# Patient Record
Sex: FEMALE | Race: WHITE | NOT HISPANIC OR LATINO | Employment: UNEMPLOYED | URBAN - METROPOLITAN AREA
[De-identification: names, ages, dates, MRNs, and addresses within clinical notes are randomized per-mention and may not be internally consistent; named-entity substitution may affect disease eponyms.]

---

## 2017-01-05 ENCOUNTER — OFFICE VISIT (OUTPATIENT)
Dept: PHYSICAL MEDICINE AND REHAB | Facility: CLINIC | Age: 22
End: 2017-01-05

## 2017-01-05 VITALS
HEART RATE: 77 BPM | SYSTOLIC BLOOD PRESSURE: 112 MMHG | WEIGHT: 151 LBS | BODY MASS INDEX: 26.75 KG/M2 | DIASTOLIC BLOOD PRESSURE: 64 MMHG | HEIGHT: 63 IN

## 2017-01-05 DIAGNOSIS — G24.3 SPASMODIC TORTICOLLIS: Primary | ICD-10-CM

## 2017-01-05 DIAGNOSIS — G24.1 DYSTONIA, TORSION, FRAGMENTS OF: ICD-10-CM

## 2017-01-05 ASSESSMENT — PAIN SCALES - GENERAL: PAINLEVEL: SEVERE PAIN (6)

## 2017-01-05 NOTE — PROGRESS NOTES
"BOTULINUM TOXIN PROCEDURE NOTE    Chief Complaint   Patient presents with     Dystonia     RBO       /64 mmHg  Pulse 77  Ht 1.6 m (5' 3\")  Wt 68.493 kg (151 lb)  BMI 26.76 kg/m2      Current outpatient prescriptions:      MethylPREDNISolone (MEDROL PO), Take 4 mg by mouth, Disp: , Rfl:      norgestimate-ethinyl estradiol (ORTHO-CYCLEN, SPRINTEC) 0.25-35 MG-MCG per tablet, Take 1 tablet by mouth daily, Disp: 84 tablet, Rfl: 3     sulfamethoxazole-trimethoprim (BACTRIM DS/SEPTRA DS) 800-160 MG per tablet, Take 1 tablet by mouth 2 times daily, Disp: 20 tablet, Rfl: 0     SUMAtriptan (IMITREX) 100 MG tablet, Take 1 tablet (100 mg) by mouth at onset of headache for migraine May repeat in 2 hours. Max 2 tablets/24 hours., Disp: 18 tablet, Rfl: 3     promethazine (PHENERGAN) 25 MG tablet, Take 0.5-1 tablets (12.5-25 mg) by mouth every 6 hours as needed for nausea, Disp: 20 tablet, Rfl: 1     sucralfate (CARAFATE) 1 GM/10ML suspension, Take 10 mLs (1 g) by mouth 4 times daily, Disp: 1200 mL, Rfl: 2     metoclopramide (REGLAN) 5 MG tablet, Take 1 tablet (5 mg) by mouth 2 times daily as needed, Disp: 60 tablet, Rfl: 2     meclizine (ANTIVERT) 25 MG tablet, Take 1 tablet (25 mg) by mouth every 6 hours as needed for dizziness, Disp: 30 tablet, Rfl: 1     LORazepam (ATIVAN) 1 MG tablet, Take 1-2 tablets (1-2 mg) by mouth every 8 hours as needed for other (abdominal pain) Do not operate a vehicle after taking this medication, Disp: 75 tablet, Rfl: 0     apremilast (OTEZLA) 30 MG tablet, Take 1 tablet (30 mg) by mouth 2 times daily, Disp: 60 tablet, Rfl: 3     Apremilast (OTEZLA) 10 & 20 & 30 MG TBPK, Take one tablet in the morning on day 1, then take one tablet every 12 hours on days 2 thru 14, according to the instructions on the packet., Disp: 27 tablet, Rfl: 0     Colchicine 0.6 MG CAPS, Take 0.6 mg by mouth See Admin Instructions 2 capsules in AM and 1 capsule in PM, Disp: 90 capsule, Rfl: 3     Magic Mouthwash (FV " std formula) lidocaine visc 2% 2.5mL/5mL & maalox/mylanta w/ simeth 2.5mL/5mL & diphenhydrAMINE 5mg/5mL, Swish and swallow 10 mLs in mouth every 6 hours as needed for mouth sores, Disp: 1 Bottle, Rfl: 1     clobetasol (TEMOVATE) 0.05 % cream, Apply topically 2 times daily, Disp: 60 g, Rfl: 0     OnabotulinumtoxinA (BOTOX IJ), Inject 150 Units into the muscle once Lot: /C3 Exp: 05/2019, Disp: , Rfl:      botulinum toxin type A (BOTOX) 100 UNITS injection, Inject 200 Units into the muscle every 3 months, Disp: 200 Units, Rfl: 3     ondansetron (ZOFRAN-ODT) 4 MG disintegrating tablet, Take 1 tablet (4 mg) by mouth 3 times daily as needed for nausea, Disp: 120 tablet, Rfl: 3     lidocaine (LIDODERM) 5 % patch, Apply up to 3 patches to painful area at once for up to 12 h within a 24 h period.  Remove after 12 hours., Disp: 30 patch, Rfl: 1     guanFACINE (TENEX) 1 MG tablet, 3 tablets in the morning and 3 tablets in the evening, Disp: 180 tablet, Rfl: 3     omeprazole (PRILOSEC) 40 MG capsule, Take 1 capsule (40 mg) by mouth daily, Disp: 30 capsule, Rfl: 9     linaclotide (LINZESS) 290 MCG capsule, Take 1 capsule (290 mcg) by mouth every morning (before breakfast), Disp: 30 capsule, Rfl: 1     UNABLE TO FIND, daily MEDICATION NAME: Peppermint supplement, Disp: , Rfl:      hyoscyamine (ANASPAZ,LEVSIN) 0.125 MG tablet, Take 1-2 tablets (125-250 mcg) by mouth every 4 hours as needed for cramping, Disp: 40 tablet, Rfl: 1     Aspirin-Acetaminophen-Caffeine (EXCEDRIN MIGRAINE PO), Take by mouth as needed , Disp: , Rfl:      hypromellose (GENTEAL) 0.3 % SOLN, 1 drop every hour as needed, Disp: , Rfl:      betamethasone valerate (VALISONE) 0.1 % cream, Apply topically 2 times daily, Disp: , Rfl:      carbamide peroxide (DEBROX) 6.5 % otic solution, 5 drops 2 times daily, Disp: , Rfl:      psyllium (METAMUCIL SMOOTH TEXTURE) 63 % POWD, Take 3 teaspoonful by mouth 3 times daily Mix in 8 ounces of water, Disp: 1 Bottle, Rfl:  11     polyethylene glycol (MIRALAX/GLYCOLAX) powder, Take 17 g by mouth 2 times daily, Disp: , Rfl:      Lactase (LACTAID PO), Take by mouth daily, Disp: , Rfl:      multivitamin, therapeutic with minerals (MULTI-VITAMIN) TABS, Take 1 tablet by mouth daily, Disp: , Rfl:      Pyridoxine HCl (VITAMIN B6 PO), , Disp: , Rfl:      dicyclomine (BENTYL) 20 MG tablet, Take 1 tablet (20 mg) by mouth every 6 hours, Disp: 90 tablet, Rfl: 1     lidocaine (XYLOCAINE) 2 % jelly, Apply 1 Tube topically daily, Disp: , Rfl:      triamcinolone (KENALOG) 0.1 % cream, Apply sparingly to oral ulcers three times daily for 14 days as needed., Disp: 15 g, Rfl: 1     EPINEPHrine (EPIPEN) 0.3 MG/0.3ML injection, Inject 0.3 mLs into the muscle once as needed for anaphylaxis for 1 dose. And call 911., Disp: 2 each, Rfl: 1     Ranitidine HCl (RANITIDINE 75 PO), Take  by mouth. As needed for GI upset, Disp: , Rfl:      Allergies   Allergen Reactions     Amoxil [Penicillins] Rash     Dad unsure of reaction.     Contrast Dye Rash     Contrast Media Ready-Box [place], 04/09/2014.; Contrast Media Ready-Box [place], 04/09/2014.  NOTE: this is a contrast media oral with iodine. Premedicate with methylpred standard for IV contrast, request barium contrast for oral contrast.     Kiwi Swelling     Orange Fruit [Citrus] Anaphylaxis     Pineapple Anaphylaxis, Difficulty breathing and Rash     Milk Protein Extract Hives     Reglan [Metoclopramide] Other (See Comments)     IV dose only, in ER, rapid heart rate.     Ace Inhibitors      Difficulty in breathing and GI upset     Amoxicillin-Pot Clavulanate      Latex      Midazolam Unknown     parent states that when pt takes this medication, she wakes up being very violent .     Versed      Coming out of pelvic exam at age of 5, was kicking and screaming when coming out of the versed.     Adhesive Tape Rash     Azithromycin Hives and Rash     Cephalexin Itching and Rash     Itchy mouth     Keflex [Cephalexin-Fd&C  "Yellow #6] Hives        PHYSICAL EXAM:    Shoulders rolled forward.    Excessive tone at left shoulder.  C/O excessive pain at left neck and shoulder today.  She  Complains of more migraine headaches over the last couple of weeks.  Also has lower grade headaches throughout the day.  She often applies pressure to trigger points in her shoulders to try to alleviate her headaches.    States she did have improvement with muscle pain, tics and jerking movements after initial dose of Botox.      We reviewed the recommended safety guidelines for  Botox from any vaccine injection, such as the seasonal flu vaccine, by a minimum of 10-14 days with Samara Oropeza. She acknowledged understanding.    HPI:    Patient reports the following new medical problems since last visit: sinus infection resolved.  Pt reports episodes of \"blacking out\" and has been hospitalized.  States she didn't eat for about a month.  Is being followed by Primary Care, Cardiology and Rheumatology.    We reviewed the recommended safety guidelines for  Botox from any vaccine injection, such as the seasonal flu vaccine, by a minimum of 10-14 days with Samara Oropeza. She acknowledged understanding.    RESPONSE TO PREVIOUS TREATMENT:    Samara Oropeza received 150 units of Botox on 10/10/2016.    Problems following the previous series of neurotoxin injections included:  Muscle weakness:  Rated as 'Moderate' severity.  Duration: few days then resolved.  Headache.  Slight bruising at stick site at left shoulder.    BENEFITS BY PATIENT REPORT:    Pain and dystonia improvement reported as 30% for about 4 weeks.    BOTULINUM NEUROTOXIN INJECTION PROCEDURES:    VERIFICATION OF PATIENT IDENTIFICATION AND PROCEDURE     Initials   Patient Name lmd   Patient  lmd   Procedure Verified by: emmy     Prior to the start of the procedure and with procedural staff participation, I verbally confirmed the patient s identity using two " indicators, relevant allergies, that the procedure was appropriate and matched the consent or emergent situation, and that the correct equipment/implants were available. Immediately prior to starting the procedure I conducted the Time Out with the procedural staff and re-confirmed the patient s name, procedure, and site/side. 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     Her baseline symptoms have been recalcitrant to oral medications and conservative therapy.  She is here today for an injection of Botox.      GOAL OF PROCEDURE:  The goal of this procedure is to decrease pain  associated with dystonic movements.    TOTAL DOSE ADMINISTERED:  Dose Administered:  150 units Botox    Diluent Used:  0.25% Sensorcaine  Total Volume of Diluent Used:  1.50 ml  Lot # /C3 with Expiration Date:  8/2019  NDC #: Botox 100u ([address]-53)    Medication guide was offered to patient and was declined.    CONSENT:  The risks, benefits, and treatment options were discussed with Samara Oropeza and she agreed to proceed.      Written consent was obtained by lmd.     EQUIPMENT USED:  Needle-37mm stimulating/recording  EMG/NCS Machine    SKIN PREPARATION:  Skin preparation was performed using an alcohol wipe.    GUIDANCE DESCRIPTION:  Electro-myographic guidance was necessary throughout the administration of Botox to neck and shoulder muscles to accurately identify all areas of dystonic muscles while avoiding injection of non-dystonic muscles, neighboring nerves and nearby vascular structures.      AREA/MUSCLE INJECTED:  150 units of Botox dil 2:1 NS and 0.25% Sensorcaine  Right splenius - 5 units of Botox at 1 site  Left splenius - 5 units of Botox at 1 site    Right lateral trap - 10 units of Botox at 1 site  Left lateral trap - 10 units of Botox at 1 site    Right levator - 10 units of Botox at 1 site  Left levator - 10 units of Botox at 1 site    Right and left frontalis 30 units of Botox at 6 sites    Right and left temporalis - 70 units at 10 sites    RESPONSE TO PROCEDURE:  Samara Oropeza tolerated the procedure well and there were no immediate complications.  She was allowed to recover for an appropriate period of time and was discharged home in stable condition.    FOLLOW UP:  Samara Oropeza was asked to follow up by phone in 7-14 days with Marcelle Richardson PT, Care Coordinator or Vidya Dickinson RN, Care Coordinator, to report her response to this series of injections.  Based on the patient's previous response to this therapy, Samara Oropeza was rescheduled for the next series of injections in 12 weeks.    PLAN (Medication Changes, Therapy Orders, Work or Disability Issues, etc.): Will monitor response to today's injections and report.    There were 50 units of Botox as unavoidable waste for this patient.

## 2017-01-05 NOTE — Clinical Note
"1/5/2017       RE: Samara Oropeza  [address]  [place] [address]     Dear Colleague,    Thank you for referring your patient, Samara Oropeza, to the [facility] PHYSICAL MEDICINE AND REHABILITATION at [facility]. Please see a copy of my visit note below.    BOTULINUM TOXIN PROCEDURE NOTE    Chief Complaint   Patient presents with     Dystonia     RBO       /64 mmHg  Pulse 77  Ht 1.6 m (5' 3\")  Wt 68.493 kg (151 lb)  BMI 26.76 kg/m2      Current outpatient prescriptions:      MethylPREDNISolone (MEDROL PO), Take 4 mg by mouth, Disp: , Rfl:      norgestimate-ethinyl estradiol (ORTHO-CYCLEN, SPRINTEC) 0.25-35 MG-MCG per tablet, Take 1 tablet by mouth daily, Disp: 84 tablet, Rfl: 3     sulfamethoxazole-trimethoprim (BACTRIM DS/SEPTRA DS) 800-160 MG per tablet, Take 1 tablet by mouth 2 times daily, Disp: 20 tablet, Rfl: 0     SUMAtriptan (IMITREX) 100 MG tablet, Take 1 tablet (100 mg) by mouth at onset of headache for migraine May repeat in 2 hours. Max 2 tablets/24 hours., Disp: 18 tablet, Rfl: 3     promethazine (PHENERGAN) 25 MG tablet, Take 0.5-1 tablets (12.5-25 mg) by mouth every 6 hours as needed for nausea, Disp: 20 tablet, Rfl: 1     sucralfate (CARAFATE) 1 GM/10ML suspension, Take 10 mLs (1 g) by mouth 4 times daily, Disp: 1200 mL, Rfl: 2     metoclopramide (REGLAN) 5 MG tablet, Take 1 tablet (5 mg) by mouth 2 times daily as needed, Disp: 60 tablet, Rfl: 2     meclizine (ANTIVERT) 25 MG tablet, Take 1 tablet (25 mg) by mouth every 6 hours as needed for dizziness, Disp: 30 tablet, Rfl: 1     LORazepam (ATIVAN) 1 MG tablet, Take 1-2 tablets (1-2 mg) by mouth every 8 hours as needed for other (abdominal pain) Do not operate a vehicle after taking this medication, Disp: 75 tablet, Rfl: 0     apremilast (OTEZLA) 30 MG tablet, Take 1 tablet (30 mg) by mouth 2 times daily, Disp: 60 tablet, Rfl: 3     Apremilast (OTEZLA) 10 & 20 & 30 MG TBPK, Take one " tablet in the morning on day 1, then take one tablet every 12 hours on days 2 thru 14, according to the instructions on the packet., Disp: 27 tablet, Rfl: 0     Colchicine 0.6 MG CAPS, Take 0.6 mg by mouth See Admin Instructions 2 capsules in AM and 1 capsule in PM, Disp: 90 capsule, Rfl: 3     Magic Mouthwash (FV std formula) lidocaine visc 2% 2.5mL/5mL & maalox/mylanta w/ simeth 2.5mL/5mL & diphenhydrAMINE 5mg/5mL, Swish and swallow 10 mLs in mouth every 6 hours as needed for mouth sores, Disp: 1 Bottle, Rfl: 1     clobetasol (TEMOVATE) 0.05 % cream, Apply topically 2 times daily, Disp: 60 g, Rfl: 0     OnabotulinumtoxinA (BOTOX IJ), Inject 150 Units into the muscle once Lot: /C3 Exp: 05/2019, Disp: , Rfl:      botulinum toxin type A (BOTOX) 100 UNITS injection, Inject 200 Units into the muscle every 3 months, Disp: 200 Units, Rfl: 3     ondansetron (ZOFRAN-ODT) 4 MG disintegrating tablet, Take 1 tablet (4 mg) by mouth 3 times daily as needed for nausea, Disp: 120 tablet, Rfl: 3     lidocaine (LIDODERM) 5 % patch, Apply up to 3 patches to painful area at once for up to 12 h within a 24 h period.  Remove after 12 hours., Disp: 30 patch, Rfl: 1     guanFACINE (TENEX) 1 MG tablet, 3 tablets in the morning and 3 tablets in the evening, Disp: 180 tablet, Rfl: 3     omeprazole (PRILOSEC) 40 MG capsule, Take 1 capsule (40 mg) by mouth daily, Disp: 30 capsule, Rfl: 9     linaclotide (LINZESS) 290 MCG capsule, Take 1 capsule (290 mcg) by mouth every morning (before breakfast), Disp: 30 capsule, Rfl: 1     UNABLE TO FIND, daily MEDICATION NAME: Peppermint supplement, Disp: , Rfl:      hyoscyamine (ANASPAZ,LEVSIN) 0.125 MG tablet, Take 1-2 tablets (125-250 mcg) by mouth every 4 hours as needed for cramping, Disp: 40 tablet, Rfl: 1     Aspirin-Acetaminophen-Caffeine (EXCEDRIN MIGRAINE PO), Take by mouth as needed , Disp: , Rfl:      hypromellose (GENTEAL) 0.3 % SOLN, 1 drop every hour as needed, Disp: , Rfl:       betamethasone valerate (VALISONE) 0.1 % cream, Apply topically 2 times daily, Disp: , Rfl:      carbamide peroxide (DEBROX) 6.5 % otic solution, 5 drops 2 times daily, Disp: , Rfl:      psyllium (METAMUCIL SMOOTH TEXTURE) 63 % POWD, Take 3 teaspoonful by mouth 3 times daily Mix in 8 ounces of water, Disp: 1 Bottle, Rfl: 11     polyethylene glycol (MIRALAX/GLYCOLAX) powder, Take 17 g by mouth 2 times daily, Disp: , Rfl:      Lactase (LACTAID PO), Take by mouth daily, Disp: , Rfl:      multivitamin, therapeutic with minerals (MULTI-VITAMIN) TABS, Take 1 tablet by mouth daily, Disp: , Rfl:      Pyridoxine HCl (VITAMIN B6 PO), , Disp: , Rfl:      dicyclomine (BENTYL) 20 MG tablet, Take 1 tablet (20 mg) by mouth every 6 hours, Disp: 90 tablet, Rfl: 1     lidocaine (XYLOCAINE) 2 % jelly, Apply 1 Tube topically daily, Disp: , Rfl:      triamcinolone (KENALOG) 0.1 % cream, Apply sparingly to oral ulcers three times daily for 14 days as needed., Disp: 15 g, Rfl: 1     EPINEPHrine (EPIPEN) 0.3 MG/0.3ML injection, Inject 0.3 mLs into the muscle once as needed for anaphylaxis for 1 dose. And call 911., Disp: 2 each, Rfl: 1     Ranitidine HCl (RANITIDINE 75 PO), Take  by mouth. As needed for GI upset, Disp: , Rfl:      Allergies   Allergen Reactions     Amoxil [Penicillins] Rash     Dad unsure of reaction.     Contrast Dye Rash     Contrast Media Ready-Box [facility], 04/09/2014.; Contrast Media Ready-Box [facility], 04/09/2014.  NOTE: this is a contrast media oral with iodine. Premedicate with methylpred standard for IV contrast, request barium contrast for oral contrast.     Kiwi Swelling     Orange Fruit [Citrus] Anaphylaxis     Pineapple Anaphylaxis, Difficulty breathing and Rash     Milk Protein Extract Hives     Reglan [Metoclopramide] Other (See Comments)     IV dose only, in ER, rapid heart rate.     Ace Inhibitors      Difficulty in breathing and GI upset     Amoxicillin-Pot Clavulanate      Latex      Midazolam Unknown     parent  "states that when pt takes this medication, she wakes up being very violent .     Versed      Coming out of pelvic exam at age of 5, was kicking and screaming when coming out of the versed.     Adhesive Tape Rash     Azithromycin Hives and Rash     Cephalexin Itching and Rash     Itchy mouth     Keflex [Cephalexin-Fd&C Yellow #6] Hives        PHYSICAL EXAM:    Shoulders rolled forward.    Excessive tone at left shoulder.  C/O excessive pain at left neck and shoulder today.  She  Complains of more migraine headaches over the last couple of weeks.  Also has lower grade headaches throughout the day.  She often applies pressure to trigger points in her shoulders to try to alleviate her headaches.    States she did have improvement with muscle pain, tics and jerking movements after initial dose of Botox.      We reviewed the recommended safety guidelines for  Botox from any vaccine injection, such as the seasonal flu vaccine, by a minimum of 10-14 days with Samara Oropeza. She acknowledged understanding.    HPI:    Patient reports the following new medical problems since last visit: sinus infection resolved.  Pt reports episodes of \"blacking out\" and has been hospitalized.  States she didn't eat for about a month.  Is being followed by Primary Care, Cardiology and Rheumatology.    We reviewed the recommended safety guidelines for  Botox from any vaccine injection, such as the seasonal flu vaccine, by a minimum of 10-14 days with Samara Oropeza. She acknowledged understanding.    RESPONSE TO PREVIOUS TREATMENT:    Samara Oropeza received 150 units of Botox on 10/10/2016.    Problems following the previous series of neurotoxin injections included:  Muscle weakness:  Rated as 'Moderate' severity.  Duration: few days then resolved.  Headache.  Slight bruising at stick site at left shoulder.    BENEFITS BY PATIENT REPORT:    Pain and dystonia improvement reported as 30% for about 4 " weeks.    BOTULINUM NEUROTOXIN INJECTION PROCEDURES:    VERIFICATION OF PATIENT IDENTIFICATION AND PROCEDURE     Initials   Patient Name lmd   Patient  lmd   Procedure Verified by: lmd     Prior to the start of the procedure and with procedural staff participation, I verbally confirmed the patient s identity using two indicators, relevant allergies, that the procedure was appropriate and matched the consent or emergent situation, and that the correct equipment/implants were available. Immediately prior to starting the procedure I conducted the Time Out with the procedural staff and re-confirmed the patient s name, procedure, and site/side. 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     Her baseline symptoms have been recalcitrant to oral medications and conservative therapy.  She is here today for an injection of Botox.      GOAL OF PROCEDURE:  The goal of this procedure is to decrease pain  associated with dystonic movements.    TOTAL DOSE ADMINISTERED:  Dose Administered:  150 units Botox    Diluent Used:  0.25% Sensorcaine  Total Volume of Diluent Used:  1.50 ml  Lot # /C3 with Expiration Date:  2019  NDC #: Botox 100u ([address]-56)    Medication guide was offered to patient and was declined.    CONSENT:  The risks, benefits, and treatment options were discussed with Samara Oropeza and she agreed to proceed.      Written consent was obtained by lmd.     EQUIPMENT USED:  Needle-37mm stimulating/recording  EMG/NCS Machine    SKIN PREPARATION:  Skin preparation was performed  using an alcohol wipe.    GUIDANCE DESCRIPTION:  Electro-myographic guidance was necessary throughout the administration of Botox to neck and shoulder muscles to accurately identify all areas of dystonic muscles while avoiding injection of non-dystonic muscles, neighboring nerves and nearby vascular structures.     AREA/MUSCLE INJECTED:  150 units of Botox dil 2:1 NS and 0.25% Sensorcaine  Right splenius - 5 units of Botox at 1 site  Left splenius - 5 units of Botox at 1 site    Right lateral trap - 10 units of Botox at 1 site  Left lateral trap - 10 units of Botox at 1 site    Right levator - 10 units of Botox at 1 site  Left levator - 10 units of Botox at 1 site    Right and left frontalis 30 units of Botox at 6 sites    Right and left temporalis - 70 units at 10 sites    RESPONSE TO PROCEDURE:  Samara Oropeza tolerated the procedure well and there were no immediate complications.  She was allowed to recover for an appropriate period of time and was discharged home in stable condition.    FOLLOW UP:  Samara Oropeza was asked to follow up by phone in 7-14 days with Marcelle Richardson PT, Care Coordinator or Vidya Dickinson RN, Care Coordinator, to report her response to this series of injections.  Based on the patient's previous response to this therapy, Samara Oropeza was rescheduled for the next series of injections in 12 weeks.    PLAN (Medication Changes, Therapy Orders, Work or Disability Issues, etc.): Will monitor response to today's injections and report.    There were 50 units of Botox as unavoidable waste for this patient.        Again, thank you for allowing me to participate in the care of your patient.      Sincerely,    Frederick Bender MD

## 2017-01-10 ENCOUNTER — TELEPHONE (OUTPATIENT)
Dept: GASTROENTEROLOGY | Facility: CLINIC | Age: 22
End: 2017-01-10

## 2017-01-11 ENCOUNTER — OFFICE VISIT (OUTPATIENT)
Dept: GASTROENTEROLOGY | Facility: CLINIC | Age: 22
End: 2017-01-11

## 2017-01-11 VITALS
OXYGEN SATURATION: 98 % | DIASTOLIC BLOOD PRESSURE: 69 MMHG | WEIGHT: 153.4 LBS | HEIGHT: 63 IN | HEART RATE: 65 BPM | BODY MASS INDEX: 27.18 KG/M2 | SYSTOLIC BLOOD PRESSURE: 108 MMHG

## 2017-01-11 DIAGNOSIS — G47.00 INSOMNIA, UNSPECIFIED TYPE: ICD-10-CM

## 2017-01-11 DIAGNOSIS — R10.9 ABDOMINAL CRAMPING: ICD-10-CM

## 2017-01-11 DIAGNOSIS — K59.04 CHRONIC IDIOPATHIC CONSTIPATION: ICD-10-CM

## 2017-01-11 DIAGNOSIS — R07.89 ATYPICAL CHEST PAIN: Primary | ICD-10-CM

## 2017-01-11 DIAGNOSIS — R13.19 ESOPHAGEAL DYSPHAGIA: ICD-10-CM

## 2017-01-11 RX ORDER — LUBIPROSTONE 24 UG/1
24 CAPSULE ORAL 2 TIMES DAILY WITH MEALS
Qty: 30 CAPSULE | Refills: 3 | Status: SHIPPED | OUTPATIENT
Start: 2017-01-11 | End: 2017-01-16

## 2017-01-11 RX ORDER — ALBUTEROL SULFATE 90 UG/1
2 AEROSOL, METERED RESPIRATORY (INHALATION) EVERY 6 HOURS PRN
Qty: 1 INHALER | Refills: 1 | Status: SHIPPED | OUTPATIENT
Start: 2017-01-11 | End: 2020-07-27

## 2017-01-11 RX ORDER — DICYCLOMINE HYDROCHLORIDE 10 MG/1
10-20 CAPSULE ORAL 2 TIMES DAILY PRN
Qty: 120 CAPSULE | Refills: 3 | Status: SHIPPED | OUTPATIENT
Start: 2017-01-11 | End: 2017-04-21 | Stop reason: DRUGHIGH

## 2017-01-11 ASSESSMENT — ENCOUNTER SYMPTOMS
INCREASED ENERGY: 1
NAIL CHANGES: 0
SINUS CONGESTION: 0
SLEEP DISTURBANCES DUE TO BREATHING: 1
WEAKNESS: 1
HALLUCINATIONS: 0
SHORTNESS OF BREATH: 1
SKIN CHANGES: 0
CHILLS: 1
ABDOMINAL PAIN: 1
HYPOTENSION: 0
JAUNDICE: 0
SEIZURES: 0
LOSS OF CONSCIOUSNESS: 1
TACHYCARDIA: 1
HEARTBURN: 1
SNORES LOUDLY: 0
TINGLING: 1
MYALGIAS: 1
HOARSE VOICE: 0
COUGH: 0
RECTAL BLEEDING: 0
DIZZINESS: 1
SMELL DISTURBANCE: 0
POOR WOUND HEALING: 0
DECREASED CONCENTRATION: 1
SORE THROAT: 0
LEG SWELLING: 1
HEMOPTYSIS: 0
SPEECH CHANGE: 0
SINUS PAIN: 1
EYE REDNESS: 1
CONSTIPATION: 1
DECREASED APPETITE: 1
SYNCOPE: 1
POLYDIPSIA: 0
EYE WATERING: 1
BACK PAIN: 1
PARALYSIS: 0
WEIGHT GAIN: 0
ALTERED TEMPERATURE REGULATION: 1
WHEEZING: 0
VOMITING: 0
POLYPHAGIA: 0
LIGHT-HEADEDNESS: 1
WEIGHT LOSS: 1
CLAUDICATION: 1
BLOOD IN STOOL: 0
POSTURAL DYSPNEA: 1
BLOATING: 1
NECK MASS: 0
HYPERTENSION: 1
DIARRHEA: 1
BRUISES/BLEEDS EASILY: 1
LEG PAIN: 1
EYE PAIN: 1
JOINT SWELLING: 1
NECK PAIN: 1
FEVER: 0
ORTHOPNEA: 1
DISTURBANCES IN COORDINATION: 1
BOWEL INCONTINENCE: 0
COUGH DISTURBING SLEEP: 0
DOUBLE VISION: 0
EYE IRRITATION: 1
NERVOUS/ANXIOUS: 1
DEPRESSION: 0
MUSCLE WEAKNESS: 1
TREMORS: 0
NAUSEA: 1
RESPIRATORY PAIN: 1
ARTHRALGIAS: 1
SPUTUM PRODUCTION: 0
FATIGUE: 1
MEMORY LOSS: 1
MUSCLE CRAMPS: 1
TROUBLE SWALLOWING: 1
PALPITATIONS: 1
TASTE DISTURBANCE: 0
HEADACHES: 1
PANIC: 0
SWOLLEN GLANDS: 0
NUMBNESS: 1
EXERCISE INTOLERANCE: 1
STIFFNESS: 1
DYSPNEA ON EXERTION: 1
NIGHT SWEATS: 1
INSOMNIA: 1
RECTAL PAIN: 0

## 2017-01-11 NOTE — Clinical Note
1/11/2017       RE: Samara Oropeza  [address]  [place] [address]     Dear Colleague,    Thank you for referring your patient, Samara Oropeza, to the GASTROENTEROLOGY AND IBD at [facility]. Please see a copy of my visit note below.    CHIEF COMPLAINT:  Diarrhea and cramping abdominal pain.      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 Marquez who has been seen by [facility], Dr. Burks, Dr. Baker and recently again by myself 12/06/2016 for diarrhea, chest pain, gastroparesis.  A trial of very low dose metoclopramide twice daily was given.  She was on Bactrim at the time of sinusitis which might have treated possible small intestinal bacterial overgrowth.  She was given a trial of sucralfate.  The metoclopramide, if used was then to be stopped except for acute benefit, as there was potential to worsen her Tourette's with it.        Samara returns now with her father and with a note form her mother.  Her mother's concerns are that she is losing weight, having a lot of bloating after even small amounts of food with hardening of the abdomen, still having dizzy spells and blackouts, wondering what this is related to low blood sugar.  Mother reports Samara still has frequent chest pain causing her not to be able to breathe.  Albuterol inhaler helped some.  Samara is only able to eat 1 meal a day, has diarrhea, severe stomach pain and mother reports that Samara had no benefit from Medrol dose.      Samara reports having taken only 1 single dose of the Medrol, as she does not like taking steroids.  The Bactrim she received for sinusitis did decrease her diarrhea and GI symptoms.  She is now off MiraLax and using Linzess daily.  She is having a bowel movement about every other day, and I asked whether that is enough -- remains unanswered.      Even after Bactrim, Samara still  has some diarrhea, now 1-2 times a day and watery and urgent.  Chest pain may occur 8-10 times in a day generally within two 1-hour spells with each pain episode lasting perhaps 5 minutes.  When she drinks cold water, it feels better in the chest.  Her nausea is slightly decreased with the use of Carafate which she has liquid 4 times daily.  She is not having burping or belching.  She believes she had increase in headache and insomnia with Amitiza.  She did have increase in blackouts with Topamax and was no worse off of it.  The blackouts were occurring especially September and October, and Rheumatology thinks it is part of her rheumatologic disease.  The Medrol was given after a fall injury for pain of the fall -- which she took 1 dose of.  She comments on poor sleep regardless.      [place] confirms her mother's report of the chest pain and nausea.  She confirms that she will have bloat with a hard abdomen after ingestion of almost anything and has not seen anything that she is able to eat.  Previously, she had lorazepam with slight benefit for the chest pain, and it continues to help some for her abdomen.  She received albuterol from her boyfriend and that helps with her breathing tightness.      MEDICAL HISTORY, MEDICATIONS, ADVERSE DRUG REACTIONS:  Reviewed.      REVIEW OF SYSTEMS:  Primarily pertinent as above.  Questionnaire reviewed.  She has sinus pain, difficulty swallowing.  No sore throat but mouth sores.  Gum pain and bleeding gums, dry mouth.  The chest pain occurs both on exertion and at rest and awakens her in the night.  She awakens in the night also, with shortness of breath.  She has heartburn, nausea, not specifically with her chest pain, she thinks.  She has abdominal pain, bloat, constipation, diarrhea.  Other ailments are noted in her questionnaire with multiple positive systems.      OBJECTIVE:   VITAL SIGNS:  Reviewed.  Weight 153/69.58 kg.  Height 63/1.6 meters.  BMI 27.  The recorded  weights November and 12/06 158 pounds with a BMI 28.   GENERAL:  Clean, pleasant, well-nourished woman who looks tired.  She is here with her father, Kishor.   EYES:  Clear.   NEUROLOGIC:  Speech is fluent and logical.   RESPIRATORY:  Breathing is calm and grossly normal.   ABDOMEN:  Mildly tender, left greater than right, and also epigastrium.  Abdominal tenderness is decreased with abdominal muscle tension.      PAST RESULTS:  Include upper GI endoscopy 06/2014 and also 2005 done for dysphagia, abdominal pain without successful treatment.  On both times with visually normal and with normal biopsies.  In 06/2014, there was also colonoscopy with normal biopsies.      Note also Samara is not sure she received trial of the metoclopramide.  She has not experienced worsening of her torticollis recently but has continued to undergo visits and treatments including botulism toxin for excessive tightness at the left shoulder, most recently, 01/05/2017.        ASSESSMENT:  A woman with considerable gastrointestinal distress, some of which clearly falls within the criteria for irritable bowel syndrome, with significant cramping bloating.  Without an agent for constipation, however, she will become constipated. Her chest pain is not clarified regarding whether it is related to acid reflux or not.  Whether she has genuine benefit or some placebo benefit with mild help from her boyfriend's albuterol is not clear.  It is less likely to be secondary to esophageal spasm if she feels better with cold liquid as cold liquid more often triggers it.      PLAN:   1.  Discontinue any metoclopramide.   2.  Restart amitriptyline now 25 mg at bedtime, may increase to 2 if no benefit 2 weeks.  Note that she was on a much higher dose previously.   3.  Trial of dicyclomine 10 or 20 mg 2 times daily as needed.  Call if she always needs a higher dose. I described benefits and side effects.   4.  Temporary prescription for her to receive  albuterol.   5.  Refer to Pulmonary Medicine for atypical chest pain.   6.  Two years ago ordered upper GI endoscopy with monitored anesthesia care, not of current concern as she has no immediate symptoms with swallowing and scopes have been negative for similar or worse symptoms before.  Also, previously ordered have been esophageal motility and pH studies; these remain recommended.  She did not believe she would be able to tolerate these days but understands the benefit she may have from the results and that she may be able to focus and accomplished doing the studies.   7.  An alternate treatment for constipation, trial of lubiprostone.  The linaclotide was better than previous management, but she may do better with the lubiprostone and will decide between them.     8.  The use and adverse effects of the Reglan were discussed, and she may discuss that further with her mother.  She did have adverse effect when she received it in ER at one point.   9.  Practice diaphragmatic breathing and use it regularly.   10.  Regarding her nightmares, try to shape them.   11.  Psyllium may help from stool and give additional benefit over time.   12.  Probiotics were discussed but are not strongly recommended.   13.  Allergy syndrome interactions were discussed and presented that she may review these at home and consider whether she has additional food reactions.   14.  Practice Kegel exercises and relax those muscles for defecation..   15.  At this time, leave the February appointment on books and reschedule if no results will be available at that time.      We discussed the assessment and plan.     Total visit 35 minutes with counseling time 20 minutes.    EVI DE LA FUENTE CNP       D: 2017 13:03   T: 2017 09:14   MT: LEIA      Name:     LUCINA BAH   MRN:      [id number]        Account:      [id number]   :      1995           Service Date: 2017      Document: [id number]        Answers  for HPI/ROS submitted by the patient on 1/11/2017   General Symptoms: Yes  Skin Symptoms: Yes  HENT Symptoms: Yes  EYE SYMPTOMS: Yes  HEART SYMPTOMS: Yes  LUNG SYMPTOMS: Yes  INTESTINAL SYMPTOMS: Yes  URINARY SYMPTOMS: No  GYNECOLOGIC SYMPTOMS: No  BREAST SYMPTOMS: No  SKELETAL SYMPTOMS: Yes  BLOOD SYMPTOMS: Yes  NERVOUS SYSTEM SYMPTOMS: Yes  MENTAL HEALTH SYMPTOMS: Yes  Fever: No  Loss of appetite: Yes  Weight loss: Yes  Weight gain: No  Fatigue: Yes  Night sweats: Yes  Chills: Yes  Increased stress: No  Excessive hunger: No  Excessive thirst: No  Feeling hot or cold when others believe the temperature is normal: Yes  Loss of height: No  Post-operative complications: No  Surgical site pain: No  Hallucinations: No  Change in or Loss of Energy: Yes  Hyperactivity: No  Confusion: Yes  Changes in hair: No  Changes in moles/birth marks: No  Itching: No  Rashes: No  Changes in nails: No  Acne: No  Hair in places you don't want it: No  Change in facial hair: No  Warts: No  Non-healing sores: No  Scarring: Yes  Flaking of skin: No  Color changes of hands/feet in cold : Yes  Sun sensitivity: Yes  Skin thickening: No  Ear pain: Yes  Ear discharge: No  Hearing loss: No  Tinnitus: Yes  Nosebleeds: Yes  Congestion: No  Sinus pain: Yes  Trouble swallowing: Yes   Voice hoarseness: No  Mouth sores: Yes  Sore throat: No  Tooth pain: Yes  Gum tenderness: Yes  Bleeding gums: Yes  Change in taste: No  Change in sense of smell: No  Dry mouth: Yes  Hearing aid used: No  Neck lump: No  Eye pain: Yes  Vision loss: No  Dry eyes: Yes  Watery eyes: Yes  Eye bulging: No  Double vision: No  Flashing of lights: No  Spots: Yes  Floaters: Yes  Redness: Yes  Crossed eyes: No  Tunnel Vision: No  Yellowing of eyes: No  Eye irritation: Yes  Cough: No  Sputum or phlegm: No  Coughing up blood: No  Difficulty breating or shortness of breath: Yes  Snoring: No  Wheezing: No  Difficulty breathing on exertion: Yes  Respiratory pain: Yes  Nighttime Cough:  No  Difficulty breathing when lying flat: Yes  Chest pain or pressure: Yes  Fast or irregular heartbeat: Yes  Pain in legs with walking: Yes  Swelling in feet or ankles: Yes  Trouble breathing while lying down: Yes  Fingers or Toes appear blue: Yes  High blood pressure: Yes  Low blood pressure: No  Fainting: Yes  Murmurs: No  Chest pain on exertion: Yes  Chest pain at rest: Yes  Cramping pain in leg during exercise: Yes  Pacemaker: No  Varicose veins: No  Edema or swelling: No  Fast heart beat: Yes  Wake up at night with shortness of breath: Yes  Heart flutters: No  Light-headedness: Yes  Exercise intolerance: Yes  Heart burn or indigestion: Yes  Nausea: Yes  Vomiting: No  Abdominal pain: Yes  Bloating: Yes  Constipation: Yes  Diarrhea: Yes  Blood in stool: No  Black stools: No  Rectal or Anal pain: No  Fecal incontinence: No  Rectal bleeding: No  Yellowing of skin or eyes: No  Vomit with blood: No  Change in stools: No  Hemorrhoids: No  Back pain: Yes  Muscle aches: Yes  Neck pain: Yes  Swollen joints: Yes  Joint pain: Yes  Bone pain: Yes  Muscle cramps: Yes  Muscle weakness: Yes  Joint stiffness: Yes  Bone fracture: No  Anemia: No  Swollen glands: No  Easy bleeding or bruising: Yes  Trouble with coordination: Yes  Dizziness or trouble with balance: Yes  Fainting or black-out spells: Yes  Memory loss: Yes  Headache: Yes  Seizures: No  Speech problems: No  Tingling: Yes  Tremor: No  Weakness: Yes  Difficulty walking: Yes  Paralysis: No  Numbness: Yes  Nervous or Anxious: Yes  Depression: No  Trouble sleeping: Yes  Trouble thinking or concentrating: Yes  Mood changes: Yes  Panic attacks: No    Again, thank you for allowing me to participate in the care of your patient.      Sincerely,    EVI Vizcaino CNP

## 2017-01-11 NOTE — PATIENT INSTRUCTIONS
"To, the Bactrim for sinuses helped your gut also. That suggests small intestinal bacterial overgrowth (SIBO). This occurs more when people have slow gut and more with acid reducing medication.    The Linzess is better than previous management.  Linaclotide (Linzess) is taken 30 to 60 min before a person eats to prevent diarrhea. The is no statistical difference for side effect(s) or BM at the two doses. The lower dose (145) is less benefit for abdominal pain over time.    Another Trial of medication against constipation:  When approved, start lubiprostone (Amitiza) 1 at breakfast daily for 5 days.  If ok, then add one at supper every day for long term dose of two a day, with food.      We talked about a trial of metoclopramide (Reglan) at a lower dose and only by mouth (your side effect(s) was when by IV)  This may decrease headache   It may make torticollis worse.  If you decide to try this, be aware and stop for any reason.    Diaphragmatic breathing uses the large muscle between the chest and abdomen.        (rather than only the small muscles between the ribs)  This breathing calms the gut, decreases pain, prevents pain from being overwhelming.  This breathing is documented to calm the brain centers that create anxiety.  Learn this with one hand on the belly button and one on the chest.  Prop your head so that you can relax and see your hands.  Blow out tightening the tummy, blow with a gentle steady breath.  When you inhale, be sure that your tummy is expanding, not the chest.  Gradually slow this down. Breathe in and out evenly, think \"1-2, 1-2-\" then \"1-2-3-, 1-2-3-\"    To fall asleep, start the same way.  Relax all muscles, especially the neck.   Send thoughts to the universe to let go of something you cannot control,         or to the bed-side table to remember for tomorrow.  Then, let the diaphragmatic breath fall out. Take the next breath only when the body needs it.      This breathing technique calms " "the primitive brain, where anxiety otherwise may escalate.  This breathing technique allows you to think more clearly when anxiety is creeping up again.  If you need to sleep, do NOT count or count sheep. Think about how your breath goes in the nose and out the mouth.  When thoughts come, think: do I need to do that tomorrow or next week? Yes? - put it (in your imagination) on the bedside table.  No? No need to do something OR it is out of your control, then that thought or worry must be given to the universe. The universe can handle it. Put that thought in a bubble, like bubbles children blow, and blow it away and watch it burst. The universe will take care of it.  Then get back to your breathing. Feel your breathing. Do this again and again. Come back to your breathing.    This takes practice. But it is a step one medication for anxiety, a step one medication for sleep. It will help the other medications all work better.     Re nightmares - try to shape them, what happens  Or say, the this part of me walked through the door part of me and xxx..    The psyllium may always help form stool, more so if used two times daily.  It also supports the helpful gut bacteria.    Probiotics help a few people.  They are safe for most people, if not on chemotherapy or immunosuppressed.  Known good products include:  Florajen 3, [employer]pex[employer]ak Colon Health, VSL 3, Florastor, Align.  Chose one with a B or b word in the ingredients.  Others with multiple ingredients and not just a Lactobacillus strain may be good.    In general, the healthiest probiotic product is Kefir.  It is a dairy product which is also available lactose-free.  It is like a cross between yogurt and buttermilk, available in the dairy section of a grocery story, with or without fruit mixed in.  Also available soy based, and available as granules on line which you may use to may it in sugar water.      \"Many people with seasonal allergies are also affected by oral " "allergy reactions, such as itchy or prickling feeling in the mouth when eating certain foods.\"    Persons with BIRCH allergy may have oral allergy symptoms with kiwi, apples, pears, peaches, cherries, carrots, hazelnuts, and almonds.  Persons with GRASS allergy may react to peaches, celery, tomatoes, melons, and oranges.  Persons with LATEX allergy may react to banana, avocado, kiwi, and papaya.  Persons with RAGWEED allergy may react to bananas, honeydew, cantaloupe, watermelon, and tomatoes.    Most food allergies are not testable with traditional allergy testing. But foods that cause mouth prickling or itching, hives, or rash can often be diagnosed with testing by an allergist.    Dicyclomine 1 or two is sent for two times daily . Up to four times daily is ok, but then dry mouth and constipation are common.    Restart amitriptyline now at one pill, 25 mg, increase to 2 50 mg after two weeks if you wish.     Learn and do Kegel exercises:    slowly tighten muscles inside the bottom as if to hold urine or gas,    then relax those muscles.  Do this exercise a few times, a few times each day.  AND when you sit on the toilet,   do a Kegel and then relax those muscles to have a BM.    For now, leave Feb appointment. Reschedule if no results will be available at that time.            ++++    For questions regarding your care Monday through Friday  Or, you may send a My Chart message.  For medication refills (prescribed by the GI clinic), contact your pharmacy.  Please allow 36 - 72 hours.  After hours, or if you have an immediate GI concern and cannot wait for a return call, contact the GI Fellow at [phone] and select option #4.      EVI Silva, CNP     Gastroenterology  For appointment rescheduling/cancellation, contact [facility] at [phone]  Evette HARE  Care Coordinator    [phone]        "

## 2017-01-11 NOTE — MR AVS SNAPSHOT
After Visit Summary   1/11/2017    Samara Oropeza    MRN: [id number]           Patient Information     Date Of Birth          1995        Visit Information        Provider Department      1/11/2017 2:00 PM Kacie Almeida APRN CNP Gastroenterology and IBD        Today's Diagnoses     Atypical chest pain    -  1     Esophageal dysphagia         Abdominal cramping         Insomnia, unspecified type         Chronic idiopathic constipation           Care Instructions    To, the Bactrim for sinuses helped your gut also. That suggests small intestinal bacterial overgrowth (SIBO). This occurs more when people have slow gut and more with acid reducing medication.    The Linzess is better than previous management.  Linaclotide (Linzess) is taken 30 to 60 min before a person eats to prevent diarrhea. The is no statistical difference for side effect(s) or BM at the two doses. The lower dose (145) is less benefit for abdominal pain over time.    Another Trial of medication against constipation:  When approved, start lubiprostone (Amitiza) 1 at breakfast daily for 5 days.  If ok, then add one at supper every day for long term dose of two a day, with food.      We talked about a trial of metoclopramide (Reglan) at a lower dose and only by mouth (your side effect(s) was when by IV)  This may decrease headache   It may make torticollis worse.  If you decide to try this, be aware and stop for any reason.    Diaphragmatic breathing uses the large muscle between the chest and abdomen.        (rather than only the small muscles between the ribs)  This breathing calms the gut, decreases pain, prevents pain from being overwhelming.  This breathing is documented to calm the brain centers that create anxiety.  Learn this with one hand on the belly button and one on the chest.  Prop your head so that you can relax and see your hands.  Blow out tightening the tummy, blow with a gentle steady breath.  When you  "inhale, be sure that your tummy is expanding, not the chest.  Gradually slow this down. Breathe in and out evenly, think \"1-2, 1-2-\" then \"1-2-3-, 1-2-3-\"    To fall asleep, start the same way.  Relax all muscles, especially the neck.   Send thoughts to the universe to let go of something you cannot control,         or to the bed-side table to remember for tomorrow.  Then, let the diaphragmatic breath fall out. Take the next breath only when the body needs it.      This breathing technique calms the primitive brain, where anxiety otherwise may escalate.  This breathing technique allows you to think more clearly when anxiety is creeping up again.  If you need to sleep, do NOT count or count sheep. Think about how your breath goes in the nose and out the mouth.  When thoughts come, think: do I need to do that tomorrow or next week? Yes? - put it (in your imagination) on the bedside table.  No? No need to do something OR it is out of your control, then that thought or worry must be given to the universe. The universe can handle it. Put that thought in a bubble, like bubbles children blow, and blow it away and watch it burst. The universe will take care of it.  Then get back to your breathing. Feel your breathing. Do this again and again. Come back to your breathing.    This takes practice. But it is a step one medication for anxiety, a step one medication for sleep. It will help the other medications all work better.     Re nightmares - try to shape them, what happens  Or say, the this part of me walked through the door part of me and xxx..    The psyllium may always help form stool, more so if used two times daily.  It also supports the helpful gut bacteria.    Probiotics help a few people.  They are safe for most people, if not on chemotherapy or immunosuppressed.  Known good products include:  Florajen 3, Bix, Digital [employer] DialogL 3, Florastor, Align.  Chose one with a B or b word in the ingredients.  Others with " "multiple ingredients and not just a Lactobacillus strain may be good.    In general, the healthiest probiotic product is Kefir.  It is a dairy product which is also available lactose-free.  It is like a cross between yogurt and buttermilk, available in the dairy section of a grocery story, with or without fruit mixed in.  Also available soy based, and available as granules on line which you may use to may it in sugar water.      \"Many people with seasonal allergies are also affected by oral allergy reactions, such as itchy or prickling feeling in the mouth when eating certain foods.\"    Persons with BIRCH allergy may have oral allergy symptoms with kiwi, apples, pears, peaches, cherries, carrots, hazelnuts, and almonds.  Persons with GRASS allergy may react to peaches, celery, tomatoes, melons, and oranges.  Persons with LATEX allergy may react to banana, avocado, kiwi, and papaya.  Persons with RAGWEED allergy may react to bananas, honeydew, cantaloupe, watermelon, and tomatoes.    Most food allergies are not testable with traditional allergy testing. But foods that cause mouth prickling or itching, hives, or rash can often be diagnosed with testing by an allergist.    Dicyclomine 1 or two is sent for two times daily . Up to four times daily is ok, but then dry mouth and constipation are common.    Restart amitriptyline now at one pill, 25 mg, increase to 2 50 mg after two weeks if you wish.     Learn and do Kegel exercises:    slowly tighten muscles inside the bottom as if to hold urine or gas,    then relax those muscles.  Do this exercise a few times, a few times each day.  AND when you sit on the toilet,   do a Kegel and then relax those muscles to have a BM.    For now, leave Feb appointment. Reschedule if no results will be available at that time.            ++++    For questions regarding your care Monday through Friday  Or, you may send a My Chart message.  For medication refills (prescribed by the GI " clinic), contact your pharmacy.  Please allow 36 - 72 hours.  After hours, or if you have an immediate GI concern and cannot wait for a return call, contact the GI Fellow at [phone] and select option #4.      EVI Silva CNP     Gastroenterology  For appointment rescheduling/cancellation, contact [facility] at [phone]  Evette HARE  Care Coordinator    [phone]              Follow-ups after your visit        Additional Services     PULMONARY MEDICINE REFERRAL       Your provider has referred you to: [facility]  Please be aware that coverage of these services is subject to the terms and limitations of your health insurance plan.  Call member services at your health plan with any benefit or coverage questions.      Please bring the following with you to your appointment:    (1) Any X-Rays, CTs or MRIs which have been performed.  Contact the facility where they were done to arrange for  prior to your scheduled appointment.    (2) List of current medications   (3) This referral request   (4) Any documents/labs given to you for this referral                  Your next 10 appointments already scheduled     Jan 13, 2017  1:30 PM   (Arrive by 1:15 PM)   Return Visit with Austen Marquez MD   [facility] Rheumatology ([facility])    [address]  3rd [address]  [facility] [address] [phone]            Jan 17, 2017 10:10 AM   LUIZ Extremity with Ashtyn Hernandez PTA   [facility] Physical Therapy LUIZ ([facility])    [address]  5th [address]  [facility] [address] [phone]            Jan 17, 2017 11:30 AM   Office Visit with EIV Cardona CNP   [facility]    [address] [place] [address] [address]  [facility] [address] [phone]           Bring a current list of meds and any records pertaining to this visit.  For Physicals, please bring immunization records and any forms  needing to be filled out.  Please arrive 10 minutes early to complete paperwork.            Jan 24, 2017 10:10 AM   LUIZ Extremity with Ashtyn Hernandez PTA   [facility] ([facility])    [address] [place] [address]  5th [facility] [id number] [phone]            Jan 31, 2017 10:00 AM   LUIZ Extremity with Jennifer Alvarenga PT   [facility] ([facility])    [address] [place] [id number] [phone]            Feb 17, 2017  1:30 PM   (Arrive by 1:15 PM)   Return Visit with EVI Vizcaino CNP   Gastroenterology and IBD ([facility])    [address] [place] [id number] [phone]            Mar 27, 2017  2:20 PM   (Arrive by 2:05 PM)   Return Botox with Frederick Bender MD   [facility] ([facility])    [address] [place] [id number] [phone]              Who to contact     Please call your clinic at [phone] to:    Ask questions about your health    Make or cancel appointments    Discuss your medicines    Learn about your test results    Speak to your doctor   If you have compliments or concerns about an experience at your clinic, or if you wish to file a complaint, please contact [facility] Patient Relations at [phone] or email us at [email or web address]         Additional Information About Your Visit        [employer] Information     [employer] gives you secure access to your electronic health record. If you see a primary care provider, you can also send messages to your care team and make appointments. If you have questions, please call your primary care clinic.  If you do not have a primary care provider, please call [phone] and they will assist you.      [employer] is an electronic gateway that  "provides easy, online access to your medical records. With [employer], you can request a clinic appointment, read your test results, renew a prescription or communicate with your care team.     To access your existing account, please contact your [facility] or call [phone] for assistance.        Care EveryWhere ID     This is your Care EveryWhere ID. This could be used by other organizations to access your [place] medical records  [id number]        Your Vitals Were     Pulse Height Pulse Oximetry             65 1.6 m (5' 2.99\") 98%          Blood Pressure from Last 3 Encounters:   01/11/17 108/69   01/05/17 112/64   12/06/16 109/71    Weight from Last 3 Encounters:   01/05/17 68.493 kg (151 lb)   12/06/16 71.759 kg (158 lb 3.2 oz)   12/06/16 71.759 kg (158 lb 3.2 oz)              We Performed the Following     PULMONARY MEDICINE REFERRAL          Today's Medication Changes          These changes are accurate as of: 1/11/17  3:18 PM.  If you have any questions, ask your nurse or doctor.               Start taking these medicines.        Dose/Directions    albuterol 108 (90 BASE) MCG/ACT Inhaler   Commonly known as:  PROAIR HFA/PROVENTIL HFA/VENTOLIN HFA   Used for:  Atypical chest pain        Dose:  2 puff   Inhale 2 puffs into the lungs every 6 hours as needed for shortness of breath / dyspnea or wheezing   Quantity:  1 Inhaler   Refills:  1       amitriptyline 25 MG tablet   Commonly known as:  ELAVIL   Used for:  Atypical chest pain, Insomnia, unspecified type        Dose:  25 mg   Take 1 tablet (25 mg) by mouth At Bedtime May increase to 2, if no benefit after 2 weeks.  Feel free to call / page the nurse for Dr. Mcmahon at [phone] / [phone] with questions regarding this order.   Quantity:  45 tablet   Refills:  3       lubiprostone 24 MCG capsule   Commonly known as:  AMITIZA   Used for:  Chronic idiopathic constipation        Dose:  24 mcg   Take 1 capsule (24 mcg) " by mouth 2 times daily (with meals)   Quantity:  30 capsule   Refills:  3         These medicines have changed or have updated prescriptions.        Dose/Directions    * dicyclomine 20 MG tablet   Commonly known as:  BENTYL   This may have changed:  Another medication with the same name was added. Make sure you understand how and when to take each.   Used for:  Abdominal pain, epigastric        Dose:  20 mg   Take 1 tablet (20 mg) by mouth every 6 hours   Quantity:  90 tablet   Refills:  1       * dicyclomine 10 MG capsule   Commonly known as:  BENTYL   This may have changed:  You were already taking a medication with the same name, and this prescription was added. Make sure you understand how and when to take each.   Used for:  Abdominal cramping        Dose:  10-20 mg   Take 1-2 capsules (10-20 mg) by mouth 2 times daily as needed   Quantity:  120 capsule   Refills:  3       * Notice:  This list has 2 medication(s) that are the same as other medications prescribed for you. Read the directions carefully, and ask your doctor or other care provider to review them with you.         Where to get your medicines      These medications were sent to [facility] - [address] [address]     Phone:  [phone]    - albuterol 108 (90 BASE) MCG/ACT Inhaler  - amitriptyline 25 MG tablet  - dicyclomine 10 MG capsule  - lubiprostone 24 MCG capsule             Primary Care Provider Office Phone # Fax #    EVI Cardona [facility] [phone]       [facility] [address] [facility] [address]  [facility] [address]        Thank you!     Thank you for choosing GASTROENTEROLOGY AND IBD  for your care. Our goal is always to provide you with excellent care. Hearing back from our patients is one way we can continue to improve our services. Please take a few minutes to complete the written survey that you may receive in the  mail after your visit with us. Thank you!             Your Updated Medication List - Protect others around you: Learn how to safely use, store and throw away your medicines at www.disposemymeds.org.          This list is accurate as of: 1/11/17  3:18 PM.  Always use your most recent med list.                   Brand Name Dispense Instructions for use    albuterol 108 (90 BASE) MCG/ACT Inhaler    PROAIR HFA/PROVENTIL HFA/VENTOLIN HFA    1 Inhaler    Inhale 2 puffs into the lungs every 6 hours as needed for shortness of breath / dyspnea or wheezing       amitriptyline 25 MG tablet    ELAVIL    45 tablet    Take 1 tablet (25 mg) by mouth At Bedtime May increase to 2, if no benefit after 2 weeks.  Feel free to call / page the nurse for Dr. Mcmahon at [phone] / [phone] with questions regarding this order.       * apremilast 30 MG tablet    OTEZLA    60 tablet    Take 1 tablet (30 mg) by mouth 2 times daily       * Apremilast 10 & 20 & 30 MG Tbpk    OTEZLA    27 tablet    Take one tablet in the morning on day 1, then take one tablet every 12 hours on days 2 thru 14, according to the instructions on the packet.       betamethasone valerate 0.1 % cream    VALISONE     Apply topically 2 times daily       * botulinum toxin type A 100 UNITS injection    BOTOX    200 Units    Inject 200 Units into the muscle every 3 months       * BOTOX IJ      Inject 150 Units into the muscle once Lot: /C3 Exp: 05/2019       * BOTOX IJ      Inject 150 Units into the muscle Lot # /C3  Exp: 8/2019       carbamide peroxide 6.5 % otic solution    DEBROX     5 drops 2 times daily       clobetasol 0.05 % cream    TEMOVATE    60 g    Apply topically 2 times daily       Colchicine 0.6 MG Caps     90 capsule    Take 0.6 mg by mouth See Admin Instructions 2 capsules in AM and 1 capsule in PM       * dicyclomine 20 MG tablet    BENTYL    90 tablet    Take 1 tablet (20 mg) by mouth every 6 hours       * dicyclomine 10 MG capsule     BENTYL    120 capsule    Take 1-2 capsules (10-20 mg) by mouth 2 times daily as needed       EPINEPHrine 0.3 MG/0.3ML injection    EPIPEN    2 each    Inject 0.3 mLs into the muscle once as needed for anaphylaxis for 1 dose. And call 911.       EXCEDRIN MIGRAINE PO      Take by mouth as needed       guanFACINE 1 MG tablet    TENEX    180 tablet    3 tablets in the morning and 3 tablets in the evening       hyoscyamine 0.125 MG tablet    ANASPAZ/LEVSIN    40 tablet    Take 1-2 tablets (125-250 mcg) by mouth every 4 hours as needed for cramping       hypromellose 0.3 % Soln ophthalmic solution    GENTEAL     1 drop every hour as needed       LACTAID PO      Take by mouth daily       lidocaine 2 % topical gel    XYLOCAINE     Apply 1 Tube topically daily       lidocaine 5 % Patch    LIDODERM    30 patch    Apply up to 3 patches to painful area at once for up to 12 h within a 24 h period.  Remove after 12 hours.       lidocaine visc 2% 2.5mL/5mL & maalox/mylanta w/ simeth 2.5mL/5mL & diphenhydrAMINE 5mg/5mL Susp suspension    MAGIC Mouthwash    1 Bottle    Swish and swallow 10 mLs in mouth every 6 hours as needed for mouth sores       linaclotide 290 MCG capsule    LINZESS    30 capsule    Take 1 capsule (290 mcg) by mouth every morning (before breakfast)       LORazepam 1 MG tablet    ATIVAN    75 tablet    Take 1-2 tablets (1-2 mg) by mouth every 8 hours as needed for other (abdominal pain) Do not operate a vehicle after taking this medication       lubiprostone 24 MCG capsule    AMITIZA    30 capsule    Take 1 capsule (24 mcg) by mouth 2 times daily (with meals)       meclizine 25 MG tablet    ANTIVERT    30 tablet    Take 1 tablet (25 mg) by mouth every 6 hours as needed for dizziness       MEDROL PO      Take 4 mg by mouth       metoclopramide 5 MG tablet    REGLAN    60 tablet    Take 1 tablet (5 mg) by mouth 2 times daily as needed       Multi-vitamin Tabs tablet      Take 1 tablet by mouth daily        norgestimate-ethinyl estradiol 0.25-35 MG-MCG per tablet    ORTHO-CYCLEN, SPRINTEC    84 tablet    Take 1 tablet by mouth daily       omeprazole 40 MG capsule    priLOSEC    30 capsule    Take 1 capsule (40 mg) by mouth daily       ondansetron 4 MG ODT tab    ZOFRAN-ODT    120 tablet    Take 1 tablet (4 mg) by mouth 3 times daily as needed for nausea       polyethylene glycol powder    MIRALAX/GLYCOLAX     Take 17 g by mouth 2 times daily       promethazine 25 MG tablet    PHENERGAN    20 tablet    Take 0.5-1 tablets (12.5-25 mg) by mouth every 6 hours as needed for nausea       psyllium 63 % Powd    METAMUCIL SMOOTH TEXTURE    1 Bottle    Take 3 teaspoonful by mouth 3 times daily Mix in 8 ounces of water       RANITIDINE 75 PO      Take  by mouth. As needed for GI upset       sucralfate 1 GM/10ML suspension    CARAFATE    1200 mL    Take 10 mLs (1 g) by mouth 4 times daily       SUMAtriptan 100 MG tablet    IMITREX    18 tablet    Take 1 tablet (100 mg) by mouth at onset of headache for migraine May repeat in 2 hours. Max 2 tablets/24 hours.       triamcinolone 0.1 % cream    KENALOG    15 g    Apply sparingly to oral ulcers three times daily for 14 days as needed.       UNABLE TO FIND      daily MEDICATION NAME: Peppermint supplement       VITAMIN B6 PO          * Notice:  This list has 7 medication(s) that are the same as other medications prescribed for you. Read the directions carefully, and ask your doctor or other care provider to review them with you.

## 2017-01-12 ENCOUNTER — TELEPHONE (OUTPATIENT)
Dept: GASTROENTEROLOGY | Facility: CLINIC | Age: 22
End: 2017-01-12

## 2017-01-12 DIAGNOSIS — K59.04 CHRONIC IDIOPATHIC CONSTIPATION: Primary | ICD-10-CM

## 2017-01-12 NOTE — TELEPHONE ENCOUNTER
Jarrett Albert RN Brusoe, Lisa, CMA       Phone Number: [phone]                     [place] [facility] [place]. Needs indication for order of Amitiza as it is unusual dosing per Shagufta.   Shagufta says press option 2 when you call the number given.   Thanks!

## 2017-01-13 ENCOUNTER — OFFICE VISIT (OUTPATIENT)
Dept: RHEUMATOLOGY | Facility: CLINIC | Age: 22
End: 2017-01-13
Attending: INTERNAL MEDICINE
Payer: COMMERCIAL

## 2017-01-13 VITALS
BODY MASS INDEX: 27.11 KG/M2 | DIASTOLIC BLOOD PRESSURE: 70 MMHG | SYSTOLIC BLOOD PRESSURE: 102 MMHG | WEIGHT: 153 LBS | HEART RATE: 93 BPM | HEIGHT: 63 IN | OXYGEN SATURATION: 99 %

## 2017-01-13 DIAGNOSIS — J30.2 SEASONAL ALLERGIC RHINITIS: Primary | ICD-10-CM

## 2017-01-13 DIAGNOSIS — M35.2 BEHCET'S SYNDROME (H): ICD-10-CM

## 2017-01-13 DIAGNOSIS — M35.2 BEHCET'S SYNDROME (H): Primary | ICD-10-CM

## 2017-01-13 DIAGNOSIS — M06.09 RHEUMATOID ARTHRITIS OF MULTIPLE SITES WITHOUT RHEUMATOID FACTOR (H): ICD-10-CM

## 2017-01-13 LAB
ALT SERPL W P-5'-P-CCNC: 39 U/L (ref 0–50)
AST SERPL W P-5'-P-CCNC: 21 U/L (ref 0–45)
BASOPHILS # BLD AUTO: 0 10E9/L (ref 0–0.2)
BASOPHILS NFR BLD AUTO: 0.4 %
CREAT SERPL-MCNC: 0.77 MG/DL (ref 0.52–1.04)
DIFFERENTIAL METHOD BLD: NORMAL
EOSINOPHIL # BLD AUTO: 0.2 10E9/L (ref 0–0.7)
EOSINOPHIL NFR BLD AUTO: 4.5 %
ERYTHROCYTE [DISTWIDTH] IN BLOOD BY AUTOMATED COUNT: 12.6 % (ref 10–15)
GFR SERPL CREATININE-BSD FRML MDRD: NORMAL ML/MIN/1.7M2
HCT VFR BLD AUTO: 40.9 % (ref 35–47)
HGB BLD-MCNC: 13.9 G/DL (ref 11.7–15.7)
IMM GRANULOCYTES # BLD: 0 10E9/L (ref 0–0.4)
IMM GRANULOCYTES NFR BLD: 0 %
LYMPHOCYTES # BLD AUTO: 2.3 10E9/L (ref 0.8–5.3)
LYMPHOCYTES NFR BLD AUTO: 49 %
MCH RBC QN AUTO: 29.7 PG (ref 26.5–33)
MCHC RBC AUTO-ENTMCNC: 34 G/DL (ref 31.5–36.5)
MCV RBC AUTO: 87 FL (ref 78–100)
MONOCYTES # BLD AUTO: 0.3 10E9/L (ref 0–1.3)
MONOCYTES NFR BLD AUTO: 5.5 %
NEUTROPHILS # BLD AUTO: 1.9 10E9/L (ref 1.6–8.3)
NEUTROPHILS NFR BLD AUTO: 40.6 %
NRBC # BLD AUTO: 0 10*3/UL
NRBC BLD AUTO-RTO: 0 /100
PLATELET # BLD AUTO: 206 10E9/L (ref 150–450)
RBC # BLD AUTO: 4.68 10E12/L (ref 3.8–5.2)
WBC # BLD AUTO: 4.7 10E9/L (ref 4–11)

## 2017-01-13 PROCEDURE — 85025 COMPLETE CBC W/AUTO DIFF WBC: CPT | Performed by: INTERNAL MEDICINE

## 2017-01-13 PROCEDURE — 82565 ASSAY OF CREATININE: CPT | Performed by: INTERNAL MEDICINE

## 2017-01-13 PROCEDURE — 82306 VITAMIN D 25 HYDROXY: CPT | Performed by: INTERNAL MEDICINE

## 2017-01-13 PROCEDURE — 84450 TRANSFERASE (AST) (SGOT): CPT | Performed by: INTERNAL MEDICINE

## 2017-01-13 PROCEDURE — 99212 OFFICE O/P EST SF 10 MIN: CPT | Mod: ZF

## 2017-01-13 PROCEDURE — 84460 ALANINE AMINO (ALT) (SGPT): CPT | Performed by: INTERNAL MEDICINE

## 2017-01-13 PROCEDURE — 36415 COLL VENOUS BLD VENIPUNCTURE: CPT | Performed by: INTERNAL MEDICINE

## 2017-01-13 RX ORDER — COLCHICINE 0.6 MG/1
1 CAPSULE ORAL SEE ADMIN INSTRUCTIONS
Qty: 90 CAPSULE | Refills: 3 | Status: SHIPPED | OUTPATIENT
Start: 2017-01-13 | End: 2017-03-31

## 2017-01-13 ASSESSMENT — PAIN SCALES - GENERAL: PAINLEVEL: MODERATE PAIN (5)

## 2017-01-13 NOTE — MR AVS SNAPSHOT
After Visit Summary   1/13/2017    Samara Oropeza    MRN: [id number]           Patient Information     Date Of Birth          1995        Visit Information        Provider Department      1/13/2017 1:30 PM Austen Marquez MD [facility] Rheumatology        Today's Diagnoses     Behcet's syndrome (H)    -  1        Follow-ups after your visit        Follow-up notes from your care team     Return in about 2 months (around 3/13/2017).      Your next 10 appointments already scheduled     Jan 13, 2017  3:00 PM   FULL PULMONARY FUNCTION with  PFL BRIANA   [facility] Pulmonary Function Testing ([facility])    [address] [place] [id number] [phone]            Jan 17, 2017 10:10 AM   LUIZ Extremity with Ashtyn Hernandez PTA   [facility] Physical Therapy LUIZ ([facility])    [address] [place] [id number] [phone]            Jan 17, 2017 11:30 AM   Office Visit with EVI Cardona CNP   [facility] ([facility])    [address] [place] [phone]           Bring a current list of meds and any records pertaining to this visit.  For Physicals, please bring immunization records and any forms needing to be filled out.  Please arrive 10 minutes early to complete paperwork.            Jan 24, 2017 10:10 AM   LUIZ Extremity with Ashtyn Hernandez PTA   [facility] Physical Therapy LUIZ ([facility])    [address] [place] [id number] [phone]            Jan 31, 2017 10:00 AM   LUIZ Extremity with Jennifer Alvarenga PT   [facility] Physical Therapy LUIZ ([facility])    [address] [place] [id number] [phone]            Feb 17, 2017  1:30 PM   (Arrive by 1:15 PM)   Return Visit with EVI Vizcaino CNP    Gastroenterology and IBD ([facility])    [address] [facility]  4th Floor  [facility] [id number] [phone]            Mar 27, 2017  2:20 PM   (Arrive by 2:05 PM)   Return Botox with Frederick Bender MD   [facility] Physical Medicine and Rehabilitation ([facility])    [address] [facility] [facility] [facility]  3rd [facility] [id number] [phone]            Mar 31, 2017  2:00 PM   (Arrive by 1:45 PM)   Return Visit with Austen Marquez MD   [facility] Rheumatology ([facility])    [address] [facility] [facility]  3rd [facility] [id number] [phone]              Future tests that were ordered for you today     Open Future Orders        Priority Expected Expires Ordered    CBC with platelets differential Routine  2/12/2017 1/13/2017    AST Routine  2/12/2017 1/13/2017    ALT Routine  2/12/2017 1/13/2017    Creatinine Routine  2/12/2017 1/13/2017            Who to contact     If you have questions or need follow up information about today's clinic visit or your schedule please contact [facility] RHEUMATOLOGY directly at [phone].  Normal or non-critical lab and imaging results will be communicated to you by [email or web address], letter or phone within 4 business days after the clinic has received the results. If you do not hear from us within 7 days, please contact the clinic through [email or web address] or phone. If you have a critical or abnormal lab result, we will notify you by phone as soon as possible.  Submit refill requests through HowDo or call your pharmacy and they will forward the refill request to us. Please allow 3 business days for your refill to be completed.          Additional Information About Your Visit        [email or web address] Information     HowDo gives you secure access to your electronic health record. If you see a primary care provider, you can also send messages to your care team and make appointments. If you have questions, please  "call your primary care clinic.  If you do not have a primary care provider, please call [phone] and they will assist you.        Care EveryWhere ID     This is your Care EveryWhere ID. This could be used by other organizations to access your [facility] medical records  [id number]        Your Vitals Were     Pulse Height BMI (Body Mass Index) Pulse Oximetry          93 1.6 m (5' 3\") 27.11 kg/m2 99%         Blood Pressure from Last 3 Encounters:   01/13/17 102/70   01/11/17 108/69   01/05/17 112/64    Weight from Last 3 Encounters:   01/13/17 69.4 kg (153 lb)   01/11/17 69.582 kg (153 lb 6.4 oz)   01/05/17 68.493 kg (151 lb)              We Performed the Following     Vitamin D Deficiency          Where to get your medicines      These medications were sent to [facility] - [address], [facility], [place] [address]     Phone:  [phone]    - Colchicine 0.6 MG Caps       Primary Care Provider Office Phone # Fax #    Sonja EVI Laguerre [phone]       [facility] [address] [facility] [address]  [facility] [address]        Thank you!     Thank you for choosing [facility]  for your care. Our goal is always to provide you with excellent care. Hearing back from our patients is one way we can continue to improve our services. Please take a few minutes to complete the written survey that you may receive in the mail after your visit with us. Thank you!             Your Updated Medication List - Protect others around you: Learn how to safely use, store and throw away your medicines at www.disposemymeds.org.          This list is accurate as of: 1/13/17  2:08 PM.  Always use your most recent med list.                   Brand Name Dispense Instructions for use    albuterol 108 (90 BASE) MCG/ACT Inhaler    PROAIR HFA/PROVENTIL HFA/VENTOLIN HFA    1 Inhaler    Inhale 2 puffs into the lungs every 6 hours as needed " for shortness of breath / dyspnea or wheezing       amitriptyline 25 MG tablet    ELAVIL    45 tablet    Take 1 tablet (25 mg) by mouth At Bedtime May increase to 2, if no benefit after 2 weeks.  Feel free to call / page the nurse for Dr. Mcmahon at [phone] / [phone] with questions regarding this order.       * apremilast 30 MG tablet    OTEZLA    60 tablet    Take 1 tablet (30 mg) by mouth 2 times daily       * Apremilast 10 & 20 & 30 MG Tbpk    OTEZLA    27 tablet    Take one tablet in the morning on day 1, then take one tablet every 12 hours on days 2 thru 14, according to the instructions on the packet.       betamethasone valerate 0.1 % cream    VALISONE     Apply topically 2 times daily       * botulinum toxin type A 100 UNITS injection    BOTOX    200 Units    Inject 200 Units into the muscle every 3 months       * BOTOX IJ      Inject 150 Units into the muscle once Lot: /C3 Exp: 05/2019       * BOTOX IJ      Inject 150 Units into the muscle Lot # /C3  Exp: 8/2019       carbamide peroxide 6.5 % otic solution    DEBROX     5 drops 2 times daily       clobetasol 0.05 % cream    TEMOVATE    60 g    Apply topically 2 times daily       Colchicine 0.6 MG Caps     90 capsule    Take 0.6 mg by mouth See Admin Instructions 2 capsules in AM and 1 capsule in PM       * dicyclomine 20 MG tablet    BENTYL    90 tablet    Take 1 tablet (20 mg) by mouth every 6 hours       * dicyclomine 10 MG capsule    BENTYL    120 capsule    Take 1-2 capsules (10-20 mg) by mouth 2 times daily as needed       EPINEPHrine 0.3 MG/0.3ML injection    EPIPEN    2 each    Inject 0.3 mLs into the muscle once as needed for anaphylaxis for 1 dose. And call 911.       EXCEDRIN MIGRAINE PO      Take by mouth as needed       guanFACINE 1 MG tablet    TENEX    180 tablet    3 tablets in the morning and 3 tablets in the evening       hyoscyamine 0.125 MG tablet    ANASPAZ/LEVSIN    40 tablet    Take 1-2 tablets (125-250 mcg) by  mouth every 4 hours as needed for cramping       hypromellose 0.3 % Soln ophthalmic solution    GENTEAL     1 drop every hour as needed       LACTAID PO      Take by mouth daily       lidocaine 2 % topical gel    XYLOCAINE     Apply 1 Tube topically daily       lidocaine 5 % Patch    LIDODERM    30 patch    Apply up to 3 patches to painful area at once for up to 12 h within a 24 h period.  Remove after 12 hours.       lidocaine visc 2% 2.5mL/5mL & maalox/mylanta w/ simeth 2.5mL/5mL & diphenhydrAMINE 5mg/5mL Susp suspension    MAGIC Mouthwash    1 Bottle    Swish and swallow 10 mLs in mouth every 6 hours as needed for mouth sores       linaclotide 290 MCG capsule    LINZESS    30 capsule    Take 1 capsule (290 mcg) by mouth every morning (before breakfast)       LORazepam 1 MG tablet    ATIVAN    75 tablet    Take 1-2 tablets (1-2 mg) by mouth every 8 hours as needed for other (abdominal pain) Do not operate a vehicle after taking this medication       lubiprostone 24 MCG capsule    AMITIZA    30 capsule    Take 1 capsule (24 mcg) by mouth 2 times daily (with meals)       meclizine 25 MG tablet    ANTIVERT    30 tablet    Take 1 tablet (25 mg) by mouth every 6 hours as needed for dizziness       metoclopramide 5 MG tablet    REGLAN    60 tablet    Take 1 tablet (5 mg) by mouth 2 times daily as needed       Multi-vitamin Tabs tablet      Take 1 tablet by mouth daily       norgestimate-ethinyl estradiol 0.25-35 MG-MCG per tablet    ORTHO-CYCLEN, SPRINTEC    84 tablet    Take 1 tablet by mouth daily       omeprazole 40 MG capsule    priLOSEC    30 capsule    Take 1 capsule (40 mg) by mouth daily       ondansetron 4 MG ODT tab    ZOFRAN-ODT    120 tablet    Take 1 tablet (4 mg) by mouth 3 times daily as needed for nausea       polyethylene glycol powder    MIRALAX/GLYCOLAX     Take 17 g by mouth 2 times daily       promethazine 25 MG tablet    PHENERGAN    20 tablet    Take 0.5-1 tablets (12.5-25 mg) by mouth every 6  hours as needed for nausea       psyllium 63 % Powd    METAMUCIL SMOOTH TEXTURE    1 Bottle    Take 3 teaspoonful by mouth 3 times daily Mix in 8 ounces of water       RANITIDINE 75 PO      Take  by mouth. As needed for GI upset       sucralfate 1 GM/10ML suspension    CARAFATE    1200 mL    Take 10 mLs (1 g) by mouth 4 times daily       SUMAtriptan 100 MG tablet    IMITREX    18 tablet    Take 1 tablet (100 mg) by mouth at onset of headache for migraine May repeat in 2 hours. Max 2 tablets/24 hours.       triamcinolone 0.1 % cream    KENALOG    15 g    Apply sparingly to oral ulcers three times daily for 14 days as needed.       UNABLE TO FIND      daily MEDICATION NAME: Peppermint supplement       VITAMIN B6 PO          * Notice:  This list has 7 medication(s) that are the same as other medications prescribed for you. Read the directions carefully, and ask your doctor or other care provider to review them with you.

## 2017-01-13 NOTE — Clinical Note
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 Lilliana Miramontes- complicated migraine  # On Sprintec for birth control    Meets ISG 1990 criteria for Behcets. Initially, very good response to colchicine which has reduced the intensity and frequency of the oral ulcers in the past. Patient had one genital ulcer and few oral ulcers in the last few months, still  Has folliculitis in skin. Seen Derm Dr. Elliott. He has recommended otezla. Otezla is approved for her now. Not started yet.     Has tried prednisone in the past, but does not tolerate well due to mood issues, and has been off prednisone for the past 6+ months. Has H/o Tourettes. Followed by Dr. Miramontes.     She continues to have GI symptoms  Colonoscopy was negative in the past.  Has gastroparesis.  Behcets may have GI involvement but no evidence of GI inflammation at this time. Dr. Baker has evaluated her.   She gets visual symptoms  But there is no evidence of uveitis including posterior uveitis or retinal vasculitis, denies symptoms at today's visit. She has seen Dr. Garcia in the past and also seen Dr. Heaton around 2/16      She also likely has fibromyalgia which is uncontrolled. We will address Behcets  first.     For chest symptoms, she has been referred to pulmonology by GI.     Recommend:   - Continue colchicine 1.8mg qday for oral ulcers. Add otezla in addition to colchicine. We discussed otezla side effects vs benefits today.   - Continue Triamcinolone acetonide cream (0.1 percent in Orabase) three to four times daily during attacks of oral ulcers and be used until pain from the ulcer ceases. Potent topical corticosteroids may be used for genital ulcers. Also use magic mouthwash as needed.  - Other comorbidities to watch for in Behcets: Vascular disease in Behçet s is more common in men. Large vessel vascular involvement occurs in approximately one-third of patients with Behcet s disease. Pulmonary artery vasculitis can present with hemoptysis (misdiagnosis can lead to poor prognosis). Secondary fibromyalgia, CNS disease, amyloidosis, sacroiliitis.     RTC 2 months.    Data Unavailable    No orders of the defined types were placed in this encounter.       Medications Discontinued During This Encounter   Medication Reason     Colchicine 0.6 MG CAPS Reorder     Current Outpatient Prescriptions   Medication Sig Dispense Refill     Colchicine 0.6 MG CAPS Take 0.6 mg by mouth See Admin Instructions 2 capsules in AM and 1 capsule in PM 90 capsule 3     albuterol (PROAIR HFA/PROVENTIL HFA/VENTOLIN HFA) 108 (90 BASE) MCG/ACT Inhaler Inhale 2 puffs into the lungs every 6 hours as needed for shortness of breath / dyspnea or wheezing 1 Inhaler 1     dicyclomine (BENTYL) 10 MG capsule Take 1-2 capsules (10-20 mg) by mouth 2 times daily as needed 120 capsule 3     amitriptyline (ELAVIL) 25 MG tablet Take 1 tablet (25 mg) by mouth At Bedtime May increase to 2, if no benefit after 2 weeks.  Feel free to call / page the nurse for Dr. Mcmahon at [phone] / [phone] with questions regarding this order. 45 tablet 3     lubiprostone (AMITIZA) 24 MCG capsule Take 1 capsule (24 mcg) by mouth 2 times daily (with meals) 30  capsule 3     OnabotulinumtoxinA (BOTOX IJ) Inject 150 Units into the muscle Lot # /C3  Exp: 8/2019       norgestimate-ethinyl estradiol (ORTHO-CYCLEN, SPRINTEC) 0.25-35 MG-MCG per tablet Take 1 tablet by mouth daily 84 tablet 3     SUMAtriptan (IMITREX) 100 MG tablet Take 1 tablet (100 mg) by mouth at onset of headache for migraine May repeat in 2 hours. Max 2 tablets/24 hours. 18 tablet 3     promethazine (PHENERGAN) 25 MG tablet Take 0.5-1 tablets (12.5-25 mg) by mouth every 6 hours as needed for nausea 20 tablet 1     sucralfate (CARAFATE) 1 GM/10ML suspension Take 10 mLs (1 g) by mouth 4 times daily 1200 mL 2     metoclopramide (REGLAN) 5 MG tablet Take 1 tablet (5 mg) by mouth 2 times daily as needed 60 tablet 2     meclizine (ANTIVERT) 25 MG tablet Take 1 tablet (25 mg) by mouth every 6 hours as needed for dizziness 30 tablet 1     LORazepam (ATIVAN) 1 MG tablet Take 1-2 tablets (1-2 mg) by mouth every 8 hours as needed for other (abdominal pain) Do not operate a vehicle after taking this medication 75 tablet 0     apremilast (OTEZLA) 30 MG tablet Take 1 tablet (30 mg) by mouth 2 times daily 60 tablet 3     Apremilast (OTEZLA) 10 & 20 & 30 MG TBPK Take one tablet in the morning on day 1, then take one tablet every 12 hours on days 2 thru 14, according to the instructions on the packet. 27 tablet 0     Magic Mouthwash (FV std formula) lidocaine visc 2% 2.5mL/5mL & maalox/mylanta w/ simeth 2.5mL/5mL & diphenhydrAMINE 5mg/5mL Swish and swallow 10 mLs in mouth every 6 hours as needed for mouth sores 1 Bottle 1     clobetasol (TEMOVATE) 0.05 % cream Apply topically 2 times daily 60 g 0     OnabotulinumtoxinA (BOTOX IJ) Inject 150 Units into the muscle once Lot: /C3 Exp: 05/2019       botulinum toxin type A (BOTOX) 100 UNITS injection Inject 200 Units into the muscle every 3 months 200 Units 3     ondansetron (ZOFRAN-ODT) 4 MG disintegrating tablet Take 1 tablet (4 mg) by mouth 3 times daily as needed for  nausea 120 tablet 3     lidocaine (LIDODERM) 5 % patch Apply up to 3 patches to painful area at once for up to 12 h within a 24 h period.  Remove after 12 hours. 30 patch 1     guanFACINE (TENEX) 1 MG tablet 3 tablets in the morning and 3 tablets in the evening 180 tablet 3     omeprazole (PRILOSEC) 40 MG capsule Take 1 capsule (40 mg) by mouth daily 30 capsule 9     linaclotide (LINZESS) 290 MCG capsule Take 1 capsule (290 mcg) by mouth every morning (before breakfast) 30 capsule 1     UNABLE TO FIND daily MEDICATION NAME: Peppermint supplement       hyoscyamine (ANASPAZ,LEVSIN) 0.125 MG tablet Take 1-2 tablets (125-250 mcg) by mouth every 4 hours as needed for cramping 40 tablet 1     Aspirin-Acetaminophen-Caffeine (EXCEDRIN MIGRAINE PO) Take by mouth as needed        hypromellose (GENTEAL) 0.3 % SOLN 1 drop every hour as needed       betamethasone valerate (VALISONE) 0.1 % cream Apply topically 2 times daily       carbamide peroxide (DEBROX) 6.5 % otic solution 5 drops 2 times daily       psyllium (METAMUCIL SMOOTH TEXTURE) 63 % POWD Take 3 teaspoonful by mouth 3 times daily Mix in 8 ounces of water 1 Bottle 11     polyethylene glycol (MIRALAX/GLYCOLAX) powder Take 17 g by mouth 2 times daily       Lactase (LACTAID PO) Take by mouth daily       multivitamin, therapeutic with minerals (MULTI-VITAMIN) TABS Take 1 tablet by mouth daily       Pyridoxine HCl (VITAMIN B6 PO)        dicyclomine (BENTYL) 20 MG tablet Take 1 tablet (20 mg) by mouth every 6 hours 90 tablet 1     lidocaine (XYLOCAINE) 2 % jelly Apply 1 Tube topically daily       triamcinolone (KENALOG) 0.1 % cream Apply sparingly to oral ulcers three times daily for 14 days as needed. 15 g 1     EPINEPHrine (EPIPEN) 0.3 MG/0.3ML injection Inject 0.3 mLs into the muscle once as needed for anaphylaxis for 1 dose. And call 911. 2 each 1     Ranitidine HCl (RANITIDINE 75 PO) Take  by mouth. As needed for GI upset       Dr.Prabhu Valdez Marquez MD.            Active Problem List:     Patient Active Problem List    Diagnosis Date Noted     Left knee pain 10/20/2016     Priority: Medium     Patellofemoral instability 10/20/2016     Priority: Medium     Fibromyalgia 08/04/2016     Priority: Medium     Rheumatoid arthritis of multiple sites without rheumatoid factor (H) 08/04/2016     Priority: Medium     Raynaud's disease without gangrene 08/04/2016     Priority: Medium     Chronic abdominal pain 08/04/2016     Priority: Medium     Palpitations 01/12/2016     Priority: Medium     On colchicine therapy 10/30/2015     Priority: Medium     Spell of shaking 05/06/2015     Priority: Medium     Migraine 02/04/2015     Priority: Medium     Problem list name updated by automated process. 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 Symptoms well managed. Originally diagnosed at [facility] neurology. (Dr. Simpson)           IBS (irritable bowel syndrome) 05/18/2009     Priority: Medium     Seasonal allergic rhinitis 05/18/2009     Priority: Medium          History of Present Illness:     Chief Complaint   Patient presents with     RECHECK     2 month follow up for Rheumatoid arthritis of multiple sites without rheumatoid factor (H)     [place] Marilyn [place] [facility] [place]    Original presentation 2014:    Ms Oropeza is a 19 y.o. female who presents with one year of presentation of painful oral ulcers (monthly) once that have worsened with two episodes in the last two months that presented with painful vulvar or vaginal ulcers (2  episodes so far every month) [not sure if they leave scar] as well that timing coincides with [place]enses [place] [place]).   ORAL ULCERS: last two episodes were severe, painful, white base with erythematous halo, that appear and disappear in the matter of 1-2 weeks without, does not bleed and doesn't respond to any treatment used (magic mouthwash), 10-15 in number with the biggest one being dime size.     VAGINAL ULCERS: 2-3 in number between labia majora and minora that occur simultaneously with oral ulcers, painful, non bleeding ulcers that are erythematous, no pus.     FOLLICULITIS: patient reports having spots in legs specially around ankle and knee, but arms, and neck are affected as well. Small lesions that resemble ingrown hair, most are red erythematous elevated lesions, well demarcated, some with liquid that patient refers as pimple like.     SKIN RASHES: welts and red macules with well defined borders that are pruritic and non-ulcerative on chest, arms and back. Benadryl relieves.     ARTHRALGIAS: In descending order of severity: hips, knees, wrists, shoulders, neck, lumbar, fingers.   C/o morning stiffness in hips, back and neck lasting for about until noon.     Ms. Oropeza reports they present in severe flares and never fully resolve, but do get better. She has seen some swelling and warmth on the affected joints but has not noticed and redness. Has tried Tylenol, ibuprofen, and hydrocodone with not much benefit.     FEVERS: Reports 3-4 sporadic episodes per month of self limited fevers of 38 C that occur during the evenings and associate facial flushing.     HEADACHES: occur daily and are bitemporal and irradiate occipitally, pulsatile in nature, intensity varies from intense to mild, are worsened with movement. On treatment with ibuprofen and somatriptan without any positive results.     VISION CHANGES: Patient reports that suddenly she would only see a gray blotch in her right eyes and would persist like  this for a day and a half. Also reports blurriness in her left eye. Presence of pain and burning sensation of eyeball with associated redness. Has changed prescription glasses in the matter of 2 years 3 times. She has had opthalmology exam and was not suggestive of any evidence of uveitis.   She was also evaluated in ED for a dizzy spell.     ABD PAIN W/ INTERMITTENT DIARRHEA AND CONSTIPATION: Patient reports abdominal pain that is intermittent, periods of 7 days without bowel movement and feeling bloated with change of pattern to diarrhea (liquidy without blood nor mucus, non foul smelling). Has taken Bentyl, Zegrid, and rinetidine with mild clinical improvement.  She also reports small red nodules after each needle stick for blood draw.   Neurology saw her back then and was not impressed with her presentation as physical examination was normal. Also MRI brain normal: Findings: No definite hemorrhage, mass affect, midline shift, or ventriculomegaly is noted.There are a few scattered non specific white matter T2 hyperintensities. Axial diffusion weighted images are unremarkable.     The major vascular structures appear patent. There is opacification and severe mucosal thickening in the right maxillary sinus. The remaining paranasal sinuses, and mastoid air cells are clear. Orbits are unremarkable  She has + HSV-1 IGG. Seen ID. Negative for Herpes simplex virus culture. HSV IGM I/II COMBINATION SENT TO [facility]  RESULTS = <0.91  REF RANGE: <0.91 = NEGATIVE  ID did not think HSV could explain her mouth and genital sores.     CRP within normal limits. HIV negative. CBC within normal limits; UA negative. Creatinine within normal limits   CYNDIE neg.  Antigliadin neg.   ANti TTG neg.   [facility] 2014: Colonoscopy and endoscopy neg; result not available. Not sure if biopsies were done.   Raynaud's phenomenon:  Onset: about 2013  Digits: all fingers  Digital ulcers: no  Color changes: white ---> purple and red  Duration of an  attack: About couple of hours per mom  Pain during attack: not clear pain but bruise like feeling  Frequency of attacks: few times a month  Family history of Raynauds: no  GERD: very long time    No hemoptysis.   No miscarriages/ no thrombosis in past.   No family or personal history of psoriasis, ulcerative colitis or chron's disease.   No buttock pain or low back pain or stiffness in AM    Interim history:  Dec 2014- Multiple mouth ulcers and did not eat for 5 days. This coincided with periods. Colchicine 0.6mg PO BID started in Nov 2014.   Prednisone taper in Dec 20mg to 0 in 4 weeks. She also used magic mouthwash. Kenalog cream. After going to the ER, 3 days later her ulcers were better. She thinks it improved after the menstruation stopped.   LMP 3 weeks ago.   COLCHICINE HAS HELPED A LOT REDUCING THE INTENSITY OF MENSTRUATION ASSOCIATED ORAL AND VULVAR ULCERS.     Interim history: 6/15    Since last visit only two episodes of mouth sores- small sized 6   No genital ulcers since last visit.   Colchicine 1.2 mg in AM and 0.6mg in PM keeps her symptoms under control.     Admitted in 5/15:  Admission Diagnoses:  - LUE weakness  - spell  - hx of migraines  - hx of Tourette syndrome  - hx of Behcet's disease    Discharge Diagnoses:   - spell likely 2/2 anxiety  - hx of migraines  - hx of Tourette syndrome  - hx of Behcet's disease    CT and MRI brain was normal.     Seeing Dr. Lilliana Miramontes soon as outpatient.     Dr. Garcia did not find any intraocular inflammation.      Interm 10/30/2015  ED for migraine. Continues to see neuro - MRI brain without lesions noted. Saw GI - upper barium normal. May be considering repeat colo. Worsening lesions in mouth and genitals. Has had continuous lesion in mouth x 2 months. 2 genital ulcers. Had fracture of right sesamoid in foot. Continues to have low grade fevers. New folliculitis on chest, legs and arms.     Interim hx: 1/11/2016    Pt states that since last visit she  "continues to have oral ulcers and has noted more folliculitis-like lesions on her lower extremities.  She states that on her right lower leg she had a red macular spot that has since resolved.  She denies uveitis.  She states that the colchicine initially helped but then stopped working.  She takes 0.6 mg TID.  She stopped taking her prednisone last week as she feels like this hasn't helped.  She hasn't had any episodes of vaginal ulcers.      She saw GI who stated she has gastroparesis.  She is seeing Cardiology later this week for possible syncopal episodes.      Interim history 5/16  Mouth ulcers X 1 last month  Genital ulcers - none since last visit.     Bilateral MCPs pain, knee pain and low back pain +ve increased since last visit  Morning stiffness X 2 hours (increasing)   5-6/10    \"overall myalgia\" has gotten worse    C/o pseudofolliculitis lesion on anterior shins bilaterally worse    Interim history: (7/18/2016)  Patient has just started Humira for 2 doses on 7/1 and 7/15 and reported minimal improvement of her hip pain but otherwise, did not notice anything different.     She reported painful mouth ulcer once a month since last visit but noticed that it has been getting deeper.   Denied any genital ulcers.    Still has ongoing bilateral MCPs pain, knee pain but this has been intermittent and she did not have any much pain today. She reported 2-3 hours morning stiffness of both hands and pain score of 6/10 at her knees and 8/10 of her hands but currently takes no pain medication except prn ativan which she takes when her muscle is really tight.     The only concern is that she gained weight even though she has not been eating much (eat once a day) and do exercises every day for 1 hour (with video of yoga, pilates). Her mother wonders if she needs to have some labs work up include sex hormone, thyroid, cortisol.    Interval history 9/14/16  Patient was started Humira at the last visit, patient reports " worsening of skin ulcers since starting Humira and stopped taking Humura for the past 2 weeks. Patient reports oral and genital ulcers has been stable even before starting Humira and has not had any interval oral/genital ulcers. However she reports recurrent skin ulcers occurring on a daily basis that affects her chest and anterior legs. Patient also has stopped taking prednisone, she reports that she doesn't feel the prednisone helps, her last dose of prednisone was 6+ months ago. Patient also report worsening low back pain that sometimes causes her to limp.    In the interval patient also visited ED on 8/31/16 for left hand pain and what the patient describes as phlebitis, no medication or procedure was done and the patient was discharged with a splint. Patient also reported 1 episode of chest pressure that was associated with dyspnea and numbness of the left arm, she was shopping in a supermarket at the time of onset, and the pressure resolved after she took a nap.    Patient denies any infectious symptoms in the interval, no fever, chills, night sweat, cough, dysuria, denies eye pain or visual changes.    November 4, 2016  Oct - had one genital ulcer  Oral ulcers X 5- around menstruation time.   Knee pain- chronic on the left side  Dizziness spells - on and off; been to the ER for that in the past.   On and off migraines  July 2013 - s/p MPFL reconstruction plus LRL 7/2013  Morning stiffness in knee (left) X 1 hour    Severe jaw pain and chest pain- patient wondering if this is Tourette's or esophageal spasms. Cardiac workup negative.   Fever     January 13, 2017  Yesterday in [place] [facility] [place] with orthostatic syncope from dehydration.   Chest pain on and off still continues. Painful with deep breaths.   Oral ulcers - few minor ones lasting for one week;   One major one in roof of mouth lasting for about one week.   Knee pain +ve - reviewed the recent MRI with her orthopedic surgeon.   On and off migraines  otezla is  approved. Still waiting to receive the meds.     BP Readings from Last 3 Encounters:   01/13/17 102/70   01/11/17 108/69   01/05/17 112/64     Complete ROS negative except for above         Past Medical History:     Past Medical History   Diagnosis Date     Cervical adenitis May 2010     Constipation, chronic 1994     Behcet's disease (H)      Gastro-oesophageal reflux disease      Anxiety      Tourette's      Chronic abdominal pain      Migraines      Syncope      Palpitations      Gastroparesis      Past Surgical History   Procedure Laterality Date     Dental surgery  1996     Teeth removal     Endoscopy upper, colonoscopy, combined  2005     Arthroscopy knee with patellar realignment  7/25/2013     Procedure: ARTHROSCOPY KNEE WITH PATELLAR REALIGNMENT;  Left Knee Arthroscopy, Medial Patellofemoral Ligament Reconstruction with Allograft  ;  Surgeon: Jennifer Acevedo MD;  Location: [facility]          Social History:     Social History     Occupational History     Not on file.     Social History Main Topics     Smoking status: Never Smoker      Smokeless tobacco: Never Used     Alcohol Use: No     Drug Use: No     Sexual Activity: No          Family History:     Family History   Problem Relation Age of Onset     Depression Mother      Neurologic Disorder Mother      Migraines, take imitrex injection.  Also in maternal grandmother.       Alcohol/Drug Father      Hypertension Father      Depression Father      Cardiovascular Maternal Grandmother      Depression Maternal Grandmother      Hypertension Maternal Grandmother      Alzheimer Disease Maternal Grandmother      Cardiovascular Maternal Grandfather      Hypertension Maternal Grandfather      Depression Maternal Grandfather      Alcohol/Drug Maternal Grandfather      Cardiovascular Paternal Grandmother      Hypertension Paternal Grandmother      Cardiovascular Paternal Grandfather      Hypertension Paternal Grandfather      Glaucoma No family hx of      Macular  Degeneration No family hx of           Allergies:     Allergies   Allergen Reactions     Amoxil [Penicillins] Rash     Dad unsure of reaction.     Contrast Dye Rash     Contrast Media Ready-[place] [facility] [place].; Contrast [employer] [place].  NOTE: this is a contrast media oral with iodine. Premedicate with methylpred standard for IV contrast, request barium contrast for oral contrast.     Kiwi Swelling     Orange Fruit [Citrus] Anaphylaxis     Pineapple Anaphylaxis, Difficulty breathing and Rash     Milk Protein Extract Hives     Reglan [Metoclopramide] Other (See Comments)     IV dose only, in ER, rapid heart rate.     Ace Inhibitors      Difficulty in breathing and GI upset     Amoxicillin-Pot Clavulanate      Latex      Midazolam Unknown     parent states that when pt takes this medication, she wakes up being very violent .     Versed      Coming out of pelvic exam at age of 5, was kicking and screaming when coming out of the versed.     Adhesive Tape Rash     Azithromycin Hives and Rash     Cephalexin Itching and Rash     Itchy mouth     Keflex [Cephalexin-Fd&C Yellow #6] Hives          Medications:     Current Outpatient Prescriptions   Medication Sig Dispense Refill     Colchicine 0.6 MG CAPS Take 0.6 mg by mouth See Admin Instructions 2 capsules in AM and 1 capsule in PM 90 capsule 3     albuterol (PROAIR HFA/PROVENTIL HFA/VENTOLIN HFA) 108 (90 BASE) MCG/ACT Inhaler Inhale 2 puffs into the lungs every 6 hours as needed for shortness of breath / dyspnea or wheezing 1 Inhaler 1     dicyclomine (BENTYL) 10 MG capsule Take 1-2 capsules (10-20 mg) by mouth 2 times daily as needed 120 capsule 3     amitriptyline (ELAVIL) 25 MG tablet Take 1 tablet (25 mg) by mouth At Bedtime May increase to 2, if no benefit after 2 weeks.  Feel free to call / page the nurse for Dr. Mcmahon at [phone] / [phone] with questions regarding this order. 45 tablet 3     lubiprostone (AMITIZA) 24 MCG capsule Take 1  capsule (24 mcg) by mouth 2 times daily (with meals) 30 capsule 3     OnabotulinumtoxinA (BOTOX IJ) Inject 150 Units into the muscle Lot # /C3  Exp: 8/2019       norgestimate-ethinyl estradiol (ORTHO-CYCLEN, SPRINTEC) 0.25-35 MG-MCG per tablet Take 1 tablet by mouth daily 84 tablet 3     SUMAtriptan (IMITREX) 100 MG tablet Take 1 tablet (100 mg) by mouth at onset of headache for migraine May repeat in 2 hours. Max 2 tablets/24 hours. 18 tablet 3     promethazine (PHENERGAN) 25 MG tablet Take 0.5-1 tablets (12.5-25 mg) by mouth every 6 hours as needed for nausea 20 tablet 1     sucralfate (CARAFATE) 1 GM/10ML suspension Take 10 mLs (1 g) by mouth 4 times daily 1200 mL 2     metoclopramide (REGLAN) 5 MG tablet Take 1 tablet (5 mg) by mouth 2 times daily as needed 60 tablet 2     meclizine (ANTIVERT) 25 MG tablet Take 1 tablet (25 mg) by mouth every 6 hours as needed for dizziness 30 tablet 1     LORazepam (ATIVAN) 1 MG tablet Take 1-2 tablets (1-2 mg) by mouth every 8 hours as needed for other (abdominal pain) Do not operate a vehicle after taking this medication 75 tablet 0     apremilast (OTEZLA) 30 MG tablet Take 1 tablet (30 mg) by mouth 2 times daily 60 tablet 3     Apremilast (OTEZLA) 10 & 20 & 30 MG TBPK Take one tablet in the morning on day 1, then take one tablet every 12 hours on days 2 thru 14, according to the instructions on the packet. 27 tablet 0     Magic Mouthwash (FV std formula) lidocaine visc 2% 2.5mL/5mL & maalox/mylanta w/ simeth 2.5mL/5mL & diphenhydrAMINE 5mg/5mL Swish and swallow 10 mLs in mouth every 6 hours as needed for mouth sores 1 Bottle 1     clobetasol (TEMOVATE) 0.05 % cream Apply topically 2 times daily 60 g 0     OnabotulinumtoxinA (BOTOX IJ) Inject 150 Units into the muscle once Lot: /C3 Exp: 05/2019       botulinum toxin type A (BOTOX) 100 UNITS injection Inject 200 Units into the muscle every 3 months 200 Units 3     ondansetron (ZOFRAN-ODT) 4 MG disintegrating tablet  Take 1 tablet (4 mg) by mouth 3 times daily as needed for nausea 120 tablet 3     lidocaine (LIDODERM) 5 % patch Apply up to 3 patches to painful area at once for up to 12 h within a 24 h period.  Remove after 12 hours. 30 patch 1     guanFACINE (TENEX) 1 MG tablet 3 tablets in the morning and 3 tablets in the evening 180 tablet 3     omeprazole (PRILOSEC) 40 MG capsule Take 1 capsule (40 mg) by mouth daily 30 capsule 9     linaclotide (LINZESS) 290 MCG capsule Take 1 capsule (290 mcg) by mouth every morning (before breakfast) 30 capsule 1     UNABLE TO FIND daily MEDICATION NAME: Peppermint supplement       hyoscyamine (ANASPAZ,LEVSIN) 0.125 MG tablet Take 1-2 tablets (125-250 mcg) by mouth every 4 hours as needed for cramping 40 tablet 1     Aspirin-Acetaminophen-Caffeine (EXCEDRIN MIGRAINE PO) Take by mouth as needed        hypromellose (GENTEAL) 0.3 % SOLN 1 drop every hour as needed       betamethasone valerate (VALISONE) 0.1 % cream Apply topically 2 times daily       carbamide peroxide (DEBROX) 6.5 % otic solution 5 drops 2 times daily       psyllium (METAMUCIL SMOOTH TEXTURE) 63 % POWD Take 3 teaspoonful by mouth 3 times daily Mix in 8 ounces of water 1 Bottle 11     polyethylene glycol (MIRALAX/GLYCOLAX) powder Take 17 g by mouth 2 times daily       Lactase (LACTAID PO) Take by mouth daily       multivitamin, therapeutic with minerals (MULTI-VITAMIN) TABS Take 1 tablet by mouth daily       Pyridoxine HCl (VITAMIN B6 PO)        dicyclomine (BENTYL) 20 MG tablet Take 1 tablet (20 mg) by mouth every 6 hours 90 tablet 1     lidocaine (XYLOCAINE) 2 % jelly Apply 1 Tube topically daily       triamcinolone (KENALOG) 0.1 % cream Apply sparingly to oral ulcers three times daily for 14 days as needed. 15 g 1     EPINEPHrine (EPIPEN) 0.3 MG/0.3ML injection Inject 0.3 mLs into the muscle once as needed for anaphylaxis for 1 dose. And call 911. 2 each 1     Ranitidine HCl (RANITIDINE 75 PO) Take  by mouth. As needed for  "GI upset            Physical Exam:   Blood pressure 102/70, pulse 93, height 1.6 m (5' 3\"), weight 69.4 kg (153 lb), SpO2 99 %, not currently breastfeeding.  Wt Readings from Last 4 Encounters:   01/13/17 69.4 kg (153 lb)   01/11/17 69.582 kg (153 lb 6.4 oz)   01/05/17 68.493 kg (151 lb)   12/06/16 71.759 kg (158 lb 3.2 oz)     Constitutional: well-developed, appearing stated age; cooperative  Eyes: nl EOM, PERRLA, vision, conjunctiva, sclera  ENT: No oral ulcer seen.   nl external ears, nose, hearing, lips, teeth, gums, throat  No mucous membrane lesions, normal saliva pool  Neck: no mass or thyroid enlargement  Resp: lungs clear to auscultation,   CV: RRR, no murmurs, rubs or gallops, no edema  GI: no ABD mass or tenderness, no HSM  : not tested  Lymph: no cervical, supraclavicular or epitrochlear nodes  MS: Left knee tenderness to full ROM. However ROM full.   All TMJ, neck, shoulder, elbow, wrist, MCP/PIP/DIP, spine, hip, ankle, and foot MTP/IP joints were examined and  found normal except tenderness on even light touch of both knees with minimal swelling but no warmth. No active synovitis or deformity. Full ROM.  Normal  strength. Fist 100%.  No dactylitis,  tenosynovitis, enthesopathy.  Skin: no nail pitting, alopecia, rash  Psych: nl judgement, orientation, memory, affect.         Data:       Austen Marquez MD      "

## 2017-01-13 NOTE — PROGRESS NOTES
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 Lilliana Miramontes- complicated migraine  # On Sprintec for birth control    Meets ISG 1990 criteria for Behcets. Initially, very good response to colchicine which has reduced the intensity and frequency of the oral ulcers in the past. Patient had one genital ulcer and few oral ulcers in the last few months, still  Has folliculitis in skin. Seen Derm Dr. Elliott. He has recommended otezla. Otezla is approved for her now. Not started yet.     Has tried prednisone in the past, but does not tolerate well due to mood issues, and has been off prednisone for the past 6+ months. Has H/o Tourettes. Followed by Dr. Miramontes.     She continues to have GI symptoms  Colonoscopy was negative in the past.  Has gastroparesis.  Behcets may have GI involvement but no evidence of GI inflammation at this time. Dr. Baker has evaluated her.   She gets visual symptoms  But there is no evidence of uveitis including posterior uveitis or retinal vasculitis, denies symptoms at today's visit. She has seen Dr. Garcia in the past and also seen Dr. Heaton around 2/16      She also likely has fibromyalgia which is uncontrolled. We will address Behcets first.     For chest symptoms, she has been referred to pulmonology by GI.     Recommend:    - Continue colchicine 1.8mg qday for oral ulcers. Add otezla in addition to colchicine. We discussed otezla side effects vs benefits today.   - Continue Triamcinolone acetonide cream (0.1 percent in Orabase) three to four times daily during attacks of oral ulcers and be used until pain from the ulcer ceases. Potent topical corticosteroids may be used for genital ulcers. Also use magic mouthwash as needed.  - Other comorbidities to watch for in Behcets: Vascular disease in Behçet s is more common in men. Large vessel vascular involvement occurs in approximately one-third of patients with Behcet s disease. Pulmonary artery vasculitis can present with hemoptysis (misdiagnosis can lead to poor prognosis). Secondary fibromyalgia, CNS disease, amyloidosis, sacroiliitis.     RTC 2 months.    Data Unavailable    No orders of the defined types were placed in this encounter.       Medications Discontinued During This Encounter   Medication Reason     Colchicine 0.6 MG CAPS Reorder     Current Outpatient Prescriptions   Medication Sig Dispense Refill     Colchicine 0.6 MG CAPS Take 0.6 mg by mouth See Admin Instructions 2 capsules in AM and 1 capsule in PM 90 capsule 3     albuterol (PROAIR HFA/PROVENTIL HFA/VENTOLIN HFA) 108 (90 BASE) MCG/ACT Inhaler Inhale 2 puffs into the lungs every 6 hours as needed for shortness of breath / dyspnea or wheezing 1 Inhaler 1     dicyclomine (BENTYL) 10 MG capsule Take 1-2 capsules (10-20 mg) by mouth 2 times daily as needed 120 capsule 3     amitriptyline (ELAVIL) 25 MG tablet Take 1 tablet (25 mg) by mouth At Bedtime May increase to 2, if no benefit after 2 weeks.  Feel free to call / page the nurse for Dr. Mcmahon at [phone] / [phone] with questions regarding this order. 45 tablet 3     lubiprostone (AMITIZA) 24 MCG capsule Take 1 capsule (24 mcg) by mouth 2 times daily (with meals) 30 capsule 3     OnabotulinumtoxinA (BOTOX IJ) Inject 150 Units into the muscle Lot # /C3   Exp: 8/2019       norgestimate-ethinyl estradiol (ORTHO-CYCLEN, SPRINTEC) 0.25-35 MG-MCG per tablet Take 1 tablet by mouth daily 84 tablet 3     SUMAtriptan (IMITREX) 100 MG tablet Take 1 tablet (100 mg) by mouth at onset of headache for migraine May repeat in 2 hours. Max 2 tablets/24 hours. 18 tablet 3     promethazine (PHENERGAN) 25 MG tablet Take 0.5-1 tablets (12.5-25 mg) by mouth every 6 hours as needed for nausea 20 tablet 1     sucralfate (CARAFATE) 1 GM/10ML suspension Take 10 mLs (1 g) by mouth 4 times daily 1200 mL 2     metoclopramide (REGLAN) 5 MG tablet Take 1 tablet (5 mg) by mouth 2 times daily as needed 60 tablet 2     meclizine (ANTIVERT) 25 MG tablet Take 1 tablet (25 mg) by mouth every 6 hours as needed for dizziness 30 tablet 1     LORazepam (ATIVAN) 1 MG tablet Take 1-2 tablets (1-2 mg) by mouth every 8 hours as needed for other (abdominal pain) Do not operate a vehicle after taking this medication 75 tablet 0     apremilast (OTEZLA) 30 MG tablet Take 1 tablet (30 mg) by mouth 2 times daily 60 tablet 3     Apremilast (OTEZLA) 10 & 20 & 30 MG TBPK Take one tablet in the morning on day 1, then take one tablet every 12 hours on days 2 thru 14, according to the instructions on the packet. 27 tablet 0     Magic Mouthwash (FV std formula) lidocaine visc 2% 2.5mL/5mL & maalox/mylanta w/ simeth 2.5mL/5mL & diphenhydrAMINE 5mg/5mL Swish and swallow 10 mLs in mouth every 6 hours as needed for mouth sores 1 Bottle 1     clobetasol (TEMOVATE) 0.05 % cream Apply topically 2 times daily 60 g 0     OnabotulinumtoxinA (BOTOX IJ) Inject 150 Units into the muscle once Lot: /C3 Exp: 05/2019       botulinum toxin type A (BOTOX) 100 UNITS injection Inject 200 Units into the muscle every 3 months 200 Units 3     ondansetron (ZOFRAN-ODT) 4 MG disintegrating tablet Take 1 tablet (4 mg) by mouth 3 times daily as needed for nausea 120 tablet 3     lidocaine (LIDODERM) 5 % patch Apply up to 3 patches to painful area  at once for up to 12 h within a 24 h period.  Remove after 12 hours. 30 patch 1     guanFACINE (TENEX) 1 MG tablet 3 tablets in the morning and 3 tablets in the evening 180 tablet 3     omeprazole (PRILOSEC) 40 MG capsule Take 1 capsule (40 mg) by mouth daily 30 capsule 9     linaclotide (LINZESS) 290 MCG capsule Take 1 capsule (290 mcg) by mouth every morning (before breakfast) 30 capsule 1     UNABLE TO FIND daily MEDICATION NAME: Peppermint supplement       hyoscyamine (ANASPAZ,LEVSIN) 0.125 MG tablet Take 1-2 tablets (125-250 mcg) by mouth every 4 hours as needed for cramping 40 tablet 1     Aspirin-Acetaminophen-Caffeine (EXCEDRIN MIGRAINE PO) Take by mouth as needed        hypromellose (GENTEAL) 0.3 % SOLN 1 drop every hour as needed       betamethasone valerate (VALISONE) 0.1 % cream Apply topically 2 times daily       carbamide peroxide (DEBROX) 6.5 % otic solution 5 drops 2 times daily       psyllium (METAMUCIL SMOOTH TEXTURE) 63 % POWD Take 3 teaspoonful by mouth 3 times daily Mix in 8 ounces of water 1 Bottle 11     polyethylene glycol (MIRALAX/GLYCOLAX) powder Take 17 g by mouth 2 times daily       Lactase (LACTAID PO) Take by mouth daily       multivitamin, therapeutic with minerals (MULTI-VITAMIN) TABS Take 1 tablet by mouth daily       Pyridoxine HCl (VITAMIN B6 PO)        dicyclomine (BENTYL) 20 MG tablet Take 1 tablet (20 mg) by mouth every 6 hours 90 tablet 1     lidocaine (XYLOCAINE) 2 % jelly Apply 1 Tube topically daily       triamcinolone (KENALOG) 0.1 % cream Apply sparingly to oral ulcers three times daily for 14 days as needed. 15 g 1     EPINEPHrine (EPIPEN) 0.3 MG/0.3ML injection Inject 0.3 mLs into the muscle once as needed for anaphylaxis for 1 dose. And call 911. 2 each 1     Ranitidine HCl (RANITIDINE 75 PO) Take  by mouth. As needed for GI upset       Dr.Prabhu Valdez Marquez MD.           Active Problem List:     Patient Active Problem List    Diagnosis Date Noted     Left knee  pain 10/20/2016     Priority: Medium     Patellofemoral instability 10/20/2016     Priority: Medium     Fibromyalgia 08/04/2016     Priority: Medium     Rheumatoid arthritis of multiple sites without rheumatoid factor (H) 08/04/2016     Priority: Medium     Raynaud's disease without gangrene 08/04/2016     Priority: Medium     Chronic abdominal pain 08/04/2016     Priority: Medium     Palpitations 01/12/2016     Priority: Medium     On colchicine therapy 10/30/2015     Priority: Medium     Spell of shaking 05/06/2015     Priority: Medium     Migraine 02/04/2015     Priority: Medium     Problem list name updated by automated process. 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 Symptoms well managed. Originally diagnosed at [facility] neurology. (Dr. Simpson)           IBS (irritable bowel syndrome) 05/18/2009     Priority: Medium     Seasonal allergic rhinitis 05/18/2009     Priority: Medium          History of Present Illness:     Chief Complaint   Patient presents with     RECHECK     2 month follow up for Rheumatoid arthritis of multiple sites without rheumatoid factor (H)     [place]. Marilyn [place] [facility] [facility] [place]:    Original presentation 2014:    Ms Orpoeza is a 19 y.o. female who presents with one year of presentation of painful oral ulcers (monthly) once that have worsened with two episodes in the last two months that presented with painful vulvar or vaginal ulcers (2 episodes so far every month) [not sure if they leave scar] as well that timing coincides  [place] [place]   ORAL ULCERS: last two episodes were severe, painful, white base with erythematous halo, that appear and disappear in the matter of 1-2 weeks without, does not bleed and doesn't respond to any treatment used (magic mouthwash), 10-15 in number with the biggest one being dime size.     VAGINAL ULCERS: 2-3 in number between labia majora and minora that occur simultaneously with oral ulcers, painful, non bleeding ulcers that are erythematous, no pus.     FOLLICULITIS: patient reports having spots in legs specially around ankle and knee, but arms, and neck are affected as well. Small lesions that resemble ingrown hair, most are red erythematous elevated lesions, well demarcated, some with liquid that patient refers as pimple like.     SKIN RASHES: welts and red macules with well defined borders that are pruritic and non-ulcerative on chest, arms and back. Benadryl relieves.     ARTHRALGIAS: In descending order of severity: hips, knees, wrists, shoulders, neck, lumbar, fingers.   C/o morning stiffness in hips, back and neck lasting for about until noon.     Ms. Oropeza reports they present in severe flares and never fully resolve, but do get better. She has seen some swelling and warmth on the affected joints but has not noticed and redness. Has tried Tylenol, ibuprofen, and hydrocodone with not much benefit.     FEVERS: Reports 3-4 sporadic episodes per month of self limited fevers of 38 C that occur during the evenings and associate facial flushing.     HEADACHES: occur daily and are bitemporal and irradiate occipitally, pulsatile in nature, intensity varies from intense to mild, are worsened with movement. On treatment with ibuprofen and somatriptan without any positive results.     VISION CHANGES: Patient reports that suddenly she would only see a gray blotch in her right eyes and would persist like this for a day and a half. Also reports blurriness in her left eye. Presence of pain and  burning sensation of eyeball with associated redness. Has changed prescription glasses in the matter of 2 years 3 times. She has had opthalmology exam and was not suggestive of any evidence of uveitis.   She was also evaluated in ED for a dizzy spell.     ABD PAIN W/ INTERMITTENT DIARRHEA AND CONSTIPATION: Patient reports abdominal pain that is intermittent, periods of 7 days without bowel movement and feeling bloated with change of pattern to diarrhea (liquidy without blood nor mucus, non foul smelling). Has taken Bentyl, Zegrid, and rinetidine with mild clinical improvement.  She also reports small red nodules after each needle stick for blood draw.   Neurology saw her back then and was not impressed with her presentation as physical examination was normal. Also MRI brain normal: Findings: No definite hemorrhage, mass affect, midline shift, or ventriculomegaly is noted.There are a few scattered non specific white matter T2 hyperintensities. Axial diffusion weighted images are unremarkable.     The major vascular structures appear patent. There is opacification and severe mucosal thickening in the right maxillary sinus. The remaining paranasal sinuses, and mastoid air cells are clear. Orbits are unremarkable  She has + HSV-1 IGG. Seen ID. Negative for Herpes simplex virus culture. HSV IGM I/II COMBINATION SENT TO [facility]  RESULTS = <0.91  REF RANGE: <0.91 = NEGATIVE  ID did not think HSV could explain her mouth and genital sores.     CRP within normal limits. HIV negative. CBC within normal limits; UA negative. Creatinine within normal limits   CYNDIE neg.  Antigliadin neg.   ANti TTG neg.   [facility] 2014: Colonoscopy and endoscopy neg; result not available. Not sure if biopsies were done.   Raynaud's phenomenon:  Onset: about 2013  Digits: all fingers  Digital ulcers: no  Color changes: white ---> purple and red  Duration of an attack: About couple of hours per mom  Pain during attack: not clear pain but bruise like  feeling  Frequency of attacks: few times a month  Family history of Raynauds: no  GERD: very long time    No hemoptysis.   No miscarriages/ no thrombosis in past.   No family or personal history of psoriasis, ulcerative colitis or chron's disease.   No buttock pain or low back pain or stiffness in AM    Interim history:  Dec 2014- Multiple mouth ulcers and did not eat for 5 days. This coincided with periods. Colchicine 0.6mg PO BID started in Nov 2014.   Prednisone taper in Dec 20mg to 0 in 4 weeks. She also used magic mouthwash. Kenalog cream. After going to the ER, 3 days later her ulcers were better. She thinks it improved after the menstruation stopped.   LMP 3 weeks ago.   COLCHICINE HAS HELPED A LOT REDUCING THE INTENSITY OF MENSTRUATION ASSOCIATED ORAL AND VULVAR ULCERS.     Interim history: 6/15    Since last visit only two episodes of mouth sores- small sized 6   No genital ulcers since last visit.   Colchicine 1.2 mg in AM and 0.6mg in PM keeps her symptoms under control.     Admitted in 5/15:  Admission Diagnoses:  - LUE weakness  - spell  - hx of migraines  - hx of Tourette syndrome  - hx of Behcet's disease    Discharge Diagnoses:   - spell likely 2/2 anxiety  - hx of migraines  - hx of Tourette syndrome  - hx of Behcet's disease    CT and MRI brain was normal.     Seeing Dr. Lilliana Miramontes soon as outpatient.     Dr. Garcia did not find any intraocular inflammation.      Interm 10/30/2015  ED for migraine. Continues to see neuro - MRI brain without lesions noted. Saw GI - upper barium normal. May be considering repeat colo. Worsening lesions in mouth and genitals. Has had continuous lesion in mouth x 2 months. 2 genital ulcers. Had fracture of right sesamoid in foot. Continues to have low grade fevers. New folliculitis on chest, legs and arms.     Interim hx: 1/11/2016    Pt states that since last visit she continues to have oral ulcers and has noted more folliculitis-like lesions on her lower  "extremities.  She states that on her right lower leg she had a red macular spot that has since resolved.  She denies uveitis.  She states that the colchicine initially helped but then stopped working.  She takes 0.6 mg TID.  She stopped taking her prednisone last week as she feels like this hasn't helped.  She hasn't had any episodes of vaginal ulcers.      She saw GI who stated she has gastroparesis.  She is seeing Cardiology later this week for possible syncopal episodes.      Interim history 5/16  Mouth ulcers X 1 last month  Genital ulcers - none since last visit.     Bilateral MCPs pain, knee pain and low back pain +ve increased since last visit  Morning stiffness X 2 hours (increasing)   5-6/10    \"overall myalgia\" has gotten worse    C/o pseudofolliculitis lesion on anterior shins bilaterally worse    Interim history: (7/18/2016)  Patient has just started Humira for 2 doses on 7/1 and 7/15 and reported minimal improvement of her hip pain but otherwise, did not notice anything different.     She reported painful mouth ulcer once a month since last visit but noticed that it has been getting deeper.   Denied any genital ulcers.    Still has ongoing bilateral MCPs pain, knee pain but this has been intermittent and she did not have any much pain today. She reported 2-3 hours morning stiffness of both hands and pain score of 6/10 at her knees and 8/10 of her hands but currently takes no pain medication except prn ativan which she takes when her muscle is really tight.     The only concern is that she gained weight even though she has not been eating much (eat once a day) and do exercises every day for 1 hour (with video of yoga, pilates). Her mother wonders if she needs to have some labs work up include sex hormone, thyroid, cortisol.    Interval history 9/14/16  Patient was started Humira at the last visit, patient reports worsening of skin ulcers since starting Humira and stopped taking Humura for the past 2 " weeks. Patient reports oral and genital ulcers has been stable even before starting Humira and has not had any interval oral/genital ulcers. However she reports recurrent skin ulcers occurring on a daily basis that affects her chest and anterior legs. Patient also has stopped taking prednisone, she reports that she doesn't feel the prednisone helps, her last dose of prednisone was 6+ months ago. Patient also report worsening low back pain that sometimes causes her to limp.    In the interval patient also visited ED on 8/31/16 for left hand pain and what the patient describes as phlebitis, no medication or procedure was done and the patient was discharged with a splint. Patient also reported 1 episode of chest pressure that was associated with dyspnea and numbness of the left arm, she was shopping in a supermarket at the time of onset, and the pressure resolved after she took a nap.    Patient denies any infectious symptoms in the interval, no fever, chills, night sweat, cough, dysuria, denies eye pain or visual changes.    November 4, 2016  Oct - had one genital ulcer  Oral ulcers X 5- around menstruation time.   Knee pain- chronic on the left side  Dizziness spells - on and off; been to the ER for that in the past.   On and off migraines  July 2013 - s/p MPFL reconstruction plus LRL 7/2013  Morning stiffness in knee (left) X 1 hour    Severe jaw pain and chest pain- patient wondering if this is Tourette's or esophageal spasms. Cardiac workup negative.   Fever     January 13, 2017  [place] [facility] [place] syncope from dehydration.   Chest pain on and off still continues. Painful with deep breaths.   Oral ulcers - few minor ones lasting for one week;   One major one in roof of mouth lasting for about one week.   Knee pain +ve - reviewed the recent MRI with her orthopedic surgeon.   On and off migraines  otezla is approved. Still waiting to receive the meds.     BP Readings from Last 3 Encounters:    01/13/17 102/70   01/11/17 108/69   01/05/17 112/64     Complete ROS negative except for above         Past Medical History:     Past Medical History   Diagnosis Date     Cervical adenitis May 2010     Constipation, chronic 1994     Behcet's disease (H)      Gastro-oesophageal reflux disease      Anxiety      Tourette's      Chronic abdominal pain      Migraines      Syncope      Palpitations      Gastroparesis      Past Surgical History   Procedure Laterality Date     Dental surgery  1996     Teeth removal     Endoscopy upper, colonoscopy, combined  2005     Arthroscopy knee with patellar realignment  7/25/2013     Procedure: ARTHROSCOPY KNEE WITH PATELLAR REALIGNMENT;  Left Knee Arthroscopy, Medial Patellofemoral Ligament Reconstruction with Allograft  ;  Surgeon: Jennifer Acevedo MD;  Location: [facility]          Social History:     Social History     Occupational History     Not on file.     Social History Main Topics     Smoking status: Never Smoker      Smokeless tobacco: Never Used     Alcohol Use: No     Drug Use: No     Sexual Activity: No          Family History:     Family History   Problem Relation Age of Onset     Depression Mother      Neurologic Disorder Mother      Migraines, take imitrex injection.  Also in maternal grandmother.       Alcohol/Drug Father      Hypertension Father      Depression Father      Cardiovascular Maternal Grandmother      Depression Maternal Grandmother      Hypertension Maternal Grandmother      Alzheimer Disease Maternal Grandmother      Cardiovascular Maternal Grandfather      Hypertension Maternal Grandfather      Depression Maternal Grandfather      Alcohol/Drug Maternal Grandfather      Cardiovascular Paternal Grandmother      Hypertension Paternal Grandmother      Cardiovascular Paternal Grandfather      Hypertension Paternal Grandfather      Glaucoma No family hx of      Macular Degeneration No family hx of           Allergies:     Allergies   Allergen Reactions      Amoxil [Penicillins] Rash     Dad unsure of reaction.     Contrast Dye Rash     Contrast Media Ready-Box [facility] [place] 04/09/2014.; Contrast Media Ready-[facility] [place] 04/09/2014.  NOTE: this is a contrast media oral with iodine. Premedicate with methylpred standard for IV contrast, request barium contrast for oral contrast.     Kiwi Swelling     Orange Fruit [Citrus] Anaphylaxis     Pineapple Anaphylaxis, Difficulty breathing and Rash     Milk Protein Extract Hives     Reglan [Metoclopramide] Other (See Comments)     IV dose only, in ER, rapid heart rate.     Ace Inhibitors      Difficulty in breathing and GI upset     Amoxicillin-Pot Clavulanate      Latex      Midazolam Unknown     parent states that when pt takes this medication, she wakes up being very violent .     Versed      Coming out of pelvic exam at age of 5, was kicking and screaming when coming out of the versed.     Adhesive Tape Rash     Azithromycin Hives and Rash     Cephalexin Itching and Rash     Itchy mouth     Keflex [Cephalexin-Fd&C Yellow #6] Hives          Medications:     Current Outpatient Prescriptions   Medication Sig Dispense Refill     Colchicine 0.6 MG CAPS Take 0.6 mg by mouth See Admin Instructions 2 capsules in AM and 1 capsule in PM 90 capsule 3     albuterol (PROAIR HFA/PROVENTIL HFA/VENTOLIN HFA) 108 (90 BASE) MCG/ACT Inhaler Inhale 2 puffs into the lungs every 6 hours as needed for shortness of breath / dyspnea or wheezing 1 Inhaler 1     dicyclomine (BENTYL) 10 MG capsule Take 1-2 capsules (10-20 mg) by mouth 2 times daily as needed 120 capsule 3     amitriptyline (ELAVIL) 25 MG tablet Take 1 tablet (25 mg) by mouth At Bedtime May increase to 2, if no benefit after 2 weeks.  Feel free to call / page the nurse for Dr. Mcmahon at [phone] / [phone] with questions regarding this order. 45 tablet 3     lubiprostone (AMITIZA) 24 MCG capsule Take 1 capsule (24 mcg) by mouth 2 times daily (with meals) 30 capsule 3      OnabotulinumtoxinA (BOTOX IJ) Inject 150 Units into the muscle Lot # /C3  Exp: 8/2019       norgestimate-ethinyl estradiol (ORTHO-CYCLEN, SPRINTEC) 0.25-35 MG-MCG per tablet Take 1 tablet by mouth daily 84 tablet 3     SUMAtriptan (IMITREX) 100 MG tablet Take 1 tablet (100 mg) by mouth at onset of headache for migraine May repeat in 2 hours. Max 2 tablets/24 hours. 18 tablet 3     promethazine (PHENERGAN) 25 MG tablet Take 0.5-1 tablets (12.5-25 mg) by mouth every 6 hours as needed for nausea 20 tablet 1     sucralfate (CARAFATE) 1 GM/10ML suspension Take 10 mLs (1 g) by mouth 4 times daily 1200 mL 2     metoclopramide (REGLAN) 5 MG tablet Take 1 tablet (5 mg) by mouth 2 times daily as needed 60 tablet 2     meclizine (ANTIVERT) 25 MG tablet Take 1 tablet (25 mg) by mouth every 6 hours as needed for dizziness 30 tablet 1     LORazepam (ATIVAN) 1 MG tablet Take 1-2 tablets (1-2 mg) by mouth every 8 hours as needed for other (abdominal pain) Do not operate a vehicle after taking this medication 75 tablet 0     apremilast (OTEZLA) 30 MG tablet Take 1 tablet (30 mg) by mouth 2 times daily 60 tablet 3     Apremilast (OTEZLA) 10 & 20 & 30 MG TBPK Take one tablet in the morning on day 1, then take one tablet every 12 hours on days 2 thru 14, according to the instructions on the packet. 27 tablet 0     Magic Mouthwash (FV std formula) lidocaine visc 2% 2.5mL/5mL & maalox/mylanta w/ simeth 2.5mL/5mL & diphenhydrAMINE 5mg/5mL Swish and swallow 10 mLs in mouth every 6 hours as needed for mouth sores 1 Bottle 1     clobetasol (TEMOVATE) 0.05 % cream Apply topically 2 times daily 60 g 0     OnabotulinumtoxinA (BOTOX IJ) Inject 150 Units into the muscle once Lot: /C3 Exp: 05/2019       botulinum toxin type A (BOTOX) 100 UNITS injection Inject 200 Units into the muscle every 3 months 200 Units 3     ondansetron (ZOFRAN-ODT) 4 MG disintegrating tablet Take 1 tablet (4 mg) by mouth 3 times daily as needed for nausea 120  tablet 3     lidocaine (LIDODERM) 5 % patch Apply up to 3 patches to painful area at once for up to 12 h within a 24 h period.  Remove after 12 hours. 30 patch 1     guanFACINE (TENEX) 1 MG tablet 3 tablets in the morning and 3 tablets in the evening 180 tablet 3     omeprazole (PRILOSEC) 40 MG capsule Take 1 capsule (40 mg) by mouth daily 30 capsule 9     linaclotide (LINZESS) 290 MCG capsule Take 1 capsule (290 mcg) by mouth every morning (before breakfast) 30 capsule 1     UNABLE TO FIND daily MEDICATION NAME: Peppermint supplement       hyoscyamine (ANASPAZ,LEVSIN) 0.125 MG tablet Take 1-2 tablets (125-250 mcg) by mouth every 4 hours as needed for cramping 40 tablet 1     Aspirin-Acetaminophen-Caffeine (EXCEDRIN MIGRAINE PO) Take by mouth as needed        hypromellose (GENTEAL) 0.3 % SOLN 1 drop every hour as needed       betamethasone valerate (VALISONE) 0.1 % cream Apply topically 2 times daily       carbamide peroxide (DEBROX) 6.5 % otic solution 5 drops 2 times daily       psyllium (METAMUCIL SMOOTH TEXTURE) 63 % POWD Take 3 teaspoonful by mouth 3 times daily Mix in 8 ounces of water 1 Bottle 11     polyethylene glycol (MIRALAX/GLYCOLAX) powder Take 17 g by mouth 2 times daily       Lactase (LACTAID PO) Take by mouth daily       multivitamin, therapeutic with minerals (MULTI-VITAMIN) TABS Take 1 tablet by mouth daily       Pyridoxine HCl (VITAMIN B6 PO)        dicyclomine (BENTYL) 20 MG tablet Take 1 tablet (20 mg) by mouth every 6 hours 90 tablet 1     lidocaine (XYLOCAINE) 2 % jelly Apply 1 Tube topically daily       triamcinolone (KENALOG) 0.1 % cream Apply sparingly to oral ulcers three times daily for 14 days as needed. 15 g 1     EPINEPHrine (EPIPEN) 0.3 MG/0.3ML injection Inject 0.3 mLs into the muscle once as needed for anaphylaxis for 1 dose. And call 911. 2 each 1     Ranitidine HCl (RANITIDINE 75 PO) Take  by mouth. As needed for GI upset            Physical Exam:   Blood pressure 102/70, pulse  "93, height 1.6 m (5' 3\"), weight 69.4 kg (153 lb), SpO2 99 %, not currently breastfeeding.  Wt Readings from Last 4 Encounters:   01/13/17 69.4 kg (153 lb)   01/11/17 69.582 kg (153 lb 6.4 oz)   01/05/17 68.493 kg (151 lb)   12/06/16 71.759 kg (158 lb 3.2 oz)     Constitutional: well-developed, appearing stated age; cooperative  Eyes: nl EOM, PERRLA, vision, conjunctiva, sclera  ENT: No oral ulcer seen.   nl external ears, nose, hearing, lips, teeth, gums, throat  No mucous membrane lesions, normal saliva pool  Neck: no mass or thyroid enlargement  Resp: lungs clear to auscultation,   CV: RRR, no murmurs, rubs or gallops, no edema  GI: no ABD mass or tenderness, no HSM  : not tested  Lymph: no cervical, supraclavicular or epitrochlear nodes  MS: Left knee tenderness to full ROM. However ROM full.   All TMJ, neck, shoulder, elbow, wrist, MCP/PIP/DIP, spine, hip, ankle, and foot MTP/IP joints were examined and  found normal except tenderness on even light touch of both knees with minimal swelling but no warmth. No active synovitis or deformity. Full ROM.  Normal  strength. Fist 100%.  No dactylitis,  tenosynovitis, enthesopathy.  Skin: no nail pitting, alopecia, rash  Psych: nl judgement, orientation, memory, affect.         Data:     "

## 2017-01-13 NOTE — NURSING NOTE
"Chief Complaint   Patient presents with     RECHECK     2 month follow up for Rheumatoid arthritis of multiple sites without rheumatoid factor (H)       Initial /70 mmHg  Pulse 93  Ht 1.6 m (5' 3\")  Wt 69.4 kg (153 lb)  BMI 27.11 kg/m2  SpO2 99% Estimated body mass index is 27.11 kg/(m^2) as calculated from the following:    Height as of this encounter: 1.6 m (5' 3\").    Weight as of this encounter: 69.4 kg (153 lb).  BP completed using cuff size: regular  Farzaneh Armas CMA    "

## 2017-01-16 LAB — DEPRECATED CALCIDIOL+CALCIFEROL SERPL-MC: 28 UG/L (ref 20–75)

## 2017-01-16 RX ORDER — LUBIPROSTONE 24 UG/1
24 CAPSULE ORAL 2 TIMES DAILY WITH MEALS
Qty: 60 CAPSULE | Refills: 3 | Status: SHIPPED | OUTPATIENT
Start: 2017-01-16 | End: 2017-04-21

## 2017-01-16 NOTE — PROGRESS NOTES
Quick Note:    Results released to [employer]:  This is just brief comments on your labs. If you have questions regarding these results, please do not hesitate to ask me during the next clinic visit.   Basic blood cell counts, liver and kidney function labs within normal limits    Sincerely    Austen Marquez MD  ______

## 2017-01-16 NOTE — PROGRESS NOTES
Quick Note:    Results released to [employer]:  This is just brief comments on your labs. If you have questions regarding these results, please do not hesitate to ask me during the next clinic visit.   Vitamin D level is normal. Good.   Sincerely    Austen Marquez MD  ______

## 2017-01-16 NOTE — TELEPHONE ENCOUNTER
Called. Trial of lubiprostone (Amitiza). If successful, needs increased quant. Re-sent prescription.

## 2017-01-17 ENCOUNTER — OFFICE VISIT (OUTPATIENT)
Dept: FAMILY MEDICINE | Facility: CLINIC | Age: 22
End: 2017-01-17
Payer: COMMERCIAL

## 2017-01-17 ENCOUNTER — THERAPY VISIT (OUTPATIENT)
Dept: PHYSICAL THERAPY | Facility: CLINIC | Age: 22
End: 2017-01-17
Payer: COMMERCIAL

## 2017-01-17 VITALS
BODY MASS INDEX: 27.11 KG/M2 | HEART RATE: 104 BPM | OXYGEN SATURATION: 96 % | SYSTOLIC BLOOD PRESSURE: 115 MMHG | DIASTOLIC BLOOD PRESSURE: 81 MMHG | WEIGHT: 153 LBS

## 2017-01-17 DIAGNOSIS — F43.10 PTSD (POST-TRAUMATIC STRESS DISORDER): ICD-10-CM

## 2017-01-17 DIAGNOSIS — R10.9 CHRONIC ABDOMINAL PAIN: Primary | ICD-10-CM

## 2017-01-17 DIAGNOSIS — M25.562 LEFT KNEE PAIN: Primary | ICD-10-CM

## 2017-01-17 DIAGNOSIS — Z30.09 GENERAL COUNSELING FOR PRESCRIPTION OF ORAL CONTRACEPTIVES: ICD-10-CM

## 2017-01-17 DIAGNOSIS — M22.10: ICD-10-CM

## 2017-01-17 DIAGNOSIS — G89.29 CHRONIC ABDOMINAL PAIN: Primary | ICD-10-CM

## 2017-01-17 PROCEDURE — 99214 OFFICE O/P EST MOD 30 MIN: CPT | Performed by: NURSE PRACTITIONER

## 2017-01-17 PROCEDURE — 97112 NEUROMUSCULAR REEDUCATION: CPT | Mod: GP | Performed by: PHYSICAL THERAPY ASSISTANT

## 2017-01-17 PROCEDURE — 97110 THERAPEUTIC EXERCISES: CPT | Mod: GP | Performed by: PHYSICAL THERAPY ASSISTANT

## 2017-01-17 PROCEDURE — 97530 THERAPEUTIC ACTIVITIES: CPT | Mod: GP | Performed by: PHYSICAL THERAPY ASSISTANT

## 2017-01-17 RX ORDER — LORAZEPAM 1 MG/1
1-2 TABLET ORAL EVERY 8 HOURS PRN
Qty: 90 TABLET | Refills: 0 | Status: SHIPPED | OUTPATIENT
Start: 2017-01-17 | End: 2017-04-24

## 2017-01-17 ASSESSMENT — PATIENT HEALTH QUESTIONNAIRE - PHQ9: 5. POOR APPETITE OR OVEREATING: MORE THAN HALF THE DAYS

## 2017-01-17 ASSESSMENT — ANXIETY QUESTIONNAIRES
1. FEELING NERVOUS, ANXIOUS, OR ON EDGE: SEVERAL DAYS
6. BECOMING EASILY ANNOYED OR IRRITABLE: SEVERAL DAYS
5. BEING SO RESTLESS THAT IT IS HARD TO SIT STILL: SEVERAL DAYS
2. NOT BEING ABLE TO STOP OR CONTROL WORRYING: SEVERAL DAYS
3. WORRYING TOO MUCH ABOUT DIFFERENT THINGS: SEVERAL DAYS
7. FEELING AFRAID AS IF SOMETHING AWFUL MIGHT HAPPEN: NOT AT ALL
GAD7 TOTAL SCORE: 7

## 2017-01-17 NOTE — MR AVS SNAPSHOT
After Visit Summary   1/17/2017    Samara Oropeza    MRN: [id number]           Patient Information     Date Of Birth          1995        Visit Information        Provider Department      1/17/2017 11:30 AM Sonja Abreu APRN [facility]        Today's Diagnoses     Chronic abdominal pain    -  1        Follow-ups after your visit        Additional Services     MENTAL HEALTH REFERRAL       Your provider has referred you to: [facility]: [facility] - Counseling (Individual/Couples/Family) - [facility] [phone]   [email or web address]   *Patient will be contacted by [facility] scheduling partner, [facility] to schedule an appointment.  Patients may also call [facility] to schedule.    Please refer to Radhika Hilario if available, or Maria D reid    All scheduling is subject to the client's specific insurance plan & benefits, provider/location availability, and provider clinical specialities.  Please arrive 15 minutes early for your first appointment and bring your completed paperwork.    Please be aware that coverage of these services is subject to the terms and limitations of your health insurance plan.  Call member services at your health plan with any benefit or coverage questions.                  Your next 10 appointments already scheduled     Jan 24, 2017 10:10 AM   LUIZ Extremity with SAUL Mcelroy [facility] Physical Therapy LUIZ ([facility])    [address] [place] [id number] [phone]            Jan 31, 2017 10:00 AM   LUIZ Extremity with TAINA Bustillo [facility] Physical Therapy LUIZ ([facility])    [address] [place] [address] [id number] [phone]            Feb 17, 2017  1:30 PM   (Arrive by 1:15 PM)   Return Visit with Kacie Almeida,  APRN CNP   Gastroenterology and IBD ([facility])    [address]  4th Floor  [facility] [id number] [phone]            Mar 27, 2017  2:20 PM   (Arrive by 2:05 PM)   Return Botox with Frederick Bender MD   [facility] Physical Medicine and Rehabilitation ([facility])    [address]  3rd Floor  [facility] [id number] [phone]            Mar 31, 2017  2:00 PM   (Arrive by 1:45 PM)   Return Visit with Austen Marquez MD   [facility] Rheumatology ([facility])    [address]  3rd [facility] [id number] [phone]              Who to contact     If you have questions or need follow up information about today's clinic visit or your schedule please contact [facility] directly at [phone].  Normal or non-critical lab and imaging results will be communicated to you by [employer], letter or phone within 4 business days after the clinic has received the results. If you do not hear from us within 7 days, please contact the clinic through [employer] or phone. If you have a critical or abnormal lab result, we will notify you by phone as soon as possible.  Submit refill requests through [employer] or call your pharmacy and they will forward the refill request to us. Please allow 3 business days for your refill to be completed.          Additional Information About Your Visit        [employer] Information     [employer] gives you secure access to your electronic health record. If you see a primary care provider, you can also send messages to your care team and make appointments. If you have questions, please call your primary care clinic.  If you do not have a primary care provider, please call [phone] and they will assist you.        Care EveryWhere ID     This is your Care EveryWhere ID. This could be used by other organizations to access your [facility]  records  [id number]         Blood Pressure from Last 3 Encounters:   01/13/17 102/70   01/11/17 108/69   01/05/17 112/64    Weight from Last 3 Encounters:   01/13/17 153 lb (69.4 kg)   01/11/17 153 lb 6.4 oz (69.582 kg)   01/05/17 151 lb (68.493 kg)              We Performed the Following     MENTAL HEALTH REFERRAL          Where to get your medicines      Some of these will need a paper prescription and others can be bought over the counter.  Ask your nurse if you have questions.     Bring a paper prescription for each of these medications    - LORazepam 1 MG tablet       Primary Care Provider Office Phone # Fax #    EVI Cardona [phone]       [facility] [address] [facility] [address]  [facility] [address]        Thank you!     Thank you for choosing [facility]  for your care. Our goal is always to provide you with excellent care. Hearing back from our patients is one way we can continue to improve our services. Please take a few minutes to complete the written survey that you may receive in the mail after your visit with us. Thank you!             Your Updated Medication List - Protect others around you: Learn how to safely use, store and throw away your medicines at www.disposemymeds.org.          This list is accurate as of: 1/17/17 11:49 AM.  Always use your most recent med list.                   Brand Name Dispense Instructions for use    albuterol 108 (90 BASE) MCG/ACT Inhaler    PROAIR HFA/PROVENTIL HFA/VENTOLIN HFA    1 Inhaler    Inhale 2 puffs into the lungs every 6 hours as needed for shortness of breath / dyspnea or wheezing       amitriptyline 25 MG tablet    ELAVIL    45 tablet    Take 1 tablet (25 mg) by mouth At Bedtime May increase to 2, if no benefit after 2 weeks.  Feel free to call / page the nurse for Dr. Mcmahon at [phone] / [phone] with questions regarding this order.       * apremilast 30 MG tablet    OTEZLA    60 tablet     Take 1 tablet (30 mg) by mouth 2 times daily       * Apremilast 10 & 20 & 30 MG Tbpk    OTEZLA    27 tablet    Take one tablet in the morning on day 1, then take one tablet every 12 hours on days 2 thru 14, according to the instructions on the packet.       betamethasone valerate 0.1 % cream    VALISONE     Apply topically 2 times daily       * botulinum toxin type A 100 UNITS injection    BOTOX    200 Units    Inject 200 Units into the muscle every 3 months       * BOTOX IJ      Inject 150 Units into the muscle once Lot: /C3 Exp: 05/2019       * BOTOX IJ      Inject 150 Units into the muscle Lot # /C3  Exp: 8/2019       carbamide peroxide 6.5 % otic solution    DEBROX     5 drops 2 times daily       clobetasol 0.05 % cream    TEMOVATE    60 g    Apply topically 2 times daily       Colchicine 0.6 MG Caps     90 capsule    Take 0.6 mg by mouth See Admin Instructions 2 capsules in AM and 1 capsule in PM       * dicyclomine 20 MG tablet    BENTYL    90 tablet    Take 1 tablet (20 mg) by mouth every 6 hours       * dicyclomine 10 MG capsule    BENTYL    120 capsule    Take 1-2 capsules (10-20 mg) by mouth 2 times daily as needed       EPINEPHrine 0.3 MG/0.3ML injection    EPIPEN    2 each    Inject 0.3 mLs into the muscle once as needed for anaphylaxis for 1 dose. And call 911.       EXCEDRIN MIGRAINE PO      Take by mouth as needed       guanFACINE 1 MG tablet    TENEX    180 tablet    3 tablets in the morning and 3 tablets in the evening       hyoscyamine 0.125 MG tablet    ANASPAZ/LEVSIN    40 tablet    Take 1-2 tablets (125-250 mcg) by mouth every 4 hours as needed for cramping       hypromellose 0.3 % Soln ophthalmic solution    GENTEAL     1 drop every hour as needed       LACTAID PO      Take by mouth daily       lidocaine 2 % topical gel    XYLOCAINE     Apply 1 Tube topically daily       lidocaine 5 % Patch    LIDODERM    30 patch    Apply up to 3 patches to painful area at once for up to 12 h within  a 24 h period.  Remove after 12 hours.       lidocaine visc 2% 2.5mL/5mL & maalox/mylanta w/ simeth 2.5mL/5mL & diphenhydrAMINE 5mg/5mL Susp suspension    MAGIC Mouthwash    1 Bottle    Swish and swallow 10 mLs in mouth every 6 hours as needed for mouth sores       linaclotide 290 MCG capsule    LINZESS    30 capsule    Take 1 capsule (290 mcg) by mouth every morning (before breakfast)       LORazepam 1 MG tablet    ATIVAN    90 tablet    Take 1-2 tablets (1-2 mg) by mouth every 8 hours as needed for other (abdominal pain) Do not operate a vehicle after taking this medication       lubiprostone 24 MCG capsule    AMITIZA    60 capsule    Take 1 capsule (24 mcg) by mouth 2 times daily (with meals)       meclizine 25 MG tablet    ANTIVERT    30 tablet    Take 1 tablet (25 mg) by mouth every 6 hours as needed for dizziness       metoclopramide 5 MG tablet    REGLAN    60 tablet    Take 1 tablet (5 mg) by mouth 2 times daily as needed       Multi-vitamin Tabs tablet      Take 1 tablet by mouth daily       norgestimate-ethinyl estradiol 0.25-35 MG-MCG per tablet    ORTHO-CYCLEN, SPRINTEC    84 tablet    Take 1 tablet by mouth daily       omeprazole 40 MG capsule    priLOSEC    30 capsule    Take 1 capsule (40 mg) by mouth daily       ondansetron 4 MG ODT tab    ZOFRAN-ODT    120 tablet    Take 1 tablet (4 mg) by mouth 3 times daily as needed for nausea       polyethylene glycol powder    MIRALAX/GLYCOLAX     Take 17 g by mouth 2 times daily       promethazine 25 MG tablet    PHENERGAN    20 tablet    Take 0.5-1 tablets (12.5-25 mg) by mouth every 6 hours as needed for nausea       psyllium 63 % Powd    METAMUCIL SMOOTH TEXTURE    1 Bottle    Take 3 teaspoonful by mouth 3 times daily Mix in 8 ounces of water       RANITIDINE 75 PO      Take  by mouth. As needed for GI upset       sucralfate 1 GM/10ML suspension    CARAFATE    1200 mL    Take 10 mLs (1 g) by mouth 4 times daily       SUMAtriptan 100 MG tablet    IMITREX     18 tablet    Take 1 tablet (100 mg) by mouth at onset of headache for migraine May repeat in 2 hours. Max 2 tablets/24 hours.       triamcinolone 0.1 % cream    KENALOG    15 g    Apply sparingly to oral ulcers three times daily for 14 days as needed.       UNABLE TO FIND      daily MEDICATION NAME: Peppermint supplement       VITAMIN B6 PO          * Notice:  This list has 7 medication(s) that are the same as other medications prescribed for you. Read the directions carefully, and ask your doctor or other care provider to review them with you.
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
## 2017-01-18 RX ORDER — NORGESTIMATE AND ETHINYL ESTRADIOL 0.25-0.035
1 KIT ORAL DAILY
Qty: 84 TABLET | Refills: 3 | Status: SHIPPED | OUTPATIENT
Start: 2017-01-18 | End: 2017-12-10

## 2017-01-18 ASSESSMENT — ANXIETY QUESTIONNAIRES: GAD7 TOTAL SCORE: 7

## 2017-01-18 NOTE — PROGRESS NOTES
CHIEF COMPLAINT:  Diarrhea and cramping abdominal pain.      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 Marquez who has been seen by [facility], Dr. Burks, Dr. Baker and recently again by myself 12/06/2016 for diarrhea, chest pain, gastroparesis.  A trial of very low dose metoclopramide twice daily was given.  She was on Bactrim at the time of sinusitis which might have treated possible small intestinal bacterial overgrowth.  She was given a trial of sucralfate.  The metoclopramide, if used was then to be stopped except for acute benefit, as there was potential to worsen her Tourette's with it.        Samara returns now with her father and with a note form her mother.  Her mother's concerns are that she is losing weight, having a lot of bloating after even small amounts of food with hardening of the abdomen, still having dizzy spells and blackouts, wondering what this is related to low blood sugar.  Mother reports Samara still has frequent chest pain causing her not to be able to breathe.  Albuterol inhaler helped some.  Samara is only able to eat 1 meal a day, has diarrhea, severe stomach pain and mother reports that Samara had no benefit from Medrol dose.      Samara reports having taken only 1 single dose of the Medrol, as she does not like taking steroids.  The Bactrim she received for sinusitis did decrease her diarrhea and GI symptoms.  She is now off MiraLax and using Linzess daily.  She is having a bowel movement about every other day, and I asked whether that is enough -- remains unanswered.      Even after Bactrim, Samara still has some diarrhea, now 1-2 times a day and watery and urgent.  Chest pain may occur 8-10 times in a day generally within two 1-hour spells with each pain episode lasting perhaps 5 minutes.  When she drinks cold water, it feels better in the chest.  Her nausea is slightly decreased with the  use of Carafate which she has liquid 4 times daily.  She is not having burping or belching.  She believes she had increase in headache and insomnia with Amitiza.  She did have increase in blackouts with Topamax and was no worse off of it.  The blackouts were occurring especially September and October, and Rheumatology thinks it is part of her rheumatologic disease.  The Medrol was given after a fall injury for pain of the fall -- which she took 1 dose of.  She comments on poor sleep regardless.      Samara confirms her mother's report of the chest pain and nausea.  She confirms that she will have bloat with a hard abdomen after ingestion of almost anything and has not seen anything that she is able to eat.  Previously, she had lorazepam with slight benefit for the chest pain, and it continues to help some for her abdomen.  She received albuterol from her boyfriend and that helps with her breathing tightness.      MEDICAL HISTORY, MEDICATIONS, ADVERSE DRUG REACTIONS:  Reviewed.      REVIEW OF SYSTEMS:  Primarily pertinent as above.  Questionnaire reviewed.  She has sinus pain, difficulty swallowing.  No sore throat but mouth sores.  Gum pain and bleeding gums, dry mouth.  The chest pain occurs both on exertion and at rest and awakens her in the night.  She awakens in the night also, with shortness of breath.  She has heartburn, nausea, not specifically with her chest pain, she thinks.  She has abdominal pain, bloat, constipation, diarrhea.  Other ailments are noted in her questionnaire with multiple positive systems.      OBJECTIVE:   VITAL SIGNS:  Reviewed.  Weight 153/69.58 kg.  Height 63/1.6 meters.  BMI 27.  The recorded weights November and 12/06 158 pounds with a BMI 28.   GENERAL:  Clean, pleasant, well-nourished woman who looks tired.  She is here with her father, Kishor.   EYES:  Clear.   NEUROLOGIC:  Speech is fluent and logical.   RESPIRATORY:  Breathing is calm and grossly normal.   ABDOMEN:  Mildly  tender, left greater than right, and also epigastrium.  Abdominal tenderness is decreased with abdominal muscle tension.      PAST RESULTS:  Include upper GI endoscopy 06/2014 and also 2005 done for dysphagia, abdominal pain without successful treatment.  On both times with visually normal and with normal biopsies.  In 06/2014, there was also colonoscopy with normal biopsies.      Note also Samara is not sure she received trial of the metoclopramide.  She has not experienced worsening of her torticollis recently but has continued to undergo visits and treatments including botulism toxin for excessive tightness at the left shoulder, most recently, 01/05/2017.        ASSESSMENT:  A woman with considerable gastrointestinal distress, some of which clearly falls within the criteria for irritable bowel syndrome, with significant cramping bloating.  Without an agent for constipation, however, she will become constipated. Her chest pain is not clarified regarding whether it is related to acid reflux or not.  Whether she has genuine benefit or some placebo benefit with mild help from her boyfriend's albuterol is not clear.  It is less likely to be secondary to esophageal spasm if she feels better with cold liquid as cold liquid more often triggers it.      PLAN:   1.  Discontinue any metoclopramide.   2.  Restart amitriptyline now 25 mg at bedtime, may increase to 2 if no benefit 2 weeks.  Note that she was on a much higher dose previously.   3.  Trial of dicyclomine 10 or 20 mg 2 times daily as needed.  Call if she always needs a higher dose. I described benefits and side effects.   4.  Temporary prescription for her to receive albuterol.   5.  Refer to Pulmonary Medicine for atypical chest pain.   6.  Two years ago ordered upper GI endoscopy with monitored anesthesia care, not of current concern as she has no immediate symptoms with swallowing and scopes have been negative for similar or worse symptoms before.  Also,  previously ordered have been esophageal motility and pH studies; these remain recommended.  She did not believe she would be able to tolerate these days but understands the benefit she may have from the results and that she may be able to focus and accomplished doing the studies.   7.  An alternate treatment for constipation, trial of lubiprostone.  The linaclotide was better than previous management, but she may do better with the lubiprostone and will decide between them.     8.  The use and adverse effects of the Reglan were discussed, and she may discuss that further with her mother.  She did have adverse effect when she received it in ER at one point.   9.  Practice diaphragmatic breathing and use it regularly.   10.  Regarding her nightmares, try to shape them.   11.  Psyllium may help from stool and give additional benefit over time.   12.  Probiotics were discussed but are not strongly recommended.   13.  Allergy syndrome interactions were discussed and presented that she may review these at home and consider whether she has additional food reactions.   14.  Practice Kegel exercises and relax those muscles for defecation..   15.  At this time, leave the February appointment on books and reschedule if no results will be available at that time.      We discussed the assessment and plan.     Total visit 35 minutes with counseling time 20 minutes.          EVI DE LA FUENTE CNP             D: 2017 13:03   T: 2017 09:14   MT: LEIA      Name:     LUCINA BAH   MRN:      [id number]        Account:      [id number]   :      1995           Service Date: 2017      Document: [id number]

## 2017-01-24 LAB
DLCOUNC-%PRED-PRE: 89 %
DLCOUNC-PRE: 22.71 ML/MIN/MMHG
DLCOUNC-PRED: 25.4 ML/MIN/MMHG
ERV-%PRED-PRE: 75 %
ERV-PRE: 0.86 L
ERV-PRED: 1.15 L
EXPTIME-PRE: 5.38 SEC
FEF2575-%PRED-PRE: 89 %
FEF2575-PRE: 3.22 L/SEC
FEF2575-PRED: 3.6 L/SEC
FEFMAX-%PRED-PRE: 84 %
FEFMAX-PRE: 5.66 L/SEC
FEFMAX-PRED: 6.73 L/SEC
FEV1-%PRED-PRE: 101 %
FEV1-PRE: 3.02 L
FEV1FEV6-PRE: 84 %
FEV1FEV6-PRED: 87 %
FEV1FVC-PRE: 84 %
FEV1FVC-PRED: 89 %
FEV1SVC-PRE: 90 %
FEV1SVC-PRED: 76 %
FIFMAX-PRE: 3.61 L/SEC
FVC-%PRED-PRE: 106 %
FVC-PRE: 3.61 L
FVC-PRED: 3.39 L
IC-%PRED-PRE: 89 %
IC-PRE: 2.5 L
IC-PRED: 2.79 L
VA-%PRED-PRE: 84 %
VA-PRE: 4.12 L
VC-%PRED-PRE: 85 %
VC-PRE: 3.36 L
VC-PRED: 3.94 L

## 2017-01-25 NOTE — PROGRESS NOTES
Answers for HPI/ROS submitted by the patient on 1/11/2017   General Symptoms: Yes  Skin Symptoms: Yes  HENT Symptoms: Yes  EYE SYMPTOMS: Yes  HEART SYMPTOMS: Yes  LUNG SYMPTOMS: Yes  INTESTINAL SYMPTOMS: Yes  URINARY SYMPTOMS: No  GYNECOLOGIC SYMPTOMS: No  BREAST SYMPTOMS: No  SKELETAL SYMPTOMS: Yes  BLOOD SYMPTOMS: Yes  NERVOUS SYSTEM SYMPTOMS: Yes  MENTAL HEALTH SYMPTOMS: Yes  Fever: No  Loss of appetite: Yes  Weight loss: Yes  Weight gain: No  Fatigue: Yes  Night sweats: Yes  Chills: Yes  Increased stress: No  Excessive hunger: No  Excessive thirst: No  Feeling hot or cold when others believe the temperature is normal: Yes  Loss of height: No  Post-operative complications: No  Surgical site pain: No  Hallucinations: No  Change in or Loss of Energy: Yes  Hyperactivity: No  Confusion: Yes  Changes in hair: No  Changes in moles/birth marks: No  Itching: No  Rashes: No  Changes in nails: No  Acne: No  Hair in places you don't want it: No  Change in facial hair: No  Warts: No  Non-healing sores: No  Scarring: Yes  Flaking of skin: No  Color changes of hands/feet in cold : Yes  Sun sensitivity: Yes  Skin thickening: No  Ear pain: Yes  Ear discharge: No  Hearing loss: No  Tinnitus: Yes  Nosebleeds: Yes  Congestion: No  Sinus pain: Yes  Trouble swallowing: Yes   Voice hoarseness: No  Mouth sores: Yes  Sore throat: No  Tooth pain: Yes  Gum tenderness: Yes  Bleeding gums: Yes  Change in taste: No  Change in sense of smell: No  Dry mouth: Yes  Hearing aid used: No  Neck lump: No  Eye pain: Yes  Vision loss: No  Dry eyes: Yes  Watery eyes: Yes  Eye bulging: No  Double vision: No  Flashing of lights: No  Spots: Yes  Floaters: Yes  Redness: Yes  Crossed eyes: No  Tunnel Vision: No  Yellowing of eyes: No  Eye irritation: Yes  Cough: No  Sputum or phlegm: No  Coughing up blood: No  Difficulty breating or shortness of breath: Yes  Snoring: No  Wheezing: No  Difficulty breathing on exertion: Yes  Respiratory pain: Yes  Nighttime  Cough: No  Difficulty breathing when lying flat: Yes  Chest pain or pressure: Yes  Fast or irregular heartbeat: Yes  Pain in legs with walking: Yes  Swelling in feet or ankles: Yes  Trouble breathing while lying down: Yes  Fingers or Toes appear blue: Yes  High blood pressure: Yes  Low blood pressure: No  Fainting: Yes  Murmurs: No  Chest pain on exertion: Yes  Chest pain at rest: Yes  Cramping pain in leg during exercise: Yes  Pacemaker: No  Varicose veins: No  Edema or swelling: No  Fast heart beat: Yes  Wake up at night with shortness of breath: Yes  Heart flutters: No  Light-headedness: Yes  Exercise intolerance: Yes  Heart burn or indigestion: Yes  Nausea: Yes  Vomiting: No  Abdominal pain: Yes  Bloating: Yes  Constipation: Yes  Diarrhea: Yes  Blood in stool: No  Black stools: No  Rectal or Anal pain: No  Fecal incontinence: No  Rectal bleeding: No  Yellowing of skin or eyes: No  Vomit with blood: No  Change in stools: No  Hemorrhoids: No  Back pain: Yes  Muscle aches: Yes  Neck pain: Yes  Swollen joints: Yes  Joint pain: Yes  Bone pain: Yes  Muscle cramps: Yes  Muscle weakness: Yes  Joint stiffness: Yes  Bone fracture: No  Anemia: No  Swollen glands: No  Easy bleeding or bruising: Yes  Trouble with coordination: Yes  Dizziness or trouble with balance: Yes  Fainting or black-out spells: Yes  Memory loss: Yes  Headache: Yes  Seizures: No  Speech problems: No  Tingling: Yes  Tremor: No  Weakness: Yes  Difficulty walking: Yes  Paralysis: No  Numbness: Yes  Nervous or Anxious: Yes  Depression: No  Trouble sleeping: Yes  Trouble thinking or concentrating: Yes  Mood changes: Yes  Panic attacks: No

## 2017-01-27 ENCOUNTER — TELEPHONE (OUTPATIENT)
Dept: RHEUMATOLOGY | Facility: CLINIC | Age: 22
End: 2017-01-27

## 2017-01-27 DIAGNOSIS — F95.9 TIC: Primary | ICD-10-CM

## 2017-01-27 NOTE — TELEPHONE ENCOUNTER
guanFACINE 1 mg oral tablet      Last Written Prescription Date:  6/7/16  Last Fill Quantity: 180,   # refills: 3  Last Office Visit with [facility], [facility] or [facility] prescribing provider: 1/17/27  Future Office visit:    Next 5 appointments (look out 90 days)     Mar 14, 2017  9:30 AM   Return Visit with Layla Browne [facility]    [address] [place] [id number] [phone]                   Routing refill request to provider for review/approval because:  Drug not on the [facility], [facility] or [facility] refill protocol or controlled substance

## 2017-01-28 NOTE — TELEPHONE ENCOUNTER
Paged this evening by Ms. Oropeza's boyfriend stating that Ms. 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 She has been titrating up the otezla and over past few days has been at max dose 30 mg BID. He reports she does not have any hives, oropharyngeal edema, SOB, new chest pain, fevers. I reviewed otezla SE on UTD and it appears nausea is quite common SE and muscle spasms may also be seen (although much rarer). I did review her chart. It appears she has some underlying chronic abdominal symptoms of IBS and chronic abdominal pain, diffuse myofascial pain. Asked them to hold the otezla this weekend and to try tylenol PRN. They did say she had flexeril to use PRN at home, told them she could try this as well. Will pass message along to her outpatient rheumatologist Dr. Marquez and the clinic team.   Teresa Baldwin MD  Rheumatology Fellow

## 2017-01-31 NOTE — TELEPHONE ENCOUNTER
Routing to Sonja -- please review. Not on protocol. Thank you    Mare Regalado, MARIANN, RN  [facility]

## 2017-02-01 RX ORDER — GUANFACINE 1 MG/1
TABLET ORAL
Qty: 180 TABLET | Refills: 3 | Status: SHIPPED | OUTPATIENT
Start: 2017-02-01 | End: 2017-06-05

## 2017-02-07 ENCOUNTER — MEDICAL CORRESPONDENCE (OUTPATIENT)
Dept: HEALTH INFORMATION MANAGEMENT | Facility: CLINIC | Age: 22
End: 2017-02-07

## 2017-02-15 ENCOUNTER — HOSPITAL ENCOUNTER (OUTPATIENT)
Facility: CLINIC | Age: 22
Discharge: HOME | End: 2017-02-15
Attending: INTERNAL MEDICINE | Admitting: INTERNAL MEDICINE
Payer: COMMERCIAL

## 2017-02-15 ENCOUNTER — SURGERY (OUTPATIENT)
Age: 22
End: 2017-02-15

## 2017-02-15 PROCEDURE — 25000125 ZZHC RX 250: Performed by: INTERNAL MEDICINE

## 2017-02-15 PROCEDURE — 91038 ESOPH IMPED FUNCT TEST > 1HR: CPT | Performed by: INTERNAL MEDICINE

## 2017-02-15 RX ADMIN — LIDOCAINE HYDROCHLORIDE 5 ML: 20 SOLUTION ORAL; TOPICAL at 09:48

## 2017-02-15 NOTE — OR NURSING
Pt here for manometry/24 hr ph. Procedure explained.  Catheter then placed easily to 60 cm. 2 %viscous lidocaine used per pts request.  Catheter calibrated and pulled back to 47.5   cm for swallows. Swallows given per protocol and pt tolerated  well. Catheter removed. Ph probe then placed  Easily to 40 cm and pt tolerated well. Probe pulled to 32   cm for study.DC and diary instructions given. Pt dc to home in NAD.

## 2017-02-15 NOTE — IP AVS SNAPSHOT
MRN:[id number]                      After Visit Summary   2/15/2017    Samara Oropeza    MRN: [id number]           Thank you!     Thank you for choosing [facility] for your care. Our goal is always to provide you with excellent care. Hearing back from our patients is one way we can continue to improve our services. Please take a few minutes to complete the written survey that you may receive in the mail after you visit with us. Thank you!        Patient Information     Date Of Birth          1995        About your hospital stay     You were admitted on:  February 15, 2017 You last received care in the:  [facility], Endoscopy    You were discharged on:  February 15, 2017       Who to Call     For medical emergencies, please call 911.  For non-urgent questions about your medical care, please call your primary care provider or clinic, [phone]  For questions related to your surgery, please call your surgery clinic        Attending Provider     Provider Specialty    Timothy Matta MD Gastroenterology       Primary Care Provider Office Phone # Fax #    Sonja EVI Laguerre [phone]       [facility] [address] [place] [id number]        Your next 10 appointments already scheduled     Feb 17, 2017  1:30 PM CST   (Arrive by 1:15 PM)   Return Visit with EVI Vizcaino CNP   [facility] G[facility]ology [facility] ([facility])    [address] [facility]  [address] [facility] [id number] [phone]            Mar 08, 2017 11:30 AM CST   New Visit with ALISA Burt   [facility] ([facility])    [address] [place] [id number] [phone]            Mar 14, 2017  9:30 AM CDT   Return Visit with ALISA Burt   [facility] ([facility])    [address] [place] [address]  [facility] [phone] [phone]            Mar 27, 2017  2:20 PM CDT   (Arrive by 2:05 PM)   Return Botox with Frederick Bender MD   [facility] Medicine [facility] ([facility])    [address] [place] [id number] [phone]            Mar 31, 2017  2:00 PM CDT   (Arrive by 1:45 PM)   Return Visit with Austen Marquez MD   [facility] Rheumatology ([facility])    [address] [place] [id number] [phone]              Further instructions from your care team       1.  May resume regular diet.    2.  May have bloody nose, stuffy nose or sore throat after procedure.    3.  If 24 pH probe placed, return the next day to have probe removed.  Removal will only        take 5 minutes.    4.  Any questions call [phone] from 7AM to 4:30PM.  After hours call [phone]      and ask for GI fellow on call.      Pending Results     No orders found from 2/13/2017 to 2/16/2017.            Admission Information     Date & Time Provider Department Dept. Phone    2/15/2017 Timothy Matta MD [facility], Endoscopy [phone]      Your Vitals Were     Last Period                   02/08/2017           MyChart Information     [facility] gives you secure access to your electronic health record. If you see a primary care provider, you can also send messages to your care team and make appointments. If you have questions, please call your primary care clinic.  If you do not have a primary care provider, please call [phone] and they will assist you.        Care EveryWhere ID     This is your Care EveryWhere ID. This could be used by other organizations to access your [facility] medical records  [id number]           Review of your medicines      UNREVIEWED medicines. Ask your doctor about these medicines        Dose / Directions    albuterol 108 (90 BASE) MCG/ACT Inhaler    Commonly known as:  PROAIR HFA/PROVENTIL HFA/VENTOLIN HFA   Used for:  Atypical chest pain        Dose:  2 puff   Inhale 2 puffs into the lungs every 6 hours as needed for shortness of breath / dyspnea or wheezing   Quantity:  1 Inhaler   Refills:  1       amitriptyline 25 MG tablet   Commonly known as:  ELAVIL   Used for:  Atypical chest pain, Insomnia, unspecified type        Dose:  25 mg   Take 1 tablet (25 mg) by mouth At Bedtime May increase to 2, if no benefit after 2 weeks.  Feel free to call / page the nurse for Dr. Mcmahon at [phone] / [phone] with questions regarding this order.   Quantity:  45 tablet   Refills:  3       * apremilast 30 MG tablet   Commonly known as:  OTEZLA   Used for:  Behcet's disease (H)        Dose:  30 mg   Take 1 tablet (30 mg) by mouth 2 times daily   Quantity:  60 tablet   Refills:  3       * Apremilast 10 & 20 & 30 MG Tbpk   Commonly known as:  OTEZLA   Used for:  Behcet's disease (H)        Take one tablet in the morning on day 1, then take one tablet every 12 hours on days 2 thru 14, according to the instructions on the packet.   Quantity:  27 tablet   Refills:  0       betamethasone valerate 0.1 % cream   Commonly known as:  VALISONE        Apply topically 2 times daily   Refills:  0       * botulinum toxin type A 100 UNITS injection   Commonly known as:  BOTOX   Used for:  Cervical dystonia        Dose:  200 Units   Inject 200 Units into the muscle every 3 months   Quantity:  200 Units   Refills:  3       * BOTOX IJ        Dose:  150 Units   Inject 150 Units into the muscle once Lot: /C3 Exp: 05/2019   Refills:  0       * BOTOX IJ        Dose:  150 Units   Inject 150 Units into the muscle Lot # /C3  Exp: 8/2019   Refills:  0       carbamide peroxide 6.5 % otic solution   Commonly known as:  DEBROX        Dose:  5 drop   5 drops 2 times daily   Refills:  0       clobetasol 0.05 % cream   Commonly known as:  TEMOVATE   Used for:  Folliculitis        Apply  topically 2 times daily   Quantity:  60 g   Refills:  0       Colchicine 0.6 MG Caps   Used for:  Behcet's syndrome (H)        Dose:  1 capsule   Take 0.6 mg by mouth See Admin Instructions 2 capsules in AM and 1 capsule in PM   Quantity:  90 capsule   Refills:  3       * dicyclomine 20 MG tablet   Commonly known as:  BENTYL   Used for:  Abdominal pain, epigastric        Dose:  20 mg   Take 1 tablet (20 mg) by mouth every 6 hours   Quantity:  90 tablet   Refills:  1       * dicyclomine 10 MG capsule   Commonly known as:  BENTYL   Used for:  Abdominal cramping        Dose:  10-20 mg   Take 1-2 capsules (10-20 mg) by mouth 2 times daily as needed   Quantity:  120 capsule   Refills:  3       EPINEPHrine 0.3 MG/0.3ML injection   Commonly known as:  EPIPEN   Used for:  Hx of bee sting allergy        Dose:  0.3 mg   Inject 0.3 mLs into the muscle once as needed for anaphylaxis for 1 dose. And call 911.   Quantity:  2 each   Refills:  1       EXCEDRIN MIGRAINE PO        Take by mouth as needed   Refills:  0       guanFACINE 1 MG tablet   Commonly known as:  TENEX   Used for:  Tic        3 tablets in the morning and 3 tablets in the evening   Quantity:  180 tablet   Refills:  3       hyoscyamine 0.125 MG tablet   Commonly known as:  ANASPAZ/LEVSIN   Used for:  Abdominal pain, generalized        Dose:  0.125-0.25 mg   Take 1-2 tablets (125-250 mcg) by mouth every 4 hours as needed for cramping   Quantity:  40 tablet   Refills:  1       hypromellose 0.3 % Soln ophthalmic solution   Commonly known as:  GENTEAL        Dose:  1 drop   1 drop every hour as needed   Refills:  0       LACTAID PO        Take by mouth daily   Refills:  0       lidocaine 2 % topical gel   Commonly known as:  XYLOCAINE        Dose:  1 Tube   Apply 1 Tube topically daily   Refills:  0       lidocaine 5 % Patch   Commonly known as:  LIDODERM   Used for:  Chronic left-sided low back pain with left-sided sciatica        Apply up to 3 patches to painful  area at once for up to 12 h within a 24 h period.  Remove after 12 hours.   Quantity:  30 patch   Refills:  1       lidocaine visc 2% 2.5mL/5mL & maalox/mylanta w/ simeth 2.5mL/5mL & diphenhydrAMINE 5mg/5mL Susp suspension   Commonly known as:  MAGIC Mouthwash   Used for:  Behcet's syndrome (H)        Dose:  10 mL   Swish and swallow 10 mLs in mouth every 6 hours as needed for mouth sores   Quantity:  1 Bottle   Refills:  1       linaclotide 290 MCG capsule   Commonly known as:  LINZESS   Used for:  Irritable bowel syndrome        Dose:  290 mcg   Take 1 capsule (290 mcg) by mouth every morning (before breakfast)   Quantity:  30 capsule   Refills:  1       LORazepam 1 MG tablet   Commonly known as:  ATIVAN   Used for:  Chronic abdominal pain        Dose:  1-2 mg   Take 1-2 tablets (1-2 mg) by mouth every 8 hours as needed for other (abdominal pain) Do not operate a vehicle after taking this medication   Quantity:  90 tablet   Refills:  0       lubiprostone 24 MCG capsule   Commonly known as:  AMITIZA   Used for:  Chronic idiopathic constipation        Dose:  24 mcg   Take 1 capsule (24 mcg) by mouth 2 times daily (with meals)   Quantity:  60 capsule   Refills:  3       meclizine 25 MG tablet   Commonly known as:  ANTIVERT   Used for:  Nausea        Dose:  25 mg   Take 1 tablet (25 mg) by mouth every 6 hours as needed for dizziness   Quantity:  30 tablet   Refills:  1       metoclopramide 5 MG tablet   Commonly known as:  REGLAN   Used for:  Gastroparesis        Dose:  5 mg   Take 1 tablet (5 mg) by mouth 2 times daily as needed   Quantity:  60 tablet   Refills:  2       Multi-vitamin Tabs tablet        Dose:  1 tablet   Take 1 tablet by mouth daily   Refills:  0       norgestimate-ethinyl estradiol 0.25-35 MG-MCG per tablet   Commonly known as:  ORTHO-CYCLEN, SPRINTEC   Used for:  General counseling for prescription of oral contraceptives        Dose:  1 tablet   Take 1 tablet by mouth daily   Quantity:  84 tablet    Refills:  3       omeprazole 40 MG capsule   Commonly known as:  priLOSEC   Used for:  Gastroesophageal reflux disease, esophagitis presence not specified        Dose:  40 mg   Take 1 capsule (40 mg) by mouth daily   Quantity:  30 capsule   Refills:  9       ondansetron 4 MG ODT tab   Commonly known as:  ZOFRAN-ODT   Used for:  Chronic abdominal pain        Dose:  4 mg   Take 1 tablet (4 mg) by mouth 3 times daily as needed for nausea   Quantity:  120 tablet   Refills:  3       polyethylene glycol powder   Commonly known as:  MIRALAX/GLYCOLAX        Dose:  17 g   Take 17 g by mouth 2 times daily   Refills:  0       promethazine 25 MG tablet   Commonly known as:  PHENERGAN   Used for:  Intractable chronic migraine without aura and without status migrainosus        Dose:  12.5-25 mg   Take 0.5-1 tablets (12.5-25 mg) by mouth every 6 hours as needed for nausea   Quantity:  20 tablet   Refills:  1       psyllium 63 % Powd   Commonly known as:  METAMUCIL SMOOTH TEXTURE   Used for:  Constipation        Dose:  3 teaspoonful   Take 3 teaspoonful by mouth 3 times daily Mix in 8 ounces of water   Quantity:  1 Bottle   Refills:  11       RANITIDINE 75 PO        Take  by mouth. As needed for GI upset   Refills:  0       sucralfate 1 GM/10ML suspension   Commonly known as:  CARAFATE   Used for:  Bile reflux gastritis, Nausea        Dose:  1 g   Take 10 mLs (1 g) by mouth 4 times daily   Quantity:  1200 mL   Refills:  2       SUMAtriptan 100 MG tablet   Commonly known as:  IMITREX   Used for:  Intractable chronic migraine without aura and without status migrainosus        Dose:  100 mg   Take 1 tablet (100 mg) by mouth at onset of headache for migraine May repeat in 2 hours. Max 2 tablets/24 hours.   Quantity:  18 tablet   Refills:  3       triamcinolone 0.1 % cream   Commonly known as:  KENALOG   Used for:  Behcet's syndrome (H)        Apply sparingly to oral ulcers three times daily for 14 days as needed.   Quantity:  15 g    Refills:  1       UNABLE TO FIND        daily MEDICATION NAME: Peppermint supplement   Refills:  0       VITAMIN B6 PO        Refills:  0       * Notice:  This list has 7 medication(s) that are the same as other medications prescribed for you. Read the directions carefully, and ask your doctor or other care provider to review them with you.             Protect others around you: Learn how to safely use, store and throw away your medicines at www.disposemymeds.org.             Medication List: This is a list of all your medications and when to take them. Check marks below indicate your daily home schedule. Keep this list as a reference.      Medications           Morning Afternoon Evening Bedtime As Needed    albuterol 108 (90 BASE) MCG/ACT Inhaler   Commonly known as:  PROAIR HFA/PROVENTIL HFA/VENTOLIN HFA   Inhale 2 puffs into the lungs every 6 hours as needed for shortness of breath / dyspnea or wheezing                                amitriptyline 25 MG tablet   Commonly known as:  ELAVIL   Take 1 tablet (25 mg) by mouth At Bedtime May increase to 2, if no benefit after 2 weeks.  Feel free to call / page the nurse for Dr. Mcmahon at [phone] / [phone] with questions regarding this order.                                * apremilast 30 MG tablet   Commonly known as:  OTEZLA   Take 1 tablet (30 mg) by mouth 2 times daily                                * Apremilast 10 & 20 & 30 MG Tbpk   Commonly known as:  OTEZLA   Take one tablet in the morning on day 1, then take one tablet every 12 hours on days 2 thru 14, according to the instructions on the packet.                                betamethasone valerate 0.1 % cream   Commonly known as:  VALISONE   Apply topically 2 times daily                                * botulinum toxin type A 100 UNITS injection   Commonly known as:  BOTOX   Inject 200 Units into the muscle every 3 months                                * BOTOX IJ   Inject 150 Units into the  muscle once Lot: /C3 Exp: 05/2019                                * BOTOX IJ   Inject 150 Units into the muscle Lot # /C3  Exp: 8/2019                                carbamide peroxide 6.5 % otic solution   Commonly known as:  DEBROX   5 drops 2 times daily                                clobetasol 0.05 % cream   Commonly known as:  TEMOVATE   Apply topically 2 times daily                                Colchicine 0.6 MG Caps   Take 0.6 mg by mouth See Admin Instructions 2 capsules in AM and 1 capsule in PM                                * dicyclomine 20 MG tablet   Commonly known as:  BENTYL   Take 1 tablet (20 mg) by mouth every 6 hours                                * dicyclomine 10 MG capsule   Commonly known as:  BENTYL   Take 1-2 capsules (10-20 mg) by mouth 2 times daily as needed                                EPINEPHrine 0.3 MG/0.3ML injection   Commonly known as:  EPIPEN   Inject 0.3 mLs into the muscle once as needed for anaphylaxis for 1 dose. And call 911.                                EXCEDRIN MIGRAINE PO   Take by mouth as needed                                guanFACINE 1 MG tablet   Commonly known as:  TENEX   3 tablets in the morning and 3 tablets in the evening                                hyoscyamine 0.125 MG tablet   Commonly known as:  ANASPAZ/LEVSIN   Take 1-2 tablets (125-250 mcg) by mouth every 4 hours as needed for cramping                                hypromellose 0.3 % Soln ophthalmic solution   Commonly known as:  GENTEAL   1 drop every hour as needed                                LACTAID PO   Take by mouth daily                                lidocaine 2 % topical gel   Commonly known as:  XYLOCAINE   Apply 1 Tube topically daily                                lidocaine 5 % Patch   Commonly known as:  LIDODERM   Apply up to 3 patches to painful area at once for up to 12 h within a 24 h period.  Remove after 12 hours.                                lidocaine visc 2%  2.5mL/5mL & maalox/mylanta w/ simeth 2.5mL/5mL & diphenhydrAMINE 5mg/5mL Susp suspension   Commonly known as:  MAGIC Mouthwash   Swish and swallow 10 mLs in mouth every 6 hours as needed for mouth sores                                linaclotide 290 MCG capsule   Commonly known as:  LINZESS   Take 1 capsule (290 mcg) by mouth every morning (before breakfast)                                LORazepam 1 MG tablet   Commonly known as:  ATIVAN   Take 1-2 tablets (1-2 mg) by mouth every 8 hours as needed for other (abdominal pain) Do not operate a vehicle after taking this medication                                lubiprostone 24 MCG capsule   Commonly known as:  AMITIZA   Take 1 capsule (24 mcg) by mouth 2 times daily (with meals)                                meclizine 25 MG tablet   Commonly known as:  ANTIVERT   Take 1 tablet (25 mg) by mouth every 6 hours as needed for dizziness                                metoclopramide 5 MG tablet   Commonly known as:  REGLAN   Take 1 tablet (5 mg) by mouth 2 times daily as needed                                Multi-vitamin Tabs tablet   Take 1 tablet by mouth daily                                norgestimate-ethinyl estradiol 0.25-35 MG-MCG per tablet   Commonly known as:  ORTHO-CYCLEN, SPRINTEC   Take 1 tablet by mouth daily                                omeprazole 40 MG capsule   Commonly known as:  priLOSEC   Take 1 capsule (40 mg) by mouth daily                                ondansetron 4 MG ODT tab   Commonly known as:  ZOFRAN-ODT   Take 1 tablet (4 mg) by mouth 3 times daily as needed for nausea                                polyethylene glycol powder   Commonly known as:  MIRALAX/GLYCOLAX   Take 17 g by mouth 2 times daily                                promethazine 25 MG tablet   Commonly known as:  PHENERGAN   Take 0.5-1 tablets (12.5-25 mg) by mouth every 6 hours as needed for nausea                                psyllium 63 % Powd   Commonly known as:   METAMUCIL SMOOTH TEXTURE   Take 3 teaspoonful by mouth 3 times daily Mix in 8 ounces of water                                RANITIDINE 75 PO   Take  by mouth. As needed for GI upset                                sucralfate 1 GM/10ML suspension   Commonly known as:  CARAFATE   Take 10 mLs (1 g) by mouth 4 times daily                                SUMAtriptan 100 MG tablet   Commonly known as:  IMITREX   Take 1 tablet (100 mg) by mouth at onset of headache for migraine May repeat in 2 hours. Max 2 tablets/24 hours.                                triamcinolone 0.1 % cream   Commonly known as:  KENALOG   Apply sparingly to oral ulcers three times daily for 14 days as needed.                                UNABLE TO FIND   daily MEDICATION NAME: Peppermint supplement                                VITAMIN B6 PO                                * Notice:  This list has 7 medication(s) that are the same as other medications prescribed for you. Read the directions carefully, and ask your doctor or other care provider to review them with you.

## 2017-02-15 NOTE — IP AVS SNAPSHOT
[facility], [place], Endoscopy    [address] [address]    Phone:  [phone]                                       After Visit Summary   2/15/2017    Samara Oropeza    MRN: [id number]           After Visit Summary Signature Page     I have received my discharge instructions, and my questions have been answered. I have discussed any challenges I see with this plan with the nurse or doctor.    ..........................................................................................................................................  Patient/Patient Representative Signature      ..........................................................................................................................................  Patient Representative Print Name and Relationship to Patient    ..................................................               ................................................  Date                                            Time    ..........................................................................................................................................  Reviewed by Signature/Title    ...................................................              ..............................................  Date                                                            Time

## 2017-02-15 NOTE — DISCHARGE INSTRUCTIONS
1.  May resume regular diet.    2.  May have bloody nose, stuffy nose or sore throat after procedure.    3.  If 24 pH probe placed, return the next day to have probe removed.  Removal will only        take 5 minutes.    4.  Any questions call [phone] from 7AM to 4:30PM.  After hours call [phone]      and ask for GI fellow on call.

## 2017-02-16 ENCOUNTER — TELEPHONE (OUTPATIENT)
Dept: RHEUMATOLOGY | Facility: CLINIC | Age: 22
End: 2017-02-16

## 2017-02-16 NOTE — TELEPHONE ENCOUNTER
----- Message from Austen Marquez MD sent at 2/16/2017 11:58 AM CST -----  Regarding: FW: call on weekend  Lori,  We do not have many other options for her disease. I recommend that we resume otezla at a lower dose 30mg ONCE DAILY for two weeks and then increase to 30mg twice daily if tolerated. Please discuss with the patient, if she is open for this recommendation.   Thanks  Valdez    ----- Message -----     From: Calli Baldwin MD     Sent: 1/27/2017   8:56 PM       To: Austen Marquez MD, #  Subject: call on weekend                                  Hi team - I was paged by Ms. Oropeza's boyfriend Friday evening regarding symptoms of severe nausea and muscle spasms in Ms. Oropeza. She started otezla 8d ago for behcet's and had been titrating up to max dose 30 mg bid for past few days. Denied hives, oropharyngeal edema, SOB, new chest pain. I reviewed otezla on UTD and it appears nausea is common side effect, muscle spasms are also side effect but rare. I also reviewed her chart as well. Told them to hold the otezla this weekend, to take tylenol for the muscle spasms and to try a flexeril if needed which she has at home already to use PRN. Informed them if symptoms worsened to go to ED for further workup and treatment. Just passing this on as a heads up.   Thanks  Teresa

## 2017-02-16 NOTE — TELEPHONE ENCOUNTER
Attempted to call.  Left message requesting a return call.    Lori Miner RN  Rheumatology Clinic

## 2017-02-17 ENCOUNTER — TELEPHONE (OUTPATIENT)
Dept: GASTROENTEROLOGY | Facility: CLINIC | Age: 22
End: 2017-02-17

## 2017-02-21 ENCOUNTER — DOCUMENTATION ONLY (OUTPATIENT)
Dept: RHEUMATOLOGY | Facility: CLINIC | Age: 22
End: 2017-02-21

## 2017-02-21 NOTE — TELEPHONE ENCOUNTER
Called and spoke with pt, discussed Otezla and side effects.  Per pt, she was directed to restart the Otezla at 10 mg BID for 2 weeks, then 20 mg BID for 2 weeks, then increase to 30 mg BID as tolerated.  Pt restarted the Otezla at 10 mg BID on 2/2, and just increased to 20 mg BID today.     Pt reports continuing muscle spasms throughout the day, gets worse for several hours after taking the Otezla, but had them prior to starting Otezla.  Does have Flexaril but avoids taking it as it does make her sleepy.  Muscle spasms are located in her Left side from her shoulder down to her lower back.    Pt continuing to have Nausea/Vomiting, although the nausea is better, and so is the vomiting, although both increase for about 4 hours after taking the Otezla.  She does have Zofran for previous nausea, but states it does not help much.    Diarrhea that she was experiencing when she first started Otezla has resolved.    Pt made aware that I will discuss current regimen with provider prior to changing to his recommendations of 30 mg once a day for 2 weeks, then increase to 30 mg twice daily if tolerated.    Will route to provider for review and advice.    Lori Miner RN  [facility]

## 2017-02-21 NOTE — PROGRESS NOTES
Form Request Documentation    Date Received:2/21/17    Mode: fax (Mail, Fax, In-person, e-mail)    Name of Form:FMLA form for patient's father    Date given to Provider:2/17/17    Provider Agreed to complete form? Yes. Please note that per provider he will not fill out a form for the patient's mother. This form is for the patients father.    Date Returned to support Staff:2/21/17    Date patient notified:2/21/17    Disposition of Form: faxed (mailed, faxed, picked up)

## 2017-02-22 NOTE — TELEPHONE ENCOUNTER
The Otezla starter pack has only limited 10mg and 20mg pills. Not sure how the patient obtained this many number of 10mg/20mg pills. Please make sure patient is taking the right dose.   If only 30mg pills are available, okay to take 15mg daily for the next 2 weeks and then reassessing. Hopefully the nausea and muscle spasms continues to improve. It is okay if patient wants to avoid flexaril and zofran.

## 2017-02-22 NOTE — TELEPHONE ENCOUNTER
Called and speak with pt.  She has been cutting the pills with a pill cutter into 3 10 mg sections. Let her know that I talked with Dr. Marquez and he would like her to just take 15 mg, so cut the 30 mg pill in half and take that once a day for the next 2 weeks and we can see how she is feeling then.    Pt understands.  Will follow up with her in 2 weeks.    Lori Miner RN  [facility]

## 2017-03-08 ENCOUNTER — OFFICE VISIT (OUTPATIENT)
Dept: BEHAVIORAL HEALTH | Facility: CLINIC | Age: 22
End: 2017-03-08
Attending: NURSE PRACTITIONER
Payer: COMMERCIAL

## 2017-03-08 DIAGNOSIS — F41.1 GAD (GENERALIZED ANXIETY DISORDER): ICD-10-CM

## 2017-03-08 DIAGNOSIS — F33.1 MODERATE EPISODE OF RECURRENT MAJOR DEPRESSIVE DISORDER (H): Primary | ICD-10-CM

## 2017-03-08 PROCEDURE — 90791 PSYCH DIAGNOSTIC EVALUATION: CPT | Performed by: SOCIAL WORKER

## 2017-03-08 ASSESSMENT — ANXIETY QUESTIONNAIRES
3. WORRYING TOO MUCH ABOUT DIFFERENT THINGS: MORE THAN HALF THE DAYS
1. FEELING NERVOUS, ANXIOUS, OR ON EDGE: NEARLY EVERY DAY
2. NOT BEING ABLE TO STOP OR CONTROL WORRYING: SEVERAL DAYS
6. BECOMING EASILY ANNOYED OR IRRITABLE: MORE THAN HALF THE DAYS
7. FEELING AFRAID AS IF SOMETHING AWFUL MIGHT HAPPEN: SEVERAL DAYS
5. BEING SO RESTLESS THAT IT IS HARD TO SIT STILL: MORE THAN HALF THE DAYS
GAD7 TOTAL SCORE: 14

## 2017-03-08 ASSESSMENT — PATIENT HEALTH QUESTIONNAIRE - PHQ9: 5. POOR APPETITE OR OVEREATING: NEARLY EVERY DAY

## 2017-03-08 NOTE — MR AVS SNAPSHOT
MRN:[id number]                      After Visit Summary   3/8/2017    Samara Oropeza    MRN: [id number]           Visit Information        Provider Department      3/8/2017 11:30 AM Layla Browne [facility] Generic      Your next 10 appointments already scheduled     Mar 14, 2017  9:30 AM CDT   Return Visit with Layla Browne [facility] ([facility])    [address] [address]   [phone]            Mar 22, 2017  1:30 PM CDT   Return Visit with Layla Browne [facility] ([facility])    [address] [id number] [phone]            Mar 27, 2017  2:20 PM CDT   (Arrive by 2:05 PM)   Return Botox with Frederick Bender MD   [facility] ([facility])    [address] [place] [id number] [phone]            Mar 28, 2017 10:30 AM CDT   Return Visit with Layla Browne [facility] ([facility])    [address] [id number] [phone]            Mar 31, 2017  2:00 PM CDT   (Arrive by 1:45 PM)   Return Visit with Austen Marquez MD   [facility] ([facility])    [address] [place] [address] [id number] [phone]            Apr 21, 2017 11:00 AM CDT   (Arrive by 10:45 AM)   Return Visit with EVI Vizcaino CNP   [facility] IBD ([facility])    [address] [place] [address] [id number] [phone]              MyChart Information     MyChart gives you secure access to your electronic health record. If you see a primary care provider,  you can also send messages to your care team and make appointments. If you have questions, please call your primary care clinic.  If you do not have a primary care provider, please call [phone] and they will assist you.        Care EveryWhere ID     This is your Care EveryWhere ID. This could be used by other organizations to access your [facility] medical records  [id number]

## 2017-03-08 NOTE — Clinical Note
Dear Dr. Abreu,  Please be informed that your patient was seen for a diagnostic evaluation on 3/8/2017.  The initial DSM-IV diagnosis are TAHIR and MDD, recurrent, moderate.  A follow-up appt is scheduled for  3/14/17.  The evaluation was completed by RODY Thompson, ALISA.   RODY Reardon, ALISA Outpatient Clinic Therapist [facility] [phone]
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
## 2017-03-09 ASSESSMENT — PATIENT HEALTH QUESTIONNAIRE - PHQ9: SUM OF ALL RESPONSES TO PHQ QUESTIONS 1-9: 15

## 2017-03-09 ASSESSMENT — ANXIETY QUESTIONNAIRES: GAD7 TOTAL SCORE: 14

## 2017-03-14 ENCOUNTER — OFFICE VISIT (OUTPATIENT)
Dept: BEHAVIORAL HEALTH | Facility: CLINIC | Age: 22
End: 2017-03-14
Attending: NURSE PRACTITIONER
Payer: COMMERCIAL

## 2017-03-14 DIAGNOSIS — F33.1 MODERATE EPISODE OF RECURRENT MAJOR DEPRESSIVE DISORDER (H): ICD-10-CM

## 2017-03-14 DIAGNOSIS — F41.1 GAD (GENERALIZED ANXIETY DISORDER): Primary | ICD-10-CM

## 2017-03-14 PROCEDURE — 90834 PSYTX W PT 45 MINUTES: CPT | Performed by: SOCIAL WORKER

## 2017-03-14 NOTE — MR AVS SNAPSHOT
MRN:[id number]                      After Visit Summary   3/14/2017    Samara Oropeza    MRN: [id number]           Visit Information        Provider Department      3/14/2017 9:30 AM Layla Browne [facility] Generic      Your next 10 appointments already scheduled     Mar 22, 2017  1:30 PM CDT   Return Visit with Layla Browne [facility] ([facility])    [address] [id number]   [phone]            Mar 27, 2017  2:20 PM CDT   (Arrive by 2:05 PM)   Return Botox with Frederick Bender MD   [facility] ([facility])    [address] [place] [id number] [phone]            Mar 28, 2017 10:30 AM CDT   Return Visit with Layla Browne [facility] ([facility])    [address] [place] [id number] [phone]            Mar 31, 2017  2:00 PM CDT   (Arrive by 1:45 PM)   Return Visit with Austen Marquez MD   [facility] Rheumatology ([facility])    [address] [place] [id number] [phone]            Apr 21, 2017 11:00 AM CDT   (Arrive by 10:45 AM)   Return Visit with EVI Vizcaino CNP   [facility] Gastroenterology and IBD ([facility])    [address] [place] [id number] [phone]              Care Instructions    Homework:      1.  Read Cognitive Distortions worksheet and False Pass the one that you do.    2.  As you are trying to fall asleep, use the mindfulness for sleep (13 minute Body Scan) from website:    [email or web address]           MyChart Information     [email or web address] gives you secure access to your electronic health record.  If you see a primary care provider, you can also send messages to your care team and make appointments. If you have questions, please call your primary care clinic.  If you do not have a primary care provider, please call [phone] and they will assist you.        Care EveryWhere ID     This is your Care EveryWhere ID. This could be used by other organizations to access your [place] medical records  [id number]

## 2017-03-14 NOTE — PATIENT INSTRUCTIONS
Homework:      1.  Read Cognitive Distortions worksheet and [place] the one that you do.    2.  As you are trying to fall asleep, use the mindfulness for sleep (13 minute Body Scan) from website:    [email or web address]

## 2017-03-14 NOTE — PROGRESS NOTES
Progress Note    Client Name: Samara Oropeza  Date: 3/14/2017          Service Type: Individual      Session Start Time: 9:40  Session End Time: 10;25      Session Length: 45     Session #: 2     Attendees: Client attended alone    Treatment Plan Last Reviewed: 3/14/2017   PHQ-9 / TAHIR-7 : 3/8/16     DATA      Progress Since Last Session (Related to Symptoms / Goals / Homework):   Symptoms: Stable    Homework: n/a- discussed treatment goals      Episode of Care Goals: No improvement - PREPARATION (Decided to change - considering how); Intervened by negotiating a change plan and determining options / strategies for behavior change, identifying triggers, exploring social supports, and working towards setting a date to begin behavior change     Current / Ongoing Stressors and Concerns:   Client reports that 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  She continues to have pain present.  Sleep is poor and client reports many vivid nightmares.  Gets minimal sleep.       Treatment Objective(s) Addressed in This Session:   use cognitive strategies identified in therapy to challenge anxious thoughts  Increase restful sleep     Intervention:   CBT: Introduced cognitive Distortions; Mindfulness        ASSESSMENT: Current Emotional / Mental Status (status of significant symptoms):   Risk status (Self / Other harm or suicidal ideation)   Client denies current fears or concerns for personal safety.   Client denies current or recent suicidal ideation or behaviors.   Client denies current or recent homicidal ideation or behaviors.   Client denies current or recent self injurious behavior or ideation.   Client denies other safety concerns.   A safety and risk management plan has not been developed at this time, however client was given the after-hours number / 911 should there be a change in any of these risk factors.     Appearance:   Appropriate    Eye Contact:   Fair     Psychomotor Behavior: Normal    Attitude:   Cooperative    Orientation:   All   Speech    Rate / Production: Monotone     Volume:  Soft    Mood:    Anxious    Affect:    Flat    Thought Content:  Clear    Thought Form:  Coherent  Logical    Insight:    Fair      Medication Review:   No current psychiatric medications prescribed     Medication Compliance:   NA     Changes in Health Issues:   Yes: Pain, Associated Psychological Distress- at 6/10     Chemical Use Review:   Substance Use: Chemical use reviewed, no active concerns identified      Tobacco Use: No current tobacco use.       Collateral Reports Completed:   Not Applicable    PLAN: (Client Tasks / Therapist Tasks / Other)  Homework:      1.  Read Cognitive Distortions worksheet and [place] the one that you do.    2.  As you are trying to fall asleep, use the mindfulness for sleep (13 minute Body Scan) from website:    [email or web address]        Layla Browne, LICSW                                                         ________________________________________________________________________    Treatment Plan    Client's Name: Samara Oropeza  YOB: 1995    Date: 3/14/2017     DSM-V Diagnoses: 296.32 Major Depressive Disorder, Recurrent Episode, Moderate _ or 300.02 (F41.1) Generalized Anxiety Disorder  Psychosocial / Contextual Factors: Moderate- Minimal contact with family, Health issues  WHODAS: 30    Referral / Collaboration:  Referral to another professional/service is not indicated at this time..    Anticipated number of session or this episode of care: 12      MeasurableTreatment Goal(s) related to diagnosis / functional impairment(s)  Goal 1: Client will decrease anxiety symptoms as eveidenced by self-report and TAHIR-7 scores, currently at 14, goal to 7 or below    I will know I've met my goal when I am better able to deal with it.      Objective #A (Client Action)    Client will use relaxation  strategies 3x times per day to reduce the physical symptoms of anxiety.  Status: New - Date: 3/14/17     Intervention(s)  Therapist will teach different relaxation strategies and have client complete one between session.    Objective #B  Client will use cognitive strategies identified in therapy to challenge anxious thoughts.  Status: New - Date: 3/14/17     Intervention(s)  Therapist will 1.  introduce cognitive distortions; 2. build awareness for client; 3. leanr an implement diffferent cognitive restructuring techniques.    Objective #C  Client will increase her amount of restful sleep.  Status: New - Date: 3/14/17     Intervention(s)  Therapist will 1. start a sleep journal; 2. Introduce techniques for falling and staying asleep.        Client has reviewed and agreed to the above plan.      Layla Browne, [facility]  March 14, 2017

## 2017-03-15 NOTE — TELEPHONE ENCOUNTER
Results not back from esophageal studies. Cancelling.  rescheduled for April.    Needs omeprazole (Prilosec)./ sent.

## 2017-03-21 ENCOUNTER — OFFICE VISIT (OUTPATIENT)
Dept: FAMILY MEDICINE | Facility: CLINIC | Age: 22
End: 2017-03-21
Payer: COMMERCIAL

## 2017-03-21 VITALS
HEART RATE: 102 BPM | BODY MASS INDEX: 27.23 KG/M2 | SYSTOLIC BLOOD PRESSURE: 110 MMHG | DIASTOLIC BLOOD PRESSURE: 76 MMHG | TEMPERATURE: 97.7 F | WEIGHT: 153.7 LBS | OXYGEN SATURATION: 100 %

## 2017-03-21 DIAGNOSIS — R10.9 CHRONIC ABDOMINAL PAIN: ICD-10-CM

## 2017-03-21 DIAGNOSIS — R55 SYNCOPE, UNSPECIFIED SYNCOPE TYPE: ICD-10-CM

## 2017-03-21 DIAGNOSIS — S93.402A SPRAIN OF LEFT ANKLE, UNSPECIFIED LIGAMENT, INITIAL ENCOUNTER: Primary | ICD-10-CM

## 2017-03-21 DIAGNOSIS — G89.29 CHRONIC ABDOMINAL PAIN: ICD-10-CM

## 2017-03-21 DIAGNOSIS — M79.672 LEFT FOOT PAIN: ICD-10-CM

## 2017-03-21 DIAGNOSIS — R10.84 ABDOMINAL PAIN, GENERALIZED: ICD-10-CM

## 2017-03-21 PROCEDURE — 99214 OFFICE O/P EST MOD 30 MIN: CPT | Performed by: NURSE PRACTITIONER

## 2017-03-21 RX ORDER — LORAZEPAM 1 MG/1
1 TABLET ORAL EVERY 8 HOURS PRN
Qty: 90 TABLET | Refills: 0 | Status: SHIPPED | OUTPATIENT
Start: 2017-03-21 | End: 2017-04-21

## 2017-03-21 RX ORDER — ONDANSETRON 4 MG/1
4 TABLET, ORALLY DISINTEGRATING ORAL 3 TIMES DAILY PRN
Qty: 120 TABLET | Refills: 3 | Status: SHIPPED | OUTPATIENT
Start: 2017-03-21 | End: 2017-04-14

## 2017-03-21 NOTE — MR AVS SNAPSHOT
After Visit Summary   3/21/2017    Samara Oropeza    MRN: [id number]           Patient Information     Date Of Birth          1995        Visit Information        Provider Department      3/21/2017 2:30 PM Sonja Abreu APRN [facility]        Today's Diagnoses     Sprain of left ankle, unspecified ligament, initial encounter    -  1    Left foot pain        Abdominal pain, generalized        Chronic abdominal pain           Follow-ups after your visit        Additional Services     MED THERAPY MANAGE REFERRAL       Your provider has referred you to: **[place] [facility] Scheduling (numerous locations) [phone]   [email or web address]  G: [facility] [phone]   [email or web address]    Reason for Referral: multiple medications    The [place] [facility] department will contact you to schedule an appointment.  You may also schedule the appointment by calling [phone].  For [facility]   [place] patients, please call [phone] to confirm/schedule your appointment on the next business day.    This service is designed to help you get the most from your medications.  A specially trained Pharmacist will work closely with you and your providers to solve any questions, concerns, issues or problems related to your medications.    Please bring all of your prescription and non-prescription medications (such as vitamins, over-the-counter medications, and herbals) or a detailed medication list to your appointment.    If you have a glucose meter or other home monitoring information, please also bring this to your appointment (i.e. blood glucose log, blood pressure log, pain log, etc.).            ORTHO  REFERRAL       [facility] is referring you to the Orthopedic   Services at [facility].       The  Representative will assist you in the coordination of your Orthopedic and Musculoskeletal Care as prescribed by your physician.    The  Representative will call you within 1 business day to help schedule your appointment, or you may contact the  Representative at:    All areas ~ [phone]     Type of Referral : [facility]       Timeframe requested: 1 - 2 days    Coverage of these services is subject to the terms and limitations of your health insurance plan.  Please call member services at your health plan with any benefit or coverage questions.      If X-rays, CT or MRI's have been performed, please contact the facility where they were done to arrange for , prior to your scheduled appointment.  Please bring this referral request to your appointment and present it to your specialist.                  Your next 10 appointments already scheduled     Mar 22, 2017  1:30 PM CDT   [facility]    [address] [place] [id number] [phone]            Mar 27, 2017  2:20 PM CDT   (Arrive by 2:05 PM)   Return Botox with Frederick Bender MD   [facility] ([email or web address] [place])    [address] [place] [address] [phone]            Mar 28, 2017 10:30 AM CDT   [facility]    [address] [place] [address] [phone]            Mar 31, 2017  2:00 PM CDT   (Arrive by 1:45 PM)   Return Visit with Austen Marquez MD   [facility] ([email or web address] [place])    [address] [place] [address]    [phone]            Apr 21, 2017 11:00 AM CDT   (Arrive by 10:45 AM)   Return Visit with EVI Vizcaino [facility] ([facility])    [address] [address] [phone]              Who to contact     If you have questions or need follow up information about today's clinic visit or your schedule please contact [facility] directly at [phone].  Normal or non-critical lab and imaging results will be communicated to you by Merlin Diamondshart, letter or phone within 4 business days after the clinic has received the results. If you do not hear from us within 7 days, please contact the clinic through Geelbet or phone. If you have a critical or abnormal lab result, we will notify you by phone as soon as possible.  Submit refill requests through oLyfe or call your pharmacy and they will forward the refill request to us. Please allow 3 business days for your refill to be completed.          Additional Information About Your Visit        [employer] Information     oLyfe gives you secure access to your electronic health record. If you see a primary care provider, you can also send messages to your care team and make appointments. If you have questions, please call your primary care clinic.  If you do not have a primary care provider, please call [phone] and they will assist you.        Care EveryWhere ID     This is your Care EveryWhere ID. This could be used by other organizations to access your [place] medical records  [id number]        Your Vitals Were     Pulse Temperature Pulse Oximetry BMI (Body Mass Index)          102 97.7  F (36.5  C) (Oral) 100% 27.23 kg/m2         Blood Pressure from Last 3 Encounters:   03/21/17 110/76   01/17/17 115/81   01/13/17 102/70    Weight from Last 3 Encounters:   03/21/17 153 lb 11.2 oz (69.7 kg)   01/17/17 153 lb (69.4 kg)   01/13/17 153 lb (69.4 kg)              We  Performed the Following     MED THERAPY MANAGE REFERRAL     ORTHO  REFERRAL          Today's Medication Changes          These changes are accurate as of: 3/21/17  3:01 PM.  If you have any questions, ask your nurse or doctor.               Start taking these medicines.        Dose/Directions    order for DME   Used for:  Left foot pain, Sprain of left ankle, unspecified ligament, initial encounter        Equipment being ordered: left boot for ankle and foot immobilization   Quantity:  1 Units   Refills:  0         These medicines have changed or have updated prescriptions.        Dose/Directions    * LORazepam 1 MG tablet   Commonly known as:  ATIVAN   This may have changed:  Another medication with the same name was added. Make sure you understand how and when to take each.   Used for:  Chronic abdominal pain        Dose:  1-2 mg   Take 1-2 tablets (1-2 mg) by mouth every 8 hours as needed for other (abdominal pain) Do not operate a vehicle after taking this medication   Quantity:  90 tablet   Refills:  0       * LORazepam 1 MG tablet   Commonly known as:  ATIVAN   This may have changed:  You were already taking a medication with the same name, and this prescription was added. Make sure you understand how and when to take each.   Used for:  Abdominal pain, generalized        Dose:  1 mg   Take 1 tablet (1 mg) by mouth every 8 hours as needed for anxiety   Quantity:  90 tablet   Refills:  0       * Notice:  This list has 2 medication(s) that are the same as other medications prescribed for you. Read the directions carefully, and ask your doctor or other care provider to review them with you.         Where to get your medicines      These medications were sent to [place] [address] [place] [address] [address]     Phone:  [phone]     ondansetron 4 MG ODT tab         Some of these will need a paper prescription and others can be bought over the  counter.  Ask your nurse if you have questions.     Bring a paper prescription for each of these medications     LORazepam 1 MG tablet    order for DME                Primary Care Provider Office Phone # Fax #    EVI Cardona [facility] [phone]       [facility] [address] [facility] [address]  [facility] [address]        Thank you!     Thank you for choosing [facility]  for your care. Our goal is always to provide you with excellent care. Hearing back from our patients is one way we can continue to improve our services. Please take a few minutes to complete the written survey that you may receive in the mail after your visit with us. Thank you!             Your Updated Medication List - Protect others around you: Learn how to safely use, store and throw away your medicines at www.disposemymeds.org.          This list is accurate as of: 3/21/17  3:01 PM.  Always use your most recent med list.                   Brand Name Dispense Instructions for use    albuterol 108 (90 BASE) MCG/ACT Inhaler    PROAIR HFA/PROVENTIL HFA/VENTOLIN HFA    1 Inhaler    Inhale 2 puffs into the lungs every 6 hours as needed for shortness of breath / dyspnea or wheezing       amitriptyline 25 MG tablet    ELAVIL    45 tablet    Take 1 tablet (25 mg) by mouth At Bedtime May increase to 2, if no benefit after 2 weeks.  Feel free to call / page the nurse for Dr. Mcmahon at [phone] / [phone] with questions regarding this order.       * apremilast 30 MG tablet    OTEZLA    60 tablet    Take 1 tablet (30 mg) by mouth 2 times daily       * Apremilast 10 & 20 & 30 MG Tbpk    OTEZLA    27 tablet    Take one tablet in the morning on day 1, then take one tablet every 12 hours on days 2 thru 14, according to the instructions on the packet.       betamethasone valerate 0.1 % cream    VALISONE     Apply topically 2 times daily       * botulinum toxin type A 100 UNITS injection    BOTOX    200 Units     Inject 200 Units into the muscle every 3 months       * BOTOX IJ      Inject 150 Units into the muscle once Lot: /C3 Exp: 05/2019       * BOTOX IJ      Inject 150 Units into the muscle Lot # /C3  Exp: 8/2019       carbamide peroxide 6.5 % otic solution    DEBROX     5 drops 2 times daily       clobetasol 0.05 % cream    TEMOVATE    60 g    Apply topically 2 times daily       Colchicine 0.6 MG Caps     90 capsule    Take 0.6 mg by mouth See Admin Instructions 2 capsules in AM and 1 capsule in PM       * dicyclomine 20 MG tablet    BENTYL    90 tablet    Take 1 tablet (20 mg) by mouth every 6 hours       * dicyclomine 10 MG capsule    BENTYL    120 capsule    Take 1-2 capsules (10-20 mg) by mouth 2 times daily as needed       EPINEPHrine 0.3 MG/0.3ML injection    EPIPEN    2 each    Inject 0.3 mLs into the muscle once as needed for anaphylaxis for 1 dose. And call 911.       EXCEDRIN MIGRAINE PO      Take by mouth as needed       guanFACINE 1 MG tablet    TENEX    180 tablet    3 tablets in the morning and 3 tablets in the evening       hyoscyamine 0.125 MG tablet    ANASPAZ/LEVSIN    40 tablet    Take 1-2 tablets (125-250 mcg) by mouth every 4 hours as needed for cramping       hypromellose 0.3 % Soln ophthalmic solution    GENTEAL     1 drop every hour as needed       LACTAID PO      Take by mouth daily       lidocaine 2 % topical gel    XYLOCAINE     Apply 1 Tube topically daily       lidocaine 5 % Patch    LIDODERM    30 patch    Apply up to 3 patches to painful area at once for up to 12 h within a 24 h period.  Remove after 12 hours.       lidocaine visc 2% 2.5mL/5mL & maalox/mylanta w/ simeth 2.5mL/5mL & diphenhydrAMINE 5mg/5mL Susp suspension    MAGIC Mouthwash    1 Bottle    Swish and swallow 10 mLs in mouth every 6 hours as needed for mouth sores       linaclotide 290 MCG capsule    LINZESS    30 capsule    Take 1 capsule (290 mcg) by mouth every morning (before breakfast)       * LORazepam 1 MG  tablet    ATIVAN    90 tablet    Take 1-2 tablets (1-2 mg) by mouth every 8 hours as needed for other (abdominal pain) Do not operate a vehicle after taking this medication       * LORazepam 1 MG tablet    ATIVAN    90 tablet    Take 1 tablet (1 mg) by mouth every 8 hours as needed for anxiety       lubiprostone 24 MCG capsule    AMITIZA    60 capsule    Take 1 capsule (24 mcg) by mouth 2 times daily (with meals)       meclizine 25 MG tablet    ANTIVERT    30 tablet    Take 1 tablet (25 mg) by mouth every 6 hours as needed for dizziness       metoclopramide 5 MG tablet    REGLAN    60 tablet    Take 1 tablet (5 mg) by mouth 2 times daily as needed       Multi-vitamin Tabs tablet      Take 1 tablet by mouth daily Reported on 3/21/2017       norgestimate-ethinyl estradiol 0.25-35 MG-MCG per tablet    ORTHO-CYCLEN, SPRINTEC    84 tablet    Take 1 tablet by mouth daily       omeprazole 40 MG capsule    priLOSEC    30 capsule    Take 1 capsule (40 mg) by mouth daily       ondansetron 4 MG ODT tab    ZOFRAN-ODT    120 tablet    Take 1 tablet (4 mg) by mouth 3 times daily as needed for nausea       order for DME     1 Units    Equipment being ordered: left boot for ankle and foot immobilization       polyethylene glycol powder    MIRALAX/GLYCOLAX     Take 17 g by mouth 2 times daily       promethazine 25 MG tablet    PHENERGAN    20 tablet    Take 0.5-1 tablets (12.5-25 mg) by mouth every 6 hours as needed for nausea       psyllium 63 % Powd    METAMUCIL SMOOTH TEXTURE    1 Bottle    Take 3 teaspoonful by mouth 3 times daily Mix in 8 ounces of water       RANITIDINE 75 PO      Take  by mouth. As needed for GI upset       sucralfate 1 GM/10ML suspension    CARAFATE    1200 mL    Take 10 mLs (1 g) by mouth 4 times daily       SUMAtriptan 100 MG tablet    IMITREX    18 tablet    Take 1 tablet (100 mg) by mouth at onset of headache for migraine May repeat in 2 hours. Max 2 tablets/24 hours.       triamcinolone 0.1 % cream     KENALOG    15 g    Apply sparingly to oral ulcers three times daily for 14 days as needed.       UNABLE TO FIND      daily MEDICATION NAME: Peppermint supplement       VITAMIN B6 PO          * Notice:  This list has 9 medication(s) that are the same as other medications prescribed for you. Read the directions carefully, and ask your doctor or other care provider to review them with you.

## 2017-03-21 NOTE — TELEPHONE ENCOUNTER
Called and spoke with pt regarding Otezla, she does not consistently take it with food, so she is going to try taking it with food prior to taking her pill.  She will do this for the next several days and I will call her to see how it is going.  Pt did not vomit today after taking medication in the morning.     Lori Miner RN  R[facility]umatology Clinic

## 2017-03-21 NOTE — NURSING NOTE
"Chief Complaint   Patient presents with     RECHECK       Initial /76 (BP Location: Right arm, Patient Position: Chair, Cuff Size: Adult Regular)  Pulse 102  Temp 97.7  F (36.5  C) (Oral)  Wt 153 lb 11.2 oz (69.7 kg)  SpO2 100%  BMI 27.23 kg/m2 Estimated body mass index is 27.23 kg/(m^2) as calculated from the following:    Height as of 1/13/17: 5' 3\" (1.6 m).    Weight as of this encounter: 153 lb 11.2 oz (69.7 kg).  Medication Reconciliation: complete     Lupe Schwarz CMA    "
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
## 2017-03-22 ENCOUNTER — OFFICE VISIT (OUTPATIENT)
Dept: BEHAVIORAL HEALTH | Facility: CLINIC | Age: 22
End: 2017-03-22
Payer: COMMERCIAL

## 2017-03-22 DIAGNOSIS — F33.1 MODERATE EPISODE OF RECURRENT MAJOR DEPRESSIVE DISORDER (H): Primary | ICD-10-CM

## 2017-03-22 DIAGNOSIS — F41.1 GAD (GENERALIZED ANXIETY DISORDER): ICD-10-CM

## 2017-03-22 PROCEDURE — 90834 PSYTX W PT 45 MINUTES: CPT | Performed by: SOCIAL WORKER

## 2017-03-22 NOTE — MR AVS SNAPSHOT
MRN:[id number]                      After Visit Summary   3/22/2017    Samara Oropeza    MRN: [id number]           Visit Information        Provider Department      3/22/2017 1:30 PM Layla Browne LICSW [facility] Generic      Your next 10 appointments already scheduled     Mar 27, 2017  2:20 PM CDT   (Arrive by 2:05 PM)   Return Botox with Frederick Bender MD   [facility] Medicine [facility] ([facility])    [address] [place] [id number] [phone]            Mar 28, 2017 10:30 AM CDT   Return Visit with ALISA Burt   [facility] ([facility])    [address] [address] [phone]            Mar 28, 2017  1:00 PM CDT   New Visit with Andres Johnson DPM   [facility] PODIATRY ([facility] [place])    [address] [address]  [address]  [facility] [address]   [phone]            Mar 31, 2017  2:00 PM CDT   (Arrive by 1:45 PM)   Return Visit with Austen Marquez MD   [facility] Rheumatology ([facility])    [address] [place] [id number] [phone]            Apr 21, 2017 11:00 AM CDT   (Arrive by 10:45 AM)   Return Visit with EVI Vizcaino [facility] ([facility])    [address] [place] [id number] [phone]              MyChart Information     [employer] gives you secure access to your electronic health record. If you see a primary care provider, you can also send messages to your care team and make appointments. If you have questions, please call your primary care clinic.  If you do not have a primary care provider, please call [phone] and they will assist you.        Care EveryWhere ID      This is your Care EveryWhere ID. This could be used by other organizations to access your [facility] medical records  [id number]

## 2017-03-22 NOTE — PROGRESS NOTES
Progress Note    Client Name: Samara Oropeza  Date: 3/22/2017          Service Type: Individual      Session Start Time: 1:30  Session End Time: 2:25      Session Length: 45     Session #: 3     Attendees: Client attended alone    Treatment Plan Last Reviewed: 3/14/2017   PHQ-9 / TAHIR-7 : 3/8/16     DATA      Progress Since Last Session (Related to Symptoms / Goals / Homework):   Symptoms: Stable    Homework: Achieved / completed to satisfaction- monitored her sleep, continues to struggle with nightmares and flashback      Episode of Care Goals: Satisfactory progress - ACTION (Actively working towards change); Intervened by reinforcing change plan / affirming steps taken     Current / Ongoing Stressors and Concerns:   Client states that she one night, last night, where she got about seven hours of sleep.  Otherwise, sleep continues to be disturbed.  Processed through stressors at home and the impact this has on her health.  Having a flareup with her Behcet disease.       Treatment Objective(s) Addressed in This Session:   use cognitive strategies identified in therapy to challenge anxious thoughts  Increase restful sleep     Intervention:   CBT: Introduced cognitive Distortions; Mindfulness        ASSESSMENT: Current Emotional / Mental Status (status of significant symptoms):   Risk status (Self / Other harm or suicidal ideation)   Client denies current fears or concerns for personal safety.   Client denies current or recent suicidal ideation or behaviors.   Client denies current or recent homicidal ideation or behaviors.   Client denies current or recent self injurious behavior or ideation.   Client denies other safety concerns.   A safety and risk management plan has not been developed at this time, however client was given the after-hours number / 911 should there be a change in any of these risk factors.     Appearance:   Appropriate    Eye Contact:   Fair     Psychomotor Behavior: Normal    Attitude:   Cooperative    Orientation:   All   Speech    Rate / Production: Monotone     Volume:  Soft    Mood:    Anxious    Affect:    Flat    Thought Content:  Clear    Thought Form:  Coherent  Logical    Insight:    Fair      Medication Review:   No current psychiatric medications prescribed     Medication Compliance:   NA     Changes in Health Issues:   Yes: Pain, Associated Psychological Distress- at 6/10. Had some follow-up with her PMD- has PT, neurology, podiatry, rheumatology, and PharmD     Chemical Use Review:   Substance Use: Chemical use reviewed, no active concerns identified      Tobacco Use: No current tobacco use.       Collateral Reports Completed:   Not Applicable    PLAN: (Client Tasks / Therapist Tasks / Other)  Homework:      1.  Read Cognitive Distortions worksheet and [place] the one that you do.    2.  Keep using the mindfulness for sleep (13 minute Body Scan) from website:  http://[email or web address]/mindful-meditations    3.  Put limits for her work.        Layla Browne, LICSW                                                         ________________________________________________________________________    Treatment Plan    Client's Name: Samara Oropeza  YOB: 1995    Date: 3/14/2017     DSM-V Diagnoses: 296.32 Major Depressive Disorder, Recurrent Episode, Moderate _ or 300.02 (F41.1) Generalized Anxiety Disorder  Psychosocial / Contextual Factors: Moderate- Minimal contact with family, Health issues  WHODAS: 30    Referral / Collaboration:  Referral to another professional/service is not indicated at this time..    Anticipated number of session or this episode of care: 12      MeasurableTreatment Goal(s) related to diagnosis / functional impairment(s)  Goal 1: Client will decrease anxiety symptoms as eveidenced by self-report and TAHIR-7 scores, currently at 14, goal to 7 or below    I will know I've met my goal when I am  better able to deal with it.      Objective #A (Client Action)    Client will use relaxation strategies 3x times per day to reduce the physical symptoms of anxiety.  Status: New - Date: 3/14/17     Intervention(s)  Therapist will teach different relaxation strategies and have client complete one between session.    Objective #B  Client will use cognitive strategies identified in therapy to challenge anxious thoughts.  Status: New - Date: 3/14/17     Intervention(s)  Therapist will 1.  introduce cognitive distortions; 2. build awareness for client; 3. leanr an implement diffferent cognitive restructuring techniques.    Objective #C  Client will increase her amount of restful sleep.  Status: New - Date: 3/14/17     Intervention(s)  Therapist will 1. start a sleep journal; 2. Introduce techniques for falling and staying asleep.        Client has reviewed and agreed to the above plan.      Layla Browne, [facility]  March 14, 2017

## 2017-03-24 NOTE — TELEPHONE ENCOUNTER
Taking medication right after she eats at this time.  Nausea has improved, but still noticeable.  No further vomiting.  Continuing to take 15 mg Otezla BID.  Oral/Nasal ulcers are starting to improved.      Pt is fine with continuing on Otezla at 15 mg BID.  Pt will come in for appt next Friday.    Lori Miner RN  [facility]

## 2017-03-27 ENCOUNTER — OFFICE VISIT (OUTPATIENT)
Dept: PHYSICAL MEDICINE AND REHAB | Facility: CLINIC | Age: 22
End: 2017-03-27

## 2017-03-27 VITALS
BODY MASS INDEX: 27.11 KG/M2 | DIASTOLIC BLOOD PRESSURE: 78 MMHG | SYSTOLIC BLOOD PRESSURE: 131 MMHG | WEIGHT: 153 LBS | HEART RATE: 104 BPM | HEIGHT: 63 IN

## 2017-03-27 DIAGNOSIS — G24.1 DYSTONIA, TORSION, FRAGMENTS OF: ICD-10-CM

## 2017-03-27 DIAGNOSIS — G24.3 SPASMODIC TORTICOLLIS: Primary | ICD-10-CM

## 2017-03-27 ASSESSMENT — PAIN SCALES - GENERAL: PAINLEVEL: MODERATE PAIN (5)

## 2017-03-27 NOTE — LETTER
"3/27/2017       RE: Samara Oropeza  [address]  [facility] [address]     Dear Colleague,    Thank you for referring your patient, Samara Oropeza, to the [facility] PHYSICAL MEDICINE AND REHABILITATION at [facility] Please see a copy of my visit note below.    BOTULINUM TOXIN PROCEDURE NOTE    Chief Complaint   Patient presents with     RECHECK     CERVICAL DYSTONIA       /78  Pulse 104  Ht 1.6 m (5' 3\")  Wt 69.4 kg (153 lb)  BMI 27.1 kg/m2      Current Outpatient Prescriptions:      order for DME, Equipment being ordered: left boot for ankle and foot immobilization, Disp: 1 Units, Rfl: 0     LORazepam (ATIVAN) 1 MG tablet, Take 1 tablet (1 mg) by mouth every 8 hours as needed for anxiety, Disp: 90 tablet, Rfl: 0     ondansetron (ZOFRAN-ODT) 4 MG ODT tab, Take 1 tablet (4 mg) by mouth 3 times daily as needed for nausea, Disp: 120 tablet, Rfl: 3     guanFACINE (TENEX) 1 MG tablet, 3 tablets in the morning and 3 tablets in the evening, Disp: 180 tablet, Rfl: 3     norgestimate-ethinyl estradiol (ORTHO-CYCLEN, SPRINTEC) 0.25-35 MG-MCG per tablet, Take 1 tablet by mouth daily, Disp: 84 tablet, Rfl: 3     LORazepam (ATIVAN) 1 MG tablet, Take 1-2 tablets (1-2 mg) by mouth every 8 hours as needed for other (abdominal pain) Do not operate a vehicle after taking this medication, Disp: 90 tablet, Rfl: 0     lubiprostone (AMITIZA) 24 MCG capsule, Take 1 capsule (24 mcg) by mouth 2 times daily (with meals), Disp: 60 capsule, Rfl: 3     Colchicine 0.6 MG CAPS, Take 0.6 mg by mouth See Admin Instructions 2 capsules in AM and 1 capsule in PM, Disp: 90 capsule, Rfl: 3     albuterol (PROAIR HFA/PROVENTIL HFA/VENTOLIN HFA) 108 (90 BASE) MCG/ACT Inhaler, Inhale 2 puffs into the lungs every 6 hours as needed for shortness of breath / dyspnea or wheezing, Disp: 1 Inhaler, Rfl: 1     dicyclomine (BENTYL) 10 MG capsule, Take 1-2 capsules (10-20 mg) by mouth 2 times daily as " needed, Disp: 120 capsule, Rfl: 3     amitriptyline (ELAVIL) 25 MG tablet, Take 1 tablet (25 mg) by mouth At Bedtime May increase to 2, if no benefit after 2 weeks.  Feel free to call / page the nurse for Dr. Mcmahon at [phone] / [phone] with questions regarding this order., Disp: 45 tablet, Rfl: 3     OnabotulinumtoxinA (BOTOX IJ), Inject 150 Units into the muscle Lot # /C3  Exp: 8/2019, Disp: , Rfl:      SUMAtriptan (IMITREX) 100 MG tablet, Take 1 tablet (100 mg) by mouth at onset of headache for migraine May repeat in 2 hours. Max 2 tablets/24 hours., Disp: 18 tablet, Rfl: 3     promethazine (PHENERGAN) 25 MG tablet, Take 0.5-1 tablets (12.5-25 mg) by mouth every 6 hours as needed for nausea, Disp: 20 tablet, Rfl: 1     sucralfate (CARAFATE) 1 GM/10ML suspension, Take 10 mLs (1 g) by mouth 4 times daily, Disp: 1200 mL, Rfl: 2     metoclopramide (REGLAN) 5 MG tablet, Take 1 tablet (5 mg) by mouth 2 times daily as needed, Disp: 60 tablet, Rfl: 2     meclizine (ANTIVERT) 25 MG tablet, Take 1 tablet (25 mg) by mouth every 6 hours as needed for dizziness, Disp: 30 tablet, Rfl: 1     apremilast (OTEZLA) 30 MG tablet, Take 1 tablet (30 mg) by mouth 2 times daily, Disp: 60 tablet, Rfl: 3     Apremilast (OTEZLA) 10 & 20 & 30 MG TBPK, Take one tablet in the morning on day 1, then take one tablet every 12 hours on days 2 thru 14, according to the instructions on the packet., Disp: 27 tablet, Rfl: 0     Magic Mouthwash (FV std formula) lidocaine visc 2% 2.5mL/5mL & maalox/mylanta w/ simeth 2.5mL/5mL & diphenhydrAMINE 5mg/5mL, Swish and swallow 10 mLs in mouth every 6 hours as needed for mouth sores, Disp: 1 Bottle, Rfl: 1     clobetasol (TEMOVATE) 0.05 % cream, Apply topically 2 times daily, Disp: 60 g, Rfl: 0     OnabotulinumtoxinA (BOTOX IJ), Inject 150 Units into the muscle once Lot: /C3 Exp: 05/2019, Disp: , Rfl:      botulinum toxin type A (BOTOX) 100 UNITS injection, Inject 200 Units into the muscle  every 3 months, Disp: 200 Units, Rfl: 3     lidocaine (LIDODERM) 5 % patch, Apply up to 3 patches to painful area at once for up to 12 h within a 24 h period.  Remove after 12 hours., Disp: 30 patch, Rfl: 1     omeprazole (PRILOSEC) 40 MG capsule, Take 1 capsule (40 mg) by mouth daily, Disp: 30 capsule, Rfl: 9     linaclotide (LINZESS) 290 MCG capsule, Take 1 capsule (290 mcg) by mouth every morning (before breakfast), Disp: 30 capsule, Rfl: 1     UNABLE TO FIND, daily MEDICATION NAME: Peppermint supplement, Disp: , Rfl:      hyoscyamine (ANASPAZ,LEVSIN) 0.125 MG tablet, Take 1-2 tablets (125-250 mcg) by mouth every 4 hours as needed for cramping, Disp: 40 tablet, Rfl: 1     Aspirin-Acetaminophen-Caffeine (EXCEDRIN MIGRAINE PO), Take by mouth as needed , Disp: , Rfl:      hypromellose (GENTEAL) 0.3 % SOLN, 1 drop every hour as needed, Disp: , Rfl:      betamethasone valerate (VALISONE) 0.1 % cream, Apply topically 2 times daily, Disp: , Rfl:      carbamide peroxide (DEBROX) 6.5 % otic solution, 5 drops 2 times daily, Disp: , Rfl:      psyllium (METAMUCIL SMOOTH TEXTURE) 63 % POWD, Take 3 teaspoonful by mouth 3 times daily Mix in 8 ounces of water, Disp: 1 Bottle, Rfl: 11     polyethylene glycol (MIRALAX/GLYCOLAX) powder, Take 17 g by mouth 2 times daily, Disp: , Rfl:      Lactase (LACTAID PO), Take by mouth daily, Disp: , Rfl:      multivitamin, therapeutic with minerals (MULTI-VITAMIN) TABS, Take 1 tablet by mouth daily Reported on 3/21/2017, Disp: , Rfl:      Pyridoxine HCl (VITAMIN B6 PO), , Disp: , Rfl:      dicyclomine (BENTYL) 20 MG tablet, Take 1 tablet (20 mg) by mouth every 6 hours, Disp: 90 tablet, Rfl: 1     lidocaine (XYLOCAINE) 2 % jelly, Apply 1 Tube topically daily, Disp: , Rfl:      triamcinolone (KENALOG) 0.1 % cream, Apply sparingly to oral ulcers three times daily for 14 days as needed., Disp: 15 g, Rfl: 1     EPINEPHrine (EPIPEN) 0.3 MG/0.3ML injection, Inject 0.3 mLs into the muscle once as  needed for anaphylaxis for 1 dose. And call 911., Disp: 2 each, Rfl: 1     Ranitidine HCl (RANITIDINE 75 PO), Take  by mouth. As needed for GI upset, Disp: , Rfl:      Allergies   Allergen Reactions     Amoxil [Penicillins] Rash     Dad unsure of reaction.     Contrast Dye Rash     Contrast [facility] [place] [place] Contrast [facility] [place]09[place].  NOTE: this is a contrast media oral with iodine. Premedicate with methylpred standard for IV contrast, request barium contrast for oral contrast.     Kiwi Swelling     Orange Fruit [Citrus] Anaphylaxis     Pineapple Anaphylaxis, Difficulty breathing and Rash     Milk Protein Extract Hives     Reglan [Metoclopramide] Other (See Comments)     IV dose only, in ER, rapid heart rate.     Ace Inhibitors      Difficulty in breathing and GI upset     Amitiza [Lubiprostone] Nausea and Vomiting     Amoxicillin-Pot Clavulanate      Latex      Midazolam Unknown     parent states that when pt takes this medication, she wakes up being very violent .     Versed      Coming out of pelvic exam at age of 5, was kicking and screaming when coming out of the versed.     Adhesive Tape Rash     Azithromycin Hives and Rash     Cephalexin Itching and Rash     Itchy mouth     Keflex [Cephalexin-Fd&C Yellow #6] Hives        PHYSICAL EXAM:    Shoulders rolled forward.    Excessive tone at left shoulder.  C/O excessive pain at left neck and shoulder today. Head pulls to left.   Complains of intense migraine headache today and more migraine headaches over the last couple of weeks.  Also has lower grade headaches throughout the day.  States she does have improvement with muscle pain, tics, and jerking movements with Botox.      We reviewed the recommended safety guidelines for  Botox from any vaccine injection, such as the seasonal flu vaccine, by a minimum of 10-14 days with Samara Oropeza. She acknowledged understanding.    HPI:    Patient reports the following new  "medical problems since last visit: left foot/ankle pain with suspected torn ligaments, seeing podiatry tomorrow. In CAM boot today.  Pt reports episodes of \"blacking out\" and has been to urgent care.      We reviewed the recommended safety guidelines for  Botox from any vaccine injection, such as the seasonal flu vaccine, by a minimum of 10-14 days with Samara Oropeza. She acknowledged understanding.    RESPONSE TO PREVIOUS TREATMENT:    Samara Oropeza received 150 units of Botox on 17.    Problems following the previous series of neurotoxin injections included:  Muscle weakness:  Rated as 'Moderate' severity. In shoulders and had hard time extending head for 1.5 months. Forehead weakness for 1 month.     BENEFITS BY PATIENT REPORT:    Pain and dystonia improvement reported as 30% for about 4 weeks.    BOTULINUM NEUROTOXIN INJECTION PROCEDURES:    VERIFICATION OF PATIENT IDENTIFICATION AND PROCEDURE     Initials   Patient Name [facility]   Patient  [facility]   Procedure Verified by: [facility]     Prior to the start of the procedure and with procedural staff participation, I verbally confirmed the patient s identity using two indicators, relevant allergies, that the procedure was appropriate and matched the consent or emergent situation, and that the correct equipment/implants were available. Immediately prior to starting the procedure I conducted the Time Out with the procedural staff and re-confirmed the patient s name, procedure, and site/side. (The Joint Commission universal protocol was followed.)  Yes    Sedation (Moderate or Deep): None      Above assessments performed by:  Resident/Fellow         Sugey Herbert, DO          The attending provider was present for the entire procedure documented below.      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     Her baseline symptoms have been recalcitrant to oral medications and conservative therapy.  She is here today for an injection of Botox.      GOAL OF PROCEDURE:  The goal of this procedure is to decrease pain  associated with dystonic movements.    TOTAL DOSE ADMINISTERED:  Dose Administered:  162.5 units Botox    Diluent Used:  0.25% Sensorcaine  Total Volume of Diluent Used:  1.63 ml  Lot # /C3 with Expiration Date:  10/2019  NDC #: Botox 100u ([address]-94)    Medication guide was offered to patient and was declined.    CONSENT:  The risks, benefits, and treatment options were discussed with Samara Oropeza and she agreed to proceed.      Written consent was obtained by [facility].     EQUIPMENT USED:  Needle-37mm stimulating/recording  EMG/NCS Machine    SKIN PREPARATION:  Skin preparation was performed using an alcohol wipe.    GUIDANCE DESCRIPTION:  Electro-myographic guidance was necessary throughout the administration of Botox to neck and shoulder muscles to accurately identify all areas of dystonic muscles while avoiding injection of non-dystonic muscles, neighboring nerves and nearby vascular structures.     AREA/MUSCLE INJECTED:  162.5 units of Botox dil 2:1 NS and 0.25% Sensorcaine  Right splenius - 5 units of Botox at 1 site  Left splenius - 5 units of Botox at 1 site    Right lateral trap - 10 units of Botox at 1 site  Left lateral trap - 10 units of Botox at 1 site    Right levator - 5 units of Botox at 1 site  Left levator - 5 units of Botox at 1 site    Right and left frontalis - 30 units of Botox at 6 sites  Bilateral procerus - 10 units of Botox at 2 sites   - 5 units of Botox at 1 site    Right and left temporalis - 77.5 units at 10 sites    RESPONSE TO PROCEDURE:  Samara Oropeza tolerated the procedure well and there were no immediate complications.  She was allowed to recover  for an appropriate period of time and was discharged home in stable condition.    FOLLOW UP:  Samara Oropeza was asked to follow up by phone in 7-14 days with Marcelle Richardson PT, Care Coordinator or Vidya Dickinson RN, Care Coordinator, to report her response to this series of injections.  Based on the patient's previous response to this therapy, Samara Oropeza was rescheduled for the next series of injections in 12 weeks.    PLAN (Medication Changes, Therapy Orders, Work or Disability Issues, etc.): Will monitor response to today's injections and report.

## 2017-03-27 NOTE — MR AVS SNAPSHOT
After Visit Summary   3/27/2017    Samara Oropeza    MRN: [id number]           Patient Information     Date Of Birth          1995        Visit Information        Provider Department      3/27/2017 2:20 PM Frederick Bender MD [facility] Physical Medicine and Rehabilitation        Today's Diagnoses     Spasmodic torticollis    -  1    Dystonia, torsion, fragments of           Follow-ups after your visit        Follow-up notes from your care team     Return in about 12 weeks (around 6/19/2017) for Spasmodic Torticollis.      Your next 10 appointments already scheduled     Mar 28, 2017 10:30 AM CDT   Return Visit with ALISA Burt   [facility] ([facility])    [address] [address] [phone]            Mar 28, 2017  1:00 PM CDT   New Visit with Andres Johnson DPM   [facility] ([facility])    [address]  [address]  [facility] [address]   [phone]            Mar 31, 2017  2:00 PM CDT   (Arrive by 1:45 PM)   Return Visit with Austen Marquez MD   [facility] Rheumatology ([facility] [place])    [address] [place] [address] [phone]            Apr 05, 2017  3:00 PM CDT   Office Visit with Angela Nova   [facility] ([facility])    [address] [place] [address] [phone]           Bring a current list of meds and any records pertaining to this visit.  For Physicals, please bring immunization records and any forms needing to be filled out.  Please arrive 10 minutes early to complete paperwork.            Apr 21, 2017 11:00 AM CDT   (Arrive by 10:45 AM)   Return Visit with EVI Vizcaino CNP   [facility] Gastroenterology and IBD ([facility])    [facility]  "[facility]  [address] [facility] [address]   [phone]            Jun 22, 2017  1:50 PM CDT   (Arrive by 1:35 PM)   Return Botox with Frederick Bender MD   [facility] Physical Medicine and Rehabilitation ([facility] [place])    [address] [facility]  [address] [facility] [address]   [phone]              Who to contact     Please call your clinic at [phone] to:    Ask questions about your health    Make or cancel appointments    Discuss your medicines    Learn about your test results    Speak to your doctor   If you have compliments or concerns about an experience at your clinic, or if you wish to file a complaint, please contact [facility] Patient Relations at [phone] or email us at [email or web address]         Additional Information About Your Visit        [employer] Information     [employer] gives you secure access to your electronic health record. If you see a primary care provider, you can also send messages to your care team and make appointments. If you have questions, please call your primary care clinic.  If you do not have a primary care provider, please call [phone] and they will assist you.      [employer] is an electronic gateway that provides easy, online access to your medical records. With [employer], you can request a clinic appointment, read your test results, renew a prescription or communicate with your care team.     To access your existing account, please contact your [facility] or call [phone] for assistance.        Care EveryWhere ID     This is your Care EveryWhere ID. This could be used by other organizations to access your [place] medical records  [id number]        Your Vitals Were     Pulse Height BMI (Body Mass Index)             104 1.6 m (5' 3\") 27.1 kg/m2          Blood Pressure from Last 3 Encounters:   03/27/17 131/78   03/21/17 110/76   01/17/17 115/81    " Weight from Last 3 Encounters:   03/27/17 69.4 kg (153 lb)   03/21/17 69.7 kg (153 lb 11.2 oz)   01/17/17 69.4 kg (153 lb)              We Performed the Following     HC CHEMODENERVATION 1 EXTREMITY, EA ADDL EXTREMITY, 1-4 MUSCLE     HC CHEMODENERVATION MUSCLE NECK UNILAT     HC CHEMODENERVATION ONE EXTREMITY, 1-4 MUSCLE     Needle EMG Guide w/Chemodenervation (73629)        Primary Care Provider Office Phone # Fax #    EVI Cardona [phone]       [facility] [address]  [facility] [address]        Thank you!     Thank you for choosing [facility] PHYSICAL MEDICINE AND REHABILITATION  for your care. Our goal is always to provide you with excellent care. Hearing back from our patients is one way we can continue to improve our services. Please take a few minutes to complete the written survey that you may receive in the mail after your visit with us. Thank you!             Your Updated Medication List - Protect others around you: Learn how to safely use, store and throw away your medicines at www.disposemymeds.org.          This list is accurate as of: 3/27/17  4:14 PM.  Always use your most recent med list.                   Brand Name Dispense Instructions for use    albuterol 108 (90 BASE) MCG/ACT Inhaler    PROAIR HFA/PROVENTIL HFA/VENTOLIN HFA    1 Inhaler    Inhale 2 puffs into the lungs every 6 hours as needed for shortness of breath / dyspnea or wheezing       amitriptyline 25 MG tablet    ELAVIL    45 tablet    Take 1 tablet (25 mg) by mouth At Bedtime May increase to 2, if no benefit after 2 weeks.  Feel free to call / page the nurse for Dr. Mcmahon at [phone] / [phone] with questions regarding this order.       * apremilast 30 MG tablet    OTEZLA    60 tablet    Take 1 tablet (30 mg) by mouth 2 times daily       * Apremilast 10 & 20 & 30 MG Tbpk    OTEZLA    27 tablet    Take one tablet in the morning on day 1, then take one tablet every 12 hours  on days 2 thru 14, according to the instructions on the packet.       betamethasone valerate 0.1 % cream    VALISONE     Apply topically 2 times daily       * botulinum toxin type A 100 UNITS injection    BOTOX    200 Units    Inject 200 Units into the muscle every 3 months       * BOTOX IJ      Inject 150 Units into the muscle once Lot: /C3 Exp: 05/2019       * BOTOX IJ      Inject 150 Units into the muscle Lot # /C3  Exp: 8/2019       * BOTOX IJ      Inject 162.5 Units as directed once Lot #: /C3 Exp: 10/2019       carbamide peroxide 6.5 % otic solution    DEBROX     5 drops 2 times daily       clobetasol 0.05 % cream    TEMOVATE    60 g    Apply topically 2 times daily       Colchicine 0.6 MG Caps     90 capsule    Take 0.6 mg by mouth See Admin Instructions 2 capsules in AM and 1 capsule in PM       * dicyclomine 20 MG tablet    BENTYL    90 tablet    Take 1 tablet (20 mg) by mouth every 6 hours       * dicyclomine 10 MG capsule    BENTYL    120 capsule    Take 1-2 capsules (10-20 mg) by mouth 2 times daily as needed       EPINEPHrine 0.3 MG/0.3ML injection    EPIPEN    2 each    Inject 0.3 mLs into the muscle once as needed for anaphylaxis for 1 dose. And call 911.       EXCEDRIN MIGRAINE PO      Take by mouth as needed       guanFACINE 1 MG tablet    TENEX    180 tablet    3 tablets in the morning and 3 tablets in the evening       hyoscyamine 0.125 MG tablet    ANASPAZ/LEVSIN    40 tablet    Take 1-2 tablets (125-250 mcg) by mouth every 4 hours as needed for cramping       hypromellose 0.3 % Soln ophthalmic solution    GENTEAL     1 drop every hour as needed       LACTAID PO      Take by mouth daily       lidocaine 2 % topical gel    XYLOCAINE     Apply 1 Tube topically daily       lidocaine 5 % Patch    LIDODERM    30 patch    Apply up to 3 patches to painful area at once for up to 12 h within a 24 h period.  Remove after 12 hours.       lidocaine visc 2% 2.5mL/5mL & maalox/mylanta w/ simeth  2.5mL/5mL & diphenhydrAMINE 5mg/5mL Susp suspension    MAGIC Mouthwash    1 Bottle    Swish and swallow 10 mLs in mouth every 6 hours as needed for mouth sores       linaclotide 290 MCG capsule    LINZESS    30 capsule    Take 1 capsule (290 mcg) by mouth every morning (before breakfast)       * LORazepam 1 MG tablet    ATIVAN    90 tablet    Take 1-2 tablets (1-2 mg) by mouth every 8 hours as needed for other (abdominal pain) Do not operate a vehicle after taking this medication       * LORazepam 1 MG tablet    ATIVAN    90 tablet    Take 1 tablet (1 mg) by mouth every 8 hours as needed for anxiety       lubiprostone 24 MCG capsule    AMITIZA    60 capsule    Take 1 capsule (24 mcg) by mouth 2 times daily (with meals)       meclizine 25 MG tablet    ANTIVERT    30 tablet    Take 1 tablet (25 mg) by mouth every 6 hours as needed for dizziness       metoclopramide 5 MG tablet    REGLAN    60 tablet    Take 1 tablet (5 mg) by mouth 2 times daily as needed       Multi-vitamin Tabs tablet      Take 1 tablet by mouth daily Reported on 3/21/2017       norgestimate-ethinyl estradiol 0.25-35 MG-MCG per tablet    ORTHO-CYCLEN, SPRINTEC    84 tablet    Take 1 tablet by mouth daily       omeprazole 40 MG capsule    priLOSEC    30 capsule    Take 1 capsule (40 mg) by mouth daily       ondansetron 4 MG ODT tab    ZOFRAN-ODT    120 tablet    Take 1 tablet (4 mg) by mouth 3 times daily as needed for nausea       order for DME     1 Units    Equipment being ordered: left boot for ankle and foot immobilization       polyethylene glycol powder    MIRALAX/GLYCOLAX     Take 17 g by mouth 2 times daily       promethazine 25 MG tablet    PHENERGAN    20 tablet    Take 0.5-1 tablets (12.5-25 mg) by mouth every 6 hours as needed for nausea       psyllium 63 % Powd    METAMUCIL SMOOTH TEXTURE    1 Bottle    Take 3 teaspoonful by mouth 3 times daily Mix in 8 ounces of water       RANITIDINE 75 PO      Take  by mouth. As needed for GI upset        sucralfate 1 GM/10ML suspension    CARAFATE    1200 mL    Take 10 mLs (1 g) by mouth 4 times daily       SUMAtriptan 100 MG tablet    IMITREX    18 tablet    Take 1 tablet (100 mg) by mouth at onset of headache for migraine May repeat in 2 hours. Max 2 tablets/24 hours.       triamcinolone 0.1 % cream    KENALOG    15 g    Apply sparingly to oral ulcers three times daily for 14 days as needed.       UNABLE TO FIND      daily MEDICATION NAME: Peppermint supplement       VITAMIN B6 PO          * Notice:  This list has 10 medication(s) that are the same as other medications prescribed for you. Read the directions carefully, and ask your doctor or other care provider to review them with you.

## 2017-03-27 NOTE — PROGRESS NOTES
"BOTULINUM TOXIN PROCEDURE NOTE    Chief Complaint   Patient presents with     RECHECK     CERVICAL DYSTONIA       /78  Pulse 104  Ht 1.6 m (5' 3\")  Wt 69.4 kg (153 lb)  BMI 27.1 kg/m2      Current Outpatient Prescriptions:      order for DME, Equipment being ordered: left boot for ankle and foot immobilization, Disp: 1 Units, Rfl: 0     LORazepam (ATIVAN) 1 MG tablet, Take 1 tablet (1 mg) by mouth every 8 hours as needed for anxiety, Disp: 90 tablet, Rfl: 0     ondansetron (ZOFRAN-ODT) 4 MG ODT tab, Take 1 tablet (4 mg) by mouth 3 times daily as needed for nausea, Disp: 120 tablet, Rfl: 3     guanFACINE (TENEX) 1 MG tablet, 3 tablets in the morning and 3 tablets in the evening, Disp: 180 tablet, Rfl: 3     norgestimate-ethinyl estradiol (ORTHO-CYCLEN, SPRINTEC) 0.25-35 MG-MCG per tablet, Take 1 tablet by mouth daily, Disp: 84 tablet, Rfl: 3     LORazepam (ATIVAN) 1 MG tablet, Take 1-2 tablets (1-2 mg) by mouth every 8 hours as needed for other (abdominal pain) Do not operate a vehicle after taking this medication, Disp: 90 tablet, Rfl: 0     lubiprostone (AMITIZA) 24 MCG capsule, Take 1 capsule (24 mcg) by mouth 2 times daily (with meals), Disp: 60 capsule, Rfl: 3     Colchicine 0.6 MG CAPS, Take 0.6 mg by mouth See Admin Instructions 2 capsules in AM and 1 capsule in PM, Disp: 90 capsule, Rfl: 3     albuterol (PROAIR HFA/PROVENTIL HFA/VENTOLIN HFA) 108 (90 BASE) MCG/ACT Inhaler, Inhale 2 puffs into the lungs every 6 hours as needed for shortness of breath / dyspnea or wheezing, Disp: 1 Inhaler, Rfl: 1     dicyclomine (BENTYL) 10 MG capsule, Take 1-2 capsules (10-20 mg) by mouth 2 times daily as needed, Disp: 120 capsule, Rfl: 3     amitriptyline (ELAVIL) 25 MG tablet, Take 1 tablet (25 mg) by mouth At Bedtime May increase to 2, if no benefit after 2 weeks.  Feel free to call / page the nurse for Dr. Mcmahon at [phone] / [phone] with questions regarding this order., Disp: 45 tablet, Rfl: 3     " OnabotulinumtoxinA (BOTOX IJ), Inject 150 Units into the muscle Lot # /C3  Exp: 8/2019, Disp: , Rfl:      SUMAtriptan (IMITREX) 100 MG tablet, Take 1 tablet (100 mg) by mouth at onset of headache for migraine May repeat in 2 hours. Max 2 tablets/24 hours., Disp: 18 tablet, Rfl: 3     promethazine (PHENERGAN) 25 MG tablet, Take 0.5-1 tablets (12.5-25 mg) by mouth every 6 hours as needed for nausea, Disp: 20 tablet, Rfl: 1     sucralfate (CARAFATE) 1 GM/10ML suspension, Take 10 mLs (1 g) by mouth 4 times daily, Disp: 1200 mL, Rfl: 2     metoclopramide (REGLAN) 5 MG tablet, Take 1 tablet (5 mg) by mouth 2 times daily as needed, Disp: 60 tablet, Rfl: 2     meclizine (ANTIVERT) 25 MG tablet, Take 1 tablet (25 mg) by mouth every 6 hours as needed for dizziness, Disp: 30 tablet, Rfl: 1     apremilast (OTEZLA) 30 MG tablet, Take 1 tablet (30 mg) by mouth 2 times daily, Disp: 60 tablet, Rfl: 3     Apremilast (OTEZLA) 10 & 20 & 30 MG TBPK, Take one tablet in the morning on day 1, then take one tablet every 12 hours on days 2 thru 14, according to the instructions on the packet., Disp: 27 tablet, Rfl: 0     Magic Mouthwash (FV std formula) lidocaine visc 2% 2.5mL/5mL & maalox/mylanta w/ simeth 2.5mL/5mL & diphenhydrAMINE 5mg/5mL, Swish and swallow 10 mLs in mouth every 6 hours as needed for mouth sores, Disp: 1 Bottle, Rfl: 1     clobetasol (TEMOVATE) 0.05 % cream, Apply topically 2 times daily, Disp: 60 g, Rfl: 0     OnabotulinumtoxinA (BOTOX IJ), Inject 150 Units into the muscle once Lot: /C3 Exp: 05/2019, Disp: , Rfl:      botulinum toxin type A (BOTOX) 100 UNITS injection, Inject 200 Units into the muscle every 3 months, Disp: 200 Units, Rfl: 3     lidocaine (LIDODERM) 5 % patch, Apply up to 3 patches to painful area at once for up to 12 h within a 24 h period.  Remove after 12 hours., Disp: 30 patch, Rfl: 1     omeprazole (PRILOSEC) 40 MG capsule, Take 1 capsule (40 mg) by mouth daily, Disp: 30 capsule, Rfl:  9     linaclotide (LINZESS) 290 MCG capsule, Take 1 capsule (290 mcg) by mouth every morning (before breakfast), Disp: 30 capsule, Rfl: 1     UNABLE TO FIND, daily MEDICATION NAME: Peppermint supplement, Disp: , Rfl:      hyoscyamine (ANASPAZ,LEVSIN) 0.125 MG tablet, Take 1-2 tablets (125-250 mcg) by mouth every 4 hours as needed for cramping, Disp: 40 tablet, Rfl: 1     Aspirin-Acetaminophen-Caffeine (EXCEDRIN MIGRAINE PO), Take by mouth as needed , Disp: , Rfl:      hypromellose (GENTEAL) 0.3 % SOLN, 1 drop every hour as needed, Disp: , Rfl:      betamethasone valerate (VALISONE) 0.1 % cream, Apply topically 2 times daily, Disp: , Rfl:      carbamide peroxide (DEBROX) 6.5 % otic solution, 5 drops 2 times daily, Disp: , Rfl:      psyllium (METAMUCIL SMOOTH TEXTURE) 63 % POWD, Take 3 teaspoonful by mouth 3 times daily Mix in 8 ounces of water, Disp: 1 Bottle, Rfl: 11     polyethylene glycol (MIRALAX/GLYCOLAX) powder, Take 17 g by mouth 2 times daily, Disp: , Rfl:      Lactase (LACTAID PO), Take by mouth daily, Disp: , Rfl:      multivitamin, therapeutic with minerals (MULTI-VITAMIN) TABS, Take 1 tablet by mouth daily Reported on 3/21/2017, Disp: , Rfl:      Pyridoxine HCl (VITAMIN B6 PO), , Disp: , Rfl:      dicyclomine (BENTYL) 20 MG tablet, Take 1 tablet (20 mg) by mouth every 6 hours, Disp: 90 tablet, Rfl: 1     lidocaine (XYLOCAINE) 2 % jelly, Apply 1 Tube topically daily, Disp: , Rfl:      triamcinolone (KENALOG) 0.1 % cream, Apply sparingly to oral ulcers three times daily for 14 days as needed., Disp: 15 g, Rfl: 1     EPINEPHrine (EPIPEN) 0.3 MG/0.3ML injection, Inject 0.3 mLs into the muscle once as needed for anaphylaxis for 1 dose. And call 911., Disp: 2 each, Rfl: 1     Ranitidine HCl (RANITIDINE 75 PO), Take  by mouth. As needed for GI upset, Disp: , Rfl:      Allergies   Allergen Reactions     Amoxil [Penicillins] Rash     Dad unsure of reaction.     Contrast Dye Rash     Contrast Media Ready-Box [facility],  "04/09/2014.; Contrast Media Ready-Box [facility]09[place]2014.  NOTE: this is a contrast media oral with iodine. Premedicate with methylpred standard for IV contrast, request barium contrast for oral contrast.     Kiwi Swelling     Orange Fruit [Citrus] Anaphylaxis     Pineapple Anaphylaxis, Difficulty breathing and Rash     Milk Protein Extract Hives     Reglan [Metoclopramide] Other (See Comments)     IV dose only, in ER, rapid heart rate.     Ace Inhibitors      Difficulty in breathing and GI upset     Amitiza [Lubiprostone] Nausea and Vomiting     Amoxicillin-Pot Clavulanate      Latex      Midazolam Unknown     parent states that when pt takes this medication, she wakes up being very violent .     Versed      Coming out of pelvic exam at age of 5, was kicking and screaming when coming out of the versed.     Adhesive Tape Rash     Azithromycin Hives and Rash     Cephalexin Itching and Rash     Itchy mouth     Keflex [Cephalexin-Fd&C Yellow #6] Hives        PHYSICAL EXAM:    Shoulders rolled forward.    Excessive tone at left shoulder.  C/O excessive pain at left neck and shoulder today. Head pulls to left.   Complains of intense migraine headache today and more migraine headaches over the last couple of weeks.  Also has lower grade headaches throughout the day.  States she does have improvement with muscle pain, tics, and jerking movements with Botox.      We reviewed the recommended safety guidelines for  Botox from any vaccine injection, such as the seasonal flu vaccine, by a minimum of 10-14 days with Samara Oropeza. She acknowledged understanding.    HPI:    Patient reports the following new medical problems since last visit: left foot/ankle pain with suspected torn ligaments, seeing podiatry tomorrow. In CAM boot today.  Pt reports episodes of \"blacking out\" and has been to urgent care.      We reviewed the recommended safety guidelines for  Botox from any vaccine injection, such as the " seasonal flu vaccine, by a minimum of 10-14 days with Samara Oropeza. She acknowledged understanding.    RESPONSE TO PREVIOUS TREATMENT:    Samara Oropeza received 150 units of Botox on 17.    Problems following the previous series of neurotoxin injections included:  Muscle weakness:  Rated as 'Moderate' severity. In shoulders and had hard time extending head for 1.5 months. Forehead weakness for 1 month.     BENEFITS BY PATIENT REPORT:    Pain and dystonia improvement reported as 30% for about 4 weeks.    BOTULINUM NEUROTOXIN INJECTION PROCEDURES:    VERIFICATION OF PATIENT IDENTIFICATION AND PROCEDURE     Initials   Patient Name [facility]   Patient  [facility]   Procedure Verified by: [facility]     Prior to the start of the procedure and with procedural staff participation, I verbally confirmed the patient s identity using two indicators, relevant allergies, that the procedure was appropriate and matched the consent or emergent situation, and that the correct equipment/implants were available. Immediately prior to starting the procedure I conducted the Time Out with the procedural staff and re-confirmed the patient s name, procedure, and site/side. (The Joint Commission universal protocol was followed.)  Yes    Sedation (Moderate or Deep): None      Above assessments performed by:  Resident/Fellow         Sugey Herbert DO          The attending provider was present for the entire procedure documented below.      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     Her baseline symptoms have been recalcitrant to oral medications and conservative therapy.  She is here today for an injection of Botox.      GOAL OF PROCEDURE:  The goal of this procedure is to decrease pain   associated with dystonic movements.    TOTAL DOSE ADMINISTERED:  Dose Administered:  162.5 units Botox    Diluent Used:  0.25% Sensorcaine  Total Volume of Diluent Used:  1.63 ml  Lot # /C3 with Expiration Date:  10/2019  NDC #: Botox 100u ([address]-85)    Medication guide was offered to patient and was declined.    CONSENT:  The risks, benefits, and treatment options were discussed with Samara Oropeza and she agreed to proceed.      Written consent was obtained by [facility].     EQUIPMENT USED:  Needle-37mm stimulating/recording  EMG/NCS Machine    SKIN PREPARATION:  Skin preparation was performed using an alcohol wipe.    GUIDANCE DESCRIPTION:  Electro-myographic guidance was necessary throughout the administration of Botox to neck and shoulder muscles to accurately identify all areas of dystonic muscles while avoiding injection of non-dystonic muscles, neighboring nerves and nearby vascular structures.     AREA/MUSCLE INJECTED:  162.5 units of Botox dil 2:1 NS and 0.25% Sensorcaine  Right splenius - 5 units of Botox at 1 site  Left splenius - 5 units of Botox at 1 site    Right lateral trap - 10 units of Botox at 1 site  Left lateral trap - 10 units of Botox at 1 site    Right levator - 5 units of Botox at 1 site  Left levator - 5 units of Botox at 1 site    Right and left frontalis - 30 units of Botox at 6 sites  Bilateral procerus - 10 units of Botox at 2 sites   - 5 units of Botox at 1 site    Right and left temporalis - 77.5 units at 10 sites    RESPONSE TO PROCEDURE:  Samara Oropeza tolerated the procedure well and there were no immediate complications.  She was allowed to recover for an appropriate period of time and was discharged home in stable condition.    FOLLOW UP:  Samara Oropeza was asked to follow up by phone in 7-14 days with Mracelle Richardosn PT, Care Coordinator or Vidya Dickinson RN, Care Coordinator, to report her response to this series of injections.  Based on the  patient's previous response to this therapy, Samara Robersonon was rescheduled for the next series of injections in 12 weeks.    PLAN (Medication Changes, Therapy Orders, Work or Disability Issues, etc.): Will monitor response to today's injections and report.

## 2017-03-28 ENCOUNTER — OFFICE VISIT (OUTPATIENT)
Dept: BEHAVIORAL HEALTH | Facility: CLINIC | Age: 22
End: 2017-03-28
Payer: COMMERCIAL

## 2017-03-28 ENCOUNTER — OFFICE VISIT (OUTPATIENT)
Dept: PODIATRY | Facility: CLINIC | Age: 22
End: 2017-03-28
Payer: COMMERCIAL

## 2017-03-28 VITALS — WEIGHT: 153 LBS | BODY MASS INDEX: 27.11 KG/M2 | HEART RATE: 98 BPM | HEIGHT: 63 IN

## 2017-03-28 DIAGNOSIS — F41.1 GAD (GENERALIZED ANXIETY DISORDER): ICD-10-CM

## 2017-03-28 DIAGNOSIS — F33.1 MODERATE EPISODE OF RECURRENT MAJOR DEPRESSIVE DISORDER (H): Primary | ICD-10-CM

## 2017-03-28 DIAGNOSIS — M25.572 ACUTE LEFT ANKLE PAIN: Primary | ICD-10-CM

## 2017-03-28 DIAGNOSIS — F43.10 PTSD (POST-TRAUMATIC STRESS DISORDER): ICD-10-CM

## 2017-03-28 DIAGNOSIS — M35.2 BEHCET'S DISEASE (H): ICD-10-CM

## 2017-03-28 DIAGNOSIS — S93.492A SPRAIN OF OTHER LIGAMENT OF LEFT ANKLE, INITIAL ENCOUNTER: ICD-10-CM

## 2017-03-28 PROBLEM — G24.3 CERVICAL DYSTONIA: Status: ACTIVE | Noted: 2017-03-28

## 2017-03-28 PROCEDURE — 99243 OFF/OP CNSLTJ NEW/EST LOW 30: CPT | Performed by: PODIATRIST

## 2017-03-28 PROCEDURE — 90834 PSYTX W PT 45 MINUTES: CPT | Performed by: SOCIAL WORKER

## 2017-03-28 RX ORDER — DEXAMETHASONE SODIUM PHOSPHATE 4 MG/ML
4 INJECTION, SOLUTION INTRA-ARTICULAR; INTRALESIONAL; INTRAMUSCULAR; INTRAVENOUS; SOFT TISSUE PRN
Qty: 30 ML | Refills: 0 | Status: SHIPPED | OUTPATIENT
Start: 2017-03-28 | End: 2017-08-16

## 2017-03-28 NOTE — Clinical Note
Good afternoon  I saw Samara today for follow up on left ankle sprain.  We'll have her transition from the CAM to an ankle brace, and she was referred to PT for aggressive functional rehab.  We'll see her back in 4 weeks and consider an MRI if minimal improvement is noted.  thanks  Andres

## 2017-03-28 NOTE — NURSING NOTE
"Chief Complaint   Patient presents with     Foot Problems     L ankle sprain from beginning of March, wearing a boot since Saturday, very painful       Initial Pulse 98  Ht 5' 3\" (1.6 m)  Wt 153 lb (69.4 kg)  BMI 27.1 kg/m2 Estimated body mass index is 27.1 kg/(m^2) as calculated from the following:    Height as of this encounter: 5' 3\" (1.6 m).    Weight as of this encounter: 153 lb (69.4 kg).  Medication Reconciliation: complete  "

## 2017-03-28 NOTE — PROGRESS NOTES
"Foot & Ankle Surgery  March 28, 2017    CC: L ankle injury    I was asked to see Samara Oropeza regarding the chief complaint by:  ERNESTO Abreu    HPI:  Pt is a 21 year old female who presents with above complaint.  approx 1 month ago, sustained inversion-type injury L ankle.  Blacks intermittently, had an episode and injured her ankle.  Believes it was an inversion type but is not positive.  Was seen at [facility], where xrays were read as neg per patient report(I cannot get access to Care Anywhere to find them).  She was seen by Josephine Abreu, and placed into a walking cast boot.  She continues to have pain, and states it occasionally feels like it's worse than ever.  Pain 6/10 \"on and off throughout the day\", worse with increased activities/walking.  She has tried ice, elevatoin, ACE bandage in addition to the walking boot.      ROS:   Pos for CC.  The patient denies current nausea, vomiting, chills, fevers, belly pain, calf pain, chest pain or SOB.  Complete remainder of ROS is otherwise neg.    VITALS:    Vitals:    03/28/17 1302   Pulse: 98   Weight: 153 lb (69.4 kg)   Height: 5' 3\" (1.6 m)       PMH:    Past Medical History:   Diagnosis Date     Anxiety      Behcet's disease (H)      Cervical adenitis May 2010     Chronic abdominal pain      Constipation, chronic 1994     Gastro-oesophageal reflux disease      Gastroparesis      Migraines      Palpitations      Syncope      Tourette's        SXHX:    Past Surgical History:   Procedure Laterality Date     ARTHROSCOPY KNEE WITH PATELLAR REALIGNMENT  7/25/2013    Procedure: ARTHROSCOPY KNEE WITH PATELLAR REALIGNMENT;  Left Knee Arthroscopy, Medial Patellofemoral Ligament Reconstruction with Allograft  ;  Surgeon: Jennifer Acevedo MD;  Location: [facility]     DENTAL SURGERY  1996    Teeth removal     ENDOSCOPY UPPER, COLONOSCOPY, COMBINED  2005     HC ESOPH/GAS REFLUX TEST W NASAL IMPED >1 HR N/A 2/15/2017    Procedure: ESOPHAGEAL IMPEDENCE FUNCTION TEST WITH 24 HOUR PH " GREATER THAN 1 HOUR;  Surgeon: Timothy Matta MD;  Location:  GI        MEDS:    Current Outpatient Prescriptions   Medication     OnabotulinumtoxinA (BOTOX IJ)     order for DME     LORazepam (ATIVAN) 1 MG tablet     ondansetron (ZOFRAN-ODT) 4 MG ODT tab     guanFACINE (TENEX) 1 MG tablet     norgestimate-ethinyl estradiol (ORTHO-CYCLEN, SPRINTEC) 0.25-35 MG-MCG per tablet     LORazepam (ATIVAN) 1 MG tablet     lubiprostone (AMITIZA) 24 MCG capsule     Colchicine 0.6 MG CAPS     albuterol (PROAIR HFA/PROVENTIL HFA/VENTOLIN HFA) 108 (90 BASE) MCG/ACT Inhaler     dicyclomine (BENTYL) 10 MG capsule     amitriptyline (ELAVIL) 25 MG tablet     OnabotulinumtoxinA (BOTOX IJ)     SUMAtriptan (IMITREX) 100 MG tablet     promethazine (PHENERGAN) 25 MG tablet     sucralfate (CARAFATE) 1 GM/10ML suspension     metoclopramide (REGLAN) 5 MG tablet     meclizine (ANTIVERT) 25 MG tablet     apremilast (OTEZLA) 30 MG tablet     Apremilast (OTEZLA) 10 & 20 & 30 MG TBPK     Magic Mouthwash (FV std formula) lidocaine visc 2% 2.5mL/5mL & maalox/mylanta w/ simeth 2.5mL/5mL & diphenhydrAMINE 5mg/5mL     clobetasol (TEMOVATE) 0.05 % cream     OnabotulinumtoxinA (BOTOX IJ)     botulinum toxin type A (BOTOX) 100 UNITS injection     lidocaine (LIDODERM) 5 % patch     omeprazole (PRILOSEC) 40 MG capsule     linaclotide (LINZESS) 290 MCG capsule     UNABLE TO FIND     hyoscyamine (ANASPAZ,LEVSIN) 0.125 MG tablet     Aspirin-Acetaminophen-Caffeine (EXCEDRIN MIGRAINE PO)     hypromellose (GENTEAL) 0.3 % SOLN     betamethasone valerate (VALISONE) 0.1 % cream     carbamide peroxide (DEBROX) 6.5 % otic solution     psyllium (METAMUCIL SMOOTH TEXTURE) 63 % POWD     polyethylene glycol (MIRALAX/GLYCOLAX) powder     Lactase (LACTAID PO)     multivitamin, therapeutic with minerals (MULTI-VITAMIN) TABS     Pyridoxine HCl (VITAMIN B6 PO)     dicyclomine (BENTYL) 20 MG tablet     lidocaine (XYLOCAINE) 2 % jelly     triamcinolone (KENALOG) 0.1 %  cream     EPINEPHrine (EPIPEN) 0.3 MG/0.3ML injection     Ranitidine HCl (RANITIDINE 75 PO)     No current facility-administered medications for this visit.        ALL:     Allergies   Allergen Reactions     Amoxil [Penicillins] Rash     Dad unsure of reaction.     Contrast Dye Rash     Contrast Media Ready-Box [facility] [place] [facility], 04/09/2014.; Contrast Media Ready-Box [facility], 04/09/2014.  NOTE: this is a contrast media oral with iodine. Premedicate with methylpred standard for IV contrast, request barium contrast for oral contrast.     Kiwi Swelling     Orange Fruit [Citrus] Anaphylaxis     Pineapple Anaphylaxis, Difficulty breathing and Rash     Milk Protein Extract Hives     Reglan [Metoclopramide] Other (See Comments)     IV dose only, in ER, rapid heart rate.     Ace Inhibitors      Difficulty in breathing and GI upset     Amitiza [Lubiprostone] Nausea and Vomiting     Amoxicillin-Pot Clavulanate      Latex      Midazolam Unknown     parent states that when pt takes this medication, she wakes up being very violent .     Versed      Coming out of pelvic exam at age of 5, was kicking and screaming when coming out of the versed.     Adhesive Tape Rash     Azithromycin Hives and Rash     Cephalexin Itching and Rash     Itchy mouth     Keflex [Cephalexin-Fd&C Yellow #6] Hives       FMH:    Family History   Problem Relation Age of Onset     Depression Mother      Neurologic Disorder Mother      Migraines, take imitrex injection.  Also in maternal grandmother.       Alcohol/Drug Father      Hypertension Father      Depression Father      Cardiovascular Maternal Grandmother      Depression Maternal Grandmother      Hypertension Maternal Grandmother      Alzheimer Disease Maternal Grandmother      Cardiovascular Maternal Grandfather      Hypertension Maternal Grandfather      Depression Maternal Grandfather      Alcohol/Drug Maternal Grandfather      Cardiovascular Paternal Grandmother      Hypertension Paternal Grandmother       Cardiovascular Paternal Grandfather      Hypertension Paternal Grandfather      Glaucoma No family hx of      Macular Degeneration No family hx of        SocHx:    Social History     Social History     Marital status: Single     Spouse name: N/A     Number of children: N/A     Years of education: N/A     Occupational History     Not on file.     Social History Main Topics     Smoking status: Never Smoker     Smokeless tobacco: Never Used     Alcohol use Yes      Comment: seldom     Drug use: No     Sexual activity: No     Other Topics Concern     Not on file     Social History Narrative           EXAMINATION:  Gen:   No apparent distress  Neuro:   A&Ox3, no deficits  Psych:    Answering questions appropriately for age and situation with normal affect  Head:    NCAT  Eye:    Visual scanning without deficit  Ear:    Response to auditory stimuli wnl  Lung:    Non-labored breathing on RA noted  Abd:    NTND per patient report  Lymph:    Edema around L ankle  Vasc:    Pulses palpable, CFT minimally delayed  Neuro:    Light touch sensation intact to all sensory nerve distributions without paresthesias  Derm:    Neg for nodules, lesions or ulcerations  MSK:    tib-fib compression shows some discomfort at the syndesmosis.  Very tender at ATFL and anterior gutter/medial soft space.  Minimal deltoid discomfort. Anterior drawer and talar tilt show pain but no instability, although patient appeared to be guarding.    Calf:    Neg for redness, swelling or tenderness    Assessment:  21 year old female with L ankle sprain and continued pain      Plan:  Discussed etiologies and options  1.  L ankle sprain  -transition from CAM to triloc(dispensed) as able  -RICE/NSAID prn  -tensogrip for edema control  -[facility] PT referral with dex Rx    Follow up:  4 weeks; MRI if minimal improvement.      Patient's medical history was reviewed today    Body mass index is 27.1 kg/(m^2).  Weight management plan: Patient was referred to their PCP to  discuss a diet and exercise plan.        Andres Johnson, CHRISTINAM   Podiatric Foot & Ankle Surgeon  [facility]  [phone]

## 2017-03-28 NOTE — MR AVS SNAPSHOT
MRN:[id number]                      After Visit Summary   3/28/2017    Samara Oropeza    MRN: [id number]           Visit Information        Provider Department      3/28/2017 10:30 AM Layla Browne LICSW [facility] Generic      Your next 10 appointments already scheduled     Mar 28, 2017  1:00 PM CDT   New Visit with Andres Johnson DPM   [facility] ([facility])    [address]  [address]  [facility] [address]   [phone]            Mar 31, 2017  2:00 PM CDT   (Arrive by 1:45 PM)   Return Visit with Austen Marquez MD   [facility] ([facility] [place])    [address] [place] [id number] [phone]            Apr 05, 2017  3:00 PM CDT   Office Visit with Angela Nova   [facility] ([facility])    [address] [place] [address] [phone]           Bring a current list of meds and any records pertaining to this visit.  For Physicals, please bring immunization records and any forms needing to be filled out.  Please arrive 10 minutes early to complete paperwork.            Apr 19, 2017  8:30 AM CDT   Return Visit with ALISA Burt   [facility] ([facility])    [address] [place] [id number] [phone]            Apr 21, 2017 11:00 AM CDT   (Arrive by 10:45 AM)   Return Visit with EVI Vizcaino CNP   [facility] and [facility] ([facility])    [address] [place] [id number] [phone]            Apr 24, 2017 11:30 AM CDT   Return Visit with ALISA Burt   [facility] ([facility])    [address] [place] [address]  [facility] [id number] [phone]            Jun 22, 2017  1:50 PM CDT   (Arrive by 1:35 PM)   Return Botox with Frederick Bender MD   [facility] ([facility] [place])    [address]  [address] [facility] [address] [phone]              MyChart Information     [email or web address] gives you secure access to your electronic health record. If you see a primary care provider, you can also send messages to your care team and make appointments. If you have questions, please call your primary care clinic.  If you do not have a primary care provider, please call [phone] and they will assist you.        Care EveryWhere ID     This is your Care EveryWhere ID. This could be used by other organizations to access your [place] medical records  [id number]
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
## 2017-03-28 NOTE — MR AVS SNAPSHOT
After Visit Summary   3/28/2017    Samara Oropeza    MRN: [id number]           Patient Information     Date Of Birth          1995        Visit Information        Provider Department      3/28/2017 1:00 PM Andres Johnson DPM [facility]        Today's Diagnoses     Acute left ankle pain    -  1    Sprain of other ligament of left ankle, initial encounter          Care Instructions      Dr. Johnson's [facility] Locations:         Monday Tuesday   [facility]   [address] [address] [address] [address], [address]   [place] [address] [place] [address]   [phone]       Wednesday:  Surgery Day    Surgery Scheduling Line - [phone]       Thursday Morning Thursday Afternoon   [facility]   [address] [facility] [address], [address]   [place] [address] [place], [place] [address] [phone]       Friday Morning To Schedule an Appointment    [facility] Call: [phone] [address] [place] [address]  [phone] PLEASE FAX ALL FORMS TO: [phone]     Follow up: 4 wks    ANKLE SPRAIN  An ankle sprain is an injury to one or more ligaments in the ankle, usually on the outside of the ankle. Ligaments are bands of tissue - like rubber bands - that connect one bone to another and bind the joints together. In the ankle joint, ligaments provide stability by limiting side-to-side movement.  Some ankle sprains are much worse than others. The severity of an ankle sprain depends on whether the ligament is stretched, partially torn, or completely torn, as well as on the number of ligaments involved. Ankle sprains are not the same as strains, which affect muscles rather than ligaments.  CAUSES  Sprained ankles often result from a fall, a sudden twist, or a blow that forces the ankle joint out of its normal position. Ankle sprains  commonly occur while participating in sports, wearing inappropriate shoes, or walking or running on an uneven surface.  Sometimes ankle sprains occur because of a person is born with weak ankles. Previous ankle or foot injuries can also weaken the ankle and lead to sprains.  SYMPTOMS  The symptoms of ankle sprains may include: pain or soreness, swelling, bruising, difficulty walking, and stiffness in the joint.  These symptoms may vary in intensity, depending on the severity of the sprain. Sometimes pain and swelling are absent in people with previous ankle sprains. Instead, they may simply feel the ankle is wobbly and unsteady when they walk. Even if there is no pain or swelling with a sprained ankle, treatment is crucial. Any ankle sprain - whether it s your first or your fifth - requires prompt medical attention.  DIAGNOSIS  In evaluating your injury, the foot and ankle surgeon will obtain a thorough history of your symptoms and examine your foot. X-rays or other advanced imaging studies may be ordered to help determine the severity of the injury.  MEDICAL TREATMENT  There are four key reasons why an ankle sprain should be promptly evaluated and treated by a foot and ankle surgeon:  An untreated ankle sprain may lead to chronic ankle instability, a condition marked by persistent discomfort and a  giving way  of the ankle. Weakness in the leg may also develop.   A more severe ankle injury may have occurred along with the sprain. This might include a serious bone fracture that, if left untreated, could lead to troubling complications.   An ankle sprain may be accompanied by a foot injury that causes discomfort but has gone unnoticed thus far.   Rehabilitation of a sprained ankle needs to begin right away. If rehabilitation is delayed, the injury may be less likely to heal properly.     NON-SURGICAL TREATMENT  When you have an ankle sprain, rehabilitation is crucial--and it starts the moment your treatment begins.  Your foot and ankle surgeon may recommend one or more of the following treatment options:  Rest. Stay off the injured ankle. Walking may cause further injury.   Ice. Apply an ice pack to the injured area, placing a thin towel between the ice and the skin. Use ice for 20 minutes and then wait at least 40 minutes before icing again.   Compression. An elastic wrap may be recommended to control swelling.   Elevation. The ankle should be raised slightly above the level of your heart to reduce swelling.   Early physical therapy. Your doctor will start you on a rehabilitation program as soon as possible to promote healing and increase your range of motion. This includes doing prescribed exercises.   Medications. Nonsteroidal anti-inflammatory drugs (NSAIDs), such as ibuprofen, may be recommended to reduce pain and inflammation. In some cases, prescription pain medications are needed to provide adequate relief.   SURGICAL TREATMENT  In more severe cases, surgery may be required to adequately treat an ankle sprain. Surgery often involves repairing the damaged ligament or ligaments. The foot and ankle surgeon will select the surgical procedure best suited for your case based on the type and severity of your injury as well as your activity level.  After surgery, rehabilitation is extremely important. Completing your rehabilitation program is crucial to a successful outcome. Be sure to continue to see your foot and ankle surgeon during this period to ensure that your ankle heals properly and function is restored.    PRICE THERAPY    Many aches and pains throughout the foot and ankle can be helped with many simple treatments. This is usually described as PRICE Therapy.      P - Protection - often times, inflammation/pain in the lower extremity is not able to improve simply because the areas involved are never allowed to rest. Every step we take can bother the problematic area. Protecting those areas is an important step in the  healing process. This may involve a walking cast boot, a special insert/orthotic device, an ankle brace, or simply avoiding barefoot walking.    R - Rest - in addition to protecting the foot/ankle, resting is an important, but often times difficult, treatment option. Getting off your feet when they bother you, and specifically avoiding activities that cause pain/discomfort, are very beneficial to prevent, and treat, foot/ankle pain.      I - Ice - icing regularly can help to decrease inflammation and swelling in the foot, thus decreasing pain. Using an ice pack or a bag of frozen veggies works very well. Ice for 20 minutes multiple times per day as needed.  Do not place the ice directly on the skin as this can cause tissue damage.    C - Compression - using a compression wrap or an ACE wrap can help to decrease swelling, which can help to decrease pain. Wearing the wraps is generally not needed at night, but they should be worn on a regular basis when you are going to be on your feet for prolonged periods as gravity tends to pull fluids down to your feet/ankles.    E - Elevation - elevating your lower extremities multiple times daily for 15-20 minutes can help to decrease swelling, which works well in decreasing pain levels.    NSAID/Tylenol - Anti-inflammatories like Aleve or ibuprofen, and/or a pain medication, such as Tylenol, can help to improve pain levels and get the issue resolved sooner rather than later. Anyone with liver issues should be careful with Tylenol, and anyone with high blood pressure or heart, stomach or kidney issues should be careful with anti-inflammatories. Please ask if you have questions about these medications, including dosage.          Body Mass Index (BMI)    Many things can cause foot and ankle problems. Foot structure, activity level, foot mechanics and injuries are common causes of pain.    One very important issue that often goes unmentioned, is body weight.  Extra weight can cause  increased stress on muscles, ligaments, bones and tendons. Sometimes just a few extra pounds is all it takes to put one over her/his threshold. Without reducing that stress, it can be difficult to alleviate pain.      Some people are uncomfortable addressing this issue, but we feel it is important for you to think about it. As Foot & Ankle specialists, our job is addressing the lower extremity problem and possible causes.     Regarding extra body weight, we encourage patients to discuss diet and weight management plans with their primary care doctors. It is this team approach that gives you the best opportunity for pain relief and getting you back on your feet.                Follow-ups after your visit        Additional Services     [facility] PT, HAND, AND CHIROPRACTIC REFERRAL       === This order will print in the [facility] Scheduling  Office ===    Physical therapy, hand therapy and chiropractic care are available through:    [facility]  [facility]    Call one easy number to schedule at any of the above locations:  [phone].    Your provider has referred you to Physical Therapy at [facility] or [facility]    Indication/Reason for Referral: Ankle Pain  Onset of Illness:  4 weeks, ankle sprain  Therapy Orders:  Evaluate and Treat  Special Programs:  None  Special Request:  Equipment: As Indicated:   Exercise: Active/Assistive ROM, Conditioning, Home Exercise Program, Passive ROM, Posture/Body Mechanics, Progressive Strengthening, Stretching/Flexibility and gait training  Manual Therapy: Joint Mobilization and Myofascial Release/Massage  Modalities: As Indicated: , Iontophoresis (Please Order: Dexamethasone Sodium Phosphate - 4mg/ml injectable, 30 cc total volume) and Ultrasound    Additional Comments for the therapist or chiropractor:  8 sessions    Please be aware that coverage of these services is subject to the terms and limitations of your health insurance plan.   Call member services at your health plan with any benefit or coverage questions.      Please bring the following to your appointment:    >>   Your personal calendar for scheduling future appointments  >>   Comfortable clothing                  Your next 10 appointments already scheduled     Mar 31, 2017  2:00 PM CDT   (Arrive by 1:45 PM)   Return Visit with Austen Marquez MD   [facility] ([facility])    [address] [place] [address]  [address] [facility] [id number] [phone]            Apr 05, 2017  3:00 PM CDT   Office Visit with Angela Nova   [facility] ([facility])    [address] [place] [address] [address] [place] [id number] [phone]           Bring a current list of meds and any records pertaining to this visit.  For Physicals, please bring immunization records and any forms needing to be filled out.  Please arrive 10 minutes early to complete paperwork.            Apr 19, 2017  8:30 AM CDT   Return Visit with ALISA Burt   [facility] ([facility])    [address] [place] [id number] [phone]            Apr 21, 2017 11:00 AM CDT   (Arrive by 10:45 AM)   Return Visit with EVI Vizcaino CNP   [facility]enterology and IBD ([facility])    [address] [place] [address]  [address] [facility] [address] [phone]            Apr 24, 2017 11:30 AM CDT   Return Visit with ALISA Burt   [facility] ([facility])    [address] [place] [id number] [phone]            Jun 22, 2017  1:50 PM CDT   (Arrive by 1:35 PM)   Return Botox with Frederick Bender MD   [facility] ([facility])    [address] [place] [address]  "[facility] [address] [phone]              Who to contact     If you have questions or need follow up information about today's clinic visit or your schedule please contact [facility] directly at [phone].  Normal or non-critical lab and imaging results will be communicated to you by ASYM IIIhart, letter or phone within 4 business days after the clinic has received the results. If you do not hear from us within 7 days, please contact the clinic through ASYM IIIhart or phone. If you have a critical or abnormal lab result, we will notify you by phone as soon as possible.  Submit refill requests through [employer] or call your pharmacy and they will forward the refill request to us. Please allow 3 business days for your refill to be completed.          Additional Information About Your Visit        ASYM IIIhart Information     [employer] gives you secure access to your electronic health record. If you see a primary care provider, you can also send messages to your care team and make appointments. If you have questions, please call your primary care clinic.  If you do not have a primary care provider, please call [phone] and they will assist you.        Care EveryWhere ID     This is your Care EveryWhere ID. This could be used by other organizations to access your [facility] medical records  [id number]        Your Vitals Were     Pulse Height BMI (Body Mass Index)             98 5' 3\" (1.6 m) 27.1 kg/m2          Blood Pressure from Last 3 Encounters:   03/27/17 131/78   03/21/17 110/76   01/17/17 115/81    Weight from Last 3 Encounters:   03/28/17 153 lb (69.4 kg)   03/27/17 153 lb (69.4 kg)   03/21/17 153 lb 11.2 oz (69.7 kg)              We Performed the Following     [facility] PT, HAND, AND CHIROPRACTIC REFERRAL          Today's Medication Changes          These changes are accurate as of: 3/28/17  1:19 PM.  If you have any questions, ask your nurse or doctor.               Start taking these medicines. "        Dose/Directions    dexamethasone 4 MG/ML injection   Commonly known as:  DECADRON   Used for:  Sprain of other ligament of left ankle, initial encounter   Started by:  Andres Johnson DPM        Dose:  4 mg   Apply 1 mL (4 mg) topically as needed   Quantity:  30 mL   Refills:  0            Where to get your medicines      These medications were sent to [facility] - [address], [facility], [facility] [address]     Phone:  [phone]     dexamethasone 4 MG/ML injection                Primary Care Provider Office Phone # Fax #    Sonja EVI Laguerre [phone]       [facility] [address]  [facility] [address]        Thank you!     Thank you for choosing [facility]  for your care. Our goal is always to provide you with excellent care. Hearing back from our patients is one way we can continue to improve our services. Please take a few minutes to complete the written survey that you may receive in the mail after your visit with us. Thank you!             Your Updated Medication List - Protect others around you: Learn how to safely use, store and throw away your medicines at www.disposemymeds.org.          This list is accurate as of: 3/28/17  1:19 PM.  Always use your most recent med list.                   Brand Name Dispense Instructions for use    albuterol 108 (90 BASE) MCG/ACT Inhaler    PROAIR HFA/PROVENTIL HFA/VENTOLIN HFA    1 Inhaler    Inhale 2 puffs into the lungs every 6 hours as needed for shortness of breath / dyspnea or wheezing       amitriptyline 25 MG tablet    ELAVIL    45 tablet    Take 1 tablet (25 mg) by mouth At Bedtime May increase to 2, if no benefit after 2 weeks.  Feel free to call / page the nurse for Dr. Mcmahon at [phone] / [phone] with questions regarding this order.       * apremilast 30 MG tablet    OTEZLA    60 tablet    Take 1 tablet  (30 mg) by mouth 2 times daily       * Apremilast 10 & 20 & 30 MG Tbpk    OTEZLA    27 tablet    Take one tablet in the morning on day 1, then take one tablet every 12 hours on days 2 thru 14, according to the instructions on the packet.       betamethasone valerate 0.1 % cream    VALISONE     Apply topically 2 times daily       * botulinum toxin type A 100 UNITS injection    BOTOX    200 Units    Inject 200 Units into the muscle every 3 months       * BOTOX IJ      Inject 150 Units into the muscle once Lot: /C3 Exp: 05/2019       * BOTOX IJ      Inject 150 Units into the muscle Lot # /C3  Exp: 8/2019       * BOTOX IJ      Inject 162.5 Units as directed once Lot #: /C3 Exp: 10/2019       carbamide peroxide 6.5 % otic solution    DEBROX     5 drops 2 times daily       clobetasol 0.05 % cream    TEMOVATE    60 g    Apply topically 2 times daily       Colchicine 0.6 MG Caps     90 capsule    Take 0.6 mg by mouth See Admin Instructions 2 capsules in AM and 1 capsule in PM       dexamethasone 4 MG/ML injection    DECADRON    30 mL    Apply 1 mL (4 mg) topically as needed       * dicyclomine 20 MG tablet    BENTYL    90 tablet    Take 1 tablet (20 mg) by mouth every 6 hours       * dicyclomine 10 MG capsule    BENTYL    120 capsule    Take 1-2 capsules (10-20 mg) by mouth 2 times daily as needed       EPINEPHrine 0.3 MG/0.3ML injection    EPIPEN    2 each    Inject 0.3 mLs into the muscle once as needed for anaphylaxis for 1 dose. And call 911.       EXCEDRIN MIGRAINE PO      Take by mouth as needed       guanFACINE 1 MG tablet    TENEX    180 tablet    3 tablets in the morning and 3 tablets in the evening       hyoscyamine 0.125 MG tablet    ANASPAZ/LEVSIN    40 tablet    Take 1-2 tablets (125-250 mcg) by mouth every 4 hours as needed for cramping       hypromellose 0.3 % Soln ophthalmic solution    GENTEAL     1 drop every hour as needed       LACTAID PO      Take by mouth daily       lidocaine 2 %  topical gel    XYLOCAINE     Apply 1 Tube topically daily       lidocaine 5 % Patch    LIDODERM    30 patch    Apply up to 3 patches to painful area at once for up to 12 h within a 24 h period.  Remove after 12 hours.       lidocaine visc 2% 2.5mL/5mL & maalox/mylanta w/ simeth 2.5mL/5mL & diphenhydrAMINE 5mg/5mL Susp suspension    MAGIC Mouthwash    1 Bottle    Swish and swallow 10 mLs in mouth every 6 hours as needed for mouth sores       linaclotide 290 MCG capsule    LINZESS    30 capsule    Take 1 capsule (290 mcg) by mouth every morning (before breakfast)       * LORazepam 1 MG tablet    ATIVAN    90 tablet    Take 1-2 tablets (1-2 mg) by mouth every 8 hours as needed for other (abdominal pain) Do not operate a vehicle after taking this medication       * LORazepam 1 MG tablet    ATIVAN    90 tablet    Take 1 tablet (1 mg) by mouth every 8 hours as needed for anxiety       lubiprostone 24 MCG capsule    AMITIZA    60 capsule    Take 1 capsule (24 mcg) by mouth 2 times daily (with meals)       meclizine 25 MG tablet    ANTIVERT    30 tablet    Take 1 tablet (25 mg) by mouth every 6 hours as needed for dizziness       metoclopramide 5 MG tablet    REGLAN    60 tablet    Take 1 tablet (5 mg) by mouth 2 times daily as needed       Multi-vitamin Tabs tablet      Take 1 tablet by mouth daily Reported on 3/21/2017       norgestimate-ethinyl estradiol 0.25-35 MG-MCG per tablet    ORTHO-CYCLEN, SPRINTEC    84 tablet    Take 1 tablet by mouth daily       omeprazole 40 MG capsule    priLOSEC    30 capsule    Take 1 capsule (40 mg) by mouth daily       ondansetron 4 MG ODT tab    ZOFRAN-ODT    120 tablet    Take 1 tablet (4 mg) by mouth 3 times daily as needed for nausea       order for DME     1 Units    Equipment being ordered: left boot for ankle and foot immobilization       polyethylene glycol powder    MIRALAX/GLYCOLAX     Take 17 g by mouth 2 times daily       promethazine 25 MG tablet    PHENERGAN    20 tablet     Take 0.5-1 tablets (12.5-25 mg) by mouth every 6 hours as needed for nausea       psyllium 63 % Powd    METAMUCIL SMOOTH TEXTURE    1 Bottle    Take 3 teaspoonful by mouth 3 times daily Mix in 8 ounces of water       RANITIDINE 75 PO      Take  by mouth. As needed for GI upset       sucralfate 1 GM/10ML suspension    CARAFATE    1200 mL    Take 10 mLs (1 g) by mouth 4 times daily       SUMAtriptan 100 MG tablet    IMITREX    18 tablet    Take 1 tablet (100 mg) by mouth at onset of headache for migraine May repeat in 2 hours. Max 2 tablets/24 hours.       triamcinolone 0.1 % cream    KENALOG    15 g    Apply sparingly to oral ulcers three times daily for 14 days as needed.       UNABLE TO FIND      daily MEDICATION NAME: Peppermint supplement       VITAMIN B6 PO          * Notice:  This list has 10 medication(s) that are the same as other medications prescribed for you. Read the directions carefully, and ask your doctor or other care provider to review them with you.

## 2017-03-28 NOTE — LETTER
"  3/28/2017       RE: Samara Oropeza  [address]  [facility] [address]           Dear Colleague,    Thank you for referring your patient, Samara Oropeza, to the [facility]. Please see a copy of my visit note below.    Foot & Ankle Surgery  March 28, 2017    CC: L ankle injury    I was asked to see Samara Oropeza regarding the chief complaint by:  ERNESTO Abreu    HPI:  Pt is a 21 year old female who presents with above complaint.  approx 1 month ago, sustained inversion-type injury L ankle.  Blacks intermittently, had an episode and injured her ankle.  Believes it was an inversion type but is not positive.  Was seen at [facility], where xrays were read as neg per patient report(I cannot get access to [facility] to find them).  She was seen by Josephine Abreu, and placed into a walking cast boot.  She continues to have pain, and states it occasionally feels like it's worse than ever.  Pain 6/10 \"on and off throughout the day\", worse with increased activities/walking.  She has tried ice, elevatoin, ACE bandage in addition to the walking boot.      ROS:   Pos for CC.  The patient denies current nausea, vomiting, chills, fevers, belly pain, calf pain, chest pain or SOB.  Complete remainder of ROS is otherwise neg.    VITALS:    Vitals:    03/28/17 1302   Pulse: 98   Weight: 153 lb (69.4 kg)   Height: 5' 3\" (1.6 m)       PMH:    Past Medical History:   Diagnosis Date     Anxiety      Behcet's disease (H)      Cervical adenitis May 2010     Chronic abdominal pain      Constipation, chronic 1994     Gastro-oesophageal reflux disease      Gastroparesis      Migraines      Palpitations      Syncope      Tourette's        SXHX:    Past Surgical History:   Procedure Laterality Date     ARTHROSCOPY KNEE WITH PATELLAR REALIGNMENT  7/25/2013    Procedure: ARTHROSCOPY KNEE WITH PATELLAR REALIGNMENT;  Left Knee Arthroscopy, Medial Patellofemoral Ligament Reconstruction with Allograft  ;  Surgeon: Kristina, " Jennifer KILPATRICK MD;  Location: [facility]     DENTAL SURGERY  1996    Teeth removal     ENDOSCOPY UPPER, COLONOSCOPY, COMBINED  2005      ESOPH/GAS REFLUX TEST W NASAL IMPED >1 HR N/A 2/15/2017    Procedure: ESOPHAGEAL IMPEDENCE FUNCTION TEST WITH 24 HOUR PH GREATER THAN 1 HOUR;  Surgeon: Timothy Matta MD;  Location:  GI        MEDS:    Current Outpatient Prescriptions   Medication     OnabotulinumtoxinA (BOTOX IJ)     order for DME     LORazepam (ATIVAN) 1 MG tablet     ondansetron (ZOFRAN-ODT) 4 MG ODT tab     guanFACINE (TENEX) 1 MG tablet     norgestimate-ethinyl estradiol (ORTHO-CYCLEN, SPRINTEC) 0.25-35 MG-MCG per tablet     LORazepam (ATIVAN) 1 MG tablet     lubiprostone (AMITIZA) 24 MCG capsule     Colchicine 0.6 MG CAPS     albuterol (PROAIR HFA/PROVENTIL HFA/VENTOLIN HFA) 108 (90 BASE) MCG/ACT Inhaler     dicyclomine (BENTYL) 10 MG capsule     amitriptyline (ELAVIL) 25 MG tablet     OnabotulinumtoxinA (BOTOX IJ)     SUMAtriptan (IMITREX) 100 MG tablet     promethazine (PHENERGAN) 25 MG tablet     sucralfate (CARAFATE) 1 GM/10ML suspension     metoclopramide (REGLAN) 5 MG tablet     meclizine (ANTIVERT) 25 MG tablet     apremilast (OTEZLA) 30 MG tablet     Apremilast (OTEZLA) 10 & 20 & 30 MG TBPK     Magic Mouthwash (FV std formula) lidocaine visc 2% 2.5mL/5mL & maalox/mylanta w/ simeth 2.5mL/5mL & diphenhydrAMINE 5mg/5mL     clobetasol (TEMOVATE) 0.05 % cream     OnabotulinumtoxinA (BOTOX IJ)     botulinum toxin type A (BOTOX) 100 UNITS injection     lidocaine (LIDODERM) 5 % patch     omeprazole (PRILOSEC) 40 MG capsule     linaclotide (LINZESS) 290 MCG capsule     UNABLE TO FIND     hyoscyamine (ANASPAZ,LEVSIN) 0.125 MG tablet     Aspirin-Acetaminophen-Caffeine (EXCEDRIN MIGRAINE PO)     hypromellose (GENTEAL) 0.3 % SOLN     betamethasone valerate (VALISONE) 0.1 % cream     carbamide peroxide (DEBROX) 6.5 % otic solution     psyllium (METAMUCIL SMOOTH TEXTURE) 63 % POWD     polyethylene glycol  (MIRALAX/GLYCOLAX) powder     Lactase (LACTAID PO)     multivitamin, therapeutic with minerals (MULTI-VITAMIN) TABS     Pyridoxine HCl (VITAMIN B6 PO)     dicyclomine (BENTYL) 20 MG tablet     lidocaine (XYLOCAINE) 2 % jelly     triamcinolone (KENALOG) 0.1 % cream     EPINEPHrine (EPIPEN) 0.3 MG/0.3ML injection     Ranitidine HCl (RANITIDINE 75 PO)     No current facility-administered medications for this visit.        ALL:     Allergies   Allergen Reactions     Amoxil [Penicillins] Rash     Dad unsure of reaction.     Contrast Dye Rash     Contrast Media [facility], 04/09/2014.; [facility], 04/09/2014.  NOTE: this is a contrast media oral with iodine. Premedicate with methylpred standard for IV contrast, request barium contrast for oral contrast.     Kiwi Swelling     Orange Fruit [Citrus] Anaphylaxis     Pineapple Anaphylaxis, Difficulty breathing and Rash     Milk Protein Extract Hives     Reglan [Metoclopramide] Other (See Comments)     IV dose only, in ER, rapid heart rate.     Ace Inhibitors      Difficulty in breathing and GI upset     Amitiza [Lubiprostone] Nausea and Vomiting     Amoxicillin-Pot Clavulanate      Latex      Midazolam Unknown     parent states that when pt takes this medication, she wakes up being very violent .     Versed      Coming out of pelvic exam at age of 5, was kicking and screaming when coming out of the versed.     Adhesive Tape Rash     Azithromycin Hives and Rash     Cephalexin Itching and Rash     Itchy mouth     Keflex [Cephalexin-Fd&C Yellow #6] Hives       FMH:    Family History   Problem Relation Age of Onset     Depression Mother      Neurologic Disorder Mother      Migraines, take imitrex injection.  Also in maternal grandmother.       Alcohol/Drug Father      Hypertension Father      Depression Father      Cardiovascular Maternal Grandmother      Depression Maternal Grandmother      Hypertension Maternal Grandmother      Alzheimer Disease Maternal  Grandmother      Cardiovascular Maternal Grandfather      Hypertension Maternal Grandfather      Depression Maternal Grandfather      Alcohol/Drug Maternal Grandfather      Cardiovascular Paternal Grandmother      Hypertension Paternal Grandmother      Cardiovascular Paternal Grandfather      Hypertension Paternal Grandfather      Glaucoma No family hx of      Macular Degeneration No family hx of        SocHx:    Social History     Social History     Marital status: Single     Spouse name: N/A     Number of children: N/A     Years of education: N/A     Occupational History     Not on file.     Social History Main Topics     Smoking status: Never Smoker     Smokeless tobacco: Never Used     Alcohol use Yes      Comment: seldom     Drug use: No     Sexual activity: No     Other Topics Concern     Not on file     Social History Narrative           EXAMINATION:  Gen:   No apparent distress  Neuro:   A&Ox3, no deficits  Psych:    Answering questions appropriately for age and situation with normal affect  Head:    NCAT  Eye:    Visual scanning without deficit  Ear:    Response to auditory stimuli wnl  Lung:    Non-labored breathing on RA noted  Abd:    NTND per patient report  Lymph:    Edema around L ankle  Vasc:    Pulses palpable, CFT minimally delayed  Neuro:    Light touch sensation intact to all sensory nerve distributions without paresthesias  Derm:    Neg for nodules, lesions or ulcerations  MSK:    tib-fib compression shows some discomfort at the syndesmosis.  Very tender at ATFL and anterior gutter/medial soft space.  Minimal deltoid discomfort. Anterior drawer and talar tilt show pain but no instability, although patient appeared to be guarding.    Calf:    Neg for redness, swelling or tenderness    Assessment:  21 year old female with L ankle sprain and continued pain      Plan:  Discussed etiologies and options  1.  L ankle sprain  -transition from CAM to triloc(dispensed) as able  -RICE/NSAID prn  -tensogrip  for edema control  -[facility] PT referral with dex Rx    Follow up:  4 weeks; MRI if minimal improvement.      Patient's medical history was reviewed today    Body mass index is 27.1 kg/(m^2).  Weight management plan: Patient was referred to their PCP to discuss a diet and exercise plan.        Andres Johnson DPM   Podiatric Foot & Ankle Surgeon  [facility]  [phone]      Again, thank you for allowing me to participate in the care of your patient.        Sincerely,              Andres Johnson DPM, DPPB

## 2017-03-28 NOTE — PATIENT INSTRUCTIONS
[facility] Locations:         Monday Tuesday   [facility]   [address], [address]   [place] [address] [place] [address]   [phone]       Wednesday:  Surgery Day    Surgery Scheduling Line - [phone]       Thursday Morning Thursday Afternoon   [facility]   [address], [address]   [place] [address] [place] [address]   [phone]       Friday Morning To Schedule an Appointment    [facility] Call: [phone] [address] [place] [address]  [phone] PLEASE FAX ALL FORMS TO: [phone]     Follow up: 4 wks    ANKLE SPRAIN  An ankle sprain is an injury to one or more ligaments in the ankle, usually on the outside of the ankle. Ligaments are bands of tissue   like rubber bands   that connect one bone to another and bind the joints together. In the ankle joint, ligaments provide stability by limiting side-to-side movement.  Some ankle sprains are much worse than others. The severity of an ankle sprain depends on whether the ligament is stretched, partially torn, or completely torn, as well as on the number of ligaments involved. Ankle sprains are not the same as strains, which affect muscles rather than ligaments.  CAUSES  Sprained ankles often result from a fall, a sudden twist, or a blow that forces the ankle joint out of its normal position. Ankle sprains commonly occur while participating in sports, wearing inappropriate shoes, or walking or running on an uneven surface.  Sometimes ankle sprains occur because of a person is born with weak ankles. Previous ankle or foot injuries can also weaken the ankle and lead to sprains.  SYMPTOMS  The symptoms of ankle sprains may include: pain or soreness, swelling, bruising, difficulty walking, and stiffness in the joint.  These symptoms may  vary in intensity, depending on the severity of the sprain. Sometimes pain and swelling are absent in people with previous ankle sprains. Instead, they may simply feel the ankle is wobbly and unsteady when they walk. Even if there is no pain or swelling with a sprained ankle, treatment is crucial. Any ankle sprain   whether it s your first or your fifth   requires prompt medical attention.  DIAGNOSIS  In evaluating your injury, the foot and ankle surgeon will obtain a thorough history of your symptoms and examine your foot. X-rays or other advanced imaging studies may be ordered to help determine the severity of the injury.  MEDICAL TREATMENT  There are four key reasons why an ankle sprain should be promptly evaluated and treated by a foot and ankle surgeon:  An untreated ankle sprain may lead to chronic ankle instability, a condition marked by persistent discomfort and a  giving way  of the ankle. Weakness in the leg may also develop.   A more severe ankle injury may have occurred along with the sprain. This might include a serious bone fracture that, if left untreated, could lead to troubling complications.   An ankle sprain may be accompanied by a foot injury that causes discomfort but has gone unnoticed thus far.   Rehabilitation of a sprained ankle needs to begin right away. If rehabilitation is delayed, the injury may be less likely to heal properly.     NON-SURGICAL TREATMENT  When you have an ankle sprain, rehabilitation is crucial and it starts the moment your treatment begins. Your foot and ankle surgeon may recommend one or more of the following treatment options:  Rest. Stay off the injured ankle. Walking may cause further injury.   Ice. Apply an ice pack to the injured area, placing a thin towel between the ice and the skin. Use ice for 20 minutes and then wait at least 40 minutes before icing again.   Compression. An elastic wrap may be recommended to control swelling.   Elevation. The ankle should be  raised slightly above the level of your heart to reduce swelling.   Early physical therapy. Your doctor will start you on a rehabilitation program as soon as possible to promote healing and increase your range of motion. This includes doing prescribed exercises.   Medications. Nonsteroidal anti-inflammatory drugs (NSAIDs), such as ibuprofen, may be recommended to reduce pain and inflammation. In some cases, prescription pain medications are needed to provide adequate relief.   SURGICAL TREATMENT  In more severe cases, surgery may be required to adequately treat an ankle sprain. Surgery often involves repairing the damaged ligament or ligaments. The foot and ankle surgeon will select the surgical procedure best suited for your case based on the type and severity of your injury as well as your activity level.  After surgery, rehabilitation is extremely important. Completing your rehabilitation program is crucial to a successful outcome. Be sure to continue to see your foot and ankle surgeon during this period to ensure that your ankle heals properly and function is restored.    PRICE THERAPY    Many aches and pains throughout the foot and ankle can be helped with many simple treatments. This is usually described as PRICE Therapy.      P - Protection - often times, inflammation/pain in the lower extremity is not able to improve simply because the areas involved are never allowed to rest. Every step we take can bother the problematic area. Protecting those areas is an important step in the healing process. This may involve a walking cast boot, a special insert/orthotic device, an ankle brace, or simply avoiding barefoot walking.    R - Rest - in addition to protecting the foot/ankle, resting is an important, but often times difficult, treatment option. Getting off your feet when they bother you, and specifically avoiding activities that cause pain/discomfort, are very beneficial to prevent, and treat, foot/ankle pain.       I - Ice - icing regularly can help to decrease inflammation and swelling in the foot, thus decreasing pain. Using an ice pack or a bag of frozen veggies works very well. Ice for 20 minutes multiple times per day as needed.  Do not place the ice directly on the skin as this can cause tissue damage.    C - Compression - using a compression wrap or an ACE wrap can help to decrease swelling, which can help to decrease pain. Wearing the wraps is generally not needed at night, but they should be worn on a regular basis when you are going to be on your feet for prolonged periods as gravity tends to pull fluids down to your feet/ankles.    E - Elevation - elevating your lower extremities multiple times daily for 15-20 minutes can help to decrease swelling, which works well in decreasing pain levels.    NSAID/Tylenol - Anti-inflammatories like Aleve or ibuprofen, and/or a pain medication, such as Tylenol, can help to improve pain levels and get the issue resolved sooner rather than later. Anyone with liver issues should be careful with Tylenol, and anyone with high blood pressure or heart, stomach or kidney issues should be careful with anti-inflammatories. Please ask if you have questions about these medications, including dosage.          Body Mass Index (BMI)    Many things can cause foot and ankle problems. Foot structure, activity level, foot mechanics and injuries are common causes of pain.    One very important issue that often goes unmentioned, is body weight.  Extra weight can cause increased stress on muscles, ligaments, bones and tendons. Sometimes just a few extra pounds is all it takes to put one over her/his threshold. Without reducing that stress, it can be difficult to alleviate pain.      Some people are uncomfortable addressing this issue, but we feel it is important for you to think about it. As Foot & Ankle specialists, our job is addressing the lower extremity problem and possible causes.      Regarding extra body weight, we encourage patients to discuss diet and weight management plans with their primary care doctors. It is this team approach that gives you the best opportunity for pain relief and getting you back on your feet.

## 2017-03-31 ENCOUNTER — OFFICE VISIT (OUTPATIENT)
Dept: RHEUMATOLOGY | Facility: CLINIC | Age: 22
End: 2017-03-31
Attending: INTERNAL MEDICINE
Payer: COMMERCIAL

## 2017-03-31 ENCOUNTER — THERAPY VISIT (OUTPATIENT)
Dept: PHYSICAL THERAPY | Facility: CLINIC | Age: 22
End: 2017-03-31
Payer: COMMERCIAL

## 2017-03-31 VITALS — HEART RATE: 94 BPM | SYSTOLIC BLOOD PRESSURE: 106 MMHG | OXYGEN SATURATION: 97 % | DIASTOLIC BLOOD PRESSURE: 73 MMHG

## 2017-03-31 DIAGNOSIS — M35.2 BEHCET'S SYNDROME (H): ICD-10-CM

## 2017-03-31 DIAGNOSIS — M35.2 BEHCET'S DISEASE (H): ICD-10-CM

## 2017-03-31 DIAGNOSIS — M25.572 ACUTE LEFT ANKLE PAIN: Primary | ICD-10-CM

## 2017-03-31 PROCEDURE — 97530 THERAPEUTIC ACTIVITIES: CPT | Mod: GP | Performed by: PHYSICAL THERAPIST

## 2017-03-31 PROCEDURE — 97161 PT EVAL LOW COMPLEX 20 MIN: CPT | Mod: GP | Performed by: PHYSICAL THERAPIST

## 2017-03-31 PROCEDURE — 97110 THERAPEUTIC EXERCISES: CPT | Mod: GP | Performed by: PHYSICAL THERAPIST

## 2017-03-31 RX ORDER — COLCHICINE 0.6 MG/1
1 CAPSULE ORAL SEE ADMIN INSTRUCTIONS
Qty: 90 CAPSULE | Refills: 3 | Status: SHIPPED | OUTPATIENT
Start: 2017-03-31 | End: 2017-08-22

## 2017-03-31 ASSESSMENT — PAIN SCALES - GENERAL: PAINLEVEL: SEVERE PAIN (6)

## 2017-03-31 NOTE — PROGRESS NOTES
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 Lilliana Miramontes- complicated migraine  # On Sprintec for birth control    Meets ISG 1990 criteria for Behcets. Initially, very good response to colchicine which has reduced the intensity and frequency of the oral ulcers in the past. Patient had one genital ulcer and few oral ulcers in the last few months, still  Has folliculitis in skin. Seen Derm Dr. Elliott. He has recommended otezla. Otezla is approved for her now. Slow tapering up because of GI intolerance. Currently on 15mg PO BID. Increase to 15mg in AM and 30mg in PM daily X 2 weeks and then 30mg twice daily. Continue colchicine 1.2mg in AM and 0.6mg in PM daily    Has tried prednisone in the past, but does not tolerate well due to mood issues, and has been off prednisone for the past 6+ months. Has H/o Tourettes. Followed by Dr. Miramontes.     She continues to have GI symptoms  Colonoscopy was negative in the past.  Has gastroparesis.  Behcets may have GI involvement but no evidence of GI inflammation at this time. Dr. Baker has evaluated her.   She gets visual symptoms  But there is no evidence of uveitis including posterior uveitis or retinal vasculitis, denies symptoms at today's visit. She has seen Dr. Garcia in the past and also seen Dr. Heaton  around 2/16      She also likely has fibromyalgia which is uncontrolled. We will address Behcets first.     For chest symptoms, she has been referred to pulmonology by GI.     - Continue Triamcinolone acetonide cream (0.1 percent in [place]) three to four times daily during attacks of oral ulcers and be used until pain from the ulcer ceases. Potent topical corticosteroids may be used for genital ulcers. Also use magic mouthwash as needed.  - Other comorbidities to watch for in Behcets: Vascular disease in Behçet s is more common in men. Large vessel vascular involvement occurs in approximately one-third of patients with Behcet s disease. Pulmonary artery vasculitis can present with hemoptysis (misdiagnosis can lead to poor prognosis). Secondary fibromyalgia, CNS disease, amyloidosis, sacroiliitis.     RTC 4-6 weeks.     I discussed that I will be moving to [facility] in April 2017. Patient's choice to f/u with Rheumatology at the [facility] or with me in [facility]. Patient would receive a letter regarding this sometime in March 2017. Patient prefers to follow up with me in [facility]. F/u in 4-6 weeks     Left ankle sprain- using boots. F/u with podiatry in 4 weeks.     Data Unavailable    No orders of the defined types were placed in this encounter.      Medications Discontinued During This Encounter   Medication Reason     Colchicine 0.6 MG CAPS Reorder     apremilast (OTEZLA) 30 MG tablet Reorder     Current Outpatient Prescriptions   Medication Sig Dispense Refill     Colchicine 0.6 MG CAPS Take 0.6 mg by mouth See Admin Instructions 2 capsules in AM and 1 capsule in PM 90 capsule 3     apremilast (OTEZLA) 30 MG tablet 15mg in AM and 30mg in PM daily X 2 weeks and then 30mg twice daily. 60 tablet 3     dexamethasone (DECADRON) 4 MG/ML injection Apply 1 mL (4 mg) topically as needed 30 mL 0     order for DME Equipment being ordered: trilock ankle brace 1 Device 0     OnabotulinumtoxinA (BOTOX IJ) Inject  162.5 Units as directed once Lot #: /C3  Exp: 10/2019       order for DME Equipment being ordered: left boot for ankle and foot immobilization 1 Units 0     LORazepam (ATIVAN) 1 MG tablet Take 1 tablet (1 mg) by mouth every 8 hours as needed for anxiety 90 tablet 0     ondansetron (ZOFRAN-ODT) 4 MG ODT tab Take 1 tablet (4 mg) by mouth 3 times daily as needed for nausea 120 tablet 3     guanFACINE (TENEX) 1 MG tablet 3 tablets in the morning and 3 tablets in the evening 180 tablet 3     norgestimate-ethinyl estradiol (ORTHO-CYCLEN, SPRINTEC) 0.25-35 MG-MCG per tablet Take 1 tablet by mouth daily 84 tablet 3     LORazepam (ATIVAN) 1 MG tablet Take 1-2 tablets (1-2 mg) by mouth every 8 hours as needed for other (abdominal pain) Do not operate a vehicle after taking this medication 90 tablet 0     lubiprostone (AMITIZA) 24 MCG capsule Take 1 capsule (24 mcg) by mouth 2 times daily (with meals) 60 capsule 3     albuterol (PROAIR HFA/PROVENTIL HFA/VENTOLIN HFA) 108 (90 BASE) MCG/ACT Inhaler Inhale 2 puffs into the lungs every 6 hours as needed for shortness of breath / dyspnea or wheezing 1 Inhaler 1     dicyclomine (BENTYL) 10 MG capsule Take 1-2 capsules (10-20 mg) by mouth 2 times daily as needed 120 capsule 3     amitriptyline (ELAVIL) 25 MG tablet Take 1 tablet (25 mg) by mouth At Bedtime May increase to 2, if no benefit after 2 weeks.  Feel free to call / page the nurse for Dr. Mcmahon at [phone] / [phone] with questions regarding this order. 45 tablet 3     OnabotulinumtoxinA (BOTOX IJ) Inject 150 Units into the muscle Lot # /[id number]  Exp: 8/2019       SUMAtriptan (IMITREX) 100 MG tablet Take 1 tablet (100 mg) by mouth at onset of headache for migraine May repeat in 2 hours. Max 2 tablets/24 hours. 18 tablet 3     promethazine (PHENERGAN) 25 MG tablet Take 0.5-1 tablets (12.5-25 mg) by mouth every 6 hours as needed for nausea 20 tablet 1     sucralfate (CARAFATE) 1 GM/10ML suspension Take 10 mLs (1  g) by mouth 4 times daily 1200 mL 2     metoclopramide (REGLAN) 5 MG tablet Take 1 tablet (5 mg) by mouth 2 times daily as needed 60 tablet 2     meclizine (ANTIVERT) 25 MG tablet Take 1 tablet (25 mg) by mouth every 6 hours as needed for dizziness 30 tablet 1     Apremilast (OTEZLA) 10 & 20 & 30 MG TBPK Take one tablet in the morning on day 1, then take one tablet every 12 hours on days 2 thru 14, according to the instructions on the packet. 27 tablet 0     Magic Mouthwash (FV std formula) lidocaine visc 2% 2.5mL/5mL & maalox/mylanta w/ simeth 2.5mL/5mL & diphenhydrAMINE 5mg/5mL Swish and swallow 10 mLs in mouth every 6 hours as needed for mouth sores 1 Bottle 1     clobetasol (TEMOVATE) 0.05 % cream Apply topically 2 times daily 60 g 0     OnabotulinumtoxinA (BOTOX IJ) Inject 150 Units into the muscle once Lot: /C3 Exp: 05/2019       botulinum toxin type A (BOTOX) 100 UNITS injection Inject 200 Units into the muscle every 3 months 200 Units 3     lidocaine (LIDODERM) 5 % patch Apply up to 3 patches to painful area at once for up to 12 h within a 24 h period.  Remove after 12 hours. 30 patch 1     omeprazole (PRILOSEC) 40 MG capsule Take 1 capsule (40 mg) by mouth daily 30 capsule 9     linaclotide (LINZESS) 290 MCG capsule Take 1 capsule (290 mcg) by mouth every morning (before breakfast) 30 capsule 1     UNABLE TO FIND daily MEDICATION NAME: Peppermint supplement       hyoscyamine (ANASPAZ,LEVSIN) 0.125 MG tablet Take 1-2 tablets (125-250 mcg) by mouth every 4 hours as needed for cramping 40 tablet 1     Aspirin-Acetaminophen-Caffeine (EXCEDRIN MIGRAINE PO) Take by mouth as needed        hypromellose (GENTEAL) 0.3 % SOLN 1 drop every hour as needed       betamethasone valerate (VALISONE) 0.1 % cream Apply topically 2 times daily       carbamide peroxide (DEBROX) 6.5 % otic solution 5 drops 2 times daily       psyllium (METAMUCIL SMOOTH TEXTURE) 63 % POWD Take 3 teaspoonful by mouth 3 times daily Mix in 8  ounces of water 1 Bottle 11     polyethylene glycol (MIRALAX/GLYCOLAX) powder Take 17 g by mouth 2 times daily       Lactase (LACTAID PO) Take by mouth daily       multivitamin, therapeutic with minerals (MULTI-VITAMIN) TABS Take 1 tablet by mouth daily Reported on 3/21/2017       Pyridoxine HCl (VITAMIN B6 PO)        dicyclomine (BENTYL) 20 MG tablet Take 1 tablet (20 mg) by mouth every 6 hours 90 tablet 1     lidocaine (XYLOCAINE) 2 % jelly Apply 1 Tube topically daily       triamcinolone (KENALOG) 0.1 % cream Apply sparingly to oral ulcers three times daily for 14 days as needed. 15 g 1     EPINEPHrine (EPIPEN) 0.3 MG/0.3ML injection Inject 0.3 mLs into the muscle once as needed for anaphylaxis for 1 dose. And call 911. 2 each 1     Ranitidine HCl (RANITIDINE 75 PO) Take  by mouth. As needed for GI upset       Dr.Prabhu Valdez Marquez MD.           Active Problem List:     Patient Active Problem List    Diagnosis Date Noted     Acute left ankle pain 03/31/2017     Priority: Medium     Cervical dystonia 03/28/2017     Priority: Medium     PTSD (post-traumatic stress disorder) 01/17/2017     Priority: Medium     Left knee pain 10/20/2016     Priority: Medium     Patellofemoral instability 10/20/2016     Priority: Medium     Fibromyalgia 08/04/2016     Priority: Medium     Rheumatoid arthritis of multiple sites without rheumatoid factor (H) 08/04/2016     Priority: Medium     Raynaud's disease without gangrene 08/04/2016     Priority: Medium     Chronic abdominal pain 08/04/2016     Priority: Medium     Palpitations 01/12/2016     Priority: Medium     On colchicine therapy 10/30/2015     Priority: Medium     Spell of shaking 05/06/2015     Priority: Medium     Migraine 02/04/2015     Priority: Medium     Problem list name updated by automated process. Provider to review       Behcet's disease (H) 12/10/2014     Priority: Medium     Headaches due to old head injury 11/12/2013     Priority: Medium     Milk protein  intolerance 10/11/2013     Priority: Medium     Concussion 02/13/2013     Priority: Medium     Jan 2013, with prolonged recovery- followed by sports med         Knee pain 01/03/2013     Priority: Medium     Anxiety 06/25/2009     Priority: Medium     Tics - Tourette syndrome 05/18/2009     Priority: Medium     Followed by psychotherapy. Symptoms well managed. Originally diagnosed at [facility] neurolog[facility]. (Dr. Simpson)           IBS (irritable bowel syndrome) 05/18/2009     Priority: Medium     Seasonal allergic rhinitis 05/18/2009     Priority: Medium          History of Present Illness:     Chief Complaint   Patient presents with     RECHECK     Follow up for RA     [place] Dr. Marilyn Moran [place] [facility] [place]:    Original presentation 2014:    Ms Oropeza is a 19 y.o. female who presents with one year of presentation of painful oral ulcers (monthly) once that have worsened with two episodes in the last two months that presented with painful vulvar or vaginal ulcers (2 episodes so far every month) [not sure if they leave scar] as well that timing [place] [place]   ORAL ULCERS: last two episodes were severe, painful, white base with erythematous halo, that appear and disappear in the matter of 1-2 weeks without, does not bleed and doesn't respond to any treatment used (magic mouthwash), 10-15 in number with the biggest one being dime size.     VAGINAL ULCERS: 2-3 in number between labia majora and minora that occur simultaneously with oral ulcers, painful, non bleeding ulcers that are erythematous, no pus.     FOLLICULITIS: patient reports having spots in legs specially around ankle and knee, but arms, and neck are affected as well. Small lesions that resemble ingrown hair, most are red erythematous elevated lesions, well demarcated, some with liquid that patient refers as pimple like.     SKIN RASHES: welts and red macules with well defined borders that are pruritic and non-ulcerative on chest,  arms and back. Benadryl relieves.     ARTHRALGIAS: In descending order of severity: hips, knees, wrists, shoulders, neck, lumbar, fingers.   C/o morning stiffness in hips, back and neck lasting for about until noon.     Ms. Oropeza reports they present in severe flares and never fully resolve, but do get better. She has seen some swelling and warmth on the affected joints but has not noticed and redness. Has tried Tylenol, ibuprofen, and hydrocodone with not much benefit.     FEVERS: Reports 3-4 sporadic episodes per month of self limited fevers of 38 C that occur during the evenings and associate facial flushing.     HEADACHES: occur daily and are bitemporal and irradiate occipitally, pulsatile in nature, intensity varies from intense to mild, are worsened with movement. On treatment with ibuprofen and somatriptan without any positive results.     VISION CHANGES: Patient reports that suddenly she would only see a gray blotch in her right eyes and would persist like this for a day and a half. Also reports blurriness in her left eye. Presence of pain and burning sensation of eyeball with associated redness. Has changed prescription glasses in the matter of 2 years 3 times. She has had opthalmology exam and was not suggestive of any evidence of uveitis.   She was also evaluated in ED for a dizzy spell.     ABD PAIN W/ INTERMITTENT DIARRHEA AND CONSTIPATION: Patient reports abdominal pain that is intermittent, periods of 7 days without bowel movement and feeling bloated with change of pattern to diarrhea (liquidy without blood nor mucus, non foul smelling). Has taken Bentyl, Zegrid, and rinetidine with mild clinical improvement.  She also reports small red nodules after each needle stick for blood draw.   Neurology saw her back then and was not impressed with her presentation as physical examination was normal. Also MRI brain normal: Findings: No definite hemorrhage, mass affect, midline shift, or ventriculomegaly is  noted.There are a few scattered non specific white matter T2 hyperintensities. Axial diffusion weighted images are unremarkable.     The major vascular structures appear patent. There is opacification and severe mucosal thickening in the right maxillary sinus. The remaining paranasal sinuses, and mastoid air cells are clear. Orbits are unremarkable  She has + HSV-1 IGG. Seen ID. Negative for Herpes simplex virus culture. HSV IGM I/II COMBINATION SENT TO [facility]  RESULTS = <0.91  REF RANGE: <0.91 = NEGATIVE  ID did not think HSV could explain her mouth and genital sores.     CRP within normal limits. HIV negative. CBC within normal limits; UA negative. Creatinine within normal limits   CYNDIE neg.  Antigliadin neg.   ANti TTG neg.   [facility] 2014: Colonoscopy and endoscopy neg; result not available. Not sure if biopsies were done.   Raynaud's phenomenon:  Onset: about 2013  Digits: all fingers  Digital ulcers: no  Color changes: white ---> purple and red  Duration of an attack: About couple of hours per mom  Pain during attack: not clear pain but bruise like feeling  Frequency of attacks: few times a month  Family history of Raynauds: no  GERD: very long time    No hemoptysis.   No miscarriages/ no thrombosis in past.   No family or personal history of psoriasis, ulcerative colitis or chron's disease.   No buttock pain or low back pain or stiffness in AM    Interim history:  Dec 2014- Multiple mouth ulcers and did not eat for 5 days. This coincided with periods. Colchicine 0.6mg PO BID started in Nov 2014.   Prednisone taper in Dec 20mg to 0 in 4 weeks. She also used magic mouthwash. Kenalog cream. After going to the ER, 3 days later her ulcers were better. She thinks it improved after the menstruation stopped.   LMP 3 weeks ago.   COLCHICINE HAS HELPED A LOT REDUCING THE INTENSITY OF MENSTRUATION ASSOCIATED ORAL AND VULVAR ULCERS.     Interim history: 6/15    Since last visit only two episodes of mouth sores- small sized  "6   No genital ulcers since last visit.   Colchicine 1.2 mg in AM and 0.6mg in PM keeps her symptoms under control.     Admitted in 5/15:  Admission Diagnoses:  - LUE weakness  - spell  - hx of migraines  - hx of Tourette syndrome  - hx of Behcet's disease    Discharge Diagnoses:   - spell likely 2/2 anxiety  - hx of migraines  - hx of Tourette syndrome  - hx of Behcet's disease    CT and MRI brain was normal.     Seeing Dr. Lilliana Miramontes soon as outpatient.     Dr. Garcia did not find any intraocular inflammation.      Interm 10/30/2015  ED for migraine. Continues to see neuro - MRI brain without lesions noted. Saw GI - upper barium normal. May be considering repeat colo. Worsening lesions in mouth and genitals. Has had continuous lesion in mouth x 2 months. 2 genital ulcers. Had fracture of right sesamoid in foot. Continues to have low grade fevers. New folliculitis on chest, legs and arms.     Interim hx: 1/11/2016    Pt states that since last visit she continues to have oral ulcers and has noted more folliculitis-like lesions on her lower extremities.  She states that on her right lower leg she had a red macular spot that has since resolved.  She denies uveitis.  She states that the colchicine initially helped but then stopped working.  She takes 0.6 mg TID.  She stopped taking her prednisone last week as she feels like this hasn't helped.  She hasn't had any episodes of vaginal ulcers.      She saw GI who stated she has gastroparesis.  She is seeing Cardiology later this week for possible syncopal episodes.      Interim history 5/16  Mouth ulcers X 1 last month  Genital ulcers - none since last visit.     Bilateral MCPs pain, knee pain and low back pain +ve increased since last visit  Morning stiffness X 2 hours (increasing)   5-6/10    \"overall myalgia\" has gotten worse    C/o pseudofolliculitis lesion on anterior shins bilaterally worse    Interim history: (7/18/2016)  Patient has just started Humira for " 2 doses on 7/1 and 7/15 and reported minimal improvement of her hip pain but otherwise, did not notice anything different.     She reported painful mouth ulcer once a month since last visit but noticed that it has been getting deeper.   Denied any genital ulcers.    Still has ongoing bilateral MCPs pain, knee pain but this has been intermittent and she did not have any much pain today. She reported 2-3 hours morning stiffness of both hands and pain score of 6/10 at her knees and 8/10 of her hands but currently takes no pain medication except prn ativan which she takes when her muscle is really tight.     The only concern is that she gained weight even though she has not been eating much (eat once a day) and do exercises every day for 1 hour (with video of yoga, pilates). Her mother wonders if she needs to have some labs work up include sex hormone, thyroid, cortisol.    Interval history 9/14/16  Patient was started Humira at the last visit, patient reports worsening of skin ulcers since starting Humira and stopped taking Humura for the past 2 weeks. Patient reports oral and genital ulcers has been stable even before starting Humira and has not had any interval oral/genital ulcers. However she reports recurrent skin ulcers occurring on a daily basis that affects her chest and anterior legs. Patient also has stopped taking prednisone, she reports that she doesn't feel the prednisone helps, her last dose of prednisone was 6+ months ago. Patient also report worsening low back pain that sometimes causes her to limp.    In the interval patient also visited ED on 8/31/16 for left hand pain and what the patient describes as phlebitis, no medication or procedure was done and the patient was discharged with a splint. Patient also reported 1 episode of chest pressure that was associated with dyspnea and numbness of the left arm, she was shopping in a [employer] at the time of onset, and the pressure resolved after she took a  nap.    Patient denies any infectious symptoms in the interval, no fever, chills, night sweat, cough, dysuria, denies eye pain or visual changes.    November 4, 2016  Oct - had one genital ulcer  Oral ulcers X 5- around menstruation time.   Knee pain- chronic on the left side  Dizziness spells - on and off; been to the ER for that in the past.   On and off migraines  July 2013 - s/p MPFL reconstruction plus LRL 7/2013  Morning stiffness in knee (left) X 1 hour    Severe jaw pain and chest pain- patient wondering if this is Tourette's or esophageal spasms. Cardiac workup negative.   Fever     January 13, 2017  Yesterday in ER [facility] [place] with orthostatic syncope from dehydration.   Chest pain on and off still continues. Painful with deep breaths.   Oral ulcers - few minor ones lasting for one week;   One major one in roof of mouth lasting for about one week.   Knee pain +ve - reviewed the recent MRI with her orthopedic surgeon.   On and off migraines  otezla is approved. Still waiting to receive the meds.     BP Readings from Last 3 Encounters:   03/31/17 106/73   03/27/17 131/78   03/21/17 110/76     March 31, 2017  Otezla current dose 15mg PO BID.   She is tolerating okay at this dose and is willing to increase the dose further up to 30mg BID.   Her joint pains are better on otezla. Knee pain is also better.   Back and neck pain +ve 8/10 when it is worse. Intramuscular botox injections were given on Monday in PMR.   2 minor genital ulcers in the interim- resolved.   Few oral ulcers in the interim- resolved.   Nasal ulcer - resolved.   Migraines on and off.   Nausea with otezla but improving.   Dizziness and blackouts on and off. She fell once and hurt her ankle. Wearing boot in left leg.   Complete ROS negative except for above         Past Medical History:     Past Medical History:   Diagnosis Date     Anxiety      Behcet's disease (H)      Cervical adenitis May 2010     Chronic abdominal pain      Constipation, chronic  1994     Gastro-oesophageal reflux disease      Gastroparesis      Migraines      Palpitations      Syncope      Tourette's      Past Surgical History:   Procedure Laterality Date     ARTHROSCOPY KNEE WITH PATELLAR REALIGNMENT  7/25/2013    Procedure: ARTHROSCOPY KNEE WITH PATELLAR REALIGNMENT;  Left Knee Arthroscopy, Medial Patellofemoral Ligament Reconstruction with Allograft  ;  Surgeon: Jennifer Acevedo MD;  Location: [facility]     DENTAL SURGERY  1996    Teeth removal     ENDOSCOPY UPPER, COLONOSCOPY, COMBINED  2005     HC ESOPH/GAS REFLUX TEST W NASAL IMPED >1 HR N/A 2/15/2017    Procedure: ESOPHAGEAL IMPEDENCE FUNCTION TEST WITH 24 HOUR PH GREATER THAN 1 HOUR;  Surgeon: Timothy Matta MD;  Location:  GI          Social History:     Social History     Occupational History     Not on file.     Social History Main Topics     Smoking status: Never Smoker     Smokeless tobacco: Never Used     Alcohol use Yes      Comment: seldom     Drug use: No     Sexual activity: No          Family History:     Family History   Problem Relation Age of Onset     Depression Mother      Neurologic Disorder Mother      Migraines, take imitrex injection.  Also in maternal grandmother.       Alcohol/Drug Father      Hypertension Father      Depression Father      Cardiovascular Maternal Grandmother      Depression Maternal Grandmother      Hypertension Maternal Grandmother      Alzheimer Disease Maternal Grandmother      Cardiovascular Maternal Grandfather      Hypertension Maternal Grandfather      Depression Maternal Grandfather      Alcohol/Drug Maternal Grandfather      Cardiovascular Paternal Grandmother      Hypertension Paternal Grandmother      Cardiovascular Paternal Grandfather      Hypertension Paternal Grandfather      Glaucoma No family hx of      Macular Degeneration No family hx of           Allergies:     Allergies   Allergen Reactions     Amoxil [Penicillins] Rash     Dad unsure of reaction.     Contrast Dye  Rash     Contrast [place] [facility] [place] Contrast [facility] [place] [place].  NOTE: this is a contrast media oral with iodine. Premedicate with methylpred standard for IV contrast, request barium contrast for oral contrast.     Kiwi Swelling     Orange Fruit [Citrus] Anaphylaxis     Pineapple Anaphylaxis, Difficulty breathing and Rash     Milk Protein Extract Hives     Reglan [Metoclopramide] Other (See Comments)     IV dose only, in ER, rapid heart rate.     Ace Inhibitors      Difficulty in breathing and GI upset     Amitiza [Lubiprostone] Nausea and Vomiting     Amoxicillin-Pot Clavulanate      Latex      Midazolam Unknown     parent states that when pt takes this medication, she wakes up being very violent .     Versed      Coming out of pelvic exam at age of 5, was kicking and screaming when coming out of the versed.     Adhesive Tape Rash     Azithromycin Hives and Rash     Cephalexin Itching and Rash     Itchy mouth     Keflex [Cephalexin-Fd&C Yellow #6] Hives          Medications:     Current Outpatient Prescriptions   Medication Sig Dispense Refill     Colchicine 0.6 MG CAPS Take 0.6 mg by mouth See Admin Instructions 2 capsules in AM and 1 capsule in PM 90 capsule 3     apremilast (OTEZLA) 30 MG tablet 15mg in AM and 30mg in PM daily X 2 weeks and then 30mg twice daily. 60 tablet 3     dexamethasone (DECADRON) 4 MG/ML injection Apply 1 mL (4 mg) topically as needed 30 mL 0     order for DME Equipment being ordered: trilock ankle brace 1 Device 0     OnabotulinumtoxinA (BOTOX IJ) Inject 162.5 Units as directed once Lot #: /C3  Exp: 10/2019       order for DME Equipment being ordered: left boot for ankle and foot immobilization 1 Units 0     LORazepam (ATIVAN) 1 MG tablet Take 1 tablet (1 mg) by mouth every 8 hours as needed for anxiety 90 tablet 0     ondansetron (ZOFRAN-ODT) 4 MG ODT tab Take 1 tablet (4 mg) by mouth 3 times daily as needed for nausea 120 tablet 3     guanFACINE  (TENEX) 1 MG tablet 3 tablets in the morning and 3 tablets in the evening 180 tablet 3     norgestimate-ethinyl estradiol (ORTHO-CYCLEN, SPRINTEC) 0.25-35 MG-MCG per tablet Take 1 tablet by mouth daily 84 tablet 3     LORazepam (ATIVAN) 1 MG tablet Take 1-2 tablets (1-2 mg) by mouth every 8 hours as needed for other (abdominal pain) Do not operate a vehicle after taking this medication 90 tablet 0     lubiprostone (AMITIZA) 24 MCG capsule Take 1 capsule (24 mcg) by mouth 2 times daily (with meals) 60 capsule 3     albuterol (PROAIR HFA/PROVENTIL HFA/VENTOLIN HFA) 108 (90 BASE) MCG/ACT Inhaler Inhale 2 puffs into the lungs every 6 hours as needed for shortness of breath / dyspnea or wheezing 1 Inhaler 1     dicyclomine (BENTYL) 10 MG capsule Take 1-2 capsules (10-20 mg) by mouth 2 times daily as needed 120 capsule 3     amitriptyline (ELAVIL) 25 MG tablet Take 1 tablet (25 mg) by mouth At Bedtime May increase to 2, if no benefit after 2 weeks.  Feel free to call / page the nurse for Dr. Mcmahon at [phone] / [phone] with questions regarding this order. 45 tablet 3     OnabotulinumtoxinA (BOTOX IJ) Inject 150 Units into the muscle Lot # /C3  Exp: 8/2019       SUMAtriptan (IMITREX) 100 MG tablet Take 1 tablet (100 mg) by mouth at onset of headache for migraine May repeat in 2 hours. Max 2 tablets/24 hours. 18 tablet 3     promethazine (PHENERGAN) 25 MG tablet Take 0.5-1 tablets (12.5-25 mg) by mouth every 6 hours as needed for nausea 20 tablet 1     sucralfate (CARAFATE) 1 GM/10ML suspension Take 10 mLs (1 g) by mouth 4 times daily 1200 mL 2     metoclopramide (REGLAN) 5 MG tablet Take 1 tablet (5 mg) by mouth 2 times daily as needed 60 tablet 2     meclizine (ANTIVERT) 25 MG tablet Take 1 tablet (25 mg) by mouth every 6 hours as needed for dizziness 30 tablet 1     Apremilast (OTEZLA) 10 & 20 & 30 MG TBPK Take one tablet in the morning on day 1, then take one tablet every 12 hours on days 2 thru 14,  according to the instructions on the packet. 27 tablet 0     Magic Mouthwash (FV std formula) lidocaine visc 2% 2.5mL/5mL & maalox/mylanta w/ simeth 2.5mL/5mL & diphenhydrAMINE 5mg/5mL Swish and swallow 10 mLs in mouth every 6 hours as needed for mouth sores 1 Bottle 1     clobetasol (TEMOVATE) 0.05 % cream Apply topically 2 times daily 60 g 0     OnabotulinumtoxinA (BOTOX IJ) Inject 150 Units into the muscle once Lot: /C3 Exp: 05/2019       botulinum toxin type A (BOTOX) 100 UNITS injection Inject 200 Units into the muscle every 3 months 200 Units 3     lidocaine (LIDODERM) 5 % patch Apply up to 3 patches to painful area at once for up to 12 h within a 24 h period.  Remove after 12 hours. 30 patch 1     omeprazole (PRILOSEC) 40 MG capsule Take 1 capsule (40 mg) by mouth daily 30 capsule 9     linaclotide (LINZESS) 290 MCG capsule Take 1 capsule (290 mcg) by mouth every morning (before breakfast) 30 capsule 1     UNABLE TO FIND daily MEDICATION NAME: Peppermint supplement       hyoscyamine (ANASPAZ,LEVSIN) 0.125 MG tablet Take 1-2 tablets (125-250 mcg) by mouth every 4 hours as needed for cramping 40 tablet 1     Aspirin-Acetaminophen-Caffeine (EXCEDRIN MIGRAINE PO) Take by mouth as needed        hypromellose (GENTEAL) 0.3 % SOLN 1 drop every hour as needed       betamethasone valerate (VALISONE) 0.1 % cream Apply topically 2 times daily       carbamide peroxide (DEBROX) 6.5 % otic solution 5 drops 2 times daily       psyllium (METAMUCIL SMOOTH TEXTURE) 63 % POWD Take 3 teaspoonful by mouth 3 times daily Mix in 8 ounces of water 1 Bottle 11     polyethylene glycol (MIRALAX/GLYCOLAX) powder Take 17 g by mouth 2 times daily       Lactase (LACTAID PO) Take by mouth daily       multivitamin, therapeutic with minerals (MULTI-VITAMIN) TABS Take 1 tablet by mouth daily Reported on 3/21/2017       Pyridoxine HCl (VITAMIN B6 PO)        dicyclomine (BENTYL) 20 MG tablet Take 1 tablet (20 mg) by mouth every 6 hours 90  tablet 1     lidocaine (XYLOCAINE) 2 % jelly Apply 1 Tube topically daily       triamcinolone (KENALOG) 0.1 % cream Apply sparingly to oral ulcers three times daily for 14 days as needed. 15 g 1     EPINEPHrine (EPIPEN) 0.3 MG/0.3ML injection Inject 0.3 mLs into the muscle once as needed for anaphylaxis for 1 dose. And call 911. 2 each 1     Ranitidine HCl (RANITIDINE 75 PO) Take  by mouth. As needed for GI upset            Physical Exam:   Blood pressure 106/73, pulse 94, SpO2 97 %, not currently breastfeeding.  Wt Readings from Last 4 Encounters:   03/28/17 69.4 kg (153 lb)   03/27/17 69.4 kg (153 lb)   03/21/17 69.7 kg (153 lb 11.2 oz)   01/17/17 69.4 kg (153 lb)     Constitutional: well-developed, appearing stated age; cooperative  Eyes: nl EOM, PERRLA, vision, conjunctiva, sclera  ENT: No oral ulcer seen.   nl external ears, nose, hearing, lips, teeth, gums, throat  No mucous membrane lesions, normal saliva pool  Neck: no mass or thyroid enlargement  Resp: lungs clear to auscultation,   CV: RRR, no murmurs, rubs or gallops, no edema  GI: no ABD mass or tenderness, no HSM  : not tested  Lymph: no cervical, supraclavicular or epitrochlear nodes  MS: left ankle and lower leg anterior tenderness +ve  All shoulder, elbow, wrist, MCP/PIP/DIP, hip, ankle, and foot MTP/IP joints were examined and  found normal except tenderness on even light touch of both knees with minimal swelling but no warmth. No active synovitis or deformity. Full ROM.  Normal  strength. Fist 100%.  No dactylitis,  tenosynovitis, enthesopathy.  Skin: no nail pitting, alopecia, rash  Psych: nl judgement, orientation, memory, affect.         Data:

## 2017-03-31 NOTE — LETTER
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 Lilliana Miramontes- complicated migraine  # On Sprintec for birth control    Meets ISG 1990 criteria for Behcets. Initially, very good response to colchicine which has reduced the intensity and frequency of the oral ulcers in the past. Patient had one genital ulcer and few oral ulcers in the last few months, still  Has folliculitis in skin. Seen Derm Dr. Elliott. He has recommended otezla. Otezla is approved for her now. Slow tapering up because of GI intolerance. Currently on 15mg PO BID. Increase to 15mg in AM and 30mg in PM daily X 2 weeks and then 30mg twice daily. Continue colchicine 1.2mg in AM and 0.6mg in PM daily    Has tried prednisone in the past, but does not tolerate well due to mood issues, and has been off prednisone for the past 6+ months. Has H/o Tourettes. Followed by Dr. Miramontes.     She continues to have GI symptoms  Colonoscopy was negative in the past.  Has gastroparesis.  Behcets may have GI involvement but no evidence of GI inflammation at this time. Dr. Baker has evaluated her.   She gets visual symptoms  But there is no evidence of uveitis including posterior uveitis or retinal vasculitis, denies  symptoms at today's visit. She has seen Dr. Garcia in the past and also seen Dr. Heaton around 2/16      She also likely has fibromyalgia which is uncontrolled. We will address Behcets first.     For chest symptoms, she has been referred to pulmonology by GI.     - Continue Triamcinolone acetonide cream (0.1 percent in Orabase) three to four times daily during attacks of oral ulcers and be used until pain from the ulcer ceases. Potent topical corticosteroids may be used for genital ulcers. Also use magic mouthwash as needed.  - Other comorbidities to watch for in Behcets: Vascular disease in Behçet s is more common in men. Large vessel vascular involvement occurs in approximately one-third of patients with Behcet s disease. Pulmonary artery vasculitis can present with hemoptysis (misdiagnosis can lead to poor prognosis). Secondary fibromyalgia, CNS disease, amyloidosis, sacroiliitis.     RTC 4-6 weeks.     I discussed that I will be moving to [facility] in April 2017. Patient's choice to f/u with Rheumatology at the [facility] or with me in [facility]. Patient would receive a letter regarding this sometime in March 2017. Patient prefers to follow up with me in [facility]. F/u in 4-6 weeks     Left ankle sprain- using boots. F/u with podiatry in 4 weeks.     Data Unavailable    No orders of the defined types were placed in this encounter.      Medications Discontinued During This Encounter   Medication Reason     Colchicine 0.6 MG CAPS Reorder     apremilast (OTEZLA) 30 MG tablet Reorder     Current Outpatient Prescriptions   Medication Sig Dispense Refill     Colchicine 0.6 MG CAPS Take 0.6 mg by mouth See Admin Instructions 2 capsules in AM and 1 capsule in PM 90 capsule 3     apremilast (OTEZLA) 30 MG tablet 15mg in AM and 30mg in PM daily X 2 weeks and then 30mg twice daily. 60 tablet 3     dexamethasone (DECADRON) 4 MG/ML injection Apply 1 mL (4 mg) topically as needed 30 mL 0     order for DME  Equipment being ordered: trilock ankle brace 1 Device 0     OnabotulinumtoxinA (BOTOX IJ) Inject 162.5 Units as directed once Lot #: /C3  Exp: 10/2019       order for DME Equipment being ordered: left boot for ankle and foot immobilization 1 Units 0     LORazepam (ATIVAN) 1 MG tablet Take 1 tablet (1 mg) by mouth every 8 hours as needed for anxiety 90 tablet 0     ondansetron (ZOFRAN-ODT) 4 MG ODT tab Take 1 tablet (4 mg) by mouth 3 times daily as needed for nausea 120 tablet 3     guanFACINE (TENEX) 1 MG tablet 3 tablets in the morning and 3 tablets in the evening 180 tablet 3     norgestimate-ethinyl estradiol (ORTHO-CYCLEN, SPRINTEC) 0.25-35 MG-MCG per tablet Take 1 tablet by mouth daily 84 tablet 3     LORazepam (ATIVAN) 1 MG tablet Take 1-2 tablets (1-2 mg) by mouth every 8 hours as needed for other (abdominal pain) Do not operate a vehicle after taking this medication 90 tablet 0     lubiprostone (AMITIZA) 24 MCG capsule Take 1 capsule (24 mcg) by mouth 2 times daily (with meals) 60 capsule 3     albuterol (PROAIR HFA/PROVENTIL HFA/VENTOLIN HFA) 108 (90 BASE) MCG/ACT Inhaler Inhale 2 puffs into the lungs every 6 hours as needed for shortness of breath / dyspnea or wheezing 1 Inhaler 1     dicyclomine (BENTYL) 10 MG capsule Take 1-2 capsules (10-20 mg) by mouth 2 times daily as needed 120 capsule 3     amitriptyline (ELAVIL) 25 MG tablet Take 1 tablet (25 mg) by mouth At Bedtime May increase to 2, if no benefit after 2 weeks.  Feel free to call / page the nurse for Dr. Mcmahon at [phone] / [phone] with questions regarding this order. 45 tablet 3     OnabotulinumtoxinA (BOTOX IJ) Inject 150 Units into the muscle Lot # /[id number]  Exp: 8/2019       SUMAtriptan (IMITREX) 100 MG tablet Take 1 tablet (100 mg) by mouth at onset of headache for migraine May repeat in 2 hours. Max 2 tablets/24 hours. 18 tablet 3     promethazine (PHENERGAN) 25 MG tablet Take 0.5-1 tablets (12.5-25 mg) by mouth every 6  hours as needed for nausea 20 tablet 1     sucralfate (CARAFATE) 1 GM/10ML suspension Take 10 mLs (1 g) by mouth 4 times daily 1200 mL 2     metoclopramide (REGLAN) 5 MG tablet Take 1 tablet (5 mg) by mouth 2 times daily as needed 60 tablet 2     meclizine (ANTIVERT) 25 MG tablet Take 1 tablet (25 mg) by mouth every 6 hours as needed for dizziness 30 tablet 1     Apremilast (OTEZLA) 10 & 20 & 30 MG TBPK Take one tablet in the morning on day 1, then take one tablet every 12 hours on days 2 thru 14, according to the instructions on the packet. 27 tablet 0     Magic Mouthwash (FV std formula) lidocaine visc 2% 2.5mL/5mL & maalox/mylanta w/ simeth 2.5mL/5mL & diphenhydrAMINE 5mg/5mL Swish and swallow 10 mLs in mouth every 6 hours as needed for mouth sores 1 Bottle 1     clobetasol (TEMOVATE) 0.05 % cream Apply topically 2 times daily 60 g 0     OnabotulinumtoxinA (BOTOX IJ) Inject 150 Units into the muscle once Lot: /C3 Exp: 05/2019       botulinum toxin type A (BOTOX) 100 UNITS injection Inject 200 Units into the muscle every 3 months 200 Units 3     lidocaine (LIDODERM) 5 % patch Apply up to 3 patches to painful area at once for up to 12 h within a 24 h period.  Remove after 12 hours. 30 patch 1     omeprazole (PRILOSEC) 40 MG capsule Take 1 capsule (40 mg) by mouth daily 30 capsule 9     linaclotide (LINZESS) 290 MCG capsule Take 1 capsule (290 mcg) by mouth every morning (before breakfast) 30 capsule 1     UNABLE TO FIND daily MEDICATION NAME: Peppermint supplement       hyoscyamine (ANASPAZ,LEVSIN) 0.125 MG tablet Take 1-2 tablets (125-250 mcg) by mouth every 4 hours as needed for cramping 40 tablet 1     Aspirin-Acetaminophen-Caffeine (EXCEDRIN MIGRAINE PO) Take by mouth as needed        hypromellose (GENTEAL) 0.3 % SOLN 1 drop every hour as needed       betamethasone valerate (VALISONE) 0.1 % cream Apply topically 2 times daily       carbamide peroxide (DEBROX) 6.5 % otic solution 5 drops 2 times daily        psyllium (METAMUCIL SMOOTH TEXTURE) 63 % POWD Take 3 teaspoonful by mouth 3 times daily Mix in 8 ounces of water 1 Bottle 11     polyethylene glycol (MIRALAX/GLYCOLAX) powder Take 17 g by mouth 2 times daily       Lactase (LACTAID PO) Take by mouth daily       multivitamin, therapeutic with minerals (MULTI-VITAMIN) TABS Take 1 tablet by mouth daily Reported on 3/21/2017       Pyridoxine HCl (VITAMIN B6 PO)        dicyclomine (BENTYL) 20 MG tablet Take 1 tablet (20 mg) by mouth every 6 hours 90 tablet 1     lidocaine (XYLOCAINE) 2 % jelly Apply 1 Tube topically daily       triamcinolone (KENALOG) 0.1 % cream Apply sparingly to oral ulcers three times daily for 14 days as needed. 15 g 1     EPINEPHrine (EPIPEN) 0.3 MG/0.3ML injection Inject 0.3 mLs into the muscle once as needed for anaphylaxis for 1 dose. And call 911. 2 each 1     Ranitidine HCl (RANITIDINE 75 PO) Take  by mouth. As needed for GI upset       Dr.Prabhu Valdez Marquez MD.           Active Problem List:     Patient Active Problem List    Diagnosis Date Noted     Acute left ankle pain 03/31/2017     Priority: Medium     Cervical dystonia 03/28/2017     Priority: Medium     PTSD (post-traumatic stress disorder) 01/17/2017     Priority: Medium     Left knee pain 10/20/2016     Priority: Medium     Patellofemoral instability 10/20/2016     Priority: Medium     Fibromyalgia 08/04/2016     Priority: Medium     Rheumatoid arthritis of multiple sites without rheumatoid factor (H) 08/04/2016     Priority: Medium     Raynaud's disease without gangrene 08/04/2016     Priority: Medium     Chronic abdominal pain 08/04/2016     Priority: Medium     Palpitations 01/12/2016     Priority: Medium     On colchicine therapy 10/30/2015     Priority: Medium     Spell of shaking 05/06/2015     Priority: Medium     Migraine 02/04/2015     Priority: Medium     Problem list name updated by automated process. 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 Symptoms well managed. Originally diagnosed at [facility] neurology. (Dr. Simpson)           IBS (irritable bowel syndrome) 05/18/2009     Priority: Medium     Seasonal allergic rhinitis 05/18/2009     Priority: Medium          History of Present Illness:     Chief Complaint   Patient presents with     RECHECK     Follow up for RA     Seen Dr. Marilyn Moran Rheumatology in [facility] 10/14:    Original presentation 2014:    Ms Oropeza is a 19 y.o. female who presents with one year of presentation of painful oral ulcers (monthly) once that have worsened with two episodes in the last two months that presented with painful vulvar or vaginal ulcers (2 episodes so far every month) [not sure if they leave scar] as well that timing coincides with menses ([facility]: 8/25/14).   ORAL ULCERS: last two episodes were severe, painful, white base with erythematous halo, that appear and disappear in the matter of 1-2 weeks without, does not bleed and doesn't respond to any treatment used (magic mouthwash), 10-15 in number with the biggest one being dime size.     VAGINAL ULCERS: 2-3 in number between labia majora and minora that occur simultaneously with oral ulcers, painful, non bleeding ulcers that are erythematous, no pus.     FOLLICULITIS: patient reports having spots in legs specially around ankle and knee, but arms, and neck are affected as well. Small lesions that resemble ingrown hair, most are red erythematous elevated lesions, well demarcated, some with liquid that patient refers as pimple like.     SKIN  RASHES: welts and red macules with well defined borders that are pruritic and non-ulcerative on chest, arms and back. Benadryl relieves.     ARTHRALGIAS: In descending order of severity: hips, knees, wrists, shoulders, neck, lumbar, fingers.   C/o morning stiffness in hips, back and neck lasting for about until noon.     Ms. Oropeza reports they present in severe flares and never fully resolve, but do get better. She has seen some swelling and warmth on the affected joints but has not noticed and redness. Has tried Tylenol, ibuprofen, and hydrocodone with not much benefit.     FEVERS: Reports 3-4 sporadic episodes per month of self limited fevers of 38 C that occur during the evenings and associate facial flushing.     HEADACHES: occur daily and are bitemporal and irradiate occipitally, pulsatile in nature, intensity varies from intense to mild, are worsened with movement. On treatment with ibuprofen and somatriptan without any positive results.     VISION CHANGES: Patient reports that suddenly she would only see a gray blotch in her right eyes and would persist like this for a day and a half. Also reports blurriness in her left eye. Presence of pain and burning sensation of eyeball with associated redness. Has changed prescription glasses in the matter of 2 years 3 times. She has had opthalmology exam and was not suggestive of any evidence of uveitis.   She was also evaluated in ED for a dizzy spell.     ABD PAIN W/ INTERMITTENT DIARRHEA AND CONSTIPATION: Patient reports abdominal pain that is intermittent, periods of 7 days without bowel movement and feeling bloated with change of pattern to diarrhea (liquidy without blood nor mucus, non foul smelling). Has taken Bentyl, Zegrid, and rinetidine with mild clinical improvement.  She also reports small red nodules after each needle stick for blood draw.   Neurology saw her back then and was not impressed with her presentation as physical examination was normal. Also  MRI brain normal: Findings: No definite hemorrhage, mass affect, midline shift, or ventriculomegaly is noted.There are a few scattered non specific white matter T2 hyperintensities. Axial diffusion weighted images are unremarkable.     The major vascular structures appear patent. There is opacification and severe mucosal thickening in the right maxillary sinus. The remaining paranasal sinuses, and mastoid air cells are clear. Orbits are unremarkable  She has + HSV-1 IGG. Seen ID. Negative for Herpes simplex virus culture. HSV IGM I/II COMBINATION SENT TO [facility]  RESULTS = <0.91  REF RANGE: <0.91 = NEGATIVE  ID did not think HSV could explain her mouth and genital sores.     CRP within normal limits. HIV negative. CBC within normal limits; UA negative. Creatinine within normal limits   CYNDIE neg.  Antigliadin neg.   ANti TTG neg.   [facility] 2014: Colonoscopy and endoscopy neg; result not available. Not sure if biopsies were done.   Raynaud's phenomenon:  Onset: about 2013  Digits: all fingers  Digital ulcers: no  Color changes: white ---> purple and red  Duration of an attack: About couple of hours per mom  Pain during attack: not clear pain but bruise like feeling  Frequency of attacks: few times a month  Family history of Raynauds: no  GERD: very long time    No hemoptysis.   No miscarriages/ no thrombosis in past.   No family or personal history of psoriasis, ulcerative colitis or chron's disease.   No buttock pain or low back pain or stiffness in AM    Interim history:  Dec 2014- Multiple mouth ulcers and did not eat for 5 days. This coincided with periods. Colchicine 0.6mg PO BID started in Nov 2014.   Prednisone taper in Dec 20mg to 0 in 4 weeks. She also used magic mouthwash. Kenalog cream. After going to the ER, 3 days later her ulcers were better. She thinks it improved after the menstruation stopped.   LMP 3 weeks ago.   COLCHICINE HAS HELPED A LOT REDUCING THE INTENSITY OF MENSTRUATION ASSOCIATED ORAL AND  "VULVAR ULCERS.     Interim history: 6/15    Since last visit only two episodes of mouth sores- small sized 6   No genital ulcers since last visit.   Colchicine 1.2 mg in AM and 0.6mg in PM keeps her symptoms under control.     Admitted in 5/15:  Admission Diagnoses:  - LUE weakness  - spell  - hx of migraines  - hx of Tourette syndrome  - hx of Behcet's disease    Discharge Diagnoses:   - spell likely 2/2 anxiety  - hx of migraines  - hx of Tourette syndrome  - hx of Behcet's disease    CT and MRI brain was normal.     Seeing Dr. Lilliana Miramontes soon as outpatient.     Dr. Garcia did not find any intraocular inflammation.      Interm 10/30/2015  ED for migraine. Continues to see neuro - MRI brain without lesions noted. Saw GI - upper barium normal. May be considering repeat colo. Worsening lesions in mouth and genitals. Has had continuous lesion in mouth x 2 months. 2 genital ulcers. Had fracture of right sesamoid in foot. Continues to have low grade fevers. New folliculitis on chest, legs and arms.     Interim hx: 1/11/2016    Pt states that since last visit she continues to have oral ulcers and has noted more folliculitis-like lesions on her lower extremities.  She states that on her right lower leg she had a red macular spot that has since resolved.  She denies uveitis.  She states that the colchicine initially helped but then stopped working.  She takes 0.6 mg TID.  She stopped taking her prednisone last week as she feels like this hasn't helped.  She hasn't had any episodes of vaginal ulcers.      She saw GI who stated she has gastroparesis.  She is seeing Cardiology later this week for possible syncopal episodes.      Interim history 5/16  Mouth ulcers X 1 last month  Genital ulcers - none since last visit.     Bilateral MCPs pain, knee pain and low back pain +ve increased since last visit  Morning stiffness X 2 hours (increasing)   5-6/10    \"overall myalgia\" has gotten worse    C/o pseudofolliculitis " lesion on anterior shins bilaterally worse    Interim history: (7/18/2016)  Patient has just started Humira for 2 doses on 7/1 and 7/15 and reported minimal improvement of her hip pain but otherwise, did not notice anything different.     She reported painful mouth ulcer once a month since last visit but noticed that it has been getting deeper.   Denied any genital ulcers.    Still has ongoing bilateral MCPs pain, knee pain but this has been intermittent and she did not have any much pain today. She reported 2-3 hours morning stiffness of both hands and pain score of 6/10 at her knees and 8/10 of her hands but currently takes no pain medication except prn ativan which she takes when her muscle is really tight.     The only concern is that she gained weight even though she has not been eating much (eat once a day) and do exercises every day for 1 hour (with video of yoga, pilates). Her mother wonders if she needs to have some labs work up include sex hormone, thyroid, cortisol.    Interval history 9/14/16  Patient was started Humira at the last visit, patient reports worsening of skin ulcers since starting Humira and stopped taking Humura for the past 2 weeks. Patient reports oral and genital ulcers has been stable even before starting Humira and has not had any interval oral/genital ulcers. However she reports recurrent skin ulcers occurring on a daily basis that affects her chest and anterior legs. Patient also has stopped taking prednisone, she reports that she doesn't feel the prednisone helps, her last dose of prednisone was 6+ months ago. Patient also report worsening low back pain that sometimes causes her to limp.    In the interval patient also visited ED on 8/31/16 for left hand pain and what the patient describes as phlebitis, no medication or procedure was done and the patient was discharged with a splint. Patient also reported 1 episode of chest pressure that was associated with dyspnea and numbness of  the left arm, she was shopping in a [employer] at the time of onset, and the pressure resolved after she took a nap.    Patient denies any infectious symptoms in the interval, no fever, chills, night sweat, cough, dysuria, denies eye pain or visual changes.    November 4, 2016  Oct - had one genital ulcer  Oral ulcers X 5- around menstruation time.   Knee pain- chronic on the left side  Dizziness spells - on and off; been to the ER for that in the past.   On and off migraines  July 2013 - s/p MPFL reconstruction plus LRL 7/2013  Morning stiffness in knee (left) X 1 hour    Severe jaw pain and chest pain- patient wondering if this is Tourette's or esophageal spasms. Cardiac workup negative.   Fever     January 13, 2017  [place] [facility] [place] orthostatic syncope from dehydration.   Chest pain on and off still continues. Painful with deep breaths.   Oral ulcers - few minor ones lasting for one week;   One major one in roof of mouth lasting for about one week.   Knee pain +ve - reviewed the recent MRI with her orthopedic surgeon.   On and off migraines  otezla is approved. Still waiting to receive the meds.     BP Readings from Last 3 Encounters:   03/31/17 106/73   03/27/17 131/78   03/21/17 110/76     March 31, 2017  Otezla current dose 15mg PO BID.   She is tolerating okay at this dose and is willing to increase the dose further up to 30mg BID.   Her joint pains are better on otezla. Knee pain is also better.   Back and neck pain +ve 8/10 when it is worse. Intramuscular botox injections were given on Monday in PMR.   2 minor genital ulcers in the interim- resolved.   Few oral ulcers in the interim- resolved.   Nasal ulcer - resolved.   Migraines on and off.   Nausea with otezla but improving.   Dizziness and blackouts on and off. She fell once and hurt her ankle. Wearing boot in left leg.   Complete ROS negative except for above         Past Medical History:     Past Medical History:   Diagnosis Date     Anxiety       Behcet's disease (H)      Cervical adenitis May 2010     Chronic abdominal pain      Constipation, chronic 1994     Gastro-oesophageal reflux disease      Gastroparesis      Migraines      Palpitations      Syncope      Tourette's      Past Surgical History:   Procedure Laterality Date     ARTHROSCOPY KNEE WITH PATELLAR REALIGNMENT  7/25/2013    Procedure: ARTHROSCOPY KNEE WITH PATELLAR REALIGNMENT;  Left Knee Arthroscopy, Medial Patellofemoral Ligament Reconstruction with Allograft  ;  Surgeon: Jennifer Acevedo MD;  Location: [facility]     DENTAL SURGERY  1996    Teeth removal     ENDOSCOPY UPPER, COLONOSCOPY, COMBINED  2005     HC ESOPH/GAS REFLUX TEST W NASAL IMPED >1 HR N/A 2/15/2017    Procedure: ESOPHAGEAL IMPEDENCE FUNCTION TEST WITH 24 HOUR PH GREATER THAN 1 HOUR;  Surgeon: Timothy Matta MD;  Location:  GI          Social History:     Social History     Occupational History     Not on file.     Social History Main Topics     Smoking status: Never Smoker     Smokeless tobacco: Never Used     Alcohol use Yes      Comment: seldom     Drug use: No     Sexual activity: No          Family History:     Family History   Problem Relation Age of Onset     Depression Mother      Neurologic Disorder Mother      Migraines, take imitrex injection.  Also in maternal grandmother.       Alcohol/Drug Father      Hypertension Father      Depression Father      Cardiovascular Maternal Grandmother      Depression Maternal Grandmother      Hypertension Maternal Grandmother      Alzheimer Disease Maternal Grandmother      Cardiovascular Maternal Grandfather      Hypertension Maternal Grandfather      Depression Maternal Grandfather      Alcohol/Drug Maternal Grandfather      Cardiovascular Paternal Grandmother      Hypertension Paternal Grandmother      Cardiovascular Paternal Grandfather      Hypertension Paternal Grandfather      Glaucoma No family hx of      Macular Degeneration No family hx of            Allergies:     Allergies   Allergen Reactions     Amoxil [Penicillins] Rash     Dad unsure of reaction.     Contrast Dye Rash     Contrast Media Ready-Box [facility] 04[place]09[place]2014.; Contrast Media Ready-Box [facility] 04/09/2014.  NOTE: this is a contrast media oral with iodine. Premedicate with methylpred standard for IV contrast, request barium contrast for oral contrast.     Kiwi Swelling     Orange Fruit [Citrus] Anaphylaxis     [place] Anaphylaxis, Difficulty breathing and Rash     Milk Protein Extract Hives     Reglan [Metoclopramide] Other (See Comments)     IV dose only, in ER, rapid heart rate.     Ace Inhibitors      Difficulty in breathing and GI upset     Amitiza [Lubiprostone] Nausea and Vomiting     Amoxicillin-Pot Clavulanate      Latex      Midazolam Unknown     parent states that when pt takes this medication, she wakes up being very violent .     Versed      Coming out of pelvic exam at age of 5, was kicking and screaming when coming out of the versed.     Adhesive Tape Rash     Azithromycin Hives and Rash     Cephalexin Itching and Rash     Itchy mouth     Keflex [Cephalexin-Fd&C Yellow #6] Hives          Medications:     Current Outpatient Prescriptions   Medication Sig Dispense Refill     Colchicine 0.6 MG CAPS Take 0.6 mg by mouth See Admin Instructions 2 capsules in AM and 1 capsule in PM 90 capsule 3     apremilast (OTEZLA) 30 MG tablet 15mg in AM and 30mg in PM daily X 2 weeks and then 30mg twice daily. 60 tablet 3     dexamethasone (DECADRON) 4 MG/ML injection Apply 1 mL (4 mg) topically as needed 30 mL 0     order for DME Equipment being ordered: trilock ankle brace 1 Device 0     OnabotulinumtoxinA (BOTOX IJ) Inject 162.5 Units as directed once Lot #: /C3  Exp: 10/2019       order for DME Equipment being ordered: left boot for ankle and foot immobilization 1 Units 0     LORazepam (ATIVAN) 1 MG tablet Take 1 tablet (1 mg) by mouth every 8 hours as needed for anxiety 90 tablet 0     ondansetron  (ZOFRAN-ODT) 4 MG ODT tab Take 1 tablet (4 mg) by mouth 3 times daily as needed for nausea 120 tablet 3     guanFACINE (TENEX) 1 MG tablet 3 tablets in the morning and 3 tablets in the evening 180 tablet 3     norgestimate-ethinyl estradiol (ORTHO-CYCLEN, SPRINTEC) 0.25-35 MG-MCG per tablet Take 1 tablet by mouth daily 84 tablet 3     LORazepam (ATIVAN) 1 MG tablet Take 1-2 tablets (1-2 mg) by mouth every 8 hours as needed for other (abdominal pain) Do not operate a vehicle after taking this medication 90 tablet 0     lubiprostone (AMITIZA) 24 MCG capsule Take 1 capsule (24 mcg) by mouth 2 times daily (with meals) 60 capsule 3     albuterol (PROAIR HFA/PROVENTIL HFA/VENTOLIN HFA) 108 (90 BASE) MCG/ACT Inhaler Inhale 2 puffs into the lungs every 6 hours as needed for shortness of breath / dyspnea or wheezing 1 Inhaler 1     dicyclomine (BENTYL) 10 MG capsule Take 1-2 capsules (10-20 mg) by mouth 2 times daily as needed 120 capsule 3     amitriptyline (ELAVIL) 25 MG tablet Take 1 tablet (25 mg) by mouth At Bedtime May increase to 2, if no benefit after 2 weeks.  Feel free to call / page the nurse for Dr. Mcmahon at [phone] / [phone] with questions regarding this order. 45 tablet 3     OnabotulinumtoxinA (BOTOX IJ) Inject 150 Units into the muscle Lot # /C3  Exp: 8/2019       SUMAtriptan (IMITREX) 100 MG tablet Take 1 tablet (100 mg) by mouth at onset of headache for migraine May repeat in 2 hours. Max 2 tablets/24 hours. 18 tablet 3     promethazine (PHENERGAN) 25 MG tablet Take 0.5-1 tablets (12.5-25 mg) by mouth every 6 hours as needed for nausea 20 tablet 1     sucralfate (CARAFATE) 1 GM/10ML suspension Take 10 mLs (1 g) by mouth 4 times daily 1200 mL 2     metoclopramide (REGLAN) 5 MG tablet Take 1 tablet (5 mg) by mouth 2 times daily as needed 60 tablet 2     meclizine (ANTIVERT) 25 MG tablet Take 1 tablet (25 mg) by mouth every 6 hours as needed for dizziness 30 tablet 1     Apremilast (OTEZLA) 10  & 20 & 30 MG TBPK Take one tablet in the morning on day 1, then take one tablet every 12 hours on days 2 thru 14, according to the instructions on the packet. 27 tablet 0     Magic Mouthwash (FV std formula) lidocaine visc 2% 2.5mL/5mL & maalox/mylanta w/ simeth 2.5mL/5mL & diphenhydrAMINE 5mg/5mL Swish and swallow 10 mLs in mouth every 6 hours as needed for mouth sores 1 Bottle 1     clobetasol (TEMOVATE) 0.05 % cream Apply topically 2 times daily 60 g 0     OnabotulinumtoxinA (BOTOX IJ) Inject 150 Units into the muscle once Lot: /C3 Exp: 05/2019       botulinum toxin type A (BOTOX) 100 UNITS injection Inject 200 Units into the muscle every 3 months 200 Units 3     lidocaine (LIDODERM) 5 % patch Apply up to 3 patches to painful area at once for up to 12 h within a 24 h period.  Remove after 12 hours. 30 patch 1     omeprazole (PRILOSEC) 40 MG capsule Take 1 capsule (40 mg) by mouth daily 30 capsule 9     linaclotide (LINZESS) 290 MCG capsule Take 1 capsule (290 mcg) by mouth every morning (before breakfast) 30 capsule 1     UNABLE TO FIND daily MEDICATION NAME: Peppermint supplement       hyoscyamine (ANASPAZ,LEVSIN) 0.125 MG tablet Take 1-2 tablets (125-250 mcg) by mouth every 4 hours as needed for cramping 40 tablet 1     Aspirin-Acetaminophen-Caffeine (EXCEDRIN MIGRAINE PO) Take by mouth as needed        hypromellose (GENTEAL) 0.3 % SOLN 1 drop every hour as needed       betamethasone valerate (VALISONE) 0.1 % cream Apply topically 2 times daily       carbamide peroxide (DEBROX) 6.5 % otic solution 5 drops 2 times daily       psyllium (METAMUCIL SMOOTH TEXTURE) 63 % POWD Take 3 teaspoonful by mouth 3 times daily Mix in 8 ounces of water 1 Bottle 11     polyethylene glycol (MIRALAX/GLYCOLAX) powder Take 17 g by mouth 2 times daily       Lactase (LACTAID PO) Take by mouth daily       multivitamin, therapeutic with minerals (MULTI-VITAMIN) TABS Take 1 tablet by mouth daily Reported on 3/21/2017        Pyridoxine HCl (VITAMIN B6 PO)        dicyclomine (BENTYL) 20 MG tablet Take 1 tablet (20 mg) by mouth every 6 hours 90 tablet 1     lidocaine (XYLOCAINE) 2 % jelly Apply 1 Tube topically daily       triamcinolone (KENALOG) 0.1 % cream Apply sparingly to oral ulcers three times daily for 14 days as needed. 15 g 1     EPINEPHrine (EPIPEN) 0.3 MG/0.3ML injection Inject 0.3 mLs into the muscle once as needed for anaphylaxis for 1 dose. And call 911. 2 each 1     Ranitidine HCl (RANITIDINE 75 PO) Take  by mouth. As needed for GI upset            Physical Exam:   Blood pressure 106/73, pulse 94, SpO2 97 %, not currently breastfeeding.  Wt Readings from Last 4 Encounters:   03/28/17 69.4 kg (153 lb)   03/27/17 69.4 kg (153 lb)   03/21/17 69.7 kg (153 lb 11.2 oz)   01/17/17 69.4 kg (153 lb)     Constitutional: well-developed, appearing stated age; cooperative  Eyes: nl EOM, PERRLA, vision, conjunctiva, sclera  ENT: No oral ulcer seen.   nl external ears, nose, hearing, lips, teeth, gums, throat  No mucous membrane lesions, normal saliva pool  Neck: no mass or thyroid enlargement  Resp: lungs clear to auscultation,   CV: RRR, no murmurs, rubs or gallops, no edema  GI: no ABD mass or tenderness, no HSM  : not tested  Lymph: no cervical, supraclavicular or epitrochlear nodes  MS: left ankle and lower leg anterior tenderness +ve  All shoulder, elbow, wrist, MCP/PIP/DIP, hip, ankle, and foot MTP/IP joints were examined and  found normal except tenderness on even light touch of both knees with minimal swelling but no warmth. No active synovitis or deformity. Full ROM.  Normal  strength. Fist 100%.  No dactylitis,  tenosynovitis, enthesopathy.  Skin: no nail pitting, alopecia, rash  Psych: nl judgement, orientation, memory, affect.         Data:       Austen Marquez MD

## 2017-03-31 NOTE — NURSING NOTE
"Chief Complaint   Patient presents with     RECHECK     Follow up for RA       Initial /73  Pulse 94  SpO2 97% Estimated body mass index is 27.1 kg/(m^2) as calculated from the following:    Height as of 3/28/17: 1.6 m (5' 3\").    Weight as of 3/28/17: 69.4 kg (153 lb).  Medication Reconciliation: complete   Farzaneh Armas CMA    "

## 2017-03-31 NOTE — MR AVS SNAPSHOT
After Visit Summary   3/31/2017    Samara Oropeza    MRN: [id number]           Patient Information     Date Of Birth          1995        Visit Information        Provider Department      3/31/2017 10:50 AM Brittny James, PT [facility] Physical Therapy        Today's Diagnoses     Acute left ankle pain    -  1       Follow-ups after your visit        Your next 10 appointments already scheduled     Mar 31, 2017  2:00 PM CDT   (Arrive by 1:45 PM)   Return Visit with Austen Marquez MD   [facility] Rheumatology ([facility])    [address]  3rd [facility] [id number] [phone]            Apr 04, 2017  2:00 PM CDT   LUIZ Extremity with Cristy Pineda PTA   [facility] Physical Therapy ([facility]  )    [address] [place] [id number] [phone]            Apr 05, 2017  3:00 PM CDT   Office Visit with Angela Reynolds [facility] ([facility])    [address] [place] [id number] [phone]           Bring a current list of meds and any records pertaining to this visit.  For Physicals, please bring immunization records and any forms needing to be filled out.  Please arrive 10 minutes early to complete paperwork.            Apr 10, 2017  2:40 PM CDT   LUIZ Extremity with Brittny James PT   [facility] ([facility]  )    [address] [place] [address] [phone]            Apr 19, 2017  8:30 AM CDT   Return Visit with Layla Browne, [facility] ([facility])    [address] [id number] [phone]            Apr 21, 2017 11:00 AM CDT   (Arrive by 10:45 AM)   Return Visit with Kacie Almeida,  APRN CNP   [facility] ([facility])    [address]  4th Floor  [facility] [address] [phone]            Apr 24, 2017 11:30 AM CDT   Return Visit with ALISA Burt   [facility] ([facility])    [address] [id number] [phone]            Apr 25, 2017 11:00 AM CDT   Return Visit with Andres Johnson DPM   [facility] ([facility])    [address]  [address]  [facility] [address]   [phone]            Jun 22, 2017  1:50 PM CDT   (Arrive by 1:35 PM)   Return Botox with Frederick Bender MD   [facility] ([facility])    [address]  [address] [facility] [id number] [phone]              Who to contact     If you have questions or need follow up information about today's clinic visit or your schedule please contact [facility] PH[facility]ICAL THERAP[facility] directly at [phone].  Normal or non-critical lab and imaging results will be communicated to you by [employer], letter or phone within 4 business days after the clinic has received the results. If you do not hear from us within 7 days, please contact the clinic through [employer] or phone. If you have a critical or abnormal lab result, we will notify you by phone as soon as possible.  Submit refill requests through [employer] or call your pharmacy and they will forward the refill request to us. Please allow 3 business days for your refill to be completed.          Additional Information About Your Visit        [employer] Information     [employer] gives you secure access to your electronic health record. If you see a primary care provider, you can also send messages to your care team and make appointments. If you have questions, please call your primary care clinic.  If  you do not have a primary care provider, please call [phone] and they will assist you.        Care EveryWhere ID     This is your Care EveryWhere ID. This could be used by other organizations to access your [place] medical records  [id number]         Blood Pressure from Last 3 Encounters:   03/27/17 131/78   03/21/17 110/76   01/17/17 115/81    Weight from Last 3 Encounters:   03/28/17 69.4 kg (153 lb)   03/27/17 69.4 kg (153 lb)   03/21/17 69.7 kg (153 lb 11.2 oz)              We Performed the Following     LUIZ Inital Eval Report     PT Eval, Low Complexity (07514)     Therapeutic Activities     Therapeutic Exercises        Primary Care Provider Office Phone # Fax #    Sonja EVI Laguerre [phone]       [facility] [address] [facility] [address]  [facility] [address]        Thank you!     Thank you for choosing [facility]ERAP[facility]  for your care. Our goal is always to provide you with excellent care. Hearing back from our patients is one way we can continue to improve our services. Please take a few minutes to complete the written survey that you may receive in the mail after your visit with us. Thank you!             Your Updated Medication List - Protect others around you: Learn how to safely use, store and throw away your medicines at www.disposemymeds.org.          This list is accurate as of: 3/31/17  1:40 PM.  Always use your most recent med list.                   Brand Name Dispense Instructions for use    albuterol 108 (90 BASE) MCG/ACT Inhaler    PROAIR HFA/PROVENTIL HFA/VENTOLIN HFA    1 Inhaler    Inhale 2 puffs into the lungs every 6 hours as needed for shortness of breath / dyspnea or wheezing       amitriptyline 25 MG tablet    ELAVIL    45 tablet    Take 1 tablet (25 mg) by mouth At Bedtime May increase to 2, if no benefit after 2 weeks.  Feel free to call / page the nurse for Dr. Mcmahon at [phone] / [phone] with  questions regarding this order.       * apremilast 30 MG tablet    OTEZLA    60 tablet    Take 1 tablet (30 mg) by mouth 2 times daily       * Apremilast 10 & 20 & 30 MG Tbpk    OTEZLA    27 tablet    Take one tablet in the morning on day 1, then take one tablet every 12 hours on days 2 thru 14, according to the instructions on the packet.       betamethasone valerate 0.1 % cream    VALISONE     Apply topically 2 times daily       * botulinum toxin type A 100 UNITS injection    BOTOX    200 Units    Inject 200 Units into the muscle every 3 months       * BOTOX IJ      Inject 150 Units into the muscle once Lot: /C3 Exp: 05/2019       * BOTOX IJ      Inject 150 Units into the muscle Lot # /C3  Exp: 8/2019       * BOTOX IJ      Inject 162.5 Units as directed once Lot #: /C3 Exp: 10/2019       carbamide peroxide 6.5 % otic solution    DEBROX     5 drops 2 times daily       clobetasol 0.05 % cream    TEMOVATE    60 g    Apply topically 2 times daily       Colchicine 0.6 MG Caps     90 capsule    Take 0.6 mg by mouth See Admin Instructions 2 capsules in AM and 1 capsule in PM       dexamethasone 4 MG/ML injection    DECADRON    30 mL    Apply 1 mL (4 mg) topically as needed       * dicyclomine 20 MG tablet    BENTYL    90 tablet    Take 1 tablet (20 mg) by mouth every 6 hours       * dicyclomine 10 MG capsule    BENTYL    120 capsule    Take 1-2 capsules (10-20 mg) by mouth 2 times daily as needed       EPINEPHrine 0.3 MG/0.3ML injection    EPIPEN    2 each    Inject 0.3 mLs into the muscle once as needed for anaphylaxis for 1 dose. And call 911.       EXCEDRIN MIGRAINE PO      Take by mouth as needed       guanFACINE 1 MG tablet    TENEX    180 tablet    3 tablets in the morning and 3 tablets in the evening       hyoscyamine 0.125 MG tablet    ANASPAZ/LEVSIN    40 tablet    Take 1-2 tablets (125-250 mcg) by mouth every 4 hours as needed for cramping       hypromellose 0.3 % Soln ophthalmic solution     GENTEAL     1 drop every hour as needed       LACTAID PO      Take by mouth daily       lidocaine 2 % topical gel    XYLOCAINE     Apply 1 Tube topically daily       lidocaine 5 % Patch    LIDODERM    30 patch    Apply up to 3 patches to painful area at once for up to 12 h within a 24 h period.  Remove after 12 hours.       lidocaine visc 2% 2.5mL/5mL & maalox/mylanta w/ simeth 2.5mL/5mL & diphenhydrAMINE 5mg/5mL Susp suspension    MAGIC Mouthwash    1 Bottle    Swish and swallow 10 mLs in mouth every 6 hours as needed for mouth sores       linaclotide 290 MCG capsule    LINZESS    30 capsule    Take 1 capsule (290 mcg) by mouth every morning (before breakfast)       * LORazepam 1 MG tablet    ATIVAN    90 tablet    Take 1-2 tablets (1-2 mg) by mouth every 8 hours as needed for other (abdominal pain) Do not operate a vehicle after taking this medication       * LORazepam 1 MG tablet    ATIVAN    90 tablet    Take 1 tablet (1 mg) by mouth every 8 hours as needed for anxiety       lubiprostone 24 MCG capsule    AMITIZA    60 capsule    Take 1 capsule (24 mcg) by mouth 2 times daily (with meals)       meclizine 25 MG tablet    ANTIVERT    30 tablet    Take 1 tablet (25 mg) by mouth every 6 hours as needed for dizziness       metoclopramide 5 MG tablet    REGLAN    60 tablet    Take 1 tablet (5 mg) by mouth 2 times daily as needed       Multi-vitamin Tabs tablet      Take 1 tablet by mouth daily Reported on 3/21/2017       norgestimate-ethinyl estradiol 0.25-35 MG-MCG per tablet    ORTHO-CYCLEN, SPRINTEC    84 tablet    Take 1 tablet by mouth daily       omeprazole 40 MG capsule    priLOSEC    30 capsule    Take 1 capsule (40 mg) by mouth daily       ondansetron 4 MG ODT tab    ZOFRAN-ODT    120 tablet    Take 1 tablet (4 mg) by mouth 3 times daily as needed for nausea       * order for DME     1 Units    Equipment being ordered: left boot for ankle and foot immobilization       * order for DME     1 Device    Equipment  being ordered: trilock ankle brace       polyethylene glycol powder    MIRALAX/GLYCOLAX     Take 17 g by mouth 2 times daily       promethazine 25 MG tablet    PHENERGAN    20 tablet    Take 0.5-1 tablets (12.5-25 mg) by mouth every 6 hours as needed for nausea       psyllium 63 % Powd    METAMUCIL SMOOTH TEXTURE    1 Bottle    Take 3 teaspoonful by mouth 3 times daily Mix in 8 ounces of water       RANITIDINE 75 PO      Take  by mouth. As needed for GI upset       sucralfate 1 GM/10ML suspension    CARAFATE    1200 mL    Take 10 mLs (1 g) by mouth 4 times daily       SUMAtriptan 100 MG tablet    IMITREX    18 tablet    Take 1 tablet (100 mg) by mouth at onset of headache for migraine May repeat in 2 hours. Max 2 tablets/24 hours.       triamcinolone 0.1 % cream    KENALOG    15 g    Apply sparingly to oral ulcers three times daily for 14 days as needed.       UNABLE TO FIND      daily MEDICATION NAME: Peppermint supplement       VITAMIN B6 PO          * Notice:  This list has 12 medication(s) that are the same as other medications prescribed for you. Read the directions carefully, and ask your doctor or other care provider to review them with you.

## 2017-03-31 NOTE — PATIENT INSTRUCTIONS
If you prefer to follow up with me in [place], please call [phone] sometime after April 18 2017 to set up an appointment. F/u in 4-6 weeks

## 2017-03-31 NOTE — MR AVS SNAPSHOT
After Visit Summary   3/31/2017    Samara Oropeza    MRN: [id number]           Patient Information     Date Of Birth          1995        Visit Information        Provider Department      3/31/2017 2:00 PM Austen Marquez MD [facility]ology        Today's Diagnoses     Behcet's syndrome (H)        Behcet's disease (H)          Care Instructions    If you prefer to follow up with me in [facility], please call [phone] sometime after April 18 2017 to set up an appointment. F/u in 4-6 weeks          Follow-ups after your visit        Your next 10 appointments already scheduled     Apr 04, 2017  2:00 PM CDT   LUIZ Extremity with Cristy Pineda [facility] ([facility]  )    [address] [place] [address] [phone]            Apr 05, 2017  3:00 PM CDT   [facility])    [address] [place] [address] [phone]           Bring a current list of meds and any records pertaining to this visit.  For Physicals, please bring immunization records and any forms needing to be filled out.  Please arrive 10 minutes early to complete paperwork.            Apr 10, 2017  2:40 PM CDT   LUIZ Extremity with Brittny James, [facility] Therapy ([facility]  )    [address] [place] [address] [phone]            Apr 19, 2017  8:30 AM CDT   Return Visit with ALISA Burt   Located within [facility] ([facility])    [address] [address] [phone]            Apr 21, 2017 11:00 AM CDT   (Arrive by 10:45 AM)   [facility] ([facility])    [address] [id number] [phone]            Apr 24, 2017 11:30 AM CDT   Return Visit with ALISA Burt   [facility] ([facility])    [address] [id number] [phone]            Apr 25, 2017 11:00 AM CDT   Return Visit with Andres Johnson DPM   [facility] ([facility])    [address] [facility]  [address]  [facility] [address]   [phone]            Jun 22, 2017  1:50 PM CDT   (Arrive by 1:35 PM)   Return Botox with Frederick Bender MD   [facility] ([facility])    [address] [place] [id number] [phone]              Who to contact     If you have questions or need follow up information about today's clinic visit or your schedule please contact [facility] RHEUMATOLOGY directly at [phone].  Normal or non-critical lab and imaging results will be communicated to you by Baculahart, letter or phone within 4 business days after the clinic has received the results. If you do not hear from us within 7 days, please contact the clinic through [employer] or phone. If you have a critical or abnormal lab result, we will notify you by phone as soon as possible.  Submit refill requests through [employer] or call your pharmacy and they will forward the refill request to us. Please allow 3 business days for your refill to be completed.          Additional Information About Your Visit        [employer] Information     [employer] gives you secure access to your electronic health record. If you see a primary care provider, you can also send messages to your care team and make appointments. If you have questions, please call your primary care clinic.  If you do not have a primary care provider, please call [phone] and they will assist you.        Care  EveryWhere ID     This is your Care EveryWhere ID. This could be used by other organizations to access your [facility] medical records  [id number]        Your Vitals Were     Pulse Pulse Oximetry                94 97%           Blood Pressure from Last 3 Encounters:   03/31/17 106/73   03/27/17 131/78   03/21/17 110/76    Weight from Last 3 Encounters:   03/28/17 69.4 kg (153 lb)   03/27/17 69.4 kg (153 lb)   03/21/17 69.7 kg (153 lb 11.2 oz)              Today, you had the following     No orders found for display         Today's Medication Changes          These changes are accurate as of: 3/31/17  2:34 PM.  If you have any questions, ask your nurse or doctor.               These medicines have changed or have updated prescriptions.        Dose/Directions    * Apremilast 10 & 20 & 30 MG Tbpk   Commonly known as:  OTEZLA   This may have changed:  Another medication with the same name was changed. Make sure you understand how and when to take each.   Used for:  Behcet's disease (H)   Changed by:  Esau Elliott MD        Take one tablet in the morning on day 1, then take one tablet every 12 hours on days 2 thru 14, according to the instructions on the packet.   Quantity:  27 tablet   Refills:  0       * apremilast 30 MG tablet   Commonly known as:  OTEZLA   This may have changed:    - how much to take  - how to take this  - when to take this  - additional instructions   Used for:  Behcet's disease (H)   Changed by:  Austen Marquez MD        15mg in AM and 30mg in PM daily X 2 weeks and then 30mg twice daily.   Quantity:  60 tablet   Refills:  3       * Notice:  This list has 2 medication(s) that are the same as other medications prescribed for you. Read the directions carefully, and ask your doctor or other care provider to review them with you.         Where to get your medicines      These medications were sent to [facility] - [place], [place] - [address]   [address] , [facility] [address]    Hours:  Mon-Fri 8:30am-5:00pm Toll Free [phone] Phone:  [phone]     apremilast 30 MG tablet         These medications were sent to [facility] - [place], [place] - [address], [place], [facility] [address]     Phone:  [phone]     Colchicine 0.6 MG Caps                Primary Care Provider Office Phone # Fax #    Sonja Annabella Abreu, EVI MATT [phone]       [facility] [address]  [facility] [address]        Thank you!     Thank you for choosing [facility]  for your care. Our goal is always to provide you with excellent care. Hearing back from our patients is one way we can continue to improve our services. Please take a few minutes to complete the written survey that you may receive in the mail after your visit with us. Thank you!             Your Updated Medication List - Protect others around you: Learn how to safely use, store and throw away your medicines at www.disposemymeds.org.          This list is accurate as of: 3/31/17  2:34 PM.  Always use your most recent med list.                   Brand Name Dispense Instructions for use    albuterol 108 (90 BASE) MCG/ACT Inhaler    PROAIR HFA/PROVENTIL HFA/VENTOLIN HFA    1 Inhaler    Inhale 2 puffs into the lungs every 6 hours as needed for shortness of breath / dyspnea or wheezing       amitriptyline 25 MG tablet    ELAVIL    45 tablet    Take 1 tablet (25 mg) by mouth At Bedtime May increase to 2, if no benefit after 2 weeks.  Feel free to call / page the nurse for Dr. Mcmahon at [phone] / [phone] with questions regarding this order.       * Apremilast 10 & 20 & 30 MG Tbpk    OTEZLA    27 tablet    Take one tablet in the morning on day 1, then take one tablet every 12 hours on days 2 thru 14, according to the instructions on the packet.       * apremilast 30 MG tablet    OTEZLA    60 tablet     15mg in AM and 30mg in PM daily X 2 weeks and then 30mg twice daily.       betamethasone valerate 0.1 % cream    VALISONE     Apply topically 2 times daily       * botulinum toxin type A 100 UNITS injection    BOTOX    200 Units    Inject 200 Units into the muscle every 3 months       * BOTOX IJ      Inject 150 Units into the muscle once Lot: /C3 Exp: 05/2019       * BOTOX IJ      Inject 150 Units into the muscle Lot # /C3  Exp: 8/2019       * BOTOX IJ      Inject 162.5 Units as directed once Lot #: /C3 Exp: 10/2019       carbamide peroxide 6.5 % otic solution    DEBROX     5 drops 2 times daily       clobetasol 0.05 % cream    TEMOVATE    60 g    Apply topically 2 times daily       Colchicine 0.6 MG Caps     90 capsule    Take 0.6 mg by mouth See Admin Instructions 2 capsules in AM and 1 capsule in PM       dexamethasone 4 MG/ML injection    DECADRON    30 mL    Apply 1 mL (4 mg) topically as needed       * dicyclomine 20 MG tablet    BENTYL    90 tablet    Take 1 tablet (20 mg) by mouth every 6 hours       * dicyclomine 10 MG capsule    BENTYL    120 capsule    Take 1-2 capsules (10-20 mg) by mouth 2 times daily as needed       EPINEPHrine 0.3 MG/0.3ML injection    EPIPEN    2 each    Inject 0.3 mLs into the muscle once as needed for anaphylaxis for 1 dose. And call 911.       EXCEDRIN MIGRAINE PO      Take by mouth as needed       guanFACINE 1 MG tablet    TENEX    180 tablet    3 tablets in the morning and 3 tablets in the evening       hyoscyamine 0.125 MG tablet    ANASPAZ/LEVSIN    40 tablet    Take 1-2 tablets (125-250 mcg) by mouth every 4 hours as needed for cramping       hypromellose 0.3 % Soln ophthalmic solution    GENTEAL     1 drop every hour as needed       LACTAID PO      Take by mouth daily       lidocaine 2 % topical gel    XYLOCAINE     Apply 1 Tube topically daily       lidocaine 5 % Patch    LIDODERM    30 patch    Apply up to 3 patches to painful area at once for up to 12 h  within a 24 h period.  Remove after 12 hours.       lidocaine visc 2% 2.5mL/5mL & maalox/mylanta w/ simeth 2.5mL/5mL & diphenhydrAMINE 5mg/5mL Susp suspension    MAGIC Mouthwash    1 Bottle    Swish and swallow 10 mLs in mouth every 6 hours as needed for mouth sores       linaclotide 290 MCG capsule    LINZESS    30 capsule    Take 1 capsule (290 mcg) by mouth every morning (before breakfast)       * LORazepam 1 MG tablet    ATIVAN    90 tablet    Take 1-2 tablets (1-2 mg) by mouth every 8 hours as needed for other (abdominal pain) Do not operate a vehicle after taking this medication       * LORazepam 1 MG tablet    ATIVAN    90 tablet    Take 1 tablet (1 mg) by mouth every 8 hours as needed for anxiety       lubiprostone 24 MCG capsule    AMITIZA    60 capsule    Take 1 capsule (24 mcg) by mouth 2 times daily (with meals)       meclizine 25 MG tablet    ANTIVERT    30 tablet    Take 1 tablet (25 mg) by mouth every 6 hours as needed for dizziness       metoclopramide 5 MG tablet    REGLAN    60 tablet    Take 1 tablet (5 mg) by mouth 2 times daily as needed       Multi-vitamin Tabs tablet      Take 1 tablet by mouth daily Reported on 3/21/2017       norgestimate-ethinyl estradiol 0.25-35 MG-MCG per tablet    ORTHO-CYCLEN, SPRINTEC    84 tablet    Take 1 tablet by mouth daily       omeprazole 40 MG capsule    priLOSEC    30 capsule    Take 1 capsule (40 mg) by mouth daily       ondansetron 4 MG ODT tab    ZOFRAN-ODT    120 tablet    Take 1 tablet (4 mg) by mouth 3 times daily as needed for nausea       * order for DME     1 Units    Equipment being ordered: left boot for ankle and foot immobilization       * order for DME     1 Device    Equipment being ordered: trilock ankle brace       polyethylene glycol powder    MIRALAX/GLYCOLAX     Take 17 g by mouth 2 times daily       promethazine 25 MG tablet    PHENERGAN    20 tablet    Take 0.5-1 tablets (12.5-25 mg) by mouth every 6 hours as needed for nausea        psyllium 63 % Powd    METAMUCIL SMOOTH TEXTURE    1 Bottle    Take 3 teaspoonful by mouth 3 times daily Mix in 8 ounces of water       RANITIDINE 75 PO      Take  by mouth. As needed for GI upset       sucralfate 1 GM/10ML suspension    CARAFATE    1200 mL    Take 10 mLs (1 g) by mouth 4 times daily       SUMAtriptan 100 MG tablet    IMITREX    18 tablet    Take 1 tablet (100 mg) by mouth at onset of headache for migraine May repeat in 2 hours. Max 2 tablets/24 hours.       triamcinolone 0.1 % cream    KENALOG    15 g    Apply sparingly to oral ulcers three times daily for 14 days as needed.       UNABLE TO FIND      daily MEDICATION NAME: Peppermint supplement       VITAMIN B6 PO          * Notice:  This list has 12 medication(s) that are the same as other medications prescribed for you. Read the directions carefully, and ask your doctor or other care provider to review them with you.

## 2017-03-31 NOTE — PROGRESS NOTES
[facility] Initial Evaluation          Subjective:    Samara Oropeza is a 21 year old female with a left ankle condition.  Condition occurred with:  A fall/slip (Pt. fell down the stairs at home 3-7 sustaining a L ankle sprain. Pt. started using a CAM boot last week due to ongoing pain with ambulation. X-rays are (-). No hx of ankle injury.).  Condition occurred: at home.  This is a new condition  3-7-17.    Patient reports pain:  Lateral.  Radiates to:  No radiation.  Pain is described as sharp and is intermittent and reported as 6/10.  Associated symptoms:  Loss of motion/stiffness and loss of strength. Pain is the same all the time.  Symptoms are exacerbated by standing, walking, ascending stairs and descending stairs and relieved by rest and bracing/immobilizing.  Since onset symptoms are gradually improving.  Special tests:  X-ray.  Previous treatment: none.    General health as reported by patient is fair.                                              Objective:    Standing Alignment:                Ankle/Foot:  Normal    Gait:  Mod limp on L w/out boot  Gait Type:  Antalgic         Flexibility/Screens:       Lower Extremity:  Decreased left lower extremity flexibility:Gastroc and Soleus            Ankle/Foot Evaluation  ROM:    AROM:    Dorsiflexion:  Left:   -9  Right:   17  Plantarflexion:  Left:  70    Right:  80  Inversion:  Left:  20     Right:  55  Eversion:  14     Right:  25      PROM:    Dorsiflexion: Left:    0     Right:                   Strength:    Dorsiflexion:  Left: 4-/5     Pain:     Plantarflexion: Left: 4-/5   Pain:     Inversion:Left: 4-/5  Pain:       Eversion:Left: 3+/5  Pain:                    LIGAMENT TESTING:   Anterior Drawer (ATF) Left: Gr I     Posterior Drawer (PTF) Left: neg     Varus Stress (Calc Fib) Left: neg      Valgus Stress (Deltoid) Left: neg            PALPATION:   Left ankle tenderness present at:  anterior talofibular ligament; posterior  talofibular ligament and calcaneofibular ligament    EDEMA: Edema ankle:  no edema noted.          MOBILITY TESTING: normal                                                                General     ROS    Assessment/Plan:      Patient is a 21 year old female with left side ankle complaints.    Patient has the following significant findings with corresponding treatment plan.                Diagnosis 1:  L ankle sprain  Pain -  self management and education  Decreased ROM/flexibility - manual therapy and therapeutic exercise  Decreased strength - therapeutic exercise and therapeutic activities  Decreased proprioception - neuro re-education and therapeutic activities  Impaired gait - gait training  Impaired muscle performance - neuro re-education  Decreased function - therapeutic activities    Therapy Evaluation Codes:   1) History comprised of:   Personal factors that impact the plan of care:      None.    Comorbidity factors that impact the plan of care are:      None.     Medications impacting care: None.  2) Examination of Body Systems comprised of:   Body structures and functions that impact the plan of care:      Ankle.   Activity limitations that impact the plan of care are:      Standing and Walking.  3) Clinical presentation characteristics are:   Stable/Uncomplicated.  4) Decision-Making    Low complexity using standardized patient assessment instrument and/or measureable assessment of functional outcome.  Cumulative Therapy Evaluation is: Low complexity.    Previous and current functional limitations:  (See Goal Flow Sheet for this information)    Short term and Long term goals: (See Goal Flow Sheet for this information)     Communication ability:  Patient appears to be able to clearly communicate and understand verbal and written communication and follow directions correctly.  Treatment Explanation - The following has been discussed with the patient:   RX ordered/plan of care  Anticipated  outcomes  Possible risks and side effects  This patient would benefit from PT intervention to resume normal activities.   Rehab potential is good.    Frequency:  1 X week, once daily  Duration:  for 6 weeks  Discharge Plan:  Achieve all LTG.  Independent in home treatment program.  Reach maximal therapeutic benefit.    Please refer to the daily flowsheet for treatment today, total treatment time and time spent performing 1:1 timed codes.

## 2017-04-03 DIAGNOSIS — M35.2 BEHCET'S DISEASE (H): ICD-10-CM

## 2017-04-03 NOTE — TELEPHONE ENCOUNTER
Called back pharmacist to discuss Otezla Rx. Pharmacist informed that Otezla does not come in 15mg tablets, and that the  has strong warnings against cutting the pills in half. Available strengths are 10mg, 20mg and 30mg.    Pharmacy would like clarification on what plan for this Rx should be. Can electronically Re-Rx to [email or web address] mail order or call in to [phone].    Will route to Dr. Marquez.

## 2017-04-03 NOTE — PROGRESS NOTES
Subjective:                                       Pertinent medical history includes:  Rheumatoid arthritis, depression, fibromyalgia, migraines and other (sleep disorder/apnea, numbness/tingling, pain at night/rest, changes in skin color, dizziness/ concussions, chest pain, weakness, calf pain, bechets).  Medical allergies: yes (adhesive).  Other surgeries include:  Orthopedic surgery (left knee).  Current medications:  Anti-inflammatory, muscle relaxants and other (intramuscular botox injections, otezla, inhaler, benzodiazepines).  Current occupation is PCA, student.    Primary job tasks include:  Prolonged standing, lifting and repetitive tasks.                                Objective:    System    Physical Exam    General     ROS    Assessment/Plan:

## 2017-04-03 NOTE — TELEPHONE ENCOUNTER
The prescription sent to the pharmacy on Friday did include taking 15 mg. Rx rewritten and forwarded to Dr Marquez for completion.    MARIAN RabagoN RN  Rheumatology RN Coordinator  [facility]

## 2017-04-03 NOTE — TELEPHONE ENCOUNTER
Currently on 15mg PO BID. Increase to 20 mg in AM and 30mg in PM daily X 2 weeks and then 30mg twice daily.  Thanks  Austen Marquez MD

## 2017-04-04 ENCOUNTER — THERAPY VISIT (OUTPATIENT)
Dept: PHYSICAL THERAPY | Facility: CLINIC | Age: 22
End: 2017-04-04
Payer: COMMERCIAL

## 2017-04-04 DIAGNOSIS — M25.572 ACUTE LEFT ANKLE PAIN: ICD-10-CM

## 2017-04-04 PROCEDURE — 97033 APP MDLTY 1+IONTPHRSIS EA 15: CPT | Mod: GP

## 2017-04-04 PROCEDURE — 97110 THERAPEUTIC EXERCISES: CPT | Mod: GP

## 2017-04-04 PROCEDURE — 97140 MANUAL THERAPY 1/> REGIONS: CPT | Mod: GP

## 2017-04-05 DIAGNOSIS — R55 SYNCOPE, UNSPECIFIED SYNCOPE TYPE: Primary | ICD-10-CM

## 2017-04-10 ENCOUNTER — THERAPY VISIT (OUTPATIENT)
Dept: PHYSICAL THERAPY | Facility: CLINIC | Age: 22
End: 2017-04-10
Payer: COMMERCIAL

## 2017-04-10 DIAGNOSIS — M25.572 ACUTE LEFT ANKLE PAIN: ICD-10-CM

## 2017-04-10 PROCEDURE — 97110 THERAPEUTIC EXERCISES: CPT | Mod: GP | Performed by: PHYSICAL THERAPIST

## 2017-04-10 PROCEDURE — 97530 THERAPEUTIC ACTIVITIES: CPT | Mod: GP | Performed by: PHYSICAL THERAPIST

## 2017-04-10 PROCEDURE — 97033 APP MDLTY 1+IONTPHRSIS EA 15: CPT | Mod: GP | Performed by: PHYSICAL THERAPIST

## 2017-04-10 PROCEDURE — G0283 ELEC STIM OTHER THAN WOUND: HCPCS | Mod: GP | Performed by: PHYSICAL THERAPIST

## 2017-04-10 NOTE — MR AVS SNAPSHOT
After Visit Summary   4/10/2017    Samara Oropeza    MRN: [id number]           Patient Information     Date Of Birth          1995        Visit Information        Provider Department      4/10/2017 2:40 PM Brittny James PT [facility] Physical Therapy        Today's Diagnoses     Acute left ankle pain           Follow-ups after your visit        Your next 10 appointments already scheduled     Apr 18, 2017 10:10 AM CDT   LUIZ Extremity with Brittny James PT   [facility] ([facility]  )    [address] [place] [id number]   [phone]            Apr 19, 2017  8:30 AM CDT   Return Visit with ALISA Burt   [facility] ([facility])    [address] [place] [address] [phone]            Apr 19, 2017  2:30 PM CDT   Office Visit with Angela Reynolds Rohini   [facility] ([facility])    [address] [place] [address] [phone]           Bring a current list of meds and any records pertaining to this visit.  For Physicals, please bring immunization records and any forms needing to be filled out.  Please arrive 10 minutes early to complete paperwork.            Apr 21, 2017 11:00 AM CDT   (Arrive by 10:45 AM)   Return Visit with EVI Vizcaino [facility] ([facility])    [address] [id number] [phone]            Apr 24, 2017 11:30 AM CDT   Return Visit with ALISA Burt   [facility] ([facility])    [address] [place] [address] [phone]            Apr 25, 2017 11:00 AM CDT   Return Visit with Andres Johnson,  DPM   [facility] ([facility])    [address]  [address]  [facility] [address]   [phone]            Jun 22, 2017  1:50 PM CDT   (Arrive by 1:35 PM)   Return Botox with Frederick Bender MD   [facility] ([facility])    [address] [facility]  3rd Floor  [facility] [address] [phone]              Who to contact     If you have questions or need follow up information about today's clinic visit or your schedule please contact [place] [facility] directly at [phone].  Normal or non-critical lab and imaging results will be communicated to you by Synchronyhart, letter or phone within 4 business days after the clinic has received the results. If you do not hear from us within 7 days, please contact the clinic through Synchronyhart or phone. If you have a critical or abnormal lab result, we will notify you by phone as soon as possible.  Submit refill requests through XDx or call your pharmacy and they will forward the refill request to us. Please allow 3 business days for your refill to be completed.          Additional Information About Your Visit        XDx Information     XDx gives you secure access to your electronic health record. If you see a primary care provider, you can also send messages to your care team and make appointments. If you have questions, please call your primary care clinic.  If you do not have a primary care provider, please call [phone] and they will assist you.        Care EveryWhere ID     This is your Care EveryWhere ID. This could be used by other organizations to access your [facility] medical records  [id number]         Blood Pressure from Last 3 Encounters:   03/31/17 106/73   03/27/17 131/78   03/21/17 110/76    Weight from Last 3 Encounters:   03/28/17 69.4 kg (153 lb)   03/27/17 69.4 kg (153 lb)   03/21/17 69.7 kg (153 lb  11.2 oz)              We Performed the Following     Electric Stim Unattended     Iontophoresis     Therapeutic Activities     Therapeutic Exercises        Primary Care Provider Office Phone # Fax #    EVI Cardona [facility] [phone]       [facility] [address] [facility] [address]  [facility] [address]        Thank you!     Thank you for choosing [place] [facility]  for your care. Our goal is always to provide you with excellent care. Hearing back from our patients is one way we can continue to improve our services. Please take a few minutes to complete the written survey that you may receive in the mail after your visit with us. Thank you!             Your Updated Medication List - Protect others around you: Learn how to safely use, store and throw away your medicines at www.disposemymeds.org.          This list is accurate as of: 4/10/17  3:55 PM.  Always use your most recent med list.                   Brand Name Dispense Instructions for use    albuterol 108 (90 BASE) MCG/ACT Inhaler    PROAIR HFA/PROVENTIL HFA/VENTOLIN HFA    1 Inhaler    Inhale 2 puffs into the lungs every 6 hours as needed for shortness of breath / dyspnea or wheezing       amitriptyline 25 MG tablet    ELAVIL    45 tablet    Take 1 tablet (25 mg) by mouth At Bedtime May increase to 2, if no benefit after 2 weeks.  Feel free to call / page the nurse for Dr. Mcmahon at [phone] / [phone] with questions regarding this order.       * Apremilast 10 & 20 & 30 MG Tbpk    OTEZLA    27 tablet    Take one tablet in the morning on day 1, then take one tablet every 12 hours on days 2 thru 14, according to the instructions on the packet.       * apremilast 30 MG tablet    OTEZLA    60 tablet    20 mg in AM and 30mg in PM daily X 2 weeks and then 30mg twice daily.       betamethasone valerate 0.1 % cream    VALISONE     Apply topically 2 times daily       * botulinum toxin type A  100 UNITS injection    BOTOX    200 Units    Inject 200 Units into the muscle every 3 months       * BOTOX IJ      Inject 150 Units into the muscle once Lot: /C3 Exp: 05/2019       * BOTOX IJ      Inject 150 Units into the muscle Lot # /C3  Exp: 8/2019       * BOTOX IJ      Inject 162.5 Units as directed once Lot #: /C3 Exp: 10/2019       carbamide peroxide 6.5 % otic solution    DEBROX     5 drops 2 times daily       clobetasol 0.05 % cream    TEMOVATE    60 g    Apply topically 2 times daily       Colchicine 0.6 MG Caps     90 capsule    Take 0.6 mg by mouth See Admin Instructions 2 capsules in AM and 1 capsule in PM       dexamethasone 4 MG/ML injection    DECADRON    30 mL    Apply 1 mL (4 mg) topically as needed       * dicyclomine 20 MG tablet    BENTYL    90 tablet    Take 1 tablet (20 mg) by mouth every 6 hours       * dicyclomine 10 MG capsule    BENTYL    120 capsule    Take 1-2 capsules (10-20 mg) by mouth 2 times daily as needed       EPINEPHrine 0.3 MG/0.3ML injection    EPIPEN    2 each    Inject 0.3 mLs into the muscle once as needed for anaphylaxis for 1 dose. And call 911.       EXCEDRIN MIGRAINE PO      Take by mouth as needed       guanFACINE 1 MG tablet    TENEX    180 tablet    3 tablets in the morning and 3 tablets in the evening       hyoscyamine 0.125 MG tablet    ANASPAZ/LEVSIN    40 tablet    Take 1-2 tablets (125-250 mcg) by mouth every 4 hours as needed for cramping       hypromellose 0.3 % Soln ophthalmic solution    GENTEAL     1 drop every hour as needed       LACTAID PO      Take by mouth daily       lidocaine 2 % topical gel    XYLOCAINE     Apply 1 Tube topically daily       lidocaine 5 % Patch    LIDODERM    30 patch    Apply up to 3 patches to painful area at once for up to 12 h within a 24 h period.  Remove after 12 hours.       lidocaine visc 2% 2.5mL/5mL & maalox/mylanta w/ simeth 2.5mL/5mL & diphenhydrAMINE 5mg/5mL Susp suspension    MAGIC Mouthwash    1 Bottle     Swish and swallow 10 mLs in mouth every 6 hours as needed for mouth sores       linaclotide 290 MCG capsule    LINZESS    30 capsule    Take 1 capsule (290 mcg) by mouth every morning (before breakfast)       * LORazepam 1 MG tablet    ATIVAN    90 tablet    Take 1-2 tablets (1-2 mg) by mouth every 8 hours as needed for other (abdominal pain) Do not operate a vehicle after taking this medication       * LORazepam 1 MG tablet    ATIVAN    90 tablet    Take 1 tablet (1 mg) by mouth every 8 hours as needed for anxiety       lubiprostone 24 MCG capsule    AMITIZA    60 capsule    Take 1 capsule (24 mcg) by mouth 2 times daily (with meals)       meclizine 25 MG tablet    ANTIVERT    30 tablet    Take 1 tablet (25 mg) by mouth every 6 hours as needed for dizziness       metoclopramide 5 MG tablet    REGLAN    60 tablet    Take 1 tablet (5 mg) by mouth 2 times daily as needed       Multi-vitamin Tabs tablet      Take 1 tablet by mouth daily Reported on 3/21/2017       norgestimate-ethinyl estradiol 0.25-35 MG-MCG per tablet    ORTHO-CYCLEN, SPRINTEC    84 tablet    Take 1 tablet by mouth daily       omeprazole 40 MG capsule    priLOSEC    30 capsule    Take 1 capsule (40 mg) by mouth daily       ondansetron 4 MG ODT tab    ZOFRAN-ODT    120 tablet    Take 1 tablet (4 mg) by mouth 3 times daily as needed for nausea       * order for DME     1 Units    Equipment being ordered: left boot for ankle and foot immobilization       * order for DME     1 Device    Equipment being ordered: trilock ankle brace       polyethylene glycol powder    MIRALAX/GLYCOLAX     Take 17 g by mouth 2 times daily       promethazine 25 MG tablet    PHENERGAN    20 tablet    Take 0.5-1 tablets (12.5-25 mg) by mouth every 6 hours as needed for nausea       psyllium 63 % Powd    METAMUCIL SMOOTH TEXTURE    1 Bottle    Take 3 teaspoonful by mouth 3 times daily Mix in 8 ounces of water       RANITIDINE 75 PO      Take  by mouth. As needed for GI upset        sucralfate 1 GM/10ML suspension    CARAFATE    1200 mL    Take 10 mLs (1 g) by mouth 4 times daily       SUMAtriptan 100 MG tablet    IMITREX    18 tablet    Take 1 tablet (100 mg) by mouth at onset of headache for migraine May repeat in 2 hours. Max 2 tablets/24 hours.       triamcinolone 0.1 % cream    KENALOG    15 g    Apply sparingly to oral ulcers three times daily for 14 days as needed.       UNABLE TO FIND      daily MEDICATION NAME: Peppermint supplement       VITAMIN B6 PO          * Notice:  This list has 12 medication(s) that are the same as other medications prescribed for you. Read the directions carefully, and ask your doctor or other care provider to review them with you.

## 2017-04-10 NOTE — PROGRESS NOTES
Subjective:    HPI                    Objective:    System    Physical Exam    General     ROS    Assessment/Plan:      SUBJECTIVE  Subjective changes as noted by pt:   Pt. reports slow gradual progress with her L ankle with increasing ROM and improving ambulation. Pt. is having trouble figuring out her tri loc brace so has not been using it and going mainly with the boot.    Current pain level:  5/10   Changes in function:  Yes (See Goal flowsheet attached for changes in current functional level)     Adverse reaction to treatment or activity:  None    OBJECTIVE  Changes in objective findings:  AROM L DF 2; Inv 40; Ev 20. Strength L DF 4/5; PF 4-/5; Inv 4-/5; Ev 4-/5.     ASSESSMENT  Samara continues to require intervention to meet STG and LTG's: PT  Patient is progressing as expected.  Response to therapy has shown an improvement in  pain level and ROM   Progress made towards STG/LTG?  Yes (See Goal flowsheet attached for updates on achievement of STG and LTG)    PLAN  Current treatment program is being advanced to more complex exercises.    PTA/ATC plan:  N/A    Please refer to the daily flowsheet for treatment today, total treatment time and time spent performing 1:1 timed codes.

## 2017-04-14 ENCOUNTER — TELEPHONE (OUTPATIENT)
Dept: NURSING | Facility: CLINIC | Age: 22
End: 2017-04-14

## 2017-04-14 ENCOUNTER — HOSPITAL ENCOUNTER (EMERGENCY)
Facility: CLINIC | Age: 22
Discharge: HOME | End: 2017-04-14
Attending: EMERGENCY MEDICINE | Admitting: EMERGENCY MEDICINE
Payer: COMMERCIAL

## 2017-04-14 ENCOUNTER — TELEPHONE (OUTPATIENT)
Dept: FAMILY MEDICINE | Facility: CLINIC | Age: 22
End: 2017-04-14

## 2017-04-14 ENCOUNTER — APPOINTMENT (OUTPATIENT)
Dept: GENERAL RADIOLOGY | Facility: CLINIC | Age: 22
End: 2017-04-14
Attending: EMERGENCY MEDICINE
Payer: COMMERCIAL

## 2017-04-14 VITALS
HEART RATE: 126 BPM | RESPIRATION RATE: 14 BRPM | DIASTOLIC BLOOD PRESSURE: 85 MMHG | TEMPERATURE: 98.3 F | SYSTOLIC BLOOD PRESSURE: 124 MMHG | BODY MASS INDEX: 26.58 KG/M2 | WEIGHT: 150 LBS | HEIGHT: 63 IN | OXYGEN SATURATION: 100 %

## 2017-04-14 DIAGNOSIS — G89.29 CHRONIC ABDOMINAL PAIN: ICD-10-CM

## 2017-04-14 DIAGNOSIS — K92.0 HEMATEMESIS WITH NAUSEA: ICD-10-CM

## 2017-04-14 DIAGNOSIS — R10.9 CHRONIC ABDOMINAL PAIN: ICD-10-CM

## 2017-04-14 LAB
ALBUMIN SERPL-MCNC: 4 G/DL (ref 3.4–5)
ALP SERPL-CCNC: 109 U/L (ref 40–150)
ALT SERPL W P-5'-P-CCNC: 57 U/L (ref 0–50)
ANION GAP SERPL CALCULATED.3IONS-SCNC: 9 MMOL/L (ref 3–14)
AST SERPL W P-5'-P-CCNC: 31 U/L (ref 0–45)
BASOPHILS # BLD AUTO: 0 10E9/L (ref 0–0.2)
BASOPHILS NFR BLD AUTO: 0 %
BILIRUB SERPL-MCNC: 0.7 MG/DL (ref 0.2–1.3)
BUN SERPL-MCNC: 9 MG/DL (ref 7–30)
CALCIUM SERPL-MCNC: 8.6 MG/DL (ref 8.5–10.1)
CHLORIDE SERPL-SCNC: 106 MMOL/L (ref 94–109)
CO2 SERPL-SCNC: 23 MMOL/L (ref 20–32)
CREAT SERPL-MCNC: 0.78 MG/DL (ref 0.52–1.04)
CRP SERPL-MCNC: <2.9 MG/L (ref 0–8)
DIFFERENTIAL METHOD BLD: NORMAL
EOSINOPHIL # BLD AUTO: 0.1 10E9/L (ref 0–0.7)
EOSINOPHIL NFR BLD AUTO: 1.4 %
ERYTHROCYTE [DISTWIDTH] IN BLOOD BY AUTOMATED COUNT: 13.2 % (ref 10–15)
GFR SERPL CREATININE-BSD FRML MDRD: ABNORMAL ML/MIN/1.7M2
GLUCOSE SERPL-MCNC: 67 MG/DL (ref 70–99)
HCT VFR BLD AUTO: 38.7 % (ref 35–47)
HGB BLD-MCNC: 13 G/DL (ref 11.7–15.7)
IMM GRANULOCYTES # BLD: 0 10E9/L (ref 0–0.4)
IMM GRANULOCYTES NFR BLD: 0.2 %
LACTATE BLD-SCNC: 1.2 MMOL/L (ref 0.7–2.1)
LIPASE SERPL-CCNC: 93 U/L (ref 73–393)
LYMPHOCYTES # BLD AUTO: 2 10E9/L (ref 0.8–5.3)
LYMPHOCYTES NFR BLD AUTO: 39.8 %
MCH RBC QN AUTO: 29.7 PG (ref 26.5–33)
MCHC RBC AUTO-ENTMCNC: 33.6 G/DL (ref 31.5–36.5)
MCV RBC AUTO: 88 FL (ref 78–100)
MONOCYTES # BLD AUTO: 0.3 10E9/L (ref 0–1.3)
MONOCYTES NFR BLD AUTO: 5.3 %
NEUTROPHILS # BLD AUTO: 2.7 10E9/L (ref 1.6–8.3)
NEUTROPHILS NFR BLD AUTO: 53.3 %
NRBC # BLD AUTO: 0 10*3/UL
NRBC BLD AUTO-RTO: 0 /100
PLATELET # BLD AUTO: 223 10E9/L (ref 150–450)
POTASSIUM SERPL-SCNC: 3.8 MMOL/L (ref 3.4–5.3)
PROT SERPL-MCNC: 7.4 G/DL (ref 6.8–8.8)
RBC # BLD AUTO: 4.38 10E12/L (ref 3.8–5.2)
SODIUM SERPL-SCNC: 138 MMOL/L (ref 133–144)
WBC # BLD AUTO: 5.1 10E9/L (ref 4–11)

## 2017-04-14 PROCEDURE — 27210995 ZZH RX 272

## 2017-04-14 PROCEDURE — 83690 ASSAY OF LIPASE: CPT | Performed by: EMERGENCY MEDICINE

## 2017-04-14 PROCEDURE — 86140 C-REACTIVE PROTEIN: CPT | Performed by: EMERGENCY MEDICINE

## 2017-04-14 PROCEDURE — 40000556 ZZH STATISTIC PERIPHERAL IV START W US GUIDANCE

## 2017-04-14 PROCEDURE — 99284 EMERGENCY DEPT VISIT MOD MDM: CPT | Mod: Z6 | Performed by: EMERGENCY MEDICINE

## 2017-04-14 PROCEDURE — 83605 ASSAY OF LACTIC ACID: CPT | Performed by: EMERGENCY MEDICINE

## 2017-04-14 PROCEDURE — 96361 HYDRATE IV INFUSION ADD-ON: CPT | Mod: 59

## 2017-04-14 PROCEDURE — 85025 COMPLETE CBC W/AUTO DIFF WBC: CPT | Performed by: EMERGENCY MEDICINE

## 2017-04-14 PROCEDURE — 25000132 ZZH RX MED GY IP 250 OP 250 PS 637: Performed by: EMERGENCY MEDICINE

## 2017-04-14 PROCEDURE — 74020 XR ABDOMEN 2 VW: CPT

## 2017-04-14 PROCEDURE — 25000125 ZZHC RX 250: Performed by: EMERGENCY MEDICINE

## 2017-04-14 PROCEDURE — 40000141 ZZH STATISTIC PERIPHERAL IV START W/O US GUIDANCE

## 2017-04-14 PROCEDURE — 99284 EMERGENCY DEPT VISIT MOD MDM: CPT | Mod: 25

## 2017-04-14 PROCEDURE — 96374 THER/PROPH/DIAG INJ IV PUSH: CPT

## 2017-04-14 PROCEDURE — 80053 COMPREHEN METABOLIC PANEL: CPT | Performed by: EMERGENCY MEDICINE

## 2017-04-14 PROCEDURE — 25000128 H RX IP 250 OP 636: Performed by: EMERGENCY MEDICINE

## 2017-04-14 RX ORDER — ONDANSETRON 2 MG/ML
4 INJECTION INTRAMUSCULAR; INTRAVENOUS ONCE
Status: COMPLETED | OUTPATIENT
Start: 2017-04-14 | End: 2017-04-14

## 2017-04-14 RX ORDER — ONDANSETRON 4 MG/1
4 TABLET, ORALLY DISINTEGRATING ORAL EVERY 6 HOURS PRN
Qty: 20 TABLET | Refills: 0 | Status: SHIPPED | OUTPATIENT
Start: 2017-04-14 | End: 2017-05-17

## 2017-04-14 RX ORDER — ONDANSETRON 4 MG/1
4 TABLET, ORALLY DISINTEGRATING ORAL ONCE
Status: COMPLETED | OUTPATIENT
Start: 2017-04-14 | End: 2017-04-14

## 2017-04-14 RX ADMIN — SODIUM CHLORIDE 1000 ML: 9 INJECTION, SOLUTION INTRAVENOUS at 12:05

## 2017-04-14 RX ADMIN — LIDOCAINE HYDROCHLORIDE 30 ML: 20 SOLUTION ORAL; TOPICAL at 11:47

## 2017-04-14 RX ADMIN — ONDANSETRON 4 MG: 4 TABLET, ORALLY DISINTEGRATING ORAL at 11:47

## 2017-04-14 RX ADMIN — ONDANSETRON 4 MG: 2 INJECTION INTRAMUSCULAR; INTRAVENOUS at 12:52

## 2017-04-14 ASSESSMENT — ENCOUNTER SYMPTOMS
COLOR CHANGE: 0
EYE REDNESS: 0
ARTHRALGIAS: 0
ROS GI COMMENTS: VOMITING BLOOD
VOMITING: 1
NECK STIFFNESS: 0
CONFUSION: 0
NAUSEA: 1
FEVER: 0
HEADACHES: 0
SHORTNESS OF BREATH: 0
ABDOMINAL PAIN: 1
DIFFICULTY URINATING: 0

## 2017-04-14 NOTE — TELEPHONE ENCOUNTER
"Clinic Action Needed:  Yes, callback within 2 hours  [facility] Triage Call  Presenting Problem:    Williams \"vomited bright red  blood last night.\"  Samara states that she vomited blood for 5 minutes and it was a \"fair\" amount more than scant.  [facility] Triage/Gastrointestinal Bleeding/disposition is to call 911 and Samara is requesting to speak with MD Sonja cifuentes.  Please phone Samara within 2 hours at [phone].    Guideline Used: GI Bleeding  Patient Recommendations/Teaching: call 911  Routed to: JESSY Cisneros RN/FNA        "

## 2017-04-14 NOTE — ED NOTES
"Triage Assessment & Note:    /82  Pulse 126  Temp 98.3  F (36.8  C) (Oral)  Resp 16  Ht 1.6 m (5' 3\")  Wt 68 kg (150 lb)  LMP 04/07/2017  SpO2 100%  BMI 26.57 kg/m2    Patient presents with: c/o abdominal pain and vomiting blood, bright red. PT reports that s/s started last night around 2000. PT behcets syndrome.     Home Treatments/Remedies: Ativan    Febrile / Afebrile? Febrile yesterday    Duration of C/o:  12hrs     Jeffrey Howell  April 14, 2017      "

## 2017-04-14 NOTE — DISCHARGE INSTRUCTIONS
Upper GI Bleeding (Stable)  Your upper gastrointestinal (GI) tract includes your esophagus, stomach, and upper small intestine. You have signs of bleeding from your upper GI tract. You may have vomited or coughed up blood or coffee-ground like material. Or you may have black or tarry stools. Very small amounts of GI bleeding may not be visible and can only be found by a test of the stool.  Causes of upper GI bleeding can include:    Tear in the lining of the esophagus    Enlarged veins in the esophagus    An ulcer in the stomach or top of the small intestine    Severe irritation of the stomach    Inflammation of the digestive tract  Note: A bloody nose or mouth or dental problems may cause blood to be swallowed and vomited up again. This is not true GI bleeding. Iron supplements and medicines for diarrhea and upset stomach can cause black stools. This is not GI bleeding and is not a cause for concern.  Home care  Depending on the cause of your bleeding, care may include the following:    You may be given medicines to help protect your GI tract, treat your problem, and promote healing. Take these as directed.    Avoid NSAIDs, such as aspirin, ibuprofen, or naproxen. They can irritate the stomach and cause further bleeding. If you are taking these medicines for other medical reasons, talk to your healthcare provider before you stop them.      Avoid alcohol, caffeine, and tobacco, which can delay healing and worsen your problem.  Follow-up care  Follow up with your healthcare provider as advised. Further tests may need to be done to find the cause of your bleeding.  When to seek medical advice  Call your healthcare provider for any of the following:    Stomach pain appears or gets worse    Pain spreads to the neck, back, shoulder, or arm    Weakness or dizziness    Swelling of your abdomen    Red blood in your stool    Fever of 100.4F (38C) or higher, or as directed by your healthcare provider  Call 911  Get  emergency medical care if any of these occur:    Trouble breathing or swallowing    Severe dizziness    Loss of consciousness    Vomiting blood or large amounts of blood in the stool    3851-3655 [employer] [address] [place] [address], [place] [address]. All rights reserved. This information is not intended as a substitute for professional medical care. Always follow your healthcare professional's instructions.

## 2017-04-14 NOTE — ED AVS SNAPSHOT
[facility] [place], Emergency Department    [address] [place] [address]    Phone:  [phone]                                       Samara Oropeza   MRN: [id number]    Department:  [facility], Emergency Department   Date of Visit:  4/14/2017           After Visit Summary Signature Page     I have received my discharge instructions, and my questions have been answered. I have discussed any challenges I see with this plan with the nurse or doctor.    ..........................................................................................................................................  Patient/Patient Representative Signature      ..........................................................................................................................................  Patient Representative Print Name and Relationship to Patient    ..................................................               ................................................  Date                                            Time    ..........................................................................................................................................  Reviewed by Signature/Title    ...................................................              ..............................................  Date                                                            Time

## 2017-04-14 NOTE — ED AVS SNAPSHOT
[facility], Emergency Department    [address] [facility] [address]    Phone:  [phone]                                       Samara Oropeza   MRN: [id number]    Department:  [facility], Emergency Department   Date of Visit:  4/14/2017           Patient Information     Date Of Birth          1995        Your diagnoses for this visit were:     Hematemesis with nausea     Chronic abdominal pain        You were seen by Hussein Meehan MD.      Follow-up Information     Follow up with Sonja Abreu APRN CNP.    Specialty:  Nurse Practitioner    Contact information:    [facility]  [address] [facility] [address]  [facility] [address] [phone]          Follow up with Austen Marquez MD.    Specialty:  Rheumatology    Why:  As scheduled    Contact information:    [facility]  [address] [facility] [address]  [facility] [address] [phone]          Discharge Instructions         Upper GI Bleeding (Stable)  Your upper gastrointestinal (GI) tract includes your esophagus, stomach, and upper small intestine. You have signs of bleeding from your upper GI tract. You may have vomited or coughed up blood or coffee-ground like material. Or you may have black or tarry stools. Very small amounts of GI bleeding may not be visible and can only be found by a test of the stool.  Causes of upper GI bleeding can include:    Tear in the lining of the esophagus    Enlarged veins in the esophagus    An ulcer in the stomach or top of the small intestine    Severe irritation of the stomach    Inflammation of the digestive tract  Note: A bloody nose or mouth or dental problems may cause blood to be swallowed and vomited up again. This is not true GI bleeding. Iron supplements and medicines for diarrhea and upset stomach can cause black stools. This is not GI bleeding and is not a cause for concern.  Home care  Depending on the cause of your bleeding, care may include the following:    You may be  given medicines to help protect your GI tract, treat your problem, and promote healing. Take these as directed.    Avoid NSAIDs, such as aspirin, ibuprofen, or naproxen. They can irritate the stomach and cause further bleeding. If you are taking these medicines for other medical reasons, talk to your healthcare provider before you stop them.      Avoid alcohol, caffeine, and tobacco, which can delay healing and worsen your problem.  Follow-up care  Follow up with your healthcare provider as advised. Further tests may need to be done to find the cause of your bleeding.  When to seek medical advice  Call your healthcare provider for any of the following:    Stomach pain appears or gets worse    Pain spreads to the neck, back, shoulder, or arm    Weakness or dizziness    Swelling of your abdomen    Red blood in your stool    Fever of 100.4F (38C) or higher, or as directed by your healthcare provider  Call 911  Get emergency medical care if any of these occur:    Trouble breathing or swallowing    Severe dizziness    Loss of consciousness    Vomiting blood or large amounts of blood in the stool    1584-7024 [employer]. [address] [place] [address]. All rights reserved. This information is not intended as a substitute for professional medical care. Always follow your healthcare professional's instructions.          Future Appointments        Provider Department Dept Phone Center    4/18/2017 10:10 AM Brittny James PT [facility] [phone] LUIZ ALLEN    4/19/2017 8:30 AM ALISA Burt [facility] [phone] [facility] [place]    4/19/2017 2:30 PM Angela Nova [facility] [phone] [facility]    4/21/2017 11:00 AM EVI Vizcaino [facility] G[facility] and [phone] [facility]    4/24/2017 11:30 AM ALISA Burt  [facility] [phone] [facility]    4/25/2017 11:00 AM Andres Johnson DPM, [facility] [phone] [facility]    6/22/2017 1:50 PM Frederick Bender MD [facility] [phone] [facility]      24 Hour Appointment Hotline       To make an appointment at any [facility], call [phone] ([phone]). If you don't have a family doctor or clinic, we will help you find one. [facility] clinics are conveniently located to serve the needs of you and your family.             Review of your medicines      CONTINUE these medicines which may have CHANGED, or have new prescriptions. If we are uncertain of the size of tablets/capsules you have at home, strength may be listed as something that might have changed.        Dose / Directions Last dose taken    ondansetron 4 MG ODT tab   Commonly known as:  ZOFRAN-ODT   Dose:  4 mg   What changed:  when to take this   Quantity:  20 tablet        Take 1 tablet (4 mg) by mouth every 6 hours as needed for nausea   Refills:  0          Our records show that you are taking the medicines listed below. If these are incorrect, please call your family doctor or clinic.        Dose / Directions Last dose taken    albuterol 108 (90 BASE) MCG/ACT Inhaler   Commonly known as:  PROAIR HFA/PROVENTIL HFA/VENTOLIN HFA   Dose:  2 puff   Quantity:  1 Inhaler        Inhale 2 puffs into the lungs every 6 hours as needed for shortness of breath / dyspnea or wheezing   Refills:  1        amitriptyline 25 MG tablet   Commonly known as:  ELAVIL   Dose:  25 mg   Quantity:  45 tablet        Take 1 tablet (25 mg) by mouth At Bedtime May increase to 2, if no benefit after 2 weeks.  Feel free to call / page the nurse for Dr. Mcmahon at [phone] / [phone] with questions regarding this order.   Refills:  3        * Apremilast 10 & 20 & 30 MG Tbpk   Commonly known as:  OTEZLA   Quantity:  27 tablet         Take one tablet in the morning on day 1, then take one tablet every 12 hours on days 2 thru 14, according to the instructions on the packet.   Refills:  0        * apremilast 30 MG tablet   Commonly known as:  OTEZLA   Quantity:  60 tablet        20 mg in AM and 30mg in PM daily X 2 weeks and then 30mg twice daily.   Refills:  3        betamethasone valerate 0.1 % cream   Commonly known as:  VALISONE        Apply topically 2 times daily   Refills:  0        * botulinum toxin type A 100 UNITS injection   Commonly known as:  BOTOX   Dose:  200 Units   Quantity:  200 Units        Inject 200 Units into the muscle every 3 months   Refills:  3        * BOTOX IJ   Dose:  150 Units        Inject 150 Units into the muscle once Lot: /C3 Exp: 05/2019   Refills:  0        * BOTOX IJ   Dose:  150 Units        Inject 150 Units into the muscle Lot # /[id number]  Exp: 8/2019   Refills:  0        * BOTOX IJ   Dose:  162.5 Units        Inject 162.5 Units as directed once Lot #: /[id number] Exp: 10/2019   Refills:  0        carbamide peroxide 6.5 % otic solution   Commonly known as:  DEBROX   Dose:  5 drop        5 drops 2 times daily   Refills:  0        clobetasol 0.05 % cream   Commonly known as:  TEMOVATE   Quantity:  60 g        Apply topically 2 times daily   Refills:  0        Colchicine 0.6 MG Caps   Dose:  1 capsule   Quantity:  90 capsule        Take 0.6 mg by mouth See Admin Instructions 2 capsules in AM and 1 capsule in PM   Refills:  3        dexamethasone 4 MG/ML injection   Commonly known as:  DECADRON   Dose:  4 mg   Quantity:  30 mL        Apply 1 mL (4 mg) topically as needed   Refills:  0        * dicyclomine 20 MG tablet   Commonly known as:  BENTYL   Dose:  20 mg   Quantity:  90 tablet        Take 1 tablet (20 mg) by mouth every 6 hours   Refills:  1        * dicyclomine 10 MG capsule   Commonly known as:  BENTYL   Dose:  10-20 mg   Quantity:  120 capsule        Take 1-2 capsules (10-20 mg) by mouth 2 times  daily as needed   Refills:  3        EPINEPHrine 0.3 MG/0.3ML injection   Commonly known as:  EPIPEN   Dose:  0.3 mg   Quantity:  2 each        Inject 0.3 mLs into the muscle once as needed for anaphylaxis for 1 dose. And call 911.   Refills:  1        EXCEDRIN MIGRAINE PO        Take by mouth as needed   Refills:  0        guanFACINE 1 MG tablet   Commonly known as:  TENEX   Quantity:  180 tablet        3 tablets in the morning and 3 tablets in the evening   Refills:  3        hyoscyamine 0.125 MG tablet   Commonly known as:  ANASPAZ/LEVSIN   Dose:  0.125-0.25 mg   Quantity:  40 tablet        Take 1-2 tablets (125-250 mcg) by mouth every 4 hours as needed for cramping   Refills:  1        hypromellose 0.3 % Soln ophthalmic solution   Commonly known as:  GENTEAL   Dose:  1 drop        1 drop every hour as needed   Refills:  0        LACTAID PO        Take by mouth daily   Refills:  0        lidocaine 2 % topical gel   Commonly known as:  XYLOCAINE   Dose:  1 Tube        Apply 1 Tube topically daily   Refills:  0        lidocaine 5 % Patch   Commonly known as:  LIDODERM   Quantity:  30 patch        Apply up to 3 patches to painful area at once for up to 12 h within a 24 h period.  Remove after 12 hours.   Refills:  1        lidocaine visc 2% 2.5mL/5mL & maalox/mylanta w/ simeth 2.5mL/5mL & diphenhydrAMINE 5mg/5mL Susp suspension   Commonly known as:  MAGIC Mouthwash   Dose:  10 mL   Quantity:  1 Bottle        Swish and swallow 10 mLs in mouth every 6 hours as needed for mouth sores   Refills:  1        linaclotide 290 MCG capsule   Commonly known as:  LINZESS   Dose:  290 mcg   Quantity:  30 capsule        Take 1 capsule (290 mcg) by mouth every morning (before breakfast)   Refills:  1        * LORazepam 1 MG tablet   Commonly known as:  ATIVAN   Dose:  1-2 mg   Quantity:  90 tablet        Take 1-2 tablets (1-2 mg) by mouth every 8 hours as needed for other (abdominal pain) Do not operate a vehicle after taking this  medication   Refills:  0        * LORazepam 1 MG tablet   Commonly known as:  ATIVAN   Dose:  1 mg   Quantity:  90 tablet        Take 1 tablet (1 mg) by mouth every 8 hours as needed for anxiety   Refills:  0        lubiprostone 24 MCG capsule   Commonly known as:  AMITIZA   Dose:  24 mcg   Quantity:  60 capsule        Take 1 capsule (24 mcg) by mouth 2 times daily (with meals)   Refills:  3        meclizine 25 MG tablet   Commonly known as:  ANTIVERT   Dose:  25 mg   Quantity:  30 tablet        Take 1 tablet (25 mg) by mouth every 6 hours as needed for dizziness   Refills:  1        metoclopramide 5 MG tablet   Commonly known as:  REGLAN   Dose:  5 mg   Quantity:  60 tablet        Take 1 tablet (5 mg) by mouth 2 times daily as needed   Refills:  2        Multi-vitamin Tabs tablet   Dose:  1 tablet        Take 1 tablet by mouth daily Reported on 3/21/2017   Refills:  0        norgestimate-ethinyl estradiol 0.25-35 MG-MCG per tablet   Commonly known as:  ORTHO-CYCLEN, SPRINTEC   Dose:  1 tablet   Quantity:  84 tablet        Take 1 tablet by mouth daily   Refills:  3        omeprazole 40 MG capsule   Commonly known as:  priLOSEC   Dose:  40 mg   Quantity:  30 capsule        Take 1 capsule (40 mg) by mouth daily   Refills:  9        * order for DME   Quantity:  1 Units        Equipment being ordered: left boot for ankle and foot immobilization   Refills:  0        * order for DME   Quantity:  1 Device        Equipment being ordered: trilock ankle brace   Refills:  0        polyethylene glycol powder   Commonly known as:  MIRALAX/GLYCOLAX   Dose:  17 g        Take 17 g by mouth 2 times daily   Refills:  0        promethazine 25 MG tablet   Commonly known as:  PHENERGAN   Dose:  12.5-25 mg   Quantity:  20 tablet        Take 0.5-1 tablets (12.5-25 mg) by mouth every 6 hours as needed for nausea   Refills:  1        psyllium 63 % Powd   Commonly known as:  METAMUCIL SMOOTH TEXTURE   Dose:  3 teaspoonful   Quantity:  1  Bottle        Take 3 teaspoonful by mouth 3 times daily Mix in 8 ounces of water   Refills:  11        RANITIDINE 75 PO        Take  by mouth. As needed for GI upset   Refills:  0        sucralfate 1 GM/10ML suspension   Commonly known as:  CARAFATE   Dose:  1 g   Quantity:  1200 mL        Take 10 mLs (1 g) by mouth 4 times daily   Refills:  2        SUMAtriptan 100 MG tablet   Commonly known as:  IMITREX   Dose:  100 mg   Quantity:  18 tablet        Take 1 tablet (100 mg) by mouth at onset of headache for migraine May repeat in 2 hours. Max 2 tablets/24 hours.   Refills:  3        triamcinolone 0.1 % cream   Commonly known as:  KENALOG   Quantity:  15 g        Apply sparingly to oral ulcers three times daily for 14 days as needed.   Refills:  1        UNABLE TO FIND        daily MEDICATION NAME: Peppermint supplement   Refills:  0        VITAMIN B6 PO        Refills:  0        * Notice:  This list has 12 medication(s) that are the same as other medications prescribed for you. Read the directions carefully, and ask your doctor or other care provider to review them with you.            Prescriptions were sent or printed at these locations (1 Prescription)                   Other Prescriptions                Printed at Department/Unit printer (1 of 1)         ondansetron (ZOFRAN-ODT) 4 MG ODT tab                Procedures and tests performed during your visit     Abdomen XR, 2 vw, flat and upright    CBC with platelets differential    CRP inflammation    Comprehensive metabolic panel    Lactic acid    Lipase    Orthostatic blood pressure and pulse    [facility] Adult IP Consult      Orders Needing Specimen Collection     None      Pending Results     No orders found from 4/12/2017 to 4/15/2017.            Pending Culture Results     No orders found from 4/12/2017 to 4/15/2017.            Thank you for choosing [facility]       Thank you for choosing [facility] for your care. Our goal is always to provide you  with excellent care. Hearing back from our patients is one way we can continue to improve our services. Please take a few minutes to complete the written survey that you may receive in the mail after you visit with us. Thank you!        [employer] Information     [employer] gives you secure access to your electronic health record. If you see a primary care provider, you can also send messages to your care team and make appointments. If you have questions, please call your primary care clinic.  If you do not have a primary care provider, please call [phone] and they will assist you.        Care EveryWhere ID     This is your Care EveryWhere ID. This could be used by other organizations to access your [facility] medical records  [id number]        After Visit Summary       This is your record. Keep this with you and show to your community pharmacist(s) and doctor(s) at your next visit.

## 2017-04-14 NOTE — TELEPHONE ENCOUNTER
"Call Type: Triage Call    Presenting Problem: Williams \"vomited bright red  blood last  night.\"  Samara states that she vomited blood for 5 minutes and  it was a \"fair\" amount more than scant.  [facility]  Triage/Gastrointestinal Bleeding/disposition is to call 911 and  Samara is requesting to speak with MD Sonja cifuentes.  Please  phone Samara within 2 hours at [phone].  Triage Note:  Guideline Title: Gastrointestinal Bleeding ; Nausea or Vomiting  Recommended Disposition: Activate   Original Inclination: Wanted to speak with a nurse  Override Disposition:  Intended Action: Call 911  Physician Contacted: No  Vomiting red, bloody or coffee-ground material, more than streaks of blood or  scant amount (not following nosebleed within past day) ?  YES  New or worsening signs and symptoms that may indicate shock ? NO  Passing red, black or tarry material from rectum AND onset of new signs and  symptoms of hypovolemia ? NO  Unbearable abdominal/pelvic pain ? NO  Chest discomfort associated with shortness of breath, sweating, odd heartbeats or  different heart rate, nausea, vomiting, lightheadedness, or fainting lasting 5 or  more minutes now or within the last hour ? NO  Chest pain spreading to the shoulders, neck, jaw, in one or both arms, stomach or  back lasting 5 or more minutes now or within the last hour. Pain is NOT  associated with taking a deep breath or a productive cough, movement, or touch to  a localized area. ? NO  Pressure, fullness, squeezing sensation or pain anywhere in the chest lasting 5 or  more minutes now or within the last hour. Pain is NOT associated with taking a  deep breath or a productive cough, movement, or touch to a localized area on the  chest. ? NO  Possible or known pregnancy ? NO  New or worsening signs and symptoms that may indicate shock ? NO  Following ingestion of toxic or caustic substance ? NO  Vomiting red, bloody or coffee-ground material, more than " streaks of blood or  scant amount (not following nosebleed within past day) ? NO  Sudden onset vomiting following ingestion or inhalation overdose (accidental or  intentional) of illegal, prescription, nonprescription or alternative drugs ? NO  Any other cardiac signs/symptoms for more than 5 minutes, now or within last hour.  Pain is NOT associated with taking a deep breath or a productive cough, movement,  or touch to a localized area on the chest or upper body. ? NO  Vomiting associated with sudden onset of focal neurological changes (difficulty  speaking, numbness, weakness, paralysis, loss of coordination, change in vision  such as double vision or loss of visual field) ? NO  High to low (but not zero) risk of exposure to Ebola within the past 21 days ? NO  Physician Instructions:  Care Advice: IMMEDIATE ACTION  Write down provider's name. List or place the following in a bag for  transport with the patient: current prescription and/or nonprescription  medications  alternative treatments, therapies and medications  and street drugs.  An adult should stay with the patient, preferably one trained in CPR. If  the person is not trained in CPR, then he or she should provide hands-only  (compression-only) CPR as recommended by the American [employer] Association.

## 2017-04-14 NOTE — TELEPHONE ENCOUNTER
Huddled with Brady - advised to ED now/911    Return call to patient discusses provider response - she states she can get a ride at 2 pm - writer advised recommendation is to be evaluated in ED at this time or call 911    Patient verbalized understanding - no further questions at this time    Closing encounter - no further actions needed at this time    Wes Locke RN

## 2017-04-14 NOTE — ED PROVIDER NOTES
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 Marquez who presents to the ED today with nausea, vomiting and abdominal pain. Patient states her symptoms began yesterday at 10:30 PM with severe sharp diffuse upper abdominal pain into her back. About an hour later she she states she started vomiting for about 20 minutes. She noticed some blood in emesis towards the last 5 minutes of the episode. Patient also notes 2 days of diarrhea, with 12 episodes yesterday but none today. She notes her stools were light brown and mucus like. Here in the ED, she reports 5/10 abdominal pain as well as mild nausea.She did take Ativan today which did improve her abdominal pain.     Patient reports a history of chronic abdominal pain and notes this pain is different in location and quality to her usual abdominal pain which is under her navel. Nothing makes the pain better or worse. She states her Otezla dose was recently increased. Patient's mother states the patient has had frequent syncopal episodes since September 2016 and recently injured her ankle due to a fall.             I have reviewed the Medications, Allergies, Past Medical and Surgical History, and Social History in the Epic system.      PAST MEDICAL HISTORY:   Past Medical History:   Diagnosis Date     Anxiety      Behcet's disease (H)      Cervical adenitis May 2010     Chronic abdominal pain      Constipation, chronic 1994     Gastro-oesophageal reflux disease      Gastroparesis      Migraines      Palpitations      Syncope      Tourette's        PAST SURGICAL HISTORY:   Past Surgical History:   Procedure Laterality Date     ARTHROSCOPY KNEE WITH PATELLAR REALIGNMENT  7/25/2013    Procedure: ARTHROSCOPY KNEE WITH PATELLAR REALIGNMENT;  Left Knee Arthroscopy, Medial Patellofemoral Ligament Reconstruction with Allograft  ;   Surgeon: Jennifer Acevedo MD;  [facility]: [facility]     DENTAL SURGERY  1996    Teeth removal     ENDOSCOPY UPPER, COLONOSCOPY, COMBINED  2005     HC ESOPH/GAS REFLUX TEST W NASAL IMPED >1 HR N/A 2/15/2017    Procedure: ESOPHAGEAL IMPEDENCE FUNCTION TEST WITH 24 HOUR PH GREATER THAN 1 HOUR;  Surgeon: Timothy Matta MD;  Location:  GI       FAMILY HISTORY:   Family History   Problem Relation Age of Onset     Depression Mother      Neurologic Disorder Mother      Migraines, take imitrex injection.  Also in maternal grandmother.       Alcohol/Drug Father      Hypertension Father      Depression Father      Cardiovascular Maternal Grandmother      Depression Maternal Grandmother      Hypertension Maternal Grandmother      Alzheimer Disease Maternal Grandmother      Cardiovascular Maternal Grandfather      Hypertension Maternal Grandfather      Depression Maternal Grandfather      Alcohol/Drug Maternal Grandfather      Cardiovascular Paternal Grandmother      Hypertension Paternal Grandmother      Cardiovascular Paternal Grandfather      Hypertension Paternal Grandfather      Glaucoma No family hx of      Macular Degeneration No family hx of        SOCIAL HISTORY:   Social History   Substance Use Topics     Smoking status: Never Smoker     Smokeless tobacco: Never Used     Alcohol use Yes      Comment: seldom     No current facility-administered medications for this encounter.      Current Outpatient Prescriptions   Medication     ondansetron (ZOFRAN-ODT) 4 MG ODT tab     apremilast (OTEZLA) 30 MG tablet     Colchicine 0.6 MG CAPS     dexamethasone (DECADRON) 4 MG/ML injection     order for DME     OnabotulinumtoxinA (BOTOX IJ)     order for DME     LORazepam (ATIVAN) 1 MG tablet     guanFACINE (TENEX) 1 MG tablet     norgestimate-ethinyl estradiol (ORTHO-CYCLEN, SPRINTEC) 0.25-35 MG-MCG per tablet     LORazepam (ATIVAN) 1 MG tablet     lubiprostone (AMITIZA) 24 MCG capsule     albuterol (PROAIR HFA/PROVENTIL  HFA/VENTOLIN HFA) 108 (90 BASE) MCG/ACT Inhaler     dicyclomine (BENTYL) 10 MG capsule     amitriptyline (ELAVIL) 25 MG tablet     OnabotulinumtoxinA (BOTOX IJ)     SUMAtriptan (IMITREX) 100 MG tablet     promethazine (PHENERGAN) 25 MG tablet     sucralfate (CARAFATE) 1 GM/10ML suspension     metoclopramide (REGLAN) 5 MG tablet     meclizine (ANTIVERT) 25 MG tablet     Apremilast (OTEZLA) 10 & 20 & 30 MG TBPK     Magic Mouthwash (FV std formula) lidocaine visc 2% 2.5mL/5mL & maalox/mylanta w/ simeth 2.5mL/5mL & diphenhydrAMINE 5mg/5mL     clobetasol (TEMOVATE) 0.05 % cream     OnabotulinumtoxinA (BOTOX IJ)     botulinum toxin type A (BOTOX) 100 UNITS injection     lidocaine (LIDODERM) 5 % patch     omeprazole (PRILOSEC) 40 MG capsule     linaclotide (LINZESS) 290 MCG capsule     UNABLE TO FIND     hyoscyamine (ANASPAZ,LEVSIN) 0.125 MG tablet     Aspirin-Acetaminophen-Caffeine (EXCEDRIN MIGRAINE PO)     hypromellose (GENTEAL) 0.3 % SOLN     betamethasone valerate (VALISONE) 0.1 % cream     carbamide peroxide (DEBROX) 6.5 % otic solution     psyllium (METAMUCIL SMOOTH TEXTURE) 63 % POWD     polyethylene glycol (MIRALAX/GLYCOLAX) powder     Lactase (LACTAID PO)     multivitamin, therapeutic with minerals (MULTI-VITAMIN) TABS     Pyridoxine HCl (VITAMIN B6 PO)     dicyclomine (BENTYL) 20 MG tablet     lidocaine (XYLOCAINE) 2 % jelly     triamcinolone (KENALOG) 0.1 % cream     EPINEPHrine (EPIPEN) 0.3 MG/0.3ML injection     Ranitidine HCl (RANITIDINE 75 PO)        Allergies   Allergen Reactions     Amoxil [Penicillins] Rash     Dad unsure of reaction.     Contrast Dye Rash     Contrast Media Ready-[facility], 04/09/2014.; Contrast Media Ready-[facility], 04/09/2014.  NOTE: this is a contrast media oral with iodine. Premedicate with methylpred standard for IV contrast, request barium contrast for oral contrast.     Kiwi Swelling     Orange Fruit [Citrus] Anaphylaxis     Pineapple Anaphylaxis, Difficulty breathing and Rash      "Milk Protein Extract Hives     Reglan [Metoclopramide] Other (See Comments)     IV dose only, in ER, rapid heart rate.     Ace Inhibitors      Difficulty in breathing and GI upset     Amitiza [Lubiprostone] Nausea and Vomiting     Amoxicillin-Pot Clavulanate      Latex      Midazolam Unknown     parent states that when pt takes this medication, she wakes up being very violent .     Versed      Coming out of pelvic exam at age of 5, was kicking and screaming when coming out of the versed.     Adhesive Tape Rash     Azithromycin Hives and Rash     Cephalexin Itching and Rash     Itchy mouth     Keflex [Cephalexin-Fd&C Yellow #6] Hives       Review of Systems   Constitutional: Negative for fever.   HENT: Negative for congestion.    Eyes: Negative for redness.   Respiratory: Negative for shortness of breath.    Cardiovascular: Negative for chest pain.   Gastrointestinal: Positive for abdominal pain, nausea and vomiting.        Vomiting blood   Genitourinary: Negative for difficulty urinating.   Musculoskeletal: Negative for arthralgias and neck stiffness.   Skin: Negative for color change.   Neurological: Negative for headaches.   Psychiatric/Behavioral: Negative for confusion.       Physical Exam   BP: 122/82  Pulse: 126  Temp: 98.3  F (36.8  C)  Resp: 16  Height: 160 cm (5' 3\")  Weight: 68 kg (150 lb)  SpO2: 100 %  Physical Exam   Constitutional: No distress.   HENT:   Head: Atraumatic.   Mouth/Throat: Oropharynx is clear and moist. No oropharyngeal exudate.   Eyes: Pupils are equal, round, and reactive to light. No scleral icterus.   Cardiovascular: Normal heart sounds and intact distal pulses.    Pulmonary/Chest: Breath sounds normal. No respiratory distress.   Abdominal: Soft. Bowel sounds are normal. There is tenderness (diffuse).   Musculoskeletal: She exhibits no edema or tenderness.   Skin: Skin is warm. No rash noted. She is not diaphoretic.       ED Course     ED Course     Procedures       10:43 AM  The " patient was seen and examined by Dr. Meehan in Room 20.     Results for orders placed or performed during the hospital encounter of 04/14/17   Abdomen XR, 2 vw, flat and upright    Narrative    XR ABDOMEN 2 VW  4/14/2017 11:24 AM    History:  Rule out perforation, abdominal pain.     Comparison: CT abdomen dated 3/9/2015, abdominal radiograph dated  7/10/2015    Findings:   Nonobstructive bowel gas pattern. No free air. Visualized lung field  is clear. No acute osseous abnormalities.      Impression    IMPRESSION:  Nonobstructive bowel gas pattern. No evidence of pneumoperitoneum.    I have personally reviewed the examination and initial interpretation  and I agree with the findings.    KRANTHI FRANCO MD   CBC with platelets differential   Result Value Ref Range    WBC 5.1 4.0 - 11.0 10e9/L    RBC Count 4.38 3.8 - 5.2 10e12/L    Hemoglobin 13.0 11.7 - 15.7 g/dL    Hematocrit 38.7 35.0 - 47.0 %    MCV 88 78 - 100 fl    MCH 29.7 26.5 - 33.0 pg    MCHC 33.6 31.5 - 36.5 g/dL    RDW 13.2 10.0 - 15.0 %    Platelet Count 223 150 - 450 10e9/L    Diff Method Automated Method     % Neutrophils 53.3 %    % Lymphocytes 39.8 %    % Monocytes 5.3 %    % Eosinophils 1.4 %    % Basophils 0.0 %    % Immature Granulocytes 0.2 %    Nucleated RBCs 0 0 /100    Absolute Neutrophil 2.7 1.6 - 8.3 10e9/L    Absolute Lymphocytes 2.0 0.8 - 5.3 10e9/L    Absolute Monocytes 0.3 0.0 - 1.3 10e9/L    Absolute Eosinophils 0.1 0.0 - 0.7 10e9/L    Absolute Basophils 0.0 0.0 - 0.2 10e9/L    Abs Immature Granulocytes 0.0 0 - 0.4 10e9/L    Absolute Nucleated RBC 0.0    Comprehensive metabolic panel   Result Value Ref Range    Sodium 138 133 - 144 mmol/L    Potassium 3.8 3.4 - 5.3 mmol/L    Chloride 106 94 - 109 mmol/L    Carbon Dioxide 23 20 - 32 mmol/L    Anion Gap 9 3 - 14 mmol/L    Glucose 67 (L) 70 - 99 mg/dL    Urea Nitrogen 9 7 - 30 mg/dL    Creatinine 0.78 0.52 - 1.04 mg/dL    GFR Estimate >90  Non  GFR Calc   >60 mL/min/1.7m2     GFR Estimate If Black >90   GFR Calc   >60 mL/min/1.7m2    Calcium 8.6 8.5 - 10.1 mg/dL    Bilirubin Total 0.7 0.2 - 1.3 mg/dL    Albumin 4.0 3.4 - 5.0 g/dL    Protein Total 7.4 6.8 - 8.8 g/dL    Alkaline Phosphatase 109 40 - 150 U/L    ALT 57 (H) 0 - 50 U/L    AST 31 0 - 45 U/L   Lactic acid   Result Value Ref Range    Lactic Acid 1.2 0.7 - 2.1 mmol/L   Lipase   Result Value Ref Range    Lipase 93 73 - 393 U/L   CRP inflammation   Result Value Ref Range    CRP Inflammation <2.9 0.0 - 8.0 mg/L     Medications   0.9% sodium chloride BOLUS (0 mLs Intravenous Stopped 4/14/17 1430)   ondansetron (ZOFRAN) injection 4 mg (4 mg Intravenous Given 4/14/17 1252)   lidocaine (XYLOCAINE) 2 % 15 mL, alum & mag hydroxide-simethicone (MYLANTA ES/MAALOX  ES) 15 mL GI Cocktail (30 mLs Oral Given 4/14/17 1147)   ondansetron (ZOFRAN-ODT) ODT tab 4 mg (4 mg Oral Given 4/14/17 1147)            Labs Ordered and Resulted from Time of ED Arrival Up to the Time of Departure from the ED   COMPREHENSIVE METABOLIC PANEL - Abnormal; Notable for the following:        Result Value    Glucose 67 (*)     ALT 57 (*)     All other components within normal limits   CBC WITH PLATELETS DIFFERENTIAL   LACTIC ACID WHOLE BLOOD   LIPASE   CRP INFLAMMATION   ORTHOSTATIC BLOOD PRESSURE AND PULSE       Assessments & Plan (with Medical Decision Making)   21-year-old female with a history of Behcet's presents with complaints of 20 minute episode of vomiting which occurred last night.  The end of vomiting was associated with throwing up bright red blood.  She has had no further vomiting but has ongoing nausea and has not had a bowel movement.  Laboratories were obtained including CBC, comprehensive metabolic panel, lactic acid, lipase and CRP all of which were normal with exception of slightly elevated ALT.  Abdomen flat and upright was normal.  The case was discussed at length with the rheumatology on-call who felt this was quite unlikely to  represent Behcet'ss as an etiology for vomiting blood.  More likely, as this occurred at the end of vomiting, mild Sandra-Tamayo type tear.  Patient will be discharged on Zofran and instructions to return with recurrence of symptoms or to follow up with rheumatology as scheduled in 2 weeks.    I have reviewed the nursing notes.    I have reviewed the findings, diagnosis, plan and need for follow up with the patient.    Discharge Medication List as of 4/14/2017  3:12 PM          Final diagnoses:   Hematemesis with nausea     IЕлена , am serving as a trained medical scribe to document services personally performed by Hussein Meehan MD, based on the provider's statements to me.   IHussein MD, was physically present and have reviewed and verified the accuracy of this note documented by Елена Briceno.    4/14/2017   [facility], EMERGENCY DEPARTMENT     Hussein Meehan MD  04/14/17 [id number]

## 2017-04-15 ENCOUNTER — TELEPHONE (OUTPATIENT)
Dept: NURSING | Facility: CLINIC | Age: 22
End: 2017-04-15

## 2017-04-15 ENCOUNTER — APPOINTMENT (OUTPATIENT)
Dept: CT IMAGING | Facility: CLINIC | Age: 22
End: 2017-04-15
Attending: EMERGENCY MEDICINE
Payer: COMMERCIAL

## 2017-04-15 ENCOUNTER — HOSPITAL ENCOUNTER (EMERGENCY)
Facility: CLINIC | Age: 22
Discharge: HOME | End: 2017-04-15
Attending: EMERGENCY MEDICINE | Admitting: EMERGENCY MEDICINE
Payer: COMMERCIAL

## 2017-04-15 VITALS
OXYGEN SATURATION: 100 % | HEIGHT: 63 IN | SYSTOLIC BLOOD PRESSURE: 105 MMHG | WEIGHT: 152.12 LBS | TEMPERATURE: 98.8 F | DIASTOLIC BLOOD PRESSURE: 67 MMHG | BODY MASS INDEX: 26.95 KG/M2 | HEART RATE: 114 BPM | RESPIRATION RATE: 18 BRPM

## 2017-04-15 DIAGNOSIS — M35.2 BEHCET'S DISEASE (H): ICD-10-CM

## 2017-04-15 DIAGNOSIS — R10.84 ABDOMINAL PAIN, GENERALIZED: ICD-10-CM

## 2017-04-15 LAB
ALBUMIN SERPL-MCNC: 3.9 G/DL (ref 3.4–5)
ALBUMIN UR-MCNC: NEGATIVE MG/DL
ALP SERPL-CCNC: 104 U/L (ref 40–150)
ALT SERPL W P-5'-P-CCNC: 56 U/L (ref 0–50)
ANION GAP SERPL CALCULATED.3IONS-SCNC: 10 MMOL/L (ref 3–14)
APPEARANCE UR: CLEAR
AST SERPL W P-5'-P-CCNC: 35 U/L (ref 0–45)
BACTERIA #/AREA URNS HPF: ABNORMAL /HPF
BASOPHILS # BLD AUTO: 0 10E9/L (ref 0–0.2)
BASOPHILS NFR BLD AUTO: 0.2 %
BILIRUB SERPL-MCNC: 0.6 MG/DL (ref 0.2–1.3)
BILIRUB UR QL STRIP: NEGATIVE
BUN SERPL-MCNC: 9 MG/DL (ref 7–30)
CALCIUM SERPL-MCNC: 8.7 MG/DL (ref 8.5–10.1)
CHLORIDE SERPL-SCNC: 104 MMOL/L (ref 94–109)
CO2 SERPL-SCNC: 26 MMOL/L (ref 20–32)
COLOR UR AUTO: YELLOW
CREAT SERPL-MCNC: 0.79 MG/DL (ref 0.52–1.04)
DIFFERENTIAL METHOD BLD: NORMAL
EOSINOPHIL # BLD AUTO: 0.1 10E9/L (ref 0–0.7)
EOSINOPHIL NFR BLD AUTO: 2.2 %
ERYTHROCYTE [DISTWIDTH] IN BLOOD BY AUTOMATED COUNT: 13.2 % (ref 10–15)
GFR SERPL CREATININE-BSD FRML MDRD: ABNORMAL ML/MIN/1.7M2
GLUCOSE SERPL-MCNC: 85 MG/DL (ref 70–99)
GLUCOSE UR STRIP-MCNC: NEGATIVE MG/DL
HCG UR QL: NEGATIVE
HCG UR QL: NEGATIVE
HCT VFR BLD AUTO: 38 % (ref 35–47)
HGB BLD-MCNC: 12.6 G/DL (ref 11.7–15.7)
HGB UR QL STRIP: NEGATIVE
IMM GRANULOCYTES # BLD: 0 10E9/L (ref 0–0.4)
IMM GRANULOCYTES NFR BLD: 0 %
INTERNAL QC OK POCT: YES
KETONES UR STRIP-MCNC: 10 MG/DL
LACTATE BLD-SCNC: 0.9 MMOL/L (ref 0.7–2.1)
LEUKOCYTE ESTERASE UR QL STRIP: NEGATIVE
LIPASE SERPL-CCNC: 95 U/L (ref 73–393)
LYMPHOCYTES # BLD AUTO: 2.1 10E9/L (ref 0.8–5.3)
LYMPHOCYTES NFR BLD AUTO: 44.4 %
MCH RBC QN AUTO: 29.4 PG (ref 26.5–33)
MCHC RBC AUTO-ENTMCNC: 33.2 G/DL (ref 31.5–36.5)
MCV RBC AUTO: 89 FL (ref 78–100)
MONOCYTES # BLD AUTO: 0.2 10E9/L (ref 0–1.3)
MONOCYTES NFR BLD AUTO: 3.7 %
MUCOUS THREADS #/AREA URNS LPF: PRESENT /LPF
NEUTROPHILS # BLD AUTO: 2.3 10E9/L (ref 1.6–8.3)
NEUTROPHILS NFR BLD AUTO: 49.5 %
NITRATE UR QL: NEGATIVE
NRBC # BLD AUTO: 0 10*3/UL
NRBC BLD AUTO-RTO: 0 /100
PH UR STRIP: 5.5 PH (ref 5–7)
PLATELET # BLD AUTO: 234 10E9/L (ref 150–450)
POTASSIUM SERPL-SCNC: 3.8 MMOL/L (ref 3.4–5.3)
PROT SERPL-MCNC: 7.5 G/DL (ref 6.8–8.8)
RBC # BLD AUTO: 4.29 10E12/L (ref 3.8–5.2)
RBC #/AREA URNS AUTO: 1 /HPF (ref 0–2)
SODIUM SERPL-SCNC: 140 MMOL/L (ref 133–144)
SP GR UR STRIP: 1.01 (ref 1–1.03)
SQUAMOUS #/AREA URNS AUTO: 2 /HPF (ref 0–1)
TRANS CELLS #/AREA URNS HPF: <1 /HPF (ref 0–1)
URN SPEC COLLECT METH UR: ABNORMAL
UROBILINOGEN UR STRIP-MCNC: NORMAL MG/DL (ref 0–2)
WBC # BLD AUTO: 4.6 10E9/L (ref 4–11)
WBC #/AREA URNS AUTO: <1 /HPF (ref 0–2)

## 2017-04-15 PROCEDURE — 25000128 H RX IP 250 OP 636: Performed by: EMERGENCY MEDICINE

## 2017-04-15 PROCEDURE — 81025 URINE PREGNANCY TEST: CPT | Performed by: EMERGENCY MEDICINE

## 2017-04-15 PROCEDURE — 99284 EMERGENCY DEPT VISIT MOD MDM: CPT | Mod: Z6 | Performed by: EMERGENCY MEDICINE

## 2017-04-15 PROCEDURE — 80053 COMPREHEN METABOLIC PANEL: CPT | Performed by: EMERGENCY MEDICINE

## 2017-04-15 PROCEDURE — 40000556 ZZH STATISTIC PERIPHERAL IV START W US GUIDANCE

## 2017-04-15 PROCEDURE — 85025 COMPLETE CBC W/AUTO DIFF WBC: CPT | Performed by: EMERGENCY MEDICINE

## 2017-04-15 PROCEDURE — 99284 EMERGENCY DEPT VISIT MOD MDM: CPT | Mod: 25 | Performed by: EMERGENCY MEDICINE

## 2017-04-15 PROCEDURE — 83690 ASSAY OF LIPASE: CPT | Performed by: EMERGENCY MEDICINE

## 2017-04-15 PROCEDURE — 96360 HYDRATION IV INFUSION INIT: CPT | Performed by: EMERGENCY MEDICINE

## 2017-04-15 PROCEDURE — 74176 CT ABD & PELVIS W/O CONTRAST: CPT

## 2017-04-15 PROCEDURE — 81001 URINALYSIS AUTO W/SCOPE: CPT | Performed by: EMERGENCY MEDICINE

## 2017-04-15 PROCEDURE — 25000132 ZZH RX MED GY IP 250 OP 250 PS 637: Performed by: EMERGENCY MEDICINE

## 2017-04-15 PROCEDURE — 83605 ASSAY OF LACTIC ACID: CPT | Performed by: EMERGENCY MEDICINE

## 2017-04-15 RX ORDER — LORAZEPAM 0.5 MG/1
1 TABLET ORAL ONCE
Status: COMPLETED | OUTPATIENT
Start: 2017-04-15 | End: 2017-04-15

## 2017-04-15 RX ORDER — SODIUM CHLORIDE 9 MG/ML
1000 INJECTION, SOLUTION INTRAVENOUS CONTINUOUS
Status: DISCONTINUED | OUTPATIENT
Start: 2017-04-15 | End: 2017-04-15 | Stop reason: HOSPADM

## 2017-04-15 RX ADMIN — SODIUM CHLORIDE 1000 ML: 9 INJECTION, SOLUTION INTRAVENOUS at 13:27

## 2017-04-15 RX ADMIN — LORAZEPAM 1 MG: 0.5 TABLET ORAL at 11:33

## 2017-04-15 ASSESSMENT — ENCOUNTER SYMPTOMS
DYSURIA: 0
DIARRHEA: 0
FEVER: 1
APPETITE CHANGE: 1
ABDOMINAL PAIN: 1
VOMITING: 1
NAUSEA: 1
HEMATURIA: 0
BACK PAIN: 1
CONSTIPATION: 1
SHORTNESS OF BREATH: 0

## 2017-04-15 NOTE — DISCHARGE INSTRUCTIONS
Abdominal Pain  Abdominal pain is pain in the stomach or intestinal area. Everyone has this pain from time to time. In many cases it goes away on its own. But abdominal pain can sometimes be due to a serious problem, such as appendicitis. So it s important to know when to seek help.  Causes of abdominal pain  There are many possible causes of abdominal pain. Common causes in adults include:    Constipation, diarrhea, or gas    GERD (gastroesophageal reflux disease) movement of stomach acid into the esophagus, also known as acid reflux or heartburn    Peptic ulcer (a sore in the lining of the stomach or small intestine)    Inflammation of the gallbladder, liver, or pancreas    Gallstones or kidney stones    Appendicitis     Obstruction of the intestines     Hernia (bulging of an internal organ through a muscle or other tissue)    Urinary tract infections    In women, menstrual cramps, fibroids, or endometriosis of the uterus    Inflammation or infection of the intestines  Diagnosing the cause of abdominal pain  Your health care provider will examine you to help find the cause of your pain. If needed, tests will be ordered. Because abdominal pain has so many possible causes, it can be hard to discover the reason for the pain. Giving details about your pain can help. Be ready to tell your health care provider where and when you feel the pain and what makes it better or worse. Also mention whether you have other symptoms such as fever, tiredness, nausea, vomiting, or changes in bathroom habits.  Treating abdominal pain  Certain causes of pain, such as appendicitis or a bowel obstruction, need emergency treatment. Other problems can be treated with rest, fluids, or medications. Your health care provider can give you specific instructions for treatment or self-care based on the cause of your pain.  If you have vomiting or diarrhea, sip water or other clear fluids. When you are ready to eat solid foods again, start with  small amounts of easy-to-digest, low-fat foods, such as applesauce, toast, or crackers.   When to call the doctor  Call 911 or go to the hospital right away if you:    Can t pass stool and are vomiting    Are vomiting blood or have black, tarry diarrhea    Also have chest, neck, or shoulder pain    Feel like you are about to pass out    Have pain in your shoulder blades with nausea    Have sudden, excruciating abdominal pain    Have new, severe pain unlike any you have felt before    Have a belly that is rigid, hard, and tender to touch  Call your doctor if you have:    Pain for more than 5 days    Bloating for more than 2 days    Diarrhea for more than 5 days    Fever of 101 F (38.3 C) or higher    Pain that continues to worsen    Unexplained weight loss    Continued lack of appetite    Blood in the stool  How to prevent abdominal pain  Here are some tips to help prevent abdominal pain:    Eat smaller amounts of food at one time.    Avoid greasy, fried, or other high-fat foods.    Avoid foods that give you gas.    Exercise regularly.    Drink plenty of fluids.  To help prevent symptoms of gastroesophageal reflux disease (GERD):    Quit smoking.    Reduce alcohol and certain foods that increase stomach acid.     Lose excess weight.    Finish eating at least 2 hours before you go to bed or lie down.    Elevate the head of your bed.    0434-5936 [employer]. [address] [place] [address], [place] [address]. All rights reserved. This information is not intended as a substitute for professional medical care. Always follow your healthcare professional's instructions.

## 2017-04-15 NOTE — TELEPHONE ENCOUNTER
Call Type: Triage Call    Presenting Problem: Reviewed  [facility] discharge  instruction from 4/14/17 with Patient , she is having abdominal  swelling , and increase in pain but pain is in chest area instead of  abdomen and severe dizziness - advised 911 but Patient will have her  Mom drive her back to ER instead.  Triage Note:  Guideline Title: No Guideline - Advice Per Reference (Adult)  Recommended Disposition: Activate   Original Inclination: Did not know what to do  Override Disposition:  Intended Action: Go to Hospital / ED  Physician Contacted: No  ACTIVATE  ?  YES  Physician Instructions:  Care Advice:

## 2017-04-15 NOTE — ED AVS SNAPSHOT
[facility], Emergency Department    [address] [facility] [address]    Phone:  [phone]                                       Samara Oropeza   MRN: [id number]    Department:  [facility], Emergency Department   Date of Visit:  4/15/2017           Patient Information     Date Of Birth          1995        Your diagnoses for this visit were:     Abdominal pain, generalized        You were seen by Willard Smyth MD.      Follow-up Information     Schedule an appointment as soon as possible for a visit with Sonja Abreu APRN CNP.    Specialty:  Nurse Practitioner    Contact information:    [facility]  [address]  [facility] [address] [phone]          Follow up with [facility], Emergency Department.    Specialty:  EMERGENCY MEDICINE    Why:  If symptoms worsen    Contact information:    [address] [place] [id number] [phone]    Additional information:    The [facility] is located on the corner of [facility] and [facility] on the [facility]. It is easily accessible from virtually any point in the [facility] area, via [employer] and [employer].        Discharge Instructions         Abdominal Pain  Abdominal pain is pain in the stomach or intestinal area. Everyone has this pain from time to time. In many cases it goes away on its own. But abdominal pain can sometimes be due to a serious problem, such as appendicitis. So it s important to know when to seek help.  Causes of abdominal pain  There are many possible causes of abdominal pain. Common causes in adults include:    Constipation, diarrhea, or gas    GERD (gastroesophageal reflux disease) movement of stomach acid into the esophagus, also known as acid reflux or heartburn    Peptic ulcer (a sore in the lining of the stomach or small intestine)    Inflammation of the gallbladder, liver, or pancreas    Gallstones or kidney  stones    Appendicitis     Obstruction of the intestines     Hernia (bulging of an internal organ through a muscle or other tissue)    Urinary tract infections    In women, menstrual cramps, fibroids, or endometriosis of the uterus    Inflammation or infection of the intestines  Diagnosing the cause of abdominal pain  Your health care provider will examine you to help find the cause of your pain. If needed, tests will be ordered. Because abdominal pain has so many possible causes, it can be hard to discover the reason for the pain. Giving details about your pain can help. Be ready to tell your health care provider where and when you feel the pain and what makes it better or worse. Also mention whether you have other symptoms such as fever, tiredness, nausea, vomiting, or changes in bathroom habits.  Treating abdominal pain  Certain causes of pain, such as appendicitis or a bowel obstruction, need emergency treatment. Other problems can be treated with rest, fluids, or medications. Your health care provider can give you specific instructions for treatment or self-care based on the cause of your pain.  If you have vomiting or diarrhea, sip water or other clear fluids. When you are ready to eat solid foods again, start with small amounts of easy-to-digest, low-fat foods, such as applesauce, toast, or crackers.   When to call the doctor  Call 911 or go to the hospital right away if you:    Can t pass stool and are vomiting    Are vomiting blood or have black, tarry diarrhea    Also have chest, neck, or shoulder pain    Feel like you are about to pass out    Have pain in your shoulder blades with nausea    Have sudden, excruciating abdominal pain    Have new, severe pain unlike any you have felt before    Have a belly that is rigid, hard, and tender to touch  Call your doctor if you have:    Pain for more than 5 days    Bloating for more than 2 days    Diarrhea for more than 5 days    Fever of 101 F (38.3 C) or  higher    Pain that continues to worsen    Unexplained weight loss    Continued lack of appetite    Blood in the stool  How to prevent abdominal pain  Here are some tips to help prevent abdominal pain:    Eat smaller amounts of food at one time.    Avoid greasy, fried, or other high-fat foods.    Avoid foods that give you gas.    Exercise regularly.    Drink plenty of fluids.  To help prevent symptoms of gastroesophageal reflux disease (GERD):    Quit smoking.    Reduce alcohol and certain foods that increase stomach acid.     Lose excess weight.    Finish eating at least 2 hours before you go to bed or lie down.    Elevate the head of your bed.    4618-7988 [employer]. [address] [place] [address]. All rights reserved. This information is not intended as a substitute for professional medical care. Always follow your healthcare professional's instructions.          Future Appointments        Provider Department Dept Phone Center    4/18/2017 10:10 AM Brittny James  [facility] [phone] [facility]UI[facility] ALLEN    4/19/2017 8:30 AM Layla Browne [facility] [phone] [facility]    4/19/2017 2:30 PM Angela Nova [facility] [phone] [facility]    4/21/2017 11:00 AM EVI Vizcaino CNP [facility] Gastro[facility]ology and [phone] [facility]    4/24/2017 11:30 AM Layla Browne [facility] [phone] [facility]    4/25/2017 11:00 AM Andres Johnson DPM, [facility]IN[facility] [phone] [facility]    6/22/2017 1:50 PM Frederick Bender MD [facility] Physical Medicine and Rehabilitation [phone] [facility]      24 Hour Appointment Hotline       To make an appointment at any [facility], call [phone] ([phone]). If you don't have a family doctor or clinic, we  will help you find one. [facility] are conveniently located to serve the needs of you and your family.             Review of your medicines      Our records show that you are taking the medicines listed below. If these are incorrect, please call your family doctor or clinic.        Dose / Directions Last dose taken    albuterol 108 (90 BASE) MCG/ACT Inhaler   Commonly known as:  PROAIR HFA/PROVENTIL HFA/VENTOLIN HFA   Dose:  2 puff   Quantity:  1 Inhaler        Inhale 2 puffs into the lungs every 6 hours as needed for shortness of breath / dyspnea or wheezing   Refills:  1        amitriptyline 25 MG tablet   Commonly known as:  ELAVIL   Dose:  25 mg   Quantity:  45 tablet        Take 1 tablet (25 mg) by mouth At Bedtime May increase to 2, if no benefit after 2 weeks.  Feel free to call / page the nurse for Dr. Mcmahon at [phone] / [phone] with questions regarding this order.   Refills:  3        * Apremilast 10 & 20 & 30 MG Tbpk   Commonly known as:  OTEZLA   Quantity:  27 tablet        Take one tablet in the morning on day 1, then take one tablet every 12 hours on days 2 thru 14, according to the instructions on the packet.   Refills:  0        * apremilast 30 MG tablet   Commonly known as:  OTEZLA   Quantity:  60 tablet        20 mg in AM and 30mg in PM daily X 2 weeks and then 30mg twice daily.   Refills:  3        betamethasone valerate 0.1 % cream   Commonly known as:  VALISONE        Apply topically 2 times daily   Refills:  0        * botulinum toxin type A 100 UNITS injection   Commonly known as:  BOTOX   Dose:  200 Units   Quantity:  200 Units        Inject 200 Units into the muscle every 3 months   Refills:  3        * BOTOX IJ   Dose:  150 Units        Inject 150 Units into the muscle once Lot: /C3 Exp: 05/2019   Refills:  0        * BOTOX IJ   Dose:  150 Units        Inject 150 Units into the muscle Lot # /[id number]  Exp: 8/2019   Refills:  0        * BOTOX IJ   Dose:  162.5 Units         Inject 162.5 Units as directed once Lot #: /C3 Exp: 10/2019   Refills:  0        carbamide peroxide 6.5 % otic solution   Commonly known as:  DEBROX   Dose:  5 drop        5 drops 2 times daily   Refills:  0        clobetasol 0.05 % cream   Commonly known as:  TEMOVATE   Quantity:  60 g        Apply topically 2 times daily   Refills:  0        Colchicine 0.6 MG Caps   Dose:  1 capsule   Quantity:  90 capsule        Take 0.6 mg by mouth See Admin Instructions 2 capsules in AM and 1 capsule in PM   Refills:  3        dexamethasone 4 MG/ML injection   Commonly known as:  DECADRON   Dose:  4 mg   Quantity:  30 mL        Apply 1 mL (4 mg) topically as needed   Refills:  0        * dicyclomine 20 MG tablet   Commonly known as:  BENTYL   Dose:  20 mg   Quantity:  90 tablet        Take 1 tablet (20 mg) by mouth every 6 hours   Refills:  1        * dicyclomine 10 MG capsule   Commonly known as:  BENTYL   Dose:  10-20 mg   Quantity:  120 capsule        Take 1-2 capsules (10-20 mg) by mouth 2 times daily as needed   Refills:  3        EPINEPHrine 0.3 MG/0.3ML injection   Commonly known as:  EPIPEN   Dose:  0.3 mg   Quantity:  2 each        Inject 0.3 mLs into the muscle once as needed for anaphylaxis for 1 dose. And call 911.   Refills:  1        EXCEDRIN MIGRAINE PO        Take by mouth as needed   Refills:  0        guanFACINE 1 MG tablet   Commonly known as:  TENEX   Quantity:  180 tablet        3 tablets in the morning and 3 tablets in the evening   Refills:  3        hyoscyamine 0.125 MG tablet   Commonly known as:  ANASPAZ/LEVSIN   Dose:  0.125-0.25 mg   Quantity:  40 tablet        Take 1-2 tablets (125-250 mcg) by mouth every 4 hours as needed for cramping   Refills:  1        hypromellose 0.3 % Soln ophthalmic solution   Commonly known as:  GENTEAL   Dose:  1 drop        1 drop every hour as needed   Refills:  0        LACTAID PO        Take by mouth daily   Refills:  0        lidocaine 2 % topical gel    Commonly known as:  XYLOCAINE   Dose:  1 Tube        Apply 1 Tube topically daily   Refills:  0        lidocaine visc 2% 2.5mL/5mL & maalox/mylanta w/ simeth 2.5mL/5mL & diphenhydrAMINE 5mg/5mL Susp suspension   Commonly known as:  MAGIC Mouthwash   Dose:  10 mL   Quantity:  1 Bottle        Swish and swallow 10 mLs in mouth every 6 hours as needed for mouth sores   Refills:  1        linaclotide 290 MCG capsule   Commonly known as:  LINZESS   Dose:  290 mcg   Quantity:  30 capsule        Take 1 capsule (290 mcg) by mouth every morning (before breakfast)   Refills:  1        * LORazepam 1 MG tablet   Commonly known as:  ATIVAN   Dose:  1-2 mg   Quantity:  90 tablet        Take 1-2 tablets (1-2 mg) by mouth every 8 hours as needed for other (abdominal pain) Do not operate a vehicle after taking this medication   Refills:  0        * LORazepam 1 MG tablet   Commonly known as:  ATIVAN   Dose:  1 mg   Quantity:  90 tablet        Take 1 tablet (1 mg) by mouth every 8 hours as needed for anxiety   Refills:  0        lubiprostone 24 MCG capsule   Commonly known as:  AMITIZA   Dose:  24 mcg   Quantity:  60 capsule        Take 1 capsule (24 mcg) by mouth 2 times daily (with meals)   Refills:  3        meclizine 25 MG tablet   Commonly known as:  ANTIVERT   Dose:  25 mg   Quantity:  30 tablet        Take 1 tablet (25 mg) by mouth every 6 hours as needed for dizziness   Refills:  1        metoclopramide 5 MG tablet   Commonly known as:  REGLAN   Dose:  5 mg   Quantity:  60 tablet        Take 1 tablet (5 mg) by mouth 2 times daily as needed   Refills:  2        Multi-vitamin Tabs tablet   Dose:  1 tablet        Take 1 tablet by mouth daily Reported on 3/21/2017   Refills:  0        norgestimate-ethinyl estradiol 0.25-35 MG-MCG per tablet   Commonly known as:  ORTHO-CYCLEN, SPRINTEC   Dose:  1 tablet   Quantity:  84 tablet        Take 1 tablet by mouth daily   Refills:  3        omeprazole 40 MG capsule   Commonly known as:   priLOSEC   Dose:  40 mg   Quantity:  30 capsule        Take 1 capsule (40 mg) by mouth daily   Refills:  9        ondansetron 4 MG ODT tab   Commonly known as:  ZOFRAN-ODT   Dose:  4 mg   Quantity:  20 tablet        Take 1 tablet (4 mg) by mouth every 6 hours as needed for nausea   Refills:  0        * order for DME   Quantity:  1 Units        Equipment being ordered: left boot for ankle and foot immobilization   Refills:  0        * order for DME   Quantity:  1 Device        Equipment being ordered: trilock ankle brace   Refills:  0        polyethylene glycol powder   Commonly known as:  MIRALAX/GLYCOLAX   Dose:  17 g        Take 17 g by mouth 2 times daily   Refills:  0        promethazine 25 MG tablet   Commonly known as:  PHENERGAN   Dose:  12.5-25 mg   Quantity:  20 tablet        Take 0.5-1 tablets (12.5-25 mg) by mouth every 6 hours as needed for nausea   Refills:  1        psyllium 63 % Powd   Commonly known as:  METAMUCIL SMOOTH TEXTURE   Dose:  3 teaspoonful   Quantity:  1 Bottle        Take 3 teaspoonful by mouth 3 times daily Mix in 8 ounces of water   Refills:  11        RANITIDINE 75 PO        Take  by mouth. As needed for GI upset   Refills:  0        sucralfate 1 GM/10ML suspension   Commonly known as:  CARAFATE   Dose:  1 g   Quantity:  1200 mL        Take 10 mLs (1 g) by mouth 4 times daily   Refills:  2        SUMAtriptan 100 MG tablet   Commonly known as:  IMITREX   Dose:  100 mg   Quantity:  18 tablet        Take 1 tablet (100 mg) by mouth at onset of headache for migraine May repeat in 2 hours. Max 2 tablets/24 hours.   Refills:  3        triamcinolone 0.1 % cream   Commonly known as:  KENALOG   Quantity:  15 g        Apply sparingly to oral ulcers three times daily for 14 days as needed.   Refills:  1        UNABLE TO FIND        daily MEDICATION NAME: Peppermint supplement   Refills:  0        VITAMIN B6 PO        Refills:  0        * Notice:  This list has 12 medication(s) that are the same  as other medications prescribed for you. Read the directions carefully, and ask your doctor or other care provider to review them with you.            Procedures and tests performed during your visit     Procedure/Test Number of Times Performed    CBC with platelets differential 1    CT Abdomen Pelvis w/o Contrast 1    Comprehensive metabolic panel 1    HCG qualitative urine 1    Lactic acid whole blood 1    Lipase 1    UA with Microscopic 1    Vascular Access Care Adult IP Consult 2    hCG qual urine POCT 1      Orders Needing Specimen Collection     None      Pending Results     Date and Time Order Name Status Description    4/15/2017 1054 HCG qualitative urine In process             Pending Culture Results     Date and Time Order Name Status Description    4/15/2017 1054 HCG qualitative urine In process             Thank you for choosing [facility]       Thank you for choosing [facility] for your care. Our goal is always to provide you with excellent care. Hearing back from our patients is one way we can continue to improve our services. Please take a few minutes to complete the written survey that you may receive in the mail after you visit with us. Thank you!        [employer] Information     [employer] gives you secure access to your electronic health record. If you see a primary care provider, you can also send messages to your care team and make appointments. If you have questions, please call your primary care clinic.  If you do not have a primary care provider, please call [phone] and they will assist you.        Care EveryWhere ID     This is your Care EveryWhere ID. This could be used by other organizations to access your [facility] medical records  [id number]        After Visit Summary       This is your record. Keep this with you and show to your community pharmacist(s) and doctor(s) at your next visit.

## 2017-04-15 NOTE — ED AVS SNAPSHOT
[facility], [place], Emergency Department    [address] [place] [id number]    Phone:  [phone]                                       Samara Oropeza   MRN: [id number]    Department:  [facility], Emergency Department   Date of Visit:  4/15/2017           After Visit Summary Signature Page     I have received my discharge instructions, and my questions have been answered. I have discussed any challenges I see with this plan with the nurse or doctor.    ..........................................................................................................................................  Patient/Patient Representative Signature      ..........................................................................................................................................  Patient Representative Print Name and Relationship to Patient    ..................................................               ................................................  Date                                            Time    ..........................................................................................................................................  Reviewed by Signature/Title    ...................................................              ..............................................  Date                                                            Time

## 2017-04-15 NOTE — ED PROVIDER NOTES
History     Chief Complaint   Patient presents with     Abdominal Pain     HPI  Samara Oropeza is a 21 year old female with Behcet's disease presenting to the ED for evaluation of abdominal pain. The patient states that she was here yesterday in the ED for the same problem, but has experienced no relief since then. The patient states that she has been experiencing diarrhea, nausea, low grade fevers, and severe stomach pain upon eating for the past few days. She states that she vomited some blood yesterday as well. The patient states that she woke up in the middle of the night last night with a stabbing pain in her stomach and some SOB, after which she used her inhaler and took 2 mg of ativan. She states that she has had episodes of this before, but claims that they have never been this bad. The patient also states that she has had a decreased appetite over the past 3 days, articulating that she has only eaten twice within that time period. The patient denies any dysuria, hematuria, or current diarrhea. The patient endorses constipation (not a new complaint) and upper back pain since yesterday.      PAST MEDICAL HISTORY:   Past Medical History:   Diagnosis Date     Anxiety      Arthritis      Behcet's disease (H)      Cervical adenitis May 2010     Chronic abdominal pain      Constipation, chronic 1994     Gastro-oesophageal reflux disease      Gastroparesis      Migraines      Neuromuscular disorder (H)     fibramyalgia     Palpitations      Syncope      Tourette's        PAST SURGICAL HISTORY:   Past Surgical History:   Procedure Laterality Date     ARTHROSCOPY KNEE WITH PATELLAR REALIGNMENT  7/25/2013    Procedure: ARTHROSCOPY KNEE WITH PATELLAR REALIGNMENT;  Left Knee Arthroscopy, Medial Patellofemoral Ligament Reconstruction with Allograft  ;  Surgeon: Jennifer Acevedo MD;  Location: [facility]     DENTAL SURGERY  1996    Teeth removal     ENDOSCOPY UPPER, COLONOSCOPY, COMBINED  2005      ESOPH/GAS  REFLUX TEST W NASAL IMPED >1 HR N/A 2/15/2017    Procedure: ESOPHAGEAL IMPEDENCE FUNCTION TEST WITH 24 HOUR PH GREATER THAN 1 HOUR;  Surgeon: Timothy Matta MD;  Location:  GI       FAMILY HISTORY:   Family History   Problem Relation Age of Onset     Depression Mother      Neurologic Disorder Mother      Migraines, take imitrex injection.  Also in maternal grandmother.       Alcohol/Drug Father      Hypertension Father      Depression Father      Cardiovascular Maternal Grandmother      Depression Maternal Grandmother      Hypertension Maternal Grandmother      Alzheimer Disease Maternal Grandmother      Cardiovascular Maternal Grandfather      Hypertension Maternal Grandfather      Depression Maternal Grandfather      Alcohol/Drug Maternal Grandfather      Cardiovascular Paternal Grandmother      Hypertension Paternal Grandmother      Cardiovascular Paternal Grandfather      Hypertension Paternal Grandfather      Glaucoma No family hx of      Macular Degeneration No family hx of        SOCIAL HISTORY:   Social History   Substance Use Topics     Smoking status: Never Smoker     Smokeless tobacco: Never Used     Alcohol use Yes      Comment: seldom       Patient's Medications   New Prescriptions    No medications on file   Previous Medications    ALBUTEROL (PROAIR HFA/PROVENTIL HFA/VENTOLIN HFA) 108 (90 BASE) MCG/ACT INHALER    Inhale 2 puffs into the lungs every 6 hours as needed for shortness of breath / dyspnea or wheezing    AMITRIPTYLINE (ELAVIL) 25 MG TABLET    Take 1 tablet (25 mg) by mouth At Bedtime May increase to 2, if no benefit after 2 weeks.  Feel free to call / page the nurse for Dr. Mcmahon at [phone] / [phone] with questions regarding this order.    APREMILAST (OTEZLA) 10 & 20 & 30 MG TBPK    Take one tablet in the morning on day 1, then take one tablet every 12 hours on days 2 thru 14, according to the instructions on the packet.    APREMILAST (OTEZLA) 30 MG TABLET    20 mg in AM  and 30mg in PM daily X 2 weeks and then 30mg twice daily.    ASPIRIN-ACETAMINOPHEN-CAFFEINE (EXCEDRIN MIGRAINE PO)    Take by mouth as needed     BETAMETHASONE VALERATE (VALISONE) 0.1 % CREAM    Apply topically 2 times daily    BOTULINUM TOXIN TYPE A (BOTOX) 100 UNITS INJECTION    Inject 200 Units into the muscle every 3 months    CARBAMIDE PEROXIDE (DEBROX) 6.5 % OTIC SOLUTION    5 drops 2 times daily    CLOBETASOL (TEMOVATE) 0.05 % CREAM    Apply topically 2 times daily    COLCHICINE 0.6 MG CAPS    Take 0.6 mg by mouth See Admin Instructions 2 capsules in AM and 1 capsule in PM    DEXAMETHASONE (DECADRON) 4 MG/ML INJECTION    Apply 1 mL (4 mg) topically as needed    DICYCLOMINE (BENTYL) 10 MG CAPSULE    Take 1-2 capsules (10-20 mg) by mouth 2 times daily as needed    DICYCLOMINE (BENTYL) 20 MG TABLET    Take 1 tablet (20 mg) by mouth every 6 hours    EPINEPHRINE (EPIPEN) 0.3 MG/0.3ML INJECTION    Inject 0.3 mLs into the muscle once as needed for anaphylaxis for 1 dose. And call 911.    GUANFACINE (TENEX) 1 MG TABLET    3 tablets in the morning and 3 tablets in the evening    HYOSCYAMINE (ANASPAZ,LEVSIN) 0.125 MG TABLET    Take 1-2 tablets (125-250 mcg) by mouth every 4 hours as needed for cramping    HYPROMELLOSE (GENTEAL) 0.3 % SOLN    1 drop every hour as needed    LACTASE (LACTAID PO)    Take by mouth daily    LIDOCAINE (XYLOCAINE) 2 % JELLY    Apply 1 Tube topically daily    LINACLOTIDE (LINZESS) 290 MCG CAPSULE    Take 1 capsule (290 mcg) by mouth every morning (before breakfast)    LORAZEPAM (ATIVAN) 1 MG TABLET    Take 1-2 tablets (1-2 mg) by mouth every 8 hours as needed for other (abdominal pain) Do not operate a vehicle after taking this medication    LORAZEPAM (ATIVAN) 1 MG TABLET    Take 1 tablet (1 mg) by mouth every 8 hours as needed for anxiety    LUBIPROSTONE (AMITIZA) 24 MCG CAPSULE    Take 1 capsule (24 mcg) by mouth 2 times daily (with meals)    MAGIC MOUTHWASH (FV STD FORMULA) LIDOCAINE VISC 2%  2.5ML/5ML & MAALOX/MYLANTA W/ SIMETH 2.5ML/5ML & DIPHENHYDRAMINE 5MG/5ML    Swish and swallow 10 mLs in mouth every 6 hours as needed for mouth sores    MECLIZINE (ANTIVERT) 25 MG TABLET    Take 1 tablet (25 mg) by mouth every 6 hours as needed for dizziness    METOCLOPRAMIDE (REGLAN) 5 MG TABLET    Take 1 tablet (5 mg) by mouth 2 times daily as needed    MULTIVITAMIN, THERAPEUTIC WITH MINERALS (MULTI-VITAMIN) TABS    Take 1 tablet by mouth daily Reported on 3/21/2017    NORGESTIMATE-ETHINYL ESTRADIOL (ORTHO-CYCLEN, SPRINTEC) 0.25-35 MG-MCG PER TABLET    Take 1 tablet by mouth daily    OMEPRAZOLE (PRILOSEC) 40 MG CAPSULE    Take 1 capsule (40 mg) by mouth daily    ONABOTULINUMTOXINA (BOTOX IJ)    Inject 150 Units into the muscle once Lot: /C3 Exp: 05/2019    ONABOTULINUMTOXINA (BOTOX IJ)    Inject 150 Units into the muscle Lot # /C3  Exp: 8/2019    ONABOTULINUMTOXINA (BOTOX IJ)    Inject 162.5 Units as directed once Lot #: /C3  Exp: 10/2019    ONDANSETRON (ZOFRAN-ODT) 4 MG ODT TAB    Take 1 tablet (4 mg) by mouth every 6 hours as needed for nausea    ORDER FOR DME    Equipment being ordered: left boot for ankle and foot immobilization    ORDER FOR DME    Equipment being ordered: trilock ankle brace    POLYETHYLENE GLYCOL (MIRALAX/GLYCOLAX) POWDER    Take 17 g by mouth 2 times daily    PROMETHAZINE (PHENERGAN) 25 MG TABLET    Take 0.5-1 tablets (12.5-25 mg) by mouth every 6 hours as needed for nausea    PSYLLIUM (METAMUCIL SMOOTH TEXTURE) 63 % POWD    Take 3 teaspoonful by mouth 3 times daily Mix in 8 ounces of water    PYRIDOXINE HCL (VITAMIN B6 PO)        RANITIDINE HCL (RANITIDINE 75 PO)    Take  by mouth. As needed for GI upset    SUCRALFATE (CARAFATE) 1 GM/10ML SUSPENSION    Take 10 mLs (1 g) by mouth 4 times daily    SUMATRIPTAN (IMITREX) 100 MG TABLET    Take 1 tablet (100 mg) by mouth at onset of headache for migraine May repeat in 2 hours. Max 2 tablets/24 hours.    TRIAMCINOLONE (KENALOG) 0.1  % CREAM    Apply sparingly to oral ulcers three times daily for 14 days as needed.    UNABLE TO FIND    daily MEDICATION NAME: Peppermint supplement   Modified Medications    No medications on file   Discontinued Medications    LIDOCAINE (LIDODERM) 5 % PATCH    Apply up to 3 patches to painful area at once for up to 12 h within a 24 h period.  Remove after 12 hours.          Allergies   Allergen Reactions     Amoxil [Penicillins] Rash     Dad unsure of reaction.     Contrast Dye Rash     Contrast Media Ready-[facility] [place], 04/09/2014.; Contrast Media Ready-[facility] [place], 04/09/2014.  NOTE: this is a contrast media oral with iodine. Premedicate with methylpred standard for IV contrast, request barium contrast for oral contrast.     Kiwi Swelling     Orange Fruit [Citrus] Anaphylaxis     Pineapple Anaphylaxis, Difficulty breathing and Rash     Milk Protein Extract Hives     Reglan [Metoclopramide] Other (See Comments)     IV dose only, in ER, rapid heart rate.     Ace Inhibitors      Difficulty in breathing and GI upset     Amitiza [Lubiprostone] Nausea and Vomiting     Amoxicillin-Pot Clavulanate      Latex      Midazolam Unknown     parent states that when pt takes this medication, she wakes up being very violent .     Versed      Coming out of pelvic exam at age of 5, was kicking and screaming when coming out of the versed.     Adhesive Tape Rash     Azithromycin Hives and Rash     Cephalexin Itching and Rash     Itchy mouth     Keflex [Cephalexin-Fd&C Yellow #6] Hives         I have reviewed the Medications, Allergies, Past Medical and Surgical History, and Social History in the Epic system.    Review of Systems   Constitutional: Positive for appetite change (lessened) and fever.   Respiratory: Negative for shortness of breath.    Cardiovascular: Negative for chest pain.   Gastrointestinal: Positive for abdominal pain, constipation, nausea and vomiting (Noted blood yesterday). Negative for diarrhea.   Genitourinary:  "Negative for dysuria and hematuria.   Musculoskeletal: Positive for back pain.   All other systems reviewed and are negative.      Physical Exam   BP: 119/84  Pulse: 114  Temp: 98.8  F (37.1  C)  Resp: 18  Height: 160 cm (5' 3\")  Weight: 69 kg (152 lb 1.9 oz)  SpO2: 99 %  Physical Exam  Gen: NAD, sitting on stretcher, conversant and pleasant, non-toxic appearing  HEENT: NCAT, PERRL, EOMI, MMM  Neck: trachea midline, supple with FROM  Cardio: normal rate regular rhythm, no R/M/G, normally perfused and warm  Pulm: steady, non-labored respirations, normal WOB, CTAB, no w/r/r  Abd: soft nt/nd, no organomegaly, no CVA tenderness  Ext: normal peripheral pulses, no edema, neurovascularly intact distally.  Skin: no rashes or signs of trauma  Neuro: no focal deficits, 5/5 strength in all ext    ED Course     ED Course     Procedures           Labs Ordered and Resulted from Time of ED Arrival Up to the Time of Departure from the ED   COMPREHENSIVE METABOLIC PANEL - Abnormal; Notable for the following:        Result Value    ALT 56 (*)     All other components within normal limits   ROUTINE UA WITH MICROSCOPIC - Abnormal; Notable for the following:     Ketones Urine 10 (*)     Bacteria Urine Few (*)     Squamous Epithelial /HPF Urine 2 (*)     Mucous Urine Present (*)     All other components within normal limits   HCG QUAL URINE POCT - Normal   LIPASE   LACTIC ACID WHOLE BLOOD   CBC WITH PLATELETS DIFFERENTIAL       Assessments & Plan (with Medical Decision Making)   21 year old female with history of fibromyalgia, RA, Reynaud s, Chronic abdominal pain, IBS, and Behcet s disease who presents to the Emergency Department with an acute exacerbation of her chronic abdominal pain. Of note, she was seen here yesterday for similar symptoms. She was feeling better and had normal labs, so she was discharged to home. Today, she represented with the same symptoms. On exam, she is remarkably well of hearing, with normal vital signs, " afebrile, with a soft abdomen. I have low suspicion for acute intraabdominal surgical pathology. However, the patient came back with similar symptoms and mom is very concerned and would like a CT scan. Given her complex medical history, we will obtain labs, urinalysis, and imaging to evaluate for intraabdominal infection or surgical pathology. After history and physical examination, the patient was given Ativan for her anxiety and IV was established. She was given IV fluids and basic labs were obtained and unremarkable including a chemistry, CBC, lipase and LFTs. Urinalysis was obtained without any evidence of UTI. It was sent for culture. Lactate was negative. CT scan of her abdomen without any acute intraabdominal pathologies. She did has some nonspecific mesenteric lymphadenopathy likely related to her irritable bowel syndrome. After evaluation, patient was feeling much better, so she was discharged home at this time in good condition with close follow up with primary care. Next week she does see her GI doc.      This part of the document was transcribed by Nicola Alvarez, Medical Scribe.     I have reviewed the nursing notes.    I have reviewed the findings, diagnosis, plan and need for follow up with the patient.    Discharge Medication List as of 4/15/2017  2:22 PM          Final diagnoses:   Abdominal pain, generalized   I, Nicola Alvarez, am serving as a trained medical scribe to document services personally performed by Willard Smyth MD, based on the provider's statements to me.      IWillard MD, was physically present and have reviewed and verified the accuracy of this note documented by Nicola Alvarez      4/15/2017   [facility], [place], EMERGENCY DEPARTMENT     Willard Smyth MD  04/15/17 0832

## 2017-04-17 NOTE — TELEPHONE ENCOUNTER
"[place] [facility] called to inquire about otezla Rx from Dr. Marquez. Specialty pharmacist informed that patient has been waiting on this medication for about 2 weeks and has been off of it for a few days.  The medication only comes in 30mg tabs and the \"titration pack\" not the requesting 20mg tablets.  Pharmacy requesting new Rx for titration pack and then standard Rx for the 30mg pills after titration pack is complete.   "

## 2017-04-18 ENCOUNTER — THERAPY VISIT (OUTPATIENT)
Dept: PHYSICAL THERAPY | Facility: CLINIC | Age: 22
End: 2017-04-18
Payer: COMMERCIAL

## 2017-04-18 DIAGNOSIS — M25.572 ACUTE LEFT ANKLE PAIN: ICD-10-CM

## 2017-04-18 PROCEDURE — 97112 NEUROMUSCULAR REEDUCATION: CPT | Mod: GP

## 2017-04-18 PROCEDURE — 97110 THERAPEUTIC EXERCISES: CPT | Mod: GP

## 2017-04-18 PROCEDURE — 97033 APP MDLTY 1+IONTPHRSIS EA 15: CPT | Mod: GP

## 2017-04-18 NOTE — PROGRESS NOTES
Subjective:    HPI                    Objective:    System    Physical Exam    General     ROS    Assessment/Plan:      Progress Note     Progress reporting period is from 3/31/17 to 4/18/17.       SUBJECTIVE  Subjective changes noted by patient:   Pt was in ED for bleeding in stomach 3 days ago. Has been able to do more walking. Reports that ankle is more sore along lateral ankle and post calf, also ant ankle. PT helps. Hasn't tried home TENS unit yet.     Current pain level is 5/10  .     Previous pain level was  6/10 Initial Pain level: 6/10.   Changes in function:  Yes (See Goal flowsheet attached for changes in current functional level)  Adverse reaction to treatment or activity: None    OBJECTIVE  Changes noted in objective findings:  Yes,   Objective: Good functional ROM except lacks active DF. DF -2 actively. Passively with gastroc and soleus stretch WNL. Inversion 4-/5, ever 3+/5. Hypersensitive to touch around ant-lat ankle      ASSESSMENT/PLAN  Updated problem list and treatment plan: Diagnosis 1:  L ankle sprain  Pain -  electric stimulation, splint/taping/bracing/orthotics, education and iontophoresis  Decreased ROM/flexibility - manual therapy, therapeutic exercise and home program  Decreased strength - therapeutic exercise, therapeutic activities and home program  Decreased proprioception - neuro re-education, therapeutic activities and home program  STG/LTGs have been met or progress has been made towards goals:  Yes (See Goal flow sheet completed today.)  Assessment of Progress: The patient's condition is improving.  Self Management Plans:  Patient has been instructed in a home treatment program.  I have re-evaluated this patient and find that the nature, scope, duration and intensity of the therapy is appropriate for the medical condition of the patient.  Samara continues to require the following intervention to meet STG and LTG's:  PT    Recommendations:  This patient would benefit from  continued therapy.     Frequency:  1 X week, once daily  Duration:  for 2-4 visits      The progress note/discharge summary was written in collaboration with and reviewed by the physical therapist.    Please refer to the daily flowsheet for treatment today, total treatment time and time spent performing 1:1 timed codes.

## 2017-04-18 NOTE — MR AVS SNAPSHOT
After Visit Summary   4/18/2017    Samara Oropeza    MRN: [id number]           Patient Information     Date Of Birth          1995        Visit Information        Provider Department      4/18/2017 10:10 AM Cristy Pineda [facility]        Today's Diagnoses     Acute left ankle pain           Follow-ups after your visit        Your next 10 appointments already scheduled     Apr 19, 2017  8:30 AM CDT   Return Visit with ALISA Burt   [facility] ([facility])    [address] [id number] [phone]            Apr 19, 2017  2:30 PM CDT   Office Visit with Angela Nova   [facility] ([facility])    [address] [place] [address] [phone]           Bring a current list of meds and any records pertaining to this visit.  For Physicals, please bring immunization records and any forms needing to be filled out.  Please arrive 10 minutes early to complete paperwork.            Apr 21, 2017 11:00 AM CDT   (Arrive by 10:45 AM)   Return Visit with EVI Vizcaino [facility] ([facility])    [address] [id number] [phone]            Apr 24, 2017 11:30 AM CDT   Return Visit with ALISA Burt   [facility] ([facility])    [address] [id number] [phone]            Apr 25, 2017 11:00 AM CDT   Return Visit with Andres Johnson DPM   [facility] ([facility])    [address] [address] [address]  [facility] [address] [phone]            Jun 22, 2017  1:50 PM CDT   (Arrive by 1:35 PM)   Return Botox with Frederick Villaseñor  MD [place]   [facility] ([facility])    [address] [facility]  [address] [facility] [address] [phone]              Who to contact     If you have questions or need follow up information about today's clinic visit or your schedule please contact [facility] directly at [phone].  Normal or non-critical lab and imaging results will be communicated to you by [employer], letter or phone within 4 business days after the clinic has received the results. If you do not hear from us within 7 days, please contact the clinic through [employer]t or phone. If you have a critical or abnormal lab result, we will notify you by phone as soon as possible.  Submit refill requests through [employer] or call your pharmacy and they will forward the refill request to us. Please allow 3 business days for your refill to be completed.          Additional Information About Your Visit        [employer] Information     [employer] gives you secure access to your electronic health record. If you see a primary care provider, you can also send messages to your care team and make appointments. If you have questions, please call your primary care clinic.  If you do not have a primary care provider, please call [phone] and they will assist you.        Care EveryWhere ID     This is your Care EveryWhere ID. This could be used by other organizations to access your [place] medical records  [id number]        Your Vitals Were     Last Period                   04/07/2017            Blood Pressure from Last 3 Encounters:   04/15/17 105/67   04/14/17 124/85   03/31/17 106/73    Weight from Last 3 Encounters:   04/15/17 69 kg (152 lb 1.9 oz)   04/14/17 68 kg (150 lb)   03/28/17 69.4 kg (153 lb)              We Performed the Following     Iontophoresis     Neuromuscular Re-Education     Therapeutic Exercises        Primary Care Provider Office  Phone # Fax #    EVI Cardona [phone]       [facility] [address]  [facility] [address]        Thank you!     Thank you for choosing [facility]  for your care. Our goal is always to provide you with excellent care. Hearing back from our patients is one way we can continue to improve our services. Please take a few minutes to complete the written survey that you may receive in the mail after your visit with us. Thank you!             Your Updated Medication List - Protect others around you: Learn how to safely use, store and throw away your medicines at www.disposemymeds.org.          This list is accurate as of: 4/18/17 11:24 AM.  Always use your most recent med list.                   Brand Name Dispense Instructions for use    albuterol 108 (90 BASE) MCG/ACT Inhaler    PROAIR HFA/PROVENTIL HFA/VENTOLIN HFA    1 Inhaler    Inhale 2 puffs into the lungs every 6 hours as needed for shortness of breath / dyspnea or wheezing       amitriptyline 25 MG tablet    ELAVIL    45 tablet    Take 1 tablet (25 mg) by mouth At Bedtime May increase to 2, if no benefit after 2 weeks.  Feel free to call / page the nurse for Dr. Mcmahon at [phone] / [phone] with questions regarding this order.       * Apremilast 10 & 20 & 30 MG Tbpk    OTEZLA    27 tablet    Take one tablet in the morning on day 1, then take one tablet every 12 hours on days 2 thru 14, according to the instructions on the packet.       * apremilast 30 MG tablet    OTEZLA    60 tablet    20 mg in AM and 30mg in PM daily X 2 weeks and then 30mg twice daily.       betamethasone valerate 0.1 % cream    VALISONE     Apply topically 2 times daily       * botulinum toxin type A 100 UNITS injection    BOTOX    200 Units    Inject 200 Units into the muscle every 3 months       * BOTOX IJ      Inject 150 Units into the muscle once Lot: /C3 Exp: 05/2019        * BOTOX IJ      Inject 150 Units into the muscle Lot # /C3  Exp: 8/2019       * BOTOX IJ      Inject 162.5 Units as directed once Lot #: /C3 Exp: 10/2019       carbamide peroxide 6.5 % otic solution    DEBROX     5 drops 2 times daily       clobetasol 0.05 % cream    TEMOVATE    60 g    Apply topically 2 times daily       Colchicine 0.6 MG Caps     90 capsule    Take 0.6 mg by mouth See Admin Instructions 2 capsules in AM and 1 capsule in PM       dexamethasone 4 MG/ML injection    DECADRON    30 mL    Apply 1 mL (4 mg) topically as needed       * dicyclomine 20 MG tablet    BENTYL    90 tablet    Take 1 tablet (20 mg) by mouth every 6 hours       * dicyclomine 10 MG capsule    BENTYL    120 capsule    Take 1-2 capsules (10-20 mg) by mouth 2 times daily as needed       EPINEPHrine 0.3 MG/0.3ML injection    EPIPEN    2 each    Inject 0.3 mLs into the muscle once as needed for anaphylaxis for 1 dose. And call 911.       EXCEDRIN MIGRAINE PO      Take by mouth as needed       guanFACINE 1 MG tablet    TENEX    180 tablet    3 tablets in the morning and 3 tablets in the evening       hyoscyamine 0.125 MG tablet    ANASPAZ/LEVSIN    40 tablet    Take 1-2 tablets (125-250 mcg) by mouth every 4 hours as needed for cramping       hypromellose 0.3 % Soln ophthalmic solution    GENTEAL     1 drop every hour as needed       LACTAID PO      Take by mouth daily       lidocaine 2 % topical gel    XYLOCAINE     Apply 1 Tube topically daily       lidocaine visc 2% 2.5mL/5mL & maalox/mylanta w/ simeth 2.5mL/5mL & diphenhydrAMINE 5mg/5mL Susp suspension    [facility]    1 Bottle    Swish and swallow 10 mLs in mouth every 6 hours as needed for mouth sores       linaclotide 290 MCG capsule    LINZESS    30 capsule    Take 1 capsule (290 mcg) by mouth every morning (before breakfast)       * LORazepam 1 MG tablet    ATIVAN    90 tablet    Take 1-2 tablets (1-2 mg) by mouth every 8 hours as needed for other  (abdominal pain) Do not operate a vehicle after taking this medication       * LORazepam 1 MG tablet    ATIVAN    90 tablet    Take 1 tablet (1 mg) by mouth every 8 hours as needed for anxiety       lubiprostone 24 MCG capsule    AMITIZA    60 capsule    Take 1 capsule (24 mcg) by mouth 2 times daily (with meals)       meclizine 25 MG tablet    ANTIVERT    30 tablet    Take 1 tablet (25 mg) by mouth every 6 hours as needed for dizziness       metoclopramide 5 MG tablet    REGLAN    60 tablet    Take 1 tablet (5 mg) by mouth 2 times daily as needed       Multi-vitamin Tabs tablet      Take 1 tablet by mouth daily Reported on 3/21/2017       norgestimate-ethinyl estradiol 0.25-35 MG-MCG per tablet    ORTHO-CYCLEN, SPRINTEC    84 tablet    Take 1 tablet by mouth daily       omeprazole 40 MG capsule    priLOSEC    30 capsule    Take 1 capsule (40 mg) by mouth daily       ondansetron 4 MG ODT tab    ZOFRAN-ODT    20 tablet    Take 1 tablet (4 mg) by mouth every 6 hours as needed for nausea       * order for DME     1 Units    Equipment being ordered: left boot for ankle and foot immobilization       * order for DME     1 Device    Equipment being ordered: trilock ankle brace       polyethylene glycol powder    MIRALAX/GLYCOLAX     Take 17 g by mouth 2 times daily       promethazine 25 MG tablet    PHENERGAN    20 tablet    Take 0.5-1 tablets (12.5-25 mg) by mouth every 6 hours as needed for nausea       psyllium 63 % Powd    METAMUCIL SMOOTH TEXTURE    1 Bottle    Take 3 teaspoonful by mouth 3 times daily Mix in 8 ounces of water       RANITIDINE 75 PO      Take  by mouth. As needed for GI upset       sucralfate 1 GM/10ML suspension    CARAFATE    1200 mL    Take 10 mLs (1 g) by mouth 4 times daily       SUMAtriptan 100 MG tablet    IMITREX    18 tablet    Take 1 tablet (100 mg) by mouth at onset of headache for migraine May repeat in 2 hours. Max 2 tablets/24 hours.       triamcinolone 0.1 % cream    KENALOG    15 g     Apply sparingly to oral ulcers three times daily for 14 days as needed.       UNABLE TO FIND      daily MEDICATION NAME: Peppermint supplement       VITAMIN B6 PO          * Notice:  This list has 12 medication(s) that are the same as other medications prescribed for you. Read the directions carefully, and ask your doctor or other care provider to review them with you.

## 2017-04-19 ENCOUNTER — OFFICE VISIT (OUTPATIENT)
Dept: PHARMACY | Facility: CLINIC | Age: 22
End: 2017-04-19
Payer: COMMERCIAL

## 2017-04-19 ENCOUNTER — TELEPHONE (OUTPATIENT)
Dept: RHEUMATOLOGY | Facility: CLINIC | Age: 22
End: 2017-04-19

## 2017-04-19 ENCOUNTER — OFFICE VISIT (OUTPATIENT)
Dept: BEHAVIORAL HEALTH | Facility: CLINIC | Age: 22
End: 2017-04-19
Payer: COMMERCIAL

## 2017-04-19 DIAGNOSIS — F41.1 GAD (GENERALIZED ANXIETY DISORDER): ICD-10-CM

## 2017-04-19 DIAGNOSIS — M35.2 BEHCET'S DISEASE (H): Primary | ICD-10-CM

## 2017-04-19 DIAGNOSIS — F95.9 TIC: ICD-10-CM

## 2017-04-19 DIAGNOSIS — Z91.030 HX OF BEE STING ALLERGY: ICD-10-CM

## 2017-04-19 DIAGNOSIS — M35.2 BEHCET'S DISEASE (H): ICD-10-CM

## 2017-04-19 DIAGNOSIS — K58.2 IRRITABLE BOWEL SYNDROME WITH BOTH CONSTIPATION AND DIARRHEA: ICD-10-CM

## 2017-04-19 DIAGNOSIS — K21.9 GASTROESOPHAGEAL REFLUX DISEASE, ESOPHAGITIS PRESENCE NOT SPECIFIED: ICD-10-CM

## 2017-04-19 DIAGNOSIS — E63.9 NUTRITIONAL DEFICIENCY: ICD-10-CM

## 2017-04-19 DIAGNOSIS — R07.89 ATYPICAL CHEST PAIN: ICD-10-CM

## 2017-04-19 DIAGNOSIS — F33.1 MODERATE EPISODE OF RECURRENT MAJOR DEPRESSIVE DISORDER (H): ICD-10-CM

## 2017-04-19 DIAGNOSIS — F43.10 PTSD (POST-TRAUMATIC STRESS DISORDER): Primary | ICD-10-CM

## 2017-04-19 DIAGNOSIS — G43.711 INTRACTABLE CHRONIC MIGRAINE WITHOUT AURA AND WITH STATUS MIGRAINOSUS: ICD-10-CM

## 2017-04-19 DIAGNOSIS — R52 PAIN: ICD-10-CM

## 2017-04-19 DIAGNOSIS — R11.2 NON-INTRACTABLE VOMITING WITH NAUSEA, UNSPECIFIED VOMITING TYPE: Primary | ICD-10-CM

## 2017-04-19 DIAGNOSIS — H04.123 DRY EYES: ICD-10-CM

## 2017-04-19 DIAGNOSIS — E73.9 LACTOSE INTOLERANCE: ICD-10-CM

## 2017-04-19 PROCEDURE — 99607 MTMS BY PHARM ADDL 15 MIN: CPT | Performed by: PHARMACIST

## 2017-04-19 PROCEDURE — 90834 PSYTX W PT 45 MINUTES: CPT | Performed by: SOCIAL WORKER

## 2017-04-19 PROCEDURE — 99605 MTMS BY PHARM NP 15 MIN: CPT | Performed by: PHARMACIST

## 2017-04-19 RX ORDER — EPINEPHRINE 0.3 MG/.3ML
0.3 INJECTION SUBCUTANEOUS PRN
Qty: 0.6 ML | Refills: 3 | Status: SHIPPED | OUTPATIENT
Start: 2017-04-19

## 2017-04-19 ASSESSMENT — ANXIETY QUESTIONNAIRES
5. BEING SO RESTLESS THAT IT IS HARD TO SIT STILL: NEARLY EVERY DAY
1. FEELING NERVOUS, ANXIOUS, OR ON EDGE: NEARLY EVERY DAY
6. BECOMING EASILY ANNOYED OR IRRITABLE: MORE THAN HALF THE DAYS
3. WORRYING TOO MUCH ABOUT DIFFERENT THINGS: MORE THAN HALF THE DAYS
7. FEELING AFRAID AS IF SOMETHING AWFUL MIGHT HAPPEN: MORE THAN HALF THE DAYS
2. NOT BEING ABLE TO STOP OR CONTROL WORRYING: NEARLY EVERY DAY
GAD7 TOTAL SCORE: 18

## 2017-04-19 ASSESSMENT — PATIENT HEALTH QUESTIONNAIRE - PHQ9: 5. POOR APPETITE OR OVEREATING: NEARLY EVERY DAY

## 2017-04-19 NOTE — PATIENT INSTRUCTIONS
Recommendations from today's MTM visit:                                                    MTM (medication therapy management) is a service provided by a clinical pharmacist designed to help you get the most of out of your medicines.     1. When you have a migraine, try doing one of the Excedrin migraine and one of the Tylenol (acetaminophen) to see if this helps with the migraine.     2. Call the insurance company to see if you can get more Zofran at a time.     3. I sent in a refill on Epipen.     4. Start taking the sucralfate four times a day. Ask the pharmacist for either some oral syringes with caps or the liquid bottles.     5. Try to get some Ensure from [employer] that you can tolerate.     Next MTM visit: As needed    To schedule another MTM appointment, please call the clinic directly or you may call the MTM scheduling line at [phone] or toll-free at [phone].     My Clinical Pharmacist's contact information:                                                      It was a pleasure seeing you today!  Please feel free to contact me with any questions or concerns you have.      Angela Nova, PharmD  Medication Therapy Management Pharmacist  Pager: [phone]    You may receive a survey about the MTM services you received.  I would appreciate your feedback to help me serve you better in the future. Please fill it out and return it when you can. Your comments will be anonymous.

## 2017-04-19 NOTE — PROGRESS NOTES
SUBJECTIVE/OBJECTIVE:                                                    Samara Oropeza is a 21 year old female coming in for an initial visit for Medication Therapy Management.  She was referred to me from Sonja Abreu.     Chief Complaint: General med review, polypharmacy.    Allergies/ADRs: Reviewed in Epic  Tobacco: No tobacco use   Alcohol: Less than 1 beverages / week  PMH: Reviewed in Epic    Medication Adherence: no issues reported    Vomiting/IBS/GERD: Throwing up blood since Thursday. Has been evaluated in the ER/hospital and was told to return if hematemesis persists. Currently taking Bentyl 20mg PRN,  hyocamine 0.125mg PRN, Linzess every other day, Culturelle daily, omeprazole 40mg daily, sucralfate 1g BID (prescribed as TID), ranitidine 150mg BID. Has had issues with ulcers in the past. Is taking ondansetron ODT 4mg PRN nausea as well. Also has Phergan and meclizine on-hand though these rarely works. For abdominal pain, is using Lorazepam - ER told her to take this ATC rather than PRN until vomiting stops. She thinks this is working well. Pt only able to get 18 ondansetron tablets at a fill.    Pain: Taking amitriptyline 50mg at bedtime. Pain a 7/10 today. Normal to low today. Doesn't want to take opioids. Sometimes will take APAP. PT 5 times - once a week. At the end of the session, uses decadron topically. Issues with her ankle - fell down the stairs. May need surgery.     Tourette's and Migraines: Currently taking Guanfacine 3 mg AM and 3PM, this was working well when she was in elementary school, however isn't working very well now. Also has Botox injections every 3 months for the migraines and tourettes, which she thinks are working well. Was having migraines every day, now only about 2/week. When she has a migraine, uses Excedrin migraine (with ASA, APAP and caffeine). Also has Imitrex on-hand for migraines, though doesn't take often.     Behcet's Disease: Currently taking Colcrys 1.2 in the  am and 0.6 in the PM, Otezla 15Qam and 30QPM. Rheumatologist wants to increase Otezla if possible, however she does have significant nausea and vomiting with it. Hoping to discontinue the Colcrys in the future. Has several creams and ointments on her list for derm/oral mucosa sxs - clobetasol, Magic mouthwash, triamcinolone cream). Doesn't have a specific reason other than place of symptom that she chooses one over the other.     Lactose intolerance: Currently taking Lactaid 2 every morning. Works well, no side effects.     Dry Eye: Uses Genteal every day for dry eye. Works well, no side effects.     Atypical Chest Pain: Occasionally uses albuterol for chest tightness, also takes ASA 81mg PRN (about 22-3 times per week). Working well, no side effects other than potential ulcerations    Allergies: Pt does have anaphylaxis to bee stings, and has been prescribed Epipens in the past. Does know that her current Epipen is .     Supplements: Rheumatologist wants her to take vitamin B6, vitamin B12, vitamin D, calcium, iron so she is taking those and wants to continue. Was also told to use Ensure, however she can't stand the vanilla taste.       Current labs include:  BP Readings from Last 3 Encounters:   04/15/17 105/67   17 124/85   17 106/73     Liver Function Studies -   Recent Labs   Lab Test  04/15/17   1322   PROTTOTAL  7.5   ALBUMIN  3.9   BILITOTAL  0.6   ALKPHOS  104   AST  35   ALT  56*       No results found for: UCRR, MICROL, UMALCR    Last Basic Metabolic Panel:  Lab Results   Component Value Date     04/15/2017      Lab Results   Component Value Date    POTASSIUM 3.8 04/15/2017     Lab Results   Component Value Date    CHLORIDE 104 04/15/2017     Lab Results   Component Value Date    BUN 9 04/15/2017     Lab Results   Component Value Date    CR 0.79 04/15/2017     GFR Estimate   Date Value Ref Range Status   04/15/2017 >90  Non  GFR Calc   >60 mL/min/1.7m2 Final    04/14/2017 >90  Non  GFR Calc   >60 mL/min/1.7m2 Final   01/13/2017 >90  Non  GFR Calc   >60 mL/min/1.7m2 Final     GFR Estimate If Black   Date Value Ref Range Status   04/15/2017 >90   GFR Calc   >60 mL/min/1.7m2 Final   04/14/2017 >90   GFR Calc   >60 mL/min/1.7m2 Final   01/13/2017 >90   GFR Calc   >60 mL/min/1.7m2 Final     TSH   Date Value Ref Range Status   11/26/2016 0.87 0.40 - 4.00 mU/L Final   ]    Most Recent Immunizations   Administered Date(s) Administered     DTAP (<7y) 10/12/1999     Hepatitis A Vac Ped/Adol-2 Dose 10/05/2012     Hepatitis B 11/07/1995     Human Papilloma Virus 04/14/2008     IPV 02/16/2000     Influenza (IIV3) 10/30/2015     MMR 02/16/2000     Meningococcal (Menactra ) 10/03/2007     TDAP Vaccine (Boostrix) 10/03/2007     Varicella 08/11/2009     ASSESSMENT:                                                       Current medications were reviewed today.     Medication Adherence: no issues identified    Vomiting/IBS/GERD: Needs improvement. Advised to follow ER's recommendations of using lorazepam and ondansetron ATC in the short term to avoid vomiting and potential worsening of the bleed. Also, should take sucralfate four times daily as prescribed to help heal any ulcerations in the stomach. See below for discussion of avoidance of ASA to help with GI bleed. May be able to get more ondansetron at a fill if we do a PA for them.    Pain: Plan in place, could potentially use more APAP to help with pain, however pt would rather not.     Tourette's and Migraines: Needs improvement. Consider decreasing Excedrin migraine to only 1 tablet and take with APAP to help avoid ASA use. Also could use Imitrex more, but pt would rather take Excedrin migraine with APAP.    Behcet's Disease: Plan in place with rheumatology.    Lactose intolerance: Stable.    Dry Eye: Stable.    Atypical Chest Pain: Trying to limit ASA,  likely that the ASA in Excedrin migraine is the only place where ASA could be minimized.     Allergies: Refill EpiPen so she has it in case of emergency.     Supplements: Continue current therapy. Especially as the pt hasn't been able to eat full meals, should start Ensure - many other flavor options other than Ensure.       PLAN:                                                      1. When you have a migraine, try doing one of the Excedrin migraine and one of the Tylenol (acetaminophen) to see if this helps with the migraine.   2. Call the insurance company to see if you can get more Zofran at a time.   3. I sent in a refill on Epipen.   4. Start taking the sucralfate four times a day. Ask the pharmacist for either some oral syringes with caps or the liquid bottles.   5. Try to get some Ensure from [employer] that you can tolerate.     I spent 75 minutes with this patient today.  All changes were made via collaborative practice agreement with Sonja Abreu. A copy of the visit note was provided to the patient's primary care provider.    Will follow up after PCP appointment.    The patient was given a summary of these recommendations as an after visit summary.     Angela Nova, PharmD  Medication Therapy Management Pharmacist  Pager: [phone]

## 2017-04-19 NOTE — PROGRESS NOTES
Progress Note    Client Name: Samara Oropeza  Date: 4/19/2017           Service Type: Individual      Session Start Time: 8:40  Session End Time: 9:30      Session Length: 45     Session #: 5     Attendees: Client attended alone    Treatment Plan Last Reviewed: 3/14/2017   PHQ-9 / TAHIR-7 : 4/19/2017      DATA      Progress Since Last Session (Related to Symptoms / Goals / Homework):   Symptoms: Worsening    Homework: Partially completed- does the mindfulness at times- has been complicated by health issues      Episode of Care Goals: Satisfactory progress - ACTION (Actively working towards change); Intervened by reinforcing change plan / affirming steps taken     Current / Ongoing Stressors and Concerns:   Client reports recent hospitalization- vomiting blood.  She was told that her lymph nodes were inflamed.   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 Processed through stressors with her pet, health, family stress.  Encouraged her to work on self-care to help with managing her mental health symptoms- focusing on eating, mindfulness, and getting support from her family.       Treatment Objective(s) Addressed in This Session:   use cognitive strategies identified in therapy to challenge anxious thoughts  Increase restful sleep     Intervention:   CBT: refocus on self-care        ASSESSMENT: Current Emotional / Mental Status (status of significant symptoms):   Risk status (Self / Other harm or suicidal ideation)   Client denies current fears or concerns for personal safety.   Client denies current or recent suicidal ideation or behaviors.   Client denies current or recent homicidal ideation or behaviors.   Client denies current or recent self injurious behavior or ideation.   Client denies other safety concerns.   A safety and risk management plan has not been developed at this time, however client was given the after-hours number / 911 should there be a change in  any of these risk factors.     Appearance:   Appropriate    Eye Contact:   Fair    Psychomotor Behavior: Normal    Attitude:   Cooperative    Orientation:   All   Speech    Rate / Production: Monotone     Volume:  Soft    Mood:    Anxious    Affect:    Flat    Thought Content:  Clear    Thought Form:  Coherent  Logical    Insight:    Fair      Medication Review:   No current psychiatric medications prescribed     Medication Compliance:   NA     Changes in Health Issues:   Yes: Pain, Associated Psychological Distress     Chemical Use Review:   Substance Use: Chemical use reviewed, no active concerns identified      Tobacco Use: No current tobacco use.       Collateral Reports Completed:   Not Applicable    PLAN: (Client Tasks / Therapist Tasks / Other)  Homework:      1.  Look into financial resources to help with her stress levels    2.  Keep using the mindfulness for sleep (13 minute Body Scan) from website:  [email or web address]/[email or web address]-meditations    3.  Focus on healthy eating small amount and more frequent versus one meal a day.            Layla Browne, LICSW                                                         ________________________________________________________________________    Treatment Plan    Client's Name: Samara Oropeza  YOB: 1995    Date: 3/14/2017     DSM-V Diagnoses: 296.32 Major Depressive Disorder, Recurrent Episode, Moderate _ or 300.02 (F41.1) Generalized Anxiety Disorder  Psychosocial / Contextual Factors: Moderate- Minimal contact with family, Health issues  WHODAS: 30    Referral / Collaboration:  Referral to another professional/service is not indicated at this time..    Anticipated number of session or this episode of care: 12      MeasurableTreatment Goal(s) related to diagnosis / functional impairment(s)  Goal 1: Client will decrease anxiety symptoms as eveidenced by self-report and TAHIR-7 scores, currently at 14, goal to 7 or below    I will know  I've met my goal when I am better able to deal with it.      Objective #A (Client Action)    Client will use relaxation strategies 3x times per day to reduce the physical symptoms of anxiety.  Status: New - Date: 3/14/17     Intervention(s)  Therapist will teach different relaxation strategies and have client complete one between session.    Objective #B  Client will use cognitive strategies identified in therapy to challenge anxious thoughts.  Status: New - Date: 3/14/17     Intervention(s)  Therapist will 1.  introduce cognitive distortions; 2. build awareness for client; 3. leanr an implement diffferent cognitive restructuring techniques.    Objective #C  Client will increase her amount of restful sleep.  Status: New - Date: 3/14/17     Intervention(s)  Therapist will 1. start a sleep journal; 2. Introduce techniques for falling and staying asleep.        Client has reviewed and agreed to the above plan.      Layla Browne, [facility] Medical CenterSW  March 14, 2017

## 2017-04-19 NOTE — MR AVS SNAPSHOT
After Visit Summary   4/19/2017    Samara Oropeza    MRN: [id number]           Patient Information     Date Of Birth          1995        Visit Information        Provider Department      4/19/2017 2:30 PM Angela Nova [facility] MTM        Today's Diagnoses     Hx of bee sting allergy          Care Instructions    Recommendations from today's MTM visit:                                                    MTM (medication therapy management) is a service provided by a clinical pharmacist designed to help you get the most of out of your medicines.     1. When you have a migraine, try doing one of the Excedrin migraine and one of the Tylenol (acetaminophen) to see if this helps with the migraine.     2. Call the insurance company to see if you can get more Zofran at a time.     3. I sent in a refill on Epipen.     4. Start taking the sucralfate four times a day. Ask the pharmacist for either some oral syringes with caps or the liquid bottles.     5. Try to get some Ensure from [employer] that you can tolerate.     Next MTM visit: As needed    To schedule another MTM appointment, please call the clinic directly or you may call the MTM scheduling line at [phone] or toll-free at [phone].     My Clinical Pharmacist's contact information:                                                      It was a pleasure seeing you today!  Please feel free to contact me with any questions or concerns you have.      Angela Nova, PharmD  Medication Therapy Management Pharmacist  Pager: [phone]    You may receive a survey about the MTM services you received.  I would appreciate your feedback to help me serve you better in the future. Please fill it out and return it when you can. Your comments will be anonymous.                  Follow-ups after your visit        Your next 10 appointments already scheduled     Apr 21, 2017 11:00 AM CDT   (Arrive by 10:45  AM)   Return Visit with EVI Vizcaino CNP   [facility] Gastroenterology and IBD ([facility] [place])    [address]  4th [facility] [id number] [phone]            Apr 24, 2017 11:30 AM CDT   [facility]    [address] [id number] [phone]            Apr 25, 2017 11:00 AM CDT   Return Visit with Andres Johnson DPM   [facility] [place] [facility] ([facility])    [address]  [address]  [facility] [address]   [phone]            May 02, 2017 11:30 AM CDT   Return Visit with Layla Brow[facility] ([facility])    [address] [id number] [phone]            May 09, 2017  9:30 AM CDT   Return Visit with Layla Browne [facility] ([facility])    [address] [id number] [phone]            May 16, 2017  1:30 PM CDT   Return Visit with Layla Browne [facility] ([facility])    [address] [id number] [phone]            Jun 22, 2017  1:50 PM CDT   (Arrive by 1:35 PM)   Return Botox with Frederick Bender MD   [facility] Physical Medicine and Rehabilitation ([facility] [place])    [address]  3rd [facility] [id number] [phone]              Who to contact     If you have questions or need follow up information about today's clinic visit or your schedule please contact [facility] PRIMARY CARE MTM directly at [phone].  Normal or non-critical lab and imaging results will be communicated to you by MyChart, letter or phone within 4 business days  after the clinic has received the results. If you do not hear from us within 7 days, please contact the clinic through [employer] or phone. If you have a critical or abnormal lab result, we will notify you by phone as soon as possible.  Submit refill requests through [employer] or call your pharmacy and they will forward the refill request to us. Please allow 3 business days for your refill to be completed.          Additional Information About Your Visit        [employer] Information     [employer] gives you secure access to your electronic health record. If you see a primary care provider, you can also send messages to your care team and make appointments. If you have questions, please call your primary care clinic.  If you do not have a primary care provider, please call [phone] and they will assist you.        Care EveryWhere ID     This is your Care EveryWhere ID. This could be used by other organizations to access your [place] medical records  [id number]        Your Vitals Were     Last Period                   04/07/2017            Blood Pressure from Last 3 Encounters:   04/15/17 105/67   04/14/17 124/85   03/31/17 106/73    Weight from Last 3 Encounters:   04/15/17 152 lb 1.9 oz (69 kg)   04/14/17 150 lb (68 kg)   03/28/17 153 lb (69.4 kg)              Today, you had the following     No orders found for display       Primary Care Provider Office Phone # Fax #    Sonja EVI Laguerre [phone]       [facility] [address] [facility] [address]  [facility] [address]        Thank you!     Thank you for choosing [facility] PRIMARY CARE [facility]  for your care. Our goal is always to provide you with excellent care. Hearing back from our patients is one way we can continue to improve our services. Please take a few minutes to complete the written survey that you may receive in the mail after your visit with us. Thank you!             Your Updated Medication List - Protect others  around you: Learn how to safely use, store and throw away your medicines at www.disposemymeds.org.          This list is accurate as of: 4/19/17  4:15 PM.  Always use your most recent med list.                   Brand Name Dispense Instructions for use    albuterol 108 (90 BASE) MCG/ACT Inhaler    PROAIR HFA/PROVENTIL HFA/VENTOLIN HFA    1 Inhaler    Inhale 2 puffs into the lungs every 6 hours as needed for shortness of breath / dyspnea or wheezing       amitriptyline 25 MG tablet    ELAVIL    45 tablet    Take 1 tablet (25 mg) by mouth At Bedtime May increase to 2, if no benefit after 2 weeks.  Feel free to call / page the nurse for Dr. Mcmahon at [phone] / [phone] with questions regarding this order.       * Apremilast 10 & 20 & 30 MG Tbpk    OTEZLA    27 tablet    Take one tablet in the morning on day 1, then take one tablet every 12 hours on days 2 thru 14, according to the instructions on the packet.       * apremilast 30 MG tablet    OTEZLA    60 tablet    20 mg in AM and 30mg in PM daily X 2 weeks and then 30mg twice daily.       betamethasone valerate 0.1 % cream    VALISONE     Apply topically 2 times daily       * botulinum toxin type A 100 UNITS injection    BOTOX    200 Units    Inject 200 Units into the muscle every 3 months       * BOTOX IJ      Inject 150 Units into the muscle once Lot: /C3 Exp: 05/2019       * BOTOX IJ      Inject 150 Units into the muscle Lot # /C3  Exp: 8/2019       * BOTOX IJ      Inject 162.5 Units as directed once Lot #: /C3 Exp: 10/2019       carbamide peroxide 6.5 % otic solution    DEBROX     5 drops 2 times daily       clobetasol 0.05 % cream    TEMOVATE    60 g    Apply topically 2 times daily       Colchicine 0.6 MG Caps     90 capsule    Take 0.6 mg by mouth See Admin Instructions 2 capsules in AM and 1 capsule in PM       dexamethasone 4 MG/ML injection    DECADRON    30 mL    Apply 1 mL (4 mg) topically as needed       * dicyclomine 20 MG tablet     BENTYL    90 tablet    Take 1 tablet (20 mg) by mouth every 6 hours       * dicyclomine 10 MG capsule    BENTYL    120 capsule    Take 1-2 capsules (10-20 mg) by mouth 2 times daily as needed       EPINEPHrine 0.3 MG/0.3ML injection    EPIPEN    2 each    Inject 0.3 mLs into the muscle once as needed for anaphylaxis for 1 dose. And call 911.       EXCEDRIN MIGRAINE PO      Take by mouth as needed       guanFACINE 1 MG tablet    TENEX    180 tablet    3 tablets in the morning and 3 tablets in the evening       hyoscyamine 0.125 MG tablet    ANASPAZ/LEVSIN    40 tablet    Take 1-2 tablets (125-250 mcg) by mouth every 4 hours as needed for cramping       hypromellose 0.3 % Soln ophthalmic solution    GENTEAL     1 drop every hour as needed       LACTAID PO      Take by mouth daily       lidocaine 2 % topical gel    XYLOCAINE     Apply 1 Tube topically daily       lidocaine visc 2% 2.5mL/5mL & maalox/mylanta w/ simeth 2.5mL/5mL & diphenhydrAMINE 5mg/5mL Susp suspension    [facility]    1 Bottle    Swish and swallow 10 mLs in mouth every 6 hours as needed for mouth sores       linaclotide 290 MCG capsule    LINZESS    30 capsule    Take 1 capsule (290 mcg) by mouth every morning (before breakfast)       * LORazepam 1 MG tablet    ATIVAN    90 tablet    Take 1-2 tablets (1-2 mg) by mouth every 8 hours as needed for other (abdominal pain) Do not operate a vehicle after taking this medication       * LORazepam 1 MG tablet    ATIVAN    90 tablet    Take 1 tablet (1 mg) by mouth every 8 hours as needed for anxiety       lubiprostone 24 MCG capsule    AMITIZA    60 capsule    Take 1 capsule (24 mcg) by mouth 2 times daily (with meals)       meclizine 25 MG tablet    ANTIVERT    30 tablet    Take 1 tablet (25 mg) by mouth every 6 hours as needed for dizziness       norgestimate-ethinyl estradiol 0.25-35 MG-MCG per tablet    ORTHO-CYCLEN, SPRINTEC    84 tablet    Take 1 tablet by mouth daily       omeprazole 40  MG capsule    priLOSEC    30 capsule    Take 1 capsule (40 mg) by mouth daily       ondansetron 4 MG ODT tab    ZOFRAN-ODT    20 tablet    Take 1 tablet (4 mg) by mouth every 6 hours as needed for nausea       promethazine 25 MG tablet    PHENERGAN    20 tablet    Take 0.5-1 tablets (12.5-25 mg) by mouth every 6 hours as needed for nausea       RANITIDINE 75 PO      Take  by mouth. As needed for GI upset       sucralfate 1 GM/10ML suspension    CARAFATE    1200 mL    Take 10 mLs (1 g) by mouth 4 times daily       SUMAtriptan 100 MG tablet    IMITREX    18 tablet    Take 1 tablet (100 mg) by mouth at onset of headache for migraine May repeat in 2 hours. Max 2 tablets/24 hours.       triamcinolone 0.1 % cream    KENALOG    15 g    Apply sparingly to oral ulcers three times daily for 14 days as needed.       UNABLE TO FIND      daily MEDICATION NAME: Peppermint supplement       VITAMIN B6 PO          * Notice:  This list has 10 medication(s) that are the same as other medications prescribed for you. Read the directions carefully, and ask your doctor or other care provider to review them with you.

## 2017-04-19 NOTE — TELEPHONE ENCOUNTER
Called and updated pt that prescription has been sent to the pharmacy. Pt will call the pharmacy to make sure that the medication will be sent to her.    Lori Miner RN  Rheumatology Clinic

## 2017-04-19 NOTE — MR AVS SNAPSHOT
MRN:[id number]                      After Visit Summary   4/19/2017    Samara Oropeza    MRN: [id number]           Visit Information        Provider Department      4/19/2017 8:30 AM Layla Browne [facility] Generic      Your next 10 appointments already scheduled     Apr 19, 2017  2:30 PM CDT   Office Visit with Angela Nova   [facility] ([facility])    [address] [place] [address]  [facility] [phone]           Bring a current list of meds and any records pertaining to this visit.  For Physicals, please bring immunization records and any forms needing to be filled out.  Please arrive 10 minutes early to complete paperwork.            Apr 21, 2017 11:00 AM CDT   (Arrive by 10:45 AM)   Return Visit with EVI Vizcaino [facility] Gastroenterology and IBD ([facility])    [address]  4th [facility] [phone] [phone]            Apr 24, 2017 11:30 AM CDT   Return Visit with ALISA Burt   [facility] ([facility])    [address] [phone]            Apr 25, 2017 11:00 AM CDT   Return Visit with Andres Johnson DPM   [facility] [place] [facility] ([facility] [place])    [address]  [address]  [facility] [address]   [phone]            May 02, 2017 11:30 AM CDT   Return Visit with ALISA Burt   [facility] ([facility])    [address] [phone] [phone]            May 09, 2017  9:30 AM CDT   Return Visit with ALISA Burt   [facility] ([facility])    [address]  [address]  [facility] [phone]            May 16, 2017  1:30 PM CDT   Return Visit with ALISA Burt   [facility] ([facility])    [address] [place] [address] [facility] [phone] [phone]            Jun 22, 2017  1:50 PM CDT   (Arrive by 1:35 PM)   Return Botox with Frederick Bender MD   [facility] Physical Medicine and Rehabilitation ([facility] [place])    [address] [address] [phone]              [employer] Information     [employer] gives you secure access to your electronic health record. If you see a primary care provider, you can also send messages to your care team and make appointments. If you have questions, please call your primary care clinic.  If you do not have a primary care provider, please call [phone] and they will assist you.        Care EveryWhere ID     This is your Care EveryWhere ID. This could be used by other organizations to access your [place] medical records  [id number]

## 2017-04-19 NOTE — TELEPHONE ENCOUNTER
[facility] pharmacy asking if Otezla 30 mg sig can be changed to 30 mg BID. Saying sig currently not standard for maintenance dose. Please advise at [phone]. DVM fine. Sent to [employer].

## 2017-04-20 ASSESSMENT — ANXIETY QUESTIONNAIRES: GAD7 TOTAL SCORE: 18

## 2017-04-20 ASSESSMENT — PATIENT HEALTH QUESTIONNAIRE - PHQ9: SUM OF ALL RESPONSES TO PHQ QUESTIONS 1-9: 22

## 2017-04-21 ENCOUNTER — TELEPHONE (OUTPATIENT)
Dept: GASTROENTEROLOGY | Facility: CLINIC | Age: 22
End: 2017-04-21

## 2017-04-21 ENCOUNTER — OFFICE VISIT (OUTPATIENT)
Dept: GASTROENTEROLOGY | Facility: CLINIC | Age: 22
End: 2017-04-21

## 2017-04-21 VITALS
DIASTOLIC BLOOD PRESSURE: 66 MMHG | HEIGHT: 63 IN | HEART RATE: 99 BPM | OXYGEN SATURATION: 99 % | WEIGHT: 150 LBS | BODY MASS INDEX: 26.58 KG/M2 | SYSTOLIC BLOOD PRESSURE: 111 MMHG

## 2017-04-21 DIAGNOSIS — R10.9 ABDOMINAL CRAMPING: ICD-10-CM

## 2017-04-21 DIAGNOSIS — K21.9 GASTROESOPHAGEAL REFLUX DISEASE, ESOPHAGITIS PRESENCE NOT SPECIFIED: ICD-10-CM

## 2017-04-21 DIAGNOSIS — G47.00 INSOMNIA, UNSPECIFIED TYPE: ICD-10-CM

## 2017-04-21 DIAGNOSIS — E55.9 VITAMIN D DEFICIENCY: ICD-10-CM

## 2017-04-21 DIAGNOSIS — R19.7 DIARRHEA, UNSPECIFIED TYPE: ICD-10-CM

## 2017-04-21 DIAGNOSIS — K58.2 IRRITABLE BOWEL SYNDROME WITH BOTH CONSTIPATION AND DIARRHEA: ICD-10-CM

## 2017-04-21 DIAGNOSIS — K92.0 HEMATEMESIS, PRESENCE OF NAUSEA NOT SPECIFIED: ICD-10-CM

## 2017-04-21 DIAGNOSIS — R07.89 ATYPICAL CHEST PAIN: Primary | ICD-10-CM

## 2017-04-21 DIAGNOSIS — R11.2 NON-INTRACTABLE VOMITING WITH NAUSEA, UNSPECIFIED VOMITING TYPE: ICD-10-CM

## 2017-04-21 LAB
BASOPHILS # BLD AUTO: 0 10E9/L (ref 0–0.2)
BASOPHILS NFR BLD AUTO: 0.2 %
CRP SERPL-MCNC: <2.9 MG/L (ref 0–8)
DIFFERENTIAL METHOD BLD: NORMAL
EOSINOPHIL # BLD AUTO: 0.1 10E9/L (ref 0–0.7)
EOSINOPHIL NFR BLD AUTO: 3.3 %
ERYTHROCYTE [DISTWIDTH] IN BLOOD BY AUTOMATED COUNT: 12.9 % (ref 10–15)
HCT VFR BLD AUTO: 39.3 % (ref 35–47)
HGB BLD-MCNC: 12.8 G/DL (ref 11.7–15.7)
IMM GRANULOCYTES # BLD: 0 10E9/L (ref 0–0.4)
IMM GRANULOCYTES NFR BLD: 0.2 %
LYMPHOCYTES # BLD AUTO: 1.9 10E9/L (ref 0.8–5.3)
LYMPHOCYTES NFR BLD AUTO: 45 %
MCH RBC QN AUTO: 29.6 PG (ref 26.5–33)
MCHC RBC AUTO-ENTMCNC: 32.6 G/DL (ref 31.5–36.5)
MCV RBC AUTO: 91 FL (ref 78–100)
MONOCYTES # BLD AUTO: 0.3 10E9/L (ref 0–1.3)
MONOCYTES NFR BLD AUTO: 5.8 %
NEUTROPHILS # BLD AUTO: 2 10E9/L (ref 1.6–8.3)
NEUTROPHILS NFR BLD AUTO: 45.5 %
NRBC # BLD AUTO: 0 10*3/UL
NRBC BLD AUTO-RTO: 0 /100
PLATELET # BLD AUTO: 231 10E9/L (ref 150–450)
RBC # BLD AUTO: 4.32 10E12/L (ref 3.8–5.2)
WBC # BLD AUTO: 4.3 10E9/L (ref 4–11)

## 2017-04-21 RX ORDER — OMEPRAZOLE 40 MG/1
40 CAPSULE, DELAYED RELEASE ORAL DAILY
Qty: 90 CAPSULE | Refills: 3 | Status: SHIPPED | OUTPATIENT
Start: 2017-04-21 | End: 2018-07-06

## 2017-04-21 RX ORDER — LACTOBACILLUS RHAMNOSUS GG 10B CELL
CAPSULE ORAL
COMMUNITY
Start: 2017-01-27 | End: 2017-05-17

## 2017-04-21 RX ORDER — ASPIRIN 81 MG/1
81 TABLET, CHEWABLE ORAL PRN
COMMUNITY
End: 2019-12-31

## 2017-04-21 RX ORDER — ONDANSETRON 8 MG/1
8 TABLET, ORALLY DISINTEGRATING ORAL EVERY 8 HOURS PRN
Qty: 60 TABLET | Refills: 11 | Status: SHIPPED | OUTPATIENT
Start: 2017-04-21 | End: 2017-04-24

## 2017-04-21 RX ORDER — DICYCLOMINE HCL 20 MG
20 TABLET ORAL 4 TIMES DAILY PRN
Qty: 60 TABLET | Refills: 1 | Status: SHIPPED | OUTPATIENT
Start: 2017-04-21 | End: 2018-01-10

## 2017-04-21 RX ORDER — CHOLECALCIFEROL (VITAMIN D3) 50 MCG
2000 TABLET ORAL DAILY
Qty: 100 TABLET | Refills: 3 | Status: SHIPPED | OUTPATIENT
Start: 2017-04-21 | End: 2017-05-17

## 2017-04-21 ASSESSMENT — ENCOUNTER SYMPTOMS
EXERCISE INTOLERANCE: 1
DECREASED CONCENTRATION: 1
ABDOMINAL PAIN: 1
SWOLLEN GLANDS: 1
HEARTBURN: 1
NIGHT SWEATS: 1
PARALYSIS: 0
EYE REDNESS: 0
BOWEL INCONTINENCE: 0
SINUS PAIN: 1
DECREASED APPETITE: 1
TASTE DISTURBANCE: 1
SYNCOPE: 1
MUSCLE CRAMPS: 1
RESPIRATORY PAIN: 1
HYPERTENSION: 0
DIZZINESS: 1
JOINT SWELLING: 1
VOMITING: 1
PANIC: 1
POSTURAL DYSPNEA: 1
MEMORY LOSS: 1
SINUS CONGESTION: 1
SMELL DISTURBANCE: 1
MUSCLE WEAKNESS: 1
SNORES LOUDLY: 0
HYPOTENSION: 1
CHILLS: 1
NUMBNESS: 1
ORTHOPNEA: 1
POLYPHAGIA: 0
SEIZURES: 0
CLAUDICATION: 1
TREMORS: 0
BLOATING: 1
MYALGIAS: 1
EYE IRRITATION: 1
DYSPNEA ON EXERTION: 1
TINGLING: 1
LEG SWELLING: 1
LEG PAIN: 1
WEIGHT GAIN: 0
DIARRHEA: 1
WHEEZING: 0
EYE PAIN: 1
RECTAL PAIN: 0
SHORTNESS OF BREATH: 1
JAUNDICE: 0
HOARSE VOICE: 1
HEMOPTYSIS: 1
NAIL CHANGES: 1
ALTERED TEMPERATURE REGULATION: 1
STIFFNESS: 1
HEADACHES: 1
BLOOD IN STOOL: 1
SLEEP DISTURBANCES DUE TO BREATHING: 1
RECTAL BLEEDING: 0
POLYDIPSIA: 1
SPEECH CHANGE: 1
LIGHT-HEADEDNESS: 1
DEPRESSION: 1
POOR WOUND HEALING: 1
NECK PAIN: 1
DOUBLE VISION: 1
EYE WATERING: 1
ARTHRALGIAS: 1
NECK MASS: 0
COUGH: 1
DISTURBANCES IN COORDINATION: 1
WEAKNESS: 1
CONSTIPATION: 1
TROUBLE SWALLOWING: 1
INCREASED ENERGY: 1
TACHYCARDIA: 1
BRUISES/BLEEDS EASILY: 1
FEVER: 1
SORE THROAT: 1
SPUTUM PRODUCTION: 0
SKIN CHANGES: 0
INSOMNIA: 1
NAUSEA: 1
HALLUCINATIONS: 0
BACK PAIN: 1
COUGH DISTURBING SLEEP: 0
WEIGHT LOSS: 1
NERVOUS/ANXIOUS: 1
LOSS OF CONSCIOUSNESS: 1
FATIGUE: 1

## 2017-04-21 ASSESSMENT — PAIN SCALES - GENERAL: PAINLEVEL: SEVERE PAIN (6)

## 2017-04-21 NOTE — NURSING NOTE
"Chief Complaint   Patient presents with     RECHECK      RUQ abd pain nausea and vomitiing     [facility] F/U       Vitals:    04/21/17 1051   BP: 111/66   Pulse: 99   SpO2: 99%   Weight: 68 kg (150 lb)   Height: 1.6 m (5' 3\")       Body mass index is 26.57 kg/(m^2).                            "

## 2017-04-21 NOTE — TELEPHONE ENCOUNTER
Called and gave pt the message that if she is not able to get the starter pack she should call us. Dr. Stevenson did write for it for starter pack and and 30 mg tabs. Pt is wondering if she can continue to take 15 mg BID. Discussed that she should not be taking 15 mg tablets as splitting them could be causing increased gastric distress. Pt stated she was recently discharged from hospital due to vomiting. Discussed that the starter pack has been written for and per Dr. Stevenson she can do that. Above are the orders that Dr. Silva is ok with her taking 30 mg BID. Pt is currently taking 15 mg in the am and 30 mg at night. Will call pt back to tomorrow after I hear from the pharmacy, to see about the starter pack, so that pt can take pills without cutting.      Wilkinson from pharmacy, see message below.  Will need to route to provider to see what to do next.       Lori Miner RN  Rheumatology Clinic

## 2017-04-21 NOTE — TELEPHONE ENCOUNTER
Ondansetron 8 mg dissolvable two times daily as needed prescription sent to [facility]  Now called insurance [facility]    [place]  [phone]

## 2017-04-21 NOTE — TELEPHONE ENCOUNTER
----- Message from Mika Redman RN sent at 4/21/2017  4:07 PM CDT -----  Regarding: FW: dose adjustment Otezla      ----- Message -----     From: Hallie Reyes [facility]     Sent: 4/21/2017  12:23 PM       To: Mika Redman RN  Subject: dose adjustment Otezla                           Phu Sandoval said the desired Otezla  treatment would be two weeks of 20mg am, 30mg pm.     Because each Otezla starter pack has 4 tablets 10mg, 4 tablets 20mg, and 47 tablets 30mg; we tried to bill insurance for TWO packs as a 53-day supply. This would give Courtney 12 days of 20mg in the morning and 30mg in the evening, plus 41 more days of 30mg bid.    Insurance denied.  It will require a Prior Approval from insurance for us to send pt more than one pack.

## 2017-04-21 NOTE — TELEPHONE ENCOUNTER
Called and spoke with pharmacy. They will run through the medication and make sure that the pt can get it with her insurance. Explained to pharmacy why the pt needs the starter pack.    Lori Miner RN  Rheumatology Clinic

## 2017-04-21 NOTE — LETTER
"    April 21, 2017       TO: Samara Oropeza  [address]  [place] [address]         Dear Samara,    I have received the updated results from the esophageal motility and pH studies completed 2/15/2017.    The motility study was essentially normal. The upper and lower sphincters contract and relax as they should. Swallows are brought down the esophagus in a normal manner.  There were some contractions that were stronger than usual, but they were coordinated contractions, not showing \"esophageal spasm\" of simultaneous contractions. So these also are considered normal variations. I wish to discuss these further.    The study regarding acid reflux showed only normal reflux. The final report is here:  Interpretation / Findings   Gastric Acid Control: 66.5% of study pH less than 4   Esophageal Acid Exposure: normal esophageal acid exposure, DeMeester score of 9.2 with normal less than 14.7   Total Number Reflux Episodes: 10total, 3acid, 7weakly acidic and 0 nonacid   Symptom Association: no clear symptom association     Conclusion   Negative pH impedance study for reflux, with no clear symptom association.       Sincerely,    Kacie Almeida    0.68 mg (actual weight).CYNTHIA Vizcaino Gastroenterology  Coordinator  [phone] [place] or call -  GI Nurse Triage  [phone] for Medical Questions, # 3    "

## 2017-04-21 NOTE — LETTER
4/21/2017       RE: Samara Oropeza  [address]  [place] [address]     Dear Colleague,    Thank you for referring your patient, Samara Oropeza, to the [facility] GASTR[facility]OLOG[facility] AND IBD at [facility]. Please see a copy of my visit note below.    CHIEF COMPLAINT:  Followup regarding gastroparesis, atypical chest pain, esophageal studies, recent symptoms.      HISTORY OF PRESENT ILLNESS:  Samara is a 21-year-old woman with Behcet's syndrome, polyarthralgia, Raynaud phenomenon and other conditions who has been evaluated at [facility], as well as in consultation with Dr. Vitaliy viveros and Dr. Samuel viveros in 02/2016 regarding the question of possible Crohn's disease.  She does not have Crohn's disease.  Nonetheless, she has a number of GI symptoms not yet smoothly managed. Last visit was in January.     Samara has an atypical chest pain occurring for no known cause, also not when she is eating or swallowing.  Last upper GI endoscopy was 06/2014 by [facility] for dysphagia, gas and bloat.  The mucosa was visually normal.  Biopsies of mid esophagus, stomach, duodenum and also random colon were all normal at that time.  Previous upper GI endoscopy 2005 for dysphagia had also been normal.  At this time, we had her do an esophageal motility and pH testing, completed in February.      Samara typically has alternating diarrhea and constipation and learned that linaclotide about every other day was good management for constipation; however, she has now been in the diarrhea cycle with 2-3 loose stools daily for well over a week.  On 04/12 through 04/13, she had a sudden increase to 13 stools daily.  She then also developed an increase in her vomiting and had a half hour steady vomiting.  At the end of that, she had much blood on the tissue and saw black stool for a couple of days.  She has returned to having loose to watery stool 2-3  times a day with no black but rather normal color.      Samara continues to have abdominal pain which increases and decreases and is sometimes clearly cramping, other times not.  It often wraps around the mid to upper abdomen towards the back on both sides.  She received dicyclomine 10 mg to use 1-2 at time and has no benefit from 1, has not yet used 2 at a time.  In her youth, she used that, as well as hyoscyamine.  She tried hyoscyamine a week ago during her intense symptoms and had no benefit.      Samara returned to use of amitriptyline, first 25 and then 50 mg after the December or January visit.  This does help her to sleep but does not seem to have much impact on her GI symptoms of nausea and vomiting generally. She no longer has the chest pain most days of the week; possibly an improvement secondary to the amitriptyline.     Samara is undergoing trials of medication with very gradual dose escalation due to GI intolerance, Otezla.  She remains at 15 mg in the morning and 30 mg in the afternoon.      Samara has had long-standing nausea with vomiting usually aborted with ondansetron.  She will usually take 2 at a time, sometimes 1.  She is only given 18 per month of the 4 mg dose so tolerates a lot of vomiting for lack of medication.  The vomiting usually last 10-15 minutes rather than a half hour of last week.      Samara had been prescribed sucralfate for bilious vomiting and used it regularly for a while and stopped it. She says, it helped. She reports having recently restarted it and also not yet having had to refill her bottle.  This suggests a very low level use.      MEDICAL HISTORY:  Positive for Behcet's; Tourette's; migraines; GERD; arthritis, possibly rheumatoid arthritis; anxiety and PTSD and others.      SURGICAL HISTORY:  Knee surgery 2013, dental surgery in toddler years, endoscopies.     Medications: reviewed and discussed.    Adverse reactions: reviewed and discussed. We  "cannot give metoclopramide due to Tourette's (and migraine).     FAMILY HISTORY:  Apparently negative for autoimmune disease.      SOCIAL HISTORY:  Single, lives with mother.  She is a never smoker with rare alcohol, no street drugs.      REVIEW OF SYSTEMS:  She has had weight loss since last fall of 13-16 pounds.  This is associated with loss of appetite, primarily.  She continues to have numerous other broad symptoms including night sweats, chills, sense of stress.  She has thirst, but no hunger.  She denies rash at this time but has pruritus.  She has eye pain but has not had finding of uveitis, retinitis or other findings with the direct ophthalmologic review.  I note that her chest pain comes out of nowhere, not associated with ingestions, often when seated.  She reports abdominal pain being so bad that she cannot take a deep breath at times.  She also has chest pain on exertion and awaits a Cardiology referral, expecting to receive this when she sees her primary provider on Monday.      Samara denies any dysphagia above what she has had for many years which has been scoped and tested otherwise.  She has no chest pain with ingestions.  She has questions about when she passes out, though reports that it does not occur immediately after ingestions nor in response to her abdominal or chest pain. She has had sense of rapid heart rate, and it has frequently been very rapid.  She asks whether rapid heart rate would cause her to have fainting.      OBJECTIVE:   VITAL SIGNS:  Reviewed.  They are stable in blood pressure and heart rate and oxygen.  Weight 150 pounds/68 kg 4.  Height is 63 inches/1.6 meters and BMI 26.57.  Weight 11/16/2016 what was 29.4 BMI.  Pain today 6/10 \"everywhere.\"   GENERAL:  Clean, neatly groomed, pleasant woman who shows her distress only minimally with a serious expression through the eyes.  She answers questions herself through the interview, though her mother had answered for her when " the nurse checked her in.   EYES:  Clear.   RESPIRATORY:  Breathing is grossly normal.   ABDOMEN:  Nontender.      RESULTS:  [facility] with no contrast with a 3.6 cm left adnexal cyst, mild prominence of several mesenteric and upper retroperitoneal lymph nodes which is nonspecific, questionable mural stratification of the ascending colon.  No other findings.  No acute findings to explain her symptoms.  Mild nonspecific findings of the prominence of lymph nodes and questionable mural stratification of the ascending colon with no contrast.     Esophageal motility essentially normal with normal UES (upper esophageal sphincter) and LES contractions and relaxations.  Body of the esophagus had coordinated movements with no failed swallows or extraneous contractions.  She had some slightly bold above usual pressures in the distal esophagus, but they remained coordinated.  She reports that she did not have any chest pain during this study showing the mildly intense contractions.      Her pH study with impedance was entirely normal with DeMeester score of 9.2 with normal less than 14.7.  Note that most of the 10 reflux episodes where weakly acid (more likely post prandial) and occurred daytime.  There was no clear symptom association.           ASSESSMENT:  A 21-year-old woman with multiple gastrointestinal complaints over time which were abruptly worse 04/12 through 04/16.  She vomited for a half hour, does not know the color of her vomitus but had much blood on the tissue at the end.  In all likelihood, she had Sandra-Tamayo tear.  She did see blood in stools - melena, so it may have been a significant loss.  The visible blood in stool has stopped, and she has returned to one of her normal patterns of stool, 2-3 loose stools daily.      Samara continues to have her intermittent chest pain a few times a week, possibly slightly less often rather than having it most  days since restart of amitriptyline, and she is unable to associated with any ingestion or activity.  See results.  We did not find any cause for her chest pain in the esophageal manometry or pH impedance studies.      Samara had reported feeling better with her nausea and vomiting using sucralfate more frequently and has returned to using this.  She had a break from it. I have not yet found a person to have an overdose of aluminum with regular sucralfate use, and her kidney function is good.  With any ongoing vomiting, we wish her to continue the omeprazole to prevent esophageal damage and possibly decrease both the reflux, vomiting and bloat.  With her tendency toward constipation, she will return to the linaclotide on an every other day schedule and adjusted it as needed.  With the cramping abdominal pain which sometimes causes a very firm bloat, we encouraged her now to try dicyclomine 20 mg at a time.  It does typically occur shortly after meals, so we encouraged her to think of taking the dicyclomine well ahead of the meal.        FURTHER PLAN:   1.  As in assessment.    2.  If she continues to have diarrhea over the next 2 weeks, we asked her to do stool studies for C. diff and enteric bacteria and virus.    3.  Complete blood count with platelets and differential and CRP due to recent episode of what appears to have been an acute illness.   4.  Dicyclomine 20 mg now sent.   5.  Ondansetron 8 mg dissolvable tablet.  We wish this to be available for her to have up to twice daily.  She is now tolerating vomiting several days a week because she does not have adequate ondansetron, as she is tolerating far more nausea also due to lack of ondansetron.  This order has been sent, and prior authorization has been initiated.   6.  Amitriptyline is refilled.   7.  Vitamin D 2000 units of cholecalciferol should be continued daily and is sent again.   8.  Upper GI endoscopy (EGD) remains a consideration. She prefers  no extra study.        We discussed the assessment and plan, provided results in writing and discussed what to followup interval she would prefer.  She is to call if she has any increase in symptoms. She is to go to ER if she has recurrent blood in vomitus. I am happy to work over the telephone to any extent, but I need to see her occasionally.  I suggested every 3-6 months, though she suggested she may wish to come in 2 months.      Total visit 35 minutes with counseling time 25 minutes.         EVI DE LA FUENTE CNP         D: 2017 18:38   T: 2017 14:29   MT: LEIA      Name:     LUCINA BAH   MRN:      [id number]        Account:      [id number]   :      1995           Service Date: 2017      Document: [id number]

## 2017-04-21 NOTE — MR AVS SNAPSHOT
After Visit Summary   4/21/2017    Samara Oropeza    MRN: [id number]           Patient Information     Date Of Birth          1995        Visit Information        Provider Department      4/21/2017 11:00 AM Kacie Almeida APRN CNP [facility]        Today's Diagnoses     Abdominal cramping    -  1    Irritable bowel syndrome with both constipation and diarrhea        Non-intractable vomiting with nausea, unspecified vomiting type        Hematemesis, presence of nausea not specified        Atypical chest pain        Insomnia, unspecified type        Gastroesophageal reflux disease, esophagitis presence not specified        Vitamin D deficiency        Diarrhea, unspecified type          Care Instructions    Continue the omeprazole (Prilosec)   Continue the sucralfate Carafate   A portion of your nausea may be related to bile in the stomach.  Sucralfate binds the bile.  Take this 3-4 times daily   Always at bedtime.   Between meals, especially on an empty stomach.   Two hours after a meal, an hour or more before a meal.    It sounds like you had a kavon blankenship tear that caused the blood.    Call if red or black or coffee ground appearance.    If still diarrhea in two more weeks, then stool studies for infection.    Vitamin D daily  ( cholecalciferol is D 3)    To get on or off MyChart, please call phone number .    Return to GI Clinic 2    [facility]   For appointments call Malini [phone]  Coordinator  [phone] [place] or call -  GI Nurse Triage  [phone] for Medical Questions, # 3  Fax results to             Follow-ups after your visit        Follow-up notes from your care team     Return in about 2 months (around 6/21/2017).      Your next 10 appointments already scheduled     Apr 24, 2017 11:30 AM CDT   Return Visit with ALISA Burt   [facility]  ([facility])    [address] [address]   [phone]            Apr 24, 2017  1:30 PM CDT   Office Visit with EVI Cardona CNP   [facility]    [address] [facility] [address]  [facility] [id number] [phone]           Bring a current list of meds and any records pertaining to this visit.  For Physicals, please bring immunization records and any forms needing to be filled out.  Please arrive 10 minutes early to complete paperwork.            Apr 25, 2017 11:00 AM CDT   Return Visit with Andres Johnson DPM   [facility] ([facility])    [address]  [facility] [address]   [phone]            May 02, 2017 11:30 AM CDT   Return Visit with ALISA Burt   [facility] ([facility])    [address] [address]   [phone]            May 09, 2017  9:30 AM CDT   Return Visit with ALISA Burt   [facility] ([facility])    [address] [address]   [phone]            May 16, 2017  1:30 PM CDT   Return Visit with ALISA Burt   [facility] ([facility])    [address] [address]   [phone]            Jun 22, 2017  1:50 PM CDT   (Arrive by 1:35 PM)   Return Botox with Frederick Bender MD   [facility] ([facility])    [address]  3rd Floor  [facility] [id number] [phone]              Future tests that were ordered for you today     Open Future Orders        Priority Expected Expires Ordered    Clostridium difficile toxin B PCR Routine  5/21/2017 4/21/2017    Enteric Bacteria and Virus Panel by [facility]  "Stool Routine  5/21/2017 4/21/2017    CBC with platelets differential Routine  5/21/2017 4/21/2017    CRP inflammation Routine  5/21/2017 4/21/2017            Who to contact     Please call your clinic at [phone] to:    Ask questions about your health    Make or cancel appointments    Discuss your medicines    Learn about your test results    Speak to your doctor   If you have compliments or concerns about an experience at your clinic, or if you wish to file a complaint, please contact [facility] Patient Relations at [phone] or email us at [email or web address]         Additional Information About Your Visit        Business Exchange Information     [employer] gives you secure access to your electronic health record. If you see a primary care provider, you can also send messages to your care team and make appointments. If you have questions, please call your primary care clinic.  If you do not have a primary care provider, please call [phone] and they will assist you.      [employer] is an electronic gateway that provides easy, online access to your medical records. With [employer], you can request a clinic appointment, read your test results, renew a prescription or communicate with your care team.     To access your existing account, please contact your [facility] or call [phone] for assistance.        Care EveryWhere ID     This is your Care EveryWhere ID. This could be used by other organizations to access your [facility] medical records  [id number]        Your Vitals Were     Pulse Height Last Period Pulse Oximetry BMI (Body Mass Index)       99 1.6 m (5' 3\") 04/07/2017 99% 26.57 kg/m2        Blood Pressure from Last 3 Encounters:   04/21/17 111/66   04/15/17 105/67   04/14/17 124/85    Weight from Last 3 Encounters:   04/21/17 68 kg (150 lb)   04/15/17 69 kg (152 lb 1.9 oz)   04/14/17 68 kg (150 lb)                 Today's Medication Changes "          These changes are accurate as of: 4/21/17 12:03 PM.  If you have any questions, ask your nurse or doctor.               Start taking these medicines.        Dose/Directions    vitamin D 2000 UNITS tablet   Used for:  Vitamin D deficiency   Started by:  Kacie Almeida APRN CNP        Dose:  2000 Units   Take 2,000 Units by mouth daily   Quantity:  100 tablet   Refills:  3         These medicines have changed or have updated prescriptions.        Dose/Directions    amitriptyline 25 MG tablet   Commonly known as:  ELAVIL   This may have changed:  additional instructions   Used for:  Atypical chest pain, Insomnia, unspecified type   Changed by:  Kacie Almeida APRN CNP        Dose:  25 mg   Take 1 tablet (25 mg) by mouth At Bedtime   Quantity:  45 tablet   Refills:  5       dicyclomine 20 MG tablet   Commonly known as:  BENTYL   This may have changed:    - when to take this  - reasons to take this  - Another medication with the same name was removed. Continue taking this medication, and follow the directions you see here.   Used for:  Abdominal cramping   Changed by:  Kacie Almeida APRN CNP        Dose:  20 mg   Take 1 tablet (20 mg) by mouth 4 times daily as needed   Quantity:  60 tablet   Refills:  1       * ondansetron 4 MG ODT tab   Commonly known as:  ZOFRAN-ODT   This may have changed:  Another medication with the same name was added. Make sure you understand how and when to take each.   Used for:  Chronic abdominal pain        Dose:  4 mg   Take 1 tablet (4 mg) by mouth every 6 hours as needed for nausea   Quantity:  20 tablet   Refills:  0       * ondansetron 8 MG ODT tab   Commonly known as:  ZOFRAN-ODT   This may have changed:  You were already taking a medication with the same name, and this prescription was added. Make sure you understand how and when to take each.   Used for:  Non-intractable vomiting with nausea, unspecified vomiting type, Hematemesis, presence of nausea not specified    Changed by:  Kacie Almeida APRN CNP        Dose:  8 mg   Take 1 tablet (8 mg) by mouth every 8 hours as needed for nausea   Quantity:  60 tablet   Refills:  11       * Notice:  This list has 2 medication(s) that are the same as other medications prescribed for you. Read the directions carefully, and ask your doctor or other care provider to review them with you.      Stop taking these medicines if you haven't already. Please contact your care team if you have questions.     lubiprostone 24 MCG capsule   Commonly known as:  AMITIZA   Stopped by:  Kacie Almeida APRN CNP                Where to get your medicines      These medications were sent to [facility] - [address], [facility] [address]     Phone:  [phone]     amitriptyline 25 MG tablet    dicyclomine 20 MG tablet    omeprazole 40 MG capsule    ondansetron 8 MG ODT tab    vitamin D 2000 UNITS tablet                Primary Care Provider Office Phone # Fax #    Sonja EVI Laguerre [phone]       [facility] [address]  [facility] [address]        Thank you!     Thank you for choosing [facility]  for your care. Our goal is always to provide you with excellent care. Hearing back from our patients is one way we can continue to improve our services. Please take a few minutes to complete the written survey that you may receive in the mail after your visit with us. Thank you!             Your Updated Medication List - Protect others around you: Learn how to safely use, store and throw away your medicines at www.disposemymeds.org.          This list is accurate as of: 4/21/17 12:03 PM.  Always use your most recent med list.                   Brand Name Dispense Instructions for use    albuterol 108 (90 BASE) MCG/ACT Inhaler    PROAIR HFA/PROVENTIL HFA/VENTOLIN HFA    1 Inhaler    Inhale 2 puffs into the  lungs every 6 hours as needed for shortness of breath / dyspnea or wheezing       amitriptyline 25 MG tablet    ELAVIL    45 tablet    Take 1 tablet (25 mg) by mouth At Bedtime       * Apremilast 10 & 20 & 30 MG Tbpk    OTEZLA    27 tablet    Take one tablet in the morning on day 1, then take one tablet every 12 hours on days 2 thru 14, according to the instructions on the packet.       * apremilast 30 MG tablet    OTEZLA    60 tablet    20 mg in AM and 30mg in PM daily X 2 weeks and then 30mg twice daily.       ASPIRIN CHILDRENS 81 MG chewable tablet   Generic drug:  aspirin      Take 81 mg by mouth daily       betamethasone valerate 0.1 % cream    VALISONE     Apply topically 2 times daily       * botulinum toxin type A 100 UNITS injection    BOTOX    200 Units    Inject 200 Units into the muscle every 3 months       * BOTOX IJ      Inject 150 Units into the muscle once Lot: /C3 Exp: 05/2019       * BOTOX IJ      Inject 150 Units into the muscle Lot # /C3  Exp: 8/2019       * BOTOX IJ      Inject 162.5 Units as directed once Lot #: /C3 Exp: 10/2019       carbamide peroxide 6.5 % otic solution    DEBROX     5 drops 2 times daily       clobetasol 0.05 % cream    TEMOVATE    60 g    Apply topically 2 times daily       Colchicine 0.6 MG Caps     90 capsule    Take 0.6 mg by mouth See Admin Instructions 2 capsules in AM and 1 capsule in PM       dexamethasone 4 MG/ML injection    DECADRON    30 mL    Apply 1 mL (4 mg) topically as needed       dicyclomine 20 MG tablet    BENTYL    60 tablet    Take 1 tablet (20 mg) by mouth 4 times daily as needed       EPINEPHrine 0.3 MG/0.3ML injection    EPIPEN 2-EAMON    0.6 mL    Inject 0.3 mLs (0.3 mg) into the muscle as needed for anaphylaxis       EXCEDRIN MIGRAINE PO      Take by mouth as needed       guanFACINE 1 MG tablet    TENEX    180 tablet    3 tablets in the morning and 3 tablets in the evening       hyoscyamine 0.125 MG tablet    ANASPAZ/LEVSIN    40  tablet    Take 1-2 tablets (125-250 mcg) by mouth every 4 hours as needed for cramping       hypromellose 0.3 % Soln ophthalmic solution    GENTEAL     1 drop every hour as needed       LACTAID PO      Take by mouth daily       lactobacillus rhamnosus (GG) capsule          lidocaine 2 % topical gel    XYLOCAINE     Apply 1 Tube topically daily Reported on 4/21/2017       lidocaine visc 2% 2.5mL/5mL & maalox/mylanta w/ simeth 2.5mL/5mL & diphenhydrAMINE 5mg/5mL Susp suspension    [facility]    1 Bottle    Swish and swallow 10 mLs in mouth every 6 hours as needed for mouth sores       linaclotide 290 MCG capsule    LINZESS    30 capsule    Take 1 capsule (290 mcg) by mouth every morning (before breakfast)       LORazepam 1 MG tablet    ATIVAN    90 tablet    Take 1-2 tablets (1-2 mg) by mouth every 8 hours as needed for other (abdominal pain) Do not operate a vehicle after taking this medication       meclizine 25 MG tablet    ANTIVERT    30 tablet    Take 1 tablet (25 mg) by mouth every 6 hours as needed for dizziness       norgestimate-ethinyl estradiol 0.25-35 MG-MCG per tablet    ORTHO-CYCLEN, SPRINTEC    84 tablet    Take 1 tablet by mouth daily       omeprazole 40 MG capsule    priLOSEC    90 capsule    Take 1 capsule (40 mg) by mouth daily       * ondansetron 4 MG ODT tab    ZOFRAN-ODT    20 tablet    Take 1 tablet (4 mg) by mouth every 6 hours as needed for nausea       * ondansetron 8 MG ODT tab    ZOFRAN-ODT    60 tablet    Take 1 tablet (8 mg) by mouth every 8 hours as needed for nausea       promethazine 25 MG tablet    PHENERGAN    20 tablet    Take 0.5-1 tablets (12.5-25 mg) by mouth every 6 hours as needed for nausea       RANITIDINE 75 PO      Take  by mouth. As needed for GI upset       sucralfate 1 GM/10ML suspension    CARAFATE    1200 mL    Take 10 mLs (1 g) by mouth 4 times daily       SUMAtriptan 100 MG tablet    IMITREX    18 tablet    Take 1 tablet (100 mg) by mouth at onset of  headache for migraine May repeat in 2 hours. Max 2 tablets/24 hours.       triamcinolone 0.1 % cream    KENALOG    15 g    Apply sparingly to oral ulcers three times daily for 14 days as needed.       UNABLE TO FIND      daily Reported on 4/21/2017       VITAMIN B6 PO      Reported on 4/21/2017       vitamin D 2000 UNITS tablet     100 tablet    Take 2,000 Units by mouth daily       * Notice:  This list has 8 medication(s) that are the same as other medications prescribed for you. Read the directions carefully, and ask your doctor or other care provider to review them with you.

## 2017-04-21 NOTE — PATIENT INSTRUCTIONS
Continue the omeprazole (Prilosec)   Continue the sucralfate Carafate   A portion of your nausea may be related to bile in the stomach.  Sucralfate binds the bile.  Take this 3-4 times daily   Always at bedtime.   Between meals, especially on an empty stomach.   Two hours after a meal, an hour or more before a meal.    It sounds like you had a kavon blankenship tear that caused the blood.    Call if red or black or coffee ground appearance.    If still diarrhea in two more weeks, then stool studies for infection.    Vitamin D daily  ( cholecalciferol is D 3)    To get on or off MyChart, please call phone number .    Return to GI Clinic 2    [facility]   For appointments call Malini [phone]  Coordinator  [phone] [place] or call -  GI Nurse Triage  [phone] for Medical Questions, # 3  Fax results to

## 2017-04-23 NOTE — PROGRESS NOTES
CHIEF COMPLAINT:  Followup regarding gastroparesis, atypical chest pain, esophageal studies, recent symptoms.      HISTORY OF PRESENT ILLNESS:  Samaar is a 21-year-old woman with Behcet's syndrome, polyarthralgia, Raynaud phenomenon and other conditions who has been evaluated at [facility], as well as in consultation with Dr. Vitaliy viveros and Dr. Samuel viveros in 02/2016 regarding the question of possible Crohn's disease.  She does not have Crohn's disease.  Nonetheless, she has a number of GI symptoms not yet smoothly managed. Last visit was in January.     Samara has an atypical chest pain occurring for no known cause, also not when she is eating or swallowing.  Last upper GI endoscopy was 06/2014 by [facility] for dysphagia, gas and bloat.  The mucosa was visually normal.  Biopsies of mid esophagus, stomach, duodenum and also random colon were all normal at that time.  Previous upper GI endoscopy 2005 for dysphagia had also been normal.  At this time, we had her do an esophageal motility and pH testing, completed in February.      Samara typically has alternating diarrhea and constipation and learned that linaclotide about every other day was good management for constipation; however, she has now been in the diarrhea cycle with 2-3 loose stools daily for well over a week.  On 04/12 through 04/13, she had a sudden increase to 13 stools daily.  She then also developed an increase in her vomiting and had a half hour steady vomiting.  At the end of that, she had much blood on the tissue and saw black stool for a couple of days.  She has returned to having loose to watery stool 2-3 times a day with no black but rather normal color.      Samara continues to have abdominal pain which increases and decreases and is sometimes clearly cramping, other times not.  It often wraps around the mid to upper abdomen towards the back on both sides.  She received dicyclomine 10 mg to use  1-2 at time and has no benefit from 1, has not yet used 2 at a time.  In her youth, she used that, as well as hyoscyamine.  She tried hyoscyamine a week ago during her intense symptoms and had no benefit.      Samara returned to use of amitriptyline, first 25 and then 50 mg after the December or January visit.  This does help her to sleep but does not seem to have much impact on her GI symptoms of nausea and vomiting generally. She no longer has the chest pain most days of the week; possibly an improvement secondary to the amitriptyline.     Samara is undergoing trials of medication with very gradual dose escalation due to GI intolerance, Otezla.  She remains at 15 mg in the morning and 30 mg in the afternoon.      Samara has had long-standing nausea with vomiting usually aborted with ondansetron.  She will usually take 2 at a time, sometimes 1.  She is only given 18 per month of the 4 mg dose so tolerates a lot of vomiting for lack of medication.  The vomiting usually last 10-15 minutes rather than a half hour of last week.      Samara had been prescribed sucralfate for bilious vomiting and used it regularly for a while and stopped it. She says, it helped. She reports having recently restarted it and also not yet having had to refill her bottle.  This suggests a very low level use.      MEDICAL HISTORY:  Positive for Behcet's; Tourette's; migraines; GERD; arthritis, possibly rheumatoid arthritis; anxiety and PTSD and others.      SURGICAL HISTORY:  Knee surgery 2013, dental surgery in toddler years, endoscopies.     Medications: reviewed and discussed.    Adverse reactions: reviewed and discussed. We cannot give metoclopramide due to Tourette's (and migraine).     FAMILY HISTORY:  Apparently negative for autoimmune disease.      SOCIAL HISTORY:  Single, lives with mother.  She is a never smoker with rare alcohol, no street drugs.      REVIEW OF SYSTEMS:  She has had weight loss since last fall of  "13-16 pounds.  This is associated with loss of appetite, primarily.  She continues to have numerous other broad symptoms including night sweats, chills, sense of stress.  She has thirst, but no hunger.  She denies rash at this time but has pruritus.  She has eye pain but has not had finding of uveitis, retinitis or other findings with the direct ophthalmologic review.  I note that her chest pain comes out of nowhere, not associated with ingestions, often when seated.  She reports abdominal pain being so bad that she cannot take a deep breath at times.  She also has chest pain on exertion and awaits a Cardiology referral, expecting to receive this when she sees her primary provider on Monday.      Samara denies any dysphagia above what she has had for many years which has been scoped and tested otherwise.  She has no chest pain with ingestions.  She has questions about when she passes out, though reports that it does not occur immediately after ingestions nor in response to her abdominal or chest pain. She has had sense of rapid heart rate, and it has frequently been very rapid.  She asks whether rapid heart rate would cause her to have fainting.      OBJECTIVE:   VITAL SIGNS:  Reviewed.  They are stable in blood pressure and heart rate and oxygen.  Weight 150 pounds/68 kg 4.  Height is 63 inches/1.6 meters and BMI 26.57.  Weight 11/16/2016 what was 29.4 BMI.  Pain today 6/10 \"everywhere.\"   GENERAL:  Clean, neatly groomed, pleasant woman who shows her distress only minimally with a serious expression through the eyes.  She answers questions herself through the interview, though her mother had answered for her when the nurse checked her in.   EYES:  Clear.   RESPIRATORY:  Breathing is grossly normal.   ABDOMEN:  Nontender.      RESULTS:  [facility] with no contrast with a 3.6 cm left adnexal cyst, mild prominence of several mesenteric and upper retroperitoneal " lymph nodes which is nonspecific, questionable mural stratification of the ascending colon.  No other findings.  No acute findings to explain her symptoms.  Mild nonspecific findings of the prominence of lymph nodes and questionable mural stratification of the ascending colon with no contrast.     Esophageal motility essentially normal with normal UES (upper esophageal sphincter) and LES contractions and relaxations.  Body of the esophagus had coordinated movements with no failed swallows or extraneous contractions.  She had some slightly bold above usual pressures in the distal esophagus, but they remained coordinated.  She reports that she did not have any chest pain during this study showing the mildly intense contractions.      Her pH study with impedance was entirely normal with DeMeester score of 9.2 with normal less than 14.7.  Note that most of the 10 reflux episodes where weakly acid (more likely post prandial) and occurred daytime.  There was no clear symptom association.           ASSESSMENT:  A 21-year-old woman with multiple gastrointestinal complaints over time which were abruptly worse 04/12 through 04/16.  She vomited for a half hour, does not know the color of her vomitus but had much blood on the tissue at the end.  In all likelihood, she had Sandra-Tamayo tear.  She did see blood in stools - melena, so it may have been a significant loss.  The visible blood in stool has stopped, and she has returned to one of her normal patterns of stool, 2-3 loose stools daily.      Samara continues to have her intermittent chest pain a few times a week, possibly slightly less often rather than having it most days since restart of amitriptyline, and she is unable to associated with any ingestion or activity.  See results.  We did not find any cause for her chest pain in the esophageal manometry or pH impedance studies.      Samara had reported feeling better with her nausea and vomiting using sucralfate  more frequently and has returned to using this.  She had a break from it. I have not yet found a person to have an overdose of aluminum with regular sucralfate use, and her kidney function is good.  With any ongoing vomiting, we wish her to continue the omeprazole to prevent esophageal damage and possibly decrease both the reflux, vomiting and bloat.  With her tendency toward constipation, she will return to the linaclotide on an every other day schedule and adjusted it as needed.  With the cramping abdominal pain which sometimes causes a very firm bloat, we encouraged her now to try dicyclomine 20 mg at a time.  It does typically occur shortly after meals, so we encouraged her to think of taking the dicyclomine well ahead of the meal.        FURTHER PLAN:   1.  As in assessment.    2.  If she continues to have diarrhea over the next 2 weeks, we asked her to do stool studies for C. diff and enteric bacteria and virus.    3.  Complete blood count with platelets and differential and CRP due to recent episode of what appears to have been an acute illness.   4.  Dicyclomine 20 mg now sent.   5.  Ondansetron 8 mg dissolvable tablet.  We wish this to be available for her to have up to twice daily.  She is now tolerating vomiting several days a week because she does not have adequate ondansetron, as she is tolerating far more nausea also due to lack of ondansetron.  This order has been sent, and prior authorization has been initiated.   6.  Amitriptyline is refilled.   7.  Vitamin D 2000 units of cholecalciferol should be continued daily and is sent again.   8.  Upper GI endoscopy (EGD) remains a consideration. She prefers no extra study.        We discussed the assessment and plan, provided results in writing and discussed what to followup interval she would prefer.  She is to call if she has any increase in symptoms. She is to go to ER if she has recurrent blood in vomitus. I am happy to work over the telephone to any  extent, but I need to see her occasionally.  I suggested every 3-6 months, though she suggested she may wish to come in 2 months.      Total visit 35 minutes with counseling time 25 minutes.         EVI DE LA FUENTE CNP             D: 2017 18:38   T: 2017 14:29   MT: LEIA      Name:     LUCINA BAH   MRN:      [id number]        Account:      [id number]   :      1995           Service Date: 2017      Document: [id number]

## 2017-04-24 ENCOUNTER — OFFICE VISIT (OUTPATIENT)
Dept: FAMILY MEDICINE | Facility: CLINIC | Age: 22
End: 2017-04-24
Payer: COMMERCIAL

## 2017-04-24 ENCOUNTER — OFFICE VISIT (OUTPATIENT)
Dept: BEHAVIORAL HEALTH | Facility: CLINIC | Age: 22
End: 2017-04-24
Payer: COMMERCIAL

## 2017-04-24 ENCOUNTER — TELEPHONE (OUTPATIENT)
Dept: PALLIATIVE MEDICINE | Facility: CLINIC | Age: 22
End: 2017-04-24

## 2017-04-24 VITALS
DIASTOLIC BLOOD PRESSURE: 71 MMHG | HEART RATE: 94 BPM | TEMPERATURE: 97.4 F | SYSTOLIC BLOOD PRESSURE: 104 MMHG | RESPIRATION RATE: 16 BRPM | HEIGHT: 63 IN | BODY MASS INDEX: 26.58 KG/M2 | WEIGHT: 150 LBS | OXYGEN SATURATION: 100 %

## 2017-04-24 DIAGNOSIS — G89.29 CHRONIC ABDOMINAL PAIN: ICD-10-CM

## 2017-04-24 DIAGNOSIS — M35.2 BEHCET'S DISEASE (H): ICD-10-CM

## 2017-04-24 DIAGNOSIS — F33.1 MODERATE EPISODE OF RECURRENT MAJOR DEPRESSIVE DISORDER (H): Primary | ICD-10-CM

## 2017-04-24 DIAGNOSIS — K92.0 HEMATEMESIS, PRESENCE OF NAUSEA NOT SPECIFIED: ICD-10-CM

## 2017-04-24 DIAGNOSIS — F41.1 GAD (GENERALIZED ANXIETY DISORDER): ICD-10-CM

## 2017-04-24 DIAGNOSIS — R10.9 CHRONIC ABDOMINAL PAIN: ICD-10-CM

## 2017-04-24 DIAGNOSIS — R11.2 NON-INTRACTABLE VOMITING WITH NAUSEA, UNSPECIFIED VOMITING TYPE: ICD-10-CM

## 2017-04-24 DIAGNOSIS — F43.10 PTSD (POST-TRAUMATIC STRESS DISORDER): Primary | ICD-10-CM

## 2017-04-24 DIAGNOSIS — F43.10 PTSD (POST-TRAUMATIC STRESS DISORDER): ICD-10-CM

## 2017-04-24 PROCEDURE — 99214 OFFICE O/P EST MOD 30 MIN: CPT | Performed by: NURSE PRACTITIONER

## 2017-04-24 PROCEDURE — 90834 PSYTX W PT 45 MINUTES: CPT | Performed by: SOCIAL WORKER

## 2017-04-24 RX ORDER — LORAZEPAM 1 MG/1
0.5 TABLET ORAL EVERY 6 HOURS PRN
Qty: 90 TABLET | Refills: 1 | Status: SHIPPED | OUTPATIENT
Start: 2017-04-24 | End: 2017-06-23

## 2017-04-24 RX ORDER — ONDANSETRON 8 MG/1
8 TABLET, ORALLY DISINTEGRATING ORAL EVERY 8 HOURS PRN
Qty: 60 TABLET | Refills: 11 | Status: SHIPPED | OUTPATIENT
Start: 2017-04-24 | End: 2017-09-19

## 2017-04-24 NOTE — PROGRESS NOTES
SUBJECTIVE:                                                    Samara Oropeza is a 21 year old female who presents to clinic today for the following health issues:      ED/UC Followup:    Facility:  [facility] Emergency [facility]  Date of visit: 04/14/17,04/15/17  Reason for visit: abdominal pain, nausea, vomiting BLOOD  Current Status: still having abdominal pain and nausea,  no vomiting     Was directed by ER physician to stay on ativan 0.5 mg 4 times daily due to ongoing chronic abdominal pain, worsening in the context of nausea and esophageal tear. She plans to follow up with GI within the next two weeks, and will stay on sucralfate at least 3-4 times daily. Knows she overdid it yesterday in terms of activty with helping a relative move, pack boxes, and caring for her boyfriend's younger brother for several hours. Had a hard time eating. Has been eating about 1-2 daily, usually cream of wheat and strawberries, and has been unable to keep protein shakes down.     Interested in possibly establishing with pain clinic due to chronic abdominal pain issues- not interested in narcotics, because they make her feel nauseous and tired, but is interested in other procedures if possible, such as nerve blocks.   Has continued to see counselor. Feels like mood has generally been stable. Having problems sleeping at night due to nightmares, and sometimes feeling anxious during the day. She is very adamant that she does not want to try a medication for anxiety or depression, other than ativan, as she is on enough medications already, and feels like her mood is generally stable.             Problem list and histories reviewed & adjusted, as indicated.  Additional history: as documented    Patient Active Problem List   Diagnosis     Tics - Tourette syndrome     IBS (irritable bowel syndrome)     Seasonal allergic rhinitis     Anxiety     Knee pain     Concussion     Milk protein intolerance     Headaches due to old  head injury     Behcet's disease (H)     Migraine     Spell of shaking     On colchicine therapy     Palpitations     Fibromyalgia     Rheumatoid arthritis of multiple sites without rheumatoid factor (H)     Raynaud's disease without gangrene     Chronic abdominal pain     Left knee pain     Patellofemoral instability     PTSD (post-traumatic stress disorder)     Cervical dystonia     Acute left ankle pain     Past Surgical History:   Procedure Laterality Date     ARTHROSCOPY KNEE WITH PATELLAR REALIGNMENT  7/25/2013    Procedure: ARTHROSCOPY KNEE WITH PATELLAR REALIGNMENT;  Left Knee Arthroscopy, Medial Patellofemoral Ligament Reconstruction with Allograft  ;  Surgeon: Jennifer Acevedo MD;  [facility]     DENTAL SURGERY  1996    Teeth removal     ENDOSCOPY UPPER, COLONOSCOPY, COMBINED  2005      ESOPH/GAS REFLUX TEST W NASAL IMPED >1 HR N/A 2/15/2017    Procedure: ESOPHAGEAL IMPEDENCE FUNCTION TEST WITH 24 HOUR PH GREATER THAN 1 HOUR;  Surgeon: Timothy Matta MD;  Location:  GI       Social History   Substance Use Topics     Smoking status: Never Smoker     Smokeless tobacco: Never Used     Alcohol use Yes      Comment: seldom     Family History   Problem Relation Age of Onset     Depression Mother      Neurologic Disorder Mother      Migraines, take imitrex injection.  Also in maternal grandmother.       Alcohol/Drug Father      Hypertension Father      Depression Father      Cardiovascular Maternal Grandmother      Depression Maternal Grandmother      Hypertension Maternal Grandmother      Alzheimer Disease Maternal Grandmother      Cardiovascular Maternal Grandfather      Hypertension Maternal Grandfather      Depression Maternal Grandfather      Alcohol/Drug Maternal Grandfather      Cardiovascular Paternal Grandmother      Hypertension Paternal Grandmother      Cardiovascular Paternal Grandfather      Hypertension Paternal Grandfather      Glaucoma No family hx of      Macular Degeneration  No family hx of          Current Outpatient Prescriptions   Medication Sig Dispense Refill     LORazepam (ATIVAN) 1 MG tablet Take 0.5 tablets (0.5 mg) by mouth every 6 hours as needed for other (abdominal pain) Do not operate a vehicle after taking this medication 90 tablet 1     ondansetron (ZOFRAN-ODT) 8 MG ODT tab Take 1 tablet (8 mg) by mouth every 8 hours as needed for nausea 60 tablet 11     lactobacillus rhamnosus, GG, (CULTURELL) capsule        aspirin (ASPIRIN CHILDRENS) 81 MG chewable tablet Take 81 mg by mouth daily       dicyclomine (BENTYL) 20 MG tablet Take 1 tablet (20 mg) by mouth 4 times daily as needed 60 tablet 1     amitriptyline (ELAVIL) 25 MG tablet Take 1 tablet (25 mg) by mouth At Bedtime 45 tablet 5     omeprazole (PRILOSEC) 40 MG capsule Take 1 capsule (40 mg) by mouth daily 90 capsule 3     Cholecalciferol (VITAMIN D) 2000 UNITS tablet Take 2,000 Units by mouth daily 100 tablet 3     EPINEPHrine (EPIPEN 2-EAMON) 0.3 MG/0.3ML injection Inject 0.3 mLs (0.3 mg) into the muscle as needed for anaphylaxis 0.6 mL 3     Apremilast (OTEZLA) 10 & 20 & 30 MG TBPK Take one tablet in the morning on day 1, then take one tablet every 12 hours on days 2 thru 14, according to the instructions on the packet. 27 tablet 0     apremilast (OTEZLA) 30 MG tablet 20 mg in AM and 30mg in PM daily X 2 weeks and then 30mg twice daily. 60 tablet 3     ondansetron (ZOFRAN-ODT) 4 MG ODT tab Take 1 tablet (4 mg) by mouth every 6 hours as needed for nausea 20 tablet 0     Colchicine 0.6 MG CAPS Take 0.6 mg by mouth See Admin Instructions 2 capsules in AM and 1 capsule in PM 90 capsule 3     dexamethasone (DECADRON) 4 MG/ML injection Apply 1 mL (4 mg) topically as needed 30 mL 0     OnabotulinumtoxinA (BOTOX IJ) Inject 162.5 Units as directed once Lot #: /C3  Exp: 10/2019       guanFACINE (TENEX) 1 MG tablet 3 tablets in the morning and 3 tablets in the evening 180 tablet 3     norgestimate-ethinyl estradiol  (ORTHO-CYCLEN, SPRINTEC) 0.25-35 MG-MCG per tablet Take 1 tablet by mouth daily 84 tablet 3     albuterol (PROAIR HFA/PROVENTIL HFA/VENTOLIN HFA) 108 (90 BASE) MCG/ACT Inhaler Inhale 2 puffs into the lungs every 6 hours as needed for shortness of breath / dyspnea or wheezing 1 Inhaler 1     OnabotulinumtoxinA (BOTOX IJ) Inject 150 Units into the muscle Lot # /C3  Exp: 8/2019       SUMAtriptan (IMITREX) 100 MG tablet Take 1 tablet (100 mg) by mouth at onset of headache for migraine May repeat in 2 hours. Max 2 tablets/24 hours. 18 tablet 3     promethazine (PHENERGAN) 25 MG tablet Take 0.5-1 tablets (12.5-25 mg) by mouth every 6 hours as needed for nausea 20 tablet 1     sucralfate (CARAFATE) 1 GM/10ML suspension Take 10 mLs (1 g) by mouth 4 times daily 1200 mL 2     meclizine (ANTIVERT) 25 MG tablet Take 1 tablet (25 mg) by mouth every 6 hours as needed for dizziness 30 tablet 1     Magic Mouthwash (FV std formula) lidocaine visc 2% 2.5mL/5mL & maalox/mylanta w/ simeth 2.5mL/5mL & diphenhydrAMINE 5mg/5mL Swish and swallow 10 mLs in mouth every 6 hours as needed for mouth sores 1 Bottle 1     clobetasol (TEMOVATE) 0.05 % cream Apply topically 2 times daily 60 g 0     OnabotulinumtoxinA (BOTOX IJ) Inject 150 Units into the muscle once Lot: /C3 Exp: 05/2019       botulinum toxin type A (BOTOX) 100 UNITS injection Inject 200 Units into the muscle every 3 months 200 Units 3     linaclotide (LINZESS) 290 MCG capsule Take 1 capsule (290 mcg) by mouth every morning (before breakfast) 30 capsule 1     UNABLE TO FIND daily Reported on 4/21/2017       hyoscyamine (ANASPAZ,LEVSIN) 0.125 MG tablet Take 1-2 tablets (125-250 mcg) by mouth every 4 hours as needed for cramping 40 tablet 1     Aspirin-Acetaminophen-Caffeine (EXCEDRIN MIGRAINE PO) Take by mouth as needed        hypromellose (GENTEAL) 0.3 % SOLN 1 drop every hour as needed       betamethasone valerate (VALISONE) 0.1 % cream Apply topically 2 times daily        "carbamide peroxide (DEBROX) 6.5 % otic solution 5 drops 2 times daily       Lactase (LACTAID PO) Take by mouth daily       Pyridoxine HCl (VITAMIN B6 PO) Reported on 4/21/2017       lidocaine (XYLOCAINE) 2 % jelly Apply 1 Tube topically daily Reported on 4/21/2017       triamcinolone (KENALOG) 0.1 % cream Apply sparingly to oral ulcers three times daily for 14 days as needed. 15 g 1     Ranitidine HCl (RANITIDINE 75 PO) Take  by mouth. As needed for GI upset         Reviewed and updated as needed this visit by clinical staff  Tobacco  Allergies  Meds       Reviewed and updated as needed this visit by Provider         ROS:  Constitutional, HEENT, cardiovascular, pulmonary, gi and gu systems are negative, except as otherwise noted.    OBJECTIVE:                                                    /71 (BP Location: Left arm)  Pulse 94  Temp 97.4  F (36.3  C) (Oral)  Resp 16  Ht 5' 3\" (1.6 m)  Wt 150 lb (68 kg)  LMP 04/07/2017  SpO2 100%  BMI 26.57 kg/m2  Body mass index is 26.57 kg/(m^2).  GENERAL: healthy, alert and no distress  NECK: no adenopathy, no asymmetry, masses, or scars and thyroid normal to palpation  RESP: lungs clear to auscultation - no rales, rhonchi or wheezes  CV: regular rate and rhythm, normal S1 S2, no S3 or S4, no murmur, click or rub, no peripheral edema and peripheral pulses strong    Diagnostic Test Results:  No results found for this or any previous visit (from the past 24 hour(s)).     ASSESSMENT/PLAN:                                                      Problem List Items Addressed This Visit     Chronic abdominal pain    Relevant Medications    LORazepam (ATIVAN) 1 MG tablet    Other Relevant Orders    [facility] ([place] REFERRAL    PTSD (post-traumatic stress disorder) - Primary      Other Visit Diagnoses     Non-intractable vomiting with nausea, unspecified vomiting type        Relevant Medications    ondansetron (ZOFRAN-ODT) 8 MG ODT tab    Hematemesis, " presence of nausea not specified        Relevant Medications    ondansetron (ZOFRAN-ODT) 8 MG ODT tab         Refills given for ativan and Zofran at regular doses; agree with plan that she should stay with regularly scheduled medications rather than PRN while her abdominal pain is worse.  Encouraged her to check in with pain management to discuss other options for pain control.  Continue psychotherapy for PTSD symptoms  EVI Cardona [facility]  Please note greater than 50% of this 30 minute appointment were spent in counseling with the patient of the issues described above in the history of present illness and in the plan, including changing dosing of medications/ providing referrals

## 2017-04-24 NOTE — MR AVS SNAPSHOT
"                  MRN:[id number]                      After Visit Summary   4/24/2017    Samara Oropeza    MRN: [id number]           Visit Information        Provider Department      4/24/2017 11:30 AM [facility]      Your next 10 appointments already scheduled     Apr 25, 2017 11:00 AM CDT   Return Visit with Andres Johnson DPM   [facility] ([facility])    [address]  [address]  [facility] [address]   [phone]            May 02, 2017 11:30 AM CDT   Return Visit with Layla Browne [facility] ([facility])    [address] [address]   [phone]            May 09, 2017  9:30 AM CDT   Return Visit with Layla Browne [facility] ([facility])    [address] [place] [address]   [phone]            May 16, 2017  1:30 PM CDT   Return Visit with Layla Browne [facility] ([facility])    [address] [place] [address]   [phone]            Jun 22, 2017  1:50 PM CDT   (Arrive by 1:35 PM)   Return Botox with Frederick Bender MD   [facility] ([facility])    [address] [place] [address] [phone]              [email or web address] Information     [email or web address] lets you send messages to your doctor, view your test results, renew your prescriptions, schedule appointments and more. To sign up, go to [email or web address] . Click on \"Log in\" on the left side of the screen, which will take you to the Welcome page. Then click on \"Sign up Now\" on the right side of the page.     You will be asked to enter the access code listed below, as well " as some personal information. Please follow the directions to create your username and password.     Your access code is: [email or web address]  Expires: 2017  1:39 PM     Your access code will  in 90 days. If you need help or a new code, please call your [facility] clinic or [phone].        Care EveryWhere ID     This is your Care EveryWhere ID. This could be used by other organizations to access your [facility] medical records  [id number]

## 2017-04-24 NOTE — TELEPHONE ENCOUNTER
----- Message from Hallie Reyes [facility] sent at 4/21/2017  5:00 PM CDT -----  Regarding: Otezla  Insurance agreed to pay for two packs of Otezla starter, providing total of 12 doses of 20mg (some as 10mg tablets, some as 20mg tablets) and 94 doses of 30mg tablets.    After 12 days at 20mg each morning and 30mg in evening, Henagar switches to 30mg twice a day.    Please call our pharmacy to verbally verify 2 starter packs (originally doctor had wanted 14 days of lower dose) to provide 12 days of 20mg each morning, based on package size of Starter Pack    Please call us [phone] to verify all these details on Monday.  Thanks

## 2017-04-24 NOTE — PROGRESS NOTES
Progress Note    Client Name: Samara Oropeza  Date: 4/24/2017            Service Type: Individual      Session Start Time: 11:55  Session End Time: 12:40      Session Length: 45     Session #: 7     Attendees: Client attended alone    Treatment Plan Last Reviewed: 3/14/2017   PHQ-9 / TAHIR-7 : 4/19/2017      DATA      Progress Since Last Session (Related to Symptoms / Goals / Homework):   Symptoms: Stable    Homework: Partially completed- has been working on all of the tasks, limited success with mindfulness      Episode of Care Goals: Satisfactory progress - ACTION (Actively working towards change); Intervened by reinforcing change plan / affirming steps taken     Current / Ongoing Stressors and Concerns:   Client states that she continues to have medical complications- has been seeing her specialists and completing tests.  Vomiting has subsided, she is struggling with eating.  She is eating twice a day, but still is losing weight. Worked about 3 hours this past week- has just been cleaning.  Has been doing her yoga prior to bed- helps her to get to sleep, but she is still has trouble with getting up and falling back to sleep.  Nightmares and physical pain distract her from sleeping.  Trouble concentrating on tasks. Went to see Grandfather and Dad.  Some stress at mom's home with her stepbrother.      Treatment Objective(s) Addressed in This Session:   use cognitive strategies identified in therapy to challenge anxious thoughts  Increase restful sleep     Intervention:   CBT: refocus on self-care        ASSESSMENT: Current Emotional / Mental Status (status of significant symptoms):   Risk status (Self / Other harm or suicidal ideation)   Client denies current fears or concerns for personal safety.   Client denies current or recent suicidal ideation or behaviors.   Client denies current or recent homicidal ideation or behaviors.   Client denies current or recent self  injurious behavior or ideation.   Client denies other safety concerns.   A safety and risk management plan has not been developed at this time, however client was given the after-hours number / 911 should there be a change in any of these risk factors.     Appearance:   Appropriate    Eye Contact:   Fair    Psychomotor Behavior: Normal    Attitude:   Cooperative    Orientation:   All   Speech    Rate / Production: Monotone     Volume:  Soft    Mood:    Anxious    Affect:    Flat    Thought Content:  Clear    Thought Form:  Coherent  Logical    Insight:    Fair      Medication Review:   No current psychiatric medications prescribed     Medication Compliance:   NA     Changes in Health Issues:   Yes: Pain, Associated Psychological Distress     Chemical Use Review:   Substance Use: Chemical use reviewed, no active concerns identified      Tobacco Use: No current tobacco use.       Collateral Reports Completed:   Not Applicable    PLAN: (Client Tasks / Therapist Tasks / Other)  Homework:      1. Encouraged to watch her stress levels around work    2.  Keep using the mindfulness for sleep (13 minute Body Scan) from website:  http://nilo.[email or web address].[email or web address]/[email or web address]-meditations    3.  Keep focusing on healthy eating small amount and more frequent versus one meal a day. (discussed foods to try).            Layla Browne, [facility]                                                         ________________________________________________________________________    Treatment Plan    Client's Name: Samara Oropeza  YOB: 1995    Date: 3/14/2017     DSM-V Diagnoses: 296.32 Major Depressive Disorder, Recurrent Episode, Moderate _ or 300.02 (F41.1) Generalized Anxiety Disorder  Psychosocial / Contextual Factors: Moderate- Minimal contact with family, Health issues  WHODAS: 30    Referral / Collaboration:  Referral to another professional/service is not indicated at this time..    Anticipated number of session or  this episode of care: 12      MeasurableTreatment Goal(s) related to diagnosis / functional impairment(s)  Goal 1: Client will decrease anxiety symptoms as eveidenced by self-report and TAHIR-7 scores, currently at 14, goal to 7 or below    I will know I've met my goal when I am better able to deal with it.      Objective #A (Client Action)    Client will use relaxation strategies 3x times per day to reduce the physical symptoms of anxiety.  Status: New - Date: 3/14/17     Intervention(s)  Therapist will teach different relaxation strategies and have client complete one between session.    Objective #B  Client will use cognitive strategies identified in therapy to challenge anxious thoughts.  Status: New - Date: 3/14/17     Intervention(s)  Therapist will 1.  introduce cognitive distortions; 2. build awareness for client; 3. leanr an implement diffferent cognitive restructuring techniques.    Objective #C  Client will increase her amount of restful sleep.  Status: New - Date: 3/14/17     Intervention(s)  Therapist will 1. start a sleep journal; 2. Introduce techniques for falling and staying asleep.        Client has reviewed and agreed to the above plan.      Layla Browne, [facility]  March 14, 2017

## 2017-04-24 NOTE — TELEPHONE ENCOUNTER
Called and spoke with pt.  Let her know that her insurance covered the starter packs so that she is able to start the 20 mg in the am, and 30 mg in the evening and that she does need to make an appt to see Dr. Marquez as she is continuing to have side effects.    Pt understands and will do so.    Lori Miner RN  [facility]

## 2017-04-24 NOTE — NURSING NOTE
"Chief Complaint   Patient presents with     ER F/U       Initial /71 (BP Location: Left arm)  Pulse 94  Temp 97.4  F (36.3  C) (Oral)  Resp 16  Ht 5' 3\" (1.6 m)  Wt 150 lb (68 kg)  LMP 04/07/2017  SpO2 100%  BMI 26.57 kg/m2 Estimated body mass index is 26.57 kg/(m^2) as calculated from the following:    Height as of this encounter: 5' 3\" (1.6 m).    Weight as of this encounter: 150 lb (68 kg).  Medication Reconciliation: complete     Mari Contreras CMA      "

## 2017-04-24 NOTE — MR AVS SNAPSHOT
After Visit Summary   4/24/2017    Samara Oropeza    MRN: [id number]           Patient Information     Date Of Birth          1995        Visit Information        Provider Department      4/24/2017 1:30 PM Sonja Abreu APRN [facility]        Today's Diagnoses     Chronic abdominal pain        Non-intractable vomiting with nausea, unspecified vomiting type        Hematemesis, presence of nausea not specified           Follow-ups after your visit        Additional Services     PAIN MANAGEMENT CENTER ([place]) REFERRAL       Your provider has referred you to the [facility].    Reason for Referral: Comprehensive Evaluation and Management    Please complete the following questions:    What is your diagnosis for the patient's pain? multifactorial    Do you have any specific questions for the pain specialist? No    Are there any red flags that may impact the assessment or management of the patient? None    **ANY DIAGNOSTIC TESTS THAT ARE NOT IN EPIC SHOULD BE SENT TO THE PAIN CENTER**    Please note the Pre-Op Pain Consult must be scheduled 2-3 weeks prior to the patient's surgery.  Patient's trying to schedule within 2 weeks of surgery may not be accommodated.     Pre-Op Pain Consults are only good for 30 days.    REGARDING OPIOID MEDICATIONS:  We will always address appropriateness of opioid pain medications, but we generally will not automatically take on a prescribing role. When we do take on prescribing of opioids for chronic pain, it is in collaboration with the referring physician for an intermediate period of time (months), with an expectation that the primary physician or provider will assume the prescribing role if medications are effective at stable doses with demonstrated compliance.  Therefore, please do not assume that your prescribing responsibilities end on the day of pain clinic consultation.  Is this agreeable to you? YES    For any  questions, contact the [facility] at [phone].    Please be aware that coverage of these services is subject to the terms and limitations of your health insurance plan.  Call member services at your health plan with any benefit or coverage questions.      Please bring the following with you to your appointment:    (1) Any X-Rays, CTs or MRIs which have been performed.  Contact the facility where they were done to arrange for  prior to your scheduled appointment.    (2) List of current medications   (3) This referral request   (4) Any documents/labs given to you for this referral                  Your next 10 appointments already scheduled     Apr 25, 2017 11:00 AM CDT   Return Visit with Andres Johnson DPM   [facility] ([facility])    [address] [address] [address]   [phone]            May 02, 2017 11:30 AM CDT   Return Visit with ALISA Burt   [facility] ([facility])    [address] [place] [id number] [phone]            May 09, 2017  9:30 AM CDT   Return Visit with ALISA Burt   [facility] ([facility])    [address] [place] [id number] [phone]            May 16, 2017  1:30 PM CDT   Return Visit with ALISA Burt   [facility] ([facility])    [address] [place] [id number] [phone]            Jun 22, 2017  1:50 PM CDT   (Arrive by 1:35 PM)   Return Botox with Frederick Bender MD   [facility] ([facility])    [address] [place] [id number] [phone]              Who to contact     If you have questions or need follow up information about today's  "clinic visit or your schedule please contact [facility] directly at [phone].  Normal or non-critical lab and imaging results will be communicated to you by MyChart, letter or phone within 4 business days after the clinic has received the results. If you do not hear from us within 7 days, please contact the clinic through [employer] or phone. If you have a critical or abnormal lab result, we will notify you by phone as soon as possible.  Submit refill requests through [employer] or call your pharmacy and they will forward the refill request to us. Please allow 3 business days for your refill to be completed.          Additional Information About Your Visit        MyChart Information     [employer] lets you send messages to your doctor, view your test results, renew your prescriptions, schedule appointments and more. To sign up, go to [email or web address] . Click on \"Log in\" on the left side of the screen, which will take you to the Welcome page. Then click on \"Sign up Now\" on the right side of the page.     You will be asked to enter the access code listed below, as well as some personal information. Please follow the directions to create your username and password.     Your access code is: [email or web address]  Expires: 2017  1:39 PM     Your access code will  in 90 days. If you need help or a new code, please call your [place] clinic or [phone].        Care EveryWhere ID     This is your Care EveryWhere ID. This could be used by other organizations to access your [place] medical records  [id number]        Your Vitals Were     Pulse Temperature Respirations Height Last Period Pulse Oximetry    94 97.4  F (36.3  C) (Oral) 16 5' 3\" (1.6 m) 2017 100%    BMI (Body Mass Index)                   26.57 kg/m2            Blood Pressure from Last 3 Encounters:   17 104/71   17 111/66   04/15/17 105/67    Weight from Last 3 Encounters:   17 150 lb (68 kg)   17 150 lb (68 " kg)   04/15/17 152 lb 1.9 oz (69 kg)              We Performed the Following     PAIN MANAGEMENT CENTER ([place]) REFERRAL          Today's Medication Changes          These changes are accurate as of: 4/24/17  1:39 PM.  If you have any questions, ask your nurse or doctor.               These medicines have changed or have updated prescriptions.        Dose/Directions    LORazepam 1 MG tablet   Commonly known as:  ATIVAN   This may have changed:    - how much to take  - when to take this   Used for:  Chronic abdominal pain   Changed by:  Sonja Abreu APRN CNP        Dose:  0.5 mg   Take 0.5 tablets (0.5 mg) by mouth every 6 hours as needed for other (abdominal pain) Do not operate a vehicle after taking this medication   Quantity:  90 tablet   Refills:  1            Where to get your medicines      These medications were sent to [place] [address] [place] [address] [address]     Phone:  [phone]     ondansetron 8 MG ODT tab         Some of these will need a paper prescription and others can be bought over the counter.  Ask your nurse if you have questions.     Bring a paper prescription for each of these medications     LORazepam 1 MG tablet                Primary Care Provider Office Phone # Fax #    EVI Cardona [phone]       [facility] [address]  [facility] [address]        Thank you!     Thank you for choosing [facility]  for your care. Our goal is always to provide you with excellent care. Hearing back from our patients is one way we can continue to improve our services. Please take a few minutes to complete the written survey that you may receive in the mail after your visit with us. Thank you!             Your Updated Medication List - Protect others around you: Learn how to safely use, store and throw away your medicines at www.disposemymeds.org.          This list is  accurate as of: 4/24/17  1:39 PM.  Always use your most recent med list.                   Brand Name Dispense Instructions for use    albuterol 108 (90 BASE) MCG/ACT Inhaler    PROAIR HFA/PROVENTIL HFA/VENTOLIN HFA    1 Inhaler    Inhale 2 puffs into the lungs every 6 hours as needed for shortness of breath / dyspnea or wheezing       amitriptyline 25 MG tablet    ELAVIL    45 tablet    Take 1 tablet (25 mg) by mouth At Bedtime       * Apremilast 10 & 20 & 30 MG Tbpk    OTEZLA    27 tablet    Take one tablet in the morning on day 1, then take one tablet every 12 hours on days 2 thru 14, according to the instructions on the packet.       * apremilast 30 MG tablet    OTEZLA    60 tablet    20 mg in AM and 30mg in PM daily X 2 weeks and then 30mg twice daily.       ASPIRIN CHILDRENS 81 MG chewable tablet   Generic drug:  aspirin      Take 81 mg by mouth daily       betamethasone valerate 0.1 % cream    VALISONE     Apply topically 2 times daily       * botulinum toxin type A 100 UNITS injection    BOTOX    200 Units    Inject 200 Units into the muscle every 3 months       * BOTOX IJ      Inject 150 Units into the muscle once Lot: /C3 Exp: 05/2019       * BOTOX IJ      Inject 150 Units into the muscle Lot # /C3  Exp: 8/2019       * BOTOX IJ      Inject 162.5 Units as directed once Lot #: /C3 Exp: 10/2019       carbamide peroxide 6.5 % otic solution    DEBROX     5 drops 2 times daily       clobetasol 0.05 % cream    TEMOVATE    60 g    Apply topically 2 times daily       Colchicine 0.6 MG Caps     90 capsule    Take 0.6 mg by mouth See Admin Instructions 2 capsules in AM and 1 capsule in PM       dexamethasone 4 MG/ML injection    DECADRON    30 mL    Apply 1 mL (4 mg) topically as needed       dicyclomine 20 MG tablet    BENTYL    60 tablet    Take 1 tablet (20 mg) by mouth 4 times daily as needed       EPINEPHrine 0.3 MG/0.3ML injection    EPIPEN 2-EAMON    0.6 mL    Inject 0.3 mLs (0.3 mg) into the  muscle as needed for anaphylaxis       EXCEDRIN MIGRAINE PO      Take by mouth as needed       guanFACINE 1 MG tablet    TENEX    180 tablet    3 tablets in the morning and 3 tablets in the evening       hyoscyamine 0.125 MG tablet    ANASPAZ/LEVSIN    40 tablet    Take 1-2 tablets (125-250 mcg) by mouth every 4 hours as needed for cramping       hypromellose 0.3 % Soln ophthalmic solution    GENTEAL     1 drop every hour as needed       LACTAID PO      Take by mouth daily       lactobacillus rhamnosus (GG) capsule          lidocaine 2 % topical gel    XYLOCAINE     Apply 1 Tube topically daily Reported on 4/21/2017       lidocaine visc 2% 2.5mL/5mL & maalox/mylanta w/ simeth 2.5mL/5mL & diphenhydrAMINE 5mg/5mL Susp suspension    [facility]    1 Bottle    Swish and swallow 10 mLs in mouth every 6 hours as needed for mouth sores       linaclotide 290 MCG capsule    LINZESS    30 capsule    Take 1 capsule (290 mcg) by mouth every morning (before breakfast)       LORazepam 1 MG tablet    ATIVAN    90 tablet    Take 0.5 tablets (0.5 mg) by mouth every 6 hours as needed for other (abdominal pain) Do not operate a vehicle after taking this medication       meclizine 25 MG tablet    ANTIVERT    30 tablet    Take 1 tablet (25 mg) by mouth every 6 hours as needed for dizziness       norgestimate-ethinyl estradiol 0.25-35 MG-MCG per tablet    ORTHO-CYCLEN, SPRINTEC    84 tablet    Take 1 tablet by mouth daily       omeprazole 40 MG capsule    priLOSEC    90 capsule    Take 1 capsule (40 mg) by mouth daily       * ondansetron 4 MG ODT tab    ZOFRAN-ODT    20 tablet    Take 1 tablet (4 mg) by mouth every 6 hours as needed for nausea       * ondansetron 8 MG ODT tab    ZOFRAN-ODT    60 tablet    Take 1 tablet (8 mg) by mouth every 8 hours as needed for nausea       promethazine 25 MG tablet    PHENERGAN    20 tablet    Take 0.5-1 tablets (12.5-25 mg) by mouth every 6 hours as needed for nausea       RANITIDINE 75  PO      Take  by mouth. As needed for GI upset       sucralfate 1 GM/10ML suspension    CARAFATE    1200 mL    Take 10 mLs (1 g) by mouth 4 times daily       SUMAtriptan 100 MG tablet    IMITREX    18 tablet    Take 1 tablet (100 mg) by mouth at onset of headache for migraine May repeat in 2 hours. Max 2 tablets/24 hours.       triamcinolone 0.1 % cream    KENALOG    15 g    Apply sparingly to oral ulcers three times daily for 14 days as needed.       UNABLE TO FIND      daily Reported on 4/21/2017       VITAMIN B6 PO      Reported on 4/21/2017       vitamin D 2000 UNITS tablet     100 tablet    Take 2,000 Units by mouth daily       * Notice:  This list has 8 medication(s) that are the same as other medications prescribed for you. Read the directions carefully, and ask your doctor or other care provider to review them with you.

## 2017-04-24 NOTE — TELEPHONE ENCOUNTER
Pain Management Center Referral      1. Confirmed address with patient? Yes  2. Confirmed phone number with patient? Yes  3. Confirmed referring provider? Yes  4. Is the PCP the same as the referring provider? Yes  5. Has the patient been to any previous pain clinics? Yes  (If yes, send RADHA with welcome letter)  6. Which insurance are we to bill for this appointment?      7. Informed pt of cancellation (48 hour) policy? Yes    REGARDING OPIOID MEDICATIONS: We will always address appropriateness of opioid pain medications, but we generally will not automatically take on a prescribing role. When we do take on prescribing of opioids for chronic pain, it is in collaboration with the referring physician for an intermediate period of time (months), with an expectation that the primary physician or provider will assume the prescribing role if medications are effective at stable doses with demonstrated compliance. Therefore, please do not assume that your prescribing responsibilities end on the day of pain clinic consultation.  7. Informed pt of prescribing policy? Yes      8. Referring Provider: Sonja Abreu

## 2017-04-24 NOTE — LETTER
April 24, 2017    Samara Oropeza  [address]  [place] [facility] [address]    Dear Samara          Welcome to the [facility] at the [facility]. We are located on the 6th floor, [address], of the [facility] located at [address] [place] [address]. For general parking, the [facility] [address] is the closest to our building.     Your appointment at the [facility] has been scheduled on May 10th at 11:00am with Natalie Cha MD.    At your first visit, you will meet your team of caregivers who will help you to develop pain management strategies that will last a lifetime. You will meet with our support staff to validate parking at a reduced rate, review your insurance information, and collect your co-payment if required by your insurance company. You will also meet with a medical pain specialist and care coordinator who will assess your pain and develop a plan of care for your successful pain rehabilitation. You should expect to spend 1-2 hours at your first visit with us. Usually, patients work with us for a period of 6-12 months, and eventually return to their primary doctor once their pain management has stabilized.      To help us make your visit go as smoothly as possible, please bring the following items with you on your visit:   Completed Pain Questionnaire enclosed in this packet.  If you do not bring the completed questionnaire, we may have to reschedule your appointment.  List of any medicines that you are currently taking or have been prescribed  Important NON-[place] medical information such as medical records or tests results (X-rays, or laboratory tests)  Your health insurance card  Financial resources to cover your co-payment or balance due at the time of service (cash, personal check, Visa, and MasterCard are acceptable methods of payment)     Due to the demand for new patient  evaluations, you must notify the scheduling department 48 hours in advance if you are not able to keep this appointment.  Failure to do so could affect your ability to reschedule with our clinic. Please do not assume that you will  receive any prescription medications at your first visit.    Please call [phone] with any questions regarding your appointment.  We look forward to meeting you and working to address your health care needs.       Sincerely,    [facility]

## 2017-04-25 ENCOUNTER — OFFICE VISIT (OUTPATIENT)
Dept: PODIATRY | Facility: CLINIC | Age: 22
End: 2017-04-25
Payer: COMMERCIAL

## 2017-04-25 ENCOUNTER — TELEPHONE (OUTPATIENT)
Dept: GASTROENTEROLOGY | Facility: CLINIC | Age: 22
End: 2017-04-25

## 2017-04-25 ENCOUNTER — TELEPHONE (OUTPATIENT)
Dept: FAMILY MEDICINE | Facility: CLINIC | Age: 22
End: 2017-04-25

## 2017-04-25 VITALS — HEIGHT: 63 IN | WEIGHT: 150 LBS | HEART RATE: 88 BPM | BODY MASS INDEX: 26.58 KG/M2

## 2017-04-25 DIAGNOSIS — S93.492D SPRAIN OF OTHER LIGAMENT OF LEFT ANKLE, SUBSEQUENT ENCOUNTER: Primary | ICD-10-CM

## 2017-04-25 PROCEDURE — 99213 OFFICE O/P EST LOW 20 MIN: CPT | Performed by: PODIATRIST

## 2017-04-25 NOTE — PROGRESS NOTES
"Foot & Ankle Surgery   April 25, 2017    S:  Pt is seen today for evaluation of L ankle sprain.  Walking boot, transitioned to ankle brace, home RICE/NSAID and PT.  Ankle is moving better, but no improvement in pain.      Vitals:    04/25/17 1421   Pulse: 88   Weight: 150 lb (68 kg)   Height: 5' 3\" (1.6 m)   '      ROS - Pos for CC.  Patient denies current nausea, vomiting, chills, fevers, belly pain, calf pain, chest pain or SOB.  Complete remainder of ROS it otherwise neg.      PE:  Gen:   No apparent distress  Neuro:   A&Ox3, no deficits  Psych:    Answering questions appropriately for age and situation with normal affect  Head:    NCAT  Eye:    Visual scanning without deficit  Ear:    Response to auditory stimuli wnl  Lung:    Non-labored breathing on RA noted  Abd:    NTND per patient report  Lymph:   Mild edema anterolateral L ankle  Vasc:    Pulses palpable, CFT minimally delayed  Neuro:    Light touch sensation intact to all sensory nerve distributions without paresthesias  Derm:    Neg for nodules, lesions or ulcerations  MSK:    Very painful over ATFL and at syndesmosis, pain/guarding with anterior drawer  Calf:    Neg for redness, swelling or tenderness      Assessment:  21 year old female with continued pain from L ankle sprain      Plan:  Discussed etiologies/options  1.  L ankle sprain  -continue brace  -continue PT  -RICE/NSAID prn  -MRI to assess for ligament damage    Follow up:  1 week to review results      Body mass index is 26.57 kg/(m^2).  Weight management plan: Patient was referred to their PCP to discuss a diet and exercise plan.         Andres Johnson DPM   Podiatric Foot & Ankle Surgeon  [facility]  [phone]  "

## 2017-04-25 NOTE — MR AVS SNAPSHOT
After Visit Summary   4/25/2017    Samara Oropeza    MRN: [id number]           Patient Information     Date Of Birth          1995        Visit Information        Provider Department      4/25/2017 2:15 PM Andres Johnson DPM [facility]        Today's Diagnoses     Sprain of other ligament of left ankle, subsequent encounter    -  1       Follow-ups after your visit        Your next 10 appointments already scheduled     Apr 26, 2017  3:45 PM CDT   Office Visit with EVI Cardona CNP   [facility] ([facility])    [address] [place] [address]  [address]  [facility] [address] [phone]           Bring a current list of meds and any records pertaining to this visit.  For Physicals, please bring immunization records and any forms needing to be filled out.  Please arrive 10 minutes early to complete paperwork.            Apr 26, 2017  5:45 PM CDT   MR ANKLE LEFT W/O CONTRAST with [id number]   [facility] ([facility])    [address]  [facility] [address] [phone]           Take your medicines as usual, unless your doctor tells you not to. Bring a list of your current medicines to your exam (including vitamins, minerals and over-the-counter drugs). Also bring the results of similar scans you may have had.  Please remove any body piercings and hair extensions before you arrive.  Follow your doctor s orders. If you do not, we may have to postpone your exam.  You will not have contrast for this exam. You do not need to do anything special to prepare.  The MRI machine uses a strong magnet. Please wear clothes without metal (snaps, zippers). A sweatsuit works well, or we may give you a hospital gown.   **IMPORTANT** THE INSTRUCTIONS BELOW ARE ONLY FOR THOSE PATIENTS WHO HAVE BEEN TOLD THEY WILL RECEIVE SEDATION OR GENERAL ANESTHESIA DURING THEIR MRI PROCEDURE:  IF YOU WILL  RECEIVE SEDATION (take medicine to help you relax during your exam):   You must get the medicine from your doctor before you arrive. Bring the medicine to the exam. Do not take it at home.   Arrive one hour early. Bring someone who can take you home after the test. Your medicine will make you sleepy. After the exam, you may not drive, take a bus or take a taxi by yourself.   No eating 8 hours before your exam. You may have clear liquids up until 4 hours before your exam. (Clear liquids include water, clear tea, black coffee and fruit juice without pulp.)  IF YOU WILL RECEIVE ANESTHESIA (be asleep for your exam):   Arrive 1 1/2 hours early. Bring someone who can take you home after the test. You may not drive, take a bus or take a taxi by yourself.   No eating 8 hours before your exam. You may have clear liquids up until 4 hours before your exam. (Clear liquids include water, clear tea, black coffee and fruit juice without pulp.)   You will spend four to five hours in the recovery room.  Please call the Imaging Department at your exam site with any questions.            May 02, 2017 11:30 AM CDT   Return Visit with ALISA Burt   [facility] ([facility])    [address] [address] [phone]            May 02, 2017  2:15 PM CDT   Return Visit with Andres Johnson DPM   [facility] ([facility])    [address]  [address]  [facility] [address]   [phone]            May 09, 2017  9:30 AM CDT   Return Visit with ALISA Burt   [facility] ([facility])    [address] [id number] [phone]            May 10, 2017 11:00 AM CDT   New Visit with Natalie Cha MD   [facility] ([facility])    [address]  [facility] [address]  [facility] [address]  "  [phone]            May 16, 2017  1:30 PM CDT   Return Visit with ALISA Burt   [facility] ([facility])    [address] [id number] [phone]            Jun 22, 2017  1:50 PM CDT   (Arrive by 1:35 PM)   Return Botox with Frederick Bender MD   [facility] ([facility])    [address]  [address] [facility] [address] [phone]              Future tests that were ordered for you today     Open Future Orders        Priority Expected Expires Ordered    MR Ankle Left w/o Contrast Routine  4/25/2018 4/25/2017            Who to contact     If you have questions or need follow up information about today's clinic visit or your schedule please contact [facility] directly at [phone].  Normal or non-critical lab and imaging results will be communicated to you by ibabyboxhart, letter or phone within 4 business days after the clinic has received the results. If you do not hear from us within 7 days, please contact the clinic through [employer] or phone. If you have a critical or abnormal lab result, we will notify you by phone as soon as possible.  Submit refill requests through SeeToo or call your pharmacy and they will forward the refill request to us. Please allow 3 business days for your refill to be completed.          Additional Information About Your Visit        SeeToo Information     SeeToo lets you send messages to your doctor, view your test results, renew your prescriptions, schedule appointments and more. To sign up, go to [email or web address] . Click on \"Log in\" on the left side of the screen, which will take you to the Welcome page. Then click on \"Sign up Now\" on the right side of the page.     You will be asked to enter the access code listed below, as well as some personal information. Please follow the directions " "to create your username and password.     Your access code is: [email or web address]  Expires: 2017  1:39 PM     Your access code will  in 90 days. If you need help or a new code, please call your [place] clinic or [phone].        Care EveryWhere ID     This is your Care EveryWhere ID. This could be used by other organizations to access your [place] medical records  [id number]        Your Vitals Were     Pulse Height Last Period BMI (Body Mass Index)          88 5' 3\" (1.6 m) 2017 26.57 kg/m2         Blood Pressure from Last 3 Encounters:   17 104/71   17 111/66   04/15/17 105/67    Weight from Last 3 Encounters:   17 150 lb (68 kg)   17 150 lb (68 kg)   17 150 lb (68 kg)               Primary Care Provider Office Phone # Fax #    EVI Cardona [facility] [phone]       [facility] [address] [facility] [address]  [facility] [address]        Thank you!     Thank you for choosing [facility]  for your care. Our goal is always to provide you with excellent care. Hearing back from our patients is one way we can continue to improve our services. Please take a few minutes to complete the written survey that you may receive in the mail after your visit with us. Thank you!             Your Updated Medication List - Protect others around you: Learn how to safely use, store and throw away your medicines at www.disposemymeds.org.          This list is accurate as of: 17  4:17 PM.  Always use your most recent med list.                   Brand Name Dispense Instructions for use    albuterol 108 (90 BASE) MCG/ACT Inhaler    PROAIR HFA/PROVENTIL HFA/VENTOLIN HFA    1 Inhaler    Inhale 2 puffs into the lungs every 6 hours as needed for shortness of breath / dyspnea or wheezing       amitriptyline 25 MG tablet    ELAVIL    45 tablet    Take 1 tablet (25 mg) by mouth At Bedtime       * Apremilast 10 & 20 & 30 MG Tbpk    OTEZLA    27 tablet    Take " one tablet in the morning on day 1, then take one tablet every 12 hours on days 2 thru 14, according to the instructions on the packet.       * apremilast 30 MG tablet    OTEZLA    60 tablet    20 mg in AM and 30mg in PM daily X 2 weeks and then 30mg twice daily.       ASPIRIN CHILDRENS 81 MG chewable tablet   Generic drug:  aspirin      Take 81 mg by mouth daily       betamethasone valerate 0.1 % cream    VALISONE     Apply topically 2 times daily       * botulinum toxin type A 100 UNITS injection    BOTOX    200 Units    Inject 200 Units into the muscle every 3 months       * BOTOX IJ      Inject 150 Units into the muscle once Lot: /C3 Exp: 05/2019       * BOTOX IJ      Inject 150 Units into the muscle Lot # /C3  Exp: 8/2019       * BOTOX IJ      Inject 162.5 Units as directed once Lot #: /C3 Exp: 10/2019       carbamide peroxide 6.5 % otic solution    DEBROX     5 drops 2 times daily       clobetasol 0.05 % cream    TEMOVATE    60 g    Apply topically 2 times daily       Colchicine 0.6 MG Caps     90 capsule    Take 0.6 mg by mouth See Admin Instructions 2 capsules in AM and 1 capsule in PM       dexamethasone 4 MG/ML injection    DECADRON    30 mL    Apply 1 mL (4 mg) topically as needed       dicyclomine 20 MG tablet    BENTYL    60 tablet    Take 1 tablet (20 mg) by mouth 4 times daily as needed       EPINEPHrine 0.3 MG/0.3ML injection    EPIPEN 2-EAMON    0.6 mL    Inject 0.3 mLs (0.3 mg) into the muscle as needed for anaphylaxis       EXCEDRIN MIGRAINE PO      Take by mouth as needed       guanFACINE 1 MG tablet    TENEX    180 tablet    3 tablets in the morning and 3 tablets in the evening       hyoscyamine 0.125 MG tablet    ANASPAZ/LEVSIN    40 tablet    Take 1-2 tablets (125-250 mcg) by mouth every 4 hours as needed for cramping       hypromellose 0.3 % Soln ophthalmic solution    GENTEAL     1 drop every hour as needed       LACTAID PO      Take by mouth daily       lactobacillus rhamnosus  (GG) capsule          lidocaine 2 % topical gel    XYLOCAINE     Apply 1 Tube topically daily Reported on 4/21/2017       lidocaine visc 2% 2.5mL/5mL & maalox/mylanta w/ simeth 2.5mL/5mL & diphenhydrAMINE 5mg/5mL Susp suspension    [facility] Mouthwash [facility]    1 Bottle    Swish and swallow 10 mLs in mouth every 6 hours as needed for mouth sores       linaclotide 290 MCG capsule    LINZESS    30 capsule    Take 1 capsule (290 mcg) by mouth every morning (before breakfast)       LORazepam 1 MG tablet    ATIVAN    90 tablet    Take 0.5 tablets (0.5 mg) by mouth every 6 hours as needed for other (abdominal pain) Do not operate a vehicle after taking this medication       meclizine 25 MG tablet    ANTIVERT    30 tablet    Take 1 tablet (25 mg) by mouth every 6 hours as needed for dizziness       norgestimate-ethinyl estradiol 0.25-35 MG-MCG per tablet    ORTHO-CYCLEN, SPRINTEC    84 tablet    Take 1 tablet by mouth daily       omeprazole 40 MG capsule    priLOSEC    90 capsule    Take 1 capsule (40 mg) by mouth daily       * ondansetron 4 MG ODT tab    ZOFRAN-ODT    20 tablet    Take 1 tablet (4 mg) by mouth every 6 hours as needed for nausea       * ondansetron 8 MG ODT tab    ZOFRAN-ODT    60 tablet    Take 1 tablet (8 mg) by mouth every 8 hours as needed for nausea       promethazine 25 MG tablet    PHENERGAN    20 tablet    Take 0.5-1 tablets (12.5-25 mg) by mouth every 6 hours as needed for nausea       RANITIDINE 75 PO      Take  by mouth. As needed for GI upset       sucralfate 1 GM/10ML suspension    CARAFATE    1200 mL    Take 10 mLs (1 g) by mouth 4 times daily       SUMAtriptan 100 MG tablet    IMITREX    18 tablet    Take 1 tablet (100 mg) by mouth at onset of headache for migraine May repeat in 2 hours. Max 2 tablets/24 hours.       triamcinolone 0.1 % cream    KENALOG    15 g    Apply sparingly to oral ulcers three times daily for 14 days as needed.       UNABLE TO FIND      daily Reported on 4/21/2017        VITAMIN B6 PO      Reported on 4/21/2017       vitamin D 2000 UNITS tablet     100 tablet    Take 2,000 Units by mouth daily       * Notice:  This list has 8 medication(s) that are the same as other medications prescribed for you. Read the directions carefully, and ask your doctor or other care provider to review them with you.

## 2017-04-25 NOTE — NURSING NOTE
"Chief Complaint   Patient presents with     RECHECK     Left ankle pain 4 wk f/u, ROM is better but pain is worse, wearing TriLok now       Initial Pulse 88  Ht 5' 3\" (1.6 m)  Wt 150 lb (68 kg)  LMP 04/07/2017  BMI 26.57 kg/m2 Estimated body mass index is 26.57 kg/(m^2) as calculated from the following:    Height as of this encounter: 5' 3\" (1.6 m).    Weight as of this encounter: 150 lb (68 kg).  Medication Reconciliation: complete  "

## 2017-04-25 NOTE — TELEPHONE ENCOUNTER
Prior Authorization Retail Medication Request  Medication/Dose: omeprazole  Diagnosis and ICD code: GERD  New/Renewal/Insurance Change PA: Renewal  Previously Tried and Failed Therapies:Omeprazole 20 mg daily    Insurance ID (if provided):   Insurance Phone (if provided):    Any additional info from fax request:     If you received a fax notification from an outside Pharmacy:  Pharmacy Name:[facility]  Pharmacy #:  Pharmacy Fax:[phone]
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
## 2017-04-26 ENCOUNTER — OFFICE VISIT (OUTPATIENT)
Dept: FAMILY MEDICINE | Facility: CLINIC | Age: 22
End: 2017-04-26
Payer: COMMERCIAL

## 2017-04-26 ENCOUNTER — HOSPITAL ENCOUNTER (OUTPATIENT)
Dept: MRI IMAGING | Facility: CLINIC | Age: 22
Discharge: HOME | End: 2017-04-26
Attending: PODIATRIST | Admitting: PODIATRIST
Payer: COMMERCIAL

## 2017-04-26 VITALS
TEMPERATURE: 97.8 F | HEART RATE: 96 BPM | SYSTOLIC BLOOD PRESSURE: 102 MMHG | BODY MASS INDEX: 26.57 KG/M2 | WEIGHT: 150 LBS | OXYGEN SATURATION: 100 % | DIASTOLIC BLOOD PRESSURE: 70 MMHG

## 2017-04-26 DIAGNOSIS — S93.492D SPRAIN OF OTHER LIGAMENT OF LEFT ANKLE, SUBSEQUENT ENCOUNTER: ICD-10-CM

## 2017-04-26 DIAGNOSIS — R10.32 LLQ ABDOMINAL PAIN: ICD-10-CM

## 2017-04-26 DIAGNOSIS — S13.9XXA SPRAIN OF NECK, INITIAL ENCOUNTER: Primary | ICD-10-CM

## 2017-04-26 DIAGNOSIS — G24.3 CERVICAL DYSTONIA: ICD-10-CM

## 2017-04-26 LAB
ERYTHROCYTE [DISTWIDTH] IN BLOOD BY AUTOMATED COUNT: 13.2 % (ref 10–15)
HCT VFR BLD AUTO: 36.3 % (ref 35–47)
HGB BLD-MCNC: 12.1 G/DL (ref 11.7–15.7)
MCH RBC QN AUTO: 29.8 PG (ref 26.5–33)
MCHC RBC AUTO-ENTMCNC: 33.3 G/DL (ref 31.5–36.5)
MCV RBC AUTO: 89 FL (ref 78–100)
PLATELET # BLD AUTO: 238 10E9/L (ref 150–450)
RBC # BLD AUTO: 4.06 10E12/L (ref 3.8–5.2)
WBC # BLD AUTO: 4.9 10E9/L (ref 4–11)

## 2017-04-26 PROCEDURE — 99213 OFFICE O/P EST LOW 20 MIN: CPT | Performed by: NURSE PRACTITIONER

## 2017-04-26 PROCEDURE — 73721 MRI JNT OF LWR EXTRE W/O DYE: CPT | Mod: LT

## 2017-04-26 PROCEDURE — 36415 COLL VENOUS BLD VENIPUNCTURE: CPT | Performed by: NURSE PRACTITIONER

## 2017-04-26 PROCEDURE — 85027 COMPLETE CBC AUTOMATED: CPT | Performed by: NURSE PRACTITIONER

## 2017-04-26 RX ORDER — METAXALONE 800 MG/1
400 TABLET ORAL 3 TIMES DAILY PRN
Qty: 30 TABLET | Refills: 1 | Status: SHIPPED | OUTPATIENT
Start: 2017-04-26 | End: 2017-10-09

## 2017-04-26 NOTE — MR AVS SNAPSHOT
After Visit Summary   4/26/2017    Samara Oropeza    MRN: [id number]           Patient Information     Date Of Birth          1995        Visit Information        Provider Department      4/26/2017 3:45 PM Sonja Abreu APRN [facility]        Today's Diagnoses     Sprain of neck, initial encounter    -  1    LLQ abdominal pain        Cervical dystonia           Follow-ups after your visit        Additional Services     PHYSICAL THERAPY REFERRAL       *This therapy referral will be filtered to a centralized scheduling office at [facility] and the patient will receive a call to schedule an appointment at a [place] location most convenient for them. *     [facility] provides Physical Therapy evaluation and treatment and many specialty services across the [place] system.  If requesting a specialty program, please choose from the list below.    If you have not heard from the scheduling office within 2 business days, please call [phone] for all locations, with the exception of [place], please call [phone].  Treatment: Evaluation & Treatment  Special Instructions/Modalities:   Special Programs: None    Please be aware that coverage of these services is subject to the terms and limitations of your health insurance plan.  Call member services at your health plan with any benefit or coverage questions.      **Note to Provider:  If you are referring outside of [place] for the therapy appointment, please list the name of the location in the  special instructions  above, print the referral and give to the patient to schedule the appointment.                  Your next 10 appointments already scheduled     Apr 26, 2017  5:45 PM CDT   MR ANKLE LEFT W/O CONTRAST with [id number]   [facility] ([facility])    [address] [address] [address] [phone]            Take your medicines as usual, unless your doctor tells you not to. Bring a list of your current medicines to your exam (including vitamins, minerals and over-the-counter drugs). Also bring the results of similar scans you may have had.  Please remove any body piercings and hair extensions before you arrive.  Follow your doctor s orders. If you do not, we may have to postpone your exam.  You will not have contrast for this exam. You do not need to do anything special to prepare.  The MRI machine uses a strong magnet. Please wear clothes without metal (snaps, zippers). A sweatsuit works well, or we may give you a hospital gown.   **IMPORTANT** THE INSTRUCTIONS BELOW ARE ONLY FOR THOSE PATIENTS WHO HAVE BEEN TOLD THEY WILL RECEIVE SEDATION OR GENERAL ANESTHESIA DURING THEIR MRI PROCEDURE:  IF YOU WILL RECEIVE SEDATION (take medicine to help you relax during your exam):   You must get the medicine from your doctor before you arrive. Bring the medicine to the exam. Do not take it at home.   Arrive one hour early. Bring someone who can take you home after the test. Your medicine will make you sleepy. After the exam, you may not drive, take a bus or take a taxi by yourself.   No eating 8 hours before your exam. You may have clear liquids up until 4 hours before your exam. (Clear liquids include water, clear tea, black coffee and fruit juice without pulp.)  IF YOU WILL RECEIVE ANESTHESIA (be asleep for your exam):   Arrive 1 1/2 hours early. Bring someone who can take you home after the test. You may not drive, take a bus or take a taxi by yourself.   No eating 8 hours before your exam. You may have clear liquids up until 4 hours before your exam. (Clear liquids include water, clear tea, black coffee and fruit juice without pulp.)   You will spend four to five hours in the recovery room.  Please call the Imaging Department at your exam site with any questions.            May 02, 2017 11:30 AM CDT   Return Visit with  ALISA Burt   [facility] ([facility])    [address] [id number] [phone]            May 02, 2017  2:15 PM CDT   Return Visit with Andres Johnson DPM   [facility] ([facility])    [address]  [address]  [facility] [address]   [phone]            May 09, 2017  9:30 AM CDT   Return Visit with ALISA Burt   [facility] ([facility])    [address] [id number] [phone]            May 10, 2017 11:00 AM CDT   New Visit with Natalie Cha MD   [facility] ([facility])    [address] [place] [address] [address]  [facility] [id number] [phone]            May 16, 2017  1:30 PM CDT   Return Visit with ALISA Burt   [facility] ([facility])    [address] [address] [phone]            Jun 22, 2017  1:50 PM CDT   (Arrive by 1:35 PM)   Return Botox with Frederick Bender MD   [facility] ([facility])    [address] [facility]  3rd Floor  [facility] [id number] [phone]              Future tests that were ordered for you today     Open Future Orders        Priority Expected Expires Ordered    MR Ankle Left w/o Contrast Routine  4/25/2018 4/25/2017            Who to contact     If you have questions or need follow up information about today's clinic visit or your schedule please contact [facility] directly at [phone].  Normal or non-critical lab and imaging results will be communicated to you by MyChart, letter or phone within 4 business days after the clinic has received the results. If you do not hear from us within 7 days, please contact the clinic through [facility] [employer]  "or phone. If you have a critical or abnormal lab result, we will notify you by phone as soon as possible.  Submit refill requests through [employer] or call your pharmacy and they will forward the refill request to us. Please allow 3 business days for your refill to be completed.          Additional Information About Your Visit        [place] [employer] Information     [employer] lets you send messages to your doctor, view your test results, renew your prescriptions, schedule appointments and more. To sign up, go to [email or web address] . Click on \"Log in\" on the left side of the screen, which will take you to the Welcome page. Then click on \"Sign up Now\" on the right side of the page.     You will be asked to enter the access code listed below, as well as some personal information. Please follow the directions to create your username and password.     Your access code is: [email or web address]  Expires: 2017  1:39 PM     Your access code will  in 90 days. If you need help or a new code, please call your [facility] clinic or [phone].        Care EveryWhere ID     This is your Care EveryWhere ID. This could be used by other organizations to access your [facility] medical records  [id number]        Your Vitals Were     Pulse Temperature Last Period Pulse Oximetry BMI (Body Mass Index)       96 97.8  F (36.6  C) (Oral) 2017 100% 26.57 kg/m2        Blood Pressure from Last 3 Encounters:   17 102/70   17 104/71   17 111/66    Weight from Last 3 Encounters:   17 150 lb (68 kg)   17 150 lb (68 kg)   17 150 lb (68 kg)              We Performed the Following     CBC with platelets     PHYSICAL THERAPY REFERRAL          Today's Medication Changes          These changes are accurate as of: 17  4:00 PM.  If you have any questions, ask your nurse or doctor.               Start taking these medicines.        Dose/Directions    metaxalone 800 MG tablet   Commonly known as:  SKELAXIN   Used " for:  Sprain of neck, initial encounter, Cervical dystonia   Started by:  Sonja Abreu APRN CNP        Dose:  400 mg   Take 0.5 tablets (400 mg) by mouth 3 times daily as needed for moderate pain   Quantity:  30 tablet   Refills:  1            Where to get your medicines      These medications were sent to [facility] - [place], [place] - [address], [place], [facility] [address]     Phone:  [phone]     metaxalone 800 MG tablet                Primary Care Provider Office Phone # Fax #    EVI Cardona [phone]       [facility] [address]  [facility] [address]        Thank you!     Thank you for choosing [facility]  [place] your care. Our goal is always to provide you with excellent care. Hearing back from our patients is one way we can continue to improve our services. Please take a few minutes to complete the written survey that you may receive in the mail after your visit with us. Thank you!             Your Updated Medication List - Protect others around you: Learn how to safely use, store and throw away your medicines at www.disposemymeds.org.          This list is accurate as of: 4/26/17  4:00 PM.  Always use your most recent med list.                   Brand Name Dispense Instructions for use    albuterol 108 (90 BASE) MCG/ACT Inhaler    PROAIR HFA/PROVENTIL HFA/VENTOLIN HFA    1 Inhaler    Inhale 2 puffs into the lungs every 6 hours as needed for shortness of breath / dyspnea or wheezing       amitriptyline 25 MG tablet    ELAVIL    45 tablet    Take 1 tablet (25 mg) by mouth At Bedtime       * Apremilast 10 & 20 & 30 MG Tbpk    OTEZLA    27 tablet    Take one tablet in the morning on day 1, then take one tablet every 12 hours on days 2 thru 14, according to the instructions on the packet.       * apremilast 30 MG tablet    OTEZLA    60 tablet    20 mg in AM and 30mg in PM  daily X 2 weeks and then 30mg twice daily.       ASPIRIN CHILDRENS 81 MG chewable tablet   Generic drug:  aspirin      Take 81 mg by mouth daily       betamethasone valerate 0.1 % cream    VALISONE     Apply topically 2 times daily       * botulinum toxin type A 100 UNITS injection    BOTOX    200 Units    Inject 200 Units into the muscle every 3 months       * BOTOX IJ      Inject 150 Units into the muscle once Lot: /C3 Exp: 05/2019       * BOTOX IJ      Inject 150 Units into the muscle Lot # /C3  Exp: 8/2019       * BOTOX IJ      Inject 162.5 Units as directed once Lot #: /C3 Exp: 10/2019       carbamide peroxide 6.5 % otic solution    DEBROX     5 drops 2 times daily       clobetasol 0.05 % cream    TEMOVATE    60 g    Apply topically 2 times daily       Colchicine 0.6 MG Caps     90 capsule    Take 0.6 mg by mouth See Admin Instructions 2 capsules in AM and 1 capsule in PM       dexamethasone 4 MG/ML injection    DECADRON    30 mL    Apply 1 mL (4 mg) topically as needed       dicyclomine 20 MG tablet    BENTYL    60 tablet    Take 1 tablet (20 mg) by mouth 4 times daily as needed       EPINEPHrine 0.3 MG/0.3ML injection    EPIPEN 2-EAMON    0.6 mL    Inject 0.3 mLs (0.3 mg) into the muscle as needed for anaphylaxis       EXCEDRIN MIGRAINE PO      Take by mouth as needed       guanFACINE 1 MG tablet    TENEX    180 tablet    3 tablets in the morning and 3 tablets in the evening       hyoscyamine 0.125 MG tablet    ANASPAZ/LEVSIN    40 tablet    Take 1-2 tablets (125-250 mcg) by mouth every 4 hours as needed for cramping       hypromellose 0.3 % Soln ophthalmic solution    GENTEAL     1 drop every hour as needed       LACTAID PO      Take by mouth daily       lactobacillus rhamnosus (GG) capsule          lidocaine 2 % topical gel    XYLOCAINE     Apply 1 Tube topically daily Reported on 4/21/2017       lidocaine visc 2% 2.5mL/5mL & maalox/mylanta w/ simeth 2.5mL/5mL & diphenhydrAMINE 5mg/5mL Susp  suspension    [facility] Mouthwash [facility]    1 Bottle    Swish and swallow 10 mLs in mouth every 6 hours as needed for mouth sores       linaclotide 290 MCG capsule    LINZESS    30 capsule    Take 1 capsule (290 mcg) by mouth every morning (before breakfast)       LORazepam 1 MG tablet    ATIVAN    90 tablet    Take 0.5 tablets (0.5 mg) by mouth every 6 hours as needed for other (abdominal pain) Do not operate a vehicle after taking this medication       meclizine 25 MG tablet    ANTIVERT    30 tablet    Take 1 tablet (25 mg) by mouth every 6 hours as needed for dizziness       metaxalone 800 MG tablet    SKELAXIN    30 tablet    Take 0.5 tablets (400 mg) by mouth 3 times daily as needed for moderate pain       norgestimate-ethinyl estradiol 0.25-35 MG-MCG per tablet    ORTHO-CYCLEN, SPRINTEC    84 tablet    Take 1 tablet by mouth daily       omeprazole 40 MG capsule    priLOSEC    90 capsule    Take 1 capsule (40 mg) by mouth daily       * ondansetron 4 MG ODT tab    ZOFRAN-ODT    20 tablet    Take 1 tablet (4 mg) by mouth every 6 hours as needed for nausea       * ondansetron 8 MG ODT tab    ZOFRAN-ODT    60 tablet    Take 1 tablet (8 mg) by mouth every 8 hours as needed for nausea       promethazine 25 MG tablet    PHENERGAN    20 tablet    Take 0.5-1 tablets (12.5-25 mg) by mouth every 6 hours as needed for nausea       RANITIDINE 75 PO      Take  by mouth. As needed for GI upset       sucralfate 1 GM/10ML suspension    CARAFATE    1200 mL    Take 10 mLs (1 g) by mouth 4 times daily       SUMAtriptan 100 MG tablet    IMITREX    18 tablet    Take 1 tablet (100 mg) by mouth at onset of headache for migraine May repeat in 2 hours. Max 2 tablets/24 hours.       triamcinolone 0.1 % cream    KENALOG    15 g    Apply sparingly to oral ulcers three times daily for 14 days as needed.       UNABLE TO FIND      daily Reported on 4/21/2017       VITAMIN B6 PO      Reported on 4/21/2017       vitamin D 2000 UNITS tablet      100 tablet    Take 2,000 Units by mouth daily       * Notice:  This list has 8 medication(s) that are the same as other medications prescribed for you. Read the directions carefully, and ask your doctor or other care provider to review them with you.
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
## 2017-05-01 ENCOUNTER — THERAPY VISIT (OUTPATIENT)
Dept: PHYSICAL THERAPY | Facility: CLINIC | Age: 22
End: 2017-05-01
Payer: COMMERCIAL

## 2017-05-01 DIAGNOSIS — M54.2 CERVICAL PAIN: Primary | ICD-10-CM

## 2017-05-01 PROCEDURE — G0283 ELEC STIM OTHER THAN WOUND: HCPCS | Mod: GP | Performed by: PHYSICAL THERAPIST

## 2017-05-01 PROCEDURE — 97161 PT EVAL LOW COMPLEX 20 MIN: CPT | Mod: GP | Performed by: PHYSICAL THERAPIST

## 2017-05-01 PROCEDURE — 97110 THERAPEUTIC EXERCISES: CPT | Mod: GP | Performed by: PHYSICAL THERAPIST

## 2017-05-01 NOTE — MR AVS SNAPSHOT
After Visit Summary   5/1/2017    Samara Oropeza    MRN: [id number]           Patient Information     Date Of Birth          1995        Visit Information        Provider Department      5/1/2017 2:40 PM Brittny James PT [facility] Physical [facility]        Today's Diagnoses     Cervical pain    -  1       Follow-ups after your visit        Your next 10 appointments already scheduled     May 02, 2017 11:30 AM CDT   Return Visit with ALISA Burt   [facility] ([facility])    [address] [id number] [phone]            May 02, 2017  2:15 PM CDT   Return Visit with Andres Johnson DPM   [facility] ([facility])    [address] [address]   [phone]            May 09, 2017  9:30 AM CDT   Return Visit with ALISA Burt   [facility] ([facility])    [address] [id number] [phone]            May 09, 2017  2:40 PM CDT   LUIZ Spine with Cristy Pineda PTA   [facility] ([facility]  )    [address] [place] [id number] [phone]            May 10, 2017 11:00 AM CDT   New Visit with Natalie Cha MD   [facility] ([facility])    [address] [place] [address] [phone]            May 16, 2017  1:30 PM CDT   Return Visit with ALISA Burt   [facility] ([facility])    [address] [phone]            May 19, 2017  2:40 PM CDT   LUIZ Spine with Cristy Pineda PTA   [facility] ([facility]  )    [address] [place] [address]  "[address]  [facility] [address]   [phone]            2017  1:50 PM CDT   (Arrive by 1:35 PM)   Return Botox with Frederick Bender MD   [facility] ([facility])    [address] [facility]  3rd [facility] [id number] [phone]              Who to contact     If you have questions or need follow up information about today's clinic visit or your schedule please contact [facility] PHYSICAL THERAPY directly at [phone].  Normal or non-critical lab and imaging results will be communicated to you by [employer], letter or phone within 4 business days after the clinic has received the results. If you do not hear from us within 7 days, please contact the clinic through [employer] or phone. If you have a critical or abnormal lab result, we will notify you by phone as soon as possible.  Submit refill requests through [employer] or call your pharmacy and they will forward the refill request to us. Please allow 3 business days for your refill to be completed.          Additional Information About Your Visit        MyChart Information     [employer] lets you send messages to your doctor, view your test results, renew your prescriptions, schedule appointments and more. To sign up, go to [email or web address] . Click on \"Log in\" on the left side of the screen, which will take you to the Welcome page. Then click on \"Sign up Now\" on the right side of the page.     You will be asked to enter the access code listed below, as well as some personal information. Please follow the directions to create your username and password.     Your access code is: [email or web address]  Expires: 2017  1:39 PM     Your access code will  in 90 days. If you need help or a new code, please call your [facility] or [phone].        Care EveryWhere ID     This is your Care EveryWhere ID. This could be used by other organizations to access " your [place] [place] medical records  [id number]        Your Vitals Were     Last Period                   04/07/2017            Blood Pressure from Last 3 Encounters:   04/26/17 102/70   04/24/17 104/71   04/21/17 111/66    Weight from Last 3 Encounters:   04/26/17 68 kg (150 lb)   04/25/17 68 kg (150 lb)   04/24/17 68 kg (150 lb)              We Performed the Following     Electric Stim Unattended     LUZI Inital Eval Report     PT Eval, Low Complexity (63466)     Therapeutic Exercises        Primary Care Provider Office Phone # Fax #    Sonja EVI Laguerre [phone]       [facility] [address] [facility] [address]  [employer] [address]        Thank you!     Thank you for choosing [facility] PHYSICAL [facility]ERAP[facility]  for your care. Our goal is always to provide you with excellent care. Hearing back from our patients is one way we can continue to improve our services. Please take a few minutes to complete the written survey that you may receive in the mail after your visit with us. Thank you!             Your Updated Medication List - Protect others around you: Learn how to safely use, store and throw away your medicines at www.disposemymeds.org.          This list is accurate as of: 5/1/17 11:59 PM.  Always use your most recent med list.                   Brand Name Dispense Instructions for use    albuterol 108 (90 BASE) MCG/ACT Inhaler    PROAIR HFA/PROVENTIL HFA/VENTOLIN HFA    1 Inhaler    Inhale 2 puffs into the lungs every 6 hours as needed for shortness of breath / dyspnea or wheezing       amitriptyline 25 MG tablet    ELAVIL    45 tablet    Take 1 tablet (25 mg) by mouth At Bedtime       * Apremilast 10 & 20 & 30 MG Tbpk    OTEZLA    27 tablet    Take one tablet in the morning on day 1, then take one tablet every 12 hours on days 2 thru 14, according to the instructions on the packet.       * apremilast 30 MG tablet    OTEZLA    60 tablet    20 mg in AM and  30mg in PM daily X 2 weeks and then 30mg twice daily.       ASPIRIN CHILDRENS 81 MG chewable tablet   Generic drug:  aspirin      Take 81 mg by mouth daily       betamethasone valerate 0.1 % cream    VALISONE     Apply topically 2 times daily       * botulinum toxin type A 100 UNITS injection    BOTOX    200 Units    Inject 200 Units into the muscle every 3 months       * BOTOX IJ      Inject 150 Units into the muscle once Lot: /C3 Exp: 05/2019       * BOTOX IJ      Inject 150 Units into the muscle Lot # /C3  Exp: 8/2019       * BOTOX IJ      Inject 162.5 Units as directed once Lot #: /C3 Exp: 10/2019       carbamide peroxide 6.5 % otic solution    DEBROX     5 drops 2 times daily       clobetasol 0.05 % cream    TEMOVATE    60 g    Apply topically 2 times daily       Colchicine 0.6 MG Caps     90 capsule    Take 0.6 mg by mouth See Admin Instructions 2 capsules in AM and 1 capsule in PM       dexamethasone 4 MG/ML injection    DECADRON    30 mL    Apply 1 mL (4 mg) topically as needed       dicyclomine 20 MG tablet    BENTYL    60 tablet    Take 1 tablet (20 mg) by mouth 4 times daily as needed       EPINEPHrine 0.3 MG/0.3ML injection    EPIPEN 2-EAMON    0.6 mL    Inject 0.3 mLs (0.3 mg) into the muscle as needed for anaphylaxis       EXCEDRIN MIGRAINE PO      Take by mouth as needed       guanFACINE 1 MG tablet    TENEX    180 tablet    3 tablets in the morning and 3 tablets in the evening       hyoscyamine 0.125 MG tablet    ANASPAZ/LEVSIN    40 tablet    Take 1-2 tablets (125-250 mcg) by mouth every 4 hours as needed for cramping       hypromellose 0.3 % Soln ophthalmic solution    GENTEAL     1 drop every hour as needed       LACTAID PO      Take by mouth daily       lactobacillus rhamnosus (GG) capsule          lidocaine 2 % topical gel    XYLOCAINE     Apply 1 Tube topically daily Reported on 4/21/2017       lidocaine visc 2% 2.5mL/5mL & maalox/mylanta w/ simeth 2.5mL/5mL & diphenhydrAMINE  5mg/5mL Susp suspension    [facility]    1 Bottle    Swish and swallow 10 mLs in mouth every 6 hours as needed for mouth sores       linaclotide 290 MCG capsule    LINZESS    30 capsule    Take 1 capsule (290 mcg) by mouth every morning (before breakfast)       LORazepam 1 MG tablet    ATIVAN    90 tablet    Take 0.5 tablets (0.5 mg) by mouth every 6 hours as needed for other (abdominal pain) Do not operate a vehicle after taking this medication       meclizine 25 MG tablet    ANTIVERT    30 tablet    Take 1 tablet (25 mg) by mouth every 6 hours as needed for dizziness       metaxalone 800 MG tablet    SKELAXIN    30 tablet    Take 0.5 tablets (400 mg) by mouth 3 times daily as needed for moderate pain       norgestimate-ethinyl estradiol 0.25-35 MG-MCG per tablet    ORTHO-CYCLEN, SPRINTEC    84 tablet    Take 1 tablet by mouth daily       omeprazole 40 MG capsule    priLOSEC    90 capsule    Take 1 capsule (40 mg) by mouth daily       * ondansetron 4 MG ODT tab    ZOFRAN-ODT    20 tablet    Take 1 tablet (4 mg) by mouth every 6 hours as needed for nausea       * ondansetron 8 MG ODT tab    ZOFRAN-ODT    60 tablet    Take 1 tablet (8 mg) by mouth every 8 hours as needed for nausea       promethazine 25 MG tablet    PHENERGAN    20 tablet    Take 0.5-1 tablets (12.5-25 mg) by mouth every 6 hours as needed for nausea       RANITIDINE 75 PO      Take  by mouth. As needed for GI upset       sucralfate 1 GM/10ML suspension    CARAFATE    1200 mL    Take 10 mLs (1 g) by mouth 4 times daily       SUMAtriptan 100 MG tablet    IMITREX    18 tablet    Take 1 tablet (100 mg) by mouth at onset of headache for migraine May repeat in 2 hours. Max 2 tablets/24 hours.       triamcinolone 0.1 % cream    KENALOG    15 g    Apply sparingly to oral ulcers three times daily for 14 days as needed.       UNABLE TO FIND      daily Reported on 4/21/2017       VITAMIN B6 PO      Reported on 4/21/2017       vitamin D 2000 UNITS  tablet     100 tablet    Take 2,000 Units by mouth daily       * Notice:  This list has 8 medication(s) that are the same as other medications prescribed for you. Read the directions carefully, and ask your doctor or other care provider to review them with you.

## 2017-05-02 ENCOUNTER — TELEPHONE (OUTPATIENT)
Dept: GASTROENTEROLOGY | Facility: CLINIC | Age: 22
End: 2017-05-02

## 2017-05-02 ENCOUNTER — OFFICE VISIT (OUTPATIENT)
Dept: PODIATRY | Facility: CLINIC | Age: 22
End: 2017-05-02
Payer: COMMERCIAL

## 2017-05-02 ENCOUNTER — OFFICE VISIT (OUTPATIENT)
Dept: BEHAVIORAL HEALTH | Facility: CLINIC | Age: 22
End: 2017-05-02
Payer: COMMERCIAL

## 2017-05-02 VITALS
DIASTOLIC BLOOD PRESSURE: 70 MMHG | SYSTOLIC BLOOD PRESSURE: 110 MMHG | BODY MASS INDEX: 26.58 KG/M2 | HEIGHT: 63 IN | WEIGHT: 150 LBS

## 2017-05-02 DIAGNOSIS — F33.1 MODERATE EPISODE OF RECURRENT MAJOR DEPRESSIVE DISORDER (H): Primary | ICD-10-CM

## 2017-05-02 DIAGNOSIS — F43.10 PTSD (POST-TRAUMATIC STRESS DISORDER): ICD-10-CM

## 2017-05-02 DIAGNOSIS — S93.492D SPRAIN OF OTHER LIGAMENT OF LEFT ANKLE, SUBSEQUENT ENCOUNTER: ICD-10-CM

## 2017-05-02 DIAGNOSIS — M25.572 ACUTE LEFT ANKLE PAIN: Primary | ICD-10-CM

## 2017-05-02 DIAGNOSIS — F41.1 GAD (GENERALIZED ANXIETY DISORDER): ICD-10-CM

## 2017-05-02 DIAGNOSIS — M35.2 BEHCET'S DISEASE (H): ICD-10-CM

## 2017-05-02 PROBLEM — M54.2 CERVICAL PAIN: Status: ACTIVE | Noted: 2017-05-02

## 2017-05-02 PROCEDURE — 20610 DRAIN/INJ JOINT/BURSA W/O US: CPT | Mod: LT | Performed by: PODIATRIST

## 2017-05-02 PROCEDURE — 99213 OFFICE O/P EST LOW 20 MIN: CPT | Mod: 25 | Performed by: PODIATRIST

## 2017-05-02 PROCEDURE — 90834 PSYTX W PT 45 MINUTES: CPT | Performed by: SOCIAL WORKER
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
## 2017-05-02 NOTE — NURSING NOTE
"Chief Complaint   Patient presents with     RECHECK     MRI results       Initial /70  Ht 5' 3\" (1.6 m)  Wt 150 lb (68 kg)  LMP 04/07/2017  BMI 26.57 kg/m2 Estimated body mass index is 26.57 kg/(m^2) as calculated from the following:    Height as of this encounter: 5' 3\" (1.6 m).    Weight as of this encounter: 150 lb (68 kg).  Medication Reconciliation: complete  "

## 2017-05-02 NOTE — TELEPHONE ENCOUNTER
Darnell javier [facility] calling for Kacie Almeida.  On Zofran 8 mg dissolvable tabs one TID.  INSURANCE WILL ONLY GIVE THEM 18 TABS EVERY 21 DAYS. Needs quantity override.  On omeprazole, needs PA for that.    Will route to Kacie Ramirez's clinic nurse.

## 2017-05-02 NOTE — MR AVS SNAPSHOT
MRN:[id number]                      After Visit Summary   5/2/2017    Samara Oropeza    MRN: [id number]           Visit Information        Provider Department      5/2/2017 11:30 AM Layla Browne LICSW [facility] Generic      Your next 10 appointments already scheduled     May 02, 2017  2:15 PM CDT   Return Visit with Andres Johnson DPM   [facility] [place] [facility] ([facility] [place])    [address]  [address]  [facility] [address]   [phone]            May 09, 2017  9:30 AM CDT   [facility]   [facility] ([facility])    [address] [phone]            May 09, 2017  2:40 PM CDT   LUIZ Spine with Cristy Pineda PTA   [facility] Athletic Medicine [facility] Physical Therapy ([facility]  )    [address] [place] [address] [phone]            May 10, 2017 11:00 AM CDT   New Visit with Natalie Cha MD   [facility] ([facility])    [address] [place] [address] [phone]            May 16, 2017  1:30 PM CDT   Return Visit with ALISA Burt   [facility] ([facility])    [address] [phone]            May 19, 2017  2:40 PM CDT   LUIZ Spine with Cristy Pineda PTA   [facility] ([facility]  )    [address] [place] [address] [phone]            Jun 22, 2017  1:50 PM CDT   (Arrive by 1:35 PM)   Return Botox with Frederick Bender MD   [facility] ([facility])    [address] [facility]  3rd [facility] [address] [phone]              [facility]  "Information     [employer] lets you send messages to your doctor, view your test results, renew your prescriptions, schedule appointments and more. To sign up, go to [email or web address] . Click on \"Log in\" on the left side of the screen, which will take you to the Welcome page. Then click on \"Sign up Now\" on the right side of the page.     You will be asked to enter the access code listed below, as well as some personal information. Please follow the directions to create your username and password.     Your access code is: [email or web address]  Expires: 2017  1:39 PM     Your access code will  in 90 days. If you need help or a new code, please call your [facility] clinic or [phone].        Care EveryWhere ID     This is your Care EveryWhere ID. This could be used by other organizations to access your [facility] medical records  [id number]        "

## 2017-05-02 NOTE — Clinical Note
Good morning  I saw Samara on Tuesday for continued monitoring of left ankle pain, and to review the MRI.  Fortunately, no ligament damage was noted on imaging.  She underwent a diagnostic/therapeutic left ankle injection, and will follow up prn.  Next step treatment depends on response to the injection.  Thanks  Andres
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
## 2017-05-02 NOTE — PROGRESS NOTES
"Foot & Ankle Surgery   May 2, 2017    S:  Pt is seen today for evaluation of L ankle pain.  Minimal improvement thus far with the brace and PT, continues to swell and be painful.    Vitals:    05/02/17 1414   BP: 110/70   Weight: 150 lb (68 kg)   Height: 5' 3\" (1.6 m)   '      ROS - Pos for CC.  Patient denies current nausea, vomiting, chills, fevers, belly pain, calf pain, chest pain or SOB.  Complete remainder of ROS it otherwise neg.      PE:  Gen:   No apparent distress  Neuro:   A&Ox3, no deficits  Psych:    Answering questions appropriately for age and situation with normal affect  Head:    NCAT  Eye:    Visual scanning without deficit  Ear:    Response to auditory stimuli wnl  Lung:    Non-labored breathing on RA noted  Abd:    NTND per patient report  Lymph:    Neg for pitting/non-pitting edema BLE  Vasc:    Pulses palpable, CFT minimally delayed  Neuro:    Light touch sensation intact to all sensory nerve distributions without paresthesias  Derm:    Neg for nodules, lesions or ulcerations  MSK:    Very tender over ATFL and medial soft spot/anterior gutter  Calf:    Neg for redness, swelling or tenderness      Imaging:  MRI L ankle - IMPRESSION: Mild strain of the distal soleus muscle. No actual muscle  or tendon tear is seen. No ligament pathology is demonstrated.    Assessment:  21 year old female with L ankle pain after previous sprain      Plan:  Discussed etiologies/options  1.  L ankle injury  -personally reviewed imaging with patient  -RICE/NSAID prn  -continue triloc brace and finish PT  -ankle injection, see procedure note.  Monitor %, location, duration; consider arthroscopy as next step.    Procedure - After obtaining written consent, the skin was prepped with alcohol and betadine.  The needle was advance to the underlying left ankle joint.  1 1/2cc mixture of 2:1 kenalog 40:2% lidocaine plain was injected.  The patient tolerated the procedure without complication.  Risks that were discussed " included possible joint/soft tissue damage, neuritis/numbness, infection, pigment change, steroid flare.      Follow up:  Prn base on injection results    Billing today is based on time > 15 minutes, more than 50% spent counseling and coordinating cares, excluding procedure time        Body mass index is 26.57 kg/(m^2).  Weight management plan: Patient was referred to their PCP to discuss a diet and exercise plan.         Andres Johnson DPM   Podiatric Foot & Ankle Surgeon  [facility]  [phone]

## 2017-05-02 NOTE — MR AVS SNAPSHOT
After Visit Summary   5/2/2017    Samara Oropeza    MRN: [id number]           Patient Information     Date Of Birth          1995        Visit Information        Provider Department      5/2/2017 2:15 PM Andres Johnson DPM [facility]        Today's Diagnoses     Acute left ankle pain    -  1    Sprain of other ligament of left ankle, subsequent encounter           Follow-ups after your visit        Your next 10 appointments already scheduled     May 09, 2017  9:30 AM CDT   Return Visit with ALISA Burt   [facility] ([facility])    [address] [address]   [phone]            May 09, 2017  2:40 PM CDT   LUIZ Spine with Cristy Pineda PTA   [facility] ([facility]  )    [address] [place] [id number] [phone]            May 10, 2017 11:00 AM CDT   New Visit with Natalie Cha MD   [facility] ([facility])    [address] [place] [address]   [phone]            May 16, 2017  1:30 PM CDT   Return Visit with ALISA Burt   [facility] ([facility])    [address] [address]   [phone]            May 17, 2017 10:00 AM CDT   Return Visit with Austen Marquez MD   [facility] ([facility])    [address] [place] [address]   [phone]            May 19, 2017  2:40 PM CDT   LUIZ Spine with Cristy Pineda PTA   [facility] ([facility]  )    [address] [place] [id number] [phone]            Jun 22, 2017  1:50 PM CDT   (Arrive by 1:35 PM)   Return Botox with Frederick Bender MD   [facility]  "Rehabilitation ([facility])    [address] [facility]  3rd Floor  [facility] [address] [phone]              Who to contact     If you have questions or need follow up information about today's clinic visit or your schedule please contact [facility] directly at [phone].  Normal or non-critical lab and imaging results will be communicated to you by MyChart, letter or phone within 4 business days after the clinic has received the results. If you do not hear from us within 7 days, please contact the clinic through MyChart or phone. If you have a critical or abnormal lab result, we will notify you by phone as soon as possible.  Submit refill requests through retsCloud or call your pharmacy and they will forward the refill request to us. Please allow 3 business days for your refill to be completed.          Additional Information About Your Visit        [employer] Information     retsCloud lets you send messages to your doctor, view your test results, renew your prescriptions, schedule appointments and more. To sign up, go to [email or web address] . Click on \"Log in\" on the left side of the screen, which will take you to the Welcome page. Then click on \"Sign up Now\" on the right side of the page.     You will be asked to enter the access code listed below, as well as some personal information. Please follow the directions to create your username and password.     Your access code is: [email or web address]  Expires: 2017  1:39 PM     Your access code will  in 90 days. If you need help or a new code, please call your [facility] clinic or [phone].        Care EveryWhere ID     This is your Care EveryWhere ID. This could be used by other organizations to access your [facility] medical records  [id number]        Your Vitals Were     Height Last Period BMI (Body Mass Index)             5' 3\" (1.6 m) 2017 26.57 kg/m2          Blood Pressure from Last 3 Encounters: "   05/02/17 110/70   04/26/17 102/70   04/24/17 104/71    Weight from Last 3 Encounters:   05/02/17 150 lb (68 kg)   04/26/17 150 lb (68 kg)   04/25/17 150 lb (68 kg)              We Performed the Following     DRAIN/INJECT LARGE JOINT/BURSA          Today's Medication Changes          These changes are accurate as of: 5/2/17 11:59 PM.  If you have any questions, ask your nurse or doctor.               Start taking these medicines.        Dose/Directions    triamcinolone acetonide 40 MG/ML injection   Commonly known as:  KENALOG-40   Used for:  Acute left ankle pain, Sprain of other ligament of left ankle, subsequent encounter   Started by:  Andres Johnson DPM        Dose:  40 mg   1 mL (40 mg) by INTRA-ARTICULAR route once for 1 dose   Quantity:  1 mL   Refills:  0            Where to get your medicines      Some of these will need a paper prescription and others can be bought over the counter.  Ask your nurse if you have questions.     You don't need a prescription for these medications     triamcinolone acetonide 40 MG/ML injection                Primary Care Provider Office Phone # Fax #    [facility] [phone]       [facility] [address] [place] [id number]        Thank you!     Thank you for choosing [facility]  for your care. Our goal is always to provide you with excellent care. Hearing back from our patients is one way we can continue to improve our services. Please take a few minutes to complete the written survey that you may receive in the mail after your visit with us. Thank you!             Your Updated Medication List - Protect others around you: Learn how to safely use, store and throw away your medicines at www.disposemymeds.org.          This list is accurate as of: 5/2/17 11:59 PM.  Always use your most recent med list.                   Brand Name Dispense Instructions for use    albuterol 108 (90 BASE) MCG/ACT Inhaler     PROAIR HFA/PROVENTIL HFA/VENTOLIN HFA    1 Inhaler    Inhale 2 puffs into the lungs every 6 hours as needed for shortness of breath / dyspnea or wheezing       amitriptyline 25 MG tablet    ELAVIL    45 tablet    Take 1 tablet (25 mg) by mouth At Bedtime       * Apremilast 10 & 20 & 30 MG Tbpk    OTEZLA    27 tablet    Take one tablet in the morning on day 1, then take one tablet every 12 hours on days 2 thru 14, according to the instructions on the packet.       * apremilast 30 MG tablet    OTEZLA    60 tablet    20 mg in AM and 30mg in PM daily X 2 weeks and then 30mg twice daily.       ASPIRIN CHILDRENS 81 MG chewable tablet   Generic drug:  aspirin      Take 81 mg by mouth daily       betamethasone valerate 0.1 % cream    VALISONE     Apply topically 2 times daily       * botulinum toxin type A 100 UNITS injection    BOTOX    200 Units    Inject 200 Units into the muscle every 3 months       * BOTOX IJ      Inject 150 Units into the muscle once Lot: /C3 Exp: 05/2019       * BOTOX IJ      Inject 150 Units into the muscle Lot # /C3  Exp: 8/2019       * BOTOX IJ      Inject 162.5 Units as directed once Lot #: /C3 Exp: 10/2019       carbamide peroxide 6.5 % otic solution    DEBROX     5 drops 2 times daily       clobetasol 0.05 % cream    TEMOVATE    60 g    Apply topically 2 times daily       Colchicine 0.6 MG Caps     90 capsule    Take 0.6 mg by mouth See Admin Instructions 2 capsules in AM and 1 capsule in PM       dexamethasone 4 MG/ML injection    DECADRON    30 mL    Apply 1 mL (4 mg) topically as needed       dicyclomine 20 MG tablet    BENTYL    60 tablet    Take 1 tablet (20 mg) by mouth 4 times daily as needed       EPINEPHrine 0.3 MG/0.3ML injection    EPIPEN 2-EAMON    0.6 mL    Inject 0.3 mLs (0.3 mg) into the muscle as needed for anaphylaxis       EXCEDRIN MIGRAINE PO      Take by mouth as needed       guanFACINE 1 MG tablet    TENEX    180 tablet    3 tablets in the morning and 3  tablets in the evening       hyoscyamine 0.125 MG tablet    ANASPAZ/LEVSIN    40 tablet    Take 1-2 tablets (125-250 mcg) by mouth every 4 hours as needed for cramping       hypromellose 0.3 % Soln ophthalmic solution    GENTEAL     1 drop every hour as needed       LACTAID PO      Take by mouth daily       lactobacillus rhamnosus (GG) capsule          lidocaine 2 % topical gel    XYLOCAINE     Apply 1 Tube topically daily Reported on 4/21/2017       lidocaine visc 2% 2.5mL/5mL & maalox/mylanta w/ simeth 2.5mL/5mL & diphenhydrAMINE 5mg/5mL Susp suspension    [facility]    1 Bottle    Swish and swallow 10 mLs in mouth every 6 hours as needed for mouth sores       linaclotide 290 MCG capsule    LINZESS    30 capsule    Take 1 capsule (290 mcg) by mouth every morning (before breakfast)       LORazepam 1 MG tablet    ATIVAN    90 tablet    Take 0.5 tablets (0.5 mg) by mouth every 6 hours as needed for other (abdominal pain) Do not operate a vehicle after taking this medication       meclizine 25 MG tablet    ANTIVERT    30 tablet    Take 1 tablet (25 mg) by mouth every 6 hours as needed for dizziness       metaxalone 800 MG tablet    SKELAXIN    30 tablet    Take 0.5 tablets (400 mg) by mouth 3 times daily as needed for moderate pain       norgestimate-ethinyl estradiol 0.25-35 MG-MCG per tablet    ORTHO-CYCLEN, SPRINTEC    84 tablet    Take 1 tablet by mouth daily       omeprazole 40 MG capsule    priLOSEC    90 capsule    Take 1 capsule (40 mg) by mouth daily       * ondansetron 4 MG ODT tab    ZOFRAN-ODT    20 tablet    Take 1 tablet (4 mg) by mouth every 6 hours as needed for nausea       * ondansetron 8 MG ODT tab    ZOFRAN-ODT    60 tablet    Take 1 tablet (8 mg) by mouth every 8 hours as needed for nausea       promethazine 25 MG tablet    PHENERGAN    20 tablet    Take 0.5-1 tablets (12.5-25 mg) by mouth every 6 hours as needed for nausea       RANITIDINE 75 PO      Take  by mouth. As needed for GI  upset       sucralfate 1 GM/10ML suspension    CARAFATE    1200 mL    Take 10 mLs (1 g) by mouth 4 times daily       SUMAtriptan 100 MG tablet    IMITREX    18 tablet    Take 1 tablet (100 mg) by mouth at onset of headache for migraine May repeat in 2 hours. Max 2 tablets/24 hours.       triamcinolone 0.1 % cream    KENALOG    15 g    Apply sparingly to oral ulcers three times daily for 14 days as needed.       triamcinolone acetonide 40 MG/ML injection    KENALOG-40    1 mL    1 mL (40 mg) by INTRA-ARTICULAR route once for 1 dose       UNABLE TO FIND      daily Reported on 4/21/2017       VITAMIN B6 PO      Reported on 4/21/2017       vitamin D 2000 UNITS tablet     100 tablet    Take 2,000 Units by mouth daily       * Notice:  This list has 8 medication(s) that are the same as other medications prescribed for you. Read the directions carefully, and ask your doctor or other care provider to review them with you.

## 2017-05-02 NOTE — PROGRESS NOTES
Subjective:    Patient is a 21 year old female presenting with rehab left ankle/foot hpi.                                      Pertinent medical history includes:  Rheumatoid arthritis, depression, fibromyalgia, sleep disorder/apnea, migraines and other.  Medical allergies: yes.  Other surgeries include:  Orthopedic surgery and other.  Current medications:  Muscle relaxants and anti-inflammatory.      Primary job tasks include:  Prolonged standing, lifting, repetitive tasks and other (Stairs).                                Objective:    System    Physical Exam    General     ROS    Assessment/Plan:

## 2017-05-03 ENCOUNTER — TELEPHONE (OUTPATIENT)
Dept: GASTROENTEROLOGY | Facility: CLINIC | Age: 22
End: 2017-05-03

## 2017-05-03 NOTE — TELEPHONE ENCOUNTER
Prior Authorization Retail Medication Request  Medication/Dose: Zofran 1 mg TID Quantity override  Diagnosis and ICD code: IBS  New/Renewal/Insurance Change PA: Change / Quantity Override  Previously Tried and Failed Therapies:     Insurance ID (if provided):  Insurance Phone (if provided):     Any additional info from fax request:     If you received a fax notification from an outside Pharmacy:  Pharmacy Name:[facility]  Pharmacy Phone [phone]  Pharmacy Fax:

## 2017-05-04 RX ORDER — TRIAMCINOLONE ACETONIDE 40 MG/ML
40 INJECTION, SUSPENSION INTRA-ARTICULAR; INTRAMUSCULAR ONCE
Qty: 1 ML | Refills: 0 | OUTPATIENT
Start: 2017-05-04 | End: 2017-05-04

## 2017-05-04 NOTE — TELEPHONE ENCOUNTER
[facility] Prior Authorization Team   Phone: [phone]  Fax: [phone]    Prior Authorization Not Needed per Insurance    Medication: omeprazole (PRILOSEC) 40 MG capsule  Insurance Company: MEDICA - Phone [phone] Fax [phone]  Expected CoPay:      Pharmacy Filling the Rx: [facility] [address]

## 2017-05-04 NOTE — TELEPHONE ENCOUNTER
[facility] Prior Authorization Team   Phone: [phone]  Fax: [phone]      PA Initiation    Medication: Zofran 1 mg TID Q;uanity override  Insurance Company: MEDICA - Phone [phone] Fax [phone]  Pharmacy Filling the Rx: [id number] [place] [address]  Filling Pharmacy Phone: [phone]  Filling Pharmacy Fax: [phone]  Start Date: 5/4/2017

## 2017-05-05 ENCOUNTER — TELEPHONE (OUTPATIENT)
Dept: GASTROENTEROLOGY | Facility: CLINIC | Age: 22
End: 2017-05-05

## 2017-05-05 NOTE — TELEPHONE ENCOUNTER
PRIOR AUTHORIZATION DENIED    Medication: Zofran 1 mg TID Q;uanity override - DENIED    Denial Date: 5/4/2017    Denial Rational: PA has been denied as pt does not meet one of these conditions: has hyperemesis gravidarum, is undergoing radiation therapy, or undergoing moderate to highly emetogenic chemotherapy. An appeal is available and will require a letter of medical necessity, please let the PA team know whether one has been written.        Appeal Information:

## 2017-05-05 NOTE — TELEPHONE ENCOUNTER
Prior Authorization Retail Medication Request  Medication/Dose: Omeprazole 40mg  Diagnosis and ICD code: IBS  New/Renewal/Insurance Change PA: Renewal  Previously Tried and Failed Therapies:     Insurance ID (if provided):   Insurance Phone (if provided):     Any additional info from fax request:     If you received a fax notification from an outside Pharmacy:  Pharmacy Name [facility]  Pharmacy # [phone]  Pharmacy Fax:[phone]

## 2017-05-08 ENCOUNTER — TELEPHONE (OUTPATIENT)
Dept: GASTROENTEROLOGY | Facility: CLINIC | Age: 22
End: 2017-05-08

## 2017-05-08 NOTE — TELEPHONE ENCOUNTER
Quantity Limit Initiation - By Phone - Will document when response is received.     Medication: omeprazole (PRILOSEC) 40 MG capsule  Insurance Company: Super - Phone [phone] Fax [phone]

## 2017-05-08 NOTE — TELEPHONE ENCOUNTER
PLEASE SEE TELEPHONE ENCOUNTER 05/02/2017.     Quantity Limit Initiation - By Phone - Will document when response is received.     Medication: omeprazole (PRILOSEC) 40 MG capsule  Insurance Company: [employer] - Phone [phone] Fax [phone]      [facility] Prior Authorization Team   Phone: [phone]  Fax: [phone]

## 2017-05-09 ENCOUNTER — THERAPY VISIT (OUTPATIENT)
Dept: PHYSICAL THERAPY | Facility: CLINIC | Age: 22
End: 2017-05-09
Payer: COMMERCIAL

## 2017-05-09 ENCOUNTER — OFFICE VISIT (OUTPATIENT)
Dept: BEHAVIORAL HEALTH | Facility: CLINIC | Age: 22
End: 2017-05-09
Payer: COMMERCIAL

## 2017-05-09 DIAGNOSIS — F43.10 PTSD (POST-TRAUMATIC STRESS DISORDER): ICD-10-CM

## 2017-05-09 DIAGNOSIS — M35.2 BEHCET'S DISEASE (H): ICD-10-CM

## 2017-05-09 DIAGNOSIS — F33.1 MODERATE EPISODE OF RECURRENT MAJOR DEPRESSIVE DISORDER (H): Primary | ICD-10-CM

## 2017-05-09 DIAGNOSIS — F41.1 GAD (GENERALIZED ANXIETY DISORDER): ICD-10-CM

## 2017-05-09 DIAGNOSIS — M54.2 CERVICAL PAIN: ICD-10-CM

## 2017-05-09 PROCEDURE — 97140 MANUAL THERAPY 1/> REGIONS: CPT | Mod: GP

## 2017-05-09 PROCEDURE — 97112 NEUROMUSCULAR REEDUCATION: CPT | Mod: GP

## 2017-05-09 PROCEDURE — 97110 THERAPEUTIC EXERCISES: CPT | Mod: GP

## 2017-05-09 PROCEDURE — 90834 PSYTX W PT 45 MINUTES: CPT | Performed by: SOCIAL WORKER

## 2017-05-09 ASSESSMENT — ANXIETY QUESTIONNAIRES
1. FEELING NERVOUS, ANXIOUS, OR ON EDGE: NEARLY EVERY DAY
6. BECOMING EASILY ANNOYED OR IRRITABLE: NEARLY EVERY DAY
GAD7 TOTAL SCORE: 17
3. WORRYING TOO MUCH ABOUT DIFFERENT THINGS: NEARLY EVERY DAY
5. BEING SO RESTLESS THAT IT IS HARD TO SIT STILL: MORE THAN HALF THE DAYS
7. FEELING AFRAID AS IF SOMETHING AWFUL MIGHT HAPPEN: SEVERAL DAYS
2. NOT BEING ABLE TO STOP OR CONTROL WORRYING: MORE THAN HALF THE DAYS

## 2017-05-09 ASSESSMENT — PATIENT HEALTH QUESTIONNAIRE - PHQ9: 5. POOR APPETITE OR OVEREATING: NEARLY EVERY DAY

## 2017-05-09 NOTE — PROGRESS NOTES
Progress Note    Client Name: Samara Oropeza  Date: 5/9/2017            Service Type: Individual      Session Start Time: 10:40  Session End Time: 11:30      Session Length: 45     Session #: 9     Attendees: Client attended alone    Treatment Plan Last Reviewed: 3/14/2017   PHQ-9 / TAHIR-7 : 5/9/2017      DATA      Progress Since Last Session (Related to Symptoms / Goals / Homework):   Symptoms: Stable    Homework: Partially completed- has been working on all of the tasks- continues to struggle with them helping with her symptoms      Episode of Care Goals: Satisfactory progress - ACTION (Actively working towards change); Intervened by reinforcing change plan / affirming steps taken     Current / Ongoing Stressors and Concerns:   Client reports having vivid dreams and awake for 48 hours.   Client processed through her current medical concerns- many of her symptoms are aggravated.  She continues to have challenges with  eating.  Processed through her skills utilized to manage the depression and anxiety, struggles with their use as her symptoms can be overwhelming.  Described some episodes of hallucinations-  afraid of being alone. Client also reports that she may also be experiencing seizures.  Discussed meeting with a psychiatrist.     Treatment Objective(s) Addressed in This Session:   use cognitive strategies identified in therapy to challenge anxious thoughts  Increase restful sleep     Intervention:   CBT: refocus on self-care        ASSESSMENT: Current Emotional / Mental Status (status of significant symptoms):   Risk status (Self / Other harm or suicidal ideation)   Client denies current fears or concerns for personal safety.   Client denies current or recent suicidal ideation or behaviors.   Client denies current or recent homicidal ideation or behaviors.   Client denies current or recent self injurious behavior or ideation.   Client denies other safety  concerns.   A safety and risk management plan has not been developed at this time, however client was given the after-hours number / 911 should there be a change in any of these risk factors.     Appearance:   Appropriate    Eye Contact:   Fair    Psychomotor Behavior: Normal    Attitude:   Cooperative    Orientation:   All   Speech    Rate / Production: Monotone     Volume:  Soft    Mood:    Anxious    Affect:    Flat    Thought Content:  Clear    Thought Form:  Coherent  Logical    Insight:    Fair      Medication Review:   No current psychiatric medications prescribed     Medication Compliance:   NA     Changes in Health Issues:   Yes: Pain, Associated Psychological Distress     Chemical Use Review:   Substance Use: Chemical use reviewed, no active concerns identified      Tobacco Use: No current tobacco use.       Collateral Reports Completed:   Not Applicable    PLAN: (Client Tasks / Therapist Tasks / Other)  Homework:      1. Watch her thinking- reinforce her values and not her negative thinking patterns around family stress    2.  Keep using the mindfulness for sleep (13 minute Body Scan) from website:  [email or web address]    3.  Keep focusing on healthy eating small amount and more frequent versus one meal a day. (discussed foods to try).    4.  Talk to PMD about psychiatry referral.          Layla Browne, [facility]                                                         ________________________________________________________________________    Treatment Plan    Client's Name: Samara Oropeza  YOB: 1995    Date: 3/14/2017     DSM-V Diagnoses: 296.32 Major Depressive Disorder, Recurrent Episode, Moderate _ or 300.02 (F41.1) Generalized Anxiety Disorder  Psychosocial / Contextual Factors: Moderate- Minimal contact with family, Health issues  WHODAS: 30    Referral / Collaboration:  Referral to another professional/service is not indicated at this  time..    Anticipated number of session or this episode of care: 12      MeasurableTreatment Goal(s) related to diagnosis / functional impairment(s)  Goal 1: Client will decrease anxiety symptoms as eveidenced by self-report and TAHIR-7 scores, currently at 14, goal to 7 or below    I will know I've met my goal when I am better able to deal with it.      Objective #A (Client Action)    Client will use relaxation strategies 3x times per day to reduce the physical symptoms of anxiety.  Status: New - Date: 3/14/17     Intervention(s)  Therapist will teach different relaxation strategies and have client complete one between session.    Objective #B  Client will use cognitive strategies identified in therapy to challenge anxious thoughts.  Status: New - Date: 3/14/17     Intervention(s)  Therapist will 1.  introduce cognitive distortions; 2. build awareness for client; 3. leanr an implement diffferent cognitive restructuring techniques.    Objective #C  Client will increase her amount of restful sleep.  Status: New - Date: 3/14/17     Intervention(s)  Therapist will 1. start a sleep journal; 2. Introduce techniques for falling and staying asleep.        Client has reviewed and agreed to the above plan.      Layla Browne, [facility]  March 14, 2017

## 2017-05-09 NOTE — MR AVS SNAPSHOT
MRN:[id number]                      After Visit Summary   5/9/2017    Samara Oropeza    MRN: [id number]           Visit Information        Provider Department      5/9/2017 9:30 AM Layla Browne LICSW [facility] Generic      Your next 10 appointments already scheduled     May 09, 2017  2:40 PM CDT   LUIZ Spine with Cristy Pineda PTA   [facility] ([facility]  )    [address] [place] [id number] [phone]            May 10, 2017 11:00 AM CDT   New Visit with Natalie Cha MD   [facility] ([facility])    [address] [place] [address] 600  [facility] [phone]            May 10, 2017  2:00 PM CDT   Office Visit with EVI Cardona CNP   [facility] ([facility])    [address] [place] [phone]           Bring a current list of meds and any records pertaining to this visit.  For Physicals, please bring immunization records and any forms needing to be filled out.  Please arrive 10 minutes early to complete paperwork.            May 16, 2017  1:30 PM CDT   Return Visit with ALISA Burt   [facility] ([facility])    [address] [phone]            May 17, 2017 10:00 AM CDT   [facility] Visit with Austen [facility]quez MD   [facility]    [address] [phone]            May 19, 2017  2:40 PM CDT   LUIZ Spine with Cristy Pineda PTA   [facility] ([facility]  )    [address] [place] [phone]            May 30, 2017 10:30 AM CDT   Return Visit with Layla  "Joigermaniatracee Browne [facility] ([facility])    [address] [id number] [phone]            2017 10:30 AM CDT   Return Visit with Layla Browne [facility] ([facility])    [address] [address]   [phone]            2017 10:30 AM CDT   Return Visit with Layla Zafar Joeangel [facility] ([facility])    [address] [address]   [phone]            2017  1:50 PM CDT   (Arrive by 1:35 PM)   Return Botox with Frederick Bender MD   [facility] ([facility])    [address] [place] [phone]              MyChart Information     [employer] lets you send messages to your doctor, view your test results, renew your prescriptions, schedule appointments and more. To sign up, go to [email or web address] . Click on \"Log in\" on the left side of the screen, which will take you to the Welcome page. Then click on \"Sign up Now\" on the right side of the page.     You will be asked to enter the access code listed below, as well as some personal information. Please follow the directions to create your username and password.     Your access code is: [email or web address]  Expires: 2017  1:39 PM     Your access code will  in 90 days. If you need help or a new code, please call your [facility] clinic or [phone].        Care EveryWhere ID     This is your Care EveryWhere ID. This could be used by other organizations to access your [facility] medical records  [id number]        "

## 2017-05-09 NOTE — PROGRESS NOTES
"Subjective:    HPI                    Objective:    System    Physical Exam    General     ROS    Assessment/Plan:      SUBJECTIVE  Subjective changes as noted by pt:  Pt reports that neck is most sore along L upper trap close to her neck. Pain lets her get through her ADLs and can do what she needs to through the day although pain is always there. Saw the MD for her ankle and got a cortizone shot 1 week ago which hasn't helped much. She reports that she was told \"tissue is like cotton candy and if the injection doesn't help, he may need to go in and clean it out surgically\" Pt doesn't want surgery in near future since she plans to attend a wedding.        Current pain level: No change     Changes in function:  None     Adverse reaction to treatment or activity:  None    OBJECTIVE  Changes in objective findings:  Yes,   Objective: Cerv ROM mod limited to the L due to pinching, min limted R rot. L shoulder flex and ABD min limited, able to reach well flexing, ABD in doorframe.  Good clemencia to RX today.      ASSESSMENT  Samara continues to require intervention to meet STG and LTG's: PT  Patient is progressing as expected.  Response to therapy has shown an improvement in  ROM  and muscle control  Progress made towards STG/LTG?  None    PLAN  Current treatment program is being advanced to more complex exercises.    PTA/ATC plan:  Will discuss above changes with PT.    Please refer to the daily flowsheet for treatment today, total treatment time and time spent performing 1:1 timed codes.              "

## 2017-05-09 NOTE — PROGRESS NOTES
SUBJECTIVE:                                                    Samara Oropeza is a 21 year old female who presents to clinic today for the following health issues:      Acute Illness   Acute illness concerns: cough, fever.   Onset: 10 days ago started with a cough. Saturday may have had a seizure, usu feverish,     Fever: YES- at home, usually runs a low grade fever    Chills/Sweats: YES    Headache (location?): YES    Sinus Pressure:YES    Conjunctivitis:  no    Ear Pain: YES, both    Rhinorrhea: no     Congestion: YES    Sore Throat: YES- 2 weeks     Cough: YES-productive of brown sputum    Wheeze: YES    Decreased Appetite: YES    Nausea: YES    Vomiting: YES    Diarrhea:  YES    Dysuria/Freq.: no     Fatigue/Achiness: YES    Sick/Strep Exposure: YES- body she takes care of     Therapies Tried and outcome: nothing    Since Saturday--  Weak and body wouldn't stop shaking, very tired and out of it for a while  Mom wants to know if it was a seizure or has something to do with autoimmune things.  Also states blackouts where she will be walking and fall      Problem list and histories reviewed & adjusted, as indicated.  Additional history: as documented    Patient Active Problem List   Diagnosis     Tics - Tourette syndrome     IBS (irritable bowel syndrome)     Seasonal allergic rhinitis     Anxiety     Knee pain     Concussion     Milk protein intolerance     Headaches due to old head injury     Behcet's disease (H)     Migraine     Spell of shaking     On colchicine therapy     Palpitations     Fibromyalgia     Rheumatoid arthritis of multiple sites without rheumatoid factor (H)     Raynaud's disease without gangrene     Chronic abdominal pain     Left knee pain     Patellofemoral instability     PTSD (post-traumatic stress disorder)     Cervical dystonia     Acute left ankle pain     Cervical pain     Past Surgical History:   Procedure Laterality Date     ARTHROSCOPY KNEE WITH PATELLAR REALIGNMENT   7/25/2013    Procedure: ARTHROSCOPY KNEE WITH PATELLAR REALIGNMENT;  Left Knee Arthroscopy, Medial Patellofemoral Ligament Reconstruction with Allograft  ;  Surgeon: Jennifer Acevedo MD;  Location: [facility]     DENTAL SURGERY  1996    Teeth removal     ENDOSCOPY UPPER, COLONOSCOPY, COMBINED  2005     HC ESOPH/GAS REFLUX TEST W NASAL IMPED >1 HR N/A 2/15/2017    Procedure: ESOPHAGEAL IMPEDENCE FUNCTION TEST WITH 24 HOUR PH GREATER THAN 1 HOUR;  Surgeon: Timothy Matta MD;  Location:  GI       Social History   Substance Use Topics     Smoking status: Never Smoker     Smokeless tobacco: Never Used     Alcohol use Yes      Comment: seldom     Family History   Problem Relation Age of Onset     Depression Mother      Neurologic Disorder Mother      Migraines, take imitrex injection.  Also in maternal grandmother.       Alcohol/Drug Father      Hypertension Father      Depression Father      Cardiovascular Maternal Grandmother      Depression Maternal Grandmother      Hypertension Maternal Grandmother      Alzheimer Disease Maternal Grandmother      Cardiovascular Maternal Grandfather      Hypertension Maternal Grandfather      Depression Maternal Grandfather      Alcohol/Drug Maternal Grandfather      Cardiovascular Paternal Grandmother      Hypertension Paternal Grandmother      Cardiovascular Paternal Grandfather      Hypertension Paternal Grandfather      Glaucoma No family hx of      Macular Degeneration No family hx of          Current Outpatient Prescriptions   Medication Sig Dispense Refill     diclofenac (VOLTAREN) 1 % GEL topical gel Apply 2-4 grams to affected area(s) up to 4 times per day as needed. This is an anti-inflammatory medication. 100 g 4     metaxalone (SKELAXIN) 800 MG tablet Take 0.5 tablets (400 mg) by mouth 3 times daily as needed for moderate pain 30 tablet 1     LORazepam (ATIVAN) 1 MG tablet Take 0.5 tablets (0.5 mg) by mouth every 6 hours as needed for other (abdominal pain) Do not  operate a vehicle after taking this medication 90 tablet 1     ondansetron (ZOFRAN-ODT) 8 MG ODT tab Take 1 tablet (8 mg) by mouth every 8 hours as needed for nausea 60 tablet 11     lactobacillus rhamnosus, GG, (CULTURELL) capsule        aspirin (ASPIRIN CHILDRENS) 81 MG chewable tablet Take 81 mg by mouth daily       dicyclomine (BENTYL) 20 MG tablet Take 1 tablet (20 mg) by mouth 4 times daily as needed 60 tablet 1     amitriptyline (ELAVIL) 25 MG tablet Take 1 tablet (25 mg) by mouth At Bedtime 45 tablet 5     omeprazole (PRILOSEC) 40 MG capsule Take 1 capsule (40 mg) by mouth daily 90 capsule 3     Cholecalciferol (VITAMIN D) 2000 UNITS tablet Take 2,000 Units by mouth daily 100 tablet 3     EPINEPHrine (EPIPEN 2-EAMON) 0.3 MG/0.3ML injection Inject 0.3 mLs (0.3 mg) into the muscle as needed for anaphylaxis 0.6 mL 3     Apremilast (OTEZLA) 10 & 20 & 30 MG TBPK Take one tablet in the morning on day 1, then take one tablet every 12 hours on days 2 thru 14, according to the instructions on the packet. 27 tablet 0     apremilast (OTEZLA) 30 MG tablet 20 mg in AM and 30mg in PM daily X 2 weeks and then 30mg twice daily. 60 tablet 3     ondansetron (ZOFRAN-ODT) 4 MG ODT tab Take 1 tablet (4 mg) by mouth every 6 hours as needed for nausea 20 tablet 0     Colchicine 0.6 MG CAPS Take 0.6 mg by mouth See Admin Instructions 2 capsules in AM and 1 capsule in PM 90 capsule 3     dexamethasone (DECADRON) 4 MG/ML injection Apply 1 mL (4 mg) topically as needed 30 mL 0     OnabotulinumtoxinA (BOTOX IJ) Inject 162.5 Units as directed once Lot #: /C3  Exp: 10/2019       guanFACINE (TENEX) 1 MG tablet 3 tablets in the morning and 3 tablets in the evening 180 tablet 3     norgestimate-ethinyl estradiol (ORTHO-CYCLEN, SPRINTEC) 0.25-35 MG-MCG per tablet Take 1 tablet by mouth daily 84 tablet 3     albuterol (PROAIR HFA/PROVENTIL HFA/VENTOLIN HFA) 108 (90 BASE) MCG/ACT Inhaler Inhale 2 puffs into the lungs every 6 hours as needed  for shortness of breath / dyspnea or wheezing 1 Inhaler 1     OnabotulinumtoxinA (BOTOX IJ) Inject 150 Units into the muscle Lot # /C3  Exp: 8/2019       SUMAtriptan (IMITREX) 100 MG tablet Take 1 tablet (100 mg) by mouth at onset of headache for migraine May repeat in 2 hours. Max 2 tablets/24 hours. 18 tablet 3     promethazine (PHENERGAN) 25 MG tablet Take 0.5-1 tablets (12.5-25 mg) by mouth every 6 hours as needed for nausea 20 tablet 1     sucralfate (CARAFATE) 1 GM/10ML suspension Take 10 mLs (1 g) by mouth 4 times daily 1200 mL 2     meclizine (ANTIVERT) 25 MG tablet Take 1 tablet (25 mg) by mouth every 6 hours as needed for dizziness 30 tablet 1     Magic Mouthwash (FV std formula) lidocaine visc 2% 2.5mL/5mL & maalox/mylanta w/ simeth 2.5mL/5mL & diphenhydrAMINE 5mg/5mL Swish and swallow 10 mLs in mouth every 6 hours as needed for mouth sores 1 Bottle 1     clobetasol (TEMOVATE) 0.05 % cream Apply topically 2 times daily 60 g 0     OnabotulinumtoxinA (BOTOX IJ) Inject 150 Units into the muscle once Lot: /C3 Exp: 05/2019       botulinum toxin type A (BOTOX) 100 UNITS injection Inject 200 Units into the muscle every 3 months 200 Units 3     linaclotide (LINZESS) 290 MCG capsule Take 1 capsule (290 mcg) by mouth every morning (before breakfast) 30 capsule 1     UNABLE TO FIND daily Reported on 4/21/2017       hyoscyamine (ANASPAZ,LEVSIN) 0.125 MG tablet Take 1-2 tablets (125-250 mcg) by mouth every 4 hours as needed for cramping 40 tablet 1     Aspirin-Acetaminophen-Caffeine (EXCEDRIN MIGRAINE PO) Take by mouth as needed        hypromellose (GENTEAL) 0.3 % SOLN 1 drop every hour as needed       betamethasone valerate (VALISONE) 0.1 % cream Apply topically 2 times daily       carbamide peroxide (DEBROX) 6.5 % otic solution 5 drops 2 times daily       Lactase (LACTAID PO) Take by mouth daily       Pyridoxine HCl (VITAMIN B6 PO) Reported on 4/21/2017       lidocaine (XYLOCAINE) 2 % jelly Apply 1 Tube  topically daily Reported on 4/21/2017       triamcinolone (KENALOG) 0.1 % cream Apply sparingly to oral ulcers three times daily for 14 days as needed. 15 g 1     Ranitidine HCl (RANITIDINE 75 PO) Take  by mouth. As needed for GI upset       Allergies   Allergen Reactions     Amoxil [Penicillins] Rash     Dad unsure of reaction.     Contrast Dye Rash     Contrast Media Ready-Box [facility].; Contrast Media Ready-Box [facility].  NOTE: this is a contrast media oral with iodine. Premedicate with methylpred standard for IV contrast, request barium contrast for oral contrast.     Kiwi Swelling     Orange Fruit [Citrus] Anaphylaxis     Pineapple Anaphylaxis, Difficulty breathing and Rash     Milk Protein Extract Hives     Reglan [Metoclopramide] Other (See Comments)     IV dose only, in ER, rapid heart rate.     Ace Inhibitors      Difficulty in breathing and GI upset     Amitiza [Lubiprostone] Nausea and Vomiting     Amoxicillin-Pot Clavulanate      Latex      Midazolam Unknown     parent states that when pt takes this medication, she wakes up being very violent .     Versed      Coming out of pelvic exam at age of 5, was kicking and screaming when coming out of the versed.     Adhesive Tape Rash     Azithromycin Hives and Rash     Cephalexin Itching and Rash     Itchy mouth     Keflex [Cephalexin-Fd&C Yellow #6] Hives       Reviewed and updated as needed this visit by clinical staff       Reviewed and updated as needed this visit by Provider       ROS:  C: POSITIVE for fever  INTEGUMENTARY/SKIN: NEGATIVE for worrisome rashes, moles or lesions  E/M: POSITIVE for runny nose  R: POSITIVE for cough  CV: POSITIVE for chest pain, POSITIVE for Bechets   GI: POSITIVE for chronic constipation  : NEGATIVE for dysuria, hematuria, decreased urinary stream or discharge  MUSCULOSKELETAL: NEGATIVE for significant arthralgias or myalgia  NEURO: POSITIVE for seizure like episode  ENDOCRINE: NEGATIVE for cold intolerance,  HX diabetes and HX thyroid disease  HEME/ALLERGY/IMMUNE: NEGATIVE for swollen nodes  PSYCH: POSITIVE for PTSD      OBJECTIVE:                                                    /76  Pulse 89  Temp 97.4  F (36.3  C) (Oral)  Wt 150 lb (68 kg)  LMP 04/07/2017  SpO2 100%  BMI 26.57 kg/m2  Body mass index is 26.57 kg/(m^2).  GENERAL: healthy, alert and no distress  EYES: Eyes grossly normal to inspection, PERRL and conjunctivae and sclerae normal  HENT: ear canals and TM's normal, nose and mouth without ulcers or lesions  NECK: no adenopathy, no asymmetry, masses, or scars and thyroid normal to palpation  RESP: lungs clear to auscultation - no rales, rhonchi or wheezes  CV: regular rate and rhythm, normal S1 S2, no S3 or S4, no murmur, click or rub, no peripheral edema and peripheral pulses strong.   ABDOMEN: soft, nontender, no hepatosplenomegaly, no masses and bowel sounds normal  MS: no gross musculoskeletal defects noted, no edema. TTP to left side of chest.     Diagnostic Test Results:  none      ASSESSMENT/PLAN:                                                    1. Acute bronchitis with symptoms > 10 days  Will treat for acute bronchitis based on symptoms and description of symptoms. Mother suggests that a CT is the only way to rule in / rule out problems with Bechets syndrome. Vitals are stable and exam is essentially benign, NO S&S of pneumonia or PE. Went over risks of CT, including exposure to rads, and given her vitals I think it would be best to forego CT and treat clinically. Went over RED FLAG symptoms of bronchitis. Patient expressed multiple complaints, concerned that some may be psychogenic in nature.    - benzonatate (TESSALON) 100 MG capsule; Take 1-2 pills THREE TIMES PER DAY as needed for cough  Dispense: 42 capsule; Refill: 0  - doxycycline (VIBRAMYCIN) 100 MG capsule; Take 1 capsule (100 mg) by mouth 2 times daily  Dispense: 20 capsule; Refill: 0    2. Convulsions, unspecified  convulsion type (H)    - NEUROLOGY ADULT REFERRAL    Mikaela Reyez PA-C  [facility]

## 2017-05-09 NOTE — MR AVS SNAPSHOT
After Visit Summary   5/9/2017    Samara Oropeza    MRN: [id number]           Patient Information     Date Of Birth          1995        Visit Information        Provider Department      5/9/2017 2:40 PM Cristy Pineda PTA [facility]        Today's Diagnoses     Cervical pain           Follow-ups after your visit        Your next 10 appointments already scheduled     May 10, 2017 11:00 AM CDT   New Visit with Natalie Cha MD   [facility] ([facility])    [address] [place] [address]  [facility] [id number] [phone]            May 10, 2017  2:00 PM CDT   Office Visit with EVI Cardona [facility] ([facility])    [address] [place] [address]  [facility] [id number] [phone]           Bring a current list of meds and any records pertaining to this visit.  For Physicals, please bring immunization records and any forms needing to be filled out.  Please arrive 10 minutes early to complete paperwork.            May 16, 2017  1:30 PM CDT   Return Visit with ALISA Burt   [facility] ([facility])    [address] [id number] [phone]            May 17, 2017 10:00 AM CDT   Return Visit with Austen Marquez MD   [facility] ([facility])    [address] [id number] [phone]            May 19, 2017  2:40 PM CDT   LUI[facility] Spine with Cristy Pineda PTA   [facility]  )    [address] [place] [id number] [phone]            May 30, 2017 10:30 AM CDT   Return Visit with ALISA Burt   [facility] ([facility])    [address] [place] [address]  "[facility] [address]   [phone]            Jun 06, 2017 10:30 AM CDT   Return Visit with Layla ALISA Avilez   [facility] ([facility])    [address] [address]   [phone]            Jun 13, 2017 10:30 AM CDT   Return Visit with Layla ALISA Avilez   [facility] ([facility])    [address] [address]   [phone]            Jun 22, 2017  1:50 PM CDT   (Arrive by 1:35 PM)   Return Botox with Frederick Bender MD   [facility] ([facility])    [address] [place] [id number] [phone]              Who to contact     If you have questions or need follow up information about today's clinic visit or your schedule please contact [place] [facility] directly at [phone].  Normal or non-critical lab and imaging results will be communicated to you by MyChart, letter or phone within 4 business days after the clinic has received the results. If you do not hear from us within 7 days, please contact the clinic through MyChart or phone. If you have a critical or abnormal lab result, we will notify you by phone as soon as possible.  Submit refill requests through [email or web address] or call your pharmacy and they will forward the refill request to us. Please allow 3 business days for your refill to be completed.          Additional Information About Your Visit        [employer] Information     [email or web address] lets you send messages to your doctor, view your test results, renew your prescriptions, schedule appointments and more. To sign up, go to [email or web address] . Click on \"Log in\" on the left side of the screen, which will take you to the Welcome page. Then click on \"Sign up Now\" on the right side of the page. "     You will be asked to enter the access code listed below, as well as some personal information. Please follow the directions to create your username and password.     Your access code is: [email or web address]  Expires: 2017  1:39 PM     Your access code will  in 90 days. If you need help or a new code, please call your [place] clinic or [phone].        Care EveryWhere ID     This is your Care EveryWhere ID. This could be used by other organizations to access your [facility] medical records  [id number]        Your Vitals Were     Last Period                   2017            Blood Pressure from Last 3 Encounters:   17 110/70   17 102/70   17 104/71    Weight from Last 3 Encounters:   17 68 kg (150 lb)   17 68 kg (150 lb)   17 68 kg (150 lb)              We Performed the Following     Manual Ther Tech, 1+Regions, EA 15 min     Neuromuscular Re-Education     Therapeutic Exercises        Primary Care Provider Office Phone # Fax #    EVI Cardona [facility] [phone]       [facility] [address] [address]        Thank you!     Thank you for choosing [place] [facility]ERAP[facility]  for your care. Our goal is always to provide you with excellent care. Hearing back from our patients is one way we can continue to improve our services. Please take a few minutes to complete the written survey that you may receive in the mail after your visit with us. Thank you!             Your Updated Medication List - Protect others around you: Learn how to safely use, store and throw away your medicines at www.disposemymeds.org.          This list is accurate as of: 17  3:45 PM.  Always use your most recent med list.                   Brand Name Dispense Instructions for use    albuterol 108 (90 BASE) MCG/ACT Inhaler    PROAIR HFA/PROVENTIL HFA/VENTOLIN HFA    1 Inhaler    Inhale 2 puffs into the lungs  every 6 hours as needed for shortness of breath / dyspnea or wheezing       amitriptyline 25 MG tablet    ELAVIL    45 tablet    Take 1 tablet (25 mg) by mouth At Bedtime       * Apremilast 10 & 20 & 30 MG Tbpk    OTEZLA    27 tablet    Take one tablet in the morning on day 1, then take one tablet every 12 hours on days 2 thru 14, according to the instructions on the packet.       * apremilast 30 MG tablet    OTEZLA    60 tablet    20 mg in AM and 30mg in PM daily X 2 weeks and then 30mg twice daily.       ASPIRIN CHILDRENS 81 MG chewable tablet   Generic drug:  aspirin      Take 81 mg by mouth daily       betamethasone valerate 0.1 % cream    VALISONE     Apply topically 2 times daily       * botulinum toxin type A 100 UNITS injection    BOTOX    200 Units    Inject 200 Units into the muscle every 3 months       * BOTOX IJ      Inject 150 Units into the muscle once Lot: /C3 Exp: 05/2019       * BOTOX IJ      Inject 150 Units into the muscle Lot # /C3  Exp: 8/2019       * BOTOX IJ      Inject 162.5 Units as directed once Lot #: /C3 Exp: 10/2019       carbamide peroxide 6.5 % otic solution    DEBROX     5 drops 2 times daily       clobetasol 0.05 % cream    TEMOVATE    60 g    Apply topically 2 times daily       Colchicine 0.6 MG Caps     90 capsule    Take 0.6 mg by mouth See Admin Instructions 2 capsules in AM and 1 capsule in PM       dexamethasone 4 MG/ML injection    DECADRON    30 mL    Apply 1 mL (4 mg) topically as needed       dicyclomine 20 MG tablet    BENTYL    60 tablet    Take 1 tablet (20 mg) by mouth 4 times daily as needed       EPINEPHrine 0.3 MG/0.3ML injection    EPIPEN 2-EAMON    0.6 mL    Inject 0.3 mLs (0.3 mg) into the muscle as needed for anaphylaxis       EXCEDRIN MIGRAINE PO      Take by mouth as needed       guanFACINE 1 MG tablet    TENEX    180 tablet    3 tablets in the morning and 3 tablets in the evening       hyoscyamine 0.125 MG tablet    ANASPAZ/LEVSIN    40 tablet     Take 1-2 tablets (125-250 mcg) by mouth every 4 hours as needed for cramping       hypromellose 0.3 % Soln ophthalmic solution    GENTEAL     1 drop every hour as needed       LACTAID PO      Take by mouth daily       lactobacillus rhamnosus (GG) capsule          lidocaine 2 % topical gel    XYLOCAINE     Apply 1 Tube topically daily Reported on 4/21/2017       lidocaine visc 2% 2.5mL/5mL & maalox/mylanta w/ simeth 2.5mL/5mL & diphenhydrAMINE 5mg/5mL Susp suspension    [facility]    1 Bottle    Swish and swallow 10 mLs in mouth every 6 hours as needed for mouth sores       linaclotide 290 MCG capsule    LINZESS    30 capsule    Take 1 capsule (290 mcg) by mouth every morning (before breakfast)       LORazepam 1 MG tablet    ATIVAN    90 tablet    Take 0.5 tablets (0.5 mg) by mouth every 6 hours as needed for other (abdominal pain) Do not operate a vehicle after taking this medication       meclizine 25 MG tablet    ANTIVERT    30 tablet    Take 1 tablet (25 mg) by mouth every 6 hours as needed for dizziness       metaxalone 800 MG tablet    SKELAXIN    30 tablet    Take 0.5 tablets (400 mg) by mouth 3 times daily as needed for moderate pain       norgestimate-ethinyl estradiol 0.25-35 MG-MCG per tablet    ORTHO-CYCLEN, SPRINTEC    84 tablet    Take 1 tablet by mouth daily       omeprazole 40 MG capsule    priLOSEC    90 capsule    Take 1 capsule (40 mg) by mouth daily       * ondansetron 4 MG ODT tab    ZOFRAN-ODT    20 tablet    Take 1 tablet (4 mg) by mouth every 6 hours as needed for nausea       * ondansetron 8 MG ODT tab    ZOFRAN-ODT    60 tablet    Take 1 tablet (8 mg) by mouth every 8 hours as needed for nausea       promethazine 25 MG tablet    PHENERGAN    20 tablet    Take 0.5-1 tablets (12.5-25 mg) by mouth every 6 hours as needed for nausea       RANITIDINE 75 PO      Take  by mouth. As needed for GI upset       sucralfate 1 GM/10ML suspension    CARAFATE    1200 mL    Take 10 mLs (1 g) by  mouth 4 times daily       SUMAtriptan 100 MG tablet    IMITREX    18 tablet    Take 1 tablet (100 mg) by mouth at onset of headache for migraine May repeat in 2 hours. Max 2 tablets/24 hours.       triamcinolone 0.1 % cream    KENALOG    15 g    Apply sparingly to oral ulcers three times daily for 14 days as needed.       UNABLE TO FIND      daily Reported on 4/21/2017       VITAMIN B6 PO      Reported on 4/21/2017       vitamin D 2000 UNITS tablet     100 tablet    Take 2,000 Units by mouth daily       * Notice:  This list has 8 medication(s) that are the same as other medications prescribed for you. Read the directions carefully, and ask your doctor or other care provider to review them with you.

## 2017-05-10 ENCOUNTER — OFFICE VISIT (OUTPATIENT)
Dept: PALLIATIVE MEDICINE | Facility: CLINIC | Age: 22
End: 2017-05-10
Payer: COMMERCIAL

## 2017-05-10 ENCOUNTER — HOSPITAL ENCOUNTER (OUTPATIENT)
Dept: GENERAL RADIOLOGY | Facility: CLINIC | Age: 22
Discharge: HOME | End: 2017-05-10
Attending: PAIN MEDICINE | Admitting: PAIN MEDICINE
Payer: COMMERCIAL

## 2017-05-10 ENCOUNTER — OFFICE VISIT (OUTPATIENT)
Dept: FAMILY MEDICINE | Facility: CLINIC | Age: 22
End: 2017-05-10
Payer: COMMERCIAL

## 2017-05-10 ENCOUNTER — TELEPHONE (OUTPATIENT)
Dept: PALLIATIVE MEDICINE | Facility: CLINIC | Age: 22
End: 2017-05-10

## 2017-05-10 VITALS
HEART RATE: 83 BPM | OXYGEN SATURATION: 98 % | BODY MASS INDEX: 26.57 KG/M2 | SYSTOLIC BLOOD PRESSURE: 105 MMHG | DIASTOLIC BLOOD PRESSURE: 67 MMHG | WEIGHT: 150 LBS

## 2017-05-10 VITALS
OXYGEN SATURATION: 100 % | TEMPERATURE: 97.4 F | HEART RATE: 89 BPM | BODY MASS INDEX: 26.57 KG/M2 | SYSTOLIC BLOOD PRESSURE: 105 MMHG | DIASTOLIC BLOOD PRESSURE: 76 MMHG | WEIGHT: 150 LBS

## 2017-05-10 DIAGNOSIS — R10.9 CHRONIC ABDOMINAL PAIN: ICD-10-CM

## 2017-05-10 DIAGNOSIS — G89.4 CHRONIC PAIN SYNDROME: Primary | ICD-10-CM

## 2017-05-10 DIAGNOSIS — F41.9 ANXIETY: ICD-10-CM

## 2017-05-10 DIAGNOSIS — G89.29 CHRONIC ABDOMINAL PAIN: ICD-10-CM

## 2017-05-10 DIAGNOSIS — Z98.890 HISTORY OF LEFT KNEE SURGERY: ICD-10-CM

## 2017-05-10 DIAGNOSIS — M54.2 CHRONIC NECK AND BACK PAIN: ICD-10-CM

## 2017-05-10 DIAGNOSIS — J20.9 ACUTE BRONCHITIS WITH SYMPTOMS > 10 DAYS: Primary | ICD-10-CM

## 2017-05-10 DIAGNOSIS — M25.50 POLYARTHRALGIA: ICD-10-CM

## 2017-05-10 DIAGNOSIS — G89.29 CHRONIC NECK AND BACK PAIN: ICD-10-CM

## 2017-05-10 DIAGNOSIS — Z87.39 HISTORY OF FIBROMYALGIA: ICD-10-CM

## 2017-05-10 DIAGNOSIS — S13.9XXS: ICD-10-CM

## 2017-05-10 DIAGNOSIS — M54.9 CHRONIC NECK AND BACK PAIN: ICD-10-CM

## 2017-05-10 DIAGNOSIS — M54.2 CERVICALGIA: ICD-10-CM

## 2017-05-10 DIAGNOSIS — R56.9 CONVULSIONS, UNSPECIFIED CONVULSION TYPE (H): ICD-10-CM

## 2017-05-10 PROCEDURE — 72040 X-RAY EXAM NECK SPINE 2-3 VW: CPT

## 2017-05-10 PROCEDURE — 99205 OFFICE O/P NEW HI 60 MIN: CPT | Performed by: PAIN MEDICINE

## 2017-05-10 PROCEDURE — 99213 OFFICE O/P EST LOW 20 MIN: CPT | Performed by: PHYSICIAN ASSISTANT

## 2017-05-10 RX ORDER — BENZONATATE 100 MG/1
CAPSULE ORAL
Qty: 42 CAPSULE | Refills: 0 | Status: SHIPPED | OUTPATIENT
Start: 2017-05-10 | End: 2017-07-31

## 2017-05-10 RX ORDER — DOXYCYCLINE 100 MG/1
100 CAPSULE ORAL 2 TIMES DAILY
Qty: 20 CAPSULE | Refills: 0 | Status: SHIPPED | OUTPATIENT
Start: 2017-05-10 | End: 2017-07-31

## 2017-05-10 ASSESSMENT — PAIN SCALES - GENERAL: PAINLEVEL: SEVERE PAIN (6)

## 2017-05-10 ASSESSMENT — ANXIETY QUESTIONNAIRES: GAD7 TOTAL SCORE: 17

## 2017-05-10 ASSESSMENT — PATIENT HEALTH QUESTIONNAIRE - PHQ9: SUM OF ALL RESPONSES TO PHQ QUESTIONS 1-9: 19
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
## 2017-05-10 NOTE — NURSING NOTE
"Chief Complaint   Patient presents with     Pain       Initial /67 (BP Location: Right arm, Patient Position: Chair, Cuff Size: Adult Small)  Pulse 83  Wt 68 kg (150 lb)  LMP 04/07/2017  SpO2 98%  BMI 26.57 kg/m2 Estimated body mass index is 26.57 kg/(m^2) as calculated from the following:    Height as of 5/2/17: 1.6 m (5' 3\").    Weight as of this encounter: 68 kg (150 lb).  Medication Reconciliation: complete       Eloise Amato MA  [facility]      "

## 2017-05-10 NOTE — TELEPHONE ENCOUNTER
Prior Authorization Approval    Authorization Effective Date: 5/8/2017  Authorization Expiration Date: 5/8/2020  Medication: Omeprazole 40mg- Approved  Approved Dose/Quantity:   Reference #: [id number]   Insurance Company: [employer] - Phone [phone] Fax [phone]  Expected CoPay: $0.00     CoPay Card Available:      Foundation Assistance Needed:    Which Pharmacy is filling the prescription (Not needed for infusion/clinic administered): [facility] - [address]  Pharmacy Notified: Yes  Patient Notified: Yes
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
## 2017-05-11 NOTE — TELEPHONE ENCOUNTER
Generic message left that all results normal from the x-ray you had done - we are mailing you a copy of the report. Put in outgoing mail.     Kat Sneed, MSN, RN-BC  Care Coordinator  [facility]

## 2017-05-16 ENCOUNTER — OFFICE VISIT (OUTPATIENT)
Dept: BEHAVIORAL HEALTH | Facility: CLINIC | Age: 22
End: 2017-05-16
Payer: COMMERCIAL

## 2017-05-16 DIAGNOSIS — F41.1 GAD (GENERALIZED ANXIETY DISORDER): ICD-10-CM

## 2017-05-16 DIAGNOSIS — F43.10 PTSD (POST-TRAUMATIC STRESS DISORDER): ICD-10-CM

## 2017-05-16 DIAGNOSIS — F33.1 MODERATE EPISODE OF RECURRENT MAJOR DEPRESSIVE DISORDER (H): Primary | ICD-10-CM

## 2017-05-16 DIAGNOSIS — M35.2 BEHCET'S DISEASE (H): ICD-10-CM

## 2017-05-16 PROCEDURE — 90834 PSYTX W PT 45 MINUTES: CPT | Performed by: SOCIAL WORKER

## 2017-05-16 NOTE — PROGRESS NOTES
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 Lilliana Miramontes- complicated migraine  # On Sprintec for birth control   # Borderline transaminase elevation    4/17 Basic blood cell counts, liver and kidney function labs within normal limits except borderline ALT elevation. CRP within normal limits    Meets ISG 1990 criteria for Behcets. Initially, very good response to colchicine which has reduced the intensity and frequency of the oral ulcers in the past. Patient relapsed with genital ulcers and few oral ulcers in the end of 2016 and also with folliculitis in skin. Seen Derm Dr. Elliott. He recommended otezla. Otezla is approved for her now. Slow tapering up because of GI intolerance. Currently on 20 mg in AM and 30mg in PM. The severity and frequency of oral and genital ulcers have improved on otezla already. When she skipped couple of days of otezla, her symptoms were worse. Increase otezla to 30mg PO BID as tolerated. Continue colchicine 1.2mg in AM and 0.6mg in PM daily    Has tried prednisone in the past, but does not tolerate well due to mood issues, and has been off prednisone for the past 6+ months. Has H/o Tourettes. Followed by Dr. Miramontes.     She continues to have GI symptoms  Colonoscopy was negative in the past.  Has gastroparesis.  Behcets may have GI involvement but  no evidence of GI inflammation at this time. Dr. Baker has evaluated her.   She gets visual symptoms  But there is no evidence of uveitis including posterior uveitis or retinal vasculitis, denies symptoms at today's visit. She has seen Dr. Garcia in the past and also seen Dr. Heaton around 2/16. Patient still getting double vision. Floaters present. I would refer to opthalmology again for reassessment.      Left ankle sprain followed by podiatry     She also likely has fibromyalgia which is uncontrolled. Since patient has medication intolerance, we will address fibromyalgia after optimizing her behcets treatment.     For chest symptoms, she has been referred to pulmonology by GI.     - Continue Triamcinolone acetonide cream (0.1 percent in Or[employer]) three to four times daily during attacks of oral ulcers and be used until pain from the ulcer ceases. Potent topical corticosteroids may be used for genital ulcers. Also use magic mouthwash as needed.  - Other comorbidities to watch for in Behcets: Vascular disease in Behçet s is more common in men. Large vessel vascular involvement occurs in approximately one-third of patients with Behcet s disease. Pulmonary artery vasculitis can present with hemoptysis (misdiagnosis can lead to poor prognosis). Secondary fibromyalgia, CNS disease, amyloidosis, sacroiliitis.     Return in about 3 months (around 8/17/2017).    Orders Placed This Encounter   Procedures     OPHTHALMOLOGY ADULT REFERRAL       Medications Discontinued During This Encounter   Medication Reason     lactobacillus rhamnosus, GG, (CULTURELL) capsule      Cholecalciferol (VITAMIN D) 2000 UNITS tablet      UNABLE TO FIND      Pyridoxine HCl (VITAMIN B6 PO)      Apremilast (OTEZLA) 10 & 20 & 30 MG TBPK      Current Outpatient Prescriptions   Medication Sig Dispense Refill     diclofenac (VOLTAREN) 1 % GEL topical gel Apply 2-4 grams to affected area(s) up to 4 times per day as needed. This is an  anti-inflammatory medication. 100 g 4     benzonatate (TESSALON) 100 MG capsule Take 1-2 pills THREE TIMES PER DAY as needed for cough 42 capsule 0     doxycycline (VIBRAMYCIN) 100 MG capsule Take 1 capsule (100 mg) by mouth 2 times daily 20 capsule 0     metaxalone (SKELAXIN) 800 MG tablet Take 0.5 tablets (400 mg) by mouth 3 times daily as needed for moderate pain 30 tablet 1     LORazepam (ATIVAN) 1 MG tablet Take 0.5 tablets (0.5 mg) by mouth every 6 hours as needed for other (abdominal pain) Do not operate a vehicle after taking this medication 90 tablet 1     ondansetron (ZOFRAN-ODT) 8 MG ODT tab Take 1 tablet (8 mg) by mouth every 8 hours as needed for nausea 60 tablet 11     aspirin (ASPIRIN CHILDRENS) 81 MG chewable tablet Take 81 mg by mouth daily       dicyclomine (BENTYL) 20 MG tablet Take 1 tablet (20 mg) by mouth 4 times daily as needed 60 tablet 1     amitriptyline (ELAVIL) 25 MG tablet Take 1 tablet (25 mg) by mouth At Bedtime 45 tablet 5     omeprazole (PRILOSEC) 40 MG capsule Take 1 capsule (40 mg) by mouth daily 90 capsule 3     EPINEPHrine (EPIPEN 2-EAMON) 0.3 MG/0.3ML injection Inject 0.3 mLs (0.3 mg) into the muscle as needed for anaphylaxis 0.6 mL 3     apremilast (OTEZLA) 30 MG tablet 20 mg in AM and 30mg in PM daily X 2 weeks and then 30mg twice daily. 60 tablet 3     ondansetron (ZOFRAN-ODT) 4 MG ODT tab Take 1 tablet (4 mg) by mouth every 6 hours as needed for nausea 20 tablet 0     Colchicine 0.6 MG CAPS Take 0.6 mg by mouth See Admin Instructions 2 capsules in AM and 1 capsule in PM 90 capsule 3     dexamethasone (DECADRON) 4 MG/ML injection Apply 1 mL (4 mg) topically as needed 30 mL 0     OnabotulinumtoxinA (BOTOX IJ) Inject 162.5 Units as directed once Lot #: /C3  Exp: 10/2019       guanFACINE (TENEX) 1 MG tablet 3 tablets in the morning and 3 tablets in the evening 180 tablet 3     norgestimate-ethinyl estradiol (ORTHO-CYCLEN, SPRINTEC) 0.25-35 MG-MCG per tablet Take 1 tablet by  mouth daily 84 tablet 3     albuterol (PROAIR HFA/PROVENTIL HFA/VENTOLIN HFA) 108 (90 BASE) MCG/ACT Inhaler Inhale 2 puffs into the lungs every 6 hours as needed for shortness of breath / dyspnea or wheezing 1 Inhaler 1     OnabotulinumtoxinA (BOTOX IJ) Inject 150 Units into the muscle Lot # /C3  Exp: 8/2019       SUMAtriptan (IMITREX) 100 MG tablet Take 1 tablet (100 mg) by mouth at onset of headache for migraine May repeat in 2 hours. Max 2 tablets/24 hours. 18 tablet 3     promethazine (PHENERGAN) 25 MG tablet Take 0.5-1 tablets (12.5-25 mg) by mouth every 6 hours as needed for nausea 20 tablet 1     sucralfate (CARAFATE) 1 GM/10ML suspension Take 10 mLs (1 g) by mouth 4 times daily 1200 mL 2     meclizine (ANTIVERT) 25 MG tablet Take 1 tablet (25 mg) by mouth every 6 hours as needed for dizziness 30 tablet 1     Magic Mouthwash (FV std formula) lidocaine visc 2% 2.5mL/5mL & maalox/mylanta w/ simeth 2.5mL/5mL & diphenhydrAMINE 5mg/5mL Swish and swallow 10 mLs in mouth every 6 hours as needed for mouth sores 1 Bottle 1     clobetasol (TEMOVATE) 0.05 % cream Apply topically 2 times daily 60 g 0     OnabotulinumtoxinA (BOTOX IJ) Inject 150 Units into the muscle once Lot: /C3 Exp: 05/2019       botulinum toxin type A (BOTOX) 100 UNITS injection Inject 200 Units into the muscle every 3 months 200 Units 3     linaclotide (LINZESS) 290 MCG capsule Take 1 capsule (290 mcg) by mouth every morning (before breakfast) 30 capsule 1     hyoscyamine (ANASPAZ,LEVSIN) 0.125 MG tablet Take 1-2 tablets (125-250 mcg) by mouth every 4 hours as needed for cramping 40 tablet 1     Aspirin-Acetaminophen-Caffeine (EXCEDRIN MIGRAINE PO) Take by mouth as needed        hypromellose (GENTEAL) 0.3 % SOLN 1 drop every hour as needed       betamethasone valerate (VALISONE) 0.1 % cream Apply topically 2 times daily       carbamide peroxide (DEBROX) 6.5 % otic solution 5 drops 2 times daily       Lactase (LACTAID PO) Take by mouth daily        lidocaine (XYLOCAINE) 2 % jelly Apply 1 Tube topically daily Reported on 4/21/2017       triamcinolone (KENALOG) 0.1 % cream Apply sparingly to oral ulcers three times daily for 14 days as needed. 15 g 1     Ranitidine HCl (RANITIDINE 75 PO) Take  by mouth. As needed for GI upset       Austen Marquez MD.           Active Problem List:     Patient Active Problem List    Diagnosis Date Noted     Cervical pain 05/02/2017     Priority: Medium     Acute left ankle pain 03/31/2017     Priority: Medium     Cervical dystonia 03/28/2017     Priority: Medium     PTSD (post-traumatic stress disorder) 01/17/2017     Priority: Medium     Left knee pain 10/20/2016     Priority: Medium     Patellofemoral instability 10/20/2016     Priority: Medium     Fibromyalgia 08/04/2016     Priority: Medium     Rheumatoid arthritis of multiple sites without rheumatoid factor (H) 08/04/2016     Priority: Medium     Raynaud's disease without gangrene 08/04/2016     Priority: Medium     Chronic abdominal pain 08/04/2016     Priority: Medium     Palpitations 01/12/2016     Priority: Medium     On colchicine therapy 10/30/2015     Priority: Medium     Spell of shaking 05/06/2015     Priority: Medium     Migraine 02/04/2015     Priority: Medium     Problem list name updated by automated process. 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 Symptoms well managed. Originally diagnosed at [facility] neurolog[facility]. (Dr. Simpson)           IBS (irritable bowel syndrome) 05/18/2009     Priority:  Medium     Seasonal allergic rhinitis 05/18/2009     Priority: Medium          History of Present Illness:     Chief Complaint   Patient presents with     RECHECK     Seen Dr. Marilyn Moran Rheumatology in [facility] [place] 10/14:    Original presentation 2014:    Ms Oropeza is a 19 y.o. female who presents with one year of presentation of painful oral ulcers (monthly) once that have worsened with two episodes in the last two months that presented with painful vulvar or vaginal ulcers (2 episodes so far every month) [not sure if they leave scar] as well that timing coincides with menses ([facility] [place]: 8/25/14).   ORAL ULCERS: last two episodes were severe, painful, white base with erythematous halo, that appear and disappear in the matter of 1-2 weeks without, does not bleed and doesn't respond to any treatment used (magic mouthwash), 10-15 in number with the biggest one being dime size.     VAGINAL ULCERS: 2-3 in number between labia majora and minora that occur simultaneously with oral ulcers, painful, non bleeding ulcers that are erythematous, no pus.     FOLLICULITIS: patient reports having spots in legs specially around ankle and knee, but arms, and neck are affected as well. Small lesions that resemble ingrown hair, most are red erythematous elevated lesions, well demarcated, some with liquid that patient refers as pimple like.     SKIN RASHES: welts and red macules with well defined borders that are pruritic and non-ulcerative on chest, arms and back. Benadryl relieves.     ARTHRALGIAS: In descending order of severity: hips, knees, wrists, shoulders, neck, lumbar, fingers.   C/o morning stiffness in hips, back and neck lasting for about until noon.     Ms. Oropeza reports they present in severe flares and never fully resolve, but do get better. She has seen some swelling and warmth on the affected joints but has not noticed and redness. Has tried Tylenol, ibuprofen, and hydrocodone with not much benefit.     FEVERS: Reports 3-4 sporadic  episodes per month of self limited fevers of 38 C that occur during the evenings and associate facial flushing.     HEADACHES: occur daily and are bitemporal and irradiate occipitally, pulsatile in nature, intensity varies from intense to mild, are worsened with movement. On treatment with ibuprofen and somatriptan without any positive results.     VISION CHANGES: Patient reports that suddenly she would only see a gray blotch in her right eyes and would persist like this for a day and a half. Also reports blurriness in her left eye. Presence of pain and burning sensation of eyeball with associated redness. Has changed prescription glasses in the matter of 2 years 3 times. She has had opthalmology exam and was not suggestive of any evidence of uveitis.   She was also evaluated in ED for a dizzy spell.     ABD PAIN W/ INTERMITTENT DIARRHEA AND CONSTIPATION: Patient reports abdominal pain that is intermittent, periods of 7 days without bowel movement and feeling bloated with change of pattern to diarrhea (liquidy without blood nor mucus, non foul smelling). Has taken Bentyl, Zegrid, and rinetidine with mild clinical improvement.  She also reports small red nodules after each needle stick for blood draw.   Neurology saw her back then and was not impressed with her presentation as physical examination was normal. Also MRI brain normal: Findings: No definite hemorrhage, mass affect, midline shift, or ventriculomegaly is noted.There are a few scattered non specific white matter T2 hyperintensities. Axial diffusion weighted images are unremarkable.     The major vascular structures appear patent. There is opacification and severe mucosal thickening in the right maxillary sinus. The remaining paranasal sinuses, and mastoid air cells are clear. Orbits are unremarkable  She has + HSV-1 IGG. Seen ID. Negative for Herpes simplex virus culture. HSV IGM I/II COMBINATION SENT TO [facility]  RESULTS = <0.91  REF RANGE: <0.91 =  NEGATIVE  ID did not think HSV could explain her mouth and genital sores.     CRP within normal limits. HIV negative. CBC within normal limits; UA negative. Creatinine within normal limits   CYNDIE neg.  Antigliadin neg.   ANti TTG neg.   [facility] 2014: Colonoscopy and endoscopy neg; result not available. Not sure if biopsies were done.   Raynaud's phenomenon:  Onset: about 2013  Digits: all fingers  Digital ulcers: no  Color changes: white ---> purple and red  Duration of an attack: About couple of hours per mom  Pain during attack: not clear pain but bruise like feeling  Frequency of attacks: few times a month  Family history of Raynauds: no  GERD: very long time    No hemoptysis.   No miscarriages/ no thrombosis in past.   No family or personal history of psoriasis, ulcerative colitis or chron's disease.   No buttock pain or low back pain or stiffness in AM    Interim history:  Dec 2014- Multiple mouth ulcers and did not eat for 5 days. This coincided with periods. Colchicine 0.6mg PO BID started in Nov 2014.   Prednisone taper in Dec 20mg to 0 in 4 weeks. She also used magic mouthwash. Kenalog cream. After going to the ER, 3 days later her ulcers were better. She thinks it improved after the menstruation stopped.   LMP 3 weeks ago.   COLCHICINE HAS HELPED A LOT REDUCING THE INTENSITY OF MENSTRUATION ASSOCIATED ORAL AND VULVAR ULCERS.     Interim history: 6/15    Since last visit only two episodes of mouth sores- small sized 6   No genital ulcers since last visit.   Colchicine 1.2 mg in AM and 0.6mg in PM keeps her symptoms under control.     Admitted in 5/15:  Admission Diagnoses:  - LUE weakness  - spell  - hx of migraines  - hx of Tourette syndrome  - hx of Behcet's disease    Discharge Diagnoses:   - spell likely 2/2 anxiety  - hx of migraines  - hx of Tourette syndrome  - hx of Behcet's disease    CT and MRI brain was normal.     Seeing Dr. Lilliana Miramontes soon as outpatient.     Dr. Garcia did not find any  "intraocular inflammation.      Interm 10/30/2015  ED for migraine. Continues to see neuro - MRI brain without lesions noted. Saw GI - upper barium normal. May be considering repeat colo. Worsening lesions in mouth and genitals. Has had continuous lesion in mouth x 2 months. 2 genital ulcers. Had fracture of right sesamoid in foot. Continues to have low grade fevers. New folliculitis on chest, legs and arms.     Interim hx: 1/11/2016    Pt states that since last visit she continues to have oral ulcers and has noted more folliculitis-like lesions on her lower extremities.  She states that on her right lower leg she had a red macular spot that has since resolved.  She denies uveitis.  She states that the colchicine initially helped but then stopped working.  She takes 0.6 mg TID.  She stopped taking her prednisone last week as she feels like this hasn't helped.  She hasn't had any episodes of vaginal ulcers.      She saw GI who stated she has gastroparesis.  She is seeing Cardiology later this week for possible syncopal episodes.      Interim history 5/16  Mouth ulcers X 1 last month  Genital ulcers - none since last visit.     Bilateral MCPs pain, knee pain and low back pain +ve increased since last visit  Morning stiffness X 2 hours (increasing)   5-6/10    \"overall myalgia\" has gotten worse    C/o pseudofolliculitis lesion on anterior shins bilaterally worse    Interim history: (7/18/2016)  Patient has just started Humira for 2 doses on 7/1 and 7/15 and reported minimal improvement of her hip pain but otherwise, did not notice anything different.     She reported painful mouth ulcer once a month since last visit but noticed that it has been getting deeper.   Denied any genital ulcers.    Still has ongoing bilateral MCPs pain, knee pain but this has been intermittent and she did not have any much pain today. She reported 2-3 hours morning stiffness of both hands and pain score of 6/10 at her knees and 8/10 of her " hands but currently takes no pain medication except prn ativan which she takes when her muscle is really tight.     The only concern is that she gained weight even though she has not been eating much (eat once a day) and do exercises every day for 1 hour (with video of yoga, piljt). Her mother wonders if she needs to have some labs work up include sex hormone, thyroid, cortisol.    Interval history 9/14/16  Patient was started Humira at the last visit, patient reports worsening of skin ulcers since starting Humira and stopped taking Humura for the past 2 weeks. Patient reports oral and genital ulcers has been stable even before starting Humira and has not had any interval oral/genital ulcers. However she reports recurrent skin ulcers occurring on a daily basis that affects her chest and anterior legs. Patient also has stopped taking prednisone, she reports that she doesn't feel the prednisone helps, her last dose of prednisone was 6+ months ago. Patient also report worsening low back pain that sometimes causes her to limp.    In the interval patient also visited ED on 8/31/16 for left hand pain and what the patient describes as phlebitis, no medication or procedure was done and the patient was discharged with a splint. Patient also reported 1 episode of chest pressure that was associated with dyspnea and numbness of the left arm, she was shopping in a supermarket at the time of onset, and the pressure resolved after she took a nap.    Patient denies any infectious symptoms in the interval, no fever, chills, night sweat, cough, dysuria, denies eye pain or visual changes.    November 4, 2016  Oct - had one genital ulcer  Oral ulcers X 5- around menstruation time.   Knee pain- chronic on the left side  Dizziness spells - on and off; been to the ER for that in the past.   On and off migraines  July 2013 - s/p MPFL reconstruction plus LRL 7/2013  Morning stiffness in knee (left) X 1 hour    Severe jaw pain and chest  pain- patient wondering if this is Tourette's or esophageal spasms. Cardiac workup negative.   Fever     January 13, 2017  Yesterday in ER [facility] [place] with orthostatic syncope from dehydration.   Chest pain on and off still continues. Painful with deep breaths.   Oral ulcers - few minor ones lasting for one week;   One major one in roof of mouth lasting for about one week.   Knee pain +ve - reviewed the recent MRI with her orthopedic surgeon.   On and off migraines  otezla is approved. Still waiting to receive the meds.     March 31, 2017  Otezla current dose 15mg PO BID.   She is tolerating okay at this dose and is willing to increase the dose further up to 30mg BID.   Her joint pains are better on otezla. Knee pain is also better.   Back and neck pain +ve 8/10 when it is worse. Intramuscular botox injections were given on Monday in PMR.   2 minor genital ulcers in the interim- resolved.   Few oral ulcers in the interim- resolved.   Nasal ulcer - resolved.   Migraines on and off.   Nausea with otezla but improving.   Dizziness and blackouts on and off. She fell once and hurt her ankle. Wearing boot in left leg.   Complete ROS negative except for above    May 17, 2017  Tolerating otezla better. Not throwing up anymore. Current dose 20mg in AM and 30mg in PM (2nd week).   Patient thinks that her oral ulcers frequency and severity are improving with otezla. Also no genital ulcers in the interim.   Currently on doxycycline for bronchitis/?pneunomia. 4 more days left.     Joint pain history  2 weeks ago/ dx with pneumonia 1 week ago/  Involved joints: all over  Pain scale: 6.5/10   Wakes the patient from sleep : Yes  Morning stiffness: Yes for 120 minutes  Meds used:otezla,colchicine     Interim history  Since last visit:  1. Infections - Yes/ pneumonia  2. New symptoms/medical problem - Yes/ thinks she may have had a mini-seizure about 10 days ago ; did not seek medical attention; did not reoccur after that. Patient seeing  PCP today.  3. Any side effects from Rheum medications -vomiting a lot (resolved)  3. ER visits/Hospitalizations/surgeries - Yes  4. Last PCP visit: has an apt. today    Patient still getting double vision. Floaters present.     Therapist recommended psychiatry evaluation. Patient does not want to see psychaitry. Patient will see PCP today.          Past Medical History:     Past Medical History:   Diagnosis Date     Anxiety      Arthritis      Behcet's disease (H)      Cervical adenitis May 2010     Chronic abdominal pain      Constipation, chronic 1994     Gastro-oesophageal reflux disease      Gastroparesis      Migraines      Neuromuscular disorder (H)     fibramyalgia     Palpitations      Syncope      Tourette's      Past Surgical History:   Procedure Laterality Date     ARTHROSCOPY KNEE WITH PATELLAR REALIGNMENT  7/25/2013    Procedure: ARTHROSCOPY KNEE WITH PATELLAR REALIGNMENT;  Left Knee Arthroscopy, Medial Patellofemoral Ligament Reconstruction with Allograft  ;  Surgeon: Jennifer Acevedo MD;  Location:  OR     DENTAL SURGERY  1996    Teeth removal     ENDOSCOPY UPPER, COLONOSCOPY, COMBINED  2005     HC ESOPH/GAS REFLUX TEST W NASAL IMPED >1 HR N/A 2/15/2017    Procedure: ESOPHAGEAL IMPEDENCE FUNCTION TEST WITH 24 HOUR PH GREATER THAN 1 HOUR;  Surgeon: Timothy Matta MD;  Location:  GI          Social History:     Social History     Occupational History     Not on file.     Social History Main Topics     Smoking status: Never Smoker     Smokeless tobacco: Never Used     Alcohol use Yes      Comment: seldom     Drug use: No     Sexual activity: No          Family History:     Family History   Problem Relation Age of Onset     Depression Mother      Neurologic Disorder Mother      Migraines, take imitrex injection.  Also in maternal grandmother.       Alcohol/Drug Father      Hypertension Father      Depression Father      Cardiovascular Maternal Grandmother      Depression Maternal  Grandmother      Hypertension Maternal Grandmother      Alzheimer Disease Maternal Grandmother      Cardiovascular Maternal Grandfather      Hypertension Maternal Grandfather      Depression Maternal Grandfather      Alcohol/Drug Maternal Grandfather      Cardiovascular Paternal Grandmother      Hypertension Paternal Grandmother      Cardiovascular Paternal Grandfather      Hypertension Paternal Grandfather      Glaucoma No family hx of      Macular Degeneration No family hx of           Allergies:     Allergies   Allergen Reactions     Amoxil [Penicillins] Rash     Dad unsure of reaction.     Contrast Dye Rash     Contrast Media Ready-Box [facility], 04/09/2014.; Contrast [facility], 04/09/2014.  NOTE: this is a contrast media oral with iodine. Premedicate with methylpred standard for IV contrast, request barium contrast for oral contrast.     Kiwi Swelling     Orange Fruit [Citrus] Anaphylaxis     Pineapple Anaphylaxis, Difficulty breathing and Rash     Milk Protein Extract Hives     Reglan [Metoclopramide] Other (See Comments)     IV dose only, in ER, rapid heart rate.     Ace Inhibitors      Difficulty in breathing and GI upset     Amitiza [Lubiprostone] Nausea and Vomiting     Amoxicillin-Pot Clavulanate      Latex      Midazolam Unknown     parent states that when pt takes this medication, she wakes up being very violent .     Versed      Coming out of pelvic exam at age of 5, was kicking and screaming when coming out of the versed.     Adhesive Tape Rash     Azithromycin Hives and Rash     Cephalexin Itching and Rash     Itchy mouth     Keflex [Cephalexin-Fd&C Yellow #6] Hives          Medications:     Current Outpatient Prescriptions   Medication Sig Dispense Refill     diclofenac (VOLTAREN) 1 % GEL topical gel Apply 2-4 grams to affected area(s) up to 4 times per day as needed. This is an anti-inflammatory medication. 100 g 4     benzonatate (TESSALON) 100 MG capsule Take 1-2 pills THREE TIMES PER DAY  as needed for cough 42 capsule 0     doxycycline (VIBRAMYCIN) 100 MG capsule Take 1 capsule (100 mg) by mouth 2 times daily 20 capsule 0     metaxalone (SKELAXIN) 800 MG tablet Take 0.5 tablets (400 mg) by mouth 3 times daily as needed for moderate pain 30 tablet 1     LORazepam (ATIVAN) 1 MG tablet Take 0.5 tablets (0.5 mg) by mouth every 6 hours as needed for other (abdominal pain) Do not operate a vehicle after taking this medication 90 tablet 1     ondansetron (ZOFRAN-ODT) 8 MG ODT tab Take 1 tablet (8 mg) by mouth every 8 hours as needed for nausea 60 tablet 11     aspirin (ASPIRIN CHILDRENS) 81 MG chewable tablet Take 81 mg by mouth daily       dicyclomine (BENTYL) 20 MG tablet Take 1 tablet (20 mg) by mouth 4 times daily as needed 60 tablet 1     amitriptyline (ELAVIL) 25 MG tablet Take 1 tablet (25 mg) by mouth At Bedtime 45 tablet 5     omeprazole (PRILOSEC) 40 MG capsule Take 1 capsule (40 mg) by mouth daily 90 capsule 3     EPINEPHrine (EPIPEN 2-EAMON) 0.3 MG/0.3ML injection Inject 0.3 mLs (0.3 mg) into the muscle as needed for anaphylaxis 0.6 mL 3     apremilast (OTEZLA) 30 MG tablet 20 mg in AM and 30mg in PM daily X 2 weeks and then 30mg twice daily. 60 tablet 3     ondansetron (ZOFRAN-ODT) 4 MG ODT tab Take 1 tablet (4 mg) by mouth every 6 hours as needed for nausea 20 tablet 0     Colchicine 0.6 MG CAPS Take 0.6 mg by mouth See Admin Instructions 2 capsules in AM and 1 capsule in PM 90 capsule 3     dexamethasone (DECADRON) 4 MG/ML injection Apply 1 mL (4 mg) topically as needed 30 mL 0     OnabotulinumtoxinA (BOTOX IJ) Inject 162.5 Units as directed once Lot #: /C3  Exp: 10/2019       guanFACINE (TENEX) 1 MG tablet 3 tablets in the morning and 3 tablets in the evening 180 tablet 3     norgestimate-ethinyl estradiol (ORTHO-CYCLEN, SPRINTEC) 0.25-35 MG-MCG per tablet Take 1 tablet by mouth daily 84 tablet 3     albuterol (PROAIR HFA/PROVENTIL HFA/VENTOLIN HFA) 108 (90 BASE) MCG/ACT Inhaler Inhale 2  puffs into the lungs every 6 hours as needed for shortness of breath / dyspnea or wheezing 1 Inhaler 1     OnabotulinumtoxinA (BOTOX IJ) Inject 150 Units into the muscle Lot # /C3  Exp: 8/2019       SUMAtriptan (IMITREX) 100 MG tablet Take 1 tablet (100 mg) by mouth at onset of headache for migraine May repeat in 2 hours. Max 2 tablets/24 hours. 18 tablet 3     promethazine (PHENERGAN) 25 MG tablet Take 0.5-1 tablets (12.5-25 mg) by mouth every 6 hours as needed for nausea 20 tablet 1     sucralfate (CARAFATE) 1 GM/10ML suspension Take 10 mLs (1 g) by mouth 4 times daily 1200 mL 2     meclizine (ANTIVERT) 25 MG tablet Take 1 tablet (25 mg) by mouth every 6 hours as needed for dizziness 30 tablet 1     Magic Mouthwash (FV std formula) lidocaine visc 2% 2.5mL/5mL & maalox/mylanta w/ simeth 2.5mL/5mL & diphenhydrAMINE 5mg/5mL Swish and swallow 10 mLs in mouth every 6 hours as needed for mouth sores 1 Bottle 1     clobetasol (TEMOVATE) 0.05 % cream Apply topically 2 times daily 60 g 0     OnabotulinumtoxinA (BOTOX IJ) Inject 150 Units into the muscle once Lot: /C3 Exp: 05/2019       botulinum toxin type A (BOTOX) 100 UNITS injection Inject 200 Units into the muscle every 3 months 200 Units 3     linaclotide (LINZESS) 290 MCG capsule Take 1 capsule (290 mcg) by mouth every morning (before breakfast) 30 capsule 1     hyoscyamine (ANASPAZ,LEVSIN) 0.125 MG tablet Take 1-2 tablets (125-250 mcg) by mouth every 4 hours as needed for cramping 40 tablet 1     Aspirin-Acetaminophen-Caffeine (EXCEDRIN MIGRAINE PO) Take by mouth as needed        hypromellose (GENTEAL) 0.3 % SOLN 1 drop every hour as needed       betamethasone valerate (VALISONE) 0.1 % cream Apply topically 2 times daily       carbamide peroxide (DEBROX) 6.5 % otic solution 5 drops 2 times daily       Lactase (LACTAID PO) Take by mouth daily       lidocaine (XYLOCAINE) 2 % jelly Apply 1 Tube topically daily Reported on 4/21/2017       triamcinolone  "(KENALOG) 0.1 % cream Apply sparingly to oral ulcers three times daily for 14 days as needed. 15 g 1     Ranitidine HCl (RANITIDINE 75 PO) Take  by mouth. As needed for GI upset            Physical Exam:   Blood pressure 94/72, pulse 107, temperature 98  F (36.7  C), temperature source Oral, height 5' 3\" (1.6 m), weight 149 lb (67.6 kg), SpO2 99 %, not currently breastfeeding.  Wt Readings from Last 4 Encounters:   05/17/17 149 lb (67.6 kg)   05/10/17 150 lb (68 kg)   05/10/17 150 lb (68 kg)   05/02/17 150 lb (68 kg)     Constitutional: well-developed, appearing stated age; cooperative  Eyes: nl EOM, PERRLA, vision, conjunctiva, sclera  ENT: No oral ulcer seen.   nl external ears, nose, hearing, lips, teeth, gums, throat  No mucous membrane lesions, normal saliva pool  Neck: no mass or thyroid enlargement  Resp: lungs clear to auscultation,   CV: RRR, no murmurs, rubs or gallops, no edema  GI: no ABD mass or tenderness, no HSM  : not tested  Lymph: no cervical, supraclavicular or epitrochlear nodes  MS: left ankle and lower leg anterior tenderness +ve  All shoulder, elbow, wrist, MCP/PIP/DIP, hip, ankle, and foot MTP/IP joints were examined and  found normal except tenderness on even light touch of both knees with minimal swelling but no warmth. No active synovitis or deformity. Full ROM.  Normal  strength. Fist 100%.  No dactylitis,  tenosynovitis, enthesopathy.  Skin: no nail pitting, alopecia, rash  Psych: nl judgement, orientation, memory, affect.         Data:     "

## 2017-05-16 NOTE — MR AVS SNAPSHOT
MRN:[id number]                      After Visit Summary   5/16/2017    Samara Oropeza    MRN: [id number]           Visit Information        Provider Department      5/16/2017 1:30 PM Hollie, ALISA Holloway [facility] Generic      Your next 10 appointments already scheduled     May 17, 2017 10:00 AM CDT   Return Visit with Austen Marquez MD   [facility] ([facility]    [address] [address]   [phone]            May 17, 2017  1:30 PM CDT   Office Visit with EVI Cardona CNP   [facility] ([facility])    [address] [place] [id number] [phone]           Bring a current list of meds and any records pertaining to this visit.  For Physicals, please bring immunization records and any forms needing to be filled out.  Please arrive 10 minutes early to complete paperwork.            May 19, 2017  2:40 PM CDT   [facility] with Cristy Pineda [facility]   [facility]    [address] [place] [id number] [phone]            May 30, 2017 10:30 AM CDT   Return Visit with ALISA Burt   [facility] ([facility])    [address] [address]   [phone]            Jun 06, 2017 10:30 AM CDT   [facility]   [facility] ([facility])    [address] [address]   [phone]            Jun 13, 2017 10:30 AM CDT   Return Visit with ALISA Burt   [facility] ([facility])    [address] [address]   [phone]            Jun 19,  "  8:00 AM CDT   New Visit with Kimo Hudson LP   [facility] ([facility])    [address]  [facility] [address]  [facility] [address] [phone]            2017  1:50 PM CDT   (Arrive by 1:35 PM)   Return Botox with Frederick Bender MD   [facility] ([facility])    [address] [facility]  3rd Floor  [facility] [id number] [phone]              MyChart Information     Mapbar lets you send messages to your doctor, view your test results, renew your prescriptions, schedule appointments and more. To sign up, go to [email or web address] . Click on \"Log in\" on the left side of the screen, which will take you to the Welcome page. Then click on \"Sign up Now\" on the right side of the page.     You will be asked to enter the access code listed below, as well as some personal information. Please follow the directions to create your username and password.     Your access code is: [email or web address]  Expires: 2017  1:39 PM     Your access code will  in 90 days. If you need help or a new code, please call your [place] clinic or [phone].        Care EveryWhere ID     This is your Care EveryWhere ID. This could be used by other organizations to access your [place] medical records  [id number]        "

## 2017-05-16 NOTE — PROGRESS NOTES
Progress Note    Client Name: Samara Oropeza  Date: 5/16/2017             Service Type: Individual      Session Start Time: 1:40  Session End Time: 2:30      Session Length: 45     Session #: 10     Attendees: Client attended alone    Treatment Plan Last Reviewed: 3/14/2017   PHQ-9 / TAHIR-7 : 5/9/2017      DATA      Progress Since Last Session (Related to Symptoms / Goals / Homework):   Symptoms: Stable    Homework: Partially completed- has been working on all of the tasks- continues to struggle with them helping with her symptoms      Episode of Care Goals: Satisfactory progress - ACTION (Actively working towards change); Intervened by reinforcing change plan / affirming steps taken     Current / Ongoing Stressors and Concerns:   Client was diagnosed with pneumonia and put on antibiotics for it.  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  Finances are a huge stressor, looking for apartments.  Talked about stressful situation with her father- feels that she did something wrong.  Reported feeling overwhelmed with her physical health and the effects that it is having on her loved one- parents and bf.       Treatment Objective(s) Addressed in This Session:   use cognitive strategies identified in therapy to challenge anxious thoughts  Increase restful sleep     Intervention:   CBT: refocus on self-care        ASSESSMENT: Current Emotional / Mental Status (status of significant symptoms):   Risk status (Self / Other harm or suicidal ideation)   Client denies current fears or concerns for personal safety.   Client denies current or recent suicidal ideation or behaviors.   Client denies current or recent homicidal ideation or behaviors.   Client denies current or recent self injurious behavior or ideation.   Client denies other safety concerns.   A safety and risk management plan has not been developed at this time, however client was given the  after-hours number / 911 should there be a change in any of these risk factors.     Appearance:   Appropriate    Eye Contact:   Fair    Psychomotor Behavior: Normal    Attitude:   Cooperative    Orientation:   All   Speech    Rate / Production: Monotone     Volume:  Soft    Mood:    Anxious    Affect:    Flat    Thought Content:  Clear    Thought Form:  Coherent  Logical    Insight:    Fair      Medication Review:   No current psychiatric medications prescribed     Medication Compliance:   NA     Changes in Health Issues:   Yes: Pain, Associated Psychological Distress     Chemical Use Review:   Substance Use: Chemical use reviewed, no active concerns identified      Tobacco Use: No current tobacco use.       Collateral Reports Completed:   Not Applicable    PLAN: (Client Tasks / Therapist Tasks / Other)  Homework:      1. Watch her thinking- reinforce her values and not her negative thinking patterns around family stress    2. Talk to PMD about psychiatry referral.          Layla Browne, [facility]                                                         ________________________________________________________________________    Treatment Plan    Client's Name: Samara Oropeza  YOB: 1995    Date: 3/14/2017     DSM-V Diagnoses: 296.32 Major Depressive Disorder, Recurrent Episode, Moderate _ or 300.02 (F41.1) Generalized Anxiety Disorder  Psychosocial / Contextual Factors: Moderate- Minimal contact with family, Health issues  WHODAS: 30    Referral / Collaboration:  Referral to another professional/service is not indicated at this time..    Anticipated number of session or this episode of care: 12      MeasurableTreatment Goal(s) related to diagnosis / functional impairment(s)  Goal 1: Client will decrease anxiety symptoms as eveidenced by self-report and TAHIR-7 scores, currently at 14, goal to 7 or below    I will know I've met my goal when I am better able to deal with it.      Objective  #A (Client Action)    Client will use relaxation strategies 3x times per day to reduce the physical symptoms of anxiety.  Status: New - Date: 3/14/17     Intervention(s)  Therapist will teach different relaxation strategies and have client complete one between session.    Objective #B  Client will use cognitive strategies identified in therapy to challenge anxious thoughts.  Status: New - Date: 3/14/17     Intervention(s)  Therapist will 1.  introduce cognitive distortions; 2. build awareness for client; 3. leanr an implement diffferent cognitive restructuring techniques.    Objective #C  Client will increase her amount of restful sleep.  Status: New - Date: 3/14/17     Intervention(s)  Therapist will 1. start a sleep journal; 2. Introduce techniques for falling and staying asleep.        Client has reviewed and agreed to the above plan.      Layla Browne, [facility]  March 14, 2017

## 2017-05-17 ENCOUNTER — OFFICE VISIT (OUTPATIENT)
Dept: FAMILY MEDICINE | Facility: CLINIC | Age: 22
End: 2017-05-17
Payer: COMMERCIAL

## 2017-05-17 ENCOUNTER — OFFICE VISIT (OUTPATIENT)
Dept: INTERNAL MEDICINE | Facility: CLINIC | Age: 22
End: 2017-05-17
Payer: COMMERCIAL

## 2017-05-17 VITALS
HEIGHT: 63 IN | BODY MASS INDEX: 26.4 KG/M2 | SYSTOLIC BLOOD PRESSURE: 94 MMHG | DIASTOLIC BLOOD PRESSURE: 72 MMHG | WEIGHT: 149 LBS | TEMPERATURE: 98 F | OXYGEN SATURATION: 99 % | HEART RATE: 107 BPM

## 2017-05-17 VITALS
SYSTOLIC BLOOD PRESSURE: 94 MMHG | TEMPERATURE: 98 F | OXYGEN SATURATION: 99 % | BODY MASS INDEX: 26.39 KG/M2 | DIASTOLIC BLOOD PRESSURE: 72 MMHG | WEIGHT: 149 LBS | HEART RATE: 107 BPM

## 2017-05-17 DIAGNOSIS — G89.29 CHRONIC ABDOMINAL PAIN: ICD-10-CM

## 2017-05-17 DIAGNOSIS — R10.9 CHRONIC ABDOMINAL PAIN: ICD-10-CM

## 2017-05-17 DIAGNOSIS — F41.8 SITUATIONAL ANXIETY: ICD-10-CM

## 2017-05-17 DIAGNOSIS — R11.0 NAUSEA: ICD-10-CM

## 2017-05-17 DIAGNOSIS — H53.2 DOUBLE VISION: ICD-10-CM

## 2017-05-17 DIAGNOSIS — K29.60 BILE REFLUX GASTRITIS: ICD-10-CM

## 2017-05-17 DIAGNOSIS — K29.70 GASTRITIS, PRESENCE OF BLEEDING UNSPECIFIED, UNSPECIFIED CHRONICITY, UNSPECIFIED GASTRITIS TYPE: Primary | ICD-10-CM

## 2017-05-17 DIAGNOSIS — M35.2 BEHCET'S DISEASE (H): Primary | ICD-10-CM

## 2017-05-17 DIAGNOSIS — R19.7 DIARRHEA, UNSPECIFIED TYPE: ICD-10-CM

## 2017-05-17 DIAGNOSIS — R11.2 NAUSEA AND VOMITING, INTRACTABILITY OF VOMITING NOT SPECIFIED, UNSPECIFIED VOMITING TYPE: ICD-10-CM

## 2017-05-17 PROCEDURE — 99214 OFFICE O/P EST MOD 30 MIN: CPT | Performed by: INTERNAL MEDICINE

## 2017-05-17 PROCEDURE — 99214 OFFICE O/P EST MOD 30 MIN: CPT | Performed by: NURSE PRACTITIONER

## 2017-05-17 RX ORDER — SUCRALFATE ORAL 1 G/10ML
1 SUSPENSION ORAL 4 TIMES DAILY
Qty: 1200 ML | Refills: 2 | Status: SHIPPED | OUTPATIENT
Start: 2017-05-17

## 2017-05-17 RX ORDER — ONDANSETRON 4 MG/1
4 TABLET, ORALLY DISINTEGRATING ORAL EVERY 6 HOURS PRN
Qty: 60 TABLET | Refills: 3 | Status: SHIPPED | OUTPATIENT
Start: 2017-05-17 | End: 2017-06-27 | Stop reason: DRUGHIGH

## 2017-05-17 ASSESSMENT — ROUTINE ASSESSMENT OF PATIENT INDEX DATA (RAPID3)
TOTAL RAPID3 SCORE: 16.5
RAPID3 INTERPRETATION: HIGH > 12.0

## 2017-05-17 NOTE — MR AVS SNAPSHOT
After Visit Summary   5/17/2017    Samara Oropeza    MRN: [id number]           Patient Information     Date Of Birth          1995        Visit Information        Provider Department      5/17/2017 1:30 PM Sonja Abreu APRN [facility]        Today's Diagnoses     Gastritis, presence of bleeding unspecified, unspecified chronicity, unspecified gastritis type    -  1    Diarrhea, unspecified type        Chronic abdominal pain        Nausea and vomiting, intractability of vomiting not specified, unspecified vomiting type        Bile reflux gastritis        Nausea           Follow-ups after your visit        Your next 10 appointments already scheduled     May 19, 2017  2:40 PM CDT   ULIZ Spine with Cristy Pineda \[facility]\""   [facility] ([facility] [place]  )    [address] [place] [id number] [phone]            May 30, 2017 10:30 AM CDT   [facility]    [address] [id number] [phone]            Jun 06, 2017 10:30 AM CDT   Return Visit with Layla Browne [facility] ([facility])    [address] [id number] [phone]            Jun 13, 2017 10:30 AM CDT   [facility]    [address] [id number] [phone]            Jun 19, 2017  8:00 AM CDT   New Visit with Kimo Hudson LP   [facility] ([facility])    [address]  [facility] [address]  [facility] [id number] [phone]            Jun 22, 2017  1:50 PM CDT   (Arrive by 1:35 PM)   Return Botox with Frederick Bender MD   [facility] ([facility])    [address] [facility]  3rd Floor  [facility] [id number] [phone]            Aug 22, 2017 11:00 AM CDT   Return Visit with Austen Marquez MD   [facility]    [address] [id number] [phone]              Future tests that were ordered for you today     Open Future Orders        Priority Expected Expires Ordered    Clostridium difficile Toxin B PCR Routine  6/17/2017 5/17/2017    Occult blood fecal HGB immuno Routine  5/17/2018 5/17/2017            Who to contact     If you have questions or need follow up information about today's clinic visit or your schedule please contact [facility] directly at [phone].  Normal or non-critical lab and imaging results will be communicated to you by MyChart, letter or phone within 4 business days after the clinic has received the results. If you do not hear from us within 7 days, please contact the clinic through [employer] or phone. If you have a critical or abnormal lab result, we will notify you by phone as soon as possible.  Submit refill requests through [employer] or call your pharmacy and they will forward the refill request to us. Please allow 3 business days for your refill to be completed.          Additional Information About Your Visit        MyChart Information     [employer] lets you send messages to your doctor, view your test results, renew your prescriptions, schedule appointments and more. To sign up, go to [email or web address] . Click on \"Log in\" on the left side of the screen, which will take you to the Welcome page. Then click on \"Sign up Now\" on the right side of the page.     You will be asked to enter the access code listed below, as well as some personal information. Please follow the directions to create your username and password.     Your access code is: " [email or web address]  Expires: 2017  1:39 PM     Your access code will  in 90 days. If you need help or a new code, please call your [place] clinic or [phone].        Care EveryWhere ID     This is your Care EveryWhere ID. This could be used by other organizations to access your [place] medical records  [id number]        Your Vitals Were     Pulse Temperature Pulse Oximetry BMI (Body Mass Index)          107 98  F (36.7  C) (Oral) 99% 26.39 kg/m2         Blood Pressure from Last 3 Encounters:   17 94/72   17 94/72   05/10/17 105/76    Weight from Last 3 Encounters:   17 149 lb (67.6 kg)   17 149 lb (67.6 kg)   05/10/17 150 lb (68 kg)                 Today's Medication Changes          These changes are accurate as of: 17  2:04 PM.  If you have any questions, ask your nurse or doctor.               These medicines have changed or have updated prescriptions.        Dose/Directions    apremilast 30 MG tablet   Commonly known as:  OTEZLA   This may have changed:  Another medication with the same name was removed. Continue taking this medication, and follow the directions you see here.   Used for:  Behcet's disease (H)   Changed by:  Austen Marquez MD        20 mg in AM and 30mg in PM daily X 2 weeks and then 30mg twice daily.   Quantity:  60 tablet   Refills:  3         Stop taking these medicines if you haven't already. Please contact your care team if you have questions.     lactobacillus rhamnosus (GG) capsule   Stopped by:  Austen Marquez MD           UNABLE TO FIND   Stopped by:  Austen Marquez MD           VITAMIN B6 PO   Stopped by:  Austen Marquez MD           vitamin D 2000 UNITS tablet   Stopped by:  Austen Marquez MD                Where to get your medicines      These medications were sent to [facility] - [address], [place],  [facility] [address]     Phone:  [phone]     ondansetron 4 MG ODT tab    sucralfate 1 GM/10ML suspension                Primary Care Provider Office Phone # Fax #    Sonja Abreu EVI SHAKA [phone]       [facility] [address]  [facility] [address]        Thank you!     Thank you for choosing [facility]  for your care. Our goal is always to provide you with excellent care. Hearing back from our patients is one way we can continue to improve our services. Please take a few minutes to complete the written survey that you may receive in the mail after your visit with us. Thank you!             Your Updated Medication List - Protect others around you: Learn how to safely use, store and throw away your medicines at www.disposemymeds.org.          This list is accurate as of: 5/17/17  2:04 PM.  Always use your most recent med list.                   Brand Name Dispense Instructions for use    albuterol 108 (90 BASE) MCG/ACT Inhaler    PROAIR HFA/PROVENTIL HFA/VENTOLIN HFA    1 Inhaler    Inhale 2 puffs into the lungs every 6 hours as needed for shortness of breath / dyspnea or wheezing       amitriptyline 25 MG tablet    ELAVIL    45 tablet    Take 1 tablet (25 mg) by mouth At Bedtime       apremilast 30 MG tablet    OTEZLA    60 tablet    20 mg in AM and 30mg in PM daily X 2 weeks and then 30mg twice daily.       ASPIRIN CHILDRENS 81 MG chewable tablet   Generic drug:  aspirin      Take 81 mg by mouth daily       benzonatate 100 MG capsule    TESSALON    42 capsule    Take 1-2 pills THREE TIMES PER DAY as needed for cough       betamethasone valerate 0.1 % cream    VALISONE     Apply topically 2 times daily       * botulinum toxin type A 100 UNITS injection    BOTOX    200 Units    Inject 200 Units into the muscle every 3 months       * BOTOX IJ      Inject 150 Units into the muscle once Lot: /C3 Exp: 05/2019       * BOTOX IJ      Inject 150 Units into  the muscle Lot # /C3  Exp: 8/2019       * BOTOX IJ      Inject 162.5 Units as directed once Lot #: /C3 Exp: 10/2019       carbamide peroxide 6.5 % otic solution    DEBROX     5 drops 2 times daily       clobetasol 0.05 % cream    TEMOVATE    60 g    Apply topically 2 times daily       Colchicine 0.6 MG Caps     90 capsule    Take 0.6 mg by mouth See Admin Instructions 2 capsules in AM and 1 capsule in PM       dexamethasone 4 MG/ML injection    DECADRON    30 mL    Apply 1 mL (4 mg) topically as needed       diclofenac 1 % Gel topical gel    VOLTAREN    100 g    Apply 2-4 grams to affected area(s) up to 4 times per day as needed. This is an anti-inflammatory medication.       dicyclomine 20 MG tablet    BENTYL    60 tablet    Take 1 tablet (20 mg) by mouth 4 times daily as needed       doxycycline 100 MG capsule    VIBRAMYCIN    20 capsule    Take 1 capsule (100 mg) by mouth 2 times daily       EPINEPHrine 0.3 MG/0.3ML injection    EPIPEN 2-EAMON    0.6 mL    Inject 0.3 mLs (0.3 mg) into the muscle as needed for anaphylaxis       EXCEDRIN MIGRAINE PO      Take by mouth as needed       guanFACINE 1 MG tablet    TENEX    180 tablet    3 tablets in the morning and 3 tablets in the evening       hyoscyamine 0.125 MG tablet    ANASPAZ/LEVSIN    40 tablet    Take 1-2 tablets (125-250 mcg) by mouth every 4 hours as needed for cramping       hypromellose 0.3 % Soln ophthalmic solution    GENTEAL     1 drop every hour as needed       LACTAID PO      Take by mouth daily       lidocaine 2 % topical gel    XYLOCAINE     Apply 1 Tube topically daily Reported on 4/21/2017       lidocaine visc 2% 2.5mL/5mL & maalox/mylanta w/ simeth 2.5mL/5mL & diphenhydrAMINE 5mg/5mL Susp suspension    [facility]    1 Bottle    Swish and swallow 10 mLs in mouth every 6 hours as needed for mouth sores       linaclotide 290 MCG capsule    LINZESS    30 capsule    Take 1 capsule (290 mcg) by mouth every morning (before  breakfast)       LORazepam 1 MG tablet    ATIVAN    90 tablet    Take 0.5 tablets (0.5 mg) by mouth every 6 hours as needed for other (abdominal pain) Do not operate a vehicle after taking this medication       meclizine 25 MG tablet    ANTIVERT    30 tablet    Take 1 tablet (25 mg) by mouth every 6 hours as needed for dizziness       metaxalone 800 MG tablet    SKELAXIN    30 tablet    Take 0.5 tablets (400 mg) by mouth 3 times daily as needed for moderate pain       norgestimate-ethinyl estradiol 0.25-35 MG-MCG per tablet    ORTHO-CYCLEN, SPRINTEC    84 tablet    Take 1 tablet by mouth daily       omeprazole 40 MG capsule    priLOSEC    90 capsule    Take 1 capsule (40 mg) by mouth daily       * ondansetron 8 MG ODT tab    ZOFRAN-ODT    60 tablet    Take 1 tablet (8 mg) by mouth every 8 hours as needed for nausea       * ondansetron 4 MG ODT tab    ZOFRAN-ODT    60 tablet    Take 1 tablet (4 mg) by mouth every 6 hours as needed for nausea       promethazine 25 MG tablet    PHENERGAN    20 tablet    Take 0.5-1 tablets (12.5-25 mg) by mouth every 6 hours as needed for nausea       RANITIDINE 75 PO      Take  by mouth. As needed for GI upset       sucralfate 1 GM/10ML suspension    CARAFATE    1200 mL    Take 10 mLs (1 g) by mouth 4 times daily       SUMAtriptan 100 MG tablet    IMITREX    18 tablet    Take 1 tablet (100 mg) by mouth at onset of headache for migraine May repeat in 2 hours. Max 2 tablets/24 hours.       triamcinolone 0.1 % cream    KENALOG    15 g    Apply sparingly to oral ulcers three times daily for 14 days as needed.       * Notice:  This list has 6 medication(s) that are the same as other medications prescribed for you. Read the directions carefully, and ask your doctor or other care provider to review them with you.

## 2017-05-17 NOTE — PROGRESS NOTES
SUBJECTIVE:                                                    Samara Oropeza is a 21 year old female who presents to clinic today for the following health issues:    CHEST PAIN     Onset: Has had in the past, symptoms went away for a while but came back    Description:   Location:  left side to center  Character: tight, squeezing   Radiation: epigastrium  Duration: Varies, can be 10 min to a few hours      Intensity: moderate to severe    Progression of Symptoms:  same    Accompanying Signs & Symptoms:  Shortness of breath: YES  Sweating: YES  Nausea/vomiting: YES  Lightheadedness: YES  Palpitations: YES  Fever/Chills: YES  Cough: YES- on antibiotics for pneumonia   Heartburn: no    History:   Family history of heart disease YES  Tobacco use: no    Precipitating factors:   Worse with exertion: no  Worse with deep breaths :  YES  Related to food: no    Alleviating factors:  Inhaler, drinking cold water, heating pad on chest.        Has been taking sucralfate once a day in the morning, which has been helpful for her stomach. Stomach has been worse since she has been taking doxycycline and her increased dose of Otezla for the past week. Eats very small meals, often going long times in between eating as she doesn't feel she can stomach it. Feels nauseous a lot. Has not been throwing up in the past few weeks, since throwing up blood at the hospital. Does not yet have follow-up scheduled with GI. Has been having ongoing dark diarrhea for the past month. Was last on antibiotics in January.  Is planning to see neurology regarding new episode of fainting/shaking. Last week felt pressure in head, and felt nauseous while she was riding in the car with boyfriend's parents. She got out of the car at the gas station, and started walking but was very unsteady on her feet- boyfriend's father carried her back to the car. She does not think she passed out, but she was shaking. Her mother checked her blood sugar, but it was  normal. She thinks she has had two other episodes like this of near-syncope.         Problem list and histories reviewed & adjusted, as indicated.  Additional history: as documented    Patient Active Problem List   Diagnosis     Tics - Tourette syndrome     IBS (irritable bowel syndrome)     Seasonal allergic rhinitis     Anxiety     Knee pain     Concussion     Milk protein intolerance     Headaches due to old head injury     Behcet's disease (H)     Migraine     Spell of shaking     On colchicine therapy     Palpitations     Fibromyalgia     Rheumatoid arthritis of multiple sites without rheumatoid factor (H)     Raynaud's disease without gangrene     Chronic abdominal pain     Left knee pain     Patellofemoral instability     PTSD (post-traumatic stress disorder)     Cervical dystonia     Acute left ankle pain     Cervical pain     Past Surgical History:   Procedure Laterality Date     ARTHROSCOPY KNEE WITH PATELLAR REALIGNMENT  7/25/2013    Procedure: ARTHROSCOPY KNEE WITH PATELLAR REALIGNMENT;  Left Knee Arthroscopy, Medial Patellofemoral Ligament Reconstruction with Allograft  ;  Surgeon: Jennifer Acevedo MD;  Location:  OR     DENTAL SURGERY  1996    Teeth removal     ENDOSCOPY UPPER, COLONOSCOPY, COMBINED  2005     HC ESOPH/GAS REFLUX TEST W NASAL IMPED >1 HR N/A 2/15/2017    Procedure: ESOPHAGEAL IMPEDENCE FUNCTION TEST WITH 24 HOUR PH GREATER THAN 1 HOUR;  Surgeon: Timothy Matta MD;  Location:  GI       Social History   Substance Use Topics     Smoking status: Never Smoker     Smokeless tobacco: Never Used     Alcohol use Yes      Comment: seldom     Family History   Problem Relation Age of Onset     Depression Mother      Neurologic Disorder Mother      Migraines, take imitrex injection.  Also in maternal grandmother.       Alcohol/Drug Father      Hypertension Father      Depression Father      Cardiovascular Maternal Grandmother      Depression Maternal Grandmother      Hypertension  Maternal Grandmother      Alzheimer Disease Maternal Grandmother      Cardiovascular Maternal Grandfather      Hypertension Maternal Grandfather      Depression Maternal Grandfather      Alcohol/Drug Maternal Grandfather      Cardiovascular Paternal Grandmother      Hypertension Paternal Grandmother      Cardiovascular Paternal Grandfather      Hypertension Paternal Grandfather      Glaucoma No family hx of      Macular Degeneration No family hx of          Current Outpatient Prescriptions   Medication Sig Dispense Refill     ondansetron (ZOFRAN-ODT) 4 MG ODT tab Take 1 tablet (4 mg) by mouth every 6 hours as needed for nausea 60 tablet 3     sucralfate (CARAFATE) 1 GM/10ML suspension Take 10 mLs (1 g) by mouth 4 times daily 1200 mL 2     diclofenac (VOLTAREN) 1 % GEL topical gel Apply 2-4 grams to affected area(s) up to 4 times per day as needed. This is an anti-inflammatory medication. 100 g 4     benzonatate (TESSALON) 100 MG capsule Take 1-2 pills THREE TIMES PER DAY as needed for cough 42 capsule 0     doxycycline (VIBRAMYCIN) 100 MG capsule Take 1 capsule (100 mg) by mouth 2 times daily 20 capsule 0     metaxalone (SKELAXIN) 800 MG tablet Take 0.5 tablets (400 mg) by mouth 3 times daily as needed for moderate pain 30 tablet 1     LORazepam (ATIVAN) 1 MG tablet Take 0.5 tablets (0.5 mg) by mouth every 6 hours as needed for other (abdominal pain) Do not operate a vehicle after taking this medication 90 tablet 1     ondansetron (ZOFRAN-ODT) 8 MG ODT tab Take 1 tablet (8 mg) by mouth every 8 hours as needed for nausea 60 tablet 11     aspirin (ASPIRIN CHILDRENS) 81 MG chewable tablet Take 81 mg by mouth daily       dicyclomine (BENTYL) 20 MG tablet Take 1 tablet (20 mg) by mouth 4 times daily as needed 60 tablet 1     amitriptyline (ELAVIL) 25 MG tablet Take 1 tablet (25 mg) by mouth At Bedtime 45 tablet 5     omeprazole (PRILOSEC) 40 MG capsule Take 1 capsule (40 mg) by mouth daily 90 capsule 3     EPINEPHrine  (EPIPEN 2-EAMON) 0.3 MG/0.3ML injection Inject 0.3 mLs (0.3 mg) into the muscle as needed for anaphylaxis 0.6 mL 3     apremilast (OTEZLA) 30 MG tablet 20 mg in AM and 30mg in PM daily X 2 weeks and then 30mg twice daily. 60 tablet 3     Colchicine 0.6 MG CAPS Take 0.6 mg by mouth See Admin Instructions 2 capsules in AM and 1 capsule in PM 90 capsule 3     dexamethasone (DECADRON) 4 MG/ML injection Apply 1 mL (4 mg) topically as needed 30 mL 0     OnabotulinumtoxinA (BOTOX IJ) Inject 162.5 Units as directed once Lot #: /C3  Exp: 10/2019       guanFACINE (TENEX) 1 MG tablet 3 tablets in the morning and 3 tablets in the evening 180 tablet 3     norgestimate-ethinyl estradiol (ORTHO-CYCLEN, SPRINTEC) 0.25-35 MG-MCG per tablet Take 1 tablet by mouth daily 84 tablet 3     albuterol (PROAIR HFA/PROVENTIL HFA/VENTOLIN HFA) 108 (90 BASE) MCG/ACT Inhaler Inhale 2 puffs into the lungs every 6 hours as needed for shortness of breath / dyspnea or wheezing 1 Inhaler 1     OnabotulinumtoxinA (BOTOX IJ) Inject 150 Units into the muscle Lot # /C3  Exp: 8/2019       SUMAtriptan (IMITREX) 100 MG tablet Take 1 tablet (100 mg) by mouth at onset of headache for migraine May repeat in 2 hours. Max 2 tablets/24 hours. 18 tablet 3     promethazine (PHENERGAN) 25 MG tablet Take 0.5-1 tablets (12.5-25 mg) by mouth every 6 hours as needed for nausea 20 tablet 1     meclizine (ANTIVERT) 25 MG tablet Take 1 tablet (25 mg) by mouth every 6 hours as needed for dizziness 30 tablet 1     Magic Mouthwash (FV std formula) lidocaine visc 2% 2.5mL/5mL & maalox/mylanta w/ simeth 2.5mL/5mL & diphenhydrAMINE 5mg/5mL Swish and swallow 10 mLs in mouth every 6 hours as needed for mouth sores 1 Bottle 1     clobetasol (TEMOVATE) 0.05 % cream Apply topically 2 times daily 60 g 0     OnabotulinumtoxinA (BOTOX IJ) Inject 150 Units into the muscle once Lot: /C3 Exp: 05/2019       botulinum toxin type A (BOTOX) 100 UNITS injection Inject 200 Units  into the muscle every 3 months 200 Units 3     linaclotide (LINZESS) 290 MCG capsule Take 1 capsule (290 mcg) by mouth every morning (before breakfast) 30 capsule 1     hyoscyamine (ANASPAZ,LEVSIN) 0.125 MG tablet Take 1-2 tablets (125-250 mcg) by mouth every 4 hours as needed for cramping 40 tablet 1     Aspirin-Acetaminophen-Caffeine (EXCEDRIN MIGRAINE PO) Take by mouth as needed        hypromellose (GENTEAL) 0.3 % SOLN 1 drop every hour as needed       betamethasone valerate (VALISONE) 0.1 % cream Apply topically 2 times daily       carbamide peroxide (DEBROX) 6.5 % otic solution 5 drops 2 times daily       Lactase (LACTAID PO) Take by mouth daily       lidocaine (XYLOCAINE) 2 % jelly Apply 1 Tube topically daily Reported on 4/21/2017       triamcinolone (KENALOG) 0.1 % cream Apply sparingly to oral ulcers three times daily for 14 days as needed. 15 g 1     Ranitidine HCl (RANITIDINE 75 PO) Take  by mouth. As needed for GI upset         Reviewed and updated as needed this visit by clinical staff  Allergies  Meds       Reviewed and updated as needed this visit by Provider         ROS:  Constitutional, HEENT, cardiovascular, pulmonary, gi and gu systems are negative, except as otherwise noted.    OBJECTIVE:                                                    BP 94/72  Pulse 107  Temp 98  F (36.7  C) (Oral)  Wt 149 lb (67.6 kg)  SpO2 99%  BMI 26.39 kg/m2  Body mass index is 26.39 kg/(m^2).  GENERAL: healthy, alert and no distress  EYES: Eyes grossly normal to inspection, PERRL and conjunctivae and sclerae normal  HENT: ear canals and TM's normal, nose and mouth without ulcers or lesions  NECK: no adenopathy, no asymmetry, masses, or scars and thyroid normal to palpation  RESP: lungs clear to auscultation - no rales, rhonchi or wheezes  CV: regular rate and rhythm, normal S1 S2, no S3 or S4, no murmur, click or rub, no peripheral edema and peripheral pulses strong  ABDOMEN: soft, without hepatosplenomegaly or  masses, tenderness epigastric and bowel sounds normal  MS: no gross musculoskeletal defects noted, no edema  PSYCH: mentation appears normal, affect normal/bright    Diagnostic Test Results:  No results found for this or any previous visit (from the past 24 hour(s)).     ASSESSMENT/PLAN:                                                      Problem List Items Addressed This Visit     Chronic abdominal pain    Relevant Medications    ondansetron (ZOFRAN-ODT) 4 MG ODT tab      Other Visit Diagnoses     Gastritis, presence of bleeding unspecified, unspecified chronicity, unspecified gastritis type    -  Primary    Relevant Medications    ondansetron (ZOFRAN-ODT) 4 MG ODT tab    Other Relevant Orders    Clostridium difficile Toxin B PCR    Occult blood fecal HGB immuno    Diarrhea, unspecified type        Relevant Orders    Clostridium difficile Toxin B PCR    Nausea and vomiting, intractability of vomiting not specified, unspecified vomiting type        Relevant Medications    ondansetron (ZOFRAN-ODT) 4 MG ODT tab    Bile reflux gastritis        Relevant Medications    sucralfate (CARAFATE) 1 GM/10ML suspension    Nausea        Relevant Medications    sucralfate (CARAFATE) 1 GM/10ML suspension    Situational anxiety        Relevant Orders    MENTAL HEALTH REFERRAL         Discussed restarting sucralfate 4x daily, rather than just once daily, as she likely has some gastritis and ulceration from the medications along with limited PO intake. Encouraged her to follow up with GI.  Counselor has requested that she get a psychiatry consult- which she agrees to do, but is very opposed to adding medications for anxiety or depression, as she is already concerned about being on too many medications. I think it's reasonable to go to see if having a more clear psychiatric diagnosis helps with any self insight, but I agree that more medication is not necessarily helpful at this point.      EVI Cardona [facility]  [facility]  Please note greater than 50% of this 30 minute appointment were spent in counseling with the patient of the issues described above in the history of present illness and in the plan, including changing medications

## 2017-05-17 NOTE — NURSING NOTE
"Chief Complaint   Patient presents with     RECHECK       Initial BP 94/72 (BP Location: Left arm, Patient Position: Chair, Cuff Size: Adult Regular)  Pulse 107  Temp 98  F (36.7  C) (Oral)  Ht 1.6 m (5' 3\")  Wt 67.6 kg (149 lb)  SpO2 99%  BMI 26.39 kg/m2 Estimated body mass index is 26.39 kg/(m^2) as calculated from the following:    Height as of this encounter: 1.6 m (5' 3\").    Weight as of this encounter: 67.6 kg (149 lb).  Medication Reconciliation: complete    Joint pain history  Onset: 2 weeks ago/ dx with pneumonia 1 week ago/ developing lesions  Involved joints: all over  Pain scale:  6.5/10     Wakes the patient from sleep : Yes  Morning stiffness:Yes for 120 minutes  Meds used:otezla,colchicine    Interim history  Since last visit:  1. Infections - Yes/ pneumonia  2. New symptoms/medical problem - Yes/ thinks she may have had a siezure  3. Any side effects from Rheum medications -vomiting alot  3. ER visits/Hospitalizations/surgeries - Yes  4. Last PCP visit: has an apt. today  Wt Readings from Last 4 Encounters:   05/17/17 67.6 kg (149 lb)   05/10/17 68 kg (150 lb)   05/10/17 68 kg (150 lb)   05/02/17 68 kg (150 lb)     BP Readings from Last 3 Encounters:   05/17/17 94/72   05/10/17 105/76   05/10/17 105/67       "

## 2017-05-17 NOTE — NURSING NOTE
"Chief Complaint   Patient presents with     RECHECK       Initial BP 94/72  Pulse 107  Temp 98  F (36.7  C) (Oral)  Wt 149 lb (67.6 kg)  SpO2 99%  BMI 26.39 kg/m2 Estimated body mass index is 26.39 kg/(m^2) as calculated from the following:    Height as of an earlier encounter on 5/17/17: 5' 3\" (1.6 m).    Weight as of this encounter: 149 lb (67.6 kg).  Medication Reconciliation: complete     Lupe Schwarz CMA    "

## 2017-05-17 NOTE — MR AVS SNAPSHOT
After Visit Summary   5/17/2017    Samara Oropeza    MRN: [id number]           Patient Information     Date Of Birth          1995        Visit Information        Provider Department      5/17/2017 10:00 AM Austen Marquez MD [facility]         Follow-ups after your visit        Follow-up notes from your care team     Return in about 3 months (around 8/17/2017).      Your next 10 appointments already scheduled     May 17, 2017  1:30 PM CDT   Office Visit with EVI Cardona CNP   [facility] ([facility])    [address] [place] [id number] [phone]           Bring a current list of meds and any records pertaining to this visit.  For Physicals, please bring immunization records and any forms needing to be filled out.  Please arrive 10 minutes early to complete paperwork.            May 19, 2017  2:40 PM CDT   LUIZ Spine with Cristy Pineda [facility]   [facility] ([facility]  )    [address] [place] [address]   [phone]            May 30, 2017 10:30 AM CDT   Return Visit with ALISA Burt   [facility] ([address] [address]   [phone]            Jun 06, 2017 10:30 AM CDT   Return Visit with ALISA Burt   [facility] ([facility])    [address] [place] [address]   [phone]            Jun 13, 2017 10:30 AM CDT   Return Visit with ALISA Burt   [facility] ([address] [address]   [phone]            Jun 19, 2017  8:00 AM CDT   New Visit with Kimo Hudson LP   [facility] ([facility]    [address]  [facility] [address]   [phone]            2017  1:50 PM CDT   (Arrive by 1:35 PM)   Return Botox with Frederick Bender MD   [facility] ([facility])    [address]  3rd Floor  [facility] [id number] [phone]              Who to contact     If you have questions or need follow up information about today's clinic visit or your schedule please contact [facility] directly at [phone].  Normal or non-critical lab and imaging results will be communicated to you by MyChart, letter or phone within 4 business days after the clinic has received the results. If you do not hear from us within 7 days, please contact the clinic through MyChart or phone. If you have a critical or abnormal lab result, we will notify you by phone as soon as possible.  Submit refill requests through [employer] or call your pharmacy and they will forward the refill request to us. Please allow 3 business days for your refill to be completed.          Additional Information About Your Visit        [employer] Information     [employer] lets you send messages to your doctor, view your test results, renew your prescriptions, schedule appointments and more. To sign up, go to [email or web address] . Click on \"Log in\" on the left side of the screen, which will take you to the Welcome page. Then click on \"Sign up Now\" on the right side of the page.     You will be asked to enter the access code listed below, as well as some personal information. Please follow the directions to create your username and password.     Your access code is: [email or web address]  Expires: 2017  1:39 PM     Your access code will  in 90 days. If you need help or a new code, please call your [facility] or [phone].        Care EveryWhere ID     This is your Care EveryWhere ID. This could be used by other " "organizations to access your [place] medical records  [id number]        Your Vitals Were     Pulse Temperature Height Pulse Oximetry BMI (Body Mass Index)       107 98  F (36.7  C) (Oral) 5' 3\" (1.6 m) 99% 26.39 kg/m2        Blood Pressure from Last 3 Encounters:   05/17/17 94/72   05/10/17 105/76   05/10/17 105/67    Weight from Last 3 Encounters:   05/17/17 149 lb (67.6 kg)   05/10/17 150 lb (68 kg)   05/10/17 150 lb (68 kg)              Today, you had the following     No orders found for display         Today's Medication Changes          These changes are accurate as of: 5/17/17 10:41 AM.  If you have any questions, ask your nurse or doctor.               Stop taking these medicines if you haven't already. Please contact your care team if you have questions.     lactobacillus rhamnosus (GG) capsule   Stopped by:  Austen Marquez MD           UNABLE TO FIND   Stopped by:  Austen Marquez MD           VITAMIN B6 PO   Stopped by:  Austen Marquez MD           vitamin D 2000 UNITS tablet   Stopped by:  Austen Marquez MD                    Primary Care Provider Office Phone # Fax #    Sonja EVI Laguerre [facility] [phone]       [facility] [address]  [facility] [address]        Thank you!     Thank you for choosing [facility]  for your care. Our goal is always to provide you with excellent care. Hearing back from our patients is one way we can continue to improve our services. Please take a few minutes to complete the written survey that you may receive in the mail after your visit with us. Thank you!             Your Updated Medication List - Protect others around you: Learn how to safely use, store and throw away your medicines at www.disposemymeds.org.          This list is accurate as of: 5/17/17 10:41 AM.  Always use your most recent med list.                   Brand Name Dispense Instructions for use "    albuterol 108 (90 BASE) MCG/ACT Inhaler    PROAIR HFA/PROVENTIL HFA/VENTOLIN HFA    1 Inhaler    Inhale 2 puffs into the lungs every 6 hours as needed for shortness of breath / dyspnea or wheezing       amitriptyline 25 MG tablet    ELAVIL    45 tablet    Take 1 tablet (25 mg) by mouth At Bedtime       * Apremilast 10 & 20 & 30 MG Tbpk    OTEZLA    27 tablet    Take one tablet in the morning on day 1, then take one tablet every 12 hours on days 2 thru 14, according to the instructions on the packet.       * apremilast 30 MG tablet    OTEZLA    60 tablet    20 mg in AM and 30mg in PM daily X 2 weeks and then 30mg twice daily.       ASPIRIN CHILDRENS 81 MG chewable tablet   Generic drug:  aspirin      Take 81 mg by mouth daily       benzonatate 100 MG capsule    TESSALON    42 capsule    Take 1-2 pills THREE TIMES PER DAY as needed for cough       betamethasone valerate 0.1 % cream    VALISONE     Apply topically 2 times daily       * botulinum toxin type A 100 UNITS injection    BOTOX    200 Units    Inject 200 Units into the muscle every 3 months       * BOTOX IJ      Inject 150 Units into the muscle once Lot: /C3 Exp: 05/2019       * BOTOX IJ      Inject 150 Units into the muscle Lot # /C3  Exp: 8/2019       * BOTOX IJ      Inject 162.5 Units as directed once Lot #: /C3 Exp: 10/2019       carbamide peroxide 6.5 % otic solution    DEBROX     5 drops 2 times daily       clobetasol 0.05 % cream    TEMOVATE    60 g    Apply topically 2 times daily       Colchicine 0.6 MG Caps     90 capsule    Take 0.6 mg by mouth See Admin Instructions 2 capsules in AM and 1 capsule in PM       dexamethasone 4 MG/ML injection    DECADRON    30 mL    Apply 1 mL (4 mg) topically as needed       diclofenac 1 % Gel topical gel    VOLTAREN    100 g    Apply 2-4 grams to affected area(s) up to 4 times per day as needed. This is an anti-inflammatory medication.       dicyclomine 20 MG tablet    BENTYL    60 tablet    Take 1  tablet (20 mg) by mouth 4 times daily as needed       doxycycline 100 MG capsule    VIBRAMYCIN    20 capsule    Take 1 capsule (100 mg) by mouth 2 times daily       EPINEPHrine 0.3 MG/0.3ML injection    EPIPEN 2-EAMON    0.6 mL    Inject 0.3 mLs (0.3 mg) into the muscle as needed for anaphylaxis       EXCEDRIN MIGRAINE PO      Take by mouth as needed       guanFACINE 1 MG tablet    TENEX    180 tablet    3 tablets in the morning and 3 tablets in the evening       hyoscyamine 0.125 MG tablet    ANASPAZ/LEVSIN    40 tablet    Take 1-2 tablets (125-250 mcg) by mouth every 4 hours as needed for cramping       hypromellose 0.3 % Soln ophthalmic solution    GENTEAL     1 drop every hour as needed       LACTAID PO      Take by mouth daily       lidocaine 2 % topical gel    XYLOCAINE     Apply 1 Tube topically daily Reported on 4/21/2017       lidocaine visc 2% 2.5mL/5mL & maalox/mylanta w/ simeth 2.5mL/5mL & diphenhydrAMINE 5mg/5mL Susp suspension    [facility]    1 Bottle    Swish and swallow 10 mLs in mouth every 6 hours as needed for mouth sores       linaclotide 290 MCG capsule    LINZESS    30 capsule    Take 1 capsule (290 mcg) by mouth every morning (before breakfast)       LORazepam 1 MG tablet    ATIVAN    90 tablet    Take 0.5 tablets (0.5 mg) by mouth every 6 hours as needed for other (abdominal pain) Do not operate a vehicle after taking this medication       meclizine 25 MG tablet    ANTIVERT    30 tablet    Take 1 tablet (25 mg) by mouth every 6 hours as needed for dizziness       metaxalone 800 MG tablet    SKELAXIN    30 tablet    Take 0.5 tablets (400 mg) by mouth 3 times daily as needed for moderate pain       norgestimate-ethinyl estradiol 0.25-35 MG-MCG per tablet    ORTHO-CYCLEN, SPRINTEC    84 tablet    Take 1 tablet by mouth daily       omeprazole 40 MG capsule    priLOSEC    90 capsule    Take 1 capsule (40 mg) by mouth daily       * ondansetron 4 MG ODT tab    ZOFRAN-ODT    20 tablet     Take 1 tablet (4 mg) by mouth every 6 hours as needed for nausea       * ondansetron 8 MG ODT tab    ZOFRAN-ODT    60 tablet    Take 1 tablet (8 mg) by mouth every 8 hours as needed for nausea       promethazine 25 MG tablet    PHENERGAN    20 tablet    Take 0.5-1 tablets (12.5-25 mg) by mouth every 6 hours as needed for nausea       RANITIDINE 75 PO      Take  by mouth. As needed for GI upset       sucralfate 1 GM/10ML suspension    CARAFATE    1200 mL    Take 10 mLs (1 g) by mouth 4 times daily       SUMAtriptan 100 MG tablet    IMITREX    18 tablet    Take 1 tablet (100 mg) by mouth at onset of headache for migraine May repeat in 2 hours. Max 2 tablets/24 hours.       triamcinolone 0.1 % cream    KENALOG    15 g    Apply sparingly to oral ulcers three times daily for 14 days as needed.       * Notice:  This list has 8 medication(s) that are the same as other medications prescribed for you. Read the directions carefully, and ask your doctor or other care provider to review them with you.

## 2017-05-19 ENCOUNTER — THERAPY VISIT (OUTPATIENT)
Dept: PHYSICAL THERAPY | Facility: CLINIC | Age: 22
End: 2017-05-19
Payer: COMMERCIAL

## 2017-05-19 ENCOUNTER — HOSPITAL ENCOUNTER (OUTPATIENT)
Facility: CLINIC | Age: 22
Setting detail: SPECIMEN
Discharge: HOME | End: 2017-05-19
Admitting: NURSE PRACTITIONER
Payer: COMMERCIAL

## 2017-05-19 DIAGNOSIS — M54.2 CERVICAL PAIN: ICD-10-CM

## 2017-05-19 PROCEDURE — 87493 C DIFF AMPLIFIED PROBE: CPT | Performed by: NURSE PRACTITIONER

## 2017-05-19 PROCEDURE — 97112 NEUROMUSCULAR REEDUCATION: CPT | Mod: GP

## 2017-05-19 PROCEDURE — 82274 ASSAY TEST FOR BLOOD FECAL: CPT | Performed by: NURSE PRACTITIONER

## 2017-05-19 PROCEDURE — 97110 THERAPEUTIC EXERCISES: CPT | Mod: GP

## 2017-05-19 PROCEDURE — 97140 MANUAL THERAPY 1/> REGIONS: CPT | Mod: GP

## 2017-05-19 NOTE — PROGRESS NOTES
Subjective:    HPI                    Objective:    System    Physical Exam    General     ROS    Assessment/Plan:      SUBJECTIVE  Subjective changes as noted by pt:   Past couple days has been really hard to turn head to the L, feels pinch type pain in base of neck. Going to the R no problem   Current pain level: 4/10     Changes in function:  Yes (See Goal flowsheet attached for changes in current functional level)     Adverse reaction to treatment or activity:  None    OBJECTIVE  Changes in objective findings:    Objective: Mod+ TP and tenderness L upper tap at base of neck. L cerv rot limited 50% prior to sot tissue work Afterwards, full L rotation. Good scapular retraction. Good effort today.      ASSESSMENT  Samara continues to require intervention to meet STG and LTG's: PT  Patient's symptoms are resolving.  Response to therapy has shown an improvement in  pain level, muscle control and function  Progress made towards STG/LTG?  Yes (See Goal flowsheet attached for updates on achievement of STG and LTG)    PLAN  Current treatment program is being advanced to more complex exercises.    PTA/ATC plan:  Will continue with present plan of care.    Please refer to the daily flowsheet for treatment today, total treatment time and time spent performing 1:1 timed codes.

## 2017-05-19 NOTE — MR AVS SNAPSHOT
After Visit Summary   5/19/2017    Samara Oropeza    MRN: [id number]           Patient Information     Date Of Birth          1995        Visit Information        Provider Department      5/19/2017 2:40 PM Cristy Pineda [facility]        Today's Diagnoses     Cervical pain           Follow-ups after your visit        Your next 10 appointments already scheduled     May 30, 2017 10:30 AM CDT   [facility]    [address] [address]   [phone]            Jun 06, 2017 10:30 AM CDT   Return Visit with Layla Browne [facility] ([facility])    [address] [address]   [phone]            Jun 13, 2017 10:30 AM CDT   [facility]    [address] [place] [address]   [phone]            Jun 19, 2017  8:00 AM CDT   New Visit with Kimo Hudson LP   [facility] ([facility])    [address] [place] [address]   [phone]            Jun 22, 2017  1:50 PM CDT   (Arrive by 1:35 PM)   Return Botox with Frederick Bender MD   [facility] ([facility])    [address] [place] [address] [phone]            Aug 22, 2017 11:00 AM CDT   Return Visit with Austen Marquez MD   [facility] ([facility])    [address] [place] [id number] [phone]              Who to contact     If you have questions or need follow up information about today's clinic visit or your  "schedule please contact [facility] directly at [phone].  Normal or non-critical lab and imaging results will be communicated to you by MyChart, letter or phone within 4 business days after the clinic has received the results. If you do not hear from us within 7 days, please contact the clinic through [employer] or phone. If you have a critical or abnormal lab result, we will notify you by phone as soon as possible.  Submit refill requests through [employer] or call your pharmacy and they will forward the refill request to us. Please allow 3 business days for your refill to be completed.          Additional Information About Your Visit        [employer] Information     [employer] lets you send messages to your doctor, view your test results, renew your prescriptions, schedule appointments and more. To sign up, go to [email or web address] . Click on \"Log in\" on the left side of the screen, which will take you to the Welcome page. Then click on \"Sign up Now\" on the right side of the page.     You will be asked to enter the access code listed below, as well as some personal information. Please follow the directions to create your username and password.     Your access code is: [email or web address]  Expires: 2017  1:39 PM     Your access code will  in 90 days. If you need help or a new code, please call your [place] clinic or [phone].        Care EveryWhere ID     This is your Care EveryWhere ID. This could be used by other organizations to access your [place] medical records  [id number]         Blood Pressure from Last 3 Encounters:   17 94/72   17 94/72   05/10/17 105/76    Weight from Last 3 Encounters:   17 67.6 kg (149 lb)   17 67.6 kg (149 lb)   05/10/17 68 kg (150 lb)              We Performed the Following     Manual Ther Tech, 1+Regions, EA 15 min     Neuromuscular Re-Education     Therapeutic Exercises        Primary Care Provider " Office Phone # Fax #    EVI Cardona [phone]       [facility] [address] [address]        Thank you!     Thank you for choosing [place] [facility]  for your care. Our goal is always to provide you with excellent care. Hearing back from our patients is one way we can continue to improve our services. Please take a few minutes to complete the written survey that you may receive in the mail after your visit with us. Thank you!             Your Updated Medication List - Protect others around you: Learn how to safely use, store and throw away your medicines at www.disposemymeds.org.          This list is accurate as of: 5/19/17  4:30 PM.  Always use your most recent med list.                   Brand Name Dispense Instructions for use    albuterol 108 (90 BASE) MCG/ACT Inhaler    PROAIR HFA/PROVENTIL HFA/VENTOLIN HFA    1 Inhaler    Inhale 2 puffs into the lungs every 6 hours as needed for shortness of breath / dyspnea or wheezing       amitriptyline 25 MG tablet    ELAVIL    45 tablet    Take 1 tablet (25 mg) by mouth At Bedtime       apremilast 30 MG tablet    OTEZLA    60 tablet    20 mg in AM and 30mg in PM daily X 2 weeks and then 30mg twice daily.       ASPIRIN CHILDRENS 81 MG chewable tablet   Generic drug:  aspirin      Take 81 mg by mouth daily       benzonatate 100 MG capsule    TESSALON    42 capsule    Take 1-2 pills THREE TIMES PER DAY as needed for cough       betamethasone valerate 0.1 % cream    VALISONE     Apply topically 2 times daily       * botulinum toxin type A 100 UNITS injection    BOTOX    200 Units    Inject 200 Units into the muscle every 3 months       * BOTOX IJ      Inject 150 Units into the muscle once Lot: /C3 Exp: 05/2019       * BOTOX IJ      Inject 150 Units into the muscle Lot # /C3  Exp: 8/2019       * BOTOX IJ      Inject 162.5 Units as directed once Lot #: /C3  Exp: 10/2019       carbamide peroxide 6.5 % otic solution    DEBROX     5 drops 2 times daily       clobetasol 0.05 % cream    TEMOVATE    60 g    Apply topically 2 times daily       Colchicine 0.6 MG Caps     90 capsule    Take 0.6 mg by mouth See Admin Instructions 2 capsules in AM and 1 capsule in PM       dexamethasone 4 MG/ML injection    DECADRON    30 mL    Apply 1 mL (4 mg) topically as needed       diclofenac 1 % Gel topical gel    VOLTAREN    100 g    Apply 2-4 grams to affected area(s) up to 4 times per day as needed. This is an anti-inflammatory medication.       dicyclomine 20 MG tablet    BENTYL    60 tablet    Take 1 tablet (20 mg) by mouth 4 times daily as needed       doxycycline 100 MG capsule    VIBRAMYCIN    20 capsule    Take 1 capsule (100 mg) by mouth 2 times daily       EPINEPHrine 0.3 MG/0.3ML injection    EPIPEN 2-EAMON    0.6 mL    Inject 0.3 mLs (0.3 mg) into the muscle as needed for anaphylaxis       EXCEDRIN MIGRAINE PO      Take by mouth as needed       guanFACINE 1 MG tablet    TENEX    180 tablet    3 tablets in the morning and 3 tablets in the evening       hyoscyamine 0.125 MG tablet    ANASPAZ/LEVSIN    40 tablet    Take 1-2 tablets (125-250 mcg) by mouth every 4 hours as needed for cramping       hypromellose 0.3 % Soln ophthalmic solution    GENTEAL     1 drop every hour as needed       LACTAID PO      Take by mouth daily       lidocaine 2 % topical gel    XYLOCAINE     Apply 1 Tube topically daily Reported on 4/21/2017       lidocaine visc 2% 2.5mL/5mL & maalox/mylanta w/ simeth 2.5mL/5mL & diphenhydrAMINE 5mg/5mL Susp suspension    [facility]    1 Bottle    Swish and swallow 10 mLs in mouth every 6 hours as needed for mouth sores       linaclotide 290 MCG capsule    LINZESS    30 capsule    Take 1 capsule (290 mcg) by mouth every morning (before breakfast)       LORazepam 1 MG tablet    ATIVAN    90 tablet    Take 0.5 tablets (0.5 mg) by mouth every 6 hours as  needed for other (abdominal pain) Do not operate a vehicle after taking this medication       meclizine 25 MG tablet    ANTIVERT    30 tablet    Take 1 tablet (25 mg) by mouth every 6 hours as needed for dizziness       metaxalone 800 MG tablet    SKELAXIN    30 tablet    Take 0.5 tablets (400 mg) by mouth 3 times daily as needed for moderate pain       norgestimate-ethinyl estradiol 0.25-35 MG-MCG per tablet    ORTHO-CYCLEN, SPRINTEC    84 tablet    Take 1 tablet by mouth daily       omeprazole 40 MG capsule    priLOSEC    90 capsule    Take 1 capsule (40 mg) by mouth daily       * ondansetron 8 MG ODT tab    ZOFRAN-ODT    60 tablet    Take 1 tablet (8 mg) by mouth every 8 hours as needed for nausea       * ondansetron 4 MG ODT tab    ZOFRAN-ODT    60 tablet    Take 1 tablet (4 mg) by mouth every 6 hours as needed for nausea       promethazine 25 MG tablet    PHENERGAN    20 tablet    Take 0.5-1 tablets (12.5-25 mg) by mouth every 6 hours as needed for nausea       RANITIDINE 75 PO      Take  by mouth. As needed for GI upset       sucralfate 1 GM/10ML suspension    CARAFATE    1200 mL    Take 10 mLs (1 g) by mouth 4 times daily       SUMAtriptan 100 MG tablet    IMITREX    18 tablet    Take 1 tablet (100 mg) by mouth at onset of headache for migraine May repeat in 2 hours. Max 2 tablets/24 hours.       triamcinolone 0.1 % cream    KENALOG    15 g    Apply sparingly to oral ulcers three times daily for 14 days as needed.       * Notice:  This list has 6 medication(s) that are the same as other medications prescribed for you. Read the directions carefully, and ask your doctor or other care provider to review them with you.

## 2017-05-21 DIAGNOSIS — K29.70 GASTRITIS, PRESENCE OF BLEEDING UNSPECIFIED, UNSPECIFIED CHRONICITY, UNSPECIFIED GASTRITIS TYPE: ICD-10-CM

## 2017-05-21 DIAGNOSIS — R19.7 DIARRHEA, UNSPECIFIED TYPE: ICD-10-CM

## 2017-05-21 LAB
C DIFF TOX B STL QL: NORMAL
HEMOCCULT STL QL IA: NEGATIVE
SPECIMEN SOURCE: NORMAL

## 2017-05-25 ENCOUNTER — TELEPHONE (OUTPATIENT)
Dept: PALLIATIVE MEDICINE | Facility: CLINIC | Age: 22
End: 2017-05-25

## 2017-05-25 NOTE — LETTER
"[facility]  [address]  [address]  [facility] [address]  [phone]      June 7, 2017      Samara QUICK Oropeza  [address]  [facility] [address]        To Whom It May Concern,    I am writing to you in regards to a prior authorization that was denied for Voltaren Gel. This     medication is medically necessary for the patients condition. The medication was recommended     and prescribed by the patients physician. The patient has tried and failed a number of     medications that she could not tolerate such as the list that I have provided.     Pain Treatments:  She may take Cyclobenzaprine at night. She notes that this may make her groggy or     feel sick if taken on an empty stomach; if she uses Cyclobenzaprine at night, she is     cautious about taking Metaxalone the next day. She was unable to tolerate Gabapentin     in the past, since this made her \"too sleepy\" and \"didn't agree with [her] body\". She     notes that Ativan helps with her stomach symptoms and anxiety. She is prescribed     Amitriptyline for sleep. Other medication trials are otherwise reported to include: Aspirin,     Tylenol, Ibuprofen, Naproxen, Indomethacin (allergic), Codeine (tired),Tramadol (sick),      Morphine (loopy and tired), Hydrocodone (violent vomiting), Oxycodone (sick), Fioricet,     Prednisone (didn't work), Sumatriptan injection/tablets/nasal spray (didn't help),     Gabapentin (bad reaction, too sleepy), Lyrica (didn't work), Topamax (didn't go well, bad     reaction), Clonazepam (sleepy), Hydroxyzine. Patient has multiple ailments that this     medication would be appropriate for. I provided a list of medical conditions for you.    Chronic atypical chest/abdominal pain, associated with nausea/vomiting     and constipation/diarrhea. Previous diagnosis of gastroparesis; 12/04/15     NM study consistent with delayed gastric emptying. Differential otherwise     includes GERD, functional abdominal " "pain, IBS, vs. other. 04/15/17 CT \\    abdomen/pelvis demonstrates mild, nonspecific prominence of several     mesenteric and upper retroperitoneal lymph nodes (stable), possible mural     stratification of the ascending colon, and simple left ovarian cyst. Work up   including upper endoscopy and colonoscopy has been unremarkable.     Widespread body pain in the setting of previous diagnosis of fibromyalgia.    Polyarthralgia. She reports previous diagnosis of \"rheumatoid arthritis\".     Behcet's syndrome, associated with painful skin ulcerations, folliculitis. She     has been followed by rheumatology. Current on Otezla and Colchicine. Off     Prednisone due to associated mood issues.    Chronic headache, with previous diagnosis of complicated migraine with aura.     She has been seen by EVI Olson CNP in the past.    Chronic neck and back pain. 11/12/16 MRI lumbar spine demonstrates     degenerative changes of the lower lumbar spine, including central disc     protrusion at L4-5 and L5-S1 (superimposed annular fissure), associated with     mild central stenosis. There is mild facet hypertrophy. Mild bilateral neural     foraminal narrowing is seen at L5-S1.    History of left knee pain s/p left knee arthroscopy, medial patellofemoral     ligament reconstruction with allograft, 07/25/13. 11/12/16 MRI left knee     demonstrates findings suggestive of patellofemoral maltracking. There is mild     free edge fraying of the anterior horn of the lateral meniscus. There is seen to     be loculated fluid adjacent to this area with mild enhancement, suggestive     of tiny parameniscal cyst, with question of focal area of synovitis.    More recent left foot/ankle injury, for which she is followed by podiatry.     04/26/17 MRI left ankle suggests mild strain of the distal soleus muscle, with     no muscle/tendon tear or ligament pathology identified.    I would appreciate reconsideration on this medication. The " patient would benefit from     the use of this medication as it would improve her quality of life. Thank you for     reconsidering.        Sincerely,                 Natalie Cha MD

## 2017-05-25 NOTE — TELEPHONE ENCOUNTER
Jeannette from [facility] at [facility] checking the status of a prior auth for diclofenac (VOLTAREN) 1 % GEL topical gel. Did not see any notes in regards to a prior auth. She states she faxed it on 5/15. She was faxing prior auth form to us again. Stated that insurance may just need a diagnosis code. Phone # for insurance is - [phone] ([employer]), Insurance ID is [id number]. Any questions for the pharmacy - Phone #[phone]    Mariya Moran    [facility]

## 2017-05-26 NOTE — TELEPHONE ENCOUNTER
Routing to [place] pool to manage prior authorization process for this.     Visit diagnosis from 5/10/17 office visit where this was prescribed:    Cervicalgia    -  1      Chronic pain of multiple sites         Chronic pain syndrome         Chronic abdominal pain         Chronic neck and back pain

## 2017-05-26 NOTE — TELEPHONE ENCOUNTER
Received fax from [facility] stating that Voltaren gel are/is not covered and a prior authorization is needed.     Plan: [employer]    Insurance phone: [phone]   Pt ID#: [id number]      Started a prior auth via Cover My Meds.    Mary [facility] [place]

## 2017-05-30 ENCOUNTER — OFFICE VISIT (OUTPATIENT)
Dept: BEHAVIORAL HEALTH | Facility: CLINIC | Age: 22
End: 2017-05-30
Payer: COMMERCIAL

## 2017-05-30 DIAGNOSIS — F43.10 PTSD (POST-TRAUMATIC STRESS DISORDER): ICD-10-CM

## 2017-05-30 DIAGNOSIS — M35.2 BEHCET'S DISEASE (H): ICD-10-CM

## 2017-05-30 DIAGNOSIS — F33.1 MODERATE EPISODE OF RECURRENT MAJOR DEPRESSIVE DISORDER (H): Primary | ICD-10-CM

## 2017-05-30 DIAGNOSIS — F41.1 GAD (GENERALIZED ANXIETY DISORDER): ICD-10-CM

## 2017-05-30 PROCEDURE — 90834 PSYTX W PT 45 MINUTES: CPT | Performed by: SOCIAL WORKER

## 2017-05-30 NOTE — PROGRESS NOTES
"                                             Progress Note    Client Name: Samara Oropeza  Date: 5/30/2017              Service Type: Individual      Session Start Time: 10:35  Session End Time: 11:30      Session Length: 45     Session #: 10     Attendees: Client attended alone    Treatment Plan Last Reviewed: 3/14/2017   PHQ-9 / TAHRI-7 : 5/9/2017      DATA      Progress Since Last Session (Related to Symptoms / Goals / Homework):   Symptoms: Stable    Homework: Partially completed- has been working on all of the tasks- continues to struggle with them helping with her symptoms      Episode of Care Goals: Satisfactory progress - ACTION (Actively working towards change); Intervened by reinforcing change plan / affirming steps taken     Current / Ongoing Stressors and Concerns:   Client reports that she had a couple of ED visits.  She spent time with her boyfriend's family for the weekend.  Client continues to struggle with sleep, having vivid dreams, and finances.  Client reports  that visit with father have been more anxious producing. Father was angry with her and they have stress with their relationship. Client reports that they have found a new apartment place for potentially  new place.  Anxious about potential ankle surgery- will find out when she meets with provider.       Treatment Objective(s) Addressed in This Session:   use cognitive strategies identified in therapy to challenge anxious thoughts  Increase restful sleep     Intervention:   CBT: refocus on self-care        ASSESSMENT: Current Emotional / Mental Status (status of significant symptoms):   Risk status (Self / Other harm or suicidal ideation)   Client denies current fears or concerns for personal safety.   Client denies current or recent suicidal ideation or behaviors. Had one day when passive SI thought- took extra medications to help with \"sleeping\"-Ativan, Amitriptlye, and older pills.  She did let her bf               know what she did.  " "Recognizes her stressors related to why she wanted to \"sleep\".    Client denies current or recent homicidal ideation or behaviors.   Client denies current or recent self injurious behavior or ideation.   Client denies other safety concerns.   A safety and risk management plan has not been developed at this time, however client was given the after-hours number / 911 should there be a change in any of these risk factors.     Appearance:   Appropriate    Eye Contact:   Fair    Psychomotor Behavior: Normal    Attitude:   Cooperative    Orientation:   All   Speech    Rate / Production: Monotone     Volume:  Soft    Mood:    Anxious    Affect:    Flat    Thought Content:  Clear    Thought Form:  Coherent  Logical    Insight:    Fair      Medication Review:   No current psychiatric medications prescribed     Medication Compliance:   NA     Changes in Health Issues:   Yes: Pain, Associated Psychological Distress     Chemical Use Review:   Substance Use: Chemical use reviewed, no active concerns identified      Tobacco Use: No current tobacco use.       Collateral Reports Completed:   Not Applicable    PLAN: (Client Tasks / Therapist Tasks / Other)  Homework:      1. Watch her thinking- reinforce her values and not her negative thinking patterns around family stress  2.  Keep focusing on self-care.      Layla Browne, [facility]                                                         ________________________________________________________________________    Treatment Plan    Client's Name: Samara Oropeza  YOB: 1995    Date: 3/14/2017     DSM-V Diagnoses: 296.32 Major Depressive Disorder, Recurrent Episode, Moderate _ or 300.02 (F41.1) Generalized Anxiety Disorder  Psychosocial / Contextual Factors: Moderate- Minimal contact with family, Health issues  WHODAS: 30    Referral / Collaboration:  Referral to another professional/service is not indicated at this time..    Anticipated number of " session or this episode of care: 12      MeasurableTreatment Goal(s) related to diagnosis / functional impairment(s)  Goal 1: Client will decrease anxiety symptoms as eveidenced by self-report and TAHIR-7 scores, currently at 14, goal to 7 or below    I will know I've met my goal when I am better able to deal with it.      Objective #A (Client Action)    Client will use relaxation strategies 3x times per day to reduce the physical symptoms of anxiety.  Status: New - Date: 3/14/17     Intervention(s)  Therapist will teach different relaxation strategies and have client complete one between session.    Objective #B  Client will use cognitive strategies identified in therapy to challenge anxious thoughts.  Status: New - Date: 3/14/17     Intervention(s)  Therapist will 1.  introduce cognitive distortions; 2. build awareness for client; 3. leanr an implement diffferent cognitive restructuring techniques.    Objective #C  Client will increase her amount of restful sleep.  Status: New - Date: 3/14/17     Intervention(s)  Therapist will 1. start a sleep journal; 2. Introduce techniques for falling and staying asleep.        Client has reviewed and agreed to the above plan.      Layla Browne, [facility]  March 14, 2017

## 2017-05-30 NOTE — MR AVS SNAPSHOT
"                  MRN:[id number]                      After Visit Summary   5/30/2017    Samara Oropeza    MRN: [id number]           Visit Information        Provider Department      5/30/2017 10:30 AM Layla Browne LICSW [facility] Generic      Your next 10 appointments already scheduled     Jun 06, 2017 10:30 AM CDT   [facility]    [address] [id number] [phone]            Jun 13, 2017 10:30 AM CDT   Return Visit with Layla Browne [facility] ([facility])    [address] [id number] [phone]            Jun 19, 2017  8:00 AM CDT   New Visit with Kimo Hudson LP   [facility] ([facility])    [address] [place] [id number] [phone]            Jun 22, 2017  1:50 PM CDT   (Arrive by 1:35 PM)   Return Botox with Frederick Bender MD   [facility] ([facility])    [address] [place] [address]  3rd [facility] [id number] [phone]            Jun 27, 2017 10:45 AM CDT   New Visit with Cata Hurst NP   [facility]    [address]  [address]  [facility] [id number] [phone]            Aug 22, 2017 11:00 AM CDT   Return Visit with Austen Marquez MD   [facility] ([facility])    [address] [id number] [phone]              MyChart Information     "[employer]hart lets you send messages to your doctor, view your test results, renew your prescriptions, schedule appointments and more. To sign up, go to [email or web address] . Click on \"Log " "in\" on the left side of the screen, which will take you to the Welcome page. Then click on \"Sign up Now\" on the right side of the page.     You will be asked to enter the access code listed below, as well as some personal information. Please follow the directions to create your username and password.     Your access code is: [email or web address]  Expires: 2017  1:39 PM     Your access code will  in 90 days. If you need help or a new code, please call your [facility] clinic or [phone].        Care EveryWhere ID     This is your Care EveryWhere ID. This could be used by other organizations to access your [facility] medical records  [id number]        "

## 2017-06-01 NOTE — TELEPHONE ENCOUNTER
PA denied.  Reason given:      Patient does not meet PA criteria.  Patient notified:  Yes   Patient informed directly/PT. informed of right to appeal.    I will forward this to provider to see what the next step is.      Mary Elliott [facility] [place]

## 2017-06-02 ENCOUNTER — HOSPITAL ENCOUNTER (EMERGENCY)
Facility: CLINIC | Age: 22
Discharge: HOME | End: 2017-06-02
Attending: EMERGENCY MEDICINE | Admitting: EMERGENCY MEDICINE
Payer: COMMERCIAL

## 2017-06-02 VITALS
TEMPERATURE: 97.5 F | OXYGEN SATURATION: 100 % | SYSTOLIC BLOOD PRESSURE: 117 MMHG | RESPIRATION RATE: 26 BRPM | DIASTOLIC BLOOD PRESSURE: 85 MMHG | HEIGHT: 63 IN | WEIGHT: 146.6 LBS | HEART RATE: 138 BPM | BODY MASS INDEX: 25.98 KG/M2

## 2017-06-02 DIAGNOSIS — R55 SYNCOPE, UNSPECIFIED SYNCOPE TYPE: ICD-10-CM

## 2017-06-02 LAB
ALBUMIN UR-MCNC: NEGATIVE MG/DL
ANION GAP SERPL CALCULATED.3IONS-SCNC: 10 MMOL/L (ref 3–14)
APPEARANCE UR: CLEAR
BASOPHILS # BLD AUTO: 0 10E9/L (ref 0–0.2)
BASOPHILS NFR BLD AUTO: 0.2 %
BILIRUB UR QL STRIP: NEGATIVE
BUN SERPL-MCNC: 7 MG/DL (ref 7–30)
CALCIUM SERPL-MCNC: 9.4 MG/DL (ref 8.5–10.1)
CHLORIDE SERPL-SCNC: 107 MMOL/L (ref 94–109)
CO2 SERPL-SCNC: 25 MMOL/L (ref 20–32)
COLOR UR AUTO: ABNORMAL
CREAT SERPL-MCNC: 0.76 MG/DL (ref 0.52–1.04)
DIFFERENTIAL METHOD BLD: ABNORMAL
EOSINOPHIL # BLD AUTO: 0.1 10E9/L (ref 0–0.7)
EOSINOPHIL NFR BLD AUTO: 3 %
ERYTHROCYTE [DISTWIDTH] IN BLOOD BY AUTOMATED COUNT: 13.4 % (ref 10–15)
GFR SERPL CREATININE-BSD FRML MDRD: ABNORMAL ML/MIN/1.7M2
GLUCOSE SERPL-MCNC: 86 MG/DL (ref 70–99)
GLUCOSE UR STRIP-MCNC: NEGATIVE MG/DL
HCG SERPL QL: NEGATIVE
HCT VFR BLD AUTO: 36.7 % (ref 35–47)
HGB BLD-MCNC: 12.3 G/DL (ref 11.7–15.7)
HGB UR QL STRIP: ABNORMAL
IMM GRANULOCYTES # BLD: 0 10E9/L (ref 0–0.4)
IMM GRANULOCYTES NFR BLD: 0.2 %
KETONES UR STRIP-MCNC: NEGATIVE MG/DL
LEUKOCYTE ESTERASE UR QL STRIP: NEGATIVE
LYMPHOCYTES # BLD AUTO: 2.5 10E9/L (ref 0.8–5.3)
LYMPHOCYTES NFR BLD AUTO: 58.1 %
MCH RBC QN AUTO: 29.1 PG (ref 26.5–33)
MCHC RBC AUTO-ENTMCNC: 33.5 G/DL (ref 31.5–36.5)
MCV RBC AUTO: 87 FL (ref 78–100)
MONOCYTES # BLD AUTO: 0.3 10E9/L (ref 0–1.3)
MONOCYTES NFR BLD AUTO: 5.8 %
MUCOUS THREADS #/AREA URNS LPF: PRESENT /LPF
NEUTROPHILS # BLD AUTO: 1.4 10E9/L (ref 1.6–8.3)
NEUTROPHILS NFR BLD AUTO: 32.7 %
NITRATE UR QL: NEGATIVE
NRBC # BLD AUTO: 0 10*3/UL
NRBC BLD AUTO-RTO: 0 /100
PH UR STRIP: 6 PH (ref 5–7)
PLATELET # BLD AUTO: 190 10E9/L (ref 150–450)
POTASSIUM SERPL-SCNC: 3.3 MMOL/L (ref 3.4–5.3)
RBC # BLD AUTO: 4.22 10E12/L (ref 3.8–5.2)
RBC #/AREA URNS AUTO: 1 /HPF (ref 0–2)
SODIUM SERPL-SCNC: 142 MMOL/L (ref 133–144)
SP GR UR STRIP: 1 (ref 1–1.03)
URN SPEC COLLECT METH UR: ABNORMAL
UROBILINOGEN UR STRIP-MCNC: NORMAL MG/DL (ref 0–2)
WBC # BLD AUTO: 4.3 10E9/L (ref 4–11)
WBC #/AREA URNS AUTO: <1 /HPF (ref 0–2)

## 2017-06-02 PROCEDURE — 85025 COMPLETE CBC W/AUTO DIFF WBC: CPT | Performed by: EMERGENCY MEDICINE

## 2017-06-02 PROCEDURE — 93005 ELECTROCARDIOGRAM TRACING: CPT | Performed by: EMERGENCY MEDICINE

## 2017-06-02 PROCEDURE — 80048 BASIC METABOLIC PNL TOTAL CA: CPT | Performed by: EMERGENCY MEDICINE

## 2017-06-02 PROCEDURE — 27210995 ZZH RX 272

## 2017-06-02 PROCEDURE — 96374 THER/PROPH/DIAG INJ IV PUSH: CPT | Performed by: EMERGENCY MEDICINE

## 2017-06-02 PROCEDURE — 25000128 H RX IP 250 OP 636: Performed by: EMERGENCY MEDICINE

## 2017-06-02 PROCEDURE — 93010 ELECTROCARDIOGRAM REPORT: CPT | Mod: Z6 | Performed by: EMERGENCY MEDICINE

## 2017-06-02 PROCEDURE — 81001 URINALYSIS AUTO W/SCOPE: CPT | Performed by: EMERGENCY MEDICINE

## 2017-06-02 PROCEDURE — 99285 EMERGENCY DEPT VISIT HI MDM: CPT | Mod: 25 | Performed by: EMERGENCY MEDICINE

## 2017-06-02 PROCEDURE — 96374 THER/PROPH/DIAG INJ IV PUSH: CPT

## 2017-06-02 PROCEDURE — 99285 EMERGENCY DEPT VISIT HI MDM: CPT | Mod: 25

## 2017-06-02 PROCEDURE — 40000556 ZZH STATISTIC PERIPHERAL IV START W US GUIDANCE

## 2017-06-02 PROCEDURE — 84703 CHORIONIC GONADOTROPIN ASSAY: CPT | Performed by: EMERGENCY MEDICINE

## 2017-06-02 RX ORDER — LORAZEPAM 2 MG/ML
0.5 INJECTION INTRAMUSCULAR ONCE
Status: COMPLETED | OUTPATIENT
Start: 2017-06-02 | End: 2017-06-02

## 2017-06-02 RX ORDER — LORAZEPAM 2 MG/ML
1 INJECTION INTRAMUSCULAR ONCE
Status: DISCONTINUED | OUTPATIENT
Start: 2017-06-02 | End: 2017-06-02

## 2017-06-02 RX ADMIN — LORAZEPAM 0.5 MG: 2 INJECTION INTRAMUSCULAR; INTRAVENOUS at 06:05

## 2017-06-02 NOTE — DISCHARGE INSTRUCTIONS
Please make an appointment to follow up with Cardiology Clinic (phone: [phone]) and Neurology Clinic (phone: [phone]) as soon as possible.        Possible Causes of Dizziness or Fainting  Dizziness and fainting can have many causes. Below are some examples of possible causes your health care provider will look to rule out.    Benign Paroxysmal Positional Vertigo (BPPV)  BPPV results when calcium crystals inside the inner ear shift into the wrong position. BPPV causes episodes of vertigo (a spinning sensation). Episodes most often occur when the head is moved in a certain way.  Infection or Inflammation  The semicircular canals of the ear may become infected or inflamed. In this case, they can send the wrong balance signals. This can cause vertigo.  Meniere s Disease  Meniere s disease happens when there is too much fluid in the semicircular canals. This can cause vertigo. It also can cause hearing problems and buzzing or ringing in the ears (called tinnitus). You may also have a feeling of pressure or fullness in the ear.  Syncope  Syncope is fainting that occurs when the brain doesn t get enough oxygen-rich blood. It can be caused by low heart rate or low blood pressure. This is called vasovagal syncope. It can also be caused by sitting or standing up too quickly. This is called orthostatic hypotension. Syncope may also be due to a heart valve problem, an abnormal heart rhythm, or other heart problems. Dizziness can also occur from stroke, hemorrhage in the brain, or other problems in the brain. Your doctor may do certain tests to rule out these conditions.  Other Causes  Other causes include:    Medications. Certain medications can cause dizziness and even fainting. In some cases, stopping a medication too quickly can lead to withdrawal symptoms, including dizziness and fainting.    Anxiety. Being anxious can lead to breathing changes, such as hyperventilation. These can lead to dizziness and  fainting.  Additional causes for dizziness and fainting also exist. Talk to your doctor for more information.          3166-6780 [employer]. [address] [place] [address], [place] [address]. All rights reserved. This information is not intended as a substitute for professional medical care. Always follow your healthcare professional's instructions.

## 2017-06-02 NOTE — ED AVS SNAPSHOT
[facility], [place], Emergency Department    [address] [place] [address]    Phone:  [phone]                                       Samara Oropeza   MRN: [id number]    Department:  [facility], Emergency Department   Date of Visit:  6/2/2017           After Visit Summary Signature Page     I have received my discharge instructions, and my questions have been answered. I have discussed any challenges I see with this plan with the nurse or doctor.    ..........................................................................................................................................  Patient/Patient Representative Signature      ..........................................................................................................................................  Patient Representative Print Name and Relationship to Patient    ..................................................               ................................................  Date                                            Time    ..........................................................................................................................................  Reviewed by Signature/Title    ...................................................              ..............................................  Date                                                            Time

## 2017-06-02 NOTE — ED NOTES
"Pt presents to ED with seizure like activity. Pt is accompanied by her significant other who states pt was using the restroom when she called out for him and when he ran into the room the patient was non responsive and her pupils were dilated. This lasted approx 2 mins before patient came to. Patient states prior to the event she felt really light headed and dizzy, \"and then everything became bright and my back went numb and things started spinning. I don't remember anything after that\". Pt states this is her third seizure like episode since last month. Pt states she has been suffering from black outs for about a year. Pt HR is 145 in triage. Pt is OX4 but lethargic  "

## 2017-06-02 NOTE — ED AVS SNAPSHOT
[facility], Emergency Department    [address] [facility] [place] [address]    Phone:  [phone]                                       Samara Oropeza   MRN: [id number]    Department:  [facility], Emergency Department   Date of Visit:  6/2/2017           Patient Information     Date Of Birth          1995        Your diagnoses for this visit were:     Syncope, unspecified syncope type        You were seen by Seyd Bailey MD.        Discharge Instructions       Please make an appointment to follow up with Cardiology Clinic (phone: [phone]) and Neurology Clinic (phone: [phone]) as soon as possible.        Possible Causes of Dizziness or Fainting  Dizziness and fainting can have many causes. Below are some examples of possible causes your health care provider will look to rule out.    Benign Paroxysmal Positional Vertigo (BPPV)  BPPV results when calcium crystals inside the inner ear shift into the wrong position. BPPV causes episodes of vertigo (a spinning sensation). Episodes most often occur when the head is moved in a certain way.  Infection or Inflammation  The semicircular canals of the ear may become infected or inflamed. In this case, they can send the wrong balance signals. This can cause vertigo.  Meniere s Disease  Meniere s disease happens when there is too much fluid in the semicircular canals. This can cause vertigo. It also can cause hearing problems and buzzing or ringing in the ears (called tinnitus). You may also have a feeling of pressure or fullness in the ear.  Syncope  Syncope is fainting that occurs when the brain doesn t get enough oxygen-rich blood. It can be caused by low heart rate or low blood pressure. This is called vasovagal syncope. It can also be caused by sitting or standing up too quickly. This is called orthostatic hypotension. Syncope may also be due to a heart valve problem, an abnormal heart rhythm, or other heart problems. Dizziness can  also occur from stroke, hemorrhage in the brain, or other problems in the brain. Your doctor may do certain tests to rule out these conditions.  Other Causes  Other causes include:    Medications. Certain medications can cause dizziness and even fainting. In some cases, stopping a medication too quickly can lead to withdrawal symptoms, including dizziness and fainting.    Anxiety. Being anxious can lead to breathing changes, such as hyperventilation. These can lead to dizziness and fainting.  Additional causes for dizziness and fainting also exist. Talk to your doctor for more information.          5756-2477 [employer]. [address] [place] [address]. All rights reserved. This information is not intended as a substitute for professional medical care. Always follow your healthcare professional's instructions.            Future Appointments        Provider Department Dept Phone Center    6/6/2017 10:30 AM Layla Browne [facility] [phone] [facility]    6/13/2017 10:30 AM Layla Browne [place] [phone] [facility]    6/19/2017 8:00 AM Kimo Hudson LP [facility] [phone] [facility]    6/22/2017 1:50 PM Frederick Bender MD [facility] [phone] [facility]    6/27/2017 10:45 AM Cata Hurst NP [facility] [phone] [place]    8/22/2017 11:00 AM Austen Marquez MD [facility] [phone]       24 Hour Appointment Hotline       To make an appointment at any [facility], call [phone] ([phone]). If you don't have a family doctor or clinic, we will help you find one. [facility] are conveniently located to serve the needs of you and your family.             Review of your medicines      Our records show that you are taking the medicines  listed below. If these are incorrect, please call your family doctor or clinic.        Dose / Directions Last dose taken    albuterol 108 (90 BASE) MCG/ACT Inhaler   Commonly known as:  PROAIR HFA/PROVENTIL HFA/VENTOLIN HFA   Dose:  2 puff   Quantity:  1 Inhaler        Inhale 2 puffs into the lungs every 6 hours as needed for shortness of breath / dyspnea or wheezing   Refills:  1        amitriptyline 25 MG tablet   Commonly known as:  ELAVIL   Dose:  25 mg   Quantity:  45 tablet        Take 1 tablet (25 mg) by mouth At Bedtime   Refills:  5        apremilast 30 MG tablet   Commonly known as:  OTEZLA   Quantity:  60 tablet        20 mg in AM and 30mg in PM daily X 2 weeks and then 30mg twice daily.   Refills:  3        ASPIRIN CHILDRENS 81 MG chewable tablet   Dose:  81 mg   Generic drug:  aspirin        Take 81 mg by mouth daily   Refills:  0        benzonatate 100 MG capsule   Commonly known as:  TESSALON   Quantity:  42 capsule        Take 1-2 pills THREE TIMES PER DAY as needed for cough   Refills:  0        betamethasone valerate 0.1 % cream   Commonly known as:  VALISONE        Apply topically 2 times daily   Refills:  0        * botulinum toxin type A 100 UNITS injection   Commonly known as:  BOTOX   Dose:  200 Units   Quantity:  200 Units        Inject 200 Units into the muscle every 3 months   Refills:  3        * BOTOX IJ   Dose:  150 Units        Inject 150 Units into the muscle once Lot: /C3 Exp: 05/2019   Refills:  0        * BOTOX IJ   Dose:  150 Units        Inject 150 Units into the muscle Lot # /C3  Exp: 8/2019   Refills:  0        * BOTOX IJ   Dose:  162.5 Units        Inject 162.5 Units as directed once Lot #: /C3 Exp: 10/2019   Refills:  0        carbamide peroxide 6.5 % otic solution   Commonly known as:  DEBROX   Dose:  5 drop        5 drops 2 times daily   Refills:  0        clobetasol 0.05 % cream   Commonly known as:  TEMOVATE   Quantity:  60 g        Apply topically 2 times  daily   Refills:  0        Colchicine 0.6 MG Caps   Dose:  1 capsule   Quantity:  90 capsule        Take 0.6 mg by mouth See Admin Instructions 2 capsules in AM and 1 capsule in PM   Refills:  3        dexamethasone 4 MG/ML injection   Commonly known as:  DECADRON   Dose:  4 mg   Quantity:  30 mL        Apply 1 mL (4 mg) topically as needed   Refills:  0        diclofenac 1 % Gel topical gel   Commonly known as:  VOLTAREN   Quantity:  100 g        Apply 2-4 grams to affected area(s) up to 4 times per day as needed. This is an anti-inflammatory medication.   Refills:  4        dicyclomine 20 MG tablet   Commonly known as:  BENTYL   Dose:  20 mg   Quantity:  60 tablet        Take 1 tablet (20 mg) by mouth 4 times daily as needed   Refills:  1        doxycycline 100 MG capsule   Commonly known as:  VIBRAMYCIN   Dose:  100 mg   Quantity:  20 capsule        Take 1 capsule (100 mg) by mouth 2 times daily   Refills:  0        EPINEPHrine 0.3 MG/0.3ML injection   Commonly known as:  EPIPEN 2-EAMON   Dose:  0.3 mg   Quantity:  0.6 mL        Inject 0.3 mLs (0.3 mg) into the muscle as needed for anaphylaxis   Refills:  3        EXCEDRIN MIGRAINE PO        Take by mouth as needed   Refills:  0        guanFACINE 1 MG tablet   Commonly known as:  TENEX   Quantity:  180 tablet        3 tablets in the morning and 3 tablets in the evening   Refills:  3        hyoscyamine 0.125 MG tablet   Commonly known as:  ANASPAZ/LEVSIN   Dose:  0.125-0.25 mg   Quantity:  40 tablet        Take 1-2 tablets (125-250 mcg) by mouth every 4 hours as needed for cramping   Refills:  1        hypromellose 0.3 % Soln ophthalmic solution   Commonly known as:  GENTEAL   Dose:  1 drop        1 drop every hour as needed   Refills:  0        LACTAID PO        Take by mouth daily   Refills:  0        lidocaine 2 % topical gel   Commonly known as:  XYLOCAINE   Dose:  1 Tube        Apply 1 Tube topically daily Reported on 4/21/2017   Refills:  0        lidocaine  visc 2% 2.5mL/5mL & maalox/mylanta w/ simeth 2.5mL/5mL & diphenhydrAMINE 5mg/5mL Susp suspension   Commonly known as:  MAGIC Mouthwash HOSPITAL   Dose:  10 mL   Quantity:  1 Bottle        Swish and swallow 10 mLs in mouth every 6 hours as needed for mouth sores   Refills:  1        linaclotide 290 MCG capsule   Commonly known as:  LINZESS   Dose:  290 mcg   Quantity:  30 capsule        Take 1 capsule (290 mcg) by mouth every morning (before breakfast)   Refills:  1        LORazepam 1 MG tablet   Commonly known as:  ATIVAN   Dose:  0.5 mg   Quantity:  90 tablet        Take 0.5 tablets (0.5 mg) by mouth every 6 hours as needed for other (abdominal pain) Do not operate a vehicle after taking this medication   Refills:  1        meclizine 25 MG tablet   Commonly known as:  ANTIVERT   Dose:  25 mg   Quantity:  30 tablet        Take 1 tablet (25 mg) by mouth every 6 hours as needed for dizziness   Refills:  1        metaxalone 800 MG tablet   Commonly known as:  SKELAXIN   Dose:  400 mg   Quantity:  30 tablet        Take 0.5 tablets (400 mg) by mouth 3 times daily as needed for moderate pain   Refills:  1        norgestimate-ethinyl estradiol 0.25-35 MG-MCG per tablet   Commonly known as:  ORTHO-CYCLEN, SPRINTEC   Dose:  1 tablet   Quantity:  84 tablet        Take 1 tablet by mouth daily   Refills:  3        omeprazole 40 MG capsule   Commonly known as:  priLOSEC   Dose:  40 mg   Quantity:  90 capsule        Take 1 capsule (40 mg) by mouth daily   Refills:  3        * ondansetron 8 MG ODT tab   Commonly known as:  ZOFRAN-ODT   Dose:  8 mg   Quantity:  60 tablet        Take 1 tablet (8 mg) by mouth every 8 hours as needed for nausea   Refills:  11        * ondansetron 4 MG ODT tab   Commonly known as:  ZOFRAN-ODT   Dose:  4 mg   Quantity:  60 tablet        Take 1 tablet (4 mg) by mouth every 6 hours as needed for nausea   Refills:  3        promethazine 25 MG tablet   Commonly known as:  PHENERGAN   Dose:  12.5-25 mg    Quantity:  20 tablet        Take 0.5-1 tablets (12.5-25 mg) by mouth every 6 hours as needed for nausea   Refills:  1        RANITIDINE 75 PO        Take  by mouth. As needed for GI upset   Refills:  0        sucralfate 1 GM/10ML suspension   Commonly known as:  CARAFATE   Dose:  1 g   Quantity:  1200 mL        Take 10 mLs (1 g) by mouth 4 times daily   Refills:  2        SUMAtriptan 100 MG tablet   Commonly known as:  IMITREX   Dose:  100 mg   Quantity:  18 tablet        Take 1 tablet (100 mg) by mouth at onset of headache for migraine May repeat in 2 hours. Max 2 tablets/24 hours.   Refills:  3        triamcinolone 0.1 % cream   Commonly known as:  KENALOG   Quantity:  15 g        Apply sparingly to oral ulcers three times daily for 14 days as needed.   Refills:  1        * Notice:  This list has 6 medication(s) that are the same as other medications prescribed for you. Read the directions carefully, and ask your doctor or other care provider to review them with you.            Procedures and tests performed during your visit     Basic metabolic panel    CBC with platelets differential    EKG 12 lead    HCG qualitative pregnancy (blood)    UA with Microscopic reflex to Culture    Vascular Access Care Adult IP Consult      Orders Needing Specimen Collection     None      Pending Results     Date and Time Order Name Status Description    6/2/2017 0818 EKG 12 lead Preliminary             Pending Culture Results     No orders found from 5/31/2017 to 6/3/2017.            Pending Results Instructions     If you had any lab results that were not finalized at the time of your Discharge, you can call the ED Lab Result RN at [phone]. You will be contacted by this team for any positive Lab results or changes in treatment. The nurses are available 7 days a week from 10A to 6:30P.  You can leave a message 24 hours per day and they will return your call.        Thank you for choosing [facility]       Thank you for choosing  "[facility] for your care. Our goal is always to provide you with excellent care. Hearing back from our patients is one way we can continue to improve our services. Please take a few minutes to complete the written survey that you may receive in the mail after you visit with us. Thank you!        [employer] [employer] Information     [employer] lets you send messages to your doctor, view your test results, renew your prescriptions, schedule appointments and more. To sign up, go to [email or web address] . Click on \"Log in\" on the left side of the screen, which will take you to the Welcome page. Then click on \"Sign up Now\" on the right side of the page.     You will be asked to enter the access code listed below, as well as some personal information. Please follow the directions to create your username and password.     Your access code is: [email or web address]  Expires: 2017  1:39 PM     Your access code will  in 90 days. If you need help or a new code, please call your [facility] clinic or [phone].        Care EveryWhere ID     This is your Care EveryWhere ID. This could be used by other organizations to access your [facility] medical records  [id number]        After Visit Summary       This is your record. Keep this with you and show to your community pharmacist(s) and doctor(s) at your next visit.                  "

## 2017-06-03 LAB — INTERPRETATION ECG - MUSE: NORMAL

## 2017-06-05 DIAGNOSIS — F95.9 TIC: ICD-10-CM

## 2017-06-05 NOTE — TELEPHONE ENCOUNTER
guanFACINE (TENEX) 1 MG tablet      Last Written Prescription Date: 2/1/2017  Last Fill Quantity: 180,  # refills: 3   Last Office Visit with G, P or [facility] prescribing provider: 5/17/2017                                         Next 5 appointments (look out 90 days)     Jun 06, 2017 10:30 AM CDT   Return Visit with ALISA Burt   [facility] ([facility])    [address] [place] [address]   [phone]            Jun 13, 2017 10:30 AM CDT   Return Visit with ALISA Burt   [facility] ([facility])    [address] [place] [id number] [phone]            Aug 22, 2017 11:00 AM CDT   Return Visit with Austen Marquez MD   [facility] ([address] [address]   [phone]                  Routing refill request to provider for review/approval because:  Drug not on the [facility] refill protocol     Thank you,  Wes Locke RN

## 2017-06-06 ENCOUNTER — APPOINTMENT (OUTPATIENT)
Dept: GENERAL RADIOLOGY | Facility: CLINIC | Age: 22
End: 2017-06-06
Attending: EMERGENCY MEDICINE
Payer: COMMERCIAL

## 2017-06-06 ENCOUNTER — OFFICE VISIT (OUTPATIENT)
Dept: BEHAVIORAL HEALTH | Facility: CLINIC | Age: 22
End: 2017-06-06
Payer: COMMERCIAL

## 2017-06-06 ENCOUNTER — HOSPITAL ENCOUNTER (EMERGENCY)
Facility: CLINIC | Age: 22
Discharge: HOME | End: 2017-06-06
Attending: EMERGENCY MEDICINE | Admitting: EMERGENCY MEDICINE
Payer: COMMERCIAL

## 2017-06-06 VITALS
TEMPERATURE: 98.4 F | WEIGHT: 147 LBS | SYSTOLIC BLOOD PRESSURE: 111 MMHG | HEART RATE: 91 BPM | HEIGHT: 63 IN | BODY MASS INDEX: 26.05 KG/M2 | OXYGEN SATURATION: 100 % | DIASTOLIC BLOOD PRESSURE: 72 MMHG | RESPIRATION RATE: 16 BRPM

## 2017-06-06 DIAGNOSIS — F41.1 GAD (GENERALIZED ANXIETY DISORDER): ICD-10-CM

## 2017-06-06 DIAGNOSIS — R10.84 ABDOMINAL PAIN, GENERALIZED: ICD-10-CM

## 2017-06-06 DIAGNOSIS — F43.10 PTSD (POST-TRAUMATIC STRESS DISORDER): ICD-10-CM

## 2017-06-06 DIAGNOSIS — M35.2 BEHCET'S DISEASE (H): ICD-10-CM

## 2017-06-06 DIAGNOSIS — F33.1 MODERATE EPISODE OF RECURRENT MAJOR DEPRESSIVE DISORDER (H): Primary | ICD-10-CM

## 2017-06-06 DIAGNOSIS — K59.00 CONSTIPATION, UNSPECIFIED CONSTIPATION TYPE: ICD-10-CM

## 2017-06-06 LAB
ALBUMIN SERPL-MCNC: 3.8 G/DL (ref 3.4–5)
ALBUMIN UR-MCNC: NEGATIVE MG/DL
ALP SERPL-CCNC: 91 U/L (ref 40–150)
ALT SERPL W P-5'-P-CCNC: 44 U/L (ref 0–50)
ANION GAP SERPL CALCULATED.3IONS-SCNC: 7 MMOL/L (ref 3–14)
APPEARANCE UR: CLEAR
AST SERPL W P-5'-P-CCNC: 26 U/L (ref 0–45)
BASOPHILS # BLD AUTO: 0 10E9/L (ref 0–0.2)
BASOPHILS NFR BLD AUTO: 0 %
BILIRUB SERPL-MCNC: 0.3 MG/DL (ref 0.2–1.3)
BILIRUB UR QL STRIP: NEGATIVE
BUN SERPL-MCNC: 5 MG/DL (ref 7–30)
CALCIUM SERPL-MCNC: 9.2 MG/DL (ref 8.5–10.1)
CHLORIDE SERPL-SCNC: 106 MMOL/L (ref 94–109)
CO2 SERPL-SCNC: 28 MMOL/L (ref 20–32)
COLOR UR AUTO: YELLOW
CREAT SERPL-MCNC: 0.77 MG/DL (ref 0.52–1.04)
DIFFERENTIAL METHOD BLD: NORMAL
EOSINOPHIL # BLD AUTO: 0.2 10E9/L (ref 0–0.7)
EOSINOPHIL NFR BLD AUTO: 3.2 %
ERYTHROCYTE [DISTWIDTH] IN BLOOD BY AUTOMATED COUNT: 13.1 % (ref 10–15)
GFR SERPL CREATININE-BSD FRML MDRD: ABNORMAL ML/MIN/1.7M2
GLUCOSE SERPL-MCNC: 82 MG/DL (ref 70–99)
GLUCOSE UR STRIP-MCNC: NEGATIVE MG/DL
HCG UR QL: NEGATIVE
HCT VFR BLD AUTO: 35.9 % (ref 35–47)
HGB BLD-MCNC: 12 G/DL (ref 11.7–15.7)
HGB UR QL STRIP: NEGATIVE
IMM GRANULOCYTES # BLD: 0 10E9/L (ref 0–0.4)
IMM GRANULOCYTES NFR BLD: 0 %
INTERNAL QC OK POCT: NO
KETONES UR STRIP-MCNC: NEGATIVE MG/DL
LEUKOCYTE ESTERASE UR QL STRIP: NEGATIVE
LYMPHOCYTES # BLD AUTO: 2.5 10E9/L (ref 0.8–5.3)
LYMPHOCYTES NFR BLD AUTO: 52 %
MCH RBC QN AUTO: 29.3 PG (ref 26.5–33)
MCHC RBC AUTO-ENTMCNC: 33.4 G/DL (ref 31.5–36.5)
MCV RBC AUTO: 88 FL (ref 78–100)
MONOCYTES # BLD AUTO: 0.3 10E9/L (ref 0–1.3)
MONOCYTES NFR BLD AUTO: 7 %
MUCOUS THREADS #/AREA URNS LPF: PRESENT /LPF
NEUTROPHILS # BLD AUTO: 1.8 10E9/L (ref 1.6–8.3)
NEUTROPHILS NFR BLD AUTO: 37.8 %
NITRATE UR QL: NEGATIVE
NRBC # BLD AUTO: 0 10*3/UL
NRBC BLD AUTO-RTO: 0 /100
PH UR STRIP: 7.5 PH (ref 5–7)
PLATELET # BLD AUTO: 189 10E9/L (ref 150–450)
POTASSIUM SERPL-SCNC: 3.6 MMOL/L (ref 3.4–5.3)
PROT SERPL-MCNC: 6.7 G/DL (ref 6.8–8.8)
RBC # BLD AUTO: 4.09 10E12/L (ref 3.8–5.2)
RBC #/AREA URNS AUTO: 0 /HPF (ref 0–2)
SODIUM SERPL-SCNC: 140 MMOL/L (ref 133–144)
SP GR UR STRIP: 1.01 (ref 1–1.03)
SQUAMOUS #/AREA URNS AUTO: <1 /HPF (ref 0–1)
URN SPEC COLLECT METH UR: ABNORMAL
UROBILINOGEN UR STRIP-MCNC: 0.2 MG/DL (ref 0–2)
WBC # BLD AUTO: 4.7 10E9/L (ref 4–11)
WBC #/AREA URNS AUTO: <1 /HPF (ref 0–2)

## 2017-06-06 PROCEDURE — 40000141 ZZH STATISTIC PERIPHERAL IV START W/O US GUIDANCE

## 2017-06-06 PROCEDURE — 74020 XR ABDOMEN 2 VW: CPT

## 2017-06-06 PROCEDURE — 81003 URINALYSIS AUTO W/O SCOPE: CPT | Performed by: EMERGENCY MEDICINE

## 2017-06-06 PROCEDURE — 85025 COMPLETE CBC W/AUTO DIFF WBC: CPT | Performed by: EMERGENCY MEDICINE

## 2017-06-06 PROCEDURE — 99284 EMERGENCY DEPT VISIT MOD MDM: CPT | Performed by: EMERGENCY MEDICINE

## 2017-06-06 PROCEDURE — 80053 COMPREHEN METABOLIC PANEL: CPT | Performed by: EMERGENCY MEDICINE

## 2017-06-06 PROCEDURE — 99284 EMERGENCY DEPT VISIT MOD MDM: CPT | Mod: Z6 | Performed by: EMERGENCY MEDICINE

## 2017-06-06 PROCEDURE — 90834 PSYTX W PT 45 MINUTES: CPT | Performed by: SOCIAL WORKER

## 2017-06-06 PROCEDURE — 81025 URINE PREGNANCY TEST: CPT | Performed by: EMERGENCY MEDICINE

## 2017-06-06 RX ORDER — LACTULOSE 20 G/30ML
30 SOLUTION ORAL 3 TIMES DAILY PRN
Qty: 300 ML | Refills: 0 | Status: SHIPPED | OUTPATIENT
Start: 2017-06-06 | End: 2017-10-11

## 2017-06-06 RX ORDER — LIDOCAINE HYDROCHLORIDE 10 MG/ML
INJECTION, SOLUTION EPIDURAL; INFILTRATION; INTRACAUDAL; PERINEURAL
Status: DISCONTINUED
Start: 2017-06-06 | End: 2017-06-07 | Stop reason: HOSPADM

## 2017-06-06 RX ORDER — GUANFACINE 1 MG/1
TABLET ORAL
Qty: 180 TABLET | Refills: 3 | Status: SHIPPED | OUTPATIENT
Start: 2017-06-06 | End: 2017-10-06

## 2017-06-06 ASSESSMENT — ANXIETY QUESTIONNAIRES
GAD7 TOTAL SCORE: 15
2. NOT BEING ABLE TO STOP OR CONTROL WORRYING: MORE THAN HALF THE DAYS
6. BECOMING EASILY ANNOYED OR IRRITABLE: MORE THAN HALF THE DAYS
5. BEING SO RESTLESS THAT IT IS HARD TO SIT STILL: MORE THAN HALF THE DAYS
3. WORRYING TOO MUCH ABOUT DIFFERENT THINGS: MORE THAN HALF THE DAYS
7. FEELING AFRAID AS IF SOMETHING AWFUL MIGHT HAPPEN: SEVERAL DAYS
1. FEELING NERVOUS, ANXIOUS, OR ON EDGE: NEARLY EVERY DAY

## 2017-06-06 ASSESSMENT — PATIENT HEALTH QUESTIONNAIRE - PHQ9: 5. POOR APPETITE OR OVEREATING: NEARLY EVERY DAY

## 2017-06-06 NOTE — ED NOTES
"Triage Assessment & Note:    /80  Pulse 91  Temp 98.4  F (36.9  C) (Oral)  Resp 18  Ht 1.6 m (5' 3\")  Wt 66.7 kg (147 lb)  LMP 06/01/2017  SpO2 99%  BMI 26.04 kg/m2    Patient presents with:RLQ abdominal pain. PT reports that her abdomen is getting bigger since last Friday.     Home Treatments/Remedies: None    Febrile / Afebrile? Afebrile    Duration of C/o: 5 days   Jeffrey Howell  June 6, 2017    '  "

## 2017-06-06 NOTE — MR AVS SNAPSHOT
MRN:[id number]                      After Visit Summary   6/6/2017    Samara Oropeza    MRN: [id number]           Visit Information        Provider Department      6/6/2017 10:30 AM Layla Browne [facility] Generic      Your next 10 appointments already scheduled     Jun 13, 2017 10:30 AM CDT   Return Visit with ALISA Burt   [facility] ([facility])    [address] [id number] [phone]            Jun 19, 2017  8:00 AM CDT   New Visit with Kimo Hudson LP   [facility] ([facility])    [address] [place], [place] [address]  [address] [address] [phone]            Jun 22, 2017  1:50 PM CDT   (Arrive by 1:35 PM)   Return Botox with Frederick Bender MD   [facility] ([facility])    [address] [facility]  [address] [facility] [address] [phone]            Jun 27, 2017 10:45 AM CDT   [facility] with Cata Hurst NP   [facility]    [address]  [address]  [facility] [id number] [phone]            Jun 27, 2017  2:30 PM CDT   Return Visit with ALISA Burt   [facility] ([facility])    [address] [id number] [phone]            Jul 11, 2017 10:30 AM CDT   Return Visit with Layla Browne [facility] ([facility])    [address] [id number] [phone]            Aug 22, 2017 11:00 AM CDT   Return Visit with Austen Marquez MD   [facility] ([facility])    [address] [address]  "  [phone]              [facility] Information     [employer] lets you send messages to your doctor, view your test results, renew your prescriptions, schedule appointments and more. To sign up, go to [email or web address] . Click on \"Log in\" on the left side of the screen, which will take you to the Welcome page. Then click on \"Sign up Now\" on the right side of the page.     You will be asked to enter the access code listed below, as well as some personal information. Please follow the directions to create your username and password.     Your access code is: [email or web address]  Expires: 2017  1:39 PM     Your access code will  in 90 days. If you need help or a new code, please call your [facility] clinic or [phone].        Care EveryWhere ID     This is your Care EveryWhere ID. This could be used by other organizations to access your [facility] medical records  [id number]        "

## 2017-06-06 NOTE — ED AVS SNAPSHOT
[facility] [place], Emergency Department    [address] [place] [address]    Phone:  [phone]                                       Samara Oropeza   MRN: [id number]    Department:  [facility], Emergency Department   Date of Visit:  6/6/2017           After Visit Summary Signature Page     I have received my discharge instructions, and my questions have been answered. I have discussed any challenges I see with this plan with the nurse or doctor.    ..........................................................................................................................................  Patient/Patient Representative Signature      ..........................................................................................................................................  Patient Representative Print Name and Relationship to Patient    ..................................................               ................................................  Date                                            Time    ..........................................................................................................................................  Reviewed by Signature/Title    ...................................................              ..............................................  Date                                                            Time

## 2017-06-06 NOTE — ED AVS SNAPSHOT
[facility], Emergency Department    [address] [address]    Phone:  [phone]                                       Samara Oropeza   MRN: [id number]    Department:  [facility], Emergency Department   Date of Visit:  6/6/2017           Patient Information     Date Of Birth          1995        Your diagnoses for this visit were:     Abdominal pain, generalized     Constipation, unspecified constipation type        You were seen by Felicia Taylor MD.        Discharge Instructions         Abdominal Pain  Abdominal pain is pain in the stomach or intestinal area. Everyone has this pain from time to time. In many cases it goes away on its own. But abdominal pain can sometimes be due to a serious problem, such as appendicitis. So it s important to know when to seek help.  Causes of abdominal pain  There are many possible causes of abdominal pain. Common causes in adults include:    Constipation, diarrhea, or gas    GERD (gastroesophageal reflux disease) movement of stomach acid into the esophagus, also known as acid reflux or heartburn    Peptic ulcer (a sore in the lining of the stomach or small intestine)    Inflammation of the gallbladder, liver, or pancreas    Gallstones or kidney stones    Appendicitis     Obstruction of the intestines     Hernia (bulging of an internal organ through a muscle or other tissue)    Urinary tract infections    In women, menstrual cramps, fibroids, or endometriosis of the uterus    Inflammation or infection of the intestines  Diagnosing the cause of abdominal pain  Your health care provider will examine you to help find the cause of your pain. If needed, tests will be ordered. Because abdominal pain has so many possible causes, it can be hard to discover the reason for the pain. Giving details about your pain can help. Be ready to tell your health care provider where and when you feel the pain and what makes it better or worse. Also mention  whether you have other symptoms such as fever, tiredness, nausea, vomiting, or changes in bathroom habits.  Treating abdominal pain  Certain causes of pain, such as appendicitis or a bowel obstruction, need emergency treatment. Other problems can be treated with rest, fluids, or medications. Your health care provider can give you specific instructions for treatment or self-care based on the cause of your pain.  If you have vomiting or diarrhea, sip water or other clear fluids. When you are ready to eat solid foods again, start with small amounts of easy-to-digest, low-fat foods, such as applesauce, toast, or crackers.   When to call the doctor  Call 911 or go to the hospital right away if you:    Can t pass stool and are vomiting    Are vomiting blood or have black, tarry diarrhea    Also have chest, neck, or shoulder pain    Feel like you are about to pass out    Have pain in your shoulder blades with nausea    Have sudden, excruciating abdominal pain    Have new, severe pain unlike any you have felt before    Have a belly that is rigid, hard, and tender to touch  Call your doctor if you have:    Pain for more than 5 days    Bloating for more than 2 days    Diarrhea for more than 5 days    Fever of 101 F (38.3 C) or higher    Pain that continues to worsen    Unexplained weight loss    Continued lack of appetite    Blood in the stool  How to prevent abdominal pain  Here are some tips to help prevent abdominal pain:    Eat smaller amounts of food at one time.    Avoid greasy, fried, or other high-fat foods.    Avoid foods that give you gas.    Exercise regularly.    Drink plenty of fluids.  To help prevent symptoms of gastroesophageal reflux disease (GERD):    Quit smoking.    Reduce alcohol and certain foods that increase stomach acid.     Lose excess weight.    Finish eating at least 2 hours before you go to bed or lie down.    Elevate the head of your bed.    0709-8251 [employer]. [address], [place] [address]. All rights reserved. This information is not intended as a substitute for professional medical care. Always follow your healthcare professional's instructions.    Continue with your normal stool meds. Add Lactulose, as discussed. I recommend you try an enema or suppository at home. Return to the ER with any worsening or concerns. Follow up with your clinic doctor in 2-3 days.       Discharge References/Attachments     CONSTIPATION (ADULT) (ENGLISH)      Future Appointments        Provider Department Dept Phone Center    6/13/2017 10:30 AM Layla Browne [facility] [phone] [facility]    6/19/2017 8:00 AM Kimo Hudson LP [facility] [phone] [facility]    6/22/2017 1:50 PM Frederick Bender MD [facility] [phone] [facility]    6/27/2017 10:45 AM Cata Hurst NP [place] [phone] [place]    6/27/2017 2:30 PM Layla Browne [facility] [phone] [facility]    7/11/2017 10:30 AM Layla Browne [place] [phone] [facility]    8/22/2017 11:00 AM Austen Marquez MD [facility] [phone]       24 Hour Appointment Hotline       To make an appointment at any [facility], call [phone] ([phone]). If you don't have a family doctor or clinic, we will help you find one. [facility] are conveniently located to serve the needs of you and your family.             Review of your medicines      START taking        Dose / Directions Last dose taken    lactulose 20 GM/30ML Soln   Dose:  30 mL   Quantity:  300 mL        Take 30 mLs by mouth 3 times daily as needed   Refills:  0          Our records show that you are taking the medicines listed below. If these are incorrect, please call  your family doctor or clinic.        Dose / Directions Last dose taken    albuterol 108 (90 BASE) MCG/ACT Inhaler   Commonly known as:  PROAIR HFA/PROVENTIL HFA/VENTOLIN HFA   Dose:  2 puff   Quantity:  1 Inhaler        Inhale 2 puffs into the lungs every 6 hours as needed for shortness of breath / dyspnea or wheezing   Refills:  1        amitriptyline 25 MG tablet   Commonly known as:  ELAVIL   Dose:  25 mg   Quantity:  45 tablet        Take 1 tablet (25 mg) by mouth At Bedtime   Refills:  5        apremilast 30 MG tablet   Commonly known as:  OTEZLA   Quantity:  60 tablet        20 mg in AM and 30mg in PM daily X 2 weeks and then 30mg twice daily.   Refills:  3        ASPIRIN CHILDRENS 81 MG chewable tablet   Dose:  81 mg   Generic drug:  aspirin        Take 81 mg by mouth daily   Refills:  0        benzonatate 100 MG capsule   Commonly known as:  TESSALON   Quantity:  42 capsule        Take 1-2 pills THREE TIMES PER DAY as needed for cough   Refills:  0        betamethasone valerate 0.1 % cream   Commonly known as:  VALISONE        Apply topically 2 times daily   Refills:  0        * botulinum toxin type A 100 UNITS injection   Commonly known as:  BOTOX   Dose:  200 Units   Quantity:  200 Units        Inject 200 Units into the muscle every 3 months   Refills:  3        * BOTOX IJ   Dose:  150 Units        Inject 150 Units into the muscle once Lot: /C3 Exp: 05/2019   Refills:  0        * BOTOX IJ   Dose:  150 Units        Inject 150 Units into the muscle Lot # /[id number]  Exp: 8/2019   Refills:  0        * BOTOX IJ   Dose:  162.5 Units        Inject 162.5 Units as directed once Lot #: /C3 Exp: 10/2019   Refills:  0        carbamide peroxide 6.5 % otic solution   Commonly known as:  DEBROX   Dose:  5 drop        5 drops 2 times daily   Refills:  0        clobetasol 0.05 % cream   Commonly known as:  TEMOVATE   Quantity:  60 g        Apply topically 2 times daily   Refills:  0        Colchicine 0.6 MG Caps    Dose:  1 capsule   Quantity:  90 capsule        Take 0.6 mg by mouth See Admin Instructions 2 capsules in AM and 1 capsule in PM   Refills:  3        dexamethasone 4 MG/ML injection   Commonly known as:  DECADRON   Dose:  4 mg   Quantity:  30 mL        Apply 1 mL (4 mg) topically as needed   Refills:  0        diclofenac 1 % Gel topical gel   Commonly known as:  VOLTAREN   Quantity:  100 g        Apply 2-4 grams to affected area(s) up to 4 times per day as needed. This is an anti-inflammatory medication.   Refills:  4        dicyclomine 20 MG tablet   Commonly known as:  BENTYL   Dose:  20 mg   Quantity:  60 tablet        Take 1 tablet (20 mg) by mouth 4 times daily as needed   Refills:  1        doxycycline 100 MG capsule   Commonly known as:  VIBRAMYCIN   Dose:  100 mg   Quantity:  20 capsule        Take 1 capsule (100 mg) by mouth 2 times daily   Refills:  0        EPINEPHrine 0.3 MG/0.3ML injection   Commonly known as:  EPIPEN 2-EAMON   Dose:  0.3 mg   Quantity:  0.6 mL        Inject 0.3 mLs (0.3 mg) into the muscle as needed for anaphylaxis   Refills:  3        EXCEDRIN MIGRAINE PO        Take by mouth as needed   Refills:  0        guanFACINE 1 MG tablet   Commonly known as:  TENEX   Quantity:  180 tablet        Take 3 tablets (3 mg) by mouth twice daily in the morning and evening daily.   Refills:  3        hyoscyamine 0.125 MG tablet   Commonly known as:  ANASPAZ/LEVSIN   Dose:  0.125-0.25 mg   Quantity:  40 tablet        Take 1-2 tablets (125-250 mcg) by mouth every 4 hours as needed for cramping   Refills:  1        hypromellose 0.3 % Soln ophthalmic solution   Commonly known as:  GENTEAL   Dose:  1 drop        1 drop every hour as needed   Refills:  0        LACTAID PO        Take by mouth daily   Refills:  0        lidocaine 2 % topical gel   Commonly known as:  XYLOCAINE   Dose:  1 Tube        Apply 1 Tube topically daily Reported on 4/21/2017   Refills:  0        lidocaine visc 2% 2.5mL/5mL &  maalox/mylanta w/ simeth 2.5mL/5mL & diphenhydrAMINE 5mg/5mL Susp suspension   Commonly known as:  MAGIC Mouthwash [facility]OSPITAL   Dose:  10 mL   Quantity:  1 Bottle        Swish and swallow 10 mLs in mouth every 6 hours as needed for mouth sores   Refills:  1        linaclotide 290 MCG capsule   Commonly known as:  LINZESS   Dose:  290 mcg   Quantity:  30 capsule        Take 1 capsule (290 mcg) by mouth every morning (before breakfast)   Refills:  1        LORazepam 1 MG tablet   Commonly known as:  ATIVAN   Dose:  0.5 mg   Quantity:  90 tablet        Take 0.5 tablets (0.5 mg) by mouth every 6 hours as needed for other (abdominal pain) Do not operate a vehicle after taking this medication   Refills:  1        meclizine 25 MG tablet   Commonly known as:  ANTIVERT   Dose:  25 mg   Quantity:  30 tablet        Take 1 tablet (25 mg) by mouth every 6 hours as needed for dizziness   Refills:  1        metaxalone 800 MG tablet   Commonly known as:  SKELAXIN   Dose:  400 mg   Quantity:  30 tablet        Take 0.5 tablets (400 mg) by mouth 3 times daily as needed for moderate pain   Refills:  1        norgestimate-ethinyl estradiol 0.25-35 MG-MCG per tablet   Commonly known as:  ORTHO-CYCLEN, SPRINTEC   Dose:  1 tablet   Quantity:  84 tablet        Take 1 tablet by mouth daily   Refills:  3        omeprazole 40 MG capsule   Commonly known as:  priLOSEC   Dose:  40 mg   Quantity:  90 capsule        Take 1 capsule (40 mg) by mouth daily   Refills:  3        * ondansetron 8 MG ODT tab   Commonly known as:  ZOFRAN-ODT   Dose:  8 mg   Quantity:  60 tablet        Take 1 tablet (8 mg) by mouth every 8 hours as needed for nausea   Refills:  11        * ondansetron 4 MG ODT tab   Commonly known as:  ZOFRAN-ODT   Dose:  4 mg   Quantity:  60 tablet        Take 1 tablet (4 mg) by mouth every 6 hours as needed for nausea   Refills:  3        promethazine 25 MG tablet   Commonly known as:  PHENERGAN   Dose:  12.5-25 mg   Quantity:  20 tablet         Take 0.5-1 tablets (12.5-25 mg) by mouth every 6 hours as needed for nausea   Refills:  1        RANITIDINE 75 PO        Take  by mouth. As needed for GI upset   Refills:  0        sucralfate 1 GM/10ML suspension   Commonly known as:  CARAFATE   Dose:  1 g   Quantity:  1200 mL        Take 10 mLs (1 g) by mouth 4 times daily   Refills:  2        SUMAtriptan 100 MG tablet   Commonly known as:  IMITREX   Dose:  100 mg   Quantity:  18 tablet        Take 1 tablet (100 mg) by mouth at onset of headache for migraine May repeat in 2 hours. Max 2 tablets/24 hours.   Refills:  3        triamcinolone 0.1 % cream   Commonly known as:  KENALOG   Quantity:  15 g        Apply sparingly to oral ulcers three times daily for 14 days as needed.   Refills:  1        * Notice:  This list has 6 medication(s) that are the same as other medications prescribed for you. Read the directions carefully, and ask your doctor or other care provider to review them with you.            Prescriptions were sent or printed at these locations (1 Prescription)                   Other Prescriptions                Printed at Department/Unit printer (1 of 1)         lactulose 20 GM/30ML SOLN                Procedures and tests performed during your visit     Procedure/Test Number of Times Performed    Abdomen XR, 2 vw, flat and upright 1    CBC with platelets differential 1    Comprehensive metabolic panel 1    Peripheral IV catheter 1    UA reflex to Microscopic and Culture 1    Vascular Access Care Adult IP Consult 2    hCG qual urine POCT 1      Orders Needing Specimen Collection     None      Pending Results     No orders found from 6/4/2017 to 6/7/2017.            Pending Culture Results     No orders found from 6/4/2017 to 6/7/2017.            Pending Results Instructions     If you had any lab results that were not finalized at the time of your Discharge, you can call the ED Lab Result RN at [phone]. You will be contacted by this team for  "any positive Lab results or changes in treatment. The nurses are available 7 days a week from 10A to 6:30P.  You can leave a message 24 hours per day and they will return your call.        Thank you for choosing [facility]       Thank you for choosing [facility] for your care. Our goal is always to provide you with excellent care. Hearing back from our patients is one way we can continue to improve our services. Please take a few minutes to complete the written survey that you may receive in the mail after you visit with us. Thank you!        [employer]â„[employer] Information     [employer] lets you send messages to your doctor, view your test results, renew your prescriptions, schedule appointments and more. To sign up, go to [email or web address] . Click on \"Log in\" on the left side of the screen, which will take you to the Welcome page. Then click on \"Sign up Now\" on the right side of the page.     You will be asked to enter the access code listed below, as well as some personal information. Please follow the directions to create your username and password.     Your access code is: [email or web address]  Expires: 2017  1:39 PM     Your access code will  in 90 days. If you need help or a new code, please call your [facility] clinic or [phone].        Care EveryWhere ID     This is your Care EveryWhere ID. This could be used by other organizations to access your [facility] medical records  [id number]        After Visit Summary       This is your record. Keep this with you and show to your community pharmacist(s) and doctor(s) at your next visit.                  "

## 2017-06-06 NOTE — TELEPHONE ENCOUNTER
Noted. Would like to appeal please.    Thank you,  Natalie Cha MD  [facility]

## 2017-06-06 NOTE — PROGRESS NOTES
"                                             Progress Note    Client Name: Samara Oropeza  Date: 6/6/2017              Service Type: Individual      Session Start Time: 10:35  Session End Time: 11:30      Session Length: 45     Session #: 11     Attendees: Client attended alone    Treatment Plan Last Reviewed: 3/14/2017   PHQ-9 / TAHIR-7 : 6/6/2017       DATA      Progress Since Last Session (Related to Symptoms / Goals / Homework):   Symptoms: Stable    Homework: Partially completed- has been working on all of the tasks- continues to struggle with them helping with her symptoms      Episode of Care Goals: Satisfactory progress - ACTION (Actively working towards change); Intervened by reinforcing change plan / affirming steps taken     Current / Ongoing Stressors and Concerns:   Client reports that she had one ED visit, possibly for seizure.  Client toured a new place for apartments to move into when their lease is up in September.  Physical symptoms continue to interfere with  her ability to eating. Client continues to work- about 14 hours a week- it continues to be a challenge and client is exhausted and unable to function.   Some tension continue between client and her bf.     Treatment Objective(s) Addressed in This Session:   use cognitive strategies identified in therapy to challenge anxious thoughts  Increase restful sleep     Intervention:   CBT: refocus on self-care        ASSESSMENT: Current Emotional / Mental Status (status of significant symptoms):   Risk status (Self / Other harm or suicidal ideation)   Client denies current fears or concerns for personal safety.   Client denies current or recent suicidal ideation or behaviors. Had one day when passive SI thought- took extra medications to help with \"sleeping\"-Ativan, Amitriptlye, and older pills.  She did let her bf               know what she did.  Recognizes her stressors related to why she wanted to \"sleep\".    Client denies current or recent " homicidal ideation or behaviors.   Client denies current or recent self injurious behavior or ideation.   Client denies other safety concerns.   A safety and risk management plan has not been developed at this time, however client was given the after-hours number / 911 should there be a change in any of these risk factors.     Appearance:   Appropriate    Eye Contact:   Fair    Psychomotor Behavior: Normal    Attitude:   Cooperative    Orientation:   All   Speech    Rate / Production: Monotone     Volume:  Soft    Mood:    Anxious    Affect:    Flat    Thought Content:  Clear    Thought Form:  Coherent  Logical    Insight:    Fair      Medication Review:   No current psychiatric medications prescribed     Medication Compliance:   NA     Changes in Health Issues:   Yes: Pain, Associated Psychological Distress     Chemical Use Review:   Substance Use: Chemical use reviewed, no active concerns identified      Tobacco Use: No current tobacco use.       Collateral Reports Completed:   Not Applicable    PLAN: (Client Tasks / Therapist Tasks / Other)  Homework:      1. Call Social Security and verify application timeline.  2.  Keep focusing on self-care.      Layla Browne, LICSW                                                         ________________________________________________________________________    Treatment Plan    Client's Name: Samara Oropeza  YOB: 1995    Date: 3/14/2017     DSM-V Diagnoses: 296.32 Major Depressive Disorder, Recurrent Episode, Moderate _ or 300.02 (F41.1) Generalized Anxiety Disorder  Psychosocial / Contextual Factors: Moderate- Minimal contact with family, Health issues  WHODAS: 30    Referral / Collaboration:  Referral to another professional/service is not indicated at this time..    Anticipated number of session or this episode of care: 12      MeasurableTreatment Goal(s) related to diagnosis / functional impairment(s)  Goal 1: Client will decrease  anxiety symptoms as eveidenced by self-report and TAHIR-7 scores, currently at 14, goal to 7 or below    I will know I've met my goal when I am better able to deal with it.      Objective #A (Client Action)    Client will use relaxation strategies 3x times per day to reduce the physical symptoms of anxiety.  Status: New - Date: 3/14/17     Intervention(s)  Therapist will teach different relaxation strategies and have client complete one between session.    Objective #B  Client will use cognitive strategies identified in therapy to challenge anxious thoughts.  Status: New - Date: 3/14/17     Intervention(s)  Therapist will 1.  introduce cognitive distortions; 2. build awareness for client; 3. leanr an implement diffferent cognitive restructuring techniques.    Objective #C  Client will increase her amount of restful sleep.  Status: New - Date: 3/14/17     Intervention(s)  Therapist will 1. start a sleep journal; 2. Introduce techniques for falling and staying asleep.        Client has reviewed and agreed to the above plan.      Layla Browne, [facility]  March 14, 2017

## 2017-06-07 ASSESSMENT — ENCOUNTER SYMPTOMS
CONSTIPATION: 1
ABDOMINAL PAIN: 1
SHORTNESS OF BREATH: 0
FEVER: 0

## 2017-06-07 ASSESSMENT — ANXIETY QUESTIONNAIRES: GAD7 TOTAL SCORE: 15

## 2017-06-07 ASSESSMENT — PATIENT HEALTH QUESTIONNAIRE - PHQ9: SUM OF ALL RESPONSES TO PHQ QUESTIONS 1-9: 19

## 2017-06-07 NOTE — ED PROVIDER NOTES
"  History     Chief Complaint   Patient presents with     Abdominal Pain     HPI  Samara Oropeza is a 21 year old female with a history of Behcet's disease, gastroparesis, chronic abdominal pain, chronic constipation, GERD, and fibromyalgia who presents for evaluation of abdominal pain. The patient complains of constant right lower quadrant abdominal pain that has been persistent throughout the day today. She also complains of abdominal distention and constipation. Her last bowel movement was about 1.5 weeks ago, though she notes that this is not atypical for her. She is on Linzess for the constipation, though it has not been helping over the last week. She describes the current abdominal pain as a sharp, pressure with a \"cold prickly\" sensation. The patient notes that this pain is different than her chronic abdominal pain as it is lower than usual. She denies any nausea, vomiting, diarrhea, dysuria or hematuria. She also denies fever, cough or shortness of breath. She reports a recent pneumonia. She also reports that she has had a history of ovarian cysts and is currently on her menstrual period.     I have reviewed the Medications, Allergies, Past Medical and Surgical History, and Social History in the [employer] system.  Past Medical History:   Diagnosis Date     Anxiety      Arthritis      Behcet's disease (H)      Cervical adenitis May 2010     Chronic abdominal pain      Constipation, chronic 1994     Gastro-oesophageal reflux disease      Gastroparesis      Migraines      Neuromuscular disorder (H)     fibramyalgia     Palpitations      Seizures (H)     unknown etiology     Syncope      Tourette's        Past Surgical History:   Procedure Laterality Date     ARTHROSCOPY KNEE WITH PATELLAR REALIGNMENT  7/25/2013    Procedure: ARTHROSCOPY KNEE WITH PATELLAR REALIGNMENT;  Left Knee Arthroscopy, Medial Patellofemoral Ligament Reconstruction with Allograft  ;  Surgeon: Jennifer Acevedo MD;  Location: [facility]     " DENTAL SURGERY  1996    Teeth removal     ENDOSCOPY UPPER, COLONOSCOPY, COMBINED  2005      ESOPH/GAS REFLUX TEST W NASAL IMPED >1 HR N/A 2/15/2017    Procedure: ESOPHAGEAL IMPEDENCE FUNCTION TEST WITH 24 HOUR PH GREATER THAN 1 HOUR;  Surgeon: Timothy Matta MD;  Location:  GI       Family History   Problem Relation Age of Onset     Depression Mother      Neurologic Disorder Mother      Migraines, take imitrex injection.  Also in maternal grandmother.       Alcohol/Drug Father      Hypertension Father      Depression Father      Cardiovascular Maternal Grandmother      Depression Maternal Grandmother      Hypertension Maternal Grandmother      Alzheimer Disease Maternal Grandmother      Cardiovascular Maternal Grandfather      Hypertension Maternal Grandfather      Depression Maternal Grandfather      Alcohol/Drug Maternal Grandfather      Cardiovascular Paternal Grandmother      Hypertension Paternal Grandmother      Cardiovascular Paternal Grandfather      Hypertension Paternal Grandfather      Glaucoma No family hx of      Macular Degeneration No family hx of        Social History   Substance Use Topics     Smoking status: Never Smoker     Smokeless tobacco: Never Used     Alcohol use Yes      Comment: seldom     Current Facility-Administered Medications   Medication     lidocaine (PF) (XYLOCAINE) 1 % injection     Current Outpatient Prescriptions   Medication     lactulose 20 GM/30ML SOLN     guanFACINE (TENEX) 1 MG tablet     ondansetron (ZOFRAN-ODT) 4 MG ODT tab     sucralfate (CARAFATE) 1 GM/10ML suspension     diclofenac (VOLTAREN) 1 % GEL topical gel     benzonatate (TESSALON) 100 MG capsule     doxycycline (VIBRAMYCIN) 100 MG capsule     metaxalone (SKELAXIN) 800 MG tablet     LORazepam (ATIVAN) 1 MG tablet     ondansetron (ZOFRAN-ODT) 8 MG ODT tab     aspirin (ASPIRIN CHILDRENS) 81 MG chewable tablet     dicyclomine (BENTYL) 20 MG tablet     amitriptyline (ELAVIL) 25 MG tablet     omeprazole  (PRILOSEC) 40 MG capsule     EPINEPHrine (EPIPEN 2-EAMON) 0.3 MG/0.3ML injection     apremilast (OTEZLA) 30 MG tablet     Colchicine 0.6 MG CAPS     dexamethasone (DECADRON) 4 MG/ML injection     OnabotulinumtoxinA (BOTOX IJ)     norgestimate-ethinyl estradiol (ORTHO-CYCLEN, SPRINTEC) 0.25-35 MG-MCG per tablet     albuterol (PROAIR HFA/PROVENTIL HFA/VENTOLIN HFA) 108 (90 BASE) MCG/ACT Inhaler     OnabotulinumtoxinA (BOTOX IJ)     SUMAtriptan (IMITREX) 100 MG tablet     promethazine (PHENERGAN) 25 MG tablet     meclizine (ANTIVERT) 25 MG tablet     Magic Mouthwash (FV std formula) lidocaine visc 2% 2.5mL/5mL & maalox/mylanta w/ simeth 2.5mL/5mL & diphenhydrAMINE 5mg/5mL     clobetasol (TEMOVATE) 0.05 % cream     OnabotulinumtoxinA (BOTOX IJ)     botulinum toxin type A (BOTOX) 100 UNITS injection     linaclotide (LINZESS) 290 MCG capsule     hyoscyamine (ANASPAZ,LEVSIN) 0.125 MG tablet     Aspirin-Acetaminophen-Caffeine (EXCEDRIN MIGRAINE PO)     hypromellose (GENTEAL) 0.3 % SOLN     betamethasone valerate (VALISONE) 0.1 % cream     carbamide peroxide (DEBROX) 6.5 % otic solution     Lactase (LACTAID PO)     lidocaine (XYLOCAINE) 2 % jelly     triamcinolone (KENALOG) 0.1 % cream     Ranitidine HCl (RANITIDINE 75 PO)        Allergies   Allergen Reactions     Amoxil [Penicillins] Rash     Dad unsure of reaction.     Contrast Dye Rash     Contrast Media Ready-Box [facility] [place] [facility], 04/09/2014.; Contrast Media Ready-Box [facility], 04/09/2014.  NOTE: this is a contrast media oral with iodine. Premedicate with methylpred standard for IV contrast, request barium contrast for oral contrast.     Kiwi Swelling     Orange Fruit [Citrus] Anaphylaxis     Pineapple Anaphylaxis, Difficulty breathing and Rash     Milk Protein Extract Hives     Reglan [Metoclopramide] Other (See Comments)     IV dose only, in ER, rapid heart rate.     Ace Inhibitors      Difficulty in breathing and GI upset     Amitiza [Lubiprostone] Nausea and Vomiting      "Amoxicillin-Pot Clavulanate      Latex      Midazolam Unknown     parent states that when pt takes this medication, she wakes up being very violent .     Versed      Coming out of pelvic exam at age of 5, was kicking and screaming when coming out of the versed.     Adhesive Tape Rash     Azithromycin Hives and Rash     Cephalexin Itching and Rash     Itchy mouth     Keflex [Cephalexin-Fd&C Yellow #6] Hives       Review of Systems   Constitutional: Negative for fever.   Respiratory: Negative for shortness of breath.    Cardiovascular: Negative for chest pain.   Gastrointestinal: Positive for abdominal pain and constipation.   All other systems reviewed and are negative.      Physical Exam   BP: 115/80  Pulse: 91  Temp: 98.4  F (36.9  C)  Resp: 18  Height: 160 cm (5' 3\")  Weight: 66.7 kg (147 lb)  SpO2: 99 %  Physical Exam   Constitutional: No distress.   HENT:   Head: Atraumatic.   Mouth/Throat: Oropharynx is clear and moist. No oropharyngeal exudate.   Eyes: Pupils are equal, round, and reactive to light. No scleral icterus.   Cardiovascular: Normal heart sounds and intact distal pulses.    Pulmonary/Chest: Breath sounds normal. No respiratory distress.   Abdominal: Soft. Bowel sounds are normal. There is tenderness.       Musculoskeletal: She exhibits no edema or tenderness.   Skin: Skin is warm. No rash noted. She is not diaphoretic.       ED Course     ED Course     Procedures        8:23 PM  The patient was seen and examined by Dr. Taylor in Room 23.          Critical Care time:  none               Labs Ordered and Resulted from Time of ED Arrival Up to the Time of Departure from the ED   UA MACROSCOPIC WITH REFLEX TO MICRO AND CULTURE - Abnormal; Notable for the following:        Result Value    pH Urine 7.5 (*)     Mucous Urine Present (*)     All other components within normal limits   COMPREHENSIVE METABOLIC PANEL - Abnormal; Notable for the following:     Urea Nitrogen 5 (*)     Protein Total 6.7 (*)     " All other components within normal limits   HCG QUAL URINE POCT - Normal   CBC WITH PLATELETS DIFFERENTIAL   PERIPHERAL IV CATHETER            Assessments & Plan (with Medical Decision Making)   The patient has a nonsurgical abdomen. She does have some diffuse tenderness, worse in the right lower quadrant and the right mid quadrant. She had wincing, but no true guarding. UPT was checked and negative, urinalysis negative for sign of infection, CMP unremarkable, CBC is normal with a white count of 4.7. I did do plain abdominal x-ray which shows a very large stool burden, with a pocket of gas in the right lower quadrant. I do think this is most likely the cause for her pain, particularly given the clinical situation. I did strongly recommend an enema here, partially for therapeutics and partially to make sure this was relieving her symptoms. Patient declines an enema at this time. She states she has meds at home that she can try to use. She is declining mag citrate, and I will give her a prescription for lactulose, which I strongly encourage her to use. I did discuss that I think serious cause of her symptoms is unlikely, however, it is certainly still within the realm of possibility. I do strongly encourage her to return to the ER with any worsening or concerns, and follow up with primary care within the next couple of days. She verbalized understanding and is agreeable with plan.    I have reviewed the nursing notes.    I have reviewed the findings, diagnosis, plan and need for follow up with the patient.    Discharge Medication List as of 6/6/2017 10:12 PM      START taking these medications    Details   lactulose 20 GM/30ML SOLN Take 30 mLs by mouth 3 times daily as needed, Disp-300 mL, R-0, Local Print             Final diagnoses:   Abdominal pain, generalized   Constipation, unspecified constipation type   I, Garrick Miller, am serving as a trained medical scribe to document services personally performed by  Felicia Taylor MD, based on the provider's statements to me.   I, Felicia Taylor MD, was physically present and have reviewed and verified the accuracy of this note documented by Garrick Miller.     6/6/2017   [facility], [place], EMERGENCY DEPARTMENT     Felicia Taylor MD  06/07/17 0009

## 2017-06-07 NOTE — TELEPHONE ENCOUNTER
I have put to gather the appeal letter and put it on providers desk to review and sign off on. Will fax it to the insurance company as soon as I receive it back.      Mary Elliott [facility] [place]

## 2017-06-07 NOTE — DISCHARGE INSTRUCTIONS
Abdominal Pain  Abdominal pain is pain in the stomach or intestinal area. Everyone has this pain from time to time. In many cases it goes away on its own. But abdominal pain can sometimes be due to a serious problem, such as appendicitis. So it s important to know when to seek help.  Causes of abdominal pain  There are many possible causes of abdominal pain. Common causes in adults include:    Constipation, diarrhea, or gas    GERD (gastroesophageal reflux disease) movement of stomach acid into the esophagus, also known as acid reflux or heartburn    Peptic ulcer (a sore in the lining of the stomach or small intestine)    Inflammation of the gallbladder, liver, or pancreas    Gallstones or kidney stones    Appendicitis     Obstruction of the intestines     Hernia (bulging of an internal organ through a muscle or other tissue)    Urinary tract infections    In women, menstrual cramps, fibroids, or endometriosis of the uterus    Inflammation or infection of the intestines  Diagnosing the cause of abdominal pain  Your health care provider will examine you to help find the cause of your pain. If needed, tests will be ordered. Because abdominal pain has so many possible causes, it can be hard to discover the reason for the pain. Giving details about your pain can help. Be ready to tell your health care provider where and when you feel the pain and what makes it better or worse. Also mention whether you have other symptoms such as fever, tiredness, nausea, vomiting, or changes in bathroom habits.  Treating abdominal pain  Certain causes of pain, such as appendicitis or a bowel obstruction, need emergency treatment. Other problems can be treated with rest, fluids, or medications. Your health care provider can give you specific instructions for treatment or self-care based on the cause of your pain.  If you have vomiting or diarrhea, sip water or other clear fluids. When you are ready to eat solid foods again, start with  small amounts of easy-to-digest, low-fat foods, such as applesauce, toast, or crackers.   When to call the doctor  Call 911 or go to the hospital right away if you:    Can t pass stool and are vomiting    Are vomiting blood or have black, tarry diarrhea    Also have chest, neck, or shoulder pain    Feel like you are about to pass out    Have pain in your shoulder blades with nausea    Have sudden, excruciating abdominal pain    Have new, severe pain unlike any you have felt before    Have a belly that is rigid, hard, and tender to touch  Call your doctor if you have:    Pain for more than 5 days    Bloating for more than 2 days    Diarrhea for more than 5 days    Fever of 101 F (38.3 C) or higher    Pain that continues to worsen    Unexplained weight loss    Continued lack of appetite    Blood in the stool  How to prevent abdominal pain  Here are some tips to help prevent abdominal pain:    Eat smaller amounts of food at one time.    Avoid greasy, fried, or other high-fat foods.    Avoid foods that give you gas.    Exercise regularly.    Drink plenty of fluids.  To help prevent symptoms of gastroesophageal reflux disease (GERD):    Quit smoking.    Reduce alcohol and certain foods that increase stomach acid.     Lose excess weight.    Finish eating at least 2 hours before you go to bed or lie down.    Elevate the head of your bed.    7667-3197 [employer] | [employer]. [address] [place] [address], [place] [id number]. All rights reserved. This information is not intended as a substitute for professional medical care. Always follow your healthcare professional's instructions.    Continue with your normal stool meds. Add Lactulose, as discussed. I recommend you try an enema or suppository at home. Return to the ER with any worsening or concerns. Follow up with your clinic doctor in 2-3 days.

## 2017-06-13 ENCOUNTER — OFFICE VISIT (OUTPATIENT)
Dept: BEHAVIORAL HEALTH | Facility: CLINIC | Age: 22
End: 2017-06-13
Payer: COMMERCIAL

## 2017-06-13 DIAGNOSIS — F43.10 PTSD (POST-TRAUMATIC STRESS DISORDER): ICD-10-CM

## 2017-06-13 DIAGNOSIS — M35.2 BEHCET'S DISEASE (H): ICD-10-CM

## 2017-06-13 DIAGNOSIS — F41.1 GAD (GENERALIZED ANXIETY DISORDER): ICD-10-CM

## 2017-06-13 DIAGNOSIS — F33.1 MODERATE EPISODE OF RECURRENT MAJOR DEPRESSIVE DISORDER (H): Primary | ICD-10-CM

## 2017-06-13 PROCEDURE — 90834 PSYTX W PT 45 MINUTES: CPT | Performed by: SOCIAL WORKER

## 2017-06-13 NOTE — TELEPHONE ENCOUNTER
I have faxed the appeal letter to [employer]. I received conformation that the fax went through. I am awaiting a reply from the insurance company.      Mary Elliott [facility] [place]

## 2017-06-13 NOTE — PROGRESS NOTES
"                                             Progress Note    Client Name: Samara Oropeza  Date: 6/13/2017              Service Type: Individual      Session Start Time: 10:35  Session End Time: 11:30      Session Length: 45     Session #: 11     Attendees: Client attended alone    Treatment Plan Last Reviewed: 3/14/2017   PHQ-9 / TAHIR-7 : 6/6/2017       DATA      Progress Since Last Session (Related to Symptoms / Goals / Homework):   Symptoms: Stable    Homework: Partially completed- did not call SS, but has been working on eating self-care      Episode of Care Goals: Satisfactory progress - ACTION (Actively working towards change); Intervened by reinforcing change plan / affirming steps taken     Current / Ongoing Stressors and Concerns:  Client reports that she had three ED visits this past week. Had one \"seizure\" episode. 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  Has a busy weekend planned - is going to seeing her father and attend graduation parties.  Talked about incorporating more mindfulness in between social interactions.      Treatment Objective(s) Addressed in This Session:   use cognitive strategies identified in therapy to challenge anxious thoughts  Increase restful sleep     Intervention:   CBT: refocus on self-care        ASSESSMENT: Current Emotional / Mental Status (status of significant symptoms):   Risk status (Self / Other harm or suicidal ideation)   Client denies current fears or concerns for personal safety.   Client denies current or recent suicidal ideation or behaviors. Had one day when passive SI thought- talked with her bf    Client denies current or recent homicidal ideation or behaviors.   Client denies current or recent self injurious behavior or ideation.   Client denies other safety concerns.   A safety and risk management plan has not been developed at this time, however client was given the " after-hours number / 911 should there be a change in any of these risk factors.     Appearance:   Appropriate    Eye Contact:   Fair    Psychomotor Behavior: Normal    Attitude:   Cooperative    Orientation:   All   Speech    Rate / Production: Monotone     Volume:  Soft    Mood:    Anxious    Affect:    Flat    Thought Content:  Clear    Thought Form:  Coherent  Logical    Insight:    Fair      Medication Review:   No current psychiatric medications prescribed     Medication Compliance:   NA     Changes in Health Issues:   Yes: Pain, Associated Psychological Distress     Chemical Use Review:   Substance Use: Chemical use reviewed, no active concerns identified      Tobacco Use: No current tobacco use.       Collateral Reports Completed:   Not Applicable    PLAN: (Client Tasks / Therapist Tasks / Other)  Homework:      1. Call [employer] and verify application timeline.  2.  Trying to incorporate more mindfulness before social interactions.      Layla Browne, [facility]                                                         ________________________________________________________________________    Treatment Plan    Client's Name: Samara Oropeza  YOB: 1995    Date: 3/14/2017     DSM-V Diagnoses: 296.32 Major Depressive Disorder, Recurrent Episode, Moderate _ or 300.02 (F41.1) Generalized Anxiety Disorder  Psychosocial / Contextual Factors: Moderate- Minimal contact with family, Health issues  WHODAS: 30    Referral / Collaboration:  Referral to another professional/service is not indicated at this time..    Anticipated number of session or this episode of care: 12      MeasurableTreatment Goal(s) related to diagnosis / functional impairment(s)  Goal 1: Client will decrease anxiety symptoms as eveidenced by self-report and TAHIR-7 scores, currently at 14, goal to 7 or below    I will know I've met my goal when I am better able to deal with it.      Objective #A (Client  Action)    Client will use relaxation strategies 3x times per day to reduce the physical symptoms of anxiety.  Status: New - Date: 3/14/17     Intervention(s)  Therapist will teach different relaxation strategies and have client complete one between session.    Objective #B  Client will use cognitive strategies identified in therapy to challenge anxious thoughts.  Status: New - Date: 3/14/17     Intervention(s)  Therapist will 1.  introduce cognitive distortions; 2. build awareness for client; 3. leanr an implement diffferent cognitive restructuring techniques.    Objective #C  Client will increase her amount of restful sleep.  Status: New - Date: 3/14/17     Intervention(s)  Therapist will 1. start a sleep journal; 2. Introduce techniques for falling and staying asleep.        Client has reviewed and agreed to the above plan.      Layla Browne, [facility]  March 14, 2017

## 2017-06-13 NOTE — TELEPHONE ENCOUNTER
March from [facility] calling with questions regarding the appeal letter. Her call back number is [phone]

## 2017-06-13 NOTE — Clinical Note
Phu Ivy,  This young lady is heading your way.  What i see is a lot of anxiety, PTSD, and depressive symptoms.  She also has the diagnosis of Behcet's disease that complicates her presentation.  She sees you on 6/26/17/ Let me know if you have any questions.  Thanks, Layla

## 2017-06-13 NOTE — MR AVS SNAPSHOT
"                  MRN:[id number]                      After Visit Summary   6/13/2017    Samara Oropeza    MRN: [id number]           Visit Information        Provider Department      6/13/2017 10:30 AM Layla Browne LICSW Located within [facility] Generic      Your next 10 appointments already scheduled     Jun 19, 2017  8:00 AM CDT   New Visit with Kimo Hudson LP   [facility] ([facility])    [address]  [facility] [address]  [facility] [address] [phone]            Jun 22, 2017  1:50 PM CDT   (Arrive by 1:35 PM)   Return Botox with Frederick Bender MD   [facility] ([facility])    [address]  [address] [facility] [id number] [phone]            Jun 27, 2017 10:45 AM CDT   New Visit with Cata Hurst NP   [facility] ([facility]    [address]  [address]  [facility] [id number] [phone]            Jun 27, 2017  2:30 PM CDT   Return Visit with ALISA Burt   [facility] ([facility])    [address] [id number] [phone]            Jul 11, 2017 10:30 AM CDT   Return Visit with ALISA Burt   [facility] ([facility])    [address] [id number] [phone]            Aug 22, 2017 11:00 AM CDT   [facility]    [address] [id number] [phone]              MyChart Information     [employer] lets you send messages to your doctor, view your test results, renew your prescriptions, schedule appointments and more. To sign up, go to [email or web address] . Click on \"Log " "in\" on the left side of the screen, which will take you to the Welcome page. Then click on \"Sign up Now\" on the right side of the page.     You will be asked to enter the access code listed below, as well as some personal information. Please follow the directions to create your username and password.     Your access code is: [email or web address]  Expires: 2017  1:39 PM     Your access code will  in 90 days. If you need help or a new code, please call your [place] clinic or [phone].        Care EveryWhere ID     This is your Care EveryWhere ID. This could be used by other organizations to access your [place] medical records  [id number]        "

## 2017-06-14 NOTE — TELEPHONE ENCOUNTER
Voicemail left for Mechelle, number below, regarding appeal letter on this patient. Gave her triage number for message and also gave [phone] for  to try and reach out live.    Kat Sneed, MSN, RN-BC  Care Coordinator  [facility] [place]

## 2017-06-15 NOTE — TELEPHONE ENCOUNTER
PA approved.  Effective date: 06/15/2017-06/15/2020  PA reference #: [id number]  Pt. notified:   Letter sent by insurance company and letter forwarded to [employer] 06/15/2017.      Mary Elliott [facility] [place]

## 2017-06-15 NOTE — TELEPHONE ENCOUNTER
Ia, from [facility] pharmacy is calling in regards to the medication below.   States she is following up on Voltaren and Zofran medications.   Please follow up. Ph. [phone], Option 2.     Chikis ARRIAGA    [facility] [place]

## 2017-06-21 ENCOUNTER — TELEPHONE (OUTPATIENT)
Dept: FAMILY MEDICINE | Facility: CLINIC | Age: 22
End: 2017-06-21

## 2017-06-21 DIAGNOSIS — G43.909 MIGRAINE WITHOUT STATUS MIGRAINOSUS, NOT INTRACTABLE, UNSPECIFIED MIGRAINE TYPE: ICD-10-CM

## 2017-06-21 NOTE — TELEPHONE ENCOUNTER
amitriptyline (ELAVIL) 25 mg oral tablet        Last Written Prescription Date: 4/21/17  Last Quantity: 45, # refills: 5  Last Office Visit with [facility], [facility] or [facility] prescribing provider: 5/17/17  Next 5 appointments (look out 90 days)     Jun 23, 2017  2:45 PM CDT   Office Visit with EVI Skinner CNP   [facility] ([facility]    [address] [place] [id number] [phone]            Jun 27, 2017  2:30 PM CDT   Return Visit with ALISA Burt   [facility] ([facility])    [address] [id number] [phone]            Jul 11, 2017 10:30 AM CDT   Return Visit with ALISA Burt   [facility] ([facility])    [address] [id number] [phone]            Aug 22, 2017 11:00 AM CDT   Return Visit with Austen Marquez MD   [facility]    [address] [id number] [phone]                   Creatinine   Date Value Ref Range Status   06/06/2017 0.77 0.52 - 1.04 mg/dL Final     Lab Results   Component Value Date    AST 26 06/06/2017     Lab Results   Component Value Date    ALT 44 06/06/2017     BP Readings from Last 3 Encounters:   06/06/17 111/72   06/02/17 117/85   05/17/17 94/72

## 2017-06-21 NOTE — TELEPHONE ENCOUNTER
Last night she had chest pain she took ativan and 2- 81mg aspirin, stated that didn't help much, she stated she passed out, while sitting on the couch, it was only for a couple of seconds, she then fell asleep and when she woke up this am had breakfast and she couldn't taste anything. She currently doesn't have chest pain. She is scheduled for appt on Friday in clinic, she is also going to appt tomorrow with Napoleon.  She will seek medical care if symptoms return and or worsen    Britni Matamoros RN

## 2017-06-22 ENCOUNTER — OFFICE VISIT (OUTPATIENT)
Dept: PHYSICAL MEDICINE AND REHAB | Facility: CLINIC | Age: 22
End: 2017-06-22

## 2017-06-22 DIAGNOSIS — G24.1 DYSTONIA, TORSION, FRAGMENTS OF: ICD-10-CM

## 2017-06-22 DIAGNOSIS — G24.3 SPASMODIC TORTICOLLIS: Primary | ICD-10-CM

## 2017-06-22 RX ORDER — LACTULOSE 10 G/15ML
20 SOLUTION ORAL
COMMUNITY
Start: 2017-06-14 | End: 2017-06-27

## 2017-06-22 NOTE — MR AVS SNAPSHOT
After Visit Summary   6/22/2017    Samara Oropeza    MRN: [id number]           Patient Information     Date Of Birth          1995        Visit Information        Provider Department      6/22/2017 1:50 PM Frederick Bender MD [facility] Physical Medicine and Rehabilitation         Follow-ups after your visit        Your next 10 appointments already scheduled     Jun 23, 2017  2:45 PM CDT   Office Visit with EVI Skinner CNP   [facility] ([facility])    [address] [place] [address] [address]  [facility] [phone]           Bring a current list of meds and any records pertaining to this visit.  For Physicals, please bring immunization records and any forms needing to be filled out.  Please arrive 10 minutes early to complete paperwork.            Jun 27, 2017 10:45 AM CDT   New Visit with Cata Hurst NP   [facility] ([facility])    [address]  [facility] [id number] [phone]            Jun 27, 2017  2:30 PM CDT   Return Visit with ALISA Burt   [facility] ([facility])    [address] [phone]            Jul 03, 2017 10:00 AM CDT   New Visit with Kimo Hudson LP   [facility] ([facility])    [address] [place] [address] 600  [facility] [phone]            Jul 11, 2017 10:30 AM CDT   Return Visit with ALISA Burt   [facility] ([facility])    [address] [phone]            Aug 22, 2017 11:00 AM CDT   Return Visit with Austen Marquez MD   [facility]    [address] [id number] [phone]             Sep 13, 2017  3:00 PM CDT   (Arrive by 2:45 PM)   Return Botox with Frederick Bender MD   [facility] ([facility])    [address] [place] [address] [phone]              Who to contact     Please call your clinic at [phone] to:    Ask questions about your health    Make or cancel appointments    Discuss your medicines    Learn about your test results    Speak to your doctor   If you have compliments or concerns about an experience at your clinic, or if you wish to file a complaint, please contact [facility] Patient Relations at [phone] or email us at [email or web address]         Additional Information About Your Visit        [employer] Information     [employer] is an electronic gateway that provides easy, online access to your medical records. With [employer], you can request a clinic appointment, read your test results, renew a prescription or communicate with your care team.     To sign up for [employer] visit the website at [email or web address]   You will be asked to enter the access code listed below, as well as some personal information. Please follow the directions to create your username and password.     Your access code is: [email or web address]  Expires: 2017  1:39 PM     Your access code will  in 90 days. If you need help or a new code, please contact your [facility] or call [phone] for assistance.        Care EveryWhere ID     This is your Care EveryWhere ID. This could be used by other organizations to access your [facility] medical records  [id number]        Your Vitals Were     Last Period                   2017            Blood Pressure from Last 3 Encounters:   17 111/72   17 117/85   17 94/72    Weight from Last 3 Encounters:   17 66.7 kg (147 lb)   17 66.5 kg (146 lb 9.6 oz)   17  67.6 kg (149 lb)              Today, you had the following     No orders found for display       Primary Care Provider Office Phone # Fax #    EVI Cardona [facility] [phone]       [facility] [address] [place] [address]  [facility] [address]        Equal Access to Services     NABIL GALEANO : Hadii aad ku hadasho Soomaali, waaxda luqadaha, qaybta kaalmada adeegyada, waxay idiin hayaan adeeg khallenchante rodriguez . So [facility] [phone].    ATENCIÓN: Si habla español, tiene a livingston disposición servicios gratuitos de asistencia lingüística. Llame al [phone].    We comply with applicable federal civil rights laws and [place] laws. We do not discriminate on the basis of race, color, national origin, age, disability sex, sexual orientation or gender identity.            Thank you!     Thank you for choosing [facility] PHYSICAL MEDICINE AND REHABILITATION  for your care. Our goal is always to provide you with excellent care. Hearing back from our patients is one way we can continue to improve our services. Please take a few minutes to complete the written survey that you may receive in the mail after your visit with us. Thank you!             Your Updated Medication List - Protect others around you: Learn how to safely use, store and throw away your medicines at www.disposemymeds.org.          This list is accurate as of: 6/22/17  2:15 PM.  Always use your most recent med list.                   Brand Name Dispense Instructions for use Diagnosis    albuterol 108 (90 BASE) MCG/ACT Inhaler    PROAIR HFA/PROVENTIL HFA/VENTOLIN HFA    1 Inhaler    Inhale 2 puffs into the lungs every 6 hours as needed for shortness of breath / dyspnea or wheezing    Atypical chest pain       amitriptyline 25 MG tablet    ELAVIL    45 tablet    Take 1 tablet (25 mg) by mouth At Bedtime    Atypical chest pain, Insomnia, unspecified type       apremilast 30 MG tablet    OTEZLA    60 tablet    20 mg in AM and 30mg in PM daily  X 2 weeks and then 30mg twice daily.    Behcet's disease (H)       ASPIRIN CHILDRENS 81 MG chewable tablet   Generic drug:  aspirin      Take 81 mg by mouth daily        benzonatate 100 MG capsule    TESSALON    42 capsule    Take 1-2 pills THREE TIMES PER DAY as needed for cough    Acute bronchitis with symptoms > 10 days       betamethasone valerate 0.1 % cream    VALISONE     Apply topically 2 times daily        * botulinum toxin type A 100 UNITS injection    BOTOX    200 Units    Inject 200 Units into the muscle every 3 months    Cervical dystonia       * BOTOX IJ      Inject 150 Units into the muscle once Lot: /C3 Exp: 05/2019        * BOTOX IJ      Inject 150 Units into the muscle Lot # /C3  Exp: 8/2019        * BOTOX IJ      Inject 162.5 Units as directed once Lot #: /C3 Exp: 10/2019        carbamide peroxide 6.5 % otic solution    DEBROX     5 drops 2 times daily        clobetasol 0.05 % cream    TEMOVATE    60 g    Apply topically 2 times daily    Folliculitis       Colchicine 0.6 MG Caps     90 capsule    Take 0.6 mg by mouth See Admin Instructions 2 capsules in AM and 1 capsule in PM    Behcet's syndrome (H)       dexamethasone 4 MG/ML injection    DECADRON    30 mL    Apply 1 mL (4 mg) topically as needed    Sprain of other ligament of left ankle, initial encounter       diclofenac 1 % Gel topical gel    VOLTAREN    100 g    Apply 2-4 grams to affected area(s) up to 4 times per day as needed. This is an anti-inflammatory medication.    Polyarthralgia       dicyclomine 20 MG tablet    BENTYL    60 tablet    Take 1 tablet (20 mg) by mouth 4 times daily as needed    Abdominal cramping       doxycycline 100 MG capsule    VIBRAMYCIN    20 capsule    Take 1 capsule (100 mg) by mouth 2 times daily    Acute bronchitis with symptoms > 10 days       EPINEPHrine 0.3 MG/0.3ML injection    EPIPEN 2-EAMON    0.6 mL    Inject 0.3 mLs (0.3 mg) into the muscle as needed for anaphylaxis    Hx of bee sting  allergy       EXCEDRIN MIGRAINE PO      Take by mouth as needed        guanFACINE 1 MG tablet    TENEX    180 tablet    Take 3 tablets (3 mg) by mouth twice daily in the morning and evening daily.    Tic       hyoscyamine 0.125 MG tablet    ANASPAZ/LEVSIN    40 tablet    Take 1-2 tablets (125-250 mcg) by mouth every 4 hours as needed for cramping    Abdominal pain, generalized       hypromellose 0.3 % Soln ophthalmic solution    GENTEAL     1 drop every hour as needed        LACTAID PO      Take by mouth daily        * lactulose 20 GM/30ML Soln     300 mL    Take 30 mLs by mouth 3 times daily as needed        * lactulose 10 GM/15ML solution    CHRONULAC     Take 20 g by mouth        lidocaine 2 % topical gel    XYLOCAINE     Apply 1 Tube topically daily Reported on 4/21/2017        lidocaine visc 2% 2.5mL/5mL & maalox/mylanta w/ simeth 2.5mL/5mL & diphenhydrAMINE 5mg/5mL Susp suspension    [facility]    1 Bottle    Swish and swallow 10 mLs in mouth every 6 hours as needed for mouth sores    Behcet's syndrome (H)       linaclotide 290 MCG capsule    LINZESS    30 capsule    Take 1 capsule (290 mcg) by mouth every morning (before breakfast)    Irritable bowel syndrome       LORazepam 1 MG tablet    ATIVAN    90 tablet    Take 0.5 tablets (0.5 mg) by mouth every 6 hours as needed for other (abdominal pain) Do not operate a vehicle after taking this medication    Chronic abdominal pain       meclizine 25 MG tablet    ANTIVERT    30 tablet    Take 1 tablet (25 mg) by mouth every 6 hours as needed for dizziness    Nausea       metaxalone 800 MG tablet    SKELAXIN    30 tablet    Take 0.5 tablets (400 mg) by mouth 3 times daily as needed for moderate pain    Sprain of neck, initial encounter, Cervical dystonia       norgestimate-ethinyl estradiol 0.25-35 MG-MCG per tablet    ORTHO-CYCLEN, SPRINTEC    84 tablet    Take 1 tablet by mouth daily    General counseling for prescription of oral contraceptives        omeprazole 40 MG capsule    priLOSEC    90 capsule    Take 1 capsule (40 mg) by mouth daily    Gastroesophageal reflux disease, esophagitis presence not specified       * ondansetron 8 MG ODT tab    ZOFRAN-ODT    60 tablet    Take 1 tablet (8 mg) by mouth every 8 hours as needed for nausea    Non-intractable vomiting with nausea, unspecified vomiting type, Hematemesis, presence of nausea not specified       * ondansetron 4 MG ODT tab    ZOFRAN-ODT    60 tablet    Take 1 tablet (4 mg) by mouth every 6 hours as needed for nausea    Chronic abdominal pain, Gastritis, presence of bleeding unspecified, unspecified chronicity, unspecified gastritis type, Nausea and vomiting, intractability of vomiting not specified, unspecified vomiting type       promethazine 25 MG tablet    PHENERGAN    20 tablet    Take 0.5-1 tablets (12.5-25 mg) by mouth every 6 hours as needed for nausea    Intractable chronic migraine without aura and without status migrainosus       RANITIDINE 75 PO      Take  by mouth. As needed for GI upset        sucralfate 1 GM/10ML suspension    CARAFATE    1200 mL    Take 10 mLs (1 g) by mouth 4 times daily    Bile reflux gastritis, Nausea       SUMAtriptan 100 MG tablet    IMITREX    18 tablet    Take 1 tablet (100 mg) by mouth at onset of headache for migraine May repeat in 2 hours. Max 2 tablets/24 hours.    Intractable chronic migraine without aura and without status migrainosus       triamcinolone 0.1 % cream    KENALOG    15 g    Apply sparingly to oral ulcers three times daily for 14 days as needed.    Behcet's syndrome (H)       * Notice:  This list has 8 medication(s) that are the same as other medications prescribed for you. Read the directions carefully, and ask your doctor or other care provider to review them with you.
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
## 2017-06-22 NOTE — PROGRESS NOTES
"  SUBJECTIVE:                                                    Samara Oropeza is a 21 year old female who presents to clinic today for the following health issues:  She is accompanied by her boyfriend who has an active role in discussing health history and current health concerns.    ED/UC Followup:    Facility:  Last 3 weeks has been to the ER and urgent care 5 times  Date of visit: Does not remember but [facility] [place] [facility] [place]   Reason for visit: Seizure episodes, abdominal pain and cardiac stuff  Current Status: not doing/ feeling good    Neuro - going to new neurologist at [facility] when can coordinate with mom's schedule; states \"they won't call them seizures\"  Pain management - appt cancelled by provider on this Monday, rescheduled for July 3rd  Cardiology - going to new cardiologist, will schedule when can coordinate with mom's schedule  Rheumatology - saw one month ago and will see again in two months  GI - constipation in the ED - declined , restarted regular lactulose on Sunday, causes nausea so does not take three times a day.  Has not noticed effect yet.  Also take Linzess every other day.  Cannot recall last bowel movement, think Monday.  In the past she has had to be hospitalized for clean out.    Today requesting  1) ativan refill - taking once a day for abdominal pain, but states that ER doctors recommended taking 1/2 tab in the AM and 1/2 a tab in the evening plus 1-2 additional tabs everyday to have a \"steady state in the blood.\" Patient's boyfriend states that she \"has a incurable disease\" and this is the only medication that has worked for the abdominal pain.  Patient notes that she has been taking ativan for greater thn two years.  When asked about plan with PCP regarding ativan long-term, boyfriend became agitated and said that PCP \"wants to reduce medication but wants to be realistic also.\" When asked if the ativan and abdominal pain was addressed at pain management, patient said " "\"why would she? [PCP] refills that for me.\"    2) amitriptyline - trouble with insurance and previous prescription not being the right dose       CHEST PAIN     Onset: 1 year ago    Description:   Location:  entire chest  Character: sharp and squeezing, spasm, weak and tingling   Radiation: jaw  Duration: little episodes lasting 20-40 minuets.      Intensity: severe    Progression of Symptoms:  same    Accompanying Signs & Symptoms:  Shortness of breath: YES- sometimes  Sweating: YES- at night  Nausea/vomiting: YES  Lightheadedness: YES  Palpitations: YES  Fever/Chills: YES - \"usually runs 100-101\"   Cough: no   Heartburn: no     History:   Family history of heart disease YES- mother side  Tobacco use: no     Precipitating factors:   Worse with exertion: no   Worse with deep breaths :  YES  Related to food: no, haven't been eating     Alleviating factors:  None       Therapies Tried and outcome: Albuterol inhaler     Says that EKG was abnormal initially in the ER, but that \"there was a lot of noise\" so they repeated it later and it was normal, but patient and her boyfriend attribute this to the patient having ativan and the ativan masking an abnormal on the EKG.     Questions/Concerns: None    Problem list and histories reviewed & adjusted, as indicated.  Additional history: as documented    Reviewed and updated as needed this visit by clinical staff       Reviewed and updated as needed this visit by Provider         ROS:  Constitutional, HEENT, cardiovascular, pulmonary, gi and gu systems are negative, except as otherwise noted.    OBJECTIVE:     /78 (BP Location: Right arm, Patient Position: Chair, Cuff Size: Adult Regular)  Pulse 101  Temp 97.7  F (36.5  C) (Oral)  Wt 146 lb (66.2 kg)  LMP 05/01/2017 (Exact Date)  SpO2 100%  BMI 25.86 kg/m2  Body mass index is 25.86 kg/(m^2).  GENERAL: alert and no distress, sitting hunched over, quiet speech  EYES: Eyes grossly normal to inspection, PERRL and " conjunctivae and sclerae normal  HENT: ear canals and TM's normal, nose and mouth without ulcers or lesions  NECK: no adenopathy, no asymmetry, masses, or scars and thyroid normal to palpation  RESP: lungs clear to auscultation - no rales, rhonchi or wheezes  CV: regular rate and rhythm, normal S1 S2, no S3 or S4, no murmur, click or rub, no peripheral edema and peripheral pulses strong  ABDOMEN: positive bowel sounds in all quadrants, diffuse generalized tenderness without guarding, tenderness most on left upper and lower quadrant, no organomegaly or masses, liver span normal to percussion and bowel sounds normal  MS: no gross musculoskeletal defects noted, no edema  SKIN: no suspicious lesions or rashes  NEURO: Normal strength and tone, mentation intact and speech normal  MENTAL STATUS EXAM  Appearance: appropriate  Attitude: cooperative  Behavior: normal  Eye Contact: poor  Speech: quiet  Orientation: oriented to person , place, time and situation  Affect: flat  Thought Process: clear      Diagnostic Test Results:  none     ASSESSMENT/PLAN:     (R25.1) Spell of shaking  (primary encounter diagnosis)  Comment:   Plan: Follow-up with neurology as planned.  No driving.    (R07.89) Atypical chest pain  Comment:   Plan: amitriptyline (ELAVIL) 50 MG tablet        Reordered with current dosage    (G47.00) Insomnia, unspecified type  Comment:   Plan: amitriptyline (ELAVIL) 50 MG tablet            (R10.9,  G89.29) Chronic abdominal pain  Comment:   Plan: LORazepam (ATIVAN) 1 MG tablet        No plan noted by PCP or pain management.  Patient has been taking a high amount for the past 6 weeks minimally and a moderate amount previous to that. Concern that she is actually experiencing benzodiazepine withdraw symptoms and doses seems to be escalating - one year ago she had 60 tablets last 4-6 months, 9 months ago 60 tablets lasted two months, and the most recent prescription of #90 lasted only two months.  With the high  current use and no clear plan, I did refill this prescription today.  Dicussed with doctors at this clinic and have decided to refer to addiction medicine to evaluate for dependence and help with a taper if not to completely discontinued to at minimum a much smaller dose.    (Z79.899) Chronic prescription benzodiazepine use  Comment:   Plan: ADDICTION MEDICINE REFERRAL              EVI Crook [facility]
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
## 2017-06-23 ENCOUNTER — TELEPHONE (OUTPATIENT)
Dept: FAMILY MEDICINE | Facility: CLINIC | Age: 22
End: 2017-06-23

## 2017-06-23 ENCOUNTER — OFFICE VISIT (OUTPATIENT)
Dept: FAMILY MEDICINE | Facility: CLINIC | Age: 22
End: 2017-06-23
Payer: COMMERCIAL

## 2017-06-23 VITALS
DIASTOLIC BLOOD PRESSURE: 78 MMHG | OXYGEN SATURATION: 100 % | SYSTOLIC BLOOD PRESSURE: 126 MMHG | TEMPERATURE: 97.7 F | HEART RATE: 101 BPM | BODY MASS INDEX: 25.86 KG/M2 | WEIGHT: 146 LBS

## 2017-06-23 DIAGNOSIS — R10.9 CHRONIC ABDOMINAL PAIN: ICD-10-CM

## 2017-06-23 DIAGNOSIS — R07.89 ATYPICAL CHEST PAIN: Primary | ICD-10-CM

## 2017-06-23 DIAGNOSIS — G89.29 CHRONIC ABDOMINAL PAIN: ICD-10-CM

## 2017-06-23 DIAGNOSIS — G47.00 INSOMNIA, UNSPECIFIED TYPE: ICD-10-CM

## 2017-06-23 DIAGNOSIS — Z79.899 CHRONIC PRESCRIPTION BENZODIAZEPINE USE: ICD-10-CM

## 2017-06-23 PROCEDURE — 99214 OFFICE O/P EST MOD 30 MIN: CPT | Performed by: NURSE PRACTITIONER

## 2017-06-23 RX ORDER — LORAZEPAM 1 MG/1
0.5 TABLET ORAL 2 TIMES DAILY PRN
Qty: 90 TABLET | Refills: 0 | Status: SHIPPED | OUTPATIENT
Start: 2017-06-23 | End: 2017-10-11

## 2017-06-23 RX ORDER — AMITRIPTYLINE HYDROCHLORIDE 50 MG/1
50 TABLET ORAL AT BEDTIME
Qty: 90 TABLET | Refills: 1 | Status: SHIPPED | OUTPATIENT
Start: 2017-06-23 | End: 2017-06-27

## 2017-06-23 NOTE — MR AVS SNAPSHOT
"              After Visit Summary   6/23/2017    Samara Oropeza    MRN: [id number]           Patient Information     Date Of Birth          1995        Visit Information        Provider Department      6/23/2017 2:45 PM Caitlin Carter APRN [facility] (formerly [facility])        Today's Diagnoses     Atypical chest pain    -  1    Insomnia, unspecified type        Chronic abdominal pain        Spell of shaking        Chronic prescription benzodiazepine use           Follow-ups after your visit        Additional Services     ADDICTION MEDICINE REFERRAL       The Addiction Medicine Service is prepared to provide consultation for and, if necessary, ongoing care for patients with the disease of Addiction.  As defined by the American Society of Addiction Medicine, Addiction is a primary, chronic disease of brain reward, motivation, memory and related circuitry.       Common problems that may warrant referral to the Addiction Medicine Service are:  Alcohol use disorder - diagnosis, treatment referral, acute and protracted withdrawal management, and ongoing medication assisted treatment including Antabuse and Naltrexone.  Opoid Use Disorder - medication assisted treatment including Buprenorphine (Suboxone) or extended release Naltrexone (Vivitrol)  Benzodiazepine dependence - extended outpatient detoxification  Many other issues pertaining to addiction, relapse, and recovery    Referrals to the Addiction Medicine Service assume that the referring provider has discussed the referral with the patient.  Referral will be reviewed and if appropriate, the patient will be contacted to schedule appointment.    Please answer the following questions so we can better service your patient:    Drug of choice: ativan - taking daily for two years for chronic abdominal pain, patient does not identify as a dependency, but has had what appears to be escalating doses and what I believe might be withdraw symptoms - \"seizures\" not " yet evaluated by neurology, non-cardiac chest pain.  She has seen pain management, but it does not appear that ativan usage was discussed or evaluated at that visit  Need for detox: No - but needs a taper  Need for ongoing addiction treatment: No  Do they have a [facility] PCP:  Yes   Are they willing to participate in a Suboxone group?  n/a    Please bring the following to your appointment:  >>   List of current medications   >>   Any relevant history                  Your next 10 appointments already scheduled     Jun 27, 2017 10:45 AM CDT   [facility] Visit with Cata Hurst NP   [facility]    [address]  [address]  [facility] [address]   [phone]            Jun 27, 2017  2:30 PM CDT   Return Visit with Layla Browne [facility] ([facility])    [address] [id number] [phone]            Jul 03, 2017 10:00 AM CDT   New Visit with Kimo Hudson LP   [facility] ([facility])    [address] [place] [address] [phone]            Jul 11, 2017 10:30 AM CDT   Return Visit with Layla Browne [facility] ([facility])    [address] [id number] [phone]            Aug 22, 2017 11:00 AM CDT   Return Visit with Austen Marquez MD   [facility] ([facility]    [address] [address]   [phone]            Sep 13, 2017  3:00 PM CDT   (Arrive by 2:45 PM)   Return Botox with Frederick Bender MD   [facility] ([facility])    [address] [facility] [address]  3rd Floor  [facility] [id number] [phone]              Who to contact     If you have questions or need  "follow up information about today's clinic visit or your schedule please contact [facility] directly at [phone].  Normal or non-critical lab and imaging results will be communicated to you by Embanethart, letter or phone within 4 business days after the clinic has received the results. If you do not hear from us within 7 days, please contact the clinic through Embanethart or phone. If you have a critical or abnormal lab result, we will notify you by phone as soon as possible.  Submit refill requests through Procam TV or call your pharmacy and they will forward the refill request to us. Please allow 3 business days for your refill to be completed.          Additional Information About Your Visit        EmbanetharSimfinit Information     Procam TV lets you send messages to your doctor, view your test results, renew your prescriptions, schedule appointments and more. To sign up, go to [email or web address] . Click on \"Log in\" on the left side of the screen, which will take you to the Welcome page. Then click on \"Sign up Now\" on the right side of the page.     You will be asked to enter the access code listed below, as well as some personal information. Please follow the directions to create your username and password.     Your access code is: [email or web address]  Expires: 2017  1:39 PM     Your access code will  in 90 days. If you need help or a new code, please call your [facility] clinic or [phone].        Care EveryWhere ID     This is your Care EveryWhere ID. This could be used by other organizations to access your [facility] medical records  [id number]        Your Vitals Were     Pulse Temperature Last Period Pulse Oximetry BMI (Body Mass Index)       101 97.7  F (36.5  C) (Oral) 2017 (Exact Date) 100% 25.86 kg/m2        Blood Pressure from Last 3 Encounters:   17 126/78   17 111/72   17 117/85    Weight from Last 3 Encounters:   17 146 lb (66.2 kg)   17 147 lb (66.7 kg) "   06/02/17 146 lb 9.6 oz (66.5 kg)              We Performed the Following     ADDICTION MEDICINE REFERRAL          Today's Medication Changes          These changes are accurate as of: 6/23/17  5:44 PM.  If you have any questions, ask your nurse or doctor.               These medicines have changed or have updated prescriptions.        Dose/Directions    * amitriptyline 25 MG tablet   Commonly known as:  ELAVIL   This may have changed:  Another medication with the same name was added. Make sure you understand how and when to take each.   Used for:  Atypical chest pain, Insomnia, unspecified type   Changed by:  Kacie Almeida APRN CNP        Dose:  25 mg   Take 1 tablet (25 mg) by mouth At Bedtime   Quantity:  45 tablet   Refills:  5       * amitriptyline 50 MG tablet   Commonly known as:  ELAVIL   This may have changed:  You were already taking a medication with the same name, and this prescription was added. Make sure you understand how and when to take each.   Used for:  Atypical chest pain, Insomnia, unspecified type   Changed by:  Caitlni Carter APRN CNP        Dose:  50 mg   Take 1 tablet (50 mg) by mouth At Bedtime   Quantity:  90 tablet   Refills:  1       LORazepam 1 MG tablet   Commonly known as:  ATIVAN   This may have changed:  when to take this   Used for:  Chronic abdominal pain   Changed by:  Caitlin Carter APRN CNP        Dose:  0.5 mg   Take 0.5 tablets (0.5 mg) by mouth 2 times daily as needed for other (abdominal pain) Do not operate a vehicle after taking this medication   Quantity:  90 tablet   Refills:  0       * Notice:  This list has 2 medication(s) that are the same as other medications prescribed for you. Read the directions carefully, and ask your doctor or other care provider to review them with you.         Where to get your medicines      These medications were sent to [place] [address] [place] [address] [address]     Phone:   [phone]     amitriptyline 50 MG tablet         Some of these will need a paper prescription and others can be bought over the counter.  Ask your nurse if you have questions.     Bring a paper prescription for each of these medications     LORazepam 1 MG tablet                Primary Care Provider Office Phone # Fax #    EVI Cardona [phone]       [facility] [address]  [facility] [address]        Equal Access to Services     NABIL GALEANO : Hadii aad ku hadasho Soomaali, waaxda luqadaha, qaybta kaalmada adeegyada, waxay idiin hayaan adeeg kharash la'arlet . [facility] [phone].    ATENCIÓN: Si habla español, tiene a livingston disposición servicios gratuitos de asistencia lingüística. [facility] [phone].    We comply with applicable federal civil rights laws and [place] laws. We do not discriminate on the basis of race, color, national origin, age, disability sex, sexual orientation or gender identity.            Thank you!     Thank you for choosing [facility]  for your care. Our goal is always to provide you with excellent care. Hearing back from our patients is one way we can continue to improve our services. Please take a few minutes to complete the written survey that you may receive in the mail after your visit with us. Thank you!             Your Updated Medication List - Protect others around you: Learn how to safely use, store and throw away your medicines at www.disposemymeds.org.          This list is accurate as of: 6/23/17  5:44 PM.  Always use your most recent med list.                   Brand Name Dispense Instructions for use Diagnosis    albuterol 108 (90 BASE) MCG/ACT Inhaler    PROAIR HFA/PROVENTIL HFA/VENTOLIN HFA    1 Inhaler    Inhale 2 puffs into the lungs every 6 hours as needed for shortness of breath / dyspnea or wheezing    Atypical chest pain       * amitriptyline 25 MG tablet    ELAVIL    45 tablet    Take 1 tablet  (25 mg) by mouth At Bedtime    Atypical chest pain, Insomnia, unspecified type       * amitriptyline 50 MG tablet    ELAVIL    90 tablet    Take 1 tablet (50 mg) by mouth At Bedtime    Atypical chest pain, Insomnia, unspecified type       apremilast 30 MG tablet    OTEZLA    60 tablet    20 mg in AM and 30mg in PM daily X 2 weeks and then 30mg twice daily.    Behcet's disease (H)       ASPIRIN CHILDRENS 81 MG chewable tablet   Generic drug:  aspirin      Take 81 mg by mouth daily        benzonatate 100 MG capsule    TESSALON    42 capsule    Take 1-2 pills THREE TIMES PER DAY as needed for cough    Acute bronchitis with symptoms > 10 days       betamethasone valerate 0.1 % cream    VALISONE     Apply topically 2 times daily        * botulinum toxin type A 100 UNITS injection    BOTOX    200 Units    Inject 200 Units into the muscle every 3 months    Cervical dystonia       * BOTOX IJ      Inject 150 Units into the muscle once Lot: /C3 Exp: 05/2019        * BOTOX IJ      Inject 150 Units into the muscle Lot # /C3  Exp: 8/2019        * BOTOX IJ      Inject 162.5 Units as directed once Lot #: /C3 Exp: 10/2019        * ONABOTULINUMTOXINA IJ      Inject 165 Units as directed once Lot # /C3 Expiration Date: 01/2020        carbamide peroxide 6.5 % otic solution    DEBROX     5 drops 2 times daily        clobetasol 0.05 % cream    TEMOVATE    60 g    Apply topically 2 times daily    Folliculitis       Colchicine 0.6 MG Caps     90 capsule    Take 0.6 mg by mouth See Admin Instructions 2 capsules in AM and 1 capsule in PM    Behcet's syndrome (H)       dexamethasone 4 MG/ML injection    DECADRON    30 mL    Apply 1 mL (4 mg) topically as needed    Sprain of other ligament of left ankle, initial encounter       diclofenac 1 % Gel topical gel    VOLTAREN    100 g    Apply 2-4 grams to affected area(s) up to 4 times per day as needed. This is an anti-inflammatory medication.    Polyarthralgia        dicyclomine 20 MG tablet    BENTYL    60 tablet    Take 1 tablet (20 mg) by mouth 4 times daily as needed    Abdominal cramping       doxycycline 100 MG capsule    VIBRAMYCIN    20 capsule    Take 1 capsule (100 mg) by mouth 2 times daily    Acute bronchitis with symptoms > 10 days       EPINEPHrine 0.3 MG/0.3ML injection    EPIPEN 2-EAMON    0.6 mL    Inject 0.3 mLs (0.3 mg) into the muscle as needed for anaphylaxis    Hx of bee sting allergy       EXCEDRIN MIGRAINE PO      Take by mouth as needed        guanFACINE 1 MG tablet    TENEX    180 tablet    Take 3 tablets (3 mg) by mouth twice daily in the morning and evening daily.    Tic       hyoscyamine 0.125 MG tablet    ANASPAZ/LEVSIN    40 tablet    Take 1-2 tablets (125-250 mcg) by mouth every 4 hours as needed for cramping    Abdominal pain, generalized       hypromellose 0.3 % Soln ophthalmic solution    GENTEAL     1 drop every hour as needed        LACTAID PO      Take by mouth daily        * lactulose 20 GM/30ML Soln     300 mL    Take 30 mLs by mouth 3 times daily as needed        * lactulose 10 GM/15ML solution    CHRONULAC     Take 20 g by mouth        lidocaine 2 % topical gel    XYLOCAINE     Apply 1 Tube topically daily Reported on 4/21/2017        lidocaine visc 2% 2.5mL/5mL & maalox/mylanta w/ simeth 2.5mL/5mL & diphenhydrAMINE 5mg/5mL Susp suspension    [facility] Mouthwash [facility]    1 Bottle    Swish and swallow 10 mLs in mouth every 6 hours as needed for mouth sores    Behcet's syndrome (H)       linaclotide 290 MCG capsule    LINZESS    30 capsule    Take 1 capsule (290 mcg) by mouth every morning (before breakfast)    Irritable bowel syndrome       LORazepam 1 MG tablet    ATIVAN    90 tablet    Take 0.5 tablets (0.5 mg) by mouth 2 times daily as needed for other (abdominal pain) Do not operate a vehicle after taking this medication    Chronic abdominal pain       meclizine 25 MG tablet    ANTIVERT    30 tablet    Take 1 tablet (25 mg) by mouth  every 6 hours as needed for dizziness    Nausea       metaxalone 800 MG tablet    SKELAXIN    30 tablet    Take 0.5 tablets (400 mg) by mouth 3 times daily as needed for moderate pain    Sprain of neck, initial encounter, Cervical dystonia       norgestimate-ethinyl estradiol 0.25-35 MG-MCG per tablet    ORTHO-CYCLEN, SPRINTEC    84 tablet    Take 1 tablet by mouth daily    General counseling for prescription of oral contraceptives       omeprazole 40 MG capsule    priLOSEC    90 capsule    Take 1 capsule (40 mg) by mouth daily    Gastroesophageal reflux disease, esophagitis presence not specified       * ondansetron 8 MG ODT tab    ZOFRAN-ODT    60 tablet    Take 1 tablet (8 mg) by mouth every 8 hours as needed for nausea    Non-intractable vomiting with nausea, unspecified vomiting type, Hematemesis, presence of nausea not specified       * ondansetron 4 MG ODT tab    ZOFRAN-ODT    60 tablet    Take 1 tablet (4 mg) by mouth every 6 hours as needed for nausea    Chronic abdominal pain, Gastritis, presence of bleeding unspecified, unspecified chronicity, unspecified gastritis type, Nausea and vomiting, intractability of vomiting not specified, unspecified vomiting type       promethazine 25 MG tablet    PHENERGAN    20 tablet    Take 0.5-1 tablets (12.5-25 mg) by mouth every 6 hours as needed for nausea    Intractable chronic migraine without aura and without status migrainosus       RANITIDINE 75 PO      Take  by mouth. As needed for GI upset        sucralfate 1 GM/10ML suspension    CARAFATE    1200 mL    Take 10 mLs (1 g) by mouth 4 times daily    Bile reflux gastritis, Nausea       SUMAtriptan 100 MG tablet    IMITREX    18 tablet    Take 1 tablet (100 mg) by mouth at onset of headache for migraine May repeat in 2 hours. Max 2 tablets/24 hours.    Intractable chronic migraine without aura and without status migrainosus       triamcinolone 0.1 % cream    KENALOG    15 g    Apply sparingly to oral ulcers three  times daily for 14 days as needed.    Behcet's syndrome (H)       * Notice:  This list has 11 medication(s) that are the same as other medications prescribed for you. Read the directions carefully, and ask your doctor or other care provider to review them with you.

## 2017-06-23 NOTE — TELEPHONE ENCOUNTER
Please call patient.  I looked back at the quantity of ativan and noted that in the past year it has at least doubled. I discussed her case with other providers here because I have concern that she is having withdraw symptoms which has now led to escalating dosing of the medication.  Continuing at this dose is not a safe plan.  I put in a referral to addiction medicine, not because I think that she is addicted, but because I think her body developed dependence on this medication, that her symptoms are a sign of withdrawal, and that we need the expertise of addiction medicine doctors to taper down the dose.  I need her to see one of the addiction specialists within the next 2-3 weeks.  They should have easy access.  I will not refill the ativan again without an addiction medicine consultation, but as she has a large quantity and would not need to come in for sometime, I also would emphasize that I want her to see them sooner than when a refill is needed.  JUVENTINO Carter, [place]

## 2017-06-23 NOTE — NURSING NOTE
"Chief Complaint   Patient presents with     ER F/U     Recheck Medication       Initial /78 (BP Location: Right arm, Patient Position: Chair, Cuff Size: Adult Regular)  Pulse 101  Temp 97.7  F (36.5  C) (Oral)  Wt 146 lb (66.2 kg)  LMP 05/01/2017 (Exact Date)  SpO2 100%  BMI 25.86 kg/m2 Estimated body mass index is 25.86 kg/(m^2) as calculated from the following:    Height as of 6/6/17: 5' 3\" (1.6 m).    Weight as of this encounter: 146 lb (66.2 kg).  Medication Reconciliation: complete       Omar Patel MA      "

## 2017-06-26 NOTE — TELEPHONE ENCOUNTER
Route to Cara    Pt was not happy with the message from the provider, she wouldn't listen to the whole message, she stated she is not addicted or dependent on the medication, she stated the provider that saw her last doesn't even know her. I again reiterated the message from the provider. She asked me to call her and leave a message with the referral as she wasn't wasting anymore time with me. I let her know on the VM that the addiction medicine clinic would be calling her. And if she had any further questions/concerns to call [facility] clinic    Britni Matamoros RN

## 2017-06-27 ENCOUNTER — OFFICE VISIT (OUTPATIENT)
Dept: BEHAVIORAL HEALTH | Facility: CLINIC | Age: 22
End: 2017-06-27
Payer: COMMERCIAL

## 2017-06-27 ENCOUNTER — OFFICE VISIT (OUTPATIENT)
Dept: PSYCHIATRY | Facility: CLINIC | Age: 22
End: 2017-06-27
Attending: NURSE PRACTITIONER
Payer: COMMERCIAL

## 2017-06-27 VITALS
OXYGEN SATURATION: 100 % | TEMPERATURE: 98.1 F | HEIGHT: 63 IN | WEIGHT: 146 LBS | DIASTOLIC BLOOD PRESSURE: 60 MMHG | SYSTOLIC BLOOD PRESSURE: 100 MMHG | HEART RATE: 139 BPM | BODY MASS INDEX: 25.87 KG/M2

## 2017-06-27 DIAGNOSIS — F41.1 GAD (GENERALIZED ANXIETY DISORDER): Primary | ICD-10-CM

## 2017-06-27 DIAGNOSIS — F43.10 PTSD (POST-TRAUMATIC STRESS DISORDER): ICD-10-CM

## 2017-06-27 DIAGNOSIS — F33.1 MODERATE EPISODE OF RECURRENT MAJOR DEPRESSIVE DISORDER (H): Primary | ICD-10-CM

## 2017-06-27 DIAGNOSIS — F41.1 GAD (GENERALIZED ANXIETY DISORDER): ICD-10-CM

## 2017-06-27 DIAGNOSIS — F95.9 TIC: ICD-10-CM

## 2017-06-27 DIAGNOSIS — M35.2 BEHCET'S DISEASE (H): ICD-10-CM

## 2017-06-27 DIAGNOSIS — F33.1 MAJOR DEPRESSIVE DISORDER, RECURRENT EPISODE, MODERATE (H): ICD-10-CM

## 2017-06-27 PROCEDURE — 90792 PSYCH DIAG EVAL W/MED SRVCS: CPT | Performed by: NURSE PRACTITIONER

## 2017-06-27 PROCEDURE — 90834 PSYTX W PT 45 MINUTES: CPT | Performed by: SOCIAL WORKER

## 2017-06-27 RX ORDER — DIPHENHYDRAMINE HCL 25 MG
25 TABLET ORAL
COMMUNITY
End: 2017-08-01

## 2017-06-27 RX ORDER — BUSPIRONE HYDROCHLORIDE 10 MG/1
TABLET ORAL
Qty: 60 TABLET | Refills: 1 | Status: SHIPPED | OUTPATIENT
Start: 2017-06-27 | End: 2017-08-16

## 2017-06-27 ASSESSMENT — ANXIETY QUESTIONNAIRES
GAD7 TOTAL SCORE: 15
1. FEELING NERVOUS, ANXIOUS, OR ON EDGE: NEARLY EVERY DAY
5. BEING SO RESTLESS THAT IT IS HARD TO SIT STILL: MORE THAN HALF THE DAYS
6. BECOMING EASILY ANNOYED OR IRRITABLE: MORE THAN HALF THE DAYS
7. FEELING AFRAID AS IF SOMETHING AWFUL MIGHT HAPPEN: MORE THAN HALF THE DAYS
IF YOU CHECKED OFF ANY PROBLEMS ON THIS QUESTIONNAIRE, HOW DIFFICULT HAVE THESE PROBLEMS MADE IT FOR YOU TO DO YOUR WORK, TAKE CARE OF THINGS AT HOME, OR GET ALONG WITH OTHER PEOPLE: VERY DIFFICULT
3. WORRYING TOO MUCH ABOUT DIFFERENT THINGS: MORE THAN HALF THE DAYS
2. NOT BEING ABLE TO STOP OR CONTROL WORRYING: SEVERAL DAYS

## 2017-06-27 ASSESSMENT — PATIENT HEALTH QUESTIONNAIRE - PHQ9: 5. POOR APPETITE OR OVEREATING: NEARLY EVERY DAY

## 2017-06-27 NOTE — MR AVS SNAPSHOT
After Visit Summary   6/27/2017    Samara Oropeza    MRN: [id number]           Patient Information     Date Of Birth          1995        Visit Information        Provider Department      6/27/2017 10:45 AM Cata Hurst NP [facility]        Today's Diagnoses     TAHIR (generalized anxiety disorder)    -  1    Tics - Tourette syndrome        PTSD (post-traumatic stress disorder)        Major depressive disorder, recurrent episode, moderate (H)          Care Instructions    Treatment Plan:    Start Buspar (buspirone) 5 mg by mouth two times per day for one week, then increase to 10 mg by mouth two times per day. For anxiety.     Continue Intuniv (guanfacine) 3 mg two times per day per primary care provider.    Continue Ativan (lorazepam) per primary care provider.     Continue all other medications as reviewed per electronic medical record today.     All questions addressed. Education and counseling completed regarding risks and benefits of medications and psychotherapy options.    Safety plan was reviewed. To the Emergency Department as needed or call after hours crisis line at [phone] or [phone].     Continue individual/group therapy as planned with Layla Browne.     Schedule an appointment with me in 6-8 weeks or sooner as needed.  Call [facility] at [phone] to schedule.    Follow up with primary care provider as planned or for acute medical concerns.    Call the psychiatric nurse line with medication questions or concerns at [phone].    My Practice Policy was reviewed and signed: YES     MyChart may be used to communicate with your provider, but this is not intended to be used for emergencies.          Follow-ups after your visit        Additional Services     CARE COORDINATION REFERRAL       Services are provided by a Care Coordinator for people with complex needs such as: medical, social, or financial troubles.  The Care  Coordinator works with the patient and their Primary Care Provider to determine health goals, obtain resources, achieve outcomes, and develop care plans that help coordinate the patient's care.     Reason for Referral: Concerns with ADLs/IADLs, Frequent Hospital / SNF / ED Visits, Home Safety Concerns, Other Financial Concerns and Uncontrolled Chronic Disease- transportation assistance if available. Needs assistance with navigating health care system and any additional resources may be eligible for.    Provide additional details for Care Coordination to best meet the patient's current needs: [facility]. Many medical comorbidities.     Clinical Staff have discussed the Care Coordination Referral with the patient and/or caregiver: yes                  Follow-up notes from your care team     Return in about 6 weeks (around 8/8/2017).      Your next 10 appointments already scheduled     Jun 27, 2017  2:30 PM CDT   Return Visit with ALISA Burt   [facility] ([facility])    [address] [id number] [phone]            Jul 03, 2017 10:00 AM CDT   New Visit with Kimo Hudson LP   [facility] ([facility])    [address] [place] [address]   [phone]            Jul 11, 2017 10:30 AM CDT   Return Visit with ALISA Burt   [facility] ([facility])    [address] [address]   [phone]            Aug 22, 2017 11:00 AM CDT   [facility] Autsen [facility]    [address] [address]   [phone]            Sep 13, 2017  3:00 PM CDT   (Arrive by 2:45 PM)   Return Botox with Frederick Bender MD   [facility] ([facility]    [address]  [address] [facility] [address] [phone]              Who to contact     If you have questions or need follow up information about today's clinic visit or your schedule please contact [facility] directly at [phone].  Normal or non-critical lab and imaging results will be communicated to you by MyChart, letter or phone within 4 business days after the clinic has received the results. If you do not hear from us within 7 days, please contact the clinic through MyChart or phone. If you have a critical or abnormal lab result, we will notify you by phone as soon as possible.  Submit refill requests through [employer] or call your pharmacy and they will forward the refill request to us. Please allow 3 business days for your refill to be completed.          Additional Information About Your Visit        MyChart Information     [employer] lets you send messages to your doctor, view your test results, renew your prescriptions, schedule appointments and more. To sign up, go to [email or web address] . Click on \"Log in\" on the left side of the screen, which will take you to the Welcome page. Then click on \"Sign up Now\" on the right side of the page.     You will be asked to enter the access code listed below, as well as some personal information. Please follow the directions to create your username and password.     Your access code is: [email or web address]  Expires: 2017  1:39 PM     Your access code will  in 90 days. If you need help or a new code, please call your [facility] or [phone].        Care EveryWhere ID     This is your Care EveryWhere ID. This could be used by other organizations to access your [facility] medical records  [id number]        Your Vitals Were     Pulse Temperature Height Last Period Pulse Oximetry BMI (Body Mass Index)    139 98.1  F (36.7  C) (Oral) 5' 3\" (1.6 m) 2017 100% 25.86 kg/m2       Blood " Pressure from Last 3 Encounters:   06/27/17 100/60   06/23/17 126/78   06/06/17 111/72    Weight from Last 3 Encounters:   06/27/17 146 lb (66.2 kg)   06/23/17 146 lb (66.2 kg)   06/06/17 147 lb (66.7 kg)              We Performed the Following     CARE COORDINATION REFERRAL          Today's Medication Changes          These changes are accurate as of: 6/27/17 12:03 PM.  If you have any questions, ask your nurse or doctor.               Start taking these medicines.        Dose/Directions    busPIRone 10 MG tablet   Commonly known as:  BUSPAR   Used for:  TAHIR (generalized anxiety disorder)   Started by:  Cata Hurst NP        Start 1/2 tablet by mouth two times per day for one week, then increase to 1 tablet by mouth two times per day. For anxiety.   Quantity:  60 tablet   Refills:  1         These medicines have changed or have updated prescriptions.        Dose/Directions    ondansetron 8 MG ODT tab   Commonly known as:  ZOFRAN-ODT   This may have changed:  Another medication with the same name was removed. Continue taking this medication, and follow the directions you see here.   Used for:  Non-intractable vomiting with nausea, unspecified vomiting type, Hematemesis, presence of nausea not specified   Changed by:  Sonja Abreu APRN CNP        Dose:  8 mg   Take 1 tablet (8 mg) by mouth every 8 hours as needed for nausea   Quantity:  60 tablet   Refills:  11            Where to get your medicines      These medications were sent to [place] [address] [place] [address] [address]     Phone:  [phone]     busPIRone 10 MG tablet                Primary Care Provider Office Phone # Fax #    EVI Cardona [phone]       [facility] [address]  [facility] [address]        Equal Access to Services     NABIL GALEANO AH: Brandon Santiago, channing hurtado, mike maloney  ruy rodriguez ah. So [facility] [phone].    ATENCIÓN: Si henry nuñez, tiene a livingston disposición servicios gratuitos de asistencia lingüística. Jesus bullard [phone].    We comply with applicable federal civil rights laws and [place] laws. We do not discriminate on the basis of race, color, national origin, age, disability sex, sexual orientation or gender identity.            Thank you!     Thank you for choosing [facility]  for your care. Our goal is always to provide you with excellent care. Hearing back from our patients is one way we can continue to improve our services. Please take a few minutes to complete the written survey that you may receive in the mail after your visit with us. Thank you!             Your Updated Medication List - Protect others around you: Learn how to safely use, store and throw away your medicines at www.disposemymeds.org.          This list is accurate as of: 6/27/17 12:03 PM.  Always use your most recent med list.                   Brand Name Dispense Instructions for use Diagnosis    albuterol 108 (90 BASE) MCG/ACT Inhaler    PROAIR HFA/PROVENTIL HFA/VENTOLIN HFA    1 Inhaler    Inhale 2 puffs into the lungs every 6 hours as needed for shortness of breath / dyspnea or wheezing    Atypical chest pain       amitriptyline 25 MG tablet    ELAVIL    45 tablet    Take 1 tablet (25 mg) by mouth At Bedtime    Atypical chest pain, Insomnia, unspecified type       apremilast 30 MG tablet    OTEZLA    60 tablet    20 mg in AM and 30mg in PM daily X 2 weeks and then 30mg twice daily.    Behcet's disease (H)       ASPIRIN CHILDRENS 81 MG chewable tablet   Generic drug:  aspirin      Take 81 mg by mouth daily        BENADRYL 25 MG tablet   Generic drug:  diphenhydrAMINE      Take 25 mg by mouth nightly as needed for itching or allergies        benzonatate 100 MG capsule    TESSALON    42 capsule    Take 1-2 pills THREE TIMES PER DAY as needed for cough    Acute  bronchitis with symptoms > 10 days       betamethasone valerate 0.1 % cream    VALISONE     Apply topically 2 times daily        * botulinum toxin type A 100 UNITS injection    BOTOX    200 Units    Inject 200 Units into the muscle every 3 months    Cervical dystonia       * BOTOX IJ      Inject 150 Units into the muscle once Lot: /C3 Exp: 05/2019        * BOTOX IJ      Inject 150 Units into the muscle Lot # /C3  Exp: 8/2019        * BOTOX IJ      Inject 162.5 Units as directed once Lot #: /C3 Exp: 10/2019        * ONABOTULINUMTOXINA IJ      Inject 165 Units as directed once Lot # /C3 Expiration Date: 01/2020        busPIRone 10 MG tablet    BUSPAR    60 tablet    Start 1/2 tablet by mouth two times per day for one week, then increase to 1 tablet by mouth two times per day. For anxiety.    TAHIR (generalized anxiety disorder)       carbamide peroxide 6.5 % otic solution    DEBROX     5 drops 2 times daily        clobetasol 0.05 % cream    TEMOVATE    60 g    Apply topically 2 times daily    Folliculitis       Colchicine 0.6 MG Caps     90 capsule    Take 0.6 mg by mouth See Admin Instructions 2 capsules in AM and 1 capsule in PM    Behcet's syndrome (H)       dexamethasone 4 MG/ML injection    DECADRON    30 mL    Apply 1 mL (4 mg) topically as needed    Sprain of other ligament of left ankle, initial encounter       diclofenac 1 % Gel topical gel    VOLTAREN    100 g    Apply 2-4 grams to affected area(s) up to 4 times per day as needed. This is an anti-inflammatory medication.    Polyarthralgia       dicyclomine 20 MG tablet    BENTYL    60 tablet    Take 1 tablet (20 mg) by mouth 4 times daily as needed    Abdominal cramping       doxycycline 100 MG capsule    VIBRAMYCIN    20 capsule    Take 1 capsule (100 mg) by mouth 2 times daily    Acute bronchitis with symptoms > 10 days       EPINEPHrine 0.3 MG/0.3ML injection    EPIPEN 2-EAMON    0.6 mL    Inject 0.3 mLs (0.3 mg) into the muscle as needed  for anaphylaxis    Hx of bee sting allergy       EXCEDRIN MIGRAINE PO      Take by mouth as needed        guanFACINE 1 MG tablet    TENEX    180 tablet    Take 3 tablets (3 mg) by mouth twice daily in the morning and evening daily.    Tic       hyoscyamine 0.125 MG tablet    ANASPAZ/LEVSIN    40 tablet    Take 1-2 tablets (125-250 mcg) by mouth every 4 hours as needed for cramping    Abdominal pain, generalized       hypromellose 0.3 % Soln ophthalmic solution    GENTEAL     1 drop every hour as needed        LACTAID PO      Take by mouth daily        lactulose 20 GM/30ML Soln     300 mL    Take 30 mLs by mouth 3 times daily as needed        lidocaine 2 % topical gel    XYLOCAINE     Apply 1 Tube topically daily Reported on 4/21/2017        lidocaine visc 2% 2.5mL/5mL & maalox/mylanta w/ simeth 2.5mL/5mL & diphenhydrAMINE 5mg/5mL Susp suspension    [facility] Mouthwash [facility]    1 Bottle    Swish and swallow 10 mLs in mouth every 6 hours as needed for mouth sores    Behcet's syndrome (H)       linaclotide 290 MCG capsule    LINZESS    30 capsule    Take 1 capsule (290 mcg) by mouth every morning (before breakfast)    Irritable bowel syndrome       LORazepam 1 MG tablet    ATIVAN    90 tablet    Take 0.5 tablets (0.5 mg) by mouth 2 times daily as needed for other (abdominal pain) Do not operate a vehicle after taking this medication    Chronic abdominal pain       meclizine 25 MG tablet    ANTIVERT    30 tablet    Take 1 tablet (25 mg) by mouth every 6 hours as needed for dizziness    Nausea       metaxalone 800 MG tablet    SKELAXIN    30 tablet    Take 0.5 tablets (400 mg) by mouth 3 times daily as needed for moderate pain    Sprain of neck, initial encounter, Cervical dystonia       norgestimate-ethinyl estradiol 0.25-35 MG-MCG per tablet    ORTHO-CYCLEN, SPRINTEC    84 tablet    Take 1 tablet by mouth daily    General counseling for prescription of oral contraceptives       omeprazole 40 MG capsule    priLOSEC    90  capsule    Take 1 capsule (40 mg) by mouth daily    Gastroesophageal reflux disease, esophagitis presence not specified       ondansetron 8 MG ODT tab    ZOFRAN-ODT    60 tablet    Take 1 tablet (8 mg) by mouth every 8 hours as needed for nausea    Non-intractable vomiting with nausea, unspecified vomiting type, Hematemesis, presence of nausea not specified       promethazine 25 MG tablet    PHENERGAN    20 tablet    Take 0.5-1 tablets (12.5-25 mg) by mouth every 6 hours as needed for nausea    Intractable chronic migraine without aura and without status migrainosus       RANITIDINE 75 PO      Take  by mouth. As needed for GI upset        sucralfate 1 GM/10ML suspension    CARAFATE    1200 mL    Take 10 mLs (1 g) by mouth 4 times daily    Bile reflux gastritis, Nausea       SUMAtriptan 100 MG tablet    IMITREX    18 tablet    Take 1 tablet (100 mg) by mouth at onset of headache for migraine May repeat in 2 hours. Max 2 tablets/24 hours.    Intractable chronic migraine without aura and without status migrainosus       triamcinolone 0.1 % cream    KENALOG    15 g    Apply sparingly to oral ulcers three times daily for 14 days as needed.    Behcet's syndrome (H)       * Notice:  This list has 5 medication(s) that are the same as other medications prescribed for you. Read the directions carefully, and ask your doctor or other care provider to review them with you.

## 2017-06-27 NOTE — PROGRESS NOTES
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 anxiety, depression, and chronic pain. Consider sleep consultation in future to rule out sleep apnea and other parasomnias.     Treatment Plan:    Start Buspar (buspirone) 5 mg by mouth two times per day for one week, then increase to 10 mg by mouth two times per day. For anxiety.     Continue Intuniv (guanfacine) 3 mg two times per day per primary care provider.    Continue Ativan (lorazepam) per primary care provider.     Continue all other medications as reviewed per electronic medical record today.     All questions addressed. Education and counseling completed regarding risks and benefits of medications and psychotherapy options.    Safety plan was reviewed. To the Emergency Department as needed or call after hours crisis line at [phone] or [phone].     Continue individual/group therapy as planned with Layla Browne.     Schedule an appointment with me in 6-8 weeks or sooner as needed.  Call [facility] at [phone] to schedule.    Follow up with primary care provider as planned or for acute medical concerns.    Call the psychiatric nurse line with medication questions or concerns at [phone].    My  Practice Policy was reviewed and signed: YES     MyChart may be used to communicate with your provider, but this is not intended to be used for emergencies.    Additional Community Resources:    [facility] Crisis Team (24 hour/7days a week): 978.731.7991  Crisis Intervention: 302.822.1867 or 002-310-5310 (TTY: 892.390.9882). Call anytime for help.    [employer] [place] [employer] (www.[place].clarisse.org): 774.224.1900 or 929-647-5615.   Mental Health Consumer/Survivor Network of [place] (www.mhcsn.net): 611.202.1428 or 525-840-5716    Mental Health Association of [place] (www.mentalhealth.org): 368.604.9661 or 882-974-6406    Administrative Billing:   Time spent with patient and her boyfriend was 60 minutes and greater than 50% of time or 40 minutes was spent in counseling and coordination of care.    Patient Status:  Patient will continue to be seen for ongoing consultation and stabilization.    Signed:   Cata Hurst, PhD, APRN, CNP  Psychiatry

## 2017-06-27 NOTE — PATIENT INSTRUCTIONS
Treatment Plan:    Start Buspar (buspirone) 5 mg by mouth two times per day for one week, then increase to 10 mg by mouth two times per day. For anxiety.     Continue Intuniv (guanfacine) 3 mg two times per day per primary care provider.    Continue Ativan (lorazepam) per primary care provider.     Continue all other medications as reviewed per electronic medical record today.     All questions addressed. Education and counseling completed regarding risks and benefits of medications and psychotherapy options.    Safety plan was reviewed. To the Emergency Department as needed or call after hours crisis line at [phone] or [phone].     Continue individual/group therapy as planned with Layla Browne.     Schedule an appointment with me in 6-8 weeks or sooner as needed.  Call [facility] at [phone] to schedule.    Follow up with primary care provider as planned or for acute medical concerns.    Call the psychiatric nurse line with medication questions or concerns at [phone].    My Practice Policy was reviewed and signed: YES     [employer] may be used to communicate with your provider, but this is not intended to be used for emergencies.

## 2017-06-27 NOTE — PROGRESS NOTES
Progress Note    Client Name: Samara Oropeza  Date: 6/27/2017              Service Type: Individual      Session Start Time: 2:35  Session End Time: 3:30      Session Length: 45     Session #: 12     Attendees: Client attended alone    Treatment Plan Last Reviewed: 6/27/2017    PHQ-9 / TAHIR-7 : 6/6/2017       DATA      Progress Since Last Session (Related to Symptoms / Goals / Homework):   Symptoms: Stable    Homework: Partially completed- met with psychiatry, provider is now on maternity leave      Episode of Care Goals: Satisfactory progress - ACTION (Actively working towards change); Intervened by reinforcing change plan / affirming steps taken     Current / Ongoing Stressors and Concerns:  Client reports that she had six ED visits this past week.  Stress after an experiencing with a new provider that is questioning his Ativan use.  She did talk to psychiatry provider.  Client is having challenging at work- her client is having increased behavioral problems.  Nightmares and sleeping continue to be a challenge.      Treatment Objective(s) Addressed in This Session:   use cognitive strategies identified in therapy to challenge anxious thoughts  Increase restful sleep     Intervention:   CBT: refocus on self-care        ASSESSMENT: Current Emotional / Mental Status (status of significant symptoms):   Risk status (Self / Other harm or suicidal ideation)   Client denies current fears or concerns for personal safety.   Client denies current or recent suicidal ideation or behaviors. Had one day when passive SI thought- talked with her bf    Client denies current or recent homicidal ideation or behaviors.   Client denies current or recent self injurious behavior or ideation.   Client denies other safety concerns.   A safety and risk management plan has not been developed at this time, however client was given the after-hours number / 911 should there be a change in any of  "these risk factors.     Appearance:   Appropriate    Eye Contact:   Fair    Psychomotor Behavior: Normal    Attitude:   Cooperative    Orientation:   All   Speech    Rate / Production: Monotone     Volume:  Soft    Mood:    Anxious    Affect:    Flat    Thought Content:  Clear    Thought Form:  Coherent  Logical    Insight:    Fair      Medication Review:   Changes to psychiatric medications, see updated Medication List in EPIC.      Medication Compliance:   Yes will need to verify inactive ingredients, or will call provider to start other medications     Changes in Health Issues:   Yes: Pain, Associated Psychological Distress     Chemical Use Review:   Substance Use: Chemical use reviewed, no active concerns identified      Tobacco Use: No current tobacco use.       Collateral Reports Completed:   Not Applicable    PLAN: (Client Tasks / Therapist Tasks / Other)  Homework:      1.  Start new medication  2.  Work on \"letting things be\".      Layla Browne, LICSW                                                         ________________________________________________________________________    Treatment Plan    Client's Name: Samara Oropeza  YOB: 1995    Date: 3/14/2017     DSM-V Diagnoses: 296.32 Major Depressive Disorder, Recurrent Episode, Moderate _ or 300.02 (F41.1) Generalized Anxiety Disorder  Psychosocial / Contextual Factors: Moderate- Minimal contact with family, Health issues  WHODAS: 30    Referral / Collaboration:  Referral to another professional/service is not indicated at this time..    Anticipated number of session or this episode of care: 12      MeasurableTreatment Goal(s) related to diagnosis / functional impairment(s)  Goal 1: Client will decrease anxiety symptoms as eveidenced by self-report and TAHIR-7 scores, currently at 14, goal to 7 or below    I will know I've met my goal when I am better able to deal with it.      Objective #A (Client Action)    Client will " use relaxation strategies 3x times per day to reduce the physical symptoms of anxiety.  Status: Continued - Date(s): 6/27/2017      Intervention(s)  Therapist will teach different relaxation strategies and have client complete one between session.    Objective #B  Client will use cognitive strategies identified in therapy to challenge anxious thoughts.  Status: Continued - Date(s): 6/27/2017      Intervention(s)  Therapist will 1.  introduce cognitive distortions; 2. build awareness for client; 3. leanr an implement diffferent cognitive restructuring techniques.    Objective #C  Client will increase her amount of restful sleep.  Status: Continued - Date(s): 6/27/2017      Intervention(s)  Therapist will 1. start a sleep journal; 2. Introduce techniques for falling and staying asleep.        Client has reviewed and agreed to the above plan.      Layla Browne, [facility]  March 14, 2017

## 2017-06-27 NOTE — NURSING NOTE
"Chief Complaint   Patient presents with     Consult     Referred by Sonja Abreu-Anxiety/depression,        Initial /60 (BP Location: Right arm, Patient Position: Chair, Cuff Size: Adult Regular)  Pulse 139  Temp 98.1  F (36.7  C) (Oral)  Ht 5' 3\" (1.6 m)  Wt 146 lb (66.2 kg)  LMP 06/01/2017  SpO2 100%  BMI 25.86 kg/m2 Estimated body mass index is 25.86 kg/(m^2) as calculated from the following:    Height as of this encounter: 5' 3\" (1.6 m).    Weight as of this encounter: 146 lb (66.2 kg).  Medication Reconciliation: complete    "

## 2017-06-27 NOTE — Clinical Note
Phu Casillas and Layla Browne Thank you for the Psychiatry referral to the [facility] ([facility]). Our psychiatry providers act as a specialty service for Primary Care Providers in the [facility] that seek to optimize medications for unstable patients.  Once medications have been optimized, our providers discharge the patient back to the referring Primary Care Provider for ongoing medication management.  This type of system allows our providers to serve a high volume of patients.   Please see my Impression and Plan.  If you have any questions or concerns, please let me know.  Cata

## 2017-06-27 NOTE — MR AVS SNAPSHOT
MRN:[id number]                      After Visit Summary   6/27/2017    Samara Oropeza    MRN: [id number]           Visit Information        Provider Department      6/27/2017 2:30 PM Layla Browne LICSW [facility] Generic      Your next 10 appointments already scheduled     Jul 03, 2017 10:00 AM CDT   New Visit with Kimo Hudson LP   [facility] ([facility])    [address] [place], [place] [address]  [facility] [address]  [facility] [address]   [phone]            Jul 11, 2017 10:30 AM CDT   Return Visit with Layla Browne [facility] ([facility])    [address] [id number] [phone]            Jul 18, 2017  2:30 PM CDT   Return Visit with Layla Browne [facility] ([facility])    [address] [id number] [phone]            Aug 01, 2017  2:30 PM CDT   Return Visit with Layla Browne [facility] ([facility])    [address] [id number] [phone]            Aug 08, 2017 10:30 AM CDT   Return Visit with Layla Browne [facility] ([facility])    [address] [id number] [phone]            Aug 16, 2017  2:45 PM CDT   [facility] with Cata Hurst NP   [facility]    [address]  [address]  [facility] [id number] [phone]            Aug 22, 2017 11:00 AM CDT   Return Visit with Austen Marquez MD   [facility] ([facility])    [address] [id number] [phone]            Sep 13, 2017   "3:00 PM CDT   (Arrive by 2:45 PM)   Return Botox with Frederick Bender MD   [facility] ([facility])    [address] [address] [phone]              MyChart Information     [employer] lets you send messages to your doctor, view your test results, renew your prescriptions, schedule appointments and more. To sign up, go to [email or web address] . Click on \"Log in\" on the left side of the screen, which will take you to the Welcome page. Then click on \"Sign up Now\" on the right side of the page.     You will be asked to enter the access code listed below, as well as some personal information. Please follow the directions to create your username and password.     Your access code is: [email or web address]  Expires: 2017  1:39 PM     Your access code will  in 90 days. If you need help or a new code, please call your [facility] clinic or [phone].        Care EveryWhere ID     This is your Care EveryWhere ID. This could be used by other organizations to access your [facility] medical records  [id number]        Equal Access to Services     NABIL GALEANO AH: Brandon Santiago, waluigida bryant, qaybta kaalmada adeonel, mike parsons. So [facility] [phone].    ATENCIÓN: Si habla español, tiene a livingston disposición servicios gratuitos de asistencia lingüística. Llame al [phone].    We comply with applicable federal civil rights laws and [place] laws. We do not discriminate on the basis of race, color, national origin, age, disability sex, sexual orientation or gender identity.            "

## 2017-06-28 ENCOUNTER — TELEPHONE (OUTPATIENT)
Dept: ADDICTION MEDICINE | Facility: CLINIC | Age: 22
End: 2017-06-28

## 2017-06-28 ASSESSMENT — ANXIETY QUESTIONNAIRES: GAD7 TOTAL SCORE: 15

## 2017-06-28 ASSESSMENT — PATIENT HEALTH QUESTIONNAIRE - PHQ9: SUM OF ALL RESPONSES TO PHQ QUESTIONS 1-9: 15

## 2017-06-28 NOTE — TELEPHONE ENCOUNTER
----- Message from Ban Aldana sent at 6/26/2017  9:16 AM CDT -----  Regarding: Addiction Med Referrals  Please review.

## 2017-06-28 NOTE — TELEPHONE ENCOUNTER
Writer attempted to contact pt no answer, left VM for pt to call clinic for appt.         Shaq Quintero

## 2017-07-03 ENCOUNTER — OFFICE VISIT (OUTPATIENT)
Dept: PALLIATIVE MEDICINE | Facility: CLINIC | Age: 22
End: 2017-07-03
Payer: COMMERCIAL

## 2017-07-03 DIAGNOSIS — G89.29 CHRONIC ABDOMINAL PAIN: ICD-10-CM

## 2017-07-03 DIAGNOSIS — R10.9 CHRONIC ABDOMINAL PAIN: ICD-10-CM

## 2017-07-03 DIAGNOSIS — G89.4 CHRONIC PAIN SYNDROME: Primary | ICD-10-CM

## 2017-07-03 DIAGNOSIS — Z87.39 HISTORY OF FIBROMYALGIA: ICD-10-CM

## 2017-07-03 PROCEDURE — 96150 HC HEALTH & BEHAVIOR ASSESS INITIAL, EA 15MIN: CPT | Performed by: PSYCHOLOGIST

## 2017-07-03 NOTE — MR AVS SNAPSHOT
After Visit Summary   7/3/2017    Samara Oropeza    MRN: [id number]           Patient Information     Date Of Birth          1995        Visit Information        Provider Department      7/3/2017 10:00 AM Kimo Hudson LP [facility]        Today's Diagnoses     Chronic pain syndrome    -  1    Chronic abdominal pain        History of fibromyalgia           Follow-ups after your visit        Your next 10 appointments already scheduled     Jul 11, 2017 10:30 AM CDT   Return Visit with ALISA Burt   [facility]    [address] [address]   [phone]            Jul 18, 2017  2:30 PM CDT   Return Visit with ALISA Burt   [facility]    [address] [id number] [phone]            Jul 20, 2017 11:00 AM CDT   Return Visit with Kimo Hudson LP   [facility] ([facility])    [address] [address] [address]   [phone]            Jul 27, 2017  9:15 AM CDT   New Visit with Emily Rollins, PT   [facility] ([facility])    [address] [place] [address]   [phone]            Aug 01, 2017  2:30 PM CDT   Return Visit with ALISA Burt   [facility]    [address] [address]   [phone]            Aug 08, 2017 10:30 AM CDT   Return Visit with ALISA Burt   [facility]    [address] [address]   [phone]            Aug 16, 2017  2:45 PM CDT   Return Visit with Cata Hurst NP   [facility] ([facility]    [address]  [address]  [facility] [address]   [phone]            Aug 22, 2017 11:00 AM CDT   Return Visit with Austen Marquez MD   [facility]    [address] [id number] [phone]            Sep 13, 2017  3:00 PM CDT   (Arrive by 2:45 PM)   Return Botox with Frederick Bender MD   [facility] ([facility])    [address] [facility]  3rd Floor  [facility] [address] [phone]              Who to contact     If you have questions or need follow up information about today's clinic visit or your schedule please contact [facility] directly at [phone].  Normal or non-critical lab and imaging results will be communicated to you by MyChart, letter or phone within 4 business days after the clinic has received the results. If you do not hear from us within 7 days, please contact the clinic through [employer] or phone. If you have a critical or abnormal lab result, we will notify you by phone as soon as possible.  Submit refill requests through [employer] or call your pharmacy and they will forward the refill request to us. Please allow 3 business days for your refill to be completed.          Additional Information About Your Visit        [email or web address] Information     HeadCount lets you send messages to your doctor, view your test results, renew your prescriptions, schedule appointments and more. To sign up, go to [email or web address] . Click on \"Log in\" on the left side of the screen, which will take you to the Welcome page. Then click on \"Sign up Now\" on the right side of the page.     You will be asked to enter the access code listed below, as well as some personal information. Please follow the directions to create your username and password.     Your access code is: [email or web address]  Expires: 7/23/2017  " 1:39 PM     Your access code will  in 90 days. If you need help or a new code, please call your [facility] clinic or [phone].        Care EveryWhere ID     This is your Care EveryWhere ID. This could be used by other organizations to access your [facility] medical records  [id number]        Your Vitals Were     Last Period                   2017            Blood Pressure from Last 3 Encounters:   17 100/60   17 126/78   17 111/72    Weight from Last 3 Encounters:   17 66.2 kg (146 lb)   17 66.2 kg (146 lb)   17 66.7 kg (147 lb)              We Performed the Following     HEALTH & BEHAVIOR ASSESS INITIAL, EA 15MIN        Primary Care Provider Office Phone # Fax #    EVI Cardona [facility] [phone]       [facility] [address] [facility] [address]  [facility] [address]        Equal Access to Services     NABIL GALEANO : Hadii aad ku hadasho Soomaali, waaxda luqadaha, qaybta kaalmada adeegyada, waxay idiin hayaan maddie kharachante rodriguez . So [facility] [phone].    ATENCIÓN: Si habla español, tiene a livingston disposición servicios gratuitos de asistencia lingüística. Llame al [phone].    We comply with applicable federal civil rights laws and [place] laws. We do not discriminate on the basis of race, color, national origin, age, disability sex, sexual orientation or gender identity.            Thank you!     Thank you for choosing [facility]  for your care. Our goal is always to provide you with excellent care. Hearing back from our patients is one way we can continue to improve our services. Please take a few minutes to complete the written survey that you may receive in the mail after your visit with us. Thank you!             Your Updated Medication List - Protect others around you: Learn how to safely use, store and throw away your medicines at www.disposemymeds.org.          This list is accurate as of: 7/3/17 12:11 PM.   Always use your most recent med list.                   Brand Name Dispense Instructions for use Diagnosis    albuterol 108 (90 BASE) MCG/ACT Inhaler    PROAIR HFA/PROVENTIL HFA/VENTOLIN HFA    1 Inhaler    Inhale 2 puffs into the lungs every 6 hours as needed for shortness of breath / dyspnea or wheezing    Atypical chest pain       amitriptyline 25 MG tablet    ELAVIL    45 tablet    Take 1 tablet (25 mg) by mouth At Bedtime    Atypical chest pain, Insomnia, unspecified type       apremilast 30 MG tablet    OTEZLA    60 tablet    20 mg in AM and 30mg in PM daily X 2 weeks and then 30mg twice daily.    Behcet's disease (H)       ASPIRIN CHILDRENS 81 MG chewable tablet   Generic drug:  aspirin      Take 81 mg by mouth daily        BENADRYL 25 MG tablet   Generic drug:  diphenhydrAMINE      Take 25 mg by mouth nightly as needed for itching or allergies        benzonatate 100 MG capsule    TESSALON    42 capsule    Take 1-2 pills THREE TIMES PER DAY as needed for cough    Acute bronchitis with symptoms > 10 days       betamethasone valerate 0.1 % cream    VALISONE     Apply topically 2 times daily        * botulinum toxin type A 100 UNITS injection    BOTOX    200 Units    Inject 200 Units into the muscle every 3 months    Cervical dystonia       * BOTOX IJ      Inject 150 Units into the muscle once Lot: /C3 Exp: 05/2019        * BOTOX IJ      Inject 150 Units into the muscle Lot # /C3  Exp: 8/2019        * BOTOX IJ      Inject 162.5 Units as directed once Lot #: /C3 Exp: 10/2019        * ONABOTULINUMTOXINA IJ      Inject 165 Units as directed once Lot # /C3 Expiration Date: 01/2020        busPIRone 10 MG tablet    BUSPAR    60 tablet    Start 1/2 tablet by mouth two times per day for one week, then increase to 1 tablet by mouth two times per day. For anxiety.    TAHIR (generalized anxiety disorder)       carbamide peroxide 6.5 % otic solution    DEBROX     5 drops 2 times daily        clobetasol  0.05 % cream    TEMOVATE    60 g    Apply topically 2 times daily    Folliculitis       Colchicine 0.6 MG Caps     90 capsule    Take 0.6 mg by mouth See Admin Instructions 2 capsules in AM and 1 capsule in PM    Behcet's syndrome (H)       dexamethasone 4 MG/ML injection    DECADRON    30 mL    Apply 1 mL (4 mg) topically as needed    Sprain of other ligament of left ankle, initial encounter       diclofenac 1 % Gel topical gel    VOLTAREN    100 g    Apply 2-4 grams to affected area(s) up to 4 times per day as needed. This is an anti-inflammatory medication.    Polyarthralgia       dicyclomine 20 MG tablet    BENTYL    60 tablet    Take 1 tablet (20 mg) by mouth 4 times daily as needed    Abdominal cramping       doxycycline 100 MG capsule    VIBRAMYCIN    20 capsule    Take 1 capsule (100 mg) by mouth 2 times daily    Acute bronchitis with symptoms > 10 days       EPINEPHrine 0.3 MG/0.3ML injection    EPIPEN 2-EAMON    0.6 mL    Inject 0.3 mLs (0.3 mg) into the muscle as needed for anaphylaxis    Hx of bee sting allergy       EXCEDRIN MIGRAINE PO      Take by mouth as needed        guanFACINE 1 MG tablet    TENEX    180 tablet    Take 3 tablets (3 mg) by mouth twice daily in the morning and evening daily.    Tic       hyoscyamine 0.125 MG tablet    ANASPAZ/LEVSIN    40 tablet    Take 1-2 tablets (125-250 mcg) by mouth every 4 hours as needed for cramping    Abdominal pain, generalized       hypromellose 0.3 % Soln ophthalmic solution    GENTEAL     1 drop every hour as needed        LACTAID PO      Take by mouth daily        lactulose 20 GM/30ML Soln     300 mL    Take 30 mLs by mouth 3 times daily as needed        lidocaine 2 % topical gel    XYLOCAINE     Apply 1 Tube topically daily Reported on 4/21/2017        lidocaine visc 2% 2.5mL/5mL & maalox/mylanta w/ simeth 2.5mL/5mL & diphenhydrAMINE 5mg/5mL Susp suspension    [facility]    1 Bottle    Swish and swallow 10 mLs in mouth every 6 hours as  needed for mouth sores    Behcet's syndrome (H)       linaclotide 290 MCG capsule    LINZESS    30 capsule    Take 1 capsule (290 mcg) by mouth every morning (before breakfast)    Irritable bowel syndrome       LORazepam 1 MG tablet    ATIVAN    90 tablet    Take 0.5 tablets (0.5 mg) by mouth 2 times daily as needed for other (abdominal pain) Do not operate a vehicle after taking this medication    Chronic abdominal pain       meclizine 25 MG tablet    ANTIVERT    30 tablet    Take 1 tablet (25 mg) by mouth every 6 hours as needed for dizziness    Nausea       metaxalone 800 MG tablet    SKELAXIN    30 tablet    Take 0.5 tablets (400 mg) by mouth 3 times daily as needed for moderate pain    Sprain of neck, initial encounter, Cervical dystonia       norgestimate-ethinyl estradiol 0.25-35 MG-MCG per tablet    ORTHO-CYCLEN, SPRINTEC    84 tablet    Take 1 tablet by mouth daily    General counseling for prescription of oral contraceptives       omeprazole 40 MG capsule    priLOSEC    90 capsule    Take 1 capsule (40 mg) by mouth daily    Gastroesophageal reflux disease, esophagitis presence not specified       ondansetron 8 MG ODT tab    ZOFRAN-ODT    60 tablet    Take 1 tablet (8 mg) by mouth every 8 hours as needed for nausea    Non-intractable vomiting with nausea, unspecified vomiting type, Hematemesis, presence of nausea not specified       promethazine 25 MG tablet    PHENERGAN    20 tablet    Take 0.5-1 tablets (12.5-25 mg) by mouth every 6 hours as needed for nausea    Intractable chronic migraine without aura and without status migrainosus       RANITIDINE 75 PO      Take  by mouth. As needed for GI upset        sucralfate 1 GM/10ML suspension    CARAFATE    1200 mL    Take 10 mLs (1 g) by mouth 4 times daily    Bile reflux gastritis, Nausea       SUMAtriptan 100 MG tablet    IMITREX    18 tablet    Take 1 tablet (100 mg) by mouth at onset of headache for migraine May repeat in 2 hours. Max 2 tablets/24 hours.     Intractable chronic migraine without aura and without status migrainosus       triamcinolone 0.1 % cream    KENALOG    15 g    Apply sparingly to oral ulcers three times daily for 14 days as needed.    Behcet's syndrome (H)       * Notice:  This list has 5 medication(s) that are the same as other medications prescribed for you. Read the directions carefully, and ask your doctor or other care provider to review them with you.

## 2017-07-03 NOTE — TELEPHONE ENCOUNTER
Writer called, and spoke with pt's mother. Per pt's mother, pt is not interested in being seen at  for addiction medicine. Writer is closing this encounter, no further action needed.     Lindsay Franks

## 2017-07-03 NOTE — PROGRESS NOTES
"                                                                                                                                                                                                                              [facility]     BEHAVIORAL DIAGNOSTIC ASSESSMENT      IDENTIFYING INFORMATION:IDENTIFYING INFORMATION: The patient is a 21 year old year old, single, ,  Individual, who was seen today for a behavioral assessment as part of a dual evaluation process at the [facility].              CONSULTING PAIN PHYSICIAN: Dr Natalie Cha MD       REFERRAL SOURCE: Sonja STEVE CNP       PRESENTING CONCERNS OF REFERRING PROVIDER: Chronic Pain, Multiple sources     GOES BY: shannan     PRESENTING CONCERNS OF PATIENT: Abdomen, upper torso    CURRENT PAIN SYMPTOMS:  Please see Dr. Natalie Rehman MD notes for more details of her pain symptoms.       DURATION: Varioustypes of pain for various lengths of time       DIAGNOSES: Chronic pain syndrome, chronic abdominal pain, history of fibromyalgia       PRECIPITATING EVENT: None       LOCATION:  Multiple locations       PAIN PATTERN: Constant        PAIN PROGRESSION: Builds with the day       PAIN LEVEL (1-10 scale, 10=severe): Current: 6.  Range: 5 to 10.             PAIN QUALITY: \"pain\", cramping and nauseau, bloating          PREVIOUS TREATMENT AT A PAIN CLINIC: No      OTHER PAST MEDICAL TREATMENTS AND OUTCOMES: Review medical record      PT'S BELIEFS ABOUT THE CAUSE OF PAIN: Multiple causes      WHAT PROVIDERS DISCUSSED AS CAUSE: Review medical record      CURRENT MEDICATIONS:    Current Outpatient Prescriptions   Medication Sig Dispense Refill     diphenhydrAMINE (BENADRYL) 25 MG tablet Take 25 mg by mouth nightly as needed for itching or allergies       busPIRone (BUSPAR) 10 MG tablet Start 1/2 tablet by mouth two times per day for one week, then increase to 1 tablet by mouth two times per day. For anxiety. 60 tablet 1 "     LORazepam (ATIVAN) 1 MG tablet Take 0.5 tablets (0.5 mg) by mouth 2 times daily as needed for other (abdominal pain) Do not operate a vehicle after taking this medication 90 tablet 0     ONABOTULINUMTOXINA IJ Inject 165 Units as directed once Lot # /C3  Expiration Date: 01/2020       guanFACINE (TENEX) 1 MG tablet Take 3 tablets (3 mg) by mouth twice daily in the morning and evening daily. 180 tablet 3     lactulose 20 GM/30ML SOLN Take 30 mLs by mouth 3 times daily as needed 300 mL 0     sucralfate (CARAFATE) 1 GM/10ML suspension Take 10 mLs (1 g) by mouth 4 times daily 1200 mL 2     diclofenac (VOLTAREN) 1 % GEL topical gel Apply 2-4 grams to affected area(s) up to 4 times per day as needed. This is an anti-inflammatory medication. 100 g 4     benzonatate (TESSALON) 100 MG capsule Take 1-2 pills THREE TIMES PER DAY as needed for cough 42 capsule 0     doxycycline (VIBRAMYCIN) 100 MG capsule Take 1 capsule (100 mg) by mouth 2 times daily 20 capsule 0     metaxalone (SKELAXIN) 800 MG tablet Take 0.5 tablets (400 mg) by mouth 3 times daily as needed for moderate pain 30 tablet 1     ondansetron (ZOFRAN-ODT) 8 MG ODT tab Take 1 tablet (8 mg) by mouth every 8 hours as needed for nausea 60 tablet 11     aspirin (ASPIRIN CHILDRENS) 81 MG chewable tablet Take 81 mg by mouth daily       dicyclomine (BENTYL) 20 MG tablet Take 1 tablet (20 mg) by mouth 4 times daily as needed 60 tablet 1     amitriptyline (ELAVIL) 25 MG tablet Take 1 tablet (25 mg) by mouth At Bedtime (Patient taking differently: Take 25 mg by mouth At Bedtime ) 45 tablet 5     omeprazole (PRILOSEC) 40 MG capsule Take 1 capsule (40 mg) by mouth daily 90 capsule 3     EPINEPHrine (EPIPEN 2-EAMON) 0.3 MG/0.3ML injection Inject 0.3 mLs (0.3 mg) into the muscle as needed for anaphylaxis 0.6 mL 3     apremilast (OTEZLA) 30 MG tablet 20 mg in AM and 30mg in PM daily X 2 weeks and then 30mg twice daily. 60 tablet 3     Colchicine 0.6 MG CAPS Take 0.6 mg by  mouth See Admin Instructions 2 capsules in AM and 1 capsule in PM 90 capsule 3     dexamethasone (DECADRON) 4 MG/ML injection Apply 1 mL (4 mg) topically as needed 30 mL 0     OnabotulinumtoxinA (BOTOX IJ) Inject 162.5 Units as directed once Lot #: /C3  Exp: 10/2019       norgestimate-ethinyl estradiol (ORTHO-CYCLEN, SPRINTEC) 0.25-35 MG-MCG per tablet Take 1 tablet by mouth daily 84 tablet 3     albuterol (PROAIR HFA/PROVENTIL HFA/VENTOLIN HFA) 108 (90 BASE) MCG/ACT Inhaler Inhale 2 puffs into the lungs every 6 hours as needed for shortness of breath / dyspnea or wheezing 1 Inhaler 1     OnabotulinumtoxinA (BOTOX IJ) Inject 150 Units into the muscle Lot # /C3  Exp: 8/2019       SUMAtriptan (IMITREX) 100 MG tablet Take 1 tablet (100 mg) by mouth at onset of headache for migraine May repeat in 2 hours. Max 2 tablets/24 hours. 18 tablet 3     promethazine (PHENERGAN) 25 MG tablet Take 0.5-1 tablets (12.5-25 mg) by mouth every 6 hours as needed for nausea 20 tablet 1     meclizine (ANTIVERT) 25 MG tablet Take 1 tablet (25 mg) by mouth every 6 hours as needed for dizziness 30 tablet 1     Magic Mouthwash (FV std formula) lidocaine visc 2% 2.5mL/5mL & maalox/mylanta w/ simeth 2.5mL/5mL & diphenhydrAMINE 5mg/5mL Swish and swallow 10 mLs in mouth every 6 hours as needed for mouth sores 1 Bottle 1     clobetasol (TEMOVATE) 0.05 % cream Apply topically 2 times daily 60 g 0     OnabotulinumtoxinA (BOTOX IJ) Inject 150 Units into the muscle once Lot: /C3 Exp: 05/2019       botulinum toxin type A (BOTOX) 100 UNITS injection Inject 200 Units into the muscle every 3 months 200 Units 3     linaclotide (LINZESS) 290 MCG capsule Take 1 capsule (290 mcg) by mouth every morning (before breakfast) 30 capsule 1     hyoscyamine (ANASPAZ,LEVSIN) 0.125 MG tablet Take 1-2 tablets (125-250 mcg) by mouth every 4 hours as needed for cramping 40 tablet 1     Aspirin-Acetaminophen-Caffeine (EXCEDRIN MIGRAINE PO) Take by mouth as  needed        hypromellose (GENTEAL) 0.3 % SOLN 1 drop every hour as needed       betamethasone valerate (VALISONE) 0.1 % cream Apply topically 2 times daily       carbamide peroxide (DEBROX) 6.5 % otic solution 5 drops 2 times daily       Lactase (LACTAID PO) Take by mouth daily       lidocaine (XYLOCAINE) 2 % jelly Apply 1 Tube topically daily Reported on 4/21/2017       triamcinolone (KENALOG) 0.1 % cream Apply sparingly to oral ulcers three times daily for 14 days as needed. 15 g 1     Ranitidine HCl (RANITIDINE 75 PO) Take  by mouth. As needed for GI upset     .      ATTITUDES TOWARDS USING OPIOIDS: N/A    CONTRIBUTING COMPONENTS TO PAIN:       EMOTIONAL DISTRESS FROM PAIN: High        LEVEL OF SELF-MANAGEMENT: Moderate, more passive than not      METHODS OF COPING / MANAGEMENT: Fair      VIGILANCE TO PAIN: High.       LEVEL OF TENSION: High       GUARDING RESPONSE: High      HEADACHES: Yes reports migraines      BRUXISM: No      ABILITY TO RELAX: Poor      ABILITY TO PACE ACTIVITY: Unknown      OBEYS LIMITATIONS: Unknown    PSYCHOLOGY SYMPTOMS:     DEPRESSION: Yes, reported several symptoms consistent with depressive disorders        Symptom List: Trouble falling or staying asleep and feeling tired or having little energy and poor appetite, trouble concentrating, moving or speaking slowly. Of the endorsed items it is important to note several present with both depression and pain.       Total PHQ-9 = 14 (5-9 mild, 10-14 mod, 15-19 mod sev, >20 Sev). Note: The full PHQ-9 has been scanned - see media tab.      ANXIETY: Yes, reported several symptoms consistent with anxiety disorders        Symptom List : Feeling nervous or on edge, not being able to control worry, worrying too much about different things, trouble relaxing, feeling restless, easily annoyed and irritable, feeling afraid as a something awful might happen.       Total TAHIR-7= 16{ (5-9 mild, 10-14 mod, 15-21 severe,)         Note the full TAHIR-7 has  "been scanned-see media tab    SLEEP PROBLEMS:   \"I have trouble falling asleep and staying asleep and waking up frequently. I have nightmares on a daily basis\". Patient reports her difficulty with sleep is long-standing and she estimates that she sleeps 4-6 hours per night. She states she does not nap during the daytime. She complains of fatigue though she is quite physically active.    ANGER / IRRITABILITY:  Reports her irritability level is high          Resentment or blame regarding cause of condition or treatment:  It is very frustrated with her medical condition as many providers who tell her many different things about steps she should take to address separate health problems. Does not believe they tend to view her as a whole person.               Resentment regarding other major life issues: Resents pain and how it is limited her functioning            PROBLEMS AND IMPAIRMENTS DUE TO PAIN:        Walking   none       Standing:   none      Sitting: None      Family: Relationships  okay      Occupation    okay      Personal:  frustration of life limitations      Overall Quality of Life   poor to fair    Note: The PDQ (Pain Disability Questionnaire, Oswestry Disability Questionnaire) has been completed and scanned (see Media tab).    STRESS LEVEL:  High   Sources of stress:  pain, unable to do developmentally appropriate young adult tasks with any level of frequency                 STRENGTHS INCLUDE:   bright, willing.        .    MENTAL HEALTH HISTORY:             Depression or Anxiety:   yes      ADD:    No        OCD:   no       Bipolar Disorder  no         Schizophrenia:   no               Past Mental Health Treatment: :  yes      Current psychotropic medications: Yes      Currently seeing psychiatrist or therapist:  yes    MINI MENTAL STATUS EXAM   Assessment: Current Emotional / Mental Status    Appearance:   Appropriate   Eye Contact:   Fair   Psychomotor Behavior: Normal   Attitude:   Cooperative "   Orientation:   All  Speech:  Rate / Production:            Normal   Volume:                                Normal   Mood:    Anxious  Depressed  Sad  Mild anger   Affect:    Blunted  Flat  Restricted   Thought Content:  Clear   Thought Form:  Coherent  Logical   Insight:   Fair         HEALTH BEHAVIORS:        Alcohol Use   occasional alcohol consumption           Recreational drugs:   denies        Tobacco Use:  denies      Caffeine Use  reports she will drink one caffeinated beverage 2-4 times per week        CAGE QUESTIONNAIRE:             1. Have you ever felt you should cut down on your drinking?  No            2. Have people annoyed you by criticizing your drinking? No            3. Have you ever felt bad or guilty about your drinking? No            4. Have you ever had a drink first thing in the morning to steady your nerves or               get rid of a hangover (eye-opener)? No        Total Score: 0          Previous problems/treatment for substance abuse:                 Alcohol: No   Illegal Drugs: No          Prescription Drugs: No          Other addictive behaviors: No      CURRENT MEDICAL CONCERNS: Review medical record        Past medical problems:   Past Medical History:   Diagnosis Date     Anxiety      Arthritis      Behcet's disease (H)      Cervical adenitis May 2010     Chronic abdominal pain      Constipation, chronic 1994     Gastro-oesophageal reflux disease      Gastroparesis      Migraines      Neuromuscular disorder (H)     fibramyalgia     Palpitations      Seizure (H)      Seizures (H)     unknown etiology     Syncope      Tourette's            History of Head Trauma: None        Evidence of Cognitive Impairment: None     OCCUPATIONAL AND EDUCATIONAL HISTORY:       Current Employment: Works part-time        Job Satisfaction:  Minimal           Education Level: Some college        History:  None       Disability status: Not applicable       Legal case pending: No  SOCIAL  "HISTORY:       Relationship Status: Coupled       Relationship Satisfaction: Good       Length of time with spouse/partner: 5 years         Current living situation: Lives with partner       Children: No       Social Support: Minimal    FAMILY HISTORY:       Family Status: The patient was raised in [place]       Mother: Living: Yes Quality: Questionable       Father: Living: Yes Quality questionable        Siblings: Deferred  Quality deferred       Family History of Behavioral Health Problems: Yes  Who: Mother, father, maternal grandmother, maternal grandfather       Family History of Substance Abuse: Yes Who: Father, maternal grandfather       General Upbringing and Family Relationships: Deferred       History of Abuse/Trauma/Neglect: Denies When NA  Type  NA           PATIENT'S GOALS:    FEELINGS ABOUT PARTICIPATION IN A COMPREHENSIVE PAIN PROGRAM: Ambivalent                  O: The patient participated in a health and behavioral evaluation (billed 78551).  The limits of confidentiality were detailed.     ASSESSMENT:            SUMMARY AND IMPRESSIONS:                 1.  PAIN:  Samara Oropeza is seen on this date for an elective pain psychology assessment. She is referred by Dr. Natalie Cha MD. She is referred to pain services by her primary care provider Sonja STEVE CNPJuan A Pascual reports a complex pain history dating back to age 6. Her primary pain area complaints are 1) her abdomen, particularly IBS related symptoms. 2) \"my upper torso\" by which she appears to be referencing her low back shoulder neck region and upper neck. From review of the record the latter concern area has been only minimally assessed. The patient has many diagnoses including rheumatoid arthritis, Behcet's syndrome, irritable bowel syndrome, migraine headaches, fibromyalgia, knee pain, and low back pain.    It appears that Svetlana has had to give up on many typical aspects of being a 22-year-old individual. For example she " "was enrolled in college but had to do a medical withdrawal within 2-3 months after starting. This occurred 3 years ago. Since that time she has had to give up many other similar or related activities that fit for her well mental stage of life. She is able to do all necessary physical functions for daily living. She is however currently under a number of different types of medical restriction that prohibit her from for example re-enrolling in college.                     2.  MENTAL HEALTH: Currently sees a provider Tony CUELLAR. Her diagnosis is Major Depression Moderate. At present she appears to be taking buspirone, and amitriptyline for medication management. To date Samara denies any concerns with self harm or suicidal ideation. She denies any prior attempts, intent, or plan. She does wonder sometimes \"if I were to be put into a coma what I feel\". She reports what she means by this is that she hopes that she can go to sleep and wake up healthy. She reports that there is considerable mental health problems in her biological extended family including her mother, her maternal grandmother, her maternal grandfather. She reports that her maternal grandfather made a suicide attempt about one year ago and has had made 2 prior attempts several years in between. She notes that she has a cousin who committed suicide approximately 2 years ago via gunshot.                 3.  SLEEP: See above                 4.  SUBSTANCES: Patient reports that she rarely uses alcohol. She does not use tobacco she denies using any drugs. She will consume caffeinated drinks a few times per week. She denies ever abusing any of her pain related or anxiety based medications. There is a considerable history of addiction issues in her biological family including her father, maternal grandmother, paternal grandfather, paternal grandmother, paternal grandfather, the patient herself has no history of any treatment. She denies any history " of past misuse or abuse of substances in adolescent or young adulthood. Patient had a very negative encounter with a [facility] provider last week where she was referred to addiction medicine because of her presentation with that provider. She was requesting to have her benzodiazepines refilled and the provider reportedly informed her that she believes she was in withdrawal and to be drug seeking. Patient was angered and offended by this and filed a complaint with the clinic management. The patient's stepfather is present today and reports there is no history of concerned about any substance use whatsoever with this patient at any point in the 15 years he has lived in the home with her.                 5.  PSYCHOSOCIAL:  Patient lives with her boyfriend of 5 years Rocky. She has no children. The patient was enrolled in college for 3 months in 2014. She had to withdraw medically after having multiple complications with her irritable bowel and Behcet's syndrome. The patient employed is employed as a primary care attendant. Until recently she was working 15 hours per week however now she is working 6 hours per week as the child she cares for is not in need. Patient reports that she has one friend whom she has known for many years and considers to be her best friend. She notes she had another good friend who recently moved out of state. She denies involvement in community based activities such as Christian or other organizations. The patient reports that when in high school she was very active in several areas that she no longer is including writing, music, and athletics. At present the patient does report that she does active Pilates and yoga one and a half to 2 hours daily. The patient admits to having regular fears that her social plans will be interrupted by features of her pain conditions.            MOTIVATIONAL LEVEL FOR TREATMENT: Unclear          APPROPRIATENESS FOR PAIN MANAGEMENT PROGRAM: Unclear          PROGNOSIS: Difficult to evaluate  The patient is pleasant and cooperative. She has many medical problems and a lengthy history of involvement with many different specialties within medicine. Currently her specialists include rheumatology, gastroenterology, psychiatry. She is willing to participate in pain services. Given her involvement with other providers leads to my questioning whether or not she is appropriate for our program services. She has agreed to return in one additional time so we could discuss what treatment may look like. She is interested in pursuing pain physical therapy as discussed in her most recent visit with her pain provider. She will reschedule to see me and established a new appointment with a pain physical therapist today.      DSM5 Diagnoses: (Sustained by DSM5 Criteria Listed Above)  Diagnoses:  Major Depression Recurrent-Moderate by self report; Anxiety Disorder NOS by self report    PLAN:  The importance of pain treatment planning was briefly discussed with the patient. Pt agrees to return for follow up appointment to discuss possible treatment interventions including cognitive, behavioral, body based and emotional strategies to more effectively cope with pain                    Recommend Psychological Therapy:  Yes    FREQUENCY: Every other week       Recommend [facility]   no      Time spent with Patient: One hour in direct patient contact for a psychological  pain evaluation.        Kimo Dooley MA, LP, ELICIAC ...............  July 3, 2017  Behavioral Pain Specialist

## 2017-07-05 NOTE — TELEPHONE ENCOUNTER
Patient had appointment with psychiatry on 6/27 and pain management on 7/3.  Ativan use was not addressed by psychiatry NP or pain management and was referred back to PCP.  Alo was added.  Ativan use continues to be a concern due to apparent escalated dose and probable withdraw symptoms.  Further refills can only happen after consultation with addiction medicine to review efficacy, possible physical dependence and safety of the medication.  LEIGHTON Teran

## 2017-07-10 ENCOUNTER — CARE COORDINATION (OUTPATIENT)
Dept: CARE COORDINATION | Facility: CLINIC | Age: 22
End: 2017-07-10

## 2017-07-10 NOTE — PROGRESS NOTES
Clinic Care Coordination Contact  [facility]/Voicemail    Referral Source: PCP  Clinical Data: Care Coordinator Outreach  Outreach attempted x 1.  Left message on voicemail with call back information and requested return call.  Plan: Care Coordinator will try to reach patient again in 3-5 business days.  ADRIANA Ravi    Care Coordinator  [facility], [facility] and [facility]   [phone]

## 2017-07-11 ENCOUNTER — TELEPHONE (OUTPATIENT)
Dept: FAMILY MEDICINE | Facility: CLINIC | Age: 22
End: 2017-07-11

## 2017-07-11 ENCOUNTER — OFFICE VISIT (OUTPATIENT)
Dept: BEHAVIORAL HEALTH | Facility: CLINIC | Age: 22
End: 2017-07-11
Payer: COMMERCIAL

## 2017-07-11 DIAGNOSIS — F33.1 MODERATE EPISODE OF RECURRENT MAJOR DEPRESSIVE DISORDER (H): Primary | ICD-10-CM

## 2017-07-11 DIAGNOSIS — M35.2 BEHCET'S DISEASE (H): ICD-10-CM

## 2017-07-11 DIAGNOSIS — F41.1 GAD (GENERALIZED ANXIETY DISORDER): ICD-10-CM

## 2017-07-11 DIAGNOSIS — F43.10 PTSD (POST-TRAUMATIC STRESS DISORDER): ICD-10-CM

## 2017-07-11 PROCEDURE — 90834 PSYTX W PT 45 MINUTES: CPT | Performed by: SOCIAL WORKER

## 2017-07-11 ASSESSMENT — ANXIETY QUESTIONNAIRES
IF YOU CHECKED OFF ANY PROBLEMS ON THIS QUESTIONNAIRE, HOW DIFFICULT HAVE THESE PROBLEMS MADE IT FOR YOU TO DO YOUR WORK, TAKE CARE OF THINGS AT HOME, OR GET ALONG WITH OTHER PEOPLE: VERY DIFFICULT
7. FEELING AFRAID AS IF SOMETHING AWFUL MIGHT HAPPEN: NEARLY EVERY DAY
2. NOT BEING ABLE TO STOP OR CONTROL WORRYING: NEARLY EVERY DAY
GAD7 TOTAL SCORE: 19
3. WORRYING TOO MUCH ABOUT DIFFERENT THINGS: MORE THAN HALF THE DAYS
1. FEELING NERVOUS, ANXIOUS, OR ON EDGE: NEARLY EVERY DAY
5. BEING SO RESTLESS THAT IT IS HARD TO SIT STILL: NEARLY EVERY DAY
6. BECOMING EASILY ANNOYED OR IRRITABLE: MORE THAN HALF THE DAYS

## 2017-07-11 ASSESSMENT — PATIENT HEALTH QUESTIONNAIRE - PHQ9: 5. POOR APPETITE OR OVEREATING: NEARLY EVERY DAY

## 2017-07-11 NOTE — MR AVS SNAPSHOT
MRN:[id number]                      After Visit Summary   7/11/2017    Samara Oropeza    MRN: [id number]           Visit Information        Provider Department      7/11/2017 10:30 AM Layla Browne LICSW [facility]      Your next 10 appointments already scheduled     Jul 18, 2017  2:30 PM CDT   Return Visit with ALISA Burt   [facility] ([facility])    [address] [address]   [phone]            Jul 20, 2017 11:00 AM CDT   Return Visit with Kimo Hudson LP   [facility] ([facility])    [address] [place] [id number] [phone]            Jul 27, 2017  9:15 AM CDT   New Visit with Emily Rollins PT   [facility] ([facility])    [address] [place] [id number] [phone]            Aug 01, 2017  2:30 PM CDT   Return Visit with ALISA Burt   [facility] ([facility])    [address] [id number] [phone]            Aug 08, 2017 10:30 AM CDT   Return Visit with ALISA Burt   [facility] ([facility])    [address] [id number] [phone]            Aug 16, 2017  2:45 PM CDT   [facility] Visit with Cata Hurst NP   [facility]    [address]  [address]  [facility] [id number] [phone]            Aug 22, 2017 11:00 AM CDT   Return Visit with Austen Marquez MD   [facility] ([facility]    [address] [id number] [phone]            Sep 13, 2017  3:00 PM CDT   (Arrive by  "2:45 PM)   Return Botox with Frederick Bender MD   [facility] ([facility])    [address] [address] [phone]              MyChart Information     Information Gateway[facility] lets you send messages to your doctor, view your test results, renew your prescriptions, schedule appointments and more. To sign up, go to [email or web address] . Click on \"Log in\" on the left side of the screen, which will take you to the Welcome page. Then click on \"Sign up Now\" on the right side of the page.     You will be asked to enter the access code listed below, as well as some personal information. Please follow the directions to create your username and password.     Your access code is: [email or web address]  Expires: 2017  1:39 PM     Your access code will  in 90 days. If you need help or a new code, please call your [place] clinic or [phone].        Care EveryWhere ID     This is your Care EveryWhere ID. This could be used by other organizations to access your [place] medical records  [id number]        Equal Access to Services     NABIL GALEANO AH: Brandon Santiago, channing hurtado, clover juan, mike parsons. So [facility] [phone].    ATENCIÓN: Si habla español, tiene a living[place] disposición servicios gratuitos de asistencia lingüística. Llame al [phone].    We comply with applicable federal civil rights laws and [place] laws. We do not discriminate on the basis of race, color, national origin, age, disability sex, sexual orientation or gender identity.            "

## 2017-07-11 NOTE — PROGRESS NOTES
Progress Note    Client Name: Samara Oropeza  Date: 7/11/2017             Service Type: Individual      Session Start Time: 10:35  Session End Time: 11:30      Session Length: 45     Session #: 12     Attendees: Client attended alone    Treatment Plan Last Reviewed: 6/27/2017    PHQ-9 / TAHIR-7 : 6/6/2017       DATA      Progress Since Last Session (Related to Symptoms / Goals / Homework):   Symptoms: Worsening    Homework: Partially completed- just got her medications (concerned about allergic effects)      Episode of Care Goals: Satisfactory progress - ACTION (Actively working towards change); Intervened by reinforcing change plan / affirming steps taken     Current / Ongoing Stressors and Concerns:  Client states that decision has been made about moving- they will be moving into a new place by the end of September. Stressful at her part-time job- the boy that she cares for as not been doing well- stressful with his behaviors.  She has also been witness to fighting between the parents. Encouraged client to look at a) what she has control over and b) what is more immediate- higher anxiety related to her thinking that she has to fix everything and is responsible for everything.  Encouraged her to decide what is important and establish goals for just this week- to break down the tasks and then utilize her skills to manage those events.     Treatment Objective(s) Addressed in This Session:   use cognitive strategies identified in therapy to challenge anxious thoughts  Increase restful sleep     Intervention:   CBT: breaking down tasks and setting up realistic goals        ASSESSMENT: Current Emotional / Mental Status (status of significant symptoms):   Risk status (Self / Other harm or suicidal ideation)   Client denies current fears or concerns for personal safety.   Client denies current or recent suicidal ideation or behaviors. Had one day when passive SI  "thought-shared with her family \"i want to go and be with my dog\"    Client denies current or recent homicidal ideation or behaviors.   Client denies current or recent self injurious behavior or ideation.   Client denies other safety concerns.   A safety and risk management plan has not been developed at this time, however client was given the after-hours number / 911 should there be a change in any of these risk factors.     Appearance:   Appropriate    Eye Contact:   Fair    Psychomotor Behavior: Normal    Attitude:   Cooperative    Orientation:   All   Speech    Rate / Production: Monotone     Volume:  Soft    Mood:    Anxious  Depressed    Affect:    Flat  Worrisome  teary at times    Thought Content:  Clear    Thought Form:  Coherent  Logical    Insight:    Fair      Medication Review:   No changes to current psychiatric medication(s)     Medication Compliance:   Yes encouraged to start medications     Changes in Health Issues:   Yes: Pain, Associated Psychological Distress- started working with pain management     Chemical Use Review:   Substance Use: Chemical use reviewed, no active concerns identified      Tobacco Use: No current tobacco use.       Collateral Reports Completed:   Not Applicable    PLAN: (Client Tasks / Therapist Tasks / Other)  Homework:      1.  Start new medication  2. Build awareness and use skills on grief process.  3.  Identify and take smaller more manageable steps towards goals of moving and finances.      Layla Browne, [facility]                                                         ________________________________________________________________________    Treatment Plan    Client's Name: Samara Oropeza  YOB: 1995    Date: 3/14/2017     DSM-V Diagnoses: 296.32 Major Depressive Disorder, Recurrent Episode, Moderate _ or 300.02 (F41.1) Generalized Anxiety Disorder  Psychosocial / Contextual Factors: Moderate- Minimal contact with family, Health " issues  WHODAS: 30    Referral / Collaboration:  Referral to another professional/service is not indicated at this time..    Anticipated number of session or this episode of care: 12      MeasurableTreatment Goal(s) related to diagnosis / functional impairment(s)  Goal 1: Client will decrease anxiety symptoms as eveidenced by self-report and TAHIR-7 scores, currently at 14, goal to 7 or below    I will know I've met my goal when I am better able to deal with it.      Objective #A (Client Action)    Client will use relaxation strategies 3x times per day to reduce the physical symptoms of anxiety.  Status: Continued - Date(s): 6/27/2017      Intervention(s)  Therapist will teach different relaxation strategies and have client complete one between session.    Objective #B  Client will use cognitive strategies identified in therapy to challenge anxious thoughts.  Status: Continued - Date(s): 6/27/2017      Intervention(s)  Therapist will 1.  introduce cognitive distortions; 2. build awareness for client; 3. leanr an implement diffferent cognitive restructuring techniques.    Objective #C  Client will increase her amount of restful sleep.  Status: Continued - Date(s): 6/27/2017      Intervention(s)  Therapist will 1. start a sleep journal; 2. Introduce techniques for falling and staying asleep.        Client has reviewed and agreed to the above plan.      Layla Browne, [facility]  March 14, 2017

## 2017-07-11 NOTE — TELEPHONE ENCOUNTER
Case reviewed with Cara Carter who is seeing the patient while Sonja Abreu is on maternity leave, Concerns brought to patient rep by mother, unable to get a hold of patient for comment.  Mother has declined Cara's referral to Addiction medicine for her daughter, and was not happy with the referral.  Discussed plan moving forward, should patient decide to continue care with [facility].   Cara will consult with Addiction Medicine about a taper off of Ativan as it is not an appropriate med for her dx.  In addition we will have a C present at the next visit to support patient through the taper off of Ativan.    Carmen Jefferson P  MEDICAL LEAD

## 2017-07-12 ASSESSMENT — PATIENT HEALTH QUESTIONNAIRE - PHQ9: SUM OF ALL RESPONSES TO PHQ QUESTIONS 1-9: 17

## 2017-07-12 ASSESSMENT — ANXIETY QUESTIONNAIRES: GAD7 TOTAL SCORE: 19

## 2017-07-18 ENCOUNTER — OFFICE VISIT (OUTPATIENT)
Dept: BEHAVIORAL HEALTH | Facility: CLINIC | Age: 22
End: 2017-07-18
Payer: COMMERCIAL

## 2017-07-18 DIAGNOSIS — M35.2 BEHCET'S DISEASE (H): ICD-10-CM

## 2017-07-18 DIAGNOSIS — K58.9 IRRITABLE BOWEL SYNDROME: ICD-10-CM

## 2017-07-18 DIAGNOSIS — F33.1 MODERATE EPISODE OF RECURRENT MAJOR DEPRESSIVE DISORDER (H): Primary | ICD-10-CM

## 2017-07-18 DIAGNOSIS — F41.1 GAD (GENERALIZED ANXIETY DISORDER): ICD-10-CM

## 2017-07-18 DIAGNOSIS — F43.10 PTSD (POST-TRAUMATIC STRESS DISORDER): ICD-10-CM

## 2017-07-18 PROCEDURE — 90834 PSYTX W PT 45 MINUTES: CPT | Performed by: SOCIAL WORKER

## 2017-07-18 NOTE — MR AVS SNAPSHOT
MRN:[id number]                      After Visit Summary   7/18/2017    Samara Oropeza    MRN: [id number]           Visit Information        Provider Department      7/18/2017 2:30 PM Layla Browne LICSW [facility]      Your next 10 appointments already scheduled     Jul 20, 2017 11:00 AM CDT   Return Visit with Kimo Hudson LP   [facility] ([facility])    [address] [id number] [phone]            Jul 27, 2017  9:15 AM CDT   New Visit with Emily Rollins, TAINA   [facility] ([facility])    [address] [place] [id number] [phone]            Aug 01, 2017  2:30 PM CDT   Return Visit with ALISA Burt   [facility] ([facility])    [address] [id number] [phone]            Aug 08, 2017 10:30 AM CDT   Return Visit with Layla Browne [facility] ([facility])    [address] [id number] [phone]            Aug 16, 2017  2:45 PM CDT   Return Visit with Cata Hurst NP   [facility] ([facility]    [address]  [address]  [facility] [id number] [phone]            Aug 22, 2017 11:00 AM CDT   Return Visit with Austen Marquez MD   [facility] ([facility])    [address] [id number] [phone]            Sep 13, 2017  3:00 PM CDT   (Arrive by 2:45 PM)   Return Botox with Frederick Bender MD   [facility] ([facility])    [address]  [address] [facility] [address] [phone]             "  MyChart Information     [employer] lets you send messages to your doctor, view your test results, renew your prescriptions, schedule appointments and more. To sign up, go to [email or web address] . Click on \"Log in\" on the left side of the screen, which will take you to the Welcome page. Then click on \"Sign up Now\" on the right side of the page.     You will be asked to enter the access code listed below, as well as some personal information. Please follow the directions to create your username and password.     Your access code is: [email or web address]  Expires: 2017  1:39 PM     Your access code will  in 90 days. If you need help or a new code, please call your [facility] clinic or [phone].        Care EveryWhere ID     This is your Care EveryWhere ID. This could be used by other organizations to access your [facility] medical records  [id number]        Equal Access to Services     NABIL GALEANO AH: Hadcarlin byerso Sotiffany, waaxda lubridget, qaybta kaalmada adeonel, mike parsons. So [facility] [phone].    ATENCIÓN: Si habla leslieañol, tiene a [place] disposición servicios gratuitos de asistencia lingüística. Llame al [phone].    We comply with applicable federal civil rights laws and [place] laws. We do not discriminate on the basis of race, color, national origin, age, disability sex, sexual orientation or gender identity.            "

## 2017-07-18 NOTE — TELEPHONE ENCOUNTER
linzess     Last Written Prescription Date:  4/26/16  Last Fill Quantity: 30,   # refills: 1  Last Office Visit : 4/21/17  Future Office visit:  no    Routing refill request to provider for review/approval because:  Last ordered 4/2016 med not mentioned in last chart note. Routing to make sure med is to be continued

## 2017-07-18 NOTE — PROGRESS NOTES
"                                             Progress Note    Client Name: Samara Oropeza  Date: 7/18/2017              Service Type: Individual      Session Start Time: 2:35  Session End Time: 3:30      Session Length: 45     Session #: 13     Attendees: Client attended alone    Treatment Plan Last Reviewed: 6/27/2017    PHQ-9 / TAHIR-7 : 7/11/2017       DATA      Progress Since Last Session (Related to Symptoms / Goals / Homework):   Symptoms: Worsening    Homework: Partially completed- will taper up her Buspar, did not take care of her financial matters, struggling with grief      Episode of Care Goals: Satisfactory progress - ACTION (Actively working towards change); Intervened by reinforcing change plan / affirming steps taken     Current / Ongoing Stressors and Concerns:  Client reports that she had one visit to the ED due to alcohol poisoning.  Was admitted to [facility]- stayed one night.  Has hx of seizure. Had put her down earlier that day.   Started the Buspar.  Was able to admit that she drank due to not wanting to deal with the loss of her pet. Reports that sleep is about 3-4 hours, many nightmares and flashbacks.  Writer reiterated the eed to be sober from alcohol and focus on not avoiding mom's home because of the death of her dog.  She and bf are working towards moving, will hopefully find out this week if they can move to new complex.     Treatment Objective(s) Addressed in This Session:   use cognitive strategies identified in therapy to challenge anxious thoughts  Increase restful sleep     Intervention:   CBT: breaking down tasks and setting up realistic goals        ASSESSMENT: Current Emotional / Mental Status (status of significant symptoms):   Risk status (Self / Other harm or suicidal ideation)   Client denies current fears or concerns for personal safety.   Client denies current or recent suicidal ideation or behaviors. Had one day when passive SI thought-shared with her family \"i " "want to go and be with my dog\"    Client denies current or recent homicidal ideation or behaviors.   Client denies current or recent self injurious behavior or ideation.   Client denies other safety concerns.   A safety and risk management plan has not been developed at this time, however client was given the after-hours number / 911 should there be a change in any of these risk factors.     Appearance:   Appropriate    Eye Contact:   Fair    Psychomotor Behavior: Normal    Attitude:   Cooperative    Orientation:   All   Speech    Rate / Production: Monotone     Volume:  Soft    Mood:    Anxious  Depressed    Affect:    Flat  Worrisome  teary at times    Thought Content:  Clear    Thought Form:  Coherent  Logical    Insight:    Fair      Medication Review:   No changes to current psychiatric medication(s)     Medication Compliance:   Yes encouraged to start medications     Changes in Health Issues:   Yes: Pain, Associated Psychological Distress- started working with pain management     Chemical Use Review:   Substance Use: increase in alcohol .  Client reports frequency of use once on Sauturday which resulted in trip to [facility].  Provided encouragement towards sobriety        Tobacco Use: No current tobacco use.       Collateral Reports Completed:   Not Applicable    PLAN: (Client Tasks / Therapist Tasks / Other)  Homework:      1.  Continue with new medication  2.  Continue with goals around self-care  3.  Identify and take smaller more manageable steps towards goals of moving and finances.      Layla Browne, [facility]                                                         ________________________________________________________________________    Treatment Plan    Client's Name: Samara Oropeza  YOB: 1995    Date: 3/14/2017     DSM-V Diagnoses: 296.32 Major Depressive Disorder, Recurrent Episode, Moderate _ or 300.02 (F41.1) Generalized Anxiety Disorder  Psychosocial / Contextual " Factors: Moderate- Minimal contact with family, Health issues  WHODAS: 30    Referral / Collaboration:  Referral to another professional/service is not indicated at this time..    Anticipated number of session or this episode of care: 12      MeasurableTreatment Goal(s) related to diagnosis / functional impairment(s)  Goal 1: Client will decrease anxiety symptoms as eveidenced by self-report and TAHIR-7 scores, currently at 14, goal to 7 or below    I will know I've met my goal when I am better able to deal with it.      Objective #A (Client Action)    Client will use relaxation strategies 3x times per day to reduce the physical symptoms of anxiety.  Status: Continued - Date(s): 6/27/2017      Intervention(s)  Therapist will teach different relaxation strategies and have client complete one between session.    Objective #B  Client will use cognitive strategies identified in therapy to challenge anxious thoughts.  Status: Continued - Date(s): 6/27/2017      Intervention(s)  Therapist will 1.  introduce cognitive distortions; 2. build awareness for client; 3. leanr an implement diffferent cognitive restructuring techniques.    Objective #C  Client will increase her amount of restful sleep.  Status: Continued - Date(s): 6/27/2017      Intervention(s)  Therapist will 1. start a sleep journal; 2. Introduce techniques for falling and staying asleep.        Client has reviewed and agreed to the above plan.      Layla Browne, [facility]  March 14, 2017

## 2017-07-20 ENCOUNTER — OFFICE VISIT (OUTPATIENT)
Dept: PALLIATIVE MEDICINE | Facility: CLINIC | Age: 22
End: 2017-07-20
Payer: COMMERCIAL

## 2017-07-20 DIAGNOSIS — M25.50 POLYARTHRALGIA: ICD-10-CM

## 2017-07-20 DIAGNOSIS — G89.4 CHRONIC PAIN SYNDROME: Primary | ICD-10-CM

## 2017-07-20 DIAGNOSIS — Z87.39 HISTORY OF FIBROMYALGIA: ICD-10-CM

## 2017-07-20 DIAGNOSIS — F41.9 ANXIETY: ICD-10-CM

## 2017-07-20 PROCEDURE — 96152 HC HEALTH AND BEHAVIOR INTERVENTION, INDIVIDUAL, EACH 15 MINUTES: CPT | Performed by: PSYCHOLOGIST

## 2017-07-20 NOTE — MR AVS SNAPSHOT
After Visit Summary   7/20/2017    Samara Oropeza    MRN: [id number]           Patient Information     Date Of Birth          1995        Visit Information        Provider Department      7/20/2017 11:00 AM Kimo Hudson LP [facility]        Today's Diagnoses     Chronic pain syndrome    -  1    History of fibromyalgia        Polyarthralgia        Anxiety           Follow-ups after your visit        Your next 10 appointments already scheduled     Jul 27, 2017  9:15 AM CDT   New Visit with Emily Rollins PT   [facility] ([facility])    [address] [place] [address]   [phone]            Aug 01, 2017  2:30 PM CDT   Return Visit with ALISA Burt   [facility] ([facility]    [address] [id number] [phone]            Aug 03, 2017 11:00 AM CDT   Return Visit with Kimo Hudson LP   [facility] [place] ([facility] [place])    [address] [facility] [address]  [place] [address] [phone]            Aug 08, 2017 10:30 AM CDT   Return Visit with ALISA Burt   [facility] ([facility]    [address] [id number] [phone]            Aug 16, 2017  2:45 PM CDT   Return Visit with Cata Hurst NP   [facility] ([facility])    [address]  [address]  [facility] [id number] [phone]            Aug 17, 2017 11:00 AM CDT   Return Visit with Kimo Hudson LP   [facility] [place] ([facility] [place])    [address] [address]  [place] [address] [phone]            Aug 22, 2017 11:00 AM CDT   Return Visit with Austen Marquez MD   [facility] ([facility]     "[address] [address]   [phone]            Sep 13, 2017  3:00 PM CDT   (Arrive by 2:45 PM)   Return Botox with Frederick Bender MD   [facility] ([facility])    [address]  [address] [facility] [address] [phone]              Who to contact     If you have questions or need follow up information about today's clinic visit or your schedule please contact [facility] directly at [phone].  Normal or non-critical lab and imaging results will be communicated to you by Tagasaurishart, letter or phone within 4 business days after the clinic has received the results. If you do not hear from us within 7 days, please contact the clinic through Tagasaurishart or phone. If you have a critical or abnormal lab result, we will notify you by phone as soon as possible.  Submit refill requests through [email or web address] or call your pharmacy and they will forward the refill request to us. Please allow 3 business days for your refill to be completed.          Additional Information About Your Visit        Tagasaurishart Information     [email or web address] lets you send messages to your doctor, view your test results, renew your prescriptions, schedule appointments and more. To sign up, go to [email or web address] . Click on \"Log in\" on the left side of the screen, which will take you to the Welcome page. Then click on \"Sign up Now\" on the right side of the page.     You will be asked to enter the access code listed below, as well as some personal information. Please follow the directions to create your username and password.     Your access code is: [email or web address]  Expires: 2017  1:39 PM     Your access code will  in 90 days. If you need help or a new code, please call your [facility] or [phone].        Care EveryWhere ID     This is your Care EveryWhere ID. This could be used by other organizations to access your [place] " medical records  [id number]        Your Vitals Were     Last Period                   06/01/2017            Blood Pressure from Last 3 Encounters:   06/27/17 100/60   06/23/17 126/78   06/06/17 111/72    Weight from Last 3 Encounters:   06/27/17 66.2 kg (146 lb)   06/23/17 66.2 kg (146 lb)   06/06/17 66.7 kg (147 lb)              We Performed the Following     HEALTH & BEHAVIOR ASSESS, INITIAL, EA 15MIN PHD        Primary Care Provider Office Phone # Fax #    Sonja Annabella Abreu, EVI [facility] [phone]       [facility] [address] [facility] [address]  [facility] [address]        Equal Access to Services     NABIL GALEANO : Hadii aad jose antonio hadasho Soteddyali, waaxda luqadaha, qaybta kaalmada adeegyada, mike rodriguez . So [facility] [phone].    ATENCIÓN: Si habla español, tiene a livingston disposición servicios gratuitos de asistencia lingüística. Llame al [phone].    We comply with applicable federal civil rights laws and [place] laws. We do not discriminate on the basis of race, color, national origin, age, disability sex, sexual orientation or gender identity.            Thank you!     Thank you for choosing [facility]  for your care. Our goal is always to provide you with excellent care. Hearing back from our patients is one way we can continue to improve our services. Please take a few minutes to complete the written survey that you may receive in the mail after your visit with us. Thank you!             Your Updated Medication List - Protect others around you: Learn how to safely use, store and throw away your medicines at www.disposemymeds.org.          This list is accurate as of: 7/20/17  2:22 PM.  Always use your most recent med list.                   Brand Name Dispense Instructions for use Diagnosis    albuterol 108 (90 BASE) MCG/ACT Inhaler    PROAIR HFA/PROVENTIL HFA/VENTOLIN HFA    1 Inhaler    Inhale 2 puffs into the lungs every 6 hours as needed for  shortness of breath / dyspnea or wheezing    Atypical chest pain       amitriptyline 25 MG tablet    ELAVIL    45 tablet    Take 1 tablet (25 mg) by mouth At Bedtime    Atypical chest pain, Insomnia, unspecified type       apremilast 30 MG tablet    OTEZLA    60 tablet    20 mg in AM and 30mg in PM daily X 2 weeks and then 30mg twice daily.    Behcet's disease (H)       ASPIRIN CHILDRENS 81 MG chewable tablet   Generic drug:  aspirin      Take 81 mg by mouth daily        BENADRYL 25 MG tablet   Generic drug:  diphenhydrAMINE      Take 25 mg by mouth nightly as needed for itching or allergies        benzonatate 100 MG capsule    TESSALON    42 capsule    Take 1-2 pills THREE TIMES PER DAY as needed for cough    Acute bronchitis with symptoms > 10 days       betamethasone valerate 0.1 % cream    VALISONE     Apply topically 2 times daily        * botulinum toxin type A 100 UNITS injection    BOTOX    200 Units    Inject 200 Units into the muscle every 3 months    Cervical dystonia       * BOTOX IJ      Inject 150 Units into the muscle once Lot: /C3 Exp: 05/2019        * BOTOX IJ      Inject 150 Units into the muscle Lot # /C3  Exp: 8/2019        * BOTOX IJ      Inject 162.5 Units as directed once Lot #: /C3 Exp: 10/2019        * ONABOTULINUMTOXINA IJ      Inject 165 Units as directed once Lot # /C3 Expiration Date: 01/2020        busPIRone 10 MG tablet    BUSPAR    60 tablet    Start 1/2 tablet by mouth two times per day for one week, then increase to 1 tablet by mouth two times per day. For anxiety.    TAHIR (generalized anxiety disorder)       carbamide peroxide 6.5 % otic solution    DEBROX     5 drops 2 times daily        clobetasol 0.05 % cream    TEMOVATE    60 g    Apply topically 2 times daily    Folliculitis       Colchicine 0.6 MG Caps     90 capsule    Take 0.6 mg by mouth See Admin Instructions 2 capsules in AM and 1 capsule in PM    Behcet's syndrome (H)       dexamethasone 4 MG/ML  injection    DECADRON    30 mL    Apply 1 mL (4 mg) topically as needed    Sprain of other ligament of left ankle, initial encounter       diclofenac 1 % Gel topical gel    VOLTAREN    100 g    Apply 2-4 grams to affected area(s) up to 4 times per day as needed. This is an anti-inflammatory medication.    Polyarthralgia       dicyclomine 20 MG tablet    BENTYL    60 tablet    Take 1 tablet (20 mg) by mouth 4 times daily as needed    Abdominal cramping       doxycycline 100 MG capsule    VIBRAMYCIN    20 capsule    Take 1 capsule (100 mg) by mouth 2 times daily    Acute bronchitis with symptoms > 10 days       EPINEPHrine 0.3 MG/0.3ML injection 2-pack    EPIPEN 2-EAMON    0.6 mL    Inject 0.3 mLs (0.3 mg) into the muscle as needed for anaphylaxis    Hx of bee sting allergy       EXCEDRIN MIGRAINE PO      Take by mouth as needed        guanFACINE 1 MG tablet    TENEX    180 tablet    Take 3 tablets (3 mg) by mouth twice daily in the morning and evening daily.    Tic       hyoscyamine 0.125 MG tablet    ANASPAZ/LEVSIN    40 tablet    Take 1-2 tablets (125-250 mcg) by mouth every 4 hours as needed for cramping    Abdominal pain, generalized       hypromellose 0.3 % Soln ophthalmic solution    GENTEAL     1 drop every hour as needed        LACTAID PO      Take by mouth daily        lactulose 20 GM/30ML Soln     300 mL    Take 30 mLs by mouth 3 times daily as needed        lidocaine 2 % topical gel    XYLOCAINE     Apply 1 Tube topically daily Reported on 4/21/2017        lidocaine visc 2% 2.5mL/5mL & maalox/mylanta w/ simeth 2.5mL/5mL & diphenhydrAMINE 5mg/5mL Susp suspension    [facility]    1 Bottle    Swish and swallow 10 mLs in mouth every 6 hours as needed for mouth sores    Behcet's syndrome (H)       linaclotide 290 MCG capsule    LINZESS    90 capsule    Take 1 capsule (290 mcg) by mouth every morning (before breakfast)    Irritable bowel syndrome       LORazepam 1 MG tablet    ATIVAN    90 tablet     Take 0.5 tablets (0.5 mg) by mouth 2 times daily as needed for other (abdominal pain) Do not operate a vehicle after taking this medication    Chronic abdominal pain       meclizine 25 MG tablet    ANTIVERT    30 tablet    Take 1 tablet (25 mg) by mouth every 6 hours as needed for dizziness    Nausea       metaxalone 800 MG tablet    SKELAXIN    30 tablet    Take 0.5 tablets (400 mg) by mouth 3 times daily as needed for moderate pain    Sprain of neck, initial encounter, Cervical dystonia       norgestimate-ethinyl estradiol 0.25-35 MG-MCG per tablet    ORTHO-CYCLEN, SPRINTEC    84 tablet    Take 1 tablet by mouth daily    General counseling for prescription of oral contraceptives       omeprazole 40 MG capsule    priLOSEC    90 capsule    Take 1 capsule (40 mg) by mouth daily    Gastroesophageal reflux disease, esophagitis presence not specified       ondansetron 8 MG ODT tab    ZOFRAN-ODT    60 tablet    Take 1 tablet (8 mg) by mouth every 8 hours as needed for nausea    Non-intractable vomiting with nausea, unspecified vomiting type, Hematemesis, presence of nausea not specified       promethazine 25 MG tablet    PHENERGAN    20 tablet    Take 0.5-1 tablets (12.5-25 mg) by mouth every 6 hours as needed for nausea    Intractable chronic migraine without aura and without status migrainosus       RANITIDINE 75 PO      Take  by mouth. As needed for GI upset        sucralfate 1 GM/10ML suspension    CARAFATE    1200 mL    Take 10 mLs (1 g) by mouth 4 times daily    Bile reflux gastritis, Nausea       SUMAtriptan 100 MG tablet    IMITREX    18 tablet    Take 1 tablet (100 mg) by mouth at onset of headache for migraine May repeat in 2 hours. Max 2 tablets/24 hours.    Intractable chronic migraine without aura and without status migrainosus       triamcinolone 0.1 % cream    KENALOG    15 g    Apply sparingly to oral ulcers three times daily for 14 days as needed.    Behcet's syndrome (H)       * Notice:  This list has 5  medication(s) that are the same as other medications prescribed for you. Read the directions carefully, and ask your doctor or other care provider to review them with you.

## 2017-07-20 NOTE — PROGRESS NOTES
[facility]   [facility] [place]  Behavioral Medicine Visit    Patient Name: Samara Oropeza    YOB: 1995   Medical Record Number: [id number]  Date: 7/20/2017                SUBJECTIVE:  Session objective: Met with patient on this date for an elective pain psychology return appointment. This is our first appointment post intake. Today the patient reports the following: Her pain has been moderately worse, her mood has been mildly worse, her activity level has remained the same, her stress level has been moderately worse, and her sleep has been mildly worse.    Since our last meeting the patient reports the following stressors: 1 chest pain and jaw pain that she attributes to possible esophageal spasms as well as running out of her medication (otezla). She has been without her medication for over one week. Since our last meeting the patient also had a severe binge drinking episode where she was taken to an emergency room non-responsive. The patient reports she does not routinely drink and this instance of intense, rapid overdrinking resulted in acute, high intoxication. We did a best guess assessment of her blood alcohol content and it was in the range of 0.27- 0.31. The patient has not had a episode similar to this at any point in her past.    We discussed possible interventions for pain psychology which included anxiety for the future of living with pain, behavioral interventions to address her pacing and steps that might assist her in being more separate from the numerous medical and psychiatric labels and experiences she encounters routinely. We also spent time in today's session discussing what my role would not be i.e. not a mental health therapist. Finally we discussed whether or not the patient would like her father to continue to attend appointments with her (he is present for today's appointment). The  patient will decide what she feels is most appropriate.        OBJECTIVE:   Length of Visit: 60 minutes      Assessment: Current Emotional / Mental Status    Appearance:   Appropriate   Eye Contact:   Fair   Psychomotor Behavior: Retarded (Slowed)   Attitude:   Cooperative  passive   Orientation:   All  Speech   Rate / Production: Slow    Volume:  Soft   Mood:    Anxious   Affect:    Appropriate  Flat  Subdued  Worrisome   Thought Content:  Clear   Thought Form:  Coherent  Logical   Insight:    Fair     ASSESSMENT:   Axis I: Chronic pain syndrome, history of fibromyalgia, polyarthralgia, anxiety  Axis II: Dx deferred    Progress toward goals: Initial treatment goals established on this date. Patient to discuss further with family what she might want to focus on as a subset of our conversation and bring that to our next meeting..    Pain Status: worsened    Emotional Status: worsened   Medication / chemical use concerns: None    PLAN:   Next Appointment: Samara Oropeza will schedule a follow-up appointment in 2-3 weeks.  Assignment: See above  Objectives / interventions for next session: See above    Kimo Hudson MA, LP, [facility]  Pain Specialist  [facility]

## 2017-07-27 ENCOUNTER — OFFICE VISIT (OUTPATIENT)
Dept: PALLIATIVE MEDICINE | Facility: CLINIC | Age: 22
End: 2017-07-27
Payer: COMMERCIAL

## 2017-07-27 DIAGNOSIS — G89.4 CHRONIC PAIN SYNDROME: Primary | ICD-10-CM

## 2017-07-27 PROCEDURE — 97162 PT EVAL MOD COMPLEX 30 MIN: CPT | Mod: GP | Performed by: PHYSICAL THERAPIST

## 2017-07-27 PROCEDURE — 97112 NEUROMUSCULAR REEDUCATION: CPT | Mod: GP | Performed by: PHYSICAL THERAPIST

## 2017-07-27 NOTE — MR AVS SNAPSHOT
After Visit Summary   7/27/2017    Samara Oropeza    MRN: [id number]           Patient Information     Date Of Birth          1995        Visit Information        Provider Department      7/27/2017 9:15 AM Emily Rollins, PT [facility]        Today's Diagnoses     Chronic pain syndrome    -  1       Follow-ups after your visit        Your next 10 appointments already scheduled     Jul 31, 2017 12:00 PM CDT   MR KNEE LEFT W/O CONTRAST with UUMR2   [facility] [place], MRI ([facility], [facility])    [address] [address] [phone]           Take your medicines as usual, unless your doctor tells you not to. Bring a list of your current medicines to your exam (including vitamins, minerals and over-the-counter drugs). Also bring the results of similar scans you may have had.  Please remove any body piercings and hair extensions before you arrive.  Follow your doctor s orders. If you do not, we may have to postpone your exam.  You will not have contrast for this exam. You do not need to do anything special to prepare.  The MRI machine uses a strong magnet. Please wear clothes without metal (snaps, zippers). A sweatsuit works well, or we may give you a hospital gown.   **IMPORTANT** THE INSTRUCTIONS BELOW ARE ONLY FOR THOSE PATIENTS WHO HAVE BEEN TOLD THEY WILL RECEIVE SEDATION OR GENERAL ANESTHESIA DURING THEIR MRI PROCEDURE:  IF YOU WILL RECEIVE SEDATION (take medicine to help you relax during your exam):   You must get the medicine from your doctor before you arrive. Bring the medicine to the exam. Do not take it at home.   Arrive one hour early. Bring someone who can take you home after the test. Your medicine will make you sleepy. After the exam, you may not drive, take a bus or take a taxi by yourself.   No eating 8 hours before your exam. You may have clear liquids up until 4 hours before your exam.  (Clear liquids include water, clear tea, black coffee and fruit juice without pulp.)  IF YOU WILL RECEIVE ANESTHESIA (be asleep for your exam):   Arrive 1 1/2 hours early. Bring someone who can take you home after the test. You may not drive, take a bus or take a taxi by yourself.   No eating 8 hours before your exam. You may have clear liquids up until 4 hours before your exam. (Clear liquids include water, clear tea, black coffee and fruit juice without pulp.)   You will spend four to five hours in the recovery room.  Please call the Imaging Department at your exam site with any questions.            Aug 01, 2017  2:30 PM CDT   Return Visit with ALISA Burt   [facility] ([facility])    [address] [id number] [phone]            Aug 03, 2017  8:30 AM CDT   Return Visit with Emily Rollins PT   [facility] ([facility])    [address]  [address]  [facility] [id number] [phone]            Aug 03, 2017 11:00 AM CDT   Return Visit with Kimo Hudson LP   [facility] ([facility])    [address] [place] [id number] [phone]            Aug 08, 2017 10:30 AM CDT   Return Visit with ALISA Burt   [facility] ([facility])    [address] [id number] [phone]            Aug 10, 2017 10:00 AM CDT   Return Visit with Emily Rollins PT   [facility] ([facility])    [address]  [facility] [id number] [phone]            Aug 16, 2017  2:45 PM CDT   Return Visit with Cata Hurst NP   [facility]    [address]  [address]  [facility] [id number] [phone]            Aug 17, 2017 11:00 AM CDT   Return  "Visit with Kimo Hudson LP   [facility] ([facility] [place])    [address]  [place] [phone] [phone]            Aug 17, 2017  1:45 PM CDT   Return Visit with Emily Rollins PT   [facility] ([facility])    [address] [place] [address]  [facility] [phone] [phone]            Aug 22, 2017 11:00 AM CDT   Return Visit with Austen Marquez MD   [facility] ([facility])    [address] [address] [phone]              Who to contact     If you have questions or need follow up information about today's clinic visit or your schedule please contact [facility] directly at [phone].  Normal or non-critical lab and imaging results will be communicated to you by MyChart, letter or phone within 4 business days after the clinic has received the results. If you do not hear from us within 7 days, please contact the clinic through [employer] or phone. If you have a critical or abnormal lab result, we will notify you by phone as soon as possible.  Submit refill requests through [employer] or call your pharmacy and they will forward the refill request to us. Please allow 3 business days for your refill to be completed.          Additional Information About Your Visit        [employer] Information     [employer] lets you send messages to your doctor, view your test results, renew your prescriptions, schedule appointments and more. To sign up, go to [email or web address] . Click on \"Log in\" on the left side of the screen, which will take you to the Welcome page. Then click on \"Sign up Now\" on the right side of the page.     You will be asked to enter the access code listed below, as well as some personal information. Please follow the directions to create your username and password.     Your access code is: [email or web address]  Expires: " 10/29/2017 10:26 AM     Your access code will  in 90 days. If you need help or a new code, please call your [facility] clinic or [phone].        Care EveryWhere ID     This is your Care EveryWhere ID. This could be used by other organizations to access your [facility] medical records  [id number]        Your Vitals Were     Last Period                   2017            Blood Pressure from Last 3 Encounters:   17 122/83   17 100/60   17 126/78    Weight from Last 3 Encounters:   17 65.8 kg (145 lb)   17 66.2 kg (146 lb)   17 66.2 kg (146 lb)              We Performed the Following     HC PT EVAL, MODERATE COMPLEXITY     NEUROMUSCULAR RE-EDUCATION        Primary Care Provider Office Phone # Fax #    Sonja EVI Laguerre [phone]       [facility] [address]  [facility] [address]        Equal Access to Services     NABIL GALEANO : Hadii aad ku hadasho Soomaali, waaxda luqadaha, qaybta kaalmada adeegyada, waxay idiin hayaan maddie rodriguez . So [facility] [phone].    ATENCIÓN: Si habla español, tiene a livingston disposición servicios gratuitos de asistencia lingüística. Llame al [phone].    We comply with applicable federal civil rights laws and [place] laws. We do not discriminate on the basis of race, color, national origin, age, disability sex, sexual orientation or gender identity.            Thank you!     Thank you for choosing [facility]  for your care. Our goal is always to provide you with excellent care. Hearing back from our patients is one way we can continue to improve our services. Please take a few minutes to complete the written survey that you may receive in the mail after your visit with us. Thank you!             Your Updated Medication List - Protect others around you: Learn how to safely use, store and throw away your medicines at www.disposemymeds.org.          This list is  accurate as of: 7/27/17 11:59 PM.  Always use your most recent med list.                   Brand Name Dispense Instructions for use Diagnosis    albuterol 108 (90 BASE) MCG/ACT Inhaler    PROAIR HFA/PROVENTIL HFA/VENTOLIN HFA    1 Inhaler    Inhale 2 puffs into the lungs every 6 hours as needed for shortness of breath / dyspnea or wheezing    Atypical chest pain       amitriptyline 25 MG tablet    ELAVIL    45 tablet    Take 1 tablet (25 mg) by mouth At Bedtime    Atypical chest pain, Insomnia, unspecified type       apremilast 30 MG tablet    OTEZLA    60 tablet    20 mg in AM and 30mg in PM daily X 2 weeks and then 30mg twice daily.    Behcet's disease (H)       ASPIRIN CHILDRENS 81 MG chewable tablet   Generic drug:  aspirin      Take 81 mg by mouth daily        BENADRYL 25 MG tablet   Generic drug:  diphenhydrAMINE      Take 25 mg by mouth nightly as needed for itching or allergies        betamethasone valerate 0.1 % cream    VALISONE     Apply topically 2 times daily        * botulinum toxin type A 100 UNITS injection    BOTOX    200 Units    Inject 200 Units into the muscle every 3 months    Cervical dystonia       * BOTOX IJ      Inject 150 Units into the muscle once Lot: /C3 Exp: 05/2019        * BOTOX IJ      Inject 150 Units into the muscle Lot # /C3  Exp: 8/2019        * BOTOX IJ      Inject 162.5 Units as directed once Lot #: /C3 Exp: 10/2019        * ONABOTULINUMTOXINA IJ      Inject 165 Units as directed once Lot # /C3 Expiration Date: 01/2020        busPIRone 10 MG tablet    BUSPAR    60 tablet    Start 1/2 tablet by mouth two times per day for one week, then increase to 1 tablet by mouth two times per day. For anxiety.    TAHIR (generalized anxiety disorder)       carbamide peroxide 6.5 % otic solution    DEBROX     5 drops 2 times daily        clobetasol 0.05 % cream    TEMOVATE    60 g    Apply topically 2 times daily    Folliculitis       Colchicine 0.6 MG Caps     90 capsule     Take 0.6 mg by mouth See Admin Instructions 2 capsules in AM and 1 capsule in PM    Behcet's syndrome (H)       dexamethasone 4 MG/ML injection    DECADRON    30 mL    Apply 1 mL (4 mg) topically as needed    Sprain of other ligament of left ankle, initial encounter       diclofenac 1 % Gel topical gel    VOLTAREN    100 g    Apply 2-4 grams to affected area(s) up to 4 times per day as needed. This is an anti-inflammatory medication.    Polyarthralgia       dicyclomine 20 MG tablet    BENTYL    60 tablet    Take 1 tablet (20 mg) by mouth 4 times daily as needed    Abdominal cramping       EPINEPHrine 0.3 MG/0.3ML injection 2-pack    EPIPEN 2-EAMON    0.6 mL    Inject 0.3 mLs (0.3 mg) into the muscle as needed for anaphylaxis    Hx of bee sting allergy       EXCEDRIN MIGRAINE PO      Take by mouth as needed        guanFACINE 1 MG tablet    TENEX    180 tablet    Take 3 tablets (3 mg) by mouth twice daily in the morning and evening daily.    Tic       hyoscyamine 0.125 MG tablet    ANASPAZ/LEVSIN    40 tablet    Take 1-2 tablets (125-250 mcg) by mouth every 4 hours as needed for cramping    Abdominal pain, generalized       hypromellose 0.3 % Soln ophthalmic solution    GENTEAL     1 drop every hour as needed        LACTAID PO      Take by mouth daily        lactulose 20 GM/30ML Soln     300 mL    Take 30 mLs by mouth 3 times daily as needed        lidocaine 2 % topical gel    XYLOCAINE     Apply 1 Tube topically daily Reported on 4/21/2017        lidocaine visc 2% 2.5mL/5mL & maalox/mylanta w/ simeth 2.5mL/5mL & diphenhydrAMINE 5mg/5mL Susp suspension    [facility]    1 Bottle    Swish and swallow 10 mLs in mouth every 6 hours as needed for mouth sores    Behcet's syndrome (H)       linaclotide 290 MCG capsule    LINZESS    90 capsule    Take 1 capsule (290 mcg) by mouth every morning (before breakfast)    Irritable bowel syndrome       LORazepam 1 MG tablet    ATIVAN    90 tablet    Take 0.5 tablets (0.5  mg) by mouth 2 times daily as needed for other (abdominal pain) Do not operate a vehicle after taking this medication    Chronic abdominal pain       meclizine 25 MG tablet    ANTIVERT    30 tablet    Take 1 tablet (25 mg) by mouth every 6 hours as needed for dizziness    Nausea       metaxalone 800 MG tablet    SKELAXIN    30 tablet    Take 0.5 tablets (400 mg) by mouth 3 times daily as needed for moderate pain    Sprain of neck, initial encounter, Cervical dystonia       norgestimate-ethinyl estradiol 0.25-35 MG-MCG per tablet    ORTHO-CYCLEN, SPRINTEC    84 tablet    Take 1 tablet by mouth daily    General counseling for prescription of oral contraceptives       omeprazole 40 MG capsule    priLOSEC    90 capsule    Take 1 capsule (40 mg) by mouth daily    Gastroesophageal reflux disease, esophagitis presence not specified       ondansetron 8 MG ODT tab    ZOFRAN-ODT    60 tablet    Take 1 tablet (8 mg) by mouth every 8 hours as needed for nausea    Non-intractable vomiting with nausea, unspecified vomiting type, Hematemesis, presence of nausea not specified       promethazine 25 MG tablet    PHENERGAN    20 tablet    Take 0.5-1 tablets (12.5-25 mg) by mouth every 6 hours as needed for nausea    Intractable chronic migraine without aura and without status migrainosus       RANITIDINE 75 PO      Take  by mouth. As needed for GI upset        sucralfate 1 GM/10ML suspension    CARAFATE    1200 mL    Take 10 mLs (1 g) by mouth 4 times daily    Bile reflux gastritis, Nausea       SUMAtriptan 100 MG tablet    IMITREX    18 tablet    Take 1 tablet (100 mg) by mouth at onset of headache for migraine May repeat in 2 hours. Max 2 tablets/24 hours.    Intractable chronic migraine without aura and without status migrainosus       triamcinolone 0.1 % cream    KENALOG    15 g    Apply sparingly to oral ulcers three times daily for 14 days as needed.    Behcet's syndrome (H)       * Notice:  This list has 5 medication(s) that  are the same as other medications prescribed for you. Read the directions carefully, and ask your doctor or other care provider to review them with you.
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
## 2017-07-31 ENCOUNTER — HOSPITAL ENCOUNTER (EMERGENCY)
Facility: CLINIC | Age: 22
Discharge: HOME | End: 2017-07-31
Attending: FAMILY MEDICINE | Admitting: FAMILY MEDICINE
Payer: COMMERCIAL

## 2017-07-31 ENCOUNTER — TELEPHONE (OUTPATIENT)
Dept: ORTHOPEDICS | Facility: CLINIC | Age: 22
End: 2017-07-31

## 2017-07-31 ENCOUNTER — HOSPITAL ENCOUNTER (EMERGENCY)
Facility: CLINIC | Age: 22
Discharge: HOME | End: 2017-08-01
Attending: EMERGENCY MEDICINE | Admitting: EMERGENCY MEDICINE
Payer: COMMERCIAL

## 2017-07-31 ENCOUNTER — TELEPHONE (OUTPATIENT)
Dept: PALLIATIVE MEDICINE | Facility: CLINIC | Age: 22
End: 2017-07-31

## 2017-07-31 ENCOUNTER — APPOINTMENT (OUTPATIENT)
Dept: MRI IMAGING | Facility: CLINIC | Age: 22
End: 2017-07-31
Attending: FAMILY MEDICINE
Payer: COMMERCIAL

## 2017-07-31 VITALS
BODY MASS INDEX: 25.69 KG/M2 | TEMPERATURE: 98.1 F | RESPIRATION RATE: 18 BRPM | OXYGEN SATURATION: 95 % | SYSTOLIC BLOOD PRESSURE: 118 MMHG | HEIGHT: 63 IN | DIASTOLIC BLOOD PRESSURE: 83 MMHG | WEIGHT: 145 LBS

## 2017-07-31 DIAGNOSIS — I49.3 VENTRICULAR PREMATURE BEATS: ICD-10-CM

## 2017-07-31 DIAGNOSIS — G89.4 CHRONIC PAIN SYNDROME: ICD-10-CM

## 2017-07-31 DIAGNOSIS — R00.0 TACHYCARDIA, UNSPECIFIED: ICD-10-CM

## 2017-07-31 DIAGNOSIS — T78.40XA ALLERGIC REACTION, INITIAL ENCOUNTER: ICD-10-CM

## 2017-07-31 DIAGNOSIS — M25.562 ACUTE PAIN OF LEFT KNEE: ICD-10-CM

## 2017-07-31 PROCEDURE — 96361 HYDRATE IV INFUSION ADD-ON: CPT | Mod: 59

## 2017-07-31 PROCEDURE — 99284 EMERGENCY DEPT VISIT MOD MDM: CPT | Mod: 25

## 2017-07-31 PROCEDURE — 99284 EMERGENCY DEPT VISIT MOD MDM: CPT | Mod: 25 | Performed by: FAMILY MEDICINE

## 2017-07-31 PROCEDURE — 93005 ELECTROCARDIOGRAM TRACING: CPT

## 2017-07-31 PROCEDURE — 99283 EMERGENCY DEPT VISIT LOW MDM: CPT | Mod: Z6 | Performed by: FAMILY MEDICINE

## 2017-07-31 PROCEDURE — 96375 TX/PRO/DX INJ NEW DRUG ADDON: CPT

## 2017-07-31 PROCEDURE — 93010 ELECTROCARDIOGRAM REPORT: CPT | Mod: Z6 | Performed by: EMERGENCY MEDICINE

## 2017-07-31 PROCEDURE — 73721 MRI JNT OF LWR EXTRE W/O DYE: CPT | Mod: LT

## 2017-07-31 PROCEDURE — 96374 THER/PROPH/DIAG INJ IV PUSH: CPT

## 2017-07-31 PROCEDURE — 99291 CRITICAL CARE FIRST HOUR: CPT | Mod: 25 | Performed by: EMERGENCY MEDICINE

## 2017-07-31 PROCEDURE — 25000128 H RX IP 250 OP 636: Performed by: EMERGENCY MEDICINE

## 2017-07-31 RX ORDER — METHYLPREDNISOLONE SODIUM SUCCINATE 125 MG/2ML
125 INJECTION, POWDER, LYOPHILIZED, FOR SOLUTION INTRAMUSCULAR; INTRAVENOUS ONCE
Status: COMPLETED | OUTPATIENT
Start: 2017-07-31 | End: 2017-07-31

## 2017-07-31 RX ADMIN — METHYLPREDNISOLONE SODIUM SUCCINATE 125 MG: 125 INJECTION, POWDER, FOR SOLUTION INTRAMUSCULAR; INTRAVENOUS at 23:21

## 2017-07-31 RX ADMIN — SODIUM CHLORIDE, POTASSIUM CHLORIDE, SODIUM LACTATE AND CALCIUM CHLORIDE 1000 ML: 600; 310; 30; 20 INJECTION, SOLUTION INTRAVENOUS at 23:21

## 2017-07-31 RX ADMIN — RANITIDINE HYDROCHLORIDE 50 MG: 25 INJECTION INTRAMUSCULAR; INTRAVENOUS at 23:22

## 2017-07-31 ASSESSMENT — ENCOUNTER SYMPTOMS
WHEEZING: 0
NUMBNESS: 0
ARTHRALGIAS: 1
DECREASED CONCENTRATION: 0
FEVER: 0
HEADACHES: 0
BRUISES/BLEEDS EASILY: 0
FEVER: 0
TROUBLE SWALLOWING: 0
SHORTNESS OF BREATH: 0
JOINT SWELLING: 1
CONFUSION: 0
DIFFICULTY URINATING: 0
COLOR CHANGE: 0
SHORTNESS OF BREATH: 0
EYE REDNESS: 0
NECK STIFFNESS: 0
ABDOMINAL PAIN: 0
DYSPHORIC MOOD: 0
VOICE CHANGE: 0
ACTIVITY CHANGE: 1
WEAKNESS: 0
ABDOMINAL PAIN: 0

## 2017-07-31 NOTE — TELEPHONE ENCOUNTER
[facility]:  Nursing Note  SITUATION                                                      Samara Oropeza is a 21 year old female calling from the ER after an MRI of her left knee was ordered..    BACKGROUND                                                      Patient is s/p Left MPFL done 7/25/13..    Date of last clinic appointment:11/1/16  Does patient have a future appointment scheduled?  Yes -  next appointment is on: 8/1/17    Patient seen in ER and had an MRI.  Dr. Acevedo reviewed MRI and would like to see her in clinictomorrow.    ASSESSMENT      Will FU MRI    PLAN                                                          Will followup tomorrow in Dr. Acevedo's clinic.    If further questions/concerns or if symptoms do not improve, worsen or new symptoms develop, patient/family are advised to call [phone], option #3 to speak with a triage nurse, as soon as possible.    Gerri Moya

## 2017-07-31 NOTE — ED AVS SNAPSHOT
[facility], [place], Emergency Department    [address] [place] [address]    Phone:  [phone]                                       Samara Oropeza   MRN: [id number]    Department:  [facility], Emergency Department   Date of Visit:  7/31/2017           After Visit Summary Signature Page     I have received my discharge instructions, and my questions have been answered. I have discussed any challenges I see with this plan with the nurse or doctor.    ..........................................................................................................................................  Patient/Patient Representative Signature      ..........................................................................................................................................  Patient Representative Print Name and Relationship to Patient    ..................................................               ................................................  Date                                            Time    ..........................................................................................................................................  Reviewed by Signature/Title    ...................................................              ..............................................  Date                                                            Time

## 2017-07-31 NOTE — ED AVS SNAPSHOT
[facility], [facility], Emergency Department    [address]    [facility] [address]    Phone:  [phone]    Fax:  [phone]                                       Samara Oropeza   MRN: [id number]    Department:  [facility], Emergency Department   Date of Visit:  7/31/2017           After Visit Summary Signature Page     I have received my discharge instructions, and my questions have been answered. I have discussed any challenges I see with this plan with the nurse or doctor.    ..........................................................................................................................................  Patient/Patient Representative Signature      ..........................................................................................................................................  Patient Representative Print Name and Relationship to Patient    ..................................................               ................................................  Date                                            Time    ..........................................................................................................................................  Reviewed by Signature/Title    ...................................................              ..............................................  Date                                                            Time

## 2017-07-31 NOTE — ED PROVIDER NOTES
History     Chief Complaint   Patient presents with     Knee Injury     HPI  Samara Oropeza is a 21 year old female with a history of Behcet's disease, gastroparesis, chronic abdominal pain and constipation, GERD and fibromyalgia, status-post left knee reconstruction of medial patellofemoral ligament using allograft (7/25/13) by Dr. Acevedo. The patient presents to the emergency department with knee pain. The patient reports that she has had intermittent left knee pain, instability and swelling since she had her knee surgery in 2013. She notes that she has done physical therapy, and most recently finished 3 months ago. This morning, she stood up from bed and felt a sudden sharp, stabbing pain in her left knee, causing her to fall to the ground. She complains of continued left knee pain since she fall, and describes it as a sharp, stabbing, burning pain. She has been able to ambulate, though continues to have pain.   Patient here with father.  Patient states that they request to get an MRI scan but she hasn't gotten around to it.    Past Medical History:   Diagnosis Date     Anxiety      Arthritis      Behcet's disease (H)      Cervical adenitis May 2010     Chronic abdominal pain      Constipation, chronic 1994     Gastro-oesophageal reflux disease      Gastroparesis      Migraines      Neuromuscular disorder (H)     fibramyalgia     Palpitations      Seizure (H)      Seizures (H)     unknown etiology     Syncope      Tourette's        Past Surgical History:   Procedure Laterality Date     ARTHROSCOPY KNEE WITH PATELLAR REALIGNMENT  7/25/2013    Procedure: ARTHROSCOPY KNEE WITH PATELLAR REALIGNMENT;  Left Knee Arthroscopy, Medial Patellofemoral Ligament Reconstruction with Allograft  ;  Surgeon: Jennifer Acevedo MD;  Location: [facility]     DENTAL SURGERY  1996    Teeth removal     ENDOSCOPY UPPER, COLONOSCOPY, COMBINED  2005      ESOPH/GAS REFLUX TEST W NASAL IMPED >1 HR N/A 2/15/2017    Procedure:  ESOPHAGEAL IMPEDENCE FUNCTION TEST WITH 24 HOUR PH GREATER THAN 1 HOUR;  Surgeon: Timothy Matta MD;  Location: [facility]       Family History   Problem Relation Age of Onset     Depression Mother      Neurologic Disorder Mother      Migraines, take imitrex injection.  Also in maternal grandmother.       Alcohol/Drug Father      Hypertension Father      Depression Father      Cardiovascular Maternal Grandmother      Depression Maternal Grandmother      Hypertension Maternal Grandmother      Alzheimer Disease Maternal Grandmother      Cardiovascular Maternal Grandfather      Hypertension Maternal Grandfather      Depression Maternal Grandfather      Alcohol/Drug Maternal Grandfather      Cardiovascular Paternal Grandmother      Hypertension Paternal Grandmother      Cardiovascular Paternal Grandfather      Hypertension Paternal Grandfather      Glaucoma No family hx of      Macular Degeneration No family hx of        Social History   Substance Use Topics     Smoking status: Never Smoker     Smokeless tobacco: Never Used     Alcohol use Yes      Comment: seldom     No current facility-administered medications for this encounter.      Current Outpatient Prescriptions   Medication     linaclotide (LINZESS) 290 MCG capsule     diphenhydrAMINE (BENADRYL) 25 MG tablet     busPIRone (BUSPAR) 10 MG tablet     LORazepam (ATIVAN) 1 MG tablet     guanFACINE (TENEX) 1 MG tablet     lactulose 20 GM/30ML SOLN     sucralfate (CARAFATE) 1 GM/10ML suspension     diclofenac (VOLTAREN) 1 % GEL topical gel     dicyclomine (BENTYL) 20 MG tablet     amitriptyline (ELAVIL) 25 MG tablet     omeprazole (PRILOSEC) 40 MG capsule     apremilast (OTEZLA) 30 MG tablet     dexamethasone (DECADRON) 4 MG/ML injection     albuterol (PROAIR HFA/PROVENTIL HFA/VENTOLIN HFA) 108 (90 BASE) MCG/ACT Inhaler     meclizine (ANTIVERT) 25 MG tablet     botulinum toxin type A (BOTOX) 100 UNITS injection     Lactase (LACTAID PO)     ONABOTULINUMTOXINA IJ      metaxalone (SKELAXIN) 800 MG tablet     ondansetron (ZOFRAN-ODT) 8 MG ODT tab     aspirin (ASPIRIN CHILDRENS) 81 MG chewable tablet     EPINEPHrine (EPIPEN 2-EAMON) 0.3 MG/0.3ML injection     Colchicine 0.6 MG CAPS     OnabotulinumtoxinA (BOTOX IJ)     norgestimate-ethinyl estradiol (ORTHO-CYCLEN, SPRINTEC) 0.25-35 MG-MCG per tablet     OnabotulinumtoxinA (BOTOX IJ)     SUMAtriptan (IMITREX) 100 MG tablet     promethazine (PHENERGAN) 25 MG tablet     Magic Mouthwash (FV std formula) lidocaine visc 2% 2.5mL/5mL & maalox/mylanta w/ simeth 2.5mL/5mL & diphenhydrAMINE 5mg/5mL     clobetasol (TEMOVATE) 0.05 % cream     OnabotulinumtoxinA (BOTOX IJ)     hyoscyamine (ANASPAZ,LEVSIN) 0.125 MG tablet     Aspirin-Acetaminophen-Caffeine (EXCEDRIN MIGRAINE PO)     hypromellose (GENTEAL) 0.3 % SOLN     betamethasone valerate (VALISONE) 0.1 % cream     carbamide peroxide (DEBROX) 6.5 % otic solution     lidocaine (XYLOCAINE) 2 % jelly     triamcinolone (KENALOG) 0.1 % cream     Ranitidine HCl (RANITIDINE 75 PO)        Allergies   Allergen Reactions     Amoxil [Penicillins] Rash     Dad unsure of reaction.     Bee Venom Anaphylaxis     Contrast Dye Rash     Contrast Media Ready-Box [facility], 04/09/2014.; Contrast Media Ready-Box [facility], 04/09/2014.  NOTE: this is a contrast media oral with iodine. Premedicate with methylpred standard for IV contrast, request barium contrast for oral contrast.     Kiwi Swelling     Orange Fruit [Citrus] Anaphylaxis     Pineapple Anaphylaxis, Difficulty breathing and Rash     Milk Protein Extract Hives     Reglan [Metoclopramide] Other (See Comments)     IV dose only, in ER, rapid heart rate.     Ace Inhibitors      Difficulty in breathing and GI upset     Amitiza [Lubiprostone] Nausea and Vomiting     Amoxicillin-Pot Clavulanate      Latex      Midazolam Unknown     parent states that when pt takes this medication, she wakes up being very violent .     Nuts      Contrast Media Ready-Box [facility], 04/09/2014.;  "Contrast Media Ready-Box [facility], 04/09/2014.       Versed      Coming out of pelvic exam at age of 5, was kicking and screaming when coming out of the versed.     Adhesive Tape Rash     Azithromycin Hives and Rash     Cephalexin Itching and Rash     Itchy mouth     Keflex [Cephalexin-Fd&C Yellow #6] Hives      I have reviewed the Medications, Allergies, Past Medical and Surgical History, and Social History in the Epic system.    Review of Systems   Constitutional: Positive for activity change (some difficulty ambulating left knee pain). Negative for fever.   HENT: Negative for congestion.    Eyes: Negative for redness.   Respiratory: Negative for shortness of breath.    Cardiovascular: Negative for chest pain.   Gastrointestinal: Negative for abdominal pain.   Genitourinary: Negative for difficulty urinating.   Musculoskeletal: Positive for arthralgias, gait problem and joint swelling. Negative for neck stiffness.        Positive for left knee pain.   Skin: Negative for color change and rash.   Neurological: Negative for weakness, numbness and headaches.   Hematological: Does not bruise/bleed easily.   Psychiatric/Behavioral: Negative for confusion, decreased concentration and dysphoric mood.   All other systems reviewed and are negative.      Physical Exam   BP: 122/83  Heart Rate: 115  Temp: 98.1  F (36.7  C)  Resp: 18  Height: 160 cm (5' 3\")  Weight: 65.8 kg (145 lb)  SpO2: 100 %  Physical Exam   Constitutional: She is oriented to person, place, and time. She appears well-developed and well-nourished. She appears distressed.   Patient with father.  Patient mild distress at this point.  Requesting a towel to place underneath   HENT:   Head: Normocephalic and atraumatic.   Eyes: EOM are normal. Pupils are equal, round, and reactive to light. No scleral icterus.   Neck: Normal range of motion. Neck supple.   Cardiovascular: Regular rhythm.    Pulmonary/Chest: No stridor. No respiratory distress.   Abdominal: There is " no guarding.   Musculoskeletal: She exhibits edema and tenderness.   Left knee with mild effusion noted.  There is some difficulty with leg straightening with some weakness noted.  Previous surgical scars noted no sign of infection no marked warmth noted.  Distal pulse intact   Neurological: She is alert and oriented to person, place, and time. She has normal reflexes. No cranial nerve deficit. Coordination normal.   Skin: Skin is warm and dry. No rash noted. She is not diaphoretic. No erythema. No pallor.   Psychiatric: She has a normal mood and affect. Her behavior is normal. Judgment and thought content normal.   Nursing note and vitals reviewed.      ED Course     ED Course     Procedures     Patient evaluated in ER.  MRI scan was ordered for evaluation patient father agree.      9:37 AM  The patient was seen and examined by Dr. Edward in Room 11.             Critical Care time:  none               Labs Ordered and Resulted from Time of ED Arrival Up to the Time of Departure from the ED - No data to display         Assessments & Plan (with Medical Decision Making)         I have reviewed the nursing notes.    I have reviewed the findings, diagnosis, plan and need for follow up with the patient.    New Prescriptions    No medications on file       Final diagnoses:   None     IGarrick, am serving as a trained medical scribe to document services personally performed by Moustapha Edward MD, based on the provider's statements to me.   Moustapha ROJO MD, was physically present and have reviewed and verified the accuracy of this note documented by Garrick Miller.     7/31/2017   [facility], [place], EMERGENCY DEPARTMENT    /This note was created at least in part by the use of dragon voice dictation system. Inadvertent typographical errors may still exist.  Moustapha Edward MD.         Moustapha Edward MD  07/31/17 [id number]

## 2017-07-31 NOTE — ED AVS SNAPSHOT
[facility], Emergency Department    [address] [address]    Phone:  [phone]                                       Samara Oropeza   MRN: [id number]    Department:  [facility], Emergency Department   Date of Visit:  7/31/2017           Patient Information     Date Of Birth          1995        Your diagnoses for this visit were:     Acute pain of left knee     Chronic pain syndrome        You were seen by Moustapha Edward MD.      Follow-up Information     Call Jennifer Acevedo MD.    Specialty:  Orthopedics    Contact information:     PHYSICIANS  [address] [address]  [phone]          Discharge Instructions       Home.  MRI results noted:  Results for orders placed or performed during the hospital encounter of 07/31/17   MR Knee Left w/o Contrast    Narrative    EXAMINATION: MRI of the left knee without contrast.    DATE:  7/31/2017.    HISTORY: Pain.    TECHNIQUE: Multiplanar, multisequence MR imaging of the left knee was  obtained using standard sequences in 3 orthogonal planes without the  use of intravenous or intra-articular gadolinium contrast.    Comparison:  Comparison MRI dated 11/12/2016. was reviewed.    FINDINGS:    In the medial compartment, the medial meniscus is intact. There is no  high-grade or full-thickness cartilage loss or subchondral edema.    In the lateral compartment, there is unchanged mild signal in the  anterior horn of the lateral meniscus without discrete tear. There is  no high-grade or full-thickness cartilage loss or subchondral edema.    In the patellofemoral compartment, there is no high-grade or  full-thickness cartilage loss or subchondral edema. Post surgical  changes of prior surgery in the region of the medial patellar  retinaculum/medial patellofemoral ligament with artifact noted. Mild  lateral patellar tilt. There is patella will with an IS ratio of 1.4.  Mild soft tissue edema in the superolateral  aspect of Hoffa's fat.    The anterior and posterior cruciate ligaments are intact.    The tibial collateral ligament is intact. The anterior iliotibial  band, fibular collateral ligament, biceps femoris tendon, and  popliteus tendons are intact.     There is no significant joint effusion.    The extensor mechanism is intact and normal in appearance. No loose  bodies are seen.       Impression    IMPRESSION:  1. Postsurgical changes in the region of the left knee medial patellar  retinaculum/medial patellofemoral ligament.  2. Findings consistent with patellofemoral maltracking abnormalities  in the left knee, including patella will, mild lateral patellar tilt  and narrowing, as well as soft tissue edema in the superolateral  aspect of Hoffa's fat. No full-thickness cartilage loss.  3. Unchanged mild signal in the region of the anterior horn of the  lateral meniscus.  4. The anterior and posterior cruciate ligaments, medial and lateral  supporting structures, and bilateral menisci are otherwise intact.     KADI BLUE MD     Contact Dr. Acevedo regarding MRI and your symptoms and her recommendations of what to do next?  Home pain medications.  Return if concerns.        Future Appointments        Provider Department Dept Phone Center    8/1/2017 2:30 PM Layla Browne [facility] [phone] [facility]    8/1/2017 4:00 PM Jennifer Acevedo MD [facility] [phone] [facility]    8/3/2017 8:30 AM Emily Rollins PT [facility] [place] [phone] [facility]    8/3/2017 11:00 AM Kimo Hudson LP [facility] [phone] [facility]    8/8/2017 10:30 AM Layla Browne [facility] [phone] [facility]    8/10/2017 10:00 AM Emily Rollins PT [facility] [place] [phone] [facility]    8/16/2017 2:45 PM Cata Hurst NP [facility]  [place] [phone] [place]    8/17/2017 11:00 AM Kimo Hudson LP [facility] [phone] [facility]    8/17/2017 1:45 PM Emily Rollins, PT [facility] [phone] [facility]    8/22/2017 11:00 AM Austen Marquez MD [facility] [phone]     8/31/2017 10:00 AM Emily Rollins, PT [facility] [phone] [facility]    9/13/2017 3:00 PM Frederick Bender MD [facility] [phone] [facility]    10/11/2017 1:00 PM EVI Vizcaino [facility] and [phone] [facility]      24 Hour Appointment Hotline       To make an appointment at any [facility], call [phone] ([phone]). If you don't have a family doctor or clinic, we will help you find one. [facility] are conveniently located to serve the needs of you and your family.          ED Discharge Orders     Knee immobilizer                    Review of your medicines      Our records show that you are taking the medicines listed below. If these are incorrect, please call your family doctor or clinic.        Dose / Directions Last dose taken    albuterol 108 (90 BASE) MCG/ACT Inhaler   Commonly known as:  PROAIR HFA/PROVENTIL HFA/VENTOLIN HFA   Dose:  2 puff   Quantity:  1 Inhaler        Inhale 2 puffs into the lungs every 6 hours as needed for shortness of breath / dyspnea or wheezing   Refills:  1        amitriptyline 25 MG tablet   Commonly known as:  ELAVIL   Dose:  25 mg   Quantity:  45 tablet        Take 1 tablet (25 mg) by mouth At Bedtime   Refills:  5        apremilast 30 MG tablet   Commonly known as:  OTEZLA   Quantity:  60 tablet        20 mg in AM and 30mg in PM daily X 2 weeks and then 30mg twice daily.   Refills:  3        ASPIRIN CHILDRENS 81 MG chewable tablet   Dose:  81 mg   Generic drug:  aspirin        Take 81 mg by mouth daily   Refills:  0        BENADRYL 25 MG tablet   Dose:  25  mg   Generic drug:  diphenhydrAMINE        Take 25 mg by mouth nightly as needed for itching or allergies   Refills:  0        betamethasone valerate 0.1 % cream   Commonly known as:  VALISONE        Apply topically 2 times daily   Refills:  0        * botulinum toxin type A 100 UNITS injection   Commonly known as:  BOTOX   Dose:  200 Units   Quantity:  200 Units        Inject 200 Units into the muscle every 3 months   Refills:  3        * BOTOX IJ   Dose:  150 Units        Inject 150 Units into the muscle once Lot: /C3 Exp: 05/2019   Refills:  0        * BOTOX IJ   Dose:  150 Units        Inject 150 Units into the muscle Lot # /[id number]  Exp: 8/2019   Refills:  0        * BOTOX IJ   Dose:  162.5 Units        Inject 162.5 Units as directed once Lot #: /C3 Exp: 10/2019   Refills:  0        * ONABOTULINUMTOXINA IJ   Dose:  165 Units        Inject 165 Units as directed once Lot # /C3 Expiration Date: 01/2020   Refills:  0        busPIRone 10 MG tablet   Commonly known as:  BUSPAR   Quantity:  60 tablet        Start 1/2 tablet by mouth two times per day for one week, then increase to 1 tablet by mouth two times per day. For anxiety.   Refills:  1        carbamide peroxide 6.5 % otic solution   Commonly known as:  DEBROX   Dose:  5 drop        5 drops 2 times daily   Refills:  0        clobetasol 0.05 % cream   Commonly known as:  TEMOVATE   Quantity:  60 g        Apply topically 2 times daily   Refills:  0        Colchicine 0.6 MG Caps   Dose:  1 capsule   Quantity:  90 capsule        Take 0.6 mg by mouth See Admin Instructions 2 capsules in AM and 1 capsule in PM   Refills:  3        dexamethasone 4 MG/ML injection   Commonly known as:  DECADRON   Dose:  4 mg   Quantity:  30 mL        Apply 1 mL (4 mg) topically as needed   Refills:  0        diclofenac 1 % Gel topical gel   Commonly known as:  VOLTAREN   Quantity:  100 g        Apply 2-4 grams to affected area(s) up to 4 times per day as needed. This is  an anti-inflammatory medication.   Refills:  4        dicyclomine 20 MG tablet   Commonly known as:  BENTYL   Dose:  20 mg   Quantity:  60 tablet        Take 1 tablet (20 mg) by mouth 4 times daily as needed   Refills:  1        EPINEPHrine 0.3 MG/0.3ML injection 2-pack   Commonly known as:  EPIPEN 2-EAMON   Dose:  0.3 mg   Quantity:  0.6 mL        Inject 0.3 mLs (0.3 mg) into the muscle as needed for anaphylaxis   Refills:  3        EXCEDRIN MIGRAINE PO        Take by mouth as needed   Refills:  0        guanFACINE 1 MG tablet   Commonly known as:  TENEX   Quantity:  180 tablet        Take 3 tablets (3 mg) by mouth twice daily in the morning and evening daily.   Refills:  3        hyoscyamine 0.125 MG tablet   Commonly known as:  ANASPAZ/LEVSIN   Dose:  0.125-0.25 mg   Quantity:  40 tablet        Take 1-2 tablets (125-250 mcg) by mouth every 4 hours as needed for cramping   Refills:  1        hypromellose 0.3 % Soln ophthalmic solution   Commonly known as:  GENTEAL   Dose:  1 drop        1 drop every hour as needed   Refills:  0        LACTAID PO        Take by mouth daily   Refills:  0        lactulose 20 GM/30ML Soln   Dose:  30 mL   Quantity:  300 mL        Take 30 mLs by mouth 3 times daily as needed   Refills:  0        lidocaine 2 % topical gel   Commonly known as:  XYLOCAINE   Dose:  1 Tube        Apply 1 Tube topically daily Reported on 4/21/2017   Refills:  0        lidocaine visc 2% 2.5mL/5mL & maalox/mylanta w/ simeth 2.5mL/5mL & diphenhydrAMINE 5mg/5mL Susp suspension   Commonly known as:  MAGIC Mouthwash HOSPITAL   Dose:  10 mL   Quantity:  1 Bottle        Swish and swallow 10 mLs in mouth every 6 hours as needed for mouth sores   Refills:  1        linaclotide 290 MCG capsule   Commonly known as:  LINZESS   Dose:  290 mcg   Quantity:  90 capsule        Take 1 capsule (290 mcg) by mouth every morning (before breakfast)   Refills:  1        LORazepam 1 MG tablet   Commonly known as:  ATIVAN   Dose:  0.5  mg   Quantity:  90 tablet        Take 0.5 tablets (0.5 mg) by mouth 2 times daily as needed for other (abdominal pain) Do not operate a vehicle after taking this medication   Refills:  0        meclizine 25 MG tablet   Commonly known as:  ANTIVERT   Dose:  25 mg   Quantity:  30 tablet        Take 1 tablet (25 mg) by mouth every 6 hours as needed for dizziness   Refills:  1        metaxalone 800 MG tablet   Commonly known as:  SKELAXIN   Dose:  400 mg   Quantity:  30 tablet        Take 0.5 tablets (400 mg) by mouth 3 times daily as needed for moderate pain   Refills:  1        norgestimate-ethinyl estradiol 0.25-35 MG-MCG per tablet   Commonly known as:  ORTHO-CYCLEN, SPRINTEC   Dose:  1 tablet   Quantity:  84 tablet        Take 1 tablet by mouth daily   Refills:  3        omeprazole 40 MG capsule   Commonly known as:  priLOSEC   Dose:  40 mg   Quantity:  90 capsule        Take 1 capsule (40 mg) by mouth daily   Refills:  3        ondansetron 8 MG ODT tab   Commonly known as:  ZOFRAN-ODT   Dose:  8 mg   Quantity:  60 tablet        Take 1 tablet (8 mg) by mouth every 8 hours as needed for nausea   Refills:  11        promethazine 25 MG tablet   Commonly known as:  PHENERGAN   Dose:  12.5-25 mg   Quantity:  20 tablet        Take 0.5-1 tablets (12.5-25 mg) by mouth every 6 hours as needed for nausea   Refills:  1        RANITIDINE 75 PO        Take  by mouth. As needed for GI upset   Refills:  0        sucralfate 1 GM/10ML suspension   Commonly known as:  CARAFATE   Dose:  1 g   Quantity:  1200 mL        Take 10 mLs (1 g) by mouth 4 times daily   Refills:  2        SUMAtriptan 100 MG tablet   Commonly known as:  IMITREX   Dose:  100 mg   Quantity:  18 tablet        Take 1 tablet (100 mg) by mouth at onset of headache for migraine May repeat in 2 hours. Max 2 tablets/24 hours.   Refills:  3        triamcinolone 0.1 % cream   Commonly known as:  KENALOG   Quantity:  15 g        Apply sparingly to oral ulcers three times  "daily for 14 days as needed.   Refills:  1        * Notice:  This list has 5 medication(s) that are the same as other medications prescribed for you. Read the directions carefully, and ask your doctor or other care provider to review them with you.            Procedures and tests performed during your visit     MR Knee Left w/o Contrast      Orders Needing Specimen Collection     None      Pending Results     No orders found from 2017 to 2017.            Pending Culture Results     No orders found from 2017 to 2017.            Pending Results Instructions     If you had any lab results that were not finalized at the time of your Discharge, you can call the ED Lab Result RN at [phone]. You will be contacted by this team for any positive Lab results or changes in treatment. The nurses are available 7 days a week from 10A to 6:30P.  You can leave a message 24 hours per day and they will return your call.        Thank you for choosing [facility]       Thank you for choosing [facility] for your care. Our goal is always to provide you with excellent care. Hearing back from our patients is one way we can continue to improve our services. Please take a few minutes to complete the written survey that you may receive in the mail after you visit with us. Thank you!        [employer] Information     [employer] lets you send messages to your doctor, view your test results, renew your prescriptions, schedule appointments and more. To sign up, go to [email or web address] Click on \"Log in\" on the left side of the screen, which will take you to the Welcome page. Then click on \"Sign up Now\" on the right side of the page.     You will be asked to enter the access code listed below, as well as some personal information. Please follow the directions to create your username and password.     Your access code is: [email or web address]  Expires: 10/29/2017 10:26 AM     Your access code will  in 90 days. If you need help or a " new code, please call your [place] clinic or [phone].        Care EveryWhere ID     This is your Care EveryWhere ID. This could be used by other organizations to access your [place] medical records  [id number]        Equal Access to Services     NABIL GALEANO : Brandon Santiago, waluigida luqadaha, qaybta kaalmada yessica, mike parsons. So [facility] [phone].    ATENCIÓN: Si habla español, tiene a livingston disposición servicios gratuitos de asistencia lingüística. Llame al [phone].    We comply with applicable federal civil rights laws and [place] laws. We do not discriminate on the basis of race, color, national origin, age, disability sex, sexual orientation or gender identity.            After Visit Summary       This is your record. Keep this with you and show to your community pharmacist(s) and doctor(s) at your next visit.

## 2017-07-31 NOTE — DISCHARGE INSTRUCTIONS
Home.  MRI results noted:  Results for orders placed or performed during the hospital encounter of 07/31/17   MR Knee Left w/o Contrast    Narrative    EXAMINATION: MRI of the left knee without contrast.    DATE:  7/31/2017.    HISTORY: Pain.    TECHNIQUE: Multiplanar, multisequence MR imaging of the left knee was  obtained using standard sequences in 3 orthogonal planes without the  use of intravenous or intra-articular gadolinium contrast.    Comparison:  Comparison MRI dated 11/12/2016. was reviewed.    FINDINGS:    In the medial compartment, the medial meniscus is intact. There is no  high-grade or full-thickness cartilage loss or subchondral edema.    In the lateral compartment, there is unchanged mild signal in the  anterior horn of the lateral meniscus without discrete tear. There is  no high-grade or full-thickness cartilage loss or subchondral edema.    In the patellofemoral compartment, there is no high-grade or  full-thickness cartilage loss or subchondral edema. Post surgical  changes of prior surgery in the region of the medial patellar  retinaculum/medial patellofemoral ligament with artifact noted. Mild  lateral patellar tilt. There is patella will with an IS ratio of 1.4.  Mild soft tissue edema in the superolateral aspect of Hoffa's fat.    The anterior and posterior cruciate ligaments are intact.    The tibial collateral ligament is intact. The anterior iliotibial  band, fibular collateral ligament, biceps femoris tendon, and  popliteus tendons are intact.     There is no significant joint effusion.    The extensor mechanism is intact and normal in appearance. No loose  bodies are seen.       Impression    IMPRESSION:  1. Postsurgical changes in the region of the left knee medial patellar  retinaculum/medial patellofemoral ligament.  2. Findings consistent with patellofemoral maltracking abnormalities  in the left knee, including patella will, mild lateral patellar tilt  and narrowing, as well as  soft tissue edema in the superolateral  aspect of Hoffa's fat. No full-thickness cartilage loss.  3. Unchanged mild signal in the region of the anterior horn of the  lateral meniscus.  4. The anterior and posterior cruciate ligaments, medial and lateral  supporting structures, and bilateral menisci are otherwise intact.     KADI BLUE MD     Contact Dr. Acevedo regarding MRI and your symptoms and her recommendations of what to do next?  Home pain medications.  Return if concerns.

## 2017-07-31 NOTE — LETTER
[facility], [place], EMERGENCY DEPARTMENT  [address] [address]  [phone]      July 31, 2017      To Whom it may concern:    _________________________ was in our Emergency Department today, July 31, 2017, with a patient who needed their assistance.  Please excuse them from work/school.      Sincerely,        Moustapha Edward MD

## 2017-07-31 NOTE — TELEPHONE ENCOUNTER
PPC  --    Patient was seen for consultation on 05/10/17 (seen only once by me). She is currently working with Kimo Hudson and Emily Rollins. She will need to schedule follow up with new pain provider for ongoing management; please facilitate 60 minute visit for transfer of care. Consider EVI Lobato CNP around end of August/September (or first available thereafter).     CC: Emily Rollins, Kimo Hudson - AMANDA    Thanks,  Natalie Cha MD  [facility]

## 2017-07-31 NOTE — ED AVS SNAPSHOT
[facility], Emergency Department    [address]    [facility] [address]    Phone:  [phone]    Fax:  [phone]                                       Samara Oropeza   MRN: [id number]    Department:  [facility], Emergency Department   Date of Visit:  7/31/2017           Patient Information     Date Of Birth          1995        Your diagnoses for this visit were:     Allergic reaction, initial encounter        You were seen by Lizbet Astudillo MD.        Discharge Instructions       Thank you for coming to the [facility] Emergency Department.     Continue to avoid foods that trigger symptoms. Consider avoiding strawberries in the future.     Take prednisone 40mg daily for 4 days, ranitidine 150mg twice daily for 4 days and benadryl 25mg every 6 hours for the next 12 hours, then as needed.     Return to the ER for:  Worsening swelling of the face, lips or tongue  Trouble breathing    Future Appointments        Provider Department Dept Phone Center    8/1/2017 2:30 PM Layla Browne [facility] [phone] [facility]    8/1/2017 4:00 PM Jennifer Acevedo MD [facility] Orthopa[facility] [phone] [facility]    8/3/2017 8:30 AM Emily Rollins PT [facility] [phone] [facility]    8/3/2017 11:00 AM Kimo Hudson LP [facility] [phone] [facility]    8/8/2017 10:30 AM Layla Browne [facility] [phone] [facility]    8/10/2017 10:00 AM Emily Rollins PT [facility] [phone] [facility]    8/16/2017 2:45 PM Cata Hurst NP [facility] [phone] [place]    8/17/2017 11:00 AM Kimo Hudson LP [facility] [phone] [facility]    8/17/2017 1:45 PM Emily Rollins PT [facility] [phone] [facility]    8/22/2017 11:00 AM Austen  Valdez Marquez MD [facility] [phone]     8/31/2017 10:00 AM Emily Rollins, PT [facility] [phone] [facility]    9/13/2017 3:00 PM Frederick Bender MD [facility] Medicine [facility] Rehabilitation [phone] [facility]    10/11/2017 1:00 PM EVI Vizcaino CNP [facility] Gastroenterology and [phone] [facility]      24 Hour Appointment Hotline       To make an appointment at any [facility], call [phone] ([phone]). If you don't have a family doctor or clinic, we will help you find one. [facility] are conveniently located to serve the needs of you and your family.             Review of your medicines      START taking        Dose / Directions Last dose taken    predniSONE 20 MG tablet   Commonly known as:  DELTASONE   Quantity:  10 tablet        Take two tablets (= 40mg) each day for 5 (five) days   Refills:  0          CONTINUE these medicines which may have CHANGED, or have new prescriptions. If we are uncertain of the size of tablets/capsules you have at home, strength may be listed as something that might have changed.        Dose / Directions Last dose taken    diphenhydrAMINE 25 MG tablet   Commonly known as:  BENADRYL   Dose:  25 mg   What changed:    - when to take this  - reasons to take this   Quantity:  30 tablet        Take 1 tablet (25 mg) by mouth every 8 hours as needed for allergies   Refills:  0        ranitidine 150 MG tablet   Commonly known as:  ZANTAC   Dose:  150 mg   What changed:    - medication strength  - how much to take  - when to take this  - additional instructions   Quantity:  8 tablet        Take 1 tablet (150 mg) by mouth 2 times daily for 4 days   Refills:  0          Our records show that you are taking the medicines listed below. If these are incorrect, please call your family doctor or clinic.        Dose / Directions Last dose taken    albuterol 108 (90 BASE) MCG/ACT Inhaler   Commonly  known as:  PROAIR HFA/PROVENTIL HFA/VENTOLIN HFA   Dose:  2 puff   Quantity:  1 Inhaler        Inhale 2 puffs into the lungs every 6 hours as needed for shortness of breath / dyspnea or wheezing   Refills:  1        amitriptyline 25 MG tablet   Commonly known as:  ELAVIL   Dose:  25 mg   Quantity:  45 tablet        Take 1 tablet (25 mg) by mouth At Bedtime   Refills:  5        apremilast 30 MG tablet   Commonly known as:  OTEZLA   Quantity:  60 tablet        20 mg in AM and 30mg in PM daily X 2 weeks and then 30mg twice daily.   Refills:  3        ASPIRIN CHILDRENS 81 MG chewable tablet   Dose:  81 mg   Generic drug:  aspirin        Take 81 mg by mouth daily   Refills:  0        betamethasone valerate 0.1 % cream   Commonly known as:  VALISONE        Apply topically 2 times daily   Refills:  0        * botulinum toxin type A 100 UNITS injection   Commonly known as:  BOTOX   Dose:  200 Units   Quantity:  200 Units        Inject 200 Units into the muscle every 3 months   Refills:  3        * BOTOX IJ   Dose:  150 Units        Inject 150 Units into the muscle once Lot: /C3 Exp: 05/2019   Refills:  0        * BOTOX IJ   Dose:  150 Units        Inject 150 Units into the muscle Lot # /C3  Exp: 8/2019   Refills:  0        * BOTOX IJ   Dose:  162.5 Units        Inject 162.5 Units as directed once Lot #: /C3 Exp: 10/2019   Refills:  0        * ONABOTULINUMTOXINA IJ   Dose:  165 Units        Inject 165 Units as directed once Lot # /C3 Expiration Date: 01/2020   Refills:  0        busPIRone 10 MG tablet   Commonly known as:  BUSPAR   Quantity:  60 tablet        Start 1/2 tablet by mouth two times per day for one week, then increase to 1 tablet by mouth two times per day. For anxiety.   Refills:  1        carbamide peroxide 6.5 % otic solution   Commonly known as:  DEBROX   Dose:  5 drop        5 drops 2 times daily   Refills:  0        clobetasol 0.05 % cream   Commonly known as:  TEMOVATE   Quantity:  60 g         Apply topically 2 times daily   Refills:  0        Colchicine 0.6 MG Caps   Dose:  1 capsule   Quantity:  90 capsule        Take 0.6 mg by mouth See Admin Instructions 2 capsules in AM and 1 capsule in PM   Refills:  3        dexamethasone 4 MG/ML injection   Commonly known as:  DECADRON   Dose:  4 mg   Quantity:  30 mL        Apply 1 mL (4 mg) topically as needed   Refills:  0        diclofenac 1 % Gel topical gel   Commonly known as:  VOLTAREN   Quantity:  100 g        Apply 2-4 grams to affected area(s) up to 4 times per day as needed. This is an anti-inflammatory medication.   Refills:  4        dicyclomine 20 MG tablet   Commonly known as:  BENTYL   Dose:  20 mg   Quantity:  60 tablet        Take 1 tablet (20 mg) by mouth 4 times daily as needed   Refills:  1        EPINEPHrine 0.3 MG/0.3ML injection 2-pack   Commonly known as:  EPIPEN 2-EAMON   Dose:  0.3 mg   Quantity:  0.6 mL        Inject 0.3 mLs (0.3 mg) into the muscle as needed for anaphylaxis   Refills:  3        EXCEDRIN MIGRAINE PO        Take by mouth as needed   Refills:  0        guanFACINE 1 MG tablet   Commonly known as:  TENEX   Quantity:  180 tablet        Take 3 tablets (3 mg) by mouth twice daily in the morning and evening daily.   Refills:  3        hyoscyamine 0.125 MG tablet   Commonly known as:  ANASPAZ/LEVSIN   Dose:  0.125-0.25 mg   Quantity:  40 tablet        Take 1-2 tablets (125-250 mcg) by mouth every 4 hours as needed for cramping   Refills:  1        hypromellose 0.3 % Soln ophthalmic solution   Commonly known as:  GENTEAL   Dose:  1 drop        1 drop every hour as needed   Refills:  0        LACTAID PO        Take by mouth daily   Refills:  0        lactulose 20 GM/30ML Soln   Dose:  30 mL   Quantity:  300 mL        Take 30 mLs by mouth 3 times daily as needed   Refills:  0        lidocaine 2 % topical gel   Commonly known as:  XYLOCAINE   Dose:  1 Tube        Apply 1 Tube topically daily Reported on 4/21/2017   Refills:  0         lidocaine visc 2% 2.5mL/5mL & maalox/mylanta w/ simeth 2.5mL/5mL & diphenhydrAMINE 5mg/5mL Susp suspension   Commonly known as:  MAGIC Mouthwash HOSPITAL   Dose:  10 mL   Quantity:  1 Bottle        Swish and swallow 10 mLs in mouth every 6 hours as needed for mouth sores   Refills:  1        linaclotide 290 MCG capsule   Commonly known as:  LINZESS   Dose:  290 mcg   Quantity:  90 capsule        Take 1 capsule (290 mcg) by mouth every morning (before breakfast)   Refills:  1        LORazepam 1 MG tablet   Commonly known as:  ATIVAN   Dose:  0.5 mg   Quantity:  90 tablet        Take 0.5 tablets (0.5 mg) by mouth 2 times daily as needed for other (abdominal pain) Do not operate a vehicle after taking this medication   Refills:  0        meclizine 25 MG tablet   Commonly known as:  ANTIVERT   Dose:  25 mg   Quantity:  30 tablet        Take 1 tablet (25 mg) by mouth every 6 hours as needed for dizziness   Refills:  1        metaxalone 800 MG tablet   Commonly known as:  SKELAXIN   Dose:  400 mg   Quantity:  30 tablet        Take 0.5 tablets (400 mg) by mouth 3 times daily as needed for moderate pain   Refills:  1        norgestimate-ethinyl estradiol 0.25-35 MG-MCG per tablet   Commonly known as:  ORTHO-CYCLEN, SPRINTEC   Dose:  1 tablet   Quantity:  84 tablet        Take 1 tablet by mouth daily   Refills:  3        omeprazole 40 MG capsule   Commonly known as:  priLOSEC   Dose:  40 mg   Quantity:  90 capsule        Take 1 capsule (40 mg) by mouth daily   Refills:  3        ondansetron 8 MG ODT tab   Commonly known as:  ZOFRAN-ODT   Dose:  8 mg   Quantity:  60 tablet        Take 1 tablet (8 mg) by mouth every 8 hours as needed for nausea   Refills:  11        promethazine 25 MG tablet   Commonly known as:  PHENERGAN   Dose:  12.5-25 mg   Quantity:  20 tablet        Take 0.5-1 tablets (12.5-25 mg) by mouth every 6 hours as needed for nausea   Refills:  1        sucralfate 1 GM/10ML suspension   Commonly known as:   CARAFATE   Dose:  1 g   Quantity:  1200 mL        Take 10 mLs (1 g) by mouth 4 times daily   Refills:  2        SUMAtriptan 100 MG tablet   Commonly known as:  IMITREX   Dose:  100 mg   Quantity:  18 tablet        Take 1 tablet (100 mg) by mouth at onset of headache for migraine May repeat in 2 hours. Max 2 tablets/24 hours.   Refills:  3        triamcinolone 0.1 % cream   Commonly known as:  KENALOG   Quantity:  15 g        Apply sparingly to oral ulcers three times daily for 14 days as needed.   Refills:  1        * Notice:  This list has 5 medication(s) that are the same as other medications prescribed for you. Read the directions carefully, and ask your doctor or other care provider to review them with you.            Prescriptions were sent or printed at these locations (3 Prescriptions)                   Other Prescriptions                Printed at Department/Unit printer (3 of 3)         predniSONE (DELTASONE) 20 MG tablet               ranitidine (ZANTAC) 150 MG tablet               diphenhydrAMINE (BENADRYL) 25 MG tablet                Procedures and tests performed during your visit     EKG 12 lead      Orders Needing Specimen Collection     None      Pending Results     Date and Time Order Name Status Description    7/31/2017 2305 EKG 12 lead Preliminary             Pending Culture Results     No orders found for last 3 day(s).            Pending Results Instructions     If you had any lab results that were not finalized at the time of your Discharge, you can call the ED Lab Result RN at [phone]. You will be contacted by this team for any positive Lab results or changes in treatment. The nurses are available 7 days a week from 10A to 6:30P.  You can leave a message 24 hours per day and they will return your call.        Thank you for choosing [facility]       Thank you for choosing [facility] for your care. Our goal is always to provide you with excellent care. Hearing back from our patients is one way  "we can continue to improve our services. Please take a few minutes to complete the written survey that you may receive in the mail after you visit with us. Thank you!        [employer] Information     [employer] lets you send messages to your doctor, view your test results, renew your prescriptions, schedule appointments and more. To sign up, go to [email or web address] . Click on \"Log in\" on the left side of the screen, which will take you to the Welcome page. Then click on \"Sign up Now\" on the right side of the page.     You will be asked to enter the access code listed below, as well as some personal information. Please follow the directions to create your username and password.     Your access code is: [email or web address]  Expires: 10/29/2017 10:26 AM     Your access code will  in 90 days. If you need help or a new code, please call your [place] clinic or [phone].        Care EveryWhere ID     This is your Care EveryWhere ID. This could be used by other organizations to access your [place] medical records  [id number]        Equal Access to Services     [facility] : Hadcarlin Santiago, channing hurtado, mike maloney. So [facility] [phone].    ATENCIÓN: Si habla español, tiene a livingston disposición servicios gratuitos de asistencia lingüística. Jesus al [phone].    We comply with applicable federal civil rights laws and [place] laws. We do not discriminate on the basis of race, color, national origin, age, disability sex, sexual orientation or gender identity.            After Visit Summary       This is your record. Keep this with you and show to your community pharmacist(s) and doctor(s) at your next visit.                  "

## 2017-07-31 NOTE — ED NOTES
Presents to triage for knee injury after fall this morning, reports being able to move knee cap this AM after fall. Knee was warm to touch. Has history of knee surgery (ligament damage, floating knee cap, dislocated knee cap) in 2013. Following pain management for generalized pain.

## 2017-08-01 ENCOUNTER — OFFICE VISIT (OUTPATIENT)
Dept: BEHAVIORAL HEALTH | Facility: CLINIC | Age: 22
End: 2017-08-01
Payer: COMMERCIAL

## 2017-08-01 ENCOUNTER — OFFICE VISIT (OUTPATIENT)
Dept: ORTHOPEDICS | Facility: CLINIC | Age: 22
End: 2017-08-01

## 2017-08-01 VITALS
HEART RATE: 103 BPM | TEMPERATURE: 98.2 F | DIASTOLIC BLOOD PRESSURE: 85 MMHG | SYSTOLIC BLOOD PRESSURE: 123 MMHG | OXYGEN SATURATION: 99 % | RESPIRATION RATE: 15 BRPM

## 2017-08-01 DIAGNOSIS — F33.1 MODERATE EPISODE OF RECURRENT MAJOR DEPRESSIVE DISORDER (H): Primary | ICD-10-CM

## 2017-08-01 DIAGNOSIS — F41.1 GAD (GENERALIZED ANXIETY DISORDER): ICD-10-CM

## 2017-08-01 DIAGNOSIS — M22.2X1 PATELLOFEMORAL DISORDERS, RIGHT KNEE: Primary | ICD-10-CM

## 2017-08-01 DIAGNOSIS — M35.2 BEHCET'S DISEASE (H): ICD-10-CM

## 2017-08-01 DIAGNOSIS — F43.10 PTSD (POST-TRAUMATIC STRESS DISORDER): ICD-10-CM

## 2017-08-01 LAB — INTERPRETATION ECG - MUSE: NORMAL

## 2017-08-01 PROCEDURE — 90834 PSYTX W PT 45 MINUTES: CPT | Performed by: SOCIAL WORKER

## 2017-08-01 RX ORDER — PREDNISONE 20 MG/1
TABLET ORAL
Qty: 10 TABLET | Refills: 0 | Status: SHIPPED | OUTPATIENT
Start: 2017-08-01 | End: 2017-08-16

## 2017-08-01 RX ORDER — DIPHENHYDRAMINE HCL 25 MG
25 TABLET ORAL EVERY 8 HOURS PRN
Qty: 30 TABLET | Refills: 0 | Status: ON HOLD | OUTPATIENT
Start: 2017-08-01 | End: 2019-01-02

## 2017-08-01 ASSESSMENT — ANXIETY QUESTIONNAIRES
1. FEELING NERVOUS, ANXIOUS, OR ON EDGE: NEARLY EVERY DAY
IF YOU CHECKED OFF ANY PROBLEMS ON THIS QUESTIONNAIRE, HOW DIFFICULT HAVE THESE PROBLEMS MADE IT FOR YOU TO DO YOUR WORK, TAKE CARE OF THINGS AT HOME, OR GET ALONG WITH OTHER PEOPLE: VERY DIFFICULT
5. BEING SO RESTLESS THAT IT IS HARD TO SIT STILL: MORE THAN HALF THE DAYS
7. FEELING AFRAID AS IF SOMETHING AWFUL MIGHT HAPPEN: MORE THAN HALF THE DAYS
2. NOT BEING ABLE TO STOP OR CONTROL WORRYING: NEARLY EVERY DAY
GAD7 TOTAL SCORE: 18
6. BECOMING EASILY ANNOYED OR IRRITABLE: NEARLY EVERY DAY
3. WORRYING TOO MUCH ABOUT DIFFERENT THINGS: MORE THAN HALF THE DAYS

## 2017-08-01 ASSESSMENT — ENCOUNTER SYMPTOMS
NAUSEA: 1
CONSTIPATION: 1
SYNCOPE: 1
SLEEP DISTURBANCES DUE TO BREATHING: 1
SWOLLEN GLANDS: 1
HYPERTENSION: 1
FEVER: 1
SPUTUM PRODUCTION: 0
DECREASED APPETITE: 1
MYALGIAS: 1
PARALYSIS: 0
JAUNDICE: 0
POSTURAL DYSPNEA: 1
SINUS CONGESTION: 1
ARTHRALGIAS: 1
BRUISES/BLEEDS EASILY: 1
BACK PAIN: 1
LOSS OF CONSCIOUSNESS: 1
EYE REDNESS: 1
INSOMNIA: 1
MUSCLE CRAMPS: 1
TROUBLE SWALLOWING: 1
NIGHT SWEATS: 1
COUGH DISTURBING SLEEP: 0
MUSCLE WEAKNESS: 1
HYPOTENSION: 1
DISTURBANCES IN COORDINATION: 1
WEIGHT GAIN: 0
FATIGUE: 1
HOT FLASHES: 1
NECK PAIN: 1
RESPIRATORY PAIN: 1
SHORTNESS OF BREATH: 1
VOMITING: 1
HEMOPTYSIS: 0
DEPRESSION: 1
CLAUDICATION: 1
DOUBLE VISION: 1
HOARSE VOICE: 1
LIGHT-HEADEDNESS: 1
MEMORY LOSS: 1
SINUS PAIN: 1
NERVOUS/ANXIOUS: 1
BOWEL INCONTINENCE: 0
SNORES LOUDLY: 0
HEADACHES: 1
SORE THROAT: 1
NAIL CHANGES: 1
ALTERED TEMPERATURE REGULATION: 1
ABDOMINAL PAIN: 1
EYE IRRITATION: 1
WHEEZING: 0
TASTE DISTURBANCE: 1
TREMORS: 1
SEIZURES: 1
WEAKNESS: 1
BLOOD IN STOOL: 0
STIFFNESS: 1
TACHYCARDIA: 1
EXERCISE INTOLERANCE: 1
ORTHOPNEA: 1
DECREASED LIBIDO: 0
PANIC: 1
PALPITATIONS: 1
NECK MASS: 0
SMELL DISTURBANCE: 1
COUGH: 1
DIZZINESS: 1
CHILLS: 1
BLOATING: 1
POLYPHAGIA: 0
NUMBNESS: 1
LEG PAIN: 1
EYE WATERING: 1
WEIGHT LOSS: 1
LEG SWELLING: 1
SKIN CHANGES: 0
DIARRHEA: 1
HEARTBURN: 1
SPEECH CHANGE: 1
INCREASED ENERGY: 1
DECREASED CONCENTRATION: 1
JOINT SWELLING: 1
TINGLING: 1
EYE PAIN: 1
RECTAL PAIN: 0
POOR WOUND HEALING: 1
POLYDIPSIA: 1
RECTAL BLEEDING: 0
HALLUCINATIONS: 1
DYSPNEA ON EXERTION: 1

## 2017-08-01 ASSESSMENT — PATIENT HEALTH QUESTIONNAIRE - PHQ9: 5. POOR APPETITE OR OVEREATING: NEARLY EVERY DAY

## 2017-08-01 NOTE — LETTER
8/1/2017       RE: Samara Oropeza  [address]  [place] [address]     Dear Colleague,    Thank you for referring your patient, Samara Oropeza, to the [facility] at [facility]. Please see a copy of my visit note below.    No notes on file    Again, thank you for allowing me to participate in the care of your patient.      Sincerely,    Jennifer Acevedo MD

## 2017-08-01 NOTE — MR AVS SNAPSHOT
MRN:[id number]                      After Visit Summary   8/1/2017    Samara Oropeza    MRN: [id number]           Visit Information        Provider Department      8/1/2017 2:30 PM Layla Browne LICSW [facility] Generic      Your next 10 appointments already scheduled     Aug 01, 2017  4:00 PM CDT   (Arrive by 3:45 PM)   RETURN KNEE with Jennifer Acevedo MD   [facility] ([facility])    [address]  4th Floor  [facility] [id number] [phone]            Aug 03, 2017  8:30 AM CDT   Return Visit with Emily Rollins PT   [facility] ([facility])    [address]  [facility] [address] [phone]            Aug 03, 2017 11:00 AM CDT   Return Visit with Kimo Hudson LP   [facility] [place] ([facility] [place])    [address] [id number] [phone]            Aug 08, 2017 10:30 AM CDT   Return Visit with ALISA Burt   [facility] ([facility])    [address] [id number] [phone]            Aug 10, 2017 10:00 AM CDT   Return Visit with Emily Rollins PT   [facility] ([facility])    [address]  [facility] [address] [phone]            Aug 16, 2017  2:45 PM CDT   Return Visit with Cata Hurst NP   [facility] ([facility])    [address]  [address]  [facility] [address] [phone]            Aug 17, 2017 11:00 AM CDT   Return Visit with Kimo Hudson LP   [facility] [place] ([facility] [place])    [address]  [place] [address] [phone]            Aug 17, 2017  1:45 PM CDT   Return Visit with Emily Rollins PT  "  [facility] ([facility])    [address]  [address]  [facility] [address]   [phone]            Aug 22, 2017 11:00 AM CDT   Return Visit with Austen Marquez MD   [facility]] ([facility][1[facility]    [address] [address]   [phone]            Aug 31, 2017 10:00 AM CDT   Return Visit with Emily Rollins PT   [facility] ([facility])    [address]  [address]  [facility] [address]   [phone]              MyChart Information     [employer] lets you send messages to your doctor, view your test results, renew your prescriptions, schedule appointments and more. To sign up, go to [email or web address] . Click on \"Log in\" on the left side of the screen, which will take you to the Welcome page. Then click on \"Sign up Now\" on the right side of the page.     You will be asked to enter the access code listed below, as well as some personal information. Please follow the directions to create your username and password.     Your access code is: [email or web address]  Expires: 10/29/2017 10:26 AM     Your access code will  in 90 days. If you need help or a new code, please call your [place] clinic or [phone].        Care EveryWhere ID     This is your Care EveryWhere ID. This could be used by other organizations to access your [facility] medical records  [id number]        Equal Access to Services     [facility] : Hadii malcolm ahumada Sotiffany, waaxda luqadaha, qaybta kaalmada yessica, mike rodriguez . So [facility] [phone].    ATENCIÓN: Si habla español, tiene a livingston disposición servicios gratuitos de asistencia lingüística. Llame al [phone].    We comply with applicable federal civil rights laws and [place] laws. We do not discriminate on the basis of race, color, national origin, age, disability sex, sexual orientation or " gender identity.

## 2017-08-01 NOTE — PROGRESS NOTES
HISTORY OF PRESENT ILLNESS:  The patient is a 21-year-old female who is well known to me.  She is status post an MPFL reconstruction in 2013.  She did well for many years, and I was not seeing her.      She returned to see me in October of this year with reports of pain and the sense of instability.  Physical exam revealed a stable kneecap.  There began to be this disconnect between what she was reporting and feeling, and what we were examining objectively.      At that time, we did get an MRI.  The MRI was essentially negative.      She does respond to a tape technique, which includes unloading and lateral-to-medial.  She has been taught this, but she admits that it is hard to do herself.      She is very diligent about doing her physical therapy at home.      She has had a number of other intervening problems.  She has a history of Behcet's disease.  She has a history of gastroparesis, chronic abdominal pain and constipation, GERD, fibromyalgia.      She presented to the Emergency Department yesterday.  She was pretty insistent that her kneecap when out of place.  She reported pain that she could not stand, and could not support her body weight.  She was fairly insistent about getting an MRI.      An MRI was obtained.  The MRI by my eye shows no structural change from the one in October, and more importantly no evidence of an acute injury.  There was no subchondral edema, no knee swelling, no tissue swelling.      Since yesterday, she reports that she has been intermittently using the knee immobilizer, but has been able to bear weight on it.      PHYSICAL EXAMINATION:  A physical exam was again repeated.  She shows good straight leg raising effort; however, extension from a flexed knee position she struggles with in regards to quad activation.  She has no knee swelling.  She has mild hypersensitivity with any movement of the kneecap.  Kneecap shows 2 quadrants lateral mobility with a firm end point.  MPFL is  palpable.      The kneecap tracks well through an active arc of motion.  There is mild crepitus in early extension and flexion.      ASSESSMENT:  I have tried to impart on Samara, who is present here today with her boyfriend and mother, that her kneecap is structurally stable, and I do not have any objective reason that can account for her degree of pain.      In addition, the MRI is very sensitive to any kind of injury pattern, and in the absence of the MRI showing any kind of swelling or edema, the likelihood that she did any serious injury to his kneecap is quite low.      She has benefitted from a tape technique in the past.  We taught her this today, and did report that her knee pain was reduced from a level 6 to a level 3.  We will be available to try to progress her through taping and better functional activities to the degree that she is able to attend.      In the absence of problems, I will continue to be available for consultation, but no planned followup will be done.      Room time:  25 minutes.  Consultation time:  25.

## 2017-08-01 NOTE — MR AVS SNAPSHOT
After Visit Summary   8/1/2017    Samara Oropeza    MRN: [id number]           Patient Information     Date Of Birth          1995        Visit Information        Provider Department      8/1/2017 4:00 PM Jennifer Acevedo MD [facility]        Today's Diagnoses     Patellofemoral disorders, right knee    -  1       Follow-ups after your visit        Additional Services     PHYSICAL THERAPY REFERRAL (Internal)       Physical Therapy Referral                  Your next 10 appointments already scheduled     Aug 10, 2017 10:00 AM CDT   Return Visit with Emily Rollins PT   [facility] ([facility])    [address]  [facility] [address] [phone]            Aug 15, 2017  1:50 PM CDT   (Arrive by 1:35 PM)   LUIZ Extremity with Jennifer Alvarenga PT   [facility] ([facility])    [address] [place] [id number] [phone]            Aug 16, 2017  2:45 PM CDT   Return Visit with Cata Hurst NP   [facility] ([facility])    [address]  [address]  [facility] [id number] [phone]            Aug 17, 2017 11:00 AM CDT   Return Visit with Kimo Hudson LP   [facility] ([facility])    [address] [facility] [id number] [phone]            Aug 17, 2017  1:45 PM CDT   Return Visit with Emily Rollins PT   [facility] ([facility])    [address]  [facility] [id number] [phone]            Aug 22, 2017 11:00 AM CDT   Return Visit with Austen Marquez MD   [facility] ([facility])    [address] [id number] [phone]            Aug 25, 2017  2:50 PM CDT   LUIZ Extremity with Jennifer Alvarenga PT     [facility] ([facility])    [address] [facility] [address]  [facility] [address] [phone]            Aug 31, 2017  9:00 AM CDT   Return Visit with EVI Dahl CNP   [facility] ([facility])    [address]  [facility] [address] [phone]            Aug 31, 2017 10:00 AM CDT   Return Visit with Emily Rollins PT   [facility] ([facility])    [address]  [facility] [address] [phone]            Sep 05, 2017 12:30 PM CDT   Return Visit with ALISA Burt   [facility] ([facility])    [address] [id number] [phone]              Who to contact     Please call your clinic at [phone] to:    Ask questions about your health    Make or cancel appointments    Discuss your medicines    Learn about your test results    Speak to your doctor   If you have compliments or concerns about an experience at your clinic, or if you wish to file a complaint, please contact [facility] Patient Relations at [phone] or email us at [email or web address]         Additional Information About Your Visit        MyChart Information     (In[employer] is an electronic gateway that provides easy, online access to your medical records. With [employer], you can request a clinic appointment, read your test results, renew a prescription or communicate with your care team.     To sign up for (In[employer] visit the website at [email or web address]   You will be asked to enter the access code listed below, as well as some personal information. Please follow the directions to create your username and password.     Your access code is: [email or web address]  Expires: 10/29/2017 10:26 AM     Your access code will  in 90 days. If you need help or  a new code, please contact your [facility] or call [phone] for assistance.        Care EveryWhere ID     This is your Care EveryWhere ID. This could be used by other organizations to access your [facility] medical records  [id number]         Blood Pressure from Last 3 Encounters:   No data found for BP    Weight from Last 3 Encounters:   No data found for Wt              Today, you had the following     No orders found for display       Primary Care Provider Office Phone # Fax #    Sonja Abreu, EVI [facility] [phone]       [address] [facility] [address]  [facility] [address]        Equal Access to Services     [facility]: Hadii aad ku hadasho Soomaali, waaxda luqadaha, qaybta kaalmada adeegyada, waxjarred idiin hayaan adeeg royce rodriguez . So [facility] [phone].    ATENCIÓN: Si habla español, tiene a livingston disposición servicios gratuitos de asistencia lingüística. Llame al [phone].    We comply with applicable federal civil rights laws and [place] laws. We do not discriminate on the basis of race, color, national origin, age, disability sex, sexual orientation or gender identity.            Thank you!     Thank you for choosing [facility]  for your care. Our goal is always to provide you with excellent care. Hearing back from our patients is one way we can continue to improve our services. Please take a few minutes to complete the written survey that you may receive in the mail after your visit with us. Thank you!             Your Updated Medication List - Protect others around you: Learn how to safely use, store and throw away your medicines at www.disposemymeds.org.          This list is accurate as of: 8/1/17 11:59 PM.  Always use your most recent med list.                   Brand Name Dispense Instructions for use Diagnosis    albuterol 108 (90 BASE) MCG/ACT Inhaler    PROAIR HFA/PROVENTIL HFA/VENTOLIN HFA    1 Inhaler    Inhale 2 puffs into the  lungs every 6 hours as needed for shortness of breath / dyspnea or wheezing    Atypical chest pain       amitriptyline 25 MG tablet    ELAVIL    45 tablet    Take 1 tablet (25 mg) by mouth At Bedtime    Atypical chest pain, Insomnia, unspecified type       apremilast 30 MG tablet    OTEZLA    60 tablet    20 mg in AM and 30mg in PM daily X 2 weeks and then 30mg twice daily.    Behcet's disease (H)       ASPIRIN CHILDRENS 81 MG chewable tablet   Generic drug:  aspirin      Take 81 mg by mouth as needed        betamethasone valerate 0.1 % cream    VALISONE     Apply topically 2 times daily        * botulinum toxin type A 100 UNITS injection    BOTOX    200 Units    Inject 200 Units into the muscle every 3 months    Cervical dystonia       * BOTOX IJ      Inject 150 Units into the muscle once Lot: /C3 Exp: 05/2019        * BOTOX IJ      Inject 150 Units into the muscle Lot # /C3  Exp: 8/2019        * BOTOX IJ      Inject 162.5 Units as directed once Lot #: /C3 Exp: 10/2019        * ONABOTULINUMTOXINA IJ      Inject 165 Units as directed once Lot # /C3 Expiration Date: 01/2020        busPIRone 10 MG tablet    BUSPAR    60 tablet    Start 1/2 tablet by mouth two times per day for one week, then increase to 1 tablet by mouth two times per day. For anxiety.    TAHIR (generalized anxiety disorder)       carbamide peroxide 6.5 % otic solution    DEBROX     5 drops 2 times daily        clobetasol 0.05 % cream    TEMOVATE    60 g    Apply topically 2 times daily    Folliculitis       Colchicine 0.6 MG Caps     90 capsule    Take 0.6 mg by mouth See Admin Instructions 2 capsules in AM and 1 capsule in PM    Behcet's syndrome (H)       dexamethasone 4 MG/ML injection    DECADRON    30 mL    Apply 1 mL (4 mg) topically as needed    Sprain of other ligament of left ankle, initial encounter       diclofenac 1 % Gel topical gel    VOLTAREN    100 g    Apply 2-4 grams to affected area(s) up to 4 times per day as  needed. This is an anti-inflammatory medication.    Polyarthralgia       dicyclomine 20 MG tablet    BENTYL    60 tablet    Take 1 tablet (20 mg) by mouth 4 times daily as needed    Abdominal cramping       diphenhydrAMINE 25 MG tablet    BENADRYL    30 tablet    Take 1 tablet (25 mg) by mouth every 8 hours as needed for allergies        EPINEPHrine 0.3 MG/0.3ML injection 2-pack    EPIPEN 2-EAMON    0.6 mL    Inject 0.3 mLs (0.3 mg) into the muscle as needed for anaphylaxis    Hx of bee sting allergy       EXCEDRIN MIGRAINE PO      Take by mouth as needed        guanFACINE 1 MG tablet    TENEX    180 tablet    Take 3 tablets (3 mg) by mouth twice daily in the morning and evening daily.    Tic       hyoscyamine 0.125 MG tablet    ANASPAZ/LEVSIN    40 tablet    Take 1-2 tablets (125-250 mcg) by mouth every 4 hours as needed for cramping    Abdominal pain, generalized       hypromellose 0.3 % Soln ophthalmic solution    GENTEAL     1 drop every hour as needed        LACTAID PO      Take by mouth daily        lactulose 20 GM/30ML Soln     300 mL    Take 30 mLs by mouth 3 times daily as needed        lidocaine 2 % topical gel    XYLOCAINE     Apply 1 Tube topically daily Reported on 4/21/2017        lidocaine visc 2% 2.5mL/5mL & maalox/mylanta w/ simeth 2.5mL/5mL & diphenhydrAMINE 5mg/5mL Susp suspension    [facility]    1 Bottle    Swish and swallow 10 mLs in mouth every 6 hours as needed for mouth sores    Behcet's syndrome (H)       linaclotide 290 MCG capsule    LINZESS    90 capsule    Take 1 capsule (290 mcg) by mouth every morning (before breakfast)    Irritable bowel syndrome       LORazepam 1 MG tablet    ATIVAN    90 tablet    Take 0.5 tablets (0.5 mg) by mouth 2 times daily as needed for other (abdominal pain) Do not operate a vehicle after taking this medication    Chronic abdominal pain       meclizine 25 MG tablet    ANTIVERT    30 tablet    Take 1 tablet (25 mg) by mouth every 6 hours as needed  for dizziness    Nausea       metaxalone 800 MG tablet    SKELAXIN    30 tablet    Take 0.5 tablets (400 mg) by mouth 3 times daily as needed for moderate pain    Sprain of neck, initial encounter, Cervical dystonia       norgestimate-ethinyl estradiol 0.25-35 MG-MCG per tablet    ORTHO-CYCLEN, SPRINTEC    84 tablet    Take 1 tablet by mouth daily    General counseling for prescription of oral contraceptives       omeprazole 40 MG capsule    priLOSEC    90 capsule    Take 1 capsule (40 mg) by mouth daily    Gastroesophageal reflux disease, esophagitis presence not specified       ondansetron 8 MG ODT tab    ZOFRAN-ODT    60 tablet    Take 1 tablet (8 mg) by mouth every 8 hours as needed for nausea    Non-intractable vomiting with nausea, unspecified vomiting type, Hematemesis, presence of nausea not specified       predniSONE 20 MG tablet    DELTASONE    10 tablet    Take two tablets (= 40mg) each day for 5 (five) days        promethazine 25 MG tablet    PHENERGAN    20 tablet    Take 0.5-1 tablets (12.5-25 mg) by mouth every 6 hours as needed for nausea    Intractable chronic migraine without aura and without status migrainosus       ranitidine 150 MG tablet    ZANTAC    8 tablet    Take 1 tablet (150 mg) by mouth 2 times daily for 4 days        sucralfate 1 GM/10ML suspension    CARAFATE    1200 mL    Take 10 mLs (1 g) by mouth 4 times daily    Bile reflux gastritis, Nausea       SUMAtriptan 100 MG tablet    IMITREX    18 tablet    Take 1 tablet (100 mg) by mouth at onset of headache for migraine May repeat in 2 hours. Max 2 tablets/24 hours.    Intractable chronic migraine without aura and without status migrainosus       triamcinolone 0.1 % cream    KENALOG    15 g    Apply sparingly to oral ulcers three times daily for 14 days as needed.    Behcet's syndrome (H)       * Notice:  This list has 5 medication(s) that are the same as other medications prescribed for you. Read the directions carefully, and ask your  doctor or other care provider to review them with you.

## 2017-08-01 NOTE — ED PROVIDER NOTES
History     Chief Complaint   Patient presents with     Seizures     HPI  Samara Oropeza is a 21 year old female who presents with a syncopal episode.  She states she was going to the bathroom when she called out to her boyfriend and she called out to him and when he got to the room, she was unconscious and her pupils were dilated.  She was unresponsive for two minutes.   She remembers feeling light headed and faint.  She has no chest pain and shortness of breath.      PAST MEDICAL HISTORY:   Past Medical History:   Diagnosis Date     Anxiety      Arthritis      Behcet's disease (H)      Cervical adenitis May 2010     Chronic abdominal pain      Constipation, chronic 1994     Gastro-oesophageal reflux disease      Gastroparesis      Migraines      Neuromuscular disorder (H)     fibramyalgia     Palpitations      Seizure (H)      Seizures (H)     unknown etiology     Syncope      Tourette's        PAST SURGICAL HISTORY:   Past Surgical History:   Procedure Laterality Date     ARTHROSCOPY KNEE WITH PATELLAR REALIGNMENT  7/25/2013    Procedure: ARTHROSCOPY KNEE WITH PATELLAR REALIGNMENT;  Left Knee Arthroscopy, Medial Patellofemoral Ligament Reconstruction with Allograft  ;  Surgeon: Jennifer Acevedo MD;  Location:  OR     DENTAL SURGERY  1996    Teeth removal     ENDOSCOPY UPPER, COLONOSCOPY, COMBINED  2005      ESOPH/GAS REFLUX TEST W NASAL IMPED >1 HR N/A 2/15/2017    Procedure: ESOPHAGEAL IMPEDENCE FUNCTION TEST WITH 24 HOUR PH GREATER THAN 1 HOUR;  Surgeon: Timothy Matta MD;  Location:  GI       FAMILY HISTORY:   Family History   Problem Relation Age of Onset     Depression Mother      Neurologic Disorder Mother      Migraines, take imitrex injection.  Also in maternal grandmother.       Alcohol/Drug Father      Hypertension Father      Depression Father      Cardiovascular Maternal Grandmother      Depression Maternal Grandmother      Hypertension Maternal Grandmother      Alzheimer  "Disease Maternal Grandmother      Cardiovascular Maternal Grandfather      Hypertension Maternal Grandfather      Depression Maternal Grandfather      Alcohol/Drug Maternal Grandfather      Cardiovascular Paternal Grandmother      Hypertension Paternal Grandmother      Cardiovascular Paternal Grandfather      Hypertension Paternal Grandfather      Glaucoma No family hx of      Macular Degeneration No family hx of        SOCIAL HISTORY:   Social History   Substance Use Topics     Smoking status: Never Smoker     Smokeless tobacco: Never Used     Alcohol use Yes      Comment: seldom       I have reviewed the Medications, Allergies, Past Medical and Surgical History, and Social History in the Epic system.    Review of Systems   All other systems reviewed and are negative.      Physical Exam   BP: (!) 135/93  Pulse: 138  Heart Rate: 137  Temp: 97.5  F (36.4  C)  Resp: 12  Height: 160 cm (5' 3\")  Weight: 66.5 kg (146 lb 9.6 oz)  SpO2: 100 %  Physical Exam   Constitutional: She is oriented to person, place, and time. No distress.   HENT:   Head: Normocephalic and atraumatic.   Eyes: Conjunctivae are normal. Pupils are equal, round, and reactive to light. Right eye exhibits no discharge. Left eye exhibits no discharge.   Neck: Normal range of motion. Neck supple.   Cardiovascular: Normal rate and intact distal pulses.    tachycardic   Pulmonary/Chest: Effort normal. No respiratory distress. She has no wheezes. She has no rales.   Abdominal: There is no tenderness. There is no rebound and no guarding.   Musculoskeletal: Normal range of motion. She exhibits no edema or tenderness.   Neurological: She is alert and oriented to person, place, and time. She displays normal reflexes. No cranial nerve deficit. She exhibits normal muscle tone. Coordination normal.   Skin: Skin is warm and dry. No rash noted. She is not diaphoretic.   Psychiatric: She has a normal mood and affect. Her behavior is normal. Thought content normal. "   Nursing note and vitals reviewed.      ED Course     ED Course     Procedures             Critical Care time:  none       EKG Interpretation:      Interpreted by Syed Bailey  Time reviewed:  824  Symptoms at time of EKG: sycope  Rhythm: normal sinus   Rate: normal  Axis: NORMAL  Ectopy: none  Conduction: normal  ST Segments/ T Waves: No ST-T wave changes  Q Waves: none  Comparison to prior: Unchanged    Clinical Impression: normal EKG                Labs Ordered and Resulted from Time of ED Arrival Up to the Time of Departure from the ED   CBC WITH PLATELETS DIFFERENTIAL - Abnormal; Notable for the following:        Result Value    Absolute Neutrophil 1.4 (*)     All other components within normal limits   BASIC METABOLIC PANEL - Abnormal; Notable for the following:     Potassium 3.3 (*)     All other components within normal limits   ROUTINE UA WITH MICROSCOPIC REFLEX TO CULTURE - Abnormal; Notable for the following:     Blood Urine Small (*)     Mucous Urine Present (*)     All other components within normal limits   HCG QUALITATIVE            Assessments & Plan (with Medical Decision Making)   1. Syncope    20 yo F with recurrent syncopal event, already seen by cardiology and neurology with no clear etiology found.  EKG and lab evaluation normal.  Also, urinalysis with no signs of infection and hcg is negative. Given her thorough work up as an outpatient further evaluation and admission is not needed.  She was told to keep her current neurology appointment and she was also given contact information for cardiology, as she would like a second opinion.     I have reviewed the nursing notes.    I have reviewed the findings, diagnosis, plan and need for follow up with the patient.    Discharge Medication List as of 6/2/2017  9:05 AM          Final diagnoses:   Syncope, unspecified syncope type       6/2/2017   [facility], [place], EMERGENCY DEPARTMENT     Syed Bailey MD  07/31/17 0977

## 2017-08-01 NOTE — NURSING NOTE
Reason For Visit:   Chief Complaint   Patient presents with     RECHECK     left knee pain, seen in the ED 7/31/17 for knee pain, she was seen again in the ED for a reaction to strawberries     Primary: Sonja Abreu NP  Ref. 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  Part-time.  Date of injury: none    Date of surgery: 7/25/13  Type of surgery: OPERATIONS:   1.  Left side  knee exam under anesthesia.   2.  Diagnostic arthroscopy.   3.  Medial patellofemoral ligament reconstruction using allograft. .  Smoker: No    Pain Assessment  Patient Currently in Pain: Yes  0-10 Pain Scale: 6  Primary Pain Location: Knee  Pain Orientation: Left  Pain Descriptors: Stabbing, Pressure, Other (comment) (grinding)  Alleviating Factors: Other (comment)  Aggravating Factors: Walking, Standing (nothing)

## 2017-08-01 NOTE — ED PROVIDER NOTES
History     Chief Complaint   Patient presents with     Allergic Reaction     Possible allergic reaction to strawberries, ate some at 2145, now feels swelling, took benadryl at home.     HPI  Samara Oropeza is a 21-year-old female who presents with concern for allergic reaction.  States at approximately 9:45 this evening she some strawberries. She said that she thought they tasted slightly tangy and was worried that he may have been contaminated by citrus. She states that she is allergic to kiwi, oranges, pineapples, milk protein, nuts and several medications. She is currently describing some sores on the sides of her tongue and an itchy feeling in her tongue and a sensation of some discomfort around her lips.  She is not short of breath.  No wheezing.  She took 3 tablets of Benadryl prior to arrival.      This part of the document was transcribed by Cata Renae, Medical Scribe.  Past Medical History:   Diagnosis Date     Anxiety      Arthritis      Behcet's disease (H)      Cervical adenitis May 2010     Chronic abdominal pain      Constipation, chronic 1994     Gastro-oesophageal reflux disease      Gastroparesis      Migraines      Neuromuscular disorder (H)     fibramyalgia     Palpitations      Seizure (H)      Seizures (H)     unknown etiology     Syncope      Tourette's        Past Surgical History:   Procedure Laterality Date     ARTHROSCOPY KNEE WITH PATELLAR REALIGNMENT  7/25/2013    Procedure: ARTHROSCOPY KNEE WITH PATELLAR REALIGNMENT;  Left Knee Arthroscopy, Medial Patellofemoral Ligament Reconstruction with Allograft  ;  Surgeon: Jennifer Acevedo MD;  Location:  OR     DENTAL SURGERY  1996    Teeth removal     ENDOSCOPY UPPER, COLONOSCOPY, COMBINED  2005     HC ESOPH/GAS REFLUX TEST W NASAL IMPED >1 HR N/A 2/15/2017    Procedure: ESOPHAGEAL IMPEDENCE FUNCTION TEST WITH 24 HOUR PH GREATER THAN 1 HOUR;  Surgeon: Timothy Matta MD;  Location:  GI       Family History   Problem  Relation Age of Onset     Depression Mother      Neurologic Disorder Mother      Migraines, take imitrex injection.  Also in maternal grandmother.       Alcohol/Drug Father      Hypertension Father      Depression Father      Cardiovascular Maternal Grandmother      Depression Maternal Grandmother      Hypertension Maternal Grandmother      Alzheimer Disease Maternal Grandmother      Cardiovascular Maternal Grandfather      Hypertension Maternal Grandfather      Depression Maternal Grandfather      Alcohol/Drug Maternal Grandfather      Cardiovascular Paternal Grandmother      Hypertension Paternal Grandmother      Cardiovascular Paternal Grandfather      Hypertension Paternal Grandfather      Glaucoma No family hx of      Macular Degeneration No family hx of        Social History   Substance Use Topics     Smoking status: Never Smoker     Smokeless tobacco: Never Used     Alcohol use Yes      Comment: seldom       No current facility-administered medications for this encounter.      Current Outpatient Prescriptions   Medication     predniSONE (DELTASONE) 20 MG tablet     ranitidine (ZANTAC) 150 MG tablet     diphenhydrAMINE (BENADRYL) 25 MG tablet     linaclotide (LINZESS) 290 MCG capsule     busPIRone (BUSPAR) 10 MG tablet     LORazepam (ATIVAN) 1 MG tablet     guanFACINE (TENEX) 1 MG tablet     lactulose 20 GM/30ML SOLN     sucralfate (CARAFATE) 1 GM/10ML suspension     ondansetron (ZOFRAN-ODT) 8 MG ODT tab     aspirin (ASPIRIN CHILDRENS) 81 MG chewable tablet     amitriptyline (ELAVIL) 25 MG tablet     omeprazole (PRILOSEC) 40 MG capsule     apremilast (OTEZLA) 30 MG tablet     Colchicine 0.6 MG CAPS     norgestimate-ethinyl estradiol (ORTHO-CYCLEN, SPRINTEC) 0.25-35 MG-MCG per tablet     albuterol (PROAIR HFA/PROVENTIL HFA/VENTOLIN HFA) 108 (90 BASE) MCG/ACT Inhaler     ONABOTULINUMTOXINA IJ     diclofenac (VOLTAREN) 1 % GEL topical gel     metaxalone (SKELAXIN) 800 MG tablet     dicyclomine (BENTYL) 20 MG  tablet     EPINEPHrine (EPIPEN 2-EAMON) 0.3 MG/0.3ML injection     dexamethasone (DECADRON) 4 MG/ML injection     OnabotulinumtoxinA (BOTOX IJ)     OnabotulinumtoxinA (BOTOX IJ)     SUMAtriptan (IMITREX) 100 MG tablet     promethazine (PHENERGAN) 25 MG tablet     meclizine (ANTIVERT) 25 MG tablet     Magic Mouthwash (FV std formula) lidocaine visc 2% 2.5mL/5mL & maalox/mylanta w/ simeth 2.5mL/5mL & diphenhydrAMINE 5mg/5mL     clobetasol (TEMOVATE) 0.05 % cream     OnabotulinumtoxinA (BOTOX IJ)     botulinum toxin type A (BOTOX) 100 UNITS injection     hyoscyamine (ANASPAZ,LEVSIN) 0.125 MG tablet     Aspirin-Acetaminophen-Caffeine (EXCEDRIN MIGRAINE PO)     hypromellose (GENTEAL) 0.3 % SOLN     betamethasone valerate (VALISONE) 0.1 % cream     carbamide peroxide (DEBROX) 6.5 % otic solution     Lactase (LACTAID PO)     lidocaine (XYLOCAINE) 2 % jelly     triamcinolone (KENALOG) 0.1 % cream        Allergies   Allergen Reactions     Amoxil [Penicillins] Rash     Dad unsure of reaction.     Bee Venom Anaphylaxis     Contrast Dye Rash     Contrast Media Ready-Box [facility], 04/09/2014.; Contrast Media Ready-Box [facility], 04/09/2014.  NOTE: this is a contrast media oral with iodine. Premedicate with methylpred standard for IV contrast, request barium contrast for oral contrast.     Kiwi Swelling     Orange Fruit [Citrus] Anaphylaxis     Pineapple Anaphylaxis, Difficulty breathing and Rash     Milk Protein Extract Hives     Reglan [Metoclopramide] Other (See Comments)     IV dose only, in ER, rapid heart rate.     Ace Inhibitors      Difficulty in breathing and GI upset     Amitiza [Lubiprostone] Nausea and Vomiting     Amoxicillin-Pot Clavulanate      Latex      Midazolam Unknown     parent states that when pt takes this medication, she wakes up being very violent .     Nuts      Contrast Media Ready-Box [facility], 04/09/2014.; Contrast Media Ready-Box [facility], 04/09/2014.       Versed      Coming out of pelvic exam at age of 5, was kicking  and screaming when coming out of the versed.     Adhesive Tape Rash     Azithromycin Hives and Rash     Cephalexin Itching and Rash     Itchy mouth     Keflex [Cephalexin-Fd&C Yellow #6] Hives     I have reviewed the Medications, Allergies, Past Medical and Surgical History, and Social History in the Epic system.    Review of Systems   Constitutional: Negative for fever.   HENT: Positive for mouth sores. Negative for trouble swallowing and voice change.         Positive for tongue itching. Positive for lip discomfort.   Respiratory: Negative for shortness of breath and wheezing.    Cardiovascular: Negative for chest pain.   Gastrointestinal: Negative for abdominal pain.   All other systems reviewed and are negative.      Physical Exam   BP: 120/86  Pulse: 103  Heart Rate: 103  Temp: 98.2  F (36.8  C)  Resp: 16  SpO2: 100 %    Physical Exam  Gen:A&Ox3, no acute distress  HEENT:PERRL, no facial tenderness or wounds, head atraumatic, oropharynx clear, mucous membranes moist, TMs clear bilaterally. No swelling of face, lips, tongue, neck, soft palate. No sores or lesions noted in the mouth.   Neck:no bony tenderness or step offs, no JVD, trachea midline  CV:RRR without murmurs  PULM:Clear to auscultation bilaterally  Abd:soft, nontender, nondistended. Bowel sounds present and normal  UE:No traumatic injuries, skin normal  LE:no traumatic injuries, skin normal, no LE edema  Neuro:CN II-XII intact, strength 5/5 throughout, gait stable.   Skin: no rashes or ecchymoses      ED Course     ED Course     Procedures             EKG Interpretation:      Interpreted by Lizbet Astudillo  Time reviewed: 11:21PM  Symptoms at time of EKG: allergic reaction   Rhythm: sinus tachycardia  Rate: 110  Axis: normal  Ectopy: PVCs  Conduction: normal  ST Segments/ T Waves: No ST-T wave changes  Q Waves: none  Comparison to prior: Unchanged from June 2, 2017 other than faster heart rate    Clinical Impression: sinus tachycardia with  [facility]        Critical Care time:  40 minutes     Assessments & Plan (with Medical Decision Making)   21 year old female with history of multiple food allergies presenting with concern for allergic reaction after eating strawberries.  Her vitals are notable for tachycardia and normal blood pressure.  Her physical exam shows no stridor, voice hoarseness, soft palate swelling marked facial swelling or wheezing.  She was treated with 1 L of IV fluid, 125 mg and IV Solu-Medrol  and 50 mg IV Zantac. No additional Benadryl was given as she had taken 75 mg prior to arrival and was appearing sleepy.    12:39 AM  Pt reassessed. Continues to feel discomfort in throat but O2, work of breathing, speech normal. No wheezing or stridor. No swelling to lips, mouth, face, tongue. Will continue to monitor.     Reassessed again and pt continues to do well. No speech changes, no facial swelling.   Ready for discharge home. Continue prednisone, zantac for 4 days. Benadryl q6 hours PRN.   Follow up with primary care.       I have reviewed the nursing notes.  I have reviewed the findings, diagnosis, plan and need for follow up with the patient.  Discharge Medication List as of 8/1/2017  1:37 AM      START taking these medications    Details   predniSONE (DELTASONE) 20 MG tablet Take two tablets (= 40mg) each day for 5 (five) days, Disp-10 tablet, R-0, Local Print             Final diagnoses:   Allergic reaction, initial encounter       7/31/2017   [facility], [place], EMERGENCY DEPARTMENT    MD GIANNI Taylor Katrina Anne, MD  08/04/17 [id number]  Physical exam added     Lizbet Astudillo MD  08/12/17 [id number]

## 2017-08-01 NOTE — LETTER
8/1/2017      RE: Samara Oropeza  [address]  [place] [address]       HISTORY OF PRESENT ILLNESS:  The patient is a 21-year-old female who is well known to me.  She is status post an MPFL reconstruction in 2013.  She did well for many years, and I was not seeing her.      She returned to see me in October of this year with reports of pain and the sense of instability.  Physical exam revealed a stable kneecap.  There began to be this disconnect between what she was reporting and feeling, and what we were examining objectively.      At that time, we did get an MRI.  The MRI was essentially negative.      She does respond to a tape technique, which includes unloading and lateral-to-medial.  She has been taught this, but she admits that it is hard to do herself.      She is very diligent about doing her physical therapy at home.      She has had a number of other intervening problems.  She has a history of Behcet's disease.  She has a history of gastroparesis, chronic abdominal pain and constipation, GERD, fibromyalgia.      She presented to the Emergency Department yesterday.  She was pretty insistent that her kneecap when out of place.  She reported pain that she could not stand, and could not support her body weight.  She was fairly insistent about getting an MRI.      An MRI was obtained.  The MRI by my eye shows no structural change from the one in October, and more importantly no evidence of an acute injury.  There was no subchondral edema, no knee swelling, no tissue swelling.      Since yesterday, she reports that she has been intermittently using the knee immobilizer, but has been able to bear weight on it.      PHYSICAL EXAMINATION:  A physical exam was again repeated.  She shows good straight leg raising effort; however, extension from a flexed knee position she struggles with in regards to quad activation.  She has no knee swelling.  She has mild hypersensitivity with any movement of the  kneecap.  Kneecap shows 2 quadrants lateral mobility with a firm end point.  MPFL is palpable.      The kneecap tracks well through an active arc of motion.  There is mild crepitus in early extension and flexion.      ASSESSMENT:  I have tried to impart on Samara, who is present here today with her boyfriend and mother, that her kneecap is structurally stable, and I do not have any objective reason that can account for her degree of pain.      In addition, the MRI is very sensitive to any kind of injury pattern, and in the absence of the MRI showing any kind of swelling or edema, the likelihood that she did any serious injury to his kneecap is quite low.      She has benefitted from a tape technique in the past.  We taught her this today, and did report that her knee pain was reduced from a level 6 to a level 3.  We will be available to try to progress her through taping and better functional activities to the degree that she is able to attend.      In the absence of problems, I will continue to be available for consultation, but no planned followup will be done.      Room time:  25 minutes.  Consultation time:  25.         Jennifer Acevedo MD

## 2017-08-01 NOTE — PROGRESS NOTES
Progress Note    Client Name: Samara Oropeza  Date: 7/18/2017              Service Type: Individual      Session Start Time: 2:45  Session End Time: 3:30      Session Length: 45     Session #: 14     Attendees: Client attended alone    Treatment Plan Last Reviewed: 6/27/2017    PHQ-9 / TAHIR-7 : 8/1/2017      DATA      Progress Since Last Session (Related to Symptoms / Goals / Homework):   Symptoms: Stable    Homework: Achieved / completed to satisfaction- up to full dose on her medications, has been going to mom's home      Episode of Care Goals: Satisfactory progress - ACTION (Actively working towards change); Intervened by reinforcing change plan / affirming steps taken     Current / Ongoing Stressors and Concerns:  Client reports being at ED yesterday due to being unable to walk.  Not eating well.  Had an allergic reaction due to strawberries.  Has not seen her father over the past few weeks.  She did go to her mother's home.  She got a response back from  and is working on getting them paperwork around her disability and inability to be at school.  Client reports feeling overwhelmed and irritable with all of the medical procedures that is happening- anxiety is high and she worries about her health and being able to get better.  Also, she reports being sad about not being able to see her father.  Spends her time at home just managing to eat with the nausea and pain present.  Sleep continues to be a challenge as well.      Treatment Objective(s) Addressed in This Session:   use cognitive strategies identified in therapy to challenge anxious thoughts  Increase restful sleep     Intervention:   CBT: breaking down tasks and setting up realistic goals        ASSESSMENT: Current Emotional / Mental Status (status of significant symptoms):   Risk status (Self / Other harm or suicidal ideation)   Client denies current fears or concerns for personal safety.   Client denies  "current or recent suicidal ideation or behaviors. Told her bf about passive SI thought- \"I want to go and be with my dog\"    Client denies current or recent homicidal ideation or behaviors.   Client denies current or recent self injurious behavior or ideation.   Client denies other safety concerns.   A safety and risk management plan has not been developed at this time, however client was given the after-hours number / 911 should there be a change in any of these risk factors.     Appearance:   Appropriate    Eye Contact:   Fair    Psychomotor Behavior: Normal    Attitude:   Cooperative    Orientation:   All   Speech    Rate / Production: Monotone     Volume:  Soft    Mood:    Anxious  Depressed    Affect:    Flat  Subdued  Irritable    Thought Content:  Clear    Thought Form:  Coherent  Logical    Insight:    Fair      Medication Review:   No changes to current psychiatric medication(s)     Medication Compliance:   Yes encouraged to start medications     Changes in Health Issues:   Yes: Pain, Associated Psychological Distress- started working with pain management     Chemical Use Review:   Substance Use: increase in alcohol .  Client reports frequency of use once on Sauturday which resulted in trip to [facility].  Provided encouragement towards sobriety        Tobacco Use: No current tobacco use.       Collateral Reports Completed:   Not Applicable    PLAN: (Client Tasks / Therapist Tasks / Other)  Homework:      1.   Continue with goals around self-care  2.  Continue to identify and take smaller more manageable steps towards goals of moving and finances.      ALISA Burt                                                         ________________________________________________________________________    Treatment Plan    Client's Name: Samara Oropeza  YOB: 1995    Date: 3/14/2017     DSM-V Diagnoses: 296.32 Major Depressive Disorder, Recurrent Episode, Moderate _ or 300.02 " (F41.1) Generalized Anxiety Disorder  Psychosocial / Contextual Factors: Moderate- Minimal contact with family, Health issues  WHODAS: 30    Referral / Collaboration:  Referral to another professional/service is not indicated at this time..    Anticipated number of session or this episode of care: 12      MeasurableTreatment Goal(s) related to diagnosis / functional impairment(s)  Goal 1: Client will decrease anxiety symptoms as eveidenced by self-report and TAHIR-7 scores, currently at 14, goal to 7 or below    I will know I've met my goal when I am better able to deal with it.      Objective #A (Client Action)    Client will use relaxation strategies 3x times per day to reduce the physical symptoms of anxiety.  Status: Continued - Date(s): 6/27/2017      Intervention(s)  Therapist will teach different relaxation strategies and have client complete one between session.    Objective #B  Client will use cognitive strategies identified in therapy to challenge anxious thoughts.  Status: Continued - Date(s): 6/27/2017      Intervention(s)  Therapist will 1.  introduce cognitive distortions; 2. build awareness for client; 3. leanr an implement diffferent cognitive restructuring techniques.    Objective #C  Client will increase her amount of restful sleep.  Status: Continued - Date(s): 6/27/2017      Intervention(s)  Therapist will 1. start a sleep journal; 2. Introduce techniques for falling and staying asleep.        Client has reviewed and agreed to the above plan.      Layla Browne, [facility]  March 14, 2017

## 2017-08-02 ENCOUNTER — CARE COORDINATION (OUTPATIENT)
Dept: FAMILY MEDICINE | Facility: CLINIC | Age: 22
End: 2017-08-02

## 2017-08-02 ASSESSMENT — PATIENT HEALTH QUESTIONNAIRE - PHQ9: SUM OF ALL RESPONSES TO PHQ QUESTIONS 1-9: 17

## 2017-08-02 ASSESSMENT — ANXIETY QUESTIONNAIRES: GAD7 TOTAL SCORE: 18

## 2017-08-02 NOTE — PROGRESS NOTES
Clinic Care Coordination Contact  Care Team Conversations    Pro-active outreach for ED follow up reveals her orthopedic care coordinator has already reached out with that call. Patient was seen by mental health therapist and orthopedic specialist yesterday. Will close to care coordination at this time.     Teresa Cam R.N.  Clinic Care Coordinator  [facility]  [phone]

## 2017-08-03 ENCOUNTER — OFFICE VISIT (OUTPATIENT)
Dept: PALLIATIVE MEDICINE | Facility: CLINIC | Age: 22
End: 2017-08-03
Payer: COMMERCIAL

## 2017-08-03 DIAGNOSIS — G89.4 CHRONIC PAIN SYNDROME: Primary | ICD-10-CM

## 2017-08-03 DIAGNOSIS — M25.50 POLYARTHRALGIA: ICD-10-CM

## 2017-08-03 PROCEDURE — 97110 THERAPEUTIC EXERCISES: CPT | Mod: GP | Performed by: PHYSICAL THERAPIST

## 2017-08-03 PROCEDURE — 97112 NEUROMUSCULAR REEDUCATION: CPT | Mod: GP | Performed by: PHYSICAL THERAPIST

## 2017-08-03 PROCEDURE — 96152 HC HEALTH AND BEHAVIOR INTERVENTION, INDIVIDUAL, EACH 15 MINUTES: CPT | Performed by: PSYCHOLOGIST

## 2017-08-03 NOTE — MR AVS SNAPSHOT
After Visit Summary   8/3/2017    Samara Oropeza    MRN: [id number]           Patient Information     Date Of Birth          1995        Visit Information        Provider Department      8/3/2017 11:00 AM Kimo Hudson, [facility]        Today's Diagnoses     Chronic pain syndrome    -  1    Polyarthralgia           Follow-ups after your visit        Your next 10 appointments already scheduled     Aug 08, 2017 10:30 AM CDT   Return Visit with ALISA Burt   [facility] ([facility])    [address] [id number] [phone]            Aug 10, 2017 10:00 AM CDT   Return Visit with Emily Rollins PT   [facility] ([facility])    [address]  [facility] [address]  [facility] [address] [phone]            Aug 15, 2017  1:50 PM CDT   (Arrive by 1:35 PM)   LUIZ Extremity with Jennifer Alvarenga PT   [facility] ([facility])    [address] [place] [address] [phone]            Aug 16, 2017  2:45 PM CDT   Return Visit with Cata Hurst NP   [facility] ([facility])    [address]  [address]  [facility] [id number] [phone]            Aug 17, 2017 11:00 AM CDT   Return Visit with Kimo Hudson LP   [facility])    [address] [facility] [address] [phone]            Aug 17, 2017  1:45 PM CDT   Return Visit with Emily Rollins PT   [facility] ([facility])    [address]  [facility] [address]  [facility] [address] [phone]            Aug 22, 2017  7:20 AM CDT   LUIZ Extremity with TAINA Bustillo [facility] ([facility])    [address] [place] [address]  "Floor  [facility] [address] [phone]            Aug 22, 2017 11:00 AM CDT   [facility]    [address] [id number] [phone]            Aug 31, 2017  9:00 AM CDT   Return Visit with EVI Dahl CNP   [facility] ([facility])    [address]  [address]  [facility] [address] [phone]            Aug 31, 2017 10:00 AM CDT   Return Visit with Emily Rollins PT   [facility] ([facility])    [address]  [address]  [facility] [address] [phone]              Who to contact     If you have questions or need follow up information about today's clinic visit or your schedule please contact [facility] directly at [phone].  Normal or non-critical lab and imaging results will be communicated to you by Cardicahart, letter or phone within 4 business days after the clinic has received the results. If you do not hear from us within 7 days, please contact the clinic through Cardicahart or phone. If you have a critical or abnormal lab result, we will notify you by phone as soon as possible.  Submit refill requests through [employer] or call your pharmacy and they will forward the refill request to us. Please allow 3 business days for your refill to be completed.          Additional Information About Your Visit        Cardicahart Information     [employer]ria lets you send messages to your doctor, view your test results, renew your prescriptions, schedule appointments and more. To sign up, go to [email or web address] . Click on \"Log in\" on the left side of the screen, which will take you to the Welcome page. Then click on \"Sign up Now\" on the right side of the page.     You will be asked to enter the access code listed below, as well as some personal information. Please follow the " directions to create your username and password.     Your access code is: [email or web address]  Expires: 10/29/2017 10:26 AM     Your access code will  in 90 days. If you need help or a new code, please call your [facility] clinic or [phone].        Care EveryWhere ID     This is your Care EveryWhere ID. This could be used by other organizations to access your [facility] medical records  [id number]         Blood Pressure from Last 3 Encounters:   17 123/85   17 118/83   17 100/60    Weight from Last 3 Encounters:   17 65.8 kg (145 lb)   17 66.2 kg (146 lb)   17 66.2 kg (146 lb)              We Performed the Following     HEALTH & BEHAVIOR ASSESS, INITIAL, EA 15MIN PHD        Primary Care Provider Office Phone # Fax #    Sonja EVI Laguerre [facility] [phone]       [facility] [address] [facility] [address]  [facility] [place] [address]        Equal Access to Services     FRANK GALEANO : Hadii aad ku hadasho Soomaali, waaxda luqadaha, qaybta kaalmada adeegyada, waxjarred salinas hayarlet rodriguez . So [facility] [phone].    ATENCIÓN: Si habla español, tiene a livingston disposición servicios gratuitos de asistencia lingüística. Michaelame al [phone].    We comply with applicable federal civil rights laws and [place] laws. We do not discriminate on the basis of race, color, national origin, age, disability sex, sexual orientation or gender identity.            Thank you!     Thank you for choosing [facility]  for your care. Our goal is always to provide you with excellent care. Hearing back from our patients is one way we can continue to improve our services. Please take a few minutes to complete the written survey that you may receive in the mail after your visit with us. Thank you!             Your Updated Medication List - Protect others around you: Learn how to safely use, store and throw away your medicines at www.disposemymeds.org.          This list is  accurate as of: 8/3/17 11:59 PM.  Always use your most recent med list.                   Brand Name Dispense Instructions for use Diagnosis    albuterol 108 (90 BASE) MCG/ACT Inhaler    PROAIR HFA/PROVENTIL HFA/VENTOLIN HFA    1 Inhaler    Inhale 2 puffs into the lungs every 6 hours as needed for shortness of breath / dyspnea or wheezing    Atypical chest pain       amitriptyline 25 MG tablet    ELAVIL    45 tablet    Take 1 tablet (25 mg) by mouth At Bedtime    Atypical chest pain, Insomnia, unspecified type       apremilast 30 MG tablet    OTEZLA    60 tablet    20 mg in AM and 30mg in PM daily X 2 weeks and then 30mg twice daily.    Behcet's disease (H)       ASPIRIN CHILDRENS 81 MG chewable tablet   Generic drug:  aspirin      Take 81 mg by mouth as needed        betamethasone valerate 0.1 % cream    VALISONE     Apply topically 2 times daily        * botulinum toxin type A 100 UNITS injection    BOTOX    200 Units    Inject 200 Units into the muscle every 3 months    Cervical dystonia       * BOTOX IJ      Inject 150 Units into the muscle once Lot: /C3 Exp: 05/2019        * BOTOX IJ      Inject 150 Units into the muscle Lot # /C3  Exp: 8/2019        * BOTOX IJ      Inject 162.5 Units as directed once Lot #: /C3 Exp: 10/2019        * ONABOTULINUMTOXINA IJ      Inject 165 Units as directed once Lot # /C3 Expiration Date: 01/2020        busPIRone 10 MG tablet    BUSPAR    60 tablet    Start 1/2 tablet by mouth two times per day for one week, then increase to 1 tablet by mouth two times per day. For anxiety.    TAHIR (generalized anxiety disorder)       carbamide peroxide 6.5 % otic solution    DEBROX     5 drops 2 times daily        clobetasol 0.05 % cream    TEMOVATE    60 g    Apply topically 2 times daily    Folliculitis       Colchicine 0.6 MG Caps     90 capsule    Take 0.6 mg by mouth See Admin Instructions 2 capsules in AM and 1 capsule in PM    Behcet's syndrome (H)       dexamethasone 4  MG/ML injection    DECADRON    30 mL    Apply 1 mL (4 mg) topically as needed    Sprain of other ligament of left ankle, initial encounter       diclofenac 1 % Gel topical gel    VOLTAREN    100 g    Apply 2-4 grams to affected area(s) up to 4 times per day as needed. This is an anti-inflammatory medication.    Polyarthralgia       dicyclomine 20 MG tablet    BENTYL    60 tablet    Take 1 tablet (20 mg) by mouth 4 times daily as needed    Abdominal cramping       diphenhydrAMINE 25 MG tablet    BENADRYL    30 tablet    Take 1 tablet (25 mg) by mouth every 8 hours as needed for allergies        EPINEPHrine 0.3 MG/0.3ML injection 2-pack    EPIPEN 2-EAMON    0.6 mL    Inject 0.3 mLs (0.3 mg) into the muscle as needed for anaphylaxis    Hx of bee sting allergy       EXCEDRIN MIGRAINE PO      Take by mouth as needed        guanFACINE 1 MG tablet    TENEX    180 tablet    Take 3 tablets (3 mg) by mouth twice daily in the morning and evening daily.    Tic       hyoscyamine 0.125 MG tablet    ANASPAZ/LEVSIN    40 tablet    Take 1-2 tablets (125-250 mcg) by mouth every 4 hours as needed for cramping    Abdominal pain, generalized       hypromellose 0.3 % Soln ophthalmic solution    GENTEAL     1 drop every hour as needed        LACTAID PO      Take by mouth daily        lactulose 20 GM/30ML Soln     300 mL    Take 30 mLs by mouth 3 times daily as needed        lidocaine 2 % topical gel    XYLOCAINE     Apply 1 Tube topically daily Reported on 4/21/2017        lidocaine visc 2% 2.5mL/5mL & maalox/mylanta w/ simeth 2.5mL/5mL & diphenhydrAMINE 5mg/5mL Susp suspension    [facility]    1 Bottle    Swish and swallow 10 mLs in mouth every 6 hours as needed for mouth sores    Behcet's syndrome (H)       linaclotide 290 MCG capsule    LINZESS    90 capsule    Take 1 capsule (290 mcg) by mouth every morning (before breakfast)    Irritable bowel syndrome       LORazepam 1 MG tablet    ATIVAN    90 tablet    Take 0.5 tablets  (0.5 mg) by mouth 2 times daily as needed for other (abdominal pain) Do not operate a vehicle after taking this medication    Chronic abdominal pain       meclizine 25 MG tablet    ANTIVERT    30 tablet    Take 1 tablet (25 mg) by mouth every 6 hours as needed for dizziness    Nausea       metaxalone 800 MG tablet    SKELAXIN    30 tablet    Take 0.5 tablets (400 mg) by mouth 3 times daily as needed for moderate pain    Sprain of neck, initial encounter, Cervical dystonia       norgestimate-ethinyl estradiol 0.25-35 MG-MCG per tablet    ORTHO-CYCLEN, SPRINTEC    84 tablet    Take 1 tablet by mouth daily    General counseling for prescription of oral contraceptives       omeprazole 40 MG capsule    priLOSEC    90 capsule    Take 1 capsule (40 mg) by mouth daily    Gastroesophageal reflux disease, esophagitis presence not specified       ondansetron 8 MG ODT tab    ZOFRAN-ODT    60 tablet    Take 1 tablet (8 mg) by mouth every 8 hours as needed for nausea    Non-intractable vomiting with nausea, unspecified vomiting type, Hematemesis, presence of nausea not specified       predniSONE 20 MG tablet    DELTASONE    10 tablet    Take two tablets (= 40mg) each day for 5 (five) days        promethazine 25 MG tablet    PHENERGAN    20 tablet    Take 0.5-1 tablets (12.5-25 mg) by mouth every 6 hours as needed for nausea    Intractable chronic migraine without aura and without status migrainosus       ranitidine 150 MG tablet    ZANTAC    8 tablet    Take 1 tablet (150 mg) by mouth 2 times daily for 4 days        sucralfate 1 GM/10ML suspension    CARAFATE    1200 mL    Take 10 mLs (1 g) by mouth 4 times daily    Bile reflux gastritis, Nausea       SUMAtriptan 100 MG tablet    IMITREX    18 tablet    Take 1 tablet (100 mg) by mouth at onset of headache for migraine May repeat in 2 hours. Max 2 tablets/24 hours.    Intractable chronic migraine without aura and without status migrainosus       triamcinolone 0.1 % cream     KENALOG    15 g    Apply sparingly to oral ulcers three times daily for 14 days as needed.    Behcet's syndrome (H)       * Notice:  This list has 5 medication(s) that are the same as other medications prescribed for you. Read the directions carefully, and ask your doctor or other care provider to review them with you.

## 2017-08-03 NOTE — PROGRESS NOTES
"Patient Name: Samara Oropeza      YOB: 1995     Medical Record Number: [id number]  Diagnosis: Chronic pain syndrome  Visit Info Length of Visit: 45 min  Arrived: On Time  Therapeutic Procedures/Exercises: 15 min; Therapeutic Activities: NA; Neuromuscular Re-education: 30 min    Exercise/Activity Education Instruction:  Therapeutic Neuroscience Education:  Pt educated on the concept that in some people with chronic pain, the nervous system can become extra sensitive and act like a hypersensitive alarm system.  Discussed how PTSD sx, anxiety and depression impact this hypersensitive alarm system.        Activity:  Stretching:  Instructed in supine pretzel and piriformis stretches - pt required moderate v cues to not overdo stretches - b/c she has the mobility she pushes her movements as far as she can; Instructed in kinesthetic awareness of comfortable end ranges for stretches and use of mindful breathing with stretches.  Pt to practice stretching with more mindful awareness vs pushing as far as she can.      Postural Training:  Seated neutral.  Moderate cues to find seated neutral - had pt practice moving in and out of neutral to old posture habit.  Pt reported feeling increased comfort when in a more neutral position.  Instructed in posture habit reversal - set reminder cues to move in/out of old to new posture habit off and on throughout the day        Notes: Patient reports: she has been to ER x 2 over the interval: 1x d/t  increased knee pain (no known trigger, \"just felt sharp pain when I got out of bed\"), then returned several hours later d/t allergic rxn (\"my tongue and throat swelled\" after eating fruit - strawberries and blueberries - she thinks the strawberries were \"cross contaminated\" by citrus fruit as she usually doesn't have difficulty eating strawberries).  She reports she was in hospital for a couple days - though can find no documentation for admission - looks like last ER visit " "was with admission late at night on 7/31 and then DC from ER later the next day. Had f/u with ortho after ER visit and ortho cleared knee status - no visible findings for degree of knee pain reported.      Pt presents today using a single crutch on her right side to support left knee pain (though her gait pattern with the crutch does not stay consistent to support the left side and WBing and ROM throughout gait cycle appear uncompromised.)   Given patient's level of pain c/o and insistence that there was something not right in her knee this therapist decided to defer work on body mechanics today - when this plan relayed to pt she looked puzzled and said, \"I can still do it.\" (Note in EPIC, pt has a referral to restart PT for her knee - appears to have appt scheduled in approximately two weeks; will keep chronic pain management PT focused on her generalized pain and defer specific knee treatment to outpt ortho PT)    In addition to her increased left knee pain she feels her LBP and upper back/neck pain are the most disruptive currently.   Reports her stress levels continue to be very elevated.      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      HEP/Self Care/Home Management Training:   Pacing/Mini Breaks/Self Care: not addressed today.  ;   Relaxation Practice: not addressed today.  ;   Posture Corrections: reports over the interval she was more aware of how much she locks her knees in standing - made frequent changes into neutral when she noted her knee locking ;   Walking program: continues with HIIT exercise videos along with Pilates and Yoga videos - she reports she did modify the exs the past couple days after her \"hospitalization\" ;   Heat/Ice/TENS: not addressed today.   ;   HEP not addressed today.      Patient's pain c/o and expression of limitations d/t pain continues to be incongruent with movements observed " during PT.    Pt appears to have significant psychosocial overlay and her insight regarding the psychosocial contributing factors appears limited.       Demonstrates/Verbalizes Technique: 3 (1= poor technique-difficulty performing exercises,significant cues required; 5= good technique-performs exercises without cues)  Body Awareness: 1, 2 (1=low awareness; 5=high awareness)  Posture/Stability: 3, 4 (1= poor posture, stability; 5= good posture, stability)   Motivational Level:   Cooperative Participation:  Full   Patient Satisfaction:  satisfied   Response to Teaching:   demonstrated ability and verbalized, recall/understanding  Factors that affect learning: None   Plan of Care  Cont PT to support reactivation and integration of self regulation pain management skills. (next visit consider:  UB stretches including pec str/anterior chest opening with relaxation techniques, body mechanics training.)   Therapist: Emily Rollins PT                 Date: 8/3/2017

## 2017-08-03 NOTE — MR AVS SNAPSHOT
After Visit Summary   8/3/2017    Samara Oropeza    MRN: [id number]           Patient Information     Date Of Birth          1995        Visit Information        Provider Department      8/3/2017 8:30 AM Emily Rollins, PT [facility]        Today's Diagnoses     Chronic pain syndrome    -  1       Follow-ups after your visit        Your next 10 appointments already scheduled     Aug 08, 2017 10:30 AM CDT   Return Visit with [facility]la [facility] ([facility])    [address] [id number] [phone]            Aug 10, 2017 10:00 AM CDT   Return Visit with Emily Rollins PT   [facility] ([facility])    [address]  [address] [address] [phone]            Aug 15, 2017  1:50 PM CDT   (Arrive by 1:35 PM)   LUIZ Extremity with Jennifer Alvarenga PT   [facility] ([facility])    [address] [place] [address] [phone]            Aug 16, 2017  2:45 PM CDT   Return Visit with Cata Hurst NP   [facility]    [address]  [address]  [facility] [address] [phone]            Aug 17, 2017 11:00 AM CDT   Return Visit with Kimo Hudson LP   [facility] ([facility])    [address] [facility] [address] [phone]            Aug 17, 2017  1:45 PM CDT   Return Visit with Emily Rollins PT   [facility] ([facility])    [address]  [address] [address] [phone]            Aug 22, 2017  7:20 AM CDT   LUIZ Extremity with [facility] ([facility])    [address] [place] [address]  "[place] [address]   [phone]            Aug 22, 2017 11:00 AM CDT   Return Visit with Austen Marquez MD   [facility] ([facility])    [address] [address]   [phone]            Aug 31, 2017  9:00 AM CDT   Return Visit with EVI Dahl CNP   [facility] ([facility])    [address]  [address]  [facility] [phone] [phone]            Aug 31, 2017 10:00 AM CDT   Return Visit with Emily Rollins PT   [facility] ([facility])    [address]  [address]  [facility] [address] [phone]              Who to contact     If you have questions or need follow up information about today's clinic visit or your schedule please contact [facility] directly at [phone].  Normal or non-critical lab and imaging results will be communicated to you by [employer], letter or phone within 4 business days after the clinic has received the results. If you do not hear from us within 7 days, please contact the clinic through [employer] or phone. If you have a critical or abnormal lab result, we will notify you by phone as soon as possible.  Submit refill requests through [employer] or call your pharmacy and they will forward the refill request to us. Please allow 3 business days for your refill to be completed.          Additional Information About Your Visit        [employer] Information     [employer] lets you send messages to your doctor, view your test results, renew your prescriptions, schedule appointments and more. To sign up, go to [email or web address] . Click on \"Log in\" on the left side of the screen, which will take you to the Welcome page. Then click on \"Sign up Now\" on the right side of the page.     You will be asked to enter the access code listed below, as well as some personal information. Please follow the directions to " create your username and password.     Your access code is: [email or web address]  Expires: 10/29/2017 10:26 AM     Your access code will  in 90 days. If you need help or a new code, please call your [facility] clinic or [phone].        Care EveryWhere ID     This is your Care EveryWhere ID. This could be used by other organizations to access your [facility] medical records  [id number]         Blood Pressure from Last 3 Encounters:   17 123/85   17 118/83   17 100/60    Weight from Last 3 Encounters:   17 65.8 kg (145 lb)   17 66.2 kg (146 lb)   17 66.2 kg (146 lb)              We Performed the Following     NEUROMUSCULAR RE-EDUCATION     THERAPEUTIC EXERCISES        Primary Care Provider Office Phone # Fax #    Sonja EVI Laguerre [facility] [phone]       [facility] [address] [facility] [address]  [facility] [address]        Equal Access to Services     FRANK GALEANO : Hadii aad ku hadasho Soomaali, waaxda luqadaha, qaybta kaalmada adeegyada, waxay idiin haysophian maddie rodriguez . So [facility] [phone].    ATENCIÓN: Si habla español, tiene a livingston disposición servicios gratuitos de asistencia lingüística. Llame al [phone].    We comply with applicable federal civil rights laws and [place] laws. We do not discriminate on the basis of race, color, national origin, age, disability sex, sexual orientation or gender identity.            Thank you!     Thank you for choosing [facility]  for your care. Our goal is always to provide you with excellent care. Hearing back from our patients is one way we can continue to improve our services. Please take a few minutes to complete the written survey that you may receive in the mail after your visit with us. Thank you!             Your Updated Medication List - Protect others around you: Learn how to safely use, store and throw away your medicines at www.disposemymeds.org.          This list is  accurate as of: 8/3/17  3:09 PM.  Always use your most recent med list.                   Brand Name Dispense Instructions for use Diagnosis    albuterol 108 (90 BASE) MCG/ACT Inhaler    PROAIR HFA/PROVENTIL HFA/VENTOLIN HFA    1 Inhaler    Inhale 2 puffs into the lungs every 6 hours as needed for shortness of breath / dyspnea or wheezing    Atypical chest pain       amitriptyline 25 MG tablet    ELAVIL    45 tablet    Take 1 tablet (25 mg) by mouth At Bedtime    Atypical chest pain, Insomnia, unspecified type       apremilast 30 MG tablet    OTEZLA    60 tablet    20 mg in AM and 30mg in PM daily X 2 weeks and then 30mg twice daily.    Behcet's disease (H)       ASPIRIN CHILDRENS 81 MG chewable tablet   Generic drug:  aspirin      Take 81 mg by mouth as needed        betamethasone valerate 0.1 % cream    VALISONE     Apply topically 2 times daily        * botulinum toxin type A 100 UNITS injection    BOTOX    200 Units    Inject 200 Units into the muscle every 3 months    Cervical dystonia       * BOTOX IJ      Inject 150 Units into the muscle once Lot: /C3 Exp: 05/2019        * BOTOX IJ      Inject 150 Units into the muscle Lot # /C3  Exp: 8/2019        * BOTOX IJ      Inject 162.5 Units as directed once Lot #: /C3 Exp: 10/2019        * ONABOTULINUMTOXINA IJ      Inject 165 Units as directed once Lot # /C3 Expiration Date: 01/2020        busPIRone 10 MG tablet    BUSPAR    60 tablet    Start 1/2 tablet by mouth two times per day for one week, then increase to 1 tablet by mouth two times per day. For anxiety.    TAHIR (generalized anxiety disorder)       carbamide peroxide 6.5 % otic solution    DEBROX     5 drops 2 times daily        clobetasol 0.05 % cream    TEMOVATE    60 g    Apply topically 2 times daily    Folliculitis       Colchicine 0.6 MG Caps     90 capsule    Take 0.6 mg by mouth See Admin Instructions 2 capsules in AM and 1 capsule in PM    Behcet's syndrome (H)       dexamethasone 4  MG/ML injection    DECADRON    30 mL    Apply 1 mL (4 mg) topically as needed    Sprain of other ligament of left ankle, initial encounter       diclofenac 1 % Gel topical gel    VOLTAREN    100 g    Apply 2-4 grams to affected area(s) up to 4 times per day as needed. This is an anti-inflammatory medication.    Polyarthralgia       dicyclomine 20 MG tablet    BENTYL    60 tablet    Take 1 tablet (20 mg) by mouth 4 times daily as needed    Abdominal cramping       diphenhydrAMINE 25 MG tablet    BENADRYL    30 tablet    Take 1 tablet (25 mg) by mouth every 8 hours as needed for allergies        EPINEPHrine 0.3 MG/0.3ML injection 2-pack    EPIPEN 2-EAMON    0.6 mL    Inject 0.3 mLs (0.3 mg) into the muscle as needed for anaphylaxis    Hx of bee sting allergy       EXCEDRIN MIGRAINE PO      Take by mouth as needed        guanFACINE 1 MG tablet    TENEX    180 tablet    Take 3 tablets (3 mg) by mouth twice daily in the morning and evening daily.    Tic       hyoscyamine 0.125 MG tablet    ANASPAZ/LEVSIN    40 tablet    Take 1-2 tablets (125-250 mcg) by mouth every 4 hours as needed for cramping    Abdominal pain, generalized       hypromellose 0.3 % Soln ophthalmic solution    GENTEAL     1 drop every hour as needed        LACTAID PO      Take by mouth daily        lactulose 20 GM/30ML Soln     300 mL    Take 30 mLs by mouth 3 times daily as needed        lidocaine 2 % topical gel    XYLOCAINE     Apply 1 Tube topically daily Reported on 4/21/2017        lidocaine visc 2% 2.5mL/5mL & maalox/mylanta w/ simeth 2.5mL/5mL & diphenhydrAMINE 5mg/5mL Susp suspension    [facility]    1 Bottle    Swish and swallow 10 mLs in mouth every 6 hours as needed for mouth sores    Behcet's syndrome (H)       linaclotide 290 MCG capsule    LINZESS    90 capsule    Take 1 capsule (290 mcg) by mouth every morning (before breakfast)    Irritable bowel syndrome       LORazepam 1 MG tablet    ATIVAN    90 tablet    Take 0.5 tablets  (0.5 mg) by mouth 2 times daily as needed for other (abdominal pain) Do not operate a vehicle after taking this medication    Chronic abdominal pain       meclizine 25 MG tablet    ANTIVERT    30 tablet    Take 1 tablet (25 mg) by mouth every 6 hours as needed for dizziness    Nausea       metaxalone 800 MG tablet    SKELAXIN    30 tablet    Take 0.5 tablets (400 mg) by mouth 3 times daily as needed for moderate pain    Sprain of neck, initial encounter, Cervical dystonia       norgestimate-ethinyl estradiol 0.25-35 MG-MCG per tablet    ORTHO-CYCLEN, SPRINTEC    84 tablet    Take 1 tablet by mouth daily    General counseling for prescription of oral contraceptives       omeprazole 40 MG capsule    priLOSEC    90 capsule    Take 1 capsule (40 mg) by mouth daily    Gastroesophageal reflux disease, esophagitis presence not specified       ondansetron 8 MG ODT tab    ZOFRAN-ODT    60 tablet    Take 1 tablet (8 mg) by mouth every 8 hours as needed for nausea    Non-intractable vomiting with nausea, unspecified vomiting type, Hematemesis, presence of nausea not specified       predniSONE 20 MG tablet    DELTASONE    10 tablet    Take two tablets (= 40mg) each day for 5 (five) days        promethazine 25 MG tablet    PHENERGAN    20 tablet    Take 0.5-1 tablets (12.5-25 mg) by mouth every 6 hours as needed for nausea    Intractable chronic migraine without aura and without status migrainosus       ranitidine 150 MG tablet    ZANTAC    8 tablet    Take 1 tablet (150 mg) by mouth 2 times daily for 4 days        sucralfate 1 GM/10ML suspension    CARAFATE    1200 mL    Take 10 mLs (1 g) by mouth 4 times daily    Bile reflux gastritis, Nausea       SUMAtriptan 100 MG tablet    IMITREX    18 tablet    Take 1 tablet (100 mg) by mouth at onset of headache for migraine May repeat in 2 hours. Max 2 tablets/24 hours.    Intractable chronic migraine without aura and without status migrainosus       triamcinolone 0.1 % cream     KENALOG    15 g    Apply sparingly to oral ulcers three times daily for 14 days as needed.    Behcet's syndrome (H)       * Notice:  This list has 5 medication(s) that are the same as other medications prescribed for you. Read the directions carefully, and ask your doctor or other care provider to review them with you.

## 2017-08-03 NOTE — TELEPHONE ENCOUNTER
8-3-2017 8:25am    When patient checked in for her PT appt today, she scheduled a 60-minute transfer appt with Dione Blue for 8/31/2017 at 9:00am, which proceeds her next PT appt that's scheduled for 10:00am. Patient was informed that the first appt with Dione Blue will be for 1 hour.    Rachelle [place]    [facility]

## 2017-08-04 NOTE — PROGRESS NOTES
"                                  [facility]   [facility] [place]  Behavioral Medicine Visit    Patient Name: Samara Oropeza    YOB: 1995   Medical Record Number: [id number]  Date: 8/3/2017                SUBJECTIVE: The patient on this date for an elective return appointment. Today the patient reports the following: Her pain level is mildly worse her mood is mildly worse activity level has been the same her stress level has been mildly worse and her sleep has been moderately worse.    Today the patient reports since our last meeting she has been in the ER ×2. The first was for a knee issue and the second for an allergic reaction. Patient today discusses anger and hurt feelings that she has about how others don't support her with her pain issues. Today we also discussed the idea of her being overwhelmed because of the chronicity and regularity of her pain concerns. Today we discussed how emotional overwhelmingness can benefit from learning skills such as grounding. Patient was instructed on multiple methods to accomplish grounding practices relative to her emotional state of mind.    Patient agrees to do writing that is neutral and origins to continue to work on skills associated with grounding. She refers to this as \"thickening my skin\" to assist her with her hurt feelings.          OBJECTIVE:   Length of Visit: 60 minutes      Assessment: Current Emotional / Mental Status    Appearance:   Appropriate   Eye Contact:   Fair   Psychomotor Behavior: Normal   Attitude:   Cooperative   Orientation:   All  Speech   Rate / Production: Monotone  Normal    Volume:  Soft   Mood:    Anxious  Depressed  Irritable   Affect:    Blunted  Flat  Subdued  Worrisome   Thought Content:  Clear   Thought Form:  Coherent  Logical   Insight:    Poor     ASSESSMENT:   Axis I: Chronic pain syndrome, polyarthralgia  Axis II: Dx deferred    Progress toward goals: as " expected.    Pain Status: worsened    Emotional Status: worsened   Medication / chemical use concerns: None    PLAN:   Next Appointment: Samara Oropeza will schedule a follow-up appointment in 3 weeks.  Assignment: Completed neutral writing long  Objectives / interventions for next session: Continue to work on grounding and emotional attachment.    Kimo Hudson MA, LP, [facility]  Pain Specialist  [facility]

## 2017-08-08 ENCOUNTER — OFFICE VISIT (OUTPATIENT)
Dept: BEHAVIORAL HEALTH | Facility: CLINIC | Age: 22
End: 2017-08-08
Payer: COMMERCIAL

## 2017-08-08 DIAGNOSIS — F43.10 PTSD (POST-TRAUMATIC STRESS DISORDER): ICD-10-CM

## 2017-08-08 DIAGNOSIS — M35.2 BEHCET'S DISEASE (H): ICD-10-CM

## 2017-08-08 DIAGNOSIS — F33.1 MODERATE EPISODE OF RECURRENT MAJOR DEPRESSIVE DISORDER (H): Primary | ICD-10-CM

## 2017-08-08 DIAGNOSIS — F41.1 GAD (GENERALIZED ANXIETY DISORDER): ICD-10-CM

## 2017-08-08 PROCEDURE — 90834 PSYTX W PT 45 MINUTES: CPT | Performed by: SOCIAL WORKER

## 2017-08-08 NOTE — MR AVS SNAPSHOT
MRN:[id number]                      After Visit Summary   8/8/2017    Samara Oropeza    MRN: [id number]           Visit Information        Provider Department      8/8/2017 10:30 AM Layla Browne LICSW [facility] Generic      Your next 10 appointments already scheduled     Aug 10, 2017 10:00 AM CDT   Return Visit with Emily Rollins PT   [facility] ([facility])    [address]  [address]  [facility] [address] [phone]            Aug 15, 2017  1:50 PM CDT   (Arrive by 1:35 PM)   LUIZ Extremity with TAINA [facility] ([facility])    [address] [address] [phone]            Aug 16, 2017  2:45 PM CDT   Return Visit with Cata Hurst NP   [facility] ([facility])    [address]  [address]  [facility] [address] [phone]            Aug 17, 2017 11:00 AM CDT   Return Visit with Kimo Hudson LP   [facility] ([facility])    [address] [place] [address] [phone]            Aug 17, 2017  1:45 PM CDT   Return Visit with Emily Rollins PT   [facility] ([facility])    [address]  [address]  [facility] [address] [phone]            Aug 22, 2017  7:20 AM CDT   LUIZ Extremity with TAINA [facility] ([facility])    [address] [address] [phone]            Aug 22, 2017 11:00 AM CDT   Return Visit with Austen Marquez MD   [facility] ([facility])    [address] [address] [phone]            Aug 31, 2017  9:00 AM CDT   Return Visit  "with EVI Dahl CNP   [facility] ([facility])    [address]  [address]  [facility] [address]   [phone]            Aug 31, 2017 10:00 AM CDT   Return Visit with Emily Rollins, PT   [facility] ([facility])    [address]  [address]  [facility] [address]   [phone]            Sep 05, 2017 12:30 PM CDT   [facility]   [facility] ([facility]    [address] [address] [phone]              MyChart Information     [employer] lets you send messages to your doctor, view your test results, renew your prescriptions, schedule appointments and more. To sign up, go to [email or web address] . Click on \"Log in\" on the left side of the screen, which will take you to the Welcome page. Then click on \"Sign up Now\" on the right side of the page.     You will be asked to enter the access code listed below, as well as some personal information. Please follow the directions to create your username and password.     Your access code is: [email or web address]  Expires: 10/29/2017 10:26 AM     Your access code will  in 90 days. If you need help or a new code, please call your [facility] [facility] clinic or [phone].        Care EveryWhere ID     This is your Care EveryWhere ID. This could be used by other organizations to access your [facility] [place] medical records  [id number]        Equal Access to Services     NABIL GALEANO : Hadcarlin Santiago, channing hurtado, mike maloney. So [facility] [phone].    ATENCIÓN: Si habla español, tiene a livingston disposición servicios gratuitos de asistencia lingüística. Llame al [phone].    We comply with applicable federal civil rights laws and [place] laws. We do not discriminate on the basis of race, color, national origin, age, " disability sex, sexual orientation or gender identity.

## 2017-08-08 NOTE — PROGRESS NOTES
"                                             Progress Note    Client Name: Samara Oropeza  Date: 8/8/2017               Service Type: Individual      Session Start Time: 10:35  Session End Time: 11:30      Session Length: 45     Session #: 15     Attendees: Client attended alone    Treatment Plan Last Reviewed: 6/27/2017    PHQ-9 / TAHIR-7 : 8/1/2017      DATA      Progress Since Last Session (Related to Symptoms / Goals / Homework):   Symptoms: Stable    Homework: Achieved / completed to satisfaction- up to full dose on her medications, has been going to mom's home      Episode of Care Goals: Satisfactory progress - ACTION (Actively working towards change); Intervened by reinforcing change plan / affirming steps taken     Current / Ongoing Stressors and Concerns:  Client reports that she has one ED visit once.  Had not had any contact with her father for about one month.  She did get an invitation to see her grandfather but couldn't.  Has her friend's bridal shower this weekend. Continues to have challenges with finances, she is working on getting paperwork to the appropriate places. Sleep and Appetite continues to be a challenge.  Encouraged her to focus on the self-care and staying on the Buspar for the next 30 days.  Acceptance around stressors not changing but how she manages them is in her control, focusing on caring for herself is important.     Treatment Objective(s) Addressed in This Session:   use cognitive strategies identified in therapy to challenge anxious thoughts  Increase restful sleep     Intervention:   CBT: breaking down tasks and setting up realistic goals        ASSESSMENT: Current Emotional / Mental Status (status of significant symptoms):   Risk status (Self / Other harm or suicidal ideation)   Client denies current fears or concerns for personal safety.   Client denies current or recent suicidal ideation or behaviors. Told her bf about passive SI thought- \"I want to go and be with my dog\" "    Client denies current or recent homicidal ideation or behaviors.   Client denies current or recent self injurious behavior or ideation.   Client denies other safety concerns.   A safety and risk management plan has not been developed at this time, however client was given the after-hours number / 911 should there be a change in any of these risk factors.     Appearance:   Appropriate    Eye Contact:   Fair    Psychomotor Behavior: Normal    Attitude:   Cooperative    Orientation:   All   Speech    Rate / Production: Monotone     Volume:  Soft    Mood:    Anxious  Depressed    Affect:    Subdued    Thought Content:  Clear    Thought Form:  Coherent  Logical    Insight:    Fair      Medication Review:   No changes to current psychiatric medication(s)     Medication Compliance:   Yes encouraged to start medications     Changes in Health Issues:   Yes: Pain, Associated Psychological Distress- started working with pain management     Chemical Use Review:   Substance Use: increase in alcohol .  Client reports frequency of use once on Sauturday which resulted in trip to [facility].  Provided encouragement towards sobriety        Tobacco Use: No current tobacco use.       Collateral Reports Completed:   Not Applicable    PLAN: (Client Tasks / Therapist Tasks / Other)  Homework:      1.   Continue with goals around self-care  2.  Acceptance around stressors of health, finances, and relationships.      Layla Browne, [facility]                                                         ________________________________________________________________________    Treatment Plan    Client's Name: Samara Oropeza  YOB: 1995    Date: 3/14/2017     DSM-V Diagnoses: 296.32 Major Depressive Disorder, Recurrent Episode, Moderate _ or 300.02 (F41.1) Generalized Anxiety Disorder  Psychosocial / Contextual Factors: Moderate- Minimal contact with family, Health issues  WHODAS: 30    Referral /  Collaboration:  Referral to another professional/service is not indicated at this time..    Anticipated number of session or this episode of care: 12      MeasurableTreatment Goal(s) related to diagnosis / functional impairment(s)  Goal 1: Client will decrease anxiety symptoms as eveidenced by self-report and TAHIR-7 scores, currently at 14, goal to 7 or below    I will know I've met my goal when I am better able to deal with it.      Objective #A (Client Action)    Client will use relaxation strategies 3x times per day to reduce the physical symptoms of anxiety.  Status: Continued - Date(s): 6/27/2017      Intervention(s)  Therapist will teach different relaxation strategies and have client complete one between session.    Objective #B  Client will use cognitive strategies identified in therapy to challenge anxious thoughts.  Status: Continued - Date(s): 6/27/2017      Intervention(s)  Therapist will 1.  introduce cognitive distortions; 2. build awareness for client; 3. leanr an implement diffferent cognitive restructuring techniques.    Objective #C  Client will increase her amount of restful sleep.  Status: Continued - Date(s): 6/27/2017      Intervention(s)  Therapist will 1. start a sleep journal; 2. Introduce techniques for falling and staying asleep.        Client has reviewed and agreed to the above plan.      Layla Browne, [facility]  March 14, 2017

## 2017-08-10 ENCOUNTER — OFFICE VISIT (OUTPATIENT)
Dept: PALLIATIVE MEDICINE | Facility: CLINIC | Age: 22
End: 2017-08-10
Payer: COMMERCIAL

## 2017-08-10 DIAGNOSIS — G89.4 CHRONIC PAIN SYNDROME: Primary | ICD-10-CM

## 2017-08-10 PROCEDURE — 97110 THERAPEUTIC EXERCISES: CPT | Mod: GP | Performed by: PHYSICAL THERAPIST

## 2017-08-10 PROCEDURE — 97112 NEUROMUSCULAR REEDUCATION: CPT | Mod: GP | Performed by: PHYSICAL THERAPIST

## 2017-08-10 PROCEDURE — 97530 THERAPEUTIC ACTIVITIES: CPT | Mod: GP | Performed by: PHYSICAL THERAPIST
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
## 2017-08-10 NOTE — MR AVS SNAPSHOT
After Visit Summary   8/10/2017    Samara Oropeza    MRN: [id number]           Patient Information     Date Of Birth          1995        Visit Information        Provider Department      8/10/2017 10:00 AM Emily Rollins, TAINA [facility]        Today's Diagnoses     Chronic pain syndrome    -  1       Follow-ups after your visit        Your next 10 appointments already scheduled     Aug 15, 2017  1:50 PM CDT   (Arrive by 1:35 PM)   L[facility] Extremity with TAINA [facility] ([facility])    [address] [place] [id number] [phone]            Aug 16, 2017  2:45 PM CDT   Return Visit with Cata Hurst NP   [facility] ([facility])    [address] [place] [id number] [phone]            Aug 17, 2017 11:00 AM CDT   Return Visit with Kimo Hudson LP   [facility] ([facility])    [id number] [place] [address] [id number] [phone]            Aug 17, 2017  1:45 PM CDT   Return Visit with Emily Rollins PT   [facility] ([facility])    [address]  [address] [place] [phone]            Aug 22, 2017 11:00 AM CDT   Return Visit with Austen Marquez MD   [facility] ([facility])    [address] [phone]            Aug 25, 2017  2:50 PM CDT   [facility] Extremity with [facility] ([facility])    [address] [place] [address] [phone]            Aug 31, 2017  9:00 AM CDT   Return Visit with EVI Dahl CNP   [facility] ([facility])    [address]  [address]  [address]  [facility] [address]   [phone]            Aug 31, 2017 10:00 AM CDT   Return Visit with Emily Rollins PT   [facility] ([facility])    [address]  [facility] [address]  [facility] [address] [phone]            Sep 05, 2017 12:30 PM CDT   Return Visit with ALISA Burt   [facility] ([facility])    [address] [address] [phone]            Sep 13, 2017  3:00 PM CDT   (Arrive by 2:45 PM)   Return Botox with Frederick Bender MD   [facility] ([facility])    [address] [facility]  [address] [facility] [address] [phone]              Who to contact     If you have questions or need follow up information about today's clinic visit or your schedule please contact [facility] directly at [phone].  Normal or non-critical lab and imaging results will be communicated to you by MyChart, letter or phone within 4 business days after the clinic has received the results. If you do not hear from us within 7 days, please contact the clinic through DINKlifehart or phone. If you have a critical or abnormal lab result, we will notify you by phone as soon as possible.  Submit refill requests through Drimki or call your pharmacy and they will forward the refill request to us. Please allow 3 business days for your refill to be completed.          Additional Information About Your Visit        DINKlifehart Information     Drimki lets you send messages to your doctor, view your test results, renew your prescriptions, schedule appointments and more. To sign up, go to [email or web address] . Click on \"Log in\" on the left side of the screen, which will take you to the Welcome page. Then click on \"Sign up Now\" on the right side of the page.     You will be asked to enter the access code listed below, " as well as some personal information. Please follow the directions to create your username and password.     Your access code is: [email or web address]  Expires: 10/29/2017 10:26 AM     Your access code will  in 90 days. If you need help or a new code, please call your [facility] clinic or [phone].        Care EveryWhere ID     This is your Care EveryWhere ID. This could be used by other organizations to access your [facility] medical records  [id number]         Blood Pressure from Last 3 Encounters:   17 123/85   17 118/83   17 100/60    Weight from Last 3 Encounters:   17 65.8 kg (145 lb)   17 66.2 kg (146 lb)   17 66.2 kg (146 lb)              We Performed the Following     NEUROMUSCULAR RE-EDUCATION     THERAPEUTIC ACTIVITIES     THERAPEUTIC EXERCISES        Primary Care Provider Office Phone # Fax #    Sonja EVI Laguerre [facility] [phone]       [address] [address]        Equal Access to Services     [facility] Hospital[facility]IL[facility] : Hadii aad ku hadasho Soomaali, waaxda luqadaha, qaybta kaalmada adeonel, mike rodriguez . So [facility] [phone].    ATENCIÓN: Si habla español, tiene a livingston disposición servicios gratuitos de asistencia lingüística. Jesus al [phone].    We comply with applicable federal civil rights laws and [place] laws. We do not discriminate on the basis of race, color, national origin, age, disability sex, sexual orientation or gender identity.            Thank you!     Thank you for choosing [facility]  for your care. Our goal is always to provide you with excellent care. Hearing back from our patients is one way we can continue to improve our services. Please take a few minutes to complete the written survey that you may receive in the mail after your visit with us. Thank you!             Your Updated Medication List - Protect others around you: Learn how to safely use, store and  throw away your medicines at www.disposemymeds.org.          This list is accurate as of: 8/10/17 11:57 AM.  Always use your most recent med list.                   Brand Name Dispense Instructions for use Diagnosis    albuterol 108 (90 BASE) MCG/ACT Inhaler    PROAIR HFA/PROVENTIL HFA/VENTOLIN HFA    1 Inhaler    Inhale 2 puffs into the lungs every 6 hours as needed for shortness of breath / dyspnea or wheezing    Atypical chest pain       amitriptyline 25 MG tablet    ELAVIL    45 tablet    Take 1 tablet (25 mg) by mouth At Bedtime    Atypical chest pain, Insomnia, unspecified type       apremilast 30 MG tablet    OTEZLA    60 tablet    20 mg in AM and 30mg in PM daily X 2 weeks and then 30mg twice daily.    Behcet's disease (H)       ASPIRIN CHILDRENS 81 MG chewable tablet   Generic drug:  aspirin      Take 81 mg by mouth as needed        betamethasone valerate 0.1 % cream    VALISONE     Apply topically 2 times daily        * botulinum toxin type A 100 UNITS injection    BOTOX    200 Units    Inject 200 Units into the muscle every 3 months    Cervical dystonia       * BOTOX IJ      Inject 150 Units into the muscle once Lot: /C3 Exp: 05/2019        * BOTOX IJ      Inject 150 Units into the muscle Lot # /C3  Exp: 8/2019        * BOTOX IJ      Inject 162.5 Units as directed once Lot #: /C3 Exp: 10/2019        * ONABOTULINUMTOXINA IJ      Inject 165 Units as directed once Lot # /C3 Expiration Date: 01/2020        busPIRone 10 MG tablet    BUSPAR    60 tablet    Start 1/2 tablet by mouth two times per day for one week, then increase to 1 tablet by mouth two times per day. For anxiety.    TAHIR (generalized anxiety disorder)       carbamide peroxide 6.5 % otic solution    DEBROX     5 drops 2 times daily        clobetasol 0.05 % cream    TEMOVATE    60 g    Apply topically 2 times daily    Folliculitis       Colchicine 0.6 MG Caps     90 capsule    Take 0.6 mg by mouth See Admin Instructions 2  capsules in AM and 1 capsule in PM    Behcet's syndrome (H)       dexamethasone 4 MG/ML injection    DECADRON    30 mL    Apply 1 mL (4 mg) topically as needed    Sprain of other ligament of left ankle, initial encounter       diclofenac 1 % Gel topical gel    VOLTAREN    100 g    Apply 2-4 grams to affected area(s) up to 4 times per day as needed. This is an anti-inflammatory medication.    Polyarthralgia       dicyclomine 20 MG tablet    BENTYL    60 tablet    Take 1 tablet (20 mg) by mouth 4 times daily as needed    Abdominal cramping       diphenhydrAMINE 25 MG tablet    BENADRYL    30 tablet    Take 1 tablet (25 mg) by mouth every 8 hours as needed for allergies        EPINEPHrine 0.3 MG/0.3ML injection 2-pack    EPIPEN 2-EAMON    0.6 mL    Inject 0.3 mLs (0.3 mg) into the muscle as needed for anaphylaxis    Hx of bee sting allergy       EXCEDRIN MIGRAINE PO      Take by mouth as needed        guanFACINE 1 MG tablet    TENEX    180 tablet    Take 3 tablets (3 mg) by mouth twice daily in the morning and evening daily.    Tic       hyoscyamine 0.125 MG tablet    ANASPAZ/LEVSIN    40 tablet    Take 1-2 tablets (125-250 mcg) by mouth every 4 hours as needed for cramping    Abdominal pain, generalized       hypromellose 0.3 % Soln ophthalmic solution    GENTEAL     1 drop every hour as needed        LACTAID PO      Take by mouth daily        lactulose 20 GM/30ML Soln     300 mL    Take 30 mLs by mouth 3 times daily as needed        lidocaine 2 % topical gel    XYLOCAINE     Apply 1 Tube topically daily Reported on 4/21/2017        lidocaine visc 2% 2.5mL/5mL & maalox/mylanta w/ simeth 2.5mL/5mL & diphenhydrAMINE 5mg/5mL Susp suspension    [facility] Mouthwash [facility]    1 Bottle    Swish and swallow 10 mLs in mouth every 6 hours as needed for mouth sores    Behcet's syndrome (H)       linaclotide 290 MCG capsule    LINZESS    90 capsule    Take 1 capsule (290 mcg) by mouth every morning (before breakfast)    Irritable  bowel syndrome       LORazepam 1 MG tablet    ATIVAN    90 tablet    Take 0.5 tablets (0.5 mg) by mouth 2 times daily as needed for other (abdominal pain) Do not operate a vehicle after taking this medication    Chronic abdominal pain       meclizine 25 MG tablet    ANTIVERT    30 tablet    Take 1 tablet (25 mg) by mouth every 6 hours as needed for dizziness    Nausea       metaxalone 800 MG tablet    SKELAXIN    30 tablet    Take 0.5 tablets (400 mg) by mouth 3 times daily as needed for moderate pain    Sprain of neck, initial encounter, Cervical dystonia       norgestimate-ethinyl estradiol 0.25-35 MG-MCG per tablet    ORTHO-CYCLEN, SPRINTEC    84 tablet    Take 1 tablet by mouth daily    General counseling for prescription of oral contraceptives       omeprazole 40 MG capsule    priLOSEC    90 capsule    Take 1 capsule (40 mg) by mouth daily    Gastroesophageal reflux disease, esophagitis presence not specified       ondansetron 8 MG ODT tab    ZOFRAN-ODT    60 tablet    Take 1 tablet (8 mg) by mouth every 8 hours as needed for nausea    Non-intractable vomiting with nausea, unspecified vomiting type, Hematemesis, presence of nausea not specified       predniSONE 20 MG tablet    DELTASONE    10 tablet    Take two tablets (= 40mg) each day for 5 (five) days        promethazine 25 MG tablet    PHENERGAN    20 tablet    Take 0.5-1 tablets (12.5-25 mg) by mouth every 6 hours as needed for nausea    Intractable chronic migraine without aura and without status migrainosus       sucralfate 1 GM/10ML suspension    CARAFATE    1200 mL    Take 10 mLs (1 g) by mouth 4 times daily    Bile reflux gastritis, Nausea       SUMAtriptan 100 MG tablet    IMITREX    18 tablet    Take 1 tablet (100 mg) by mouth at onset of headache for migraine May repeat in 2 hours. Max 2 tablets/24 hours.    Intractable chronic migraine without aura and without status migrainosus       triamcinolone 0.1 % cream    KENALOG    15 g    Apply sparingly  to oral ulcers three times daily for 14 days as needed.    Behcet's syndrome (H)       * Notice:  This list has 5 medication(s) that are the same as other medications prescribed for you. Read the directions carefully, and ask your doctor or other care provider to review them with you.

## 2017-08-12 ENCOUNTER — HEALTH MAINTENANCE LETTER (OUTPATIENT)
Age: 22
End: 2017-08-12

## 2017-08-16 ENCOUNTER — OFFICE VISIT (OUTPATIENT)
Dept: PSYCHIATRY | Facility: CLINIC | Age: 22
End: 2017-08-16
Payer: COMMERCIAL

## 2017-08-16 VITALS
BODY MASS INDEX: 25.69 KG/M2 | WEIGHT: 145 LBS | HEART RATE: 105 BPM | TEMPERATURE: 98.6 F | SYSTOLIC BLOOD PRESSURE: 102 MMHG | OXYGEN SATURATION: 99 % | DIASTOLIC BLOOD PRESSURE: 60 MMHG

## 2017-08-16 DIAGNOSIS — F41.1 GAD (GENERALIZED ANXIETY DISORDER): ICD-10-CM

## 2017-08-16 PROCEDURE — 99214 OFFICE O/P EST MOD 30 MIN: CPT | Performed by: NURSE PRACTITIONER

## 2017-08-16 RX ORDER — HYDROXYZINE HYDROCHLORIDE 25 MG/1
25-50 TABLET, FILM COATED ORAL EVERY 6 HOURS PRN
Qty: 60 TABLET | Refills: 1 | Status: SHIPPED | OUTPATIENT
Start: 2017-08-16 | End: 2017-10-09

## 2017-08-16 RX ORDER — BUSPIRONE HYDROCHLORIDE 30 MG/1
15 TABLET ORAL 2 TIMES DAILY
Qty: 60 TABLET | Refills: 1 | Status: SHIPPED | OUTPATIENT
Start: 2017-08-16 | End: 2017-09-19

## 2017-08-16 ASSESSMENT — PATIENT HEALTH QUESTIONNAIRE - PHQ9
SUM OF ALL RESPONSES TO PHQ QUESTIONS 1-9: 16
5. POOR APPETITE OR OVEREATING: NEARLY EVERY DAY

## 2017-08-16 ASSESSMENT — ANXIETY QUESTIONNAIRES
3. WORRYING TOO MUCH ABOUT DIFFERENT THINGS: NEARLY EVERY DAY
1. FEELING NERVOUS, ANXIOUS, OR ON EDGE: NEARLY EVERY DAY
IF YOU CHECKED OFF ANY PROBLEMS ON THIS QUESTIONNAIRE, HOW DIFFICULT HAVE THESE PROBLEMS MADE IT FOR YOU TO DO YOUR WORK, TAKE CARE OF THINGS AT HOME, OR GET ALONG WITH OTHER PEOPLE: VERY DIFFICULT
5. BEING SO RESTLESS THAT IT IS HARD TO SIT STILL: MORE THAN HALF THE DAYS
6. BECOMING EASILY ANNOYED OR IRRITABLE: MORE THAN HALF THE DAYS
2. NOT BEING ABLE TO STOP OR CONTROL WORRYING: NEARLY EVERY DAY
GAD7 TOTAL SCORE: 17
7. FEELING AFRAID AS IF SOMETHING AWFUL MIGHT HAPPEN: SEVERAL DAYS

## 2017-08-16 NOTE — MR AVS SNAPSHOT
After Visit Summary   8/16/2017    Samara Oropeza    MRN: [id number]           Patient Information     Date Of Birth          1995        Visit Information        Provider Department      8/16/2017 2:45 PM Cata Hurst NP [facility]        Today's Diagnoses     TAHIR (generalized anxiety disorder)          Care Instructions    Treatment Plan:    Continue Intuniv (guanfacine) 3 mg two times per day per primary care provider.    Continue Elavil (amitriptyline) 50 mg by mouth daily at bedtime.     Continue Ativan (lorazepam) as needed for anxiety per primary care provider.     Increase Buspar (buspirone) 15 mg by mouth 2 times per day. If needed, may consider dose increase.     Start Vistaril/Atarax (hydroxyzine) 25- 50 mg by mouth up to 4 times daily for anxiety.     Continue all other medications as reviewed per electronic medical record today.     All questions addressed. Education and counseling completed regarding risks and benefits of medications and psychotherapy options.    Safety plan was reviewed. To the Emergency Department as needed or call after hours crisis line at [phone] or [phone].     Continue individual/group therapy as planned with Layla Browne.     Schedule an appointment with me in 6-8 weeks or sooner as needed. Call [facility] at [phone] to schedule.    Follow up with primary care provider as planned or for acute medical concerns.    Call the psychiatric nurse line with medication questions or concerns at [phone].    My Practice Policy was reviewed and signed: YES     MyChart may be used to communicate with your provider, but this is not intended to be used for emergencies.          Follow-ups after your visit        Follow-up notes from your care team     Return in about 6 weeks (around 9/27/2017).      Your next 10 appointments already scheduled     Aug 17, 2017 11:00 AM CDT   Return Visit with Kimo Hudson  [facility] ([facility])    [address] [facility] [id number] [phone]            Aug 17, 2017  1:45 PM CDT   Return Visit with Emily Rollins PT   [facility] ([facility])    [address]  [facility] [id number] [phone]            Aug 22, 2017 11:00 AM CDT   Return Visit with Austen Marquez MD   [facility] ([facility])    [address] [id number] [phone]            Aug 25, 2017  2:50 PM CDT   (Arrive by 2:35 PM)   LUIZ Extremity with Jennifer Alvarenga PT   [facility] ([facility])    [address] [place] [id number] [phone]            Aug 30, 2017  2:50 PM CDT   LUIZ Extremity with Jennifer Alvarenga PT   [facility] ([facility] [place])    [address] [facility] [address] [facility] [place] [id number] [phone]            Aug 31, 2017  9:00 AM CDT   Return Visit with EVI Dahl CNP   [facility] ([facility])    [address]  [facility] [id number] [phone]            Aug 31, 2017 10:00 AM CDT   Return Visit with Emily Rollins PT   [facility] ([facility])    [address]  [address]  [facility] [address] [phone]            Sep 05, 2017 12:30 PM CDT   Return Visit with ALISA Burt   [facility] ([facility])    [address] [id number] [phone]            Sep 05, 2017  3:50 PM CDT   LUIZ Extremity with Jennifer Alvarenga PT   [facility] ([facility] [place])    [address] [facility] [address] [facility] [place] [address] [phone]          "   Sep 13, 2017  3:00 PM CDT   (Arrive by 2:45 PM)   Return Botox with Frederick Bender MD   [facility] ([facility] [place])    [address] [place] [address] [phone]              Who to contact     If you have questions or need follow up information about today's clinic visit or your schedule please contact [facility] directly at [phone].  Normal or non-critical lab and imaging results will be communicated to you by MyChart, letter or phone within 4 business days after the clinic has received the results. If you do not hear from us within 7 days, please contact the clinic through [facility] or phone. If you have a critical or abnormal lab result, we will notify you by phone as soon as possible.  Submit refill requests through [facility] or call your pharmacy and they will forward the refill request to us. Please allow 3 business days for your refill to be completed.          Additional Information About Your Visit        [employer] Information     [facility] lets you send messages to your doctor, view your test results, renew your prescriptions, schedule appointments and more. To sign up, go to [email or web address] . Click on \"Log in\" on the left side of the screen, which will take you to the Welcome page. Then click on \"Sign up Now\" on the right side of the page.     You will be asked to enter the access code listed below, as well as some personal information. Please follow the directions to create your username and password.     Your access code is: [email or web address]  Expires: 10/29/2017 10:26 AM     Your access code will  in 90 days. If you need help or a new code, please call your [facility] clinic or [phone].        Care EveryWhere ID     This is your Care EveryWhere ID. This could be used by other organizations to access your [facility] medical records  [id number]        Your Vitals Were     Pulse " Temperature Last Period Pulse Oximetry BMI (Body Mass Index)       105 98.6  F (37  C) (Oral) 07/31/2017 99% 25.69 kg/m2        Blood Pressure from Last 3 Encounters:   08/16/17 102/60   08/01/17 123/85   07/31/17 118/83    Weight from Last 3 Encounters:   08/16/17 145 lb (65.8 kg)   07/31/17 145 lb (65.8 kg)   06/27/17 146 lb (66.2 kg)              Today, you had the following     No orders found for display         Today's Medication Changes          These changes are accurate as of: 8/16/17  3:27 PM.  If you have any questions, ask your nurse or doctor.               Start taking these medicines.        Dose/Directions    hydrOXYzine 25 MG tablet   Commonly known as:  ATARAX   Used for:  TAHIR (generalized anxiety disorder)   Started by:  Cata Hurst NP        Dose:  25-50 mg   Take 1-2 tablets (25-50 mg) by mouth every 6 hours as needed for anxiety   Quantity:  60 tablet   Refills:  1         These medicines have changed or have updated prescriptions.        Dose/Directions    amitriptyline 25 MG tablet   Commonly known as:  ELAVIL   This may have changed:  how much to take   Used for:  Atypical chest pain, Insomnia, unspecified type        Dose:  25 mg   Take 1 tablet (25 mg) by mouth At Bedtime   Quantity:  45 tablet   Refills:  5       BusPIRone HCl 30 MG Tabs   This may have changed:    - medication strength  - how much to take  - how to take this  - when to take this  - additional instructions   Used for:  TAHIR (generalized anxiety disorder)   Changed by:  Cata Hurst NP        Dose:  15 mg   Take 15 mg by mouth 2 times daily Increased dose. For anxiety.   Quantity:  60 tablet   Refills:  1            Where to get your medicines      These medications were sent to [facility] - [address], [facility] [address]     Phone:  [phone]     BusPIRone HCl 30 MG Tabs    hydrOXYzine 25 MG tablet                Primary Care  Provider Office Phone # Fax #    EVI Cardona [phone]       [address]  [facility] [address]        Equal Access to Services     NABIL GALEANO : Hadii aad ku hadprimoo Soomaali, waaxda luqadaha, qaybta kaalmada adeegyada, waxjarred lennonn maddie crane laRobertarlet parsons. So [facility] [phone].    ATENCIÓN: Si habla español, tiene a [place] disposición servicios gratuitos de asistencia lingüística. Llame al [phone].    We comply with applicable federal civil rights laws and [place] laws. We do not discriminate on the basis of race, color, national origin, age, disability sex, sexual orientation or gender identity.            Thank you!     Thank you for choosing [facility]  for your care. Our goal is always to provide you with excellent care. Hearing back from our patients is one way we can continue to improve our services. Please take a few minutes to complete the written survey that you may receive in the mail after your visit with us. Thank you!             Your Updated Medication List - Protect others around you: Learn how to safely use, store and throw away your medicines at www.disposemymeds.org.          This list is accurate as of: 8/16/17  3:27 PM.  Always use your most recent med list.                   Brand Name Dispense Instructions for use Diagnosis    albuterol 108 (90 BASE) MCG/ACT Inhaler    PROAIR HFA/PROVENTIL HFA/VENTOLIN HFA    1 Inhaler    Inhale 2 puffs into the lungs every 6 hours as needed for shortness of breath / dyspnea or wheezing    Atypical chest pain       amitriptyline 25 MG tablet    ELAVIL    45 tablet    Take 1 tablet (25 mg) by mouth At Bedtime    Atypical chest pain, Insomnia, unspecified type       apremilast 30 MG tablet    OTEZLA    60 tablet    20 mg in AM and 30mg in PM daily X 2 weeks and then 30mg twice daily.    Behcet's disease (H)       ASPIRIN CHILDRENS 81 MG chewable tablet   Generic drug:  aspirin      Take 81 mg  by mouth as needed        betamethasone valerate 0.1 % cream    VALISONE     Apply topically 2 times daily        * botulinum toxin type A 100 UNITS injection    BOTOX    200 Units    Inject 200 Units into the muscle every 3 months    Cervical dystonia       * BOTOX IJ      Inject 150 Units into the muscle once Lot: /C3 Exp: 05/2019        * BOTOX IJ      Inject 150 Units into the muscle Lot # /C3  Exp: 8/2019        * BOTOX IJ      Inject 162.5 Units as directed once Lot #: /C3 Exp: 10/2019        * ONABOTULINUMTOXINA IJ      Inject 165 Units as directed once Lot # /C3 Expiration Date: 01/2020        BusPIRone HCl 30 MG Tabs     60 tablet    Take 15 mg by mouth 2 times daily Increased dose. For anxiety.    TAHIR (generalized anxiety disorder)       carbamide peroxide 6.5 % otic solution    DEBROX     5 drops 2 times daily        clobetasol 0.05 % cream    TEMOVATE    60 g    Apply topically 2 times daily    Folliculitis       Colchicine 0.6 MG Caps     90 capsule    Take 0.6 mg by mouth See Admin Instructions 2 capsules in AM and 1 capsule in PM    Behcet's syndrome (H)       diclofenac 1 % Gel topical gel    VOLTAREN    100 g    Apply 2-4 grams to affected area(s) up to 4 times per day as needed. This is an anti-inflammatory medication.    Polyarthralgia       dicyclomine 20 MG tablet    BENTYL    60 tablet    Take 1 tablet (20 mg) by mouth 4 times daily as needed    Abdominal cramping       diphenhydrAMINE 25 MG tablet    BENADRYL    30 tablet    Take 1 tablet (25 mg) by mouth every 8 hours as needed for allergies        EPINEPHrine 0.3 MG/0.3ML injection 2-pack    EPIPEN 2-EAMON    0.6 mL    Inject 0.3 mLs (0.3 mg) into the muscle as needed for anaphylaxis    Hx of bee sting allergy       EXCEDRIN MIGRAINE PO      Take by mouth as needed        guanFACINE 1 MG tablet    TENEX    180 tablet    Take 3 tablets (3 mg) by mouth twice daily in the morning and evening daily.    Tic       hydrOXYzine 25  MG tablet    ATARAX    60 tablet    Take 1-2 tablets (25-50 mg) by mouth every 6 hours as needed for anxiety    TAHIR (generalized anxiety disorder)       hyoscyamine 0.125 MG tablet    ANASPAZ/LEVSIN    40 tablet    Take 1-2 tablets (125-250 mcg) by mouth every 4 hours as needed for cramping    Abdominal pain, generalized       hypromellose 0.3 % Soln ophthalmic solution    GENTEAL     1 drop every hour as needed        LACTAID PO      Take by mouth daily        lactulose 20 GM/30ML Soln     300 mL    Take 30 mLs by mouth 3 times daily as needed        lidocaine 2 % topical gel    XYLOCAINE     Apply 1 Tube topically daily Reported on 4/21/2017        lidocaine visc 2% 2.5mL/5mL & maalox/mylanta w/ simeth 2.5mL/5mL & diphenhydrAMINE 5mg/5mL Susp suspension    [facility] Mouthwash [facility]    1 Bottle    Swish and swallow 10 mLs in mouth every 6 hours as needed for mouth sores    Behcet's syndrome (H)       linaclotide 290 MCG capsule    LINZESS    90 capsule    Take 1 capsule (290 mcg) by mouth every morning (before breakfast)    Irritable bowel syndrome       LORazepam 1 MG tablet    ATIVAN    90 tablet    Take 0.5 tablets (0.5 mg) by mouth 2 times daily as needed for other (abdominal pain) Do not operate a vehicle after taking this medication    Chronic abdominal pain       meclizine 25 MG tablet    ANTIVERT    30 tablet    Take 1 tablet (25 mg) by mouth every 6 hours as needed for dizziness    Nausea       metaxalone 800 MG tablet    SKELAXIN    30 tablet    Take 0.5 tablets (400 mg) by mouth 3 times daily as needed for moderate pain    Sprain of neck, initial encounter, Cervical dystonia       norgestimate-ethinyl estradiol 0.25-35 MG-MCG per tablet    ORTHO-CYCLEN, SPRINTEC    84 tablet    Take 1 tablet by mouth daily    General counseling for prescription of oral contraceptives       omeprazole 40 MG capsule    priLOSEC    90 capsule    Take 1 capsule (40 mg) by mouth daily    Gastroesophageal reflux disease,  esophagitis presence not specified       ondansetron 8 MG ODT tab    ZOFRAN-ODT    60 tablet    Take 1 tablet (8 mg) by mouth every 8 hours as needed for nausea    Non-intractable vomiting with nausea, unspecified vomiting type, Hematemesis, presence of nausea not specified       promethazine 25 MG tablet    PHENERGAN    20 tablet    Take 0.5-1 tablets (12.5-25 mg) by mouth every 6 hours as needed for nausea    Intractable chronic migraine without aura and without status migrainosus       sucralfate 1 GM/10ML suspension    CARAFATE    1200 mL    Take 10 mLs (1 g) by mouth 4 times daily    Bile reflux gastritis, Nausea       triamcinolone 0.1 % cream    KENALOG    15 g    Apply sparingly to oral ulcers three times daily for 14 days as needed.    Behcet's syndrome (H)       * Notice:  This list has 5 medication(s) that are the same as other medications prescribed for you. Read the directions carefully, and ask your doctor or other care provider to review them with you.

## 2017-08-16 NOTE — PATIENT INSTRUCTIONS
Treatment Plan:    Continue Intuniv (guanfacine) 3 mg two times per day per primary care provider.    Continue Elavil (amitriptyline) 50 mg by mouth daily at bedtime.     Continue Ativan (lorazepam) as needed for anxiety per primary care provider.     Increase Buspar (buspirone) 15 mg by mouth 2 times per day. If needed, may consider dose increase.     Start Vistaril/Atarax (hydroxyzine) 25- 50 mg by mouth up to 4 times daily for anxiety.     Continue all other medications as reviewed per electronic medical record today.     All questions addressed. Education and counseling completed regarding risks and benefits of medications and psychotherapy options.    Safety plan was reviewed. To the Emergency Department as needed or call after hours crisis line at [phone] or [phone].     Continue individual/group therapy as planned with Layla Browne.     Schedule an appointment with me in 6-8 weeks or sooner as needed. Call [facility] at [phone] to schedule.    Follow up with primary care provider as planned or for acute medical concerns.    Call the psychiatric nurse line with medication questions or concerns at [phone].    My Practice Policy was reviewed and signed: YES     MyChart may be used to communicate with your provider, but this is not intended to be used for emergencies.

## 2017-08-16 NOTE — PROGRESS NOTES
"    Outpatient Psychiatric Progress Note    Name: Samara Oropeza   : 1995                    Primary Care Provider: EVI Cardona CNP - last visit 2017  Therapist: Layla Browne  - last visit 2017  [facility]  Neurologist: John Messer    PHQ-9 scores:  PHQ-9 SCORE 2017   Total Score 17 17 16       TAHIR-7 scores:  TAHIR-7 SCORE 2017   Total Score 19 18 17       Patient Identification:  Patient is a 21 year old year old, partnered / significant other  Two or more races American female  who presents for return visit with me.  Patient is currently employed part time. Patient attended the session with Boyfriend , who they agreed to have interview with. Patient prefers to be called: \"Samara\"    Interim History:    I last saw Samara Oropeza for outpatient psychiatry Consultation on 2017.     During that appointment, we Start Buspar (buspirone) 5 mg by mouth two times per day for one week, then increase to 10 mg by mouth two times per day. For anxiety.     Continue Intuniv (guanfacine) 3 mg two times per day per primary care provider.    Continue Ativan (lorazepam) per primary care provider.      Current medications include:   Current Outpatient Prescriptions   Medication Sig     predniSONE (DELTASONE) 20 MG tablet Take two tablets (= 40mg) each day for 5 (five) days     diphenhydrAMINE (BENADRYL) 25 MG tablet Take 1 tablet (25 mg) by mouth every 8 hours as needed for allergies     linaclotide (LINZESS) 290 MCG capsule Take 1 capsule (290 mcg) by mouth every morning (before breakfast)     busPIRone (BUSPAR) 10 MG tablet Start 1/2 tablet by mouth two times per day for one week, then increase to 1 tablet by mouth two times per day. For anxiety.     LORazepam (ATIVAN) 1 MG tablet Take 0.5 tablets (0.5 mg) by mouth 2 times daily as needed for other (abdominal pain) Do not operate a vehicle after taking this medication     " ONABOTULINUMTOXINA IJ Inject 165 Units as directed once Lot # /C3  Expiration Date: 01/2020     guanFACINE (TENEX) 1 MG tablet Take 3 tablets (3 mg) by mouth twice daily in the morning and evening daily.     lactulose 20 GM/30ML SOLN Take 30 mLs by mouth 3 times daily as needed     sucralfate (CARAFATE) 1 GM/10ML suspension Take 10 mLs (1 g) by mouth 4 times daily     diclofenac (VOLTAREN) 1 % GEL topical gel Apply 2-4 grams to affected area(s) up to 4 times per day as needed. This is an anti-inflammatory medication.     metaxalone (SKELAXIN) 800 MG tablet Take 0.5 tablets (400 mg) by mouth 3 times daily as needed for moderate pain     aspirin (ASPIRIN CHILDRENS) 81 MG chewable tablet Take 81 mg by mouth as needed      dicyclomine (BENTYL) 20 MG tablet Take 1 tablet (20 mg) by mouth 4 times daily as needed     amitriptyline (ELAVIL) 25 MG tablet Take 1 tablet (25 mg) by mouth At Bedtime (Patient taking differently: Take 50 mg by mouth At Bedtime )     omeprazole (PRILOSEC) 40 MG capsule Take 1 capsule (40 mg) by mouth daily     EPINEPHrine (EPIPEN 2-EAMON) 0.3 MG/0.3ML injection Inject 0.3 mLs (0.3 mg) into the muscle as needed for anaphylaxis     apremilast (OTEZLA) 30 MG tablet 20 mg in AM and 30mg in PM daily X 2 weeks and then 30mg twice daily.     Colchicine 0.6 MG CAPS Take 0.6 mg by mouth See Admin Instructions 2 capsules in AM and 1 capsule in PM     OnabotulinumtoxinA (BOTOX IJ) Inject 162.5 Units as directed once Lot #: /C3  Exp: 10/2019     norgestimate-ethinyl estradiol (ORTHO-CYCLEN, SPRINTEC) 0.25-35 MG-MCG per tablet Take 1 tablet by mouth daily     albuterol (PROAIR HFA/PROVENTIL HFA/VENTOLIN HFA) 108 (90 BASE) MCG/ACT Inhaler Inhale 2 puffs into the lungs every 6 hours as needed for shortness of breath / dyspnea or wheezing     OnabotulinumtoxinA (BOTOX IJ) Inject 150 Units into the muscle Lot # /C3  Exp: 8/2019     promethazine (PHENERGAN) 25 MG tablet Take 0.5-1 tablets (12.5-25 mg)  by mouth every 6 hours as needed for nausea     meclizine (ANTIVERT) 25 MG tablet Take 1 tablet (25 mg) by mouth every 6 hours as needed for dizziness     Magic Mouthwash (FV std formula) lidocaine visc 2% 2.5mL/5mL & maalox/mylanta w/ simeth 2.5mL/5mL & diphenhydrAMINE 5mg/5mL Swish and swallow 10 mLs in mouth every 6 hours as needed for mouth sores     clobetasol (TEMOVATE) 0.05 % cream Apply topically 2 times daily     OnabotulinumtoxinA (BOTOX IJ) Inject 150 Units into the muscle once Lot: /C3 Exp: 05/2019     botulinum toxin type A (BOTOX) 100 UNITS injection Inject 200 Units into the muscle every 3 months     hyoscyamine (ANASPAZ,LEVSIN) 0.125 MG tablet Take 1-2 tablets (125-250 mcg) by mouth every 4 hours as needed for cramping     Aspirin-Acetaminophen-Caffeine (EXCEDRIN MIGRAINE PO) Take by mouth as needed      hypromellose (GENTEAL) 0.3 % SOLN 1 drop every hour as needed     betamethasone valerate (VALISONE) 0.1 % cream Apply topically 2 times daily     carbamide peroxide (DEBROX) 6.5 % otic solution 5 drops 2 times daily     Lactase (LACTAID PO) Take by mouth daily     lidocaine (XYLOCAINE) 2 % jelly Apply 1 Tube topically daily Reported on 4/21/2017     triamcinolone (KENALOG) 0.1 % cream Apply sparingly to oral ulcers three times daily for 14 days as needed.     ondansetron (ZOFRAN-ODT) 8 MG ODT tab Take 1 tablet (8 mg) by mouth every 8 hours as needed for nausea     No current facility-administered medications for this visit.        The [place] Prescription Monitoring Program has been reviewed and there are no concerns about diversionary activity for controlled substances at this time.  Ativan (lorazepam) 1 mg, 90 tablets filled by Sonja Abreu PCP over last one year. Most recent fill by [facility] 90 tablets on 6/23/2017.    I was able to review most recent Primary Care Provider, specialty provider, and therapy visit notes that I have access to.     Samara Oropeza reports mood has been:  "\"more weldon\" boyfriend says she is \"more emotional and irritable.\" Reports Tourette symptoms have been worse due to more anxiety.   Patient reports that she thinks her chest pain has increased since starting the Buspar (buspirone).   Anxiety has been: \"pretty high\" reports nothing helps. Patient reports Ativan (lorazepam) is not helping.   Sleep has been: \"hard to fall and stay asleep\" nightmares more vivid. Uses Benadryl (diphenhydramine) for sleep.   PHQ9 and GAD7 scores were reviewed today.   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   Medication side effects: Denies  Current stressors include: Occupational Difficulties, Medical Difficulties, Loss of Family Pet, Tomorrow is Anniversary of Cousin's Death by Suicide tomorrow  Coping mechanisms and supports include: Therapy, Animal Rescue    Past Medical History:   Diagnosis Date     Anxiety      Arthritis      Behcet's disease (H)      Cervical adenitis May 2010     Chronic abdominal pain      Constipation, chronic 1994     Gastro-oesophageal reflux disease      Gastroparesis      Migraines      Neuromuscular disorder (H)     fibramyalgia     Palpitations      Seizure (H)      Seizures (H)     unknown etiology     Syncope      Tourette's       has a past medical history of Anxiety; Arthritis; Behcet's disease (H); Cervical adenitis (May 2010); Chronic abdominal pain; Constipation, chronic (1994); Gastro-oesophageal reflux disease; Gastroparesis; Migraines; Neuromuscular disorder (H); Palpitations; Seizure (H); Seizures (H); Syncope; and Tourette's.    Social History:  Current Living situation:  [place], [place] with boyfriend . Feels safe at home.  Current use of drugs or alcohol: Alcohol recent binge drinking episode of 10 shots and got sick, ambulance was called, so drinking less alcohol lately.   Tobacco use: No  Caffeine: No  Recovery Programming Involvement: Not Applicable    Vital Signs:   /60 (BP Location: Right arm, Patient " "Position: Chair, Cuff Size: Adult Regular)  Pulse 105  Temp 98.6  F (37  C) (Oral)  Wt 145 lb (65.8 kg)  LMP 07/31/2017  SpO2 99%  BMI 25.69 kg/m2    Labs:  Most recent laboratory results reviewed and the pertinent results include:   Admission on 07/31/2017, Discharged on 08/01/2017   Component Date Value Ref Range Status     Interpretation ECG 07/31/2017 Click View Image link to view waveform and result   Final       Review of Systems:  10 systems (general, cardiovascular, respiratory, eyes, ENT, endocrine, GI, , M/S, neurological) were reviewed. Most pertinent finding(s) is/are: chronic abdominal pain, migraines. The remaining systems are all unremarkable.    Mental Status Examination:  Appearance:  awake, alert, adequately groomed, appeared stated age, wears makeup, on time, with boyfriend  Attitude:  cooperative   Eye Contact:  adequate, wears glasses   Gait and Station: Normal, No assistive Devices used and No dizziness or falls  Psychomotor Behavior:  no evidence of tardive dyskinesia, dystonia, or tics  Oriented to:  time, person, and place  Attention Span and Concentration:  Normal  Speech:  clear, coherent, regular rate, regular rhythm and fluent  Mood:   \"weldon\"  Affect:  intensity is blunted  Associations:  no loose associations  Thought Process:  logical, linear and goal oriented  Thought Content:  no evidence of suicidal ideation or homicidal ideation, no evidence of psychotic thought and Appropriate to Interview  Recent and Remote Memory:  Intact. Not formally assessed. No amnesia.  Fund of Knowledge: appropriate  Insight:  fair  Judgment:  intact  Impulse Control:  intact     Suicide Risk Assessment:  Today Samara Oropeza reports many psychosocial stressors, anxiety, and low mood. In addition, there are notable risk factors for self-harm, including age, anxiety, family history and comorbid medical condition of chronic pain. However, risk is mitigated by commitment to family, absence of " past attempts, ability to volunteer a safety plan, history of seeking help when needed, future oriented, denies suicidal intent or plan and denies homicidal ideation, intent, or plan. Therefore, based on all available evidence including the factors cited above, Samara Oropeza does not appear to be at imminent risk for self-harm, does not meet criteria for a 72-hr hold, and therefore remains appropriate for ongoing outpatient level of care.  A thorough assessment of risk factors related to suicide and self-harm have been reviewed and are noted above. The patient convincingly denies suicidality on several occasions. Local community resources reviewed and printed for patient to use if needed. There was no deceit detected, and the patient presented in a manner that was believable.      DSM5  Diagnosis:  296.32 (F33.1) Major Depressive Disorder, Recurrent Episode, Moderate With anxious distress  300.02 (F41.1) Generalized Anxiety Disorder  Post-traumatic stress disorder (PTSD)     Medical comorbidities include:   Patient Active Problem List    Diagnosis Date Noted     Chronic pain syndrome 07/27/2017     Priority: Medium     Major depressive disorder, recurrent episode, moderate (H) 06/27/2017     Priority: Medium     Cervical pain 05/02/2017     Priority: Medium     Acute left ankle pain 03/31/2017     Priority: Medium     Cervical dystonia 03/28/2017     Priority: Medium     PTSD (post-traumatic stress disorder) 01/17/2017     Priority: Medium     Left knee pain 10/20/2016     Priority: Medium     Patellofemoral instability 10/20/2016     Priority: Medium     Fibromyalgia 08/04/2016     Priority: Medium     Rheumatoid arthritis of multiple sites without rheumatoid factor (H) 08/04/2016     Priority: Medium     Raynaud's disease without gangrene 08/04/2016     Priority: Medium     Chronic abdominal pain 08/04/2016     Priority: Medium     Palpitations 01/12/2016     Priority: Medium     On colchicine therapy  10/30/2015     Priority: Medium     Spell of shaking 05/06/2015     Priority: Medium     Migraine 02/04/2015     Priority: Medium     Problem list name updated by automated process. Provider to review       Behcet's disease (H) 12/10/2014     Priority: Medium     Headaches due to old head injury 11/12/2013     Priority: Medium     Milk protein intolerance 10/11/2013     Priority: Medium     Concussion 02/13/2013     Priority: Medium     Jan 2013, with prolonged recovery- followed by sports med         Knee pain 01/03/2013     Priority: Medium     TAHIR (generalized anxiety disorder) 06/25/2009     Priority: Medium     Tics - Tourette syndrome 05/18/2009     Priority: Medium     Followed by psychotherapy. Symptoms well managed. Originally diagnosed at [facility]. (Dr. Simpson)           IBS (irritable bowel syndrome) 05/18/2009     Priority: Medium     Seasonal allergic rhinitis 05/18/2009     Priority: Medium       Psychosocial & Contextual Factors:  Medical Comorbidites    Assessment:  [place] YNES Oropeza reports no changes from addition of Buspar (buspirone). Will likely need higher dosing to help. Medication side effects and alternatives were reviewed. Health promotion activities recommended and reviewed today. Patient is on prednisone again after allergic reaction to food, which can affect her moods.     Strongly recommend Controlled Substance Agreement for all controlled medications.    Continue to monitor nightmares and may augment with cyproheptadine or other sleeping medication such as Vistaril/Atarax (hydroxyzine) or Desyrel/Olepto (trazodone) or Remeron (mirtazapine). Cymbalta (duloxetine) may also be an option for anxiety, depression, and chronic pain. Patient reports she may have been on Neurontin (gabapentin).     Consider sleep consultation in future to rule out sleep apnea and other parasomnias.     Treatment Plan:    Continue Intuniv (guanfacine) 3 mg two times per day per primary care  provider.    Continue Elavil (amitriptyline) 50 mg by mouth daily at bedtime.     Continue Ativan (lorazepam) as needed for anxiety per primary care provider.     Increase Buspar (buspirone) 15 mg by mouth 2 times per day. If needed, may consider dose increase.     Start Vistaril/Atarax (hydroxyzine) 25- 50 mg by mouth up to 4 times daily for anxiety.     Continue all other medications as reviewed per electronic medical record today.     All questions addressed. Education and counseling completed regarding risks and benefits of medications and psychotherapy options.    Safety plan was reviewed. To the Emergency Department as needed or call after hours crisis line at [phone] or [phone].     Continue individual/group therapy as planned with Layla Browne.     Schedule an appointment with me in 6-8 weeks or sooner as needed. Call [facility] at [phone] to schedule.    Follow up with primary care provider as planned or for acute medical concerns.    Call the psychiatric nurse line with medication questions or concerns at [phone].    My Practice Policy was reviewed and signed: YES     MyChart may be used to communicate with your provider, but this is not intended to be used for emergencies.    Administrative Billing:   Time spent with patient and boyfriend was 30 minutes and greater than 50% of time or 20 minutes was spent in counseling and coordination of care.    Patient Status:  Patient will continue to be seen for ongoing consultation and stabilization.    Signed:   Cata Hurst, PhD, APRN, CNP   Psychiatry

## 2017-08-16 NOTE — NURSING NOTE
"Chief Complaint   Patient presents with     RECHECK     no new concerns, no change with adding buspar       Initial /60 (BP Location: Right arm, Patient Position: Chair, Cuff Size: Adult Regular)  Pulse 105  Temp 98.6  F (37  C) (Oral)  Wt 145 lb (65.8 kg)  LMP 07/31/2017  SpO2 99%  BMI 25.69 kg/m2 Estimated body mass index is 25.69 kg/(m^2) as calculated from the following:    Height as of 7/31/17: 5' 3\" (1.6 m).    Weight as of this encounter: 145 lb (65.8 kg).  Medication Reconciliation: complete    "

## 2017-08-17 ENCOUNTER — OFFICE VISIT (OUTPATIENT)
Dept: PALLIATIVE MEDICINE | Facility: CLINIC | Age: 22
End: 2017-08-17
Payer: COMMERCIAL

## 2017-08-17 DIAGNOSIS — G89.4 CHRONIC PAIN SYNDROME: Primary | ICD-10-CM

## 2017-08-17 PROCEDURE — 97110 THERAPEUTIC EXERCISES: CPT | Mod: GP | Performed by: PHYSICAL THERAPIST

## 2017-08-17 PROCEDURE — 96152 HC HEALTH AND BEHAVIOR INTERVENTION, INDIVIDUAL, EACH 15 MINUTES: CPT | Performed by: PSYCHOLOGIST

## 2017-08-17 PROCEDURE — 97112 NEUROMUSCULAR REEDUCATION: CPT | Mod: GP | Performed by: PHYSICAL THERAPIST

## 2017-08-17 ASSESSMENT — ANXIETY QUESTIONNAIRES: GAD7 TOTAL SCORE: 17

## 2017-08-17 NOTE — PROGRESS NOTES
"Patient Name: Samara Oropeza      YOB: 1995     Medical Record Number: [id number]  Diagnosis: Chronic pain syndrome  Visit Info Length of Visit: 45 min  Arrived: On Time  Therapeutic Procedures/Exercises: 15 min; Therapeutic Activities: NA; Neuromuscular Re-education: 30 min    Exercise/Activity Education Instruction:  Reviewed ANS dysregulation - how anxiety contributes and how this influences pain experience.  Encouraged her to continue to practice the relaxation techniques on a regular basis.      Activity:  Stretching:  Instructed in restorative thoracic extension stretch over towel roll.  Pt felt a good chest opener and practiced with mindful breathing.      Instructed in grounding activities: seated w/ visualization (she chose \"groot\" from [employer] the [employer]), standing mountain pose from yoga.  Practice grounding when she starts to feel anxious and thoughts are swirling / feeling overwhelmed.      Led through supine kinesthetic awareness activity (body scan) with mindful breathing.  Pt reported increased relaxation of mm tension in some parts of her body as well as more relaxed breathing.  Issued [facility] relaxation CD and offered Insight Timer bobbi resource and included search terms to use within the bobbi (body scan, relaxation breathing).     Notes: Patient reports: over the interval she has noted increased left shoulder pain, increased LBP, increased chest pain and this chest pain is occurring in the daytime hours now too (not just at night time).  Knee pain continues as well (not using AD today).    She is acknowledging increased anxiety and noticing some occurrence with the chest pain when it happens.      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  This was very very stressful and as a result she was \"off\" for five days.      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  ;   Walking program: continues with high intensity aerobic activity most days of the week ;   Heat/Ice/TENS: not addressed today.   ;   HEP has been using the pec stretch daily and it is helpful.            Demonstrates/Verbalizes Technique: 3 (1= poor technique-difficulty performing exercises,significant cues required; 5= good technique-performs exercises without cues)  Body Awareness: 3 (1=low awareness; 5=high awareness)  Posture/Stability: 3 (1= poor posture, stability; 5= good posture, stability)   Motivational Level:   Eager to learn and Cooperative Participation:  Full   Patient Satisfaction:  satisfied   Response to Teaching:   demonstrated ability and verbalized, recall/understanding  Factors that affect learning: None   Plan of Care  Cont PT to support reactivation and integration of self regulation pain management skills. (next visit consider:  Mini breaks as  sx self regulation, posture, body scan practice)   Therapist: Emily Rollins PT                 Date: 8/17/2017                                                                                               "

## 2017-08-17 NOTE — PROGRESS NOTES
"                                  [facility]   [facility] [place]  Behavioral Medicine Visit    Patient Name: Samara Oropeza    YOB: 1995   Medical Record Number: [id number]  Date: 8/17/2017                SUBJECTIVE: Met with the patient on this date for an elective return appointment. The patient reports the following: Her level of pain is the same, her mood is the same, her activity level is the same, her stress level has been the same and her sleep has been mildly worse. The patient discusses her symptoms in great detail today she admits to having little confidence that matters can change with her medical history. She admits that she routinely arrives at one conclusion only about what any given symptom or circumstance might actually mean.    Today we talked about specific strategies to employ relative to these concerns #1 always consider different possible outcomes and conclusions. #2 work on breaking stress factors into pieces; asking the question: Can I control this right now? #3 adding to writing that is focused on externalizing to consider the topic of how come I believe it is impossible to change? #4 don't avoid symptoms in entirety  Instead use cognitive strategies such as \"this is anxiety and it will pass\".    The patient's degree of social anxiety is extreme. The patient's focus on symptomology is consistent with symptoms associated with somatic disorders. This is not to suggest that she does not have a range of medical concerns but rather the degree to which her focus is exclusive to symptoms and stress.          OBJECTIVE:   Length of Visit: 75 minutes      Assessment: Current Emotional / Mental Status    Appearance:   Appropriate   Eye Contact:   Poor  Psychomotor Behavior: Retarded (Slowed)   Attitude:   Cooperative   Orientation:   All  Speech   Rate / Production: Slow    Volume:  Soft   Mood:    Anxious   Affect:    Constricted  " Flat  Subdued   Thought Content:  Clear   Thought Form:  Coherent  Logical   Insight:    Fair     ASSESSMENT:   Axis I: Chronic pain syndrome  Axis II: Dx deferred    Progress toward goals: as expected.   Patient has been compliant with exercises, no change in reports symptoms   Pain Status: unchanged    Emotional Status: unchanged   Medication / chemical use concerns: None    PLAN:   Next Appointment: Samara Oropeza will schedule a follow-up appointment in 2-3 weeks.  Assignment: Continue with journaling; focus on externalizing and grounding behavior, focus on cognitive skills related to anxiety  Objectives / interventions for next session: Review    Kimo Hudson MA LP, [facility]  Pain Specialist  [facility]

## 2017-08-17 NOTE — MR AVS SNAPSHOT
After Visit Summary   8/17/2017    Samara Oropeza    MRN: [id number]           Patient Information     Date Of Birth          1995        Visit Information        Provider Department      8/17/2017 11:00 AM [facility]        Today's Diagnoses     Chronic pain syndrome    -  1       Follow-ups after your visit        Your next 10 appointments already scheduled     Aug 17, 2017  1:45 PM CDT   Return Visit with Emily Rollins PT   [facility] ([facility])    [address]  [facility] [id number] [phone]            Aug 22, 2017 11:00 AM CDT   Return Visit with Austen Marquez MD   [facility] ([facility])    [address] [place] [id number] [phone]            Aug 25, 2017  2:50 PM CDT   (Arrive by 2:35 PM)   LUIZ Extremity with Jennifer Alvarenga PT   [facility] ([facility])    [address] [place] [id number] [phone]            Aug 30, 2017  2:50 PM CDT   LUIZ Extremity with Jennifer Alvarenga PT   [facility] ([facility])    [address] [place] [id number] [phone]            Aug 31, 2017  9:00 AM CDT   Return Visit with EVI Dahl CNP   [facility]    [address]  [facility] [address] [phone]            Aug 31, 2017 10:00 AM CDT   Return Visit with Emily Rollins PT   [facility])    [address]  [address]  [facility] [phone] [phone]            Aug 31, 2017  1:00 PM CDT   Return Visit with Kimo Hudson LP   [facility] [place] ([facility] [place])    [address] [facility]  [place]  [phone]            Sep 05, 2017 12:30 PM CDT   Return Visit with ALISA Burt   [facility] ([facility])    [address] [phone]            Sep 05, 2017  3:50 PM CDT   LUIZ Extremity with Jennifer Alvarenga PT   [facility] ([facility])    [address] [place] [address] [address] [facility] [id number] [phone]            Sep 12, 2017  1:00 PM CDT   Return Visit with Kimo Hudson LP   [facility])    [address] [facility]  [place] [phone]              Who to contact     If you have questions or need follow up information about today's clinic visit or your schedule please contact [facility] directly at [phone].  Normal or non-critical lab and imaging results will be communicated to you by [employer], letter or phone within 4 business days after the clinic has received the results. If you do not hear from us within 7 days, please contact the clinic through [employer] or phone. If you have a critical or abnormal lab result, we will notify you by phone as soon as possible.  Submit refill requests through [employer] or call your pharmacy and they will forward the refill request to us. Please allow 3 business days for your refill to be completed.          Additional Information About Your Visit        [employer] Information     [employer] lets you send messages to your doctor, view your test results, renew your prescriptions, schedule appointments and more. To sign up, go to [email or web address] . Click on \"Log in\" on the left side of the screen, which will take you to the Welcome page. Then click on \"Sign up Now\" on the right side of the page.     You will be asked to enter the access code listed below, as well as some personal information. Please follow the " directions to create your username and password.     Your access code is: [email or web address]  Expires: 10/29/2017 10:26 AM     Your access code will  in 90 days. If you need help or a new code, please call your [place] clinic or [phone].        Care EveryWhere ID     This is your Care EveryWhere ID. This could be used by other organizations to access your [place] medical records  [id number]        Your Vitals Were     Last Period                   2017            Blood Pressure from Last 3 Encounters:   17 102/60   17 123/85   17 118/83    Weight from Last 3 Encounters:   17 65.8 kg (145 lb)   17 65.8 kg (145 lb)   17 66.2 kg (146 lb)              We Performed the Following     HEALTH & BEHAVIOR ASSESS, INITIAL, EA 15MIN PHD          Today's Medication Changes          These changes are accurate as of: 17  1:40 PM.  If you have any questions, ask your nurse or doctor.               These medicines have changed or have updated prescriptions.        Dose/Directions    amitriptyline 25 MG tablet   Commonly known as:  ELAVIL   This may have changed:  how much to take   Used for:  Atypical chest pain, Insomnia, unspecified type        Dose:  25 mg   Take 1 tablet (25 mg) by mouth At Bedtime   Quantity:  45 tablet   Refills:  5                Primary Care Provider Office Phone # Fax #    Sonja EVI Laguerre [facility] [phone]       [address] [facility] [address]  [place] [id number]        Equal Access to Services     FRANK GALEANO AH: Hadii aad ku hadasho Soomaali, waaxda luqadaha, qaybta kaalmada adeegyada, waxay brad rodriguez . So [facility] [phone].    ATENCIÓN: Si habla español, tiene a livingston disposición servicios gratuitos de asistencia lingüística. Llame al [phone].    We comply with applicable federal civil rights laws and [place] laws. We do not discriminate on the basis of race, color, national origin, age, disability sex,  sexual orientation or gender identity.            Thank you!     Thank you for choosing [facility]  for your care. Our goal is always to provide you with excellent care. Hearing back from our patients is one way we can continue to improve our services. Please take a few minutes to complete the written survey that you may receive in the mail after your visit with us. Thank you!             Your Updated Medication List - Protect others around you: Learn how to safely use, store and throw away your medicines at www.disposemymeds.org.          This list is accurate as of: 8/17/17  1:40 PM.  Always use your most recent med list.                   Brand Name Dispense Instructions for use Diagnosis    albuterol 108 (90 BASE) MCG/ACT Inhaler    PROAIR HFA/PROVENTIL HFA/VENTOLIN HFA    1 Inhaler    Inhale 2 puffs into the lungs every 6 hours as needed for shortness of breath / dyspnea or wheezing    Atypical chest pain       amitriptyline 25 MG tablet    ELAVIL    45 tablet    Take 1 tablet (25 mg) by mouth At Bedtime    Atypical chest pain, Insomnia, unspecified type       apremilast 30 MG tablet    OTEZLA    60 tablet    20 mg in AM and 30mg in PM daily X 2 weeks and then 30mg twice daily.    Behcet's disease (H)       ASPIRIN CHILDRENS 81 MG chewable tablet   Generic drug:  aspirin      Take 81 mg by mouth as needed        betamethasone valerate 0.1 % cream    VALISONE     Apply topically 2 times daily        * botulinum toxin type A 100 UNITS injection    BOTOX    200 Units    Inject 200 Units into the muscle every 3 months    Cervical dystonia       * BOTOX IJ      Inject 150 Units into the muscle once Lot: /C3 Exp: 05/2019        * BOTOX IJ      Inject 150 Units into the muscle Lot # /C3  Exp: 8/2019        * BOTOX IJ      Inject 162.5 Units as directed once Lot #: /C3 Exp: 10/2019        * ONABOTULINUMTOXINA IJ      Inject 165 Units as directed once Lot # /C3 Expiration Date: 01/2020         BusPIRone HCl 30 MG Tabs     60 tablet    Take 15 mg by mouth 2 times daily Increased dose. For anxiety.    TAHIR (generalized anxiety disorder)       carbamide peroxide 6.5 % otic solution    DEBROX     5 drops 2 times daily        clobetasol 0.05 % cream    TEMOVATE    60 g    Apply topically 2 times daily    Folliculitis       Colchicine 0.6 MG Caps     90 capsule    Take 0.6 mg by mouth See Admin Instructions 2 capsules in AM and 1 capsule in PM    Behcet's syndrome (H)       diclofenac 1 % Gel topical gel    VOLTAREN    100 g    Apply 2-4 grams to affected area(s) up to 4 times per day as needed. This is an anti-inflammatory medication.    Polyarthralgia       dicyclomine 20 MG tablet    BENTYL    60 tablet    Take 1 tablet (20 mg) by mouth 4 times daily as needed    Abdominal cramping       diphenhydrAMINE 25 MG tablet    BENADRYL    30 tablet    Take 1 tablet (25 mg) by mouth every 8 hours as needed for allergies        EPINEPHrine 0.3 MG/0.3ML injection 2-pack    EPIPEN 2-EAMON    0.6 mL    Inject 0.3 mLs (0.3 mg) into the muscle as needed for anaphylaxis    Hx of bee sting allergy       EXCEDRIN MIGRAINE PO      Take by mouth as needed        guanFACINE 1 MG tablet    TENEX    180 tablet    Take 3 tablets (3 mg) by mouth twice daily in the morning and evening daily.    Tic       hydrOXYzine 25 MG tablet    ATARAX    60 tablet    Take 1-2 tablets (25-50 mg) by mouth every 6 hours as needed for anxiety    TAHIR (generalized anxiety disorder)       hyoscyamine 0.125 MG tablet    ANASPAZ/LEVSIN    40 tablet    Take 1-2 tablets (125-250 mcg) by mouth every 4 hours as needed for cramping    Abdominal pain, generalized       hypromellose 0.3 % Soln ophthalmic solution    GENTEAL     1 drop every hour as needed        LACTAID PO      Take by mouth daily        lactulose 20 GM/30ML Soln     300 mL    Take 30 mLs by mouth 3 times daily as needed        lidocaine 2 % topical gel    XYLOCAINE     Apply 1 Tube  topically daily Reported on 4/21/2017        lidocaine visc 2% 2.5mL/5mL & maalox/mylanta w/ simeth 2.5mL/5mL & diphenhydrAMINE 5mg/5mL Susp suspension    [facility] Mouthwash [facility]    1 Bottle    Swish and swallow 10 mLs in mouth every 6 hours as needed for mouth sores    Behcet's syndrome (H)       linaclotide 290 MCG capsule    LINZESS    90 capsule    Take 1 capsule (290 mcg) by mouth every morning (before breakfast)    Irritable bowel syndrome       LORazepam 1 MG tablet    ATIVAN    90 tablet    Take 0.5 tablets (0.5 mg) by mouth 2 times daily as needed for other (abdominal pain) Do not operate a vehicle after taking this medication    Chronic abdominal pain       meclizine 25 MG tablet    ANTIVERT    30 tablet    Take 1 tablet (25 mg) by mouth every 6 hours as needed for dizziness    Nausea       metaxalone 800 MG tablet    SKELAXIN    30 tablet    Take 0.5 tablets (400 mg) by mouth 3 times daily as needed for moderate pain    Sprain of neck, initial encounter, Cervical dystonia       norgestimate-ethinyl estradiol 0.25-35 MG-MCG per tablet    ORTHO-CYCLEN, SPRINTEC    84 tablet    Take 1 tablet by mouth daily    General counseling for prescription of oral contraceptives       omeprazole 40 MG capsule    priLOSEC    90 capsule    Take 1 capsule (40 mg) by mouth daily    Gastroesophageal reflux disease, esophagitis presence not specified       ondansetron 8 MG ODT tab    ZOFRAN-ODT    60 tablet    Take 1 tablet (8 mg) by mouth every 8 hours as needed for nausea    Non-intractable vomiting with nausea, unspecified vomiting type, Hematemesis, presence of nausea not specified       promethazine 25 MG tablet    PHENERGAN    20 tablet    Take 0.5-1 tablets (12.5-25 mg) by mouth every 6 hours as needed for nausea    Intractable chronic migraine without aura and without status migrainosus       sucralfate 1 GM/10ML suspension    CARAFATE    1200 mL    Take 10 mLs (1 g) by mouth 4 times daily    Bile reflux gastritis,  Nausea       triamcinolone 0.1 % cream    KENALOG    15 g    Apply sparingly to oral ulcers three times daily for 14 days as needed.    Behcet's syndrome (H)       * Notice:  This list has 5 medication(s) that are the same as other medications prescribed for you. Read the directions carefully, and ask your doctor or other care provider to review them with you.

## 2017-08-17 NOTE — MR AVS SNAPSHOT
After Visit Summary   8/17/2017    Samara Oropeza    MRN: [id number]           Patient Information     Date Of Birth          1995        Visit Information        Provider Department      8/17/2017 1:45 PM Emily Rollins, PT [facility]        Today's Diagnoses     Chronic pain syndrome    -  1       Follow-ups after your visit        Your next 10 appointments already scheduled     Aug 22, 2017 11:00 AM CDT   [facility]   [facility]    [address] [place] [id number]   [phone]            Aug 25, 2017  2:50 PM CDT   (Arrive by 2:35 PM)   LUIZ Extremity with Jennifer Alvarenga PT   [facility] ([facility])    [address] [place] [id number] [phone]            Aug 30, 2017  2:50 PM CDT   LUIZ Extremity with Jennifer Alvarenga PT   [facility] ([facility])    [address] [place] [id number] [phone]            Aug 31, 2017  9:00 AM CDT   Return Visit with EVI Dahl CNP   [facility] ([facility])    [address]  [address]  [facility] [id number] [phone]            Aug 31, 2017 10:00 AM CDT   Return Visit with Emily Rollins, PT   [facility] ([facility])    [address]  [address] [place] [id number] [phone]            Aug 31, 2017  1:00 PM CDT   Return Visit with Kimo Hudson LP   [facility] [place] ([facility] [place])    [address] [facility]  [place] [phone]            Sep 05, 2017 12:30 PM CDT   Return Visit with ALISA Burt   [facility] ([facility])    [address] [place] [address]  "[facility] [id number] [phone]            Sep 05, 2017  3:50 PM CDT   LUIZ Extremity with Jennifer Alvarenga PT   [facility] ([facility])    [address] [address] [facility] 5th Floor  [facility] [address] [phone]            Sep 12, 2017  1:00 PM CDT   Return Visit with Kimo Hudson LP   [facility] ([facility])    [address] [place] [address] [phone]            Sep 13, 2017  3:00 PM CDT   (Arrive by 2:45 PM)   Return Botox with Frederick Bender MD   [facility] ([facility])    [address]  3rd Floor  [facility] [address] [phone]              Who to contact     If you have questions or need follow up information about today's clinic visit or your schedule please contact [facility] directly at [phone].  Normal or non-critical lab and imaging results will be communicated to you by [facility], letter or phone within 4 business days after the clinic has received the results. If you do not hear from us within 7 days, please contact the clinic through [facility] or phone. If you have a critical or abnormal lab result, we will notify you by phone as soon as possible.  Submit refill requests through [facility] or call your pharmacy and they will forward the refill request to us. Please allow 3 business days for your refill to be completed.          Additional Information About Your Visit        [facility] Information     [facility] lets you send messages to your doctor, view your test results, renew your prescriptions, schedule appointments and more. To sign up, go to [email or web address] . Click on \"Log in\" on the left side of the screen, which will take you to the Welcome page. Then click on \"Sign up Now\" on the right side of the page.     You will be asked to enter the access code listed " below, as well as some personal information. Please follow the directions to create your username and password.     Your access code is: [email or web address]  Expires: 10/29/2017 10:26 AM     Your access code will  in 90 days. If you need help or a new code, please call your [facility] clinic or [phone].        Care EveryWhere ID     This is your Care EveryWhere ID. This could be used by other organizations to access your [facility] medical records  [id number]        Your Vitals Were     Last Period                   2017            Blood Pressure from Last 3 Encounters:   17 102/60   17 123/85   17 118/83    Weight from Last 3 Encounters:   17 65.8 kg (145 lb)   17 65.8 kg (145 lb)   17 66.2 kg (146 lb)              We Performed the Following     NEUROMUSCULAR RE-EDUCATION     THERAPEUTIC EXERCISES          Today's Medication Changes          These changes are accurate as of: 17  7:02 PM.  If you have any questions, ask your nurse or doctor.               These medicines have changed or have updated prescriptions.        Dose/Directions    amitriptyline 25 MG tablet   Commonly known as:  ELAVIL   This may have changed:  how much to take   Used for:  Atypical chest pain, Insomnia, unspecified type        Dose:  25 mg   Take 1 tablet (25 mg) by mouth At Bedtime   Quantity:  45 tablet   Refills:  5                Primary Care Provider Office Phone # Fax #    Sonja Annabella Abreu, APRN [facility] [phone]       [address] [facility] [address]  [facility] [address]        Equal Access to Services     [facility] : Hadii malcolm byerso Sotiffany, waaxda luqadaha, qaybta kaalmada yessica, waxjarred brad rodriguez . So [facility] [phone].    ATENCIÓN: Si habla español, tiene a livingston disposición servicios gratuitos de asistencia lingüística. Llame al [phone].    We comply with applicable federal civil rights laws and [place] laws. We do not discriminate on  the basis of race, color, national origin, age, disability sex, sexual orientation or gender identity.            Thank you!     Thank you for choosing [facility]  for your care. Our goal is always to provide you with excellent care. Hearing back from our patients is one way we can continue to improve our services. Please take a few minutes to complete the written survey that you may receive in the mail after your visit with us. Thank you!             Your Updated Medication List - Protect others around you: Learn how to safely use, store and throw away your medicines at www.disposemymeds.org.          This list is accurate as of: 8/17/17  7:02 PM.  Always use your most recent med list.                   Brand Name Dispense Instructions for use Diagnosis    albuterol 108 (90 BASE) MCG/ACT Inhaler    PROAIR HFA/PROVENTIL HFA/VENTOLIN HFA    1 Inhaler    Inhale 2 puffs into the lungs every 6 hours as needed for shortness of breath / dyspnea or wheezing    Atypical chest pain       amitriptyline 25 MG tablet    ELAVIL    45 tablet    Take 1 tablet (25 mg) by mouth At Bedtime    Atypical chest pain, Insomnia, unspecified type       apremilast 30 MG tablet    OTEZLA    60 tablet    20 mg in AM and 30mg in PM daily X 2 weeks and then 30mg twice daily.    Behcet's disease (H)       ASPIRIN CHILDRENS 81 MG chewable tablet   Generic drug:  aspirin      Take 81 mg by mouth as needed        betamethasone valerate 0.1 % cream    VALISONE     Apply topically 2 times daily        * botulinum toxin type A 100 UNITS injection    BOTOX    200 Units    Inject 200 Units into the muscle every 3 months    Cervical dystonia       * BOTOX IJ      Inject 150 Units into the muscle once Lot: /C3 Exp: 05/2019        * BOTOX IJ      Inject 150 Units into the muscle Lot # /C3  Exp: 8/2019        * BOTOX IJ      Inject 162.5 Units as directed once Lot #: /C3 Exp: 10/2019        * ONABOTULINUMTOXINA IJ       Inject 165 Units as directed once Lot # /C3 Expiration Date: 01/2020        BusPIRone HCl 30 MG Tabs     60 tablet    Take 15 mg by mouth 2 times daily Increased dose. For anxiety.    TAHIR (generalized anxiety disorder)       carbamide peroxide 6.5 % otic solution    DEBROX     5 drops 2 times daily        clobetasol 0.05 % cream    TEMOVATE    60 g    Apply topically 2 times daily    Folliculitis       Colchicine 0.6 MG Caps     90 capsule    Take 0.6 mg by mouth See Admin Instructions 2 capsules in AM and 1 capsule in PM    Behcet's syndrome (H)       diclofenac 1 % Gel topical gel    VOLTAREN    100 g    Apply 2-4 grams to affected area(s) up to 4 times per day as needed. This is an anti-inflammatory medication.    Polyarthralgia       dicyclomine 20 MG tablet    BENTYL    60 tablet    Take 1 tablet (20 mg) by mouth 4 times daily as needed    Abdominal cramping       diphenhydrAMINE 25 MG tablet    BENADRYL    30 tablet    Take 1 tablet (25 mg) by mouth every 8 hours as needed for allergies        EPINEPHrine 0.3 MG/0.3ML injection 2-pack    EPIPEN 2-EAMON    0.6 mL    Inject 0.3 mLs (0.3 mg) into the muscle as needed for anaphylaxis    Hx of bee sting allergy       EXCEDRIN MIGRAINE PO      Take by mouth as needed        guanFACINE 1 MG tablet    TENEX    180 tablet    Take 3 tablets (3 mg) by mouth twice daily in the morning and evening daily.    Tic       hydrOXYzine 25 MG tablet    ATARAX    60 tablet    Take 1-2 tablets (25-50 mg) by mouth every 6 hours as needed for anxiety    TAHIR (generalized anxiety disorder)       hyoscyamine 0.125 MG tablet    ANASPAZ/LEVSIN    40 tablet    Take 1-2 tablets (125-250 mcg) by mouth every 4 hours as needed for cramping    Abdominal pain, generalized       hypromellose 0.3 % Soln ophthalmic solution    GENTEAL     1 drop every hour as needed        LACTAID PO      Take by mouth daily        lactulose 20 GM/30ML Soln     300 mL    Take 30 mLs by mouth 3 times daily as  needed        lidocaine 2 % topical gel    XYLOCAINE     Apply 1 Tube topically daily Reported on 4/21/2017        lidocaine visc 2% 2.5mL/5mL & maalox/mylanta w/ simeth 2.5mL/5mL & diphenhydrAMINE 5mg/5mL Susp suspension    [facility] Mouthwash [facility]    1 Bottle    Swish and swallow 10 mLs in mouth every 6 hours as needed for mouth sores    Behcet's syndrome (H)       linaclotide 290 MCG capsule    LINZESS    90 capsule    Take 1 capsule (290 mcg) by mouth every morning (before breakfast)    Irritable bowel syndrome       LORazepam 1 MG tablet    ATIVAN    90 tablet    Take 0.5 tablets (0.5 mg) by mouth 2 times daily as needed for other (abdominal pain) Do not operate a vehicle after taking this medication    Chronic abdominal pain       meclizine 25 MG tablet    ANTIVERT    30 tablet    Take 1 tablet (25 mg) by mouth every 6 hours as needed for dizziness    Nausea       metaxalone 800 MG tablet    SKELAXIN    30 tablet    Take 0.5 tablets (400 mg) by mouth 3 times daily as needed for moderate pain    Sprain of neck, initial encounter, Cervical dystonia       norgestimate-ethinyl estradiol 0.25-35 MG-MCG per tablet    ORTHO-CYCLEN, SPRINTEC    84 tablet    Take 1 tablet by mouth daily    General counseling for prescription of oral contraceptives       omeprazole 40 MG capsule    priLOSEC    90 capsule    Take 1 capsule (40 mg) by mouth daily    Gastroesophageal reflux disease, esophagitis presence not specified       ondansetron 8 MG ODT tab    ZOFRAN-ODT    60 tablet    Take 1 tablet (8 mg) by mouth every 8 hours as needed for nausea    Non-intractable vomiting with nausea, unspecified vomiting type, Hematemesis, presence of nausea not specified       promethazine 25 MG tablet    PHENERGAN    20 tablet    Take 0.5-1 tablets (12.5-25 mg) by mouth every 6 hours as needed for nausea    Intractable chronic migraine without aura and without status migrainosus       sucralfate 1 GM/10ML suspension    CARAFATE    1200 mL     Take 10 mLs (1 g) by mouth 4 times daily    Bile reflux gastritis, Nausea       triamcinolone 0.1 % cream    KENALOG    15 g    Apply sparingly to oral ulcers three times daily for 14 days as needed.    Behcet's syndrome (H)       * Notice:  This list has 5 medication(s) that are the same as other medications prescribed for you. Read the directions carefully, and ask your doctor or other care provider to review them with you.

## 2017-08-20 ENCOUNTER — RADIANT APPOINTMENT (OUTPATIENT)
Dept: GENERAL RADIOLOGY | Facility: CLINIC | Age: 22
End: 2017-08-20
Attending: FAMILY MEDICINE
Payer: COMMERCIAL

## 2017-08-20 ENCOUNTER — OFFICE VISIT (OUTPATIENT)
Dept: URGENT CARE | Facility: URGENT CARE | Age: 22
End: 2017-08-20
Payer: COMMERCIAL

## 2017-08-20 VITALS
TEMPERATURE: 96.7 F | WEIGHT: 145 LBS | SYSTOLIC BLOOD PRESSURE: 103 MMHG | BODY MASS INDEX: 25.69 KG/M2 | DIASTOLIC BLOOD PRESSURE: 66 MMHG | HEART RATE: 85 BPM | OXYGEN SATURATION: 100 %

## 2017-08-20 DIAGNOSIS — S99.929A FOOT INJURY: ICD-10-CM

## 2017-08-20 DIAGNOSIS — S90.31XA CONTUSION OF RIGHT FOOT, INITIAL ENCOUNTER: Primary | ICD-10-CM

## 2017-08-20 PROCEDURE — 99213 OFFICE O/P EST LOW 20 MIN: CPT | Performed by: FAMILY MEDICINE

## 2017-08-20 PROCEDURE — 73630 X-RAY EXAM OF FOOT: CPT | Mod: RT

## 2017-08-20 NOTE — MR AVS SNAPSHOT
After Visit Summary   8/20/2017    Samara Oropeza    MRN: [id number]           Patient Information     Date Of Birth          1995        Visit Information        Provider Department      8/20/2017 11:40 AM Emily Francisco MD [facility]        Today's Diagnoses     Foot injury    -  1    Contusion of right foot, initial encounter          Care Instructions      Foot Contusion  You have a contusion. This is also called a bruise. There is swelling and some bleeding under the skin, but no broken bones. This injury generally takes a few days to a few weeks to heal.  During that time, the bruise will typically change in color from reddish, to purple-blue, to greenish-yellow, then to yellow-brown.  Home care    Elevate the foot to reduce pain and swelling. As much as possible, sit or lie down with the foot raised about the level of your heart. This is especially important during the first 48 hours.    Ice the foot to help reduce pain and swelling. Wrap a cold source (ice pack or ice cubes in a plastic bag) in a thin towel. Apply to the bruised area for 20 minutes every 1 to 2 hours the first day. Continue this 3 to 4 times a day until the pain and swelling goes away.    Unless another medication was prescribed, you can take acetaminophen, ibuprofen, or naproxen to control pain. (If you have chronic liver or kidney disease or ever had a stomach ulcer or GI bleeding, talk with your doctor before using these medicines.)  Follow up  Follow up with your health care provider or our staff as advised. Call if you are not improving within 1 to 2 weeks.  When to seek medical advice   Call your health care provider right away if you have any of the following:    Increased pain or swelling    Foot or leg becomes cold, blue, numb or tingly    Signs of infection: Warmth, drainage, or increased redness or pain around the bruise    Inability to move the injured foot     Frequent  bruising for unknown reasons  Date Last Reviewed: 4/29/2015 2000-2017 [employer]. [address] [place] [address], [place] [address]. All rights reserved. This information is not intended as a substitute for professional medical care. Always follow your healthcare professional's instructions.                Follow-ups after your visit        Your next 10 appointments already scheduled     Aug 22, 2017 11:00 AM CDT   Return Visit with Austen Marquez MD   [facility][1] ([facility][1])    [address] [place] [address] [id number] [phone]            Aug 25, 2017  2:50 PM CDT   (Arrive by 2:35 PM)   LUIZ Extremity with Jennifer Alvarenga, PT   [facility] ([facility])    [address] [place] [address] [id number] [phone]            Aug 30, 2017  2:50 PM CDT   LUIZ Extremity with Jennifer Alvarenga, PT   [facility] ([facility])    [address] [place] [address] [id number] [phone]            Aug 31, 2017  9:00 AM CDT   Return Visit with EVI Dahl CNP   [facility] ([facility])    [address]  [place] [address] [place] [address] [id number] [phone]            Aug 31, 2017 10:00 AM CDT   Return Visit with Emily Rollins PT   [facility] ([facility])    [address]  [place] [address] [place] [address] [phone]            Aug 31, 2017  1:00 PM CDT   Return Visit with Kimo Hudson LP   [facility] [place] ([facility] [place])    [address] [facility] [address] [phone]            Sep 05, 2017 12:30 PM CDT   Return Visit with ALISA Burt   [facility] ([facility])    [address] [place] [address]  "[phone]   [phone]            Sep 05, 2017  3:50 PM CDT   LUIZ Extremity with Jennifer Alvarenga PT   [facility] ([facility] [place])    [address] [facility] 5th Floor  [facility] [id number] [phone]            Sep 12, 2017  1:00 PM CDT   Return Visit with Kimo Hudson LP   [facility] ([facility])    [address] [facility] [address]  [place] [address] [phone]            Sep 13, 2017  3:00 PM CDT   (Arrive by 2:45 PM)   Return Botox with Frederick Bender MD   [facility] ([facility])    [address]  3rd Floor  [facility] [address] [phone]              Who to contact     If you have questions or need follow up information about today's clinic visit or your schedule please contact [facility] directly at [phone].  Normal or non-critical lab and imaging results will be communicated to you by Moog[employer]art, letter or phone within 4 business days after the clinic has received the results. If you do not hear from us within 7 days, please contact the clinic through Moogihart or phone. If you have a critical or abnormal lab result, we will notify you by phone as soon as possible.  Submit refill requests through [employer] or call your pharmacy and they will forward the refill request to us. Please allow 3 business days for your refill to be completed.          Additional Information About Your Visit        AdWhirl Information     [employer] lets you send messages to your doctor, view your test results, renew your prescriptions, schedule appointments and more. To sign up, go to [email or web address] . Click on \"Log in\" on the left side of the screen, which will take you to the Welcome page. Then click on \"Sign up Now\" on the right side of the page.     You will be asked to enter the access code listed below, as well as some " personal information. Please follow the directions to create your username and password.     Your access code is: [email or web address]  Expires: 10/29/2017 10:26 AM     Your access code will  in 90 days. If you need help or a new code, please call your [facility] or [phone].        Care EveryWhere ID     This is your Care EveryWhere ID. This could be used by other organizations to access your [facility] medical records  [id number]        Your Vitals Were     Pulse Temperature Last Period Pulse Oximetry BMI (Body Mass Index)       85 96.7  F (35.9  C) (Tympanic) 2017 100% 25.69 kg/m2        Blood Pressure from Last 3 Encounters:   17 103/66   17 102/60   17 123/85    Weight from Last 3 Encounters:   17 145 lb (65.8 kg)   17 145 lb (65.8 kg)   17 145 lb (65.8 kg)                 Today's Medication Changes          These changes are accurate as of: 17 12:18 PM.  If you have any questions, ask your nurse or doctor.               Start taking these medicines.        Dose/Directions    order for DME   Used for:  Contusion of right foot, initial encounter   Started by:  Emily Francisco MD        Post op shoe   Quantity:  1 Device   Refills:  0         These medicines have changed or have updated prescriptions.        Dose/Directions    amitriptyline 25 MG tablet   Commonly known as:  ELAVIL   This may have changed:  how much to take   Used for:  Atypical chest pain, Insomnia, unspecified type        Dose:  25 mg   Take 1 tablet (25 mg) by mouth At Bedtime   Quantity:  45 tablet   Refills:  5            Where to get your medicines      Some of these will need a paper prescription and others can be bought over the counter.  Ask your nurse if you have questions.     Bring a paper prescription for each of these medications     order for DME                Primary Care Provider Office Phone # Fax #    EVI Cardona [facility] [phone]       [address] [address]        Equal Access to Services     DELISAFRANK FROYLAN : Hadii malcolm ku hadbrooks Sotiffany, waaxda luqadaha, qaybta karaffyda timshonamariya, waxjarred verónicain hayaaemmy dumontdavid hernandezchante parsons. So [facility] [phone].    ATENCIÓN: Si habla español, tiene a livingston disposición servicios gratuitos de asistencia lingüística. Llame al [phone].    We comply with applicable federal civil rights laws and [place] laws. We do not discriminate on the basis of race, color, national origin, age, disability sex, sexual orientation or gender identity.            Thank you!     Thank you for choosing [facility]  for your care. Our goal is always to provide you with excellent care. Hearing back from our patients is one way we can continue to improve our services. Please take a few minutes to complete the written survey that you may receive in the mail after your visit with us. Thank you!             Your Updated Medication List - Protect others around you: Learn how to safely use, store and throw away your medicines at www.disposemymeds.org.          This list is accurate as of: 8/20/17 12:18 PM.  Always use your most recent med list.                   Brand Name Dispense Instructions for use Diagnosis    albuterol 108 (90 BASE) MCG/ACT Inhaler    PROAIR HFA/PROVENTIL HFA/VENTOLIN HFA    1 Inhaler    Inhale 2 puffs into the lungs every 6 hours as needed for shortness of breath / dyspnea or wheezing    Atypical chest pain       amitriptyline 25 MG tablet    ELAVIL    45 tablet    Take 1 tablet (25 mg) by mouth At Bedtime    Atypical chest pain, Insomnia, unspecified type       apremilast 30 MG tablet    OTEZLA    60 tablet    20 mg in AM and 30mg in PM daily X 2 weeks and then 30mg twice daily.    Behcet's disease (H)       ASPIRIN CHILDRENS 81 MG chewable tablet   Generic drug:  aspirin      Take 81 mg by mouth as needed        betamethasone valerate 0.1 % cream    VALISONE     Apply topically 2  times daily        * botulinum toxin type A 100 UNITS injection    BOTOX    200 Units    Inject 200 Units into the muscle every 3 months    Cervical dystonia       * BOTOX IJ      Inject 150 Units into the muscle once Lot: /C3 Exp: 05/2019        * BOTOX IJ      Inject 150 Units into the muscle Lot # /C3  Exp: 8/2019        * BOTOX IJ      Inject 162.5 Units as directed once Lot #: /C3 Exp: 10/2019        * ONABOTULINUMTOXINA IJ      Inject 165 Units as directed once Lot # /C3 Expiration Date: 01/2020        BusPIRone HCl 30 MG Tabs     60 tablet    Take 15 mg by mouth 2 times daily Increased dose. For anxiety.    TAHIR (generalized anxiety disorder)       carbamide peroxide 6.5 % otic solution    DEBROX     5 drops 2 times daily        clobetasol 0.05 % cream    TEMOVATE    60 g    Apply topically 2 times daily    Folliculitis       Colchicine 0.6 MG Caps     90 capsule    Take 0.6 mg by mouth See Admin Instructions 2 capsules in AM and 1 capsule in PM    Behcet's syndrome (H)       diclofenac 1 % Gel topical gel    VOLTAREN    100 g    Apply 2-4 grams to affected area(s) up to 4 times per day as needed. This is an anti-inflammatory medication.    Polyarthralgia       dicyclomine 20 MG tablet    BENTYL    60 tablet    Take 1 tablet (20 mg) by mouth 4 times daily as needed    Abdominal cramping       diphenhydrAMINE 25 MG tablet    BENADRYL    30 tablet    Take 1 tablet (25 mg) by mouth every 8 hours as needed for allergies        EPINEPHrine 0.3 MG/0.3ML injection 2-pack    EPIPEN 2-EAMON    0.6 mL    Inject 0.3 mLs (0.3 mg) into the muscle as needed for anaphylaxis    Hx of bee sting allergy       EXCEDRIN MIGRAINE PO      Take by mouth as needed        guanFACINE 1 MG tablet    TENEX    180 tablet    Take 3 tablets (3 mg) by mouth twice daily in the morning and evening daily.    Tic       hydrOXYzine 25 MG tablet    ATARAX    60 tablet    Take 1-2 tablets (25-50 mg) by mouth every 6 hours as  needed for anxiety    TAHIR (generalized anxiety disorder)       hyoscyamine 0.125 MG tablet    ANASPAZ/LEVSIN    40 tablet    Take 1-2 tablets (125-250 mcg) by mouth every 4 hours as needed for cramping    Abdominal pain, generalized       hypromellose 0.3 % Soln ophthalmic solution    GENTEAL     1 drop every hour as needed        LACTAID PO      Take by mouth daily        lactulose 20 GM/30ML Soln     300 mL    Take 30 mLs by mouth 3 times daily as needed        lidocaine 2 % topical gel    XYLOCAINE     Apply 1 Tube topically daily Reported on 4/21/2017        lidocaine visc 2% 2.5mL/5mL & maalox/mylanta w/ simeth 2.5mL/5mL & diphenhydrAMINE 5mg/5mL Susp suspension    [facility]    1 Bottle    Swish and swallow 10 mLs in mouth every 6 hours as needed for mouth sores    Behcet's syndrome (H)       linaclotide 290 MCG capsule    LINZESS    90 capsule    Take 1 capsule (290 mcg) by mouth every morning (before breakfast)    Irritable bowel syndrome       LORazepam 1 MG tablet    ATIVAN    90 tablet    Take 0.5 tablets (0.5 mg) by mouth 2 times daily as needed for other (abdominal pain) Do not operate a vehicle after taking this medication    Chronic abdominal pain       meclizine 25 MG tablet    ANTIVERT    30 tablet    Take 1 tablet (25 mg) by mouth every 6 hours as needed for dizziness    Nausea       metaxalone 800 MG tablet    SKELAXIN    30 tablet    Take 0.5 tablets (400 mg) by mouth 3 times daily as needed for moderate pain    Sprain of neck, initial encounter, Cervical dystonia       norgestimate-ethinyl estradiol 0.25-35 MG-MCG per tablet    ORTHO-CYCLEN, SPRINTEC    84 tablet    Take 1 tablet by mouth daily    General counseling for prescription of oral contraceptives       omeprazole 40 MG capsule    priLOSEC    90 capsule    Take 1 capsule (40 mg) by mouth daily    Gastroesophageal reflux disease, esophagitis presence not specified       ondansetron 8 MG ODT tab    ZOFRAN-ODT    60 tablet     Take 1 tablet (8 mg) by mouth every 8 hours as needed for nausea    Non-intractable vomiting with nausea, unspecified vomiting type, Hematemesis, presence of nausea not specified       order for DME     1 Device    Post op shoe    Contusion of right foot, initial encounter       promethazine 25 MG tablet    PHENERGAN    20 tablet    Take 0.5-1 tablets (12.5-25 mg) by mouth every 6 hours as needed for nausea    Intractable chronic migraine without aura and without status migrainosus       sucralfate 1 GM/10ML suspension    CARAFATE    1200 mL    Take 10 mLs (1 g) by mouth 4 times daily    Bile reflux gastritis, Nausea       triamcinolone 0.1 % cream    KENALOG    15 g    Apply sparingly to oral ulcers three times daily for 14 days as needed.    Behcet's syndrome (H)       * Notice:  This list has 5 medication(s) that are the same as other medications prescribed for you. Read the directions carefully, and ask your doctor or other care provider to review them with you.

## 2017-08-20 NOTE — PATIENT INSTRUCTIONS
Foot Contusion  You have a contusion. This is also called a bruise. There is swelling and some bleeding under the skin, but no broken bones. This injury generally takes a few days to a few weeks to heal.  During that time, the bruise will typically change in color from reddish, to purple-blue, to greenish-yellow, then to yellow-brown.  Home care    Elevate the foot to reduce pain and swelling. As much as possible, sit or lie down with the foot raised about the level of your heart. This is especially important during the first 48 hours.    Ice the foot to help reduce pain and swelling. Wrap a cold source (ice pack or ice cubes in a plastic bag) in a thin towel. Apply to the bruised area for 20 minutes every 1 to 2 hours the first day. Continue this 3 to 4 times a day until the pain and swelling goes away.    Unless another medication was prescribed, you can take acetaminophen, ibuprofen, or naproxen to control pain. (If you have chronic liver or kidney disease or ever had a stomach ulcer or GI bleeding, talk with your doctor before using these medicines.)  Follow up  Follow up with your health care provider or our staff as advised. Call if you are not improving within 1 to 2 weeks.  When to seek medical advice   Call your health care provider right away if you have any of the following:    Increased pain or swelling    Foot or leg becomes cold, blue, numb or tingly    Signs of infection: Warmth, drainage, or increased redness or pain around the bruise    Inability to move the injured foot     Frequent bruising for unknown reasons  Date Last Reviewed: 4/29/2015 2000-2017 [employer]. [address] [place] [address], [place] [address]. All rights reserved. This information is not intended as a substitute for professional medical care. Always follow your healthcare professional's instructions.

## 2017-08-20 NOTE — NURSING NOTE
"Chief Complaint   Patient presents with     Urgent Care     Pt in clinic to have eval for right foot pain due to injury.       Initial /66  Pulse 85  Temp 96.7  F (35.9  C) (Tympanic)  Wt 145 lb (65.8 kg)  LMP 07/31/2017  SpO2 100%  BMI 25.69 kg/m2 Estimated body mass index is 25.69 kg/(m^2) as calculated from the following:    Height as of 7/31/17: 5' 3\" (1.6 m).    Weight as of this encounter: 145 lb (65.8 kg).  Medication Reconciliation: complete   Millicent Webb/ MA    "
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
## 2017-08-21 NOTE — PROGRESS NOTES
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 Lilliana Miramontes- complicated migraine  # On Sprintec for birth control   # Borderline transaminase elevation    4/17 Basic blood cell counts, liver and kidney function labs within normal limits except borderline ALT elevation. CRP within normal limits    Meets ISG 1990 criteria for Behcets. Initially, very good response to colchicine which has reduced the intensity and frequency of the oral ulcers in the past. Patient relapsed with genital ulcers and few oral ulcers in the end of 2016 and also with folliculitis in skin. Seen Derm Dr. Elliott. He recommended otezla. Otezla is approved for her now. Slow tapering up because of GI intolerance. Chest pain , mouth sores, hand pain, morning pain were all better when she tried otezla 30mg twice daily. But she cut back to once daily because of GI upset. Currently on 30mg in PM. The severity and frequency of oral and genital ulcers have improved on otezla already. When she skipped couple of days of otezla, her symptoms were worse. Increase otezla to 30mg PO BID as tolerated. Continue colchicine 1.2mg in AM and 0.6mg in PM daily.    Has tried prednisone in the past, but does not tolerate well due to mood issues, and has been off prednisone for the past 6+ months. Has H/o Tourettes.  Followed by Dr. Miramontes.     She continues to have GI symptoms  Colonoscopy was negative in the past.  Has gastroparesis.  Behcets may have GI involvement but no evidence of GI inflammation at this time. Dr. Baker has evaluated her.   She gets visual symptoms  But there is no evidence of uveitis including posterior uveitis or retinal vasculitis, denies symptoms at today's visit. She has seen Dr. Garcia in the past and also seen Dr. Heaton around 2/16. Patient still getting double vision. Floaters present. I had referred to opthalmology again for reassessment.      Left ankle sprain followed by podiatry - resolved. But recently had right foot injury and tried a wrap. She has developed contact dermatitis following that in the right leg and foot. I have recommended benadryl OTC and also topical hydrocortisone cream OTC. Her mother is concerned about DVT. I do not see any unilateral leg edema to suspect this.     Mother is concerned about HLA B51 testing. We discussed that this may not change the management at this point. Because the patient has chronic low back pain and morning stiffness, we will check HLA B27 to assess for spondyloarthropathy.     She also likely has fibromyalgia which is uncontrolled. Since patient has medication intolerance, we will address fibromyalgia after optimizing her behcets treatment.     For chest symptoms, she has been referred to pulmonology by GI.     - Continue Triamcinolone acetonide cream (0.1 percent in [employer]) three to four times daily during attacks of oral ulcers and be used until pain from the ulcer ceases. Potent topical corticosteroids may be used for genital ulcers. Also use magic mouthwash as needed.  - Other comorbidities to watch for in Behcets: Vascular disease in Behçet s is more common in men. Large vessel vascular involvement occurs in approximately one-third of patients with Behcet s disease. Pulmonary artery vasculitis can present with hemoptysis (misdiagnosis  can lead to poor prognosis). Secondary fibromyalgia, CNS disease, amyloidosis, sacroiliitis.     Return in about 4 weeks (around 9/19/2017).    Orders Placed This Encounter   Procedures     HLA-B27 Typing       Medications Discontinued During This Encounter   Medication Reason     Colchicine 0.6 MG CAPS Reorder     Current Outpatient Prescriptions   Medication Sig Dispense Refill     Colchicine 0.6 MG CAPS Take 0.6 mg by mouth See Admin Instructions 2 capsules in AM and 1 capsule in PM 90 capsule 3     order for DME Post op shoe 1 Device 0     hydrOXYzine (ATARAX) 25 MG tablet Take 1-2 tablets (25-50 mg) by mouth every 6 hours as needed for anxiety 60 tablet 1     busPIRone 30 MG TABS Take 15 mg by mouth 2 times daily Increased dose. For anxiety. 60 tablet 1     diphenhydrAMINE (BENADRYL) 25 MG tablet Take 1 tablet (25 mg) by mouth every 8 hours as needed for allergies 30 tablet 0     linaclotide (LINZESS) 290 MCG capsule Take 1 capsule (290 mcg) by mouth every morning (before breakfast) 90 capsule 1     LORazepam (ATIVAN) 1 MG tablet Take 0.5 tablets (0.5 mg) by mouth 2 times daily as needed for other (abdominal pain) Do not operate a vehicle after taking this medication 90 tablet 0     guanFACINE (TENEX) 1 MG tablet Take 3 tablets (3 mg) by mouth twice daily in the morning and evening daily. 180 tablet 3     lactulose 20 GM/30ML SOLN Take 30 mLs by mouth 3 times daily as needed 300 mL 0     sucralfate (CARAFATE) 1 GM/10ML suspension Take 10 mLs (1 g) by mouth 4 times daily 1200 mL 2     diclofenac (VOLTAREN) 1 % GEL topical gel Apply 2-4 grams to affected area(s) up to 4 times per day as needed. This is an anti-inflammatory medication. 100 g 4     metaxalone (SKELAXIN) 800 MG tablet Take 0.5 tablets (400 mg) by mouth 3 times daily as needed for moderate pain 30 tablet 1     ondansetron (ZOFRAN-ODT) 8 MG ODT tab Take 1 tablet (8 mg) by mouth every 8 hours as needed for nausea 60 tablet 11     aspirin (ASPIRIN  CHILDRENS) 81 MG chewable tablet Take 81 mg by mouth as needed        dicyclomine (BENTYL) 20 MG tablet Take 1 tablet (20 mg) by mouth 4 times daily as needed 60 tablet 1     amitriptyline (ELAVIL) 25 MG tablet Take 1 tablet (25 mg) by mouth At Bedtime (Patient taking differently: Take 50 mg by mouth At Bedtime ) 45 tablet 5     omeprazole (PRILOSEC) 40 MG capsule Take 1 capsule (40 mg) by mouth daily 90 capsule 3     EPINEPHrine (EPIPEN 2-EAMON) 0.3 MG/0.3ML injection Inject 0.3 mLs (0.3 mg) into the muscle as needed for anaphylaxis 0.6 mL 3     apremilast (OTEZLA) 30 MG tablet 20 mg in AM and 30mg in PM daily X 2 weeks and then 30mg twice daily. 60 tablet 3     norgestimate-ethinyl estradiol (ORTHO-CYCLEN, SPRINTEC) 0.25-35 MG-MCG per tablet Take 1 tablet by mouth daily 84 tablet 3     albuterol (PROAIR HFA/PROVENTIL HFA/VENTOLIN HFA) 108 (90 BASE) MCG/ACT Inhaler Inhale 2 puffs into the lungs every 6 hours as needed for shortness of breath / dyspnea or wheezing 1 Inhaler 1     promethazine (PHENERGAN) 25 MG tablet Take 0.5-1 tablets (12.5-25 mg) by mouth every 6 hours as needed for nausea 20 tablet 1     clobetasol (TEMOVATE) 0.05 % cream Apply topically 2 times daily 60 g 0     hyoscyamine (ANASPAZ,LEVSIN) 0.125 MG tablet Take 1-2 tablets (125-250 mcg) by mouth every 4 hours as needed for cramping 40 tablet 1     Aspirin-Acetaminophen-Caffeine (EXCEDRIN MIGRAINE PO) Take by mouth as needed        hypromellose (GENTEAL) 0.3 % SOLN 1 drop every hour as needed       betamethasone valerate (VALISONE) 0.1 % cream Apply topically 2 times daily       carbamide peroxide (DEBROX) 6.5 % otic solution 5 drops 2 times daily       Lactase (LACTAID PO) Take by mouth daily       triamcinolone (KENALOG) 0.1 % cream Apply sparingly to oral ulcers three times daily for 14 days as needed. 15 g 1     ONABOTULINUMTOXINA IJ Inject 165 Units as directed once Lot # /C3  Expiration Date: 01/2020       OnabotulinumtoxinA (BOTOX IJ)  Inject 162.5 Units as directed once Lot #: /C3  Exp: 10/2019       OnabotulinumtoxinA (BOTOX IJ) Inject 150 Units into the muscle Lot # /C3  Exp: 8/2019       meclizine (ANTIVERT) 25 MG tablet Take 1 tablet (25 mg) by mouth every 6 hours as needed for dizziness 30 tablet 1     Magic Mouthwash (FV std formula) lidocaine visc 2% 2.5mL/5mL & maalox/mylanta w/ simeth 2.5mL/5mL & diphenhydrAMINE 5mg/5mL Swish and swallow 10 mLs in mouth every 6 hours as needed for mouth sores 1 Bottle 1     OnabotulinumtoxinA (BOTOX IJ) Inject 150 Units into the muscle once Lot: /C3 Exp: 05/2019       botulinum toxin type A (BOTOX) 100 UNITS injection Inject 200 Units into the muscle every 3 months 200 Units 3     lidocaine (XYLOCAINE) 2 % jelly Apply 1 Tube topically daily Reported on 4/21/2017       Austen Marquez MD.           Active Problem List:     Patient Active Problem List    Diagnosis Date Noted     Chronic pain syndrome 07/27/2017     Priority: Medium     Major depressive disorder, recurrent episode, moderate (H) 06/27/2017     Priority: Medium     Cervical pain 05/02/2017     Priority: Medium     Acute left ankle pain 03/31/2017     Priority: Medium     Cervical dystonia 03/28/2017     Priority: Medium     PTSD (post-traumatic stress disorder) 01/17/2017     Priority: Medium     Left knee pain 10/20/2016     Priority: Medium     Patellofemoral instability 10/20/2016     Priority: Medium     Fibromyalgia 08/04/2016     Priority: Medium     Rheumatoid arthritis of multiple sites without rheumatoid factor (H) 08/04/2016     Priority: Medium     Raynaud's disease without gangrene 08/04/2016     Priority: Medium     Chronic abdominal pain 08/04/2016     Priority: Medium     Palpitations 01/12/2016     Priority: Medium     On colchicine therapy 10/30/2015     Priority: Medium     Spell of shaking 05/06/2015     Priority: Medium     Migraine 02/04/2015     Priority: Medium     Problem list name updated by  automated process. Provider to review       Behcet's disease (H) 12/10/2014     Priority: Medium     Headaches due to old head injury 11/12/2013     Priority: Medium     Milk protein intolerance 10/11/2013     Priority: Medium     Concussion 02/13/2013     Priority: Medium     Jan 2013, with prolonged recovery- followed by sports med         Knee pain 01/03/2013     Priority: Medium     TAHIR (generalized anxiety disorder) 06/25/2009     Priority: Medium     Tics - Tourette syndrome 05/18/2009     Priority: Medium     Followed by psychotherapy. Symptoms well managed. Originally diagnosed at [facility] neurolog[facility]. (Dr. Simpson)           IBS (irritable bowel syndrome) 05/18/2009     Priority: Medium     Seasonal allergic rhinitis 05/18/2009     Priority: Medium          History of Present Illness:     Chief Complaint   Patient presents with     RECHECK     Seen Dr. Marilyn Moran Rheumatology in [facility] [place] 10/14:    Original presentation 2014:    Ms Oropeza is a 19 y.o. female who presents with one year of presentation of painful oral ulcers (monthly) once that have worsened with two episodes in the last two months that presented with painful vulvar or vaginal ulcers (2 episodes so far every month) [not sure if they leave scar] as well that timing coincides with menses ([facility] [place]: 8/25/14).   ORAL ULCERS: last two episodes were severe, painful, white base with erythematous halo, that appear and disappear in the matter of 1-2 weeks without, does not bleed and doesn't respond to any treatment used (magic mouthwash), 10-15 in number with the biggest one being dime size.     VAGINAL ULCERS: 2-3 in number between labia majora and minora that occur simultaneously with oral ulcers, painful, non bleeding ulcers that are erythematous, no pus.     FOLLICULITIS: patient reports having spots in legs specially around ankle and knee, but arms, and neck are affected as well. Small lesions that resemble ingrown hair, most are red erythematous elevated  lesions, well demarcated, some with liquid that patient refers as pimple like.     SKIN RASHES: welts and red macules with well defined borders that are pruritic and non-ulcerative on chest, arms and back. Benadryl relieves.     ARTHRALGIAS: In descending order of severity: hips, knees, wrists, shoulders, neck, lumbar, fingers.   C/o morning stiffness in hips, back and neck lasting for about until noon.     Ms. Oropeza reports they present in severe flares and never fully resolve, but do get better. She has seen some swelling and warmth on the affected joints but has not noticed and redness. Has tried Tylenol, ibuprofen, and hydrocodone with not much benefit.     FEVERS: Reports 3-4 sporadic episodes per month of self limited fevers of 38 C that occur during the evenings and associate facial flushing.     HEADACHES: occur daily and are bitemporal and irradiate occipitally, pulsatile in nature, intensity varies from intense to mild, are worsened with movement. On treatment with ibuprofen and somatriptan without any positive results.     VISION CHANGES: Patient reports that suddenly she would only see a gray blotch in her right eyes and would persist like this for a day and a half. Also reports blurriness in her left eye. Presence of pain and burning sensation of eyeball with associated redness. Has changed prescription glasses in the matter of 2 years 3 times. She has had opthalmology exam and was not suggestive of any evidence of uveitis.   She was also evaluated in ED for a dizzy spell.     ABD PAIN W/ INTERMITTENT DIARRHEA AND CONSTIPATION: Patient reports abdominal pain that is intermittent, periods of 7 days without bowel movement and feeling bloated with change of pattern to diarrhea (liquidy without blood nor mucus, non foul smelling). Has taken Bentyl, Zegrid, and rinetidine with mild clinical improvement.  She also reports small red nodules after each needle stick for blood draw.   Neurology saw her back then  and was not impressed with her presentation as physical examination was normal. Also MRI brain normal: Findings: No definite hemorrhage, mass affect, midline shift, or ventriculomegaly is noted.There are a few scattered non specific white matter T2 hyperintensities. Axial diffusion weighted images are unremarkable.     The major vascular structures appear patent. There is opacification and severe mucosal thickening in the right maxillary sinus. The remaining paranasal sinuses, and mastoid air cells are clear. Orbits are unremarkable  She has + HSV-1 IGG. Seen ID. Negative for Herpes simplex virus culture. HSV IGM I/II COMBINATION SENT TO [facility]  RESULTS = <0.91  REF RANGE: <0.91 = NEGATIVE  ID did not think HSV could explain her mouth and genital sores.     CRP within normal limits. HIV negative. CBC within normal limits; UA negative. Creatinine within normal limits   CYNDIE neg.  Antigliadin neg.   ANti TTG neg.   [facility] 2014: Colonoscopy and endoscopy neg; result not available. Not sure if biopsies were done.   Raynaud's phenomenon:  Onset: about 2013  Digits: all fingers  Digital ulcers: no  Color changes: white ---> purple and red  Duration of an attack: About couple of hours per mom  Pain during attack: not clear pain but bruise like feeling  Frequency of attacks: few times a month  Family history of Raynauds: no  GERD: very long time    No hemoptysis.   No miscarriages/ no thrombosis in past.   No family or personal history of psoriasis, ulcerative colitis or chron's disease.   No buttock pain or low back pain or stiffness in AM    Interim history:  Dec 2014- Multiple mouth ulcers and did not eat for 5 days. This coincided with periods. Colchicine 0.6mg PO BID started in Nov 2014.   Prednisone taper in Dec 20mg to 0 in 4 weeks. She also used magic mouthwash. Kenalog cream. After going to the ER, 3 days later her ulcers were better. She thinks it improved after the menstruation stopped.   LMP 3 weeks ago.    COLCHICINE HAS HELPED A LOT REDUCING THE INTENSITY OF MENSTRUATION ASSOCIATED ORAL AND VULVAR ULCERS.     Interim history: 6/15    Since last visit only two episodes of mouth sores- small sized 6   No genital ulcers since last visit.   Colchicine 1.2 mg in AM and 0.6mg in PM keeps her symptoms under control.     Admitted in 5/15:  Admission Diagnoses:  - LUE weakness  - spell  - hx of migraines  - hx of Tourette syndrome  - hx of Behcet's disease    Discharge Diagnoses:   - spell likely 2/2 anxiety  - hx of migraines  - hx of Tourette syndrome  - hx of Behcet's disease    CT and MRI brain was normal.     Seeing Dr. Lilliana Miramontes soon as outpatient.     Dr. Garcia did not find any intraocular inflammation.      Interm 10/30/2015  ED for migraine. Continues to see neuro - MRI brain without lesions noted. Saw GI - upper barium normal. May be considering repeat colo. Worsening lesions in mouth and genitals. Has had continuous lesion in mouth x 2 months. 2 genital ulcers. Had fracture of right sesamoid in foot. Continues to have low grade fevers. New folliculitis on chest, legs and arms.     Interim hx: 1/11/2016    Pt states that since last visit she continues to have oral ulcers and has noted more folliculitis-like lesions on her lower extremities.  She states that on her right lower leg she had a red macular spot that has since resolved.  She denies uveitis.  She states that the colchicine initially helped but then stopped working.  She takes 0.6 mg TID.  She stopped taking her prednisone last week as she feels like this hasn't helped.  She hasn't had any episodes of vaginal ulcers.      She saw GI who stated she has gastroparesis.  She is seeing Cardiology later this week for possible syncopal episodes.      Interim history 5/16  Mouth ulcers X 1 last month  Genital ulcers - none since last visit.     Bilateral MCPs pain, knee pain and low back pain +ve increased since last visit  Morning stiffness X 2 hours  "(increasing)   5-6/10    \"overall myalgia\" has gotten worse    C/o pseudofolliculitis lesion on anterior shins bilaterally worse    Interim history: (7/18/2016)  Patient has just started Humira for 2 doses on 7/1 and 7/15 and reported minimal improvement of her hip pain but otherwise, did not notice anything different.     She reported painful mouth ulcer once a month since last visit but noticed that it has been getting deeper.   Denied any genital ulcers.    Still has ongoing bilateral MCPs pain, knee pain but this has been intermittent and she did not have any much pain today. She reported 2-3 hours morning stiffness of both hands and pain score of 6/10 at her knees and 8/10 of her hands but currently takes no pain medication except prn ativan which she takes when her muscle is really tight.     The only concern is that she gained weight even though she has not been eating much (eat once a day) and do exercises every day for 1 hour (with video of yoga, pilates). Her mother wonders if she needs to have some labs work up include sex hormone, thyroid, cortisol.    Interval history 9/14/16  Patient was started Humira at the last visit, patient reports worsening of skin ulcers since starting Humira and stopped taking Humura for the past 2 weeks. Patient reports oral and genital ulcers has been stable even before starting Humira and has not had any interval oral/genital ulcers. However she reports recurrent skin ulcers occurring on a daily basis that affects her chest and anterior legs. Patient also has stopped taking prednisone, she reports that she doesn't feel the prednisone helps, her last dose of prednisone was 6+ months ago. Patient also report worsening low back pain that sometimes causes her to limp.    In the interval patient also visited ED on 8/31/16 for left hand pain and what the patient describes as phlebitis, no medication or procedure was done and the patient was discharged with a splint. Patient also " reported 1 episode of chest pressure that was associated with dyspnea and numbness of the left arm, she was shopping in a supermarket at the time of onset, and the pressure resolved after she took a nap.    Patient denies any infectious symptoms in the interval, no fever, chills, night sweat, cough, dysuria, denies eye pain or visual changes.    November 4, 2016  Oct - had one genital ulcer  Oral ulcers X 5- around menstruation time.   Knee pain- chronic on the left side  Dizziness spells - on and off; been to the ER for that in the past.   On and off migraines  July 2013 - s/p MPFL reconstruction plus LRL 7/2013  Morning stiffness in knee (left) X 1 hour    Severe jaw pain and chest pain- patient wondering if this is Tourette's or esophageal spasms. Cardiac workup negative.   Fever     January 13, 2017  [place] [facility] [place] from [place].   Chest pain on and off still continues. Painful with deep breaths.   Oral ulcers - few minor ones lasting for one week;   One major one in roof of mouth lasting for about one week.   Knee pain +ve - reviewed the recent MRI with her orthopedic surgeon.   On and off migraines  otezla is approved. Still waiting to receive the meds.     March 31, 2017  Otezla current dose 15mg PO BID.   She is tolerating okay at this dose and is willing to increase the dose further up to 30mg BID.   Her joint pains are better on otezla. Knee pain is also better.   Back and neck pain +ve 8/10 when it is worse. Intramuscular botox injections were given on Monday in PMR.   2 minor genital ulcers in the interim- resolved.   Few oral ulcers in the interim- resolved.   Nasal ulcer - resolved.   Migraines on and off.   Nausea with otezla but improving.   Dizziness and blackouts on and off. She fell once and hurt her ankle. Wearing boot in left leg.   Complete ROS negative except for above    May 17, 2017  Tolerating otezla better. Not throwing up anymore. Current dose 20mg in AM  and 30mg in PM (2nd week).   Patient thinks that her oral ulcers frequency and severity are improving with otezla. Also no genital ulcers in the interim.   Currently on doxycycline for bronchitis/?pneunomia. 4 more days left.     Joint pain history  2 weeks ago/ dx with pneumonia 1 week ago/  Involved joints: all over  Pain scale: 6.5/10   Wakes the patient from sleep : Yes  Morning stiffness: Yes for 120 minutes  Meds used:otezla,colchicine     Interim history  Since last visit:  1. Infections - Yes/ pneumonia  2. New symptoms/medical problem - Yes/ thinks she may have had a mini-seizure about 10 days ago ; did not seek medical attention; did not reoccur after that. Patient seeing PCP today.  3. Any side effects from Rheum medications -vomiting a lot (resolved)  3. ER visits/Hospitalizations/surgeries - Yes  4. Last PCP visit: has an apt. today    Patient still getting double vision. Floaters present.     Therapist recommended psychiatry evaluation. Patient does not want to see psychaitry. Patient will see PCP today.     August 22, 2017  Have you ever seen a rheumatologist Yes Who You When 5/17/17    NO genital ulcers in the interim.   Mouth ulcers- three in the back of the throat and roof of the mouth last month.     Joint pain history  Onset: doing alittle worse / cut her otezla back to 30mg  Daily due to GI problems  Involved joints: all over her body. Been having more black out episodes, been having more chest pains.also has raised bumps on both legs from the knees down that itch and are painful   Pain scale:  5.5/10     Wakes the patient from sleep : Yes  Morning stiffness:Yes for 120 minutes  Meds used:otezla, colchicine (three pills daily)     Interim history  Since last visit:  1. Infections - Yes stomach issue  2. New symptoms/medical problem - No  3. Any side effects from Rheum medications -nausea from otezla  3. ER visits/Hospitalizations/surgeries - Yes, ER for foot, ankle injury from passing out and  fell down the steps. Hit her head another time from passing out. Alcohol poisoning in July 4. Last PCP visit: 6/23/17         Past Medical History:     Past Medical History:   Diagnosis Date     Anxiety      Arthritis      Behcet's disease (H)      Cervical adenitis May 2010     Chronic abdominal pain      Constipation, chronic 1994     Gastro-oesophageal reflux disease      Gastroparesis      Migraines      Neuromuscular disorder (H)     fibramyalgia     Palpitations      Seizure (H)      Seizures (H)     unknown etiology     Syncope      Tourette's      Past Surgical History:   Procedure Laterality Date     ARTHROSCOPY KNEE WITH PATELLAR REALIGNMENT  7/25/2013    Procedure: ARTHROSCOPY KNEE WITH PATELLAR REALIGNMENT;  Left Knee Arthroscopy, Medial Patellofemoral Ligament Reconstruction with Allograft  ;  Surgeon: Jennifer Acevedo MD;  Location:  OR     DENTAL SURGERY  1996    Teeth removal     ENDOSCOPY UPPER, COLONOSCOPY, COMBINED  2005     HC ESOPH/GAS REFLUX TEST W NASAL IMPED >1 HR N/A 2/15/2017    Procedure: ESOPHAGEAL IMPEDENCE FUNCTION TEST WITH 24 HOUR PH GREATER THAN 1 HOUR;  Surgeon: Timothy Matta MD;  Location:  GI          Social History:     Social History     Occupational History     Not on file.     Social History Main Topics     Smoking status: Never Smoker     Smokeless tobacco: Never Used     Alcohol use Yes      Comment: seldom     Drug use: No     Sexual activity: No          Family History:     Family History   Problem Relation Age of Onset     Depression Mother      Neurologic Disorder Mother      Migraines, take imitrex injection.  Also in maternal grandmother.       Alcohol/Drug Father      Hypertension Father      Depression Father      Cardiovascular Maternal Grandmother      Depression Maternal Grandmother      Hypertension Maternal Grandmother      Alzheimer Disease Maternal Grandmother      Cardiovascular Maternal Grandfather      Hypertension Maternal Grandfather       Depression Maternal Grandfather      Alcohol/Drug Maternal Grandfather      Cardiovascular Paternal Grandmother      Hypertension Paternal Grandmother      Cardiovascular Paternal Grandfather      Hypertension Paternal Grandfather      Glaucoma No family hx of      Macular Degeneration No family hx of           Allergies:     Allergies   Allergen Reactions     Amoxil [Penicillins] Rash     Dad unsure of reaction.     Bee Venom Anaphylaxis     Contrast Dye Rash     Contrast Media Ready-Box [facility], 04/09/2014.; Contrast [employer] [facility], 04/09/2014.  NOTE: this is a contrast media oral with iodine. Premedicate with methylpred standard for IV contrast, request barium contrast for oral contrast.     Kiwi Swelling     Orange Fruit [Citrus] Anaphylaxis     Pineapple Anaphylaxis, Difficulty breathing and Rash     Milk Protein Extract Hives     Reglan [Metoclopramide] Other (See Comments)     IV dose only, in ER, rapid heart rate.     Ace Inhibitors      Difficulty in breathing and GI upset     Amitiza [Lubiprostone] Nausea and Vomiting     Amoxicillin-Pot Clavulanate      Latex      Midazolam Unknown     parent states that when pt takes this medication, she wakes up being very violent .     Nuts      Contrast [employer] [facility], 04/09/2014.; Contrast [employer], 04/09/2014.       Versed      Coming out of pelvic exam at age of 5, was kicking and screaming when coming out of the versed.     Adhesive Tape Rash     Azithromycin Hives and Rash     Cephalexin Itching and Rash     Itchy mouth     Keflex [Cephalexin-Fd&C Yellow #6] Hives          Medications:     Current Outpatient Prescriptions   Medication Sig Dispense Refill     Colchicine 0.6 MG CAPS Take 0.6 mg by mouth See Admin Instructions 2 capsules in AM and 1 capsule in PM 90 capsule 3     order for DME Post op shoe 1 Device 0     hydrOXYzine (ATARAX) 25 MG tablet Take 1-2 tablets (25-50 mg) by mouth every 6 hours as needed for anxiety 60 tablet 1      busPIRone 30 MG TABS Take 15 mg by mouth 2 times daily Increased dose. For anxiety. 60 tablet 1     diphenhydrAMINE (BENADRYL) 25 MG tablet Take 1 tablet (25 mg) by mouth every 8 hours as needed for allergies 30 tablet 0     linaclotide (LINZESS) 290 MCG capsule Take 1 capsule (290 mcg) by mouth every morning (before breakfast) 90 capsule 1     LORazepam (ATIVAN) 1 MG tablet Take 0.5 tablets (0.5 mg) by mouth 2 times daily as needed for other (abdominal pain) Do not operate a vehicle after taking this medication 90 tablet 0     guanFACINE (TENEX) 1 MG tablet Take 3 tablets (3 mg) by mouth twice daily in the morning and evening daily. 180 tablet 3     lactulose 20 GM/30ML SOLN Take 30 mLs by mouth 3 times daily as needed 300 mL 0     sucralfate (CARAFATE) 1 GM/10ML suspension Take 10 mLs (1 g) by mouth 4 times daily 1200 mL 2     diclofenac (VOLTAREN) 1 % GEL topical gel Apply 2-4 grams to affected area(s) up to 4 times per day as needed. This is an anti-inflammatory medication. 100 g 4     metaxalone (SKELAXIN) 800 MG tablet Take 0.5 tablets (400 mg) by mouth 3 times daily as needed for moderate pain 30 tablet 1     ondansetron (ZOFRAN-ODT) 8 MG ODT tab Take 1 tablet (8 mg) by mouth every 8 hours as needed for nausea 60 tablet 11     aspirin (ASPIRIN CHILDRENS) 81 MG chewable tablet Take 81 mg by mouth as needed        dicyclomine (BENTYL) 20 MG tablet Take 1 tablet (20 mg) by mouth 4 times daily as needed 60 tablet 1     amitriptyline (ELAVIL) 25 MG tablet Take 1 tablet (25 mg) by mouth At Bedtime (Patient taking differently: Take 50 mg by mouth At Bedtime ) 45 tablet 5     omeprazole (PRILOSEC) 40 MG capsule Take 1 capsule (40 mg) by mouth daily 90 capsule 3     EPINEPHrine (EPIPEN 2-EAMON) 0.3 MG/0.3ML injection Inject 0.3 mLs (0.3 mg) into the muscle as needed for anaphylaxis 0.6 mL 3     apremilast (OTEZLA) 30 MG tablet 20 mg in AM and 30mg in PM daily X 2 weeks and then 30mg twice daily. 60 tablet 3      norgestimate-ethinyl estradiol (ORTHO-CYCLEN, SPRINTEC) 0.25-35 MG-MCG per tablet Take 1 tablet by mouth daily 84 tablet 3     albuterol (PROAIR HFA/PROVENTIL HFA/VENTOLIN HFA) 108 (90 BASE) MCG/ACT Inhaler Inhale 2 puffs into the lungs every 6 hours as needed for shortness of breath / dyspnea or wheezing 1 Inhaler 1     promethazine (PHENERGAN) 25 MG tablet Take 0.5-1 tablets (12.5-25 mg) by mouth every 6 hours as needed for nausea 20 tablet 1     clobetasol (TEMOVATE) 0.05 % cream Apply topically 2 times daily 60 g 0     hyoscyamine (ANASPAZ,LEVSIN) 0.125 MG tablet Take 1-2 tablets (125-250 mcg) by mouth every 4 hours as needed for cramping 40 tablet 1     Aspirin-Acetaminophen-Caffeine (EXCEDRIN MIGRAINE PO) Take by mouth as needed        hypromellose (GENTEAL) 0.3 % SOLN 1 drop every hour as needed       betamethasone valerate (VALISONE) 0.1 % cream Apply topically 2 times daily       carbamide peroxide (DEBROX) 6.5 % otic solution 5 drops 2 times daily       Lactase (LACTAID PO) Take by mouth daily       triamcinolone (KENALOG) 0.1 % cream Apply sparingly to oral ulcers three times daily for 14 days as needed. 15 g 1     ONABOTULINUMTOXINA IJ Inject 165 Units as directed once Lot # /C3  Expiration Date: 01/2020       OnabotulinumtoxinA (BOTOX IJ) Inject 162.5 Units as directed once Lot #: /C3  Exp: 10/2019       OnabotulinumtoxinA (BOTOX IJ) Inject 150 Units into the muscle Lot # /C3  Exp: 8/2019       meclizine (ANTIVERT) 25 MG tablet Take 1 tablet (25 mg) by mouth every 6 hours as needed for dizziness 30 tablet 1     Magic Mouthwash (FV std formula) lidocaine visc 2% 2.5mL/5mL & maalox/mylanta w/ simeth 2.5mL/5mL & diphenhydrAMINE 5mg/5mL Swish and swallow 10 mLs in mouth every 6 hours as needed for mouth sores 1 Bottle 1     OnabotulinumtoxinA (BOTOX IJ) Inject 150 Units into the muscle once Lot: /C3 Exp: 05/2019       botulinum toxin type A (BOTOX) 100 UNITS injection Inject 200 Units  "into the muscle every 3 months 200 Units 3     lidocaine (XYLOCAINE) 2 % jelly Apply 1 Tube topically daily Reported on 4/21/2017            Physical Exam:   Blood pressure 110/72, pulse 80, temperature 97.6  F (36.4  C), temperature source Oral, height 1.6 m (5' 3\"), weight 65.8 kg (145 lb), last menstrual period 07/31/2017, SpO2 99 %, not currently breastfeeding.  Wt Readings from Last 4 Encounters:   08/22/17 65.8 kg (145 lb)   08/20/17 65.8 kg (145 lb)   08/16/17 65.8 kg (145 lb)   07/31/17 65.8 kg (145 lb)     Constitutional: well-developed, appearing stated age; cooperative  Eyes: nl EOM, PERRLA, vision, conjunctiva, sclera  ENT: No oral ulcer seen.   nl external ears, nose, hearing, lips, teeth, gums, throat  No mucous membrane lesions, normal saliva pool  Neck: no mass or thyroid enlargement  Resp: lungs clear to auscultation,   CV: RRR, no murmurs, rubs or gallops, no edema  GI: no ABD mass or tenderness, no HSM  : not tested  Lymph: no cervical, supraclavicular or epitrochlear nodes  MS:  All shoulder, elbow, wrist, MCP/PIP/DIP, hip, ankle, and foot MTP/IP joints were examined and  found normal except tenderness on even light touch of both knees with minimal swelling but no warmth. No active synovitis or deformity. Full ROM.  Normal  strength. Fist 100%.  No dactylitis,  tenosynovitis, enthesopathy.  Skin: Right leg raised erythematous spots noted in legs and feet. Right hand - engorged veins noted in the palm.  no nail pitting, alopecia, rash  Psych: nl judgement, orientation, memory, affect.         Data:     CBC RESULTS:   Recent Labs   Lab Test  06/06/17 2048   WBC  4.7   RBC  4.09   HGB  12.0   HCT  35.9   MCV  88   MCH  29.3   MCHC  33.4   RDW  13.1   PLT  189       Liver Function Studies -   Recent Labs   Lab Test  06/06/17 2048   PROTTOTAL  6.7*   ALBUMIN  3.8   BILITOTAL  0.3   ALKPHOS  91   AST  26   ALT  44       Creatinine   Date Value Ref Range Status   06/06/2017 0.77 0.52 - 1.04 " mg/dL Final   ]    No results found for: URIC]    HLA-B27  No results for input(s): G17VCRJCOS, B1 in the last 17892 hours.  RF/CCP  Recent Labs   Lab Test  05/06/16   1554   CCPIGG  1   RHF  <20     CYNDIE/RNP/Sm/SSA/SSB  Recent Labs   Lab Test  11/01/16   1318   TREPAB  Negative     ESR/CRP  Recent Labs   Lab Test  04/21/17   1249  04/14/17   1351  11/06/16   1341  03/11/16   1350  11/18/15   1453   SED   --    --   4  5  6   CRP  <2.9  <2.9  <2.9  <2.9  <2.9

## 2017-08-22 ENCOUNTER — OFFICE VISIT (OUTPATIENT)
Dept: RHEUMATOLOGY | Facility: CLINIC | Age: 22
End: 2017-08-22
Payer: COMMERCIAL

## 2017-08-22 VITALS
DIASTOLIC BLOOD PRESSURE: 72 MMHG | TEMPERATURE: 97.6 F | OXYGEN SATURATION: 99 % | WEIGHT: 145 LBS | HEIGHT: 63 IN | HEART RATE: 80 BPM | BODY MASS INDEX: 25.69 KG/M2 | SYSTOLIC BLOOD PRESSURE: 110 MMHG

## 2017-08-22 DIAGNOSIS — M35.2 BEHCET'S SYNDROME (H): ICD-10-CM

## 2017-08-22 DIAGNOSIS — G89.29 CHRONIC BILATERAL LOW BACK PAIN WITHOUT SCIATICA: Primary | ICD-10-CM

## 2017-08-22 DIAGNOSIS — M54.50 CHRONIC BILATERAL LOW BACK PAIN WITHOUT SCIATICA: Primary | ICD-10-CM

## 2017-08-22 PROCEDURE — 99214 OFFICE O/P EST MOD 30 MIN: CPT | Performed by: INTERNAL MEDICINE

## 2017-08-22 RX ORDER — COLCHICINE 0.6 MG/1
1 CAPSULE ORAL SEE ADMIN INSTRUCTIONS
Qty: 90 CAPSULE | Refills: 3 | Status: SHIPPED | OUTPATIENT
Start: 2017-08-22 | End: 2018-01-16

## 2017-08-22 ASSESSMENT — ROUTINE ASSESSMENT OF PATIENT INDEX DATA (RAPID3)
RAPID3 INTERPRETATION: HIGH > 12.0
TOTAL RAPID3 SCORE: 13.8

## 2017-08-22 NOTE — MR AVS SNAPSHOT
After Visit Summary   8/22/2017    Samara Oropeza    MRN: [id number]           Patient Information     Date Of Birth          1995        Visit Information        Provider Department      8/22/2017 11:00 AM Austen Marquez MD [facility]        Today's Diagnoses     Chronic bilateral low back pain without sciatica    -  1    Behcet's syndrome (H)           Follow-ups after your visit        Follow-up notes from your care team     Return in about 4 weeks (around 9/19/2017).      Your next 10 appointments already scheduled     Aug 25, 2017  2:50 PM CDT   (Arrive by 2:35 PM)   LUIZ Extremity with Jennifer Alvarenga, PT   [facility] ([facility])    [address] [place] [address] [phone]            Aug 30, 2017  2:50 PM CDT   LUIZ Extremity with Jennifer Alvarenga PT   [facility] ([facility])    [address] [place] [address] [phone]            Aug 31, 2017  9:00 AM CDT   Return Visit with EVI Dahl CNP   [facility] ([facility]    [address]  [facility] [id number] [phone]            Aug 31, 2017 10:00 AM CDT   Return Visit with Emily Rollins PT   [facility] ([facility])    [address]  [facility] [id number] [phone]            Aug 31, 2017  1:00 PM CDT   Return Visit with Kimo Hudson, PAULETTE   [facility] [place] ([facility] [place])    [address] [facility] [id number] [phone]            Sep 05, 2017 12:30 PM CDT   Return Visit with ALISA Burt   [facility] ([facility])    [address] [id number] [phone]            Sep 05,  "2017  3:50 PM CDT   LUIZ Extremity with Jennifer Alvarenga PT   [facility] ([facility])    [address] [facility] 5th Floor  [facility] [address] [phone]            Sep 12, 2017  1:00 PM CDT   Return Visit with Kimo Hudson LP   [facility] ([facility] [place])    [address] [facility]  [place] [address] [phone]            Sep 13, 2017  3:00 PM CDT   (Arrive by 2:45 PM)   Return Botox with Frederick Bender MD   [facility] ([facility])    [address] [facility]  3rd Floor  [facility] [address] [phone]            Sep 19, 2017 10:30 AM CDT   Return Visit with Layla Browne [facility] ([facility])    [address] [address] [phone]              Who to contact     If you have questions or need follow up information about today's clinic visit or your schedule please contact [facility] directly at [phone].  Normal or non-critical lab and imaging results will be communicated to you by MyChart, letter or phone within 4 business days after the clinic has received the results. If you do not hear from us within 7 days, please contact the clinic through MyChart or phone. If you have a critical or abnormal lab result, we will notify you by phone as soon as possible.  Submit refill requests through [employer] or call your pharmacy and they will forward the refill request to us. Please allow 3 business days for your refill to be completed.          Additional Information About Your Visit        MyChart Information     [employer] lets you send messages to your doctor, view your test results, renew your prescriptions, schedule appointments and more. To sign up, go to [email or web address] . Click on \"Log in\" on the left side of " "the screen, which will take you to the Welcome page. Then click on \"Sign up Now\" on the right side of the page.     You will be asked to enter the access code listed below, as well as some personal information. Please follow the directions to create your username and password.     Your access code is: [email or web address]  Expires: 10/29/2017 10:26 AM     Your access code will  in 90 days. If you need help or a new code, please call your [facility] or [phone].        Care EveryWhere ID     This is your Care EveryWhere ID. This could be used by other organizations to access your [facility] medical records  [id number]        Your Vitals Were     Pulse Temperature Height Last Period Pulse Oximetry BMI (Body Mass Index)    80 97.6  F (36.4  C) (Oral) 1.6 m (5' 3\") 2017 99% 25.69 kg/m2       Blood Pressure from Last 3 Encounters:   17 110/72   17 103/66   17 102/60    Weight from Last 3 Encounters:   17 65.8 kg (145 lb)   17 65.8 kg (145 lb)   17 65.8 kg (145 lb)              We Performed the Following     HLA-B27 Typing          Today's Medication Changes          These changes are accurate as of: 17 11:55 AM.  If you have any questions, ask your nurse or doctor.               These medicines have changed or have updated prescriptions.        Dose/Directions    amitriptyline 25 MG tablet   Commonly known as:  ELAVIL   This may have changed:  how much to take   Used for:  Atypical chest pain, Insomnia, unspecified type        Dose:  25 mg   Take 1 tablet (25 mg) by mouth At Bedtime   Quantity:  45 tablet   Refills:  5            Where to get your medicines      These medications were sent to [facility] - [address] [address]     Phone:  [phone]     Colchicine 0.6 MG Caps                Primary Care Provider Office Phone # Fax #    EVI Cardona CNP " [phone]       [address]  [facility] [address]        Equal Access to Services     NABIL GALEANO : Hadii malcolm jose antonio brandyn Santiago, waluigida luqadaha, qaybta kaalmada yessica, mike parsons. So [facility] [phone].    ATENCIÓN: Si habla español, tiene a livingston disposición servicios gratuitos de asistencia lingüística. Llame al [phone].    We comply with applicable federal civil rights laws and [place] laws. We do not discriminate on the basis of race, color, national origin, age, disability sex, sexual orientation or gender identity.            Thank you!     Thank you for choosing [facility]  for your care. Our goal is always to provide you with excellent care. Hearing back from our patients is one way we can continue to improve our services. Please take a few minutes to complete the written survey that you may receive in the mail after your visit with us. Thank you!             Your Updated Medication List - Protect others around you: Learn how to safely use, store and throw away your medicines at www.disposemymeds.org.          This list is accurate as of: 8/22/17 11:55 AM.  Always use your most recent med list.                   Brand Name Dispense Instructions for use Diagnosis    albuterol 108 (90 BASE) MCG/ACT Inhaler    PROAIR HFA/PROVENTIL HFA/VENTOLIN HFA    1 Inhaler    Inhale 2 puffs into the lungs every 6 hours as needed for shortness of breath / dyspnea or wheezing    Atypical chest pain       amitriptyline 25 MG tablet    ELAVIL    45 tablet    Take 1 tablet (25 mg) by mouth At Bedtime    Atypical chest pain, Insomnia, unspecified type       apremilast 30 MG tablet    OTEZLA    60 tablet    20 mg in AM and 30mg in PM daily X 2 weeks and then 30mg twice daily.    Behcet's disease (H)       ASPIRIN CHILDRENS 81 MG chewable tablet   Generic drug:  aspirin      Take 81 mg by mouth as needed        betamethasone valerate 0.1 %  cream    VALISONE     Apply topically 2 times daily        * botulinum toxin type A 100 UNITS injection    BOTOX    200 Units    Inject 200 Units into the muscle every 3 months    Cervical dystonia       * BOTOX IJ      Inject 150 Units into the muscle once Lot: /C3 Exp: 05/2019        * BOTOX IJ      Inject 150 Units into the muscle Lot # /C3  Exp: 8/2019        * BOTOX IJ      Inject 162.5 Units as directed once Lot #: /C3 Exp: 10/2019        * ONABOTULINUMTOXINA IJ      Inject 165 Units as directed once Lot # /C3 Expiration Date: 01/2020        BusPIRone HCl 30 MG Tabs     60 tablet    Take 15 mg by mouth 2 times daily Increased dose. For anxiety.    TAHIR (generalized anxiety disorder)       carbamide peroxide 6.5 % otic solution    DEBROX     5 drops 2 times daily        clobetasol 0.05 % cream    TEMOVATE    60 g    Apply topically 2 times daily    Folliculitis       Colchicine 0.6 MG Caps     90 capsule    Take 0.6 mg by mouth See Admin Instructions 2 capsules in AM and 1 capsule in PM    Behcet's syndrome (H)       diclofenac 1 % Gel topical gel    VOLTAREN    100 g    Apply 2-4 grams to affected area(s) up to 4 times per day as needed. This is an anti-inflammatory medication.    Polyarthralgia       dicyclomine 20 MG tablet    BENTYL    60 tablet    Take 1 tablet (20 mg) by mouth 4 times daily as needed    Abdominal cramping       diphenhydrAMINE 25 MG tablet    BENADRYL    30 tablet    Take 1 tablet (25 mg) by mouth every 8 hours as needed for allergies        EPINEPHrine 0.3 MG/0.3ML injection 2-pack    EPIPEN 2-EAMON    0.6 mL    Inject 0.3 mLs (0.3 mg) into the muscle as needed for anaphylaxis    Hx of bee sting allergy       EXCEDRIN MIGRAINE PO      Take by mouth as needed        guanFACINE 1 MG tablet    TENEX    180 tablet    Take 3 tablets (3 mg) by mouth twice daily in the morning and evening daily.    Tic       hydrOXYzine 25 MG tablet    ATARAX    60 tablet    Take 1-2 tablets  (25-50 mg) by mouth every 6 hours as needed for anxiety    TAHIR (generalized anxiety disorder)       hyoscyamine 0.125 MG tablet    ANASPAZ/LEVSIN    40 tablet    Take 1-2 tablets (125-250 mcg) by mouth every 4 hours as needed for cramping    Abdominal pain, generalized       hypromellose 0.3 % Soln ophthalmic solution    GENTEAL     1 drop every hour as needed        LACTAID PO      Take by mouth daily        lactulose 20 GM/30ML Soln     300 mL    Take 30 mLs by mouth 3 times daily as needed        lidocaine 2 % topical gel    XYLOCAINE     Apply 1 Tube topically daily Reported on 4/21/2017        lidocaine visc 2% 2.5mL/5mL & maalox/mylanta w/ simeth 2.5mL/5mL & diphenhydrAMINE 5mg/5mL Susp suspension    [facility]    1 Bottle    Swish and swallow 10 mLs in mouth every 6 hours as needed for mouth sores    Behcet's syndrome (H)       linaclotide 290 MCG capsule    LINZESS    90 capsule    Take 1 capsule (290 mcg) by mouth every morning (before breakfast)    Irritable bowel syndrome       LORazepam 1 MG tablet    ATIVAN    90 tablet    Take 0.5 tablets (0.5 mg) by mouth 2 times daily as needed for other (abdominal pain) Do not operate a vehicle after taking this medication    Chronic abdominal pain       meclizine 25 MG tablet    ANTIVERT    30 tablet    Take 1 tablet (25 mg) by mouth every 6 hours as needed for dizziness    Nausea       metaxalone 800 MG tablet    SKELAXIN    30 tablet    Take 0.5 tablets (400 mg) by mouth 3 times daily as needed for moderate pain    Sprain of neck, initial encounter, Cervical dystonia       norgestimate-ethinyl estradiol 0.25-35 MG-MCG per tablet    ORTHO-CYCLEN, SPRINTEC    84 tablet    Take 1 tablet by mouth daily    General counseling for prescription of oral contraceptives       omeprazole 40 MG capsule    priLOSEC    90 capsule    Take 1 capsule (40 mg) by mouth daily    Gastroesophageal reflux disease, esophagitis presence not specified       ondansetron 8 MG  ODT tab    ZOFRAN-ODT    60 tablet    Take 1 tablet (8 mg) by mouth every 8 hours as needed for nausea    Non-intractable vomiting with nausea, unspecified vomiting type, Hematemesis, presence of nausea not specified       order for DME     1 Device    Post op shoe    Contusion of right foot, initial encounter       promethazine 25 MG tablet    PHENERGAN    20 tablet    Take 0.5-1 tablets (12.5-25 mg) by mouth every 6 hours as needed for nausea    Intractable chronic migraine without aura and without status migrainosus       sucralfate 1 GM/10ML suspension    CARAFATE    1200 mL    Take 10 mLs (1 g) by mouth 4 times daily    Bile reflux gastritis, Nausea       triamcinolone 0.1 % cream    KENALOG    15 g    Apply sparingly to oral ulcers three times daily for 14 days as needed.    Behcet's syndrome (H)       * Notice:  This list has 5 medication(s) that are the same as other medications prescribed for you. Read the directions carefully, and ask your doctor or other care provider to review them with you.

## 2017-08-22 NOTE — LETTER
[address] [address]  [phone]      8/22/2017           Please excuse Kishor Sun from work today due to he was at an appointment for his daughter. If you have any questions please contact us at [phone].      Dr. Marquez  Rheumatology

## 2017-08-22 NOTE — NURSING NOTE
"Chief Complaint   Patient presents with     RECHECK       Initial /72 (BP Location: Left arm, Patient Position: Chair, Cuff Size: Adult Regular)  Pulse 80  Temp 97.6  F (36.4  C) (Oral)  Ht 1.6 m (5' 3\")  Wt 65.8 kg (145 lb)  LMP 07/31/2017  SpO2 99%  BMI 25.69 kg/m2 Estimated body mass index is 25.69 kg/(m^2) as calculated from the following:    Height as of this encounter: 1.6 m (5' 3\").    Weight as of this encounter: 65.8 kg (145 lb).  Medication Reconciliation: complete    Have you ever seen a rheumatologist Yes Who You When 5/17/17  Joint pain history  Onset: doing alittle worse / cut her otezla back to 30mg  Daily due to GI problems  Involved joints: all over her body. Been having more black out episodes, been having more chest pains.also has raised bumps on both legs from the knees down that itch and are painful  Pain scale:  5.5/10     Wakes the patient from sleep : Yes  Morning stiffness:Yes for 120 minutes  Meds used:otezla, colchicine    Interim history  Since last visit:  1. Infections - Yes stomach issue  2. New symptoms/medical problem - No  3. Any side effects from Rheum medications -nausea from otezla  3. ER visits/Hospitalizations/surgeries - Yes, ER for foot, ankle injury from passing out and fell down the steps. Hit her head another time from passing out. Alcohol poisoning in July 4. Last PCP visit: 6/23/17  Wt Readings from Last 4 Encounters:   08/22/17 65.8 kg (145 lb)   08/20/17 65.8 kg (145 lb)   08/16/17 65.8 kg (145 lb)   07/31/17 65.8 kg (145 lb)     BP Readings from Last 3 Encounters:   08/22/17 110/72   08/20/17 103/66   08/16/17 102/60       "

## 2017-08-30 ENCOUNTER — THERAPY VISIT (OUTPATIENT)
Dept: PHYSICAL THERAPY | Facility: CLINIC | Age: 22
End: 2017-08-30
Payer: COMMERCIAL

## 2017-08-30 DIAGNOSIS — M25.562 LEFT KNEE PAIN: Primary | ICD-10-CM

## 2017-08-30 PROCEDURE — 97161 PT EVAL LOW COMPLEX 20 MIN: CPT | Mod: GP | Performed by: PHYSICAL THERAPIST

## 2017-08-30 PROCEDURE — 97110 THERAPEUTIC EXERCISES: CPT | Mod: GP | Performed by: PHYSICAL THERAPIST

## 2017-08-30 PROCEDURE — 97112 NEUROMUSCULAR REEDUCATION: CPT | Mod: GP | Performed by: PHYSICAL THERAPIST

## 2017-08-30 ASSESSMENT — ACTIVITIES OF DAILY LIVING (ADL)
PAIN: THE SYMPTOM AFFECTS MY ACTIVITY MODERATELY
RISE FROM A CHAIR: NOT ANSWERED
WALK: NOT ANSWERED
AS_A_RESULT_OF_YOUR_KNEE_INJURY,_HOW_WOULD_YOU_RATE_YOUR_CURRENT_LEVEL_OF_DAILY_ACTIVITY?: NOT ANSWERED
GO DOWN STAIRS: NOT ANSWERED
STIFFNESS: THE SYMPTOM AFFECTS MY ACTIVITY MODERATELY
KNEEL ON THE FRONT OF YOUR KNEE: NOT ANSWERED
LIMPING: THE SYMPTOM AFFECTS MY ACTIVITY SLIGHTLY
SQUAT: NOT ANSWERED
GO UP STAIRS: NOT ANSWERED
WEAKNESS: THE SYMPTOM AFFECTS MY ACTIVITY MODERATELY
SIT WITH YOUR KNEE BENT: NOT ANSWERED
STAND: NOT ANSWERED
GIVING WAY, BUCKLING OR SHIFTING OF KNEE: THE SYMPTOM AFFECTS MY ACTIVITY SLIGHTLY
SWELLING: THE SYMPTOM AFFECTS MY ACTIVITY SLIGHTLY
HOW_WOULD_YOU_RATE_THE_OVERALL_FUNCTION_OF_YOUR_KNEE_DURING_YOUR_USUAL_DAILY_ACTIVITIES?: NOT ANSWERED
KNEE_ACTIVITY_OF_DAILY_LIVING_SUM: 14

## 2017-08-30 NOTE — MR AVS SNAPSHOT
After Visit Summary   8/30/2017    Samara Oropeza    MRN: [id number]           Patient Information     Date Of Birth          1995        Visit Information        Provider Department      8/30/2017 2:50 PM Jennifer Alvarenga PT [facility]        Today's Diagnoses     Left knee pain    -  1       Follow-ups after your visit        Your next 10 appointments already scheduled     Aug 31, 2017  9:00 AM CDT   Return Visit with EVI Dahl CNP   [facility] ([facility])    [address]  [address]  [facility] [id number] [phone]            Aug 31, 2017 10:00 AM CDT   Return Visit with Emily Rollins PT   [facility] ([facility])    [address]  [address]  [facility] [id number] [phone]            Aug 31, 2017  1:00 PM CDT   Return Visit with Kimo Hudson LP   [facility] [place] ([facility] [place])    [address]  [place] [phone] [phone]            Sep 05, 2017 12:30 PM CDT   Return Visit with ALISA Burt   [facility] ([facility])    [address] [place] [id number] [phone]            Sep 05, 2017  3:50 PM CDT   [facility] Extremity with TAINA Bustillo [facility] ([facility])    [address] [place] [id number] [phone]            Sep 12, 2017  1:00 PM CDT   Return Visit with Kimo Hudson LP   [facility] [place] ([facility] [place])    [address]  [place] [id number] [phone]            Sep 13, 2017  3:00 PM CDT   (Arrive by 2:45 PM)   Return Botox with Frederick Bender MD   [facility] ([facility])    [address] [place] [address]  "Floor  [facility] [id number] [phone]            Sep 19, 2017 10:30 AM CDT   Return Visit with ALISA Burt   [facility] ([facility])    [address] [place] [phone]            Sep 19, 2017 11:30 AM CDT   Return Visit with Austen Marquez MD   [facility] ([facility]    [address] [place] [phone]            Sep 27, 2017 11:00 AM CDT   Return Visit with Kimo Hudson LP   [facility] ([facility])    [address] [place] [phone]              Who to contact     If you have questions or need follow up information about today's clinic visit or your schedule please contact [facility] directly at [phone].  Normal or non-critical lab and imaging results will be communicated to you by IQuumhart, letter or phone within 4 business days after the clinic has received the results. If you do not hear from us within 7 days, please contact the clinic through IQuumhart or phone. If you have a critical or abnormal lab result, we will notify you by phone as soon as possible.  Submit refill requests through Truckily or call your pharmacy and they will forward the refill request to us. Please allow 3 business days for your refill to be completed.          Additional Information About Your Visit        Truckily Information     Truckily lets you send messages to your doctor, view your test results, renew your prescriptions, schedule appointments and more. To sign up, go to [email or web address] . Click on \"Log in\" on the left side of the screen, which will take you to the Welcome page. Then click on \"Sign up Now\" on the right side of the page.     You will be asked to enter the access code listed below, as well as some personal information. " Please follow the directions to create your username and password.     Your access code is: [email or web address]  Expires: 10/29/2017 10:26 AM     Your access code will  in 90 days. If you need help or a new code, please call your [facility] clinic or [phone].        Care EveryWhere ID     This is your Care EveryWhere ID. This could be used by other organizations to access your [facility] medical records  [id number]        Your Vitals Were     Last Period                   2017            Blood Pressure from Last 3 Encounters:   17 110/72   17 103/66   17 102/60    Weight from Last 3 Encounters:   17 65.8 kg (145 lb)   17 65.8 kg (145 lb)   17 65.8 kg (145 lb)              We Performed the Following     HC PT EVAL, LOW COMPLEXITY     [facility] INITIAL EVAL REPORT     NEUROMUSCULAR RE-EDUCATION     THERAPEUTIC EXERCISES          Today's Medication Changes          These changes are accurate as of: 17  4:22 PM.  If you have any questions, ask your nurse or doctor.               These medicines have changed or have updated prescriptions.        Dose/Directions    amitriptyline 25 MG tablet   Commonly known as:  ELAVIL   This may have changed:  how much to take   Used for:  Atypical chest pain, Insomnia, unspecified type        Dose:  25 mg   Take 1 tablet (25 mg) by mouth At Bedtime   Quantity:  45 tablet   Refills:  5                Primary Care Provider Office Phone # Fax #    Sonja nAnabella Abreu, APRN [facility] [phone]       [address] [facility] [address]  [facility] [address]        Equal Access to Services     [facility]: Hadii malcolm brady hadasho Soteddyali, waaxda luqadaha, qaybta kaalmada adeegyamariya, mike rodriguez . So [facility] [phone].    ATENCIÓN: Si habla español, tiene a livingston disposición servicios gratuitos de asistencia lingüística. Llame al [phone].    We comply with applicable federal civil rights laws and [place] laws. We do not  discriminate on the basis of race, color, national origin, age, disability sex, sexual orientation or gender identity.            Thank you!     Thank you for choosing [facility]  for your care. Our goal is always to provide you with excellent care. Hearing back from our patients is one way we can continue to improve our services. Please take a few minutes to complete the written survey that you may receive in the mail after your visit with us. Thank you!             Your Updated Medication List - Protect others around you: Learn how to safely use, store and throw away your medicines at www.disposemymeds.org.          This list is accurate as of: 8/30/17  4:22 PM.  Always use your most recent med list.                   Brand Name Dispense Instructions for use Diagnosis    albuterol 108 (90 BASE) MCG/ACT Inhaler    PROAIR HFA/PROVENTIL HFA/VENTOLIN HFA    1 Inhaler    Inhale 2 puffs into the lungs every 6 hours as needed for shortness of breath / dyspnea or wheezing    Atypical chest pain       amitriptyline 25 MG tablet    ELAVIL    45 tablet    Take 1 tablet (25 mg) by mouth At Bedtime    Atypical chest pain, Insomnia, unspecified type       apremilast 30 MG tablet    OTEZLA    60 tablet    20 mg in AM and 30mg in PM daily X 2 weeks and then 30mg twice daily.    Behcet's disease (H)       ASPIRIN CHILDRENS 81 MG chewable tablet   Generic drug:  aspirin      Take 81 mg by mouth as needed        betamethasone valerate 0.1 % cream    VALISONE     Apply topically 2 times daily        * botulinum toxin type A 100 UNITS injection    BOTOX    200 Units    Inject 200 Units into the muscle every 3 months    Cervical dystonia       * BOTOX IJ      Inject 150 Units into the muscle once Lot: /C3 Exp: 05/2019        * BOTOX IJ      Inject 150 Units into the muscle Lot # /C3  Exp: 8/2019        * BOTOX IJ      Inject 162.5 Units as directed once Lot #: /C3 Exp: 10/2019        *  ONABOTULINUMTOXINA IJ      Inject 165 Units as directed once Lot # /C3 Expiration Date: 01/2020        BusPIRone HCl 30 MG Tabs     60 tablet    Take 15 mg by mouth 2 times daily Increased dose. For anxiety.    TAHIR (generalized anxiety disorder)       carbamide peroxide 6.5 % otic solution    DEBROX     5 drops 2 times daily        clobetasol 0.05 % cream    TEMOVATE    60 g    Apply topically 2 times daily    Folliculitis       Colchicine 0.6 MG Caps     90 capsule    Take 0.6 mg by mouth See Admin Instructions 2 capsules in AM and 1 capsule in PM    Behcet's syndrome (H)       diclofenac 1 % Gel topical gel    VOLTAREN    100 g    Apply 2-4 grams to affected area(s) up to 4 times per day as needed. This is an anti-inflammatory medication.    Polyarthralgia       dicyclomine 20 MG tablet    BENTYL    60 tablet    Take 1 tablet (20 mg) by mouth 4 times daily as needed    Abdominal cramping       diphenhydrAMINE 25 MG tablet    BENADRYL    30 tablet    Take 1 tablet (25 mg) by mouth every 8 hours as needed for allergies        EPINEPHrine 0.3 MG/0.3ML injection 2-pack    EPIPEN 2-EAMON    0.6 mL    Inject 0.3 mLs (0.3 mg) into the muscle as needed for anaphylaxis    Hx of bee sting allergy       EXCEDRIN MIGRAINE PO      Take by mouth as needed        guanFACINE 1 MG tablet    TENEX    180 tablet    Take 3 tablets (3 mg) by mouth twice daily in the morning and evening daily.    Tic       hydrOXYzine 25 MG tablet    ATARAX    60 tablet    Take 1-2 tablets (25-50 mg) by mouth every 6 hours as needed for anxiety    TAHIR (generalized anxiety disorder)       hyoscyamine 0.125 MG tablet    ANASPAZ/LEVSIN    40 tablet    Take 1-2 tablets (125-250 mcg) by mouth every 4 hours as needed for cramping    Abdominal pain, generalized       hypromellose 0.3 % Soln ophthalmic solution    GENTEAL     1 drop every hour as needed        LACTAID PO      Take by mouth daily        lactulose 20 GM/30ML Soln     300 mL    Take 30 mLs by  mouth 3 times daily as needed        lidocaine 2 % topical gel    XYLOCAINE     Apply 1 Tube topically daily Reported on 4/21/2017        lidocaine visc 2% 2.5mL/5mL & maalox/mylanta w/ simeth 2.5mL/5mL & diphenhydrAMINE 5mg/5mL Susp suspension    [facility] Mouthwash [facility]    1 Bottle    Swish and swallow 10 mLs in mouth every 6 hours as needed for mouth sores    Behcet's syndrome (H)       linaclotide 290 MCG capsule    LINZESS    90 capsule    Take 1 capsule (290 mcg) by mouth every morning (before breakfast)    Irritable bowel syndrome       LORazepam 1 MG tablet    ATIVAN    90 tablet    Take 0.5 tablets (0.5 mg) by mouth 2 times daily as needed for other (abdominal pain) Do not operate a vehicle after taking this medication    Chronic abdominal pain       meclizine 25 MG tablet    ANTIVERT    30 tablet    Take 1 tablet (25 mg) by mouth every 6 hours as needed for dizziness    Nausea       metaxalone 800 MG tablet    SKELAXIN    30 tablet    Take 0.5 tablets (400 mg) by mouth 3 times daily as needed for moderate pain    Sprain of neck, initial encounter, Cervical dystonia       norgestimate-ethinyl estradiol 0.25-35 MG-MCG per tablet    ORTHO-CYCLEN, SPRINTEC    84 tablet    Take 1 tablet by mouth daily    General counseling for prescription of oral contraceptives       omeprazole 40 MG capsule    priLOSEC    90 capsule    Take 1 capsule (40 mg) by mouth daily    Gastroesophageal reflux disease, esophagitis presence not specified       ondansetron 8 MG ODT tab    ZOFRAN-ODT    60 tablet    Take 1 tablet (8 mg) by mouth every 8 hours as needed for nausea    Non-intractable vomiting with nausea, unspecified vomiting type, Hematemesis, presence of nausea not specified       order for DME     1 Device    Post op shoe    Contusion of right foot, initial encounter       promethazine 25 MG tablet    PHENERGAN    20 tablet    Take 0.5-1 tablets (12.5-25 mg) by mouth every 6 hours as needed for nausea    Intractable  chronic migraine without aura and without status migrainosus       sucralfate 1 GM/10ML suspension    CARAFATE    1200 mL    Take 10 mLs (1 g) by mouth 4 times daily    Bile reflux gastritis, Nausea       triamcinolone 0.1 % cream    KENALOG    15 g    Apply sparingly to oral ulcers three times daily for 14 days as needed.    Behcet's syndrome (H)       * Notice:  This list has 5 medication(s) that are the same as other medications prescribed for you. Read the directions carefully, and ask your doctor or other care provider to review them with you.

## 2017-08-30 NOTE — PROGRESS NOTES
Subjective:    [facility]                  [facility]  Physical Therapy Initial Examination/Evaluation  August 30, 2017    Samara Oropeza is a 21 year old  female referred to physical therapy by Dr. Weller for treatment of knee pain with Precautions/Restrictions/MD instructions none    Subjective:  Referring MD visit date: 8/1/17  DOI/onset: June 2017 patient notes worsening of R knee pain.    Mechanism of injury: Patient notes onset of patellar instability as a child.  Patient underwent MPFL-R in 2013.  She did quite well for several years.  Her sense of instability and pain worsened in October 2016.  She completed a round of physical therapy which was helpful in reducing her pain  DOS March 2013  Previous treatment: Patient has had several courses of PT for her R knee in the past; all of which have been helpful  Imaging: MRI  Chief Complaint:   Pain with stairs, getting up from sitting   Pain: rest 3-4 /10, activity 8/10 anterior Described as: sharp, throbbing Alleviated by: rest Frequency: constant Progression of symptoms since initial onset: improving Time of day when pain is worse: evening  Sleeping: affected    Occupation: PCA  Job duties:  Standing, lifting    Current HEP/exercise regimen: previous mat PT exercises  Patient's goals are reduce pain    Pertinent PMH: Behcet's, gastroparesis, RA, seizures   General Health Reported by Patient: Fair  Return to MD:  PRN      Lower Extremity Exam    Dynamic Movement Screen: - weight bearing alignment not assessed today as patient had crush injury on her R foot, and L ankle sprain    Gait: Antalgic, boot on R foot, one crutch    Patellofemoral Joint Examination:  Test Right Left   Patellar Orientation:   90 deg flexion neutral neutral   OKC Patellar Tracking Crepitus Crepitus   Patellar Orientation:  0 deg flexion neutral neutral   Quadrant Mobility (Med:Lat) 2:2  2:2   Effusion 0 0   Quad Activation Normal  Normal     Knee Joint AROM   Right Left Difference   Hyperextension 5 deg 5 deg 0 deg   Extension 0 deg 0 deg 0 deg   Flexion 135 deg 135 deg 0 deg     Palpation:   Tender to palpation at the following structures: medial patellar border and lateral patellar border    Hip Joint PROM Screen   Right Left Difference   Flex 130 deg 130 deg 0 deg   ER 50 deg 50 deg 0 deg   IR 30 deg 30 deg 0 deg     Lower Extremity Muscle Strength (x/5)   Right Left   Hip ER 4+/5 4+/5   Hip IR 4+/5 4+/5   Hip ABD 5-/5 5-/5   Hip Ext 5/5 5/5   Knee Flex 4+/5 4+/5       Foot Screen:   Not assessed    Taping trial -  Significant reduction in pain with ambulation with Adamson taping technique medial glide and fat pad unloading.       Objective:    System    Physical Exam    General     ROS    Assessment/Plan:      Patient is a 21 year old female with left side knee complaints.    Patient has the following significant findings with corresponding treatment plan.                Diagnosis 1:  S/p MPFL-R    Pain -  hot/cold therapy, manual therapy, splint/taping/bracing/orthotics, self management, education and home program  Decreased strength - therapeutic exercise and therapeutic activities  Impaired balance - neuro re-education and therapeutic activities  Decreased proprioception - neuro re-education and therapeutic activities  Impaired gait - gait training  Impaired muscle performance - neuro re-education  Decreased function - therapeutic activities    Therapy Evaluation Codes:   1) History comprised of:   Personal factors that impact the plan of care:      Time since onset of symptoms.    Comorbidity factors that impact the plan of care are:      Pain at night/rest, Rheumatoid arthritis and Seizures.     Medications impacting care: see epic.  2) Examination of Body Systems comprised of:   Body structures and functions that impact the plan of care:      Ankle and Knee.   Activity limitations that impact the plan of care are:       Squatting/kneeling, Stairs, Standing, Walking and Sleeping.  3) Clinical presentation characteristics are:   Stable/Uncomplicated.  4) Decision-Making    Low complexity using standardized patient assessment instrument and/or measureable assessment of functional outcome.  Cumulative Therapy Evaluation is: Low complexity.    Previous and current functional limitations:  (See Goal Flow Sheet for this information)    Short term and Long term goals: (See Goal Flow Sheet for this information)     Communication ability:  Patient appears to be able to clearly communicate and understand verbal and written communication and follow directions correctly.  Treatment Explanation - The following has been discussed with the patient:   RX ordered/plan of care  Anticipated outcomes  Possible risks and side effects  This patient would benefit from PT intervention to resume normal activities.   Rehab potential is good.    Frequency:  1 X week, once daily  Duration:  for 2 weeks tapering to 2 X a month over 8 weeks  Discharge Plan:  Achieve all LTG.  Independent in home treatment program.  Reach maximal therapeutic benefit.    Please refer to the daily flowsheet for treatment today, total treatment time and time spent performing 1:1 timed codes.

## 2017-08-31 ENCOUNTER — OFFICE VISIT (OUTPATIENT)
Dept: PALLIATIVE MEDICINE | Facility: CLINIC | Age: 22
End: 2017-08-31
Payer: COMMERCIAL

## 2017-08-31 VITALS
WEIGHT: 146 LBS | HEART RATE: 72 BPM | RESPIRATION RATE: 16 BRPM | BODY MASS INDEX: 25.86 KG/M2 | SYSTOLIC BLOOD PRESSURE: 94 MMHG | DIASTOLIC BLOOD PRESSURE: 62 MMHG | OXYGEN SATURATION: 95 %

## 2017-08-31 DIAGNOSIS — G89.4 CHRONIC PAIN SYNDROME: Primary | ICD-10-CM

## 2017-08-31 PROCEDURE — 96152 HC HEALTH AND BEHAVIOR INTERVENTION, INDIVIDUAL, EACH 15 MINUTES: CPT | Performed by: PSYCHOLOGIST

## 2017-08-31 PROCEDURE — 99215 OFFICE O/P EST HI 40 MIN: CPT | Performed by: NURSE PRACTITIONER

## 2017-08-31 PROCEDURE — 97530 THERAPEUTIC ACTIVITIES: CPT | Mod: GP | Performed by: PHYSICAL THERAPIST

## 2017-08-31 ASSESSMENT — PAIN SCALES - GENERAL: PAINLEVEL: SEVERE PAIN (6)

## 2017-08-31 NOTE — MR AVS SNAPSHOT
After Visit Summary   8/31/2017    Samara Oropeza    MRN: [id number]           Patient Information     Date Of Birth          1995        Visit Information        Provider Department      8/31/2017 9:00 AM Dione Blue APRN CNP [facility]        Care Instructions    After Visit Instructions:     Thank you for coming to [facility] for your care. It is my goal to partner with you to help you reach your optimal state of health.     I am recommending multidisciplinary care at this time.  The focus of care will be to continue gradual rehabilitation and pain management with medication adjustments as needed.    Continue daily self-care, identifying contributing factors, and monitoring variations in pain level. Continue to integrate self-care into your life.      1. Schedule pain psychology assessment with Dr Joanie Reynaga. Contact information given  2. Schedule physical therapy visits with Emily  3. Schedule follow-up with EVI Lobato NP-C in 4 weeks  4. Medication recommendations: No changes at this time       EVI Miller, NP-C  [facility]    Contact information: [facility]    Please call if any side effects, questions, or concerns arise.    Nurse Triage line:  [phone]   Call this number with any questions or concerns. You may leave a detailed message anytime. Calls are typically returned Monday through Friday between 8 AM and 4:30 PM. We usually get back to you within 2 business days depending on the issue/request.    Scheduling number: [phone].  Call this number to schedule or change appointments.          Medication Refills Policy:    For non-narcotic medications, please your pharmacy directly to request a refill and the pharmacy will call the [facility] [place] for authorization. Please allow 3-4 days for these refills.    For narcotic refills, call  the nurse triage line and leave a message requesting your refill along with the name of the pharmacy that you use. Narcotic prescriptions will be mailed to your pharmacy or you may pick them up at the clinic.  Please call 7-10 days before your refill is due  The above policy allows adequate time so that you do not run out of medication.    No Show - Late Cancellation - Late Arrival Policy  We believe regular attendance is key to your success in our program.    Any time you are unable to keep your appointment we ask that you call us at  least 24 hours in advance to let us know. This will allow us to offer the appointment time to another patient. The following is our policy for missed appointments. This also applies to appointments cancelled with less than 24 hours notice.    You will receive a letter after missing your 1st and 2nd appointments without contacting the clinic before your scheduled appointment time.     After missing 3 appointments without calling first, we will cancel all of your future appointments at [facility].    At that point, you will not be able to resume services unless approved by your care team  We understand that unforseen circumstances arise, however, out of respect for all concerned and to provide this appointment to another patient, this policy will be enforced.    Please note that most follow up appointments are 30 minutes long. If you arrive late, your provider may not be able to see you for the entire 30 minutes. Please also note that if you arrive more than 15 minutes for any appointment, it may be rescheduled.                                     Follow-ups after your visit        Your next 10 appointments already scheduled     Aug 31, 2017 10:00 AM CDT   Return Visit with Emily Rollins PT   [facility] ([facility])    [address]  [facility] [address]   [phone]            Aug 31, 2017  1:00 PM CDT   Return  Visit with Kimo Hudson LP   [facility] [place] ([facility] [place])    [address]  [place] [address]   [phone]            Sep 05, 2017 12:30 PM CDT   Return Visit with ALISA Burt   [facility]    [address] [id number] [phone]            Sep 05, 2017  3:50 PM CDT   LUIZ Extremity with Jennifer Alvarenga PT   [facility] ([facility])    [address]th [address]  [facility] [address] [phone]            Sep 12, 2017  1:00 PM CDT   Return Visit with Kimo Hudson LP   [facility] [place] ([facility] [place])    [address]  [place] [address] [phone]            Sep 13, 2017  3:00 PM CDT   (Arrive by 2:45 PM)   Return Botox with Frederick Bender MD   [facility] ([facility])    [address]  3rd Floor  [facility] [address] [phone]            Sep 19, 2017 10:30 AM CDT   Return Visit with ALISA Burt   [facility] ([facility])    [address] [id number] [phone]            Sep 19, 2017 11:30 AM CDT   Return Visit with Austen Marquez MD   [facility] ([facility])    [address] [address] [phone]            Sep 27, 2017 11:00 AM CDT   Return Visit with Kimo Hudson LP   [facility] [place] ([facility] [place])    [address]  [place] [address]   [phone]            Oct 03, 2017  2:30 PM CDT   Return Visit with Layla Browne LICSW   [facility] ([facility])    [address]  "[facility] [address] [phone]              Who to contact     If you have questions or need follow up information about today's clinic visit or your schedule please contact [facility] directly at [phone].  Normal or non-critical lab and imaging results will be communicated to you by MyChart, letter or phone within 4 business days after the clinic has received the results. If you do not hear from us within 7 days, please contact the clinic through MyChart or phone. If you have a critical or abnormal lab result, we will notify you by phone as soon as possible.  Submit refill requests through [employer] or call your pharmacy and they will forward the refill request to us. Please allow 3 business days for your refill to be completed.          Additional Information About Your Visit        MyCharScreen Information     [employer] lets you send messages to your doctor, view your test results, renew your prescriptions, schedule appointments and more. To sign up, go to [email or web address] . Click on \"Log in\" on the left side of the screen, which will take you to the Welcome page. Then click on \"Sign up Now\" on the right side of the page.     You will be asked to enter the access code listed below, as well as some personal information. Please follow the directions to create your username and password.     Your access code is: [email or web address]  Expires: 10/29/2017 10:26 AM     Your access code will  in 90 days. If you need help or a new code, please call your [place] clinic or [phone].        Care EveryWhere ID     This is your Care EveryWhere ID. This could be used by other organizations to access your [place] medical records  [id number]        Your Vitals Were     Pulse Respirations Last Period Pulse Oximetry BMI (Body Mass Index)       72 16 2017 95% 25.86 kg/m2        Blood Pressure from Last 3 Encounters:   17 94/62   17 110/72   17 103/66    Weight " from Last 3 Encounters:   08/31/17 66.2 kg (146 lb)   08/22/17 65.8 kg (145 lb)   08/20/17 65.8 kg (145 lb)              Today, you had the following     No orders found for display         Today's Medication Changes          These changes are accurate as of: 8/31/17  9:57 AM.  If you have any questions, ask your nurse or doctor.               These medicines have changed or have updated prescriptions.        Dose/Directions    amitriptyline 25 MG tablet   Commonly known as:  ELAVIL   This may have changed:  how much to take   Used for:  Atypical chest pain, Insomnia, unspecified type        Dose:  25 mg   Take 1 tablet (25 mg) by mouth At Bedtime   Quantity:  45 tablet   Refills:  5                Primary Care Provider Office Phone # Fax #    SonjaEVI Huynh [facility] [phone]       [address] [facility] [address]  [facility] [address]        Equal Access to Services     NABIL GALEANO : Hadii malcolm brady hadasho Sotiffany, waaxda luqadaha, qaybta kaalmada adeegyada, waxay verónicain hayarlet rodriguez . So [facility] [phone].    ATENCIÓN: Si habla español, tiene a livingston disposición servicios gratuitos de asistencia lingüística. Michaeljulio al [phone].    We comply with applicable federal civil rights laws and [place] laws. We do not discriminate on the basis of race, color, national origin, age, disability sex, sexual orientation or gender identity.            Thank you!     Thank you for choosing [facility]  for your care. Our goal is always to provide you with excellent care. Hearing back from our patients is one way we can continue to improve our services. Please take a few minutes to complete the written survey that you may receive in the mail after your visit with us. Thank you!             Your Updated Medication List - Protect others around you: Learn how to safely use, store and throw away your medicines at www.disposemymeds.org.          This list is accurate as of: 8/31/17  9:57  AM.  Always use your most recent med list.                   Brand Name Dispense Instructions for use Diagnosis    albuterol 108 (90 BASE) MCG/ACT Inhaler    PROAIR HFA/PROVENTIL HFA/VENTOLIN HFA    1 Inhaler    Inhale 2 puffs into the lungs every 6 hours as needed for shortness of breath / dyspnea or wheezing    Atypical chest pain       amitriptyline 25 MG tablet    ELAVIL    45 tablet    Take 1 tablet (25 mg) by mouth At Bedtime    Atypical chest pain, Insomnia, unspecified type       apremilast 30 MG tablet    OTEZLA    60 tablet    20 mg in AM and 30mg in PM daily X 2 weeks and then 30mg twice daily.    Behcet's disease (H)       ASPIRIN CHILDRENS 81 MG chewable tablet   Generic drug:  aspirin      Take 81 mg by mouth as needed        betamethasone valerate 0.1 % cream    VALISONE     Apply topically 2 times daily        * botulinum toxin type A 100 UNITS injection    BOTOX    200 Units    Inject 200 Units into the muscle every 3 months    Cervical dystonia       * BOTOX IJ      Inject 150 Units into the muscle once Lot: /C3 Exp: 05/2019        * BOTOX IJ      Inject 150 Units into the muscle Lot # /C3  Exp: 8/2019        * BOTOX IJ      Inject 162.5 Units as directed once Lot #: /C3 Exp: 10/2019        * ONABOTULINUMTOXINA IJ      Inject 165 Units as directed once Lot # /C3 Expiration Date: 01/2020        BusPIRone HCl 30 MG Tabs     60 tablet    Take 15 mg by mouth 2 times daily Increased dose. For anxiety.    TAHIR (generalized anxiety disorder)       carbamide peroxide 6.5 % otic solution    DEBROX     5 drops 2 times daily        clobetasol 0.05 % cream    TEMOVATE    60 g    Apply topically 2 times daily    Folliculitis       Colchicine 0.6 MG Caps     90 capsule    Take 0.6 mg by mouth See Admin Instructions 2 capsules in AM and 1 capsule in PM    Behcet's syndrome (H)       diclofenac 1 % Gel topical gel    VOLTAREN    100 g    Apply 2-4 grams to affected area(s) up to 4 times per day  as needed. This is an anti-inflammatory medication.    Polyarthralgia       dicyclomine 20 MG tablet    BENTYL    60 tablet    Take 1 tablet (20 mg) by mouth 4 times daily as needed    Abdominal cramping       diphenhydrAMINE 25 MG tablet    BENADRYL    30 tablet    Take 1 tablet (25 mg) by mouth every 8 hours as needed for allergies        EPINEPHrine 0.3 MG/0.3ML injection 2-pack    EPIPEN 2-EAMON    0.6 mL    Inject 0.3 mLs (0.3 mg) into the muscle as needed for anaphylaxis    Hx of bee sting allergy       EXCEDRIN MIGRAINE PO      Take by mouth as needed        guanFACINE 1 MG tablet    TENEX    180 tablet    Take 3 tablets (3 mg) by mouth twice daily in the morning and evening daily.    Tic       hydrOXYzine 25 MG tablet    ATARAX    60 tablet    Take 1-2 tablets (25-50 mg) by mouth every 6 hours as needed for anxiety    TAHIR (generalized anxiety disorder)       hyoscyamine 0.125 MG tablet    ANASPAZ/LEVSIN    40 tablet    Take 1-2 tablets (125-250 mcg) by mouth every 4 hours as needed for cramping    Abdominal pain, generalized       hypromellose 0.3 % Soln ophthalmic solution    GENTEAL     1 drop every hour as needed        LACTAID PO      Take by mouth daily        lactulose 20 GM/30ML Soln     300 mL    Take 30 mLs by mouth 3 times daily as needed        lidocaine 2 % topical gel    XYLOCAINE     Apply 1 Tube topically daily Reported on 4/21/2017        lidocaine visc 2% 2.5mL/5mL & maalox/mylanta w/ simeth 2.5mL/5mL & diphenhydrAMINE 5mg/5mL Susp suspension    [facility]    1 Bottle    Swish and swallow 10 mLs in mouth every 6 hours as needed for mouth sores    Behcet's syndrome (H)       linaclotide 290 MCG capsule    LINZESS    90 capsule    Take 1 capsule (290 mcg) by mouth every morning (before breakfast)    Irritable bowel syndrome       LORazepam 1 MG tablet    ATIVAN    90 tablet    Take 0.5 tablets (0.5 mg) by mouth 2 times daily as needed for other (abdominal pain) Do not operate a  vehicle after taking this medication    Chronic abdominal pain       meclizine 25 MG tablet    ANTIVERT    30 tablet    Take 1 tablet (25 mg) by mouth every 6 hours as needed for dizziness    Nausea       metaxalone 800 MG tablet    SKELAXIN    30 tablet    Take 0.5 tablets (400 mg) by mouth 3 times daily as needed for moderate pain    Sprain of neck, initial encounter, Cervical dystonia       norgestimate-ethinyl estradiol 0.25-35 MG-MCG per tablet    ORTHO-CYCLEN, SPRINTEC    84 tablet    Take 1 tablet by mouth daily    General counseling for prescription of oral contraceptives       omeprazole 40 MG capsule    priLOSEC    90 capsule    Take 1 capsule (40 mg) by mouth daily    Gastroesophageal reflux disease, esophagitis presence not specified       ondansetron 8 MG ODT tab    ZOFRAN-ODT    60 tablet    Take 1 tablet (8 mg) by mouth every 8 hours as needed for nausea    Non-intractable vomiting with nausea, unspecified vomiting type, Hematemesis, presence of nausea not specified       order for DME     1 Device    Post op shoe    Contusion of right foot, initial encounter       promethazine 25 MG tablet    PHENERGAN    20 tablet    Take 0.5-1 tablets (12.5-25 mg) by mouth every 6 hours as needed for nausea    Intractable chronic migraine without aura and without status migrainosus       sucralfate 1 GM/10ML suspension    CARAFATE    1200 mL    Take 10 mLs (1 g) by mouth 4 times daily    Bile reflux gastritis, Nausea       triamcinolone 0.1 % cream    KENALOG    15 g    Apply sparingly to oral ulcers three times daily for 14 days as needed.    Behcet's syndrome (H)       * Notice:  This list has 5 medication(s) that are the same as other medications prescribed for you. Read the directions carefully, and ask your doctor or other care provider to review them with you.

## 2017-08-31 NOTE — MR AVS SNAPSHOT
After Visit Summary   8/31/2017    Samara Oropeza    MRN: [id number]           Patient Information     Date Of Birth          1995        Visit Information        Provider Department      8/31/2017 10:00 AM Emily Rollins, TAINA [facility]        Today's Diagnoses     Chronic pain syndrome    -  1       Follow-ups after your visit        Your next 10 appointments already scheduled     Sep 05, 2017 12:30 PM CDT   Return Visit with ALISA Burt   [facility] ([facility])    [address] [phone] [phone]            Sep 05, 2017  3:50 PM CDT   LUIZ Extremity with Jennifer Alvarenga PT   [facility] ([facility])    [address] [place] [address] [address] [facility] [id number] [phone]            Sep 12, 2017  9:15 AM CDT   Return Visit with Emily Rollins PT   [facility] ([facility])    [address] [place] [address] [phone]            Sep 12, 2017  1:00 PM CDT   Return Visit with Kimo Hudson LP   [facility] [place] ([facility])    [address] [place] [id number] [phone]            Sep 13, 2017  3:00 PM CDT   (Arrive by 2:45 PM)   Return Botox with Frederick Bender MD   [facility] ([facility])    [address] [place] [id number] [phone]            Sep 19, 2017 10:30 AM CDT   Return Visit with ALISA Burt   [facility] ([facility])    [address] [phone]            Sep 19, 2017 11:30 AM CDT   Return Visit with Austen Marquez MD   [facility] ([facility]  "[facility]    [address] [address]   [phone]            Sep 26, 2017  3:15 PM CDT   Return Visit with Emily Rollins PT   [facility] ([facility])    [address]  [facility] [phone]            Sep 27, 2017  8:30 AM CDT   Return Visit with EVI Dahl CNP   [facility] ([facility])    [address]  [facility] [address]  [facility] [phone]            Sep 27, 2017 11:00 AM CDT   Return Visit with Kimo Hudson LP   [facility] [place] ([facility] [place])    [address] [facility]  [place] [phone]              Who to contact     If you have questions or need follow up information about today's clinic visit or your schedule please contact [facility] directly at [phone].  Normal or non-critical lab and imaging results will be communicated to you by [employer], letter or phone within 4 business days after the clinic has received the results. If you do not hear from us within 7 days, please contact the clinic through [employer] or phone. If you have a critical or abnormal lab result, we will notify you by phone as soon as possible.  Submit refill requests through X[employer]box or call your pharmacy and they will forward the refill request to us. Please allow 3 business days for your refill to be completed.          Additional Information About Your Visit        [employer] Information     Xmybox lets you send messages to your doctor, view your test results, renew your prescriptions, schedule appointments and more. To sign up, go to [email or web address] . Click on \"Log in\" on the left side of the screen, which will take you to the Welcome page. Then click on \"Sign up Now\" on the right side of the page.     You will be asked to enter the access code listed below, as well as some personal " information. Please follow the directions to create your username and password.     Your access code is: [email or web address]  Expires: 10/29/2017 10:26 AM     Your access code will  in 90 days. If you need help or a new code, please call your [facility] clinic or [phone].        Care EveryWhere ID     This is your Care EveryWhere ID. This could be used by other organizations to access your [facility] medical records  [id number]        Your Vitals Were     Last Period                   2017            Blood Pressure from Last 3 Encounters:   17 94/62   17 110/72   17 103/66    Weight from Last 3 Encounters:   17 66.2 kg (146 lb)   17 65.8 kg (145 lb)   17 65.8 kg (145 lb)              We Performed the Following     THERAPEUTIC ACTIVITIES          Today's Medication Changes          These changes are accurate as of: 17 11:59 PM.  If you have any questions, ask your nurse or doctor.               These medicines have changed or have updated prescriptions.        Dose/Directions    amitriptyline 25 MG tablet   Commonly known as:  ELAVIL   This may have changed:  how much to take   Used for:  Atypical chest pain, Insomnia, unspecified type        Dose:  25 mg   Take 1 tablet (25 mg) by mouth At Bedtime   Quantity:  45 tablet   Refills:  5                Primary Care Provider Office Phone # Fax #    Sonja FinleyEVI Leal [facility] [phone]       [address]  [facility] [address]        Equal Access to Services     FRANK GALEANO AH: Hadii aad ku hadasho Soomaali, waaxda luqadaha, qaybta kaalmada adeegyada, mike rodriguez . So [facility] [phone].    ATENCIÓN: Si habla español, tiene a livingston disposición servicios gratuitos de asistencia lingüística. Llame al [phone].    We comply with applicable federal civil rights laws and [place] laws. We do not discriminate on the basis of race, color, national origin, age, disability sex,  sexual orientation or gender identity.            Thank you!     Thank you for choosing [facility]  for your care. Our goal is always to provide you with excellent care. Hearing back from our patients is one way we can continue to improve our services. Please take a few minutes to complete the written survey that you may receive in the mail after your visit with us. Thank you!             Your Updated Medication List - Protect others around you: Learn how to safely use, store and throw away your medicines at www.disposemymeds.org.          This list is accurate as of: 8/31/17 11:59 PM.  Always use your most recent med list.                   Brand Name Dispense Instructions for use Diagnosis    albuterol 108 (90 BASE) MCG/ACT Inhaler    PROAIR HFA/PROVENTIL HFA/VENTOLIN HFA    1 Inhaler    Inhale 2 puffs into the lungs every 6 hours as needed for shortness of breath / dyspnea or wheezing    Atypical chest pain       amitriptyline 25 MG tablet    ELAVIL    45 tablet    Take 1 tablet (25 mg) by mouth At Bedtime    Atypical chest pain, Insomnia, unspecified type       apremilast 30 MG tablet    OTEZLA    60 tablet    20 mg in AM and 30mg in PM daily X 2 weeks and then 30mg twice daily.    Behcet's disease (H)       ASPIRIN CHILDRENS 81 MG chewable tablet   Generic drug:  aspirin      Take 81 mg by mouth as needed        betamethasone valerate 0.1 % cream    VALISONE     Apply topically 2 times daily        * botulinum toxin type A 100 UNITS injection    BOTOX    200 Units    Inject 200 Units into the muscle every 3 months    Cervical dystonia       * BOTOX IJ      Inject 150 Units into the muscle once Lot: /C3 Exp: 05/2019        * BOTOX IJ      Inject 150 Units into the muscle Lot # /C3  Exp: 8/2019        * BOTOX IJ      Inject 162.5 Units as directed once Lot #: /C3 Exp: 10/2019        * ONABOTULINUMTOXINA IJ      Inject 165 Units as directed once Lot # /C3 Expiration Date:  01/2020        BusPIRone HCl 30 MG Tabs     60 tablet    Take 15 mg by mouth 2 times daily Increased dose. For anxiety.    TAHIR (generalized anxiety disorder)       carbamide peroxide 6.5 % otic solution    DEBROX     5 drops 2 times daily        clobetasol 0.05 % cream    TEMOVATE    60 g    Apply topically 2 times daily    Folliculitis       Colchicine 0.6 MG Caps     90 capsule    Take 0.6 mg by mouth See Admin Instructions 2 capsules in AM and 1 capsule in PM    Behcet's syndrome (H)       diclofenac 1 % Gel topical gel    VOLTAREN    100 g    Apply 2-4 grams to affected area(s) up to 4 times per day as needed. This is an anti-inflammatory medication.    Polyarthralgia       dicyclomine 20 MG tablet    BENTYL    60 tablet    Take 1 tablet (20 mg) by mouth 4 times daily as needed    Abdominal cramping       diphenhydrAMINE 25 MG tablet    BENADRYL    30 tablet    Take 1 tablet (25 mg) by mouth every 8 hours as needed for allergies        EPINEPHrine 0.3 MG/0.3ML injection 2-pack    EPIPEN 2-EAMON    0.6 mL    Inject 0.3 mLs (0.3 mg) into the muscle as needed for anaphylaxis    Hx of bee sting allergy       EXCEDRIN MIGRAINE PO      Take by mouth as needed        guanFACINE 1 MG tablet    TENEX    180 tablet    Take 3 tablets (3 mg) by mouth twice daily in the morning and evening daily.    Tic       hydrOXYzine 25 MG tablet    ATARAX    60 tablet    Take 1-2 tablets (25-50 mg) by mouth every 6 hours as needed for anxiety    TAHIR (generalized anxiety disorder)       hyoscyamine 0.125 MG tablet    ANASPAZ/LEVSIN    40 tablet    Take 1-2 tablets (125-250 mcg) by mouth every 4 hours as needed for cramping    Abdominal pain, generalized       hypromellose 0.3 % Soln ophthalmic solution    GENTEAL     1 drop every hour as needed        LACTAID PO      Take by mouth daily        lactulose 20 GM/30ML Soln     300 mL    Take 30 mLs by mouth 3 times daily as needed        lidocaine 2 % topical gel    XYLOCAINE     Apply 1 Tube  topically daily Reported on 4/21/2017        lidocaine visc 2% 2.5mL/5mL & maalox/mylanta w/ simeth 2.5mL/5mL & diphenhydrAMINE 5mg/5mL Susp suspension    [facility] Mouthwash [facility]    1 Bottle    Swish and swallow 10 mLs in mouth every 6 hours as needed for mouth sores    Behcet's syndrome (H)       linaclotide 290 MCG capsule    LINZESS    90 capsule    Take 1 capsule (290 mcg) by mouth every morning (before breakfast)    Irritable bowel syndrome       LORazepam 1 MG tablet    ATIVAN    90 tablet    Take 0.5 tablets (0.5 mg) by mouth 2 times daily as needed for other (abdominal pain) Do not operate a vehicle after taking this medication    Chronic abdominal pain       meclizine 25 MG tablet    ANTIVERT    30 tablet    Take 1 tablet (25 mg) by mouth every 6 hours as needed for dizziness    Nausea       metaxalone 800 MG tablet    SKELAXIN    30 tablet    Take 0.5 tablets (400 mg) by mouth 3 times daily as needed for moderate pain    Sprain of neck, initial encounter, Cervical dystonia       norgestimate-ethinyl estradiol 0.25-35 MG-MCG per tablet    ORTHO-CYCLEN, SPRINTEC    84 tablet    Take 1 tablet by mouth daily    General counseling for prescription of oral contraceptives       omeprazole 40 MG capsule    priLOSEC    90 capsule    Take 1 capsule (40 mg) by mouth daily    Gastroesophageal reflux disease, esophagitis presence not specified       ondansetron 8 MG ODT tab    ZOFRAN-ODT    60 tablet    Take 1 tablet (8 mg) by mouth every 8 hours as needed for nausea    Non-intractable vomiting with nausea, unspecified vomiting type, Hematemesis, presence of nausea not specified       order for DME     1 Device    Post op shoe    Contusion of right foot, initial encounter       promethazine 25 MG tablet    PHENERGAN    20 tablet    Take 0.5-1 tablets (12.5-25 mg) by mouth every 6 hours as needed for nausea    Intractable chronic migraine without aura and without status migrainosus       sucralfate 1 GM/10ML suspension     CARAFATE    1200 mL    Take 10 mLs (1 g) by mouth 4 times daily    Bile reflux gastritis, Nausea       triamcinolone 0.1 % cream    KENALOG    15 g    Apply sparingly to oral ulcers three times daily for 14 days as needed.    Behcet's syndrome (H)       * Notice:  This list has 5 medication(s) that are the same as other medications prescribed for you. Read the directions carefully, and ask your doctor or other care provider to review them with you.

## 2017-08-31 NOTE — MR AVS SNAPSHOT
After Visit Summary   8/31/2017    Samara Oropeza    MRN: [id number]           Patient Information     Date Of Birth          1995        Visit Information        Provider Department      8/31/2017 1:00 PM Kimo Hudson LP [facility] [place]        Today's Diagnoses     Chronic pain syndrome    -  1       Follow-ups after your visit        Your next 10 appointments already scheduled     Sep 05, 2017 12:30 PM CDT   Return Visit with ALISA Burt   [facility] ([facility])    [address] [id number] [phone]            Sep 05, 2017  3:50 PM CDT   [facility]UI[facility] Extremity with Jennifer Alvarenga PT   [facility] ([facility])    [address] [place] [address] [address] [facility] [id number] [phone]            Sep 12, 2017  9:15 AM CDT   Return Visit with Emily Rollins PT   [facility] ([facility])    [address] [place] [id number] [phone]            Sep 12, 2017  1:00 PM CDT   Return Visit with Kimo Hudson LP   [facility] [place] ([facility] [place])    [id number] [place] [id number] [phone]            Sep 13, 2017  3:00 PM CDT   (Arrive by 2:45 PM)   Return Botox with Frederick Bender MD   [facility] ([facility])    [address] [place] [id number] [phone]            Sep 19, 2017 10:30 AM CDT   Return Visit with ALISA Burt   [facility] ([facility])    [address] [phone] [phone]            Sep 19, 2017 11:30 AM CDT   Return Visit with Austen Marquez MD   [facility] ([facility]  "[place])    [address] [address]   [phone]            Sep 26, 2017  3:15 PM CDT   Return Visit with Emily Rollins PT   [facility] ([facility])    [address]  [address] [phone] [phone]            Sep 27, 2017  8:30 AM CDT   Return Visit with EVI Dahl CNP   [facility] ([facility])    [address]  [address] [phone] [phone]            Sep 27, 2017 11:00 AM CDT   Return Visit with Kimo Hudson LP   [facility] [place] ([facility] [place])    [address]  [place] [address] [phone]              Who to contact     If you have questions or need follow up information about today's clinic visit or your schedule please contact [facility] directly at [phone].  Normal or non-critical lab and imaging results will be communicated to you by [employer], letter or phone within 4 business days after the clinic has received the results. If you do not hear from us within 7 days, please contact the clinic through [employer] or phone. If you have a critical or abnormal lab result, we will notify you by phone as soon as possible.  Submit refill requests through [employer] or call your pharmacy and they will forward the refill request to us. Please allow 3 business days for your refill to be completed.          Additional Information About Your Visit        [employer] Information     [employer] lets you send messages to your doctor, view your test results, renew your prescriptions, schedule appointments and more. To sign up, go to [email or web address] . Click on \"Log in\" on the left side of the screen, which will take you to the Welcome page. Then click on \"Sign up Now\" on the right side of the page.     You will be asked to enter the access code listed below, as well as some personal information. Please follow the " directions to create your username and password.     Your access code is: [email or web address]  Expires: 10/29/2017 10:26 AM     Your access code will  in 90 days. If you need help or a new code, please call your [facility] clinic or [phone].        Care EveryWhere ID     This is your Care EveryWhere ID. This could be used by other organizations to access your [facility] medical records  [id number]        Your Vitals Were     Last Period                   2017            Blood Pressure from Last 3 Encounters:   17 94/62   17 110/72   17 103/66    Weight from Last 3 Encounters:   17 66.2 kg (146 lb)   17 65.8 kg (145 lb)   17 65.8 kg (145 lb)              We Performed the Following     HEALTH & BEHAVIOR ASSESS, INITIAL, EA 15MIN PHD          Today's Medication Changes          These changes are accurate as of: 17 11:59 PM.  If you have any questions, ask your nurse or doctor.               These medicines have changed or have updated prescriptions.        Dose/Directions    amitriptyline 25 MG tablet   Commonly known as:  ELAVIL   This may have changed:  how much to take   Used for:  Atypical chest pain, Insomnia, unspecified type        Dose:  25 mg   Take 1 tablet (25 mg) by mouth At Bedtime   Quantity:  45 tablet   Refills:  5                Primary Care Provider Office Phone # Fax #    Sonja EVI Laguerre [facility] [phone]       [address] [facility] [address]  [place] [id number]        Equal Access to Services     FRANK GALEANO AH: Hadii aad ku hadasho Soomaali, waaxda luqadaha, qaybta kaalmada adeegyada, waxay brad rodriguez . So [facility] [phone].    ATENCIÓN: Si habla español, tiene a livingston disposición servicios gratuitos de asistencia lingüística. Llame al [phone].    We comply with applicable federal civil rights laws and [place] laws. We do not discriminate on the basis of race, color, national origin, age, disability sex, sexual  orientation or gender identity.            Thank you!     Thank you for choosing [facility]  for your care. Our goal is always to provide you with excellent care. Hearing back from our patients is one way we can continue to improve our services. Please take a few minutes to complete the written survey that you may receive in the mail after your visit with us. Thank you!             Your Updated Medication List - Protect others around you: Learn how to safely use, store and throw away your medicines at www.disposemymeds.org.          This list is accurate as of: 8/31/17 11:59 PM.  Always use your most recent med list.                   Brand Name Dispense Instructions for use Diagnosis    albuterol 108 (90 BASE) MCG/ACT Inhaler    PROAIR HFA/PROVENTIL HFA/VENTOLIN HFA    1 Inhaler    Inhale 2 puffs into the lungs every 6 hours as needed for shortness of breath / dyspnea or wheezing    Atypical chest pain       amitriptyline 25 MG tablet    ELAVIL    45 tablet    Take 1 tablet (25 mg) by mouth At Bedtime    Atypical chest pain, Insomnia, unspecified type       apremilast 30 MG tablet    OTEZLA    60 tablet    20 mg in AM and 30mg in PM daily X 2 weeks and then 30mg twice daily.    Behcet's disease (H)       ASPIRIN CHILDRENS 81 MG chewable tablet   Generic drug:  aspirin      Take 81 mg by mouth as needed        betamethasone valerate 0.1 % cream    VALISONE     Apply topically 2 times daily        * botulinum toxin type A 100 UNITS injection    BOTOX    200 Units    Inject 200 Units into the muscle every 3 months    Cervical dystonia       * BOTOX IJ      Inject 150 Units into the muscle once Lot: /C3 Exp: 05/2019        * BOTOX IJ      Inject 150 Units into the muscle Lot # /C3  Exp: 8/2019        * BOTOX IJ      Inject 162.5 Units as directed once Lot #: /C3 Exp: 10/2019        * ONABOTULINUMTOXINA IJ      Inject 165 Units as directed once Lot # /C3 Expiration Date: 01/2020         BusPIRone HCl 30 MG Tabs     60 tablet    Take 15 mg by mouth 2 times daily Increased dose. For anxiety.    TAHIR (generalized anxiety disorder)       carbamide peroxide 6.5 % otic solution    DEBROX     5 drops 2 times daily        clobetasol 0.05 % cream    TEMOVATE    60 g    Apply topically 2 times daily    Folliculitis       Colchicine 0.6 MG Caps     90 capsule    Take 0.6 mg by mouth See Admin Instructions 2 capsules in AM and 1 capsule in PM    Behcet's syndrome (H)       diclofenac 1 % Gel topical gel    VOLTAREN    100 g    Apply 2-4 grams to affected area(s) up to 4 times per day as needed. This is an anti-inflammatory medication.    Polyarthralgia       dicyclomine 20 MG tablet    BENTYL    60 tablet    Take 1 tablet (20 mg) by mouth 4 times daily as needed    Abdominal cramping       diphenhydrAMINE 25 MG tablet    BENADRYL    30 tablet    Take 1 tablet (25 mg) by mouth every 8 hours as needed for allergies        EPINEPHrine 0.3 MG/0.3ML injection 2-pack    EPIPEN 2-EAMON    0.6 mL    Inject 0.3 mLs (0.3 mg) into the muscle as needed for anaphylaxis    Hx of bee sting allergy       EXCEDRIN MIGRAINE PO      Take by mouth as needed        guanFACINE 1 MG tablet    TENEX    180 tablet    Take 3 tablets (3 mg) by mouth twice daily in the morning and evening daily.    Tic       hydrOXYzine 25 MG tablet    ATARAX    60 tablet    Take 1-2 tablets (25-50 mg) by mouth every 6 hours as needed for anxiety    TAHIR (generalized anxiety disorder)       hyoscyamine 0.125 MG tablet    ANASPAZ/LEVSIN    40 tablet    Take 1-2 tablets (125-250 mcg) by mouth every 4 hours as needed for cramping    Abdominal pain, generalized       hypromellose 0.3 % Soln ophthalmic solution    GENTEAL     1 drop every hour as needed        LACTAID PO      Take by mouth daily        lactulose 20 GM/30ML Soln     300 mL    Take 30 mLs by mouth 3 times daily as needed        lidocaine 2 % topical gel    XYLOCAINE     Apply 1 Tube topically daily  Reported on 4/21/2017        lidocaine visc 2% 2.5mL/5mL & maalox/mylanta w/ simeth 2.5mL/5mL & diphenhydrAMINE 5mg/5mL Susp suspension    [facility]    1 Bottle    Swish and swallow 10 mLs in mouth every 6 hours as needed for mouth sores    Behcet's syndrome (H)       linaclotide 290 MCG capsule    LINZESS    90 capsule    Take 1 capsule (290 mcg) by mouth every morning (before breakfast)    Irritable bowel syndrome       LORazepam 1 MG tablet    ATIVAN    90 tablet    Take 0.5 tablets (0.5 mg) by mouth 2 times daily as needed for other (abdominal pain) Do not operate a vehicle after taking this medication    Chronic abdominal pain       meclizine 25 MG tablet    ANTIVERT    30 tablet    Take 1 tablet (25 mg) by mouth every 6 hours as needed for dizziness    Nausea       metaxalone 800 MG tablet    SKELAXIN    30 tablet    Take 0.5 tablets (400 mg) by mouth 3 times daily as needed for moderate pain    Sprain of neck, initial encounter, Cervical dystonia       norgestimate-ethinyl estradiol 0.25-35 MG-MCG per tablet    ORTHO-CYCLEN, SPRINTEC    84 tablet    Take 1 tablet by mouth daily    General counseling for prescription of oral contraceptives       omeprazole 40 MG capsule    priLOSEC    90 capsule    Take 1 capsule (40 mg) by mouth daily    Gastroesophageal reflux disease, esophagitis presence not specified       ondansetron 8 MG ODT tab    ZOFRAN-ODT    60 tablet    Take 1 tablet (8 mg) by mouth every 8 hours as needed for nausea    Non-intractable vomiting with nausea, unspecified vomiting type, Hematemesis, presence of nausea not specified       order for DME     1 Device    Post op shoe    Contusion of right foot, initial encounter       promethazine 25 MG tablet    PHENERGAN    20 tablet    Take 0.5-1 tablets (12.5-25 mg) by mouth every 6 hours as needed for nausea    Intractable chronic migraine without aura and without status migrainosus       sucralfate 1 GM/10ML suspension    CARAFATE     1200 mL    Take 10 mLs (1 g) by mouth 4 times daily    Bile reflux gastritis, Nausea       triamcinolone 0.1 % cream    KENALOG    15 g    Apply sparingly to oral ulcers three times daily for 14 days as needed.    Behcet's syndrome (H)       * Notice:  This list has 5 medication(s) that are the same as other medications prescribed for you. Read the directions carefully, and ask your doctor or other care provider to review them with you.
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
## 2017-08-31 NOTE — NURSING NOTE
"Chief Complaint   Patient presents with     Pain       Initial BP 94/62 (BP Location: Right arm, Patient Position: Chair, Cuff Size: Adult Small)  Pulse 72  Resp 16  Wt 66.2 kg (146 lb)  LMP 07/31/2017  SpO2 95%  BMI 25.86 kg/m2 Estimated body mass index is 25.86 kg/(m^2) as calculated from the following:    Height as of 8/22/17: 1.6 m (5' 3\").    Weight as of this encounter: 66.2 kg (146 lb).  Medication Reconciliation: complete       Eloise Amato MA  [facility]      "
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
## 2017-08-31 NOTE — PATIENT INSTRUCTIONS
After Visit Instructions:     Thank you for coming to [facility] [place] for your care. It is my goal to partner with you to help you reach your optimal state of health.     I am recommending multidisciplinary care at this time.  The focus of care will be to continue gradual rehabilitation and pain management with medication adjustments as needed.    Continue daily self-care, identifying contributing factors, and monitoring variations in pain level. Continue to integrate self-care into your life.      1. Schedule pain psychology assessment with Dr Joanie Reynaga. Contact information given  2. Schedule physical therapy visits with Emily  3. Schedule follow-up with EVI Lobato NP-C in 4 weeks  4. Medication recommendations: No changes at this time       EVI Miller NP-C  [facility]    Contact information: [facility]    Please call if any side effects, questions, or concerns arise.    Nurse Triage line:  [phone]   Call this number with any questions or concerns. You may leave a detailed message anytime. Calls are typically returned Monday through Friday between 8 AM and 4:30 PM. We usually get back to you within 2 business days depending on the issue/request.    Scheduling number: [phone].  Call this number to schedule or change appointments.          Medication Refills Policy:    For non-narcotic medications, please your pharmacy directly to request a refill and the pharmacy will call the [facility] Management Center for authorization. Please allow 3-4 days for these refills.    For narcotic refills, call the nurse triage line and leave a message requesting your refill along with the name of the pharmacy that you use. Narcotic prescriptions will be mailed to your pharmacy or you may pick them up at the clinic.  Please call 7-10 days before your refill is due  The above policy allows adequate time so that you do not run out  of medication.    No Show - Late Cancellation - Late Arrival Policy  We believe regular attendance is key to your success in our program.    Any time you are unable to keep your appointment we ask that you call us at  least 24 hours in advance to let us know. This will allow us to offer the appointment time to another patient. The following is our policy for missed appointments. This also applies to appointments cancelled with less than 24 hours notice.    You will receive a letter after missing your 1st and 2nd appointments without contacting the clinic before your scheduled appointment time.     After missing 3 appointments without calling first, we will cancel all of your future appointments at [facility].    At that point, you will not be able to resume services unless approved by your care team  We understand that unforseen circumstances arise, however, out of respect for all concerned and to provide this appointment to another patient, this policy will be enforced.    Please note that most follow up appointments are 30 minutes long. If you arrive late, your provider may not be able to see you for the entire 30 minutes. Please also note that if you arrive more than 15 minutes for any appointment, it may be rescheduled.

## 2017-09-01 NOTE — PROGRESS NOTES
"                                  [facility] [place]  Behavioral Medicine Visit    Patient Name: Samara Oropeza    YOB: 1995   Medical Record Number: [id number]  Date:8/31//2017                SUBJECTIVE:  Met with the patient on this date for an elective return appointment. Today the patient reports the following: Her pain is mildly worse, her mood is mildly worse, her activity level has mildly increased, her stress is moderately worse and her sleep is the same.  The patient reports she was cognizant of doing the assignments from her last meeting. She notes in particular thinking of other possible outcomes rather than her own immediate conclusions was particularly helpful in a situation that involved her involvement with friends and a bridal party.    The patient reports she feels as though \"I still have some issues but I feel like I'm getting better dealing with some of the situations\"          OBJECTIVE:   Length of Visit: 45 minutes      Assessment: Current Emotional / Mental Status    Appearance:   Appropriate   Eye Contact:   Good   Psychomotor Behavior: Normal   Attitude:   Cooperative   Orientation:   All  Speech  Rate / Production:             Normal   Volume:              Normal   Mood:    anxious  Affect:    Appropriate   Thought Content:  Clear   Thought Form:  Coherent  Logical   Insight:    Fair     ASSESSMENT:   Axis I: Chronic pain syndrome  Axis II: Dx deferred    Progress toward goals: fair.    Pain Status: worsened    Emotional Status: worsened              Medication / chemical use concerns:     PLAN:   Next Appointment: Samara Oropeza will schedule a follow-up appointment in 2 weeks.  Assignment: Continue with previous assignments  Objectives / interventions for next session: Continue follow-up    Kimo Hudson MA, LP, [facility]  Pain Specialist  [facility] [place]    "

## 2017-09-05 ENCOUNTER — THERAPY VISIT (OUTPATIENT)
Dept: PHYSICAL THERAPY | Facility: CLINIC | Age: 22
End: 2017-09-05
Payer: COMMERCIAL

## 2017-09-05 DIAGNOSIS — M25.562 LEFT KNEE PAIN: ICD-10-CM

## 2017-09-05 PROCEDURE — 97110 THERAPEUTIC EXERCISES: CPT | Mod: GP | Performed by: PHYSICAL THERAPIST

## 2017-09-05 PROCEDURE — 97112 NEUROMUSCULAR REEDUCATION: CPT | Mod: GP | Performed by: PHYSICAL THERAPIST

## 2017-09-12 ENCOUNTER — OFFICE VISIT (OUTPATIENT)
Dept: PALLIATIVE MEDICINE | Facility: CLINIC | Age: 22
End: 2017-09-12
Payer: COMMERCIAL

## 2017-09-12 DIAGNOSIS — G89.4 CHRONIC PAIN SYNDROME: Primary | ICD-10-CM

## 2017-09-12 DIAGNOSIS — Z87.39 HISTORY OF FIBROMYALGIA: ICD-10-CM

## 2017-09-12 PROCEDURE — 96152 HC HEALTH AND BEHAVIOR INTERVENTION, INDIVIDUAL, EACH 15 MINUTES: CPT | Performed by: PSYCHOLOGIST

## 2017-09-12 PROCEDURE — 97535 SELF CARE MNGMENT TRAINING: CPT | Mod: GP | Performed by: PHYSICAL THERAPIST

## 2017-09-12 PROCEDURE — 97112 NEUROMUSCULAR REEDUCATION: CPT | Mod: GP | Performed by: PHYSICAL THERAPIST

## 2017-09-12 NOTE — PROGRESS NOTES
"Patient Name: Samara Oropeza      YOB: 1995     Medical Record Number: [id number]  Diagnosis: Chronic pain syndrome  Visit Info Length of Visit: 50 min  Arrived: On Time  Therapeutic Procedures/Exercises: NA; Therapeutic Activities: 10 min; Neuromuscular Re-education: 10 min;   Self Care / Home Management Trainin min    Exercise/Activity Education Instruction:  Pacing/Energy Conservation - Reviewed general pacing stratgies; had pt problem solve how she could have used some of these strategies during the wedding festivities for better sx management.      Self Care -  flare management education: included having pt write out a flare plan   Activity:  Mindful movement: Instructed in 2 yoga asanas to use as a body awareness/relaxation tool:   -mountain pose  -parting the clouds  Incorporated mindful breathing into the two poses.    Pt reported feeling more relaxed and her chest felt more open after practicing.     Notes: Patient reports: generally feeling better d/t being up to baseline dose of a medication to manage her \"disease\".    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  Not much use of self care within the midst of this flare d/t wanting to keep up with her friends and putting needs of others ahead of her own.    HEP/Self Care/Home Management Training:   Pacing/Mini Breaks/Self Care: doesn't use much as doesn't want to be limited ;   Relaxation Practice: tries to use some relaxation breathing at times but it is hard - especially when she is in the midst of increased stress / anxiety ;   Posture Corrections: not addressed today.  ;   Walking program: when not in a flare continues with her 1.5-2H of high intense workout videos ;   Heat/Ice/TENS: uses heat when pain gets bad  ;   HEP stretching daily     Wearing walking brace on her right foot, no AD today - gait pattern normal.  No pain behaviors " present.         Demonstrates/Verbalizes Technique: 3, 4 (1= poor technique-difficulty performing exercises,significant cues required; 5= good technique-performs exercises without cues)  Body Awareness: 3 (1=low awareness; 5=high awareness)  Posture/Stability: 3 (1= poor posture, stability; 5= good posture, stability)   Motivational Level:   Eager to learn and Cooperative Participation:  Full   Patient Satisfaction:  satisfied   Response to Teaching:   demonstrated ability and verbalized, recall/understanding  Factors that affect learning: None   Plan of Care  Cont PT to support reactivation and integration of self regulation pain management skills. (next visit consider:  Mini breaks, practice yoga asanas and add, core)   Therapist: Emily Rollins PT                 Date: 9/12/2017

## 2017-09-12 NOTE — MR AVS SNAPSHOT
After Visit Summary   9/12/2017    Samara Oropeza    MRN: [id number]           Patient Information     Date Of Birth          1995        Visit Information        Provider Department      9/12/2017 9:15 AM Emily Rollins, PT [facility]        Today's Diagnoses     Chronic pain syndrome    -  1       Follow-ups after your visit        Your next 10 appointments already scheduled     Sep 13, 2017  3:00 PM CDT   (Arrive by 2:45 PM)   Return Botox with Frederick Bender MD   [facility] ([facility] [place])    [address]  [address]rd [facility] [id number] [phone]            Sep 19, 2017 11:30 AM CDT   Return Visit with Austen Marquez MD   [facility] ([facility])    [address] [id number] [phone]            Sep 19, 2017  2:30 PM CDT   LUIZ Extremity with Jennifer Alvarenga PT   [facility] ([facility])    [address] [facility] [id number] [phone]            Sep 26, 2017  3:15 PM CDT   Return Visit with Emily Rollins PT   [facility] ([facility])    [address]  [address]  [facility] [id number] [phone]            Sep 27, 2017  8:30 AM CDT   Return Visit with EVI Dahl CNP   [facility] ([facility])    [address]  [address]  [facility] [id number] [phone]            Sep 27, 2017 11:00 AM CDT   Return Visit with Kimo Hudson LP   [facility] [place] ([facility] [place])    [address]  [place] [address] [phone]            Oct 09, 2017  2:15 PM CDT   Return Visit with Cata Hurst NP   [facility] ([facility])    [address]  [address]  [address]  [facility] [address]   [phone]            Oct 10, 2017 11:45 AM CDT   Return Visit with Emily Rollins PT   [facility] ([facility])    [address]  [facility] [address]  [facility] [address] [phone]            Oct 11, 2017  1:00 PM CDT   (Arrive by 12:45 PM)   Return Visit with EVI Vizcaino [facility] ([facility] [place])    [address]  4th Floor  [facility] [address] [phone]              Who to contact     If you have questions or need follow up information about today's clinic visit or your schedule please contact [facility] [place] directly at [phone].  Normal or non-critical lab and imaging results will be communicated to you by MyChart, letter or phone within 4 business days after the clinic has received the results. If you do not hear from us within 7 days, please contact the clinic through [employer] or phone. If you have a critical or abnormal lab result, we will notify you by phone as soon as possible.  Submit refill requests through [employer] or call your pharmacy and they will forward the refill request to us. Please allow 3 business days for your refill to be completed.          Additional Information About Your Visit        MyChart Information     [employer] lets you send messages to your doctor, view your test results, renew your prescriptions, schedule appointments and more. To sign up, go to [email or web address] . Click on \"Log in\" on the left side of the screen, which will take you to the Welcome page. Then click on \"Sign up Now\" on the right side of the page.     You will be asked to enter the access code listed below, as well as some personal information. Please follow the directions to create your username and password.     Your access code is: [email or web address]  Expires: 10/29/2017 10:26 AM     Your access code will  in 90 days. " If you need help or a new code, please call your [place] clinic or [phone].        Care EveryWhere ID     This is your Care EveryWhere ID. This could be used by other organizations to access your [place] medical records  [id number]        Your Vitals Were     Last Period                   07/31/2017            Blood Pressure from Last 3 Encounters:   08/31/17 94/62   08/22/17 110/72   08/20/17 103/66    Weight from Last 3 Encounters:   08/31/17 66.2 kg (146 lb)   08/22/17 65.8 kg (145 lb)   08/20/17 65.8 kg (145 lb)              We Performed the Following     NEUROMUSCULAR RE-EDUCATION     SELF CARE MNGMENT TRAINING          Today's Medication Changes          These changes are accurate as of: 9/12/17  3:44 PM.  If you have any questions, ask your nurse or doctor.               These medicines have changed or have updated prescriptions.        Dose/Directions    amitriptyline 25 MG tablet   Commonly known as:  ELAVIL   This may have changed:  how much to take   Used for:  Atypical chest pain, Insomnia, unspecified type        Dose:  25 mg   Take 1 tablet (25 mg) by mouth At Bedtime   Quantity:  45 tablet   Refills:  5                Primary Care Provider Office Phone # Fax #    Sonja EVI Laguerre [facility] [phone]       [address] [facility] [address]  [facility] [address]        Equal Access to Services     NABIL GALEANO : Hadii malcolm ku hadasho Soomaali, waaxda luqadaha, qaybta kaalmada adeegyada, mike parsons. So [facility] [place] [phone].    ATENCIÓN: Si habla español, tiene a livingston disposición servicios gratuitos de asistencia lingüística. Jesus al [phone].    We comply with applicable federal civil rights laws and [place] laws. We do not discriminate on the basis of race, color, national origin, age, disability sex, sexual orientation or gender identity.            Thank you!     Thank you for choosing [facility] [place]  for your care. Our goal is always to  provide you with excellent care. Hearing back from our patients is one way we can continue to improve our services. Please take a few minutes to complete the written survey that you may receive in the mail after your visit with us. Thank you!             Your Updated Medication List - Protect others around you: Learn how to safely use, store and throw away your medicines at www.disposemymeds.org.          This list is accurate as of: 9/12/17  3:44 PM.  Always use your most recent med list.                   Brand Name Dispense Instructions for use Diagnosis    albuterol 108 (90 BASE) MCG/ACT Inhaler    PROAIR HFA/PROVENTIL HFA/VENTOLIN HFA    1 Inhaler    Inhale 2 puffs into the lungs every 6 hours as needed for shortness of breath / dyspnea or wheezing    Atypical chest pain       amitriptyline 25 MG tablet    ELAVIL    45 tablet    Take 1 tablet (25 mg) by mouth At Bedtime    Atypical chest pain, Insomnia, unspecified type       apremilast 30 MG tablet    OTEZLA    60 tablet    20 mg in AM and 30mg in PM daily X 2 weeks and then 30mg twice daily.    Behcet's disease (H)       ASPIRIN CHILDRENS 81 MG chewable tablet   Generic drug:  aspirin      Take 81 mg by mouth as needed        betamethasone valerate 0.1 % cream    VALISONE     Apply topically 2 times daily        * botulinum toxin type A 100 UNITS injection    BOTOX    200 Units    Inject 200 Units into the muscle every 3 months    Cervical dystonia       * BOTOX IJ      Inject 150 Units into the muscle once Lot: /C3 Exp: 05/2019        * BOTOX IJ      Inject 150 Units into the muscle Lot # /C3  Exp: 8/2019        * BOTOX IJ      Inject 162.5 Units as directed once Lot #: /C3 Exp: 10/2019        * ONABOTULINUMTOXINA IJ      Inject 165 Units as directed once Lot # /C3 Expiration Date: 01/2020        BusPIRone HCl 30 MG Tabs     60 tablet    Take 15 mg by mouth 2 times daily Increased dose. For anxiety.    TAHIR (generalized anxiety disorder)        carbamide peroxide 6.5 % otic solution    DEBROX     5 drops 2 times daily        clobetasol 0.05 % cream    TEMOVATE    60 g    Apply topically 2 times daily    Folliculitis       Colchicine 0.6 MG Caps     90 capsule    Take 0.6 mg by mouth See Admin Instructions 2 capsules in AM and 1 capsule in PM    Behcet's syndrome (H)       diclofenac 1 % Gel topical gel    VOLTAREN    100 g    Apply 2-4 grams to affected area(s) up to 4 times per day as needed. This is an anti-inflammatory medication.    Polyarthralgia       dicyclomine 20 MG tablet    BENTYL    60 tablet    Take 1 tablet (20 mg) by mouth 4 times daily as needed    Abdominal cramping       diphenhydrAMINE 25 MG tablet    BENADRYL    30 tablet    Take 1 tablet (25 mg) by mouth every 8 hours as needed for allergies        EPINEPHrine 0.3 MG/0.3ML injection 2-pack    EPIPEN 2-EAMON    0.6 mL    Inject 0.3 mLs (0.3 mg) into the muscle as needed for anaphylaxis    Hx of bee sting allergy       EXCEDRIN MIGRAINE PO      Take by mouth as needed        guanFACINE 1 MG tablet    TENEX    180 tablet    Take 3 tablets (3 mg) by mouth twice daily in the morning and evening daily.    Tic       hydrOXYzine 25 MG tablet    ATARAX    60 tablet    Take 1-2 tablets (25-50 mg) by mouth every 6 hours as needed for anxiety    TAHIR (generalized anxiety disorder)       hyoscyamine 0.125 MG tablet    ANASPAZ/LEVSIN    40 tablet    Take 1-2 tablets (125-250 mcg) by mouth every 4 hours as needed for cramping    Abdominal pain, generalized       hypromellose 0.3 % Soln ophthalmic solution    GENTEAL     1 drop every hour as needed        LACTAID PO      Take by mouth daily        lactulose 20 GM/30ML Soln     300 mL    Take 30 mLs by mouth 3 times daily as needed        lidocaine 2 % topical gel    XYLOCAINE     Apply 1 Tube topically daily Reported on 4/21/2017        lidocaine visc 2% 2.5mL/5mL & maalox/mylanta w/ simeth 2.5mL/5mL & diphenhydrAMINE 5mg/5mL Susp suspension     [facility]    1 Bottle    Swish and swallow 10 mLs in mouth every 6 hours as needed for mouth sores    Behcet's syndrome (H)       linaclotide 290 MCG capsule    LINZESS    90 capsule    Take 1 capsule (290 mcg) by mouth every morning (before breakfast)    Irritable bowel syndrome       LORazepam 1 MG tablet    ATIVAN    90 tablet    Take 0.5 tablets (0.5 mg) by mouth 2 times daily as needed for other (abdominal pain) Do not operate a vehicle after taking this medication    Chronic abdominal pain       meclizine 25 MG tablet    ANTIVERT    30 tablet    Take 1 tablet (25 mg) by mouth every 6 hours as needed for dizziness    Nausea       metaxalone 800 MG tablet    SKELAXIN    30 tablet    Take 0.5 tablets (400 mg) by mouth 3 times daily as needed for moderate pain    Sprain of neck, initial encounter, Cervical dystonia       norgestimate-ethinyl estradiol 0.25-35 MG-MCG per tablet    ORTHO-CYCLEN, SPRINTEC    84 tablet    Take 1 tablet by mouth daily    General counseling for prescription of oral contraceptives       omeprazole 40 MG capsule    priLOSEC    90 capsule    Take 1 capsule (40 mg) by mouth daily    Gastroesophageal reflux disease, esophagitis presence not specified       ondansetron 8 MG ODT tab    ZOFRAN-ODT    60 tablet    Take 1 tablet (8 mg) by mouth every 8 hours as needed for nausea    Non-intractable vomiting with nausea, unspecified vomiting type, Hematemesis, presence of nausea not specified       order for DME     1 Device    Post op shoe    Contusion of right foot, initial encounter       promethazine 25 MG tablet    PHENERGAN    20 tablet    Take 0.5-1 tablets (12.5-25 mg) by mouth every 6 hours as needed for nausea    Intractable chronic migraine without aura and without status migrainosus       sucralfate 1 GM/10ML suspension    CARAFATE    1200 mL    Take 10 mLs (1 g) by mouth 4 times daily    Bile reflux gastritis, Nausea       triamcinolone 0.1 % cream    KENALOG    15 g     Apply sparingly to oral ulcers three times daily for 14 days as needed.    Behcet's syndrome (H)       * Notice:  This list has 5 medication(s) that are the same as other medications prescribed for you. Read the directions carefully, and ask your doctor or other care provider to review them with you.

## 2017-09-12 NOTE — MR AVS SNAPSHOT
After Visit Summary   9/12/2017    Samara Oropeza    MRN: [id number]           Patient Information     Date Of Birth          1995        Visit Information        Provider Department      9/12/2017 1:00 PM Kimo Hudson LP [facility]        Today's Diagnoses     Chronic pain syndrome    -  1    History of fibromyalgia           Follow-ups after your visit        Your next 10 appointments already scheduled     Sep 19, 2017 11:30 AM CDT   Return Visit with Austen Marquez MD   [facility] ([facility])    [address] [address]   [phone]            Sep 22, 2017  2:50 PM CDT   LUIZ Extremity with Jennifer Alvarenga PT   [facility] ([facility])    [address] [place] [id number] [phone]            Sep 26, 2017  3:15 PM CDT   Return Visit with Emily Rollins PT   [facility] ([facility])    [address]  [facility] [address] [phone]            Sep 27, 2017  8:30 AM CDT   Return Visit with EVI Dahl CNP   [facility] ([facility])    [address]  [facility] [address] [phone]            Sep 27, 2017 11:00 AM CDT   Return Visit with Kimo Hudson LP   [facility] ([facility])    [address] [facility] [id number] [phone]            Oct 09, 2017  2:15 PM CDT   Return Visit with Cata Hurst NP   [facility]    [address]  [address]  [facility] [address] [phone]            Oct 10, 2017 11:45 AM CDT   Return Visit with Emily Rollins PT   [facility] ([facility])    [address]  [facility] [address]  "  [phone]            Oct 11, 2017  1:00 PM CDT   (Arrive by 12:45 PM)   Return Visit with EVI Vizcaino CNP   [facility] ([facility])    [address] [place] [address]  [address] [facility] [address] [phone]            Dec 06, 2017  3:00 PM CST   (Arrive by 2:45 PM)   Return Botox with Frederick Bender MD   [facility] ([facility])    [address] [place] [address] [phone]              Who to contact     If you have questions or need follow up information about today's clinic visit or your schedule please contact [facility] directly at [phone].  Normal or non-critical lab and imaging results will be communicated to you by MyChart, letter or phone within 4 business days after the clinic has received the results. If you do not hear from us within 7 days, please contact the clinic through MyChart or phone. If you have a critical or abnormal lab result, we will notify you by phone as soon as possible.  Submit refill requests through [email or web address] or call your pharmacy and they will forward the refill request to us. Please allow 3 business days for your refill to be completed.          Additional Information About Your Visit        My[employer] Education Information     [email or web address] lets you send messages to your doctor, view your test results, renew your prescriptions, schedule appointments and more. To sign up, go to [email or web address] . Click on \"Log in\" on the left side of the screen, which will take you to the Welcome page. Then click on \"Sign up Now\" on the right side of the page.     You will be asked to enter the access code listed below, as well as some personal information. Please follow the directions to create your username and password.     Your access code is: [email or web address]  Expires: 10/29/2017 10:26 AM     Your access code will  in 90 " days. If you need help or a new code, please call your [facility] clinic or [phone].        Care EveryWhere ID     This is your Care EveryWhere ID. This could be used by other organizations to access your [facility] medical records  [id number]        Your Vitals Were     Last Period                   07/31/2017            Blood Pressure from Last 3 Encounters:   09/13/17 109/65   08/31/17 94/62   08/22/17 110/72    Weight from Last 3 Encounters:   09/13/17 64.9 kg (143 lb)   08/31/17 66.2 kg (146 lb)   08/22/17 65.8 kg (145 lb)              We Performed the Following     HEALTH & BEHAVIOR ASSESS, INITIAL, EA 15MIN PHD          Today's Medication Changes          These changes are accurate as of: 9/12/17 11:59 PM.  If you have any questions, ask your nurse or doctor.               These medicines have changed or have updated prescriptions.        Dose/Directions    amitriptyline 25 MG tablet   Commonly known as:  ELAVIL   This may have changed:  how much to take   Used for:  Atypical chest pain, Insomnia, unspecified type        Dose:  25 mg   Take 1 tablet (25 mg) by mouth At Bedtime   Quantity:  45 tablet   Refills:  5                Primary Care Provider Office Phone # Fax #    Sonja EVI Laguerre [facility] [phone]       [address] [facility] [address]  [facility] [address]        Equal Access to Services     NABIL GALEANO AH: Hadii malcolm brady hadasho Soteddyali, waaxda luqadaha, qaybta kaalmada adedavidyada, mike parsons. So [facility] [phone].    ATENCIÓN: Si habla español, tiene a livingston disposición servicios gratuitos de asistencia lingüística. Jesus al [phone].    We comply with applicable federal civil rights laws and [place] laws. We do not discriminate on the basis of race, color, national origin, age, disability sex, sexual orientation or gender identity.            Thank you!     Thank you for choosing [facility]  for your care. Our goal is always to provide you  with excellent care. Hearing back from our patients is one way we can continue to improve our services. Please take a few minutes to complete the written survey that you may receive in the mail after your visit with us. Thank you!             Your Updated Medication List - Protect others around you: Learn how to safely use, store and throw away your medicines at www.disposemymeds.org.          This list is accurate as of: 9/12/17 11:59 PM.  Always use your most recent med list.                   Brand Name Dispense Instructions for use Diagnosis    albuterol 108 (90 BASE) MCG/ACT Inhaler    PROAIR HFA/PROVENTIL HFA/VENTOLIN HFA    1 Inhaler    Inhale 2 puffs into the lungs every 6 hours as needed for shortness of breath / dyspnea or wheezing    Atypical chest pain       amitriptyline 25 MG tablet    ELAVIL    45 tablet    Take 1 tablet (25 mg) by mouth At Bedtime    Atypical chest pain, Insomnia, unspecified type       apremilast 30 MG tablet    OTEZLA    60 tablet    20 mg in AM and 30mg in PM daily X 2 weeks and then 30mg twice daily.    Behcet's disease (H)       ASPIRIN CHILDRENS 81 MG chewable tablet   Generic drug:  aspirin      Take 81 mg by mouth as needed        betamethasone valerate 0.1 % cream    VALISONE     Apply topically 2 times daily        * botulinum toxin type A 100 UNITS injection    BOTOX    200 Units    Inject 200 Units into the muscle every 3 months    Cervical dystonia       * BOTOX IJ      Inject 150 Units into the muscle once Lot: /C3 Exp: 05/2019        * BOTOX IJ      Inject 150 Units into the muscle Lot # /C3  Exp: 8/2019        * BOTOX IJ      Inject 162.5 Units as directed once Lot #: /C3 Exp: 10/2019        * ONABOTULINUMTOXINA IJ      Inject 165 Units as directed once Lot # /C3 Expiration Date: 01/2020        BusPIRone HCl 30 MG Tabs     60 tablet    Take 15 mg by mouth 2 times daily Increased dose. For anxiety.    TAHIR (generalized anxiety disorder)        carbamide peroxide 6.5 % otic solution    DEBROX     5 drops 2 times daily        clobetasol 0.05 % cream    TEMOVATE    60 g    Apply topically 2 times daily    Folliculitis       Colchicine 0.6 MG Caps     90 capsule    Take 0.6 mg by mouth See Admin Instructions 2 capsules in AM and 1 capsule in PM    Behcet's syndrome (H)       diclofenac 1 % Gel topical gel    VOLTAREN    100 g    Apply 2-4 grams to affected area(s) up to 4 times per day as needed. This is an anti-inflammatory medication.    Polyarthralgia       dicyclomine 20 MG tablet    BENTYL    60 tablet    Take 1 tablet (20 mg) by mouth 4 times daily as needed    Abdominal cramping       diphenhydrAMINE 25 MG tablet    BENADRYL    30 tablet    Take 1 tablet (25 mg) by mouth every 8 hours as needed for allergies        EPINEPHrine 0.3 MG/0.3ML injection 2-pack    EPIPEN 2-EAMON    0.6 mL    Inject 0.3 mLs (0.3 mg) into the muscle as needed for anaphylaxis    Hx of bee sting allergy       EXCEDRIN MIGRAINE PO      Take by mouth as needed        guanFACINE 1 MG tablet    TENEX    180 tablet    Take 3 tablets (3 mg) by mouth twice daily in the morning and evening daily.    Tic       hydrOXYzine 25 MG tablet    ATARAX    60 tablet    Take 1-2 tablets (25-50 mg) by mouth every 6 hours as needed for anxiety    TAHIR (generalized anxiety disorder)       hyoscyamine 0.125 MG tablet    ANASPAZ/LEVSIN    40 tablet    Take 1-2 tablets (125-250 mcg) by mouth every 4 hours as needed for cramping    Abdominal pain, generalized       hypromellose 0.3 % Soln ophthalmic solution    GENTEAL     1 drop every hour as needed        LACTAID PO      Take by mouth daily        lactulose 20 GM/30ML Soln     300 mL    Take 30 mLs by mouth 3 times daily as needed        lidocaine 2 % topical gel    XYLOCAINE     Apply 1 Tube topically daily Reported on 4/21/2017        lidocaine visc 2% 2.5mL/5mL & maalox/mylanta w/ simeth 2.5mL/5mL & diphenhydrAMINE 5mg/5mL Susp suspension    MAGIC  Mouthwash [facility]    1 Bottle    Swish and swallow 10 mLs in mouth every 6 hours as needed for mouth sores    Behcet's syndrome (H)       linaclotide 290 MCG capsule    LINZESS    90 capsule    Take 1 capsule (290 mcg) by mouth every morning (before breakfast)    Irritable bowel syndrome       LORazepam 1 MG tablet    ATIVAN    90 tablet    Take 0.5 tablets (0.5 mg) by mouth 2 times daily as needed for other (abdominal pain) Do not operate a vehicle after taking this medication    Chronic abdominal pain       meclizine 25 MG tablet    ANTIVERT    30 tablet    Take 1 tablet (25 mg) by mouth every 6 hours as needed for dizziness    Nausea       metaxalone 800 MG tablet    SKELAXIN    30 tablet    Take 0.5 tablets (400 mg) by mouth 3 times daily as needed for moderate pain    Sprain of neck, initial encounter, Cervical dystonia       norgestimate-ethinyl estradiol 0.25-35 MG-MCG per tablet    ORTHO-CYCLEN, SPRINTEC    84 tablet    Take 1 tablet by mouth daily    General counseling for prescription of oral contraceptives       omeprazole 40 MG capsule    priLOSEC    90 capsule    Take 1 capsule (40 mg) by mouth daily    Gastroesophageal reflux disease, esophagitis presence not specified       ondansetron 8 MG ODT tab    ZOFRAN-ODT    60 tablet    Take 1 tablet (8 mg) by mouth every 8 hours as needed for nausea    Non-intractable vomiting with nausea, unspecified vomiting type, Hematemesis, presence of nausea not specified       order for DME     1 Device    Post op shoe    Contusion of right foot, initial encounter       promethazine 25 MG tablet    PHENERGAN    20 tablet    Take 0.5-1 tablets (12.5-25 mg) by mouth every 6 hours as needed for nausea    Intractable chronic migraine without aura and without status migrainosus       sucralfate 1 GM/10ML suspension    CARAFATE    1200 mL    Take 10 mLs (1 g) by mouth 4 times daily    Bile reflux gastritis, Nausea       triamcinolone 0.1 % cream    KENALOG    15 g    Apply  sparingly to oral ulcers three times daily for 14 days as needed.    Behcet's syndrome (H)       * Notice:  This list has 5 medication(s) that are the same as other medications prescribed for you. Read the directions carefully, and ask your doctor or other care provider to review them with you.

## 2017-09-13 ENCOUNTER — OFFICE VISIT (OUTPATIENT)
Dept: PHYSICAL MEDICINE AND REHAB | Facility: CLINIC | Age: 22
End: 2017-09-13

## 2017-09-13 VITALS
BODY MASS INDEX: 25.34 KG/M2 | SYSTOLIC BLOOD PRESSURE: 109 MMHG | WEIGHT: 143 LBS | HEART RATE: 100 BPM | DIASTOLIC BLOOD PRESSURE: 65 MMHG | HEIGHT: 63 IN

## 2017-09-13 DIAGNOSIS — G43.719 INTRACTABLE CHRONIC MIGRAINE WITHOUT AURA AND WITHOUT STATUS MIGRAINOSUS: Primary | ICD-10-CM
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
## 2017-09-13 NOTE — PROGRESS NOTES
"                                  [facility]   [facility] [place]  Behavioral Medicine Visit    Patient Name: Samara Oropeza    YOB: 1995   Medical Record Number: [id number]  Date: 9/13/2017                SUBJECTIVE: The patient on this date for an elective return appointment. Today the patient reports the following: Her pain is mildly worse, her mood is mildly worse, her activity level has mildly increased, her stress level is mildly worse and her sleep has been the same.    Today the patient shares a journal entry she wrote regarding interacting with others around her pain symptoms and in particular how \"they\" dismiss her due to the frequency of her complaints.    Today the patient reports many stressors related to interpersonal interaction with her partner, her parents, needing to move, and her relationships with her peers. She has many complaints about multiple areas of her body. She notes ultimately her concern is she does not see how she can think of herself as having a future. By this she reports she is mildly discouraged, she does not mean to imply she has suicidal ideation or plan.          OBJECTIVE:   Length of Visit: 60 minutes      Assessment: Current Emotional / Mental Status    Appearance:   Appropriate   Eye Contact:   Fair   Psychomotor Behavior: Normal   Attitude:   Cooperative   Orientation:   All  Speech  Rate / Production:             Normal  Slow   Volume:              Normal   Mood:    Depressed   Affect:    Flat  Restricted   Thought Content:  Clear   Thought Form:  Coherent  Logical   Insight:    Fair     ASSESSMENT:   Axis I: Pain Disorder   Chronic pain syndrome  Axis II: Dx deferred    Progress toward goals: fair.    Pain Status: improved and slight    Emotional Status: worsened              Medication / chemical use concerns:     PLAN:   Next Appointment: Samara Oropeza will schedule a follow-up appointment in 2 " weeks.  Assignment: Continue pain journaling  Objectives / interventions for next session: To be determined    Kimo Hudson MA, LP, [facility]  Pain Specialist  [facility] [place]

## 2017-09-13 NOTE — MR AVS SNAPSHOT
After Visit Summary   9/13/2017    Samara Oropeza    MRN: [id number]           Patient Information     Date Of Birth          1995        Visit Information        Provider Department      9/13/2017 3:00 PM Frederick Bendre MD [facility] Physical Medicine [facility] Rehabilitation        Today's Diagnoses     Intractable chronic migraine without aura and without status migrainosus    -  1       Follow-ups after your visit        Your next 10 appointments already scheduled     Sep 19, 2017 11:30 AM CDT   Return Visit with Austen Marquez MD   [facility] ([facility])    [address] [address]   [phone]            Sep 19, 2017  2:30 PM CDT   LUIZ Extremity with Jennifer Alvarenga PT   [facility] LUIZ ([facility])    [address] [place] [address] [phone]            Sep 26, 2017  3:15 PM CDT   Return Visit with Emily Rollins PT   [facility] ([facility])    [address]  [facility] [address] [phone]            Sep 27, 2017  8:30 AM CDT   Return Visit with EVI Dahl CNP   [facility] ([facility])    [address]  [facility] [address] [phone]            Sep 27, 2017 11:00 AM CDT   Return Visit with Kimo Hudson LP   [facility] ([facility])    [address] [facility] [address] [phone]            Oct 09, 2017  2:15 PM CDT   Return Visit with Cata Hurst NP   [facility] ([facility])    [address]  [address]  [facility] [address] [phone]            Oct 10, 2017 11:45 AM CDT   Return Visit with Emily Rollins PT   [facility] ([facility])    [address]  [facility] [address]   [phone]            Oct 11, 2017  1:00 PM CDT   (Arrive by 12:45 PM)   Return Visit with EVI Vizcaino CNP   [facility] ([facility])    [address] [facility]  [address] [facility] [address] [phone]            Dec 06, 2017  3:00 PM CST   (Arrive by 2:45 PM)   Return Botox with Frederick Bender MD   [facility] Medicine [facility] Rehabilitation ([facility])    [address] [facility] [address]  [address] [facility] [address] [phone]              Who to contact     Please call your clinic at [phone] to:    Ask questions about your health    Make or cancel appointments    Discuss your medicines    Learn about your test results    Speak to your doctor   If you have compliments or concerns about an experience at your clinic, or if you wish to file a complaint, please contact [facility] Patient Relations at [phone] or email us at [email or web address]         Additional Information About Your Visit        [employer] Information     [employer] is an electronic gateway that provides easy, online access to your medical records. With [employer], you can request a clinic appointment, read your test results, renew a prescription or communicate with your care team.     To sign up for [employer] visit the website at [email or web address]   You will be asked to enter the access code listed below, as well as some personal information. Please follow the directions to create your username and password.     Your access code is: [email or web address]  Expires: 10/29/2017 10:26 AM     Your access code will  in 90 days. If you need help or a new code, please contact your [facility] or call [phone] for assistance.        Care EveryWhere ID     This is your Care EveryWhere ID. This could be used by other  "organizations to access your [place] medical records  [id number]        Your Vitals Were     Pulse Height Last Period BMI (Body Mass Index)          100 1.6 m (5' 3\") 07/31/2017 25.33 kg/m2         Blood Pressure from Last 3 Encounters:   09/13/17 109/65   08/31/17 94/62   08/22/17 110/72    Weight from Last 3 Encounters:   09/13/17 64.9 kg (143 lb)   08/31/17 66.2 kg (146 lb)   08/22/17 65.8 kg (145 lb)              We Performed the Following     HC CHEMODENERVATE FACIAL,TRIGERM,NERV MIGRAINE     Needle EMG Guide w/Chemodenervation (14605)          Today's Medication Changes          These changes are accurate as of: 9/13/17  4:37 PM.  If you have any questions, ask your nurse or doctor.               These medicines have changed or have updated prescriptions.        Dose/Directions    amitriptyline 25 MG tablet   Commonly known as:  ELAVIL   This may have changed:  how much to take   Used for:  Atypical chest pain, Insomnia, unspecified type        Dose:  25 mg   Take 1 tablet (25 mg) by mouth At Bedtime   Quantity:  45 tablet   Refills:  5                Primary Care Provider Office Phone # Fax #    Sonja EVI Lag[facility] [phone]       [address] [facility] [address]  [facility] [address]        Equal Access to Services     NABIL GALEANO : Hadii malcolm brady hadasho Soomaali, waaxda luqadaha, qaybta kaalmada adeegyada, mike parsons. So [facility] [phone].    ATENCIÓN: Si habla español, tiene a livingston disposición servicios gratuitos de asistencia lingüística. Jesus al [phone].    We comply with applicable federal civil rights laws and [place] laws. We do not discriminate on the basis of race, color, national origin, age, disability sex, sexual orientation or gender identity.            Thank you!     Thank you for choosing [facility]  for your care. Our goal is always to provide you with excellent care. Hearing back from our patients is " one way we can continue to improve our services. Please take a few minutes to complete the written survey that you may receive in the mail after your visit with us. Thank you!             Your Updated Medication List - Protect others around you: Learn how to safely use, store and throw away your medicines at www.disposemymeds.org.          This list is accurate as of: 9/13/17  4:37 PM.  Always use your most recent med list.                   Brand Name Dispense Instructions for use Diagnosis    albuterol 108 (90 BASE) MCG/ACT Inhaler    PROAIR HFA/PROVENTIL HFA/VENTOLIN HFA    1 Inhaler    Inhale 2 puffs into the lungs every 6 hours as needed for shortness of breath / dyspnea or wheezing    Atypical chest pain       amitriptyline 25 MG tablet    ELAVIL    45 tablet    Take 1 tablet (25 mg) by mouth At Bedtime    Atypical chest pain, Insomnia, unspecified type       apremilast 30 MG tablet    OTEZLA    60 tablet    20 mg in AM and 30mg in PM daily X 2 weeks and then 30mg twice daily.    Behcet's disease (H)       ASPIRIN CHILDRENS 81 MG chewable tablet   Generic drug:  aspirin      Take 81 mg by mouth as needed        betamethasone valerate 0.1 % cream    VALISONE     Apply topically 2 times daily        * botulinum toxin type A 100 UNITS injection    BOTOX    200 Units    Inject 200 Units into the muscle every 3 months    Cervical dystonia       * BOTOX IJ      Inject 150 Units into the muscle once Lot: /C3 Exp: 05/2019        * BOTOX IJ      Inject 150 Units into the muscle Lot # /C3  Exp: 8/2019        * BOTOX IJ      Inject 162.5 Units as directed once Lot #: /C3 Exp: 10/2019        * ONABOTULINUMTOXINA IJ      Inject 165 Units as directed once Lot # /C3 Expiration Date: 01/2020        * ONABOTULINUMTOXINA IJ      Inject 165 Units as directed once Lot# /C3 Expiration: 04/2020        BusPIRone HCl 30 MG Tabs     60 tablet    Take 15 mg by mouth 2 times daily Increased dose. For anxiety.     TAHIR (generalized anxiety disorder)       carbamide peroxide 6.5 % otic solution    DEBROX     5 drops 2 times daily        clobetasol 0.05 % cream    TEMOVATE    60 g    Apply topically 2 times daily    Folliculitis       Colchicine 0.6 MG Caps     90 capsule    Take 0.6 mg by mouth See Admin Instructions 2 capsules in AM and 1 capsule in PM    Behcet's syndrome (H)       diclofenac 1 % Gel topical gel    VOLTAREN    100 g    Apply 2-4 grams to affected area(s) up to 4 times per day as needed. This is an anti-inflammatory medication.    Polyarthralgia       dicyclomine 20 MG tablet    BENTYL    60 tablet    Take 1 tablet (20 mg) by mouth 4 times daily as needed    Abdominal cramping       diphenhydrAMINE 25 MG tablet    BENADRYL    30 tablet    Take 1 tablet (25 mg) by mouth every 8 hours as needed for allergies        EPINEPHrine 0.3 MG/0.3ML injection 2-pack    EPIPEN 2-EAMON    0.6 mL    Inject 0.3 mLs (0.3 mg) into the muscle as needed for anaphylaxis    Hx of bee sting allergy       EXCEDRIN MIGRAINE PO      Take by mouth as needed        guanFACINE 1 MG tablet    TENEX    180 tablet    Take 3 tablets (3 mg) by mouth twice daily in the morning and evening daily.    Tic       hydrOXYzine 25 MG tablet    ATARAX    60 tablet    Take 1-2 tablets (25-50 mg) by mouth every 6 hours as needed for anxiety    TAHIR (generalized anxiety disorder)       hyoscyamine 0.125 MG tablet    ANASPAZ/LEVSIN    40 tablet    Take 1-2 tablets (125-250 mcg) by mouth every 4 hours as needed for cramping    Abdominal pain, generalized       hypromellose 0.3 % Soln ophthalmic solution    GENTEAL     1 drop every hour as needed        LACTAID PO      Take by mouth daily        lactulose 20 GM/30ML Soln     300 mL    Take 30 mLs by mouth 3 times daily as needed        lidocaine 2 % topical gel    XYLOCAINE     Apply 1 Tube topically daily Reported on 4/21/2017        lidocaine visc 2% 2.5mL/5mL & maalox/mylanta w/ simeth 2.5mL/5mL &  diphenhydrAMINE 5mg/5mL Susp suspension    [facility]    1 Bottle    Swish and swallow 10 mLs in mouth every 6 hours as needed for mouth sores    Behcet's syndrome (H)       linaclotide 290 MCG capsule    LINZESS    90 capsule    Take 1 capsule (290 mcg) by mouth every morning (before breakfast)    Irritable bowel syndrome       LORazepam 1 MG tablet    ATIVAN    90 tablet    Take 0.5 tablets (0.5 mg) by mouth 2 times daily as needed for other (abdominal pain) Do not operate a vehicle after taking this medication    Chronic abdominal pain       meclizine 25 MG tablet    ANTIVERT    30 tablet    Take 1 tablet (25 mg) by mouth every 6 hours as needed for dizziness    Nausea       metaxalone 800 MG tablet    SKELAXIN    30 tablet    Take 0.5 tablets (400 mg) by mouth 3 times daily as needed for moderate pain    Sprain of neck, initial encounter, Cervical dystonia       norgestimate-ethinyl estradiol 0.25-35 MG-MCG per tablet    ORTHO-CYCLEN, SPRINTEC    84 tablet    Take 1 tablet by mouth daily    General counseling for prescription of oral contraceptives       omeprazole 40 MG capsule    priLOSEC    90 capsule    Take 1 capsule (40 mg) by mouth daily    Gastroesophageal reflux disease, esophagitis presence not specified       ondansetron 8 MG ODT tab    ZOFRAN-ODT    60 tablet    Take 1 tablet (8 mg) by mouth every 8 hours as needed for nausea    Non-intractable vomiting with nausea, unspecified vomiting type, Hematemesis, presence of nausea not specified       order for DME     1 Device    Post op shoe    Contusion of right foot, initial encounter       promethazine 25 MG tablet    PHENERGAN    20 tablet    Take 0.5-1 tablets (12.5-25 mg) by mouth every 6 hours as needed for nausea    Intractable chronic migraine without aura and without status migrainosus       sucralfate 1 GM/10ML suspension    CARAFATE    1200 mL    Take 10 mLs (1 g) by mouth 4 times daily    Bile reflux gastritis, Nausea        triamcinolone 0.1 % cream    KENALOG    15 g    Apply sparingly to oral ulcers three times daily for 14 days as needed.    Behcet's syndrome (H)       * Notice:  This list has 6 medication(s) that are the same as other medications prescribed for you. Read the directions carefully, and ask your doctor or other care provider to review them with you.
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
## 2017-09-15 ENCOUNTER — CARE COORDINATION (OUTPATIENT)
Dept: CARE COORDINATION | Facility: CLINIC | Age: 22
End: 2017-09-15

## 2017-09-15 NOTE — PROGRESS NOTES
Clinic Care Coordination Contact  [facility]/Voicemail    Referral Source: PCP  Clinical Data: Care Coordinator Outreach  Outreach attempted x 1.  Left message on voicemail with call back information and requested return call.  Plan:Care Coordinator will continue to work with patient.  ADRIANA Ravi    Care Coordinator  [facility]   [phone]

## 2017-09-19 ENCOUNTER — OFFICE VISIT (OUTPATIENT)
Dept: RHEUMATOLOGY | Facility: CLINIC | Age: 22
End: 2017-09-19
Payer: COMMERCIAL

## 2017-09-19 VITALS
WEIGHT: 143 LBS | BODY MASS INDEX: 25.34 KG/M2 | DIASTOLIC BLOOD PRESSURE: 68 MMHG | TEMPERATURE: 98 F | HEIGHT: 63 IN | OXYGEN SATURATION: 99 % | HEART RATE: 105 BPM | SYSTOLIC BLOOD PRESSURE: 102 MMHG

## 2017-09-19 DIAGNOSIS — G40.89 OTHER SEIZURES (H): ICD-10-CM

## 2017-09-19 DIAGNOSIS — H43.393 VISUAL FLOATERS, BILATERAL: ICD-10-CM

## 2017-09-19 DIAGNOSIS — M35.2 BEHCET'S DISEASE (H): Primary | ICD-10-CM

## 2017-09-19 PROCEDURE — 99214 OFFICE O/P EST MOD 30 MIN: CPT | Performed by: INTERNAL MEDICINE

## 2017-09-19 RX ORDER — METHYLPREDNISOLONE 4 MG
8 TABLET, DOSE PACK ORAL SEE ADMIN INSTRUCTIONS
Qty: 21 TABLET | Refills: 0 | Status: SHIPPED | OUTPATIENT
Start: 2017-09-19 | End: 2017-10-11

## 2017-09-19 ASSESSMENT — ROUTINE ASSESSMENT OF PATIENT INDEX DATA (RAPID3)
TOTAL RAPID3 SCORE: 16.3
RAPID3 INTERPRETATION: HIGH > 12.0

## 2017-09-19 NOTE — PROGRESS NOTES
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 Lilliana Miramontes- complicated migraine  # On Sprintec for birth control   # Borderline transaminase elevation    4/17 Basic blood cell counts, liver and kidney function labs within normal limits except borderline ALT elevation. CRP within normal limits    Meets ISG 1990 criteria for Behcets. Initially, very good response to colchicine which has reduced the intensity and frequency of the oral ulcers in the past. Patient relapsed with genital ulcers and few oral ulcers in the end of 2016 and also with folliculitis in skin. Seen Derm Dr. Elliott. He recommended otezla. Otezla is approved for her now. Slow tapering up because of GI intolerance. Chest pain , mouth sores, hand pain, morning pain were all better when she tried otezla 30mg twice daily. But she cut back to once daily because of GI upset. Currently able to tolerate 30mg twice daily with some nausea. The severity and frequency of oral and genital ulcers have improved on otezla already. When she skipped couple of days of otezla, her symptoms were worse. Continue colchicine 1.2mg in AM and 0.6mg in PM daily. For the past one week, she has flare up of inflammatory arthralgia. We will try a medrol dosepak.   Has genital ulcer in the last week. Referral to gynaecology for an  exam and assessment.     Recently had a pseudo-seizure like episode with up-rolling of eye balls. Referral to neurology made. Patient's mother concerned about neuro-behcets. I would defer to neurology regarding clarifying on the diagnosis of her neurological symptoms.     Has tried prednisone in the past, but does not tolerate well due to mood issues, and has been off prednisone for the past 6+ months. Has H/o Tourettes. Followed by Dr. Miramontes.     She continues to have GI symptoms  Colonoscopy was negative in the past.  Has gastroparesis.  Behcets may have GI involvement but no evidence of GI inflammation at this time. Dr. Baker has evaluated her.   She gets visual symptoms  But there is no evidence of uveitis including posterior uveitis or retinal vasculitis, denies symptoms at today's visit. She has seen Dr. Garcia in the past and also seen Dr. Heaton around 2/16. Patient still getting double vision. Floaters present. I had referred to opthalmology again for reassessment.      Left ankle sprain followed by podiatry - resolved. But recently had right foot injury and tried a wrap.     Mother is concerned about HLA B51 testing. We discussed that this may not change the management at this point. Because the patient has chronic low back pain and morning stiffness, we will check HLA B27 to assess for spondyloarthropathy.     She also likely has fibromyalgia which is uncontrolled. Since patient has medication intolerance, we will address fibromyalgia after optimizing her behcets treatment.     For chest symptoms, she has been referred to pulmonology by GI.     - Continue Triamcinolone acetonide cream (0.1 percent in Or[employer]) three to four times daily during attacks of oral ulcers and be used until pain from the ulcer ceases. Potent topical corticosteroids may be used for genital ulcers. Also use magic mouthwash as needed.  - Other comorbidities to watch for in Behcets: Vascular disease in Behçet s is more common  in men. Large vessel vascular involvement occurs in approximately one-third of patients with Behcet s disease. Pulmonary artery vasculitis can present with hemoptysis (misdiagnosis can lead to poor prognosis). Secondary fibromyalgia, CNS disease, amyloidosis, sacroiliitis.     Data Unavailable    No orders of the defined types were placed in this encounter.      There are no discontinued medications.  Current Outpatient Prescriptions   Medication Sig Dispense Refill     ONABOTULINUMTOXINA IJ Inject 165 Units as directed once Lot# /C3  Expiration: 04/2020       Colchicine 0.6 MG CAPS Take 0.6 mg by mouth See Admin Instructions 2 capsules in AM and 1 capsule in PM 90 capsule 3     order for DME Post op shoe 1 Device 0     hydrOXYzine (ATARAX) 25 MG tablet Take 1-2 tablets (25-50 mg) by mouth every 6 hours as needed for anxiety 60 tablet 1     busPIRone 30 MG TABS Take 15 mg by mouth 2 times daily Increased dose. For anxiety. 60 tablet 1     diphenhydrAMINE (BENADRYL) 25 MG tablet Take 1 tablet (25 mg) by mouth every 8 hours as needed for allergies 30 tablet 0     linaclotide (LINZESS) 290 MCG capsule Take 1 capsule (290 mcg) by mouth every morning (before breakfast) 90 capsule 1     LORazepam (ATIVAN) 1 MG tablet Take 0.5 tablets (0.5 mg) by mouth 2 times daily as needed for other (abdominal pain) Do not operate a vehicle after taking this medication 90 tablet 0     ONABOTULINUMTOXINA IJ Inject 165 Units as directed once Lot # /C3  Expiration Date: 01/2020       guanFACINE (TENEX) 1 MG tablet Take 3 tablets (3 mg) by mouth twice daily in the morning and evening daily. 180 tablet 3     lactulose 20 GM/30ML SOLN Take 30 mLs by mouth 3 times daily as needed 300 mL 0     sucralfate (CARAFATE) 1 GM/10ML suspension Take 10 mLs (1 g) by mouth 4 times daily 1200 mL 2     diclofenac (VOLTAREN) 1 % GEL topical gel Apply 2-4 grams to affected area(s) up to 4 times per day as needed. This is an anti-inflammatory  medication. 100 g 4     metaxalone (SKELAXIN) 800 MG tablet Take 0.5 tablets (400 mg) by mouth 3 times daily as needed for moderate pain 30 tablet 1     ondansetron (ZOFRAN-ODT) 8 MG ODT tab Take 1 tablet (8 mg) by mouth every 8 hours as needed for nausea 60 tablet 11     aspirin (ASPIRIN CHILDRENS) 81 MG chewable tablet Take 81 mg by mouth as needed        dicyclomine (BENTYL) 20 MG tablet Take 1 tablet (20 mg) by mouth 4 times daily as needed 60 tablet 1     amitriptyline (ELAVIL) 25 MG tablet Take 1 tablet (25 mg) by mouth At Bedtime (Patient taking differently: Take 50 mg by mouth At Bedtime ) 45 tablet 5     omeprazole (PRILOSEC) 40 MG capsule Take 1 capsule (40 mg) by mouth daily 90 capsule 3     EPINEPHrine (EPIPEN 2-EAMON) 0.3 MG/0.3ML injection Inject 0.3 mLs (0.3 mg) into the muscle as needed for anaphylaxis 0.6 mL 3     apremilast (OTEZLA) 30 MG tablet 20 mg in AM and 30mg in PM daily X 2 weeks and then 30mg twice daily. 60 tablet 3     OnabotulinumtoxinA (BOTOX IJ) Inject 162.5 Units as directed once Lot #: /C3  Exp: 10/2019       norgestimate-ethinyl estradiol (ORTHO-CYCLEN, SPRINTEC) 0.25-35 MG-MCG per tablet Take 1 tablet by mouth daily 84 tablet 3     albuterol (PROAIR HFA/PROVENTIL HFA/VENTOLIN HFA) 108 (90 BASE) MCG/ACT Inhaler Inhale 2 puffs into the lungs every 6 hours as needed for shortness of breath / dyspnea or wheezing 1 Inhaler 1     OnabotulinumtoxinA (BOTOX IJ) Inject 150 Units into the muscle Lot # /C3  Exp: 8/2019       promethazine (PHENERGAN) 25 MG tablet Take 0.5-1 tablets (12.5-25 mg) by mouth every 6 hours as needed for nausea 20 tablet 1     meclizine (ANTIVERT) 25 MG tablet Take 1 tablet (25 mg) by mouth every 6 hours as needed for dizziness 30 tablet 1     Magic Mouthwash (FV std formula) lidocaine visc 2% 2.5mL/5mL & maalox/mylanta w/ simeth 2.5mL/5mL & diphenhydrAMINE 5mg/5mL Swish and swallow 10 mLs in mouth every 6 hours as needed for mouth sores 1 Bottle 1      clobetasol (TEMOVATE) 0.05 % cream Apply topically 2 times daily 60 g 0     OnabotulinumtoxinA (BOTOX IJ) Inject 150 Units into the muscle once Lot: /C3 Exp: 05/2019       botulinum toxin type A (BOTOX) 100 UNITS injection Inject 200 Units into the muscle every 3 months 200 Units 3     hyoscyamine (ANASPAZ,LEVSIN) 0.125 MG tablet Take 1-2 tablets (125-250 mcg) by mouth every 4 hours as needed for cramping 40 tablet 1     Aspirin-Acetaminophen-Caffeine (EXCEDRIN MIGRAINE PO) Take by mouth as needed        hypromellose (GENTEAL) 0.3 % SOLN 1 drop every hour as needed       betamethasone valerate (VALISONE) 0.1 % cream Apply topically 2 times daily       carbamide peroxide (DEBROX) 6.5 % otic solution 5 drops 2 times daily       Lactase (LACTAID PO) Take by mouth daily       lidocaine (XYLOCAINE) 2 % jelly Apply 1 Tube topically daily Reported on 4/21/2017       triamcinolone (KENALOG) 0.1 % cream Apply sparingly to oral ulcers three times daily for 14 days as needed. 15 g 1     Austen Marquez MD.           Active Problem List:     Patient Active Problem List    Diagnosis Date Noted     Chronic pain syndrome 07/27/2017     Priority: Medium     Major depressive disorder, recurrent episode, moderate (H) 06/27/2017     Priority: Medium     Cervical pain 05/02/2017     Priority: Medium     Acute left ankle pain 03/31/2017     Priority: Medium     Cervical dystonia 03/28/2017     Priority: Medium     PTSD (post-traumatic stress disorder) 01/17/2017     Priority: Medium     Left knee pain 10/20/2016     Priority: Medium     Patellofemoral instability 10/20/2016     Priority: Medium     Fibromyalgia 08/04/2016     Priority: Medium     Rheumatoid arthritis of multiple sites without rheumatoid factor (H) 08/04/2016     Priority: Medium     Raynaud's disease without gangrene 08/04/2016     Priority: Medium     Chronic abdominal pain 08/04/2016     Priority: Medium     Palpitations 01/12/2016     Priority: Medium      On colchicine therapy 10/30/2015     Priority: Medium     Spell of shaking 05/06/2015     Priority: Medium     Migraine 02/04/2015     Priority: Medium     Problem list name updated by automated process. Provider to review       Behcet's disease (H) 12/10/2014     Priority: Medium     Headaches due to old head injury 11/12/2013     Priority: Medium     Milk protein intolerance 10/11/2013     Priority: Medium     Concussion 02/13/2013     Priority: Medium     Jan 2013, with prolonged recovery- followed by sports med         Knee pain 01/03/2013     Priority: Medium     TAHIR (generalized anxiety disorder) 06/25/2009     Priority: Medium     Tics - Tourette syndrome 05/18/2009     Priority: Medium     Followed by psychotherapy. Symptoms well managed. Originally diagnosed at [facility] neurolog[facility]. (Dr. Simpson)           IBS (irritable bowel syndrome) 05/18/2009     Priority: Medium     Seasonal allergic rhinitis 05/18/2009     Priority: Medium          History of Present Illness:     Chief Complaint   Patient presents with     RECHECK     Seen Dr. Marilyn Moran Rheumatology in [facility] [place] 10/14:    Original presentation 2014:    Ms Oropeza is a 19 y.o. female who presents with one year of presentation of painful oral ulcers (monthly) once that have worsened with two episodes in the last two months that presented with painful vulvar or vaginal ulcers (2 episodes so far every month) [not sure if they leave scar] as well that timing coincides with menses ([facility] [place]: 8/25/14).   ORAL ULCERS: last two episodes were severe, painful, white base with erythematous halo, that appear and disappear in the matter of 1-2 weeks without, does not bleed and doesn't respond to any treatment used (magic mouthwash), 10-15 in number with the biggest one being dime size.     VAGINAL ULCERS: 2-3 in number between labia majora and minora that occur simultaneously with oral ulcers, painful, non bleeding ulcers that are erythematous, no pus.     FOLLICULITIS:  patient reports having spots in legs specially around ankle and knee, but arms, and neck are affected as well. Small lesions that resemble ingrown hair, most are red erythematous elevated lesions, well demarcated, some with liquid that patient refers as pimple like.     SKIN RASHES: welts and red macules with well defined borders that are pruritic and non-ulcerative on chest, arms and back. Benadryl relieves.     ARTHRALGIAS: In descending order of severity: hips, knees, wrists, shoulders, neck, lumbar, fingers.   C/o morning stiffness in hips, back and neck lasting for about until noon.     Ms. Oropeza reports they present in severe flares and never fully resolve, but do get better. She has seen some swelling and warmth on the affected joints but has not noticed and redness. Has tried Tylenol, ibuprofen, and hydrocodone with not much benefit.     FEVERS: Reports 3-4 sporadic episodes per month of self limited fevers of 38 C that occur during the evenings and associate facial flushing.     HEADACHES: occur daily and are bitemporal and irradiate occipitally, pulsatile in nature, intensity varies from intense to mild, are worsened with movement. On treatment with ibuprofen and somatriptan without any positive results.     VISION CHANGES: Patient reports that suddenly she would only see a gray blotch in her right eyes and would persist like this for a day and a half. Also reports blurriness in her left eye. Presence of pain and burning sensation of eyeball with associated redness. Has changed prescription glasses in the matter of 2 years 3 times. She has had opthalmology exam and was not suggestive of any evidence of uveitis.   She was also evaluated in ED for a dizzy spell.     ABD PAIN W/ INTERMITTENT DIARRHEA AND CONSTIPATION: Patient reports abdominal pain that is intermittent, periods of 7 days without bowel movement and feeling bloated with change of pattern to diarrhea (liquidy without blood nor mucus, non foul  smelling). Has taken Bentyl, Zegrid, and rinetidine with mild clinical improvement.  She also reports small red nodules after each needle stick for blood draw.   Neurology saw her back then and was not impressed with her presentation as physical examination was normal. Also MRI brain normal: Findings: No definite hemorrhage, mass affect, midline shift, or ventriculomegaly is noted.There are a few scattered non specific white matter T2 hyperintensities. Axial diffusion weighted images are unremarkable.     The major vascular structures appear patent. There is opacification and severe mucosal thickening in the right maxillary sinus. The remaining paranasal sinuses, and mastoid air cells are clear. Orbits are unremarkable  She has + HSV-1 IGG. Seen ID. Negative for Herpes simplex virus culture. HSV IGM I/II COMBINATION SENT TO [facility]  RESULTS = <0.91  REF RANGE: <0.91 = NEGATIVE  ID did not think HSV could explain her mouth and genital sores.     CRP within normal limits. HIV negative. CBC within normal limits; UA negative. Creatinine within normal limits   CYNDIE neg.  Antigliadin neg.   ANti TTG neg.   [facility] 2014: Colonoscopy and endoscopy neg; result not available. Not sure if biopsies were done.   Raynaud's phenomenon:  Onset: about 2013  Digits: all fingers  Digital ulcers: no  Color changes: white ---> purple and red  Duration of an attack: About couple of hours per mom  Pain during attack: not clear pain but bruise like feeling  Frequency of attacks: few times a month  Family history of Raynauds: no  GERD: very long time    No hemoptysis.   No miscarriages/ no thrombosis in past.   No family or personal history of psoriasis, ulcerative colitis or chron's disease.   No buttock pain or low back pain or stiffness in AM    Interim history:  Dec 2014- Multiple mouth ulcers and did not eat for 5 days. This coincided with periods. Colchicine 0.6mg PO BID started in Nov 2014.   Prednisone taper in Dec 20mg to 0 in 4  weeks. She also used magic mouthwash. Kenalog cream. After going to the ER, 3 days later her ulcers were better. She thinks it improved after the menstruation stopped.   LMP 3 weeks ago.   COLCHICINE HAS HELPED A LOT REDUCING THE INTENSITY OF MENSTRUATION ASSOCIATED ORAL AND VULVAR ULCERS.     Interim history: 6/15    Since last visit only two episodes of mouth sores- small sized 6   No genital ulcers since last visit.   Colchicine 1.2 mg in AM and 0.6mg in PM keeps her symptoms under control.     Admitted in 5/15:  Admission Diagnoses:  - LUE weakness  - spell  - hx of migraines  - hx of Tourette syndrome  - hx of Behcet's disease    Discharge Diagnoses:   - spell likely 2/2 anxiety  - hx of migraines  - hx of Tourette syndrome  - hx of Behcet's disease    CT and MRI brain was normal.     Seeing Dr. Lilliana Miramontes soon as outpatient.     Dr. Garcia did not find any intraocular inflammation.      Interm 10/30/2015  ED for migraine. Continues to see neuro - MRI brain without lesions noted. Saw GI - upper barium normal. May be considering repeat colo. Worsening lesions in mouth and genitals. Has had continuous lesion in mouth x 2 months. 2 genital ulcers. Had fracture of right sesamoid in foot. Continues to have low grade fevers. New folliculitis on chest, legs and arms.     Interim hx: 1/11/2016    Pt states that since last visit she continues to have oral ulcers and has noted more folliculitis-like lesions on her lower extremities.  She states that on her right lower leg she had a red macular spot that has since resolved.  She denies uveitis.  She states that the colchicine initially helped but then stopped working.  She takes 0.6 mg TID.  She stopped taking her prednisone last week as she feels like this hasn't helped.  She hasn't had any episodes of vaginal ulcers.      She saw GI who stated she has gastroparesis.  She is seeing Cardiology later this week for possible syncopal episodes.      Interim history  "5/16  Mouth ulcers X 1 last month  Genital ulcers - none since last visit.     Bilateral MCPs pain, knee pain and low back pain +ve increased since last visit  Morning stiffness X 2 hours (increasing)   5-6/10    \"overall myalgia\" has gotten worse    C/o pseudofolliculitis lesion on anterior shins bilaterally worse    Interim history: (7/18/2016)  Patient has just started Humira for 2 doses on 7/1 and 7/15 and reported minimal improvement of her hip pain but otherwise, did not notice anything different.     She reported painful mouth ulcer once a month since last visit but noticed that it has been getting deeper.   Denied any genital ulcers.    Still has ongoing bilateral MCPs pain, knee pain but this has been intermittent and she did not have any much pain today. She reported 2-3 hours morning stiffness of both hands and pain score of 6/10 at her knees and 8/10 of her hands but currently takes no pain medication except prn ativan which she takes when her muscle is really tight.     The only concern is that she gained weight even though she has not been eating much (eat once a day) and do exercises every day for 1 hour (with video of yoga, pilates). Her mother wonders if she needs to have some labs work up include sex hormone, thyroid, cortisol.    Interval history 9/14/16  Patient was started Humira at the last visit, patient reports worsening of skin ulcers since starting Humira and stopped taking Humura for the past 2 weeks. Patient reports oral and genital ulcers has been stable even before starting Humira and has not had any interval oral/genital ulcers. However she reports recurrent skin ulcers occurring on a daily basis that affects her chest and anterior legs. Patient also has stopped taking prednisone, she reports that she doesn't feel the prednisone helps, her last dose of prednisone was 6+ months ago. Patient also report worsening low back pain that sometimes causes her to limp.    In the interval patient " also visited ED on 8/31/16 for left hand pain and what the patient describes as phlebitis, no medication or procedure was done and the patient was discharged with a splint. Patient also reported 1 episode of chest pressure that was associated with dyspnea and numbness of the left arm, she was shopping in a supermarket at the time of onset, and the pressure resolved after she took a nap.    Patient denies any infectious symptoms in the interval, no fever, chills, night sweat, cough, dysuria, denies eye pain or visual changes.    November 4, 2016  Oct - had one genital ulcer  Oral ulcers X 5- around menstruation time.   Knee pain- chronic on the left side  Dizziness spells - on and off; been to the ER for that in the past.   On and off migraines  July 2013 - s/p MPFL reconstruction plus LRL 7/2013  Morning stiffness in knee (left) X 1 hour    Severe jaw pain and chest pain- patient wondering if this is Tourette's or esophageal spasms. Cardiac workup negative.   Fever     January 13, 2017  Yesterday in ER [facility] [place] with orthostatic syncope from dehydration.   Chest pain on and off still continues. Painful with deep breaths.   Oral ulcers - few minor ones lasting for one week;   One major one in roof of mouth lasting for about one week.   Knee pain +ve - reviewed the recent MRI with her orthopedic surgeon.   On and off migraines  otezla is approved. Still waiting to receive the meds.     March 31, 2017  Otezla current dose 15mg PO BID.   She is tolerating okay at this dose and is willing to increase the dose further up to 30mg BID.   Her joint pains are better on otezla. Knee pain is also better.   Back and neck pain +ve 8/10 when it is worse. Intramuscular botox injections were given on Monday in PMR.   2 minor genital ulcers in the interim- resolved.   Few oral ulcers in the interim- resolved.   Nasal ulcer - resolved.   Migraines on and off.   Nausea with otezla but improving.   Dizziness and blackouts on and off. She  fell once and hurt her ankle. Wearing boot in left leg.   Complete ROS negative except for above    May 17, 2017  Tolerating otezla better. Not throwing up anymore. Current dose 20mg in AM and 30mg in PM (2nd week).   Patient thinks that her oral ulcers frequency and severity are improving with otezla. Also no genital ulcers in the interim.   Currently on doxycycline for bronchitis/?pneunomia. 4 more days left.     Joint pain history  2 weeks ago/ dx with pneumonia 1 week ago/  Involved joints: all over  Pain scale: 6.5/10   Wakes the patient from sleep : Yes  Morning stiffness: Yes for 120 minutes  Meds used:otezla,colchicine     Interim history  Since last visit:  1. Infections - Yes/ pneumonia  2. New symptoms/medical problem - Yes/ thinks she may have had a mini-seizure about 10 days ago ; did not seek medical attention; did not reoccur after that. Patient seeing PCP today.  3. Any side effects from Rheum medications -vomiting a lot (resolved)  3. ER visits/Hospitalizations/surgeries - Yes  4. Last PCP visit: has an apt. today    Patient still getting double vision. Floaters present.     Therapist recommended psychiatry evaluation. Patient does not want to see psychaitry. Patient will see PCP today.     August 22, 2017  Have you ever seen a rheumatologist Yes Who You When 5/17/17    NO genital ulcers in the interim.   Mouth ulcers- three in the back of the throat and roof of the mouth last month.     Joint pain history  Onset: doing alittle worse / cut her otezla back to 30mg  Daily due to GI problems  Involved joints: all over her body. Been having more black out episodes, been having more chest pains.also has raised bumps on both legs from the knees down that itch and are painful   Pain scale:  5.5/10     Wakes the patient from sleep : Yes  Morning stiffness:Yes for 120 minutes  Meds used:otezla, colchicine (three pills daily)     Interim history  Since last visit:  1. Infections - Yes stomach issue  2. New  symptoms/medical problem - No  3. Any side effects from Rheum medications -nausea from otezla  3. ER visits/Hospitalizations/surgeries - Yes, ER for foot, ankle injury from passing out and fell down the steps. Hit her head another time from passing out. Alcohol poisoning in July 4. Last PCP visit: 6/23/17 September 19, 2017  Have you ever seen a rheumatologist Yes Who You When 8/22/17  Joint pain history  Onset: pt states that she hurts everywhere for 6 days  Involved joints: all joints  Pain scale:  7/10     Wakes the patient from sleep : Yes/ not sleeping at all  Morning stiffness:Yes for 180 minutes  Meds used:colchicine, otezla, magic mouth wash, lidocaine gel  Last one week: increased joint pain; and genital ulcer  Genital lesion (dimed size) in the last one week  After botox a week ago, she had fever 101 for three days; near syncopes. Back pain; floaters  Chest pain , mouth sores, hand pain, morning pain were all better with otezla 30mg twice daily.    Interim history  Since last visit:  1. Infections - No  2. New symptoms/medical problem - No  3. Any side effects from Rheum medications -nausea from the otezla  3. ER visits/Hospitalizations/surgeries - No  4. Last PCP visit: may 2017          Past Medical History:     Past Medical History:   Diagnosis Date     Anxiety      Arthritis      Behcet's disease (H)      Cervical adenitis May 2010     Chronic abdominal pain      Constipation, chronic 1994     Gastro-oesophageal reflux disease      Gastroparesis      Migraines      Neuromuscular disorder (H)     fibramyalgia     Palpitations      Seizure (H)      Seizures (H)     unknown etiology     Syncope      Tourette's      Past Surgical History:   Procedure Laterality Date     ARTHROSCOPY KNEE WITH PATELLAR REALIGNMENT  7/25/2013    Procedure: ARTHROSCOPY KNEE WITH PATELLAR REALIGNMENT;  Left Knee Arthroscopy, Medial Patellofemoral Ligament Reconstruction with Allograft  ;  Surgeon: Jennifer Acevedo MD;   Location: [facility]     DENTAL SURGERY  1996    Teeth removal     ENDOSCOPY UPPER, COLONOSCOPY, COMBINED  2005     HC ESOPH/GAS REFLUX TEST W NASAL IMPED >1 HR N/A 2/15/2017    Procedure: ESOPHAGEAL IMPEDENCE FUNCTION TEST WITH 24 HOUR PH GREATER THAN 1 HOUR;  Surgeon: Timothy Matta MD;  Location:  GI          Social History:     Social History     Occupational History     Not on file.     Social History Main Topics     Smoking status: Never Smoker     Smokeless tobacco: Never Used     Alcohol use Yes      Comment: seldom     Drug use: No     Sexual activity: No          Family History:     Family History   Problem Relation Age of Onset     Depression Mother      Neurologic Disorder Mother      Migraines, take imitrex injection.  Also in maternal grandmother.       Alcohol/Drug Father      Hypertension Father      Depression Father      Cardiovascular Maternal Grandmother      Depression Maternal Grandmother      Hypertension Maternal Grandmother      Alzheimer Disease Maternal Grandmother      Cardiovascular Maternal Grandfather      Hypertension Maternal Grandfather      Depression Maternal Grandfather      Alcohol/Drug Maternal Grandfather      Cardiovascular Paternal Grandmother      Hypertension Paternal Grandmother      Cardiovascular Paternal Grandfather      Hypertension Paternal Grandfather      Glaucoma No family hx of      Macular Degeneration No family hx of           Allergies:     Allergies   Allergen Reactions     Amoxil [Penicillins] Rash     Dad unsure of reaction.     Bee Venom Anaphylaxis     Contrast Dye Rash     Contrast Media Ready-Box [facility] [place], 04/09/2014.; Contrast Media Ready-Box [facility] [place], 04/09/2014.  NOTE: this is a contrast media oral with iodine. Premedicate with methylpred standard for IV contrast, request barium contrast for oral contrast.     Kiwi Swelling     Orange Fruit [Citrus] Anaphylaxis     Pineapple Anaphylaxis, Difficulty breathing and Rash     Milk Protein Extract Hives      Reglan [Metoclopramide] Other (See Comments)     IV dose only, in ER, rapid heart rate.     Ace Inhibitors      Difficulty in breathing and GI upset     Amitiza [Lubiprostone] Nausea and Vomiting     Amoxicillin-Pot Clavulanate      Latex      Midazolam Unknown     parent states that when pt takes this medication, she wakes up being very violent .     Nuts      Contrast Media Ready-Box [facility] [place], 04/09/2014.; Contrast Media Ready-Box [facility] [place], 04/09/2014.       Versed      Coming out of pelvic exam at age of 5, was kicking and screaming when coming out of the versed.     Adhesive Tape Rash     Azithromycin Hives and Rash     Cephalexin Itching and Rash     Itchy mouth     Keflex [Cephalexin-Fd&C Yellow #6] Hives          Medications:     Current Outpatient Prescriptions   Medication Sig Dispense Refill     ONABOTULINUMTOXINA IJ Inject 165 Units as directed once Lot# /C3  Expiration: 04/2020       Colchicine 0.6 MG CAPS Take 0.6 mg by mouth See Admin Instructions 2 capsules in AM and 1 capsule in PM 90 capsule 3     order for DME Post op shoe 1 Device 0     hydrOXYzine (ATARAX) 25 MG tablet Take 1-2 tablets (25-50 mg) by mouth every 6 hours as needed for anxiety 60 tablet 1     busPIRone 30 MG TABS Take 15 mg by mouth 2 times daily Increased dose. For anxiety. 60 tablet 1     diphenhydrAMINE (BENADRYL) 25 MG tablet Take 1 tablet (25 mg) by mouth every 8 hours as needed for allergies 30 tablet 0     linaclotide (LINZESS) 290 MCG capsule Take 1 capsule (290 mcg) by mouth every morning (before breakfast) 90 capsule 1     LORazepam (ATIVAN) 1 MG tablet Take 0.5 tablets (0.5 mg) by mouth 2 times daily as needed for other (abdominal pain) Do not operate a vehicle after taking this medication 90 tablet 0     ONABOTULINUMTOXINA IJ Inject 165 Units as directed once Lot # /C3  Expiration Date: 01/2020       guanFACINE (TENEX) 1 MG tablet Take 3 tablets (3 mg) by mouth twice daily in the morning and evening daily. 180 tablet 3      lactulose 20 GM/30ML SOLN Take 30 mLs by mouth 3 times daily as needed 300 mL 0     sucralfate (CARAFATE) 1 GM/10ML suspension Take 10 mLs (1 g) by mouth 4 times daily 1200 mL 2     diclofenac (VOLTAREN) 1 % GEL topical gel Apply 2-4 grams to affected area(s) up to 4 times per day as needed. This is an anti-inflammatory medication. 100 g 4     metaxalone (SKELAXIN) 800 MG tablet Take 0.5 tablets (400 mg) by mouth 3 times daily as needed for moderate pain 30 tablet 1     ondansetron (ZOFRAN-ODT) 8 MG ODT tab Take 1 tablet (8 mg) by mouth every 8 hours as needed for nausea 60 tablet 11     aspirin (ASPIRIN CHILDRENS) 81 MG chewable tablet Take 81 mg by mouth as needed        dicyclomine (BENTYL) 20 MG tablet Take 1 tablet (20 mg) by mouth 4 times daily as needed 60 tablet 1     amitriptyline (ELAVIL) 25 MG tablet Take 1 tablet (25 mg) by mouth At Bedtime (Patient taking differently: Take 50 mg by mouth At Bedtime ) 45 tablet 5     omeprazole (PRILOSEC) 40 MG capsule Take 1 capsule (40 mg) by mouth daily 90 capsule 3     EPINEPHrine (EPIPEN 2-EAMON) 0.3 MG/0.3ML injection Inject 0.3 mLs (0.3 mg) into the muscle as needed for anaphylaxis 0.6 mL 3     apremilast (OTEZLA) 30 MG tablet 20 mg in AM and 30mg in PM daily X 2 weeks and then 30mg twice daily. 60 tablet 3     OnabotulinumtoxinA (BOTOX IJ) Inject 162.5 Units as directed once Lot #: /C3  Exp: 10/2019       norgestimate-ethinyl estradiol (ORTHO-CYCLEN, SPRINTEC) 0.25-35 MG-MCG per tablet Take 1 tablet by mouth daily 84 tablet 3     albuterol (PROAIR HFA/PROVENTIL HFA/VENTOLIN HFA) 108 (90 BASE) MCG/ACT Inhaler Inhale 2 puffs into the lungs every 6 hours as needed for shortness of breath / dyspnea or wheezing 1 Inhaler 1     OnabotulinumtoxinA (BOTOX IJ) Inject 150 Units into the muscle Lot # /C3  Exp: 8/2019       promethazine (PHENERGAN) 25 MG tablet Take 0.5-1 tablets (12.5-25 mg) by mouth every 6 hours as needed for nausea 20 tablet 1     meclizine  (ANTIVERT) 25 MG tablet Take 1 tablet (25 mg) by mouth every 6 hours as needed for dizziness 30 tablet 1     Magic Mouthwash (FV std formula) lidocaine visc 2% 2.5mL/5mL & maalox/mylanta w/ simeth 2.5mL/5mL & diphenhydrAMINE 5mg/5mL Swish and swallow 10 mLs in mouth every 6 hours as needed for mouth sores 1 Bottle 1     clobetasol (TEMOVATE) 0.05 % cream Apply topically 2 times daily 60 g 0     OnabotulinumtoxinA (BOTOX IJ) Inject 150 Units into the muscle once Lot: /C3 Exp: 05/2019       botulinum toxin type A (BOTOX) 100 UNITS injection Inject 200 Units into the muscle every 3 months 200 Units 3     hyoscyamine (ANASPAZ,LEVSIN) 0.125 MG tablet Take 1-2 tablets (125-250 mcg) by mouth every 4 hours as needed for cramping 40 tablet 1     Aspirin-Acetaminophen-Caffeine (EXCEDRIN MIGRAINE PO) Take by mouth as needed        hypromellose (GENTEAL) 0.3 % SOLN 1 drop every hour as needed       betamethasone valerate (VALISONE) 0.1 % cream Apply topically 2 times daily       carbamide peroxide (DEBROX) 6.5 % otic solution 5 drops 2 times daily       Lactase (LACTAID PO) Take by mouth daily       lidocaine (XYLOCAINE) 2 % jelly Apply 1 Tube topically daily Reported on 4/21/2017       triamcinolone (KENALOG) 0.1 % cream Apply sparingly to oral ulcers three times daily for 14 days as needed. 15 g 1          Physical Exam:   not currently breastfeeding.  Wt Readings from Last 4 Encounters:   09/13/17 64.9 kg (143 lb)   08/31/17 66.2 kg (146 lb)   08/22/17 65.8 kg (145 lb)   08/20/17 65.8 kg (145 lb)     Constitutional: well-developed, appearing stated age; cooperative  Eyes: nl EOM, PERRLA, vision, conjunctiva, sclera  ENT: No oral ulcer seen.   nl external ears, nose, hearing, lips, teeth, gums, throat  No mucous membrane lesions, normal saliva pool  Neck: no mass or thyroid enlargement  Resp: lungs clear to auscultation,   CV: RRR, no murmurs, rubs or gallops, no edema  GI: no ABD mass or tenderness, no HSM  : not  tested  Lymph: no cervical, supraclavicular or epitrochlear nodes  MS: Right foot wrapped and wearing a boot.   All shoulder, elbow, wrist, MCP/PIP/DIP, hip, ankle joints were examined and  found normal except tenderness on even light touch of both knees with minimal swelling but no warmth. No active synovitis or deformity. Full ROM.  Normal  strength. Fist 100%.  No dactylitis,  tenosynovitis, enthesopathy.  Skin: no nail pitting, alopecia, rash  Psych: nl judgement, orientation, memory, affect.         Data:     CBC RESULTS:   Recent Labs   Lab Test  06/06/17 2048   WBC  4.7   RBC  4.09   HGB  12.0   HCT  35.9   MCV  88   MCH  29.3   MCHC  33.4   RDW  13.1   PLT  189       Liver Function Studies -   Recent Labs   Lab Test  06/06/17 2048   PROTTOTAL  6.7*   ALBUMIN  3.8   BILITOTAL  0.3   ALKPHOS  91   AST  26   ALT  44       Creatinine   Date Value Ref Range Status   06/06/2017 0.77 0.52 - 1.04 mg/dL Final   ]    No results found for: URIC]    HLA-B27  No results for input(s): [id number], B1 in the last 89187 hours.  RF/CCP  Recent Labs   Lab Test  05/06/16   1554   CCPIGG  1   RHF  <20     CYNDIE/RNP/Sm/SSA/SSB  Recent Labs   Lab Test  11/01/16   1318   TREPAB  Negative     ESR/CRP  Recent Labs   Lab Test  04/21/17   1249  04/14/17   1351  11/06/16   1341  03/11/16   1350  11/18/15   1453   SED   --    --   4  5  6   CRP  <2.9  <2.9  <2.9  <2.9  <2.9

## 2017-09-19 NOTE — NURSING NOTE
"Chief Complaint   Patient presents with     RECHECK       Initial /68 (BP Location: Left arm, Patient Position: Chair, Cuff Size: Adult Regular)  Pulse 105  Temp 98  F (36.7  C) (Oral)  Ht 1.6 m (5' 3\")  Wt 64.9 kg (143 lb)  SpO2 99%  BMI 25.33 kg/m2 Estimated body mass index is 25.33 kg/(m^2) as calculated from the following:    Height as of this encounter: 1.6 m (5' 3\").    Weight as of this encounter: 64.9 kg (143 lb).  Medication Reconciliation: complete    Have you ever seen a rheumatologist Yes Who You When 8/22/17  Joint pain history  Onset: pt states that she hurts everywhere for 6 days  Involved joints: all joints  Pain scale:  7/10     Wakes the patient from sleep : Yes/ not sleeping at all  Morning stiffness:Yes for 180 minutes  Meds used:colchicine, otezla, magic mouth wash, lidocaine gel    Interim history  Since last visit:  1. Infections - No  2. New symptoms/medical problem - No  3. Any side effects from Rheum medications -nausea from the otezla  3. ER visits/Hospitalizations/surgeries - No  4. Last PCP visit: may 2017  Wt Readings from Last 4 Encounters:   09/19/17 64.9 kg (143 lb)   09/13/17 64.9 kg (143 lb)   08/31/17 66.2 kg (146 lb)   08/22/17 65.8 kg (145 lb)     BP Readings from Last 3 Encounters:   09/19/17 102/68   09/13/17 109/65   08/31/17 94/62       "

## 2017-09-21 ENCOUNTER — PRE VISIT (OUTPATIENT)
Dept: NEUROLOGY | Facility: CLINIC | Age: 22
End: 2017-09-21

## 2017-09-21 ENCOUNTER — OFFICE VISIT (OUTPATIENT)
Dept: OPHTHALMOLOGY | Facility: CLINIC | Age: 22
End: 2017-09-21
Attending: OPHTHALMOLOGY
Payer: COMMERCIAL

## 2017-09-21 DIAGNOSIS — M35.2 BEHCET'S DISEASE (H): Primary | ICD-10-CM

## 2017-09-21 DIAGNOSIS — H04.123 DRY EYES, BILATERAL: ICD-10-CM

## 2017-09-21 PROCEDURE — 99212 OFFICE O/P EST SF 10 MIN: CPT | Mod: ZF

## 2017-09-21 ASSESSMENT — TONOMETRY
IOP_METHOD: TONOPEN
OD_IOP_MMHG: 16
OS_IOP_MMHG: 21

## 2017-09-21 ASSESSMENT — EXTERNAL EXAM - RIGHT EYE: OD_EXAM: NORMAL

## 2017-09-21 ASSESSMENT — VISUAL ACUITY
METHOD: SNELLEN - LINEAR
OS_CC: 20/20
OD_CC: 20/20
CORRECTION_TYPE: GLASSES
OD_CC+: -1

## 2017-09-21 ASSESSMENT — REFRACTION_WEARINGRX
OS_CYLINDER: SPHERE
OS_SPHERE: -1.75
OD_CYLINDER: SPHERE
OD_SPHERE: -2.00

## 2017-09-21 ASSESSMENT — CUP TO DISC RATIO
OD_RATIO: 0.35
OS_RATIO: 0.35

## 2017-09-21 ASSESSMENT — EXTERNAL EXAM - LEFT EYE: OS_EXAM: NORMAL

## 2017-09-21 ASSESSMENT — CONF VISUAL FIELD
OD_INFERIOR_TEMPORAL_RESTRICTION: 3
OS_INFERIOR_NASAL_RESTRICTION: 3

## 2017-09-21 ASSESSMENT — SLIT LAMP EXAM - LIDS
COMMENTS: NORMAL
COMMENTS: NORMAL

## 2017-09-21 NOTE — MR AVS SNAPSHOT
After Visit Summary   9/21/2017    Samara Oropeza    MRN: [id number]           Patient Information     Date Of Birth          1995        Visit Information        Provider Department      9/21/2017 9:15 AM [facility]        Today's Diagnoses     Behcet's disease (HCC) - Both Eyes    -  1    Dry eyes, bilateral           Follow-ups after your visit        Follow-up notes from your care team     Return in about 1 year (around 9/21/2018), or behcet's disease uveitis.      Your next 10 appointments already scheduled     Sep 22, 2017  2:50 PM CDT   LUIZ Extremity with Jennifer Alvarenga PT   [facility] ([facility] [place])    [address] 5th Floor  [facility] [id number] [phone]            Sep 26, 2017  3:15 PM CDT   Return Visit with Emily Rollins, TAINA   [facility] ([facility])    [address]  [facility] [id number] [phone]            Sep 27, 2017  8:30 AM CDT   Return Visit with EVI Dahl CNP   [facility] ([facility])    [address]  [facility] [phone] [phone]            Sep 27, 2017 11:00 AM CDT   Return Visit with Kimo Hudson LP   [facility] ([facility])    [address] [address] [phone]            Oct 03, 2017  3:00 PM CDT   (Arrive by 2:45 PM)   New Seizure with Sigifredo Flores MD   [facility] ([facility] [place])    [address]  3rd [facility] [id number] [phone]            Oct 09, 2017  2:15 PM CDT   Return Visit with Cata Hurst NP   [facility] ([facility])    [address]  [address]  [facility] [address] [phone]            Oct 10, 2017 11:45 AM CDT   Return Visit with  Emily Rollins, TAINA   [facility] ([facility])    [address]  [facility] [address] [phone]            Oct 11, 2017  1:00 PM CDT   (Arrive by 12:45 PM)   Return Visit with EVI Vizcaino CNP   [facility] ([facility])    [address]  4th Floor  [facility] [id number] [phone]            Oct 18, 2017 11:30 AM CDT   Return Visit with Austen Marquez MD   [facility]    [address] [id number] [phone]            Dec 06, 2017  3:00 PM CST   (Arrive by 2:45 PM)   Return Botox with Frederick Bender MD   [facility] ([facility])    [address]  3rd Floor  [facility] [address] [phone]              Who to contact     Please call your clinic at [phone] to:    Ask questions about your health    Make or cancel appointments    Discuss your medicines    Learn about your test results    Speak to your doctor   If you have compliments or concerns about an experience at your clinic, or if you wish to file a complaint, please contact [facility] Patient Relations at [phone] or email us at [email or web address]         Additional Information About Your Visit        [employer] Information     [employer] is an electronic gateway that provides easy, online access to your medical records. With [employer], you can request a clinic appointment, read your test results, renew a prescription or communicate with your care team.     To sign up for [employer] visit the website at [email or web address]   You will be asked to enter the access code listed below, as well as some personal information. Please follow the directions to create your username and password.     Your access code is:  [email or web address]  Expires: 10/29/2017 10:26 AM     Your access code will  in 90 days. If you need help or a new code, please contact your [facility] or call [phone] for assistance.        Care EveryWhere ID     This is your Care EveryWhere ID. This could be used by other organizations to access your [place] medical records  [id number]         Blood Pressure from Last 3 Encounters:   17 102/68   17 109/65   17 94/62    Weight from Last 3 Encounters:   17 64.9 kg (143 lb)   17 64.9 kg (143 lb)   17 66.2 kg (146 lb)              Today, you had the following     No orders found for display         Today's Medication Changes          These changes are accurate as of: 17 10:32 AM.  If you have any questions, ask your nurse or doctor.               These medicines have changed or have updated prescriptions.        Dose/Directions    amitriptyline 25 MG tablet   Commonly known as:  ELAVIL   This may have changed:  how much to take   Used for:  Atypical chest pain, Insomnia, unspecified type        Dose:  25 mg   Take 1 tablet (25 mg) by mouth At Bedtime   Quantity:  45 tablet   Refills:  5                Primary Care Provider Office Phone # Fax #    Sonja EVI Laguerre [phone]       [address]  [facility] [address]        Equal Access to Services     NABIL GALEANO AH: Hadii malcolm ku hadasho Soteddyali, waaxda luqadaha, qaybta kaalmada adeegyada, waxay brad rodriguez . So [facility] [phone].    ATENCIÓN: Si habla español, tiene a livingston disposición servicios gratuitos de asistencia lingüística. Llame al [phone].    We comply with applicable federal civil rights laws and [place] laws. We do not discriminate on the basis of race, color, national origin, age, disability sex, sexual orientation or gender identity.            Thank you!     Thank you for choosing [facility]  for your care. Our  goal is always to provide you with excellent care. Hearing back from our patients is one way we can continue to improve our services. Please take a few minutes to complete the written survey that you may receive in the mail after your visit with us. Thank you!             Your Updated Medication List - Protect others around you: Learn how to safely use, store and throw away your medicines at www.disposemymeds.org.          This list is accurate as of: 9/21/17 10:32 AM.  Always use your most recent med list.                   Brand Name Dispense Instructions for use Diagnosis    albuterol 108 (90 BASE) MCG/ACT Inhaler    PROAIR HFA/PROVENTIL HFA/VENTOLIN HFA    1 Inhaler    Inhale 2 puffs into the lungs every 6 hours as needed for shortness of breath / dyspnea or wheezing    Atypical chest pain       amitriptyline 25 MG tablet    ELAVIL    45 tablet    Take 1 tablet (25 mg) by mouth At Bedtime    Atypical chest pain, Insomnia, unspecified type       apremilast 30 MG tablet    OTEZLA    60 tablet    20 mg in AM and 30mg in PM daily X 2 weeks and then 30mg twice daily.    Behcet's disease (H)       ASPIRIN CHILDRENS 81 MG chewable tablet   Generic drug:  aspirin      Take 81 mg by mouth as needed        betamethasone valerate 0.1 % cream    VALISONE     Apply topically 2 times daily        * botulinum toxin type A 100 UNITS injection    BOTOX    200 Units    Inject 200 Units into the muscle every 3 months    Cervical dystonia       * ONABOTULINUMTOXINA IJ      Inject 165 Units as directed once Lot# /[id number] Expiration: 04/2020        carbamide peroxide 6.5 % otic solution    DEBROX     5 drops 2 times daily        clobetasol 0.05 % cream    TEMOVATE    60 g    Apply topically 2 times daily    Folliculitis       Colchicine 0.6 MG Caps     90 capsule    Take 0.6 mg by mouth See Admin Instructions 2 capsules in AM and 1 capsule in PM    Behcet's syndrome (H)       diclofenac 1 % Gel topical gel    VOLTAREN    100 g     Apply 2-4 grams to affected area(s) up to 4 times per day as needed. This is an anti-inflammatory medication.    Polyarthralgia       dicyclomine 20 MG tablet    BENTYL    60 tablet    Take 1 tablet (20 mg) by mouth 4 times daily as needed    Abdominal cramping       diphenhydrAMINE 25 MG tablet    BENADRYL    30 tablet    Take 1 tablet (25 mg) by mouth every 8 hours as needed for allergies        EPINEPHrine 0.3 MG/0.3ML injection 2-pack    EPIPEN 2-EAMON    0.6 mL    Inject 0.3 mLs (0.3 mg) into the muscle as needed for anaphylaxis    Hx of bee sting allergy       EXCEDRIN MIGRAINE PO      Take by mouth as needed        guanFACINE 1 MG tablet    TENEX    180 tablet    Take 3 tablets (3 mg) by mouth twice daily in the morning and evening daily.    Tic       hydrOXYzine 25 MG tablet    ATARAX    60 tablet    Take 1-2 tablets (25-50 mg) by mouth every 6 hours as needed for anxiety    TAHIR (generalized anxiety disorder)       hyoscyamine 0.125 MG tablet    ANASPAZ/LEVSIN    40 tablet    Take 1-2 tablets (125-250 mcg) by mouth every 4 hours as needed for cramping    Abdominal pain, generalized       hypromellose 0.3 % Soln ophthalmic solution    GENTEAL     1 drop every hour as needed        LACTAID PO      Take by mouth daily        lactulose 20 GM/30ML Soln     300 mL    Take 30 mLs by mouth 3 times daily as needed        lidocaine 2 % topical gel    XYLOCAINE     Apply 1 Tube topically daily Reported on 4/21/2017        lidocaine visc 2% 2.5mL/5mL & maalox/mylanta w/ simeth 2.5mL/5mL & diphenhydrAMINE 5mg/5mL Susp suspension    [facility]    1 Bottle    Swish and swallow 10 mLs in mouth every 6 hours as needed for mouth sores    Behcet's syndrome (H)       linaclotide 290 MCG capsule    LINZESS    90 capsule    Take 1 capsule (290 mcg) by mouth every morning (before breakfast)    Irritable bowel syndrome       LORazepam 1 MG tablet    ATIVAN    90 tablet    Take 0.5 tablets (0.5 mg) by mouth 2 times daily  as needed for other (abdominal pain) Do not operate a vehicle after taking this medication    Chronic abdominal pain       meclizine 25 MG tablet    ANTIVERT    30 tablet    Take 1 tablet (25 mg) by mouth every 6 hours as needed for dizziness    Nausea       metaxalone 800 MG tablet    SKELAXIN    30 tablet    Take 0.5 tablets (400 mg) by mouth 3 times daily as needed for moderate pain    Sprain of neck, initial encounter, Cervical dystonia       methylPREDNISolone 4 MG tablet    MEDROL DOSEPAK    21 tablet    Take 2 tablets (8 mg) by mouth See Admin Instructions follow package directions    Behcet's disease (H)       norgestimate-ethinyl estradiol 0.25-35 MG-MCG per tablet    ORTHO-CYCLEN, SPRINTEC    84 tablet    Take 1 tablet by mouth daily    General counseling for prescription of oral contraceptives       omeprazole 40 MG capsule    priLOSEC    90 capsule    Take 1 capsule (40 mg) by mouth daily    Gastroesophageal reflux disease, esophagitis presence not specified       ondansetron 8 MG ODT tab    ZOFRAN-ODT    60 tablet    Take 1 tablet (8 mg) by mouth every 8 hours as needed for nausea    Non-intractable vomiting with nausea, unspecified vomiting type, Hematemesis, presence of nausea not specified       order for DME     1 Device    Post op shoe    Contusion of right foot, initial encounter       promethazine 25 MG tablet    PHENERGAN    20 tablet    Take 0.5-1 tablets (12.5-25 mg) by mouth every 6 hours as needed for nausea    Intractable chronic migraine without aura and without status migrainosus       sucralfate 1 GM/10ML suspension    CARAFATE    1200 mL    Take 10 mLs (1 g) by mouth 4 times daily    Bile reflux gastritis, Nausea       triamcinolone 0.1 % cream    KENALOG    15 g    Apply sparingly to oral ulcers three times daily for 14 days as needed.    Behcet's syndrome (H)       * Notice:  This list has 2 medication(s) that are the same as other medications prescribed for you. Read the directions  carefully, and ask your doctor or other care provider to review them with you.

## 2017-09-21 NOTE — PROGRESS NOTES
I have confirmed the patient's and reviewed Past Medical History, Past Surgical History, Social History, Family History, Problem List, Medication List and agree with Tech note.    CC: eval for red eye     HPI: had flare up and rheumatologist started patient on a medrol does pack on Tuesday .    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 She was diagnosed based on oral/genital ulcers, joint pains, rash, and eye redness. She notes that she was started on colchicine last year, and was subsequently increased dose in March 2015. She states that it has helped. Her eyes have been red off and on, mostly at night, since December 2014. She also has floaters in each eye since that time. She has tried lubricating drops which have not helped. This has also not changed with the cochicine.    Famhx: unknown  POHx: glasses    Assessment and Plan:  1. 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 PEEWEE with AGA MARKS   - Continue AFT's and ocular lubricant at bedtime    3. Myopia   - Updated Rx dispensed      Return to clinic: 1 year, sooner as needed         Umer Vogel MD PhD.  Professor & Chair

## 2017-09-21 NOTE — NURSING NOTE
Chief Complaints and History of Present Illnesses   Patient presents with     Follow Up For     Behcets' disease     HPI    Affected eye(s):  Both   Symptoms:        Duration:  1 year   Frequency:  Constant       Do you have eye pain now?:  No      Comments:  Pt. States that she has been having intermittent stabbing pain LE>RE.  BE have been red off and on.  Now seeing floaters BE and occasional flashes RE.  Mandy Garcia COT 9:46 AM September 21, 2017

## 2017-09-21 NOTE — TELEPHONE ENCOUNTER
1.  Date/reason for appt:10/3/17, Seizures    2.  Referring provider: ODILIA RANDLE    3.  Call to patient (Yes / No - short description):No, referred     4.  Previous care at / records requested from:   [facility]

## 2017-09-26 ENCOUNTER — OFFICE VISIT (OUTPATIENT)
Dept: PALLIATIVE MEDICINE | Facility: CLINIC | Age: 22
End: 2017-09-26
Payer: COMMERCIAL

## 2017-09-26 ENCOUNTER — TELEPHONE (OUTPATIENT)
Dept: RHEUMATOLOGY | Facility: CLINIC | Age: 22
End: 2017-09-26

## 2017-09-26 DIAGNOSIS — G89.4 CHRONIC PAIN SYNDROME: Primary | ICD-10-CM

## 2017-09-26 PROCEDURE — 97110 THERAPEUTIC EXERCISES: CPT | Mod: GP | Performed by: PHYSICAL THERAPIST

## 2017-09-26 PROCEDURE — 97112 NEUROMUSCULAR REEDUCATION: CPT | Mod: GP | Performed by: PHYSICAL THERAPIST

## 2017-09-26 PROCEDURE — 97535 SELF CARE MNGMENT TRAINING: CPT | Mod: GP | Performed by: PHYSICAL THERAPIST

## 2017-09-26 NOTE — MR AVS SNAPSHOT
After Visit Summary   9/26/2017    Samara Oropeza    MRN: [id number]           Patient Information     Date Of Birth          1995        Visit Information        Provider Department      9/26/2017 3:15 PM Emily Rollins, PT [facility]        Today's Diagnoses     Chronic pain syndrome    -  1       Follow-ups after your visit        Your next 10 appointments already scheduled     Oct 09, 2017  2:15 PM CDT   Return Visit with Cata Hurst NP   [facility] ([facility])    [address]  [address]  [facility] [address]   [phone]            Oct 10, 2017 11:45 AM CDT   Return Visit with Emily Rollins PT   [facility] ([facility])    [address]  [address]  [facility] [id number] [phone]            Oct 11, 2017  1:00 PM CDT   (Arrive by 12:45 PM)   Return Visit with EVI Vizcaino CNP   [facility] ([facility])    [address] [place] [address] [address]  [address]th [facility] [id number] [phone]            Oct 12, 2017  2:30 PM CDT   Return Visit with Kimo Hudson LP   [facility] ([facility])    [address]  [address]  [facility] [id number] [phone]            Oct 12, 2017  3:30 PM CDT   Return Visit with EVI Dahl CNP   [facility] ([facility])    [address]  [address]  [facility] [id number] [phone]            Oct 18, 2017 11:30 AM CDT   Return Visit with Austen Marquez MD   [facility] ([facility])    [address] [phone]            Oct 25, 2017  7:00 AM CDT   (Arrive by 6:45 AM)   MR BRAIN W/O CONTRAST with [id number]   [facility] ([facility])    [address] [place] [address]  Se  [address]st [facility] [address] [phone]           Take your medicines as usual, unless your doctor tells you not to. Bring a list of your current medicines to your exam (including vitamins, minerals and over-the-counter drugs). Also bring the results of similar scans you may have had.  Please remove any body piercings and hair extensions before you arrive.  Follow your doctor s orders. If you do not, we may have to postpone your exam.  You will not have contrast for this exam. You do not need to do anything special to prepare.  The MRI machine uses a strong magnet. Please wear clothes without metal (snaps, zippers). A sweatsuit works well, or we may give you a hospital gown.   **IMPORTANT** THE INSTRUCTIONS BELOW ARE ONLY FOR THOSE PATIENTS WHO HAVE BEEN TOLD THEY WILL RECEIVE SEDATION OR GENERAL ANESTHESIA DURING THEIR MRI PROCEDURE:  IF YOU WILL RECEIVE SEDATION (take medicine to help you relax during your exam):   You must get the medicine from your doctor before you arrive. Bring the medicine to the exam. Do not take it at home.   Arrive one hour early. Bring someone who can take you home after the test. Your medicine will make you sleepy. After the exam, you may not drive, take a bus or take a taxi by yourself.   No eating 8 hours before your exam. You may have clear liquids up until 4 hours before your exam. (Clear liquids include water, clear tea, black coffee and fruit juice without pulp.)  IF YOU WILL RECEIVE ANESTHESIA (be asleep for your exam):   Arrive 1 1/2 hours early. Bring someone who can take you home after the test. You may not drive, take a bus or take a taxi by yourself.   No eating 8 hours before your exam. You may have clear liquids up until 4 hours before your exam. (Clear liquids include water, clear tea, black coffee and fruit juice without pulp.)   You will spend four to five hours in the recovery room.  Please call the Imaging Department at your exam site with any  questions.            Oct 25, 2017  8:30 AM CDT   (Arrive by 8:15 AM)   In Lab Video Visit with  EEG TECH 2   [facility] ([facility])    [address] [place] [address]   [phone]            Dec 06, 2017  3:00 PM CST   (Arrive by 2:45 PM)   Return Botox with Frederick Bender MD   [facility] ([facility])    [address] [place] [address]   [phone]            Feb 13, 2018  2:30 PM CST   (Arrive by 2:15 PM)   Return Seizure with Sigifredo Flores MD   [facility] ([facility])    [address] [place] [id number] [phone]              Future tests that were ordered for you today     Open Future Orders        Priority Expected Expires Ordered    EEG video monitoring Routine 10/4/2017 10/3/2018 10/3/2017    MR Brain w/o Contrast Routine 10/4/2017 1/31/2018 10/3/2017            Who to contact     If you have questions or need follow up information about today's clinic visit or your schedule please contact [facility] directly at [phone].  Normal or non-critical lab and imaging results will be communicated to you by MyChart, letter or phone within 4 business days after the clinic has received the results. If you do not hear from us within 7 days, please contact the clinic through [employer] or phone. If you have a critical or abnormal lab result, we will notify you by phone as soon as possible.  Submit refill requests through [employer] or call your pharmacy and they will forward the refill request to us. Please allow 3 business days for your refill to be completed.          Additional Information About Your Visit        [employer] Information     [employer] lets you send messages to your doctor, view your test results, renew your prescriptions, schedule appointments and more. To sign up,  "go to [email or web address] . Click on \"Log in\" on the left side of the screen, which will take you to the Welcome page. Then click on \"Sign up Now\" on the right side of the page.     You will be asked to enter the access code listed below, as well as some personal information. Please follow the directions to create your username and password.     Your access code is: [email or web address]  Expires: 10/29/2017 10:26 AM     Your access code will  in 90 days. If you need help or a new code, please call your [facility] clinic or [phone].        Care EveryWhere ID     This is your Care EveryWhere ID. This could be used by other organizations to access your [facility] medical records  [id number]         Blood Pressure from Last 3 Encounters:   10/03/17 110/75   17 102/68   17 109/65    Weight from Last 3 Encounters:   10/03/17 65.2 kg (143 lb 12.8 oz)   17 64.9 kg (143 lb)   17 64.9 kg (143 lb)              We Performed the Following     NEUROMUSCULAR RE-EDUCATION     SELF CARE MNGMENT TRAINING     THERAPEUTIC EXERCISES          Today's Medication Changes          These changes are accurate as of: 17 11:59 PM.  If you have any questions, ask your nurse or doctor.               These medicines have changed or have updated prescriptions.        Dose/Directions    amitriptyline 25 MG tablet   Commonly known as:  ELAVIL   This may have changed:  how much to take   Used for:  Atypical chest pain, Insomnia, unspecified type        Dose:  25 mg   Take 1 tablet (25 mg) by mouth At Bedtime   Quantity:  45 tablet   Refills:  5                Primary Care Provider Office Phone # Fax #    EVI Cardona [facility] [phone]       [address] [address]        Equal Access to Services     NABIL GALEANO : Brandon Santiago, channing hurtado, mike maloney. Formerly [facility] [phone].    ATENCIÓN: Si habla " español, tiene a livingston disposición servicios gratuitos de asistencia lingüística. Jesus bullard [phone].    We comply with applicable federal civil rights laws and [place] laws. We do not discriminate on the basis of race, color, national origin, age, disability, sex, sexual orientation, or gender identity.            Thank you!     Thank you for choosing [facility]  for your care. Our goal is always to provide you with excellent care. Hearing back from our patients is one way we can continue to improve our services. Please take a few minutes to complete the written survey that you may receive in the mail after your visit with us. Thank you!             Your Updated Medication List - Protect others around you: Learn how to safely use, store and throw away your medicines at www.disposemymeds.org.          This list is accurate as of: 9/26/17 11:59 PM.  Always use your most recent med list.                   Brand Name Dispense Instructions for use Diagnosis    albuterol 108 (90 BASE) MCG/ACT Inhaler    PROAIR HFA/PROVENTIL HFA/VENTOLIN HFA    1 Inhaler    Inhale 2 puffs into the lungs every 6 hours as needed for shortness of breath / dyspnea or wheezing    Atypical chest pain       amitriptyline 25 MG tablet    ELAVIL    45 tablet    Take 1 tablet (25 mg) by mouth At Bedtime    Atypical chest pain, Insomnia, unspecified type       apremilast 30 MG tablet    OTEZLA    60 tablet    20 mg in AM and 30mg in PM daily X 2 weeks and then 30mg twice daily.    Behcet's disease (H)       ASPIRIN CHILDRENS 81 MG chewable tablet   Generic drug:  aspirin      Take 81 mg by mouth as needed        betamethasone valerate 0.1 % cream    VALISONE     Apply topically 2 times daily        * botulinum toxin type A 100 UNITS injection    BOTOX    200 Units    Inject 200 Units into the muscle every 3 months    Cervical dystonia       * ONABOTULINUMTOXINA IJ      Inject 165 Units as directed once Lot# /[id number]  Expiration: 04/2020        carbamide peroxide 6.5 % otic solution    DEBROX     5 drops 2 times daily        clobetasol 0.05 % cream    TEMOVATE    60 g    Apply topically 2 times daily    Folliculitis       Colchicine 0.6 MG Caps     90 capsule    Take 0.6 mg by mouth See Admin Instructions 2 capsules in AM and 1 capsule in PM    Behcet's syndrome (H)       diclofenac 1 % Gel topical gel    VOLTAREN    100 g    Apply 2-4 grams to affected area(s) up to 4 times per day as needed. This is an anti-inflammatory medication.    Polyarthralgia       dicyclomine 20 MG tablet    BENTYL    60 tablet    Take 1 tablet (20 mg) by mouth 4 times daily as needed    Abdominal cramping       diphenhydrAMINE 25 MG tablet    BENADRYL    30 tablet    Take 1 tablet (25 mg) by mouth every 8 hours as needed for allergies        EPINEPHrine 0.3 MG/0.3ML injection 2-pack    EPIPEN 2-EAMON    0.6 mL    Inject 0.3 mLs (0.3 mg) into the muscle as needed for anaphylaxis    Hx of bee sting allergy       EXCEDRIN MIGRAINE PO      Take by mouth as needed        guanFACINE 1 MG tablet    TENEX    180 tablet    Take 3 tablets (3 mg) by mouth twice daily in the morning and evening daily.    Tic       hydrOXYzine 25 MG tablet    ATARAX    60 tablet    Take 1-2 tablets (25-50 mg) by mouth every 6 hours as needed for anxiety    TAHIR (generalized anxiety disorder)       hyoscyamine 0.125 MG tablet    ANASPAZ/LEVSIN    40 tablet    Take 1-2 tablets (125-250 mcg) by mouth every 4 hours as needed for cramping    Abdominal pain, generalized       hypromellose 0.3 % Soln ophthalmic solution    GENTEAL     1 drop every hour as needed        LACTAID PO      Take by mouth daily        lactulose 20 GM/30ML Soln     300 mL    Take 30 mLs by mouth 3 times daily as needed        lidocaine 2 % topical gel    XYLOCAINE     Apply 1 Tube topically daily Reported on 4/21/2017        lidocaine visc 2% 2.5mL/5mL & maalox/mylanta w/ simeth 2.5mL/5mL & diphenhydrAMINE 5mg/5mL  Susp suspension    [facility] [place] [facility]\""    1 Bottle    Swish and swallow 10 mLs in mouth every 6 hours as needed for mouth sores    Behcet's syndrome (H)       linaclotide 290 MCG capsule    LINZESS    90 capsule    Take 1 capsule (290 mcg) by mouth every morning (before breakfast)    Irritable bowel syndrome       LORazepam 1 MG tablet    ATIVAN    90 tablet    Take 0.5 tablets (0.5 mg) by mouth 2 times daily as needed for other (abdominal pain) Do not operate a vehicle after taking this medication    Chronic abdominal pain       meclizine 25 MG tablet    ANTIVERT    30 tablet    Take 1 tablet (25 mg) by mouth every 6 hours as needed for dizziness    Nausea       metaxalone 800 MG tablet    SKELAXIN    30 tablet    Take 0.5 tablets (400 mg) by mouth 3 times daily as needed for moderate pain    Sprain of neck, initial encounter, Cervical dystonia       methylPREDNISolone 4 MG tablet    MEDROL DOSEPAK    21 tablet    Take 2 tablets (8 mg) by mouth See Admin Instructions follow package directions    Behcet's disease (H)       norgestimate-ethinyl estradiol 0.25-35 MG-MCG per tablet    ORTHO-CYCLEN, SPRINTEC    84 tablet    Take 1 tablet by mouth daily    General counseling for prescription of oral contraceptives       omeprazole 40 MG capsule    priLOSEC    90 capsule    Take 1 capsule (40 mg) by mouth daily    Gastroesophageal reflux disease, esophagitis presence not specified       ondansetron 8 MG ODT tab    ZOFRAN-ODT    60 tablet    Take 1 tablet (8 mg) by mouth every 8 hours as needed for nausea    Non-intractable vomiting with nausea, unspecified vomiting type, Hematemesis, presence of nausea not specified       order for DME     1 Device    Post op shoe    Contusion of right foot, initial encounter       promethazine 25 MG tablet    PHENERGAN    20 tablet    Take 0.5-1 tablets (12.5-25 mg) by mouth every 6 hours as needed for nausea    Intractable chronic migraine without aura and without status  migrainosus       sucralfate 1 GM/10ML suspension    CARAFATE    1200 mL    Take 10 mLs (1 g) by mouth 4 times daily    Bile reflux gastritis, Nausea       triamcinolone 0.1 % cream    KENALOG    15 g    Apply sparingly to oral ulcers three times daily for 14 days as needed.    Behcet's syndrome (H)       * Notice:  This list has 2 medication(s) that are the same as other medications prescribed for you. Read the directions carefully, and ask your doctor or other care provider to review them with you.

## 2017-09-26 NOTE — PROGRESS NOTES
"Patient Name: Samara Oropeza      YOB: 1995     Medical Record Number: [id number]  Diagnosis: Chronic pain syndrome  Visit Info Length of Visit: 45  Arrived: On Time  Therapeutic Procedures/Exercises: 15 min; Therapeutic Activities: NA; Neuromuscular Re-education: 15 min;   Self Care / Home Management Training: 15 min    Exercise/Activity Education Instruction:  Self Care  - reviewed flare management strategies;  Activity:  Stretching:  Instructed in supine SKTC, piriformis and pretzel stretches  and LTR. Handouts given for these exs.  Encouraged to practice daily with mindful breathing.    Led through supine kinesthetic awareness activity (body scan) with mindful breathing. Reviewed use of free Insight Timer bobbi for guided options to practice body scan and mindful breathing.      Notes: Patient reports: she has been in a flare past two weeks; also not sleeping well - has had some \"all nighters\" - three nights in a row.   Part of flare is ulcers in multiple parts of her body.  Significant stressors: job, apt changes, medicaiton issues.  Has had episodes of \"passing out\" - being worked up for seizures  Hasn't been doing much d/t not feeling well.   HEP/Self Care/Home Management Training:   Pacing/Mini Breaks/Self Care: not addressed today.  ;   Relaxation Practice: has been using the breathing and Yoga Parting the Clouds - this really helps open her chest when she feels it getting tight - sometimes it is harder to do the relaxation techniques when she is feeling really anxious ; used the body scan when she started to get anxious in a procedure - was helpful  Posture Corrections: not addressed today.  ;   Walking program: has been less active with her exercise routines d/t not feeling well ;   Heat/Ice/TENS: not addressed today.   ;   HEP some stretching - depending on how she was feeling          Demonstrates/Verbalizes Technique: 3 (1= poor technique-difficulty performing exercises,significant " cues required; 5= good technique-performs exercises without cues)  Body Awareness: 3 (1=low awareness; 5=high awareness)  Posture/Stability: 3 (1= poor posture, stability; 5= good posture, stability)   Motivational Level:   Cooperative Participation:  Full   Patient Satisfaction:  satisfied   Response to Teaching:   demonstrated ability and verbalized, recall/understanding  Factors that affect learning: None   Plan of Care  Cont PT to support reactivation and integration of self regulation pain management skills. (next visit consider:  Mini breaks, practice yoga asanas and add core)   Therapist: Emily Rollins PT                 Date: 9/26/2017

## 2017-09-27 ENCOUNTER — FCC EXTENDED DOCUMENTATION (OUTPATIENT)
Dept: BEHAVIORAL HEALTH | Facility: CLINIC | Age: 22
End: 2017-09-27

## 2017-09-27 NOTE — PROGRESS NOTES
"                      Discharge Summary  Multiple Sessions    Client Name: Samara Oropeza MRN#: [id number] YOB: 1995      Intake / Discharge Date: 3/8/17 -  8/8/17      DSM5 Diagnoses: (Sustained by DSM5 Criteria Listed Above)  Diagnoses: 296.32 (F33.1) Major Depressive Disorder, Recurrent Episode, Moderate _  300.02 (F41.1) Generalized Anxiety Disorder  Psychosocial & Contextual Factors: Severe-Health concerns, family concerns, limited finances  WHODAS 2.0 (12 item) Score: 30          Presenting Concern:  Client came in to address depressive and anxiety symptoms as related to her medical health.      Reason for Discharge:  Client is satisfied with progress, Client did not return and Referred to PMD and psychiatry provider      Disposition at Time of Last Encounter:   Comments:   Client called intake center and cancelled any reminder appointments stated that she \"no longer needed services\"     Risk Management:   Client denies a history of suicidal ideation, suicide attempts, self-injurious behavior, homicidal ideation, homicidal behavior and and other safety concerns  A safety and risk management plan has not been developed at this time, however client was given the after-hours number / 911 should there be a change in any of these risk factors.      Referred To:  PMD and psychiatry provider        Layla Browne, [facility]   9/27/2017  "

## 2017-09-27 NOTE — Clinical Note
FYI, Client called and stated that she no longer needed services.  She has been discharged from services.  If she needs a referral, it may be helpful to have one closer to [place].    RODY Reardon, [facility] Therapist [facility] [phone]

## 2017-10-03 ENCOUNTER — OFFICE VISIT (OUTPATIENT)
Dept: NEUROLOGY | Facility: CLINIC | Age: 22
End: 2017-10-03

## 2017-10-03 VITALS
WEIGHT: 143.8 LBS | HEIGHT: 63 IN | DIASTOLIC BLOOD PRESSURE: 75 MMHG | TEMPERATURE: 98 F | SYSTOLIC BLOOD PRESSURE: 110 MMHG | RESPIRATION RATE: 20 BRPM | OXYGEN SATURATION: 98 % | BODY MASS INDEX: 25.48 KG/M2 | HEART RATE: 100 BPM

## 2017-10-03 DIAGNOSIS — R56.9 CONVULSIONS, UNSPECIFIED CONVULSION TYPE (H): Primary | ICD-10-CM

## 2017-10-03 DIAGNOSIS — R40.4 TRANSIENT ALTERATION OF AWARENESS: ICD-10-CM

## 2017-10-03 ASSESSMENT — PAIN SCALES - GENERAL: PAINLEVEL: MODERATE PAIN (5)
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
## 2017-10-03 NOTE — NURSING NOTE
Chief Complaint   Patient presents with     Consult     [facility]- SEIZURE DISORDER-ALFIE Betancur, CMA

## 2017-10-03 NOTE — MR AVS SNAPSHOT
After Visit Summary   10/3/2017    Samara Oropeza    MRN: [id number]           Patient Information     Date Of Birth          1995        Visit Information        Provider Department      10/3/2017 3:00 PM Sigifredo Flores MD [facility]        Today's Diagnoses     Convulsions, unspecified convulsion type (H)    -  1    Transient alteration of awareness           Follow-ups after your visit        Follow-up notes from your care team     Return in about 4 months (around 2/3/2018).      Your next 10 appointments already scheduled     Oct 09, 2017  2:15 PM CDT   Return Visit with Cata Hurst NP   [facility] ([facility])    [address]  [address]  [facility] [id number] [phone]            Oct 10, 2017 11:45 AM CDT   Return Visit with Emily Rollins PT   [facility] ([facility])    [address]  [facility] [phone] [phone]            Oct 11, 2017  1:00 PM CDT   (Arrive by 12:45 PM)   Return Visit with EVI Vizcaino CNP   [facility] ([facility])    [address]  4th Floor  [facility] [id number] [phone]            Oct 12, 2017  2:30 PM CDT   Return Visit with Kimo Hudson LP   [facility] ([facility])    [address]  [facility] [id number] [phone]            Oct 12, 2017  3:30 PM CDT   Return Visit with EVI Dahl CNP   [facility] ([facility])    [address]  [facility] [phone] [phone]            Oct 18, 2017 11:30 AM CDT   Return Visit with Austen Marquez MD   [facility][1] ([facility][1])    [address] [id number] [phone]            Oct 25, 2017  7:00 AM CDT    (Arrive by 6:45 AM)   MR BRAIN W/O CONTRAST with [id number]   [facility] ([facility])    [address]  1st Floor  [facility] [place] [address] [phone]           Take your medicines as usual, unless your doctor tells you not to. Bring a list of your current medicines to your exam (including vitamins, minerals and over-the-counter drugs). Also bring the results of similar scans you may have had.  Please remove any body piercings and hair extensions before you arrive.  Follow your doctor s orders. If you do not, we may have to postpone your exam.  You will not have contrast for this exam. You do not need to do anything special to prepare.  The MRI machine uses a strong magnet. Please wear clothes without metal (snaps, zippers). A sweatsuit works well, or we may give you a hospital gown.   **IMPORTANT** THE INSTRUCTIONS BELOW ARE ONLY FOR THOSE PATIENTS WHO HAVE BEEN TOLD THEY WILL RECEIVE SEDATION OR GENERAL ANESTHESIA DURING THEIR MRI PROCEDURE:  IF YOU WILL RECEIVE SEDATION (take medicine to help you relax during your exam):   You must get the medicine from your doctor before you arrive. Bring the medicine to the exam. Do not take it at home.   Arrive one hour early. Bring someone who can take you home after the test. Your medicine will make you sleepy. After the exam, you may not drive, take a bus or take a taxi by yourself.   No eating 8 hours before your exam. You may have clear liquids up until 4 hours before your exam. (Clear liquids include water, clear tea, black coffee and fruit juice without pulp.)  IF YOU WILL RECEIVE ANESTHESIA (be asleep for your exam):   Arrive 1 1/2 hours early. Bring someone who can take you home after the test. You may not drive, take a bus or take a taxi by yourself.   No eating 8 hours before your exam. You may have clear liquids up until 4 hours before your exam. (Clear liquids include water, clear tea, black coffee and fruit  juice without pulp.)   You will spend four to five hours in the recovery room.  Please call the Imaging Department at your exam site with any questions.            Oct 25, 2017  8:30 AM CDT   (Arrive by 8:15 AM)   In Lab Video Visit with  EEG TECH 2   [facility] ([facility])    [address] [place] [address]   [phone]            Dec 06, 2017  3:00 PM CST   (Arrive by 2:45 PM)   Return Botox with Frederick Bender MD   [facility] ([facility])    [address] [place] [address] [phone]            Feb 13, 2018  2:30 PM CST   (Arrive by 2:15 PM)   Return Seizure with Sigifredo Flores MD   [facility] Neurology ([facility])    [address] [place] [id number] [phone]              Future tests that were ordered for you today     Open Future Orders        Priority Expected Expires Ordered    EEG video monitoring Routine 10/4/2017 10/3/2018 10/3/2017    MR Brain w/o Contrast Routine 10/4/2017 1/31/2018 10/3/2017            Who to contact     Please call your clinic at [phone] to:    Ask questions about your health    Make or cancel appointments    Discuss your medicines    Learn about your test results    Speak to your doctor   If you have compliments or concerns about an experience at your clinic, or if you wish to file a complaint, please contact [facility] Patient Relations at [phone] or email us at [email or web address]         Additional Information About Your Visit        BISON Information     BISON is an electronic gateway that provides easy, online access to your medical records. With BISON, you can request a clinic appointment, read your test results, renew a prescription or communicate with your care team.     To sign up for BISON visit the  "website at [email or web address]   You will be asked to enter the access code listed below, as well as some personal information. Please follow the directions to create your username and password.     Your access code is: [email or web address]  Expires: 10/29/2017 10:26 AM     Your access code will  in 90 days. If you need help or a new code, please contact your [facility] or call [phone] for assistance.        Care EveryWhere ID     This is your Care EveryWhere ID. This could be used by other organizations to access your [facility] medical records  [id number]        Your Vitals Were     Pulse Temperature Respirations Height Pulse Oximetry Breastfeeding?    100 98  F (36.7  C) 20 1.6 m (5' 3\") 98% No    BMI (Body Mass Index)                   25.47 kg/m2            Blood Pressure from Last 3 Encounters:   10/03/17 110/75   17 102/68   17 109/65    Weight from Last 3 Encounters:   10/03/17 65.2 kg (143 lb 12.8 oz)   17 64.9 kg (143 lb)   17 64.9 kg (143 lb)                 Today's Medication Changes          These changes are accurate as of: 10/3/17  4:16 PM.  If you have any questions, ask your nurse or doctor.               These medicines have changed or have updated prescriptions.        Dose/Directions    amitriptyline 25 MG tablet   Commonly known as:  ELAVIL   This may have changed:  how much to take   Used for:  Atypical chest pain, Insomnia, unspecified type        Dose:  25 mg   Take 1 tablet (25 mg) by mouth At Bedtime   Quantity:  45 tablet   Refills:  5                Primary Care Provider Office Phone # Fax #    EVI Cardona [facility] [phone]       [address] [address]        Equal Access to Services     NABIL GALEANO : Brandon Santiago, wacarmen hurtado, qaybta kaalmamariya juan, mike parsons. So [facility] [phone].    ATENCIÓN: Si tariq contreras " [place] disposición servicios gratuitos de asistencia lingüística. Jesus bullard [phone].    We comply with applicable federal civil rights laws and [place] laws. We do not discriminate on the basis of race, color, national origin, age, disability, sex, sexual orientation, or gender identity.            Thank you!     Thank you for choosing [facility]  for your care. Our goal is always to provide you with excellent care. Hearing back from our patients is one way we can continue to improve our services. Please take a few minutes to complete the written survey that you may receive in the mail after your visit with us. Thank you!             Your Updated Medication List - Protect others around you: Learn how to safely use, store and throw away your medicines at www.disposemymeds.org.          This list is accurate as of: 10/3/17  4:16 PM.  Always use your most recent med list.                   Brand Name Dispense Instructions for use Diagnosis    albuterol 108 (90 BASE) MCG/ACT Inhaler    PROAIR HFA/PROVENTIL HFA/VENTOLIN HFA    1 Inhaler    Inhale 2 puffs into the lungs every 6 hours as needed for shortness of breath / dyspnea or wheezing    Atypical chest pain       amitriptyline 25 MG tablet    ELAVIL    45 tablet    Take 1 tablet (25 mg) by mouth At Bedtime    Atypical chest pain, Insomnia, unspecified type       apremilast 30 MG tablet    OTEZLA    60 tablet    20 mg in AM and 30mg in PM daily X 2 weeks and then 30mg twice daily.    Behcet's disease (H)       ASPIRIN CHILDRENS 81 MG chewable tablet   Generic drug:  aspirin      Take 81 mg by mouth as needed        betamethasone valerate 0.1 % cream    VALISONE     Apply topically 2 times daily        * botulinum toxin type A 100 UNITS injection    BOTOX    200 Units    Inject 200 Units into the muscle every 3 months    Cervical dystonia       * ONABOTULINUMTOXINA IJ      Inject 165 Units as directed once Lot# /C3 Expiration: 04/2020        carbamide  peroxide 6.5 % otic solution    DEBROX     5 drops 2 times daily        clobetasol 0.05 % cream    TEMOVATE    60 g    Apply topically 2 times daily    Folliculitis       Colchicine 0.6 MG Caps     90 capsule    Take 0.6 mg by mouth See Admin Instructions 2 capsules in AM and 1 capsule in PM    Behcet's syndrome (H)       diclofenac 1 % Gel topical gel    VOLTAREN    100 g    Apply 2-4 grams to affected area(s) up to 4 times per day as needed. This is an anti-inflammatory medication.    Polyarthralgia       dicyclomine 20 MG tablet    BENTYL    60 tablet    Take 1 tablet (20 mg) by mouth 4 times daily as needed    Abdominal cramping       diphenhydrAMINE 25 MG tablet    BENADRYL    30 tablet    Take 1 tablet (25 mg) by mouth every 8 hours as needed for allergies        EPINEPHrine 0.3 MG/0.3ML injection 2-pack    EPIPEN 2-EAMON    0.6 mL    Inject 0.3 mLs (0.3 mg) into the muscle as needed for anaphylaxis    Hx of bee sting allergy       EXCEDRIN MIGRAINE PO      Take by mouth as needed        guanFACINE 1 MG tablet    TENEX    180 tablet    Take 3 tablets (3 mg) by mouth twice daily in the morning and evening daily.    Tic       hydrOXYzine 25 MG tablet    ATARAX    60 tablet    Take 1-2 tablets (25-50 mg) by mouth every 6 hours as needed for anxiety    TAHIR (generalized anxiety disorder)       hyoscyamine 0.125 MG tablet    ANASPAZ/LEVSIN    40 tablet    Take 1-2 tablets (125-250 mcg) by mouth every 4 hours as needed for cramping    Abdominal pain, generalized       hypromellose 0.3 % Soln ophthalmic solution    GENTEAL     1 drop every hour as needed        LACTAID PO      Take by mouth daily        lactulose 20 GM/30ML Soln     300 mL    Take 30 mLs by mouth 3 times daily as needed        lidocaine 2 % topical gel    XYLOCAINE     Apply 1 Tube topically daily Reported on 4/21/2017        lidocaine visc 2% 2.5mL/5mL & maalox/mylanta w/ simeth 2.5mL/5mL & diphenhydrAMINE 5mg/5mL Susp suspension    MAGIC Mouthwash  HOSPITAL    1 Bottle    Swish and swallow 10 mLs in mouth every 6 hours as needed for mouth sores    Behcet's syndrome (H)       linaclotide 290 MCG capsule    LINZESS    90 capsule    Take 1 capsule (290 mcg) by mouth every morning (before breakfast)    Irritable bowel syndrome       LORazepam 1 MG tablet    ATIVAN    90 tablet    Take 0.5 tablets (0.5 mg) by mouth 2 times daily as needed for other (abdominal pain) Do not operate a vehicle after taking this medication    Chronic abdominal pain       meclizine 25 MG tablet    ANTIVERT    30 tablet    Take 1 tablet (25 mg) by mouth every 6 hours as needed for dizziness    Nausea       metaxalone 800 MG tablet    SKELAXIN    30 tablet    Take 0.5 tablets (400 mg) by mouth 3 times daily as needed for moderate pain    Sprain of neck, initial encounter, Cervical dystonia       methylPREDNISolone 4 MG tablet    MEDROL DOSEPAK    21 tablet    Take 2 tablets (8 mg) by mouth See Admin Instructions follow package directions    Behcet's disease (H)       norgestimate-ethinyl estradiol 0.25-35 MG-MCG per tablet    ORTHO-CYCLEN, SPRINTEC    84 tablet    Take 1 tablet by mouth daily    General counseling for prescription of oral contraceptives       omeprazole 40 MG capsule    priLOSEC    90 capsule    Take 1 capsule (40 mg) by mouth daily    Gastroesophageal reflux disease, esophagitis presence not specified       ondansetron 8 MG ODT tab    ZOFRAN-ODT    60 tablet    Take 1 tablet (8 mg) by mouth every 8 hours as needed for nausea    Non-intractable vomiting with nausea, unspecified vomiting type, Hematemesis, presence of nausea not specified       order for DME     1 Device    Post op shoe    Contusion of right foot, initial encounter       promethazine 25 MG tablet    PHENERGAN    20 tablet    Take 0.5-1 tablets (12.5-25 mg) by mouth every 6 hours as needed for nausea    Intractable chronic migraine without aura and without status migrainosus       sucralfate 1 GM/10ML  suspension    CARAFATE    1200 mL    Take 10 mLs (1 g) by mouth 4 times daily    Bile reflux gastritis, Nausea       triamcinolone 0.1 % cream    KENALOG    15 g    Apply sparingly to oral ulcers three times daily for 14 days as needed.    Behcet's syndrome (H)       * Notice:  This list has 2 medication(s) that are the same as other medications prescribed for you. Read the directions carefully, and ask your doctor or other care provider to review them with you.

## 2017-10-06 DIAGNOSIS — F95.9 TIC: ICD-10-CM

## 2017-10-06 RX ORDER — GUANFACINE 1 MG/1
TABLET ORAL
Qty: 180 TABLET | Refills: 0 | Status: SHIPPED | OUTPATIENT
Start: 2017-10-06 | End: 2017-11-09

## 2017-10-06 NOTE — TELEPHONE ENCOUNTER
guanFACINE 1 mg oral tablet        Last Written Prescription Date:  6/6/17  Last Fill Quantity: 180,   # refills: 3  Last Office Visit with [facility], [facility] or [facility] prescribing provider: 6/23/17  Future Office visit:    Next 5 appointments (look out 90 days)     Oct 09, 2017  2:15 PM CDT   Return Visit with Cata Hurst NP   [facility] ([facility])    [address]  [address]  [facility] [address] [phone]            Oct 10, 2017 11:45 AM CDT   Return Visit with Emily Rollins PT   [facility] ([facility])    [address]  [address]  [facility] [phone] [phone]            Oct 12, 2017  2:30 PM CDT   Return Visit with Kimo Hudson LP   [facility] ([facility])    [address]  [facility] [phone] [phone]            Oct 12, 2017  3:30 PM CDT   Return Visit with EVI Dahl CNP   [facility] ([facility])    [address]  [facility] [address] [phone]            Oct 18, 2017 11:30 AM CDT   Return Visit with Austen Marquez MD   [facility] ([facility])    [address] [id number] [phone]                   Routing refill request to provider for review/approval because:  Drug not on the [facility], [facility] or [facility] refill protocol or controlled substance

## 2017-10-06 NOTE — TELEPHONE ENCOUNTER
Routing refill request to provider for review/approval because:  Drug not on the [facility] refill protocol       Thank you  Wes Locke RN

## 2017-10-06 NOTE — TELEPHONE ENCOUNTER
Call received from patient's mother. She reports the patient is out of:    GuanFACINE (TENEX) 1 MG tablet      Called [facility] pharmacy, they have a shortage of this medication but are able to fill a partial script. Pharmacist stated they have extended release form available for full dispensable amount.    [facility] also has a shortage of this medication.    Routing to provider pool for review as patient is out of medication.     Miley Avila RN  [facility]

## 2017-10-09 ENCOUNTER — OFFICE VISIT (OUTPATIENT)
Dept: PSYCHIATRY | Facility: CLINIC | Age: 22
End: 2017-10-09
Payer: COMMERCIAL

## 2017-10-09 VITALS
WEIGHT: 145 LBS | HEIGHT: 63 IN | HEART RATE: 110 BPM | BODY MASS INDEX: 25.69 KG/M2 | TEMPERATURE: 97.9 F | OXYGEN SATURATION: 100 % | DIASTOLIC BLOOD PRESSURE: 56 MMHG | SYSTOLIC BLOOD PRESSURE: 108 MMHG

## 2017-10-09 DIAGNOSIS — F41.1 GAD (GENERALIZED ANXIETY DISORDER): Primary | ICD-10-CM

## 2017-10-09 DIAGNOSIS — F33.1 MAJOR DEPRESSIVE DISORDER, RECURRENT EPISODE, MODERATE (H): ICD-10-CM

## 2017-10-09 DIAGNOSIS — F95.2 TOURETTE'S SYNDROME: Primary | ICD-10-CM

## 2017-10-09 PROCEDURE — 99214 OFFICE O/P EST MOD 30 MIN: CPT | Performed by: NURSE PRACTITIONER

## 2017-10-09 RX ORDER — GUANFACINE 1 MG/1
TABLET ORAL
COMMUNITY
Start: 2017-10-06 | End: 2017-10-09

## 2017-10-09 RX ORDER — GUANFACINE 3 MG/1
6 TABLET, EXTENDED RELEASE ORAL AT BEDTIME
Qty: 60 TABLET | Refills: 0 | Status: SHIPPED | OUTPATIENT
Start: 2017-10-09 | End: 2017-10-11

## 2017-10-09 RX ORDER — HYDROXYZINE HYDROCHLORIDE 25 MG/1
25-50 TABLET, FILM COATED ORAL EVERY 6 HOURS PRN
Qty: 60 TABLET | Refills: 1 | Status: SHIPPED | OUTPATIENT
Start: 2017-10-09 | End: 2017-12-12

## 2017-10-09 ASSESSMENT — ANXIETY QUESTIONNAIRES
5. BEING SO RESTLESS THAT IT IS HARD TO SIT STILL: NEARLY EVERY DAY
GAD7 TOTAL SCORE: 19
3. WORRYING TOO MUCH ABOUT DIFFERENT THINGS: NEARLY EVERY DAY
7. FEELING AFRAID AS IF SOMETHING AWFUL MIGHT HAPPEN: MORE THAN HALF THE DAYS
GAD7 TOTAL SCORE: 19
1. FEELING NERVOUS, ANXIOUS, OR ON EDGE: NEARLY EVERY DAY
6. BECOMING EASILY ANNOYED OR IRRITABLE: MORE THAN HALF THE DAYS
GAD7 TOTAL SCORE: 19
2. NOT BEING ABLE TO STOP OR CONTROL WORRYING: NEARLY EVERY DAY
7. FEELING AFRAID AS IF SOMETHING AWFUL MIGHT HAPPEN: MORE THAN HALF THE DAYS
4. TROUBLE RELAXING: NEARLY EVERY DAY

## 2017-10-09 ASSESSMENT — PATIENT HEALTH QUESTIONNAIRE - PHQ9
SUM OF ALL RESPONSES TO PHQ QUESTIONS 1-9: 17
10. IF YOU CHECKED OFF ANY PROBLEMS, HOW DIFFICULT HAVE THESE PROBLEMS MADE IT FOR YOU TO DO YOUR WORK, TAKE CARE OF THINGS AT HOME, OR GET ALONG WITH OTHER PEOPLE: VERY DIFFICULT
SUM OF ALL RESPONSES TO PHQ QUESTIONS 1-9: 17

## 2017-10-09 NOTE — PROGRESS NOTES
"    Outpatient Psychiatric Progress Note    Name: Samara Oropeza   : 1995                    Primary Care Provider: EVI Cardona CNP - last visit   Therapist: Layla Browne  - last visit 2017 discharged  [facility]    PHQ-9 scores:  PHQ-9 SCORE 2017 2017 10/9/2017   Total Score 17 16 17       TAHIR-7 scores:  TAHIR-7 SCORE 2017 2017 10/9/2017   Total Score 18 17 19     Answers for HPI/ROS submitted by the patient on 10/9/2017   If you checked off any problems, how difficult have these problems made it for you to do your work, take care of things at home, or get along with other people?: Very difficult    Patient Identification:  Patient is a 22 year old year old, partnered / significant other  Two or more races American female  who presents for return visit with me.  Patient is currently employed part time. Patient attended the session with step father , who they agreed to have interview with. Patient prefers to be called: \"Samara\".    Interim History:    I last saw Samara Oropeza for outpatient psychiatry Return Visit on 2017.     During that appointment, we Continue Intuniv (guanfacine) 3 mg two times per day per primary care provider.    Continue Elavil (amitriptyline) 50 mg by mouth daily at bedtime.     Continue Ativan (lorazepam) as needed for anxiety per primary care provider.     Increase Buspar (buspirone) 15 mg by mouth 2 times per day. If needed, may consider dose increase.     Start Vistaril/Atarax (hydroxyzine) 25- 50 mg by mouth up to 4 times daily for anxiety.      Current medications include:   Current Outpatient Prescriptions   Medication Sig     hydrOXYzine (ATARAX) 25 MG tablet Take 1-2 tablets (25-50 mg) by mouth every 6 hours as needed for anxiety     guanFACINE (TENEX) 1 MG tablet Take 3 tablets (3 mg) by mouth twice daily morning and evening.     methylPREDNISolone (MEDROL DOSEPAK) 4 " MG tablet Take 2 tablets (8 mg) by mouth See Admin Instructions follow package directions     ondansetron (ZOFRAN-ODT) 8 MG ODT tab Take 1 tablet (8 mg) by mouth every 8 hours as needed for nausea     ONABOTULINUMTOXINA IJ Inject 165 Units as directed once Lot# /C3  Expiration: 04/2020     Colchicine 0.6 MG CAPS Take 0.6 mg by mouth See Admin Instructions 2 capsules in AM and 1 capsule in PM     order for DME Post op shoe     diphenhydrAMINE (BENADRYL) 25 MG tablet Take 1 tablet (25 mg) by mouth every 8 hours as needed for allergies     linaclotide (LINZESS) 290 MCG capsule Take 1 capsule (290 mcg) by mouth every morning (before breakfast)     lactulose 20 GM/30ML SOLN Take 30 mLs by mouth 3 times daily as needed     sucralfate (CARAFATE) 1 GM/10ML suspension Take 10 mLs (1 g) by mouth 4 times daily     diclofenac (VOLTAREN) 1 % GEL topical gel Apply 2-4 grams to affected area(s) up to 4 times per day as needed. This is an anti-inflammatory medication.     aspirin (ASPIRIN CHILDRENS) 81 MG chewable tablet Take 81 mg by mouth as needed      dicyclomine (BENTYL) 20 MG tablet Take 1 tablet (20 mg) by mouth 4 times daily as needed     amitriptyline (ELAVIL) 25 MG tablet Take 1 tablet (25 mg) by mouth At Bedtime (Patient taking differently: Take 50 mg by mouth At Bedtime )     omeprazole (PRILOSEC) 40 MG capsule Take 1 capsule (40 mg) by mouth daily     EPINEPHrine (EPIPEN 2-EAMON) 0.3 MG/0.3ML injection Inject 0.3 mLs (0.3 mg) into the muscle as needed for anaphylaxis     apremilast (OTEZLA) 30 MG tablet 20 mg in AM and 30mg in PM daily X 2 weeks and then 30mg twice daily.     norgestimate-ethinyl estradiol (ORTHO-CYCLEN, SPRINTEC) 0.25-35 MG-MCG per tablet Take 1 tablet by mouth daily     albuterol (PROAIR HFA/PROVENTIL HFA/VENTOLIN HFA) 108 (90 BASE) MCG/ACT Inhaler Inhale 2 puffs into the lungs every 6 hours as needed for shortness of breath / dyspnea or wheezing     promethazine (PHENERGAN) 25 MG tablet Take  0.5-1 tablets (12.5-25 mg) by mouth every 6 hours as needed for nausea     Magic Mouthwash (FV std formula) lidocaine visc 2% 2.5mL/5mL & maalox/mylanta w/ simeth 2.5mL/5mL & diphenhydrAMINE 5mg/5mL Swish and swallow 10 mLs in mouth every 6 hours as needed for mouth sores     clobetasol (TEMOVATE) 0.05 % cream Apply topically 2 times daily     botulinum toxin type A (BOTOX) 100 UNITS injection Inject 200 Units into the muscle every 3 months     hyoscyamine (ANASPAZ,LEVSIN) 0.125 MG tablet Take 1-2 tablets (125-250 mcg) by mouth every 4 hours as needed for cramping     Aspirin-Acetaminophen-Caffeine (EXCEDRIN MIGRAINE PO) Take by mouth as needed      hypromellose (GENTEAL) 0.3 % SOLN 1 drop every hour as needed     betamethasone valerate (VALISONE) 0.1 % cream Apply topically 2 times daily     carbamide peroxide (DEBROX) 6.5 % otic solution 5 drops 2 times daily     Lactase (LACTAID PO) Take by mouth daily     lidocaine (XYLOCAINE) 2 % jelly Apply 1 Tube topically daily Reported on 2017     triamcinolone (KENALOG) 0.1 % cream Apply sparingly to oral ulcers three times daily for 14 days as needed.     [DISCONTINUED] guanFACINE (TENEX) 1 MG tablet Take 3 tablets (3 mg) by mouth twice daily morning and evening.     LORazepam (ATIVAN) 1 MG tablet Take 0.5 tablets (0.5 mg) by mouth 2 times daily as needed for other (abdominal pain) Do not operate a vehicle after taking this medication (Patient not taking: Reported on 10/9/2017)     meclizine (ANTIVERT) 25 MG tablet Take 1 tablet (25 mg) by mouth every 6 hours as needed for dizziness (Patient not taking: Reported on 10/9/2017)     No current facility-administered medications for this visit.        The [place] Prescription Monitoring Program has been reviewed and there are no concerns about diversionary activity for controlled substances at this time.      LOU MAN  Search Criteria: Last Name 'lou' and First Name 'gretchen' and  = '[birth year]' and Request  Period = '10/09/16' to  '10/09/17' - 4 out of 4 Recipients Selected.    Fill Date Product, Str, Form Qty Days Pt ID Prescriber Written RX# N/R* Pharm **MED+  ---------- -------------------------------- ------ ---- --------- ---------- ---------- ------------ ----- --------- ------  06/23/2017 LORAZEPAM 1 MG TABLET 90.00 90 [id number] 06/23/2017 [id number] N [id number] 00.0  04/24/2017 LORAZEPAM 1 MG TABLET 90.00 45 [id number] 04/24/2017 [id number] N [id number] 00.0  03/21/2017 LORAZEPAM 1 MG TABLET 90.00 30 [id number] 03/21/2017 [id number] N [id number] 00.0  01/17/2017 LORAZEPAM 1 MG TABLET 90.00 15 [id number] 01/17/2017 [id number] N [id number] 00.0  11/18/2016 LORAZEPAM 1 MG TABLET 75.00 13 [id number] 11/16/2016 [id number] N [id number] 00.0    *N/R N=New R=Refill  +MED Daily    Prescribers for prescriptions listed  ----------------------------------------------------------------------------------------------------------------------------------  [id number] CYNTHIA ESCALANTE; [facility], [address] [facility], [address], [facility] [address]  [id number] ARMAND LARA [facility]; [address] [place] [address]    Pharmacies that dispensed prescriptions listed  ----------------------------------------------------------------------------------------------------------------------------------  [id number] [facility] # [id number], [address] [facility] [address],  [id number] [facility] # [id number], [address] [place] [address],     I was able to review most recent Primary Care Provider, specialty provider, and therapy visit notes that I have access to.  Reviewed recent Neurology consult- Patient has upcoming EEG and further evaluation. Patient is seeing her neurologist   Patient reports that she is no longer seeing her therapist and reports this was not a good fit.   Patient reports there was a concern about Ativan  "(lorazepam) dosing after she was in the Emergency Department or urgent care due to anxiety. There was some incident where a PCP would not allow patient to get refills and there was a recommendation that she go to treatment as she is addicted to medication? Patient reports that she was given a new RX that she can take 1 mg 4 times per day, but this is not consistent with [place]  or the recent RX in [facility]. Patient ran out of medication. Denies withdrawal effects.     Miltonheber Oropeza reports mood has been: \"all over the place\" always irritated, but more easily agitated. No extreme changes. Boyienben's family reports that they have noticed she was more sad recently. Is upset that some of her family reports that she is \"faking all of my illnesses\".   Anxiety has been: \"really really high\" some panic attacks. Has ongoing anxiety about health.   Sleep has been: \"a little bit better\" nightmares every night since childhood  PHQ9 and GAD7 scores were reviewed today. No significant changes or improvements in months  Medication side effects: Side effect from Buspar (buspirone) unknown and was stopped   Current stressors include: Occupational Difficulties, Medical Difficulties, Financial Difficulties, Recent Moving, Family Relationships  Coping mechanisms and supports include: Therapy, Animal Rescue    Previous medication trials include but not limited to:  Vistaril/Atarax (hydroxyzine)  Intuniv (guanfacine)  Elavil (amitriptyline)   Ativan (lorazepam)  Buspar (buspirone)   Neurontin (gabapentin)   Melatonin     Past Medical History:   Diagnosis Date     Anxiety      Arthritis      Behcet's disease (H)      Cervical adenitis May 2010     Chronic abdominal pain      Constipation, chronic 1994     Gastro-oesophageal reflux disease      Gastroparesis      Migraines      Neuromuscular disorder (H)     fibramyalgia     Palpitations      Seizure (H)      Seizures (H)     unknown etiology     Syncope      Tourette's       has a past " "medical history of Anxiety; Arthritis; Behcet's disease (H); Cervical adenitis (May 2010); Chronic abdominal pain; Constipation, chronic (1994); Gastro-oesophageal reflux disease; Gastroparesis; Migraines; Neuromuscular disorder (H); Palpitations; Seizure (H); Seizures (H); Syncope; and Tourette's.    Social History:  Current Living situation:  [place], [place] with boyfriend's parents until place is ready in [place]. Feels safe at home.  Current use of drugs or alcohol: Alcohol recent binge drinking episode of 10 shots and got sick, ambulance was called, so drinking less alcohol lately.   Tobacco use: No  Caffeine: Yes- 2 products per day  Recovery Programming Involvement: Not Applicable    Vital Signs:   /56 (BP Location: Right arm, Patient Position: Sitting, Cuff Size: Adult Large)  Pulse 110  Temp 97.9  F (36.6  C) (Oral)  Ht 5' 3\" (1.6 m)  Wt 145 lb (65.8 kg)  LMP 09/28/2017 (Exact Date)  SpO2 100%  BMI 25.69 kg/m2    Labs:  Most recent laboratory results reviewed and no new labs.    Review of Systems:  10 systems (general, cardiovascular, respiratory, eyes, ENT, endocrine, GI, , M/S, neurological) were reviewed. Most pertinent finding(s) is/are: chronic abdominal pain, migraines- has ongoing Botox injections- caused temporary paralysis, neurology seizure like episodes - most recent 2 weeks ago, ongoing nausea. The remaining systems are all unremarkable.     Mental Status Examination:  Appearance:  awake, alert, adequately groomed, appeared stated age, wears makeup, on time, with step father  Attitude:  cooperative   Eye Contact:  adequate  Gait and Station: Normal, No assistive Devices used and No dizziness or falls  Psychomotor Behavior:  no evidence of tardive dyskinesia, dystonia, or tics  Oriented to:  time, person, and place  Attention Span and Concentration:  Normal  Speech:  clear, coherent, regular rate, regular rhythm and fluent  Mood:   \"uip and down\"  Affect:  mood " congruent, brighter than last visit  Associations:  no loose associations  Thought Process:  logical, linear and goal oriented  Thought Content:  no evidence of suicidal ideation or homicidal ideation, no evidence of psychotic thought and Appropriate to Interview  Recent and Remote Memory:  Intact to interview. Not formally assessed. No amnesia.  Fund of Knowledge: appropriate  Insight:  fair  Judgment:  intact- adequate for safety  Impulse Control:  intact      Suicide Risk Assessment:  Today Samara Oropeza reports many psychosocial stressors, anxiety, and mood lability. In addition, there are notable risk factors for self-harm, including age, anxiety, family history and comorbid medical condition of chronic pain. However, risk is mitigated by commitment to family, absence of past attempts, ability to volunteer a safety plan, history of seeking help when needed, future oriented, denies suicidal intent or plan and denies homicidal ideation, intent, or plan. Therefore, based on all available evidence including the factors cited above, Samara Oropeza does not appear to be at imminent risk for self-harm, does not meet criteria for a 72-hr hold, and therefore remains appropriate for ongoing outpatient level of care.  A thorough assessment of risk factors related to suicide and self-harm have been reviewed and are noted above. Local community safety resources reviewed and printed for patient to use if needed. There was no deceit detected, and the patient presented in a manner that was believable.       DSM5  Diagnosis:  296.32 (F33.1) Major Depressive Disorder, Recurrent Episode, Moderate With anxious distress  300.02 (F41.1) Generalized Anxiety Disorder  Post-traumatic stress disorder (PTSD)   Rule out Somatic Symptom Disorder    Medical comorbidities include:   Patient Active Problem List    Diagnosis Date Noted     Convulsions, unspecified convulsion type (H) 10/03/2017     Priority: Medium     Transient  alteration of awareness 10/03/2017     Priority: Medium     Chronic pain syndrome 07/27/2017     Priority: Medium     Major depressive disorder, recurrent episode, moderate (H) 06/27/2017     Priority: Medium     Cervical pain 05/02/2017     Priority: Medium     Acute left ankle pain 03/31/2017     Priority: Medium     Cervical dystonia 03/28/2017     Priority: Medium     PTSD (post-traumatic stress disorder) 01/17/2017     Priority: Medium     Left knee pain 10/20/2016     Priority: Medium     Patellofemoral instability 10/20/2016     Priority: Medium     Fibromyalgia 08/04/2016     Priority: Medium     Rheumatoid arthritis of multiple sites without rheumatoid factor (H) 08/04/2016     Priority: Medium     Raynaud's disease without gangrene 08/04/2016     Priority: Medium     Chronic abdominal pain 08/04/2016     Priority: Medium     Palpitations 01/12/2016     Priority: Medium     On colchicine therapy 10/30/2015     Priority: Medium     Spell of shaking 05/06/2015     Priority: Medium     Migraine 02/04/2015     Priority: Medium     Problem list name updated by automated process. Provider to review       Behcet's disease (H) 12/10/2014     Priority: Medium     Headaches due to old head injury 11/12/2013     Priority: Medium     Milk protein intolerance 10/11/2013     Priority: Medium     Concussion 02/13/2013     Priority: Medium     Jan 2013, with prolonged recovery- followed by sports med         Knee pain 01/03/2013     Priority: Medium     TAHIR (generalized anxiety disorder) 06/25/2009     Priority: Medium     Tics - Tourette syndrome 05/18/2009     Priority: Medium     Followed by psychotherapy. Symptoms well managed. Originally diagnosed at [facility] neurolog[facility]. (Dr. Simpson)           IBS (irritable bowel syndrome) 05/18/2009     Priority: Medium     Seasonal allergic rhinitis 05/18/2009     Priority: Medium       Psychosocial & Contextual Factors:  Medical Comorbidities, Occupational Difficulties, Financial  Difficulties    Assessment:  Samara Oropeza reports increased anxiety, sleep disturbances, and low mood. She appears clearer today with less medications.  Medication side effects and alternatives were reviewed. Health promotion activities recommended and reviewed today. All questions addressed. Education and counseling completed regarding risks and benefits of medications and psychotherapy options.    Recommend a Controlled Substance Agreement for all controlled medications.     Continue to monitor nightmares and may augment with cyproheptadine or other sleeping medication such as Desyrel/Olepto (trazodone) or Remeron (mirtazapine). Cymbalta (duloxetine) may also be an option for anxiety, depression, and chronic pain.      Consider sleep consultation in future to rule out sleep apnea and other parasomnias.    Patient reports she is not taking the Ativan (lorazepam) or Buspar (buspirone) - Buspar (buspirone) discontinued by rheumatlogist on 9/19 and there is no note why. Patient reports that she had a bad reaction to this and is not sure what it was?    She takes Vistaril/Atarax (hydroxyzine) 25 mg in AM every day. Has taken 25- 50 mg of Vistaril/Atarax (hydroxyzine) at a time when having increased anxiety and that has worked for her.     Has been off of Ativan (lorazepam) for a few weeks and I do not recommend restarting at this time with upcoming neurology assessments and using other options for anxiety. Patient reviews frustration with the covering PCP team and their unwillingness to prescribe Ativan (lorazepam) after her PCP was out of clinic on leave. Discussed that I was not involved in this discussion and just heard about it today. I reviewed all available EPIC notes on this client and reviewed my previous clinic notes from our visits together. Active listening provided to patient today. I defer to pain specialists for other options for chronic pain management and they can restart this medication if  needed.     Future oriented with family celebrations and events. Has ongoing anxiety about unknown of her health status. Discussed meeting with a new therapist that may be a better fit.       Treatment Plan:    Continue Intuniv (guanfacine) 3 mg two times per day per primary care provider for Tourette's Syndrome.    Continue Elavil (amitriptyline) 50 mg by mouth daily at bedtime for sleep and pain.     Continue Vistaril/Atarax (hydroxyzine) 25- 50 mg by mouth up to 4 times daily for anxiety.     Continue all other medications as reviewed per electronic medical record today.     Safety plan reviewed. To the Emergency Department as needed or call after hours crisis line at [phone] or [phone].     To schedule individual or family therapy, call [facility] at [phone]. Or get another referral to therapist per pain management     Schedule an appointment with me in 2 months or sooner as needed. Call [facility] at [phone] to schedule.    Follow up with primary care provider as planned or for acute medical concerns.    Call the psychiatric nurse line with medication questions or concerns at [phone].    MyChart may be used to communicate with your provider, but this is not intended to be used for emergencies.    Administrative Billing:   Time spent with patient and step father was 30 minutes and greater than 50% of time or 20 minutes was spent in counseling and coordination of care.    Patient Status:  Patient will continue to be seen for ongoing consultation and stabilization.    Signed:   Cata Hurst, PhD, APRN, CNP   Psychiatry

## 2017-10-09 NOTE — TELEPHONE ENCOUNTER
I called and left a message for shannan. I gave her the 3 mg in the extended release dose, which means she will take two 3 mg tablets at bedtime. I have called this medication into the [facility] pharmacy.

## 2017-10-09 NOTE — NURSING NOTE
"Chief Complaint   Patient presents with     Consult     Return pt.       Initial /56 (BP Location: Right arm, Patient Position: Sitting, Cuff Size: Adult Large)  Pulse 110  Temp 97.9  F (36.6  C) (Oral)  Ht 5' 3\" (1.6 m)  Wt 145 lb (65.8 kg)  LMP 09/28/2017 (Exact Date)  SpO2 100%  BMI 25.69 kg/m2 Estimated body mass index is 25.69 kg/(m^2) as calculated from the following:    Height as of this encounter: 5' 3\" (1.6 m).    Weight as of this encounter: 145 lb (65.8 kg).  Medication Reconciliation: complete   Kat Ramos MA      "

## 2017-10-09 NOTE — Clinical Note
Patient has concerns about Ativan (lorazepam) use. More details in my note. Has upcoming specialist appointments. Cata

## 2017-10-09 NOTE — MR AVS SNAPSHOT
After Visit Summary   10/9/2017    Samara Oropeza    MRN: [id number]           Patient Information     Date Of Birth          1995        Visit Information        Provider Department      10/9/2017 2:15 PM Cata Hurst NP [facility]        Today's Diagnoses     TAHIR (generalized anxiety disorder)    -  1    Major depressive disorder, recurrent episode, moderate (H)          Care Instructions    Treatment Plan:    Continue Intuniv (guanfacine) 3 mg two times per day per primary care provider for Tourette's Syndrome.    Continue Elavil (amitriptyline) 50 mg by mouth daily at bedtime for sleep and pain.     Continue Vistaril/Atarax (hydroxyzine) 25- 50 mg by mouth up to 4 times daily for anxiety.     Continue all other medications as reviewed per electronic medical record today.     Safety plan reviewed. To the Emergency Department as needed or call after hours crisis line at [phone] or [phone].     To schedule individual or family therapy, call [facility] at [phone]. Or get another referral to therapist per pain management     Schedule an appointment with me in 2 months or sooner as needed. Call [facility] at [phone] to schedule.    Follow up with primary care provider as planned or for acute medical concerns.    Call the psychiatric nurse line with medication questions or concerns at [phone].    MyChart may be used to communicate with your provider, but this is not intended to be used for emergencies.          Follow-ups after your visit        Follow-up notes from your care team     Return in about 8 weeks (around 12/4/2017).      Your next 10 appointments already scheduled     Oct 10, 2017 11:45 AM CDT   Return Visit with Emily Rollins, PT   [facility] ([facility])    [address]  [facility] [address]  [facility] [id number] [phone]            Oct 11, 2017  1:00 PM CDT    (Arrive by 12:45 PM)   Return Visit with EVI Vizcaino CNP   [facility] ([facility])    [address]  4th Floor  [facility] [address] [phone]            Oct 12, 2017  2:30 PM CDT   Return Visit with Kimo Hudson LP   [facility] ([facility])    [address]  [facility] [address] [phone]            Oct 12, 2017  3:30 PM CDT   Return Visit with EVI Dahl CNP   [facility] ([facility])    [address]  [address]  [facility] [address] [phone]            Oct 18, 2017 11:30 AM CDT   Return Visit with Austen Marquez MD   [facility] ([facility])    [address] [id number] [phone]            Oct 25, 2017  7:00 AM CDT   (Arrive by 6:45 AM)   MR BRAIN W/O CONTRAST with [id number]   [facility] ([facility])    [address]  1st Floor  [facility] [address] [phone]           Take your medicines as usual, unless your doctor tells you not to. Bring a list of your current medicines to your exam (including vitamins, minerals and over-the-counter drugs). Also bring the results of similar scans you may have had.  Please remove any body piercings and hair extensions before you arrive.  Follow your doctor s orders. If you do not, we may have to postpone your exam.  You will not have contrast for this exam. You do not need to do anything special to prepare.  The MRI machine uses a strong magnet. Please wear clothes without metal (snaps, zippers). A sweatsuit works well, or we may give you a hospital gown.   **IMPORTANT** THE INSTRUCTIONS BELOW ARE ONLY FOR THOSE PATIENTS WHO HAVE BEEN TOLD THEY WILL RECEIVE SEDATION OR GENERAL ANESTHESIA DURING THEIR MRI PROCEDURE:  IF YOU WILL RECEIVE  SEDATION (take medicine to help you relax during your exam):   You must get the medicine from your doctor before you arrive. Bring the medicine to the exam. Do not take it at home.   Arrive one hour early. Bring someone who can take you home after the test. Your medicine will make you sleepy. After the exam, you may not drive, take a bus or take a taxi by yourself.   No eating 8 hours before your exam. You may have clear liquids up until 4 hours before your exam. (Clear liquids include water, clear tea, black coffee and fruit juice without pulp.)  IF YOU WILL RECEIVE ANESTHESIA (be asleep for your exam):   Arrive 1 1/2 hours early. Bring someone who can take you home after the test. You may not drive, take a bus or take a taxi by yourself.   No eating 8 hours before your exam. You may have clear liquids up until 4 hours before your exam. (Clear liquids include water, clear tea, black coffee and fruit juice without pulp.)   You will spend four to five hours in the recovery room.  Please call the Imaging Department at your exam site with any questions.            Oct 25, 2017  8:30 AM CDT   (Arrive by 8:15 AM)   In Lab Video Visit with  EEG TECH 2   [facility] ([facility])    [address] [place] [id number] [phone]            Dec 06, 2017  3:00 PM CST   (Arrive by 2:45 PM)   Return Botox with Frederick Bender MD   [facility] ([facility])    [address] [place] [id number] [phone]            Feb 13, 2018  2:30 PM CST   (Arrive by 2:15 PM)   Return Seizure with Sigifredo Flores MD   [facility]rology ([facility])    [address] [place] [id number] [phone]              Who to contact     If you have questions or need follow up information about today's clinic visit or your schedule please contact  "[facility] directly at [phone].  Normal or non-critical lab and imaging results will be communicated to you by MyChart, letter or phone within 4 business days after the clinic has received the results. If you do not hear from us within 7 days, please contact the clinic through MyChart or phone. If you have a critical or abnormal lab result, we will notify you by phone as soon as possible.  Submit refill requests through [employer] or call your pharmacy and they will forward the refill request to us. Please allow 3 business days for your refill to be completed.          Additional Information About Your Visit        [facility] [employer] Information     [employer] lets you send messages to your doctor, view your test results, renew your prescriptions, schedule appointments and more. To sign up, go to [email or web address] . Click on \"Log in\" on the left side of the screen, which will take you to the Welcome page. Then click on \"Sign up Now\" on the right side of the page.     You will be asked to enter the access code listed below, as well as some personal information. Please follow the directions to create your username and password.     Your access code is: [email or web address]  Expires: 10/29/2017 10:26 AM     Your access code will  in 90 days. If you need help or a new code, please call your [place] clinic or [phone].        Care EveryWhere ID     This is your Care EveryWhere ID. This could be used by other organizations to access your [place] medical records  [id number]        Your Vitals Were     Pulse Temperature Height Last Period Pulse Oximetry BMI (Body Mass Index)    110 97.9  F (36.6  C) (Oral) 5' 3\" (1.6 m) 2017 (Exact Date) 100% 25.69 kg/m2       Blood Pressure from Last 3 Encounters:   10/09/17 108/56   10/03/17 110/75   17 102/68    Weight from Last 3 Encounters:   10/09/17 145 lb (65.8 kg)   10/03/17 143 lb 12.8 oz (65.2 kg)   17 143 lb (64.9 kg)              Today, you " had the following     No orders found for display         Today's Medication Changes          These changes are accurate as of: 10/9/17  3:00 PM.  If you have any questions, ask your nurse or doctor.               These medicines have changed or have updated prescriptions.        Dose/Directions    amitriptyline 25 MG tablet   Commonly known as:  ELAVIL   This may have changed:  how much to take   Used for:  Atypical chest pain, Insomnia, unspecified type        Dose:  25 mg   Take 1 tablet (25 mg) by mouth At Bedtime   Quantity:  45 tablet   Refills:  5            Where to get your medicines      These medications were sent to [facility] - [address], [facility], [facility] [address]     Phone:  [phone]     hydrOXYzine 25 MG tablet                Primary Care Provider Office Phone # Fax #    Sonja Finleywendy Abreu, APRN [facility] [phone]       [address]  [facility] [address]        Equal Access to Services     NABIL GALEANO : Hadii malcolm ku hadasho Soomaali, waaxda luqadaha, qaybta kaalmada adeegyada, waxay idiin haysophian maddie rodriguez . So [facility] [phone].    ATENCIÓN: Si habla español, tiene a livingston disposición servicios gratuitos de asistencia lingüística. Jesus al [phone].    We comply with applicable federal civil rights laws and [place] laws. We do not discriminate on the basis of race, color, national origin, age, disability, sex, sexual orientation, or gender identity.            Thank you!     Thank you for choosing [facility]  for your care. Our goal is always to provide you with excellent care. Hearing back from our patients is one way we can continue to improve our services. Please take a few minutes to complete the written survey that you may receive in the mail after your visit with us. Thank you!             Your Updated Medication List - Protect others around you: Learn how to  safely use, store and throw away your medicines at www.disposemymeds.org.          This list is accurate as of: 10/9/17  3:00 PM.  Always use your most recent med list.                   Brand Name Dispense Instructions for use Diagnosis    albuterol 108 (90 BASE) MCG/ACT Inhaler    PROAIR HFA/PROVENTIL HFA/VENTOLIN HFA    1 Inhaler    Inhale 2 puffs into the lungs every 6 hours as needed for shortness of breath / dyspnea or wheezing    Atypical chest pain       amitriptyline 25 MG tablet    ELAVIL    45 tablet    Take 1 tablet (25 mg) by mouth At Bedtime    Atypical chest pain, Insomnia, unspecified type       apremilast 30 MG tablet    OTEZLA    60 tablet    20 mg in AM and 30mg in PM daily X 2 weeks and then 30mg twice daily.    Behcet's disease (H)       ASPIRIN CHILDRENS 81 MG chewable tablet   Generic drug:  aspirin      Take 81 mg by mouth as needed        betamethasone valerate 0.1 % cream    VALISONE     Apply topically 2 times daily        * botulinum toxin type A 100 UNITS injection    BOTOX    200 Units    Inject 200 Units into the muscle every 3 months    Cervical dystonia       * ONABOTULINUMTOXINA IJ      Inject 165 Units as directed once Lot# /[id number] Expiration: 04/2020        carbamide peroxide 6.5 % otic solution    DEBROX     5 drops 2 times daily        clobetasol 0.05 % cream    TEMOVATE    60 g    Apply topically 2 times daily    Folliculitis       Colchicine 0.6 MG Caps     90 capsule    Take 0.6 mg by mouth See Admin Instructions 2 capsules in AM and 1 capsule in PM    Behcet's syndrome (H)       diclofenac 1 % Gel topical gel    VOLTAREN    100 g    Apply 2-4 grams to affected area(s) up to 4 times per day as needed. This is an anti-inflammatory medication.    Polyarthralgia       dicyclomine 20 MG tablet    BENTYL    60 tablet    Take 1 tablet (20 mg) by mouth 4 times daily as needed    Abdominal cramping       diphenhydrAMINE 25 MG tablet    BENADRYL    30 tablet    Take 1 tablet (25 mg)  by mouth every 8 hours as needed for allergies        EPINEPHrine 0.3 MG/0.3ML injection 2-pack    EPIPEN 2-EAMON    0.6 mL    Inject 0.3 mLs (0.3 mg) into the muscle as needed for anaphylaxis    Hx of bee sting allergy       EXCEDRIN MIGRAINE PO      Take by mouth as needed        guanFACINE 1 MG tablet    TENEX    180 tablet    Take 3 tablets (3 mg) by mouth twice daily morning and evening.    Tic       hydrOXYzine 25 MG tablet    ATARAX    60 tablet    Take 1-2 tablets (25-50 mg) by mouth every 6 hours as needed for anxiety    TAHIR (generalized anxiety disorder)       hyoscyamine 0.125 MG tablet    ANASPAZ/LEVSIN    40 tablet    Take 1-2 tablets (125-250 mcg) by mouth every 4 hours as needed for cramping    Abdominal pain, generalized       hypromellose 0.3 % Soln ophthalmic solution    GENTEAL     1 drop every hour as needed        LACTAID PO      Take by mouth daily        lactulose 20 GM/30ML Soln     300 mL    Take 30 mLs by mouth 3 times daily as needed        lidocaine 2 % topical gel    XYLOCAINE     Apply 1 Tube topically daily Reported on 4/21/2017        lidocaine visc 2% 2.5mL/5mL & maalox/mylanta w/ simeth 2.5mL/5mL & diphenhydrAMINE 5mg/5mL Susp suspension    [facility]    1 Bottle    Swish and swallow 10 mLs in mouth every 6 hours as needed for mouth sores    Behcet's syndrome (H)       linaclotide 290 MCG capsule    LINZESS    90 capsule    Take 1 capsule (290 mcg) by mouth every morning (before breakfast)    Irritable bowel syndrome       LORazepam 1 MG tablet    ATIVAN    90 tablet    Take 0.5 tablets (0.5 mg) by mouth 2 times daily as needed for other (abdominal pain) Do not operate a vehicle after taking this medication    Chronic abdominal pain       meclizine 25 MG tablet    ANTIVERT    30 tablet    Take 1 tablet (25 mg) by mouth every 6 hours as needed for dizziness    Nausea       methylPREDNISolone 4 MG tablet    MEDROL DOSEPAK    21 tablet    Take 2 tablets (8 mg) by mouth See  Admin Instructions follow package directions    Behcet's disease (H)       norgestimate-ethinyl estradiol 0.25-35 MG-MCG per tablet    ORTHO-CYCLEN, SPRINTEC    84 tablet    Take 1 tablet by mouth daily    General counseling for prescription of oral contraceptives       omeprazole 40 MG capsule    priLOSEC    90 capsule    Take 1 capsule (40 mg) by mouth daily    Gastroesophageal reflux disease, esophagitis presence not specified       ondansetron 8 MG ODT tab    ZOFRAN-ODT    60 tablet    Take 1 tablet (8 mg) by mouth every 8 hours as needed for nausea    Non-intractable vomiting with nausea, unspecified vomiting type, Hematemesis, presence of nausea not specified       order for DME     1 Device    Post op shoe    Contusion of right foot, initial encounter       promethazine 25 MG tablet    PHENERGAN    20 tablet    Take 0.5-1 tablets (12.5-25 mg) by mouth every 6 hours as needed for nausea    Intractable chronic migraine without aura and without status migrainosus       sucralfate 1 GM/10ML suspension    CARAFATE    1200 mL    Take 10 mLs (1 g) by mouth 4 times daily    Bile reflux gastritis, Nausea       triamcinolone 0.1 % cream    KENALOG    15 g    Apply sparingly to oral ulcers three times daily for 14 days as needed.    Behcet's syndrome (H)       * Notice:  This list has 2 medication(s) that are the same as other medications prescribed for you. Read the directions carefully, and ask your doctor or other care provider to review them with you.

## 2017-10-09 NOTE — PATIENT INSTRUCTIONS
Treatment Plan:    Continue Intuniv (guanfacine) 3 mg two times per day per primary care provider for Tourette's Syndrome.    Continue Elavil (amitriptyline) 50 mg by mouth daily at bedtime for sleep and pain.     Continue Vistaril/Atarax (hydroxyzine) 25- 50 mg by mouth up to 4 times daily for anxiety.     Continue all other medications as reviewed per electronic medical record today.     Safety plan reviewed. To the Emergency Department as needed or call after hours crisis line at [phone] or [phone].     To schedule individual or family therapy, call [facility] at [phone]. Or get another referral to therapist per pain management     Schedule an appointment with me in 2 months or sooner as needed. Call [facility] at [phone] to schedule.    Follow up with primary care provider as planned or for acute medical concerns.    Call the psychiatric nurse line with medication questions or concerns at [phone].    [employer] may be used to communicate with your provider, but this is not intended to be used for emergencies.

## 2017-10-10 ENCOUNTER — OFFICE VISIT (OUTPATIENT)
Dept: PALLIATIVE MEDICINE | Facility: CLINIC | Age: 22
End: 2017-10-10
Payer: COMMERCIAL

## 2017-10-10 DIAGNOSIS — G89.4 CHRONIC PAIN SYNDROME: Primary | ICD-10-CM

## 2017-10-10 PROCEDURE — 97535 SELF CARE MNGMENT TRAINING: CPT | Mod: GP | Performed by: PHYSICAL THERAPIST

## 2017-10-10 PROCEDURE — 97110 THERAPEUTIC EXERCISES: CPT | Mod: GP | Performed by: PHYSICAL THERAPIST

## 2017-10-10 ASSESSMENT — ANXIETY QUESTIONNAIRES: GAD7 TOTAL SCORE: 19

## 2017-10-10 ASSESSMENT — PATIENT HEALTH QUESTIONNAIRE - PHQ9: SUM OF ALL RESPONSES TO PHQ QUESTIONS 1-9: 17

## 2017-10-10 NOTE — MR AVS SNAPSHOT
After Visit Summary   10/10/2017    Samara Oropeza    MRN: [id number]           Patient Information     Date Of Birth          1995        Visit Information        Provider Department      10/10/2017 11:45 AM Emily Rollins, TAINA [facility]        Today's Diagnoses     Chronic pain syndrome    -  1       Follow-ups after your visit        Your next 10 appointments already scheduled     Oct 18, 2017 11:30 AM CDT   Return Visit with Austen Marquez MD   [facility] ([facility])    [address] [id number] [phone]            Oct 19, 2017  2:30 PM CDT   Return Visit with Kimo Hudson LP   [facility] ([facility])    [address]  [facility] [id number] [phone]            Oct 23, 2017  3:30 PM CDT   Return Visit with EVI Dahl CNP   [facility] ([facility])    [address]  [facility] [address] [phone]            Oct 24, 2017 11:00 AM CDT   PHYSICAL with EVI Cardona CNP   [facility] ([facility])    [address] [place] [id number] [phone]            Oct 25, 2017  7:00 AM CDT   (Arrive by 6:45 AM)   MR BRAIN W/O CONTRAST with [id number]   [facility] ([facility])    [address] [id number] [phone]           Take your medicines as usual, unless your doctor tells you not to. Bring a list of your current medicines to your exam (including vitamins, minerals and over-the-counter drugs). Also bring the results of similar scans you may have had.  Please remove any body piercings and hair extensions before you arrive.  Follow your doctor s orders. If you do not, we may have to postpone your exam.  You will not  have contrast for this exam. You do not need to do anything special to prepare.  The MRI machine uses a strong magnet. Please wear clothes without metal (snaps, zippers). A sweatsuit works well, or we may give you a hospital gown.   **IMPORTANT** THE INSTRUCTIONS BELOW ARE ONLY FOR THOSE PATIENTS WHO HAVE BEEN TOLD THEY WILL RECEIVE SEDATION OR GENERAL ANESTHESIA DURING THEIR MRI PROCEDURE:  IF YOU WILL RECEIVE SEDATION (take medicine to help you relax during your exam):   You must get the medicine from your doctor before you arrive. Bring the medicine to the exam. Do not take it at home.   Arrive one hour early. Bring someone who can take you home after the test. Your medicine will make you sleepy. After the exam, you may not drive, take a bus or take a taxi by yourself.   No eating 8 hours before your exam. You may have clear liquids up until 4 hours before your exam. (Clear liquids include water, clear tea, black coffee and fruit juice without pulp.)  IF YOU WILL RECEIVE ANESTHESIA (be asleep for your exam):   Arrive 1 1/2 hours early. Bring someone who can take you home after the test. You may not drive, take a bus or take a taxi by yourself.   No eating 8 hours before your exam. You may have clear liquids up until 4 hours before your exam. (Clear liquids include water, clear tea, black coffee and fruit juice without pulp.)   You will spend four to five hours in the recovery room.  Please call the Imaging Department at your exam site with any questions.            Oct 25, 2017  8:30 AM CDT   (Arrive by 8:15 AM)   In Lab Video Visit with  EEG TECH 2   [facility] [place] [facility] ([facility])    [address] [facility]  3rd Floor  [facility] [id number] [phone]            Oct 31, 2017  9:15 AM CDT   Return Visit with Emily Rollins, PT   [facility] ([facility])    [address] [place], [place] [address]  [facility] [address]  [facility] [id number] [phone]            Dec 06, 2017   3:00 PM CST   (Arrive by 2:45 PM)   Return Botox with Frederick Bender MD   [facility] ([facility])    [address] [facility] [address]  3rd Floor  [facility] [address] [phone]            Dec 12, 2017  2:45 PM CST   [facility] with Cata Hurst [facility]    [address] [place]  [address]  [facility] [address] [phone]            Jan 17, 2018  2:20 PM CST   (Arrive by 2:05 PM)   New Patient Visit with Estela Cuenca MD   [facility]ology [facility] ([facility])    [address] [facility]  4th Floor  [facility] [address] [phone]              Who to contact     If you have questions or need follow up information about today's clinic visit or your schedule please contact [facility] directly at [phone].  Normal or non-critical lab and imaging results will be communicated to you by MyChart, letter or phone within 4 business days after the clinic has received the results. If you do not hear from us within 7 days, please contact the clinic through inMarkethart or phone. If you have a critical or abnormal lab result, we will notify you by phone as soon as possible.  Submit refill requests through GetGifted or call your pharmacy and they will forward the refill request to us. Please allow 3 business days for your refill to be completed.          Additional Information About Your Visit        inMarkethart Information     GetGifted gives you secure access to your electronic health record. If you see a primary care provider, you can also send messages to your care team and make appointments. If you have questions, please call your primary care clinic.  If you do not have a primary care provider, please call [phone] and they will assist you.        Care EveryWhere ID     This is your Care EveryWhere ID. This could be used by  other organizations to access your [facility] medical records  [id number]        Your Vitals Were     Last Period                   09/28/2017 (Exact Date)            Blood Pressure from Last 3 Encounters:   10/17/17 114/80   10/17/17 134/83   10/13/17 132/88    Weight from Last 3 Encounters:   10/17/17 67.6 kg (149 lb)   10/13/17 65.8 kg (145 lb)   10/11/17 66.2 kg (146 lb)              We Performed the Following     SELF CARE MNGMENT TRAINING     THERAPEUTIC EXERCISES        Primary Care Provider Office Phone # Fax #    Sonja Winchester Brady, APRN [phone]       [address] [facility] [address]  [facility] [address]        Equal Access to Services     NABIL GALEANO : Hadii aad ku hadasho Soteddyali, waaxda luqadaha, qaybta kaalmada adeegyada, waxay idiin hayarlet rordiguez . So [facility] [phone].    ATENCIÓN: Si habla español, tiene a livingston disposición servicios gratuitos de asistencia lingüística. [facility] [phone].    We comply with applicable federal civil rights laws and [place] laws. We do not discriminate on the basis of race, color, national origin, age, disability, sex, sexual orientation, or gender identity.            Thank you!     Thank you for choosing [facility]  for your care. Our goal is always to provide you with excellent care. Hearing back from our patients is one way we can continue to improve our services. Please take a few minutes to complete the written survey that you may receive in the mail after your visit with us. Thank you!             Your Updated Medication List - Protect others around you: Learn how to safely use, store and throw away your medicines at www.disposemymeds.org.          This list is accurate as of: 10/10/17 11:59 PM.  Always use your most recent med list.                   Brand Name Dispense Instructions for use Diagnosis    albuterol 108 (90 BASE) MCG/ACT Inhaler    PROAIR HFA/PROVENTIL HFA/VENTOLIN HFA    1 Inhaler    Inhale 2  puffs into the lungs every 6 hours as needed for shortness of breath / dyspnea or wheezing    Atypical chest pain       apremilast 30 MG tablet    OTEZLA    60 tablet    20 mg in AM and 30mg in PM daily X 2 weeks and then 30mg twice daily.    Behcet's disease (H)       ASPIRIN CHILDRENS 81 MG chewable tablet   Generic drug:  aspirin      Take 81 mg by mouth as needed        betamethasone valerate 0.1 % cream    VALISONE     Apply topically 2 times daily        * botulinum toxin type A 100 UNITS injection    BOTOX    200 Units    Inject 200 Units into the muscle every 3 months    Cervical dystonia       * ONABOTULINUMTOXINA IJ      Inject 165 Units as directed once Lot# /[id number] Expiration: 04/2020        carbamide peroxide 6.5 % otic solution    DEBROX     5 drops 2 times daily        clobetasol 0.05 % cream    TEMOVATE    60 g    Apply topically 2 times daily    Folliculitis       Colchicine 0.6 MG Caps     90 capsule    Take 0.6 mg by mouth See Admin Instructions 2 capsules in AM and 1 capsule in PM    Behcet's syndrome (H)       diclofenac 1 % Gel topical gel    VOLTAREN    100 g    Apply 2-4 grams to affected area(s) up to 4 times per day as needed. This is an anti-inflammatory medication.    Polyarthralgia       dicyclomine 20 MG tablet    BENTYL    60 tablet    Take 1 tablet (20 mg) by mouth 4 times daily as needed    Abdominal cramping       diphenhydrAMINE 25 MG tablet    BENADRYL    30 tablet    Take 1 tablet (25 mg) by mouth every 8 hours as needed for allergies        EPINEPHrine 0.3 MG/0.3ML injection 2-pack    EPIPEN 2-EAMON    0.6 mL    Inject 0.3 mLs (0.3 mg) into the muscle as needed for anaphylaxis    Hx of bee sting allergy       EXCEDRIN MIGRAINE PO      Take by mouth as needed        guanFACINE 1 MG tablet    TENEX    180 tablet    Take 3 tablets (3 mg) by mouth twice daily morning and evening.    Tic       hydrOXYzine 25 MG tablet    ATARAX    60 tablet    Take 1-2 tablets (25-50 mg) by mouth  every 6 hours as needed for anxiety    TAHIR (generalized anxiety disorder)       hyoscyamine 0.125 MG tablet    ANASPAZ/LEVSIN    40 tablet    Take 1-2 tablets (125-250 mcg) by mouth every 4 hours as needed for cramping    Abdominal pain, generalized       hypromellose 0.3 % Soln ophthalmic solution    GENTEAL     1 drop every hour as needed        LACTAID PO      Take by mouth daily        lidocaine 2 % topical gel    XYLOCAINE     Apply 1 Tube topically daily Reported on 4/21/2017        lidocaine visc 2% 2.5mL/5mL & maalox/mylanta w/ simeth 2.5mL/5mL & diphenhydrAMINE 5mg/5mL Susp suspension    [facility]    1 Bottle    Swish and swallow 10 mLs in mouth every 6 hours as needed for mouth sores    Behcet's syndrome (H)       linaclotide 290 MCG capsule    LINZESS    90 capsule    Take 1 capsule (290 mcg) by mouth every morning (before breakfast)    Irritable bowel syndrome       meclizine 25 MG tablet    ANTIVERT    30 tablet    Take 1 tablet (25 mg) by mouth every 6 hours as needed for dizziness    Nausea       norgestimate-ethinyl estradiol 0.25-35 MG-MCG per tablet    ORTHO-CYCLEN, SPRINTEC    84 tablet    Take 1 tablet by mouth daily    General counseling for prescription of oral contraceptives       omeprazole 40 MG capsule    priLOSEC    90 capsule    Take 1 capsule (40 mg) by mouth daily    Gastroesophageal reflux disease, esophagitis presence not specified       ondansetron 8 MG ODT tab    ZOFRAN-ODT    60 tablet    Take 1 tablet (8 mg) by mouth every 8 hours as needed for nausea    Non-intractable vomiting with nausea, unspecified vomiting type, Hematemesis, presence of nausea not specified       promethazine 25 MG tablet    PHENERGAN    20 tablet    Take 0.5-1 tablets (12.5-25 mg) by mouth every 6 hours as needed for nausea    Intractable chronic migraine without aura and without status migrainosus       sucralfate 1 GM/10ML suspension    CARAFATE    1200 mL    Take 10 mLs (1 g) by mouth 4  times daily    Bile reflux gastritis, Nausea       triamcinolone 0.1 % cream    KENALOG    15 g    Apply sparingly to oral ulcers three times daily for 14 days as needed.    Behcet's syndrome (H)       * Notice:  This list has 2 medication(s) that are the same as other medications prescribed for you. Read the directions carefully, and ask your doctor or other care provider to review them with you.

## 2017-10-10 NOTE — PROGRESS NOTES
"Patient Name: Samara Oropeza      YOB: 1995     Medical Record Number: [id number]  Diagnosis: Chronic pain syndrome  Visit Info Length of Visit: 45 min  Arrived: On Time  Therapeutic Procedures/Exercises: 10 min; Therapeutic Activities: NA; Neuromuscular Re-education: NA  Self Care / Home Management Trainin min ;    Exercise/Activity Education Instruction:  Self Care  -Reviewed mini breaks as sx self regulation - led pt through and practiced taking body inventory and had her come up with brief interventions in session.  Use the interventions that most feel like she is \"doing something\" (vs being lazy) - stretches on her yoga mat, standing stretches, taking a short walk / change position.  - pain flare / anxiety management : had pt write out a list of body centered strategies to use when she experiences increased chest tightness and anxiety - which leads to increased panic and then she can't think of anything to do to help herself.  (Pt's list included: chest stretches, go outside and/or away from people or stressful event and focus on relaxation breathing, meditation, go for a walk);   Activity:  Stretching:    Reviewed stretches for LB tightness as well as chest opening stretches       Notes: Patient reports: some increased stress: apt she thought she and her boyfriend could move into is no longer available to them - living at her boyfriend's parents which is stressful.    Asked how her pain is limiting her currently: states only thing she can think of is her workouts.    Working more hours - 13H/pp - but sometimes does more than that (just not paid).   Experiencing many more panic attacks.  Pain ranges: at best 6-8/10, at worst 9/10 currently 6-7/10  Activity tolerance: getting most things she needs to done - but does so by pushing herself and not really paying attention to her pain.    HEP/Self Care/Home Management Training:   Pacing/Mini Breaks/Self Care: pushes herself past pain limits " "- but states when pain gets really bad she is now stopping to take a break - whereas before she would just ignore and keep going until she was overwhelmed by her pain ; she states that \"taking breaks feels lazy\" (see comments below).  Relaxation Practice: using the relaxation CD each night to help her relax before going to sleep ; does try and use her relaxation breathing now and again when she feels anxious or panicky but states it is hard to use when her panic/anxiousness elevates and then she feels helpless, overwhelmed and doesn't know what to do to help the situation which leads to more panicky feelings.  Posture Corrections: not addressed today.  ;   Walking program: none - has a series of aerobic videos that she watches and tries to do each day - indicates it is currently hard to do so d/t her living situation as well as her pain levels ;   Heat/Ice/TENS: nothing consistent  ;   HEP doing chest stretches off and on during the day; some stretches in her video exercise routines.     So far she reports the following to be most effective and what she is using most consistently:  -chest stretches  -taking breaks when pain is \"really bad\"  -meditation every night    Says she feels lazy when she knows she should take a break and therefore she pushes herself harder than is probably beneficial for her body.  She understands that overdoing isn't helpful - but states it is hard to breaks this habit.    Pt's emotional and mental health as well as psychosocial stressors continue to be significant contributing factors to her pain experience.  Continue to encourage consistent MH support.     Demonstrates/Verbalizes Technique: 2, 3 (1= poor technique-difficulty performing exercises,significant cues required; 5= good technique-performs exercises without cues)  Body Awareness: 2, 3 (1=low awareness; 5=high awareness)  Posture/Stability: 3 (1= poor posture, stability; 5= good posture, stability)   Motivational Level: "   Cooperative Participation:  Full   Patient Satisfaction:  satisfied   Response to Teaching:   demonstrated ability and verbalized, recall/understanding  Factors that affect learning: None   Plan of Care  Cont PT to support reactivation and integration of self regulation pain management skills. (next visit consider:  Review yoga asanas, how mini breaks are going) Cont PT at decreased intervals 1x/3-4 wks.   Therapist: Emily Rollins PT                 Date: 10/10/2017

## 2017-10-11 ENCOUNTER — OFFICE VISIT (OUTPATIENT)
Dept: GASTROENTEROLOGY | Facility: CLINIC | Age: 22
End: 2017-10-11

## 2017-10-11 VITALS
OXYGEN SATURATION: 98 % | SYSTOLIC BLOOD PRESSURE: 111 MMHG | DIASTOLIC BLOOD PRESSURE: 75 MMHG | BODY MASS INDEX: 25.87 KG/M2 | WEIGHT: 146 LBS | HEART RATE: 103 BPM | HEIGHT: 63 IN

## 2017-10-11 DIAGNOSIS — E55.9 VITAMIN D DEFICIENCY: ICD-10-CM

## 2017-10-11 DIAGNOSIS — G47.01 INSOMNIA DUE TO MEDICAL CONDITION: ICD-10-CM

## 2017-10-11 DIAGNOSIS — R10.9 CHRONIC ABDOMINAL PAIN: ICD-10-CM

## 2017-10-11 DIAGNOSIS — G89.29 CHRONIC ABDOMINAL PAIN: ICD-10-CM

## 2017-10-11 DIAGNOSIS — K59.00 CONSTIPATION, UNSPECIFIED CONSTIPATION TYPE: ICD-10-CM

## 2017-10-11 DIAGNOSIS — R10.84 ABDOMINAL PAIN, GENERALIZED: Primary | ICD-10-CM

## 2017-10-11 RX ORDER — LACTULOSE 20 G/30ML
30 SOLUTION ORAL 3 TIMES DAILY PRN
Qty: 300 ML | Refills: 3 | Status: SHIPPED | OUTPATIENT
Start: 2017-10-11 | End: 2018-12-05

## 2017-10-11 RX ORDER — AMITRIPTYLINE HYDROCHLORIDE 50 MG/1
50 TABLET ORAL AT BEDTIME
Qty: 30 TABLET | Refills: 11 | Status: SHIPPED | OUTPATIENT
Start: 2017-10-11 | End: 2019-01-07

## 2017-10-11 RX ORDER — LORAZEPAM 1 MG/1
0.5 TABLET ORAL 2 TIMES DAILY PRN
Qty: 60 TABLET | Refills: 0 | Status: SHIPPED | OUTPATIENT
Start: 2017-10-11 | End: 2018-06-01

## 2017-10-11 ASSESSMENT — PAIN SCALES - GENERAL: PAINLEVEL: SEVERE PAIN (6)

## 2017-10-11 NOTE — NURSING NOTE
"Chief Complaint   Patient presents with     RECHECK     abdominal pain       Vitals:    10/11/17 1309   BP: 111/75   Pulse: 103   SpO2: 98%   Weight: 66.2 kg (146 lb)   Height: 1.6 m (5' 3\")       Body mass index is 25.86 kg/(m^2).                          "

## 2017-10-11 NOTE — LETTER
"10/11/2017       RE: Samara Oropeza  [address]  [facility] [address]     Dear Colleague,    Thank you for referring your patient, Samara Oropeza, to the [facility] at [facility]. Please see a copy of my visit note below.    CHIEF COMPLAINT:  Followup regarding abdominal pain.      HISTORY OF PRESENT ILLNESS:  Samara is a 22-year-old woman seen here in Gastroenterology over 3 years who returns to see me.  She has had an increase in her abdominal pain over the last 2-3 weeks.  However, she has also had a bowel movement only once in the 2 weeks.  She has lactulose but is now living with her \"in-laws\" with a single bathroom and does not feel she is at liberty to use the bathroom.  She has not used the lactulose.  She has vomited 3 times in the last week, mucusy substance generally clear and occasionally with food.      Samara understands to have protein in the diet and had eggs and mushrooms for breakfast, for instance.  She is not eating regularly but rather a meal and perhaps some snacks during the day.  She continues to experience acid reflux and needs refills, including the omeprazole.  She continues on linaclotide 290 mcg, using it about every other day to every third day usually.      MEDICAL HISTORY:  Reviewed.  Note that she has had studies twice over the years documenting no Crohn's disease or intestinal involvement of her Behcet's.  She feels the clobetasol and colchicine now with Otezla control it quite well and she has not returned to the intensity of discomfort with her lesions, which continue both orally and at the labia.      SURGICAL HISTORY:  Nothing new.      FAMILY HISTORY:  Negative for GI disease or autoimmune disease other than her own.      SOCIAL HISTORY:  Never a tobacco user with rare low dose alcohol, no street drugs.  She has a partner now for 5 years who she is living with and will soon have an apartment " with.      REVIEW OF SYSTEMS:  She acknowledges multiple symptoms of depression and of anxiety.  She denies symptoms of acute illness or localized infection.  She attributes her current worsening to the current social situation.      MEDICATIONS:  She has nausea from the Otezla and has tried using it once daily and tried to return to twice daily dosing.      OBJECTIVE:   VITAL SIGNS:  Reviewed, stable.  Note pain 6/10 in the abdomen.  BMI 25.8.   GENERAL:  Clean, pleasant, neatly groomed, young woman with mildly flat affect who can perk up and have normal interactions.  She is here with her mother, Jinny.   HEENT:  Eyes are clear.   RESPIRATIONS:  Breathing is calm and grossly normal.   ABDOMEN:  Mildly diffusely tender, bloated.  There is no rigidity, guarding, rebound.   EXTREMITIES:  Without edema.      RESULTS:  Discussed at the last visit: normal ambulatory reflux screening procedure with [place]ter 9.2, only mildly acidic reflux, likely post-prandial.  Last upper GI endoscopy done in 06/2014 at [facility] with normal biopsies.   Continues to see Dr. Marquez at his new clinic, last seen 08/22/2017.   One episode of severe alcohol use with alcohol poisoning in June.    Abdominal x-ray in June with moderate stool throughout, CBC normal, CMP normal.      ASSESSMENT:  A woman with irritable bowel syndrome, constipation-predominant, now at acute phase with inadequate treatment of her constipation and inadequate ingestion.  Notes that she has had decrease in nausea with sucralfate but that it increases nonetheless with Otezla without successful treatment from sucralfate.  She has a number of rheumatologic symptoms, including the Behcet's diagnosed 2014, also chronic polyarthralgia, folliculitis, and recent finding of neurologic symptoms, which may be complicated migraine or something else -- epileptic episodes.  The abdominal symptoms are chronic and waxing and waning, partially related to her  adherence to plan.      PLAN:   1.  Follow GERD precautions, use omeprazole daily.   2.  Return to treatment of her constipation with addition of the lactulose, may be low dose and increasing, should likely be repeated several days in a row and return use of linaclotide as well.   3.  Okay to continue sucralfate, best on an empty stomach an hour or more before the next meal.   4.  Call as needed.   5.  Return to GI Clinic in 3-4 months.      We reviewed her symptoms, worsening of symptoms with her current social circumstances, past studies and no need to repeat them at this time.      It was my pleasure to meet and see Samara again as well as to see her mother.      Total visit 25 minutes with counseling time 15 minutes.         EVI DE LA FUENTE CNP             D: 10/12/2017 17:45   T: 10/13/2017 08:48   MT: NELY      Name:     SAMARA BAH   MRN:      [id number]        Account:      [id number]   :      1995           Service Date: 10/11/2017      Document: [id number]

## 2017-10-11 NOTE — PATIENT INSTRUCTIONS
"Bananas are better for bedtime dose or otezla than apple - for stomach comfort    USE also for the atypical chest pain.    Diaphragmatic breathing uses the large muscle between the chest and abdomen.        (rather than only the small muscles between the ribs)  This breathing calms the gut, decreases pain, prevents pain from being overwhelming.  This breathing is documented to calm the brain centers that create anxiety.  Learn this with one hand on the belly button and one on the chest.  Prop your head so that you can relax and see your hands.  Blow out tightening the tummy, blow with a gentle steady breath.  When you inhale, be sure that your tummy is expanding, not the chest.  Gradually slow this down. Breathe in and out evenly, think \"1-2, 1-2-\" then \"1-2-3-, 1-2-3-\".  Count ONLY to learn. Later, do not count. This keeps you \"in your head\". Focus on breathing, how it feels    To fall asleep, start the same way.  Relax all muscles, especially the neck.   Send thoughts to the universe to let go of something you cannot control,         or to the bed-side table to remember for tomorrow.  Then, let the diaphragmatic breath fall out. Take the next breath only when the body needs it.      Please yes, drink liquid. Best is water.  Restart lactulose, as needed   Continue the linaclotide (Linzess).  Every other day     Learn and do Kegel exercises:    slowly tighten muscles inside the bottom as if to hold urine or gas,    then relax those muscles.  Do this exercise a few times, a few times each day.  AND when you sit on the toilet,   do a Kegel and then relax those muscles to have a BM.    Try the Kegel's.  If you still have no sensation, call or message and referral will be made to the [facility].     Continue the omeprazole (Prilosec).  Perhaps later you can decrease the dose.    New GI MD   Estela Blanca MD>    Return to GI Clinic 4-6 months.    [facility]   For appointments call " Malini, [phone]  Coordinator  [phone] Ashley or call -   Nurse Triage  [phone] for Medical Questions, # 3  Fax results to

## 2017-10-11 NOTE — MR AVS SNAPSHOT
"              After Visit Summary   10/11/2017    Samara Oropeza    MRN: [id number]           Patient Information     Date Of Birth          1995        Visit Information        Provider Department      10/11/2017 1:00 PM Kacie Almeida, APRN CNP [facility]        Today's Diagnoses     Abdominal pain, generalized    -  1    Vitamin D deficiency        Insomnia due to medical condition        Constipation, unspecified constipation type        Chronic abdominal pain          Care Instructions    Bananas are better for bedtime dose or otezla than apple - for stomach comfort    USE also for the atypical chest pain.    Diaphragmatic breathing uses the large muscle between the chest and abdomen.        (rather than only the small muscles between the ribs)  This breathing calms the gut, decreases pain, prevents pain from being overwhelming.  This breathing is documented to calm the brain centers that create anxiety.  Learn this with one hand on the belly button and one on the chest.  Prop your head so that you can relax and see your hands.  Blow out tightening the tummy, blow with a gentle steady breath.  When you inhale, be sure that your tummy is expanding, not the chest.  Gradually slow this down. Breathe in and out evenly, think \"1-2, 1-2-\" then \"1-2-3-, 1-2-3-\".  Count ONLY to learn. Later, do not count. This keeps you \"in your head\". Focus on breathing, how it feels    To fall asleep, start the same way.  Relax all muscles, especially the neck.   Send thoughts to the universe to let go of something you cannot control,         or to the bed-side table to remember for tomorrow.  Then, let the diaphragmatic breath fall out. Take the next breath only when the body needs it.      Please yes, drink liquid. Best is water.  Restart lactulose, as needed   Continue the linaclotide (Linzess).  Every other day     Learn and do Kegel exercises:    slowly tighten muscles inside the bottom " as if to hold urine or gas,    then relax those muscles.  Do this exercise a few times, a few times each day.  AND when you sit on the toilet,   do a Kegel and then relax those muscles to have a BM.    Try the Kegel's.  If you still have no sensation, call or message and referral will be made to the [facility].     Continue the omeprazole (Prilosec).  Perhaps later you can decrease the dose.    New GI MD   Estela Blanca MD>    Return to GI Clinic 4-6 months.    [facility]   For appointments call Malini, [phone]  Coordinator  [phone] Ashley or call -  GI Nurse Triage  [phone] for Medical Questions, # 3  Fax results to               Follow-ups after your visit        Follow-up notes from your care team     Return in about 4 months (around 2/11/2018).      Your next 10 appointments already scheduled     Oct 12, 2017  2:30 PM CDT   Return Visit with Kimo Hudson LP   [facility]    [address]  [facility] [id number] [phone]            Oct 12, 2017  3:30 PM CDT   Return Visit with EVI Dahl CNP   [facility] ([facility])    [address]  [facility] [id number] [phone]            Oct 18, 2017 11:30 AM CDT   Return Visit with Austen Marquez MD   [facility] ([facility])    [address] [place] [id number] [phone]            Oct 25, 2017  7:00 AM CDT   (Arrive by 6:45 AM)   MR BRAIN W/O CONTRAST with [id number]   [facility] ([facility])    [address] [id number] [phone]           Take your medicines as usual, unless your doctor tells you not to. Bring a list of your current medicines to your exam (including vitamins, minerals and over-the-counter drugs).  Also bring the results of similar scans you may have had.  Please remove any body piercings and hair extensions before you arrive.  Follow your doctor s orders. If you do not, we may have to postpone your exam.  You will not have contrast for this exam. You do not need to do anything special to prepare.  The MRI machine uses a strong magnet. Please wear clothes without metal (snaps, zippers). A sweatsuit works well, or we may give you a hospital gown.   **IMPORTANT** THE INSTRUCTIONS BELOW ARE ONLY FOR THOSE PATIENTS WHO HAVE BEEN TOLD THEY WILL RECEIVE SEDATION OR GENERAL ANESTHESIA DURING THEIR MRI PROCEDURE:  IF YOU WILL RECEIVE SEDATION (take medicine to help you relax during your exam):   You must get the medicine from your doctor before you arrive. Bring the medicine to the exam. Do not take it at home.   Arrive one hour early. Bring someone who can take you home after the test. Your medicine will make you sleepy. After the exam, you may not drive, take a bus or take a taxi by yourself.   No eating 8 hours before your exam. You may have clear liquids up until 4 hours before your exam. (Clear liquids include water, clear tea, black coffee and fruit juice without pulp.)  IF YOU WILL RECEIVE ANESTHESIA (be asleep for your exam):   Arrive 1 1/2 hours early. Bring someone who can take you home after the test. You may not drive, take a bus or take a taxi by yourself.   No eating 8 hours before your exam. You may have clear liquids up until 4 hours before your exam. (Clear liquids include water, clear tea, black coffee and fruit juice without pulp.)   You will spend four to five hours in the recovery room.  Please call the Imaging Department at your exam site with any questions.            Oct 25, 2017  8:30 AM CDT   (Arrive by 8:15 AM)   In Lab Video Visit with Open CS EEG TECH 2   [facility] ([facility])    [address] [address] [phone]             Oct 31, 2017  9:15 AM CDT   Return Visit with Emily Rollins, TAINA   [facility] ([facility])    [address]  [facility] [id number] [phone]            Dec 06, 2017  3:00 PM CST   (Arrive by 2:45 PM)   Return Botox with Frederick Bender MD   [facility] ([facility])    [address]  3rd Floor  [facility] [id number] [phone]            Dec 12, 2017  2:45 PM CST   [facility] with Cata Hurst NP   [facility]    [address]  [address]  [facility] [address] [phone]            Jan 17, 2018  2:20 PM CST   (Arrive by 2:05 PM)   New Patient Visit with Estela Cuenca MD   [facility] ([facility])    [address]  [address] [facility] [phone] [phone]            Jan 24, 2018  2:00 PM CST   (Arrive by 1:45 PM)   Return Visit with EVI Vizcaino CNP   [facility] ([facility])    [address]  [address] [facility] [id number] [phone]              Who to contact     Please call your clinic at [phone] to:    Ask questions about your health    Make or cancel appointments    Discuss your medicines    Learn about your test results    Speak to your doctor   If you have compliments or concerns about an experience at your clinic, or if you wish to file a complaint, please contact [facility] Patient Relations at [phone] or email us at [email or web address]         Additional Information About Your Visit        Ibercheckhart Information     Tablus is an electronic gateway that provides easy, online access to your medical records. With Tablus, you can request a clinic appointment, read your test results, renew a prescription or  "communicate with your care team.     To sign up for Vidmindhart visit the website at [email or web address]   You will be asked to enter the access code listed below, as well as some personal information. Please follow the directions to create your username and password.     Your access code is: [email or web address]  Expires: 10/29/2017 10:26 AM     Your access code will  in 90 days. If you need help or a new code, please contact your [facility] or call [phone] for assistance.        Care EveryWhere ID     This is your Care EveryWhere ID. This could be used by other organizations to access your [place] medical records  [id number]        Your Vitals Were     Pulse Height Last Period Pulse Oximetry BMI (Body Mass Index)       103 5' 3\" 2017 (Exact Date) 98% 25.86 kg/m2        Blood Pressure from Last 3 Encounters:   10/11/17 111/75   10/09/17 108/56   10/03/17 110/75    Weight from Last 3 Encounters:   10/11/17 146 lb   10/09/17 145 lb   10/03/17 143 lb 12.8 oz              Today, you had the following     No orders found for display         Today's Medication Changes          These changes are accurate as of: 10/11/17  2:31 PM.  If you have any questions, ask your nurse or doctor.               These medicines have changed or have updated prescriptions.        Dose/Directions    * amitriptyline 25 MG tablet   Commonly known as:  ELAVIL   This may have changed:  how much to take   Used for:  Atypical chest pain, Insomnia, unspecified type   Changed by:  Kacie Almeida APRN CNP        Dose:  25 mg   Take 1 tablet (25 mg) by mouth At Bedtime   Quantity:  45 tablet   Refills:  5       * amitriptyline 50 MG tablet   Commonly known as:  ELAVIL   This may have changed:  You were already taking a medication with the same name, and this prescription was added. Make sure you understand how and when to take each.   Used for:  Abdominal pain, generalized, Insomnia due to medical " condition   Changed by:  Kacie Almeida APRN CNP        Dose:  50 mg   Take 1 tablet (50 mg) by mouth At Bedtime   Quantity:  30 tablet   Refills:  11       LORazepam 1 MG tablet   Commonly known as:  ATIVAN   This may have changed:  reasons to take this   Used for:  Chronic abdominal pain   Changed by:  Kacie Almeida APRN CNP        Dose:  0.5 mg   Take 0.5 tablets (0.5 mg) by mouth 2 times daily as needed for other (atypical chest pain) Do not operate a vehicle after taking this medication   Quantity:  60 tablet   Refills:  0       * Notice:  This list has 2 medication(s) that are the same as other medications prescribed for you. Read the directions carefully, and ask your doctor or other care provider to review them with you.         Where to get your medicines      These medications were sent to [facility] - [address] [address]     Phone:  [phone]     amitriptyline 50 MG tablet    lactulose 20 GM/30ML Soln         Some of these will need a paper prescription and others can be bought over the counter.  Ask your nurse if you have questions.     Bring a paper prescription for each of these medications     LORazepam 1 MG tablet                Primary Care Provider Office Phone # Fax #    Sonja EVI Laguerre -[phone]       [address]  [facility] [place] [address]        Equal Access to Services     NABIL GALEANO AH: Hadcralin ahumada Sotiffany, waaxda luqadaha, qaybta kaalmada adedavidyada, mike parsons. So [facility] [phone].    ATENCIÓN: Si habla español, tiene a livingston disposición servicios gratuitos de asistencia lingüística. Jesus bullard [phone].    We comply with applicable federal civil rights laws and [place] laws. We do not discriminate on the basis of race, color, national origin, age, disability, sex, sexual orientation, or gender identity.             Thank you!     Thank you for choosing [facility]  for your care. Our goal is always to provide you with excellent care. Hearing back from our patients is one way we can continue to improve our services. Please take a few minutes to complete the written survey that you may receive in the mail after your visit with us. Thank you!             Your Updated Medication List - Protect others around you: Learn how to safely use, store and throw away your medicines at www.disposemymeds.org.          This list is accurate as of: 10/11/17  2:31 PM.  Always use your most recent med list.                   Brand Name Dispense Instructions for use Diagnosis    albuterol 108 (90 BASE) MCG/ACT Inhaler    PROAIR HFA/PROVENTIL HFA/VENTOLIN HFA    1 Inhaler    Inhale 2 puffs into the lungs every 6 hours as needed for shortness of breath / dyspnea or wheezing    Atypical chest pain       * amitriptyline 25 MG tablet    ELAVIL    45 tablet    Take 1 tablet (25 mg) by mouth At Bedtime    Atypical chest pain, Insomnia, unspecified type       * amitriptyline 50 MG tablet    ELAVIL    30 tablet    Take 1 tablet (50 mg) by mouth At Bedtime    Abdominal pain, generalized, Insomnia due to medical condition       apremilast 30 MG tablet    OTEZLA    60 tablet    20 mg in AM and 30mg in PM daily X 2 weeks and then 30mg twice daily.    Behcet's disease (H)       ASPIRIN CHILDRENS 81 MG chewable tablet   Generic drug:  aspirin      Take 81 mg by mouth as needed        betamethasone valerate 0.1 % cream    VALISONE     Apply topically 2 times daily        * botulinum toxin type A 100 UNITS injection    BOTOX    200 Units    Inject 200 Units into the muscle every 3 months    Cervical dystonia       * ONABOTULINUMTOXINA IJ      Inject 165 Units as directed once Lot# /[id number] Expiration: 04/2020        carbamide peroxide 6.5 % otic solution    DEBROX     5 drops 2 times daily        clobetasol 0.05 % cream     TEMOVATE    60 g    Apply topically 2 times daily    Folliculitis       Colchicine 0.6 MG Caps     90 capsule    Take 0.6 mg by mouth See Admin Instructions 2 capsules in AM and 1 capsule in PM    Behcet's syndrome (H)       diclofenac 1 % Gel topical gel    VOLTAREN    100 g    Apply 2-4 grams to affected area(s) up to 4 times per day as needed. This is an anti-inflammatory medication.    Polyarthralgia       dicyclomine 20 MG tablet    BENTYL    60 tablet    Take 1 tablet (20 mg) by mouth 4 times daily as needed    Abdominal cramping       diphenhydrAMINE 25 MG tablet    BENADRYL    30 tablet    Take 1 tablet (25 mg) by mouth every 8 hours as needed for allergies        EPINEPHrine 0.3 MG/0.3ML injection 2-pack    EPIPEN 2-EAMON    0.6 mL    Inject 0.3 mLs (0.3 mg) into the muscle as needed for anaphylaxis    Hx of bee sting allergy       EXCEDRIN MIGRAINE PO      Take by mouth as needed        guanFACINE 1 MG tablet    TENEX    180 tablet    Take 3 tablets (3 mg) by mouth twice daily morning and evening.    Tic       hydrOXYzine 25 MG tablet    ATARAX    60 tablet    Take 1-2 tablets (25-50 mg) by mouth every 6 hours as needed for anxiety    TAHIR (generalized anxiety disorder)       hyoscyamine 0.125 MG tablet    ANASPAZ/LEVSIN    40 tablet    Take 1-2 tablets (125-250 mcg) by mouth every 4 hours as needed for cramping    Abdominal pain, generalized       hypromellose 0.3 % Soln ophthalmic solution    GENTEAL     1 drop every hour as needed        LACTAID PO      Take by mouth daily        lactulose 20 GM/30ML Soln     300 mL    Take 30 mLs by mouth 3 times daily as needed    Constipation, unspecified constipation type       lidocaine 2 % topical gel    XYLOCAINE     Apply 1 Tube topically daily Reported on 4/21/2017        lidocaine visc 2% 2.5mL/5mL & maalox/mylanta w/ simeth 2.5mL/5mL & diphenhydrAMINE 5mg/5mL Susp suspension    [facility]    1 Bottle    Swish and swallow 10 mLs in mouth every 6  hours as needed for mouth sores    Behcet's syndrome (H)       linaclotide 290 MCG capsule    LINZESS    90 capsule    Take 1 capsule (290 mcg) by mouth every morning (before breakfast)    Irritable bowel syndrome       LORazepam 1 MG tablet    ATIVAN    60 tablet    Take 0.5 tablets (0.5 mg) by mouth 2 times daily as needed for other (atypical chest pain) Do not operate a vehicle after taking this medication    Chronic abdominal pain       meclizine 25 MG tablet    ANTIVERT    30 tablet    Take 1 tablet (25 mg) by mouth every 6 hours as needed for dizziness    Nausea       norgestimate-ethinyl estradiol 0.25-35 MG-MCG per tablet    ORTHO-CYCLEN, SPRINTEC    84 tablet    Take 1 tablet by mouth daily    General counseling for prescription of oral contraceptives       omeprazole 40 MG capsule    priLOSEC    90 capsule    Take 1 capsule (40 mg) by mouth daily    Gastroesophageal reflux disease, esophagitis presence not specified       ondansetron 8 MG ODT tab    ZOFRAN-ODT    60 tablet    Take 1 tablet (8 mg) by mouth every 8 hours as needed for nausea    Non-intractable vomiting with nausea, unspecified vomiting type, Hematemesis, presence of nausea not specified       order for DME     1 Device    Post op shoe    Contusion of right foot, initial encounter       promethazine 25 MG tablet    PHENERGAN    20 tablet    Take 0.5-1 tablets (12.5-25 mg) by mouth every 6 hours as needed for nausea    Intractable chronic migraine without aura and without status migrainosus       sucralfate 1 GM/10ML suspension    CARAFATE    1200 mL    Take 10 mLs (1 g) by mouth 4 times daily    Bile reflux gastritis, Nausea       triamcinolone 0.1 % cream    KENALOG    15 g    Apply sparingly to oral ulcers three times daily for 14 days as needed.    Behcet's syndrome (H)       * Notice:  This list has 4 medication(s) that are the same as other medications prescribed for you. Read the directions carefully, and ask your doctor or other care  provider to review them with you.

## 2017-10-13 ENCOUNTER — OFFICE VISIT (OUTPATIENT)
Dept: MIDWIFE SERVICES | Facility: CLINIC | Age: 22
End: 2017-10-13
Payer: COMMERCIAL

## 2017-10-13 VITALS
HEIGHT: 63 IN | TEMPERATURE: 98.9 F | OXYGEN SATURATION: 99 % | BODY MASS INDEX: 25.69 KG/M2 | SYSTOLIC BLOOD PRESSURE: 132 MMHG | WEIGHT: 145 LBS | HEART RATE: 115 BPM | DIASTOLIC BLOOD PRESSURE: 88 MMHG

## 2017-10-13 DIAGNOSIS — N63.0 LUMP OR MASS IN BREAST: Primary | ICD-10-CM

## 2017-10-13 PROCEDURE — 99203 OFFICE O/P NEW LOW 30 MIN: CPT | Performed by: ADVANCED PRACTICE MIDWIFE

## 2017-10-13 NOTE — MR AVS SNAPSHOT
After Visit Summary   10/13/2017    Samara Oropeza    MRN: [id number]           Patient Information     Date Of Birth          1995        Visit Information        Provider Department      10/13/2017 4:00 PM Sharmila Bowman APRN CNM [facility]        Today's Diagnoses     Lump or mass in breast    -  1       Follow-ups after your visit        Your next 10 appointments already scheduled     Oct 17, 2017  9:00 AM CDT   Return Visit with EVI Dahl CNP   [facility] ([facility])    [address]  [facility] [address] [phone]            Oct 18, 2017 11:30 AM CDT   Return Visit with Austen Marquez MD   [facility] ([facility])    [address] [phone]            Oct 19, 2017  2:30 PM CDT   Return Visit with Kimo Hudson LP   [facility] ([facility])    [address]  [facility] [address] [phone]            Oct 25, 2017  7:00 AM CDT   (Arrive by 6:45 AM)   MR BRAIN W/O CONTRAST with [id number]   [facility] ([facility])    [address] [id number] [phone]           Take your medicines as usual, unless your doctor tells you not to. Bring a list of your current medicines to your exam (including vitamins, minerals and over-the-counter drugs). Also bring the results of similar scans you may have had.  Please remove any body piercings and hair extensions before you arrive.  Follow your doctor s orders. If you do not, we may have to postpone your exam.  You will not have contrast for this exam. You do not need to do anything special to prepare.  The MRI machine uses a strong magnet. Please wear clothes without metal (snaps, zippers). A sweatsuit works well, or we may give you a  [facility].   **IMPORTANT** THE INSTRUCTIONS BELOW ARE ONLY FOR THOSE PATIENTS WHO HAVE BEEN TOLD THEY WILL RECEIVE SEDATION OR GENERAL ANESTHESIA DURING THEIR MRI PROCEDURE:  IF YOU WILL RECEIVE SEDATION (take medicine to help you relax during your exam):   You must get the medicine from your doctor before you arrive. Bring the medicine to the exam. Do not take it at home.   Arrive one hour early. Bring someone who can take you home after the test. Your medicine will make you sleepy. After the exam, you may not drive, take a bus or take a taxi by yourself.   No eating 8 hours before your exam. You may have clear liquids up until 4 hours before your exam. (Clear liquids include water, clear tea, black coffee and fruit juice without pulp.)  IF YOU WILL RECEIVE ANESTHESIA (be asleep for your exam):   Arrive 1 1/2 hours early. Bring someone who can take you home after the test. You may not drive, take a bus or take a taxi by yourself.   No eating 8 hours before your exam. You may have clear liquids up until 4 hours before your exam. (Clear liquids include water, clear tea, black coffee and fruit juice without pulp.)   You will spend four to five hours in the recovery room.  Please call the Imaging Department at your exam site with any questions.            Oct 25, 2017  8:30 AM CDT   (Arrive by 8:15 AM)   In Lab Video Visit with  EEG TECH 2   [facility] ([facility])    [address] [place] [address]   [phone]            Oct 31, 2017  9:15 AM CDT   Return Visit with Emily Rollins PT   [facility] ([facility])    [address] [place] [address]   [phone]            Dec 06, 2017  3:00 PM CST   (Arrive by 2:45 PM)   Return Botox with Frederick Bender MD   [facility] ([facility])    [address] [place] [address]  [id number] [phone]            Dec 12, 2017  2:45 PM CST   Return Visit with Cata Hurst NP   [facility] ([facility])    [address]  [address]  [facility] [address] [phone]            Jan 17, 2018  2:20 PM CST   (Arrive by 2:05 PM)   New Patient Visit with Estela Cuenca MD   [facility] ([facility])    [address] [facility]  [address] [facility] [id number] [phone]            Jan 24, 2018  2:00 PM CST   (Arrive by 1:45 PM)   Return Visit with EVI Vizcaino CNP   [facility] ([facility])    [address] [place] [address]  [address] [facility] [phone] [phone]              Future tests that were ordered for you today     Open Future Orders        Priority Expected Expires Ordered    US Breast Right Limited 1-3 Quadrants Routine  10/13/2018 10/13/2017            Who to contact     If you have questions or need follow up information about today's clinic visit or your schedule please contact [facility] directly at [phone].  Normal or non-critical lab and imaging results will be communicated to you by Boxbehart, letter or phone within 4 business days after the clinic has received the results. If you do not hear from us within 7 days, please contact the clinic through Boxbehart or phone. If you have a critical or abnormal lab result, we will notify you by phone as soon as possible.  Submit refill requests through Moburst or call your pharmacy and they will forward the refill request to us. Please allow 3 business days for your refill to be completed.          Additional Information About Your Visit        Moburst Information     Moburst gives you secure access to your electronic health record. If you see a primary care provider, you can also send messages to your care team and make appointments. If you  "have questions, please call your primary care clinic.  If you do not have a primary care provider, please call [phone] and they will assist you.        Care EveryWhere ID     This is your Care EveryWhere ID. This could be used by other organizations to access your [place] medical records  [id number]        Your Vitals Were     Pulse Temperature Height Last Period Pulse Oximetry Breastfeeding?    115 98.9  F (37.2  C) (Oral) 5' 3\" (1.6 m) 09/28/2017 (Exact Date) 99% No    BMI (Body Mass Index)                   25.69 kg/m2            Blood Pressure from Last 3 Encounters:   10/13/17 132/88   10/11/17 111/75   10/09/17 108/56    Weight from Last 3 Encounters:   10/13/17 145 lb (65.8 kg)   10/11/17 146 lb (66.2 kg)   10/09/17 145 lb (65.8 kg)                 Today's Medication Changes          These changes are accurate as of: 10/13/17  4:44 PM.  If you have any questions, ask your nurse or doctor.               Stop taking these medicines if you haven't already. Please contact your care team if you have questions.     order for DME   Stopped by:  Sharmila Bowman APRN CNM                    Primary Care Provider Office Phone # Fax #    Sonja EVI Laguerre [facility] [phone]       [address] [facility] [address]  [facility] [address]        Equal Access to Services     NABIL GALEANO : Hadii aad ku hadasho Soteddyali, waaxda luqadaha, qaybta kaalmada adeegyada, mike rodriguez . So [facility] [phone].    ATENCIÓN: Si habla español, tiene a livingston disposición servicios gratuitos de asistencia lingüística. Llame al [phone].    We comply with applicable federal civil rights laws and [place] laws. We do not discriminate on the basis of race, color, national origin, age, disability, sex, sexual orientation, or gender identity.            Thank you!     Thank you for choosing [facility]  for your care. Our goal is always to provide you with excellent care. Hearing " back from our patients is one way we can continue to improve our services. Please take a few minutes to complete the written survey that you may receive in the mail after your visit with us. Thank you!             Your Updated Medication List - Protect others around you: Learn how to safely use, store and throw away your medicines at www.disposemymeds.org.          This list is accurate as of: 10/13/17  4:44 PM.  Always use your most recent med list.                   Brand Name Dispense Instructions for use Diagnosis    albuterol 108 (90 BASE) MCG/ACT Inhaler    PROAIR HFA/PROVENTIL HFA/VENTOLIN HFA    1 Inhaler    Inhale 2 puffs into the lungs every 6 hours as needed for shortness of breath / dyspnea or wheezing    Atypical chest pain       amitriptyline 50 MG tablet    ELAVIL    30 tablet    Take 1 tablet (50 mg) by mouth At Bedtime    Abdominal pain, generalized, Insomnia due to medical condition       apremilast 30 MG tablet    OTEZLA    60 tablet    20 mg in AM and 30mg in PM daily X 2 weeks and then 30mg twice daily.    Behcet's disease (H)       ASPIRIN CHILDRENS 81 MG chewable tablet   Generic drug:  aspirin      Take 81 mg by mouth as needed        betamethasone valerate 0.1 % cream    VALISONE     Apply topically 2 times daily        * botulinum toxin type A 100 UNITS injection    BOTOX    200 Units    Inject 200 Units into the muscle every 3 months    Cervical dystonia       * ONABOTULINUMTOXINA IJ      Inject 165 Units as directed once Lot# /[id number] Expiration: 04/2020        carbamide peroxide 6.5 % otic solution    DEBROX     5 drops 2 times daily        clobetasol 0.05 % cream    TEMOVATE    60 g    Apply topically 2 times daily    Folliculitis       Colchicine 0.6 MG Caps     90 capsule    Take 0.6 mg by mouth See Admin Instructions 2 capsules in AM and 1 capsule in PM    Behcet's syndrome (H)       diclofenac 1 % Gel topical gel    VOLTAREN    100 g    Apply 2-4 grams to affected area(s) up to 4  times per day as needed. This is an anti-inflammatory medication.    Polyarthralgia       dicyclomine 20 MG tablet    BENTYL    60 tablet    Take 1 tablet (20 mg) by mouth 4 times daily as needed    Abdominal cramping       diphenhydrAMINE 25 MG tablet    BENADRYL    30 tablet    Take 1 tablet (25 mg) by mouth every 8 hours as needed for allergies        EPINEPHrine 0.3 MG/0.3ML injection 2-pack    EPIPEN 2-EAMON    0.6 mL    Inject 0.3 mLs (0.3 mg) into the muscle as needed for anaphylaxis    Hx of bee sting allergy       EXCEDRIN MIGRAINE PO      Take by mouth as needed        guanFACINE 1 MG tablet    TENEX    180 tablet    Take 3 tablets (3 mg) by mouth twice daily morning and evening.    Tic       hydrOXYzine 25 MG tablet    ATARAX    60 tablet    Take 1-2 tablets (25-50 mg) by mouth every 6 hours as needed for anxiety    TAHIR (generalized anxiety disorder)       hyoscyamine 0.125 MG tablet    ANASPAZ/LEVSIN    40 tablet    Take 1-2 tablets (125-250 mcg) by mouth every 4 hours as needed for cramping    Abdominal pain, generalized       hypromellose 0.3 % Soln ophthalmic solution    GENTEAL     1 drop every hour as needed        LACTAID PO      Take by mouth daily        lactulose 20 GM/30ML Soln     300 mL    Take 30 mLs by mouth 3 times daily as needed    Constipation, unspecified constipation type       lidocaine 2 % topical gel    XYLOCAINE     Apply 1 Tube topically daily Reported on 4/21/2017        lidocaine visc 2% 2.5mL/5mL & maalox/mylanta w/ simeth 2.5mL/5mL & diphenhydrAMINE 5mg/5mL Susp suspension    [facility]    1 Bottle    Swish and swallow 10 mLs in mouth every 6 hours as needed for mouth sores    Behcet's syndrome (H)       linaclotide 290 MCG capsule    LINZESS    90 capsule    Take 1 capsule (290 mcg) by mouth every morning (before breakfast)    Irritable bowel syndrome       LORazepam 1 MG tablet    ATIVAN    60 tablet    Take 0.5 tablets (0.5 mg) by mouth 2 times daily as needed  for other (atypical chest pain) Do not operate a vehicle after taking this medication    Chronic abdominal pain       meclizine 25 MG tablet    ANTIVERT    30 tablet    Take 1 tablet (25 mg) by mouth every 6 hours as needed for dizziness    Nausea       norgestimate-ethinyl estradiol 0.25-35 MG-MCG per tablet    ORTHO-CYCLEN, SPRINTEC    84 tablet    Take 1 tablet by mouth daily    General counseling for prescription of oral contraceptives       omeprazole 40 MG capsule    priLOSEC    90 capsule    Take 1 capsule (40 mg) by mouth daily    Gastroesophageal reflux disease, esophagitis presence not specified       ondansetron 8 MG ODT tab    ZOFRAN-ODT    60 tablet    Take 1 tablet (8 mg) by mouth every 8 hours as needed for nausea    Non-intractable vomiting with nausea, unspecified vomiting type, Hematemesis, presence of nausea not specified       promethazine 25 MG tablet    PHENERGAN    20 tablet    Take 0.5-1 tablets (12.5-25 mg) by mouth every 6 hours as needed for nausea    Intractable chronic migraine without aura and without status migrainosus       sucralfate 1 GM/10ML suspension    CARAFATE    1200 mL    Take 10 mLs (1 g) by mouth 4 times daily    Bile reflux gastritis, Nausea       triamcinolone 0.1 % cream    KENALOG    15 g    Apply sparingly to oral ulcers three times daily for 14 days as needed.    Behcet's syndrome (H)       * Notice:  This list has 2 medication(s) that are the same as other medications prescribed for you. Read the directions carefully, and ask your doctor or other care provider to review them with you.

## 2017-10-13 NOTE — PROGRESS NOTES
"Chief Complaint   Patient presents with     Breast Problem       Initial /88 (BP Location: Left arm, Patient Position: Sitting, Cuff Size: Adult Regular)  Pulse 115  Temp 98.9  F (37.2  C) (Oral)  Ht 5' 3\" (1.6 m)  Wt 145 lb (65.8 kg)  LMP 2017 (Exact Date)  SpO2 99%  Breastfeeding? No  BMI 25.69 kg/m2 Estimated body mass index is 25.69 kg/(m^2) as calculated from the following:    Height as of this encounter: 5' 3\" (1.6 m).    Weight as of this encounter: 145 lb (65.8 kg).  BP completed using cuff size: regular        The following HM Due: pap smear  Chalmydia (13-24)  Vaccinations: tdap      The following patient reported/Care Every where data was sent to:  P ABSTRACT QUALITY INITIATIVES [14780]  n/a     n/a and patient has appointment for today             "

## 2017-10-13 NOTE — PROGRESS NOTES
"S:  Samara Oropeza is a 22 year old  who presents today for evaluation of lump in her breast. The patient reports she has had a lump in her right breast since February. She is here today because the past 3 days it has become painful. The pain is constant with and without palpation but more painful with palpation. She has no family history of breast cancer. She has a family history in her mother and grandmother having benign masses in her breast that have needed to be drained and removed. She has significant health issues and is followed by a rheumatologist for these issues. Patient has no other questions or concerns about this. Discussed getting an ultrasound to see if the mass needs to be drained or removed.   Patient is due for pap smear. She has never had one done. She has had intercourse. She has a lot of lesion in her genital area. Due to this issue she has not had sex in a long time and does not want to get a pap smear for fear of discomfort. She was encouraged by her rheumatologist to get this done. She does not want to get it done today. She is open to having it scheduled for another day. Recommended she have it scheduled when she here for her breast ultrasound.     O:  /88 (BP Location: Left arm, Patient Position: Sitting, Cuff Size: Adult Regular)  Pulse 115  Temp 98.9  F (37.2  C) (Oral)  Ht 5' 3\" (1.6 m)  Wt 145 lb (65.8 kg)  LMP 2017 (Exact Date)  SpO2 99%  Breastfeeding? No  BMI 25.69 kg/m2  Breast: left breast no masses. Right breast mass that is tender with palpation at 3 o'clock. Deep within the tissue. Irregular edges.     A:  Breast mass with pain     P:  Ultrasound ordered for further evaluation.   Return for pap smear.     Sharmila Bowman CNM   "

## 2017-10-13 NOTE — PROGRESS NOTES
"CHIEF COMPLAINT:  Followup regarding abdominal pain.      HISTORY OF PRESENT ILLNESS:  Samara is a 22-year-old woman seen here in Gastroenterology over 3 years who returns to see me.  She has had an increase in her abdominal pain over the last 2-3 weeks.  However, she has also had a bowel movement only once in the 2 weeks.  She has lactulose but is now living with her \"in-laws\" with a single bathroom and does not feel she is at liberty to use the bathroom.  She has not used the lactulose.  She has vomited 3 times in the last week, mucusy substance generally clear and occasionally with food.      Samara understands to have protein in the diet and had eggs and mushrooms for breakfast, for instance.  She is not eating regularly but rather a meal and perhaps some snacks during the day.  She continues to experience acid reflux and needs refills, including the omeprazole.  She continues on linaclotide 290 mcg, using it about every other day to every third day usually.      MEDICAL HISTORY:  Reviewed.  Note that she has had studies twice over the years documenting no Crohn's disease or intestinal involvement of her Behcet's.  She feels the clobetasol and colchicine now with Otezla control it quite well and she has not returned to the intensity of discomfort with her lesions, which continue both orally and at the labia.      SURGICAL HISTORY:  Nothing new.      FAMILY HISTORY:  Negative for GI disease or autoimmune disease other than her own.      SOCIAL HISTORY:  Never a tobacco user with rare low dose alcohol, no street drugs.  She has a partner now for 5 years who she is living with and will soon have an apartment with.      REVIEW OF SYSTEMS:  She acknowledges multiple symptoms of depression and of anxiety.  She denies symptoms of acute illness or localized infection.  She attributes her current worsening to the current social situation.      MEDICATIONS:  She has nausea from the Otezla and has tried using it " once daily and tried to return to twice daily dosing.      OBJECTIVE:   VITAL SIGNS:  Reviewed, stable.  Note pain 6/10 in the abdomen.  BMI 25.8.   GENERAL:  Clean, pleasant, neatly groomed, young woman with mildly flat affect who can perk up and have normal interactions.  She is here with her mother, Jinny.   HEENT:  Eyes are clear.   RESPIRATIONS:  Breathing is calm and grossly normal.   ABDOMEN:  Mildly diffusely tender, bloated.  There is no rigidity, guarding, rebound.   EXTREMITIES:  Without edema.      RESULTS:  Discussed at the last visit: normal ambulatory reflux screening procedure with DeMeester 9.2, only mildly acidic reflux, likely post-prandial.  Last upper GI endoscopy done in 06/2014 at [facility] with normal biopsies.   Continues to see Dr. Marquez at his new clinic, last seen 08/22/2017.   One episode of severe alcohol use with alcohol poisoning in June.    Abdominal x-ray in June with moderate stool throughout, CBC normal, CMP normal.      ASSESSMENT:  A woman with irritable bowel syndrome, constipation-predominant, now at acute phase with inadequate treatment of her constipation and inadequate ingestion.  Notes that she has had decrease in nausea with sucralfate but that it increases nonetheless with Otezla without successful treatment from sucralfate.  She has a number of rheumatologic symptoms, including the Behcet's diagnosed 2014, also chronic polyarthralgia, folliculitis, and recent finding of neurologic symptoms, which may be complicated migraine or something else -- epileptic episodes.  The abdominal symptoms are chronic and waxing and waning, partially related to her adherence to plan.      PLAN:   1.  Follow GERD precautions, use omeprazole daily.   2.  Return to treatment of her constipation with addition of the lactulose, may be low dose and increasing, should likely be repeated several days in a row and return use of linaclotide as well.   3.  Okay to continue  sucralfate, best on an empty stomach an hour or more before the next meal.   4.  Call as needed.   5.  Return to GI Clinic in 3-4 months.      We reviewed her symptoms, worsening of symptoms with her current social circumstances, past studies and no need to repeat them at this time.      It was my pleasure to meet and see Samara again as well as to see her mother.      Total visit 25 minutes with counseling time 15 minutes.         EVI DE LA FUENTE CNP             D: 10/12/2017 17:45   T: 10/13/2017 08:48   MT: NELY      Name:     SAMARA BAH   MRN:      [id number]        Account:      [id number]   :      1995           Service Date: 10/11/2017      Document: [id number]

## 2017-10-16 DIAGNOSIS — F95.9 TIC: ICD-10-CM

## 2017-10-16 NOTE — TELEPHONE ENCOUNTER
GUANFACINE (TENEX) 1MG ORAL TAB      Last Written Prescription Date:  10/6/17  Last Fill Quantity: 180,   # refills: 0  Last Office Visit with [facility], [facility] or [facility] prescribing provider: 6/23/17  Future Office visit:    Next 5 appointments (look out 90 days)     Oct 17, 2017  9:00 AM CDT   Return Visit with EVI Dahl CNP   [facility])    [address]  [facility] [address] [phone]            Oct 17, 2017 11:00 AM CDT   Office Visit with EVI Cardona CNP   [facility] ([facility]    [address] [place] [address] [address]  [facility] [id number] [phone]            Oct 18, 2017 11:30 AM CDT   Return Visit with Austen Marquez MD   [facility] ([facility])    [address] [id number] [phone]            Oct 19, 2017  2:30 PM CDT   Return Visit with Kimo Hudson LP   [facility] ([facility])    [address]  [facility] [id number] [phone]            Oct 31, 2017  9:15 AM CDT   Return Visit with Emily Rollins PT   [facility] ([facility])    [address]  [address]  [facility] [id number] [phone]                   Routing refill request to provider for review/approval because:  Drug not on the [facility], [facility] or [facility] refill protocol or controlled substance

## 2017-10-17 ENCOUNTER — OFFICE VISIT (OUTPATIENT)
Dept: PALLIATIVE MEDICINE | Facility: CLINIC | Age: 22
End: 2017-10-17
Payer: COMMERCIAL

## 2017-10-17 ENCOUNTER — OFFICE VISIT (OUTPATIENT)
Dept: FAMILY MEDICINE | Facility: CLINIC | Age: 22
End: 2017-10-17
Payer: COMMERCIAL

## 2017-10-17 VITALS
WEIGHT: 149 LBS | BODY MASS INDEX: 26.39 KG/M2 | DIASTOLIC BLOOD PRESSURE: 83 MMHG | SYSTOLIC BLOOD PRESSURE: 134 MMHG | HEART RATE: 100 BPM | OXYGEN SATURATION: 97 %

## 2017-10-17 VITALS
HEART RATE: 107 BPM | OXYGEN SATURATION: 99 % | DIASTOLIC BLOOD PRESSURE: 80 MMHG | TEMPERATURE: 97.8 F | SYSTOLIC BLOOD PRESSURE: 114 MMHG

## 2017-10-17 DIAGNOSIS — K58.8 OTHER IRRITABLE BOWEL SYNDROME: ICD-10-CM

## 2017-10-17 DIAGNOSIS — F41.1 GAD (GENERALIZED ANXIETY DISORDER): Primary | ICD-10-CM

## 2017-10-17 DIAGNOSIS — G89.4 CHRONIC PAIN SYNDROME: Primary | ICD-10-CM

## 2017-10-17 DIAGNOSIS — F95.2 TOURETTES SYNDROME: ICD-10-CM

## 2017-10-17 DIAGNOSIS — G89.29 OTHER CHRONIC PAIN: ICD-10-CM

## 2017-10-17 PROCEDURE — 99214 OFFICE O/P EST MOD 30 MIN: CPT | Performed by: NURSE PRACTITIONER

## 2017-10-17 PROCEDURE — 99212 OFFICE O/P EST SF 10 MIN: CPT | Performed by: NURSE PRACTITIONER

## 2017-10-17 NOTE — MR AVS SNAPSHOT
After Visit Summary   10/17/2017    Samara Oropeza    MRN: [id number]           Patient Information     Date Of Birth          1995        Visit Information        Provider Department      10/17/2017 9:00 AM Dione Blue APRN CNP [facility]        Care Instructions    After Visit Instructions:     Thank you for coming to [facility] for your care. It is my goal to partner with you to help you reach your optimal state of health.     I am recommending multidisciplinary care at this time.  The focus of care will be to continue gradual rehabilitation and pain management with medication adjustments as needed.    Continue daily self-care, identifying contributing factors, and monitoring variations in pain level. Continue to integrate self-care into your life.      1. Schedule pain psychology visits with Russell Mayo  2. Schedule physical therapy visits with Emily  3. Schedule follow-up with EVI Lobato, SHANEKA at your convenience. 60 minutes appt.   4. Medication recommendations: No change to current medications.       EVI Miller, NP-C  [facility]    Contact information: [facility]    Please call if any side effects, questions, or concerns arise.    Nurse Triage line:  [phone]   Call this number with any questions or concerns. You may leave a detailed message anytime. Calls are typically returned Monday through Friday between 8 AM and 4:30 PM. We usually get back to you within 2 business days depending on the issue/request.    Scheduling number: [phone].  Call this number to schedule or change appointments.          Medication Refills Policy:    For non-narcotic medications, please your pharmacy directly to request a refill and the pharmacy will call the [facility] for authorization. Please allow 3-4 days for these refills.    For narcotic  refills, call the nurse triage line and leave a message requesting your refill along with the name of the pharmacy that you use. Narcotic prescriptions will be mailed to your pharmacy or you may pick them up at the clinic.  Please call 7-10 days before your refill is due  The above policy allows adequate time so that you do not run out of medication.    No Show - Late Cancellation - Late Arrival Policy  We believe regular attendance is key to your success in our program.    Any time you are unable to keep your appointment we ask that you call us at  least 24 hours in advance to let us know. This will allow us to offer the appointment time to another patient. The following is our policy for missed appointments. This also applies to appointments cancelled with less than 24 hours notice.    You will receive a letter after missing your 1st and 2nd appointments without contacting the clinic before your scheduled appointment time.     After missing 3 appointments without calling first, we will cancel all of your future appointments at [facility].    At that point, you will not be able to resume services unless approved by your care team  We understand that unforseen circumstances arise, however, out of respect for all concerned and to provide this appointment to another patient, this policy will be enforced.    Please note that most follow up appointments are 30 minutes long. If you arrive late, your provider may not be able to see you for the entire 30 minutes. Please also note that if you arrive more than 15 minutes for any appointment, it may be rescheduled.                                     Follow-ups after your visit        Your next 10 appointments already scheduled     Oct 17, 2017 11:00 AM CDT   Office Visit with EVI Cardona CNP   [facility] ([facility])    [address] [place] [id number] [phone]           Bring  a current list of meds and any records pertaining to this visit. For Physicals, please bring immunization records and any forms needing to be filled out. Please arrive 10 minutes early to complete paperwork.            Oct 18, 2017 11:30 AM CDT   Return Visit with Austen Marquez MD   [facility] ([facility]    [address] [id number] [phone]            Oct 19, 2017  2:30 PM CDT   Return Visit with Kimo Hudson LP   [facility] ([facility])    [address] [place], [place] [address]  [facility] [address]  [facility] [id number] [phone]            Oct 25, 2017  7:00 AM CDT   (Arrive by 6:45 AM)   MR BRAIN W/O CONTRAST with [id number]   [facility] ([facility])    [address] [facility]  1st Floor  [facility] [id number] [phone]           Take your medicines as usual, unless your doctor tells you not to. Bring a list of your current medicines to your exam (including vitamins, minerals and over-the-counter drugs). Also bring the results of similar scans you may have had.  Please remove any body piercings and hair extensions before you arrive.  Follow your doctor s orders. If you do not, we may have to postpone your exam.  You will not have contrast for this exam. You do not need to do anything special to prepare.  The MRI machine uses a strong magnet. Please wear clothes without metal (snaps, zippers). A sweatsuit works well, or we may give you a hospital gown.   **IMPORTANT** THE INSTRUCTIONS BELOW ARE ONLY FOR THOSE PATIENTS WHO HAVE BEEN TOLD THEY WILL RECEIVE SEDATION OR GENERAL ANESTHESIA DURING THEIR MRI PROCEDURE:  IF YOU WILL RECEIVE SEDATION (take medicine to help you relax during your exam):   You must get the medicine from your doctor before you arrive. Bring the medicine to the exam. Do not take it at home.   Arrive one hour early. Bring someone who can take you  home after the test. Your medicine will make you sleepy. After the exam, you may not drive, take a bus or take a taxi by yourself.   No eating 8 hours before your exam. You may have clear liquids up until 4 hours before your exam. (Clear liquids include water, clear tea, black coffee and fruit juice without pulp.)  IF YOU WILL RECEIVE ANESTHESIA (be asleep for your exam):   Arrive 1 1/2 hours early. Bring someone who can take you home after the test. You may not drive, take a bus or take a taxi by yourself.   No eating 8 hours before your exam. You may have clear liquids up until 4 hours before your exam. (Clear liquids include water, clear tea, black coffee and fruit juice without pulp.)   You will spend four to five hours in the recovery room.  Please call the Imaging Department at your exam site with any questions.            Oct 25, 2017  8:30 AM CDT   (Arrive by 8:15 AM)   In Lab Video Visit with  EEG TECH 2   [facility] ([facility])    [address] [facility]  3rd [facility] [id number] [phone]            Oct 31, 2017  9:15 AM CDT   Return Visit with Emily Rollins PT   [facility] ([facility])    [address] [place] [id number] [phone]            Dec 06, 2017  3:00 PM CST   (Arrive by 2:45 PM)   Return Botox with Frederick Bender MD   [facility] ([facility])    [address] [facility]  3rd [facility] [id number] [phone]            Dec 12, 2017  2:45 PM CST   [facility] Visit with Cata Hurst NP   [facility]    [address]  [address]  [facility] [id number] [phone]            Jan 17, 2018  2:20 PM CST   (Arrive by 2:05 PM)   New Patient Visit with Estela Cuenca MD   [facility] ([facility])    [address]  [facility]  4th [facility] [id number] [phone]            Jan 24, 2018  2:00 PM CST   (Arrive by 1:45 PM)   Return Visit with EVI Vizcaino [facility] ([facility])    [address] [facility]  4th [facility] [id number] [phone]              Who to contact     If you have questions or need follow up information about today's clinic visit or your schedule please contact [facility] directly at [phone].  Normal or non-critical lab and imaging results will be communicated to you by MyChart, letter or phone within 4 business days after the clinic has received the results. If you do not hear from us within 7 days, please contact the clinic through [email or web address] or phone. If you have a critical or abnormal lab result, we will notify you by phone as soon as possible.  Submit refill requests through [employer] or call your pharmacy and they will forward the refill request to us. Please allow 3 business days for your refill to be completed.          Additional Information About Your Visit        MyChart Information     [employer] gives you secure access to your electronic health record. If you see a primary care provider, you can also send messages to your care team and make appointments. If you have questions, please call your primary care clinic.  If you do not have a primary care provider, please call [phone] and they will assist you.        Care EveryWhere ID     This is your Care EveryWhere ID. This could be used by other organizations to access your [facility] medical records  [id number]        Your Vitals Were     Pulse Last Period Pulse Oximetry BMI (Body Mass Index)          100 09/28/2017 (Exact Date) 97% 26.39 kg/m2         Blood Pressure from Last 3 Encounters:   10/17/17 134/83   10/13/17 132/88   10/11/17 111/75    Weight from Last 3 Encounters:   10/17/17 67.6 kg (149 lb)   10/13/17 65.8 kg  (145 lb)   10/11/17 66.2 kg (146 lb)              Today, you had the following     No orders found for display       Primary Care Provider Office Phone # Fax #    EVI Cardona [facility] [phone]       [address] [facility] [address]  [facility] [address]        Equal Access to Services     [facility] : Hadii aad ku hadasho Soomaali, waaxda luqadaha, qaybta kaalmada adeegyada, waxay idiin hayaan adeeg kharash la'aan . So [facility] [phone].    ATENCIÓN: Si henry nuñez, tiene a livingston disposición servicios gratuitos de asistencia lingüística. Michaelame al [phone].    We comply with applicable federal civil rights laws and [place] laws. We do not discriminate on the basis of race, color, national origin, age, disability, sex, sexual orientation, or gender identity.            Thank you!     Thank you for choosing [facility] [place]  for your care. Our goal is always to provide you with excellent care. Hearing back from our patients is one way we can continue to improve our services. Please take a few minutes to complete the written survey that you may receive in the mail after your visit with us. Thank you!             Your Updated Medication List - Protect others around you: Learn how to safely use, store and throw away your medicines at www.disposemymeds.org.          This list is accurate as of: 10/17/17  9:29 AM.  Always use your most recent med list.                   Brand Name Dispense Instructions for use Diagnosis    albuterol 108 (90 BASE) MCG/ACT Inhaler    PROAIR HFA/PROVENTIL HFA/VENTOLIN HFA    1 Inhaler    Inhale 2 puffs into the lungs every 6 hours as needed for shortness of breath / dyspnea or wheezing    Atypical chest pain       amitriptyline 50 MG tablet    ELAVIL    30 tablet    Take 1 tablet (50 mg) by mouth At Bedtime    Abdominal pain, generalized, Insomnia due to medical condition       apremilast 30 MG tablet    OTEZLA    60 tablet    20 mg in AM and 30mg in PM  daily X 2 weeks and then 30mg twice daily.    Behcet's disease (H)       ASPIRIN CHILDRENS 81 MG chewable tablet   Generic drug:  aspirin      Take 81 mg by mouth as needed        betamethasone valerate 0.1 % cream    VALISONE     Apply topically 2 times daily        * botulinum toxin type A 100 UNITS injection    BOTOX    200 Units    Inject 200 Units into the muscle every 3 months    Cervical dystonia       * ONABOTULINUMTOXINA IJ      Inject 165 Units as directed once Lot# /[id number] Expiration: 04/2020        carbamide peroxide 6.5 % otic solution    DEBROX     5 drops 2 times daily        clobetasol 0.05 % cream    TEMOVATE    60 g    Apply topically 2 times daily    Folliculitis       Colchicine 0.6 MG Caps     90 capsule    Take 0.6 mg by mouth See Admin Instructions 2 capsules in AM and 1 capsule in PM    Behcet's syndrome (H)       diclofenac 1 % Gel topical gel    VOLTAREN    100 g    Apply 2-4 grams to affected area(s) up to 4 times per day as needed. This is an anti-inflammatory medication.    Polyarthralgia       dicyclomine 20 MG tablet    BENTYL    60 tablet    Take 1 tablet (20 mg) by mouth 4 times daily as needed    Abdominal cramping       diphenhydrAMINE 25 MG tablet    BENADRYL    30 tablet    Take 1 tablet (25 mg) by mouth every 8 hours as needed for allergies        EPINEPHrine 0.3 MG/0.3ML injection 2-pack    EPIPEN 2-EAMON    0.6 mL    Inject 0.3 mLs (0.3 mg) into the muscle as needed for anaphylaxis    Hx of bee sting allergy       EXCEDRIN MIGRAINE PO      Take by mouth as needed        guanFACINE 1 MG tablet    TENEX    180 tablet    Take 3 tablets (3 mg) by mouth twice daily morning and evening.    Tic       hydrOXYzine 25 MG tablet    ATARAX    60 tablet    Take 1-2 tablets (25-50 mg) by mouth every 6 hours as needed for anxiety    TAHIR (generalized anxiety disorder)       hyoscyamine 0.125 MG tablet    ANASPAZ/LEVSIN    40 tablet    Take 1-2 tablets (125-250 mcg) by mouth every 4 hours as  needed for cramping    Abdominal pain, generalized       hypromellose 0.3 % Soln ophthalmic solution    GENTEAL     1 drop every hour as needed        LACTAID PO      Take by mouth daily        lactulose 20 GM/30ML Soln     300 mL    Take 30 mLs by mouth 3 times daily as needed    Constipation, unspecified constipation type       lidocaine 2 % topical gel    XYLOCAINE     Apply 1 Tube topically daily Reported on 4/21/2017        lidocaine visc 2% 2.5mL/5mL & maalox/mylanta w/ simeth 2.5mL/5mL & diphenhydrAMINE 5mg/5mL Susp suspension    [facility] [place]    1 Bottle    Swish and swallow 10 mLs in mouth every 6 hours as needed for mouth sores    Behcet's syndrome (H)       linaclotide 290 MCG capsule    LINZESS    90 capsule    Take 1 capsule (290 mcg) by mouth every morning (before breakfast)    Irritable bowel syndrome       LORazepam 1 MG tablet    ATIVAN    60 tablet    Take 0.5 tablets (0.5 mg) by mouth 2 times daily as needed for other (atypical chest pain) Do not operate a vehicle after taking this medication    Chronic abdominal pain       meclizine 25 MG tablet    ANTIVERT    30 tablet    Take 1 tablet (25 mg) by mouth every 6 hours as needed for dizziness    Nausea       norgestimate-ethinyl estradiol 0.25-35 MG-MCG per tablet    ORTHO-CYCLEN, SPRINTEC    84 tablet    Take 1 tablet by mouth daily    General counseling for prescription of oral contraceptives       omeprazole 40 MG capsule    priLOSEC    90 capsule    Take 1 capsule (40 mg) by mouth daily    Gastroesophageal reflux disease, esophagitis presence not specified       ondansetron 8 MG ODT tab    ZOFRAN-ODT    60 tablet    Take 1 tablet (8 mg) by mouth every 8 hours as needed for nausea    Non-intractable vomiting with nausea, unspecified vomiting type, Hematemesis, presence of nausea not specified       promethazine 25 MG tablet    PHENERGAN    20 tablet    Take 0.5-1 tablets (12.5-25 mg) by mouth every 6 hours as needed for nausea     Intractable chronic migraine without aura and without status migrainosus       sucralfate 1 GM/10ML suspension    CARAFATE    1200 mL    Take 10 mLs (1 g) by mouth 4 times daily    Bile reflux gastritis, Nausea       triamcinolone 0.1 % cream    KENALOG    15 g    Apply sparingly to oral ulcers three times daily for 14 days as needed.    Behcet's syndrome (H)       * Notice:  This list has 2 medication(s) that are the same as other medications prescribed for you. Read the directions carefully, and ask your doctor or other care provider to review them with you.

## 2017-10-17 NOTE — PATIENT INSTRUCTIONS
- I will get back to you about Guanfacine dose  - Next week pap smear, take hydroxyzine before you come  - Hydroxyzine , can take up to 4 at a time, it might make you tired.

## 2017-10-17 NOTE — TELEPHONE ENCOUNTER
Routing refill request to provider for review/approval because:  Drug not on the [facility] refill protocol     Britni Matamoros RN   [facility]

## 2017-10-17 NOTE — NURSING NOTE
"Chief Complaint   Patient presents with     RECHECK       Initial /80  Pulse 107  Temp 97.8  F (36.6  C) (Oral)  LMP 09/28/2017 (Exact Date)  SpO2 99% Estimated body mass index is 26.39 kg/(m^2) as calculated from the following:    Height as of 10/13/17: 5' 3\" (1.6 m).    Weight as of an earlier encounter on 10/17/17: 149 lb (67.6 kg).  Medication Reconciliation: complete      Omar Patel MA    "

## 2017-10-17 NOTE — PROGRESS NOTES
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 Lilliana Miramontes- complicated migraine  # On Sprintec for birth control   # Borderline transaminase elevation    4/17 Basic blood cell counts, liver and kidney function labs within normal limits except borderline ALT elevation. CRP within normal limits    Meets ISG 1990 criteria for Behcets. Initially, very good response to colchicine which has reduced the intensity and frequency of the oral ulcers in the past. Patient relapsed with genital ulcers and few oral ulcers in the end of 2016 and also with folliculitis in skin. Seen Derm Dr. Elliott. He recommended otezla. Otezla is approved for her now. Slow tapering up because of GI intolerance. Chest pain , mouth sores, hand pain, morning pain were all better when she tried otezla 30mg twice daily. Currently able to tolerate 30mg twice daily with some nausea. The severity and frequency of oral and genital ulcers have improved on otezla already. No episodes in the last 4 weeks. When she skipped couple of days of otezla, her symptoms were worse. Continue colchicine 1.2mg in AM and 0.6mg in PM daily.     Neurology investigating for seizures and cause.     Has tried prednisone in the past, but does not tolerate well due to mood issues, and has been off prednisone for the past 6+ months.  Has H/o Tourettes. Followed by Dr. Miramontes.     She continues to have GI symptoms  Colonoscopy was negative in the past.  Has gastroparesis.  Behcets may have GI involvement but no evidence of GI inflammation at this time. Dr. Baker has evaluated her. She follows with Kacie Almeida NP currently. I have sent a message to Kacie Almeida NP regarding her daily abdominal pain which she has today also. She was seen only recently in GI. Her abdominal pain is not related to otezla/colchicine as her abdominal symptoms were present even before they were started. I would clarify this with the patient.      She gets visual symptoms  But there is no evidence of uveitis including posterior uveitis or retinal vasculitis, denies symptoms at today's visit. She has seen Dr. Garcia in the past and also seen Dr. Heaton around 2/16 and 9/17. Patient still getting double vision and floaters but 9/17 eye exam was stable.      Left ankle sprain followed by podiatry - resolved.     Mother is concerned about HLA B51 testing. We discussed that this may not change the management at this point. Because the patient has chronic low back pain and morning stiffness, I recommended HLA B27 but there is some insurance coverage issue with this test.      She also likely has fibromyalgia which is uncontrolled. Her behcets treatment has been optimized now. The remnant pain is likely from fibromyalgia. I would request pain clinic to help with fibromyalgia management.     For chest symptoms, she has been referred to pulmonology by GI.     - Continue Triamcinolone acetonide cream (0.1 percent in Or[employer]) three to four times daily during attacks of oral ulcers and be used until pain from the ulcer ceases. Potent topical corticosteroids may be used for genital ulcers. Also use magic mouthwash as needed.  - Other comorbidities to watch for in Behcets: Vascular disease in Behçet s is more common in men. Large vessel vascular involvement occurs in  approximately one-third of patients with Behcet s disease. Pulmonary artery vasculitis can present with hemoptysis (misdiagnosis can lead to poor prognosis). Secondary fibromyalgia, CNS disease, amyloidosis, sacroiliitis.     Return in about 3 months (around 1/18/2018).    No orders of the defined types were placed in this encounter.      There are no discontinued medications.  Current Outpatient Prescriptions   Medication Sig Dispense Refill     amitriptyline (ELAVIL) 50 MG tablet Take 1 tablet (50 mg) by mouth At Bedtime 30 tablet 11     lactulose 20 GM/30ML SOLN Take 30 mLs by mouth 3 times daily as needed 300 mL 3     LORazepam (ATIVAN) 1 MG tablet Take 0.5 tablets (0.5 mg) by mouth 2 times daily as needed for other (atypical chest pain) Do not operate a vehicle after taking this medication 60 tablet 0     hydrOXYzine (ATARAX) 25 MG tablet Take 1-2 tablets (25-50 mg) by mouth every 6 hours as needed for anxiety 60 tablet 1     guanFACINE (TENEX) 1 MG tablet Take 3 tablets (3 mg) by mouth twice daily morning and evening. 180 tablet 0     ondansetron (ZOFRAN-ODT) 8 MG ODT tab Take 1 tablet (8 mg) by mouth every 8 hours as needed for nausea 60 tablet 6     ONABOTULINUMTOXINA IJ Inject 165 Units as directed once Lot# /C3  Expiration: 04/2020       Colchicine 0.6 MG CAPS Take 0.6 mg by mouth See Admin Instructions 2 capsules in AM and 1 capsule in PM 90 capsule 3     diphenhydrAMINE (BENADRYL) 25 MG tablet Take 1 tablet (25 mg) by mouth every 8 hours as needed for allergies 30 tablet 0     linaclotide (LINZESS) 290 MCG capsule Take 1 capsule (290 mcg) by mouth every morning (before breakfast) 90 capsule 1     sucralfate (CARAFATE) 1 GM/10ML suspension Take 10 mLs (1 g) by mouth 4 times daily 1200 mL 2     diclofenac (VOLTAREN) 1 % GEL topical gel Apply 2-4 grams to affected area(s) up to 4 times per day as needed. This is an anti-inflammatory medication. 100 g 4     aspirin (ASPIRIN CHILDRENS) 81 MG chewable  tablet Take 81 mg by mouth as needed        dicyclomine (BENTYL) 20 MG tablet Take 1 tablet (20 mg) by mouth 4 times daily as needed 60 tablet 1     omeprazole (PRILOSEC) 40 MG capsule Take 1 capsule (40 mg) by mouth daily 90 capsule 3     EPINEPHrine (EPIPEN 2-EAMON) 0.3 MG/0.3ML injection Inject 0.3 mLs (0.3 mg) into the muscle as needed for anaphylaxis 0.6 mL 3     apremilast (OTEZLA) 30 MG tablet 20 mg in AM and 30mg in PM daily X 2 weeks and then 30mg twice daily. 60 tablet 3     norgestimate-ethinyl estradiol (ORTHO-CYCLEN, SPRINTEC) 0.25-35 MG-MCG per tablet Take 1 tablet by mouth daily 84 tablet 3     albuterol (PROAIR HFA/PROVENTIL HFA/VENTOLIN HFA) 108 (90 BASE) MCG/ACT Inhaler Inhale 2 puffs into the lungs every 6 hours as needed for shortness of breath / dyspnea or wheezing 1 Inhaler 1     promethazine (PHENERGAN) 25 MG tablet Take 0.5-1 tablets (12.5-25 mg) by mouth every 6 hours as needed for nausea 20 tablet 1     meclizine (ANTIVERT) 25 MG tablet Take 1 tablet (25 mg) by mouth every 6 hours as needed for dizziness 30 tablet 1     Magic Mouthwash (FV std formula) lidocaine visc 2% 2.5mL/5mL & maalox/mylanta w/ simeth 2.5mL/5mL & diphenhydrAMINE 5mg/5mL Swish and swallow 10 mLs in mouth every 6 hours as needed for mouth sores 1 Bottle 1     clobetasol (TEMOVATE) 0.05 % cream Apply topically 2 times daily 60 g 0     botulinum toxin type A (BOTOX) 100 UNITS injection Inject 200 Units into the muscle every 3 months 200 Units 3     hyoscyamine (ANASPAZ,LEVSIN) 0.125 MG tablet Take 1-2 tablets (125-250 mcg) by mouth every 4 hours as needed for cramping 40 tablet 1     Aspirin-Acetaminophen-Caffeine (EXCEDRIN MIGRAINE PO) Take by mouth as needed        hypromellose (GENTEAL) 0.3 % SOLN 1 drop every hour as needed       betamethasone valerate (VALISONE) 0.1 % cream Apply topically 2 times daily       carbamide peroxide (DEBROX) 6.5 % otic solution 5 drops 2 times daily       Lactase (LACTAID PO) Take by mouth  daily       lidocaine (XYLOCAINE) 2 % jelly Apply 1 Tube topically daily Reported on 4/21/2017       triamcinolone (KENALOG) 0.1 % cream Apply sparingly to oral ulcers three times daily for 14 days as needed. 15 g 1     Austen Marquez MD.           Active Problem List:     Patient Active Problem List    Diagnosis Date Noted     Vitamin D deficiency 10/11/2017     Priority: Medium     How low, unknown/not found. On D when tested at 27. Starting cholecalciferol October 2017. Needs recheck.       Convulsions, unspecified convulsion type (H) 10/03/2017     Priority: Medium     Transient alteration of awareness 10/03/2017     Priority: Medium     Chronic pain syndrome 07/27/2017     Priority: Medium     Major depressive disorder, recurrent episode, moderate (H) 06/27/2017     Priority: Medium     Cervical pain 05/02/2017     Priority: Medium     Acute left ankle pain 03/31/2017     Priority: Medium     Cervical dystonia 03/28/2017     Priority: Medium     PTSD (post-traumatic stress disorder) 01/17/2017     Priority: Medium     Left knee pain 10/20/2016     Priority: Medium     Patellofemoral instability 10/20/2016     Priority: Medium     Fibromyalgia 08/04/2016     Priority: Medium     Rheumatoid arthritis of multiple sites without rheumatoid factor (H) 08/04/2016     Priority: Medium     Raynaud's disease without gangrene 08/04/2016     Priority: Medium     Chronic abdominal pain 08/04/2016     Priority: Medium     Palpitations 01/12/2016     Priority: Medium     On colchicine therapy 10/30/2015     Priority: Medium     Spell of shaking 05/06/2015     Priority: Medium     Migraine 02/04/2015     Priority: Medium     Problem list name updated by automated process. Provider to review       Behcet's disease (H) 12/10/2014     Priority: Medium     Headaches due to old head injury 11/12/2013     Priority: Medium     Milk protein intolerance 10/11/2013     Priority: Medium     Concussion 02/13/2013     Priority:  Medium     Jan 2013, with prolonged recovery- followed by sports med         Knee pain 01/03/2013     Priority: Medium     TAHIR (generalized anxiety disorder) 06/25/2009     Priority: Medium     Tics - Tourette syndrome 05/18/2009     Priority: Medium     Followed by psychotherapy. Symptoms well managed. Originally diagnosed at [facility] neurolog[facility]. (Dr. Simpson)           IBS (irritable bowel syndrome) 05/18/2009     Priority: Medium     Seasonal allergic rhinitis 05/18/2009     Priority: Medium          History of Present Illness:     Chief Complaint   Patient presents with     RECHECK     [place] [facility] [place] [facility] [place]    Original presentation 2014:    Ms Oropeza is a 19 y.o. female who presents with one year of presentation of painful oral ulcers (monthly) once that have worsened with two episodes in the last two months that presented with painful vulvar or vaginal ulcers (2 episodes so far every month) [not sure if they leave scar] as well that timing coincides with menses ([facility]: 8/25/14).   ORAL ULCERS: last two episodes were severe, painful, white base with erythematous halo, that appear and disappear in the matter of 1-2 weeks without, does not bleed and doesn't respond to any treatment used (magic mouthwash), 10-15 in number with the biggest one being dime size.     VAGINAL ULCERS: 2-3 in number between labia majora and minora that occur simultaneously with oral ulcers, painful, non bleeding ulcers that are erythematous, no pus.     FOLLICULITIS: patient reports having spots in legs specially around ankle and knee, but arms, and neck are affected as well. Small lesions that resemble ingrown hair, most are red erythematous elevated lesions, well demarcated, some with liquid that patient refers as pimple like.     SKIN RASHES: welts and red macules with well defined borders that are pruritic and non-ulcerative on chest, arms and back. Benadryl relieves.     ARTHRALGIAS: In descending order of  severity: hips, knees, wrists, shoulders, neck, lumbar, fingers.   C/o morning stiffness in hips, back and neck lasting for about until noon.     Ms. Oropeza reports they present in severe flares and never fully resolve, but do get better. She has seen some swelling and warmth on the affected joints but has not noticed and redness. Has tried Tylenol, ibuprofen, and hydrocodone with not much benefit.     FEVERS: Reports 3-4 sporadic episodes per month of self limited fevers of 38 C that occur during the evenings and associate facial flushing.     HEADACHES: occur daily and are bitemporal and irradiate occipitally, pulsatile in nature, intensity varies from intense to mild, are worsened with movement. On treatment with ibuprofen and somatriptan without any positive results.     VISION CHANGES: Patient reports that suddenly she would only see a gray blotch in her right eyes and would persist like this for a day and a half. Also reports blurriness in her left eye. Presence of pain and burning sensation of eyeball with associated redness. Has changed prescription glasses in the matter of 2 years 3 times. She has had opthalmology exam and was not suggestive of any evidence of uveitis.   She was also evaluated in ED for a dizzy spell.     ABD PAIN W/ INTERMITTENT DIARRHEA AND CONSTIPATION: Patient reports abdominal pain that is intermittent, periods of 7 days without bowel movement and feeling bloated with change of pattern to diarrhea (liquidy without blood nor mucus, non foul smelling). Has taken Bentyl, Zegrid, and rinetidine with mild clinical improvement.  She also reports small red nodules after each needle stick for blood draw.   Neurology saw her back then and was not impressed with her presentation as physical examination was normal. Also MRI brain normal: Findings: No definite hemorrhage, mass affect, midline shift, or ventriculomegaly is noted.There are a few scattered non specific white matter T2  hyperintensities. Axial diffusion weighted images are unremarkable.     The major vascular structures appear patent. There is opacification and severe mucosal thickening in the right maxillary sinus. The remaining paranasal sinuses, and mastoid air cells are clear. Orbits are unremarkable  She has + HSV-1 IGG. Seen ID. Negative for Herpes simplex virus culture. HSV IGM I/II COMBINATION SENT TO [facility]  RESULTS = <0.91  REF RANGE: <0.91 = NEGATIVE  ID did not think HSV could explain her mouth and genital sores.     CRP within normal limits. HIV negative. CBC within normal limits; UA negative. Creatinine within normal limits   CYNDIE neg.  Antigliadin neg.   ANti TTG neg.   [facility] 2014: Colonoscopy and endoscopy neg; result not available. Not sure if biopsies were done.   Raynaud's phenomenon:  Onset: about 2013  Digits: all fingers  Digital ulcers: no  Color changes: white ---> purple and red  Duration of an attack: About couple of hours per mom  Pain during attack: not clear pain but bruise like feeling  Frequency of attacks: few times a month  Family history of Raynauds: no  GERD: very long time    No hemoptysis.   No miscarriages/ no thrombosis in past.   No family or personal history of psoriasis, ulcerative colitis or chron's disease.   No buttock pain or low back pain or stiffness in AM    Interim history:  Dec 2014- Multiple mouth ulcers and did not eat for 5 days. This coincided with periods. Colchicine 0.6mg PO BID started in Nov 2014.   Prednisone taper in Dec 20mg to 0 in 4 weeks. She also used magic mouthwash. Kenalog cream. After going to the ER, 3 days later her ulcers were better. She thinks it improved after the menstruation stopped.   LMP 3 weeks ago.   COLCHICINE HAS HELPED A LOT REDUCING THE INTENSITY OF MENSTRUATION ASSOCIATED ORAL AND VULVAR ULCERS.     Interim history: 6/15    Since last visit only two episodes of mouth sores- small sized 6   No genital ulcers since last visit.   Colchicine 1.2 mg  "in AM and 0.6mg in PM keeps her symptoms under control.     Admitted in 5/15:  Admission Diagnoses:  - LUE weakness  - spell  - hx of migraines  - hx of Tourette syndrome  - hx of Behcet's disease    Discharge Diagnoses:   - spell likely 2/2 anxiety  - hx of migraines  - hx of Tourette syndrome  - hx of Behcet's disease    CT and MRI brain was normal.     Seeing Dr. Lilliana Miramontes soon as outpatient.     Dr. Garcia did not find any intraocular inflammation.      Interm 10/30/2015  ED for migraine. Continues to see neuro - MRI brain without lesions noted. Saw GI - upper barium normal. May be considering repeat colo. Worsening lesions in mouth and genitals. Has had continuous lesion in mouth x 2 months. 2 genital ulcers. Had fracture of right sesamoid in foot. Continues to have low grade fevers. New folliculitis on chest, legs and arms.     Interim hx: 1/11/2016    Pt states that since last visit she continues to have oral ulcers and has noted more folliculitis-like lesions on her lower extremities.  She states that on her right lower leg she had a red macular spot that has since resolved.  She denies uveitis.  She states that the colchicine initially helped but then stopped working.  She takes 0.6 mg TID.  She stopped taking her prednisone last week as she feels like this hasn't helped.  She hasn't had any episodes of vaginal ulcers.      She saw GI who stated she has gastroparesis.  She is seeing Cardiology later this week for possible syncopal episodes.      Interim history 5/16  Mouth ulcers X 1 last month  Genital ulcers - none since last visit.     Bilateral MCPs pain, knee pain and low back pain +ve increased since last visit  Morning stiffness X 2 hours (increasing)   5-6/10    \"overall myalgia\" has gotten worse    C/o pseudofolliculitis lesion on anterior shins bilaterally worse    Interim history: (7/18/2016)  Patient has just started Humira for 2 doses on 7/1 and 7/15 and reported minimal improvement of " her hip pain but otherwise, did not notice anything different.     She reported painful mouth ulcer once a month since last visit but noticed that it has been getting deeper.   Denied any genital ulcers.    Still has ongoing bilateral MCPs pain, knee pain but this has been intermittent and she did not have any much pain today. She reported 2-3 hours morning stiffness of both hands and pain score of 6/10 at her knees and 8/10 of her hands but currently takes no pain medication except prn ativan which she takes when her muscle is really tight.     The only concern is that she gained weight even though she has not been eating much (eat once a day) and do exercises every day for 1 hour (with video of yoga, pilates). Her mother wonders if she needs to have some labs work up include sex hormone, thyroid, cortisol.    Interval history 9/14/16  Patient was started Humira at the last visit, patient reports worsening of skin ulcers since starting Humira and stopped taking Humura for the past 2 weeks. Patient reports oral and genital ulcers has been stable even before starting Humira and has not had any interval oral/genital ulcers. However she reports recurrent skin ulcers occurring on a daily basis that affects her chest and anterior legs. Patient also has stopped taking prednisone, she reports that she doesn't feel the prednisone helps, her last dose of prednisone was 6+ months ago. Patient also report worsening low back pain that sometimes causes her to limp.    In the interval patient also visited ED on 8/31/16 for left hand pain and what the patient describes as phlebitis, no medication or procedure was done and the patient was discharged with a splint. Patient also reported 1 episode of chest pressure that was associated with dyspnea and numbness of the left arm, she was shopping in a supermarket at the time of onset, and the pressure resolved after she took a nap.    Patient denies any infectious symptoms in the  interval, no fever, chills, night sweat, cough, dysuria, denies eye pain or visual changes.    November 4, 2016  Oct - had one genital ulcer  Oral ulcers X 5- around menstruation time.   Knee pain- chronic on the left side  Dizziness spells - on and off; been to the ER for that in the past.   On and off migraines  July 2013 - s/p MPFL reconstruction plus LRL 7/2013  Morning stiffness in knee (left) X 1 hour    Severe jaw pain and chest pain- patient wondering if this is Tourette's or esophageal spasms. Cardiac workup negative.   Fever     January 13, 2017  Yesterday in ER [facility] with orthostatic syncope from dehydration.   Chest pain on and off still continues. Painful with deep breaths.   Oral ulcers - few minor ones lasting for one week;   One major one in roof of mouth lasting for about one week.   Knee pain +ve - reviewed the recent MRI with her orthopedic surgeon.   On and off migraines  otezla is approved. Still waiting to receive the meds.     March 31, 2017  Otezla current dose 15mg PO BID.   She is tolerating okay at this dose and is willing to increase the dose further up to 30mg BID.   Her joint pains are better on otezla. Knee pain is also better.   Back and neck pain +ve 8/10 when it is worse. Intramuscular botox injections were given on Monday in PMR.   2 minor genital ulcers in the interim- resolved.   Few oral ulcers in the interim- resolved.   Nasal ulcer - resolved.   Migraines on and off.   Nausea with otezla but improving.   Dizziness and blackouts on and off. She fell once and hurt her ankle. Wearing boot in left leg.   Complete ROS negative except for above    May 17, 2017  Tolerating otezla better. Not throwing up anymore. Current dose 20mg in AM and 30mg in PM (2nd week).   Patient thinks that her oral ulcers frequency and severity are improving with otezla. Also no genital ulcers in the interim.   Currently on doxycycline for bronchitis/?pneunomia. 4 more days left.     Joint pain history  2  weeks ago/ dx with pneumonia 1 week ago/  Involved joints: all over  Pain scale: 6.5/10   Wakes the patient from sleep : Yes  Morning stiffness: Yes for 120 minutes  Meds used:otezla,colchicine     Interim history  Since last visit:  1. Infections - Yes/ pneumonia  2. New symptoms/medical problem - Yes/ thinks she may have had a mini-seizure about 10 days ago ; did not seek medical attention; did not reoccur after that. Patient seeing PCP today.  3. Any side effects from Rheum medications -vomiting a lot (resolved)  3. ER visits/Hospitalizations/surgeries - Yes  4. Last PCP visit: has an apt. today    Patient still getting double vision. Floaters present.     Therapist recommended psychiatry evaluation. Patient does not want to see psychaitry. Patient will see PCP today.     August 22, 2017  Have you ever seen a rheumatologist Yes Who You When 5/17/17    NO genital ulcers in the interim.   Mouth ulcers- three in the back of the throat and roof of the mouth last month.     Joint pain history  Onset: doing alittle worse / cut her otezla back to 30mg  Daily due to GI problems  Involved joints: all over her body. Been having more black out episodes, been having more chest pains.also has raised bumps on both legs from the knees down that itch and are painful   Pain scale:  5.5/10     Wakes the patient from sleep : Yes  Morning stiffness:Yes for 120 minutes  Meds used:otezla, colchicine (three pills daily)     Interim history  Since last visit:  1. Infections - Yes stomach issue  2. New symptoms/medical problem - No  3. Any side effects from Rheum medications -nausea from otezla  3. ER visits/Hospitalizations/surgeries - Yes, ER for foot, ankle injury from passing out and fell down the steps. Hit her head another time from passing out. Alcohol poisoning in July  4. Last PCP visit: 6/23/17 September 19, 2017  Have you ever seen a rheumatologist Yes Who You When 8/22/17  Joint pain history  Onset: pt states that she  hurts everywhere for 6 days  Involved joints: all joints  Pain scale:  7/10     Wakes the patient from sleep : Yes/ not sleeping at all  Morning stiffness:Yes for 180 minutes  Meds used:colchicine, otezla, magic mouth wash, lidocaine gel  Last one week: increased joint pain; and genital ulcer  Genital lesion (dimed size) in the last one week  After botox a week ago, she had fever 101 for three days; near syncopes. Back pain; floaters  Chest pain , mouth sores, hand pain, morning pain were all better with otezla 30mg twice daily.    Interim history  Since last visit:  1. Infections - No  2. New symptoms/medical problem - No  3. Any side effects from Rheum medications -nausea from the otezla  3. ER visits/Hospitalizations/surgeries - No  4. Last PCP visit: may 2017     October 18, 2017     Have you ever seen a rheumatologist Yes Who You When 9/19/17  Joint pain history  NO mouth or genital sores in the last 4 weeks.   Medrol dosepak helped with visual symptoms but not joint pains.     Onset: pt states that she is doing better than last time with her behcet's. Today has severe stomach pains, states that she is constipated. Pt had a seizure 2 days ago. Does have a metallic taste in her mouth  Involved joints: hands , neck, shoulders  Pain scale:  9/10 from stomach pain;      Wakes the patient from sleep : Yes  Morning stiffness:Yes for 120 minutes  Meds used:cochicine , otezla 30mg twice daily     Interim history  Since last visit:  1. Infections - No  2. New symptoms/medical problem - Yes/ the cartilage in her chest is inflamed  3. Any side effects from Rheum medications -nausea from the otezla  3. ER visits/Hospitalizations/surgeries - No  4. Last PCP visit: yesterday         Past Medical History:     Past Medical History:   Diagnosis Date     Anxiety      Arthritis      Behcet's disease (H)      Cervical adenitis May 2010     Chronic abdominal pain      Constipation, chronic 1994     Gastro-oesophageal reflux disease       Gastroparesis      Migraines      Neuromuscular disorder (H)     fibramyalgia     Palpitations      Seizure (H)      Seizures (H)     unknown etiology     Syncope      Tourette's      Past Surgical History:   Procedure Laterality Date     ARTHROSCOPY KNEE WITH PATELLAR REALIGNMENT  7/25/2013    Procedure: ARTHROSCOPY KNEE WITH PATELLAR REALIGNMENT;  Left Knee Arthroscopy, Medial Patellofemoral Ligament Reconstruction with Allograft  ;  Surgeon: Jennifer Acevedo MD;  Location: [facility]     COLONOSCOPY  2015     DENTAL SURGERY  1996    Teeth removal     ENDOSCOPY UPPER, COLONOSCOPY, COMBINED  2005     HC ESOPH/GAS REFLUX TEST W NASAL IMPED >1 HR N/A 2/15/2017    Procedure: ESOPHAGEAL IMPEDENCE FUNCTION TEST WITH 24 HOUR PH GREATER THAN 1 HOUR;  Surgeon: Timothy Matta MD;  Location:  GI          Social History:     Social History     Occupational History     Not on file.     Social History Main Topics     Smoking status: Never Smoker     Smokeless tobacco: Never Used     Alcohol use 0.6 oz/week     1 Standard drinks or equivalent per week      Comment: MONTHLY     Drug use: No     Sexual activity: Yes     Partners: Male     Birth control/ protection: Pill          Family History:     Family History   Problem Relation Age of Onset     Depression Mother      Neurologic Disorder Mother      Migraines, take imitrex injection.  Also in maternal grandmother.       Alcohol/Drug Father      Hypertension Father      Depression Father      Cardiovascular Maternal Grandmother      Depression Maternal Grandmother      Hypertension Maternal Grandmother      Alzheimer Disease Maternal Grandmother      Cardiovascular Maternal Grandfather      Hypertension Maternal Grandfather      Depression Maternal Grandfather      Alcohol/Drug Maternal Grandfather      Cardiovascular Paternal Grandmother      Hypertension Paternal Grandmother      Cardiovascular Paternal Grandfather      Hypertension Paternal Grandfather       Glaucoma No family hx of      Macular Degeneration No family hx of           Allergies:     Allergies   Allergen Reactions     Amoxil [Penicillins] Rash     Dad unsure of reaction.     Bee Venom Anaphylaxis     Contrast Dye Rash     [place] [employer] [facility] [place] [employer] [facility] [place]  NOTE: this is a contrast media oral with iodine. Premedicate with methylpred standard for IV contrast, request barium contrast for oral contrast.     Kiwi Swelling     Orange Fruit [Citrus] Anaphylaxis     Pineapple Anaphylaxis, Difficulty breathing and Rash     Milk Protein Extract Hives     Reglan [Metoclopramide] Other (See Comments)     IV dose only, in ER, rapid heart rate.     Ace Inhibitors      Difficulty in breathing and GI upset     Amitiza [Lubiprostone] Nausea and Vomiting     Amoxicillin-Pot Clavulanate      Latex      Midazolam Unknown     Other reaction(s): Unknown  parent states that when pt takes this medication, she wakes up being very violent .  parent states that when pt takes this medication, she wakes up being very violent .     Nuts      [facility]rast [employer] [facility] [place] [facility] [employer] [place].       Versed      Coming out of pelvic exam at age of 5, was kicking and screaming when coming out of the versed.     Adhesive Tape Rash     Azithromycin Hives and Rash     Cephalexin Itching and Rash     Itchy mouth  Itchy mouth     Keflex [Cephalexin-Fd&C Yellow #6] Hives     Lac Bovis Rash and GI Disturbance     Other reaction(s): Abdominal Pain          Medications:     Current Outpatient Prescriptions   Medication Sig Dispense Refill     amitriptyline (ELAVIL) 50 MG tablet Take 1 tablet (50 mg) by mouth At Bedtime 30 tablet 11     lactulose 20 GM/30ML SOLN Take 30 mLs by mouth 3 times daily as needed 300 mL 3     LORazepam (ATIVAN) 1 MG tablet Take 0.5 tablets (0.5 mg) by mouth 2 times daily as needed for other (atypical chest pain) Do not operate a  vehicle after taking this medication 60 tablet 0     hydrOXYzine (ATARAX) 25 MG tablet Take 1-2 tablets (25-50 mg) by mouth every 6 hours as needed for anxiety 60 tablet 1     guanFACINE (TENEX) 1 MG tablet Take 3 tablets (3 mg) by mouth twice daily morning and evening. 180 tablet 0     ondansetron (ZOFRAN-ODT) 8 MG ODT tab Take 1 tablet (8 mg) by mouth every 8 hours as needed for nausea 60 tablet 6     ONABOTULINUMTOXINA IJ Inject 165 Units as directed once Lot# /C3  Expiration: 04/2020       Colchicine 0.6 MG CAPS Take 0.6 mg by mouth See Admin Instructions 2 capsules in AM and 1 capsule in PM 90 capsule 3     diphenhydrAMINE (BENADRYL) 25 MG tablet Take 1 tablet (25 mg) by mouth every 8 hours as needed for allergies 30 tablet 0     linaclotide (LINZESS) 290 MCG capsule Take 1 capsule (290 mcg) by mouth every morning (before breakfast) 90 capsule 1     sucralfate (CARAFATE) 1 GM/10ML suspension Take 10 mLs (1 g) by mouth 4 times daily 1200 mL 2     diclofenac (VOLTAREN) 1 % GEL topical gel Apply 2-4 grams to affected area(s) up to 4 times per day as needed. This is an anti-inflammatory medication. 100 g 4     aspirin (ASPIRIN CHILDRENS) 81 MG chewable tablet Take 81 mg by mouth as needed        dicyclomine (BENTYL) 20 MG tablet Take 1 tablet (20 mg) by mouth 4 times daily as needed 60 tablet 1     omeprazole (PRILOSEC) 40 MG capsule Take 1 capsule (40 mg) by mouth daily 90 capsule 3     EPINEPHrine (EPIPEN 2-EAMON) 0.3 MG/0.3ML injection Inject 0.3 mLs (0.3 mg) into the muscle as needed for anaphylaxis 0.6 mL 3     apremilast (OTEZLA) 30 MG tablet 20 mg in AM and 30mg in PM daily X 2 weeks and then 30mg twice daily. 60 tablet 3     norgestimate-ethinyl estradiol (ORTHO-CYCLEN, SPRINTEC) 0.25-35 MG-MCG per tablet Take 1 tablet by mouth daily 84 tablet 3     albuterol (PROAIR HFA/PROVENTIL HFA/VENTOLIN HFA) 108 (90 BASE) MCG/ACT Inhaler Inhale 2 puffs into the lungs every 6 hours as needed for shortness of breath  / dyspnea or wheezing 1 Inhaler 1     promethazine (PHENERGAN) 25 MG tablet Take 0.5-1 tablets (12.5-25 mg) by mouth every 6 hours as needed for nausea 20 tablet 1     meclizine (ANTIVERT) 25 MG tablet Take 1 tablet (25 mg) by mouth every 6 hours as needed for dizziness 30 tablet 1     Magic Mouthwash (FV std formula) lidocaine visc 2% 2.5mL/5mL & maalox/mylanta w/ simeth 2.5mL/5mL & diphenhydrAMINE 5mg/5mL Swish and swallow 10 mLs in mouth every 6 hours as needed for mouth sores 1 Bottle 1     clobetasol (TEMOVATE) 0.05 % cream Apply topically 2 times daily 60 g 0     botulinum toxin type A (BOTOX) 100 UNITS injection Inject 200 Units into the muscle every 3 months 200 Units 3     hyoscyamine (ANASPAZ,LEVSIN) 0.125 MG tablet Take 1-2 tablets (125-250 mcg) by mouth every 4 hours as needed for cramping 40 tablet 1     Aspirin-Acetaminophen-Caffeine (EXCEDRIN MIGRAINE PO) Take by mouth as needed        hypromellose (GENTEAL) 0.3 % SOLN 1 drop every hour as needed       betamethasone valerate (VALISONE) 0.1 % cream Apply topically 2 times daily       carbamide peroxide (DEBROX) 6.5 % otic solution 5 drops 2 times daily       Lactase (LACTAID PO) Take by mouth daily       lidocaine (XYLOCAINE) 2 % jelly Apply 1 Tube topically daily Reported on 4/21/2017       triamcinolone (KENALOG) 0.1 % cream Apply sparingly to oral ulcers three times daily for 14 days as needed. 15 g 1          Physical Exam:   Blood pressure 110/80, pulse 106, temperature 98.1  F (36.7  C), temperature source Oral, weight 67.6 kg (149 lb), last menstrual period 09/28/2017, SpO2 100 %, not currently breastfeeding.  Wt Readings from Last 4 Encounters:   10/18/17 67.6 kg (149 lb)   10/17/17 67.6 kg (149 lb)   10/13/17 65.8 kg (145 lb)   10/11/17 66.2 kg (146 lb)     Constitutional: well-developed, appearing stated age; cooperative  Eyes: nl EOM, PERRLA, vision, conjunctiva, sclera  ENT: No oral ulcer seen.   nl external ears, nose, hearing, lips,  teeth, gums, throat  No mucous membrane lesions, normal saliva pool  Neck: no mass or thyroid enlargement  Resp: lungs clear to auscultation,   CV: RRR, no murmurs, rubs or gallops, no edema  GI: diffuse abdominal tenderness; no ABD mass; no HSM  : not tested  Lymph: no cervical, supraclavicular or epitrochlear nodes  MS: All shoulder, elbow, wrist, MCP/PIP/DIP, hip, ankle joints were examined and  found normal except tenderness on even light touch of both knees with minimal swelling but no warmth. No active synovitis or deformity. Full ROM.  Normal  strength. Fist 100%.  No dactylitis,  tenosynovitis, enthesopathy.  Skin: no nail pitting, alopecia, rash  Psych: nl judgement, orientation, memory, affect.         Data:     CBC RESULTS:   Recent Labs   Lab Test  06/06/17 2048   WBC  4.7   RBC  4.09   HGB  12.0   HCT  35.9   MCV  88   MCH  29.3   MCHC  33.4   RDW  13.1   PLT  189       Liver Function Studies -   Recent Labs   Lab Test  06/06/17 2048   PROTTOTAL  6.7*   ALBUMIN  3.8   BILITOTAL  0.3   ALKPHOS  91   AST  26   ALT  44       Creatinine   Date Value Ref Range Status   06/06/2017 0.77 0.52 - 1.04 mg/dL Final   ]    No results found for: URIC]    HLA-B27  No results for input(s): [id number], B1 in the last 53260 hours.  RF/CCP  Recent Labs   Lab Test  05/06/16   1554   CCPIGG  1   RHF  <20     CYNDIE/RNP/Sm/SSA/SSB  Recent Labs   Lab Test  11/01/16   1318   TREPAB  Negative     ESR/CRP  Recent Labs   Lab Test  04/21/17   1249  04/14/17   1351  11/06/16   1341  03/11/16   1350  11/18/15   1453   SED   --    --   4  5  6   CRP  <2.9  <2.9  <2.9  <2.9  <2.9

## 2017-10-17 NOTE — MR AVS SNAPSHOT
After Visit Summary   10/17/2017    Samara Oropeza    MRN: [id number]           Patient Information     Date Of Birth          1995        Visit Information        Provider Department      10/17/2017 11:00 AM Sonja Abreu APRN CNP [facility] Instructions    - I will get back to you about Guanfacine dose  - Next week pap smear, take hydroxyzine before you come  - Hydroxyzine , can take up to 4 at a time, it might make you tired.           Follow-ups after your visit        Your next 10 appointments already scheduled     Oct 18, 2017 11:30 AM CDT   Return Visit with Austen Marquez MD   [facility] ([facility]    [address] [id number] [phone]            Oct 19, 2017  2:30 PM CDT   Return Visit with Kimo Hudson LP   [facility] ([facility])    [address]  [address]  [facility] [id number] [phone]            Oct 23, 2017  3:30 PM CDT   Return Visit with EVI Dahl CNP   [facility] ([facility])    [address]  [facility] [id number] [phone]            Oct 25, 2017  7:00 AM CDT   (Arrive by 6:45 AM)   MR BRAIN W/O CONTRAST with [id number]   [facility] ([facility])    [address] [facility]  1st Floor  [facility] [id number] [phone]           Take your medicines as usual, unless your doctor tells you not to. Bring a list of your current medicines to your exam (including vitamins, minerals and over-the-counter drugs). Also bring the results of similar scans you may have had.  Please remove any body piercings and hair extensions before you arrive.  Follow your doctor s orders. If you do not, we may have to postpone your exam.  You will not have contrast for this exam. You do not need to do anything special  to prepare.  The MRI machine uses a strong magnet. Please wear clothes without metal (snaps, zippers). A sweatsuit works well, or we may give you a hospital gown.   **IMPORTANT** THE INSTRUCTIONS BELOW ARE ONLY FOR THOSE PATIENTS WHO HAVE BEEN TOLD THEY WILL RECEIVE SEDATION OR GENERAL ANESTHESIA DURING THEIR MRI PROCEDURE:  IF YOU WILL RECEIVE SEDATION (take medicine to help you relax during your exam):   You must get the medicine from your doctor before you arrive. Bring the medicine to the exam. Do not take it at home.   Arrive one hour early. Bring someone who can take you home after the test. Your medicine will make you sleepy. After the exam, you may not drive, take a bus or take a taxi by yourself.   No eating 8 hours before your exam. You may have clear liquids up until 4 hours before your exam. (Clear liquids include water, clear tea, black coffee and fruit juice without pulp.)  IF YOU WILL RECEIVE ANESTHESIA (be asleep for your exam):   Arrive 1 1/2 hours early. Bring someone who can take you home after the test. You may not drive, take a bus or take a taxi by yourself.   No eating 8 hours before your exam. You may have clear liquids up until 4 hours before your exam. (Clear liquids include water, clear tea, black coffee and fruit juice without pulp.)   You will spend four to five hours in the recovery room.  Please call the Imaging Department at your exam site with any questions.            Oct 25, 2017  8:30 AM CDT   (Arrive by 8:15 AM)   In Lab Video Visit with  EEG TECH 2   [facility] ([facility])    [address] [facility]  3rd Floor  [facility] [id number] [phone]            Oct 31, 2017  9:15 AM CDT   Return Visit with Emily Rollins, PT   [facility] ([facility])    [address] [place], [place] [address]  [facility] [address]  [facility] [id number] [phone]            Dec 06, 2017  3:00 PM CST   (Arrive by 2:45 PM)   Return Botox with Frederick Villaseñor  MD [place]   [facility] ([facility])    [address] [facility]  3rd Floor  [facility] [id number] [phone]            Dec 12, 2017  2:45 PM CST   Return Visit with Cata Hurst NP   [facility] ([facility])    [address]  [address]  [facility] [phone]            Jan 17, 2018  2:20 PM CST   (Arrive by 2:05 PM)   New Patient Visit with Estela Cuenca MD   [facility] ([facility])    [address] [facility]  4th Floor  [facility] [address] [phone]            Jan 24, 2018  2:00 PM CST   (Arrive by 1:45 PM)   Return Visit with EVI Vizcaino CNP   [facility] ([facility])    [address] [facility]  4th [facility] [address] [phone]              Who to contact     If you have questions or need follow up information about today's clinic visit or your schedule please contact [facility] directly at [phone].  Normal or non-critical lab and imaging results will be communicated to you by [employer], letter or phone within 4 business days after the clinic has received the results. If you do not hear from us within 7 days, please contact the clinic through [employer] or phone. If you have a critical or abnormal lab result, we will notify you by phone as soon as possible.  Submit refill requests through [facility] or call your pharmacy and they will forward the refill request to us. Please allow 3 business days for your refill to be completed.          Additional Information About Your Visit        [facility] Information     [facility] gives you secure access to your electronic health record. If you see a primary care provider, you can also send messages to your care team and make appointments. If you have questions, please call your primary care  clinic.  If you do not have a primary care provider, please call [phone] and they will assist you.        Care EveryWhere ID     This is your Care EveryWhere ID. This could be used by other organizations to access your [facility] medical records  [id number]        Your Vitals Were     Pulse Temperature Last Period Pulse Oximetry          107 97.8  F (36.6  C) (Oral) 09/28/2017 (Exact Date) 99%         Blood Pressure from Last 3 Encounters:   10/17/17 114/80   10/17/17 134/83   10/13/17 132/88    Weight from Last 3 Encounters:   10/17/17 149 lb (67.6 kg)   10/13/17 145 lb (65.8 kg)   10/11/17 146 lb (66.2 kg)              Today, you had the following     No orders found for display       Primary Care Provider Office Phone # Fax #    Sonja EVI Laguerre [facility] [phone]       [address]  [facility] [address]        Equal Access to Services     FRANK GLAEANO : Hadii aad ku hadasho Soomaali, waaxda luqadaha, qaybta kaalmada adeegyada, waxay idiin hayaan maddie kharachante rodriguez . So [facility] [phone].    ATENCIÓN: Si habla español, tiene a livingston disposición servicios gratuitos de asistencia lingüística. [facility] [phone].    We comply with applicable federal civil rights laws and [place] laws. We do not discriminate on the basis of race, color, national origin, age, disability, sex, sexual orientation, or gender identity.            Thank you!     Thank you for choosing [facility]  for your care. Our goal is always to provide you with excellent care. Hearing back from our patients is one way we can continue to improve our services. Please take a few minutes to complete the written survey that you may receive in the mail after your visit with us. Thank you!             Your Updated Medication List - Protect others around you: Learn how to safely use, store and throw away your medicines at www.disposemymeds.org.          This list is accurate as of: 10/17/17 11:30 AM.   Always use your most recent med list.                   Brand Name Dispense Instructions for use Diagnosis    albuterol 108 (90 BASE) MCG/ACT Inhaler    PROAIR HFA/PROVENTIL HFA/VENTOLIN HFA    1 Inhaler    Inhale 2 puffs into the lungs every 6 hours as needed for shortness of breath / dyspnea or wheezing    Atypical chest pain       amitriptyline 50 MG tablet    ELAVIL    30 tablet    Take 1 tablet (50 mg) by mouth At Bedtime    Abdominal pain, generalized, Insomnia due to medical condition       apremilast 30 MG tablet    OTEZLA    60 tablet    20 mg in AM and 30mg in PM daily X 2 weeks and then 30mg twice daily.    Behcet's disease (H)       ASPIRIN CHILDRENS 81 MG chewable tablet   Generic drug:  aspirin      Take 81 mg by mouth as needed        betamethasone valerate 0.1 % cream    VALISONE     Apply topically 2 times daily        * botulinum toxin type A 100 UNITS injection    BOTOX    200 Units    Inject 200 Units into the muscle every 3 months    Cervical dystonia       * ONABOTULINUMTOXINA IJ      Inject 165 Units as directed once Lot# /[id number] Expiration: 04/2020        carbamide peroxide 6.5 % otic solution    DEBROX     5 drops 2 times daily        clobetasol 0.05 % cream    TEMOVATE    60 g    Apply topically 2 times daily    Folliculitis       Colchicine 0.6 MG Caps     90 capsule    Take 0.6 mg by mouth See Admin Instructions 2 capsules in AM and 1 capsule in PM    Behcet's syndrome (H)       diclofenac 1 % Gel topical gel    VOLTAREN    100 g    Apply 2-4 grams to affected area(s) up to 4 times per day as needed. This is an anti-inflammatory medication.    Polyarthralgia       dicyclomine 20 MG tablet    BENTYL    60 tablet    Take 1 tablet (20 mg) by mouth 4 times daily as needed    Abdominal cramping       diphenhydrAMINE 25 MG tablet    BENADRYL    30 tablet    Take 1 tablet (25 mg) by mouth every 8 hours as needed for allergies        EPINEPHrine 0.3 MG/0.3ML injection 2-pack    EPIPEN 2-EAMON     0.6 mL    Inject 0.3 mLs (0.3 mg) into the muscle as needed for anaphylaxis    Hx of bee sting allergy       EXCEDRIN MIGRAINE PO      Take by mouth as needed        guanFACINE 1 MG tablet    TENEX    180 tablet    Take 3 tablets (3 mg) by mouth twice daily morning and evening.    Tic       hydrOXYzine 25 MG tablet    ATARAX    60 tablet    Take 1-2 tablets (25-50 mg) by mouth every 6 hours as needed for anxiety    TAHIR (generalized anxiety disorder)       hyoscyamine 0.125 MG tablet    ANASPAZ/LEVSIN    40 tablet    Take 1-2 tablets (125-250 mcg) by mouth every 4 hours as needed for cramping    Abdominal pain, generalized       hypromellose 0.3 % Soln ophthalmic solution    GENTEAL     1 drop every hour as needed        LACTAID PO      Take by mouth daily        lactulose 20 GM/30ML Soln     300 mL    Take 30 mLs by mouth 3 times daily as needed    Constipation, unspecified constipation type       lidocaine 2 % topical gel    XYLOCAINE     Apply 1 Tube topically daily Reported on 4/21/2017        lidocaine visc 2% 2.5mL/5mL & maalox/mylanta w/ simeth 2.5mL/5mL & diphenhydrAMINE 5mg/5mL Susp suspension    [facility]    1 Bottle    Swish and swallow 10 mLs in mouth every 6 hours as needed for mouth sores    Behcet's syndrome (H)       linaclotide 290 MCG capsule    LINZESS    90 capsule    Take 1 capsule (290 mcg) by mouth every morning (before breakfast)    Irritable bowel syndrome       LORazepam 1 MG tablet    ATIVAN    60 tablet    Take 0.5 tablets (0.5 mg) by mouth 2 times daily as needed for other (atypical chest pain) Do not operate a vehicle after taking this medication    Chronic abdominal pain       meclizine 25 MG tablet    ANTIVERT    30 tablet    Take 1 tablet (25 mg) by mouth every 6 hours as needed for dizziness    Nausea       norgestimate-ethinyl estradiol 0.25-35 MG-MCG per tablet    ORTHO-CYCLEN, SPRINTEC    84 tablet    Take 1 tablet by mouth daily    General counseling for  prescription of oral contraceptives       omeprazole 40 MG capsule    priLOSEC    90 capsule    Take 1 capsule (40 mg) by mouth daily    Gastroesophageal reflux disease, esophagitis presence not specified       ondansetron 8 MG ODT tab    ZOFRAN-ODT    60 tablet    Take 1 tablet (8 mg) by mouth every 8 hours as needed for nausea    Non-intractable vomiting with nausea, unspecified vomiting type, Hematemesis, presence of nausea not specified       promethazine 25 MG tablet    PHENERGAN    20 tablet    Take 0.5-1 tablets (12.5-25 mg) by mouth every 6 hours as needed for nausea    Intractable chronic migraine without aura and without status migrainosus       sucralfate 1 GM/10ML suspension    CARAFATE    1200 mL    Take 10 mLs (1 g) by mouth 4 times daily    Bile reflux gastritis, Nausea       triamcinolone 0.1 % cream    KENALOG    15 g    Apply sparingly to oral ulcers three times daily for 14 days as needed.    Behcet's syndrome (H)       * Notice:  This list has 2 medication(s) that are the same as other medications prescribed for you. Read the directions carefully, and ask your doctor or other care provider to review them with you.

## 2017-10-17 NOTE — PROGRESS NOTES
SUBJECTIVE:   Samara Oropeza is a 22 year old female who presents to clinic today for the following health issues:        Anxiety Follow-Up    Status since last visit: No change    Other associated symptoms: ongoing pain, seizure episodes    Complicating factors:   Significant life event: Yes-  Worsening health, hoping to move into new apartment with Avi (boyfriend) by the end of the month   Current substance abuse: None  Depression symptoms: No  TAHIR-7 SCORE 8/1/2017 8/16/2017 10/9/2017   Total Score - - -   Total Score - - 19 (severe anxiety)   Total Score 18 17 19       GAD7         Has started seeing therapist through Pain clinic, which she feels has been helpful. Off Clonazepam now, and feels like anxiety level is about the same, maybe a little worse due to living situation and new issues with health. Kacie Borne through GI has prescribed ativan for her. Believes she had a bad reaction to Buspar, but doesn't remember what happened. Has only tried taking one of hydroxyzine at a time, finds it a little helpful.    Diagnosed with Tourette's as a child. Tics usually include making a clicking nose with tongue and a gasping noise. Sometimes tics cause her to clench her muscles in her head and neck and throughout her stomach. Botox has helped with clenching symptoms in head and neck, but will still get muscular pain in abdomen after a tic. Notices more tics when she is anxious. Has been at same dose of guanfacine for about 10 years. Tics have been a little worse lately due to anxiety.    Low appetite and irregular eating- has made some improvements lately with eating several small meals during the day rather than skipping meals. Has also started using protein shakes, which don't irritate her belly as much. Has some ongoing constipation issues, but that has been okay lately.        -------------------------------------    Problem list and histories reviewed & adjusted, as indicated.  Additional history: as  documented    Patient Active Problem List   Diagnosis     Tics - Tourette syndrome     IBS (irritable bowel syndrome)     Seasonal allergic rhinitis     TAHIR (generalized anxiety disorder)     Knee pain     Concussion     Milk protein intolerance     Headaches due to old head injury     Behcet's disease (H)     Migraine     Spell of shaking     On colchicine therapy     Palpitations     Fibromyalgia     Rheumatoid arthritis of multiple sites without rheumatoid factor (H)     Raynaud's disease without gangrene     Chronic abdominal pain     Left knee pain     Patellofemoral instability     PTSD (post-traumatic stress disorder)     Cervical dystonia     Acute left ankle pain     Cervical pain     Major depressive disorder, recurrent episode, moderate (H)     Chronic pain syndrome     Convulsions, unspecified convulsion type (H)     Transient alteration of awareness     Vitamin D deficiency     Past Surgical History:   Procedure Laterality Date     ARTHROSCOPY KNEE WITH PATELLAR REALIGNMENT  7/25/2013    Procedure: ARTHROSCOPY KNEE WITH PATELLAR REALIGNMENT;  Left Knee Arthroscopy, Medial Patellofemoral Ligament Reconstruction with Allograft  ;  Surgeon: Jennifer Acevedo MD;  Location: [facility]     COLONOSCOPY  2015     DENTAL SURGERY  1996    Teeth removal     ENDOSCOPY UPPER, COLONOSCOPY, COMBINED  2005     HC ESOPH/GAS REFLUX TEST W NASAL IMPED >1 HR N/A 2/15/2017    Procedure: ESOPHAGEAL IMPEDENCE FUNCTION TEST WITH 24 HOUR PH GREATER THAN 1 HOUR;  Surgeon: Timothy Matta MD;  Location: [facility]       Social History   Substance Use Topics     Smoking status: Never Smoker     Smokeless tobacco: Never Used     Alcohol use 0.6 oz/week     1 Standard drinks or equivalent per week      Comment: MONTHLY     Family History   Problem Relation Age of Onset     Depression Mother      Neurologic Disorder Mother      Migraines, take imitrex injection.  Also in maternal grandmother.       Alcohol/Drug Father       Hypertension Father      Depression Father      Cardiovascular Maternal Grandmother      Depression Maternal Grandmother      Hypertension Maternal Grandmother      Alzheimer Disease Maternal Grandmother      Cardiovascular Maternal Grandfather      Hypertension Maternal Grandfather      Depression Maternal Grandfather      Alcohol/Drug Maternal Grandfather      Cardiovascular Paternal Grandmother      Hypertension Paternal Grandmother      Cardiovascular Paternal Grandfather      Hypertension Paternal Grandfather      Glaucoma No family hx of      Macular Degeneration No family hx of          Current Outpatient Prescriptions   Medication Sig Dispense Refill     amitriptyline (ELAVIL) 50 MG tablet Take 1 tablet (50 mg) by mouth At Bedtime 30 tablet 11     lactulose 20 GM/30ML SOLN Take 30 mLs by mouth 3 times daily as needed 300 mL 3     LORazepam (ATIVAN) 1 MG tablet Take 0.5 tablets (0.5 mg) by mouth 2 times daily as needed for other (atypical chest pain) Do not operate a vehicle after taking this medication 60 tablet 0     hydrOXYzine (ATARAX) 25 MG tablet Take 1-2 tablets (25-50 mg) by mouth every 6 hours as needed for anxiety 60 tablet 1     guanFACINE (TENEX) 1 MG tablet Take 3 tablets (3 mg) by mouth twice daily morning and evening. 180 tablet 0     ondansetron (ZOFRAN-ODT) 8 MG ODT tab Take 1 tablet (8 mg) by mouth every 8 hours as needed for nausea 60 tablet 6     ONABOTULINUMTOXINA IJ Inject 165 Units as directed once Lot# /C3  Expiration: 04/2020       Colchicine 0.6 MG CAPS Take 0.6 mg by mouth See Admin Instructions 2 capsules in AM and 1 capsule in PM 90 capsule 3     diphenhydrAMINE (BENADRYL) 25 MG tablet Take 1 tablet (25 mg) by mouth every 8 hours as needed for allergies 30 tablet 0     linaclotide (LINZESS) 290 MCG capsule Take 1 capsule (290 mcg) by mouth every morning (before breakfast) 90 capsule 1     sucralfate (CARAFATE) 1 GM/10ML suspension Take 10 mLs (1 g) by mouth 4 times daily 1200  mL 2     diclofenac (VOLTAREN) 1 % GEL topical gel Apply 2-4 grams to affected area(s) up to 4 times per day as needed. This is an anti-inflammatory medication. 100 g 4     aspirin (ASPIRIN CHILDRENS) 81 MG chewable tablet Take 81 mg by mouth as needed        dicyclomine (BENTYL) 20 MG tablet Take 1 tablet (20 mg) by mouth 4 times daily as needed 60 tablet 1     omeprazole (PRILOSEC) 40 MG capsule Take 1 capsule (40 mg) by mouth daily 90 capsule 3     EPINEPHrine (EPIPEN 2-EAMON) 0.3 MG/0.3ML injection Inject 0.3 mLs (0.3 mg) into the muscle as needed for anaphylaxis 0.6 mL 3     apremilast (OTEZLA) 30 MG tablet 20 mg in AM and 30mg in PM daily X 2 weeks and then 30mg twice daily. 60 tablet 3     norgestimate-ethinyl estradiol (ORTHO-CYCLEN, SPRINTEC) 0.25-35 MG-MCG per tablet Take 1 tablet by mouth daily 84 tablet 3     albuterol (PROAIR HFA/PROVENTIL HFA/VENTOLIN HFA) 108 (90 BASE) MCG/ACT Inhaler Inhale 2 puffs into the lungs every 6 hours as needed for shortness of breath / dyspnea or wheezing 1 Inhaler 1     promethazine (PHENERGAN) 25 MG tablet Take 0.5-1 tablets (12.5-25 mg) by mouth every 6 hours as needed for nausea 20 tablet 1     meclizine (ANTIVERT) 25 MG tablet Take 1 tablet (25 mg) by mouth every 6 hours as needed for dizziness 30 tablet 1     Magic Mouthwash (FV std formula) lidocaine visc 2% 2.5mL/5mL & maalox/mylanta w/ simeth 2.5mL/5mL & diphenhydrAMINE 5mg/5mL Swish and swallow 10 mLs in mouth every 6 hours as needed for mouth sores 1 Bottle 1     clobetasol (TEMOVATE) 0.05 % cream Apply topically 2 times daily 60 g 0     botulinum toxin type A (BOTOX) 100 UNITS injection Inject 200 Units into the muscle every 3 months 200 Units 3     hyoscyamine (ANASPAZ,LEVSIN) 0.125 MG tablet Take 1-2 tablets (125-250 mcg) by mouth every 4 hours as needed for cramping 40 tablet 1     Aspirin-Acetaminophen-Caffeine (EXCEDRIN MIGRAINE PO) Take by mouth as needed        hypromellose (GENTEAL) 0.3 % SOLN 1 drop every  hour as needed       betamethasone valerate (VALISONE) 0.1 % cream Apply topically 2 times daily       carbamide peroxide (DEBROX) 6.5 % otic solution 5 drops 2 times daily       Lactase (LACTAID PO) Take by mouth daily       lidocaine (XYLOCAINE) 2 % jelly Apply 1 Tube topically daily Reported on 4/21/2017       triamcinolone (KENALOG) 0.1 % cream Apply sparingly to oral ulcers three times daily for 14 days as needed. 15 g 1         Reviewed and updated as needed this visit by clinical staffTobacco  Allergies  Meds  Med Hx  Surg Hx  Fam Hx  Soc Hx      Reviewed and updated as needed this visit by Provider         ROS:  CONSTITUTIONAL:NEGATIVE for fever, chills, change in weight  RESP:NEGATIVE for significant cough or SOB  BREAST: NEGATIVE for masses, tenderness or discharge  GI: negative for new diarrhea or constipation  ENDOCRINE: NEGATIVE for temperature intolerance, skin/hair changes  PSYCHIATRIC: POSITIVE foranxiety and appetite disturbance     OBJECTIVE:     /80  Pulse 107  Temp 97.8  F (36.6  C) (Oral)  LMP 09/28/2017 (Exact Date)  SpO2 99%  There is no height or weight on file to calculate BMI.   GENERAL: healthy, alert and no distress  NECK: no adenopathy, no asymmetry, masses, or scars and thyroid normal to palpation  RESP: lungs clear to auscultation - no rales, rhonchi or wheezes  CV: regular rate and rhythm, normal S1 S2, no S3 or S4, no murmur, click or rub, no peripheral edema and peripheral pulses strong  ABDOMEN: tenderness epigastric and bowel sounds normal  MS: no gross musculoskeletal defects noted, no edema  PSYCH: mentation appears normal, affect normal/bright, judgement and insight intact and appearance well groomed, positive mood    Diagnostic Test Results:  No results found for this or any previous visit (from the past 24 hour(s)).    ASSESSMENT/PLAN:     Problem List Items Addressed This Visit     TAHIR (generalized anxiety disorder) - Primary      Other Visit Diagnoses      Other chronic pain        Relevant Orders    CARE COORDINATION REFERRAL (Completed)    Tourettes syndrome        Other irritable bowel syndrome           Tourettes- sent message to Dr. Flores inquiring if a dose change for Guanfacine may be helpful- he repliued that it would be better to keep current dose while she undergoes neurologic testing- goal is to rule out Neurological Behcets  Will try Social work referral to try to alleviate housing and financial concerns which exacerbate anxiety  Maintain current treatment for irritable bowel/chronic abdominal issues.  Maintain current treatment for anxiety    She mentioned that her current GI provider may be leaving the practice, and she will likely try to follow this provider to her new practice, otherwise may need new GI referral    EVI Cardona [facility]  Please note greater than 50% of this 30 minute appointment were spent in counseling with the patient of the issues described above in the history of present illness and in the plan, including monitoring therapy for multiple chronic conditions

## 2017-10-17 NOTE — PATIENT INSTRUCTIONS
After Visit Instructions:     Thank you for coming to [facility] [place] for your care. It is my goal to partner with you to help you reach your optimal state of health.     I am recommending multidisciplinary care at this time.  The focus of care will be to continue gradual rehabilitation and pain management with medication adjustments as needed.    Continue daily self-care, identifying contributing factors, and monitoring variations in pain level. Continue to integrate self-care into your life.      1. Schedule pain psychology visits with Russell Mayo  2. Schedule physical therapy visits with Emily  3. Schedule follow-up with EVI Lobato NP-C at your convenience. 60 minutes appt.   4. Medication recommendations: No change to current medications.       EVI Miller, NP-C  [facility] [place]    Contact information: [facility]    Please call if any side effects, questions, or concerns arise.    Nurse Triage line:  [phone]   Call this number with any questions or concerns. You may leave a detailed message anytime. Calls are typically returned Monday through Friday between 8 AM and 4:30 PM. We usually get back to you within 2 business days depending on the issue/request.    Scheduling number: [phone].  Call this number to schedule or change appointments.          Medication Refills Policy:    For non-narcotic medications, please your pharmacy directly to request a refill and the pharmacy will call the [facility] for authorization. Please allow 3-4 days for these refills.    For narcotic refills, call the nurse triage line and leave a message requesting your refill along with the name of the pharmacy that you use. Narcotic prescriptions will be mailed to your pharmacy or you may pick them up at the clinic.  Please call 7-10 days before your refill is due  The above policy allows adequate time so that you do not  run out of medication.    No Show - Late Cancellation - Late Arrival Policy  We believe regular attendance is key to your success in our program.    Any time you are unable to keep your appointment we ask that you call us at  least 24 hours in advance to let us know. This will allow us to offer the appointment time to another patient. The following is our policy for missed appointments. This also applies to appointments cancelled with less than 24 hours notice.    You will receive a letter after missing your 1st and 2nd appointments without contacting the clinic before your scheduled appointment time.     After missing 3 appointments without calling first, we will cancel all of your future appointments at [facility].    At that point, you will not be able to resume services unless approved by your care team  We understand that unforseen circumstances arise, however, out of respect for all concerned and to provide this appointment to another patient, this policy will be enforced.    Please note that most follow up appointments are 30 minutes long. If you arrive late, your provider may not be able to see you for the entire 30 minutes. Please also note that if you arrive more than 15 minutes for any appointment, it may be rescheduled.

## 2017-10-17 NOTE — PROGRESS NOTES
"[facility]    CHIEF COMPLAINT: Pain \"whole body\"     INTERVAL HISTORY:  Last seen on 17.        Recommendations/plan at the last visit included:  1. Schedule pain psychology assessment with Dr Joanie Reynaga. Contact information given  2. Schedule physical therapy visits with Emily  3. Schedule follow-up with EVI Lobato NP-C in 4 weeks  4. Medication recommendations:                No changes at this time     Since her last visit, Samara Oropeza reports:  - Having seizures approx two times per month. Has seen Dr Sigifredo Flores in Neurology at [facility], office note reviewed.     Pain Information:   Pain quality: Aching, Exhausting, Numb, Sharp, Shooting, Stabbing and Tiring    Pain timing: Constant     Pain rating: intensity ranges from 5/10 to 10/10, and averages 8/10 on a 0-10 scale.   Aggravating factors include: Nothing noted   Relieving factors include: nothing noted      Annual Controlled Substance Agreement/UDS due date: N/A    Current Pain Relevant Medications:    Excedrin migraine: 1-2 tablets ~ monthly  Buspar 15 mg BID  Hydrozyzine 25-50 mg at HS, Hasn't started yet.   Bentyl: 20 mg About 1-2 times per month  Lorazepam 1 mg varies frequency  Metaxalone 800 mg: Hasn't started yet  Colchicine 0.6 m tabs in morning and 1 at HS  Otezla 30 mg BID  Botox injection every 3 months.   Diclofenac gel: PRN      Previous Pain Relevant Medications: (H--helped; HI--Helped initially; SWH--Somewhat helpful; NH--No help; W--worse; SE--side effects; ?--Unsure if helpful)   NOTE: This medication information taken from patient's intake form, not medical records.                         Opiates: Codeine:SE, fatigue, hydrocodone:SE, nausea/vomiting, morphine: Sedation, oxycodone: Nausea, tramadol: Nausea                        NSAIDS: Ibuprofen:?, Indomethacin: Allergy, naproxen:?                        Muscle Relaxants: Cyclobenzaprine: Sedation                        Anti-migraine " "mediations: Sumatriptan:NH                        Anti-depressants: Amitriptyline:?                         Sleep aids: None                        Anxiolytics: Clonazepam: Sedation, hydroxyzine:?, Lorazepam: H                        Anti-convulsants: Gabapentin: Sedation, pregabalin:NH, topiramate: \"Bad reaction\"                                           Topicals: Lidocaine:NH, diclofenac:NH,                        Other medications not covered above: Tylenol:NH, prednisone:?     Any illicit drug use: Denies  EtOH use: 1-2 times per month  Caffeine use: 1 every other day  Nicotine use: denies, exposed to second hand smoke  Any use of prescriptions other than how they were prescribed:rena     [place] Board of Pharmacy Data Base Reviewed:    YES; No concern for abuse or misuse of controlled medications based on this report.       SELF CARE:   How often do you practice SELF-CARE (relaxing, stretching, pacing, monitoring posture, taking mini-breaks) in a typical day:  5-6 x per day      Medications:  Current Outpatient Prescriptions   Medication Sig Dispense Refill     amitriptyline (ELAVIL) 50 MG tablet Take 1 tablet (50 mg) by mouth At Bedtime 30 tablet 11     lactulose 20 GM/30ML SOLN Take 30 mLs by mouth 3 times daily as needed 300 mL 3     LORazepam (ATIVAN) 1 MG tablet Take 0.5 tablets (0.5 mg) by mouth 2 times daily as needed for other (atypical chest pain) Do not operate a vehicle after taking this medication 60 tablet 0     hydrOXYzine (ATARAX) 25 MG tablet Take 1-2 tablets (25-50 mg) by mouth every 6 hours as needed for anxiety 60 tablet 1     guanFACINE (TENEX) 1 MG tablet Take 3 tablets (3 mg) by mouth twice daily morning and evening. 180 tablet 0     ondansetron (ZOFRAN-ODT) 8 MG ODT tab Take 1 tablet (8 mg) by mouth every 8 hours as needed for nausea 60 tablet 6     ONABOTULINUMTOXINA IJ Inject 165 Units as directed once Lot# /C3  Expiration: 04/2020       Colchicine 0.6 MG CAPS Take 0.6 mg by " mouth See Admin Instructions 2 capsules in AM and 1 capsule in PM 90 capsule 3     diphenhydrAMINE (BENADRYL) 25 MG tablet Take 1 tablet (25 mg) by mouth every 8 hours as needed for allergies 30 tablet 0     linaclotide (LINZESS) 290 MCG capsule Take 1 capsule (290 mcg) by mouth every morning (before breakfast) 90 capsule 1     sucralfate (CARAFATE) 1 GM/10ML suspension Take 10 mLs (1 g) by mouth 4 times daily 1200 mL 2     diclofenac (VOLTAREN) 1 % GEL topical gel Apply 2-4 grams to affected area(s) up to 4 times per day as needed. This is an anti-inflammatory medication. 100 g 4     aspirin (ASPIRIN CHILDRENS) 81 MG chewable tablet Take 81 mg by mouth as needed        dicyclomine (BENTYL) 20 MG tablet Take 1 tablet (20 mg) by mouth 4 times daily as needed 60 tablet 1     omeprazole (PRILOSEC) 40 MG capsule Take 1 capsule (40 mg) by mouth daily 90 capsule 3     EPINEPHrine (EPIPEN 2-EAMON) 0.3 MG/0.3ML injection Inject 0.3 mLs (0.3 mg) into the muscle as needed for anaphylaxis 0.6 mL 3     apremilast (OTEZLA) 30 MG tablet 20 mg in AM and 30mg in PM daily X 2 weeks and then 30mg twice daily. 60 tablet 3     norgestimate-ethinyl estradiol (ORTHO-CYCLEN, SPRINTEC) 0.25-35 MG-MCG per tablet Take 1 tablet by mouth daily 84 tablet 3     albuterol (PROAIR HFA/PROVENTIL HFA/VENTOLIN HFA) 108 (90 BASE) MCG/ACT Inhaler Inhale 2 puffs into the lungs every 6 hours as needed for shortness of breath / dyspnea or wheezing 1 Inhaler 1     promethazine (PHENERGAN) 25 MG tablet Take 0.5-1 tablets (12.5-25 mg) by mouth every 6 hours as needed for nausea 20 tablet 1     meclizine (ANTIVERT) 25 MG tablet Take 1 tablet (25 mg) by mouth every 6 hours as needed for dizziness 30 tablet 1     Magic Mouthwash (FV std formula) lidocaine visc 2% 2.5mL/5mL & maalox/mylanta w/ simeth 2.5mL/5mL & diphenhydrAMINE 5mg/5mL Swish and swallow 10 mLs in mouth every 6 hours as needed for mouth sores 1 Bottle 1     clobetasol (TEMOVATE) 0.05 % cream Apply  topically 2 times daily 60 g 0     botulinum toxin type A (BOTOX) 100 UNITS injection Inject 200 Units into the muscle every 3 months 200 Units 3     hyoscyamine (ANASPAZ,LEVSIN) 0.125 MG tablet Take 1-2 tablets (125-250 mcg) by mouth every 4 hours as needed for cramping 40 tablet 1     Aspirin-Acetaminophen-Caffeine (EXCEDRIN MIGRAINE PO) Take by mouth as needed        hypromellose (GENTEAL) 0.3 % SOLN 1 drop every hour as needed       betamethasone valerate (VALISONE) 0.1 % cream Apply topically 2 times daily       carbamide peroxide (DEBROX) 6.5 % otic solution 5 drops 2 times daily       Lactase (LACTAID PO) Take by mouth daily       lidocaine (XYLOCAINE) 2 % jelly Apply 1 Tube topically daily Reported on 4/21/2017       triamcinolone (KENALOG) 0.1 % cream Apply sparingly to oral ulcers three times daily for 14 days as needed. 15 g 1         PHYSICAL EXAM    Vitals:    10/17/17 0920   BP: 134/83   Pulse: 100   SpO2: 97%   Weight: 67.6 kg (149 lb)     Patient should have been scheduled for 60 min f/u but given 30 min slot and arrived approx 15 minutes late. She will be rescheduled for 60 min f/u to complete new patient assessment and discuss treatment plan.     Plan:  1. Schedule pain psychology visits with Russell Mayo  2. Schedule physical therapy visits with Emily  3. Schedule follow-up with EVI Lobato, NP-C at your convenience. 60  minutes appt.   4. Medication recommendations: No change to current medications.         Total time spent face to face was 10 minutes and more than 50% of face to face time was spent in counseling and/or coordination of care regarding the diagnosis and recommendations above.      EVI Miller, NP-C   [facility]

## 2017-10-18 ENCOUNTER — OFFICE VISIT (OUTPATIENT)
Dept: RHEUMATOLOGY | Facility: CLINIC | Age: 22
End: 2017-10-18
Payer: COMMERCIAL

## 2017-10-18 VITALS
BODY MASS INDEX: 26.39 KG/M2 | OXYGEN SATURATION: 100 % | SYSTOLIC BLOOD PRESSURE: 110 MMHG | DIASTOLIC BLOOD PRESSURE: 80 MMHG | WEIGHT: 149 LBS | HEART RATE: 106 BPM | TEMPERATURE: 98.1 F

## 2017-10-18 DIAGNOSIS — M35.2 BEHCET'S DISEASE (H): Primary | ICD-10-CM

## 2017-10-18 PROCEDURE — 99214 OFFICE O/P EST MOD 30 MIN: CPT | Performed by: INTERNAL MEDICINE

## 2017-10-18 RX ORDER — GUANFACINE 1 MG/1
TABLET ORAL
Qty: 180 TABLET | Refills: 0 | OUTPATIENT
Start: 2017-10-18

## 2017-10-18 ASSESSMENT — ROUTINE ASSESSMENT OF PATIENT INDEX DATA (RAPID3)
RAPID3 INTERPRETATION: HIGH > 12.0
TOTAL RAPID3 SCORE: 22.7

## 2017-10-18 NOTE — MR AVS SNAPSHOT
After Visit Summary   10/18/2017    Samara Oropeza    MRN: [id number]           Patient Information     Date Of Birth          1995        Visit Information        Provider Department      10/18/2017 11:30 AM Austen Marquez MD [facility]         Follow-ups after your visit        Follow-up notes from your care team     Return in about 3 months (around 1/18/2018).      Your next 10 appointments already scheduled     Oct 19, 2017  2:30 PM CDT   Return Visit with Kimo Hudson LP   [facility] ([facility])    [address]  [facility] [address]   [phone]            Oct 23, 2017  3:30 PM CDT   Return Visit with EVI Dahl CNP   [facility] ([facility])    [address]  [facility] [address]   [phone]            Oct 24, 2017 11:00 AM CDT   PHYSICAL with EVI Cardona CNP   [facility]    [address] [place] [address] [address]  [facility] [address] [phone]            Oct 25, 2017  7:00 AM CDT   (Arrive by 6:45 AM)   MR BRAIN W/O CONTRAST with [id number]   [facility] ([facility])    [address]  1st Floor  [facility] [id number] [phone]           Take your medicines as usual, unless your doctor tells you not to. Bring a list of your current medicines to your exam (including vitamins, minerals and over-the-counter drugs). Also bring the results of similar scans you may have had.  Please remove any body piercings and hair extensions before you arrive.  Follow your doctor s orders. If you do not, we may have to postpone your exam.  You will not have contrast for this exam. You do not need to do anything special to prepare.  The MRI machine uses a strong magnet. Please wear clothes without metal (snaps, zippers). A sweatsuit  works well, or we may give you a hospital gown.   **IMPORTANT** THE INSTRUCTIONS BELOW ARE ONLY FOR THOSE PATIENTS WHO HAVE BEEN TOLD THEY WILL RECEIVE SEDATION OR GENERAL ANESTHESIA DURING THEIR MRI PROCEDURE:  IF YOU WILL RECEIVE SEDATION (take medicine to help you relax during your exam):   You must get the medicine from your doctor before you arrive. Bring the medicine to the exam. Do not take it at home.   Arrive one hour early. Bring someone who can take you home after the test. Your medicine will make you sleepy. After the exam, you may not drive, take a bus or take a taxi by yourself.   No eating 8 hours before your exam. You may have clear liquids up until 4 hours before your exam. (Clear liquids include water, clear tea, black coffee and fruit juice without pulp.)  IF YOU WILL RECEIVE ANESTHESIA (be asleep for your exam):   Arrive 1 1/2 hours early. Bring someone who can take you home after the test. You may not drive, take a bus or take a taxi by yourself.   No eating 8 hours before your exam. You may have clear liquids up until 4 hours before your exam. (Clear liquids include water, clear tea, black coffee and fruit juice without pulp.)   You will spend four to five hours in the recovery room.  Please call the Imaging Department at your exam site with any questions.            Oct 25, 2017  8:30 AM CDT   (Arrive by 8:15 AM)   In Lab Video Visit with  EEG TECH 2   [facility] ([facility])    [address] [place] [address]  3rd Floor  [facility] [address] [phone]            Oct 31, 2017  9:15 AM CDT   Return Visit with Emily Rollins PT   [facility] ([facility])    [address]  [facility] [address]  [facility] [address]   [phone]            Dec 06, 2017  3:00 PM CST   (Arrive by 2:45 PM)   Return Botox with Frederick Bender MD   [facility] ([facility])    [address] [place] [address]  [address]  3rd [facility] [address]   [phone]            Dec 12, 2017  2:45 PM CST   Return Visit with Cata Hurst NP   [facility] ([facility])    [address]  [address]  [facility] [address] [phone]            Jan 17, 2018  2:20 PM CST   (Arrive by 2:05 PM)   New Patient Visit with Estela Cuenca MD   [facility] ([facility])    [address] [facility]  4th [facility] [address] [phone]            Jan 24, 2018  2:00 PM CST   (Arrive by 1:45 PM)   Return Visit with EVI Vizcaino CNP   [facility] ([facility])    [address] [place] [id number] [phone]              Who to contact     If you have questions or need follow up information about today's clinic visit or your schedule please contact [facility] directly at [phone].  Normal or non-critical lab and imaging results will be communicated to you by MyChart, letter or phone within 4 business days after the clinic has received the results. If you do not hear from us within 7 days, please contact the clinic through "[employer]hart or phone. If you have a critical or abnormal lab result, we will notify you by phone as soon as possible.  Submit refill requests through [employer] or call your pharmacy and they will forward the refill request to us. Please allow 3 business days for your refill to be completed.          Additional Information About Your Visit        MyChart Information     [employer] gives you secure access to your electronic health record. If you see a primary care provider, you can also send messages to your care team and make appointments. If you have questions, please call your primary care clinic.  If you do not have a primary care provider, please call [phone] and they will assist  you.        Care EveryWhere ID     This is your Care EveryWhere ID. This could be used by other organizations to access your [facility] medical records  [id number]        Your Vitals Were     Pulse Temperature Last Period Pulse Oximetry BMI (Body Mass Index)       106 98.1  F (36.7  C) (Oral) 09/28/2017 (Exact Date) 100% 26.39 kg/m2        Blood Pressure from Last 3 Encounters:   10/18/17 110/80   10/17/17 114/80   10/17/17 134/83    Weight from Last 3 Encounters:   10/18/17 67.6 kg (149 lb)   10/17/17 67.6 kg (149 lb)   10/13/17 65.8 kg (145 lb)              Today, you had the following     No orders found for display       Primary Care Provider Office Phone # Fax #    Sonja EVI [facility] [phone]       [address]  [facility] [address]        Equal Access to Services     NABIL GALEANO : Hadii aad ku hadasho Soomaali, waaxda luqadaha, qaybta kaalmada adeegyada, waxay idiin haysophian maddie rodriguez . So [facility] [phone].    ATENCIÓN: Si habla español, tiene a livingston disposición servicios gratuitos de asistencia lingüística. Llame al [phone].    We comply with applicable federal civil rights laws and [place] laws. We do not discriminate on the basis of race, color, national origin, age, disability, sex, sexual orientation, or gender identity.            Thank you!     Thank you for choosing [facility]  for your care. Our goal is always to provide you with excellent care. Hearing back from our patients is one way we can continue to improve our services. Please take a few minutes to complete the written survey that you may receive in the mail after your visit with us. Thank you!             Your Updated Medication List - Protect others around you: Learn how to safely use, store and throw away your medicines at www.disposemymeds.org.          This list is accurate as of: 10/18/17 12:10 PM.  Always use your most recent med list.                   Brand  Name Dispense Instructions for use Diagnosis    albuterol 108 (90 BASE) MCG/ACT Inhaler    PROAIR HFA/PROVENTIL HFA/VENTOLIN HFA    1 Inhaler    Inhale 2 puffs into the lungs every 6 hours as needed for shortness of breath / dyspnea or wheezing    Atypical chest pain       amitriptyline 50 MG tablet    ELAVIL    30 tablet    Take 1 tablet (50 mg) by mouth At Bedtime    Abdominal pain, generalized, Insomnia due to medical condition       apremilast 30 MG tablet    OTEZLA    60 tablet    20 mg in AM and 30mg in PM daily X 2 weeks and then 30mg twice daily.    Behcet's disease (H)       ASPIRIN CHILDRENS 81 MG chewable tablet   Generic drug:  aspirin      Take 81 mg by mouth as needed        betamethasone valerate 0.1 % cream    VALISONE     Apply topically 2 times daily        * botulinum toxin type A 100 UNITS injection    BOTOX    200 Units    Inject 200 Units into the muscle every 3 months    Cervical dystonia       * ONABOTULINUMTOXINA IJ      Inject 165 Units as directed once Lot# /[id number] Expiration: 04/2020        carbamide peroxide 6.5 % otic solution    DEBROX     5 drops 2 times daily        clobetasol 0.05 % cream    TEMOVATE    60 g    Apply topically 2 times daily    Folliculitis       Colchicine 0.6 MG Caps     90 capsule    Take 0.6 mg by mouth See Admin Instructions 2 capsules in AM and 1 capsule in PM    Behcet's syndrome (H)       diclofenac 1 % Gel topical gel    VOLTAREN    100 g    Apply 2-4 grams to affected area(s) up to 4 times per day as needed. This is an anti-inflammatory medication.    Polyarthralgia       dicyclomine 20 MG tablet    BENTYL    60 tablet    Take 1 tablet (20 mg) by mouth 4 times daily as needed    Abdominal cramping       diphenhydrAMINE 25 MG tablet    BENADRYL    30 tablet    Take 1 tablet (25 mg) by mouth every 8 hours as needed for allergies        EPINEPHrine 0.3 MG/0.3ML injection 2-pack    EPIPEN 2-EAMON    0.6 mL    Inject 0.3 mLs (0.3 mg) into the muscle as needed for  anaphylaxis    Hx of bee sting allergy       EXCEDRIN MIGRAINE PO      Take by mouth as needed        guanFACINE 1 MG tablet    TENEX    180 tablet    Take 3 tablets (3 mg) by mouth twice daily morning and evening.    Tic       hydrOXYzine 25 MG tablet    ATARAX    60 tablet    Take 1-2 tablets (25-50 mg) by mouth every 6 hours as needed for anxiety    TAHIR (generalized anxiety disorder)       hyoscyamine 0.125 MG tablet    ANASPAZ/LEVSIN    40 tablet    Take 1-2 tablets (125-250 mcg) by mouth every 4 hours as needed for cramping    Abdominal pain, generalized       hypromellose 0.3 % Soln ophthalmic solution    GENTEAL     1 drop every hour as needed        LACTAID PO      Take by mouth daily        lactulose 20 GM/30ML Soln     300 mL    Take 30 mLs by mouth 3 times daily as needed    Constipation, unspecified constipation type       lidocaine 2 % topical gel    XYLOCAINE     Apply 1 Tube topically daily Reported on 4/21/2017        lidocaine visc 2% 2.5mL/5mL & maalox/mylanta w/ simeth 2.5mL/5mL & diphenhydrAMINE 5mg/5mL Susp suspension    [facility]    1 Bottle    Swish and swallow 10 mLs in mouth every 6 hours as needed for mouth sores    Behcet's syndrome (H)       linaclotide 290 MCG capsule    LINZESS    90 capsule    Take 1 capsule (290 mcg) by mouth every morning (before breakfast)    Irritable bowel syndrome       LORazepam 1 MG tablet    ATIVAN    60 tablet    Take 0.5 tablets (0.5 mg) by mouth 2 times daily as needed for other (atypical chest pain) Do not operate a vehicle after taking this medication    Chronic abdominal pain       meclizine 25 MG tablet    ANTIVERT    30 tablet    Take 1 tablet (25 mg) by mouth every 6 hours as needed for dizziness    Nausea       norgestimate-ethinyl estradiol 0.25-35 MG-MCG per tablet    ORTHO-CYCLEN, SPRINTEC    84 tablet    Take 1 tablet by mouth daily    General counseling for prescription of oral contraceptives       omeprazole 40 MG capsule     priLOSEC    90 capsule    Take 1 capsule (40 mg) by mouth daily    Gastroesophageal reflux disease, esophagitis presence not specified       ondansetron 8 MG ODT tab    ZOFRAN-ODT    60 tablet    Take 1 tablet (8 mg) by mouth every 8 hours as needed for nausea    Non-intractable vomiting with nausea, unspecified vomiting type, Hematemesis, presence of nausea not specified       promethazine 25 MG tablet    PHENERGAN    20 tablet    Take 0.5-1 tablets (12.5-25 mg) by mouth every 6 hours as needed for nausea    Intractable chronic migraine without aura and without status migrainosus       sucralfate 1 GM/10ML suspension    CARAFATE    1200 mL    Take 10 mLs (1 g) by mouth 4 times daily    Bile reflux gastritis, Nausea       triamcinolone 0.1 % cream    KENALOG    15 g    Apply sparingly to oral ulcers three times daily for 14 days as needed.    Behcet's syndrome (H)       * Notice:  This list has 2 medication(s) that are the same as other medications prescribed for you. Read the directions carefully, and ask your doctor or other care provider to review them with you.

## 2017-10-18 NOTE — NURSING NOTE
"Chief Complaint   Patient presents with     RECHECK       Initial /80 (BP Location: Left arm, Patient Position: Chair, Cuff Size: Adult Regular)  Pulse 106  Temp 98.1  F (36.7  C) (Oral)  Wt 67.6 kg (149 lb)  LMP 09/28/2017 (Exact Date)  SpO2 100%  BMI 26.39 kg/m2 Estimated body mass index is 26.39 kg/(m^2) as calculated from the following:    Height as of 10/13/17: 1.6 m (5' 3\").    Weight as of this encounter: 67.6 kg (149 lb).  Medication Reconciliation: complete    Have you ever seen a rheumatologist Yes Who You When 9/19/17  Joint pain history  Onset: pt states that she is doing better than last time with her behcet's. Today has severe stomach pains, states that she is constipated. Pt had a seizure 2 days ago. Does have a metallic taste in her mouth  Involved joints: hands , neck, shoulders  Pain scale:  9/10     Wakes the patient from sleep : Yes  Morning stiffness:Yes for 120 minutes  Meds used:cochisine , otezla    Interim history  Since last visit:  1. Infections - No  2. New symptoms/medical problem - Yes/ the cartilage in her chest is inflamed  3. Any side effects from Rheum medications -nausea from the otezla  3. ER visits/Hospitalizations/surgeries - No  4. Last PCP visit: yesterday  Wt Readings from Last 4 Encounters:   10/18/17 67.6 kg (149 lb)   10/17/17 67.6 kg (149 lb)   10/13/17 65.8 kg (145 lb)   10/11/17 66.2 kg (146 lb)     BP Readings from Last 3 Encounters:   10/18/17 110/80   10/17/17 114/80   10/17/17 134/83       "

## 2017-10-19 ENCOUNTER — OFFICE VISIT (OUTPATIENT)
Dept: PALLIATIVE MEDICINE | Facility: CLINIC | Age: 22
End: 2017-10-19
Payer: COMMERCIAL

## 2017-10-19 DIAGNOSIS — G89.4 CHRONIC PAIN SYNDROME: Primary | ICD-10-CM

## 2017-10-19 PROCEDURE — 96152 HC HEALTH AND BEHAVIOR INTERVENTION, INDIVIDUAL, EACH 15 MINUTES: CPT | Performed by: PSYCHOLOGIST

## 2017-10-19 NOTE — PROGRESS NOTES
"                                  [facility]   [facility] [place]  Behavioral Medicine Visit    Patient Name: Samara Oropeza    YOB: 1995   Medical Record Number: [id number]  Date: 10/19/2017                SUBJECTIVE: Met with the patient on this date for an elective return appointment. Today is our fifth visit post initial assessment. Today the patient reports the following: Her pain is moderately worse, her mood is mildly improved, her activity level is mildly decreased, her stress level has been moderately worse and her sleep has been mildly improved. Overall she reports slightly improved since beginning her involvement with pain services.    Today the patient reports her stressors have increased since our last meeting. The reasons for her stress and include moving from her apartment and temporarily having to live with her partner's parents. She also has a pending contact logical exam and she is anxious about pain with her Behcet's lesions but also use of the speculum and the pain associated with swabbing.    The patient remains highly focused on her various medical problems. While it is clear that she has many legitimate concerns her level of focus on being ill is pronounced. Today the patient discusses more of her negative feelings about family members who accused her of \"faking\" her illnesses or her symptoms. She reports for example that she had a seizure for the first time in front of her stepfather and he \"finally became aware that I am not making this up\".    The patient is pleasant and cooperative in our visits. It is unclear if she is getting much benefit in terms of pain reduction. She does appear to be strengthening skills that we have discussed in previous visits to this point.            OBJECTIVE:   Length of Visit: 60 minutes      Assessment: Current Emotional / Mental Status    Appearance:   Appropriate   Eye Contact:   Good "   Psychomotor Behavior: Normal   Attitude:   Cooperative   Orientation:   All  Speech  Rate / Production:             Hyperverbal   Volume:              Normal   Mood:    Anxious   Affect:    Appropriate , Flat  Thought Content:  Clear   Thought Form:  Coherent  Logical   Insight:    Fair     ASSESSMENT:   Axis I: Chronic pain syndrome     Progress toward goals: fair.    Pain Status: worsened    Emotional Status: improved              Medication / chemical use concerns:  None     PLAN:   Next Appointment: Samara Oropeza will schedule a follow-up appointment in  t2 weeks.  Assignment:  Use skills regularly  Objectives / interventions for next session:  Patient reports she feels as though it would be helpful for her to learn specifics about diaphragmatic breathing and additional tools for anxiety coping     Kimo Hudson MA, LP, [facility]  Behavioral Pain Specialist  [facility]

## 2017-10-19 NOTE — MR AVS SNAPSHOT
After Visit Summary   10/19/2017    Samara Oropeza    MRN: [id number]           Patient Information     Date Of Birth          1995        Visit Information        Provider Department      10/19/2017 2:30 PM Kimo Hudson LP [facility]        Today's Diagnoses     Chronic pain syndrome    -  1       Follow-ups after your visit        Your next 10 appointments already scheduled     Oct 23, 2017  3:30 PM CDT   Return Visit with EVI Dahl CNP   [facility] ([facility])    [address] [place], [place] [address]  [facility] [address]  [facility] [address] [phone]            Oct 24, 2017 11:00 AM CDT   PHYSICAL with EVI Cardona CNP   [facility] ([facility])    [address] [place] [address]  [address]  [facility] [id number] [phone]            Oct 25, 2017  7:00 AM CDT   (Arrive by 6:45 AM)   MR BRAIN W/O CONTRAST with [id number]   [facility] ([facility])    [address]  1st Floor  [facility] [id number] [phone]           Take your medicines as usual, unless your doctor tells you not to. Bring a list of your current medicines to your exam (including vitamins, minerals and over-the-counter drugs). Also bring the results of similar scans you may have had.  Please remove any body piercings and hair extensions before you arrive.  Follow your doctor s orders. If you do not, we may have to postpone your exam.  You will not have contrast for this exam. You do not need to do anything special to prepare.  The MRI machine uses a strong magnet. Please wear clothes without metal (snaps, zippers). A sweatsuit works well, or we may give you a hospital gown.   **IMPORTANT** THE INSTRUCTIONS BELOW ARE ONLY FOR THOSE PATIENTS WHO HAVE BEEN TOLD THEY WILL RECEIVE SEDATION OR GENERAL ANESTHESIA DURING THEIR MRI PROCEDURE:  IF YOU WILL RECEIVE SEDATION (take  medicine to help you relax during your exam):   You must get the medicine from your doctor before you arrive. Bring the medicine to the exam. Do not take it at home.   Arrive one hour early. Bring someone who can take you home after the test. Your medicine will make you sleepy. After the exam, you may not drive, take a bus or take a taxi by yourself.   No eating 8 hours before your exam. You may have clear liquids up until 4 hours before your exam. (Clear liquids include water, clear tea, black coffee and fruit juice without pulp.)  IF YOU WILL RECEIVE ANESTHESIA (be asleep for your exam):   Arrive 1 1/2 hours early. Bring someone who can take you home after the test. You may not drive, take a bus or take a taxi by yourself.   No eating 8 hours before your exam. You may have clear liquids up until 4 hours before your exam. (Clear liquids include water, clear tea, black coffee and fruit juice without pulp.)   You will spend four to five hours in the recovery room.  Please call the Imaging Department at your exam site with any questions.            Oct 25, 2017  8:30 AM CDT   (Arrive by 8:15 AM)   In Lab Video Visit with  EEG TECH 2   [facility] ([facility] [place])    [address]  [address]rd [facility] [address] [phone]            Oct 31, 2017  9:15 AM CDT   Return Visit with Emily Rollins, PT   [facility]    [address]  [facility] [address]   [phone]            Nov 02, 2017  2:30 PM CDT   Return Visit with Kimo Hudson LP   [facility] ([facility])    [address]  [facility] [address]   [phone]            Nov 16, 2017  2:30 PM CST   Return Visit with Kimo Hudson LP   [facility] ([facility])    [address]  [facility] [address]   [phone]            Nov 21, 2017 11:00 AM CST    Return Visit with Emily Rollins PT   [facility] ([facility])    [address]  [facility] [address] [phone]            Dec 06, 2017  3:00 PM CST   (Arrive by 2:45 PM)   Return Botox with Frederick Bender MD   [facility] ([facility] [place])    [address]  3rd Floor  [facility] [address] [phone]            Dec 12, 2017  2:45 PM CST   Return Visit with Cata Hurst NP   [facility] ([facility])    [address]  [address]  [facility] [address] [phone]              Who to contact     If you have questions or need follow up information about today's clinic visit or your schedule please contact [facility] directly at [phone].  Normal or non-critical lab and imaging results will be communicated to you by [employer], letter or phone within 4 business days after the clinic has received the results. If you do not hear from us within 7 days, please contact the clinic through [employer] or phone. If you have a critical or abnormal lab result, we will notify you by phone as soon as possible.  Submit refill requests through [employer] or call your pharmacy and they will forward the refill request to us. Please allow 3 business days for your refill to be completed.          Additional Information About Your Visit        [employer] Information     [employer] gives you secure access to your electronic health record. If you see a primary care provider, you can also send messages to your care team and make appointments. If you have questions, please call your primary care clinic.  If you do not have a primary care provider, please call [phone] and they will assist you.        Care EveryWhere ID     This is your Care EveryWhere ID. This could be used by other organizations to access your [facility]  records  [id number]        Your Vitals Were     Last Period                   09/28/2017 (Exact Date)            Blood Pressure from Last 3 Encounters:   10/18/17 110/80   10/17/17 114/80   10/17/17 134/83    Weight from Last 3 Encounters:   10/18/17 67.6 kg (149 lb)   10/17/17 67.6 kg (149 lb)   10/13/17 65.8 kg (145 lb)              We Performed the Following     HEALTH & BEHAVIOR ASSESS, INITIAL, EA 15MIN PHD        Primary Care Provider Office Phone # Fax #    Sonja Abreu, APRN [phone]       [address] [facility] [address]  [facility] [address]        Equal Access to Services     NABIL GALEANO : Hadii aad ku hadasho Sotiffany, waaxda luqadaha, qaybta kaalmada adeegyada, mike rodriguez . So [facility] [phone].    ATENCIÓN: Si habla español, tiene a livingston disposición servicios gratuitos de asistencia lingüística. Llame al [phone].    We comply with applicable federal civil rights laws and [place] laws. We do not discriminate on the basis of race, color, national origin, age, disability, sex, sexual orientation, or gender identity.            Thank you!     Thank you for choosing [facility]  for your care. Our goal is always to provide you with excellent care. Hearing back from our patients is one way we can continue to improve our services. Please take a few minutes to complete the written survey that you may receive in the mail after your visit with us. Thank you!             Your Updated Medication List - Protect others around you: Learn how to safely use, store and throw away your medicines at www.disposemymeds.org.          This list is accurate as of: 10/19/17 11:59 PM.  Always use your most recent med list.                   Brand Name Dispense Instructions for use Diagnosis    albuterol 108 (90 BASE) MCG/ACT Inhaler    PROAIR HFA/PROVENTIL HFA/VENTOLIN HFA    1 Inhaler    Inhale 2 puffs into the lungs every 6 hours as needed for shortness  of breath / dyspnea or wheezing    Atypical chest pain       amitriptyline 50 MG tablet    ELAVIL    30 tablet    Take 1 tablet (50 mg) by mouth At Bedtime    Abdominal pain, generalized, Insomnia due to medical condition       apremilast 30 MG tablet    OTEZLA    60 tablet    20 mg in AM and 30mg in PM daily X 2 weeks and then 30mg twice daily.    Behcet's disease (H)       ASPIRIN CHILDRENS 81 MG chewable tablet   Generic drug:  aspirin      Take 81 mg by mouth as needed        betamethasone valerate 0.1 % cream    VALISONE     Apply topically 2 times daily        * botulinum toxin type A 100 UNITS injection    BOTOX    200 Units    Inject 200 Units into the muscle every 3 months    Cervical dystonia       * ONABOTULINUMTOXINA IJ      Inject 165 Units as directed once Lot# /[id number] Expiration: 04/2020        carbamide peroxide 6.5 % otic solution    DEBROX     5 drops 2 times daily        clobetasol 0.05 % cream    TEMOVATE    60 g    Apply topically 2 times daily    Folliculitis       Colchicine 0.6 MG Caps     90 capsule    Take 0.6 mg by mouth See Admin Instructions 2 capsules in AM and 1 capsule in PM    Behcet's syndrome (H)       diclofenac 1 % Gel topical gel    VOLTAREN    100 g    Apply 2-4 grams to affected area(s) up to 4 times per day as needed. This is an anti-inflammatory medication.    Polyarthralgia       dicyclomine 20 MG tablet    BENTYL    60 tablet    Take 1 tablet (20 mg) by mouth 4 times daily as needed    Abdominal cramping       diphenhydrAMINE 25 MG tablet    BENADRYL    30 tablet    Take 1 tablet (25 mg) by mouth every 8 hours as needed for allergies        EPINEPHrine 0.3 MG/0.3ML injection 2-pack    EPIPEN 2-EAMON    0.6 mL    Inject 0.3 mLs (0.3 mg) into the muscle as needed for anaphylaxis    Hx of bee sting allergy       EXCEDRIN MIGRAINE PO      Take by mouth as needed        guanFACINE 1 MG tablet    TENEX    180 tablet    Take 3 tablets (3 mg) by mouth twice daily morning and  evening.    Tic       hydrOXYzine 25 MG tablet    ATARAX    60 tablet    Take 1-2 tablets (25-50 mg) by mouth every 6 hours as needed for anxiety    TAHIR (generalized anxiety disorder)       hyoscyamine 0.125 MG tablet    ANASPAZ/LEVSIN    40 tablet    Take 1-2 tablets (125-250 mcg) by mouth every 4 hours as needed for cramping    Abdominal pain, generalized       hypromellose 0.3 % Soln ophthalmic solution    GENTEAL     1 drop every hour as needed        LACTAID PO      Take by mouth daily        lactulose 20 GM/30ML Soln     300 mL    Take 30 mLs by mouth 3 times daily as needed    Constipation, unspecified constipation type       lidocaine 2 % topical gel    XYLOCAINE     Apply 1 Tube topically daily Reported on 4/21/2017        lidocaine visc 2% 2.5mL/5mL & maalox/mylanta w/ simeth 2.5mL/5mL & diphenhydrAMINE 5mg/5mL Susp suspension    [facility] Mouthwash [facility]    1 Bottle    Swish and swallow 10 mLs in mouth every 6 hours as needed for mouth sores    Behcet's syndrome (H)       linaclotide 290 MCG capsule    LINZESS    90 capsule    Take 1 capsule (290 mcg) by mouth every morning (before breakfast)    Irritable bowel syndrome       LORazepam 1 MG tablet    ATIVAN    60 tablet    Take 0.5 tablets (0.5 mg) by mouth 2 times daily as needed for other (atypical chest pain) Do not operate a vehicle after taking this medication    Chronic abdominal pain       meclizine 25 MG tablet    ANTIVERT    30 tablet    Take 1 tablet (25 mg) by mouth every 6 hours as needed for dizziness    Nausea       norgestimate-ethinyl estradiol 0.25-35 MG-MCG per tablet    ORTHO-CYCLEN, SPRINTEC    84 tablet    Take 1 tablet by mouth daily    General counseling for prescription of oral contraceptives       omeprazole 40 MG capsule    priLOSEC    90 capsule    Take 1 capsule (40 mg) by mouth daily    Gastroesophageal reflux disease, esophagitis presence not specified       ondansetron 8 MG ODT tab    ZOFRAN-ODT    60 tablet    Take 1 tablet  (8 mg) by mouth every 8 hours as needed for nausea    Non-intractable vomiting with nausea, unspecified vomiting type, Hematemesis, presence of nausea not specified       promethazine 25 MG tablet    PHENERGAN    20 tablet    Take 0.5-1 tablets (12.5-25 mg) by mouth every 6 hours as needed for nausea    Intractable chronic migraine without aura and without status migrainosus       sucralfate 1 GM/10ML suspension    CARAFATE    1200 mL    Take 10 mLs (1 g) by mouth 4 times daily    Bile reflux gastritis, Nausea       triamcinolone 0.1 % cream    KENALOG    15 g    Apply sparingly to oral ulcers three times daily for 14 days as needed.    Behcet's syndrome (H)       * Notice:  This list has 2 medication(s) that are the same as other medications prescribed for you. Read the directions carefully, and ask your doctor or other care provider to review them with you.

## 2017-10-23 ENCOUNTER — OFFICE VISIT (OUTPATIENT)
Dept: PALLIATIVE MEDICINE | Facility: CLINIC | Age: 22
End: 2017-10-23
Payer: COMMERCIAL

## 2017-10-23 VITALS
BODY MASS INDEX: 26.22 KG/M2 | DIASTOLIC BLOOD PRESSURE: 85 MMHG | RESPIRATION RATE: 14 BRPM | WEIGHT: 148 LBS | OXYGEN SATURATION: 100 % | SYSTOLIC BLOOD PRESSURE: 129 MMHG

## 2017-10-23 DIAGNOSIS — F32.9 REACTIVE DEPRESSION: Primary | ICD-10-CM

## 2017-10-23 DIAGNOSIS — M79.7 FIBROMYALGIA: ICD-10-CM

## 2017-10-23 PROCEDURE — 99214 OFFICE O/P EST MOD 30 MIN: CPT | Performed by: NURSE PRACTITIONER

## 2017-10-23 ASSESSMENT — PAIN SCALES - GENERAL: PAINLEVEL: SEVERE PAIN (6)
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
## 2017-10-23 NOTE — PATIENT INSTRUCTIONS
After Visit Instructions:     Thank you for coming to [facility] for your care. It is my goal to partner with you to help you reach your optimal state of health.     I am recommending multidisciplinary care at this time.  The focus of care will be to continue gradual rehabilitation and pain management with medication adjustments as needed.    Continue daily self-care, identifying contributing factors, and monitoring variations in pain level. Continue to integrate self-care into your life.     1. Pain psychology visits as scheduled  2. Physical therapy visit as scheduled   3. Schedule follow-up with EVI Lobato NP-C in 4 week after starting Naltrexone  4. Referrals: Behavioral Health referral, they will call you to schedule.   5. Medication recommendations:   1. Low dose naltrexone 3.5 mg: take one capsule in the morning.     EVI Miller NP-C  [facility]    Contact information: [facility]    Please call if any side effects, questions, or concerns arise.    Nurse Triage line:  [phone]   Call this number with any questions or concerns. You may leave a detailed message anytime. Calls are typically returned Monday through Friday between 8 AM and 4:30 PM. We usually get back to you within 2 business days depending on the issue/request.    Scheduling number: [phone].  Call this number to schedule or change appointments.          Medication Refills Policy:    For non-narcotic medications, please your pharmacy directly to request a refill and the pharmacy will call the [facility] Management Center for authorization. Please allow 3-4 days for these refills.    For narcotic refills, call the nurse triage line and leave a message requesting your refill along with the name of the pharmacy that you use. Narcotic prescriptions will be mailed to your pharmacy or you may pick them up at the clinic.  Please call 7-10 days before  your refill is due  The above policy allows adequate time so that you do not run out of medication.    No Show - Late Cancellation - Late Arrival Policy  We believe regular attendance is key to your success in our program.    Any time you are unable to keep your appointment we ask that you call us at  least 24 hours in advance to let us know. This will allow us to offer the appointment time to another patient. The following is our policy for missed appointments. This also applies to appointments cancelled with less than 24 hours notice.    You will receive a letter after missing your 1st and 2nd appointments without contacting the clinic before your scheduled appointment time.     After missing 3 appointments without calling first, we will cancel all of your future appointments at [facility].    At that point, you will not be able to resume services unless approved by your care team  We understand that unforseen circumstances arise, however, out of respect for all concerned and to provide this appointment to another patient, this policy will be enforced.    Please note that most follow up appointments are 30 minutes long. If you arrive late, your provider may not be able to see you for the entire 30 minutes. Please also note that if you arrive more than 15 minutes for any appointment, it may be rescheduled.
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
## 2017-10-24 ENCOUNTER — OFFICE VISIT (OUTPATIENT)
Dept: FAMILY MEDICINE | Facility: CLINIC | Age: 22
End: 2017-10-24
Payer: COMMERCIAL

## 2017-10-24 VITALS
BODY MASS INDEX: 26.4 KG/M2 | OXYGEN SATURATION: 99 % | TEMPERATURE: 98.8 F | SYSTOLIC BLOOD PRESSURE: 152 MMHG | DIASTOLIC BLOOD PRESSURE: 90 MMHG | HEIGHT: 63 IN | WEIGHT: 149 LBS | HEART RATE: 144 BPM | RESPIRATION RATE: 16 BRPM

## 2017-10-24 DIAGNOSIS — Z11.3 ROUTINE SCREENING FOR STI (SEXUALLY TRANSMITTED INFECTION): ICD-10-CM

## 2017-10-24 DIAGNOSIS — Z12.4 CERVICAL CANCER SCREENING: ICD-10-CM

## 2017-10-24 DIAGNOSIS — Z23 NEED FOR PROPHYLACTIC VACCINATION AND INOCULATION AGAINST INFLUENZA: ICD-10-CM

## 2017-10-24 DIAGNOSIS — Z23 ENCOUNTER FOR IMMUNIZATION: ICD-10-CM

## 2017-10-24 DIAGNOSIS — N94.10 DYSPAREUNIA IN FEMALE: ICD-10-CM

## 2017-10-24 DIAGNOSIS — M35.2 BEHCET'S DISEASE (H): Primary | ICD-10-CM

## 2017-10-24 PROCEDURE — 99213 OFFICE O/P EST LOW 20 MIN: CPT | Mod: 25 | Performed by: NURSE PRACTITIONER

## 2017-10-24 PROCEDURE — 87591 N.GONORRHOEAE DNA AMP PROB: CPT | Performed by: NURSE PRACTITIONER

## 2017-10-24 PROCEDURE — 90472 IMMUNIZATION ADMIN EACH ADD: CPT | Performed by: NURSE PRACTITIONER

## 2017-10-24 PROCEDURE — G0145 SCR C/V CYTO,THINLAYER,RESCR: HCPCS | Performed by: NURSE PRACTITIONER

## 2017-10-24 PROCEDURE — 90686 IIV4 VACC NO PRSV 0.5 ML IM: CPT | Performed by: NURSE PRACTITIONER

## 2017-10-24 PROCEDURE — 87491 CHLMYD TRACH DNA AMP PROBE: CPT | Performed by: NURSE PRACTITIONER

## 2017-10-24 PROCEDURE — 90471 IMMUNIZATION ADMIN: CPT | Performed by: NURSE PRACTITIONER

## 2017-10-24 PROCEDURE — 90715 TDAP VACCINE 7 YRS/> IM: CPT | Performed by: NURSE PRACTITIONER

## 2017-10-24 NOTE — NURSING NOTE
"Chief Complaint   Patient presents with     Physical       Initial /90 (BP Location: Left arm)  Pulse 144  Temp 98.8  F (37.1  C) (Oral)  Resp 16  Ht 5' 3\" (1.6 m)  Wt 149 lb (67.6 kg)  LMP 09/28/2017 (Exact Date)  SpO2 99%  BMI 26.39 kg/m2 Estimated body mass index is 26.39 kg/(m^2) as calculated from the following:    Height as of this encounter: 5' 3\" (1.6 m).    Weight as of this encounter: 149 lb (67.6 kg).  Medication Reconciliation: complete     Mari Contreras CMA      "

## 2017-10-24 NOTE — LETTER
My Migraine Action Plan      Date: 10/24/2017     My Name: Samara Oropeza   YOB: 1995  My Pharmacy:    [facility] - [place], [place] - [address] [facility] - [place], [place] - [address]       My (Preventative) Control Medicine: promethazine (PHENERGAN)        My Rescue Medicine: Aspirin-Acetaminophen-Caffeine    My Doctor: Sonja Abreu     My Clinic: [address] [address] [phone]        GREEN ZONE = Good Control    My headache plan is working.   I can do what I need to do.           I WILL:     ? Keep managing my triggers.  ? Write in my migraine diary each time I have a headache.  ? Keep taking my preventive (controller) medicine daily.  ? Take my relief and rescue medicine as needed.             YELLOW ZONE = Not Enough Control    My headache plan isn t always working.   My headaches keep me from doing   some of the things I need to do.       I WILL:     ? Set goals to control my triggers and act on them.  ? Write in my migraine diary each time I have a headache and review it for                      patterns or new triggers.  ? Keep taking my preventive (controller) medicine daily.  ? Take my relief and rescue medicine as needed.  ? Call my doctor or clinic at if I stay in the Yellow Zone.             RED ZONE = Poor or No Control    My headache plan has  failed. I can t do anything  when I have one. My  medicines aren t working.           I WILL:   ? Set goals to control my triggers and act on them.  ? Write in my migraine diary each time I have a headache and review it for                      patterns or new triggers.  ? Keep taking my preventive (controller) medicine daily.  ? Take my relief and rescue medicine as needed.  ? Call my doctor or clinic or go to urgent care or an ER if I m having the worst                  headache of my life.  ? Call my doctor or  clinic or go to urgent care or an ER if my medicine doesn t work.  ? Let my doctor or clinic know within 2 weeks if I have gone to an urgent care or             emergency department.          Provider specific instructions:

## 2017-10-24 NOTE — MR AVS SNAPSHOT
After Visit Summary   10/24/2017    Samara Oropeza    MRN: [id number]           Patient Information     Date Of Birth          1995        Visit Information        Provider Department      10/24/2017 11:00 AM Sonja Abreu APRN [facility]        Today's Diagnoses     Behcet's disease (H)    -  1    Dyspareunia in female        Routine screening for STI (sexually transmitted infection)        Cervical cancer screening        Encounter for immunization          Care Instructions      Preventive Health Recommendations  Female Ages 18 to 25     Yearly exam:     See your health care provider every year in order to  o Review health changes.   o Discuss preventive care.    o Review your medicines if your doctor has prescribed any.      You should be tested each year for STDs (sexually transmitted diseases).       After age 20, talk to your provider about how often you should have cholesterol testing.      Starting at age 21, get a Pap test every three years. If you have an abnormal result, your doctor may have you test more often.      If you are at risk for diabetes, you should have a diabetes test (fasting glucose).     Shots:     Get a flu shot each year.     Get a tetanus shot every 10 years.     Consider getting the shot (vaccine) that prevents cervical cancer (Gardasil).    Nutrition:     Eat at least 5 servings of fruits and vegetables each day.    Eat whole-grain bread, whole-wheat pasta and brown rice instead of white grains and rice.    Talk to your provider about Calcium and Vitamin D.     Lifestyle    Exercise at least 150 minutes a week each week (30 minutes a day, 5 days a week). This will help you control your weight and prevent disease.    Limit alcohol to one drink per day.    No smoking.     Wear sunscreen to prevent skin cancer.    See your dentist every six months for an exam and cleaning.          Follow-ups after your visit        Additional Services   "   PHYSICAL THERAPY REFERRAL       *This therapy referral will be filtered to a centralized scheduling office at [facility] and the patient will receive a call to schedule an appointment at a [place] location most convenient for them. *     [facility] provides Physical Therapy evaluation and treatment and many specialty services across the [place] system.  If requesting a specialty program, please choose from the list below.    If you have not heard from the scheduling office within 2 business days, please call [phone] for all locations, with the exception of [place], please call [phone].  Treatment: Evaluation & Treatment  Special Instructions/Modalities:   Special Programs: Incontinence Pelvic Floor Program    Please be aware that coverage of these services is subject to the terms and limitations of your health insurance plan.  Call member services at your health plan with any benefit or coverage questions.      **Note to Provider:  If you are referring outside of [place] for the therapy appointment, please list the name of the location in the \"special instructions\" above, print the referral and give to the patient to schedule the appointment.                  Your next 10 appointments already scheduled     Oct 25, 2017  7:00 AM CDT   (Arrive by 6:45 AM)   MR BRAIN W/O CONTRAST with [id number]   [facility] ([facility])    [address] [place] [address] [phone]           Take your medicines as usual, unless your doctor tells you not to. Bring a list of your current medicines to your exam (including vitamins, minerals and over-the-counter drugs). Also bring the results of similar scans you may have had.  Please remove any body piercings and hair extensions before you arrive.  Follow your doctor s orders. If you do not, we may have to postpone your exam.  You will not have contrast for this " exam. You do not need to do anything special to prepare.  The MRI machine uses a strong magnet. Please wear clothes without metal (snaps, zippers). A sweatsuit works well, or we may give you a hospital gown.   **IMPORTANT** THE INSTRUCTIONS BELOW ARE ONLY FOR THOSE PATIENTS WHO HAVE BEEN TOLD THEY WILL RECEIVE SEDATION OR GENERAL ANESTHESIA DURING THEIR MRI PROCEDURE:  IF YOU WILL RECEIVE SEDATION (take medicine to help you relax during your exam):   You must get the medicine from your doctor before you arrive. Bring the medicine to the exam. Do not take it at home.   Arrive one hour early. Bring someone who can take you home after the test. Your medicine will make you sleepy. After the exam, you may not drive, take a bus or take a taxi by yourself.   No eating 8 hours before your exam. You may have clear liquids up until 4 hours before your exam. (Clear liquids include water, clear tea, black coffee and fruit juice without pulp.)  IF YOU WILL RECEIVE ANESTHESIA (be asleep for your exam):   Arrive 1 1/2 hours early. Bring someone who can take you home after the test. You may not drive, take a bus or take a taxi by yourself.   No eating 8 hours before your exam. You may have clear liquids up until 4 hours before your exam. (Clear liquids include water, clear tea, black coffee and fruit juice without pulp.)   You will spend four to five hours in the recovery room.  Please call the Imaging Department at your exam site with any questions.            Oct 25, 2017  8:30 AM CDT   (Arrive by 8:15 AM)   In Lab Video Visit with  EEG TECH 2   EEG [facility] ([facility])    [address]  3rd Floor  [facility] [id number] [phone]            Oct 31, 2017  9:15 AM CDT   Return Visit with Emily Rollins PT   [facility] ([facility])    [address] [place], [place] [address]  [facility] [address]  [facility] [id number] [phone]            Nov 02, 2017  2:30 PM CDT   Return  Visit with Kimo Hudson LP   [facility] ([facility])    [address]  [facility] [address] [phone]            Nov 16, 2017  2:30 PM CST   Return Visit with Kimo Hudson LP   [facility] ([facility])    [address]  [facility] [address] [phone]            Nov 21, 2017 11:00 AM CST   Return Visit with Emily Rollins PT   [facility] ([facility])    [address]  [facility] [address] [phone]            Dec 06, 2017  3:00 PM CST   (Arrive by 2:45 PM)   Return Botox with Frederick Bender MD   [facility] ([facility])    [address]  3rd Floor  [facility] [id number] [phone]            Dec 12, 2017  2:45 PM CST   [facility] Visit with Cata Hurst NP   [facility]    [address]  [address]  [facility] [address] [phone]            Jan 16, 2018 11:30 AM CST   Return Visit with Austen Marquez MD   [facility] ([facility])    [address] [address] [phone]            Jan 17, 2018  2:20 PM CST   (Arrive by 2:05 PM)   New Patient Visit with Estela Cuenca MD   [facility] ([facility])    [address]  4th Floor  [facility] [id number] [phone]              Who to contact     If you have questions or need follow up information about today's clinic visit or your schedule please contact [facility] directly at [phone].  Normal or non-critical lab and imaging results will be communicated to you by MyChart, letter or phone within 4 business days after the clinic has received the results. If you do not hear from us  "within 7 days, please contact the clinic through [employer] or phone. If you have a critical or abnormal lab result, we will notify you by phone as soon as possible.  Submit refill requests through [employer] or call your pharmacy and they will forward the refill request to us. Please allow 3 business days for your refill to be completed.          Additional Information About Your Visit        [employer] Information     [employer] gives you secure access to your electronic health record. If you see a primary care provider, you can also send messages to your care team and make appointments. If you have questions, please call your primary care clinic.  If you do not have a primary care provider, please call [phone] and they will assist you.        Care EveryWhere ID     This is your Care EveryWhere ID. This could be used by other organizations to access your [facility] medical records  [id number]        Your Vitals Were     Pulse Temperature Respirations Height Last Period Pulse Oximetry    144 98.8  F (37.1  C) (Oral) 16 5' 3\" (1.6 m) 09/28/2017 (Exact Date) 99%    BMI (Body Mass Index)                   26.39 kg/m2            Blood Pressure from Last 3 Encounters:   10/24/17 152/90   10/23/17 129/85   10/18/17 110/80    Weight from Last 3 Encounters:   10/24/17 149 lb (67.6 kg)   10/23/17 148 lb (67.1 kg)   10/18/17 149 lb (67.6 kg)              We Performed the Following     ADMIN 1st VACCINE     CHLAMYDIA TRACHOMATIS PCR     MIGRAINE ACTION PLAN     NEISSERIA GONORRHOEA PCR     PAP imaged thin layer screen only - recommended age 21 - 24 years     PHYSICAL THERAPY REFERRAL     TDAP VACCINE (ADACEL)        Primary Care Provider Office Phone # Fax #    EVI Cardona [phone]       [address] [facility] [address]  [facility] [address]        Equal Access to Services     NABIL GALEANO : Brandon Santiago, channing hurtado, qaybta kajayson juan, mike rodriguez . " So [facility] [phone].    ATENCIÓN: Si henry nuñez, tiene a livingston disposición servicios gratuitos de asistencia lingüística. Jesus bullard [phone].    We comply with applicable federal civil rights laws and [place] laws. We do not discriminate on the basis of race, color, national origin, age, disability, sex, sexual orientation, or gender identity.            Thank you!     Thank you for choosing [facility]  for your care. Our goal is always to provide you with excellent care. Hearing back from our patients is one way we can continue to improve our services. Please take a few minutes to complete the written survey that you may receive in the mail after your visit with us. Thank you!             Your Updated Medication List - Protect others around you: Learn how to safely use, store and throw away your medicines at www.disposemymeds.org.          This list is accurate as of: 10/24/17 11:58 AM.  Always use your most recent med list.                   Brand Name Dispense Instructions for use Diagnosis    albuterol 108 (90 BASE) MCG/ACT Inhaler    PROAIR HFA/PROVENTIL HFA/VENTOLIN HFA    1 Inhaler    Inhale 2 puffs into the lungs every 6 hours as needed for shortness of breath / dyspnea or wheezing    Atypical chest pain       amitriptyline 50 MG tablet    ELAVIL    30 tablet    Take 1 tablet (50 mg) by mouth At Bedtime    Abdominal pain, generalized, Insomnia due to medical condition       apremilast 30 MG tablet    OTEZLA    60 tablet    20 mg in AM and 30mg in PM daily X 2 weeks and then 30mg twice daily.    Behcet's disease (H)       ASPIRIN CHILDRENS 81 MG chewable tablet   Generic drug:  aspirin      Take 81 mg by mouth as needed        betamethasone valerate 0.1 % cream    VALISONE     Apply topically 2 times daily        * botulinum toxin type A 100 UNITS injection    BOTOX    200 Units    Inject 200 Units into the muscle every 3 months    Cervical dystonia       * ONABOTULINUMTOXINA IJ       Inject 165 Units as directed once Lot# /[id number] Expiration: 04/2020        carbamide peroxide 6.5 % otic solution    DEBROX     5 drops 2 times daily        clobetasol 0.05 % cream    TEMOVATE    60 g    Apply topically 2 times daily    Folliculitis       Colchicine 0.6 MG Caps     90 capsule    Take 0.6 mg by mouth See Admin Instructions 2 capsules in AM and 1 capsule in PM    Behcet's syndrome (H)       COMPOUNDED NON-CONTROLLED SUBSTANCE - PHARMACY TO MIX COMPOUNDED MEDICATION    CMPD RX    30 capsule    Take one capsule in the morning    Fibromyalgia       diclofenac 1 % Gel topical gel    VOLTAREN    100 g    Apply 2-4 grams to affected area(s) up to 4 times per day as needed. This is an anti-inflammatory medication.    Polyarthralgia       dicyclomine 20 MG tablet    BENTYL    60 tablet    Take 1 tablet (20 mg) by mouth 4 times daily as needed    Abdominal cramping       diphenhydrAMINE 25 MG tablet    BENADRYL    30 tablet    Take 1 tablet (25 mg) by mouth every 8 hours as needed for allergies        EPINEPHrine 0.3 MG/0.3ML injection 2-pack    EPIPEN 2-EAMON    0.6 mL    Inject 0.3 mLs (0.3 mg) into the muscle as needed for anaphylaxis    Hx of bee sting allergy       EXCEDRIN MIGRAINE PO      Take by mouth as needed        guanFACINE 1 MG tablet    TENEX    180 tablet    Take 3 tablets (3 mg) by mouth twice daily morning and evening.    Tic       hydrOXYzine 25 MG tablet    ATARAX    60 tablet    Take 1-2 tablets (25-50 mg) by mouth every 6 hours as needed for anxiety    TAHIR (generalized anxiety disorder)       hyoscyamine 0.125 MG tablet    ANASPAZ/LEVSIN    40 tablet    Take 1-2 tablets (125-250 mcg) by mouth every 4 hours as needed for cramping    Abdominal pain, generalized       hypromellose 0.3 % Soln ophthalmic solution    GENTEAL     1 drop every hour as needed        LACTAID PO      Take by mouth daily        lactulose 20 GM/30ML Soln     300 mL    Take 30 mLs by mouth 3 times daily as needed     Constipation, unspecified constipation type       lidocaine 2 % topical gel    XYLOCAINE     Apply 1 Tube topically daily Reported on 4/21/2017        lidocaine visc 2% 2.5mL/5mL & maalox/mylanta w/ simeth 2.5mL/5mL & diphenhydrAMINE 5mg/5mL Susp suspension    [facility] Mouthwash [facility]    1 Bottle    Swish and swallow 10 mLs in mouth every 6 hours as needed for mouth sores    Behcet's syndrome (H)       linaclotide 290 MCG capsule    LINZESS    90 capsule    Take 1 capsule (290 mcg) by mouth every morning (before breakfast)    Irritable bowel syndrome       LORazepam 1 MG tablet    ATIVAN    60 tablet    Take 0.5 tablets (0.5 mg) by mouth 2 times daily as needed for other (atypical chest pain) Do not operate a vehicle after taking this medication    Chronic abdominal pain       meclizine 25 MG tablet    ANTIVERT    30 tablet    Take 1 tablet (25 mg) by mouth every 6 hours as needed for dizziness    Nausea       norgestimate-ethinyl estradiol 0.25-35 MG-MCG per tablet    ORTHO-CYCLEN, SPRINTEC    84 tablet    Take 1 tablet by mouth daily    General counseling for prescription of oral contraceptives       omeprazole 40 MG capsule    priLOSEC    90 capsule    Take 1 capsule (40 mg) by mouth daily    Gastroesophageal reflux disease, esophagitis presence not specified       ondansetron 8 MG ODT tab    ZOFRAN-ODT    60 tablet    Take 1 tablet (8 mg) by mouth every 8 hours as needed for nausea    Non-intractable vomiting with nausea, unspecified vomiting type, Hematemesis, presence of nausea not specified       promethazine 25 MG tablet    PHENERGAN    20 tablet    Take 0.5-1 tablets (12.5-25 mg) by mouth every 6 hours as needed for nausea    Intractable chronic migraine without aura and without status migrainosus       sucralfate 1 GM/10ML suspension    CARAFATE    1200 mL    Take 10 mLs (1 g) by mouth 4 times daily    Bile reflux gastritis, Nausea       triamcinolone 0.1 % cream    KENALOG    15 g    Apply sparingly to  oral ulcers three times daily for 14 days as needed.    Behcet's syndrome (H)       * Notice:  This list has 2 medication(s) that are the same as other medications prescribed for you. Read the directions carefully, and ask your doctor or other care provider to review them with you.

## 2017-10-24 NOTE — PROGRESS NOTES
SUBJECTIVE:     SUBJECTIVE:   Samara Oropeza is a 22 year old female who presents to clinic today with boyfriend Avi due to need for screening pap smear. Currently taking birth control. Has not been having sex due to pain with sex. 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 Periods have been regular. No Bowel or bladder incontinence. Has been told by rheumatologist that they would like her to do Pelvic Floor physical therapy due to her history of behcets, and she would like an order to get that referral today.       ROS  GENERAL: Fever - no; Poor appetite - YES; Sleep disruption - no    SKIN: Rash - No; Hives - No; Eczema - No;  EYE: Pain - No; Discharge - No; Redness - No; Itching - No; Vision Problems - No;  ENT: Ear Pain - No; Runny nose - No; Congestion - No; Sore Throat - No;  RESP: Cough - No; Wheezing - No; Difficulty Breathing - No;  GI: Vomiting - No; Diarrhea - No; Abdominal Pain - YES; Constipation - YES;      NEURO: Headache - YES; Tics - YES;      PSYCH: History of depression or ODD - YES; Suicide attempts - No; Cutting - No;        PROBLEM LISTPatient Active Problem List    Diagnosis Date Noted     Vitamin D deficiency 10/11/2017     Priority: Medium     How low, unknown/not found. On D when tested at 27. Starting cholecalciferol October 2017. Needs recheck.       Convulsions, unspecified convulsion type (H) 10/03/2017     Priority: Medium     Transient alteration of awareness 10/03/2017     Priority: Medium     Chronic pain syndrome 07/27/2017     Priority: Medium     Major depressive disorder, recurrent episode, moderate (H) 06/27/2017     Priority: Medium     Cervical pain 05/02/2017     Priority: Medium     Acute left ankle pain 03/31/2017     Priority: Medium     Cervical dystonia 03/28/2017     Priority: Medium     PTSD (post-traumatic stress disorder) 01/17/2017     Priority: Medium     Left knee pain 10/20/2016     Priority: Medium     Patellofemoral  instability 10/20/2016     Priority: Medium     Fibromyalgia 08/04/2016     Priority: Medium     Rheumatoid arthritis of multiple sites without rheumatoid factor (H) 08/04/2016     Priority: Medium     Raynaud's disease without gangrene 08/04/2016     Priority: Medium     Chronic abdominal pain 08/04/2016     Priority: Medium     Palpitations 01/12/2016     Priority: Medium     On colchicine therapy 10/30/2015     Priority: Medium     Spell of shaking 05/06/2015     Priority: Medium     Migraine 02/04/2015     Priority: Medium     Problem list name updated by automated process. Provider to review       Behcet's disease (H) 12/10/2014     Priority: Medium     Headaches due to old head injury 11/12/2013     Priority: Medium     Milk protein intolerance 10/11/2013     Priority: Medium     Concussion 02/13/2013     Priority: Medium     Jan 2013, with prolonged recovery- followed by sports med         Knee pain 01/03/2013     Priority: Medium     TAHIR (generalized anxiety disorder) 06/25/2009     Priority: Medium     Tics - Tourette syndrome 05/18/2009     Priority: Medium     Followed by psychotherapy. Symptoms well managed. Originally diagnosed at [facility] neurology. (Dr. Simpson)           IBS (irritable bowel syndrome) 05/18/2009     Priority: Medium     Seasonal allergic rhinitis 05/18/2009     Priority: Medium      MEDICATIONS  Current Outpatient Prescriptions   Medication Sig Dispense Refill     COMPOUNDED NON-CONTROLLED SUBSTANCE (CMPD RX) - PHARMACY TO MIX COMPOUNDED MEDICATION Take one capsule in the morning 30 capsule 0     amitriptyline (ELAVIL) 50 MG tablet Take 1 tablet (50 mg) by mouth At Bedtime 30 tablet 11     lactulose 20 GM/30ML SOLN Take 30 mLs by mouth 3 times daily as needed 300 mL 3     LORazepam (ATIVAN) 1 MG tablet Take 0.5 tablets (0.5 mg) by mouth 2 times daily as needed for other (atypical chest pain) Do not operate a vehicle after taking this medication 60 tablet 0     hydrOXYzine (ATARAX) 25  MG tablet Take 1-2 tablets (25-50 mg) by mouth every 6 hours as needed for anxiety 60 tablet 1     guanFACINE (TENEX) 1 MG tablet Take 3 tablets (3 mg) by mouth twice daily morning and evening. 180 tablet 0     ondansetron (ZOFRAN-ODT) 8 MG ODT tab Take 1 tablet (8 mg) by mouth every 8 hours as needed for nausea 60 tablet 6     ONABOTULINUMTOXINA IJ Inject 165 Units as directed once Lot# /C3  Expiration: 04/2020       Colchicine 0.6 MG CAPS Take 0.6 mg by mouth See Admin Instructions 2 capsules in AM and 1 capsule in PM 90 capsule 3     diphenhydrAMINE (BENADRYL) 25 MG tablet Take 1 tablet (25 mg) by mouth every 8 hours as needed for allergies 30 tablet 0     linaclotide (LINZESS) 290 MCG capsule Take 1 capsule (290 mcg) by mouth every morning (before breakfast) 90 capsule 1     sucralfate (CARAFATE) 1 GM/10ML suspension Take 10 mLs (1 g) by mouth 4 times daily 1200 mL 2     diclofenac (VOLTAREN) 1 % GEL topical gel Apply 2-4 grams to affected area(s) up to 4 times per day as needed. This is an anti-inflammatory medication. 100 g 4     aspirin (ASPIRIN CHILDRENS) 81 MG chewable tablet Take 81 mg by mouth as needed        dicyclomine (BENTYL) 20 MG tablet Take 1 tablet (20 mg) by mouth 4 times daily as needed 60 tablet 1     omeprazole (PRILOSEC) 40 MG capsule Take 1 capsule (40 mg) by mouth daily 90 capsule 3     EPINEPHrine (EPIPEN 2-EAMON) 0.3 MG/0.3ML injection Inject 0.3 mLs (0.3 mg) into the muscle as needed for anaphylaxis 0.6 mL 3     apremilast (OTEZLA) 30 MG tablet 20 mg in AM and 30mg in PM daily X 2 weeks and then 30mg twice daily. 60 tablet 3     norgestimate-ethinyl estradiol (ORTHO-CYCLEN, SPRINTEC) 0.25-35 MG-MCG per tablet Take 1 tablet by mouth daily 84 tablet 3     albuterol (PROAIR HFA/PROVENTIL HFA/VENTOLIN HFA) 108 (90 BASE) MCG/ACT Inhaler Inhale 2 puffs into the lungs every 6 hours as needed for shortness of breath / dyspnea or wheezing 1 Inhaler 1     promethazine (PHENERGAN) 25 MG tablet  Take 0.5-1 tablets (12.5-25 mg) by mouth every 6 hours as needed for nausea 20 tablet 1     meclizine (ANTIVERT) 25 MG tablet Take 1 tablet (25 mg) by mouth every 6 hours as needed for dizziness 30 tablet 1     Magic Mouthwash (FV std formula) lidocaine visc 2% 2.5mL/5mL & maalox/mylanta w/ simeth 2.5mL/5mL & diphenhydrAMINE 5mg/5mL Swish and swallow 10 mLs in mouth every 6 hours as needed for mouth sores 1 Bottle 1     clobetasol (TEMOVATE) 0.05 % cream Apply topically 2 times daily 60 g 0     botulinum toxin type A (BOTOX) 100 UNITS injection Inject 200 Units into the muscle every 3 months 200 Units 3     hyoscyamine (ANASPAZ,LEVSIN) 0.125 MG tablet Take 1-2 tablets (125-250 mcg) by mouth every 4 hours as needed for cramping 40 tablet 1     Aspirin-Acetaminophen-Caffeine (EXCEDRIN MIGRAINE PO) Take by mouth as needed        hypromellose (GENTEAL) 0.3 % SOLN 1 drop every hour as needed       betamethasone valerate (VALISONE) 0.1 % cream Apply topically 2 times daily       carbamide peroxide (DEBROX) 6.5 % otic solution 5 drops 2 times daily       Lactase (LACTAID PO) Take by mouth daily       lidocaine (XYLOCAINE) 2 % jelly Apply 1 Tube topically daily Reported on 4/21/2017       triamcinolone (KENALOG) 0.1 % cream Apply sparingly to oral ulcers three times daily for 14 days as needed. 15 g 1      ALLERGIES  Allergies   Allergen Reactions     Amoxil [Penicillins] Rash     Dad unsure of reaction.     Bee Venom Anaphylaxis     Contrast Dye Rash     Contrast Media Ready-Box [facility], 04/09/2014.; Contrast Media Ready-Box [facility], 04/09/2014.  NOTE: this is a contrast media oral with iodine. Premedicate with methylpred standard for IV contrast, request barium contrast for oral contrast.     Kiwi Swelling     Orange Fruit [Citrus] Anaphylaxis     Pineapple Anaphylaxis, Difficulty breathing and Rash     Milk Protein Extract Hives     Reglan [Metoclopramide] Other (See Comments)     IV dose only, in ER, rapid heart rate.     Ace  "Inhibitors      Difficulty in breathing and GI upset     Amitiza [Lubiprostone] Nausea and Vomiting     Amoxicillin-Pot Clavulanate      Latex      Midazolam Unknown     Other reaction(s): Unknown  parent states that when pt takes this medication, she wakes up being very violent .  parent states that when pt takes this medication, she wakes up being very violent .     Nuts      Contrast Media Ready-Box [facility], 04/09/2014.; Contrast Media Ready-Box [facility], 04/09/2014.       Versed      Coming out of pelvic exam at age of 5, was kicking and screaming when coming out of the versed.     Adhesive Tape Rash     Azithromycin Hives and Rash     Cephalexin Itching and Rash     Itchy mouth  Itchy mouth     Keflex [Cephalexin-Fd&C Yellow #6] Hives     Lac Bovis Rash and GI Disturbance     Other reaction(s): Abdominal Pain       Reviewed and updated as needed this visit by clinical staff  Tobacco  Allergies  Meds         Reviewed and updated as needed this visit by Provider       OBJECTIVE:     /90 (BP Location: Left arm)  Pulse 144  Temp 98.8  F (37.1  C) (Oral)  Resp 16  Ht 5' 3\" (1.6 m)  Wt 149 lb (67.6 kg)  LMP 09/28/2017 (Exact Date)  SpO2 99%  BMI 26.39 kg/m2  Normalized stature-for-age data not available for patients older than 20 years.  Normalized weight-for-age data not available for patients older than 20 years.  Normalized BMI data available only for age 0 to 20 years.  Normalized stature-for-age data not available for patients older than 20 years.    GENERAL: Active, alert, in no acute distress.  SKIN: Clear. No significant rash, abnormal pigmentation or lesions  NECK: Supple, no masses.  LYMPH NODES: No adenopathy  LUNGS: Clear. No rales, rhonchi, wheezing or retractions  HEART: Regular rhythm. Normal S1/S2. No murmurs.  ABDOMEN: Soft, non-tender, not distended, no masses or hepatosplenomegaly. Bowel sounds normal.   GENITALIA: Normal vulva, no lesions present, no discharge, cervix normal appearing, " no lesions    DIAGNOSTICS: None    ASSESSMENT/PLAN:       ICD-10-CM    1. Behcet's disease (H) M35.2 PHYSICAL THERAPY REFERRAL   2. Dyspareunia in female N94.10 PHYSICAL THERAPY REFERRAL   3. Routine screening for STI (sexually transmitted infection) Z11.3 NEISSERIA GONORRHOEA PCR     CHLAMYDIA TRACHOMATIS PCR     ADMIN 1st VACCINE   4. Cervical cancer screening Z12.4 PAP imaged thin layer screen only - recommended age 21 - 24 years   5. Encounter for immunization Z23 TDAP VACCINE (ADACEL)     EA ADD'L VACCINE   6. Need for prophylactic vaccination and inoculation against influenza Z23 FLU VAC, SPLIT VIRUS IM > 3 YO (QUADRIVALENT) [65535]         EVI Cardona CNP         Counseling Resources:  ATP IV Guidelines  Pooled Cohorts Equation Calculator  Breast Cancer Risk Calculator  FRAX Risk Assessment  ICSI Preventive Guidelines  Dietary Guidelines for Americans, 2010  USDA's MyPlate  ASA Prophylaxis  Lung CA Screening    EVI Cardona CNP  [facility]  Injectable Influenza Immunization Documentation    1.  Is the person to be vaccinated sick today?   No    2. Does the person to be vaccinated have an allergy to a component   of the vaccine?   No  Egg Allergy Algorithm Link    3. Has the person to be vaccinated ever had a serious reaction   to influenza vaccine in the past?   No    4. Has the person to be vaccinated ever had Guillain-Barré syndrome?   No    Form completed by Mari Contreras CMA

## 2017-10-25 ENCOUNTER — OFFICE VISIT (OUTPATIENT)
Dept: NEUROLOGY | Facility: CLINIC | Age: 22
End: 2017-10-25

## 2017-10-25 DIAGNOSIS — R56.9 CONVULSIONS, UNSPECIFIED CONVULSION TYPE (H): ICD-10-CM

## 2017-10-25 DIAGNOSIS — R40.4 TRANSIENT ALTERATION OF AWARENESS: ICD-10-CM

## 2017-10-25 LAB
C TRACH DNA SPEC QL NAA+PROBE: NEGATIVE
N GONORRHOEA DNA SPEC QL NAA+PROBE: NEGATIVE
SPECIMEN SOURCE: NORMAL
SPECIMEN SOURCE: NORMAL

## 2017-10-25 NOTE — MR AVS SNAPSHOT
After Visit Summary   10/25/2017    Samara Oropeza    MRN: [id number]           Patient Information     Date Of Birth          1995        Visit Information        Provider Department      10/25/2017 8:30 AM [facility] EEG TECH 2 [facility]        Today's Diagnoses     Convulsions, unspecified convulsion type (H)        Transient alteration of awareness           Follow-ups after your visit        Your next 10 appointments already scheduled     Oct 31, 2017  9:15 AM CDT   Return Visit with Emily Rollins PT   [facility] ([facility])    [address]  [facility] [address]   [phone]            Nov 02, 2017  2:30 PM CDT   Return Visit with Kimo Hudson LP   [facility] ([facility])    [address]  [facility] [address] [phone]            Nov 06, 2017  4:00 PM CST   (Arrive by 3:45 PM)   LUIZ For Women Only with Ashtyn Rivas PT   [facility] ([facility])    [address] [place] [address]   [phone]            Nov 10, 2017 10:00 AM CST   New Visit with KOURTNEY Avalos   [facility] ([facility]  [address] [place], [place] [address]  [facility] [address]   [phone]            Nov 16, 2017  2:30 PM CST   Return Visit with Kimo Hudson LP   [facility] ([facility])    [address]  [facility] [address] [phone]            Nov 21, 2017 11:00 AM CST   Return Visit with Emily Rollins PT   [facility] ([facility])    [address]  [facility] [address] [phone]            Dec 06, 2017  3:00 PM CST   (Arrive by 2:45 PM)   Return Botox with Frederick Bender MD   [facility] ([facility])    [address]  3rd Floor  [facility] [id number] [phone]            Dec 12, 2017  2:45 PM CST   Return Visit with Cata Hurst NP   [facility] ([facility])    [address]  [address]  [facility] [address] [phone]            Jan 16, 2018 11:30 AM CST   Return Visit with Austen Marquez MD   [facility] ([facility])    [address] [id number] [phone]            Jan 17, 2018  2:20 PM CST   (Arrive by 2:05 PM)   New Patient Visit with Estela Cuenca MD   [facility] ([facility] [place])    [address]  4th Floor  [facility] [id number] [phone]              Who to contact     Please call your clinic at [phone] to:    Ask questions about your health    Make or cancel appointments    Discuss your medicines    Learn about your test results    Speak to your doctor   If you have compliments or concerns about an experience at your clinic, or if you wish to file a complaint, please contact [facility] Patient Relations at [phone] or email us at [email or web address]         Additional Information About Your Visit        MyChart Information     [employer] gives you secure access to your electronic health record. If you see a primary care provider, you can also send messages to your care team and make appointments. If you have questions, please call your primary care clinic.  If you do not have a primary care provider, please call [phone] and they will assist you.      [employer] is an electronic gateway that provides easy, online access to your medical records. With [employer] you can request a clinic appointment, read your test results, renew a prescription or communicate with your care team.     To access your existing account, please contact  your [facility] or call [phone] for assistance.        Care EveryWhere ID     This is your Care EveryWhere ID. This could be used by other organizations to access your [facility] medical records  [id number]        Your Vitals Were     Last Period                   09/28/2017 (Exact Date)            Blood Pressure from Last 3 Encounters:   10/24/17 152/90   10/23/17 129/85   10/18/17 110/80    Weight from Last 3 Encounters:   10/24/17 67.6 kg (149 lb)   10/23/17 67.1 kg (148 lb)   10/18/17 67.6 kg (149 lb)              We Performed the Following     <12 hour EEG Monitoring/Video (68503 mod 52)     EEG video monitoring     EEG        Primary Care Provider Office Phone # Fax #    Sonja EVI Laguerre [facility] [phone]       [address]  [facility] [address]        Equal Access to Services     NABIL GALEANO : Hadii aad ku hadasho Soomaali, waaxda luqadaha, qaybta kaalmada adeegyada, waxay verónicain haysophian maddie rodriguez . So [facility] [phone].    ATENCIÓN: Si habla español, tiene a livingston disposición servicios gratuitos de asistencia lingüística. Llame al [phone].    We comply with applicable federal civil rights laws and [place] laws. We do not discriminate on the basis of race, color, national origin, age, disability, sex, sexual orientation, or gender identity.            Thank you!     Thank you for choosing [facility]  for your care. Our goal is always to provide you with excellent care. Hearing back from our patients is one way we can continue to improve our services. Please take a few minutes to complete the written survey that you may receive in the mail after your visit with us. Thank you!             Your Updated Medication List - Protect others around you: Learn how to safely use, store and throw away your medicines at www.disposemymeds.org.          This list is accurate as of: 10/25/17 11:59 PM.  Always use your most recent med  list.                   Brand Name Dispense Instructions for use Diagnosis    albuterol 108 (90 BASE) MCG/ACT Inhaler    PROAIR HFA/PROVENTIL HFA/VENTOLIN HFA    1 Inhaler    Inhale 2 puffs into the lungs every 6 hours as needed for shortness of breath / dyspnea or wheezing    Atypical chest pain       amitriptyline 50 MG tablet    ELAVIL    30 tablet    Take 1 tablet (50 mg) by mouth At Bedtime    Abdominal pain, generalized, Insomnia due to medical condition       apremilast 30 MG tablet    OTEZLA    60 tablet    20 mg in AM and 30mg in PM daily X 2 weeks and then 30mg twice daily.    Behcet's disease (H)       ASPIRIN CHILDRENS 81 MG chewable tablet   Generic drug:  aspirin      Take 81 mg by mouth as needed        betamethasone valerate 0.1 % cream    VALISONE     Apply topically 2 times daily        * botulinum toxin type A 100 UNITS injection    BOTOX    200 Units    Inject 200 Units into the muscle every 3 months    Cervical dystonia       * ONABOTULINUMTOXINA IJ      Inject 165 Units as directed once Lot# /C3 Expiration: 04/2020        carbamide peroxide 6.5 % otic solution    DEBROX     5 drops 2 times daily        clobetasol 0.05 % cream    TEMOVATE    60 g    Apply topically 2 times daily    Folliculitis       Colchicine 0.6 MG Caps     90 capsule    Take 0.6 mg by mouth See Admin Instructions 2 capsules in AM and 1 capsule in PM    Behcet's syndrome (H)       COMPOUNDED NON-CONTROLLED SUBSTANCE - PHARMACY TO MIX COMPOUNDED MEDICATION    CMPD RX    30 capsule    Take one capsule in the morning    Fibromyalgia       diclofenac 1 % Gel topical gel    VOLTAREN    100 g    Apply 2-4 grams to affected area(s) up to 4 times per day as needed. This is an anti-inflammatory medication.    Polyarthralgia       dicyclomine 20 MG tablet    BENTYL    60 tablet    Take 1 tablet (20 mg) by mouth 4 times daily as needed    Abdominal cramping       diphenhydrAMINE 25 MG tablet    BENADRYL    30 tablet    Take 1  tablet (25 mg) by mouth every 8 hours as needed for allergies        EPINEPHrine 0.3 MG/0.3ML injection 2-pack    EPIPEN 2-EAMON    0.6 mL    Inject 0.3 mLs (0.3 mg) into the muscle as needed for anaphylaxis    Hx of bee sting allergy       EXCEDRIN MIGRAINE PO      Take by mouth as needed        guanFACINE 1 MG tablet    TENEX    180 tablet    Take 3 tablets (3 mg) by mouth twice daily morning and evening.    Tic       hydrOXYzine 25 MG tablet    ATARAX    60 tablet    Take 1-2 tablets (25-50 mg) by mouth every 6 hours as needed for anxiety    TAHIR (generalized anxiety disorder)       hyoscyamine 0.125 MG tablet    ANASPAZ/LEVSIN    40 tablet    Take 1-2 tablets (125-250 mcg) by mouth every 4 hours as needed for cramping    Abdominal pain, generalized       hypromellose 0.3 % Soln ophthalmic solution    GENTEAL     1 drop every hour as needed        LACTAID PO      Take by mouth daily        lactulose 20 GM/30ML Soln     300 mL    Take 30 mLs by mouth 3 times daily as needed    Constipation, unspecified constipation type       lidocaine 2 % topical gel    XYLOCAINE     Apply 1 Tube topically daily Reported on 4/21/2017        lidocaine visc 2% 2.5mL/5mL & maalox/mylanta w/ simeth 2.5mL/5mL & diphenhydrAMINE 5mg/5mL Susp suspension    [facility]    1 Bottle    Swish and swallow 10 mLs in mouth every 6 hours as needed for mouth sores    Behcet's syndrome (H)       linaclotide 290 MCG capsule    LINZESS    90 capsule    Take 1 capsule (290 mcg) by mouth every morning (before breakfast)    Irritable bowel syndrome       LORazepam 1 MG tablet    ATIVAN    60 tablet    Take 0.5 tablets (0.5 mg) by mouth 2 times daily as needed for other (atypical chest pain) Do not operate a vehicle after taking this medication    Chronic abdominal pain       meclizine 25 MG tablet    ANTIVERT    30 tablet    Take 1 tablet (25 mg) by mouth every 6 hours as needed for dizziness    Nausea       norgestimate-ethinyl estradiol  0.25-35 MG-MCG per tablet    ORTHO-CYCLEN, SPRINTEC    84 tablet    Take 1 tablet by mouth daily    General counseling for prescription of oral contraceptives       omeprazole 40 MG capsule    priLOSEC    90 capsule    Take 1 capsule (40 mg) by mouth daily    Gastroesophageal reflux disease, esophagitis presence not specified       ondansetron 8 MG ODT tab    ZOFRAN-ODT    60 tablet    Take 1 tablet (8 mg) by mouth every 8 hours as needed for nausea    Non-intractable vomiting with nausea, unspecified vomiting type, Hematemesis, presence of nausea not specified       promethazine 25 MG tablet    PHENERGAN    20 tablet    Take 0.5-1 tablets (12.5-25 mg) by mouth every 6 hours as needed for nausea    Intractable chronic migraine without aura and without status migrainosus       sucralfate 1 GM/10ML suspension    CARAFATE    1200 mL    Take 10 mLs (1 g) by mouth 4 times daily    Bile reflux gastritis, Nausea       triamcinolone 0.1 % cream    KENALOG    15 g    Apply sparingly to oral ulcers three times daily for 14 days as needed.    Behcet's syndrome (H)       * Notice:  This list has 2 medication(s) that are the same as other medications prescribed for you. Read the directions carefully, and ask your doctor or other care provider to review them with you.

## 2017-10-26 LAB
COPATH REPORT: NORMAL
PAP: NORMAL

## 2017-10-27 NOTE — PROCEDURES
[facility] EEG # [id number] (Out-Patient Video-EEG Monitoring)    Name:     Samara Oropeza   MRN: [id number]   : 1995   Procedure Date: 10/25/2017   Duration of Recording:  3 hours, 4 minutes.      CLINICAL SUMMARY:  This diagnostic video-EEG monitoring procedure was performed in evaluation of seizures in Samara Oropeza.  She was reported to be receiving lorazepam and amitriptyline at the time of this recording.      TECHNICAL SUMMARY:  This continuous EEG monitoring procedure was performed with 23 scalp electrodes in 10-20 system placements, and additional scalp, precordial and other surface electrodes used for electrical referencing and artifact detection.  A single channel of EKG was recorded for purposes of analyzing EKG artifacts in the EEG channels.  Video monitoring was utilized and periodically reviewed by EEG technologist and the physician for electroclinical correlation.    INTERICTAL EEG ACTIVITIES:  During maximal waking, there was a symmetric, well-modulated, approximately 10 Hz posterior dominant rhythm, which was attenuated on eye opening.  There was frequent occurrence of intermixed generalized 4-8 Hz theta slowing during waking.  Drowsiness was manifested by predominance of centrally maximum semirhythmic theta slowing and dropout of the posterior dominant rhythm during deeper drowsiness.  Symmetric sleep spindles were seen during stage II sleep.    There was symmetric bilateral driving in response to photic stimulation.  Hyperventilation was not performed.    No interictal epileptiform abnormalities were recorded.    ICTAL RECORDINGS:  No electrographic seizures and no paroxysmal behavioral events occurred during this procedure.      SUMMARY OF VIDEO-EEG MONITORING:    The interictal EEG recording in waking, drowsiness and stage II sleep was abnormal due to generalized theta slowing during waking.  No interictal epileptiform abnormalities and no electrographic seizures were recorded.     These findings indicate mild generalized cerebral dysfunction, which is etiologically nonspecific.  Clinical correlation is recommended.   Sigifredo Flores M.D., Professor of Neurology        D: 10/26/2017 17:35   T: 10/26/2017 18:20   MT: BILLY      Name:     LUCINA BAH   MRN:      [id number]        Account:        [id number]   :      1995           Procedure Date: 10/25/2017      Document: [id number]

## 2017-10-31 ENCOUNTER — OFFICE VISIT (OUTPATIENT)
Dept: PALLIATIVE MEDICINE | Facility: CLINIC | Age: 22
End: 2017-10-31
Payer: COMMERCIAL

## 2017-10-31 DIAGNOSIS — G89.4 CHRONIC PAIN SYNDROME: Primary | ICD-10-CM

## 2017-10-31 PROCEDURE — 97112 NEUROMUSCULAR REEDUCATION: CPT | Mod: GP | Performed by: PHYSICAL THERAPIST

## 2017-10-31 PROCEDURE — 97110 THERAPEUTIC EXERCISES: CPT | Mod: GP | Performed by: PHYSICAL THERAPIST

## 2017-10-31 NOTE — MR AVS SNAPSHOT
After Visit Summary   10/31/2017    Samara Oropeza    MRN: [id number]           Patient Information     Date Of Birth          1995        Visit Information        Provider Department      10/31/2017 9:15 AM Emily Rollins, PT [facility]        Today's Diagnoses     Chronic pain syndrome    -  1       Follow-ups after your visit        Your next 10 appointments already scheduled     Nov 02, 2017  2:30 PM CDT   Return Visit with Kimo Hudson LP   [facility] ([facility])    [address]  [facility] [address] [phone]            Nov 06, 2017  4:00 PM CST   (Arrive by 3:45 PM)   LUIZ For Women Only with Ashtyn Rivas PT   [facility]    [address] [place] [id number] [phone]            Nov 10, 2017 10:00 AM CST   New Visit with KOURTNEY Avalos   [facility] ([facility]  [address]  [facility] [address] [phone]            Nov 16, 2017  2:30 PM CST   Return Visit with Kimo Hudson LP   [facility] ([facility])    [address]  [facility] [address] [phone]            Nov 21, 2017 11:00 AM CST   Return Visit with Emily Rollins, PT   [facility] ([facility])    [address]  [facility] [address] [phone]            Dec 06, 2017  3:00 PM CST   (Arrive by 2:45 PM)   Return Botox with Frederick Bender MD   [facility] ([facility])    [address] [facility]  3rd Floor  [facility] [id number] [phone]            Dec 12, 2017  2:45 PM CST   Return Visit with Cata Hurst NP   [facility] ([facility])    [address]  [facility] [address]   [phone]            Jan 16, 2018 11:30 AM CST   Return Visit with Austen Marquez MD   [facility] ([facility])    [address] [id number] [phone]            Jan 17, 2018  2:20 PM CST   (Arrive by 2:05 PM)   New Patient Visit with Estela Cuenca MD   [facility] ([facility])    [address] [place] [id number] [phone]            Jan 24, 2018  2:00 PM CST   (Arrive by 1:45 PM)   Return Visit with EVI Vizcaino CNP   [facility] ([facility])    [address] [place] [id number] [phone]              Who to contact     If you have questions or need follow up information about today's clinic visit or your schedule please contact [facility] directly at [phone].  Normal or non-critical lab and imaging results will be communicated to you by MyChart, letter or phone within 4 business days after the clinic has received the results. If you do not hear from us within 7 days, please contact the clinic through [facility] or phone. If you have a critical or abnormal lab result, we will notify you by phone as soon as possible.  Submit refill requests through [facility] or call your pharmacy and they will forward the refill request to us. Please allow 3 business days for your refill to be completed.          Additional Information About Your Visit        [facility] Information     [facility] gives you secure access to your electronic health record. If you see a primary care provider, you can also send messages to your care team and make appointments. If you have questions, please call your primary care clinic.  If you do not have a primary care provider, please call [phone] and they will assist  you.        Care EveryWhere ID     This is your Care EveryWhere ID. This could be used by other organizations to access your [place] medical records  [id number]        Your Vitals Were     Last Period                   09/28/2017 (Exact Date)            Blood Pressure from Last 3 Encounters:   10/24/17 152/90   10/23/17 129/85   10/18/17 110/80    Weight from Last 3 Encounters:   10/24/17 67.6 kg (149 lb)   10/23/17 67.1 kg (148 lb)   10/18/17 67.6 kg (149 lb)              We Performed the Following     NEUROMUSCULAR RE-EDUCATION     THERAPEUTIC EXERCISES        Primary Care Provider Office Phone # Fax #    Sonja Winchester Brady, APRN [facility] [phone]       [address] [address]        Equal Access to Services     [facility] FROYLAN : Hadii aad jose antonio hadasho Soomaali, waaxda luqadaha, qaybta kaalmada adeegyada, waxjarred salinas hayarlet rodriguez . So [facility] [phone].    ATENCIÓN: Si habla español, tiene a livingston disposición servicios gratuitos de asistencia lingüística. Jesus al [phone].    We comply with applicable federal civil rights laws and [place] laws. We do not discriminate on the basis of race, color, national origin, age, disability, sex, sexual orientation, or gender identity.            Thank you!     Thank you for choosing [facility]  for your care. Our goal is always to provide you with excellent care. Hearing back from our patients is one way we can continue to improve our services. Please take a few minutes to complete the written survey that you may receive in the mail after your visit with us. Thank you!             Your Updated Medication List - Protect others around you: Learn how to safely use, store and throw away your medicines at www.disposemymeds.org.          This list is accurate as of: 10/31/17  1:25 PM.  Always use your most recent med list.                   Brand Name Dispense Instructions for use Diagnosis    albuterol 108 (90  BASE) MCG/ACT Inhaler    PROAIR HFA/PROVENTIL HFA/VENTOLIN HFA    1 Inhaler    Inhale 2 puffs into the lungs every 6 hours as needed for shortness of breath / dyspnea or wheezing    Atypical chest pain       amitriptyline 50 MG tablet    ELAVIL    30 tablet    Take 1 tablet (50 mg) by mouth At Bedtime    Abdominal pain, generalized, Insomnia due to medical condition       apremilast 30 MG tablet    OTEZLA    60 tablet    20 mg in AM and 30mg in PM daily X 2 weeks and then 30mg twice daily.    Behcet's disease (H)       ASPIRIN CHILDRENS 81 MG chewable tablet   Generic drug:  aspirin      Take 81 mg by mouth as needed        betamethasone valerate 0.1 % cream    VALISONE     Apply topically 2 times daily        * botulinum toxin type A 100 UNITS injection    BOTOX    200 Units    Inject 200 Units into the muscle every 3 months    Cervical dystonia       * ONABOTULINUMTOXINA IJ      Inject 165 Units as directed once Lot# /[id number] Expiration: 04/2020        carbamide peroxide 6.5 % otic solution    DEBROX     5 drops 2 times daily        clobetasol 0.05 % cream    TEMOVATE    60 g    Apply topically 2 times daily    Folliculitis       Colchicine 0.6 MG Caps     90 capsule    Take 0.6 mg by mouth See Admin Instructions 2 capsules in AM and 1 capsule in PM    Behcet's syndrome (H)       COMPOUNDED NON-CONTROLLED SUBSTANCE - PHARMACY TO MIX COMPOUNDED MEDICATION    CMPD RX    30 capsule    Take one capsule in the morning    Fibromyalgia       diclofenac 1 % Gel topical gel    VOLTAREN    100 g    Apply 2-4 grams to affected area(s) up to 4 times per day as needed. This is an anti-inflammatory medication.    Polyarthralgia       dicyclomine 20 MG tablet    BENTYL    60 tablet    Take 1 tablet (20 mg) by mouth 4 times daily as needed    Abdominal cramping       diphenhydrAMINE 25 MG tablet    BENADRYL    30 tablet    Take 1 tablet (25 mg) by mouth every 8 hours as needed for allergies        EPINEPHrine 0.3 MG/0.3ML  injection 2-pack    EPIPEN 2-EAMON    0.6 mL    Inject 0.3 mLs (0.3 mg) into the muscle as needed for anaphylaxis    Hx of bee sting allergy       EXCEDRIN MIGRAINE PO      Take by mouth as needed        guanFACINE 1 MG tablet    TENEX    180 tablet    Take 3 tablets (3 mg) by mouth twice daily morning and evening.    Tic       hydrOXYzine 25 MG tablet    ATARAX    60 tablet    Take 1-2 tablets (25-50 mg) by mouth every 6 hours as needed for anxiety    TAHIR (generalized anxiety disorder)       hyoscyamine 0.125 MG tablet    ANASPAZ/LEVSIN    40 tablet    Take 1-2 tablets (125-250 mcg) by mouth every 4 hours as needed for cramping    Abdominal pain, generalized       hypromellose 0.3 % Soln ophthalmic solution    GENTEAL     1 drop every hour as needed        LACTAID PO      Take by mouth daily        lactulose 20 GM/30ML Soln     300 mL    Take 30 mLs by mouth 3 times daily as needed    Constipation, unspecified constipation type       lidocaine 2 % topical gel    XYLOCAINE     Apply 1 Tube topically daily Reported on 4/21/2017        lidocaine visc 2% 2.5mL/5mL & maalox/mylanta w/ simeth 2.5mL/5mL & diphenhydrAMINE 5mg/5mL Susp suspension    [facility]    1 Bottle    Swish and swallow 10 mLs in mouth every 6 hours as needed for mouth sores    Behcet's syndrome (H)       linaclotide 290 MCG capsule    LINZESS    90 capsule    Take 1 capsule (290 mcg) by mouth every morning (before breakfast)    Irritable bowel syndrome       LORazepam 1 MG tablet    ATIVAN    60 tablet    Take 0.5 tablets (0.5 mg) by mouth 2 times daily as needed for other (atypical chest pain) Do not operate a vehicle after taking this medication    Chronic abdominal pain       meclizine 25 MG tablet    ANTIVERT    30 tablet    Take 1 tablet (25 mg) by mouth every 6 hours as needed for dizziness    Nausea       norgestimate-ethinyl estradiol 0.25-35 MG-MCG per tablet    ORTHO-CYCLEN, SPRINTEC    84 tablet    Take 1 tablet by mouth daily     General counseling for prescription of oral contraceptives       omeprazole 40 MG capsule    priLOSEC    90 capsule    Take 1 capsule (40 mg) by mouth daily    Gastroesophageal reflux disease, esophagitis presence not specified       ondansetron 8 MG ODT tab    ZOFRAN-ODT    60 tablet    Take 1 tablet (8 mg) by mouth every 8 hours as needed for nausea    Non-intractable vomiting with nausea, unspecified vomiting type, Hematemesis, presence of nausea not specified       promethazine 25 MG tablet    PHENERGAN    20 tablet    Take 0.5-1 tablets (12.5-25 mg) by mouth every 6 hours as needed for nausea    Intractable chronic migraine without aura and without status migrainosus       sucralfate 1 GM/10ML suspension    CARAFATE    1200 mL    Take 10 mLs (1 g) by mouth 4 times daily    Bile reflux gastritis, Nausea       triamcinolone 0.1 % cream    KENALOG    15 g    Apply sparingly to oral ulcers three times daily for 14 days as needed.    Behcet's syndrome (H)       * Notice:  This list has 2 medication(s) that are the same as other medications prescribed for you. Read the directions carefully, and ask your doctor or other care provider to review them with you.
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
## 2017-11-02 ENCOUNTER — OFFICE VISIT (OUTPATIENT)
Dept: PALLIATIVE MEDICINE | Facility: CLINIC | Age: 22
End: 2017-11-02
Payer: COMMERCIAL

## 2017-11-02 DIAGNOSIS — G89.4 CHRONIC PAIN SYNDROME: Primary | ICD-10-CM

## 2017-11-02 DIAGNOSIS — M79.7 FIBROMYALGIA: ICD-10-CM

## 2017-11-02 PROCEDURE — 96152 HC HEALTH AND BEHAVIOR INTERVENTION, INDIVIDUAL, EACH 15 MINUTES: CPT | Performed by: PSYCHOLOGIST

## 2017-11-02 NOTE — MR AVS SNAPSHOT
After Visit Summary   11/2/2017    Samara Oropeza    MRN: [id number]           Patient Information     Date Of Birth          1995        Visit Information        Provider Department      11/2/2017 2:30 PM Kimo Hudson LP [facility]        Today's Diagnoses     Chronic pain syndrome    -  1    Fibromyalgia           Follow-ups after your visit        Your next 10 appointments already scheduled     Nov 06, 2017  4:00 PM CST   (Arrive by 3:45 PM)   LUIZ For Women Only with Ashtyn Rivas, PT   [facility] ( [facility])    [address]  [address]  [facility] [id number] [phone]            Nov 10, 2017 10:00 AM CST   New Visit with KOURTNEY Avalos   [facility] ([facility]  [address]  [facility] [id number] [phone]            Nov 16, 2017  2:30 PM CST   Return Visit with Kimo Hudson LP   [facility] ([facility])    [address]  [facility] [address] [phone]            Nov 21, 2017 11:00 AM CST   Return Visit with Emily Rollins PT   [facility] ([facility])    [address]  [address]  [facility] [id number] [phone]            Dec 06, 2017  3:00 PM CST   (Arrive by 2:45 PM)   Return Botox with Frederick Bender MD   [facility] ([facility])    [address] [facility]  3rd Floor  [facility] [id number] [phone]            Dec 12, 2017  2:45 PM CST   Return Visit with Cata Hurst NP   [facility] ([facility]    [address]  [address]  [facility] [id number] [phone]            Jan 16, 2018 11:30 AM CST   Return Visit with Austen Marquez MD   [facility]  ([facility])    [address] [address]   [phone]            Jan 17, 2018  2:20 PM CST   (Arrive by 2:05 PM)   New Patient Visit with Estela Cuenca MD   [facility] ([place] [facility])    [address] [facility]  4th [facility] [address] [phone]            Jan 24, 2018  2:00 PM CST   (Arrive by 1:45 PM)   Return Visit with EVI Vizcaino CNP   [facility] ([place] [facility])    [address] [address] [phone]            Feb 13, 2018  2:30 PM CST   (Arrive by 2:15 PM)   Return Seizure with Sigifredo Flores MD   [facility] Neurology ([place] [facility])    [address] [place] [address]  3rd [facility] [id number] [phone]              Who to contact     If you have questions or need follow up information about today's clinic visit or your schedule please contact [facility] directly at [phone].  Normal or non-critical lab and imaging results will be communicated to you by MyChart, letter or phone within 4 business days after the clinic has received the results. If you do not hear from us within 7 days, please contact the clinic through [employer] or phone. If you have a critical or abnormal lab result, we will notify you by phone as soon as possible.  Submit refill requests through [employer] or call your pharmacy and they will forward the refill request to us. Please allow 3 business days for your refill to be completed.          Additional Information About Your Visit        [employer] Information     [employer] gives you secure access to your electronic health record. If you see a primary care provider, you can also send messages to your care team and make appointments. If you have questions, please call your primary care clinic.  If you do not have a primary care  provider, please call [phone] and they will assist you.        Care EveryWhere ID     This is your Care EveryWhere ID. This could be used by other organizations to access your [facility] medical records  [id number]        Your Vitals Were     Last Period                   09/28/2017 (Exact Date)            Blood Pressure from Last 3 Encounters:   11/03/17 121/86   10/24/17 152/90   10/23/17 129/85    Weight from Last 3 Encounters:   11/03/17 66.7 kg (147 lb)   10/24/17 67.6 kg (149 lb)   10/23/17 67.1 kg (148 lb)              We Performed the Following     HEALTH & BEHAVIOR ASSESS, INITIAL, EA 15MIN PHD        Primary Care Provider    None Specified       [address]  [facility] [address]        Equal Access to Services     NABIL GALEANO : Hadii malcolm byerso Sotiffany, waaxda luqadaha, qaybta kaalmada adeegyada, mike rodriguez . So [facility] [phone].    ATENCIÓN: Si habla español, tiene a livingston disposición servicios gratuitos de asistencia lingüística. Llame al [phone].    We comply with applicable federal civil rights laws and [place] laws. We do not discriminate on the basis of race, color, national origin, age, disability, sex, sexual orientation, or gender identity.            Thank you!     Thank you for choosing [facility]  for your care. Our goal is always to provide you with excellent care. Hearing back from our patients is one way we can continue to improve our services. Please take a few minutes to complete the written survey that you may receive in the mail after your visit with us. Thank you!             Your Updated Medication List - Protect others around you: Learn how to safely use, store and throw away your medicines at www.disposemymeds.org.          This list is accurate as of: 11/2/17 11:59 PM.  Always use your most recent med list.                   Brand Name Dispense Instructions for use Diagnosis    albuterol 108 (90 BASE)  MCG/ACT Inhaler    PROAIR HFA/PROVENTIL HFA/VENTOLIN HFA    1 Inhaler    Inhale 2 puffs into the lungs every 6 hours as needed for shortness of breath / dyspnea or wheezing    Atypical chest pain       amitriptyline 50 MG tablet    ELAVIL    30 tablet    Take 1 tablet (50 mg) by mouth At Bedtime    Abdominal pain, generalized, Insomnia due to medical condition       apremilast 30 MG tablet    OTEZLA    60 tablet    20 mg in AM and 30mg in PM daily X 2 weeks and then 30mg twice daily.    Behcet's disease (H)       ASPIRIN CHILDRENS 81 MG chewable tablet   Generic drug:  aspirin      Take 81 mg by mouth as needed        betamethasone valerate 0.1 % cream    VALISONE     Apply topically 2 times daily        * botulinum toxin type A 100 UNITS injection    BOTOX    200 Units    Inject 200 Units into the muscle every 3 months    Cervical dystonia       * ONABOTULINUMTOXINA IJ      Inject 165 Units as directed once Lot# /[id number] Expiration: 04/2020        carbamide peroxide 6.5 % otic solution    DEBROX     5 drops 2 times daily        clobetasol 0.05 % cream    TEMOVATE    60 g    Apply topically 2 times daily    Folliculitis       Colchicine 0.6 MG Caps     90 capsule    Take 0.6 mg by mouth See Admin Instructions 2 capsules in AM and 1 capsule in PM    Behcet's syndrome (H)       COMPOUNDED NON-CONTROLLED SUBSTANCE - PHARMACY TO MIX COMPOUNDED MEDICATION    CMPD RX    30 capsule    Take one capsule in the morning    Fibromyalgia       diclofenac 1 % Gel topical gel    VOLTAREN    100 g    Apply 2-4 grams to affected area(s) up to 4 times per day as needed. This is an anti-inflammatory medication.    Polyarthralgia       dicyclomine 20 MG tablet    BENTYL    60 tablet    Take 1 tablet (20 mg) by mouth 4 times daily as needed    Abdominal cramping       diphenhydrAMINE 25 MG tablet    BENADRYL    30 tablet    Take 1 tablet (25 mg) by mouth every 8 hours as needed for allergies        EPINEPHrine 0.3 MG/0.3ML injection  2-pack    EPIPEN 2-EAMON    0.6 mL    Inject 0.3 mLs (0.3 mg) into the muscle as needed for anaphylaxis    Hx of bee sting allergy       EXCEDRIN MIGRAINE PO      Take by mouth as needed        guanFACINE 1 MG tablet    TENEX    180 tablet    Take 3 tablets (3 mg) by mouth twice daily morning and evening.    Tic       hydrOXYzine 25 MG tablet    ATARAX    60 tablet    Take 1-2 tablets (25-50 mg) by mouth every 6 hours as needed for anxiety    TAHIR (generalized anxiety disorder)       hyoscyamine 0.125 MG tablet    ANASPAZ/LEVSIN    40 tablet    Take 1-2 tablets (125-250 mcg) by mouth every 4 hours as needed for cramping    Abdominal pain, generalized       hypromellose 0.3 % Soln ophthalmic solution    GENTEAL     1 drop every hour as needed        LACTAID PO      Take by mouth daily        lactulose 20 GM/30ML Soln     300 mL    Take 30 mLs by mouth 3 times daily as needed    Constipation, unspecified constipation type       lidocaine 2 % topical gel    XYLOCAINE     Apply 1 Tube topically daily Reported on 4/21/2017        lidocaine visc 2% 2.5mL/5mL & maalox/mylanta w/ simeth 2.5mL/5mL & diphenhydrAMINE 5mg/5mL Susp suspension    [facility] \[facility]""    1 Bottle    Swish and swallow 10 mLs in mouth every 6 hours as needed for mouth sores    Behcet's syndrome (H)       linaclotide 290 MCG capsule    LINZESS    90 capsule    Take 1 capsule (290 mcg) by mouth every morning (before breakfast)    Irritable bowel syndrome       LORazepam 1 MG tablet    ATIVAN    60 tablet    Take 0.5 tablets (0.5 mg) by mouth 2 times daily as needed for other (atypical chest pain) Do not operate a vehicle after taking this medication    Chronic abdominal pain       meclizine 25 MG tablet    ANTIVERT    30 tablet    Take 1 tablet (25 mg) by mouth every 6 hours as needed for dizziness    Nausea       norgestimate-ethinyl estradiol 0.25-35 MG-MCG per tablet    ORTHO-CYCLEN, SPRINTEC    84 tablet    Take 1 tablet by mouth daily    General  counseling for prescription of oral contraceptives       omeprazole 40 MG capsule    priLOSEC    90 capsule    Take 1 capsule (40 mg) by mouth daily    Gastroesophageal reflux disease, esophagitis presence not specified       ondansetron 8 MG ODT tab    ZOFRAN-ODT    60 tablet    Take 1 tablet (8 mg) by mouth every 8 hours as needed for nausea    Non-intractable vomiting with nausea, unspecified vomiting type, Hematemesis, presence of nausea not specified       promethazine 25 MG tablet    PHENERGAN    20 tablet    Take 0.5-1 tablets (12.5-25 mg) by mouth every 6 hours as needed for nausea    Intractable chronic migraine without aura and without status migrainosus       sucralfate 1 GM/10ML suspension    CARAFATE    1200 mL    Take 10 mLs (1 g) by mouth 4 times daily    Bile reflux gastritis, Nausea       triamcinolone 0.1 % cream    KENALOG    15 g    Apply sparingly to oral ulcers three times daily for 14 days as needed.    Behcet's syndrome (H)       * Notice:  This list has 2 medication(s) that are the same as other medications prescribed for you. Read the directions carefully, and ask your doctor or other care provider to review them with you.

## 2017-11-03 ENCOUNTER — APPOINTMENT (OUTPATIENT)
Dept: CT IMAGING | Facility: CLINIC | Age: 22
End: 2017-11-03
Attending: EMERGENCY MEDICINE
Payer: COMMERCIAL

## 2017-11-03 ENCOUNTER — APPOINTMENT (OUTPATIENT)
Dept: ULTRASOUND IMAGING | Facility: CLINIC | Age: 22
End: 2017-11-03
Attending: EMERGENCY MEDICINE
Payer: COMMERCIAL

## 2017-11-03 ENCOUNTER — HOSPITAL ENCOUNTER (EMERGENCY)
Facility: CLINIC | Age: 22
Discharge: HOME | End: 2017-11-03
Attending: EMERGENCY MEDICINE | Admitting: EMERGENCY MEDICINE
Payer: COMMERCIAL

## 2017-11-03 VITALS
TEMPERATURE: 98 F | OXYGEN SATURATION: 97 % | SYSTOLIC BLOOD PRESSURE: 121 MMHG | WEIGHT: 147 LBS | RESPIRATION RATE: 16 BRPM | DIASTOLIC BLOOD PRESSURE: 89 MMHG | HEIGHT: 63 IN | BODY MASS INDEX: 26.05 KG/M2

## 2017-11-03 DIAGNOSIS — R10.84 ABDOMINAL PAIN, GENERALIZED: ICD-10-CM

## 2017-11-03 DIAGNOSIS — Q43.3 MOBILE CECUM (H): ICD-10-CM

## 2017-11-03 DIAGNOSIS — K62.5 RECTAL BLEEDING: ICD-10-CM

## 2017-11-03 LAB
ALBUMIN SERPL-MCNC: 4 G/DL (ref 3.4–5)
ALBUMIN UR-MCNC: NEGATIVE MG/DL
ALP SERPL-CCNC: 99 U/L (ref 40–150)
ALT SERPL W P-5'-P-CCNC: 40 U/L (ref 0–50)
ANION GAP SERPL CALCULATED.3IONS-SCNC: 7 MMOL/L (ref 3–14)
APPEARANCE UR: CLEAR
AST SERPL W P-5'-P-CCNC: 34 U/L (ref 0–45)
BASOPHILS # BLD AUTO: 0 10E9/L (ref 0–0.2)
BASOPHILS NFR BLD AUTO: 0.2 %
BILIRUB SERPL-MCNC: 0.3 MG/DL (ref 0.2–1.3)
BILIRUB UR QL STRIP: NEGATIVE
BUN SERPL-MCNC: 10 MG/DL (ref 7–30)
CALCIUM SERPL-MCNC: 9.4 MG/DL (ref 8.5–10.1)
CHLORIDE SERPL-SCNC: 106 MMOL/L (ref 94–109)
CO2 SERPL-SCNC: 27 MMOL/L (ref 20–32)
COLOR UR AUTO: ABNORMAL
CREAT SERPL-MCNC: 0.78 MG/DL (ref 0.52–1.04)
DIFFERENTIAL METHOD BLD: NORMAL
EOSINOPHIL # BLD AUTO: 0.1 10E9/L (ref 0–0.7)
EOSINOPHIL NFR BLD AUTO: 2.2 %
ERYTHROCYTE [DISTWIDTH] IN BLOOD BY AUTOMATED COUNT: 13.1 % (ref 10–15)
GFR SERPL CREATININE-BSD FRML MDRD: >90 ML/MIN/1.7M2
GLUCOSE SERPL-MCNC: 82 MG/DL (ref 70–99)
GLUCOSE UR STRIP-MCNC: NEGATIVE MG/DL
HCG UR QL: NEGATIVE
HCT VFR BLD AUTO: 40.7 % (ref 35–47)
HGB BLD-MCNC: 13.4 G/DL (ref 11.7–15.7)
HGB UR QL STRIP: NEGATIVE
IMM GRANULOCYTES # BLD: 0 10E9/L (ref 0–0.4)
IMM GRANULOCYTES NFR BLD: 0.2 %
INTERNAL QC OK POCT: YES
KETONES UR STRIP-MCNC: NEGATIVE MG/DL
LEUKOCYTE ESTERASE UR QL STRIP: NEGATIVE
LIPASE SERPL-CCNC: 175 U/L (ref 73–393)
LYMPHOCYTES # BLD AUTO: 2 10E9/L (ref 0.8–5.3)
LYMPHOCYTES NFR BLD AUTO: 37.2 %
MCH RBC QN AUTO: 29.2 PG (ref 26.5–33)
MCHC RBC AUTO-ENTMCNC: 32.9 G/DL (ref 31.5–36.5)
MCV RBC AUTO: 89 FL (ref 78–100)
MONOCYTES # BLD AUTO: 0.3 10E9/L (ref 0–1.3)
MONOCYTES NFR BLD AUTO: 5 %
NEUTROPHILS # BLD AUTO: 3 10E9/L (ref 1.6–8.3)
NEUTROPHILS NFR BLD AUTO: 55.2 %
NITRATE UR QL: NEGATIVE
NRBC # BLD AUTO: 0 10*3/UL
NRBC BLD AUTO-RTO: 0 /100
PH UR STRIP: 7.5 PH (ref 5–7)
PLATELET # BLD AUTO: 251 10E9/L (ref 150–450)
POTASSIUM SERPL-SCNC: 3.8 MMOL/L (ref 3.4–5.3)
PROT SERPL-MCNC: 7.7 G/DL (ref 6.8–8.8)
RBC # BLD AUTO: 4.59 10E12/L (ref 3.8–5.2)
SODIUM SERPL-SCNC: 140 MMOL/L (ref 133–144)
SOURCE: ABNORMAL
SP GR UR STRIP: 1.01 (ref 1–1.03)
UROBILINOGEN UR STRIP-MCNC: NORMAL MG/DL (ref 0–2)
WBC # BLD AUTO: 5.4 10E9/L (ref 4–11)

## 2017-11-03 PROCEDURE — 96374 THER/PROPH/DIAG INJ IV PUSH: CPT | Mod: 59 | Performed by: EMERGENCY MEDICINE

## 2017-11-03 PROCEDURE — 25000128 H RX IP 250 OP 636: Performed by: EMERGENCY MEDICINE

## 2017-11-03 PROCEDURE — 81025 URINE PREGNANCY TEST: CPT | Performed by: EMERGENCY MEDICINE

## 2017-11-03 PROCEDURE — 74176 CT ABD & PELVIS W/O CONTRAST: CPT

## 2017-11-03 PROCEDURE — 96361 HYDRATE IV INFUSION ADD-ON: CPT | Performed by: EMERGENCY MEDICINE

## 2017-11-03 PROCEDURE — 80053 COMPREHEN METABOLIC PANEL: CPT | Performed by: EMERGENCY MEDICINE

## 2017-11-03 PROCEDURE — 85025 COMPLETE CBC W/AUTO DIFF WBC: CPT | Performed by: EMERGENCY MEDICINE

## 2017-11-03 PROCEDURE — 83690 ASSAY OF LIPASE: CPT | Performed by: EMERGENCY MEDICINE

## 2017-11-03 PROCEDURE — 99285 EMERGENCY DEPT VISIT HI MDM: CPT | Mod: 25 | Performed by: EMERGENCY MEDICINE

## 2017-11-03 PROCEDURE — 81003 URINALYSIS AUTO W/O SCOPE: CPT | Performed by: EMERGENCY MEDICINE

## 2017-11-03 PROCEDURE — 25000128 H RX IP 250 OP 636: Performed by: STUDENT IN AN ORGANIZED HEALTH CARE EDUCATION/TRAINING PROGRAM

## 2017-11-03 PROCEDURE — 99284 EMERGENCY DEPT VISIT MOD MDM: CPT | Mod: Z6 | Performed by: EMERGENCY MEDICINE

## 2017-11-03 PROCEDURE — 93976 VASCULAR STUDY: CPT

## 2017-11-03 RX ORDER — LORAZEPAM 2 MG/ML
1 INJECTION INTRAMUSCULAR ONCE
Status: COMPLETED | OUTPATIENT
Start: 2017-11-03 | End: 2017-11-03

## 2017-11-03 RX ORDER — LIDOCAINE 40 MG/G
CREAM TOPICAL
Status: DISCONTINUED
Start: 2017-11-03 | End: 2017-11-03 | Stop reason: HOSPADM

## 2017-11-03 RX ADMIN — LORAZEPAM 1 MG: 2 INJECTION INTRAMUSCULAR; INTRAVENOUS at 11:46

## 2017-11-03 RX ADMIN — SODIUM CHLORIDE 1000 ML: 9 INJECTION, SOLUTION INTRAVENOUS at 11:18

## 2017-11-03 ASSESSMENT — ENCOUNTER SYMPTOMS
DYSURIA: 0
MYALGIAS: 1
ANAL BLEEDING: 1
COUGH: 0
RECTAL PAIN: 1
NERVOUS/ANXIOUS: 1
NECK STIFFNESS: 0
HEADACHES: 0
COLOR CHANGE: 0
ABDOMINAL DISTENTION: 1
ABDOMINAL PAIN: 1
ARTHRALGIAS: 0
CONFUSION: 0
CONSTIPATION: 1
NAUSEA: 1
WHEEZING: 0
BLOOD IN STOOL: 1
DIARRHEA: 1
WEAKNESS: 0
PALPITATIONS: 0
SHORTNESS OF BREATH: 0
VOMITING: 0
FATIGUE: 1
EYE REDNESS: 0
DIFFICULTY URINATING: 0
FEVER: 1
CHILLS: 1

## 2017-11-03 NOTE — DISCHARGE INSTRUCTIONS
Please make an appointment to follow up with GI Clinic (phone: [phone]) within 7 days.  You may need another colonoscopy.  Use Tucks medicated pads to the rectal area for pain.  Increase your fiber intake

## 2017-11-03 NOTE — PROGRESS NOTES
[facility]   [facility], [place]  Behavioral Medicine Visit    Patient Name: Samara Oropeza    YOB: 1995   Medical Record Number: [id number]  Date: 11/3/2017                SUBJECTIVE: Met with the patient on this date for an elective return appointment. Today the patient reports the following: Her pain level is the same, her mood remains the same, her activity level is mildly increased, her stress level is mildly worse, and her sleep remains the same.    Today the patient discusses present stressors in family with their continued ridiculing of her somatic complaints. The patient appears to be tolerant and accustomed to their disbelief. The patient appears to be at a loss about how to respond differently in these situations.    The patient is moving into her new apartment tomorrow. She is hopeful that this move will alleviate some of her distress and subsequently be useful for her pain management.          OBJECTIVE:   Length of Visit: 60 minutes      Assessment: Current Emotional / Mental Status    Appearance:   Appropriate   Eye Contact:   Fair   Psychomotor Behavior: Normal   Attitude:   Cooperative   Orientation:   All  Speech  Rate / Production:             Normal   Volume:              Normal   Mood:    Anxious  Depressed   Affect:    Flat  Subdued   Thought Content:  Clear   Thought Form:  Coherent  Logical   Insight:    Fair     ASSESSMENT:   Axis I: Chronic pain syndrome    Progress toward goals: fair.    Pain Status: unchanged    Emotional Status: improved              Medication / chemical use concerns:  None    PLAN:   Next Appointment: Samara Oropeza will schedule a follow-up appointment in 3 weeks.  Assignment: Continue work on relaxation and self calming skills  Objectives / interventions for next session: Continue above    Kimo Hudson MA, PAULETTE, [facility]  Behavioral Pain Specialist  [place]  [facility]

## 2017-11-03 NOTE — ED PROVIDER NOTES
History     Chief Complaint   Patient presents with     Abdominal Pain     Rectal Bleeding     The history is provided by the patient.     Samara Oropeza is a 22 year old female with PMH of Behcet's with vaginal lesions, unspecified seizures, chronic pain, who presents with abdominal pain and rectal bleeding. She reports Monday (4 days ago) after straining on the toilet for 1 hour she began to notice bright red blood in her hard stool. She endorses anal tenderness with bowel movements and some relief of her abdominal pain after bowel movements. She admits to a remote history of hemorrhoids (when she was 5 years old), but denies any rectal bleeding since then. She reports over the last day her pain has become more significant 7/10 radiating from her RLQ with a constant, sharp, cramping, twisting quality. Since yesterday she has had significant diarrhea (soft not watery) with more volume of blood dripping into toilet afterwards. Still with anal tenderness. Hemorrhoid cream has not helped with the anal tenderness. Has not taken OTC pain medications. Pt reports FDLMP 10/29 still currently menstruating and cramps although reports this pain is different. Pt endorses baseline fever, chills, nausea controlled with Zofran, poor appetite on supplemental shakes and atypical chest pain on Ativan. Pt denies shortness of breath or dysuria.    Social: here with boyfriend    I have reviewed the Medications, Allergies, Past Medical and Surgical History, and Social History in the [employer] system.  Past Medical History:   Diagnosis Date     Anxiety      Arthritis      Behcet's disease (H)      Cervical adenitis May 2010     Chronic abdominal pain      Constipation, chronic 1994     Gastro-oesophageal reflux disease      Gastroparesis      Migraines      Neuromuscular disorder (H)     fibramyalgia     Palpitations      Seizure (H)      Seizures (H)     unknown etiology     Syncope      Tourette's        Past Surgical History:    Procedure Laterality Date     ARTHROSCOPY KNEE WITH PATELLAR REALIGNMENT  7/25/2013    Procedure: ARTHROSCOPY KNEE WITH PATELLAR REALIGNMENT;  Left Knee Arthroscopy, Medial Patellofemoral Ligament Reconstruction with Allograft  ;  Surgeon: Jennifer Acevedo MD;  Location: [facility]     COLONOSCOPY  2015     DENTAL SURGERY  1996    Teeth removal     ENDOSCOPY UPPER, COLONOSCOPY, COMBINED  2005     HC ESOPH/GAS REFLUX TEST W NASAL IMPED >1 HR N/A 2/15/2017    Procedure: ESOPHAGEAL IMPEDENCE FUNCTION TEST WITH 24 HOUR PH GREATER THAN 1 HOUR;  Surgeon: Timothy Matta MD;  Location:  GI       Family History   Problem Relation Age of Onset     Depression Mother      Neurologic Disorder Mother      Migraines, take imitrex injection.  Also in maternal grandmother.       Alcohol/Drug Father      Hypertension Father      Depression Father      Cardiovascular Maternal Grandmother      Depression Maternal Grandmother      Hypertension Maternal Grandmother      Alzheimer Disease Maternal Grandmother      Cardiovascular Maternal Grandfather      Hypertension Maternal Grandfather      Depression Maternal Grandfather      Alcohol/Drug Maternal Grandfather      Cardiovascular Paternal Grandmother      Hypertension Paternal Grandmother      Cardiovascular Paternal Grandfather      Hypertension Paternal Grandfather      Glaucoma No family hx of      Macular Degeneration No family hx of        Social History   Substance Use Topics     Smoking status: Never Smoker     Smokeless tobacco: Never Used     Alcohol use 0.6 oz/week     1 Standard drinks or equivalent per week      Comment: MONTHLY     Current Facility-Administered Medications   Medication     lidocaine (LMX4) 4 % kit     Current Outpatient Prescriptions   Medication     hydrOXYzine (ATARAX) 25 MG tablet     omeprazole (PRILOSEC) 40 MG capsule     COMPOUNDED NON-CONTROLLED SUBSTANCE (CMPD RX) - PHARMACY TO MIX COMPOUNDED MEDICATION     amitriptyline (ELAVIL) 50 MG  tablet     lactulose 20 GM/30ML SOLN     LORazepam (ATIVAN) 1 MG tablet     guanFACINE (TENEX) 1 MG tablet     ondansetron (ZOFRAN-ODT) 8 MG ODT tab     ONABOTULINUMTOXINA IJ     Colchicine 0.6 MG CAPS     diphenhydrAMINE (BENADRYL) 25 MG tablet     linaclotide (LINZESS) 290 MCG capsule     sucralfate (CARAFATE) 1 GM/10ML suspension     diclofenac (VOLTAREN) 1 % GEL topical gel     aspirin (ASPIRIN CHILDRENS) 81 MG chewable tablet     dicyclomine (BENTYL) 20 MG tablet     EPINEPHrine (EPIPEN 2-EAMON) 0.3 MG/0.3ML injection     apremilast (OTEZLA) 30 MG tablet     norgestimate-ethinyl estradiol (ORTHO-CYCLEN, SPRINTEC) 0.25-35 MG-MCG per tablet     albuterol (PROAIR HFA/PROVENTIL HFA/VENTOLIN HFA) 108 (90 BASE) MCG/ACT Inhaler     promethazine (PHENERGAN) 25 MG tablet     meclizine (ANTIVERT) 25 MG tablet     Magic Mouthwash (FV std formula) lidocaine visc 2% 2.5mL/5mL & maalox/mylanta w/ simeth 2.5mL/5mL & diphenhydrAMINE 5mg/5mL     clobetasol (TEMOVATE) 0.05 % cream     botulinum toxin type A (BOTOX) 100 UNITS injection     hyoscyamine (ANASPAZ,LEVSIN) 0.125 MG tablet     Aspirin-Acetaminophen-Caffeine (EXCEDRIN MIGRAINE PO)     hypromellose (GENTEAL) 0.3 % SOLN     betamethasone valerate (VALISONE) 0.1 % cream     carbamide peroxide (DEBROX) 6.5 % otic solution     Lactase (LACTAID PO)     lidocaine (XYLOCAINE) 2 % jelly     triamcinolone (KENALOG) 0.1 % cream       Review of Systems   Constitutional: Positive for chills, fatigue and fever.   HENT: Negative for congestion and sneezing.    Eyes: Negative for redness.   Respiratory: Negative for cough, shortness of breath and wheezing.    Cardiovascular: Positive for chest pain. Negative for palpitations.   Gastrointestinal: Positive for abdominal distention, abdominal pain, anal bleeding, blood in stool, constipation, diarrhea, nausea and rectal pain. Negative for vomiting.   Genitourinary: Positive for vaginal bleeding. Negative for difficulty urinating and  "dysuria.   Musculoskeletal: Positive for myalgias. Negative for arthralgias and neck stiffness.   Skin: Negative for color change.   Neurological: Negative for weakness and headaches.   Psychiatric/Behavioral: Negative for confusion. The patient is nervous/anxious.    All other systems reviewed and are negative.      Physical Exam   BP: (!) 135/91  Heart Rate: 106  Temp: 98  F (36.7  C)  Resp: 16  Height: 160 cm (5' 3\")  Weight: 66.7 kg (147 lb)  SpO2: 100 %      Physical Exam   Constitutional: She is oriented to person, place, and time. She appears well-developed and well-nourished. No distress.   Lying comfortably with boyfriend at bedside   HENT:   Head: Atraumatic.   Mouth/Throat: Oropharynx is clear and moist. No oropharyngeal exudate.   Eyes: Pupils are equal, round, and reactive to light. No scleral icterus.   Neck: Normal range of motion.   Cardiovascular: Regular rhythm, normal heart sounds and intact distal pulses.  Tachycardia present.    Pulmonary/Chest: Effort normal and breath sounds normal. No respiratory distress. She has no wheezes. She has no rales. She exhibits tenderness.   Abdominal: Soft. Bowel sounds are normal. She exhibits no distension. There is tenderness in the right lower quadrant and suprapubic area. There is no rigidity, no guarding and negative Whitehead's sign.   Genitourinary: Rectal exam shows internal hemorrhoid, tenderness and guaiac positive stool. Rectal exam shows no external hemorrhoid, no fissure and anal tone normal.   Musculoskeletal: She exhibits no edema or tenderness.   Neurological: She is alert and oriented to person, place, and time.   Skin: Skin is warm. No rash noted. She is not diaphoretic.   Psychiatric: Thought content normal. Her mood appears anxious.   Nursing note and vitals reviewed.      ED Course     ED Course     Procedures         Medications   lidocaine (LMX4) 4 % kit (not administered)   0.9% sodium chloride BOLUS (1,000 mLs Intravenous Restarted 11/3/17 " 1330)   LORazepam (ATIVAN) injection 1 mg (1 mg Intravenous Given 11/3/17 1146)       Critical Care time:  none         Results for orders placed or performed during the hospital encounter of 11/03/17 (from the past 24 hour(s))   CBC with platelets differential   Result Value Ref Range    WBC 5.4 4.0 - 11.0 10e9/L    RBC Count 4.59 3.8 - 5.2 10e12/L    Hemoglobin 13.4 11.7 - 15.7 g/dL    Hematocrit 40.7 35.0 - 47.0 %    MCV 89 78 - 100 fl    MCH 29.2 26.5 - 33.0 pg    MCHC 32.9 31.5 - 36.5 g/dL    RDW 13.1 10.0 - 15.0 %    Platelet Count 251 150 - 450 10e9/L    Diff Method Automated Method     % Neutrophils 55.2 %    % Lymphocytes 37.2 %    % Monocytes 5.0 %    % Eosinophils 2.2 %    % Basophils 0.2 %    % Immature Granulocytes 0.2 %    Nucleated RBCs 0 0 /100    Absolute Neutrophil 3.0 1.6 - 8.3 10e9/L    Absolute Lymphocytes 2.0 0.8 - 5.3 10e9/L    Absolute Monocytes 0.3 0.0 - 1.3 10e9/L    Absolute Eosinophils 0.1 0.0 - 0.7 10e9/L    Absolute Basophils 0.0 0.0 - 0.2 10e9/L    Abs Immature Granulocytes 0.0 0 - 0.4 10e9/L    Absolute Nucleated RBC 0.0    Comprehensive metabolic panel   Result Value Ref Range    Sodium 140 133 - 144 mmol/L    Potassium 3.8 3.4 - 5.3 mmol/L    Chloride 106 94 - 109 mmol/L    Carbon Dioxide 27 20 - 32 mmol/L    Anion Gap 7 3 - 14 mmol/L    Glucose 82 70 - 99 mg/dL    Urea Nitrogen 10 7 - 30 mg/dL    Creatinine 0.78 0.52 - 1.04 mg/dL    GFR Estimate >90 >60 mL/min/1.7m2    GFR Estimate If Black >90 >60 mL/min/1.7m2    Calcium 9.4 8.5 - 10.1 mg/dL    Bilirubin Total 0.3 0.2 - 1.3 mg/dL    Albumin 4.0 3.4 - 5.0 g/dL    Protein Total 7.7 6.8 - 8.8 g/dL    Alkaline Phosphatase 99 40 - 150 U/L    ALT 40 0 - 50 U/L    AST 34 0 - 45 U/L   Lipase   Result Value Ref Range    Lipase 175 73 - 393 U/L   US Pelvic Complete w Transvaginal & Abd/Pel Duplex Limited    Narrative    EXAMINATION: Ultrasound pelvis complete, 11/3/2017 12:12 PM     COMPARISON: 2/16/2015    HISTORY: Pelvic  pain    TECHNIQUE: The pelvis was scanned in standard fashion with  transabdominal and transvaginal transducer(s) using both grey scale  and color Doppler techniques.    FINDINGS  The uterus is retroverted, measuring 5.2 x 3.3 x 4.6 cm, and there is  no evidence of a focal fibroid.  The endometrium is within normal  limits and measures 2 mm. There is no free fluid in the pelvis.    The right ovary measures 3.5 x 3.8 x 2.0 cm and the left ovary  measures 4.2 x 2.5 x 2.0 cm. There is no adnexal mass. There is normal  blood flow to the ovaries.      Impression    IMPRESSION: Normal pelvic ultrasound. No features of ovarian torsion.    I have personally reviewed the examination and initial interpretation  and I agree with the findings.    ACACIA CAO MD   UA reflex to Microscopic and Culture   Result Value Ref Range    Color Urine Light Yellow     Appearance Urine Clear     Glucose Urine Negative NEG^Negative mg/dL    Bilirubin Urine Negative NEG^Negative    Ketones Urine Negative NEG^Negative mg/dL    Specific Gravity Urine 1.007 1.003 - 1.035    Blood Urine Negative NEG^Negative    pH Urine 7.5 (H) 5.0 - 7.0 pH    Protein Albumin Urine Negative NEG^Negative mg/dL    Urobilinogen mg/dL Normal 0.0 - 2.0 mg/dL    Nitrite Urine Negative NEG^Negative    Leukocyte Esterase Urine Negative NEG^Negative    Source Midstream Urine    hCG qual urine POCT   Result Value Ref Range    HCG Qual Urine Negative neg    Internal QC OK Yes    CT Abdomen Pelvis w/o Contrast    Narrative    Preliminary report: mobile cecum, which may be incidental or cause  chronic RLQ pain, often relieved post defecation.           Labs Ordered and Resulted from Time of ED Arrival Up to the Time of Departure from the ED   UA MACROSCOPIC WITH REFLEX TO MICRO AND CULTURE - Abnormal; Notable for the following:        Result Value    pH Urine 7.5 (*)     All other components within normal limits   HCG QUAL URINE POCT - Normal   CBC WITH PLATELETS  DIFFERENTIAL   COMPREHENSIVE METABOLIC PANEL   LIPASE           Results for orders placed or performed during the hospital encounter of 11/03/17 (from the past 24 hour(s))   CBC with platelets differential   Result Value Ref Range    WBC 5.4 4.0 - 11.0 10e9/L    RBC Count 4.59 3.8 - 5.2 10e12/L    Hemoglobin 13.4 11.7 - 15.7 g/dL    Hematocrit 40.7 35.0 - 47.0 %    MCV 89 78 - 100 fl    MCH 29.2 26.5 - 33.0 pg    MCHC 32.9 31.5 - 36.5 g/dL    RDW 13.1 10.0 - 15.0 %    Platelet Count 251 150 - 450 10e9/L    Diff Method Automated Method     % Neutrophils 55.2 %    % Lymphocytes 37.2 %    % Monocytes 5.0 %    % Eosinophils 2.2 %    % Basophils 0.2 %    % Immature Granulocytes 0.2 %    Nucleated RBCs 0 0 /100    Absolute Neutrophil 3.0 1.6 - 8.3 10e9/L    Absolute Lymphocytes 2.0 0.8 - 5.3 10e9/L    Absolute Monocytes 0.3 0.0 - 1.3 10e9/L    Absolute Eosinophils 0.1 0.0 - 0.7 10e9/L    Absolute Basophils 0.0 0.0 - 0.2 10e9/L    Abs Immature Granulocytes 0.0 0 - 0.4 10e9/L    Absolute Nucleated RBC 0.0    Comprehensive metabolic panel   Result Value Ref Range    Sodium 140 133 - 144 mmol/L    Potassium 3.8 3.4 - 5.3 mmol/L    Chloride 106 94 - 109 mmol/L    Carbon Dioxide 27 20 - 32 mmol/L    Anion Gap 7 3 - 14 mmol/L    Glucose 82 70 - 99 mg/dL    Urea Nitrogen 10 7 - 30 mg/dL    Creatinine 0.78 0.52 - 1.04 mg/dL    GFR Estimate >90 >60 mL/min/1.7m2    GFR Estimate If Black >90 >60 mL/min/1.7m2    Calcium 9.4 8.5 - 10.1 mg/dL    Bilirubin Total 0.3 0.2 - 1.3 mg/dL    Albumin 4.0 3.4 - 5.0 g/dL    Protein Total 7.7 6.8 - 8.8 g/dL    Alkaline Phosphatase 99 40 - 150 U/L    ALT 40 0 - 50 U/L    AST 34 0 - 45 U/L   Lipase   Result Value Ref Range    Lipase 175 73 - 393 U/L   US Pelvic Complete w Transvaginal & Abd/Pel Duplex Limited    Narrative    EXAMINATION: Ultrasound pelvis complete, 11/3/2017 12:12 PM     COMPARISON: 2/16/2015    HISTORY: Pelvic pain    TECHNIQUE: The pelvis was scanned in standard fashion  with  transabdominal and transvaginal transducer(s) using both grey scale  and color Doppler techniques.    FINDINGS  The uterus measures 5.2 x 3.3 x 4.6 cm, and there is no evidence of a  focal fibroid.  The endometrium is within normal limits and measures 2  mm. There is no free fluid in the pelvis.    The right ovary measures 3.5 x 3.8 x 2.0 cm and the left ovary  measures 4.2 x 2.5 x 2.0 cm. There is no adnexal mass. There is normal  blood flow to the ovaries.      Impression    IMPRESSION: Normal pelvic ultrasound.   UA reflex to Microscopic and Culture   Result Value Ref Range    Color Urine Light Yellow     Appearance Urine Clear     Glucose Urine Negative NEG^Negative mg/dL    Bilirubin Urine Negative NEG^Negative    Ketones Urine Negative NEG^Negative mg/dL    Specific Gravity Urine 1.007 1.003 - 1.035    Blood Urine Negative NEG^Negative    pH Urine 7.5 (H) 5.0 - 7.0 pH    Protein Albumin Urine Negative NEG^Negative mg/dL    Urobilinogen mg/dL Normal 0.0 - 2.0 mg/dL    Nitrite Urine Negative NEG^Negative    Leukocyte Esterase Urine Negative NEG^Negative    Source Midstream Urine    hCG qual urine POCT   Result Value Ref Range    HCG Qual Urine Negative neg    Internal QC OK Yes    CT Abdomen Pelvis w/o Contrast    Narrative    Preliminary report: mobile cecum, which may be incidental or cause  chronic RLQ pain, often relieved post defecation.         Assessments & Plan (with Medical Decision Making)   Ms. Oropeza was evaluated today for abdominal pain and rectal bleeding. Pertinent exam findings with RLQ tenderness with no guarding. Rectal exam with no gross bleeding, no visible fissures or external hemorrhoids. Significant anal tenderness on exam. Stool is guaiac positive. Hgb is stable with no anemia. CMP unremarkable. Pelvic ultrasound normal. CT abdomen with mobile cecum. Discussed CT findings with GI with no additional recommendations.  Will recommend increase fiber intake for constipation and OTC  "stool softeners as needed. Given Tucks medicated pads for relief of rectal pain. Discharge with recommendation for GI clinic follow up for possible colonoscopy.    I have reviewed the nursing notes.    I have reviewed the findings, diagnosis, plan and need for follow up with the patient.    New Prescriptions    No medications on file       Final diagnoses:   Abdominal pain, generalized   Rectal bleeding   Mobile cecum       11/3/2017   Shantell Grey, PGY-1  [facility], [place], EMERGENCY DEPARTMENT  ATTENDING NOTE: The patient was seen with Dr. Pascual, resident. I was personally and directly involved in patient care including obtaining the history, performing the physical exam, ordering and reviewing of laboratory tests, and decision-making process. Plan of care was discussed with the patient. Samara Oropeza is a 22 year old female with a  PMHx of Behcet's and RA, here with abdominal pain and rectal bleeding since Monday, 4 days ago. She has a poor appetite and hasn't eaten this morning. Her pain is diffuse but starts in the RLQ and radiates throughout her abdomen.  Patient has chronic nausea and is on Zofran at home.  Social: The patient is here with her boyfriend  Physical Exam:  BP (!) 135/91  Temp 98  F (36.7  C) (Oral)  Resp 16  Ht 1.6 m (5' 3\")  Wt 66.7 kg (147 lb)  LMP 10/29/2017  SpO2 100%  BMI 26.04 kg/m2   General alert and oriented ×3 in no apparent distress  CV: RRR s MGR  Lungs:  CTA B  Abdomen: Tender to palpation in the right lower quadrant below McBurney's point with no guarding or rebound  Back: No bony or CVA tenderness  Rectal:  Good tone, no appreciable hemorrhoids, guaiac positive.  Emergency Department course:  The patient was seen and examined at 1040 am.  Laboratory studies, including a CBC, comprehensive metabolic panel, and lipase are all unremarkable.  A pelvic ultrasound was ordered.  The patient states she was very anxious and she received Ativan prior to the pelvic " ultrasound.  She is on Ativan daily and chronically.  Pelvic ultrasound is unremarkable and was read as normal.  Given the patient's history of Behcet's, we did order an abdominal and pelvic CT scan. This shows :  Preliminary report: mobile cecum, which may be incidental or cause  chronic RLQ pain, often relieved post defecation.  We consulted GI, who had no additional recommendations.  The patient is a 22-year-old female with Behcet's who presents with abdominal pain and guaiac positive stool.  She is currently on her menstrual cycle and is having abdominal cramping as well.  Laboratory and radiographic studies are unremarkable.  The patient was discharged home with Tucks medicated pads for relief of rectal pain.  She should follow up with her GI physician within a week.  An outpatient colonoscopy may be warranted. I counseled the patient in my usual and standard fashion for abdominal pain and rectal bleeding and reasons to return to the Emergency Department.       Mimi Stanford MD.      Mimi Stanford MD  11/03/17 [id number]

## 2017-11-03 NOTE — ED NOTES
Samara presents to the ED complaining of abdominal pain and rectal bleeding since Monday. She reports increased pain and bleeding this morning. She has a history of constipation and abdominal pain and is currently menstruating.

## 2017-11-03 NOTE — ED AVS SNAPSHOT
[facility], [place], Emergency Department    [address] [place] [id number]    Phone:  [phone]                                       Samara Oropeza   MRN: [id number]    Department:  [facility], Emergency Department   Date of Visit:  11/3/2017           After Visit Summary Signature Page     I have received my discharge instructions, and my questions have been answered. I have discussed any challenges I see with this plan with the nurse or doctor.    ..........................................................................................................................................  Patient/Patient Representative Signature      ..........................................................................................................................................  Patient Representative Print Name and Relationship to Patient    ..................................................               ................................................  Date                                            Time    ..........................................................................................................................................  Reviewed by Signature/Title    ...................................................              ..............................................  Date                                                            Time

## 2017-11-06 ENCOUNTER — CARE COORDINATION (OUTPATIENT)
Dept: CARE COORDINATION | Facility: CLINIC | Age: 22
End: 2017-11-06

## 2017-11-06 NOTE — PROGRESS NOTES
Clinic Care Coordination Contact  [facility]/Voicemail    Referral Source: PCP  Clinical Data: Care Coordinator Outreach  Outreach attempted x 1.  Left message on voicemail with call back information and requested return call.  Plan:Care Coordinator will do no further outreaches at this time.  ADRIANA Ravi    Care Coordinator  [facility]   [phone]

## 2017-11-07 ENCOUNTER — TELEPHONE (OUTPATIENT)
Dept: GASTROENTEROLOGY | Facility: CLINIC | Age: 22
End: 2017-11-07

## 2017-11-07 NOTE — TELEPHONE ENCOUNTER
ccalled after review of ER visit. Left message: how is she now?  Symptoms consistent with fissure, even though none seen.  If anal symptoms persist, would send prescription for diltiazem cream .

## 2017-11-09 ENCOUNTER — OFFICE VISIT (OUTPATIENT)
Dept: FAMILY MEDICINE | Facility: CLINIC | Age: 22
End: 2017-11-09
Payer: COMMERCIAL

## 2017-11-09 VITALS
WEIGHT: 146 LBS | SYSTOLIC BLOOD PRESSURE: 114 MMHG | BODY MASS INDEX: 25.86 KG/M2 | TEMPERATURE: 98 F | DIASTOLIC BLOOD PRESSURE: 82 MMHG | RESPIRATION RATE: 14 BRPM | OXYGEN SATURATION: 98 % | HEART RATE: 121 BPM

## 2017-11-09 DIAGNOSIS — F95.9 TIC: ICD-10-CM

## 2017-11-09 DIAGNOSIS — Q43.3 MOBILE CECUM (H): ICD-10-CM

## 2017-11-09 PROCEDURE — 99213 OFFICE O/P EST LOW 20 MIN: CPT | Performed by: NURSE PRACTITIONER

## 2017-11-09 RX ORDER — GUANFACINE 1 MG/1
3 TABLET ORAL 2 TIMES DAILY
Qty: 180 TABLET | Refills: 3 | Status: SHIPPED | OUTPATIENT
Start: 2017-11-09 | End: 2017-11-15

## 2017-11-09 NOTE — MR AVS SNAPSHOT
After Visit Summary   11/9/2017    Samara Oropeza    MRN: [id number]           Patient Information     Date Of Birth          1995        Visit Information        Provider Department      11/9/2017 2:30 PM Sonja Abreu APRN [facility]        Today's Diagnoses     Mobile cecum           Follow-ups after your visit        Your next 10 appointments already scheduled     Nov 10, 2017 10:00 AM CST   New Visit with KOURTNEY Avalos   [facility] ([facility]  [address]  [facility] [id number] [phone]            Nov 16, 2017  2:30 PM CST   Return Visit with Kimo Hudson LP   [facility] ([facility])    [address]  [facility] [id number] [phone]            Nov 21, 2017 11:00 AM CST   Return Visit with Emily Rollins PT   [facility] ([facility])    [address]  [facility] [id number] [phone]            Dec 06, 2017  3:00 PM CST   (Arrive by 2:45 PM)   Return Botox with Frederick Bender MD   [facility] ([facility])    [address] [place] [address] [phone]            Dec 12, 2017  2:45 PM CST   Return Visit with Cata Hurst NP   [facility] ([facility])    [address]  [address]  [facility] [id number] [phone]            Jan 16, 2018 11:30 AM CST   Return Visit with Austen Marquez MD   [facility] ([facility])    [address] [id number] [phone]            Jan 17, 2018  2:20 PM CST   (Arrive by 2:05 PM)   New Patient Visit with Estela Cuenca MD   [facility] ([facility])    [facility]  [facility]  [address] [facility] [id number] [phone]            Jan 24, 2018  2:00 PM CST   (Arrive by 1:45 PM)   Return Visit with EVI Vizcaino CNP   [facility] ([facility])    [address] [facility]  [address] [facility] [id number] [phone]            Feb 13, 2018  2:30 PM CST   (Arrive by 2:15 PM)   Return Seizure with Sigifredo Flores MD   [facility] ([facility])    [address] [facility]  [address] [facility] [id number] [phone]              Who to contact     If you have questions or need follow up information about today's clinic visit or your schedule please contact [facility] directly at [phone].  Normal or non-critical lab and imaging results will be communicated to you by MyChart, letter or phone within 4 business days after the clinic has received the results. If you do not hear from us within 7 days, please contact the clinic through Correlixhart or phone. If you have a critical or abnormal lab result, we will notify you by phone as soon as possible.  Submit refill requests through JackBe or call your pharmacy and they will forward the refill request to us. Please allow 3 business days for your refill to be completed.          Additional Information About Your Visit        MyChart Information     [employer]Be gives you secure access to your electronic health record. If you see a primary care provider, you can also send messages to your care team and make appointments. If you have questions, please call your primary care clinic.  If you do not have a primary care provider, please call [phone] and they will assist you.        Care EveryWhere ID     This is your Care EveryWhere ID. This could be used by other organizations to access your [facility] medical records  [id number]        Your Vitals Were     Pulse Temperature Respirations Last Period Pulse  Oximetry BMI (Body Mass Index)    121 98  F (36.7  C) (Oral) 14 10/29/2017 98% 25.86 kg/m2       Blood Pressure from Last 3 Encounters:   11/09/17 114/82   11/03/17 121/89   10/24/17 152/90    Weight from Last 3 Encounters:   11/09/17 146 lb (66.2 kg)   11/03/17 147 lb (66.7 kg)   10/24/17 149 lb (67.6 kg)              Today, you had the following     No orders found for display       Primary Care Provider Office Phone # Fax #    Sonja Abreu APRSHANNON [facility] [phone]       [address] [facility] [address]  [facility] [address]        Equal Access to Services     NABIL GALEANO : Hadii aad ku hadasho Soteddyali, waaxda luqadaha, qaybta kaalmada adeegyada, waxay idiin hayarlet rodriguez . So [facility] [phone].    ATENCIÓN: Si habla español, tiene a livingston disposición servicios gratuitos de asistencia lingüística. Llame al [phone].    We comply with applicable federal civil rights laws and [place] laws. We do not discriminate on the basis of race, color, national origin, age, disability, sex, sexual orientation, or gender identity.            Thank you!     Thank you for choosing [facility]  for your care. Our goal is always to provide you with excellent care. Hearing back from our patients is one way we can continue to improve our services. Please take a few minutes to complete the written survey that you may receive in the mail after your visit with us. Thank you!             Your Updated Medication List - Protect others around you: Learn how to safely use, store and throw away your medicines at www.disposemymeds.org.          This list is accurate as of: 11/9/17  3:19 PM.  Always use your most recent med list.                   Brand Name Dispense Instructions for use Diagnosis    albuterol 108 (90 BASE) MCG/ACT Inhaler    PROAIR HFA/PROVENTIL HFA/VENTOLIN HFA    1 Inhaler    Inhale 2 puffs into the lungs every 6 hours as needed for shortness of breath / dyspnea or wheezing     Atypical chest pain       amitriptyline 50 MG tablet    ELAVIL    30 tablet    Take 1 tablet (50 mg) by mouth At Bedtime    Abdominal pain, generalized, Insomnia due to medical condition       apremilast 30 MG tablet    OTEZLA    60 tablet    20 mg in AM and 30mg in PM daily X 2 weeks and then 30mg twice daily.    Behcet's disease (H)       ASPIRIN CHILDRENS 81 MG chewable tablet   Generic drug:  aspirin      Take 81 mg by mouth as needed        betamethasone valerate 0.1 % cream    VALISONE     Apply topically 2 times daily        * botulinum toxin type A 100 UNITS injection    BOTOX    200 Units    Inject 200 Units into the muscle every 3 months    Cervical dystonia       * ONABOTULINUMTOXINA IJ      Inject 165 Units as directed once Lot# /[id number] Expiration: 04/2020        carbamide peroxide 6.5 % otic solution    DEBROX     5 drops 2 times daily        clobetasol 0.05 % cream    TEMOVATE    60 g    Apply topically 2 times daily    Folliculitis       Colchicine 0.6 MG Caps     90 capsule    Take 0.6 mg by mouth See Admin Instructions 2 capsules in AM and 1 capsule in PM    Behcet's syndrome (H)       COMPOUNDED NON-CONTROLLED SUBSTANCE - PHARMACY TO MIX COMPOUNDED MEDICATION    CMPD RX    30 capsule    Take one capsule in the morning    Fibromyalgia       diclofenac 1 % Gel topical gel    VOLTAREN    100 g    Apply 2-4 grams to affected area(s) up to 4 times per day as needed. This is an anti-inflammatory medication.    Polyarthralgia       dicyclomine 20 MG tablet    BENTYL    60 tablet    Take 1 tablet (20 mg) by mouth 4 times daily as needed    Abdominal cramping       diphenhydrAMINE 25 MG tablet    BENADRYL    30 tablet    Take 1 tablet (25 mg) by mouth every 8 hours as needed for allergies        EPINEPHrine 0.3 MG/0.3ML injection 2-pack    EPIPEN 2-EAMON    0.6 mL    Inject 0.3 mLs (0.3 mg) into the muscle as needed for anaphylaxis    Hx of bee sting allergy       EXCEDRIN MIGRAINE PO      Take by mouth as  needed        guanFACINE 1 MG tablet    TENEX    180 tablet    Take 3 tablets (3 mg) by mouth twice daily morning and evening.    Tic       hydrOXYzine 25 MG tablet    ATARAX    60 tablet    Take 1-2 tablets (25-50 mg) by mouth every 6 hours as needed for anxiety    TAHIR (generalized anxiety disorder)       hyoscyamine 0.125 MG tablet    ANASPAZ/LEVSIN    40 tablet    Take 1-2 tablets (125-250 mcg) by mouth every 4 hours as needed for cramping    Abdominal pain, generalized       hypromellose 0.3 % Soln ophthalmic solution    GENTEAL     1 drop every hour as needed        LACTAID PO      Take by mouth daily        lactulose 20 GM/30ML Soln     300 mL    Take 30 mLs by mouth 3 times daily as needed    Constipation, unspecified constipation type       lidocaine 2 % topical gel    XYLOCAINE     Apply 1 Tube topically daily Reported on 4/21/2017        lidocaine visc 2% 2.5mL/5mL & maalox/mylanta w/ simeth 2.5mL/5mL & diphenhydrAMINE 5mg/5mL Susp suspension    [facility] \[facility] [place] [facility]\""    1 Bottle    Swish and swallow 10 mLs in mouth every 6 hours as needed for mouth sores    Behcet's syndrome (H)       linaclotide 290 MCG capsule    LINZESS    90 capsule    Take 1 capsule (290 mcg) by mouth every morning (before breakfast)    Irritable bowel syndrome       LORazepam 1 MG tablet    ATIVAN    60 tablet    Take 0.5 tablets (0.5 mg) by mouth 2 times daily as needed for other (atypical chest pain) Do not operate a vehicle after taking this medication    Chronic abdominal pain       meclizine 25 MG tablet    ANTIVERT    30 tablet    Take 1 tablet (25 mg) by mouth every 6 hours as needed for dizziness    Nausea       norgestimate-ethinyl estradiol 0.25-35 MG-MCG per tablet    ORTHO-CYCLEN, SPRINTEC    84 tablet    Take 1 tablet by mouth daily    General counseling for prescription of oral contraceptives       omeprazole 40 MG capsule    priLOSEC    90 capsule    Take 1 capsule (40 mg) by mouth daily    Gastroesophageal reflux  disease, esophagitis presence not specified       ondansetron 8 MG ODT tab    ZOFRAN-ODT    60 tablet    Take 1 tablet (8 mg) by mouth every 8 hours as needed for nausea    Non-intractable vomiting with nausea, unspecified vomiting type, Hematemesis, presence of nausea not specified       pramoxine-zinc oxide in mineral oil 1-12.5 % Oint     1 Tube    Place 1 g rectally daily as needed for hemorrhoids or irritation        promethazine 25 MG tablet    PHENERGAN    20 tablet    Take 0.5-1 tablets (12.5-25 mg) by mouth every 6 hours as needed for nausea    Intractable chronic migraine without aura and without status migrainosus       sucralfate 1 GM/10ML suspension    CARAFATE    1200 mL    Take 10 mLs (1 g) by mouth 4 times daily    Bile reflux gastritis, Nausea       triamcinolone 0.1 % cream    KENALOG    15 g    Apply sparingly to oral ulcers three times daily for 14 days as needed.    Behcet's syndrome (H)       * Notice:  This list has 2 medication(s) that are the same as other medications prescribed for you. Read the directions carefully, and ask your doctor or other care provider to review them with you.

## 2017-11-09 NOTE — NURSING NOTE
"Chief Complaint   Patient presents with     Rectal Problem       Initial /82 (BP Location: Right arm)  Pulse 121  Temp 98  F (36.7  C) (Oral)  Resp 14  Wt 146 lb (66.2 kg)  LMP 10/29/2017  SpO2 98%  BMI 25.86 kg/m2 Estimated body mass index is 25.86 kg/(m^2) as calculated from the following:    Height as of 11/3/17: 5' 3\" (1.6 m).    Weight as of this encounter: 146 lb (66.2 kg).  Medication Reconciliation: complete     Mari Contreras CMA      "

## 2017-11-09 NOTE — PROGRESS NOTES
SUBJECTIVE:   Samara Oropeza is a 22 year old female who presents to clinic today for the following health issues:      Rectal Problem Follow-up      Duration: 1 1/2 weeks    Description:   Pain: YES- a little  Itching: YES a little    Accompanying signs and symptoms:   Blood in stool: no   Changes in stool pattern: no     History (similar episodes/previous evaluation): seen last Friday at [facility]    Precipitating or alleviating factors: None    Therapies tried and outcome: prescribed some cream but has not filled it yet  Radiologist noticed that her cecum changed position since the last CT scan, and would like her to follow up with gastroenterologist concerning mobile cecum.   Also, needs to have guanfacine switched from ER to IR, as IR worked better for controlling her tics.    -------------------------------------    Problem list and histories reviewed & adjusted, as indicated.  Additional history: as documented    Patient Active Problem List   Diagnosis     Tics - Tourette syndrome     IBS (irritable bowel syndrome)     Seasonal allergic rhinitis     TAHIR (generalized anxiety disorder)     Knee pain     Concussion     Milk protein intolerance     Headaches due to old head injury     Behcet's disease (H)     Migraine     Spell of shaking     On colchicine therapy     Palpitations     Fibromyalgia     Rheumatoid arthritis of multiple sites without rheumatoid factor (H)     Raynaud's disease without gangrene     Chronic abdominal pain     Left knee pain     Patellofemoral instability     PTSD (post-traumatic stress disorder)     Cervical dystonia     Acute left ankle pain     Cervical pain     Major depressive disorder, recurrent episode, moderate (H)     Chronic pain syndrome     Convulsions, unspecified convulsion type (H)     Transient alteration of awareness     Vitamin D deficiency     Mobile cecum     Past Surgical History:   Procedure Laterality Date     ARTHROSCOPY KNEE WITH PATELLAR REALIGNMENT   7/25/2013    Procedure: ARTHROSCOPY KNEE WITH PATELLAR REALIGNMENT;  Left Knee Arthroscopy, Medial Patellofemoral Ligament Reconstruction with Allograft  ;  Surgeon: Jennifer Acevedo MD;  Location: [facility]     COLONOSCOPY  2015     DENTAL SURGERY  1996    Teeth removal     ENDOSCOPY UPPER, COLONOSCOPY, COMBINED  2005     HC ESOPH/GAS REFLUX TEST W NASAL IMPED >1 HR N/A 2/15/2017    Procedure: ESOPHAGEAL IMPEDENCE FUNCTION TEST WITH 24 HOUR PH GREATER THAN 1 HOUR;  Surgeon: Timothy Matta MD;  Location:  GI       Social History   Substance Use Topics     Smoking status: Never Smoker     Smokeless tobacco: Never Used     Alcohol use 0.6 oz/week     1 Standard drinks or equivalent per week      Comment: MONTHLY     Family History   Problem Relation Age of Onset     Depression Mother      Neurologic Disorder Mother      Migraines, take imitrex injection.  Also in maternal grandmother.       Alcohol/Drug Father      Hypertension Father      Depression Father      Cardiovascular Maternal Grandmother      Depression Maternal Grandmother      Hypertension Maternal Grandmother      Alzheimer Disease Maternal Grandmother      Cardiovascular Maternal Grandfather      Hypertension Maternal Grandfather      Depression Maternal Grandfather      Alcohol/Drug Maternal Grandfather      Cardiovascular Paternal Grandmother      Hypertension Paternal Grandmother      Cardiovascular Paternal Grandfather      Hypertension Paternal Grandfather      Glaucoma No family hx of      Macular Degeneration No family hx of              Reviewed and updated as needed this visit by clinical staff       Reviewed and updated as needed this visit by Provider         ROS:  Constitutional, HEENT, cardiovascular, pulmonary, gi and gu systems are negative, except as otherwise noted.      OBJECTIVE:   /82 (BP Location: Right arm)  Pulse 121  Temp 98  F (36.7  C) (Oral)  Resp 14  Wt 146 lb (66.2 kg)  LMP 10/29/2017  SpO2 98%  BMI  25.86 kg/m2  Body mass index is 25.86 kg/(m^2).   GENERAL: healthy, alert and no distress  NECK: no adenopathy, no asymmetry, masses, or scars and thyroid normal to palpation  RESP: lungs clear to auscultation - no rales, rhonchi or wheezes  CV: regular rate and rhythm, normal S1 S2, no S3 or S4, no murmur, click or rub, no peripheral edema and peripheral pulses strong  ABDOMEN: tenderness mild, generalized and bowel sounds normal  MS: no gross musculoskeletal defects noted, no edema    Diagnostic Test Results:  No results found for this or any previous visit (from the past 24 hour(s)).    ASSESSMENT/PLAN:     Problem List Items Addressed This Visit     Tics - Tourette syndrome    Relevant Medications    guanFACINE (TENEX) 1 MG tablet    Mobile cecum       Wrote note to GI, alerting them to issue with mobile cecum, and they will call patient to follow up.   EVI Cardona [facility]3]

## 2017-11-13 DIAGNOSIS — K60.2 ANAL FISSURE: Primary | ICD-10-CM

## 2017-11-15 ENCOUNTER — TELEPHONE (OUTPATIENT)
Dept: FAMILY MEDICINE | Facility: CLINIC | Age: 22
End: 2017-11-15

## 2017-11-15 ENCOUNTER — VIRTUAL VISIT (OUTPATIENT)
Dept: NEUROLOGY | Facility: CLINIC | Age: 22
End: 2017-11-15
Payer: COMMERCIAL

## 2017-11-15 DIAGNOSIS — F95.9 TIC: ICD-10-CM

## 2017-11-15 DIAGNOSIS — R40.4 TRANSIENT ALTERATION OF AWARENESS: ICD-10-CM

## 2017-11-15 DIAGNOSIS — R56.9 CONVULSIONS, UNSPECIFIED CONVULSION TYPE (H): Primary | ICD-10-CM

## 2017-11-15 RX ORDER — GUANFACINE 1 MG/1
3 TABLET ORAL 2 TIMES DAILY
Qty: 270 TABLET | Refills: 3 | Status: SHIPPED | OUTPATIENT
Start: 2017-11-15 | End: 2018-07-17

## 2017-11-15 NOTE — TELEPHONE ENCOUNTER
Yes, okay with prescribing the higher dose as her tics have been controlled at that dose for some time.

## 2017-11-15 NOTE — TELEPHONE ENCOUNTER
Routing to provider - Brady - please review and advise as appropriate  1. Medication clarification: guanFACINE (TENEX) 1 MG tablet  2. Per [facility] pharmacist - 6 mg daily -is higher than adult daily recommended dose - are you OK with prescribing at this time?  3. Looks like patient has been taking this medication at this dose for 6 months - previously was taking 9 mg on 2/1/2017  4. Please note rx is for 2 month supply - writer pended one year supply and clarification    Thank you,  Wes Locke RN

## 2017-11-15 NOTE — TELEPHONE ENCOUNTER
Routing to provider - Brady - please review and advise as appropriate  1. Duplicate message - see TE 11/9/2017  2. Patient has sent multiple messages to neurology as well  3. Do you have any further recommendations at this time or should patient continues to wait for response?    Thank you,  Wes Locke RN

## 2017-11-15 NOTE — PROGRESS NOTES
Report of Telephone Call  I called the patient for a pre-arranged review of her brain MRI and out-patient EEG results.  I told her that no tumors or other major lesions were found, but that the MRI showed white matter signal changes that may have been caused by a head injury.  I told her that the EEG did not record any epilepsy-associated abnormalities.    She noted that she has had ongoing convulsions, drop attacks and staring spells following the most recent clinic visit.  She confirmed that she would like to be admitted to [facility] for diagnostic VEEG, as we previously discussed.  I entered the order for this procedure.    Sigifredo Flores M.D.

## 2017-11-15 NOTE — TELEPHONE ENCOUNTER
guanFACINE (TENEX) 1 MG tablet-Pharmacy called and said that they had sent a message over the other day in regard to this medication as they had a question and had not gotten a response. Please call [phone] and ask for Su

## 2017-11-15 NOTE — TELEPHONE ENCOUNTER
Can you please call the pharmacy and see what they needed? Do they not have the 1mg in stock? Thank you  I do think that likely [place] will need to keep waiting on the response for neurology, or call and ask for an earlier follow up appointment

## 2017-11-15 NOTE — MR AVS SNAPSHOT
After Visit Summary   11/15/2017    Samara Oropeza    MRN: [id number]           Patient Information     Date Of Birth          1995        Visit Information        Provider Department      11/15/2017 4:45 PM Sigifredo Flores MD [facility]        Today's Diagnoses     Convulsions, unspecified convulsion type (H)    -  1    Transient alteration of awareness           Follow-ups after your visit        Follow-up notes from your care team     Return if symptoms worsen or fail to improve.      Your next 10 appointments already scheduled     Nov 16, 2017  2:30 PM CST   Return Visit with Kimo Hudson LP   [facility] ([facility])    [address]  [address]  [facility] [address] [phone]            Nov 21, 2017 11:00 AM CST   Return Visit with Emily Rollins PT   [facility] ([facility])    [address]  [facility] [id number] [phone]            Dec 06, 2017  3:00 PM CST   (Arrive by 2:45 PM)   Return Botox with Frederick Bender MD   [facility] ([facility])    [address] [place] [address]  [address]rd [facility] [address] [phone]            Dec 12, 2017  2:45 PM CST   [facility] Visit with Cata Hurst NP   [facility]    [address]  [address]  [facility] [address] [phone]            Jan 16, 2018 11:30 AM CST   Return Visit with Austen Marquez MD   [facility] ([facility])    [address] [id number] [phone]            Jan 17, 2018  2:20 PM CST   (Arrive by 2:05 PM)   New Patient Visit with Estela Cuenca MD   [facility] ([facility])    [address] [place] [address]  [address]th [facility] [address]    [phone]            Jan 24, 2018  2:00 PM CST   (Arrive by 1:45 PM)   Return Visit with EVI Vizcaino CNP   [facility] ([facility])    [address]  4th [address]  [facility] [address] [phone]            Feb 13, 2018  2:30 PM CST   (Arrive by 2:15 PM)   Return Seizure with Sigifredo Flores MD   [facility] Neurology ([facility])    [address]  3rd Floor  [facility] [address] [phone]              Future tests that were ordered for you today     Open Future Orders        Priority Expected Expires Ordered    EEG video monitoring Routine 11/16/2017 3/15/2018 11/15/2017            Who to contact     Please call your clinic at [phone] to:    Ask questions about your health    Make or cancel appointments    Discuss your medicines    Learn about your test results    Speak to your doctor   If you have compliments or concerns about an experience at your clinic, or if you wish to file a complaint, please contact [facility] Patient Relations at [phone] or email us at [email or web address]         Additional Information About Your Visit        [email or web address] Information     [employer] gives you secure access to your electronic health record. If you see a primary care provider, you can also send messages to your care team and make appointments. If you have questions, please call your primary care clinic.  If you do not have a primary care provider, please call [phone] and they will assist you.      [employer] is an electronic gateway that provides easy, online access to your medical records. With [employer], you can request a clinic appointment, read your test results, renew a prescription or communicate with your care team.     To access your existing account, please contact your [facility] or call [phone] for assistance.         Care EveryWhere ID     This is your Care EveryWhere ID. This could be used by other organizations to access your [place] medical records  [id number]        Your Vitals Were     Last Period                   10/29/2017            Blood Pressure from Last 3 Encounters:   11/09/17 114/82   11/03/17 121/89   10/24/17 152/90    Weight from Last 3 Encounters:   11/09/17 146 lb (66.2 kg)   11/03/17 147 lb (66.7 kg)   10/24/17 149 lb (67.6 kg)                 Where to get your medicines      These medications were sent to [place] [address] [facility] - [address] [address]     Phone:  [phone]     guanFACINE 1 MG tablet          Primary Care Provider Office Phone # Fax #    Sonja Abreu, APRN [facility] [phone]       [address]  [facility] [address]        Equal Access to Services     FRANK GALEANO : Hadii aad ku hadasho Soomaali, waaxda luqadaha, qaybta kaalmada adeegyada, waxay idiin hayaan maddie rodriguezarachante rodriguez . So [facility] [phone].    ATENCIÓN: Si habla español, tiene a livingston disposición servicios gratuitos de asistencia lingüística. Llame al [phone].    We comply with applicable federal civil rights laws and [place] laws. We do not discriminate on the basis of race, color, national origin, age, disability, sex, sexual orientation, or gender identity.            Thank you!     Thank you for choosing [facility]  for your care. Our goal is always to provide you with excellent care. Hearing back from our patients is one way we can continue to improve our services. Please take a few minutes to complete the written survey that you may receive in the mail after your visit with us. Thank you!             Your Updated Medication List - Protect others around you: Learn how to safely use, store and throw away your medicines at www.disposemymeds.org.          This list is accurate as of: 11/15/17  5:10 PM.  Always use your most  recent med list.                   Brand Name Dispense Instructions for use Diagnosis    albuterol 108 (90 BASE) MCG/ACT Inhaler    PROAIR HFA/PROVENTIL HFA/VENTOLIN HFA    1 Inhaler    Inhale 2 puffs into the lungs every 6 hours as needed for shortness of breath / dyspnea or wheezing    Atypical chest pain       amitriptyline 50 MG tablet    ELAVIL    30 tablet    Take 1 tablet (50 mg) by mouth At Bedtime    Abdominal pain, generalized, Insomnia due to medical condition       apremilast 30 MG tablet    OTEZLA    60 tablet    20 mg in AM and 30mg in PM daily X 2 weeks and then 30mg twice daily.    Behcet's disease (H)       ASPIRIN CHILDRENS 81 MG chewable tablet   Generic drug:  aspirin      Take 81 mg by mouth as needed        betamethasone valerate 0.1 % cream    VALISONE     Apply topically 2 times daily        * botulinum toxin type A 100 UNITS injection    BOTOX    200 Units    Inject 200 Units into the muscle every 3 months    Cervical dystonia       * ONABOTULINUMTOXINA IJ      Inject 165 Units as directed once Lot# /[id number] Expiration: 04/2020        carbamide peroxide 6.5 % otic solution    DEBROX     5 drops 2 times daily        clobetasol 0.05 % cream    TEMOVATE    60 g    Apply topically 2 times daily    Folliculitis       Colchicine 0.6 MG Caps     90 capsule    Take 0.6 mg by mouth See Admin Instructions 2 capsules in AM and 1 capsule in PM    Behcet's syndrome (H)       COMPOUNDED NON-CONTROLLED SUBSTANCE - PHARMACY TO MIX COMPOUNDED MEDICATION    CMPD RX    30 capsule    Take one capsule in the morning    Fibromyalgia       diclofenac 1 % Gel topical gel    VOLTAREN    100 g    Apply 2-4 grams to affected area(s) up to 4 times per day as needed. This is an anti-inflammatory medication.    Polyarthralgia       dicyclomine 20 MG tablet    BENTYL    60 tablet    Take 1 tablet (20 mg) by mouth 4 times daily as needed    Abdominal cramping       diltiazem 2% in PLO cream (FV COMPOUNDED) 2% Gel     30 g     Apply small pea size amount three times daily to anus until pain is gone.    Anal fissure       diphenhydrAMINE 25 MG tablet    BENADRYL    30 tablet    Take 1 tablet (25 mg) by mouth every 8 hours as needed for allergies        EPINEPHrine 0.3 MG/0.3ML injection 2-pack    EPIPEN 2-EAMON    0.6 mL    Inject 0.3 mLs (0.3 mg) into the muscle as needed for anaphylaxis    Hx of bee sting allergy       EXCEDRIN MIGRAINE PO      Take by mouth as needed        guanFACINE 1 MG tablet    TENEX    270 tablet    Take 3 tablets (3 mg) by mouth 2 times daily    Tic       hydrOXYzine 25 MG tablet    ATARAX    60 tablet    Take 1-2 tablets (25-50 mg) by mouth every 6 hours as needed for anxiety    TAHIR (generalized anxiety disorder)       hyoscyamine 0.125 MG tablet    ANASPAZ/LEVSIN    40 tablet    Take 1-2 tablets (125-250 mcg) by mouth every 4 hours as needed for cramping    Abdominal pain, generalized       hypromellose 0.3 % Soln ophthalmic solution    GENTEAL     1 drop every hour as needed        LACTAID PO      Take by mouth daily        lactulose 20 GM/30ML Soln     300 mL    Take 30 mLs by mouth 3 times daily as needed    Constipation, unspecified constipation type       lidocaine 2 % topical gel    XYLOCAINE     Apply 1 Tube topically daily Reported on 4/21/2017        lidocaine visc 2% 2.5mL/5mL & maalox/mylanta w/ simeth 2.5mL/5mL & diphenhydrAMINE 5mg/5mL Susp suspension    [facility] Mouthwash [facility]    1 Bottle    Swish and swallow 10 mLs in mouth every 6 hours as needed for mouth sores    Behcet's syndrome (H)       linaclotide 290 MCG capsule    LINZESS    90 capsule    Take 1 capsule (290 mcg) by mouth every morning (before breakfast)    Irritable bowel syndrome       LORazepam 1 MG tablet    ATIVAN    60 tablet    Take 0.5 tablets (0.5 mg) by mouth 2 times daily as needed for other (atypical chest pain) Do not operate a vehicle after taking this medication    Chronic abdominal pain       meclizine 25 MG tablet     ANTIVERT    30 tablet    Take 1 tablet (25 mg) by mouth every 6 hours as needed for dizziness    Nausea       norgestimate-ethinyl estradiol 0.25-35 MG-MCG per tablet    ORTHO-CYCLEN, SPRINTEC    84 tablet    Take 1 tablet by mouth daily    General counseling for prescription of oral contraceptives       omeprazole 40 MG capsule    priLOSEC    90 capsule    Take 1 capsule (40 mg) by mouth daily    Gastroesophageal reflux disease, esophagitis presence not specified       ondansetron 8 MG ODT tab    ZOFRAN-ODT    60 tablet    Take 1 tablet (8 mg) by mouth every 8 hours as needed for nausea    Non-intractable vomiting with nausea, unspecified vomiting type, Hematemesis, presence of nausea not specified       pramoxine-zinc oxide in mineral oil 1-12.5 % Oint     1 Tube    Place 1 g rectally daily as needed for hemorrhoids or irritation        promethazine 25 MG tablet    PHENERGAN    20 tablet    Take 0.5-1 tablets (12.5-25 mg) by mouth every 6 hours as needed for nausea    Intractable chronic migraine without aura and without status migrainosus       sucralfate 1 GM/10ML suspension    CARAFATE    1200 mL    Take 10 mLs (1 g) by mouth 4 times daily    Bile reflux gastritis, Nausea       triamcinolone 0.1 % cream    KENALOG    15 g    Apply sparingly to oral ulcers three times daily for 14 days as needed.    Behcet's syndrome (H)       * Notice:  This list has 2 medication(s) that are the same as other medications prescribed for you. Read the directions carefully, and ask your doctor or other care provider to review them with you.

## 2017-11-16 ENCOUNTER — OFFICE VISIT (OUTPATIENT)
Dept: PALLIATIVE MEDICINE | Facility: CLINIC | Age: 22
End: 2017-11-16
Payer: COMMERCIAL

## 2017-11-16 DIAGNOSIS — M54.9 CHRONIC NECK AND BACK PAIN: ICD-10-CM

## 2017-11-16 DIAGNOSIS — G89.4 CHRONIC PAIN SYNDROME: Primary | ICD-10-CM

## 2017-11-16 DIAGNOSIS — M54.2 CHRONIC NECK AND BACK PAIN: ICD-10-CM

## 2017-11-16 DIAGNOSIS — M79.7 FIBROMYALGIA: ICD-10-CM

## 2017-11-16 DIAGNOSIS — G89.29 CHRONIC NECK AND BACK PAIN: ICD-10-CM

## 2017-11-16 PROCEDURE — 96152 ZZHC HEALTH AND BEHAVIOR INTERVENTION, INDIVIDUAL, EACH 15 MINUTES: CPT | Performed by: PSYCHOLOGIST

## 2017-11-16 NOTE — PROGRESS NOTES
"                                  [facility]   [facility] [place]  Behavioral Medicine Visit    Patient Name: Samara Oropeza    YOB: 1995   Medical Record Number: [id number]  Date: 11/16/2017                SUBJECTIVE:  Today the patient reports the following: Her pain is moderately worse, her mood is mildly worse, her activity level is mildly increased, her stress level is greatly worse, her sleep has been the same. Patient reports I feel like I'm digressing because of the current stressors in my life at this time\"    The patient identifies high conflict with her mother and stepfather regarding her recent move. She shared text messages sent by her mother which were in my estimation very caustic. The patient also reports that she was given information about her \"brain test results\" and they essentially confirmed there is some kind of neurological question related to her seizures.    The patient's presentation is complicated by intense negative interpersonal relationships with extended family that has much enmeshment and very poor interpersonal boundaries. The patient routinely does not set limits and how she communicates with her family or else she sets a overly rigid limits that create other types of problems. This patient may benefit from a long-term therapy relationship with a provider who is female and works with young adults. It seems when she is in a brief model environment she does not do well.          OBJECTIVE: Met with the patient on this date for an elective return appointment. This is our eighth visit post assessment.   Length of Visit: 60 minutes      Assessment: Current Emotional / Mental Status    Appearance:   Appropriate   Eye Contact:   Fair   Psychomotor Behavior: Retarded (Slowed)   Attitude:   Cooperative   Orientation:   All  Speech  Rate / Production:             Slow   Volume:              Soft   Mood:    Angry  Anxious  " Depressed  Anger repressed   Affect:    Blunted  Flat  Subdued  Worrisome   Thought Content:  Clear   Thought Form:  Coherent  Logical   Insight:    Fair     ASSESSMENT:   Axis I: Chronic pain syndrome, fibromyalgia, chronic neck and back pain    Progress toward goals: Limited.    Pain Status: worsened    Emotional Status: worsened              Medication / chemical use concerns:  None    PLAN:   Next Appointment: Samara Oropeza will schedule a follow-up appointment in 4 weeks.  Assignment: Work on limit setting with mother and stepfather, work on self-care for pain  Objectives / interventions for next session: Follow-up    Kimo Hudson MA LP, [facility]  Behavioral Pain Specialist  [facility]

## 2017-11-16 NOTE — MR AVS SNAPSHOT
After Visit Summary   11/16/2017    Samara Oropeza    MRN: [id number]           Patient Information     Date Of Birth          1995        Visit Information        Provider Department      11/16/2017 2:30 PM Kimo [facility] LP [facility]        Today's Diagnoses     Chronic pain syndrome    -  1    Fibromyalgia        Chronic neck and back pain           Follow-ups after your visit        Your next 10 appointments already scheduled     Nov 21, 2017 11:00 AM CST   Return Visit with Emily Rollins PT   [facility] ([facility])    [address] [place] [id number] [phone]            Dec 06, 2017  3:00 PM CST   (Arrive by 2:45 PM)   Return Botox with Frederick Bender MD   [facility] ([facility])    [address] [place] [id number] [phone]            Dec 12, 2017  2:45 PM CST   [facility] Visit with Cata Hurst NP   [facility]    [address]  [address]  [facility] [id number] [phone]            Jan 16, 2018 11:30 AM CST   [facility] Visit with Austen [facility] MD   [facility]    [address] [id number] [phone]            Jan 17, 2018  2:20 PM CST   (Arrive by 2:05 PM)   New Patient Visit with Estela Cuenca MD   [facility] ([facility])    [address] [place] [address]  4th [facility] [address] [phone]            Jan 24, 2018  2:00 PM CST   (Arrive by 1:45 PM)   Return Visit with EVI Vizcaino CNP   [facility] ([facility])    [address] [place] [id number] [phone]            Feb 13, 2018   2:30 PM CST   (Arrive by 2:15 PM)   Return Seizure with Sigifredo Flores MD   [facility] ([facility])    [address] [facility]  3rd [facility] [address] [phone]              Who to contact     If you have questions or need follow up information about today's clinic visit or your schedule please contact [facility] directly at [phone].  Normal or non-critical lab and imaging results will be communicated to you by [employer], letter or phone within 4 business days after the clinic has received the results. If you do not hear from us within 7 days, please contact the clinic through [employer] or phone. If you have a critical or abnormal lab result, we will notify you by phone as soon as possible.  Submit refill requests through [employer] or call your pharmacy and they will forward the refill request to us. Please allow 3 business days for your refill to be completed.          Additional Information About Your Visit        [employer] Information     [employer] gives you secure access to your electronic health record. If you see a primary care provider, you can also send messages to your care team and make appointments. If you have questions, please call your primary care clinic.  If you do not have a primary care provider, please call [phone] and they will assist you.        Care EveryWhere ID     This is your Care EveryWhere ID. This could be used by other organizations to access your [place] medical records  [id number]        Your Vitals Were     Last Period                   10/29/2017            Blood Pressure from Last 3 Encounters:   11/09/17 114/82   11/03/17 121/89   10/24/17 152/90    Weight from Last 3 Encounters:   11/09/17 66.2 kg (146 lb)   11/03/17 66.7 kg (147 lb)   10/24/17 67.6 kg (149 lb)              Today, you had the following     No orders found for display       Primary Care Provider Office Phone # Fax #    Sonja Abreu,  APRN [phone]       [address]  [facility] [address]        Equal Access to Services     NABIL GALEANO : Hadcarlin malcolm brady brandyn Santiago, waluigida luqpayton, qakarta karaffyda yessica, mike crane laRobertarlet parsons. So [facility] [phone].    ATENCIÓN: Si habla español, tiene a livingston disposición servicios gratuitos de asistencia lingüística. Llame al [phone].    We comply with applicable federal civil rights laws and [place] laws. We do not discriminate on the basis of race, color, national origin, age, disability, sex, sexual orientation, or gender identity.            Thank you!     Thank you for choosing [facility]  for your care. Our goal is always to provide you with excellent care. Hearing back from our patients is one way we can continue to improve our services. Please take a few minutes to complete the written survey that you may receive in the mail after your visit with us. Thank you!             Your Updated Medication List - Protect others around you: Learn how to safely use, store and throw away your medicines at www.disposemymeds.org.          This list is accurate as of: 11/16/17 11:59 PM.  Always use your most recent med list.                   Brand Name Dispense Instructions for use Diagnosis    albuterol 108 (90 BASE) MCG/ACT Inhaler    PROAIR HFA/PROVENTIL HFA/VENTOLIN HFA    1 Inhaler    Inhale 2 puffs into the lungs every 6 hours as needed for shortness of breath / dyspnea or wheezing    Atypical chest pain       amitriptyline 50 MG tablet    ELAVIL    30 tablet    Take 1 tablet (50 mg) by mouth At Bedtime    Abdominal pain, generalized, Insomnia due to medical condition       apremilast 30 MG tablet    OTEZLA    60 tablet    20 mg in AM and 30mg in PM daily X 2 weeks and then 30mg twice daily.    Behcet's disease (H)       ASPIRIN CHILDRENS 81 MG chewable tablet   Generic drug:  aspirin      Take 81 mg by mouth as needed         betamethasone valerate 0.1 % cream    VALISONE     Apply topically 2 times daily        * botulinum toxin type A 100 UNITS injection    BOTOX    200 Units    Inject 200 Units into the muscle every 3 months    Cervical dystonia       * ONABOTULINUMTOXINA IJ      Inject 165 Units as directed once Lot# /[id number] Expiration: 04/2020        carbamide peroxide 6.5 % otic solution    DEBROX     5 drops 2 times daily        clobetasol 0.05 % cream    TEMOVATE    60 g    Apply topically 2 times daily    Folliculitis       Colchicine 0.6 MG Caps     90 capsule    Take 0.6 mg by mouth See Admin Instructions 2 capsules in AM and 1 capsule in PM    Behcet's syndrome (H)       COMPOUNDED NON-CONTROLLED SUBSTANCE - PHARMACY TO MIX COMPOUNDED MEDICATION    CMPD RX    30 capsule    Take one capsule in the morning    Fibromyalgia       diclofenac 1 % Gel topical gel    VOLTAREN    100 g    Apply 2-4 grams to affected area(s) up to 4 times per day as needed. This is an anti-inflammatory medication.    Polyarthralgia       dicyclomine 20 MG tablet    BENTYL    60 tablet    Take 1 tablet (20 mg) by mouth 4 times daily as needed    Abdominal cramping       diltiazem 2% in PLO cream (FV COMPOUNDED) 2% Gel     30 g    Apply small pea size amount three times daily to anus until pain is gone.    Anal fissure       diphenhydrAMINE 25 MG tablet    BENADRYL    30 tablet    Take 1 tablet (25 mg) by mouth every 8 hours as needed for allergies        EPINEPHrine 0.3 MG/0.3ML injection 2-pack    EPIPEN 2-EAMON    0.6 mL    Inject 0.3 mLs (0.3 mg) into the muscle as needed for anaphylaxis    Hx of bee sting allergy       EXCEDRIN MIGRAINE PO      Take by mouth as needed        guanFACINE 1 MG tablet    TENEX    270 tablet    Take 3 tablets (3 mg) by mouth 2 times daily    Tic       hydrOXYzine 25 MG tablet    ATARAX    60 tablet    Take 1-2 tablets (25-50 mg) by mouth every 6 hours as needed for anxiety    TAHIR (generalized anxiety disorder)        hyoscyamine 0.125 MG tablet    ANASPAZ/LEVSIN    40 tablet    Take 1-2 tablets (125-250 mcg) by mouth every 4 hours as needed for cramping    Abdominal pain, generalized       hypromellose 0.3 % Soln ophthalmic solution    GENTEAL     1 drop every hour as needed        LACTAID PO      Take by mouth daily        lactulose 20 GM/30ML Soln     300 mL    Take 30 mLs by mouth 3 times daily as needed    Constipation, unspecified constipation type       lidocaine 2 % topical gel    XYLOCAINE     Apply 1 Tube topically daily Reported on 4/21/2017        lidocaine visc 2% 2.5mL/5mL & maalox/mylanta w/ simeth 2.5mL/5mL & diphenhydrAMINE 5mg/5mL Susp suspension    [facility]    1 Bottle    Swish and swallow 10 mLs in mouth every 6 hours as needed for mouth sores    Behcet's syndrome (H)       linaclotide 290 MCG capsule    LINZESS    90 capsule    Take 1 capsule (290 mcg) by mouth every morning (before breakfast)    Irritable bowel syndrome       LORazepam 1 MG tablet    ATIVAN    60 tablet    Take 0.5 tablets (0.5 mg) by mouth 2 times daily as needed for other (atypical chest pain) Do not operate a vehicle after taking this medication    Chronic abdominal pain       meclizine 25 MG tablet    ANTIVERT    30 tablet    Take 1 tablet (25 mg) by mouth every 6 hours as needed for dizziness    Nausea       norgestimate-ethinyl estradiol 0.25-35 MG-MCG per tablet    ORTHO-CYCLEN, SPRINTEC    84 tablet    Take 1 tablet by mouth daily    General counseling for prescription of oral contraceptives       omeprazole 40 MG capsule    priLOSEC    90 capsule    Take 1 capsule (40 mg) by mouth daily    Gastroesophageal reflux disease, esophagitis presence not specified       ondansetron 8 MG ODT tab    ZOFRAN-ODT    60 tablet    Take 1 tablet (8 mg) by mouth every 8 hours as needed for nausea    Non-intractable vomiting with nausea, unspecified vomiting type, Hematemesis, presence of nausea not specified       pramoxine-zinc  oxide in mineral oil 1-12.5 % Oint     1 Tube    Place 1 g rectally daily as needed for hemorrhoids or irritation        promethazine 25 MG tablet    PHENERGAN    20 tablet    Take 0.5-1 tablets (12.5-25 mg) by mouth every 6 hours as needed for nausea    Intractable chronic migraine without aura and without status migrainosus       sucralfate 1 GM/10ML suspension    CARAFATE    1200 mL    Take 10 mLs (1 g) by mouth 4 times daily    Bile reflux gastritis, Nausea       triamcinolone 0.1 % cream    KENALOG    15 g    Apply sparingly to oral ulcers three times daily for 14 days as needed.    Behcet's syndrome (H)       * Notice:  This list has 2 medication(s) that are the same as other medications prescribed for you. Read the directions carefully, and ask your doctor or other care provider to review them with you.

## 2017-11-21 DIAGNOSIS — M35.2 BEHCET'S DISEASE (H): ICD-10-CM

## 2017-11-21 NOTE — TELEPHONE ENCOUNTER
Leydi,   Please call pharmacy.   Thanks   Valdez            Previous Messages       ----- Message -----      From: Mika Redman RN      Sent: 11/21/2017  10:51 AM        To: Austen Marquez MD         ----- Message -----      From: Cyndie Dean LPN      Sent: 11/20/2017  11:27 AM        To: Adult Rheum Triage-[facility].  Pharmacy called [phone] regarding Jeni?? A patient of Dr. Marion Panda, not sure how you guys handle his patients.   Thanks              Refill request for otezla    Medication last filled 4/18/17 Quantity 60 Refills 3    Last rheumatology office visit 10/18/17  Future rheumatology office visit 01/16/18    Lab Results   Component Value Date    WBC 5.4 11/03/2017    HGB 13.4 11/03/2017    MCV 89 11/03/2017    MCH 29.2 11/03/2017    MCHC 32.9 11/03/2017    RDW 13.1 11/03/2017     11/03/2017     Lab Results   Component Value Date    ALT 40 11/03/2017     Lab Results   Component Value Date    AST 34 11/03/2017     Lab Results   Component Value Date    CR 0.78 11/03/2017     No results found for: URIC

## 2017-11-30 ENCOUNTER — TELEPHONE (OUTPATIENT)
Dept: SURGERY | Facility: CLINIC | Age: 22
End: 2017-11-30

## 2017-11-30 ENCOUNTER — TELEPHONE (OUTPATIENT)
Dept: GASTROENTEROLOGY | Facility: CLINIC | Age: 22
End: 2017-11-30

## 2017-11-30 NOTE — TELEPHONE ENCOUNTER
Per task, I called the patient to discuss scheduling a consult for her to see Dr. Mojica. I confirmed 12/18/2017 at 10:00 am with her.

## 2017-11-30 NOTE — TELEPHONE ENCOUNTER
"Discussed with Dr. Mojica who has treated a number of mobile ceca.  Spoke with Samara and described meeting him, possible additional study.    She reports increase in abdomen pain with vomiting. Diarrhea is less often present with the pain.    Note ct 11/3/17 with cecum in LUQ.  Ct April 2017 with 'mural stratification in ascending colon\" when cecum was in RLQ soon after a pain episode  "

## 2017-11-30 NOTE — TELEPHONE ENCOUNTER
"----- Message from Dione Ray RN sent at 11/30/2017  3:12 PM CST -----  Regarding: RE: floppy cecum pt. to schedule with BEBETO Mojica in clinic. also  A CT curiosity   Yes, that is really far out.  Schedule 12/18 at 10 AM- 60 minutes.  ----- Message -----     From: Sophia Kelsy     Sent: 11/30/2017   2:58 PM       To: Dione Ray RN  Subject: RE: floppy cecum pt. to schedule with BEBETO Parham,    His next available is 02/01/2018 when he has a scheduled clinic. Is that too far out?    ThanksKelsy  ----- Message -----     From: Dione Ray RN     Sent: 11/30/2017   2:32 PM       To: Kelsy Cortes  Subject: FW: floppy cecum pt. to schedule with BEBETO Tierney,  Can you please schedule this patient with Dr. Mojica in the clinic?  Dione Shirley  ----- Message -----     From: Kacie Almeida, APRN CNP     Sent: 11/30/2017   1:59 PM       To: Tj Mojica MD, Dione Ray RN  Subject: floppy cecum pt. to schedule with BEBETO peña    Hi Dione,  Dr. Mojica aware.   Note CT 11/3/2017.  And curiously, CT April with \"Questionable mural stratification of the  ascending colon.\"    On phone today, she feels symptoms are getting more frequent and worse, with vomiting \"due to pain\", diarrhea less regular vis pain.    Will be admitted for five to ten days for seizure evaluation December 15. Could be rescheduled.   [I was thinking, frequent vomiting with pain is not the ideal condition for seizure evaluation.]    Thank you  Kacie        "

## 2017-12-01 ENCOUNTER — CARE COORDINATION (OUTPATIENT)
Dept: SURGERY | Facility: CLINIC | Age: 22
End: 2017-12-01

## 2017-12-01 NOTE — PROGRESS NOTES
Called and spoke with patient regarding her appointment with Dr. Mojica 12/18. Discussed that Dr. Mojica reviewed her chart and that the appointment 12/18 is appropriate. He is happy to see her for a consult. Patient states she will change the dates of her hospital stay for her seizure activity because her abdominal issues are a priority. Informed patient that she would be notified if an appointment opened up earlier than 12/18. She was in agreement of the plan.      Patient mother called regarding Dr. Mojica appt 12/18/17. States she thinks her daughter should be seen sooner. She would like a call back to discuss her daughters symptoms to see if Dr. Mojica would see her sooner.     Work  or cell [phone]

## 2017-12-06 ENCOUNTER — OFFICE VISIT (OUTPATIENT)
Dept: PHYSICAL MEDICINE AND REHAB | Facility: CLINIC | Age: 22
End: 2017-12-06

## 2017-12-06 VITALS
SYSTOLIC BLOOD PRESSURE: 109 MMHG | OXYGEN SATURATION: 99 % | TEMPERATURE: 98 F | WEIGHT: 146 LBS | BODY MASS INDEX: 25.87 KG/M2 | DIASTOLIC BLOOD PRESSURE: 63 MMHG | HEIGHT: 63 IN | RESPIRATION RATE: 20 BRPM | HEART RATE: 94 BPM

## 2017-12-06 DIAGNOSIS — G43.719 INTRACTABLE CHRONIC MIGRAINE WITHOUT AURA AND WITHOUT STATUS MIGRAINOSUS: Primary | ICD-10-CM

## 2017-12-06 ASSESSMENT — PAIN SCALES - GENERAL: PAINLEVEL: SEVERE PAIN (7)

## 2017-12-06 NOTE — LETTER
"12/6/2017       RE: Samara Oropeza  [address]  [facility] [address]     Dear Colleague,    Thank you for referring your patient, Samara Oropeza, to the [facility] PHYSICAL MEDICINE AND REHABILITATION at [facility]. Please see a copy of my visit note below.    BOTULINUM TOXIN PROCEDURE NOTE    Chief Complaint   Patient presents with     RECHECK     UMP- BOTOX- CERVICAL DYSTONIA     /63  Pulse 94  Temp 98  F (36.7  C) (Oral)  Resp 20  Ht 1.6 m (5' 3\")  Wt 66.2 kg (146 lb)  SpO2 99%  Breastfeeding? No  BMI 25.86 kg/m2    Current Outpatient Prescriptions:      apremilast (OTEZLA) 30 MG tablet, 30mg twice daily., Disp: 180 tablet, Rfl: 0     guanFACINE (TENEX) 1 MG tablet, Take 3 tablets (3 mg) by mouth 2 times daily, Disp: 270 tablet, Rfl: 3     diltiazem 2% in PLO cream, FV COMPOUNDED, 2% GEL, Apply small pea size amount three times daily to anus until pain is gone., Disp: 30 g, Rfl: 1     pramoxine-zinc oxide in mineral oil 1-12.5 % OINT, Place 1 g rectally daily as needed for hemorrhoids or irritation, Disp: 1 Tube, Rfl: 0     COMPOUNDED NON-CONTROLLED SUBSTANCE (CMPD RX) - PHARMACY TO MIX COMPOUNDED MEDICATION, Take one capsule in the morning, Disp: 30 capsule, Rfl: 0     amitriptyline (ELAVIL) 50 MG tablet, Take 1 tablet (50 mg) by mouth At Bedtime, Disp: 30 tablet, Rfl: 11     lactulose 20 GM/30ML SOLN, Take 30 mLs by mouth 3 times daily as needed, Disp: 300 mL, Rfl: 3     LORazepam (ATIVAN) 1 MG tablet, Take 0.5 tablets (0.5 mg) by mouth 2 times daily as needed for other (atypical chest pain) Do not operate a vehicle after taking this medication, Disp: 60 tablet, Rfl: 0     hydrOXYzine (ATARAX) 25 MG tablet, Take 1-2 tablets (25-50 mg) by mouth every 6 hours as needed for anxiety, Disp: 60 tablet, Rfl: 1     ondansetron (ZOFRAN-ODT) 8 MG ODT tab, Take 1 tablet (8 mg) by mouth every 8 hours as needed for nausea, Disp: 60 tablet, Rfl: 6     " ONABOTULINUMTOXINA IJ, Inject 165 Units as directed once Lot# /C3 Expiration: 04/2020, Disp: , Rfl:      Colchicine 0.6 MG CAPS, Take 0.6 mg by mouth See Admin Instructions 2 capsules in AM and 1 capsule in PM, Disp: 90 capsule, Rfl: 3     diphenhydrAMINE (BENADRYL) 25 MG tablet, Take 1 tablet (25 mg) by mouth every 8 hours as needed for allergies, Disp: 30 tablet, Rfl: 0     linaclotide (LINZESS) 290 MCG capsule, Take 1 capsule (290 mcg) by mouth every morning (before breakfast), Disp: 90 capsule, Rfl: 1     sucralfate (CARAFATE) 1 GM/10ML suspension, Take 10 mLs (1 g) by mouth 4 times daily, Disp: 1200 mL, Rfl: 2     diclofenac (VOLTAREN) 1 % GEL topical gel, Apply 2-4 grams to affected area(s) up to 4 times per day as needed. This is an anti-inflammatory medication., Disp: 100 g, Rfl: 4     aspirin (ASPIRIN CHILDRENS) 81 MG chewable tablet, Take 81 mg by mouth as needed , Disp: , Rfl:      dicyclomine (BENTYL) 20 MG tablet, Take 1 tablet (20 mg) by mouth 4 times daily as needed, Disp: 60 tablet, Rfl: 1     omeprazole (PRILOSEC) 40 MG capsule, Take 1 capsule (40 mg) by mouth daily, Disp: 90 capsule, Rfl: 3     EPINEPHrine (EPIPEN 2-EAMON) 0.3 MG/0.3ML injection, Inject 0.3 mLs (0.3 mg) into the muscle as needed for anaphylaxis, Disp: 0.6 mL, Rfl: 3     norgestimate-ethinyl estradiol (ORTHO-CYCLEN, SPRINTEC) 0.25-35 MG-MCG per tablet, Take 1 tablet by mouth daily, Disp: 84 tablet, Rfl: 3     albuterol (PROAIR HFA/PROVENTIL HFA/VENTOLIN HFA) 108 (90 BASE) MCG/ACT Inhaler, Inhale 2 puffs into the lungs every 6 hours as needed for shortness of breath / dyspnea or wheezing, Disp: 1 Inhaler, Rfl: 1     promethazine (PHENERGAN) 25 MG tablet, Take 0.5-1 tablets (12.5-25 mg) by mouth every 6 hours as needed for nausea, Disp: 20 tablet, Rfl: 1     meclizine (ANTIVERT) 25 MG tablet, Take 1 tablet (25 mg) by mouth every 6 hours as needed for dizziness, Disp: 30 tablet, Rfl: 1     Magic Mouthwash (FV std formula) lidocaine  visc 2% 2.5mL/5mL & maalox/mylanta w/ simeth 2.5mL/5mL & diphenhydrAMINE 5mg/5mL, Swish and swallow 10 mLs in mouth every 6 hours as needed for mouth sores, Disp: 1 Bottle, Rfl: 1     clobetasol (TEMOVATE) 0.05 % cream, Apply topically 2 times daily, Disp: 60 g, Rfl: 0     botulinum toxin type A (BOTOX) 100 UNITS injection, Inject 200 Units into the muscle every 3 months, Disp: 200 Units, Rfl: 3     hyoscyamine (ANASPAZ,LEVSIN) 0.125 MG tablet, Take 1-2 tablets (125-250 mcg) by mouth every 4 hours as needed for cramping, Disp: 40 tablet, Rfl: 1     Aspirin-Acetaminophen-Caffeine (EXCEDRIN MIGRAINE PO), Take by mouth as needed , Disp: , Rfl:      hypromellose (GENTEAL) 0.3 % SOLN, 1 drop every hour as needed, Disp: , Rfl:      betamethasone valerate (VALISONE) 0.1 % cream, Apply topically 2 times daily, Disp: , Rfl:      carbamide peroxide (DEBROX) 6.5 % otic solution, 5 drops 2 times daily, Disp: , Rfl:      Lactase (LACTAID PO), Take by mouth daily, Disp: , Rfl:      lidocaine (XYLOCAINE) 2 % jelly, Apply 1 Tube topically daily Reported on 4/21/2017, Disp: , Rfl:      triamcinolone (KENALOG) 0.1 % cream, Apply sparingly to oral ulcers three times daily for 14 days as needed., Disp: 15 g, Rfl: 1     Allergies   Allergen Reactions     Amoxil [Penicillins] Rash     Dad unsure of reaction.     Bee Venom Anaphylaxis     Contrast Dye Rash     Contrast Media Ready-Box [facility], 04/09/2014.; Contrast Media Ready-Box [facility], 04/09/2014.  NOTE: this is a contrast media oral with iodine. Premedicate with methylpred standard for IV contrast, request barium contrast for oral contrast.     Kiwi Swelling     Orange Fruit [Citrus] Anaphylaxis     Pineapple Anaphylaxis, Difficulty breathing and Rash     Milk Protein Extract Hives     Reglan [Metoclopramide] Other (See Comments)     IV dose only, in ER, rapid heart rate.     Ace Inhibitors      Difficulty in breathing and GI upset     Amitiza [Lubiprostone] Nausea and Vomiting      Amoxicillin-Pot Clavulanate      Latex      Midazolam Unknown     Other reaction(s): Unknown  parent states that when pt takes this medication, she wakes up being very violent .  parent states that when pt takes this medication, she wakes up being very violent .     Nuts      Contrast Media Ready-Box [facility], 2014.; Contrast Media Ready-Box [facility], 2014.       Versed      Coming out of pelvic exam at age of 5, was kicking and screaming when coming out of the versed.     Adhesive Tape Rash     Azithromycin Hives and Rash     Cephalexin Itching and Rash     Itchy mouth  Itchy mouth     Keflex [Cephalexin-Fd&C Yellow #6] Hives     Lac Bovis Rash and GI Disturbance     Other reaction(s): Abdominal Pain        PHYSICAL EXAM:    Is currently on day 6/10 of multi-day migraine.  No facial asymmetry.      HPI:    Patient has had progression with regards to her seizures over the last 6-8 months. They have become more frequent and more severe. She has staring spells as well as tonic-clonic-like seizures that cause her to fall to the ground and convulse.    We reviewed the recommended safety guidelines for  Botox from any vaccine injection, such as the seasonal flu vaccine, by a minimum of 10-14 days with Samara Oropeza. She acknowledged understanding.    RESPONSE TO PREVIOUS TREATMENT:    Samara Oropeza received 165 units of Botox on 17.    Problems following the previous series of neurotoxin injections included:   Post-procedural headache for 2-3 days following injections.       BENEFITS BY PATIENT REPORT:    Patient reports that she had about 30% improvement with regards to her pain and dystonia. She had about 4-5 weeks of improvement of symptoms followed by a gradual reduction in benefit.       BOTULINUM NEUROTOXIN INJECTION PROCEDURES:    VERIFICATION OF PATIENT IDENTIFICATION AND PROCEDURE     Initials   Patient Name ses   Patient  ses   Procedure Verified by: ses     Prior to the start  of the procedure and with procedural staff participation, I verbally confirmed the patient s identity using two indicators, relevant allergies, that the procedure was appropriate and matched the consent or emergent situation, and that the correct equipment/implants were available. Immediately prior to starting the procedure I conducted the Time Out with the procedural staff and re-confirmed the patient s name, procedure, and site/side. 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     Her baseline symptoms have been recalcitrant to oral medications and conservative therapy.  She is here today for an injection of Botox.      GOAL OF PROCEDURE:  The goal of this procedure is to decrease pain  associated with dystonic movements.    TOTAL DOSE ADMINISTERED:  Dose Administered:  165 units Botox    Diluent Used:  0.25% Sensorcaine  BATCH: [id number]; Expiration: 12/2018  Total Volume of Diluent Used:  1.63 ml  Lot # /C3 with Expiration Date:  06/2020  NDC #: Botox 100u ([address]-29)    Medication guide was offered to patient and was declined.    CONSENT:  The risks, benefits, and treatment options were discussed with Samara Oropeza and she agreed to proceed.      Written consent was obtained by [facility].     EQUIPMENT USED:  Needle-37mm stimulating/recording  EMG/NCS Machine    SKIN PREPARATION:  Skin preparation was performed using an alcohol wipe.    GUIDANCE DESCRIPTION:  Electro-myographic guidance was necessary throughout the administration of Botox to neck and shoulder muscles to accurately identify all  areas of dystonic muscles while avoiding injection of non-dystonic muscles, neighboring nerves and nearby vascular structures.     AREA/MUSCLE INJECTED:  165 units of Botox dil 2:1 NS and 0.25% Sensorcaine    1 & 2. SHOULDER GIRDLE & NECK MUSCLES: 20 units Botox = Total Dose, 2:1 Dilution   Right Splenius - 5 units of Botox at 1 site/s.   Left Splenius - 5 units of Botox at 1 site/s.     Right Levator Scapulae - 5 units of Botox at 1 site/s (shoulder muscles).   Left Levator Scapulae - 5 units of Botox at 1 site/s (shoulder muscles).    3. HEAD & SCALP MUSCLES: 145 units Botox = Total Dose, 2:1 Dilution  Right Occipitalis - 10 units of Botox at 3 site/s.   Left Occipitalis - 10 units of Botox at 3 site/s.    Right Frontalis - 15 units of Botox at 4 site/s.  Left Frontalis - 15 units of Botox at 4 site/s.    Right Temporalis - 40 units of Botox at 8 site/s.  Left Temporalis - 40 units of Botox at 8 site/s.    Right  - 5 units of Botox at 1 site/s.   Left  - 5 units of Botox at 1 site/s.    Procerus - 5 units of Botox at 1 site/s.         RESPONSE TO PROCEDURE:  Samara Oropeza tolerated the procedure well and there were no immediate complications.  She was allowed to recover for an appropriate period of time and was discharged home in stable condition.    FOLLOW UP:  Samara Oropeza was asked to follow up by phone in 7-14 days with Marcelle Richardson PT, Care Coordinator or Vidya Dickinson RN, Care Coordinator, to report her response to this series of injections.  Based on the patient's previous response to this therapy, Samara Oropeza was rescheduled for the next series of injections in 12 weeks.    PLAN (Medication Changes, Therapy Orders, Work or Disability Issues, etc.): Will monitor response to today's injections and report.        Again, thank you for allowing me to participate in the care of your patient.      Sincerely,    Frederick Bender MD

## 2017-12-06 NOTE — PROGRESS NOTES
"BOTULINUM TOXIN PROCEDURE NOTE    Chief Complaint   Patient presents with     RECHECK     UMP- BOTOX- CERVICAL DYSTONIA     /63  Pulse 94  Temp 98  F (36.7  C) (Oral)  Resp 20  Ht 1.6 m (5' 3\")  Wt 66.2 kg (146 lb)  SpO2 99%  Breastfeeding? No  BMI 25.86 kg/m2    Current Outpatient Prescriptions:      apremilast (OTEZLA) 30 MG tablet, 30mg twice daily., Disp: 180 tablet, Rfl: 0     guanFACINE (TENEX) 1 MG tablet, Take 3 tablets (3 mg) by mouth 2 times daily, Disp: 270 tablet, Rfl: 3     diltiazem 2% in PLO cream, FV COMPOUNDED, 2% GEL, Apply small pea size amount three times daily to anus until pain is gone., Disp: 30 g, Rfl: 1     pramoxine-zinc oxide in mineral oil 1-12.5 % OINT, Place 1 g rectally daily as needed for hemorrhoids or irritation, Disp: 1 Tube, Rfl: 0     COMPOUNDED NON-CONTROLLED SUBSTANCE (CMPD RX) - PHARMACY TO MIX COMPOUNDED MEDICATION, Take one capsule in the morning, Disp: 30 capsule, Rfl: 0     amitriptyline (ELAVIL) 50 MG tablet, Take 1 tablet (50 mg) by mouth At Bedtime, Disp: 30 tablet, Rfl: 11     lactulose 20 GM/30ML SOLN, Take 30 mLs by mouth 3 times daily as needed, Disp: 300 mL, Rfl: 3     LORazepam (ATIVAN) 1 MG tablet, Take 0.5 tablets (0.5 mg) by mouth 2 times daily as needed for other (atypical chest pain) Do not operate a vehicle after taking this medication, Disp: 60 tablet, Rfl: 0     hydrOXYzine (ATARAX) 25 MG tablet, Take 1-2 tablets (25-50 mg) by mouth every 6 hours as needed for anxiety, Disp: 60 tablet, Rfl: 1     ondansetron (ZOFRAN-ODT) 8 MG ODT tab, Take 1 tablet (8 mg) by mouth every 8 hours as needed for nausea, Disp: 60 tablet, Rfl: 6     ONABOTULINUMTOXINA IJ, Inject 165 Units as directed once Lot# /C3 Expiration: 04/2020, Disp: , Rfl:      Colchicine 0.6 MG CAPS, Take 0.6 mg by mouth See Admin Instructions 2 capsules in AM and 1 capsule in PM, Disp: 90 capsule, Rfl: 3     diphenhydrAMINE (BENADRYL) 25 MG tablet, Take 1 tablet (25 mg) by mouth " every 8 hours as needed for allergies, Disp: 30 tablet, Rfl: 0     linaclotide (LINZESS) 290 MCG capsule, Take 1 capsule (290 mcg) by mouth every morning (before breakfast), Disp: 90 capsule, Rfl: 1     sucralfate (CARAFATE) 1 GM/10ML suspension, Take 10 mLs (1 g) by mouth 4 times daily, Disp: 1200 mL, Rfl: 2     diclofenac (VOLTAREN) 1 % GEL topical gel, Apply 2-4 grams to affected area(s) up to 4 times per day as needed. This is an anti-inflammatory medication., Disp: 100 g, Rfl: 4     aspirin (ASPIRIN CHILDRENS) 81 MG chewable tablet, Take 81 mg by mouth as needed , Disp: , Rfl:      dicyclomine (BENTYL) 20 MG tablet, Take 1 tablet (20 mg) by mouth 4 times daily as needed, Disp: 60 tablet, Rfl: 1     omeprazole (PRILOSEC) 40 MG capsule, Take 1 capsule (40 mg) by mouth daily, Disp: 90 capsule, Rfl: 3     EPINEPHrine (EPIPEN 2-EAMON) 0.3 MG/0.3ML injection, Inject 0.3 mLs (0.3 mg) into the muscle as needed for anaphylaxis, Disp: 0.6 mL, Rfl: 3     norgestimate-ethinyl estradiol (ORTHO-CYCLEN, SPRINTEC) 0.25-35 MG-MCG per tablet, Take 1 tablet by mouth daily, Disp: 84 tablet, Rfl: 3     albuterol (PROAIR HFA/PROVENTIL HFA/VENTOLIN HFA) 108 (90 BASE) MCG/ACT Inhaler, Inhale 2 puffs into the lungs every 6 hours as needed for shortness of breath / dyspnea or wheezing, Disp: 1 Inhaler, Rfl: 1     promethazine (PHENERGAN) 25 MG tablet, Take 0.5-1 tablets (12.5-25 mg) by mouth every 6 hours as needed for nausea, Disp: 20 tablet, Rfl: 1     meclizine (ANTIVERT) 25 MG tablet, Take 1 tablet (25 mg) by mouth every 6 hours as needed for dizziness, Disp: 30 tablet, Rfl: 1     Magic Mouthwash (FV std formula) lidocaine visc 2% 2.5mL/5mL & maalox/mylanta w/ simeth 2.5mL/5mL & diphenhydrAMINE 5mg/5mL, Swish and swallow 10 mLs in mouth every 6 hours as needed for mouth sores, Disp: 1 Bottle, Rfl: 1     clobetasol (TEMOVATE) 0.05 % cream, Apply topically 2 times daily, Disp: 60 g, Rfl: 0     botulinum toxin type A (BOTOX) 100 UNITS  injection, Inject 200 Units into the muscle every 3 months, Disp: 200 Units, Rfl: 3     hyoscyamine (ANASPAZ,LEVSIN) 0.125 MG tablet, Take 1-2 tablets (125-250 mcg) by mouth every 4 hours as needed for cramping, Disp: 40 tablet, Rfl: 1     Aspirin-Acetaminophen-Caffeine (EXCEDRIN MIGRAINE PO), Take by mouth as needed , Disp: , Rfl:      hypromellose (GENTEAL) 0.3 % SOLN, 1 drop every hour as needed, Disp: , Rfl:      betamethasone valerate (VALISONE) 0.1 % cream, Apply topically 2 times daily, Disp: , Rfl:      carbamide peroxide (DEBROX) 6.5 % otic solution, 5 drops 2 times daily, Disp: , Rfl:      Lactase (LACTAID PO), Take by mouth daily, Disp: , Rfl:      lidocaine (XYLOCAINE) 2 % jelly, Apply 1 Tube topically daily Reported on 4/21/2017, Disp: , Rfl:      triamcinolone (KENALOG) 0.1 % cream, Apply sparingly to oral ulcers three times daily for 14 days as needed., Disp: 15 g, Rfl: 1     Allergies   Allergen Reactions     Amoxil [Penicillins] Rash     Dad unsure of reaction.     Bee Venom Anaphylaxis     Contrast Dye Rash     Contrast Media Ready-Box [facility], 04/09/2014.; Contrast Media Ready-Box [facility], 04/09/2014.  NOTE: this is a contrast media oral with iodine. Premedicate with methylpred standard for IV contrast, request barium contrast for oral contrast.     Kiwi Swelling     Orange Fruit [Citrus] Anaphylaxis     Pineapple Anaphylaxis, Difficulty breathing and Rash     Milk Protein Extract Hives     Reglan [Metoclopramide] Other (See Comments)     IV dose only, in ER, rapid heart rate.     Ace Inhibitors      Difficulty in breathing and GI upset     Amitiza [Lubiprostone] Nausea and Vomiting     Amoxicillin-Pot Clavulanate      Latex      Midazolam Unknown     Other reaction(s): Unknown  parent states that when pt takes this medication, she wakes up being very violent .  parent states that when pt takes this medication, she wakes up being very violent .     Nuts      Contrast Media Ready-Box [facility], 04/09/2014.;  Contrast Media Ready-Box [facility], 2014.       Versed      Coming out of pelvic exam at age of 5, was kicking and screaming when coming out of the versed.     Adhesive Tape Rash     Azithromycin Hives and Rash     Cephalexin Itching and Rash     Itchy mouth  Itchy mouth     Keflex [Cephalexin-Fd&C Yellow #6] Hives     Lac Bovis Rash and GI Disturbance     Other reaction(s): Abdominal Pain        PHYSICAL EXAM:    Is currently on day 6/10 of multi-day migraine.  No facial asymmetry.      HPI:    Patient has had progression with regards to her seizures over the last 6-8 months. They have become more frequent and more severe. She has staring spells as well as tonic-clonic-like seizures that cause her to fall to the ground and convulse.    We reviewed the recommended safety guidelines for  Botox from any vaccine injection, such as the seasonal flu vaccine, by a minimum of 10-14 days with Samara Oropeza. She acknowledged understanding.    RESPONSE TO PREVIOUS TREATMENT:    Samara Oropeza received 165 units of Botox on 17.    Problems following the previous series of neurotoxin injections included:   Post-procedural headache for 2-3 days following injections.       BENEFITS BY PATIENT REPORT:    Patient reports that she had about 30% improvement with regards to her pain and dystonia. She had about 4-5 weeks of improvement of symptoms followed by a gradual reduction in benefit.       BOTULINUM NEUROTOXIN INJECTION PROCEDURES:    VERIFICATION OF PATIENT IDENTIFICATION AND PROCEDURE     Initials   Patient Name ses   Patient  ses   Procedure Verified by: ses     Prior to the start of the procedure and with procedural staff participation, I verbally confirmed the patient s identity using two indicators, relevant allergies, that the procedure was appropriate and matched the consent or emergent situation, and that the correct equipment/implants were available. Immediately prior to starting the  procedure I conducted the Time Out with the procedural staff and re-confirmed the patient s name, procedure, and site/side. 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     Her baseline symptoms have been recalcitrant to oral medications and conservative therapy.  She is here today for an injection of Botox.      GOAL OF PROCEDURE:  The goal of this procedure is to decrease pain  associated with dystonic movements.    TOTAL DOSE ADMINISTERED:  Dose Administered:  165 units Botox    Diluent Used:  0.25% Sensorcaine  BATCH: [id number]; Expiration: 12/2018  Total Volume of Diluent Used:  1.63 ml  Lot # /C3 with Expiration Date:  06/2020  NDC #: Botox 100u ([address]-53)    Medication guide was offered to patient and was declined.    CONSENT:  The risks, benefits, and treatment options were discussed with Samara Oropeza and she agreed to proceed.      Written consent was obtained by [facility].     EQUIPMENT USED:  Needle-37mm stimulating/recording  EMG/NCS Machine    SKIN PREPARATION:  Skin preparation was performed using an alcohol wipe.    GUIDANCE DESCRIPTION:  Electro-myographic guidance was necessary throughout the administration of Botox to neck and shoulder muscles to accurately identify all areas of dystonic muscles while avoiding injection of non-dystonic muscles, neighboring nerves and nearby vascular structures.     AREA/MUSCLE INJECTED:  165 units of Botox dil 2:1 NS and 0.25% Sensorcaine    1 & 2. SHOULDER GIRDLE & NECK MUSCLES: 20 units Botox = Total Dose, 2:1 Dilution   Right Splenius - 5 units  of Botox at 1 site/s.   Left Splenius - 5 units of Botox at 1 site/s.     Right Levator Scapulae - 5 units of Botox at 1 site/s (shoulder muscles).   Left Levator Scapulae - 5 units of Botox at 1 site/s (shoulder muscles).    3. HEAD & SCALP MUSCLES: 145 units Botox = Total Dose, 2:1 Dilution  Right Occipitalis - 10 units of Botox at 3 site/s.   Left Occipitalis - 10 units of Botox at 3 site/s.    Right Frontalis - 15 units of Botox at 4 site/s.  Left Frontalis - 15 units of Botox at 4 site/s.    Right Temporalis - 40 units of Botox at 8 site/s.  Left Temporalis - 40 units of Botox at 8 site/s.    Right  - 5 units of Botox at 1 site/s.   Left  - 5 units of Botox at 1 site/s.    Procerus - 5 units of Botox at 1 site/s.         RESPONSE TO PROCEDURE:  Samara Oropeza tolerated the procedure well and there were no immediate complications.  She was allowed to recover for an appropriate period of time and was discharged home in stable condition.    FOLLOW UP:  Samara Oropeza was asked to follow up by phone in 7-14 days with Marcelle Richardson PT, Care Coordinator or Vidya Dickinson RN, Care Coordinator, to report her response to this series of injections.  Based on the patient's previous response to this therapy, Samara Oropeza was rescheduled for the next series of injections in 12 weeks.    PLAN (Medication Changes, Therapy Orders, Work or Disability Issues, etc.): Will monitor response to today's injections and report.

## 2017-12-06 NOTE — MR AVS SNAPSHOT
After Visit Summary   12/6/2017    Samara Oropeza    MRN: [id number]           Patient Information     Date Of Birth          1995        Visit Information        Provider Department      12/6/2017 3:00 PM Frederick Bender MD [facility]        Today's Diagnoses     Intractable chronic migraine without aura and without status migrainosus    -  1       Follow-ups after your visit        Your next 10 appointments already scheduled     Dec 12, 2017  2:45 PM CST   [facility]st [facility]   [facility]    [address]  [address]  [facility] [address] [phone]            Dec 15, 2017  9:15 AM CST   Video Hookup Visit with [facility] EEG TECH 4   [facility] EEG ([facility])    [facility]  [address] [facility] [address]   [phone]           [place]: Your appointment is scheduled at [facility]. [address] [place] [address]            Dec 18, 2017 10:00 AM CST   (Arrive by 9:45 AM)   New Patient Visit with Tj Mojica MD   [facility] ([facility])    [address] [place] [address]  [address]th [facility] [id number] [phone]            Jan 16, 2018 11:30 AM CST   Return Visit with Austen Marquez MD   [facility] ([facility])    [address] [id number] [phone]            Jan 17, 2018  2:20 PM CST   (Arrive by 2:05 PM)   New Patient Visit with Estela Cuenca MD   [facility] ([facility])    [address]  [address]th [facility] [id number] [phone]            Jan 24, 2018  2:00 PM CST   (Arrive by 1:45 PM)   Return Visit with EVI Vizcaino [facility] (  [facility])    [address] [facility]  4th [facility] [address]   [phone]            Feb 13, 2018  2:30 PM CST   (Arrive by 2:15 PM)   Return Seizure with Sigifredo Flores MD   [facility] ([facility])    [address] [place] [address]  3rd [facility] [address]   [phone]            Feb 28, 2018  3:00 PM CST   (Arrive by 2:45 PM)   Return Botox with Frederick Bender MD   [facility] ([facility])    [address] [place] [address]  3rd [facility] [address] [phone]              Who to contact     Please call your clinic at [phone] to:    Ask questions about your health    Make or cancel appointments    Discuss your medicines    Learn about your test results    Speak to your doctor   If you have compliments or concerns about an experience at your clinic, or if you wish to file a complaint, please contact [facility] Patient Relations at [phone] or email us at [email or web address]         Additional Information About Your Visit        [employer]     [employer] gives you secure access to your electronic health record. If you see a primary care provider, you can also send messages to your care team and make appointments. If you have questions, please call your primary care clinic.  If you do not have a primary care provider, please call [phone] and they will assist you.      [employer] is an electronic gateway that provides easy, online access to your medical records. With [employer], you can request a clinic appointment, read your test results, renew a prescription or communicate with your care team.     To access your existing account, please contact your [facility] or call [phone] for assistance.        Care EveryWhere ID     This is your Care EveryWhere ID. This could be used by other  "organizations to access your [facility] medical records  [id number]        Your Vitals Were     Pulse Temperature Respirations Height Pulse Oximetry Breastfeeding?    94 98  F (36.7  C) (Oral) 20 1.6 m (5' 3\") 99% No    BMI (Body Mass Index)                   25.86 kg/m2            Blood Pressure from Last 3 Encounters:   12/06/17 109/63   11/09/17 114/82   11/03/17 121/89    Weight from Last 3 Encounters:   12/06/17 66.2 kg (146 lb)   11/09/17 66.2 kg (146 lb)   11/03/17 66.7 kg (147 lb)              We Performed the Following     HC CHEMODENERVATE FACIAL,TRIGERM,NERV MIGRAINE     Needle EMG Guide w/Chemodenervation (47578)        Primary Care Provider Office Phone # Fax #    Sonja Finleywendy Abreu, APRN [facility] [phone]       [address] [facility] [address]  [facility] [address]        Equal Access to Services     FRANK GALEANO : Hadii aad ku hadasho Soomaali, waaxda luqadaha, qaybta kaalmada adeegyada, waxay idiin haysophian maddie rodriguez . So [facility] [phone].    ATENCIÓN: Si habla español, tiene a livingston disposición servicios gratuitos de asistencia lingüística. Llame al [phone].    We comply with applicable federal civil rights laws and [place] laws. We do not discriminate on the basis of race, color, national origin, age, disability, sex, sexual orientation, or gender identity.            Thank you!     Thank you for choosing [facility]  for your care. Our goal is always to provide you with excellent care. Hearing back from our patients is one way we can continue to improve our services. Please take a few minutes to complete the written survey that you may receive in the mail after your visit with us. Thank you!             Your Updated Medication List - Protect others around you: Learn how to safely use, store and throw away your medicines at www.disposemymeds.org.          This list is accurate as of: 12/6/17  4:07 PM.  Always use your most recent med list.          "          Brand Name Dispense Instructions for use Diagnosis    albuterol 108 (90 BASE) MCG/ACT Inhaler    PROAIR HFA/PROVENTIL HFA/VENTOLIN HFA    1 Inhaler    Inhale 2 puffs into the lungs every 6 hours as needed for shortness of breath / dyspnea or wheezing    Atypical chest pain       amitriptyline 50 MG tablet    ELAVIL    30 tablet    Take 1 tablet (50 mg) by mouth At Bedtime    Abdominal pain, generalized, Insomnia due to medical condition       apremilast 30 MG tablet    OTEZLA    180 tablet    30mg twice daily.    Behcet's disease (H)       ASPIRIN CHILDRENS 81 MG chewable tablet   Generic drug:  aspirin      Take 81 mg by mouth as needed        betamethasone valerate 0.1 % cream    VALISONE     Apply topically 2 times daily        * botulinum toxin type A 100 UNITS injection    BOTOX    200 Units    Inject 200 Units into the muscle every 3 months    Cervical dystonia       * ONABOTULINUMTOXINA IJ      Inject 165 Units as directed once Lot# /C3 Expiration: 04/2020        * BOTOX IJ      Inject 165 Units into the muscle once Lot /C3 Exp 06/2020        carbamide peroxide 6.5 % otic solution    DEBROX     5 drops 2 times daily        clobetasol 0.05 % cream    TEMOVATE    60 g    Apply topically 2 times daily    Folliculitis       Colchicine 0.6 MG Caps     90 capsule    Take 0.6 mg by mouth See Admin Instructions 2 capsules in AM and 1 capsule in PM    Behcet's syndrome (H)       COMPOUNDED NON-CONTROLLED SUBSTANCE - PHARMACY TO MIX COMPOUNDED MEDICATION    CMPD RX    30 capsule    Take one capsule in the morning    Fibromyalgia       diclofenac 1 % Gel topical gel    VOLTAREN    100 g    Apply 2-4 grams to affected area(s) up to 4 times per day as needed. This is an anti-inflammatory medication.    Polyarthralgia       dicyclomine 20 MG tablet    BENTYL    60 tablet    Take 1 tablet (20 mg) by mouth 4 times daily as needed    Abdominal cramping       diltiazem 2% in PLO cream (FV COMPOUNDED) 2% Gel      30 g    Apply small pea size amount three times daily to anus until pain is gone.    Anal fissure       diphenhydrAMINE 25 MG tablet    BENADRYL    30 tablet    Take 1 tablet (25 mg) by mouth every 8 hours as needed for allergies        EPINEPHrine 0.3 MG/0.3ML injection 2-pack    EPIPEN 2-EAMON    0.6 mL    Inject 0.3 mLs (0.3 mg) into the muscle as needed for anaphylaxis    Hx of bee sting allergy       EXCEDRIN MIGRAINE PO      Take by mouth as needed        guanFACINE 1 MG tablet    TENEX    270 tablet    Take 3 tablets (3 mg) by mouth 2 times daily    Tic       hydrOXYzine 25 MG tablet    ATARAX    60 tablet    Take 1-2 tablets (25-50 mg) by mouth every 6 hours as needed for anxiety    TAHIR (generalized anxiety disorder)       hyoscyamine 0.125 MG tablet    ANASPAZ/LEVSIN    40 tablet    Take 1-2 tablets (125-250 mcg) by mouth every 4 hours as needed for cramping    Abdominal pain, generalized       hypromellose 0.3 % Soln ophthalmic solution    GENTEAL     1 drop every hour as needed        LACTAID PO      Take by mouth daily        lactulose 20 GM/30ML Soln     300 mL    Take 30 mLs by mouth 3 times daily as needed    Constipation, unspecified constipation type       lidocaine 2 % topical gel    XYLOCAINE     Apply 1 Tube topically daily Reported on 4/21/2017        lidocaine visc 2% 2.5mL/5mL & maalox/mylanta w/ simeth 2.5mL/5mL & diphenhydrAMINE 5mg/5mL Susp suspension    [facility]    1 Bottle    Swish and swallow 10 mLs in mouth every 6 hours as needed for mouth sores    Behcet's syndrome (H)       linaclotide 290 MCG capsule    LINZESS    90 capsule    Take 1 capsule (290 mcg) by mouth every morning (before breakfast)    Irritable bowel syndrome       LORazepam 1 MG tablet    ATIVAN    60 tablet    Take 0.5 tablets (0.5 mg) by mouth 2 times daily as needed for other (atypical chest pain) Do not operate a vehicle after taking this medication    Chronic abdominal pain       meclizine 25 MG  tablet    ANTIVERT    30 tablet    Take 1 tablet (25 mg) by mouth every 6 hours as needed for dizziness    Nausea       norgestimate-ethinyl estradiol 0.25-35 MG-MCG per tablet    ORTHO-CYCLEN, SPRINTEC    84 tablet    Take 1 tablet by mouth daily    General counseling for prescription of oral contraceptives       omeprazole 40 MG capsule    priLOSEC    90 capsule    Take 1 capsule (40 mg) by mouth daily    Gastroesophageal reflux disease, esophagitis presence not specified       ondansetron 8 MG ODT tab    ZOFRAN-ODT    60 tablet    Take 1 tablet (8 mg) by mouth every 8 hours as needed for nausea    Non-intractable vomiting with nausea, unspecified vomiting type, Hematemesis, presence of nausea not specified       pramoxine-zinc oxide in mineral oil 1-12.5 % Oint     1 Tube    Place 1 g rectally daily as needed for hemorrhoids or irritation        promethazine 25 MG tablet    PHENERGAN    20 tablet    Take 0.5-1 tablets (12.5-25 mg) by mouth every 6 hours as needed for nausea    Intractable chronic migraine without aura and without status migrainosus       sucralfate 1 GM/10ML suspension    CARAFATE    1200 mL    Take 10 mLs (1 g) by mouth 4 times daily    Bile reflux gastritis, Nausea       triamcinolone 0.1 % cream    KENALOG    15 g    Apply sparingly to oral ulcers three times daily for 14 days as needed.    Behcet's syndrome (H)       * Notice:  This list has 3 medication(s) that are the same as other medications prescribed for you. Read the directions carefully, and ask your doctor or other care provider to review them with you.

## 2017-12-06 NOTE — NURSING NOTE
Chief Complaint   Patient presents with     RECHECK     UMP- BOTOX- CERVICAL DYSTONIA     Dane Betancur, CMA

## 2017-12-10 DIAGNOSIS — Z30.09 GENERAL COUNSELING FOR PRESCRIPTION OF ORAL CONTRACEPTIVES: ICD-10-CM

## 2017-12-12 ENCOUNTER — OFFICE VISIT (OUTPATIENT)
Dept: PSYCHIATRY | Facility: CLINIC | Age: 22
End: 2017-12-12
Payer: COMMERCIAL

## 2017-12-12 VITALS
BODY MASS INDEX: 27.64 KG/M2 | HEART RATE: 94 BPM | OXYGEN SATURATION: 100 % | HEIGHT: 63 IN | DIASTOLIC BLOOD PRESSURE: 64 MMHG | SYSTOLIC BLOOD PRESSURE: 110 MMHG | TEMPERATURE: 98.6 F | WEIGHT: 156 LBS

## 2017-12-12 DIAGNOSIS — F41.1 GAD (GENERALIZED ANXIETY DISORDER): Primary | ICD-10-CM

## 2017-12-12 DIAGNOSIS — G47.9 SLEEP DISTURBANCE: ICD-10-CM

## 2017-12-12 DIAGNOSIS — F43.10 PTSD (POST-TRAUMATIC STRESS DISORDER): ICD-10-CM

## 2017-12-12 PROCEDURE — 99214 OFFICE O/P EST MOD 30 MIN: CPT | Performed by: NURSE PRACTITIONER

## 2017-12-12 RX ORDER — PRAZOSIN HYDROCHLORIDE 1 MG/1
1 CAPSULE ORAL AT BEDTIME
Qty: 30 CAPSULE | Refills: 1 | Status: SHIPPED | OUTPATIENT
Start: 2017-12-12 | End: 2018-02-10

## 2017-12-12 RX ORDER — NORGESTIMATE AND ETHINYL ESTRADIOL 0.25-0.035
KIT ORAL
Qty: 84 TABLET | Refills: 2 | Status: SHIPPED | OUTPATIENT
Start: 2017-12-12 | End: 2018-04-16

## 2017-12-12 RX ORDER — HYDROXYZINE HYDROCHLORIDE 25 MG/1
25-50 TABLET, FILM COATED ORAL EVERY 6 HOURS PRN
Qty: 90 TABLET | Refills: 1 | Status: SHIPPED | OUTPATIENT
Start: 2017-12-12 | End: 2018-02-27

## 2017-12-12 ASSESSMENT — ANXIETY QUESTIONNAIRES
5. BEING SO RESTLESS THAT IT IS HARD TO SIT STILL: SEVERAL DAYS
4. TROUBLE RELAXING: NEARLY EVERY DAY
6. BECOMING EASILY ANNOYED OR IRRITABLE: SEVERAL DAYS
1. FEELING NERVOUS, ANXIOUS, OR ON EDGE: MORE THAN HALF THE DAYS
GAD7 TOTAL SCORE: 10
2. NOT BEING ABLE TO STOP OR CONTROL WORRYING: SEVERAL DAYS
7. FEELING AFRAID AS IF SOMETHING AWFUL MIGHT HAPPEN: SEVERAL DAYS
7. FEELING AFRAID AS IF SOMETHING AWFUL MIGHT HAPPEN: SEVERAL DAYS
GAD7 TOTAL SCORE: 10
GAD7 TOTAL SCORE: 10
3. WORRYING TOO MUCH ABOUT DIFFERENT THINGS: SEVERAL DAYS

## 2017-12-12 ASSESSMENT — PATIENT HEALTH QUESTIONNAIRE - PHQ9
SUM OF ALL RESPONSES TO PHQ QUESTIONS 1-9: 12
SUM OF ALL RESPONSES TO PHQ QUESTIONS 1-9: 12
10. IF YOU CHECKED OFF ANY PROBLEMS, HOW DIFFICULT HAVE THESE PROBLEMS MADE IT FOR YOU TO DO YOUR WORK, TAKE CARE OF THINGS AT HOME, OR GET ALONG WITH OTHER PEOPLE: VERY DIFFICULT

## 2017-12-12 NOTE — PROGRESS NOTES
"    Outpatient Psychiatric Progress Note    Name: Samara Oropeza   : 1995                    Primary Care Provider: EVI Cardona CNP - last visit 2017  Therapist: Layla Browne  - last visit 2017 discharged  [facility]    PHQ-9 scores:  PHQ-9 SCORE 2017 10/9/2017 2017   Total Score 16 17 12       TAHIR-7 scores:  TAHIR-7 SCORE 2017 10/9/2017 2017   Total Score 17 19 10       Patient Identification:  Patient is a 22 year old year old, partnered / significant other  Two or more races American female  who presents for return visit with me.  Patient is currently employed part time. Patient attended the session alone. Patient prefers to be called: \"Samara\".    Interim History:    I last saw Samara Oropeza for outpatient psychiatry Return Visit on 10/9/2017.     During that appointment, we Continue Intuniv (guanfacine) 3 mg two times per day per primary care provider for Tourette's Syndrome.    Continue Elavil (amitriptyline) 50 mg by mouth daily at bedtime for sleep and pain.     Continue Vistaril/Atarax (hydroxyzine) 25- 50 mg by mouth up to 4 times daily for anxiety.      Current medications include:   Current Outpatient Prescriptions   Medication Sig     SPRINTEC 28 0.25-35 MG-MCG per tablet TAKE 1 TABLET BY MOUTH ONCE DAILY.     OnabotulinumtoxinA (BOTOX IJ) Inject 165 Units into the muscle once Lot /C3  Exp 2020     apremilast (OTEZLA) 30 MG tablet 30mg twice daily.     guanFACINE (TENEX) 1 MG tablet Take 3 tablets (3 mg) by mouth 2 times daily     diltiazem 2% in PLO cream, FV COMPOUNDED, 2% GEL Apply small pea size amount three times daily to anus until pain is gone.     pramoxine-zinc oxide in mineral oil 1-12.5 % OINT Place 1 g rectally daily as needed for hemorrhoids or irritation     amitriptyline (ELAVIL) 50 MG tablet Take 1 tablet (50 mg) by mouth At Bedtime     lactulose 20 GM/30ML SOLN Take 30 " mLs by mouth 3 times daily as needed     LORazepam (ATIVAN) 1 MG tablet Take 0.5 tablets (0.5 mg) by mouth 2 times daily as needed for other (atypical chest pain) Do not operate a vehicle after taking this medication     hydrOXYzine (ATARAX) 25 MG tablet Take 1-2 tablets (25-50 mg) by mouth every 6 hours as needed for anxiety     ondansetron (ZOFRAN-ODT) 8 MG ODT tab Take 1 tablet (8 mg) by mouth every 8 hours as needed for nausea     ONABOTULINUMTOXINA IJ Inject 165 Units as directed once Lot# /C3  Expiration: 04/2020     Colchicine 0.6 MG CAPS Take 0.6 mg by mouth See Admin Instructions 2 capsules in AM and 1 capsule in PM     diphenhydrAMINE (BENADRYL) 25 MG tablet Take 1 tablet (25 mg) by mouth every 8 hours as needed for allergies     linaclotide (LINZESS) 290 MCG capsule Take 1 capsule (290 mcg) by mouth every morning (before breakfast)     sucralfate (CARAFATE) 1 GM/10ML suspension Take 10 mLs (1 g) by mouth 4 times daily     diclofenac (VOLTAREN) 1 % GEL topical gel Apply 2-4 grams to affected area(s) up to 4 times per day as needed. This is an anti-inflammatory medication.     aspirin (ASPIRIN CHILDRENS) 81 MG chewable tablet Take 81 mg by mouth as needed      dicyclomine (BENTYL) 20 MG tablet Take 1 tablet (20 mg) by mouth 4 times daily as needed     omeprazole (PRILOSEC) 40 MG capsule Take 1 capsule (40 mg) by mouth daily     EPINEPHrine (EPIPEN 2-EAMON) 0.3 MG/0.3ML injection Inject 0.3 mLs (0.3 mg) into the muscle as needed for anaphylaxis     albuterol (PROAIR HFA/PROVENTIL HFA/VENTOLIN HFA) 108 (90 BASE) MCG/ACT Inhaler Inhale 2 puffs into the lungs every 6 hours as needed for shortness of breath / dyspnea or wheezing     promethazine (PHENERGAN) 25 MG tablet Take 0.5-1 tablets (12.5-25 mg) by mouth every 6 hours as needed for nausea     meclizine (ANTIVERT) 25 MG tablet Take 1 tablet (25 mg) by mouth every 6 hours as needed for dizziness     Magic Mouthwash (FV std formula) lidocaine visc 2%  "2.5mL/5mL & maalox/mylanta w/ simeth 2.5mL/5mL & diphenhydrAMINE 5mg/5mL Swish and swallow 10 mLs in mouth every 6 hours as needed for mouth sores     clobetasol (TEMOVATE) 0.05 % cream Apply topically 2 times daily     botulinum toxin type A (BOTOX) 100 UNITS injection Inject 200 Units into the muscle every 3 months     hyoscyamine (ANASPAZ,LEVSIN) 0.125 MG tablet Take 1-2 tablets (125-250 mcg) by mouth every 4 hours as needed for cramping     Aspirin-Acetaminophen-Caffeine (EXCEDRIN MIGRAINE PO) Take by mouth as needed      hypromellose (GENTEAL) 0.3 % SOLN 1 drop every hour as needed     betamethasone valerate (VALISONE) 0.1 % cream Apply topically 2 times daily     carbamide peroxide (DEBROX) 6.5 % otic solution 5 drops 2 times daily     Lactase (LACTAID PO) Take by mouth daily     lidocaine (XYLOCAINE) 2 % jelly Apply 1 Tube topically daily Reported on 4/21/2017     triamcinolone (KENALOG) 0.1 % cream Apply sparingly to oral ulcers three times daily for 14 days as needed.     COMPOUNDED NON-CONTROLLED SUBSTANCE (CMPD RX) - PHARMACY TO MIX COMPOUNDED MEDICATION Take one capsule in the morning     No current facility-administered medications for this visit.        The [place] Prescription Monitoring Program has been reviewed and there are no concerns about diversionary activity for controlled substances at this time.  Ativan (lorazepam) 1 mg 60 tabs filled July 2017 and October 11, 2017. Most recent fill from [facility] in [place].     I was able to review most recent Primary Care Provider, specialty provider, and therapy visit notes that I have access to.     Samara Oropeza reports mood has been: \"not very good\" reports frustrated and irritable. No hypomania or rudy.   Anxiety has been: \"pretty anxious\" anxiety about health. Reports panic every other week \"out of nowhere\" and last for about 5 - 15 minutes. Has been taking the Vistaril/Atarax (hydroxyzine) daily in the morning and sometimes " "when has panic. Reports less chest pains. Ativan (lorazepam) has only used 4 times for pain over the last one month.   Sleep has been: \"okay, but increased pain the last few days\" nightmares are worse lately.   PHQ9 and GAD7 scores were reviewed today. Scores have reduced.   Medication side effects: Denies  Current stressors include: Occupational Difficulties, Medical Difficulties, Financial Difficulties, Recent Moving, Family Relationships  Coping mechanisms and supports include: Therapy, Animal Rescue, Deep Breathing     Previous medication trials include but not limited to:  Vistaril/Atarax (hydroxyzine)  Intuniv (guanfacine)  Elavil (amitriptyline)   Ativan (lorazepam)  Buspar (buspirone)   Neurontin (gabapentin)   Melatonin     Past Medical History:   Diagnosis Date     Anxiety      Arthritis      Behcet's disease (H)      Cervical adenitis May 2010     Chronic abdominal pain      Constipation, chronic 1994     Gastro-oesophageal reflux disease      Gastroparesis      Migraines      Neuromuscular disorder (H)     fibramyalgia     Palpitations      Seizure (H)      Seizures (H)     unknown etiology     Syncope      Tourette's       has a past medical history of Anxiety; Arthritis; Behcet's disease (H); Cervical adenitis (May 2010); Chronic abdominal pain; Constipation, chronic (1994); Gastro-oesophageal reflux disease; Gastroparesis; Migraines; Neuromuscular disorder (H); Palpitations; Seizure (H); Seizures (H); Syncope; and Tourette's.    Social History:  Current Living situation:  [place], [place] with boyfriend's parents until place is ready in [place]. Feels safe at home.  Current use of drugs or alcohol: Alcohol recent binge drinking episode of 10 shots and got sick, ambulance was called, so drinking less alcohol lately.   Tobacco use: No  Caffeine: Yes- 2 products per day  Recovery Programming Involvement: Not Applicable    Vital Signs:   /64 (BP Location: Left arm, Patient Position: " "Sitting, Cuff Size: Adult Regular)  Pulse 94  Temp 98.6  F (37  C) (Oral)  Ht 5' 3\" (1.6 m)  Wt 156 lb (70.8 kg)  LMP 11/28/2017 (Exact Date)  SpO2 100%  Breastfeeding? No  BMI 27.63 kg/m2    Labs:  Most recent laboratory results reviewed and the pertinent results include:   Admission on 11/03/2017, Discharged on 11/03/2017   Component Date Value Ref Range Status     WBC 11/03/2017 5.4  4.0 - 11.0 10e9/L Final     RBC Count 11/03/2017 4.59  3.8 - 5.2 10e12/L Final     Hemoglobin 11/03/2017 13.4  11.7 - 15.7 g/dL Final     Hematocrit 11/03/2017 40.7  35.0 - 47.0 % Final     MCV 11/03/2017 89  78 - 100 fl Final     MCH 11/03/2017 29.2  26.5 - 33.0 pg Final     MCHC 11/03/2017 32.9  31.5 - 36.5 g/dL Final     RDW 11/03/2017 13.1  10.0 - 15.0 % Final     Platelet Count 11/03/2017 251  150 - 450 10e9/L Final     Diff Method 11/03/2017 Automated Method   Final     % Neutrophils 11/03/2017 55.2  % Final     % Lymphocytes 11/03/2017 37.2  % Final     % Monocytes 11/03/2017 5.0  % Final     % Eosinophils 11/03/2017 2.2  % Final     % Basophils 11/03/2017 0.2  % Final     % Immature Granulocytes 11/03/2017 0.2  % Final     Nucleated RBCs 11/03/2017 0  0 /100 Final     Absolute Neutrophil 11/03/2017 3.0  1.6 - 8.3 10e9/L Final     Absolute Lymphocytes 11/03/2017 2.0  0.8 - 5.3 10e9/L Final     Absolute Monocytes 11/03/2017 0.3  0.0 - 1.3 10e9/L Final     Absolute Eosinophils 11/03/2017 0.1  0.0 - 0.7 10e9/L Final     Absolute Basophils 11/03/2017 0.0  0.0 - 0.2 10e9/L Final     Abs Immature Granulocytes 11/03/2017 0.0  0 - 0.4 10e9/L Final     Absolute Nucleated RBC 11/03/2017 0.0   Final     Sodium 11/03/2017 140  133 - 144 mmol/L Final     Potassium 11/03/2017 3.8  3.4 - 5.3 mmol/L Final     Chloride 11/03/2017 106  94 - 109 mmol/L Final     Carbon Dioxide 11/03/2017 27  20 - 32 mmol/L Final     Anion Gap 11/03/2017 7  3 - 14 mmol/L Final     Glucose 11/03/2017 82  70 - 99 mg/dL Final     Urea Nitrogen 11/03/2017 10  " 7 - 30 mg/dL Final     Creatinine 11/03/2017 0.78  0.52 - 1.04 mg/dL Final     GFR Estimate 11/03/2017 >90  >60 mL/min/1.7m2 Final    Non  GFR Calc     GFR Estimate If Black 11/03/2017 >90  >60 mL/min/1.7m2 Final    African American GFR Calc     Calcium 11/03/2017 9.4  8.5 - 10.1 mg/dL Final     Bilirubin Total 11/03/2017 0.3  0.2 - 1.3 mg/dL Final     Albumin 11/03/2017 4.0  3.4 - 5.0 g/dL Final     Protein Total 11/03/2017 7.7  6.8 - 8.8 g/dL Final     Alkaline Phosphatase 11/03/2017 99  40 - 150 U/L Final     ALT 11/03/2017 40  0 - 50 U/L Final     AST 11/03/2017 34  0 - 45 U/L Final     Lipase 11/03/2017 175  73 - 393 U/L Final     HCG Qual Urine 11/03/2017 Negative  neg Final     Internal QC OK 11/03/2017 Yes   Final     Color Urine 11/03/2017 Light Yellow   Final     Appearance Urine 11/03/2017 Clear   Final     Glucose Urine 11/03/2017 Negative  NEG^Negative mg/dL Final     Bilirubin Urine 11/03/2017 Negative  NEG^Negative Final     Ketones Urine 11/03/2017 Negative  NEG^Negative mg/dL Final     Specific Gravity Urine 11/03/2017 1.007  1.003 - 1.035 Final     Blood Urine 11/03/2017 Negative  NEG^Negative Final     pH Urine 11/03/2017 7.5* 5.0 - 7.0 pH Final     Protein Albumin Urine 11/03/2017 Negative  NEG^Negative mg/dL Final     Urobilinogen mg/dL 11/03/2017 Normal  0.0 - 2.0 mg/dL Final     Nitrite Urine 11/03/2017 Negative  NEG^Negative Final     Leukocyte Esterase Urine 11/03/2017 Negative  NEG^Negative Final     Source 11/03/2017 Midstream Urine   Final          Review of Systems:  10 systems (general, cardiovascular, respiratory, eyes, ENT, endocrine, GI, , M/S, neurological) were reviewed. Most pertinent finding(s) is/are: chronic abdominal pain, migraines- has ongoing Botox injections,  neurology seizure like episodes - most recent episode about 1.5 weeks ago- upcoming EEG, continue to happen every 1-2 weeks, ongoing nausea, 10 pound weight gain in 6 days. The remaining systems are  "all unremarkable.      Mental Status Examination:  Appearance:  awake, alert, adequately groomed, appeared stated age, wears makeup, on time, alone  Attitude:  cooperative   Eye Contact:  adequate  Gait and Station: Normal, No assistive Devices used and No dizziness or falls  Psychomotor Behavior:  no evidence of tardive dyskinesia, dystonia, or tics  Oriented to:  time, person, and place  Attention Span and Concentration:  Normal  Speech:  clear, coherent, regular rate, regular rhythm and fluent  Mood:   \"not very good\"  Affect:  mood congruent, brighter than last visit, calm  Associations:  no loose associations  Thought Process:  logical, linear and goal oriented  Thought Content:  no evidence of suicidal ideation or homicidal ideation, no evidence of psychotic thought and Appropriate to Interview  Recent and Remote Memory:  Intact to interview. Not formally assessed. No amnesia.  Fund of Knowledge: appropriate  Insight:  fair  Judgment:  intact- adequate for safety  Impulse Control:  intact      Suicide Risk Assessment:  Today Samraa Oropeza reports many psychosocial stressors, anxiety, and mood lability. Ongoing psychosocial stress and medical comorbidities.  In addition, there are notable risk factors for self-harm, including age, anxiety, family history and comorbid medical condition of chronic pain. However, risk is mitigated by commitment to family, absence of past attempts, ability to volunteer a safety plan, history of seeking help when needed, future oriented, denies suicidal intent or plan and denies homicidal ideation, intent, or plan. Therefore, based on all available evidence including the factors cited above, Samara Oropeza does not appear to be at imminent risk for self-harm, does not meet criteria for a 72-hr hold, and therefore remains appropriate for ongoing outpatient level of care.  A thorough assessment of risk factors related to suicide and self-harm have been reviewed and are " noted above. Local community safety resources reviewed and printed for patient to use if needed. There was no deceit detected, and the patient presented in a manner that was believable.       DSM5  Diagnosis:  296.32 (F33.1) Major Depressive Disorder, Recurrent Episode, Moderate With anxious distress  300.02 (F41.1) Generalized Anxiety Disorder  Post-traumatic stress disorder (PTSD)   Rule out Somatic Symptom Disorder    Medical comorbidities include:   Patient Active Problem List    Diagnosis Date Noted     Mobile cecum 11/09/2017     Priority: Medium     Cecum noted in Right lower quadrant on 4/17 CT scan, and in Left upper Quadrant on CT on 11/2017.       Vitamin D deficiency 10/11/2017     Priority: Medium     How low, unknown/not found. On D when tested at 27. Starting cholecalciferol October 2017. Needs recheck.       Convulsions, unspecified convulsion type (H) 10/03/2017     Priority: Medium     Transient alteration of awareness 10/03/2017     Priority: Medium     Chronic pain syndrome 07/27/2017     Priority: Medium     Major depressive disorder, recurrent episode, moderate (H) 06/27/2017     Priority: Medium     Cervical pain 05/02/2017     Priority: Medium     Acute left ankle pain 03/31/2017     Priority: Medium     Cervical dystonia 03/28/2017     Priority: Medium     PTSD (post-traumatic stress disorder) 01/17/2017     Priority: Medium     Left knee pain 10/20/2016     Priority: Medium     Patellofemoral instability 10/20/2016     Priority: Medium     Fibromyalgia 08/04/2016     Priority: Medium     Rheumatoid arthritis of multiple sites without rheumatoid factor (H) 08/04/2016     Priority: Medium     Raynaud's disease without gangrene 08/04/2016     Priority: Medium     Chronic abdominal pain 08/04/2016     Priority: Medium     Palpitations 01/12/2016     Priority: Medium     On colchicine therapy 10/30/2015     Priority: Medium     Spell of shaking 05/06/2015     Priority: Medium     Migraine  02/04/2015     Priority: Medium     Problem list name updated by automated process. Provider to review       Behcet's disease (H) 12/10/2014     Priority: Medium     Headaches due to old head injury 11/12/2013     Priority: Medium     Milk protein intolerance 10/11/2013     Priority: Medium     Concussion 02/13/2013     Priority: Medium     Jan 2013, with prolonged recovery- followed by sports med         Knee pain 01/03/2013     Priority: Medium     TAHIR (generalized anxiety disorder) 06/25/2009     Priority: Medium     Tics - Tourette syndrome 05/18/2009     Priority: Medium     Followed by psychotherapy. Symptoms well managed. Originally diagnosed at [facility] neurology. (Dr. Simpson)           IBS (irritable bowel syndrome) 05/18/2009     Priority: Medium     Seasonal allergic rhinitis 05/18/2009     Priority: Medium       Psychosocial & Contextual Factors:  Medical Comorbidities, Occupational Difficulties, Financial Difficulties    Assessment:  Samara Oropeza reports some irritability and increased anxiety related to ongoing medical issues. Medication side effects and alternatives were reviewed. Health promotion activities recommended and reviewed today. All questions addressed. Education and counseling completed regarding risks and benefits of medications and psychotherapy options.    Recommend a Controlled Substance Agreement for all controlled medications.      Continue to monitor nightmares and may augment with cyproheptadine or other sleeping medication such as Desyrel/Olepto (trazodone) or Remeron (mirtazapine). Cymbalta (duloxetine) may also be an option for anxiety, depression, and chronic pain. Minipress (prazosin) may also be an option for nightmares related to PTSD, but will watch her blood pressures with Intuniv (guanfacine).       Sleep consultation placed to rule out sleep apnea and other parasomnias.    Patient reports today that she will be getting stomach surgery in a few weeks. Patient will  also be hospitalized next week for 5-10 days for video EEG.     Treatment Plan:    Start Minipress (prazosin) 1 mg by mouth daily at bedtime for nightmares. May consider dose increase if needed.     Continue Vistaril/Atarax (hydroxyzine) 25- 50 mg by mouth up to 4 times daily for anxiety.    Continue Intuniv (guanfacine) 3 mg two times per day per primary care provider for Tourette's Syndrome.    Continue Elavil (amitriptyline) 50 mg by mouth daily at bedtime for sleep and pain per pain specialist.    Continue Ativan (lorazepam) per pain specialist.     Continue all other medications as reviewed per electronic medical record today.     Safety plan reviewed. To the Emergency Department as needed or call after hours crisis line at [phone] or [phone].     To schedule individual or family therapy, call [facility] at [phone].     Schedule an appointment with me in 10-12 weeks or sooner as needed. Call [facility] at [phone] to schedule.    Follow up with primary care provider as planned or for acute medical concerns.    Call the psychiatric nurse line with medication questions or concerns at [phone].    [email or web address] may be used to communicate with your provider, but this is not intended to be used for emergencies.    Administrative Billing:   Time spent with patient was 30 minutes and greater than 50% of time or 20 minutes was spent in counseling and coordination of care regarding above diagnoses and treatment plan.    Patient Status:  Patient will continue to be seen for ongoing consultation and stabilization.    Signed:   Cata Hurst, PhD, APRN, CNP   Psychiatry

## 2017-12-12 NOTE — MR AVS SNAPSHOT
After Visit Summary   12/12/2017    Samara Oropeza    MRN: [id number]           Patient Information     Date Of Birth          1995        Visit Information        Provider Department      12/12/2017 2:45 PM Cata Hurst NP [facility]        Today's Diagnoses     TAHIR (generalized anxiety disorder)    -  1    PTSD (post-traumatic stress disorder)        Sleep disturbance          Care Instructions    Treatment Plan:    Start Minipress (prazosin) 1 mg by mouth daily at bedtime for nightmares. May consider dose increase if needed.     Continue Vistaril/Atarax (hydroxyzine) 25- 50 mg by mouth up to 4 times daily for anxiety.    Continue Intuniv (guanfacine) 3 mg two times per day per primary care provider for Tourette's Syndrome.    Continue Elavil (amitriptyline) 50 mg by mouth daily at bedtime for sleep and pain per pain specialist.    Continue Ativan (lorazepam) per pain specialist.     Continue all other medications as reviewed per electronic medical record today.     Safety plan reviewed. To the Emergency Department as needed or call after hours crisis line at [phone] or [phone].     To schedule individual or family therapy, call [facility] at [phone].     Schedule an appointment with me in 10-12 weeks or sooner as needed. Call [facility] at [phone] to schedule.    Follow up with primary care provider as planned or for acute medical concerns.    Call the psychiatric nurse line with medication questions or concerns at [phone].    InfluAdshart may be used to communicate with your provider, but this is not intended to be used for emergencies.            Follow-ups after your visit        Additional Services     SLEEP EVALUATION & MANAGEMENT REFERRAL - ADULT -[facility]  [phone] (Age 18 and up) (Or [place])       Please be aware that coverage of these services is subject to the terms and  limitations of your health insurance plan.  Call member services at your health plan with any benefit or coverage questions.      Please bring the following to your appointment:    >>   List of current medications   >>   This referral request   >>   Any documents/labs given to you for this referral                      Your next 10 appointments already scheduled     Dec 18, 2017 10:00 AM CST   (Arrive by 9:45 AM)   New Patient Visit with Tj Mojica MD   [facility] General Surgery ([facility])    [address] [place] [address]  4th [facility] [place] [id number] [phone]            Dec 19, 2017  9:15 AM CST   Video Hookup Visit with [facility] EEG TECH 4   [facility] ([facility])    [facility]  [address] [facility] [id number] [phone]           Vivian: Your appointment is scheduled at [facility]. [address] [place] [id number]            Jan 16, 2018 11:30 AM CST   [facility] Visit with Austen Marquez MD   [facility]    [address] [id number] [phone]            Jan 17, 2018  2:20 PM CST   (Arrive by 2:05 PM)   New Patient Visit with Estela Cuenca MD   [facility]astroenterology [facility] IBD [facility] ([facility])    [address] [place] [id number] [phone]            Jan 24, 2018  2:00 PM CST   (Arrive by 1:45 PM)   Return Visit with EVI Vizcaino [facility] ([facility])    [address] [place] [phone]            Feb 13, 2018  2:30 PM CST   (Arrive by 2:15 PM)   Return Seizure with Sigifredo Flores MD   [facility] Neurology ([facility])    [address] [place] [id number]    [phone]            Feb 28, 2018  3:00 PM CST   (Arrive by 2:45 PM)   Return Botox with Frederick Bender MD   [facility] Physical Medicine and Rehabilitation ([facility])    [address] [facility]  3rd [facility] [id number] [phone]              Future tests that were ordered for you today     Open Future Orders        Priority Expected Expires Ordered    SLEEP EVALUATION & MANAGEMENT REFERRAL - ADULT -[facility]  [phone] (Age 18 and up) (Or [place]) Routine  12/12/2018 12/12/2017            Who to contact     If you have questions or need follow up information about today's clinic visit or your schedule please contact [facility] directly at [phone].  Normal or non-critical lab and imaging results will be communicated to you by MyChart, letter or phone within 4 business days after the clinic has received the results. If you do not hear from us within 7 days, please contact the clinic through Bdayhart or phone. If you have a critical or abnormal lab result, we will notify you by phone as soon as possible.  Submit refill requests through [employer] or call your pharmacy and they will forward the refill request to us. Please allow 3 business days for your refill to be completed.          Additional Information About Your Visit        [employer] Information     [employer] gives you secure access to your electronic health record. If you see a primary care provider, you can also send messages to your care team and make appointments. If you have questions, please call your primary care clinic.  If you do not have a primary care provider, please call [phone] and they will assist you.        Care EveryWhere ID     This is your Care EveryWhere ID. This could be used by other organizations to access your [place] medical records  [id number]        Your Vitals Were     Pulse Temperature Height Last Period Pulse Oximetry  "Breastfeeding?    94 98.6  F (37  C) (Oral) 5' 3\" (1.6 m) 11/28/2017 (Exact Date) 100% No    BMI (Body Mass Index)                   27.63 kg/m2            Blood Pressure from Last 3 Encounters:   12/12/17 110/64   12/06/17 109/63   11/09/17 114/82    Weight from Last 3 Encounters:   12/12/17 156 lb (70.8 kg)   12/06/17 146 lb (66.2 kg)   11/09/17 146 lb (66.2 kg)                 Today's Medication Changes          These changes are accurate as of: 12/12/17  3:16 PM.  If you have any questions, ask your nurse or doctor.               Start taking these medicines.        Dose/Directions    prazosin 1 MG capsule   Commonly known as:  MINIPRESS   Used for:  PTSD (post-traumatic stress disorder)   Started by:  Cata Hurst NP        Dose:  1 mg   Take 1 capsule (1 mg) by mouth At Bedtime For nightmares.   Quantity:  30 capsule   Refills:  1            Where to get your medicines      These medications were sent to [place] [address] [place] [address]  [address] [facility] [address]     Phone:  [phone]     hydrOXYzine 25 MG tablet    prazosin 1 MG capsule                Primary Care Provider Office Phone # Fax #    Sonja EVI [facility] [phone]       [address]  [facility] [place] [address]        Equal Access to Services     [facility] AH: Hadii malcolm byerso Sotiffany, waaxda luqadaha, qaybta kaalmada adeegyada, waxay brad redmond adedavid parsons. So [facility] [phone].    ATENCIÓN: Si habla español, tiene a livingston disposición servicios gratuitos de asistencia lingüística. Llame al [phone].    We comply with applicable federal civil rights laws and [place] laws. We do not discriminate on the basis of race, color, national origin, age, disability, sex, sexual orientation, or gender identity.            Thank you!     Thank you for choosing [facility]  for your care. Our goal is always to provide you with excellent care. Hearing back " from our patients is one way we can continue to improve our services. Please take a few minutes to complete the written survey that you may receive in the mail after your visit with us. Thank you!             Your Updated Medication List - Protect others around you: Learn how to safely use, store and throw away your medicines at www.disposemymeds.org.          This list is accurate as of: 12/12/17  3:16 PM.  Always use your most recent med list.                   Brand Name Dispense Instructions for use Diagnosis    albuterol 108 (90 BASE) MCG/ACT Inhaler    PROAIR HFA/PROVENTIL HFA/VENTOLIN HFA    1 Inhaler    Inhale 2 puffs into the lungs every 6 hours as needed for shortness of breath / dyspnea or wheezing    Atypical chest pain       amitriptyline 50 MG tablet    ELAVIL    30 tablet    Take 1 tablet (50 mg) by mouth At Bedtime    Abdominal pain, generalized, Insomnia due to medical condition       apremilast 30 MG tablet    OTEZLA    180 tablet    30mg twice daily.    Behcet's disease (H)       ASPIRIN CHILDRENS 81 MG chewable tablet   Generic drug:  aspirin      Take 81 mg by mouth as needed        betamethasone valerate 0.1 % cream    VALISONE     Apply topically 2 times daily        * botulinum toxin type A 100 UNITS injection    BOTOX    200 Units    Inject 200 Units into the muscle every 3 months    Cervical dystonia       * ONABOTULINUMTOXINA IJ      Inject 165 Units as directed once Lot# /C3 Expiration: 04/2020        * BOTOX IJ      Inject 165 Units into the muscle once Lot /C3 Exp 06/2020        carbamide peroxide 6.5 % otic solution    DEBROX     5 drops 2 times daily        clobetasol 0.05 % cream    TEMOVATE    60 g    Apply topically 2 times daily    Folliculitis       Colchicine 0.6 MG Caps     90 capsule    Take 0.6 mg by mouth See Admin Instructions 2 capsules in AM and 1 capsule in PM    Behcet's syndrome (H)       COMPOUNDED NON-CONTROLLED SUBSTANCE - PHARMACY TO MIX COMPOUNDED  MEDICATION    CMPD RX    30 capsule    Take one capsule in the morning    Fibromyalgia       diclofenac 1 % Gel topical gel    VOLTAREN    100 g    Apply 2-4 grams to affected area(s) up to 4 times per day as needed. This is an anti-inflammatory medication.    Polyarthralgia       dicyclomine 20 MG tablet    BENTYL    60 tablet    Take 1 tablet (20 mg) by mouth 4 times daily as needed    Abdominal cramping       diltiazem 2% in PLO cream (FV COMPOUNDED) 2% Gel     30 g    Apply small pea size amount three times daily to anus until pain is gone.    Anal fissure       diphenhydrAMINE 25 MG tablet    BENADRYL    30 tablet    Take 1 tablet (25 mg) by mouth every 8 hours as needed for allergies        EPINEPHrine 0.3 MG/0.3ML injection 2-pack    EPIPEN 2-EAMON    0.6 mL    Inject 0.3 mLs (0.3 mg) into the muscle as needed for anaphylaxis    Hx of bee sting allergy       EXCEDRIN MIGRAINE PO      Take by mouth as needed        guanFACINE 1 MG tablet    TENEX    270 tablet    Take 3 tablets (3 mg) by mouth 2 times daily    Tic       hydrOXYzine 25 MG tablet    ATARAX    90 tablet    Take 1-2 tablets (25-50 mg) by mouth every 6 hours as needed for anxiety    TAHIR (generalized anxiety disorder)       hyoscyamine 0.125 MG tablet    ANASPAZ/LEVSIN    40 tablet    Take 1-2 tablets (125-250 mcg) by mouth every 4 hours as needed for cramping    Abdominal pain, generalized       hypromellose 0.3 % Soln ophthalmic solution    GENTEAL     1 drop every hour as needed        LACTAID PO      Take by mouth daily        lactulose 20 GM/30ML Soln     300 mL    Take 30 mLs by mouth 3 times daily as needed    Constipation, unspecified constipation type       lidocaine 2 % topical gel    XYLOCAINE     Apply 1 Tube topically daily Reported on 4/21/2017        lidocaine visc 2% 2.5mL/5mL & maalox/mylanta w/ simeth 2.5mL/5mL & diphenhydrAMINE 5mg/5mL Susp suspension    [facility]    1 Bottle    Swish and swallow 10 mLs in mouth every  6 hours as needed for mouth sores    Behcet's syndrome (H)       linaclotide 290 MCG capsule    LINZESS    90 capsule    Take 1 capsule (290 mcg) by mouth every morning (before breakfast)    Irritable bowel syndrome       LORazepam 1 MG tablet    ATIVAN    60 tablet    Take 0.5 tablets (0.5 mg) by mouth 2 times daily as needed for other (atypical chest pain) Do not operate a vehicle after taking this medication    Chronic abdominal pain       meclizine 25 MG tablet    ANTIVERT    30 tablet    Take 1 tablet (25 mg) by mouth every 6 hours as needed for dizziness    Nausea       omeprazole 40 MG capsule    priLOSEC    90 capsule    Take 1 capsule (40 mg) by mouth daily    Gastroesophageal reflux disease, esophagitis presence not specified       ondansetron 8 MG ODT tab    ZOFRAN-ODT    60 tablet    Take 1 tablet (8 mg) by mouth every 8 hours as needed for nausea    Non-intractable vomiting with nausea, unspecified vomiting type, Hematemesis, presence of nausea not specified       pramoxine-zinc oxide in mineral oil 1-12.5 % Oint     1 Tube    Place 1 g rectally daily as needed for hemorrhoids or irritation        prazosin 1 MG capsule    MINIPRESS    30 capsule    Take 1 capsule (1 mg) by mouth At Bedtime For nightmares.    PTSD (post-traumatic stress disorder)       promethazine 25 MG tablet    PHENERGAN    20 tablet    Take 0.5-1 tablets (12.5-25 mg) by mouth every 6 hours as needed for nausea    Intractable chronic migraine without aura and without status migrainosus       SPRINTEC 28 0.25-35 MG-MCG per tablet   Generic drug:  norgestimate-ethinyl estradiol     84 tablet    TAKE 1 TABLET BY MOUTH ONCE DAILY.    General counseling for prescription of oral contraceptives       sucralfate 1 GM/10ML suspension    CARAFATE    1200 mL    Take 10 mLs (1 g) by mouth 4 times daily    Bile reflux gastritis, Nausea       triamcinolone 0.1 % cream    KENALOG    15 g    Apply sparingly to oral ulcers three times daily for 14  days as needed.    Behcet's syndrome (H)       * Notice:  This list has 3 medication(s) that are the same as other medications prescribed for you. Read the directions carefully, and ask your doctor or other care provider to review them with you.

## 2017-12-12 NOTE — TELEPHONE ENCOUNTER
Prescription approved per [facility] Refill Protocol.    Last OV 11/9/17    Britni Matamoros RN   [facility]

## 2017-12-12 NOTE — PATIENT INSTRUCTIONS
Treatment Plan:    Start Minipress (prazosin) 1 mg by mouth daily at bedtime for nightmares. May consider dose increase if needed.     Continue Vistaril/Atarax (hydroxyzine) 25- 50 mg by mouth up to 4 times daily for anxiety.    Continue Intuniv (guanfacine) 3 mg two times per day per primary care provider for Tourette's Syndrome.    Continue Elavil (amitriptyline) 50 mg by mouth daily at bedtime for sleep and pain per pain specialist.    Continue Ativan (lorazepam) per pain specialist.     Continue all other medications as reviewed per electronic medical record today.     Safety plan reviewed. To the Emergency Department as needed or call after hours crisis line at [phone] or [phone].     To schedule individual or family therapy, call [facility] at [phone].     Schedule an appointment with me in 10-12 weeks or sooner as needed. Call [facility] at [phone] to schedule.    Follow up with primary care provider as planned or for acute medical concerns.    Call the psychiatric nurse line with medication questions or concerns at [phone].    CourseAdvisorhart may be used to communicate with your provider, but this is not intended to be used for emergencies.

## 2017-12-12 NOTE — NURSING NOTE
"Chief Complaint   Patient presents with     Consult       Initial /64 (BP Location: Left arm, Patient Position: Sitting, Cuff Size: Adult Regular)  Pulse 94  Temp 98.6  F (37  C) (Oral)  Ht 5' 3\" (1.6 m)  Wt 156 lb (70.8 kg)  LMP 11/28/2017 (Exact Date)  SpO2 100%  Breastfeeding? No  BMI 27.63 kg/m2 Estimated body mass index is 27.63 kg/(m^2) as calculated from the following:    Height as of this encounter: 5' 3\" (1.6 m).    Weight as of this encounter: 156 lb (70.8 kg).  Medication Reconciliation: complete   David FERMIN      "

## 2017-12-13 ASSESSMENT — PATIENT HEALTH QUESTIONNAIRE - PHQ9: SUM OF ALL RESPONSES TO PHQ QUESTIONS 1-9: 12

## 2017-12-13 ASSESSMENT — ANXIETY QUESTIONNAIRES: GAD7 TOTAL SCORE: 10

## 2017-12-15 ENCOUNTER — TELEPHONE (OUTPATIENT)
Dept: SURGERY | Facility: CLINIC | Age: 22
End: 2017-12-15

## 2017-12-15 NOTE — TELEPHONE ENCOUNTER
Pre Visit Call and Assessment    Date of call:  12/15/2017    Phone numbers:  Home number on file [phone] (home)    Reached patient/confirmed appointment:  Yes  Patient care team/Primary provider:  Sonja Abreu    Referred to:  Dr. Tj Mojica    Reason for visit: New consult-floppy cecum

## 2017-12-18 ENCOUNTER — OFFICE VISIT (OUTPATIENT)
Dept: SURGERY | Facility: CLINIC | Age: 22
End: 2017-12-18
Payer: COMMERCIAL

## 2017-12-18 VITALS
HEIGHT: 63 IN | BODY MASS INDEX: 27.05 KG/M2 | OXYGEN SATURATION: 100 % | TEMPERATURE: 98.4 F | DIASTOLIC BLOOD PRESSURE: 76 MMHG | SYSTOLIC BLOOD PRESSURE: 131 MMHG | HEART RATE: 115 BPM | WEIGHT: 152.7 LBS

## 2017-12-18 DIAGNOSIS — K62.5 RECTAL BLEEDING: ICD-10-CM

## 2017-12-18 DIAGNOSIS — Q43.3 MOBILE CECUM (H): Primary | ICD-10-CM

## 2017-12-18 ASSESSMENT — ENCOUNTER SYMPTOMS
NAUSEA: 1
VOMITING: 1
EYE REDNESS: 1
ABDOMINAL PAIN: 1
POSTURAL DYSPNEA: 1
JOINT SWELLING: 1
MUSCLE CRAMPS: 1
HALLUCINATIONS: 1
NAIL CHANGES: 1
INSOMNIA: 1
COUGH DISTURBING SLEEP: 1
DYSPNEA ON EXERTION: 1
ORTHOPNEA: 1
MEMORY LOSS: 1
JAUNDICE: 0
SINUS PAIN: 1
POOR WOUND HEALING: 1
TREMORS: 1
EYE WATERING: 1
EYE PAIN: 1
LEG PAIN: 1
HEMOPTYSIS: 0
HEADACHES: 1
SEIZURES: 1
WEIGHT LOSS: 1
LIGHT-HEADEDNESS: 1
SHORTNESS OF BREATH: 1
DECREASED APPETITE: 1
POLYPHAGIA: 0
WEIGHT GAIN: 1
SLEEP DISTURBANCES DUE TO BREATHING: 1
EXERCISE INTOLERANCE: 0
DECREASED CONCENTRATION: 1
MYALGIAS: 1
HEARTBURN: 1
ALTERED TEMPERATURE REGULATION: 1
TASTE DISTURBANCE: 1
DOUBLE VISION: 1
STIFFNESS: 1
SKIN CHANGES: 0
DIZZINESS: 1
SYNCOPE: 1
TROUBLE SWALLOWING: 1
INCREASED ENERGY: 1
TINGLING: 1
CONSTIPATION: 1
HOARSE VOICE: 1
NECK MASS: 0
SINUS CONGESTION: 1
BOWEL INCONTINENCE: 0
DEPRESSION: 0
BLOOD IN STOOL: 1
NUMBNESS: 1
SPUTUM PRODUCTION: 1
POLYDIPSIA: 1
BLOATING: 1
SORE THROAT: 1
LOSS OF CONSCIOUSNESS: 1
FATIGUE: 1
FEVER: 1
MUSCLE WEAKNESS: 1
WHEEZING: 0
CHILLS: 1
BACK PAIN: 1
DIARRHEA: 1
SMELL DISTURBANCE: 1
PANIC: 1
NECK PAIN: 1
PALPITATIONS: 1
WEAKNESS: 1
EYE IRRITATION: 1
SNORES LOUDLY: 1
COUGH: 0
SPEECH CHANGE: 1
HYPERTENSION: 0
NIGHT SWEATS: 1
DISTURBANCES IN COORDINATION: 1
ARTHRALGIAS: 1
PARALYSIS: 1
HYPOTENSION: 1
NERVOUS/ANXIOUS: 1
RECTAL PAIN: 0

## 2017-12-18 ASSESSMENT — PAIN SCALES - GENERAL: PAINLEVEL: SEVERE PAIN (6)

## 2017-12-18 NOTE — MR AVS SNAPSHOT
After Visit Summary   12/18/2017    Samara Oropeza    MRN: [id number]           Patient Information     Date Of Birth          1995        Visit Information        Provider Department      12/18/2017 10:00 AM Tj Mojica MD [facility] General Surgery        Care Instructions    Dr. Mojica would like you to undergo a small bowel follow through, upper endoscopy, and colonoscopy.  After these tests are done you will need to see Dr. Coburn in the [place].      Please call with questions [phone], #3 ([place])          Follow-ups after your visit        Your next 10 appointments already scheduled     Dec 19, 2017  9:15 AM CST   Video Hookup Visit with [facility] EEG TECH 4   [facility] EEG ([facility])    [facility]  [address] [facility] [id number] [phone]           [place]: Your appointment is scheduled at [facility]. [address] [place] [address]            Jan 16, 2018 11:30 AM CST   Return Visit with Austen Marquez MD   [facility] ([facility])    [address] [id number] [phone]            Jan 17, 2018  2:20 PM CST   (Arrive by 2:05 PM)   New Patient Visit with Estela Cuenca MD   [facility] Gastroenterology and IBD Clinic ([facility])    [address] [place] [id number] [phone]            Jan 24, 2018  2:00 PM CST   (Arrive by 1:45 PM)   Return Visit with EVI Vizcaino CNP   [facility] G[facility]ology [facility] ([facility] [place])    [address] [place] [address]  4th [facility] [id number] [phone]            Feb 13, 2018  2:30 PM CST   (Arrive by 2:15 PM)   Return Seizure with Sigifredo Flores MD   [facility] Neurology ([facility] [place])    [address] [place] [address]  Floor  [facility] [id number] [phone]            Feb 27, 2018 10:15 AM CST   Return Visit with Cata Hurst NP   [facility] ([facility])    [address]  [address]  [facility] [id number] [phone]            Feb 28, 2018  3:00 PM CST   (Arrive by 2:45 PM)   Return Botox with Frederick Bender MD   [facility] Physical Medicine and Rehabilitation ([facility])    [address] [facility]  3rd Floor  [facility] [id number] [phone]              Who to contact     Please call your clinic at [phone] to:    Ask questions about your health    Make or cancel appointments    Discuss your medicines    Learn about your test results    Speak to your doctor   If you have compliments or concerns about an experience at your clinic, or if you wish to file a complaint, please contact [facility] Patient Relations at [phone] or email us at [email or web address]         Additional Information About Your Visit        [email or web address] Information     [employer] gives you secure access to your electronic health record. If you see a primary care provider, you can also send messages to your care team and make appointments. If you have questions, please call your primary care clinic.  If you do not have a primary care provider, please call [phone] and they will assist you.      [employer] is an electronic gateway that provides easy, online access to your medical records. With [employer], you can request a clinic appointment, read your test results, renew a prescription or communicate with your care team.     To access your existing account, please contact your [facility] or call [phone] for assistance.        Care EveryWhere ID     This is your Care EveryWhere ID. This could be used by other organizations to access your [place] medical records  [id number]        Your  "Vitals Were     Pulse Temperature Height Last Period Pulse Oximetry BMI (Body Mass Index)    115 98.4  F (36.9  C) (Oral) 1.6 m (5' 3\") 11/28/2017 (Exact Date) 100% 27.05 kg/m2       Blood Pressure from Last 3 Encounters:   12/18/17 131/76   12/12/17 110/64   12/06/17 109/63    Weight from Last 3 Encounters:   12/18/17 69.3 kg (152 lb 11.2 oz)   12/12/17 70.8 kg (156 lb)   12/06/17 66.2 kg (146 lb)              Today, you had the following     No orders found for display       Primary Care Provider Office Phone # Fax #    SonjaEVI Huynh [facility] [phone]       [address] [facility] [address]  [facility] [address]        Equal Access to Services     [facility]: Hadii aad ku hadasho Sotiffany, waaxda luqadaha, qaybta kaalmada adedavidyada, mike rodriguez . So [facility] [phone].    ATENCIÓN: Si millila español, tiene a livingston disposición servicios gratuitos de asistencia lingüística. Llame al [phone].    We comply with applicable federal civil rights laws and [place] laws. We do not discriminate on the basis of race, color, national origin, age, disability, sex, sexual orientation, or gender identity.            Thank you!     Thank you for choosing [facility] SURGERY  for your care. Our goal is always to provide you with excellent care. Hearing back from our patients is one way we can continue to improve our services. Please take a few minutes to complete the written survey that you may receive in the mail after your visit with us. Thank you!             Your Updated Medication List - Protect others around you: Learn how to safely use, store and throw away your medicines at www.disposemymeds.org.          This list is accurate as of: 12/18/17 11:08 AM.  Always use your most recent med list.                   Brand Name Dispense Instructions for use Diagnosis    albuterol 108 (90 BASE) MCG/ACT Inhaler    PROAIR HFA/PROVENTIL HFA/VENTOLIN HFA    1 Inhaler    Inhale 2 puffs " into the lungs every 6 hours as needed for shortness of breath / dyspnea or wheezing    Atypical chest pain       amitriptyline 50 MG tablet    ELAVIL    30 tablet    Take 1 tablet (50 mg) by mouth At Bedtime    Abdominal pain, generalized, Insomnia due to medical condition       apremilast 30 MG tablet    OTEZLA    180 tablet    30mg twice daily.    Behcet's disease (H)       ASPIRIN CHILDRENS 81 MG chewable tablet   Generic drug:  aspirin      Take 81 mg by mouth as needed        betamethasone valerate 0.1 % cream    VALISONE     Apply topically 2 times daily        * botulinum toxin type A 100 UNITS injection    BOTOX    200 Units    Inject 200 Units into the muscle every 3 months    Cervical dystonia       * ONABOTULINUMTOXINA IJ      Inject 165 Units as directed once Lot# /C3 Expiration: 04/2020        * BOTOX IJ      Inject 165 Units into the muscle once Lot /C3 Exp 06/2020        carbamide peroxide 6.5 % otic solution    DEBROX     5 drops 2 times daily        clobetasol 0.05 % cream    TEMOVATE    60 g    Apply topically 2 times daily    Folliculitis       Colchicine 0.6 MG Caps     90 capsule    Take 0.6 mg by mouth See Admin Instructions 2 capsules in AM and 1 capsule in PM    Behcet's syndrome (H)       COMPOUNDED NON-CONTROLLED SUBSTANCE - PHARMACY TO MIX COMPOUNDED MEDICATION    CMPD RX    30 capsule    Take one capsule in the morning    Fibromyalgia       diclofenac 1 % Gel topical gel    VOLTAREN    100 g    Apply 2-4 grams to affected area(s) up to 4 times per day as needed. This is an anti-inflammatory medication.    Polyarthralgia       dicyclomine 20 MG tablet    BENTYL    60 tablet    Take 1 tablet (20 mg) by mouth 4 times daily as needed    Abdominal cramping       diltiazem 2% in PLO cream (FV COMPOUNDED) 2% Gel     30 g    Apply small pea size amount three times daily to anus until pain is gone.    Anal fissure       diphenhydrAMINE 25 MG tablet    BENADRYL    30 tablet    Take 1  tablet (25 mg) by mouth every 8 hours as needed for allergies        EPINEPHrine 0.3 MG/0.3ML injection 2-pack    EPIPEN 2-EAMON    0.6 mL    Inject 0.3 mLs (0.3 mg) into the muscle as needed for anaphylaxis    Hx of bee sting allergy       EXCEDRIN MIGRAINE PO      Take by mouth as needed        guanFACINE 1 MG tablet    TENEX    270 tablet    Take 3 tablets (3 mg) by mouth 2 times daily    Tic       hydrOXYzine 25 MG tablet    ATARAX    90 tablet    Take 1-2 tablets (25-50 mg) by mouth every 6 hours as needed for anxiety    TAHIR (generalized anxiety disorder)       hyoscyamine 0.125 MG tablet    ANASPAZ/LEVSIN    40 tablet    Take 1-2 tablets (125-250 mcg) by mouth every 4 hours as needed for cramping    Abdominal pain, generalized       hypromellose 0.3 % Soln ophthalmic solution    GENTEAL     1 drop every hour as needed        LACTAID PO      Take by mouth daily        lactulose 20 GM/30ML Soln     300 mL    Take 30 mLs by mouth 3 times daily as needed    Constipation, unspecified constipation type       lidocaine 2 % topical gel    XYLOCAINE     Apply 1 Tube topically daily Reported on 4/21/2017        lidocaine visc 2% 2.5mL/5mL & maalox/mylanta w/ simeth 2.5mL/5mL & diphenhydrAMINE 5mg/5mL Susp suspension    [facility]    1 Bottle    Swish and swallow 10 mLs in mouth every 6 hours as needed for mouth sores    Behcet's syndrome (H)       linaclotide 290 MCG capsule    LINZESS    90 capsule    Take 1 capsule (290 mcg) by mouth every morning (before breakfast)    Irritable bowel syndrome       LORazepam 1 MG tablet    ATIVAN    60 tablet    Take 0.5 tablets (0.5 mg) by mouth 2 times daily as needed for other (atypical chest pain) Do not operate a vehicle after taking this medication    Chronic abdominal pain       meclizine 25 MG tablet    ANTIVERT    30 tablet    Take 1 tablet (25 mg) by mouth every 6 hours as needed for dizziness    Nausea       omeprazole 40 MG capsule    priLOSEC    90 capsule     Take 1 capsule (40 mg) by mouth daily    Gastroesophageal reflux disease, esophagitis presence not specified       ondansetron 8 MG ODT tab    ZOFRAN-ODT    60 tablet    Take 1 tablet (8 mg) by mouth every 8 hours as needed for nausea    Non-intractable vomiting with nausea, unspecified vomiting type, Hematemesis, presence of nausea not specified       pramoxine-zinc oxide in mineral oil 1-12.5 % Oint     1 Tube    Place 1 g rectally daily as needed for hemorrhoids or irritation        prazosin 1 MG capsule    MINIPRESS    30 capsule    Take 1 capsule (1 mg) by mouth At Bedtime For nightmares.    PTSD (post-traumatic stress disorder)       promethazine 25 MG tablet    PHENERGAN    20 tablet    Take 0.5-1 tablets (12.5-25 mg) by mouth every 6 hours as needed for nausea    Intractable chronic migraine without aura and without status migrainosus       SPRINTEC 28 0.25-35 MG-MCG per tablet   Generic drug:  norgestimate-ethinyl estradiol     84 tablet    TAKE 1 TABLET BY MOUTH ONCE DAILY.    General counseling for prescription of oral contraceptives       sucralfate 1 GM/10ML suspension    CARAFATE    1200 mL    Take 10 mLs (1 g) by mouth 4 times daily    Bile reflux gastritis, Nausea       triamcinolone 0.1 % cream    KENALOG    15 g    Apply sparingly to oral ulcers three times daily for 14 days as needed.    Behcet's syndrome (H)       * Notice:  This list has 3 medication(s) that are the same as other medications prescribed for you. Read the directions carefully, and ask your doctor or other care provider to review them with you.

## 2017-12-18 NOTE — LETTER
12/18/2017       RE: Samara Oropeza  [address]   [place] [address]     Dear Colleague,    Thank you for referring your patient, Samara Oropeza, to the [facility] GENERAL SURGERY at [facility]. Please see a copy of my visit note below.    REASON FOR VISIT:  Samara Oropeza is a 22-year-old female with a complex medical and surgical history.  The patient presents with severe abdominal pain associated with blood per rectum.  The patient has findings on a CT scan suggesting a possible partial malrotation or a hypermobile cecum.  The patient currently has no problems.  She is pain-free at this point and presents with her mother and significant other to the Surgery Clinic.       PHYSICAL EXAMINATION:  Samara Oropeza moves easily to the examination table.  Her abdomen is soft and nontender.  There are no masses, tenderness or other abnormalities.  There are no hernias.  The remainder of her examination is unremarkable.  Extremities are warm and well perfused.       IMPRESSION/PLAN:  Samara Oropeza presents with possible malrotation or hypermobile cecum.  I discussed this patient with Dr. Vitor Coburn would be very interested to pursue additional testing and perhaps perform laparoscopic lap band procedure should this be indicated.  The patient understands the need for additional expertise in this area and she agrees to pursue the additional testing.  This will occur in the very near future.  The patient will call or return should she get into difficulties or problems in the interim.         Answers for HPI/ROS submitted by the patient on 12/18/2017   General Symptoms: Yes  Skin Symptoms: Yes  HENT Symptoms: Yes  EYE SYMPTOMS: Yes  HEART SYMPTOMS: Yes  LUNG SYMPTOMS: Yes  INTESTINAL SYMPTOMS: Yes  URINARY SYMPTOMS: No  GYNECOLOGIC SYMPTOMS: No  BREAST SYMPTOMS: No  SKELETAL SYMPTOMS: Yes  BLOOD SYMPTOMS: No  NERVOUS SYSTEM SYMPTOMS: Yes  MENTAL HEALTH  SYMPTOMS: Yes  Fever: Yes  Loss of appetite: Yes  Weight loss: Yes  Weight gain: Yes  Fatigue: Yes  Night sweats: Yes  Chills: Yes  Increased stress: Yes  Excessive hunger: No  Excessive thirst: Yes  Feeling hot or cold when others believe the temperature is normal: Yes  Loss of height: Yes  Post-operative complications: No  Surgical site pain: Yes  Hallucinations: Yes  Change in or Loss of Energy: Yes  Hyperactivity: No  Confusion: Yes  Changes in hair: Yes  Changes in moles/birth marks: No  Itching: No  Rashes: Yes  Changes in nails: Yes  Acne: No  Hair in places you don't want it: No  Change in facial hair: No  Warts: No  Non-healing sores: Yes  Scarring: Yes  Flaking of skin: No  Color changes of hands/feet in cold : Yes  Sun sensitivity: Yes  Skin thickening: No  Ear pain: Yes  Ear discharge: No  Hearing loss: No  Tinnitus: Yes  Nosebleeds: Yes  Congestion: Yes  Sinus pain: Yes  Trouble swallowing: Yes   Voice hoarseness: Yes  Mouth sores: Yes  Sore throat: Yes  Tooth pain: Yes  Gum tenderness: Yes  Bleeding gums: Yes  Change in taste: Yes  Change in sense of smell: Yes  Dry mouth: Yes  Hearing aid used: No  Neck lump: No  Eye pain: Yes  Vision loss: No  Dry eyes: Yes  Watery eyes: Yes  Eye bulging: No  Double vision: Yes  Flashing of lights: No  Spots: Yes  Floaters: Yes  Redness: Yes  Crossed eyes: No  Tunnel Vision: No  Yellowing of eyes: No  Eye irritation: Yes  Cough: No  Sputum or phlegm: Yes  Coughing up blood: No  Difficulty breating or shortness of breath: Yes  Snoring: Yes  Wheezing: No  Difficulty breathing on exertion: Yes  Nighttime Cough: Yes  Difficulty breathing when lying flat: Yes  Chest pain or pressure: Yes  Fast or irregular heartbeat: Yes  Pain in legs with walking: Yes  Trouble breathing while lying down: Yes  Fingers or toes appear blue: Yes  High blood pressure: No  Low blood pressure: Yes  Fainting: Yes  Murmurs: No  Pacemaker: No  Varicose veins: No  Edema or swelling: Yes  Wake up at  night with shortness of breath: Yes  Light-headedness: Yes  Exercise intolerance: No  Heart burn or indigestion: Yes  Nausea: Yes  Vomiting: Yes  Abdominal pain: Yes  Bloating: Yes  Constipation: Yes  Diarrhea: Yes  Blood in stool: Yes  Black stools: No  Rectal or Anal pain: No  Fecal incontinence: No  Yellowing of skin or eyes: No  Vomit with blood: No  Change in stools: Yes  Back pain: Yes  Muscle aches: Yes  Neck pain: Yes  Swollen joints: Yes  Joint pain: Yes  Bone pain: Yes  Muscle cramps: Yes  Muscle weakness: Yes  Joint stiffness: Yes  Bone fracture: No  Trouble with coordination: Yes  Dizziness or trouble with balance: Yes  Fainting or black-out spells: Yes  Memory loss: Yes  Headache: Yes  Seizures: Yes  Speech problems: Yes  Tingling: Yes  Tremor: Yes  Weakness: Yes  Difficulty walking: Yes  Paralysis: Yes  Numbness: Yes  Nervous or Anxious: Yes  Depression: No  Trouble sleeping: Yes  Trouble thinking or concentrating: Yes  Mood changes: Yes  Panic attacks: Yes      Again, thank you for allowing me to participate in the care of your patient.      Sincerely,    Tj Mojica MD

## 2017-12-18 NOTE — PATIENT INSTRUCTIONS
Dr. Mojica would like you to undergo a small bowel follow through, upper endoscopy, and colonoscopy.  After these tests are done you will need to see Dr. Coburn in the [place].      Please call with questions [phone], #[phone] ([place])

## 2017-12-18 NOTE — PROGRESS NOTES
REASON FOR VISIT:  Samara Oropeza is a 22-year-old female with a complex medical and surgical history.  The patient presents with severe abdominal pain associated with blood per rectum.  The patient has findings on a CT scan suggesting a possible partial malrotation or a hypermobile cecum.  The patient currently has no problems.  She is pain-free at this point and presents with her mother and significant other to the Surgery Clinic.       PHYSICAL EXAMINATION:  Samara Oropeza moves easily to the examination table.  Her abdomen is soft and nontender.  There are no masses, tenderness or other abnormalities.  There are no hernias.  The remainder of her examination is unremarkable.  Extremities are warm and well perfused.       IMPRESSION/PLAN:  Samara Oropeza presents with possible malrotation or hypermobile cecum.  I discussed this patient with Dr. Vitor Coburn would be very interested to pursue additional testing and perhaps perform laparoscopic lap band procedure should this be indicated.  The patient understands the need for additional expertise in this area and she agrees to pursue the additional testing.  This will occur in the very near future.  The patient will call or return should she get into difficulties or problems in the interim.         Answers for HPI/ROS submitted by the patient on 12/18/2017   General Symptoms: Yes  Skin Symptoms: Yes  HENT Symptoms: Yes  EYE SYMPTOMS: Yes  HEART SYMPTOMS: Yes  LUNG SYMPTOMS: Yes  INTESTINAL SYMPTOMS: Yes  URINARY SYMPTOMS: No  GYNECOLOGIC SYMPTOMS: No  BREAST SYMPTOMS: No  SKELETAL SYMPTOMS: Yes  BLOOD SYMPTOMS: No  NERVOUS SYSTEM SYMPTOMS: Yes  MENTAL HEALTH SYMPTOMS: Yes  Fever: Yes  Loss of appetite: Yes  Weight loss: Yes  Weight gain: Yes  Fatigue: Yes  Night sweats: Yes  Chills: Yes  Increased stress: Yes  Excessive hunger: No  Excessive thirst: Yes  Feeling hot or cold when others believe the temperature is normal: Yes  Loss of height:  Yes  Post-operative complications: No  Surgical site pain: Yes  Hallucinations: Yes  Change in or Loss of Energy: Yes  Hyperactivity: No  Confusion: Yes  Changes in hair: Yes  Changes in moles/birth marks: No  Itching: No  Rashes: Yes  Changes in nails: Yes  Acne: No  Hair in places you don't want it: No  Change in facial hair: No  Warts: No  Non-healing sores: Yes  Scarring: Yes  Flaking of skin: No  Color changes of hands/feet in cold : Yes  Sun sensitivity: Yes  Skin thickening: No  Ear pain: Yes  Ear discharge: No  Hearing loss: No  Tinnitus: Yes  Nosebleeds: Yes  Congestion: Yes  Sinus pain: Yes  Trouble swallowing: Yes   Voice hoarseness: Yes  Mouth sores: Yes  Sore throat: Yes  Tooth pain: Yes  Gum tenderness: Yes  Bleeding gums: Yes  Change in taste: Yes  Change in sense of smell: Yes  Dry mouth: Yes  Hearing aid used: No  Neck lump: No  Eye pain: Yes  Vision loss: No  Dry eyes: Yes  Watery eyes: Yes  Eye bulging: No  Double vision: Yes  Flashing of lights: No  Spots: Yes  Floaters: Yes  Redness: Yes  Crossed eyes: No  Tunnel Vision: No  Yellowing of eyes: No  Eye irritation: Yes  Cough: No  Sputum or phlegm: Yes  Coughing up blood: No  Difficulty breating or shortness of breath: Yes  Snoring: Yes  Wheezing: No  Difficulty breathing on exertion: Yes  Nighttime Cough: Yes  Difficulty breathing when lying flat: Yes  Chest pain or pressure: Yes  Fast or irregular heartbeat: Yes  Pain in legs with walking: Yes  Trouble breathing while lying down: Yes  Fingers or toes appear blue: Yes  High blood pressure: No  Low blood pressure: Yes  Fainting: Yes  Murmurs: No  Pacemaker: No  Varicose veins: No  Edema or swelling: Yes  Wake up at night with shortness of breath: Yes  Light-headedness: Yes  Exercise intolerance: No  Heart burn or indigestion: Yes  Nausea: Yes  Vomiting: Yes  Abdominal pain: Yes  Bloating: Yes  Constipation: Yes  Diarrhea: Yes  Blood in stool: Yes  Black stools: No  Rectal or Anal pain: No  Fecal  incontinence: No  Yellowing of skin or eyes: No  Vomit with blood: No  Change in stools: Yes  Back pain: Yes  Muscle aches: Yes  Neck pain: Yes  Swollen joints: Yes  Joint pain: Yes  Bone pain: Yes  Muscle cramps: Yes  Muscle weakness: Yes  Joint stiffness: Yes  Bone fracture: No  Trouble with coordination: Yes  Dizziness or trouble with balance: Yes  Fainting or black-out spells: Yes  Memory loss: Yes  Headache: Yes  Seizures: Yes  Speech problems: Yes  Tingling: Yes  Tremor: Yes  Weakness: Yes  Difficulty walking: Yes  Paralysis: Yes  Numbness: Yes  Nervous or Anxious: Yes  Depression: No  Trouble sleeping: Yes  Trouble thinking or concentrating: Yes  Mood changes: Yes  Panic attacks: Yes

## 2017-12-18 NOTE — NURSING NOTE
"Chief Complaint   Patient presents with     Clinic Care Coordination - Initial     new       Vitals:    12/18/17 0955   BP: 131/76   Pulse: 115   Temp: 98.4  F (36.9  C)   TempSrc: Oral   SpO2: 100%   Weight: 152 lb 11.2 oz   Height: 5' 3\"       Body mass index is 27.05 kg/(m^2).      Dunia GALVEZ LPN                          "

## 2017-12-19 ENCOUNTER — HOSPITAL ENCOUNTER (INPATIENT)
Facility: CLINIC | Age: 22
LOS: 3 days | Discharge: HOME | DRG: 101 | End: 2017-12-22
Attending: PSYCHIATRY & NEUROLOGY | Admitting: PSYCHIATRY & NEUROLOGY
Payer: COMMERCIAL

## 2017-12-19 ENCOUNTER — TELEPHONE (OUTPATIENT)
Dept: RHEUMATOLOGY | Facility: CLINIC | Age: 22
End: 2017-12-19

## 2017-12-19 ENCOUNTER — ALLIED HEALTH/NURSE VISIT (OUTPATIENT)
Dept: NEUROLOGY | Facility: CLINIC | Age: 22
DRG: 101 | End: 2017-12-19
Attending: PSYCHIATRY & NEUROLOGY
Payer: COMMERCIAL

## 2017-12-19 DIAGNOSIS — R40.4 TRANSIENT ALTERATION OF AWARENESS: ICD-10-CM

## 2017-12-19 DIAGNOSIS — K12.2 ORAL INFECTION: Primary | ICD-10-CM

## 2017-12-19 DIAGNOSIS — R56.9 CONVULSIONS, UNSPECIFIED CONVULSION TYPE (H): ICD-10-CM

## 2017-12-19 PROBLEM — R68.89 SPELLS OF DECREASED ATTENTIVENESS: Status: ACTIVE | Noted: 2017-12-19

## 2017-12-19 LAB — B-HCG SERPL-ACNC: <1 IU/L (ref 0–5)

## 2017-12-19 PROCEDURE — 40000556 ZZH STATISTIC PERIPHERAL IV START W US GUIDANCE

## 2017-12-19 PROCEDURE — 25000132 ZZH RX MED GY IP 250 OP 250 PS 637: Performed by: PHYSICIAN ASSISTANT

## 2017-12-19 PROCEDURE — 84702 CHORIONIC GONADOTROPIN TEST: CPT | Performed by: PHYSICIAN ASSISTANT

## 2017-12-19 PROCEDURE — 95951 ZZHC EEG VIDEO EACH 24 HR: CPT | Mod: ZF

## 2017-12-19 PROCEDURE — 12000008 ZZH R&B INTERMEDIATE UMMC

## 2017-12-19 PROCEDURE — 25000125 ZZHC RX 250: Performed by: PHYSICIAN ASSISTANT

## 2017-12-19 PROCEDURE — 36415 COLL VENOUS BLD VENIPUNCTURE: CPT | Performed by: PHYSICIAN ASSISTANT

## 2017-12-19 RX ORDER — DEXTRAN 70 AND HYPROMELLOSE 2910 1; 3 MG/ML; MG/ML
1 SOLUTION/ DROPS OPHTHALMIC
Status: DISCONTINUED | OUTPATIENT
Start: 2017-12-19 | End: 2017-12-22 | Stop reason: HOSPADM

## 2017-12-19 RX ORDER — CHOLECALCIFEROL (VITAMIN D3) 125 MCG
3000 CAPSULE ORAL 3 TIMES DAILY PRN
Status: DISCONTINUED | OUTPATIENT
Start: 2017-12-19 | End: 2017-12-19

## 2017-12-19 RX ORDER — DIPHENHYDRAMINE HYDROCHLORIDE AND LIDOCAINE HYDROCHLORIDE AND ALUMINUM HYDROXIDE AND MAGNESIUM HYDRO
10 KIT EVERY 6 HOURS PRN
Status: DISCONTINUED | OUTPATIENT
Start: 2017-12-19 | End: 2017-12-22 | Stop reason: HOSPADM

## 2017-12-19 RX ORDER — ONDANSETRON 4 MG/1
8 TABLET, ORALLY DISINTEGRATING ORAL EVERY 8 HOURS PRN
Status: DISCONTINUED | OUTPATIENT
Start: 2017-12-19 | End: 2017-12-22 | Stop reason: HOSPADM

## 2017-12-19 RX ORDER — LORAZEPAM 2 MG/ML
2 INJECTION INTRAMUSCULAR
Status: DISCONTINUED | OUTPATIENT
Start: 2017-12-19 | End: 2017-12-22 | Stop reason: HOSPADM

## 2017-12-19 RX ORDER — AMITRIPTYLINE HYDROCHLORIDE 50 MG/1
50 TABLET ORAL AT BEDTIME
Status: DISCONTINUED | OUTPATIENT
Start: 2017-12-19 | End: 2017-12-22 | Stop reason: HOSPADM

## 2017-12-19 RX ORDER — EPINEPHRINE 0.3 MG/.3ML
0.3 INJECTION SUBCUTANEOUS
Status: DISCONTINUED | OUTPATIENT
Start: 2017-12-19 | End: 2017-12-20

## 2017-12-19 RX ORDER — ALBUTEROL SULFATE 90 UG/1
2 AEROSOL, METERED RESPIRATORY (INHALATION) EVERY 6 HOURS PRN
Status: DISCONTINUED | OUTPATIENT
Start: 2017-12-19 | End: 2017-12-22 | Stop reason: HOSPADM

## 2017-12-19 RX ORDER — PROCHLORPERAZINE MALEATE 5 MG
10 TABLET ORAL EVERY 8 HOURS PRN
Status: DISCONTINUED | OUTPATIENT
Start: 2017-12-19 | End: 2017-12-22 | Stop reason: HOSPADM

## 2017-12-19 RX ORDER — NORGESTIMATE AND ETHINYL ESTRADIOL 0.25-0.035
1 KIT ORAL AT BEDTIME
Status: DISCONTINUED | OUTPATIENT
Start: 2017-12-19 | End: 2017-12-22 | Stop reason: HOSPADM

## 2017-12-19 RX ORDER — DICYCLOMINE HCL 20 MG
20 TABLET ORAL 4 TIMES DAILY PRN
Status: DISCONTINUED | OUTPATIENT
Start: 2017-12-19 | End: 2017-12-22 | Stop reason: HOSPADM

## 2017-12-19 RX ORDER — SUCRALFATE ORAL 1 G/10ML
1 SUSPENSION ORAL 4 TIMES DAILY
Status: DISCONTINUED | OUTPATIENT
Start: 2017-12-19 | End: 2017-12-22 | Stop reason: HOSPADM

## 2017-12-19 RX ORDER — DIPHENHYDRAMINE HCL 25 MG
25 CAPSULE ORAL EVERY 8 HOURS PRN
Status: DISCONTINUED | OUTPATIENT
Start: 2017-12-19 | End: 2017-12-22 | Stop reason: HOSPADM

## 2017-12-19 RX ORDER — COLCHICINE 0.6 MG/1
1.2 TABLET ORAL EVERY MORNING
Status: DISCONTINUED | OUTPATIENT
Start: 2017-12-20 | End: 2017-12-22 | Stop reason: HOSPADM

## 2017-12-19 RX ORDER — LIDOCAINE 40 MG/G
CREAM TOPICAL
Status: DISCONTINUED | OUTPATIENT
Start: 2017-12-19 | End: 2017-12-22 | Stop reason: HOSPADM

## 2017-12-19 RX ORDER — GUANFACINE 1 MG/1
3 TABLET ORAL 2 TIMES DAILY
Status: DISCONTINUED | OUTPATIENT
Start: 2017-12-19 | End: 2017-12-22 | Stop reason: HOSPADM

## 2017-12-19 RX ORDER — LACTULOSE 10 G/15ML
20 SOLUTION ORAL 3 TIMES DAILY PRN
Status: DISCONTINUED | OUTPATIENT
Start: 2017-12-19 | End: 2017-12-22 | Stop reason: HOSPADM

## 2017-12-19 RX ORDER — COLCHICINE 0.6 MG/1
0.6 TABLET ORAL AT BEDTIME
Status: DISCONTINUED | OUTPATIENT
Start: 2017-12-19 | End: 2017-12-22 | Stop reason: HOSPADM

## 2017-12-19 RX ORDER — GINSENG 100 MG
CAPSULE ORAL 3 TIMES DAILY PRN
Status: DISCONTINUED | OUTPATIENT
Start: 2017-12-19 | End: 2017-12-22 | Stop reason: HOSPADM

## 2017-12-19 RX ORDER — HYOSCYAMINE SULFATE 0.125 MG
125-250 TABLET ORAL EVERY 4 HOURS PRN
Status: DISCONTINUED | OUTPATIENT
Start: 2017-12-19 | End: 2017-12-22 | Stop reason: HOSPADM

## 2017-12-19 RX ORDER — HYDROXYZINE HYDROCHLORIDE 25 MG/1
25-50 TABLET, FILM COATED ORAL EVERY 6 HOURS PRN
Status: DISCONTINUED | OUTPATIENT
Start: 2017-12-19 | End: 2017-12-22 | Stop reason: HOSPADM

## 2017-12-19 RX ORDER — ASPIRIN 81 MG/1
81 TABLET, CHEWABLE ORAL DAILY PRN
Status: DISCONTINUED | OUTPATIENT
Start: 2017-12-19 | End: 2017-12-22 | Stop reason: HOSPADM

## 2017-12-19 RX ADMIN — SUCRALFATE 1 G: 1 SUSPENSION ORAL at 20:00

## 2017-12-19 RX ADMIN — NORGESTIMATE AND ETHINYL ESTRADIOL 1 TABLET: KIT at 21:12

## 2017-12-19 RX ADMIN — AMITRIPTYLINE HYDROCHLORIDE 50 MG: 50 TABLET, FILM COATED ORAL at 21:12

## 2017-12-19 RX ADMIN — ASPIRIN 81 MG CHEWABLE TABLET 81 MG: 81 TABLET CHEWABLE at 12:08

## 2017-12-19 RX ADMIN — GUANFACINE HYDROCHLORIDE 3 MG: 1 TABLET ORAL at 20:03

## 2017-12-19 RX ADMIN — HYDROXYZINE HYDROCHLORIDE 50 MG: 25 TABLET ORAL at 20:00

## 2017-12-19 RX ADMIN — SUCRALFATE 1 G: 1 SUSPENSION ORAL at 16:59

## 2017-12-19 RX ADMIN — ACETAMINOPHEN, ASPIRIN AND CAFFEINE 1 TABLET: 250; 250; 65 TABLET, FILM COATED ORAL at 21:12

## 2017-12-19 RX ADMIN — ONDANSETRON 8 MG: 4 TABLET, ORALLY DISINTEGRATING ORAL at 20:00

## 2017-12-19 RX ADMIN — COLCHICINE 0.6 MG: 0.6 TABLET, FILM COATED ORAL at 21:12

## 2017-12-19 ASSESSMENT — ACTIVITIES OF DAILY LIVING (ADL)
SWALLOWING: 2-->DIFFICULTY SWALLOWING LIQUIDS/FOODS
FALL_HISTORY_WITHIN_LAST_SIX_MONTHS: YES
TRANSFERRING: 0-->INDEPENDENT
RETIRED_EATING: 0-->INDEPENDENT
COGNITION: 0 - NO COGNITION ISSUES REPORTED
BATHING: 0-->INDEPENDENT
NUMBER_OF_TIMES_PATIENT_HAS_FALLEN_WITHIN_LAST_SIX_MONTHS: 4
DRESS: 0-->INDEPENDENT
RETIRED_COMMUNICATION: 0-->UNDERSTANDS/COMMUNICATES WITHOUT DIFFICULTY
TOILETING: 0-->INDEPENDENT
WHICH_OF_THE_ABOVE_FUNCTIONAL_RISKS_HAD_A_RECENT_ONSET_OR_CHANGE?: FALL HISTORY
AMBULATION: 0-->INDEPENDENT
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
## 2017-12-19 NOTE — IP AVS SNAPSHOT
MRN:[id number]                      After Visit Summary   12/19/2017    Samara Oropeza    MRN: [id number]           Thank you!     Thank you for choosing [facility] for your care. Our goal is always to provide you with excellent care. Hearing back from our patients is one way we can continue to improve our services. Please take a few minutes to complete the written survey that you may receive in the mail after you visit with us. Thank you!        Patient Information     Date Of Birth          1995        Designated Caregiver       Most Recent Value    Caregiver    Will someone help with your care after discharge? yes    Name of designated caregiver Avi Murray    Phone number of caregiver [phone]    Caregiver address [address] [address]      About your hospital stay     You were admitted on:  December 19, 2017 You last received care in the:  [address] [facility]    You were discharged on:  December 22, 2017        Reason for your hospital stay       You were admitted to the hospital for video EEG to characterize spells of convulsion, unconsciousness without convulsion and staring. You had an event of staring that had no EEG correlation. A convulsive spell was not recorded during the hospitalization. 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 These can be an unusual physical manifestation of underlying trauma and past and present stressors. No anti-seizure medication is needed to treat this and best treatment is continued follow-up with a psychologist for psychotherapy and psychiatrist for management of underlying mental health issues, such as depression and anxiety.                  Who to Call     For medical emergencies, please call 911.  For non-urgent questions about your medical care, please call your primary care provider or clinic, [phone]          Attending Provider     Provider Specialty    Isis Kim MD Neurology        Primary Care Provider Office Phone # Fax #    EVI Cardona [facility] [phone]       When to contact your care team       Call [facility] at [phone] for questions related to your hospitalization.                  After Care Instructions     Activity       State laws prohibit operating a motor vehicle following any seizure or other episode with loss of awareness, or inability to sit up (Varies by state, be aware and comply with the regulations applicable to you). State law may require the licensed  to report any future episode to the DMV . Avoid any activities that might lead to self-injury or injury of others following any event with impaired awareness or impaired motor control. Such activities include but are not limited to holding babies or young children at heights from which they might be injured if dropped, bathing infants or young children in situations in which they might drown without continuous interactive care by an adult who is fully capable at all times during the bath, operating power cutting or other tools, handling firearms, exposure to heights from which you might fall, exposure to vessels with hot cooking oil or water, and swimming alone.            Diet       Resume home diet. Be sure to drink plenty of water and use more salt on your foods.            Discharge Instructions       Try to maintain a regular daily routine, with 8 hours of sleep, regular healthy meals, and routine activity/exercise.   Learn stress reduction techniques, such as controlled breathing exercises and meditation/mindfullness.   Avoid getting over tired, alcohol, and unprescribed drugs.  Avoid herbal remedies, as these may contain substances that alter how your body handles medications, or have undesirable effects on you.  Discuss any over the counter medications with your pharmacist or other health care provider.      Knowing how to relax is not something our society teaches.  "In fact, it teaches us to do the opposite. The relaxation response was developed by the [place]  and physician Dr. Familia Oleary. You can google both \"Familia Oleary\" and \"Relaxation Response\" to find a multitude of information. Breath work is the center of this process and can reduce anxiety and stress markedly.    Dr. Oleary talks about the need for four things:    1. A quiet environment    3. A passive mental attitude  2. A comfortable position    4. A mental device    The passive mental attitude means that when distracting thoughts come into your mind while deep breathing, you do not  them. Instead of saying to yourself, \"Darn, I just can't calm my mind,\" say, \"Oh, that was interesting,\" and then refocus on the breath. Our brains are a complex network of connections that were meant to fire. It takes practice, lots of practice to focus and breathe. Try not to  your thoughts as good or bad, they just are.    The mental device can be just about anything. It can be meditation, prayer, saying one sound out loud, whatever you want it to be. The key is to breathe from your belly. Place your hand on your belly. As you inhale to the count of 3 through your nose, feel your hand rising. This helps to remind you to get the breath all the way down into the belly. Exhale to the count of 6 (or double whatever the count of the inhale) through pursed lips. Pursed lips keep the exhale lasting longer and decreases the loss of carbon dioxide. When we breathe fast from our chest, we lose too much carbon dioxide which can cause many of the physical symptoms of anxiety (tremor, lightheadedness, ringing in the ear, feeling of unreality).     So, inhale through your nose, think 'All is well,' Exhale through pursed lips, thinking 'This and every day'. Do this twice a day, first thing in the morning and before dinner in the evening. Do it for a minimum of 10 minutes, a maximum of 20 minutes (Dr. Familia Oleary's " "Relaxation Response). These two phrases are just suggestions. Change it to whatever resonates with you.    Remember, practice this daily, so that you will have this skill when you need it most.     (Text used with permission from Annie Kitchen, RN, BA, HN-BC)    Books that may help :-  Taking Control of Your Seizures: Workbook (Treatments That Work) Workbook Edition  by Kee Haq  (Author), Lorna Marie (Author), Remedios Haq (Author), WINTER Contreras (Author)  ---  Treating Nonepileptic Seizures: Therapist Guide (Treatments That Work)   by WINTER Contreras (Author), Micha Sierra (Author)  ---    \"The primary aim of 'Taking Control of Your Seizures: Workbook' is to improve the lives of patients with seizures. Both epileptic seizures and nonepileptic seizures (VEE) are prevalent and potentially disabling. The Workbook is designed to be used by a patient with seizures in conjunction with his or her counselor. The Workbook contains step-by-step guidelines that enable patients to take control of their seizures and their lives. The  'Treating Nonepileptic Seizures: Therapist Guide' enhances effectiveness by providing zilisgy-nr-opeapec instructions for counselors who use the Workbook with patients with VEE. The authors developed this treatment approach based on extensive clinical experience and research with epilepsy and VEE. Many patients who have completed the Taking Control process experience fewer seizures, reduced symptoms, and a greater sense of well-being.\"                  Follow-up Appointments     [facility]ult [facility] Follow-up and recommended labs and tests       On Tuesday December 26th at 11:30 am a nurse coordinator will call you to check in after your discharge.  On Tuesday February 13th at 2:30 pm you have an office visit with Dr. Flores at the [address] [place] [address] location.     Call [phone] to set up care with Dr. Scott Bangura (Psychiatry)                  Your next 10 appointments " already scheduled     Dec 26, 2017 11:30 AM CST   Telephone Call with [facility] Nurse 2   [facility] ([facility])    [address], [address]  [facility] [id number] [phone]           Note: this is not an onsite visit; there is no need to come to the facility.            Jan 03, 2018  1:00 PM CST   Colonoscopy with Arian Rollins MD   [facility] ([facility])    [address]  [address]  [facility] [id number] [phone]            Jan 16, 2018 11:30 AM CST   [facility] with Austen Marquez [facility]   [facility]    [address] [id number] [phone]            Jan 17, 2018  2:20 PM CST   (Arrive by 2:05 PM)   New Patient Visit with Estela Cuenca MD   [facility] ([facility])    [address]  [address] [facility] [id number] [phone]            Jan 24, 2018  2:00 PM CST   (Arrive by 1:45 PM)   Return Visit with EVI Vizcaino [facility] ([facility])    [address] [place] [address]  [address] [facility] [id number] [phone]            Feb 13, 2018  2:30 PM CST   (Arrive by 2:15 PM)   Return Seizure with Sigifredo Flores MD   [facility] ([facility])    [address]  [address] [facility] [id number] [phone]            Feb 27, 2018 10:15 AM CST   Return Visit with Cata Hurst NP   [facility]    [address]  [address]  [facility] [address] [phone]            Feb 28, 2018  3:00 PM CST   (Arrive by 2:45 PM)   Return Botox with Frederick Bender MD   [facility] ([facility])    [address] [place] [address]  "Floor  [facility] [address] [phone]              Pending Results     Date and Time Order Name Status Description    12/22/2017 0832 EKG 12-lead, tracing only Preliminary             Statement of Approval     Ordered          12/22/17 1251  I have reviewed and agree with all the recommendations and orders detailed in this document.  EFFECTIVE NOW     Approved and electronically signed by:  Isis Kim MD             Admission Information     Date & Time Provider Department Dept. Phone    12/19/2017 Isis Kim MD [address] [facility] [phone]      Your Vitals Were     Blood Pressure Pulse Temperature Respirations Height Weight    115/69 (BP Location: Right arm) 83 96.8  F (36  C) (Oral) 16 1.6 m (5' 3\") 68.9 kg (152 lb)    Last Period Pulse Oximetry BMI (Body Mass Index)             11/28/2017 (Exact Date) 98% 26.93 kg/m2         [facility] Information     [facility] gives you secure access to your electronic health record. If you see a primary care provider, you can also send messages to your care team and make appointments. If you have questions, please call your primary care clinic.  If you do not have a primary care provider, please call [phone] and they will assist you.        Care EveryWhere ID     This is your Care EveryWhere ID. This could be used by other organizations to access your [facility] medical records  [id number]        Equal Access to Services     NABIL GALAENO AH: Brandon Santiago, waluigida bryant, qaybta kaalmike solorzano. So [facility] [phone].    ATENCIÓN: Si habla español, tiene a livingston disposición servicios gratuitos de asistencia lingüística. Jesus al [phone].    We comply with applicable federal civil rights laws and [place] laws. We do not discriminate on the basis of race, color, national origin, age, disability, sex, sexual orientation, or gender identity.               Review of your medicines      START " taking        Dose / Directions    clindamycin 300 MG capsule   Commonly known as:  CLEOCIN   Indication:  Infection of the Skin and/or Related Soft Tissue   Used for:  Oral infection        Dose:  300 mg   Take 1 capsule (300 mg) by mouth 3 times daily Last dose 12/31/17.   Quantity:  28 capsule   Refills:  0         CONTINUE these medicines which may have CHANGED, or have new prescriptions. If we are uncertain of the size of tablets/capsules you have at home, strength may be listed as something that might have changed.        Dose / Directions    apremilast 30 MG tablet   Commonly known as:  OTEZLA   This may have changed:    - how much to take  - when to take this  - additional instructions   Used for:  Behcet's disease (H)        30mg twice daily.   Quantity:  180 tablet   Refills:  0       * botulinum toxin type A 100 UNITS injection   Commonly known as:  BOTOX   This may have changed:  Another medication with the same name was removed. Continue taking this medication, and follow the directions you see here.   Used for:  Cervical dystonia        Dose:  200 Units   Inject 200 Units into the muscle every 3 months   Quantity:  200 Units   Refills:  3       * BOTOX IJ   This may have changed:  Another medication with the same name was removed. Continue taking this medication, and follow the directions you see here.        Dose:  165 Units   Inject 165 Units into the muscle once Lot /C3 Exp 06/2020   Refills:  0       linaclotide 290 MCG capsule   Commonly known as:  LINZESS   This may have changed:    - when to take this  - additional instructions   Used for:  Irritable bowel syndrome        Dose:  290 mcg   Take 1 capsule (290 mcg) by mouth every morning (before breakfast)   Quantity:  90 capsule   Refills:  1       * Notice:  This list has 2 medication(s) that are the same as other medications prescribed for you. Read the directions carefully, and ask your doctor or other care provider to review them with  you.      CONTINUE these medicines which have NOT CHANGED        Dose / Directions    albuterol 108 (90 BASE) MCG/ACT Inhaler   Commonly known as:  PROAIR HFA/PROVENTIL HFA/VENTOLIN HFA   Used for:  Atypical chest pain        Dose:  2 puff   Inhale 2 puffs into the lungs every 6 hours as needed for shortness of breath / dyspnea or wheezing   Quantity:  1 Inhaler   Refills:  1       amitriptyline 50 MG tablet   Commonly known as:  ELAVIL   Used for:  Abdominal pain, generalized, Insomnia due to medical condition        Dose:  50 mg   Take 1 tablet (50 mg) by mouth At Bedtime   Quantity:  30 tablet   Refills:  11       ASPIRIN CHILDRENS 81 MG chewable tablet   Generic drug:  aspirin        Dose:  81 mg   Take 81 mg by mouth as needed   Refills:  0       aspirin-acetaminophen-caffeine 250-250-65 MG per tablet   Commonly known as:  EXCEDRIN MIGRAINE        Dose:  1 tablet   Take 1 tablet by mouth every 6 hours as needed for headaches   Refills:  0       betamethasone valerate 0.1 % cream   Commonly known as:  VALISONE        Apply topically 2 times daily   Refills:  0       carbamide peroxide 6.5 % otic solution   Commonly known as:  DEBROX        Dose:  5 drop   5 drops 2 times daily   Refills:  0       clobetasol 0.05 % cream   Commonly known as:  TEMOVATE   Used for:  Folliculitis        Apply topically 2 times daily   Quantity:  60 g   Refills:  0       Colchicine 0.6 MG Caps   Used for:  Behcet's syndrome (H)        Dose:  1 capsule   Take 0.6 mg by mouth See Admin Instructions 2 capsules in AM and 1 capsule in PM   Quantity:  90 capsule   Refills:  3       COMPOUNDED NON-CONTROLLED SUBSTANCE - PHARMACY TO MIX COMPOUNDED MEDICATION   Commonly known as:  CMPD RX   Used for:  Fibromyalgia        Take one capsule in the morning   Quantity:  30 capsule   Refills:  0       diclofenac 1 % Gel topical gel   Commonly known as:  VOLTAREN   Used for:  Polyarthralgia        Apply 2-4 grams to affected area(s) up to 4 times  per day as needed. This is an anti-inflammatory medication.   Quantity:  100 g   Refills:  4       dicyclomine 20 MG tablet   Commonly known as:  BENTYL   Used for:  Abdominal cramping        Dose:  20 mg   Take 1 tablet (20 mg) by mouth 4 times daily as needed   Quantity:  60 tablet   Refills:  1       diltiazem 2% in PLO cream (FV COMPOUNDED) 2% Gel   Used for:  Anal fissure        Apply small pea size amount three times daily to anus until pain is gone.   Quantity:  30 g   Refills:  1       diphenhydrAMINE 25 MG tablet   Commonly known as:  BENADRYL        Dose:  25 mg   Take 1 tablet (25 mg) by mouth every 8 hours as needed for allergies   Quantity:  30 tablet   Refills:  0       EPINEPHrine 0.3 MG/0.3ML injection 2-pack   Commonly known as:  EPIPEN 2-EAMON   Used for:  Hx of bee sting allergy        Dose:  0.3 mg   Inject 0.3 mLs (0.3 mg) into the muscle as needed for anaphylaxis   Quantity:  0.6 mL   Refills:  3       guanFACINE 1 MG tablet   Commonly known as:  TENEX   Used for:  Tic        Dose:  3 mg   Take 3 tablets (3 mg) by mouth 2 times daily   Quantity:  270 tablet   Refills:  3       hydrOXYzine 25 MG tablet   Commonly known as:  ATARAX   Used for:  TAHIR (generalized anxiety disorder)        Dose:  25-50 mg   Take 1-2 tablets (25-50 mg) by mouth every 6 hours as needed for anxiety   Quantity:  90 tablet   Refills:  1       hyoscyamine 0.125 MG tablet   Commonly known as:  ANASPAZ/LEVSIN   Used for:  Abdominal pain, generalized        Dose:  0.125-0.25 mg   Take 1-2 tablets (125-250 mcg) by mouth every 4 hours as needed for cramping   Quantity:  40 tablet   Refills:  1       hypromellose 0.3 % Soln ophthalmic solution   Commonly known as:  GENTEAL        Dose:  1 drop   1 drop every hour as needed for dry eyes   Refills:  0       LACTAID PO        Take by mouth daily   Refills:  0       lactulose 20 GM/30ML Soln   Used for:  Constipation, unspecified constipation type        Dose:  30 mL   Take 30 mLs by  mouth 3 times daily as needed   Quantity:  300 mL   Refills:  3       lidocaine 2 % topical gel   Commonly known as:  XYLOCAINE   Indication:  Anesthesia to a Specific Part of the Body        Dose:  1 Tube   Apply 1 Tube topically daily Reported on 4/21/2017   Refills:  0       lidocaine visc 2% 2.5mL/5mL & maalox/mylanta w/ simeth 2.5mL/5mL & diphenhydrAMINE 5mg/5mL Susp suspension   Commonly known as:  MAGIC Mouthwash [facility]   Used for:  Behcet's syndrome (H)        Dose:  10 mL   Swish and swallow 10 mLs in mouth every 6 hours as needed for mouth sores   Quantity:  1 Bottle   Refills:  1       LORazepam 1 MG tablet   Commonly known as:  ATIVAN   Used for:  Chronic abdominal pain        Dose:  0.5 mg   Take 0.5 tablets (0.5 mg) by mouth 2 times daily as needed for other (atypical chest pain) Do not operate a vehicle after taking this medication   Quantity:  60 tablet   Refills:  0       meclizine 25 MG tablet   Commonly known as:  ANTIVERT   Used for:  Nausea        Dose:  25 mg   Take 1 tablet (25 mg) by mouth every 6 hours as needed for dizziness   Quantity:  30 tablet   Refills:  1       omeprazole 40 MG capsule   Commonly known as:  priLOSEC   Used for:  Gastroesophageal reflux disease, esophagitis presence not specified        Dose:  40 mg   Take 1 capsule (40 mg) by mouth daily   Quantity:  90 capsule   Refills:  3       ondansetron 8 MG ODT tab   Commonly known as:  ZOFRAN-ODT   Used for:  Non-intractable vomiting with nausea, unspecified vomiting type, Hematemesis, presence of nausea not specified        Dose:  8 mg   Take 1 tablet (8 mg) by mouth every 8 hours as needed for nausea   Quantity:  60 tablet   Refills:  6       prazosin 1 MG capsule   Commonly known as:  MINIPRESS   Used for:  PTSD (post-traumatic stress disorder)        Dose:  1 mg   Take 1 capsule (1 mg) by mouth At Bedtime For nightmares.   Quantity:  30 capsule   Refills:  1       SPRINTEC 28 0.25-35 MG-MCG per tablet   Used for:   General counseling for prescription of oral contraceptives   Generic drug:  norgestimate-ethinyl estradiol        TAKE 1 TABLET BY MOUTH ONCE DAILY.   Quantity:  84 tablet   Refills:  2       sucralfate 1 GM/10ML suspension   Commonly known as:  CARAFATE   Used for:  Bile reflux gastritis, Nausea        Dose:  1 g   Take 10 mLs (1 g) by mouth 4 times daily   Quantity:  1200 mL   Refills:  2       triamcinolone 0.1 % cream   Commonly known as:  KENALOG   Used for:  Behcet's syndrome (H)        Apply sparingly to oral ulcers three times daily for 14 days as needed.   Quantity:  15 g   Refills:  1            Where to get your medicines      These medications were sent to [address] [place] [address] [place] [address] [place] [address]   [address] [facility] [address]     Phone:  [phone]     clindamycin 300 MG capsule               ANTIBIOTIC INSTRUCTION     You've Been Prescribed an Antibiotic - Now What?  Your healthcare team thinks that you or your loved one might have an infection. Some infections can be treated with antibiotics, which are powerful, life-saving drugs. Like all medications, antibiotics have side effects and should only be used when necessary. There are some important things you should know about your antibiotic treatment.      Your healthcare team may run tests before you start taking an antibiotic.    Your team may take samples (e.g., from your blood, urine or other areas) to run tests to look for bacteria. These test can be important to determine if you need an antibiotic at all and, if you do, which antibiotic will work best.      Within a few days, your healthcare team might change or even stop your antibiotic.    Your team may start you on an antibiotic while they are working to find out what is making you sick.    Your team might change your antibiotic because test results show that a different antibiotic would be better to treat your infection.    In some cases, once your team has more  information, they learn that you do not need an antibiotic at all. They may find out that you don't have an infection, or that the antibiotic you're taking won't work against your infection. For example, an infection caused by a virus can't be treated with antibiotics. Staying on an antibiotic when you don't need it is more likely to be harmful than helpful.      You may experience side effects from your antibiotic.    Like all medications, antibiotics have side effects. Some of these can be serious.    Let you healthcare team know if you have any known allergies when you are admitted to the hospital.    One significant side effect of nearly all antibiotics is the risk of severe and sometimes deadly diarrhea caused by Clostridium difficile (C. Difficile). This occurs when a person takes antibiotics because some good germs are destroyed. Antibiotic use allows C. diificile to take over, putting patients at high risk for this serious infection.    As a patient or caregiver, it is important to understand your or your loved one's antibiotic treatment. It is especially important for caregivers to speak up when patients can't speak for themselves. Here are some important questions to ask your healthcare team.    What infection is this antibiotic treating and how do you know I have that infection?    What side effects might occur from this antibiotic?    How long will I need to take this antibiotic?    Is it safe to take this antibiotic with other medications or supplements (e.g., vitamins) that I am taking?     Are there any special directions I need to know about taking this antibiotic? For example, should I take it with food?    How will I be monitored to know whether my infection is responding to the antibiotic?    What tests may help to make sure the right antibiotic is prescribed for me?      Information provided by:  www.cdc.gov/getsmart  U.S. Department of Health and Human Services  Centers for disease Control and  Prevention  National Center for Emerging and Zoonotic Infectious Diseases  Division of Healthcare Quality Promotion         Protect others around you: Learn how to safely use, store and throw away your medicines at www.disposemymeds.org.             Medication List: This is a list of all your medications and when to take them. Check marks below indicate your daily home schedule. Keep this list as a reference.      Medications           Morning Afternoon Evening Bedtime As Needed    albuterol 108 (90 BASE) MCG/ACT Inhaler   Commonly known as:  PROAIR HFA/PROVENTIL HFA/VENTOLIN HFA   Inhale 2 puffs into the lungs every 6 hours as needed for shortness of breath / dyspnea or wheezing                                amitriptyline 50 MG tablet   Commonly known as:  ELAVIL   Take 1 tablet (50 mg) by mouth At Bedtime   Last time this was given:  50 mg on 12/21/2017  9:41 PM                                apremilast 30 MG tablet   Commonly known as:  OTEZLA   30mg twice daily.   Last time this was given:  30 mg on 12/22/2017 12:28 PM                                ASPIRIN CHILDRENS 81 MG chewable tablet   Take 81 mg by mouth as needed   Last time this was given:  81 mg on 12/21/2017 12:48 AM   Generic drug:  aspirin                                aspirin-acetaminophen-caffeine 250-250-65 MG per tablet   Commonly known as:  EXCEDRIN MIGRAINE   Take 1 tablet by mouth every 6 hours as needed for headaches   Last time this was given:  1 tablet on 12/21/2017  8:26 AM                                betamethasone valerate 0.1 % cream   Commonly known as:  VALISONE   Apply topically 2 times daily                                * botulinum toxin type A 100 UNITS injection   Commonly known as:  BOTOX   Inject 200 Units into the muscle every 3 months                                * BOTOX IJ   Inject 165 Units into the muscle once Lot /C3 Exp 06/2020                                carbamide peroxide 6.5 % otic solution   Commonly  known as:  DEBROX   5 drops 2 times daily                                clindamycin 300 MG capsule   Commonly known as:  CLEOCIN   Take 1 capsule (300 mg) by mouth 3 times daily Last dose 12/31/17.   Last time this was given:  300 mg on 12/22/2017  6:37 AM                                clobetasol 0.05 % cream   Commonly known as:  TEMOVATE   Apply topically 2 times daily                                Colchicine 0.6 MG Caps   Take 0.6 mg by mouth See Admin Instructions 2 capsules in AM and 1 capsule in PM                                COMPOUNDED NON-CONTROLLED SUBSTANCE - PHARMACY TO MIX COMPOUNDED MEDICATION   Commonly known as:  CMPD RX   Take one capsule in the morning                                diclofenac 1 % Gel topical gel   Commonly known as:  VOLTAREN   Apply 2-4 grams to affected area(s) up to 4 times per day as needed. This is an anti-inflammatory medication.                                dicyclomine 20 MG tablet   Commonly known as:  BENTYL   Take 1 tablet (20 mg) by mouth 4 times daily as needed                                diltiazem 2% in PLO cream (FV COMPOUNDED) 2% Gel   Apply small pea size amount three times daily to anus until pain is gone.                                diphenhydrAMINE 25 MG tablet   Commonly known as:  BENADRYL   Take 1 tablet (25 mg) by mouth every 8 hours as needed for allergies                                EPINEPHrine 0.3 MG/0.3ML injection 2-pack   Commonly known as:  EPIPEN 2-EAMON   Inject 0.3 mLs (0.3 mg) into the muscle as needed for anaphylaxis                                guanFACINE 1 MG tablet   Commonly known as:  TENEX   Take 3 tablets (3 mg) by mouth 2 times daily   Last time this was given:  3 mg on 12/22/2017 12:16 PM                                hydrOXYzine 25 MG tablet   Commonly known as:  ATARAX   Take 1-2 tablets (25-50 mg) by mouth every 6 hours as needed for anxiety   Last time this was given:  50 mg on 12/19/2017  8:00 PM                                 hyoscyamine 0.125 MG tablet   Commonly known as:  ANASPAZ/LEVSIN   Take 1-2 tablets (125-250 mcg) by mouth every 4 hours as needed for cramping   Last time this was given:  250 mcg on 12/21/2017  9:41 PM                                hypromellose 0.3 % Soln ophthalmic solution   Commonly known as:  GENTEAL   1 drop every hour as needed for dry eyes                                LACTAID PO   Take by mouth daily                                lactulose 20 GM/30ML Soln   Take 30 mLs by mouth 3 times daily as needed                                lidocaine 2 % topical gel   Commonly known as:  XYLOCAINE   Apply 1 Tube topically daily Reported on 4/21/2017                                lidocaine visc 2% 2.5mL/5mL & maalox/mylanta w/ simeth 2.5mL/5mL & diphenhydrAMINE 5mg/5mL Susp suspension   Commonly known as:  MAGIC [facility]   Swish and swallow 10 mLs in mouth every 6 hours as needed for mouth sores                                linaclotide 290 MCG capsule   Commonly known as:  LINZESS   Take 1 capsule (290 mcg) by mouth every morning (before breakfast)   Last time this was given:  290 mcg on 12/20/2017  9:01 AM                                LORazepam 1 MG tablet   Commonly known as:  ATIVAN   Take 0.5 tablets (0.5 mg) by mouth 2 times daily as needed for other (atypical chest pain) Do not operate a vehicle after taking this medication                                meclizine 25 MG tablet   Commonly known as:  ANTIVERT   Take 1 tablet (25 mg) by mouth every 6 hours as needed for dizziness                                omeprazole 40 MG capsule   Commonly known as:  priLOSEC   Take 1 capsule (40 mg) by mouth daily   Last time this was given:  40 mg on 12/22/2017 12:17 PM                                ondansetron 8 MG ODT tab   Commonly known as:  ZOFRAN-ODT   Take 1 tablet (8 mg) by mouth every 8 hours as needed for nausea   Last time this was given:  8 mg on 12/22/2017  6:37 AM                                 prazosin 1 MG capsule   Commonly known as:  MINIPRESS   Take 1 capsule (1 mg) by mouth At Bedtime For nightmares.                                SPRINTEC 28 0.25-35 MG-MCG per tablet   TAKE 1 TABLET BY MOUTH ONCE DAILY.   Last time this was given:  1 tablet on 12/21/2017  9:41 PM   Generic drug:  norgestimate-ethinyl estradiol                                sucralfate 1 GM/10ML suspension   Commonly known as:  CARAFATE   Take 10 mLs (1 g) by mouth 4 times daily   Last time this was given:  1 g on 12/22/2017 12:16 PM                                triamcinolone 0.1 % cream   Commonly known as:  KENALOG   Apply sparingly to oral ulcers three times daily for 14 days as needed.                                * Notice:  This list has 2 medication(s) that are the same as other medications prescribed for you. Read the directions carefully, and ask your doctor or other care provider to review them with you.

## 2017-12-19 NOTE — IP AVS SNAPSHOT
[address] [address]    Phone:  [phone]                                       After Visit Summary   12/19/2017    Samara Oropeza    MRN: [id number]           After Visit Summary Signature Page     I have received my discharge instructions, and my questions have been answered. I have discussed any challenges I see with this plan with the nurse or doctor.    ..........................................................................................................................................  Patient/Patient Representative Signature      ..........................................................................................................................................  Patient Representative Print Name and Relationship to Patient    ..................................................               ................................................  Date                                            Time    ..........................................................................................................................................  Reviewed by Signature/Title    ...................................................              ..............................................  Date                                                            Time

## 2017-12-19 NOTE — PLAN OF CARE
Problem: Seizure Disorder/Epilepsy (Adult)  Goal: Signs and Symptoms of Listed Potential Problems Will be Absent, Minimized or Managed (Seizure Disorder/Epilepsy)  Signs and symptoms of listed potential problems will be absent, minimized or managed by discharge/transition of care (reference Seizure Disorder/Epilepsy (Adult) CPG).   Outcome: No Change  Pt new video admit. VSS. C/O pain in left neck/jaw, aspirin given (pt declined mouth rinse). Neuros at baseline, intact with blurry vision at times. Pt felt like she might have had an event this afternoon, MDs in room shortly after. Up with SBA and GB. Pt does have history of falls with events. Needs UA next times pt is up.

## 2017-12-19 NOTE — PROGRESS NOTES
REASON FOR VISIT:  Samara Oropeza is a 22-year-old female with a complex medical and surgical history.  The patient presents with severe abdominal pain associated with blood per rectum.  The patient has findings on a CT scan suggesting a possible partial malrotation or a hypermobile cecum.  The patient currently has no problems.  She is pain-free at this point and presents with her mother and significant other to the [facility].      PHYSICAL EXAMINATION:  Samara Oropeza moves easily to the examination table.  Her abdomen is soft and nontender.  There are no masses, tenderness or other abnormalities.  There are no hernias.  The remainder of her examination is unremarkable.  Extremities are warm and well perfused.      IMPRESSION/PLAN:  Samara Oropeza presents with possible malrotation or hypermobile cecum.  I discussed this patient with Dr. Vitor Coburn would be very interested to pursue additional testing and perhaps perform laparoscopic lap band procedure should this be indicated.  The patient understands the need for additional expertise in this area and she agrees to pursue the additional testing.  This will occur in the very near future.  The patient will call or return should she get into difficulties or problems in the interim.

## 2017-12-19 NOTE — MR AVS SNAPSHOT
After Visit Summary   12/19/2017    Samara Oropeza    MRN: [id number]           Patient Information     Date Of Birth          1995        Visit Information        Provider Department      12/19/2017 9:15 AM [facility] TECH 4 [facility]        Today's Diagnoses     Convulsions, unspecified convulsion type (H)        Transient alteration of awareness           Follow-ups after your visit        Your next 10 appointments already scheduled     Jan 03, 2018  1:00 PM CST   Colonoscopy with Arian Rollins MD   [facility]    [address]  [address]  [facility] [address]   [phone]            Jan 16, 2018 11:30 AM CST   Return Visit with Austen Marquez MD   [facility]    [address] [id number] [phone]            Jan 17, 2018  2:20 PM CST   (Arrive by 2:05 PM)   New Patient Visit with Estela Cuenca MD   [facility] ([facility])    [address] [place] [id number] [phone]            Jan 24, 2018  2:00 PM CST   (Arrive by 1:45 PM)   Return Visit with EVI Vizcaino CNP   [facility] ([facility])    [address] [place] [id number] [phone]            Feb 13, 2018  2:30 PM CST   (Arrive by 2:15 PM)   Return Seizure with Sigifredo Flores MD   [facility] ([facility])    [address] [place] [address]  [address]rd [facility] [id number] [phone]            Feb 27, 2018 10:15 AM CST   [facility]st [facility]    [address]  [address]  [facility] [id number] [phone]            Feb 28, 2018  3:00 PM CST   (Arrive by 2:45 PM)   Return  Daren with Frederick Bender MD   [facility] ([facility])    [address]  [address]rd [facility] [address] [phone]              Future tests that were ordered for you today     Open Future Orders        Priority Expected Expires Ordered    XR Upper GI w Small Bowel Follow Through Routine 12/18/2017 12/18/2018 12/18/2017            Who to contact     Please call your clinic at [phone] to:    Ask questions about your health    Make or cancel appointments    Discuss your medicines    Learn about your test results    Speak to your doctor   If you have compliments or concerns about an experience at your clinic, or if you wish to file a complaint, please contact [facility] Patient Relations at [phone] or email us at [email or web address]         Additional Information About Your Visit        [email or web address] Information     Go[employer] gives you secure access to your electronic health record. If you see a primary care provider, you can also send messages to your care team and make appointments. If you have questions, please call your primary care clinic.  If you do not have a primary care provider, please call [phone] and they will assist you.      [employer] is an electronic gateway that provides easy, online access to your medical records. With [employer], you can request a clinic appointment, read your test results, renew a prescription or communicate with your care team.     To access your existing account, please contact your [facility] Clinic or call [phone] for assistance.        Care EveryWhere ID     This is your Care EveryWhere ID. This could be used by other organizations to access your [place] medical records  [id number]        Your Vitals Were     Last Period                   11/28/2017 (Exact Date)            Blood Pressure from Last 3 Encounters:   12/18/17  131/76   12/12/17 110/64   12/06/17 109/63    Weight from Last 3 Encounters:   12/18/17 69.3 kg (152 lb 11.2 oz)   12/12/17 70.8 kg (156 lb)   12/06/17 66.2 kg (146 lb)              We Performed the Following     EEG video monitoring          Today's Medication Changes      Notice     This visit is during an admission. Changes to the med list made in this visit will be reflected in the After Visit Summary of the admission.             Primary Care Provider Office Phone # Fax #    Sonja Winchester Brady, APRN [facility] [phone]       [address] [facility] [address]  [facility] [address]        Equal Access to Services     NABIL GALEANO : Brandon Santiago, channing hurtdao, clover kaalmamariya juan, mike rodriguez . So [facility] [phone].    ATENCIÓN: Si habla español, tiene a livingston disposición servicios gratuitos de asistencia lingüística. Llame al [phone].    We comply with applicable federal civil rights laws and [place] laws. We do not discriminate on the basis of race, color, national origin, age, disability, sex, sexual orientation, or gender identity.            Thank you!     Thank you for choosing [facility]  for your care. Our goal is always to provide you with excellent care. Hearing back from our patients is one way we can continue to improve our services. Please take a few minutes to complete the written survey that you may receive in the mail after your visit with us. Thank you!             Your Updated Medication List - Protect others around you: Learn how to safely use, store and throw away your medicines at www.disposemymeds.org.      Notice     This visit is during an admission. Changes to the med list made in this visit will be reflected in the After Visit Summary of the admission.

## 2017-12-19 NOTE — TELEPHONE ENCOUNTER
Pt is admitted to [facility] of  unit [facility] .  Pt is having a flare , she has lesion in her nose and mouth, fells like in her neck, having a hard time talking. Pt is admitted for video EEG for 5-10 days .  Spoke to Dr. Marquez explains that pt needs to let her admitting doctor know what's going on and they will contact the on-call rheumatologist.      Pt notified. Leydi Vega LPN

## 2017-12-20 ENCOUNTER — ALLIED HEALTH/NURSE VISIT (OUTPATIENT)
Dept: NEUROLOGY | Facility: CLINIC | Age: 22
DRG: 101 | End: 2017-12-20
Attending: PSYCHIATRY & NEUROLOGY
Payer: COMMERCIAL

## 2017-12-20 DIAGNOSIS — R56.9 CONVULSIONS, UNSPECIFIED CONVULSION TYPE (H): Primary | ICD-10-CM

## 2017-12-20 PROCEDURE — 25000125 ZZHC RX 250: Performed by: PHYSICIAN ASSISTANT

## 2017-12-20 PROCEDURE — 25000132 ZZH RX MED GY IP 250 OP 250 PS 637: Performed by: PSYCHIATRY & NEUROLOGY

## 2017-12-20 PROCEDURE — 95951 ZZHC EEG VIDEO EACH 24 HR: CPT | Mod: ZF

## 2017-12-20 PROCEDURE — 25000132 ZZH RX MED GY IP 250 OP 250 PS 637: Performed by: PHYSICIAN ASSISTANT

## 2017-12-20 PROCEDURE — 12000001 ZZH R&B MED SURG/OB UMMC

## 2017-12-20 RX ADMIN — GUANFACINE HYDROCHLORIDE 3 MG: 1 TABLET ORAL at 21:08

## 2017-12-20 RX ADMIN — SUCRALFATE 1 G: 1 SUSPENSION ORAL at 21:08

## 2017-12-20 RX ADMIN — GUANFACINE HYDROCHLORIDE 3 MG: 1 TABLET ORAL at 08:59

## 2017-12-20 RX ADMIN — SUCRALFATE 1 G: 1 SUSPENSION ORAL at 16:09

## 2017-12-20 RX ADMIN — SUCRALFATE 1 G: 1 SUSPENSION ORAL at 09:02

## 2017-12-20 RX ADMIN — LINACLOTIDE 290 MCG: 145 CAPSULE, GELATIN COATED ORAL at 09:01

## 2017-12-20 RX ADMIN — SUCRALFATE 1 G: 1 SUSPENSION ORAL at 13:43

## 2017-12-20 RX ADMIN — COLCHICINE 0.6 MG: 0.6 TABLET, FILM COATED ORAL at 21:08

## 2017-12-20 RX ADMIN — OMEPRAZOLE 40 MG: 20 CAPSULE, DELAYED RELEASE ORAL at 09:00

## 2017-12-20 RX ADMIN — COLCHICINE 1.2 MG: 0.6 TABLET, FILM COATED ORAL at 09:00

## 2017-12-20 RX ADMIN — LIDOCAINE HYDROCHLORIDE 5 ML: 20 SOLUTION ORAL; TOPICAL at 11:30

## 2017-12-20 RX ADMIN — HYOSCYAMINE SULFATE 250 MCG: 0.12 TABLET ORAL at 16:09

## 2017-12-20 RX ADMIN — AMITRIPTYLINE HYDROCHLORIDE 50 MG: 50 TABLET, FILM COATED ORAL at 21:08

## 2017-12-20 RX ADMIN — NORGESTIMATE AND ETHINYL ESTRADIOL 1 TABLET: KIT at 21:08

## 2017-12-20 RX ADMIN — ONDANSETRON 8 MG: 4 TABLET, ORALLY DISINTEGRATING ORAL at 12:18

## 2017-12-20 RX ADMIN — ONDANSETRON 8 MG: 4 TABLET, ORALLY DISINTEGRATING ORAL at 21:08

## 2017-12-20 ASSESSMENT — VISUAL ACUITY
OU: NORMAL ACUITY
OU: NORMAL ACUITY;GLASSES
OU: NORMAL ACUITY;GLASSES

## 2017-12-20 NOTE — PROGRESS NOTES
"CLINICAL NUTRITION SERVICES - ASSESSMENT NOTE     Nutrition Prescription    RECOMMENDATIONS FOR MDs/PROVIDERS TO ORDER:  Consider scheduling vs PRN anti-emetics TID prior to meal times     Malnutrition Status:    Patient does not meet criteria necessary for diagnosing malnutrition    Recommendations already ordered by Registered Dietitian (RD):  Gelatein BID btw meals     Future/Additional Recommendations:  Ongoing PO tolerance and acceptance of suppl      REASON FOR ASSESSMENT  Samara Oropeza is a 22 year old female assessed by the dietitian for Admission Nutrition Risk Screen for unintentional loss of 10# or more in the past two months    NUTRITION HISTORY  Pt reports chronic GI pain, constant nausea and constipation (1 BM q 1-2 weeks) at baseline. She reports her nausea has worsened over the past 2 months causing intermittent vomiting.    Due to food allergies, pt avoids citrus, pineapple and kiwi. She is also lactose intolerant, but can tolerate some dairy products if taken with lactaid. She eats 2-3 small meals/day of cream of wheat or fruit.      CURRENT NUTRITION ORDERS  Diet: Regular  Intake/Tolerance: Poor appetite d/t worsened nausea. Ate bites of fruit.      LABS  Labs reviewed    MEDICATIONS  Linzess   Lactose defense formula with probiotic     ANTHROPOMETRICS  Height: 0 cm (Data Unavailable)  Ht Readings from Last 2 Encounters:   12/18/17 1.6 m (5' 3\")   12/12/17 1.6 m (5' 3\")     Most Recent Weight: No weight taken upon admit. 152 lbs 0 oz on 12/20/17  IBW: 52 kg  BMI: Overweight BMI 25-29.9  Weight History: Pt reports UBW near 140-145 lbs. Appears weight trending up near ~150 lbs.  Wt Readings from Last 30 Encounters:   12/18/17 69.3 kg (152 lb 11.2 oz)   12/12/17 70.8 kg (156 lb)   12/06/17 66.2 kg (146 lb)   11/09/17 66.2 kg (146 lb)   11/03/17 66.7 kg (147 lb)   10/24/17 67.6 kg (149 lb)   10/23/17 67.1 kg (148 lb)   10/18/17 67.6 kg (149 lb)   10/17/17 67.6 kg (149 lb)   10/13/17 65.8 kg (145 " lb)   10/11/17 66.2 kg (146 lb)   10/09/17 65.8 kg (145 lb)   10/03/17 65.2 kg (143 lb 12.8 oz)   09/19/17 64.9 kg (143 lb)   09/13/17 64.9 kg (143 lb)   08/31/17 66.2 kg (146 lb)     Dosing Weight: 69 kg (actual, based on most recent weight of 69.3 kg on 12/18/17).     ASSESSED NUTRITION NEEDS  Estimated Energy Needs: 1725 - 2070 kcals/day (25 - 30 kcals/kg)  Justification: Maintenance  Estimated Protein Needs: 69 - 83 grams protein/day (1 - 1.2 grams of pro/kg)  Justification: Maintenance  Estimated Fluid Needs: 1 mL/kcal  Justification: Maintenance or per provider pending fluid status    PHYSICAL FINDINGS  See malnutrition section below.    MALNUTRITION  % Intake: </=75% for >/= 1 month (severe)  % Weight Loss: None noted  Subcutaneous Fat Loss: None observed  Muscle Loss: None observed  Fluid Accumulation/Edema: None noted  Malnutrition Diagnosis: Patient does not meet two of the above criteria necessary for diagnosing malnutrition    NUTRITION DIAGNOSIS  Inadequate oral intake related to reduced appetite secondary to GI distress (chronic nausea, GI pain, constipation) as evidenced by reports chronic inadequate PO.       INTERVENTIONS  Implementation  Nutrition Education: Discussed tips to coping with nausea.   Medical food supplement therapy   Collaboration with other providers - RN obtain weight as able.     Goals  Patient to consume % of nutritionally adequate meal trays TID, or the equivalent with supplements/snacks.     Monitoring/Evaluation  Progress toward goals will be monitored and evaluated per protocol.    Josselin Aguirre RD, LD  Pager: [phone]

## 2017-12-20 NOTE — MR AVS SNAPSHOT
After Visit Summary   12/20/2017    Samara Oropeza    MRN: [id number]           Patient Information     Date Of Birth          1995        Visit Information        Provider Department      12/20/2017 7:00 AM [facility] [[email or web address]] EEG TECH 4 [facility] EEG        Today's Diagnoses     Convulsions, unspecified convulsion type (H)    -  1       Follow-ups after your visit        Your next 10 appointments already scheduled     Dec 21, 2017  7:00 AM CST   24 Hour Video Visit with [facility] [[email or web address]] EEG TECH 4   P EEG ([facility])    [facility]  [address] [facility] [address]   [phone]           [place]: Your appointment is scheduled at [facility]. [address] [place] [address]            Jan 03, 2018  1:00 PM CST   Colonoscopy with Arian Rollins MD   [facility] ([facility])    [address] [facility] [address]  [facility] [address] [phone]            Jan 16, 2018 11:30 AM CST   Return Visit with Austen Marquez MD   [facility] ([facility])    [address] [id number] [phone]            Jan 17, 2018  2:20 PM CST   (Arrive by 2:05 PM)   New Patient Visit with Estela Cuenca MD   [facility] ([facility])    [address] [place] [address]  4th [facility] [id number] [phone]            Jan 24, 2018  2:00 PM CST   (Arrive by 1:45 PM)   Return Visit with EVI Vizcaino [facility] ([facility])    [address]  4th [facility] [id number] [phone]            Feb 13, 2018  2:30 PM CST   (Arrive by 2:15 PM)   Return Seizure with Sigifredo Flores MD   [facility] ([facility])    [address] [place] [address]  3rd [facility]  [place] [address] [phone]            Feb 27, 2018 10:15 AM CST   Return Visit with Cata Hurst NP   [facility] ([facility])    [address]  [address]  [facility] [id number] [phone]            Feb 28, 2018  3:00 PM CST   (Arrive by 2:45 PM)   Return Botox with Frederick Bender MD   [facility] ([facility])    [address] [facility]  3rd Floor  [facility] [id number] [phone]              Who to contact     Please call your clinic at [phone] to:    Ask questions about your health    Make or cancel appointments    Discuss your medicines    Learn about your test results    Speak to your doctor   If you have compliments or concerns about an experience at your clinic, or if you wish to file a complaint, please contact [facility] Patient Relations at [phone] or email us at [email or web address]         Additional Information About Your Visit        [employer] Information     [employer] gives you secure access to your electronic health record. If you see a primary care provider, you can also send messages to your care team and make appointments. If you have questions, please call your primary care clinic.  If you do not have a primary care provider, please call [phone] and they will assist you.      [employer] is an electronic gateway that provides easy, online access to your medical records. With [employer], you can request a clinic appointment, read your test results, renew a prescription or communicate with your care team.     To access your existing account, please contact your [facility] or call [phone] for assistance.        Care EveryWhere ID     This is your Care EveryWhere ID. This could be used by other organizations to access your [place] medical records  [id number]        Your Vitals Were     Last  Period                   11/28/2017 (Exact Date)            Blood Pressure from Last 3 Encounters:   12/20/17 119/80   12/18/17 131/76   12/12/17 110/64    Weight from Last 3 Encounters:   12/20/17 68.9 kg (152 lb)   12/18/17 69.3 kg (152 lb 11.2 oz)   12/12/17 70.8 kg (156 lb)              Today, you had the following     No orders found for display         Today's Medication Changes      Notice     This visit is during an admission. Changes to the med list made in this visit will be reflected in the After Visit Summary of the admission.             Primary Care Provider Office Phone # Fax #    Sonja Winchester Brady, EVI [facility] [phone]       [address] [address]        Equal Access to Services     DELISASierra Kings HospitalWILTON : Hadii malcolm ahumada Sotiffany, waaxda luqadaha, qaybta kaalmada adeegyada, mike rodriguez . So [facility] [phone].    ATENCIÓN: Si habla español, tiene a livingston disposición servicios gratuitos de asistencia lingüística. Llame al [phone].    We comply with applicable federal civil rights laws and [place] laws. We do not discriminate on the basis of race, color, national origin, age, disability, sex, sexual orientation, or gender identity.            Thank you!     Thank you for choosing [facility]  for your care. Our goal is always to provide you with excellent care. Hearing back from our patients is one way we can continue to improve our services. Please take a few minutes to complete the written survey that you may receive in the mail after your visit with us. Thank you!             Your Updated Medication List - Protect others around you: Learn how to safely use, store and throw away your medicines at www.disposemymeds.org.      Notice     This visit is during an admission. Changes to the med list made in this visit will be reflected in the After Visit Summary of the admission.

## 2017-12-20 NOTE — PLAN OF CARE
Problem: Patient Care Overview  Goal: Plan of Care/Patient Progress Review  Outcome: No Change  VSS. A&Ox4. Neuros intact ex BUE tremors. Pt had one event this shift, see previous note. Leads intact and seizure precautions in place. Given lidocaine rinse x1 for L jaw pain. Given Zofran x1 for nausea, no emesis. Pt up SBA + GB. Voiding spont. Has not had a BM for the past 5-6 days, pt has hx of chronic constipation. Regular diet (no citrus). PIV SL. Continue with POC.

## 2017-12-20 NOTE — PROGRESS NOTES
Time/length of seizure event: ongoing  Movements (head, eyes, extremities): no movement; reporting tingling in toes/feet with lightheadedness   Orientation during seizure: a&O x4  After seizure, remembers the unique phrase given during seizure: yes  After seizure, remembers an unnamed visual object shown during seizure: yes  Able to identify/name aloud item during seizure: yes  Able to follow command during seizure: yes  Able to read test sentence aloud during seizure: yes  Able to read test sentence aloud again after the seizure: yes  After seizure, remembers name of object shown during seizure: yes  Orientation and level of consciousness after seizure: WNL  VS and oxygen saturation during/after seizure: WNL see flowsheet  Recall of the event?: yes  Was this a typical seizure/event?: yes  Presence of aura or pre-seizure activity: no  Incontinence: no

## 2017-12-20 NOTE — PLAN OF CARE
Problem: Seizure Disorder/Epilepsy (Adult)  Goal: Signs and Symptoms of Listed Potential Problems Will be Absent, Minimized or Managed (Seizure Disorder/Epilepsy)  Signs and symptoms of listed potential problems will be absent, minimized or managed by discharge/transition of care (reference Seizure Disorder/Epilepsy (Adult) CPG).   Pt here for seizure monitoring. VSS except tachy at times. A&O x4. Neuro intact except blurry vision at times. Pt c/o HA controlled with PRN Excedrin. VEEG leads in place; 1 event this shift, see previous note. PIV SL. Reg diet; poor intake. Voiding spontaneously. Hx of drop seizures. Pt to be up in fuzzy thong x2 daily; refused this evening. Boyfriend in room and very supportive. Continue to monitor and follow current POC.

## 2017-12-20 NOTE — PLAN OF CARE
Problem: Seizure Disorder/Epilepsy (Adult)  Goal: Signs and Symptoms of Listed Potential Problems Will be Absent, Minimized or Managed (Seizure Disorder/Epilepsy)  Signs and symptoms of listed potential problems will be absent, minimized or managed by discharge/transition of care (reference Seizure Disorder/Epilepsy (Adult) CPG).   Time/length of seizure event: 2130; lasted 5 minutes   Movements (head, eyes, extremities): Bilateral arm twitch/tremor, staring to the right.   Orientation during seizure: N/A  After seizure, remembers the unique phrase given during seizure: Yes  After seizure, remembers an unnamed visual object shown during seizure: No  Able to identify/name aloud item during seizure: Yes  Able to follow command during seizure: Yes  Able to read test sentence aloud during seizure: Yes  Able to read test sentence aloud again after the seizure: Yes  After seizure, remembers name of object shown during seizure: Yes  Orientation and level of consciousness after seizure: A&Ox4  VS and oxygen saturation during/after seizure: VSS except tachycardic  Recall of the event?: Yes  Was this a typical seizure/event?: Yes  Presence of aura or pre-seizure activity: No   Incontinence: No

## 2017-12-20 NOTE — PLAN OF CARE
Problem: Patient Care Overview  Goal: Plan of Care/Patient Progress Review  Outcome: No Change    VSS except tachy at times.  Denied pain.  Neuros intact except intermittent BUE tremors.  Denied blurred vision.  VEEG leads in place; no events witnessed or reported.  PIV SL.  On regular diet with no citrus; poor appetite.  Nausea present; declined interventions.  Denies issues with bladder.  Reports last BM 5-6 days ago, reports slight constipation.  Pt aware PRN PTA Lactulose available when she wants it.  Up with SBA and GB.  Pt to be up in harness BID for 60 minutes.  Continue with POC.

## 2017-12-20 NOTE — PROGRESS NOTES
"[facility] - Epilepsy Service Daily Note      Interval History:   Patient/ staff reported events of head nodding, staring spell last night and an episode of dizziness without LOC while she was up in harness today. There was no EEG changes during these events. Reports pain in her left side of neck from an oral lesion (Bechet's). Otherwise doing well.    Review of System:   Denies nausea, vomitting, headaches, dizziness and chest pain.     Medications:   Antiepileptic Medications Home Doses:   None    Antiepileptic Medications Current Doses:   None    Exam: Blood pressure 119/80, temperature 98  F (36.7  C), temperature source Axillary, resp. rate 16, height 1.6 m (5' 3\"), weight 68.9 kg (152 lb), last menstrual period 2017, SpO2 100 %, not currently breastfeeding.  General appearance: No acute distress. Awake, alert and oriented x 4. Boyfriend at bedside.   Neuro: Pupils are equal, round, and reactive to light, face symmetric, no pronator drift, equal  strength, normal to light touch with no sensory deficits noted, finger to nose normal, no focal deficits noted.    Video EE2017: Pending.     Assessment/ Plan: Patient seen and discussed with attending physician Dr. Kim.   1. 22 year old right-handed female with pmh of Behcet's who presents for evaluation of spells. She had episodes of staring, dizziness without LOC and head nodding without any EEG correlate. So these are not epileptic.     - Continue video EGG monitoring  - Seizure precautions  - SCDs while in bed for DVT prevention  - No medication changes today  - Up in harness twice daily  - Sleep deprive tonight. Awake till 0400 am on 2017. Then sleep from 0400- 0600 am. Then awake till 1000 pm .     2. Behcet's: Diagnosed in  by positive pathergy test with constellation of oral and genital ulcers, folliculitis, and inflammatory arthralgia.  Unclear that this is the cause of her GI symptoms, as " colonoscopy has been negative. Reports ocular symptoms, but no uveitis, per ophthalmology. Rheumatology consulted for oral ulcer. Appreciate their input.   1. Continue home meds: Colchicine 1.2-0.6  2. Cont apremilast 30 BID  3. Home trancinolone cream not continued because patient reports not using it regularly.            4.           US neck- to be ordered by rheumatology    Discharge planning:  Type of target event identified: event with no loss of awareness, staring spell with altered awareness and event with alteration in awareness - partial motor component   Number of events: more needed (atleast one of each spells)  Discharge medication plan: To be determined   Further Imaging studies needed prior to discharge: No imaging required prior to discharge  Discharge transportation: Family to provide  Other pertinent issues/goals for discharge: None    Total time: 15 minute was spent in the care of this patient. The patient agrees with the above mentioned plan of care. I answered all the patient's questions and addressed immediate concerns. More than 50% of time spent consisted of counseling and coordinating care, including discussion of the diagnostic significance of EEG findings, anti-seizure medication management, and planning for discharge home.     Maria Del Carmen Scott, CNP  Neurology

## 2017-12-20 NOTE — CONSULTS
[facility] Rheumatology Consultation    Samara Oropeza MRN# [id number]   Age: 22 year old YOB: 1995     Date of Admission:  12/19/2017    Reason for consult: Possible Behcet's flare       Requesting physician: FATEMEH Michael       Level of consult: Consult, follow and place orders           Assessment and Plan:      # Oral lesions  # Behcet's disease  Subcentimeter, tender, nonbleeding, nonpurulent lesion just lateral to lower right second molar, has notable submandibular swelling and neck pain/tenderness, but no issues with swallowing.  Concern for submandibular abscess vs reactive lymphadenopathy at this time. Reports daily subjective fevers as her baseline. Has used low dose prednisone in the past for her ulcer flares. Follows with Dr. Marquez in clinic, last seen in 10/2017 and recommended HLA-B27 testing then, has follow up in 1/2018. Currently on Otezla and colchicine. Had triamcinolone cream at home which has not helped with the pain. She denies any eye issues, currently at baseline arthralgias, no genital ulcers.  - recommend U/S of left submandibular region, rule out underlying abscess (ordered)  - if no abscess present, then will treat as Behcet's flare with low dose prednisone  - lidocaine PO solution for pain  - magic mouth wash Q6H PRN  - will follow up with Dr. Marquez in 1/2018            Chief Complaint:   Behcet's disease     History is obtained from the patient         History of Present Illness (Resident / Clinician):   This patient is a 22 year old -American female with a significant past medical history of Behcet's disease, fibromyalgia, GERD, gastroparesis, IBS, migraine, and Tourette's who was electively admitted on 12/19 with concerns for convulsive spells. Rheumatology was consulted for management of a Behcet's flare, specifically an oral lesion associated with neck pain. She noted that this pain has progressed since last Thursday, does not  feel lesion has not gotten any bigger. She notes that her Behcet's disease has been controlled with her current medications, denies any current issues with genital ulcers or rashes. She denies any bleeding or pus draining from the ulcer.    She notes that she had three seizures yesterday, has not had any today and continues with vEEG monitoring.           Past Medical History:     Past Medical History:   Diagnosis Date     Anxiety      Arthritis      Behcet's disease (H)      Cervical adenitis May 2010     Chronic abdominal pain      Constipation, chronic 1994     Gastro-oesophageal reflux disease      Gastroparesis      Migraines      Neuromuscular disorder (H)     fibramyalgia     Palpitations      Seizure (H)      Seizures (H)     unknown etiology     Syncope      Tourette's              Past Surgical History:     Past Surgical History:   Procedure Laterality Date     ARTHROSCOPY KNEE WITH PATELLAR REALIGNMENT  7/25/2013    Procedure: ARTHROSCOPY KNEE WITH PATELLAR REALIGNMENT;  Left Knee Arthroscopy, Medial Patellofemoral Ligament Reconstruction with Allograft  ;  Surgeon: Jennifer Acevedo MD;  [facility]     COLONOSCOPY  2015     DENTAL SURGERY  1996    Teeth removal     ENDOSCOPY UPPER, COLONOSCOPY, COMBINED  2005      ESOPH/GAS REFLUX TEST W NASAL IMPED >1 HR N/A 2/15/2017    Procedure: ESOPHAGEAL IMPEDENCE FUNCTION TEST WITH 24 HOUR PH GREATER THAN 1 HOUR;  Surgeon: Timothy Matta MD;  Location:  GI             Social History:     Social History   Substance Use Topics     Smoking status: Never Smoker     Smokeless tobacco: Never Used     Alcohol use 0.6 oz/week     1 Standard drinks or equivalent per week      Comment: MONTHLY             Family History:     Family History   Problem Relation Age of Onset     Depression Mother      Neurologic Disorder Mother      Migraines, take imitrex injection.  Also in maternal grandmother.       Alcohol/Drug Father      Hypertension Father       Depression Father      Cardiovascular Maternal Grandmother      Depression Maternal Grandmother      Hypertension Maternal Grandmother      Alzheimer Disease Maternal Grandmother      Cardiovascular Maternal Grandfather      Hypertension Maternal Grandfather      Depression Maternal Grandfather      Alcohol/Drug Maternal Grandfather      Cardiovascular Paternal Grandmother      Hypertension Paternal Grandmother      Cardiovascular Paternal Grandfather      Hypertension Paternal Grandfather      Glaucoma No family hx of      Macular Degeneration No family hx of    No history of Behcet's in the family.          Immunizations:     Immunization History   Administered Date(s) Administered     DTAP (<7y) 1994, 02/22/1995, 04/21/1995, 04/15/1996, 10/12/1999     HEPA 10/05/2012     HPV 10/03/2007, 01/10/2008, 04/14/2008     HepB 04/21/1995, 05/17/1995, 11/07/1995     Influenza (IIV3) PF 10/05/2012, 10/30/2015     Influenza Vaccine IM 3yrs+ 4 Valent IIV4 10/24/2017     MMR 04/15/1996, 02/16/2000     Meningococcal (Menactra ) 10/03/2007     Poliovirus, inactivated (IPV) 1994, 02/22/1995, 04/21/1995, 02/16/2000     TDAP Vaccine (Adacel) 10/24/2017     TDAP Vaccine (Boostrix) 10/03/2007     Varicella 10/03/2007, 08/11/2009             Allergies:     Allergies   Allergen Reactions     Amoxil [Penicillins] Rash     Dad unsure of reaction.     Bee Venom Anaphylaxis     Contrast Dye Rash     Contrast Media Ready-Box [facility] [place] [facility], 04/09/2014.; Contrast Media Ready-Box [facility] [place] [facility], 04/09/2014.  NOTE: this is a contrast media oral with iodine. Premedicate with methylpred standard for IV contrast, request barium contrast for oral contrast.     Kiwi Swelling     Orange Fruit [Citrus] Anaphylaxis     Pineapple Anaphylaxis, Difficulty breathing and Rash     Reglan [Metoclopramide] Other (See Comments)     IV dose only, in ER, rapid heart rate.     Ace Inhibitors      Difficulty in breathing and GI upset     Amitiza [Lubiprostone] Nausea and  Vomiting     Amoxicillin-Pot Clavulanate      Latex      Midazolam Unknown     Other reaction(s): Unknown  parent states that when pt takes this medication, she wakes up being very violent .  parent states that when pt takes this medication, she wakes up being very violent .     Versed      Coming out of pelvic exam at age of 5, was kicking and screaming when coming out of the versed.     Adhesive Tape Rash     Azithromycin Hives and Rash     Cephalexin Itching and Rash     Itchy mouth  Itchy mouth     Keflex [Cephalexin-Fd&C Yellow #6] Hives             Medications:     Current Facility-Administered Medications   Medication     EPINEPHrine (ADRENALIN) kit 0.3 mg     albuterol (PROAIR HFA/PROVENTIL HFA/VENTOLIN HFA) Inhaler 2 puff     amitriptyline (ELAVIL) tablet 50 mg     apremilast (OTEZLA) tablet 30 mg     aspirin chewable tablet 81 mg     aspirin-acetaminophen-caffeine (EXCEDRIN MIGRAINE) per tablet 1 tablet     colchicine tablet 1.2 mg     dicyclomine (BENTYL) tablet 20 mg     diphenhydrAMINE (BENADRYL) capsule 25 mg     hydrOXYzine (ATARAX) tablet 25-50 mg     hyoscyamine (ANASPAZ/LEVSIN) tablet 125-250 mcg     linaclotide (LINZESS) capsule 290 mcg     omeprazole (priLOSEC) CR capsule 40 mg     ondansetron (ZOFRAN-ODT) ODT tab 8 mg     norgestimate-ethinyl estradiol (ORTHO-CYCLEN, SPRINTEC) 0.25-35 MG-MCG per tablet 1 tablet     sucralfate (CARAFATE) suspension 1 g     colchicine tablet 0.6 mg     lidocaine 1 % 1 mL     lidocaine (LMX4) kit     sodium chloride (PF) 0.9% PF flush 3 mL     sodium chloride (PF) 0.9% PF flush 3 mL     magnesium hydroxide (MILK OF MAGNESIA) suspension 30 mL     lidocaine (viscous) (XYLOCAINE) 2 % solution 5 mL     bacitracin ointment     LORazepam (ATIVAN) injection 2 mg     magic mouthwash suspension (diphenhydramine, lidocaine, aluminum-magnesium & simethicone)     guanFACINE (TENEX) tablet 3 mg     GENTEAL TEARS 0.1-0.3 % SOLN 1 drop     lactulose (CHRONULAC) solution 20 g      Lactose Defense Formula (with probiotic) capsule 2 capsule     prochlorperazine (COMPAZINE) tablet 10 mg             Review of Systems:   The Review of Systems is negative other than noted in the HPI          Physical Exam (Resident / Clinician):   Vitals were reviewed  Temp: 98  F (36.7  C) Temp src: Axillary BP: 119/80   Heart Rate: 103 Resp: 16 SpO2: 100 % O2 Device: None (Room air)      Constitutional:   awake, alert, cooperative, no apparent distress, and appears stated age   Eyes:   Lids and lashes normal, pupils equal, round and reactive to light, extra ocular muscles intact, sclera clear, conjunctiva normal   ENT:   Normocephalic, without obvious abnormality, atraumatic, sinuses nontender on palpation, external ears without lesions, sub-centimeter umbilicated lesion lateral to lower right second molar, oral pharynx with moist mucous membranes, tonsils without erythema or exudates, gums normal and good dentition.   Neck:   Right submandibular tenderness, supple, symmetrical, trachea midline, not enlarged and no tenderness, skin normal   Hematologic / Lymphatic:   no cervical lymphadenopathy   Musculoskeletal:   There is no redness, warmth, or swelling of joints.  Full range of motion noted.   Skin:   no bruising or bleeding, no rashes and no jaundice           Data:   All laboratory and imaging data in the past 24 hours reviewed  Lab Results   Component Value Date    WBC 5.4 11/03/2017    HGB 13.4 11/03/2017    HCT 40.7 11/03/2017     11/03/2017     11/03/2017    POTASSIUM 3.8 11/03/2017    CHLORIDE 106 11/03/2017    CO2 27 11/03/2017    BUN 10 11/03/2017    CR 0.78 11/03/2017    GLC 82 11/03/2017    SED 4 11/06/2016    DD 0.7 (H) 11/16/2016    TROPONIN 0.00 03/29/2015    TROPI  11/06/2016     <0.015  The 99th percentile for upper reference range is 0.045 ug/L.  Troponin values in   the range of 0.045 - 0.120 ug/L may be associated with risks of adverse   clinical events.      AST 34 11/03/2017     ALT 40 11/03/2017    GGT 34 (H) 10/19/2013    ALKPHOS 99 11/03/2017    BILITOTAL 0.3 11/03/2017    INR 0.99 11/06/2016     Attestation:  I have reviewed today's vital signs, notes, medications, labs and imaging  Stevie David MD   Internal Medicine PGY-1    Patient was seen and discussed with staff, Dr. Pinon.

## 2017-12-21 ENCOUNTER — ALLIED HEALTH/NURSE VISIT (OUTPATIENT)
Dept: NEUROLOGY | Facility: CLINIC | Age: 22
DRG: 101 | End: 2017-12-21
Attending: PSYCHIATRY & NEUROLOGY
Payer: COMMERCIAL

## 2017-12-21 ENCOUNTER — APPOINTMENT (OUTPATIENT)
Dept: ULTRASOUND IMAGING | Facility: CLINIC | Age: 22
DRG: 101 | End: 2017-12-21
Attending: PSYCHIATRY & NEUROLOGY
Payer: COMMERCIAL

## 2017-12-21 DIAGNOSIS — R56.9 CONVULSIONS, UNSPECIFIED CONVULSION TYPE (H): Primary | ICD-10-CM

## 2017-12-21 LAB
BASOPHILS # BLD AUTO: 0 10E9/L (ref 0–0.2)
BASOPHILS NFR BLD AUTO: 0.3 %
CRP SERPL-MCNC: 12 MG/L (ref 0–8)
DIFFERENTIAL METHOD BLD: ABNORMAL
EOSINOPHIL # BLD AUTO: 0.1 10E9/L (ref 0–0.7)
EOSINOPHIL NFR BLD AUTO: 3.6 %
ERYTHROCYTE [DISTWIDTH] IN BLOOD BY AUTOMATED COUNT: 13.1 % (ref 10–15)
ERYTHROCYTE [SEDIMENTATION RATE] IN BLOOD BY WESTERGREN METHOD: 13 MM/H (ref 0–20)
HCT VFR BLD AUTO: 37.7 % (ref 35–47)
HGB BLD-MCNC: 12.5 G/DL (ref 11.7–15.7)
IMM GRANULOCYTES # BLD: 0 10E9/L (ref 0–0.4)
IMM GRANULOCYTES NFR BLD: 0 %
LYMPHOCYTES # BLD AUTO: 2.1 10E9/L (ref 0.8–5.3)
LYMPHOCYTES NFR BLD AUTO: 55.6 %
MCH RBC QN AUTO: 27.8 PG (ref 26.5–33)
MCHC RBC AUTO-ENTMCNC: 33.2 G/DL (ref 31.5–36.5)
MCV RBC AUTO: 84 FL (ref 78–100)
MONOCYTES # BLD AUTO: 0.2 10E9/L (ref 0–1.3)
MONOCYTES NFR BLD AUTO: 3.9 %
NEUTROPHILS # BLD AUTO: 1.4 10E9/L (ref 1.6–8.3)
NEUTROPHILS NFR BLD AUTO: 36.6 %
NRBC # BLD AUTO: 0 10*3/UL
NRBC BLD AUTO-RTO: 0 /100
PLATELET # BLD AUTO: 272 10E9/L (ref 150–450)
PROCALCITONIN SERPL-MCNC: <0.05 NG/ML
RBC # BLD AUTO: 4.5 10E12/L (ref 3.8–5.2)
WBC # BLD AUTO: 3.9 10E9/L (ref 4–11)

## 2017-12-21 PROCEDURE — 85652 RBC SED RATE AUTOMATED: CPT | Performed by: STUDENT IN AN ORGANIZED HEALTH CARE EDUCATION/TRAINING PROGRAM

## 2017-12-21 PROCEDURE — 85025 COMPLETE CBC W/AUTO DIFF WBC: CPT | Performed by: STUDENT IN AN ORGANIZED HEALTH CARE EDUCATION/TRAINING PROGRAM

## 2017-12-21 PROCEDURE — 25000132 ZZH RX MED GY IP 250 OP 250 PS 637: Performed by: STUDENT IN AN ORGANIZED HEALTH CARE EDUCATION/TRAINING PROGRAM

## 2017-12-21 PROCEDURE — 25000132 ZZH RX MED GY IP 250 OP 250 PS 637: Performed by: PHYSICIAN ASSISTANT

## 2017-12-21 PROCEDURE — 86140 C-REACTIVE PROTEIN: CPT | Performed by: STUDENT IN AN ORGANIZED HEALTH CARE EDUCATION/TRAINING PROGRAM

## 2017-12-21 PROCEDURE — 76536 US EXAM OF HEAD AND NECK: CPT

## 2017-12-21 PROCEDURE — 12000001 ZZH R&B MED SURG/OB UMMC

## 2017-12-21 PROCEDURE — 95951 ZZHC EEG VIDEO EACH 24 HR: CPT | Mod: ZF

## 2017-12-21 PROCEDURE — 36415 COLL VENOUS BLD VENIPUNCTURE: CPT | Performed by: STUDENT IN AN ORGANIZED HEALTH CARE EDUCATION/TRAINING PROGRAM

## 2017-12-21 PROCEDURE — 84145 PROCALCITONIN (PCT): CPT | Performed by: STUDENT IN AN ORGANIZED HEALTH CARE EDUCATION/TRAINING PROGRAM

## 2017-12-21 PROCEDURE — 25000125 ZZHC RX 250: Performed by: PHYSICIAN ASSISTANT

## 2017-12-21 RX ORDER — CLINDAMYCIN HCL 300 MG
300 CAPSULE ORAL EVERY 8 HOURS SCHEDULED
Status: DISCONTINUED | OUTPATIENT
Start: 2017-12-21 | End: 2017-12-22 | Stop reason: HOSPADM

## 2017-12-21 RX ADMIN — LIDOCAINE HYDROCHLORIDE 5 ML: 20 SOLUTION ORAL; TOPICAL at 21:52

## 2017-12-21 RX ADMIN — COLCHICINE 1.2 MG: 0.6 TABLET, FILM COATED ORAL at 08:19

## 2017-12-21 RX ADMIN — COLCHICINE 0.6 MG: 0.6 TABLET, FILM COATED ORAL at 21:41

## 2017-12-21 RX ADMIN — ACETAMINOPHEN, ASPIRIN AND CAFFEINE 1 TABLET: 250; 250; 65 TABLET, FILM COATED ORAL at 08:26

## 2017-12-21 RX ADMIN — GUANFACINE HYDROCHLORIDE 3 MG: 1 TABLET ORAL at 08:25

## 2017-12-21 RX ADMIN — GUANFACINE HYDROCHLORIDE 3 MG: 1 TABLET ORAL at 20:56

## 2017-12-21 RX ADMIN — OMEPRAZOLE 40 MG: 20 CAPSULE, DELAYED RELEASE ORAL at 08:19

## 2017-12-21 RX ADMIN — SUCRALFATE 1 G: 1 SUSPENSION ORAL at 15:57

## 2017-12-21 RX ADMIN — ONDANSETRON 8 MG: 4 TABLET, ORALLY DISINTEGRATING ORAL at 21:12

## 2017-12-21 RX ADMIN — SUCRALFATE 1 G: 1 SUSPENSION ORAL at 20:55

## 2017-12-21 RX ADMIN — HYOSCYAMINE SULFATE 250 MCG: 0.12 TABLET ORAL at 15:57

## 2017-12-21 RX ADMIN — CLINDAMYCIN HYDROCHLORIDE 300 MG: 300 CAPSULE ORAL at 20:55

## 2017-12-21 RX ADMIN — HYOSCYAMINE SULFATE 250 MCG: 0.12 TABLET ORAL at 10:19

## 2017-12-21 RX ADMIN — AMITRIPTYLINE HYDROCHLORIDE 50 MG: 50 TABLET, FILM COATED ORAL at 21:41

## 2017-12-21 RX ADMIN — ASPIRIN 81 MG CHEWABLE TABLET 81 MG: 81 TABLET CHEWABLE at 00:48

## 2017-12-21 RX ADMIN — HYOSCYAMINE SULFATE 250 MCG: 0.12 TABLET ORAL at 21:41

## 2017-12-21 RX ADMIN — NORGESTIMATE AND ETHINYL ESTRADIOL 1 TABLET: KIT at 21:41

## 2017-12-21 RX ADMIN — SUCRALFATE 1 G: 1 SUSPENSION ORAL at 13:06

## 2017-12-21 RX ADMIN — SUCRALFATE 1 G: 1 SUSPENSION ORAL at 08:19

## 2017-12-21 ASSESSMENT — PAIN DESCRIPTION - DESCRIPTORS
DESCRIPTORS: CRAMPING
DESCRIPTORS: ACHING

## 2017-12-21 ASSESSMENT — VISUAL ACUITY
OU: NORMAL ACUITY;GLASSES
OU: NORMAL ACUITY;GLASSES

## 2017-12-21 NOTE — PLAN OF CARE
Problem: Seizure Disorder/Epilepsy (Adult)  Goal: Signs and Symptoms of Listed Potential Problems Will be Absent, Minimized or Managed (Seizure Disorder/Epilepsy)  Signs and symptoms of listed potential problems will be absent, minimized or managed by discharge/transition of care (reference Seizure Disorder/Epilepsy (Adult) CPG).   Pt here for seizure monitoring. VSS except tachy at times. A&O x4. Neuro intact except blurry vision at times and BUE tremors. Denies pain. VEEG leads in place; no events. PIV SL. Reg diet; poor intake. Voiding spontaneously. Hx of drop seizures. Sleep deprive until 4AM. Pt needs US of head/neck. Boyfriend in room and very supportive. Continue to monitor and follow current POC.

## 2017-12-21 NOTE — PLAN OF CARE
Problem: Patient Care Overview  Goal: Plan of Care/Patient Progress Review  Outcome: No Change    VSS except tachy at times. Reported pain in left mouth; took PRN ASA x 1 with some relief.    Neuros intact except intermittent BUE tremors.  Denied blurred vision.  VEEG leads in place; no events witnessed or reported.  Pt sleep deprived overnight with nap from 2961-1145.  Fell asleep few times between 0000 and 0400.  PIV SL.  On regular diet with no citrus; poor appetite.  Nausea present; declined interventions.  Denies issues with bladder.  Reports last BM 6-7 days ago, reports slight constipation.  Pt aware PRN PTA Lactulose available when she wants it.  Up with SBA and GB.  Pt to be up in harness BID for 60 minutes.  Plan is for head/neck soft tissue US.  Continue with POC.

## 2017-12-21 NOTE — PLAN OF CARE
Problem: Seizure Disorder/Epilepsy (Adult)  Goal: Signs and Symptoms of Listed Potential Problems Will be Absent, Minimized or Managed (Seizure Disorder/Epilepsy)  Signs and symptoms of listed potential problems will be absent, minimized or managed by discharge/transition of care (reference Seizure Disorder/Epilepsy (Adult) CPG).   Outcome: No Change  VSS. C/O mouth pain, declined intervention. Excedrin given for headache with good results. Neuros unchanged; blurry vision at times, BUE tremors. Poor po intake. Voiding spontaneously. Unsure of last bm, it was been 4-5 days (declined bowel meds). Up with SBA. Pt needs to stay awake until 2200.

## 2017-12-21 NOTE — PROGRESS NOTES
"[facility]: [place]  EPILEPSY SERVICE PROGRESS NOTE  DOS: 12/21/2017    SUBJECTIVE  Patient reports she had one episode yesterday, while she was ambulating. It was similar to other spells she has had before in that her legs got numb and \"prickly\" a few minutes before she was overcome by feeling disoriented. She does not believe she lost awareness during the spell, but feels she cannot recall many details of the event.    Patient was sleep deprived overnight - did her best to stay awake, but had some trouble and napped a bit on and off.    Reports ongoing pain in her left mouth/cheek and neck. Underwent US eval for that this AM.      OBJECTIVE  /61 (BP Location: Right arm)  Pulse 78  Temp 96.8  F (36  C) (Oral)  Resp 16  Ht 1.6 m (5' 3\")  Wt 68.9 kg (152 lb)  LMP 11/28/2017 (Exact Date)  SpO2 98%  BMI 26.93 kg/m2  GEN Cooperative. NAD.  HEENT Sclerae anicteric, MMM. No nuchal rigidity. White lesion just lateral to the lower left molars.   CVS Peripheral Pulse palpable. RRR. No MRG.  PULM No respiratory distress. CTAB.  MSK ROM intact. No joint swelling.  GI Mild tenderness to palpation. No palpable masses. Bowel sounds active.  PSYC Affect flat.  NEURO   MS Appropriate in alertness, attention, orientation. No aphasia.   CN Sees double in the left lower quadrant of the left eye only. Otherwise visual fields intact. PERRL. EOMI with normal smooth pursuit. Facial movements symmetric. Hearing intact to conversation. Palate elevation symmetric, uvula midline. Tongue protrusion midline.  No dysarthria   MTR No involuntary movements observed. Normal tone. No drift.   Strength 5/5 in upper and lower extremities, proximally and distally.   SNS Symmetric to light touch in the face, arm, and leg.    RFLX 2+ and symmetric in biceps, brach.   CRD FNF intact.   GAIT Deferred    Recent labs and imaging reviewed and used in formulating the plan.      ASSESSMENT/PLAN  Summary: Samara" "Sandip is a 22 year old female with history of Behcet's, who was admitted 12/19/17 for evaluation of multiple spell types. Multiple spell types captured so far of both staring and one falling (while in harness) - none of which have had associated EEG change. Sleep deprived 12/20/17.     # Spells  Patient has had staring spells and falling spells, both of which had no associated EEG change. Larger convulsive spells with loss of awareness have not been seen yet.  --Falling spells without convulsion may be cardiac - would be prudent to obtain tilt table test to evaluate.  1. VEEG  2. Patient is on no scheduled anticonvulsants since before admission  3. Will hold patient's home oral lorazepam (0.5mg PRN for nausea) - patient agreeable to this  4. 2mg IV lorazepam PRN for GTC or for 2 partial seizures in 2 hours  5. EP consult for tilt table test    --Staring spells appear to be psychogenic in nature. Discussed with the patient today that there are many treatment options for psychogenic non-epileptic spells (\"VEE\")  1. Dr. Bangura in [place] has an 8 week treatment program specifically for VEE, where they go through the \"Taking Control of Your Seizures\" workbook by Dr. Miller  2. Close follow-up with her primary psychiatrist, Dr. Bangura, is important. We will attempt to communicate with her     # Behcet's  Diagnosed in 2014 by positive pathergy test with constellation of oral and genital ulcers, folliculitis, and inflammatory arthralgia.  Unclear that this is the cause of her GI symptoms, as colonoscopy has been negative. Reports ocular symptoms, but no uveitis, per ophthalmology. Follows with rheumatology, who reassessed in the hospital for painful oral sore - obtained US, which ruled out underlying abscess.  1. Continue home meds: Colchicine 1.2-0.6. Apremilast 30 BID  2. Home trancinolone cream not continued because patient reports not using it regularly.  3. Lidocaine and magic mouthwash PRN pain  4. Low dose " prednisone - dose and duration to be determined by rheumatology.     # Irritable Bowel Syndrome  Constipation predominant. Follows with GI. Also recently seen by gen surgery out of concern for possible malrotation or hypermobile cecum - considering laparoscopic lap band.  1. Continue home omeprazole 40 qd  2. Continue sucralfate 1g QID  3. Continue linaclotide 290 QOD, and lactulose PRN for constipation  4. Continue lactase PRN with dairy  5. Continue zofran PRN nausea  6. Continue dicyclomine PRN irritable bowels  7. Continue Hyoscyamine PRN GI pain  8. Holding home ativan, which patient reports taking for nausea less than 1x/week     # Tourettes  Receives botox for tourettes and cervical dystonia.  1. Continue home guanfacine 3mg BID     # Chronic pain syndrome: Continue home amitriptyline 50mg qhs    # Generalized anxiety disorder: Continue home vistaril 25-50 qid PRN     # Ppx: SCDs. Ambulate TID  # FEN: Regular diet - NO CITRUS (anaphylaxis)  # Code: Full    Disposition Plan   Expected discharge in 1 day to prior living arrangement, ideally after one larger convulsive spell captured.     Entered: Cornell Lezama 12/21/2017, 3:48 PM     Patient was seen and discussed with Epilepsy staff, Dr. Kim, who agrees with the plan.  Cornell Lezama MD  Epilepsy Fellow

## 2017-12-21 NOTE — MR AVS SNAPSHOT
After Visit Summary   12/21/2017    Samara Oropeza    MRN: [id number]           Patient Information     Date Of Birth          1995        Visit Information        Provider Department      12/21/2017 7:00 AM [facility] TECH 4 P EEG        Today's Diagnoses     Convulsions, unspecified convulsion type (H)    -  1       Follow-ups after your visit        Your next 10 appointments already scheduled     Dec 22, 2017  7:00 AM CST   24 Hour Video Visit with [facility] TECH 4   P EEG ([facility])    [facility]  [address] [address]   [phone]           [place]: Your appointment is scheduled at [facility]. [address] [place] [address]            Dec 26, 2017 11:30 AM CST   Telephone Call with [facility] Nurse 2   [facility] ([facility])    [address] [place] [address], [address]  [facility] [id number] [phone]           Note: this is not an onsite visit; there is no need to come to the facility.            Jan 03, 2018  1:00 PM CST   Colonoscopy with Arian Rollins MD   [facility] ([facility])    [address]  [address]  [facility] [id number] [phone]            Jan 16, 2018 11:30 AM CST   Return Visit with Austen Marquez MD   [facility] ([facility])    [address] [id number] [phone]            Jan 17, 2018  2:20 PM CST   (Arrive by 2:05 PM)   New Patient Visit with Estela Cuenca MD   [facility] ([facility])    [address]  4th Floor  [facility] [id number] [phone]            Jan 24, 2018  2:00 PM CST   (Arrive by 1:45 PM)   Return Visit with EVI Vizcaino CNP   [facility] ([facility])     [address]  [address] [facility] [address]   [phone]            Feb 13, 2018  2:30 PM CST   (Arrive by 2:15 PM)   Return Seizure with Sigifredo Flores MD   [facility] ([facility])    [address]  [address]rd [facility] [id number] [phone]            Feb 27, 2018 10:15 AM CST   Return Visit with Cata Hurst NP   [facility] ([facility])    [address]  [address]  [facility] [id number] [phone]            Feb 28, 2018  3:00 PM CST   (Arrive by 2:45 PM)   Return Botox with Frederick Bender MD   [facility] ([facility])    [address] [place] [id number] [phone]              Who to contact     Please call your clinic at [phone] to:    Ask questions about your health    Make or cancel appointments    Discuss your medicines    Learn about your test results    Speak to your doctor   If you have compliments or concerns about an experience at your clinic, or if you wish to file a complaint, please contact [facility] Patient Relations at [phone] or email us at [email or web address]         Additional Information About Your Visit        MyChart Information     [employer] gives you secure access to your electronic health record. If you see a primary care provider, you can also send messages to your care team and make appointments. If you have questions, please call your primary care clinic.  If you do not have a primary care provider, please call [phone] and they will assist you.      [employer] is an electronic gateway that provides easy, online access to your medical records. With [employer], you can request a clinic appointment, read your test results, renew a prescription or communicate with your care team.     To access your existing account, please  contact your [facility] or call [phone] for assistance.        Care EveryWhere ID     This is your Care EveryWhere ID. This could be used by other organizations to access your [facility] medical records  [id number]        Your Vitals Were     Last Period                   11/28/2017 (Exact Date)            Blood Pressure from Last 3 Encounters:   12/21/17 102/61   12/18/17 131/76   12/12/17 110/64    Weight from Last 3 Encounters:   12/20/17 68.9 kg (152 lb)   12/18/17 69.3 kg (152 lb 11.2 oz)   12/12/17 70.8 kg (156 lb)              Today, you had the following     No orders found for display         Today's Medication Changes      Notice     This visit is during an admission. Changes to the med list made in this visit will be reflected in the After Visit Summary of the admission.             Primary Care Provider Office Phone # Fax #    Sonja EVI [facility] [phone]       [address] [facility] [address]  [facility] [address]        Equal Access to Services     [facility]: Hadii aad ku hadasho Soomaali, waaxda luqadaha, qaybta kaalmada adeegyada, waxjarred salinas hayarlet rodriguez . So [facility] [phone].    ATENCIÓN: Si habla español, tiene a livingston disposición servicios gratuitos de asistencia lingüística. Llame al [phone].    We comply with applicable federal civil rights laws and [place] laws. We do not discriminate on the basis of race, color, national origin, age, disability, sex, sexual orientation, or gender identity.            Thank you!     Thank you for choosing [facility]  for your care. Our goal is always to provide you with excellent care. Hearing back from our patients is one way we can continue to improve our services. Please take a few minutes to complete the written survey that you may receive in the mail after your visit with us. Thank you!             Your Updated Medication List - Protect others around you: Learn how to safely use,  store and throw away your medicines at www.disposemymeds.org.      Notice     This visit is during an admission. Changes to the med list made in this visit will be reflected in the After Visit Summary of the admission.

## 2017-12-22 ENCOUNTER — APPOINTMENT (OUTPATIENT)
Dept: CARDIOLOGY | Facility: CLINIC | Age: 22
DRG: 101 | End: 2017-12-22
Attending: INTERNAL MEDICINE
Payer: COMMERCIAL

## 2017-12-22 ENCOUNTER — ALLIED HEALTH/NURSE VISIT (OUTPATIENT)
Dept: NEUROLOGY | Facility: CLINIC | Age: 22
DRG: 101 | End: 2017-12-22
Attending: PSYCHIATRY & NEUROLOGY
Payer: COMMERCIAL

## 2017-12-22 VITALS
HEIGHT: 63 IN | OXYGEN SATURATION: 98 % | RESPIRATION RATE: 16 BRPM | TEMPERATURE: 96.8 F | HEART RATE: 83 BPM | WEIGHT: 152 LBS | DIASTOLIC BLOOD PRESSURE: 69 MMHG | SYSTOLIC BLOOD PRESSURE: 115 MMHG | BODY MASS INDEX: 26.93 KG/M2

## 2017-12-22 DIAGNOSIS — R40.4 TRANSIENT ALTERATION OF AWARENESS: Primary | ICD-10-CM

## 2017-12-22 PROCEDURE — 25000125 ZZHC RX 250: Performed by: PHYSICIAN ASSISTANT

## 2017-12-22 PROCEDURE — 25000132 ZZH RX MED GY IP 250 OP 250 PS 637: Performed by: NURSE PRACTITIONER

## 2017-12-22 PROCEDURE — 95951 ZZHC EEG VIDEO EACH 24 HR: CPT | Mod: ZF

## 2017-12-22 PROCEDURE — 25000132 ZZH RX MED GY IP 250 OP 250 PS 637: Performed by: PHYSICIAN ASSISTANT

## 2017-12-22 PROCEDURE — 25000132 ZZH RX MED GY IP 250 OP 250 PS 637: Performed by: STUDENT IN AN ORGANIZED HEALTH CARE EDUCATION/TRAINING PROGRAM

## 2017-12-22 PROCEDURE — 93660 TILT TABLE EVALUATION: CPT

## 2017-12-22 PROCEDURE — 93660 TILT TABLE EVALUATION: CPT | Mod: 26 | Performed by: INTERNAL MEDICINE

## 2017-12-22 PROCEDURE — 95921 AUTONOMIC NRV PARASYM INERVJ: CPT | Mod: 26 | Performed by: INTERNAL MEDICINE

## 2017-12-22 PROCEDURE — 93010 ELECTROCARDIOGRAM REPORT: CPT | Mod: 59 | Performed by: INTERNAL MEDICINE

## 2017-12-22 PROCEDURE — 95921 AUTONOMIC NRV PARASYM INERVJ: CPT

## 2017-12-22 PROCEDURE — 93005 ELECTROCARDIOGRAM TRACING: CPT

## 2017-12-22 RX ORDER — SODIUM CHLORIDE 9 MG/ML
INJECTION, SOLUTION INTRAVENOUS CONTINUOUS
Status: DISCONTINUED | OUTPATIENT
Start: 2017-12-22 | End: 2017-12-22

## 2017-12-22 RX ORDER — LIDOCAINE 40 MG/G
CREAM TOPICAL
Status: DISCONTINUED | OUTPATIENT
Start: 2017-12-22 | End: 2017-12-22 | Stop reason: HOSPADM

## 2017-12-22 RX ORDER — CLINDAMYCIN HCL 300 MG
300 CAPSULE ORAL 3 TIMES DAILY
Qty: 28 CAPSULE | Refills: 0 | Status: SHIPPED | OUTPATIENT
Start: 2017-12-22 | End: 2018-01-10

## 2017-12-22 RX ORDER — NITROGLYCERIN 0.4 MG/1
0.4 TABLET SUBLINGUAL ONCE
Status: COMPLETED | OUTPATIENT
Start: 2017-12-22 | End: 2017-12-22

## 2017-12-22 RX ORDER — CLINDAMYCIN HCL 300 MG
300 CAPSULE ORAL 3 TIMES DAILY
Qty: 28 CAPSULE | Refills: 0 | Status: SHIPPED | OUTPATIENT
Start: 2017-12-22 | End: 2017-12-22

## 2017-12-22 RX ORDER — SODIUM CHLORIDE 9 MG/ML
INJECTION, SOLUTION INTRAVENOUS CONTINUOUS
Status: DISCONTINUED | OUTPATIENT
Start: 2017-12-22 | End: 2017-12-22 | Stop reason: HOSPADM

## 2017-12-22 RX ADMIN — COLCHICINE 1.2 MG: 0.6 TABLET, FILM COATED ORAL at 12:17

## 2017-12-22 RX ADMIN — GUANFACINE HYDROCHLORIDE 3 MG: 1 TABLET ORAL at 12:16

## 2017-12-22 RX ADMIN — CLINDAMYCIN HYDROCHLORIDE 300 MG: 300 CAPSULE ORAL at 13:36

## 2017-12-22 RX ADMIN — SUCRALFATE 1 G: 1 SUSPENSION ORAL at 12:16

## 2017-12-22 RX ADMIN — NITROGLYCERIN 0.4 MG: 0.4 TABLET SUBLINGUAL at 11:33

## 2017-12-22 RX ADMIN — OMEPRAZOLE 40 MG: 20 CAPSULE, DELAYED RELEASE ORAL at 12:17

## 2017-12-22 RX ADMIN — CLINDAMYCIN HYDROCHLORIDE 300 MG: 300 CAPSULE ORAL at 06:37

## 2017-12-22 RX ADMIN — ONDANSETRON 8 MG: 4 TABLET, ORALLY DISINTEGRATING ORAL at 06:37

## 2017-12-22 RX ADMIN — ONDANSETRON 8 MG: 4 TABLET, ORALLY DISINTEGRATING ORAL at 13:36

## 2017-12-22 ASSESSMENT — VISUAL ACUITY: OU: NORMAL ACUITY;GLASSES

## 2017-12-22 NOTE — PROGRESS NOTES
[facility]  Rheumatology Daily Note          Assessment and Plan:     Problem list:  # Gingival lesion  # Hx of Behcet's  # Spells NOS    Discussion:  Ms. Oropeza is a 21 yo F with a history of Behcet's and fibromyalgia/IBS who was admitted for epilepsy evaluation. Rheumatology was consulted to assess and provide recommendations for suspected flare of Behcet's.   The lesion in her mouth would be very atypical for Behcet's in that it is not an aphthous ulcer and is located on the gum rather than typical site:  lips, buccal mucosa, floor of mouth, soft palate.   This lesion looks more like an infection and she does report a history of abscess. We would recommend a course of anti-biotics. Augmentin would be our preferred choice, but d/t PCN allergy we will use clindamycin.     Recommendations:  - check: esr, crp, cbc w/ diff, procalcitonin  - start clindamycin 300mg tid for 10 days for suspected infection  - If no improvement with clindamycin, can give short course prednisone (e.g 5 days 15mg, 5 days 10mg and stay on 5 mg till Rheumatology follow up outpatient )  - continue apremilast and colchicine    Please feel free to page with questions.     Patient was staffed with attending Dr. Zi Mendoza MD, PhD  Rheumatology Fellow  Pager [phone]             Interval History:   Pt continues evaluation for epilepsy. Last night she was sleep deprived. The sore in her mouth has stabilized after getting progressively worse in preceeding days.               Review of Systems:   Negative except as above             Medications:     Current Facility-Administered Medications   Medication     clindamycin (CLEOCIN) capsule 300 mg     EPINEPHrine (ADRENALIN) kit 0.3 mg     albuterol (PROAIR HFA/PROVENTIL HFA/VENTOLIN HFA) Inhaler 2 puff     amitriptyline (ELAVIL) tablet 50 mg     apremilast (OTEZLA) tablet 30 mg     aspirin chewable tablet 81 mg     aspirin-acetaminophen-caffeine  (EXCEDRIN MIGRAINE) per tablet 1 tablet     colchicine tablet 1.2 mg     dicyclomine (BENTYL) tablet 20 mg     diphenhydrAMINE (BENADRYL) capsule 25 mg     hydrOXYzine (ATARAX) tablet 25-50 mg     hyoscyamine (ANASPAZ/LEVSIN) tablet 125-250 mcg     linaclotide (LINZESS) capsule 290 mcg     omeprazole (priLOSEC) CR capsule 40 mg     ondansetron (ZOFRAN-ODT) ODT tab 8 mg     norgestimate-ethinyl estradiol (ORTHO-CYCLEN, SPRINTEC) 0.25-35 MG-MCG per tablet 1 tablet     sucralfate (CARAFATE) suspension 1 g     colchicine tablet 0.6 mg     lidocaine 1 % 1 mL     lidocaine (LMX4) kit     sodium chloride (PF) 0.9% PF flush 3 mL     sodium chloride (PF) 0.9% PF flush 3 mL     magnesium hydroxide (MILK OF MAGNESIA) suspension 30 mL     lidocaine (viscous) (XYLOCAINE) 2 % solution 5 mL     bacitracin ointment     LORazepam (ATIVAN) injection 2 mg     magic mouthwash suspension (diphenhydramine, lidocaine, aluminum-magnesium & simethicone)     guanFACINE (TENEX) tablet 3 mg     GENTEAL TEARS 0.1-0.3 % SOLN 1 drop     lactulose (CHRONULAC) solution 20 g     Lactose Defense Formula (with probiotic) capsule 2 capsule     prochlorperazine (COMPAZINE) tablet 10 mg               Physical Exam:   Temp: 97.3  F (36.3  C) Temp src: Oral BP: 107/69 Pulse: 76   Resp: 16 SpO2: 96 % O2 Device: None (Room air)      Gen: healthy appearing female, with electrodes on head  HEENT: no scleritis/conjuncivitis, tender, mildly erythematous, swollen, tender area in vicinity of left bottom wisdom tooth, no aphthous ulcers  CV: RRR  Resp: breathing comfortably on room air  Skin: no rashes  MSK: no joint synovitis  Neuro: nml mentation, EOMI, no gross deficits           Data:   No new labs or imaging      Attending Note: I saw and evaluated the patient with Dr. Mendoza. I agree with the assessment and plan.    Lm Pinon MD

## 2017-12-22 NOTE — OP NOTE
"EP PROCEDURE NOTE    Procedures:  1. TILT table test.  2. Autonomic function test.    Cardiologist: Jeyson  EP Fellow: marquis    Pre-operative Diagnosis:  Spells  .  Complications: None.     Medications:  NTG 0.4mg SL      Clinical Profile:  Samara Oropeza is a 22 year old woman hospitalized on the Neurology service for a variety of \"spells\".  One type is described as up to 15 minutes of convulsions followed by loss of consciousness; one type she describes as falling with a prodrome of feeling dizzy and warm, up to 1 minute of loss of consciousness, and feeling nauseous afterwards (about 4 such episodes); one type is noted in the chart as being up to 10 seconds of staring into space.  I was asked to perform a tilt table test while she has EEG monitoring.      PROCEDURE  The risks and benefits of the procedure were explained to the patient in full.  Written informed consent was obtained. The patient was brought to the EP lab in a fasting and hemodynamically stable condition. Physical examination of the patient did not reveal any carotid bruits, and review of the chart did not reveal the presence of stroke or TIA within the past three months.  She was given 500 cc IV NS to account for being NPO for 12 hours.  The following maneuvers were done sequentially:  1- Supine for 50 minutes: /77 (cuff) 97/67 (Nexfin)  mmHg, HR 95 bpm. No significant change in hemodynamic profile. No symptoms.  2- Maneuvers in supine position:     A. Carotid sinus massage:         No change in HR or BP, no symptoms     B. Cough: Jem BP 97/55 mmHg,  bpm, Recovery approx 0 sec (no significant change), No symptoms.     C. Valsalva: Jem BP 85/63 mmHg, HR 76 bpm, Recovery approx about 60 sec, No symptoms.     This was consistent with physiologic response.  3- Supine rest for 5 minutes: No significant change in hemodynamic profile.  4- TILT at 70 degrees for 25 minutes:   Time  HR BP  Comments  pretilt "   76 84/62  Nexfin  0  98 77/65  Table up (cuff 113/69)  1  95 89/69   2  99 76/63  3  103 83/70  Cuff 111/75  4  101 111/75  5  104 77/65  10  106 95/74  Cuff 113/74  16  115 94/71  Cuff 119/80  20  114 101/77  Cuff 111/82, NTG 0.4 mg CL  21  126 93/71  Cuff 111/81  22  139 99/78  Cuff 112/77  24  178 91/73  Cuff 118/55  25  166 58/27  Cuff 115/63 (while table brought down by RN's discretion.)  Patient felt warm and had headache.    Comment:  Tilt test was negative but terminated prematurely by RN.  She maintained her BP with compensatory sinus tachycardia.  HR was starting to slow down.  This could have been a waning of NTG response (suggesting a negative test), but cannot rule out early indication of a vagal response.  Test would have normally been terminated at 30 minutes.  A practical suggestion would be to recommend liberalization of fluid and salt.  This would be typical first line therapy for vasovagal spells, particularly in a young patient with normal or low BP.  Lew Benítez

## 2017-12-22 NOTE — PLAN OF CARE
Problem: Patient Care Overview  Goal: Plan of Care/Patient Progress Review  Outcome: No Change  VSS. A&Ox4. Neuros intact ex intermittent BUE tremors and intermittent blurred vision. No events witnessed or reported this shift. Leads intact and seizure precautions in place. Stomach cramping relieved w/ prn hyoscyamine x2. Given lidocaine rinse x1 for L jaw pain. Pt started on clindamycin for possible soft tissue infection, given prn Zofran w/ antibiotic per pt request. Pt up SBA + GB. Voiding spont. Has not had a BM for about ~1 week, pt has hx of chronic constipation. Regular diet (no citrus). PIV SL. Possible dc tomorrow or Sat. Continue with POC.

## 2017-12-22 NOTE — MR AVS SNAPSHOT
After Visit Summary   12/22/2017    Samara Oropeza    MRN: [id number]           Patient Information     Date Of Birth          1995        Visit Information        Provider Department      12/22/2017 7:00 AM [facility] EEG TECH 4 [facility] EEG        Today's Diagnoses     Transient alteration of awareness    -  1       Follow-ups after your visit        Your next 10 appointments already scheduled     Dec 26, 2017 11:30 AM CST   Telephone Call with [facility] Nurse 2   [facility] ([facility])    [address] [place] [address], [address]  [facility] [id number] [phone]           Note: this is not an onsite visit; there is no need to come to the facility.            Jan 03, 2018  1:00 PM CST   Colonoscopy with Arian Rollins MD   [facility] ([facility])    [facility] [facility]  [address]  [facility] [id number] [phone]            Jan 16, 2018 11:30 AM CST   Return Visit with Austen Marquez MD   [facility] ([facility])    [address] [place] [id number] [phone]            Jan 17, 2018  2:20 PM CST   (Arrive by 2:05 PM)   New Patient Visit with Estela Cuenca MD   [facility] ([facility])    [address] [place] [id number] [phone]            Jan 24, 2018  2:00 PM CST   (Arrive by 1:45 PM)   Return Visit with EVI Vizcaino [facility] ([facility])    [address] [id number] [phone]            Feb 13, 2018  2:30 PM CST   (Arrive by 2:15 PM)   Return Seizure with Sigifredo Flores MD   [facility] Neurology ([facility])    [address] [place] [id number] [phone]            Feb 27, 2018 10:15 AM CST   [facility]  [facility] Cata Hurst [facility]   [facility]    [address] [place]  [address]  [facility] [address] [phone]            Feb 28, 2018  3:00 PM CST   (Arrive by 2:45 PM)   Return Botox with Frederick Bender MD   [facility] ([email or web address])    [address]  3rd Floor  [facility] [address] [phone]              Who to contact     Please call your clinic at [phone] to:    Ask questions about your health    Make or cancel appointments    Discuss your medicines    Learn about your test results    Speak to your doctor   If you have compliments or concerns about an experience at your clinic, or if you wish to file a complaint, please contact [facility] Patient Relations at [phone] or email us at [email or web address]         Additional Information About Your Visit        [employer] Information     [employer] gives you secure access to your electronic health record. If you see a primary care provider, you can also send messages to your care team and make appointments. If you have questions, please call your primary care clinic.  If you do not have a primary care provider, please call [phone] and they will assist you.      [employer] is an electronic gateway that provides easy, online access to your medical records. With [employer], you can request a clinic appointment, read your test results, renew a prescription or communicate with your care team.     To access your existing account, please contact your [facility] or call [phone] for assistance.        Care EveryWhere ID     This is your Care EveryWhere ID. This could be used by other organizations to access your [facility] medical records  [id number]        Your Vitals Were     Last Period                   11/28/2017 (Exact Date)            Blood Pressure  from Last 3 Encounters:   12/22/17 115/69   12/18/17 131/76   12/12/17 110/64    Weight from Last 3 Encounters:   12/20/17 68.9 kg (152 lb)   12/18/17 69.3 kg (152 lb 11.2 oz)   12/12/17 70.8 kg (156 lb)              Today, you had the following     No orders found for display         Today's Medication Changes      Notice     This visit is during an admission. Changes to the med list made in this visit will be reflected in the After Visit Summary of the admission.             Primary Care Provider Office Phone # Fax #    Sonja Winchester Brady, EVI MATT [phone] [address] [facility] [address]  [facility] [address]        Equal Access to Services     NABIL GALEANO : Brandon byerso Sotiffany, waaxda luqadaha, qaybta kaalmada adeegyamariya, mike rodriguez . So [facility] [phone].    ATENCIÓN: Si habla español, tiene a livingston disposición servicios gratuitos de asistencia lingüística. Llame al [phone].    We comply with applicable federal civil rights laws and [place] laws. We do not discriminate on the basis of race, color, national origin, age, disability, sex, sexual orientation, or gender identity.            Thank you!     Thank you for choosing [facility]  for your care. Our goal is always to provide you with excellent care. Hearing back from our patients is one way we can continue to improve our services. Please take a few minutes to complete the written survey that you may receive in the mail after your visit with us. Thank you!             Your Updated Medication List - Protect others around you: Learn how to safely use, store and throw away your medicines at www.disposemymeds.org.      Notice     This visit is during an admission. Changes to the med list made in this visit will be reflected in the After Visit Summary of the admission.

## 2017-12-22 NOTE — PLAN OF CARE
Problem: Seizure Disorder/Epilepsy (Adult)  Goal: Signs and Symptoms of Listed Potential Problems Will be Absent, Minimized or Managed (Seizure Disorder/Epilepsy)  Signs and symptoms of listed potential problems will be absent, minimized or managed by discharge/transition of care (reference Seizure Disorder/Epilepsy (Adult) CPG).   Outcome: Adequate for Discharge Date Met: 12/22/17  VSS. Denies pain. Pt NPO most of day for Tilt table. Neuros unchanged; BUE tremors. Blurry vision at times in left eye. Had tilt table. Up SBA. Pt nauseated off and on during day, scheduled meds given. Poor po intake. Voiding spontaneously. Last BM one week ago, declined intervention. Pt is being discharged home at this time. DC meds gone over with no further questions. Pt home medications were also given back from Med bin.

## 2017-12-22 NOTE — PROCEDURES
EEG#:  -1       VIDEO EEG DAY       DATE OF RECORDIN2017      SOURCE FILE DURATION:  12 hours 34 minutes.      CLINICAL SUMMARY:  Samara Oropeza is a 22-year-old female with a history of multiple spells which have not previously been characterized on EEG.  She underwent an inpatient EEG monitoring procedure for spell characterization and concern for possible seizures.        MEDICATIONS:  The patient is on no scheduled anticonvulsants at this time.      TECHNICAL SUMMARY:  This video-EEG monitoring procedure was performed using 23 scalp electrodes in the 10-20 system placement with additional scalp, precordial and other surface electrodes used for electrical reference and artifact detection.  Video monitoring was utilized and reviewed periodically by the EEG technologist and the physician for electroclinical correlation.      INTERICTAL FINDINGS:  During maximal wakefulness, the background demonstrated a symmetric, well-modulated 11-12 Hz posterior dominant rhythm with superimposed excess diffuse beta activity.  In drowsiness, the background waxed and waned and with repetitive V waves seen during deeper stages of drowsiness.  Stage II sleep was manifested by the presence of symmetric K complexes and sleep spindles.  No focal slowing and no epileptiform discharges were seen at any time.  Rare arciform sharply contoured discharges were seen over the C3 and rarely the C4 electrodes - this appeared to be consistent with a mu rhythm as it occurred at a regular rate of approximately 10 Hz only during wakefulness.      ACTIVATING PROCEDURES:  Photic stimulation was completed and produced a prominent driving response which was overall symmetric.  Hyperventilation was completed and produced mild symmetric theta slowing.      EVENTS: Multiple clinical events were noted marked on this date of recording.    The first occurred at around 12:50.  The patient was sitting up in bed, resting quietly, appeared to  be watching TV, when she began staring straight ahead towards the wall for about 30 seconds and then her head dips down and her eyes close and she develops some small, somewhat non-rhythmic nodding movements.  Her boyfriend, who is sitting at bedside, notices this, gets up and speaks with her.  She appears to respond to him; however, due to microphone placement her speech is not clearly appreciated on this recording.  The patient then after apparently conversing with him then leans back, has her head arched toward to the right.  She then pushes her bag which was sitting on the bed off onto the floor and then turns toward the left and lies in the fetal position.  Boyfriend presses the event marker and a nurse then walks into the room, asks the patient how she is feeling.  It seems that she does respond; however, it is again difficult to hear the patient's voice.  The nurse asked the patient to give the date and state where she is.  The patient appears to respond to this, though again her answers cannot be heard.  The patient does look directly at the nurse throughout this and appears to be interacting fairly appropriately with the nurse while still lying on her left side in the fetal position.  As the nurse proceeds through her neurologic exam, the patient moves in bed and lies on her back and appears to be at her baseline.  Electrographically, throughout this entire event, the EEG demonstrates only the patient's normal resting baseline rhythm with an 11 Hz PDR and superimposed diffuse beta activity.      Second event was marked at 2131.  Clinically, the patient is lying awake, eyes open on her right side, staring to her right but adjusting periodically in bed, when suddenly her head drops down.  She mumbles something unintelligibly.  Her boyfriend is sitting at her bedside, gets up and begins talking with her.  She moves about in bed, touches her forehead, responds to him.  Her words are soft and not picked up on  the camera, however. She then lies back in bed, takes off her glasses, appears distressed, places her left hand over her face.  Her boyfriend presses the event marker.  The nurse enters the room and assesses the patient at this time.  She continues to lie on her right side with her eyes closed until the nurse begins her assessment.  Then, the patient is able to appropriately open her eyes, follow commands to point to the door.  She is able to name an object.  She is able to read a sentence.  She repositions herself in bed and briefly a high frequency tremor can be seen in her right hand, lasting for about 30 seconds.  After this stops, she continues to sit up in bed and talk with those in the room and appears to be at her baseline.  Electrographically, there is no EEG change from her normal baseline background rhythm throughout this entire event.      IMPRESSION VIDEO EEG DAY 1: This continuous EEG monitoring procedure was normal during wakefulness, drowsiness and sleep.  Two spells were noted by the patient and her family, one where she was staring with her head dropping down and then moved into the fetal position and another where she was staring, her head dropped down and then she later on developed some brief right hand tremor.  None of these spells had any associated EEG change and were therefore not consistent with electrographic seizures.  Clinical correlation is required.         ISIS GARCIA MD       As dictated by SANJUANA KASPER MD     I agree with the findings as reported. I personally reviewed this EEG recording and made edits to this report as needed.     Isis Garcia MD   Epilepsy Attending              D: 2017 16:44   T: 2017 14:26   MT: SK      Name:     LUCINA BAH   MRN:      [id number]        Account:        [id number]   :      1995           Procedure Date: 2017      Document: [id number]
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
## 2017-12-22 NOTE — PLAN OF CARE
Problem: Patient Care Overview  Goal: Plan of Care/Patient Progress Review  Outcome: No Change    VSS.  Reports abdominal cramping and mouth pain; declined interventions overnight.  Neuros intact except intermittent BUE tremors.  Denied blurred vision.  VEEG leads in place; no events witnessed or reported.  PIV SL.  Pt NPO until tilt table time confirmed.  Otherwise on regular diet with no citrus; has had poor appetite.  Nausea present; received PRN Zofran with antibiotic.  Denies issues with bladder.  Reports constipation; declined interventions.   Up with SBA and GB.  Pt to be up in harness BID for 60 minutes once in room with a lift. Possible d/c home today.  Continue with POC.

## 2017-12-22 NOTE — PROCEDURES
"VIDEO EEG#:  -2      VIDEO EEG DAY 2     DATE OF RECORDIN2017       SOURCE FILE DURATION:  23 hours 12 minutes.      CLINICAL SUMMARY:  Courtney Oropeza is a 22-year-old female who underwent an inpatient video EEG monitoring procedure for characterization of spells with concern for possible seizures.      MEDICATIONS:  The patient is on no anticonvulsants at this time.      TECHNICAL SUMMARY:  This video EEG monitoring procedure was performed using 23 scalp electrodes in the 10-20 system placement with additional scalp, precordial and other surface electrodes used for electrical referencing and artifact detection.  Video monitoring was utilized and reviewed periodically by the EEG technologist and the physician for electroclinical correlation.      INTERICTAL FINDINGS:  During maximal wakefulness, the background demonstrated a symmetric, fairly well modulated 11 Hz posterior dominant rhythm that attenuated with eye opening, a mild amount of diffuse beta could also be seen.  In drowsiness, the background waxed and waned and roving eye movements could be seen.  Stage 2 sleep was manifested by the presence of symmetric K complexes and sleep spindles.  No focal slowing and no epileptiform discharges were seen.      ACTIVATING PROCEDURES:  Hyperventilation was completed and produced a mild amount of generalized theta which resolved about 1 minute after the end of hyperventilation.  The patient reported subjective dizziness following hyperventilation.      EVENTS:  One spell was captured at 1210.  Clinically, the patient is off camera in her harness at the start of this event.  Later on, she reported that while she was ambulating she felt \"numbness\" and \"prickliness\" in her legs.  She then comes into view of the camera, sits down on the edge of the bed and is tested by her nurse who asks if she had an event and the patient says \"yes\".  She is then asked to point to the ceiling which she is able to do.  " She is given a cue word which she will recall later.  She is able to read a sentence and she is oriented to place.  Electrographically throughout this entire event, while some movement artifacts related to the patient's ambulating around the room, as well as a moving into her bed, are seen.  Aside from this, the patient's normal background is seen with no ictal changes appreciated at any time.      IMPRESSION VIDEO EEG DAY 2:  This EEG monitoring procedure was normal during wakefulness, drowsiness and sleep.  No focal slowing and no epileptiform discharges were seen at any time. One spell occurred while ambulating in the harness. The patient was off camera at the onset, but did later report this was a typical event for her with a numb, prickly feeling in her legs and then a subsequent feeling of disorientation.  No ictal changes were seen on the EEG throughout this entire spell.  No electrographic seizures were seen at any time on this date of monitoring.  Clinical correlation is advised.         ISIS GARCIA MD       As dictated by SANJUANA KASPER MD     I agree with the findings as reported. I personally reviewed this EEG recording and made edits to this report as needed.     Isis Garcia MD   Epilepsy Attending                D: 2017 16:51   T: 2017 14:33   MT: SK      Name:     LUCINA BAH   MRN:      [id number]        Account:        [id number]   :      1995           Procedure Date: 2017      Document: [id number]

## 2017-12-23 NOTE — PROCEDURES
EEG #:        TILT TABLE STUDY      DATE OF RECORDIN2017      SOURCE FILE DURATION:  1 hour and 48 minutes      PATIENT INFORMATION:  Samara Oropeza is a 22-year-old female with a history of recurrent spells.  The patient has had syncopal spells in which she has fallen and hurt her ankle which required surgery.  There were some components of her spells that were concerning for syncope, so video EEG with tilt is being done to evaluate for cardiac etiology for syncope.      MEDICATIONS:  Elavil, Tenex, and Atarax.     TECHNICAL SUMMARY: This video EEG monitoring procedure was performed during tilt table with 23 scalp electrodes in 10-20 system placements, and additional scalp, precordial and other surface electrodes used for electrical referencing and artifact detection. No electroclinical seizures or any electrographic seizures were seen. Video was reviewed intermittently by EEG technologist and physician for clinical seizures.      BACKGROUND:  The patient has a well-formed parietooccipital rhythm of 9-10 Hz, which is symmetric and reactive.  Frontal derivations of symmetric activity.  She is awake for this entire file.  EEG is normal.  There is no focal slowing.  There are no electrographic seizures and there are no epileptiform discharges.      During the tilt table procedure, the patient was given nitroglycerin and she was upright for nearly 25 minutes.  The patient stated she was feeling very anxious at 11:36:33 and stated she could not breathe, she was breathing heavy on the video, leaned over to the right, flexed her head and closed her eyes, she appeared to have fainted at 11:36 and was not responding.  On the EEG there was a lot of electromyogenic artifact with underlying diffuse delta slowing.  The patient did not have an EEG seizure at this time.  On the 1-lead EKG monitor, she is tachycardic at around 120 beats per minute. The table returned to supine position and she woke up, she began  to blink and had a well-formed parietooccipital rhythm by around 11:37.      IMPRESSION:  Video EEG outside of the tilt maneuver was within normal limits.  The patient was given nitroglycerin and put upright during which time she was anxious, had difficulty breathing, and appeared to have a syncopal spell.  The EEG during this time had some background slowing, but no electrographic seizure was seen and patient was tachycardic.  During this tilt, there were no electrographic seizures accompanied with her spell.         GUZMAN GARCIA MD             D: 2017 17:24   T: 2017 18:45   MT: [email or web address]      Name:     LUCINA BAH   MRN:      [id number]        Account:        [id number]   :      1995           Procedure Date: 2017      Document: [id number]

## 2017-12-24 NOTE — PROCEDURES
VIDEO EEG#:  -4        VIDEO EEG DATE:  12/22/2017        SOURCE FILE DURATION:  10 hours 51 minutes.       CLINICAL SUMMARY:  Courtney Oropeza is a 22-year-old female who underwent an inpatient video EEG monitoring procedure for characterization of spells with concern for possible seizures.       MEDICATIONS: No anticonvulsants at this time.       TECHNICAL SUMMARY: This video EEG monitoring procedure was performed with 23 scalp electrodes in 10-20 system placements, and additional scalp, precordial and other surface electrodes used for electrical referencing and artifact detection. No electroclinical seizures or any electrographic seizures were seen. Video was reviewed intermittently by EEG technologist and physician for clinical seizures.      BACKGROUND ACTIVITY:  During wakefulness, the background activity consists of synchronous and symmetric, well modulated, 9-10 Hz posterior dominant rhythm that attenuated with eye opening. During drowsiness, the background activity waxed and waned and there were periods of slowing and attenuation of the posterior alpha rhythm. Stage I sleep, Stage II sleep and Stage 3 sleep  was recorded in which synchronous and symmetrical vertex waves , K-complexes and sleep spindles were identified.  No focal abnormalities were observed.     ACTIVATION PROCEDURE: Photic stimulation was not done. Hyperventilation did not produce any significant abnormalities on the EEG.      EPILEPTIFORM DISCHARGES: No epileptiform activity was recorded.     ICTAL:  No electrographic seizures were recorded. . Video was reviewed intermittently by EEG technologist and physcian for clinical seizures.       IMPRESSION OF VIDEO EEG DAY #4: This is a normal awake and sleep video electroencephalogram. No electrographic seizures or epileptiform discharges were recorded.  No electrographic seizure or epileptiform discharges were seen.      SUMMARY OF 4 DAYS OF VIDEO EEG:  Video EEG during 4 days was normal.  EEG day #2, after hyperventilation, the patient reported tingling in her hands and feet.  She did not have alteration of awareness and behavioral testing was done.  There were no EEG changes to suggest seizure activity.  On day 3 she had another spell during hyperventilation where she felt funny and her hands and feet felt prickly/pressure in head.  She later explained that as a tingling sensation.  Orientation questions were completed and patient was able to answer everything and had no overt loss of awareness during this time.  Again, EEG during this time had no seizure activity.  During the 4 days of video EEG recording, patient had no electrographic seizures, no epileptiform discharges and her EEG was essentially normal.         GUZMAN GARCIA MD             D: 2017 16:44   T: 2017 11:08   MT: SIERRA      Name:     LUCINA BAH   MRN:      [id number]        Account:        [id number]   :      1995           Procedure Date: 2017      Document: [id number]

## 2017-12-25 NOTE — PROCEDURES
"VIDEO EEG#:  -3        VIDEO EEG DATE:  12/21/2017        SOURCE FILE DURATION:  23 hours 23 minutes.       CLINICAL SUMMARY:  Courtney Oropeza is a 22-year-old female who underwent an inpatient video EEG monitoring procedure for characterization of spells with concern for possible seizures.       MEDICATIONS: No anticonvulsants at this time.       TECHNICAL SUMMARY: This video EEG monitoring procedure was performed with 23 scalp electrodes in 10-20 system placements, and additional scalp, precordial and other surface electrodes used for electrical referencing and artifact detection. No electroclinical seizures or any electrographic seizures were seen. Video was reviewed intermittently by EEG technologist and physician for clinical seizures.     BACKGROUND ACTIVITY:  During wakefulness, the background activity consists of synchronous and symmetric, well modulated, 9-10 Hz posterior dominant rhythm that attenuated with eye opening. During drowsiness, the background activity waxed and waned and there were periods of slowing and attenuation of the posterior alpha rhythm. Stage I sleep, Stage II sleep and Stage 3 sleep  was recorded in which synchronous and symmetrical vertex waves , K-complexes and sleep spindles were identified.  No focal abnormalities were observed.    ACTIVATION PROCEDURE: Photic stimulation was not done. Hyperventilation did not produce any significant abnormalities on the EEG. During hyperventilation she felt funny, she had \"prickly sensation in the hands and feet, legs were numb, and lots of pressure in the head\". These were some of her target events. During this time there was no EEG seizure activity. This was thought to be related to hyperventilation .     EPILEPTIFORM DISCHARGES: No epileptiform activity was recorded.    ICTAL:  No electrographic seizures were recorded. . Video was reviewed intermittently by EEG technologist and physcian for clinical seizures. During hyperventilation " "she felt funny, she had \"prickly sensation in the hands and feet, legs were numb, and lots of pressure in the head\". These were some of her target events. During this time there was no EEG seizure activity. This was thought to be related to hyperventilation .       IMPRESSION OF VIDEO EEG DAY #3: This is a normal awake and sleep video electroencephalogram. No electrographic seizures or epileptiform discharges were recorded. During hyperventilation she felt funny, she had \"prickly sensation in the hands and feet, legs were numb, and lots of pressure in the head\". These was a target event. During this time there was no EEG seizure activity. This was thought to be related to hyperventilation .  No electrographic seizure or epileptiform discharges were seen.     GUZMAN GARCIA MD  "

## 2017-12-26 ENCOUNTER — VIRTUAL VISIT (OUTPATIENT)
Dept: NEUROLOGY | Facility: CLINIC | Age: 22
End: 2017-12-26
Payer: COMMERCIAL

## 2017-12-26 DIAGNOSIS — R40.4 TRANSIENT ALTERATION OF AWARENESS: Primary | ICD-10-CM

## 2017-12-26 LAB — INTERPRETATION ECG - MUSE: NORMAL

## 2017-12-26 NOTE — PROGRESS NOTES
Discharge phone call:     Samara Oropeza was admitted to [facility] 12/18 - 12/22/17 for characterization.  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  During her admission Samara had a staring spell and spell of dizziness with no EEG correlate.  Medications were reviewed/updated in EMR.  Follow up with Dr. Flores scheduled for 2/13/18 at 2:15 PM
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
## 2017-12-26 NOTE — MR AVS SNAPSHOT
After Visit Summary   12/26/2017    Samara Oropeza    MRN: [id number]           Patient Information     Date Of Birth          1995        Visit Information        Provider Department      12/26/2017 11:30 AM 2, Me [facility] Nurse [facility]        Today's Diagnoses     Transient alteration of awareness    -  1       Follow-ups after your visit        Your next 10 appointments already scheduled     Jan 03, 2018  1:00 PM CST   Colonoscopy with Arian Rollins MD   [facility] ([facility])    [address] [place] [address]  [address]  [facility] [id number] [phone]            Jan 16, 2018 11:30 AM CST   Return Visit with Austen Marquez MD   [facility] ([facility])    [address] [id number] [phone]            Jan 17, 2018  2:20 PM CST   (Arrive by 2:05 PM)   New Patient Visit with Estela Cuenca MD   [facility] ([facility]    [address] [place] [id number] [phone]            Jan 24, 2018  2:00 PM CST   (Arrive by 1:45 PM)   Return Visit with EVI Vizcaino [facility] ([facility])    [address] [place] [id number] [phone]            Feb 13, 2018  2:30 PM CST   (Arrive by 2:15 PM)   Return Seizure with Sigifredo Flores MD   [facility] Neurology ([facility])    [address] [place] [id number] [phone]            Feb 27, 2018 10:15 AM CST   Return Visit with Cata Hurst NP   [facility] ([facility])    [address]  [address]  [facility] [address] [phone]            Feb 28, 2018  3:00 PM CST   (Arrive by 2:45 PM)   Return Botox with Frederick Bender MD    [facility] ([facility])    [address] [facility]  3rd [facility] [address] [phone]              Who to contact     Please call your clinic at [phone] to:    Ask questions about your health    Make or cancel appointments    Discuss your medicines    Learn about your test results    Speak to your doctor   If you have compliments or concerns about an experience at your clinic, or if you wish to file a complaint, please contact [facility] Patient Relations at [phone] or email us at [email or web address]         Additional Information About Your Visit        [employer] Information     [employer] gives you secure access to your electronic health record. If you see a primary care provider, you can also send messages to your care team and make appointments. If you have questions, please call your primary care clinic.  If you do not have a primary care provider, please call [phone] and they will assist you.      [employer] is an electronic gateway that provides easy, online access to your medical records. With [employer], you can request a clinic appointment, read your test results, renew a prescription or communicate with your care team.     To access your existing account, please contact your [facility] or call [phone] for assistance.        Care EveryWhere ID     This is your Care EveryWhere ID. This could be used by other organizations to access your [place] medical records  [id number]        Your Vitals Were     Last Period                   11/28/2017 (Exact Date)            Blood Pressure from Last 3 Encounters:   12/22/17 115/69   12/18/17 131/76   12/12/17 110/64    Weight from Last 3 Encounters:   12/20/17 152 lb (68.9 kg)   12/18/17 152 lb 11.2 oz (69.3 kg)   12/12/17 156 lb (70.8 kg)              Today, you had the following     No orders found for display          Today's Medication Changes          These changes are accurate as of: 12/26/17  2:10 PM.  If you have any questions, ask your nurse or doctor.               These medicines have changed or have updated prescriptions.        Dose/Directions    apremilast 30 MG tablet   Commonly known as:  OTEZLA   This may have changed:    - how much to take  - when to take this  - additional instructions   Used for:  Behcet's disease (H)        30mg twice daily.   Quantity:  180 tablet   Refills:  0       linaclotide 290 MCG capsule   Commonly known as:  LINZESS   This may have changed:    - when to take this  - additional instructions   Used for:  Irritable bowel syndrome        Dose:  290 mcg   Take 1 capsule (290 mcg) by mouth every morning (before breakfast)   Quantity:  90 capsule   Refills:  1                Primary Care Provider Office Phone # Fax #    Sonja EVI Laguerre [phone]       [address]  [facility] [address]        Equal Access to Services     [facility] : Hadii malcolm brady hadasho Soomaali, waaxda luqadaha, qaybta kaalmada adeegyada, waxay verónicain ruy rodriguez . So [facility] [phone].    ATENCIÓN: Si habla español, tiene a livingston disposición servicios gratuitos de asistencia lingüística. Jesus al [phone].    We comply with applicable federal civil rights laws and [place] laws. We do not discriminate on the basis of race, color, national origin, age, disability, sex, sexual orientation, or gender identity.            Thank you!     Thank you for choosing [facility]  for your care. Our goal is always to provide you with excellent care. Hearing back from our patients is one way we can continue to improve our services. Please take a few minutes to complete the written survey that you may receive in the mail after your visit with us. Thank you!             Your Updated Medication List - Protect others around you: Learn how to safely use, store and  throw away your medicines at www.disposemymeds.org.          This list is accurate as of: 12/26/17  2:10 PM.  Always use your most recent med list.                   Brand Name Dispense Instructions for use Diagnosis    albuterol 108 (90 BASE) MCG/ACT Inhaler    PROAIR HFA/PROVENTIL HFA/VENTOLIN HFA    1 Inhaler    Inhale 2 puffs into the lungs every 6 hours as needed for shortness of breath / dyspnea or wheezing    Atypical chest pain       amitriptyline 50 MG tablet    ELAVIL    30 tablet    Take 1 tablet (50 mg) by mouth At Bedtime    Abdominal pain, generalized, Insomnia due to medical condition       apremilast 30 MG tablet    OTEZLA    180 tablet    30mg twice daily.    Behcet's disease (H)       ASPIRIN CHILDRENS 81 MG chewable tablet   Generic drug:  aspirin      Take 81 mg by mouth as needed        aspirin-acetaminophen-caffeine 250-250-65 MG per tablet    EXCEDRIN MIGRAINE     Take 1 tablet by mouth every 6 hours as needed for headaches        betamethasone valerate 0.1 % cream    VALISONE     Apply topically 2 times daily        * botulinum toxin type A 100 UNITS injection    BOTOX    200 Units    Inject 200 Units into the muscle every 3 months    Cervical dystonia       * BOTOX IJ      Inject 165 Units into the muscle once Lot /C3 Exp 06/2020        carbamide peroxide 6.5 % otic solution    DEBROX     5 drops 2 times daily        clindamycin 300 MG capsule    CLEOCIN    28 capsule    Take 1 capsule (300 mg) by mouth 3 times daily Last dose 12/31/17.    Oral infection       clobetasol 0.05 % cream    TEMOVATE    60 g    Apply topically 2 times daily    Folliculitis       Colchicine 0.6 MG Caps     90 capsule    Take 0.6 mg by mouth See Admin Instructions 2 capsules in AM and 1 capsule in PM    Behcet's syndrome (H)       COMPOUNDED NON-CONTROLLED SUBSTANCE - PHARMACY TO MIX COMPOUNDED MEDICATION    CMPD RX    30 capsule    Take one capsule in the morning    Fibromyalgia       diclofenac 1 % Gel  topical gel    VOLTAREN    100 g    Apply 2-4 grams to affected area(s) up to 4 times per day as needed. This is an anti-inflammatory medication.    Polyarthralgia       dicyclomine 20 MG tablet    BENTYL    60 tablet    Take 1 tablet (20 mg) by mouth 4 times daily as needed    Abdominal cramping       diltiazem 2% in PLO cream ([facility] COMPOUNDED) 2% Gel     30 g    Apply small pea size amount three times daily to anus until pain is gone.    Anal fissure       diphenhydrAMINE 25 MG tablet    BENADRYL    30 tablet    Take 1 tablet (25 mg) by mouth every 8 hours as needed for allergies        EPINEPHrine 0.3 MG/0.3ML injection 2-pack    EPIPEN 2-EAMON    0.6 mL    Inject 0.3 mLs (0.3 mg) into the muscle as needed for anaphylaxis    Hx of bee sting allergy       guanFACINE 1 MG tablet    TENEX    270 tablet    Take 3 tablets (3 mg) by mouth 2 times daily    Tic       hydrOXYzine 25 MG tablet    ATARAX    90 tablet    Take 1-2 tablets (25-50 mg) by mouth every 6 hours as needed for anxiety    TAHIR (generalized anxiety disorder)       hyoscyamine 0.125 MG tablet    ANASPAZ/LEVSIN    40 tablet    Take 1-2 tablets (125-250 mcg) by mouth every 4 hours as needed for cramping    Abdominal pain, generalized       hypromellose 0.3 % Soln ophthalmic solution    GENTEAL     1 drop every hour as needed for dry eyes        LACTAID PO      Take by mouth daily        lactulose 20 GM/30ML Soln     300 mL    Take 30 mLs by mouth 3 times daily as needed    Constipation, unspecified constipation type       lidocaine 2 % topical gel    XYLOCAINE     Apply 1 Tube topically daily Reported on 4/21/2017        lidocaine visc 2% 2.5mL/5mL & maalox/mylanta w/ simeth 2.5mL/5mL & diphenhydrAMINE 5mg/5mL Susp suspension    [facility]    1 Bottle    Swish and swallow 10 mLs in mouth every 6 hours as needed for mouth sores    Behcet's syndrome (H)       linaclotide 290 MCG capsule    LINZESS    90 capsule    Take 1 capsule (290 mcg) by mouth  every morning (before breakfast)    Irritable bowel syndrome       LORazepam 1 MG tablet    ATIVAN    60 tablet    Take 0.5 tablets (0.5 mg) by mouth 2 times daily as needed for other (atypical chest pain) Do not operate a vehicle after taking this medication    Chronic abdominal pain       meclizine 25 MG tablet    ANTIVERT    30 tablet    Take 1 tablet (25 mg) by mouth every 6 hours as needed for dizziness    Nausea       omeprazole 40 MG capsule    priLOSEC    90 capsule    Take 1 capsule (40 mg) by mouth daily    Gastroesophageal reflux disease, esophagitis presence not specified       ondansetron 8 MG ODT tab    ZOFRAN-ODT    60 tablet    Take 1 tablet (8 mg) by mouth every 8 hours as needed for nausea    Non-intractable vomiting with nausea, unspecified vomiting type, Hematemesis, presence of nausea not specified       prazosin 1 MG capsule    MINIPRESS    30 capsule    Take 1 capsule (1 mg) by mouth At Bedtime For nightmares.    PTSD (post-traumatic stress disorder)       SPRINTEC 28 0.25-35 MG-MCG per tablet   Generic drug:  norgestimate-ethinyl estradiol     84 tablet    TAKE 1 TABLET BY MOUTH ONCE DAILY.    General counseling for prescription of oral contraceptives       sucralfate 1 GM/10ML suspension    CARAFATE    1200 mL    Take 10 mLs (1 g) by mouth 4 times daily    Bile reflux gastritis, Nausea       triamcinolone 0.1 % cream    KENALOG    15 g    Apply sparingly to oral ulcers three times daily for 14 days as needed.    Behcet's syndrome (H)       * Notice:  This list has 2 medication(s) that are the same as other medications prescribed for you. Read the directions carefully, and ask your doctor or other care provider to review them with you.

## 2017-12-27 ENCOUNTER — TELEPHONE (OUTPATIENT)
Dept: GASTROENTEROLOGY | Facility: OUTPATIENT CENTER | Age: 22
End: 2017-12-27

## 2017-12-27 DIAGNOSIS — K62.5 RECTAL BLEEDING: Primary | ICD-10-CM

## 2017-12-27 RX ORDER — SIMETHICONE 125 MG
125 CAPSULE ORAL SEE ADMIN INSTRUCTIONS
Qty: 1 CAPSULE | Refills: 0 | Status: SHIPPED | OUTPATIENT
Start: 2017-12-27 | End: 2018-01-16

## 2017-12-27 RX ORDER — PEG-3350, SODIUM SULFATE, SODIUM CHLORIDE, POTASSIUM CHLORIDE, SODIUM ASCORBATE AND ASCORBIC ACID 7.5-2.691G
1 KIT ORAL SEE ADMIN INSTRUCTIONS
Qty: 1 EACH | Refills: 0 | Status: SHIPPED | OUTPATIENT
Start: 2017-12-27 | End: 2018-01-16

## 2017-12-27 NOTE — TELEPHONE ENCOUNTER
Patient taking any blood thinners ? Aspirin    Heart disease ? Tachycardia    Lung disease ? denies      Sleep apnea ? possible    Diabetic ? denies    Kidney disease ? denies    Dialysis ? n/a    Electronic implanted medical devices ? denies    Are you taking any narcotic pain medication ?  no What is your daily dosage ?    PTSD ? n/a    Prep instructions reviewed with patient ?  Instructions,  policy, MAC sedation plan reviewed. Advised patient to have someone stay with her post exam.  Patient states she is not pregnant or lactating.    [facility] : [facility]    Indication for procedure :  Mobile cecum [Q43.3]  - Primary   Referring provider :EVI Vizcaino CNP     Arrival Time : Instructed patient to arrive at 12 PM

## 2018-01-02 ENCOUNTER — CARE COORDINATION (OUTPATIENT)
Dept: SURGERY | Facility: CLINIC | Age: 23
End: 2018-01-02

## 2018-01-02 NOTE — PROGRESS NOTES
Emily Figueroa Dawn Ann, RN                   Unfortunately that is the first available, and I cannot offer anything sooner.     Thanks!            Previous Messages       ----- Message -----      From: Dione Ray RN      Sent: 1/2/2018   3:15 PM        To: Emily Figueroa   Subject: Call patient                                     Phu Schwab,     Thanks for arranging a consult with Dr. Coburn for this patient.  The patient and her mother are calling requesting a sooner appointment.  I did relay that his schedule was reviewed and that was the first available.     They are requesting a telephone call.  The patients number is [phone].  Her mother is very involved in her care.     Thanks,   Dione

## 2018-01-03 ENCOUNTER — TRANSFERRED RECORDS (OUTPATIENT)
Dept: HEALTH INFORMATION MANAGEMENT | Facility: CLINIC | Age: 23
End: 2018-01-03

## 2018-01-03 ENCOUNTER — DOCUMENTATION ONLY (OUTPATIENT)
Dept: GASTROENTEROLOGY | Facility: OUTPATIENT CENTER | Age: 23
End: 2018-01-03
Payer: COMMERCIAL

## 2018-01-04 NOTE — TELEPHONE ENCOUNTER
APPT INFO    Date /Time: 1/17/18 2:20PM    Reason for Appt: Abd pain, generalized    Ref Provider/Clinic: Self    Are there internal records? Yes/No?  IF YES, list clinic names: [facility] Juan Pablo APRN CNP / Images in PACS   [facility] Bossier APRN CNP   [facility] ED Notes 11/2013- 11/2017 / Images in PACS  [facility] MD  Upper EGD & Colonoscopy 1/3/18   Endoscopy 2/5/17   Upper GI & Colonoscopy  6/11/14   Are there outside records? Yes/No? No    Patient Contact (Y/N) & Call Details: No, pt has recs in Epic.    Action: Closing encounter

## 2018-01-08 ENCOUNTER — APPOINTMENT (OUTPATIENT)
Dept: CT IMAGING | Facility: CLINIC | Age: 23
End: 2018-01-08
Attending: EMERGENCY MEDICINE
Payer: COMMERCIAL

## 2018-01-08 ENCOUNTER — HOSPITAL ENCOUNTER (EMERGENCY)
Facility: CLINIC | Age: 23
Discharge: HOME | End: 2018-01-08
Attending: EMERGENCY MEDICINE | Admitting: EMERGENCY MEDICINE
Payer: COMMERCIAL

## 2018-01-08 ENCOUNTER — TELEPHONE (OUTPATIENT)
Dept: SURGERY | Facility: CLINIC | Age: 23
End: 2018-01-08

## 2018-01-08 VITALS
BODY MASS INDEX: 27.06 KG/M2 | SYSTOLIC BLOOD PRESSURE: 135 MMHG | HEART RATE: 115 BPM | OXYGEN SATURATION: 100 % | TEMPERATURE: 98 F | RESPIRATION RATE: 18 BRPM | DIASTOLIC BLOOD PRESSURE: 75 MMHG | WEIGHT: 152.78 LBS

## 2018-01-08 DIAGNOSIS — B34.9 VIRAL ILLNESS: ICD-10-CM

## 2018-01-08 LAB
ALBUMIN SERPL-MCNC: 3.8 G/DL (ref 3.4–5)
ALBUMIN UR-MCNC: NEGATIVE MG/DL
ALP SERPL-CCNC: 91 U/L (ref 40–150)
ALT SERPL W P-5'-P-CCNC: 34 U/L (ref 0–50)
ANION GAP SERPL CALCULATED.3IONS-SCNC: 8 MMOL/L (ref 3–14)
APPEARANCE UR: CLEAR
AST SERPL W P-5'-P-CCNC: 26 U/L (ref 0–45)
BASOPHILS # BLD AUTO: 0 10E9/L (ref 0–0.2)
BASOPHILS NFR BLD AUTO: 0.2 %
BILIRUB SERPL-MCNC: 0.4 MG/DL (ref 0.2–1.3)
BILIRUB UR QL STRIP: NEGATIVE
BUN SERPL-MCNC: 8 MG/DL (ref 7–30)
CALCIUM SERPL-MCNC: 9 MG/DL (ref 8.5–10.1)
CHLORIDE SERPL-SCNC: 106 MMOL/L (ref 94–109)
CO2 SERPL-SCNC: 26 MMOL/L (ref 20–32)
COLOR UR AUTO: NORMAL
COPATH REPORT: NORMAL
CREAT SERPL-MCNC: 0.72 MG/DL (ref 0.52–1.04)
DIFFERENTIAL METHOD BLD: ABNORMAL
EOSINOPHIL # BLD AUTO: 0.1 10E9/L (ref 0–0.7)
EOSINOPHIL NFR BLD AUTO: 2.5 %
ERYTHROCYTE [DISTWIDTH] IN BLOOD BY AUTOMATED COUNT: 13.9 % (ref 10–15)
GFR SERPL CREATININE-BSD FRML MDRD: >90 ML/MIN/1.7M2
GLUCOSE SERPL-MCNC: 93 MG/DL (ref 70–99)
GLUCOSE UR STRIP-MCNC: NEGATIVE MG/DL
HCG UR QL: NEGATIVE
HCT VFR BLD AUTO: 34.4 % (ref 35–47)
HGB BLD-MCNC: 10.9 G/DL (ref 11.7–15.7)
HGB UR QL STRIP: NEGATIVE
IMM GRANULOCYTES # BLD: 0 10E9/L (ref 0–0.4)
IMM GRANULOCYTES NFR BLD: 0 %
INTERNAL QC OK POCT: YES
KETONES UR STRIP-MCNC: NEGATIVE MG/DL
LACTATE BLD-SCNC: 1 MMOL/L (ref 0.7–2)
LEUKOCYTE ESTERASE UR QL STRIP: NEGATIVE
LIPASE SERPL-CCNC: 142 U/L (ref 73–393)
LYMPHOCYTES # BLD AUTO: 2.2 10E9/L (ref 0.8–5.3)
LYMPHOCYTES NFR BLD AUTO: 43.5 %
MCH RBC QN AUTO: 27.9 PG (ref 26.5–33)
MCHC RBC AUTO-ENTMCNC: 31.7 G/DL (ref 31.5–36.5)
MCV RBC AUTO: 88 FL (ref 78–100)
MONOCYTES # BLD AUTO: 0.3 10E9/L (ref 0–1.3)
MONOCYTES NFR BLD AUTO: 5.7 %
NEUTROPHILS # BLD AUTO: 2.5 10E9/L (ref 1.6–8.3)
NEUTROPHILS NFR BLD AUTO: 48.1 %
NITRATE UR QL: NEGATIVE
NRBC # BLD AUTO: 0 10*3/UL
NRBC BLD AUTO-RTO: 0 /100
PH UR STRIP: 7 PH (ref 5–7)
PLATELET # BLD AUTO: 243 10E9/L (ref 150–450)
POTASSIUM SERPL-SCNC: 3.8 MMOL/L (ref 3.4–5.3)
PROT SERPL-MCNC: 7.2 G/DL (ref 6.8–8.8)
RBC # BLD AUTO: 3.91 10E12/L (ref 3.8–5.2)
RBC #/AREA URNS AUTO: <1 /HPF (ref 0–2)
SODIUM SERPL-SCNC: 140 MMOL/L (ref 133–144)
SOURCE: NORMAL
SP GR UR STRIP: 1.01 (ref 1–1.03)
UROBILINOGEN UR STRIP-MCNC: NORMAL MG/DL (ref 0–2)
WBC # BLD AUTO: 5.1 10E9/L (ref 4–11)
WBC #/AREA URNS AUTO: 0 /HPF (ref 0–2)

## 2018-01-08 PROCEDURE — 99285 EMERGENCY DEPT VISIT HI MDM: CPT | Mod: 25 | Performed by: EMERGENCY MEDICINE

## 2018-01-08 PROCEDURE — 83690 ASSAY OF LIPASE: CPT | Performed by: EMERGENCY MEDICINE

## 2018-01-08 PROCEDURE — 83605 ASSAY OF LACTIC ACID: CPT | Performed by: EMERGENCY MEDICINE

## 2018-01-08 PROCEDURE — 80053 COMPREHEN METABOLIC PANEL: CPT | Performed by: EMERGENCY MEDICINE

## 2018-01-08 PROCEDURE — 25000132 ZZH RX MED GY IP 250 OP 250 PS 637: Performed by: EMERGENCY MEDICINE

## 2018-01-08 PROCEDURE — 81001 URINALYSIS AUTO W/SCOPE: CPT | Performed by: EMERGENCY MEDICINE

## 2018-01-08 PROCEDURE — 85025 COMPLETE CBC W/AUTO DIFF WBC: CPT | Performed by: EMERGENCY MEDICINE

## 2018-01-08 PROCEDURE — 81025 URINE PREGNANCY TEST: CPT | Performed by: EMERGENCY MEDICINE

## 2018-01-08 PROCEDURE — 71250 CT THORAX DX C-: CPT

## 2018-01-08 PROCEDURE — 40000556 ZZH STATISTIC PERIPHERAL IV START W US GUIDANCE

## 2018-01-08 PROCEDURE — 99285 EMERGENCY DEPT VISIT HI MDM: CPT | Mod: Z6 | Performed by: EMERGENCY MEDICINE

## 2018-01-08 RX ORDER — LORAZEPAM 0.5 MG/1
0.5 TABLET ORAL ONCE
Status: COMPLETED | OUTPATIENT
Start: 2018-01-08 | End: 2018-01-08

## 2018-01-08 RX ADMIN — LORAZEPAM 0.5 MG: 0.5 TABLET ORAL at 17:27

## 2018-01-08 NOTE — DISCHARGE INSTRUCTIONS
"  Viral Syndrome (Adult)  A viral illness may cause a number of symptoms. The symptoms depend on the part of the body that the virus affects. If it settles in your nose, throat, and lungs, it may cause cough, sore throat, congestion, and sometimes headache. If it settles in your stomach and intestinal tract, it may cause vomiting and diarrhea. Sometimes it causes vague symptoms like \"aching all over,\" feeling tired, loss of appetite, or fever.  A viral illness usually lasts 1 to 2 weeks, but sometimes it lasts longer. In some cases, a more serious infection can look like a viral syndrome in the first few days of the illness. You may need another exam and additional tests to know the difference. Watch for the warning signs listed below.  Home care  Follow these guidelines for taking care of yourself at home:    If symptoms are severe, rest at home for the first 2 to 3 days.    Stay away from cigarette smoke - both your smoke and the smoke from others.    You may use over-the-counter acetaminophen or ibuprofen for fever, muscle aching, and headache, unless another medicine was prescribed for this. If you have chronic liver or kidney disease or ever had a stomach ulcer or GI bleeding, talk with your doctor before using these medicines. No one who is younger than 18 and ill with a fever should take aspirin. It may cause severe disease or death.    Your appetite may be poor, so a light diet is fine. Avoid dehydration by drinking 8 to 12 8-ounce glasses of fluids each day. This may include water; orange juice; lemonade; apple, grape, and cranberry juice; clear fruit drinks; electrolyte replacement and sports drinks; and decaffeinated teas and coffee. If you have been diagnosed with a kidney disease, ask your doctor how much and what types of fluids you should drink to prevent dehydration. If you have kidney disease, drinking too much fluid can cause it build up in the your body and be dangerous to your " health.    Over-the-counter remedies won't shorten the length of the illness but may be helpful for cough, sore throat; and nasal and sinus congestion. Don't use decongestants if you have high blood pressure.  Follow-up care  Follow up with your healthcare provider if you do not improve over the next week.  Call 911  Get emergency medical care if any of the following occur:    Feeling weak, dizzy, or like you are going to faint    Chest pain, shortness of breath, wheezing, or difficulty breathing  When to seek medical advice  Call your healthcare provider right away if any of these occur:    Cough with lots of colored sputum (mucus) or blood in your sputum    Chest pain, shortness of breath, wheezing, or difficulty breathing    Severe headache; face, neck, or ear pain    Continued vomiting (can t keep liquids down)    Frequent diarrhea (more than 5 times a day); blood (red or black color) or mucus in diarrhea    Feeling weak, dizzy, or like you are going to faint    Extreme thirst    Fever of 100.4 F (38 C) or higher, or as directed by your healthcare provider  Date Last Reviewed: 9/25/2015 2000-2017 [employer]. [address] [place] [address]. All rights reserved. This information is not intended as a substitute for professional medical care. Always follow your healthcare professional's instructions.

## 2018-01-08 NOTE — ED AVS SNAPSHOT
[facility], [place], Emergency Department    [address] [place] [id number]    Phone:  [phone]                                       Samara Oropeza   MRN: [id number]    Department:  [facility], Emergency Department   Date of Visit:  1/8/2018           After Visit Summary Signature Page     I have received my discharge instructions, and my questions have been answered. I have discussed any challenges I see with this plan with the nurse or doctor.    ..........................................................................................................................................  Patient/Patient Representative Signature      ..........................................................................................................................................  Patient Representative Print Name and Relationship to Patient    ..................................................               ................................................  Date                                            Time    ..........................................................................................................................................  Reviewed by Signature/Title    ...................................................              ..............................................  Date                                                            Time

## 2018-01-08 NOTE — ED NOTES
Has known partial bowel obstruction, had colonoscopy on Friday.  Concerned of possible bowel perfusion, occassionally having fevers of 101 at home. Afebrile in traige, pain in abdomin is severe.   LBM this am watery diarrhea

## 2018-01-08 NOTE — ED AVS SNAPSHOT
" [facility], Emergency Department    [address] [facility] [address]    Phone:  [phone]                                       Samara Oropeza   MRN: [id number]    Department:  [facility], Emergency Department   Date of Visit:  1/8/2018           Patient Information     Date Of Birth          1995        Your diagnoses for this visit were:     Viral illness        You were seen by Lizbet Astudillo MD.        Discharge Instructions         Viral Syndrome (Adult)  A viral illness may cause a number of symptoms. The symptoms depend on the part of the body that the virus affects. If it settles in your nose, throat, and lungs, it may cause cough, sore throat, congestion, and sometimes headache. If it settles in your stomach and intestinal tract, it may cause vomiting and diarrhea. Sometimes it causes vague symptoms like \"aching all over,\" feeling tired, loss of appetite, or fever.  A viral illness usually lasts 1 to 2 weeks, but sometimes it lasts longer. In some cases, a more serious infection can look like a viral syndrome in the first few days of the illness. You may need another exam and additional tests to know the difference. Watch for the warning signs listed below.  Home care  Follow these guidelines for taking care of yourself at home:    If symptoms are severe, rest at home for the first 2 to 3 days.    Stay away from cigarette smoke - both your smoke and the smoke from others.    You may use over-the-counter acetaminophen or ibuprofen for fever, muscle aching, and headache, unless another medicine was prescribed for this. If you have chronic liver or kidney disease or ever had a stomach ulcer or GI bleeding, talk with your doctor before using these medicines. No one who is younger than 18 and ill with a fever should take aspirin. It may cause severe disease or death.    Your appetite may be poor, so a light diet is fine. Avoid dehydration by drinking 8 to 12 8-ounce glasses of " fluids each day. This may include water; orange juice; lemonade; apple, grape, and cranberry juice; clear fruit drinks; electrolyte replacement and sports drinks; and decaffeinated teas and coffee. If you have been diagnosed with a kidney disease, ask your doctor how much and what types of fluids you should drink to prevent dehydration. If you have kidney disease, drinking too much fluid can cause it build up in the your body and be dangerous to your health.    Over-the-counter remedies won't shorten the length of the illness but may be helpful for cough, sore throat; and nasal and sinus congestion. Don't use decongestants if you have high blood pressure.  Follow-up care  Follow up with your healthcare provider if you do not improve over the next week.  Call 911  Get emergency medical care if any of the following occur:    Feeling weak, dizzy, or like you are going to faint    Chest pain, shortness of breath, wheezing, or difficulty breathing  When to seek medical advice  Call your healthcare provider right away if any of these occur:    Cough with lots of colored sputum (mucus) or blood in your sputum    Chest pain, shortness of breath, wheezing, or difficulty breathing    Severe headache; face, neck, or ear pain    Continued vomiting (can t keep liquids down)    Frequent diarrhea (more than 5 times a day); blood (red or black color) or mucus in diarrhea    Feeling weak, dizzy, or like you are going to faint    Extreme thirst    Fever of 100.4 F (38 C) or higher, or as directed by your healthcare provider  Date Last Reviewed: 9/25/2015 2000-2017 [employer]. [address] [place] [address]. All rights reserved. This information is not intended as a substitute for professional medical care. Always follow your healthcare professional's instructions.          Future Appointments        Provider Department Dept Phone Center    1/15/2018 9:00 AM [facility] RAD; [facility] IMAGING CENTER XRAY ROOM 2  [facility] Imaging Center Xray [phone] [facility]    1/16/2018 11:30 AM Austen Marquez MD [facility] [phone]     1/17/2018 2:20 PM Estela Cuenca MD [facility] Gastroenterology and IBD Clinic [phone] [facility]    1/24/2018 2:00 PM EVI Vizcaino CNP [facility] Gastroenterology and IBD Clinic [phone] [facility]    1/29/2018 8:30 AM Vitor Coburn MD [facility] Colon and Rectal Surgery [phone] [facility]    2/13/2018 2:30 PM Sigifredo Flores MD [facility] Neurology [phone] [facility]    2/27/2018 10:15 AM Cata Hurst NP [facility] [phone] [place]    2/28/2018 3:00 PM Frederick Bender MD [facility] Physical Medicine and Rehabilitation [phone] [facility]      24 Hour Appointment Hotline       To make an appointment at any [facility], call [phone] ([phone]). If you don't have a family doctor or clinic, we will help you find one. [facility] are conveniently located to serve the needs of you and your family.             Review of your medicines      Our records show that you are taking the medicines listed below. If these are incorrect, please call your family doctor or clinic.        Dose / Directions Last dose taken    albuterol 108 (90 BASE) MCG/ACT Inhaler   Commonly known as:  PROAIR HFA/PROVENTIL HFA/VENTOLIN HFA   Dose:  2 puff   Quantity:  1 Inhaler        Inhale 2 puffs into the lungs every 6 hours as needed for shortness of breath / dyspnea or wheezing   Refills:  1        amitriptyline 50 MG tablet   Commonly known as:  ELAVIL   Dose:  50 mg   Quantity:  30 tablet        Take 1 tablet (50 mg) by mouth At Bedtime   Refills:  11        apremilast 30 MG tablet   Commonly known as:  OTEZLA   Quantity:  180 tablet        30mg twice daily.   Refills:  0        ASPIRIN CHILDRENS 81 MG chewable tablet   Dose:  81 mg   Generic drug:  aspirin        Take 81 mg by mouth as needed   Refills:  0         aspirin-acetaminophen-caffeine 250-250-65 MG per tablet   Commonly known as:  EXCEDRIN MIGRAINE   Dose:  1 tablet        Take 1 tablet by mouth every 6 hours as needed for headaches   Refills:  0        betamethasone valerate 0.1 % cream   Commonly known as:  VALISONE        Apply topically 2 times daily   Refills:  0        * botulinum toxin type A 100 UNITS injection   Commonly known as:  BOTOX   Dose:  200 Units   Quantity:  200 Units        Inject 200 Units into the muscle every 3 months   Refills:  3        * BOTOX IJ   Dose:  165 Units        Inject 165 Units into the muscle once Lot /C3 Exp 06/2020   Refills:  0        carbamide peroxide 6.5 % otic solution   Commonly known as:  DEBROX   Dose:  5 drop        5 drops 2 times daily   Refills:  0        clindamycin 300 MG capsule   Commonly known as:  CLEOCIN   Dose:  300 mg   Indication:  Infection of the Skin and/or Related Soft Tissue   Quantity:  28 capsule        Take 1 capsule (300 mg) by mouth 3 times daily Last dose 12/31/17.   Refills:  0        clobetasol 0.05 % cream   Commonly known as:  TEMOVATE   Quantity:  60 g        Apply topically 2 times daily   Refills:  0        Colchicine 0.6 MG Caps   Dose:  1 capsule   Quantity:  90 capsule        Take 0.6 mg by mouth See Admin Instructions 2 capsules in AM and 1 capsule in PM   Refills:  3        COMPOUNDED NON-CONTROLLED SUBSTANCE - PHARMACY TO MIX COMPOUNDED MEDICATION   Commonly known as:  CMPD RX   Quantity:  30 capsule        Take one capsule in the morning   Refills:  0        diclofenac 1 % Gel topical gel   Commonly known as:  VOLTAREN   Quantity:  100 g        Apply 2-4 grams to affected area(s) up to 4 times per day as needed. This is an anti-inflammatory medication.   Refills:  4        dicyclomine 20 MG tablet   Commonly known as:  BENTYL   Dose:  20 mg   Quantity:  60 tablet        Take 1 tablet (20 mg) by mouth 4 times daily as needed   Refills:  1        diltiazem 2% in PLO cream (FV  COMPOUNDED) 2% Gel   Quantity:  30 g        Apply small pea size amount three times daily to anus until pain is gone.   Refills:  1        diphenhydrAMINE 25 MG tablet   Commonly known as:  BENADRYL   Dose:  25 mg   Quantity:  30 tablet        Take 1 tablet (25 mg) by mouth every 8 hours as needed for allergies   Refills:  0        EPINEPHrine 0.3 MG/0.3ML injection 2-pack   Commonly known as:  EPIPEN 2-EAMON   Dose:  0.3 mg   Quantity:  0.6 mL        Inject 0.3 mLs (0.3 mg) into the muscle as needed for anaphylaxis   Refills:  3        guanFACINE 1 MG tablet   Commonly known as:  TENEX   Dose:  3 mg   Quantity:  270 tablet        Take 3 tablets (3 mg) by mouth 2 times daily   Refills:  3        hydrOXYzine 25 MG tablet   Commonly known as:  ATARAX   Dose:  25-50 mg   Quantity:  90 tablet        Take 1-2 tablets (25-50 mg) by mouth every 6 hours as needed for anxiety   Refills:  1        hyoscyamine 0.125 MG tablet   Commonly known as:  ANASPAZ/LEVSIN   Dose:  0.125-0.25 mg   Quantity:  40 tablet        Take 1-2 tablets (125-250 mcg) by mouth every 4 hours as needed for cramping   Refills:  1        hypromellose 0.3 % Soln ophthalmic solution   Commonly known as:  GENTEAL   Dose:  1 drop        1 drop every hour as needed for dry eyes   Refills:  0        LACTAID PO        Take by mouth daily   Refills:  0        lactulose 20 GM/30ML Soln   Dose:  30 mL   Quantity:  300 mL        Take 30 mLs by mouth 3 times daily as needed   Refills:  3        lidocaine 2 % topical gel   Commonly known as:  XYLOCAINE   Dose:  1 Tube   Indication:  Anesthesia to a Specific Part of the Body        Apply 1 Tube topically daily Reported on 4/21/2017   Refills:  0        lidocaine visc 2% 2.5mL/5mL & maalox/mylanta w/ simeth 2.5mL/5mL & diphenhydrAMINE 5mg/5mL Susp suspension   Commonly known as:  MAGIC Mouthwash [facility]OSPITAL   Dose:  10 mL   Quantity:  1 Bottle        Swish and swallow 10 mLs in mouth every 6 hours as needed for mouth sores  "  Refills:  1        linaclotide 290 MCG capsule   Commonly known as:  LINZESS   Dose:  290 mcg   Quantity:  90 capsule        Take 1 capsule (290 mcg) by mouth every morning (before breakfast)   Refills:  1        LORazepam 1 MG tablet   Commonly known as:  ATIVAN   Dose:  0.5 mg   Quantity:  60 tablet        Take 0.5 tablets (0.5 mg) by mouth 2 times daily as needed for other (atypical chest pain) Do not operate a vehicle after taking this medication   Refills:  0        magnesium citrate solution   Dose:  296 mL   Quantity:  296 mL        Take 296 mLs by mouth See Admin Instructions Refer to the \"Getting Ready for a Colonoscopy\" patient handout   Refills:  0        meclizine 25 MG tablet   Commonly known as:  ANTIVERT   Dose:  25 mg   Quantity:  30 tablet        Take 1 tablet (25 mg) by mouth every 6 hours as needed for dizziness   Refills:  1        omeprazole 40 MG capsule   Commonly known as:  priLOSEC   Dose:  40 mg   Quantity:  90 capsule        Take 1 capsule (40 mg) by mouth daily   Refills:  3        ondansetron 8 MG ODT tab   Commonly known as:  ZOFRAN-ODT   Dose:  8 mg   Quantity:  60 tablet        Take 1 tablet (8 mg) by mouth every 8 hours as needed for nausea   Refills:  6        PEG-KCl-NaCl-NaSulf-Na Asc-C 100 G Solr   Commonly known as:  MOVIPREP   Dose:  1 each   Quantity:  1 each        Take 1 each by mouth See Admin Instructions Refer to \"Getting Ready for a Colonoscopy\" patient handout   Refills:  0        prazosin 1 MG capsule   Commonly known as:  MINIPRESS   Dose:  1 mg   Quantity:  30 capsule        Take 1 capsule (1 mg) by mouth At Bedtime For nightmares.   Refills:  1        Simethicone 125 MG Caps   Dose:  125 mg   Quantity:  1 capsule        Take 125 mg by mouth See Admin Instructions Refer to \"Getting Ready for a Colonoscopy\" patient handout   Refills:  0        SPRINTEC 28 0.25-35 MG-MCG per tablet   Quantity:  84 tablet   Generic drug:  norgestimate-ethinyl estradiol        TAKE 1 " TABLET BY MOUTH ONCE DAILY.   Refills:  2        sucralfate 1 GM/10ML suspension   Commonly known as:  CARAFATE   Dose:  1 g   Quantity:  1200 mL        Take 10 mLs (1 g) by mouth 4 times daily   Refills:  2        triamcinolone 0.1 % cream   Commonly known as:  KENALOG   Quantity:  15 g        Apply sparingly to oral ulcers three times daily for 14 days as needed.   Refills:  1        * Notice:  This list has 2 medication(s) that are the same as other medications prescribed for you. Read the directions carefully, and ask your doctor or other care provider to review them with you.            Procedures and tests performed during your visit     CBC with platelets differential    CT Chest Abdomen Pelvis w/o Contrast    Comprehensive metabolic panel    Lactic acid whole blood    Lipase    Peripheral IV catheter    UA with Microscopic reflex to Culture    Vascular Access Care Adult IP Consult    hCG qual urine POCT      Orders Needing Specimen Collection     None      Pending Results     No orders found from 1/6/2018 to 1/9/2018.            Pending Culture Results     No orders found from 1/6/2018 to 1/9/2018.            Pending Results Instructions     If you had any lab results that were not finalized at the time of your Discharge, you can call the ED Lab Result RN at [phone]. You will be contacted by this team for any positive Lab results or changes in treatment. The nurses are available 7 days a week from 10A to 6:30P.  You can leave a message 24 hours per day and they will return your call.        Thank you for choosing [facility]       Thank you for choosing [facility] for your care. Our goal is always to provide you with excellent care. Hearing back from our patients is one way we can continue to improve our services. Please take a few minutes to complete the written survey that you may receive in the mail after you visit with us. Thank you!        [facility] Information     [facility] gives you secure access to your  electronic health record. If you see a primary care provider, you can also send messages to your care team and make appointments. If you have questions, please call your primary care clinic.  If you do not have a primary care provider, please call [phone] and they will assist you.        Care EveryWhere ID     This is your Care EveryWhere ID. This could be used by other organizations to access your [facility] medical records  [id number]        Equal Access to Services     NABIL GALEANO : Brandon Santiago, channing hurtado, clover juan, mike rodriguez . So [facility] [phone].    ATENCIÓN: Si habla español, tiene a [place] disposición servicios gratuitos de asistencia lingüística. [place] al [phone].    We comply with applicable federal civil rights laws and [place] laws. We do not discriminate on the basis of race, color, national origin, age, disability, sex, sexual orientation, or gender identity.            After Visit Summary       This is your record. Keep this with you and show to your community pharmacist(s) and doctor(s) at your next visit.

## 2018-01-09 ASSESSMENT — ENCOUNTER SYMPTOMS
TROUBLE SWALLOWING: 1
INCREASED ENERGY: 1
ABDOMINAL PAIN: 1
HYPERTENSION: 0
HOARSE VOICE: 1
SWOLLEN GLANDS: 1
EYE REDNESS: 1
DOUBLE VISION: 1
POLYPHAGIA: 0
ALTERED TEMPERATURE REGULATION: 1
EXERCISE INTOLERANCE: 0
SPEECH CHANGE: 0
SNORES LOUDLY: 0
MUSCLE WEAKNESS: 1
NAUSEA: 1
SKIN CHANGES: 0
CONSTIPATION: 1
SMELL DISTURBANCE: 1
FEVER: 1
JOINT SWELLING: 1
WHEEZING: 0
SINUS CONGESTION: 1
LEG PAIN: 0
DECREASED CONCENTRATION: 1
SHORTNESS OF BREATH: 1
ABDOMINAL PAIN: 1
CHILLS: 1
NECK MASS: 0
NECK PAIN: 1
BLOOD IN STOOL: 0
BOWEL INCONTINENCE: 0
COUGH DISTURBING SLEEP: 0
JAUNDICE: 0
MEMORY LOSS: 1
EYE PAIN: 1
MUSCLE CRAMPS: 1
PANIC: 1
DEPRESSION: 0
WEIGHT GAIN: 1
INSOMNIA: 1
PARALYSIS: 1
SPUTUM PRODUCTION: 0
STIFFNESS: 1
DECREASED APPETITE: 1
NAIL CHANGES: 1
WEAKNESS: 1
POOR WOUND HEALING: 0
HYPOTENSION: 1
POLYDIPSIA: 1
NUMBNESS: 1
TREMORS: 1
VOMITING: 1
SINUS PAIN: 1
SYNCOPE: 1
HEADACHES: 1
MUSCULOSKELETAL NEGATIVE: 1
SEIZURES: 0
COUGH: 0
RECTAL PAIN: 0
BACK PAIN: 1
FATIGUE: 1
TASTE DISTURBANCE: 1
SLEEP DISTURBANCES DUE TO BREATHING: 1
DIARRHEA: 1
NAUSEA: 1
DISTURBANCES IN COORDINATION: 1
HALLUCINATIONS: 0
RHINORRHEA: 1
DIARRHEA: 1
WEIGHT LOSS: 1
BRUISES/BLEEDS EASILY: 1
NERVOUS/ANXIOUS: 1
TINGLING: 1
POSTURAL DYSPNEA: 0
FEVER: 1
BLOATING: 1
NIGHT SWEATS: 1
DIZZINESS: 1
EYE IRRITATION: 1
LIGHT-HEADEDNESS: 1
LOSS OF CONSCIOUSNESS: 1
EYE WATERING: 1
ARTHRALGIAS: 1
DYSPNEA ON EXERTION: 0
BLOOD IN STOOL: 0
SORE THROAT: 0
ORTHOPNEA: 0
MYALGIAS: 1
HEMOPTYSIS: 0
HEARTBURN: 1
PALPITATIONS: 1
APPETITE CHANGE: 1

## 2018-01-09 NOTE — ED PROVIDER NOTES
History     Chief Complaint   Patient presents with     Abdominal Pain     HPI  Samara Oropeza is a 22 year old female who presents with fever.   Hx of recurrent abdominal pain and found to have a mobile cecum. Just a few days out from an outpatient upper endoscopy and colonoscopy for additional evaluation of her recurrent abdominal pain. Colonoscopy normal. Upper endoscopy with a single esophageal nodule removed.   Reports fever at home for 1-2 days and increased abdominal pain, raising concern for possible perforation.   + nausea, no vomiting.   No sore throat or cough but does have sinus congestion and runny nose.   No known sick contacts.   Last stool this AM, watery.    No urinary symptoms.   No chest pain or dyspnea.     I have reviewed the Medications, Allergies, Past Medical and Surgical History, and Social History in the flatev system.    Past Medical History:   Diagnosis Date     Anxiety      Arthritis      Behcet's disease (H)      Cervical adenitis May 2010     Chronic abdominal pain      Constipation, chronic 1994     Gastro-oesophageal reflux disease      Gastroparesis      Migraines      Neuromuscular disorder (H)     fibramyalgia     Palpitations      Seizure (H)      Seizures (H)     unknown etiology     Syncope      Tourette's        Past Surgical History:   Procedure Laterality Date     ARTHROSCOPY KNEE WITH PATELLAR REALIGNMENT  7/25/2013    Procedure: ARTHROSCOPY KNEE WITH PATELLAR REALIGNMENT;  Left Knee Arthroscopy, Medial Patellofemoral Ligament Reconstruction with Allograft  ;  Surgeon: Jennifer Acevedo MD;  Location: [facility]     COLONOSCOPY  2015     DENTAL SURGERY  1996    Teeth removal     ENDOSCOPY UPPER, COLONOSCOPY, COMBINED  2005     HC ESOPH/GAS REFLUX TEST W NASAL IMPED >1 HR N/A 2/15/2017    Procedure: ESOPHAGEAL IMPEDENCE FUNCTION TEST WITH 24 HOUR PH GREATER THAN 1 HOUR;  Surgeon: Timothy Matta MD;  Location:  GI       Family History   Problem Relation Age of  Onset     Depression Mother      Neurologic Disorder Mother      Migraines, take imitrex injection.  Also in maternal grandmother.       Alcohol/Drug Father      Hypertension Father      Depression Father      Cardiovascular Maternal Grandmother      Depression Maternal Grandmother      Hypertension Maternal Grandmother      Alzheimer Disease Maternal Grandmother      Cardiovascular Maternal Grandfather      Hypertension Maternal Grandfather      Depression Maternal Grandfather      Alcohol/Drug Maternal Grandfather      Cardiovascular Paternal Grandmother      Hypertension Paternal Grandmother      Cardiovascular Paternal Grandfather      Hypertension Paternal Grandfather      Glaucoma No family hx of      Macular Degeneration No family hx of        Social History   Substance Use Topics     Smoking status: Never Smoker     Smokeless tobacco: Never Used     Alcohol use 0.6 oz/week     1 Standard drinks or equivalent per week      Comment: MONTHLY       Review of Systems   Constitutional: Positive for appetite change and fever.   HENT: Positive for congestion and rhinorrhea.    Cardiovascular: Negative for chest pain and leg swelling.   Gastrointestinal: Positive for abdominal pain, diarrhea and nausea. Negative for blood in stool.   Genitourinary: Negative.    Musculoskeletal: Negative.    All other systems reviewed and are negative.        Physical Exam   BP: 135/75  Pulse: 115  Temp: 98.1  F (36.7  C)  Resp: 18  Weight: 69.3 kg (152 lb 12.5 oz)  SpO2: 100 %      Physical Exam  Gen:A&Ox3, reserved affect  HEENT:PERRL, no facial tenderness or wounds, head atraumatic, oropharynx clear  Back: no CVA tenderness  CV:RRR without murmurs  PULM:Clear to auscultation bilaterally, no chest wall crepitus  Abd:soft, mild generalized tenderness without rebound tenderness or guarding  UE:No traumatic injuries, skin normal  LE:no traumatic injuries, skin normal, no LE edema.   Neuro:CN II-XII intact, strength 5/5 throughout, gait  stable.   Skin: no rashes or ecchymoses      ED Course     ED Course     Procedures             Critical Care time:  none             Labs Ordered and Resulted from Time of ED Arrival Up to the Time of Departure from the ED   CBC WITH PLATELETS DIFFERENTIAL - Abnormal; Notable for the following:        Result Value    Hemoglobin 10.9 (*)     Hematocrit 34.4 (*)     All other components within normal limits   HCG QUAL URINE POCT - Abnormal; Notable for the following:    COMPREHENSIVE METABOLIC PANEL   LIPASE   LACTIC ACID WHOLE BLOOD   ROUTINE UA WITH MICROSCOPIC REFLEX TO CULTURE   PERIPHERAL IV CATHETER            Assessments & Plan (with Medical Decision Making)   21 yo F presenting with exacerbation of recurrent abdominal pain, but increased in intensity and with reports of fevers at home. REcent EGD and colonoscopy, raising concern for possible procedural complication.   Vitals notable for mild tachycardia with  on arrival. Afebrile.   IV access obtained and lab testing done.   bHCG negative  UA negative for UTI or hematuria.   CBC with WBC of 5.1. Hgb with stable anemia with Hgb 10.9. Plts normal.  CMP and lipase unremarkable.   Lactic acid 1.0.   CT CAP negative for signs of perforation or other acute abnormalities. Has dominant follicle in left adnexa.     Pt given reassurance and discharged home. Follow up with primary care. Likely vital illness causing fever and URI symptoms. Supportive care as usual.     I have reviewed the nursing notes.    I have reviewed the findings, diagnosis, plan and need for follow up with the patient.    Discharge Medication List as of 1/8/2018  5:54 PM          Final diagnoses:   Viral illness       1/8/2018   [facility], [place], EMERGENCY DEPARTMENT    MD GIANNI Taylor Katrina Anne, MD  01/09/18 [id number]

## 2018-01-10 ENCOUNTER — OFFICE VISIT (OUTPATIENT)
Dept: GASTROENTEROLOGY | Facility: CLINIC | Age: 23
End: 2018-01-10
Payer: COMMERCIAL

## 2018-01-10 VITALS
HEIGHT: 63 IN | HEART RATE: 121 BPM | DIASTOLIC BLOOD PRESSURE: 82 MMHG | BODY MASS INDEX: 26.58 KG/M2 | SYSTOLIC BLOOD PRESSURE: 109 MMHG | WEIGHT: 150 LBS | TEMPERATURE: 99.2 F

## 2018-01-10 DIAGNOSIS — Q43.3 MOBILE CECUM (H): ICD-10-CM

## 2018-01-10 DIAGNOSIS — M35.2 BEHCET'S DISEASE (H): ICD-10-CM

## 2018-01-10 DIAGNOSIS — R10.9 ABDOMINAL CRAMPING: ICD-10-CM

## 2018-01-10 DIAGNOSIS — R29.90 NEUROLOGICAL SYMPTOMS: ICD-10-CM

## 2018-01-10 DIAGNOSIS — K59.04 CHRONIC IDIOPATHIC CONSTIPATION: Primary | ICD-10-CM

## 2018-01-10 RX ORDER — DICYCLOMINE HCL 20 MG
20 TABLET ORAL 4 TIMES DAILY PRN
Qty: 120 TABLET | Refills: 3 | Status: SHIPPED | OUTPATIENT
Start: 2018-01-10 | End: 2019-01-07

## 2018-01-10 RX ORDER — COLCHICINE 0.6 MG/1
TABLET ORAL
Refills: 1 | COMMUNITY
Start: 2017-12-23 | End: 2018-01-16

## 2018-01-10 ASSESSMENT — ENCOUNTER SYMPTOMS
EYE REDNESS: 1
BOWEL INCONTINENCE: 0
SORE THROAT: 1
DIARRHEA: 1
MYALGIAS: 1
BLOOD IN STOOL: 0
EYE IRRITATION: 1
LOSS OF CONSCIOUSNESS: 1
NIGHT SWEATS: 1
EXERCISE INTOLERANCE: 1
CHILLS: 1
PALPITATIONS: 1
ORTHOPNEA: 1
NECK PAIN: 1
COUGH: 0
ALTERED TEMPERATURE REGULATION: 1
WEAKNESS: 1
HYPOTENSION: 1
SHORTNESS OF BREATH: 1
LEG PAIN: 1
MUSCLE WEAKNESS: 1
INSOMNIA: 1
NUMBNESS: 1
MUSCLE CRAMPS: 1
SINUS PAIN: 1
SPUTUM PRODUCTION: 0
TINGLING: 1
SEIZURES: 0
DECREASED APPETITE: 1
VOMITING: 1
TREMORS: 1
BLOATING: 1
DYSPNEA ON EXERTION: 1
DISTURBANCES IN COORDINATION: 1
HOARSE VOICE: 1
NAUSEA: 1
POLYPHAGIA: 0
NAIL CHANGES: 1
HALLUCINATIONS: 0
LIGHT-HEADEDNESS: 1
SLEEP DISTURBANCES DUE TO BREATHING: 1
FEVER: 1
PANIC: 0
SNORES LOUDLY: 0
DEPRESSION: 0
PARALYSIS: 1
SKIN CHANGES: 0
DIZZINESS: 1
WEIGHT GAIN: 1
FATIGUE: 1
COUGH DISTURBING SLEEP: 0
SMELL DISTURBANCE: 1
TROUBLE SWALLOWING: 1
DOUBLE VISION: 1
JOINT SWELLING: 1
SYNCOPE: 1
INCREASED ENERGY: 1
EYE WATERING: 1
HEADACHES: 1
NERVOUS/ANXIOUS: 1
POLYDIPSIA: 1
ABDOMINAL PAIN: 1
BACK PAIN: 1
CONSTIPATION: 1
BRUISES/BLEEDS EASILY: 0
SINUS CONGESTION: 1
EYE PAIN: 1
RECTAL PAIN: 0
HEMOPTYSIS: 0
POOR WOUND HEALING: 1
SPEECH CHANGE: 1
HEARTBURN: 1
SWOLLEN GLANDS: 1
WHEEZING: 0
POSTURAL DYSPNEA: 0
DECREASED CONCENTRATION: 1
TASTE DISTURBANCE: 1
JAUNDICE: 0
HYPERTENSION: 1
ARTHRALGIAS: 1
MEMORY LOSS: 1
NECK MASS: 0
WEIGHT LOSS: 1
STIFFNESS: 1

## 2018-01-10 NOTE — LETTER
"1/10/2018       RE: Samara Oropeza  [address]   [place] [address]     Dear Colleague,    Thank you for referring your patient, Samara Oropeza, to the [facility] at [facility]. Please see a copy of my visit note below.    CHIEF COMPLAINT:  Severe abdominal pain, followup.      HISTORY OF PRESENT ILLNESS:  Samara is a 22-year-old woman last seen by myself on 10/11/2017 after which she had CT showing a mobile cecum for which I referred her to surgery, Dr. Mojica, who then discussed it with Dr. Joel, who referred her for upper GI endoscopy and colonoscopy completed by Dr. Rollins 01/03.  She had fever, diarrhea, and severe abdominal pain over the weekend for which she went to the ER on 01/08 with normalized temperature finding, CT with no new issue, essentially normal laboratory studies with stable low hemoglobin.     Samara believes she is constipated at this time, but has had \"diarrhea none today, twice yesterday.\"  The diarrhea was not watery yesterday , but rather mushy.  Pain is epigastric to left upper quadrant and she has mistakenly attributed this to her mobile cecum.  She describes a fever of 101 again today at 4:30 a.m. when she was awakened by pain, not using electric blanket or her heating pad.      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  She has had extensive testing 2015 and before demonstrating no Behcet's in the GI tract mucosa. 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 Samara had admission 12/18 to 12/22 for neurologic spells of a couple types including convulsive and syncopal spells and staring spells.  For this, she had 3-day video EEG recording.  No specific neurologic findings were reported initially; tilt test was negative but terminated prematurely and a possible psychogenic component was noted. .      Samara has used Linzess intermittently because it causes " diarrhea when used daily.  In the past, she has had Dicyclomine 20 mg which gave benefit.  She has known irritable bowel syndrome, severe constipation if she does not treat it, occasional vomiting when she is severely constipated, other times with severe abdominal pain, vomiting, some urgent stool lasting briefly but pain resolved prior to CTs done on almost every occasion.  Prior to the CT of 11/03/2017 when the cecum in the left upper quadrant, she had CT 04/2017 with mural stratification in the ascending colon soon after one of her more severe pain episodes and which could be seen after mobile cecum had returned to normal anatomic position.      Samara has been treating her constipation more regularly since they moved into their own location end of October.  However, during a December hospitalization (of four days), she did not have stool for a total of 9 days, and knowing that she would be home in 2 days she declined Linzess the last 2 days.  She takes her Linzess about every other day in the morning and exercises to aid any possible evacuation thereafter.     Samara is here with her mother, and they are exceedingly frustrated.  Samara continues to have fairly constant abdominal pain primarily above the waist and to the mid and left abdomen.  She was attributing this pain to her mobile cecum, though has also had severe pain at times with CTs not showing the cecum out of place or with any telltale signs.  However, most CTs have been done after resolution of the more severe pain.      Samara and her mother also seemed to believe that diarrhea on an ongoing basis would be caused by the mobile cecum rather than it being caused by acute illness or something else.  Samaar reports having had fever earlier today again at 101 degrees.  That was at 4:38 a.m. with neither electric blanket nor any unusual heat in the room.  She has not yet had diarrhea today, though she did yesterday, 2-4 times, less  "than previously. This was not watery but mushy. The fever has been going on since 01/05.      Samara speaks of being able to eat only once daily but is able to additionally have an Atkins protein shake, once each morning with half of an Apple with no skin or with some strawberry and some additional nutrition which she does not count as a meal. Her weight has not fallen.     Samara was quite surprised that she had only a fair prep at colonoscopy 1/3/2018 which was only good after extensive washing, as she had done what she considered a very huge prep.  She used MiraLax, Linzess, magnesium citrate, lactulose.    OBJECTIVE: /82  Pulse 121  Temp 99.2  F (37.3  C) (Oral)  Ht 1.6 m (5' 3\")  Wt 68 kg (150 lb)  LMP 01/07/2018  BMI 26.57 kg/m2   Clean young woman here with her mother. Affect is flat.   Breathing is calm and grossly normal.  Abdomen is soft, tender mid and LUQ. Examined only in the chair.    RESULTS:  1/3/2018  The colonoscopy was visually normal.  Upper endoscopy on the same date showed possible 4 mm nodule in the upper esophagus and was otherwise visually normal with no cause for pain seen.  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   Note CT 11/3/2017 with mobile cecum. 4/2017 with mural stratification of ascending colon.     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  Dr. Flores is their neurologist here, and they are not sure whether he himself has the know-how or tools to diagnose that and are wondering whether she should be referred to the [facility] for this.      Samara has a number of GI distresses.  We have not found any diagnosis for a number of them, though a number seem to be functional irritable bowel syndrome, primarily with constipation. She has had several studies in the past documenting that she does not have Behcet's in be intestinal mucosa.   She " has had pain at a level I can believe could be associated with mobile cecum, though not today's pain, it seems.     PLAN:   1.  Abdominal x-ray ordered to be available to her at a time she has an intense abdominal pain to see whether she has cecum in the left upper quadrant at that time. [They are frustrated at ER waits. They agree to go to ER if there is more than the pain going on .]  2.  Follow through with the upper GI small bowel follow-through ordered by Dr. Mojica or Dr. Coburn.   3.  When the diarrhea that is no longer present, return to her program using linaclotide and dicyclomine as needed for abdominal cramping, cautioned against constipation.   4.  I will send a message to Dr. Flores regarding their questions regarding him or referral to the [facility] for diagnosis of possible neurologic Behcet's.   5.  She has many episodes of pain with vomiting which sometime appear to occur after severe constipation which also may release as an urgent, loose or watery stool.  Thus, it is confusing and we cannot say which more painful episodes might have been with the cecum in the left upper quadrant.  Note, however, that cecum in the left upper quadrant would rarely cause pain in that location as nerves are familiar with being on the right lower quadrant, so people often even perceive the pain as being vague or mid or right abdominal when the cecum is misplaced.   6.  Soluble fiber is also recommended for improved stool form and stabilization of her stool form, improved bulk and less mush, less skinny as it tends towards looser.   7.  She is advised to call as needed.   8.  Return to GI Clinic: She is scheduled for next week with her new GI provider, Dr. Estela Cuenca.    We discussed the history/results, assessment and plan. Questions were answered. Written notes were provided.       Total visit 25 minutes with counseling time 15 minutes.         EVI DE LA FUENTE CNP         D: 01/10/2018 13:24   T:  2018 06:08   MT: ABILIO   Document: [id number]      D: 01/15/2018 22:25   T: 2018 08:25   MT: LEIA   Name:     LUCINA BAH   MRN:      [id number]        Account:      [id number]   :      1995           Service Date: 01/10/2018   Document: [id number]         EVI DE LA FUENTE CNP               .

## 2018-01-10 NOTE — MR AVS SNAPSHOT
"              After Visit Summary   1/10/2018    Samara Oropeza    MRN: [id number]           Patient Information     Date Of Birth          1995        Visit Information        Provider Department      1/10/2018 12:00 PM Kacie Almeida, EVI [facility]        Today's Diagnoses     Chronic idiopathic constipation    -  1    Mobile cecum        Behcet's disease (H)        Neurological symptoms        Abdominal cramping          Care Instructions    You have many episodes of pain with vomiting .    Sometimes it is after severe constipation.  This may also cause urgent loose stool, watery or mushy.     Soluble fiber sops up water and gives soft stool and formed stool.    This also supports healthy gut bacteria.    Is a prebiotic that supports the \"pro-biotics\"  Psyllium (Metamucil) is most natural.   Powder - mix and drink immediately, -  or use the capsules or tablets.  or  Wheat dextrin (Benefiber) which has no taste or texture.   Available as powder, capsule, chewable.  When in doubt, return to powder.  If using the fiber discussed in clinic, start as discussed in clinic.  Or: Take the dose on the label.  If it causes distress, start at half the dose and work up to full dose.  If you have no improvement after two weeks, increase the dose and be sure to use it twice daily.  You may feel bloat at the beginning.   Improvement comes gradually.  Continue this regularly for a couple months before deciding whether it is helpful for you.    Call as needed      [facility]     Until February 22, 2018 [facility]   For follow-up appointments call Malini [phone]  For GI Nurse Triage  [phone], then, \"For Medical Questions, option 3\"                Follow-ups after your visit        Your next 10 appointments already scheduled     Wilder 15, 2018  9:00 AM CST   XR UPPER GI W SMALL BOWEL FOLLOW THROUGH with [id number], UC GIGU [facility] " [place] ([facility] [place])    [address] [place] [id number] [phone]           Please bring a list of your current medicines to your exam. (Include vitamins, minerals and over-the-counter medicines.) Leave your valuables at home.  Tell the doctor if there is a chance you could be pregnant.  If you had a barium enema within two days of the exam, you will need to take 3 bisacodyl (Dulcolax) tablets the day before your exam.  Do not eat, chew gum, or smoke for 8 hours before your exam. Keep drinking clear liquids until 2 hours before the exam. Clear liquids include water, clear juice, black coffee or clear tea without milk, Gatorade, or clear soda.  Take your usual medicines unless your doctor tells you not to. Take them with small sips of water.  Please call the Imaging Department at your exam site with any questions.            Jan 16, 2018 11:30 AM CST   [facility]en [facility] MD   [facility]    [address] [place] [id number] [phone]            Jan 17, 2018  2:20 PM CST   (Arrive by 2:05 PM)   New Patient Visit with Estela Cuenca MD   [facility] ([facility])    [address] [place] [address]   [phone]            Jan 24, 2018  2:00 PM CST   (Arrive by 1:45 PM)   Return Visit with EVI Vizcaino CNP   [facility] ([facility])    [address] [place] [address]   [phone]            Jan 29, 2018  8:30 AM CST   (Arrive by 8:15 AM)   New Patient Visit with Vitor Coburn MD   [facility] ([facility])    [address] [place] [address]   [phone]            Feb 13, 2018  2:30 PM CST   (Arrive by 2:15  PM)   Return Seizure with Sigifredo Flores MD   [facility] ([facility])    [address]  3rd Floor  [facility] [id number] [phone]            Feb 27, 2018 10:15 AM CST   Return Visit with Cata Hurst NP   [facility] ([facility])    [address] [place]  [address]  [facility] [id number] [phone]            Feb 28, 2018  3:00 PM CST   (Arrive by 2:45 PM)   Return Botox with Frederick Bender MD   [facility] ([facility])    [address]  3rd Floor  [facility] [id number] [phone]              Future tests that were ordered for you today     Open Future Orders        Priority Expected Expires Ordered    XR Abdomen 2 Views Routine 1/10/2018 1/11/2019 1/10/2018            Who to contact     Please call your clinic at [phone] to:    Ask questions about your health    Make or cancel appointments    Discuss your medicines    Learn about your test results    Speak to your doctor   If you have compliments or concerns about an experience at your clinic, or if you wish to file a complaint, please contact [facility] Patient Relations at [phone] or email us at [email or web address]         Additional Information About Your Visit        MyChart Information     anywayanydayt gives you secure access to your electronic health record. If you see a primary care provider, you can also send messages to your care team and make appointments. If you have questions, please call your primary care clinic.  If you do not have a primary care provider, please call [phone] and they will assist you.      [employer] is an electronic gateway that provides easy, online access to your medical records. With [employer], you can request a clinic appointment, read your test results, renew a prescription or communicate with your care  "team.     To access your existing account, please contact your [facility] or call [phone] for assistance.        Care EveryWhere ID     This is your Care EveryWhere ID. This could be used by other organizations to access your [place] medical records  [id number]        Your Vitals Were     Pulse Temperature Height Last Period BMI (Body Mass Index)       121 99.2  F (37.3  C) (Oral) 1.6 m (5' 3\") 01/07/2018 26.57 kg/m2        Blood Pressure from Last 3 Encounters:   01/10/18 109/82   01/08/18 135/75   12/22/17 115/69    Weight from Last 3 Encounters:   01/10/18 68 kg (150 lb)   01/08/18 69.3 kg (152 lb 12.5 oz)   12/20/17 68.9 kg (152 lb)                 Today's Medication Changes          These changes are accurate as of: 1/10/18  1:12 PM.  If you have any questions, ask your nurse or doctor.               These medicines have changed or have updated prescriptions.        Dose/Directions    apremilast 30 MG tablet   Commonly known as:  OTEZLA   This may have changed:    - how much to take  - when to take this  - additional instructions   Used for:  Behcet's disease (H)        30mg twice daily.   Quantity:  180 tablet   Refills:  0       linaclotide 145 MCG capsule   Commonly known as:  LINZESS   This may have changed:    - medication strength  - how much to take   Used for:  Chronic idiopathic constipation   Changed by:  Kacie Almeida APRN CNP        Dose:  145 mcg   Take 1 capsule (145 mcg) by mouth every morning (before breakfast)   Quantity:  90 capsule   Refills:  1            Where to get your medicines      These medications were sent to [place] [address] [place] [id number]  [address] [facility] [address]     Phone:  [phone]     dicyclomine 20 MG tablet    linaclotide 145 MCG capsule                Primary Care Provider Office Phone # Fax #    EVI Cardona [phone]       [address] " [place] [address]        Equal Access to Services     [facility]: Hadii aad ku hadprimoo Pamela, waaxda luqadaha, qaybta kaalmada timshonamariya, mike brad rodriguezallenchante parsons. So [facility] [phone].    ATENCIÓN: Si habla español, tiene a livingston disposición servicios gratuitos de asistencia lingüística. Jesus al [phone].    We comply with applicable federal civil rights laws and [place] laws. We do not discriminate on the basis of race, color, national origin, age, disability, sex, sexual orientation, or gender identity.            Thank you!     Thank you for choosing [facility]  for your care. Our goal is always to provide you with excellent care. Hearing back from our patients is one way we can continue to improve our services. Please take a few minutes to complete the written survey that you may receive in the mail after your visit with us. Thank you!             Your Updated Medication List - Protect others around you: Learn how to safely use, store and throw away your medicines at www.disposemymeds.org.          This list is accurate as of: 1/10/18  1:12 PM.  Always use your most recent med list.                   Brand Name Dispense Instructions for use Diagnosis    albuterol 108 (90 BASE) MCG/ACT Inhaler    PROAIR HFA/PROVENTIL HFA/VENTOLIN HFA    1 Inhaler    Inhale 2 puffs into the lungs every 6 hours as needed for shortness of breath / dyspnea or wheezing    Atypical chest pain       amitriptyline 50 MG tablet    ELAVIL    30 tablet    Take 1 tablet (50 mg) by mouth At Bedtime    Abdominal pain, generalized, Insomnia due to medical condition       apremilast 30 MG tablet    OTEZLA    180 tablet    30mg twice daily.    Behcet's disease (H)       ASPIRIN CHILDRENS 81 MG chewable tablet   Generic drug:  aspirin      Take 81 mg by mouth as needed        aspirin-acetaminophen-caffeine 250-250-65 MG per tablet    EXCEDRIN MIGRAINE     Take 1 tablet by mouth every 6 hours as  needed for headaches        betamethasone valerate 0.1 % cream    VALISONE     Apply topically 2 times daily        * botulinum toxin type A 100 UNITS injection    BOTOX    200 Units    Inject 200 Units into the muscle every 3 months    Cervical dystonia       * BOTOX IJ      Inject 165 Units into the muscle once Lot /C3 Exp 06/2020        carbamide peroxide 6.5 % otic solution    DEBROX     5 drops 2 times daily        clobetasol 0.05 % cream    TEMOVATE    60 g    Apply topically 2 times daily    Folliculitis       * Colchicine 0.6 MG Caps     90 capsule    Take 0.6 mg by mouth See Admin Instructions 2 capsules in AM and 1 capsule in PM    Behcet's syndrome (H)       * colchicine 0.6 MG tablet      TAKE 2 TABLETS BY MOUTH EVERY MORNING AND 1 TABLET EVERY EVENING.        COMPOUNDED NON-CONTROLLED SUBSTANCE - PHARMACY TO MIX COMPOUNDED MEDICATION    CMPD RX    30 capsule    Take one capsule in the morning    Fibromyalgia       diclofenac 1 % Gel topical gel    VOLTAREN    100 g    Apply 2-4 grams to affected area(s) up to 4 times per day as needed. This is an anti-inflammatory medication.    Polyarthralgia       dicyclomine 20 MG tablet    BENTYL    120 tablet    Take 1 tablet (20 mg) by mouth 4 times daily as needed    Abdominal cramping       diltiazem 2% in PLO cream (FV COMPOUNDED) 2% Gel     30 g    Apply small pea size amount three times daily to anus until pain is gone.    Anal fissure       diphenhydrAMINE 25 MG tablet    BENADRYL    30 tablet    Take 1 tablet (25 mg) by mouth every 8 hours as needed for allergies        EPINEPHrine 0.3 MG/0.3ML injection 2-pack    EPIPEN 2-EAMON    0.6 mL    Inject 0.3 mLs (0.3 mg) into the muscle as needed for anaphylaxis    Hx of bee sting allergy       guanFACINE 1 MG tablet    TENEX    270 tablet    Take 3 tablets (3 mg) by mouth 2 times daily    Tic       hydrOXYzine 25 MG tablet    ATARAX    90 tablet    Take 1-2 tablets (25-50 mg) by mouth every 6 hours as needed  "for anxiety    TAHIR (generalized anxiety disorder)       hyoscyamine 0.125 MG tablet    ANASPAZ/LEVSIN    40 tablet    Take 1-2 tablets (125-250 mcg) by mouth every 4 hours as needed for cramping    Abdominal pain, generalized       hypromellose 0.3 % Soln ophthalmic solution    GENTEAL     1 drop every hour as needed for dry eyes        LACTAID PO      Take by mouth daily        lactulose 20 GM/30ML Soln     300 mL    Take 30 mLs by mouth 3 times daily as needed    Constipation, unspecified constipation type       lidocaine 2 % topical gel    XYLOCAINE     Apply 1 Tube topically daily Reported on 4/21/2017        lidocaine visc 2% 2.5mL/5mL & maalox/mylanta w/ simeth 2.5mL/5mL & diphenhydrAMINE 5mg/5mL Susp suspension    [facility]    1 Bottle    Swish and swallow 10 mLs in mouth every 6 hours as needed for mouth sores    Behcet's syndrome (H)       linaclotide 145 MCG capsule    LINZESS    90 capsule    Take 1 capsule (145 mcg) by mouth every morning (before breakfast)    Chronic idiopathic constipation       LORazepam 1 MG tablet    ATIVAN    60 tablet    Take 0.5 tablets (0.5 mg) by mouth 2 times daily as needed for other (atypical chest pain) Do not operate a vehicle after taking this medication    Chronic abdominal pain       magnesium citrate solution     296 mL    Take 296 mLs by mouth See Admin Instructions Refer to the \"Getting Ready for a Colonoscopy\" patient handout    Rectal bleeding       meclizine 25 MG tablet    ANTIVERT    30 tablet    Take 1 tablet (25 mg) by mouth every 6 hours as needed for dizziness    Nausea       omeprazole 40 MG capsule    priLOSEC    90 capsule    Take 1 capsule (40 mg) by mouth daily    Gastroesophageal reflux disease, esophagitis presence not specified       ondansetron 8 MG ODT tab    ZOFRAN-ODT    60 tablet    Take 1 tablet (8 mg) by mouth every 8 hours as needed for nausea    Non-intractable vomiting with nausea, unspecified vomiting type, Hematemesis, " "presence of nausea not specified       PEG-KCl-NaCl-NaSulf-Na Asc-C 100 G Solr    MOVIPREP    1 each    Take 1 each by mouth See Admin Instructions Refer to \"Getting Ready for a Colonoscopy\" patient handout    Rectal bleeding       prazosin 1 MG capsule    MINIPRESS    30 capsule    Take 1 capsule (1 mg) by mouth At Bedtime For nightmares.    PTSD (post-traumatic stress disorder)       Simethicone 125 MG Caps     1 capsule    Take 125 mg by mouth See Admin Instructions Refer to \"Getting Ready for a Colonoscopy\" patient handout    Rectal bleeding       SPRINTEC 28 0.25-35 MG-MCG per tablet   Generic drug:  norgestimate-ethinyl estradiol     84 tablet    TAKE 1 TABLET BY MOUTH ONCE DAILY.    General counseling for prescription of oral contraceptives       sucralfate 1 GM/10ML suspension    CARAFATE    1200 mL    Take 10 mLs (1 g) by mouth 4 times daily    Bile reflux gastritis, Nausea       triamcinolone 0.1 % cream    KENALOG    15 g    Apply sparingly to oral ulcers three times daily for 14 days as needed.    Behcet's syndrome (H)       * Notice:  This list has 4 medication(s) that are the same as other medications prescribed for you. Read the directions carefully, and ask your doctor or other care provider to review them with you.      "

## 2018-01-10 NOTE — NURSING NOTE
"Chief Complaint   Patient presents with     RECHECK     abdominal pain, diarrhea       Vitals:    01/10/18 1215   BP: 109/82   Pulse: 121   Temp: 99.2  F (37.3  C)   TempSrc: Oral   Weight: 68 kg (150 lb)   Height: 1.6 m (5' 3\")       Body mass index is 26.57 kg/(m^2).      Patient Active Problem List   Diagnosis     Tics - Tourette syndrome     IBS (irritable bowel syndrome)     Seasonal allergic rhinitis     TAHIR (generalized anxiety disorder)     Knee pain     Concussion     Milk protein intolerance     Headaches due to old head injury     Behcet's disease (H)     Migraine     Spell of shaking     On colchicine therapy     Palpitations     Fibromyalgia     Rheumatoid arthritis of multiple sites without rheumatoid factor (H)     Raynaud's disease without gangrene     Chronic abdominal pain     Patellofemoral instability     PTSD (post-traumatic stress disorder)     Cervical dystonia     Acute left ankle pain     Cervical pain     Major depressive disorder, recurrent episode, moderate (H)     Chronic pain syndrome     Convulsions, unspecified convulsion type (H)     Transient alteration of awareness     Vitamin D deficiency     Mobile cecum     Spells of decreased attentiveness     Current Outpatient Prescriptions   Medication Sig Dispense Refill     colchicine 0.6 MG tablet TAKE 2 TABLETS BY MOUTH EVERY MORNING AND 1 TABLET EVERY EVENING.  1     Simethicone 125 MG CAPS Take 125 mg by mouth See Admin Instructions Refer to \"Getting Ready for a Colonoscopy\" patient handout 1 capsule 0     PEG-KCl-NaCl-NaSulf-Na Asc-C (MOVIPREP) 100 G SOLR Take 1 each by mouth See Admin Instructions Refer to \"Getting Ready for a Colonoscopy\" patient handout 1 each 0     magnesium citrate solution Take 296 mLs by mouth See Admin Instructions Refer to the \"Getting Ready for a Colonoscopy\" patient handout 296 mL 0     aspirin-acetaminophen-caffeine (EXCEDRIN MIGRAINE) 250-250-65 MG per tablet Take 1 tablet by mouth every 6 hours as needed " for headaches       SPRINTEC 28 0.25-35 MG-MCG per tablet TAKE 1 TABLET BY MOUTH ONCE DAILY. 84 tablet 2     hydrOXYzine (ATARAX) 25 MG tablet Take 1-2 tablets (25-50 mg) by mouth every 6 hours as needed for anxiety 90 tablet 1     prazosin (MINIPRESS) 1 MG capsule Take 1 capsule (1 mg) by mouth At Bedtime For nightmares. 30 capsule 1     OnabotulinumtoxinA (BOTOX IJ) Inject 165 Units into the muscle once Lot /C3  Exp 06/2020       apremilast (OTEZLA) 30 MG tablet 30mg twice daily. (Patient taking differently: 30 mg 2 times daily 30mg twice daily.) 180 tablet 0     guanFACINE (TENEX) 1 MG tablet Take 3 tablets (3 mg) by mouth 2 times daily 270 tablet 3     diltiazem 2% in PLO cream, FV COMPOUNDED, 2% GEL Apply small pea size amount three times daily to anus until pain is gone. 30 g 1     COMPOUNDED NON-CONTROLLED SUBSTANCE (CMPD RX) - PHARMACY TO MIX COMPOUNDED MEDICATION Take one capsule in the morning 30 capsule 0     amitriptyline (ELAVIL) 50 MG tablet Take 1 tablet (50 mg) by mouth At Bedtime 30 tablet 11     lactulose 20 GM/30ML SOLN Take 30 mLs by mouth 3 times daily as needed 300 mL 3     LORazepam (ATIVAN) 1 MG tablet Take 0.5 tablets (0.5 mg) by mouth 2 times daily as needed for other (atypical chest pain) Do not operate a vehicle after taking this medication 60 tablet 0     ondansetron (ZOFRAN-ODT) 8 MG ODT tab Take 1 tablet (8 mg) by mouth every 8 hours as needed for nausea 60 tablet 6     Colchicine 0.6 MG CAPS Take 0.6 mg by mouth See Admin Instructions 2 capsules in AM and 1 capsule in PM 90 capsule 3     diphenhydrAMINE (BENADRYL) 25 MG tablet Take 1 tablet (25 mg) by mouth every 8 hours as needed for allergies 30 tablet 0     linaclotide (LINZESS) 290 MCG capsule Take 1 capsule (290 mcg) by mouth every morning (before breakfast) (Patient taking differently: Take 290 mcg by mouth every other day Before breakfast) 90 capsule 1     sucralfate (CARAFATE) 1 GM/10ML suspension Take 10 mLs (1 g) by  mouth 4 times daily 1200 mL 2     diclofenac (VOLTAREN) 1 % GEL topical gel Apply 2-4 grams to affected area(s) up to 4 times per day as needed. This is an anti-inflammatory medication. 100 g 4     aspirin (ASPIRIN CHILDRENS) 81 MG chewable tablet Take 81 mg by mouth as needed        dicyclomine (BENTYL) 20 MG tablet Take 1 tablet (20 mg) by mouth 4 times daily as needed 60 tablet 1     omeprazole (PRILOSEC) 40 MG capsule Take 1 capsule (40 mg) by mouth daily 90 capsule 3     EPINEPHrine (EPIPEN 2-EAMON) 0.3 MG/0.3ML injection Inject 0.3 mLs (0.3 mg) into the muscle as needed for anaphylaxis 0.6 mL 3     albuterol (PROAIR HFA/PROVENTIL HFA/VENTOLIN HFA) 108 (90 BASE) MCG/ACT Inhaler Inhale 2 puffs into the lungs every 6 hours as needed for shortness of breath / dyspnea or wheezing 1 Inhaler 1     meclizine (ANTIVERT) 25 MG tablet Take 1 tablet (25 mg) by mouth every 6 hours as needed for dizziness 30 tablet 1     Magic Mouthwash (FV std formula) lidocaine visc 2% 2.5mL/5mL & maalox/mylanta w/ simeth 2.5mL/5mL & diphenhydrAMINE 5mg/5mL Swish and swallow 10 mLs in mouth every 6 hours as needed for mouth sores 1 Bottle 1     clobetasol (TEMOVATE) 0.05 % cream Apply topically 2 times daily 60 g 0     botulinum toxin type A (BOTOX) 100 UNITS injection Inject 200 Units into the muscle every 3 months 200 Units 3     hyoscyamine (ANASPAZ,LEVSIN) 0.125 MG tablet Take 1-2 tablets (125-250 mcg) by mouth every 4 hours as needed for cramping 40 tablet 1     hypromellose (GENTEAL) 0.3 % SOLN 1 drop every hour as needed for dry eyes        betamethasone valerate (VALISONE) 0.1 % cream Apply topically 2 times daily       carbamide peroxide (DEBROX) 6.5 % otic solution 5 drops 2 times daily       Lactase (LACTAID PO) Take by mouth daily       lidocaine (XYLOCAINE) 2 % jelly Apply 1 Tube topically daily Reported on 4/21/2017       triamcinolone (KENALOG) 0.1 % cream Apply sparingly to oral ulcers three times daily for 14 days as  needed. 15 g 1         History   Smoking Status     Never Smoker   Smokeless Tobacco     Never Used       Signed By:  Ela Isaacs; January 10, 2018; 12:21 PM )

## 2018-01-10 NOTE — PATIENT INSTRUCTIONS
"You have many episodes of pain with vomiting .    Sometimes it is after severe constipation.  This may also cause urgent loose stool, watery or mushy.     Soluble fiber sops up water and gives soft stool and formed stool.    This also supports healthy gut bacteria.    Is a prebiotic that supports the \"pro-biotics\"  Psyllium (Metamucil) is most natural.   Powder - mix and drink immediately, -  or use the capsules or tablets.  or  Wheat dextrin (Benefiber) which has no taste or texture.   Available as powder, capsule, chewable.  When in doubt, return to powder.  If using the fiber discussed in clinic, start as discussed in clinic.  Or: Take the dose on the label.  If it causes distress, start at half the dose and work up to full dose.  If you have no improvement after two weeks, increase the dose and be sure to use it twice daily.  You may feel bloat at the beginning.   Improvement comes gradually.  Continue this regularly for a couple months before deciding whether it is helpful for you.    Call as needed      [facility]     Until February 22, 2018 [facility]   For follow-up appointments call [facility] [phone]  For GI Nurse Triage  [phone], then, \"For Medical Questions, option 3\"        "

## 2018-01-11 ENCOUNTER — TELEPHONE (OUTPATIENT)
Dept: GASTROENTEROLOGY | Facility: CLINIC | Age: 23
End: 2018-01-11

## 2018-01-12 NOTE — TELEPHONE ENCOUNTER
Central Prior Authorization Team   Phone: [phone]      PA Initiation    Medication: linaclotide (LINZESS) 145 MCG- PA Initiated  Insurance Company: [employer] - Phone [phone] Fax [phone]  Pharmacy Filling the Rx: [id number] [place] [facility] [address] [place] [address]  Filling Pharmacy Phone: [phone]  Filling Pharmacy Fax: [phone]  Start Date: 1/12/2018

## 2018-01-12 NOTE — PROGRESS NOTES
"CHIEF COMPLAINT:  Severe abdominal pain, followup.      HISTORY OF PRESENT ILLNESS:  Samara is a 22-year-old woman last seen by myself on 10/11/2017 after which she had CT showing a mobile cecum for which I referred her to surgery, Dr. Mojica, who then discussed it with Dr. Joel, who referred her for upper GI endoscopy and colonoscopy completed by Dr. Rollins 01/03.  She had fever, diarrhea, and severe abdominal pain over the weekend for which she went to the ER on 01/08 with normalized temperature finding, CT with no new issue, essentially normal laboratory studies with stable low hemoglobin.     Samara believes she is constipated at this time, but has had \"diarrhea none today, twice yesterday.\"  The diarrhea was not watery yesterday , but rather mushy.  Pain is epigastric to left upper quadrant and she has mistakenly attributed this to her mobile cecum.  She describes a fever of 101 again today at 4:30 a.m. when she was awakened by pain, not using electric blanket or her heating pad.      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  She has had extensive testing 2015 and before demonstrating no Behcet's in the GI tract mucosa. 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 Samara had admission 12/18 to 12/22 for neurologic spells of a couple types including convulsive and syncopal spells and staring spells.  For this, she had 3-day video EEG recording.  No specific neurologic findings were reported initially; tilt test was negative but terminated prematurely and a possible psychogenic component was noted. .      Samara has used Linzess intermittently because it causes diarrhea when used daily.  In the past, she has had Dicyclomine 20 mg which gave benefit.  She has known irritable bowel syndrome, severe constipation if she does not treat it, occasional vomiting when she is severely constipated, other times with severe abdominal pain, vomiting, some urgent stool lasting briefly " but pain resolved prior to CTs done on almost every occasion.  Prior to the CT of 11/03/2017 when the cecum in the left upper quadrant, she had CT 04/2017 with mural stratification in the ascending colon soon after one of her more severe pain episodes and which could be seen after mobile cecum had returned to normal anatomic position.      Samara has been treating her constipation more regularly since they moved into their own location end of October.  However, during a December hospitalization (of four days), she did not have stool for a total of 9 days, and knowing that she would be home in 2 days she declined Linzess the last 2 days.  She takes her Linzess about every other day in the morning and exercises to aid any possible evacuation thereafter.     Samara is here with her mother, and they are exceedingly frustrated.  Samara continues to have fairly constant abdominal pain primarily above the waist and to the mid and left abdomen.  She was attributing this pain to her mobile cecum, though has also had severe pain at times with CTs not showing the cecum out of place or with any telltale signs.  However, most CTs have been done after resolution of the more severe pain.      Smaara and her mother also seemed to believe that diarrhea on an ongoing basis would be caused by the mobile cecum rather than it being caused by acute illness or something else.  Samara reports having had fever earlier today again at 101 degrees.  That was at 4:38 a.m. with neither electric blanket nor any unusual heat in the room.  She has not yet had diarrhea today, though she did yesterday, 2-4 times, less than previously. This was not watery but mushy. The fever has been going on since 01/05.      Samara speaks of being able to eat only once daily but is able to additionally have an Atkins protein shake, once each morning with half of an Apple with no skin or with some strawberry and some additional nutrition  "which she does not count as a meal. Her weight has not fallen.     Samara was quite surprised that she had only a fair prep at colonoscopy 1/3/2018 which was only good after extensive washing, as she had done what she considered a very huge prep.  She used MiraLax, Linzess, magnesium citrate, lactulose.    OBJECTIVE: /82  Pulse 121  Temp 99.2  F (37.3  C) (Oral)  Ht 1.6 m (5' 3\")  Wt 68 kg (150 lb)  LMP 01/07/2018  BMI 26.57 kg/m2   Clean young woman here with her mother. Affect is flat.   Breathing is calm and grossly normal.  Abdomen is soft, tender mid and LUQ. Examined only in the chair.    RESULTS:  1/3/2018  The colonoscopy was visually normal.  Upper endoscopy on the same date showed possible 4 mm nodule in the upper esophagus and was otherwise visually normal with no cause for pain seen.  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   Note CT 11/3/2017 with mobile cecum. 4/2017 with mural stratification of ascending colon.     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  Dr. Flores is their neurologist here, and they are not sure whether he himself has the know-how or tools to diagnose that and are wondering whether she should be referred to the [facility] for this.      Samara has a number of GI distresses.  We have not found any diagnosis for a number of them, though a number seem to be functional irritable bowel syndrome, primarily with constipation. She has had several studies in the past documenting that she does not have Behcet's in be intestinal mucosa.   She has had pain at a level I can believe could be associated with mobile cecum, though not today's pain, it seems.     PLAN:   1.  Abdominal x-ray ordered to be available to her at a time she has an intense abdominal pain to see whether she has cecum in the left upper quadrant at that time. [They are frustrated at ER " waits. They agree to go to ER if there is more than the pain going on .]  2.  Follow through with the upper GI small bowel follow-through ordered by Dr. Mojica or Dr. Coburn.   3.  When the diarrhea that is no longer present, return to her program using linaclotide and dicyclomine as needed for abdominal cramping, cautioned against constipation.   4.  I will send a message to Dr. Flores regarding their questions regarding him or referral to the [facility] for diagnosis of possible neurologic Behcet's.   5.  She has many episodes of pain with vomiting which sometime appear to occur after severe constipation which also may release as an urgent, loose or watery stool.  Thus, it is confusing and we cannot say which more painful episodes might have been with the cecum in the left upper quadrant.  Note, however, that cecum in the left upper quadrant would rarely cause pain in that location as nerves are familiar with being on the right lower quadrant, so people often even perceive the pain as being vague or mid or right abdominal when the cecum is misplaced.   6.  Soluble fiber is also recommended for improved stool form and stabilization of her stool form, improved bulk and less mush, less skinny as it tends towards looser.   7.  She is advised to call as needed.   8.  Return to GI Clinic: She is scheduled for next week with her new GI provider, Dr. Estela Cuenca.    We discussed the history/results, assessment and plan. Questions were answered. Written notes were provided.       Total visit 25 minutes with counseling time 15 minutes.         EVI DE LA FUENTE CNP         D: 01/10/2018 13:24   T: 2018 06:08   MT: ABILIO   Document: [id number]      D: 01/15/2018 22:25   T: 2018 08:25   MT: LEIA   Name:     LUCINA BAH   MRN:      [id number]        Account:      [id number]   :      1995           Service Date: 01/10/2018   Document: [id number]         EVI DE LA FUENTE CNP

## 2018-01-15 ENCOUNTER — RADIANT APPOINTMENT (OUTPATIENT)
Dept: GENERAL RADIOLOGY | Facility: CLINIC | Age: 23
End: 2018-01-15
Attending: SURGERY
Payer: COMMERCIAL

## 2018-01-15 DIAGNOSIS — Q43.3 MOBILE CECUM (H): ICD-10-CM

## 2018-01-15 NOTE — TELEPHONE ENCOUNTER
Prior Authorization Approval    Authorization Effective Date: 1/15/2018  Authorization Expiration Date: 1/15/2021  Medication: linaclotide (LINZESS) 145 MCG- PA Initiated  Approved Dose/Quantity:   Reference #: [id number]   Insurance Company: [employer] - Phone [phone] Fax [phone]  Expected CoPay: $35.00     CoPay Card Available:      Foundation Assistance Needed:    Which Pharmacy is filling the prescription (Not needed for infusion/clinic administered): [facility] [address] [address]  Pharmacy Notified: Yes  Patient Notified: Yes

## 2018-01-15 NOTE — PROGRESS NOTES
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 Lilliana Miramontes- complicated migraine  # On Sprintec for birth control   # Borderline transaminase elevation    1/18 Basic blood cell counts, liver and kidney function labs within normal limits except anemia. Patient to discuss with PCP regarding anemia.     Meets ISG 1990 criteria for Behcets. Initially, very good response to colchicine which has reduced the intensity and frequency of the oral ulcers in the past. Patient relapsed with genital ulcers and few oral ulcers in the end of 2016 and also with folliculitis in skin. Seen Derm Dr. Elliott. He recommended otezla. Otezla is working for her and she takes twice daily 30mg PO daily. Chest pain , mouth sores, hand pain, morning pain are all better when she tried otezla 30mg twice daily. Currently able to tolerate 30mg twice daily with some nausea. The severity and frequency of oral and genital ulcers have improved on otezla already. No episodes in the last 4 weeks. When she skipped couple of days of otezla, her symptoms were worse. Continue colchicine 1.2 mg in AM and 0.6mg in PM daily. Today, we discussed whether colchicine is helping. Patient prefers not to reduce the dose of colchicine. I am okay with this. Next visit, if her symptoms are stable, we can try reducing  colchicine to 0.6mg twice dailyt.      Neurology investigating for seizures and cause. Hospitalized 12/17. 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     Has tried prednisone in the past, but does not tolerate well due to mood issues, and has been off prednisone for the past 6+ months. Has H/o Tourettes. Followed by Dr. Miramontes.     She continues to have GI symptoms  Colonoscopy and endoscopy negative 2018.  Has gastroparesis.  Behcets may have GI involvement but no evidence of GI inflammation at this time. Dr. Baker has evaluated her. Dr. Rollins did the endoscopies. Her abdominal pain is not related to otezla/colchicine as her abdominal symptoms were present even before they were started. This was verified with the patient.      She gets visual symptoms  But there is no evidence of uveitis including posterior uveitis or retinal vasculitis, denies symptoms at today's visit. She has seen Dr. Garcia in the past and also seen Dr. Heaton around 2/16 and 9/17. Patient still getting double vision and floaters but 9/17 eye exam was stable.     She also likely has fibromyalgia which is uncontrolled. Her behcets treatment has been optimized now. The remnant pain is likely from fibromyalgia. I would request pain clinic to help with fibromyalgia management.     For chest symptoms, she was referred to pulmonology by GI. CT chest negative for any lung issues 1/18    - Continue oral gel/Triamcinolone acetonide cream (0.1 percent in Orabase) three to four times daily during attacks of oral ulcers and be used until pain from the ulcer ceases. Potent topical corticosteroids may be used for genital ulcers. Also use magic mouthwash as needed.  - Other comorbidities to watch for in Behcets: Vascular disease in Behçet s is more common in men. Large vessel vascular involvement occurs in approximately one-third of patients with Behcet s disease. Pulmonary artery vasculitis can present with hemoptysis (misdiagnosis can  lead to poor prognosis). Secondary fibromyalgia, CNS disease, amyloidosis, sacroiliitis.     Return in about 3 months (around 4/16/2018).    No orders of the defined types were placed in this encounter.      Medications Discontinued During This Encounter   Medication Reason     Simethicone 125 MG CAPS      PEG-KCl-NaCl-NaSulf-Na Asc-C (MOVIPREP) 100 G SOLR      magnesium citrate solution      Colchicine 0.6 MG CAPS      meclizine (ANTIVERT) 25 MG tablet      colchicine 0.6 MG tablet Reorder     apremilast (OTEZLA) 30 MG tablet Reorder     Current Outpatient Prescriptions   Medication Sig Dispense Refill     benzocaine (TOPICALE XTRA) 20 % GEL Apply as needed locally to mouth or nasal ulcers for pain; 4 times daily as needed 30 g 1     colchicine 0.6 MG tablet TAKE 2 TABLETS BY MOUTH EVERY MORNING AND 1 TABLET EVERY EVENING. 270 tablet 0     apremilast (OTEZLA) 30 MG tablet Take 1 tablet (30 mg) by mouth 2 times daily       linaclotide (LINZESS) 145 MCG capsule Take 1 capsule (145 mcg) by mouth every morning (before breakfast) 90 capsule 1     dicyclomine (BENTYL) 20 MG tablet Take 1 tablet (20 mg) by mouth 4 times daily as needed 120 tablet 3     aspirin-acetaminophen-caffeine (EXCEDRIN MIGRAINE) 250-250-65 MG per tablet Take 1 tablet by mouth every 6 hours as needed for headaches       SPRINTEC 28 0.25-35 MG-MCG per tablet TAKE 1 TABLET BY MOUTH ONCE DAILY. 84 tablet 2     hydrOXYzine (ATARAX) 25 MG tablet Take 1-2 tablets (25-50 mg) by mouth every 6 hours as needed for anxiety 90 tablet 1     prazosin (MINIPRESS) 1 MG capsule Take 1 capsule (1 mg) by mouth At Bedtime For nightmares. 30 capsule 1     OnabotulinumtoxinA (BOTOX IJ) Inject 165 Units into the muscle once Lot /C3  Exp 06/2020       guanFACINE (TENEX) 1 MG tablet Take 3 tablets (3 mg) by mouth 2 times daily 270 tablet 3     diltiazem 2% in PLO cream, FV COMPOUNDED, 2% GEL Apply small pea size amount three times daily to anus until pain is gone. 30 g  1     amitriptyline (ELAVIL) 50 MG tablet Take 1 tablet (50 mg) by mouth At Bedtime 30 tablet 11     lactulose 20 GM/30ML SOLN Take 30 mLs by mouth 3 times daily as needed 300 mL 3     LORazepam (ATIVAN) 1 MG tablet Take 0.5 tablets (0.5 mg) by mouth 2 times daily as needed for other (atypical chest pain) Do not operate a vehicle after taking this medication 60 tablet 0     ondansetron (ZOFRAN-ODT) 8 MG ODT tab Take 1 tablet (8 mg) by mouth every 8 hours as needed for nausea 60 tablet 6     diphenhydrAMINE (BENADRYL) 25 MG tablet Take 1 tablet (25 mg) by mouth every 8 hours as needed for allergies 30 tablet 0     sucralfate (CARAFATE) 1 GM/10ML suspension Take 10 mLs (1 g) by mouth 4 times daily 1200 mL 2     diclofenac (VOLTAREN) 1 % GEL topical gel Apply 2-4 grams to affected area(s) up to 4 times per day as needed. This is an anti-inflammatory medication. 100 g 4     aspirin (ASPIRIN CHILDRENS) 81 MG chewable tablet Take 81 mg by mouth as needed        omeprazole (PRILOSEC) 40 MG capsule Take 1 capsule (40 mg) by mouth daily 90 capsule 3     EPINEPHrine (EPIPEN 2-EAMON) 0.3 MG/0.3ML injection Inject 0.3 mLs (0.3 mg) into the muscle as needed for anaphylaxis 0.6 mL 3     albuterol (PROAIR HFA/PROVENTIL HFA/VENTOLIN HFA) 108 (90 BASE) MCG/ACT Inhaler Inhale 2 puffs into the lungs every 6 hours as needed for shortness of breath / dyspnea or wheezing 1 Inhaler 1     Magic Mouthwash (FV std formula) lidocaine visc 2% 2.5mL/5mL & maalox/mylanta w/ simeth 2.5mL/5mL & diphenhydrAMINE 5mg/5mL Swish and swallow 10 mLs in mouth every 6 hours as needed for mouth sores 1 Bottle 1     clobetasol (TEMOVATE) 0.05 % cream Apply topically 2 times daily 60 g 0     botulinum toxin type A (BOTOX) 100 UNITS injection Inject 200 Units into the muscle every 3 months 200 Units 3     hyoscyamine (ANASPAZ,LEVSIN) 0.125 MG tablet Take 1-2 tablets (125-250 mcg) by mouth every 4 hours as needed for cramping 40 tablet 1     hypromellose  (GENTEAL) 0.3 % SOLN 1 drop every hour as needed for dry eyes        betamethasone valerate (VALISONE) 0.1 % cream Apply topically 2 times daily       carbamide peroxide (DEBROX) 6.5 % otic solution 5 drops 2 times daily       Lactase (LACTAID PO) Take by mouth daily       COMPOUNDED NON-CONTROLLED SUBSTANCE (CMPD RX) - PHARMACY TO MIX COMPOUNDED MEDICATION Take one capsule in the morning 30 capsule 0     lidocaine (XYLOCAINE) 2 % jelly Apply 1 Tube topically daily Reported on 4/21/2017       triamcinolone (KENALOG) 0.1 % cream Apply sparingly to oral ulcers three times daily for 14 days as needed. 15 g 1     Austen Marquez MD.           Active Problem List:     Patient Active Problem List    Diagnosis Date Noted     Spells of decreased attentiveness 12/19/2017     Priority: Medium     Mobile cecum 11/09/2017     Priority: Medium     Cecum noted in Right lower quadrant on 4/17 CT scan, and in Left upper Quadrant on CT on 11/2017.       Vitamin D deficiency 10/11/2017     Priority: Medium     How low, unknown/not found. On D when tested at 28. Starting cholecalciferol October 2017. Needs recheck.       Convulsions, unspecified convulsion type (H) 10/03/2017     Priority: Medium     Transient alteration of awareness 10/03/2017     Priority: Medium     Chronic pain syndrome 07/27/2017     Priority: Medium     Major depressive disorder, recurrent episode, moderate (H) 06/27/2017     Priority: Medium     Cervical pain 05/02/2017     Priority: Medium     Acute left ankle pain 03/31/2017     Priority: Medium     Cervical dystonia 03/28/2017     Priority: Medium     PTSD (post-traumatic stress disorder) 01/17/2017     Priority: Medium     Patellofemoral instability 10/20/2016     Priority: Medium     Fibromyalgia 08/04/2016     Priority: Medium     Rheumatoid arthritis of multiple sites without rheumatoid factor (H) 08/04/2016     Priority: Medium     Raynaud's disease without gangrene 08/04/2016     Priority:  Medium     Chronic abdominal pain 08/04/2016     Priority: Medium     Palpitations 01/12/2016     Priority: Medium     On colchicine therapy 10/30/2015     Priority: Medium     Spell of shaking 05/06/2015     Priority: Medium     Migraine 02/04/2015     Priority: Medium     Problem list name updated by automated process. Provider to review       Behcet's disease (H) 12/10/2014     Priority: Medium     Headaches due to old head injury 11/12/2013     Priority: Medium     Milk protein intolerance 10/11/2013     Priority: Medium     Concussion 02/13/2013     Priority: Medium     Jan 2013, with prolonged recovery- followed by sports med         Knee pain 01/03/2013     Priority: Medium     TAHIR (generalized anxiety disorder) 06/25/2009     Priority: Medium     Tics - Tourette syndrome 05/18/2009     Priority: Medium     Followed by psychotherapy. Symptoms well managed. Originally diagnosed at [facility] neurology. (Dr. Simpson)           IBS (irritable bowel syndrome) 05/18/2009     Priority: Medium     Seasonal allergic rhinitis 05/18/2009     Priority: Medium          History of Present Illness:     Chief Complaint   Patient presents with     RECHECK     Seen Dr. Marilyn Moran Rheumatology in [facility] 10/14:    Original presentation 2014:    Ms Oropeza is a 19 y.o. female who presents with one year of presentation of painful oral ulcers (monthly) once that have worsened with two episodes in the last two months that presented with painful vulvar or vaginal ulcers (2 episodes so far every month) [not sure if they leave scar] as well that timing coincides with menses ([facility] [place]: 8/25/14).   ORAL ULCERS: last two episodes were severe, painful, white base with erythematous halo, that appear and disappear in the matter of 1-2 weeks without, does not bleed and doesn't respond to any treatment used (magic mouthwash), 10-15 in number with the biggest one being dime size.     VAGINAL ULCERS: 2-3 in number between labia majora and minora that occur  simultaneously with oral ulcers, painful, non bleeding ulcers that are erythematous, no pus.     FOLLICULITIS: patient reports having spots in legs specially around ankle and knee, but arms, and neck are affected as well. Small lesions that resemble ingrown hair, most are red erythematous elevated lesions, well demarcated, some with liquid that patient refers as pimple like.     SKIN RASHES: welts and red macules with well defined borders that are pruritic and non-ulcerative on chest, arms and back. Benadryl relieves.     ARTHRALGIAS: In descending order of severity: hips, knees, wrists, shoulders, neck, lumbar, fingers.   C/o morning stiffness in hips, back and neck lasting for about until noon.     Ms. Oropeza reports they present in severe flares and never fully resolve, but do get better. She has seen some swelling and warmth on the affected joints but has not noticed and redness. Has tried Tylenol, ibuprofen, and hydrocodone with not much benefit.     FEVERS: Reports 3-4 sporadic episodes per month of self limited fevers of 38 C that occur during the evenings and associate facial flushing.     HEADACHES: occur daily and are bitemporal and irradiate occipitally, pulsatile in nature, intensity varies from intense to mild, are worsened with movement. On treatment with ibuprofen and somatriptan without any positive results.     VISION CHANGES: Patient reports that suddenly she would only see a gray blotch in her right eyes and would persist like this for a day and a half. Also reports blurriness in her left eye. Presence of pain and burning sensation of eyeball with associated redness. Has changed prescription glasses in the matter of 2 years 3 times. She has had opthalmology exam and was not suggestive of any evidence of uveitis.   She was also evaluated in ED for a dizzy spell.     ABD PAIN W/ INTERMITTENT DIARRHEA AND CONSTIPATION: Patient reports abdominal pain that is intermittent, periods of 7 days without  bowel movement and feeling bloated with change of pattern to diarrhea (liquidy without blood nor mucus, non foul smelling). Has taken Bentyl, Zegrid, and rinetidine with mild clinical improvement.  She also reports small red nodules after each needle stick for blood draw.   Neurology saw her back then and was not impressed with her presentation as physical examination was normal. Also MRI brain normal: Findings: No definite hemorrhage, mass affect, midline shift, or ventriculomegaly is noted.There are a few scattered non specific white matter T2 hyperintensities. Axial diffusion weighted images are unremarkable.     The major vascular structures appear patent. There is opacification and severe mucosal thickening in the right maxillary sinus. The remaining paranasal sinuses, and mastoid air cells are clear. Orbits are unremarkable  She has + HSV-1 IGG. Seen ID. Negative for Herpes simplex virus culture. HSV IGM I/II COMBINATION SENT TO [facility]  RESULTS = <0.91  REF RANGE: <0.91 = NEGATIVE  ID did not think HSV could explain her mouth and genital sores.     CRP within normal limits. HIV negative. CBC within normal limits; UA negative. Creatinine within normal limits   CYNDIE neg.  Antigliadin neg.   ANti TTG neg.   [facility] 2014: Colonoscopy and endoscopy neg; result not available. Not sure if biopsies were done.   Raynaud's phenomenon:  Onset: about 2013  Digits: all fingers  Digital ulcers: no  Color changes: white ---> purple and red  Duration of an attack: About couple of hours per mom  Pain during attack: not clear pain but bruise like feeling  Frequency of attacks: few times a month  Family history of Raynauds: no  GERD: very long time    No hemoptysis.   No miscarriages/ no thrombosis in past.   No family or personal history of psoriasis, ulcerative colitis or chron's disease.   No buttock pain or low back pain or stiffness in AM    Interim history:  Dec 2014- Multiple mouth ulcers and did not eat for 5 days. This  coincided with periods. Colchicine 0.6mg PO BID started in Nov 2014.   Prednisone taper in Dec 20mg to 0 in 4 weeks. She also used magic mouthwash. Kenalog cream. After going to the ER, 3 days later her ulcers were better. She thinks it improved after the menstruation stopped.   LMP 3 weeks ago.   COLCHICINE HAS HELPED A LOT REDUCING THE INTENSITY OF MENSTRUATION ASSOCIATED ORAL AND VULVAR ULCERS.     Interim history: 6/15    Since last visit only two episodes of mouth sores- small sized 6   No genital ulcers since last visit.   Colchicine 1.2 mg in AM and 0.6mg in PM keeps her symptoms under control.     Admitted in 5/15:  Admission Diagnoses:  - LUE weakness  - spell  - hx of migraines  - hx of Tourette syndrome  - hx of Behcet's disease    Discharge Diagnoses:   - spell likely 2/2 anxiety  - hx of migraines  - hx of Tourette syndrome  - hx of Behcet's disease    CT and MRI brain was normal.     Seeing Dr. Lilliana Miramontes soon as outpatient.     Dr. Garcia did not find any intraocular inflammation.      Interm 10/30/2015  ED for migraine. Continues to see neuro - MRI brain without lesions noted. Saw GI - upper barium normal. May be considering repeat colo. Worsening lesions in mouth and genitals. Has had continuous lesion in mouth x 2 months. 2 genital ulcers. Had fracture of right sesamoid in foot. Continues to have low grade fevers. New folliculitis on chest, legs and arms.     Interim hx: 1/11/2016    Pt states that since last visit she continues to have oral ulcers and has noted more folliculitis-like lesions on her lower extremities.  She states that on her right lower leg she had a red macular spot that has since resolved.  She denies uveitis.  She states that the colchicine initially helped but then stopped working.  She takes 0.6 mg TID.  She stopped taking her prednisone last week as she feels like this hasn't helped.  She hasn't had any episodes of vaginal ulcers.      She saw GI who stated she has  "gastroparesis.  She is seeing Cardiology later this week for possible syncopal episodes.      Interim history 5/16  Mouth ulcers X 1 last month  Genital ulcers - none since last visit.     Bilateral MCPs pain, knee pain and low back pain +ve increased since last visit  Morning stiffness X 2 hours (increasing)   5-6/10    \"overall myalgia\" has gotten worse    C/o pseudofolliculitis lesion on anterior shins bilaterally worse    Interim history: (7/18/2016)  Patient has just started Humira for 2 doses on 7/1 and 7/15 and reported minimal improvement of her hip pain but otherwise, did not notice anything different.     She reported painful mouth ulcer once a month since last visit but noticed that it has been getting deeper.   Denied any genital ulcers.    Still has ongoing bilateral MCPs pain, knee pain but this has been intermittent and she did not have any much pain today. She reported 2-3 hours morning stiffness of both hands and pain score of 6/10 at her knees and 8/10 of her hands but currently takes no pain medication except prn ativan which she takes when her muscle is really tight.     The only concern is that she gained weight even though she has not been eating much (eat once a day) and do exercises every day for 1 hour (with video of yoga, pilates). Her mother wonders if she needs to have some labs work up include sex hormone, thyroid, cortisol.    Interval history 9/14/16  Patient was started Humira at the last visit, patient reports worsening of skin ulcers since starting Humira and stopped taking Humura for the past 2 weeks. Patient reports oral and genital ulcers has been stable even before starting Humira and has not had any interval oral/genital ulcers. However she reports recurrent skin ulcers occurring on a daily basis that affects her chest and anterior legs. Patient also has stopped taking prednisone, she reports that she doesn't feel the prednisone helps, her last dose of prednisone was 6+ months " ago. Patient also report worsening low back pain that sometimes causes her to limp.    In the interval patient also visited ED on 8/31/16 for left hand pain and what the patient describes as phlebitis, no medication or procedure was done and the patient was discharged with a splint. Patient also reported 1 episode of chest pressure that was associated with dyspnea and numbness of the left arm, she was shopping in a supermarket at the time of onset, and the pressure resolved after she took a nap.    Patient denies any infectious symptoms in the interval, no fever, chills, night sweat, cough, dysuria, denies eye pain or visual changes.    November 4, 2016  Oct - had one genital ulcer  Oral ulcers X 5- around menstruation time.   Knee pain- chronic on the left side  Dizziness spells - on and off; been to the ER for that in the past.   On and off migraines  July 2013 - s/p MPFL reconstruction plus LRL 7/2013  Morning stiffness in knee (left) X 1 hour    Severe jaw pain and chest pain- patient wondering if this is Tourette's or esophageal spasms. Cardiac workup negative.   Fever     January 13, 2017  [place] [facility] [place] from [place].   Chest pain on and off still continues. Painful with deep breaths.   Oral ulcers - few minor ones lasting for one week;   One major one in roof of mouth lasting for about one week.   Knee pain +ve - reviewed the recent MRI with her orthopedic surgeon.   On and off migraines  otezla is approved. Still waiting to receive the meds.     March 31, 2017  Otezla current dose 15mg PO BID.   She is tolerating okay at this dose and is willing to increase the dose further up to 30mg BID.   Her joint pains are better on otezla. Knee pain is also better.   Back and neck pain +ve 8/10 when it is worse. Intramuscular botox injections were given on Monday in PMR.   2 minor genital ulcers in the interim- resolved.   Few oral ulcers in the interim- resolved.   Nasal ulcer -  resolved.   Migraines on and off.   Nausea with otezla but improving.   Dizziness and blackouts on and off. She fell once and hurt her ankle. Wearing boot in left leg.   Complete ROS negative except for above    May 17, 2017  Tolerating otezla better. Not throwing up anymore. Current dose 20mg in AM and 30mg in PM (2nd week).   Patient thinks that her oral ulcers frequency and severity are improving with otezla. Also no genital ulcers in the interim.   Currently on doxycycline for bronchitis/?pneunomia. 4 more days left.     Joint pain history  2 weeks ago/ dx with pneumonia 1 week ago/  Involved joints: all over  Pain scale: 6.5/10   Wakes the patient from sleep : Yes  Morning stiffness: Yes for 120 minutes  Meds used:otezla,colchicine     Interim history  Since last visit:  1. Infections - Yes/ pneumonia  2. New symptoms/medical problem - Yes/ thinks she may have had a mini-seizure about 10 days ago ; did not seek medical attention; did not reoccur after that. Patient seeing PCP today.  3. Any side effects from Rheum medications -vomiting a lot (resolved)  3. ER visits/Hospitalizations/surgeries - Yes  4. Last PCP visit: has an apt. today    Patient still getting double vision. Floaters present.     Therapist recommended psychiatry evaluation. Patient does not want to see psychaitry. Patient will see PCP today.     August 22, 2017  Have you ever seen a rheumatologist Yes Who You When 5/17/17    NO genital ulcers in the interim.   Mouth ulcers- three in the back of the throat and roof of the mouth last month.     Joint pain history  Onset: doing alittle worse / cut her otezla back to 30mg  Daily due to GI problems  Involved joints: all over her body. Been having more black out episodes, been having more chest pains.also has raised bumps on both legs from the knees down that itch and are painful   Pain scale:  5.5/10     Wakes the patient from sleep : Yes  Morning stiffness:Yes for 120 minutes  Meds used:otezla,  colchicine (three pills daily)     Interim history  Since last visit:  1. Infections - Yes stomach issue  2. New symptoms/medical problem - No  3. Any side effects from Rheum medications -nausea from otezla  3. ER visits/Hospitalizations/surgeries - Yes, ER for foot, ankle injury from passing out and fell down the steps. Hit her head another time from passing out. Alcohol poisoning in July 4. Last PCP visit: 6/23/17 September 19, 2017  Have you ever seen a rheumatologist Yes Who You When 8/22/17  Joint pain history  Onset: pt states that she hurts everywhere for 6 days  Involved joints: all joints  Pain scale:  7/10     Wakes the patient from sleep : Yes/ not sleeping at all  Morning stiffness:Yes for 180 minutes  Meds used:colchicine, otezla, magic mouth wash, lidocaine gel  Last one week: increased joint pain; and genital ulcer  Genital lesion (dimed size) in the last one week  After botox a week ago, she had fever 101 for three days; near syncopes. Back pain; floaters  Chest pain , mouth sores, hand pain, morning pain were all better with otezla 30mg twice daily.    Interim history  Since last visit:  1. Infections - No  2. New symptoms/medical problem - No  3. Any side effects from Rheum medications -nausea from the otezla  3. ER visits/Hospitalizations/surgeries - No  4. Last PCP visit: may 2017     October 18, 2017     Have you ever seen a rheumatologist Yes Who You When 9/19/17  Joint pain history  NO mouth or genital sores in the last 4 weeks.   Medrol dosepak helped with visual symptoms but not joint pains.     Onset: pt states that she is doing better than last time with her behcet's. Today has severe stomach pains, states that she is constipated. Pt had a seizure 2 days ago. Does have a metallic taste in her mouth  Involved joints: hands , neck, shoulders  Pain scale:  9/10 from stomach pain;      Wakes the patient from sleep : Yes  Morning stiffness:Yes for 120 minutes  Meds used:cochicine , otezla  30mg twice daily     Interim history  Since last visit:  1. Infections - No  2. New symptoms/medical problem - Yes/ the cartilage in her chest is inflamed  3. Any side effects from Rheum medications -nausea from the otezla  3. ER visits/Hospitalizations/surgeries - No  4. Last PCP visit: yesterday    January 16, 2018  Have you ever seen a rheumatologist Yes Who You When 10/18/17  Joint pain history  Onset: pt states that she was in the hospital for 5 days before maribell, they told her she had lesions in her brain in the white matter. Had blood work drawn in the ER and they told her her inflammatory markers were elevated. Was seen in the ER last week for vomiting and diarrhea, not eating. Saw Gastro. On 1/10/18. 1.5 weeks ago she had an endoscopy and colonoscopy. She has been having elbow  And hands and knee pain, loosing use of her hands. Been dropping things. Also states that she has been getting lesions up her nose  Involved joints: see above  Pain scale:  8/10     Wakes the patient from sleep : Yes  Morning stiffness:Yes for 120 minutes  Meds used:colchisine, otezla, magic mouth wash, kenolog cream, lidocaine gel     Interim history  Since last visit:  1. Infections - Yes/ had an infection in her jaw. Having sinus issues  2. New symptoms/medical problem - Yes/ states that she is having problems walking, states that she was bouncing when she was walking  3. Any side effects from Rheum medications -otezla, nausea  3. ER visits/Hospitalizations/surgeries - Yes/ see chart  4. Last PCP visit: November 2017         Past Medical History:     Past Medical History:   Diagnosis Date     Anxiety      Arthritis      Behcet's disease (H)      Cervical adenitis May 2010     Chronic abdominal pain      Constipation, chronic 1994     Gastro-oesophageal reflux disease      Gastroparesis      Migraines      Neuromuscular disorder (H)     fibramyalgia     Palpitations      Seizure (H)      Seizures (H)     unknown etiology      Syncope      Tourette's      Past Surgical History:   Procedure Laterality Date     ARTHROSCOPY KNEE WITH PATELLAR REALIGNMENT  7/25/2013    Procedure: ARTHROSCOPY KNEE WITH PATELLAR REALIGNMENT;  Left Knee Arthroscopy, Medial Patellofemoral Ligament Reconstruction with Allograft  ;  Surgeon: Jennifer Acevedo MD;  Location: [facility]     COLONOSCOPY  2015     DENTAL SURGERY  1996    Teeth removal     ENDOSCOPY UPPER, COLONOSCOPY, COMBINED  2005     HC ESOPH/GAS REFLUX TEST W NASAL IMPED >1 HR N/A 2/15/2017    Procedure: ESOPHAGEAL IMPEDENCE FUNCTION TEST WITH 24 HOUR PH GREATER THAN 1 HOUR;  Surgeon: Timothy Matta MD;  Location:  GI          Social History:     Social History     Occupational History     Not on file.     Social History Main Topics     Smoking status: Never Smoker     Smokeless tobacco: Never Used     Alcohol use 0.6 oz/week     1 Standard drinks or equivalent per week      Comment: rarely     Drug use: No     Sexual activity: Yes     Partners: Male     Birth control/ protection: Pill          Family History:     Family History   Problem Relation Age of Onset     Depression Mother      Neurologic Disorder Mother      Migraines, take imitrex injection.  Also in maternal grandmother.       Alcohol/Drug Father      Hypertension Father      Depression Father      Cardiovascular Maternal Grandmother      Depression Maternal Grandmother      Hypertension Maternal Grandmother      Alzheimer Disease Maternal Grandmother      Cardiovascular Maternal Grandfather      Hypertension Maternal Grandfather      Depression Maternal Grandfather      Alcohol/Drug Maternal Grandfather      Cardiovascular Paternal Grandmother      Hypertension Paternal Grandmother      Cardiovascular Paternal Grandfather      Hypertension Paternal Grandfather      Glaucoma No family hx of      Macular Degeneration No family hx of           Allergies:     Allergies   Allergen Reactions     Amoxil [Penicillins] Rash     Dad  unsure of reaction.     Bee Venom Anaphylaxis     Contrast Dye Rash     Contrast Media Ready-Box [facility] [place], 04/09/2014.; Contrast [employer] [facility] [place], 04/09/2014.  NOTE: this is a contrast media oral with iodine. Premedicate with methylpred standard for IV contrast, request barium contrast for oral contrast.     Kiwi Swelling     Orange Fruit [Citrus] Anaphylaxis     Pineapple Anaphylaxis, Difficulty breathing and Rash     Reglan [Metoclopramide] Other (See Comments)     IV dose only, in ER, rapid heart rate.     Ace Inhibitors      Difficulty in breathing and GI upset     Amitiza [Lubiprostone] Nausea and Vomiting     Amoxicillin-Pot Clavulanate      Latex      Midazolam Unknown     Other reaction(s): Unknown  parent states that when pt takes this medication, she wakes up being very violent .  parent states that when pt takes this medication, she wakes up being very violent .     No Clinical Screening - See Comments      Bleech/ chest tightness, itchy throat, swollen tongue, hives     Versed      Coming out of pelvic exam at age of 5, was kicking and screaming when coming out of the versed.     Adhesive Tape Rash     Azithromycin Hives and Rash     Cephalexin Itching and Rash     Itchy mouth  Itchy mouth     Keflex [Cephalexin-Fd&C Yellow #6] Hives          Medications:     Current Outpatient Prescriptions   Medication Sig Dispense Refill     benzocaine (TOPICALE XTRA) 20 % GEL Apply as needed locally to mouth or nasal ulcers for pain; 4 times daily as needed 30 g 1     colchicine 0.6 MG tablet TAKE 2 TABLETS BY MOUTH EVERY MORNING AND 1 TABLET EVERY EVENING. 270 tablet 0     apremilast (OTEZLA) 30 MG tablet Take 1 tablet (30 mg) by mouth 2 times daily       linaclotide (LINZESS) 145 MCG capsule Take 1 capsule (145 mcg) by mouth every morning (before breakfast) 90 capsule 1     dicyclomine (BENTYL) 20 MG tablet Take 1 tablet (20 mg) by mouth 4 times daily as needed 120 tablet 3     aspirin-acetaminophen-caffeine  (EXCEDRIN MIGRAINE) 250-250-65 MG per tablet Take 1 tablet by mouth every 6 hours as needed for headaches       SPRINTEC 28 0.25-35 MG-MCG per tablet TAKE 1 TABLET BY MOUTH ONCE DAILY. 84 tablet 2     hydrOXYzine (ATARAX) 25 MG tablet Take 1-2 tablets (25-50 mg) by mouth every 6 hours as needed for anxiety 90 tablet 1     prazosin (MINIPRESS) 1 MG capsule Take 1 capsule (1 mg) by mouth At Bedtime For nightmares. 30 capsule 1     OnabotulinumtoxinA (BOTOX IJ) Inject 165 Units into the muscle once Lot /C3  Exp 06/2020       guanFACINE (TENEX) 1 MG tablet Take 3 tablets (3 mg) by mouth 2 times daily 270 tablet 3     diltiazem 2% in PLO cream, FV COMPOUNDED, 2% GEL Apply small pea size amount three times daily to anus until pain is gone. 30 g 1     amitriptyline (ELAVIL) 50 MG tablet Take 1 tablet (50 mg) by mouth At Bedtime 30 tablet 11     lactulose 20 GM/30ML SOLN Take 30 mLs by mouth 3 times daily as needed 300 mL 3     LORazepam (ATIVAN) 1 MG tablet Take 0.5 tablets (0.5 mg) by mouth 2 times daily as needed for other (atypical chest pain) Do not operate a vehicle after taking this medication 60 tablet 0     ondansetron (ZOFRAN-ODT) 8 MG ODT tab Take 1 tablet (8 mg) by mouth every 8 hours as needed for nausea 60 tablet 6     diphenhydrAMINE (BENADRYL) 25 MG tablet Take 1 tablet (25 mg) by mouth every 8 hours as needed for allergies 30 tablet 0     sucralfate (CARAFATE) 1 GM/10ML suspension Take 10 mLs (1 g) by mouth 4 times daily 1200 mL 2     diclofenac (VOLTAREN) 1 % GEL topical gel Apply 2-4 grams to affected area(s) up to 4 times per day as needed. This is an anti-inflammatory medication. 100 g 4     aspirin (ASPIRIN CHILDRENS) 81 MG chewable tablet Take 81 mg by mouth as needed        omeprazole (PRILOSEC) 40 MG capsule Take 1 capsule (40 mg) by mouth daily 90 capsule 3     EPINEPHrine (EPIPEN 2-EAMON) 0.3 MG/0.3ML injection Inject 0.3 mLs (0.3 mg) into the muscle as needed for anaphylaxis 0.6 mL 3      "albuterol (PROAIR HFA/PROVENTIL HFA/VENTOLIN HFA) 108 (90 BASE) MCG/ACT Inhaler Inhale 2 puffs into the lungs every 6 hours as needed for shortness of breath / dyspnea or wheezing 1 Inhaler 1     Magic Mouthwash (FV std formula) lidocaine visc 2% 2.5mL/5mL & maalox/mylanta w/ simeth 2.5mL/5mL & diphenhydrAMINE 5mg/5mL Swish and swallow 10 mLs in mouth every 6 hours as needed for mouth sores 1 Bottle 1     clobetasol (TEMOVATE) 0.05 % cream Apply topically 2 times daily 60 g 0     botulinum toxin type A (BOTOX) 100 UNITS injection Inject 200 Units into the muscle every 3 months 200 Units 3     hyoscyamine (ANASPAZ,LEVSIN) 0.125 MG tablet Take 1-2 tablets (125-250 mcg) by mouth every 4 hours as needed for cramping 40 tablet 1     hypromellose (GENTEAL) 0.3 % SOLN 1 drop every hour as needed for dry eyes        betamethasone valerate (VALISONE) 0.1 % cream Apply topically 2 times daily       carbamide peroxide (DEBROX) 6.5 % otic solution 5 drops 2 times daily       Lactase (LACTAID PO) Take by mouth daily       COMPOUNDED NON-CONTROLLED SUBSTANCE (CMPD RX) - PHARMACY TO MIX COMPOUNDED MEDICATION Take one capsule in the morning 30 capsule 0     lidocaine (XYLOCAINE) 2 % jelly Apply 1 Tube topically daily Reported on 4/21/2017       triamcinolone (KENALOG) 0.1 % cream Apply sparingly to oral ulcers three times daily for 14 days as needed. 15 g 1          Physical Exam:   Blood pressure 90/54, pulse 95, temperature 98.3  F (36.8  C), temperature source Oral, height 1.6 m (5' 3\"), weight 68 kg (150 lb), last menstrual period 01/07/2018, SpO2 98 %, not currently breastfeeding.  Wt Readings from Last 4 Encounters:   01/16/18 68 kg (150 lb)   01/10/18 68 kg (150 lb)   01/08/18 69.3 kg (152 lb 12.5 oz)   12/20/17 68.9 kg (152 lb)     Constitutional: well-developed, appearing stated age; cooperative  Eyes: nl EOM, PERRLA, vision, conjunctiva, sclera  ENT: No oral ulcer seen.   nl external ears, nose, hearing, lips, teeth, " gums, throat  No mucous membrane lesions, normal saliva pool  Neck: no mass or thyroid enlargement  Resp: lungs clear to auscultation,   CV: RRR, no murmurs, rubs or gallops, no edema  GI: diffuse abdominal tenderness; no ABD mass; no HSM  : not tested  Lymph: no cervical, supraclavicular or epitrochlear nodes  MS: All shoulder, elbow, wrist, MCP/PIP/DIP, hip, ankle joints were examined and  found normal except tenderness on even light touch of both knees with minimal swelling but no warmth. No active synovitis or deformity. Full ROM.  Normal  strength. Fist 100%.  No dactylitis,  tenosynovitis, enthesopathy.  Skin: no nail pitting, alopecia, rash  Psych: nl judgement, orientation, memory, affect.         Data:     CBC RESULTS:   Recent Labs   Lab Test  06/06/17 2048   WBC  4.7   RBC  4.09   HGB  12.0   HCT  35.9   MCV  88   MCH  29.3   MCHC  33.4   RDW  13.1   PLT  189       Liver Function Studies -   Recent Labs   Lab Test  06/06/17 2048   PROTTOTAL  6.7*   ALBUMIN  3.8   BILITOTAL  0.3   ALKPHOS  91   AST  26   ALT  44       Creatinine   Date Value Ref Range Status   01/08/2018 0.72 0.52 - 1.04 mg/dL Final   ]    No results found for: URIC]    HLA-B27  No results for input(s): [id number], B1 in the last 89661 hours.  RF/CCP  Recent Labs   Lab Test  05/06/16   1554   CCPIGG  1   RHF  <20     CYNDIE/RNP/Sm/SSA/SSB  Recent Labs   Lab Test  11/01/16   1318   TREPAB  Negative     ESR/CRP  Recent Labs   Lab Test  04/21/17   1249  04/14/17   1351  11/06/16   1341  03/11/16   1350  11/18/15   1453   SED   --    --   4  5  6   CRP  <2.9  <2.9  <2.9  <2.9  <2.9

## 2018-01-16 ENCOUNTER — TELEPHONE (OUTPATIENT)
Dept: GASTROENTEROLOGY | Facility: CLINIC | Age: 23
End: 2018-01-16

## 2018-01-16 ENCOUNTER — OFFICE VISIT (OUTPATIENT)
Dept: RHEUMATOLOGY | Facility: CLINIC | Age: 23
End: 2018-01-16
Payer: COMMERCIAL

## 2018-01-16 ENCOUNTER — CARE COORDINATION (OUTPATIENT)
Dept: SURGERY | Facility: CLINIC | Age: 23
End: 2018-01-16

## 2018-01-16 VITALS
DIASTOLIC BLOOD PRESSURE: 54 MMHG | BODY MASS INDEX: 26.58 KG/M2 | HEIGHT: 63 IN | HEART RATE: 95 BPM | OXYGEN SATURATION: 98 % | TEMPERATURE: 98.3 F | SYSTOLIC BLOOD PRESSURE: 90 MMHG | WEIGHT: 150 LBS

## 2018-01-16 DIAGNOSIS — M35.2 BEHCET'S DISEASE (H): Primary | ICD-10-CM

## 2018-01-16 PROCEDURE — 99214 OFFICE O/P EST MOD 30 MIN: CPT | Performed by: INTERNAL MEDICINE

## 2018-01-16 RX ORDER — COLCHICINE 0.6 MG/1
TABLET ORAL
Qty: 270 TABLET | Refills: 0 | Status: SHIPPED | OUTPATIENT
Start: 2018-01-16 | End: 2018-04-17

## 2018-01-16 RX ORDER — COLCHICINE 0.6 MG/1
TABLET ORAL
Qty: 3 TABLET | Refills: 1 | Status: CANCELLED | OUTPATIENT
Start: 2018-01-16

## 2018-01-16 ASSESSMENT — ROUTINE ASSESSMENT OF PATIENT INDEX DATA (RAPID3)
RAPID3 INTERPRETATION: HIGH > 12.0
TOTAL RAPID3 SCORE: 18

## 2018-01-16 NOTE — PROGRESS NOTES
Spoke with patient and she has already gotten upper endoscopy and colonoscopy results.  Discussed SBFT and reminded of upcoming appointment with Dr. Coburn.

## 2018-01-16 NOTE — NURSING NOTE
"   Patient presents with     RECHECK       Initial BP 90/54 (BP Location: Left arm, Patient Position: Chair, Cuff Size: Adult Regular)  Pulse 95  Temp 98.3  F (36.8  C) (Oral)  Ht 1.6 m (5' 3\")  Wt 68 kg (150 lb)  LMP 01/07/2018  SpO2 98%  BMI 26.57 kg/m2 Estimated body mass index is 26.57 kg/(m^2) as calculated from the following:    Height as of this encounter: 1.6 m (5' 3\").    Weight as of this encounter: 68 kg (150 lb).  Medication Reconciliation: complete    Have you ever seen a rheumatologist Yes Who You When 10/18/17  Joint pain history  Onset: pt states that she was in the hospital for 5 days before maribell, they told her she had lesions in her brain in the white matter. Had blood work drawn in the ER and they told her her inflammatory markers were elevated. Was seen in the ER last week for vomiting and diarrhea, not eating. Saw Gastro. On 1/10/18. 1.5 weeks ago she had an endoscopy and colonoscopy. She has been having elbow  And hands and knee pain, loosing use of her hands. Been dropping things. Also states that she has been getting lesions up her nose  Involved joints: see above  Pain scale:  8/10     Wakes the patient from sleep : Yes  Morning stiffness:Yes for 120 minutes  Meds used:colchisine, otezla, magic mouth wash, kenolog cream, lidocaine gel    Interim history  Since last visit:  1. Infections - Yes/ had an infection in her jaw. Having sinus issues  2. New symptoms/medical problem - Yes/ states that she is having problems walking, states that she was bouncing when she was walking  3. Any side effects from Rheum medications -otezla, nausea  3. ER visits/Hospitalizations/surgeries - Yes/ see chart  4. Last PCP visit: November 2017  Wt Readings from Last 4 Encounters:   01/16/18 68 kg (150 lb)   01/10/18 68 kg (150 lb)   01/08/18 69.3 kg (152 lb 12.5 oz)   12/20/17 68.9 kg (152 lb)     BP Readings from Last 3 Encounters:   01/16/18 90/54   01/10/18 109/82   01/08/18 135/75       "

## 2018-01-16 NOTE — MR AVS SNAPSHOT
After Visit Summary   1/16/2018    Samara Oropeza    MRN: [id number]           Patient Information     Date Of Birth          1995        Visit Information        Provider Department      1/16/2018 11:30 AM Austen Marquez MD [facility]        Today's Diagnoses     Behcet's disease (H)    -  1       Follow-ups after your visit        Follow-up notes from your care team     Return in about 3 months (around 4/16/2018).      Your next 10 appointments already scheduled     Jan 17, 2018  2:20 PM CST   (Arrive by 2:05 PM)   New Patient Visit with Estela Cuenca MD   [facility] ([place])    [address] [place] [address]  [address] [facility] [address]   [phone]            Jan 24, 2018  2:00 PM CST   (Arrive by 1:45 PM)   Return Visit with EVI Vizcaino [facility] ([place])    [address] [place] [address]  [address] [facility] [address]   [phone]            Jan 29, 2018  8:30 AM CST   (Arrive by 8:15 AM)   New Patient Visit with Vitor Coburn MD   [facility] and Rectal Surgery ([place])    [address] [place] [address]   [phone]            Feb 13, 2018  2:30 PM CST   (Arrive by 2:15 PM)   Return Seizure with Sigifredo Flores MD   [facility] Neurology ([place])    [address] [place] [address]  [address]rd [facility] [address]   [phone]            Feb 27, 2018 10:15 AM CST   Return Visit with Cata Hurst NP   [facility]    [address]  [address]  [facility] [id number] [phone]            Feb 28, 2018  3:00 PM CST   (Arrive by 2:45 PM)   Return Botox with Frederick Bender MD   [facility] Physical Medicine and Rehabilitation ([facility]  "[place])    [address] [facility]  3rd [facility] [address] [phone]              Who to contact     If you have questions or need follow up information about today's clinic visit or your schedule please contact [facility] directly at [phone].  Normal or non-critical lab and imaging results will be communicated to you by MyChart, letter or phone within 4 business days after the clinic has received the results. If you do not hear from us within 7 days, please contact the clinic through [employer] or phone. If you have a critical or abnormal lab result, we will notify you by phone as soon as possible.  Submit refill requests through [employer] or call your pharmacy and they will forward the refill request to us. Please allow 3 business days for your refill to be completed.          Additional Information About Your Visit        MyChart Information     [employer] gives you secure access to your electronic health record. If you see a primary care provider, you can also send messages to your care team and make appointments. If you have questions, please call your primary care clinic.  If you do not have a primary care provider, please call [phone] and they will assist you.        Care EveryWhere ID     This is your Care EveryWhere ID. This could be used by other organizations to access your [facility] medical records  [id number]        Your Vitals Were     Pulse Temperature Height Last Period Pulse Oximetry BMI (Body Mass Index)    95 98.3  F (36.8  C) (Oral) 1.6 m (5' 3\") 01/07/2018 98% 26.57 kg/m2       Blood Pressure from Last 3 Encounters:   01/16/18 90/54   01/10/18 109/82   01/08/18 135/75    Weight from Last 3 Encounters:   01/16/18 68 kg (150 lb)   01/10/18 68 kg (150 lb)   01/08/18 69.3 kg (152 lb 12.5 oz)              Today, you had the following     No orders found for display         Today's Medication Changes          These changes are accurate as of: 1/16/18 12:29 " PM.  If you have any questions, ask your nurse or doctor.               Start taking these medicines.        Dose/Directions    benzocaine 20 % Gel   Commonly known as:  TOPICALE XTRA   Used for:  Behcet's disease (H)   Started by:  Austen Marquez MD        Apply as needed locally to mouth or nasal ulcers for pain; 4 times daily as needed   Quantity:  30 g   Refills:  1         These medicines have changed or have updated prescriptions.        Dose/Directions    apremilast 30 MG tablet   Commonly known as:  OTEZLA   This may have changed:    - how much to take  - how to take this  - when to take this  - additional instructions   Used for:  Behcet's disease (H)   Changed by:  Austen Marquez MD        Dose:  30 mg   Take 1 tablet (30 mg) by mouth 2 times daily   Refills:  0       colchicine 0.6 MG tablet   This may have changed:  See the new instructions.   Used for:  Behcet's disease (H)   Changed by:  Austen Marquez MD        TAKE 2 TABLETS BY MOUTH EVERY MORNING AND 1 TABLET EVERY EVENING.   Quantity:  270 tablet   Refills:  0         Stop taking these medicines if you haven't already. Please contact your care team if you have questions.     magnesium citrate solution   Stopped by:  Austen Marquez MD           meclizine 25 MG tablet   Commonly known as:  ANTIVERT   Stopped by:  Austen Marquez MD           PEG-KCl-NaCl-NaSulf-Na Asc-C 100 G Solr   Commonly known as:  MOVIPREP   Stopped by:  Austen Marquez MD           Simethicone 125 MG Caps   Stopped by:  Austen Marquez MD                Where to get your medicines      These medications were sent to [place] [address] [facility] [place] [id number]  [address] [facility] [address]     Phone:  [phone]     benzocaine 20 % Gel    colchicine 0.6 MG tablet                Primary Care Provider Office Phone # Fax #    Sonja EVI Laguerre [facility] [phone]        [address] [facility] [address]  [facility] [address]        Equal Access to Services     DELISAFRANK FROYLAN : Hadii aad ku hadprimoo Soteddyali, waaxda luqadaha, qaybta kaalmada timshonamariya, mike salinas sajiemmy rodriguezallenchante parsons. So [facility] [phone].    ATENCIÓN: Si habla español, tiene a livingston disposición servicios gratuitos de asistencia lingüística. Llame al [phone].    We comply with applicable federal civil rights laws and [place] laws. We do not discriminate on the basis of race, color, national origin, age, disability, sex, sexual orientation, or gender identity.            Thank you!     Thank you for choosing [facility]  for your care. Our goal is always to provide you with excellent care. Hearing back from our patients is one way we can continue to improve our services. Please take a few minutes to complete the written survey that you may receive in the mail after your visit with us. Thank you!             Your Updated Medication List - Protect others around you: Learn how to safely use, store and throw away your medicines at www.disposemymeds.org.          This list is accurate as of: 1/16/18 12:29 PM.  Always use your most recent med list.                   Brand Name Dispense Instructions for use Diagnosis    albuterol 108 (90 BASE) MCG/ACT Inhaler    PROAIR HFA/PROVENTIL HFA/VENTOLIN HFA    1 Inhaler    Inhale 2 puffs into the lungs every 6 hours as needed for shortness of breath / dyspnea or wheezing    Atypical chest pain       amitriptyline 50 MG tablet    ELAVIL    30 tablet    Take 1 tablet (50 mg) by mouth At Bedtime    Abdominal pain, generalized, Insomnia due to medical condition       apremilast 30 MG tablet    OTEZLA     Take 1 tablet (30 mg) by mouth 2 times daily    Behcet's disease (H)       ASPIRIN CHILDRENS 81 MG chewable tablet   Generic drug:  aspirin      Take 81 mg by mouth as needed        aspirin-acetaminophen-caffeine 250-250-65 MG per tablet    EXCEDRIN  MIGRAINE     Take 1 tablet by mouth every 6 hours as needed for headaches        benzocaine 20 % Gel    TOPICALE XTRA    30 g    Apply as needed locally to mouth or nasal ulcers for pain; 4 times daily as needed    Behcet's disease (H)       betamethasone valerate 0.1 % cream    VALISONE     Apply topically 2 times daily        * botulinum toxin type A 100 UNITS injection    BOTOX    200 Units    Inject 200 Units into the muscle every 3 months    Cervical dystonia       * BOTOX IJ      Inject 165 Units into the muscle once Lot /C3 Exp 06/2020        carbamide peroxide 6.5 % otic solution    DEBROX     5 drops 2 times daily        clobetasol 0.05 % cream    TEMOVATE    60 g    Apply topically 2 times daily    Folliculitis       colchicine 0.6 MG tablet     270 tablet    TAKE 2 TABLETS BY MOUTH EVERY MORNING AND 1 TABLET EVERY EVENING.    Behcet's disease (H)       COMPOUNDED NON-CONTROLLED SUBSTANCE - PHARMACY TO MIX COMPOUNDED MEDICATION    CMPD RX    30 capsule    Take one capsule in the morning    Fibromyalgia       diclofenac 1 % Gel topical gel    VOLTAREN    100 g    Apply 2-4 grams to affected area(s) up to 4 times per day as needed. This is an anti-inflammatory medication.    Polyarthralgia       dicyclomine 20 MG tablet    BENTYL    120 tablet    Take 1 tablet (20 mg) by mouth 4 times daily as needed    Abdominal cramping       diltiazem 2% in PLO cream (FV COMPOUNDED) 2% Gel     30 g    Apply small pea size amount three times daily to anus until pain is gone.    Anal fissure       diphenhydrAMINE 25 MG tablet    BENADRYL    30 tablet    Take 1 tablet (25 mg) by mouth every 8 hours as needed for allergies        EPINEPHrine 0.3 MG/0.3ML injection 2-pack    EPIPEN 2-EAMON    0.6 mL    Inject 0.3 mLs (0.3 mg) into the muscle as needed for anaphylaxis    Hx of bee sting allergy       guanFACINE 1 MG tablet    TENEX    270 tablet    Take 3 tablets (3 mg) by mouth 2 times daily    Tic       hydrOXYzine 25 MG  tablet    ATARAX    90 tablet    Take 1-2 tablets (25-50 mg) by mouth every 6 hours as needed for anxiety    TAHIR (generalized anxiety disorder)       hyoscyamine 0.125 MG tablet    ANASPAZ/LEVSIN    40 tablet    Take 1-2 tablets (125-250 mcg) by mouth every 4 hours as needed for cramping    Abdominal pain, generalized       hypromellose 0.3 % Soln ophthalmic solution    GENTEAL     1 drop every hour as needed for dry eyes        LACTAID PO      Take by mouth daily        lactulose 20 GM/30ML Soln     300 mL    Take 30 mLs by mouth 3 times daily as needed    Constipation, unspecified constipation type       lidocaine 2 % topical gel    XYLOCAINE     Apply 1 Tube topically daily Reported on 4/21/2017        lidocaine visc 2% 2.5mL/5mL & maalox/mylanta w/ simeth 2.5mL/5mL & diphenhydrAMINE 5mg/5mL Susp suspension    [facility] Mouthwash [facility]    1 Bottle    Swish and swallow 10 mLs in mouth every 6 hours as needed for mouth sores    Behcet's syndrome (H)       linaclotide 145 MCG capsule    LINZESS    90 capsule    Take 1 capsule (145 mcg) by mouth every morning (before breakfast)    Chronic idiopathic constipation       LORazepam 1 MG tablet    ATIVAN    60 tablet    Take 0.5 tablets (0.5 mg) by mouth 2 times daily as needed for other (atypical chest pain) Do not operate a vehicle after taking this medication    Chronic abdominal pain       omeprazole 40 MG capsule    priLOSEC    90 capsule    Take 1 capsule (40 mg) by mouth daily    Gastroesophageal reflux disease, esophagitis presence not specified       ondansetron 8 MG ODT tab    ZOFRAN-ODT    60 tablet    Take 1 tablet (8 mg) by mouth every 8 hours as needed for nausea    Non-intractable vomiting with nausea, unspecified vomiting type, Hematemesis, presence of nausea not specified       prazosin 1 MG capsule    MINIPRESS    30 capsule    Take 1 capsule (1 mg) by mouth At Bedtime For nightmares.    PTSD (post-traumatic stress disorder)       SPRINTEC 28 0.25-35  MG-MCG per tablet   Generic drug:  norgestimate-ethinyl estradiol     84 tablet    TAKE 1 TABLET BY MOUTH ONCE DAILY.    General counseling for prescription of oral contraceptives       sucralfate 1 GM/10ML suspension    CARAFATE    1200 mL    Take 10 mLs (1 g) by mouth 4 times daily    Bile reflux gastritis, Nausea       triamcinolone 0.1 % cream    KENALOG    15 g    Apply sparingly to oral ulcers three times daily for 14 days as needed.    Behcet's syndrome (H)       * Notice:  This list has 2 medication(s) that are the same as other medications prescribed for you. Read the directions carefully, and ask your doctor or other care provider to review them with you.

## 2018-01-17 ENCOUNTER — PRE VISIT (OUTPATIENT)
Dept: GASTROENTEROLOGY | Facility: CLINIC | Age: 23
End: 2018-01-17

## 2018-01-17 ENCOUNTER — OFFICE VISIT (OUTPATIENT)
Dept: GASTROENTEROLOGY | Facility: CLINIC | Age: 23
End: 2018-01-17
Payer: COMMERCIAL

## 2018-01-17 VITALS
HEART RATE: 79 BPM | TEMPERATURE: 98.3 F | HEIGHT: 63 IN | DIASTOLIC BLOOD PRESSURE: 65 MMHG | SYSTOLIC BLOOD PRESSURE: 103 MMHG | OXYGEN SATURATION: 99 %

## 2018-01-17 DIAGNOSIS — D64.9 ANEMIA, UNSPECIFIED TYPE: ICD-10-CM

## 2018-01-17 DIAGNOSIS — K59.04 CHRONIC IDIOPATHIC CONSTIPATION: Primary | ICD-10-CM

## 2018-01-17 DIAGNOSIS — R10.12 ABDOMINAL PAIN, LEFT UPPER QUADRANT: ICD-10-CM

## 2018-01-17 LAB
BASOPHILS # BLD AUTO: 0 10E9/L (ref 0–0.2)
BASOPHILS NFR BLD AUTO: 0.3 %
DIFFERENTIAL METHOD BLD: NORMAL
EOSINOPHIL # BLD AUTO: 0.1 10E9/L (ref 0–0.7)
EOSINOPHIL NFR BLD AUTO: 2.4 %
ERYTHROCYTE [DISTWIDTH] IN BLOOD BY AUTOMATED COUNT: 13.7 % (ref 10–15)
FERRITIN SERPL-MCNC: 4 NG/ML (ref 12–150)
HCT VFR BLD AUTO: 37.7 % (ref 35–47)
HGB BLD-MCNC: 11.9 G/DL (ref 11.7–15.7)
IMM GRANULOCYTES # BLD: 0 10E9/L (ref 0–0.4)
IMM GRANULOCYTES NFR BLD: 0.2 %
IRON SATN MFR SERPL: 9 % (ref 15–46)
IRON SERPL-MCNC: 52 UG/DL (ref 35–180)
LYMPHOCYTES # BLD AUTO: 2.6 10E9/L (ref 0.8–5.3)
LYMPHOCYTES NFR BLD AUTO: 44.5 %
MCH RBC QN AUTO: 27.7 PG (ref 26.5–33)
MCHC RBC AUTO-ENTMCNC: 31.6 G/DL (ref 31.5–36.5)
MCV RBC AUTO: 88 FL (ref 78–100)
MONOCYTES # BLD AUTO: 0.3 10E9/L (ref 0–1.3)
MONOCYTES NFR BLD AUTO: 5.9 %
NEUTROPHILS # BLD AUTO: 2.7 10E9/L (ref 1.6–8.3)
NEUTROPHILS NFR BLD AUTO: 46.7 %
NRBC # BLD AUTO: 0 10*3/UL
NRBC BLD AUTO-RTO: 0 /100
PLATELET # BLD AUTO: 243 10E9/L (ref 150–450)
RBC # BLD AUTO: 4.29 10E12/L (ref 3.8–5.2)
TIBC SERPL-MCNC: 587 UG/DL (ref 240–430)
WBC # BLD AUTO: 5.8 10E9/L (ref 4–11)

## 2018-01-17 RX ORDER — LUBIPROSTONE 24 UG/1
24 CAPSULE ORAL 2 TIMES DAILY WITH MEALS
Qty: 30 CAPSULE | Refills: 1 | Status: SHIPPED | OUTPATIENT
Start: 2018-01-17 | End: 2018-06-01 | Stop reason: SINTOL

## 2018-01-17 ASSESSMENT — ENCOUNTER SYMPTOMS
ALTERED TEMPERATURE REGULATION: 1
HEMOPTYSIS: 0
BLOOD IN STOOL: 0
INSOMNIA: 1
ORTHOPNEA: 1
RECTAL PAIN: 0
SPUTUM PRODUCTION: 1
NECK PAIN: 1
HOARSE VOICE: 1
WEIGHT LOSS: 1
NAUSEA: 1
SHORTNESS OF BREATH: 1
WEIGHT GAIN: 1
EYE IRRITATION: 1
NECK MASS: 0
BRUISES/BLEEDS EASILY: 0
LIGHT-HEADEDNESS: 1
HYPOTENSION: 1
EYE WATERING: 1
ABDOMINAL PAIN: 1
POLYDIPSIA: 1
TROUBLE SWALLOWING: 1
NAIL CHANGES: 1
EYE PAIN: 1
PARALYSIS: 0
POOR WOUND HEALING: 1
SINUS PAIN: 1
PANIC: 0
DIZZINESS: 1
DIARRHEA: 1
MYALGIAS: 1
MUSCLE CRAMPS: 1
TINGLING: 1
JAUNDICE: 0
HEADACHES: 1
BLOATING: 1
HALLUCINATIONS: 0
SMELL DISTURBANCE: 0
BOWEL INCONTINENCE: 0
SEIZURES: 1
DEPRESSION: 0
FEVER: 1
INCREASED ENERGY: 1
SORE THROAT: 1
NUMBNESS: 1
MEMORY LOSS: 1
EYE REDNESS: 1
DECREASED CONCENTRATION: 1
HEARTBURN: 1
FATIGUE: 1
POSTURAL DYSPNEA: 1
PALPITATIONS: 1
HYPERTENSION: 0
SKIN CHANGES: 0
BACK PAIN: 1
EXERCISE INTOLERANCE: 1
WEAKNESS: 1
TREMORS: 1
CHILLS: 1
SYNCOPE: 1
LEG PAIN: 1
SINUS CONGESTION: 1
DYSPNEA ON EXERTION: 1
COUGH DISTURBING SLEEP: 0
COUGH: 0
NIGHT SWEATS: 1
MUSCLE WEAKNESS: 1
NERVOUS/ANXIOUS: 1
POLYPHAGIA: 0
JOINT SWELLING: 1
DISTURBANCES IN COORDINATION: 0
ARTHRALGIAS: 1
SNORES LOUDLY: 0
TASTE DISTURBANCE: 1
STIFFNESS: 1
DOUBLE VISION: 1
WHEEZING: 0
VOMITING: 1
SWOLLEN GLANDS: 1
SLEEP DISTURBANCES DUE TO BREATHING: 1
CONSTIPATION: 1
DECREASED APPETITE: 1
LOSS OF CONSCIOUSNESS: 1
SPEECH CHANGE: 0

## 2018-01-17 ASSESSMENT — PAIN SCALES - GENERAL: PAINLEVEL: SEVERE PAIN (7)

## 2018-01-17 NOTE — PROGRESS NOTES
GI CLINIC VISIT    CC/REFERRING MD:  Referred Self  REASON FOR CONSULTATION:     ASSESSMENT/PLAN:  Samara is a 21 yo woman with chronic constipation, chronic left upper quadrant pain and likely mobile cecum presenting for evaluation.  I suspect that her constipation is a root of many of her complaints, including abdominal pain, nausea and vomiting.  She had a normal EGD, cscope. Except for a mobile cecum, CT c/a/p was normal. She also reports having a capsule study that was normal.     I recommend conservative management for constipation as outlined below.     # Chronic, idiopathic constipation vs. outlet obstruction due to pelvic floor dysfunction  --Referral to pelvic floor center for ARM with possible biofeedback, given abnormal MARIAN today  --Will plan to start Amitiza, since Linzess was only minimally effective. Start at 24 mcg daily, OK to increase to BID.     # Anemia, new  Likely 2/2 menstrual blood losses.   --Check CBC, iron studies, celiac studies     RTC: Return in about 10 weeks (around 3/28/2018).     A total of 60 minutes, face to face, was spent with this patient, >50% of which was counseling regarding the above delineated issues.    cc Dr. Coburn, PCP    Estela Cuenca MD   of Medicine  Division of Gastroenterology, Hepatology and Nutrition  [facility]      HPI  Samara is a 21 yo woman presenting to GI clinic for evaluation of nausea, life-long constipation (since 8 weeks old). She is accompanied today by her mother, Jinny.     Nausea is constant, worsened by light, smell of food, fragrances, odors. Vomits 1-2 times per week. Vomiting occurs more often in the setting of eating. Endorses abdominal distension and early satiety.   Endorses episodes of abdominal pain in the left upper quadrant. The etiology has been previously worked up with cross sectional imaging, and she was found to have mobile cecum. Reports migratory abdominal pain (R-->L) on a constant  basis. No triggers.  Saw a general surgeon for mobile cecum repair.    Has a BM between every 6-14 days. Had dark red blood in the stool (cscope was normal, see below).  Seen in the ED. Didn't feel better after cleaning out for colonoscopy.  No improvement in nausea with BMs. Endorses a sense of incomplete evacuation, excessive straining. Stool is usually hard ([place] type 1). No digital maneuvers. Takes Linzess 290 mcg every morning, with minimal improvement.      Has not had any abdominal surgeries.  Scheduled to see Dr. Coburn ([facility]) regarding mobile cecum on 1/29.     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     Weight has fluctuated.  Recently, gained 3 lb or so since Nov 2017.     Diet:  B: skips  L: Atkins shake   3pm: apple or strawberries  D: cream of wheat     ROS:    No fevers or chills  No weight loss  No blurry vision, double vision or change in vision  No sore throat  No lymphadenopathy  No headache, paraesthesias, or weakness in a limb  No shortness of breath or wheezing  No chest pain or pressure  No arthralgias or myalgias  No rashes or skin changes  No odynophagia or dysphagia  No current BRBPR, hematochezia, melena  No dysuria, frequency or urgency  No hot/cold intolerance or polyria  No anxiety or depression    PROBLEM LIST  Patient Active Problem List    Diagnosis Date Noted     Spells of decreased attentiveness 12/19/2017     Priority: Medium     Mobile cecum 11/09/2017     Priority: Medium     Cecum noted in Right lower quadrant on 4/17 CT scan, and in Left upper Quadrant on CT on 11/2017.       Vitamin D deficiency 10/11/2017     Priority: Medium     How low, unknown/not found. On D when tested at 27. Starting cholecalciferol October 2017. Needs recheck.       Convulsions, unspecified convulsion type (H) 10/03/2017     Priority: Medium     Transient alteration of awareness 10/03/2017     Priority: Medium     Chronic pain syndrome 07/27/2017     Priority: Medium     Major depressive  disorder, recurrent episode, moderate (H) 06/27/2017     Priority: Medium     Cervical pain 05/02/2017     Priority: Medium     Acute left ankle pain 03/31/2017     Priority: Medium     Cervical dystonia 03/28/2017     Priority: Medium     PTSD (post-traumatic stress disorder) 01/17/2017     Priority: Medium     Patellofemoral instability 10/20/2016     Priority: Medium     Fibromyalgia 08/04/2016     Priority: Medium     Rheumatoid arthritis of multiple sites without rheumatoid factor (H) 08/04/2016     Priority: Medium     Raynaud's disease without gangrene 08/04/2016     Priority: Medium     Chronic abdominal pain 08/04/2016     Priority: Medium     Palpitations 01/12/2016     Priority: Medium     On colchicine therapy 10/30/2015     Priority: Medium     Spell of shaking 05/06/2015     Priority: Medium     Migraine 02/04/2015     Priority: Medium     Problem list name updated by automated process. Provider to review       Behcet's disease (H) 12/10/2014     Priority: Medium     Headaches due to old head injury 11/12/2013     Priority: Medium     Milk protein intolerance 10/11/2013     Priority: Medium     Concussion 02/13/2013     Priority: Medium     Jan 2013, with prolonged recovery- followed by sports med         Knee pain 01/03/2013     Priority: Medium     TAHIR (generalized anxiety disorder) 06/25/2009     Priority: Medium     Tics - Tourette syndrome 05/18/2009     Priority: Medium     Followed by psychotherapy. Symptoms well managed. Originally diagnosed at [facility]. (Dr. Simpson)           IBS (irritable bowel syndrome) 05/18/2009     Priority: Medium     Seasonal allergic rhinitis 05/18/2009     Priority: Medium       PERTINENT PAST MEDICAL HISTORY:  Past Medical History:   Diagnosis Date     Anxiety      Arthritis      Behcet's disease (H)      Cervical adenitis May 2010     Chronic abdominal pain      Constipation, chronic 1994     Gastro-oesophageal reflux disease      Gastroparesis       Migraines      Neuromuscular disorder (H)     fibramyalgia     Palpitations      Seizure (H)      Seizures (H)     unknown etiology     Syncope      Tourette's        PREVIOUS SURGERIES:  Past Surgical History:   Procedure Laterality Date     ARTHROSCOPY KNEE WITH PATELLAR REALIGNMENT  7/25/2013    Procedure: ARTHROSCOPY KNEE WITH PATELLAR REALIGNMENT;  Left Knee Arthroscopy, Medial Patellofemoral Ligament Reconstruction with Allograft  ;  Surgeon: Jennifer Acevedo MD;  Location: [facility]     COLONOSCOPY  2015     DENTAL SURGERY  1996    Teeth removal     ENDOSCOPY UPPER, COLONOSCOPY, COMBINED  2005     HC ESOPH/GAS REFLUX TEST W NASAL IMPED >1 HR N/A 2/15/2017    Procedure: ESOPHAGEAL IMPEDENCE FUNCTION TEST WITH 24 HOUR PH GREATER THAN 1 HOUR;  Surgeon: Timothy Matta MD;  Location: [facility]       PREVIOUS ENDOSCOPY:  cscope 1/3/2018 (abd pain): normal, good prep with required extensive water lavage   EGD 1/3/2018 (abd pain): normal   esophageal manometry/impedance 2/2017: normal    ALLERGIES:     Allergies   Allergen Reactions     Amoxil [Penicillins] Rash     Dad unsure of reaction.     Bee Venom Anaphylaxis     Contrast Dye Rash     Contrast [facility] [place], 04/09/2014.; Contrast [facility] [place], 04/09/2014.  NOTE: this is a contrast media oral with iodine. Premedicate with methylpred standard for IV contrast, request barium contrast for oral contrast.     Kiwi Swelling     Orange Fruit [Citrus] Anaphylaxis     Pineapple Anaphylaxis, Difficulty breathing and Rash     Reglan [Metoclopramide] Other (See Comments)     IV dose only, in ER, rapid heart rate.     Ace Inhibitors      Difficulty in breathing and GI upset     Amitiza [Lubiprostone] Nausea and Vomiting     Amoxicillin-Pot Clavulanate      Latex      Midazolam Unknown     Other reaction(s): Unknown  parent states that when pt takes this medication, she wakes up being very violent .  parent states that when pt takes this medication, she  wakes up being very violent .     No Clinical Screening - See Comments      Bleech/ chest tightness, itchy throat, swollen tongue, hives     Versed      Coming out of pelvic exam at age of 5, was kicking and screaming when coming out of the versed.     Adhesive Tape Rash     Azithromycin Hives and Rash     Cephalexin Itching and Rash     Itchy mouth  Itchy mouth     Keflex [Cephalexin-Fd&C Yellow #6] Hives       PERTINENT MEDICATIONS:    Current Outpatient Prescriptions:      lubiprostone (AMITIZA) 24 MCG capsule, Take 1 capsule (24 mcg) by mouth 2 times daily (with meals), Disp: 30 capsule, Rfl: 1     benzocaine (TOPICALE XTRA) 20 % GEL, Apply as needed locally to mouth or nasal ulcers for pain; 4 times daily as needed, Disp: 30 g, Rfl: 1     colchicine 0.6 MG tablet, TAKE 2 TABLETS BY MOUTH EVERY MORNING AND 1 TABLET EVERY EVENING., Disp: 270 tablet, Rfl: 0     apremilast (OTEZLA) 30 MG tablet, Take 1 tablet (30 mg) by mouth 2 times daily, Disp: , Rfl:      linaclotide (LINZESS) 145 MCG capsule, Take 1 capsule (145 mcg) by mouth every morning (before breakfast), Disp: 90 capsule, Rfl: 1     dicyclomine (BENTYL) 20 MG tablet, Take 1 tablet (20 mg) by mouth 4 times daily as needed, Disp: 120 tablet, Rfl: 3     aspirin-acetaminophen-caffeine (EXCEDRIN MIGRAINE) 250-250-65 MG per tablet, Take 1 tablet by mouth every 6 hours as needed for headaches, Disp: , Rfl:      SPRINTEC 28 0.25-35 MG-MCG per tablet, TAKE 1 TABLET BY MOUTH ONCE DAILY., Disp: 84 tablet, Rfl: 2     hydrOXYzine (ATARAX) 25 MG tablet, Take 1-2 tablets (25-50 mg) by mouth every 6 hours as needed for anxiety, Disp: 90 tablet, Rfl: 1     prazosin (MINIPRESS) 1 MG capsule, Take 1 capsule (1 mg) by mouth At Bedtime For nightmares., Disp: 30 capsule, Rfl: 1     OnabotulinumtoxinA (BOTOX IJ), Inject 165 Units into the muscle once Lot /C3 Exp 06/2020, Disp: , Rfl:      guanFACINE (TENEX) 1 MG tablet, Take 3 tablets (3 mg) by mouth 2 times daily, Disp:  270 tablet, Rfl: 3     diltiazem 2% in PLO cream, FV COMPOUNDED, 2% GEL, Apply small pea size amount three times daily to anus until pain is gone., Disp: 30 g, Rfl: 1     COMPOUNDED NON-CONTROLLED SUBSTANCE (CMPD RX) - PHARMACY TO MIX COMPOUNDED MEDICATION, Take one capsule in the morning, Disp: 30 capsule, Rfl: 0     amitriptyline (ELAVIL) 50 MG tablet, Take 1 tablet (50 mg) by mouth At Bedtime, Disp: 30 tablet, Rfl: 11     lactulose 20 GM/30ML SOLN, Take 30 mLs by mouth 3 times daily as needed, Disp: 300 mL, Rfl: 3     LORazepam (ATIVAN) 1 MG tablet, Take 0.5 tablets (0.5 mg) by mouth 2 times daily as needed for other (atypical chest pain) Do not operate a vehicle after taking this medication, Disp: 60 tablet, Rfl: 0     ondansetron (ZOFRAN-ODT) 8 MG ODT tab, Take 1 tablet (8 mg) by mouth every 8 hours as needed for nausea, Disp: 60 tablet, Rfl: 6     diphenhydrAMINE (BENADRYL) 25 MG tablet, Take 1 tablet (25 mg) by mouth every 8 hours as needed for allergies, Disp: 30 tablet, Rfl: 0     sucralfate (CARAFATE) 1 GM/10ML suspension, Take 10 mLs (1 g) by mouth 4 times daily, Disp: 1200 mL, Rfl: 2     diclofenac (VOLTAREN) 1 % GEL topical gel, Apply 2-4 grams to affected area(s) up to 4 times per day as needed. This is an anti-inflammatory medication., Disp: 100 g, Rfl: 4     aspirin (ASPIRIN CHILDRENS) 81 MG chewable tablet, Take 81 mg by mouth as needed , Disp: , Rfl:      omeprazole (PRILOSEC) 40 MG capsule, Take 1 capsule (40 mg) by mouth daily, Disp: 90 capsule, Rfl: 3     EPINEPHrine (EPIPEN 2-EAMON) 0.3 MG/0.3ML injection, Inject 0.3 mLs (0.3 mg) into the muscle as needed for anaphylaxis, Disp: 0.6 mL, Rfl: 3     albuterol (PROAIR HFA/PROVENTIL HFA/VENTOLIN HFA) 108 (90 BASE) MCG/ACT Inhaler, Inhale 2 puffs into the lungs every 6 hours as needed for shortness of breath / dyspnea or wheezing, Disp: 1 Inhaler, Rfl: 1     Magic Mouthwash (FV std formula) lidocaine visc 2% 2.5mL/5mL & maalox/mylanta w/ simeth  "2.5mL/5mL & diphenhydrAMINE 5mg/5mL, Swish and swallow 10 mLs in mouth every 6 hours as needed for mouth sores, Disp: 1 Bottle, Rfl: 1     clobetasol (TEMOVATE) 0.05 % cream, Apply topically 2 times daily, Disp: 60 g, Rfl: 0     botulinum toxin type A (BOTOX) 100 UNITS injection, Inject 200 Units into the muscle every 3 months, Disp: 200 Units, Rfl: 3     hyoscyamine (ANASPAZ,LEVSIN) 0.125 MG tablet, Take 1-2 tablets (125-250 mcg) by mouth every 4 hours as needed for cramping, Disp: 40 tablet, Rfl: 1     hypromellose (GENTEAL) 0.3 % SOLN, 1 drop every hour as needed for dry eyes , Disp: , Rfl:      betamethasone valerate (VALISONE) 0.1 % cream, Apply topically 2 times daily, Disp: , Rfl:      carbamide peroxide (DEBROX) 6.5 % otic solution, 5 drops 2 times daily, Disp: , Rfl:      Lactase (LACTAID PO), Take by mouth daily, Disp: , Rfl:      lidocaine (XYLOCAINE) 2 % jelly, Apply 1 Tube topically daily Reported on 4/21/2017, Disp: , Rfl:      triamcinolone (KENALOG) 0.1 % cream, Apply sparingly to oral ulcers three times daily for 14 days as needed., Disp: 15 g, Rfl: 1    SOCIAL HISTORY:  Social History     Social History     Marital status: Single     Spouse name: N/A     Number of children: N/A     Years of education: N/A     Occupational History     Not on file.     Social History Main Topics     Smoking status: Never Smoker     Smokeless tobacco: Never Used     Alcohol use 0.6 oz/week     1 Standard drinks or equivalent per week      Comment: rarely     Drug use: No     Sexual activity: Yes     Partners: Male     Birth control/ protection: Pill     Other Topics Concern     Not on file     Social History Narrative    Boyfriend of 5 years, living with \"in laws\" Oct 2017 and looking forward to their own apartment.        FAMILY HISTORY:  Family History   Problem Relation Age of Onset     Depression Mother      Neurologic Disorder Mother      Migraines, take imitrex injection.  Also in maternal grandmother.       " "Alcohol/Drug Father      Hypertension Father      Depression Father      Cardiovascular Maternal Grandmother      Depression Maternal Grandmother      Hypertension Maternal Grandmother      Alzheimer Disease Maternal Grandmother      Cardiovascular Maternal Grandfather      Hypertension Maternal Grandfather      Depression Maternal Grandfather      Alcohol/Drug Maternal Grandfather      Cardiovascular Paternal Grandmother      Hypertension Paternal Grandmother      Cardiovascular Paternal Grandfather      Hypertension Paternal Grandfather      Glaucoma No family hx of      Macular Degeneration No family hx of        Past/family/social history reviewed and no changes    PHYSICAL EXAMINATION:  Constitutional: aaox3, cooperative, pleasant, not dyspneic/diaphoretic, no acute distress  Vitals reviewed: /65 (BP Location: Left arm, Patient Position: Chair, Cuff Size: Adult Regular)  Pulse 79  Temp 98.3  F (36.8  C)  Ht 1.6 m (5' 3\")  LMP 01/07/2018  SpO2 99%  Wt:   Wt Readings from Last 2 Encounters:   01/16/18 68 kg (150 lb)   01/10/18 68 kg (150 lb)      Eyes: Sclera anicteric/injected  Ears/nose/mouth/throat: Normal oropharynx without ulcers or exudate, mucus membranes moist, hearing intact  Neck: supple, thyroid normal size  CV: No edema  Respiratory: Unlabored breathing  Lymph: No axillary, submandibular, supraclavicular or inguinal lymphadenopathy  Abd: scar at umbilicus. Tattoo in left lower quadrant. Soft, nondistended, +bs, no hepatosplenomegaly, mild TTP in epigastric area and LUQ, no peritoneal signs  Skin: warm, perfused, no jaundice  Psych: Normal affect  MSK: Normal gait  Rectum: normal sphincter tone. Normal squeeze on command, at the end of the relaxation phase, patient briefly squeezes paradoxically. There is absent abdominal contraction and there is paradoxical squeeze at the end of attempt to expel finger. Normal perineal descent.      PERTINENT STUDIES:  CT 11/3/2017: mobile cecum in LUQ (was " "in RUQ on 4/2017)  CT 1/8/2016: normal   UGIS 1/15: normal, delayed colon transit, significant stool burden     Labs 1/8 showed Hgb 10.9, MCV 88, RDW 13.9  12/2017: crp 12, esr 13   CMP normal     Documentation Only on 01/03/2018   Component Date Value Ref Range Status     Copath Report 01/03/2018    Final                    Value:Patient Name: LUCINA BAH  MR#: [id number]  Specimen #:   Collected: 1/3/2018  Received: 1/4/2018  Reported: 1/8/2018 10:28  Ordering Phy(s): HELEN IVAN  Additional Phy(s): [facility]: [facility]    For improved result formatting, select 'View Enhanced Report Format' under   Linked Documents section.    SPECIMEN(S):  Esophageal nodule    FINAL DIAGNOSIS:  ESOPHAGEAL NODULE, BIOPSY:  - Fragments of squamous and columnar mucosa consistent with the   squamocolumnar junction. - The squamous mucosa  is unremarkable with no evidence of reflux or other abnormality  - The columnar mucosa is gastric Cardia with mild chronic inflammation  - No evidence of intestinal metaplasia, dysplasia or malignancy    COMMENT:  Multiple tissue levels were cut on this case and no definitive cause for a   nodule was identified  microscopically.    I have personally reviewed all specimens and/or slides, including the   listed special stains, and used them  with my medical ju                          dgement to determine or confirm the final diagnosis.    Electronically signed out by:    Kasi Orosco M.D., [facility]    CLINICAL HISTORY:  22-year-old female with abdominal pain failing to respond to treatment.  GROSS:  The specimen is received in formalin with proper patient identification,   labeled \"esophagus nodule\".  The  specimen consists of a 0.2 cm in greatest dimension white soft tissue   fragment.  The specimen is wrapped and  entirely submitted in cassette A1. (Dictated by: [facility]   1/4/2018 10:02 AM)    MICROSCOPIC:  Microscopic examination was " performed.    CPT Codes:  A: 11227-SD3    TESTING LAB LOCATION:  [facility], [address]   [id number] [phone]    COLLECTION SITE:  Client[facility]  [facility]

## 2018-01-17 NOTE — PATIENT INSTRUCTIONS
Dear Samara,    Thank you for coming in today. As discussed, the plan will be:  ---Blood work today  ---Referral to the pelvic floor center for pelvic floor testing   ---Start Amitiza (instead of Linzess). Start with one pill daily, increase to twice daily if no effect.     Return for follow up visit in 2-3 months.

## 2018-01-17 NOTE — NURSING NOTE
"No chief complaint on file.      Vitals:    01/17/18 1420   BP: 103/65   BP Location: Left arm   Patient Position: Chair   Cuff Size: Adult Regular   Pulse: 79   Temp: 98.3  F (36.8  C)   SpO2: 99%   Height: 5' 3\"       There is no height or weight on file to calculate BMI.    Linad Kraus                        "

## 2018-01-17 NOTE — LETTER
1/17/2018       RE: Samara Oropeza  [address]   [place] [address]     Dear Colleague,    Thank you for referring your patient, Samara Oropeza, to the [facility] at [facility]. Please see a copy of my visit note below.    GI CLINIC VISIT    CC/REFERRING MD:  Referred Self  REASON FOR CONSULTATION:     ASSESSMENT/PLAN:  Samara is a 21 yo woman with chronic constipation, chronic left upper quadrant pain and likely mobile cecum presenting for evaluation.  I suspect that her constipation is a root of many of her complaints, including abdominal pain, nausea and vomiting.  She had a normal EGD, cscope. Except for a mobile cecum, CT c/a/p was normal. She also reports having a capsule study that was normal.     I recommend conservative management for constipation as outlined below.     # Chronic, idiopathic constipation vs. outlet obstruction due to pelvic floor dysfunction  --Referral to pelvic floor center for ARM with possible biofeedback, given abnormal MARIAN today  --Will plan to start Amitiza, since Linzess was only minimally effective. Start at 24 mcg daily, OK to increase to BID.     # Anemia, new  Likely 2/2 menstrual blood losses.   --Check CBC, iron studies, celiac studies     RTC: Return in about 10 weeks (around 3/28/2018).     A total of 60 minutes, face to face, was spent with this patient, >50% of which was counseling regarding the above delineated issues.    cc Dr. Coburn, PCP    Estela Cuenca MD   of Medicine  Division of Gastroenterology, Hepatology and Nutrition  [facility]      JENNIFER Pascual is a 21 yo woman presenting to GI clinic for evaluation of nausea, life-long constipation (since 8 weeks old). She is accompanied today by her mother, Jinny.     Nausea is constant, worsened by light, smell of food, fragrances, odors. Vomits 1-2 times per week. Vomiting occurs more  often in the setting of eating. Endorses abdominal distension and early satiety.   Endorses episodes of abdominal pain in the left upper quadrant. The etiology has been previously worked up with cross sectional imaging, and she was found to have mobile cecum. Reports migratory abdominal pain (R-->L) on a constant basis. No triggers.  Saw a general surgeon for mobile cecum repair.    Has a BM between every 6-14 days. Had dark red blood in the stool (cscope was normal, see below).  Seen in the ED. Didn't feel better after cleaning out for colonoscopy.  No improvement in nausea with BMs. Endorses a sense of incomplete evacuation, excessive straining. Stool is usually hard (Scotia type 1). No digital maneuvers. Takes Linzess 290 mcg every morning, with minimal improvement.      Has not had any abdominal surgeries.  Scheduled to see Dr. Coburn ([facility]) regarding mobile cecum on 1/29.     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     Weight has fluctuated.  Recently, gained 3 lb or so since Nov 2017.     Diet:  B: skips  L: Atkins shake   3pm: apple or strawberries  D: cream of wheat     ROS:    No fevers or chills  No weight loss  No blurry vision, double vision or change in vision  No sore throat  No lymphadenopathy  No headache, paraesthesias, or weakness in a limb  No shortness of breath or wheezing  No chest pain or pressure  No arthralgias or myalgias  No rashes or skin changes  No odynophagia or dysphagia  No current BRBPR, hematochezia, melena  No dysuria, frequency or urgency  No hot/cold intolerance or polyria  No anxiety or depression    PROBLEM LIST  Patient Active Problem List    Diagnosis Date Noted     Spells of decreased attentiveness 12/19/2017     Priority: Medium     Mobile cecum 11/09/2017     Priority: Medium     Cecum noted in Right lower quadrant on 4/17 CT scan, and in Left upper Quadrant on CT on 11/2017.       Vitamin D deficiency 10/11/2017     Priority: Medium     How low, unknown/not  found. On D when tested at 27. Starting cholecalciferol October 2017. Needs recheck.       Convulsions, unspecified convulsion type (H) 10/03/2017     Priority: Medium     Transient alteration of awareness 10/03/2017     Priority: Medium     Chronic pain syndrome 07/27/2017     Priority: Medium     Major depressive disorder, recurrent episode, moderate (H) 06/27/2017     Priority: Medium     Cervical pain 05/02/2017     Priority: Medium     Acute left ankle pain 03/31/2017     Priority: Medium     Cervical dystonia 03/28/2017     Priority: Medium     PTSD (post-traumatic stress disorder) 01/17/2017     Priority: Medium     Patellofemoral instability 10/20/2016     Priority: Medium     Fibromyalgia 08/04/2016     Priority: Medium     Rheumatoid arthritis of multiple sites without rheumatoid factor (H) 08/04/2016     Priority: Medium     Raynaud's disease without gangrene 08/04/2016     Priority: Medium     Chronic abdominal pain 08/04/2016     Priority: Medium     Palpitations 01/12/2016     Priority: Medium     On colchicine therapy 10/30/2015     Priority: Medium     Spell of shaking 05/06/2015     Priority: Medium     Migraine 02/04/2015     Priority: Medium     Problem list name updated by automated process. Provider to review       Behcet's disease (H) 12/10/2014     Priority: Medium     Headaches due to old head injury 11/12/2013     Priority: Medium     Milk protein intolerance 10/11/2013     Priority: Medium     Concussion 02/13/2013     Priority: Medium     Jan 2013, with prolonged recovery- followed by sports med         Knee pain 01/03/2013     Priority: Medium     TAHIR (generalized anxiety disorder) 06/25/2009     Priority: Medium     Tics - Tourette syndrome 05/18/2009     Priority: Medium     Followed by psychotherapy. Symptoms well managed. Originally diagnosed at [facility]. (Dr. Simpson)           IBS (irritable bowel syndrome) 05/18/2009     Priority: Medium     Seasonal allergic rhinitis  05/18/2009     Priority: Medium       PERTINENT PAST MEDICAL HISTORY:  Past Medical History:   Diagnosis Date     Anxiety      Arthritis      Behcet's disease (H)      Cervical adenitis May 2010     Chronic abdominal pain      Constipation, chronic 1994     Gastro-oesophageal reflux disease      Gastroparesis      Migraines      Neuromuscular disorder (H)     fibramyalgia     Palpitations      Seizure (H)      Seizures (H)     unknown etiology     Syncope      Tourette's        PREVIOUS SURGERIES:  Past Surgical History:   Procedure Laterality Date     ARTHROSCOPY KNEE WITH PATELLAR REALIGNMENT  7/25/2013    Procedure: ARTHROSCOPY KNEE WITH PATELLAR REALIGNMENT;  Left Knee Arthroscopy, Medial Patellofemoral Ligament Reconstruction with Allograft  ;  Surgeon: Jennifer Acevedo MD;  Location: [facility]     COLONOSCOPY  2015     DENTAL SURGERY  1996    Teeth removal     ENDOSCOPY UPPER, COLONOSCOPY, COMBINED  2005     HC ESOPH/GAS REFLUX TEST W NASAL IMPED >1 HR N/A 2/15/2017    Procedure: ESOPHAGEAL IMPEDENCE FUNCTION TEST WITH 24 HOUR PH GREATER THAN 1 HOUR;  Surgeon: Timothy Matta MD;  Location: [facility]       PREVIOUS ENDOSCOPY:  cscope 1/3/2018 (abd pain): normal, good prep with required extensive water lavage   EGD 1/3/2018 (abd pain): normal   esophageal manometry/impedance 2/2017: normal    ALLERGIES:     Allergies   Allergen Reactions     Amoxil [Penicillins] Rash     Dad unsure of reaction.     Bee Venom Anaphylaxis     Contrast Dye Rash     Contrast [facility] [place], 04/09/2014.; Contrast [facility] [place], 04/09/2014.  NOTE: this is a contrast media oral with iodine. Premedicate with methylpred standard for IV contrast, request barium contrast for oral contrast.     Kiwi Swelling     Orange Fruit [Citrus] Anaphylaxis     Pineapple Anaphylaxis, Difficulty breathing and Rash     Reglan [Metoclopramide] Other (See Comments)     IV dose only, in ER, rapid heart rate.     Ace Inhibitors       Difficulty in breathing and GI upset     Amitiza [Lubiprostone] Nausea and Vomiting     Amoxicillin-Pot Clavulanate      Latex      Midazolam Unknown     Other reaction(s): Unknown  parent states that when pt takes this medication, she wakes up being very violent .  parent states that when pt takes this medication, she wakes up being very violent .     No Clinical Screening - See Comments      Bleech/ chest tightness, itchy throat, swollen tongue, hives     Versed      Coming out of pelvic exam at age of 5, was kicking and screaming when coming out of the versed.     Adhesive Tape Rash     Azithromycin Hives and Rash     Cephalexin Itching and Rash     Itchy mouth  Itchy mouth     Keflex [Cephalexin-Fd&C Yellow #6] Hives       PERTINENT MEDICATIONS:    Current Outpatient Prescriptions:      lubiprostone (AMITIZA) 24 MCG capsule, Take 1 capsule (24 mcg) by mouth 2 times daily (with meals), Disp: 30 capsule, Rfl: 1     benzocaine (TOPICALE XTRA) 20 % GEL, Apply as needed locally to mouth or nasal ulcers for pain; 4 times daily as needed, Disp: 30 g, Rfl: 1     colchicine 0.6 MG tablet, TAKE 2 TABLETS BY MOUTH EVERY MORNING AND 1 TABLET EVERY EVENING., Disp: 270 tablet, Rfl: 0     apremilast (OTEZLA) 30 MG tablet, Take 1 tablet (30 mg) by mouth 2 times daily, Disp: , Rfl:      linaclotide (LINZESS) 145 MCG capsule, Take 1 capsule (145 mcg) by mouth every morning (before breakfast), Disp: 90 capsule, Rfl: 1     dicyclomine (BENTYL) 20 MG tablet, Take 1 tablet (20 mg) by mouth 4 times daily as needed, Disp: 120 tablet, Rfl: 3     aspirin-acetaminophen-caffeine (EXCEDRIN MIGRAINE) 250-250-65 MG per tablet, Take 1 tablet by mouth every 6 hours as needed for headaches, Disp: , Rfl:      SPRINTEC 28 0.25-35 MG-MCG per tablet, TAKE 1 TABLET BY MOUTH ONCE DAILY., Disp: 84 tablet, Rfl: 2     hydrOXYzine (ATARAX) 25 MG tablet, Take 1-2 tablets (25-50 mg) by mouth every 6 hours as needed for anxiety, Disp: 90 tablet, Rfl: 1      prazosin (MINIPRESS) 1 MG capsule, Take 1 capsule (1 mg) by mouth At Bedtime For nightmares., Disp: 30 capsule, Rfl: 1     OnabotulinumtoxinA (BOTOX IJ), Inject 165 Units into the muscle once Lot /C3 Exp 06/2020, Disp: , Rfl:      guanFACINE (TENEX) 1 MG tablet, Take 3 tablets (3 mg) by mouth 2 times daily, Disp: 270 tablet, Rfl: 3     diltiazem 2% in PLO cream, FV COMPOUNDED, 2% GEL, Apply small pea size amount three times daily to anus until pain is gone., Disp: 30 g, Rfl: 1     COMPOUNDED NON-CONTROLLED SUBSTANCE (CMPD RX) - PHARMACY TO MIX COMPOUNDED MEDICATION, Take one capsule in the morning, Disp: 30 capsule, Rfl: 0     amitriptyline (ELAVIL) 50 MG tablet, Take 1 tablet (50 mg) by mouth At Bedtime, Disp: 30 tablet, Rfl: 11     lactulose 20 GM/30ML SOLN, Take 30 mLs by mouth 3 times daily as needed, Disp: 300 mL, Rfl: 3     LORazepam (ATIVAN) 1 MG tablet, Take 0.5 tablets (0.5 mg) by mouth 2 times daily as needed for other (atypical chest pain) Do not operate a vehicle after taking this medication, Disp: 60 tablet, Rfl: 0     ondansetron (ZOFRAN-ODT) 8 MG ODT tab, Take 1 tablet (8 mg) by mouth every 8 hours as needed for nausea, Disp: 60 tablet, Rfl: 6     diphenhydrAMINE (BENADRYL) 25 MG tablet, Take 1 tablet (25 mg) by mouth every 8 hours as needed for allergies, Disp: 30 tablet, Rfl: 0     sucralfate (CARAFATE) 1 GM/10ML suspension, Take 10 mLs (1 g) by mouth 4 times daily, Disp: 1200 mL, Rfl: 2     diclofenac (VOLTAREN) 1 % GEL topical gel, Apply 2-4 grams to affected area(s) up to 4 times per day as needed. This is an anti-inflammatory medication., Disp: 100 g, Rfl: 4     aspirin (ASPIRIN CHILDRENS) 81 MG chewable tablet, Take 81 mg by mouth as needed , Disp: , Rfl:      omeprazole (PRILOSEC) 40 MG capsule, Take 1 capsule (40 mg) by mouth daily, Disp: 90 capsule, Rfl: 3     EPINEPHrine (EPIPEN 2-EAMON) 0.3 MG/0.3ML injection, Inject 0.3 mLs (0.3 mg) into the muscle as needed for anaphylaxis, Disp: 0.6  mL, Rfl: 3     albuterol (PROAIR HFA/PROVENTIL HFA/VENTOLIN HFA) 108 (90 BASE) MCG/ACT Inhaler, Inhale 2 puffs into the lungs every 6 hours as needed for shortness of breath / dyspnea or wheezing, Disp: 1 Inhaler, Rfl: 1     Magic Mouthwash (FV std formula) lidocaine visc 2% 2.5mL/5mL & maalox/mylanta w/ simeth 2.5mL/5mL & diphenhydrAMINE 5mg/5mL, Swish and swallow 10 mLs in mouth every 6 hours as needed for mouth sores, Disp: 1 Bottle, Rfl: 1     clobetasol (TEMOVATE) 0.05 % cream, Apply topically 2 times daily, Disp: 60 g, Rfl: 0     botulinum toxin type A (BOTOX) 100 UNITS injection, Inject 200 Units into the muscle every 3 months, Disp: 200 Units, Rfl: 3     hyoscyamine (ANASPAZ,LEVSIN) 0.125 MG tablet, Take 1-2 tablets (125-250 mcg) by mouth every 4 hours as needed for cramping, Disp: 40 tablet, Rfl: 1     hypromellose (GENTEAL) 0.3 % SOLN, 1 drop every hour as needed for dry eyes , Disp: , Rfl:      betamethasone valerate (VALISONE) 0.1 % cream, Apply topically 2 times daily, Disp: , Rfl:      carbamide peroxide (DEBROX) 6.5 % otic solution, 5 drops 2 times daily, Disp: , Rfl:      Lactase (LACTAID PO), Take by mouth daily, Disp: , Rfl:      lidocaine (XYLOCAINE) 2 % jelly, Apply 1 Tube topically daily Reported on 4/21/2017, Disp: , Rfl:      triamcinolone (KENALOG) 0.1 % cream, Apply sparingly to oral ulcers three times daily for 14 days as needed., Disp: 15 g, Rfl: 1    SOCIAL HISTORY:  Social History     Social History     Marital status: Single     Spouse name: N/A     Number of children: N/A     Years of education: N/A     Occupational History     Not on file.     Social History Main Topics     Smoking status: Never Smoker     Smokeless tobacco: Never Used     Alcohol use 0.6 oz/week     1 Standard drinks or equivalent per week      Comment: rarely     Drug use: No     Sexual activity: Yes     Partners: Male     Birth control/ protection: Pill     Other Topics Concern     Not on file     Social History  "Narrative    Boyfriend of 5 years, living with \"in laws\" Oct 2017 and looking forward to their own apartment.        FAMILY HISTORY:  Family History   Problem Relation Age of Onset     Depression Mother      Neurologic Disorder Mother      Migraines, take imitrex injection.  Also in maternal grandmother.       Alcohol/Drug Father      Hypertension Father      Depression Father      Cardiovascular Maternal Grandmother      Depression Maternal Grandmother      Hypertension Maternal Grandmother      Alzheimer Disease Maternal Grandmother      Cardiovascular Maternal Grandfather      Hypertension Maternal Grandfather      Depression Maternal Grandfather      Alcohol/Drug Maternal Grandfather      Cardiovascular Paternal Grandmother      Hypertension Paternal Grandmother      Cardiovascular Paternal Grandfather      Hypertension Paternal Grandfather      Glaucoma No family hx of      Macular Degeneration No family hx of        Past/family/social history reviewed and no changes    PHYSICAL EXAMINATION:  Constitutional: aaox3, cooperative, pleasant, not dyspneic/diaphoretic, no acute distress  Vitals reviewed: /65 (BP Location: Left arm, Patient Position: Chair, Cuff Size: Adult Regular)  Pulse 79  Temp 98.3  F (36.8  C)  Ht 1.6 m (5' 3\")  LMP 01/07/2018  SpO2 99%  Wt:   Wt Readings from Last 2 Encounters:   01/16/18 68 kg (150 lb)   01/10/18 68 kg (150 lb)      Eyes: Sclera anicteric/injected  Ears/nose/mouth/throat: Normal oropharynx without ulcers or exudate, mucus membranes moist, hearing intact  Neck: supple, thyroid normal size  CV: No edema  Respiratory: Unlabored breathing  Lymph: No axillary, submandibular, supraclavicular or inguinal lymphadenopathy  Abd: scar at umbilicus. Tattoo in left lower quadrant. Soft, nondistended, +bs, no hepatosplenomegaly, mild TTP in epigastric area and LUQ, no peritoneal signs  Skin: warm, perfused, no jaundice  Psych: Normal affect  MSK: Normal gait  Rectum: normal " sphincter tone. Normal squeeze on command, at the end of the relaxation phase, patient briefly squeezes paradoxically. There is absent abdominal contraction and there is paradoxical squeeze at the end of attempt to expel finger. Normal perineal descent.      PERTINENT STUDIES:  CT 11/3/2017: mobile cecum in LUQ (was in RUQ on 4/2017)  CT 1/8/2016: normal   UGIS 1/15: normal, delayed colon transit, significant stool burden     Labs 1/8 showed Hgb 10.9, MCV 88, RDW 13.9  12/2017: crp 12, esr 13   CMP normal     Documentation Only on 01/03/2018   Component Date Value Ref Range Status     Copath Report 01/03/2018    Final                    Value:Patient Name: LUCINA BAH  MR#: [id number]  Specimen #:   Collected: 1/3/2018  Received: 1/4/2018  Reported: 1/8/2018 10:28  Ordering Phy(s): HELEN IVAN  Additional Phy(s): [facility]: [facility]    For improved result formatting, select 'View Enhanced Report Format' under   Linked Documents section.    SPECIMEN(S):  Esophageal nodule    FINAL DIAGNOSIS:  ESOPHAGEAL NODULE, BIOPSY:  - Fragments of squamous and columnar mucosa consistent with the   squamocolumnar junction. - The squamous mucosa  is unremarkable with no evidence of reflux or other abnormality  - The columnar mucosa is gastric Cardia with mild chronic inflammation  - No evidence of intestinal metaplasia, dysplasia or malignancy    COMMENT:  Multiple tissue levels were cut on this case and no definitive cause for a   nodule was identified  microscopically.    I have personally reviewed all specimens and/or slides, including the   listed special stains, and used them  with my medical ju                          dgement to determine or confirm the final diagnosis.    Electronically signed out by:    Kasi Orosco M.D., [facility]    CLINICAL HISTORY:  22-year-old female with abdominal pain failing to respond to treatment.  GROSS:  The specimen is received in formalin with  "proper patient identification,   labeled \"esophagus nodule\".  The  specimen consists of a 0.2 cm in greatest dimension white soft tissue   fragment.  The specimen is wrapped and  entirely submitted in cassette A1. (Dictated by: [facility]   1/4/2018 10:02 AM)    MICROSCOPIC:  Microscopic examination was performed.    CPT Codes:  A: 35279-KK7    TESTING LAB LOCATION:  [facility], [address]   [address]  [phone]    COLLECTION SITE:  Client: [facility]  Location: [facility] (B)                 Again, thank you for allowing me to participate in the care of your patient.      Sincerely,    Estela Cuenca MD      "

## 2018-01-17 NOTE — MR AVS SNAPSHOT
After Visit Summary   1/17/2018    Samara Oropeza    MRN: [id number]           Patient Information     Date Of Birth          1995        Visit Information        Provider Department      1/17/2018 2:20 PM Estela Cuenca MD [facility]        Today's Diagnoses     Chronic idiopathic constipation    -  1    Abdominal pain, left upper quadrant        Anemia, unspecified type          Care Instructions    Dear Samara,    Thank you for coming in today. As discussed, the plan will be:  ---Blood work today  ---Referral to the pelvic floor center for pelvic floor testing   ---Start Amitiza (instead of Linzess). Start with one pill daily, increase to twice daily if no effect.     Return for follow up visit in 2-3 months.           Follow-ups after your visit        Follow-up notes from your care team     Return in about 10 weeks (around 3/28/2018).      Your next 10 appointments already scheduled     Jan 29, 2018  8:30 AM CST   (Arrive by 8:15 AM)   New Patient Visit with Vitor Coburn MD   [facility] Colon and Rectal Surgery ([facility])    [address] [place] [address] [id number] [phone]            Feb 13, 2018  2:30 PM CST   (Arrive by 2:15 PM)   Return Seizure with Sigifredo Flores MD   [facility] Neurology ([facility])    [address] [place] [id number] [phone]            Feb 27, 2018 10:15 AM CST   [facility]st [facility]    [address]  [address]  [facility] [id number] [phone]            Feb 28, 2018  3:00 PM CST   (Arrive by 2:45 PM)   Return Botox with Frederick Bender MD   [facility] Physical Medicine and Rehabilitation ([facility])    [address] [place] [address] [id number] [phone]             Apr 04, 2018  1:40 PM CDT   (Arrive by 1:25 PM)   Return Visit with Estela Cuenca MD   [facility] ([facility])    [address] [facility]  [address] [facility] [place] [address] [phone]            Apr 17, 2018 11:00 AM CDT   [facility]    [address] [address] [phone]              Future tests that were ordered for you today     Open Future Orders        Priority Expected Expires Ordered    CBC with platelets differential Routine  1/17/2019 1/17/2018    Iron and iron binding capacity Routine  1/17/2019 1/17/2018    Ferritin Routine 1/17/2018 2/16/2018 1/17/2018    Tissue transglutaminase braxton IgA and IgG Routine 1/17/2018 2/16/2018 1/17/2018    IgA Routine  1/17/2019 1/17/2018            Who to contact     Please call your clinic at [phone] to:    Ask questions about your health    Make or cancel appointments    Discuss your medicines    Learn about your test results    Speak to your doctor   If you have compliments or concerns about an experience at your clinic, or if you wish to file a complaint, please contact [facility] Patient Relations at [phone] or email us at [email or web address]         Additional Information About Your Visit        [employer] Information     [employer] gives you secure access to your electronic health record. If you see a primary care provider, you can also send messages to your care team and make appointments. If you have questions, please call your primary care clinic.  If you do not have a primary care provider, please call [phone] and they will assist you.      [employer] is an electronic gateway that provides easy, online access to your medical records. With [employer], you can request a clinic appointment, read your test results, renew a  "prescription or communicate with your care team.     To access your existing account, please contact your [facility] or call [phone] for assistance.        Care EveryWhere ID     This is your Care EveryWhere ID. This could be used by other organizations to access your [facility] medical records  [id number]        Your Vitals Were     Pulse Temperature Height Last Period Pulse Oximetry       79 98.3  F (36.8  C) 1.6 m (5' 3\") 01/07/2018 99%        Blood Pressure from Last 3 Encounters:   01/17/18 103/65   01/16/18 90/54   01/10/18 109/82    Weight from Last 3 Encounters:   01/16/18 68 kg (150 lb)   01/10/18 68 kg (150 lb)   01/08/18 69.3 kg (152 lb 12.5 oz)                 Today's Medication Changes          These changes are accurate as of: 1/17/18  3:43 PM.  If you have any questions, ask your nurse or doctor.               Start taking these medicines.        Dose/Directions    lubiprostone 24 MCG capsule   Commonly known as:  AMITIZA   Used for:  Chronic idiopathic constipation   Started by:  Estela Cuenca MD        Dose:  24 mcg   Take 1 capsule (24 mcg) by mouth 2 times daily (with meals)   Quantity:  30 capsule   Refills:  1            Where to get your medicines      These medications were sent to [place] [address] [place] [address]  [address] [facility] [address]     Phone:  [phone]     lubiprostone 24 MCG capsule                Primary Care Provider Office Phone # Fax #    Sonjanikki Abreu, APRN [facility] [phone]       [address] [facility] [address]  [facility] [address]        Equal Access to Services     [facility] Hospital[facility] : Hadii malcolm Santiago, channing hurtado, qadarrian maealton juan, mike rodriguez . So [facility] [phone].    ATENCIÓN: Si habla español, tiene a livingston disposición servicios gratuitos de asistencia lingüística. Llame al [phone].    We comply with applicable federal civil rights laws " and [place] laws. We do not discriminate on the basis of race, color, national origin, age, disability, sex, sexual orientation, or gender identity.            Thank you!     Thank you for choosing [facility]  for your care. Our goal is always to provide you with excellent care. Hearing back from our patients is one way we can continue to improve our services. Please take a few minutes to complete the written survey that you may receive in the mail after your visit with us. Thank you!             Your Updated Medication List - Protect others around you: Learn how to safely use, store and throw away your medicines at www.disposemymeds.org.          This list is accurate as of: 1/17/18  3:43 PM.  Always use your most recent med list.                   Brand Name Dispense Instructions for use Diagnosis    albuterol 108 (90 BASE) MCG/ACT Inhaler    PROAIR HFA/PROVENTIL HFA/VENTOLIN HFA    1 Inhaler    Inhale 2 puffs into the lungs every 6 hours as needed for shortness of breath / dyspnea or wheezing    Atypical chest pain       amitriptyline 50 MG tablet    ELAVIL    30 tablet    Take 1 tablet (50 mg) by mouth At Bedtime    Abdominal pain, generalized, Insomnia due to medical condition       apremilast 30 MG tablet    OTEZLA     Take 1 tablet (30 mg) by mouth 2 times daily    Behcet's disease (H)       ASPIRIN CHILDRENS 81 MG chewable tablet   Generic drug:  aspirin      Take 81 mg by mouth as needed        aspirin-acetaminophen-caffeine 250-250-65 MG per tablet    EXCEDRIN MIGRAINE     Take 1 tablet by mouth every 6 hours as needed for headaches        benzocaine 20 % Gel    TOPICALE XTRA    30 g    Apply as needed locally to mouth or nasal ulcers for pain; 4 times daily as needed    Behcet's disease (H)       betamethasone valerate 0.1 % cream    VALISONE     Apply topically 2 times daily        * botulinum toxin type A 100 UNITS injection    BOTOX    200 Units    Inject 200 Units into  the muscle every 3 months    Cervical dystonia       * BOTOX IJ      Inject 165 Units into the muscle once Lot /C3 Exp 06/2020        carbamide peroxide 6.5 % otic solution    DEBROX     5 drops 2 times daily        clobetasol 0.05 % cream    TEMOVATE    60 g    Apply topically 2 times daily    Folliculitis       colchicine 0.6 MG tablet     270 tablet    TAKE 2 TABLETS BY MOUTH EVERY MORNING AND 1 TABLET EVERY EVENING.    Behcet's disease (H)       COMPOUNDED NON-CONTROLLED SUBSTANCE - PHARMACY TO MIX COMPOUNDED MEDICATION    CMPD RX    30 capsule    Take one capsule in the morning    Fibromyalgia       diclofenac 1 % Gel topical gel    VOLTAREN    100 g    Apply 2-4 grams to affected area(s) up to 4 times per day as needed. This is an anti-inflammatory medication.    Polyarthralgia       dicyclomine 20 MG tablet    BENTYL    120 tablet    Take 1 tablet (20 mg) by mouth 4 times daily as needed    Abdominal cramping       diltiazem 2% in PLO cream (FV COMPOUNDED) 2% Gel     30 g    Apply small pea size amount three times daily to anus until pain is gone.    Anal fissure       diphenhydrAMINE 25 MG tablet    BENADRYL    30 tablet    Take 1 tablet (25 mg) by mouth every 8 hours as needed for allergies        EPINEPHrine 0.3 MG/0.3ML injection 2-pack    EPIPEN 2-EAMON    0.6 mL    Inject 0.3 mLs (0.3 mg) into the muscle as needed for anaphylaxis    Hx of bee sting allergy       guanFACINE 1 MG tablet    TENEX    270 tablet    Take 3 tablets (3 mg) by mouth 2 times daily    Tic       hydrOXYzine 25 MG tablet    ATARAX    90 tablet    Take 1-2 tablets (25-50 mg) by mouth every 6 hours as needed for anxiety    TAHIR (generalized anxiety disorder)       hyoscyamine 0.125 MG tablet    ANASPAZ/LEVSIN    40 tablet    Take 1-2 tablets (125-250 mcg) by mouth every 4 hours as needed for cramping    Abdominal pain, generalized       hypromellose 0.3 % Soln ophthalmic solution    GENTEAL     1 drop every hour as needed for dry  eyes        LACTAID PO      Take by mouth daily        lactulose 20 GM/30ML Soln     300 mL    Take 30 mLs by mouth 3 times daily as needed    Constipation, unspecified constipation type       lidocaine 2 % topical gel    XYLOCAINE     Apply 1 Tube topically daily Reported on 4/21/2017        lidocaine visc 2% 2.5mL/5mL & maalox/mylanta w/ simeth 2.5mL/5mL & diphenhydrAMINE 5mg/5mL Susp suspension    [facility] Mouthwash [facility]    1 Bottle    Swish and swallow 10 mLs in mouth every 6 hours as needed for mouth sores    Behcet's syndrome (H)       linaclotide 145 MCG capsule    LINZESS    90 capsule    Take 1 capsule (145 mcg) by mouth every morning (before breakfast)    Chronic idiopathic constipation       LORazepam 1 MG tablet    ATIVAN    60 tablet    Take 0.5 tablets (0.5 mg) by mouth 2 times daily as needed for other (atypical chest pain) Do not operate a vehicle after taking this medication    Chronic abdominal pain       lubiprostone 24 MCG capsule    AMITIZA    30 capsule    Take 1 capsule (24 mcg) by mouth 2 times daily (with meals)    Chronic idiopathic constipation       omeprazole 40 MG capsule    priLOSEC    90 capsule    Take 1 capsule (40 mg) by mouth daily    Gastroesophageal reflux disease, esophagitis presence not specified       ondansetron 8 MG ODT tab    ZOFRAN-ODT    60 tablet    Take 1 tablet (8 mg) by mouth every 8 hours as needed for nausea    Non-intractable vomiting with nausea, unspecified vomiting type, Hematemesis, presence of nausea not specified       prazosin 1 MG capsule    MINIPRESS    30 capsule    Take 1 capsule (1 mg) by mouth At Bedtime For nightmares.    PTSD (post-traumatic stress disorder)       SPRINTEC 28 0.25-35 MG-MCG per tablet   Generic drug:  norgestimate-ethinyl estradiol     84 tablet    TAKE 1 TABLET BY MOUTH ONCE DAILY.    General counseling for prescription of oral contraceptives       sucralfate 1 GM/10ML suspension    CARAFATE    1200 mL    Take 10 mLs (1 g) by  mouth 4 times daily    Bile reflux gastritis, Nausea       triamcinolone 0.1 % cream    KENALOG    15 g    Apply sparingly to oral ulcers three times daily for 14 days as needed.    Behcet's syndrome (H)       * Notice:  This list has 2 medication(s) that are the same as other medications prescribed for you. Read the directions carefully, and ask your doctor or other care provider to review them with you.

## 2018-01-18 LAB — IGA SERPL-MCNC: 97 MG/DL (ref 70–380)

## 2018-01-20 LAB
TTG IGA SER-ACNC: <1 U/ML
TTG IGG SER-ACNC: <1 U/ML

## 2018-01-23 NOTE — PROGRESS NOTES
Colon and Rectal Surgery Clinic Note    RE: Samara Oropeza.  : 1995.  MI: 2018.    Reason for visit: Mobile cecum, possible cecal bascule.    HPI: 21 y/o F with chronic constipation since childhood who presents with intermittent abdominal pain and distension associated with constipation or obstipation. Additional symptoms include occasional hematochezia, persistent nausea, and non-bloody vomiting 2-3 times per week. Currently having 1 hard/mushy BM q7-10 days. Denies fecal seepage, urgency, or incontinence. No urinary symptoms. PMH significant for Behcet's syndrome, PTSD, fibromyalgia, IBS, migraines, Tourette syndrome, and chronic abdominal pain. Denies FH of CRC, IBD, or polyps. Recent EGD/colonoscopy showed: 4 mm stomach nodule that was removed, otherwise normal; and normal TI and colon.    Also underwent SBFT that showed:  Impression:   1. Unremarkable upper GI and small bowel through. No stricture.  2. Significant colonic stool burden.  3. Delayed transit time to the colon.    Recent CT c/a/p from ER visit showed:  IMPRESSION:   1. No acute finding in the chest, abdomen, or pelvis on this  noncontrast examination.  Negative for upper or lower gastrointestinal  tract perforation as questioned.  2. Hepatic steatosis.  3. Left adnexal cyst, favor a dominant follicle..    Medical history:  Past Medical History:   Diagnosis Date     Anxiety      Arthritis      Behcet's disease (H)      Cervical adenitis May 2010     Chronic abdominal pain      Constipation, chronic      Gastro-oesophageal reflux disease      Gastroparesis      Migraines      Neuromuscular disorder (H)     fibramyalgia     Palpitations      Seizure (H)      Seizures (H)     unknown etiology     Syncope      Tourette's        Surgical history:  Past Surgical History:   Procedure Laterality Date     ARTHROSCOPY KNEE WITH PATELLAR REALIGNMENT  2013    Procedure: ARTHROSCOPY KNEE WITH PATELLAR REALIGNMENT;  Left Knee  Arthroscopy, Medial Patellofemoral Ligament Reconstruction with Allograft  ;  Surgeon: Jennifer Acevedo MD;  Location: [facility]     COLONOSCOPY  2015     DENTAL SURGERY  1996    Teeth removal     ENDOSCOPY UPPER, COLONOSCOPY, COMBINED  2005     HC ESOPH/GAS REFLUX TEST W NASAL IMPED >1 HR N/A 2/15/2017    Procedure: ESOPHAGEAL IMPEDENCE FUNCTION TEST WITH 24 HOUR PH GREATER THAN 1 HOUR;  Surgeon: Timothy Matta MD;  Location: [facility]       Family history:  Family History   Problem Relation Age of Onset     Depression Mother      Neurologic Disorder Mother      Migraines, take imitrex injection.  Also in maternal grandmother.       Alcohol/Drug Father      Hypertension Father      Depression Father      Cardiovascular Maternal Grandmother      Depression Maternal Grandmother      Hypertension Maternal Grandmother      Alzheimer Disease Maternal Grandmother      Cardiovascular Maternal Grandfather      Hypertension Maternal Grandfather      Depression Maternal Grandfather      Alcohol/Drug Maternal Grandfather      Cardiovascular Paternal Grandmother      Hypertension Paternal Grandmother      Cardiovascular Paternal Grandfather      Hypertension Paternal Grandfather      Glaucoma No family hx of      Macular Degeneration No family hx of        Medications:  Current Outpatient Prescriptions   Medication Sig Dispense Refill     lubiprostone (AMITIZA) 24 MCG capsule Take 1 capsule (24 mcg) by mouth 2 times daily (with meals) 30 capsule 1     benzocaine (TOPICALE XTRA) 20 % GEL Apply as needed locally to mouth or nasal ulcers for pain; 4 times daily as needed 30 g 1     colchicine 0.6 MG tablet TAKE 2 TABLETS BY MOUTH EVERY MORNING AND 1 TABLET EVERY EVENING. 270 tablet 0     apremilast (OTEZLA) 30 MG tablet Take 1 tablet (30 mg) by mouth 2 times daily       linaclotide (LINZESS) 145 MCG capsule Take 1 capsule (145 mcg) by mouth every morning (before breakfast) 90 capsule 1     dicyclomine (BENTYL) 20 MG tablet  Take 1 tablet (20 mg) by mouth 4 times daily as needed 120 tablet 3     aspirin-acetaminophen-caffeine (EXCEDRIN MIGRAINE) 250-250-65 MG per tablet Take 1 tablet by mouth every 6 hours as needed for headaches       SPRINTEC 28 0.25-35 MG-MCG per tablet TAKE 1 TABLET BY MOUTH ONCE DAILY. 84 tablet 2     hydrOXYzine (ATARAX) 25 MG tablet Take 1-2 tablets (25-50 mg) by mouth every 6 hours as needed for anxiety 90 tablet 1     prazosin (MINIPRESS) 1 MG capsule Take 1 capsule (1 mg) by mouth At Bedtime For nightmares. 30 capsule 1     OnabotulinumtoxinA (BOTOX IJ) Inject 165 Units into the muscle once Lot /C3  Exp 06/2020       guanFACINE (TENEX) 1 MG tablet Take 3 tablets (3 mg) by mouth 2 times daily 270 tablet 3     diltiazem 2% in PLO cream, FV COMPOUNDED, 2% GEL Apply small pea size amount three times daily to anus until pain is gone. 30 g 1     COMPOUNDED NON-CONTROLLED SUBSTANCE (CMPD RX) - PHARMACY TO MIX COMPOUNDED MEDICATION Take one capsule in the morning 30 capsule 0     amitriptyline (ELAVIL) 50 MG tablet Take 1 tablet (50 mg) by mouth At Bedtime 30 tablet 11     lactulose 20 GM/30ML SOLN Take 30 mLs by mouth 3 times daily as needed 300 mL 3     LORazepam (ATIVAN) 1 MG tablet Take 0.5 tablets (0.5 mg) by mouth 2 times daily as needed for other (atypical chest pain) Do not operate a vehicle after taking this medication 60 tablet 0     ondansetron (ZOFRAN-ODT) 8 MG ODT tab Take 1 tablet (8 mg) by mouth every 8 hours as needed for nausea 60 tablet 6     diphenhydrAMINE (BENADRYL) 25 MG tablet Take 1 tablet (25 mg) by mouth every 8 hours as needed for allergies 30 tablet 0     sucralfate (CARAFATE) 1 GM/10ML suspension Take 10 mLs (1 g) by mouth 4 times daily 1200 mL 2     diclofenac (VOLTAREN) 1 % GEL topical gel Apply 2-4 grams to affected area(s) up to 4 times per day as needed. This is an anti-inflammatory medication. 100 g 4     aspirin (ASPIRIN CHILDRENS) 81 MG chewable tablet Take 81 mg by mouth as  needed        omeprazole (PRILOSEC) 40 MG capsule Take 1 capsule (40 mg) by mouth daily 90 capsule 3     EPINEPHrine (EPIPEN 2-EAMON) 0.3 MG/0.3ML injection Inject 0.3 mLs (0.3 mg) into the muscle as needed for anaphylaxis 0.6 mL 3     albuterol (PROAIR HFA/PROVENTIL HFA/VENTOLIN HFA) 108 (90 BASE) MCG/ACT Inhaler Inhale 2 puffs into the lungs every 6 hours as needed for shortness of breath / dyspnea or wheezing 1 Inhaler 1     Magic Mouthwash (FV std formula) lidocaine visc 2% 2.5mL/5mL & maalox/mylanta w/ simeth 2.5mL/5mL & diphenhydrAMINE 5mg/5mL Swish and swallow 10 mLs in mouth every 6 hours as needed for mouth sores 1 Bottle 1     clobetasol (TEMOVATE) 0.05 % cream Apply topically 2 times daily 60 g 0     botulinum toxin type A (BOTOX) 100 UNITS injection Inject 200 Units into the muscle every 3 months 200 Units 3     hyoscyamine (ANASPAZ,LEVSIN) 0.125 MG tablet Take 1-2 tablets (125-250 mcg) by mouth every 4 hours as needed for cramping 40 tablet 1     hypromellose (GENTEAL) 0.3 % SOLN 1 drop every hour as needed for dry eyes        betamethasone valerate (VALISONE) 0.1 % cream Apply topically 2 times daily       carbamide peroxide (DEBROX) 6.5 % otic solution 5 drops 2 times daily       Lactase (LACTAID PO) Take by mouth daily       lidocaine (XYLOCAINE) 2 % jelly Apply 1 Tube topically daily Reported on 4/21/2017       triamcinolone (KENALOG) 0.1 % cream Apply sparingly to oral ulcers three times daily for 14 days as needed. 15 g 1       Allergies:  Allergies   Allergen Reactions     Amoxil [Penicillins] Rash     Dad unsure of reaction.     Bee Venom Anaphylaxis     Contrast Dye Rash     Contrast Media Ready-Box [facility], 04/09/2014.; Contrast Media Ready-Box [facility], 04/09/2014.  NOTE: this is a contrast media oral with iodine. Premedicate with methylpred standard for IV contrast, request barium contrast for oral contrast.     Kiwi Swelling     Orange Fruit [Citrus] Anaphylaxis     Pineapple Anaphylaxis, Difficulty  "breathing and Rash     Reglan [Metoclopramide] Other (See Comments)     IV dose only, in ER, rapid heart rate.     Ace Inhibitors      Difficulty in breathing and GI upset     Amitiza [Lubiprostone] Nausea and Vomiting     Amoxicillin-Pot Clavulanate      Latex      Midazolam Unknown     Other reaction(s): Unknown  parent states that when pt takes this medication, she wakes up being very violent .  parent states that when pt takes this medication, she wakes up being very violent .     No Clinical Screening - See Comments      Bleech/ chest tightness, itchy throat, swollen tongue, hives     Versed      Coming out of pelvic exam at age of 5, was kicking and screaming when coming out of the versed.     Adhesive Tape Rash     Azithromycin Hives and Rash     Cephalexin Itching and Rash     Itchy mouth  Itchy mouth     Keflex [Cephalexin-Fd&C Yellow #6] Hives       Social history:   Social History   Substance Use Topics     Smoking status: Never Smoker     Smokeless tobacco: Never Used     Alcohol use 0.6 oz/week     1 Standard drinks or equivalent per week      Comment: rarely     Marital status: single.    Physical Examination:  /85 (BP Location: Left arm, Patient Position: Chair, Cuff Size: Adult Regular)  Pulse 131  Temp 97.9  F (36.6  C) (Oral)  Ht 5' 2.25\"  Wt 150 lb 14.4 oz  LMP 01/07/2018  SpO2 100%  BMI 27.38 kg/m2  General: Well hydrated. No acute distress.  Abdomen: Soft, NT. No masses or inguinal adenopathy palpated.    Investigations:  None.    Procedures:  None.    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 No evidence of cecal bascule or volvulus, or intestinal malrotation.     PLAN  1. Refer to GI for ongoing management of constipation. Would recommend full constipation work-up at the [facility]. I also welcomed a 2nd opinion by a colorectal surgeon at the pelvic floor center here in [place] or [facility].    Time spent: 60 minutes.  >50% spent in discussing, " counseling and coordinating care.    Vitor Coburn M.D., M.Sc.     Division of Colon and Rectal Surgery  [facility]    Referring Provider:  Tj Mojica MD  [address] [place] [id number]      Primary Care Provider:  Sonja Abreu    CC:  Kacie Almeida NP.  Estela Cuenca MD.  Arian Rollins MD.

## 2018-01-23 NOTE — TELEPHONE ENCOUNTER
APPT INFO    Date /Time: 1/29/18   Reason for Appt: Mobile Cecum/Rectal Bleeding   Ref Provider/Clinic: Dr Mojica, General Surgery   Are there internal records? Yes/No?  IF YES, list clinic names: Yes  See Above  [facility]  [place]  G[facility]   Are there outside records? Yes/No? No   Patient Contact (Y/N) & Call Details: No referred   Action: Reviewed records; Records are in EPIC     OUTSIDE RECORDS CHECKLIST     CLINIC NAME COMMENTS REC (x) IMG (x)

## 2018-01-29 ENCOUNTER — PRE VISIT (OUTPATIENT)
Dept: SURGERY | Facility: CLINIC | Age: 23
End: 2018-01-29

## 2018-01-29 ENCOUNTER — OFFICE VISIT (OUTPATIENT)
Dept: SURGERY | Facility: CLINIC | Age: 23
End: 2018-01-29
Payer: COMMERCIAL

## 2018-01-29 ENCOUNTER — DOCUMENTATION ONLY (OUTPATIENT)
Dept: SURGERY | Facility: CLINIC | Age: 23
End: 2018-01-29

## 2018-01-29 VITALS
HEART RATE: 131 BPM | SYSTOLIC BLOOD PRESSURE: 125 MMHG | OXYGEN SATURATION: 100 % | TEMPERATURE: 97.9 F | WEIGHT: 150.9 LBS | HEIGHT: 62 IN | BODY MASS INDEX: 27.77 KG/M2 | DIASTOLIC BLOOD PRESSURE: 85 MMHG

## 2018-01-29 DIAGNOSIS — K59.04 CHRONIC IDIOPATHIC CONSTIPATION: Primary | ICD-10-CM

## 2018-01-29 ASSESSMENT — PAIN SCALES - GENERAL: PAINLEVEL: SEVERE PAIN (7)

## 2018-01-29 NOTE — NURSING NOTE
"Chief Complaint   Patient presents with     Clinic Care Coordination - Initial     New pt consult, mobile cecum, rectal bleeding.        Vitals:    01/29/18 0832   BP: 125/85   BP Location: Left arm   Patient Position: Chair   Cuff Size: Adult Regular   Pulse: 131   Temp: 97.9  F (36.6  C)   TempSrc: Oral   SpO2: 100%   Weight: 150 lb 14.4 oz   Height: 5' 2.25\"       Body mass index is 27.38 kg/(m^2).  Isaac BUCKLEY LPN                        "

## 2018-01-29 NOTE — MR AVS SNAPSHOT
After Visit Summary   1/29/2018    Samara Oropeza    MRN: [id number]           Patient Information     Date Of Birth          1995        Visit Information        Provider Department      1/29/2018 8:30 AM Vtior Coburn MD [facility] Colon and Rectal Surgery        Today's Diagnoses     Chronic idiopathic constipation    -  1       Follow-ups after your visit        Your next 10 appointments already scheduled     Feb 13, 2018  2:30 PM CST   (Arrive by 2:15 PM)   Return Seizure with Sigifredo Flores MD   [facility] Neurology ([facility])    [address] [place] [address]  3rd [facility] [address]   [phone]            Feb 27, 2018 10:15 AM CST   Return Visit with Cata Hurst NP   [facility]    [address]  [facility] [id number] [phone]            Feb 28, 2018  3:00 PM CST   (Arrive by 2:45 PM)   Return Botox with Frederick Bender MD   [facility] Physical Medicine and Rehabilitation ([facility])    [address] [place] [id number] [phone]            Apr 04, 2018  1:40 PM CDT   (Arrive by 1:25 PM)   Return Visit with Estela Cuenca MD   [facility] Gastroenterology [facility] IBD [facility] ([facility])    [address] [place] [address]  4th [facility] [id number] [phone]            Apr 17, 2018 11:00 AM CDT   Return Visit with Austen Marquez MD   [facility] ([facility])    [address] [id number] [phone]              Who to contact     Please call your clinic at [phone] to:    Ask questions about your health    Make or cancel appointments    Discuss your medicines    Learn about your test results    Speak to your doctor   If you have compliments or concerns about an experience at your clinic, or  "if you wish to file a complaint, please contact [facility] Patient Relations at [phone] or email us at [email or web address]         Additional Information About Your Visit        [employer] Information     [employer] gives you secure access to your electronic health record. If you see a primary care provider, you can also send messages to your care team and make appointments. If you have questions, please call your primary care clinic.  If you do not have a primary care provider, please call [phone] and they will assist you.      [employer] is an electronic gateway that provides easy, online access to your medical records. With [employer], you can request a clinic appointment, read your test results, renew a prescription or communicate with your care team.     To access your existing account, please contact your [facility] or call [phone] for assistance.        Care EveryWhere ID     This is your Care EveryWhere ID. This could be used by other organizations to access your [place] medical records  [id number]        Your Vitals Were     Pulse Temperature Height Last Period Pulse Oximetry BMI (Body Mass Index)    131 97.9  F (36.6  C) (Oral) 5' 2.25\" 01/07/2018 100% 27.38 kg/m2       Blood Pressure from Last 3 Encounters:   01/29/18 125/85   01/17/18 103/65   01/16/18 90/54    Weight from Last 3 Encounters:   01/29/18 150 lb 14.4 oz   01/16/18 150 lb   01/10/18 150 lb              Today, you had the following     No orders found for display       Primary Care Provider Office Phone # Fax #    EVI Cardona [facility] [phone]       [address]  [facility] [address]        Equal Access to Services     NABIL GALEANO : Brandon Santiago, channing hurtado, mike maloney. So [facility] [phone].    ATENCIÓN: Si habla español, tiene a livingston disposición servicios " jose de asistencia lingüística. Jesus bullard [phone].    We comply with applicable federal civil rights laws and [place] laws. We do not discriminate on the basis of race, color, national origin, age, disability, sex, sexual orientation, or gender identity.            Thank you!     Thank you for choosing [facility] COLON AND RECTAL SURGERY  for your care. Our goal is always to provide you with excellent care. Hearing back from our patients is one way we can continue to improve our services. Please take a few minutes to complete the written survey that you may receive in the mail after your visit with us. Thank you!             Your Updated Medication List - Protect others around you: Learn how to safely use, store and throw away your medicines at www.disposemymeds.org.          This list is accurate as of 1/29/18  9:25 AM.  Always use your most recent med list.                   Brand Name Dispense Instructions for use Diagnosis    albuterol 108 (90 BASE) MCG/ACT Inhaler    PROAIR HFA/PROVENTIL HFA/VENTOLIN HFA    1 Inhaler    Inhale 2 puffs into the lungs every 6 hours as needed for shortness of breath / dyspnea or wheezing    Atypical chest pain       amitriptyline 50 MG tablet    ELAVIL    30 tablet    Take 1 tablet (50 mg) by mouth At Bedtime    Abdominal pain, generalized, Insomnia due to medical condition       apremilast 30 MG tablet    OTEZLA     Take 1 tablet (30 mg) by mouth 2 times daily    Behcet's disease (H)       ASPIRIN CHILDRENS 81 MG chewable tablet   Generic drug:  aspirin      Take 81 mg by mouth as needed        aspirin-acetaminophen-caffeine 250-250-65 MG per tablet    EXCEDRIN MIGRAINE     Take 1 tablet by mouth every 6 hours as needed for headaches        benzocaine 20 % Gel    TOPICALE XTRA    30 g    Apply as needed locally to mouth or nasal ulcers for pain; 4 times daily as needed    Behcet's disease (H)       betamethasone valerate 0.1 % cream    VALISONE     Apply topically 2  times daily        * botulinum toxin type A 100 UNITS injection    BOTOX    200 Units    Inject 200 Units into the muscle every 3 months    Cervical dystonia       * BOTOX IJ      Inject 165 Units into the muscle once Lot /C3 Exp 06/2020        carbamide peroxide 6.5 % otic solution    DEBROX     5 drops 2 times daily        clobetasol 0.05 % cream    TEMOVATE    60 g    Apply topically 2 times daily    Folliculitis       colchicine 0.6 MG tablet     270 tablet    TAKE 2 TABLETS BY MOUTH EVERY MORNING AND 1 TABLET EVERY EVENING.    Behcet's disease (H)       COMPOUNDED NON-CONTROLLED SUBSTANCE - PHARMACY TO MIX COMPOUNDED MEDICATION    CMPD RX    30 capsule    Take one capsule in the morning    Fibromyalgia       diclofenac 1 % Gel topical gel    VOLTAREN    100 g    Apply 2-4 grams to affected area(s) up to 4 times per day as needed. This is an anti-inflammatory medication.    Polyarthralgia       dicyclomine 20 MG tablet    BENTYL    120 tablet    Take 1 tablet (20 mg) by mouth 4 times daily as needed    Abdominal cramping       diltiazem 2% in PLO cream (FV COMPOUNDED) 2% Gel     30 g    Apply small pea size amount three times daily to anus until pain is gone.    Anal fissure       diphenhydrAMINE 25 MG tablet    BENADRYL    30 tablet    Take 1 tablet (25 mg) by mouth every 8 hours as needed for allergies        EPINEPHrine 0.3 MG/0.3ML injection 2-pack    EPIPEN 2-EAMON    0.6 mL    Inject 0.3 mLs (0.3 mg) into the muscle as needed for anaphylaxis    Hx of bee sting allergy       guanFACINE 1 MG tablet    TENEX    270 tablet    Take 3 tablets (3 mg) by mouth 2 times daily    Tic       hydrOXYzine 25 MG tablet    ATARAX    90 tablet    Take 1-2 tablets (25-50 mg) by mouth every 6 hours as needed for anxiety    TAHIR (generalized anxiety disorder)       hyoscyamine 0.125 MG tablet    ANASPAZ/LEVSIN    40 tablet    Take 1-2 tablets (125-250 mcg) by mouth every 4 hours as needed for cramping    Abdominal pain,  generalized       hypromellose 0.3 % Soln ophthalmic solution    GENTEAL     1 drop every hour as needed for dry eyes        LACTAID PO      Take by mouth daily        lactulose 20 GM/30ML Soln     300 mL    Take 30 mLs by mouth 3 times daily as needed    Constipation, unspecified constipation type       lidocaine 2 % topical gel    XYLOCAINE     Apply 1 Tube topically daily Reported on 4/21/2017        lidocaine visc 2% 2.5mL/5mL & maalox/mylanta w/ simeth 2.5mL/5mL & diphenhydrAMINE 5mg/5mL Susp suspension    [facility] Mouthwash \[facility]\""    1 Bottle    Swish and swallow 10 mLs in mouth every 6 hours as needed for mouth sores    Behcet's syndrome (H)       linaclotide 145 MCG capsule    LINZESS    90 capsule    Take 1 capsule (145 mcg) by mouth every morning (before breakfast)    Chronic idiopathic constipation       LORazepam 1 MG tablet    ATIVAN    60 tablet    Take 0.5 tablets (0.5 mg) by mouth 2 times daily as needed for other (atypical chest pain) Do not operate a vehicle after taking this medication    Chronic abdominal pain       lubiprostone 24 MCG capsule    AMITIZA    30 capsule    Take 1 capsule (24 mcg) by mouth 2 times daily (with meals)    Chronic idiopathic constipation       omeprazole 40 MG capsule    priLOSEC    90 capsule    Take 1 capsule (40 mg) by mouth daily    Gastroesophageal reflux disease, esophagitis presence not specified       ondansetron 8 MG ODT tab    ZOFRAN-ODT    60 tablet    Take 1 tablet (8 mg) by mouth every 8 hours as needed for nausea    Non-intractable vomiting with nausea, unspecified vomiting type, Hematemesis, presence of nausea not specified       prazosin 1 MG capsule    MINIPRESS    30 capsule    Take 1 capsule (1 mg) by mouth At Bedtime For nightmares.    PTSD (post-traumatic stress disorder)       SPRINTEC 28 0.25-35 MG-MCG per tablet   Generic drug:  norgestimate-ethinyl estradiol     84 tablet    TAKE 1 TABLET BY MOUTH ONCE DAILY.    General counseling for  prescription of oral contraceptives       sucralfate 1 GM/10ML suspension    CARAFATE    1200 mL    Take 10 mLs (1 g) by mouth 4 times daily    Bile reflux gastritis, Nausea       triamcinolone 0.1 % cream    KENALOG    15 g    Apply sparingly to oral ulcers three times daily for 14 days as needed.    Behcet's syndrome (H)       * Notice:  This list has 2 medication(s) that are the same as other medications prescribed for you. Read the directions carefully, and ask your doctor or other care provider to review them with you.

## 2018-01-29 NOTE — LETTER
2018       RE: Samara Oropeza  [address]   [place] [address]     Dear Colleague,    Thank you for referring your patient, Samara Oropeza, to the [facility] COLON AND RECTAL SURGERY at [facility]. Please see a copy of my visit note below.    Colon and Rectal Surgery Clinic Note    RE: Samara Oropeza.  : 1995.  MI: 2018.    Reason for visit: Mobile cecum, possible cecal bascule.    HPI: 21 y/o F with chronic constipation since childhood who presents with intermittent abdominal pain and distension associated with constipation or obstipation. Additional symptoms include occasional hematochezia, persistent nausea, and non-bloody vomiting 2-3 times per week. Currently having 1 hard/mushy BM q7-10 days. Denies fecal seepage, urgency, or incontinence. No urinary symptoms. PMH significant for Behcet's syndrome, PTSD, fibromyalgia, IBS, migraines, Tourette syndrome, and chronic abdominal pain. Denies FH of CRC, IBD, or polyps. Recent EGD/colonoscopy showed: 4 mm stomach nodule that was removed, otherwise normal; and normal TI and colon.    Also underwent SBFT that showed:  Impression:   1. Unremarkable upper GI and small bowel through. No stricture.  2. Significant colonic stool burden.  3. Delayed transit time to the colon.    Recent CT c/a/p from ER visit showed:  IMPRESSION:   1. No acute finding in the chest, abdomen, or pelvis on this  noncontrast examination.  Negative for upper or lower gastrointestinal  tract perforation as questioned.  2. Hepatic steatosis.  3. Left adnexal cyst, favor a dominant follicle..    Medical history:  Past Medical History:   Diagnosis Date     Anxiety      Arthritis      Behcet's disease (H)      Cervical adenitis May 2010     Chronic abdominal pain      Constipation, chronic      Gastro-oesophageal reflux disease      Gastroparesis      Migraines      Neuromuscular disorder (H)     fibramyalgia      Palpitations      Seizure (H)      Seizures (H)     unknown etiology     Syncope      Tourette's        Surgical history:  Past Surgical History:   Procedure Laterality Date     ARTHROSCOPY KNEE WITH PATELLAR REALIGNMENT  7/25/2013    Procedure: ARTHROSCOPY KNEE WITH PATELLAR REALIGNMENT;  Left Knee Arthroscopy, Medial Patellofemoral Ligament Reconstruction with Allograft  ;  Surgeon: Jennifer Acevedo MD;  Location: [facility]     COLONOSCOPY  2015     DENTAL SURGERY  1996    Teeth removal     ENDOSCOPY UPPER, COLONOSCOPY, COMBINED  2005     HC ESOPH/GAS REFLUX TEST W NASAL IMPED >1 HR N/A 2/15/2017    Procedure: ESOPHAGEAL IMPEDENCE FUNCTION TEST WITH 24 HOUR PH GREATER THAN 1 HOUR;  Surgeon: Timothy Matta MD;  Location:  GI       Family history:  Family History   Problem Relation Age of Onset     Depression Mother      Neurologic Disorder Mother      Migraines, take imitrex injection.  Also in maternal grandmother.       Alcohol/Drug Father      Hypertension Father      Depression Father      Cardiovascular Maternal Grandmother      Depression Maternal Grandmother      Hypertension Maternal Grandmother      Alzheimer Disease Maternal Grandmother      Cardiovascular Maternal Grandfather      Hypertension Maternal Grandfather      Depression Maternal Grandfather      Alcohol/Drug Maternal Grandfather      Cardiovascular Paternal Grandmother      Hypertension Paternal Grandmother      Cardiovascular Paternal Grandfather      Hypertension Paternal Grandfather      Glaucoma No family hx of      Macular Degeneration No family hx of        Medications:  Current Outpatient Prescriptions   Medication Sig Dispense Refill     lubiprostone (AMITIZA) 24 MCG capsule Take 1 capsule (24 mcg) by mouth 2 times daily (with meals) 30 capsule 1     benzocaine (TOPICALE XTRA) 20 % GEL Apply as needed locally to mouth or nasal ulcers for pain; 4 times daily as needed 30 g 1     colchicine 0.6 MG tablet TAKE 2 TABLETS BY MOUTH  EVERY MORNING AND 1 TABLET EVERY EVENING. 270 tablet 0     apremilast (OTEZLA) 30 MG tablet Take 1 tablet (30 mg) by mouth 2 times daily       linaclotide (LINZESS) 145 MCG capsule Take 1 capsule (145 mcg) by mouth every morning (before breakfast) 90 capsule 1     dicyclomine (BENTYL) 20 MG tablet Take 1 tablet (20 mg) by mouth 4 times daily as needed 120 tablet 3     aspirin-acetaminophen-caffeine (EXCEDRIN MIGRAINE) 250-250-65 MG per tablet Take 1 tablet by mouth every 6 hours as needed for headaches       SPRINTEC 28 0.25-35 MG-MCG per tablet TAKE 1 TABLET BY MOUTH ONCE DAILY. 84 tablet 2     hydrOXYzine (ATARAX) 25 MG tablet Take 1-2 tablets (25-50 mg) by mouth every 6 hours as needed for anxiety 90 tablet 1     prazosin (MINIPRESS) 1 MG capsule Take 1 capsule (1 mg) by mouth At Bedtime For nightmares. 30 capsule 1     OnabotulinumtoxinA (BOTOX IJ) Inject 165 Units into the muscle once Lot /C3  Exp 06/2020       guanFACINE (TENEX) 1 MG tablet Take 3 tablets (3 mg) by mouth 2 times daily 270 tablet 3     diltiazem 2% in PLO cream, FV COMPOUNDED, 2% GEL Apply small pea size amount three times daily to anus until pain is gone. 30 g 1     COMPOUNDED NON-CONTROLLED SUBSTANCE (CMPD RX) - PHARMACY TO MIX COMPOUNDED MEDICATION Take one capsule in the morning 30 capsule 0     amitriptyline (ELAVIL) 50 MG tablet Take 1 tablet (50 mg) by mouth At Bedtime 30 tablet 11     lactulose 20 GM/30ML SOLN Take 30 mLs by mouth 3 times daily as needed 300 mL 3     LORazepam (ATIVAN) 1 MG tablet Take 0.5 tablets (0.5 mg) by mouth 2 times daily as needed for other (atypical chest pain) Do not operate a vehicle after taking this medication 60 tablet 0     ondansetron (ZOFRAN-ODT) 8 MG ODT tab Take 1 tablet (8 mg) by mouth every 8 hours as needed for nausea 60 tablet 6     diphenhydrAMINE (BENADRYL) 25 MG tablet Take 1 tablet (25 mg) by mouth every 8 hours as needed for allergies 30 tablet 0     sucralfate (CARAFATE) 1 GM/10ML  suspension Take 10 mLs (1 g) by mouth 4 times daily 1200 mL 2     diclofenac (VOLTAREN) 1 % GEL topical gel Apply 2-4 grams to affected area(s) up to 4 times per day as needed. This is an anti-inflammatory medication. 100 g 4     aspirin (ASPIRIN CHILDRENS) 81 MG chewable tablet Take 81 mg by mouth as needed        omeprazole (PRILOSEC) 40 MG capsule Take 1 capsule (40 mg) by mouth daily 90 capsule 3     EPINEPHrine (EPIPEN 2-EAMON) 0.3 MG/0.3ML injection Inject 0.3 mLs (0.3 mg) into the muscle as needed for anaphylaxis 0.6 mL 3     albuterol (PROAIR HFA/PROVENTIL HFA/VENTOLIN HFA) 108 (90 BASE) MCG/ACT Inhaler Inhale 2 puffs into the lungs every 6 hours as needed for shortness of breath / dyspnea or wheezing 1 Inhaler 1     Magic Mouthwash (FV std formula) lidocaine visc 2% 2.5mL/5mL & maalox/mylanta w/ simeth 2.5mL/5mL & diphenhydrAMINE 5mg/5mL Swish and swallow 10 mLs in mouth every 6 hours as needed for mouth sores 1 Bottle 1     clobetasol (TEMOVATE) 0.05 % cream Apply topically 2 times daily 60 g 0     botulinum toxin type A (BOTOX) 100 UNITS injection Inject 200 Units into the muscle every 3 months 200 Units 3     hyoscyamine (ANASPAZ,LEVSIN) 0.125 MG tablet Take 1-2 tablets (125-250 mcg) by mouth every 4 hours as needed for cramping 40 tablet 1     hypromellose (GENTEAL) 0.3 % SOLN 1 drop every hour as needed for dry eyes        betamethasone valerate (VALISONE) 0.1 % cream Apply topically 2 times daily       carbamide peroxide (DEBROX) 6.5 % otic solution 5 drops 2 times daily       Lactase (LACTAID PO) Take by mouth daily       lidocaine (XYLOCAINE) 2 % jelly Apply 1 Tube topically daily Reported on 4/21/2017       triamcinolone (KENALOG) 0.1 % cream Apply sparingly to oral ulcers three times daily for 14 days as needed. 15 g 1       Allergies:  Allergies   Allergen Reactions     Amoxil [Penicillins] Rash     Dad unsure of reaction.     Bee Venom Anaphylaxis     Contrast Dye Rash     Contrast Media Ready-Box  "MISC, 04/09/2014.; Contrast Media Ready-Box [facility] [place].  NOTE: this is a contrast media oral with iodine. Premedicate with methylpred standard for IV contrast, request barium contrast for oral contrast.     Kiwi Swelling     Orange Fruit [Citrus] Anaphylaxis     Pineapple Anaphylaxis, Difficulty breathing and Rash     Reglan [Metoclopramide] Other (See Comments)     IV dose only, in ER, rapid heart rate.     Ace Inhibitors      Difficulty in breathing and GI upset     Amitiza [Lubiprostone] Nausea and Vomiting     Amoxicillin-Pot Clavulanate      Latex      Midazolam Unknown     Other reaction(s): Unknown  parent states that when pt takes this medication, she wakes up being very violent .  parent states that when pt takes this medication, she wakes up being very violent .     No Clinical Screening - See Comments      Bleech/ chest tightness, itchy throat, swollen tongue, hives     Versed      Coming out of pelvic exam at age of 5, was kicking and screaming when coming out of the versed.     Adhesive Tape Rash     Azithromycin Hives and Rash     Cephalexin Itching and Rash     Itchy mouth  Itchy mouth     Keflex [Cephalexin-Fd&C Yellow #6] Hives       Social history:   Social History   Substance Use Topics     Smoking status: Never Smoker     Smokeless tobacco: Never Used     Alcohol use 0.6 oz/week     1 Standard drinks or equivalent per week      Comment: rarely     Marital status: single.    Physical Examination:  /85 (BP Location: Left arm, Patient Position: Chair, Cuff Size: Adult Regular)  Pulse 131  Temp 97.9  F (36.6  C) (Oral)  Ht 5' 2.25\"  Wt 150 lb 14.4 oz  LMP 01/07/2018  SpO2 100%  BMI 27.38 kg/m2  General: Well hydrated. No acute distress.  Abdomen: Soft, NT. No masses or inguinal adenopathy palpated.    Investigations:  None.    Procedures:  None.    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 No evidence of cecal bascule or volvulus, or intestinal " malrotation.     PLAN  1. Refer to GI for ongoing management of constipation. Would recommend full constipation work-up at the [facility]. I also welcomed a 2nd opinion by a colorectal surgeon at the pelvic floor center here in [place] or [facility].    Time spent: 60 minutes.  >50% spent in discussing, counseling and coordinating care.    Vitor Coburn M.D., M.Sc.     Division of Colon and Rectal Surgery  [facility]    Referring Provider:  Tj Mojica MD  [address] [place] [id number]      Primary Care Provider:  Sonja Abreu      Again, thank you for allowing me to participate in the care of your patient.      Sincerely,    Vitor Coburn MD    CC:  Kacie Almeida NP.  Estela Cuenca MD.  Arian Rollins MD.

## 2018-01-31 NOTE — PROCEDURES
"Procedure Date: 12/21/2017      VIDEO EEG#:  -3          VIDEO EEG DATE:  12/21/2017          SOURCE FILE DURATION:  23 hours 23 minutes.         CLINICAL SUMMARY:  Courtney Oropeza is a 22-year-old female who underwent an inpatient video EEG monitoring procedure for characterization of spells with concern for possible seizures.         MEDICATIONS: No anticonvulsants at this time.         TECHNICAL SUMMARY: This video EEG monitoring procedure was performed with 23 scalp electrodes in 10-20 system placements, and additional scalp, precordial and other surface electrodes used for electrical referencing and artifact detection. No electroclinical seizures or any electrographic seizures were seen. Video was reviewed intermittently by EEG technologist and physician for clinical seizures.        BACKGROUND ACTIVITY:  During wakefulness, the background activity consists of synchronous and symmetric, well modulated, 9-10 Hz posterior dominant rhythm that attenuated with eye opening. During drowsiness, the background activity waxed and waned and there were periods of slowing and attenuation of the posterior alpha rhythm. Stage I sleep, Stage II sleep and Stage 3 sleep  was recorded in which synchronous and symmetrical vertex waves , K-complexes and sleep spindles were identified.  No focal abnormalities were observed.       ACTIVATION PROCEDURE: Photic stimulation was not done. Hyperventilation did not produce any significant abnormalities on the EEG. During hyperventilation she felt funny, she had \"prickly sensation in the hands and feet, legs were numb, and lots of pressure in the head\". These were some of her target events. During this time there was no EEG seizure activity. This was thought to be related to hyperventilation .        EPILEPTIFORM DISCHARGES: No epileptiform activity was recorded.       ICTAL:  No electrographic seizures were recorded. . Video was reviewed intermittently by EEG technologist and " "physcian for clinical seizures. During hyperventilation she felt funny, she had \"prickly sensation in the hands and feet, legs were numb, and lots of pressure in the head\". These were some of her target events. During this time there was no EEG seizure activity. This was thought to be related to hyperventilation .        IMPRESSION OF VIDEO EEG DAY #3: This is a normal awake and sleep video electroencephalogram. No electrographic seizures or epileptiform discharges were recorded. During hyperventilation she felt funny, she had \"prickly sensation in the hands and feet, legs were numb, and lots of pressure in the head\". These was a target event. During this time there was no EEG seizure activity. This was thought to be related to hyperventilation .  No electrographic seizure or epileptiform discharges were seen.          GUZMAN GARCIA MD             D: 2018   T: 2018   MT: [id number]      Name:     LUCINA BAH   MRN:      [id number]        Account:        [id number]   :      1995           Procedure Date: 2017      Document: [id number]      "

## 2018-02-01 ENCOUNTER — TELEPHONE (OUTPATIENT)
Dept: SURGERY | Facility: CLINIC | Age: 23
End: 2018-02-01

## 2018-02-01 NOTE — TELEPHONE ENCOUNTER
----- Message from EVI Vizcaino CNP sent at 2/1/2018  1:48 PM CST -----  Regarding: FW: referral to [facility]  She has since seen Dr. Cuenca.  Dr. Cuenca has ordered the step one procedure.  Dr. Cuenca will refer her to [facility] if that is appropriate.    Also, the chance of it being autoimmune GI dysmotility is remote if the constipation began at 8 weeks of age, as I found in one of my notes. The [facility] are even more important.    Kacie      ----- Message -----     From: Ela Isaacs LPN     Sent: 1/31/2018   5:18 PM       To: EVI Vizcaino CNP  Subject: FW: SB, referral to [facility]                          Also put a note on your desk.  What can I do to help.    Ashley  ----- Message -----     From: Erin Carter, RN     Sent: 1/29/2018  11:10 AM       To: Ela Isaacs LPN  Subject: SB, referral to [facility]                             Jinny [phone], Samara's mother is calling to see if Kacie has placed a referral to [facility] yet?    I don't see in EMR.  Will route to Kacie Ramirez's clinic nurse.    Thanks, Hillary

## 2018-02-01 NOTE — TELEPHONE ENCOUNTER
Returned call regarding a request for referral to [facility].Advised patient that Kacie recommends that patient follow through with instructions per Dr Paredes for a Pelvic floor referral. She would like Dr Paredes to place the referral if she feels it is necessary.Pt stated that she already had an appointment scheduled with [facility] and is not planning on doing the pelvic floor appointment until after the [facility] appointment. Patient had a referral sent to [facility] center over a year ago per Kacie Almeida NP that was cancelled per pt and not rescheduled.

## 2018-02-10 ENCOUNTER — OFFICE VISIT (OUTPATIENT)
Dept: URGENT CARE | Facility: URGENT CARE | Age: 23
End: 2018-02-10
Payer: COMMERCIAL

## 2018-02-10 VITALS
WEIGHT: 149 LBS | BODY MASS INDEX: 27.42 KG/M2 | TEMPERATURE: 98.7 F | DIASTOLIC BLOOD PRESSURE: 62 MMHG | HEIGHT: 62 IN | OXYGEN SATURATION: 100 % | HEART RATE: 114 BPM | SYSTOLIC BLOOD PRESSURE: 102 MMHG

## 2018-02-10 DIAGNOSIS — G89.29 CHRONIC PAIN OF LEFT KNEE: Primary | ICD-10-CM

## 2018-02-10 DIAGNOSIS — M25.562 CHRONIC PAIN OF LEFT KNEE: Primary | ICD-10-CM

## 2018-02-10 PROCEDURE — 99213 OFFICE O/P EST LOW 20 MIN: CPT | Performed by: FAMILY MEDICINE

## 2018-02-10 NOTE — MR AVS SNAPSHOT
After Visit Summary   2/10/2018    Samara Oropeza    MRN: [id number]           Patient Information     Date Of Birth          1995        Visit Information        Provider Department      2/10/2018 11:10 AM Susanna Meier DO [facility]        Today's Diagnoses     Chronic pain of left knee    -  1      Care Instructions    Aleve, 2 tablets, every 12 hours with food for the next day, then 1 tablet every 12 hours if/as needed.  Ice, elevate, ace wrap while awake.      Follow up with your orthopedic physician.  If you do decide to switch to someone new, call your primary provider for referral if you need one for your insurance.      If any worsening symptoms develop over the weekend -- redness, warmth, tenderness, increased swelling, etc, go to the ER for further evaluation.          Follow-ups after your visit        Your next 10 appointments already scheduled     Feb 13, 2018  2:30 PM CST   (Arrive by 2:15 PM)   Return Seizure with Sigifredo Flores MD   [facility] ([facility])    [address] [place] [id number] [phone]            Feb 27, 2018 10:15 AM CST   [facility] Hurst [facility]    [address]  [address]  [facility] [id number] [phone]            Feb 28, 2018  3:00 PM CST   (Arrive by 2:45 PM)   Return Botox with Frederick Bender MD   [facility] ([facility])    [address] [place] [id number]   [phone]            Apr 04, 2018  1:40 PM CDT   (Arrive by 1:25 PM)   Return Visit with Estela Cuenca MD   [facility] ([facility])    [address] [place] [id number]   [phone]            Apr 17, 2018 11:00 AM CDT   Return  "Visit with Austen Marquez MD   [facility]    [address] [address] [phone]              Who to contact     If you have questions or need follow up information about today's clinic visit or your schedule please contact [facility] directly at [phone].  Normal or non-critical lab and imaging results will be communicated to you by Localminthart, letter or phone within 4 business days after the clinic has received the results. If you do not hear from us within 7 days, please contact the clinic through [email or web address] or phone. If you have a critical or abnormal lab result, we will notify you by phone as soon as possible.  Submit refill requests through [employer] or call your pharmacy and they will forward the refill request to us. Please allow 3 business days for your refill to be completed.          Additional Information About Your Visit        Localminthar"[employer] Information     Sepaton gives you secure access to your electronic health record. If you see a primary care provider, you can also send messages to your care team and make appointments. If you have questions, please call your primary care clinic.  If you do not have a primary care provider, please call [phone] and they will assist you.        Care EveryWhere ID     This is your Care EveryWhere ID. This could be used by other organizations to access your [facility] medical records  [id number]        Your Vitals Were     Pulse Temperature Height Pulse Oximetry BMI (Body Mass Index)       114 98.7  F (37.1  C) (Oral) 5' 2.25\" (1.581 m) 100% 27.03 kg/m2        Blood Pressure from Last 3 Encounters:   02/10/18 102/62   01/29/18 125/85   01/17/18 103/65    Weight from Last 3 Encounters:   02/10/18 149 lb (67.6 kg)   01/29/18 150 lb 14.4 oz (68.4 kg)   01/16/18 150 lb (68 kg)              Today, you had the following     No orders found for display       " Primary Care Provider Office Phone # Fax #    EVI Cardona [phone]       [address] [facility] [address]  [facility] [address]        Equal Access to Services     NABIL GALEANO : Hadii malcolm ku hadprimoo Soomaali, waaxda luqadaha, qaybta kaalmada adeegyada, waxjarred lennonn maddie crane lapreston parsons. So [facility] [phone].    ATENCIÓN: Si habla español, tiene a livingston disposición servicios gratuitos de asistencia lingüística. Llame al [phone].    We comply with applicable federal civil rights laws and [place] laws. We do not discriminate on the basis of race, color, national origin, age, disability, sex, sexual orientation, or gender identity.            Thank you!     Thank you for choosing [facility]  for your care. Our goal is always to provide you with excellent care. Hearing back from our patients is one way we can continue to improve our services. Please take a few minutes to complete the written survey that you may receive in the mail after your visit with us. Thank you!             Your Updated Medication List - Protect others around you: Learn how to safely use, store and throw away your medicines at www.disposemymeds.org.          This list is accurate as of 2/10/18 11:46 AM.  Always use your most recent med list.                   Brand Name Dispense Instructions for use Diagnosis    albuterol 108 (90 BASE) MCG/ACT Inhaler    PROAIR HFA/PROVENTIL HFA/VENTOLIN HFA    1 Inhaler    Inhale 2 puffs into the lungs every 6 hours as needed for shortness of breath / dyspnea or wheezing    Atypical chest pain       amitriptyline 50 MG tablet    ELAVIL    30 tablet    Take 1 tablet (50 mg) by mouth At Bedtime    Abdominal pain, generalized, Insomnia due to medical condition       apremilast 30 MG tablet    OTEZLA     Take 1 tablet (30 mg) by mouth 2 times daily    Behcet's disease (H)       ASPIRIN CHILDRENS 81 MG chewable tablet   Generic drug:  aspirin      Take 81 mg  by mouth as needed        aspirin-acetaminophen-caffeine 250-250-65 MG per tablet    EXCEDRIN MIGRAINE     Take 1 tablet by mouth every 6 hours as needed for headaches        benzocaine 20 % Gel    TOPICALE XTRA    30 g    Apply as needed locally to mouth or nasal ulcers for pain; 4 times daily as needed    Behcet's disease (H)       betamethasone valerate 0.1 % cream    VALISONE     Apply topically 2 times daily        * botulinum toxin type A 100 UNITS injection    BOTOX    200 Units    Inject 200 Units into the muscle every 3 months    Cervical dystonia       * BOTOX IJ      Inject 165 Units into the muscle once Lot /C3 Exp 06/2020        carbamide peroxide 6.5 % otic solution    DEBROX     5 drops 2 times daily        clobetasol 0.05 % cream    TEMOVATE    60 g    Apply topically 2 times daily    Folliculitis       colchicine 0.6 MG tablet     270 tablet    TAKE 2 TABLETS BY MOUTH EVERY MORNING AND 1 TABLET EVERY EVENING.    Behcet's disease (H)       COMPOUNDED NON-CONTROLLED SUBSTANCE - PHARMACY TO MIX COMPOUNDED MEDICATION    CMPD RX    30 capsule    Take one capsule in the morning    Fibromyalgia       diclofenac 1 % Gel topical gel    VOLTAREN    100 g    Apply 2-4 grams to affected area(s) up to 4 times per day as needed. This is an anti-inflammatory medication.    Polyarthralgia       dicyclomine 20 MG tablet    BENTYL    120 tablet    Take 1 tablet (20 mg) by mouth 4 times daily as needed    Abdominal cramping       diltiazem 2% in PLO cream (FV COMPOUNDED) 2% Gel     30 g    Apply small pea size amount three times daily to anus until pain is gone.    Anal fissure       diphenhydrAMINE 25 MG tablet    BENADRYL    30 tablet    Take 1 tablet (25 mg) by mouth every 8 hours as needed for allergies        EPINEPHrine 0.3 MG/0.3ML injection 2-pack    EPIPEN 2-EAMON    0.6 mL    Inject 0.3 mLs (0.3 mg) into the muscle as needed for anaphylaxis    Hx of bee sting allergy       guanFACINE 1 MG tablet    TENEX     270 tablet    Take 3 tablets (3 mg) by mouth 2 times daily    Tic       hydrOXYzine 25 MG tablet    ATARAX    90 tablet    Take 1-2 tablets (25-50 mg) by mouth every 6 hours as needed for anxiety    TAHIR (generalized anxiety disorder)       hyoscyamine 0.125 MG tablet    ANASPAZ/LEVSIN    40 tablet    Take 1-2 tablets (125-250 mcg) by mouth every 4 hours as needed for cramping    Abdominal pain, generalized       hypromellose 0.3 % Soln ophthalmic solution    GENTEAL     1 drop every hour as needed for dry eyes        LACTAID PO      Take by mouth daily        lactulose 20 GM/30ML Soln     300 mL    Take 30 mLs by mouth 3 times daily as needed    Constipation, unspecified constipation type       lidocaine 2 % topical gel    XYLOCAINE     Apply 1 Tube topically daily Reported on 4/21/2017        lidocaine visc 2% 2.5mL/5mL & maalox/mylanta w/ simeth 2.5mL/5mL & diphenhydrAMINE 5mg/5mL Susp suspension    [facility] Mouthwash [facility]    1 Bottle    Swish and swallow 10 mLs in mouth every 6 hours as needed for mouth sores    Behcet's syndrome (H)       linaclotide 145 MCG capsule    LINZESS    90 capsule    Take 1 capsule (145 mcg) by mouth every morning (before breakfast)    Chronic idiopathic constipation       LORazepam 1 MG tablet    ATIVAN    60 tablet    Take 0.5 tablets (0.5 mg) by mouth 2 times daily as needed for other (atypical chest pain) Do not operate a vehicle after taking this medication    Chronic abdominal pain       lubiprostone 24 MCG capsule    AMITIZA    30 capsule    Take 1 capsule (24 mcg) by mouth 2 times daily (with meals)    Chronic idiopathic constipation       omeprazole 40 MG capsule    priLOSEC    90 capsule    Take 1 capsule (40 mg) by mouth daily    Gastroesophageal reflux disease, esophagitis presence not specified       ondansetron 8 MG ODT tab    ZOFRAN-ODT    60 tablet    Take 1 tablet (8 mg) by mouth every 8 hours as needed for nausea    Non-intractable vomiting with nausea,  unspecified vomiting type, Hematemesis, presence of nausea not specified       SPRINTEC 28 0.25-35 MG-MCG per tablet   Generic drug:  norgestimate-ethinyl estradiol     84 tablet    TAKE 1 TABLET BY MOUTH ONCE DAILY.    General counseling for prescription of oral contraceptives       sucralfate 1 GM/10ML suspension    CARAFATE    1200 mL    Take 10 mLs (1 g) by mouth 4 times daily    Bile reflux gastritis, Nausea       triamcinolone 0.1 % cream    KENALOG    15 g    Apply sparingly to oral ulcers three times daily for 14 days as needed.    Behcet's syndrome (H)       * Notice:  This list has 2 medication(s) that are the same as other medications prescribed for you. Read the directions carefully, and ask your doctor or other care provider to review them with you.

## 2018-02-10 NOTE — NURSING NOTE
"Samara Oropeza;   Chief Complaint   Patient presents with     Knee Pain     left knee pain, swelling and painful x 2 weeks, surgery on that knee a few years ago      Urgent Care     Initial /62 (BP Location: Right arm, Patient Position: Chair, Cuff Size: Adult Regular)  Pulse 114  Temp 98.7  F (37.1  C) (Oral)  Ht 5' 2.25\" (1.581 m)  Wt 149 lb (67.6 kg)  SpO2 100%  BMI 27.03 kg/m2 Estimated body mass index is 27.03 kg/(m^2) as calculated from the following:    Height as of this encounter: 5' 2.25\" (1.581 m).    Weight as of this encounter: 149 lb (67.6 kg)..  BP completed using cuff size regular.  Catina Kendrick R.N.  "

## 2018-02-10 NOTE — PROGRESS NOTES
"SUBJECTIVE:   Samara Oropeza is a 22 year old female presenting with a chief complaint of left knee swelling/pain and wondering if any infection present.   She had a patella surgery done a couple of years ago.  Since then she has had issues with chronic swelling and intermittent aching pain.  She has been seen multiple times with ortho and is doing physical therapy.  She has a h/o autoimmune disease and has fevers intermittently.  The past 2 weeks, her knee has been more painful and she has had occasional fevers to 101 during this 2 week period.  No cough/cold symptoms.  No nausea/vomiting/diarrhea.  No redness or warmth of the knee has been noticed.  She did have an MRI in the past that showed an effusion that has been there for some time.  Knee is always a little puffy.     ROS:  5 point review of symptoms negative other than positives stated above.    OBJECTIVE  /62 (BP Location: Right arm, Patient Position: Chair, Cuff Size: Adult Regular)  Pulse 114  Temp 98.7  F (37.1  C) (Oral)  Ht 5' 2.25\" (1.581 m)  Wt 149 lb (67.6 kg)  SpO2 100%  BMI 27.03 kg/m2  GENERAL:  Awake, alert and interactive. No acute distress.  Left knee:  Minor generalized swelling to the lateral knee.  No erythema, bruising or tenderness with palpation.  No warmth.  Multiple incision sites evident.   No swelling or tenderness behind the knee.     ASSESSMENT/PLAN    ICD-10-CM    1. Chronic pain of left knee M25.562     G89.29      no sign of acute infection at this time.   Ace wrap applied for comfort/compression.  F/u with ortho next week for ongoing swelling and pain.  To ER if any redness, increased swelling, or pain develop.    Patient Instructions   Aleve, 2 tablets, every 12 hours with food for the next day, then 1 tablet every 12 hours if/as needed.  Ice, elevate, ace wrap while awake.      Follow up with your orthopedic physician.  If you do decide to switch to someone new, call your primary provider for referral if you " need one for your insurance.      If any worsening symptoms develop over the weekend -- redness, warmth, tenderness, increased swelling, etc, go to the ER for further evaluation.

## 2018-02-10 NOTE — PATIENT INSTRUCTIONS
Aleve, 2 tablets, every 12 hours with food for the next day, then 1 tablet every 12 hours if/as needed.  Ice, elevate, ace wrap while awake.      Follow up with your orthopedic physician.  If you do decide to switch to someone new, call your primary provider for referral if you need one for your insurance.      If any worsening symptoms develop over the weekend -- redness, warmth, tenderness, increased swelling, etc, go to the ER for further evaluation.

## 2018-02-13 ENCOUNTER — OFFICE VISIT (OUTPATIENT)
Dept: NEUROLOGY | Facility: CLINIC | Age: 23
End: 2018-02-13
Payer: COMMERCIAL

## 2018-02-13 VITALS
BODY MASS INDEX: 28.17 KG/M2 | SYSTOLIC BLOOD PRESSURE: 117 MMHG | HEART RATE: 100 BPM | DIASTOLIC BLOOD PRESSURE: 63 MMHG | HEIGHT: 62 IN | WEIGHT: 153.1 LBS

## 2018-02-13 DIAGNOSIS — R55 VASOVAGAL SYNCOPE: Primary | ICD-10-CM

## 2018-02-13 DIAGNOSIS — G47.19 EXCESSIVE DAYTIME SLEEPINESS: ICD-10-CM

## 2018-02-13 ASSESSMENT — PAIN SCALES - GENERAL: PAINLEVEL: SEVERE PAIN (7)

## 2018-02-13 NOTE — PROGRESS NOTES
DEPARTMENT OF NEUROLOGY  Patient Name: Samara Oropeza  MRN: [id number]  : 1995  Date of Clinic Visit: 2018  Primary Care Provider: Sonja Abreu    FOLLOW-UP FOR: spells of undetermined etiology    INTERVAL HISTORY:  Samara Oropeza is a 22 year old female presenting for follow-up for spells of undetermined etiology. She presents with her boyfriend who provides corroborating history. Courtney has 3 types of spells, all of which had their onset in .   1.)  Convulsions preceded by sensation of chest pain, lightheadedness, and dyspnea with loss of consciousness.  There is occasionally also some limb shaking with an associated postictal state.   2.)  Falls with unconsciousness, no convulsions.  Frequency is approximately 1-2 times per month.  Last episode was in 2017.   3.)  Staring spells in which she maintains posture, duration is approximately 10 seconds, occurs approximately once a month.   Courtney was admitted from  - 2017 for seizure characterization.  During her admission she did not experience any characteristics spells, although she did complain of some paresthesias with hyperventilation.  No seizures were observed and there were no epileptiform discharges. She also underwent tilt table testing which was strongly positive.   Additional workup for characterization of her spells includes an MRI brain without contrast obtained in 2016.  There were no major structural abnormalities but they were a few scattered punctate white matter lesions, probably a result of her headache history.   Since she was discharged in December, she reports initially only having 1 episode of Type 1 spell in late December. She estimates the duration was approximately 5 minutes.   Later during the appointment, she revealed that she has been having episodes of sleep paralysis. She says that she will wake up from sleep, even sleep as short as 5 minutes in  duration, and she will intermittently be unable to move her limbs. This can be bilateral or on the left side only. She also says that she has been falling asleep even during conversations. She endorses some cognitive issues, some slow thinking, very poor sleep (she gets only 2-3 hours of restful sleep at night), and severe pain.    INVESTIGATIONS:  MRI Brain w/o contrast 10/25/17:  Impression:  1. No epileptogenic focus identified.  2. Minimal nonspecific white matter T2 hyperintensities    EEG December 2017:  SUMMARY OF 4 DAYS OF VIDEO EEG:  Video EEG during 4 days was normal. EEG day #2, after hyperventilation, the patient reported tingling in her hands and feet.  She did not have alteration of awareness and behavioral testing was done.  There were no EEG changes to suggest seizure activity.  On day 3 she had another spell during hyperventilation where she felt funny and her hands and feet felt prickly/pressure in head.  She later explained that as a tingling sensation.  Orientation questions were completed and patient was able to answer everything and had no overt loss of awareness during this time.  Again, EEG during this time had no seizure activity.  During the 4 days of video EEG recording, patient had no electrographic seizures, no epileptiform discharges and her EEG was essentially normal.    REVIEW OF SYSTEMS: 12-point RoS negative except as per HPI.    MEDICATIONS:  Current Outpatient Prescriptions   Medication Sig Dispense Refill     lubiprostone (AMITIZA) 24 MCG capsule Take 1 capsule (24 mcg) by mouth 2 times daily (with meals) 30 capsule 1     benzocaine (TOPICALE XTRA) 20 % GEL Apply as needed locally to mouth or nasal ulcers for pain; 4 times daily as needed 30 g 1     colchicine 0.6 MG tablet TAKE 2 TABLETS BY MOUTH EVERY MORNING AND 1 TABLET EVERY EVENING. 270 tablet 0     apremilast (OTEZLA) 30 MG tablet Take 1 tablet (30 mg) by mouth 2 times daily       linaclotide (LINZESS) 145 MCG capsule Take  1 capsule (145 mcg) by mouth every morning (before breakfast) 90 capsule 1     dicyclomine (BENTYL) 20 MG tablet Take 1 tablet (20 mg) by mouth 4 times daily as needed 120 tablet 3     aspirin-acetaminophen-caffeine (EXCEDRIN MIGRAINE) 250-250-65 MG per tablet Take 1 tablet by mouth every 6 hours as needed for headaches       SPRINTEC 28 0.25-35 MG-MCG per tablet TAKE 1 TABLET BY MOUTH ONCE DAILY. 84 tablet 2     hydrOXYzine (ATARAX) 25 MG tablet Take 1-2 tablets (25-50 mg) by mouth every 6 hours as needed for anxiety 90 tablet 1     OnabotulinumtoxinA (BOTOX IJ) Inject 165 Units into the muscle once Lot /C3  Exp 06/2020       guanFACINE (TENEX) 1 MG tablet Take 3 tablets (3 mg) by mouth 2 times daily 270 tablet 3     diltiazem 2% in PLO cream, FV COMPOUNDED, 2% GEL Apply small pea size amount three times daily to anus until pain is gone. 30 g 1     COMPOUNDED NON-CONTROLLED SUBSTANCE (CMPD RX) - PHARMACY TO MIX COMPOUNDED MEDICATION Take one capsule in the morning 30 capsule 0     amitriptyline (ELAVIL) 50 MG tablet Take 1 tablet (50 mg) by mouth At Bedtime 30 tablet 11     lactulose 20 GM/30ML SOLN Take 30 mLs by mouth 3 times daily as needed 300 mL 3     LORazepam (ATIVAN) 1 MG tablet Take 0.5 tablets (0.5 mg) by mouth 2 times daily as needed for other (atypical chest pain) Do not operate a vehicle after taking this medication 60 tablet 0     ondansetron (ZOFRAN-ODT) 8 MG ODT tab Take 1 tablet (8 mg) by mouth every 8 hours as needed for nausea 60 tablet 6     diphenhydrAMINE (BENADRYL) 25 MG tablet Take 1 tablet (25 mg) by mouth every 8 hours as needed for allergies 30 tablet 0     sucralfate (CARAFATE) 1 GM/10ML suspension Take 10 mLs (1 g) by mouth 4 times daily 1200 mL 2     diclofenac (VOLTAREN) 1 % GEL topical gel Apply 2-4 grams to affected area(s) up to 4 times per day as needed. This is an anti-inflammatory medication. 100 g 4     aspirin (ASPIRIN CHILDRENS) 81 MG chewable tablet Take 81 mg by mouth  "as needed        omeprazole (PRILOSEC) 40 MG capsule Take 1 capsule (40 mg) by mouth daily 90 capsule 3     EPINEPHrine (EPIPEN 2-EAMON) 0.3 MG/0.3ML injection Inject 0.3 mLs (0.3 mg) into the muscle as needed for anaphylaxis 0.6 mL 3     albuterol (PROAIR HFA/PROVENTIL HFA/VENTOLIN HFA) 108 (90 BASE) MCG/ACT Inhaler Inhale 2 puffs into the lungs every 6 hours as needed for shortness of breath / dyspnea or wheezing 1 Inhaler 1     Magic Mouthwash (FV std formula) lidocaine visc 2% 2.5mL/5mL & maalox/mylanta w/ simeth 2.5mL/5mL & diphenhydrAMINE 5mg/5mL Swish and swallow 10 mLs in mouth every 6 hours as needed for mouth sores 1 Bottle 1     clobetasol (TEMOVATE) 0.05 % cream Apply topically 2 times daily 60 g 0     botulinum toxin type A (BOTOX) 100 UNITS injection Inject 200 Units into the muscle every 3 months 200 Units 3     hyoscyamine (ANASPAZ,LEVSIN) 0.125 MG tablet Take 1-2 tablets (125-250 mcg) by mouth every 4 hours as needed for cramping 40 tablet 1     hypromellose (GENTEAL) 0.3 % SOLN 1 drop every hour as needed for dry eyes        betamethasone valerate (VALISONE) 0.1 % cream Apply topically 2 times daily       carbamide peroxide (DEBROX) 6.5 % otic solution 5 drops 2 times daily       Lactase (LACTAID PO) Take by mouth daily       lidocaine (XYLOCAINE) 2 % jelly Apply 1 Tube topically daily Reported on 4/21/2017       triamcinolone (KENALOG) 0.1 % cream Apply sparingly to oral ulcers three times daily for 14 days as needed. 15 g 1     PHYSICAL EXAMINATION:  Vitals: /63 (BP Location: Left arm, Patient Position: Chair, Cuff Size: Adult Regular)  Pulse 100  Ht 1.581 m (5' 2.25\")  Wt 69.4 kg (153 lb 1.6 oz)  BMI 27.78 kg/m2  General: sitting in chair, NAD  HEENT: normocephalic  Extremities: no edema  Skin: no rashes, no bruising  Psych: flat affect, reduced prosody of speech, minimal eye contact  Neuro: alert; language is fluent with intact comprehension, conjugate gaze, EOMI without nystagmus, no " facial asymmetry or ptosis, hearing intact to conversation, no dysarthria or hypophonia; no abnormal posturing or limb movements, moves all extremities equally against gravity; no ataxia or dysmetria; station and gait are normal.      ASSESSMENT AND PLAN:  Samara Oropeza is a 22-year-old presenting for follow-up after long-term EEG monitoring. While her EEG was normal, her tilt table testing was strongly positive. Given her endorsement of lightheadedness, chest pain, and loss of consciousness, we think that her picture is strongly positive for a cardiogenic etiology of syncope. We would therefore like her to be evaluated by cardiology. She might benefit from long-term cardiac monitoring from a Ziopatch, which might demonstrate cardiac rhythm disturbances associated with her symptoms. Alternatively, the spells could represent sleep attacks as she endorses significant daytime somnolence and poor nighttime sleep. We will have her evaluated by sleep specialist. Finally, there might be a significant psychiatric component underlying her spells. She endorses significant depressive symptoms and on exam today had a very flat affect. Depression could certainly be underlying these spells directly, if not indirectly affecting her sleep which then influences her spells. She says she has a behavioral therapy appointment coming up which we urged her to keep.    Plan:  - Cardiology referral for syncopal episodes, she might benefit from a Ziopatch for cardiac monitoring during her spells  - Sleep Clinic referral for excessive daytime sleepiness  - She was urged to keep her therapy appointment    Patient was seen and discussed with Dr. Flores.    Best Morgan MD  Neurology PGY3  [facility]  Pager: [phone]    Report Prepared By: Best Morgan MD, Neurology Resident  I agree with the findings and plan of care as documented.  I personally examined the patient, and discussed our diagnostic impressions and  therapeutic recommendations with the patient.  She was agreeable to this plan.      I spent 25 minutes in this patient care, more than 50% of which consisted of counseling and coordinating care.       Sigifredo Flores M.D.   Professor of Neurology

## 2018-02-13 NOTE — LETTER
2018     RE: Samara Oropeza  [address]   [place] [address]     Dear Colleague,    Thank you for referring your patient, Samara Oropeza, to the [facility] NEUROLOGY at [facility]. Please see a copy of my visit note below.      DEPARTMENT OF NEUROLOGY  Patient Name: Samara Oropeza  MRN: [id number]  : 1995  Date of Clinic Visit: 2018  Primary Care Provider: Sonja Abreu    FOLLOW-UP FOR: spells of undetermined etiology    INTERVAL HISTORY:  Samara Oropeza is a 22 year old female presenting for follow-up for spells of undetermined etiology. She presents with her boyfriend who provides corroborating history. Courtney has 3 types of spells, all of which had their onset in .   1.)  Convulsions preceded by sensation of chest pain, lightheadedness, and dyspnea with loss of consciousness.  There is occasionally also some limb shaking with an associated postictal state.   2.)  Falls with unconsciousness, no convulsions.  Frequency is approximately 1-2 times per month.  Last episode was in 2017.   3.)  Staring spells in which she maintains posture, duration is approximately 10 seconds, occurs approximately once a month.   Courtney was admitted from  - 2017 for seizure characterization.  During her admission she did not experience any characteristics spells, although she did complain of some paresthesias with hyperventilation.  No seizures were observed and there were no epileptiform discharges. She also underwent tilt table testing which was strongly positive.   Additional workup for characterization of her spells includes an MRI brain without contrast obtained in 2016.  There were no major structural abnormalities but they were a few scattered punctate white matter lesions, probably a result of her headache history.   Since she was discharged in December, she reports initially  only having 1 episode of Type 1 spell in late December. She estimates the duration was approximately 5 minutes.   Later during the appointment, she revealed that she has been having episodes of sleep paralysis. She says that she will wake up from sleep, even sleep as short as 5 minutes in duration, and she will intermittently be unable to move her limbs. This can be bilateral or on the left side only. She also says that she has been falling asleep even during conversations. She endorses some cognitive issues, some slow thinking, very poor sleep (she gets only 2-3 hours of restful sleep at night), and severe pain.    INVESTIGATIONS:  MRI Brain w/o contrast 10/25/17:  Impression:  1. No epileptogenic focus identified.  2. Minimal nonspecific white matter T2 hyperintensities    EEG December 2017:  SUMMARY OF 4 DAYS OF VIDEO EEG:  Video EEG during 4 days was normal. EEG day #2, after hyperventilation, the patient reported tingling in her hands and feet.  She did not have alteration of awareness and behavioral testing was done.  There were no EEG changes to suggest seizure activity.  On day 3 she had another spell during hyperventilation where she felt funny and her hands and feet felt prickly/pressure in head.  She later explained that as a tingling sensation.  Orientation questions were completed and patient was able to answer everything and had no overt loss of awareness during this time.  Again, EEG during this time had no seizure activity.  During the 4 days of video EEG recording, patient had no electrographic seizures, no epileptiform discharges and her EEG was essentially normal.    REVIEW OF SYSTEMS: 12-point RoS negative except as per HPI.    MEDICATIONS:  Current Outpatient Prescriptions   Medication Sig Dispense Refill     lubiprostone (AMITIZA) 24 MCG capsule Take 1 capsule (24 mcg) by mouth 2 times daily (with meals) 30 capsule 1     benzocaine (TOPICALE XTRA) 20 % GEL Apply as needed locally to mouth or  nasal ulcers for pain; 4 times daily as needed 30 g 1     colchicine 0.6 MG tablet TAKE 2 TABLETS BY MOUTH EVERY MORNING AND 1 TABLET EVERY EVENING. 270 tablet 0     apremilast (OTEZLA) 30 MG tablet Take 1 tablet (30 mg) by mouth 2 times daily       linaclotide (LINZESS) 145 MCG capsule Take 1 capsule (145 mcg) by mouth every morning (before breakfast) 90 capsule 1     dicyclomine (BENTYL) 20 MG tablet Take 1 tablet (20 mg) by mouth 4 times daily as needed 120 tablet 3     aspirin-acetaminophen-caffeine (EXCEDRIN MIGRAINE) 250-250-65 MG per tablet Take 1 tablet by mouth every 6 hours as needed for headaches       SPRINTEC 28 0.25-35 MG-MCG per tablet TAKE 1 TABLET BY MOUTH ONCE DAILY. 84 tablet 2     hydrOXYzine (ATARAX) 25 MG tablet Take 1-2 tablets (25-50 mg) by mouth every 6 hours as needed for anxiety 90 tablet 1     OnabotulinumtoxinA (BOTOX IJ) Inject 165 Units into the muscle once Lot /C3  Exp 06/2020       guanFACINE (TENEX) 1 MG tablet Take 3 tablets (3 mg) by mouth 2 times daily 270 tablet 3     diltiazem 2% in PLO cream, FV COMPOUNDED, 2% GEL Apply small pea size amount three times daily to anus until pain is gone. 30 g 1     COMPOUNDED NON-CONTROLLED SUBSTANCE (CMPD RX) - PHARMACY TO MIX COMPOUNDED MEDICATION Take one capsule in the morning 30 capsule 0     amitriptyline (ELAVIL) 50 MG tablet Take 1 tablet (50 mg) by mouth At Bedtime 30 tablet 11     lactulose 20 GM/30ML SOLN Take 30 mLs by mouth 3 times daily as needed 300 mL 3     LORazepam (ATIVAN) 1 MG tablet Take 0.5 tablets (0.5 mg) by mouth 2 times daily as needed for other (atypical chest pain) Do not operate a vehicle after taking this medication 60 tablet 0     ondansetron (ZOFRAN-ODT) 8 MG ODT tab Take 1 tablet (8 mg) by mouth every 8 hours as needed for nausea 60 tablet 6     diphenhydrAMINE (BENADRYL) 25 MG tablet Take 1 tablet (25 mg) by mouth every 8 hours as needed for allergies 30 tablet 0     sucralfate (CARAFATE) 1 GM/10ML  "suspension Take 10 mLs (1 g) by mouth 4 times daily 1200 mL 2     diclofenac (VOLTAREN) 1 % GEL topical gel Apply 2-4 grams to affected area(s) up to 4 times per day as needed. This is an anti-inflammatory medication. 100 g 4     aspirin (ASPIRIN CHILDRENS) 81 MG chewable tablet Take 81 mg by mouth as needed        omeprazole (PRILOSEC) 40 MG capsule Take 1 capsule (40 mg) by mouth daily 90 capsule 3     EPINEPHrine (EPIPEN 2-EAMON) 0.3 MG/0.3ML injection Inject 0.3 mLs (0.3 mg) into the muscle as needed for anaphylaxis 0.6 mL 3     albuterol (PROAIR HFA/PROVENTIL HFA/VENTOLIN HFA) 108 (90 BASE) MCG/ACT Inhaler Inhale 2 puffs into the lungs every 6 hours as needed for shortness of breath / dyspnea or wheezing 1 Inhaler 1     Magic Mouthwash (FV std formula) lidocaine visc 2% 2.5mL/5mL & maalox/mylanta w/ simeth 2.5mL/5mL & diphenhydrAMINE 5mg/5mL Swish and swallow 10 mLs in mouth every 6 hours as needed for mouth sores 1 Bottle 1     clobetasol (TEMOVATE) 0.05 % cream Apply topically 2 times daily 60 g 0     botulinum toxin type A (BOTOX) 100 UNITS injection Inject 200 Units into the muscle every 3 months 200 Units 3     hyoscyamine (ANASPAZ,LEVSIN) 0.125 MG tablet Take 1-2 tablets (125-250 mcg) by mouth every 4 hours as needed for cramping 40 tablet 1     hypromellose (GENTEAL) 0.3 % SOLN 1 drop every hour as needed for dry eyes        betamethasone valerate (VALISONE) 0.1 % cream Apply topically 2 times daily       carbamide peroxide (DEBROX) 6.5 % otic solution 5 drops 2 times daily       Lactase (LACTAID PO) Take by mouth daily       lidocaine (XYLOCAINE) 2 % jelly Apply 1 Tube topically daily Reported on 4/21/2017       triamcinolone (KENALOG) 0.1 % cream Apply sparingly to oral ulcers three times daily for 14 days as needed. 15 g 1     PHYSICAL EXAMINATION:  Vitals: /63 (BP Location: Left arm, Patient Position: Chair, Cuff Size: Adult Regular)  Pulse 100  Ht 1.581 m (5' 2.25\")  Wt 69.4 kg (153 lb 1.6 " oz)  BMI 27.78 kg/m2  General: sitting in chair, NAD  HEENT: normocephalic  Extremities: no edema  Skin: no rashes, no bruising  Psych: flat affect, reduced prosody of speech, minimal eye contact  Neuro: alert; language is fluent with intact comprehension, conjugate gaze, EOMI without nystagmus, no facial asymmetry or ptosis, hearing intact to conversation, no dysarthria or hypophonia; no abnormal posturing or limb movements, moves all extremities equally against gravity; no ataxia or dysmetria; station and gait are normal.    ASSESSMENT AND PLAN:  Samara Oropeza is a 22-year-old presenting for follow-up after long-term EEG monitoring. While her EEG was normal, her tilt table testing was strongly positive. Given her endorsement of lightheadedness, chest pain, and loss of consciousness, we think that her picture is strongly positive for a cardiogenic etiology of syncope. We would therefore like her to be evaluated by cardiology. She might benefit from long-term cardiac monitoring from a Ziopatch, which might demonstrate cardiac rhythm disturbances associated with her symptoms. Alternatively, the spells could represent sleep attacks as she endorses significant daytime somnolence and poor nighttime sleep. We will have her evaluated by sleep specialist. Finally, there might be a significant psychiatric component underlying her spells. She endorses significant depressive symptoms and on exam today had a very flat affect. Depression could certainly be underlying these spells directly, if not indirectly affecting her sleep which then influences her spells. She says she has a behavioral therapy appointment coming up which we urged her to keep.    Plan:  - Cardiology referral for syncopal episodes, she might benefit from a Ziopatch for cardiac monitoring during her spells  - Sleep Clinic referral for excessive daytime sleepiness  - She was urged to keep her therapy appointment    Patient was seen and discussed with   Mark.    Best Morgan MD  Neurology PGY3  [facility]  Pager: [phone]    Report Prepared By: Best Morgan MD, Neurology Resident  I agree with the findings and plan of care as documented.  I personally examined the patient, and discussed our diagnostic impressions and therapeutic recommendations with the patient.  She was agreeable to this plan.      I spent 25 minutes in this patient care, more than 50% of which consisted of counseling and coordinating care.       Sigifredo Flores M.D.   Professor of Neurology

## 2018-02-13 NOTE — MR AVS SNAPSHOT
After Visit Summary   2/13/2018    Samara Oropeza    MRN: [id number]           Patient Information     Date Of Birth          1995        Visit Information        Provider Department      2/13/2018 2:30 PM Sigifredo Flores MD [facility] Neurology        Today's Diagnoses     Vasovagal syncope    -  1    Excessive daytime sleepiness           Follow-ups after your visit        Additional Services     CARDIOLOGY EVAL ADULT REFERRAL       Please be aware that coverage of these services is subject to the terms and limitations of your health insurance plan.  Call member services at your health plan with any benefit or coverage questions.      Please bring the following to your appointment:  Any x-rays, CTs or MRIs which have been performed. Contact the facility where they were done to arrange for  prior to your scheduled appointment.    List of current medications  This referral request   Any documents/labs given to you for this referral    Additional information including address information can be found on the web at:  Kristi Natrona [phone] https://www.[email or web address].[email or web address]/locations/[facility]/kuczeugy-lajtkfl-dxmdpniky [place] [phone] https://www.[email or web address]/locations/[facility]/udebpjib-wjquanl-zhkmdkpNew[facility] [phone] https://www.[email or web address]/locations/[facility] [phone] https://www.[employer].org/locations/[facility] [phone] https://www.[employer].org/locations/buildings/[facility] [phone]  https://www.[employer].org/locations/[facility]/ebloazxv-ojaatr-sziznfavacenter  [place] [phone]  https://www.[employer].org/locations/[facility]/bnnqavvr-zzbxizndg-djsfakroLong [facility] [phone]  https://www.[employer].org/locations/[facility]/clinics-and-surgery-center  [place] [phone]    [email or web address]://www.[email or web address]/[place]               SLEEP EVALUATION & MANAGEMENT REFERRAL - ADULT -[facility] / [facility]  [phone] (Age 2 and up)       Please be aware that coverage of these services is subject to the terms and limitations of your health insurance plan.  Call member services at your health plan with any benefit or coverage questions.      Please bring the following to your appointment:    >>   List of current medications   >>   This referral request   >>   Any documents/labs given to you for this referral                      Your next 10 appointments already scheduled     Feb 27, 2018 10:15 AM CST   [facility] Cata Hurst NP   [facility]    [address]  [address]  [facility] [address] [phone]            Feb 28, 2018  3:00 PM CST   (Arrive by 2:45 PM)   Return Botox with Frederick Bender MD   [facility] ([facility])    [address] [place] [address]  [address] [facility] [id number] [phone]            Apr 04, 2018  1:40 PM CDT   (Arrive by 1:25 PM)   Return Visit with Estela Cuenca MD   [facility] ([facility])    [address] [place] [address]  [address] [facility] [address] [phone]            Apr 17, 2018 11:00 AM CDT   Return Visit with Austen Marquez MD   [facility]    [address] [address] [phone]              Who to contact     Please call your clinic at [phone] to:    Ask questions about your health    Make or cancel appointments    Discuss your medicines    Learn about your test results    Speak to your doctor            Additional Information About Your Visit        MyChart Information     [employer] gives you secure access to your electronic health record. If you see a  "primary care provider, you can also send messages to your care team and make appointments. If you have questions, please call your primary care clinic.  If you do not have a primary care provider, please call [phone] and they will assist you.      [employer] is an electronic gateway that provides easy, online access to your medical records. With [employer], you can request a clinic appointment, read your test results, renew a prescription or communicate with your care team.     To access your existing account, please contact your [facility] or call [phone] for assistance.        Care EveryWhere ID     This is your Care EveryWhere ID. This could be used by other organizations to access your [facility] medical records  [id number]        Your Vitals Were     Pulse Height BMI (Body Mass Index)             100 5' 2.25\" (158.1 cm) 27.78 kg/m2          Blood Pressure from Last 3 Encounters:   02/13/18 117/63   02/10/18 102/62   01/29/18 125/85    Weight from Last 3 Encounters:   02/13/18 153 lb 1.6 oz (69.4 kg)   02/10/18 149 lb (67.6 kg)   01/29/18 150 lb 14.4 oz (68.4 kg)              We Performed the Following     CARDIOLOGY EVAL ADULT REFERRAL        Primary Care Provider Office Phone # Fax #    EVI Cardona [facility] [phone]       [address]  [facility] [address]        Equal Access to Services     NABIL GALEANO : Hadii aad ku hadasho Soomaali, waaxda luqadaha, qaybta kaalmada adeegyada, mike sanchesin ruy rodriguez . So [facility] [phone].    ATENCIÓN: Si habla español, tiene a livingston disposición servicios gratuitos de asistencia lingüística. Llame al [phone].    We comply with applicable federal civil rights laws and [place] laws. We do not discriminate on the basis of race, color, national origin, age, disability, sex, sexual orientation, or gender identity.            Thank you!     Thank you for choosing [facility]  for " your care. Our goal is always to provide you with excellent care. Hearing back from our patients is one way we can continue to improve our services. Please take a few minutes to complete the written survey that you may receive in the mail after your visit with us. Thank you!             Your Updated Medication List - Protect others around you: Learn how to safely use, store and throw away your medicines at www.disposemymeds.org.          This list is accurate as of 2/13/18 11:59 PM.  Always use your most recent med list.                   Brand Name Dispense Instructions for use Diagnosis    albuterol 108 (90 BASE) MCG/ACT Inhaler    PROAIR HFA/PROVENTIL HFA/VENTOLIN HFA    1 Inhaler    Inhale 2 puffs into the lungs every 6 hours as needed for shortness of breath / dyspnea or wheezing    Atypical chest pain       amitriptyline 50 MG tablet    ELAVIL    30 tablet    Take 1 tablet (50 mg) by mouth At Bedtime    Abdominal pain, generalized, Insomnia due to medical condition       apremilast 30 MG tablet    OTEZLA     Take 1 tablet (30 mg) by mouth 2 times daily    Behcet's disease (H)       ASPIRIN CHILDRENS 81 MG chewable tablet   Generic drug:  aspirin      Take 81 mg by mouth as needed        aspirin-acetaminophen-caffeine 250-250-65 MG per tablet    EXCEDRIN MIGRAINE     Take 1 tablet by mouth every 6 hours as needed for headaches        benzocaine 20 % Gel    TOPICALE XTRA    30 g    Apply as needed locally to mouth or nasal ulcers for pain; 4 times daily as needed    Behcet's disease (H)       betamethasone valerate 0.1 % cream    VALISONE     Apply topically 2 times daily        * botulinum toxin type A 100 UNITS injection    BOTOX    200 Units    Inject 200 Units into the muscle every 3 months    Cervical dystonia       * BOTOX IJ      Inject 165 Units into the muscle once Lot /C3 Exp 06/2020        carbamide peroxide 6.5 % otic solution    DEBROX     5 drops 2 times daily        clobetasol 0.05 % cream     TEMOVATE    60 g    Apply topically 2 times daily    Folliculitis       colchicine 0.6 MG tablet     270 tablet    TAKE 2 TABLETS BY MOUTH EVERY MORNING AND 1 TABLET EVERY EVENING.    Behcet's disease (H)       COMPOUNDED NON-CONTROLLED SUBSTANCE - PHARMACY TO MIX COMPOUNDED MEDICATION    CMPD RX    30 capsule    Take one capsule in the morning    Fibromyalgia       diclofenac 1 % Gel topical gel    VOLTAREN    100 g    Apply 2-4 grams to affected area(s) up to 4 times per day as needed. This is an anti-inflammatory medication.    Polyarthralgia       dicyclomine 20 MG tablet    BENTYL    120 tablet    Take 1 tablet (20 mg) by mouth 4 times daily as needed    Abdominal cramping       diltiazem 2% in PLO cream (FV COMPOUNDED) 2% Gel     30 g    Apply small pea size amount three times daily to anus until pain is gone.    Anal fissure       diphenhydrAMINE 25 MG tablet    BENADRYL    30 tablet    Take 1 tablet (25 mg) by mouth every 8 hours as needed for allergies        EPINEPHrine 0.3 MG/0.3ML injection 2-pack    EPIPEN 2-EAMON    0.6 mL    Inject 0.3 mLs (0.3 mg) into the muscle as needed for anaphylaxis    Hx of bee sting allergy       guanFACINE 1 MG tablet    TENEX    270 tablet    Take 3 tablets (3 mg) by mouth 2 times daily    Tic       hydrOXYzine 25 MG tablet    ATARAX    90 tablet    Take 1-2 tablets (25-50 mg) by mouth every 6 hours as needed for anxiety    TAHIR (generalized anxiety disorder)       hyoscyamine 0.125 MG tablet    ANASPAZ/LEVSIN    40 tablet    Take 1-2 tablets (125-250 mcg) by mouth every 4 hours as needed for cramping    Abdominal pain, generalized       hypromellose 0.3 % Soln ophthalmic solution    GENTEAL     1 drop every hour as needed for dry eyes        LACTAID PO      Take by mouth daily        lactulose 20 GM/30ML Soln     300 mL    Take 30 mLs by mouth 3 times daily as needed    Constipation, unspecified constipation type       lidocaine 2 % topical gel    XYLOCAINE     Apply 1 Tube  topically daily Reported on 4/21/2017        lidocaine visc 2% 2.5mL/5mL & maalox/mylanta w/ simeth 2.5mL/5mL & diphenhydrAMINE 5mg/5mL Susp suspension    [facility] Mouthwash [facility]    1 Bottle    Swish and swallow 10 mLs in mouth every 6 hours as needed for mouth sores    Behcet's syndrome (H)       linaclotide 145 MCG capsule    LINZESS    90 capsule    Take 1 capsule (145 mcg) by mouth every morning (before breakfast)    Chronic idiopathic constipation       LORazepam 1 MG tablet    ATIVAN    60 tablet    Take 0.5 tablets (0.5 mg) by mouth 2 times daily as needed for other (atypical chest pain) Do not operate a vehicle after taking this medication    Chronic abdominal pain       lubiprostone 24 MCG capsule    AMITIZA    30 capsule    Take 1 capsule (24 mcg) by mouth 2 times daily (with meals)    Chronic idiopathic constipation       omeprazole 40 MG capsule    priLOSEC    90 capsule    Take 1 capsule (40 mg) by mouth daily    Gastroesophageal reflux disease, esophagitis presence not specified       ondansetron 8 MG ODT tab    ZOFRAN-ODT    60 tablet    Take 1 tablet (8 mg) by mouth every 8 hours as needed for nausea    Non-intractable vomiting with nausea, unspecified vomiting type, Hematemesis, presence of nausea not specified       SPRINTEC 28 0.25-35 MG-MCG per tablet   Generic drug:  norgestimate-ethinyl estradiol     84 tablet    TAKE 1 TABLET BY MOUTH ONCE DAILY.    General counseling for prescription of oral contraceptives       sucralfate 1 GM/10ML suspension    CARAFATE    1200 mL    Take 10 mLs (1 g) by mouth 4 times daily    Bile reflux gastritis, Nausea       triamcinolone 0.1 % cream    KENALOG    15 g    Apply sparingly to oral ulcers three times daily for 14 days as needed.    Behcet's syndrome (H)       * Notice:  This list has 2 medication(s) that are the same as other medications prescribed for you. Read the directions carefully, and ask your doctor or other care provider to review them with you.

## 2018-02-27 ENCOUNTER — MEDICAL CORRESPONDENCE (OUTPATIENT)
Dept: HEALTH INFORMATION MANAGEMENT | Facility: CLINIC | Age: 23
End: 2018-02-27

## 2018-02-27 ENCOUNTER — OFFICE VISIT (OUTPATIENT)
Dept: PSYCHIATRY | Facility: CLINIC | Age: 23
End: 2018-02-27
Payer: COMMERCIAL

## 2018-02-27 VITALS
DIASTOLIC BLOOD PRESSURE: 80 MMHG | HEIGHT: 62 IN | BODY MASS INDEX: 27.6 KG/M2 | TEMPERATURE: 98 F | WEIGHT: 150 LBS | OXYGEN SATURATION: 100 % | SYSTOLIC BLOOD PRESSURE: 110 MMHG | HEART RATE: 134 BPM

## 2018-02-27 DIAGNOSIS — F51.5 NIGHTMARES: ICD-10-CM

## 2018-02-27 DIAGNOSIS — F41.1 GAD (GENERALIZED ANXIETY DISORDER): Primary | ICD-10-CM

## 2018-02-27 PROCEDURE — 99214 OFFICE O/P EST MOD 30 MIN: CPT | Performed by: NURSE PRACTITIONER

## 2018-02-27 RX ORDER — CYPROHEPTADINE HYDROCHLORIDE 4 MG/1
4 TABLET ORAL AT BEDTIME
Qty: 30 TABLET | Refills: 2 | Status: SHIPPED | OUTPATIENT
Start: 2018-02-27 | End: 2018-06-01

## 2018-02-27 RX ORDER — HYDROXYZINE HYDROCHLORIDE 25 MG/1
25-50 TABLET, FILM COATED ORAL EVERY 6 HOURS PRN
Qty: 90 TABLET | Refills: 2 | Status: SHIPPED | OUTPATIENT
Start: 2018-02-27 | End: 2018-06-01

## 2018-02-27 ASSESSMENT — ANXIETY QUESTIONNAIRES
6. BECOMING EASILY ANNOYED OR IRRITABLE: MORE THAN HALF THE DAYS
GAD7 TOTAL SCORE: 18
4. TROUBLE RELAXING: NEARLY EVERY DAY
3. WORRYING TOO MUCH ABOUT DIFFERENT THINGS: NEARLY EVERY DAY
2. NOT BEING ABLE TO STOP OR CONTROL WORRYING: MORE THAN HALF THE DAYS
GAD7 TOTAL SCORE: 18
5. BEING SO RESTLESS THAT IT IS HARD TO SIT STILL: NEARLY EVERY DAY
1. FEELING NERVOUS, ANXIOUS, OR ON EDGE: NEARLY EVERY DAY
GAD7 TOTAL SCORE: 18
7. FEELING AFRAID AS IF SOMETHING AWFUL MIGHT HAPPEN: MORE THAN HALF THE DAYS
7. FEELING AFRAID AS IF SOMETHING AWFUL MIGHT HAPPEN: MORE THAN HALF THE DAYS

## 2018-02-27 ASSESSMENT — PATIENT HEALTH QUESTIONNAIRE - PHQ9
10. IF YOU CHECKED OFF ANY PROBLEMS, HOW DIFFICULT HAVE THESE PROBLEMS MADE IT FOR YOU TO DO YOUR WORK, TAKE CARE OF THINGS AT HOME, OR GET ALONG WITH OTHER PEOPLE: VERY DIFFICULT
SUM OF ALL RESPONSES TO PHQ QUESTIONS 1-9: 12
SUM OF ALL RESPONSES TO PHQ QUESTIONS 1-9: 12

## 2018-02-27 NOTE — PROGRESS NOTES
"      Outpatient Psychiatric Progress Note    Name: Samara Oropeza   : 1995                    Primary Care Provider: Sonja Abreu, APRN, CNP - last visit 2017  Therapist: Mike Browne - last visit 2017 discharged  [facility]  Upcoming sleep and cardiology appointments    PHQ-9 scores:  PHQ-9 SCORE 10/9/2017 2017 2018   Total Score 17 12 12       TAHIR-7 scores:  TAHIR-7 SCORE 10/9/2017 2017 2018   Total Score 19 10 18     Answers for HPI/ROS submitted by the patient on 2018   If you checked off any problems, how difficult have these problems made it for you to do your work, take care of things at home, or get along with other people?: Very difficult    Patient Identification:  Patient is a 22 year old year old, partnered / significant other  Two or more races American female  who presents for return visit with me.  Patient is currently unemployed. Patient attended the session with significant other , who they agreed to have interview with. Patient prefers to be called: \"Samara\".    Interim History:    I last saw Samara Oropeza for outpatient psychiatry Return Visit on 2017.     During that appointment, we Start Minipress (prazosin) 1 mg by mouth daily at bedtime for nightmares. May consider dose increase if needed.     Continue Vistaril/Atarax (hydroxyzine) 25- 50 mg by mouth up to 4 times daily for anxiety.    Continue Intuniv (guanfacine) 3 mg two times per day per primary care provider for Tourette's Syndrome.    Continue Elavil (amitriptyline) 50 mg by mouth daily at bedtime for sleep and pain per pain specialist.    Continue Ativan (lorazepam) per pain specialist.      Current medications include:   Current Outpatient Prescriptions   Medication Sig     lubiprostone (AMITIZA) 24 MCG capsule Take 1 capsule (24 mcg) by mouth 2 times daily (with meals)     benzocaine (TOPICALE XTRA) 20 % GEL Apply " as needed locally to mouth or nasal ulcers for pain; 4 times daily as needed     colchicine 0.6 MG tablet TAKE 2 TABLETS BY MOUTH EVERY MORNING AND 1 TABLET EVERY EVENING.     apremilast (OTEZLA) 30 MG tablet Take 1 tablet (30 mg) by mouth 2 times daily     linaclotide (LINZESS) 145 MCG capsule Take 1 capsule (145 mcg) by mouth every morning (before breakfast)     dicyclomine (BENTYL) 20 MG tablet Take 1 tablet (20 mg) by mouth 4 times daily as needed     aspirin-acetaminophen-caffeine (EXCEDRIN MIGRAINE) 250-250-65 MG per tablet Take 1 tablet by mouth every 6 hours as needed for headaches     SPRINTEC 28 0.25-35 MG-MCG per tablet TAKE 1 TABLET BY MOUTH ONCE DAILY.     hydrOXYzine (ATARAX) 25 MG tablet Take 1-2 tablets (25-50 mg) by mouth every 6 hours as needed for anxiety     guanFACINE (TENEX) 1 MG tablet Take 3 tablets (3 mg) by mouth 2 times daily     diltiazem 2% in PLO cream, FV COMPOUNDED, 2% GEL Apply small pea size amount three times daily to anus until pain is gone.     amitriptyline (ELAVIL) 50 MG tablet Take 1 tablet (50 mg) by mouth At Bedtime     lactulose 20 GM/30ML SOLN Take 30 mLs by mouth 3 times daily as needed     diphenhydrAMINE (BENADRYL) 25 MG tablet Take 1 tablet (25 mg) by mouth every 8 hours as needed for allergies     sucralfate (CARAFATE) 1 GM/10ML suspension Take 10 mLs (1 g) by mouth 4 times daily     diclofenac (VOLTAREN) 1 % GEL topical gel Apply 2-4 grams to affected area(s) up to 4 times per day as needed. This is an anti-inflammatory medication.     aspirin (ASPIRIN CHILDRENS) 81 MG chewable tablet Take 81 mg by mouth as needed      omeprazole (PRILOSEC) 40 MG capsule Take 1 capsule (40 mg) by mouth daily     albuterol (PROAIR HFA/PROVENTIL HFA/VENTOLIN HFA) 108 (90 BASE) MCG/ACT Inhaler Inhale 2 puffs into the lungs every 6 hours as needed for shortness of breath / dyspnea or wheezing     clobetasol (TEMOVATE) 0.05 % cream Apply topically 2 times daily     botulinum toxin type A  (BOTOX) 100 UNITS injection Inject 200 Units into the muscle every 3 months     hyoscyamine (ANASPAZ,LEVSIN) 0.125 MG tablet Take 1-2 tablets (125-250 mcg) by mouth every 4 hours as needed for cramping     hypromellose (GENTEAL) 0.3 % SOLN 1 drop every hour as needed for dry eyes      betamethasone valerate (VALISONE) 0.1 % cream Apply topically 2 times daily     Lactase (LACTAID PO) Take by mouth daily     lidocaine (XYLOCAINE) 2 % jelly Apply 1 Tube topically daily Reported on 4/21/2017     triamcinolone (KENALOG) 0.1 % cream Apply sparingly to oral ulcers three times daily for 14 days as needed.     OnabotulinumtoxinA (BOTOX IJ) Inject 165 Units into the muscle once Lot /C3  Exp 06/2020     COMPOUNDED NON-CONTROLLED SUBSTANCE (CMPD RX) - PHARMACY TO MIX COMPOUNDED MEDICATION Take one capsule in the morning     LORazepam (ATIVAN) 1 MG tablet Take 0.5 tablets (0.5 mg) by mouth 2 times daily as needed for other (atypical chest pain) Do not operate a vehicle after taking this medication     ondansetron (ZOFRAN-ODT) 8 MG ODT tab Take 1 tablet (8 mg) by mouth every 8 hours as needed for nausea     EPINEPHrine (EPIPEN 2-EAMON) 0.3 MG/0.3ML injection Inject 0.3 mLs (0.3 mg) into the muscle as needed for anaphylaxis     Magic Mouthwash (FV std formula) lidocaine visc 2% 2.5mL/5mL & maalox/mylanta w/ simeth 2.5mL/5mL & diphenhydrAMINE 5mg/5mL Swish and swallow 10 mLs in mouth every 6 hours as needed for mouth sores     carbamide peroxide (DEBROX) 6.5 % otic solution 5 drops 2 times daily     No current facility-administered medications for this visit.        The [place] Prescription Monitoring Program has been reviewed and there are no concerns about diversionary activity for controlled substances at this time.  Ativan (lorazepam) 1 mg 60 tabs filled July 2017 and October 11, 2017. Most recent fill from [facility] [place].     I was able to review most recent Primary Care Provider, specialty provider,  "and therapy visit notes that I have access to.   Last visit, Minipress (prazosin) was the only medication changed or added.   Patient reports this made her nightmares worse.   Patient then stopped the medication.  There was no contact with our clinic about this.   This was discontinued from patient's medication list on 2/10/2018 during an urgent care visit.     Patient reports that she has had many specialist appointments since last visit.   Patient is no longer working as there was conflict with her employer.   Patient needs to follow with cardiology and sleep specialist.     Samara Oropeza reports mood has been: \"irritated more easily\" no hypomania or urdy  Anxiety has been: \"pretty bad\" no panic. Pelvic floor therapy started and this causes a lot of stress. Patient reports that she has used the Ativan (lorazepam) very sparingly- has not used in last one month. Patient plans to switch to [facility] GI specialist. Patient reports this increases her anxiety and she took a Ativan (lorazepam) and this did not work.   Has used the Vistaril/Atarax (hydroxyzine) - takes this 2-3 times per day still for anxiety.   Sleep has been: \"hard to fall and stay asleep\" sleeping about 3 hours at a time. Patient reports that she is \"falling asleep in middle of conversations during the day\". Patient reports that this started in January of 2018. Nightmares continue most nights.   Tics have been \"very bad\"- patient reports that she is injuring herself by burning hand- no burn noted today. Patient also reports that she cuts herself sometimes because she cannot tell when her tics come on.   PHQ9 and GAD7 scores were reviewed today. Limited improvements in symptoms.   Medication side effects: Denies  Current stressors include: Occupational Difficulties, Medical Difficulties, Financial Difficulties, Recent Moving, Family Relationships, Insurance coverage changes  Coping mechanisms and supports include: Animal Rescue, Deep " "Breathing, Journaling    Previous medication trials include but not limited to:  Vistaril/Atarax (hydroxyzine)  Intuniv (guanfacine)  Elavil (amitriptyline)   Ativan (lorazepam)  Buspar (buspirone)   Neurontin (gabapentin)   Melatonin   Minipress (prazosin)     Past Medical History:   Diagnosis Date     Anxiety      Arthritis      Behcet's disease (H)      Cervical adenitis May 2010     Chronic abdominal pain      Constipation, chronic 1994     Gastro-oesophageal reflux disease      Gastroparesis      Migraines      Neuromuscular disorder (H)     fibramyalgia     Palpitations      Seizure (H)      Seizures (H)     unknown etiology     Syncope      Tourette's       has a past medical history of Anxiety; Arthritis; Behcet's disease (H); Cervical adenitis (May 2010); Chronic abdominal pain; Constipation, chronic (1994); Gastro-oesophageal reflux disease; Gastroparesis; Migraines; Neuromuscular disorder (H); Palpitations; Seizure (H); Seizures (H); Syncope; and Tourette's.    Social History:  Current Living situation:  [place], [place]. Feels safe at home.  Current use of drugs or alcohol: Alcohol recent binge drinking episode of 10 shots and got sick, ambulance was called, so not drinking alcohol anymore  Tobacco use: No  Caffeine: Yes - 2 products per day  Recovery Programming Involvement: Not Applicable    Vital Signs:   /80  Pulse 134  Temp 98  F (36.7  C) (Oral)  Ht 5' 2.25\" (1.581 m)  Wt 150 lb (68 kg)  LMP 01/20/2018  SpO2 100%  BMI 27.22 kg/m2    Labs:  Most recent laboratory results reviewed and the pertinent results include:   Orders Only on 01/17/2018   Component Date Value Ref Range Status     WBC 01/17/2018 5.8  4.0 - 11.0 10e9/L Final     RBC Count 01/17/2018 4.29  3.8 - 5.2 10e12/L Final     Hemoglobin 01/17/2018 11.9  11.7 - 15.7 g/dL Final     Hematocrit 01/17/2018 37.7  35.0 - 47.0 % Final     MCV 01/17/2018 88  78 - 100 fl Final     MCH 01/17/2018 27.7  26.5 - 33.0 pg Final     MCHC 01/17/2018 " 31.6  31.5 - 36.5 g/dL Final     RDW 01/17/2018 13.7  10.0 - 15.0 % Final     Platelet Count 01/17/2018 243  150 - 450 10e9/L Final     Diff Method 01/17/2018 Automated Method   Final     % Neutrophils 01/17/2018 46.7  % Final     % Lymphocytes 01/17/2018 44.5  % Final     % Monocytes 01/17/2018 5.9  % Final     % Eosinophils 01/17/2018 2.4  % Final     % Basophils 01/17/2018 0.3  % Final     % Immature Granulocytes 01/17/2018 0.2  % Final     Nucleated RBCs 01/17/2018 0  0 /100 Final     Absolute Neutrophil 01/17/2018 2.7  1.6 - 8.3 10e9/L Final     Absolute Lymphocytes 01/17/2018 2.6  0.8 - 5.3 10e9/L Final     Absolute Monocytes 01/17/2018 0.3  0.0 - 1.3 10e9/L Final     Absolute Eosinophils 01/17/2018 0.1  0.0 - 0.7 10e9/L Final     Absolute Basophils 01/17/2018 0.0  0.0 - 0.2 10e9/L Final     Abs Immature Granulocytes 01/17/2018 0.0  0 - 0.4 10e9/L Final     Absolute Nucleated RBC 01/17/2018 0.0   Final     Iron 01/17/2018 52  35 - 180 ug/dL Final     Iron Binding Cap 01/17/2018 587* 240 - 430 ug/dL Final     Iron Saturation Index 01/17/2018 9* 15 - 46 % Final     Ferritin 01/17/2018 4* 12 - 150 ng/mL Final     Tissue Transglutaminase Antibody I* 01/17/2018 <1  <7 U/mL Final    Comment: Negative  The tTG-IgA assay has limited utility for patients with decreased levels of   IgA. Screening for celiac disease should include IgA testing to rule out   selective IgA deficiency and to guide selection and interpretation of   serological testing. tTG-IgG testing may be positive in celiac disease   patients with IgA deficiency.       Tissue Transglutaminase Marlee IgG 01/17/2018 <1  <7 U/mL Final    Negative     IGA 01/17/2018 97  70 - 380 mg/dL Final        Review of Systems:  10 systems (general, cardiovascular, respiratory, eyes, ENT, endocrine, GI, , M/S, neurological) were reviewed. Most pertinent finding(s) is/are: chronic abdominal pain, migraines- has ongoing Botox injections,  ongoing nausea. The remaining  "systems are all unremarkable.      Mental Status Examination:  Appearance:  awake, alert, adequately groomed, appeared stated age, wears makeup, on time  Attitude:  cooperative   Eye Contact:  adequate  Gait and Station: Normal, No assistive Devices used and No dizziness or falls  Psychomotor Behavior:  no evidence of tardive dyskinesia, dystonia, or tics  Oriented to:  time, person, and place  Attention Span and Concentration:  Normal  Speech:  clear, coherent, regular rate, regular rhythm and fluent  Mood:   \"irritable\"  Affect: calm, blunted  Associations:  no loose associations  Thought Process:  logical, linear and goal oriented  Thought Content:  no evidence of suicidal ideation or homicidal ideation, no evidence of psychotic thought and Appropriate to Interview- focused on physical stuff  Recent and Remote Memory:  Intact to interview. Not formally assessed. No amnesia.  Fund of Knowledge: appropriate  Insight:  fair  Judgment:  intact- adequate for safety  Impulse Control:  intact      Suicide Risk Assessment:  Today Samara Oropeza reports many psychosocial stressors, anxiety, and mood lability. Ongoing psychosocial stress and medical comorbidities. No suicidal ideation and Patient denies depression.  In addition, there are notable risk factors for self-harm, including age, anxiety, family history and comorbid medical condition of chronic pain. However, risk is mitigated by commitment to family, absence of past attempts, ability to volunteer a safety plan, history of seeking help when needed, future oriented, denies suicidal intent or plan and denies homicidal ideation, intent, or plan. Therefore, based on all available evidence including the factors cited above, Samara Oropeza does not appear to be at imminent risk for self-harm, does not meet criteria for a 72-hr hold, and therefore remains appropriate for ongoing outpatient level of care.  A thorough assessment of risk factors related to suicide " and self-harm have been reviewed and are noted above. Local community safety resources reviewed and printed for patient to use if needed. There was no deceit detected, and the patient presented in a manner that was believable.       DSM5  Diagnosis:  296.32 (F33.1) Major Depressive Disorder, Recurrent Episode, Moderate With anxious distress  300.02 (F41.1) Generalized Anxiety Disorder  Post-traumatic stress disorder (PTSD)   Somatic Symptom Disorder    Medical comorbidities include:   Patient Active Problem List    Diagnosis Date Noted     Spells of decreased attentiveness 12/19/2017     Priority: Medium     Mobile cecum 11/09/2017     Priority: Medium     Cecum noted in Right lower quadrant on 4/17 CT scan, and in Left upper Quadrant on CT on 11/2017.       Vitamin D deficiency 10/11/2017     Priority: Medium     How low, unknown/not found. On D when tested at 27. Starting cholecalciferol October 2017. Needs recheck.       Convulsions, unspecified convulsion type (H) 10/03/2017     Priority: Medium     Transient alteration of awareness 10/03/2017     Priority: Medium     Chronic pain syndrome 07/27/2017     Priority: Medium     Major depressive disorder, recurrent episode, moderate (H) 06/27/2017     Priority: Medium     Cervical pain 05/02/2017     Priority: Medium     Acute left ankle pain 03/31/2017     Priority: Medium     Cervical dystonia 03/28/2017     Priority: Medium     PTSD (post-traumatic stress disorder) 01/17/2017     Priority: Medium     Patellofemoral instability 10/20/2016     Priority: Medium     Fibromyalgia 08/04/2016     Priority: Medium     Rheumatoid arthritis of multiple sites without rheumatoid factor (H) 08/04/2016     Priority: Medium     Raynaud's disease without gangrene 08/04/2016     Priority: Medium     Chronic abdominal pain 08/04/2016     Priority: Medium     Palpitations 01/12/2016     Priority: Medium     On colchicine therapy 10/30/2015     Priority: Medium     Spell of shaking  05/06/2015     Priority: Medium     Migraine 02/04/2015     Priority: Medium     Problem list name updated by automated process. Provider to review       Behcet's disease (H) 12/10/2014     Priority: Medium     Headaches due to old head injury 11/12/2013     Priority: Medium     Milk protein intolerance 10/11/2013     Priority: Medium     Concussion 02/13/2013     Priority: Medium     Jan 2013, with prolonged recovery- followed by sports med         Knee pain 01/03/2013     Priority: Medium     TAHIR (generalized anxiety disorder) 06/25/2009     Priority: Medium     Tics - Tourette syndrome 05/18/2009     Priority: Medium     Followed by psychotherapy. Symptoms well managed. Originally diagnosed at [facility]. (Dr. Simpson)           IBS (irritable bowel syndrome) 05/18/2009     Priority: Medium     Seasonal allergic rhinitis 05/18/2009     Priority: Medium       Psychosocial & Contextual Factors:  Medical Comorbidities, Occupational Difficulties, Financial Difficulties    Assessment:  Samara Oropeza reports her biggest concern is nightmares and anxiety. Medication side effects and alternatives were reviewed. Health promotion activities recommended and reviewed today. All questions addressed. Education and counseling completed regarding risks and benefits of medications and psychotherapy options.    Minipress (prazosin) was stopped by patient as she reports it made her nightmares worse?  Patient also reports her tics are worse lately due to many stressors.    Continue to monitor nightmares and may augment with cyproheptadine or other sleeping medication such as Desyrel/Olepto (trazodone) or Remeron (mirtazapine). Cymbalta (duloxetine) may also be an option for anxiety, depression, and chronic pain.     Recommend a Controlled Substance Agreement for all controlled medications with subsequent providers.    Patient continues to have limited improvement in symptoms of depression and anxiety. Patient is only  taking low dose of Elavil (amitriptyline) for pain. She has not been active in therapy recently due to poor attendance. Discussed her options as this is short term care as part of the [facility] Service model in [place].  Will do one more visit and either refer for long term psychiatry care or return to Primary Care Provider.     Treatment Plan:    Start cyproheptadine 4 mg by mouth daily at bedtime for nightmares.    Continue Vistaril/Atarax (hydroxyzine) 25 - 50 mg by mouth up to 4 times daily for anxiety.    Continue Intuniv (guanfacine) 3 mg two times per day per primary care provider for Tourette's Syndrome.    Continue Elavil (amitriptyline) 50 mg by mouth daily at bedtime for sleep and pain per pain specialist.    Continue Ativan (lorazepam) per pain specialist.     Continue all other medications as reviewed per electronic medical record today.     Safety plan reviewed. To the Emergency Department as needed or call after hours crisis line at [phone] or [phone].     To schedule individual or family therapy, call [facility] at [phone].    Schedule an appointment with me in 8-12 weeks or sooner as needed. Call [facility] at [phone] to schedule.    Follow up with primary care provider as planned or for acute medical concerns.    Call the psychiatric nurse line with medication questions or concerns at [phone].    MyChart may be used to communicate with your provider, but this is not intended to be used for emergencies.    Administrative Billing:   Time spent with patient and significant other was 30 minutes and greater than 50% of time or 20 minutes was spent in counseling and coordination of care regarding above diagnoses and treatment plan.    Patient Status:  Patient will continue to be seen for ongoing consultation and stabilization.    Signed:   Cata Hurst, PhD, APRN, CNP   Psychiatry

## 2018-02-27 NOTE — PATIENT INSTRUCTIONS
Treatment Plan:    Start cyproheptadine 4 mg by mouth daily at bedtime for nightmares.    Continue Vistaril/Atarax (hydroxyzine) 25 - 50 mg by mouth up to 4 times daily for anxiety.    Continue Intuniv (guanfacine) 3 mg two times per day per primary care provider for Tourette's Syndrome.    Continue Elavil (amitriptyline) 50 mg by mouth daily at bedtime for sleep and pain per pain specialist.    Continue Ativan (lorazepam) per pain specialist.     Continue all other medications as reviewed per electronic medical record today.     Safety plan reviewed. To the Emergency Department as needed or call after hours crisis line at [phone] or [phone].     To schedule individual or family therapy, call [facility] at [phone].    Schedule an appointment with me in 8-12 weeks or sooner as needed. Call [facility] at [phone] to schedule.    Follow up with primary care provider as planned or for acute medical concerns.    Call the psychiatric nurse line with medication questions or concerns at [phone].    [email or web address] may be used to communicate with your provider, but this is not intended to be used for emergencies.

## 2018-02-27 NOTE — NURSING NOTE
"  Chief Complaint   Patient presents with     RECHECK       Initial /80  Pulse 134  Temp 98  F (36.7  C) (Oral)  Ht 5' 2.25\" (1.581 m)  Wt 150 lb (68 kg)  LMP 01/20/2018  SpO2 100%  BMI 27.22 kg/m2 Estimated body mass index is 27.22 kg/(m^2) as calculated from the following:    Height as of this encounter: 5' 2.25\" (1.581 m).    Weight as of this encounter: 150 lb (68 kg).  Medication Reconciliation: complete    "

## 2018-02-27 NOTE — MR AVS SNAPSHOT
After Visit Summary   2/27/2018    Samara Oropeza    MRN: [id number]           Patient Information     Date Of Birth          1995        Visit Information        Provider Department      2/27/2018 10:15 AM Cata Hurst NP [facility]        Today's Diagnoses     TAHIR (generalized anxiety disorder)    -  1    Nightmares          Care Instructions    Treatment Plan:    Start cyproheptadine 4 mg by mouth daily at bedtime for nightmares.    Continue Vistaril/Atarax (hydroxyzine) 25 - 50 mg by mouth up to 4 times daily for anxiety.    Continue Intuniv (guanfacine) 3 mg two times per day per primary care provider for Tourette's Syndrome.    Continue Elavil (amitriptyline) 50 mg by mouth daily at bedtime for sleep and pain per pain specialist.    Continue Ativan (lorazepam) per pain specialist.     Continue all other medications as reviewed per electronic medical record today.     Safety plan reviewed. To the Emergency Department as needed or call after hours crisis line at [phone] or [phone].     To schedule individual or family therapy, call [facility] at [phone].    Schedule an appointment with me in 8-12 weeks or sooner as needed. Call [facility] at [phone] to schedule.    Follow up with primary care provider as planned or for acute medical concerns.    Call the psychiatric nurse line with medication questions or concerns at [phone].    MyChart may be used to communicate with your provider, but this is not intended to be used for emergencies.          Follow-ups after your visit        Your next 10 appointments already scheduled     Feb 28, 2018  3:00 PM CST   (Arrive by 2:45 PM)   Return Botox with Frederick Bender MD   [facility] ([facility])    [address] [place] [address] [phone]            Apr 04, 2018  1:40 PM  "CDT   (Arrive by 1:25 PM)   Return Visit with Estela Cuenca MD   [facility] ([facility])    [address]  [address]  [facility] [address] [phone]            Apr 17, 2018 11:00 AM CDT   Return Visit with Austen Marquez MD   [facility]    [address] [address] [phone]              Who to contact     If you have questions or need follow up information about today's clinic visit or your schedule please contact [facility] directly at [phone].  Normal or non-critical lab and imaging results will be communicated to you by MyChart, letter or phone within 4 business days after the clinic has received the results. If you do not hear from us within 7 days, please contact the clinic through MyChart or phone. If you have a critical or abnormal lab result, we will notify you by phone as soon as possible.  Submit refill requests through [facility] or call your pharmacy and they will forward the refill request to us. Please allow 3 business days for your refill to be completed.          Additional Information About Your Visit        [employer] Information     [facility] gives you secure access to your electronic health record. If you see a primary care provider, you can also send messages to your care team and make appointments. If you have questions, please call your primary care clinic.  If you do not have a primary care provider, please call [phone] and they will assist you.        Care EveryWhere ID     This is your Care EveryWhere ID. This could be used by other organizations to access your [facility] medical records  [id number]        Your Vitals Were     Pulse Temperature Height Last Period Pulse Oximetry BMI (Body Mass Index)    134 98  F (36.7  C) (Oral) 5' 2.25\" (1.581 m) 01/20/2018 100% 27.22 kg/m2       Blood Pressure " from Last 3 Encounters:   02/27/18 110/80   02/13/18 117/63   02/10/18 102/62    Weight from Last 3 Encounters:   02/27/18 150 lb (68 kg)   02/13/18 153 lb 1.6 oz (69.4 kg)   02/10/18 149 lb (67.6 kg)              Today, you had the following     No orders found for display         Today's Medication Changes          These changes are accurate as of 2/27/18 10:58 AM.  If you have any questions, ask your nurse or doctor.               Start taking these medicines.        Dose/Directions    cyproheptadine 4 MG tablet   Commonly known as:  PERIACTIN   Used for:  Nightmares   Started by:  Cata Hurst NP        Dose:  4 mg   Take 1 tablet (4 mg) by mouth At Bedtime For nightmares   Quantity:  30 tablet   Refills:  2            Where to get your medicines      These medications were sent to [facility] [address], [place] [id number]     Phone:  [phone]     cyproheptadine 4 MG tablet    hydrOXYzine 25 MG tablet                Primary Care Provider Office Phone # Fax #    Sonja Annabella Abreu, APRN [facility] [phone]       [address]  [facility] [address]        Equal Access to Services     NABIL GALEANO AH: Brandon brady hadasho Soomaali, waaxda luqadaha, qaybta kaalmada adeegyada, mike sanchesin hayarlet parsons. So [facility] [phone].    ATENCIÓN: Si habla español, tiene a livingston disposición servicios gratuitos de asistencia lingüística. Michaelame al [phone].    We comply with applicable federal civil rights laws and [place] laws. We do not discriminate on the basis of race, color, national origin, age, disability, sex, sexual orientation, or gender identity.            Thank you!     Thank you for choosing [facility]  for your care. Our goal is always to provide you with excellent care. Hearing back from our patients is one way we can continue to improve our services. Please take a  few minutes to complete the written survey that you may receive in the mail after your visit with us. Thank you!             Your Updated Medication List - Protect others around you: Learn how to safely use, store and throw away your medicines at www.disposemymeds.org.          This list is accurate as of 2/27/18 10:58 AM.  Always use your most recent med list.                   Brand Name Dispense Instructions for use Diagnosis    albuterol 108 (90 BASE) MCG/ACT Inhaler    PROAIR HFA/PROVENTIL HFA/VENTOLIN HFA    1 Inhaler    Inhale 2 puffs into the lungs every 6 hours as needed for shortness of breath / dyspnea or wheezing    Atypical chest pain       amitriptyline 50 MG tablet    ELAVIL    30 tablet    Take 1 tablet (50 mg) by mouth At Bedtime    Abdominal pain, generalized, Insomnia due to medical condition       apremilast 30 MG tablet    OTEZLA     Take 1 tablet (30 mg) by mouth 2 times daily    Behcet's disease (H)       ASPIRIN CHILDRENS 81 MG chewable tablet   Generic drug:  aspirin      Take 81 mg by mouth as needed        aspirin-acetaminophen-caffeine 250-250-65 MG per tablet    EXCEDRIN MIGRAINE     Take 1 tablet by mouth every 6 hours as needed for headaches        benzocaine 20 % Gel    TOPICALE XTRA    30 g    Apply as needed locally to mouth or nasal ulcers for pain; 4 times daily as needed    Behcet's disease (H)       betamethasone valerate 0.1 % cream    VALISONE     Apply topically 2 times daily        * botulinum toxin type A 100 UNITS injection    BOTOX    200 Units    Inject 200 Units into the muscle every 3 months    Cervical dystonia       * BOTOX IJ      Inject 165 Units into the muscle once Lot /C3 Exp 06/2020        carbamide peroxide 6.5 % otic solution    DEBROX     5 drops 2 times daily        clobetasol 0.05 % cream    TEMOVATE    60 g    Apply topically 2 times daily    Folliculitis       colchicine 0.6 MG tablet     270 tablet    TAKE 2 TABLETS BY MOUTH EVERY MORNING AND 1  TABLET EVERY EVENING.    Behcet's disease (H)       COMPOUNDED NON-CONTROLLED SUBSTANCE - PHARMACY TO MIX COMPOUNDED MEDICATION    CMPD RX    30 capsule    Take one capsule in the morning    Fibromyalgia       cyproheptadine 4 MG tablet    PERIACTIN    30 tablet    Take 1 tablet (4 mg) by mouth At Bedtime For nightmares    Nightmares       diclofenac 1 % Gel topical gel    VOLTAREN    100 g    Apply 2-4 grams to affected area(s) up to 4 times per day as needed. This is an anti-inflammatory medication.    Polyarthralgia       dicyclomine 20 MG tablet    BENTYL    120 tablet    Take 1 tablet (20 mg) by mouth 4 times daily as needed    Abdominal cramping       diltiazem 2% in PLO cream (FV COMPOUNDED) 2% Gel     30 g    Apply small pea size amount three times daily to anus until pain is gone.    Anal fissure       diphenhydrAMINE 25 MG tablet    BENADRYL    30 tablet    Take 1 tablet (25 mg) by mouth every 8 hours as needed for allergies        EPINEPHrine 0.3 MG/0.3ML injection 2-pack    EPIPEN 2-EAMON    0.6 mL    Inject 0.3 mLs (0.3 mg) into the muscle as needed for anaphylaxis    Hx of bee sting allergy       guanFACINE 1 MG tablet    TENEX    270 tablet    Take 3 tablets (3 mg) by mouth 2 times daily    Tic       hydrOXYzine 25 MG tablet    ATARAX    90 tablet    Take 1-2 tablets (25-50 mg) by mouth every 6 hours as needed for anxiety    TAHIR (generalized anxiety disorder)       hyoscyamine 0.125 MG tablet    ANASPAZ/LEVSIN    40 tablet    Take 1-2 tablets (125-250 mcg) by mouth every 4 hours as needed for cramping    Abdominal pain, generalized       hypromellose 0.3 % Soln ophthalmic solution    GENTEAL     1 drop every hour as needed for dry eyes        LACTAID PO      Take by mouth daily        lactulose 20 GM/30ML Soln     300 mL    Take 30 mLs by mouth 3 times daily as needed    Constipation, unspecified constipation type       lidocaine 2 % topical gel    XYLOCAINE     Apply 1 Tube topically daily Reported on  4/21/2017        lidocaine visc 2% 2.5mL/5mL & maalox/mylanta w/ simeth 2.5mL/5mL & diphenhydrAMINE 5mg/5mL Susp suspension    [facility] Mouthwash [facility]    1 Bottle    Swish and swallow 10 mLs in mouth every 6 hours as needed for mouth sores    Behcet's syndrome (H)       linaclotide 145 MCG capsule    LINZESS    90 capsule    Take 1 capsule (145 mcg) by mouth every morning (before breakfast)    Chronic idiopathic constipation       LORazepam 1 MG tablet    ATIVAN    60 tablet    Take 0.5 tablets (0.5 mg) by mouth 2 times daily as needed for other (atypical chest pain) Do not operate a vehicle after taking this medication    Chronic abdominal pain       lubiprostone 24 MCG capsule    AMITIZA    30 capsule    Take 1 capsule (24 mcg) by mouth 2 times daily (with meals)    Chronic idiopathic constipation       omeprazole 40 MG capsule    priLOSEC    90 capsule    Take 1 capsule (40 mg) by mouth daily    Gastroesophageal reflux disease, esophagitis presence not specified       ondansetron 8 MG ODT tab    ZOFRAN-ODT    60 tablet    Take 1 tablet (8 mg) by mouth every 8 hours as needed for nausea    Non-intractable vomiting with nausea, unspecified vomiting type, Hematemesis, presence of nausea not specified       SPRINTEC 28 0.25-35 MG-MCG per tablet   Generic drug:  norgestimate-ethinyl estradiol     84 tablet    TAKE 1 TABLET BY MOUTH ONCE DAILY.    General counseling for prescription of oral contraceptives       sucralfate 1 GM/10ML suspension    CARAFATE    1200 mL    Take 10 mLs (1 g) by mouth 4 times daily    Bile reflux gastritis, Nausea       triamcinolone 0.1 % cream    KENALOG    15 g    Apply sparingly to oral ulcers three times daily for 14 days as needed.    Behcet's syndrome (H)       * Notice:  This list has 2 medication(s) that are the same as other medications prescribed for you. Read the directions carefully, and ask your doctor or other care provider to review them with you.

## 2018-02-28 ENCOUNTER — CARE COORDINATION (OUTPATIENT)
Dept: GASTROENTEROLOGY | Facility: CLINIC | Age: 23
End: 2018-02-28

## 2018-02-28 ENCOUNTER — OFFICE VISIT (OUTPATIENT)
Dept: PHYSICAL MEDICINE AND REHAB | Facility: CLINIC | Age: 23
End: 2018-02-28
Payer: COMMERCIAL

## 2018-02-28 VITALS
BODY MASS INDEX: 27.6 KG/M2 | HEART RATE: 95 BPM | WEIGHT: 150 LBS | SYSTOLIC BLOOD PRESSURE: 128 MMHG | HEIGHT: 62 IN | DIASTOLIC BLOOD PRESSURE: 86 MMHG

## 2018-02-28 DIAGNOSIS — G43.719 INTRACTABLE CHRONIC MIGRAINE WITHOUT AURA AND WITHOUT STATUS MIGRAINOSUS: Primary | ICD-10-CM

## 2018-02-28 ASSESSMENT — PATIENT HEALTH QUESTIONNAIRE - PHQ9: SUM OF ALL RESPONSES TO PHQ QUESTIONS 1-9: 12

## 2018-02-28 ASSESSMENT — ANXIETY QUESTIONNAIRES: GAD7 TOTAL SCORE: 18

## 2018-02-28 ASSESSMENT — PAIN SCALES - GENERAL: PAINLEVEL: MODERATE PAIN (5)

## 2018-02-28 NOTE — LETTER
2/28/2018       RE: Samara Oropeza  [address]   [place] [address]     Dear Colleague,    Thank you for referring your patient, Samara Oropeza, to the [facility]. Please see a copy of my visit note below.    BOTULINUM TOXIN PROCEDURE - HEADACHE - NOTE    Chief Complaint   Patient presents with     RECHECK     [facility]- Botox injection       There were no vitals taken for this visit.       Current Outpatient Prescriptions:      hydrOXYzine (ATARAX) 25 MG tablet, Take 1-2 tablets (25-50 mg) by mouth every 6 hours as needed for anxiety, Disp: 90 tablet, Rfl: 2     cyproheptadine (PERIACTIN) 4 MG tablet, Take 1 tablet (4 mg) by mouth At Bedtime For nightmares, Disp: 30 tablet, Rfl: 2     lubiprostone (AMITIZA) 24 MCG capsule, Take 1 capsule (24 mcg) by mouth 2 times daily (with meals), Disp: 30 capsule, Rfl: 1     benzocaine (TOPICALE XTRA) 20 % GEL, Apply as needed locally to mouth or nasal ulcers for pain; 4 times daily as needed, Disp: 30 g, Rfl: 1     colchicine 0.6 MG tablet, TAKE 2 TABLETS BY MOUTH EVERY MORNING AND 1 TABLET EVERY EVENING., Disp: 270 tablet, Rfl: 0     apremilast (OTEZLA) 30 MG tablet, Take 1 tablet (30 mg) by mouth 2 times daily, Disp: , Rfl:      linaclotide (LINZESS) 145 MCG capsule, Take 1 capsule (145 mcg) by mouth every morning (before breakfast), Disp: 90 capsule, Rfl: 1     dicyclomine (BENTYL) 20 MG tablet, Take 1 tablet (20 mg) by mouth 4 times daily as needed, Disp: 120 tablet, Rfl: 3     aspirin-acetaminophen-caffeine (EXCEDRIN MIGRAINE) 250-250-65 MG per tablet, Take 1 tablet by mouth every 6 hours as needed for headaches, Disp: , Rfl:      SPRINTEC 28 0.25-35 MG-MCG per tablet, TAKE 1 TABLET BY MOUTH ONCE DAILY., Disp: 84 tablet, Rfl: 2     OnabotulinumtoxinA (BOTOX IJ), Inject 165 Units into the muscle once Lot /C3 Exp 06/2020, Disp: , Rfl:      guanFACINE (TENEX) 1 MG  tablet, Take 3 tablets (3 mg) by mouth 2 times daily, Disp: 270 tablet, Rfl: 3     diltiazem 2% in PLO cream, FV COMPOUNDED, 2% GEL, Apply small pea size amount three times daily to anus until pain is gone., Disp: 30 g, Rfl: 1     COMPOUNDED NON-CONTROLLED SUBSTANCE (CMPD RX) - PHARMACY TO MIX COMPOUNDED MEDICATION, Take one capsule in the morning, Disp: 30 capsule, Rfl: 0     amitriptyline (ELAVIL) 50 MG tablet, Take 1 tablet (50 mg) by mouth At Bedtime, Disp: 30 tablet, Rfl: 11     lactulose 20 GM/30ML SOLN, Take 30 mLs by mouth 3 times daily as needed, Disp: 300 mL, Rfl: 3     LORazepam (ATIVAN) 1 MG tablet, Take 0.5 tablets (0.5 mg) by mouth 2 times daily as needed for other (atypical chest pain) Do not operate a vehicle after taking this medication, Disp: 60 tablet, Rfl: 0     ondansetron (ZOFRAN-ODT) 8 MG ODT tab, Take 1 tablet (8 mg) by mouth every 8 hours as needed for nausea, Disp: 60 tablet, Rfl: 6     diphenhydrAMINE (BENADRYL) 25 MG tablet, Take 1 tablet (25 mg) by mouth every 8 hours as needed for allergies, Disp: 30 tablet, Rfl: 0     sucralfate (CARAFATE) 1 GM/10ML suspension, Take 10 mLs (1 g) by mouth 4 times daily, Disp: 1200 mL, Rfl: 2     diclofenac (VOLTAREN) 1 % GEL topical gel, Apply 2-4 grams to affected area(s) up to 4 times per day as needed. This is an anti-inflammatory medication., Disp: 100 g, Rfl: 4     aspirin (ASPIRIN CHILDRENS) 81 MG chewable tablet, Take 81 mg by mouth as needed , Disp: , Rfl:      omeprazole (PRILOSEC) 40 MG capsule, Take 1 capsule (40 mg) by mouth daily, Disp: 90 capsule, Rfl: 3     EPINEPHrine (EPIPEN 2-EAMON) 0.3 MG/0.3ML injection, Inject 0.3 mLs (0.3 mg) into the muscle as needed for anaphylaxis, Disp: 0.6 mL, Rfl: 3     albuterol (PROAIR HFA/PROVENTIL HFA/VENTOLIN HFA) 108 (90 BASE) MCG/ACT Inhaler, Inhale 2 puffs into the lungs every 6 hours as needed for shortness of breath / dyspnea or wheezing, Disp: 1 Inhaler, Rfl: 1     Magic Mouthwash (FV std formula)  lidocaine visc 2% 2.5mL/5mL & maalox/mylanta w/ simeth 2.5mL/5mL & diphenhydrAMINE 5mg/5mL, Swish and swallow 10 mLs in mouth every 6 hours as needed for mouth sores, Disp: 1 Bottle, Rfl: 1     clobetasol (TEMOVATE) 0.05 % cream, Apply topically 2 times daily, Disp: 60 g, Rfl: 0     botulinum toxin type A (BOTOX) 100 UNITS injection, Inject 200 Units into the muscle every 3 months, Disp: 200 Units, Rfl: 3     hyoscyamine (ANASPAZ,LEVSIN) 0.125 MG tablet, Take 1-2 tablets (125-250 mcg) by mouth every 4 hours as needed for cramping, Disp: 40 tablet, Rfl: 1     hypromellose (GENTEAL) 0.3 % SOLN, 1 drop every hour as needed for dry eyes , Disp: , Rfl:      betamethasone valerate (VALISONE) 0.1 % cream, Apply topically 2 times daily, Disp: , Rfl:      carbamide peroxide (DEBROX) 6.5 % otic solution, 5 drops 2 times daily, Disp: , Rfl:      Lactase (LACTAID PO), Take by mouth daily, Disp: , Rfl:      lidocaine (XYLOCAINE) 2 % jelly, Apply 1 Tube topically daily Reported on 4/21/2017, Disp: , Rfl:      triamcinolone (KENALOG) 0.1 % cream, Apply sparingly to oral ulcers three times daily for 14 days as needed., Disp: 15 g, Rfl: 1     Allergies   Allergen Reactions     Amoxil [Penicillins] Rash     Dad unsure of reaction.     Bee Venom Anaphylaxis     Contrast Dye Rash     Contrast Media Ready-[facility], 04/09/2014.; Contrast [facility], 04/09/2014.  NOTE: this is a contrast media oral with iodine. Premedicate with methylpred standard for IV contrast, request barium contrast for oral contrast.     Kiwi Swelling     Orange Fruit [Citrus] Anaphylaxis     Pineapple Anaphylaxis, Difficulty breathing and Rash     Reglan [Metoclopramide] Other (See Comments)     IV dose only, in ER, rapid heart rate.     Ace Inhibitors      Difficulty in breathing and GI upset     Amitiza [Lubiprostone] Nausea and Vomiting     Amoxicillin-Pot Clavulanate      Latex      Midazolam Unknown     Other reaction(s): Unknown  parent states that  when pt takes this medication, she wakes up being very violent .  parent states that when pt takes this medication, she wakes up being very violent .     No Clinical Screening - See Comments      Bleech/ chest tightness, itchy throat, swollen tongue, hives     Tomatoes [Tomato]      Versed      Coming out of pelvic exam at age of 5, was kicking and screaming when coming out of the versed.     Adhesive Tape Rash     Azithromycin Hives and Rash     Cephalexin Itching and Rash     Itchy mouth  Itchy mouth     Keflex [Cephalexin-Fd&C Yellow #6] Hives        PHYSICAL EXAM:    Currently on day 3 of a multi-day migraine.  No facial asymmetry    HPI:    Patient reports the following new medical problems since last visit: statest hat she was in the hospital for 5 days because of seizures. States that she is being scheduled for stomach surgery. States that she has a mobile cecum. States that she has also started pelvic floor therapy at [facility]. States that her seizures is being caused by a heart complication and she is going to follow up with her cardiologist on Saturday. States that she also saw psychiatrist yesterday.  Her psychiatrist statse that she was placed on a new medication for nightmares. States that she had her hydroxyzine increased.     We reviewed the recommended safety guidelines for  Botox from any vaccine injection, such as the seasonal flu vaccine, by a minimum of 10-14 days with Samara Oropeza. She acknowledged understanding.    RESPONSE TO PREVIOUS TREATMENT:  Change in headache pattern following last series of injections with 165 units of  Botox on 12/6/2017.    Post-procedural headache: Rated as 'Mild' severity.  Duration: 1 weeks .    1.  Headache frequency during this injection cycle: 2  headache days per month.  This is compared to her baseline headache frequency of 20 headache days per month.     2.  Headache duration during this injection cycle:  Headache duration ranged from 1.5 hours  to 3 days. Patient reports 2 episodes of multiple day headaches during this injection cycle.     3.  Headache intensity during this injection cycle:    A.  5-7/10  =  Typical pain level.  B.  9/10  =  Worst pain level.  C.  3/10  =  Lowest pain level.    4.  Change in headache medication usage during this injection cycle:  No changes. States that she stopped taking excedrin because of stomach side effects.    5.  ER Visits During This Injection Cycle:  4 times but none of these were related to her migraines.    6.  Functional Performance:  Change in ADL's, social interaction, days lost from work, etc. States that hse has been able to attend family functions but ends up by herself in dark room. States that this is a significant improvmenet from prior to receiving these injections.      BOTULINUM NEUROTOXIN INJECTION PROCEDURES:      VERIFICATION OF PATIENT IDENTIFICATION AND PROCEDURE     Initials   Patient Name lcm   Patient  lcm   Procedure Verified by: larissa     Prior to the start of the procedure and with procedural staff participation, I verbally confirmed the patient s identity using two indicators, relevant allergies, that the procedure was appropriate and matched the consent or emergent situation, and that the correct equipment/implants were available. Immediately prior to starting the procedure I conducted the Time Out with the procedural staff and re-confirmed the patient s name, procedure, and site/side. (The Joint Commission universal protocol was followed.)  Yes    Sedation (Moderate or Deep): None    Above assessments performed by:  Resident/Fellow         Jason Aguilar          The attending provider was present for the entire procedure documented below.      Frederick Bender MD      INDICATIONS FOR PROCEDURES:  Samara Oropeza is a 22 year old patient with chronic migraine headaches associated with cervicogenic  components.     Her baseline symptoms have been recalcitrant to oral medications and  conservative therapy.  She is here today for reinjection with Botox.    GOAL OF PROCEDURE:  The goal of this procedure is to increase active range of motion, improve volitional motor control, decrease pain  and enhance functional independence.    TOTAL DOSE ADMINISTERED:  Dose Administered:  165 units  Botox (Botulinum Toxin Type A)       2:1 Dilution     Diluent Used:  0.25% Sensorcaine U [id number]; 10/2019  Total Volume of Diluent Used:  3.3  ml  Lot # /C3 with Expiration Date:  10/2020  NDC #: Botox 100u ([address]-98)    Medication guide was offered to patient and was declined.    CONSENT:  The risks, benefits, and treatment options were discussed with Samara Oropeza and she agreed to proceed.    Written consent was obtained by [facility].     EQUIPMENT USED:  Needle-37mm stimulating/recording  Needle-30 gauge  EMG/NCS Machine    SKIN PREPARATION:  Skin preparation was performed using an alcohol wipe.    GUIDANCE DESCRIPTION:  Electro-myographic guidance was necessary throughout the procedure to accurately identify all areas of spastic muscles while avoiding injection of non-spastic muscles, neighboring nerves and nearby vascular structures.     AREA/MUSCLE INJECTED:  165 units of Botox dil 2:1 NS and 0.25% Sensorcaine     1 & 2. SHOULDER GIRDLE & NECK MUSCLES: 20 units Botox = Total Dose, 2:1 Dilution   Right Splenius - 5 units of Botox at 1 site/s.   Left Splenius - 5 units of Botox at 1 site/s.      Right Levator Scapulae - 5 units of Botox at 1 site/s (shoulder muscles).   Left Levator Scapulae - 5 units of Botox at 1 site/s (shoulder muscles).     3. HEAD & SCALP MUSCLES: 145 units Botox = Total Dose, 2:1 Dilution  Right Occipitalis - 10 units of Botox at 3 site/s.   Left Occipitalis - 10 units of Botox at 3 site/s.     Right Frontalis - 15 units of Botox at 4 site/s.  Left Frontalis - 15 units of Botox at 4 site/s.     Right Temporalis - 40 units of Botox at 8 site/s.  Left Temporalis - 40 units of  Botox at 8 site/s.     Right  - 5 units of Botox at 1 site/s.                        Left  - 5 units of Botox at 1 site/s.     Procerus - 5 units of Botox at 1 site/s.    RESPONSE TO PROCEDURE:  Samara Oropeza tolerated the procedure well and there were no immediate complications.   She was allowed to recover for an appropriate period of time and was discharged home in stable condition.    FOLLOW UP:  Samara Oropeza was asked to follow up by phone in 7-14 days with Marcelle Richardson PT, Care Coordinator or Vidya Dickinson RN, Care Coordinator, to report her response to this series of injections.  Based on the patient's previous response to this therapy, Samara Oropeza was rescheduled for the next series of injections in 12 weeks.    PLAN (Medication Changes, Therapy Orders, Work or Disability Issues, etc.): Patient will continue to monitor response to today's injections.     There were 35 units of Botox as unavoidable waste for this patient.    Again, thank you for allowing me to participate in the care of your patient.      Sincerely,    Frederick Bender MD

## 2018-02-28 NOTE — PROGRESS NOTES
BOTULINUM TOXIN PROCEDURE - HEADACHE - NOTE    Chief Complaint   Patient presents with     RECHECK     [facility]- Botox injection       There were no vitals taken for this visit.       Current Outpatient Prescriptions:      hydrOXYzine (ATARAX) 25 MG tablet, Take 1-2 tablets (25-50 mg) by mouth every 6 hours as needed for anxiety, Disp: 90 tablet, Rfl: 2     cyproheptadine (PERIACTIN) 4 MG tablet, Take 1 tablet (4 mg) by mouth At Bedtime For nightmares, Disp: 30 tablet, Rfl: 2     lubiprostone (AMITIZA) 24 MCG capsule, Take 1 capsule (24 mcg) by mouth 2 times daily (with meals), Disp: 30 capsule, Rfl: 1     benzocaine (TOPICALE XTRA) 20 % GEL, Apply as needed locally to mouth or nasal ulcers for pain; 4 times daily as needed, Disp: 30 g, Rfl: 1     colchicine 0.6 MG tablet, TAKE 2 TABLETS BY MOUTH EVERY MORNING AND 1 TABLET EVERY EVENING., Disp: 270 tablet, Rfl: 0     apremilast (OTEZLA) 30 MG tablet, Take 1 tablet (30 mg) by mouth 2 times daily, Disp: , Rfl:      linaclotide (LINZESS) 145 MCG capsule, Take 1 capsule (145 mcg) by mouth every morning (before breakfast), Disp: 90 capsule, Rfl: 1     dicyclomine (BENTYL) 20 MG tablet, Take 1 tablet (20 mg) by mouth 4 times daily as needed, Disp: 120 tablet, Rfl: 3     aspirin-acetaminophen-caffeine (EXCEDRIN MIGRAINE) 250-250-65 MG per tablet, Take 1 tablet by mouth every 6 hours as needed for headaches, Disp: , Rfl:      SPRINTEC 28 0.25-35 MG-MCG per tablet, TAKE 1 TABLET BY MOUTH ONCE DAILY., Disp: 84 tablet, Rfl: 2     OnabotulinumtoxinA (BOTOX IJ), Inject 165 Units into the muscle once Lot /C3 Exp 06/2020, Disp: , Rfl:      guanFACINE (TENEX) 1 MG tablet, Take 3 tablets (3 mg) by mouth 2 times daily, Disp: 270 tablet, Rfl: 3     diltiazem 2% in PLO cream, FV COMPOUNDED, 2% GEL, Apply small pea size amount three times daily to anus until pain is gone., Disp: 30 g, Rfl: 1     COMPOUNDED NON-CONTROLLED SUBSTANCE (CMPD RX) - PHARMACY TO MIX COMPOUNDED MEDICATION, Take  one capsule in the morning, Disp: 30 capsule, Rfl: 0     amitriptyline (ELAVIL) 50 MG tablet, Take 1 tablet (50 mg) by mouth At Bedtime, Disp: 30 tablet, Rfl: 11     lactulose 20 GM/30ML SOLN, Take 30 mLs by mouth 3 times daily as needed, Disp: 300 mL, Rfl: 3     LORazepam (ATIVAN) 1 MG tablet, Take 0.5 tablets (0.5 mg) by mouth 2 times daily as needed for other (atypical chest pain) Do not operate a vehicle after taking this medication, Disp: 60 tablet, Rfl: 0     ondansetron (ZOFRAN-ODT) 8 MG ODT tab, Take 1 tablet (8 mg) by mouth every 8 hours as needed for nausea, Disp: 60 tablet, Rfl: 6     diphenhydrAMINE (BENADRYL) 25 MG tablet, Take 1 tablet (25 mg) by mouth every 8 hours as needed for allergies, Disp: 30 tablet, Rfl: 0     sucralfate (CARAFATE) 1 GM/10ML suspension, Take 10 mLs (1 g) by mouth 4 times daily, Disp: 1200 mL, Rfl: 2     diclofenac (VOLTAREN) 1 % GEL topical gel, Apply 2-4 grams to affected area(s) up to 4 times per day as needed. This is an anti-inflammatory medication., Disp: 100 g, Rfl: 4     aspirin (ASPIRIN CHILDRENS) 81 MG chewable tablet, Take 81 mg by mouth as needed , Disp: , Rfl:      omeprazole (PRILOSEC) 40 MG capsule, Take 1 capsule (40 mg) by mouth daily, Disp: 90 capsule, Rfl: 3     EPINEPHrine (EPIPEN 2-EAMON) 0.3 MG/0.3ML injection, Inject 0.3 mLs (0.3 mg) into the muscle as needed for anaphylaxis, Disp: 0.6 mL, Rfl: 3     albuterol (PROAIR HFA/PROVENTIL HFA/VENTOLIN HFA) 108 (90 BASE) MCG/ACT Inhaler, Inhale 2 puffs into the lungs every 6 hours as needed for shortness of breath / dyspnea or wheezing, Disp: 1 Inhaler, Rfl: 1     Magic Mouthwash (FV std formula) lidocaine visc 2% 2.5mL/5mL & maalox/mylanta w/ simeth 2.5mL/5mL & diphenhydrAMINE 5mg/5mL, Swish and swallow 10 mLs in mouth every 6 hours as needed for mouth sores, Disp: 1 Bottle, Rfl: 1     clobetasol (TEMOVATE) 0.05 % cream, Apply topically 2 times daily, Disp: 60 g, Rfl: 0     botulinum toxin type A (BOTOX) 100 UNITS  injection, Inject 200 Units into the muscle every 3 months, Disp: 200 Units, Rfl: 3     hyoscyamine (ANASPAZ,LEVSIN) 0.125 MG tablet, Take 1-2 tablets (125-250 mcg) by mouth every 4 hours as needed for cramping, Disp: 40 tablet, Rfl: 1     hypromellose (GENTEAL) 0.3 % SOLN, 1 drop every hour as needed for dry eyes , Disp: , Rfl:      betamethasone valerate (VALISONE) 0.1 % cream, Apply topically 2 times daily, Disp: , Rfl:      carbamide peroxide (DEBROX) 6.5 % otic solution, 5 drops 2 times daily, Disp: , Rfl:      Lactase (LACTAID PO), Take by mouth daily, Disp: , Rfl:      lidocaine (XYLOCAINE) 2 % jelly, Apply 1 Tube topically daily Reported on 4/21/2017, Disp: , Rfl:      triamcinolone (KENALOG) 0.1 % cream, Apply sparingly to oral ulcers three times daily for 14 days as needed., Disp: 15 g, Rfl: 1     Allergies   Allergen Reactions     Amoxil [Penicillins] Rash     Dad unsure of reaction.     Bee Venom Anaphylaxis     Contrast Dye Rash     Contrast Media [facility], 04/09/2014.; Contrast [facility], 04/09/2014.  NOTE: this is a contrast media oral with iodine. Premedicate with methylpred standard for IV contrast, request barium contrast for oral contrast.     Kiwi Swelling     Orange Fruit [Citrus] Anaphylaxis     Pineapple Anaphylaxis, Difficulty breathing and Rash     Reglan [Metoclopramide] Other (See Comments)     IV dose only, in ER, rapid heart rate.     Ace Inhibitors      Difficulty in breathing and GI upset     Amitiza [Lubiprostone] Nausea and Vomiting     Amoxicillin-Pot Clavulanate      Latex      Midazolam Unknown     Other reaction(s): Unknown  parent states that when pt takes this medication, she wakes up being very violent .  parent states that when pt takes this medication, she wakes up being very violent .     No Clinical Screening - See Comments      Bleech/ chest tightness, itchy throat, swollen tongue, hives     Tomatoes [Tomato]      Versed      Coming out of pelvic exam at  age of 5, was kicking and screaming when coming out of the versed.     Adhesive Tape Rash     Azithromycin Hives and Rash     Cephalexin Itching and Rash     Itchy mouth  Itchy mouth     Keflex [Cephalexin-Fd&C Yellow #6] Hives        PHYSICAL EXAM:    Currently on day 3 of a multi-day migraine.  No facial asymmetry    HPI:    Patient reports the following new medical problems since last visit: statest hat she was in the hospital for 5 days because of seizures. States that she is being scheduled for stomach surgery. States that she has a mobile cecum. States that she has also started pelvic floor therapy at [facility]. States that her seizures is being caused by a heart complication and she is going to follow up with her cardiologist on Saturday. States that she also saw psychiatrist yesterday.  Her psychiatrist statse that she was placed on a new medication for nightmares. States that she had her hydroxyzine increased.     We reviewed the recommended safety guidelines for  Botox from any vaccine injection, such as the seasonal flu vaccine, by a minimum of 10-14 days with Samara Oropeza. She acknowledged understanding.    RESPONSE TO PREVIOUS TREATMENT:  Change in headache pattern following last series of injections with 165 units of  Botox on 12/6/2017.    Post-procedural headache: Rated as 'Mild' severity.  Duration: 1 weeks .    1.  Headache frequency during this injection cycle: 2  headache days per month.  This is compared to her baseline headache frequency of 20 headache days per month.     2.  Headache duration during this injection cycle:  Headache duration ranged from 1.5 hours to 3 days. Patient reports 2 episodes of multiple day headaches during this injection cycle.     3.  Headache intensity during this injection cycle:    A.  5-7/10  =  Typical pain level.  B.  9/10  =  Worst pain level.  C.  3/10  =  Lowest pain level.    4.  Change in headache medication usage during this injection cycle:   No changes. States that she stopped taking excedrin because of stomach side effects.    5.  ER Visits During This Injection Cycle:  4 times but none of these were related to her migraines.    6.  Functional Performance:  Change in ADL's, social interaction, days lost from work, etc. States that hshedy has been able to attend family functions but ends up by herself in dark room. States that this is a significant improvmenet from prior to receiving these injections.      BOTULINUM NEUROTOXIN INJECTION PROCEDURES:      VERIFICATION OF PATIENT IDENTIFICATION AND PROCEDURE     Initials   Patient Name lcm   Patient  lcm   Procedure Verified by: larissa     Prior to the start of the procedure and with procedural staff participation, I verbally confirmed the patient s identity using two indicators, relevant allergies, that the procedure was appropriate and matched the consent or emergent situation, and that the correct equipment/implants were available. Immediately prior to starting the procedure I conducted the Time Out with the procedural staff and re-confirmed the patient s name, procedure, and site/side. (The Joint Commission universal protocol was followed.)  Yes    Sedation (Moderate or Deep): None    Above assessments performed by:  Resident/Fellow         Jason Aguilar          The attending provider was present for the entire procedure documented below.      Frederick Bender MD      INDICATIONS FOR PROCEDURES:  Samara Oropeza is a 22 year old patient with chronic migraine headaches associated with cervicogenic  components.     Her baseline symptoms have been recalcitrant to oral medications and conservative therapy.  She is here today for reinjection with Botox.    GOAL OF PROCEDURE:  The goal of this procedure is to increase active range of motion, improve volitional motor control, decrease pain  and enhance functional independence.    TOTAL DOSE ADMINISTERED:  Dose Administered:  165 units  Botox (Botulinum Toxin  Type A)       2:1 Dilution     Diluent Used:  0.25% Sensorcaine U 542016; 10/2019  Total Volume of Diluent Used:  3.3  ml  Lot # /C3 with Expiration Date:  10/2020  NDC #: Botox 100u ([address]-81)    Medication guide was offered to patient and was declined.    CONSENT:  The risks, benefits, and treatment options were discussed with Samara Oropeza and she agreed to proceed.    Written consent was obtained by lcm.     EQUIPMENT USED:  Needle-37mm stimulating/recording  Needle-30 gauge  EMG/NCS Machine    SKIN PREPARATION:  Skin preparation was performed using an alcohol wipe.    GUIDANCE DESCRIPTION:  Electro-myographic guidance was necessary throughout the procedure to accurately identify all areas of spastic muscles while avoiding injection of non-spastic muscles, neighboring nerves and nearby vascular structures.     AREA/MUSCLE INJECTED:  165 units of Botox dil 2:1 NS and 0.25% Sensorcaine     1 & 2. SHOULDER GIRDLE & NECK MUSCLES: 20 units Botox = Total Dose, 2:1 Dilution   Right Splenius - 5 units of Botox at 1 site/s.   Left Splenius - 5 units of Botox at 1 site/s.      Right Levator Scapulae - 5 units of Botox at 1 site/s (shoulder muscles).   Left Levator Scapulae - 5 units of Botox at 1 site/s (shoulder muscles).     3. HEAD & SCALP MUSCLES: 145 units Botox = Total Dose, 2:1 Dilution  Right Occipitalis - 10 units of Botox at 3 site/s.   Left Occipitalis - 10 units of Botox at 3 site/s.     Right Frontalis - 15 units of Botox at 4 site/s.  Left Frontalis - 15 units of Botox at 4 site/s.     Right Temporalis - 40 units of Botox at 8 site/s.  Left Temporalis - 40 units of Botox at 8 site/s.     Right  - 5 units of Botox at 1 site/s.                        Left  - 5 units of Botox at 1 site/s.     Procerus - 5 units of Botox at 1 site/s.    RESPONSE TO PROCEDURE:  Samara Oropeza tolerated the procedure well and there were no immediate complications.   She was allowed to  recover for an appropriate period of time and was discharged home in stable condition.    FOLLOW UP:  Samara Oropeza was asked to follow up by phone in 7-14 days with Marcelle Richardson PT, Care Coordinator or Vidya Dickinson RN, Care Coordinator, to report her response to this series of injections.  Based on the patient's previous response to this therapy, Samara Oropeza was rescheduled for the next series of injections in 12 weeks.    PLAN (Medication Changes, Therapy Orders, Work or Disability Issues, etc.): Patient will continue to monitor response to today's injections.     There were 35 units of Botox as unavoidable waste for this patient.

## 2018-02-28 NOTE — MR AVS SNAPSHOT
After Visit Summary   2/28/2018    Samara Oropeza    MRN: [id number]           Patient Information     Date Of Birth          1995        Visit Information        Provider Department      2/28/2018 3:00 PM Frederick Bender MD [facility]         Follow-ups after your visit        Follow-up notes from your care team     Return in about 12 weeks (around 5/23/2018) for Chronic Migraine.      Your next 10 appointments already scheduled     Mar 03, 2018 11:00 AM CST   (Arrive by 10:45 AM)   New Patient Visit with HUSSEIN Lynn MD   [facility] ([facility])    [address]  [address]  [place] [id number] [phone]            Mar 08, 2018  1:00 PM CST   New Sleep Patient with Kimo Gaston PsyD   [facility] ([facility])    [address]  [facility] [id number] [phone]            Apr 04, 2018  1:40 PM CDT   (Arrive by 1:25 PM)   Return Visit with Estela Cuenca MD   [facility] ([facility])    [address]  4th [address]  [place] [place] [address] [phone]            Apr 17, 2018 11:00 AM CDT   Return Visit with Austen Marquez MD   [facility]    [address] [id number] [phone]            May 01, 2018 10:45 AM CDT   Return Visit with Cata Hurst NP   [facility] ([facility])    [address]  [address]  [facility] [id number] [phone]            May 29, 2018  3:00 PM CDT   (Arrive by 2:45 PM)   Return Botox with Frederick Bender MD   [facility] ([facility])    [address]  3rd Floor  [place] [address]  "  [phone]            Aug 21, 2018 11:00 AM CDT   (Arrive by 10:45 AM)   Return Botox with Frederick Bender MD   [facility] ([facility])    [address] [place] [id number] [phone]              Who to contact     Please call your clinic at [phone] to:    Ask questions about your health    Make or cancel appointments    Discuss your medicines    Learn about your test results    Speak to your doctor            Additional Information About Your Visit        [facility] Information     [facility] gives you secure access to your electronic health record. If you see a primary care provider, you can also send messages to your care team and make appointments. If you have questions, please call your primary care clinic.  If you do not have a primary care provider, please call [phone] and they will assist you.      [facility] is an electronic gateway that provides easy, online access to your medical records. With [facility], you can request a clinic appointment, read your test results, renew a prescription or communicate with your care team.     To access your existing account, please contact your [facility] or call [phone] for assistance.        Care EveryWhere ID     This is your Care EveryWhere ID. This could be used by other organizations to access your [place] medical records  [id number]        Your Vitals Were     Pulse Height BMI (Body Mass Index)             95 1.581 m (5' 2.25\") 27.22 kg/m2          Blood Pressure from Last 3 Encounters:   02/28/18 128/86   02/27/18 110/80   02/13/18 117/63    Weight from Last 3 Encounters:   02/28/18 68 kg (150 lb)   02/27/18 68 kg (150 lb)   02/13/18 69.4 kg (153 lb 1.6 oz)              Today, you had the following     No orders found for display       Primary Care Provider Office Phone # Fax #    EVI Cardona [phone] " [phone]       [address] [facility] [address]  [place] [address]        Equal Access to Services     DELISAFRANK FROYLAN : Hadii aad ku modestao Sotiffany, waaxda luqadaha, qaybta kaalmada yessica, mike verónicain hayaan tmidavid crane laRobertarlet parsons. So [facility] [phone].    ATENCIÓN: Si habla español, tiene a livingston disposición servicios gratuitos de asistencia lingüística. Llame al [phone].    We comply with applicable federal civil rights laws and [place] laws. We do not discriminate on the basis of race, color, national origin, age, disability, sex, sexual orientation, or gender identity.            Thank you!     Thank you for choosing [facility]  for your care. Our goal is always to provide you with excellent care. Hearing back from our patients is one way we can continue to improve our services. Please take a few minutes to complete the written survey that you may receive in the mail after your visit with us. Thank you!             Your Updated Medication List - Protect others around you: Learn how to safely use, store and throw away your medicines at www.disposemymeds.org.          This list is accurate as of 2/28/18  3:34 PM.  Always use your most recent med list.                   Brand Name Dispense Instructions for use Diagnosis    albuterol 108 (90 BASE) MCG/ACT Inhaler    PROAIR HFA/PROVENTIL HFA/VENTOLIN HFA    1 Inhaler    Inhale 2 puffs into the lungs every 6 hours as needed for shortness of breath / dyspnea or wheezing    Atypical chest pain       amitriptyline 50 MG tablet    ELAVIL    30 tablet    Take 1 tablet (50 mg) by mouth At Bedtime    Abdominal pain, generalized, Insomnia due to medical condition       apremilast 30 MG tablet    OTEZLA     Take 1 tablet (30 mg) by mouth 2 times daily    Behcet's disease (H)       ASPIRIN CHILDRENS 81 MG chewable tablet   Generic drug:  aspirin      Take 81 mg by mouth as needed        aspirin-acetaminophen-caffeine 250-250-65 MG per  tablet    EXCEDRIN MIGRAINE     Take 1 tablet by mouth every 6 hours as needed for headaches        benzocaine 20 % Gel    TOPICALE XTRA    30 g    Apply as needed locally to mouth or nasal ulcers for pain; 4 times daily as needed    Behcet's disease (H)       betamethasone valerate 0.1 % cream    VALISONE     Apply topically 2 times daily        * BOTOX IJ      Inject 165 Units as directed once Lot#: /C3 Exp: 10/2020        * botulinum toxin type A 100 UNITS injection    BOTOX    200 Units    Inject 200 Units into the muscle every 3 months    Cervical dystonia       * BOTOX IJ      Inject 165 Units into the muscle once Lot /C3 Exp 06/2020        carbamide peroxide 6.5 % otic solution    DEBROX     5 drops 2 times daily        clobetasol 0.05 % cream    TEMOVATE    60 g    Apply topically 2 times daily    Folliculitis       colchicine 0.6 MG tablet     270 tablet    TAKE 2 TABLETS BY MOUTH EVERY MORNING AND 1 TABLET EVERY EVENING.    Behcet's disease (H)       COMPOUNDED NON-CONTROLLED SUBSTANCE - PHARMACY TO MIX COMPOUNDED MEDICATION    CMPD RX    30 capsule    Take one capsule in the morning    Fibromyalgia       cyproheptadine 4 MG tablet    PERIACTIN    30 tablet    Take 1 tablet (4 mg) by mouth At Bedtime For nightmares    Nightmares       diclofenac 1 % Gel topical gel    VOLTAREN    100 g    Apply 2-4 grams to affected area(s) up to 4 times per day as needed. This is an anti-inflammatory medication.    Polyarthralgia       dicyclomine 20 MG tablet    BENTYL    120 tablet    Take 1 tablet (20 mg) by mouth 4 times daily as needed    Abdominal cramping       diltiazem 2% in PLO cream (FV COMPOUNDED) 2% Gel     30 g    Apply small pea size amount three times daily to anus until pain is gone.    Anal fissure       diphenhydrAMINE 25 MG tablet    BENADRYL    30 tablet    Take 1 tablet (25 mg) by mouth every 8 hours as needed for allergies        EPINEPHrine 0.3 MG/0.3ML injection 2-pack    EPIPEN 2-EAMON     0.6 mL    Inject 0.3 mLs (0.3 mg) into the muscle as needed for anaphylaxis    Hx of bee sting allergy       guanFACINE 1 MG tablet    TENEX    270 tablet    Take 3 tablets (3 mg) by mouth 2 times daily    Tic       hydrOXYzine 25 MG tablet    ATARAX    90 tablet    Take 1-2 tablets (25-50 mg) by mouth every 6 hours as needed for anxiety    TAHIR (generalized anxiety disorder)       hyoscyamine 0.125 MG tablet    ANASPAZ/LEVSIN    40 tablet    Take 1-2 tablets (125-250 mcg) by mouth every 4 hours as needed for cramping    Abdominal pain, generalized       hypromellose 0.3 % Soln ophthalmic solution    GENTEAL     1 drop every hour as needed for dry eyes        LACTAID PO      Take by mouth daily        lactulose 20 GM/30ML Soln     300 mL    Take 30 mLs by mouth 3 times daily as needed    Constipation, unspecified constipation type       lidocaine 2 % topical gel    XYLOCAINE     Apply 1 Tube topically daily Reported on 4/21/2017        lidocaine visc 2% 2.5mL/5mL & maalox/mylanta w/ simeth 2.5mL/5mL & diphenhydrAMINE 5mg/5mL Susp suspension    [facility]    1 Bottle    Swish and swallow 10 mLs in mouth every 6 hours as needed for mouth sores    Behcet's syndrome (H)       linaclotide 145 MCG capsule    LINZESS    90 capsule    Take 1 capsule (145 mcg) by mouth every morning (before breakfast)    Chronic idiopathic constipation       LORazepam 1 MG tablet    ATIVAN    60 tablet    Take 0.5 tablets (0.5 mg) by mouth 2 times daily as needed for other (atypical chest pain) Do not operate a vehicle after taking this medication    Chronic abdominal pain       lubiprostone 24 MCG capsule    AMITIZA    30 capsule    Take 1 capsule (24 mcg) by mouth 2 times daily (with meals)    Chronic idiopathic constipation       omeprazole 40 MG capsule    priLOSEC    90 capsule    Take 1 capsule (40 mg) by mouth daily    Gastroesophageal reflux disease, esophagitis presence not specified       ondansetron 8 MG ODT tab     ZOFRAN-ODT    60 tablet    Take 1 tablet (8 mg) by mouth every 8 hours as needed for nausea    Non-intractable vomiting with nausea, unspecified vomiting type, Hematemesis, presence of nausea not specified       SPRINTEC 28 0.25-35 MG-MCG per tablet   Generic drug:  norgestimate-ethinyl estradiol     84 tablet    TAKE 1 TABLET BY MOUTH ONCE DAILY.    General counseling for prescription of oral contraceptives       sucralfate 1 GM/10ML suspension    CARAFATE    1200 mL    Take 10 mLs (1 g) by mouth 4 times daily    Bile reflux gastritis, Nausea       triamcinolone 0.1 % cream    KENALOG    15 g    Apply sparingly to oral ulcers three times daily for 14 days as needed.    Behcet's syndrome (H)       * Notice:  This list has 3 medication(s) that are the same as other medications prescribed for you. Read the directions carefully, and ask your doctor or other care provider to review them with you.

## 2018-03-01 ASSESSMENT — ENCOUNTER SYMPTOMS
EYE IRRITATION: 1
FATIGUE: 1
DEPRESSION: 0
EXERCISE INTOLERANCE: 1
PALPITATIONS: 1
SINUS CONGESTION: 1
STIFFNESS: 1
INCREASED ENERGY: 1
EYE WATERING: 1
TROUBLE SWALLOWING: 1
INSOMNIA: 1
VOMITING: 1
NECK MASS: 0
LIGHT-HEADEDNESS: 1
RECTAL PAIN: 0
FEVER: 1
JAUNDICE: 0
NUMBNESS: 1
MUSCLE WEAKNESS: 1
SKIN CHANGES: 0
TASTE DISTURBANCE: 1
COUGH DISTURBING SLEEP: 0
SINUS PAIN: 1
SPUTUM PRODUCTION: 0
NAUSEA: 1
ABDOMINAL PAIN: 1
SYNCOPE: 1
DIZZINESS: 1
MUSCLE CRAMPS: 1
NIGHT SWEATS: 1
HEARTBURN: 1
PANIC: 0
EYE PAIN: 1
LOSS OF CONSCIOUSNESS: 1
SHORTNESS OF BREATH: 1
HYPERTENSION: 0
CHILLS: 1
WEAKNESS: 1
ORTHOPNEA: 1
DECREASED CONCENTRATION: 1
CONSTIPATION: 1
POOR WOUND HEALING: 0
NAIL CHANGES: 1
MYALGIAS: 1
NERVOUS/ANXIOUS: 1
COUGH: 0
DISTURBANCES IN COORDINATION: 1
PARALYSIS: 1
DYSPNEA ON EXERTION: 0
HYPOTENSION: 1
BRUISES/BLEEDS EASILY: 1
WEIGHT LOSS: 1
SPEECH CHANGE: 1
POLYDIPSIA: 1
HOARSE VOICE: 1
HEADACHES: 1
LEG PAIN: 1
POLYPHAGIA: 0
SNORES LOUDLY: 0
BACK PAIN: 1
DOUBLE VISION: 1
SORE THROAT: 1
TREMORS: 1
TINGLING: 1
BLOOD IN STOOL: 0
WHEEZING: 0
NECK PAIN: 1
ARTHRALGIAS: 1
SMELL DISTURBANCE: 1
SWOLLEN GLANDS: 0
WEIGHT GAIN: 1
SLEEP DISTURBANCES DUE TO BREATHING: 1
BOWEL INCONTINENCE: 0
MEMORY LOSS: 1
ALTERED TEMPERATURE REGULATION: 1
SEIZURES: 0
HEMOPTYSIS: 0
BLOATING: 1
DECREASED APPETITE: 1
JOINT SWELLING: 1
EYE REDNESS: 1
DIARRHEA: 1
HALLUCINATIONS: 0
POSTURAL DYSPNEA: 1

## 2018-03-03 ENCOUNTER — TRANSFERRED RECORDS (OUTPATIENT)
Dept: HEALTH INFORMATION MANAGEMENT | Facility: CLINIC | Age: 23
End: 2018-03-03

## 2018-03-03 ENCOUNTER — OFFICE VISIT (OUTPATIENT)
Dept: CARDIOLOGY | Facility: CLINIC | Age: 23
End: 2018-03-03
Attending: INTERNAL MEDICINE
Payer: COMMERCIAL

## 2018-03-03 VITALS
SYSTOLIC BLOOD PRESSURE: 119 MMHG | HEART RATE: 88 BPM | DIASTOLIC BLOOD PRESSURE: 84 MMHG | WEIGHT: 150 LBS | HEIGHT: 62 IN | OXYGEN SATURATION: 100 % | BODY MASS INDEX: 27.6 KG/M2

## 2018-03-03 DIAGNOSIS — R55 SYNCOPE AND COLLAPSE: ICD-10-CM

## 2018-03-03 DIAGNOSIS — R55 VASOVAGAL SYNCOPE: Primary | ICD-10-CM

## 2018-03-03 PROCEDURE — 93005 ELECTROCARDIOGRAM TRACING: CPT | Mod: ZF

## 2018-03-03 PROCEDURE — G0463 HOSPITAL OUTPT CLINIC VISIT: HCPCS | Mod: 25,ZF

## 2018-03-03 PROCEDURE — 99213 OFFICE O/P EST LOW 20 MIN: CPT | Mod: ZP | Performed by: INTERNAL MEDICINE

## 2018-03-03 PROCEDURE — 93228 REMOTE 30 DAY ECG REV/REPORT: CPT | Performed by: INTERNAL MEDICINE

## 2018-03-03 PROCEDURE — 93010 ELECTROCARDIOGRAM REPORT: CPT | Mod: ZP | Performed by: INTERNAL MEDICINE

## 2018-03-03 PROCEDURE — 93270 REMOTE 30 DAY ECG REV/REPORT: CPT | Mod: ZF | Performed by: INTERNAL MEDICINE

## 2018-03-03 ASSESSMENT — PAIN SCALES - GENERAL: PAINLEVEL: MILD PAIN (3)

## 2018-03-03 NOTE — PATIENT INSTRUCTIONS
Patient Instructions:  It was a pleasure to see you in the cardiology clinic today.      If you have any questions, you can reach my nurse, Calixto Toribio, at [phone].  Press Option #1 for the [facility], and then press Option #3 for nursing.  We are encouraging the use of VisuMotion[employer] to communicate with your HealthCare Provider    Note the new medications: None  Stop the following medications: None    Follow the American Heart Association Diet and Lifestyle recommendations:  Limit saturated fat, trans fat, sodium, red meat, sweets and sugar-sweetened beverages. If you choose to eat red meat, compare labels and select the leanest cuts available.  Aim for at least 150 minutes of moderate physical activity or 75 minutes of vigorous physical activity - or an equal combination of both - each week.    The results from today include: EKG    Tests Ordered: Cardionet for 30 days and Referral to see EP Physician, Dr Sosa.    Sincerely,    WILLIAM Robles MD     If you have an urgent need after hours (8:00 am to 4:30 pm) please call [phone] and ask for the cardiology fellow on call.

## 2018-03-03 NOTE — NURSING NOTE
Chief Complaint   Patient presents with     Follow Up For     Vasovagal syncope, per pt     Vitals were taken and medications were reconciled.    Judith Schumacher MA    10:59 AM

## 2018-03-03 NOTE — PROGRESS NOTES
SUBJECTIVE:  Samara Oropeza is a 22 year old female who presents for evaluation of syncopal episodes.  Dec 2017 was admitted to neurology. Had Video/EEG monitoring for 3 days. This tried to identify 4 types of spells she complained of.  Spell type 1 are convulsive spells.  Spell type 2 are syncopal spells where she falls and loses awareness.  In the past, she has hurt her ankle with these specific spells.  Spell type 3 are staring spells where she is unresponsive.  Spell 4 are nightmares.  Video EEG recording during these 3 days, we recorded 1 spell on day 2 in which the patient felt tingling sensation in her toes and feet and lightheaded    Now here with recurrent syncopal episodes. In the past had ankle fracture and also 2 concussions as per patient. States she live on 3rd floor and by the time she reach 2nd floor,gets blurred vision.  No palpitation.   or cardiac symptoms. She C/O chest pain on and off and had a normal stress MRI in past.    She saw EP in past and was very unhappy with advise of increased salt and water intake.Had a tilt table test last December and results unknown.  Her diagnosis include Behcet's disease and RA .  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    Patient Active Problem List    Diagnosis Date Noted     Spells of decreased attentiveness 12/19/2017     Priority: Medium     Mobile cecum 11/09/2017     Priority: Medium     Cecum noted in Right lower quadrant on 4/17 CT scan, and in Left upper Quadrant on CT on 11/2017.       Vitamin D deficiency 10/11/2017     Priority: Medium     How low, unknown/not found. On D when tested at 28. Starting cholecalciferol October 2017. Needs recheck.       Convulsions, unspecified convulsion type (H) 10/03/2017     Priority: Medium     Transient alteration of awareness 10/03/2017     Priority: Medium     Chronic pain syndrome 07/27/2017     Priority: Medium     Major depressive disorder, recurrent episode, moderate (H)  06/27/2017     Priority: Medium     Cervical pain 05/02/2017     Priority: Medium     Acute left ankle pain 03/31/2017     Priority: Medium     Cervical dystonia 03/28/2017     Priority: Medium     PTSD (post-traumatic stress disorder) 01/17/2017     Priority: Medium     Patellofemoral instability 10/20/2016     Priority: Medium     Fibromyalgia 08/04/2016     Priority: Medium     Rheumatoid arthritis of multiple sites without rheumatoid factor (H) 08/04/2016     Priority: Medium     Raynaud's disease without gangrene 08/04/2016     Priority: Medium     Chronic abdominal pain 08/04/2016     Priority: Medium     Palpitations 01/12/2016     Priority: Medium     On colchicine therapy 10/30/2015     Priority: Medium     Spell of shaking 05/06/2015     Priority: Medium     Migraine 02/04/2015     Priority: Medium     Problem list name updated by automated process. Provider to review       Behcet's disease (H) 12/10/2014     Priority: Medium     Headaches due to old head injury 11/12/2013     Priority: Medium     Milk protein intolerance 10/11/2013     Priority: Medium     Concussion 02/13/2013     Priority: Medium     Jan 2013, with prolonged recovery- followed by sports med         Knee pain 01/03/2013     Priority: Medium     TAHIR (generalized anxiety disorder) 06/25/2009     Priority: Medium     Tics - Tourette syndrome 05/18/2009     Priority: Medium     Followed by psychotherapy. Symptoms well managed. Originally diagnosed at [facility] neurology. (Dr. Simpson)           IBS (irritable bowel syndrome) 05/18/2009     Priority: Medium     Seasonal allergic rhinitis 05/18/2009     Priority: Medium    .  Current Outpatient Prescriptions   Medication Sig     OnabotulinumtoxinA (BOTOX IJ) Inject 165 Units as directed once Lot#: /C3  Exp: 10/2020     OnabotulinumtoxinA (BOTOX IJ) Inject 165 Units as directed once Lot # /[id number]   Exp:  10/2020     hydrOXYzine (ATARAX) 25 MG tablet Take 1-2 tablets (25-50 mg) by mouth every  6 hours as needed for anxiety     cyproheptadine (PERIACTIN) 4 MG tablet Take 1 tablet (4 mg) by mouth At Bedtime For nightmares     lubiprostone (AMITIZA) 24 MCG capsule Take 1 capsule (24 mcg) by mouth 2 times daily (with meals)     benzocaine (TOPICALE XTRA) 20 % GEL Apply as needed locally to mouth or nasal ulcers for pain; 4 times daily as needed     colchicine 0.6 MG tablet TAKE 2 TABLETS BY MOUTH EVERY MORNING AND 1 TABLET EVERY EVENING.     apremilast (OTEZLA) 30 MG tablet Take 1 tablet (30 mg) by mouth 2 times daily     linaclotide (LINZESS) 145 MCG capsule Take 1 capsule (145 mcg) by mouth every morning (before breakfast)     dicyclomine (BENTYL) 20 MG tablet Take 1 tablet (20 mg) by mouth 4 times daily as needed     aspirin-acetaminophen-caffeine (EXCEDRIN MIGRAINE) 250-250-65 MG per tablet Take 1 tablet by mouth every 6 hours as needed for headaches     SPRINTEC 28 0.25-35 MG-MCG per tablet TAKE 1 TABLET BY MOUTH ONCE DAILY.     OnabotulinumtoxinA (BOTOX IJ) Inject 165 Units into the muscle once Lot /C3  Exp 06/2020     guanFACINE (TENEX) 1 MG tablet Take 3 tablets (3 mg) by mouth 2 times daily     diltiazem 2% in PLO cream, FV COMPOUNDED, 2% GEL Apply small pea size amount three times daily to anus until pain is gone.     COMPOUNDED NON-CONTROLLED SUBSTANCE (CMPD RX) - PHARMACY TO MIX COMPOUNDED MEDICATION Take one capsule in the morning     amitriptyline (ELAVIL) 50 MG tablet Take 1 tablet (50 mg) by mouth At Bedtime     lactulose 20 GM/30ML SOLN Take 30 mLs by mouth 3 times daily as needed     LORazepam (ATIVAN) 1 MG tablet Take 0.5 tablets (0.5 mg) by mouth 2 times daily as needed for other (atypical chest pain) Do not operate a vehicle after taking this medication     ondansetron (ZOFRAN-ODT) 8 MG ODT tab Take 1 tablet (8 mg) by mouth every 8 hours as needed for nausea     diphenhydrAMINE (BENADRYL) 25 MG tablet Take 1 tablet (25 mg) by mouth every 8 hours as needed for allergies     sucralfate  (CARAFATE) 1 GM/10ML suspension Take 10 mLs (1 g) by mouth 4 times daily     diclofenac (VOLTAREN) 1 % GEL topical gel Apply 2-4 grams to affected area(s) up to 4 times per day as needed. This is an anti-inflammatory medication.     aspirin (ASPIRIN CHILDRENS) 81 MG chewable tablet Take 81 mg by mouth as needed      omeprazole (PRILOSEC) 40 MG capsule Take 1 capsule (40 mg) by mouth daily     EPINEPHrine (EPIPEN 2-EAMON) 0.3 MG/0.3ML injection Inject 0.3 mLs (0.3 mg) into the muscle as needed for anaphylaxis     albuterol (PROAIR HFA/PROVENTIL HFA/VENTOLIN HFA) 108 (90 BASE) MCG/ACT Inhaler Inhale 2 puffs into the lungs every 6 hours as needed for shortness of breath / dyspnea or wheezing     Magic Mouthwash (FV std formula) lidocaine visc 2% 2.5mL/5mL & maalox/mylanta w/ simeth 2.5mL/5mL & diphenhydrAMINE 5mg/5mL Swish and swallow 10 mLs in mouth every 6 hours as needed for mouth sores     clobetasol (TEMOVATE) 0.05 % cream Apply topically 2 times daily     botulinum toxin type A (BOTOX) 100 UNITS injection Inject 200 Units into the muscle every 3 months     hyoscyamine (ANASPAZ,LEVSIN) 0.125 MG tablet Take 1-2 tablets (125-250 mcg) by mouth every 4 hours as needed for cramping     hypromellose (GENTEAL) 0.3 % SOLN 1 drop every hour as needed for dry eyes      betamethasone valerate (VALISONE) 0.1 % cream Apply topically 2 times daily     carbamide peroxide (DEBROX) 6.5 % otic solution 5 drops 2 times daily     Lactase (LACTAID PO) Take by mouth daily     lidocaine (XYLOCAINE) 2 % jelly Apply 1 Tube topically daily Reported on 4/21/2017     triamcinolone (KENALOG) 0.1 % cream Apply sparingly to oral ulcers three times daily for 14 days as needed.     No current facility-administered medications for this visit.      Past Medical History:   Diagnosis Date     Anxiety      Arthritis      Behcet's disease (H)      Cervical adenitis May 2010     Chronic abdominal pain      Constipation, chronic 1994     Gastro-oesophageal  reflux disease      Gastroparesis      Migraines      Neuromuscular disorder (H)     fibramyalgia     Palpitations      Seizure (H)      Seizures (H)     unknown etiology     Syncope      Tourette's      Past Surgical History:   Procedure Laterality Date     ARTHROSCOPY KNEE WITH PATELLAR REALIGNMENT  7/25/2013    Procedure: ARTHROSCOPY KNEE WITH PATELLAR REALIGNMENT;  Left Knee Arthroscopy, Medial Patellofemoral Ligament Reconstruction with Allograft  ;  Surgeon: Jennifer Acevedo MD;  Location: [facility]     COLONOSCOPY  2015     DENTAL SURGERY  1996    Teeth removal     ENDOSCOPY UPPER, COLONOSCOPY, COMBINED  2005     HC ESOPH/GAS REFLUX TEST W NASAL IMPED >1 HR N/A 2/15/2017    Procedure: ESOPHAGEAL IMPEDENCE FUNCTION TEST WITH 24 HOUR PH GREATER THAN 1 HOUR;  Surgeon: Timothy Matta MD;  Location:  GI     Allergies   Allergen Reactions     Amoxil [Penicillins] Rash     Dad unsure of reaction.     Bee Venom Anaphylaxis     Contrast Dye Rash     Contrast [place] [facility] [place] Contrast [facility] [place].  NOTE: this is a contrast media oral with iodine. Premedicate with methylpred standard for IV contrast, request barium contrast for oral contrast.     Kiwi Swelling     Orange Fruit [Citrus] Anaphylaxis     Pineapple Anaphylaxis, Difficulty breathing and Rash     Reglan [Metoclopramide] Other (See Comments)     IV dose only, in ER, rapid heart rate.     Ace Inhibitors      Difficulty in breathing and GI upset     Amitiza [Lubiprostone] Nausea and Vomiting     Amoxicillin-Pot Clavulanate      Latex      Midazolam Unknown     Other reaction(s): Unknown  parent states that when pt takes this medication, she wakes up being very violent .  parent states that when pt takes this medication, she wakes up being very violent .     No Clinical Screening - See Comments      Bleech/ chest tightness, itchy throat, swollen tongue, hives     Tomatoes [Tomato]      Versed      Coming out of  "pelvic exam at age of 5, was kicking and screaming when coming out of the versed.     Adhesive Tape Rash     Azithromycin Hives and Rash     Cephalexin Itching and Rash     Itchy mouth  Itchy mouth     Keflex [Cephalexin-Fd&C Yellow #6] Hives     Social History     Social History     Marital status: Single     Spouse name: N/A     Number of children: N/A     Years of education: N/A     Occupational History     Not on file.     Social History Main Topics     Smoking status: Never Smoker     Smokeless tobacco: Never Used     Alcohol use 0.6 oz/week     1 Standard drinks or equivalent per week      Comment: rarely     Drug use: No     Sexual activity: Yes     Partners: Male     Birth control/ protection: Pill     Other Topics Concern     Not on file     Social History Narrative    Boyfriend of 5 years, living with \"in laws\" Oct 2017 and looking forward to their own apartment.      Family History   Problem Relation Age of Onset     Depression Mother      Neurologic Disorder Mother      Migraines, take imitrex injection.  Also in maternal grandmother.       Alcohol/Drug Father      Hypertension Father      Depression Father      Cardiovascular Maternal Grandmother      Depression Maternal Grandmother      Hypertension Maternal Grandmother      Alzheimer Disease Maternal Grandmother      Cardiovascular Maternal Grandfather      Hypertension Maternal Grandfather      Depression Maternal Grandfather      Alcohol/Drug Maternal Grandfather      Cardiovascular Paternal Grandmother      Hypertension Paternal Grandmother      Cardiovascular Paternal Grandfather      Hypertension Paternal Grandfather      Glaucoma No family hx of      Macular Degeneration No family hx of           REVIEW OF SYSTEMS:  General: negative, fever, chills, night sweats  Skin: negative, acne, rash and scaling  Eyes: negative, double vision, eye pain and contacts  Ears/Nose/Throat: negative, nasal congestion and purulent rhinorrhea  Respiratory: No " "dyspnea on exertion, No cough, No hemoptysis and negative  Cardiovascular: negative, exertional chest pain or pressure, paroxysmal nocturnal dyspnea and orthopnea  Gastrointestinal: negative, dysphagia, nausea and vomiting  Genitourinary: negative, nocturia, dysuria and frequency  Musculoskeletal: negative, fracture, back pain and neck pain  Neurologic: negative, headaches, syncope, stroke and seizures  Psychiatric: negative, nervous breakdown, thoughts of self-harm and thoughts of hurting someone else  Hematologic/Lymphatic/Immunologic: negative, bleeding disorder, chills and fever  Endocrine: negative, cold intolerance, heat intolerance and hot flashes       OBJECTIVE:  Blood pressure 119/84, pulse 88, height 1.581 m (5' 2.25\"), weight 68 kg (150 lb), SpO2 100 %, not currently breastfeeding.  General Appearance: alert and no distress  Head: Normocephalic. No masses, lesions, tenderness or abnormalities  Eyes: conjuctiva clear, PERRL, EOM intact  Ears: External ears normal. Canals clear. TM's normal.  Nose: Nares normal  Mouth: normal  Neck: Supple, no cervical adenopathy, no thyromegaly  Lungs: clear to auscultation  Cardiac: regular rate and rhythm, normal S1 and S2, no murmur  Abdomen: Soft, nontender.  Normal bowel sounds.  No hepatosplenomegaly or abnormal masses  Extremities: no peripheral edema, peripheral pulses normal  Musculoskeletal: negative  Neurological: Cranial nerves 2-12 intact, motor strength intact       ASSESSMENT/PLAN:  22 yr old female with recurrent syncope.  Do have significant functional issues.  Referred by neurology to exclude arrhythmia.  EKGs reviewed. NSR. Normal.  Stress MRI 2016 reviewed. Normal.  Will place a 30 day event monitor.  Her syncopal episodes and the description are difficult to interpret.  She states had concussion and ankle fracture during syncope.  Will refer to Dr. Dacosta for an opinion.  Per orders.   Return to Clinic  PRN.  "

## 2018-03-03 NOTE — NURSING NOTE
Per Dr. WILLIAM Lynn, patient to have 30 day BioTel monitor placed.  Diagnosis: syncope  Monitor placed: Yes  Patient Instructed: Yes  Patient verbalized understanding: Yes  Holter # [id number]  Card ID: [id number]  Placed by KRISHNA Bedolla

## 2018-03-03 NOTE — LETTER
3/3/2018      RE: Samara Oropeza  [address]   [place] [address]       Dear Colleague,    Thank you for the opportunity to participate in the care of your patient, Samara Oropeza, at the [facility] at [facility]. Please see a copy of my visit note below.       SUBJECTIVE:  Samara Oropeza is a 22 year old female who presents for evaluation of syncopal episodes.  Dec 2017 was admitted to neurology. Had Video/EEG monitoring for 3 days. This tried to identify 4 types of spells she complained of.  Spell type 1 are convulsive spells.  Spell type 2 are syncopal spells where she falls and loses awareness.  In the past, she has hurt her ankle with these specific spells.  Spell type 3 are staring spells where she is unresponsive.  Spell 4 are nightmares.  Video EEG recording during these 3 days, we recorded 1 spell on day 2 in which the patient felt tingling sensation in her toes and feet and lightheaded    Now here with recurrent syncopal episodes. In the past had ankle fracture and also 2 concussions as per patient. States she live on 3rd floor and by the time she reach 2nd floor,gets blurred vision.  No palpitation.   or cardiac symptoms. She C/O chest pain on and off and had a normal stress MRI in past.    She saw EP in past and was very unhappy with advise of increased salt and water intake.Had a tilt table test last December and results unknown.  Her diagnosis include Behcet's disease and RA .  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    Patient Active Problem List    Diagnosis Date Noted     Spells of decreased attentiveness 12/19/2017     Priority: Medium     Mobile cecum 11/09/2017     Priority: Medium     Cecum noted in Right lower quadrant on 4/17 CT scan, and in Left upper Quadrant on CT on 11/2017.       Vitamin D deficiency 10/11/2017     Priority: Medium     How low, unknown/not found. On D when tested at 28.  Starting cholecalciferol October 2017. Needs recheck.       Convulsions, unspecified convulsion type (H) 10/03/2017     Priority: Medium     Transient alteration of awareness 10/03/2017     Priority: Medium     Chronic pain syndrome 07/27/2017     Priority: Medium     Major depressive disorder, recurrent episode, moderate (H) 06/27/2017     Priority: Medium     Cervical pain 05/02/2017     Priority: Medium     Acute left ankle pain 03/31/2017     Priority: Medium     Cervical dystonia 03/28/2017     Priority: Medium     PTSD (post-traumatic stress disorder) 01/17/2017     Priority: Medium     Patellofemoral instability 10/20/2016     Priority: Medium     Fibromyalgia 08/04/2016     Priority: Medium     Rheumatoid arthritis of multiple sites without rheumatoid factor (H) 08/04/2016     Priority: Medium     Raynaud's disease without gangrene 08/04/2016     Priority: Medium     Chronic abdominal pain 08/04/2016     Priority: Medium     Palpitations 01/12/2016     Priority: Medium     On colchicine therapy 10/30/2015     Priority: Medium     Spell of shaking 05/06/2015     Priority: Medium     Migraine 02/04/2015     Priority: Medium     Problem list name updated by automated process. Provider to review       Behcet's disease (H) 12/10/2014     Priority: Medium     Headaches due to old head injury 11/12/2013     Priority: Medium     Milk protein intolerance 10/11/2013     Priority: Medium     Concussion 02/13/2013     Priority: Medium     Jan 2013, with prolonged recovery- followed by sports med         Knee pain 01/03/2013     Priority: Medium     TAHIR (generalized anxiety disorder) 06/25/2009     Priority: Medium     Tics - Tourette syndrome 05/18/2009     Priority: Medium     Followed by psychotherapy. Symptoms well managed. Originally diagnosed at [facility] neurology. (Dr. Simpson)           IBS (irritable bowel syndrome) 05/18/2009     Priority: Medium     Seasonal allergic rhinitis 05/18/2009     Priority: Medium     .  Current Outpatient Prescriptions   Medication Sig     OnabotulinumtoxinA (BOTOX IJ) Inject 165 Units as directed once Lot#: /C3  Exp: 10/2020     OnabotulinumtoxinA (BOTOX IJ) Inject 165 Units as directed once Lot # /[id number]   Exp:  10/2020     hydrOXYzine (ATARAX) 25 MG tablet Take 1-2 tablets (25-50 mg) by mouth every 6 hours as needed for anxiety     cyproheptadine (PERIACTIN) 4 MG tablet Take 1 tablet (4 mg) by mouth At Bedtime For nightmares     lubiprostone (AMITIZA) 24 MCG capsule Take 1 capsule (24 mcg) by mouth 2 times daily (with meals)     benzocaine (TOPICALE XTRA) 20 % GEL Apply as needed locally to mouth or nasal ulcers for pain; 4 times daily as needed     colchicine 0.6 MG tablet TAKE 2 TABLETS BY MOUTH EVERY MORNING AND 1 TABLET EVERY EVENING.     apremilast (OTEZLA) 30 MG tablet Take 1 tablet (30 mg) by mouth 2 times daily     linaclotide (LINZESS) 145 MCG capsule Take 1 capsule (145 mcg) by mouth every morning (before breakfast)     dicyclomine (BENTYL) 20 MG tablet Take 1 tablet (20 mg) by mouth 4 times daily as needed     aspirin-acetaminophen-caffeine (EXCEDRIN MIGRAINE) 250-250-65 MG per tablet Take 1 tablet by mouth every 6 hours as needed for headaches     SPRINTEC 28 0.25-35 MG-MCG per tablet TAKE 1 TABLET BY MOUTH ONCE DAILY.     OnabotulinumtoxinA (BOTOX IJ) Inject 165 Units into the muscle once Lot /C3  Exp 06/2020     guanFACINE (TENEX) 1 MG tablet Take 3 tablets (3 mg) by mouth 2 times daily     diltiazem 2% in PLO cream, FV COMPOUNDED, 2% GEL Apply small pea size amount three times daily to anus until pain is gone.     COMPOUNDED NON-CONTROLLED SUBSTANCE (CMPD RX) - PHARMACY TO MIX COMPOUNDED MEDICATION Take one capsule in the morning     amitriptyline (ELAVIL) 50 MG tablet Take 1 tablet (50 mg) by mouth At Bedtime     lactulose 20 GM/30ML SOLN Take 30 mLs by mouth 3 times daily as needed     LORazepam (ATIVAN) 1 MG tablet Take 0.5 tablets (0.5 mg) by mouth 2 times daily  as needed for other (atypical chest pain) Do not operate a vehicle after taking this medication     ondansetron (ZOFRAN-ODT) 8 MG ODT tab Take 1 tablet (8 mg) by mouth every 8 hours as needed for nausea     diphenhydrAMINE (BENADRYL) 25 MG tablet Take 1 tablet (25 mg) by mouth every 8 hours as needed for allergies     sucralfate (CARAFATE) 1 GM/10ML suspension Take 10 mLs (1 g) by mouth 4 times daily     diclofenac (VOLTAREN) 1 % GEL topical gel Apply 2-4 grams to affected area(s) up to 4 times per day as needed. This is an anti-inflammatory medication.     aspirin (ASPIRIN CHILDRENS) 81 MG chewable tablet Take 81 mg by mouth as needed      omeprazole (PRILOSEC) 40 MG capsule Take 1 capsule (40 mg) by mouth daily     EPINEPHrine (EPIPEN 2-EAMON) 0.3 MG/0.3ML injection Inject 0.3 mLs (0.3 mg) into the muscle as needed for anaphylaxis     albuterol (PROAIR HFA/PROVENTIL HFA/VENTOLIN HFA) 108 (90 BASE) MCG/ACT Inhaler Inhale 2 puffs into the lungs every 6 hours as needed for shortness of breath / dyspnea or wheezing     Magic Mouthwash (FV std formula) lidocaine visc 2% 2.5mL/5mL & maalox/mylanta w/ simeth 2.5mL/5mL & diphenhydrAMINE 5mg/5mL Swish and swallow 10 mLs in mouth every 6 hours as needed for mouth sores     clobetasol (TEMOVATE) 0.05 % cream Apply topically 2 times daily     botulinum toxin type A (BOTOX) 100 UNITS injection Inject 200 Units into the muscle every 3 months     hyoscyamine (ANASPAZ,LEVSIN) 0.125 MG tablet Take 1-2 tablets (125-250 mcg) by mouth every 4 hours as needed for cramping     hypromellose (GENTEAL) 0.3 % SOLN 1 drop every hour as needed for dry eyes      betamethasone valerate (VALISONE) 0.1 % cream Apply topically 2 times daily     carbamide peroxide (DEBROX) 6.5 % otic solution 5 drops 2 times daily     Lactase (LACTAID PO) Take by mouth daily     lidocaine (XYLOCAINE) 2 % jelly Apply 1 Tube topically daily Reported on 4/21/2017     triamcinolone (KENALOG) 0.1 % cream Apply sparingly  to oral ulcers three times daily for 14 days as needed.     No current facility-administered medications for this visit.      Past Medical History:   Diagnosis Date     Anxiety      Arthritis      Behcet's disease (H)      Cervical adenitis May 2010     Chronic abdominal pain      Constipation, chronic 1994     Gastro-oesophageal reflux disease      Gastroparesis      Migraines      Neuromuscular disorder (H)     fibramyalgia     Palpitations      Seizure (H)      Seizures (H)     unknown etiology     Syncope      Tourette's      Past Surgical History:   Procedure Laterality Date     ARTHROSCOPY KNEE WITH PATELLAR REALIGNMENT  7/25/2013    Procedure: ARTHROSCOPY KNEE WITH PATELLAR REALIGNMENT;  Left Knee Arthroscopy, Medial Patellofemoral Ligament Reconstruction with Allograft  ;  Surgeon: Jennifer Acevedo MD;  Location: [facility]     COLONOSCOPY  2015     DENTAL SURGERY  1996    Teeth removal     ENDOSCOPY UPPER, COLONOSCOPY, COMBINED  2005     HC ESOPH/GAS REFLUX TEST W NASAL IMPED >1 HR N/A 2/15/2017    Procedure: ESOPHAGEAL IMPEDENCE FUNCTION TEST WITH 24 HOUR PH GREATER THAN 1 HOUR;  Surgeon: Timothy Matta MD;  Location: [facility]     Allergies   Allergen Reactions     Amoxil [Penicillins] Rash     Dad unsure of reaction.     Bee Venom Anaphylaxis     Contrast Dye Rash     Contrast Media Ready-Box [facility], 04/09/2014.; Contrast Media Ready-Box [facility], 04/09/2014.  NOTE: this is a contrast media oral with iodine. Premedicate with methylpred standard for IV contrast, request barium contrast for oral contrast.     Kiwi Swelling     Orange Fruit [Citrus] Anaphylaxis     Pineapple Anaphylaxis, Difficulty breathing and Rash     Reglan [Metoclopramide] Other (See Comments)     IV dose only, in ER, rapid heart rate.     Ace Inhibitors      Difficulty in breathing and GI upset     Amitiza [Lubiprostone] Nausea and Vomiting     Amoxicillin-Pot Clavulanate      Latex      Midazolam Unknown     Other reaction(s):  "Unknown  parent states that when pt takes this medication, she wakes up being very violent .  parent states that when pt takes this medication, she wakes up being very violent .     No Clinical Screening - See Comments      Bleech/ chest tightness, itchy throat, swollen tongue, hives     Tomatoes [Tomato]      Versed      Coming out of pelvic exam at age of 5, was kicking and screaming when coming out of the versed.     Adhesive Tape Rash     Azithromycin Hives and Rash     Cephalexin Itching and Rash     Itchy mouth  Itchy mouth     Keflex [Cephalexin-Fd&C Yellow #6] Hives     Social History     Social History     Marital status: Single     Spouse name: N/A     Number of children: N/A     Years of education: N/A     Occupational History     Not on file.     Social History Main Topics     Smoking status: Never Smoker     Smokeless tobacco: Never Used     Alcohol use 0.6 oz/week     1 Standard drinks or equivalent per week      Comment: rarely     Drug use: No     Sexual activity: Yes     Partners: Male     Birth control/ protection: Pill     Other Topics Concern     Not on file     Social History Narrative    Boyfriend of 5 years, living with \"in laws\" Oct 2017 and looking forward to their own apartment.      Family History   Problem Relation Age of Onset     Depression Mother      Neurologic Disorder Mother      Migraines, take imitrex injection.  Also in maternal grandmother.       Alcohol/Drug Father      Hypertension Father      Depression Father      Cardiovascular Maternal Grandmother      Depression Maternal Grandmother      Hypertension Maternal Grandmother      Alzheimer Disease Maternal Grandmother      Cardiovascular Maternal Grandfather      Hypertension Maternal Grandfather      Depression Maternal Grandfather      Alcohol/Drug Maternal Grandfather      Cardiovascular Paternal Grandmother      Hypertension Paternal Grandmother      Cardiovascular Paternal Grandfather      Hypertension Paternal " "Grandfather      Glaucoma No family hx of      Macular Degeneration No family hx of           REVIEW OF SYSTEMS:  General: negative, fever, chills, night sweats  Skin: negative, acne, rash and scaling  Eyes: negative, double vision, eye pain and contacts  Ears/Nose/Throat: negative, nasal congestion and purulent rhinorrhea  Respiratory: No dyspnea on exertion, No cough, No hemoptysis and negative  Cardiovascular: negative, exertional chest pain or pressure, paroxysmal nocturnal dyspnea and orthopnea  Gastrointestinal: negative, dysphagia, nausea and vomiting  Genitourinary: negative, nocturia, dysuria and frequency  Musculoskeletal: negative, fracture, back pain and neck pain  Neurologic: negative, headaches, syncope, stroke and seizures  Psychiatric: negative, nervous breakdown, thoughts of self-harm and thoughts of hurting someone else  Hematologic/Lymphatic/Immunologic: negative, bleeding disorder, chills and fever  Endocrine: negative, cold intolerance, heat intolerance and hot flashes       OBJECTIVE:  Blood pressure 119/84, pulse 88, height 1.581 m (5' 2.25\"), weight 68 kg (150 lb), SpO2 100 %, not currently breastfeeding.  General Appearance: alert and no distress  Head: Normocephalic. No masses, lesions, tenderness or abnormalities  Eyes: conjuctiva clear, PERRL, EOM intact  Ears: External ears normal. Canals clear. TM's normal.  Nose: Nares normal  Mouth: normal  Neck: Supple, no cervical adenopathy, no thyromegaly  Lungs: clear to auscultation  Cardiac: regular rate and rhythm, normal S1 and S2, no murmur  Abdomen: Soft, nontender.  Normal bowel sounds.  No hepatosplenomegaly or abnormal masses  Extremities: no peripheral edema, peripheral pulses normal  Musculoskeletal: negative  Neurological: Cranial nerves 2-12 intact, motor strength intact       ASSESSMENT/PLAN:  22 yr old female with recurrent syncope.  Do have significant functional issues.  Referred by neurology to exclude arrhythmia.  EKGs " reviewed. NSR. Normal.  Stress MRI 2016 reviewed. Normal.  Will place a 30 day event monitor.  Her syncopal episodes and the description are difficult to interpret.  She states had concussion and ankle fracture during syncope.  Will refer to Dr. Dacosta for an opinion.  Per orders.   Return to Clinic  PRN.    Sincerely,     HUSSEIN Lynn MD

## 2018-03-03 NOTE — MR AVS SNAPSHOT
After Visit Summary   3/3/2018    Samara Oropeza    MRN: [id number]           Patient Information     Date Of Birth          1995        Visit Information        Provider Department      3/3/2018 11:00 AM HUSSEIN Lynn MD [facility]        Today's Diagnoses     Chest pain    -  1    Vasovagal syncope        Syncope          Care Instructions    Patient Instructions:  It was a pleasure to see you in the cardiology clinic today.      If you have any questions, you can reach my nurse, Calixto Toribio, at [phone].  Press Option #1 for the [facility], and then press Option #3 for nursing.  We are encouraging the use of Cardoc to communicate with your HealthCare Provider    Note the new medications: None  Stop the following medications: None    Follow the American Heart Association Diet and Lifestyle recommendations:  Limit saturated fat, trans fat, sodium, red meat, sweets and sugar-sweetened beverages. If you choose to eat red meat, compare labels and select the leanest cuts available.  Aim for at least 150 minutes of moderate physical activity or 75 minutes of vigorous physical activity - or an equal combination of both - each week.    The results from today include: EKG    Tests Ordered: Cardionet for 30 days and Referral to see EP Physician, Dr Sosa.    Sincerely,    WILLIAM Robles MD     If you have an urgent need after hours (8:00 am to 4:30 pm) please call [phone] and ask for the cardiology fellow on call.                    Follow-ups after your visit        Additional Services     Follow-Up with Electrophysiologist       Dr. Robles is referring to Dr. Sosa                  Your next 10 appointments already scheduled     Mar 08, 2018  1:00 PM CST   New Sleep Patient with Kimo Gaston PsyD   [facility] ([facility])    [address] [address]    [phone]            Apr 04, 2018  1:40 PM CDT   (Arrive by 1:25 PM)   Return Visit with Estela Cuenca MD   [facility] ([facility])    [address] [facility] [address]   [phone]            Apr 17, 2018 11:00 AM CDT   Return Visit with Austen Marquez MD   [facility] ([facility])    [address] [address]   [phone]            May 01, 2018 10:45 AM CDT   Return Visit with Cata Hurst NP   [facility]    [address]  [address]  [facility] [address]   [phone]            May 29, 2018  3:00 PM CDT   (Arrive by 2:45 PM)   Return Botox with Frederick Bender MD   [facility] ([facility])    [address] [place] [address] [phone]            Aug 21, 2018 11:00 AM CDT   (Arrive by 10:45 AM)   Return Botox with Frederick Bender MD   [facility] ([facility])    [address] [place] [address] [phone]              Future tests that were ordered for you today     Open Future Orders        Priority Expected Expires Ordered    Follow-Up with Electrophysiologist Routine 3/10/2018 6/8/2018 3/3/2018            Who to contact     If you have questions or need follow up information about today's clinic visit or your schedule please contact [facility] directly at [phone].  Normal or non-critical lab and imaging results will be communicated to you by MyChart, letter or phone within 4 business days after the clinic has received the results. If you do not hear from us within 7 days, please contact the clinic through MyChart or phone. If you have a critical or abnormal lab result, we  "will notify you by phone as soon as possible.  Submit refill requests through [employer] or call your pharmacy and they will forward the refill request to us. Please allow 3 business days for your refill to be completed.          Additional Information About Your Visit        [employer] Information     [employer] gives you secure access to your electronic health record. If you see a primary care provider, you can also send messages to your care team and make appointments. If you have questions, please call your primary care clinic.  If you do not have a primary care provider, please call [phone] and they will assist you.        Care EveryWhere ID     This is your Care EveryWhere ID. This could be used by other organizations to access your [place] medical records  [id number]        Your Vitals Were     Pulse Height Pulse Oximetry BMI (Body Mass Index)          88 1.581 m (5' 2.25\") 100% 27.22 kg/m2         Blood Pressure from Last 3 Encounters:   03/03/18 119/84   02/28/18 128/86   02/27/18 110/80    Weight from Last 3 Encounters:   03/03/18 68 kg (150 lb)   02/28/18 68 kg (150 lb)   02/27/18 68 kg (150 lb)              We Performed the Following     Cardiac Event Monitor - Peds/Adult     EKG 12-lead, tracing only (Same Day)        Primary Care Provider Office Phone # Fax #    Sonja EVI Laguerre [facility] [phone]       [address] [facility] [address]  [facility] [address]        Equal Access to Services     NABIL GALEANO : Hadii aad ku hadasho Soomaali, waaxda luqadaha, qaybta kaalmada adeegyada, mike redmond adedavid rodriguez . So [facility] [phone].    ATENCIÓN: Si habla español, tiene a livingston disposición servicios gratuitos de asistencia lingüística. Llame al [phone].    We comply with applicable federal civil rights laws and [place] laws. We do not discriminate on the basis of race, color, national origin, age, disability, sex, sexual orientation, or gender identity.            Thank you!     " Thank you for choosing [facility]  for your care. Our goal is always to provide you with excellent care. Hearing back from our patients is one way we can continue to improve our services. Please take a few minutes to complete the written survey that you may receive in the mail after your visit with us. Thank you!             Your Updated Medication List - Protect others around you: Learn how to safely use, store and throw away your medicines at www.disposemymeds.org.          This list is accurate as of 3/3/18 11:32 AM.  Always use your most recent med list.                   Brand Name Dispense Instructions for use Diagnosis    albuterol 108 (90 BASE) MCG/ACT Inhaler    PROAIR HFA/PROVENTIL HFA/VENTOLIN HFA    1 Inhaler    Inhale 2 puffs into the lungs every 6 hours as needed for shortness of breath / dyspnea or wheezing    Atypical chest pain       amitriptyline 50 MG tablet    ELAVIL    30 tablet    Take 1 tablet (50 mg) by mouth At Bedtime    Abdominal pain, generalized, Insomnia due to medical condition       apremilast 30 MG tablet    OTEZLA     Take 1 tablet (30 mg) by mouth 2 times daily    Behcet's disease (H)       ASPIRIN CHILDRENS 81 MG chewable tablet   Generic drug:  aspirin      Take 81 mg by mouth as needed        aspirin-acetaminophen-caffeine 250-250-65 MG per tablet    EXCEDRIN MIGRAINE     Take 1 tablet by mouth every 6 hours as needed for headaches        benzocaine 20 % Gel    TOPICALE XTRA    30 g    Apply as needed locally to mouth or nasal ulcers for pain; 4 times daily as needed    Behcet's disease (H)       betamethasone valerate 0.1 % cream    VALISONE     Apply topically 2 times daily        * BOTOX IJ      Inject 165 Units as directed once Lot#: /C3 Exp: 10/2020        * BOTOX IJ      Inject 165 Units as directed once Lot # /C3  Exp:  10/2020        * botulinum toxin type A 100 UNITS injection    BOTOX    200 Units    Inject 200 Units into the muscle every 3 months     Cervical dystonia       * BOTOX IJ      Inject 165 Units into the muscle once Lot /C3 Exp 06/2020        carbamide peroxide 6.5 % otic solution    DEBROX     5 drops 2 times daily        clobetasol 0.05 % cream    TEMOVATE    60 g    Apply topically 2 times daily    Folliculitis       colchicine 0.6 MG tablet     270 tablet    TAKE 2 TABLETS BY MOUTH EVERY MORNING AND 1 TABLET EVERY EVENING.    Behcet's disease (H)       COMPOUNDED NON-CONTROLLED SUBSTANCE - PHARMACY TO MIX COMPOUNDED MEDICATION    CMPD RX    30 capsule    Take one capsule in the morning    Fibromyalgia       cyproheptadine 4 MG tablet    PERIACTIN    30 tablet    Take 1 tablet (4 mg) by mouth At Bedtime For nightmares    Nightmares       diclofenac 1 % Gel topical gel    VOLTAREN    100 g    Apply 2-4 grams to affected area(s) up to 4 times per day as needed. This is an anti-inflammatory medication.    Polyarthralgia       dicyclomine 20 MG tablet    BENTYL    120 tablet    Take 1 tablet (20 mg) by mouth 4 times daily as needed    Abdominal cramping       diltiazem 2% in PLO cream (FV COMPOUNDED) 2% Gel     30 g    Apply small pea size amount three times daily to anus until pain is gone.    Anal fissure       diphenhydrAMINE 25 MG tablet    BENADRYL    30 tablet    Take 1 tablet (25 mg) by mouth every 8 hours as needed for allergies        EPINEPHrine 0.3 MG/0.3ML injection 2-pack    EPIPEN 2-EAMON    0.6 mL    Inject 0.3 mLs (0.3 mg) into the muscle as needed for anaphylaxis    Hx of bee sting allergy       guanFACINE 1 MG tablet    TENEX    270 tablet    Take 3 tablets (3 mg) by mouth 2 times daily    Tic       hydrOXYzine 25 MG tablet    ATARAX    90 tablet    Take 1-2 tablets (25-50 mg) by mouth every 6 hours as needed for anxiety    TAHIR (generalized anxiety disorder)       hyoscyamine 0.125 MG tablet    ANASPAZ/LEVSIN    40 tablet    Take 1-2 tablets (125-250 mcg) by mouth every 4 hours as needed for cramping    Abdominal pain, generalized        hypromellose 0.3 % Soln ophthalmic solution    GENTEAL     1 drop every hour as needed for dry eyes        LACTAID PO      Take by mouth daily        lactulose 20 GM/30ML Soln     300 mL    Take 30 mLs by mouth 3 times daily as needed    Constipation, unspecified constipation type       lidocaine 2 % topical gel    XYLOCAINE     Apply 1 Tube topically daily Reported on 4/21/2017        lidocaine visc 2% 2.5mL/5mL & maalox/mylanta w/ simeth 2.5mL/5mL & diphenhydrAMINE 5mg/5mL Susp suspension    [facility]    1 Bottle    Swish and swallow 10 mLs in mouth every 6 hours as needed for mouth sores    Behcet's syndrome (H)       linaclotide 145 MCG capsule    LINZESS    90 capsule    Take 1 capsule (145 mcg) by mouth every morning (before breakfast)    Chronic idiopathic constipation       LORazepam 1 MG tablet    ATIVAN    60 tablet    Take 0.5 tablets (0.5 mg) by mouth 2 times daily as needed for other (atypical chest pain) Do not operate a vehicle after taking this medication    Chronic abdominal pain       lubiprostone 24 MCG capsule    AMITIZA    30 capsule    Take 1 capsule (24 mcg) by mouth 2 times daily (with meals)    Chronic idiopathic constipation       omeprazole 40 MG capsule    priLOSEC    90 capsule    Take 1 capsule (40 mg) by mouth daily    Gastroesophageal reflux disease, esophagitis presence not specified       ondansetron 8 MG ODT tab    ZOFRAN-ODT    60 tablet    Take 1 tablet (8 mg) by mouth every 8 hours as needed for nausea    Non-intractable vomiting with nausea, unspecified vomiting type, Hematemesis, presence of nausea not specified       SPRINTEC 28 0.25-35 MG-MCG per tablet   Generic drug:  norgestimate-ethinyl estradiol     84 tablet    TAKE 1 TABLET BY MOUTH ONCE DAILY.    General counseling for prescription of oral contraceptives       sucralfate 1 GM/10ML suspension    CARAFATE    1200 mL    Take 10 mLs (1 g) by mouth 4 times daily    Bile reflux gastritis, Nausea        triamcinolone 0.1 % cream    KENALOG    15 g    Apply sparingly to oral ulcers three times daily for 14 days as needed.    Behcet's syndrome (H)       * Notice:  This list has 4 medication(s) that are the same as other medications prescribed for you. Read the directions carefully, and ask your doctor or other care provider to review them with you.

## 2018-03-05 LAB — INTERPRETATION ECG - MUSE: NORMAL

## 2018-03-08 ENCOUNTER — PRE VISIT (OUTPATIENT)
Dept: CARDIOLOGY | Facility: CLINIC | Age: 23
End: 2018-03-08

## 2018-03-08 DIAGNOSIS — F95.9 TIC: ICD-10-CM

## 2018-03-08 RX ORDER — GUANFACINE 1 MG/1
TABLET ORAL
Qty: 180 TABLET | Refills: 0 | Status: SHIPPED | OUTPATIENT
Start: 2018-03-08 | End: 2018-04-10

## 2018-03-08 NOTE — TELEPHONE ENCOUNTER
HPI:  Samara Oropeza is a 22 year old female who presents for evaluation of syncopal episodes.  Dec 2017 was admitted to neurology. Had Video/EEG monitoring for 3 days. This tried to identify 4 types of spells she complained of.  Spell type 1 are convulsive spells.  Spell type 2 are syncopal spells where she falls and loses awareness.  In the past, she has hurt her ankle with these specific spells.  Spell type 3 are staring spells where she is unresponsive.  Spell 4 are nightmares.  Video EEG recording during these 3 days, we recorded 1 spell on day 2 in which the patient felt tingling sensation in her toes and feet and lightheaded     In the past had ankle fracture and also 2 concussions as per patient. States she live on 3rd floor and by the time she reach 2nd floor,gets blurred vision.  No palpitation.   or cardiac symptoms. She C/O chest pain on and off and had a normal stress MRI in past.    She saw EP in past and was very unhappy with advise of increased salt and water intake.Had a tilt table test last December and results unknown.  Her diagnosis include Behcet's disease and RA .  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      PAST MEDICAL HISTORY:  Past Medical History:   Diagnosis Date     Anxiety      Arthritis      Behcet's disease (H)      Cervical adenitis May 2010     Chronic abdominal pain      Constipation, chronic 1994     Gastro-oesophageal reflux disease      Gastroparesis      Migraines      Neuromuscular disorder (H)     fibramyalgia     Palpitations      Seizure (H)      Seizures (H)     unknown etiology     Syncope      Tourette's        CURRENT MEDICATIONS:  Current Outpatient Prescriptions   Medication Sig Dispense Refill     OnabotulinumtoxinA (BOTOX IJ) Inject 165 Units as directed once Lot#: /C3  Exp: 10/2020       OnabotulinumtoxinA (BOTOX IJ) Inject 165 Units as directed once Lot # /C3   Exp:  10/2020       hydrOXYzine (ATARAX) 25 MG tablet Take  1-2 tablets (25-50 mg) by mouth every 6 hours as needed for anxiety 90 tablet 2     cyproheptadine (PERIACTIN) 4 MG tablet Take 1 tablet (4 mg) by mouth At Bedtime For nightmares 30 tablet 2     lubiprostone (AMITIZA) 24 MCG capsule Take 1 capsule (24 mcg) by mouth 2 times daily (with meals) 30 capsule 1     benzocaine (TOPICALE XTRA) 20 % GEL Apply as needed locally to mouth or nasal ulcers for pain; 4 times daily as needed 30 g 1     colchicine 0.6 MG tablet TAKE 2 TABLETS BY MOUTH EVERY MORNING AND 1 TABLET EVERY EVENING. 270 tablet 0     apremilast (OTEZLA) 30 MG tablet Take 1 tablet (30 mg) by mouth 2 times daily       linaclotide (LINZESS) 145 MCG capsule Take 1 capsule (145 mcg) by mouth every morning (before breakfast) 90 capsule 1     dicyclomine (BENTYL) 20 MG tablet Take 1 tablet (20 mg) by mouth 4 times daily as needed 120 tablet 3     aspirin-acetaminophen-caffeine (EXCEDRIN MIGRAINE) 250-250-65 MG per tablet Take 1 tablet by mouth every 6 hours as needed for headaches       SPRINTEC 28 0.25-35 MG-MCG per tablet TAKE 1 TABLET BY MOUTH ONCE DAILY. 84 tablet 2     OnabotulinumtoxinA (BOTOX IJ) Inject 165 Units into the muscle once Lot /C3  Exp 06/2020       guanFACINE (TENEX) 1 MG tablet Take 3 tablets (3 mg) by mouth 2 times daily 270 tablet 3     diltiazem 2% in PLO cream, FV COMPOUNDED, 2% GEL Apply small pea size amount three times daily to anus until pain is gone. 30 g 1     COMPOUNDED NON-CONTROLLED SUBSTANCE (CMPD RX) - PHARMACY TO MIX COMPOUNDED MEDICATION Take one capsule in the morning 30 capsule 0     amitriptyline (ELAVIL) 50 MG tablet Take 1 tablet (50 mg) by mouth At Bedtime 30 tablet 11     lactulose 20 GM/30ML SOLN Take 30 mLs by mouth 3 times daily as needed 300 mL 3     LORazepam (ATIVAN) 1 MG tablet Take 0.5 tablets (0.5 mg) by mouth 2 times daily as needed for other (atypical chest pain) Do not operate a vehicle after taking this medication 60 tablet 0     ondansetron (ZOFRAN-ODT)  8 MG ODT tab Take 1 tablet (8 mg) by mouth every 8 hours as needed for nausea 60 tablet 6     diphenhydrAMINE (BENADRYL) 25 MG tablet Take 1 tablet (25 mg) by mouth every 8 hours as needed for allergies 30 tablet 0     sucralfate (CARAFATE) 1 GM/10ML suspension Take 10 mLs (1 g) by mouth 4 times daily 1200 mL 2     diclofenac (VOLTAREN) 1 % GEL topical gel Apply 2-4 grams to affected area(s) up to 4 times per day as needed. This is an anti-inflammatory medication. 100 g 4     aspirin (ASPIRIN CHILDRENS) 81 MG chewable tablet Take 81 mg by mouth as needed        omeprazole (PRILOSEC) 40 MG capsule Take 1 capsule (40 mg) by mouth daily 90 capsule 3     EPINEPHrine (EPIPEN 2-EAMON) 0.3 MG/0.3ML injection Inject 0.3 mLs (0.3 mg) into the muscle as needed for anaphylaxis 0.6 mL 3     albuterol (PROAIR HFA/PROVENTIL HFA/VENTOLIN HFA) 108 (90 BASE) MCG/ACT Inhaler Inhale 2 puffs into the lungs every 6 hours as needed for shortness of breath / dyspnea or wheezing 1 Inhaler 1     Magic Mouthwash (FV std formula) lidocaine visc 2% 2.5mL/5mL & maalox/mylanta w/ simeth 2.5mL/5mL & diphenhydrAMINE 5mg/5mL Swish and swallow 10 mLs in mouth every 6 hours as needed for mouth sores 1 Bottle 1     clobetasol (TEMOVATE) 0.05 % cream Apply topically 2 times daily 60 g 0     botulinum toxin type A (BOTOX) 100 UNITS injection Inject 200 Units into the muscle every 3 months 200 Units 3     hyoscyamine (ANASPAZ,LEVSIN) 0.125 MG tablet Take 1-2 tablets (125-250 mcg) by mouth every 4 hours as needed for cramping 40 tablet 1     hypromellose (GENTEAL) 0.3 % SOLN 1 drop every hour as needed for dry eyes        betamethasone valerate (VALISONE) 0.1 % cream Apply topically 2 times daily       carbamide peroxide (DEBROX) 6.5 % otic solution 5 drops 2 times daily       Lactase (LACTAID PO) Take by mouth daily       lidocaine (XYLOCAINE) 2 % jelly Apply 1 Tube topically daily Reported on 4/21/2017       triamcinolone (KENALOG) 0.1 % cream Apply  sparingly to oral ulcers three times daily for 14 days as needed. 15 g 1       PAST SURGICAL HISTORY:  Past Surgical History:   Procedure Laterality Date     ARTHROSCOPY KNEE WITH PATELLAR REALIGNMENT  7/25/2013    Procedure: ARTHROSCOPY KNEE WITH PATELLAR REALIGNMENT;  Left Knee Arthroscopy, Medial Patellofemoral Ligament Reconstruction with Allograft  ;  Surgeon: Jennifer Acevedo MD;  Location: [facility]     COLONOSCOPY  2015     DENTAL SURGERY  1996    Teeth removal     ENDOSCOPY UPPER, COLONOSCOPY, COMBINED  2005     HC ESOPH/GAS REFLUX TEST W NASAL IMPED >1 HR N/A 2/15/2017    Procedure: ESOPHAGEAL IMPEDENCE FUNCTION TEST WITH 24 HOUR PH GREATER THAN 1 HOUR;  Surgeon: Timothy Matta MD;  Location: [facility]       ALLERGIES:     Allergies   Allergen Reactions     Amoxil [Penicillins] Rash     Dad unsure of reaction.     Bee Venom Anaphylaxis     Contrast Dye Rash     Contrast Media Ready-Box [facility], 04/09/2014.; Contrast Media Ready-Box [facility], 04/09/2014.  NOTE: this is a contrast media oral with iodine. Premedicate with methylpred standard for IV contrast, request barium contrast for oral contrast.     Kiwi Swelling     Orange Fruit [Citrus] Anaphylaxis     Pineapple Anaphylaxis, Difficulty breathing and Rash     Reglan [Metoclopramide] Other (See Comments)     IV dose only, in ER, rapid heart rate.     Ace Inhibitors      Difficulty in breathing and GI upset     Amitiza [Lubiprostone] Nausea and Vomiting     Amoxicillin-Pot Clavulanate      Latex      Midazolam Unknown     Other reaction(s): Unknown  parent states that when pt takes this medication, she wakes up being very violent .  parent states that when pt takes this medication, she wakes up being very violent .     No Clinical Screening - See Comments      Bleech/ chest tightness, itchy throat, swollen tongue, hives     Tomatoes [Tomato]      Versed      Coming out of pelvic exam at age of 5, was kicking and screaming when coming out of the versed.      Adhesive Tape Rash     Azithromycin Hives and Rash     Cephalexin Itching and Rash     Itchy mouth  Itchy mouth     Keflex [Cephalexin-Fd&C Yellow #6] Hives       FAMILY HISTORY:  Family History   Problem Relation Age of Onset     Depression Mother      Neurologic Disorder Mother      Migraines, take imitrex injection.  Also in maternal grandmother.       Alcohol/Drug Father      Hypertension Father      Depression Father      Cardiovascular Maternal Grandmother      Depression Maternal Grandmother      Hypertension Maternal Grandmother      Alzheimer Disease Maternal Grandmother      Cardiovascular Maternal Grandfather      Hypertension Maternal Grandfather      Depression Maternal Grandfather      Alcohol/Drug Maternal Grandfather      Cardiovascular Paternal Grandmother      Hypertension Paternal Grandmother      Cardiovascular Paternal Grandfather      Hypertension Paternal Grandfather      Glaucoma No family hx of      Macular Degeneration No family hx of          SOCIAL HISTORY:  Social History   Substance Use Topics     Smoking status: Never Smoker     Smokeless tobacco: Never Used     Alcohol use 0.6 oz/week     1 Standard drinks or equivalent per week      Comment: rarely       ROS:   Constitutional: No fever, chills, or sweats. Weight stable.   ENT: No visual disturbance, ear ache, epistaxis, sore throat.   Cardiovascular: As per HPI.   Respiratory: No cough, hemoptysis.    GI: No nausea, vomiting, hematemesis, melena, or hematochezia.   : No hematuria.   Integument: Negative.   Psychiatric: Negative.   Hematologic:  Easy bruising, no easy bleeding.  Neuro: Negative.   Endocrinology: No significant heat or cold intolerance   Musculoskeletal: No myalgia.    Exam:  There were no vitals taken for this visit.  GENERAL APPEARANCE: healthy, alert and no distress  HEENT: no icterus, no xanthelasmas, normal pupil size and reaction, normal palate, mucosa moist, no central cyanosis  NECK: no adenopathy, no  asymmetry, masses, or scars, thyroid normal to palpation and no bruits, JVP not elevated  RESPIRATORY: lungs clear to auscultation - no rales, rhonchi or wheezes, no use of accessory muscles, no retractions, respirations are unlabored, normal respiratory rate  CARDIOVASCULAR: regular rhythm, normal S1 with physiologic split S2, no S3 or S4 and no murmur, click or rub, precordium quiet with normal PMI.  ABDOMEN: soft, non tender, without hepatosplenomegaly, no masses palpable, bowel sounds normal, aorta not enlarged by palpation, no abdominal bruits  EXTREMITIES: peripheral pulses normal, no edema, no bruits  NEURO: alert and oriented to person/place/time, normal speech, gait and affect  VASC: Radial, femoral, dorsalis pedis and posterior tibialis pulses are normal in volumes and symmetric bilaterally. No bruits are heard.  SKIN: no ecchymoses, no rashes    Labs:  CBC RESULTS:   Lab Results   Component Value Date    WBC 5.8 01/17/2018    RBC 4.29 01/17/2018    HGB 11.9 01/17/2018    HCT 37.7 01/17/2018    MCV 88 01/17/2018    MCH 27.7 01/17/2018    MCHC 31.6 01/17/2018    RDW 13.7 01/17/2018     01/17/2018       BMP RESULTS:  Lab Results   Component Value Date     01/08/2018    POTASSIUM 3.8 01/08/2018    CHLORIDE 106 01/08/2018    CO2 26 01/08/2018    ANIONGAP 8 01/08/2018    GLC 93 01/08/2018    BUN 8 01/08/2018    CR 0.72 01/08/2018    GFRESTIMATED >90 01/08/2018    GFRESTBLACK >90 01/08/2018    SHANKAR 9.0 01/08/2018        INR RESULTS:  Lab Results   Component Value Date    INR 0.99 11/06/2016    INR 1.03 05/06/2015       Procedures:  PULMONARY FUNCTION TESTS:   PFT Latest Ref Rng & Units 1/13/2017   FVC L 3.61   FEV1 L 3.02   FVC% % 106   FEV1% % 101     12/19/17: Tilt:   The following maneuvers were done sequentially:  1- Supine for 50 minutes: /77 (cuff) 97/67 (Nexfin)  mmHg, HR 95 bpm. No significant change in hemodynamic profile. No symptoms.  2- Maneuvers in supine position:     A. Carotid  sinus massage:         No change in HR or BP, no symptoms     B. Cough: Jem BP 97/55 mmHg,  bpm, Recovery approx 0 sec (no significant change), No symptoms.     C. Valsalva: Jem BP 85/63 mmHg, HR 76 bpm, Recovery approx about 60 sec, No symptoms.     This was consistent with physiologic response.  3- Supine rest for 5 minutes: No significant change in hemodynamic profile.  4- TILT at 70 degrees for 25 minutes:   Time                                    HR                 BP                                       Comments  pretilt                                   76                  84/62                                   Nexfin  0                                          98                  77/65                                   Table up (cuff 113/69)  1                                          95                  89/69               2                                          99                  76/63  3                                          103                83/70                                   Cuff 111/75  4                                          101                111/75  5                                          104                77/65  10                                        106                95/74                                   Cuff 113/74  16                                        115                94/71                                   Cuff 119/80  20                                        114                101/77                                 Cuff 111/82, NTG 0.4 mg CL  21                                        126                93/71                                   Cuff 111/81  22                                        139                99/78                                   Cuff 112/77  24                                        178                91/73                                   Cuff 118/55  25                                        166                58/27       "                             Cuff 115/63 (while table brought down by RN's discretion.)  Patient felt warm and had headache.     Comment:  Tilt test was negative but terminated prematurely by RN.  She maintained her BP with compensatory sinus tachycardia.  HR was starting to slow down.  This could have been a waning of NTG response (suggesting a negative test), but cannot rule out early indication of a vagal response.  Test would have normally been terminated at 30 minutes.  A practical suggestion would be to recommend liberalization of fluid and salt.  This would be typical first line therapy for vasovagal spells, particularly in a young patient with normal or low BP.  VIDEO EEG DATE:  12/21/2017         MEDICATIONS: No anticonvulsants at this time.        IMPRESSION OF VIDEO EEG DAY #3: This is a normal awake and sleep video electroencephalogram. No electrographic seizures or epileptiform discharges were recorded. During hyperventilation she felt funny, she had \"prickly sensation in the hands and feet, legs were numb, and lots of pressure in the head\". These was a target event. During this time there was no EEG seizure activity. This was thought to be related to hyperventilation .  No electrographic seizure or epileptiform discharges were seen    3/30/2015: CMRI:     IMPRESSION:  1.  Regadenoson stress perfusion: Normal perfusion at rest and  post-stress without evidence of inducible ischemia.  2.  Normal left ventricular size and systolic function with a  calculated ejection fraction of  67 %.  3.  Normal right ventricular size and systolic function with a  calculated ejection fraction of 65%.   4.  On delayed enhancement imaging, there is no abnormal  hyperenhancement to suggest myocardial scar/inflammation/infiltration.  5.  There is normal origins and proximal course of the coronary  arteries.     The MRI sequences and imaging planes in this study were tailored for  cardiac imaging and are suboptimal for " evaluation of non-cardiac  structures.     FINDINGS     Stress     Stress perfusion: Normal  Rest perfusion: Normal  Inducible ischemia: No   Stress- induced symptoms: No      Rest ECG: Sinus tachycardia  Rest BP: 104/75 mm Hg  Rest HR: 105 bpm     Stress ECG: Normal sinus rhythm   Stress BP: 98/61 mm Hg  Stress HR: 93 bpm     Left ventricle  Cavity size: Normal  Wall thickness: Normal  Global systolic function: Normal with a quantitative LV ejection  fraction of 67%.   Regional wall motion: Normal  Delayed enhancement: None     Right ventricle  Cavity size: Normal  Wall thickness: Normal  Global systolic function: Normal with a quantitative RV ejection  fraction of 65%.   Regional wall motion: Normal     Atria  LA size: Normal  RA size: Normal     Valves  Aortic Valve  Valve morphology: Tricuspid  Aortic stenosis: None  Aortic regurgitation: None     Mitral Valve  Mitral stenosis: None  Mitral regurgitation: None     Tricuspid Valve   Tricuspid stenosis: None  Tricuspid regurgitation: None     Pulmonic Valve  Pulmonic stenosis: None  Pulmonic regurgitation: None     Extracardiac  Aortic root dimension: 30 mm (at the sinuses of Valsalva)  Pericardial thickness: Normal  Pericardial effusion:None  Pleural effusion: None     Measurements     LEFT VENTRICULAR VOLUME RESULTS     ED volume:            120.23 ml               (96 - 174 ml)                 ED volume index:      70.56 ml/m2             (56 - 100 ml/m2)              ES volume:            39.45 ml                (27 - 71 ml)                  ES volume index:      23.15 ml/m2                                           Stroke volume:        80.78 ml                (61 - 111 ml)                 Stroke volume index:  47.41 ml/m2                                           Cardiac output:       5.07 l/min                                            Cardiac output index: 2.98 l/(m2*min)                                       Ejection fraction:    67.19 %                  (54 - 74 %)                   LVDd:                                 50.52 mm  LVDs:                                 32.61 mm  ASd:                                  5.96 mm   PWd:                                  5 mm      RIGHT VENTRICULAR VOLUME RESULTS      ED volume:            109.96 ml               (83 - 178 ml)                 ED volume index:      64.54 ml/m2             (47 - 103 ml/m2)              ES volume:            38.35 ml                (32 - 73 ml)                  ES volume index:      22.51 ml/m2                                           Stroke volume:        71.61 ml                (44 - 113 ml)                 Stroke volume index:  42.03 ml/m2                                           Cardiac output:       4.50 l/min                                            Cardiac output index: 2.64 l/(m2*min)                                       Ejection fraction:    65.12 %                 (49 - 70 %)            Sedation and contrast  Contrast agent: MultiHance  Volume administered: 20 mL     Pulse sequences used  SSFP Localizers  SSFP Cine  IR SSFP Single Shot  IR GRE Segmented  IV contrast prior to stress (0.075 mmol/kg) and rest (0.075 mmol/kg)  perfusion imaging  Regadenoson 0.4mg IV over 10 seconds before stress imaging  Hemodynamic monitoring before, during, and after study  Contrast-enhanced early and late enhancement imaging  ECG gated, respiratory navigated MRA of the coronary arteries  Image post-processing was performed on a [employer] workstation       Assessment and Plan:       CC

## 2018-03-08 NOTE — TELEPHONE ENCOUNTER
guanFACINE (TENEX) 1 MG tablet      Last Written Prescription Date:  11/15/2017  Last Fill Quantity: 270 tablet,   # refills: 3  Last Office Visit: 11/09/2017  Future Office visit:    Next 5 appointments (look out 90 days)     Apr 17, 2018 11:00 AM CDT   Return Visit with Austen Marquez MD   [facility]    [address] [id number] [phone]            May 01, 2018 10:45 AM CDT   Return Visit with Cata Hurst NP   [facility]    [address]  [facility] [id number] [phone]                   Routing refill request to provider for review/approval because:  Drug not on the [facility], [facility] or  Health refill protocol or controlled substance    Thank you,  Wes Locke RN

## 2018-03-09 ENCOUNTER — TELEPHONE (OUTPATIENT)
Dept: FAMILY MEDICINE | Facility: CLINIC | Age: 23
End: 2018-03-09

## 2018-03-09 NOTE — TELEPHONE ENCOUNTER
guanFACINE (TENEX) 1 MG tablet 180 tablet 0 3/8/2018        Sig: Take 3 tablets (3 mg) by mouth twice daily morning and evening.      Class: E-Prescribe      E-Prescribing Status: Receipt confirmed by pharmacy (3/8/2018  4:38 PM CST)      Called pharmacy for E-prescribing error. They confirmed that they received the correct order yesterday.    Criselda Sanabria RN

## 2018-03-28 ENCOUNTER — TELEPHONE (OUTPATIENT)
Dept: FAMILY MEDICINE | Facility: CLINIC | Age: 23
End: 2018-03-28

## 2018-03-28 ENCOUNTER — OFFICE VISIT (OUTPATIENT)
Dept: FAMILY MEDICINE | Facility: CLINIC | Age: 23
End: 2018-03-28
Payer: COMMERCIAL

## 2018-03-28 VITALS
BODY MASS INDEX: 27.16 KG/M2 | SYSTOLIC BLOOD PRESSURE: 120 MMHG | OXYGEN SATURATION: 100 % | DIASTOLIC BLOOD PRESSURE: 74 MMHG | HEART RATE: 118 BPM | WEIGHT: 149.7 LBS | TEMPERATURE: 97.5 F

## 2018-03-28 DIAGNOSIS — R10.84 ABDOMINAL PAIN, GENERALIZED: ICD-10-CM

## 2018-03-28 DIAGNOSIS — R11.0 NAUSEA: Primary | ICD-10-CM

## 2018-03-28 DIAGNOSIS — S86.912A MUSCLE STRAIN OF LEFT LOWER EXTREMITY, INITIAL ENCOUNTER: ICD-10-CM

## 2018-03-28 DIAGNOSIS — F43.10 PTSD (POST-TRAUMATIC STRESS DISORDER): ICD-10-CM

## 2018-03-28 DIAGNOSIS — Q43.3 MOBILE CECUM (H): ICD-10-CM

## 2018-03-28 DIAGNOSIS — F95.2 TOURETTE'S: ICD-10-CM

## 2018-03-28 DIAGNOSIS — M25.50 POLYARTHRALGIA: ICD-10-CM

## 2018-03-28 PROCEDURE — 99214 OFFICE O/P EST MOD 30 MIN: CPT | Performed by: NURSE PRACTITIONER

## 2018-03-28 RX ORDER — HYOSCYAMINE SULFATE 0.125 MG
0.125-0.25 TABLET ORAL EVERY 4 HOURS PRN
Qty: 40 TABLET | Refills: 1 | Status: SHIPPED | OUTPATIENT
Start: 2018-03-28 | End: 2019-01-03

## 2018-03-28 RX ORDER — ONDANSETRON 8 MG/1
8 TABLET, FILM COATED ORAL EVERY 8 HOURS PRN
Qty: 20 TABLET | Refills: 1 | Status: SHIPPED | OUTPATIENT
Start: 2018-03-28 | End: 2018-04-17

## 2018-03-28 NOTE — PROGRESS NOTES
SUBJECTIVE:   Samara Oropeza is a 22 year old female who presents to clinic today for the following health issues:    Joint Pain    Onset: Monday    Description:   Location: left leg  Character: Cramping    Intensity: severe    Progression of Symptoms: worse    Accompanying Signs & Symptoms:  Other symptoms: numbness, tingling and warmth, mild swelling    History:   Previous similar pain: YES      Precipitating factors:   Trauma or overuse: no     Alleviating factors:  Improved by: heat     Therapies Tried and outcome: Ice, squeezing it    Anxiety/PTSD Follow-Up    Status since last visit: No change    Other associated symptoms:None    Complicating factors:   Significant life event: Yes-  Ongoing health issues; may be doing an intensive outpatient pain management program through [place] for abdominal pain, as well as pelvic floor therapy. So far the physical therapy has been very painful. Ongoing severe family stress with both her and partner's families. They have gotten engaged and starting wedding planning. She has not been working due to frequency of doctor's visits and outpatient therapy. Has recently started a medication for nightmares, which has been really helpful.  Current substance abuse: None  Depression symptoms: No  TAHIR-7 SCORE 10/9/2017 12/12/2017 2/27/2018   Total Score - - -   Total Score 19 (severe anxiety) 10 (moderate anxiety) 18 (severe anxiety)   Total Score 19 10 18       TAHIR-7      -------------------------------------    Problem list and histories reviewed & adjusted, as indicated.  Additional history: as documented    Patient Active Problem List   Diagnosis     Tics - Tourette syndrome     IBS (irritable bowel syndrome)     Seasonal allergic rhinitis     TAHIR (generalized anxiety disorder)     Knee pain     Concussion     Milk protein intolerance     Headaches due to old head injury     Behcet's disease (H)     Migraine     Spell of shaking     On colchicine therapy     Palpitations      Fibromyalgia     Rheumatoid arthritis of multiple sites without rheumatoid factor (H)     Raynaud's disease without gangrene     Chronic abdominal pain     Patellofemoral instability     PTSD (post-traumatic stress disorder)     Cervical dystonia     Acute left ankle pain     Cervical pain     Major depressive disorder, recurrent episode, moderate (H)     Chronic pain syndrome     Convulsions, unspecified convulsion type (H)     Transient alteration of awareness     Vitamin D deficiency     Mobile cecum     Spells of decreased attentiveness     Past Surgical History:   Procedure Laterality Date     ARTHROSCOPY KNEE WITH PATELLAR REALIGNMENT  7/25/2013    Procedure: ARTHROSCOPY KNEE WITH PATELLAR REALIGNMENT;  Left Knee Arthroscopy, Medial Patellofemoral Ligament Reconstruction with Allograft  ;  Surgeon: Jennifer Acevedo MD;  Location: [facility]     COLONOSCOPY  2015     DENTAL SURGERY  1996    Teeth removal     ENDOSCOPY UPPER, COLONOSCOPY, COMBINED  2005     HC ESOPH/GAS REFLUX TEST W NASAL IMPED >1 HR N/A 2/15/2017    Procedure: ESOPHAGEAL IMPEDENCE FUNCTION TEST WITH 24 HOUR PH GREATER THAN 1 HOUR;  Surgeon: Timothy Matta MD;  Location:  GI       Social History   Substance Use Topics     Smoking status: Never Smoker     Smokeless tobacco: Never Used     Alcohol use 0.6 oz/week     1 Standard drinks or equivalent per week      Comment: rarely     Family History   Problem Relation Age of Onset     Depression Mother      Neurologic Disorder Mother      Migraines, take imitrex injection.  Also in maternal grandmother.       Alcohol/Drug Father      Hypertension Father      Depression Father      Cardiovascular Maternal Grandmother      Depression Maternal Grandmother      Hypertension Maternal Grandmother      Alzheimer Disease Maternal Grandmother      Cardiovascular Maternal Grandfather      Hypertension Maternal Grandfather      Depression Maternal Grandfather      Alcohol/Drug Maternal Grandfather       Cardiovascular Paternal Grandmother      Hypertension Paternal Grandmother      Cardiovascular Paternal Grandfather      Hypertension Paternal Grandfather      Glaucoma No family hx of      Macular Degeneration No family hx of            Reviewed and updated as needed this visit by clinical staff  Tobacco  Allergies       Reviewed and updated as needed this visit by Provider         ROS:  Constitutional, HEENT, cardiovascular, pulmonary, gi and gu systems are negative, except as otherwise noted.    OBJECTIVE:     /74  Pulse 118  Temp 97.5  F (36.4  C) (Oral)  Wt 149 lb 11.2 oz (67.9 kg)  SpO2 100%  BMI 27.16 kg/m2  Body mass index is 27.16 kg/(m^2).   GENERAL: healthy, alert and no distress  RESP: lungs clear to auscultation - no rales, rhonchi or wheezes  CV: regular rate and rhythm, normal S1 S2, no S3 or S4, no murmur, click or rub, no peripheral edema and peripheral pulses strong  MS: tenderness to palpation muscles in posterior left calf; normal range of motion, no swelling, no erythema;    Diagnostic Test Results:  none     ASSESSMENT/PLAN:     Problem List Items Addressed This Visit     Mobile cecum    Relevant Orders    CARE COORDINATION REFERRAL (Completed)      Other Visit Diagnoses     Nausea    -  Primary    Relevant Medications    ondansetron (ZOFRAN) 8 MG tablet    Polyarthralgia        Relevant Medications    diclofenac (VOLTAREN) 1 % GEL topical gel    Abdominal pain, generalized        Relevant Medications    hyoscyamine (ANASPAZ/LEVSIN) 0.125 MG tablet    Tourette's        Relevant Orders    CARE COORDINATION REFERRAL (Completed)    Muscle strain of left lower extremity, initial encounter             Advised wrapping knee for additional support, follow up if develop swelling and erythema in leg      EVI Cardona CNP  [facility]  Please note greater than 50% of this 30 minute appointment were spent in face to face counseling with the patient of the issues  described above in the history of present illness and in the plan, including refilling medications, and providing referrals

## 2018-03-28 NOTE — MR AVS SNAPSHOT
After Visit Summary   3/28/2018    Samara Oropeza    MRN: [id number]           Patient Information     Date Of Birth          1995        Visit Information        Provider Department      3/28/2018 5:45 PM Sonja Abreu APRN CNP [facility]        Today's Diagnoses     Nausea    -  1    Polyarthralgia        Abdominal pain, generalized        Tourette's        Mobile cecum        Muscle strain of left lower extremity, initial encounter        PTSD (post-traumatic stress disorder)           Follow-ups after your visit        Additional Services     CARE COORDINATION REFERRAL       Services are provided by a Care Coordinator for people with complex needs such as: medical, social, or financial troubles.  The Care Coordinator works with the patient and their Primary Care Provider to determine health goals, obtain resources, achieve outcomes, and develop care plans that help coordinate the patient's care.     Reason for Referral: Complex Medical Concerns/Education: Chronic diagnosis  and Financial Support: Insurance    Additional pertinent details:  Needs testing performed at [place], may need assistance with coordination    Clinical Staff have discussed the Care Coordination Referral with the patient and/or caregiver: yes                  Your next 10 appointments already scheduled     Apr 05, 2018  1:00 PM CDT   New Sleep Patient with Kimo Gaston PsyD   [facility] ([facility])    [address] [place] [id number] [phone]            Apr 12, 2018  3:30 PM CDT   (Arrive by 3:15 PM)   NEW ARRHYTHMIA with Cornell Sosa MD   [facility] ([facility])    [address] [place] [address]  [address] [place] [address] [phone]            Apr 17, 2018 11:00 AM CDT   Return Visit with Austen Marquez MD   [facility]  ([facility])    [address] [address]   [phone]            May 01, 2018 10:45 AM CDT   Return Visit with Cata Hurst NP   [facility] ([facility])    [address]  [address]  [facility] [address]   [phone]            May 29, 2018  3:00 PM CDT   (Arrive by 2:45 PM)   Return Botox with Frederick Bender MD   [facility] ([facility])    [address] [facility] [address]  [address] [facility] [id number] [phone]            Aug 21, 2018 11:00 AM CDT   (Arrive by 10:45 AM)   Return Botox with Frederick Bender MD   [facility] ([facility])    [address]  [address] [facility] [id number] [phone]              Who to contact     If you have questions or need follow up information about today's clinic visit or your schedule please contact [facility] directly at [phone].  Normal or non-critical lab and imaging results will be communicated to you by MyChart, letter or phone within 4 business days after the clinic has received the results. If you do not hear from us within 7 days, please contact the clinic through [facility] or phone. If you have a critical or abnormal lab result, we will notify you by phone as soon as possible.  Submit refill requests through [employer] or call your pharmacy and they will forward the refill request to us. Please allow 3 business days for your refill to be completed.          Additional Information About Your Visit        [employer] Information     [facility] gives you secure access to your electronic health record. If you see a primary care provider, you can also send messages to your care team and make appointments. If you have questions, please call your primary care clinic.  If you do not have a primary care provider, please  call [phone] and they will assist you.        Care EveryWhere ID     This is your Care EveryWhere ID. This could be used by other organizations to access your [facility] medical records  [id number]        Your Vitals Were     Pulse Temperature Pulse Oximetry BMI (Body Mass Index)          118 97.5  F (36.4  C) (Oral) 100% 27.16 kg/m2         Blood Pressure from Last 3 Encounters:   03/28/18 120/74   03/03/18 119/84   02/28/18 128/86    Weight from Last 3 Encounters:   03/28/18 149 lb 11.2 oz (67.9 kg)   03/03/18 150 lb (68 kg)   02/28/18 150 lb (68 kg)              We Performed the Following     CARE COORDINATION REFERRAL          Today's Medication Changes          These changes are accurate as of 3/28/18  6:18 PM.  If you have any questions, ask your nurse or doctor.               Start taking these medicines.        Dose/Directions    ondansetron 8 MG tablet   Commonly known as:  ZOFRAN   Used for:  Nausea   Started by:  Sonja Abreu APRN CNP        Dose:  8 mg   Take 1 tablet (8 mg) by mouth every 8 hours as needed for nausea   Quantity:  20 tablet   Refills:  1            Where to get your medicines      These medications were sent to [facility] [place] [address]     Phone:  [phone]     diclofenac 1 % Gel topical gel    hyoscyamine 0.125 MG tablet    ondansetron 8 MG tablet                Primary Care Provider Office Phone # Fax #    EVI Cardona [phone]       [address] [facility] [address]  [facility] [address]        Equal Access to Services     NABIL GALEANO : Hadcarlin Santiago, channing hurtado, qamike armando. So [facility] [phone].    ATENCIÓN: Si habla español, tiene a [place] disposición servicios gratuitos de asistencia lingüística. Llame al [phone].    We comply with applicable federal civil  rights laws and [place] laws. We do not discriminate on the basis of race, color, national origin, age, disability, sex, sexual orientation, or gender identity.            Thank you!     Thank you for choosing [facility]  for your care. Our goal is always to provide you with excellent care. Hearing back from our patients is one way we can continue to improve our services. Please take a few minutes to complete the written survey that you may receive in the mail after your visit with us. Thank you!             Your Updated Medication List - Protect others around you: Learn how to safely use, store and throw away your medicines at www.disposemymeds.org.          This list is accurate as of 3/28/18  6:18 PM.  Always use your most recent med list.                   Brand Name Dispense Instructions for use Diagnosis    albuterol 108 (90 BASE) MCG/ACT Inhaler    PROAIR HFA/PROVENTIL HFA/VENTOLIN HFA    1 Inhaler    Inhale 2 puffs into the lungs every 6 hours as needed for shortness of breath / dyspnea or wheezing    Atypical chest pain       amitriptyline 50 MG tablet    ELAVIL    30 tablet    Take 1 tablet (50 mg) by mouth At Bedtime    Abdominal pain, generalized, Insomnia due to medical condition       apremilast 30 MG tablet    OTEZLA     Take 1 tablet (30 mg) by mouth 2 times daily    Behcet's disease (H)       ASPIRIN CHILDRENS 81 MG chewable tablet   Generic drug:  aspirin      Take 81 mg by mouth as needed        aspirin-acetaminophen-caffeine 250-250-65 MG per tablet    EXCEDRIN MIGRAINE     Take 1 tablet by mouth every 6 hours as needed for headaches        benzocaine 20 % Gel    TOPICALE XTRA    30 g    Apply as needed locally to mouth or nasal ulcers for pain; 4 times daily as needed    Behcet's disease (H)       betamethasone valerate 0.1 % cream    VALISONE     Apply topically 2 times daily        * BOTOX IJ      Inject 165 Units as directed once Lot#: /C3 Exp: 10/2020        * BOTOX IJ       Inject 165 Units as directed once Lot # /C3  Exp:  10/2020        * botulinum toxin type A 100 UNITS injection    BOTOX    200 Units    Inject 200 Units into the muscle every 3 months    Cervical dystonia       * BOTOX IJ      Inject 165 Units into the muscle once Lot /C3 Exp 06/2020        carbamide peroxide 6.5 % otic solution    DEBROX     5 drops 2 times daily        clobetasol 0.05 % cream    TEMOVATE    60 g    Apply topically 2 times daily    Folliculitis       colchicine 0.6 MG tablet     270 tablet    TAKE 2 TABLETS BY MOUTH EVERY MORNING AND 1 TABLET EVERY EVENING.    Behcet's disease (H)       COMPOUNDED NON-CONTROLLED SUBSTANCE - PHARMACY TO MIX COMPOUNDED MEDICATION    CMPD RX    30 capsule    Take one capsule in the morning    Fibromyalgia       cyproheptadine 4 MG tablet    PERIACTIN    30 tablet    Take 1 tablet (4 mg) by mouth At Bedtime For nightmares    Nightmares       diclofenac 1 % Gel topical gel    VOLTAREN    100 g    Apply 2-4 grams to affected area(s) up to 4 times per day as needed. This is an anti-inflammatory medication.    Polyarthralgia       dicyclomine 20 MG tablet    BENTYL    120 tablet    Take 1 tablet (20 mg) by mouth 4 times daily as needed    Abdominal cramping       diltiazem 2% in PLO cream (FV COMPOUNDED) 2% Gel     30 g    Apply small pea size amount three times daily to anus until pain is gone.    Anal fissure       diphenhydrAMINE 25 MG tablet    BENADRYL    30 tablet    Take 1 tablet (25 mg) by mouth every 8 hours as needed for allergies        EPINEPHrine 0.3 MG/0.3ML injection 2-pack    EPIPEN 2-EAMON    0.6 mL    Inject 0.3 mLs (0.3 mg) into the muscle as needed for anaphylaxis    Hx of bee sting allergy       * guanFACINE 1 MG tablet    TENEX    270 tablet    Take 3 tablets (3 mg) by mouth 2 times daily    Tic       * guanFACINE 1 MG tablet    TENEX    180 tablet    Take 3 tablets (3 mg) by mouth twice daily morning and evening.    Tic       hydrOXYzine  25 MG tablet    ATARAX    90 tablet    Take 1-2 tablets (25-50 mg) by mouth every 6 hours as needed for anxiety    TAHIR (generalized anxiety disorder)       hyoscyamine 0.125 MG tablet    ANASPAZ/LEVSIN    40 tablet    Take 1-2 tablets (125-250 mcg) by mouth every 4 hours as needed for cramping    Abdominal pain, generalized       hypromellose 0.3 % Soln ophthalmic solution    GENTEAL     1 drop every hour as needed for dry eyes        LACTAID PO      Take by mouth daily        lactulose 20 GM/30ML Soln     300 mL    Take 30 mLs by mouth 3 times daily as needed    Constipation, unspecified constipation type       lidocaine 2 % topical gel    XYLOCAINE     Apply 1 Tube topically daily Reported on 4/21/2017        lidocaine visc 2% 2.5mL/5mL & maalox/mylanta w/ simeth 2.5mL/5mL & diphenhydrAMINE 5mg/5mL Susp suspension    [facility] Mouthwash [facility]    1 Bottle    Swish and swallow 10 mLs in mouth every 6 hours as needed for mouth sores    Behcet's syndrome (H)       linaclotide 145 MCG capsule    LINZESS    90 capsule    Take 1 capsule (145 mcg) by mouth every morning (before breakfast)    Chronic idiopathic constipation       LORazepam 1 MG tablet    ATIVAN    60 tablet    Take 0.5 tablets (0.5 mg) by mouth 2 times daily as needed for other (atypical chest pain) Do not operate a vehicle after taking this medication    Chronic abdominal pain       lubiprostone 24 MCG capsule    AMITIZA    30 capsule    Take 1 capsule (24 mcg) by mouth 2 times daily (with meals)    Chronic idiopathic constipation       omeprazole 40 MG capsule    priLOSEC    90 capsule    Take 1 capsule (40 mg) by mouth daily    Gastroesophageal reflux disease, esophagitis presence not specified       ondansetron 8 MG ODT tab    ZOFRAN-ODT    60 tablet    Take 1 tablet (8 mg) by mouth every 8 hours as needed for nausea    Non-intractable vomiting with nausea, unspecified vomiting type, Hematemesis, presence of nausea not specified       ondansetron 8 MG  tablet    ZOFRAN    20 tablet    Take 1 tablet (8 mg) by mouth every 8 hours as needed for nausea    Nausea       SPRINTEC 28 0.25-35 MG-MCG per tablet   Generic drug:  norgestimate-ethinyl estradiol     84 tablet    TAKE 1 TABLET BY MOUTH ONCE DAILY.    General counseling for prescription of oral contraceptives       sucralfate 1 GM/10ML suspension    CARAFATE    1200 mL    Take 10 mLs (1 g) by mouth 4 times daily    Bile reflux gastritis, Nausea       triamcinolone 0.1 % cream    KENALOG    15 g    Apply sparingly to oral ulcers three times daily for 14 days as needed.    Behcet's syndrome (H)       * Notice:  This list has 6 medication(s) that are the same as other medications prescribed for you. Read the directions carefully, and ask your doctor or other care provider to review them with you.

## 2018-03-28 NOTE — TELEPHONE ENCOUNTER
Patient called clinic to state that there was a round nicolas behind her knee when she woke up this morning. It is painful and radiates down to the middle of her calf. Per patient, is a little swollen, red, and warm to touch. Has bruise on front of her shin. Patient stated that she had an episode of dizziness this morning 1000. In the shower blacked our for a little bit.     Per patient, she has heart condition (Behcet's disease)-- has been hospitalized, sees cardiology. Common symptoms include rapid heart beat, gets dizzy/hot.     Patient reports history of knee surgery in 2013. Denies taking blood thinners currently.    Writer recommended that patient be seen by provider. Patient in agreement. Appointment scheduled for 1745 with Sonja Abreu.    Reviewed that patient would need to go to ED if she develops sudden SOB, chest pain, and/or difficulty breathing.    Criselda Sanabria RN  [facility]

## 2018-03-29 ENCOUNTER — PATIENT OUTREACH (OUTPATIENT)
Dept: CARE COORDINATION | Facility: CLINIC | Age: 23
End: 2018-03-29

## 2018-03-29 DIAGNOSIS — M79.7 FIBROMYALGIA: Primary | ICD-10-CM

## 2018-03-29 NOTE — PROGRESS NOTES
Clinic Care Coordination Contact  Care Team Conversations    LUCINA BAH       [id number]               : 1995  F       [address]                             PCP: [id number], ALAYNA CLA* [place] [address]                                CTR: [facility]            Name: LUCINA BAH Date: 3/28/2018       Home: [phone]          Payor:              MEDICA     Plan:               MEDICA CHOICE     Sponsor Code:       [id number]     Subscriber ID:      [id number]     Subscriber Name:    MALGORZATA BAH     Subscriber Address: [facility] [address]                          [place] [address]          Effective From:     01/01/15     Effective To:            Group Number:       [id number]     Group Name  : Not Available               Date       Provider                   Department   Center            3/28/2018  SONJA LYON  [facility]         [facility]          Order Date:3/28/2018     Ordering User:SONJA ABREU [[id number]]     Encounter Provider:Sonja Abreu APRN CNP [[id number]]     Authorizing Provider: Sonja Abreu APRN CNP [[id number]]     Department: FAMILY PRACTICE[[id number]]          Ordering Provider NPI: [id number]  Sonja Abreu     [facility]~[address] [place] [address], [address]      [address]     Phone: [phone]                    Procedure Requested       9036.110 CARE COORDINATION REFERRAL            [#[id number]]         Priority: Routine  Class: Local Print         Comment:Services are provided by a Care Coordinator for people with complex                  needs such as: medical, social, or financial troubles.  The Care                  Coordinator works with the patient and their Primary Care Provider                  to determine health goals, obtain resources, achieve outcomes, and                  develop care plans that help coordinate the patient's care.                                     Reason for Referral:  Complex Medical Concerns/Education: Chronic                   diagnosis  and Financial Support: Insurance                                    Additional pertinent details:  Needs testing performed at [place],                   may need assistance with coordination                                    Clinical Staff have discussed the Care Coordination Referral with                   the patient and/or caregiver: yes       Associated Diagnoses         F95.2 Tourette's         Q43.3 Mobile cecum               LUCINA BAH       [id number]               : 1995  F      [address]                             PCP: CYNTHIA LYON CLA*     [place] [address]                                CTR: [facility]

## 2018-03-29 NOTE — LETTER
[place] CARE COORDINATION    March 29, 2018    Samara Oropeza  [address]   [place] [address]      Dear Samara,    I am a clinic care coordinator who works with EVI Cardona CNP at [facility]. I wanted to thank you for spending the time to talk with me.  I wanted to introduce myself and provide you with my contact information so that you can call me with questions or concerns about your health care. Below is a description of clinic care coordination and how I can further assist you.     The clinic care coordinator is a registered nurse and/or  who understand the health care system. The goal of clinic care coordination is to help you manage your health and improve access to the [facility] system in the most efficient manner. The registered nurse can assist you in meeting your health care goals by providing education, coordinating services, and strengthening the communication among your providers. The  can assist you with financial, behavioral, psychosocial, chemical dependency, counseling, and/or psychiatric resources.    Please feel free to contact me at [phone], with any questions or concerns. We at [facility] are focused on providing you with the highest-quality healthcare experience possible and that all starts with you.     Sincerely,     Teresa Cam    Enclosed: I have enclosed a copy of a 24 Hour Access Plan. This has helpful phone numbers for you to call when needed. Please keep this in an easy to access place to use as needed.

## 2018-03-29 NOTE — LETTER
Health Care Home - Access Care Plan    About Me  Patient Name:  Samara Bah    YOB: 1995  Age:                             22 year old   Codie MRN:            [id number] Telephone Information:     Home Phone [phone]   Mobile [phone]       Address:    [address]   [place] [address] Email address:  [email or web address]      Emergency Contact(s)  Name Relationship Lgl Grd Work Phone Home Phone Mobile Phone   1. MARIXA CLARKE* Mother Yes  [phone]   2. MARY BAH* Father   [phone]   3. HOLLY CLARKE* Step parent   [phone]   4. KENIA PERRY* Grandparent   [phone]              Health Maintenance: Routine Health maintenance Reviewed: Due/Overdue     My Access Plan  Medical Emergency 911   Questions or concerns during clinic hours Primary Clinic Line, I will call the clinic directly: [facility] [phone]   24 Hour Appointment Line [phone] or  [phone] [place] [phone]) (toll free)   24 Hour Nurse Line [phone] (toll free)   Questions or concerns outside clinic hours 24 Hour Appointment Line, I will call the after-hours on-call line:   [facility] [phone] or [phone]) (toll-free)   Preferred Ur[facility] Care [facility], [phone]   Preferred [facility]  [phone]   Preferred Pharmacy [facility] [address] [place] [facility] [address]     Behavioral Health Crisis Line The National Suicide Prevention Lifeline at 1-690.689.5018 or 910     My Care Team Members  Patient Care Team       Relationship Specialty Notifications Start End    Sonja Abreu APRN CNP PCP - General Nurse Practitioner  11/3/17     Phone: [phone] Fax: [phone]         [address] [facility] [address] [facility] [address]    Jennifer Acevedo MD MD Orthopedics  7/16/13     Phone: [phone] Fax:  [phone]         [address] [facility] [address]    Lilliana Miramontes APRN CNP Nurse Practitioner Nurse Practitioner  5/11/15     Phone: [phone] Fax: [phone]         [address] [place] [address]    Shagufta Austin, RN Clinic Care Coordinator Gastroenterology Abnormal results only, Admissions 6/16/15     Comment:  phone  pager     Phone: [phone]         Cristy Russell, RN Nurse Coordinator Neurology Admissions 6/18/15     Comment:  General Neurology    Phone: [phone] Pager: [phone]        Carrie Ngo, RN Nurse Coordinator Neurology Admissions 7/28/15     Phone: [phone] Pager: [phone]        Austen Marquez MD MD Rheumatology Admissions 9/16/15     Phone: [phone] Fax: [phone]         [address] [place] [address]    Umer Heaton MD MD Ophthalmology  1/11/16     Phone: [phone] Fax: [phone]         [address] [place] [address]    Suzy Harman MD MD Family Medicine - Sports Medicine  1/19/16     Phone: [phone] Fax: [phone]         [address] [place] [address]    Esau Elliott MD MD Dermatology  9/14/16     Phone: [phone] Fax: [phone]         [address] [place] [address]    Frederick Bender MD MD Physical Medicine and Rehab  4/4/17     Phone: [phone] Fax: [phone]         [address] [address]    Vidya Bryson, RN Clinic Care Coordinator Physical Medicine and Rehab  4/4/17     Phone: [phone] Pager: [phone] Fax: [phone]        [address] [address]    Marcelle Richardson, PT Physical Therapist Physical Medicine and Rehab  4/4/17     Phone: [phone] Fax: [phone]         [address] [address]    Cata Hurst, NP Nurse Practitioner Nurse  Practitioner Psych/Mental Health  6/27/17     Phone: [phone] Fax: [phone]         [facility] [address] [facility] [address]    HUSSEIN Lynn MD MD Cardiology  2/27/18     Phone: [phone] Fax: [phone]         [address] [facility] [address] [facility] [address]    Calli Cam, RN Lead Care Coordinator Primary Care - CC Admissions 3/29/18 3/29/18    Comment:  [phone]    Phone: [phone] Fax: [phone]            My Medical and Care Information  Problem List   Patient Active Problem List   Diagnosis     Tics - Tourette syndrome     IBS (irritable bowel syndrome)     Seasonal allergic rhinitis     TAHIR (generalized anxiety disorder)     Knee pain     Concussion     Milk protein intolerance     Headaches due to old head injury     Behcet's disease (H)     Migraine     Spell of shaking     On colchicine therapy     Palpitations     Fibromyalgia     Rheumatoid arthritis of multiple sites without rheumatoid factor (H)     Raynaud's disease without gangrene     Chronic abdominal pain     Patellofemoral instability     PTSD (post-traumatic stress disorder)     Cervical dystonia     Acute left ankle pain     Cervical pain     Major depressive disorder, recurrent episode, moderate (H)     Chronic pain syndrome     Convulsions, unspecified convulsion type (H)     Transient alteration of awareness     Vitamin D deficiency     Mobile cecum     Spells of decreased attentiveness      Current Medications and Allergies:  See printed Medication Report

## 2018-03-29 NOTE — PROGRESS NOTES
Clinic Care Coordination Contact  Care Team Conversations    Reviewed with patient. Patient wonders if her medical insurance will cover any hotel costs as she needs to stay in [place] to attend therapy and appointments at [facility]. Needs to stay 5 1/2 weeks. Getting second opinion for abdominal pain, physical rehab and pelvic floor therapy. Advised we have those services available here but she declines and wants to pursue evaluation at [place]. Advised she should check insurance coverage. She agrees to do so no further needs from patient at this time. Will send out RN CC contact information but close to care coordination at this time.     Teresa Cam R.N.  Clinic Care Coordinator  [facility]  [phone]

## 2018-04-05 ENCOUNTER — OFFICE VISIT (OUTPATIENT)
Dept: SLEEP MEDICINE | Facility: CLINIC | Age: 23
End: 2018-04-05
Attending: NURSE PRACTITIONER
Payer: COMMERCIAL

## 2018-04-05 VITALS
OXYGEN SATURATION: 99 % | HEART RATE: 83 BPM | SYSTOLIC BLOOD PRESSURE: 116 MMHG | HEIGHT: 62 IN | DIASTOLIC BLOOD PRESSURE: 80 MMHG | BODY MASS INDEX: 27.42 KG/M2 | WEIGHT: 149 LBS

## 2018-04-05 DIAGNOSIS — R29.818 SUSPECTED SLEEP APNEA: ICD-10-CM

## 2018-04-05 DIAGNOSIS — G25.81 RESTLESS LEGS SYNDROME (RLS): ICD-10-CM

## 2018-04-05 DIAGNOSIS — G47.8 SLEEP PARALYSIS: ICD-10-CM

## 2018-04-05 DIAGNOSIS — G47.00 INSOMNIA, UNSPECIFIED TYPE: Primary | ICD-10-CM

## 2018-04-05 DIAGNOSIS — G47.19 EXCESSIVE DAYTIME SLEEPINESS: ICD-10-CM

## 2018-04-05 DIAGNOSIS — F51.5 NIGHTMARE DISORDER: ICD-10-CM

## 2018-04-05 PROCEDURE — 90791 PSYCH DIAGNOSTIC EVALUATION: CPT | Performed by: PSYCHOLOGIST

## 2018-04-05 ASSESSMENT — PATIENT HEALTH QUESTIONNAIRE - PHQ9: 5. POOR APPETITE OR OVEREATING: NEARLY EVERY DAY

## 2018-04-05 ASSESSMENT — ANXIETY QUESTIONNAIRES
1. FEELING NERVOUS, ANXIOUS, OR ON EDGE: MORE THAN HALF THE DAYS
5. BEING SO RESTLESS THAT IT IS HARD TO SIT STILL: MORE THAN HALF THE DAYS
3. WORRYING TOO MUCH ABOUT DIFFERENT THINGS: SEVERAL DAYS
GAD7 TOTAL SCORE: 12
6. BECOMING EASILY ANNOYED OR IRRITABLE: SEVERAL DAYS
2. NOT BEING ABLE TO STOP OR CONTROL WORRYING: MORE THAN HALF THE DAYS
7. FEELING AFRAID AS IF SOMETHING AWFUL MIGHT HAPPEN: SEVERAL DAYS
IF YOU CHECKED OFF ANY PROBLEMS ON THIS QUESTIONNAIRE, HOW DIFFICULT HAVE THESE PROBLEMS MADE IT FOR YOU TO DO YOUR WORK, TAKE CARE OF THINGS AT HOME, OR GET ALONG WITH OTHER PEOPLE: SOMEWHAT DIFFICULT

## 2018-04-05 NOTE — Clinical Note
Sleep study and hypersomnia workup ordered.  Patient will follow-up with my colleague Dr. Dashawn Colunga for review of sleep study and initiation ofany treatment for intrinsic sleep disorders if indicated. Sleep Psychology to followup to address behavioral factors likely also contributing to insomnia.

## 2018-04-05 NOTE — PATIENT INSTRUCTIONS
We will proceed with completion of two weeks of actigraphy to get a better sense of your total nightly sleep over the course of nights.  This will be followed by an in lab sleep study to evaluation for possible sleep apnea, parasomnias, REM behavior.  If you have gotten a sufficient amount of sleep, you may also be complete a nap study during the next day to evaluate your excessive daytime sleepiness.    In the meantime I want you to keep a consistent sleep schedule betwenn 11 PM - 7 AM.  Avoid naps unless you feel drowsy and are planning to drive or use equipment.  In these cases a short nap is encouraged to improve alertness and avoid risk for injury or accident that could be caused by sleepiness.    You should take your prescription medication  Cyproheptadine for your nightmares as prescribed.  I will see you in followup after your sleep study and consultation with my colleague sleep medicine physician Dr. Dashawn Colunga.  Our work will focus on behavioral management of your insomnia and behavioral treatment that can improve nightmares.    Lastly. I encourage you to continue to follow closely with your psychiatrist for treatment of major depressive disorder and generalized anxiety.  I would also recommend you consider seeing a psychologist or therapist.  Your psychiatrist can provide you referral information about this.    Your BMI is Body mass index is 27.25 kg/(m^2).  Weight management is a personal decision.  If you are interested in exploring weight loss strategies, the following discussion covers the approaches that may be successful. Body mass index (BMI) is one way to tell whether you are at a healthy weight, overweight, or obese. It measures your weight in relation to your height.  A BMI of 18.5 to 24.9 is in the healthy range. A person with a BMI of 25 to 29.9 is considered overweight, and someone with a BMI of 30 or greater is considered obese. More than two-thirds of American adults are considered  overweight or obese.  Being overweight or obese increases the risk for further weight gain. Excess weight may lead to heart disease and diabetes.  Creating and following plans for healthy eating and physical activity may help you improve your health.  Weight control is part of healthy lifestyle and includes exercise, emotional health, and healthy eating habits. Careful eating habits lifelong are the mainstay of weight control. Though there are significant health benefits from weight loss, long-term weight loss with diet alone may be very difficult to achieve- studies show long-term success with dietary management in less than 10% of people. Attaining a healthy weight may be especially difficult to achieve in those with severe obesity. In some cases, medications, devices and surgical management might be considered.  What can you do?  If you are overweight or obese and are interested in methods for weight loss, you should discuss this with your provider.     Consider reducing daily calorie intake by 500 calories.     Keep a food journal.     Avoiding skipping meals, consider cutting portions instead.    Diet combined with exercise helps maintain muscle while optimizing fat loss. Strength training is particularly important for building and maintaining muscle mass. Exercise helps reduce stress, increase energy, and improves fitness. Increasing exercise without diet control, however, may not burn enough calories to loose weight.       Start walking three days a week 10-20 minutes at a time    Work towards walking thirty minutes five days a week     Eventually, increase the speed of your walking for 1-2 minutes at time    In addition, we recommend that you review healthy lifestyles and methods for weight loss available through the National Institutes of Health patient information sites:  http://win.niddk.nih.gov/publications/index.htm    And look into health and wellness programs that may be available through your health  insurance provider, employer, local community center, or [employer].    Weight management plan: Patient was referred to their PCP to discuss a diet and exercise plan.

## 2018-04-05 NOTE — MR AVS SNAPSHOT
After Visit Summary   4/5/2018    Samara Oropeza    MRN: [id number]           Patient Information     Date Of Birth          1995        Visit Information        Provider Department      4/5/2018 1:00 PM Kimo Gaston PsyD [facility] [place]        Today's Diagnoses     Restless legs syndrome (RLS)    -  1    Sleep disturbance        Excessive daytime sleepiness        Suspected sleep apnea        Sleep paralysis          Care Instructions    We will proceed with completion of two weeks of actigraphy to get a better sense of your total nightly sleep over the course of nights.  This will be followed by an in lab sleep study to evaluation for possible sleep apnea, parasomnias, REM behavior.  If you have gotten a sufficient amount of sleep, you may also be complete a nap study during the next day to evaluate your excessive daytime sleepiness.    In the meantime I want you to keep a consistent sleep schedule betwenn 11 PM - 7 AM.  Avoid naps unless you feel drowsy and are planning to drive or use equipment.  In these cases a short nap is encouraged to improve alertness and avoid risk for injury or accident that could be caused by sleepiness.    You should take your prescription medication  Cyproheptadine for your nightmares as prescribed.  I will see you in followup after your sleep study and consultation with my colleague sleep medicine physician Dr. Dashawn Colunga.  Our work will focus on behavioral management of your insomnia and behavioral treatment that can improve nightmares.    Lastly. I encourage you to continue to follow closely with your psychiatrist for treatment of major depressive disorder and generalized anxiety.  I would also recommend you consider seeing a psychologist or therapist.  Your psychiatrist can provide you referral information about this.    Your BMI is Body mass index is 27.25 kg/(m^2).  Weight management is a personal decision.  If you are  interested in exploring weight loss strategies, the following discussion covers the approaches that may be successful. Body mass index (BMI) is one way to tell whether you are at a healthy weight, overweight, or obese. It measures your weight in relation to your height.  A BMI of 18.5 to 24.9 is in the healthy range. A person with a BMI of 25 to 29.9 is considered overweight, and someone with a BMI of 30 or greater is considered obese. More than two-thirds of American adults are considered overweight or obese.  Being overweight or obese increases the risk for further weight gain. Excess weight may lead to heart disease and diabetes.  Creating and following plans for healthy eating and physical activity may help you improve your health.  Weight control is part of healthy lifestyle and includes exercise, emotional health, and healthy eating habits. Careful eating habits lifelong are the mainstay of weight control. Though there are significant health benefits from weight loss, long-term weight loss with diet alone may be very difficult to achieve- studies show long-term success with dietary management in less than 10% of people. Attaining a healthy weight may be especially difficult to achieve in those with severe obesity. In some cases, medications, devices and surgical management might be considered.  What can you do?  If you are overweight or obese and are interested in methods for weight loss, you should discuss this with your provider.     Consider reducing daily calorie intake by 500 calories.     Keep a food journal.     Avoiding skipping meals, consider cutting portions instead.    Diet combined with exercise helps maintain muscle while optimizing fat loss. Strength training is particularly important for building and maintaining muscle mass. Exercise helps reduce stress, increase energy, and improves fitness. Increasing exercise without diet control, however, may not burn enough calories to loose weight.        Start walking three days a week 10-20 minutes at a time    Work towards walking thirty minutes five days a week     Eventually, increase the speed of your walking for 1-2 minutes at time    In addition, we recommend that you review healthy lifestyles and methods for weight loss available through the National Institutes of Health patient information sites:  http://win.niddk.nih.gov/publications/index.htm    And look into health and wellness programs that may be available through your health insurance provider, employer, local community center, or [employer].    Weight management plan: Patient was referred to their PCP to discuss a diet and exercise plan.              Follow-ups after your visit        Additional Services     SLEEP EVALUATION & MANAGEMENT REFERRAL - ADULT -[facility] [place]  [phone] (Age 15 and up) (Schedule with Dashawn Colunga MD)       Please be aware that coverage of these services is subject to the terms and limitations of your health insurance plan.  Call member services at your health plan with any benefit or coverage questions.      Please bring the following to your appointment:    >>   List of current medications   >>   This referral request   >>   Any documents/labs given to you for this referral                      Follow-up notes from your care team     Return in about 2 months (around 6/5/2018) for Insomnia Follow up with Dr. Gaston after sleep studies.      Your next 10 appointments already scheduled     Apr 12, 2018  3:30 PM CDT   (Arrive by 3:15 PM)   NEW ARRHYTHMIA with Cornell Sosa MD   [facility] ([facility]    [address]  [address]  [place] [address] [phone]            Apr 13, 2018  3:00 PM CDT   Actigraphy Pickup with  BED 5   [facility] ([facility])    [address]  [place] [address] [phone]             Apr 17, 2018 11:00 AM CDT   Return Visit with Austen Marquez MD   [facility]    [address] [address] [phone]            Apr 30, 2018  8:00 PM CDT   PSG Split with BK BED 1   [facility]    [address]  [place] [address]   [phone]            May 01, 2018  7:30 AM CDT   LAB with BK LAB   [facility]    [address] [address] [phone]           Please do not eat 10-12 hours before your appointment if you are coming in fasting for labs on lipids, cholesterol, or glucose (sugar). This does not apply to pregnant women. Water, hot tea and black coffee (with nothing added) are okay. Do not drink other fluids, diet soda or chew gum.            May 01, 2018  8:00 AM CDT   Actigraphy Drop Off with SUJIT SC DME   [facility]    [address]  [place] [address]   [phone]            May 01, 2018 10:45 AM CDT   Return Visit with Cata Hurst NP   [facility]    [address]  [place] [address] [phone]            May 15, 2018 11:00 AM CDT   New Sleep Patient with Dashawn Colunga MD   [facility]    [address]  [place] [address]   [phone]            May 24, 2018  2:30 PM CDT   Return Sleep Patient with Kimo Gaston PsyD   [facility]    [address]  [place] [address]   [phone]            May 29, 2018  3:00 PM CDT   (Arrive by 2:45 PM)   Return Botox with Frederick Villaseñor  "MD Napoleon   [facility] ([facility])    [address] [facility] [address] [phone]              Future tests that were ordered for you today     Open Future Orders        Priority Expected Expires Ordered    Comprehensive Sleep Study Routine  10/2/2018 4/5/2018    SLEEP EVALUATION & MANAGEMENT REFERRAL - ADULT -[facility] [place]  [phone] (Age 15 and up) (Schedule with Dashawn Colunga MD) Routine  4/5/2019 4/5/2018            Who to contact     If you have questions or need follow up information about today's clinic visit or your schedule please contact [facility] [place] directly at [phone].  Normal or non-critical lab and imaging results will be communicated to you by MyChart, letter or phone within 4 business days after the clinic has received the results. If you do not hear from us within 7 days, please contact the clinic through MyChart or phone. If you have a critical or abnormal lab result, we will notify you by phone as soon as possible.  Submit refill requests through [employer] or call your pharmacy and they will forward the refill request to us. Please allow 3 business days for your refill to be completed.          Additional Information About Your Visit        [employer] Information     [employer] gives you secure access to your electronic health record. If you see a primary care provider, you can also send messages to your care team and make appointments. If you have questions, please call your primary care clinic.  If you do not have a primary care provider, please call [phone] and they will assist you.        Care EveryWhere ID     This is your Care EveryWhere ID. This could be used by other organizations to access your [facility] medical records  [id number]        Your Vitals Were     Pulse Height Pulse Oximetry BMI (Body Mass Index)          83 1.575 m (5' 2\") 99% " 27.25 kg/m2         Blood Pressure from Last 3 Encounters:   04/05/18 116/80   03/28/18 120/74   03/03/18 119/84    Weight from Last 3 Encounters:   04/05/18 67.6 kg (149 lb)   03/28/18 67.9 kg (149 lb 11.2 oz)   03/03/18 68 kg (150 lb)              We Performed the Following     SLEEP EVALUATION & MANAGEMENT REFERRAL - ADULT -[facility]  [phone] (Age 18 and up) (Or [place])     SLEEP EVALUATION & MANAGEMENT REFERRAL - [facility] [place] [facility]  [phone] (Age 2 and up)     Urine Drugs of Abuse Screen Panel 13        Primary Care Provider Office Phone # Fax #    Sonja EVI Laguerre [facility] [phone]       [address] [facility] [address]  [place] [address]        Equal Access to Services     NABIL GALEANO : Hadii aad ku hadasho Soomaali, waaxda luqadaha, qaybta kaalmada adeegyada, waxay verónicain haysophian maddie rodriguez . So [facility] [phone].    ATENCIÓN: Si habla español, tiene a livingston disposición servicios gratuitos de asistencia lingüística. Jesus al [phone].    We comply with applicable federal civil rights laws and [place] laws. We do not discriminate on the basis of race, color, national origin, age, disability, sex, sexual orientation, or gender identity.            Thank you!     Thank you for choosing [facility]  for your care. Our goal is always to provide you with excellent care. Hearing back from our patients is one way we can continue to improve our services. Please take a few minutes to complete the written survey that you may receive in the mail after your visit with us. Thank you!             Your Updated Medication List - Protect others around you: Learn how to safely use, store and throw away your medicines at www.disposemymeds.org.          This list is accurate as of 4/5/18  2:34 PM.  Always use your most recent med list.                   Brand Name Dispense Instructions for  use Diagnosis    albuterol 108 (90 BASE) MCG/ACT Inhaler    PROAIR HFA/PROVENTIL HFA/VENTOLIN HFA    1 Inhaler    Inhale 2 puffs into the lungs every 6 hours as needed for shortness of breath / dyspnea or wheezing    Atypical chest pain       amitriptyline 50 MG tablet    ELAVIL    30 tablet    Take 1 tablet (50 mg) by mouth At Bedtime    Abdominal pain, generalized, Insomnia due to medical condition       apremilast 30 MG tablet    OTEZLA     Take 1 tablet (30 mg) by mouth 2 times daily    Behcet's disease (H)       ASPIRIN CHILDRENS 81 MG chewable tablet   Generic drug:  aspirin      Take 81 mg by mouth as needed        aspirin-acetaminophen-caffeine 250-250-65 MG per tablet    EXCEDRIN MIGRAINE     Take 1 tablet by mouth every 6 hours as needed for headaches        benzocaine 20 % Gel    TOPICALE XTRA    30 g    Apply as needed locally to mouth or nasal ulcers for pain; 4 times daily as needed    Behcet's disease (H)       betamethasone valerate 0.1 % cream    VALISONE     Apply topically 2 times daily        * BOTOX IJ      Inject 165 Units as directed once Lot#: /C3 Exp: 10/2020        * BOTOX IJ      Inject 165 Units as directed once Lot # /C3  Exp:  10/2020        * botulinum toxin type A 100 UNITS injection    BOTOX    200 Units    Inject 200 Units into the muscle every 3 months    Cervical dystonia       * BOTOX IJ      Inject 165 Units into the muscle once Lot /C3 Exp 06/2020        carbamide peroxide 6.5 % otic solution    DEBROX     5 drops 2 times daily        clobetasol 0.05 % cream    TEMOVATE    60 g    Apply topically 2 times daily    Folliculitis       colchicine 0.6 MG tablet     270 tablet    TAKE 2 TABLETS BY MOUTH EVERY MORNING AND 1 TABLET EVERY EVENING.    Behcet's disease (H)       COMPOUNDED NON-CONTROLLED SUBSTANCE - PHARMACY TO MIX COMPOUNDED MEDICATION    CMPD RX    30 capsule    Take one capsule in the morning    Fibromyalgia       cyproheptadine 4 MG tablet    PERIACTIN     30 tablet    Take 1 tablet (4 mg) by mouth At Bedtime For nightmares    Nightmares       diclofenac 1 % Gel topical gel    VOLTAREN    100 g    Apply 2-4 grams to affected area(s) up to 4 times per day as needed. This is an anti-inflammatory medication.    Polyarthralgia       dicyclomine 20 MG tablet    BENTYL    120 tablet    Take 1 tablet (20 mg) by mouth 4 times daily as needed    Abdominal cramping       diltiazem 2% in PLO cream (FV COMPOUNDED) 2% Gel     30 g    Apply small pea size amount three times daily to anus until pain is gone.    Anal fissure       diphenhydrAMINE 25 MG tablet    BENADRYL    30 tablet    Take 1 tablet (25 mg) by mouth every 8 hours as needed for allergies        EPINEPHrine 0.3 MG/0.3ML injection 2-pack    EPIPEN 2-EAMON    0.6 mL    Inject 0.3 mLs (0.3 mg) into the muscle as needed for anaphylaxis    Hx of bee sting allergy       * guanFACINE 1 MG tablet    TENEX    270 tablet    Take 3 tablets (3 mg) by mouth 2 times daily    Tic       * guanFACINE 1 MG tablet    TENEX    180 tablet    Take 3 tablets (3 mg) by mouth twice daily morning and evening.    Tic       hydrOXYzine 25 MG tablet    ATARAX    90 tablet    Take 1-2 tablets (25-50 mg) by mouth every 6 hours as needed for anxiety    TAHIR (generalized anxiety disorder)       hyoscyamine 0.125 MG tablet    ANASPAZ/LEVSIN    40 tablet    Take 1-2 tablets (125-250 mcg) by mouth every 4 hours as needed for cramping    Abdominal pain, generalized       hypromellose 0.3 % Soln ophthalmic solution    GENTEAL     1 drop every hour as needed for dry eyes        LACTAID PO      Take by mouth daily        lactulose 20 GM/30ML Soln     300 mL    Take 30 mLs by mouth 3 times daily as needed    Constipation, unspecified constipation type       lidocaine 2 % topical gel    XYLOCAINE     Apply 1 Tube topically daily Reported on 4/21/2017        lidocaine visc 2% 2.5mL/5mL & maalox/mylanta w/ simeth 2.5mL/5mL & diphenhydrAMINE 5mg/5mL Susp  suspension    MAGIC Mouthwash [facility] [place]    1 Bottle    Swish and swallow 10 mLs in mouth every 6 hours as needed for mouth sores    Behcet's syndrome (H)       linaclotide 145 MCG capsule    LINZESS    90 capsule    Take 1 capsule (145 mcg) by mouth every morning (before breakfast)    Chronic idiopathic constipation       LORazepam 1 MG tablet    ATIVAN    60 tablet    Take 0.5 tablets (0.5 mg) by mouth 2 times daily as needed for other (atypical chest pain) Do not operate a vehicle after taking this medication    Chronic abdominal pain       lubiprostone 24 MCG capsule    AMITIZA    30 capsule    Take 1 capsule (24 mcg) by mouth 2 times daily (with meals)    Chronic idiopathic constipation       omeprazole 40 MG capsule    priLOSEC    90 capsule    Take 1 capsule (40 mg) by mouth daily    Gastroesophageal reflux disease, esophagitis presence not specified       ondansetron 8 MG ODT tab    ZOFRAN-ODT    60 tablet    Take 1 tablet (8 mg) by mouth every 8 hours as needed for nausea    Non-intractable vomiting with nausea, unspecified vomiting type, Hematemesis, presence of nausea not specified       ondansetron 8 MG tablet    ZOFRAN    20 tablet    Take 1 tablet (8 mg) by mouth every 8 hours as needed for nausea    Nausea       SPRINTEC 28 0.25-35 MG-MCG per tablet   Generic drug:  norgestimate-ethinyl estradiol     84 tablet    TAKE 1 TABLET BY MOUTH ONCE DAILY.    General counseling for prescription of oral contraceptives       sucralfate 1 GM/10ML suspension    CARAFATE    1200 mL    Take 10 mLs (1 g) by mouth 4 times daily    Bile reflux gastritis, Nausea       triamcinolone 0.1 % cream    KENALOG    15 g    Apply sparingly to oral ulcers three times daily for 14 days as needed.    Behcet's syndrome (H)       * Notice:  This list has 6 medication(s) that are the same as other medications prescribed for you. Read the directions carefully, and ask your doctor or other care provider to review them with you.

## 2018-04-05 NOTE — PROGRESS NOTES
SLEEP MEDICINE CONSULTATION  Sleep Psychology    Name: Samara Oropeza MRN# [id number]   Age: 22 year old YOB: 1995     Date of Consultation: Apr 5, 2018  Consultation is requested by: Cata Hurst NP  [facility] [address] [place] [address]  Primary care provider: oSnja Abreu    Reason for Sleep Consultation     Samara Oropeza is a 22 year old female seen today for a behavioral sleep medicine consultation because of insomnia, reported gasping at night, nightmares and sleep paralysis.      Assessment and Plan     Sleep Diagnoses/Recommendations:    1.  Insomnia unspecified    Insomnia appears multifactor associated with inadequate sleep hygiene, restless legs, suspected sleep apnea and other sleep behavior as outlined below.  Patient was advised to keep a consistent sleep window between 11 PM-7 AM, avoid naps as able and to avoid driving if drowsy.  She would be seen in our behavioral sleep medicine clinic after completion of an in lab sleep study and  consultation with my sleep medicine colleague, Dr. Dashawn Colunga.  The degree of daytime sleepiness is not typical of psychophysiologic insomnia.  It may be representative of an intrinsic sleep disorder or a combination of inadequate sleep hygiene, insufficient sleep and contribution from mental health conditions including depression, generalized anxiety disorder and PTSD.    Patient was advised to continue following with her psychiatrist and consider concomitant psychotherapy for treatment of depression, anxiety and PTSD.    1. Excessive daytime sleepiness  Excessive daytime sleepiness may be multi factored.  Because of maternal and paternal family history of sleep apnea and report of choking/gasping for air at night and nonrestorative sleep there is suspicion for possible sleep apnea.  We will order an in lab sleep study to evaluate for possible sleep apnea.  Review of sleep diaries however also suggested  insufficient sleep may be a contributor.  Lastly, with patient reporting frequent sleep paralysis frequent inadvertent napping, sometime mid-conversation, a hypersomnia workup is recommended including 2 weeks of actigraphy  prior to sleep study, toxicology screen, and thong MARIE if no clinically significant sleep apnea present and patient attains at least 6 hours of sleep during polysomnography.  Patient will follow up with my colleague Dr. Dashawn Colunga following sleep study for medical consultation,, review of sleep study and initiation of any treatment if indicated. Orders pended to sleep medicine for review and authorization.  2. Restless legs syndrome (RLS)  Patient is reporting significant restless leg symptoms that affect both sleep onset and sleep maintenance.  A ferritin level was obtained on 1-18 2018 and was low (4).  Follow-up with sleep medicine to consider treatment alternatives.  Patient indicates that she cannot tolerate oral administration of iron.  -  3. Suspected sleep apnea  Strong family history of sleep apnea,  both parents, excessive daytime sleepiness, gasping/choking reported.  Completion of in lab sleep study as outlined above.  -  4. Sleep paralysis  Completion of in lab sleep study including hypersomnia workup as outlined above.    5. Nightmare disorder  Nightmares appear persistent, thematic-typically death of dog, parent or self and likely related to posttraumatic stress disorder.  Patient indicates she did not tolerate prazosin and is currently taking cyproheptadine 4 mg at bedtime.  She is reporting a 25-50% reduction in nightmare frequency and intensity with medication.  Consider adding imagery rehearsal therapy as an adjuvant treatment.    Orders   -- SLEEP EVALUATION & MANAGEMENT REFERRAL - ADULT -[facility] [place]  [phone] (Age 15 and up) (Schedule with Dashawn Colunga MD); Future  - Comprehensive Sleep Study; Future  - Urine Drugs of Abuse Screen Panel  13        See patient instructions for initial treatment recommendations and behavioral sleep plan.    Summary Counseling:      Samara was provided information about the pathophysiology of insomnia and psychophysiological factors contributing to the onset and maintenance of insomnia associated with PTSD, depression, anxiety.  Discussed sleep disorders and other factors as possible contributors to excessive daytime sleepiness.  Treatment option were discussed including component of cognitive-behavioral therapy for insomnia.  Discussed the pathophysiology of sleep apnea, components of polysomnography and multiple sleep latency test.  The benefits and potential early side effects of treatment including increased daytime sleepiness were discussed. She was advised to seek medical advise and consultation around use of or changes to prescription sleep medication, Patient was counseled on the importance of avoiding driving if drowsy.        Follow-up: 8 weeks following completion of in lab sleep study,actigraphy and MSLT if indicated based on polysomnography.  Sleep Medicine consultation following sleep study for review of sleep study and treatment for intrinsic sleep disorders if indicated.     History of Present Sleep Complaint     Samara Oropeza is a 22 year old year old female who reports the onset of insomnia at the age of approximately 11-12 years old following what she describes as to traumatic incidences involving the death of grandmother and witnessing the intentional killing of her dog.  She reports that she gradually became more depressed and anxious.  She is diagnosed with major depressive disorder, generalized anxiety disorder and posttraumatic stress disorder.  She is seen by Cata Pachecouh of psychiatry but indicates she has elected not to take antidepressant or other pharmacologic treatment for mental health conditions.  She does report recurrent nightmares that are  somewhat stereotyped involving the  death of her dog, family members or self.  She was initially prescribed prazosin but found that she could not tolerate this medication.  More recently she was prescribed cyproheptadine and  Has found this to be somewhat helpful.  She estimates that the intensity and frequency of her nightmares have been reduced between 25-50%.    Patient reports at times she will move, kick or yell during her nightmares. She does not report sleepwalking, sleep-related injuries or broader dream enactment.    Patient currently takes amitriptyline at bedtime for pain and sleep.  She reports a diagnosis of fibromyalgia.    Recently patient has experienced seizure-like episodes and underwent a 5 day video EEG monitoring.  Typical episodes were not thought to be seizures.  She was seen by cardiology due to an abnormal tilt table test and possible arrhythmia.    Samara reports that considerable daytime sleepiness.  She reports choking/gasping at night.  Dawson has noticed gasping but otherwise no clear witnessed apnea.  She does at times wake up feeling quite anxious is unclear as to whether she feels anxious because of the gasping/choking or whether the choking sensation relates to feelings of anxiety  triggered by nightmares.  Patient does report intermittent snoring.    Patient reports restless and uncomfortable  Leg semsatopms  that emerge in the evening and can affect her ability to fall asleep and may be experienced in the middle of the night as well.  Symptoms are not present during the morning or afternoon.  A recent ferritin level was obtained and was 4.  She indicates that she cannot tolerate oral iron supplements.    Patient reports that she  experiences a surreal sense of falling at sleep onset followed by episodes of sleep paralysis for periods up to 5 minutes.  She says this occurs 5-7 nights per week she finds the experience unsettling and anxiety provoking.  Her fiancé, present during the interview,  has witnessed these  "on numerous occasions.  Patient also reports frequent dozing and  inadvertent napping during the afternoon and evenings most days of the week p[er her sleep diaries.  Naps may last between 15-60 minutes.  She also indicates that she can suddenly fall asleep mid conversation.    With respect to sleep schedule, patient indicates she usually goes to bed at about 10:30 PM with her fiancé.  It typically takes her about 60 minutes to fall asleep.  She usually gets out of bed at about 7 AM.  Her sleep diaries however indicate that she tends to doze frequently throughout the day.  Sleep offset varies between 530-7 AM.  There is pronounced middle insomnia.  She estimates her total sleep time is between 4-6 hours.  She typically has at least one caffeinated beverage per day.  Caffeine use varies between morning through early evening.  She denies use of illicit or street drugs.  She only occasionally uses alcohol.    Denies childhood history of sleep walking, talking.    Previous Sleep Studies:    No previous sleep studies      Sleep Environment:    Samara reports her bedroom is quiet, comfortable and dark. The patient does have a regular bed partner and she  does not have pets in the bedroom.     Sleep Behavior    Samara does report pain or discomfort at night. There is awakening due to heart pounding or racing.         Screening      STOP Ban-3    [place] Sleepiness Scale  Total score - [place]: 18 .    MICHAEL Total Score: 23       PHQ-9 SCORE 2018   Total Score -   Total Score MyChart -   Total Score 14       TAHIR-7 SCORE 2017   Total Score - - -   Total Score 10 (moderate anxiety) 18 (severe anxiety) -   Total Score 10 18 12       Patient Activation Score   No flowsheet data found.      Vitals     /80  Pulse 83  Ht 1.575 m (5' 2\")  Wt 67.6 kg (149 lb)  SpO2 99%  BMI 27.25 kg/m2     Medical History     Past Medical History:   Diagnosis Date     Anxiety      Arthritis      " Behcet's disease (H)      Cervical adenitis May 2010     Chronic abdominal pain      Constipation, chronic 1994     Gastro-oesophageal reflux disease      Gastroparesis      Migraines      Neuromuscular disorder (H)     fibramyalgia     Palpitations      Seizure (H)      Seizures (H)     unknown etiology     Syncope      Tourette's        Current Outpatient Prescriptions   Medication Sig Dispense Refill     ondansetron (ZOFRAN) 8 MG tablet Take 1 tablet (8 mg) by mouth every 8 hours as needed for nausea 20 tablet 1     diclofenac (VOLTAREN) 1 % GEL topical gel Apply 2-4 grams to affected area(s) up to 4 times per day as needed. This is an anti-inflammatory medication. 100 g 4     hyoscyamine (ANASPAZ/LEVSIN) 0.125 MG tablet Take 1-2 tablets (125-250 mcg) by mouth every 4 hours as needed for cramping 40 tablet 1     guanFACINE (TENEX) 1 MG tablet Take 3 tablets (3 mg) by mouth twice daily morning and evening. 180 tablet 0     OnabotulinumtoxinA (BOTOX IJ) Inject 165 Units as directed once Lot#: /C3  Exp: 10/2020       OnabotulinumtoxinA (BOTOX IJ) Inject 165 Units as directed once Lot # /C3   Exp:  10/2020       hydrOXYzine (ATARAX) 25 MG tablet Take 1-2 tablets (25-50 mg) by mouth every 6 hours as needed for anxiety 90 tablet 2     cyproheptadine (PERIACTIN) 4 MG tablet Take 1 tablet (4 mg) by mouth At Bedtime For nightmares 30 tablet 2     lubiprostone (AMITIZA) 24 MCG capsule Take 1 capsule (24 mcg) by mouth 2 times daily (with meals) 30 capsule 1     benzocaine (TOPICALE XTRA) 20 % GEL Apply as needed locally to mouth or nasal ulcers for pain; 4 times daily as needed 30 g 1     colchicine 0.6 MG tablet TAKE 2 TABLETS BY MOUTH EVERY MORNING AND 1 TABLET EVERY EVENING. 270 tablet 0     apremilast (OTEZLA) 30 MG tablet Take 1 tablet (30 mg) by mouth 2 times daily       linaclotide (LINZESS) 145 MCG capsule Take 1 capsule (145 mcg) by mouth every morning (before breakfast) 90 capsule 1     dicyclomine  (BENTYL) 20 MG tablet Take 1 tablet (20 mg) by mouth 4 times daily as needed 120 tablet 3     aspirin-acetaminophen-caffeine (EXCEDRIN MIGRAINE) 250-250-65 MG per tablet Take 1 tablet by mouth every 6 hours as needed for headaches       SPRINTEC 28 0.25-35 MG-MCG per tablet TAKE 1 TABLET BY MOUTH ONCE DAILY. 84 tablet 2     OnabotulinumtoxinA (BOTOX IJ) Inject 165 Units into the muscle once Lot /C3  Exp 06/2020       guanFACINE (TENEX) 1 MG tablet Take 3 tablets (3 mg) by mouth 2 times daily 270 tablet 3     diltiazem 2% in PLO cream, [facility] COMPOUNDED, 2% GEL Apply small pea size amount three times daily to anus until pain is gone. 30 g 1     COMPOUNDED NON-CONTROLLED SUBSTANCE (CMPD RX) - PHARMACY TO MIX COMPOUNDED MEDICATION Take one capsule in the morning 30 capsule 0     amitriptyline (ELAVIL) 50 MG tablet Take 1 tablet (50 mg) by mouth At Bedtime 30 tablet 11     lactulose 20 GM/30ML SOLN Take 30 mLs by mouth 3 times daily as needed 300 mL 3     LORazepam (ATIVAN) 1 MG tablet Take 0.5 tablets (0.5 mg) by mouth 2 times daily as needed for other (atypical chest pain) Do not operate a vehicle after taking this medication 60 tablet 0     ondansetron (ZOFRAN-ODT) 8 MG ODT tab Take 1 tablet (8 mg) by mouth every 8 hours as needed for nausea 60 tablet 6     diphenhydrAMINE (BENADRYL) 25 MG tablet Take 1 tablet (25 mg) by mouth every 8 hours as needed for allergies 30 tablet 0     sucralfate (CARAFATE) 1 GM/10ML suspension Take 10 mLs (1 g) by mouth 4 times daily 1200 mL 2     aspirin (ASPIRIN CHILDRENS) 81 MG chewable tablet Take 81 mg by mouth as needed        omeprazole (PRILOSEC) 40 MG capsule Take 1 capsule (40 mg) by mouth daily 90 capsule 3     EPINEPHrine (EPIPEN 2-EAMON) 0.3 MG/0.3ML injection Inject 0.3 mLs (0.3 mg) into the muscle as needed for anaphylaxis 0.6 mL 3     albuterol (PROAIR HFA/PROVENTIL HFA/VENTOLIN HFA) 108 (90 BASE) MCG/ACT Inhaler Inhale 2 puffs into the lungs every 6 hours as needed for  shortness of breath / dyspnea or wheezing 1 Inhaler 1     Magic Mouthwash ([facility] std formula) lidocaine visc 2% 2.5mL/5mL & maalox/mylanta w/ simeth 2.5mL/5mL & diphenhydrAMINE 5mg/5mL Swish and swallow 10 mLs in mouth every 6 hours as needed for mouth sores 1 Bottle 1     clobetasol (TEMOVATE) 0.05 % cream Apply topically 2 times daily 60 g 0     botulinum toxin type A (BOTOX) 100 UNITS injection Inject 200 Units into the muscle every 3 months 200 Units 3     hypromellose (GENTEAL) 0.3 % SOLN 1 drop every hour as needed for dry eyes        betamethasone valerate (VALISONE) 0.1 % cream Apply topically 2 times daily       carbamide peroxide (DEBROX) 6.5 % otic solution 5 drops 2 times daily       Lactase (LACTAID PO) Take by mouth daily       lidocaine (XYLOCAINE) 2 % jelly Apply 1 Tube topically daily Reported on 4/21/2017       triamcinolone (KENALOG) 0.1 % cream Apply sparingly to oral ulcers three times daily for 14 days as needed. 15 g 1       Past Surgical History:   Procedure Laterality Date     ARTHROSCOPY KNEE WITH PATELLAR REALIGNMENT  7/25/2013    Procedure: ARTHROSCOPY KNEE WITH PATELLAR REALIGNMENT;  Left Knee Arthroscopy, Medial Patellofemoral Ligament Reconstruction with Allograft  ;  Surgeon: Jennifer Acevedo MD;  [facility]: [facility]     COLONOSCOPY  2015     DENTAL SURGERY  1996    Teeth removal     ENDOSCOPY UPPER, COLONOSCOPY, COMBINED  2005     HC ESOPH/GAS REFLUX TEST W NASAL IMPED >1 HR N/A 2/15/2017    Procedure: ESOPHAGEAL IMPEDENCE FUNCTION TEST WITH 24 HOUR PH GREATER THAN 1 HOUR;  Surgeon: Timothy Matta MD;  Location:  GI          Allergies   Allergen Reactions     Amoxil [Penicillins] Rash     Dad unsure of reaction.     Bee Venom Anaphylaxis     Contrast Dye Rash     Contrast Media Ready-Box [facility] [place], 04/09/2014.; Contrast Media Ready-Box [facility] [place], 04/09/2014.  NOTE: this is a contrast media oral with iodine. Premedicate with methylpred standard for IV contrast, request barium contrast for  oral contrast.     Kiwi Swelling     Orange Fruit [Citrus] Anaphylaxis     Pineapple Anaphylaxis, Difficulty breathing and Rash     Reglan [Metoclopramide] Other (See Comments)     IV dose only, in ER, rapid heart rate.     Ace Inhibitors      Difficulty in breathing and GI upset     Amitiza [Lubiprostone] Nausea and Vomiting     Amoxicillin-Pot Clavulanate      Latex      Midazolam Unknown     Other reaction(s): Unknown  parent states that when pt takes this medication, she wakes up being very violent .  parent states that when pt takes this medication, she wakes up being very violent .     No Clinical Screening - See Comments      Bleech/ chest tightness, itchy throat, swollen tongue, hives     Tomatoes [Tomato]      Versed      Coming out of pelvic exam at age of 5, was kicking and screaming when coming out of the versed.     Adhesive Tape Rash     Azithromycin Hives and Rash     Cephalexin Itching and Rash     Itchy mouth  Itchy mouth     Keflex [Cephalexin-Fd&C Yellow #6] Hives         Psychiatric History     Prior Psychiatric Diagnoses: yes, major depressive disorder, generalized anxiety disorder, posttraumatic stress disorder   Psychiatric Hospitalizations: none, does see psychiatristCata   Use of Psychotropics Yes nortriptyline,      Chemical Use     Prior Chemical Dependency Treatment: none         Family History     Family History   Problem Relation Age of Onset     Depression Mother      Neurologic Disorder Mother      Migraines, take imitrex injection.  Also in maternal grandmother.       Alcohol/Drug Father      Hypertension Father      Depression Father      Cardiovascular Maternal Grandmother      Depression Maternal Grandmother      Hypertension Maternal Grandmother      Alzheimer Disease Maternal Grandmother      Cardiovascular Maternal Grandfather      Hypertension Maternal Grandfather      Depression Maternal Grandfather      Alcohol/Drug Maternal Grandfather      Cardiovascular Paternal  "Grandmother      Hypertension Paternal Grandmother      Cardiovascular Paternal Grandfather      Hypertension Paternal Grandfather      Glaucoma No family hx of      Macular Degeneration No family hx of        Sleep Disorders: Mother and father with sleep apnea    Social History     Social History     Social History     Marital status: Single     Spouse name: N/A     Number of children: N/A     Years of education: N/A     Social History Main Topics     Smoking status: Never Smoker     Smokeless tobacco: Never Used     Alcohol use 0.6 oz/week     1 Standard drinks or equivalent per week      Comment: rarely     Drug use: No     Sexual activity: Yes     Partners: Male     Birth control/ protection: Pill     Other Topics Concern     Not on file     Social History Narrative    Boyfriend of 5 years, living with \"in laws\" Oct 2017 and looking forward to their own apartment.        Patient is engaged,  currently lives with boyfriend, not currently working.  Previously worked as a PCA     Mental Status Examination     Samara presented as appropriately dressed and groomed and was oriented X3 with speech language intact.  The patient was cooperative throughout the evaluation with no signs of hallucinations, delusions, loosening of associations or other thought disturbance.  Mood was somewhat anxious.  Affect was congruent with mood. Insight and judgement we intact.  Memory was intact for recent and remote elements.  There was no report of suicidal ideation, intention or plan. Attention and concentration were within normal.      Time Spent: 55 minutes    Copy:   Sonja Abreu NP  [facility] [address] [place] [address]    Kimo Gaston PsyD, PAULETTE, CBSM  Certified, Behavioral Sleep she Medicine  [facility] -  [place] and [place]                           "

## 2018-04-05 NOTE — NURSING NOTE
"Chief Complaint   Patient presents with     Sleep Problem     cosnult-Im having sleep paralysis weekly, bad dreams and nightmaresat least 5 of the 7 days a week, wake up from feeling like I can't breathe, hard to call asleep and to stay alseep.        Initial /80  Pulse 83  Ht 1.575 m (5' 2\")  Wt 67.6 kg (149 lb)  SpO2 99%  BMI 27.25 kg/m2 Estimated body mass index is 27.25 kg/(m^2) as calculated from the following:    Height as of this encounter: 1.575 m (5' 2\").    Weight as of this encounter: 67.6 kg (149 lb).  Medication Reconciliation: complete    "

## 2018-04-06 ASSESSMENT — ENCOUNTER SYMPTOMS
TASTE DISTURBANCE: 1
NUMBNESS: 1
JOINT SWELLING: 1
BRUISES/BLEEDS EASILY: 1
EYE REDNESS: 1
BOWEL INCONTINENCE: 0
SPUTUM PRODUCTION: 0
DISTURBANCES IN COORDINATION: 1
DYSPNEA ON EXERTION: 0
HEARTBURN: 1
POLYPHAGIA: 0
JAUNDICE: 0
CONSTIPATION: 1
WEAKNESS: 1
SYNCOPE: 1
DOUBLE VISION: 1
DIARRHEA: 1
INCREASED ENERGY: 1
HALLUCINATIONS: 0
BLOATING: 1
DEPRESSION: 0
NECK PAIN: 1
EYE PAIN: 1
LEG PAIN: 1
NECK MASS: 0
ALTERED TEMPERATURE REGULATION: 1
SNORES LOUDLY: 0
LOSS OF CONSCIOUSNESS: 1
TREMORS: 1
PARALYSIS: 1
FATIGUE: 1
SKIN CHANGES: 0
RECTAL PAIN: 0
COUGH DISTURBING SLEEP: 0
SINUS CONGESTION: 1
POLYDIPSIA: 1
ARTHRALGIAS: 1
HEADACHES: 1
PANIC: 1
SEIZURES: 0
MYALGIAS: 1
COUGH: 0
ABDOMINAL PAIN: 1
WHEEZING: 0
NAUSEA: 1
TROUBLE SWALLOWING: 1
MEMORY LOSS: 1
SWOLLEN GLANDS: 0
BACK PAIN: 1
DIZZINESS: 1
NAIL CHANGES: 1
SINUS PAIN: 1
FEVER: 1
EYE WATERING: 1
MUSCLE WEAKNESS: 1
HYPOTENSION: 1
HYPERTENSION: 1
EYE IRRITATION: 1
EXERCISE INTOLERANCE: 1
SLEEP DISTURBANCES DUE TO BREATHING: 1
SMELL DISTURBANCE: 1
PALPITATIONS: 1
WEIGHT GAIN: 1
HEMOPTYSIS: 0
DECREASED CONCENTRATION: 1
TINGLING: 1
VOMITING: 1
DECREASED APPETITE: 1
ORTHOPNEA: 1
STIFFNESS: 1
POSTURAL DYSPNEA: 1
SPEECH CHANGE: 1
NERVOUS/ANXIOUS: 1
CHILLS: 1
WEIGHT LOSS: 1
MUSCLE CRAMPS: 1
SHORTNESS OF BREATH: 1
HOARSE VOICE: 1
LIGHT-HEADEDNESS: 1
BLOOD IN STOOL: 0
NIGHT SWEATS: 1
INSOMNIA: 1
POOR WOUND HEALING: 1
SORE THROAT: 1

## 2018-04-06 ASSESSMENT — ANXIETY QUESTIONNAIRES: GAD7 TOTAL SCORE: 12

## 2018-04-06 ASSESSMENT — PATIENT HEALTH QUESTIONNAIRE - PHQ9: SUM OF ALL RESPONSES TO PHQ QUESTIONS 1-9: 14

## 2018-04-10 DIAGNOSIS — F95.9 TIC: ICD-10-CM

## 2018-04-10 NOTE — TELEPHONE ENCOUNTER
Sonja,     Please review/sign or advise. Medication is not listed on refill protocol.     MARIAN McintoshN RN  [facility]

## 2018-04-11 RX ORDER — GUANFACINE 1 MG/1
TABLET ORAL
Qty: 180 TABLET | Refills: 1 | Status: SHIPPED | OUTPATIENT
Start: 2018-04-11 | End: 2018-06-01

## 2018-04-12 ENCOUNTER — PRE VISIT (OUTPATIENT)
Dept: CARDIOLOGY | Facility: CLINIC | Age: 23
End: 2018-04-12

## 2018-04-12 ENCOUNTER — OFFICE VISIT (OUTPATIENT)
Dept: CARDIOLOGY | Facility: CLINIC | Age: 23
End: 2018-04-12
Attending: INTERNAL MEDICINE
Payer: COMMERCIAL

## 2018-04-12 VITALS
BODY MASS INDEX: 26.12 KG/M2 | WEIGHT: 147.4 LBS | HEART RATE: 122 BPM | SYSTOLIC BLOOD PRESSURE: 124 MMHG | OXYGEN SATURATION: 96 % | HEIGHT: 63 IN | DIASTOLIC BLOOD PRESSURE: 81 MMHG

## 2018-04-12 DIAGNOSIS — I95.1 ORTHOSTATIC HYPOTENSION: Primary | ICD-10-CM

## 2018-04-12 DIAGNOSIS — R55 SYNCOPE, UNSPECIFIED SYNCOPE TYPE: ICD-10-CM

## 2018-04-12 PROCEDURE — 99214 OFFICE O/P EST MOD 30 MIN: CPT | Mod: GC | Performed by: INTERNAL MEDICINE

## 2018-04-12 PROCEDURE — G0463 HOSPITAL OUTPT CLINIC VISIT: HCPCS | Mod: ZF

## 2018-04-12 ASSESSMENT — PAIN SCALES - GENERAL: PAINLEVEL: NO PAIN (0)

## 2018-04-12 NOTE — PATIENT INSTRUCTIONS
We encourage you to use My Chart as your primary form of communication if possible. If you need assistance in setting this up, please contact our office or ask at your follow up visit.     If you need a medication refill please contact your pharmacy. Please allow at least 3 business days for your refill to be completed.       Cardiology  Telephone Number    Cornelia Baugh [phone]   Or send a message to your provider via my chart.   For scheduling procedures:    [facility]    Clinic appointments       [phone]   For the Device Clinic (Pacemakers and ICD's)   RN's :   Ashtyn Parham  During business hours: [phone]    After business hours:   [phone]- select option 4 and ask for job code [phone].          As always, Thank you for trusting us with your health care needs!

## 2018-04-12 NOTE — TELEPHONE ENCOUNTER
HPI:  Samara Oropeza is a 22 year old female who presents for evaluation of syncopal episodes.  Dec 2017 was admitted to neurology. Had Video/EEG monitoring for 3 days. This tried to identify 4 types of spells she complained of.  Spell type 1 are convulsive spells.  Spell type 2 are syncopal spells where she falls and loses awareness.  In the past, she has hurt her ankle with these specific spells.  Spell type 3 are staring spells where she is unresponsive.  Spell 4 are nightmares.  Video EEG recording during these 3 days, we recorded 1 spell on day 2 in which the patient felt tingling sensation in her toes and feet and lightheaded     In the past had ankle fracture and also 2 concussions as per patient. States she live on 3rd floor and by the time she reach 2nd floor,gets blurred vision.  No palpitation.   or cardiac symptoms. She C/O chest pain on and off and had a normal stress MRI in past.    She saw EP in past and was very unhappy with advise of increased salt and water intake.Had a tilt table test last December and results unknown.  Her diagnosis include Behcet's disease and RA .  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      PAST MEDICAL HISTORY:  Past Medical History:   Diagnosis Date     Anxiety      Arthritis      Behcet's disease (H)      Cervical adenitis May 2010     Chronic abdominal pain      Constipation, chronic 1994     Gastro-oesophageal reflux disease      Gastroparesis      Migraines      Neuromuscular disorder (H)     fibramyalgia     Palpitations      Seizure (H)      Seizures (H)     unknown etiology     Syncope      Tourette's        CURRENT MEDICATIONS:  Current Outpatient Prescriptions   Medication Sig Dispense Refill     guanFACINE (TENEX) 1 MG tablet Take 3 tablets (3 mg) by mouth twice daily morning and evening. 180 tablet 1     ondansetron (ZOFRAN) 8 MG tablet Take 1 tablet (8 mg) by mouth every 8 hours as needed for nausea 20 tablet 1     diclofenac  (VOLTAREN) 1 % GEL topical gel Apply 2-4 grams to affected area(s) up to 4 times per day as needed. This is an anti-inflammatory medication. 100 g 4     hyoscyamine (ANASPAZ/LEVSIN) 0.125 MG tablet Take 1-2 tablets (125-250 mcg) by mouth every 4 hours as needed for cramping 40 tablet 1     OnabotulinumtoxinA (BOTOX IJ) Inject 165 Units as directed once Lot#: /C3  Exp: 10/2020       OnabotulinumtoxinA (BOTOX IJ) Inject 165 Units as directed once Lot # /C3   Exp:  10/2020       hydrOXYzine (ATARAX) 25 MG tablet Take 1-2 tablets (25-50 mg) by mouth every 6 hours as needed for anxiety 90 tablet 2     cyproheptadine (PERIACTIN) 4 MG tablet Take 1 tablet (4 mg) by mouth At Bedtime For nightmares 30 tablet 2     lubiprostone (AMITIZA) 24 MCG capsule Take 1 capsule (24 mcg) by mouth 2 times daily (with meals) 30 capsule 1     benzocaine (TOPICALE XTRA) 20 % GEL Apply as needed locally to mouth or nasal ulcers for pain; 4 times daily as needed 30 g 1     colchicine 0.6 MG tablet TAKE 2 TABLETS BY MOUTH EVERY MORNING AND 1 TABLET EVERY EVENING. 270 tablet 0     apremilast (OTEZLA) 30 MG tablet Take 1 tablet (30 mg) by mouth 2 times daily       linaclotide (LINZESS) 145 MCG capsule Take 1 capsule (145 mcg) by mouth every morning (before breakfast) 90 capsule 1     dicyclomine (BENTYL) 20 MG tablet Take 1 tablet (20 mg) by mouth 4 times daily as needed 120 tablet 3     aspirin-acetaminophen-caffeine (EXCEDRIN MIGRAINE) 250-250-65 MG per tablet Take 1 tablet by mouth every 6 hours as needed for headaches       SPRINTEC 28 0.25-35 MG-MCG per tablet TAKE 1 TABLET BY MOUTH ONCE DAILY. 84 tablet 2     OnabotulinumtoxinA (BOTOX IJ) Inject 165 Units into the muscle once Lot /C3  Exp 06/2020       guanFACINE (TENEX) 1 MG tablet Take 3 tablets (3 mg) by mouth 2 times daily 270 tablet 3     diltiazem 2% in PLO cream, FV COMPOUNDED, 2% GEL Apply small pea size amount three times daily to anus until pain is gone. 30 g 1      COMPOUNDED NON-CONTROLLED SUBSTANCE (CMPD RX) - PHARMACY TO MIX COMPOUNDED MEDICATION Take one capsule in the morning 30 capsule 0     amitriptyline (ELAVIL) 50 MG tablet Take 1 tablet (50 mg) by mouth At Bedtime 30 tablet 11     lactulose 20 GM/30ML SOLN Take 30 mLs by mouth 3 times daily as needed 300 mL 3     LORazepam (ATIVAN) 1 MG tablet Take 0.5 tablets (0.5 mg) by mouth 2 times daily as needed for other (atypical chest pain) Do not operate a vehicle after taking this medication 60 tablet 0     ondansetron (ZOFRAN-ODT) 8 MG ODT tab Take 1 tablet (8 mg) by mouth every 8 hours as needed for nausea 60 tablet 6     diphenhydrAMINE (BENADRYL) 25 MG tablet Take 1 tablet (25 mg) by mouth every 8 hours as needed for allergies 30 tablet 0     sucralfate (CARAFATE) 1 GM/10ML suspension Take 10 mLs (1 g) by mouth 4 times daily 1200 mL 2     aspirin (ASPIRIN CHILDRENS) 81 MG chewable tablet Take 81 mg by mouth as needed        omeprazole (PRILOSEC) 40 MG capsule Take 1 capsule (40 mg) by mouth daily 90 capsule 3     EPINEPHrine (EPIPEN 2-EAMON) 0.3 MG/0.3ML injection Inject 0.3 mLs (0.3 mg) into the muscle as needed for anaphylaxis 0.6 mL 3     albuterol (PROAIR HFA/PROVENTIL HFA/VENTOLIN HFA) 108 (90 BASE) MCG/ACT Inhaler Inhale 2 puffs into the lungs every 6 hours as needed for shortness of breath / dyspnea or wheezing 1 Inhaler 1     Magic Mouthwash (FV std formula) lidocaine visc 2% 2.5mL/5mL & maalox/mylanta w/ simeth 2.5mL/5mL & diphenhydrAMINE 5mg/5mL Swish and swallow 10 mLs in mouth every 6 hours as needed for mouth sores 1 Bottle 1     clobetasol (TEMOVATE) 0.05 % cream Apply topically 2 times daily 60 g 0     botulinum toxin type A (BOTOX) 100 UNITS injection Inject 200 Units into the muscle every 3 months 200 Units 3     hypromellose (GENTEAL) 0.3 % SOLN 1 drop every hour as needed for dry eyes        betamethasone valerate (VALISONE) 0.1 % cream Apply topically 2 times daily       carbamide peroxide (DEBROX)  6.5 % otic solution 5 drops 2 times daily       Lactase (LACTAID PO) Take by mouth daily       lidocaine (XYLOCAINE) 2 % jelly Apply 1 Tube topically daily Reported on 4/21/2017       triamcinolone (KENALOG) 0.1 % cream Apply sparingly to oral ulcers three times daily for 14 days as needed. 15 g 1       PAST SURGICAL HISTORY:  Past Surgical History:   Procedure Laterality Date     ARTHROSCOPY KNEE WITH PATELLAR REALIGNMENT  7/25/2013    Procedure: ARTHROSCOPY KNEE WITH PATELLAR REALIGNMENT;  Left Knee Arthroscopy, Medial Patellofemoral Ligament Reconstruction with Allograft  ;  Surgeon: Jennifer Acevedo MD;  Location: [facility]     COLONOSCOPY  2015     DENTAL SURGERY  1996    Teeth removal     ENDOSCOPY UPPER, COLONOSCOPY, COMBINED  2005     HC ESOPH/GAS REFLUX TEST W NASAL IMPED >1 HR N/A 2/15/2017    Procedure: ESOPHAGEAL IMPEDENCE FUNCTION TEST WITH 24 HOUR PH GREATER THAN 1 HOUR;  Surgeon: Timothy Matta MD;  Location: [facility]       ALLERGIES:     Allergies   Allergen Reactions     Amoxil [Penicillins] Rash     Dad unsure of reaction.     Bee Venom Anaphylaxis     Contrast Dye Rash     Contrast Media Ready-Box [facility] [place], 04/09/2014.; [facility].  NOTE: this is a contrast media oral with iodine. Premedicate with methylpred standard for IV contrast, request barium contrast for oral contrast.     Kiwi Swelling     Orange Fruit [Citrus] Anaphylaxis     Pineapple Anaphylaxis, Difficulty breathing and Rash     Reglan [Metoclopramide] Other (See Comments)     IV dose only, in ER, rapid heart rate.     Ace Inhibitors      Difficulty in breathing and GI upset     Amitiza [Lubiprostone] Nausea and Vomiting     Amoxicillin-Pot Clavulanate      Latex      Midazolam Unknown     Other reaction(s): Unknown  parent states that when pt takes this medication, she wakes up being very violent .  parent states that when pt takes this medication, she wakes up being very violent .     No Clinical  Screening - See Comments      Bleech/ chest tightness, itchy throat, swollen tongue, hives     Tomatoes [Tomato]      Versed      Coming out of pelvic exam at age of 5, was kicking and screaming when coming out of the versed.     Adhesive Tape Rash     Azithromycin Hives and Rash     Cephalexin Itching and Rash     Itchy mouth  Itchy mouth     Keflex [Cephalexin-Fd&C Yellow #6] Hives       FAMILY HISTORY:  Family History   Problem Relation Age of Onset     Depression Mother      Neurologic Disorder Mother      Migraines, take imitrex injection.  Also in maternal grandmother.       Alcohol/Drug Father      Hypertension Father      Depression Father      Cardiovascular Maternal Grandmother      Depression Maternal Grandmother      Hypertension Maternal Grandmother      Alzheimer Disease Maternal Grandmother      Cardiovascular Maternal Grandfather      Hypertension Maternal Grandfather      Depression Maternal Grandfather      Alcohol/Drug Maternal Grandfather      Cardiovascular Paternal Grandmother      Hypertension Paternal Grandmother      Cardiovascular Paternal Grandfather      Hypertension Paternal Grandfather      Glaucoma No family hx of      Macular Degeneration No family hx of          SOCIAL HISTORY:  Social History   Substance Use Topics     Smoking status: Never Smoker     Smokeless tobacco: Never Used     Alcohol use 0.6 oz/week     1 Standard drinks or equivalent per week      Comment: rarely       ROS:   Constitutional: No fever, chills, or sweats. Weight stable.   ENT: No visual disturbance, ear ache, epistaxis, sore throat.   Cardiovascular: As per HPI.   Respiratory: No cough, hemoptysis.    GI: No nausea, vomiting, hematemesis, melena, or hematochezia.   : No hematuria.   Integument: Negative.   Psychiatric: Negative.   Hematologic:  Easy bruising, no easy bleeding.  Neuro: Negative.   Endocrinology: No significant heat or cold intolerance   Musculoskeletal: No myalgia.    Exam:  There were no  vitals taken for this visit.  GENERAL APPEARANCE: healthy, alert and no distress  HEENT: no icterus, no xanthelasmas, normal pupil size and reaction, normal palate, mucosa moist, no central cyanosis  NECK: no adenopathy, no asymmetry, masses, or scars, thyroid normal to palpation and no bruits, JVP not elevated  RESPIRATORY: lungs clear to auscultation - no rales, rhonchi or wheezes, no use of accessory muscles, no retractions, respirations are unlabored, normal respiratory rate  CARDIOVASCULAR: regular rhythm, normal S1 with physiologic split S2, no S3 or S4 and no murmur, click or rub, precordium quiet with normal PMI.  ABDOMEN: soft, non tender, without hepatosplenomegaly, no masses palpable, bowel sounds normal, aorta not enlarged by palpation, no abdominal bruits  EXTREMITIES: peripheral pulses normal, no edema, no bruits  NEURO: alert and oriented to person/place/time, normal speech, gait and affect  VASC: Radial, femoral, dorsalis pedis and posterior tibialis pulses are normal in volumes and symmetric bilaterally. No bruits are heard.  SKIN: no ecchymoses, no rashes    Labs:  CBC RESULTS:   Lab Results   Component Value Date    WBC 5.8 01/17/2018    RBC 4.29 01/17/2018    HGB 11.9 01/17/2018    HCT 37.7 01/17/2018    MCV 88 01/17/2018    MCH 27.7 01/17/2018    MCHC 31.6 01/17/2018    RDW 13.7 01/17/2018     01/17/2018       BMP RESULTS:  Lab Results   Component Value Date     01/08/2018    POTASSIUM 3.8 01/08/2018    CHLORIDE 106 01/08/2018    CO2 26 01/08/2018    ANIONGAP 8 01/08/2018    GLC 93 01/08/2018    BUN 8 01/08/2018    CR 0.72 01/08/2018    GFRESTIMATED >90 01/08/2018    GFRESTBLACK >90 01/08/2018    SHANKAR 9.0 01/08/2018        INR RESULTS:  Lab Results   Component Value Date    INR 0.99 11/06/2016    INR 1.03 05/06/2015       Procedures:  PULMONARY FUNCTION TESTS:   PFT Latest Ref Rng & Units 1/13/2017   FVC L 3.61   FEV1 L 3.02   FVC% % 106   FEV1% % 101     12/19/17: Tilt:   The  following maneuvers were done sequentially:  1- Supine for 50 minutes: /77 (cuff) 97/67 (Nexfin)  mmHg, HR 95 bpm. No significant change in hemodynamic profile. No symptoms.  2- Maneuvers in supine position:     A. Carotid sinus massage:         No change in HR or BP, no symptoms     B. Cough: Jem BP 97/55 mmHg,  bpm, Recovery approx 0 sec (no significant change), No symptoms.     C. Valsalva: Jem BP 85/63 mmHg, HR 76 bpm, Recovery approx about 60 sec, No symptoms.     This was consistent with physiologic response.  3- Supine rest for 5 minutes: No significant change in hemodynamic profile.  4- TILT at 70 degrees for 25 minutes:   Time                                    HR                 BP                                       Comments  pretilt                                   76                  84/62                                   Nexfin  0                                          98                  77/65                                   Table up (cuff 113/69)  1                                          95                  89/69               2                                          99                  76/63  3                                          103                83/70                                   Cuff 111/75  4                                          101                111/75  5                                          104                77/65  10                                        106                95/74                                   Cuff 113/74  16                                        115                94/71                                   Cuff 119/80  20                                        114                101/77                                 Cuff 111/82, NTG 0.4 mg CL  21                                        126                93/71                                   Cuff 111/81  22                                        139                99/78      "                              Cuff 112/77  24                                        178                91/73                                   Cuff 118/55  25                                        166                58/27                                   Cuff 115/63 (while table brought down by RN's discretion.)  Patient felt warm and had headache.     Comment:  Tilt test was negative but terminated prematurely by RN.  She maintained her BP with compensatory sinus tachycardia.  HR was starting to slow down.  This could have been a waning of NTG response (suggesting a negative test), but cannot rule out early indication of a vagal response.  Test would have normally been terminated at 30 minutes.  A practical suggestion would be to recommend liberalization of fluid and salt.  This would be typical first line therapy for vasovagal spells, particularly in a young patient with normal or low BP.  VIDEO EEG DATE:  12/21/2017         MEDICATIONS: No anticonvulsants at this time.        IMPRESSION OF VIDEO EEG DAY #3: This is a normal awake and sleep video electroencephalogram. No electrographic seizures or epileptiform discharges were recorded. During hyperventilation she felt funny, she had \"prickly sensation in the hands and feet, legs were numb, and lots of pressure in the head\". These was a target event. During this time there was no EEG seizure activity. This was thought to be related to hyperventilation .  No electrographic seizure or epileptiform discharges were seen       3/30/2015: CMRI:     IMPRESSION:  1.  Regadenoson stress perfusion: Normal perfusion at rest and  post-stress without evidence of inducible ischemia.  2.  Normal left ventricular size and systolic function with a  calculated ejection fraction of  67 %.  3.  Normal right ventricular size and systolic function with a  calculated ejection fraction of 65%.   4.  On delayed enhancement imaging, there is no abnormal  hyperenhancement to suggest " myocardial scar/inflammation/infiltration.  5.  There is normal origins and proximal course of the coronary  arteries.     The MRI sequences and imaging planes in this study were tailored for  cardiac imaging and are suboptimal for evaluation of non-cardiac  structures.     FINDINGS     Stress     Stress perfusion: Normal  Rest perfusion: Normal  Inducible ischemia: No   Stress- induced symptoms: No      Rest ECG: Sinus tachycardia  Rest BP: 104/75 mm Hg  Rest HR: 105 bpm     Stress ECG: Normal sinus rhythm   Stress BP: 98/61 mm Hg  Stress HR: 93 bpm     Left ventricle  Cavity size: Normal  Wall thickness: Normal  Global systolic function: Normal with a quantitative LV ejection  fraction of 67%.   Regional wall motion: Normal  Delayed enhancement: None     Right ventricle  Cavity size: Normal  Wall thickness: Normal  Global systolic function: Normal with a quantitative RV ejection  fraction of 65%.   Regional wall motion: Normal     Atria  LA size: Normal  RA size: Normal     Valves  Aortic Valve  Valve morphology: Tricuspid  Aortic stenosis: None  Aortic regurgitation: None     Mitral Valve  Mitral stenosis: None  Mitral regurgitation: None     Tricuspid Valve   Tricuspid stenosis: None  Tricuspid regurgitation: None     Pulmonic Valve  Pulmonic stenosis: None  Pulmonic regurgitation: None     Extracardiac  Aortic root dimension: 30 mm (at the sinuses of Valsalva)  Pericardial thickness: Normal  Pericardial effusion:None  Pleural effusion: None     Measurements     LEFT VENTRICULAR VOLUME RESULTS     ED volume:            120.23 ml               (96 - 174 ml)                 ED volume index:      70.56 ml/m2             (56 - 100 ml/m2)              ES volume:            39.45 ml                (27 - 71 ml)                  ES volume index:      23.15 ml/m2                                           Stroke volume:        80.78 ml                (61 - 111 ml)                 Stroke volume index:  47.41 ml/m2                                            Cardiac output:       5.07 l/min                                            Cardiac output index: 2.98 l/(m2*min)                                       Ejection fraction:    67.19 %                 (54 - 74 %)                   LVDd:                                 50.52 mm  LVDs:                                 32.61 mm  ASd:                                  5.96 mm   PWd:                                  5 mm      RIGHT VENTRICULAR VOLUME RESULTS      ED volume:            109.96 ml               (83 - 178 ml)                 ED volume index:      64.54 ml/m2             (47 - 103 ml/m2)              ES volume:            38.35 ml                (32 - 73 ml)                  ES volume index:      22.51 ml/m2                                           Stroke volume:        71.61 ml                (44 - 113 ml)                 Stroke volume index:  42.03 ml/m2                                           Cardiac output:       4.50 l/min                                            Cardiac output index: 2.64 l/(m2*min)                                       Ejection fraction:    65.12 %                 (49 - 70 %)            Sedation and contrast  Contrast agent: MultiHance  Volume administered: 20 mL     Pulse sequences used  SSFP Localizers  SSFP Cine  IR SSFP Single Shot  IR GRE Segmented  IV contrast prior to stress (0.075 mmol/kg) and rest (0.075 mmol/kg)  perfusion imaging  Regadenoson 0.4mg IV over 10 seconds before stress imaging  Hemodynamic monitoring before, during, and after study  Contrast-enhanced early and late enhancement imaging  ECG gated, respiratory navigated MRA of the coronary arteries  Image post-processing was performed on a [employer] workstation       Assessment and Plan:                 I very much appreciated the opportunity to see and assess this patient in the clinic with Cardiovascular Fellow        I agree with the above note which summarizes my findings and  current recommendations.      Please do not hesitate to contact my office if you have any questions or concerns.       Cornell Sosa MD  Cardiac Arrhythmia Service  [facility]  [phone]      CC

## 2018-04-12 NOTE — MR AVS SNAPSHOT
After Visit Summary   4/12/2018    Samara Oropeza    MRN: [id number]           Patient Information     Date Of Birth          1995        Visit Information        Provider Department      4/12/2018 3:30 PM Cornell Sosa MD [facility]        Today's Diagnoses     Orthostatic hypotension    -  1    Syncope, unspecified syncope type          Care Instructions      We encourage you to use My Chart as your primary form of communication if possible. If you need assistance in setting this up, please contact our office or ask at your follow up visit.     If you need a medication refill please contact your pharmacy. Please allow at least 3 business days for your refill to be completed.       Cardiology  Telephone Number    Cornelia Baugh [phone]   Or send a message to your provider via my chart.   For scheduling procedures:    [facility]    Clinic appointments       [phone]   For the Device Clinic (Pacemakers and ICD's)   RN's :   Ashtyn Parham  During business hours: [phone]    After business hours:   [phone]- select option 4 and ask for job code [id number].          As always, Thank you for trusting us with your health care needs!          Follow-ups after your visit        Your next 10 appointments already scheduled     Apr 13, 2018  3:00 PM CDT   Actigraphy Pickup with BK BED 5   [facility] [place]    [address] [place] [id number] [phone]            Apr 17, 2018 11:00 AM CDT   Return Visit with Austen Marquez MD   [facility] ([facility])    [address] [id number] [phone]            Apr 30, 2018  8:00 PM CDT   PSG Split with BK BED 1   [facility] [place]    [address] [place] [address]  [address]   [phone]            May 01, 2018  7:30 AM CDT   LAB with BK LAB   [facility] ([facility])    [address] [place] [address]   [phone]           Please do not eat 10-12 hours before your appointment if you are coming in fasting for labs on lipids, cholesterol, or glucose (sugar). This does not apply to pregnant women. Water, hot tea and black coffee (with nothing added) are okay. Do not drink other fluids, diet soda or chew gum.            May 01, 2018  8:00 AM CDT   Actigraphy Drop Off with BK SC DME   [facility] ([facility])    [address] [place] [address]   [phone]            May 01, 2018 10:45 AM CDT   Return Visit with Cata Hurst NP   [facility] ([facility])    [address] [place] [address]   [phone]            May 15, 2018 11:00 AM CDT   New Sleep Patient with Dashawn Colunga MD   [facility] ([facility])    [address] [place] [address]   [phone]            May 24, 2018  2:30 PM CDT   Return Sleep Patient with Kimo Gaston PsyD   [facility] [place]    [address] [place] [address]   [phone]            May 29, 2018  3:00 PM CDT   (Arrive by 2:45 PM)   Return Botox with Frederick Bender MD   [facility] ([facility])    [address] [place] [address]   [phone]            Aug 21, 2018 11:00 AM CDT   (Arrive by 10:45 AM)   Return Botox with Frederick Bender MD   [facility] ([facility])    [address] [place] [address]  "[facility] [address] [phone]              Who to contact     If you have questions or need follow up information about today's clinic visit or your schedule please contact [facility] directly at [phone].  Normal or non-critical lab and imaging results will be communicated to you by [employer], letter or phone within 4 business days after the clinic has received the results. If you do not hear from us within 7 days, please contact the clinic through DPSIt or phone. If you have a critical or abnormal lab result, we will notify you by phone as soon as possible.  Submit refill requests through Make My plate or call your pharmacy and they will forward the refill request to us. Please allow 3 business days for your refill to be completed.          Additional Information About Your Visit        Make My plate Information     Make My plate gives you secure access to your electronic health record. If you see a primary care provider, you can also send messages to your care team and make appointments. If you have questions, please call your primary care clinic.  If you do not have a primary care provider, please call [phone] and they will assist you.        Care EveryWhere ID     This is your Care EveryWhere ID. This could be used by other organizations to access your [facility] medical records  [id number]        Your Vitals Were     Pulse Height Pulse Oximetry BMI (Body Mass Index)          122 1.588 m (5' 2.5\") 96% 26.53 kg/m2         Blood Pressure from Last 3 Encounters:   04/12/18 124/81   04/05/18 116/80   03/28/18 120/74    Weight from Last 3 Encounters:   04/12/18 66.9 kg (147 lb 6.4 oz)   04/05/18 67.6 kg (149 lb)   03/28/18 67.9 kg (149 lb 11.2 oz)              Today, you had the following     No orders found for display         Today's Medication Changes          These changes are accurate as of 4/12/18  5:58 PM.  If you have any questions, ask your nurse or doctor.               Stop taking these " medicines if you haven't already. Please contact your care team if you have questions.     ondansetron 8 MG ODT tab   Commonly known as:  ZOFRAN-ODT   Stopped by:  Cornell Sosa MD                    Primary Care Provider Office Phone # Fax #    EVI Cardona [facility] [phone]       [address] [facility] [address]  [facility] [address]        Equal Access to Services     [facility] Center[facility]IL[facility] : Hadii aad ku hadasho Soomaali, waaxda luqadaha, qaybta kaalmada adeegyada, waxay idiin hayaan adeeg kharash la'aan . [facility] [phone].    ATENCIÓN: Si habla español, tiene a livingston disposición servicios gratuitos de asistencia lingüística. [facility] [phone].    We comply with applicable federal civil rights laws and [place] laws. We do not discriminate on the basis of race, color, national origin, age, disability, sex, sexual orientation, or gender identity.            Thank you!     Thank you for choosing [facility]  for your care. Our goal is always to provide you with excellent care. Hearing back from our patients is one way we can continue to improve our services. Please take a few minutes to complete the written survey that you may receive in the mail after your visit with us. Thank you!             Your Updated Medication List - Protect others around you: Learn how to safely use, store and throw away your medicines at www.disposemymeds.org.          This list is accurate as of 4/12/18  5:58 PM.  Always use your most recent med list.                   Brand Name Dispense Instructions for use Diagnosis    albuterol 108 (90 Base) MCG/ACT Inhaler    PROAIR HFA/PROVENTIL HFA/VENTOLIN HFA    1 Inhaler    Inhale 2 puffs into the lungs every 6 hours as needed for shortness of breath / dyspnea or wheezing    Atypical chest pain       amitriptyline 50 MG tablet    ELAVIL    30 tablet    Take 1 tablet (50 mg) by mouth At Bedtime    Abdominal pain, generalized, Insomnia due to medical condition        apremilast 30 MG tablet    OTEZLA     Take 1 tablet (30 mg) by mouth 2 times daily    Behcet's disease (H)       ASPIRIN CHILDRENS 81 MG chewable tablet   Generic drug:  aspirin      Take 81 mg by mouth as needed        aspirin-acetaminophen-caffeine 250-250-65 MG per tablet    EXCEDRIN MIGRAINE     Take 1 tablet by mouth every 6 hours as needed for headaches        benzocaine 20 % Gel    TOPICALE XTRA    30 g    Apply as needed locally to mouth or nasal ulcers for pain; 4 times daily as needed    Behcet's disease (H)       betamethasone valerate 0.1 % cream    VALISONE     Apply topically 2 times daily        * BOTOX IJ      Inject 165 Units as directed once Lot#: /C3 Exp: 10/2020        * BOTOX IJ      Inject 165 Units as directed once Lot # /C3  Exp:  10/2020        * botulinum toxin type A 100 units injection    BOTOX    200 Units    Inject 200 Units into the muscle every 3 months    Cervical dystonia       * BOTOX IJ      Inject 165 Units into the muscle once Lot /C3 Exp 06/2020        carbamide peroxide 6.5 % otic solution    DEBROX     5 drops 2 times daily        clobetasol 0.05 % cream    TEMOVATE    60 g    Apply topically 2 times daily    Folliculitis       colchicine 0.6 MG tablet     270 tablet    TAKE 2 TABLETS BY MOUTH EVERY MORNING AND 1 TABLET EVERY EVENING.    Behcet's disease (H)       COMPOUNDED NON-CONTROLLED SUBSTANCE - PHARMACY TO MIX COMPOUNDED MEDICATION    CMPD RX    30 capsule    Take one capsule in the morning    Fibromyalgia       cyproheptadine 4 MG tablet    PERIACTIN    30 tablet    Take 1 tablet (4 mg) by mouth At Bedtime For nightmares    Nightmares       diclofenac 1 % Gel topical gel    VOLTAREN    100 g    Apply 2-4 grams to affected area(s) up to 4 times per day as needed. This is an anti-inflammatory medication.    Polyarthralgia       dicyclomine 20 MG tablet    BENTYL    120 tablet    Take 1 tablet (20 mg) by mouth 4 times daily as needed    Abdominal cramping        diltiazem 2% in PLO cream (FV COMPOUNDED) 2% Gel     30 g    Apply small pea size amount three times daily to anus until pain is gone.    Anal fissure       diphenhydrAMINE 25 MG tablet    BENADRYL    30 tablet    Take 1 tablet (25 mg) by mouth every 8 hours as needed for allergies        EPINEPHrine 0.3 MG/0.3ML injection 2-pack    EPIPEN 2-EAMON    0.6 mL    Inject 0.3 mLs (0.3 mg) into the muscle as needed for anaphylaxis    Hx of bee sting allergy       * guanFACINE 1 MG tablet    TENEX    270 tablet    Take 3 tablets (3 mg) by mouth 2 times daily    Tic       * guanFACINE 1 MG tablet    TENEX    180 tablet    Take 3 tablets (3 mg) by mouth twice daily morning and evening.    Tic       hydrOXYzine 25 MG tablet    ATARAX    90 tablet    Take 1-2 tablets (25-50 mg) by mouth every 6 hours as needed for anxiety    TAHIR (generalized anxiety disorder)       hyoscyamine 0.125 MG tablet    ANASPAZ/LEVSIN    40 tablet    Take 1-2 tablets (125-250 mcg) by mouth every 4 hours as needed for cramping    Abdominal pain, generalized       hypromellose 0.3 % Soln ophthalmic solution    GENTEAL     1 drop every hour as needed for dry eyes        LACTAID PO      Take by mouth daily        lactulose 20 GM/30ML Soln     300 mL    Take 30 mLs by mouth 3 times daily as needed    Constipation, unspecified constipation type       lidocaine 2 % topical gel    XYLOCAINE     Apply 1 Tube topically daily Reported on 4/21/2017        lidocaine visc 2% 2.5mL/5mL & maalox/mylanta w/ simeth 2.5mL/5mL & diphenhydrAMINE 5mg/5mL Susp suspension    [facility]    1 Bottle    Swish and swallow 10 mLs in mouth every 6 hours as needed for mouth sores    Behcet's syndrome (H)       linaclotide 145 MCG capsule    LINZESS    90 capsule    Take 1 capsule (145 mcg) by mouth every morning (before breakfast)    Chronic idiopathic constipation       LORazepam 1 MG tablet    ATIVAN    60 tablet    Take 0.5 tablets (0.5 mg) by mouth 2 times  daily as needed for other (atypical chest pain) Do not operate a vehicle after taking this medication    Chronic abdominal pain       lubiprostone 24 MCG capsule    AMITIZA    30 capsule    Take 1 capsule (24 mcg) by mouth 2 times daily (with meals)    Chronic idiopathic constipation       omeprazole 40 MG capsule    priLOSEC    90 capsule    Take 1 capsule (40 mg) by mouth daily    Gastroesophageal reflux disease, esophagitis presence not specified       ondansetron 8 MG tablet    ZOFRAN    20 tablet    Take 1 tablet (8 mg) by mouth every 8 hours as needed for nausea    Nausea       SPRINTEC 28 0.25-35 MG-MCG per tablet   Generic drug:  norgestimate-ethinyl estradiol     84 tablet    TAKE 1 TABLET BY MOUTH ONCE DAILY.    General counseling for prescription of oral contraceptives       sucralfate 1 GM/10ML suspension    CARAFATE    1200 mL    Take 10 mLs (1 g) by mouth 4 times daily    Bile reflux gastritis, Nausea       triamcinolone 0.1 % cream    KENALOG    15 g    Apply sparingly to oral ulcers three times daily for 14 days as needed.    Behcet's syndrome (H)       * Notice:  This list has 6 medication(s) that are the same as other medications prescribed for you. Read the directions carefully, and ask your doctor or other care provider to review them with you.

## 2018-04-12 NOTE — NURSING NOTE
Chief Complaint   Patient presents with     New Patient     ortho b/p, (EKG 3/8/18), ref by Dr Robles for syncope, (Dr Chilel 1/13/16)- Tilt/EEG ,CMRI completed     Vitals were taken and medications were reconciled. Orthostatic BP     KRISHNA Blue  3:53 PM

## 2018-04-12 NOTE — PROGRESS NOTES
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  Since about 2014 or 2015 she has passing out episodes. She has them on weekly basis. Last week she says she passed out 4 times. Each time was at the end of a dance exercise routine. She felt lightheaded at the end of exercise routine and passed out. At other times she gets lighthteaded when getting out bed immediately and gets blurry vision,and passes out.  Her significant other who has witnessed some of these episodes says that her eyes are squinted and she has shaking of her hands. The longest that she was out was for maybe 3 minutes but usually she is out for about a minute. He described a recent episode where she was sitting in a car she felt numbness in her arms. When she got out of the car she felt dizzy and passed out.  She was seen by Dr. Chilel in 2016 who felt that these spells were vasovagal and recommended increasing salt and fluid intake.  She was also seen by Dr. Benítez when she was hospitalized in December 2017 for a spell. She had a tilt table study which was interpreted as possible vagal response and increased fluids and salt intake was recommended.  Patient does not add extra salt in her food. She drinks water during exercise. She feels that she did comply with the above recommendations in the past and they didn't help her. She is concerned about her heart and was hoping to get a cardiac monitor.    She had 9 day [employer] cardiac monitor in March where she was symptomatic (dizzy, chest pain, short of breath) with both NSR and sinus tachy episodes. These monitor had to be removed early because she had local skin reaction to it.    Today we reviewed several types of her spells  1. After stopping Cardio  2. After getting up from supine or seated  3. Other when weak in car and helped to house by boyfriend  "and father. Often feels \"hot\" before and \"tired \" after. Boyfriend concurred.  History suggests VVS component.  4. She has episodes that last only 1-3 min. However during the HUT l;ast December she had an event without EEG change that suggested a psychogenic pseudo faint    The first 2 events seem very likely postural and the #3 is most likelyVVS. All could be improved with salt/electrolyte volume / and isometric exercise with standing training. She did not wish any of these ideas and said that she was here because of her \"heart \" and wanted a heart monitor. I tried to explain that we know that she has tachy on arising and that our ideas might help. She was adverse to these ideas.  Her boyfriend encouraged her to leave without further consideration of our suggestions./    PAST MEDICAL HISTORY:  Past Medical History:   Diagnosis Date     Anxiety      Arthritis      Behcet's disease (H)      Cervical adenitis May 2010     Chronic abdominal pain      Constipation, chronic 1994     Gastro-oesophageal reflux disease      Gastroparesis      Migraines      Neuromuscular disorder (H)     fibramyalgia     Palpitations      Seizure (H)      Seizures (H)     unknown etiology     Syncope      Tourette's        FAMILY HISTORY:  Family History   Problem Relation Age of Onset     Depression Mother      Neurologic Disorder Mother      Migraines, take imitrex injection.  Also in maternal grandmother.       Alcohol/Drug Father      Hypertension Father      Depression Father      Cardiovascular Maternal Grandmother      Depression Maternal Grandmother      Hypertension Maternal Grandmother      Alzheimer Disease Maternal Grandmother      Cardiovascular Maternal Grandfather      Hypertension Maternal Grandfather      Depression Maternal Grandfather      Alcohol/Drug Maternal Grandfather      Cardiovascular Paternal Grandmother      Hypertension Paternal Grandmother      Cardiovascular Paternal Grandfather      Hypertension Paternal " "Grandfather      Glaucoma No family hx of      Macular Degeneration No family hx of        SOCIAL HISTORY:  Social History     Social History     Marital status: Single     Spouse name: N/A     Number of children: N/A     Years of education: N/A     Social History Main Topics     Smoking status: Never Smoker     Smokeless tobacco: Never Used     Alcohol use 0.6 oz/week     1 Standard drinks or equivalent per week      Comment: rarely     Drug use: No     Sexual activity: Yes     Partners: Male     Birth control/ protection: Pill     Other Topics Concern     None     Social History Narrative    Boyfriend of 5 years, living with \"in laws\" Oct 2017 and looking forward to their own apartment.        CURRENT MEDICATIONS:  Current Outpatient Prescriptions   Medication Sig Dispense Refill     guanFACINE (TENEX) 1 MG tablet Take 3 tablets (3 mg) by mouth twice daily morning and evening. 180 tablet 1     ondansetron (ZOFRAN) 8 MG tablet Take 1 tablet (8 mg) by mouth every 8 hours as needed for nausea 20 tablet 1     diclofenac (VOLTAREN) 1 % GEL topical gel Apply 2-4 grams to affected area(s) up to 4 times per day as needed. This is an anti-inflammatory medication. 100 g 4     hyoscyamine (ANASPAZ/LEVSIN) 0.125 MG tablet Take 1-2 tablets (125-250 mcg) by mouth every 4 hours as needed for cramping 40 tablet 1     OnabotulinumtoxinA (BOTOX IJ) Inject 165 Units as directed once Lot#: /C3  Exp: 10/2020       OnabotulinumtoxinA (BOTOX IJ) Inject 165 Units as directed once Lot # /C3   Exp:  10/2020       hydrOXYzine (ATARAX) 25 MG tablet Take 1-2 tablets (25-50 mg) by mouth every 6 hours as needed for anxiety 90 tablet 2     cyproheptadine (PERIACTIN) 4 MG tablet Take 1 tablet (4 mg) by mouth At Bedtime For nightmares 30 tablet 2     lubiprostone (AMITIZA) 24 MCG capsule Take 1 capsule (24 mcg) by mouth 2 times daily (with meals) 30 capsule 1     benzocaine (TOPICALE XTRA) 20 % GEL Apply as needed locally to mouth or " nasal ulcers for pain; 4 times daily as needed 30 g 1     colchicine 0.6 MG tablet TAKE 2 TABLETS BY MOUTH EVERY MORNING AND 1 TABLET EVERY EVENING. 270 tablet 0     apremilast (OTEZLA) 30 MG tablet Take 1 tablet (30 mg) by mouth 2 times daily       linaclotide (LINZESS) 145 MCG capsule Take 1 capsule (145 mcg) by mouth every morning (before breakfast) 90 capsule 1     dicyclomine (BENTYL) 20 MG tablet Take 1 tablet (20 mg) by mouth 4 times daily as needed 120 tablet 3     aspirin-acetaminophen-caffeine (EXCEDRIN MIGRAINE) 250-250-65 MG per tablet Take 1 tablet by mouth every 6 hours as needed for headaches       SPRINTEC 28 0.25-35 MG-MCG per tablet TAKE 1 TABLET BY MOUTH ONCE DAILY. 84 tablet 2     OnabotulinumtoxinA (BOTOX IJ) Inject 165 Units into the muscle once Lot /C3  Exp 06/2020       guanFACINE (TENEX) 1 MG tablet Take 3 tablets (3 mg) by mouth 2 times daily 270 tablet 3     diltiazem 2% in PLO cream, FV COMPOUNDED, 2% GEL Apply small pea size amount three times daily to anus until pain is gone. 30 g 1     COMPOUNDED NON-CONTROLLED SUBSTANCE (CMPD RX) - PHARMACY TO MIX COMPOUNDED MEDICATION Take one capsule in the morning 30 capsule 0     amitriptyline (ELAVIL) 50 MG tablet Take 1 tablet (50 mg) by mouth At Bedtime 30 tablet 11     lactulose 20 GM/30ML SOLN Take 30 mLs by mouth 3 times daily as needed 300 mL 3     diphenhydrAMINE (BENADRYL) 25 MG tablet Take 1 tablet (25 mg) by mouth every 8 hours as needed for allergies 30 tablet 0     sucralfate (CARAFATE) 1 GM/10ML suspension Take 10 mLs (1 g) by mouth 4 times daily 1200 mL 2     aspirin (ASPIRIN CHILDRENS) 81 MG chewable tablet Take 81 mg by mouth as needed        omeprazole (PRILOSEC) 40 MG capsule Take 1 capsule (40 mg) by mouth daily 90 capsule 3     EPINEPHrine (EPIPEN 2-EAMON) 0.3 MG/0.3ML injection Inject 0.3 mLs (0.3 mg) into the muscle as needed for anaphylaxis 0.6 mL 3     albuterol (PROAIR HFA/PROVENTIL HFA/VENTOLIN HFA) 108 (90 BASE)  MCG/ACT Inhaler Inhale 2 puffs into the lungs every 6 hours as needed for shortness of breath / dyspnea or wheezing 1 Inhaler 1     Magic Mouthwash (FV std formula) lidocaine visc 2% 2.5mL/5mL & maalox/mylanta w/ simeth 2.5mL/5mL & diphenhydrAMINE 5mg/5mL Swish and swallow 10 mLs in mouth every 6 hours as needed for mouth sores 1 Bottle 1     clobetasol (TEMOVATE) 0.05 % cream Apply topically 2 times daily 60 g 0     botulinum toxin type A (BOTOX) 100 UNITS injection Inject 200 Units into the muscle every 3 months 200 Units 3     betamethasone valerate (VALISONE) 0.1 % cream Apply topically 2 times daily       carbamide peroxide (DEBROX) 6.5 % otic solution 5 drops 2 times daily       Lactase (LACTAID PO) Take by mouth daily       lidocaine (XYLOCAINE) 2 % jelly Apply 1 Tube topically daily Reported on 4/21/2017       triamcinolone (KENALOG) 0.1 % cream Apply sparingly to oral ulcers three times daily for 14 days as needed. 15 g 1     LORazepam (ATIVAN) 1 MG tablet Take 0.5 tablets (0.5 mg) by mouth 2 times daily as needed for other (atypical chest pain) Do not operate a vehicle after taking this medication (Patient not taking: Reported on 4/12/2018) 60 tablet 0     hypromellose (GENTEAL) 0.3 % SOLN 1 drop every hour as needed for dry eyes          ROS:   Answers for HPI/ROS submitted by the patient on 4/6/2018   General Symptoms: Yes  Skin Symptoms: Yes  HENT Symptoms: Yes  EYE SYMPTOMS: Yes  HEART SYMPTOMS: Yes  LUNG SYMPTOMS: Yes  INTESTINAL SYMPTOMS: Yes  URINARY SYMPTOMS: No  GYNECOLOGIC SYMPTOMS: No  BREAST SYMPTOMS: No  SKELETAL SYMPTOMS: Yes  BLOOD SYMPTOMS: Yes  NERVOUS SYSTEM SYMPTOMS: Yes  MENTAL HEALTH SYMPTOMS: Yes  Fever: Yes  Loss of appetite: Yes  Weight loss: Yes  Weight gain: Yes  Fatigue: Yes  Night sweats: Yes  Chills: Yes  Increased stress: Yes  Excessive hunger: No  Excessive thirst: Yes  Feeling hot or cold when others believe the temperature is normal: Yes  Loss of height: Yes  Post-operative  complications: No  Surgical site pain: No  Hallucinations: No  Change in or Loss of Energy: Yes  Hyperactivity: No  Confusion: Yes  Changes in hair: Yes  Changes in moles/birth marks: No  Itching: No  Rashes: Yes  Changes in nails: Yes  Acne: No  Hair in places you don't want it: No  Change in facial hair: No  Warts: No  Non-healing sores: Yes  Scarring: Yes  Flaking of skin: No  Color changes of hands/feet in cold : Yes  Sun sensitivity: Yes  Skin thickening: No  Ear pain: Yes  Ear discharge: No  Hearing loss: No  Tinnitus: Yes  Nosebleeds: Yes  Congestion: Yes  Sinus pain: Yes  Trouble swallowing: Yes   Voice hoarseness: Yes  Mouth sores: Yes  Sore throat: Yes  Tooth pain: Yes  Gum tenderness: Yes  Bleeding gums: Yes  Change in taste: Yes  Change in sense of smell: Yes  Dry mouth: Yes  Hearing aid used: No  Neck lump: No  Eye pain: Yes  Vision loss: No  Dry eyes: Yes  Watery eyes: Yes  Eye bulging: No  Double vision: Yes  Flashing of lights: Yes  Spots: Yes  Floaters: Yes  Redness: Yes  Crossed eyes: No  Tunnel Vision: Yes  Yellowing of eyes: No  Eye irritation: Yes  Cough: No  Sputum or phlegm: No  Coughing up blood: No  Difficulty breating or shortness of breath: Yes  Snoring: No  Wheezing: No  Difficulty breathing on exertion: No  Nighttime Cough: No  Difficulty breathing when lying flat: Yes  Chest pain or pressure: Yes  Fast or irregular heartbeat: Yes  Pain in legs with walking: Yes  Trouble breathing while lying down: Yes  Fingers or toes appear blue: Yes  High blood pressure: Yes  Low blood pressure: Yes  Fainting: Yes  Murmurs: No  Pacemaker: No  Varicose veins: No  Wake up at night with shortness of breath: Yes  Light-headedness: Yes  Exercise intolerance: Yes  Heart burn or indigestion: Yes  Nausea: Yes  Vomiting: Yes  Abdominal pain: Yes  Bloating: Yes  Constipation: Yes  Diarrhea: Yes  Blood in stool: No  Black stools: No  Rectal or Anal pain: No  Fecal incontinence: No  Yellowing of skin or eyes:  "No  Vomit with blood: No  Change in stools: Yes  Back pain: Yes  Muscle aches: Yes  Neck pain: Yes  Swollen joints: Yes  Joint pain: Yes  Bone pain: Yes  Muscle cramps: Yes  Muscle weakness: Yes  Joint stiffness: Yes  Bone fracture: No  Anemia: Yes  Swollen glands: No  Easy bleeding or bruising: Yes  Trouble with coordination: Yes  Dizziness or trouble with balance: Yes  Fainting or black-out spells: Yes  Memory loss: Yes  Headache: Yes  Seizures: No  Speech problems: Yes  Tingling: Yes  Tremor: Yes  Weakness: Yes  Difficulty walking: Yes  Paralysis: Yes  Numbness: Yes  Nervous or Anxious: Yes  Depression: No  Trouble sleeping: Yes  Trouble thinking or concentrating: Yes  Mood changes: Yes  Panic attacks: Yes      EXAM:  /81 (BP Location: Right arm, Patient Position: Standing, Cuff Size: Adult Regular)  Pulse 122  Ht 1.588 m (5' 2.5\")  Wt 66.9 kg (147 lb 6.4 oz)  SpO2 96%  BMI 26.53 kg/m2  General: appears comfortable, alert and articulate  Head: normocephalic, atraumatic  Eyes: anicteric sclera, EOMI  Neck: no adenopathy  Orophyarynx: moist mucosa, no lesions, dentition intact  Heart: regular, S1/S2, no murmur, gallop, rub, estimated JVP 6cm  Lungs: clear, no rales or wheezing  Abdomen: soft, non-tender, bowel sounds present, no hepatosplenomegaly  Extremities: no clubbing, cyanosis or edema  Neurological: normal speech and affect, no gross motor deficits    Labs:  CBC RESULTS:  Lab Results   Component Value Date    WBC 5.8 01/17/2018    RBC 4.29 01/17/2018    HGB 11.9 01/17/2018    HCT 37.7 01/17/2018    MCV 88 01/17/2018    MCH 27.7 01/17/2018    MCHC 31.6 01/17/2018    RDW 13.7 01/17/2018     01/17/2018       CMP RESULTS:  Lab Results   Component Value Date     01/08/2018    POTASSIUM 3.8 01/08/2018    CHLORIDE 106 01/08/2018    CO2 26 01/08/2018    ANIONGAP 8 01/08/2018    GLC 93 01/08/2018    BUN 8 01/08/2018    CR 0.72 01/08/2018    GFRESTIMATED >90 01/08/2018    GFRESTBLACK >90 " 01/08/2018    SHANKAR 9.0 01/08/2018    BILITOTAL 0.4 01/08/2018    ALBUMIN 3.8 01/08/2018    ALKPHOS 91 01/08/2018    ALT 34 01/08/2018    AST 26 01/08/2018        INR RESULTS:  Lab Results   Component Value Date    INR 0.99 11/06/2016       Lab Results   Component Value Date    MAG 1.7 11/26/2016     No results found for: NTBNPI  No results found for: NTBNP    EKG March 2018: NSR    9 day cardiac monitor in March 2018: She was symptomatic (dizzy, chest pain, short of breath) with both NSR and sinus tachy episodes.    Echo 2015: Normal LV size and function EF 60%, Normal RV size and function. No valvular dysfunction.    Assessment and Plan:   # Autonomic dysfunction>>>Complex and multifaceted  # Probable OH (immediate and post-exertion). Poss some POTs component  # Probable VVS at times  # Possibly also some psychogenic pseudosyncope component that is stress related, but not proven    - Recommended isometric exercise, increasing salt and fluid intake including using Pedialyte  - Unfortunately she and her significant other felt that these therapeutic lifestyle changes would not be helpful to her since she had tried them in the past and they did not seem to help. They also wanted to her get a cardiac monitor while we felt that her cardiac workup in the past including echo and most recent cardiac monitor in March 2018 was unremarkable.  Also had increase HR with standing on exam today  - They left the clinic upset that we did not offer new ideas or deal with all her problems    Staffed with Dr. Praneeth Kim  General Cardiology Fellow, PGY5  Pager [phone]    I very much appreciated the opportunity to see and assess Samara Oropeza in the clinic with CV Fellow Dr Kim  Please do not hesitate to contact my office if you have any questions or concerns.      Cornell Sosa MD  Cardiac Arrhythmia Service  [facility]  [phone]      CC  Patient Care Team:  Sonja Abreu APRN CNP as PCP -  General (Nurse Practitioner)  Jennifer Acevedo MD as MD (Orthopedics)  Lilliana Miramontes APRN CNP as Nurse Practitioner (Nurse Practitioner)  Shagufta Austin, RN as Clinic Care Coordinator (Gastroenterology)  Cristy Russell, RN as Nurse Coordinator (Neurology)  Carrie Ngo, JESSY as Nurse Coordinator (Neurology)  Austen Marquez MD as MD (Rheumatology)  Umer Heaton MD as MD (Ophthalmology)  Suzy Harman MD as MD (Family Medicine - Sports Medicine)  Esau Elliott MD as MD (Dermatology)  Frederick Bender MD as MD (Physical Medicine and Rehab)  Vidya Bryson, RN as Clinic Care Coordinator (Physical Medicine and Rehab)  Marcelle Richardson, PT as Physical Therapist (Physical Medicine and Rehab)  Cata Hurst, NP as Nurse Practitioner (Nurse Practitioner Psych/Mental Health)  HUSSEIN Callahan MD as MD (Cardiology)  HUSSEIN CALLAHAN

## 2018-04-12 NOTE — LETTER
4/12/2018      RE: Samara Oropeza  [address]   [place] [address]       Dear Colleague,    Thank you for the opportunity to participate in the care of your patient, Samara Oropeza, at the [facility]. Please see a copy of my visit note below.    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  Since about 2014 or 2015 she has passing out episodes. She has them on weekly basis. Last week she says she passed out 4 times. Each time was at the end of a dance exercise routine. She felt lightheaded at the end of exercise routine and passed out. At other times she gets lighthteaded when getting out bed immediately and gets blurry vision,and passes out.  Her significant other who has witnessed some of these episodes says that her eyes are squinted and she has shaking of her hands. The longest that she was out was for maybe 3 minutes but usually she is out for about a minute. He described a recent episode where she was sitting in a car she felt numbness in her arms. When she got out of the car she felt dizzy and passed out.  She was seen by Dr. Chilel in 2016 who felt that these spells were vasovagal and recommended increasing salt and fluid intake.  She was also seen by Dr. Benítez when she was hospitalized in December 2017 for a spell. She had a tilt table study which was interpreted as possible vagal response and increased fluids and salt intake was recommended.  Patient does not add extra salt in her food. She drinks water during exercise. She feels that she did comply with the above recommendations in the past and they didn't help her. She is concerned about her heart and was hoping to get a cardiac monitor.    She had 9 day [employer] cardiac monitor in March where she was symptomatic (dizzy,  "chest pain, short of breath) with both NSR and sinus tachy episodes. These monitor had to be removed early because she had local skin reaction to it.    Today we reviewed several types of her spells  1. After stopping Cardio  2. After getting up from supine or seated  3. Other when weak in car and helped to house by boyfriend and father. Often feels \"hot\" before and \"tired \" after. Boyfriend concurred.  History suggests VVS component.  4. She has episodes that last only 1-3 min. However during the HUT l;ast December she had an event without EEG change that suggested a psychogenic pseudo faint    The first 2 events seem very likely postural and the #3 is most likelyVVS. All could be improved with salt/electrolyte volume / and isometric exercise with standing training. She did not wish any of these ideas and said that she was here because of her \"heart \" and wanted a heart monitor. I tried to explain that we know that she has tachy on arising and that our ideas might help. She was adverse to these ideas.  Her boyfriend encouraged her to leave without further consideration of our suggestions./    PAST MEDICAL HISTORY:  Past Medical History:   Diagnosis Date     Anxiety      Arthritis      Behcet's disease (H)      Cervical adenitis May 2010     Chronic abdominal pain      Constipation, chronic 1994     Gastro-oesophageal reflux disease      Gastroparesis      Migraines      Neuromuscular disorder (H)     fibramyalgia     Palpitations      Seizure (H)      Seizures (H)     unknown etiology     Syncope      Tourette's        FAMILY HISTORY:  Family History   Problem Relation Age of Onset     Depression Mother      Neurologic Disorder Mother      Migraines, take imitrex injection.  Also in maternal grandmother.       Alcohol/Drug Father      Hypertension Father      Depression Father      Cardiovascular Maternal Grandmother      Depression Maternal Grandmother      Hypertension Maternal Grandmother      Alzheimer Disease " "Maternal Grandmother      Cardiovascular Maternal Grandfather      Hypertension Maternal Grandfather      Depression Maternal Grandfather      Alcohol/Drug Maternal Grandfather      Cardiovascular Paternal Grandmother      Hypertension Paternal Grandmother      Cardiovascular Paternal Grandfather      Hypertension Paternal Grandfather      Glaucoma No family hx of      Macular Degeneration No family hx of        SOCIAL HISTORY:  Social History     Social History     Marital status: Single     Spouse name: N/A     Number of children: N/A     Years of education: N/A     Social History Main Topics     Smoking status: Never Smoker     Smokeless tobacco: Never Used     Alcohol use 0.6 oz/week     1 Standard drinks or equivalent per week      Comment: rarely     Drug use: No     Sexual activity: Yes     Partners: Male     Birth control/ protection: Pill     Other Topics Concern     None     Social History Narrative    Boyfriend of 5 years, living with \"in laws\" Oct 2017 and looking forward to their own apartment.        CURRENT MEDICATIONS:  Current Outpatient Prescriptions   Medication Sig Dispense Refill     guanFACINE (TENEX) 1 MG tablet Take 3 tablets (3 mg) by mouth twice daily morning and evening. 180 tablet 1     ondansetron (ZOFRAN) 8 MG tablet Take 1 tablet (8 mg) by mouth every 8 hours as needed for nausea 20 tablet 1     diclofenac (VOLTAREN) 1 % GEL topical gel Apply 2-4 grams to affected area(s) up to 4 times per day as needed. This is an anti-inflammatory medication. 100 g 4     hyoscyamine (ANASPAZ/LEVSIN) 0.125 MG tablet Take 1-2 tablets (125-250 mcg) by mouth every 4 hours as needed for cramping 40 tablet 1     OnabotulinumtoxinA (BOTOX IJ) Inject 165 Units as directed once Lot#: /C3  Exp: 10/2020       OnabotulinumtoxinA (BOTOX IJ) Inject 165 Units as directed once Lot # /C3   Exp:  10/2020       hydrOXYzine (ATARAX) 25 MG tablet Take 1-2 tablets (25-50 mg) by mouth every 6 hours as needed for " anxiety 90 tablet 2     cyproheptadine (PERIACTIN) 4 MG tablet Take 1 tablet (4 mg) by mouth At Bedtime For nightmares 30 tablet 2     lubiprostone (AMITIZA) 24 MCG capsule Take 1 capsule (24 mcg) by mouth 2 times daily (with meals) 30 capsule 1     benzocaine (TOPICALE XTRA) 20 % GEL Apply as needed locally to mouth or nasal ulcers for pain; 4 times daily as needed 30 g 1     colchicine 0.6 MG tablet TAKE 2 TABLETS BY MOUTH EVERY MORNING AND 1 TABLET EVERY EVENING. 270 tablet 0     apremilast (OTEZLA) 30 MG tablet Take 1 tablet (30 mg) by mouth 2 times daily       linaclotide (LINZESS) 145 MCG capsule Take 1 capsule (145 mcg) by mouth every morning (before breakfast) 90 capsule 1     dicyclomine (BENTYL) 20 MG tablet Take 1 tablet (20 mg) by mouth 4 times daily as needed 120 tablet 3     aspirin-acetaminophen-caffeine (EXCEDRIN MIGRAINE) 250-250-65 MG per tablet Take 1 tablet by mouth every 6 hours as needed for headaches       SPRINTEC 28 0.25-35 MG-MCG per tablet TAKE 1 TABLET BY MOUTH ONCE DAILY. 84 tablet 2     OnabotulinumtoxinA (BOTOX IJ) Inject 165 Units into the muscle once Lot /C3  Exp 06/2020       guanFACINE (TENEX) 1 MG tablet Take 3 tablets (3 mg) by mouth 2 times daily 270 tablet 3     diltiazem 2% in PLO cream, FV COMPOUNDED, 2% GEL Apply small pea size amount three times daily to anus until pain is gone. 30 g 1     COMPOUNDED NON-CONTROLLED SUBSTANCE (CMPD RX) - PHARMACY TO MIX COMPOUNDED MEDICATION Take one capsule in the morning 30 capsule 0     amitriptyline (ELAVIL) 50 MG tablet Take 1 tablet (50 mg) by mouth At Bedtime 30 tablet 11     lactulose 20 GM/30ML SOLN Take 30 mLs by mouth 3 times daily as needed 300 mL 3     diphenhydrAMINE (BENADRYL) 25 MG tablet Take 1 tablet (25 mg) by mouth every 8 hours as needed for allergies 30 tablet 0     sucralfate (CARAFATE) 1 GM/10ML suspension Take 10 mLs (1 g) by mouth 4 times daily 1200 mL 2     aspirin (ASPIRIN CHILDRENS) 81 MG chewable tablet  "Take 81 mg by mouth as needed        omeprazole (PRILOSEC) 40 MG capsule Take 1 capsule (40 mg) by mouth daily 90 capsule 3     EPINEPHrine (EPIPEN 2-EAMON) 0.3 MG/0.3ML injection Inject 0.3 mLs (0.3 mg) into the muscle as needed for anaphylaxis 0.6 mL 3     albuterol (PROAIR HFA/PROVENTIL HFA/VENTOLIN HFA) 108 (90 BASE) MCG/ACT Inhaler Inhale 2 puffs into the lungs every 6 hours as needed for shortness of breath / dyspnea or wheezing 1 Inhaler 1     Magic Mouthwash (FV std formula) lidocaine visc 2% 2.5mL/5mL & maalox/mylanta w/ simeth 2.5mL/5mL & diphenhydrAMINE 5mg/5mL Swish and swallow 10 mLs in mouth every 6 hours as needed for mouth sores 1 Bottle 1     clobetasol (TEMOVATE) 0.05 % cream Apply topically 2 times daily 60 g 0     botulinum toxin type A (BOTOX) 100 UNITS injection Inject 200 Units into the muscle every 3 months 200 Units 3     betamethasone valerate (VALISONE) 0.1 % cream Apply topically 2 times daily       carbamide peroxide (DEBROX) 6.5 % otic solution 5 drops 2 times daily       Lactase (LACTAID PO) Take by mouth daily       lidocaine (XYLOCAINE) 2 % jelly Apply 1 Tube topically daily Reported on 4/21/2017       triamcinolone (KENALOG) 0.1 % cream Apply sparingly to oral ulcers three times daily for 14 days as needed. 15 g 1     LORazepam (ATIVAN) 1 MG tablet Take 0.5 tablets (0.5 mg) by mouth 2 times daily as needed for other (atypical chest pain) Do not operate a vehicle after taking this medication (Patient not taking: Reported on 4/12/2018) 60 tablet 0     hypromellose (GENTEAL) 0.3 % SOLN 1 drop every hour as needed for dry eyes        ROS:   Answers for HPI/ROS submitted by the patient on 4/6/2018     EXAM:  /81 (BP Location: Right arm, Patient Position: Standing, Cuff Size: Adult Regular)  Pulse 122  Ht 1.588 m (5' 2.5\")  Wt 66.9 kg (147 lb 6.4 oz)  SpO2 96%  BMI 26.53 kg/m2  General: appears comfortable, alert and articulate  Head: normocephalic, atraumatic  Eyes: anicteric " sclera, EOMI  Neck: no adenopathy  Orophyarynx: moist mucosa, no lesions, dentition intact  Heart: regular, S1/S2, no murmur, gallop, rub, estimated JVP 6cm  Lungs: clear, no rales or wheezing  Abdomen: soft, non-tender, bowel sounds present, no hepatosplenomegaly  Extremities: no clubbing, cyanosis or edema  Neurological: normal speech and affect, no gross motor deficits    Labs:  CBC RESULTS:  Lab Results   Component Value Date    WBC 5.8 01/17/2018    RBC 4.29 01/17/2018    HGB 11.9 01/17/2018    HCT 37.7 01/17/2018    MCV 88 01/17/2018    MCH 27.7 01/17/2018    MCHC 31.6 01/17/2018    RDW 13.7 01/17/2018     01/17/2018     CMP RESULTS:  Lab Results   Component Value Date     01/08/2018    POTASSIUM 3.8 01/08/2018    CHLORIDE 106 01/08/2018    CO2 26 01/08/2018    ANIONGAP 8 01/08/2018    GLC 93 01/08/2018    BUN 8 01/08/2018    CR 0.72 01/08/2018    GFRESTIMATED >90 01/08/2018    GFRESTBLACK >90 01/08/2018    SHANKAR 9.0 01/08/2018    BILITOTAL 0.4 01/08/2018    ALBUMIN 3.8 01/08/2018    ALKPHOS 91 01/08/2018    ALT 34 01/08/2018    AST 26 01/08/2018      INR RESULTS:  Lab Results   Component Value Date    INR 0.99 11/06/2016       Lab Results   Component Value Date    MAG 1.7 11/26/2016     No results found for: NTBNPI  No results found for: NTBNP    EKG March 2018: NSR    9 day cardiac monitor in March 2018: She was symptomatic (dizzy, chest pain, short of breath) with both NSR and sinus tachy episodes.    Echo 2015: Normal LV size and function EF 60%, Normal RV size and function. No valvular dysfunction.    Assessment and Plan:   # Autonomic dysfunction>>>Complex and multifaceted  # Probable OH (immediate and post-exertion). Poss some POTs component  # Probable VVS at times  # Possibly also some psychogenic pseudosyncope component that is stress related, but not proven    - Recommended isometric exercise, increasing salt and fluid intake including using Pedialyte  - Unfortunately she and her  significant other felt that these therapeutic lifestyle changes would not be helpful to her since she had tried them in the past and they did not seem to help. They also wanted to her get a cardiac monitor while we felt that her cardiac workup in the past including echo and most recent cardiac monitor in March 2018 was unremarkable.  Also had increase HR with standing on exam today  - They left the clinic upset that we did not offer new ideas or deal with all her problems    Staffed with Dr. Praneeth Kim  General Cardiology Fellow, PGY5  Pager [phone]    I very much appreciated the opportunity to see and assess Samara Oropeza in the clinic with CV Fellow Dr Kim  Please do not hesitate to contact my office if you have any questions or concerns.      Cornell Sosa MD  Cardiac Arrhythmia Service  [facility]  [phone]    CC  Patient Care Team:  Sonja Abreu APRN CNP as PCP - General (Nurse Practitioner)  Jennifer Acevedo MD as MD (Orthopedics)  Lilliana Miramontes APRN CNP as Nurse Practitioner (Nurse Practitioner)  Shagufta Austin, RN as Clinic Care Coordinator (Gastroenterology)  Cristy Russell, RN as Nurse Coordinator (Neurology)  Carrie Ngo, JESSY as Nurse Coordinator (Neurology)  Austen Marquez MD as MD (Rheumatology)  Umer Heaton MD as MD (Ophthalmology)  Suzy Harman MD as MD (Family Medicine - Sports Medicine)  Esau Elliott MD as MD (Dermatology)  Frederick Bender MD as MD (Physical Medicine and Rehab)  Vidya Bryson, RN as Clinic Care Coordinator (Physical Medicine and Rehab)  Marcelle Richardson PT as Physical Therapist (Physical Medicine and Rehab)  Cata Hurst NP as Nurse Practitioner (Nurse Practitioner Psych/Mental Health)  HUSSEIN Lynn MD as MD (Cardiology)

## 2018-04-13 ENCOUNTER — OFFICE VISIT (OUTPATIENT)
Dept: SLEEP MEDICINE | Facility: CLINIC | Age: 23
End: 2018-04-13
Payer: COMMERCIAL

## 2018-04-13 DIAGNOSIS — G47.19 EXCESSIVE DAYTIME SLEEPINESS: Primary | ICD-10-CM

## 2018-04-13 PROCEDURE — 95803 ACTIGRAPHY TESTING: CPT | Performed by: INTERNAL MEDICINE

## 2018-04-13 NOTE — MR AVS SNAPSHOT
After Visit Summary   4/13/2018    Samara Oropeza    MRN: [id number]           Patient Information     Date Of Birth          1995        Visit Information        Provider Department      4/13/2018 3:00 PM BK BED 5 [facility]        Today's Diagnoses     Excessive daytime sleepiness    -  1       Follow-ups after your visit        Your next 10 appointments already scheduled     Apr 16, 2018  1:30 PM CDT   Robert Long with EVI Cardona CNP   [facility] ([facility]    [address] [place] [address]   [phone]            Apr 17, 2018 11:00 AM CDT   Return Visit with Austen Marquez MD   [facility] ([facility])    [address] [id number] [phone]            Apr 30, 2018  8:00 PM CDT   PSG Split with BK BED 1   [facility] ([facility])    [address]  [facility] [address]   [phone]            May 01, 2018  7:30 AM CDT   LAB with BK LAB   [facility] ([facility])    [address] [address]   [phone]           Please do not eat 10-12 hours before your appointment if you are coming in fasting for labs on lipids, cholesterol, or glucose (sugar). This does not apply to pregnant women. Water, hot tea and black coffee (with nothing added) are okay. Do not drink other fluids, diet soda or chew gum.            May 01, 2018  8:00 AM CDT   Actigraphy Drop Off with BK SC DME   [facility] ([facility])    [address]  [facility] [address]   [phone]            May 01, 2018 10:45 AM CDT   Return Visit with Cata Hurst NP   [facility] ([facility])    [address]  [address]  [facility] [address]   [phone]            May 15, 2018 11:00 AM CDT   New Sleep Patient with Dashawn Colunga MD   [facility] ([facility])    [address]  [facility] [address]   [phone]            May 24, 2018  2:30 PM CDT   Return Sleep Patient with Kimo Gaston PsyD   [facility] ([facility])    [address]  [facility] [address]   [phone]            May 29, 2018  3:00 PM CDT   (Arrive by 2:45 PM)   Return Botox with Frederick Bender MD   [facility] ([facility])    [address] [place] [id number] [phone]            Aug 21, 2018 11:00 AM CDT   (Arrive by 10:45 AM)   Return Botox with Frederick Bender MD   [facility] ([facility])    [address] [place] [id number] [phone]              Who to contact     If you have questions or need follow up information about today's clinic visit or your schedule please contact [facility] directly at [phone].  Normal or non-critical lab and imaging results will be communicated to you by MyChart, letter or phone within 4 business days after the clinic has received the results. If you do not hear from us within 7 days, please contact the clinic through [employer] or phone. If you have a critical or abnormal lab result, we will notify you by phone as soon as possible.  Submit refill requests through [employer] or call your pharmacy and they will forward the refill request to us. Please allow 3 business days for your refill to be completed.          Additional Information About Your Visit        [employer] Information     [employer] gives you secure access to your electronic  health record. If you see a primary care provider, you can also send messages to your care team and make appointments. If you have questions, please call your primary care clinic.  If you do not have a primary care provider, please call [phone] and they will assist you.        Care EveryWhere ID     This is your Care EveryWhere ID. This could be used by other organizations to access your [facility] medical records  [id number]         Blood Pressure from Last 3 Encounters:   04/12/18 124/81   04/05/18 116/80   03/28/18 120/74    Weight from Last 3 Encounters:   04/12/18 66.9 kg (147 lb 6.4 oz)   04/05/18 67.6 kg (149 lb)   03/28/18 67.9 kg (149 lb 11.2 oz)              Today, you had the following     No orders found for display       Primary Care Provider Office Phone # Fax #    Sonja EVI Laguerre [facility] [phone]       [address] [facility] [address]  [facility] [address]        Equal Access to Services     FRANK GALEANO : Hadii aad ku hadasho Soomaali, waaxda luqadaha, qaybta kaalmada adeegyada, waxjarred salinas hayarlet rodriguez . So [facility] [phone].    ATENCIÓN: Si habla español, tiene a livingston disposición servicios gratuitos de asistencia lingüística. [facility] [phone].    We comply with applicable federal civil rights laws and [place] laws. We do not discriminate on the basis of race, color, national origin, age, disability, sex, sexual orientation, or gender identity.            Thank you!     Thank you for choosing [facility]  for your care. Our goal is always to provide you with excellent care. Hearing back from our patients is one way we can continue to improve our services. Please take a few minutes to complete the written survey that you may receive in the mail after your visit with us. Thank you!             Your Updated Medication List - Protect others around you: Learn how to safely use, store and throw away your medicines at www.disposemymeds.org.          This  list is accurate as of 4/13/18 11:59 PM.  Always use your most recent med list.                   Brand Name Dispense Instructions for use Diagnosis    albuterol 108 (90 Base) MCG/ACT Inhaler    PROAIR HFA/PROVENTIL HFA/VENTOLIN HFA    1 Inhaler    Inhale 2 puffs into the lungs every 6 hours as needed for shortness of breath / dyspnea or wheezing    Atypical chest pain       amitriptyline 50 MG tablet    ELAVIL    30 tablet    Take 1 tablet (50 mg) by mouth At Bedtime    Abdominal pain, generalized, Insomnia due to medical condition       apremilast 30 MG tablet    OTEZLA     Take 1 tablet (30 mg) by mouth 2 times daily    Behcet's disease (H)       ASPIRIN CHILDRENS 81 MG chewable tablet   Generic drug:  aspirin      Take 81 mg by mouth as needed        aspirin-acetaminophen-caffeine 250-250-65 MG per tablet    EXCEDRIN MIGRAINE     Take 1 tablet by mouth every 6 hours as needed for headaches        benzocaine 20 % Gel    TOPICALE XTRA    30 g    Apply as needed locally to mouth or nasal ulcers for pain; 4 times daily as needed    Behcet's disease (H)       betamethasone valerate 0.1 % cream    VALISONE     Apply topically 2 times daily        * BOTOX IJ      Inject 165 Units as directed once Lot#: /C3 Exp: 10/2020        * BOTOX IJ      Inject 165 Units as directed once Lot # /C3  Exp:  10/2020        * botulinum toxin type A 100 units injection    BOTOX    200 Units    Inject 200 Units into the muscle every 3 months    Cervical dystonia       * BOTOX IJ      Inject 165 Units into the muscle once Lot /C3 Exp 06/2020        carbamide peroxide 6.5 % otic solution    DEBROX     5 drops 2 times daily        clobetasol 0.05 % cream    TEMOVATE    60 g    Apply topically 2 times daily    Folliculitis       colchicine 0.6 MG tablet     270 tablet    TAKE 2 TABLETS BY MOUTH EVERY MORNING AND 1 TABLET EVERY EVENING.    Behcet's disease (H)       COMPOUNDED NON-CONTROLLED SUBSTANCE - PHARMACY TO MIX  COMPOUNDED MEDICATION    CMPD RX    30 capsule    Take one capsule in the morning    Fibromyalgia       cyproheptadine 4 MG tablet    PERIACTIN    30 tablet    Take 1 tablet (4 mg) by mouth At Bedtime For nightmares    Nightmares       diclofenac 1 % Gel topical gel    VOLTAREN    100 g    Apply 2-4 grams to affected area(s) up to 4 times per day as needed. This is an anti-inflammatory medication.    Polyarthralgia       dicyclomine 20 MG tablet    BENTYL    120 tablet    Take 1 tablet (20 mg) by mouth 4 times daily as needed    Abdominal cramping       diltiazem 2% in PLO cream (FV COMPOUNDED) 2% Gel     30 g    Apply small pea size amount three times daily to anus until pain is gone.    Anal fissure       diphenhydrAMINE 25 MG tablet    BENADRYL    30 tablet    Take 1 tablet (25 mg) by mouth every 8 hours as needed for allergies        EPINEPHrine 0.3 MG/0.3ML injection 2-pack    EPIPEN 2-EAMON    0.6 mL    Inject 0.3 mLs (0.3 mg) into the muscle as needed for anaphylaxis    Hx of bee sting allergy       * guanFACINE 1 MG tablet    TENEX    270 tablet    Take 3 tablets (3 mg) by mouth 2 times daily    Tic       * guanFACINE 1 MG tablet    TENEX    180 tablet    Take 3 tablets (3 mg) by mouth twice daily morning and evening.    Tic       hydrOXYzine 25 MG tablet    ATARAX    90 tablet    Take 1-2 tablets (25-50 mg) by mouth every 6 hours as needed for anxiety    TAHIR (generalized anxiety disorder)       hyoscyamine 0.125 MG tablet    ANASPAZ/LEVSIN    40 tablet    Take 1-2 tablets (125-250 mcg) by mouth every 4 hours as needed for cramping    Abdominal pain, generalized       hypromellose 0.3 % Soln ophthalmic solution    GENTEAL     1 drop every hour as needed for dry eyes        LACTAID PO      Take by mouth daily        lactulose 20 GM/30ML Soln     300 mL    Take 30 mLs by mouth 3 times daily as needed    Constipation, unspecified constipation type       lidocaine 2 % topical gel    XYLOCAINE     Apply 1 Tube  topically daily Reported on 4/21/2017        lidocaine visc 2% 2.5mL/5mL & maalox/mylanta w/ simeth 2.5mL/5mL & diphenhydrAMINE 5mg/5mL Susp suspension    [facility] Mouthwash [facility]    1 Bottle    Swish and swallow 10 mLs in mouth every 6 hours as needed for mouth sores    Behcet's syndrome (H)       linaclotide 145 MCG capsule    LINZESS    90 capsule    Take 1 capsule (145 mcg) by mouth every morning (before breakfast)    Chronic idiopathic constipation       LORazepam 1 MG tablet    ATIVAN    60 tablet    Take 0.5 tablets (0.5 mg) by mouth 2 times daily as needed for other (atypical chest pain) Do not operate a vehicle after taking this medication    Chronic abdominal pain       lubiprostone 24 MCG capsule    AMITIZA    30 capsule    Take 1 capsule (24 mcg) by mouth 2 times daily (with meals)    Chronic idiopathic constipation       omeprazole 40 MG capsule    priLOSEC    90 capsule    Take 1 capsule (40 mg) by mouth daily    Gastroesophageal reflux disease, esophagitis presence not specified       ondansetron 8 MG tablet    ZOFRAN    20 tablet    Take 1 tablet (8 mg) by mouth every 8 hours as needed for nausea    Nausea       SPRINTEC 28 0.25-35 MG-MCG per tablet   Generic drug:  norgestimate-ethinyl estradiol     84 tablet    TAKE 1 TABLET BY MOUTH ONCE DAILY.    General counseling for prescription of oral contraceptives       sucralfate 1 GM/10ML suspension    CARAFATE    1200 mL    Take 10 mLs (1 g) by mouth 4 times daily    Bile reflux gastritis, Nausea       triamcinolone 0.1 % cream    KENALOG    15 g    Apply sparingly to oral ulcers three times daily for 14 days as needed.    Behcet's syndrome (H)       * Notice:  This list has 6 medication(s) that are the same as other medications prescribed for you. Read the directions carefully, and ask your doctor or other care provider to review them with you.

## 2018-04-16 ENCOUNTER — OFFICE VISIT (OUTPATIENT)
Dept: FAMILY MEDICINE | Facility: CLINIC | Age: 23
End: 2018-04-16
Payer: COMMERCIAL

## 2018-04-16 VITALS
HEART RATE: 68 BPM | OXYGEN SATURATION: 100 % | TEMPERATURE: 98.1 F | WEIGHT: 147 LBS | DIASTOLIC BLOOD PRESSURE: 74 MMHG | SYSTOLIC BLOOD PRESSURE: 122 MMHG | BODY MASS INDEX: 26.46 KG/M2

## 2018-04-16 DIAGNOSIS — M25.562 CHRONIC PAIN OF LEFT KNEE: Primary | ICD-10-CM

## 2018-04-16 DIAGNOSIS — G89.29 CHRONIC PAIN OF LEFT KNEE: Primary | ICD-10-CM

## 2018-04-16 DIAGNOSIS — R55 SYNCOPE, UNSPECIFIED SYNCOPE TYPE: ICD-10-CM

## 2018-04-16 DIAGNOSIS — Z30.09 GENERAL COUNSELING FOR PRESCRIPTION OF ORAL CONTRACEPTIVES: ICD-10-CM

## 2018-04-16 DIAGNOSIS — F43.10 PTSD (POST-TRAUMATIC STRESS DISORDER): ICD-10-CM

## 2018-04-16 PROCEDURE — 99214 OFFICE O/P EST MOD 30 MIN: CPT | Performed by: NURSE PRACTITIONER

## 2018-04-16 RX ORDER — NORGESTIMATE AND ETHINYL ESTRADIOL 0.25-0.035
1 KIT ORAL DAILY
Qty: 84 TABLET | Refills: 4 | Status: SHIPPED | OUTPATIENT
Start: 2018-04-16 | End: 2018-12-05

## 2018-04-16 RX ORDER — NORGESTIMATE AND ETHINYL ESTRADIOL 0.25-0.035
KIT ORAL
Qty: 84 TABLET | Refills: 4 | Status: SHIPPED | OUTPATIENT
Start: 2018-04-16 | End: 2018-06-01

## 2018-04-16 NOTE — PROGRESS NOTES
SUBJECTIVE:   Samara Oropeza is a 22 year old female who presents to clinic today for the following health issues:    Joint Pain    Onset: About 1 year, but much worse in past couple weeks     Had a meniscal tear that was repaired, A CT scan about 1 year ago, and was told she had a small meniscal tear again, has intermittent swelling and redness and pain    Has tried four different courses of physical therapy, all at least three months    Description:   Location: left knee  Character: Sharp and swollen and extremely painful     Intensity: moderate, severe    Progression of Symptoms: worse    Accompanying Signs & Symptoms:  Other symptoms: warmth, swelling, redness and lots of liquids forming intermittent     History:   Previous similar pain: no       Precipitating factors:   Trauma or overuse: YES- had surgery at [facility] on it but has not gotten any better     Alleviating factors:  Improved by: nothing    Therapies Tried and outcome: PT and has not helped and was told they could not help beyond it.     Questions/Concerns: would like to talk about birth control issues. Has not been able to get it filled.   Recently saw cardiology concerning syncopal spells. Was told by cardiologist on 3/3/2018 that she should have at least a 30 day monitor to assess for arrythmia, but this was not addressed by the cardiologist she saw. She had an episode after her monitoring patch was removed.  She would like to see another cardiologist because she didn't feel that the cardiologist listened to her symptoms and understood the heavenly cardiologist's recommendation for a 30 day monitor.    Problem list and histories reviewed & adjusted, as indicated.  Additional history: as documented    Patient Active Problem List   Diagnosis     Tics - Tourette syndrome     IBS (irritable bowel syndrome)     Seasonal allergic rhinitis     TAHIR (generalized anxiety disorder)     Knee pain     Concussion     Milk protein intolerance     Headaches due  to old head injury     Behcet's disease (H)     Migraine     Spell of shaking     On colchicine therapy     Palpitations     Fibromyalgia     Rheumatoid arthritis of multiple sites without rheumatoid factor (H)     Raynaud's disease without gangrene     Chronic abdominal pain     Patellofemoral instability     PTSD (post-traumatic stress disorder)     Cervical dystonia     Acute left ankle pain     Cervical pain     Major depressive disorder, recurrent episode, moderate (H)     Chronic pain syndrome     Convulsions, unspecified convulsion type (H)     Transient alteration of awareness     Vitamin D deficiency     Mobile cecum     Spells of decreased attentiveness     Past Surgical History:   Procedure Laterality Date     ARTHROSCOPY KNEE WITH PATELLAR REALIGNMENT  7/25/2013    Procedure: ARTHROSCOPY KNEE WITH PATELLAR REALIGNMENT;  Left Knee Arthroscopy, Medial Patellofemoral Ligament Reconstruction with Allograft  ;  Surgeon: Jennifer Acevedo MD;  Location: [facility]     COLONOSCOPY  2015     DENTAL SURGERY  1996    Teeth removal     ENDOSCOPY UPPER, COLONOSCOPY, COMBINED  2005     HC ESOPH/GAS REFLUX TEST W NASAL IMPED >1 HR N/A 2/15/2017    Procedure: ESOPHAGEAL IMPEDENCE FUNCTION TEST WITH 24 HOUR PH GREATER THAN 1 HOUR;  Surgeon: Timothy Matta MD;  Location:  GI       Social History   Substance Use Topics     Smoking status: Never Smoker     Smokeless tobacco: Never Used     Alcohol use 0.6 oz/week     1 Standard drinks or equivalent per week      Comment: rarely     Family History   Problem Relation Age of Onset     Depression Mother      Neurologic Disorder Mother      Migraines, take imitrex injection.  Also in maternal grandmother.       Alcohol/Drug Father      Hypertension Father      Depression Father      Cardiovascular Maternal Grandmother      Depression Maternal Grandmother      Hypertension Maternal Grandmother      Alzheimer Disease Maternal Grandmother      Cardiovascular Maternal  Grandfather      Hypertension Maternal Grandfather      Depression Maternal Grandfather      Alcohol/Drug Maternal Grandfather      Cardiovascular Paternal Grandmother      Hypertension Paternal Grandmother      Cardiovascular Paternal Grandfather      Hypertension Paternal Grandfather      Glaucoma No family hx of      Macular Degeneration No family hx of            Reviewed and updated as needed this visit by clinical staff  Tobacco  Allergies  Meds  Med Hx  Surg Hx  Fam Hx  Soc Hx      Reviewed and updated as needed this visit by Provider         ROS:  Constitutional, HEENT, cardiovascular, pulmonary, gi and gu systems are negative, except as otherwise noted.    OBJECTIVE:     /74  Pulse 68  Temp 98.1  F (36.7  C) (Oral)  Wt 147 lb (66.7 kg)  SpO2 100%  BMI 26.46 kg/m2  Body mass index is 26.46 kg/(m^2).   GENERAL: healthy, alert and no distress  NECK: no adenopathy, no asymmetry, masses, or scars and thyroid normal to palpation  RESP: lungs clear to auscultation - no rales, rhonchi or wheezes  CV: regular rate and rhythm, normal S1 S2, no S3 or S4, no murmur, click or rub, no peripheral edema and peripheral pulses strong  MS: tender to left knee    Diagnostic Test Results:  none     ASSESSMENT/PLAN:     Problem List Items Addressed This Visit     PTSD (post-traumatic stress disorder)    Relevant Orders    MENTAL HEALTH REFERRAL  - Adult; Outpatient Treatment; Individual/Couples/Family/Group Therapy/Health Psychology; [facility]: [facility] [phone]; We will contact you to schedule the appointment or please call with any questions    Knee pain - Primary    Relevant Orders    ORTHOPEDICS ADULT REFERRAL      Other Visit Diagnoses     General counseling for prescription of oral contraceptives        Relevant Medications    norgestimate-ethinyl estradiol (SPRINTEC 28) 0.25-35 MG-MCG per tablet    SPRINTEC 28 0.25-35 MG-MCG per tablet    Syncope, unspecified syncope type         Relevant Orders    CARDIOLOGY EVAL ADULT REFERRAL         Discussed that I would like her to reestablish with therapy and counseling  Renewed birth control    EVI Cardona CNP  [facility]    Please note greater than 50% of this 30 minute appointment were spent in face to face counseling with the patient of the issues described above in the history of present illness and in the plan, including providing referrals

## 2018-04-16 NOTE — PROGRESS NOTES
Pt arrived to  actigraphy.  Device was demonstrated and she appeared to understand.  She will wear until PSg on 4/30.

## 2018-04-16 NOTE — PROGRESS NOTES
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 Lilliana Miramontes- complicated migraine  # On Sprintec for birth control   # Borderline transaminase elevation    1/18 Basic blood cell counts, liver and kidney function labs within normal limits. Anemia normalized.     Meets ISG 1990 criteria for Behcets. Initially, very good response to colchicine which has reduced the intensity and frequency of the oral ulcers in the past. Patient relapsed with genital ulcers and few oral ulcers in the end of 2016 and also with folliculitis in skin. Seen Derm Dr. Elliott. He recommended otezla. Otezla is working for her and she takes twice daily 30mg PO daily. Chest pain , mouth sores, hand pain, morning pain are all better when she tried otezla 30mg twice daily. Currently able to tolerate 30mg twice daily with some nausea. The severity and frequency of oral and genital ulcers have improved on otezla already. No episodes in the last 4 weeks. When she skipped couple of days of otezla, her symptoms were worse.   April 17, 2018: Not able to keep otezla down because of nausea and vomiting.     Colchicine current dose: 1.2 mg in AM and 0.6mg in PM daily. Because of GI symptoms, we will try colchicine taper and see if that improves her GI symptoms. 0.6mg twice daily X 2 weeks and then 0.6mg daily X  2 weeks and then stop. Discussed that otezla needs to be kept down. Rx for zofran sent to be taken 30mins prior to taking otezla.     Neurology investigating for seizures and cause. Hospitalized 12/17. 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     Has tried prednisone in the past, but does not tolerate well due to mood issues, and has been off prednisone for the past 6+ months. Has H/o Tourettes. Followed by Dr. Miramontes.     She continues to have GI symptoms  Colonoscopy and endoscopy negative 2018.  Has gastroparesis.  Behcets may have GI involvement but no evidence of GI inflammation at this time. Dr. Baker has evaluated her. Dr. Rollins did the endoscopies. Her abdominal pain is not related to otezla/colchicine as her abdominal symptoms were present even before they were started. This was verified with the patient.  Patient seeing [facility] currently.     She gets visual symptoms  But there is no evidence of uveitis including posterior uveitis or retinal vasculitis, denies symptoms at today's visit. She has seen Dr. Garcia in the past and also seen Dr. Heaton around 2/16 and 9/17. Patient still getting double vision and floaters but 9/17 eye exam was stable.     She also likely has fibromyalgia which is uncontrolled. Her behcets treatment has been optimized now. The remnant pain is likely from fibromyalgia. I would request pain clinic to help with fibromyalgia management.     For chest symptoms, she was referred to pulmonology by GI. CT chest negative for any lung issues 1/18    - Continue oral gel/Triamcinolone acetonide cream (0.1 percent in Or[employer]) three to four times daily during attacks of oral ulcers and be used until pain from the ulcer ceases. Potent topical corticosteroids may be used for genital ulcers. Also use magic mouthwash as needed.  - Other comorbidities to watch for in Behcets: Vascular disease in Behçet s is more common in men. Large vessel vascular involvement occurs in  approximately one-third of patients with Behcet s disease. Pulmonary artery vasculitis can present with hemoptysis (misdiagnosis can lead to poor prognosis). Secondary fibromyalgia, CNS disease, amyloidosis, sacroiliitis.     Return in about 3 months (around 7/17/2018).    No orders of the defined types were placed in this encounter.      Medications Discontinued During This Encounter   Medication Reason     ondansetron (ZOFRAN) 8 MG tablet Reorder     colchicine 0.6 MG tablet Reorder     apremilast (OTEZLA) 30 MG tablet Reorder     Current Outpatient Prescriptions   Medication Sig Dispense Refill     ondansetron (ZOFRAN) 8 MG tablet Take 1 tablet (8 mg) by mouth every 8 hours as needed for nausea 60 tablet 1     colchicine 0.6 MG tablet 0.6mg twice daily X 2 weeks and then 0.6mg daily X 2 weeks and then stop. 45 tablet 0     apremilast (OTEZLA) 30 MG tablet Take 1 tablet (30 mg) by mouth 2 times daily 180 tablet 0     norgestimate-ethinyl estradiol (SPRINTEC 28) 0.25-35 MG-MCG per tablet Take 1 tablet by mouth daily 84 tablet 4     SPRINTEC 28 0.25-35 MG-MCG per tablet 1 by mouth daily 84 tablet 4     guanFACINE (TENEX) 1 MG tablet Take 3 tablets (3 mg) by mouth twice daily morning and evening. 180 tablet 1     diclofenac (VOLTAREN) 1 % GEL topical gel Apply 2-4 grams to affected area(s) up to 4 times per day as needed. This is an anti-inflammatory medication. 100 g 4     hyoscyamine (ANASPAZ/LEVSIN) 0.125 MG tablet Take 1-2 tablets (125-250 mcg) by mouth every 4 hours as needed for cramping 40 tablet 1     OnabotulinumtoxinA (BOTOX IJ) Inject 165 Units as directed once Lot#: /C3  Exp: 10/2020       OnabotulinumtoxinA (BOTOX IJ) Inject 165 Units as directed once Lot # /C3   Exp:  10/2020       hydrOXYzine (ATARAX) 25 MG tablet Take 1-2 tablets (25-50 mg) by mouth every 6 hours as needed for anxiety 90 tablet 2     cyproheptadine (PERIACTIN) 4 MG tablet Take 1 tablet (4 mg) by mouth At Bedtime For  nightmares 30 tablet 2     lubiprostone (AMITIZA) 24 MCG capsule Take 1 capsule (24 mcg) by mouth 2 times daily (with meals) 30 capsule 1     benzocaine (TOPICALE XTRA) 20 % GEL Apply as needed locally to mouth or nasal ulcers for pain; 4 times daily as needed 30 g 1     linaclotide (LINZESS) 145 MCG capsule Take 1 capsule (145 mcg) by mouth every morning (before breakfast) 90 capsule 1     dicyclomine (BENTYL) 20 MG tablet Take 1 tablet (20 mg) by mouth 4 times daily as needed 120 tablet 3     aspirin-acetaminophen-caffeine (EXCEDRIN MIGRAINE) 250-250-65 MG per tablet Take 1 tablet by mouth every 6 hours as needed for headaches       OnabotulinumtoxinA (BOTOX IJ) Inject 165 Units into the muscle once Lot /C3  Exp 06/2020       guanFACINE (TENEX) 1 MG tablet Take 3 tablets (3 mg) by mouth 2 times daily 270 tablet 3     diltiazem 2% in PLO cream, FV COMPOUNDED, 2% GEL Apply small pea size amount three times daily to anus until pain is gone. 30 g 1     amitriptyline (ELAVIL) 50 MG tablet Take 1 tablet (50 mg) by mouth At Bedtime 30 tablet 11     lactulose 20 GM/30ML SOLN Take 30 mLs by mouth 3 times daily as needed 300 mL 3     LORazepam (ATIVAN) 1 MG tablet Take 0.5 tablets (0.5 mg) by mouth 2 times daily as needed for other (atypical chest pain) Do not operate a vehicle after taking this medication 60 tablet 0     diphenhydrAMINE (BENADRYL) 25 MG tablet Take 1 tablet (25 mg) by mouth every 8 hours as needed for allergies 30 tablet 0     sucralfate (CARAFATE) 1 GM/10ML suspension Take 10 mLs (1 g) by mouth 4 times daily 1200 mL 2     aspirin (ASPIRIN CHILDRENS) 81 MG chewable tablet Take 81 mg by mouth as needed        omeprazole (PRILOSEC) 40 MG capsule Take 1 capsule (40 mg) by mouth daily 90 capsule 3     EPINEPHrine (EPIPEN 2-EAMON) 0.3 MG/0.3ML injection Inject 0.3 mLs (0.3 mg) into the muscle as needed for anaphylaxis 0.6 mL 3     albuterol (PROAIR HFA/PROVENTIL HFA/VENTOLIN HFA) 108 (90 BASE) MCG/ACT  Inhaler Inhale 2 puffs into the lungs every 6 hours as needed for shortness of breath / dyspnea or wheezing 1 Inhaler 1     Magic Mouthwash (FV std formula) lidocaine visc 2% 2.5mL/5mL & maalox/mylanta w/ simeth 2.5mL/5mL & diphenhydrAMINE 5mg/5mL Swish and swallow 10 mLs in mouth every 6 hours as needed for mouth sores 1 Bottle 1     clobetasol (TEMOVATE) 0.05 % cream Apply topically 2 times daily 60 g 0     botulinum toxin type A (BOTOX) 100 UNITS injection Inject 200 Units into the muscle every 3 months 200 Units 3     hypromellose (GENTEAL) 0.3 % SOLN 1 drop every hour as needed for dry eyes        betamethasone valerate (VALISONE) 0.1 % cream Apply topically 2 times daily       carbamide peroxide (DEBROX) 6.5 % otic solution 5 drops 2 times daily       Lactase (LACTAID PO) Take by mouth daily       lidocaine (XYLOCAINE) 2 % jelly Apply 1 Tube topically daily Reported on 4/21/2017       triamcinolone (KENALOG) 0.1 % cream Apply sparingly to oral ulcers three times daily for 14 days as needed. 15 g 1     COMPOUNDED NON-CONTROLLED SUBSTANCE (CMPD RX) - PHARMACY TO MIX COMPOUNDED MEDICATION Take one capsule in the morning 30 capsule 0     Austen Marquez MD.           Active Problem List:     Patient Active Problem List    Diagnosis Date Noted     Spells of decreased attentiveness 12/19/2017     Priority: Medium     Mobile cecum 11/09/2017     Priority: Medium     Cecum noted in Right lower quadrant on 4/17 CT scan, and in Left upper Quadrant on CT on 11/2017.       Vitamin D deficiency 10/11/2017     Priority: Medium     How low, unknown/not found. On D when tested at 28. Starting cholecalciferol October 2017. Needs recheck.       Convulsions, unspecified convulsion type (H) 10/03/2017     Priority: Medium     Transient alteration of awareness 10/03/2017     Priority: Medium     Chronic pain syndrome 07/27/2017     Priority: Medium     Major depressive disorder, recurrent episode, moderate (H) 06/27/2017      Priority: Medium     Cervical pain 05/02/2017     Priority: Medium     Acute left ankle pain 03/31/2017     Priority: Medium     Cervical dystonia 03/28/2017     Priority: Medium     PTSD (post-traumatic stress disorder) 01/17/2017     Priority: Medium     Patellofemoral instability 10/20/2016     Priority: Medium     Fibromyalgia 08/04/2016     Priority: Medium     Rheumatoid arthritis of multiple sites without rheumatoid factor (H) 08/04/2016     Priority: Medium     Raynaud's disease without gangrene 08/04/2016     Priority: Medium     Chronic abdominal pain 08/04/2016     Priority: Medium     Palpitations 01/12/2016     Priority: Medium     On colchicine therapy 10/30/2015     Priority: Medium     Spell of shaking 05/06/2015     Priority: Medium     Migraine 02/04/2015     Priority: Medium     Problem list name updated by automated process. Provider to review       Behcet's disease (H) 12/10/2014     Priority: Medium     Headaches due to old head injury 11/12/2013     Priority: Medium     Milk protein intolerance 10/11/2013     Priority: Medium     Concussion 02/13/2013     Priority: Medium     Jan 2013, with prolonged recovery- followed by sports med         Knee pain 01/03/2013     Priority: Medium     TAHIR (generalized anxiety disorder) 06/25/2009     Priority: Medium     Tics - Tourette syndrome 05/18/2009     Priority: Medium     Followed by psychotherapy. Symptoms well managed. Originally diagnosed at [facility]. (Dr. Simpson)           IBS (irritable bowel syndrome) 05/18/2009     Priority: Medium     Seasonal allergic rhinitis 05/18/2009     Priority: Medium          History of Present Illness:     Chief Complaint   Patient presents with     RECHECK     Seen Dr. Marilyn [facility] in [facility] 10/14:    Original presentation 2014:    Ms Oropeza is a 19 y.o. female who presents with one year of presentation of painful oral ulcers (monthly) once that have worsened with two episodes in the last two  months that presented with painful vulvar or vaginal ulcers (2 episodes so far every month) [not sure if they leave scar] as well that timing coincides with menses ([facility]: 8/25/14).   ORAL ULCERS: last two episodes were severe, painful, white base with erythematous halo, that appear and disappear in the matter of 1-2 weeks without, does not bleed and doesn't respond to any treatment used (magic mouthwash), 10-15 in number with the biggest one being dime size.     VAGINAL ULCERS: 2-3 in number between labia majora and minora that occur simultaneously with oral ulcers, painful, non bleeding ulcers that are erythematous, no pus.     FOLLICULITIS: patient reports having spots in legs specially around ankle and knee, but arms, and neck are affected as well. Small lesions that resemble ingrown hair, most are red erythematous elevated lesions, well demarcated, some with liquid that patient refers as pimple like.     SKIN RASHES: welts and red macules with well defined borders that are pruritic and non-ulcerative on chest, arms and back. Benadryl relieves.     ARTHRALGIAS: In descending order of severity: hips, knees, wrists, shoulders, neck, lumbar, fingers.   C/o morning stiffness in hips, back and neck lasting for about until noon.     Ms. Oropeza reports they present in severe flares and never fully resolve, but do get better. She has seen some swelling and warmth on the affected joints but has not noticed and redness. Has tried Tylenol, ibuprofen, and hydrocodone with not much benefit.     FEVERS: Reports 3-4 sporadic episodes per month of self limited fevers of 38 C that occur during the evenings and associate facial flushing.     HEADACHES: occur daily and are bitemporal and irradiate occipitally, pulsatile in nature, intensity varies from intense to mild, are worsened with movement. On treatment with ibuprofen and somatriptan without any positive results.     VISION CHANGES: Patient reports that suddenly she would only  see a gray blotch in her right eyes and would persist like this for a day and a half. Also reports blurriness in her left eye. Presence of pain and burning sensation of eyeball with associated redness. Has changed prescription glasses in the matter of 2 years 3 times. She has had opthalmology exam and was not suggestive of any evidence of uveitis.   She was also evaluated in ED for a dizzy spell.     ABD PAIN W/ INTERMITTENT DIARRHEA AND CONSTIPATION: Patient reports abdominal pain that is intermittent, periods of 7 days without bowel movement and feeling bloated with change of pattern to diarrhea (liquidy without blood nor mucus, non foul smelling). Has taken Bentyl, Zegrid, and rinetidine with mild clinical improvement.  She also reports small red nodules after each needle stick for blood draw.   Neurology saw her back then and was not impressed with her presentation as physical examination was normal. Also MRI brain normal: Findings: No definite hemorrhage, mass affect, midline shift, or ventriculomegaly is noted.There are a few scattered non specific white matter T2 hyperintensities. Axial diffusion weighted images are unremarkable.     The major vascular structures appear patent. There is opacification and severe mucosal thickening in the right maxillary sinus. The remaining paranasal sinuses, and mastoid air cells are clear. Orbits are unremarkable  She has + HSV-1 IGG. Seen ID. Negative for Herpes simplex virus culture. HSV IGM I/II COMBINATION SENT TO [facility]  RESULTS = <0.91  REF RANGE: <0.91 = NEGATIVE  ID did not think HSV could explain her mouth and genital sores.     CRP within normal limits. HIV negative. CBC within normal limits; UA negative. Creatinine within normal limits   CYNDIE neg.  Antigliadin neg.   ANti TTG neg.   [facility] 2014: Colonoscopy and endoscopy neg; result not available. Not sure if biopsies were done.   Raynaud's phenomenon:  Onset: about 2013  Digits: all fingers  Digital ulcers:  no  Color changes: white ---> purple and red  Duration of an attack: About couple of hours per mom  Pain during attack: not clear pain but bruise like feeling  Frequency of attacks: few times a month  Family history of Raynauds: no  GERD: very long time    No hemoptysis.   No miscarriages/ no thrombosis in past.   No family or personal history of psoriasis, ulcerative colitis or chron's disease.   No buttock pain or low back pain or stiffness in AM    Interim history:  Dec 2014- Multiple mouth ulcers and did not eat for 5 days. This coincided with periods. Colchicine 0.6mg PO BID started in Nov 2014.   Prednisone taper in Dec 20mg to 0 in 4 weeks. She also used magic mouthwash. Kenalog cream. After going to the ER, 3 days later her ulcers were better. She thinks it improved after the menstruation stopped.   LMP 3 weeks ago.   COLCHICINE HAS HELPED A LOT REDUCING THE INTENSITY OF MENSTRUATION ASSOCIATED ORAL AND VULVAR ULCERS.     Interim history: 6/15    Since last visit only two episodes of mouth sores- small sized 6   No genital ulcers since last visit.   Colchicine 1.2 mg in AM and 0.6mg in PM keeps her symptoms under control.     Admitted in 5/15:  Admission Diagnoses:  - LUE weakness  - spell  - hx of migraines  - hx of Tourette syndrome  - hx of Behcet's disease    Discharge Diagnoses:   - spell likely 2/2 anxiety  - hx of migraines  - hx of Tourette syndrome  - hx of Behcet's disease    CT and MRI brain was normal.     Seeing Dr. Lilliana Miramontes soon as outpatient.     Dr. Garcia did not find any intraocular inflammation.      Interm 10/30/2015  ED for migraine. Continues to see neuro - MRI brain without lesions noted. Saw GI - upper barium normal. May be considering repeat colo. Worsening lesions in mouth and genitals. Has had continuous lesion in mouth x 2 months. 2 genital ulcers. Had fracture of right sesamoid in foot. Continues to have low grade fevers. New folliculitis on chest, legs and arms.  "    Interim hx: 1/11/2016    Pt states that since last visit she continues to have oral ulcers and has noted more folliculitis-like lesions on her lower extremities.  She states that on her right lower leg she had a red macular spot that has since resolved.  She denies uveitis.  She states that the colchicine initially helped but then stopped working.  She takes 0.6 mg TID.  She stopped taking her prednisone last week as she feels like this hasn't helped.  She hasn't had any episodes of vaginal ulcers.      She saw GI who stated she has gastroparesis.  She is seeing Cardiology later this week for possible syncopal episodes.      Interim history 5/16  Mouth ulcers X 1 last month  Genital ulcers - none since last visit.     Bilateral MCPs pain, knee pain and low back pain +ve increased since last visit  Morning stiffness X 2 hours (increasing)   5-6/10    \"overall myalgia\" has gotten worse    C/o pseudofolliculitis lesion on anterior shins bilaterally worse    Interim history: (7/18/2016)  Patient has just started Humira for 2 doses on 7/1 and 7/15 and reported minimal improvement of her hip pain but otherwise, did not notice anything different.     She reported painful mouth ulcer once a month since last visit but noticed that it has been getting deeper.   Denied any genital ulcers.    Still has ongoing bilateral MCPs pain, knee pain but this has been intermittent and she did not have any much pain today. She reported 2-3 hours morning stiffness of both hands and pain score of 6/10 at her knees and 8/10 of her hands but currently takes no pain medication except prn ativan which she takes when her muscle is really tight.     The only concern is that she gained weight even though she has not been eating much (eat once a day) and do exercises every day for 1 hour (with video of yoga, pilates). Her mother wonders if she needs to have some labs work up include sex hormone, thyroid, cortisol.    Interval history " 9/14/16  Patient was started Humira at the last visit, patient reports worsening of skin ulcers since starting Humira and stopped taking Humura for the past 2 weeks. Patient reports oral and genital ulcers has been stable even before starting Humira and has not had any interval oral/genital ulcers. However she reports recurrent skin ulcers occurring on a daily basis that affects her chest and anterior legs. Patient also has stopped taking prednisone, she reports that she doesn't feel the prednisone helps, her last dose of prednisone was 6+ months ago. Patient also report worsening low back pain that sometimes causes her to limp.    In the interval patient also visited ED on 8/31/16 for left hand pain and what the patient describes as phlebitis, no medication or procedure was done and the patient was discharged with a splint. Patient also reported 1 episode of chest pressure that was associated with dyspnea and numbness of the left arm, she was shopping in a [employer] at the time of onset, and the pressure resolved after she took a nap.    Patient denies any infectious symptoms in the interval, no fever, chills, night sweat, cough, dysuria, denies eye pain or visual changes.    November 4, 2016  Oct - had one genital ulcer  Oral ulcers X 5- around menstruation time.   Knee pain- chronic on the left side  Dizziness spells - on and off; been to the ER for that in the past.   On and off migraines  July 2013 - s/p MPFL reconstruction plus LRL 7/2013  Morning stiffness in knee (left) X 1 hour    Severe jaw pain and chest pain- patient wondering if this is Tourette's or esophageal spasms. Cardiac workup negative.   Fever     January 13, 2017  Yesterday in ER [facility] with orthostatic syncope from dehydration.   Chest pain on and off still continues. Painful with deep breaths.   Oral ulcers - few minor ones lasting for one week;   One major one in roof of mouth lasting for about one week.   Knee pain +ve - reviewed the recent  MRI with her orthopedic surgeon.   On and off migraines  otezla is approved. Still waiting to receive the meds.     March 31, 2017  Otezla current dose 15mg PO BID.   She is tolerating okay at this dose and is willing to increase the dose further up to 30mg BID.   Her joint pains are better on otezla. Knee pain is also better.   Back and neck pain +ve 8/10 when it is worse. Intramuscular botox injections were given on Monday in PMR.   2 minor genital ulcers in the interim- resolved.   Few oral ulcers in the interim- resolved.   Nasal ulcer - resolved.   Migraines on and off.   Nausea with otezla but improving.   Dizziness and blackouts on and off. She fell once and hurt her ankle. Wearing boot in left leg.   Complete ROS negative except for above    May 17, 2017  Tolerating otezla better. Not throwing up anymore. Current dose 20mg in AM and 30mg in PM (2nd week).   Patient thinks that her oral ulcers frequency and severity are improving with otezla. Also no genital ulcers in the interim.   Currently on doxycycline for bronchitis/?pneunomia. 4 more days left.     Joint pain history  2 weeks ago/ dx with pneumonia 1 week ago/  Involved joints: all over  Pain scale: 6.5/10   Wakes the patient from sleep : Yes  Morning stiffness: Yes for 120 minutes  Meds used:otezla,colchicine     Interim history  Since last visit:  1. Infections - Yes/ pneumonia  2. New symptoms/medical problem - Yes/ thinks she may have had a mini-seizure about 10 days ago ; did not seek medical attention; did not reoccur after that. Patient seeing PCP today.  3. Any side effects from Rheum medications -vomiting a lot (resolved)  3. ER visits/Hospitalizations/surgeries - Yes  4. Last PCP visit: has an apt. today    Patient still getting double vision. Floaters present.     Therapist recommended psychiatry evaluation. Patient does not want to see psychaitry. Patient will see PCP today.     August 22, 2017  Have you ever seen a rheumatologist Yes Who  You When 5/17/17    NO genital ulcers in the interim.   Mouth ulcers- three in the back of the throat and roof of the mouth last month.     Joint pain history  Onset: doing alittle worse / cut her otezla back to 30mg  Daily due to GI problems  Involved joints: all over her body. Been having more black out episodes, been having more chest pains.also has raised bumps on both legs from the knees down that itch and are painful   Pain scale:  5.5/10     Wakes the patient from sleep : Yes  Morning stiffness:Yes for 120 minutes  Meds used:otezla, colchicine (three pills daily)     Interim history  Since last visit:  1. Infections - Yes stomach issue  2. New symptoms/medical problem - No  3. Any side effects from Rheum medications -nausea from otezla  3. ER visits/Hospitalizations/surgeries - Yes, ER for foot, ankle injury from passing out and fell down the steps. Hit her head another time from passing out. Alcohol poisoning in July 4. Last PCP visit: 6/23/17 September 19, 2017  Have you ever seen a rheumatologist Yes Who You When 8/22/17  Joint pain history  Onset: pt states that she hurts everywhere for 6 days  Involved joints: all joints  Pain scale:  7/10     Wakes the patient from sleep : Yes/ not sleeping at all  Morning stiffness:Yes for 180 minutes  Meds used:colchicine, otezla, magic mouth wash, lidocaine gel  Last one week: increased joint pain; and genital ulcer  Genital lesion (dimed size) in the last one week  After botox a week ago, she had fever 101 for three days; near syncopes. Back pain; floaters  Chest pain , mouth sores, hand pain, morning pain were all better with otezla 30mg twice daily.    Interim history  Since last visit:  1. Infections - No  2. New symptoms/medical problem - No  3. Any side effects from Rheum medications -nausea from the otezla  3. ER visits/Hospitalizations/surgeries - No  4. Last PCP visit: may 2017     October 18, 2017     Have you ever seen a rheumatologist Yes Who You When  9/19/17  Joint pain history  NO mouth or genital sores in the last 4 weeks.   Medrol dosepak helped with visual symptoms but not joint pains.     Onset: pt states that she is doing better than last time with her behcet's. Today has severe stomach pains, states that she is constipated. Pt had a seizure 2 days ago. Does have a metallic taste in her mouth  Involved joints: hands , neck, shoulders  Pain scale:  9/10 from stomach pain;      Wakes the patient from sleep : Yes  Morning stiffness:Yes for 120 minutes  Meds used:cochicine , otezla 30mg twice daily     Interim history  Since last visit:  1. Infections - No  2. New symptoms/medical problem - Yes/ the cartilage in her chest is inflamed  3. Any side effects from Rheum medications -nausea from the otezla  3. ER visits/Hospitalizations/surgeries - No  4. Last PCP visit: yesterday    January 16, 2018  Have you ever seen a rheumatologist Yes Who You When 10/18/17  Joint pain history  Onset: pt states that she was in the hospital for 5 days before maribell, they told her she had lesions in her brain in the white matter. Had blood work drawn in the ER and they told her her inflammatory markers were elevated. Was seen in the ER last week for vomiting and diarrhea, not eating. Saw Gastro. On 1/10/18. 1.5 weeks ago she had an endoscopy and colonoscopy. She has been having elbow  And hands and knee pain, loosing use of her hands. Been dropping things. Also states that she has been getting lesions up her nose  Involved joints: see above  Pain scale:  8/10     Wakes the patient from sleep : Yes  Morning stiffness:Yes for 120 minutes  Meds used:colchisine, otezla, magic mouth wash, kenolog cream, lidocaine gel     Interim history  Since last visit:  1. Infections - Yes/ had an infection in her jaw. Having sinus issues  2. New symptoms/medical problem - Yes/ states that she is having problems walking, states that she was bouncing when she was walking  3. Any side effects from  Rheum medications -otezla, nausea  3. ER visits/Hospitalizations/surgeries - Yes/ see chart  4. Last PCP visit: November 2017 April 17, 2018  Have you ever seen a rheumatologist Yes Who You When 1/16/18  Joint pain history  Onset: pt states that she is not doing well. She is having increased stomach issues, only eats 2 times in 4 days unable to keep the otezla down due to vomiting . Has sleep study done in 1 week; no genital ulcers since last appointment. 6 small mouth sores since last appointment.   Involved joints: see above   Pain scale:  7/10     Wakes the patient from sleep : Yes  Morning stiffness:Yes for 60 minutes  Meds used:otezla , colchicine, diclofenac gel, magic mouthwash, lidocaine gel      Interim history  Since last visit:  1. Infections - Yes/ had a sinus infection, thinks she is getting sick now  2. New symptoms/medical problem - Yes/ neurology sent pt to cardiology. Has a heart condition but not sure what . She is seeing a ortho. Due to knee pain   3. Any side effects from Rheum medications -nausea/vomiting from the otezla   3. ER visits/Hospitalizations/surgeries - Yes/ seen in ER   4. Last PCP visit: yesterday         Past Medical History:     Past Medical History:   Diagnosis Date     Anxiety      Arthritis      Behcet's disease (H)      Cervical adenitis May 2010     Chronic abdominal pain      Constipation, chronic 1994     Gastro-oesophageal reflux disease      Gastroparesis      Migraines      Neuromuscular disorder (H)     fibramyalgia     Palpitations      Seizure (H)      Seizures (H)     unknown etiology     Syncope      Tourette's      Past Surgical History:   Procedure Laterality Date     ARTHROSCOPY KNEE WITH PATELLAR REALIGNMENT  7/25/2013    Procedure: ARTHROSCOPY KNEE WITH PATELLAR REALIGNMENT;  Left Knee Arthroscopy, Medial Patellofemoral Ligament Reconstruction with Allograft  ;  Surgeon: Jennifer Acevedo MD;  Location: [facility]     COLONOSCOPY  2015     DENTAL SURGERY  1996     Teeth removal     ENDOSCOPY UPPER, COLONOSCOPY, COMBINED  2005      ESOPH/GAS REFLUX TEST W NASAL IMPED >1 HR N/A 2/15/2017    Procedure: ESOPHAGEAL IMPEDENCE FUNCTION TEST WITH 24 HOUR PH GREATER THAN 1 HOUR;  Surgeon: Timothy Matta MD;  Location:  GI          Social History:     Social History     Occupational History     Not on file.     Social History Main Topics     Smoking status: Never Smoker     Smokeless tobacco: Never Used     Alcohol use 0.6 oz/week     1 Standard drinks or equivalent per week      Comment: rarely     Drug use: No     Sexual activity: Yes     Partners: Male     Birth control/ protection: Pill          Family History:     Family History   Problem Relation Age of Onset     Depression Mother      Neurologic Disorder Mother      Migraines, take imitrex injection.  Also in maternal grandmother.       Alcohol/Drug Father      Hypertension Father      Depression Father      Cardiovascular Maternal Grandmother      Depression Maternal Grandmother      Hypertension Maternal Grandmother      Alzheimer Disease Maternal Grandmother      Cardiovascular Maternal Grandfather      Hypertension Maternal Grandfather      Depression Maternal Grandfather      Alcohol/Drug Maternal Grandfather      Cardiovascular Paternal Grandmother      Hypertension Paternal Grandmother      Cardiovascular Paternal Grandfather      Hypertension Paternal Grandfather      Glaucoma No family hx of      Macular Degeneration No family hx of           Allergies:     Allergies   Allergen Reactions     Amoxil [Penicillins] Rash     Dad unsure of reaction.     Bee Venom Anaphylaxis     Contrast Dye Rash     Contrast Media Ready-Box [facility] [place], 04/09/2014.; Contrast [employer]-[facility] [place], 04/09/2014.  NOTE: this is a contrast media oral with iodine. Premedicate with methylpred standard for IV contrast, request barium contrast for oral contrast.     Kiwi Swelling     Orange Fruit [Citrus] Anaphylaxis     Pineapple Anaphylaxis,  Difficulty breathing and Rash     Reglan [Metoclopramide] Other (See Comments)     IV dose only, in ER, rapid heart rate.     Ace Inhibitors      Difficulty in breathing and GI upset     Amitiza [Lubiprostone] Nausea and Vomiting     Amoxicillin-Pot Clavulanate      Latex      Midazolam Unknown     Other reaction(s): Unknown  parent states that when pt takes this medication, she wakes up being very violent .  parent states that when pt takes this medication, she wakes up being very violent .     No Clinical Screening - See Comments      Bleech/ chest tightness, itchy throat, swollen tongue, hives     Tomatoes [Tomato]      Versed      Coming out of pelvic exam at age of 5, was kicking and screaming when coming out of the versed.     Adhesive Tape Rash     Azithromycin Hives and Rash     Cephalexin Itching and Rash     Itchy mouth  Itchy mouth     Keflex [Cephalexin-Fd&C Yellow #6] Hives          Medications:     Current Outpatient Prescriptions   Medication Sig Dispense Refill     ondansetron (ZOFRAN) 8 MG tablet Take 1 tablet (8 mg) by mouth every 8 hours as needed for nausea 60 tablet 1     colchicine 0.6 MG tablet 0.6mg twice daily X 2 weeks and then 0.6mg daily X 2 weeks and then stop. 45 tablet 0     apremilast (OTEZLA) 30 MG tablet Take 1 tablet (30 mg) by mouth 2 times daily 180 tablet 0     norgestimate-ethinyl estradiol (SPRINTEC 28) 0.25-35 MG-MCG per tablet Take 1 tablet by mouth daily 84 tablet 4     SPRINTEC 28 0.25-35 MG-MCG per tablet 1 by mouth daily 84 tablet 4     guanFACINE (TENEX) 1 MG tablet Take 3 tablets (3 mg) by mouth twice daily morning and evening. 180 tablet 1     diclofenac (VOLTAREN) 1 % GEL topical gel Apply 2-4 grams to affected area(s) up to 4 times per day as needed. This is an anti-inflammatory medication. 100 g 4     hyoscyamine (ANASPAZ/LEVSIN) 0.125 MG tablet Take 1-2 tablets (125-250 mcg) by mouth every 4 hours as needed for cramping 40 tablet 1     OnabotulinumtoxinA (BOTOX  IJ) Inject 165 Units as directed once Lot#: /C3  Exp: 10/2020       OnabotulinumtoxinA (BOTOX IJ) Inject 165 Units as directed once Lot # /C3   Exp:  10/2020       hydrOXYzine (ATARAX) 25 MG tablet Take 1-2 tablets (25-50 mg) by mouth every 6 hours as needed for anxiety 90 tablet 2     cyproheptadine (PERIACTIN) 4 MG tablet Take 1 tablet (4 mg) by mouth At Bedtime For nightmares 30 tablet 2     lubiprostone (AMITIZA) 24 MCG capsule Take 1 capsule (24 mcg) by mouth 2 times daily (with meals) 30 capsule 1     benzocaine (TOPICALE XTRA) 20 % GEL Apply as needed locally to mouth or nasal ulcers for pain; 4 times daily as needed 30 g 1     linaclotide (LINZESS) 145 MCG capsule Take 1 capsule (145 mcg) by mouth every morning (before breakfast) 90 capsule 1     dicyclomine (BENTYL) 20 MG tablet Take 1 tablet (20 mg) by mouth 4 times daily as needed 120 tablet 3     aspirin-acetaminophen-caffeine (EXCEDRIN MIGRAINE) 250-250-65 MG per tablet Take 1 tablet by mouth every 6 hours as needed for headaches       OnabotulinumtoxinA (BOTOX IJ) Inject 165 Units into the muscle once Lot /C3  Exp 06/2020       guanFACINE (TENEX) 1 MG tablet Take 3 tablets (3 mg) by mouth 2 times daily 270 tablet 3     diltiazem 2% in PLO cream, FV COMPOUNDED, 2% GEL Apply small pea size amount three times daily to anus until pain is gone. 30 g 1     amitriptyline (ELAVIL) 50 MG tablet Take 1 tablet (50 mg) by mouth At Bedtime 30 tablet 11     lactulose 20 GM/30ML SOLN Take 30 mLs by mouth 3 times daily as needed 300 mL 3     LORazepam (ATIVAN) 1 MG tablet Take 0.5 tablets (0.5 mg) by mouth 2 times daily as needed for other (atypical chest pain) Do not operate a vehicle after taking this medication 60 tablet 0     diphenhydrAMINE (BENADRYL) 25 MG tablet Take 1 tablet (25 mg) by mouth every 8 hours as needed for allergies 30 tablet 0     sucralfate (CARAFATE) 1 GM/10ML suspension Take 10 mLs (1 g) by mouth 4 times daily 1200 mL 2      "aspirin (ASPIRIN CHILDRENS) 81 MG chewable tablet Take 81 mg by mouth as needed        omeprazole (PRILOSEC) 40 MG capsule Take 1 capsule (40 mg) by mouth daily 90 capsule 3     EPINEPHrine (EPIPEN 2-EAMON) 0.3 MG/0.3ML injection Inject 0.3 mLs (0.3 mg) into the muscle as needed for anaphylaxis 0.6 mL 3     albuterol (PROAIR HFA/PROVENTIL HFA/VENTOLIN HFA) 108 (90 BASE) MCG/ACT Inhaler Inhale 2 puffs into the lungs every 6 hours as needed for shortness of breath / dyspnea or wheezing 1 Inhaler 1     Magic Mouthwash (FV std formula) lidocaine visc 2% 2.5mL/5mL & maalox/mylanta w/ simeth 2.5mL/5mL & diphenhydrAMINE 5mg/5mL Swish and swallow 10 mLs in mouth every 6 hours as needed for mouth sores 1 Bottle 1     clobetasol (TEMOVATE) 0.05 % cream Apply topically 2 times daily 60 g 0     botulinum toxin type A (BOTOX) 100 UNITS injection Inject 200 Units into the muscle every 3 months 200 Units 3     hypromellose (GENTEAL) 0.3 % SOLN 1 drop every hour as needed for dry eyes        betamethasone valerate (VALISONE) 0.1 % cream Apply topically 2 times daily       carbamide peroxide (DEBROX) 6.5 % otic solution 5 drops 2 times daily       Lactase (LACTAID PO) Take by mouth daily       lidocaine (XYLOCAINE) 2 % jelly Apply 1 Tube topically daily Reported on 4/21/2017       triamcinolone (KENALOG) 0.1 % cream Apply sparingly to oral ulcers three times daily for 14 days as needed. 15 g 1     COMPOUNDED NON-CONTROLLED SUBSTANCE (CMPD RX) - PHARMACY TO MIX COMPOUNDED MEDICATION Take one capsule in the morning 30 capsule 0          Physical Exam:   Blood pressure 104/70, pulse 101, temperature 97.7  F (36.5  C), temperature source Oral, height 1.575 m (5' 2\"), weight 66.7 kg (147 lb), SpO2 99 %, not currently breastfeeding.  Wt Readings from Last 4 Encounters:   04/17/18 66.7 kg (147 lb)   04/16/18 66.7 kg (147 lb)   04/12/18 66.9 kg (147 lb 6.4 oz)   04/05/18 67.6 kg (149 lb)     Constitutional: well-developed, appearing stated " age; cooperative  Mouth: no oral ulcers  MS: No synovitis in hands and wrists.  Skin: no nail pitting, alopecia, rash  Psych: nl judgement, orientation, memory, affect.         Data:     CBC RESULTS:   Recent Labs   Lab Test  06/06/17 2048   WBC  4.7   RBC  4.09   HGB  12.0   HCT  35.9   MCV  88   MCH  29.3   MCHC  33.4   RDW  13.1   PLT  189       Liver Function Studies -   Recent Labs   Lab Test  06/06/17 2048   PROTTOTAL  6.7*   ALBUMIN  3.8   BILITOTAL  0.3   ALKPHOS  91   AST  26   ALT  44       Creatinine   Date Value Ref Range Status   01/08/2018 0.72 0.52 - 1.04 mg/dL Final   ]    No results found for: URIC]    HLA-B27  No results for input(s): [id number], B1 in the last 15789 hours.  RF/CCP  Recent Labs   Lab Test  05/06/16   1554   CCPIGG  1   RHF  <20     CYNDIE/RNP/Sm/SSA/SSB  Recent Labs   Lab Test  11/01/16   1318   TREPAB  Negative     ESR/CRP  Recent Labs   Lab Test  04/21/17   1249  04/14/17   1351  11/06/16   1341  03/11/16   1350  11/18/15   1453   SED   --    --   4  5  6   CRP  <2.9  <2.9  <2.9  <2.9  <2.9       h

## 2018-04-16 NOTE — MR AVS SNAPSHOT
After Visit Summary   4/16/2018    Samara Oropeza    MRN: [id number]           Patient Information     Date Of Birth          1995        Visit Information        Provider Department      4/16/2018 1:30 PM Sonja Abreu APRN [facility]        Today's Diagnoses     Chronic pain of left knee    -  1    General counseling for prescription of oral contraceptives        Syncope, unspecified syncope type        PTSD (post-traumatic stress disorder)           Follow-ups after your visit        Additional Services     CARDIOLOGY EVAL ADULT REFERRAL       Preferred location:  [facility] - [phone]  [email or web address]    Please be aware that coverage of these services is subject to the terms and limitations of your health insurance plan.  Call member services at your health plan with any benefit or coverage questions.      Please bring the following to your appointment:  Any x-rays, CTs or MRIs which have been performed. Contact the facility where they were done to arrange for  prior to your scheduled appointment.    List of current medications  This referral request   Any documents/labs given to you for this referral            MENTAL HEALTH REFERRAL  - Adult; Outpatient Treatment; Individual/Couples/Family/Group Therapy/Health Psychology; [facility] [place]: [facility] [phone]; We will contact you to schedule the appointment or please call with any questions       All scheduling is subject to the client's specific insurance plan & benefits, provider/location availability, and provider clinical specialities.  Please arrive 15 minutes early for your first appointment and bring your completed paperwork.    Please be aware that coverage of these services is subject to the terms and limitations of your health insurance plan.  Call member services at your health plan with any benefit or coverage  questions.                      ORTHOPEDICS ADULT REFERRAL       Your provider has referred you to: [facility]: [facility] - [facility]  [phone]   [email or web address]/[email or web address]/[email or web address]/  [facility]  [phone]   [email or web address]/  [facility] [place] [phone]   https://www.[employer]/locations/[place]  [place] [phone]   https://www.[employer]/locations/[facility] [phone]   https://www.[employer]/locations/[place]  [place] [phone]   https://www.[employer]/locations/lizbeth Clark [phone]   https://www.[employer]/locations/eldon Dixon [phone]   https://www.[employer]/locations/[place]  [place] [phone]   https://www.[employer]/locations/[facility] [phone]   https://www.[employer]/locations/[facility] [phone]   https://www.[employer]/locations/[facility] [phone]   https://www.[employer]/locations/[facility]    Please be aware that coverage of these services is subject to the terms and limitations of your health insurance plan.  Call member services at your health plan with any benefit or coverage questions.      Please bring the following to your appointment:    >>   Any x-rays, CTs or MRIs which have been performed.  Contact the facility where they were done to arrange for  prior to your scheduled appointment.    >>   List of current medications   >>   This referral request   >>   Any documents/labs given to you for this referral                  Your next 10 appointments already scheduled     Apr 17, 2018 11:00 AM CDT   Return Visit with Austen Marquez MD   [facility] ([facility])    [address]  [address]   [phone]            Apr 30, 2018  8:00 PM CDT   PSG Split with BK BED 1   [facility] ([facility] [place] [facility])    [address] [address]   [phone]            May 01, 2018  7:30 AM CDT   LAB with BK LAB   [facility]    [address] [facility] [address]   [phone]           Please do not eat 10-12 hours before your appointment if you are coming in fasting for labs on lipids, cholesterol, or glucose (sugar). This does not apply to pregnant women. Water, hot tea and black coffee (with nothing added) are okay. Do not drink other fluids, diet soda or chew gum.            May 01, 2018  8:00 AM CDT   Actigraphy Drop Off with BK SC DME   [facility] ([facility] [place] [facility])    [address] [address]   [phone]            May 01, 2018 10:45 AM CDT   Return Visit with Cata Hurst NP   [facility] ([facility])    [address] [place] [id number] [phone]            May 15, 2018 11:00 AM CDT   New Sleep Patient with Dashawn Colunga MD   [facility] ([facility] [place] [facility])    [address] [place] [address]   [phone]            May 24, 2018  2:30 PM CDT   Return Sleep Patient with Kimo Gaston PsyD   [facility] ([facility] [place] [facility])    [address] [address]   [phone]            May 29, 2018  3:00 PM CDT   (Arrive by 2:45 PM)   Return Botox with Frederick Bender MD   [facility] ([facility])    [address] [place] [id number] [phone]            Aug 21,  2018 11:00 AM CDT   (Arrive by 10:45 AM)   Return Botox with Frederick Bender MD   [facility] ([facility])    [address] [address] [phone]              Who to contact     If you have questions or need follow up information about today's clinic visit or your schedule please contact [facility] directly at [phone].  Normal or non-critical lab and imaging results will be communicated to you by T[employer]nobl[employer]art, letter or phone within 4 business days after the clinic has received the results. If you do not hear from us within 7 days, please contact the clinic through [employer] or phone. If you have a critical or abnormal lab result, we will notify you by phone as soon as possible.  Submit refill requests through [employer] or call your pharmacy and they will forward the refill request to us. Please allow 3 business days for your refill to be completed.          Additional Information About Your Visit        TecnobluharSave On Medical Information     [employer] gives you secure access to your electronic health record. If you see a primary care provider, you can also send messages to your care team and make appointments. If you have questions, please call your primary care clinic.  If you do not have a primary care provider, please call [phone] and they will assist you.        Care EveryWhere ID     This is your Care EveryWhere ID. This could be used by other organizations to access your [place] medical records  [id number]        Your Vitals Were     Pulse Temperature Pulse Oximetry BMI (Body Mass Index)          68 98.1  F (36.7  C) (Oral) 100% 26.46 kg/m2         Blood Pressure from Last 3 Encounters:   04/16/18 122/74   04/12/18 124/81   04/05/18 116/80    Weight from Last 3 Encounters:   04/16/18 147 lb (66.7 kg)   04/12/18 147 lb 6.4 oz (66.9 kg)   04/05/18 149 lb (67.6 kg)              We Performed the Following      CARDIOLOGY EVAL ADULT REFERRAL     MENTAL HEALTH REFERRAL  - Adult; Outpatient Treatment; Individual/Couples/Family/Group Therapy/Health Psychology; [facility] [phone]; We will contact you to schedule the appointment or please call with any questions     ORTHOPEDICS ADULT REFERRAL          Today's Medication Changes          These changes are accurate as of 4/16/18  2:18 PM.  If you have any questions, ask your nurse or doctor.               These medicines have changed or have updated prescriptions.        Dose/Directions    norgestimate-ethinyl estradiol 0.25-35 MG-MCG per tablet   Commonly known as:  SPRINTEC 28   This may have changed:  See the new instructions.   Used for:  General counseling for prescription of oral contraceptives   Changed by:  Sonja Abreu APRN CNP        Dose:  1 tablet   Take 1 tablet by mouth daily   Quantity:  84 tablet   Refills:  4            Where to get your medicines      Some of these will need a paper prescription and others can be bought over the counter.  Ask your nurse if you have questions.     Bring a paper prescription for each of these medications     norgestimate-ethinyl estradiol 0.25-35 MG-MCG per tablet                Primary Care Provider Office Phone # Fax #    EVI Cardona [phone]       [address]  [facility] [address]        Equal Access to Services     NABIL GALEANO : Hadii aad ku hadasho Soomaali, waaxda luqadaha, qaybta kaalmada adeegyada, mike parsons. So [facility] [phone].    ATENCIÓN: Si habla español, tiene a livingston disposición servicios gratuitos de asistencia lingüística. Llame al [phone].    We comply with applicable federal civil rights laws and [place] laws. We do not discriminate on the basis of race, color, national origin, age, disability, sex, sexual orientation, or gender identity.            Thank you!     Thank you for choosing  [facility]  for your care. Our goal is always to provide you with excellent care. Hearing back from our patients is one way we can continue to improve our services. Please take a few minutes to complete the written survey that you may receive in the mail after your visit with us. Thank you!             Your Updated Medication List - Protect others around you: Learn how to safely use, store and throw away your medicines at www.disposemymeds.org.          This list is accurate as of 4/16/18  2:18 PM.  Always use your most recent med list.                   Brand Name Dispense Instructions for use Diagnosis    albuterol 108 (90 Base) MCG/ACT Inhaler    PROAIR HFA/PROVENTIL HFA/VENTOLIN HFA    1 Inhaler    Inhale 2 puffs into the lungs every 6 hours as needed for shortness of breath / dyspnea or wheezing    Atypical chest pain       amitriptyline 50 MG tablet    ELAVIL    30 tablet    Take 1 tablet (50 mg) by mouth At Bedtime    Abdominal pain, generalized, Insomnia due to medical condition       apremilast 30 MG tablet    OTEZLA     Take 1 tablet (30 mg) by mouth 2 times daily    Behcet's disease (H)       ASPIRIN CHILDRENS 81 MG chewable tablet   Generic drug:  aspirin      Take 81 mg by mouth as needed        aspirin-acetaminophen-caffeine 250-250-65 MG per tablet    EXCEDRIN MIGRAINE     Take 1 tablet by mouth every 6 hours as needed for headaches        benzocaine 20 % Gel    TOPICALE XTRA    30 g    Apply as needed locally to mouth or nasal ulcers for pain; 4 times daily as needed    Behcet's disease (H)       betamethasone valerate 0.1 % cream    VALISONE     Apply topically 2 times daily        * BOTOX IJ      Inject 165 Units as directed once Lot#: /C3 Exp: 10/2020        * BOTOX IJ      Inject 165 Units as directed once Lot # /C3  Exp:  10/2020        * botulinum toxin type A 100 units injection    BOTOX    200 Units    Inject 200 Units into the muscle every 3 months    Cervical  dystonia       * BOTOX IJ      Inject 165 Units into the muscle once Lot /C3 Exp 06/2020        carbamide peroxide 6.5 % otic solution    DEBROX     5 drops 2 times daily        clobetasol 0.05 % cream    TEMOVATE    60 g    Apply topically 2 times daily    Folliculitis       colchicine 0.6 MG tablet     270 tablet    TAKE 2 TABLETS BY MOUTH EVERY MORNING AND 1 TABLET EVERY EVENING.    Behcet's disease (H)       COMPOUNDED NON-CONTROLLED SUBSTANCE - PHARMACY TO MIX COMPOUNDED MEDICATION    CMPD RX    30 capsule    Take one capsule in the morning    Fibromyalgia       cyproheptadine 4 MG tablet    PERIACTIN    30 tablet    Take 1 tablet (4 mg) by mouth At Bedtime For nightmares    Nightmares       diclofenac 1 % Gel topical gel    VOLTAREN    100 g    Apply 2-4 grams to affected area(s) up to 4 times per day as needed. This is an anti-inflammatory medication.    Polyarthralgia       dicyclomine 20 MG tablet    BENTYL    120 tablet    Take 1 tablet (20 mg) by mouth 4 times daily as needed    Abdominal cramping       diltiazem 2% in PLO cream (FV COMPOUNDED) 2% Gel     30 g    Apply small pea size amount three times daily to anus until pain is gone.    Anal fissure       diphenhydrAMINE 25 MG tablet    BENADRYL    30 tablet    Take 1 tablet (25 mg) by mouth every 8 hours as needed for allergies        EPINEPHrine 0.3 MG/0.3ML injection 2-pack    EPIPEN 2-EAMON    0.6 mL    Inject 0.3 mLs (0.3 mg) into the muscle as needed for anaphylaxis    Hx of bee sting allergy       * guanFACINE 1 MG tablet    TENEX    270 tablet    Take 3 tablets (3 mg) by mouth 2 times daily    Tic       * guanFACINE 1 MG tablet    TENEX    180 tablet    Take 3 tablets (3 mg) by mouth twice daily morning and evening.    Tic       hydrOXYzine 25 MG tablet    ATARAX    90 tablet    Take 1-2 tablets (25-50 mg) by mouth every 6 hours as needed for anxiety    TAHIR (generalized anxiety disorder)       hyoscyamine 0.125 MG tablet    ANASPAZ/LEVSIN     40 tablet    Take 1-2 tablets (125-250 mcg) by mouth every 4 hours as needed for cramping    Abdominal pain, generalized       hypromellose 0.3 % Soln ophthalmic solution    GENTEAL     1 drop every hour as needed for dry eyes        LACTAID PO      Take by mouth daily        lactulose 20 GM/30ML Soln     300 mL    Take 30 mLs by mouth 3 times daily as needed    Constipation, unspecified constipation type       lidocaine 2 % topical gel    XYLOCAINE     Apply 1 Tube topically daily Reported on 4/21/2017        lidocaine visc 2% 2.5mL/5mL & maalox/mylanta w/ simeth 2.5mL/5mL & diphenhydrAMINE 5mg/5mL Susp suspension    [facility] Mouthwash [facility]    1 Bottle    Swish and swallow 10 mLs in mouth every 6 hours as needed for mouth sores    Behcet's syndrome (H)       linaclotide 145 MCG capsule    LINZESS    90 capsule    Take 1 capsule (145 mcg) by mouth every morning (before breakfast)    Chronic idiopathic constipation       LORazepam 1 MG tablet    ATIVAN    60 tablet    Take 0.5 tablets (0.5 mg) by mouth 2 times daily as needed for other (atypical chest pain) Do not operate a vehicle after taking this medication    Chronic abdominal pain       lubiprostone 24 MCG capsule    AMITIZA    30 capsule    Take 1 capsule (24 mcg) by mouth 2 times daily (with meals)    Chronic idiopathic constipation       norgestimate-ethinyl estradiol 0.25-35 MG-MCG per tablet    SPRINTEC 28    84 tablet    Take 1 tablet by mouth daily    General counseling for prescription of oral contraceptives       omeprazole 40 MG capsule    priLOSEC    90 capsule    Take 1 capsule (40 mg) by mouth daily    Gastroesophageal reflux disease, esophagitis presence not specified       ondansetron 8 MG tablet    ZOFRAN    20 tablet    Take 1 tablet (8 mg) by mouth every 8 hours as needed for nausea    Nausea       sucralfate 1 GM/10ML suspension    CARAFATE    1200 mL    Take 10 mLs (1 g) by mouth 4 times daily    Bile reflux gastritis, Nausea        triamcinolone 0.1 % cream    KENALOG    15 g    Apply sparingly to oral ulcers three times daily for 14 days as needed.    Behcet's syndrome (H)       * Notice:  This list has 6 medication(s) that are the same as other medications prescribed for you. Read the directions carefully, and ask your doctor or other care provider to review them with you.

## 2018-04-17 ENCOUNTER — OFFICE VISIT (OUTPATIENT)
Dept: RHEUMATOLOGY | Facility: CLINIC | Age: 23
End: 2018-04-17
Payer: COMMERCIAL

## 2018-04-17 VITALS
HEART RATE: 101 BPM | BODY MASS INDEX: 27.05 KG/M2 | WEIGHT: 147 LBS | HEIGHT: 62 IN | SYSTOLIC BLOOD PRESSURE: 104 MMHG | OXYGEN SATURATION: 99 % | TEMPERATURE: 97.7 F | DIASTOLIC BLOOD PRESSURE: 70 MMHG

## 2018-04-17 DIAGNOSIS — R11.0 NAUSEA: ICD-10-CM

## 2018-04-17 DIAGNOSIS — M35.2 BEHCET'S DISEASE (H): ICD-10-CM

## 2018-04-17 PROCEDURE — 99213 OFFICE O/P EST LOW 20 MIN: CPT | Performed by: INTERNAL MEDICINE

## 2018-04-17 RX ORDER — COLCHICINE 0.6 MG/1
TABLET ORAL
Qty: 45 TABLET | Refills: 0 | Status: SHIPPED | OUTPATIENT
Start: 2018-04-17 | End: 2018-05-14

## 2018-04-17 RX ORDER — ONDANSETRON 8 MG/1
8 TABLET, FILM COATED ORAL EVERY 8 HOURS PRN
Qty: 60 TABLET | Refills: 1 | Status: SHIPPED | OUTPATIENT
Start: 2018-04-17 | End: 2019-04-08

## 2018-04-17 ASSESSMENT — ROUTINE ASSESSMENT OF PATIENT INDEX DATA (RAPID3)
TOTAL RAPID3 SCORE: 15.7
RAPID3 INTERPRETATION: HIGH > 12.0

## 2018-04-17 NOTE — NURSING NOTE
"Chief Complaint   Patient presents with     RECHECK       Initial /70 (BP Location: Left arm, Patient Position: Chair, Cuff Size: Adult Regular)  Pulse 101  Temp 97.7  F (36.5  C) (Oral)  Ht 1.575 m (5' 2\")  Wt 66.7 kg (147 lb)  SpO2 99%  BMI 26.89 kg/m2 Estimated body mass index is 26.89 kg/(m^2) as calculated from the following:    Height as of this encounter: 1.575 m (5' 2\").    Weight as of this encounter: 66.7 kg (147 lb).  Medication Reconciliation: complete    Have you ever seen a rheumatologist Yes Who You When 1/16/18  Joint pain history  Onset: pt states that she is not doing well. She is having increased stomach issues, only eats 2 times in 4 days unable to keep the otezla down due to vomiting . Has sleep study done in 1 week  Involved joints: see above   Pain scale:  7/10     Wakes the patient from sleep : Yes  Morning stiffness:Yes for 60 minutes  Meds used:otezla , colchicine, diclofenac gel, magic mouthwash, lidocaine gel     Interim history  Since last visit:  1. Infections - Yes/ had a sinus infection, thinks she is getting sick now  2. New symptoms/medical problem - Yes/ neurology sent pt to cardiology. Has a heart condition but not sure what . She is seeing a ortho. Due to knee pain   3. Any side effects from Rheum medications -nausea/vomiting from the otezla   3. ER visits/Hospitalizations/surgeries - Yes/ seen in ER   4. Last PCP visit: yesterday  Wt Readings from Last 4 Encounters:   04/17/18 66.7 kg (147 lb)   04/16/18 66.7 kg (147 lb)   04/12/18 66.9 kg (147 lb 6.4 oz)   04/05/18 67.6 kg (149 lb)     BP Readings from Last 3 Encounters:   04/17/18 104/70   04/16/18 122/74   04/12/18 124/81       "

## 2018-04-17 NOTE — MR AVS SNAPSHOT
After Visit Summary   4/17/2018    Samara Oropeza    MRN: [id number]           Patient Information     Date Of Birth          1995        Visit Information        Provider Department      4/17/2018 11:00 AM Austen Marquez MD [facility]        Today's Diagnoses     Nausea        Behcet's disease (H)           Follow-ups after your visit        Follow-up notes from your care team     Return in about 3 months (around 7/17/2018).      Your next 10 appointments already scheduled     Apr 30, 2018  8:00 PM CDT   PSG Split with BK BED 1   [facility] ([facility])    [address]  [facility] [address]   [phone]            May 01, 2018  7:30 AM CDT   LAB with [facility]   [facility] ([facility])    [address] [address]   [phone]           Please do not eat 10-12 hours before your appointment if you are coming in fasting for labs on lipids, cholesterol, or glucose (sugar). This does not apply to pregnant women. Water, hot tea and black coffee (with nothing added) are okay. Do not drink other fluids, diet soda or chew gum.            May 01, 2018  8:00 AM CDT   Actigraphy Drop Off with SUJIT SC DME   [facility] ([facility])    [address]  [place] [address]   [phone]            May 01, 2018 10:45 AM CDT   Return Visit with Cata Hurst NP   [facility]    [address]  [address]  [facility] [address]   [phone]            May 15, 2018 11:00 AM CDT   New Sleep Patient with Dashawn Colunga MD   [facility] ([facility])    [address] [place] [address]  [facility] [address]   [phone]            May 24, 2018  2:30  PM CDT   Return Sleep Patient with Kimo Gaston PsyD   [facility] ([facility])    [address] [address]   [phone]            May 29, 2018  3:00 PM CDT   (Arrive by 2:45 PM)   Return Botox with Frederick Bender MD   [facility] ([facility])    [address] [place] [id number] [phone]            Aug 21, 2018 11:00 AM CDT   (Arrive by 10:45 AM)   Return Botox with Frederick Bender MD   [facility] ([facility])    [address] [place] [id number] [phone]              Who to contact     If you have questions or need follow up information about today's clinic visit or your schedule please contact [facility] directly at [phone].  Normal or non-critical lab and imaging results will be communicated to you by LendUphart, letter or phone within 4 business days after the clinic has received the results. If you do not hear from us within 7 days, please contact the clinic through Chayamunit or phone. If you have a critical or abnormal lab result, we will notify you by phone as soon as possible.  Submit refill requests through [email or web address] or call your pharmacy and they will forward the refill request to us. Please allow 3 business days for your refill to be completed.          Additional Information About Your Visit        [email or web address] Information     [email or web address] gives you secure access to your electronic health record. If you see a primary care provider, you can also send messages to your care team and make appointments. If you have questions, please call your primary care clinic.  If you do not have a primary care provider, please call [phone] and they will assist you.        Care EveryWhere ID     This is your Care  "EveryWhere ID. This could be used by other organizations to access your [facility] medical records  [id number]        Your Vitals Were     Pulse Temperature Height Pulse Oximetry BMI (Body Mass Index)       101 97.7  F (36.5  C) (Oral) 1.575 m (5' 2\") 99% 26.89 kg/m2        Blood Pressure from Last 3 Encounters:   04/17/18 104/70   04/16/18 122/74   04/12/18 124/81    Weight from Last 3 Encounters:   04/17/18 66.7 kg (147 lb)   04/16/18 66.7 kg (147 lb)   04/12/18 66.9 kg (147 lb 6.4 oz)              Today, you had the following     No orders found for display         Today's Medication Changes          These changes are accurate as of 4/17/18 11:44 AM.  If you have any questions, ask your nurse or doctor.               These medicines have changed or have updated prescriptions.        Dose/Directions    colchicine 0.6 MG tablet   This may have changed:  additional instructions   Used for:  Behcet's disease (H)   Changed by:  Austen Marquez MD        0.6mg twice daily X 2 weeks and then 0.6mg daily X 2 weeks and then stop.   Quantity:  45 tablet   Refills:  0            Where to get your medicines      These medications were sent to [facility] - [address] [address]     Phone:  [phone]     colchicine 0.6 MG tablet    ondansetron 8 MG tablet                Primary Care Provider Office Phone # Fax #    Sonja Annabella Abreu, APRN [facility] [phone]       [address] [facility] [address]  [facility] [address]        Equal Access to Services     NABIL GALEANO AH: Brandon Santiago, channing lubridget, qakarta kaalmada yessica, mike parsons. So [facility] [phone].    ATENCIÓN: Si habla español, tiene a livingston disposición servicios gratuitos de asistencia lingüística. Llame al [phone].    We comply with applicable federal civil rights laws and [place] laws. We do not " discriminate on the basis of race, color, national origin, age, disability, sex, sexual orientation, or gender identity.            Thank you!     Thank you for choosing [facility]  for your care. Our goal is always to provide you with excellent care. Hearing back from our patients is one way we can continue to improve our services. Please take a few minutes to complete the written survey that you may receive in the mail after your visit with us. Thank you!             Your Updated Medication List - Protect others around you: Learn how to safely use, store and throw away your medicines at www.disposemymeds.org.          This list is accurate as of 4/17/18 11:44 AM.  Always use your most recent med list.                   Brand Name Dispense Instructions for use Diagnosis    albuterol 108 (90 Base) MCG/ACT Inhaler    PROAIR HFA/PROVENTIL HFA/VENTOLIN HFA    1 Inhaler    Inhale 2 puffs into the lungs every 6 hours as needed for shortness of breath / dyspnea or wheezing    Atypical chest pain       amitriptyline 50 MG tablet    ELAVIL    30 tablet    Take 1 tablet (50 mg) by mouth At Bedtime    Abdominal pain, generalized, Insomnia due to medical condition       apremilast 30 MG tablet    OTEZLA    180 tablet    Take 1 tablet (30 mg) by mouth 2 times daily    Behcet's disease (H)       ASPIRIN CHILDRENS 81 MG chewable tablet   Generic drug:  aspirin      Take 81 mg by mouth as needed        aspirin-acetaminophen-caffeine 250-250-65 MG per tablet    EXCEDRIN MIGRAINE     Take 1 tablet by mouth every 6 hours as needed for headaches        benzocaine 20 % Gel    TOPICALE XTRA    30 g    Apply as needed locally to mouth or nasal ulcers for pain; 4 times daily as needed    Behcet's disease (H)       betamethasone valerate 0.1 % cream    VALISONE     Apply topically 2 times daily        * BOTOX IJ      Inject 165 Units as directed once Lot#: /C3 Exp: 10/2020        * BOTOX IJ      Inject 165  Units as directed once Lot # /C3  Exp:  10/2020        * botulinum toxin type A 100 units injection    BOTOX    200 Units    Inject 200 Units into the muscle every 3 months    Cervical dystonia       * BOTOX IJ      Inject 165 Units into the muscle once Lot /C3 Exp 06/2020        carbamide peroxide 6.5 % otic solution    DEBROX     5 drops 2 times daily        clobetasol 0.05 % cream    TEMOVATE    60 g    Apply topically 2 times daily    Folliculitis       colchicine 0.6 MG tablet     45 tablet    0.6mg twice daily X 2 weeks and then 0.6mg daily X 2 weeks and then stop.    Behcet's disease (H)       COMPOUNDED NON-CONTROLLED SUBSTANCE - PHARMACY TO MIX COMPOUNDED MEDICATION    CMPD RX    30 capsule    Take one capsule in the morning    Fibromyalgia       cyproheptadine 4 MG tablet    PERIACTIN    30 tablet    Take 1 tablet (4 mg) by mouth At Bedtime For nightmares    Nightmares       diclofenac 1 % Gel topical gel    VOLTAREN    100 g    Apply 2-4 grams to affected area(s) up to 4 times per day as needed. This is an anti-inflammatory medication.    Polyarthralgia       dicyclomine 20 MG tablet    BENTYL    120 tablet    Take 1 tablet (20 mg) by mouth 4 times daily as needed    Abdominal cramping       diltiazem 2% in PLO cream (FV COMPOUNDED) 2% Gel     30 g    Apply small pea size amount three times daily to anus until pain is gone.    Anal fissure       diphenhydrAMINE 25 MG tablet    BENADRYL    30 tablet    Take 1 tablet (25 mg) by mouth every 8 hours as needed for allergies        EPINEPHrine 0.3 MG/0.3ML injection 2-pack    EPIPEN 2-EAMON    0.6 mL    Inject 0.3 mLs (0.3 mg) into the muscle as needed for anaphylaxis    Hx of bee sting allergy       * guanFACINE 1 MG tablet    TENEX    270 tablet    Take 3 tablets (3 mg) by mouth 2 times daily    Tic       * guanFACINE 1 MG tablet    TENEX    180 tablet    Take 3 tablets (3 mg) by mouth twice daily morning and evening.    Tic       hydrOXYzine 25 MG  tablet    ATARAX    90 tablet    Take 1-2 tablets (25-50 mg) by mouth every 6 hours as needed for anxiety    TAHIR (generalized anxiety disorder)       hyoscyamine 0.125 MG tablet    ANASPAZ/LEVSIN    40 tablet    Take 1-2 tablets (125-250 mcg) by mouth every 4 hours as needed for cramping    Abdominal pain, generalized       hypromellose 0.3 % Soln ophthalmic solution    GENTEAL     1 drop every hour as needed for dry eyes        LACTAID PO      Take by mouth daily        lactulose 20 GM/30ML Soln     300 mL    Take 30 mLs by mouth 3 times daily as needed    Constipation, unspecified constipation type       lidocaine 2 % topical gel    XYLOCAINE     Apply 1 Tube topically daily Reported on 4/21/2017        lidocaine visc 2% 2.5mL/5mL & maalox/mylanta w/ simeth 2.5mL/5mL & diphenhydrAMINE 5mg/5mL Susp suspension    [facility] [place]    1 Bottle    Swish and swallow 10 mLs in mouth every 6 hours as needed for mouth sores    Behcet's syndrome (H)       linaclotide 145 MCG capsule    LINZESS    90 capsule    Take 1 capsule (145 mcg) by mouth every morning (before breakfast)    Chronic idiopathic constipation       LORazepam 1 MG tablet    ATIVAN    60 tablet    Take 0.5 tablets (0.5 mg) by mouth 2 times daily as needed for other (atypical chest pain) Do not operate a vehicle after taking this medication    Chronic abdominal pain       lubiprostone 24 MCG capsule    AMITIZA    30 capsule    Take 1 capsule (24 mcg) by mouth 2 times daily (with meals)    Chronic idiopathic constipation       * norgestimate-ethinyl estradiol 0.25-35 MG-MCG per tablet    SPRINTEC 28    84 tablet    Take 1 tablet by mouth daily    General counseling for prescription of oral contraceptives       * SPRINTEC 28 0.25-35 MG-MCG per tablet   Generic drug:  norgestimate-ethinyl estradiol     84 tablet    1 by mouth daily    General counseling for prescription of oral contraceptives       omeprazole 40 MG capsule    priLOSEC    90 capsule     Take 1 capsule (40 mg) by mouth daily    Gastroesophageal reflux disease, esophagitis presence not specified       ondansetron 8 MG tablet    ZOFRAN    60 tablet    Take 1 tablet (8 mg) by mouth every 8 hours as needed for nausea    Nausea       sucralfate 1 GM/10ML suspension    CARAFATE    1200 mL    Take 10 mLs (1 g) by mouth 4 times daily    Bile reflux gastritis, Nausea       triamcinolone 0.1 % cream    KENALOG    15 g    Apply sparingly to oral ulcers three times daily for 14 days as needed.    Behcet's syndrome (H)       * Notice:  This list has 8 medication(s) that are the same as other medications prescribed for you. Read the directions carefully, and ask your doctor or other care provider to review them with you.

## 2018-04-18 ENCOUNTER — APPOINTMENT (OUTPATIENT)
Dept: ULTRASOUND IMAGING | Facility: CLINIC | Age: 23
End: 2018-04-18
Attending: EMERGENCY MEDICINE
Payer: COMMERCIAL

## 2018-04-18 ENCOUNTER — HOSPITAL ENCOUNTER (EMERGENCY)
Facility: CLINIC | Age: 23
Discharge: HOME | End: 2018-04-18
Attending: EMERGENCY MEDICINE | Admitting: EMERGENCY MEDICINE
Payer: COMMERCIAL

## 2018-04-18 ENCOUNTER — TELEPHONE (OUTPATIENT)
Dept: FAMILY MEDICINE | Facility: CLINIC | Age: 23
End: 2018-04-18

## 2018-04-18 VITALS
WEIGHT: 147 LBS | HEART RATE: 79 BPM | HEIGHT: 62 IN | RESPIRATION RATE: 20 BRPM | OXYGEN SATURATION: 100 % | BODY MASS INDEX: 27.05 KG/M2 | SYSTOLIC BLOOD PRESSURE: 122 MMHG | DIASTOLIC BLOOD PRESSURE: 79 MMHG | TEMPERATURE: 98 F

## 2018-04-18 DIAGNOSIS — R10.31 RLQ ABDOMINAL PAIN: ICD-10-CM

## 2018-04-18 LAB
ALBUMIN SERPL-MCNC: 4 G/DL (ref 3.4–5)
ALBUMIN UR-MCNC: NEGATIVE MG/DL
ALP SERPL-CCNC: 81 U/L (ref 40–150)
ALT SERPL W P-5'-P-CCNC: 28 U/L (ref 0–50)
ANION GAP SERPL CALCULATED.3IONS-SCNC: 10 MMOL/L (ref 3–14)
APPEARANCE UR: CLEAR
AST SERPL W P-5'-P-CCNC: 26 U/L (ref 0–45)
BASOPHILS # BLD AUTO: 0 10E9/L (ref 0–0.2)
BASOPHILS NFR BLD AUTO: 0.2 %
BILIRUB SERPL-MCNC: 0.5 MG/DL (ref 0.2–1.3)
BILIRUB UR QL STRIP: NEGATIVE
BUN SERPL-MCNC: 11 MG/DL (ref 7–30)
CALCIUM SERPL-MCNC: 9 MG/DL (ref 8.5–10.1)
CHLORIDE SERPL-SCNC: 108 MMOL/L (ref 94–109)
CO2 SERPL-SCNC: 24 MMOL/L (ref 20–32)
COLOR UR AUTO: YELLOW
CREAT SERPL-MCNC: 0.77 MG/DL (ref 0.52–1.04)
CRP SERPL-MCNC: <2.9 MG/L (ref 0–8)
DIFFERENTIAL METHOD BLD: NORMAL
EOSINOPHIL # BLD AUTO: 0.1 10E9/L (ref 0–0.7)
EOSINOPHIL NFR BLD AUTO: 2.2 %
ERYTHROCYTE [DISTWIDTH] IN BLOOD BY AUTOMATED COUNT: 14.2 % (ref 10–15)
GFR SERPL CREATININE-BSD FRML MDRD: >90 ML/MIN/1.7M2
GLUCOSE SERPL-MCNC: 70 MG/DL (ref 70–99)
GLUCOSE UR STRIP-MCNC: NEGATIVE MG/DL
HCG UR QL: NEGATIVE
HCT VFR BLD AUTO: 36.6 % (ref 35–47)
HGB BLD-MCNC: 11.7 G/DL (ref 11.7–15.7)
HGB UR QL STRIP: NEGATIVE
HYALINE CASTS #/AREA URNS LPF: 1 /LPF (ref 0–2)
IMM GRANULOCYTES # BLD: 0 10E9/L (ref 0–0.4)
IMM GRANULOCYTES NFR BLD: 0 %
INTERNAL QC OK POCT: YES
KETONES UR STRIP-MCNC: 10 MG/DL
LEUKOCYTE ESTERASE UR QL STRIP: NEGATIVE
LIPASE SERPL-CCNC: 101 U/L (ref 73–393)
LYMPHOCYTES # BLD AUTO: 2.4 10E9/L (ref 0.8–5.3)
LYMPHOCYTES NFR BLD AUTO: 48.3 %
MCH RBC QN AUTO: 27.9 PG (ref 26.5–33)
MCHC RBC AUTO-ENTMCNC: 32 G/DL (ref 31.5–36.5)
MCV RBC AUTO: 87 FL (ref 78–100)
MONOCYTES # BLD AUTO: 0.3 10E9/L (ref 0–1.3)
MONOCYTES NFR BLD AUTO: 5.9 %
MUCOUS THREADS #/AREA URNS LPF: PRESENT /LPF
NEUTROPHILS # BLD AUTO: 2.2 10E9/L (ref 1.6–8.3)
NEUTROPHILS NFR BLD AUTO: 43.4 %
NITRATE UR QL: NEGATIVE
NRBC # BLD AUTO: 0 10*3/UL
NRBC BLD AUTO-RTO: 0 /100
PH UR STRIP: 5 PH (ref 5–7)
PLATELET # BLD AUTO: 215 10E9/L (ref 150–450)
POTASSIUM SERPL-SCNC: 3.9 MMOL/L (ref 3.4–5.3)
PROT SERPL-MCNC: 7.6 G/DL (ref 6.8–8.8)
RBC # BLD AUTO: 4.19 10E12/L (ref 3.8–5.2)
RBC #/AREA URNS AUTO: 1 /HPF (ref 0–2)
SODIUM SERPL-SCNC: 142 MMOL/L (ref 133–144)
SOURCE: ABNORMAL
SP GR UR STRIP: 1.02 (ref 1–1.03)
SPECIMEN SOURCE: ABNORMAL
SQUAMOUS #/AREA URNS AUTO: <1 /HPF (ref 0–1)
UROBILINOGEN UR STRIP-MCNC: NORMAL MG/DL (ref 0–2)
WBC # BLD AUTO: 5 10E9/L (ref 4–11)
WBC #/AREA URNS AUTO: 1 /HPF (ref 0–5)
WET PREP SPEC: ABNORMAL

## 2018-04-18 PROCEDURE — 25000128 H RX IP 250 OP 636: Performed by: EMERGENCY MEDICINE

## 2018-04-18 PROCEDURE — 96360 HYDRATION IV INFUSION INIT: CPT | Performed by: EMERGENCY MEDICINE

## 2018-04-18 PROCEDURE — 99284 EMERGENCY DEPT VISIT MOD MDM: CPT | Mod: 25 | Performed by: EMERGENCY MEDICINE

## 2018-04-18 PROCEDURE — 99284 EMERGENCY DEPT VISIT MOD MDM: CPT | Mod: Z6 | Performed by: EMERGENCY MEDICINE

## 2018-04-18 PROCEDURE — 93976 VASCULAR STUDY: CPT

## 2018-04-18 PROCEDURE — 80053 COMPREHEN METABOLIC PANEL: CPT | Performed by: EMERGENCY MEDICINE

## 2018-04-18 PROCEDURE — 87210 SMEAR WET MOUNT SALINE/INK: CPT | Performed by: EMERGENCY MEDICINE

## 2018-04-18 PROCEDURE — 86140 C-REACTIVE PROTEIN: CPT | Performed by: EMERGENCY MEDICINE

## 2018-04-18 PROCEDURE — 83690 ASSAY OF LIPASE: CPT | Performed by: EMERGENCY MEDICINE

## 2018-04-18 PROCEDURE — 85025 COMPLETE CBC W/AUTO DIFF WBC: CPT | Performed by: EMERGENCY MEDICINE

## 2018-04-18 PROCEDURE — 81025 URINE PREGNANCY TEST: CPT | Performed by: FAMILY MEDICINE

## 2018-04-18 PROCEDURE — 81001 URINALYSIS AUTO W/SCOPE: CPT | Performed by: FAMILY MEDICINE

## 2018-04-18 PROCEDURE — 87591 N.GONORRHOEAE DNA AMP PROB: CPT | Performed by: EMERGENCY MEDICINE

## 2018-04-18 RX ORDER — SODIUM CHLORIDE 9 MG/ML
1000 INJECTION, SOLUTION INTRAVENOUS CONTINUOUS
Status: DISCONTINUED | OUTPATIENT
Start: 2018-04-18 | End: 2018-04-18 | Stop reason: HOSPADM

## 2018-04-18 RX ADMIN — SODIUM CHLORIDE 1000 ML: 9 INJECTION, SOLUTION INTRAVENOUS at 17:39

## 2018-04-18 ASSESSMENT — ENCOUNTER SYMPTOMS
ABDOMINAL DISTENTION: 1
HEMATURIA: 0
ABDOMINAL PAIN: 1
DYSURIA: 0

## 2018-04-18 NOTE — TELEPHONE ENCOUNTER
"Samara Oropeza is a 22 year old female who calls with abdominal pain.    NURSING ASSESSMENT:  Description:  Patient calling with abdominal pain - dx is chronic - using hyoscyamine (ANASPAZ/LEVSIN) 0.125 MG tablet per provider to manage symptoms - calling because she has abdominal pain doesn't want to go to the ED - patient seen provider at [facility] - is requesting to be seen by Brady    Onset/duration:  4/18/2018 6 am  Precip. factors:  Chronic abdominal pain  Associated symptoms:   1. Abdominal Pain - \"entire stomach area\" - RLL is the worst - \"bumps\" - harder - pain: above a 10/10 -sharp, throbbing,  \"wrapping around to lower back\" - \"I feel very weak and light headed\" - patient is able to stand walk to bathroom - doesn't feel like she may faint - patient is driving her car, performing daily ADL's - going to appointment - she is not screaming, crying, or has passed out  2. Baseline 'cold sweats' - no change - hasn't checked temperature  3. Denies black, blood, tarry, sticky stool, nausea, vomiting  4. 2 Hyoscyamine, 2 Hydroxyzine - waited 3 hours 1130 - 0.5 mg ativan - practicing breathing exercises  5. Last bowel Movement - yesterday - 'normal'  6. Has not been able to eat and drink  - symptoms worsen -     Improves/worsens symptoms:  Rest/eating  Pain scale (0-10)   10/10    Last exam/Treatment:  4/16/2018  Allergies:   Allergies   Allergen Reactions     Amoxil [Penicillins] Rash     Dad unsure of reaction.     Bee Venom Anaphylaxis     Contrast Dye Rash     Contrast Media Ready-Box [facility], 04/09/2014.; Contrast Media Ready-Box [facility], 04/09/2014.  NOTE: this is a contrast media oral with iodine. Premedicate with methylpred standard for IV contrast, request barium contrast for oral contrast.     Kiwi Swelling     Orange Fruit [Citrus] Anaphylaxis     Pineapple Anaphylaxis, Difficulty breathing and Rash     Reglan [Metoclopramide] Other (See Comments)     IV dose only, in ER, rapid heart rate.     Ace " Inhibitors      Difficulty in breathing and GI upset     Amitiza [Lubiprostone] Nausea and Vomiting     Amoxicillin-Pot Clavulanate      Latex      Midazolam Unknown     Other reaction(s): Unknown  parent states that when pt takes this medication, she wakes up being very violent .  parent states that when pt takes this medication, she wakes up being very violent .     No Clinical Screening - See Comments      Bleech/ chest tightness, itchy throat, swollen tongue, hives     Tomatoes [Tomato]      Versed      Coming out of pelvic exam at age of 5, was kicking and screaming when coming out of the versed.     Adhesive Tape Rash     Azithromycin Hives and Rash     Cephalexin Itching and Rash     Itchy mouth  Itchy mouth     Keflex [Cephalexin-Fd&C Yellow #6] Hives         MEDICATIONS:   Taking medication(s) as prescribed? Yes  Taking over the counter medication(s?) N/A  Any medication side effects? No significant side effects    Any barriers to taking medication(s) as prescribed?  Yes  Medication(s) improving/managing symptoms?  No  Medication reconciliation completed: Yes      NURSING PLAN: Routed to provider Yes    RECOMMENDED DISPOSITION:  To ED, another person to drive - see above - patient declines states doesn't want to sit and cry in waiting room for 8 hours - reviewed taking pain 10/10 seriously and associated with getting hit by a car, screaming, passing out - discussed team takes these symptoms seriously and recommend ED for any pain 10/10 - advised her if symptoms get worse if she feels weak like she cannot stand or may faint - needs to call 911 or have another person drive - patient verbalized understanding      Will comply with recommendation: Yes  If further questions/concerns or if symptoms do not improve, worsen or new symptoms develop, call your PCP or [facility] as soon as possible.      Guideline used:  Telephone Triage Protocols for Nurses, Fifth Edition, Ashtyn Locke,  RN

## 2018-04-18 NOTE — TELEPHONE ENCOUNTER
Huddle with provider Darrick Abreu - patient being worked up by specialist for possible movement of GI system throughout abdominal area - please advise ED patient needs work up for abdominal pain and US    Writer called patient and discussed provider recommendations - she verbalized understanding and will go to [facility] ED    Closing encounter - no further actions needed at this time    Wes Locke RN

## 2018-04-18 NOTE — ED PROVIDER NOTES
"    History     Chief Complaint   Patient presents with     Abdominal Pain     Started at 0600. Pain started in RLQ and now across lower abd. \"I have a lot of health problems and I don't want to miss anything\"     The history is provided by the patient and medical records.     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 Per review of her chart, she had both a endoscopy and colonoscopy in January of this year that were negative. She currently sees a GI doctor in [place]. Patient states she woke up at 6 AM today and while brushing her teeth developed acute, onset RLQ abdominal pain. She took hydroxyzine and hyoscyamine and used a heating pad at 10 AM without relief of her symptoms, so she tried taking 0.5 mg of ativan at 11:30 am again without relief. She states the pain has gotten worse and rates it \"10+/10\". She describes the pain as stabbing and twisting and states it's mad worse with any movement. She states this abdominal pain is different from her chronic abdominal pain which she describes is more cramping. She otherwise currently denies any dysuria, hematuria, chance of pregnancy, previous abdominal surgeries, or vaginal symptoms.     RPAST MEDICAL HISTORY  Past Medical History:   Diagnosis Date     Anxiety      Arthritis      Behcet's disease (H)      Cervical adenitis May 2010     Chronic abdominal pain      Constipation, chronic 1994     Gastro-oesophageal reflux disease      Gastroparesis      Migraines      Neuromuscular disorder (H)     fibramyalgia     Palpitations      Seizure (H)      Seizures (H)     unknown etiology     Syncope      Tourette's      PAST SURGICAL HISTORY  Past Surgical History:   Procedure Laterality Date     ARTHROSCOPY KNEE WITH PATELLAR REALIGNMENT  7/25/2013    Procedure: ARTHROSCOPY KNEE WITH PATELLAR REALIGNMENT;  " Left Knee Arthroscopy, Medial Patellofemoral Ligament Reconstruction with Allograft  ;  Surgeon: Jennifer Acevedo MD;  Location: [facility]     COLONOSCOPY  2015     DENTAL SURGERY  1996    Teeth removal     ENDOSCOPY UPPER, COLONOSCOPY, COMBINED  2005     HC ESOPH/GAS REFLUX TEST W NASAL IMPED >1 HR N/A 2/15/2017    Procedure: ESOPHAGEAL IMPEDENCE FUNCTION TEST WITH 24 HOUR PH GREATER THAN 1 HOUR;  Surgeon: Timothy Matta MD;  Location: [facility]     FAMILY HISTORY  Family History   Problem Relation Age of Onset     Depression Mother      Neurologic Disorder Mother      Migraines, take imitrex injection.  Also in maternal grandmother.       Alcohol/Drug Father      Hypertension Father      Depression Father      Cardiovascular Maternal Grandmother      Depression Maternal Grandmother      Hypertension Maternal Grandmother      Alzheimer Disease Maternal Grandmother      Cardiovascular Maternal Grandfather      Hypertension Maternal Grandfather      Depression Maternal Grandfather      Alcohol/Drug Maternal Grandfather      Cardiovascular Paternal Grandmother      Hypertension Paternal Grandmother      Cardiovascular Paternal Grandfather      Hypertension Paternal Grandfather      Glaucoma No family hx of      Macular Degeneration No family hx of      SOCIAL HISTORY  Social History   Substance Use Topics     Smoking status: Never Smoker     Smokeless tobacco: Never Used     Alcohol use 0.6 oz/week     1 Standard drinks or equivalent per week      Comment: rarely     MEDICATIONS  Current Facility-Administered Medications   Medication     sodium chloride 0.9% infusion     Current Outpatient Prescriptions   Medication     albuterol (PROAIR HFA/PROVENTIL HFA/VENTOLIN HFA) 108 (90 BASE) MCG/ACT Inhaler     amitriptyline (ELAVIL) 50 MG tablet     apremilast (OTEZLA) 30 MG tablet     aspirin (ASPIRIN CHILDRENS) 81 MG chewable tablet     aspirin-acetaminophen-caffeine (EXCEDRIN MIGRAINE) 250-250-65 MG per tablet      benzocaine (TOPICALE XTRA) 20 % GEL     betamethasone valerate (VALISONE) 0.1 % cream     botulinum toxin type A (BOTOX) 100 UNITS injection     carbamide peroxide (DEBROX) 6.5 % otic solution     clobetasol (TEMOVATE) 0.05 % cream     colchicine 0.6 MG tablet     COMPOUNDED NON-CONTROLLED SUBSTANCE (CMPD RX) - PHARMACY TO MIX COMPOUNDED MEDICATION     cyproheptadine (PERIACTIN) 4 MG tablet     diclofenac (VOLTAREN) 1 % GEL topical gel     dicyclomine (BENTYL) 20 MG tablet     diltiazem 2% in PLO cream, FV COMPOUNDED, 2% GEL     diphenhydrAMINE (BENADRYL) 25 MG tablet     EPINEPHrine (EPIPEN 2-EAMON) 0.3 MG/0.3ML injection     guanFACINE (TENEX) 1 MG tablet     guanFACINE (TENEX) 1 MG tablet     hydrOXYzine (ATARAX) 25 MG tablet     hyoscyamine (ANASPAZ/LEVSIN) 0.125 MG tablet     hypromellose (GENTEAL) 0.3 % SOLN     Lactase (LACTAID PO)     lactulose 20 GM/30ML SOLN     lidocaine (XYLOCAINE) 2 % jelly     linaclotide (LINZESS) 145 MCG capsule     LORazepam (ATIVAN) 1 MG tablet     lubiprostone (AMITIZA) 24 MCG capsule     Magic Mouthwash (FV std formula) lidocaine visc 2% 2.5mL/5mL & maalox/mylanta w/ simeth 2.5mL/5mL & diphenhydrAMINE 5mg/5mL     norgestimate-ethinyl estradiol (SPRINTEC 28) 0.25-35 MG-MCG per tablet     omeprazole (PRILOSEC) 40 MG capsule     OnabotulinumtoxinA (BOTOX IJ)     OnabotulinumtoxinA (BOTOX IJ)     OnabotulinumtoxinA (BOTOX IJ)     ondansetron (ZOFRAN) 8 MG tablet     SPRINTEC 28 0.25-35 MG-MCG per tablet     sucralfate (CARAFATE) 1 GM/10ML suspension     triamcinolone (KENALOG) 0.1 % cream     ALLERGIES  Allergies   Allergen Reactions     Amoxil [Penicillins] Rash     Dad unsure of reaction.     Bee Venom Anaphylaxis     Contrast Dye Rash     Contrast Media Ready-Box [facility] [place], 04/09/2014.; Contrast Media Ready-Box [facility], 04/09/2014.  NOTE: this is a contrast media oral with iodine. Premedicate with methylpred standard for IV contrast, request barium contrast for oral contrast.     Kiwi  "Swelling     Orange Fruit [Citrus] Anaphylaxis     Pineapple Anaphylaxis, Difficulty breathing and Rash     Reglan [Metoclopramide] Other (See Comments)     IV dose only, in ER, rapid heart rate.     Ace Inhibitors      Difficulty in breathing and GI upset     Amitiza [Lubiprostone] Nausea and Vomiting     Amoxicillin-Pot Clavulanate      Latex      Midazolam Unknown     Other reaction(s): Unknown  parent states that when pt takes this medication, she wakes up being very violent .  parent states that when pt takes this medication, she wakes up being very violent .     No Clinical Screening - See Comments      Bleech/ chest tightness, itchy throat, swollen tongue, hives     Versed      Coming out of pelvic exam at age of 5, was kicking and screaming when coming out of the versed.     Adhesive Tape Rash     Azithromycin Hives and Rash     Cephalexin Itching and Rash     Itchy mouth  Itchy mouth     Keflex [Cephalexin-Fd&C Yellow #6] Hives         I have reviewed the Medications, Allergies, Past Medical and Surgical History, and Social History in the Epic system.    Review of Systems   Gastrointestinal: Positive for abdominal distention and abdominal pain (RLQ).   Genitourinary: Negative for dysuria, hematuria, vaginal bleeding and vaginal discharge.   All other systems reviewed and are negative.      Physical Exam   BP: 109/69  Heart Rate: 103  Temp: 98.3  F (36.8  C)  Resp: 16  Height: 157.5 cm (5' 2\")  Weight: 66.7 kg (147 lb)  SpO2: 100 %      Physical Exam  General: awake, alert, NAD  Head: normal cephalic  HEENT: pupils equal, conjugate gaze in tact  Neck: Supple  CV: regular rate and rhythm without murmur  Lungs: clear to ascultation  Abd: Soft, patient has initial tenderness on her right lower quadrant, suprapubic and left lower quadrant.  No guarding, no peritoneal signs.  On repeat abdominal exam patient had only minimal tenderness in her left upper quadrant.  Back: No flank tenderness bilaterally  : " Normal-appearing female genitalia.  No lesions appreciated.  Normal-appearing cervix.  No significant discharge.  No cervical motion tenderness, no significant right adnexal tenderness.  EXT: lower extremities without swelling or edema  Neuro: awake, answers questions appropriately. No focal deficits noted       ED Course     ED Course     Procedures   5:02 PM  The patient was seen and examined by Dr. Olivarez in Room 6.                Labs Ordered and Resulted from Time of ED Arrival Up to the Time of Departure from the ED   ROUTINE UA WITH MICROSCOPIC REFLEX TO CULTURE - Abnormal; Notable for the following:        Result Value    Ketones Urine 10 (*)     Mucous Urine Present (*)     All other components within normal limits   WET PREP - Abnormal; Notable for the following:     Wet Prep   (*)     Value: Rare  Clue cells seen      All other components within normal limits   HCG QUAL URINE POCT - Normal   CBC WITH PLATELETS DIFFERENTIAL   COMPREHENSIVE METABOLIC PANEL   LIPASE   CRP INFLAMMATION   PERIPHERAL IV CATHETER   NEISSERIA GONORRHOEAE PCR            Assessments & Plan (with Medical Decision Making)     Samara Oropeza is a 22-year-old female with Behcet's and chronic abdominal pain who presents with acute onset right lower quadrant pain.  Patient states this is unlike her typical abdominal pain. Per chart review patient has had extensive evaluation of the ER for abdominal pain including 3 CT scans in the last calendar year and several ultrasounds.  Patient is currently well appearing, stable vital signs, with an unremarkable abdominal exam. Differential includes ovarian torsion, ectopic pregnancy, renal colic, appendicitis or other acute intra-abdominal pathology.  Initial plan will be lab work and reassessment.  Initial labs notable for normal normal white count with no left shift, no elevated CRP. She has normal electrolytes, LFTs and normal lipase.     Patient has a negative UPT which rules out ectopic  ectopic pregnancy as a potential offending cause of her abdominal pain, urine without significant blood or signs of infection.  At this point ultrasound of her pelvis is pending to rule out ovarian torsion as she has had ovarian cysts and enlarged ovaries on previous US in the past.  Patient denies cervical motion tenderness nor did she endorse any other pelvic or vaginal complaints so I do not think this is infectious in nature.    If this is negative I did discuss with the patient CT versus repeat abdominal exam in 24 hours. I do worry that given her young age and the numerous CT that she had that this ultimately is going to put her at risk for radiation-induced malignancies.  She has had 4 here in the last calendar year and reports numerous CTs outside of the [facility].  At this point patient's repeat exam showed no right lower quadrant tenderness, she has got a normal white count without a left shift and a normal CRP. She is afebrile with stable vital signs. As long as a repeat exam prior to DC is stable I feel that doing a 24-hour repeat and monitoring symptoms at home and returning to the ER sooner for any new or worsening symptoms is likely in the patient's best interest.  I discussed this with her and her fiancé and they agree. They do know that we have not definitively ruled out things like appendicitis or other potentially acute life-threatening injuries and they will have a low threshold to return to the ER for new or worsening symptoms. Please see Dr. Alan's ED course note for ultrasound results and the remainder of her ER course    I have reviewed the nursing notes.    I have reviewed the findings, diagnosis, plan and need for follow up with the patient.  This part of the document was transcribed by Cinda Almonte Medical Scribe.      New Prescriptions    No medications on file       Final diagnoses:   RLQ abdominal pain     I, Michael Almonte, am serving as a trained medical scribe to document  services personally performed by Giles Olivarez MD, based on the provider's statements to me.      I, Giles Olivarez MD, was physically present and have reviewed and verified the accuracy of this note documented by Michael Almonte.    4/18/2018   [facility], [place], EMERGENCY DEPARTMENT     Giles Olivarez MD  04/19/18 1035

## 2018-04-18 NOTE — ED AVS SNAPSHOT
[facility], Emergency Department    [address]    [place] [address]    Phone:  [phone]    Fax:  [phone]                                       Samara Oropeza   MRN: [id number]    Department:  [facility], Emergency Department   Date of Visit:  4/18/2018           Patient Information     Date Of Birth          1995        Your diagnoses for this visit were:     RLQ abdominal pain        You were seen by Giles Olivarez MD.        Discharge Instructions       TODAY'S VISIT:  You were seen today for abdominal pain.  Lab evaluation was normal and ultrasound negative for acute pelvic pathology.  No CT scan was obtained today so cannot rule out life-threatening pathology such as appendicitis though I think this is less likely based on normal labs, but if fever, worsening symptoms then please return to the ER.  Otherwise have a 24-hour follow-up with primary care provider.  -     FOLLOW-UP:  Please make an appointment to follow up with:  - Your Primary Care Provider in 24 hours for recheck.      PRESCRIPTIONS / MEDICATIONS:  -    OTHER INSTRUCTIONS:  -Continue symptom attic management at home.    RETURN TO THE EMERGENCY DEPARTMENT  Return to the Emergency Department at any time for new/worsening symptoms.       Your next 10 appointments already scheduled     Apr 30, 2018  8:00 PM CDT   PSG Split with BK BED 1   [facility] ([facility])    [address] [place] [address]   [phone]            May 01, 2018  7:30 AM CDT   LAB with BK LAB   [facility]    [address] [facility] [address]   [phone]           Please do not eat 10-12 hours before your appointment if you are coming in fasting for labs on lipids, cholesterol, or glucose (sugar). This does not apply to pregnant women. Water, hot tea and black coffee (with nothing added) are  okay. Do not drink other fluids, diet soda or chew gum.            May 01, 2018  8:00 AM CDT   Actigraphy Drop Off with SUJIT LU   [facility]    [address]  [facility] [address]   [phone]            May 01, 2018 10:45 AM CDT   [facility] with Cata Hurst NP   [facility]    [address]  [address]  [facility] [address]   [phone]            May 08, 2018  2:45 PM CDT   EP NEW with Korey David MD   [facility] ([facility])    [address] [place] [address] [address]  [facility] [address]   [phone] OPT 2            May 15, 2018 11:00 AM CDT   New Sleep Patient with Dashawn Colunga MD   [facility] ([facility]    [address]  [facility] [address]   [phone]            May 24, 2018  2:30 PM CDT   Return Sleep Patient with Kimo Gaston PsyD   [facility] ([facility]    [address]  [facility] [address]   [phone]            May 29, 2018  3:00 PM CDT   (Arrive by 2:45 PM)   Return Botox with Frederick Bender MD   [facility] ([facility])    [address] [id number] [phone]            Jul 17, 2018 10:30 AM CDT   Return Visit with Austen Marquez MD   [facility]    [address] [id number] [phone]            Aug 21, 2018 11:00 AM CDT   (Arrive by 10:45 AM)   Return Botox with Frederick Bender MD   [facility] ([facility])    [address] [facility] [address]  [address] [facility]  [place] [address] [phone]              24 Hour Appointment Hotline       To make an appointment at any [facility], call [phone] ([phone]). If you don't have a family doctor or clinic, we will help you find one. [facility] clinics are conveniently located to serve the needs of you and your family.             Review of your medicines      Our records show that you are taking the medicines listed below. If these are incorrect, please call your family doctor or clinic.        Dose / Directions Last dose taken    albuterol 108 (90 Base) MCG/ACT Inhaler   Commonly known as:  PROAIR HFA/PROVENTIL HFA/VENTOLIN HFA   Dose:  2 puff   Quantity:  1 Inhaler        Inhale 2 puffs into the lungs every 6 hours as needed for shortness of breath / dyspnea or wheezing   Refills:  1        amitriptyline 50 MG tablet   Commonly known as:  ELAVIL   Dose:  50 mg   Quantity:  30 tablet        Take 1 tablet (50 mg) by mouth At Bedtime   Refills:  11        apremilast 30 MG tablet   Commonly known as:  OTEZLA   Dose:  30 mg   Quantity:  180 tablet        Take 1 tablet (30 mg) by mouth 2 times daily   Refills:  0        ASPIRIN CHILDRENS 81 MG chewable tablet   Dose:  81 mg   Generic drug:  aspirin        Take 81 mg by mouth as needed   Refills:  0        aspirin-acetaminophen-caffeine 250-250-65 MG per tablet   Commonly known as:  EXCEDRIN MIGRAINE   Dose:  1 tablet        Take 1 tablet by mouth every 6 hours as needed for headaches   Refills:  0        benzocaine 20 % Gel   Commonly known as:  TOPICALE XTRA   Quantity:  30 g        Apply as needed locally to mouth or nasal ulcers for pain; 4 times daily as needed   Refills:  1        betamethasone valerate 0.1 % cream   Commonly known as:  VALISONE        Apply topically 2 times daily   Refills:  0        * BOTOX IJ   Dose:  165 Units        Inject 165 Units as directed once Lot#: /C3 Exp: 10/2020   Refills:  0        * BOTOX IJ   Dose:  165 Units        Inject 165  Units as directed once Lot # /C3  Exp:  10/2020   Refills:  0        * botulinum toxin type A 100 units injection   Commonly known as:  BOTOX   Dose:  200 Units   Quantity:  200 Units        Inject 200 Units into the muscle every 3 months   Refills:  3        * BOTOX IJ   Dose:  165 Units        Inject 165 Units into the muscle once Lot /C3 Exp 06/2020   Refills:  0        carbamide peroxide 6.5 % otic solution   Commonly known as:  DEBROX   Dose:  5 drop        5 drops 2 times daily   Refills:  0        clobetasol 0.05 % cream   Commonly known as:  TEMOVATE   Quantity:  60 g        Apply topically 2 times daily   Refills:  0        colchicine 0.6 MG tablet   Quantity:  45 tablet        0.6mg twice daily X 2 weeks and then 0.6mg daily X 2 weeks and then stop.   Refills:  0        COMPOUNDED NON-CONTROLLED SUBSTANCE - PHARMACY TO MIX COMPOUNDED MEDICATION   Commonly known as:  CMPD RX   Quantity:  30 capsule        Take one capsule in the morning   Refills:  0        cyproheptadine 4 MG tablet   Commonly known as:  PERIACTIN   Dose:  4 mg   Quantity:  30 tablet        Take 1 tablet (4 mg) by mouth At Bedtime For nightmares   Refills:  2        diclofenac 1 % Gel topical gel   Commonly known as:  VOLTAREN   Quantity:  100 g        Apply 2-4 grams to affected area(s) up to 4 times per day as needed. This is an anti-inflammatory medication.   Refills:  4        dicyclomine 20 MG tablet   Commonly known as:  BENTYL   Dose:  20 mg   Quantity:  120 tablet        Take 1 tablet (20 mg) by mouth 4 times daily as needed   Refills:  3        diltiazem 2% in PLO cream (FV COMPOUNDED) 2% Gel   Quantity:  30 g        Apply small pea size amount three times daily to anus until pain is gone.   Refills:  1        diphenhydrAMINE 25 MG tablet   Commonly known as:  BENADRYL   Dose:  25 mg   Quantity:  30 tablet        Take 1 tablet (25 mg) by mouth every 8 hours as needed for allergies   Refills:  0        EPINEPHrine 0.3  MG/0.3ML injection 2-pack   Commonly known as:  EPIPEN 2-EAMON   Dose:  0.3 mg   Quantity:  0.6 mL        Inject 0.3 mLs (0.3 mg) into the muscle as needed for anaphylaxis   Refills:  3        * guanFACINE 1 MG tablet   Commonly known as:  TENEX   Dose:  3 mg   Quantity:  270 tablet        Take 3 tablets (3 mg) by mouth 2 times daily   Refills:  3        * guanFACINE 1 MG tablet   Commonly known as:  TENEX   Quantity:  180 tablet        Take 3 tablets (3 mg) by mouth twice daily morning and evening.   Refills:  1        hydrOXYzine 25 MG tablet   Commonly known as:  ATARAX   Dose:  25-50 mg   Quantity:  90 tablet        Take 1-2 tablets (25-50 mg) by mouth every 6 hours as needed for anxiety   Refills:  2        hyoscyamine 0.125 MG tablet   Commonly known as:  ANASPAZ/LEVSIN   Dose:  0.125-0.25 mg   Quantity:  40 tablet        Take 1-2 tablets (125-250 mcg) by mouth every 4 hours as needed for cramping   Refills:  1        hypromellose 0.3 % Soln ophthalmic solution   Commonly known as:  GENTEAL   Dose:  1 drop        1 drop every hour as needed for dry eyes   Refills:  0        LACTAID PO        Take by mouth daily   Refills:  0        lactulose 20 GM/30ML Soln   Dose:  30 mL   Quantity:  300 mL        Take 30 mLs by mouth 3 times daily as needed   Refills:  3        lidocaine 2 % topical gel   Commonly known as:  XYLOCAINE   Dose:  1 Tube   Indication:  Anesthesia to a Specific Part of the Body        Apply 1 Tube topically daily Reported on 4/21/2017   Refills:  0        lidocaine visc 2% 2.5mL/5mL & maalox/mylanta w/ simeth 2.5mL/5mL & diphenhydrAMINE 5mg/5mL Susp suspension   Commonly known as:  MAGIC Mouthwash \[facility]\""   Dose:  10 mL   Quantity:  1 Bottle        Swish and swallow 10 mLs in mouth every 6 hours as needed for mouth sores   Refills:  1        linaclotide 145 MCG capsule   Commonly known as:  LINZESS   Dose:  145 mcg   Quantity:  90 capsule        Take 1 capsule (145 mcg) by mouth every morning  (before breakfast)   Refills:  1        LORazepam 1 MG tablet   Commonly known as:  ATIVAN   Dose:  0.5 mg   Quantity:  60 tablet        Take 0.5 tablets (0.5 mg) by mouth 2 times daily as needed for other (atypical chest pain) Do not operate a vehicle after taking this medication   Refills:  0        lubiprostone 24 MCG capsule   Commonly known as:  AMITIZA   Dose:  24 mcg   Quantity:  30 capsule        Take 1 capsule (24 mcg) by mouth 2 times daily (with meals)   Refills:  1        * norgestimate-ethinyl estradiol 0.25-35 MG-MCG per tablet   Commonly known as:  SPRINTEC 28   Dose:  1 tablet   Quantity:  84 tablet        Take 1 tablet by mouth daily   Refills:  4        * SPRINTEC 28 0.25-35 MG-MCG per tablet   Quantity:  84 tablet   Generic drug:  norgestimate-ethinyl estradiol        1 by mouth daily   Refills:  4        omeprazole 40 MG capsule   Commonly known as:  priLOSEC   Dose:  40 mg   Quantity:  90 capsule        Take 1 capsule (40 mg) by mouth daily   Refills:  3        ondansetron 8 MG tablet   Commonly known as:  ZOFRAN   Dose:  8 mg   Quantity:  60 tablet        Take 1 tablet (8 mg) by mouth every 8 hours as needed for nausea   Refills:  1        sucralfate 1 GM/10ML suspension   Commonly known as:  CARAFATE   Dose:  1 g   Quantity:  1200 mL        Take 10 mLs (1 g) by mouth 4 times daily   Refills:  2        triamcinolone 0.1 % cream   Commonly known as:  KENALOG   Quantity:  15 g        Apply sparingly to oral ulcers three times daily for 14 days as needed.   Refills:  1        * Notice:  This list has 8 medication(s) that are the same as other medications prescribed for you. Read the directions carefully, and ask your doctor or other care provider to review them with you.            Procedures and tests performed during your visit     CBC with platelets differential    CRP inflammation    Comprehensive metabolic panel    Lipase    Neisseria gonorrhoeae PCR    Peripheral IV catheter    UA with  Microscopic reflex to Culture    US Pelvis Cmplt w Transvag & Doppler LmtPel Duplex Limited    Wet prep    hCG qual urine POCT      Orders Needing Specimen Collection     None      Pending Results     Date and Time Order Name Status Description    4/18/2018 1832 Neisseria gonorrhoeae PCR In process     4/18/2018 1828 US Pelvis Cmplt w Transvag & Doppler LmtPel Duplex Limited Preliminary             Pending Culture Results     Date and Time Order Name Status Description    4/18/2018 1832 Neisseria gonorrhoeae PCR In process             Pending Results Instructions     If you had any lab results that were not finalized at the time of your Discharge, you can call the ED Lab Result RN at [phone]. You will be contacted by this team for any positive Lab results or changes in treatment. The nurses are available 7 days a week from 10A to 6:30P.  You can leave a message 24 hours per day and they will return your call.        Thank you for choosing [facility]       Thank you for choosing [facility] for your care. Our goal is always to provide you with excellent care. Hearing back from our patients is one way we can continue to improve our services. Please take a few minutes to complete the written survey that you may receive in the mail after you visit with us. Thank you!        [employer] Information     [employer] gives you secure access to your electronic health record. If you see a primary care provider, you can also send messages to your care team and make appointments. If you have questions, please call your primary care clinic.  If you do not have a primary care provider, please call [phone] and they will assist you.        Care EveryWhere ID     This is your Care EveryWhere ID. This could be used by other organizations to access your [facility] medical records  [id number]        Equal Access to Services     NABIL GALEANO : Brandon Santiago, channing hurtado, mike maloney  maddie rodriguez ah. So [facility] [phone].    ATENCIÓN: Si habla español, tiene a [place] disposición servicios gratuitos de asistencia lingüística. Llame al [phone].    We comply with applicable federal civil rights laws and [place] laws. We do not discriminate on the basis of race, color, national origin, age, disability, sex, sexual orientation, or gender identity.            After Visit Summary       This is your record. Keep this with you and show to your community pharmacist(s) and doctor(s) at your next visit.

## 2018-04-18 NOTE — TELEPHONE ENCOUNTER
Reason for call:  Symptom   Symptom or request: Pt states that she has been having abdominal pain since 6 am this morning, and is unable to eat or drink anything. She stated that she does not want to go to the ED, and would prefer to see Sonja. She said her stomach has lumps on it as well.     Duration (how long have symptoms been present): 8 hours  Have you been treated for this before? No    Additional comments:     Phone number to reach patient:  Home number on file [phone] (home)    Best Time:  Any    Can we leave a detailed message on this number?  YES

## 2018-04-18 NOTE — ED NOTES
Pt signed out to me by Dr. Cerda at 6:30pm      Situation: 21 yo female with Bechets disease who presented for worsening RLQ pain.  Labs negative. Pain is improved. No signs of acute abdomen.     Plan: Pending pelvic US; If negative d/c home.     Shift Report: US negative. Will d/c home.     Results for orders placed or performed during the hospital encounter of 04/18/18   US Pelvis Cmplt w Transvag & Doppler LmtPel Duplex Limited    Narrative    PELVIC ULTRASOUND WITH ENDOVAGINAL TRANSDUCER    4/18/2018 7:08 PM     HISTORY: Pelvic pain.     TECHNIQUE:  Endovaginal sonography was added to the transabdominal  exam to better evaluate the uterus and ovaries.    COMPARISON: None.    FINDINGS: Uterus normal in size measuring 6.7 x 3.4 x 3.4 cm. The  endometrium is normal thickness at 0.4 cm. Left ovary is not seen. The  right ovary is normal in size and appearance. Color Doppler and  Doppler waveform analysis of the right ovary shows arterial and venous  blood flow. No adnexal mass. No free pelvic fluid.      Impression    IMPRESSION: Normal pelvis. The left ovary is not seen.      CHRISSY CASTILLO MD   UA with Microscopic reflex to Culture   Result Value Ref Range    Color Urine Yellow     Appearance Urine Clear     Glucose Urine Negative NEG^Negative mg/dL    Bilirubin Urine Negative NEG^Negative    Ketones Urine 10 (A) NEG^Negative mg/dL    Specific Gravity Urine 1.019 1.003 - 1.035    Blood Urine Negative NEG^Negative    pH Urine 5.0 5.0 - 7.0 pH    Protein Albumin Urine Negative NEG^Negative mg/dL    Urobilinogen mg/dL Normal 0.0 - 2.0 mg/dL    Nitrite Urine Negative NEG^Negative    Leukocyte Esterase Urine Negative NEG^Negative    Source Midstream Urine     WBC Urine 1 0 - 5 /HPF    RBC Urine 1 0 - 2 /HPF    Squamous Epithelial /HPF Urine <1 0 - 1 /HPF    Mucous Urine Present (A) NEG^Negative /LPF    Hyaline Casts 1 0 - 2 /LPF   CBC with platelets differential   Result Value Ref Range    WBC 5.0 4.0 - 11.0 10e9/L     RBC Count 4.19 3.8 - 5.2 10e12/L    Hemoglobin 11.7 11.7 - 15.7 g/dL    Hematocrit 36.6 35.0 - 47.0 %    MCV 87 78 - 100 fl    MCH 27.9 26.5 - 33.0 pg    MCHC 32.0 31.5 - 36.5 g/dL    RDW 14.2 10.0 - 15.0 %    Platelet Count 215 150 - 450 10e9/L    Diff Method Automated Method     % Neutrophils 43.4 %    % Lymphocytes 48.3 %    % Monocytes 5.9 %    % Eosinophils 2.2 %    % Basophils 0.2 %    % Immature Granulocytes 0.0 %    Nucleated RBCs 0 0 /100    Absolute Neutrophil 2.2 1.6 - 8.3 10e9/L    Absolute Lymphocytes 2.4 0.8 - 5.3 10e9/L    Absolute Monocytes 0.3 0.0 - 1.3 10e9/L    Absolute Eosinophils 0.1 0.0 - 0.7 10e9/L    Absolute Basophils 0.0 0.0 - 0.2 10e9/L    Abs Immature Granulocytes 0.0 0 - 0.4 10e9/L    Absolute Nucleated RBC 0.0    Comprehensive metabolic panel   Result Value Ref Range    Sodium 142 133 - 144 mmol/L    Potassium 3.9 3.4 - 5.3 mmol/L    Chloride 108 94 - 109 mmol/L    Carbon Dioxide 24 20 - 32 mmol/L    Anion Gap 10 3 - 14 mmol/L    Glucose 70 70 - 99 mg/dL    Urea Nitrogen 11 7 - 30 mg/dL    Creatinine 0.77 0.52 - 1.04 mg/dL    GFR Estimate >90 >60 mL/min/1.7m2    GFR Estimate If Black >90 >60 mL/min/1.7m2    Calcium 9.0 8.5 - 10.1 mg/dL    Bilirubin Total 0.5 0.2 - 1.3 mg/dL    Albumin 4.0 3.4 - 5.0 g/dL    Protein Total 7.6 6.8 - 8.8 g/dL    Alkaline Phosphatase 81 40 - 150 U/L    ALT 28 0 - 50 U/L    AST 26 0 - 45 U/L   Lipase   Result Value Ref Range    Lipase 101 73 - 393 U/L   CRP inflammation   Result Value Ref Range    CRP Inflammation <2.9 0.0 - 8.0 mg/L   hCG qual urine POCT   Result Value Ref Range    HCG Qual Urine Negative neg    Internal QC OK Yes    Wet prep   Result Value Ref Range    Specimen Description Vagina     Wet Prep No yeast seen     Wet Prep Rare  PMNs seen       Wet Prep No motile Trichomonas seen     Wet Prep Rare  Clue cells seen   (A)      Medications   0.9% sodium chloride BOLUS (0 mLs Intravenous Stopped 4/18/18 1900)          Saida Alan,  MD  04/18/18 7337

## 2018-04-18 NOTE — DISCHARGE INSTRUCTIONS
TODAY'S VISIT:  You were seen today for abdominal pain.  Lab evaluation was normal and ultrasound negative for acute pelvic pathology.  No CT scan was obtained today so cannot rule out life-threatening pathology such as appendicitis though I think this is less likely based on normal labs, but if fever, worsening symptoms then please return to the ER.  Otherwise have a 24-hour follow-up with primary care provider.  -     FOLLOW-UP:  Please make an appointment to follow up with:  - Your Primary Care Provider in 24 hours for recheck.      PRESCRIPTIONS / MEDICATIONS:  -    OTHER INSTRUCTIONS:  -Continue symptom attic management at home.    RETURN TO THE EMERGENCY DEPARTMENT  Return to the Emergency Department at any time for new/worsening symptoms.

## 2018-04-18 NOTE — ED AVS SNAPSHOT
[facility], [place], Emergency Department    [address]    [facility] [address]    Phone:  [phone]    Fax:  [phone]                                       Samara Oropeza   MRN: [id number]    Department:  [facility], Emergency Department   Date of Visit:  4/18/2018           After Visit Summary Signature Page     I have received my discharge instructions, and my questions have been answered. I have discussed any challenges I see with this plan with the nurse or doctor.    ..........................................................................................................................................  Patient/Patient Representative Signature      ..........................................................................................................................................  Patient Representative Print Name and Relationship to Patient    ..................................................               ................................................  Date                                            Time    ..........................................................................................................................................  Reviewed by Signature/Title    ...................................................              ..............................................  Date                                                            Time

## 2018-04-18 NOTE — TELEPHONE ENCOUNTER
Routing to provider - Brady - please review and advise as appropriate  1. Symptoms:  Abdominal pain  2. Requesting provider input    Thank you,  Wes Locke RN

## 2018-04-19 LAB
N GONORRHOEA DNA SPEC QL NAA+PROBE: NEGATIVE
SPECIMEN SOURCE: NORMAL

## 2018-04-23 NOTE — PROGRESS NOTES
SUBJECTIVE:   Samara Oropeza is a 22 year old female who presents to clinic today for the following health issues:    ED/UC Followup:    Facility:  [facility]  Date of visit: 04/18/2018  Reason for visit: bad stomach pain.   Current Status: normal, stomach is still painful. Denies any other symptoms and signs.      Lower abdominal pain has now improved, not nearly as bad as it was a few days ago. Pain was in RLQ, but now is more suprapubic and LLQ. She was diagnosed with BV in ER but not given treatment. Not experiencing vaginal discharge. Has a known cyst on Left ovary.  Was recently diagnosed at [facility] with Pelvic floor weakness, which is thought to be contributing to her overall abdominal pain and difficulty with digestion. She was told to complete an intensive abdominal pain specific physical therapy program at [facility], but she is not sure how she could complete that with living here, and how she could pay for lodging etc at [facility] during that physical therapy. She plans to call [facility] again to see if they would accept another sort of physical therapy from a local provider instead. After she completes physical therapy, terrell said they will take her on as a patient for her chronic abdominal pain.  Rheumatology plans to take her off colchicine over the next five weeks, as they are thinking this may be contributing to some of her abdominal pain issues due to an interaction with Colchicine and Otezla    -------------------------------------    Problem list and histories reviewed & adjusted, as indicated.  Additional history: as documented    Patient Active Problem List   Diagnosis     Tics - Tourette syndrome     IBS (irritable bowel syndrome)     Seasonal allergic rhinitis     TAHIR (generalized anxiety disorder)     Knee pain     Concussion     Milk protein intolerance     Headaches due to old head injury     Behcet's disease (H)     Migraine     Spell of shaking     On colchicine therapy     Palpitations      Fibromyalgia     Rheumatoid arthritis of multiple sites without rheumatoid factor (H)     Raynaud's disease without gangrene     Chronic abdominal pain     Patellofemoral instability     PTSD (post-traumatic stress disorder)     Cervical dystonia     Acute left ankle pain     Cervical pain     Major depressive disorder, recurrent episode, moderate (H)     Chronic pain syndrome     Convulsions, unspecified convulsion type (H)     Transient alteration of awareness     Vitamin D deficiency     Mobile cecum     Spells of decreased attentiveness     Pelvic floor weakness     Past Surgical History:   Procedure Laterality Date     ARTHROSCOPY KNEE WITH PATELLAR REALIGNMENT  7/25/2013    Procedure: ARTHROSCOPY KNEE WITH PATELLAR REALIGNMENT;  Left Knee Arthroscopy, Medial Patellofemoral Ligament Reconstruction with Allograft  ;  Surgeon: Jennifer Acevedo MD;  [facility]: [facility]     COLONOSCOPY  2015     DENTAL SURGERY  1996    Teeth removal     ENDOSCOPY UPPER, COLONOSCOPY, COMBINED  2005      ESOPH/GAS REFLUX TEST W NASAL IMPED >1 HR N/A 2/15/2017    Procedure: ESOPHAGEAL IMPEDENCE FUNCTION TEST WITH 24 HOUR PH GREATER THAN 1 HOUR;  Surgeon: Timothy Matta MD;  Location:  GI       Social History   Substance Use Topics     Smoking status: Never Smoker     Smokeless tobacco: Never Used     Alcohol use 0.6 oz/week     1 Standard drinks or equivalent per week      Comment: rarely     Family History   Problem Relation Age of Onset     Depression Mother      Neurologic Disorder Mother      Migraines, take imitrex injection.  Also in maternal grandmother.       Alcohol/Drug Father      Hypertension Father      Depression Father      Cardiovascular Maternal Grandmother      Depression Maternal Grandmother      Hypertension Maternal Grandmother      Alzheimer Disease Maternal Grandmother      Cardiovascular Maternal Grandfather      Hypertension Maternal Grandfather      Depression Maternal Grandfather      Alcohol/Drug  Maternal Grandfather      Cardiovascular Paternal Grandmother      Hypertension Paternal Grandmother      Cardiovascular Paternal Grandfather      Hypertension Paternal Grandfather      Glaucoma No family hx of      Macular Degeneration No family hx of            Reviewed and updated as needed this visit by clinical staff  Tobacco  Allergies  Meds  Med Hx  Surg Hx  Fam Hx  Soc Hx      Reviewed and updated as needed this visit by Provider         ROS:  Constitutional, HEENT, cardiovascular, pulmonary, gi and gu systems are negative, except as otherwise noted.    OBJECTIVE:     /72  Pulse 95  Temp 97.6  F (36.4  C) (Oral)  LMP 03/27/2018 (Exact Date)  SpO2 99%  There is no height or weight on file to calculate BMI.   GENERAL: healthy, alert and no distress  NECK: no adenopathy, no asymmetry, masses, or scars and thyroid normal to palpation  RESP: lungs clear to auscultation - no rales, rhonchi or wheezes  CV: regular rate and rhythm, normal S1 S2, no S3 or S4, no murmur, click or rub, no peripheral edema and peripheral pulses strong  ABDOMEN: tenderness suprapubic and LLQ and bowel sounds normal  MS: no gross musculoskeletal defects noted, no edema    Diagnostic Test Results:  No results found for this or any previous visit (from the past 24 hour(s)).    ASSESSMENT/PLAN:     Problem List Items Addressed This Visit     Pelvic floor weakness    Chronic abdominal pain      Other Visit Diagnoses     Bacterial vaginosis    -  Primary    Relevant Medications    metroNIDAZOLE (METROGEL) 0.75 % vaginal gel       EVI Cardona CNP  [facility]  Please note greater than 50% of this 30 minute appointment were spent in face to face counseling with the patient of the issues described above in the history of present illness and in the plan, including consolidating history

## 2018-04-24 ENCOUNTER — OFFICE VISIT (OUTPATIENT)
Dept: FAMILY MEDICINE | Facility: CLINIC | Age: 23
End: 2018-04-24
Payer: COMMERCIAL

## 2018-04-24 VITALS
TEMPERATURE: 97.6 F | OXYGEN SATURATION: 99 % | DIASTOLIC BLOOD PRESSURE: 72 MMHG | HEART RATE: 95 BPM | SYSTOLIC BLOOD PRESSURE: 110 MMHG

## 2018-04-24 DIAGNOSIS — R10.9 CHRONIC ABDOMINAL PAIN: ICD-10-CM

## 2018-04-24 DIAGNOSIS — B96.89 BACTERIAL VAGINOSIS: Primary | ICD-10-CM

## 2018-04-24 DIAGNOSIS — N76.0 BACTERIAL VAGINOSIS: Primary | ICD-10-CM

## 2018-04-24 DIAGNOSIS — G89.29 CHRONIC ABDOMINAL PAIN: ICD-10-CM

## 2018-04-24 DIAGNOSIS — N81.89 PELVIC FLOOR WEAKNESS: ICD-10-CM

## 2018-04-24 PROCEDURE — 99214 OFFICE O/P EST MOD 30 MIN: CPT | Performed by: NURSE PRACTITIONER

## 2018-04-24 RX ORDER — METRONIDAZOLE 7.5 MG/G
1 GEL VAGINAL AT BEDTIME
Qty: 70 G | Refills: 0 | Status: SHIPPED | OUTPATIENT
Start: 2018-04-24 | End: 2018-04-29

## 2018-04-24 NOTE — MR AVS SNAPSHOT
After Visit Summary   4/24/2018    Samara Oropeza    MRN: [id number]           Patient Information     Date Of Birth          1995        Visit Information        Provider Department      4/24/2018 2:30 PM Sonja Abreu APRN [facility]        Today's Diagnoses     Bacterial vaginosis    -  1       Follow-ups after your visit        Your next 10 appointments already scheduled     Apr 30, 2018  8:00 PM CDT   PSG Split with BK BED 1   [facility] ([facility])    [address]  [place] [address]   [phone]            May 01, 2018  7:30 AM CDT   [facility]K [facility]    [address] [address]   [phone]           Please do not eat 10-12 hours before your appointment if you are coming in fasting for labs on lipids, cholesterol, or glucose (sugar). This does not apply to pregnant women. Water, hot tea and black coffee (with nothing added) are okay. Do not drink other fluids, diet soda or chew gum.            May 01, 2018  8:00 AM CDT   Actigraphy Drop Off with BK SC DME   [facility] ([facility])    [address]  [place] [address]   [phone]            May 01, 2018 10:45 AM CDT   [facility] Visit with Cata Hurst NP   [facility]    [address]  [address]  [facility] [address] [phone]            May 08, 2018  2:45 PM CDT   EP NEW with Korey David MD   [facility]   [place] [facility]    [address]  [facility] [place] [facility] [address] [phone] OPT 2            May 15, 2018 11:00 AM CDT   New Sleep Patient with Dashawn Colunga MD   [facility] ([facility])    [address]  [place] [address]   [phone]            May 18, 2018 11:00 AM CDT   (Arrive by 10:30 AM)   New Visit with Ernestina Patel [facility]   [facility] ([facility])    [facility]  [address]  [facility] [address]   [phone]            May 24, 2018  2:30 PM CDT   Return Sleep Patient with Kimo Gaston PsyD   [facility] ([facility]    [address]  [facility] [address]   [phone]            May 29, 2018  3:00 PM CDT   (Arrive by 2:45 PM)   Return Botox with Frederick Bender MD   [facility] ([facility])    [address] [facility] [address] [phone]            Jul 17, 2018 10:30 AM CDT   Return Visit with Austen Marquez MD   [facility]    [address] [address] [phone]              Who to contact     If you have questions or need follow up information about today's clinic visit or your schedule please contact [facility] directly at [phone].  Normal or non-critical lab and imaging results will be communicated to you by MyChart, letter or phone within 4 business days after the clinic has received the results. If you do not hear from us within 7 days, please contact the clinic through MyChart or phone. If you have a critical or abnormal lab result, we will notify you by phone as soon as possible.  Submit refill requests through [employer] or call your pharmacy and they will forward the refill request to us. Please allow 3 business days for your refill to be completed.          Additional Information About Your Visit        [employer] Information     [employer] gives you secure access to your electronic health  record. If you see a primary care provider, you can also send messages to your care team and make appointments. If you have questions, please call your primary care clinic.  If you do not have a primary care provider, please call [phone] and they will assist you.        Care EveryWhere ID     This is your Care EveryWhere ID. This could be used by other organizations to access your [place] medical records  [id number]        Your Vitals Were     Pulse Temperature Last Period Pulse Oximetry          95 97.6  F (36.4  C) (Oral) 03/27/2018 (Exact Date) 99%         Blood Pressure from Last 3 Encounters:   04/24/18 110/72   04/18/18 122/79   04/17/18 104/70    Weight from Last 3 Encounters:   04/18/18 147 lb (66.7 kg)   04/17/18 147 lb (66.7 kg)   04/16/18 147 lb (66.7 kg)              Today, you had the following     No orders found for display         Today's Medication Changes          These changes are accurate as of 4/24/18  2:52 PM.  If you have any questions, ask your nurse or doctor.               Start taking these medicines.        Dose/Directions    metroNIDAZOLE 0.75 % vaginal gel   Commonly known as:  METROGEL   Used for:  Bacterial vaginosis   Started by:  Sonja Abreu APRN CNP        Dose:  1 applicator   Place 1 applicator (5 g) vaginally At Bedtime for 5 days   Quantity:  70 g   Refills:  0            Where to get your medicines      These medications were [facility] to [facility] [address] [address]     Phone:  [phone]     metroNIDAZOLE 0.75 % vaginal gel                Primary Care Provider Office Phone # Fax #    EVI Cardona [phone]       [address] [facility] [address]  [facility] [address]        Equal Access to Services     NABIL GALEANO AH: Brandon Santiago, wacarmen luqadaha, qaybta kaalmamariya juan, mike parsons. So [facility]  [phone].    ATENCIÓN: Si henry nuñez, tiene a livingston disposición servicios gratuitos de asistencia lingüística. Jesus bullard [phone].    We comply with applicable federal civil rights laws and [place] laws. We do not discriminate on the basis of race, color, national origin, age, disability, sex, sexual orientation, or gender identity.            Thank you!     Thank you for choosing [facility]  for your care. Our goal is always to provide you with excellent care. Hearing back from our patients is one way we can continue to improve our services. Please take a few minutes to complete the written survey that you may receive in the mail after your visit with us. Thank you!             Your Updated Medication List - Protect others around you: Learn how to safely use, store and throw away your medicines at www.disposemymeds.org.          This list is accurate as of 4/24/18  2:52 PM.  Always use your most recent med list.                   Brand Name Dispense Instructions for use Diagnosis    albuterol 108 (90 Base) MCG/ACT Inhaler    PROAIR HFA/PROVENTIL HFA/VENTOLIN HFA    1 Inhaler    Inhale 2 puffs into the lungs every 6 hours as needed for shortness of breath / dyspnea or wheezing    Atypical chest pain       amitriptyline 50 MG tablet    ELAVIL    30 tablet    Take 1 tablet (50 mg) by mouth At Bedtime    Abdominal pain, generalized, Insomnia due to medical condition       apremilast 30 MG tablet    OTEZLA    180 tablet    Take 1 tablet (30 mg) by mouth 2 times daily    Behcet's disease (H)       ASPIRIN CHILDRENS 81 MG chewable tablet   Generic drug:  aspirin      Take 81 mg by mouth as needed        aspirin-acetaminophen-caffeine 250-250-65 MG per tablet    EXCEDRIN MIGRAINE     Take 1 tablet by mouth every 6 hours as needed for headaches        benzocaine 20 % Gel    TOPICALE XTRA    30 g    Apply as needed locally to mouth or nasal ulcers for pain; 4 times daily as needed    Behcet's disease  (H)       betamethasone valerate 0.1 % cream    VALISONE     Apply topically 2 times daily        * BOTOX IJ      Inject 165 Units as directed once Lot#: /C3 Exp: 10/2020        * BOTOX IJ      Inject 165 Units as directed once Lot # /C3  Exp:  10/2020        * botulinum toxin type A 100 units injection    BOTOX    200 Units    Inject 200 Units into the muscle every 3 months    Cervical dystonia       * BOTOX IJ      Inject 165 Units into the muscle once Lot /C3 Exp 06/2020        carbamide peroxide 6.5 % otic solution    DEBROX     5 drops 2 times daily        clobetasol 0.05 % cream    TEMOVATE    60 g    Apply topically 2 times daily    Folliculitis       colchicine 0.6 MG tablet     45 tablet    0.6mg twice daily X 2 weeks and then 0.6mg daily X 2 weeks and then stop.    Behcet's disease (H)       COMPOUNDED NON-CONTROLLED SUBSTANCE - PHARMACY TO MIX COMPOUNDED MEDICATION    CMPD RX    30 capsule    Take one capsule in the morning    Fibromyalgia       cyproheptadine 4 MG tablet    PERIACTIN    30 tablet    Take 1 tablet (4 mg) by mouth At Bedtime For nightmares    Nightmares       diclofenac 1 % Gel topical gel    VOLTAREN    100 g    Apply 2-4 grams to affected area(s) up to 4 times per day as needed. This is an anti-inflammatory medication.    Polyarthralgia       dicyclomine 20 MG tablet    BENTYL    120 tablet    Take 1 tablet (20 mg) by mouth 4 times daily as needed    Abdominal cramping       diltiazem 2% in PLO cream (FV COMPOUNDED) 2% Gel     30 g    Apply small pea size amount three times daily to anus until pain is gone.    Anal fissure       diphenhydrAMINE 25 MG tablet    BENADRYL    30 tablet    Take 1 tablet (25 mg) by mouth every 8 hours as needed for allergies        EPINEPHrine 0.3 MG/0.3ML injection 2-pack    EPIPEN 2-EAMON    0.6 mL    Inject 0.3 mLs (0.3 mg) into the muscle as needed for anaphylaxis    Hx of bee sting allergy       * guanFACINE 1 MG tablet    TENEX    270 tablet     Take 3 tablets (3 mg) by mouth 2 times daily    Tic       * guanFACINE 1 MG tablet    TENEX    180 tablet    Take 3 tablets (3 mg) by mouth twice daily morning and evening.    Tic       hydrOXYzine 25 MG tablet    ATARAX    90 tablet    Take 1-2 tablets (25-50 mg) by mouth every 6 hours as needed for anxiety    TAHIR (generalized anxiety disorder)       hyoscyamine 0.125 MG tablet    ANASPAZ/LEVSIN    40 tablet    Take 1-2 tablets (125-250 mcg) by mouth every 4 hours as needed for cramping    Abdominal pain, generalized       hypromellose 0.3 % Soln ophthalmic solution    GENTEAL     1 drop every hour as needed for dry eyes        LACTAID PO      Take by mouth daily        lactulose 20 GM/30ML Soln     300 mL    Take 30 mLs by mouth 3 times daily as needed    Constipation, unspecified constipation type       lidocaine 2 % topical gel    XYLOCAINE     Apply 1 Tube topically daily Reported on 4/21/2017        lidocaine visc 2% 2.5mL/5mL & maalox/mylanta w/ simeth 2.5mL/5mL & diphenhydrAMINE 5mg/5mL Susp suspension    [facility]    1 Bottle    Swish and swallow 10 mLs in mouth every 6 hours as needed for mouth sores    Behcet's syndrome (H)       linaclotide 145 MCG capsule    LINZESS    90 capsule    Take 1 capsule (145 mcg) by mouth every morning (before breakfast)    Chronic idiopathic constipation       LORazepam 1 MG tablet    ATIVAN    60 tablet    Take 0.5 tablets (0.5 mg) by mouth 2 times daily as needed for other (atypical chest pain) Do not operate a vehicle after taking this medication    Chronic abdominal pain       lubiprostone 24 MCG capsule    AMITIZA    30 capsule    Take 1 capsule (24 mcg) by mouth 2 times daily (with meals)    Chronic idiopathic constipation       metroNIDAZOLE 0.75 % vaginal gel    METROGEL    70 g    Place 1 applicator (5 g) vaginally At Bedtime for 5 days    Bacterial vaginosis       * norgestimate-ethinyl estradiol 0.25-35 MG-MCG per tablet    SPRINTEC 28    52  tablet    Take 1 tablet by mouth daily    General counseling for prescription of oral contraceptives       * SPRINTEC 28 0.25-35 MG-MCG per tablet   Generic drug:  norgestimate-ethinyl estradiol     84 tablet    1 by mouth daily    General counseling for prescription of oral contraceptives       omeprazole 40 MG capsule    priLOSEC    90 capsule    Take 1 capsule (40 mg) by mouth daily    Gastroesophageal reflux disease, esophagitis presence not specified       ondansetron 8 MG tablet    ZOFRAN    60 tablet    Take 1 tablet (8 mg) by mouth every 8 hours as needed for nausea    Nausea       sucralfate 1 GM/10ML suspension    CARAFATE    1200 mL    Take 10 mLs (1 g) by mouth 4 times daily    Bile reflux gastritis, Nausea       triamcinolone 0.1 % cream    KENALOG    15 g    Apply sparingly to oral ulcers three times daily for 14 days as needed.    Behcet's syndrome (H)       * Notice:  This list has 8 medication(s) that are the same as other medications prescribed for you. Read the directions carefully, and ask your doctor or other care provider to review them with you.

## 2018-04-25 PROBLEM — N81.89 PELVIC FLOOR WEAKNESS: Status: ACTIVE | Noted: 2018-04-25

## 2018-04-28 ENCOUNTER — OFFICE VISIT (OUTPATIENT)
Dept: OPHTHALMOLOGY | Facility: CLINIC | Age: 23
End: 2018-04-28
Payer: COMMERCIAL

## 2018-04-28 DIAGNOSIS — H04.123 DRY EYES, BILATERAL: Primary | ICD-10-CM

## 2018-04-28 ASSESSMENT — VISUAL ACUITY
METHOD: SNELLEN - LINEAR
OD_SC: 20/20
OS_SC: 20/20
OD_SC+: 1
OS_SC+: -2

## 2018-04-28 ASSESSMENT — TONOMETRY
IOP_METHOD: TONOPEN
OD_IOP_MMHG: 16
OS_IOP_MMHG: 14

## 2018-04-28 ASSESSMENT — EXTERNAL EXAM - LEFT EYE: OS_EXAM: NORMAL

## 2018-04-28 ASSESSMENT — CONF VISUAL FIELD
OS_NORMAL: 1
OD_NORMAL: 1

## 2018-04-28 ASSESSMENT — CUP TO DISC RATIO
OS_RATIO: 0.35
OD_RATIO: 0.35

## 2018-04-28 ASSESSMENT — SLIT LAMP EXAM - LIDS
COMMENTS: NORMAL
COMMENTS: NORMAL

## 2018-04-28 ASSESSMENT — EXTERNAL EXAM - RIGHT EYE: OD_EXAM: NORMAL

## 2018-04-28 NOTE — PROGRESS NOTES
Cc: F/u for Behcet's.     Samara Oropeza is a 19 year old female here with complaints of left eye pain. Patient reports Foreign body like sensation in the left eye that is particularly worse in the morning and slowly improves throughout the day. Patient reports some photophobia and mild blurring. Denies redness, discharge. Reports that she has not been able to take her Behcet's medications as directly because of nausea for the past two weeks.    Famhx: unknown    Med History:  Behcet's, diagnosed in 2014. On colchicine and working with Rheumatology.     POHx: glasses    Assessment and Plan:  1. Dry eyes, OS worse than OD   Significant inf. PEEs   Patient's mom reports that patient sleeps with eyelids partially open   Foreign body like sensation in the morning that gradually improves throughout the day   Start on PFAT q 1 hour as needed, AT ointment at bedtime    2. Behcets' disease   Both eyes are quiet, no inflammation, visual acuity is good (20/20- both eyes)   Advised patient to follow up with rheumatology to help resolve the nausea related to her Behcet's medications   Discussed signs, symptoms of vision loss related to Behcet's associated uveitis and advised urgent follow up should she experience these symptoms - patient and family state understanding    F/u: 1 month for surface check and refraction, or sooner if needed    Haja Victor MD  PGY-2 Ophthalmology  [phone]    Not seen by staff during this visit, available should need have arisen.  Plan appropriate as above.    Christos Bosch MD  , Comprehensive Ophthalmology  Department of Ophthalmology and Visual Neurosciences  [facility]

## 2018-04-28 NOTE — MR AVS SNAPSHOT
After Visit Summary   4/28/2018    Samara Oropeza    MRN: [id number]           Patient Information     Date Of Birth          1995        Visit Information        Provider Department      4/28/2018 10:50 AM Haja Victor MD [facility]        Today's Diagnoses     Dry eyes, bilateral    -  1       Follow-ups after your visit        Follow-up notes from your care team     Return in about 1 month (around 5/28/2018) for Refraction, surface check.      Your next 10 appointments already scheduled     May 15, 2018 11:00 AM CDT   New Sleep Patient with Dashawn Colunga MD   [facility] [place] [facility]    [address]  [facility] [address]   [phone]            May 18, 2018 11:00 AM CDT   (Arrive by 10:30 AM)   New Visit with Ernestina Patel [facility] ([place] [id number]  [facility] [address]   [phone]            May 24, 2018  2:30 PM CDT   Return Sleep Patient with Kimo Gaston PsyD   [facility] [place] [facility] [place]    [address]  [facility] [address]   [phone]            May 29, 2018  3:00 PM CDT   (Arrive by 2:45 PM)   Return Botox with Frederick Bender MD   [facility] ([facility])    [address] [facility]  3rd [facility] [address] [phone]            May 31, 2018  3:15 PM CDT   RETURN RETINA with Umer Heaton MD   [facility] ([facility])    [place] [address] [facility]  9th Fl Clin 9a  [facility] [address]   [phone]            Jun 13, 2018  3:15 PM CDT   Return Visit with Cata Hurst NP   [facility] ([facility])    [address]  [address]  [facility]  [address]   [phone]            Jul 17, 2018 10:30 AM CDT   [facility]    [address] [address] [phone]            Aug 21, 2018 11:00 AM CDT   (Arrive by 10:45 AM)   Return Botox with Frederick Bender MD   [facility] ([facility])    [address] [place] [address]  3rd [facility] [address] [phone]              Who to contact     Please call your clinic at [phone] to:    Ask questions about your health    Make or cancel appointments    Discuss your medicines    Learn about your test results    Speak to your doctor            Additional Information About Your Visit        New [employer] V&V Information     [employer] gives you secure access to your electronic health record. If you see a primary care provider, you can also send messages to your care team and make appointments. If you have questions, please call your primary care clinic.  If you do not have a primary care provider, please call [phone] and they will assist you.      [employer] [address] [employer] is an electronic gateway that provides easy, online access to your medical records. With [employer] [address] [employer], you can request a clinic appointment, read your test results, renew a prescription or communicate with your care team.     To access your existing account, please contact your [facility] or call [phone] for assistance.        Care EveryWhere ID     This is your Care EveryWhere ID. This could be used by other organizations to access your [facility] medical records  [id number]         Blood Pressure from Last 3 Encounters:   05/08/18 110/80   04/24/18 110/72   04/18/18 122/79    Weight from Last 3 Encounters:   05/08/18 67 kg (147 lb 9.6 oz)   04/18/18 66.7 kg (147 lb)   04/17/18 66.7 kg (147 lb)              Today, you had the following      No orders found for display       Primary Care Provider Office Phone # Fax #    Sonja Abreu, EVI [phone]       [address] [facility] [address]  [facility] [address]        Equal Access to Services     NABIL GALEANO : Hadii aad ku hadprimoo Soomaali, waaxda luqadaha, qaybta kaalmada adeegyada, waxjarred idiin haysophian adedavid crane jennifer . So [facility] [phone].    ATENCIÓN: Si habla español, tiene a livingston disposición servicios gratuitos de asistencia lingüística. Llame al [phone].    We comply with applicable federal civil rights laws and [place] laws. We do not discriminate on the basis of race, color, national origin, age, disability, sex, sexual orientation, or gender identity.            Thank you!     Thank you for choosing EYE CLINIC  for your care. Our goal is always to provide you with excellent care. Hearing back from our patients is one way we can continue to improve our services. Please take a few minutes to complete the written survey that you may receive in the mail after your visit with us. Thank you!             Your Updated Medication List - Protect others around you: Learn how to safely use, store and throw away your medicines at www.disposemymeds.org.          This list is accurate as of 4/28/18 11:59 PM.  Always use your most recent med list.                   Brand Name Dispense Instructions for use Diagnosis    albuterol 108 (90 Base) MCG/ACT Inhaler    PROAIR HFA/PROVENTIL HFA/VENTOLIN HFA    1 Inhaler    Inhale 2 puffs into the lungs every 6 hours as needed for shortness of breath / dyspnea or wheezing    Atypical chest pain       amitriptyline 50 MG tablet    ELAVIL    30 tablet    Take 1 tablet (50 mg) by mouth At Bedtime    Abdominal pain, generalized, Insomnia due to medical condition       ASPIRIN CHILDRENS 81 MG chewable tablet   Generic drug:  aspirin      Take 81 mg by mouth as needed        aspirin-acetaminophen-caffeine 250-250-65 MG per tablet    EXCEDRIN MIGRAINE      Take 1 tablet by mouth every 6 hours as needed for headaches        benzocaine 20 % Gel    TOPICALE XTRA    30 g    Apply as needed locally to mouth or nasal ulcers for pain; 4 times daily as needed    Behcet's disease (H)       betamethasone valerate 0.1 % cream    VALISONE     Apply topically 2 times daily        * BOTOX IJ      Inject 165 Units as directed once Lot#: /C3 Exp: 10/2020        * BOTOX IJ      Inject 165 Units as directed once Lot # /C3  Exp:  10/2020        * botulinum toxin type A 100 units injection    BOTOX    200 Units    Inject 200 Units into the muscle every 3 months    Cervical dystonia       * BOTOX IJ      Inject 165 Units into the muscle once Lot /C3 Exp 06/2020        carbamide peroxide 6.5 % otic solution    DEBROX     5 drops 2 times daily        clobetasol 0.05 % cream    TEMOVATE    60 g    Apply topically 2 times daily    Folliculitis       COMPOUNDED NON-CONTROLLED SUBSTANCE - PHARMACY TO MIX COMPOUNDED MEDICATION    CMPD RX    30 capsule    Take one capsule in the morning    Fibromyalgia       cyproheptadine 4 MG tablet    PERIACTIN    30 tablet    Take 1 tablet (4 mg) by mouth At Bedtime For nightmares    Nightmares       diclofenac 1 % Gel topical gel    VOLTAREN    100 g    Apply 2-4 grams to affected area(s) up to 4 times per day as needed. This is an anti-inflammatory medication.    Polyarthralgia       dicyclomine 20 MG tablet    BENTYL    120 tablet    Take 1 tablet (20 mg) by mouth 4 times daily as needed    Abdominal cramping       diltiazem 2% in PLO cream (FV COMPOUNDED) 2% Gel     30 g    Apply small pea size amount three times daily to anus until pain is gone.    Anal fissure       diphenhydrAMINE 25 MG tablet    BENADRYL    30 tablet    Take 1 tablet (25 mg) by mouth every 8 hours as needed for allergies        EPINEPHrine 0.3 MG/0.3ML injection 2-pack    EPIPEN 2-EAMON    0.6 mL    Inject 0.3 mLs (0.3 mg) into the muscle as needed for anaphylaxis     Hx of bee sting allergy       * guanFACINE 1 MG tablet    TENEX    270 tablet    Take 3 tablets (3 mg) by mouth 2 times daily    Tic       * guanFACINE 1 MG tablet    TENEX    180 tablet    Take 3 tablets (3 mg) by mouth twice daily morning and evening.    Tic       hydrOXYzine 25 MG tablet    ATARAX    90 tablet    Take 1-2 tablets (25-50 mg) by mouth every 6 hours as needed for anxiety    TAHIR (generalized anxiety disorder)       hyoscyamine 0.125 MG tablet    ANASPAZ/LEVSIN    40 tablet    Take 1-2 tablets (125-250 mcg) by mouth every 4 hours as needed for cramping    Abdominal pain, generalized       hypromellose 0.3 % Soln ophthalmic solution    GENTEAL     1 drop every hour as needed for dry eyes        LACTAID PO      Take by mouth daily        lactulose 20 GM/30ML Soln     300 mL    Take 30 mLs by mouth 3 times daily as needed    Constipation, unspecified constipation type       lidocaine 2 % topical gel    XYLOCAINE     Apply 1 Tube topically daily Reported on 4/21/2017        lidocaine visc 2% 2.5mL/5mL & maalox/mylanta w/ simeth 2.5mL/5mL & diphenhydrAMINE 5mg/5mL Susp suspension    [facility] Mouth[facility]    1 Bottle    Swish and swallow 10 mLs in mouth every 6 hours as needed for mouth sores    Behcet's syndrome (H)       linaclotide 145 MCG capsule    LINZESS    90 capsule    Take 1 capsule (145 mcg) by mouth every morning (before breakfast)    Chronic idiopathic constipation       LORazepam 1 MG tablet    ATIVAN    60 tablet    Take 0.5 tablets (0.5 mg) by mouth 2 times daily as needed for other (atypical chest pain) Do not operate a vehicle after taking this medication    Chronic abdominal pain       lubiprostone 24 MCG capsule    AMITIZA    30 capsule    Take 1 capsule (24 mcg) by mouth 2 times daily (with meals)    Chronic idiopathic constipation       metroNIDAZOLE 0.75 % vaginal gel    METROGEL    70 g    Place 1 applicator (5 g) vaginally At Bedtime for 5 days    Bacterial vaginosis       *  norgestimate-ethinyl estradiol 0.25-35 MG-MCG per tablet    SPRINTEC 28    84 tablet    Take 1 tablet by mouth daily    General counseling for prescription of oral contraceptives       * SPRINTEC 28 0.25-35 MG-MCG per tablet   Generic drug:  norgestimate-ethinyl estradiol     84 tablet    1 by mouth daily    General counseling for prescription of oral contraceptives       omeprazole 40 MG capsule    priLOSEC    90 capsule    Take 1 capsule (40 mg) by mouth daily    Gastroesophageal reflux disease, esophagitis presence not specified       ondansetron 8 MG tablet    ZOFRAN    60 tablet    Take 1 tablet (8 mg) by mouth every 8 hours as needed for nausea    Nausea       sucralfate 1 GM/10ML suspension    CARAFATE    1200 mL    Take 10 mLs (1 g) by mouth 4 times daily    Bile reflux gastritis, Nausea       triamcinolone 0.1 % cream    KENALOG    15 g    Apply sparingly to oral ulcers three times daily for 14 days as needed.    Behcet's syndrome (H)       * Notice:  This list has 8 medication(s) that are the same as other medications prescribed for you. Read the directions carefully, and ask your doctor or other care provider to review them with you.

## 2018-04-30 ENCOUNTER — THERAPY VISIT (OUTPATIENT)
Dept: SLEEP MEDICINE | Facility: CLINIC | Age: 23
End: 2018-04-30
Payer: COMMERCIAL

## 2018-04-30 DIAGNOSIS — G47.19 EXCESSIVE DAYTIME SLEEPINESS: ICD-10-CM

## 2018-04-30 DIAGNOSIS — G25.81 RESTLESS LEGS SYNDROME (RLS): ICD-10-CM

## 2018-04-30 DIAGNOSIS — R29.818 SUSPECTED SLEEP APNEA: ICD-10-CM

## 2018-04-30 PROCEDURE — 95810 POLYSOM 6/> YRS 4/> PARAM: CPT | Performed by: INTERNAL MEDICINE

## 2018-04-30 NOTE — MR AVS SNAPSHOT
After Visit Summary   4/30/2018    Samara Oropeza    MRN: [id number]           Patient Information     Date Of Birth          1995        Visit Information        Provider Department      4/30/2018 8:00 PM BK BED 1 [facility]        Today's Diagnoses     Excessive daytime sleepiness        Restless legs syndrome (RLS)        Suspected sleep apnea           Follow-ups after your visit        Your next 10 appointments already scheduled     May 01, 2018  7:30 AM CDT   LAB with SUJIT LAB   [facility]    [address] [facility] [address]   [phone]           Please do not eat 10-12 hours before your appointment if you are coming in fasting for labs on lipids, cholesterol, or glucose (sugar). This does not apply to pregnant women. Water, hot tea and black coffee (with nothing added) are okay. Do not drink other fluids, diet soda or chew gum.            May 01, 2018  8:00 AM CDT   Actigraphy Drop Off with SUJIT ESTRADA DME   [facility] ([facility]    [address]  [facility] [address]   [phone]            May 01, 2018 10:45 AM CDT   Return Visit with Cata Hurst NP   [facility] ([facility])    [address]  [address]  [facility] [address]   [phone]            May 08, 2018  2:45 PM CDT   EP NEW with Korey David MD   [facility] ([facility])    [address] [place] [address] [address]  [facility] [address]   [phone] OPT 2            May 15, 2018 11:00 AM CDT   New Sleep Patient with Dashawn Colunga MD   [facility] ([facility])    [address] [place] [address] [address]  [facility] [address]   [phone]            May 18, 2018 11:00 AM CDT   (Arrive by 10:30 AM)   New Visit with  Ernestina Patel, [facility] ([facility])    [facility]  [address]  [facility] [address]   [phone]            May 24, 2018  2:30 PM CDT   Return Sleep Patient with Kimo Gaston PsyD   [facility] ([facility])    [address] [facility] [address]  [facility] [address]   [phone]            May 29, 2018  3:00 PM CDT   (Arrive by 2:45 PM)   Return Botox with Frederick Bender MD   [facility] ([facility])    [address] [facility]  3rd Floor  [facility] [id number] [phone]            May 31, 2018  3:15 PM CDT   RETURN RETINA with Umer Heaton MD   [facility] ([facility])    [address]  [address] [address]  [facility] [address] [phone]            Jul 17, 2018 10:30 AM CDT   Return Visit with Austen Marquez MD   [facility] ([facility])    [address] [address] [phone]              Who to contact     If you have questions or need follow up information about today's clinic visit or your schedule please contact [facility] directly at [phone].  Normal or non-critical lab and imaging results will be communicated to you by MyChart, letter or phone within 4 business days after the clinic has received the results. If you do not hear from us within 7 days, please contact the clinic through MyChart or phone. If you have a critical or abnormal lab result, we will notify you by phone as soon as possible.  Submit refill requests through [employer] or call your pharmacy and they will forward the refill request to us. Please allow 3 business days for your refill to be completed.          Additional Information About Your Visit        MyChart Information      [employer] gives you secure access to your electronic health record. If you see a primary care provider, you can also send messages to your care team and make appointments. If you have questions, please call your primary care clinic.  If you do not have a primary care provider, please call [phone] and they will assist you.        Care EveryWhere ID     This is your Care EveryWhere ID. This could be used by other organizations to access your [place] medical records  [id number]         Blood Pressure from Last 3 Encounters:   04/24/18 110/72   04/18/18 122/79   04/17/18 104/70    Weight from Last 3 Encounters:   04/18/18 66.7 kg (147 lb)   04/17/18 66.7 kg (147 lb)   04/16/18 66.7 kg (147 lb)              We Performed the Following     Comprehensive Sleep Study        Primary Care Provider Office Phone # Fax #    Sonja EVI Laguerre [facility] [phone]       [address] [facility] [address]  [facility] [address]        Equal Access to Services     FRANK [facility]IL[facility] : Hadii aad ku hadasho Soomaali, waaxda luqadaha, qaybta kaalmada adeegyada, waxay idiin hayarlet rodriguez . So [facility] [phone].    ATENCIÓN: Si habla español, tiene a livingston disposición servicios gratuitos de asistencia lingüística. [facility] [phone].    We comply with applicable federal civil rights laws and [place] laws. We do not discriminate on the basis of race, color, national origin, age, disability, sex, sexual orientation, or gender identity.            Thank you!     Thank you for choosing [facility]  for your care. Our goal is always to provide you with excellent care. Hearing back from our patients is one way we can continue to improve our services. Please take a few minutes to complete the written survey that you may receive in the mail after your visit with us. Thank you!             Your Updated Medication List - Protect others around you: Learn how to safely use, store and throw away your medicines at  www.disposemymeds.org.          This list is accurate as of 4/30/18 11:59 PM.  Always use your most recent med list.                   Brand Name Dispense Instructions for use Diagnosis    albuterol 108 (90 Base) MCG/ACT Inhaler    PROAIR HFA/PROVENTIL HFA/VENTOLIN HFA    1 Inhaler    Inhale 2 puffs into the lungs every 6 hours as needed for shortness of breath / dyspnea or wheezing    Atypical chest pain       amitriptyline 50 MG tablet    ELAVIL    30 tablet    Take 1 tablet (50 mg) by mouth At Bedtime    Abdominal pain, generalized, Insomnia due to medical condition       apremilast 30 MG tablet    OTEZLA    180 tablet    Take 1 tablet (30 mg) by mouth 2 times daily    Behcet's disease (H)       ASPIRIN CHILDRENS 81 MG chewable tablet   Generic drug:  aspirin      Take 81 mg by mouth as needed        aspirin-acetaminophen-caffeine 250-250-65 MG per tablet    EXCEDRIN MIGRAINE     Take 1 tablet by mouth every 6 hours as needed for headaches        benzocaine 20 % Gel    TOPICALE XTRA    30 g    Apply as needed locally to mouth or nasal ulcers for pain; 4 times daily as needed    Behcet's disease (H)       betamethasone valerate 0.1 % cream    VALISONE     Apply topically 2 times daily        * BOTOX IJ      Inject 165 Units as directed once Lot#: /C3 Exp: 10/2020        * BOTOX IJ      Inject 165 Units as directed once Lot # /C3  Exp:  10/2020        * botulinum toxin type A 100 units injection    BOTOX    200 Units    Inject 200 Units into the muscle every 3 months    Cervical dystonia       * BOTOX IJ      Inject 165 Units into the muscle once Lot /C3 Exp 06/2020        carbamide peroxide 6.5 % otic solution    DEBROX     5 drops 2 times daily        clobetasol 0.05 % cream    TEMOVATE    60 g    Apply topically 2 times daily    Folliculitis       colchicine 0.6 MG tablet     45 tablet    0.6mg twice daily X 2 weeks and then 0.6mg daily X 2 weeks and then stop.    Behcet's disease (H)        COMPOUNDED NON-CONTROLLED SUBSTANCE - PHARMACY TO MIX COMPOUNDED MEDICATION    CMPD RX    30 capsule    Take one capsule in the morning    Fibromyalgia       cyproheptadine 4 MG tablet    PERIACTIN    30 tablet    Take 1 tablet (4 mg) by mouth At Bedtime For nightmares    Nightmares       diclofenac 1 % Gel topical gel    VOLTAREN    100 g    Apply 2-4 grams to affected area(s) up to 4 times per day as needed. This is an anti-inflammatory medication.    Polyarthralgia       dicyclomine 20 MG tablet    BENTYL    120 tablet    Take 1 tablet (20 mg) by mouth 4 times daily as needed    Abdominal cramping       diltiazem 2% in PLO cream (FV COMPOUNDED) 2% Gel     30 g    Apply small pea size amount three times daily to anus until pain is gone.    Anal fissure       diphenhydrAMINE 25 MG tablet    BENADRYL    30 tablet    Take 1 tablet (25 mg) by mouth every 8 hours as needed for allergies        EPINEPHrine 0.3 MG/0.3ML injection 2-pack    EPIPEN 2-EAMON    0.6 mL    Inject 0.3 mLs (0.3 mg) into the muscle as needed for anaphylaxis    Hx of bee sting allergy       * guanFACINE 1 MG tablet    TENEX    270 tablet    Take 3 tablets (3 mg) by mouth 2 times daily    Tic       * guanFACINE 1 MG tablet    TENEX    180 tablet    Take 3 tablets (3 mg) by mouth twice daily morning and evening.    Tic       hydrOXYzine 25 MG tablet    ATARAX    90 tablet    Take 1-2 tablets (25-50 mg) by mouth every 6 hours as needed for anxiety    TAHIR (generalized anxiety disorder)       hyoscyamine 0.125 MG tablet    ANASPAZ/LEVSIN    40 tablet    Take 1-2 tablets (125-250 mcg) by mouth every 4 hours as needed for cramping    Abdominal pain, generalized       hypromellose 0.3 % Soln ophthalmic solution    GENTEAL     1 drop every hour as needed for dry eyes        LACTAID PO      Take by mouth daily        lactulose 20 GM/30ML Soln     300 mL    Take 30 mLs by mouth 3 times daily as needed    Constipation, unspecified constipation type        lidocaine 2 % topical gel    XYLOCAINE     Apply 1 Tube topically daily Reported on 4/21/2017        lidocaine visc 2% 2.5mL/5mL & maalox/mylanta w/ simeth 2.5mL/5mL & diphenhydrAMINE 5mg/5mL Susp suspension    [facility] Mouthwash [facility]    1 Bottle    Swish and swallow 10 mLs in mouth every 6 hours as needed for mouth sores    Behcet's syndrome (H)       linaclotide 145 MCG capsule    LINZESS    90 capsule    Take 1 capsule (145 mcg) by mouth every morning (before breakfast)    Chronic idiopathic constipation       LORazepam 1 MG tablet    ATIVAN    60 tablet    Take 0.5 tablets (0.5 mg) by mouth 2 times daily as needed for other (atypical chest pain) Do not operate a vehicle after taking this medication    Chronic abdominal pain       lubiprostone 24 MCG capsule    AMITIZA    30 capsule    Take 1 capsule (24 mcg) by mouth 2 times daily (with meals)    Chronic idiopathic constipation       * norgestimate-ethinyl estradiol 0.25-35 MG-MCG per tablet    SPRINTEC 28    84 tablet    Take 1 tablet by mouth daily    General counseling for prescription of oral contraceptives       * SPRINTEC 28 0.25-35 MG-MCG per tablet   Generic drug:  norgestimate-ethinyl estradiol     84 tablet    1 by mouth daily    General counseling for prescription of oral contraceptives       omeprazole 40 MG capsule    priLOSEC    90 capsule    Take 1 capsule (40 mg) by mouth daily    Gastroesophageal reflux disease, esophagitis presence not specified       ondansetron 8 MG tablet    ZOFRAN    60 tablet    Take 1 tablet (8 mg) by mouth every 8 hours as needed for nausea    Nausea       sucralfate 1 GM/10ML suspension    CARAFATE    1200 mL    Take 10 mLs (1 g) by mouth 4 times daily    Bile reflux gastritis, Nausea       triamcinolone 0.1 % cream    KENALOG    15 g    Apply sparingly to oral ulcers three times daily for 14 days as needed.    Behcet's syndrome (H)       * Notice:  This list has 8 medication(s) that are the same as other medications  prescribed for you. Read the directions carefully, and ask your doctor or other care provider to review them with you.

## 2018-05-01 ENCOUNTER — THERAPY VISIT (OUTPATIENT)
Dept: SLEEP MEDICINE | Facility: CLINIC | Age: 23
End: 2018-05-01
Payer: COMMERCIAL

## 2018-05-01 ENCOUNTER — DOCUMENTATION ONLY (OUTPATIENT)
Dept: SLEEP MEDICINE | Facility: CLINIC | Age: 23
End: 2018-05-01
Payer: COMMERCIAL

## 2018-05-01 DIAGNOSIS — G47.19 EXCESSIVE DAYTIME SLEEPINESS: ICD-10-CM

## 2018-05-01 LAB
AMPHETAMINES UR QL: NOT DETECTED NG/ML
BARBITURATES UR QL SCN: NOT DETECTED NG/ML
BENZODIAZ UR QL SCN: NOT DETECTED NG/ML
BUPRENORPHINE UR QL: NOT DETECTED NG/ML
CANNABINOIDS UR QL: ABNORMAL NG/ML
COCAINE UR QL SCN: NOT DETECTED NG/ML
D-METHAMPHET UR QL: NOT DETECTED NG/ML
METHADONE UR QL SCN: NOT DETECTED NG/ML
OPIATES UR QL SCN: NOT DETECTED NG/ML
OXYCODONE UR QL SCN: NOT DETECTED NG/ML
PCP UR QL SCN: NOT DETECTED NG/ML
PROPOXYPH UR QL: NOT DETECTED NG/ML
TRICYCLICS UR QL SCN: ABNORMAL NG/ML

## 2018-05-01 PROCEDURE — 80306 DRUG TEST PRSMV INSTRMNT: CPT | Performed by: PHYSICIAN ASSISTANT

## 2018-05-01 PROCEDURE — 95805 MULTIPLE SLEEP LATENCY TEST: CPT | Performed by: INTERNAL MEDICINE

## 2018-05-01 NOTE — MR AVS SNAPSHOT
After Visit Summary   5/1/2018    Samara Oropeza    MRN: [id number]           Patient Information     Date Of Birth          1995        Visit Information        Provider Department      5/1/2018 7:00 AM BK BED 5 [facility]         Follow-ups after your visit        Your next 10 appointments already scheduled     May 01, 2018 10:45 AM CDT   [facility] Visit with Cata Hurst NP   [facility]    [address]  [address]  [facility] [address] [phone]            May 08, 2018  2:45 PM CDT   EP NEW with Korey David MD   [facility] ([facility])    [address]  [facility] [address] [phone] OPT 2            May 15, 2018 11:00 AM CDT   New Sleep Patient with Dashawn Colunga MD   [facility] ([facility])    [address] [place]  [facility] [address]   [phone]            May 18, 2018 11:00 AM CDT   (Arrive by 10:30 AM)   New Visit with Ernestina Patel [facility] ([facility])    [address] [address]   [phone]            May 24, 2018  2:30 PM CDT   Return Sleep Patient with Kimo Gaston PsyD   [facility] ([facility])    [address] [place]  [facility] [address]   [phone]            May 29, 2018  3:00 PM CDT   (Arrive by 2:45 PM)   Return Botox with Frederick Bender MD   [facility] ([facility])    [address] [address] [phone]            May 31, 2018  3:15 PM CDT   RETURN RETINA with Umer Heaton MD   [facility] ([facility])    [address]  [address]  [address]  [address]  [facility] [address]   [phone]            Jul 17, 2018 10:30 AM CDT   Return Visit with Austen Marquez MD   [facility]    [address] [facility] [address] [phone]            Aug 21, 2018 11:00 AM CDT   (Arrive by 10:45 AM)   Return Botox with Frederick Bender MD   [facility] ([facility])    [address]  3rd [address]  [facility] [address] [phone]              Who to contact     If you have questions or need follow up information about today's clinic visit or your schedule please contact [facility] directly at [phone].  Normal or non-critical lab and imaging results will be communicated to you by MyChart, letter or phone within 4 business days after the clinic has received the results. If you do not hear from us within 7 days, please contact the clinic through [employer] or phone. If you have a critical or abnormal lab result, we will notify you by phone as soon as possible.  Submit refill requests through [employer] or call your pharmacy and they will forward the refill request to us. Please allow 3 business days for your refill to be completed.          Additional Information About Your Visit        [employer] Information     [employer] gives you secure access to your electronic health record. If you see a primary care provider, you can also send messages to your care team and make appointments. If you have questions, please call your primary care clinic.  If you do not have a primary care provider, please call [phone] and they will assist you.        Care EveryWhere ID     This is your Care EveryWhere ID. This could be used by other organizations to access your [facility] medical records  [id number]         Blood Pressure from Last 3 Encounters:   04/24/18 110/72    04/18/18 122/79   04/17/18 104/70    Weight from Last 3 Encounters:   04/18/18 66.7 kg (147 lb)   04/17/18 66.7 kg (147 lb)   04/16/18 66.7 kg (147 lb)              Today, you had the following     No orders found for display       Primary Care Provider Office Phone # Fax #    Sonja Abreu, APRN [facility] [phone]       [address] [facility] [facility] [address]  [facility] [address]        Equal Access to Services     NABIL [facility] : Hadii aad ku hadasho Soomaali, waaxda luqadaha, qaybta kaalmada adeegyada, waxay idiin hayarlet rodriguez . So [facility] [phone].    ATENCIÓN: Si habla español, tiene a livingston disposición servicios gratuitos de asistencia lingüística. [facility] [phone].    We comply with applicable federal civil rights laws and [place] laws. We do not discriminate on the basis of race, color, national origin, age, disability, sex, sexual orientation, or gender identity.            Thank you!     Thank you for choosing [facility]  for your care. Our goal is always to provide you with excellent care. Hearing back from our patients is one way we can continue to improve our services. Please take a few minutes to complete the written survey that you may receive in the mail after your visit with us. Thank you!             Your Updated Medication List - Protect others around you: Learn how to safely use, store and throw away your medicines at www.disposemymeds.org.          This list is accurate as of 5/1/18  8:34 AM.  Always use your most recent med list.                   Brand Name Dispense Instructions for use Diagnosis    albuterol 108 (90 Base) MCG/ACT Inhaler    PROAIR HFA/PROVENTIL HFA/VENTOLIN HFA    1 Inhaler    Inhale 2 puffs into the lungs every 6 hours as needed for shortness of breath / dyspnea or wheezing    Atypical chest pain       amitriptyline 50 MG tablet    ELAVIL    30 tablet    Take 1 tablet (50 mg) by mouth At Bedtime    Abdominal pain, generalized, Insomnia  due to medical condition       apremilast 30 MG tablet    OTEZLA    180 tablet    Take 1 tablet (30 mg) by mouth 2 times daily    Behcet's disease (H)       ASPIRIN CHILDRENS 81 MG chewable tablet   Generic drug:  aspirin      Take 81 mg by mouth as needed        aspirin-acetaminophen-caffeine 250-250-65 MG per tablet    EXCEDRIN MIGRAINE     Take 1 tablet by mouth every 6 hours as needed for headaches        benzocaine 20 % Gel    TOPICALE XTRA    30 g    Apply as needed locally to mouth or nasal ulcers for pain; 4 times daily as needed    Behcet's disease (H)       betamethasone valerate 0.1 % cream    VALISONE     Apply topically 2 times daily        * BOTOX IJ      Inject 165 Units as directed once Lot#: /C3 Exp: 10/2020        * BOTOX IJ      Inject 165 Units as directed once Lot # /C3  Exp:  10/2020        * botulinum toxin type A 100 units injection    BOTOX    200 Units    Inject 200 Units into the muscle every 3 months    Cervical dystonia       * BOTOX IJ      Inject 165 Units into the muscle once Lot /C3 Exp 06/2020        carbamide peroxide 6.5 % otic solution    DEBROX     5 drops 2 times daily        clobetasol 0.05 % cream    TEMOVATE    60 g    Apply topically 2 times daily    Folliculitis       colchicine 0.6 MG tablet     45 tablet    0.6mg twice daily X 2 weeks and then 0.6mg daily X 2 weeks and then stop.    Behcet's disease (H)       COMPOUNDED NON-CONTROLLED SUBSTANCE - PHARMACY TO MIX COMPOUNDED MEDICATION    CMPD RX    30 capsule    Take one capsule in the morning    Fibromyalgia       cyproheptadine 4 MG tablet    PERIACTIN    30 tablet    Take 1 tablet (4 mg) by mouth At Bedtime For nightmares    Nightmares       diclofenac 1 % Gel topical gel    VOLTAREN    100 g    Apply 2-4 grams to affected area(s) up to 4 times per day as needed. This is an anti-inflammatory medication.    Polyarthralgia       dicyclomine 20 MG tablet    BENTYL    120 tablet    Take 1 tablet (20 mg) by  mouth 4 times daily as needed    Abdominal cramping       diltiazem 2% in PLO cream (FV COMPOUNDED) 2% Gel     30 g    Apply small pea size amount three times daily to anus until pain is gone.    Anal fissure       diphenhydrAMINE 25 MG tablet    BENADRYL    30 tablet    Take 1 tablet (25 mg) by mouth every 8 hours as needed for allergies        EPINEPHrine 0.3 MG/0.3ML injection 2-pack    EPIPEN 2-EAMON    0.6 mL    Inject 0.3 mLs (0.3 mg) into the muscle as needed for anaphylaxis    Hx of bee sting allergy       * guanFACINE 1 MG tablet    TENEX    270 tablet    Take 3 tablets (3 mg) by mouth 2 times daily    Tic       * guanFACINE 1 MG tablet    TENEX    180 tablet    Take 3 tablets (3 mg) by mouth twice daily morning and evening.    Tic       hydrOXYzine 25 MG tablet    ATARAX    90 tablet    Take 1-2 tablets (25-50 mg) by mouth every 6 hours as needed for anxiety    TAHIR (generalized anxiety disorder)       hyoscyamine 0.125 MG tablet    ANASPAZ/LEVSIN    40 tablet    Take 1-2 tablets (125-250 mcg) by mouth every 4 hours as needed for cramping    Abdominal pain, generalized       hypromellose 0.3 % Soln ophthalmic solution    GENTEAL     1 drop every hour as needed for dry eyes        LACTAID PO      Take by mouth daily        lactulose 20 GM/30ML Soln     300 mL    Take 30 mLs by mouth 3 times daily as needed    Constipation, unspecified constipation type       lidocaine 2 % topical gel    XYLOCAINE     Apply 1 Tube topically daily Reported on 4/21/2017        lidocaine visc 2% 2.5mL/5mL & maalox/mylanta w/ simeth 2.5mL/5mL & diphenhydrAMINE 5mg/5mL Susp suspension    [facility]    1 Bottle    Swish and swallow 10 mLs in mouth every 6 hours as needed for mouth sores    Behcet's syndrome (H)       linaclotide 145 MCG capsule    LINZESS    90 capsule    Take 1 capsule (145 mcg) by mouth every morning (before breakfast)    Chronic idiopathic constipation       LORazepam 1 MG tablet    ATIVAN    60  tablet    Take 0.5 tablets (0.5 mg) by mouth 2 times daily as needed for other (atypical chest pain) Do not operate a vehicle after taking this medication    Chronic abdominal pain       lubiprostone 24 MCG capsule    AMITIZA    30 capsule    Take 1 capsule (24 mcg) by mouth 2 times daily (with meals)    Chronic idiopathic constipation       * norgestimate-ethinyl estradiol 0.25-35 MG-MCG per tablet    SPRINTEC 28    84 tablet    Take 1 tablet by mouth daily    General counseling for prescription of oral contraceptives       * SPRINTEC 28 0.25-35 MG-MCG per tablet   Generic drug:  norgestimate-ethinyl estradiol     84 tablet    1 by mouth daily    General counseling for prescription of oral contraceptives       omeprazole 40 MG capsule    priLOSEC    90 capsule    Take 1 capsule (40 mg) by mouth daily    Gastroesophageal reflux disease, esophagitis presence not specified       ondansetron 8 MG tablet    ZOFRAN    60 tablet    Take 1 tablet (8 mg) by mouth every 8 hours as needed for nausea    Nausea       sucralfate 1 GM/10ML suspension    CARAFATE    1200 mL    Take 10 mLs (1 g) by mouth 4 times daily    Bile reflux gastritis, Nausea       triamcinolone 0.1 % cream    KENALOG    15 g    Apply sparingly to oral ulcers three times daily for 14 days as needed.    Behcet's syndrome (H)       * Notice:  This list has 8 medication(s) that are the same as other medications prescribed for you. Read the directions carefully, and ask your doctor or other care provider to review them with you.

## 2018-05-03 ENCOUNTER — MYC MEDICAL ADVICE (OUTPATIENT)
Dept: OPHTHALMOLOGY | Facility: CLINIC | Age: 23
End: 2018-05-03

## 2018-05-03 DIAGNOSIS — H04.123 BILATERAL DRY EYES: Primary | ICD-10-CM

## 2018-05-03 NOTE — TELEPHONE ENCOUNTER
artifical tear ointment recommended in plan  OTC medication  Rx sent per pt request  Calixto Bauman RN 12:00 PM 05/03/18

## 2018-05-04 LAB — SLPCOMP: NORMAL

## 2018-05-04 NOTE — PROCEDURES
" SLEEP STUDY INTERPRETATION  DIAGNOSTIC POLYSOMNOGRAPHY REPORT      Patient: LUCINA BAH  YOB: 1995  Study Date: 4/30/2018  MRN: [id number]  Referring Provider: -  Ordering Provider: FATEMEH Garvey    Indications for Polysomnography: The patient is a 22 y old Female who is 5' 2\" and weighs 149.0 lbs. Her BMI is 27.4, [place] sleepiness scale 18.0 and neck circumference is 34.0 cm. Relevant medical history includes Insomnia, PTSD, deperssion, anxiety, nightmares, fibromyalgia, Behcet's disease, migraines, and spells of altered consciousness. A diagnostic polysomnogram was performed to evaluate for IDA, parasomnias, and hypersomnia.    Polysomnogram Data: A full night polysomnogram recorded the standard physiologic parameters including EEG, EOG, EMG, ECG, nasal and oral airflow. Respiratory parameters of chest and abdominal movements were recorded with respiratory inductance plethysmography. Oxygen saturation was recorded by pulse oximetry. Hypopnea scoring rule used: 1B 4%.   Extended seizure montage employed during study.  10 - 11 Hz symmetric posterior rhythm during drowsy wakefulness.  No abnormal EEG findings or epileptiform activity noted.    Sleep Architecture: Decreased sleep latency without sleep aid (no documentation of home medication use), normal arousal index, and increased sleep efficiency.  The total recording time of the polysomnogram was 434.3 minutes. The total sleep time was 419.0 minutes. Sleep latency was decreased at 4.2 minutes without the use of a sleep aid. REM latency was 268.5 minutes. Arousal index was normal at 3.9 arousals per hour. Sleep efficiency was increased at 96.5%. Wake after sleep onset was 10.0 minutes. The patient spent 9.4% of total sleep time in Stage N1, 68.7% in Stage N2, 7.6% in Stage N3, and 14.2% in REM. Time in REM supine was 38.5 minutes.    Respiration: Minimal snoring without evidence of significant sleep-disordered breathing.    Events ? " The polysomnogram revealed a presence of - obstructive, 2 central, and - mixed apneas resulting in an apnea index of 0.3 events per hour. There were - obstructive hypopneas and - central hypopneas resulting in an obstructive hypopnea index of - and central hypopnea index of - events per hour. The combined apnea/hypopnea index was 0.3 events per hour (central apnea/hypopnea index was 0.3 events per hour). The REM AHI was - events per hour. The supine AHI was - events per hour. The RERA index was - events per hour.  The RDI was 0.3 events per hour.    Snoring - was reported as very mild.    Respiratory rate and pattern - was normal.    Sustained Sleep Associated Hypoventilation - Transcutaneous carbon dioxide monitoring was not used; however significant hypoventilation was not suggested by oximetry.    Sleep Associated Hypoxemia - (Greater than 5 minutes O2 sat at or below 88%) was not present. Baseline oxygen saturation was 97.5%. Lowest oxygen saturation was 89.0%. Time spent less than or equal to 88% was 0 minutes. Time spent less than or equal to 89% was 0.1 minutes.    Movement Activity: Increased REM EMG tone without kalyani dream enactment behavior.      Periodic Limb Activity - There were 35 PLMs during the entire study. The PLM index was 5.0 movements per hour. The PLM Arousal Index was - per hour.    REM EMG Activity - Excessive transient muscle activity was present.    Nocturnal Behavior - Abnormal sleep related behaviors were not noted.    Bruxism - None apparent.    Cardiac Summary: No significant arrhythmias noted.  The average pulse rate was 78.1 bpm. The minimum pulse rate was 58.9 bpm while the maximum pulse rate was 115.1 bpm.  Arrhythmias were not noted.    Pre PSG Evaluation: Urine drug screen positive for THC (unexpected) and TCAs (expected).  Sleep logs consistent except on wake up times.  Actigraphy shows fragmented sleep with prolonged awakenings, chronically inadequate sleep, and significant  weekend delays.    Urine Drug Testing  o THC - Positive (denied use at consult)  o TCA - Positive (current medication)  o BZD - Negative (on medication list)    Sleep Log - Bedtimes and nocturnal awakenings consistent with actigraphy, however, out of bed time was not matched.    Actigraphy -   o Dates of recording - From 4/13/2018 to 5/1/2018.  o Quality - Good (minimal off wrist time, some marker button use).  o Bed Time - Average at 9:14 PM (range of 6:48 PM - 12:50 AM).  o Get Up Time - Average at 3:33 AM (range of 11:33 PM - 8:55 AM).  o Time in Bed - Average at 6:18 hours (range of 3:48 - 9:15 hours).  o Total Sleep Time - Average at 5:11 hours (range 1:02 - 7:44 hours).  o Sleep Onset Delay - Delays were observed on weekends.  o Sleep Periods - Interrupted by movements.  o Light exposure periods - Appropriately timed to sleep schedule  o Napping - Noted on 1 - 2 days per week.  Actigraphy Interpretation: Sleep wake pattern is consistent with:  o Irregular sleep pattern  o Chronically inadequate sleep  o Sleep phase delay    Assessment:     Extended seizure montage employed during study.  10 - 11 Hz symmetric posterior rhythm during drowsy wakefulness.  No abnormal EEG findings or epileptiform activity noted.    Decreased sleep latency without sleep aid (no documentation of home medication use), normal arousal index, and increased sleep efficiency.    Minimal snoring without evidence of significant sleep-disordered breathing.    Increased REM EMG tone without klayani dream enactment behavior.      No significant arrhythmias noted.    Urine drug screen positive for THC (unexpected) and TCAs (expected).      Sleep logs consistent except on wake up times.      Actigraphy shows fragmented sleep with prolonged awakenings, chronically inadequate sleep, and significant weekend delays.    Recommendations:    Clinical correlation for abnormal REM EMG tone, especially in light of TCA use.    Hypersomnia testing may not be  "interpretable in the context of above urine drug screen and actigraphy results.    Advice regarding the risks of drowsy driving.    Suggest optimizing sleep schedule and avoiding sleep deprivation.    Diagnostic Codes: Parasomnia, Unspecified G47.50; Hypersomnia, Unspecified F51.11   _____________________________________   Electronically Signed By: Dashawn Colunga MD 5/4/2018         -------------------------------------------------       MSLT/MWT REPORT          Patient Name: LUCINA BAH Study Date: 5/1/2018   YOB: 1995 Study Type: MSLT   Age:  22 y MRN: [id number]   Sex: Female Interp Physician: mary jane   BMI:  27.4 Ordering Physician: FATEMEH Garvey   Height: 5' 2\" Referring Physician: -   Weight: 149.0 lbs Recording Tech: LAMBERTO Sanabria [facility]/  RAUL Moon   [place]: 18.0 Neck Size (cm): 34.0 Scoring Tech: LAMBERTO Sanabria [facility]/  RAUL Moon   Nap Summary       Nap 1 Nap 2 Nap 3 Nap 4 Nap 5 Average   Lights Off 08:46:09 AM 10:16:30 AM 11:46:30 AM 01:16:00 PM 02:44:30 PM -   Lights On 09:03:58 AM 10:34:56 AM 12:11:23 PM 01:32:57 PM 03:05:57 PM -   Time In Bed 17.8 18.4 24.9 16.9 21.4 19.9   Sleep Time 15.0 16.0 11.9 15.5 3.5 12.4   Sleep Efficiency 84.2% 86.6% 47.9% 91.3% 16.3% 62.2%   Sleep Onset 08:48:28 AM 10:18:58 AM 11:51:28 AM 01:17:28 PM 02:46:58 PM -   Sleep Latency 2.3 2.5 5.0 1.5 2.5 2.7   REM Onset - - - - - -   REM Sleep Onset Latency - - - - - -         Recording / Sleep Tech Comments    This MSLT was recorded after overnight polysomnography -- Lights On at 0656 (TRT =434.3 min, TST = 419.0 min).  Patient was monitored all day and demonstrated compliance with all technologist s instructions.  Sleep onset detected in 5 study periods .  SOREMPs were not observed in study periods .  Actiwatch was collected and downloaded.  Random UA was collected.    Physician Interpretation  Abnormal MSLT results at 5 naps with MSL of 2.7 minutes (0 SOREMs).  PSG showed sufficient " sleep, however, Actigraphy and Urine Toxicology reports indicate that this test is non-diagnostic for hypersomnolence disorders.  Recommend addressing contributing factors and considering repeat testing.  _____________________________________   Electronically Signed By: Dashawn Colunga MD 5/4/2018

## 2018-05-08 ENCOUNTER — OFFICE VISIT (OUTPATIENT)
Dept: CARDIOLOGY | Facility: CLINIC | Age: 23
End: 2018-05-08
Attending: NURSE PRACTITIONER
Payer: COMMERCIAL

## 2018-05-08 VITALS
WEIGHT: 147.6 LBS | SYSTOLIC BLOOD PRESSURE: 110 MMHG | HEIGHT: 63 IN | DIASTOLIC BLOOD PRESSURE: 80 MMHG | OXYGEN SATURATION: 99 % | HEART RATE: 97 BPM | BODY MASS INDEX: 26.15 KG/M2

## 2018-05-08 DIAGNOSIS — R00.2 PALPITATIONS: Primary | ICD-10-CM

## 2018-05-08 PROCEDURE — 99203 OFFICE O/P NEW LOW 30 MIN: CPT | Performed by: INTERNAL MEDICINE

## 2018-05-08 PROCEDURE — 93000 ELECTROCARDIOGRAM COMPLETE: CPT | Performed by: INTERNAL MEDICINE

## 2018-05-08 NOTE — MR AVS SNAPSHOT
After Visit Summary   5/8/2018    Samara Oropeza    MRN: [id number]           Patient Information     Date Of Birth          1995        Visit Information        Provider Department      5/8/2018 2:45 PM Korey David MD [facility]   [place]        Today's Diagnoses     Palpitations    -  1       Follow-ups after your visit        Your next 10 appointments already scheduled     May 15, 2018 11:00 AM CDT   New Sleep Patient with Dashawn Colunga MD   [facility] ([facility])    [address]  [facility] [address]   [phone]            May 18, 2018 11:00 AM CDT   (Arrive by 10:30 AM)   New Visit with Ernestina Patel [facility] ([address] [address]   [phone]            May 24, 2018  2:30 PM CDT   Return Sleep Patient with Kimo Gaston PsyD   [facility] ([facility])    [address]  [facility] [address]   [phone]            May 29, 2018  3:00 PM CDT   (Arrive by 2:45 PM)   Return Botox with Frederick Bender MD   [facility] ([facility])    [address] [address] [phone]            May 31, 2018  3:15 PM CDT   RETURN RETINA with Umer Heaton MD   [facility] ([facility])    [address]  [address] [address]  [facility] [address]   [phone]            Jun 13, 2018  3:15 PM CDT   Return Visit with Cata Hurst NP   [facility] ([facility])    [address]  [address]  [facility] [address] [phone]            Jul 17, 2018 10:30 AM CDT   Return Visit with Austen  "Valdez Marquez MD   [facility] ([facility])    [address] [facility] [address] [phone]            Aug 21, 2018 11:00 AM CDT   (Arrive by 10:45 AM)   Return Botox with Frederick Bender MD   [facility] ([facility])    [address] [facility]  3rd Floor  [facility] [address] [phone]              Who to contact     If you have questions or need follow up information about today's clinic visit or your schedule please contact [facility] directly at [phone].  Normal or non-critical lab and imaging results will be communicated to you by MyChart, letter or phone within 4 business days after the clinic has received the results. If you do not hear from us within 7 days, please contact the clinic through [facility] or phone. If you have a critical or abnormal lab result, we will notify you by phone as soon as possible.  Submit refill requests through [facility] or call your pharmacy and they will forward the refill request to us. Please allow 3 business days for your refill to be completed.          Additional Information About Your Visit        [facility] Information     [facility] gives you secure access to your electronic health record. If you see a primary care provider, you can also send messages to your care team and make appointments. If you have questions, please call your primary care clinic.  If you do not have a primary care provider, please call [phone] and they will assist you.        Care EveryWhere ID     This is your Care EveryWhere ID. This could be used by other organizations to access your [place] medical records  [id number]        Your Vitals Were     Pulse Height Pulse Oximetry BMI (Body Mass Index)          97 1.588 m (5' 2.5\") 99% 26.57 kg/m2         Blood Pressure from Last 3 Encounters:   05/08/18 110/80   04/24/18 " 110/72   04/18/18 122/79    Weight from Last 3 Encounters:   05/08/18 67 kg (147 lb 9.6 oz)   04/18/18 66.7 kg (147 lb)   04/17/18 66.7 kg (147 lb)              We Performed the Following     EKG 12-lead complete w/read - Clinics (performed today)        Primary Care Provider Office Phone # Fax #    EVI Cardona [phone]       [address] [facility] [address]  [facility] [address]        Equal Access to Services     [facility]: Hadii aad ku hadasho Soomaali, waaxda luqadaha, qaybta kaalmada adeegyada, waxay idiin hayaaemmy adedavid rodriguez . So [facility] [phone].    ATENCIÓN: Si habla español, tiene a livingston disposición servicios gratuitos de asistencia lingüística. [facility] [phone].    We comply with applicable federal civil rights laws and [place] laws. We do not discriminate on the basis of race, color, national origin, age, disability, sex, sexual orientation, or gender identity.            Thank you!     Thank you for choosing [facility]  for your care. Our goal is always to provide you with excellent care. Hearing back from our patients is one way we can continue to improve our services. Please take a few minutes to complete the written survey that you may receive in the mail after your visit with us. Thank you!             Your Updated Medication List - Protect others around you: Learn how to safely use, store and throw away your medicines at www.disposemymeds.org.          This list is accurate as of 5/8/18  4:03 PM.  Always use your most recent med list.                   Brand Name Dispense Instructions for use Diagnosis    albuterol 108 (90 Base) MCG/ACT Inhaler    PROAIR HFA/PROVENTIL HFA/VENTOLIN HFA    1 Inhaler    Inhale 2 puffs into the lungs every 6 hours as needed for shortness of breath / dyspnea or wheezing    Atypical chest pain       amitriptyline 50 MG tablet    ELAVIL    30 tablet    Take 1 tablet (50 mg) by mouth At  Bedtime    Abdominal pain, generalized, Insomnia due to medical condition       apremilast 30 MG tablet    OTEZLA    180 tablet    Take 1 tablet (30 mg) by mouth 2 times daily    Behcet's disease (H)       artificial tears Oint ophthalmic ointment     1 Tube    0.5 inch strip each eye at night    Bilateral dry eyes       ASPIRIN CHILDRENS 81 MG chewable tablet   Generic drug:  aspirin      Take 81 mg by mouth as needed        aspirin-acetaminophen-caffeine 250-250-65 MG per tablet    EXCEDRIN MIGRAINE     Take 1 tablet by mouth every 6 hours as needed for headaches        benzocaine 20 % Gel    TOPICALE XTRA    30 g    Apply as needed locally to mouth or nasal ulcers for pain; 4 times daily as needed    Behcet's disease (H)       betamethasone valerate 0.1 % cream    VALISONE     Apply topically 2 times daily        * BOTOX IJ      Inject 165 Units as directed once Lot#: /C3 Exp: 10/2020        * BOTOX IJ      Inject 165 Units as directed once Lot # /C3  Exp:  10/2020        * botulinum toxin type A 100 units injection    BOTOX    200 Units    Inject 200 Units into the muscle every 3 months    Cervical dystonia       * BOTOX IJ      Inject 165 Units into the muscle once Lot /C3 Exp 06/2020        carbamide peroxide 6.5 % otic solution    DEBROX     5 drops 2 times daily        clobetasol 0.05 % cream    TEMOVATE    60 g    Apply topically 2 times daily    Folliculitis       colchicine 0.6 MG tablet     45 tablet    0.6mg twice daily X 2 weeks and then 0.6mg daily X 2 weeks and then stop.    Behcet's disease (H)       COMPOUNDED NON-CONTROLLED SUBSTANCE - PHARMACY TO MIX COMPOUNDED MEDICATION    CMPD RX    30 capsule    Take one capsule in the morning    Fibromyalgia       cyproheptadine 4 MG tablet    PERIACTIN    30 tablet    Take 1 tablet (4 mg) by mouth At Bedtime For nightmares    Nightmares       diclofenac 1 % Gel topical gel    VOLTAREN    100 g    Apply 2-4 grams to affected area(s) up to 4  times per day as needed. This is an anti-inflammatory medication.    Polyarthralgia       dicyclomine 20 MG tablet    BENTYL    120 tablet    Take 1 tablet (20 mg) by mouth 4 times daily as needed    Abdominal cramping       diltiazem 2% in PLO cream (FV COMPOUNDED) 2% Gel     30 g    Apply small pea size amount three times daily to anus until pain is gone.    Anal fissure       diphenhydrAMINE 25 MG tablet    BENADRYL    30 tablet    Take 1 tablet (25 mg) by mouth every 8 hours as needed for allergies        EPINEPHrine 0.3 MG/0.3ML injection 2-pack    EPIPEN 2-EAMON    0.6 mL    Inject 0.3 mLs (0.3 mg) into the muscle as needed for anaphylaxis    Hx of bee sting allergy       * guanFACINE 1 MG tablet    TENEX    270 tablet    Take 3 tablets (3 mg) by mouth 2 times daily    Tic       * guanFACINE 1 MG tablet    TENEX    180 tablet    Take 3 tablets (3 mg) by mouth twice daily morning and evening.    Tic       hydrOXYzine 25 MG tablet    ATARAX    90 tablet    Take 1-2 tablets (25-50 mg) by mouth every 6 hours as needed for anxiety    TAHIR (generalized anxiety disorder)       hyoscyamine 0.125 MG tablet    ANASPAZ/LEVSIN    40 tablet    Take 1-2 tablets (125-250 mcg) by mouth every 4 hours as needed for cramping    Abdominal pain, generalized       hypromellose 0.3 % Soln ophthalmic solution    GENTEAL     1 drop every hour as needed for dry eyes        LACTAID PO      Take by mouth daily        lactulose 20 GM/30ML Soln     300 mL    Take 30 mLs by mouth 3 times daily as needed    Constipation, unspecified constipation type       lidocaine 2 % topical gel    XYLOCAINE     Apply 1 Tube topically daily Reported on 4/21/2017        lidocaine visc 2% 2.5mL/5mL & maalox/mylanta w/ simeth 2.5mL/5mL & diphenhydrAMINE 5mg/5mL Susp suspension    [facility]    1 Bottle    Swish and swallow 10 mLs in mouth every 6 hours as needed for mouth sores    Behcet's syndrome (H)       linaclotide 145 MCG capsule    LINZESS     90 capsule    Take 1 capsule (145 mcg) by mouth every morning (before breakfast)    Chronic idiopathic constipation       LORazepam 1 MG tablet    ATIVAN    60 tablet    Take 0.5 tablets (0.5 mg) by mouth 2 times daily as needed for other (atypical chest pain) Do not operate a vehicle after taking this medication    Chronic abdominal pain       lubiprostone 24 MCG capsule    AMITIZA    30 capsule    Take 1 capsule (24 mcg) by mouth 2 times daily (with meals)    Chronic idiopathic constipation       * norgestimate-ethinyl estradiol 0.25-35 MG-MCG per tablet    SPRINTEC 28    84 tablet    Take 1 tablet by mouth daily    General counseling for prescription of oral contraceptives       * SPRINTEC 28 0.25-35 MG-MCG per tablet   Generic drug:  norgestimate-ethinyl estradiol     84 tablet    1 by mouth daily    General counseling for prescription of oral contraceptives       omeprazole 40 MG capsule    priLOSEC    90 capsule    Take 1 capsule (40 mg) by mouth daily    Gastroesophageal reflux disease, esophagitis presence not specified       ondansetron 8 MG tablet    ZOFRAN    60 tablet    Take 1 tablet (8 mg) by mouth every 8 hours as needed for nausea    Nausea       sucralfate 1 GM/10ML suspension    CARAFATE    1200 mL    Take 10 mLs (1 g) by mouth 4 times daily    Bile reflux gastritis, Nausea       triamcinolone 0.1 % cream    KENALOG    15 g    Apply sparingly to oral ulcers three times daily for 14 days as needed.    Behcet's syndrome (H)       * Notice:  This list has 8 medication(s) that are the same as other medications prescribed for you. Read the directions carefully, and ask your doctor or other care provider to review them with you.

## 2018-05-08 NOTE — LETTER
"5/8/2018    Sonja Winchester Brady, APRN CNP  [address]  [facility] [address]    RE: Samara Oropeza       Dear Colleague,    I had the pleasure of seeing Samara Oropeza in the [facility].    [id number]    Electrophysiology/ Clinic Note         H&P and Plan:         Korey David MD    Physical Exam:  Vitals: /80 (BP Location: Right arm, Patient Position: Chair, Cuff Size: Adult Regular)  Pulse 97  Ht 1.588 m (5' 2.5\")  Wt 67 kg (147 lb 9.6 oz)  SpO2 99%  BMI 26.57 kg/m2    Constitutional:  AAO x3.  Pt is in NAD.  HEAD: normocephalic.  SKIN: Skin normal color, texture and turgor with no lesions or eruptions.  Eyes: PERRL, EOMI.  ENT:  Supple, normal JVP. No lymphadenopathy or thyroid enlargement.  Chest:  CTAB.  Cardiac:    RRR, normal  S1 and S2.  No murmurs rubs or gallop.    Abdomen:  Normal BS.  Soft, non-tender and non-distended.  No rebound or guarding.    Extremities:  Pedious pulses palpable B/L.  No LE edema noticed.   Neurological: Strength and sensation grossly symmetric and intact throughout.       CURRENT MEDICATIONS:  Current Outpatient Prescriptions   Medication Sig Dispense Refill     albuterol (PROAIR HFA/PROVENTIL HFA/VENTOLIN HFA) 108 (90 BASE) MCG/ACT Inhaler Inhale 2 puffs into the lungs every 6 hours as needed for shortness of breath / dyspnea or wheezing 1 Inhaler 1     amitriptyline (ELAVIL) 50 MG tablet Take 1 tablet (50 mg) by mouth At Bedtime 30 tablet 11     apremilast (OTEZLA) 30 MG tablet Take 1 tablet (30 mg) by mouth 2 times daily 180 tablet 0     artificial tears OINT ophthalmic ointment 0.5 inch strip each eye at night 1 Tube 11     aspirin (ASPIRIN CHILDRENS) 81 MG chewable tablet Take 81 mg by mouth as needed        aspirin-acetaminophen-caffeine (EXCEDRIN MIGRAINE) 250-250-65 MG per tablet Take 1 tablet by mouth every 6 hours as needed for headaches       benzocaine (TOPICALE XTRA) 20 % GEL Apply as needed locally to mouth " or nasal ulcers for pain; 4 times daily as needed 30 g 1     betamethasone valerate (VALISONE) 0.1 % cream Apply topically 2 times daily       botulinum toxin type A (BOTOX) 100 UNITS injection Inject 200 Units into the muscle every 3 months 200 Units 3     carbamide peroxide (DEBROX) 6.5 % otic solution 5 drops 2 times daily       clobetasol (TEMOVATE) 0.05 % cream Apply topically 2 times daily 60 g 0     colchicine 0.6 MG tablet 0.6mg twice daily X 2 weeks and then 0.6mg daily X 2 weeks and then stop. 45 tablet 0     COMPOUNDED NON-CONTROLLED SUBSTANCE (CMPD RX) - PHARMACY TO MIX COMPOUNDED MEDICATION Take one capsule in the morning 30 capsule 0     cyproheptadine (PERIACTIN) 4 MG tablet Take 1 tablet (4 mg) by mouth At Bedtime For nightmares 30 tablet 2     diclofenac (VOLTAREN) 1 % GEL topical gel Apply 2-4 grams to affected area(s) up to 4 times per day as needed. This is an anti-inflammatory medication. 100 g 4     dicyclomine (BENTYL) 20 MG tablet Take 1 tablet (20 mg) by mouth 4 times daily as needed 120 tablet 3     diltiazem 2% in PLO cream, FV COMPOUNDED, 2% GEL Apply small pea size amount three times daily to anus until pain is gone. 30 g 1     diphenhydrAMINE (BENADRYL) 25 MG tablet Take 1 tablet (25 mg) by mouth every 8 hours as needed for allergies 30 tablet 0     EPINEPHrine (EPIPEN 2-EAMON) 0.3 MG/0.3ML injection Inject 0.3 mLs (0.3 mg) into the muscle as needed for anaphylaxis 0.6 mL 3     guanFACINE (TENEX) 1 MG tablet Take 3 tablets (3 mg) by mouth 2 times daily 270 tablet 3     guanFACINE (TENEX) 1 MG tablet Take 3 tablets (3 mg) by mouth twice daily morning and evening. 180 tablet 1     hydrOXYzine (ATARAX) 25 MG tablet Take 1-2 tablets (25-50 mg) by mouth every 6 hours as needed for anxiety 90 tablet 2     hyoscyamine (ANASPAZ/LEVSIN) 0.125 MG tablet Take 1-2 tablets (125-250 mcg) by mouth every 4 hours as needed for cramping 40 tablet 1     hypromellose (GENTEAL) 0.3 % SOLN 1 drop every hour as  needed for dry eyes        Lactase (LACTAID PO) Take by mouth daily       lactulose 20 GM/30ML SOLN Take 30 mLs by mouth 3 times daily as needed 300 mL 3     linaclotide (LINZESS) 145 MCG capsule Take 1 capsule (145 mcg) by mouth every morning (before breakfast) 90 capsule 1     LORazepam (ATIVAN) 1 MG tablet Take 0.5 tablets (0.5 mg) by mouth 2 times daily as needed for other (atypical chest pain) Do not operate a vehicle after taking this medication 60 tablet 0     Magic Mouthwash (FV std formula) lidocaine visc 2% 2.5mL/5mL & maalox/mylanta w/ simeth 2.5mL/5mL & diphenhydrAMINE 5mg/5mL Swish and swallow 10 mLs in mouth every 6 hours as needed for mouth sores 1 Bottle 1     norgestimate-ethinyl estradiol (SPRINTEC 28) 0.25-35 MG-MCG per tablet Take 1 tablet by mouth daily 84 tablet 4     omeprazole (PRILOSEC) 40 MG capsule Take 1 capsule (40 mg) by mouth daily 90 capsule 3     OnabotulinumtoxinA (BOTOX IJ) Inject 165 Units into the muscle once Lot /C3  Exp 06/2020       OnabotulinumtoxinA (BOTOX IJ) Inject 165 Units as directed once Lot#: /C3  Exp: 10/2020       OnabotulinumtoxinA (BOTOX IJ) Inject 165 Units as directed once Lot # /C3   Exp:  10/2020       ondansetron (ZOFRAN) 8 MG tablet Take 1 tablet (8 mg) by mouth every 8 hours as needed for nausea 60 tablet 1     SPRINTEC 28 0.25-35 MG-MCG per tablet 1 by mouth daily 84 tablet 4     sucralfate (CARAFATE) 1 GM/10ML suspension Take 10 mLs (1 g) by mouth 4 times daily 1200 mL 2     triamcinolone (KENALOG) 0.1 % cream Apply sparingly to oral ulcers three times daily for 14 days as needed. 15 g 1     lidocaine (XYLOCAINE) 2 % jelly Apply 1 Tube topically daily Reported on 4/21/2017       lubiprostone (AMITIZA) 24 MCG capsule Take 1 capsule (24 mcg) by mouth 2 times daily (with meals) (Patient not taking: Reported on 5/8/2018) 30 capsule 1       ALLERGIES     Allergies   Allergen Reactions     Amoxil [Penicillins] Rash     Dad unsure of reaction.      Bee Venom Anaphylaxis     Contrast Dye Rash     Contrast Media Ready-Box [facility], 04/09/2014.; Contrast Media Ready-Box [facility], 04/09/2014.  NOTE: this is a contrast media oral with iodine. Premedicate with methylpred standard for IV contrast, request barium contrast for oral contrast.     Kiwi Swelling     Orange Fruit [Citrus] Anaphylaxis     Pineapple Anaphylaxis, Difficulty breathing and Rash     Reglan [Metoclopramide] Other (See Comments)     IV dose only, in ER, rapid heart rate.     Ace Inhibitors      Difficulty in breathing and GI upset     Amitiza [Lubiprostone] Nausea and Vomiting     Amoxicillin-Pot Clavulanate      Latex      Midazolam Unknown     Other reaction(s): Unknown  parent states that when pt takes this medication, she wakes up being very violent .  parent states that when pt takes this medication, she wakes up being very violent .     No Clinical Screening - See Comments      Bleech/ chest tightness, itchy throat, swollen tongue, hives     Versed      Coming out of pelvic exam at age of 5, was kicking and screaming when coming out of the versed.     Adhesive Tape Rash     Azithromycin Hives and Rash     Cephalexin Itching and Rash     Itchy mouth  Itchy mouth     Keflex [Cephalexin-Fd&C Yellow #6] Hives       PAST MEDICAL HISTORY:  Past Medical History:   Diagnosis Date     Anxiety      Arthritis      Behcet's disease (H)      Cervical adenitis May 2010     Chronic abdominal pain      Constipation, chronic 1994     Gastro-oesophageal reflux disease      Gastroparesis      Migraines      Neuromuscular disorder (H)     fibramyalgia     Palpitations      Seizure (H)      Seizures (H)     unknown etiology     Syncope      Tourette's        PAST SURGICAL HISTORY:  Past Surgical History:   Procedure Laterality Date     ARTHROSCOPY KNEE WITH PATELLAR REALIGNMENT  7/25/2013    Procedure: ARTHROSCOPY KNEE WITH PATELLAR REALIGNMENT;  Left Knee Arthroscopy, Medial Patellofemoral Ligament Reconstruction with  "Allograft  ;  Surgeon: Jennifer Acevedo MD;  Location: [facility]     COLONOSCOPY  2015     DENTAL SURGERY  1996    Teeth removal     ENDOSCOPY UPPER, COLONOSCOPY, COMBINED  2005     HC ESOPH/GAS REFLUX TEST W NASAL IMPED >1 HR N/A 2/15/2017    Procedure: ESOPHAGEAL IMPEDENCE FUNCTION TEST WITH 24 HOUR PH GREATER THAN 1 HOUR;  Surgeon: Timothy Matta MD;  Location: [facility]       FAMILY HISTORY:  Family History   Problem Relation Age of Onset     Depression Mother      Neurologic Disorder Mother      Migraines, take imitrex injection.  Also in maternal grandmother.       Alcohol/Drug Father      Hypertension Father      Depression Father      Cardiovascular Maternal Grandmother      Depression Maternal Grandmother      Hypertension Maternal Grandmother      Alzheimer Disease Maternal Grandmother      Cardiovascular Maternal Grandfather      Hypertension Maternal Grandfather      Depression Maternal Grandfather      Alcohol/Drug Maternal Grandfather      Cardiovascular Paternal Grandmother      Hypertension Paternal Grandmother      Cardiovascular Paternal Grandfather      Hypertension Paternal Grandfather      Glaucoma No family hx of      Macular Degeneration No family hx of        SOCIAL HISTORY:  Social History     Social History     Marital status: Single     Spouse name: N/A     Number of children: N/A     Years of education: N/A     Social History Main Topics     Smoking status: Never Smoker     Smokeless tobacco: Never Used     Alcohol use 0.6 oz/week     1 Standard drinks or equivalent per week      Comment: rarely     Drug use: No     Sexual activity: Yes     Partners: Male     Birth control/ protection: Pill     Other Topics Concern     None     Social History Narrative    Boyfriend of 5 years, living with \"in laws\" Oct 2017 and looking forward to their own apartment.        Review of Systems:  Skin:  Negative     Eyes:  Negative    ENT:  Positive for epistaxis  Respiratory:  Positive for shortness of " breath;dyspnea on exertion;dyspnea at rest  Cardiovascular:    Positive for;palpitations;chest pain;heaviness;edema;syncope or near-syncope;fatigue;lightheadedness;dizziness  Gastroenterology: Positive for nausea;vomiting;heartburn;reflux  Genitourinary:  Negative    Musculoskeletal:  Positive for nocturnal cramping  Neurologic:  Positive for headaches;migraine headaches;numbness or tingling of hands;numbness or tingling of feet  Psychiatric:  Negative    Heme/Lymph/Imm:  Negative    Endocrine:  Negative        Recent Lab Results:  LIPID RESULTS:  No results found for: CHOL, HDL, LDL, TRIG, CHOLHDLRATIO    LIVER ENZYME RESULTS:  Lab Results   Component Value Date    AST 26 04/18/2018    ALT 28 04/18/2018       CBC RESULTS:  Lab Results   Component Value Date    WBC 5.0 04/18/2018    RBC 4.19 04/18/2018    HGB 11.7 04/18/2018    HCT 36.6 04/18/2018    MCV 87 04/18/2018    MCH 27.9 04/18/2018    MCHC 32.0 04/18/2018    RDW 14.2 04/18/2018     04/18/2018       BMP RESULTS:  Lab Results   Component Value Date     04/18/2018    POTASSIUM 3.9 04/18/2018    CHLORIDE 108 04/18/2018    CO2 24 04/18/2018    ANIONGAP 10 04/18/2018    GLC 70 04/18/2018    BUN 11 04/18/2018    CR 0.77 04/18/2018    GFRESTIMATED >90 04/18/2018    GFRESTBLACK >90 04/18/2018    SHANKAR 9.0 04/18/2018        A1C RESULTS:  No results found for: A1C    INR RESULTS:  Lab Results   Component Value Date    INR 0.99 11/06/2016    INR 1.03 05/06/2015         ECHOCARDIOGRAM  No results found for this or any previous visit (from the past 8760 hour(s)).      Orders Placed This Encounter   Procedures     EKG 12-lead complete w/read - Clinics (performed today)     No orders of the defined types were placed in this encounter.    There are no discontinued medications.      Encounter Diagnosis   Name Primary?     Palpitations Yes         CC  Sonja Abreu, APRN CNP  [address]  [place] [address]                Thank you for allowing  me to participate in the care of your patient.      Sincerely,     Korey David MD     [facility]    cc:   Sonja Abreu, APRN CNP  [address] [facility] [address]  [place] [address]

## 2018-05-08 NOTE — LETTER
5/8/2018      Sonja Abreu, APRN CNP  [address]  [facility] [address]      RE: Samara Oropeza       Dear Colleague,    I had the pleasure of seeing Samara Oropeza in the [facility].    Service Date: 05/08/2018      REASON FOR VISIT:  Evaluation of syncope.      HISTORY OF PRESENT ILLNESS:  Ms. Oropeza is a pleasant 22-year-old lady with a history of Behcet's disease, rheumatoid arthritis, fibromyalgia and psychosocial issues who presents for frequent episodes of syncope and is here for a second opinion regarding management of syncope.      The patient has a long history of syncopes back in 2015.  She has seen several of my colleagues including Dr. Chilel, Dr. Benítez and most recently Dr. Sosa in 04/2018.  Apparently syncopal episodes  seem to be preceded by a sensation of warmth and diaphoresis.  In 2017 she had a tilt table test which was suggestive of vasovagal response.  She has had an extensive workup including an echocardiogram in 2015 which was essentially normal and a cardiac MRI as well which was essentially normal.        She saw my colleague, Dr. Sosa, who explained her well the fact that some of her symptoms are related to vasovagal, and definitely there is a psychological component to her symptoms as well.  She wore a 30-day event monitor which she was not able to finish as she had problems due to sensitive skin.  However, monitor was essentially negative, only showing sinus tachycardia and episodes of PACs and PVCs.      At the moment, she is doing well.  She informs she has not had any episodes of syncope since January.  She denies any other symptoms such as chest pain or shortness of breath.  EKG obtained today revealed normal sinus rhythm.      ASSESSMENT AND PLAN:  Syncope.  Unclear etiology, most likely multifactorial with a component of vasovagal syncope as well as psychological component.  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      During this visit, we discussed again management of vasovagal phenomena.  I encouraged increased salt intake and hydration as well as increased physical activity.  We discussed the possibility of medications to prevent symptoms.  However, at this time I do not think it is necessary.  She was encouraged to contact us if her symptoms get worse.         ARTIE IQBAL MD             D: 2018   T: 2018   MT: BILLY      Name:     LUCINA BAH   MRN:      [id number]        Account:      [id number]   :      1995           Service Date: 2018      Document: [id number]         Outpatient Encounter Prescriptions as of 2018   Medication Sig Dispense Refill     albuterol (PROAIR HFA/PROVENTIL HFA/VENTOLIN HFA) 108 (90 BASE) MCG/ACT Inhaler Inhale 2 puffs into the lungs every 6 hours as needed for shortness of breath / dyspnea or wheezing 1 Inhaler 1     amitriptyline (ELAVIL) 50 MG tablet Take 1 tablet (50 mg) by mouth At Bedtime 30 tablet 11     apremilast (OTEZLA) 30 MG tablet Take 1 tablet (30 mg) by mouth 2 times daily 180 tablet 0     artificial tears OINT ophthalmic ointment 0.5 inch strip each eye at night 1 Tube 11     aspirin (ASPIRIN CHILDRENS) 81 MG chewable tablet Take 81 mg by mouth as needed        aspirin-acetaminophen-caffeine (EXCEDRIN MIGRAINE) 250-250-65 MG per tablet Take 1 tablet by mouth every 6 hours as needed for headaches       benzocaine (TOPICALE XTRA) 20 % GEL Apply as needed locally to mouth or nasal ulcers for pain; 4 times daily as needed 30 g 1     betamethasone valerate (VALISONE) 0.1 % cream Apply topically 2 times daily       botulinum toxin type A (BOTOX) 100 UNITS injection Inject 200 Units into the muscle every 3 months 200 Units 3     carbamide peroxide (DEBROX) 6.5 % otic solution 5 drops 2 times daily       clobetasol (TEMOVATE) 0.05 % cream Apply topically 2 times daily 60 g 0      colchicine 0.6 MG tablet 0.6mg twice daily X 2 weeks and then 0.6mg daily X 2 weeks and then stop. 45 tablet 0     COMPOUNDED NON-CONTROLLED SUBSTANCE (CMPD RX) - PHARMACY TO MIX COMPOUNDED MEDICATION Take one capsule in the morning 30 capsule 0     cyproheptadine (PERIACTIN) 4 MG tablet Take 1 tablet (4 mg) by mouth At Bedtime For nightmares 30 tablet 2     diclofenac (VOLTAREN) 1 % GEL topical gel Apply 2-4 grams to affected area(s) up to 4 times per day as needed. This is an anti-inflammatory medication. 100 g 4     dicyclomine (BENTYL) 20 MG tablet Take 1 tablet (20 mg) by mouth 4 times daily as needed 120 tablet 3     diltiazem 2% in PLO cream, FV COMPOUNDED, 2% GEL Apply small pea size amount three times daily to anus until pain is gone. 30 g 1     diphenhydrAMINE (BENADRYL) 25 MG tablet Take 1 tablet (25 mg) by mouth every 8 hours as needed for allergies 30 tablet 0     EPINEPHrine (EPIPEN 2-EAMON) 0.3 MG/0.3ML injection Inject 0.3 mLs (0.3 mg) into the muscle as needed for anaphylaxis 0.6 mL 3     guanFACINE (TENEX) 1 MG tablet Take 3 tablets (3 mg) by mouth 2 times daily 270 tablet 3     guanFACINE (TENEX) 1 MG tablet Take 3 tablets (3 mg) by mouth twice daily morning and evening. 180 tablet 1     hydrOXYzine (ATARAX) 25 MG tablet Take 1-2 tablets (25-50 mg) by mouth every 6 hours as needed for anxiety 90 tablet 2     hyoscyamine (ANASPAZ/LEVSIN) 0.125 MG tablet Take 1-2 tablets (125-250 mcg) by mouth every 4 hours as needed for cramping 40 tablet 1     hypromellose (GENTEAL) 0.3 % SOLN 1 drop every hour as needed for dry eyes        Lactase (LACTAID PO) Take by mouth daily       lactulose 20 GM/30ML SOLN Take 30 mLs by mouth 3 times daily as needed 300 mL 3     linaclotide (LINZESS) 145 MCG capsule Take 1 capsule (145 mcg) by mouth every morning (before breakfast) 90 capsule 1     LORazepam (ATIVAN) 1 MG tablet Take 0.5 tablets (0.5 mg) by mouth 2 times daily as needed for other (atypical chest pain) Do not  operate a vehicle after taking this medication 60 tablet 0     Magic Mouthwash (FV std formula) lidocaine visc 2% 2.5mL/5mL & maalox/mylanta w/ simeth 2.5mL/5mL & diphenhydrAMINE 5mg/5mL Swish and swallow 10 mLs in mouth every 6 hours as needed for mouth sores 1 Bottle 1     norgestimate-ethinyl estradiol (SPRINTEC 28) 0.25-35 MG-MCG per tablet Take 1 tablet by mouth daily 84 tablet 4     omeprazole (PRILOSEC) 40 MG capsule Take 1 capsule (40 mg) by mouth daily 90 capsule 3     OnabotulinumtoxinA (BOTOX IJ) Inject 165 Units into the muscle once Lot /C3  Exp 06/2020       OnabotulinumtoxinA (BOTOX IJ) Inject 165 Units as directed once Lot#: /C3  Exp: 10/2020       OnabotulinumtoxinA (BOTOX IJ) Inject 165 Units as directed once Lot # /C3   Exp:  10/2020       ondansetron (ZOFRAN) 8 MG tablet Take 1 tablet (8 mg) by mouth every 8 hours as needed for nausea 60 tablet 1     SPRINTEC 28 0.25-35 MG-MCG per tablet 1 by mouth daily 84 tablet 4     sucralfate (CARAFATE) 1 GM/10ML suspension Take 10 mLs (1 g) by mouth 4 times daily 1200 mL 2     triamcinolone (KENALOG) 0.1 % cream Apply sparingly to oral ulcers three times daily for 14 days as needed. 15 g 1     lidocaine (XYLOCAINE) 2 % jelly Apply 1 Tube topically daily Reported on 4/21/2017       lubiprostone (AMITIZA) 24 MCG capsule Take 1 capsule (24 mcg) by mouth 2 times daily (with meals) (Patient not taking: Reported on 5/8/2018) 30 capsule 1     No facility-administered encounter medications on file as of 5/8/2018.        Again, thank you for allowing me to participate in the care of your patient.      Sincerely,    Korey David MD     [facility]

## 2018-05-08 NOTE — PROGRESS NOTES
"[id number]    Electrophysiology/ Clinic Note         H&P and Plan:         Korey David MD    Physical Exam:  Vitals: /80 (BP Location: Right arm, Patient Position: Chair, Cuff Size: Adult Regular)  Pulse 97  Ht 1.588 m (5' 2.5\")  Wt 67 kg (147 lb 9.6 oz)  SpO2 99%  BMI 26.57 kg/m2    Constitutional:  AAO x3.  Pt is in NAD.  HEAD: normocephalic.  SKIN: Skin normal color, texture and turgor with no lesions or eruptions.  Eyes: PERRL, EOMI.  ENT:  Supple, normal JVP. No lymphadenopathy or thyroid enlargement.  Chest:  CTAB.  Cardiac:    RRR, normal  S1 and S2.  No murmurs rubs or gallop.    Abdomen:  Normal BS.  Soft, non-tender and non-distended.  No rebound or guarding.    Extremities:  Pedious pulses palpable B/L.  No LE edema noticed.   Neurological: Strength and sensation grossly symmetric and intact throughout.       CURRENT MEDICATIONS:  Current Outpatient Prescriptions   Medication Sig Dispense Refill     albuterol (PROAIR HFA/PROVENTIL HFA/VENTOLIN HFA) 108 (90 BASE) MCG/ACT Inhaler Inhale 2 puffs into the lungs every 6 hours as needed for shortness of breath / dyspnea or wheezing 1 Inhaler 1     amitriptyline (ELAVIL) 50 MG tablet Take 1 tablet (50 mg) by mouth At Bedtime 30 tablet 11     apremilast (OTEZLA) 30 MG tablet Take 1 tablet (30 mg) by mouth 2 times daily 180 tablet 0     artificial tears OINT ophthalmic ointment 0.5 inch strip each eye at night 1 Tube 11     aspirin (ASPIRIN CHILDRENS) 81 MG chewable tablet Take 81 mg by mouth as needed        aspirin-acetaminophen-caffeine (EXCEDRIN MIGRAINE) 250-250-65 MG per tablet Take 1 tablet by mouth every 6 hours as needed for headaches       benzocaine (TOPICALE XTRA) 20 % GEL Apply as needed locally to mouth or nasal ulcers for pain; 4 times daily as needed 30 g 1     betamethasone valerate (VALISONE) 0.1 % cream Apply topically 2 times daily       botulinum toxin type A (BOTOX) 100 UNITS injection Inject 200 Units into the muscle every 3 months " 200 Units 3     carbamide peroxide (DEBROX) 6.5 % otic solution 5 drops 2 times daily       clobetasol (TEMOVATE) 0.05 % cream Apply topically 2 times daily 60 g 0     colchicine 0.6 MG tablet 0.6mg twice daily X 2 weeks and then 0.6mg daily X 2 weeks and then stop. 45 tablet 0     COMPOUNDED NON-CONTROLLED SUBSTANCE (CMPD RX) - PHARMACY TO MIX COMPOUNDED MEDICATION Take one capsule in the morning 30 capsule 0     cyproheptadine (PERIACTIN) 4 MG tablet Take 1 tablet (4 mg) by mouth At Bedtime For nightmares 30 tablet 2     diclofenac (VOLTAREN) 1 % GEL topical gel Apply 2-4 grams to affected area(s) up to 4 times per day as needed. This is an anti-inflammatory medication. 100 g 4     dicyclomine (BENTYL) 20 MG tablet Take 1 tablet (20 mg) by mouth 4 times daily as needed 120 tablet 3     diltiazem 2% in PLO cream, FV COMPOUNDED, 2% GEL Apply small pea size amount three times daily to anus until pain is gone. 30 g 1     diphenhydrAMINE (BENADRYL) 25 MG tablet Take 1 tablet (25 mg) by mouth every 8 hours as needed for allergies 30 tablet 0     EPINEPHrine (EPIPEN 2-EAMON) 0.3 MG/0.3ML injection Inject 0.3 mLs (0.3 mg) into the muscle as needed for anaphylaxis 0.6 mL 3     guanFACINE (TENEX) 1 MG tablet Take 3 tablets (3 mg) by mouth 2 times daily 270 tablet 3     guanFACINE (TENEX) 1 MG tablet Take 3 tablets (3 mg) by mouth twice daily morning and evening. 180 tablet 1     hydrOXYzine (ATARAX) 25 MG tablet Take 1-2 tablets (25-50 mg) by mouth every 6 hours as needed for anxiety 90 tablet 2     hyoscyamine (ANASPAZ/LEVSIN) 0.125 MG tablet Take 1-2 tablets (125-250 mcg) by mouth every 4 hours as needed for cramping 40 tablet 1     hypromellose (GENTEAL) 0.3 % SOLN 1 drop every hour as needed for dry eyes        Lactase (LACTAID PO) Take by mouth daily       lactulose 20 GM/30ML SOLN Take 30 mLs by mouth 3 times daily as needed 300 mL 3     linaclotide (LINZESS) 145 MCG capsule Take 1 capsule (145 mcg) by mouth every  morning (before breakfast) 90 capsule 1     LORazepam (ATIVAN) 1 MG tablet Take 0.5 tablets (0.5 mg) by mouth 2 times daily as needed for other (atypical chest pain) Do not operate a vehicle after taking this medication 60 tablet 0     Magic Mouthwash (FV std formula) lidocaine visc 2% 2.5mL/5mL & maalox/mylanta w/ simeth 2.5mL/5mL & diphenhydrAMINE 5mg/5mL Swish and swallow 10 mLs in mouth every 6 hours as needed for mouth sores 1 Bottle 1     norgestimate-ethinyl estradiol (SPRINTEC 28) 0.25-35 MG-MCG per tablet Take 1 tablet by mouth daily 84 tablet 4     omeprazole (PRILOSEC) 40 MG capsule Take 1 capsule (40 mg) by mouth daily 90 capsule 3     OnabotulinumtoxinA (BOTOX IJ) Inject 165 Units into the muscle once Lot /C3  Exp 06/2020       OnabotulinumtoxinA (BOTOX IJ) Inject 165 Units as directed once Lot#: /C3  Exp: 10/2020       OnabotulinumtoxinA (BOTOX IJ) Inject 165 Units as directed once Lot # /C3   Exp:  10/2020       ondansetron (ZOFRAN) 8 MG tablet Take 1 tablet (8 mg) by mouth every 8 hours as needed for nausea 60 tablet 1     SPRINTEC 28 0.25-35 MG-MCG per tablet 1 by mouth daily 84 tablet 4     sucralfate (CARAFATE) 1 GM/10ML suspension Take 10 mLs (1 g) by mouth 4 times daily 1200 mL 2     triamcinolone (KENALOG) 0.1 % cream Apply sparingly to oral ulcers three times daily for 14 days as needed. 15 g 1     lidocaine (XYLOCAINE) 2 % jelly Apply 1 Tube topically daily Reported on 4/21/2017       lubiprostone (AMITIZA) 24 MCG capsule Take 1 capsule (24 mcg) by mouth 2 times daily (with meals) (Patient not taking: Reported on 5/8/2018) 30 capsule 1       ALLERGIES     Allergies   Allergen Reactions     Amoxil [Penicillins] Rash     Dad unsure of reaction.     Bee Venom Anaphylaxis     Contrast Dye Rash     Contrast Media [facility] [place], 04/09/2014.; Contrast [facility] [place], 04/09/2014.  NOTE: this is a contrast media oral with iodine. Premedicate with methylpred standard for IV  contrast, request barium contrast for oral contrast.     Kiwi Swelling     Orange Fruit [Citrus] Anaphylaxis     Pineapple Anaphylaxis, Difficulty breathing and Rash     Reglan [Metoclopramide] Other (See Comments)     IV dose only, in ER, rapid heart rate.     Ace Inhibitors      Difficulty in breathing and GI upset     Amitiza [Lubiprostone] Nausea and Vomiting     Amoxicillin-Pot Clavulanate      Latex      Midazolam Unknown     Other reaction(s): Unknown  parent states that when pt takes this medication, she wakes up being very violent .  parent states that when pt takes this medication, she wakes up being very violent .     No Clinical Screening - See Comments      Bleech/ chest tightness, itchy throat, swollen tongue, hives     Versed      Coming out of pelvic exam at age of 5, was kicking and screaming when coming out of the versed.     Adhesive Tape Rash     Azithromycin Hives and Rash     Cephalexin Itching and Rash     Itchy mouth  Itchy mouth     Keflex [Cephalexin-Fd&C Yellow #6] Hives       PAST MEDICAL HISTORY:  Past Medical History:   Diagnosis Date     Anxiety      Arthritis      Behcet's disease (H)      Cervical adenitis May 2010     Chronic abdominal pain      Constipation, chronic 1994     Gastro-oesophageal reflux disease      Gastroparesis      Migraines      Neuromuscular disorder (H)     fibramyalgia     Palpitations      Seizure (H)      Seizures (H)     unknown etiology     Syncope      Tourette's        PAST SURGICAL HISTORY:  Past Surgical History:   Procedure Laterality Date     ARTHROSCOPY KNEE WITH PATELLAR REALIGNMENT  7/25/2013    Procedure: ARTHROSCOPY KNEE WITH PATELLAR REALIGNMENT;  Left Knee Arthroscopy, Medial Patellofemoral Ligament Reconstruction with Allograft  ;  Surgeon: Jennifer Acevedo MD;  Location[facility]     COLONOSCOPY  2015     DENTAL SURGERY  1996    Teeth removal     ENDOSCOPY UPPER, COLONOSCOPY, COMBINED  2005      ESOPH/GAS REFLUX TEST W NASAL IMPED >1 HR  "N/A 2/15/2017    Procedure: ESOPHAGEAL IMPEDENCE FUNCTION TEST WITH 24 HOUR PH GREATER THAN 1 HOUR;  Surgeon: Timothy Matta MD;  Location:  GI       FAMILY HISTORY:  Family History   Problem Relation Age of Onset     Depression Mother      Neurologic Disorder Mother      Migraines, take imitrex injection.  Also in maternal grandmother.       Alcohol/Drug Father      Hypertension Father      Depression Father      Cardiovascular Maternal Grandmother      Depression Maternal Grandmother      Hypertension Maternal Grandmother      Alzheimer Disease Maternal Grandmother      Cardiovascular Maternal Grandfather      Hypertension Maternal Grandfather      Depression Maternal Grandfather      Alcohol/Drug Maternal Grandfather      Cardiovascular Paternal Grandmother      Hypertension Paternal Grandmother      Cardiovascular Paternal Grandfather      Hypertension Paternal Grandfather      Glaucoma No family hx of      Macular Degeneration No family hx of        SOCIAL HISTORY:  Social History     Social History     Marital status: Single     Spouse name: N/A     Number of children: N/A     Years of education: N/A     Social History Main Topics     Smoking status: Never Smoker     Smokeless tobacco: Never Used     Alcohol use 0.6 oz/week     1 Standard drinks or equivalent per week      Comment: rarely     Drug use: No     Sexual activity: Yes     Partners: Male     Birth control/ protection: Pill     Other Topics Concern     None     Social History Narrative    Boyfriend of 5 years, living with \"in laws\" Oct 2017 and looking forward to their own apartment.        Review of Systems:  Skin:  Negative     Eyes:  Negative    ENT:  Positive for epistaxis  Respiratory:  Positive for shortness of breath;dyspnea on exertion;dyspnea at rest  Cardiovascular:    Positive for;palpitations;chest pain;heaviness;edema;syncope or near-syncope;fatigue;lightheadedness;dizziness  Gastroenterology: Positive for " nausea;vomiting;heartburn;reflux  Genitourinary:  Negative    Musculoskeletal:  Positive for nocturnal cramping  Neurologic:  Positive for headaches;migraine headaches;numbness or tingling of hands;numbness or tingling of feet  Psychiatric:  Negative    Heme/Lymph/Imm:  Negative    Endocrine:  Negative        Recent Lab Results:  LIPID RESULTS:  No results found for: CHOL, HDL, LDL, TRIG, CHOLHDLRATIO    LIVER ENZYME RESULTS:  Lab Results   Component Value Date    AST 26 04/18/2018    ALT 28 04/18/2018       CBC RESULTS:  Lab Results   Component Value Date    WBC 5.0 04/18/2018    RBC 4.19 04/18/2018    HGB 11.7 04/18/2018    HCT 36.6 04/18/2018    MCV 87 04/18/2018    MCH 27.9 04/18/2018    MCHC 32.0 04/18/2018    RDW 14.2 04/18/2018     04/18/2018       BMP RESULTS:  Lab Results   Component Value Date     04/18/2018    POTASSIUM 3.9 04/18/2018    CHLORIDE 108 04/18/2018    CO2 24 04/18/2018    ANIONGAP 10 04/18/2018    GLC 70 04/18/2018    BUN 11 04/18/2018    CR 0.77 04/18/2018    GFRESTIMATED >90 04/18/2018    GFRESTBLACK >90 04/18/2018    SHANKAR 9.0 04/18/2018        A1C RESULTS:  No results found for: A1C    INR RESULTS:  Lab Results   Component Value Date    INR 0.99 11/06/2016    INR 1.03 05/06/2015         ECHOCARDIOGRAM  No results found for this or any previous visit (from the past 8760 hour(s)).      Orders Placed This Encounter   Procedures     EKG 12-lead complete w/read - Clinics (performed today)     No orders of the defined types were placed in this encounter.    There are no discontinued medications.      Encounter Diagnosis   Name Primary?     Palpitations Yes         CC  Sonja Abreu, APRN CNP  [address]  [place] [address]

## 2018-05-09 NOTE — PROGRESS NOTES
Service Date: 05/08/2018      REASON FOR VISIT:  Evaluation of syncope.      HISTORY OF PRESENT ILLNESS:  Ms. Oropeza is a pleasant 22-year-old lady with a history of Behcet's disease, rheumatoid arthritis, fibromyalgia and psychosocial issues, who presents for frequent episodes of syncope and is here for a second opinion regarding management of syncope.      The patient has a long history of syncope (fist episode in 2015).  She has seen several of my colleagues including Dr. Chilel, Dr. Benítez and most recently Dr. Sosa in 04/2018.  Apparently syncopal episodes  seem to be preceded by a sensation of warmth and diaphoresis.  In 2017, she had a tilt table test which was suggestive of vasovagal response.  She has had an extensive workup including an echocardiogram and a cardiac MRI which were essentially normal.        She saw my colleague, Dr. Sosa, who explained her well the fact that some of her symptoms are related to vasovagal, and definitely there is a psychological component to her symptoms as well.  She wore a 30-day event monitor which she was not able to finish as she had problems due to sensitive skin.  However, monitor was essentially negative, only showing sinus tachycardia and episodes of PACs and PVCs.      At the moment, she is doing well.  She informs she has not had any episodes of syncope since January.  She denies any other symptoms such as chest pain or shortness of breath.  EKG obtained today revealed normal sinus rhythm.      ASSESSMENT AND PLAN:  Syncope.  Unclear etiology, most likely multifactorial with a component of vasovagal syncope as well as psychological component.  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      During this visit, we discussed management of vasovagal phenomena.  I encouraged he to increase salt intake and hydration as well as physical activity.  We discussed the possibility of medications to prevent symptoms.  However, at this  time I do not think it is necessary.  She was encouraged to contact us if her symptoms get worse.         ARTIE IQBAL MD             D: 2018   T: 2018   MT: BILLY      Name:     LUCINA BAH   MRN:      [id number]        Account:      [id number]   :      1995           Service Date: 2018      Document: [id number]

## 2018-05-14 ENCOUNTER — TELEPHONE (OUTPATIENT)
Dept: RHEUMATOLOGY | Facility: CLINIC | Age: 23
End: 2018-05-14

## 2018-05-14 ENCOUNTER — MYC MEDICAL ADVICE (OUTPATIENT)
Dept: RHEUMATOLOGY | Facility: CLINIC | Age: 23
End: 2018-05-14

## 2018-05-14 DIAGNOSIS — M35.2 BEHCET'S DISEASE (H): ICD-10-CM

## 2018-05-14 RX ORDER — COLCHICINE 0.6 MG/1
TABLET ORAL
Qty: 30 TABLET | Refills: 0 | Status: SHIPPED | OUTPATIENT
Start: 2018-05-14 | End: 2018-05-21

## 2018-05-14 NOTE — TELEPHONE ENCOUNTER
PA Initiation    Medication: Otezla - PA Initiated  Insurance Company: [employer] - Phone [phone] Fax [phone]  Pharmacy Filling the Rx: [facility] - [place], [place] - [address]  Filling Pharmacy Phone:    Filling Pharmacy Fax:    Start Date: 5/14/2018

## 2018-05-15 ENCOUNTER — OFFICE VISIT (OUTPATIENT)
Dept: SLEEP MEDICINE | Facility: CLINIC | Age: 23
End: 2018-05-15
Payer: COMMERCIAL

## 2018-05-15 VITALS
WEIGHT: 148 LBS | HEIGHT: 63 IN | HEART RATE: 110 BPM | OXYGEN SATURATION: 100 % | DIASTOLIC BLOOD PRESSURE: 73 MMHG | BODY MASS INDEX: 26.22 KG/M2 | SYSTOLIC BLOOD PRESSURE: 106 MMHG

## 2018-05-15 DIAGNOSIS — G25.81 RESTLESS LEGS SYNDROME (RLS): ICD-10-CM

## 2018-05-15 DIAGNOSIS — G25.81 RESTLESS LEGS SYNDROME (RLS): Primary | ICD-10-CM

## 2018-05-15 DIAGNOSIS — G47.19 EXCESSIVE DAYTIME SLEEPINESS: ICD-10-CM

## 2018-05-15 DIAGNOSIS — F51.04 PSYCHOPHYSIOLOGICAL INSOMNIA: ICD-10-CM

## 2018-05-15 DIAGNOSIS — F51.5 NIGHTMARE DISORDER: ICD-10-CM

## 2018-05-15 LAB
FERRITIN SERPL-MCNC: 10 NG/ML (ref 12–150)
IRON SATN MFR SERPL: 27 % (ref 15–46)
IRON SERPL-MCNC: 164 UG/DL (ref 35–180)
TIBC SERPL-MCNC: 599 UG/DL (ref 240–430)

## 2018-05-15 PROCEDURE — 83550 IRON BINDING TEST: CPT | Performed by: INTERNAL MEDICINE

## 2018-05-15 PROCEDURE — 82728 ASSAY OF FERRITIN: CPT | Performed by: INTERNAL MEDICINE

## 2018-05-15 PROCEDURE — 83540 ASSAY OF IRON: CPT | Performed by: INTERNAL MEDICINE

## 2018-05-15 PROCEDURE — 36415 COLL VENOUS BLD VENIPUNCTURE: CPT | Performed by: INTERNAL MEDICINE

## 2018-05-15 PROCEDURE — 99215 OFFICE O/P EST HI 40 MIN: CPT | Performed by: INTERNAL MEDICINE

## 2018-05-15 NOTE — MR AVS SNAPSHOT
After Visit Summary   5/15/2018    Samara Oropeza    MRN: [id number]           Patient Information     Date Of Birth          1995        Visit Information        Provider Department      5/15/2018 11:00 AM Dashawn Colunga MD [facility]        Today's Diagnoses     Restless legs syndrome (RLS)    -  1    Psychophysiological insomnia        Excessive daytime sleepiness        Nightmare disorder          Care Instructions    We are going to check your iron levels. If they are low we will try to replace your iron with infusions.    Try to move the Guanfacine to around dinner and see if that helps with the trouble sleeping at night.    Rocky needs to give you calf and leg massages before bed to try to relieve the fidgeting.    You two need to figure out a consistent schedule as Rocky is waking you up every morning and your night is done after that.          Follow-ups after your visit        Your next 10 appointments already scheduled     May 15, 2018 12:30 PM CDT   LAB with BK LAB   [facility] ([facility])    [address] [place] [id number] [phone]           Please do not eat 10-12 hours before your appointment if you are coming in fasting for labs on lipids, cholesterol, or glucose (sugar). This does not apply to pregnant women. Water, hot tea and black coffee (with nothing added) are okay. Do not drink other fluids, diet soda or chew gum.            May 18, 2018 11:00 AM CDT   (Arrive by 10:30 AM)   New Visit with Ernestina Patel [facility] ([place] [id number] [place] [id number]  [facility] [id number] [phone]            May 24, 2018  2:30 PM CDT   Return Sleep Patient with Kimo Gaston PsyD   [facility] ([facility])    [address] [place] [address]  [address]   [phone]            May 29, 2018  3:00 PM CDT   (Arrive by 2:45 PM)   Return Botox with Frederick Bender MD   [facility]    [address] [address] [phone]            May 31, 2018  3:15 PM CDT   RETURN RETINA with Umer Heaton MD   [facility] ([facility])    [address]  [facility]  [facility] [address]   [phone]            Jun 13, 2018  3:15 PM CDT   Return Visit with Cata Hurst NP   [facility]    [address]  [address]  [facility] [address] [phone]            Jul 17, 2018 10:30 AM CDT   Return Visit with Austen Marquez MD   [facility] ([facility])    [address] [id number] [phone]            Aug 21, 2018 11:00 AM CDT   (Arrive by 10:45 AM)   Return Botox with Frederick Bender MD   [facility] ([facility]    [address] [place] [address] [phone]              Future tests that were ordered for you today     Open Future Orders        Priority Expected Expires Ordered    Ferritin Routine  5/15/2019 5/15/2018            Who to contact     If you have questions or need follow up information about today's clinic visit or your schedule please contact [facility] directly at [phone].  Normal or non-critical lab and imaging results will be communicated to you by MyChart, letter or phone within 4 business days after the clinic has received the results. If you do not hear from us within 7 days, please contact the clinic through MyChart or phone. If you have a critical or abnormal lab result, we will notify you by phone as soon as  "possible.  Submit refill requests through [employer] or call your pharmacy and they will forward the refill request to us. Please allow 3 business days for your refill to be completed.          Additional Information About Your Visit        [employer] Information     [employer] gives you secure access to your electronic health record. If you see a primary care provider, you can also send messages to your care team and make appointments. If you have questions, please call your primary care clinic.  If you do not have a primary care provider, please call [phone] and they will assist you.        Care EveryWhere ID     This is your Care EveryWhere ID. This could be used by other organizations to access your [place] medical records  [id number]        Your Vitals Were     Pulse Height Pulse Oximetry BMI (Body Mass Index)          110 1.588 m (5' 2.5\") 100% 26.64 kg/m2         Blood Pressure from Last 3 Encounters:   05/15/18 106/73   05/08/18 110/80   04/24/18 110/72    Weight from Last 3 Encounters:   05/15/18 67.1 kg (148 lb)   05/08/18 67 kg (147 lb 9.6 oz)   04/18/18 66.7 kg (147 lb)              We Performed the Following     CBC with platelets     Iron and iron binding capacity        Primary Care Provider Office Phone # Fax #    Sonja EVI Laguerre [facility] [phone]       [address] [facility] [address]  [facility] [address]        Equal Access to Services     [facility]: Hadii aad ku hadasho Soomaali, waaxda luqadaha, qaybta kaalmada adeegyada, mike salinas hayarlet rodriguez . So [facility] [phone].    ATENCIÓN: Si habla español, tiene a livingston disposición servicios gratuitos de asistencia lingüística. Jesus al [phone].    We comply with applicable federal civil rights laws and [place] laws. We do not discriminate on the basis of race, color, national origin, age, disability, sex, sexual orientation, or gender identity.            Thank you!     Thank you for choosing [facility]  for your care. Our goal is always to provide you with excellent care. Hearing back from our patients is one way we can continue to improve our services. Please take a few minutes to complete the written survey that you may receive in the mail after your visit with us. Thank you!             Your Updated Medication List - Protect others around you: Learn how to safely use, store and throw away your medicines at www.disposemymeds.org.          This list is accurate as of 5/15/18 12:23 PM.  Always use your most recent med list.                   Brand Name Dispense Instructions for use Diagnosis    albuterol 108 (90 Base) MCG/ACT Inhaler    PROAIR HFA/PROVENTIL HFA/VENTOLIN HFA    1 Inhaler    Inhale 2 puffs into the lungs every 6 hours as needed for shortness of breath / dyspnea or wheezing    Atypical chest pain       amitriptyline 50 MG tablet    ELAVIL    30 tablet    Take 1 tablet (50 mg) by mouth At Bedtime    Abdominal pain, generalized, Insomnia due to medical condition       apremilast 30 MG tablet    OTEZLA    180 tablet    Take 1 tablet (30 mg) by mouth 2 times daily    Behcet's disease (H)       artificial tears Oint ophthalmic ointment     1 Tube    0.5 inch strip each eye at night    Bilateral dry eyes       ASPIRIN CHILDRENS 81 MG chewable tablet   Generic drug:  aspirin      Take 81 mg by mouth as needed        aspirin-acetaminophen-caffeine 250-250-65 MG per tablet    EXCEDRIN MIGRAINE     Take 1 tablet by mouth every 6 hours as needed for headaches        benzocaine 20 % Gel    TOPICALE XTRA    30 g    Apply as needed locally to mouth or nasal ulcers for pain; 4 times daily as needed    Behcet's disease (H)       betamethasone valerate 0.1 % cream    VALISONE     Apply topically 2 times daily        * BOTOX IJ      Inject 165 Units as directed once Lot#: /C3 Exp: 10/2020        * BOTOX IJ      Inject 165 Units as directed once Lot # /C3  Exp:  10/2020        * botulinum toxin type A  100 units injection    BOTOX    200 Units    Inject 200 Units into the muscle every 3 months    Cervical dystonia       * BOTOX IJ      Inject 165 Units into the muscle once Lot /C3 Exp 06/2020        carbamide peroxide 6.5 % otic solution    DEBROX     5 drops 2 times daily        clobetasol 0.05 % cream    TEMOVATE    60 g    Apply topically 2 times daily    Folliculitis       colchicine 0.6 MG tablet    COLCYRS    30 tablet    0.6mg twice daily X 2 weeks and then stop    Behcet's disease (H)       COMPOUNDED NON-CONTROLLED SUBSTANCE - PHARMACY TO MIX COMPOUNDED MEDICATION    CMPD RX    30 capsule    Take one capsule in the morning    Fibromyalgia       cyproheptadine 4 MG tablet    PERIACTIN    30 tablet    Take 1 tablet (4 mg) by mouth At Bedtime For nightmares    Nightmares       diclofenac 1 % Gel topical gel    VOLTAREN    100 g    Apply 2-4 grams to affected area(s) up to 4 times per day as needed. This is an anti-inflammatory medication.    Polyarthralgia       dicyclomine 20 MG tablet    BENTYL    120 tablet    Take 1 tablet (20 mg) by mouth 4 times daily as needed    Abdominal cramping       diltiazem 2% in PLO cream (FV COMPOUNDED) 2% Gel     30 g    Apply small pea size amount three times daily to anus until pain is gone.    Anal fissure       diphenhydrAMINE 25 MG tablet    BENADRYL    30 tablet    Take 1 tablet (25 mg) by mouth every 8 hours as needed for allergies        EPINEPHrine 0.3 MG/0.3ML injection 2-pack    EPIPEN 2-EAMON    0.6 mL    Inject 0.3 mLs (0.3 mg) into the muscle as needed for anaphylaxis    Hx of bee sting allergy       * guanFACINE 1 MG tablet    TENEX    270 tablet    Take 3 tablets (3 mg) by mouth 2 times daily    Tic       * guanFACINE 1 MG tablet    TENEX    180 tablet    Take 3 tablets (3 mg) by mouth twice daily morning and evening.    Tic       hydrOXYzine 25 MG tablet    ATARAX    90 tablet    Take 1-2 tablets (25-50 mg) by mouth every 6 hours as needed for anxiety     TAHIR (generalized anxiety disorder)       hyoscyamine 0.125 MG tablet    ANASPAZ/LEVSIN    40 tablet    Take 1-2 tablets (125-250 mcg) by mouth every 4 hours as needed for cramping    Abdominal pain, generalized       hypromellose 0.3 % Soln ophthalmic solution    GENTEAL     1 drop every hour as needed for dry eyes        LACTAID PO      Take by mouth daily        lactulose 20 GM/30ML Soln     300 mL    Take 30 mLs by mouth 3 times daily as needed    Constipation, unspecified constipation type       lidocaine 2 % topical gel    XYLOCAINE     Apply 1 Tube topically daily Reported on 4/21/2017        lidocaine visc 2% 2.5mL/5mL & maalox/mylanta w/ simeth 2.5mL/5mL & diphenhydrAMINE 5mg/5mL Susp suspension    [facility]    1 Bottle    Swish and swallow 10 mLs in mouth every 6 hours as needed for mouth sores    Behcet's syndrome (H)       linaclotide 145 MCG capsule    LINZESS    90 capsule    Take 1 capsule (145 mcg) by mouth every morning (before breakfast)    Chronic idiopathic constipation       LORazepam 1 MG tablet    ATIVAN    60 tablet    Take 0.5 tablets (0.5 mg) by mouth 2 times daily as needed for other (atypical chest pain) Do not operate a vehicle after taking this medication    Chronic abdominal pain       lubiprostone 24 MCG capsule    AMITIZA    30 capsule    Take 1 capsule (24 mcg) by mouth 2 times daily (with meals)    Chronic idiopathic constipation       * norgestimate-ethinyl estradiol 0.25-35 MG-MCG per tablet    SPRINTEC 28    84 tablet    Take 1 tablet by mouth daily    General counseling for prescription of oral contraceptives       * SPRINTEC 28 0.25-35 MG-MCG per tablet   Generic drug:  norgestimate-ethinyl estradiol     84 tablet    1 by mouth daily    General counseling for prescription of oral contraceptives       omeprazole 40 MG capsule    priLOSEC    90 capsule    Take 1 capsule (40 mg) by mouth daily    Gastroesophageal reflux disease, esophagitis presence not specified        ondansetron 8 MG tablet    ZOFRAN    60 tablet    Take 1 tablet (8 mg) by mouth every 8 hours as needed for nausea    Nausea       sucralfate 1 GM/10ML suspension    CARAFATE    1200 mL    Take 10 mLs (1 g) by mouth 4 times daily    Bile reflux gastritis, Nausea       triamcinolone 0.1 % cream    KENALOG    15 g    Apply sparingly to oral ulcers three times daily for 14 days as needed.    Behcet's syndrome (H)       * Notice:  This list has 8 medication(s) that are the same as other medications prescribed for you. Read the directions carefully, and ask your doctor or other care provider to review them with you.

## 2018-05-15 NOTE — PROGRESS NOTES
Sleep Consultation:    Date on this visit: 5/15/2018    Samara Oropeza is sent by Kimo Gaston PsyD for a sleep consultation regarding hypersomnolence.    Primary Physician: Sonja Abreu     Alarm is set for 7:45 but zahra is up between 3 - 3:30 AM and may wake her (Actigraphy shows average wake up time of 3:30 AM).  Gets out of bed between 6:30 and 7:30 AM.  Will work out for 1 - 2 hours (yoga, pilates, cardio) from home.  Up doing chores around the house.  Rocky gets back around 2 PM and they go out for a few hours.  Then go to couch and watch TV and she cleans and gets up more.      Has constant urge to move that exacerbates severely at bedtime.      Reviewed Acti, Polysomnography, Multiple Sleep Latency Testing results in detail today with patient.  Discussed lack of consistent day-night schedule with short sleep periods and typical 3:30 AM awakening for the day, + utox for marijuana, Polysomnography without Obstructive Sleep Apnea but with normal architecture and increased PLMs, and abnormal but uninterpretable Multiple Sleep Latency Testing due to acti/utox concerns.    Allergies:      Allergies   Allergen Reactions     Amoxil [Penicillins] Rash     Dad unsure of reaction.     Bee Venom Anaphylaxis     Contrast Dye Rash     Contrast Media Ready-Box [facility], 04/09/2014.; Contrast Media Ready-Box [facility], 04/09/2014.  NOTE: this is a contrast media oral with iodine. Premedicate with methylpred standard for IV contrast, request barium contrast for oral contrast.     Kiwi Swelling     Orange Fruit [Citrus] Anaphylaxis     Pineapple Anaphylaxis, Difficulty breathing and Rash     Reglan [Metoclopramide] Other (See Comments)     IV dose only, in ER, rapid heart rate.     Ace Inhibitors      Difficulty in breathing and GI upset     Amitiza [Lubiprostone] Nausea and Vomiting     Amoxicillin-Pot Clavulanate      Latex      Midazolam Unknown     Other reaction(s): Unknown  parent states that when pt takes  this medication, she wakes up being very violent .  parent states that when pt takes this medication, she wakes up being very violent .     No Clinical Screening - See Comments      Bleech/ chest tightness, itchy throat, swollen tongue, hives     Versed      Coming out of pelvic exam at age of 5, was kicking and screaming when coming out of the versed.     Adhesive Tape Rash     Azithromycin Hives and Rash     Cephalexin Itching and Rash     Itchy mouth  Itchy mouth     Keflex [Cephalexin-Fd&C Yellow #6] Hives       Medications:    Current Outpatient Prescriptions   Medication Sig Dispense Refill     albuterol (PROAIR HFA/PROVENTIL HFA/VENTOLIN HFA) 108 (90 BASE) MCG/ACT Inhaler Inhale 2 puffs into the lungs every 6 hours as needed for shortness of breath / dyspnea or wheezing 1 Inhaler 1     amitriptyline (ELAVIL) 50 MG tablet Take 1 tablet (50 mg) by mouth At Bedtime 30 tablet 11     apremilast (OTEZLA) 30 MG tablet Take 1 tablet (30 mg) by mouth 2 times daily 180 tablet 0     artificial tears OINT ophthalmic ointment 0.5 inch strip each eye at night 1 Tube 11     aspirin (ASPIRIN CHILDRENS) 81 MG chewable tablet Take 81 mg by mouth as needed        aspirin-acetaminophen-caffeine (EXCEDRIN MIGRAINE) 250-250-65 MG per tablet Take 1 tablet by mouth every 6 hours as needed for headaches       benzocaine (TOPICALE XTRA) 20 % GEL Apply as needed locally to mouth or nasal ulcers for pain; 4 times daily as needed 30 g 1     betamethasone valerate (VALISONE) 0.1 % cream Apply topically 2 times daily       botulinum toxin type A (BOTOX) 100 UNITS injection Inject 200 Units into the muscle every 3 months 200 Units 3     carbamide peroxide (DEBROX) 6.5 % otic solution 5 drops 2 times daily       clobetasol (TEMOVATE) 0.05 % cream Apply topically 2 times daily 60 g 0     colchicine (COLCYRS) 0.6 MG tablet 0.6mg twice daily X 2 weeks and then stop 30 tablet 0     COMPOUNDED NON-CONTROLLED SUBSTANCE (CMPD RX) - PHARMACY TO MIX  COMPOUNDED MEDICATION Take one capsule in the morning 30 capsule 0     cyproheptadine (PERIACTIN) 4 MG tablet Take 1 tablet (4 mg) by mouth At Bedtime For nightmares 30 tablet 2     diclofenac (VOLTAREN) 1 % GEL topical gel Apply 2-4 grams to affected area(s) up to 4 times per day as needed. This is an anti-inflammatory medication. 100 g 4     dicyclomine (BENTYL) 20 MG tablet Take 1 tablet (20 mg) by mouth 4 times daily as needed 120 tablet 3     diltiazem 2% in PLO cream, FV COMPOUNDED, 2% GEL Apply small pea size amount three times daily to anus until pain is gone. 30 g 1     diphenhydrAMINE (BENADRYL) 25 MG tablet Take 1 tablet (25 mg) by mouth every 8 hours as needed for allergies 30 tablet 0     EPINEPHrine (EPIPEN 2-EAMON) 0.3 MG/0.3ML injection Inject 0.3 mLs (0.3 mg) into the muscle as needed for anaphylaxis 0.6 mL 3     guanFACINE (TENEX) 1 MG tablet Take 3 tablets (3 mg) by mouth 2 times daily 270 tablet 3     guanFACINE (TENEX) 1 MG tablet Take 3 tablets (3 mg) by mouth twice daily morning and evening. 180 tablet 1     hydrOXYzine (ATARAX) 25 MG tablet Take 1-2 tablets (25-50 mg) by mouth every 6 hours as needed for anxiety 90 tablet 2     hyoscyamine (ANASPAZ/LEVSIN) 0.125 MG tablet Take 1-2 tablets (125-250 mcg) by mouth every 4 hours as needed for cramping 40 tablet 1     hypromellose (GENTEAL) 0.3 % SOLN 1 drop every hour as needed for dry eyes        Lactase (LACTAID PO) Take by mouth daily       lactulose 20 GM/30ML SOLN Take 30 mLs by mouth 3 times daily as needed 300 mL 3     lidocaine (XYLOCAINE) 2 % jelly Apply 1 Tube topically daily Reported on 4/21/2017       linaclotide (LINZESS) 145 MCG capsule Take 1 capsule (145 mcg) by mouth every morning (before breakfast) 90 capsule 1     LORazepam (ATIVAN) 1 MG tablet Take 0.5 tablets (0.5 mg) by mouth 2 times daily as needed for other (atypical chest pain) Do not operate a vehicle after taking this medication 60 tablet 0     lubiprostone (AMITIZA) 24  MCG capsule Take 1 capsule (24 mcg) by mouth 2 times daily (with meals) 30 capsule 1     Magic Mouthwash (FV std formula) lidocaine visc 2% 2.5mL/5mL & maalox/mylanta w/ simeth 2.5mL/5mL & diphenhydrAMINE 5mg/5mL Swish and swallow 10 mLs in mouth every 6 hours as needed for mouth sores 1 Bottle 1     norgestimate-ethinyl estradiol (SPRINTEC 28) 0.25-35 MG-MCG per tablet Take 1 tablet by mouth daily 84 tablet 4     omeprazole (PRILOSEC) 40 MG capsule Take 1 capsule (40 mg) by mouth daily 90 capsule 3     OnabotulinumtoxinA (BOTOX IJ) Inject 165 Units into the muscle once Lot /C3  Exp 06/2020       OnabotulinumtoxinA (BOTOX IJ) Inject 165 Units as directed once Lot#: /C3  Exp: 10/2020       OnabotulinumtoxinA (BOTOX IJ) Inject 165 Units as directed once Lot # /C3   Exp:  10/2020       ondansetron (ZOFRAN) 8 MG tablet Take 1 tablet (8 mg) by mouth every 8 hours as needed for nausea 60 tablet 1     SPRINTEC 28 0.25-35 MG-MCG per tablet 1 by mouth daily 84 tablet 4     sucralfate (CARAFATE) 1 GM/10ML suspension Take 10 mLs (1 g) by mouth 4 times daily 1200 mL 2     triamcinolone (KENALOG) 0.1 % cream Apply sparingly to oral ulcers three times daily for 14 days as needed. 15 g 1       Problem List:  Patient Active Problem List    Diagnosis Date Noted     Pelvic floor weakness 04/25/2018     Priority: Medium     Spells of decreased attentiveness 12/19/2017     Priority: Medium     Mobile cecum 11/09/2017     Priority: Medium     Cecum noted in Right lower quadrant on 4/17 CT scan, and in Left upper Quadrant on CT on 11/2017.       Vitamin D deficiency 10/11/2017     Priority: Medium     How low, unknown/not found. On D when tested at 27. Starting cholecalciferol October 2017. Needs recheck.       Convulsions, unspecified convulsion type (H) 10/03/2017     Priority: Medium     Transient alteration of awareness 10/03/2017     Priority: Medium     Chronic pain syndrome 07/27/2017     Priority: Medium     Major  depressive disorder, recurrent episode, moderate (H) 06/27/2017     Priority: Medium     Cervical pain 05/02/2017     Priority: Medium     Acute left ankle pain 03/31/2017     Priority: Medium     Cervical dystonia 03/28/2017     Priority: Medium     PTSD (post-traumatic stress disorder) 01/17/2017     Priority: Medium     Patellofemoral instability 10/20/2016     Priority: Medium     Fibromyalgia 08/04/2016     Priority: Medium     Rheumatoid arthritis of multiple sites without rheumatoid factor (H) 08/04/2016     Priority: Medium     Raynaud's disease without gangrene 08/04/2016     Priority: Medium     Chronic abdominal pain 08/04/2016     Priority: Medium     Palpitations 01/12/2016     Priority: Medium     On colchicine therapy 10/30/2015     Priority: Medium     Spell of shaking 05/06/2015     Priority: Medium     Migraine 02/04/2015     Priority: Medium     Problem list name updated by automated process. Provider to review       Behcet's disease (H) 12/10/2014     Priority: Medium     Headaches due to old head injury 11/12/2013     Priority: Medium     Milk protein intolerance 10/11/2013     Priority: Medium     Concussion 02/13/2013     Priority: Medium     Jan 2013, with prolonged recovery- followed by sports med         Knee pain 01/03/2013     Priority: Medium     TAHIR (generalized anxiety disorder) 06/25/2009     Priority: Medium     Tics - Tourette syndrome 05/18/2009     Priority: Medium     Followed by psychotherapy. Symptoms well managed. Originally diagnosed at [facility]. (Dr. Simpson)           IBS (irritable bowel syndrome) 05/18/2009     Priority: Medium     Seasonal allergic rhinitis 05/18/2009     Priority: Medium        Past Medical/Surgical History:  Past Medical History:   Diagnosis Date     Anxiety      Arthritis      Behcet's disease (H)      Cervical adenitis May 2010     Chronic abdominal pain      Constipation, chronic 1994     Gastro-oesophageal reflux disease      Gastroparesis   "    Migraines      Neuromuscular disorder (H)     fibramyalgia     Palpitations      Seizure (H)      Seizures (H)     unknown etiology     Syncope      Tourette's      Past Surgical History:   Procedure Laterality Date     ARTHROSCOPY KNEE WITH PATELLAR REALIGNMENT  7/25/2013    Procedure: ARTHROSCOPY KNEE WITH PATELLAR REALIGNMENT;  Left Knee Arthroscopy, Medial Patellofemoral Ligament Reconstruction with Allograft  ;  Surgeon: Jennifer Acevedo MD;  Location: [facility]     COLONOSCOPY  2015     DENTAL SURGERY  1996    Teeth removal     ENDOSCOPY UPPER, COLONOSCOPY, COMBINED  2005     HC ESOPH/GAS REFLUX TEST W NASAL IMPED >1 HR N/A 2/15/2017    Procedure: ESOPHAGEAL IMPEDENCE FUNCTION TEST WITH 24 HOUR PH GREATER THAN 1 HOUR;  Surgeon: Timothy Matta MD;  [facility]       Social History:  Social History     Social History     Marital status: Single     Spouse name: N/A     Number of children: N/A     Years of education: N/A     Occupational History     Not on file.     Social History Main Topics     Smoking status: Never Smoker     Smokeless tobacco: Never Used     Alcohol use 0.6 oz/week     1 Standard drinks or equivalent per week      Comment: rarely     Drug use: No     Sexual activity: Yes     Partners: Male     Birth control/ protection: Pill     Other Topics Concern     Not on file     Social History Narrative    Boyfriend of 5 years, living with \"in laws\" Oct 2017 and looking forward to their own apartment.        Family History:  Family History   Problem Relation Age of Onset     Depression Mother      Neurologic Disorder Mother      Migraines, take imitrex injection.  Also in maternal grandmother.       Alcohol/Drug Father      Hypertension Father      Depression Father      Cardiovascular Maternal Grandmother      Depression Maternal Grandmother      Hypertension Maternal Grandmother      Alzheimer Disease Maternal Grandmother      Cardiovascular Maternal Grandfather      Hypertension " "Maternal Grandfather      Depression Maternal Grandfather      Alcohol/Drug Maternal Grandfather      Cardiovascular Paternal Grandmother      Hypertension Paternal Grandmother      Cardiovascular Paternal Grandfather      Hypertension Paternal Grandfather      Glaucoma No family hx of      Macular Degeneration No family hx of        Review of Systems:  A complete review of systems reviewed by me is negative with the exeption of what has been mentioned in the history of present illness.    Physical Examination:  Vitals: /73  Pulse 110  Ht 1.588 m (5' 2.5\")  Wt 67.1 kg (148 lb)  SpO2 100%  BMI 26.64 kg/m2  BMI= Body mass index is 26.64 kg/(m^2).    [place] Total Score 5/15/2018   Total score - [place] 18     Dyskinetic movement on exam.    Impression/Plan:  1. Restless legs syndrome (RLS)  Concern for severe Obstructive Sleep Apnea.  Discussed non-med approaches and iron deficiency. Will likely need IV iron infusion. May need to add Restless Legs Syndrome medication after.    May have had trouble with Gabapentin.  - Ferritin; Future  - CBC with platelets  - Iron and iron binding capacity    2. Psychophysiological insomnia  Discussed issues around #1 and timing of sleep.     3. Excessive daytime sleepiness  Concern about Restless Legs Syndrome and secondary excessive daytime sleepiness.    4. Nightmare disorder  Discussed IRT as an option and gave recommendatiosn.     F/u after evaluation for discussion around next steps of care.    Literature provided regarding restless leg syndrome.      She will follow up with me in approximately two weeks after her sleep study has been competed to review the results and discuss plan of care.       Polysomnography reviewed.  Obstructive sleep apnea reviewed.  Complications of untreated sleep apnea were reviewed.    Total time of care was 60 minutes, with > 50% of time spent on counseling and coordination of care.      Dashawn Colunga MD, Sleep Physician  May 15, 2018 "     CC: No ref. provider found

## 2018-05-15 NOTE — NURSING NOTE
"Chief Complaint   Patient presents with     Sleep Problem     sleep paralysis       Initial /73  Pulse 110  Ht 1.588 m (5' 2.5\")  Wt 67.1 kg (148 lb)  SpO2 100%  BMI 26.64 kg/m2 Estimated body mass index is 26.64 kg/(m^2) as calculated from the following:    Height as of this encounter: 1.588 m (5' 2.5\").    Weight as of this encounter: 67.1 kg (148 lb).    Medication Reconciliation: complete    Neck circumference: 13.5  "

## 2018-05-15 NOTE — PATIENT INSTRUCTIONS
We are going to check your iron levels. If they are low we will try to replace your iron with infusions.    Try to move the Guanfacine to around dinner and see if that helps with the trouble sleeping at night.    Rocky needs to give you calf and leg massages before bed to try to relieve the fidgeting.    You two need to figure out a consistent schedule as Rocky is waking you up every morning and your night is done after that.

## 2018-05-17 NOTE — TELEPHONE ENCOUNTER
PRIOR AUTHORIZATION DENIED    Medication: Otezla - PA denied, Appeal initiated    Denial Date: 5/16/2018    Denial Rational:     Appeal Information:

## 2018-05-21 DIAGNOSIS — M35.2 BEHCET'S DISEASE (H): ICD-10-CM

## 2018-05-21 RX ORDER — COLCHICINE 0.6 MG/1
TABLET ORAL
Qty: 120 TABLET | Refills: 2 | Status: SHIPPED | OUTPATIENT
Start: 2018-05-21 | End: 2018-07-17

## 2018-05-21 NOTE — TELEPHONE ENCOUNTER
Medication Appeal Initiation    We have initiated an appeal for the requested medication:  Medication: Otezla - PA denied, Appeal initiated  Appeal Start Date:  5/21/2018  Insurance Company:    Comments:

## 2018-05-24 ENCOUNTER — OFFICE VISIT (OUTPATIENT)
Dept: SLEEP MEDICINE | Facility: CLINIC | Age: 23
End: 2018-05-24
Payer: COMMERCIAL

## 2018-05-24 VITALS — SYSTOLIC BLOOD PRESSURE: 111 MMHG | DIASTOLIC BLOOD PRESSURE: 77 MMHG | OXYGEN SATURATION: 100 % | HEART RATE: 96 BPM

## 2018-05-24 DIAGNOSIS — F51.5 NIGHTMARE DISORDER: ICD-10-CM

## 2018-05-24 DIAGNOSIS — G25.81 RESTLESS LEGS SYNDROME (RLS): ICD-10-CM

## 2018-05-24 DIAGNOSIS — F51.04 PSYCHOPHYSIOLOGICAL INSOMNIA: Primary | ICD-10-CM

## 2018-05-24 PROCEDURE — 90832 PSYTX W PT 30 MINUTES: CPT | Performed by: PSYCHOLOGIST

## 2018-05-24 NOTE — NURSING NOTE
"Chief Complaint   Patient presents with     RECHECK     f/u       Initial /77  Pulse 96  SpO2 100% Estimated body mass index is 26.64 kg/(m^2) as calculated from the following:    Height as of 5/15/18: 1.588 m (5' 2.5\").    Weight as of 5/15/18: 67.1 kg (148 lb).    Medication Reconciliation: complete      "

## 2018-05-24 NOTE — TELEPHONE ENCOUNTER
MEDICATION APPEAL APPROVED    Medication: Otezla - FATEMEH hernandez, Appeal Approved  Authorization Effective Date: 4/23/2018  Authorization Expiration Date: 5/23/2020  Approved Dose/Quantity:   Reference #: [id number]   Insurance Company: [employer] [phone] Fax [phone]  Expected CoPay:       CoPay Card Available: Yes   Foundation Assistance Needed:    Which Pharmacy is filling the prescription (Not needed for infusion/clinic administered): [facility] - [place], [place] - [address] [place] [address] COURT

## 2018-05-26 NOTE — PROGRESS NOTES
SLEEP MEDICINE PROGRESS NOTE  Sleep Psychology    Patient Name: Samara Oropeza  MRN:  [id number]  Date of Service: May 24, 2018       Subjective Report     Samara Oropeza returns to the sleep clinic today to discuss progress in implementing bbehavioral strategies for the management of chronic insomnia associated with history of nightmares, depression and restless legs..  Samara reports marginal improvement in sleep quality.  Still rating insomnia and severe range.  She has insight that she has not made substantial changes in sleep habits or behavior as outlined in initial plan.  She is reporting some contribution from restless legs to symptoms particularly difficulties with sleep latency.  No significant change in nightmare frequency reported.  Focus on session was reviewing recommendations for sleep hygiene, review of behavioral plan with focus on maintenance of consistent wake time and stimulus control.     .     Sleep Data:     Source of Data: Verbal self-report, no sleep diaries, significant variation in sleep latency, wake after sleep onset reported as this can be determined sleep efficiency is approximately 75-80%.  With extended onset to sleep latency of up to 1-1 hours, continued frequent awakenings and extended wake after sleep onset approximately 1 hour      Total score - [place]: 7 .    MICHAEL Total Score: 25       Interventions     Strategies and recommendations including implementation and management of stimulus control, the importance of maintaining consistent sleep offset time, follow-up around restless legs were discussed today.       Vital Signs     /77  Pulse 96  SpO2 100%     Mental Status     Appearance:  Well kept  Orientation:  X3  Mood:  better  Affect:  Congruent with mood  Speech/Language:  Normal  Thought Process: Intact  Associations:  Normal  Thought Content: Normal    Diagnostic Impressions and Plan       1. Psychophysiological insomnia  There are significant  psychophysiologic factors to insomnia, contribution from restless legs may also be important to consider.  There are significant compromise sleep hygiene and continued fairly wide variation in sleep schedule we will continue to work with patient.    2. Restless legs syndrome (RLS)  We will follow-up with Dr. Colunga regarding options for treatment     3. Nightmare disorder  No significant change in nightmare disorder frequency, intensity somewhat diminished        Follow-up: 4 weeks    Time Spent: 25 minutes      Kimo Gaston PsyD, PAULETTE, CBSM  Certified, Behavioral Sleep Medicine  [facility]

## 2018-05-29 ENCOUNTER — OFFICE VISIT (OUTPATIENT)
Dept: PHYSICAL MEDICINE AND REHAB | Facility: CLINIC | Age: 23
End: 2018-05-29
Payer: COMMERCIAL

## 2018-05-29 VITALS
BODY MASS INDEX: 26.4 KG/M2 | WEIGHT: 149 LBS | SYSTOLIC BLOOD PRESSURE: 99 MMHG | HEART RATE: 60 BPM | HEIGHT: 63 IN | DIASTOLIC BLOOD PRESSURE: 62 MMHG

## 2018-05-29 DIAGNOSIS — G43.719 INTRACTABLE CHRONIC MIGRAINE WITHOUT AURA AND WITHOUT STATUS MIGRAINOSUS: Primary | ICD-10-CM

## 2018-05-29 ASSESSMENT — PAIN SCALES - GENERAL: PAINLEVEL: MODERATE PAIN (5)

## 2018-05-29 NOTE — MR AVS SNAPSHOT
After Visit Summary   5/29/2018    Samara Oropeza    MRN: [id number]           Patient Information     Date Of Birth          1995        Visit Information        Provider Department      5/29/2018 3:00 PM Frederick Bender MD [facility]        Today's Diagnoses     Intractable chronic migraine without aura and without status migrainosus    -  1       Follow-ups after your visit        Follow-up notes from your care team     Return in about 12 weeks (around 8/21/2018) for Chronic Migraine.      Your next 10 appointments already scheduled     May 31, 2018  3:15 PM CDT   RETURN RETINA with Umer Heaton MD   [facility] ([facility])    [facility]  [address]  [address] [address]  [place] [address]   [phone]            Jun 01, 2018 11:15 AM CDT   Return Visit with Cata Hurst NP   [facility] ([facility])    [address]  [address]  [facility] [address] [phone]            Jun 18, 2018  1:00 PM CDT   (Arrive by 12:30 PM)   New Visit with Ernestina Patel [facility] ([facility])    [facility]  [address]  [address]  [place] [address] [phone]            Jul 17, 2018 10:30 AM CDT   Return Visit with Austen Marquez MD   [facility])    [address] [address] [phone]            Aug 21, 2018 11:00 AM CDT   (Arrive by 10:45 AM)   Return Botox with Frederick Bender MD   [facility] Medicine [facility] Rehabilitation ([facility] [place] [facility])    [address]  [address] [facility] [address] [phone]            Nov 13, 2018  3:40 PM CST   (Arrive by 3:25 PM)   Return Botox with Alejandrina Hernandez MD   [facility] (  [facility])    [address]  3rd Floor  [facility] [address] [phone]              Who to contact     Please call your clinic at [phone] to:    Ask questions about your health    Make or cancel appointments    Discuss your medicines    Learn about your test results    Speak to your doctor            Additional Information About Your Visit        docplannerhart Information     [employer] gives you secure access to your electronic health record. If you see a primary care provider, you can also send messages to your care team and make appointments. If you have questions, please call your primary care clinic.  If you do not have a primary care provider, please call [phone] and they will assist you.      [employer] is an electronic gateway that provides easy, online access to your medical records. With [employer], you can request a clinic appointment, read your test results, renew a prescription or communicate with your care team.     To access your existing account, please contact your [facility] or call [phone] for assistance.        Care EveryWhere ID     This is your Care EveryWhere ID. This could be used by other organizations to access your [facility] medical records  [id number]        Your Vitals Were     Pulse Height BMI (Body Mass Index)             60 1.588 m (5' 2.5\") 26.82 kg/m2          Blood Pressure from Last 3 Encounters:   05/29/18 99/62   05/24/18 111/77   05/15/18 106/73    Weight from Last 3 Encounters:   05/29/18 67.6 kg (149 lb)   05/15/18 67.1 kg (148 lb)   05/08/18 67 kg (147 lb 9.6 oz)              We Performed the Following     HC CHEMODENERVATE FACIAL,TRIGERM,NERV MIGRAINE     Needle EMG Guide w/Chemodenervation ([id number])        Primary Care Provider Office Phone # Fax #    EVI Cardona [facility] [phone]       [address] [facility] [address]  [facility] [address]        Equal Access to Services     NABIL GALEANO" AH: Hadii malcolm brady modestao Pamela, waaxda luqadaha, qaybta kajayson juan, mike verónicain hayaaemmy dumontdavid crane laRobertarlet parsons. So [facility] [phone].    ATENCIÓN: Si henry nuñez, tiene a livingston disposición servicios gratuitos de asistencia lingüística. Llame al [phone].    We comply with applicable federal civil rights laws and [place] laws. We do not discriminate on the basis of race, color, national origin, age, disability, sex, sexual orientation, or gender identity.            Thank you!     Thank you for choosing [facility] PHYSICAL MEDICINE AND REHABILITATION  for your care. Our goal is always to provide you with excellent care. Hearing back from our patients is one way we can continue to improve our services. Please take a few minutes to complete the written survey that you may receive in the mail after your visit with us. Thank you!             Your Updated Medication List - Protect others around you: Learn how to safely use, store and throw away your medicines at www.disposemymeds.org.          This list is accurate as of 5/29/18  4:33 PM.  Always use your most recent med list.                   Brand Name Dispense Instructions for use Diagnosis    albuterol 108 (90 Base) MCG/ACT Inhaler    PROAIR HFA/PROVENTIL HFA/VENTOLIN HFA    1 Inhaler    Inhale 2 puffs into the lungs every 6 hours as needed for shortness of breath / dyspnea or wheezing    Atypical chest pain       amitriptyline 50 MG tablet    ELAVIL    30 tablet    Take 1 tablet (50 mg) by mouth At Bedtime    Abdominal pain, generalized, Insomnia due to medical condition       apremilast 30 MG tablet    OTEZLA    180 tablet    Take 1 tablet (30 mg) by mouth 2 times daily    Behcet's disease (H)       artificial tears Oint ophthalmic ointment     1 Tube    0.5 inch strip each eye at night    Bilateral dry eyes       ASPIRIN CHILDRENS 81 MG chewable tablet   Generic drug:  aspirin      Take 81 mg by mouth as needed        aspirin-acetaminophen-caffeine  250-250-65 MG per tablet    EXCEDRIN MIGRAINE     Take 1 tablet by mouth every 6 hours as needed for headaches        benzocaine 20 % Gel    TOPICALE XTRA    30 g    Apply as needed locally to mouth or nasal ulcers for pain; 4 times daily as needed    Behcet's disease (H)       betamethasone valerate 0.1 % cream    VALISONE     Apply topically 2 times daily        * BOTOX IJ      Inject 165 Units as directed once Lot#: /C3 Exp: 10/2020        * BOTOX IJ      Inject 165 Units as directed once Lot # /C3  Exp:  10/2020        * botulinum toxin type A 100 units injection    BOTOX    200 Units    Inject 200 Units into the muscle every 3 months    Cervical dystonia       * BOTOX IJ      Inject 165 Units into the muscle once Lot /C3 Exp 06/2020        * BOTOX IJ      Inject 175 Units into the muscle once Lot # /C3 with Expiration Date:  11/2020        carbamide peroxide 6.5 % otic solution    DEBROX     5 drops 2 times daily        clobetasol 0.05 % cream    TEMOVATE    60 g    Apply topically 2 times daily    Folliculitis       colchicine 0.6 MG tablet    COLCYRS    120 tablet    Take 1.2mg in AM and 0.6mg in PM every day    Behcet's disease (H)       COMPOUNDED NON-CONTROLLED SUBSTANCE - PHARMACY TO MIX COMPOUNDED MEDICATION    CMPD RX    30 capsule    Take one capsule in the morning    Fibromyalgia       cyproheptadine 4 MG tablet    PERIACTIN    30 tablet    Take 1 tablet (4 mg) by mouth At Bedtime For nightmares    Nightmares       diclofenac 1 % Gel topical gel    VOLTAREN    100 g    Apply 2-4 grams to affected area(s) up to 4 times per day as needed. This is an anti-inflammatory medication.    Polyarthralgia       dicyclomine 20 MG tablet    BENTYL    120 tablet    Take 1 tablet (20 mg) by mouth 4 times daily as needed    Abdominal cramping       diltiazem 2% in PLO cream (FV COMPOUNDED) 2% Gel     30 g    Apply small pea size amount three times daily to anus until pain is gone.    Anal fissure        diphenhydrAMINE 25 MG tablet    BENADRYL    30 tablet    Take 1 tablet (25 mg) by mouth every 8 hours as needed for allergies        EPINEPHrine 0.3 MG/0.3ML injection 2-pack    EPIPEN 2-EAMON    0.6 mL    Inject 0.3 mLs (0.3 mg) into the muscle as needed for anaphylaxis    Hx of bee sting allergy       * guanFACINE 1 MG tablet    TENEX    270 tablet    Take 3 tablets (3 mg) by mouth 2 times daily    Tic       * guanFACINE 1 MG tablet    TENEX    180 tablet    Take 3 tablets (3 mg) by mouth twice daily morning and evening.    Tic       hydrOXYzine 25 MG tablet    ATARAX    90 tablet    Take 1-2 tablets (25-50 mg) by mouth every 6 hours as needed for anxiety    TAHIR (generalized anxiety disorder)       hyoscyamine 0.125 MG tablet    ANASPAZ/LEVSIN    40 tablet    Take 1-2 tablets (125-250 mcg) by mouth every 4 hours as needed for cramping    Abdominal pain, generalized       hypromellose 0.3 % Soln ophthalmic solution    GENTEAL     1 drop every hour as needed for dry eyes        LACTAID PO      Take by mouth daily        lactulose 20 GM/30ML Soln     300 mL    Take 30 mLs by mouth 3 times daily as needed    Constipation, unspecified constipation type       lidocaine 2 % topical gel    XYLOCAINE     Apply 1 Tube topically daily Reported on 4/21/2017        lidocaine visc 2% 2.5mL/5mL & maalox/mylanta w/ simeth 2.5mL/5mL & diphenhydrAMINE 5mg/5mL Susp suspension    [facility]    1 Bottle    Swish and swallow 10 mLs in mouth every 6 hours as needed for mouth sores    Behcet's syndrome (H)       linaclotide 145 MCG capsule    LINZESS    90 capsule    Take 1 capsule (145 mcg) by mouth every morning (before breakfast)    Chronic idiopathic constipation       LORazepam 1 MG tablet    ATIVAN    60 tablet    Take 0.5 tablets (0.5 mg) by mouth 2 times daily as needed for other (atypical chest pain) Do not operate a vehicle after taking this medication    Chronic abdominal pain       lubiprostone 24 MCG  capsule    AMITIZA    30 capsule    Take 1 capsule (24 mcg) by mouth 2 times daily (with meals)    Chronic idiopathic constipation       * norgestimate-ethinyl estradiol 0.25-35 MG-MCG per tablet    SPRINTEC 28    84 tablet    Take 1 tablet by mouth daily    General counseling for prescription of oral contraceptives       * SPRINTEC 28 0.25-35 MG-MCG per tablet   Generic drug:  norgestimate-ethinyl estradiol     84 tablet    1 by mouth daily    General counseling for prescription of oral contraceptives       omeprazole 40 MG capsule    priLOSEC    90 capsule    Take 1 capsule (40 mg) by mouth daily    Gastroesophageal reflux disease, esophagitis presence not specified       ondansetron 8 MG tablet    ZOFRAN    60 tablet    Take 1 tablet (8 mg) by mouth every 8 hours as needed for nausea    Nausea       sucralfate 1 GM/10ML suspension    CARAFATE    1200 mL    Take 10 mLs (1 g) by mouth 4 times daily    Bile reflux gastritis, Nausea       triamcinolone 0.1 % cream    KENALOG    15 g    Apply sparingly to oral ulcers three times daily for 14 days as needed.    Behcet's syndrome (H)       * Notice:  This list has 9 medication(s) that are the same as other medications prescribed for you. Read the directions carefully, and ask your doctor or other care provider to review them with you.

## 2018-05-29 NOTE — PROGRESS NOTES
"BOTULINUM TOXIN PROCEDURE - HEADACHE - NOTE    Chief Complaint   Patient presents with     RECHECK     BOTOX       BP 99/62  Pulse 60  Ht 1.588 m (5' 2.5\")  Wt 67.6 kg (149 lb)  BMI 26.82 kg/m2       Current Outpatient Prescriptions:      albuterol (PROAIR HFA/PROVENTIL HFA/VENTOLIN HFA) 108 (90 BASE) MCG/ACT Inhaler, Inhale 2 puffs into the lungs every 6 hours as needed for shortness of breath / dyspnea or wheezing, Disp: 1 Inhaler, Rfl: 1     amitriptyline (ELAVIL) 50 MG tablet, Take 1 tablet (50 mg) by mouth At Bedtime, Disp: 30 tablet, Rfl: 11     apremilast (OTEZLA) 30 MG tablet, Take 1 tablet (30 mg) by mouth 2 times daily, Disp: 180 tablet, Rfl: 0     artificial tears OINT ophthalmic ointment, 0.5 inch strip each eye at night, Disp: 1 Tube, Rfl: 11     aspirin (ASPIRIN CHILDRENS) 81 MG chewable tablet, Take 81 mg by mouth as needed , Disp: , Rfl:      aspirin-acetaminophen-caffeine (EXCEDRIN MIGRAINE) 250-250-65 MG per tablet, Take 1 tablet by mouth every 6 hours as needed for headaches, Disp: , Rfl:      benzocaine (TOPICALE XTRA) 20 % GEL, Apply as needed locally to mouth or nasal ulcers for pain; 4 times daily as needed, Disp: 30 g, Rfl: 1     betamethasone valerate (VALISONE) 0.1 % cream, Apply topically 2 times daily, Disp: , Rfl:      botulinum toxin type A (BOTOX) 100 UNITS injection, Inject 200 Units into the muscle every 3 months, Disp: 200 Units, Rfl: 3     carbamide peroxide (DEBROX) 6.5 % otic solution, 5 drops 2 times daily, Disp: , Rfl:      clobetasol (TEMOVATE) 0.05 % cream, Apply topically 2 times daily, Disp: 60 g, Rfl: 0     colchicine (COLCYRS) 0.6 MG tablet, Take 1.2mg in AM and 0.6mg in PM every day, Disp: 120 tablet, Rfl: 2     COMPOUNDED NON-CONTROLLED SUBSTANCE (CMPD RX) - PHARMACY TO MIX COMPOUNDED MEDICATION, Take one capsule in the morning, Disp: 30 capsule, Rfl: 0     cyproheptadine (PERIACTIN) 4 MG tablet, Take 1 tablet (4 mg) by mouth At Bedtime For nightmares, Disp: 30 " tablet, Rfl: 2     diclofenac (VOLTAREN) 1 % GEL topical gel, Apply 2-4 grams to affected area(s) up to 4 times per day as needed. This is an anti-inflammatory medication., Disp: 100 g, Rfl: 4     dicyclomine (BENTYL) 20 MG tablet, Take 1 tablet (20 mg) by mouth 4 times daily as needed, Disp: 120 tablet, Rfl: 3     diltiazem 2% in PLO cream, FV COMPOUNDED, 2% GEL, Apply small pea size amount three times daily to anus until pain is gone., Disp: 30 g, Rfl: 1     diphenhydrAMINE (BENADRYL) 25 MG tablet, Take 1 tablet (25 mg) by mouth every 8 hours as needed for allergies, Disp: 30 tablet, Rfl: 0     EPINEPHrine (EPIPEN 2-EAMON) 0.3 MG/0.3ML injection, Inject 0.3 mLs (0.3 mg) into the muscle as needed for anaphylaxis, Disp: 0.6 mL, Rfl: 3     guanFACINE (TENEX) 1 MG tablet, Take 3 tablets (3 mg) by mouth twice daily morning and evening., Disp: 180 tablet, Rfl: 1     guanFACINE (TENEX) 1 MG tablet, Take 3 tablets (3 mg) by mouth 2 times daily, Disp: 270 tablet, Rfl: 3     hydrOXYzine (ATARAX) 25 MG tablet, Take 1-2 tablets (25-50 mg) by mouth every 6 hours as needed for anxiety, Disp: 90 tablet, Rfl: 2     hyoscyamine (ANASPAZ/LEVSIN) 0.125 MG tablet, Take 1-2 tablets (125-250 mcg) by mouth every 4 hours as needed for cramping, Disp: 40 tablet, Rfl: 1     hypromellose (GENTEAL) 0.3 % SOLN, 1 drop every hour as needed for dry eyes , Disp: , Rfl:      Lactase (LACTAID PO), Take by mouth daily, Disp: , Rfl:      lactulose 20 GM/30ML SOLN, Take 30 mLs by mouth 3 times daily as needed, Disp: 300 mL, Rfl: 3     lidocaine (XYLOCAINE) 2 % jelly, Apply 1 Tube topically daily Reported on 4/21/2017, Disp: , Rfl:      linaclotide (LINZESS) 145 MCG capsule, Take 1 capsule (145 mcg) by mouth every morning (before breakfast), Disp: 90 capsule, Rfl: 1     LORazepam (ATIVAN) 1 MG tablet, Take 0.5 tablets (0.5 mg) by mouth 2 times daily as needed for other (atypical chest pain) Do not operate a vehicle after taking this medication, Disp: 60  tablet, Rfl: 0     lubiprostone (AMITIZA) 24 MCG capsule, Take 1 capsule (24 mcg) by mouth 2 times daily (with meals), Disp: 30 capsule, Rfl: 1     Magic Mouthwash (FV std formula) lidocaine visc 2% 2.5mL/5mL & maalox/mylanta w/ simeth 2.5mL/5mL & diphenhydrAMINE 5mg/5mL, Swish and swallow 10 mLs in mouth every 6 hours as needed for mouth sores, Disp: 1 Bottle, Rfl: 1     norgestimate-ethinyl estradiol (SPRINTEC 28) 0.25-35 MG-MCG per tablet, Take 1 tablet by mouth daily, Disp: 84 tablet, Rfl: 4     omeprazole (PRILOSEC) 40 MG capsule, Take 1 capsule (40 mg) by mouth daily, Disp: 90 capsule, Rfl: 3     OnabotulinumtoxinA (BOTOX IJ), Inject 165 Units as directed once Lot#: /C3 Exp: 10/2020, Disp: , Rfl:      OnabotulinumtoxinA (BOTOX IJ), Inject 165 Units as directed once Lot # /C3  Exp:  10/2020, Disp: , Rfl:      OnabotulinumtoxinA (BOTOX IJ), Inject 165 Units into the muscle once Lot /C3 Exp 06/2020, Disp: , Rfl:      ondansetron (ZOFRAN) 8 MG tablet, Take 1 tablet (8 mg) by mouth every 8 hours as needed for nausea, Disp: 60 tablet, Rfl: 1     SPRINTEC 28 0.25-35 MG-MCG per tablet, 1 by mouth daily, Disp: 84 tablet, Rfl: 4     sucralfate (CARAFATE) 1 GM/10ML suspension, Take 10 mLs (1 g) by mouth 4 times daily, Disp: 1200 mL, Rfl: 2     triamcinolone (KENALOG) 0.1 % cream, Apply sparingly to oral ulcers three times daily for 14 days as needed., Disp: 15 g, Rfl: 1     Allergies   Allergen Reactions     Amoxil [Penicillins] Rash     Dad unsure of reaction.     Bee Venom Anaphylaxis     Contrast Dye Rash     Contrast Media Ready-Box [facility], 04/09/2014.; Contrast Media Ready-Box [facility], 04/09/2014.  NOTE: this is a contrast media oral with iodine. Premedicate with methylpred standard for IV contrast, request barium contrast for oral contrast.     Kiwi Swelling     Orange Fruit [Citrus] Anaphylaxis     Pineapple Anaphylaxis, Difficulty breathing and Rash     Reglan [Metoclopramide] Other (See Comments)      IV dose only, in ER, rapid heart rate.     Ace Inhibitors      Difficulty in breathing and GI upset     Amitiza [Lubiprostone] Nausea and Vomiting     Amoxicillin-Pot Clavulanate      Latex      Midazolam Unknown     Other reaction(s): Unknown  parent states that when pt takes this medication, she wakes up being very violent .  parent states that when pt takes this medication, she wakes up being very violent .     No Clinical Screening - See Comments      Bleech/ chest tightness, itchy throat, swollen tongue, hives     Versed      Coming out of pelvic exam at age of 5, was kicking and screaming when coming out of the versed.     Adhesive Tape Rash     Azithromycin Hives and Rash     Cephalexin Itching and Rash     Itchy mouth  Itchy mouth     Keflex [Cephalexin-Fd&C Yellow #6] Hives        PHYSICAL EXAM:    Currently has a 5/10 headache  No facial asymmetry    HPI:    Patient reports the following new medical problems since last visit: She developed a bacterial infection in her pelvis. She also had a sleep study done, which found that she is not sleeping more than 5 hours per night. She has also been participating in physical therapy, which is giving her significant pain in her upper traps and she is subluxing her fibula. She was also diagnosed with iron deficiency anemia and will be getting iron infusions starting in the next week.     We reviewed the recommended safety guidelines for  Botox from any vaccine injection, such as the seasonal flu vaccine, by a minimum of 10-14 days with Samara Oropeza. She acknowledged understanding.    RESPONSE TO PREVIOUS TREATMENT:  Change in headache pattern following last series of injections with 165 units of  Botox on 12/6/2017.    Post-procedural headache: Rated as 'Mild' severity.  Duration: 4 days followed by complete resolution    1.  Headache frequency during this injection cycle: 4 headache days per month.  This is compared to her baseline headache  frequency of 20 headache days per month.     2.  Headache duration during this injection cycle:  Headache duration ranged from 1.5 hours to 1 days. Patient reports no episodes of multiple day headaches during this injection cycle.     3.  Headache intensity during this injection cycle:    A.  6/10  =  Typical pain level.  B.  10/10  =  Worst pain level.  C.  0/10  =  Lowest pain level.    4.  Change in headache medication usage during this injection cycle:  No changes. She will take one Excedrin migraine for a severe headache.     5.  ER Visits During This Injection Cycle:  None.    6.  Functional Performance:  Change in ADL's, social interaction, days lost from work, etc. Patient reports no days lost from work due to headache during this injection cycle      BOTULINUM NEUROTOXIN INJECTION PROCEDURES:      VERIFICATION OF PATIENT IDENTIFICATION AND PROCEDURE     Initials   Patient Name ses   Patient  ses   Procedure Verified by: kirill     Prior to the start of the procedure and with procedural staff participation, I verbally confirmed the patient s identity using two indicators, relevant allergies, that the procedure was appropriate and matched the consent or emergent situation, and that the correct equipment/implants were available. Immediately prior to starting the procedure I conducted the Time Out with the procedural staff and re-confirmed the patient s name, procedure, and site/side. (The Joint Commission universal protocol was followed.)  Yes    Sedation (Moderate or Deep): None    Above assessments performed by:  Alejandrina Bender MD      INDICATIONS FOR PROCEDURES:  Samara Oropeza is a 22-year-old patient with chronic migraine headaches associated with cervicogenic  components.     Her baseline symptoms have been recalcitrant to oral medications and conservative therapy.  She is here today for reinjection with Botox.    GOAL OF PROCEDURE:  The goal of this procedure is to  increase active range of motion, improve volitional motor control, decrease pain  and enhance functional independence.    TOTAL DOSE ADMINISTERED:  Dose Administered:  175 units  Botox (Botulinum Toxin Type A)       2:1 Dilution   Diluent Used:  0.25% Sensorcaine [facility] [id number]; 10/2019  Total Volume of Diluent Used:  3.5  ml  Lot # /C3 with Expiration Date:  11/2020  NDC #: Botox 100u ([address]-26)    Medication guide was offered to patient and was declined.    CONSENT:  The risks, benefits, and treatment options were discussed with Samara Oropeza and she agreed to proceed.    Written consent was obtained by [facility].     EQUIPMENT USED:  Needle-37mm stimulating/recording  Needle-30 gauge  EMG/NCS Machine    SKIN PREPARATION:  Skin preparation was performed using an alcohol wipe.    GUIDANCE DESCRIPTION:  Electro-myographic guidance was necessary throughout the procedure to accurately identify all areas of spastic muscles while avoiding injection of non-spastic muscles, neighboring nerves and nearby vascular structures.     AREA/MUSCLE INJECTED:  175 UNITS BOTOX = TOTAL DOSE, DILUTION 2:1 NS and 0.25% Sensorcaine     1 & 2. SHOULDER GIRDLE & NECK MUSCLES: 20 units Botox = Total Dose, 2:1 Dilution   Right Splenius - 5 units of Botox at 1 site/s.   Left Splenius - 5 units of Botox at 1 site/s.      Right Levator Scapulae - 5 units of Botox at 1 site/s (shoulder muscles).   Left Levator Scapulae - 5 units of Botox at 1 site/s (shoulder muscles).     3. HEAD & SCALP MUSCLES: 155 units Botox = Total Dose, 2:1 Dilution  Right Occipitalis - 10 units of Botox at 3 site/s.   Left Occipitalis - 10 units of Botox at 3 site/s.     Right Frontalis - 15 units of Botox at 4 site/s.  Left Frontalis - 15 units of Botox at 4 site/s.     Right Temporalis - 45 units of Botox at 8 site/s.  Left Temporalis - 45 units of Botox at 8 site/s.     Right  - 5 units of Botox at 1 site/s.              Left  - 5 units of  Botox at 1 site/s.      Procerus - 5 units of Botox at 1 site/s.    RESPONSE TO PROCEDURE:  Samara Oropeza tolerated the procedure well and there were no immediate complications.   She was allowed to recover for an appropriate period of time and was discharged home in stable condition.    FOLLOW UP:  Samara Oropeza was asked to follow up by phone in 7-14 days with Marcelle Richardson PT, Care Coordinator or Vidya Dickinson RN, Care Coordinator, to report her response to this series of injections.  Based on the patient's previous response to this therapy, Samara Oropeza was rescheduled for the next series of injections in 12 weeks.    PLAN (Medication Changes, Therapy Orders, Work or Disability Issues, etc.): Patient will continue to monitor response to today's injections.     There were 25 units of Botox as unavoidable waste for this patient.

## 2018-05-29 NOTE — LETTER
"5/29/2018       RE: Samara Oropeza  [address]  [address]  [place] [address]     Dear Colleague,    Thank you for referring your patient, Samara Oropeza, to the [facility] PHYSICAL MEDICINE AND REHABILITATION at [facility]. Please see a copy of my visit note below.    BOTULINUM TOXIN PROCEDURE - HEADACHE - NOTE    Chief Complaint   Patient presents with     RECHECK     BOTOX       BP 99/62  Pulse 60  Ht 1.588 m (5' 2.5\")  Wt 67.6 kg (149 lb)  BMI 26.82 kg/m2       Current Outpatient Prescriptions:      albuterol (PROAIR HFA/PROVENTIL HFA/VENTOLIN HFA) 108 (90 BASE) MCG/ACT Inhaler, Inhale 2 puffs into the lungs every 6 hours as needed for shortness of breath / dyspnea or wheezing, Disp: 1 Inhaler, Rfl: 1     amitriptyline (ELAVIL) 50 MG tablet, Take 1 tablet (50 mg) by mouth At Bedtime, Disp: 30 tablet, Rfl: 11     apremilast (OTEZLA) 30 MG tablet, Take 1 tablet (30 mg) by mouth 2 times daily, Disp: 180 tablet, Rfl: 0     artificial tears OINT ophthalmic ointment, 0.5 inch strip each eye at night, Disp: 1 Tube, Rfl: 11     aspirin (ASPIRIN CHILDRENS) 81 MG chewable tablet, Take 81 mg by mouth as needed , Disp: , Rfl:      aspirin-acetaminophen-caffeine (EXCEDRIN MIGRAINE) 250-250-65 MG per tablet, Take 1 tablet by mouth every 6 hours as needed for headaches, Disp: , Rfl:      benzocaine (TOPICALE XTRA) 20 % GEL, Apply as needed locally to mouth or nasal ulcers for pain; 4 times daily as needed, Disp: 30 g, Rfl: 1     betamethasone valerate (VALISONE) 0.1 % cream, Apply topically 2 times daily, Disp: , Rfl:      botulinum toxin type A (BOTOX) 100 UNITS injection, Inject 200 Units into the muscle every 3 months, Disp: 200 Units, Rfl: 3     carbamide peroxide (DEBROX) 6.5 % otic solution, 5 drops 2 times daily, Disp: , Rfl:      clobetasol (TEMOVATE) 0.05 % cream, Apply topically 2 times daily, Disp: 60 g, Rfl: 0     colchicine (COLCYRS) 0.6 MG tablet, Take " 1.2mg in AM and 0.6mg in PM every day, Disp: 120 tablet, Rfl: 2     COMPOUNDED NON-CONTROLLED SUBSTANCE (CMPD RX) - PHARMACY TO MIX COMPOUNDED MEDICATION, Take one capsule in the morning, Disp: 30 capsule, Rfl: 0     cyproheptadine (PERIACTIN) 4 MG tablet, Take 1 tablet (4 mg) by mouth At Bedtime For nightmares, Disp: 30 tablet, Rfl: 2     diclofenac (VOLTAREN) 1 % GEL topical gel, Apply 2-4 grams to affected area(s) up to 4 times per day as needed. This is an anti-inflammatory medication., Disp: 100 g, Rfl: 4     dicyclomine (BENTYL) 20 MG tablet, Take 1 tablet (20 mg) by mouth 4 times daily as needed, Disp: 120 tablet, Rfl: 3     diltiazem 2% in PLO cream, FV COMPOUNDED, 2% GEL, Apply small pea size amount three times daily to anus until pain is gone., Disp: 30 g, Rfl: 1     diphenhydrAMINE (BENADRYL) 25 MG tablet, Take 1 tablet (25 mg) by mouth every 8 hours as needed for allergies, Disp: 30 tablet, Rfl: 0     EPINEPHrine (EPIPEN 2-EAMON) 0.3 MG/0.3ML injection, Inject 0.3 mLs (0.3 mg) into the muscle as needed for anaphylaxis, Disp: 0.6 mL, Rfl: 3     guanFACINE (TENEX) 1 MG tablet, Take 3 tablets (3 mg) by mouth twice daily morning and evening., Disp: 180 tablet, Rfl: 1     guanFACINE (TENEX) 1 MG tablet, Take 3 tablets (3 mg) by mouth 2 times daily, Disp: 270 tablet, Rfl: 3     hydrOXYzine (ATARAX) 25 MG tablet, Take 1-2 tablets (25-50 mg) by mouth every 6 hours as needed for anxiety, Disp: 90 tablet, Rfl: 2     hyoscyamine (ANASPAZ/LEVSIN) 0.125 MG tablet, Take 1-2 tablets (125-250 mcg) by mouth every 4 hours as needed for cramping, Disp: 40 tablet, Rfl: 1     hypromellose (GENTEAL) 0.3 % SOLN, 1 drop every hour as needed for dry eyes , Disp: , Rfl:      Lactase (LACTAID PO), Take by mouth daily, Disp: , Rfl:      lactulose 20 GM/30ML SOLN, Take 30 mLs by mouth 3 times daily as needed, Disp: 300 mL, Rfl: 3     lidocaine (XYLOCAINE) 2 % jelly, Apply 1 Tube topically daily Reported on 4/21/2017, Disp: , Rfl:       linaclotide (LINZESS) 145 MCG capsule, Take 1 capsule (145 mcg) by mouth every morning (before breakfast), Disp: 90 capsule, Rfl: 1     LORazepam (ATIVAN) 1 MG tablet, Take 0.5 tablets (0.5 mg) by mouth 2 times daily as needed for other (atypical chest pain) Do not operate a vehicle after taking this medication, Disp: 60 tablet, Rfl: 0     lubiprostone (AMITIZA) 24 MCG capsule, Take 1 capsule (24 mcg) by mouth 2 times daily (with meals), Disp: 30 capsule, Rfl: 1     Magic Mouthwash (FV std formula) lidocaine visc 2% 2.5mL/5mL & maalox/mylanta w/ simeth 2.5mL/5mL & diphenhydrAMINE 5mg/5mL, Swish and swallow 10 mLs in mouth every 6 hours as needed for mouth sores, Disp: 1 Bottle, Rfl: 1     norgestimate-ethinyl estradiol (SPRINTEC 28) 0.25-35 MG-MCG per tablet, Take 1 tablet by mouth daily, Disp: 84 tablet, Rfl: 4     omeprazole (PRILOSEC) 40 MG capsule, Take 1 capsule (40 mg) by mouth daily, Disp: 90 capsule, Rfl: 3     OnabotulinumtoxinA (BOTOX IJ), Inject 165 Units as directed once Lot#: /C3 Exp: 10/2020, Disp: , Rfl:      OnabotulinumtoxinA (BOTOX IJ), Inject 165 Units as directed once Lot # /C3  Exp:  10/2020, Disp: , Rfl:      OnabotulinumtoxinA (BOTOX IJ), Inject 165 Units into the muscle once Lot /C3 Exp 06/2020, Disp: , Rfl:      ondansetron (ZOFRAN) 8 MG tablet, Take 1 tablet (8 mg) by mouth every 8 hours as needed for nausea, Disp: 60 tablet, Rfl: 1     SPRINTEC 28 0.25-35 MG-MCG per tablet, 1 by mouth daily, Disp: 84 tablet, Rfl: 4     sucralfate (CARAFATE) 1 GM/10ML suspension, Take 10 mLs (1 g) by mouth 4 times daily, Disp: 1200 mL, Rfl: 2     triamcinolone (KENALOG) 0.1 % cream, Apply sparingly to oral ulcers three times daily for 14 days as needed., Disp: 15 g, Rfl: 1     Allergies   Allergen Reactions     Amoxil [Penicillins] Rash     Dad unsure of reaction.     Bee Venom Anaphylaxis     Contrast Dye Rash     Contrast Media Ready-Box [facility], 04/09/2014.; Contrast Media Ready-Box [facility] [place]09[place].  NOTE: this is a contrast media oral with iodine. Premedicate with methylpred standard for IV contrast, request barium contrast for oral contrast.     Kiwi Swelling     Orange Fruit [Citrus] Anaphylaxis     Pineapple Anaphylaxis, Difficulty breathing and Rash     Reglan [Metoclopramide] Other (See Comments)     IV dose only, in ER, rapid heart rate.     Ace Inhibitors      Difficulty in breathing and GI upset     Amitiza [Lubiprostone] Nausea and Vomiting     Amoxicillin-Pot Clavulanate      Latex      Midazolam Unknown     Other reaction(s): Unknown  parent states that when pt takes this medication, she wakes up being very violent .  parent states that when pt takes this medication, she wakes up being very violent .     No Clinical Screening - See Comments      Bleech/ chest tightness, itchy throat, swollen tongue, hives     Versed      Coming out of pelvic exam at age of 5, was kicking and screaming when coming out of the versed.     Adhesive Tape Rash     Azithromycin Hives and Rash     Cephalexin Itching and Rash     Itchy mouth  Itchy mouth     Keflex [Cephalexin-Fd&C Yellow #6] Hives        PHYSICAL EXAM:    Currently has a 5/10 headache  No facial asymmetry    HPI:    Patient reports the following new medical problems since last visit: She developed a bacterial infection in her pelvis. She also had a sleep study done, which found that she is not sleeping more than 5 hours per night. She has also been participating in physical therapy, which is giving her significant pain in her upper traps and she is subluxing her fibula. She was also diagnosed with iron deficiency anemia and will be getting iron infusions starting in the next week.     We reviewed the recommended safety guidelines for  Botox from any vaccine injection, such as the seasonal flu vaccine, by a minimum of 10-14 days with Samara Oropeza. She acknowledged understanding.    RESPONSE TO PREVIOUS TREATMENT:  Change in  headache pattern following last series of injections with 165 units of  Botox on 2017.    Post-procedural headache: Rated as 'Mild' severity.  Duration: 4 days followed by complete resolution    1.  Headache frequency during this injection cycle: 4 headache days per month.  This is compared to her baseline headache frequency of 20 headache days per month.     2.  Headache duration during this injection cycle:  Headache duration ranged from 1.5 hours to 1 days. Patient reports no episodes of multiple day headaches during this injection cycle.     3.  Headache intensity during this injection cycle:    A.  6/10  =  Typical pain level.  B.  10/10  =  Worst pain level.  C.  0/10  =  Lowest pain level.    4.  Change in headache medication usage during this injection cycle:  No changes. She will take one Excedrin migraine for a severe headache.     5.  ER Visits During This Injection Cycle:  None.    6.  Functional Performance:  Change in ADL's, social interaction, days lost from work, etc. Patient reports no days lost from work due to headache during this injection cycle      BOTULINUM NEUROTOXIN INJECTION PROCEDURES:      VERIFICATION OF PATIENT IDENTIFICATION AND PROCEDURE     Initials   Patient Name ses   Patient  ses   Procedure Verified by: kirill     Prior to the start of the procedure and with procedural staff participation, I verbally confirmed the patient s identity using two indicators, relevant allergies, that the procedure was appropriate and matched the consent or emergent situation, and that the correct equipment/implants were available. Immediately prior to starting the procedure I conducted the Time Out with the procedural staff and re-confirmed the patient s name, procedure, and site/side. (The Joint Commission universal protocol was followed.)  Yes    Sedation (Moderate or Deep): None    Above assessments performed by:  Alejandrina Bender MD      INDICATIONS FOR  PROCEDURES:  Samara Oropeza is a 22-year-old patient with chronic migraine headaches associated with cervicogenic  components.     Her baseline symptoms have been recalcitrant to oral medications and conservative therapy.  She is here today for reinjection with Botox.    GOAL OF PROCEDURE:  The goal of this procedure is to increase active range of motion, improve volitional motor control, decrease pain  and enhance functional independence.    TOTAL DOSE ADMINISTERED:  Dose Administered:  175 units  Botox (Botulinum Toxin Type A)       2:1 Dilution   Diluent Used:  0.25% Sensorcaine [facility] [id number]; 10/2019  Total Volume of Diluent Used:  3.5  ml  Lot # /C3 with Expiration Date:  11/2020  NDC #: Botox 100u ([address]-29)    Medication guide was offered to patient and was declined.    CONSENT:  The risks, benefits, and treatment options were discussed with Samara Oropeza and she agreed to proceed.    Written consent was obtained by [facility].     EQUIPMENT USED:  Needle-37mm stimulating/recording  Needle-30 gauge  EMG/NCS Machine    SKIN PREPARATION:  Skin preparation was performed using an alcohol wipe.    GUIDANCE DESCRIPTION:  Electro-myographic guidance was necessary throughout the procedure to accurately identify all areas of spastic muscles while avoiding injection of non-spastic muscles, neighboring nerves and nearby vascular structures.     AREA/MUSCLE INJECTED:  175 UNITS BOTOX = TOTAL DOSE, DILUTION 2:1 NS and 0.25% Sensorcaine     1 & 2. SHOULDER GIRDLE & NECK MUSCLES: 20 units Botox = Total Dose, 2:1 Dilution   Right Splenius - 5 units of Botox at 1 site/s.   Left Splenius - 5 units of Botox at 1 site/s.      Right Levator Scapulae - 5 units of Botox at 1 site/s (shoulder muscles).   Left Levator Scapulae - 5 units of Botox at 1 site/s (shoulder muscles).     3. HEAD & SCALP MUSCLES: 155 units Botox = Total Dose, 2:1 Dilution  Right Occipitalis - 10 units of Botox at 3 site/s.   Left Occipitalis - 10  units of Botox at 3 site/s.     Right Frontalis - 15 units of Botox at 4 site/s.  Left Frontalis - 15 units of Botox at 4 site/s.     Right Temporalis - 45 units of Botox at 8 site/s.  Left Temporalis - 45 units of Botox at 8 site/s.     Right  - 5 units of Botox at 1 site/s.              Left  - 5 units of Botox at 1 site/s.      Procerus - 5 units of Botox at 1 site/s.    RESPONSE TO PROCEDURE:  Samara Oropeza tolerated the procedure well and there were no immediate complications.   She was allowed to recover for an appropriate period of time and was discharged home in stable condition.    FOLLOW UP:  Samara Oropeza was asked to follow up by phone in 7-14 days with Marcelle Richardson PT, Care Coordinator or Vidya Dickinson RN, Care Coordinator, to report her response to this series of injections.  Based on the patient's previous response to this therapy, Samara Oropeza was rescheduled for the next series of injections in 12 weeks.    PLAN (Medication Changes, Therapy Orders, Work or Disability Issues, etc.): Patient will continue to monitor response to today's injections.     There were 25 units of Botox as unavoidable waste for this patient.      Again, thank you for allowing me to participate in the care of your patient.      Sincerely,    Frederick Bender MD

## 2018-05-31 ENCOUNTER — OFFICE VISIT (OUTPATIENT)
Dept: OPHTHALMOLOGY | Facility: CLINIC | Age: 23
End: 2018-05-31
Attending: OPHTHALMOLOGY
Payer: COMMERCIAL

## 2018-05-31 DIAGNOSIS — M35.2 BEHCET'S DISEASE (H): Primary | ICD-10-CM

## 2018-05-31 DIAGNOSIS — H04.123 DRY EYES, BILATERAL: ICD-10-CM

## 2018-05-31 PROCEDURE — G0463 HOSPITAL OUTPT CLINIC VISIT: HCPCS | Mod: ZF

## 2018-05-31 ASSESSMENT — REFRACTION_WEARINGRX
OS_CYLINDER: SPHERE
OD_CYLINDER: SPHERE
OD_SPHERE: -2.00
OS_SPHERE: -1.75

## 2018-05-31 ASSESSMENT — REFRACTION_MANIFEST
OD_SPHERE: -2.75
OS_CYLINDER: SPHERE
OS_SPHERE: -2.25
OD_CYLINDER: SPHERE

## 2018-05-31 ASSESSMENT — TONOMETRY
OD_IOP_MMHG: 22
IOP_METHOD: TONOPEN
OS_IOP_MMHG: 21

## 2018-05-31 ASSESSMENT — SLIT LAMP EXAM - LIDS
COMMENTS: NORMAL
COMMENTS: NORMAL

## 2018-05-31 ASSESSMENT — VISUAL ACUITY
METHOD: SNELLEN - LINEAR
OS_CC+: -1
OS_CC: 20/25
CORRECTION_TYPE: GLASSES
METHOD_MR_RETINOSCOPY: 1
OD_CC: 20/25

## 2018-05-31 ASSESSMENT — CONF VISUAL FIELD
OD_NORMAL: 1
METHOD: COUNTING FINGERS
OS_NORMAL: 1

## 2018-05-31 ASSESSMENT — CUP TO DISC RATIO
OS_RATIO: 0.35
OD_RATIO: 0.35

## 2018-05-31 ASSESSMENT — EXTERNAL EXAM - RIGHT EYE: OD_EXAM: NORMAL

## 2018-05-31 ASSESSMENT — EXTERNAL EXAM - LEFT EYE: OS_EXAM: NORMAL

## 2018-05-31 NOTE — NURSING NOTE
Chief Complaints and History of Present Illnesses   Patient presents with     Follow Up For     Behcets' disease     HPI    Affected eye(s):  Both   Symptoms:           Do you have eye pain now?:  No      Comments:  Last week pt noted a dark green spot in vision of RE in the morning that last 20 min. Over all vision is doing well with glasses. Pt was still doing gel drops at night BE, ran out of drops 1 week ago.     Ana Patel@ "[employer] 3:21 PM May 31, 2018

## 2018-05-31 NOTE — PROGRESS NOTES
I have confirmed the patient's and reviewed Past Medical History, Past Surgical History, Social History, Family History, Problem List, Medication List and agree with Tech note.    CC: eval for red eye     HPI: had flare up and rheumatologist started patient on a medrol does pack on Tuesday .    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 She was diagnosed based on oral/genital ulcers, joint pains, rash, and eye redness. She notes that she was started on colchicine last year, and was subsequently increased dose in March 2015. She states that it has helped. Her eyes have been red off and on, mostly at night, since December 2014. She also has floaters in each eye since that time. She has tried lubricating drops which have not helped. This has also not changed with the cochicine.    Famhx: unknown  POHx: glasses    Assessment and Plan:  1. 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 PEEWEE with AGA MARKS   - Continue AFT's four times a day and viscous ocular lubricant at bedtime    3. Myopia   - Updated Rx dispensed      Return to clinic: 1 year, sooner as needed         Umer Vogel MD PhD.  Professor & Chair

## 2018-05-31 NOTE — PROGRESS NOTES
"    Outpatient Psychiatric Progress Note    Name: Samara Oropeza   : 1995                    Primary Care Provider: Sonja Abreu, APRN, CNP - last visit 2018  Therapist: Mike Browne - last visit 2017 discharged due to attendance  Late Cancel with me 2018  Sleep Medicine Specialist last visit 2018  Rheumatology  Cardiology  Ophthalmology  Pain Specialist  Gastroenterology  Physical Therapy 2 times a week through [facility]  History of Pain clinic psychologist and provider    PHQ-9 scores:  PHQ-9 SCORE 2018   Total Score 12 14 16       TAHIR-7 scores:  TAHIR-7 SCORE 2018   Total Score 18 12 13       Answers for HPI/ROS submitted by the patient on 2018   If you checked off any problems, how difficult have these problems made it for you to do your work, take care of things at home, or get along with other people?: Extremely difficult      Patient Identification:  Patient is a 22 year old year old, partnered / significant other  Two or more races American female  who presents for return visit with me.  Patient is currently unemployed. Patient attended the session with Mary , who they agreed to have interview with. Patient prefers to be called: \"Samara\".    Interim History:    I last saw Samara Oropeza for outpatient psychiatry Return Visit on 2018.     During that appointment, we Start cyproheptadine 4 mg by mouth daily at bedtime for nightmares.    Continue Vistaril/Atarax (hydroxyzine) 25 - 50 mg by mouth up to 4 times daily for anxiety.    Continue Intuniv (guanfacine) 3 mg two times per day per primary care provider for Tourette's Syndrome.    Continue Elavil (amitriptyline) 50 mg by mouth daily at bedtime for sleep and pain per pain specialist.    Continue Ativan (lorazepam) per pain specialist.      Current medications include:   Current Outpatient Prescriptions   Medication Sig     albuterol " (PROAIR HFA/PROVENTIL HFA/VENTOLIN HFA) 108 (90 BASE) MCG/ACT Inhaler Inhale 2 puffs into the lungs every 6 hours as needed for shortness of breath / dyspnea or wheezing     amitriptyline (ELAVIL) 50 MG tablet Take 1 tablet (50 mg) by mouth At Bedtime     Apremilast (OTEZLA) 10 & 20 & 30 MG TBPK Take by mouth according to the instructions on the packet. Hold for signs of infection,any GI upset, weight loss or depression concerns, and seek medical attention.     apremilast (OTEZLA) 30 MG tablet Take 1 tablet (30 mg) by mouth 2 times daily     artificial tears OINT ophthalmic ointment 0.5 inch strip each eye at night     aspirin (ASPIRIN CHILDRENS) 81 MG chewable tablet Take 81 mg by mouth as needed      aspirin-acetaminophen-caffeine (EXCEDRIN MIGRAINE) 250-250-65 MG per tablet Take 1 tablet by mouth every 6 hours as needed for headaches     benzocaine (TOPICALE XTRA) 20 % GEL Apply as needed locally to mouth or nasal ulcers for pain; 4 times daily as needed     betamethasone valerate (VALISONE) 0.1 % cream Apply topically 2 times daily     botulinum toxin type A (BOTOX) 100 UNITS injection Inject 200 Units into the muscle every 3 months     clobetasol (TEMOVATE) 0.05 % cream Apply topically 2 times daily     colchicine (COLCYRS) 0.6 MG tablet Take 1.2mg in AM and 0.6mg in PM every day     cyproheptadine (PERIACTIN) 4 MG tablet Take 1 tablet (4 mg) by mouth At Bedtime For nightmares     diclofenac (VOLTAREN) 1 % GEL topical gel Apply 2-4 grams to affected area(s) up to 4 times per day as needed. This is an anti-inflammatory medication.     dicyclomine (BENTYL) 20 MG tablet Take 1 tablet (20 mg) by mouth 4 times daily as needed     diltiazem 2% in PLO cream, FV COMPOUNDED, 2% GEL Apply small pea size amount three times daily to anus until pain is gone.     diphenhydrAMINE (BENADRYL) 25 MG tablet Take 1 tablet (25 mg) by mouth every 8 hours as needed for allergies     EPINEPHrine (EPIPEN 2-EAMON) 0.3 MG/0.3ML injection  Inject 0.3 mLs (0.3 mg) into the muscle as needed for anaphylaxis     guanFACINE (TENEX) 1 MG tablet Take 3 tablets (3 mg) by mouth 2 times daily     hydrOXYzine (ATARAX) 25 MG tablet Take 1-2 tablets (25-50 mg) by mouth every 6 hours as needed for anxiety     hyoscyamine (ANASPAZ/LEVSIN) 0.125 MG tablet Take 1-2 tablets (125-250 mcg) by mouth every 4 hours as needed for cramping     hypromellose (GENTEAL) 0.3 % SOLN 1 drop every hour as needed for dry eyes      Lactase (LACTAID PO) Take by mouth daily     lactulose 20 GM/30ML SOLN Take 30 mLs by mouth 3 times daily as needed     lidocaine (XYLOCAINE) 2 % jelly Apply 1 Tube topically daily Reported on 4/21/2017     linaclotide (LINZESS) 145 MCG capsule Take 1 capsule (145 mcg) by mouth every morning (before breakfast)     Magic Mouthwash (FV std formula) lidocaine visc 2% 2.5mL/5mL & maalox/mylanta w/ simeth 2.5mL/5mL & diphenhydrAMINE 5mg/5mL Swish and swallow 10 mLs in mouth every 6 hours as needed for mouth sores     norgestimate-ethinyl estradiol (SPRINTEC 28) 0.25-35 MG-MCG per tablet Take 1 tablet by mouth daily     omeprazole (PRILOSEC) 40 MG capsule Take 1 capsule (40 mg) by mouth daily     OnabotulinumtoxinA (BOTOX IJ) Inject 175 Units into the muscle once Lot # /C3 with Expiration Date:  11/2020     OnabotulinumtoxinA (BOTOX IJ) Inject 165 Units as directed once Lot#: /C3  Exp: 10/2020     OnabotulinumtoxinA (BOTOX IJ) Inject 165 Units as directed once Lot # /C3   Exp:  10/2020     OnabotulinumtoxinA (BOTOX IJ) Inject 165 Units into the muscle once Lot /C3  Exp 06/2020     ondansetron (ZOFRAN) 8 MG tablet Take 1 tablet (8 mg) by mouth every 8 hours as needed for nausea     sucralfate (CARAFATE) 1 GM/10ML suspension Take 10 mLs (1 g) by mouth 4 times daily     triamcinolone (KENALOG) 0.1 % cream Apply sparingly to oral ulcers three times daily for 14 days as needed.     COMPOUNDED NON-CONTROLLED SUBSTANCE (CMPD RX) - PHARMACY TO MIX  "COMPOUNDED MEDICATION Take one capsule in the morning (Patient not taking: Reported on 6/1/2018)     LORazepam (ATIVAN) 1 MG tablet Take 0.5 tablets (0.5 mg) by mouth 2 times daily as needed for other (atypical chest pain) Do not operate a vehicle after taking this medication (Patient not taking: Reported on 6/1/2018)     [DISCONTINUED] guanFACINE (TENEX) 1 MG tablet Take 3 tablets (3 mg) by mouth twice daily morning and evening.     [DISCONTINUED] SPRINTEC 28 0.25-35 MG-MCG per tablet 1 by mouth daily     No current facility-administered medications for this visit.        The [place] Prescription Monitoring Program has been reviewed and there are no concerns about diversionary activity for controlled substances at this time.  Ativan (lorazepam) 1 mg 60 tabs filled July 2017 and October 11, 2017. Most recent fill from [facility] [place].     I was able to review most recent Primary Care Provider, specialty provider, and therapy visit notes that I have access to.     Samara Oropeza reports mood has been: \"fine for the most part\" feels \"getting more irritated easily recently\". No specific triggers except ongoing family stress and ongoing medical issues. Reports insurance troubles have added to her stress.   Anxiety has been: \"really high\" no panic, but gets frustrated and throws things. Patient reports that recently she has had nausea and vomiting with the anxiety and panic. Fiance reports that she obsesses about her stress and is unable to distract self.   Patient reports that her tics are \"painful and extremely bad\" lately due to stressors. Patient reports she cannot tell when the tics are coming. Reports people are starting to notice. None noted today.   Not taking Ativan (lorazepam) recently. Taking Vistaril/Atarax (hydroxyzine) 25 mg every morning and evening. Does not take during the day because \"do not really think about taking the medication at those times\".    Sleep has been: nightmares " "were completely gone the first month on medication, but now about 3 days per week. Has bad dreams about every other day. Does not bother her as much. These wake her up and is able to go back to sleep. No naps during the day. Last month had sleep study and following up with them. Patient reports she will have to do iron infusions to try to address iron deficiency and restlessness. Recently hard to fall asleep. Patient reports she is \"seeing things\" recently \"in the middle of the night if the light is off, I see shadows\". No command hallucinations and no day time auditory or visual hallucinations.   PHQ9 and GAD7 scores were reviewed today.   Medication side effects: Denies  Current stressors include: Occupational Difficulties, Medical Difficulties, Financial Difficulties, Recent Moving, Family Relationships, Insurance coverage changes, Recent loss of pet  Coping mechanisms and supports include: Animal Rescue, Deep Breathing, Journaling, Recently engaged     Previous medication trials include but not limited to:  Vistaril/Atarax (hydroxyzine)  Intuniv (guanfacine)  Elavil (amitriptyline)   Ativan (lorazepam)  Buspar (buspirone)   Neurontin (gabapentin)   Melatonin   Minipress (prazosin)   Cyproheptadine     Past Medical History:   Diagnosis Date     Anxiety      Arthritis      Behcet's disease (H)      Cervical adenitis May 2010     Chronic abdominal pain      Constipation, chronic 1994     Gastro-oesophageal reflux disease      Gastroparesis      Migraines      Neuromuscular disorder (H)     fibramyalgia     Palpitations      Seizure (H)      Seizures (H)     unknown etiology     Syncope      Tourette's       has a past medical history of Anxiety; Arthritis; Behcet's disease (H); Cervical adenitis (May 2010); Chronic abdominal pain; Constipation, chronic (1994); Gastro-oesophageal reflux disease; Gastroparesis; Migraines; Neuromuscular disorder (H); Palpitations; Seizure (H); Seizures (H); Syncope; and " "Tourette's.    Social History:  Current Living situation:  [place], [place]. Feels safe at home.  Current use of drugs or alcohol: Alcohol recent binge drinking episode of 10 shots and got sick, ambulance was called, so not drinking alcohol anymore.   Tobacco use: No  Caffeine: Yes - 2 products per day    Vital Signs:   /60 (BP Location: Right arm, Patient Position: Chair, Cuff Size: Adult Regular)  Pulse 118  Temp 98.2  F (36.8  C) (Oral)  Resp 22  Ht 5' 2.5\" (1.588 m)  Wt 149 lb (67.6 kg)  LMP 05/03/2018 (Approximate)  SpO2 99%  BMI 26.82 kg/m2    Labs:  Most recent laboratory results reviewed and the pertinent results include:   Orders Only on 05/15/2018   Component Date Value Ref Range Status     Ferritin 05/15/2018 10* 12 - 150 ng/mL Final     Iron 05/15/2018 164  35 - 180 ug/dL Final     Iron Binding Cap 05/15/2018 599* 240 - 430 ug/dL Final     Iron Saturation Index 05/15/2018 27  15 - 46 % Final        Review of Systems:  10 systems (general, cardiovascular, respiratory, eyes, ENT, endocrine, GI, , M/S, neurological) were reviewed. Most pertinent finding(s) is/are: chronic abdominal pain, migraines- has ongoing Botox injections, ongoing nausea, birth control changes have affected her menstrual cycles and mood, foot and knee pain. The remaining systems are all unremarkable.      Mental Status Examination:  Appearance:  awake, alert, adequately groomed, appeared stated age, wears makeup, on time  Attitude:  cooperative   Eye Contact:  fair, wears glasses  Gait and Station: Normal, No assistive Devices used and No dizziness or falls  Psychomotor Behavior:  no evidence of tardive dyskinesia, dystonia, or tics  Oriented to:  time, person, and place  Attention Span and Concentration:  Normal  Speech:  clear, coherent, regular rate, regular rhythm and fluent  Mood:   \"irritable\"  Affect: calm, blunted  Associations:  no loose associations  Thought Process:  logical, linear and goal oriented  Thought " Content:  no evidence of suicidal ideation or homicidal ideation, no evidence of psychotic thought and Appropriate to Interview- focused on medical comorbidities- somatic  Recent and Remote Memory:  Intact to interview. Not formally assessed. No amnesia.  Fund of Knowledge: appropriate  Insight:  fair  Judgment:  adequate for safety  Impulse Control:  intact      Suicide Risk Assessment:  Today Samara Oropeza reports many psychosocial stressors, anxiety, and mood lability. Ongoing psychosocial stress and medical comorbidities affect mood and anxiety. No suicidal ideation and Patient denies depression.  In addition, there are notable risk factors for self-harm, including age, anxiety, family history and comorbid medical condition of chronic pain. However, risk is mitigated by commitment to family, absence of past attempts, ability to volunteer a safety plan, history of seeking help when needed, future oriented, denies suicidal intent or plan and denies homicidal ideation, intent, or plan. Therefore, based on all available evidence including the factors cited above, Samara Oropeza does not appear to be at imminent risk for self-harm, does not meet criteria for a 72-hr hold, and therefore remains appropriate for ongoing outpatient level of care.  A thorough assessment of risk factors related to suicide and self-harm have been reviewed and are noted above. Local community safety resources reviewed and printed for patient to use if needed. There was no deceit detected, and the patient presented in a manner that was believable.       DSM5  Diagnosis:  Somatic Symptom Disorder  296.32 (F33.1) Major Depressive Disorder, Recurrent Episode, Moderate With anxious distress  300.02 (F41.1) Generalized Anxiety Disorder  Post-traumatic stress disorder (PTSD)     Medical comorbidities include:   Patient Active Problem List    Diagnosis Date Noted     Pelvic floor weakness 04/25/2018     Priority: Medium     Spells of  decreased attentiveness 12/19/2017     Priority: Medium     Mobile cecum 11/09/2017     Priority: Medium     Cecum noted in Right lower quadrant on 4/17 CT scan, and in Left upper Quadrant on CT on 11/2017.       Vitamin D deficiency 10/11/2017     Priority: Medium     How low, unknown/not found. On D when tested at 27. Starting cholecalciferol October 2017. Needs recheck.       Convulsions, unspecified convulsion type (H) 10/03/2017     Priority: Medium     Transient alteration of awareness 10/03/2017     Priority: Medium     Chronic pain syndrome 07/27/2017     Priority: Medium     Major depressive disorder, recurrent episode, moderate (H) 06/27/2017     Priority: Medium     Cervical pain 05/02/2017     Priority: Medium     Acute left ankle pain 03/31/2017     Priority: Medium     Cervical dystonia 03/28/2017     Priority: Medium     PTSD (post-traumatic stress disorder) 01/17/2017     Priority: Medium     Patellofemoral instability 10/20/2016     Priority: Medium     Fibromyalgia 08/04/2016     Priority: Medium     Rheumatoid arthritis of multiple sites without rheumatoid factor (H) 08/04/2016     Priority: Medium     Raynaud's disease without gangrene 08/04/2016     Priority: Medium     Chronic abdominal pain 08/04/2016     Priority: Medium     Palpitations 01/12/2016     Priority: Medium     On colchicine therapy 10/30/2015     Priority: Medium     Spell of shaking 05/06/2015     Priority: Medium     Migraine 02/04/2015     Priority: Medium     Problem list name updated by automated process. Provider to review       Behcet's disease (H) 12/10/2014     Priority: Medium     Headaches due to old head injury 11/12/2013     Priority: Medium     Milk protein intolerance 10/11/2013     Priority: Medium     Concussion 02/13/2013     Priority: Medium     Jan 2013, with prolonged recovery- followed by sports med         Knee pain 01/03/2013     Priority: Medium     TAHIR (generalized anxiety disorder) 06/25/2009      Priority: Medium     Tics - Tourette syndrome 05/18/2009     Priority: Medium     Followed by psychotherapy. Symptoms well managed. Originally diagnosed at [facility] neurolog[facility]. (Dr. Simpson)           IBS (irritable bowel syndrome) 05/18/2009     Priority: Medium     Seasonal allergic rhinitis 05/18/2009     Priority: Medium       Psychosocial & Contextual Factors:  Medical Comorbidities, Occupational Difficulties, Financial Difficulties    Assessment:  Samara Oropeza reports some improvement with nightmares. Patient continues to have limited improvement in symptoms of depression and anxiety. Patient spends most of the visit reviewing her medical and psychosocial stressors. Medication side effects and alternatives were reviewed. Health promotion activities recommended and reviewed today. All questions addressed. Education and counseling completed regarding risks and benefits of medications and psychotherapy options.    Continue to monitor nightmares and may augment with sleeping medication such as Desyrel/Olepto (trazodone)  mg by mouth daily at bedtime or Remeron (mirtazapine) 15-45 mg by mouth daily at bedtime. Cymbalta (duloxetine)  mg daily may also be an option for anxiety, depression, and chronic pain. Patient has been hesitant since our work together about adding a medication to treat ongoing depression and anxiety. Patient is only taking low dose of Elavil (amitriptyline) for pain. Patient has not met with her pain specialists recently. May consider increasing dosing of Elavil (amitriptyline) but I defer to those specialists. Patient notes today concerns about tics worsening. None noted today. She would like to adjust her Intuniv (guanfacine) but would like to do a very slow taper and consider seeing neurology to help with this. Will keep for today.      Recommend a Controlled Substance Agreement for all controlled medications with subsequent providers.     She has not been active in therapy  recently due to poor attendance. Patient will be establishing with a new therapist this month and verified meeting time and date today.     Discussed her options for psychiatry care as this is short term care as part of the Collaborative Care Psychiatry Service model in [place]. We have been discussing this for months. Reviewed I will be out on medical leave starting next month. Discussed return to Primary Care Provider or referral to long term community psychiatry. Patient will need long term psychiatry care for somatic symptom disorder. Referral placed today.     Treatment Plan:    Continue cyproheptadine 4 mg by mouth daily at bedtime for nightmares.    Continue Vistaril/Atarax (hydroxyzine) 25 - 50 mg by mouth up to 4 times daily for anxiety.    Continue Intuniv (guanfacine) 3 mg two times per day per primary care provider for Tourette's Syndrome.    Continue Elavil (amitriptyline) 50 mg by mouth daily at bedtime for sleep and pain per pain specialist.    Continue all other medications as reviewed per electronic medical record today.     Safety plan reviewed. To the Emergency Department as needed or call after hours crisis line at [phone] or [phone]. [place] Crisis Text Line. Text [place] to 956282 or Suicide LifeLine Chat: suicidepreventionlifeline.org/chat    Follow up with primary care provider as planned or for acute medical concerns.  Thank you for our work together in the Psychiatry Collaborative Care Model at [facility]. This is our last visit and I am referring to long term psychiatry care.     Schedule follow up with pain clinic.     Eduorahart may be used to communicate with your provider, but this is not intended to be used for emergencies.    Administrative Billing:   Time spent with patient and fiance was 30 minutes and greater than 50% of time or 30 minutes was spent in counseling and coordination of care regarding above diagnoses and treatment plan.    Patient Status:  The  patient is being referred to long term community psychiatry care and provider will provide bridging until patient is established with new community provider.     Signed:   Cata Hurst, PhD, APRN, CNP   Psychiatry

## 2018-05-31 NOTE — MR AVS SNAPSHOT
After Visit Summary   5/31/2018    Samara Oropeza    MRN: [id number]           Patient Information     Date Of Birth          1995        Visit Information        Provider Department      5/31/2018 3:15 PM U[facility]aton MD [facility]        Today's Diagnoses     Behcet's disease (HCC) - Both Eyes    -  1    Dry eyes, bilateral           Follow-ups after your visit        Follow-up notes from your care team     Return in about 1 year (around 5/31/2019).      Your next 10 appointments already scheduled     Jun 01, 2018 11:15 AM CDT   Return Visit with Cata Hurst NP   [facility] ([facility]    [address]  [address]  [facility] [phone]            Jun 18, 2018  1:00 PM CDT   (Arrive by 12:30 PM)   New Visit with Ernestina Patel [facility] ([facility]  [address] [id number] [phone]            Jul 17, 2018 10:30 AM CDT   [facility] with [facility]en [facility]    [address] [phone]            Aug 21, 2018 11:00 AM CDT   (Arrive by 10:45 AM)   Return Botox with Frederick Bender MD   [facility] ([facility])    [address] [place] [id number] [phone]            Nov 13, 2018  3:40 PM CST   (Arrive by 3:25 PM)   Return Botox with Alejandrina Hernandez MD   [facility] ([facility])    [address] [place] [id number] [phone]              Who to contact     Please call your clinic at [phone] to:    Ask questions about your health    Make or cancel appointments    Discuss your medicines    Learn  about your test results    Speak to your doctor            Additional Information About Your Visit        [employer] Information     Cellfire gives you secure access to your electronic health record. If you see a primary care provider, you can also send messages to your care team and make appointments. If you have questions, please call your primary care clinic.  If you do not have a primary care provider, please call [phone] and they will assist you.      Cellfire is an electronic gateway that provides easy, online access to your medical records. With Cellfire, you can request a clinic appointment, read your test results, renew a prescription or communicate with your care team.     To access your existing account, please contact your [facility] or call [phone] for assistance.        Care EveryWhere ID     This is your Care EveryWhere ID. This could be used by other organizations to access your [place] medical records  [id number]         Blood Pressure from Last 3 Encounters:   05/29/18 99/62   05/24/18 111/77   05/15/18 106/73    Weight from Last 3 Encounters:   05/29/18 67.6 kg (149 lb)   05/15/18 67.1 kg (148 lb)   05/08/18 67 kg (147 lb 9.6 oz)              Today, you had the following     No orders found for display       Primary Care Provider Office Phone # Fax #    SonjaEVI Huynh [phone]       [address]  [facility] [address]        Equal Access to Services     NABIL GALEANO : Hadii aad ku hadasho Soomaali, waaxda luqadaha, qaybta kaalmada adeegyada, mike salinas hayarlet rodriguez . So [facility] [phone].    ATENCIÓN: Si habla español, tiene a livingston disposición servicios gratuitos de asistencia lingüística. Llame al [phone].    We comply with applicable federal civil rights laws and [place] laws. We do not discriminate on the basis of race, color, national origin, age, disability, sex, sexual orientation, or gender  identity.            Thank you!     Thank you for choosing [facility]  for your care. Our goal is always to provide you with excellent care. Hearing back from our patients is one way we can continue to improve our services. Please take a few minutes to complete the written survey that you may receive in the mail after your visit with us. Thank you!             Your Updated Medication List - Protect others around you: Learn how to safely use, store and throw away your medicines at www.disposemymeds.org.          This list is accurate as of 5/31/18  3:46 PM.  Always use your most recent med list.                   Brand Name Dispense Instructions for use Diagnosis    albuterol 108 (90 Base) MCG/ACT Inhaler    PROAIR HFA/PROVENTIL HFA/VENTOLIN HFA    1 Inhaler    Inhale 2 puffs into the lungs every 6 hours as needed for shortness of breath / dyspnea or wheezing    Atypical chest pain       amitriptyline 50 MG tablet    ELAVIL    30 tablet    Take 1 tablet (50 mg) by mouth At Bedtime    Abdominal pain, generalized, Insomnia due to medical condition       * apremilast 30 MG tablet    OTEZLA    180 tablet    Take 1 tablet (30 mg) by mouth 2 times daily    Behcet's disease (H)       * Apremilast 10 & 20 & 30 MG Tbpk    OTEZLA    1 each    Take by mouth according to the instructions on the packet. Hold for signs of infection,any GI upset, weight loss or depression concerns, and seek medical attention.    Behcet's disease (H)       artificial tears Oint ophthalmic ointment     1 Tube    0.5 inch strip each eye at night    Bilateral dry eyes       ASPIRIN CHILDRENS 81 MG chewable tablet   Generic drug:  aspirin      Take 81 mg by mouth as needed        aspirin-acetaminophen-caffeine 250-250-65 MG per tablet    EXCEDRIN MIGRAINE     Take 1 tablet by mouth every 6 hours as needed for headaches        benzocaine 20 % Gel    TOPICALE XTRA    30 g    Apply as needed locally to mouth or nasal ulcers for pain; 4 times daily as  needed    Behcet's disease (H)       betamethasone valerate 0.1 % cream    VALISONE     Apply topically 2 times daily        * BOTOX IJ      Inject 165 Units as directed once Lot#: /C3 Exp: 10/2020        * BOTOX IJ      Inject 165 Units as directed once Lot # /C3  Exp:  10/2020        * botulinum toxin type A 100 units injection    BOTOX    200 Units    Inject 200 Units into the muscle every 3 months    Cervical dystonia       * BOTOX IJ      Inject 165 Units into the muscle once Lot /C3 Exp 06/2020        * BOTOX IJ      Inject 175 Units into the muscle once Lot # /C3 with Expiration Date:  11/2020        carbamide peroxide 6.5 % otic solution    DEBROX     5 drops 2 times daily        clobetasol 0.05 % cream    TEMOVATE    60 g    Apply topically 2 times daily    Folliculitis       colchicine 0.6 MG tablet    COLCYRS    120 tablet    Take 1.2mg in AM and 0.6mg in PM every day    Behcet's disease (H)       COMPOUNDED NON-CONTROLLED SUBSTANCE - PHARMACY TO MIX COMPOUNDED MEDICATION    CMPD RX    30 capsule    Take one capsule in the morning    Fibromyalgia       cyproheptadine 4 MG tablet    PERIACTIN    30 tablet    Take 1 tablet (4 mg) by mouth At Bedtime For nightmares    Nightmares       diclofenac 1 % Gel topical gel    VOLTAREN    100 g    Apply 2-4 grams to affected area(s) up to 4 times per day as needed. This is an anti-inflammatory medication.    Polyarthralgia       dicyclomine 20 MG tablet    BENTYL    120 tablet    Take 1 tablet (20 mg) by mouth 4 times daily as needed    Abdominal cramping       diltiazem 2% in PLO cream (FV COMPOUNDED) 2% Gel     30 g    Apply small pea size amount three times daily to anus until pain is gone.    Anal fissure       diphenhydrAMINE 25 MG tablet    BENADRYL    30 tablet    Take 1 tablet (25 mg) by mouth every 8 hours as needed for allergies        EPINEPHrine 0.3 MG/0.3ML injection 2-pack    EPIPEN 2-EAMON    0.6 mL    Inject 0.3 mLs (0.3 mg) into the  muscle as needed for anaphylaxis    Hx of bee sting allergy       * guanFACINE 1 MG tablet    TENEX    270 tablet    Take 3 tablets (3 mg) by mouth 2 times daily    Tic       * guanFACINE 1 MG tablet    TENEX    180 tablet    Take 3 tablets (3 mg) by mouth twice daily morning and evening.    Tic       hydrOXYzine 25 MG tablet    ATARAX    90 tablet    Take 1-2 tablets (25-50 mg) by mouth every 6 hours as needed for anxiety    TAHIR (generalized anxiety disorder)       hyoscyamine 0.125 MG tablet    ANASPAZ/LEVSIN    40 tablet    Take 1-2 tablets (125-250 mcg) by mouth every 4 hours as needed for cramping    Abdominal pain, generalized       hypromellose 0.3 % Soln ophthalmic solution    GENTEAL     1 drop every hour as needed for dry eyes        LACTAID PO      Take by mouth daily        lactulose 20 GM/30ML Soln     300 mL    Take 30 mLs by mouth 3 times daily as needed    Constipation, unspecified constipation type       lidocaine 2 % topical gel    XYLOCAINE     Apply 1 Tube topically daily Reported on 4/21/2017        lidocaine visc 2% 2.5mL/5mL & maalox/mylanta w/ simeth 2.5mL/5mL & diphenhydrAMINE 5mg/5mL Susp suspension    [facility]    1 Bottle    Swish and swallow 10 mLs in mouth every 6 hours as needed for mouth sores    Behcet's syndrome (H)       linaclotide 145 MCG capsule    LINZESS    90 capsule    Take 1 capsule (145 mcg) by mouth every morning (before breakfast)    Chronic idiopathic constipation       LORazepam 1 MG tablet    ATIVAN    60 tablet    Take 0.5 tablets (0.5 mg) by mouth 2 times daily as needed for other (atypical chest pain) Do not operate a vehicle after taking this medication    Chronic abdominal pain       lubiprostone 24 MCG capsule    AMITIZA    30 capsule    Take 1 capsule (24 mcg) by mouth 2 times daily (with meals)    Chronic idiopathic constipation       * norgestimate-ethinyl estradiol 0.25-35 MG-MCG per tablet    SPRINTEC 28    84 tablet    Take 1 tablet by mouth  daily    General counseling for prescription of oral contraceptives       * SPRINTEC 28 0.25-35 MG-MCG per tablet   Generic drug:  norgestimate-ethinyl estradiol     84 tablet    1 by mouth daily    General counseling for prescription of oral contraceptives       omeprazole 40 MG capsule    priLOSEC    90 capsule    Take 1 capsule (40 mg) by mouth daily    Gastroesophageal reflux disease, esophagitis presence not specified       ondansetron 8 MG tablet    ZOFRAN    60 tablet    Take 1 tablet (8 mg) by mouth every 8 hours as needed for nausea    Nausea       sucralfate 1 GM/10ML suspension    CARAFATE    1200 mL    Take 10 mLs (1 g) by mouth 4 times daily    Bile reflux gastritis, Nausea       triamcinolone 0.1 % cream    KENALOG    15 g    Apply sparingly to oral ulcers three times daily for 14 days as needed.    Behcet's syndrome (H)       * Notice:  This list has 11 medication(s) that are the same as other medications prescribed for you. Read the directions carefully, and ask your doctor or other care provider to review them with you.

## 2018-06-01 ENCOUNTER — OFFICE VISIT (OUTPATIENT)
Dept: PSYCHIATRY | Facility: CLINIC | Age: 23
End: 2018-06-01
Payer: COMMERCIAL

## 2018-06-01 VITALS
OXYGEN SATURATION: 99 % | SYSTOLIC BLOOD PRESSURE: 102 MMHG | HEART RATE: 118 BPM | DIASTOLIC BLOOD PRESSURE: 60 MMHG | RESPIRATION RATE: 22 BRPM | TEMPERATURE: 98.2 F | HEIGHT: 63 IN | BODY MASS INDEX: 26.4 KG/M2 | WEIGHT: 149 LBS

## 2018-06-01 DIAGNOSIS — F51.5 NIGHTMARES: ICD-10-CM

## 2018-06-01 DIAGNOSIS — F41.1 GAD (GENERALIZED ANXIETY DISORDER): Primary | ICD-10-CM

## 2018-06-01 DIAGNOSIS — G25.81 RESTLESS LEGS SYNDROME (RLS): ICD-10-CM

## 2018-06-01 PROBLEM — F45.1 SOMATIC SYMPTOM DISORDER: Status: ACTIVE | Noted: 2018-06-01

## 2018-06-01 LAB
ERYTHROCYTE [DISTWIDTH] IN BLOOD BY AUTOMATED COUNT: 13.9 % (ref 10–15)
HCT VFR BLD AUTO: 37 % (ref 35–47)
HGB BLD-MCNC: 11.9 G/DL (ref 11.7–15.7)
MCH RBC QN AUTO: 28 PG (ref 26.5–33)
MCHC RBC AUTO-ENTMCNC: 32.2 G/DL (ref 31.5–36.5)
MCV RBC AUTO: 87 FL (ref 78–100)
PLATELET # BLD AUTO: 257 10E9/L (ref 150–450)
RBC # BLD AUTO: 4.25 10E12/L (ref 3.8–5.2)
WBC # BLD AUTO: 4.7 10E9/L (ref 4–11)

## 2018-06-01 PROCEDURE — 99214 OFFICE O/P EST MOD 30 MIN: CPT | Performed by: NURSE PRACTITIONER

## 2018-06-01 PROCEDURE — 85027 COMPLETE CBC AUTOMATED: CPT | Performed by: INTERNAL MEDICINE

## 2018-06-01 PROCEDURE — 36415 COLL VENOUS BLD VENIPUNCTURE: CPT | Performed by: INTERNAL MEDICINE

## 2018-06-01 RX ORDER — CYPROHEPTADINE HYDROCHLORIDE 4 MG/1
4 TABLET ORAL AT BEDTIME
Qty: 30 TABLET | Refills: 2 | Status: SHIPPED | OUTPATIENT
Start: 2018-06-01 | End: 2018-12-05

## 2018-06-01 RX ORDER — HYDROXYZINE HYDROCHLORIDE 25 MG/1
25-50 TABLET, FILM COATED ORAL EVERY 6 HOURS PRN
Qty: 90 TABLET | Refills: 2 | Status: SHIPPED | OUTPATIENT
Start: 2018-06-01 | End: 2018-12-05

## 2018-06-01 ASSESSMENT — ANXIETY QUESTIONNAIRES
1. FEELING NERVOUS, ANXIOUS, OR ON EDGE: MORE THAN HALF THE DAYS
GAD7 TOTAL SCORE: 13
GAD7 TOTAL SCORE: 13
2. NOT BEING ABLE TO STOP OR CONTROL WORRYING: SEVERAL DAYS
GAD7 TOTAL SCORE: 13
6. BECOMING EASILY ANNOYED OR IRRITABLE: SEVERAL DAYS
7. FEELING AFRAID AS IF SOMETHING AWFUL MIGHT HAPPEN: SEVERAL DAYS
5. BEING SO RESTLESS THAT IT IS HARD TO SIT STILL: NEARLY EVERY DAY
3. WORRYING TOO MUCH ABOUT DIFFERENT THINGS: MORE THAN HALF THE DAYS
7. FEELING AFRAID AS IF SOMETHING AWFUL MIGHT HAPPEN: SEVERAL DAYS
4. TROUBLE RELAXING: NEARLY EVERY DAY

## 2018-06-01 ASSESSMENT — PATIENT HEALTH QUESTIONNAIRE - PHQ9
10. IF YOU CHECKED OFF ANY PROBLEMS, HOW DIFFICULT HAVE THESE PROBLEMS MADE IT FOR YOU TO DO YOUR WORK, TAKE CARE OF THINGS AT HOME, OR GET ALONG WITH OTHER PEOPLE: EXTREMELY DIFFICULT
SUM OF ALL RESPONSES TO PHQ QUESTIONS 1-9: 16
SUM OF ALL RESPONSES TO PHQ QUESTIONS 1-9: 16

## 2018-06-01 NOTE — MR AVS SNAPSHOT
After Visit Summary   6/1/2018    Samara Oropeza    MRN: [id number]           Patient Information     Date Of Birth          1995        Visit Information        Provider Department      6/1/2018 11:15 AM Cata Hurst NP [facility]        Today's Diagnoses     TAHIR (generalized anxiety disorder)    -  1    Nightmares          Care Instructions    Treatment Plan:    Continue cyproheptadine 4 mg by mouth daily at bedtime for nightmares.    Continue Vistaril/Atarax (hydroxyzine) 25 - 50 mg by mouth up to 4 times daily for anxiety.    Continue Intuniv (guanfacine) 3 mg two times per day per primary care provider for Tourette's Syndrome.    Continue Elavil (amitriptyline) 50 mg by mouth daily at bedtime for sleep and pain per pain specialist.    Continue all other medications as reviewed per electronic medical record today.     Safety plan reviewed. To the Emergency Department as needed or call after hours crisis line at [phone] or [phone]. [place] Crisis Text Line. Text [place] to 741366 or Suicide LifeLine Chat: suicidepreventionlifeline.org/chat    Follow up with primary care provider as planned or for acute medical concerns.  Thank you for our work together in the Psychiatry Collaborative Care Model at [facility]. This is our last visit and I am referring to long term psychiatry care.     Schedule follow up with pain clinic.     T[employer]hart may be used to communicate with your provider, but this is not intended to be used for emergencies.          Follow-ups after your visit        Additional Services     MENTAL HEALTH REFERRAL  - Adult; Psychiatry and Medication Management; Psychiatry; Other: Behavioral Healthcare Providers [phone]; We will contact you to schedule the appointment or please call with any questions       All scheduling is subject to the client's specific insurance plan & benefits, provider/location availability, and provider clinical  specialities.  Please arrive 15 minutes early for your first appointment and bring your completed paperwork.    Please be aware that coverage of these services is subject to the terms and limitations of your health insurance plan.  Call member services at your health plan with any benefit or coverage questions.                            Your next 10 appointments already scheduled     Jun 18, 2018  1:00 PM CDT   (Arrive by 12:30 PM)   [facility]    [facility]  [address]  [facility] [phone]            Jul 17, 2018 10:30 AM CDT   [facility]    [address] [place] [phone]            Aug 21, 2018 11:00 AM CDT   (Arrive by 10:45 AM)   Return Botox with Frederick Bender MD   [facility] ([facility])    [address] [place] [id number] [phone]            Nov 13, 2018  3:40 PM CST   (Arrive by 3:25 PM)   Return Botox with Alejandrina Hernandez MD   [facility] ([facility])    [address] [place] [address] [id number] [phone]              Who to contact     If you have questions or need follow up information about today's clinic visit or your schedule please contact [facility] directly at [phone].  Normal or non-critical lab and imaging results will be communicated to you by MyChart, letter or phone within 4 business days after the clinic has received the results. If you do not hear from us within 7 days, please contact the clinic through MyChart or phone. If you have a critical or abnormal lab result, we will notify you by phone as soon as possible.  Submit  "refill requests through Rezzcard or call your pharmacy and they will forward the refill request to us. Please allow 3 business days for your refill to be completed.          Additional Information About Your Visit        [employer] Information     Re[employer]card gives you secure access to your electronic health record. If you see a primary care provider, you can also send messages to your care team and make appointments. If you have questions, please call your primary care clinic.  If you do not have a primary care provider, please call [phone] and they will assist you.        Care EveryWhere ID     This is your Care EveryWhere ID. This could be used by other organizations to access your [place] medical records  [id number]        Your Vitals Were     Pulse Temperature Respirations Height Last Period Pulse Oximetry    118 98.2  F (36.8  C) (Oral) 22 5' 2.5\" (1.588 m) 05/03/2018 (Approximate) 99%    BMI (Body Mass Index)                   26.82 kg/m2            Blood Pressure from Last 3 Encounters:   06/01/18 102/60   05/29/18 99/62   05/24/18 111/77    Weight from Last 3 Encounters:   06/01/18 149 lb (67.6 kg)   05/29/18 149 lb (67.6 kg)   05/15/18 148 lb (67.1 kg)              We Performed the Following     MENTAL HEALTH REFERRAL  - Adult; Psychiatry and Medication Management; Psychiatry; Other: Behavioral Healthcare Providers [phone]; We will contact you to schedule the appointment or please call with any questions          Today's Medication Changes          These changes are accurate as of 6/1/18 11:59 AM.  If you have any questions, ask your nurse or doctor.               Stop taking these medicines if you haven't already. Please contact your care team if you have questions.     lubiprostone 24 MCG capsule   Commonly known as:  AMITIZA   Stopped by:  Cata Hurst NP                Where to get your medicines      These medications were sent to [facility] [place] [place] - [address]  [address] [place] [address], [place], [facility] [address]     Phone:  [phone]     cyproheptadine 4 MG tablet    hydrOXYzine 25 MG tablet                Primary Care Provider Office Phone # Fax #    EVI Cardona [phone]       [address]  [facility] [address]        Equal Access to Services     NABIL GALEANO : Hadii aad ku hadasho Soomaali, waaxda luqadaha, qaybta kaalmada adeegyada, waxay idiin hayaan adeeg kharash la'aan . So [facility] [phone].    ATENCIÓN: Si millila espron, tiene a livingston disposición servicios gratuitos de asistencia lingüística. Michaelame al [phone].    We comply with applicable federal civil rights laws and [place] laws. We do not discriminate on the basis of race, color, national origin, age, disability, sex, sexual orientation, or gender identity.            Thank you!     Thank you for choosing [facility]  for your care. Our goal is always to provide you with excellent care. Hearing back from our patients is one way we can continue to improve our services. Please take a few minutes to complete the written survey that you may receive in the mail after your visit with us. Thank you!             Your Updated Medication List - Protect others around you: Learn how to safely use, store and throw away your medicines at www.disposemymeds.org.          This list is accurate as of 6/1/18 11:59 AM.  Always use your most recent med list.                   Brand Name Dispense Instructions for use Diagnosis    albuterol 108 (90 Base) MCG/ACT Inhaler    PROAIR HFA/PROVENTIL HFA/VENTOLIN HFA    1 Inhaler    Inhale 2 puffs into the lungs every 6 hours as needed for shortness of breath / dyspnea or wheezing    Atypical chest pain       amitriptyline 50 MG tablet    ELAVIL    30 tablet    Take 1 tablet (50 mg) by mouth At Bedtime    Abdominal pain, generalized, Insomnia due to medical condition       * apremilast 30 MG tablet    OTEZLA     180 tablet    Take 1 tablet (30 mg) by mouth 2 times daily    Behcet's disease (H)       * Apremilast 10 & 20 & 30 MG Tbpk    OTEZLA    1 each    Take by mouth according to the instructions on the packet. Hold for signs of infection,any GI upset, weight loss or depression concerns, and seek medical attention.    Behcet's disease (H)       artificial tears Oint ophthalmic ointment     1 Tube    0.5 inch strip each eye at night    Bilateral dry eyes       ASPIRIN CHILDRENS 81 MG chewable tablet   Generic drug:  aspirin      Take 81 mg by mouth as needed        aspirin-acetaminophen-caffeine 250-250-65 MG per tablet    EXCEDRIN MIGRAINE     Take 1 tablet by mouth every 6 hours as needed for headaches        benzocaine 20 % Gel    TOPICALE XTRA    30 g    Apply as needed locally to mouth or nasal ulcers for pain; 4 times daily as needed    Behcet's disease (H)       betamethasone valerate 0.1 % cream    VALISONE     Apply topically 2 times daily        * BOTOX IJ      Inject 165 Units as directed once Lot#: /C3 Exp: 10/2020        * BOTOX IJ      Inject 165 Units as directed once Lot # /C3  Exp:  10/2020        * botulinum toxin type A 100 units injection    BOTOX    200 Units    Inject 200 Units into the muscle every 3 months    Cervical dystonia       * BOTOX IJ      Inject 165 Units into the muscle once Lot /C3 Exp 06/2020        * BOTOX IJ      Inject 175 Units into the muscle once Lot # /C3 with Expiration Date:  11/2020        clobetasol 0.05 % cream    TEMOVATE    60 g    Apply topically 2 times daily    Folliculitis       colchicine 0.6 MG tablet    COLCYRS    120 tablet    Take 1.2mg in AM and 0.6mg in PM every day    Behcet's disease (H)       cyproheptadine 4 MG tablet    PERIACTIN    30 tablet    Take 1 tablet (4 mg) by mouth At Bedtime For nightmares    Nightmares       diclofenac 1 % Gel topical gel    VOLTAREN    100 g    Apply 2-4 grams to affected area(s) up to 4 times per day as  needed. This is an anti-inflammatory medication.    Polyarthralgia       dicyclomine 20 MG tablet    BENTYL    120 tablet    Take 1 tablet (20 mg) by mouth 4 times daily as needed    Abdominal cramping       diltiazem 2% in PLO cream (FV COMPOUNDED) 2% Gel     30 g    Apply small pea size amount three times daily to anus until pain is gone.    Anal fissure       diphenhydrAMINE 25 MG tablet    BENADRYL    30 tablet    Take 1 tablet (25 mg) by mouth every 8 hours as needed for allergies        EPINEPHrine 0.3 MG/0.3ML injection 2-pack    EPIPEN 2-EAMON    0.6 mL    Inject 0.3 mLs (0.3 mg) into the muscle as needed for anaphylaxis    Hx of bee sting allergy       guanFACINE 1 MG tablet    TENEX    270 tablet    Take 3 tablets (3 mg) by mouth 2 times daily    Tic       hydrOXYzine 25 MG tablet    ATARAX    90 tablet    Take 1-2 tablets (25-50 mg) by mouth every 6 hours as needed for anxiety    TAHIR (generalized anxiety disorder)       hyoscyamine 0.125 MG tablet    ANASPAZ/LEVSIN    40 tablet    Take 1-2 tablets (125-250 mcg) by mouth every 4 hours as needed for cramping    Abdominal pain, generalized       hypromellose 0.3 % Soln ophthalmic solution    GENTEAL     1 drop every hour as needed for dry eyes        LACTAID PO      Take by mouth daily        lactulose 20 GM/30ML Soln     300 mL    Take 30 mLs by mouth 3 times daily as needed    Constipation, unspecified constipation type       lidocaine 2 % topical gel    XYLOCAINE     Apply 1 Tube topically daily Reported on 4/21/2017        lidocaine visc 2% 2.5mL/5mL & maalox/mylanta w/ simeth 2.5mL/5mL & diphenhydrAMINE 5mg/5mL Susp suspension    [facility]    1 Bottle    Swish and swallow 10 mLs in mouth every 6 hours as needed for mouth sores    Behcet's syndrome (H)       linaclotide 145 MCG capsule    LINZESS    90 capsule    Take 1 capsule (145 mcg) by mouth every morning (before breakfast)    Chronic idiopathic constipation       norgestimate-ethinyl  estradiol 0.25-35 MG-MCG per tablet    SPRINTEC 28    84 tablet    Take 1 tablet by mouth daily    General counseling for prescription of oral contraceptives       omeprazole 40 MG capsule    priLOSEC    90 capsule    Take 1 capsule (40 mg) by mouth daily    Gastroesophageal reflux disease, esophagitis presence not specified       ondansetron 8 MG tablet    ZOFRAN    60 tablet    Take 1 tablet (8 mg) by mouth every 8 hours as needed for nausea    Nausea       sucralfate 1 GM/10ML suspension    CARAFATE    1200 mL    Take 10 mLs (1 g) by mouth 4 times daily    Bile reflux gastritis, Nausea       triamcinolone 0.1 % cream    KENALOG    15 g    Apply sparingly to oral ulcers three times daily for 14 days as needed.    Behcet's syndrome (H)       * Notice:  This list has 7 medication(s) that are the same as other medications prescribed for you. Read the directions carefully, and ask your doctor or other care provider to review them with you.

## 2018-06-01 NOTE — NURSING NOTE
"Chief Complaint   Patient presents with     RECHECK     \"doing ok\" per pt     initial /60 (BP Location: Right arm, Patient Position: Chair, Cuff Size: Adult Regular)  Pulse 118  Temp 98.2  F (36.8  C) (Oral)  Resp 22  Ht 5' 2.5\" (1.588 m)  Wt 149 lb (67.6 kg)  LMP 05/03/2018 (Approximate)  SpO2 99%  BMI 26.82 kg/m2 Estimated body mass index is 26.82 kg/(m^2) as calculated from the following:    Height as of this encounter: 5' 2.5\" (1.588 m).    Weight as of this encounter: 149 lb (67.6 kg)..  bp completed using cuff size regular    "

## 2018-06-01 NOTE — PATIENT INSTRUCTIONS
Treatment Plan:    Continue cyproheptadine 4 mg by mouth daily at bedtime for nightmares.    Continue Vistaril/Atarax (hydroxyzine) 25 - 50 mg by mouth up to 4 times daily for anxiety.    Continue Intuniv (guanfacine) 3 mg two times per day per primary care provider for Tourette's Syndrome.    Continue Elavil (amitriptyline) 50 mg by mouth daily at bedtime for sleep and pain per pain specialist.    Continue all other medications as reviewed per electronic medical record today.     Safety plan reviewed. To the Emergency Department as needed or call after hours crisis line at [phone] or [phone]. [place] Crisis Text Line. Text [place] to 472438 or Suicide LifeLine Chat: suicidepreventionlifeline.org/chat    Follow up with primary care provider as planned or for acute medical concerns.  Thank you for our work together in the Psychiatry Collaborative Care Model at [facility]. This is our last visit and I am referring to long term psychiatry care.     Schedule follow up with pain clinic.     Baifendianhart may be used to communicate with your provider, but this is not intended to be used for emergencies.

## 2018-06-02 ASSESSMENT — ANXIETY QUESTIONNAIRES: GAD7 TOTAL SCORE: 13

## 2018-06-02 ASSESSMENT — PATIENT HEALTH QUESTIONNAIRE - PHQ9: SUM OF ALL RESPONSES TO PHQ QUESTIONS 1-9: 16

## 2018-06-04 ENCOUNTER — TELEPHONE (OUTPATIENT)
Dept: PSYCHIATRY | Facility: CLINIC | Age: 23
End: 2018-06-04

## 2018-06-04 NOTE — TELEPHONE ENCOUNTER
----- Message from Cata Pittman sent at 6/4/2018 12:11 PM CDT -----  Regarding: mental health order   Patient was contacted by [facility]. Patient was scheduled formedication management services with Sd Boyle on 6.12.18 at 9am.    Cata Pittman   , [facility]

## 2018-06-07 DIAGNOSIS — F95.9 TIC: ICD-10-CM

## 2018-06-07 RX ORDER — GUANFACINE 1 MG/1
TABLET ORAL
Qty: 180 TABLET | Refills: 1 | Status: SHIPPED | OUTPATIENT
Start: 2018-06-07 | End: 2018-08-14

## 2018-06-07 NOTE — TELEPHONE ENCOUNTER
Sonja--    Please review/sign or advise. Guanfacine 1 mg tablets not listed on refill protocol.     Thank you!  Miley Avila RN

## 2018-06-25 ENCOUNTER — MYC MEDICAL ADVICE (OUTPATIENT)
Dept: SLEEP MEDICINE | Facility: CLINIC | Age: 23
End: 2018-06-25

## 2018-06-25 DIAGNOSIS — G25.81 RESTLESS LEGS SYNDROME (RLS): ICD-10-CM

## 2018-06-25 DIAGNOSIS — E61.1 IRON DEFICIENCY: Primary | ICD-10-CM

## 2018-07-06 DIAGNOSIS — K21.9 GASTROESOPHAGEAL REFLUX DISEASE, ESOPHAGITIS PRESENCE NOT SPECIFIED: ICD-10-CM

## 2018-07-06 NOTE — TELEPHONE ENCOUNTER
Sonja--    Please review/sign or advise. Omeprazole 40 mg capsules.     Last prescribed by a provider outside of [facility].     MARIAN McintoshN RN  [facility]

## 2018-07-06 NOTE — TELEPHONE ENCOUNTER
"Requested Prescriptions   Pending Prescriptions Disp Refills     omeprazole (PRILOSEC) 40 MG capsule [Pharmacy Med Name: Omeprazole 40 MG Oral Capsule Delayed Release]    Last Written Prescription Date:  4/21/17  Last Fill Quantity: 90,  # refills: 3   Last office visit: No previous visit found with prescribing provider:  4/24/18   Future Office Visit:   Next 5 appointments (look out 90 days)     Jul 17, 2018 10:30 AM CDT   [facility] Visit with Austen Marquez MD   [facility]    [address] [address] [phone]                  90 capsule 3     Sig: Take 1 capsule (40 mg) by mouth daily    PPI Protocol Passed    7/6/2018 10:55 AM       Passed - Not on Clopidogrel (unless Pantoprazole ordered)       Passed - No diagnosis of osteoporosis on record       Passed - Recent (12 mo) or future (30 days) visit within the authorizing provider's specialty    Patient had office visit in the last 12 months or has a visit in the next 30 days with authorizing provider or within the authorizing provider's specialty.  See \"Patient Info\" tab in inbasket, or \"Choose Columns\" in Meds & Orders section of the refill encounter.           Passed - Patient is age 18 or older       Passed - No active pregnacy on record       Passed - No positive pregnancy test in past 12 months          "

## 2018-07-09 RX ORDER — OMEPRAZOLE 40 MG/1
CAPSULE, DELAYED RELEASE ORAL
Qty: 90 CAPSULE | Refills: 3 | Status: SHIPPED | OUTPATIENT
Start: 2018-07-09 | End: 2019-12-31

## 2018-07-13 NOTE — PROGRESS NOTES
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 Lilliana Miramontes- complicated migraine  # On Sprintec for birth control   # Borderline transaminase elevation    4/18 Basic blood cell counts, liver and kidney function labs within normal limits    Meets ISG 1990 criteria for Behcets. Initially, very good response to colchicine which has reduced the intensity and frequency of the oral ulcers in the past. Patient relapsed with genital ulcers and few oral ulcers in the end of 2016 and also with folliculitis in skin. Seen Derm Dr. Elliott. He recommended otezla. Otezla is working for her and she takes twice daily 30mg PO daily. Chest pain , mouth sores, hand pain, morning pain are all better when she tried otezla 30mg twice daily. Currently off of otezla since 4/18 and has noticed increased symptoms. The severity and frequency of oral and genital ulcers improved on otezla. No episodes in the last 4 weeks. Okay to resume otezla. Zofran PRN typically helps with nausea.     Colchicine current dose: 0.6 mg in AM and 0.6mg in PM daily (dose decreased 4/18). Because of GI symptoms, we tried colchicine taper and see if that improves her GI symptoms. But her GI symptoms are unchanged with colchicine taper. Patient does not think her GI symptoms are related to colchicine. Continue  colchicine 0.6mg PO BID.  Neurology investigated for seizures and cause. Hospitalized 12/17. 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     Has tried prednisone in the past, but does not tolerate well due to mood issues, and has been off prednisone for the past 6+ months. Has H/o Tourettes. Followed by Dr. Miramontes.     She continues to have GI symptoms  Colonoscopy and endoscopy negative 2018.  Has gastroparesis.  Behcets may have GI involvement but no evidence of GI inflammation at this time. Dr. Baker has evaluated her. Dr. Rollins did the endoscopies. Her abdominal pain is not related to otezla/colchicine as her abdominal symptoms were present even before they were started. This was verified with the patient.  Patient seeing [facility] currently. [facility] suggested 6 week therapy in [place] for pelvic floor muscles. Patient is not able to afford that and thinking of alternative options.     She gets visual symptoms  But there is no evidence of uveitis including posterior uveitis or retinal vasculitis, denies symptoms at today's visit. She has seen Dr. Garcia in the past and also seen Dr. Heaton around 2/16 and 9/17. Patient still getting double vision and floaters but 9/17 eye exam was stable.     She also likely has fibromyalgia. Currently managing her behcets.     For chest symptoms, she was referred to pulmonology by GI. CT chest negative for any lung issues 1/18    - Continue oral gel/Triamcinolone acetonide cream (0.1 percent in [employer]) three to four times daily during attacks of oral ulcers and be used until pain from the ulcer ceases. Potent topical corticosteroids may be used for genital ulcers. Also use magic mouthwash as needed.  - Other comorbidities to watch for in Behcets: Vascular disease in Behçet s is more common in men. Large vessel vascular involvement occurs in approximately one-third of patients with Behcet s disease. Pulmonary artery vasculitis can present with hemoptysis  (misdiagnosis can lead to poor prognosis). Secondary fibromyalgia, CNS disease, amyloidosis, sacroiliitis.     Return in about 3 months (around 10/17/2018).    No orders of the defined types were placed in this encounter.      Medications Discontinued During This Encounter   Medication Reason     guanFACINE (TENEX) 1 MG tablet      colchicine (COLCYRS) 0.6 MG tablet Reorder     Current Outpatient Prescriptions   Medication Sig Dispense Refill     albuterol (PROAIR HFA/PROVENTIL HFA/VENTOLIN HFA) 108 (90 BASE) MCG/ACT Inhaler Inhale 2 puffs into the lungs every 6 hours as needed for shortness of breath / dyspnea or wheezing 1 Inhaler 1     amitriptyline (ELAVIL) 50 MG tablet Take 1 tablet (50 mg) by mouth At Bedtime 30 tablet 11     Apremilast (OTEZLA) 10 & 20 & 30 MG TBPK Take by mouth according to the instructions on the packet. Hold for signs of infection,any GI upset, weight loss or depression concerns, and seek medical attention. 1 each 0     artificial tears OINT ophthalmic ointment 0.5 inch strip each eye at night 1 Tube 11     aspirin (ASPIRIN CHILDRENS) 81 MG chewable tablet Take 81 mg by mouth as needed        aspirin-acetaminophen-caffeine (EXCEDRIN MIGRAINE) 250-250-65 MG per tablet Take 1 tablet by mouth every 6 hours as needed for headaches       benzocaine (TOPICALE XTRA) 20 % GEL Apply as needed locally to mouth or nasal ulcers for pain; 4 times daily as needed 30 g 1     betamethasone valerate (VALISONE) 0.1 % cream Apply topically 2 times daily       botulinum toxin type A (BOTOX) 100 UNITS injection Inject 200 Units into the muscle every 3 months 200 Units 3     clobetasol (TEMOVATE) 0.05 % cream Apply topically 2 times daily 60 g 0     colchicine (COLCYRS) 0.6 MG tablet Take 0.6 mg in AM and 0.6mg in PM every day 120 tablet 2     cyproheptadine (PERIACTIN) 4 MG tablet Take 1 tablet (4 mg) by mouth At Bedtime For nightmares 30 tablet 2     diclofenac (VOLTAREN) 1 % GEL topical gel Apply 2-4 grams  to affected area(s) up to 4 times per day as needed. This is an anti-inflammatory medication. 100 g 4     dicyclomine (BENTYL) 20 MG tablet Take 1 tablet (20 mg) by mouth 4 times daily as needed 120 tablet 3     diltiazem 2% in PLO cream, FV COMPOUNDED, 2% GEL Apply small pea size amount three times daily to anus until pain is gone. 30 g 1     diphenhydrAMINE (BENADRYL) 25 MG tablet Take 1 tablet (25 mg) by mouth every 8 hours as needed for allergies 30 tablet 0     EPINEPHrine (EPIPEN 2-EAMON) 0.3 MG/0.3ML injection Inject 0.3 mLs (0.3 mg) into the muscle as needed for anaphylaxis 0.6 mL 3     guanFACINE (TENEX) 1 MG tablet Take 3 tablets (3 mg) by mouth twice daily morning and evening. 180 tablet 1     hydrOXYzine (ATARAX) 25 MG tablet Take 1-2 tablets (25-50 mg) by mouth every 6 hours as needed for anxiety 90 tablet 2     hyoscyamine (ANASPAZ/LEVSIN) 0.125 MG tablet Take 1-2 tablets (125-250 mcg) by mouth every 4 hours as needed for cramping 40 tablet 1     hypromellose (GENTEAL) 0.3 % SOLN 1 drop every hour as needed for dry eyes        Lactase (LACTAID PO) Take by mouth daily       lactulose 20 GM/30ML SOLN Take 30 mLs by mouth 3 times daily as needed 300 mL 3     lidocaine (XYLOCAINE) 2 % jelly Apply 1 Tube topically daily Reported on 4/21/2017       linaclotide (LINZESS) 145 MCG capsule Take 1 capsule (145 mcg) by mouth every morning (before breakfast) 90 capsule 1     Magic Mouthwash (FV std formula) lidocaine visc 2% 2.5mL/5mL & maalox/mylanta w/ simeth 2.5mL/5mL & diphenhydrAMINE 5mg/5mL Swish and swallow 10 mLs in mouth every 6 hours as needed for mouth sores 1 Bottle 1     norgestimate-ethinyl estradiol (SPRINTEC 28) 0.25-35 MG-MCG per tablet Take 1 tablet by mouth daily 84 tablet 4     omeprazole (PRILOSEC) 40 MG capsule Take 1 capsule (40 mg) by mouth daily 90 capsule 3     ondansetron (ZOFRAN) 8 MG tablet Take 1 tablet (8 mg) by mouth every 8 hours as needed for nausea 60 tablet 1     sucralfate  (CARAFATE) 1 GM/10ML suspension Take 10 mLs (1 g) by mouth 4 times daily 1200 mL 2     triamcinolone (KENALOG) 0.1 % cream Apply sparingly to oral ulcers three times daily for 14 days as needed. 15 g 1     apremilast (OTEZLA) 30 MG tablet Take 1 tablet (30 mg) by mouth 2 times daily 180 tablet 0     OnabotulinumtoxinA (BOTOX IJ) Inject 175 Units into the muscle once Lot # /C3 with Expiration Date:  11/2020       OnabotulinumtoxinA (BOTOX IJ) Inject 165 Units as directed once Lot#: /C3  Exp: 10/2020       OnabotulinumtoxinA (BOTOX IJ) Inject 165 Units as directed once Lot # /C3   Exp:  10/2020       OnabotulinumtoxinA (BOTOX IJ) Inject 165 Units into the muscle once Lot /C3  Exp 06/2020       [DISCONTINUED] guanFACINE (TENEX) 1 MG tablet Take 3 tablets (3 mg) by mouth 2 times daily 270 tablet 3     Austen Marquez MD.           Active Problem List:     Patient Active Problem List    Diagnosis Date Noted     Somatic symptom disorder 06/01/2018     Priority: Medium     Pelvic floor weakness 04/25/2018     Priority: Medium     Spells of decreased attentiveness 12/19/2017     Priority: Medium     Mobile cecum 11/09/2017     Priority: Medium     Cecum noted in Right lower quadrant on 4/17 CT scan, and in Left upper Quadrant on CT on 11/2017.       Vitamin D deficiency 10/11/2017     Priority: Medium     How low, unknown/not found. On D when tested at 28. Starting cholecalciferol October 2017. Needs recheck.       Convulsions, unspecified convulsion type (H) 10/03/2017     Priority: Medium     Transient alteration of awareness 10/03/2017     Priority: Medium     Chronic pain syndrome 07/27/2017     Priority: Medium     Major depressive disorder, recurrent episode, moderate (H) 06/27/2017     Priority: Medium     Cervical pain 05/02/2017     Priority: Medium     Acute left ankle pain 03/31/2017     Priority: Medium     Cervical dystonia 03/28/2017     Priority: Medium     PTSD (post-traumatic  stress disorder) 01/17/2017     Priority: Medium     Patellofemoral instability 10/20/2016     Priority: Medium     Fibromyalgia 08/04/2016     Priority: Medium     Rheumatoid arthritis of multiple sites without rheumatoid factor (H) 08/04/2016     Priority: Medium     Raynaud's disease without gangrene 08/04/2016     Priority: Medium     Chronic abdominal pain 08/04/2016     Priority: Medium     Palpitations 01/12/2016     Priority: Medium     On colchicine therapy 10/30/2015     Priority: Medium     Spell of shaking 05/06/2015     Priority: Medium     Migraine 02/04/2015     Priority: Medium     Problem list name updated by automated process. Provider to review       Behcet's disease (H) 12/10/2014     Priority: Medium     Headaches due to old head injury 11/12/2013     Priority: Medium     Milk protein intolerance 10/11/2013     Priority: Medium     Concussion 02/13/2013     Priority: Medium     Jan 2013, with prolonged recovery- followed by sports med         Knee pain 01/03/2013     Priority: Medium     TAHIR (generalized anxiety disorder) 06/25/2009     Priority: Medium     Tics - Tourette syndrome 05/18/2009     Priority: Medium     Followed by psychotherapy. Symptoms well managed. Originally diagnosed at [facility]. (Dr. Simpson)           IBS (irritable bowel syndrome) 05/18/2009     Priority: Medium     Seasonal allergic rhinitis 05/18/2009     Priority: Medium          History of Present Illness:     Chief Complaint   Patient presents with     RECHECK     Seen Dr. Marilyn [facility]ology in [facility] 10/14:    Original presentation 2014:    Ms Oropeza is a 19 y.o. female who presents with one year of presentation of painful oral ulcers (monthly) once that have worsened with two episodes in the last two months that presented with painful vulvar or vaginal ulcers (2 episodes so far every month) [not sure if they leave scar] as well that timing coincides with menses [place] [place]   ORAL ULCERS: last two  episodes were severe, painful, white base with erythematous halo, that appear and disappear in the matter of 1-2 weeks without, does not bleed and doesn't respond to any treatment used (magic mouthwash), 10-15 in number with the biggest one being dime size.     VAGINAL ULCERS: 2-3 in number between labia majora and minora that occur simultaneously with oral ulcers, painful, non bleeding ulcers that are erythematous, no pus.     FOLLICULITIS: patient reports having spots in legs specially around ankle and knee, but arms, and neck are affected as well. Small lesions that resemble ingrown hair, most are red erythematous elevated lesions, well demarcated, some with liquid that patient refers as pimple like.     SKIN RASHES: welts and red macules with well defined borders that are pruritic and non-ulcerative on chest, arms and back. Benadryl relieves.     ARTHRALGIAS: In descending order of severity: hips, knees, wrists, shoulders, neck, lumbar, fingers.   C/o morning stiffness in hips, back and neck lasting for about until noon.     Ms. Oropeza reports they present in severe flares and never fully resolve, but do get better. She has seen some swelling and warmth on the affected joints but has not noticed and redness. Has tried Tylenol, ibuprofen, and hydrocodone with not much benefit.     FEVERS: Reports 3-4 sporadic episodes per month of self limited fevers of 38 C that occur during the evenings and associate facial flushing.     HEADACHES: occur daily and are bitemporal and irradiate occipitally, pulsatile in nature, intensity varies from intense to mild, are worsened with movement. On treatment with ibuprofen and somatriptan without any positive results.     VISION CHANGES: Patient reports that suddenly she would only see a gray blotch in her right eyes and would persist like this for a day and a half. Also reports blurriness in her left eye. Presence of pain and burning sensation of eyeball with associated redness.  Has changed prescription glasses in the matter of 2 years 3 times. She has had opthalmology exam and was not suggestive of any evidence of uveitis.   She was also evaluated in ED for a dizzy spell.     ABD PAIN W/ INTERMITTENT DIARRHEA AND CONSTIPATION: Patient reports abdominal pain that is intermittent, periods of 7 days without bowel movement and feeling bloated with change of pattern to diarrhea (liquidy without blood nor mucus, non foul smelling). Has taken Bentyl, Zegrid, and rinetidine with mild clinical improvement.  She also reports small red nodules after each needle stick for blood draw.   Neurology saw her back then and was not impressed with her presentation as physical examination was normal. Also MRI brain normal: Findings: No definite hemorrhage, mass affect, midline shift, or ventriculomegaly is noted.There are a few scattered non specific white matter T2 hyperintensities. Axial diffusion weighted images are unremarkable.     The major vascular structures appear patent. There is opacification and severe mucosal thickening in the right maxillary sinus. The remaining paranasal sinuses, and mastoid air cells are clear. Orbits are unremarkable  She has + HSV-1 IGG. Seen ID. Negative for Herpes simplex virus culture. HSV IGM I/II COMBINATION SENT TO [facility]  RESULTS = <0.91  REF RANGE: <0.91 = NEGATIVE  ID did not think HSV could explain her mouth and genital sores.     CRP within normal limits. HIV negative. CBC within normal limits; UA negative. Creatinine within normal limits   CYNDIE neg.  Antigliadin neg.   ANti TTG neg.   [facility] 2014: Colonoscopy and endoscopy neg; result not available. Not sure if biopsies were done.   Raynaud's phenomenon:  Onset: about 2013  Digits: all fingers  Digital ulcers: no  Color changes: white ---> purple and red  Duration of an attack: About couple of hours per mom  Pain during attack: not clear pain but bruise like feeling  Frequency of attacks: few times a month  Family  history of Raynauds: no  GERD: very long time    No hemoptysis.   No miscarriages/ no thrombosis in past.   No family or personal history of psoriasis, ulcerative colitis or chron's disease.   No buttock pain or low back pain or stiffness in AM    Interim history:  Dec 2014- Multiple mouth ulcers and did not eat for 5 days. This coincided with periods. Colchicine 0.6mg PO BID started in Nov 2014.   Prednisone taper in Dec 20mg to 0 in 4 weeks. She also used magic mouthwash. Kenalog cream. After going to the ER, 3 days later her ulcers were better. She thinks it improved after the menstruation stopped.   LMP 3 weeks ago.   COLCHICINE HAS HELPED A LOT REDUCING THE INTENSITY OF MENSTRUATION ASSOCIATED ORAL AND VULVAR ULCERS.     Interim history: 6/15    Since last visit only two episodes of mouth sores- small sized 6   No genital ulcers since last visit.   Colchicine 1.2 mg in AM and 0.6mg in PM keeps her symptoms under control.     Admitted in 5/15:  Admission Diagnoses:  - LUE weakness  - spell  - hx of migraines  - hx of Tourette syndrome  - hx of Behcet's disease    Discharge Diagnoses:   - spell likely 2/2 anxiety  - hx of migraines  - hx of Tourette syndrome  - hx of Behcet's disease    CT and MRI brain was normal.     Seeing Dr. Lilliana Miramontes soon as outpatient.     Dr. Garcia did not find any intraocular inflammation.      Interm 10/30/2015  ED for migraine. Continues to see neuro - MRI brain without lesions noted. Saw GI - upper barium normal. May be considering repeat colo. Worsening lesions in mouth and genitals. Has had continuous lesion in mouth x 2 months. 2 genital ulcers. Had fracture of right sesamoid in foot. Continues to have low grade fevers. New folliculitis on chest, legs and arms.     Interim hx: 1/11/2016    Pt states that since last visit she continues to have oral ulcers and has noted more folliculitis-like lesions on her lower extremities.  She states that on her right lower leg she had  "a red macular spot that has since resolved.  She denies uveitis.  She states that the colchicine initially helped but then stopped working.  She takes 0.6 mg TID.  She stopped taking her prednisone last week as she feels like this hasn't helped.  She hasn't had any episodes of vaginal ulcers.      She saw GI who stated she has gastroparesis.  She is seeing Cardiology later this week for possible syncopal episodes.      Interim history 5/16  Mouth ulcers X 1 last month  Genital ulcers - none since last visit.     Bilateral MCPs pain, knee pain and low back pain +ve increased since last visit  Morning stiffness X 2 hours (increasing)   5-6/10    \"overall myalgia\" has gotten worse    C/o pseudofolliculitis lesion on anterior shins bilaterally worse    Interim history: (7/18/2016)  Patient has just started Humira for 2 doses on 7/1 and 7/15 and reported minimal improvement of her hip pain but otherwise, did not notice anything different.     She reported painful mouth ulcer once a month since last visit but noticed that it has been getting deeper.   Denied any genital ulcers.    Still has ongoing bilateral MCPs pain, knee pain but this has been intermittent and she did not have any much pain today. She reported 2-3 hours morning stiffness of both hands and pain score of 6/10 at her knees and 8/10 of her hands but currently takes no pain medication except prn ativan which she takes when her muscle is really tight.     The only concern is that she gained weight even though she has not been eating much (eat once a day) and do exercises every day for 1 hour (with video of yoga, pilates). Her mother wonders if she needs to have some labs work up include sex hormone, thyroid, cortisol.    Interval history 9/14/16  Patient was started Humira at the last visit, patient reports worsening of skin ulcers since starting Humira and stopped taking Humura for the past 2 weeks. Patient reports oral and genital ulcers has been stable " even before starting Humira and has not had any interval oral/genital ulcers. However she reports recurrent skin ulcers occurring on a daily basis that affects her chest and anterior legs. Patient also has stopped taking prednisone, she reports that she doesn't feel the prednisone helps, her last dose of prednisone was 6+ months ago. Patient also report worsening low back pain that sometimes causes her to limp.    In the interval patient also visited ED on 8/31/16 for left hand pain and what the patient describes as phlebitis, no medication or procedure was done and the patient was discharged with a splint. Patient also reported 1 episode of chest pressure that was associated with dyspnea and numbness of the left arm, she was shopping in a supermarket at the time of onset, and the pressure resolved after she took a nap.    Patient denies any infectious symptoms in the interval, no fever, chills, night sweat, cough, dysuria, denies eye pain or visual changes.    November 4, 2016  Oct - had one genital ulcer  Oral ulcers X 5- around menstruation time.   Knee pain- chronic on the left side  Dizziness spells - on and off; been to the ER for that in the past.   On and off migraines  July 2013 - s/p MPFL reconstruction plus LRL 7/2013  Morning stiffness in knee (left) X 1 hour    Severe jaw pain and chest pain- patient wondering if this is Tourette's or esophageal spasms. Cardiac workup negative.   Fever     January 13, 2017  [place] [facility] [place]ostatic syncope from dehydration.   Chest pain on and off still continues. Painful with deep breaths.   Oral ulcers - few minor ones lasting for one week;   One major one in roof of mouth lasting for about one week.   Knee pain +ve - reviewed the recent MRI with her orthopedic surgeon.   On and off migraines  otezla is approved. Still waiting to receive the meds.     March 31, 2017  Otezla current dose 15mg PO BID.   She is tolerating okay at this dose and is willing to  increase the dose further up to 30mg BID.   Her joint pains are better on otezla. Knee pain is also better.   Back and neck pain +ve 8/10 when it is worse. Intramuscular botox injections were given on Monday in PMR.   2 minor genital ulcers in the interim- resolved.   Few oral ulcers in the interim- resolved.   Nasal ulcer - resolved.   Migraines on and off.   Nausea with otezla but improving.   Dizziness and blackouts on and off. She fell once and hurt her ankle. Wearing boot in left leg.   Complete ROS negative except for above    May 17, 2017  Tolerating otezla better. Not throwing up anymore. Current dose 20mg in AM and 30mg in PM (2nd week).   Patient thinks that her oral ulcers frequency and severity are improving with otezla. Also no genital ulcers in the interim.   Currently on doxycycline for bronchitis/?pneunomia. 4 more days left.     Joint pain history  2 weeks ago/ dx with pneumonia 1 week ago/  Involved joints: all over  Pain scale: 6.5/10   Wakes the patient from sleep : Yes  Morning stiffness: Yes for 120 minutes  Meds used:otezla,colchicine     Interim history  Since last visit:  1. Infections - Yes/ pneumonia  2. New symptoms/medical problem - Yes/ thinks she may have had a mini-seizure about 10 days ago ; did not seek medical attention; did not reoccur after that. Patient seeing PCP today.  3. Any side effects from Rheum medications -vomiting a lot (resolved)  3. ER visits/Hospitalizations/surgeries - Yes  4. Last PCP visit: has an apt. today    Patient still getting double vision. Floaters present.     Therapist recommended psychiatry evaluation. Patient does not want to see psychaitry. Patient will see PCP today.     August 22, 2017  Have you ever seen a rheumatologist Yes Who You When 5/17/17    NO genital ulcers in the interim.   Mouth ulcers- three in the back of the throat and roof of the mouth last month.     Joint pain history  Onset: doing alittle worse / cut her otezla back to 30mg   Daily due to GI problems  Involved joints: all over her body. Been having more black out episodes, been having more chest pains.also has raised bumps on both legs from the knees down that itch and are painful   Pain scale:  5.5/10     Wakes the patient from sleep : Yes  Morning stiffness:Yes for 120 minutes  Meds used:otezla, colchicine (three pills daily)     Interim history  Since last visit:  1. Infections - Yes stomach issue  2. New symptoms/medical problem - No  3. Any side effects from Rheum medications -nausea from otezla  3. ER visits/Hospitalizations/surgeries - Yes, ER for foot, ankle injury from passing out and fell down the steps. Hit her head another time from passing out. Alcohol poisoning in July 4. Last PCP visit: 6/23/17 September 19, 2017  Have you ever seen a rheumatologist Yes Who You When 8/22/17  Joint pain history  Onset: pt states that she hurts everywhere for 6 days  Involved joints: all joints  Pain scale:  7/10     Wakes the patient from sleep : Yes/ not sleeping at all  Morning stiffness:Yes for 180 minutes  Meds used:colchicine, otezla, magic mouth wash, lidocaine gel  Last one week: increased joint pain; and genital ulcer  Genital lesion (dimed size) in the last one week  After botox a week ago, she had fever 101 for three days; near syncopes. Back pain; floaters  Chest pain , mouth sores, hand pain, morning pain were all better with otezla 30mg twice daily.    Interim history  Since last visit:  1. Infections - No  2. New symptoms/medical problem - No  3. Any side effects from Rheum medications -nausea from the otezla  3. ER visits/Hospitalizations/surgeries - No  4. Last PCP visit: may 2017     October 18, 2017     Have you ever seen a rheumatologist Yes Who You When 9/19/17  Joint pain history  NO mouth or genital sores in the last 4 weeks.   Medrol dosepak helped with visual symptoms but not joint pains.     Onset: pt states that she is doing better than last time with her  behcet's. Today has severe stomach pains, states that she is constipated. Pt had a seizure 2 days ago. Does have a metallic taste in her mouth  Involved joints: hands , neck, shoulders  Pain scale:  9/10 from stomach pain;      Wakes the patient from sleep : Yes  Morning stiffness:Yes for 120 minutes  Meds used:cochicine , otezla 30mg twice daily     Interim history  Since last visit:  1. Infections - No  2. New symptoms/medical problem - Yes/ the cartilage in her chest is inflamed  3. Any side effects from Rheum medications -nausea from the otezla  3. ER visits/Hospitalizations/surgeries - No  4. Last PCP visit: yesterday    January 16, 2018  Have you ever seen a rheumatologist Yes Who You When 10/18/17  Joint pain history  Onset: pt states that she was in the hospital for 5 days before maribell, they told her she had lesions in her brain in the white matter. Had blood work drawn in the ER and they told her her inflammatory markers were elevated. Was seen in the ER last week for vomiting and diarrhea, not eating. Saw Gastro. On 1/10/18. 1.5 weeks ago she had an endoscopy and colonoscopy. She has been having elbow  And hands and knee pain, loosing use of her hands. Been dropping things. Also states that she has been getting lesions up her nose  Involved joints: see above  Pain scale:  8/10     Wakes the patient from sleep : Yes  Morning stiffness:Yes for 120 minutes  Meds used:colchisine, otezla, magic mouth wash, kenolog cream, lidocaine gel     Interim history  Since last visit:  1. Infections - Yes/ had an infection in her jaw. Having sinus issues  2. New symptoms/medical problem - Yes/ states that she is having problems walking, states that she was bouncing when she was walking  3. Any side effects from Rheum medications -otezla, nausea  3. ER visits/Hospitalizations/surgeries - Yes/ see chart  4. Last PCP visit: November 2017 April 17, 2018  Have you ever seen a rheumatologist Yes Who You When  1/16/18  Joint pain history  Onset: pt states that she is not doing well. She is having increased stomach issues, only eats 2 times in 4 days unable to keep the otezla down due to vomiting . Has sleep study done in 1 week; no genital ulcers since last appointment. 6 small mouth sores since last appointment.   Involved joints: see above   Pain scale:  7/10     Wakes the patient from sleep : Yes  Morning stiffness:Yes for 60 minutes  Meds used:otezla , colchicine, diclofenac gel, magic mouthwash, lidocaine gel      Interim history  Since last visit:  1. Infections - Yes/ had a sinus infection, thinks she is getting sick now  2. New symptoms/medical problem - Yes/ neurology sent pt to cardiology. Has a heart condition but not sure what . She is seeing a ortho. Due to knee pain   3. Any side effects from Rheum medications -nausea/vomiting from the otezla   3. ER visits/Hospitalizations/surgeries - Yes/ seen in ER   4. Last PCP visit: yesterday    July 17, 2018  Have you ever seen a rheumatologist Yes Who You When 4/17/18  Joint pain history  Onset: pt Is here for a follow-up states that she started to get lesions on her legs , face and arm. Hurts all over, lower back is very painful. Right hand and wrist area are numb  Involved joints: see above  Pain scale:  7/10   worse in the morning  Wakes the patient from sleep : Yes/ does not go to sleep till late  Morning stiffness:Yes for 4-5 hours minutes  Meds used:colchicine, otezla has  Not started otezla yet due to extreme nausea      Interim history  Since last visit:  1. Infections - Yes/ had a bacterial infection in her pelvic area was hospitalized  2. New symptoms/medical problem - Yes/ hands are swelling up. Having orthopedic issues. Was having problem with her veins sticking up, and very painful. Has happened multiply times   3. Any side effects from Rheum medications -see above  3. ER visits/Hospitalizations/surgeries - Yes/ hospital and ER for bacterial  infection  4. Last PCP visit: 4/24/18         Past Medical History:     Past Medical History:   Diagnosis Date     Anxiety      Arthritis      Behcet's disease (H)      Cervical adenitis May 2010     Chronic abdominal pain      Constipation, chronic 1994     Gastro-oesophageal reflux disease      Gastroparesis      Migraines      Neuromuscular disorder (H)     fibramyalgia     Palpitations      Seizure (H)      Seizures (H)     unknown etiology     Syncope      Tourette's      Past Surgical History:   Procedure Laterality Date     ARTHROSCOPY KNEE WITH PATELLAR REALIGNMENT  7/25/2013    Procedure: ARTHROSCOPY KNEE WITH PATELLAR REALIGNMENT;  Left Knee Arthroscopy, Medial Patellofemoral Ligament Reconstruction with Allograft  ;  Surgeon: Jennifer Acevedo MD;  Location: [facility]     COLONOSCOPY  2015     DENTAL SURGERY  1996    Teeth removal     ENDOSCOPY UPPER, COLONOSCOPY, COMBINED  2005     HC ESOPH/GAS REFLUX TEST W NASAL IMPED >1 HR N/A 2/15/2017    Procedure: ESOPHAGEAL IMPEDENCE FUNCTION TEST WITH 24 HOUR PH GREATER THAN 1 HOUR;  Surgeon: Timothy Matta MD;  Location:  GI          Social History:     Social History     Occupational History     Not on file.     Social History Main Topics     Smoking status: Never Smoker     Smokeless tobacco: Never Used     Alcohol use 0.6 oz/week     1 Standard drinks or equivalent per week      Comment: rarely     Drug use: No     Sexual activity: Yes     Partners: Male     Birth control/ protection: Pill          Family History:     Family History   Problem Relation Age of Onset     Depression Mother      Neurologic Disorder Mother      Migraines, take imitrex injection.  Also in maternal grandmother.       Alcohol/Drug Father      Hypertension Father      Depression Father      Cardiovascular Maternal Grandmother      Depression Maternal Grandmother      Hypertension Maternal Grandmother      Alzheimer Disease Maternal Grandmother      Cardiovascular Maternal  Grandfather      Hypertension Maternal Grandfather      Depression Maternal Grandfather      Alcohol/Drug Maternal Grandfather      Cardiovascular Paternal Grandmother      Hypertension Paternal Grandmother      Cardiovascular Paternal Grandfather      Hypertension Paternal Grandfather      Glaucoma No family hx of      Macular Degeneration No family hx of           Allergies:     Allergies   Allergen Reactions     Amoxil [Penicillins] Rash     Dad unsure of reaction.     Bee Venom Anaphylaxis     Contrast Dye Rash     Contrast Media Ready-Box [facility] [place], 04/09/2014.; Contrast Media Ready-Box [facility] [place], 04/09/2014.  NOTE: this is a contrast media oral with iodine. Premedicate with methylpred standard for IV contrast, request barium contrast for oral contrast.     Kiwi Swelling     Orange Fruit [Citrus] Anaphylaxis     Pineapple Anaphylaxis, Difficulty breathing and Rash     Reglan [Metoclopramide] Other (See Comments)     IV dose only, in ER, rapid heart rate.     Ace Inhibitors      Difficulty in breathing and GI upset     Amitiza [Lubiprostone] Nausea and Vomiting     Amoxicillin-Pot Clavulanate      Latex      Midazolam Unknown     Other reaction(s): Unknown  parent states that when pt takes this medication, she wakes up being very violent .  parent states that when pt takes this medication, she wakes up being very violent .     No Clinical Screening - See Comments      Bleech/ chest tightness, itchy throat, swollen tongue, hives     Versed      Coming out of pelvic exam at age of 5, was kicking and screaming when coming out of the versed.     Adhesive Tape Rash     Azithromycin Hives and Rash     Cephalexin Itching and Rash     Itchy mouth  Itchy mouth     Keflex [Cephalexin-Fd&C Yellow #6] Hives          Medications:     Current Outpatient Prescriptions   Medication Sig Dispense Refill     albuterol (PROAIR HFA/PROVENTIL HFA/VENTOLIN HFA) 108 (90 BASE) MCG/ACT Inhaler Inhale 2 puffs into the lungs every 6 hours as needed  for shortness of breath / dyspnea or wheezing 1 Inhaler 1     amitriptyline (ELAVIL) 50 MG tablet Take 1 tablet (50 mg) by mouth At Bedtime 30 tablet 11     Apremilast (OTEZLA) 10 & 20 & 30 MG TBPK Take by mouth according to the instructions on the packet. Hold for signs of infection,any GI upset, weight loss or depression concerns, and seek medical attention. 1 each 0     artificial tears OINT ophthalmic ointment 0.5 inch strip each eye at night 1 Tube 11     aspirin (ASPIRIN CHILDRENS) 81 MG chewable tablet Take 81 mg by mouth as needed        aspirin-acetaminophen-caffeine (EXCEDRIN MIGRAINE) 250-250-65 MG per tablet Take 1 tablet by mouth every 6 hours as needed for headaches       benzocaine (TOPICALE XTRA) 20 % GEL Apply as needed locally to mouth or nasal ulcers for pain; 4 times daily as needed 30 g 1     betamethasone valerate (VALISONE) 0.1 % cream Apply topically 2 times daily       botulinum toxin type A (BOTOX) 100 UNITS injection Inject 200 Units into the muscle every 3 months 200 Units 3     clobetasol (TEMOVATE) 0.05 % cream Apply topically 2 times daily 60 g 0     colchicine (COLCYRS) 0.6 MG tablet Take 0.6 mg in AM and 0.6mg in PM every day 120 tablet 2     cyproheptadine (PERIACTIN) 4 MG tablet Take 1 tablet (4 mg) by mouth At Bedtime For nightmares 30 tablet 2     diclofenac (VOLTAREN) 1 % GEL topical gel Apply 2-4 grams to affected area(s) up to 4 times per day as needed. This is an anti-inflammatory medication. 100 g 4     dicyclomine (BENTYL) 20 MG tablet Take 1 tablet (20 mg) by mouth 4 times daily as needed 120 tablet 3     diltiazem 2% in PLO cream, FV COMPOUNDED, 2% GEL Apply small pea size amount three times daily to anus until pain is gone. 30 g 1     diphenhydrAMINE (BENADRYL) 25 MG tablet Take 1 tablet (25 mg) by mouth every 8 hours as needed for allergies 30 tablet 0     EPINEPHrine (EPIPEN 2-EAMON) 0.3 MG/0.3ML injection Inject 0.3 mLs (0.3 mg) into the muscle as needed for anaphylaxis  0.6 mL 3     guanFACINE (TENEX) 1 MG tablet Take 3 tablets (3 mg) by mouth twice daily morning and evening. 180 tablet 1     hydrOXYzine (ATARAX) 25 MG tablet Take 1-2 tablets (25-50 mg) by mouth every 6 hours as needed for anxiety 90 tablet 2     hyoscyamine (ANASPAZ/LEVSIN) 0.125 MG tablet Take 1-2 tablets (125-250 mcg) by mouth every 4 hours as needed for cramping 40 tablet 1     hypromellose (GENTEAL) 0.3 % SOLN 1 drop every hour as needed for dry eyes        Lactase (LACTAID PO) Take by mouth daily       lactulose 20 GM/30ML SOLN Take 30 mLs by mouth 3 times daily as needed 300 mL 3     lidocaine (XYLOCAINE) 2 % jelly Apply 1 Tube topically daily Reported on 4/21/2017       linaclotide (LINZESS) 145 MCG capsule Take 1 capsule (145 mcg) by mouth every morning (before breakfast) 90 capsule 1     Magic Mouthwash (FV std formula) lidocaine visc 2% 2.5mL/5mL & maalox/mylanta w/ simeth 2.5mL/5mL & diphenhydrAMINE 5mg/5mL Swish and swallow 10 mLs in mouth every 6 hours as needed for mouth sores 1 Bottle 1     norgestimate-ethinyl estradiol (SPRINTEC 28) 0.25-35 MG-MCG per tablet Take 1 tablet by mouth daily 84 tablet 4     omeprazole (PRILOSEC) 40 MG capsule Take 1 capsule (40 mg) by mouth daily 90 capsule 3     ondansetron (ZOFRAN) 8 MG tablet Take 1 tablet (8 mg) by mouth every 8 hours as needed for nausea 60 tablet 1     sucralfate (CARAFATE) 1 GM/10ML suspension Take 10 mLs (1 g) by mouth 4 times daily 1200 mL 2     triamcinolone (KENALOG) 0.1 % cream Apply sparingly to oral ulcers three times daily for 14 days as needed. 15 g 1     apremilast (OTEZLA) 30 MG tablet Take 1 tablet (30 mg) by mouth 2 times daily 180 tablet 0     OnabotulinumtoxinA (BOTOX IJ) Inject 175 Units into the muscle once Lot # /C3 with Expiration Date:  11/2020       OnabotulinumtoxinA (BOTOX IJ) Inject 165 Units as directed once Lot#: /C3  Exp: 10/2020       OnabotulinumtoxinA (BOTOX IJ) Inject 165 Units as directed once Lot #  "/C3   Exp:  10/2020       OnabotulinumtoxinA (BOTOX IJ) Inject 165 Units into the muscle once Lot /C3  Exp 06/2020       [DISCONTINUED] guanFACINE (TENEX) 1 MG tablet Take 3 tablets (3 mg) by mouth 2 times daily 270 tablet 3          Physical Exam:   Blood pressure 108/70, pulse 99, temperature 98.2  F (36.8  C), temperature source Oral, height 1.588 m (5' 2.5\"), weight 68.5 kg (151 lb), SpO2 99 %, not currently breastfeeding.  Wt Readings from Last 4 Encounters:   07/17/18 68.5 kg (151 lb)   06/01/18 67.6 kg (149 lb)   05/29/18 67.6 kg (149 lb)   05/15/18 67.1 kg (148 lb)     Constitutional: well-developed, appearing stated age; cooperative  Eyes: nl conjunctiva, sclera  ENT: No oral ulcer seen.   nl external ears, nose, hearing, lips, teeth, gums, throat  No mucous membrane lesions, normal saliva pool  Neck: no mass or thyroid enlargement  Resp: lungs clear to auscultation  CV: RRR, no murmurs, rubs or gallops, no edema  GI: no ABD mass; no tenderness in upper abdomen  : not tested  Lymph: no cervical, supraclavicular or epitrochlear nodes  MS: All shoulder, elbow, wrist, MCP/PIP/DIP, hip, ankle joints were examined and  found normal except tenderness on even light touch of both knees with minimal swelling but no warmth. No active synovitis or deformity. Full ROM.  Normal  strength. Fist 100%.  No dactylitis,  tenosynovitis, enthesopathy.  Skin: no nail pitting, alopecia, rash  Psych: nl judgement, orientation, memory, affect.         Data:     CBC RESULTS:   Recent Labs   Lab Test  06/06/17 2048   WBC  4.7   RBC  4.09   HGB  12.0   HCT  35.9   MCV  88   MCH  29.3   MCHC  33.4   RDW  13.1   PLT  189       Liver Function Studies -   Recent Labs   Lab Test  06/06/17 2048   PROTTOTAL  6.7*   ALBUMIN  3.8   BILITOTAL  0.3   ALKPHOS  91   AST  26   ALT  44       Creatinine   Date Value Ref Range Status   04/18/2018 0.77 0.52 - 1.04 mg/dL Final   ]    No results found for: URIC]    HLA-B27  No " results for input(s): O43VSSMHFC, B1 in the last 99291 hours.  RF/CCP  Recent Labs   Lab Test  05/06/16   1554   CCPIGG  1   RHF  <20     CYNDIE/RNP/Sm/SSA/SSB  Recent Labs   Lab Test  11/01/16   1318   TREPAB  Negative     ESR/CRP  Recent Labs   Lab Test  04/21/17   1249  04/14/17   1351  11/06/16   1341  03/11/16   1350  11/18/15   1453   SED   --    --   4  5  6   CRP  <2.9  <2.9  <2.9  <2.9  <2.9       h

## 2018-07-13 NOTE — TELEPHONE ENCOUNTER
From: Samara Oropeza  To: Dashawn Colunga MD  Sent: 6/25/2018 11:09 AM CDT  Subject: Updates about my health    Hi, I was wondering when I can start the infusions my legs been having bruises literally every where and I m not falling or bumping anything and it s been consistent for about a month now so my physical therapist noticed how and they are getting and she was asking when I get the iron infusion. Pt is getting difficult with all the bruising and it s painful and I ve also been having blackout spells again and I haven t been able to sleep anymore I get two hours a night now for two in a half weeks straight and I have no appetite and my hair is falling out a lot; I don t know if it s from the low iron I would just really like to get it started to see if it helps me feel better I am extremely exhausted.

## 2018-07-17 ENCOUNTER — OFFICE VISIT (OUTPATIENT)
Dept: RHEUMATOLOGY | Facility: CLINIC | Age: 23
End: 2018-07-17
Payer: COMMERCIAL

## 2018-07-17 VITALS
SYSTOLIC BLOOD PRESSURE: 108 MMHG | BODY MASS INDEX: 26.75 KG/M2 | DIASTOLIC BLOOD PRESSURE: 70 MMHG | HEART RATE: 99 BPM | OXYGEN SATURATION: 99 % | WEIGHT: 151 LBS | HEIGHT: 63 IN | TEMPERATURE: 98.2 F

## 2018-07-17 DIAGNOSIS — M35.2 BEHCET'S DISEASE (H): ICD-10-CM

## 2018-07-17 PROCEDURE — 99214 OFFICE O/P EST MOD 30 MIN: CPT | Performed by: INTERNAL MEDICINE

## 2018-07-17 RX ORDER — COLCHICINE 0.6 MG/1
TABLET ORAL
Qty: 120 TABLET | Refills: 2 | COMMUNITY
Start: 2018-07-17 | End: 2018-10-26

## 2018-07-17 NOTE — MR AVS SNAPSHOT
After Visit Summary   7/17/2018    Samara Oropeza    MRN: [id number]           Patient Information     Date Of Birth          1995        Visit Information        Provider Department      7/17/2018 10:30 AM Austen Marquez MD [facility]        Today's Diagnoses     Behcet's disease (H)           Follow-ups after your visit        Follow-up notes from your care team     Return in about 3 months (around 10/17/2018).      Your next 10 appointments already scheduled     Jul 26, 2018  3:30 PM CDT   (Arrive by 3:00 PM)   New Visit with Ernestina Patel [facility] ([facility])    [facility]  [address]  [address]  [facility] [id number] [phone]            Aug 21, 2018 11:00 AM CDT   (Arrive by 10:45 AM)   Return Botox with Frederick Bender MD   [facility] ([facility])    [address] [place] [id number] [phone]            Nov 13, 2018  3:40 PM CST   (Arrive by 3:25 PM)   Return Botox with Alejandrina Hernandez MD   [facility] ([facility])    [address] [place] [id number] [phone]              Who to contact     If you have questions or need follow up information about today's clinic visit or your schedule please contact [facility] directly at [phone].  Normal or non-critical lab and imaging results will be communicated to you by MyChart, letter or phone within 4 business days after the clinic has received the results. If you do not hear from us within 7 days, please contact the clinic through MyChart or phone. If you have a critical or abnormal lab result, we will notify you by phone as soon as possible.  Submit refill requests through [employer] or call your pharmacy and they will forward  "the refill request to us. Please allow 3 business days for your refill to be completed.          Additional Information About Your Visit        [employer] Information     [employer] gives you secure access to your electronic health record. If you see a primary care provider, you can also send messages to your care team and make appointments. If you have questions, please call your primary care clinic.  If you do not have a primary care provider, please call [phone] and they will assist you.        Care EveryWhere ID     This is your Care EveryWhere ID. This could be used by other organizations to access your [place] [facility] medical records  [id number]        Your Vitals Were     Pulse Temperature Height Pulse Oximetry BMI (Body Mass Index)       99 98.2  F (36.8  C) (Oral) 1.588 m (5' 2.5\") 99% 27.18 kg/m2        Blood Pressure from Last 3 Encounters:   07/17/18 108/70   06/01/18 102/60   05/29/18 99/62    Weight from Last 3 Encounters:   07/17/18 68.5 kg (151 lb)   06/01/18 67.6 kg (149 lb)   05/29/18 67.6 kg (149 lb)              Today, you had the following     No orders found for display         Today's Medication Changes          These changes are accurate as of 7/17/18 11:11 AM.  If you have any questions, ask your nurse or doctor.               These medicines have changed or have updated prescriptions.        Dose/Directions    colchicine 0.6 MG tablet   Commonly known as:  COLCYRS   This may have changed:  additional instructions   Used for:  Behcet's disease (H)   Changed by:  Austen Marquez MD        Take 0.6 mg in AM and 0.6mg in PM every day   Quantity:  120 tablet   Refills:  2       guanFACINE 1 MG tablet   Commonly known as:  TENEX   This may have changed:  Another medication with the same name was removed. Continue taking this medication, and follow the directions you see here.   Used for:  Tic   Changed by:  Austen Marquez MD        Take 3 tablets (3 mg) by mouth twice daily " morning and evening.   Quantity:  180 tablet   Refills:  1                Primary Care Provider Office Phone # Fax #    EVI Cardona [facility] [phone]       [address] [facility] [address]  [facility] [address]        Equal Access to Services     NABIL GALEANO : Hadii aad ku hadasho Soomaali, waaxda luqadaha, qaybta kaalmada adeegyada, waxay idiin hayaan adeeg khjesus lapreston . So [facility] [phone].    ATENCIÓN: Si habla español, tiene a livingston disposición servicios gratuitos de asistencia lingüística. Llame al [phone].    We comply with applicable federal civil rights laws and [place] laws. We do not discriminate on the basis of race, color, national origin, age, disability, sex, sexual orientation, or gender identity.            Thank you!     Thank you for choosing [facility]  for your care. Our goal is always to provide you with excellent care. Hearing back from our patients is one way we can continue to improve our services. Please take a few minutes to complete the written survey that you may receive in the mail after your visit with us. Thank you!             Your Updated Medication List - Protect others around you: Learn how to safely use, store and throw away your medicines at www.disposemymeds.org.          This list is accurate as of 7/17/18 11:11 AM.  Always use your most recent med list.                   Brand Name Dispense Instructions for use Diagnosis    albuterol 108 (90 Base) MCG/ACT Inhaler    PROAIR HFA/PROVENTIL HFA/VENTOLIN HFA    1 Inhaler    Inhale 2 puffs into the lungs every 6 hours as needed for shortness of breath / dyspnea or wheezing    Atypical chest pain       amitriptyline 50 MG tablet    ELAVIL    30 tablet    Take 1 tablet (50 mg) by mouth At Bedtime    Abdominal pain, generalized, Insomnia due to medical condition       * apremilast 30 MG tablet    OTEZLA    180 tablet    Take 1 tablet (30 mg) by mouth 2 times daily    Behcet's disease  (H)       * Apremilast 10 & 20 & 30 MG Tbpk    OTEZLA    1 each    Take by mouth according to the instructions on the packet. Hold for signs of infection,any GI upset, weight loss or depression concerns, and seek medical attention.    Behcet's disease (H)       artificial tears Oint ophthalmic ointment     1 Tube    0.5 inch strip each eye at night    Bilateral dry eyes       ASPIRIN CHILDRENS 81 MG chewable tablet   Generic drug:  aspirin      Take 81 mg by mouth as needed        aspirin-acetaminophen-caffeine 250-250-65 MG per tablet    EXCEDRIN MIGRAINE     Take 1 tablet by mouth every 6 hours as needed for headaches        benzocaine 20 % Gel    TOPICALE XTRA    30 g    Apply as needed locally to mouth or nasal ulcers for pain; 4 times daily as needed    Behcet's disease (H)       betamethasone valerate 0.1 % cream    VALISONE     Apply topically 2 times daily        * BOTOX IJ      Inject 165 Units as directed once Lot#: /C3 Exp: 10/2020        * BOTOX IJ      Inject 165 Units as directed once Lot # /C3  Exp:  10/2020        * botulinum toxin type A 100 units injection    BOTOX    200 Units    Inject 200 Units into the muscle every 3 months    Cervical dystonia       * BOTOX IJ      Inject 165 Units into the muscle once Lot /C3 Exp 06/2020        * BOTOX IJ      Inject 175 Units into the muscle once Lot # /C3 with Expiration Date:  11/2020        clobetasol 0.05 % cream    TEMOVATE    60 g    Apply topically 2 times daily    Folliculitis       colchicine 0.6 MG tablet    COLCYRS    120 tablet    Take 0.6 mg in AM and 0.6mg in PM every day    Behcet's disease (H)       cyproheptadine 4 MG tablet    PERIACTIN    30 tablet    Take 1 tablet (4 mg) by mouth At Bedtime For nightmares    Nightmares       diclofenac 1 % Gel topical gel    VOLTAREN    100 g    Apply 2-4 grams to affected area(s) up to 4 times per day as needed. This is an anti-inflammatory medication.    Polyarthralgia        dicyclomine 20 MG tablet    BENTYL    120 tablet    Take 1 tablet (20 mg) by mouth 4 times daily as needed    Abdominal cramping       diltiazem 2% in PLO cream (FV COMPOUNDED) 2% Gel     30 g    Apply small pea size amount three times daily to anus until pain is gone.    Anal fissure       diphenhydrAMINE 25 MG tablet    BENADRYL    30 tablet    Take 1 tablet (25 mg) by mouth every 8 hours as needed for allergies        EPINEPHrine 0.3 MG/0.3ML injection 2-pack    EPIPEN 2-EAMON    0.6 mL    Inject 0.3 mLs (0.3 mg) into the muscle as needed for anaphylaxis    Hx of bee sting allergy       guanFACINE 1 MG tablet    TENEX    180 tablet    Take 3 tablets (3 mg) by mouth twice daily morning and evening.    Tic       hydrOXYzine 25 MG tablet    ATARAX    90 tablet    Take 1-2 tablets (25-50 mg) by mouth every 6 hours as needed for anxiety    TAHIR (generalized anxiety disorder)       hyoscyamine 0.125 MG tablet    ANASPAZ/LEVSIN    40 tablet    Take 1-2 tablets (125-250 mcg) by mouth every 4 hours as needed for cramping    Abdominal pain, generalized       hypromellose 0.3 % Soln ophthalmic solution    GENTEAL     1 drop every hour as needed for dry eyes        LACTAID PO      Take by mouth daily        lactulose 20 GM/30ML Soln     300 mL    Take 30 mLs by mouth 3 times daily as needed    Constipation, unspecified constipation type       lidocaine 2 % topical gel    XYLOCAINE     Apply 1 Tube topically daily Reported on 4/21/2017        lidocaine visc 2% 2.5mL/5mL & maalox/mylanta w/ simeth 2.5mL/5mL & diphenhydrAMINE 5mg/5mL Susp suspension    [facility]    1 Bottle    Swish and swallow 10 mLs in mouth every 6 hours as needed for mouth sores    Behcet's syndrome (H)       linaclotide 145 MCG capsule    LINZESS    90 capsule    Take 1 capsule (145 mcg) by mouth every morning (before breakfast)    Chronic idiopathic constipation       norgestimate-ethinyl estradiol 0.25-35 MG-MCG per tablet    SPRINTEC 28     84 tablet    Take 1 tablet by mouth daily    General counseling for prescription of oral contraceptives       omeprazole 40 MG capsule    priLOSEC    90 capsule    Take 1 capsule (40 mg) by mouth daily    Gastroesophageal reflux disease, esophagitis presence not specified       ondansetron 8 MG tablet    ZOFRAN    60 tablet    Take 1 tablet (8 mg) by mouth every 8 hours as needed for nausea    Nausea       sucralfate 1 GM/10ML suspension    CARAFATE    1200 mL    Take 10 mLs (1 g) by mouth 4 times daily    Bile reflux gastritis, Nausea       triamcinolone 0.1 % cream    KENALOG    15 g    Apply sparingly to oral ulcers three times daily for 14 days as needed.    Behcet's syndrome (H)       * Notice:  This list has 7 medication(s) that are the same as other medications prescribed for you. Read the directions carefully, and ask your doctor or other care provider to review them with you.

## 2018-07-23 ENCOUNTER — OFFICE VISIT (OUTPATIENT)
Dept: FAMILY MEDICINE | Facility: CLINIC | Age: 23
End: 2018-07-23
Payer: COMMERCIAL

## 2018-07-23 VITALS
OXYGEN SATURATION: 97 % | TEMPERATURE: 97.8 F | HEART RATE: 112 BPM | BODY MASS INDEX: 26.57 KG/M2 | RESPIRATION RATE: 16 BRPM | SYSTOLIC BLOOD PRESSURE: 122 MMHG | DIASTOLIC BLOOD PRESSURE: 84 MMHG | WEIGHT: 147.6 LBS

## 2018-07-23 DIAGNOSIS — R10.9 CHRONIC ABDOMINAL PAIN: ICD-10-CM

## 2018-07-23 DIAGNOSIS — R56.9 CONVULSIONS, UNSPECIFIED CONVULSION TYPE (H): ICD-10-CM

## 2018-07-23 DIAGNOSIS — Q43.3 MOBILE CECUM (H): ICD-10-CM

## 2018-07-23 DIAGNOSIS — R30.0 DYSURIA: Primary | ICD-10-CM

## 2018-07-23 DIAGNOSIS — K58.1 IRRITABLE BOWEL SYNDROME WITH CONSTIPATION: ICD-10-CM

## 2018-07-23 DIAGNOSIS — G89.29 CHRONIC ABDOMINAL PAIN: ICD-10-CM

## 2018-07-23 DIAGNOSIS — F33.1 MAJOR DEPRESSIVE DISORDER, RECURRENT EPISODE, MODERATE (H): ICD-10-CM

## 2018-07-23 PROCEDURE — 99213 OFFICE O/P EST LOW 20 MIN: CPT | Performed by: NURSE PRACTITIONER

## 2018-07-23 RX ORDER — PHENAZOPYRIDINE HYDROCHLORIDE 95 MG/1
190 TABLET ORAL 3 TIMES DAILY
Qty: 24 TABLET | Refills: 1 | Status: SHIPPED | OUTPATIENT
Start: 2018-07-23 | End: 2018-12-31

## 2018-07-23 NOTE — PROGRESS NOTES
SUBJECTIVE:   Samara Oropeza is a 22 year old female who presents to clinic today for the following health issues:      Went to the urgent care on Friday, they said she had bacteria in her urine and a UTI  Was having lower back pain and pressure in pelvic area. Better the last few days. Still having pressure.  Was having fevers and nausea.   Urinating frequently and no appetite; symptoms are resolving so far, still having some pressure in her back.     Had a referral to [facility] to see GI for her chronic abdominal pain. [facility] asked her to do an abdominal pain physical therapy program through [facility], that would require her to stay in [place] for 5 weeks. She is not able to afford this currently, and they haven't been able to give her any other options. She is still interested in pursuing a second opinion for her chronic abdominal pain and mobile cecum.   -------------------------------------    Problem list and histories reviewed & adjusted, as indicated.  Additional history: as documented    Patient Active Problem List   Diagnosis     Tics - Tourette syndrome     IBS (irritable bowel syndrome)     Seasonal allergic rhinitis     TAHIR (generalized anxiety disorder)     Knee pain     Concussion     Milk protein intolerance     Headaches due to old head injury     Behcet's disease (H)     Migraine     Spell of shaking     On colchicine therapy     Palpitations     Fibromyalgia     Rheumatoid arthritis of multiple sites without rheumatoid factor (H)     Raynaud's disease without gangrene     Chronic abdominal pain     Patellofemoral instability     PTSD (post-traumatic stress disorder)     Cervical dystonia     Acute left ankle pain     Cervical pain     Major depressive disorder, recurrent episode, moderate (H)     Chronic pain syndrome     Convulsions, unspecified convulsion type (H)     Transient alteration of awareness     Vitamin D deficiency     Mobile cecum     Spells of decreased attentiveness     Pelvic floor  weakness     Somatic symptom disorder     Past Surgical History:   Procedure Laterality Date     ARTHROSCOPY KNEE WITH PATELLAR REALIGNMENT  7/25/2013    Procedure: ARTHROSCOPY KNEE WITH PATELLAR REALIGNMENT;  Left Knee Arthroscopy, Medial Patellofemoral Ligament Reconstruction with Allograft  ;  Surgeon: Jennifer Acevedo MD;  Location: [facility]     COLONOSCOPY  2015     DENTAL SURGERY  1996    Teeth removal     ENDOSCOPY UPPER, COLONOSCOPY, COMBINED  2005     HC ESOPH/GAS REFLUX TEST W NASAL IMPED >1 HR N/A 2/15/2017    Procedure: ESOPHAGEAL IMPEDENCE FUNCTION TEST WITH 24 HOUR PH GREATER THAN 1 HOUR;  Surgeon: Timothy Matta MD;  Location:  GI       Social History   Substance Use Topics     Smoking status: Never Smoker     Smokeless tobacco: Never Used     Alcohol use 0.6 oz/week     1 Standard drinks or equivalent per week      Comment: rarely     Family History   Problem Relation Age of Onset     Depression Mother      Neurologic Disorder Mother      Migraines, take imitrex injection.  Also in maternal grandmother.       Alcohol/Drug Father      Hypertension Father      Depression Father      Cardiovascular Maternal Grandmother      Depression Maternal Grandmother      Hypertension Maternal Grandmother      Alzheimer Disease Maternal Grandmother      Cardiovascular Maternal Grandfather      Hypertension Maternal Grandfather      Depression Maternal Grandfather      Alcohol/Drug Maternal Grandfather      Cardiovascular Paternal Grandmother      Hypertension Paternal Grandmother      Cardiovascular Paternal Grandfather      Hypertension Paternal Grandfather      Glaucoma No family hx of      Macular Degeneration No family hx of            Reviewed and updated as needed this visit by clinical staff       Reviewed and updated as needed this visit by Provider         ROS:  Constitutional, HEENT, cardiovascular, pulmonary, gi and gu systems are negative, except as otherwise noted.    OBJECTIVE:     /84   Pulse 112  Temp 97.8  F (36.6  C) (Oral)  Resp 16  Wt 147 lb 9.6 oz (67 kg)  LMP 06/13/2018 (Approximate)  SpO2 97%  BMI 26.57 kg/m2  Body mass index is 26.57 kg/(m^2).   GENERAL: healthy, alert and no distress  NECK: no adenopathy, no asymmetry, masses, or scars and thyroid normal to palpation  RESP: lungs clear to auscultation - no rales, rhonchi or wheezes  CV: regular rate and rhythm, normal S1 S2, no S3 or S4, no murmur, click or rub, no peripheral edema and peripheral pulses strong  ABDOMEN: soft, nontender, without hepatosplenomegaly or masses, bowel sounds normal and CVA tenderness on left    Diagnostic Test Results:  No results found for this or any previous visit (from the past 24 hour(s)).    ASSESSMENT/PLAN:     Problem List Items Addressed This Visit     Mobile cecum    Relevant Orders    GASTROENTEROLOGY ADULT REF CONSULT ONLY    Major depressive disorder, recurrent episode, moderate (H)    IBS (irritable bowel syndrome)    Relevant Medications    Magnesium Aspartate HCl 1230 MG PACK    Other Relevant Orders    GASTROENTEROLOGY ADULT REF CONSULT ONLY    Convulsions, unspecified convulsion type (H)    Chronic abdominal pain    Relevant Orders    GASTROENTEROLOGY ADULT REF CONSULT ONLY      Other Visit Diagnoses     Dysuria    -  Primary    Relevant Medications    phenazopyridine HCl (AZO URINARY PAIN RELIEF) tablet    Other Relevant Orders    *UA reflex to Microscopic and Culture ([place]; [facility]; [facility]; [facility]; [facility]; [facility] [place]; [place]; [place])         -She is currently being treated with Macrobid for UTI/ Pyelonephritis and she feels that she is improving, but continuing to have mild symptoms. She has 4 more days of antibiotics. Asked her to call back later this week if symptoms have not resolved completely or they are getting worse, and we will switch antibiotics.       EVI Cardona [facility]

## 2018-07-23 NOTE — MR AVS SNAPSHOT
After Visit Summary   7/23/2018    Samara Oropeza    MRN: [id number]           Patient Information     Date Of Birth          1995        Visit Information        Provider Department      7/23/2018 3:30 PM Sonja Abreu APRN CNP [facility]        Today's Diagnoses     Dysuria    -  1    Irritable bowel syndrome with constipation        Mobile cecum        Chronic abdominal pain           Follow-ups after your visit        Additional Services     GASTROENTEROLOGY ADULT REF CONSULT ONLY       Preferred Location: [facility] [phone]      Please be aware that coverage of these services is subject to the terms and limitations of your health insurance plan.  Call member services at your health plan with any benefit or coverage questions.  Any procedures must be performed at a [facility] facility OR coordinated by your clinic's referral office.    Please bring the following with you to your appointment:    (1) Any X-Rays, CTs or MRIs which have been performed.  Contact the facility where they were done to arrange for  prior to your scheduled appointment.    (2) List of current medications   (3) This referral request   (4) Any documents/labs given to you for this referral                  Your next 10 appointments already scheduled     Jul 26, 2018  3:30 PM CDT   (Arrive by 3:00 PM)   New Visit with Ernestina Patel [facility] ([place] [address] [place] [id number] [phone]            Aug 21, 2018 11:00 AM CDT   (Arrive by 10:45 AM)   Return Botox with Frederick Bender MD   [facility] ([facility])    [address] [place] [address]  3rd Floor  [facility] [id number] [phone]            Oct 16, 2018 10:30 AM CDT   Return Visit with Austen Marquez MD   [facility])    [address] [phone]            Nov 13, 2018  3:40 PM CST   (Arrive by 3:25 PM)   Return Botox with Alejandrina Hernandez MD   [facility] ([facility])    [address] [facility]  3rd Floor  [facility] [address] [phone]              Who to contact     If you have questions or need follow up information about today's clinic visit or your schedule please contact [facility] directly at [phone].  Normal or non-critical lab and imaging results will be communicated to you by MyChart, letter or phone within 4 business days after the clinic has received the results. If you do not hear from us within 7 days, please contact the clinic through Bidstalkhart or phone. If you have a critical or abnormal lab result, we will notify you by phone as soon as possible.  Submit refill requests through kenxus or call your pharmacy and they will forward the refill request to us. Please allow 3 business days for your refill to be completed.          Additional Information About Your Visit        Bidstalkhart Information     kenxus gives you secure access to your electronic health record. If you see a primary care provider, you can also send messages to your care team and make appointments. If you have questions, please call your primary care clinic.  If you do not have a primary care provider, please call [phone] and they will assist you.        Care EveryWhere ID     This is your Care EveryWhere ID. This could be used by other organizations to access your [facility] medical records  [id number]        Your Vitals Were     Pulse Temperature Respirations Last Period Pulse Oximetry BMI (Body Mass Index)    112 97.8  F (36.6  C) (Oral) 16 06/13/2018 (Approximate) 97% 26.57 kg/m2       Blood Pressure from Last 3 Encounters:   07/23/18 122/84   07/17/18 108/70   06/01/18 102/60    Weight from  Last 3 Encounters:   07/23/18 147 lb 9.6 oz (67 kg)   07/17/18 151 lb (68.5 kg)   06/01/18 149 lb (67.6 kg)              We Performed the Following     *UA reflex to Microscopic and Culture ([place]; [facility]; [facility]; [facility]; [facility]; [facility]; [place]; [place])     GASTROENTEROLOGY ADULT REF CONSULT ONLY          Today's Medication Changes          These changes are accurate as of 7/23/18  3:34 PM.  If you have any questions, ask your nurse or doctor.               Start taking these medicines.        Dose/Directions    Magnesium Aspartate HCl 1230 MG Pack   Used for:  Irritable bowel syndrome with constipation   Started by:  Sonja Abreu APRN CNP        Dose:  1 packet   Take 1 packet by mouth daily as needed   Quantity:  30 each   Refills:  3       phenazopyridine HCl tablet   Commonly known as:  AZO URINARY PAIN RELIEF   Used for:  Dysuria   Started by:  Sonja Abreu APRN CNP        Dose:  190 mg   Take 2 tablets (190 mg) by mouth 3 times daily   Quantity:  24 tablet   Refills:  1            Where to get your medicines      These medications were sent to [place] [id number]     Phone:  [phone]     Magnesium Aspartate HCl 1230 MG Pack    phenazopyridine HCl tablet                Primary Care Provider Office Phone # Fax #    EVI Cardona [phone]       [address]  [facility] [address]        Equal Access to Services     NABIL GALEANO : Hadii malcolm ku hadasho Soomaali, waaxda luqadaha, qaybta kaalmada adeegyada, waxay brad parsons. So [facility] [phone].    ATENCIÓN: Si habla español, tiene a livingston disposición servicios gratuitos de asistencia lingüística. Llame al [phone].    We comply with applicable federal civil rights laws and [place] laws. We do not discriminate on the basis of  race, color, national origin, age, disability, sex, sexual orientation, or gender identity.            Thank you!     Thank you for choosing [facility]  for your care. Our goal is always to provide you with excellent care. Hearing back from our patients is one way we can continue to improve our services. Please take a few minutes to complete the written survey that you may receive in the mail after your visit with us. Thank you!             Your Updated Medication List - Protect others around you: Learn how to safely use, store and throw away your medicines at www.disposemymeds.org.          This list is accurate as of 7/23/18  3:34 PM.  Always use your most recent med list.                   Brand Name Dispense Instructions for use Diagnosis    albuterol 108 (90 Base) MCG/ACT Inhaler    PROAIR HFA/PROVENTIL HFA/VENTOLIN HFA    1 Inhaler    Inhale 2 puffs into the lungs every 6 hours as needed for shortness of breath / dyspnea or wheezing    Atypical chest pain       amitriptyline 50 MG tablet    ELAVIL    30 tablet    Take 1 tablet (50 mg) by mouth At Bedtime    Abdominal pain, generalized, Insomnia due to medical condition       * apremilast 30 MG tablet    OTEZLA    180 tablet    Take 1 tablet (30 mg) by mouth 2 times daily    Behcet's disease (H)       * Apremilast 10 & 20 & 30 MG Tbpk    OTEZLA    1 each    Take by mouth according to the instructions on the packet. Hold for signs of infection,any GI upset, weight loss or depression concerns, and seek medical attention.    Behcet's disease (H)       artificial tears Oint ophthalmic ointment     1 Tube    0.5 inch strip each eye at night    Bilateral dry eyes       ASPIRIN CHILDRENS 81 MG chewable tablet   Generic drug:  aspirin      Take 81 mg by mouth as needed        aspirin-acetaminophen-caffeine 250-250-65 MG per tablet    EXCEDRIN MIGRAINE     Take 1 tablet by mouth every 6 hours as needed for headaches        benzocaine 20 % Gel    TOPICALE  XTRA    30 g    Apply as needed locally to mouth or nasal ulcers for pain; 4 times daily as needed    Behcet's disease (H)       betamethasone valerate 0.1 % cream    VALISONE     Apply topically 2 times daily        * BOTOX IJ      Inject 165 Units as directed once Lot#: /C3 Exp: 10/2020        * BOTOX IJ      Inject 165 Units as directed once Lot # /C3  Exp:  10/2020        * botulinum toxin type A 100 units injection    BOTOX    200 Units    Inject 200 Units into the muscle every 3 months    Cervical dystonia       * BOTOX IJ      Inject 165 Units into the muscle once Lot /C3 Exp 06/2020        * BOTOX IJ      Inject 175 Units into the muscle once Lot # /C3 with Expiration Date:  11/2020        clobetasol 0.05 % cream    TEMOVATE    60 g    Apply topically 2 times daily    Folliculitis       colchicine 0.6 MG tablet    COLCYRS    120 tablet    Take 0.6 mg in AM and 0.6mg in PM every day    Behcet's disease (H)       cyproheptadine 4 MG tablet    PERIACTIN    30 tablet    Take 1 tablet (4 mg) by mouth At Bedtime For nightmares    Nightmares       diclofenac 1 % Gel topical gel    VOLTAREN    100 g    Apply 2-4 grams to affected area(s) up to 4 times per day as needed. This is an anti-inflammatory medication.    Polyarthralgia       dicyclomine 20 MG tablet    BENTYL    120 tablet    Take 1 tablet (20 mg) by mouth 4 times daily as needed    Abdominal cramping       diltiazem 2% in PLO cream (FV COMPOUNDED) 2% Gel     30 g    Apply small pea size amount three times daily to anus until pain is gone.    Anal fissure       diphenhydrAMINE 25 MG tablet    BENADRYL    30 tablet    Take 1 tablet (25 mg) by mouth every 8 hours as needed for allergies        EPINEPHrine 0.3 MG/0.3ML injection 2-pack    EPIPEN 2-EAMON    0.6 mL    Inject 0.3 mLs (0.3 mg) into the muscle as needed for anaphylaxis    Hx of bee sting allergy       guanFACINE 1 MG tablet    TENEX    180 tablet    Take 3 tablets (3 mg) by mouth  twice daily morning and evening.    Tic       hydrOXYzine 25 MG tablet    ATARAX    90 tablet    Take 1-2 tablets (25-50 mg) by mouth every 6 hours as needed for anxiety    TAHIR (generalized anxiety disorder)       hyoscyamine 0.125 MG tablet    ANASPAZ/LEVSIN    40 tablet    Take 1-2 tablets (125-250 mcg) by mouth every 4 hours as needed for cramping    Abdominal pain, generalized       hypromellose 0.3 % Soln ophthalmic solution    GENTEAL     1 drop every hour as needed for dry eyes        LACTAID PO      Take by mouth daily        lactulose 20 GM/30ML Soln     300 mL    Take 30 mLs by mouth 3 times daily as needed    Constipation, unspecified constipation type       lidocaine 2 % topical gel    XYLOCAINE     Apply 1 Tube topically daily Reported on 4/21/2017        lidocaine visc 2% 2.5mL/5mL & maalox/mylanta w/ simeth 2.5mL/5mL & diphenhydrAMINE 5mg/5mL Susp suspension    [facility] Mouthwash [facility]    1 Bottle    Swish and swallow 10 mLs in mouth every 6 hours as needed for mouth sores    Behcet's syndrome (H)       linaclotide 145 MCG capsule    LINZESS    90 capsule    Take 1 capsule (145 mcg) by mouth every morning (before breakfast)    Chronic idiopathic constipation       Magnesium Aspartate HCl 1230 MG Pack     30 each    Take 1 packet by mouth daily as needed    Irritable bowel syndrome with constipation       norgestimate-ethinyl estradiol 0.25-35 MG-MCG per tablet    SPRINTEC 28    84 tablet    Take 1 tablet by mouth daily    General counseling for prescription of oral contraceptives       omeprazole 40 MG capsule    priLOSEC    90 capsule    Take 1 capsule (40 mg) by mouth daily    Gastroesophageal reflux disease, esophagitis presence not specified       ondansetron 8 MG tablet    ZOFRAN    60 tablet    Take 1 tablet (8 mg) by mouth every 8 hours as needed for nausea    Nausea       phenazopyridine HCl tablet    AZO URINARY PAIN RELIEF    24 tablet    Take 2 tablets (190 mg) by mouth 3 times daily     Dysuria       sucralfate 1 GM/10ML suspension    CARAFATE    1200 mL    Take 10 mLs (1 g) by mouth 4 times daily    Bile reflux gastritis, Nausea       triamcinolone 0.1 % cream    KENALOG    15 g    Apply sparingly to oral ulcers three times daily for 14 days as needed.    Behcet's syndrome (H)       * Notice:  This list has 7 medication(s) that are the same as other medications prescribed for you. Read the directions carefully, and ask your doctor or other care provider to review them with you.

## 2018-07-26 ENCOUNTER — OFFICE VISIT (OUTPATIENT)
Dept: PSYCHOLOGY | Facility: CLINIC | Age: 23
End: 2018-07-26
Payer: COMMERCIAL

## 2018-07-26 DIAGNOSIS — F41.1 GAD (GENERALIZED ANXIETY DISORDER): Primary | ICD-10-CM

## 2018-07-26 DIAGNOSIS — F33.1 MAJOR DEPRESSIVE DISORDER, RECURRENT EPISODE, MODERATE (H): ICD-10-CM

## 2018-07-26 PROCEDURE — 90834 PSYTX W PT 45 MINUTES: CPT | Performed by: COUNSELOR

## 2018-07-26 ASSESSMENT — ANXIETY QUESTIONNAIRES
5. BEING SO RESTLESS THAT IT IS HARD TO SIT STILL: MORE THAN HALF THE DAYS
6. BECOMING EASILY ANNOYED OR IRRITABLE: NEARLY EVERY DAY
3. WORRYING TOO MUCH ABOUT DIFFERENT THINGS: MORE THAN HALF THE DAYS
IF YOU CHECKED OFF ANY PROBLEMS ON THIS QUESTIONNAIRE, HOW DIFFICULT HAVE THESE PROBLEMS MADE IT FOR YOU TO DO YOUR WORK, TAKE CARE OF THINGS AT HOME, OR GET ALONG WITH OTHER PEOPLE: VERY DIFFICULT
7. FEELING AFRAID AS IF SOMETHING AWFUL MIGHT HAPPEN: MORE THAN HALF THE DAYS
GAD7 TOTAL SCORE: 16
1. FEELING NERVOUS, ANXIOUS, OR ON EDGE: MORE THAN HALF THE DAYS
2. NOT BEING ABLE TO STOP OR CONTROL WORRYING: MORE THAN HALF THE DAYS

## 2018-07-26 ASSESSMENT — PATIENT HEALTH QUESTIONNAIRE - PHQ9: 5. POOR APPETITE OR OVEREATING: NEARLY EVERY DAY

## 2018-07-26 NOTE — Clinical Note
Hi all, Samara Oropeza completed first intake appt for therapy. She currently meets criteria for Generalized Anxiety Disorder & Major Depressive Disorder, Recurrent Episode, Moderate. Please contact me with any questions or concerns.   Thank you, Ernestina Patel MA, [facility]

## 2018-07-26 NOTE — MR AVS SNAPSHOT
MRN:[id number]                      After Visit Summary   7/26/2018    Samara Oropeza    MRN: [id number]           Visit Information        Provider Department      7/26/2018 3:30 PM Ernestina Patel [facility] Generic      Your next 10 appointments already scheduled     Aug 03, 2018  3:30 PM CDT   Return Visit with Ernestina Patel [facility] ([facility])    [address] [address]   [phone]            Aug 21, 2018 11:00 AM CDT   (Arrive by 10:45 AM)   Return Botox with Frederick Bender MD   [facility] Rehabilitation ([facility])    [address] [place] [id number] [phone]            Aug 21, 2018  2:30 PM CDT   Return Visit with Ernestina Patel [facility] ([facility])    [place] [address] [address]   [phone]            Oct 16, 2018 10:30 AM CDT   Return Visit with Austen Marquez MD   [facility]    [address] [place] [phone]            Nov 13, 2018  3:40 PM CST   (Arrive by 3:25 PM)   Return Botox with Alejandrina Hernandez MD   [facility] ([facility])    [address] [place] [id number] [phone]              MyChart Information     GridNetworkst gives you secure access to your electronic health record. If you see a primary care provider, you can also send messages to your care team and make appointments. If you have questions, please call your primary care clinic.  If you do not have a primary care provider, please call [phone] and they will assist you.        Care EveryWhere ID     This is your Care  EveryWhere ID. This could be used by other organizations to access your [facility] medical records  [id number]        Equal Access to Services     NABIL GALEANO : Brandon Santiago, channing hurtado, clover juan, mike parsons. So [facility] [phone].    ATENCIÓN: Si habla español, tiene a livingston disposición servicios gratuitos de asistencia lingüística. Llame al [phone].    We comply with applicable federal civil rights laws and [place] laws. We do not discriminate on the basis of race, color, national origin, age, disability, sex, sexual orientation, or gender identity.
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
## 2018-07-27 ASSESSMENT — PATIENT HEALTH QUESTIONNAIRE - PHQ9: SUM OF ALL RESPONSES TO PHQ QUESTIONS 1-9: 14

## 2018-07-27 ASSESSMENT — ANXIETY QUESTIONNAIRES: GAD7 TOTAL SCORE: 16

## 2018-08-03 ENCOUNTER — MYC MEDICAL ADVICE (OUTPATIENT)
Dept: PSYCHOLOGY | Facility: CLINIC | Age: 23
End: 2018-08-03

## 2018-08-14 NOTE — PROGRESS NOTES
SUBJECTIVE:   Samara Oropeza is a 22 year old female who presents to clinic today for the following health issues:      Concern - Leg Swelling   Onset: 5 Days Ago     Description:   Bilateral Leg Swelling From Knees to Toes.    Intensity: severe    Progression of Symptoms:  same and constant    Accompanying Signs & Symptoms:  Painful    Previous history of similar problem:   Yes     Precipitating factors:   Worsened by: Heat     Alleviating factors:  Improved by: Applying Cold Packs     Therapies Tried and outcome: Hydrocortisone Cream; No Relief    Rash  Duration of complaint: 5 Days   Description:   Location: Bilateral Legs   Character: blotchy, raised, painful, red  Itching (Pruritis): YES  Progression of Symptoms:  worsening  Accompanying Signs & Symptoms:  Fever: no   Body aches or joint pain: no  Sore throat symptoms: no  Recent cold symptoms: YES- Runny Nose   History:   Previous similar rash: no  Precipitating factors:   Exposure to similar rash: no  New exposures: None   Recent travel: no  Alleviating factors: Heat   Therapies Tried and outcome: Hydrocortisone Cream, No Relief    Was bitten by mosquitos and several other bugs while camping; very itchy on both legs and having trouble sleeping.   Problem list and histories reviewed & adjusted, as indicated.  Additional history: as documented    Patient Active Problem List   Diagnosis     Tics - Tourette syndrome     IBS (irritable bowel syndrome)     Seasonal allergic rhinitis     TAHIR (generalized anxiety disorder)     Knee pain     Concussion     Milk protein intolerance     Headaches due to old head injury     Behcet's disease (H)     Migraine     Spell of shaking     On colchicine therapy     Palpitations     Fibromyalgia     Rheumatoid arthritis of multiple sites without rheumatoid factor (H)     Raynaud's disease without gangrene     Chronic abdominal pain     Patellofemoral instability     PTSD (post-traumatic stress disorder)     Cervical  dystonia     Acute left ankle pain     Cervical pain     Major depressive disorder, recurrent episode, moderate (H)     Chronic pain syndrome     Convulsions, unspecified convulsion type (H)     Transient alteration of awareness     Vitamin D deficiency     Mobile cecum     Spells of decreased attentiveness     Pelvic floor weakness     Somatic symptom disorder     Past Surgical History:   Procedure Laterality Date     ARTHROSCOPY KNEE WITH PATELLAR REALIGNMENT  7/25/2013    Procedure: ARTHROSCOPY KNEE WITH PATELLAR REALIGNMENT;  Left Knee Arthroscopy, Medial Patellofemoral Ligament Reconstruction with Allograft  ;  Surgeon: Jennifer Acevedo MD;  [facility]     COLONOSCOPY  2015     DENTAL SURGERY  1996    Teeth removal     ENDOSCOPY UPPER, COLONOSCOPY, COMBINED  2005      ESOPH/GAS REFLUX TEST W NASAL IMPED >1 HR N/A 2/15/2017    Procedure: ESOPHAGEAL IMPEDENCE FUNCTION TEST WITH 24 HOUR PH GREATER THAN 1 HOUR;  Surgeon: Timothy Matta MD;  Location:  GI       Social History   Substance Use Topics     Smoking status: Never Smoker     Smokeless tobacco: Never Used     Alcohol use 0.6 oz/week     1 Standard drinks or equivalent per week      Comment: rarely     Family History   Problem Relation Age of Onset     Depression Mother      Neurologic Disorder Mother      Migraines, take imitrex injection.  Also in maternal grandmother.       Alcohol/Drug Father      Hypertension Father      Depression Father      Cardiovascular Maternal Grandmother      Depression Maternal Grandmother      Hypertension Maternal Grandmother      Alzheimer Disease Maternal Grandmother      Cardiovascular Maternal Grandfather      Hypertension Maternal Grandfather      Depression Maternal Grandfather      Alcohol/Drug Maternal Grandfather      Cardiovascular Paternal Grandmother      Hypertension Paternal Grandmother      Cardiovascular Paternal Grandfather      Hypertension Paternal Grandfather      Glaucoma No family hx  "of      Macular Degeneration No family hx of            Reviewed and updated as needed this visit by clinical staff       Reviewed and updated as needed this visit by Provider         ROS:  Constitutional, HEENT, cardiovascular, pulmonary, gi and gu systems are negative, except as otherwise noted.    OBJECTIVE:     /82 (BP Location: Left arm, Patient Position: Sitting, Cuff Size: Adult Regular)  Pulse 104  Temp 98.4  F (36.9  C) (Oral)  Resp 12  Ht 5' 2.5\" (1.588 m)  Wt 154 lb 1.6 oz (69.9 kg)  SpO2 99%  Breastfeeding? No  BMI 27.74 kg/m2  Body mass index is 27.74 kg/(m^2).   GENERAL: healthy, alert and no distress  RESP: lungs clear to auscultation - no rales, rhonchi or wheezes  CV: regular rate and rhythm, normal S1 S2, no S3 or S4, no murmur, click or rub, no peripheral edema and peripheral pulses strong  SKIN: many bug bites on both legs with surrounding edema; scattered erythematous patches    Diagnostic Test Results:  No results found for this or any previous visit (from the past 24 hour(s)).    ASSESSMENT/PLAN:     Problem List Items Addressed This Visit     None      Visit Diagnoses     Urticaria    -  Primary    Relevant Medications    predniSONE (DELTASONE) 20 MG tablet    triamcinolone (KENALOG) 0.1 % cream    hydrOXYzine (ATARAX) 25 MG tablet    Behcet's syndrome (H)        Relevant Medications    triamcinolone (KENALOG) 0.1 % cream         Samara would like to try triamcinolone on bug bites first to see if this relieves itching, and will try prednisone next if she does not get any relief. Given prescription for hydroxyzine as well. EVI Cardona [facility]    "

## 2018-08-15 ENCOUNTER — OFFICE VISIT (OUTPATIENT)
Dept: FAMILY MEDICINE | Facility: CLINIC | Age: 23
End: 2018-08-15
Payer: COMMERCIAL

## 2018-08-15 VITALS
OXYGEN SATURATION: 99 % | TEMPERATURE: 98.4 F | SYSTOLIC BLOOD PRESSURE: 124 MMHG | HEART RATE: 104 BPM | DIASTOLIC BLOOD PRESSURE: 82 MMHG | WEIGHT: 154.1 LBS | BODY MASS INDEX: 27.3 KG/M2 | RESPIRATION RATE: 12 BRPM | HEIGHT: 63 IN

## 2018-08-15 DIAGNOSIS — L50.9 URTICARIA: Primary | ICD-10-CM

## 2018-08-15 DIAGNOSIS — M35.2 BEHCET'S SYNDROME (H): ICD-10-CM

## 2018-08-15 PROCEDURE — 99213 OFFICE O/P EST LOW 20 MIN: CPT | Performed by: NURSE PRACTITIONER

## 2018-08-15 RX ORDER — HYDROXYZINE HYDROCHLORIDE 25 MG/1
25-50 TABLET, FILM COATED ORAL EVERY 6 HOURS PRN
Qty: 60 TABLET | Refills: 1 | Status: SHIPPED | OUTPATIENT
Start: 2018-08-15 | End: 2018-10-14

## 2018-08-15 RX ORDER — PREDNISONE 20 MG/1
20 TABLET ORAL 2 TIMES DAILY
Qty: 10 TABLET | Refills: 0 | Status: SHIPPED | OUTPATIENT
Start: 2018-08-15 | End: 2019-01-09

## 2018-08-15 RX ORDER — TRIAMCINOLONE ACETONIDE 1 MG/G
CREAM TOPICAL
Qty: 80 G | Refills: 1 | Status: ON HOLD | OUTPATIENT
Start: 2018-08-15 | End: 2019-03-15

## 2018-08-15 NOTE — MR AVS SNAPSHOT
After Visit Summary   8/15/2018    Samara Oropeza    MRN: [id number]           Patient Information     Date Of Birth          1995        Visit Information        Provider Department      8/15/2018 3:00 PM Sonja Abreu APRN CNP [facility]        Today's Diagnoses     Urticaria    -  1    Behcet's syndrome (H)           Follow-ups after your visit        Your next 10 appointments already scheduled     Aug 21, 2018 11:00 AM CDT   (Arrive by 10:45 AM)   Return Botox with Frederick Bender MD   [facility] ([facility])    [address] [place] [id number] [phone]            Aug 21, 2018  2:30 PM CDT   Return Visit with Ernestina Patel [facility] ([facility]  [address] [facility]  [facility] [address]   [phone]            Oct 16, 2018 10:30 AM CDT   Return Visit with Austen Marquez MD   [facility] ([facility])    [address] [place] [id number] [phone]            Nov 13, 2018  3:40 PM CST   (Arrive by 3:25 PM)   Return Botox with Alejandrina Hernandez MD   [facility] ([facility])    [address] [place] [id number] [phone]              Who to contact     If you have questions or need follow up information about today's clinic visit or your schedule please contact [facility] directly at [phone].  Normal or non-critical lab and imaging results will be communicated to you by MyChart, letter or phone within 4 business days after the clinic has received the results. If you do not hear from us within 7 days, please contact the clinic through MyChart or phone. If you have a critical or abnormal lab result, we will  "notify you by phone as soon as possible.  Submit refill requests through [facility] or call your pharmacy and they will forward the refill request to us. Please allow 3 business days for your refill to be completed.          Additional Information About Your Visit        [facility] Information     [facility] gives you secure access to your electronic health record. If you see a primary care provider, you can also send messages to your care team and make appointments. If you have questions, please call your primary care clinic.  If you do not have a primary care provider, please call [phone] and they will assist you.        Care EveryWhere ID     This is your Care EveryWhere ID. This could be used by other organizations to access your [facility] medical records  [id number]        Your Vitals Were     Pulse Temperature Respirations Height Pulse Oximetry Breastfeeding?    104 98.4  F (36.9  C) (Oral) 12 5' 2.5\" (1.588 m) 99% No    BMI (Body Mass Index)                   27.74 kg/m2            Blood Pressure from Last 3 Encounters:   08/15/18 124/82   07/23/18 122/84   07/17/18 108/70    Weight from Last 3 Encounters:   08/15/18 154 lb 1.6 oz (69.9 kg)   07/23/18 147 lb 9.6 oz (67 kg)   07/17/18 151 lb (68.5 kg)              Today, you had the following     No orders found for display         Today's Medication Changes          These changes are accurate as of 8/15/18  3:26 PM.  If you have any questions, ask your nurse or doctor.               Start taking these medicines.        Dose/Directions    predniSONE 20 MG tablet   Commonly known as:  DELTASONE   Used for:  Urticaria   Started by:  Sonja Abreu APRN CNP        Dose:  20 mg   Take 1 tablet (20 mg) by mouth 2 times daily   Quantity:  10 tablet   Refills:  0         These medicines have changed or have updated prescriptions.        Dose/Directions    * hydrOXYzine 25 MG tablet   Commonly known as:  ATARAX   This may have changed:  Another medication with the " same name was added. Make sure you understand how and when to take each.   Used for:  TAHIR (generalized anxiety disorder)   Changed by:  Sonja Abreu APRN CNP        Dose:  25-50 mg   Take 1-2 tablets (25-50 mg) by mouth every 6 hours as needed for anxiety   Quantity:  90 tablet   Refills:  2       * hydrOXYzine 25 MG tablet   Commonly known as:  ATARAX   This may have changed:  You were already taking a medication with the same name, and this prescription was added. Make sure you understand how and when to take each.   Used for:  Urticaria   Changed by:  Sonja Abreu APRN CNP        Dose:  25-50 mg   Take 1-2 tablets (25-50 mg) by mouth every 6 hours as needed for itching   Quantity:  60 tablet   Refills:  1       triamcinolone 0.1 % cream   Commonly known as:  KENALOG   This may have changed:  additional instructions   Used for:  Behcet's syndrome (H)   Changed by:  Sonja Abreu APRN CNP        Apply sparingly to lesions twice daily as needed   Quantity:  80 g   Refills:  1       * Notice:  This list has 2 medication(s) that are the same as other medications prescribed for you. Read the directions carefully, and ask your doctor or other care provider to review them with you.         Where to get your medicines      These medications were sent to [facility] - [address] [address]     Phone:  [phone]     hydrOXYzine 25 MG tablet    triamcinolone 0.1 % cream         Some of these will need a paper prescription and others can be bought over the counter.  Ask your nurse if you have questions.     Bring a paper prescription for each of these medications     predniSONE 20 MG tablet                Primary Care Provider Office Phone # Fax #    EVI Cardona [phone]       [address] [facility] [address]  [facility] [address]        Equal Access to Services     NABIL GALEANO AH: Brandon  malcolm Santiago, waluigida luqadaha, qaybta kaalmada yessica, mike verónicain hayaaemmy dumontdavid crane laRobertarlet jaqueline. So [facility] [phone].    ATENCIÓN: Si henry nuñez, tiene a livingston disposición servicios gratuitos de asistencia lingüística. Jesus al [phone].    We comply with applicable federal civil rights laws and [place] laws. We do not discriminate on the basis of race, color, national origin, age, disability, sex, sexual orientation, or gender identity.            Thank you!     Thank you for choosing [facility] [place] [place]  for your care. Our goal is always to provide you with excellent care. Hearing back from our patients is one way we can continue to improve our services. Please take a few minutes to complete the written survey that you may receive in the mail after your visit with us. Thank you!             Your Updated Medication List - Protect others around you: Learn how to safely use, store and throw away your medicines at www.disposemymeds.org.          This list is accurate as of 8/15/18  3:26 PM.  Always use your most recent med list.                   Brand Name Dispense Instructions for use Diagnosis    albuterol 108 (90 Base) MCG/ACT inhaler    PROAIR HFA/PROVENTIL HFA/VENTOLIN HFA    1 Inhaler    Inhale 2 puffs into the lungs every 6 hours as needed for shortness of breath / dyspnea or wheezing    Atypical chest pain       amitriptyline 50 MG tablet    ELAVIL    30 tablet    Take 1 tablet (50 mg) by mouth At Bedtime    Abdominal pain, generalized, Insomnia due to medical condition       * apremilast 30 MG tablet    OTEZLA    180 tablet    Take 1 tablet (30 mg) by mouth 2 times daily    Behcet's disease (H)       * Apremilast 10 & 20 & 30 MG Tbpk    OTEZLA    1 each    Take by mouth according to the instructions on the packet. Hold for signs of infection,any GI upset, weight loss or depression concerns, and seek medical attention.    Behcet's disease (H)       artificial tears Oint ophthalmic  ointment     1 Tube    0.5 inch strip each eye at night    Bilateral dry eyes       ASPIRIN CHILDRENS 81 MG chewable tablet   Generic drug:  aspirin      Take 81 mg by mouth as needed        aspirin-acetaminophen-caffeine 250-250-65 MG per tablet    EXCEDRIN MIGRAINE     Take 1 tablet by mouth every 6 hours as needed for headaches        benzocaine 20 % Gel    TOPICALE XTRA    30 g    Apply as needed locally to mouth or nasal ulcers for pain; 4 times daily as needed    Behcet's disease (H)       betamethasone valerate 0.1 % cream    VALISONE     Apply topically 2 times daily        * BOTOX IJ      Inject 165 Units as directed once Lot#: /C3 Exp: 10/2020        * BOTOX IJ      Inject 165 Units as directed once Lot # /C3  Exp:  10/2020        * botulinum toxin type A 100 units injection    BOTOX    200 Units    Inject 200 Units into the muscle every 3 months    Cervical dystonia       * BOTOX IJ      Inject 165 Units into the muscle once Lot /C3 Exp 06/2020        * BOTOX IJ      Inject 175 Units into the muscle once Lot # /C3 with Expiration Date:  11/2020        clobetasol 0.05 % cream    TEMOVATE    60 g    Apply topically 2 times daily    Folliculitis       colchicine 0.6 MG tablet    COLCYRS    120 tablet    Take 0.6 mg in AM and 0.6mg in PM every day    Behcet's disease (H)       cyproheptadine 4 MG tablet    PERIACTIN    30 tablet    Take 1 tablet (4 mg) by mouth At Bedtime For nightmares    Nightmares       diclofenac 1 % Gel topical gel    VOLTAREN    100 g    Apply 2-4 grams to affected area(s) up to 4 times per day as needed. This is an anti-inflammatory medication.    Polyarthralgia       dicyclomine 20 MG tablet    BENTYL    120 tablet    Take 1 tablet (20 mg) by mouth 4 times daily as needed    Abdominal cramping       diltiazem 2% in PLO cream (FV COMPOUNDED) 2% Gel     30 g    Apply small pea size amount three times daily to anus until pain is gone.    Anal fissure        diphenhydrAMINE 25 MG tablet    BENADRYL    30 tablet    Take 1 tablet (25 mg) by mouth every 8 hours as needed for allergies        EPINEPHrine 0.3 MG/0.3ML injection 2-pack    EPIPEN 2-EAMON    0.6 mL    Inject 0.3 mLs (0.3 mg) into the muscle as needed for anaphylaxis    Hx of bee sting allergy       guanFACINE 1 MG tablet    TENEX    180 tablet    Take 3 tablets (3 mg) by mouth twice daily in the morning and evening.    Tic       * hydrOXYzine 25 MG tablet    ATARAX    90 tablet    Take 1-2 tablets (25-50 mg) by mouth every 6 hours as needed for anxiety    TAHIR (generalized anxiety disorder)       * hydrOXYzine 25 MG tablet    ATARAX    60 tablet    Take 1-2 tablets (25-50 mg) by mouth every 6 hours as needed for itching    Urticaria       hyoscyamine 0.125 MG tablet    ANASPAZ/LEVSIN    40 tablet    Take 1-2 tablets (125-250 mcg) by mouth every 4 hours as needed for cramping    Abdominal pain, generalized       hypromellose 0.3 % Soln ophthalmic solution    GENTEAL     1 drop every hour as needed for dry eyes        LACTAID PO      Take by mouth daily        lactulose 20 GM/30ML Soln     300 mL    Take 30 mLs by mouth 3 times daily as needed    Constipation, unspecified constipation type       lidocaine 2 % topical gel    XYLOCAINE     Apply 1 Tube topically daily Reported on 4/21/2017        lidocaine visc 2% 2.5mL/5mL & maalox/mylanta w/ simeth 2.5mL/5mL & diphenhydrAMINE 5mg/5mL Susp suspension    [facility] [place]    1 Bottle    Swish and swallow 10 mLs in mouth every 6 hours as needed for mouth sores    Behcet's syndrome (H)       linaclotide 145 MCG capsule    LINZESS    90 capsule    Take 1 capsule (145 mcg) by mouth every morning (before breakfast)    Chronic idiopathic constipation       Magnesium Aspartate HCl 1230 MG Pack     30 each    Take 1 packet by mouth daily as needed    Irritable bowel syndrome with constipation       norgestimate-ethinyl estradiol 0.25-35 MG-MCG per tablet    SPRINTEC 28     84 tablet    Take 1 tablet by mouth daily    General counseling for prescription of oral contraceptives       omeprazole 40 MG capsule    priLOSEC    90 capsule    Take 1 capsule (40 mg) by mouth daily    Gastroesophageal reflux disease, esophagitis presence not specified       ondansetron 8 MG tablet    ZOFRAN    60 tablet    Take 1 tablet (8 mg) by mouth every 8 hours as needed for nausea    Nausea       phenazopyridine HCl tablet    AZO URINARY PAIN RELIEF    24 tablet    Take 2 tablets (190 mg) by mouth 3 times daily    Dysuria       predniSONE 20 MG tablet    DELTASONE    10 tablet    Take 1 tablet (20 mg) by mouth 2 times daily    Urticaria       sucralfate 1 GM/10ML suspension    CARAFATE    1200 mL    Take 10 mLs (1 g) by mouth 4 times daily    Bile reflux gastritis, Nausea       triamcinolone 0.1 % cream    KENALOG    80 g    Apply sparingly to lesions twice daily as needed    Behcet's syndrome (H)       * Notice:  This list has 9 medication(s) that are the same as other medications prescribed for you. Read the directions carefully, and ask your doctor or other care provider to review them with you.

## 2018-08-15 NOTE — LETTER
My Depression Action Plan  Name: Samara Oropeza   Date of Birth 1995  Date: 8/14/2018    My doctor: Sonja Abreu   My clinic: [address] [address] [phone]          GREEN    ZONE   Good Control    What it looks like:     Things are going generally well. You have normal up s and down s. You may even feel depressed from time to time, but bad moods usually last less than a day.   What you need to do:  1. Continue to care for yourself (see self care plan)  2. Check your depression survival kit and update it as needed  3. Follow your physician s recommendations including any medication.  4. Do not stop taking medication unless you consult with your physician first.           YELLOW         ZONE Getting Worse    What it looks like:     Depression is starting to interfere with your life.     It may be hard to get out of bed; you may be starting to isolate yourself from others.    Symptoms of depression are starting to last most all day and this has happened for several days.     You may have suicidal thoughts but they are not constant.   What you need to do:     1. Call your care team, your response to treatment will improve if you keep your care team informed of your progress. Yellow periods are signs an adjustment may need to be made.     2. Continue your self-care, even if you have to fake it!    3. Talk to someone in your support network    4. Open up your depression survival kit           RED    ZONE Medical Alert - Get Help    What it looks like:     Depression is seriously interfering with your life.     You may experience these or other symptoms: You can t get out of bed most days, can t work or engage in other necessary activities, you have trouble taking care of basic hygiene, or basic responsibilities, thoughts of suicide or death that will not go away, self-injurious behavior.     What you need to do:  1. Call  your care team and request a same-day appointment. If they are not available (weekends or after hours) call your local crisis line, emergency room or 911.            Depression Self Care Plan / Survival Kit    Self-Care for Depression  Here s the deal. Your body and mind are really not as separate as most people think.  What you do and think affects how you feel and how you feel influences what you do and think. This means if you do things that people who feel good do, it will help you feel better.  Sometimes this is all it takes.  There is also a place for medication and therapy depending on how severe your depression is, so be sure to consult with your medical provider and/ or Behavioral Health Consultant if your symptoms are worsening or not improving.     In order to better manage my stress, I will:    Exercise  Get some form of exercise, every day. This will help reduce pain and release endorphins, the  feel good  chemicals in your brain. This is almost as good as taking antidepressants!  This is not the same as joining a gym and then never going! (they count on that by the way ) It can be as simple as just going for a walk or doing some gardening, anything that will get you moving.      Hygiene   Maintain good hygiene (Get out of bed in the morning, Make your bed, Brush your teeth, Take a shower, and Get dressed like you were going to work, even if you are unemployed).  If your clothes don't fit try to get ones that do.    Diet  I will strive to eat foods that are good for me, drink plenty of water, and avoid excessive sugar, caffeine, alcohol, and other mood-altering substances.  Some foods that are helpful in depression are: complex carbohydrates, B vitamins, flaxseed, fish or fish oil, fresh fruits and vegetables.    Psychotherapy  I agree to participate in Individual Therapy (if recommended).    Medication  If prescribed medications, I agree to take them.  Missing doses can result in serious side effects.   I understand that drinking alcohol, or other illicit drug use, may cause potential side effects.  I will not stop my medication abruptly without first discussing it with my provider.    Staying Connected With Others  I will stay in touch with my friends, family members, and my primary care provider/team.    Use your imagination  Be creative.  We all have a creative side; it doesn t matter if it s oil painting, sand castles, or mud pies! This will also kick up the endorphins.    Witness Beauty  (AKA stop and smell the roses) Take a look outside, even in mid-winter. Notice colors, textures. Watch the squirrels and birds.     Service to others  Be of service to others.  There is always someone else in need.  By helping others we can  get out of ourselves  and remember the really important things.  This also provides opportunities for practicing all the other parts of the program.    Humor  Laugh and be silly!  Adjust your TV habits for less news and crime-drama and more comedy.    Control your stress  Try breathing deep, massage therapy, biofeedback, and meditation. Find time to relax each day.     My support system    Clinic Contact:  Phone number:    Contact 1:  Phone number:    Contact 2:  Phone number:    Jewish/:  Phone number:    Therapist:  Phone number:    [facility] crisis center:    Phone number:    Other community support:  Phone number:

## 2018-08-21 ENCOUNTER — OFFICE VISIT (OUTPATIENT)
Dept: PSYCHOLOGY | Facility: CLINIC | Age: 23
End: 2018-08-21
Payer: COMMERCIAL

## 2018-08-21 DIAGNOSIS — F33.1 MAJOR DEPRESSIVE DISORDER, RECURRENT EPISODE, MODERATE (H): Primary | ICD-10-CM

## 2018-08-21 DIAGNOSIS — F41.1 GAD (GENERALIZED ANXIETY DISORDER): ICD-10-CM

## 2018-08-21 PROCEDURE — 90791 PSYCH DIAGNOSTIC EVALUATION: CPT | Performed by: COUNSELOR

## 2018-08-21 NOTE — MR AVS SNAPSHOT
MRN:[id number]                      After Visit Summary   8/21/2018    Samraa Oropeza    MRN: [id number]           Visit Information        Provider Department      8/21/2018 2:30 PM Ernestina Patel [facility]      Your next 10 appointments already scheduled     Aug 27, 2018  3:30 PM CDT   Return Visit with Ernestina Patel [facility] ([facility])    [place] [id number] [place] [id number]   [phone]            Aug 29, 2018  2:20 PM CDT   (Arrive by 2:05 PM)   Return Botox with Alejandrina Hernandez MD   [facility] ([facility])    [address] [place] [id number] [phone]            Sep 11, 2018 11:00 AM CDT   Return Visit with Ernestina Patel [facility] ([facility])    [facility]  [address] [place] [phone]   [phone]            Sep 25, 2018 11:00 AM CDT   Return Visit with Ernestina Patel [facility] ([facility])    [facility]  [address] [address]   [phone]            Oct 16, 2018 10:30 AM CDT   Return Visit with Austen Marquez MD   [facility][1] ([facility][1])    [address] [place] [id number] [phone]            Nov 13, 2018  3:40 PM CST   (Arrive by 3:25 PM)   Return Botox with Alejandrina Hernandez MD   [facility] ([facility])    [address] [id number] [phone]              MyChart Information     [employer] gives you secure access to your electronic health record. If you see a primary care provider, you can also send messages to your care team and make  appointments. If you have questions, please call your primary care clinic.  If you do not have a primary care provider, please call [phone] and they will assist you.        Care EveryWhere ID     This is your Care EveryWhere ID. This could be used by other organizations to access your [facility] medical records  [id number]        Equal Access to Services     NABIL GALEANO : Brandon Santiago, channing hurtado, mike maloney. So [facility] [phone].    ATENCIÓN: Si habla español, tiene a [place] disposición servicios gratuitos de asistencia lingüística. Llame al [phone].    We comply with applicable federal civil rights laws and [place] laws. We do not discriminate on the basis of race, color, national origin, age, disability, sex, sexual orientation, or gender identity.

## 2018-08-21 NOTE — Clinical Note
Samara Meza completed intake appt for therapy. We will be working on depression and anxiety. Please contact me with any questions or concerns.   Thank you, Ernestina Patel MA, [facility]
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
## 2018-08-27 ENCOUNTER — OFFICE VISIT (OUTPATIENT)
Dept: PSYCHOLOGY | Facility: CLINIC | Age: 23
End: 2018-08-27
Payer: COMMERCIAL

## 2018-08-27 DIAGNOSIS — F33.1 MAJOR DEPRESSIVE DISORDER, RECURRENT EPISODE, MODERATE (H): Primary | ICD-10-CM

## 2018-08-27 DIAGNOSIS — F41.1 GAD (GENERALIZED ANXIETY DISORDER): ICD-10-CM

## 2018-08-27 PROCEDURE — 90834 PSYTX W PT 45 MINUTES: CPT | Performed by: COUNSELOR

## 2018-08-27 ASSESSMENT — ANXIETY QUESTIONNAIRES
2. NOT BEING ABLE TO STOP OR CONTROL WORRYING: SEVERAL DAYS
1. FEELING NERVOUS, ANXIOUS, OR ON EDGE: MORE THAN HALF THE DAYS
5. BEING SO RESTLESS THAT IT IS HARD TO SIT STILL: MORE THAN HALF THE DAYS
6. BECOMING EASILY ANNOYED OR IRRITABLE: SEVERAL DAYS
IF YOU CHECKED OFF ANY PROBLEMS ON THIS QUESTIONNAIRE, HOW DIFFICULT HAVE THESE PROBLEMS MADE IT FOR YOU TO DO YOUR WORK, TAKE CARE OF THINGS AT HOME, OR GET ALONG WITH OTHER PEOPLE: SOMEWHAT DIFFICULT
3. WORRYING TOO MUCH ABOUT DIFFERENT THINGS: SEVERAL DAYS
7. FEELING AFRAID AS IF SOMETHING AWFUL MIGHT HAPPEN: SEVERAL DAYS
GAD7 TOTAL SCORE: 11

## 2018-08-27 ASSESSMENT — PATIENT HEALTH QUESTIONNAIRE - PHQ9: 5. POOR APPETITE OR OVEREATING: NEARLY EVERY DAY
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
## 2018-08-27 NOTE — MR AVS SNAPSHOT
MRN:[id number]                      After Visit Summary   8/27/2018    Samara Oropeza    MRN: [id number]           Visit Information        Provider Department      8/27/2018 3:30 PM Ernestina Patel [facility] Generic      Your next 10 appointments already scheduled     Aug 29, 2018  2:20 PM CDT   (Arrive by 2:05 PM)   Return Botox with Alejandrina Hernandez MD   [facility] ([facility])    [address] [place] [id number] [phone]            Sep 11, 2018 11:00 AM CDT   Return Visit with Ernestina Patel [facility] ([facility])    [address] [id number] [phone]            Sep 25, 2018 11:00 AM CDT   Return Visit with Ernestina Patel [facility] ([facility])    [address] [phone]   [phone]            Oct 16, 2018 10:30 AM CDT   Return Visit with Austen Marquez MD   [facility]    [address] [place] [phone]            Nov 13, 2018  3:40 PM CST   (Arrive by 3:25 PM)   Return Botox with Alejandrina Hernandez MD   [facility] ([facility])    [address] [place] [id number] [phone]              MyChart Information     [facility] gives you secure access to your electronic health record. If you see a primary care provider, you can also send messages to your care team and make appointments. If you have questions, please call your primary care clinic.  If you do not have a primary care provider, please call [phone] and they will assist you.        Care EveryWhere ID     This is your Care  EveryWhere ID. This could be used by other organizations to access your [place] medical records  [id number]        Equal Access to Services     NABIL GALEANO : Brandon Santiago, channing hurtado, clover juan, mike parsons. So [facility] [phone].    ATENCIÓN: Si habla español, tiene a [place] disposición servicios gratuitos de asistencia lingüística. Llame al [phone].    We comply with applicable federal civil rights laws and [place] laws. We do not discriminate on the basis of race, color, national origin, age, disability, sex, sexual orientation, or gender identity.

## 2018-08-28 ASSESSMENT — PATIENT HEALTH QUESTIONNAIRE - PHQ9: SUM OF ALL RESPONSES TO PHQ QUESTIONS 1-9: 12

## 2018-08-28 ASSESSMENT — ANXIETY QUESTIONNAIRES: GAD7 TOTAL SCORE: 11

## 2018-08-29 ENCOUNTER — OFFICE VISIT (OUTPATIENT)
Dept: PHYSICAL MEDICINE AND REHAB | Facility: CLINIC | Age: 23
End: 2018-08-29
Payer: COMMERCIAL

## 2018-08-29 VITALS
WEIGHT: 154.4 LBS | HEART RATE: 111 BPM | HEIGHT: 63 IN | TEMPERATURE: 97.9 F | DIASTOLIC BLOOD PRESSURE: 82 MMHG | OXYGEN SATURATION: 100 % | RESPIRATION RATE: 20 BRPM | BODY MASS INDEX: 27.36 KG/M2 | SYSTOLIC BLOOD PRESSURE: 120 MMHG

## 2018-08-29 DIAGNOSIS — G43.719 INTRACTABLE CHRONIC MIGRAINE WITHOUT AURA AND WITHOUT STATUS MIGRAINOSUS: Primary | ICD-10-CM

## 2018-08-29 ASSESSMENT — PAIN SCALES - GENERAL: PAINLEVEL: SEVERE PAIN (6)

## 2018-08-29 NOTE — NURSING NOTE
Chief Complaint   Patient presents with     RECHECK     [facility] RETURN - BOTOX     Crystal George MA

## 2018-08-29 NOTE — PROGRESS NOTES
"BOTULINUM TOXIN PROCEDURE - HEADACHE - NOTE    Chief Complaint   Patient presents with     RECHECK     UMP RETURN - BOTOX       /82 (BP Location: Right arm, Patient Position: Sitting, Cuff Size: Adult Regular)  Pulse 111  Temp 97.9  F (36.6  C) (Oral)  Resp 20  Ht 1.588 m (5' 2.5\")  Wt 70 kg (154 lb 6.4 oz)  SpO2 100%  BMI 27.79 kg/m2       Current Outpatient Prescriptions:      albuterol (PROAIR HFA/PROVENTIL HFA/VENTOLIN HFA) 108 (90 BASE) MCG/ACT Inhaler, Inhale 2 puffs into the lungs every 6 hours as needed for shortness of breath / dyspnea or wheezing, Disp: 1 Inhaler, Rfl: 1     amitriptyline (ELAVIL) 50 MG tablet, Take 1 tablet (50 mg) by mouth At Bedtime, Disp: 30 tablet, Rfl: 11     Apremilast (OTEZLA) 10 & 20 & 30 MG TBPK, Take by mouth according to the instructions on the packet. Hold for signs of infection,any GI upset, weight loss or depression concerns, and seek medical attention., Disp: 1 each, Rfl: 0     apremilast (OTEZLA) 30 MG tablet, Take 1 tablet (30 mg) by mouth 2 times daily, Disp: 180 tablet, Rfl: 0     artificial tears OINT ophthalmic ointment, 0.5 inch strip each eye at night, Disp: 1 Tube, Rfl: 11     aspirin (ASPIRIN CHILDRENS) 81 MG chewable tablet, Take 81 mg by mouth as needed , Disp: , Rfl:      aspirin-acetaminophen-caffeine (EXCEDRIN MIGRAINE) 250-250-65 MG per tablet, Take 1 tablet by mouth every 6 hours as needed for headaches, Disp: , Rfl:      benzocaine (TOPICALE XTRA) 20 % GEL, Apply as needed locally to mouth or nasal ulcers for pain; 4 times daily as needed, Disp: 30 g, Rfl: 1     betamethasone valerate (VALISONE) 0.1 % cream, Apply topically 2 times daily, Disp: , Rfl:      botulinum toxin type A (BOTOX) 100 UNITS injection, Inject 200 Units into the muscle every 3 months, Disp: 200 Units, Rfl: 3     clobetasol (TEMOVATE) 0.05 % cream, Apply topically 2 times daily, Disp: 60 g, Rfl: 0     colchicine (COLCYRS) 0.6 MG tablet, Take 0.6 mg in AM and 0.6mg in PM " every day, Disp: 120 tablet, Rfl: 2     cyproheptadine (PERIACTIN) 4 MG tablet, Take 1 tablet (4 mg) by mouth At Bedtime For nightmares, Disp: 30 tablet, Rfl: 2     diclofenac (VOLTAREN) 1 % GEL topical gel, Apply 2-4 grams to affected area(s) up to 4 times per day as needed. This is an anti-inflammatory medication., Disp: 100 g, Rfl: 4     dicyclomine (BENTYL) 20 MG tablet, Take 1 tablet (20 mg) by mouth 4 times daily as needed, Disp: 120 tablet, Rfl: 3     diltiazem 2% in PLO cream, FV COMPOUNDED, 2% GEL, Apply small pea size amount three times daily to anus until pain is gone., Disp: 30 g, Rfl: 1     diphenhydrAMINE (BENADRYL) 25 MG tablet, Take 1 tablet (25 mg) by mouth every 8 hours as needed for allergies, Disp: 30 tablet, Rfl: 0     EPINEPHrine (EPIPEN 2-EAMON) 0.3 MG/0.3ML injection, Inject 0.3 mLs (0.3 mg) into the muscle as needed for anaphylaxis, Disp: 0.6 mL, Rfl: 3     guanFACINE (TENEX) 1 MG tablet, Take 3 tablets (3 mg) by mouth twice daily in the morning and evening., Disp: 180 tablet, Rfl: 1     hydrOXYzine (ATARAX) 25 MG tablet, Take 1-2 tablets (25-50 mg) by mouth every 6 hours as needed for itching, Disp: 60 tablet, Rfl: 1     hydrOXYzine (ATARAX) 25 MG tablet, Take 1-2 tablets (25-50 mg) by mouth every 6 hours as needed for anxiety, Disp: 90 tablet, Rfl: 2     hyoscyamine (ANASPAZ/LEVSIN) 0.125 MG tablet, Take 1-2 tablets (125-250 mcg) by mouth every 4 hours as needed for cramping, Disp: 40 tablet, Rfl: 1     hypromellose (GENTEAL) 0.3 % SOLN, 1 drop every hour as needed for dry eyes , Disp: , Rfl:      Lactase (LACTAID PO), Take by mouth daily, Disp: , Rfl:      lactulose 20 GM/30ML SOLN, Take 30 mLs by mouth 3 times daily as needed, Disp: 300 mL, Rfl: 3     lidocaine (XYLOCAINE) 2 % jelly, Apply 1 Tube topically daily Reported on 4/21/2017, Disp: , Rfl:      linaclotide (LINZESS) 145 MCG capsule, Take 1 capsule (145 mcg) by mouth every morning (before breakfast), Disp: 90 capsule, Rfl: 1      Magic Mouthwash (FV std formula) lidocaine visc 2% 2.5mL/5mL & maalox/mylanta w/ simeth 2.5mL/5mL & diphenhydrAMINE 5mg/5mL, Swish and swallow 10 mLs in mouth every 6 hours as needed for mouth sores, Disp: 1 Bottle, Rfl: 1     Magnesium Aspartate HCl 1230 MG PACK, Take 1 packet by mouth daily as needed, Disp: 30 each, Rfl: 3     norgestimate-ethinyl estradiol (SPRINTEC 28) 0.25-35 MG-MCG per tablet, Take 1 tablet by mouth daily, Disp: 84 tablet, Rfl: 4     omeprazole (PRILOSEC) 40 MG capsule, Take 1 capsule (40 mg) by mouth daily, Disp: 90 capsule, Rfl: 3     OnabotulinumtoxinA (BOTOX IJ), Inject 175 Units into the muscle Lot: [id number] Exp: 01/2021, Disp: , Rfl:      OnabotulinumtoxinA (BOTOX IJ), Inject 175 Units into the muscle once Lot # /C3 with Expiration Date:  11/2020, Disp: , Rfl:      OnabotulinumtoxinA (BOTOX IJ), Inject 165 Units as directed once Lot#: /C3 Exp: 10/2020, Disp: , Rfl:      OnabotulinumtoxinA (BOTOX IJ), Inject 165 Units as directed once Lot # /C3  Exp:  10/2020, Disp: , Rfl:      OnabotulinumtoxinA (BOTOX IJ), Inject 165 Units into the muscle once Lot /C3 Exp 06/2020, Disp: , Rfl:      ondansetron (ZOFRAN) 8 MG tablet, Take 1 tablet (8 mg) by mouth every 8 hours as needed for nausea, Disp: 60 tablet, Rfl: 1     phenazopyridine HCl (AZO URINARY PAIN RELIEF) tablet, Take 2 tablets (190 mg) by mouth 3 times daily, Disp: 24 tablet, Rfl: 1     predniSONE (DELTASONE) 20 MG tablet, Take 1 tablet (20 mg) by mouth 2 times daily, Disp: 10 tablet, Rfl: 0     sucralfate (CARAFATE) 1 GM/10ML suspension, Take 10 mLs (1 g) by mouth 4 times daily, Disp: 1200 mL, Rfl: 2     triamcinolone (KENALOG) 0.1 % cream, Apply sparingly to lesions twice daily as needed, Disp: 80 g, Rfl: 1     Allergies   Allergen Reactions     Amoxil [Penicillins] Rash     Dad unsure of reaction.     Bee Venom Anaphylaxis     Contrast Dye Rash     Contrast Media Ready-Box [facility], 04/09/2014.; Contrast Media Ready-Box  [facility], 04/09/2014.  NOTE: this is a contrast media oral with iodine. Premedicate with methylpred standard for IV contrast, request barium contrast for oral contrast.     Kiwi Swelling     Orange Fruit [Citrus] Anaphylaxis     Pineapple Anaphylaxis, Difficulty breathing and Rash     Reglan [Metoclopramide] Other (See Comments)     IV dose only, in ER, rapid heart rate.     Ace Inhibitors      Difficulty in breathing and GI upset     Amitiza [Lubiprostone] Nausea and Vomiting     Amoxicillin-Pot Clavulanate      Latex      Midazolam Unknown     Other reaction(s): Unknown  parent states that when pt takes this medication, she wakes up being very violent .  parent states that when pt takes this medication, she wakes up being very violent .     No Clinical Screening - See Comments      Bleech/ chest tightness, itchy throat, swollen tongue, hives     Versed      Coming out of pelvic exam at age of 5, was kicking and screaming when coming out of the versed.     Adhesive Tape Rash     Azithromycin Hives and Rash     Cephalexin Itching and Rash     Itchy mouth  Itchy mouth     Keflex [Cephalexin-Fd&C Yellow #6] Hives        PHYSICAL EXAM:    Currently has a 5-6/10 headache  No facial asymmetry    HPI:    Patient denies new medical diagnoses, illnesses, hospitalizations, emergency room visits, and injuries since the previous injection with botulinum neurotoxin.    We reviewed the recommended safety guidelines for  Botox from any vaccine injection, such as the seasonal flu vaccine, by a minimum of 10-14 days with Samara Oropeza. She acknowledged understanding.    RESPONSE TO PREVIOUS TREATMENT:  Change in headache pattern following last series of injections with 170 Units on 5/29/2018.  She had previously had 165 units of  Botox on 02/28/2018.    Describes having lightheadedness 20 min after injection, feeling feverish for 2 days, decreased appetite for a few days, post-procedural migraine for 5 days, and pain at  injection sites for 1 week.      1.  Headache frequency during this injection cycle: 3 headache days per month.  This is compared to her baseline headache frequency of 20 headache days per month. No HA from 1 week after procedure until 2 weeks ago (10 week period)    2.  Headache duration during this injection cycle:  Headache duration ranged from 3 hours to 1-2 days. Patient reports 2 episodes of multiple day headaches during this injection cycle.     3.  Headache intensity during this injection cycle:    A.  4/10  =  Typical pain level.  B.  8/10  =  Worst pain level.  C.  0/10  =  Lowest pain level.    4.  Change in headache medication usage during this injection cycle:  Has not taken any other medication for her headaches.    5.  ER Visits During This Injection Cycle:  None.    6.  Functional Performance:  Change in ADL's, social interaction, days lost from work, etc. Patient reports 5 days lost from work due to headache during this injection cycle after the procedure which is typical for her.      BOTULINUM NEUROTOXIN INJECTION PROCEDURES:      VERIFICATION OF PATIENT IDENTIFICATION AND PROCEDURE     Initials   Patient Name map   Patient  map   Procedure Verified by: map     Prior to the start of the procedure and with procedural staff participation, I verbally confirmed the patient s identity using two indicators, relevant allergies, that the procedure was appropriate and matched the consent or emergent situation, and that the correct equipment/implants were available. Immediately prior to starting the procedure I conducted the Time Out with the procedural staff and re-confirmed the patient s name, procedure, and site/side. (The Joint Commission universal protocol was followed.)  Yes    Sedation (Moderate or Deep): None    Above assessments performed by:  Best Jensen DO  PGY-2, PM&R Resident    Alejandrina Hernandez MD      INDICATIONS FOR PROCEDURES:  Samara Oropeza is a 22-year-old patient with  chronic migraine headaches associated with cervicogenic  components.     Her baseline symptoms have been recalcitrant to oral medications and conservative therapy.  She is here today for reinjection with Botox.    GOAL OF PROCEDURE:  The goal of this procedure is to increase active range of motion, improve volitional motor control, decrease pain  and enhance functional independence.    TOTAL DOSE ADMINISTERED:  Dose Administered:  175 units  Botox (Botulinum Toxin Type A)       2:1 Dilution   Diluent Used:  0.25% Sensorcaine Lot [id number] Exp 06/22  Lot: [id number]  with Expiration Date: 01/2021  NDC #: Botox 100u ([address]-70)    Medication guide was offered to patient and was declined.    CONSENT:  The risks, benefits, and treatment options were discussed with Samara Oropeza and she agreed to proceed.    Written consent was obtained by [facility].     EQUIPMENT USED:  Needle-37mm stimulating/recording  Needle-30 gauge  EMG/NCS Machine    SKIN PREPARATION:  Skin preparation was performed using an alcohol wipe.    GUIDANCE DESCRIPTION:  Electro-myographic guidance was necessary throughout the procedure to accurately identify all areas of spastic muscles while avoiding injection of non-spastic muscles, neighboring nerves and nearby vascular structures.     AREA/MUSCLE INJECTED:  175 UNITS BOTOX = TOTAL DOSE, DILUTION 2:1 NS and 0.25% Sensorcaine     1 & 2. SHOULDER GIRDLE & NECK MUSCLES: 20 units Botox = Total Dose, 2:1 Dilution   Right Splenius - 5 units of Botox at 1 site/s.   Left Splenius - 5 units of Botox at 1 site/s.      Right Levator Scapulae - 5 units of Botox at 1 site/s (shoulder muscles).   Left Levator Scapulae - 5 units of Botox at 1 site/s (shoulder muscles).     3. HEAD & SCALP MUSCLES: 155 units Botox = Total Dose, 2:1 Dilution  Right Occipitalis - 10 units of Botox at 3 site/s.   Left Occipitalis - 10 units of Botox at 3 site/s.     Right Frontalis - 15 units of Botox at 4 site/s.  Left Frontalis - 15  units of Botox at 4 site/s.     Right Temporalis - 45 units of Botox at 8 site/s.  Left Temporalis - 45 units of Botox at 8 site/s.     Right  - 5 units of Botox at 1 site/s.              Left  - 5 units of Botox at 1 site/s.      Procerus - 5 units of Botox at 1 site/s.      RESPONSE TO PROCEDURE:  Samara Oropeza tolerated the procedure well and there were no immediate complications.   She was allowed to recover for an appropriate period of time and was discharged home in stable condition.    FOLLOW UP:  Samara Oropeza was asked to follow up by phone in 7-14 days with Marcelle Richardson PT, Care Coordinator or Vidya Dickinson RN, Care Coordinator, to report her response to this series of injections.  Based on the patient's previous response to this therapy, Samara Oropeza was rescheduled for the next series of injections in 12 weeks.    PLAN (Medication Changes, Therapy Orders, Work or Disability Issues, etc.): Patient will continue to monitor response to today's injections.    There were 25 units of Botox as unavoidable waste for this patient.

## 2018-08-29 NOTE — LETTER
"8/29/2018       RE: Samara Oropeza  [address]  [address]  [place] [address]     Dear Colleague,    Thank you for referring your patient, Samara Oropeza, to the [facility] PHYSICAL MEDICINE AND REHABILITATION at [facility]. Please see a copy of my visit note below.    BOTULINUM TOXIN PROCEDURE - HEADACHE - NOTE    Chief Complaint   Patient presents with     RECHECK     UMP RETURN - BOTOX       /82 (BP Location: Right arm, Patient Position: Sitting, Cuff Size: Adult Regular)  Pulse 111  Temp 97.9  F (36.6  C) (Oral)  Resp 20  Ht 1.588 m (5' 2.5\")  Wt 70 kg (154 lb 6.4 oz)  SpO2 100%  BMI 27.79 kg/m2       Current Outpatient Prescriptions:      albuterol (PROAIR HFA/PROVENTIL HFA/VENTOLIN HFA) 108 (90 BASE) MCG/ACT Inhaler, Inhale 2 puffs into the lungs every 6 hours as needed for shortness of breath / dyspnea or wheezing, Disp: 1 Inhaler, Rfl: 1     amitriptyline (ELAVIL) 50 MG tablet, Take 1 tablet (50 mg) by mouth At Bedtime, Disp: 30 tablet, Rfl: 11     Apremilast (OTEZLA) 10 & 20 & 30 MG TBPK, Take by mouth according to the instructions on the packet. Hold for signs of infection,any GI upset, weight loss or depression concerns, and seek medical attention., Disp: 1 each, Rfl: 0     apremilast (OTEZLA) 30 MG tablet, Take 1 tablet (30 mg) by mouth 2 times daily, Disp: 180 tablet, Rfl: 0     artificial tears OINT ophthalmic ointment, 0.5 inch strip each eye at night, Disp: 1 Tube, Rfl: 11     aspirin (ASPIRIN CHILDRENS) 81 MG chewable tablet, Take 81 mg by mouth as needed , Disp: , Rfl:      aspirin-acetaminophen-caffeine (EXCEDRIN MIGRAINE) 250-250-65 MG per tablet, Take 1 tablet by mouth every 6 hours as needed for headaches, Disp: , Rfl:      benzocaine (TOPICALE XTRA) 20 % GEL, Apply as needed locally to mouth or nasal ulcers for pain; 4 times daily as needed, Disp: 30 g, Rfl: 1     betamethasone valerate (VALISONE) 0.1 % cream, Apply topically 2 " times daily, Disp: , Rfl:      botulinum toxin type A (BOTOX) 100 UNITS injection, Inject 200 Units into the muscle every 3 months, Disp: 200 Units, Rfl: 3     clobetasol (TEMOVATE) 0.05 % cream, Apply topically 2 times daily, Disp: 60 g, Rfl: 0     colchicine (COLCYRS) 0.6 MG tablet, Take 0.6 mg in AM and 0.6mg in PM every day, Disp: 120 tablet, Rfl: 2     cyproheptadine (PERIACTIN) 4 MG tablet, Take 1 tablet (4 mg) by mouth At Bedtime For nightmares, Disp: 30 tablet, Rfl: 2     diclofenac (VOLTAREN) 1 % GEL topical gel, Apply 2-4 grams to affected area(s) up to 4 times per day as needed. This is an anti-inflammatory medication., Disp: 100 g, Rfl: 4     dicyclomine (BENTYL) 20 MG tablet, Take 1 tablet (20 mg) by mouth 4 times daily as needed, Disp: 120 tablet, Rfl: 3     diltiazem 2% in PLO cream, FV COMPOUNDED, 2% GEL, Apply small pea size amount three times daily to anus until pain is gone., Disp: 30 g, Rfl: 1     diphenhydrAMINE (BENADRYL) 25 MG tablet, Take 1 tablet (25 mg) by mouth every 8 hours as needed for allergies, Disp: 30 tablet, Rfl: 0     EPINEPHrine (EPIPEN 2-EAMON) 0.3 MG/0.3ML injection, Inject 0.3 mLs (0.3 mg) into the muscle as needed for anaphylaxis, Disp: 0.6 mL, Rfl: 3     guanFACINE (TENEX) 1 MG tablet, Take 3 tablets (3 mg) by mouth twice daily in the morning and evening., Disp: 180 tablet, Rfl: 1     hydrOXYzine (ATARAX) 25 MG tablet, Take 1-2 tablets (25-50 mg) by mouth every 6 hours as needed for itching, Disp: 60 tablet, Rfl: 1     hydrOXYzine (ATARAX) 25 MG tablet, Take 1-2 tablets (25-50 mg) by mouth every 6 hours as needed for anxiety, Disp: 90 tablet, Rfl: 2     hyoscyamine (ANASPAZ/LEVSIN) 0.125 MG tablet, Take 1-2 tablets (125-250 mcg) by mouth every 4 hours as needed for cramping, Disp: 40 tablet, Rfl: 1     hypromellose (GENTEAL) 0.3 % SOLN, 1 drop every hour as needed for dry eyes , Disp: , Rfl:      Lactase (LACTAID PO), Take by mouth daily, Disp: , Rfl:      lactulose 20 GM/30ML  SOLN, Take 30 mLs by mouth 3 times daily as needed, Disp: 300 mL, Rfl: 3     lidocaine (XYLOCAINE) 2 % jelly, Apply 1 Tube topically daily Reported on 4/21/2017, Disp: , Rfl:      linaclotide (LINZESS) 145 MCG capsule, Take 1 capsule (145 mcg) by mouth every morning (before breakfast), Disp: 90 capsule, Rfl: 1     Magic Mouthwash (FV std formula) lidocaine visc 2% 2.5mL/5mL & maalox/mylanta w/ simeth 2.5mL/5mL & diphenhydrAMINE 5mg/5mL, Swish and swallow 10 mLs in mouth every 6 hours as needed for mouth sores, Disp: 1 Bottle, Rfl: 1     Magnesium Aspartate HCl 1230 MG PACK, Take 1 packet by mouth daily as needed, Disp: 30 each, Rfl: 3     norgestimate-ethinyl estradiol (SPRINTEC 28) 0.25-35 MG-MCG per tablet, Take 1 tablet by mouth daily, Disp: 84 tablet, Rfl: 4     omeprazole (PRILOSEC) 40 MG capsule, Take 1 capsule (40 mg) by mouth daily, Disp: 90 capsule, Rfl: 3     OnabotulinumtoxinA (BOTOX IJ), Inject 175 Units into the muscle Lot: [id number] Exp: 01/2021, Disp: , Rfl:      OnabotulinumtoxinA (BOTOX IJ), Inject 175 Units into the muscle once Lot # /C3 with Expiration Date:  11/2020, Disp: , Rfl:      OnabotulinumtoxinA (BOTOX IJ), Inject 165 Units as directed once Lot#: /C3 Exp: 10/2020, Disp: , Rfl:      OnabotulinumtoxinA (BOTOX IJ), Inject 165 Units as directed once Lot # /C3  Exp:  10/2020, Disp: , Rfl:      OnabotulinumtoxinA (BOTOX IJ), Inject 165 Units into the muscle once Lot /C3 Exp 06/2020, Disp: , Rfl:      ondansetron (ZOFRAN) 8 MG tablet, Take 1 tablet (8 mg) by mouth every 8 hours as needed for nausea, Disp: 60 tablet, Rfl: 1     phenazopyridine HCl (AZO URINARY PAIN RELIEF) tablet, Take 2 tablets (190 mg) by mouth 3 times daily, Disp: 24 tablet, Rfl: 1     predniSONE (DELTASONE) 20 MG tablet, Take 1 tablet (20 mg) by mouth 2 times daily, Disp: 10 tablet, Rfl: 0     sucralfate (CARAFATE) 1 GM/10ML suspension, Take 10 mLs (1 g) by mouth 4 times daily, Disp: 1200 mL, Rfl: 2      triamcinolone (KENALOG) 0.1 % cream, Apply sparingly to lesions twice daily as needed, Disp: 80 g, Rfl: 1     Allergies   Allergen Reactions     Amoxil [Penicillins] Rash     Dad unsure of reaction.     Bee Venom Anaphylaxis     Contrast Dye Rash     Contrast [facility], 04/09/2014.; Contrast [facility], 04/09/2014.  NOTE: this is a contrast media oral with iodine. Premedicate with methylpred standard for IV contrast, request barium contrast for oral contrast.     Kiwi Swelling     Orange Fruit [Citrus] Anaphylaxis     Pineapple Anaphylaxis, Difficulty breathing and Rash     Reglan [Metoclopramide] Other (See Comments)     IV dose only, in ER, rapid heart rate.     Ace Inhibitors      Difficulty in breathing and GI upset     Amitiza [Lubiprostone] Nausea and Vomiting     Amoxicillin-Pot Clavulanate      Latex      Midazolam Unknown     Other reaction(s): Unknown  parent states that when pt takes this medication, she wakes up being very violent .  parent states that when pt takes this medication, she wakes up being very violent .     No Clinical Screening - See Comments      Bleech/ chest tightness, itchy throat, swollen tongue, hives     Versed      Coming out of pelvic exam at age of 5, was kicking and screaming when coming out of the versed.     Adhesive Tape Rash     Azithromycin Hives and Rash     Cephalexin Itching and Rash     Itchy mouth  Itchy mouth     Keflex [Cephalexin-Fd&C Yellow #6] Hives        PHYSICAL EXAM:    Currently has a 5-6/10 headache  No facial asymmetry    HPI:    Patient denies new medical diagnoses, illnesses, hospitalizations, emergency room visits, and injuries since the previous injection with botulinum neurotoxin.    We reviewed the recommended safety guidelines for  Botox from any vaccine injection, such as the seasonal flu vaccine, by a minimum of 10-14 days with Samara Oropeza. She acknowledged understanding.    RESPONSE TO PREVIOUS  TREATMENT:  Change in headache pattern following last series of injections with 170 Units on 2018.  She had previously had 165 units of  Botox on 2018.    Describes having lightheadedness 20 min after injection, feeling feverish for 2 days, decreased appetite for a few days, post-procedural migraine for 5 days, and pain at injection sites for 1 week.      1.  Headache frequency during this injection cycle: 3 headache days per month.  This is compared to her baseline headache frequency of 20 headache days per month. No HA from 1 week after procedure until 2 weeks ago (10 week period)    2.  Headache duration during this injection cycle:  Headache duration ranged from 3 hours to 1-2 days. Patient reports 2 episodes of multiple day headaches during this injection cycle.     3.  Headache intensity during this injection cycle:    A.  4/10  =  Typical pain level.  B.  8/10  =  Worst pain level.  C.  0/10  =  Lowest pain level.    4.  Change in headache medication usage during this injection cycle:  Has not taken any other medication for her headaches.    5.  ER Visits During This Injection Cycle:  None.    6.  Functional Performance:  Change in ADL's, social interaction, days lost from work, etc. Patient reports 5 days lost from work due to headache during this injection cycle after the procedure which is typical for her.      BOTULINUM NEUROTOXIN INJECTION PROCEDURES:      VERIFICATION OF PATIENT IDENTIFICATION AND PROCEDURE     Initials   Patient Name map   Patient  map   Procedure Verified by: map     Prior to the start of the procedure and with procedural staff participation, I verbally confirmed the patient s identity using two indicators, relevant allergies, that the procedure was appropriate and matched the consent or emergent situation, and that the correct equipment/implants were available. Immediately prior to starting the procedure I conducted the Time Out with the procedural staff and  re-confirmed the patient s name, procedure, and site/side. (The Joint Commission universal protocol was followed.)  Yes    Sedation (Moderate or Deep): None    Above assessments performed by:  Best Jensen DO  PGY-2, PM&R Resident    Alejandrina Hernandez MD      INDICATIONS FOR PROCEDURES:  Samara Oropeza is a 22-year-old patient with chronic migraine headaches associated with cervicogenic  components.     Her baseline symptoms have been recalcitrant to oral medications and conservative therapy.  She is here today for reinjection with Botox.    GOAL OF PROCEDURE:  The goal of this procedure is to increase active range of motion, improve volitional motor control, decrease pain  and enhance functional independence.    TOTAL DOSE ADMINISTERED:  Dose Administered:  175 units  Botox (Botulinum Toxin Type A)       2:1 Dilution   Diluent Used:  0.25% Sensorcaine Lot [id number] Exp 06/22  Lot: [id number]  with Expiration Date: 01/2021  NDC #: Botox 100u ([address]-98)    Medication guide was offered to patient and was declined.    CONSENT:  The risks, benefits, and treatment options were discussed with Samara Oropeza and she agreed to proceed.    Written consent was obtained by [facility].     EQUIPMENT USED:  Needle-37mm stimulating/recording  Needle-30 gauge  EMG/NCS Machine    SKIN PREPARATION:  Skin preparation was performed using an alcohol wipe.    GUIDANCE DESCRIPTION:  Electro-myographic guidance was necessary throughout the procedure to accurately identify all areas of spastic muscles while avoiding injection of non-spastic muscles, neighboring nerves and nearby vascular structures.     AREA/MUSCLE INJECTED:  175 UNITS BOTOX = TOTAL DOSE, DILUTION 2:1 NS and 0.25% Sensorcaine     1 & 2. SHOULDER GIRDLE & NECK MUSCLES: 20 units Botox = Total Dose, 2:1 Dilution   Right Splenius - 5 units of Botox at 1 site/s.   Left Splenius - 5 units of Botox at 1 site/s.      Right Levator Scapulae - 5 units of Botox at 1 site/s  (shoulder muscles).   Left Levator Scapulae - 5 units of Botox at 1 site/s (shoulder muscles).     3. HEAD & SCALP MUSCLES: 155 units Botox = Total Dose, 2:1 Dilution  Right Occipitalis - 10 units of Botox at 3 site/s.   Left Occipitalis - 10 units of Botox at 3 site/s.     Right Frontalis - 15 units of Botox at 4 site/s.  Left Frontalis - 15 units of Botox at 4 site/s.     Right Temporalis - 45 units of Botox at 8 site/s.  Left Temporalis - 45 units of Botox at 8 site/s.     Right  - 5 units of Botox at 1 site/s.              Left  - 5 units of Botox at 1 site/s.      Procerus - 5 units of Botox at 1 site/s.      RESPONSE TO PROCEDURE:  Samara Oropeza tolerated the procedure well and there were no immediate complications.   She was allowed to recover for an appropriate period of time and was discharged home in stable condition.    FOLLOW UP:  Samara Oropeza was asked to follow up by phone in 7-14 days with Marcelle Richardson PT, Care Coordinator or Vidya Dickinson RN, Care Coordinator, to report her response to this series of injections.  Based on the patient's previous response to this therapy, Samara Oropeza was rescheduled for the next series of injections in 12 weeks.    PLAN (Medication Changes, Therapy Orders, Work or Disability Issues, etc.): Patient will continue to monitor response to today's injections.    There were 25 units of Botox as unavoidable waste for this patient.    Again, thank you for allowing me to participate in the care of your patient.      Sincerely,    Alejandrina Hernandez MD

## 2018-08-29 NOTE — MR AVS SNAPSHOT
After Visit Summary   8/29/2018    Samara Oropeza    MRN: [id number]           Patient Information     Date Of Birth          1995        Visit Information        Provider Department      8/29/2018 2:20 PM Alejandrina Hernandez MD [facility] Physical Medicine and Rehabilitation        Today's Diagnoses     Intractable chronic migraine without aura and without status migrainosus    -  1       Follow-ups after your visit        Your next 10 appointments already scheduled     Sep 11, 2018 11:00 AM CDT   Return Visit with Ernestina Patel [address] [address]   [phone]            Sep 25, 2018 11:00 AM CDT   Return Visit with rEnestina Patel [address] [address]   [phone]            Oct 16, 2018 10:30 AM CDT   [facility] Visit with Austen Marquez MD   [facility]    [address] [place] [id number] [phone]            Nov 13, 2018  3:40 PM CST   (Arrive by 3:25 PM)   Return Botox with Alejandrina Hernandez MD   [facility] Physical Medicine and Rehabilitation ([facility])    [address] [address] [phone]              Who to contact     Please call your clinic at [phone] to:    Ask questions about your health    Make or cancel appointments    Discuss your medicines    Learn about your test results    Speak to your doctor            Additional Information About Your Visit        MyChart Information     [employer] gives you secure access to your electronic health record. If you see a primary care provider, you can also send messages to your care team and make appointments. If you have questions, please call  "your primary care clinic.  If you do not have a primary care provider, please call [phone] and they will assist you.      [email or web address] is an electronic gateway that provides easy, online access to your medical records. With [email or web address], you can request a clinic appointment, read your test results, renew a prescription or communicate with your care team.     To access your existing account, please contact your [facility] or call [phone] for assistance.        Care EveryWhere ID     This is your Care EveryWhere ID. This could be used by other organizations to access your [place] medical records  [id number]        Your Vitals Were     Pulse Temperature Respirations Height Pulse Oximetry BMI (Body Mass Index)    111 97.9  F (36.6  C) (Oral) 20 1.588 m (5' 2.5\") 100% 27.79 kg/m2       Blood Pressure from Last 3 Encounters:   08/29/18 120/82   08/15/18 124/82   07/23/18 122/84    Weight from Last 3 Encounters:   08/29/18 70 kg (154 lb 6.4 oz)   08/15/18 69.9 kg (154 lb 1.6 oz)   07/23/18 67 kg (147 lb 9.6 oz)              We Performed the Following     HC CHEMODENERVATE FACIAL,TRIGERM,NERV MIGRAINE     Needle EMG Guide w/Chemodenervation ([id number])        Primary Care Provider Office Phone # Fax #    Sonja Winchester EVI Abreu [facility] [phone]       [address] [facility] [address]  [facility] [address]        Equal Access to Services     NABIL GALEANO : Hadii aad ku hadasho Soomaali, waaxda luqadaha, qaybta kaalmada adeegyada, mike rodriguez . So [facility] [phone].    ATENCIÓN: Si habla español, tiene a livingston disposición servicios gratuitos de asistencia lingüística. Llame al [phone].    We comply with applicable federal civil rights laws and [place] laws. We do not discriminate on the basis of race, color, national origin, age, disability, sex, sexual orientation, or gender identity.            Thank you!     Thank you for choosing [facility] PHYSICAL MEDICINE " AND REHABILITATION  for your care. Our goal is always to provide you with excellent care. Hearing back from our patients is one way we can continue to improve our services. Please take a few minutes to complete the written survey that you may receive in the mail after your visit with us. Thank you!             Your Updated Medication List - Protect others around you: Learn how to safely use, store and throw away your medicines at www.disposemymeds.org.          This list is accurate as of 8/29/18 11:59 PM.  Always use your most recent med list.                   Brand Name Dispense Instructions for use Diagnosis    albuterol 108 (90 Base) MCG/ACT inhaler    PROAIR HFA/PROVENTIL HFA/VENTOLIN HFA    1 Inhaler    Inhale 2 puffs into the lungs every 6 hours as needed for shortness of breath / dyspnea or wheezing    Atypical chest pain       amitriptyline 50 MG tablet    ELAVIL    30 tablet    Take 1 tablet (50 mg) by mouth At Bedtime    Abdominal pain, generalized, Insomnia due to medical condition       * apremilast 30 MG tablet    OTEZLA    180 tablet    Take 1 tablet (30 mg) by mouth 2 times daily    Behcet's disease (H)       * Apremilast 10 & 20 & 30 MG Tbpk    OTEZLA    1 each    Take by mouth according to the instructions on the packet. Hold for signs of infection,any GI upset, weight loss or depression concerns, and seek medical attention.    Behcet's disease (H)       artificial tears Oint ophthalmic ointment     1 Tube    0.5 inch strip each eye at night    Bilateral dry eyes       ASPIRIN CHILDRENS 81 MG chewable tablet   Generic drug:  aspirin      Take 81 mg by mouth as needed        aspirin-acetaminophen-caffeine 250-250-65 MG per tablet    EXCEDRIN MIGRAINE     Take 1 tablet by mouth every 6 hours as needed for headaches        benzocaine 20 % Gel    TOPICALE XTRA    30 g    Apply as needed locally to mouth or nasal ulcers for pain; 4 times daily as needed    Behcet's disease (H)       betamethasone  valerate 0.1 % cream    VALISONE     Apply topically 2 times daily        * BOTOX IJ      Inject 165 Units as directed once Lot#: /C3 Exp: 10/2020        * BOTOX IJ      Inject 165 Units as directed once Lot # /C3  Exp:  10/2020        * botulinum toxin type A 100 units injection    BOTOX    200 Units    Inject 200 Units into the muscle every 3 months    Cervical dystonia       * BOTOX IJ      Inject 165 Units into the muscle once Lot /C3 Exp 06/2020        * BOTOX IJ      Inject 175 Units into the muscle once Lot # /C3 with Expiration Date:  11/2020        * BOTOX IJ      Inject 175 Units into the muscle Lot: [id number] Exp: 01/2021        clobetasol 0.05 % cream    TEMOVATE    60 g    Apply topically 2 times daily    Folliculitis       colchicine 0.6 MG tablet    COLCYRS    120 tablet    Take 0.6 mg in AM and 0.6mg in PM every day    Behcet's disease (H)       cyproheptadine 4 MG tablet    PERIACTIN    30 tablet    Take 1 tablet (4 mg) by mouth At Bedtime For nightmares    Nightmares       diclofenac 1 % Gel topical gel    VOLTAREN    100 g    Apply 2-4 grams to affected area(s) up to 4 times per day as needed. This is an anti-inflammatory medication.    Polyarthralgia       dicyclomine 20 MG tablet    BENTYL    120 tablet    Take 1 tablet (20 mg) by mouth 4 times daily as needed    Abdominal cramping       diltiazem 2% in PLO cream (FV COMPOUNDED) 2% Gel     30 g    Apply small pea size amount three times daily to anus until pain is gone.    Anal fissure       diphenhydrAMINE 25 MG tablet    BENADRYL    30 tablet    Take 1 tablet (25 mg) by mouth every 8 hours as needed for allergies        EPINEPHrine 0.3 MG/0.3ML injection 2-pack    EPIPEN 2-EAMON    0.6 mL    Inject 0.3 mLs (0.3 mg) into the muscle as needed for anaphylaxis    Hx of bee sting allergy       guanFACINE 1 MG tablet    TENEX    180 tablet    Take 3 tablets (3 mg) by mouth twice daily in the morning and evening.    Tic       *  hydrOXYzine 25 MG tablet    ATARAX    90 tablet    Take 1-2 tablets (25-50 mg) by mouth every 6 hours as needed for anxiety    TAHIR (generalized anxiety disorder)       * hydrOXYzine 25 MG tablet    ATARAX    60 tablet    Take 1-2 tablets (25-50 mg) by mouth every 6 hours as needed for itching    Urticaria       hyoscyamine 0.125 MG tablet    ANASPAZ/LEVSIN    40 tablet    Take 1-2 tablets (125-250 mcg) by mouth every 4 hours as needed for cramping    Abdominal pain, generalized       hypromellose 0.3 % Soln ophthalmic solution    GENTEAL     1 drop every hour as needed for dry eyes        LACTAID PO      Take by mouth daily        lactulose 20 GM/30ML Soln     300 mL    Take 30 mLs by mouth 3 times daily as needed    Constipation, unspecified constipation type       lidocaine 2 % topical gel    XYLOCAINE     Apply 1 Tube topically daily Reported on 4/21/2017        lidocaine visc 2% 2.5mL/5mL & maalox/mylanta w/ simeth 2.5mL/5mL & diphenhydrAMINE 5mg/5mL Susp suspension    [facility]    1 Bottle    Swish and swallow 10 mLs in mouth every 6 hours as needed for mouth sores    Behcet's syndrome (H)       linaclotide 145 MCG capsule    LINZESS    90 capsule    Take 1 capsule (145 mcg) by mouth every morning (before breakfast)    Chronic idiopathic constipation       Magnesium Aspartate HCl 1230 MG Pack     30 each    Take 1 packet by mouth daily as needed    Irritable bowel syndrome with constipation       norgestimate-ethinyl estradiol 0.25-35 MG-MCG per tablet    SPRINTEC 28    84 tablet    Take 1 tablet by mouth daily    General counseling for prescription of oral contraceptives       omeprazole 40 MG capsule    priLOSEC    90 capsule    Take 1 capsule (40 mg) by mouth daily    Gastroesophageal reflux disease, esophagitis presence not specified       ondansetron 8 MG tablet    ZOFRAN    60 tablet    Take 1 tablet (8 mg) by mouth every 8 hours as needed for nausea    Nausea       phenazopyridine HCl  tablet    AZO URINARY PAIN RELIEF    24 tablet    Take 2 tablets (190 mg) by mouth 3 times daily    Dysuria       predniSONE 20 MG tablet    DELTASONE    10 tablet    Take 1 tablet (20 mg) by mouth 2 times daily    Urticaria       sucralfate 1 GM/10ML suspension    CARAFATE    1200 mL    Take 10 mLs (1 g) by mouth 4 times daily    Bile reflux gastritis, Nausea       triamcinolone 0.1 % cream    KENALOG    80 g    Apply sparingly to lesions twice daily as needed    Behcet's syndrome (H)       * Notice:  This list has 10 medication(s) that are the same as other medications prescribed for you. Read the directions carefully, and ask your doctor or other care provider to review them with you.

## 2018-09-06 ENCOUNTER — MYC MEDICAL ADVICE (OUTPATIENT)
Dept: FAMILY MEDICINE | Facility: CLINIC | Age: 23
End: 2018-09-06

## 2018-09-06 DIAGNOSIS — R82.90 NONSPECIFIC FINDING ON EXAMINATION OF URINE: Primary | ICD-10-CM

## 2018-09-06 DIAGNOSIS — R30.0 DYSURIA: ICD-10-CM

## 2018-09-06 LAB
ALBUMIN UR-MCNC: 30 MG/DL
APPEARANCE UR: CLEAR
BACTERIA #/AREA URNS HPF: ABNORMAL /HPF
BILIRUB UR QL STRIP: NEGATIVE
COLOR UR AUTO: YELLOW
GLUCOSE UR STRIP-MCNC: NEGATIVE MG/DL
HGB UR QL STRIP: ABNORMAL
KETONES UR STRIP-MCNC: NEGATIVE MG/DL
LEUKOCYTE ESTERASE UR QL STRIP: ABNORMAL
NITRATE UR QL: POSITIVE
NON-SQ EPI CELLS #/AREA URNS LPF: ABNORMAL /LPF
PH UR STRIP: 6 PH (ref 5–7)
RBC #/AREA URNS AUTO: ABNORMAL /HPF
SOURCE: ABNORMAL
SP GR UR STRIP: 1.02 (ref 1–1.03)
UROBILINOGEN UR STRIP-ACNC: 0.2 EU/DL (ref 0.2–1)
WBC #/AREA URNS AUTO: ABNORMAL /HPF

## 2018-09-06 PROCEDURE — 87088 URINE BACTERIA CULTURE: CPT | Performed by: FAMILY MEDICINE

## 2018-09-06 PROCEDURE — 87186 SC STD MICRODIL/AGAR DIL: CPT | Performed by: FAMILY MEDICINE

## 2018-09-06 PROCEDURE — 87086 URINE CULTURE/COLONY COUNT: CPT | Performed by: NURSE PRACTITIONER

## 2018-09-06 PROCEDURE — 81001 URINALYSIS AUTO W/SCOPE: CPT | Performed by: NURSE PRACTITIONER

## 2018-09-06 RX ORDER — SULFAMETHOXAZOLE/TRIMETHOPRIM 800-160 MG
1 TABLET ORAL 2 TIMES DAILY
Qty: 6 TABLET | Refills: 0 | Status: SHIPPED | OUTPATIENT
Start: 2018-09-06 | End: 2018-09-09

## 2018-09-06 NOTE — TELEPHONE ENCOUNTER
Duplicate-- see [employer]t message today 09/06/18.    Criselda Sanabria RN  [facility]

## 2018-09-10 LAB
BACTERIA SPEC CULT: ABNORMAL
BACTERIA SPEC CULT: ABNORMAL
SPECIMEN SOURCE: ABNORMAL

## 2018-09-11 ENCOUNTER — OFFICE VISIT (OUTPATIENT)
Dept: PSYCHOLOGY | Facility: CLINIC | Age: 23
End: 2018-09-11
Payer: COMMERCIAL

## 2018-09-11 DIAGNOSIS — F33.1 MAJOR DEPRESSIVE DISORDER, RECURRENT EPISODE, MODERATE (H): ICD-10-CM

## 2018-09-11 DIAGNOSIS — F41.1 GAD (GENERALIZED ANXIETY DISORDER): Primary | ICD-10-CM

## 2018-09-11 PROCEDURE — 90834 PSYTX W PT 45 MINUTES: CPT | Performed by: COUNSELOR

## 2018-09-11 ASSESSMENT — ANXIETY QUESTIONNAIRES
GAD7 TOTAL SCORE: 12
7. FEELING AFRAID AS IF SOMETHING AWFUL MIGHT HAPPEN: SEVERAL DAYS
2. NOT BEING ABLE TO STOP OR CONTROL WORRYING: SEVERAL DAYS
6. BECOMING EASILY ANNOYED OR IRRITABLE: MORE THAN HALF THE DAYS
IF YOU CHECKED OFF ANY PROBLEMS ON THIS QUESTIONNAIRE, HOW DIFFICULT HAVE THESE PROBLEMS MADE IT FOR YOU TO DO YOUR WORK, TAKE CARE OF THINGS AT HOME, OR GET ALONG WITH OTHER PEOPLE: SOMEWHAT DIFFICULT
1. FEELING NERVOUS, ANXIOUS, OR ON EDGE: NEARLY EVERY DAY
5. BEING SO RESTLESS THAT IT IS HARD TO SIT STILL: SEVERAL DAYS
3. WORRYING TOO MUCH ABOUT DIFFERENT THINGS: MORE THAN HALF THE DAYS

## 2018-09-11 ASSESSMENT — PATIENT HEALTH QUESTIONNAIRE - PHQ9: 5. POOR APPETITE OR OVEREATING: MORE THAN HALF THE DAYS

## 2018-09-11 NOTE — MR AVS SNAPSHOT
MRN:[id number]                      After Visit Summary   9/11/2018    Samara Oropeza    MRN: [id number]           Visit Information        Provider Department      9/11/2018 11:00 AM [facility]      Your next 10 appointments already scheduled     Sep 25, 2018 11:00 AM CDT   Return Visit with YESSICA Lynch   [facility]    [facility]  [address]  [address]  [facility] [id number] [phone]            Oct 16, 2018 10:30 AM CDT   Return Visit with Austen Marquez MD   [facility]    [address] [place] [id number] [phone]            Nov 13, 2018  3:40 PM CST   (Arrive by 3:25 PM)   Return Botox with Alejandrina Hernandez MD   [facility] ([facility])    [address] [place] [id number] [phone]              MyChart Information     CME gives you secure access to your electronic health record. If you see a primary care provider, you can also send messages to your care team and make appointments. If you have questions, please call your primary care clinic.  If you do not have a primary care provider, please call [phone] and they will assist you.        Care EveryWhere ID     This is your Care EveryWhere ID. This could be used by other organizations to access your [facility] medical records  [id number]        Equal Access to Services     NABIL GALEANO : Hadii aad ku hadasho Soomaali, waaxda luqadaha, qaybta kaalmada adeegyada, mike parsons. So [facility] [phone].    ATENCIÓN: Si habla español, tiene a livingston disposición servicios gratuitos de asistencia lingüística. Llame al [phone].    We comply with applicable federal civil rights laws and [place]  laws. We do not discriminate on the basis of race, color, national origin, age, disability, sex, sexual orientation, or gender identity.
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
## 2018-09-12 ASSESSMENT — PATIENT HEALTH QUESTIONNAIRE - PHQ9: SUM OF ALL RESPONSES TO PHQ QUESTIONS 1-9: 10

## 2018-09-12 ASSESSMENT — ANXIETY QUESTIONNAIRES: GAD7 TOTAL SCORE: 12

## 2018-09-27 NOTE — MR AVS SNAPSHOT
After Visit Summary   9/19/2017    Samara Oropeza    MRN: [id number]           Patient Information     Date Of Birth          1995        Visit Information        Provider Department      9/19/2017 11:30 AM Austen Marquez MD [facility]        Today's Diagnoses     Behcet's disease (H)    -  1    Other seizures (H)        Visual floaters, bilateral           Follow-ups after your visit        Additional Services     NEUROLOGY ADULT REFERRAL       Your provider has referred you for the following:   Consult at [facility]: Neurology [facility] [phone]   [email or web address]  Epilepsy    Please be aware that coverage of these services is subject to the terms and limitations of your health insurance plan.  Call member services at your health plan with any benefit or coverage questions.      Please bring the following with you to your appointment:    (1) Any X-Rays, CTs or MRIs which have been performed.  Contact the facility where they were done to arrange for  prior to your scheduled appointment.    (2) List of current medications  (3) This referral request   (4) Any documents/labs given to you for this referral            OPHTHALMOLOGY ADULT REFERRAL       Your provider has referred you to: [facility]: Eye [facility] [phone]   [email or web address]    Please be aware that coverage of these services is subject to the terms and limitations of your health insurance plan.  Call member services at your health plan with any benefit or coverage questions.      Please bring the following with you to your appointment:    (1) Any X-Rays, CTs or MRIs which have been performed.  Contact the facility where they were done to arrange for  prior to your scheduled appointment.    (2) List of current medications  (3) This referral request   (4) Any documents/labs given to you for this  Called pt-prescription faxed to [facility], 09/27/18    Ashlie Singh/JORGE     referral                  Follow-up notes from your care team     Return in about 4 weeks (around 10/17/2017).      Your next 10 appointments already scheduled     Sep 22, 2017  2:50 PM CDT   LUIZ Extremity with Jennifer Alvarenga PT   [facility] ([facility])    [address] [place] [address] [address] [facility] [id number] [phone]            Sep 26, 2017  3:15 PM CDT   Return Visit with Emily Rollins PT   [facility] ([facility])    [address]  [address]  [facility] [phone]            Sep 27, 2017  8:30 AM CDT   Return Visit with EVI Dahl CNP   [facility] ([facility])    [address]  [address] [place] [id number] [phone]            Sep 27, 2017 11:00 AM CDT   Return Visit with Kimo Hudson, PAULETTE   [facility] ([facility])    [address] [place] [id number] [phone]            Oct 09, 2017  2:15 PM CDT   Return Visit with Cata Hurst NP   [facility] ([facility])    [address]  [address]  [facility] [id number] [phone]            Oct 10, 2017 11:45 AM CDT   Return Visit with Emily Rollins PT   [facility] ([facility])    [address] [facility] [address]  [address] [phone]            Oct 11, 2017  1:00 PM CDT   (Arrive by 12:45 PM)   Return Visit with EVI Vizcaino CNP   [facility] ([facility])    [address] [place] [address]  4th [facility] [address]   [phone]            Dec 06, 2017  3:00 PM CST   (Arrive by 2:45 PM)   Return Botox with Frederick Bender MD   [facility] ([facility])    [address] [place] [address]  3rd [facility]  "[id number] [phone]              Who to contact     If you have questions or need follow up information about today's clinic visit or your schedule please contact [facility] directly at [phone].  Normal or non-critical lab and imaging results will be communicated to you by MyChart, letter or phone within 4 business days after the clinic has received the results. If you do not hear from us within 7 days, please contact the clinic through MyChart or phone. If you have a critical or abnormal lab result, we will notify you by phone as soon as possible.  Submit refill requests through [employer] or call your pharmacy and they will forward the refill request to us. Please allow 3 business days for your refill to be completed.          Additional Information About Your Visit        [employer] Body Information     [employer] lets you send messages to your doctor, view your test results, renew your prescriptions, schedule appointments and more. To sign up, go to [email or web address] . Click on \"Log in\" on the left side of the screen, which will take you to the Welcome page. Then click on \"Sign up Now\" on the right side of the page.     You will be asked to enter the access code listed below, as well as some personal information. Please follow the directions to create your username and password.     Your access code is: [email or web address]  Expires: 10/29/2017 10:26 AM     Your access code will  in 90 days. If you need help or a new code, please call your [facility] clinic or [phone].        Care EveryWhere ID     This is your Care EveryWhere ID. This could be used by other organizations to access your [facility] medical records  [id number]        Your Vitals Were     Pulse Temperature Height Pulse Oximetry BMI (Body Mass Index)       105 98  F (36.7  C) (Oral) 1.6 m (5' 3\") 99% 25.33 kg/m2        Blood Pressure from Last 3 Encounters:   17 102/68   17 109/65   17 94/62    Weight " from Last 3 Encounters:   09/19/17 64.9 kg (143 lb)   09/13/17 64.9 kg (143 lb)   08/31/17 66.2 kg (146 lb)              We Performed the Following     NEUROLOGY ADULT REFERRAL     OPHTHALMOLOGY ADULT REFERRAL          Today's Medication Changes          These changes are accurate as of: 9/19/17 12:05 PM.  If you have any questions, ask your nurse or doctor.               Start taking these medicines.        Dose/Directions    methylPREDNISolone 4 MG tablet   Commonly known as:  MEDROL DOSEPAK   Used for:  Behcet's disease (H)   Started by:  Austen Marquez MD        Dose:  8 mg   Take 2 tablets (8 mg) by mouth See Admin Instructions follow package directions   Quantity:  21 tablet   Refills:  0         These medicines have changed or have updated prescriptions.        Dose/Directions    amitriptyline 25 MG tablet   Commonly known as:  ELAVIL   This may have changed:  how much to take   Used for:  Atypical chest pain, Insomnia, unspecified type        Dose:  25 mg   Take 1 tablet (25 mg) by mouth At Bedtime   Quantity:  45 tablet   Refills:  5       * botulinum toxin type A 100 UNITS injection   Commonly known as:  BOTOX   This may have changed:  Another medication with the same name was removed. Continue taking this medication, and follow the directions you see here.   Used for:  Cervical dystonia   Changed by:  Vidya Bryson RN        Dose:  200 Units   Inject 200 Units into the muscle every 3 months   Quantity:  200 Units   Refills:  3       * ONABOTULINUMTOXINA IJ   This may have changed:  Another medication with the same name was removed. Continue taking this medication, and follow the directions you see here.   Changed by:  Frederick Bender MD        Dose:  165 Units   Inject 165 Units as directed once Lot# /C3 Expiration: 04/2020   Refills:  0       * Notice:  This list has 2 medication(s) that are the same as other medications prescribed for you. Read the directions  carefully, and ask your doctor or other care provider to review them with you.      Stop taking these medicines if you haven't already. Please contact your care team if you have questions.     BusPIRone HCl 30 MG Tabs   Stopped by:  Austen Marquez MD                Where to get your medicines      These medications were sent to [facility] - [place], [place] - [address], [place], [facility] [address]     Phone:  [phone]     methylPREDNISolone 4 MG tablet                Primary Care Provider Office Phone # Fax #    Sonja Abreu, APRN [phone]       [address]  [facility] [address]        Equal Access to Services     NABIL GALEANO : Hadii maloclm brady hadasho Soteddyali, waaxda luqadaha, qaybta kaalmada adeegyada, mike rodriugez . So [facility] [phone].    ATENCIÓN: Si habla español, tiene a livingston disposición servicios gratuitos de asistencia lingüística. Jesus al [phone].    We comply with applicable federal civil rights laws and [place] laws. We do not discriminate on the basis of race, color, national origin, age, disability sex, sexual orientation or gender identity.            Thank you!     Thank you for choosing [facility]  for your care. Our goal is always to provide you with excellent care. Hearing back from our patients is one way we can continue to improve our services. Please take a few minutes to complete the written survey that you may receive in the mail after your visit with us. Thank you!             Your Updated Medication List - Protect others around you: Learn how to safely use, store and throw away your medicines at www.disposemymeds.org.          This list is accurate as of: 9/19/17 12:05 PM.  Always use your most recent med list.                   Brand Name Dispense Instructions for use Diagnosis    albuterol 108 (90 BASE) MCG/ACT Inhaler     PROAIR HFA/PROVENTIL HFA/VENTOLIN HFA    1 Inhaler    Inhale 2 puffs into the lungs every 6 hours as needed for shortness of breath / dyspnea or wheezing    Atypical chest pain       amitriptyline 25 MG tablet    ELAVIL    45 tablet    Take 1 tablet (25 mg) by mouth At Bedtime    Atypical chest pain, Insomnia, unspecified type       apremilast 30 MG tablet    OTEZLA    60 tablet    20 mg in AM and 30mg in PM daily X 2 weeks and then 30mg twice daily.    Behcet's disease (H)       ASPIRIN CHILDRENS 81 MG chewable tablet   Generic drug:  aspirin      Take 81 mg by mouth as needed        betamethasone valerate 0.1 % cream    VALISONE     Apply topically 2 times daily        * botulinum toxin type A 100 UNITS injection    BOTOX    200 Units    Inject 200 Units into the muscle every 3 months    Cervical dystonia       * ONABOTULINUMTOXINA IJ      Inject 165 Units as directed once Lot# /C3 Expiration: 04/2020        carbamide peroxide 6.5 % otic solution    DEBROX     5 drops 2 times daily        clobetasol 0.05 % cream    TEMOVATE    60 g    Apply topically 2 times daily    Folliculitis       Colchicine 0.6 MG Caps     90 capsule    Take 0.6 mg by mouth See Admin Instructions 2 capsules in AM and 1 capsule in PM    Behcet's syndrome (H)       diclofenac 1 % Gel topical gel    VOLTAREN    100 g    Apply 2-4 grams to affected area(s) up to 4 times per day as needed. This is an anti-inflammatory medication.    Polyarthralgia       dicyclomine 20 MG tablet    BENTYL    60 tablet    Take 1 tablet (20 mg) by mouth 4 times daily as needed    Abdominal cramping       diphenhydrAMINE 25 MG tablet    BENADRYL    30 tablet    Take 1 tablet (25 mg) by mouth every 8 hours as needed for allergies        EPINEPHrine 0.3 MG/0.3ML injection 2-pack    EPIPEN 2-EAMON    0.6 mL    Inject 0.3 mLs (0.3 mg) into the muscle as needed for anaphylaxis    Hx of bee sting allergy       EXCEDRIN MIGRAINE PO      Take by mouth as needed         guanFACINE 1 MG tablet    TENEX    180 tablet    Take 3 tablets (3 mg) by mouth twice daily in the morning and evening daily.    Tic       hydrOXYzine 25 MG tablet    ATARAX    60 tablet    Take 1-2 tablets (25-50 mg) by mouth every 6 hours as needed for anxiety    TAHIR (generalized anxiety disorder)       hyoscyamine 0.125 MG tablet    ANASPAZ/LEVSIN    40 tablet    Take 1-2 tablets (125-250 mcg) by mouth every 4 hours as needed for cramping    Abdominal pain, generalized       hypromellose 0.3 % Soln ophthalmic solution    GENTEAL     1 drop every hour as needed        LACTAID PO      Take by mouth daily        lactulose 20 GM/30ML Soln     300 mL    Take 30 mLs by mouth 3 times daily as needed        lidocaine 2 % topical gel    XYLOCAINE     Apply 1 Tube topically daily Reported on 4/21/2017        lidocaine visc 2% 2.5mL/5mL & maalox/mylanta w/ simeth 2.5mL/5mL & diphenhydrAMINE 5mg/5mL Susp suspension    [facility]    1 Bottle    Swish and swallow 10 mLs in mouth every 6 hours as needed for mouth sores    Behcet's syndrome (H)       linaclotide 290 MCG capsule    LINZESS    90 capsule    Take 1 capsule (290 mcg) by mouth every morning (before breakfast)    Irritable bowel syndrome       LORazepam 1 MG tablet    ATIVAN    90 tablet    Take 0.5 tablets (0.5 mg) by mouth 2 times daily as needed for other (abdominal pain) Do not operate a vehicle after taking this medication    Chronic abdominal pain       meclizine 25 MG tablet    ANTIVERT    30 tablet    Take 1 tablet (25 mg) by mouth every 6 hours as needed for dizziness    Nausea       metaxalone 800 MG tablet    SKELAXIN    30 tablet    Take 0.5 tablets (400 mg) by mouth 3 times daily as needed for moderate pain    Sprain of neck, initial encounter, Cervical dystonia       methylPREDNISolone 4 MG tablet    MEDROL DOSEPAK    21 tablet    Take 2 tablets (8 mg) by mouth See Admin Instructions follow package directions    Behcet's disease (H)        norgestimate-ethinyl estradiol 0.25-35 MG-MCG per tablet    ORTHO-CYCLEN, SPRINTEC    84 tablet    Take 1 tablet by mouth daily    General counseling for prescription of oral contraceptives       omeprazole 40 MG capsule    priLOSEC    90 capsule    Take 1 capsule (40 mg) by mouth daily    Gastroesophageal reflux disease, esophagitis presence not specified       ondansetron 8 MG ODT tab    ZOFRAN-ODT    60 tablet    Take 1 tablet (8 mg) by mouth every 8 hours as needed for nausea    Non-intractable vomiting with nausea, unspecified vomiting type, Hematemesis, presence of nausea not specified       order for DME     1 Device    Post op shoe    Contusion of right foot, initial encounter       promethazine 25 MG tablet    PHENERGAN    20 tablet    Take 0.5-1 tablets (12.5-25 mg) by mouth every 6 hours as needed for nausea    Intractable chronic migraine without aura and without status migrainosus       sucralfate 1 GM/10ML suspension    CARAFATE    1200 mL    Take 10 mLs (1 g) by mouth 4 times daily    Bile reflux gastritis, Nausea       triamcinolone 0.1 % cream    KENALOG    15 g    Apply sparingly to oral ulcers three times daily for 14 days as needed.    Behcet's syndrome (H)       * Notice:  This list has 2 medication(s) that are the same as other medications prescribed for you. Read the directions carefully, and ask your doctor or other care provider to review them with you.

## 2018-10-02 DIAGNOSIS — F51.5 NIGHTMARES: ICD-10-CM

## 2018-10-03 RX ORDER — CYPROHEPTADINE HYDROCHLORIDE 4 MG/1
TABLET ORAL
Qty: 30 TABLET | Refills: 2 | OUTPATIENT
Start: 2018-10-03

## 2018-10-14 DIAGNOSIS — L50.9 URTICARIA: ICD-10-CM

## 2018-10-14 DIAGNOSIS — F95.9 TIC: ICD-10-CM

## 2018-10-15 NOTE — TELEPHONE ENCOUNTER
"Requested Prescriptions   Pending Prescriptions Disp Refills   . guanFACINE (TENEX) 1 MG tablet      Last Written Prescription Date:  08/15/2018  Last Fill Quantity: 180,   # refills: 1  Last Office Visit: 08/15/2018  Future Office visit:    Next 5 appointments (look out 90 days)     Oct 16, 2018 12:00 PM CDT   Return Visit with Ernestina Patel [place] [id number] [place] [address]   [phone]                   Routing refill request to provider for review/approval because:  Drug not on the [facility], P or [facility] refill protocol or controlled substance   180 tablet 1                 hydrOXYzine (ATARAX) 25 MG tablet [Pharmacy Med Name: HydrOXYzine HCl 25 MG Oral Tablet] 60 tablet 1    Last Written Prescription Date:  08/15/18  Last Fill Quantity: 60,  # refills: 1   Last office visit: 8/15/2018 with prescribing provider:  08/15/2018   Future Office Visit:   Next 5 appointments (look out 90 days)     Oct 16, 2018 12:00 PM CDT   Return Visit with Ernestina Patel [place] [address] [address]   [phone]                  Sig: Take 1-2 tablets (25-50 mg) by mouth every 6 hours as needed for itching    Antihistamines Protocol Passed    10/14/2018  9:22 PM       Passed - Recent (12 mo) or future (30 days) visit within the authorizing provider's specialty    Patient had office visit in the last 12 months or has a visit in the next 30 days with authorizing provider or within the authorizing provider's specialty.  See \"Patient Info\" tab in inbasket, or \"Choose Columns\" in Meds & Orders section of the refill encounter.           Passed - Patient is age 3 or older    Apply age and/or weight-based dosing for peds patients age 3 and older.    Forward request to provider for patients under the age of 3.            "

## 2018-10-16 ENCOUNTER — OFFICE VISIT (OUTPATIENT)
Dept: PSYCHOLOGY | Facility: CLINIC | Age: 23
End: 2018-10-16
Payer: COMMERCIAL

## 2018-10-16 DIAGNOSIS — F33.1 MAJOR DEPRESSIVE DISORDER, RECURRENT EPISODE, MODERATE (H): Primary | ICD-10-CM

## 2018-10-16 DIAGNOSIS — F41.1 GAD (GENERALIZED ANXIETY DISORDER): ICD-10-CM

## 2018-10-16 PROCEDURE — 90834 PSYTX W PT 45 MINUTES: CPT | Performed by: COUNSELOR

## 2018-10-16 RX ORDER — HYDROXYZINE HYDROCHLORIDE 25 MG/1
TABLET, FILM COATED ORAL
Qty: 60 TABLET | Refills: 1 | Status: SHIPPED | OUTPATIENT
Start: 2018-10-16 | End: 2018-10-30

## 2018-10-16 ASSESSMENT — PATIENT HEALTH QUESTIONNAIRE - PHQ9: 5. POOR APPETITE OR OVEREATING: NEARLY EVERY DAY

## 2018-10-16 ASSESSMENT — ANXIETY QUESTIONNAIRES
GAD7 TOTAL SCORE: 14
IF YOU CHECKED OFF ANY PROBLEMS ON THIS QUESTIONNAIRE, HOW DIFFICULT HAVE THESE PROBLEMS MADE IT FOR YOU TO DO YOUR WORK, TAKE CARE OF THINGS AT HOME, OR GET ALONG WITH OTHER PEOPLE: VERY DIFFICULT
6. BECOMING EASILY ANNOYED OR IRRITABLE: MORE THAN HALF THE DAYS
5. BEING SO RESTLESS THAT IT IS HARD TO SIT STILL: MORE THAN HALF THE DAYS
1. FEELING NERVOUS, ANXIOUS, OR ON EDGE: NEARLY EVERY DAY
7. FEELING AFRAID AS IF SOMETHING AWFUL MIGHT HAPPEN: SEVERAL DAYS
2. NOT BEING ABLE TO STOP OR CONTROL WORRYING: SEVERAL DAYS
3. WORRYING TOO MUCH ABOUT DIFFERENT THINGS: MORE THAN HALF THE DAYS
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
## 2018-10-16 NOTE — Clinical Note
Phu Casillas, Due to client's complex health concerns, do you think she will be a good candidate for the integrative primary care clinic? Please advise.  Thank you, Ernestina Patel MA, [facility]

## 2018-10-16 NOTE — MR AVS SNAPSHOT
MRN:[id number]                      After Visit Summary   10/16/2018    Samara Oropeza    MRN: [id number]           Visit Information        Provider Department      10/16/2018 12:00 PM Ernestina Patel [facility] Generic      Your next 10 appointments already scheduled     Nov 07, 2018  2:30 PM CST   Return Visit with Ernestina Patel [facility]    [facility]  [address] [facility]  [facility] [id number] [phone]            Nov 13, 2018  3:40 PM CST   (Arrive by 3:25 PM)   Return Botox with Alejandrina Hernandez MD   [facility] ([facility])    [address] [place] [id number] [phone]            Nov 20, 2018 10:00 AM CST   Return Visit with Ernestina Patel [facility]    [facility]  [address] [place], [place] [id number]  [facility] [id number] [phone]              MyChart Information     [facility] gives you secure access to your electronic health record. If you see a primary care provider, you can also send messages to your care team and make appointments. If you have questions, please call your primary care clinic.  If you do not have a primary care provider, please call [phone] and they will assist you.        Care EveryWhere ID     This is your Care EveryWhere ID. This could be used by other organizations to access your [facility] medical records  [id number]        Equal Access to Services     NABIL GALEANO : Hadii aad ku hadasho Soomaali, waaxda luqadaha, qaybta kaalmada adeegyada, mike parsons. So [facility] [phone].    ATENCIÓN: Si habla español, tiene a [place] disposición servicios gratuitos de asistencia lingüística. Llame al [phone].    We comply with applicable federal civil rights laws and [place]  laws. We do not discriminate on the basis of race, color, national origin, age, disability, sex, sexual orientation, or gender identity.

## 2018-10-16 NOTE — TELEPHONE ENCOUNTER
Sonja,     Please review/sign for refill of guanfacine (Tenex) 1 mg tablet.     Last prescription 08/15/18 for 180 tabs, 1 refill (2 month supply).    Prescription approved per [facility] Refill Protocol. (atarax)    Criselda Sanabria RN  [facility]

## 2018-10-17 ASSESSMENT — ANXIETY QUESTIONNAIRES: GAD7 TOTAL SCORE: 14

## 2018-10-17 ASSESSMENT — PATIENT HEALTH QUESTIONNAIRE - PHQ9: SUM OF ALL RESPONSES TO PHQ QUESTIONS 1-9: 14

## 2018-10-17 NOTE — TELEPHONE ENCOUNTER
Sonja    Pt ran out yesterday, last few days has taken 2 vs 3 bid    She stated she was having withdrawal symptoms  Advised if severe she should go to the ED    Med keysha Matamoros RN   [facility]

## 2018-10-18 RX ORDER — GUANFACINE 1 MG/1
TABLET ORAL
Qty: 180 TABLET | Refills: 1 | Status: SHIPPED | OUTPATIENT
Start: 2018-10-18 | End: 2018-12-05

## 2018-10-26 ENCOUNTER — MYC REFILL (OUTPATIENT)
Dept: PSYCHIATRY | Facility: CLINIC | Age: 23
End: 2018-10-26

## 2018-10-26 DIAGNOSIS — F51.5 NIGHTMARES: ICD-10-CM

## 2018-10-26 RX ORDER — CYPROHEPTADINE HYDROCHLORIDE 4 MG/1
4 TABLET ORAL AT BEDTIME
Qty: 30 TABLET | Refills: 2 | Status: CANCELLED | OUTPATIENT
Start: 2018-10-26

## 2018-10-29 ENCOUNTER — PRE VISIT (OUTPATIENT)
Dept: NEUROLOGY | Facility: CLINIC | Age: 23
End: 2018-10-29

## 2018-10-29 NOTE — TELEPHONE ENCOUNTER
FUTURE VISIT INFORMATION      FUTURE VISIT INFORMATION:    Date: 10/30/18    Time: 2.30p    Location: [facility]  REFERRAL INFORMATION:    Referring provider:  elielwon    Referring providers clinic:  unknown    Reason for visit/diagnosis  MS    RECORDS REQUESTED FROM:       Clinic name Comments Records Status Imaging Status                                         RECORDS STATUS      10/29/18: Internal records and imaging in [facility]

## 2018-10-30 ENCOUNTER — APPOINTMENT (OUTPATIENT)
Dept: LAB | Facility: CLINIC | Age: 23
End: 2018-10-30
Payer: COMMERCIAL

## 2018-10-30 ENCOUNTER — OFFICE VISIT (OUTPATIENT)
Dept: NEUROLOGY | Facility: CLINIC | Age: 23
End: 2018-10-30
Attending: PSYCHIATRY & NEUROLOGY
Payer: COMMERCIAL

## 2018-10-30 VITALS
HEIGHT: 63 IN | BODY MASS INDEX: 27.79 KG/M2 | SYSTOLIC BLOOD PRESSURE: 124 MMHG | HEART RATE: 106 BPM | DIASTOLIC BLOOD PRESSURE: 79 MMHG

## 2018-10-30 DIAGNOSIS — M35.2 BEHCET'S DISEASE (H): Primary | ICD-10-CM

## 2018-10-30 LAB
ALBUMIN SERPL-MCNC: 3.7 G/DL (ref 3.4–5)
ALP SERPL-CCNC: 69 U/L (ref 40–150)
ALT SERPL W P-5'-P-CCNC: 35 U/L (ref 0–50)
ANION GAP SERPL CALCULATED.3IONS-SCNC: 8 MMOL/L (ref 3–14)
AST SERPL W P-5'-P-CCNC: 34 U/L (ref 0–45)
BASOPHILS # BLD AUTO: 0 10E9/L (ref 0–0.2)
BASOPHILS NFR BLD AUTO: 0.4 %
BILIRUB SERPL-MCNC: 0.3 MG/DL (ref 0.2–1.3)
BUN SERPL-MCNC: 13 MG/DL (ref 7–30)
CALCIUM SERPL-MCNC: 8.9 MG/DL (ref 8.5–10.1)
CHLORIDE SERPL-SCNC: 106 MMOL/L (ref 94–109)
CO2 SERPL-SCNC: 24 MMOL/L (ref 20–32)
CREAT SERPL-MCNC: 0.79 MG/DL (ref 0.52–1.04)
DIFFERENTIAL METHOD BLD: NORMAL
EOSINOPHIL # BLD AUTO: 0.1 10E9/L (ref 0–0.7)
EOSINOPHIL NFR BLD AUTO: 2.2 %
ERYTHROCYTE [DISTWIDTH] IN BLOOD BY AUTOMATED COUNT: 13.9 % (ref 10–15)
GFR SERPL CREATININE-BSD FRML MDRD: 89 ML/MIN/1.7M2
GLUCOSE SERPL-MCNC: 94 MG/DL (ref 70–99)
HCT VFR BLD AUTO: 39.9 % (ref 35–47)
HGB BLD-MCNC: 12.7 G/DL (ref 11.7–15.7)
IMM GRANULOCYTES # BLD: 0 10E9/L (ref 0–0.4)
IMM GRANULOCYTES NFR BLD: 0.2 %
IRON SATN MFR SERPL: 18 % (ref 15–46)
IRON SERPL-MCNC: 106 UG/DL (ref 35–180)
LYMPHOCYTES # BLD AUTO: 1.7 10E9/L (ref 0.8–5.3)
LYMPHOCYTES NFR BLD AUTO: 38.1 %
MCH RBC QN AUTO: 28.2 PG (ref 26.5–33)
MCHC RBC AUTO-ENTMCNC: 31.8 G/DL (ref 31.5–36.5)
MCV RBC AUTO: 89 FL (ref 78–100)
MISCELLANEOUS TEST: NORMAL
MONOCYTES # BLD AUTO: 0.3 10E9/L (ref 0–1.3)
MONOCYTES NFR BLD AUTO: 7.4 %
NEUTROPHILS # BLD AUTO: 2.3 10E9/L (ref 1.6–8.3)
NEUTROPHILS NFR BLD AUTO: 51.7 %
NRBC # BLD AUTO: 0 10*3/UL
NRBC BLD AUTO-RTO: 0 /100
PLATELET # BLD AUTO: 213 10E9/L (ref 150–450)
POTASSIUM SERPL-SCNC: 4 MMOL/L (ref 3.4–5.3)
PROT SERPL-MCNC: 7.5 G/DL (ref 6.8–8.8)
RBC # BLD AUTO: 4.51 10E12/L (ref 3.8–5.2)
SODIUM SERPL-SCNC: 138 MMOL/L (ref 133–144)
TIBC SERPL-MCNC: 582 UG/DL (ref 240–430)
TSH SERPL DL<=0.005 MIU/L-ACNC: 1.06 MU/L (ref 0.4–4)
WBC # BLD AUTO: 4.5 10E9/L (ref 4–11)

## 2018-10-30 PROCEDURE — 83540 ASSAY OF IRON: CPT | Performed by: PSYCHIATRY & NEUROLOGY

## 2018-10-30 PROCEDURE — 85025 COMPLETE CBC W/AUTO DIFF WBC: CPT | Performed by: PSYCHIATRY & NEUROLOGY

## 2018-10-30 PROCEDURE — 80053 COMPREHEN METABOLIC PANEL: CPT | Performed by: PSYCHIATRY & NEUROLOGY

## 2018-10-30 PROCEDURE — G0463 HOSPITAL OUTPT CLINIC VISIT: HCPCS | Mod: ZF

## 2018-10-30 PROCEDURE — 84999 UNLISTED CHEMISTRY PROCEDURE: CPT | Performed by: PSYCHIATRY & NEUROLOGY

## 2018-10-30 PROCEDURE — 83550 IRON BINDING TEST: CPT | Performed by: PSYCHIATRY & NEUROLOGY

## 2018-10-30 PROCEDURE — 36415 COLL VENOUS BLD VENIPUNCTURE: CPT | Performed by: PSYCHIATRY & NEUROLOGY

## 2018-10-30 PROCEDURE — 86255 FLUORESCENT ANTIBODY SCREEN: CPT | Performed by: PSYCHIATRY & NEUROLOGY

## 2018-10-30 PROCEDURE — 84443 ASSAY THYROID STIM HORMONE: CPT | Performed by: PSYCHIATRY & NEUROLOGY

## 2018-10-30 ASSESSMENT — ENCOUNTER SYMPTOMS
HOARSE VOICE: 1
EYE PAIN: 1
BOWEL INCONTINENCE: 0
EYE WATERING: 1
PANIC: 0
NECK PAIN: 1
JOINT SWELLING: 1
MEMORY LOSS: 1
SMELL DISTURBANCE: 0
PARALYSIS: 0
INSOMNIA: 1
WHEEZING: 0
ABDOMINAL PAIN: 1
CHILLS: 1
BLOOD IN STOOL: 0
MYALGIAS: 1
BACK PAIN: 1
HYPOTENSION: 1
NECK MASS: 0
DYSPNEA ON EXERTION: 0
LIGHT-HEADEDNESS: 1
INCREASED ENERGY: 1
HEARTBURN: 1
JAUNDICE: 0
HALLUCINATIONS: 0
MUSCLE CRAMPS: 1
ALTERED TEMPERATURE REGULATION: 1
WEIGHT GAIN: 1
TASTE DISTURBANCE: 1
COUGH: 1
WEIGHT LOSS: 1
DIZZINESS: 1
ORTHOPNEA: 0
LEG PAIN: 1
DECREASED CONCENTRATION: 0
NERVOUS/ANXIOUS: 1
POLYDIPSIA: 1
SWOLLEN GLANDS: 0
EYE REDNESS: 1
NAUSEA: 1
MUSCLE WEAKNESS: 1
CONSTIPATION: 1
NIGHT SWEATS: 1
TINGLING: 1
HEMOPTYSIS: 0
DOUBLE VISION: 1
SPUTUM PRODUCTION: 0
RECTAL PAIN: 0
SORE THROAT: 1
SYNCOPE: 1
NUMBNESS: 1
SNORES LOUDLY: 0
PALPITATIONS: 1
DECREASED APPETITE: 1
BRUISES/BLEEDS EASILY: 1
HYPERTENSION: 0
SLEEP DISTURBANCES DUE TO BREATHING: 1
EXERCISE INTOLERANCE: 1
SINUS PAIN: 1
POSTURAL DYSPNEA: 0
SPEECH CHANGE: 0
COUGH DISTURBING SLEEP: 0
POLYPHAGIA: 0
DIARRHEA: 0
FATIGUE: 1
FEVER: 1
TROUBLE SWALLOWING: 1
SEIZURES: 0
LOSS OF CONSCIOUSNESS: 1
SINUS CONGESTION: 1
DEPRESSION: 1
SHORTNESS OF BREATH: 1
WEAKNESS: 1
EYE IRRITATION: 1
DISTURBANCES IN COORDINATION: 0
STIFFNESS: 1
BLOATING: 1
HEADACHES: 1
VOMITING: 0
ARTHRALGIAS: 1

## 2018-10-30 ASSESSMENT — PAIN SCALES - GENERAL: PAINLEVEL: SEVERE PAIN (7)

## 2018-10-30 NOTE — TELEPHONE ENCOUNTER
Message from [employer]:  Original authorizing provider: SADE Haddad YNESJuan A Oropeza would like a refill of the following medications:  cyproheptadine (PERIACTIN) 4 MG tablet [Cata Hurst NP]    Preferred pharmacy: [address]    Comment:      Medication renewals requested in this message routed to other providers:  colchicine (COLCYRS) 0.6 MG tablet [Austen Marquez MD]

## 2018-10-30 NOTE — PROGRESS NOTES
THE [facility] MULTIPLE SCLEROSIS CLINIC  NEW PATIENT EVALUATION/CONSULTATION    Referral source:   Danitza  [address] / [facility] [address]      Also followed by:   Sonja Abreu  [address]  [facility] [address]      PRINCIPAL NEUROLOGIC DIAGNOSIS: Deferred        HISTORY OF ILLNESS:    An opinion on this year old right handed genetic female  was requested by Dr. 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 The patient was accompanied by her boyfriend. Previous records (physician notes, laboratory reports, and radiology reports) and imaging studies were reviewed and summarized. My recommendations will be communicated back to the patient's physician(s) by mail.  Follow-up is expected to be with me.    The patient has had stomack problems since she was 6 weeks old and required hospitalization  The patient has been seen by Dr. 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  The patient has been followed since 12/05/14 with this problem.    [facility]. The patient had presented to the [facility] 05/06/2015 for a spell, sensory and motor deficits of left upper extremity and spell of eye rolling and some spells of shaking.  The patient underwent video-EEG with nonspecific findings, no clear evidence of epileptic activity was found (see report below).  The patient had brain MRI with and without contrast with no evidence of abnormal enhancing lesions intracranially with no leptomeningeal enhancement or any other enhancing lesions intracranially.  Head CT was reported as normal.       Over the years, the patient's symptoms have been worse.  The patient developed more fainting spells and ultimately ended up going for a  prolonged video EEG.  During the EEG, there was no evidence of any epileptogenic abnormality.  There was a concern for potential psychogenic fainting.  The patient was evaluated by both cardiology and sleep ultimately did not feel that there was anything structurally wrong with her sleep pattern or heart.    The patient also has long-standing history of headaches.  She has been treated with Botox here.  Overall, her Botox has been well controlling her headaches.    EEG December 2017:  SUMMARY OF 4 DAYS OF VIDEO EEG:  Video EEG during 4 days was normal. EEG day #2, after hyperventilation, the patient reported tingling in her hands and feet.  She did not have alteration of awareness and behavioral testing was done.  There were no EEG changes to suggest seizure activity.  On day 3 she had another spell during hyperventilation where she felt funny and her hands and feet felt prickly/pressure in head.  She later explained that as a tingling sensation.  Orientation questions were completed and patient was able to answer everything and had no overt loss of awareness during this time.  Again, EEG during this time had no seizure activity.  During the 4 days of video EEG recording, patient had no electrographic seizures, no epileptiform discharges and her EEG was essentially normal.  Current Symptoms:  1.Stomach issues: The patient reports that she has severe constipation, mobile cecum, gastropresis, ibs. She also has significant abdominal pain. She reports that the her abdominal pain can be cramping, stabbing, and twisting. Sometimes it can be a pressure. Sometimes the pain can be cold or burning. The pain can get up to a 10/10 in severity. The pain can be associated with nausea. The pain is constantly there. The patient reports that eating makes her pain worse. Heating pads make it better. Ativan can make her symptoms better.  Overall, patient reports that this has been getting worse over time. She was seen at [facility]  for this and she was told that she had pelvic floor problems and needed rehab for the pelvic floor and [facility]   2. Migraines: The patient reports that cornelius can get floaters and tunnel vision in her eyes when she has her headache. The patient reports that sometimes that she can lose her vision during the headaches briefly. She also  Will get tinnitus. She reports that her headaches will typically be associated with burning, stabbing, and throbbing type pain. She reports that pain is typically bilateral but can occur on both sides. The pain is typically worse on the left. Prior to starting botox, she was getting headaches daily. The patient will typically give her a prolong period of time, where she does not have migraines. Typically she has migraines the two weeks after the injection and once  A week before the next injection is due. The patient reports that she first started getting headaches in elementary school.  3. Dizziness: The patient reports that she has dizzy spells. She will get the sensation that the room is spinning. The patient reports that will pass out. The patient reports that she will be down from a few seconds to a few minutes. The patient denies any urinary symptoms. She can have some mild shaking. Her eys are typically closed during these episodes. The patient will be tired after the episode. The patient will have shaking. The patient will typically know when she is going to have an episode because she feels warm, rapid heart rate and shaking. The patient reports that this can happen after exercising and being in a room with artificial light. These can happen also unprovoked and without warning. The patient reports that these spells having only happen once per month, which is a significant decrease from a year ago.  4. Paresthesia: The patient reports that she has diffuse body pain and discomfort. The patient say it is hard to describe the pain, but  feels like it is a constant soreness. The patient reports that reports that movement makes it worse initially and gets mild better during the exervise. The patient reports that warmth makes it better. The patient reports that it is worse first thing in the morning and at night. This has been getting worse over time.       PAST HISTORY:  Past Medical History:   Diagnosis Date     Anxiety      Arthritis      Behcet's disease (H)      Cervical adenitis May 2010     Chronic abdominal pain      Constipation, chronic 1994     Gastro-oesophageal reflux disease      Gastroparesis      Migraines      Neuromuscular disorder (H)     fibramyalgia     Palpitations      Seizure (H)      Seizures (H)     unknown etiology     Syncope      Tourette's        Past Surgical History:   Procedure Laterality Date     ARTHROSCOPY KNEE WITH PATELLAR REALIGNMENT  7/25/2013    Procedure: ARTHROSCOPY KNEE WITH PATELLAR REALIGNMENT;  Left Knee Arthroscopy, Medial Patellofemoral Ligament Reconstruction with Allograft  ;  Surgeon: Jennifer Acevedo MD;  [facility]: [facility]     COLONOSCOPY  2015     DENTAL SURGERY  1996    Teeth removal     ENDOSCOPY UPPER, COLONOSCOPY, COMBINED  2005     HC ESOPH/GAS REFLUX TEST W NASAL IMPED >1 HR N/A 2/15/2017    Procedure: ESOPHAGEAL IMPEDENCE FUNCTION TEST WITH 24 HOUR PH GREATER THAN 1 HOUR;  Surgeon: Timothy Matta MD;  Location:  GI              Current Outpatient Prescriptions:  Current Outpatient Prescriptions   Medication     albuterol (PROAIR HFA/PROVENTIL HFA/VENTOLIN HFA) 108 (90 BASE) MCG/ACT Inhaler     amitriptyline (ELAVIL) 50 MG tablet     Apremilast (OTEZLA) 10 & 20 & 30 MG TBPK     apremilast (OTEZLA) 30 MG tablet     artificial tears OINT ophthalmic ointment     aspirin (ASPIRIN CHILDRENS) 81 MG chewable tablet     aspirin-acetaminophen-caffeine (EXCEDRIN MIGRAINE) 250-250-65 MG per tablet     benzocaine (TOPICALE XTRA) 20 % GEL     betamethasone valerate (VALISONE) 0.1 % cream      botulinum toxin type A (BOTOX) 100 UNITS injection     clobetasol (TEMOVATE) 0.05 % cream     colchicine (COLCYRS) 0.6 MG tablet     cyproheptadine (PERIACTIN) 4 MG tablet     diclofenac (VOLTAREN) 1 % GEL topical gel     dicyclomine (BENTYL) 20 MG tablet     diltiazem 2% in PLO cream, FV COMPOUNDED, 2% GEL     diphenhydrAMINE (BENADRYL) 25 MG tablet     EPINEPHrine (EPIPEN 2-EAMON) 0.3 MG/0.3ML injection     guanFACINE (TENEX) 1 MG tablet     hydrOXYzine (ATARAX) 25 MG tablet     hydrOXYzine (ATARAX) 25 MG tablet     hyoscyamine (ANASPAZ/LEVSIN) 0.125 MG tablet     hypromellose (GENTEAL) 0.3 % SOLN     Lactase (LACTAID PO)     lactulose 20 GM/30ML SOLN     lidocaine (XYLOCAINE) 2 % jelly     linaclotide (LINZESS) 145 MCG capsule     Magic Mouthwash (FV std formula) lidocaine visc 2% 2.5mL/5mL & maalox/mylanta w/ simeth 2.5mL/5mL & diphenhydrAMINE 5mg/5mL     Magnesium Aspartate HCl 1230 MG PACK     norgestimate-ethinyl estradiol (SPRINTEC 28) 0.25-35 MG-MCG per tablet     omeprazole (PRILOSEC) 40 MG capsule     OnabotulinumtoxinA (BOTOX IJ)     OnabotulinumtoxinA (BOTOX IJ)     OnabotulinumtoxinA (BOTOX IJ)     OnabotulinumtoxinA (BOTOX IJ)     OnabotulinumtoxinA (BOTOX IJ)     ondansetron (ZOFRAN) 8 MG tablet     phenazopyridine HCl (AZO URINARY PAIN RELIEF) tablet     predniSONE (DELTASONE) 20 MG tablet     sucralfate (CARAFATE) 1 GM/10ML suspension     triamcinolone (KENALOG) 0.1 % cream     No current facility-administered medications for this visit.           ALLERGIES       Allergies   Allergen Reactions     Amoxil [Penicillins] Rash     Dad unsure of reaction.     Bee Venom Anaphylaxis     Contrast Dye Rash     Contrast Media Ready-[facility] [place], 04/09/2014.; Contrast [facility] [place], 04/09/2014.  NOTE: this is a contrast media oral with iodine. Premedicate with methylpred standard for IV contrast, request barium contrast for oral contrast.     Kiwi Swelling     Orange Fruit [Citrus] Anaphylaxis     Pineapple  "Anaphylaxis, Difficulty breathing and Rash     Reglan [Metoclopramide] Other (See Comments)     IV dose only, in ER, rapid heart rate.     Ace Inhibitors      Difficulty in breathing and GI upset     Amitiza [Lubiprostone] Nausea and Vomiting     Amoxicillin-Pot Clavulanate      Latex      Midazolam Unknown     Other reaction(s): Unknown  parent states that when pt takes this medication, she wakes up being very violent .  parent states that when pt takes this medication, she wakes up being very violent .     No Clinical Screening - See Comments      Bleech/ chest tightness, itchy throat, swollen tongue, hives     Versed      Coming out of pelvic exam at age of 5, was kicking and screaming when coming out of the versed.     Adhesive Tape Rash     Azithromycin Hives and Rash     Cephalexin Itching and Rash     Itchy mouth  Itchy mouth     Keflex [Cephalexin-Fd&C Yellow #6] Hives         Social History    Social History     Social History     Marital status: Single     Spouse name: N/A     Number of children: N/A     Years of education: N/A     Occupational History     Not on file.     Social History Main Topics     Smoking status: Never Smoker     Smokeless tobacco: Never Used     Alcohol use 0.6 oz/week     1 Standard drinks or equivalent per week      Comment: rarely     Drug use: No     Sexual activity: Yes     Partners: Male     Birth control/ protection: Pill     Other Topics Concern     Not on file     Social History Narrative    Boyfriend of 5 years, living with \"in laws\" Oct 2017 and looking forward to their own apartment.          FAMILY HISTORY     Family History   Problem Relation Age of Onset     Depression Mother      Neurologic Disorder Mother      Migraines, take imitrex injection.  Also in maternal grandmother.       Alcohol/Drug Father      Hypertension Father      Depression Father      Cardiovascular Maternal Grandmother      Depression Maternal Grandmother      Hypertension Maternal Grandmother  "     Alzheimer Disease Maternal Grandmother      Cardiovascular Maternal Grandfather      Hypertension Maternal Grandfather      Depression Maternal Grandfather      Alcohol/Drug Maternal Grandfather      Cardiovascular Paternal Grandmother      Hypertension Paternal Grandmother      Cardiovascular Paternal Grandfather      Hypertension Paternal Grandfather      Glaucoma No family hx of      Macular Degeneration No family hx of          REVIEW OF SYSTEMS:    Comprehensive review of systems otherwise was negative, including constitutional, head and neck, cardiovascular, pulmonary, gastrointestinal, endocrine, urologic, reproductive, rheumatic, hematologic, immunologic, dermatologic, and psychiatric.    Nutritional concerns: None  Driving issues: None   Safety concerns regarding living situations and safety at home: None  Risk of falls: None  Pain: None    PHYSICAL EXAM:    Hair, skin, nails, and joints were normal. Neck was supple without Lhermitte's phenomenon.  There was no percussion tenderness over the spine.     The patient was alert and oriented to person, place, and time with normal language, attention and concentration, recent and remote memory, praxis, and intellectual function. Affect was normal. The patient did not appear depressed.    Visual acuity:  OD 20/20-1  OS 20/20-1    Correction: with glasses    Visual fields were full to confrontation.   Pupils were 4 mm and briskly reactive OU without a relative afferent pupillary defect.  Funduscopic examination was normal without disc edema, erythema, or atrophy.  Extraocular movements: Intact without CLINT  Facial sensation is normal. Normal strength of the muscles of mastication:   Muscles of facial expression were normal  Hearing was normal. Gag reflex and palatal movements were normal. Sternocleidomastoid and trapezius power were normal. Tongue movements were normal. There was no dysarthria.    Motor Examination:   There was pronator drift. and The patient  gave poor effort and the exam was complicated by giveaway weakness She had evidence of bilateral hoovers      Motor    Upper      Right Left   Shoulder Abduction 5 5   Elbow Flexion 5 5   Elbow Extension 4 4   Wrist Extension 5 5   Digit Extension 4.5 4.5   Digit Flexion 4 3   APB 5 5   Tone 0 0   Lower       Right Left   Hip Flexion 4 4   Knee Extension 5 4   Knee Flexion 5 3   Foot Dorsiflexion 5 4   Foot Plantar Flexion 5 3   EH 5 4   Toe Flexion 5 4   Tone 0 0           Grade Description   0 No increase in muscle tone   1 Slight increase in muscle tone, manifested by a catch and release or by minimal resistance at the end of the range of motion when the affected part(s) is moved in flexion or extension   1+ Slight increase in muscle tone, manifested by a catch, followed by minimal resistance throughout the remainder (less than half) of the ROM   2 More marked increase in muscle tone through most of the ROM, but affected part(s) easily moved   3 Considerable increase in muscle tone, passive movement difficult   4 Affected part(s) rigid in flexion or extension             Reflexes:     Reflexes       Right  Left   Biceps 2  2   Triceps 2  2   Brachioradialis 2  2   Patellar  2  2   Achilles 2  2   Babinski down  down         Coordination:     Right Left   RRM Normal Normal   ILYA Normal Normal   FTN Normal Normal   RRM Normal Normal   HKS Normal Normal         Sensory examination:    Light touch:  Intact in all extremities, Pin Prick:  Intact in all extremities, Vibration:   Intact in all extremities, Prioperception:  Intact in all extremities and Temperature:  Intact in all extremities      Coordination and Gait        Gait Normal   Right Left   Romberg Normal  Heel Normal Normal   Tandem {Normal  Toe Normal Normal     REVIEW OF IMAGING STUDIES:    I personally reviewed the following images:    MRI brain 10/25/2017 and 5/6/2015: Scattered nonspecific T2 lesions that are in a pattern consistent with a history of  migraine.  There is not been any noticeable change in her MRI over 2-year period.    ASSESSMENT:    The patient is a 23-year-old female with a past medical history of Ms. Reed disease, nonepileptic spells, migraines, who is presenting today as a consult to be evaluated for the potential diagnosis of neuro-Behcet's.  Overall, the patient's history, exam, and imaging, not consistent with the diagnosis of neuro-Behcet's at this time.  I feel that the most likely explanation of her symptoms is a combination of fibromyalgia and her rheumatologic condition.  Due to the absence of findings to support neuro-Behcet's, I do not feel that any change needs to be made in her therapy.  The patient did inform me that she is unable to tolerate Otezla due to her p.o. symptoms.  I advised the patient to consider reaching out to a rheumatologist to see if there is anything else that can be done to make this more tolerable, or if she should switch medications.  Given that she is complaining of intolerance to cold, I feel that it would be reasonable to check her for thyroid disease.        Given that she has had prolonged vomiting, I will check an NMO to explore for area postrema  syndrome.  However, I admit that this is a very low likelihood diagnosis.  I think it would also be worthwhile for the patient to continue to follow with the headache group in order to get her Botox injections.  I do not think that another MRI needs to be done at this time.  The patient's MRI is largely been stable for the past 3 years.  Ergo, in the absence of a change in her symptoms, I do not think an MRI is indicated at this time.  I advised the patient to follow up in a year to see how she is doing.   I instructed the patient to call my office with any questions, concerns, issues, or problems.    PLAN:        Finally I will follow the patient up in 1 year(s) as long as the patient is doing well. I instructed the patient to call or mychart my office with  any concerns or questions.    I spent 45 minutes in this visit, with >50% direct patient time spent counseling about prognosis, treatment options, and coordination of care.    Joseph De La Torre MD [facility]  Staff Neurologist   10/30/18       (Chart documentation was completed in part with Dragon voice-recognition software. Even though reviewed, some grammatical, spelling, and word errors may remain.)       Answers for HPI/ROS submitted by the patient on 10/30/2018   General Symptoms: Yes  Skin Symptoms: Yes  HENT Symptoms: Yes  EYE SYMPTOMS: Yes  HEART SYMPTOMS: Yes  LUNG SYMPTOMS: Yes  INTESTINAL SYMPTOMS: Yes  URINARY SYMPTOMS: No  GYNECOLOGIC SYMPTOMS: No  BREAST SYMPTOMS: No  SKELETAL SYMPTOMS: Yes  BLOOD SYMPTOMS: Yes  NERVOUS SYSTEM SYMPTOMS: Yes  MENTAL HEALTH SYMPTOMS: Yes  Fever: Yes  Loss of appetite: Yes  Weight loss: Yes  Weight gain: Yes  Fatigue: Yes  Night sweats: Yes  Chills: Yes  Increased stress: Yes  Excessive hunger: No  Excessive thirst: Yes  Feeling hot or cold when others believe the temperature is normal: Yes  Loss of height: Yes  Post-operative complications: No  Surgical site pain: No  Hallucinations: No  Change in or Loss of Energy: Yes  Hyperactivity: No  Confusion: Yes  Ear pain: Yes  Ear discharge: Yes  Hearing loss: No  Tinnitus: Yes  Nosebleeds: Yes  Congestion: Yes  Sinus pain: Yes  Trouble swallowing: Yes   Voice hoarseness: Yes  Mouth sores: Yes  Sore throat: Yes  Tooth pain: Yes  Gum tenderness: Yes  Bleeding gums: Yes  Change in taste: Yes  Change in sense of smell: No  Dry mouth: Yes  Hearing aid used: No  Neck lump: No  Eye pain: Yes  Vision loss: No  Dry eyes: Yes  Watery eyes: Yes  Eye bulging: No  Double vision: Yes  Flashing of lights: No  Spots: Yes  Floaters: Yes  Redness: Yes  Crossed eyes: No  Tunnel Vision: No  Yellowing of eyes: No  Eye irritation: Yes  Cough: Yes  Sputum or phlegm: No  Coughing up blood: No  Difficulty breating or shortness of breath: Yes  Snoring:  No  Wheezing: No  Difficulty breathing on exertion: No  Nighttime Cough: No  Difficulty breathing when lying flat: No  Chest pain or pressure: Yes  Fast or irregular heartbeat: Yes  Pain in legs with walking: Yes  Trouble breathing while lying down: No  Fingers or toes appear blue: Yes  High blood pressure: No  Low blood pressure: Yes  Fainting: Yes  Murmurs: No  Pacemaker: No  Varicose veins: No  Wake up at night with shortness of breath: Yes  Light-headedness: Yes  Exercise intolerance: Yes  Heart burn or indigestion: Yes  Nausea: Yes  Vomiting: No  Abdominal pain: Yes  Bloating: Yes  Constipation: Yes  Diarrhea: No  Blood in stool: No  Black stools: No  Rectal or Anal pain: No  Fecal incontinence: No  Yellowing of skin or eyes: No  Vomit with blood: No  Change in stools: No  Back pain: Yes  Muscle aches: Yes  Neck pain: Yes  Swollen joints: Yes  Joint pain: Yes  Bone pain: No  Muscle cramps: Yes  Muscle weakness: Yes  Joint stiffness: Yes  Bone fracture: No  Anemia: Yes  Swollen glands: No  Easy bleeding or bruising: Yes  Trouble with coordination: No  Dizziness or trouble with balance: Yes  Fainting or black-out spells: Yes  Memory loss: Yes  Headache: Yes  Seizures: No  Speech problems: No  Tingling: Yes  Weakness: Yes  Difficulty walking: No  Paralysis: No  Numbness: Yes  Nervous or Anxious: Yes  Depression: Yes  Trouble sleeping: Yes  Trouble thinking or concentrating: No  Mood changes: Yes  Panic attacks: No

## 2018-10-30 NOTE — PATIENT INSTRUCTIONS
Will get blood work today    Will follow up in 1 years.    You saw a neurology provider today at the [facility].  You may have also met with the MS RN Care Coordinator.  In order to get a message to your [facility] Center provider, you should contact [phone] option 3 for the triage nurse line.    You should contact us via this protocol if you have any of the following symptoms:    New or worsening neurologic symptoms that persist for 24-48 hours, such as:  o New onset of pain or marked worsening of pain  o Difficulty with speech, swallowing, or breathing  o New onset of vertigo or dizziness  o Change in bowel or bladder function (incontinence, difficulty urinating)    Increasing difficulty in self care    Marked changes in vision (double vision, blurred vision, graying of vision)    Change in mobility    Change in cognitive function    Falling    Worsening numbness, tingling or pain with a change in function    Worsening fatigue lasting more than 2 weeks  If you had labs completed today, we will contact you with the results.  If you are active in [employer], they will be released to you there.  Otherwise, your results will be provided to you via mail or telephone call.  Some results take up to 2 weeks for completion.  If you haven t heard anything about your lab results within 2 weeks, you can call or send a [employer] message to obtain your results.  If you have an MRI scheduled in the week or two prior to your next appointment, we will go over the results at your scheduled follow up appointment.  If you are not scheduled to see your [facility] Center provider within about 2 weeks after your MRI, please call or send a [employer] message to obtain your results if you haven t heard anything within 2 weeks.  Please be aware that it takes at least 5 business days after routine MRIs for your results to be reviewed by both the radiologist and your doctor.  MRIs completed at facilities outside of the  [facility] system take about 2 weeks in order for the MRI disc to be mailed to our clinic and uploaded into your medical record.    Prescription refills should be faxed to us by your pharmacy.  Our fax number for prescription refills is [phone].  Please do your best to come to your appointments, and to arrive 15 minutes early to allow time for checking in.  [facility] reserves the right to terminate care of established patients if a patient misses three or more appointments in a clinic without providing notification within a 12-month period.    Developing Your Care Team  Individuals living with chronic illnesses like MS may be unaware that they are at risk for the same range of medical problems as everyone else.  This is why you must establish a relationship with other health care providers in addition to your Multiple Sclerosis doctor.  It can be difficult at times to figure out whether a health concern is related to your MS, or whether it is related to something else, such as hormonal changes, pseduoexacerbations, changes in your core body temperature, flu-like reactions to interferons, exercise, or infections.  Urinary tract infections (UTIs) are common culprits that can cause fatigue, weakness, or other  MS attack -like symptoms without classic symptoms of a UTI.  For this reason, if you call or come in to discuss symptoms, you may be asked to get in touch with your primary provider or another specialist, so that you receive the comprehensive care you need.  What is Multiple Sclerosis (MS)?  MS is a disease in which the nerve tissues in the brain and/or spinal cord are attacked by immune cells in the body.  These immune cells are present in everyone, and their normal role is to fight off infections.  In people with MS, these cells change the way they function and cross into the nervous system.  Once there, they cause inflammation that damages the myelin (or the protective coating of a  nerve cell, much like the plastic covering on an electrical cord) and parts of the nerve cell itself.  So far, a clear cause for this immune system dysfunction has not been found.  MS often starts out as the  relapsing-remitting  form.  This means there are episodes when you have symptoms, and other times when you recover to normal or near-normal.  Over time, if the damage to the nervous system continues, the disease can cause additional disability, such as difficulty walking.  If the relapses and nerve damage can be prevented with available medications, many patients with MS can go many years between relapses and have relatively little disability.  Remember: MS is a condition that changes and must be evaluated on an ongoing basis!  What is a Relapse? (Also called flare-ups, attacks, or exacerbations)  Relapses are due to the occurrence of inflammation in some part of the brain and/or spinal cord.  A relapse is new or recurrent symptoms which persist for at least 24 hours and sometimes worsen over 48 hours.  New symptoms need to be  by at least 1 month in order to be considered separate relapses. Most of the time, symptoms reach their maximal intensity within 2 weeks and then begin to slowly resolve.  At times, your symptoms may not recover fully for up to 6 months, depending on the severity of the episode.  The frequency of relapses is generally higher early in the disease, but can vary greatly among individuals with MS.  Improvement of symptoms for an individual is unpredictable with each relapse.    It is important to remember that an increase in symptoms and changes in function may not necessarily be a relapse.  There are other factors that contribute to such changes, such as hot weather, increased body temperature, infection/illness, stress and sleep deprivation.  The worsening of symptoms may feel like a relapse when in reality it is not.  These episodes are referred to as pseudorelapses.  Once the  underlying cause is addressed, symptoms usually fade away and you feel better.  If you experience a worsening of symptoms that lasts more than 48 hours and does not improve with cooling down, decreasing stress, or treatment of an infection, please call us and we can help to better determine whether you are having a pseudorelapse versus a relapse.

## 2018-10-30 NOTE — LETTER
10/30/2018       RE: Samara Oropeza  [address]  [address]  [place] [address]     Dear Colleague,    Thank you for referring your patient, Samara Oropeza, to the [facility] at [facility]. Please see a copy of my visit note below.    THE [facility] MULTIPLE SCLEROSIS CLINIC  NEW PATIENT EVALUATION/CONSULTATION    Referral source:   Danitza  [address] / [facility] [address]      Also followed by:   Sonja Abreu  [address]  [facility] [address]      PRINCIPAL NEUROLOGIC DIAGNOSIS: Deferred        HISTORY OF ILLNESS:    An opinion on this year old right handed genetic female  was requested by Dr. 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 The patient was accompanied by her boyfriend. Previous records (physician notes, laboratory reports, and radiology reports) and imaging studies were reviewed and summarized. My recommendations will be communicated back to the patient's physician(s) by mail.  Follow-up is expected to be with me.    The patient has had stomack problems since she was 6 weeks old and required hospitalization  The patient has been seen by Dr. 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   The patient has been followed since 12/05/14 with this problem.    [facility]. The patient had presented to the [facility] 05/06/2015 for a spell, sensory and motor deficits of left upper extremity and spell of eye rolling and some spells of shaking.  The patient underwent video-EEG with nonspecific findings, no clear evidence of epileptic activity was found (see report below).  The patient had brain MRI with and without contrast with no  evidence of abnormal enhancing lesions intracranially with no leptomeningeal enhancement or any other enhancing lesions intracranially.  Head CT was reported as normal.       Over the years, the patient's symptoms have been worse.  The patient developed more fainting spells and ultimately ended up going for a prolonged video EEG.  During the EEG, there was no evidence of any epileptogenic abnormality.  There was a concern for potential psychogenic fainting.  The patient was evaluated by both cardiology and sleep ultimately did not feel that there was anything structurally wrong with her sleep pattern or heart.    The patient also has long-standing history of headaches.  She has been treated with Botox here.  Overall, her Botox has been well controlling her headaches.    EEG December 2017:  SUMMARY OF 4 DAYS OF VIDEO EEG:  Video EEG during 4 days was normal. EEG day #2, after hyperventilation, the patient reported tingling in her hands and feet.  She did not have alteration of awareness and behavioral testing was done.  There were no EEG changes to suggest seizure activity.  On day 3 she had another spell during hyperventilation where she felt funny and her hands and feet felt prickly/pressure in head.  She later explained that as a tingling sensation.  Orientation questions were completed and patient was able to answer everything and had no overt loss of awareness during this time.  Again, EEG during this time had no seizure activity.  During the 4 days of video EEG recording, patient had no electrographic seizures, no epileptiform discharges and her EEG was essentially normal.  Current Symptoms:  1.Stomach issues: The patient reports that she has severe constipation, mobile cecum, gastropresis, ibs. She also has significant abdominal pain. She reports that the her abdominal pain can be cramping, stabbing, and twisting. Sometimes it can be a pressure. Sometimes the pain can be cold or burning. The pain can get up to  a 10/10 in severity. The pain can be associated with nausea. The pain is constantly there. The patient reports that eating makes her pain worse. Heating pads make it better. Ativan can make her symptoms better.  Overall, patient reports that this has been getting worse over time. She was seen at [facility] for this and she was told that she had pelvic floor problems and needed rehab for the pelvic floor and need [facility].   2. Migraines: The patient reports that cornelius can get floaters and tunnel vision in her eyes when she has her headache. The patient reports that sometimes that she can lose her vision during the headaches briefly. She also  Will get tinnitus. She reports that her headaches will typically be associated with burning, stabbing, and throbbing type pain. She reports that pain is typically bilateral but can occur on both sides. The pain is typically worse on the left. Prior to starting botox, she was getting headaches daily. The patient will typically give her a prolong period of time, where she does not have migraines. Typically she has migraines the two weeks after the injection and once  A week before the next injection is due. The patient reports that she first started getting headaches in elementary school.  3. Dizziness: The patient reports that she has dizzy spells. She will get the sensation that the room is spinning. The patient reports that will pass out. The patient reports that she will be down from a few seconds to a few minutes. The patient denies any urinary symptoms. She can have some mild shaking. Her eys are typically closed during these episodes. The patient will be tired after the episode. The patient will have shaking. The patient will typically know when she is going to have an episode because she feels warm, rapid heart rate and shaking. The patient reports that this can happen after exercising and being in a room with artificial light. These  can happen also unprovoked and without warning. The patient reports that these spells having only happen once per month, which is a significant decrease from a year ago.  4. Paresthesia: The patient reports that she has diffuse body pain and discomfort. The patient say it is hard to describe the pain, but feels like it is a constant soreness. The patient reports that reports that movement makes it worse initially and gets mild better during the exervise. The patient reports that warmth makes it better. The patient reports that it is worse first thing in the morning and at night. This has been getting worse over time.       PAST HISTORY:  Past Medical History:   Diagnosis Date     Anxiety      Arthritis      Behcet's disease (H)      Cervical adenitis May 2010     Chronic abdominal pain      Constipation, chronic 1994     Gastro-oesophageal reflux disease      Gastroparesis      Migraines      Neuromuscular disorder (H)     fibramyalgia     Palpitations      Seizure (H)      Seizures (H)     unknown etiology     Syncope      Tourette's        Past Surgical History:   Procedure Laterality Date     ARTHROSCOPY KNEE WITH PATELLAR REALIGNMENT  7/25/2013    Procedure: ARTHROSCOPY KNEE WITH PATELLAR REALIGNMENT;  Left Knee Arthroscopy, Medial Patellofemoral Ligament Reconstruction with Allograft  ;  Surgeon: Jennifer Acevedo MD;  Location: [facility]     COLONOSCOPY  2015     DENTAL SURGERY  1996    Teeth removal     ENDOSCOPY UPPER, COLONOSCOPY, COMBINED  2005     HC ESOPH/GAS REFLUX TEST W NASAL IMPED >1 HR N/A 2/15/2017    Procedure: ESOPHAGEAL IMPEDENCE FUNCTION TEST WITH 24 HOUR PH GREATER THAN 1 HOUR;  Surgeon: Timothy Matta MD;  Location: [facility]              Current Outpatient Prescriptions:  Current Outpatient Prescriptions   Medication     albuterol (PROAIR HFA/PROVENTIL HFA/VENTOLIN HFA) 108 (90 BASE) MCG/ACT Inhaler     amitriptyline (ELAVIL) 50 MG tablet     Apremilast (OTEZLA) 10 & 20 & 30 MG TBPK      apremilast (OTEZLA) 30 MG tablet     artificial tears OINT ophthalmic ointment     aspirin (ASPIRIN CHILDRENS) 81 MG chewable tablet     aspirin-acetaminophen-caffeine (EXCEDRIN MIGRAINE) 250-250-65 MG per tablet     benzocaine (TOPICALE XTRA) 20 % GEL     betamethasone valerate (VALISONE) 0.1 % cream     botulinum toxin type A (BOTOX) 100 UNITS injection     clobetasol (TEMOVATE) 0.05 % cream     colchicine (COLCYRS) 0.6 MG tablet     cyproheptadine (PERIACTIN) 4 MG tablet     diclofenac (VOLTAREN) 1 % GEL topical gel     dicyclomine (BENTYL) 20 MG tablet     diltiazem 2% in PLO cream, FV COMPOUNDED, 2% GEL     diphenhydrAMINE (BENADRYL) 25 MG tablet     EPINEPHrine (EPIPEN 2-EAMON) 0.3 MG/0.3ML injection     guanFACINE (TENEX) 1 MG tablet     hydrOXYzine (ATARAX) 25 MG tablet     hydrOXYzine (ATARAX) 25 MG tablet     hyoscyamine (ANASPAZ/LEVSIN) 0.125 MG tablet     hypromellose (GENTEAL) 0.3 % SOLN     Lactase (LACTAID PO)     lactulose 20 GM/30ML SOLN     lidocaine (XYLOCAINE) 2 % jelly     linaclotide (LINZESS) 145 MCG capsule     Magic Mouthwash (FV std formula) lidocaine visc 2% 2.5mL/5mL & maalox/mylanta w/ simeth 2.5mL/5mL & diphenhydrAMINE 5mg/5mL     Magnesium Aspartate HCl 1230 MG PACK     norgestimate-ethinyl estradiol (SPRINTEC 28) 0.25-35 MG-MCG per tablet     omeprazole (PRILOSEC) 40 MG capsule     OnabotulinumtoxinA (BOTOX IJ)     OnabotulinumtoxinA (BOTOX IJ)     OnabotulinumtoxinA (BOTOX IJ)     OnabotulinumtoxinA (BOTOX IJ)     OnabotulinumtoxinA (BOTOX IJ)     ondansetron (ZOFRAN) 8 MG tablet     phenazopyridine HCl (AZO URINARY PAIN RELIEF) tablet     predniSONE (DELTASONE) 20 MG tablet     sucralfate (CARAFATE) 1 GM/10ML suspension     triamcinolone (KENALOG) 0.1 % cream     No current facility-administered medications for this visit.           ALLERGIES       Allergies   Allergen Reactions     Amoxil [Penicillins] Rash     Dad unsure of reaction.     Bee Venom Anaphylaxis     Contrast Dye Rash  "    [place]rast [place] [facility] [place] [employer] [facility] [place].  NOTE: this is a contrast media oral with iodine. Premedicate with methylpred standard for IV contrast, request barium contrast for oral contrast.     Kiwi Swelling     Orange Fruit [Citrus] Anaphylaxis     Pineapple Anaphylaxis, Difficulty breathing and Rash     Reglan [Metoclopramide] Other (See Comments)     IV dose only, in ER, rapid heart rate.     Ace Inhibitors      Difficulty in breathing and GI upset     Amitiza [Lubiprostone] Nausea and Vomiting     Amoxicillin-Pot Clavulanate      Latex      Midazolam Unknown     Other reaction(s): Unknown  parent states that when pt takes this medication, she wakes up being very violent .  parent states that when pt takes this medication, she wakes up being very violent .     No Clinical Screening - See Comments      Bleech/ chest tightness, itchy throat, swollen tongue, hives     Versed      Coming out of pelvic exam at age of 5, was kicking and screaming when coming out of the versed.     Adhesive Tape Rash     Azithromycin Hives and Rash     Cephalexin Itching and Rash     Itchy mouth  Itchy mouth     Keflex [Cephalexin-Fd&C Yellow #6] Hives         Social History    Social History     Social History     Marital status: Single     Spouse name: N/A     Number of children: N/A     Years of education: N/A     Occupational History     Not on file.     Social History Main Topics     Smoking status: Never Smoker     Smokeless tobacco: Never Used     Alcohol use 0.6 oz/week     1 Standard drinks or equivalent per week      Comment: rarely     Drug use: No     Sexual activity: Yes     Partners: Male     Birth control/ protection: Pill     Other Topics Concern     Not on file     Social History Narrative    Boyfriend of 5 years, living with \"in laws\" Oct 2017 and looking forward to their own apartment.          FAMILY HISTORY     Family History   Problem Relation Age of Onset     " Depression Mother      Neurologic Disorder Mother      Migraines, take imitrex injection.  Also in maternal grandmother.       Alcohol/Drug Father      Hypertension Father      Depression Father      Cardiovascular Maternal Grandmother      Depression Maternal Grandmother      Hypertension Maternal Grandmother      Alzheimer Disease Maternal Grandmother      Cardiovascular Maternal Grandfather      Hypertension Maternal Grandfather      Depression Maternal Grandfather      Alcohol/Drug Maternal Grandfather      Cardiovascular Paternal Grandmother      Hypertension Paternal Grandmother      Cardiovascular Paternal Grandfather      Hypertension Paternal Grandfather      Glaucoma No family hx of      Macular Degeneration No family hx of          REVIEW OF SYSTEMS:    Comprehensive review of systems otherwise was negative, including constitutional, head and neck, cardiovascular, pulmonary, gastrointestinal, endocrine, urologic, reproductive, rheumatic, hematologic, immunologic, dermatologic, and psychiatric.    Nutritional concerns: None  Driving issues: None   Safety concerns regarding living situations and safety at home: None  Risk of falls: None  Pain: None    PHYSICAL EXAM:    Hair, skin, nails, and joints were normal. Neck was supple without Lhermitte's phenomenon.  There was no percussion tenderness over the spine.     The patient was alert and oriented to person, place, and time with normal language, attention and concentration, recent and remote memory, praxis, and intellectual function. Affect was normal. The patient did not appear depressed.    Visual acuity:  OD 20/20-1  OS 20/20-1    Correction: with glasses    Visual fields were full to confrontation.   Pupils were 4 mm and briskly reactive OU without a relative afferent pupillary defect.  Funduscopic examination was normal without disc edema, erythema, or atrophy.  Extraocular movements: Intact without CLINT  Facial sensation is normal. Normal strength of  the muscles of mastication:   Muscles of facial expression were normal  Hearing was normal. Gag reflex and palatal movements were normal. Sternocleidomastoid and trapezius power were normal. Tongue movements were normal. There was no dysarthria.    Motor Examination:   There was pronator drift. and The patient gave poor effort and the exam was complicated by giveaway weakness She had evidence of bilateral hoovers      Motor    Upper      Right Left   Shoulder Abduction 5 5   Elbow Flexion 5 5   Elbow Extension 4 4   Wrist Extension 5 5   Digit Extension 4.5 4.5   Digit Flexion 4 3   APB 5 5   Tone 0 0   Lower       Right Left   Hip Flexion 4 4   Knee Extension 5 4   Knee Flexion 5 3   Foot Dorsiflexion 5 4   Foot Plantar Flexion 5 3   EH 5 4   Toe Flexion 5 4   Tone 0 0           Grade Description   0 No increase in muscle tone   1 Slight increase in muscle tone, manifested by a catch and release or by minimal resistance at the end of the range of motion when the affected part(s) is moved in flexion or extension   1+ Slight increase in muscle tone, manifested by a catch, followed by minimal resistance throughout the remainder (less than half) of the ROM   2 More marked increase in muscle tone through most of the ROM, but affected part(s) easily moved   3 Considerable increase in muscle tone, passive movement difficult   4 Affected part(s) rigid in flexion or extension             Reflexes:     Reflexes       Right  Left   Biceps 2  2   Triceps 2  2   Brachioradialis 2  2   Patellar  2  2   Achilles 2  2   Babinski down  down         Coordination:     Right Left   RRM Normal Normal   ILYA Normal Normal   FTN Normal Normal   RRM Normal Normal   HKS Normal Normal         Sensory examination:    Light touch:  Intact in all extremities, Pin Prick:  Intact in all extremities, Vibration:   Intact in all extremities, Prioperception:  Intact in all extremities and Temperature:  Intact in all extremities      Coordination and  Gait        Gait Normal   Right Left   Romberg Normal  Heel Normal Normal   Tandem {Normal  Toe Normal Normal     REVIEW OF IMAGING STUDIES:    I personally reviewed the following images:    MRI brain 10/25/2017 and 5/6/2015: Scattered nonspecific T2 lesions that are in a pattern consistent with a history of migraine.  There is not been any noticeable change in her MRI over 2-year period.    ASSESSMENT:    The patient is a 23-year-old female with a past medical history of Ms. Reed disease, nonepileptic spells, migraines, who is presenting today as a consult to be evaluated for the potential diagnosis of neuro-Behcet's.  Overall, the patient's history, exam, and imaging, not consistent with the diagnosis of neuro-Behcet's at this time.  I feel that the most likely explanation of her symptoms is a combination of fibromyalgia and her rheumatologic condition.  Due to the absence of findings to support neuro-Behcet's, I do not feel that any change needs to be made in her therapy.  The patient did inform me that she is unable to tolerate Otezla due to her p.o. symptoms.  I advised the patient to consider reaching out to a rheumatologist to see if there is anything else that can be done to make this more tolerable, or if she should switch medications.  Given that she is complaining of intolerance to cold, I feel that it would be reasonable to check her for thyroid disease.        Given that she has had prolonged vomiting, I will check an NMO to explore for area postrema  syndrome.  However, I admit that this is a very low likelihood diagnosis.  I think it would also be worthwhile for the patient to continue to follow with the headache group in order to get her Botox injections.  I do not think that another MRI needs to be done at this time.  The patient's MRI is largely been stable for the past 3 years.  Ergo, in the absence of a change in her symptoms, I do not think an MRI is indicated at this time.  I advised the  patient to follow up in a year to see how she is doing.   I instructed the patient to call my office with any questions, concerns, issues, or problems.    PLAN:        Finally I will follow the patient up in 1 year(s) as long as the patient is doing well. I instructed the patient to call or mychart my office with any concerns or questions.    I spent 45 minutes in this visit, with >50% direct patient time spent counseling about prognosis, treatment options, and coordination of care.      (Chart documentation was completed in part with Dragon voice-recognition software. Even though reviewed, some grammatical, spelling, and word errors may remain.)      Again, thank you for allowing me to participate in the care of your patient.      Sincerely,    Joseph De La Torre MD

## 2018-10-30 NOTE — MR AVS SNAPSHOT
After Visit Summary   10/30/2018    Samara Oropeza    MRN: [id number]           Patient Information     Date Of Birth          1995        Visit Information        Provider Department      10/30/2018 2:30 PM Joseph De La Torre MD [facility] Multiple Sclerosis        Today's Diagnoses     Behcet's disease (H)    -  1      Care Instructions    Will get blood work today    Will follow up in 1 years.    You saw a neurology provider today at the [facility].  You may have also met with the MS RN Care Coordinator.  In order to get a message to your MS Center provider, you should contact [phone] option 3 for the triage nurse line.    You should contact us via this protocol if you have any of the following symptoms:    New or worsening neurologic symptoms that persist for 24-48 hours, such as:  o New onset of pain or marked worsening of pain  o Difficulty with speech, swallowing, or breathing  o New onset of vertigo or dizziness  o Change in bowel or bladder function (incontinence, difficulty urinating)    Increasing difficulty in self care    Marked changes in vision (double vision, blurred vision, graying of vision)    Change in mobility    Change in cognitive function    Falling    Worsening numbness, tingling or pain with a change in function    Worsening fatigue lasting more than 2 weeks  If you had labs completed today, we will contact you with the results.  If you are active in [employer], they will be released to you there.  Otherwise, your results will be provided to you via mail or telephone call.  Some results take up to 2 weeks for completion.  If you haven t heard anything about your lab results within 2 weeks, you can call or send a [employer] message to obtain your results.  If you have an MRI scheduled in the week or two prior to your next appointment, we will go over the results at your scheduled follow up appointment.  If you are not scheduled  to see your MS Center provider within about 2 weeks after your MRI, please call or send a [employer] message to obtain your results if you haven t heard anything within 2 weeks.  Please be aware that it takes at least 5 business days after routine MRIs for your results to be reviewed by both the radiologist and your doctor.  MRIs completed at facilities outside of the [place] system take about 2 weeks in order for the MRI disc to be mailed to our clinic and uploaded into your medical record.    Prescription refills should be faxed to us by your pharmacy.  Our fax number for prescription refills is [phone].  Please do your best to come to your appointments, and to arrive 15 minutes early to allow time for checking in.  [facility] reserves the right to terminate care of established patients if a patient misses three or more appointments in a clinic without providing notification within a 12-month period.    Developing Your Care Team  Individuals living with chronic illnesses like MS may be unaware that they are at risk for the same range of medical problems as everyone else.  This is why you must establish a relationship with other health care providers in addition to your Multiple Sclerosis doctor.  It can be difficult at times to figure out whether a health concern is related to your MS, or whether it is related to something else, such as hormonal changes, pseduoexacerbations, changes in your core body temperature, flu-like reactions to interferons, exercise, or infections.  Urinary tract infections (UTIs) are common culprits that can cause fatigue, weakness, or other  MS attack -like symptoms without classic symptoms of a UTI.  For this reason, if you call or come in to discuss symptoms, you may be asked to get in touch with your primary provider or another specialist, so that you receive the comprehensive care you need.  What is Multiple Sclerosis (MS)?  MS is a disease in which the  nerve tissues in the brain and/or spinal cord are attacked by immune cells in the body.  These immune cells are present in everyone, and their normal role is to fight off infections.  In people with MS, these cells change the way they function and cross into the nervous system.  Once there, they cause inflammation that damages the myelin (or the protective coating of a nerve cell, much like the plastic covering on an electrical cord) and parts of the nerve cell itself.  So far, a clear cause for this immune system dysfunction has not been found.  MS often starts out as the  relapsing-remitting  form.  This means there are episodes when you have symptoms, and other times when you recover to normal or near-normal.  Over time, if the damage to the nervous system continues, the disease can cause additional disability, such as difficulty walking.  If the relapses and nerve damage can be prevented with available medications, many patients with MS can go many years between relapses and have relatively little disability.  Remember: MS is a condition that changes and must be evaluated on an ongoing basis!  What is a Relapse? (Also called flare-ups, attacks, or exacerbations)  Relapses are due to the occurrence of inflammation in some part of the brain and/or spinal cord.  A relapse is new or recurrent symptoms which persist for at least 24 hours and sometimes worsen over 48 hours.  New symptoms need to be  by at least 1 month in order to be considered separate relapses. Most of the time, symptoms reach their maximal intensity within 2 weeks and then begin to slowly resolve.  At times, your symptoms may not recover fully for up to 6 months, depending on the severity of the episode.  The frequency of relapses is generally higher early in the disease, but can vary greatly among individuals with MS.  Improvement of symptoms for an individual is unpredictable with each relapse.    It is important to remember that an  increase in symptoms and changes in function may not necessarily be a relapse.  There are other factors that contribute to such changes, such as hot weather, increased body temperature, infection/illness, stress and sleep deprivation.  The worsening of symptoms may feel like a relapse when in reality it is not.  These episodes are referred to as pseudorelapses.  Once the underlying cause is addressed, symptoms usually fade away and you feel better.  If you experience a worsening of symptoms that lasts more than 48 hours and does not improve with cooling down, decreasing stress, or treatment of an infection, please call us and we can help to better determine whether you are having a pseudorelapse versus a relapse.                Follow-ups after your visit        Your next 10 appointments already scheduled     Oct 30, 2018  4:00 PM CDT   LAB with [facility])    [address] [place] [id number] [phone]           Please do not eat 10-12 hours before your appointment if you are coming in fasting for labs on lipids, cholesterol, or glucose (sugar). This does not apply to pregnant women. Water, hot tea and black coffee (with nothing added) are okay. Do not drink other fluids, diet soda or chew gum.            Nov 07, 2018  2:30 PM CST   Return Visit with Ernestina Patel [address] [address]   [phone]            Nov 13, 2018  3:40 PM CST   (Arrive by 3:25 PM)   Return Botox with Alejandrina Hernandez MD   [facility] ([facility])    [address] [place] [id number] [phone]            Nov 20, 2018 10:00 AM CST   Return Visit with Ernestina Patel [facility]  "[facility]  [address]  [facility] [address] [phone]            Oct 30, 2019  1:00 PM CDT   (Arrive by 12:45 PM)   Return Multiple Sclerosis with Joseph De La Torre MD   [facility] Multiple Sclerosis ([facility])    [address]  [address]  [facility] [id number] [phone]              Who to contact     If you have questions or need follow up information about today's clinic visit or your schedule please contact [facility] directly at [phone].  Normal or non-critical lab and imaging results will be communicated to you by [employer], letter or phone within 4 business days after the clinic has received the results. If you do not hear from us within 7 days, please contact the clinic through [employer] or phone. If you have a critical or abnormal lab result, we will notify you by phone as soon as possible.  Submit refill requests through [employer] or call your pharmacy and they will forward the refill request to us. Please allow 3 business days for your refill to be completed.          Additional Information About Your Visit        MyChart Information     [employer] gives you secure access to your electronic health record. If you see a primary care provider, you can also send messages to your care team and make appointments. If you have questions, please call your primary care clinic.  If you do not have a primary care provider, please call [phone] and they will assist you.        Care EveryWhere ID     This is your Care EveryWhere ID. This could be used by other organizations to access your [place] medical records  [id number]        Your Vitals Were     Pulse Height BMI (Body Mass Index)             106 1.588 m (5' 2.5\") 27.79 kg/m2          Blood Pressure from Last 3 Encounters:   10/30/18 124/79   08/29/18 120/82   08/15/18 124/82    Weight from Last 3 Encounters:   08/29/18 70 kg (154 lb 6.4 oz)   08/15/18 69.9 kg (154 lb 1.6 oz) "   07/23/18 67 kg (147 lb 9.6 oz)              We Performed the Following     CBC with platelets differential     Comprehensive metabolic panel     Iron and iron binding capacity     [place] Send out. Test ID: [id number] Reporting Name: CNS Demyelinating Disease Chris, S as: Laboratory Miscellaneous Order     TSH with free T4 reflex        Primary Care Provider Office Phone # Fax #    EVI Cardona [phone]       [address]  [facility] [address]        Equal Access to Services     NABIL GALEANO : Hadii aad ku hadasho Soomaali, waaxda luqadaha, qaybta kaalmada adeegyada, waxay idiin hayaan adeeg kharash la'arlet . So [facility] [phone].    ATENCIÓN: Si habla español, tiene a livingston disposición servicios gratuitos de asistencia lingüística. [facility] [phone].    We comply with applicable federal civil rights laws and [place] laws. We do not discriminate on the basis of race, color, national origin, age, disability, sex, sexual orientation, or gender identity.            Thank you!     Thank you for choosing [facility]  for your care. Our goal is always to provide you with excellent care. Hearing back from our patients is one way we can continue to improve our services. Please take a few minutes to complete the written survey that you may receive in the mail after your visit with us. Thank you!             Your Updated Medication List - Protect others around you: Learn how to safely use, store and throw away your medicines at www.disposemymeds.org.          This list is accurate as of 10/30/18  3:41 PM.  Always use your most recent med list.                   Brand Name Dispense Instructions for use Diagnosis    albuterol 108 (90 Base) MCG/ACT inhaler    PROAIR HFA/PROVENTIL HFA/VENTOLIN HFA    1 Inhaler    Inhale 2 puffs into the lungs every 6 hours as needed for shortness of breath / dyspnea or wheezing    Atypical chest pain       amitriptyline 50 MG tablet     ELAVIL    30 tablet    Take 1 tablet (50 mg) by mouth At Bedtime    Abdominal pain, generalized, Insomnia due to medical condition       * apremilast 30 MG tablet    OTEZLA    180 tablet    Take 1 tablet (30 mg) by mouth 2 times daily    Behcet's disease (H)       * Apremilast 10 & 20 & 30 MG Tbpk    OTEZLA    1 each    Take by mouth according to the instructions on the packet. Hold for signs of infection,any GI upset, weight loss or depression concerns, and seek medical attention.    Behcet's disease (H)       artificial tears Oint ophthalmic ointment     1 Tube    0.5 inch strip each eye at night    Bilateral dry eyes       ASPIRIN CHILDRENS 81 MG chewable tablet   Generic drug:  aspirin      Take 81 mg by mouth as needed        aspirin-acetaminophen-caffeine 250-250-65 MG per tablet    EXCEDRIN MIGRAINE     Take 1 tablet by mouth every 6 hours as needed for headaches        benzocaine 20 % Gel    TOPICALE XTRA    30 g    Apply as needed locally to mouth or nasal ulcers for pain; 4 times daily as needed    Behcet's disease (H)       betamethasone valerate 0.1 % cream    VALISONE     Apply topically 2 times daily        * BOTOX IJ      Inject 165 Units as directed once Lot#: /C3 Exp: 10/2020        * BOTOX IJ      Inject 165 Units as directed once Lot # /C3  Exp:  10/2020        * botulinum toxin type A 100 units injection    BOTOX    200 Units    Inject 200 Units into the muscle every 3 months    Cervical dystonia       * BOTOX IJ      Inject 165 Units into the muscle once Lot /C3 Exp 06/2020        * BOTOX IJ      Inject 175 Units into the muscle once Lot # /C3 with Expiration Date:  11/2020        * BOTOX IJ      Inject 175 Units into the muscle Lot: [id number] Exp: 01/2021        clobetasol 0.05 % cream    TEMOVATE    60 g    Apply topically 2 times daily    Folliculitis       colchicine 0.6 MG tablet    COLCYRS    180 tablet    Take 0.6 mg in AM and 0.6mg in PM every day    Behcet's disease  (H)       cyproheptadine 4 MG tablet    PERIACTIN    30 tablet    Take 1 tablet (4 mg) by mouth At Bedtime For nightmares    Nightmares       diclofenac 1 % Gel topical gel    VOLTAREN    100 g    Apply 2-4 grams to affected area(s) up to 4 times per day as needed. This is an anti-inflammatory medication.    Polyarthralgia       dicyclomine 20 MG tablet    BENTYL    120 tablet    Take 1 tablet (20 mg) by mouth 4 times daily as needed    Abdominal cramping       diltiazem 2% in PLO cream (FV COMPOUNDED) 2% Gel     30 g    Apply small pea size amount three times daily to anus until pain is gone.    Anal fissure       diphenhydrAMINE 25 MG tablet    BENADRYL    30 tablet    Take 1 tablet (25 mg) by mouth every 8 hours as needed for allergies        EPINEPHrine 0.3 MG/0.3ML injection 2-pack    EPIPEN 2-EAMON    0.6 mL    Inject 0.3 mLs (0.3 mg) into the muscle as needed for anaphylaxis    Hx of bee sting allergy       guanFACINE 1 MG tablet    TENEX    180 tablet    Take 3 tablets (3 mg) by mouth twice daily in the morning and evening.    Tic       hydrOXYzine 25 MG tablet    ATARAX    90 tablet    Take 1-2 tablets (25-50 mg) by mouth every 6 hours as needed for anxiety    TAHIR (generalized anxiety disorder)       hyoscyamine 0.125 MG tablet    ANASPAZ/LEVSIN    40 tablet    Take 1-2 tablets (125-250 mcg) by mouth every 4 hours as needed for cramping    Abdominal pain, generalized       hypromellose 0.3 % Soln ophthalmic solution    GENTEAL     1 drop every hour as needed for dry eyes        LACTAID PO      Take by mouth daily        lactulose 20 GM/30ML Soln     300 mL    Take 30 mLs by mouth 3 times daily as needed    Constipation, unspecified constipation type       lidocaine 2 % topical gel    XYLOCAINE     Apply 1 Tube topically daily Reported on 4/21/2017        lidocaine visc 2% 2.5mL/5mL & maalox/mylanta w/ simeth 2.5mL/5mL & diphenhydrAMINE 5mg/5mL Susp suspension    [facility] Mouthwash [facility]    1 Bottle    Swish  and swallow 10 mLs in mouth every 6 hours as needed for mouth sores    Behcet's syndrome (H)       linaclotide 145 MCG capsule    LINZESS    90 capsule    Take 1 capsule (145 mcg) by mouth every morning (before breakfast)    Chronic idiopathic constipation       Magnesium Aspartate HCl 1230 MG Pack     30 each    Take 1 packet by mouth daily as needed    Irritable bowel syndrome with constipation       norgestimate-ethinyl estradiol 0.25-35 MG-MCG per tablet    SPRINTEC 28    84 tablet    Take 1 tablet by mouth daily    General counseling for prescription of oral contraceptives       omeprazole 40 MG capsule    priLOSEC    90 capsule    Take 1 capsule (40 mg) by mouth daily    Gastroesophageal reflux disease, esophagitis presence not specified       ondansetron 8 MG tablet    ZOFRAN    60 tablet    Take 1 tablet (8 mg) by mouth every 8 hours as needed for nausea    Nausea       phenazopyridine HCl tablet    AZO URINARY PAIN RELIEF    24 tablet    Take 2 tablets (190 mg) by mouth 3 times daily    Dysuria       predniSONE 20 MG tablet    DELTASONE    10 tablet    Take 1 tablet (20 mg) by mouth 2 times daily    Urticaria       sucralfate 1 GM/10ML suspension    CARAFATE    1200 mL    Take 10 mLs (1 g) by mouth 4 times daily    Bile reflux gastritis, Nausea       triamcinolone 0.1 % cream    KENALOG    80 g    Apply sparingly to lesions twice daily as needed    Behcet's syndrome (H)       * Notice:  This list has 8 medication(s) that are the same as other medications prescribed for you. Read the directions carefully, and ask your doctor or other care provider to review them with you.

## 2018-10-31 ENCOUNTER — OFFICE VISIT (OUTPATIENT)
Dept: FAMILY MEDICINE | Facility: CLINIC | Age: 23
End: 2018-10-31
Payer: COMMERCIAL

## 2018-10-31 ENCOUNTER — MYC MEDICAL ADVICE (OUTPATIENT)
Dept: FAMILY MEDICINE | Facility: CLINIC | Age: 23
End: 2018-10-31

## 2018-10-31 VITALS
HEART RATE: 90 BPM | DIASTOLIC BLOOD PRESSURE: 62 MMHG | OXYGEN SATURATION: 97 % | BODY MASS INDEX: 27.79 KG/M2 | TEMPERATURE: 97.9 F | RESPIRATION RATE: 14 BRPM | SYSTOLIC BLOOD PRESSURE: 102 MMHG | HEIGHT: 63 IN

## 2018-10-31 DIAGNOSIS — Z11.3 SCREENING EXAMINATION FOR VENEREAL DISEASE: ICD-10-CM

## 2018-10-31 DIAGNOSIS — N10 ACUTE PYELONEPHRITIS: Primary | ICD-10-CM

## 2018-10-31 DIAGNOSIS — R82.90 NONSPECIFIC FINDING ON EXAMINATION OF URINE: ICD-10-CM

## 2018-10-31 LAB
BACTERIA #/AREA URNS HPF: ABNORMAL /HPF
NON-SQ EPI CELLS #/AREA URNS LPF: ABNORMAL /LPF
RBC #/AREA URNS AUTO: ABNORMAL /HPF
URNS CMNT MICRO: ABNORMAL
WBC #/AREA URNS AUTO: ABNORMAL /HPF

## 2018-10-31 PROCEDURE — 99214 OFFICE O/P EST MOD 30 MIN: CPT | Performed by: FAMILY MEDICINE

## 2018-10-31 PROCEDURE — 87086 URINE CULTURE/COLONY COUNT: CPT | Performed by: FAMILY MEDICINE

## 2018-10-31 PROCEDURE — 81015 MICROSCOPIC EXAM OF URINE: CPT | Performed by: FAMILY MEDICINE

## 2018-10-31 RX ORDER — SULFAMETHOXAZOLE/TRIMETHOPRIM 800-160 MG
1 TABLET ORAL 2 TIMES DAILY
Qty: 28 TABLET | Refills: 0 | Status: ON HOLD | OUTPATIENT
Start: 2018-10-31 | End: 2019-03-15

## 2018-10-31 NOTE — PROGRESS NOTES
"  SUBJECTIVE:   Samara Oropeza is a 23 year old female who presents to clinic today for the following health issues:      URINARY TRACT SYMPTOMS      Duration: ongoing issue    Description  frequency and back pain    Intensity:  moderate    Accompanying signs and symptoms:  Fever/chills: no   Flank pain no   Nausea and vomiting: no   Vaginal symptoms: itching  Abdominal/Pelvic Pain: YES    History  History of frequent UTI's: YES  History of kidney stones: no   Sexually Active: YES  Possibility of pregnancy: No    Precipitating or alleviating factors: None    Therapies tried and outcome: OTC (cannot recall)    Outcome: little relief      Has been getting them monthly since July.    Has had BV as well. Pain in her lower left abdomen as well.    ROS  Some nausea, but that is normal for her due to Behcetts'    EXAM:  /62 (BP Location: Left arm, Patient Position: Sitting, Cuff Size: Adult Regular)  Pulse 90  Temp 97.9  F (36.6  C) (Oral)  Resp 14  Ht 5' 2.5\" (1.588 m)  SpO2 97%  BMI 27.79 kg/m2  Constitutional: Healthy, alert, no distress   Cardiovascular: RRR. No murmurs   Respiratory: Clear to auscultation   GI: slight discomfort of abdominal pain.    ASSESSMENT    ICD-10-CM    1. Acute pyelonephritis N10 sulfamethoxazole-trimethoprim (BACTRIM DS/SEPTRA DS) 800-160 MG per tablet   2. Nonspecific finding on examination of urine R82.90 Urine Culture Aerobic Bacterial   3. Screening examination for venereal disease Z11.3       Plan:  Patient Instructions   Follow-up if not improving by next week.  Fluids.  Inquire with Sonja if she feels it would benefit you to see a urologist given your perception of frequent UTIs.     Given recurrence and symptoms, recommending prolonged course to cover for pyelonephritis, differential includes renal stones.    Return if symptoms worsen or fail to improve.    Josue Mcintyre MD  Family Medicine Physician     "

## 2018-10-31 NOTE — TELEPHONE ENCOUNTER
[employer] message sent to patient.    Criselda Sanabria RN  [facility]

## 2018-10-31 NOTE — PATIENT INSTRUCTIONS
Follow-up if not improving by next week.  Fluids.  Inquire with Sonja if she feels it would benefit you to see a urologist given your perception of frequent UTIs.

## 2018-10-31 NOTE — MR AVS SNAPSHOT
After Visit Summary   10/31/2018    Samara Oropeza    MRN: [id number]           Patient Information     Date Of Birth          1995        Visit Information        Provider Department      10/31/2018 3:15 PM Josue Liang MD [facility]        Today's Diagnoses     Urinary tract infection    -  1    Nonspecific finding on examination of urine        Screening examination for venereal disease        Acute pyelonephritis          Care Instructions    Follow-up if not improving by next week.  Fluids.  Inquire with Sonja if she feels it would benefit you to see a urologist given your perception of frequent UTIs.          Follow-ups after your visit        Your next 10 appointments already scheduled     Nov 07, 2018  2:30 PM CST   Return Visit with Ernestina Patel [facility] ([address] [id number] [phone]            Nov 13, 2018  3:40 PM CST   (Arrive by 3:25 PM)   Return Botox with Alejandrina Hernandez MD   [facility] ([facility])    [address] [place] [address] [id number] [phone]            Nov 20, 2018 10:00 AM CST   Return Visit with Ernestina Patel [facility] ([place] [address] [id number] [phone]            Oct 30, 2019  1:00 PM CDT   (Arrive by 12:45 PM)   Return Multiple Sclerosis with Joseph De La Torre MD   [facility] ([facility])    [address] [place] [id number] [phone]              Who to contact     If you have questions or need follow up information about today's clinic visit or your schedule please contact [facility] directly at [phone].  Normal or  "non-critical lab and imaging results will be communicated to you by MyChart, letter or phone within 4 business days after the clinic has received the results. If you do not hear from us within 7 days, please contact the clinic through [employer] or phone. If you have a critical or abnormal lab result, we will notify you by phone as soon as possible.  Submit refill requests through [employer] or call your pharmacy and they will forward the refill request to us. Please allow 3 business days for your refill to be completed.          Additional Information About Your Visit        [employer] Information     [employer] gives you secure access to your electronic health record. If you see a primary care provider, you can also send messages to your care team and make appointments. If you have questions, please call your primary care clinic.  If you do not have a primary care provider, please call [phone] and they will assist you.        Care EveryWhere ID     This is your Care EveryWhere ID. This could be used by other organizations to access your [facility] medical records  [id number]        Your Vitals Were     Pulse Temperature Respirations Height Pulse Oximetry BMI (Body Mass Index)    90 97.9  F (36.6  C) (Oral) 14 5' 2.5\" (1.588 m) 97% 27.79 kg/m2       Blood Pressure from Last 3 Encounters:   10/31/18 102/62   10/30/18 124/79   08/29/18 120/82    Weight from Last 3 Encounters:   08/29/18 154 lb 6.4 oz (70 kg)   08/15/18 154 lb 1.6 oz (69.9 kg)   07/23/18 147 lb 9.6 oz (67 kg)              We Performed the Following     Urine Culture Aerobic Bacterial     Urine Microscopic          Today's Medication Changes          These changes are accurate as of 10/31/18  3:48 PM.  If you have any questions, ask your nurse or doctor.               Start taking these medicines.        Dose/Directions    sulfamethoxazole-trimethoprim 800-160 MG per tablet   Commonly known as:  BACTRIM DS/SEPTRA DS   Used for:  Acute pyelonephritis   Started " by:  Josue Liang MD        Dose:  1 tablet   Take 1 tablet by mouth 2 times daily for 14 days   Quantity:  28 tablet   Refills:  0            Where to get your medicines      These medications were sent to [facility] - [address], [facility] [address]     Phone:  [phone]     sulfamethoxazole-trimethoprim 800-160 MG per tablet                Primary Care Provider Office Phone # Fax #    Sonja Winchester Brady, APRN [phone]       [address] [facility] [address]  [facility] [address]        Equal Access to Services     [facility]: Hadii aad ku hadasho Soteddyali, waaxda luqadaha, qaybta kaalmada adeegyada, waxjarred sanchesin hayarlet rodriguez . So [facility] [phone].    ATENCIÓN: Si habla español, tiene a livingston disposición servicios gratuitos de asistencia lingüística. Ll[facility] [phone].    We comply with applicable federal civil rights laws and [place] laws. We do not discriminate on the basis of race, color, national origin, age, disability, sex, sexual orientation, or gender identity.            Thank you!     Thank you for choosing [facility]  for your care. Our goal is always to provide you with excellent care. Hearing back from our patients is one way we can continue to improve our services. Please take a few minutes to complete the written survey that you may receive in the mail after your visit with us. Thank you!             Your Updated Medication List - Protect others around you: Learn how to safely use, store and throw away your medicines at www.disposemymeds.org.          This list is accurate as of 10/31/18  3:48 PM.  Always use your most recent med list.                   Brand Name Dispense Instructions for use Diagnosis    albuterol 108 (90 Base) MCG/ACT inhaler    PROAIR HFA/PROVENTIL HFA/VENTOLIN HFA    1 Inhaler    Inhale 2 puffs into the lungs every 6 hours as  needed for shortness of breath / dyspnea or wheezing    Atypical chest pain       amitriptyline 50 MG tablet    ELAVIL    30 tablet    Take 1 tablet (50 mg) by mouth At Bedtime    Abdominal pain, generalized, Insomnia due to medical condition       * apremilast 30 MG tablet    OTEZLA    180 tablet    Take 1 tablet (30 mg) by mouth 2 times daily    Behcet's disease (H)       * Apremilast 10 & 20 & 30 MG Tbpk    OTEZLA    1 each    Take by mouth according to the instructions on the packet. Hold for signs of infection,any GI upset, weight loss or depression concerns, and seek medical attention.    Behcet's disease (H)       artificial tears Oint ophthalmic ointment     1 Tube    0.5 inch strip each eye at night    Bilateral dry eyes       ASPIRIN CHILDRENS 81 MG chewable tablet   Generic drug:  aspirin      Take 81 mg by mouth as needed        aspirin-acetaminophen-caffeine 250-250-65 MG per tablet    EXCEDRIN MIGRAINE     Take 1 tablet by mouth every 6 hours as needed for headaches        benzocaine 20 % Gel    TOPICALE XTRA    30 g    Apply as needed locally to mouth or nasal ulcers for pain; 4 times daily as needed    Behcet's disease (H)       betamethasone valerate 0.1 % cream    VALISONE     Apply topically 2 times daily        * BOTOX IJ      Inject 165 Units as directed once Lot#: /C3 Exp: 10/2020        * BOTOX IJ      Inject 165 Units as directed once Lot # /C3  Exp:  10/2020        * botulinum toxin type A 100 units injection    BOTOX    200 Units    Inject 200 Units into the muscle every 3 months    Cervical dystonia       * BOTOX IJ      Inject 165 Units into the muscle once Lot /C3 Exp 06/2020        * BOTOX IJ      Inject 175 Units into the muscle once Lot # /C3 with Expiration Date:  11/2020        * BOTOX IJ      Inject 175 Units into the muscle Lot: [id number] Exp: 01/2021        clobetasol 0.05 % cream    TEMOVATE    60 g    Apply topically 2 times daily    Folliculitis        colchicine 0.6 MG tablet    COLCYRS    180 tablet    Take 0.6 mg in AM and 0.6mg in PM every day    Behcet's disease (H)       cyproheptadine 4 MG tablet    PERIACTIN    30 tablet    Take 1 tablet (4 mg) by mouth At Bedtime For nightmares    Nightmares       diclofenac 1 % Gel topical gel    VOLTAREN    100 g    Apply 2-4 grams to affected area(s) up to 4 times per day as needed. This is an anti-inflammatory medication.    Polyarthralgia       dicyclomine 20 MG tablet    BENTYL    120 tablet    Take 1 tablet (20 mg) by mouth 4 times daily as needed    Abdominal cramping       diltiazem 2% in PLO cream (FV COMPOUNDED) 2% Gel     30 g    Apply small pea size amount three times daily to anus until pain is gone.    Anal fissure       diphenhydrAMINE 25 MG tablet    BENADRYL    30 tablet    Take 1 tablet (25 mg) by mouth every 8 hours as needed for allergies        EPINEPHrine 0.3 MG/0.3ML injection 2-pack    EPIPEN 2-EAMON    0.6 mL    Inject 0.3 mLs (0.3 mg) into the muscle as needed for anaphylaxis    Hx of bee sting allergy       guanFACINE 1 MG tablet    TENEX    180 tablet    Take 3 tablets (3 mg) by mouth twice daily in the morning and evening.    Tic       hydrOXYzine 25 MG tablet    ATARAX    90 tablet    Take 1-2 tablets (25-50 mg) by mouth every 6 hours as needed for anxiety    TAHIR (generalized anxiety disorder)       hyoscyamine 0.125 MG tablet    ANASPAZ/LEVSIN    40 tablet    Take 1-2 tablets (125-250 mcg) by mouth every 4 hours as needed for cramping    Abdominal pain, generalized       hypromellose 0.3 % Soln ophthalmic solution    GENTEAL     1 drop every hour as needed for dry eyes        LACTAID PO      Take by mouth daily        lactulose 20 GM/30ML Soln     300 mL    Take 30 mLs by mouth 3 times daily as needed    Constipation, unspecified constipation type       lidocaine 2 % topical gel    XYLOCAINE     Apply 1 Tube topically daily Reported on 4/21/2017        lidocaine visc 2% 2.5mL/5mL &  maalox/mylanta w/ simeth 2.5mL/5mL & diphenhydrAMINE 5mg/5mL Susp suspension    [facility] M[facility] [place]    1 Bottle    Swish and swallow 10 mLs in mouth every 6 hours as needed for mouth sores    Behcet's syndrome (H)       linaclotide 145 MCG capsule    LINZESS    90 capsule    Take 1 capsule (145 mcg) by mouth every morning (before breakfast)    Chronic idiopathic constipation       Magnesium Aspartate HCl 1230 MG Pack     30 each    Take 1 packet by mouth daily as needed    Irritable bowel syndrome with constipation       norgestimate-ethinyl estradiol 0.25-35 MG-MCG per tablet    SPRINTEC 28    84 tablet    Take 1 tablet by mouth daily    General counseling for prescription of oral contraceptives       omeprazole 40 MG capsule    priLOSEC    90 capsule    Take 1 capsule (40 mg) by mouth daily    Gastroesophageal reflux disease, esophagitis presence not specified       ondansetron 8 MG tablet    ZOFRAN    60 tablet    Take 1 tablet (8 mg) by mouth every 8 hours as needed for nausea    Nausea       phenazopyridine HCl tablet    AZO URINARY PAIN RELIEF    24 tablet    Take 2 tablets (190 mg) by mouth 3 times daily    Dysuria       predniSONE 20 MG tablet    DELTASONE    10 tablet    Take 1 tablet (20 mg) by mouth 2 times daily    Urticaria       sucralfate 1 GM/10ML suspension    CARAFATE    1200 mL    Take 10 mLs (1 g) by mouth 4 times daily    Bile reflux gastritis, Nausea       sulfamethoxazole-trimethoprim 800-160 MG per tablet    BACTRIM DS/SEPTRA DS    28 tablet    Take 1 tablet by mouth 2 times daily for 14 days    Acute pyelonephritis       triamcinolone 0.1 % cream    KENALOG    80 g    Apply sparingly to lesions twice daily as needed    Behcet's syndrome (H)       * Notice:  This list has 8 medication(s) that are the same as other medications prescribed for you. Read the directions carefully, and ask your doctor or other care provider to review them with you.

## 2018-11-01 LAB
BACTERIA SPEC CULT: NO GROWTH
SPECIMEN SOURCE: NORMAL

## 2018-11-06 LAB
RESULT: NORMAL
SEND OUTS MISC TEST CODE: NORMAL
SEND OUTS MISC TEST SPECIMEN: NORMAL
TEST NAME: NORMAL

## 2018-11-07 ENCOUNTER — OFFICE VISIT (OUTPATIENT)
Dept: PSYCHOLOGY | Facility: CLINIC | Age: 23
End: 2018-11-07
Payer: COMMERCIAL

## 2018-11-07 DIAGNOSIS — F41.1 GAD (GENERALIZED ANXIETY DISORDER): ICD-10-CM

## 2018-11-07 DIAGNOSIS — F33.1 MAJOR DEPRESSIVE DISORDER, RECURRENT EPISODE, MODERATE (H): Primary | ICD-10-CM

## 2018-11-07 PROCEDURE — 90834 PSYTX W PT 45 MINUTES: CPT | Performed by: COUNSELOR

## 2018-11-07 ASSESSMENT — ANXIETY QUESTIONNAIRES
6. BECOMING EASILY ANNOYED OR IRRITABLE: MORE THAN HALF THE DAYS
1. FEELING NERVOUS, ANXIOUS, OR ON EDGE: NEARLY EVERY DAY
7. FEELING AFRAID AS IF SOMETHING AWFUL MIGHT HAPPEN: SEVERAL DAYS
IF YOU CHECKED OFF ANY PROBLEMS ON THIS QUESTIONNAIRE, HOW DIFFICULT HAVE THESE PROBLEMS MADE IT FOR YOU TO DO YOUR WORK, TAKE CARE OF THINGS AT HOME, OR GET ALONG WITH OTHER PEOPLE: SOMEWHAT DIFFICULT
3. WORRYING TOO MUCH ABOUT DIFFERENT THINGS: MORE THAN HALF THE DAYS
5. BEING SO RESTLESS THAT IT IS HARD TO SIT STILL: MORE THAN HALF THE DAYS
2. NOT BEING ABLE TO STOP OR CONTROL WORRYING: SEVERAL DAYS
GAD7 TOTAL SCORE: 14

## 2018-11-07 ASSESSMENT — PATIENT HEALTH QUESTIONNAIRE - PHQ9
5. POOR APPETITE OR OVEREATING: NEARLY EVERY DAY
SUM OF ALL RESPONSES TO PHQ QUESTIONS 1-9: 11
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
## 2018-11-07 NOTE — MR AVS SNAPSHOT
MRN:[id number]                      After Visit Summary   11/7/2018    Samara Oropeza    MRN: [id number]           Visit Information        Provider Department      11/7/2018 2:30 PM Ernestina Patel [facility] Generic      Your next 10 appointments already scheduled     Nov 13, 2018  3:40 PM CST   (Arrive by 3:25 PM)   Return Botox with Alejandrina Hernandez MD   [facility] ([facility])    [address] [address]   [phone]            Nov 20, 2018 10:00 AM CST   Return Visit with Ernestina Patel [facility] ([facility])    [facility]  [address] [address]   [phone]            Dec 11, 2018 12:00 PM CST   Return Visit with Ernestina Patel [facility] ([facility])    [facility]  [address]  [facility] [address]   [phone]            Dec 26, 2018 12:30 PM CST   Return Visit with Ernestina Patel [facility] ([facility])    [facility]  [address]  [facility] [address]   [phone]            Oct 30, 2019  1:00 PM CDT   (Arrive by 12:45 PM)   Return Multiple Sclerosis with Joseph De La Torre MD   [facility] ([facility])    [address] [address] [phone]              MyChart Information     [employer] gives you secure access to your electronic health record. If you see a primary care provider, you can also send messages to your care team and make appointments. If you have questions, please call your primary care clinic.  If you do not have a primary care provider, please call [phone] and they will assist you.        Care EveryWhere ID     This is your Care EveryWhere  ID. This could be used by other organizations to access your [place] medical records  [id number]        Equal Access to Services     NABIL GALEANO : Brandon Santiago, channing hurtado, clover juan, mike parsons. So [facility] [phone].    ATENCIÓN: Si habla español, tiene a livingston disposición servicios gratuitos de asistencia lingüística. Llame al [phone].    We comply with applicable federal civil rights laws and [place] laws. We do not discriminate on the basis of race, color, national origin, age, disability, sex, sexual orientation, or gender identity.

## 2018-11-08 ENCOUNTER — MYC MEDICAL ADVICE (OUTPATIENT)
Dept: FAMILY MEDICINE | Facility: CLINIC | Age: 23
End: 2018-11-08

## 2018-11-08 ENCOUNTER — TELEPHONE (OUTPATIENT)
Dept: FAMILY MEDICINE | Facility: CLINIC | Age: 23
End: 2018-11-08

## 2018-11-08 DIAGNOSIS — K64.9 HEMORRHOIDS, UNSPECIFIED HEMORRHOID TYPE: Primary | ICD-10-CM

## 2018-11-08 RX ORDER — HYDROCORTISONE ACETATE SUPPOSITORY 30 MG/1
SUPPOSITORY RECTAL
Qty: 28 SUPPOSITORY | Status: CANCELLED | OUTPATIENT
Start: 2018-11-08

## 2018-11-08 ASSESSMENT — ANXIETY QUESTIONNAIRES: GAD7 TOTAL SCORE: 14

## 2018-11-08 NOTE — TELEPHONE ENCOUNTER
Called patient, patient states she is constipated. Patient states she has not had bowel movement in 7 days.     Patient states her stomach is hard and tight. Patient states she is bloated. Patient states she is having stomach cramps and when she uses the bathroom, there is blood on the toilet paper. She denies active bleeding    Patient states the pain has been so intense at times she has thrown up. She is lightheaded and shaky. Has been unable to eat.    Currently, patient is taking antibiotics for UTI.    Patient also took a suppository, enema, lactulose and senna tabs.     Huddle with TC OK to double book with Dr. Roberts 10:15; 11:15; 2:15.    Patient agreed to schedule appointment with Dr. Roberts at 2:15.    Ksenia Medina RN  Triage Nurse

## 2018-11-08 NOTE — TELEPHONE ENCOUNTER
Sonja,    Please see mychart message from patient.    She sent a 2NGageU message today prior to this one requesting a prescription for proctocort. I responded by reinforcing our recommendation to go the the ER.     Please advise.    Criselda Sanabria RN  [facility]

## 2018-11-08 NOTE — TELEPHONE ENCOUNTER
Reason for call:  Other   Patient called regarding (reason for call): call back  Additional comments: The patient called and stated that her stomach has been hurting very bad and she is also having some bleeding. She said it was urgent that she be seen by Sonja Abreu, she was wondering if she could possibly be squeezed in today. She would like a call back to discuss this.    Phone number to reach patient:  Cell number on file:    Telephone Information:   Mobile [phone]       Best Time: Any    Can we leave a detailed message on this number?  YES

## 2018-11-08 NOTE — TELEPHONE ENCOUNTER
Huddled with Dr. Roberts who came to triage nurse's because pt was added to his schedule. D/t never having seen the pt and being unfamiliar with her history, provider recommended to discuss with PCP regarding recommendations.  Huddled with Sonja PCP, who recommended to advise pt to go to the ER for constipation. They can do x-ray to assess for impaction and give suppository as needed/manage pain.     Called patient, she stated that she had a BM yesterday and today. BM was extremely hard and about 2 1/2 inches in size. Also abdomen is hard/bloated. Advised patient of provider recommendation and that we recommend to go to the ER. Pt stated that she didn't want to go to the ER because they don't do anything, and she is just looking for pain medication to help with the pain/cramping. Discussed with patient that d/t s/s of irregular, hard BM, vomiting, there is a possibility of fecal impaction and if untreated can lead to further/bigger problems. Also discussed that ER can help with pain management, cannot be provided at our clinic. Pt verbalized understanding. Stated that she did not have any ride available until after 3 pm. Advised that she can wait for ride, but needs to call 911 if severe abdominal pain, cramping, persistent vomiting, or swelling. Pt verbalized understanding and in agreement with plan.    Criselda Sanabria RN  [facility]

## 2018-11-13 ENCOUNTER — OFFICE VISIT (OUTPATIENT)
Dept: PHYSICAL MEDICINE AND REHAB | Facility: CLINIC | Age: 23
End: 2018-11-13
Payer: COMMERCIAL

## 2018-11-13 VITALS
RESPIRATION RATE: 16 BRPM | WEIGHT: 154 LBS | BODY MASS INDEX: 27.29 KG/M2 | DIASTOLIC BLOOD PRESSURE: 80 MMHG | SYSTOLIC BLOOD PRESSURE: 119 MMHG | OXYGEN SATURATION: 99 % | HEART RATE: 131 BPM | HEIGHT: 63 IN | TEMPERATURE: 98.3 F

## 2018-11-13 DIAGNOSIS — G24.1 FRAGMENTS OF TORSION DYSTONIA: ICD-10-CM

## 2018-11-13 DIAGNOSIS — G43.719 INTRACTABLE CHRONIC MIGRAINE WITHOUT AURA AND WITHOUT STATUS MIGRAINOSUS: ICD-10-CM

## 2018-11-13 DIAGNOSIS — G24.3 SPASMODIC TORTICOLLIS: Primary | ICD-10-CM

## 2018-11-13 PROBLEM — E61.1 IRON DEFICIENCY ASSOCIATED WITH NONFAMILIAL RESTLESS LEGS SYNDROME: Status: ACTIVE | Noted: 2018-11-13

## 2018-11-13 PROBLEM — F34.1 DYSTHYMIC DISORDER: Status: ACTIVE | Noted: 2018-11-13

## 2018-11-13 PROBLEM — G25.81 IRON DEFICIENCY ASSOCIATED WITH NONFAMILIAL RESTLESS LEGS SYNDROME: Status: ACTIVE | Noted: 2018-11-13

## 2018-11-13 PROBLEM — K12.0 APHTHOUS ULCER: Status: ACTIVE | Noted: 2018-11-13

## 2018-11-13 PROBLEM — M51.26 DISPLACEMENT OF LUMBAR INTERVERTEBRAL DISC WITHOUT MYELOPATHY: Status: ACTIVE | Noted: 2018-11-13

## 2018-11-13 PROBLEM — N94.89 OTHER SPECIFIED SYMPTOM ASSOCIATED WITH FEMALE GENITAL ORGANS: Status: ACTIVE | Noted: 2018-11-13

## 2018-11-13 PROBLEM — M76.899 ENTHESOPATHY OF HIP REGION: Status: ACTIVE | Noted: 2018-11-13

## 2018-11-13 PROBLEM — F95.2 TOURETTE'S SYNDROME: Status: ACTIVE | Noted: 2018-11-13

## 2018-11-13 PROBLEM — R05.9 COUGH: Status: ACTIVE | Noted: 2018-11-13

## 2018-11-13 ASSESSMENT — PAIN SCALES - GENERAL: PAINLEVEL: SEVERE PAIN (6)

## 2018-11-13 NOTE — MR AVS SNAPSHOT
After Visit Summary   11/13/2018    Samara Oropeza    MRN: [id number]           Patient Information     Date Of Birth          1995        Visit Information        Provider Department      11/13/2018 2:20 PM Alejandrina Hernandez MD [facility]         Follow-ups after your visit        Follow-up notes from your care team     Return in about 12 weeks (around 2/5/2019) for Chronic Migraine.      Your next 10 appointments already scheduled     Nov 20, 2018 10:00 AM CST   Return Visit with Ernestina Patel [address] [address]   [phone]            Dec 11, 2018 12:00 PM CST   Return Visit with Ernestina Patel [address] [address]   [phone]            Dec 26, 2018 12:30 PM CST   Return Visit with Ernestina Patel [place] [address] [address]   [phone]            Feb 05, 2019  9:40 AM CST   (Arrive by 9:25 AM)   Return Botox with Alejandrina Hernandez MD   [facility] ([facility])    [address] [place] [id number] [phone]            Oct 30, 2019  1:00 PM CDT   (Arrive by 12:45 PM)   Return Multiple Sclerosis with Joseph De La Torre MD   [facility] ([facility])    [address] [place] [id number] [phone]              Who to contact     Please call your clinic at [phone] to:    Ask questions about your health    Make or cancel appointments    Discuss your medicines    Learn about your test results    Speak to your doctor            Additional  "Information About Your Visit        MyChart Information     [employer] gives you secure access to your electronic health record. If you see a primary care provider, you can also send messages to your care team and make appointments. If you have questions, please call your primary care clinic.  If you do not have a primary care provider, please call [phone] and they will assist you.      [employer] is an electronic gateway that provides easy, online access to your medical records. With [employer], you can request a clinic appointment, read your test results, renew a prescription or communicate with your care team.     To access your existing account, please contact your [facility] or call [phone] for assistance.        Care EveryWhere ID     This is your Care EveryWhere ID. This could be used by other organizations to access your [place] medical records  [id number]        Your Vitals Were     Pulse Temperature Respirations Height Pulse Oximetry Breastfeeding?    131 98.3  F (36.8  C) (Oral) 16 1.588 m (5' 2.5\") 99% No    BMI (Body Mass Index)                   27.72 kg/m2            Blood Pressure from Last 3 Encounters:   11/13/18 119/80   10/31/18 102/62   10/30/18 124/79    Weight from Last 3 Encounters:   11/13/18 69.9 kg (154 lb)   08/29/18 70 kg (154 lb 6.4 oz)   08/15/18 69.9 kg (154 lb 1.6 oz)              Today, you had the following     No orders found for display       Primary Care Provider Office Phone # Fax #    Sonja Annabella Abreu, APRN [facility] [phone]       [address] [facility] [address]  [facility] [address]        Equal Access to Services     NABIL GALEANO AH: Hadii malcolm ahumada Sotiffany, waaxda luqadaha, qaybta kaalmada mike juan. So [facility] [phone].    ATENCIÓN: Si habla español, tiene a livingston disposición servicios gratuitos de asistencia lingüística. Llame al [phone].    We comply with applicable federal civil " rights laws and [place] laws. We do not discriminate on the basis of race, color, national origin, age, disability, sex, sexual orientation, or gender identity.            Thank you!     Thank you for choosing [facility]  for your care. Our goal is always to provide you with excellent care. Hearing back from our patients is one way we can continue to improve our services. Please take a few minutes to complete the written survey that you may receive in the mail after your visit with us. Thank you!             Your Updated Medication List - Protect others around you: Learn how to safely use, store and throw away your medicines at www.disposemymeds.org.          This list is accurate as of 11/13/18  3:25 PM.  Always use your most recent med list.                   Brand Name Dispense Instructions for use Diagnosis    albuterol 108 (90 Base) MCG/ACT inhaler    PROAIR HFA/PROVENTIL HFA/VENTOLIN HFA    1 Inhaler    Inhale 2 puffs into the lungs every 6 hours as needed for shortness of breath / dyspnea or wheezing    Atypical chest pain       amitriptyline 50 MG tablet    ELAVIL    30 tablet    Take 1 tablet (50 mg) by mouth At Bedtime    Abdominal pain, generalized, Insomnia due to medical condition       * apremilast 30 MG tablet    OTEZLA    180 tablet    Take 1 tablet (30 mg) by mouth 2 times daily    Behcet's disease (H)       * Apremilast 10 & 20 & 30 MG Tbpk    OTEZLA    1 each    Take by mouth according to the instructions on the packet. Hold for signs of infection,any GI upset, weight loss or depression concerns, and seek medical attention.    Behcet's disease (H)       artificial tears Oint ophthalmic ointment     1 Tube    0.5 inch strip each eye at night    Bilateral dry eyes       ASPIRIN CHILDRENS 81 MG chewable tablet   Generic drug:  aspirin      Take 81 mg by mouth as needed        aspirin-acetaminophen-caffeine 250-250-65 MG per tablet    EXCEDRIN MIGRAINE     Take 1  tablet by mouth every 6 hours as needed for headaches        benzocaine 20 % Gel    TOPICALE XTRA    30 g    Apply as needed locally to mouth or nasal ulcers for pain; 4 times daily as needed    Behcet's disease (H)       betamethasone valerate 0.1 % cream    VALISONE     Apply topically 2 times daily        * BOTOX IJ      Inject 165 Units as directed once Lot#: /C3 Exp: 10/2020        * BOTOX IJ      Inject 165 Units as directed once Lot # /C3  Exp:  10/2020        * botulinum toxin type A 100 units injection    BOTOX    200 Units    Inject 200 Units into the muscle every 3 months    Cervical dystonia       * BOTOX IJ      Inject 165 Units into the muscle once Lot /C3 Exp 06/2020        * BOTOX IJ      Inject 175 Units into the muscle once Lot # /C3 with Expiration Date:  11/2020        * BOTOX IJ      Inject 175 Units into the muscle Lot: [id number] Exp: 01/2021        clobetasol 0.05 % cream    TEMOVATE    60 g    Apply topically 2 times daily    Folliculitis       colchicine 0.6 MG tablet    COLCYRS    180 tablet    Take 0.6 mg in AM and 0.6mg in PM every day    Behcet's disease (H)       cyproheptadine 4 MG tablet    PERIACTIN    30 tablet    Take 1 tablet (4 mg) by mouth At Bedtime For nightmares    Nightmares       diclofenac 1 % Gel topical gel    VOLTAREN    100 g    Apply 2-4 grams to affected area(s) up to 4 times per day as needed. This is an anti-inflammatory medication.    Polyarthralgia       dicyclomine 20 MG tablet    BENTYL    120 tablet    Take 1 tablet (20 mg) by mouth 4 times daily as needed    Abdominal cramping       diltiazem 2% in PLO cream (FV COMPOUNDED) 2% Gel     30 g    Apply small pea size amount three times daily to anus until pain is gone.    Anal fissure       diphenhydrAMINE 25 MG tablet    BENADRYL    30 tablet    Take 1 tablet (25 mg) by mouth every 8 hours as needed for allergies        EPINEPHrine 0.3 MG/0.3ML injection 2-pack    EPIPEN 2-EAMON    0.6 mL     Inject 0.3 mLs (0.3 mg) into the muscle as needed for anaphylaxis    Hx of bee sting allergy       guanFACINE 1 MG tablet    TENEX    180 tablet    Take 3 tablets (3 mg) by mouth twice daily in the morning and evening.    Tic       hydrocortisone 1 % Crea cream     28.35 g    Place rectally 2 times daily as needed for itching    Hemorrhoids, unspecified hemorrhoid type       hydrOXYzine 25 MG tablet    ATARAX    90 tablet    Take 1-2 tablets (25-50 mg) by mouth every 6 hours as needed for anxiety    TAHIR (generalized anxiety disorder)       hyoscyamine 0.125 MG tablet    ANASPAZ/LEVSIN    40 tablet    Take 1-2 tablets (125-250 mcg) by mouth every 4 hours as needed for cramping    Abdominal pain, generalized       hypromellose 0.3 % Soln ophthalmic solution    GENTEAL     1 drop every hour as needed for dry eyes        LACTAID PO      Take by mouth daily        lactulose 20 GM/30ML Soln     300 mL    Take 30 mLs by mouth 3 times daily as needed    Constipation, unspecified constipation type       lidocaine 2 % topical gel    XYLOCAINE     Apply 1 Tube topically daily Reported on 4/21/2017        lidocaine visc 2% 2.5mL/5mL & maalox/mylanta w/ simeth 2.5mL/5mL & diphenhydrAMINE 5mg/5mL Susp suspension    [facility] [place] [facility]\""    1 Bottle    Swish and swallow 10 mLs in mouth every 6 hours as needed for mouth sores    Behcet's syndrome (H)       linaclotide 145 MCG capsule    LINZESS    90 capsule    Take 1 capsule (145 mcg) by mouth every morning (before breakfast)    Chronic idiopathic constipation       Magnesium Aspartate HCl 1230 MG Pack     30 each    Take 1 packet by mouth daily as needed    Irritable bowel syndrome with constipation       norgestimate-ethinyl estradiol 0.25-35 MG-MCG per tablet    SPRINTEC 28    84 tablet    Take 1 tablet by mouth daily    General counseling for prescription of oral contraceptives       omeprazole 40 MG capsule    priLOSEC    90 capsule    Take 1 capsule (40 mg) by mouth daily     Gastroesophageal reflux disease, esophagitis presence not specified       ondansetron 8 MG tablet    ZOFRAN    60 tablet    Take 1 tablet (8 mg) by mouth every 8 hours as needed for nausea    Nausea       phenazopyridine HCl tablet    AZO URINARY PAIN RELIEF    24 tablet    Take 2 tablets (190 mg) by mouth 3 times daily    Dysuria       predniSONE 20 MG tablet    DELTASONE    10 tablet    Take 1 tablet (20 mg) by mouth 2 times daily    Urticaria       sucralfate 1 GM/10ML suspension    CARAFATE    1200 mL    Take 10 mLs (1 g) by mouth 4 times daily    Bile reflux gastritis, Nausea       sulfamethoxazole-trimethoprim 800-160 MG per tablet    BACTRIM DS/SEPTRA DS    28 tablet    Take 1 tablet by mouth 2 times daily for 14 days    Acute pyelonephritis       triamcinolone 0.1 % cream    KENALOG    80 g    Apply sparingly to lesions twice daily as needed    Behcet's syndrome (H)       * Notice:  This list has 8 medication(s) that are the same as other medications prescribed for you. Read the directions carefully, and ask your doctor or other care provider to review them with you.

## 2018-11-13 NOTE — LETTER
"11/13/2018       RE: Samara Oropeza  [address]  [address]  [place] [address]     Dear Colleague,    Thank you for referring your patient, Samara Oropeza, to the [facility] PHYSICAL MEDICINE AND REHABILITATION at [facility]. Please see a copy of my visit note below.    BOTULINUM TOXIN PROCEDURE - HEADACHE - NOTE    Chief Complaint   Patient presents with     Dystonia     UMP RETURN BOTOX CERVICAL DYSTONIA       /80  Pulse 131  Temp 98.3  F (36.8  C) (Oral)  Resp 16  Ht 1.588 m (5' 2.5\")  Wt 69.9 kg (154 lb)  SpO2 99%  Breastfeeding? No  BMI 27.72 kg/m2       Current Outpatient Prescriptions:      albuterol (PROAIR HFA/PROVENTIL HFA/VENTOLIN HFA) 108 (90 BASE) MCG/ACT Inhaler, Inhale 2 puffs into the lungs every 6 hours as needed for shortness of breath / dyspnea or wheezing, Disp: 1 Inhaler, Rfl: 1     amitriptyline (ELAVIL) 50 MG tablet, Take 1 tablet (50 mg) by mouth At Bedtime, Disp: 30 tablet, Rfl: 11     Apremilast (OTEZLA) 10 & 20 & 30 MG TBPK, Take by mouth according to the instructions on the packet. Hold for signs of infection,any GI upset, weight loss or depression concerns, and seek medical attention., Disp: 1 each, Rfl: 0     artificial tears OINT ophthalmic ointment, 0.5 inch strip each eye at night, Disp: 1 Tube, Rfl: 11     aspirin (ASPIRIN CHILDRENS) 81 MG chewable tablet, Take 81 mg by mouth as needed , Disp: , Rfl:      aspirin-acetaminophen-caffeine (EXCEDRIN MIGRAINE) 250-250-65 MG per tablet, Take 1 tablet by mouth every 6 hours as needed for headaches, Disp: , Rfl:      benzocaine (TOPICALE XTRA) 20 % GEL, Apply as needed locally to mouth or nasal ulcers for pain; 4 times daily as needed, Disp: 30 g, Rfl: 1     betamethasone valerate (VALISONE) 0.1 % cream, Apply topically 2 times daily, Disp: , Rfl:      botulinum toxin type A (BOTOX) 100 UNITS injection, Inject 200 Units into the muscle every 3 months, Disp: 200 Units, Rfl: " 3     clobetasol (TEMOVATE) 0.05 % cream, Apply topically 2 times daily, Disp: 60 g, Rfl: 0     colchicine (COLCYRS) 0.6 MG tablet, Take 0.6 mg in AM and 0.6mg in PM every day, Disp: 180 tablet, Rfl: 0     cyproheptadine (PERIACTIN) 4 MG tablet, Take 1 tablet (4 mg) by mouth At Bedtime For nightmares, Disp: 30 tablet, Rfl: 2     diclofenac (VOLTAREN) 1 % GEL topical gel, Apply 2-4 grams to affected area(s) up to 4 times per day as needed. This is an anti-inflammatory medication., Disp: 100 g, Rfl: 4     dicyclomine (BENTYL) 20 MG tablet, Take 1 tablet (20 mg) by mouth 4 times daily as needed, Disp: 120 tablet, Rfl: 3     diltiazem 2% in PLO cream, FV COMPOUNDED, 2% GEL, Apply small pea size amount three times daily to anus until pain is gone., Disp: 30 g, Rfl: 1     diphenhydrAMINE (BENADRYL) 25 MG tablet, Take 1 tablet (25 mg) by mouth every 8 hours as needed for allergies, Disp: 30 tablet, Rfl: 0     EPINEPHrine (EPIPEN 2-EAMON) 0.3 MG/0.3ML injection, Inject 0.3 mLs (0.3 mg) into the muscle as needed for anaphylaxis, Disp: 0.6 mL, Rfl: 3     guanFACINE (TENEX) 1 MG tablet, Take 3 tablets (3 mg) by mouth twice daily in the morning and evening., Disp: 180 tablet, Rfl: 1     hydrocortisone 1 % CREA cream, Place rectally 2 times daily as needed for itching, Disp: 28.35 g, Rfl: 1     hydrOXYzine (ATARAX) 25 MG tablet, Take 1-2 tablets (25-50 mg) by mouth every 6 hours as needed for anxiety, Disp: 90 tablet, Rfl: 2     hypromellose (GENTEAL) 0.3 % SOLN, 1 drop every hour as needed for dry eyes , Disp: , Rfl:      Lactase (LACTAID PO), Take by mouth daily, Disp: , Rfl:      lactulose 20 GM/30ML SOLN, Take 30 mLs by mouth 3 times daily as needed, Disp: 300 mL, Rfl: 3     lidocaine (XYLOCAINE) 2 % jelly, Apply 1 Tube topically daily Reported on 4/21/2017, Disp: , Rfl:      linaclotide (LINZESS) 145 MCG capsule, Take 1 capsule (145 mcg) by mouth every morning (before breakfast), Disp: 90 capsule, Rfl: 1     Magic Mouthwash  (FV std formula) lidocaine visc 2% 2.5mL/5mL & maalox/mylanta w/ simeth 2.5mL/5mL & diphenhydrAMINE 5mg/5mL, Swish and swallow 10 mLs in mouth every 6 hours as needed for mouth sores, Disp: 1 Bottle, Rfl: 1     norgestimate-ethinyl estradiol (SPRINTEC 28) 0.25-35 MG-MCG per tablet, Take 1 tablet by mouth daily, Disp: 84 tablet, Rfl: 4     omeprazole (PRILOSEC) 40 MG capsule, Take 1 capsule (40 mg) by mouth daily, Disp: 90 capsule, Rfl: 3     OnabotulinumtoxinA (BOTOX IJ), Inject 175 Units into the muscle Lot: [id number] Exp: 01/2021, Disp: , Rfl:      OnabotulinumtoxinA (BOTOX IJ), Inject 175 Units into the muscle once Lot # /C3 with Expiration Date:  11/2020, Disp: , Rfl:      OnabotulinumtoxinA (BOTOX IJ), Inject 165 Units as directed once Lot#: /C3 Exp: 10/2020, Disp: , Rfl:      OnabotulinumtoxinA (BOTOX IJ), Inject 165 Units as directed once Lot # /C3  Exp:  10/2020, Disp: , Rfl:      OnabotulinumtoxinA (BOTOX IJ), Inject 165 Units into the muscle once Lot /C3 Exp 06/2020, Disp: , Rfl:      ondansetron (ZOFRAN) 8 MG tablet, Take 1 tablet (8 mg) by mouth every 8 hours as needed for nausea, Disp: 60 tablet, Rfl: 1     phenazopyridine HCl (AZO URINARY PAIN RELIEF) tablet, Take 2 tablets (190 mg) by mouth 3 times daily, Disp: 24 tablet, Rfl: 1     sucralfate (CARAFATE) 1 GM/10ML suspension, Take 10 mLs (1 g) by mouth 4 times daily, Disp: 1200 mL, Rfl: 2     sulfamethoxazole-trimethoprim (BACTRIM DS/SEPTRA DS) 800-160 MG per tablet, Take 1 tablet by mouth 2 times daily for 14 days, Disp: 28 tablet, Rfl: 0     triamcinolone (KENALOG) 0.1 % cream, Apply sparingly to lesions twice daily as needed, Disp: 80 g, Rfl: 1     apremilast (OTEZLA) 30 MG tablet, Take 1 tablet (30 mg) by mouth 2 times daily (Patient not taking: Reported on 10/31/2018), Disp: 180 tablet, Rfl: 0     hyoscyamine (ANASPAZ/LEVSIN) 0.125 MG tablet, Take 1-2 tablets (125-250 mcg) by mouth every 4 hours as needed for cramping (Patient  not taking: Reported on 10/31/2018), Disp: 40 tablet, Rfl: 1     Magnesium Aspartate HCl 1230 MG PACK, Take 1 packet by mouth daily as needed (Patient not taking: Reported on 10/31/2018), Disp: 30 each, Rfl: 3     predniSONE (DELTASONE) 20 MG tablet, Take 1 tablet (20 mg) by mouth 2 times daily (Patient not taking: Reported on 10/31/2018), Disp: 10 tablet, Rfl: 0     Allergies   Allergen Reactions     Amoxil [Penicillins] Rash     Dad unsure of reaction.     Bee Venom Anaphylaxis     Contrast Dye Rash     Contrast Media Ready-Box [facility] [place], 04/09/2014.; Contrast Media Ready-Box [facility] [place], 04/09/2014.  NOTE: this is a contrast media oral with iodine. Premedicate with methylpred standard for IV contrast, request barium contrast for oral contrast.     Kiwi Swelling     Orange Fruit [Citrus] Anaphylaxis     Pineapple Anaphylaxis, Difficulty breathing and Rash     Reglan [Metoclopramide] Other (See Comments)     IV dose only, in ER, rapid heart rate.     Ace Inhibitors      Difficulty in breathing and GI upset     Amitiza [Lubiprostone] Nausea and Vomiting     Amoxicillin-Pot Clavulanate      Latex      Midazolam Unknown     Other reaction(s): Unknown  parent states that when pt takes this medication, she wakes up being very violent .  parent states that when pt takes this medication, she wakes up being very violent .     No Clinical Screening - See Comments      Bleech/ chest tightness, itchy throat, swollen tongue, hives     Versed      Coming out of pelvic exam at age of 5, was kicking and screaming when coming out of the versed.     Adhesive Tape Rash     Azithromycin Hives and Rash     Cephalexin Itching and Rash     Itchy mouth  Itchy mouth     Keflex [Cephalexin-Fd&C Yellow #6] Hives        PHYSICAL EXAM:    Currently has a 3/10 headache  No facial asymmetry    HPI:    Patient reports the following new medical problems since last visit: Has been on antibiotics for sinus infections and urinary tract infections several times  over the last few months. Was on Bactrim and then later switched to Nitrofurantoin.  Samara also turned her head into the corner of a wall about a week ago and sustained a concussion. After that, she had nausea and vomiting. She was evaluated at [facility] that day, and did not experience any further sequelae.     We reviewed the recommended safety guidelines for  Botox from any vaccine injection, such as the seasonal flu vaccine, by a minimum of 10-14 days with Samara Oropeza. She acknowledged understanding.    RESPONSE TO PREVIOUS TREATMENT:  Change in headache pattern following last series of injections with 175 units on 8/29/18.     Post-procedural headache: Rated as 'Moderate' severity.  Duration: 2-3 weeks followed by gradual resolution.    1.  Headache frequency during this injection cycle: 10 headache days per month, which she believes is correlated with being sick on multiple occasions during this injection cycle.  This is compared to her baseline headache frequency of 20 headache days per month.     2.  Headache duration during this injection cycle:  Headache duration ranged from 24 hours to 3 days. Patient reports 2 episodes of multiple day headaches during this injection cycle.     3.  Headache intensity during this injection cycle:    A.  7/10  =  Typical pain level.  B.  9/10  =  Worst pain level.  C.  0/10  =  Lowest pain level.    4.  Change in headache medication usage during this injection cycle:  Has not taken any other medication for her headaches.    5.  ER Visits During This Injection Cycle:  None.    6.  Functional Performance:  Change in ADL's, social interaction, days lost from work, etc. Patient reports being able to more fully participate in social and family activities and responsibilities as headache symptoms have improved after the procedure which is typical for her.      BOTULINUM NEUROTOXIN INJECTION PROCEDURES:      VERIFICATION OF PATIENT IDENTIFICATION AND  PROCEDURE     Initials   Patient Name ses   Patient  ses   Procedure Verified by: kirill     Prior to the start of the procedure and with procedural staff participation, I verbally confirmed the patient s identity using two indicators, relevant allergies, that the procedure was appropriate and matched the consent or emergent situation, and that the correct equipment/implants were available. Immediately prior to starting the procedure I conducted the Time Out with the procedural staff and re-confirmed the patient s name, procedure, and site/side. (The Joint Commission universal protocol was followed.)  Yes    Sedation (Moderate or Deep): None    Above assessments performed by:    Alejandrina Hernandez MD      INDICATIONS FOR PROCEDURES:  Samara Oropeza is a 23-year-old patient with a history of Tourette's syndrome, spasmodic torticollis, chronic neck pain, and chronic migraine headaches associated with cervicogenic components.     Her baseline symptoms have been recalcitrant to oral medications and conservative therapy.  She is here today for reinjection with Botox.    GOAL OF PROCEDURE:  The goal of this procedure is to increase active range of motion, improve volitional motor control, decrease pain  and enhance functional independence.    TOTAL DOSE ADMINISTERED:  Dose Administered:  175 units  Botox (Botulinum Toxin Type A)       2:1 Dilution   Diluent Used:  0.5% Sensorcaine (switched from 0.25% due to shortage)   Lot: /C3  with Expiration Date: 2021  NDC #: Botox 100u ([address]-18)    Total Dose = 200 units Botox  Unavoidable waste = 25 units Botox    Medication guide was offered to patient and was declined.    CONSENT:  The risks, benefits, and treatment options were discussed with Samara Oropeza and she agreed to proceed.    Written consent was obtained by [facility].     EQUIPMENT USED:  Needle-37mm stimulating/recording  Needle-30 gauge  EMG/NCS Machine    SKIN PREPARATION:  Skin preparation was  performed using an alcohol wipe.    GUIDANCE DESCRIPTION:  Electro-myographic guidance was necessary throughout the procedure to accurately identify all areas of spastic muscles while avoiding injection of non-spastic muscles, neighboring nerves and nearby vascular structures.     AREA/MUSCLE INJECTED:  175 UNITS BOTOX = TOTAL DOSE, DILUTION 2:1 NS and 0.25% Sensorcaine     1 & 2. SHOULDER GIRDLE & NECK MUSCLES: 20 units Botox = Total Dose, 2:1 Dilution   Right Splenius - 5 units of Botox at 1 site/s.   Left Splenius - 5 units of Botox at 1 site/s.      Right Levator Scapulae - 5 units of Botox at 1 site/s (shoulder muscles).   Left Levator Scapulae - 5 units of Botox at 1 site/s (shoulder muscles).     3. HEAD & SCALP MUSCLES: 155 units Botox = Total Dose, 2:1 Dilution  Right Occipitalis - 10 units of Botox at 3 site/s.   Left Occipitalis - 10 units of Botox at 3 site/s.     Right Frontalis - 15 units of Botox at 4 site/s.  Left Frontalis - 15 units of Botox at 4 site/s.     Right Temporalis - 45 units of Botox at 8 site/s.  Left Temporalis - 45 units of Botox at 8 site/s.     Right  - 5 units of Botox at 1 site/s.              Left  - 5 units of Botox at 1 site/s.      Procerus - 5 units of Botox at 1 site/s.      RESPONSE TO PROCEDURE:  Samara Oropeza tolerated the procedure well and there were no immediate complications.  She was allowed to recover for an appropriate period of time and was discharged home in stable condition.    FOLLOW UP:  Samara Oropeza was asked to follow up by phone in 7-14 days with Marcelle Richardson PT, Care Coordinator or Vidya Dickinson RN, Care Coordinator, to report her response to this series of injections.  Based on the patient's previous response to this therapy, Samara Oropeza was rescheduled for the next series of injections in 12 weeks.    PLAN (Medication Changes, Therapy Orders, Work or Disability Issues, etc.): Patient will continue to  monitor response to today's injections.      Again, thank you for allowing me to participate in the care of your patient.      Sincerely,    Alejandrina Hernandez MD

## 2018-11-13 NOTE — PROGRESS NOTES
"BOTULINUM TOXIN PROCEDURE - HEADACHE - NOTE    Chief Complaint   Patient presents with     Dystonia     [facility] RETURN BOTOX CERVICAL DYSTONIA       /80  Pulse 131  Temp 98.3  F (36.8  C) (Oral)  Resp 16  Ht 1.588 m (5' 2.5\")  Wt 69.9 kg (154 lb)  SpO2 99%  Breastfeeding? No  BMI 27.72 kg/m2       Current Outpatient Prescriptions:      albuterol (PROAIR HFA/PROVENTIL HFA/VENTOLIN HFA) 108 (90 BASE) MCG/ACT Inhaler, Inhale 2 puffs into the lungs every 6 hours as needed for shortness of breath / dyspnea or wheezing, Disp: 1 Inhaler, Rfl: 1     amitriptyline (ELAVIL) 50 MG tablet, Take 1 tablet (50 mg) by mouth At Bedtime, Disp: 30 tablet, Rfl: 11     Apremilast (OTEZLA) 10 & 20 & 30 MG TBPK, Take by mouth according to the instructions on the packet. Hold for signs of infection,any GI upset, weight loss or depression concerns, and seek medical attention., Disp: 1 each, Rfl: 0     artificial tears OINT ophthalmic ointment, 0.5 inch strip each eye at night, Disp: 1 Tube, Rfl: 11     aspirin (ASPIRIN CHILDRENS) 81 MG chewable tablet, Take 81 mg by mouth as needed , Disp: , Rfl:      aspirin-acetaminophen-caffeine (EXCEDRIN MIGRAINE) 250-250-65 MG per tablet, Take 1 tablet by mouth every 6 hours as needed for headaches, Disp: , Rfl:      benzocaine (TOPICALE XTRA) 20 % GEL, Apply as needed locally to mouth or nasal ulcers for pain; 4 times daily as needed, Disp: 30 g, Rfl: 1     betamethasone valerate (VALISONE) 0.1 % cream, Apply topically 2 times daily, Disp: , Rfl:      botulinum toxin type A (BOTOX) 100 UNITS injection, Inject 200 Units into the muscle every 3 months, Disp: 200 Units, Rfl: 3     clobetasol (TEMOVATE) 0.05 % cream, Apply topically 2 times daily, Disp: 60 g, Rfl: 0     colchicine (COLCYRS) 0.6 MG tablet, Take 0.6 mg in AM and 0.6mg in PM every day, Disp: 180 tablet, Rfl: 0     cyproheptadine (PERIACTIN) 4 MG tablet, Take 1 tablet (4 mg) by mouth At Bedtime For nightmares, Disp: 30 tablet, Rfl: " 2     diclofenac (VOLTAREN) 1 % GEL topical gel, Apply 2-4 grams to affected area(s) up to 4 times per day as needed. This is an anti-inflammatory medication., Disp: 100 g, Rfl: 4     dicyclomine (BENTYL) 20 MG tablet, Take 1 tablet (20 mg) by mouth 4 times daily as needed, Disp: 120 tablet, Rfl: 3     diltiazem 2% in PLO cream, FV COMPOUNDED, 2% GEL, Apply small pea size amount three times daily to anus until pain is gone., Disp: 30 g, Rfl: 1     diphenhydrAMINE (BENADRYL) 25 MG tablet, Take 1 tablet (25 mg) by mouth every 8 hours as needed for allergies, Disp: 30 tablet, Rfl: 0     EPINEPHrine (EPIPEN 2-EAMON) 0.3 MG/0.3ML injection, Inject 0.3 mLs (0.3 mg) into the muscle as needed for anaphylaxis, Disp: 0.6 mL, Rfl: 3     guanFACINE (TENEX) 1 MG tablet, Take 3 tablets (3 mg) by mouth twice daily in the morning and evening., Disp: 180 tablet, Rfl: 1     hydrocortisone 1 % CREA cream, Place rectally 2 times daily as needed for itching, Disp: 28.35 g, Rfl: 1     hydrOXYzine (ATARAX) 25 MG tablet, Take 1-2 tablets (25-50 mg) by mouth every 6 hours as needed for anxiety, Disp: 90 tablet, Rfl: 2     hypromellose (GENTEAL) 0.3 % SOLN, 1 drop every hour as needed for dry eyes , Disp: , Rfl:      Lactase (LACTAID PO), Take by mouth daily, Disp: , Rfl:      lactulose 20 GM/30ML SOLN, Take 30 mLs by mouth 3 times daily as needed, Disp: 300 mL, Rfl: 3     lidocaine (XYLOCAINE) 2 % jelly, Apply 1 Tube topically daily Reported on 4/21/2017, Disp: , Rfl:      linaclotide (LINZESS) 145 MCG capsule, Take 1 capsule (145 mcg) by mouth every morning (before breakfast), Disp: 90 capsule, Rfl: 1     Magic Mouthwash (FV std formula) lidocaine visc 2% 2.5mL/5mL & maalox/mylanta w/ simeth 2.5mL/5mL & diphenhydrAMINE 5mg/5mL, Swish and swallow 10 mLs in mouth every 6 hours as needed for mouth sores, Disp: 1 Bottle, Rfl: 1     norgestimate-ethinyl estradiol (SPRINTEC 28) 0.25-35 MG-MCG per tablet, Take 1 tablet by mouth daily, Disp: 84  tablet, Rfl: 4     omeprazole (PRILOSEC) 40 MG capsule, Take 1 capsule (40 mg) by mouth daily, Disp: 90 capsule, Rfl: 3     OnabotulinumtoxinA (BOTOX IJ), Inject 175 Units into the muscle Lot: [id number] Exp: 01/2021, Disp: , Rfl:      OnabotulinumtoxinA (BOTOX IJ), Inject 175 Units into the muscle once Lot # /C3 with Expiration Date:  11/2020, Disp: , Rfl:      OnabotulinumtoxinA (BOTOX IJ), Inject 165 Units as directed once Lot#: /C3 Exp: 10/2020, Disp: , Rfl:      OnabotulinumtoxinA (BOTOX IJ), Inject 165 Units as directed once Lot # /C3  Exp:  10/2020, Disp: , Rfl:      OnabotulinumtoxinA (BOTOX IJ), Inject 165 Units into the muscle once Lot /C3 Exp 06/2020, Disp: , Rfl:      ondansetron (ZOFRAN) 8 MG tablet, Take 1 tablet (8 mg) by mouth every 8 hours as needed for nausea, Disp: 60 tablet, Rfl: 1     phenazopyridine HCl (AZO URINARY PAIN RELIEF) tablet, Take 2 tablets (190 mg) by mouth 3 times daily, Disp: 24 tablet, Rfl: 1     sucralfate (CARAFATE) 1 GM/10ML suspension, Take 10 mLs (1 g) by mouth 4 times daily, Disp: 1200 mL, Rfl: 2     sulfamethoxazole-trimethoprim (BACTRIM DS/SEPTRA DS) 800-160 MG per tablet, Take 1 tablet by mouth 2 times daily for 14 days, Disp: 28 tablet, Rfl: 0     triamcinolone (KENALOG) 0.1 % cream, Apply sparingly to lesions twice daily as needed, Disp: 80 g, Rfl: 1     apremilast (OTEZLA) 30 MG tablet, Take 1 tablet (30 mg) by mouth 2 times daily (Patient not taking: Reported on 10/31/2018), Disp: 180 tablet, Rfl: 0     hyoscyamine (ANASPAZ/LEVSIN) 0.125 MG tablet, Take 1-2 tablets (125-250 mcg) by mouth every 4 hours as needed for cramping (Patient not taking: Reported on 10/31/2018), Disp: 40 tablet, Rfl: 1     Magnesium Aspartate HCl 1230 MG PACK, Take 1 packet by mouth daily as needed (Patient not taking: Reported on 10/31/2018), Disp: 30 each, Rfl: 3     predniSONE (DELTASONE) 20 MG tablet, Take 1 tablet (20 mg) by mouth 2 times daily (Patient not taking:  Reported on 10/31/2018), Disp: 10 tablet, Rfl: 0     Allergies   Allergen Reactions     Amoxil [Penicillins] Rash     Dad unsure of reaction.     Bee Venom Anaphylaxis     Contrast Dye Rash     Contrast Media Ready-Box [facility] [place], 04/09/2014.; Contrast Media Ready-Box [facility] [place], 04/09/2014.  NOTE: this is a contrast media oral with iodine. Premedicate with methylpred standard for IV contrast, request barium contrast for oral contrast.     Kiwi Swelling     Orange Fruit [Citrus] Anaphylaxis     Pineapple Anaphylaxis, Difficulty breathing and Rash     Reglan [Metoclopramide] Other (See Comments)     IV dose only, in ER, rapid heart rate.     Ace Inhibitors      Difficulty in breathing and GI upset     Amitiza [Lubiprostone] Nausea and Vomiting     Amoxicillin-Pot Clavulanate      Latex      Midazolam Unknown     Other reaction(s): Unknown  parent states that when pt takes this medication, she wakes up being very violent .  parent states that when pt takes this medication, she wakes up being very violent .     No Clinical Screening - See Comments      Bleech/ chest tightness, itchy throat, swollen tongue, hives     Versed      Coming out of pelvic exam at age of 5, was kicking and screaming when coming out of the versed.     Adhesive Tape Rash     Azithromycin Hives and Rash     Cephalexin Itching and Rash     Itchy mouth  Itchy mouth     Keflex [Cephalexin-Fd&C Yellow #6] Hives        PHYSICAL EXAM:    Currently has a 3/10 headache  No facial asymmetry    HPI:    Patient reports the following new medical problems since last visit: Has been on antibiotics for sinus infections and urinary tract infections several times over the last few months. Was on Bactrim and then later switched to Nitrofurantoin.  Samara also turned her head into the corner of a wall about a week ago and sustained a concussion. After that, she had nausea and vomiting. She was evaluated at [facility] that day, and did not experience any further  sequelae.     We reviewed the recommended safety guidelines for  Botox from any vaccine injection, such as the seasonal flu vaccine, by a minimum of 10-14 days with Samara Oropeza. She acknowledged understanding.    RESPONSE TO PREVIOUS TREATMENT:  Change in headache pattern following last series of injections with 175 units on 18.     Post-procedural headache: Rated as 'Moderate' severity.  Duration: 2-3 weeks followed by gradual resolution.    1.  Headache frequency during this injection cycle: 10 headache days per month, which she believes is correlated with being sick on multiple occasions during this injection cycle.  This is compared to her baseline headache frequency of 20 headache days per month.     2.  Headache duration during this injection cycle:  Headache duration ranged from 24 hours to 3 days. Patient reports 2 episodes of multiple day headaches during this injection cycle.     3.  Headache intensity during this injection cycle:    A.  7/10  =  Typical pain level.  B.  9/10  =  Worst pain level.  C.  0/10  =  Lowest pain level.    4.  Change in headache medication usage during this injection cycle:  Has not taken any other medication for her headaches.    5.  ER Visits During This Injection Cycle:  None.    6.  Functional Performance:  Change in ADL's, social interaction, days lost from work, etc. Patient reports being able to more fully participate in social and family activities and responsibilities as headache symptoms have improved after the procedure which is typical for her.      BOTULINUM NEUROTOXIN INJECTION PROCEDURES:      VERIFICATION OF PATIENT IDENTIFICATION AND PROCEDURE     Initials   Patient Name ses   Patient  ses   Procedure Verified by: ses     Prior to the start of the procedure and with procedural staff participation, I verbally confirmed the patient s identity using two indicators, relevant allergies, that the procedure was appropriate and matched the  consent or emergent situation, and that the correct equipment/implants were available. Immediately prior to starting the procedure I conducted the Time Out with the procedural staff and re-confirmed the patient s name, procedure, and site/side. (The Joint Commission universal protocol was followed.)  Yes    Sedation (Moderate or Deep): None    Above assessments performed by:    Alejandrina Hernandez MD      INDICATIONS FOR PROCEDURES:  Samara Oropeza is a 23-year-old patient with a history of Tourette's syndrome, spasmodic torticollis, chronic neck pain, and chronic migraine headaches associated with cervicogenic components.     Her baseline symptoms have been recalcitrant to oral medications and conservative therapy.  She is here today for reinjection with Botox.    GOAL OF PROCEDURE:  The goal of this procedure is to increase active range of motion, improve volitional motor control, decrease pain  and enhance functional independence.    TOTAL DOSE ADMINISTERED:  Dose Administered:  175 units  Botox (Botulinum Toxin Type A)       2:1 Dilution   Diluent Used:  0.5% Sensorcaine (switched from 0.25% due to shortage)   Lot: /C3  with Expiration Date: 05/2021  NDC #: Botox 100u ([address]-30)    Total Dose = 200 units Botox  Unavoidable waste = 25 units Botox    Medication guide was offered to patient and was declined.    CONSENT:  The risks, benefits, and treatment options were discussed with Samara Oropeza and she agreed to proceed.    Written consent was obtained by [facility].     EQUIPMENT USED:  Needle-37mm stimulating/recording  Needle-30 gauge  EMG/NCS Machine    SKIN PREPARATION:  Skin preparation was performed using an alcohol wipe.    GUIDANCE DESCRIPTION:  Electro-myographic guidance was necessary throughout the procedure to accurately identify all areas of spastic muscles while avoiding injection of non-spastic muscles, neighboring nerves and nearby vascular structures.     AREA/MUSCLE INJECTED:  175  UNITS BOTOX = TOTAL DOSE, DILUTION 2:1 NS and 0.25% Sensorcaine     1 & 2. SHOULDER GIRDLE & NECK MUSCLES: 20 units Botox = Total Dose, 2:1 Dilution   Right Splenius - 5 units of Botox at 1 site/s.   Left Splenius - 5 units of Botox at 1 site/s.      Right Levator Scapulae - 5 units of Botox at 1 site/s (shoulder muscles).   Left Levator Scapulae - 5 units of Botox at 1 site/s (shoulder muscles).     3. HEAD & SCALP MUSCLES: 155 units Botox = Total Dose, 2:1 Dilution  Right Occipitalis - 10 units of Botox at 3 site/s.   Left Occipitalis - 10 units of Botox at 3 site/s.     Right Frontalis - 15 units of Botox at 4 site/s.  Left Frontalis - 15 units of Botox at 4 site/s.     Right Temporalis - 45 units of Botox at 8 site/s.  Left Temporalis - 45 units of Botox at 8 site/s.     Right  - 5 units of Botox at 1 site/s.              Left  - 5 units of Botox at 1 site/s.      Procerus - 5 units of Botox at 1 site/s.      RESPONSE TO PROCEDURE:  Samara Oropeza tolerated the procedure well and there were no immediate complications.  She was allowed to recover for an appropriate period of time and was discharged home in stable condition.    FOLLOW UP:  Samara Oropeza was asked to follow up by phone in 7-14 days with Marcelle Richardson PT, Care Coordinator or Vidya Dickinson RN, Care Coordinator, to report her response to this series of injections.  Based on the patient's previous response to this therapy, Samara Oropeza was rescheduled for the next series of injections in 12 weeks.    PLAN (Medication Changes, Therapy Orders, Work or Disability Issues, etc.): Patient will continue to monitor response to today's injections.

## 2018-11-13 NOTE — NURSING NOTE
Chief Complaint   Patient presents with     Dystonia     [facility] RETURN BOTOX CERVICAL DYSTONIA     Shaquille Redman, EMT

## 2018-12-03 ENCOUNTER — HOSPITAL ENCOUNTER (EMERGENCY)
Facility: CLINIC | Age: 23
Discharge: HOME | End: 2018-12-03
Attending: EMERGENCY MEDICINE | Admitting: EMERGENCY MEDICINE
Payer: COMMERCIAL

## 2018-12-03 ENCOUNTER — APPOINTMENT (OUTPATIENT)
Dept: ULTRASOUND IMAGING | Facility: CLINIC | Age: 23
End: 2018-12-03
Attending: EMERGENCY MEDICINE
Payer: COMMERCIAL

## 2018-12-03 ENCOUNTER — MYC MEDICAL ADVICE (OUTPATIENT)
Dept: FAMILY MEDICINE | Facility: CLINIC | Age: 23
End: 2018-12-03

## 2018-12-03 VITALS
SYSTOLIC BLOOD PRESSURE: 115 MMHG | BODY MASS INDEX: 27.75 KG/M2 | HEART RATE: 96 BPM | DIASTOLIC BLOOD PRESSURE: 78 MMHG | WEIGHT: 154.2 LBS | TEMPERATURE: 97.6 F | OXYGEN SATURATION: 100 %

## 2018-12-03 DIAGNOSIS — M79.662 BILATERAL CALF PAIN: ICD-10-CM

## 2018-12-03 DIAGNOSIS — S80.12XA CONTUSION OF LEFT LOWER EXTREMITY, INITIAL ENCOUNTER: ICD-10-CM

## 2018-12-03 DIAGNOSIS — M79.661 BILATERAL CALF PAIN: ICD-10-CM

## 2018-12-03 LAB — D DIMER PPP FEU-MCNC: 0.8 UG/ML FEU (ref 0–0.5)

## 2018-12-03 PROCEDURE — 93971 EXTREMITY STUDY: CPT | Mod: LT

## 2018-12-03 PROCEDURE — 99284 EMERGENCY DEPT VISIT MOD MDM: CPT | Mod: Z6 | Performed by: EMERGENCY MEDICINE

## 2018-12-03 PROCEDURE — 99284 EMERGENCY DEPT VISIT MOD MDM: CPT | Mod: 25

## 2018-12-03 PROCEDURE — 85379 FIBRIN DEGRADATION QUANT: CPT | Performed by: EMERGENCY MEDICINE

## 2018-12-03 RX ORDER — SENNOSIDES 8.6 MG
1 TABLET ORAL DAILY
Status: ON HOLD | COMMUNITY
End: 2019-03-15

## 2018-12-03 ASSESSMENT — ENCOUNTER SYMPTOMS: COLOR CHANGE: 1

## 2018-12-03 NOTE — ED AVS SNAPSHOT
[facility], Emergency Department    [address] [facility] [address]    Phone:  [phone]                                       Samara Oropeza   MRN: [id number]    Department:  [facility], Emergency Department   Date of Visit:  12/3/2018           Patient Information     Date Of Birth          1995        Your diagnoses for this visit were:     Contusion of left lower extremity, initial encounter        You were seen by Samir iDng DO.        Discharge Instructions         Follow-up with your primary care provider.  Return the emergency department for any new or worsening symptoms.    Bruises (Contusions)    A contusion is a bruise. A bruise happens when a blow to your body doesn't break the skin but does break blood vessels beneath the skin. Blood leaking from the broken vessels causes redness and swelling. As it heals, your bruise is likely to turn colors like purple, green, and yellow. This is normal. The bruise should fade in 2 or 3 weeks.  Factors that make you more likely to bruise  Almost everyone bruises now and then. Certain people do bruise more easily than others. You're more prone to bruising as you get older. That's because blood vessels become more fragile with age. You're also more likely to bruise if you have a clotting disorder such as hemophilia or take medicines that reduce clotting, including aspirin and coumadin.  When to go to the emergency room (ER)  Bruises almost always heal on their own without special treatment. But for some people, a bad bruise can be serious. Seek medical care if you:    Have a clotting disorder such as hemophilia    Have cirrhosis or other serious liver disease    Take blood-thinning medicines such as warfarin  What to expect in the ER  A doctor will examine your bruise and ask about any health conditions you have. In some cases, you may have a test to check how well your blood clots. Other treatment will depend on your  needs.  Follow-up care  Sometimes a bruise gets worse instead of better. It may become larger and more swollen. This can occur when your body walls off a small pool of blood under the skin (hematoma). In very rare cases, your doctor may need to drain extra blood from the area.  Tip:  Apply an ice pack or bag of frozen peas to a bruise. Keep a thin cloth between the ice or frozen peas and your skin. The cold can help reduce redness and swelling.   Date Last Reviewed: 12/1/2016 2000-2018 [employer]. [address] [place] [address]. All rights reserved. This information is not intended as a substitute for professional medical care. Always follow your healthcare professional's instructions.          Your next 10 appointments already scheduled     Dec 05, 2018  1:00 PM CST   SHORT with EVI Cardona CNP   [facility] ([facility]    [address] [place] [address] [address]  [facility] [address]   [phone]            Dec 11, 2018  8:45 AM CST   MyChart Podiatry Return with Andres Johnson DPM   [facility] ([facility])    [address] [facility]  [address]  [facility] [address]   [phone]            Dec 11, 2018 12:00 PM CST   Return Visit with Ernestina Patel [facility] ([place] [address] [address]   [phone]            Dec 26, 2018 12:30 PM CST   Return Visit with Ernestina Patel [facility] ([facility]  [address] [place] [address]   [phone]            Jan 09, 2019  4:00 PM CST   MyChart Rheumatology [facility]    [address] [address]   [phone]            Feb 05, 2019  9:40 AM CST   (Arrive  by 9:25 AM)   Return Botox with Alejandrina Hernandez MD   [facility] ([facility])    [address]  3rd Floor  [facility] [address] [phone]            Oct 30, 2019  1:00 PM CDT   (Arrive by 12:45 PM)   Return Multiple Sclerosis with Joseph De La Torre MD   [facility] Multiple Sclerosis ([facility])    [address] [facility] [address]  [address]  [facility] [address] [phone]              24 Hour Appointment Hotline       To make an appointment at any [facility], call [phone] ([phone]). If you don't have a family doctor or clinic, we will help you find one. [facility] clinics are conveniently located to serve the needs of you and your family.             Review of your medicines      Our records show that you are taking the medicines listed below. If these are incorrect, please call your family doctor or clinic.        Dose / Directions Last dose taken    albuterol 108 (90 Base) MCG/ACT inhaler   Commonly known as:  PROAIR HFA/PROVENTIL HFA/VENTOLIN HFA   Dose:  2 puff   Quantity:  1 Inhaler        Inhale 2 puffs into the lungs every 6 hours as needed for shortness of breath / dyspnea or wheezing   Refills:  1        amitriptyline 50 MG tablet   Commonly known as:  ELAVIL   Dose:  50 mg   Quantity:  30 tablet        Take 1 tablet (50 mg) by mouth At Bedtime   Refills:  11        * apremilast 30 MG tablet   Commonly known as:  OTEZLA   Dose:  30 mg   Quantity:  180 tablet        Take 1 tablet (30 mg) by mouth 2 times daily   Refills:  0        * Apremilast 10 & 20 & 30 MG Tbpk   Commonly known as:  OTEZLA   Quantity:  1 each        Take by mouth according to the instructions on the packet. Hold for signs of infection,any GI upset, weight loss or depression concerns, and seek medical attention.   Refills:  0        artificial tears Oint ophthalmic ointment   Quantity:  1 Tube        0.5 inch  strip each eye at night   Refills:  11        ASPIRIN CHILDRENS 81 MG chewable tablet   Dose:  81 mg   Generic drug:  aspirin        Take 81 mg by mouth as needed   Refills:  0        aspirin-acetaminophen-caffeine 250-250-65 MG tablet   Commonly known as:  EXCEDRIN MIGRAINE   Dose:  1 tablet        Take 1 tablet by mouth every 6 hours as needed for headaches   Refills:  0        benzocaine 20 % Gel   Commonly known as:  TOPICALE XTRA   Quantity:  30 g        Apply as needed locally to mouth or nasal ulcers for pain; 4 times daily as needed   Refills:  1        betamethasone valerate 0.1 % external cream   Commonly known as:  VALISONE        Apply topically 2 times daily   Refills:  0        * BOTOX IJ   Dose:  165 Units        Inject 165 Units as directed once Lot#: /C3 Exp: 10/2020   Refills:  0        * BOTOX IJ   Dose:  165 Units        Inject 165 Units as directed once Lot # /C3  Exp:  10/2020   Refills:  0        * botulinum toxin type A 100 units injection   Commonly known as:  BOTOX   Dose:  200 Units   Quantity:  200 Units        Inject 200 Units into the muscle every 3 months   Refills:  3        * BOTOX IJ   Dose:  165 Units        Inject 165 Units into the muscle once Lot /C3 Exp 06/2020   Refills:  0        * BOTOX IJ   Dose:  175 Units        Inject 175 Units into the muscle once Lot # /C3 with Expiration Date:  11/2020   Refills:  0        * BOTOX IJ   Dose:  175 Units        Inject 175 Units into the muscle Lot: [id number] Exp: 01/2021   Refills:  0        clobetasol 0.05 % external cream   Commonly known as:  TEMOVATE   Quantity:  60 g        Apply topically 2 times daily   Refills:  0        colchicine 0.6 MG tablet   Commonly known as:  COLCYRS   Quantity:  180 tablet        Take 0.6 mg in AM and 0.6mg in PM every day   Refills:  0        cyproheptadine 4 MG tablet   Commonly known as:  PERIACTIN   Dose:  4 mg   Quantity:  30 tablet        Take 1 tablet (4 mg) by mouth At Bedtime  For nightmares   Refills:  2        diclofenac 1 % topical gel   Commonly known as:  VOLTAREN   Quantity:  100 g        Apply 2-4 grams to affected area(s) up to 4 times per day as needed. This is an anti-inflammatory medication.   Refills:  4        dicyclomine 20 MG tablet   Commonly known as:  BENTYL   Dose:  20 mg   Quantity:  120 tablet        Take 1 tablet (20 mg) by mouth 4 times daily as needed   Refills:  3        diltiazem 2% in PLO cream (FV COMPOUNDED) 2% Gel   Quantity:  30 g        Apply small pea size amount three times daily to anus until pain is gone.   Refills:  1        diphenhydrAMINE 25 MG tablet   Commonly known as:  BENADRYL   Dose:  25 mg   Quantity:  30 tablet        Take 1 tablet (25 mg) by mouth every 8 hours as needed for allergies   Refills:  0        EPINEPHrine 0.3 MG/0.3ML injection 2-pack   Commonly known as:  EPIPEN 2-EAMON   Dose:  0.3 mg   Quantity:  0.6 mL        Inject 0.3 mLs (0.3 mg) into the muscle as needed for anaphylaxis   Refills:  3        guanFACINE 1 MG tablet   Commonly known as:  TENEX   Quantity:  180 tablet        Take 3 tablets (3 mg) by mouth twice daily in the morning and evening.   Refills:  1        hydrocortisone 1 % Crea cream   Quantity:  28.35 g        Place rectally 2 times daily as needed for itching   Refills:  1        hydrOXYzine 25 MG tablet   Commonly known as:  ATARAX   Dose:  25-50 mg   Quantity:  90 tablet        Take 1-2 tablets (25-50 mg) by mouth every 6 hours as needed for anxiety   Refills:  2        hyoscyamine 0.125 MG tablet   Commonly known as:  ANASPAZ/LEVSIN   Dose:  0.125-0.25 mg   Quantity:  40 tablet        Take 1-2 tablets (125-250 mcg) by mouth every 4 hours as needed for cramping   Refills:  1        hypromellose 0.3 % Soln ophthalmic solution   Commonly known as:  GENTEAL   Dose:  1 drop        1 drop every hour as needed for dry eyes   Refills:  0        LACTAID PO        Take by mouth daily   Refills:  0        lactulose 20  GM/30ML Soln   Dose:  30 mL   Quantity:  300 mL        Take 30 mLs by mouth 3 times daily as needed   Refills:  3        lidocaine 2 % external gel   Commonly known as:  XYLOCAINE   Dose:  1 Tube   Indication:  Anesthesia to a Specific Part of the Body        Apply 1 Tube topically daily Reported on 4/21/2017   Refills:  0        lidocaine visc 2% 2.5mL/5mL & maalox/mylanta w/ simeth 2.5mL/5mL & diphenhydrAMINE 5mg/5mL Susp suspension   Commonly known as:  MAGIC Mouthwash HOSPITAL   Dose:  10 mL   Quantity:  1 Bottle        Swish and swallow 10 mLs in mouth every 6 hours as needed for mouth sores   Refills:  1        linaclotide 145 MCG capsule   Commonly known as:  LINZESS   Dose:  145 mcg   Quantity:  90 capsule        Take 1 capsule (145 mcg) by mouth every morning (before breakfast)   Refills:  1        Magnesium Aspartate HCl 1230 MG Pack   Dose:  1 packet   Quantity:  30 each        Take 1 packet by mouth daily as needed   Refills:  3        norgestimate-ethinyl estradiol 0.25-35 MG-MCG tablet   Commonly known as:  SPRINTEC 28   Dose:  1 tablet   Quantity:  84 tablet        Take 1 tablet by mouth daily   Refills:  4        omeprazole 40 MG DR capsule   Commonly known as:  priLOSEC   Quantity:  90 capsule        Take 1 capsule (40 mg) by mouth daily   Refills:  3        ondansetron 8 MG tablet   Commonly known as:  ZOFRAN   Dose:  8 mg   Quantity:  60 tablet        Take 1 tablet (8 mg) by mouth every 8 hours as needed for nausea   Refills:  1        phenazopyridine 95 MG tablet   Commonly known as:  AZO URINARY PAIN RELIEF   Dose:  190 mg   Quantity:  24 tablet        Take 2 tablets (190 mg) by mouth 3 times daily   Refills:  1        predniSONE 20 MG tablet   Commonly known as:  DELTASONE   Dose:  20 mg   Quantity:  10 tablet        Take 1 tablet (20 mg) by mouth 2 times daily   Refills:  0        sennosides 8.6 MG tablet   Commonly known as:  SENOKOT   Dose:  1 tablet        Take 1 tablet by mouth daily    Refills:  0        sucralfate 1 GM/10ML suspension   Commonly known as:  CARAFATE   Dose:  1 g   Quantity:  1200 mL        Take 10 mLs (1 g) by mouth 4 times daily   Refills:  2        triamcinolone 0.1 % external cream   Commonly known as:  KENALOG   Quantity:  80 g        Apply sparingly to lesions twice daily as needed   Refills:  1        * Notice:  This list has 8 medication(s) that are the same as other medications prescribed for you. Read the directions carefully, and ask your doctor or other care provider to review them with you.            Procedures and tests performed during your visit     D dimer quantitative    US Lower Extremity Venous Duplex Left      Orders Needing Specimen Collection     None      Pending Results     Date and Time Order Name Status Description    12/3/2018 1600 US Lower Extremity Venous Duplex Left Preliminary             Pending Culture Results     No orders found from 12/1/2018 to 12/4/2018.            Pending Results Instructions     If you had any lab results that were not finalized at the time of your Discharge, you can call the ED Lab Result RN at [phone]. You will be contacted by this team for any positive Lab results or changes in treatment. The nurses are available 7 days a week from 10A to 6:30P.  You can leave a message 24 hours per day and they will return your call.        Thank you for choosing [facility]       Thank you for choosing [facility] for your care. Our goal is always to provide you with excellent care. Hearing back from our patients is one way we can continue to improve our services. Please take a few minutes to complete the written survey that you may receive in the mail after you visit with us. Thank you!        [employer] Information     [employer] gives you secure access to your electronic health record. If you see a primary care provider, you can also send messages to your care team and make appointments. If you have questions, please call your primary  care clinic.  If you do not have a primary care provider, please call [phone] and they will assist you.        Care EveryWhere ID     This is your Care EveryWhere ID. This could be used by other organizations to access your [facility] medical records  [id number]        Equal Access to Services     NABIL GALEANO : Brandon Santiago, wacarmen hurtado, clover maealton juan, mike parsons. So [facility] [phone].    ATENCIÓN: Si habla español, tiene a [place] disposición servicios gratuitos de asistencia lingüística. Llame al [phone].    We comply with applicable federal civil rights laws and [place] laws. We do not discriminate on the basis of race, color, national origin, age, disability, sex, sexual orientation, or gender identity.            After Visit Summary       This is your record. Keep this with you and show to your community pharmacist(s) and doctor(s) at your next visit.

## 2018-12-03 NOTE — ED TRIAGE NOTES
LEG PAIN since thursday, fell and injured lower left leg, was seen but is concerned about mild bruising and reported coolness to the touch.

## 2018-12-03 NOTE — TELEPHONE ENCOUNTER
Left lower leg    Started after injury on Thursday she did go to  [facility] and had xray done, but now the left leg is swollen and cool to touch, painful  Worse than last thursday     Advised ED to rule out clot    She also request appointment with provider, she will cancel if not needed    Britni Matamoros RN   [facility]

## 2018-12-03 NOTE — ED PROVIDER NOTES
[place] EMERGENCY DEPARTMENT ([facility])  12/03/18   History     Chief Complaint   Patient presents with     Leg Pain     The history is provided by the patient.     Samara Oropeza is a 23 year old female with a medical history significant for anxiety and Behcet's disease who presents to the Emergency Department for evaluation of left lower leg pain and mild bruising that started on 11/29/2018.  Patient reports that she fell and injured her left leg the day prior on 11/28/2018 and since has been having worsening pain in her left lower leg.  She denies any swelling of the left leg.  Patient denies any history of DVT/PE and she is not on any anticoagulation medications.  Patient has not taken anything for pain prior to arrival.    I have reviewed the Medications, Allergies, Past Medical and Surgical History, and Social History in the Remote system.    Past Medical History:   Diagnosis Date     Anxiety      Arthritis      Behcet's disease (H)      Cervical adenitis May 2010     Chronic abdominal pain      Constipation, chronic 1994     Fibromyalgia      Gastro-oesophageal reflux disease      Gastroparesis      Migraines      Neuromuscular disorder (H)     fibramyalgia     Palpitations      Seizure (H)      Seizures (H)     unknown etiology     Syncope      Tourette's        Past Surgical History:   Procedure Laterality Date     ARTHROSCOPY KNEE WITH PATELLAR REALIGNMENT  7/25/2013    Procedure: ARTHROSCOPY KNEE WITH PATELLAR REALIGNMENT;  Left Knee Arthroscopy, Medial Patellofemoral Ligament Reconstruction with Allograft  ;  Surgeon: Jennifer Acevedo MD;  Location: [facility]     COLONOSCOPY  2015     DENTAL SURGERY  1996    Teeth removal     ENDOSCOPY UPPER, COLONOSCOPY, COMBINED  2005     HC ESOPH/GAS REFLUX TEST W NASAL IMPED >1 HR N/A 2/15/2017    Procedure: ESOPHAGEAL IMPEDENCE FUNCTION TEST WITH 24 HOUR PH GREATER THAN 1 HOUR;  Surgeon: Timothy Matta MD;  Location:  GI       Family  History   Problem Relation Age of Onset     Depression Mother      Neurologic Disorder Mother      Migraines, take imitrex injection.  Also in maternal grandmother.       Alcohol/Drug Father      Hypertension Father      Depression Father      Osteoarthritis Father      Cardiovascular Maternal Grandmother      Depression Maternal Grandmother      Hypertension Maternal Grandmother      Alzheimer Disease Maternal Grandmother      Cardiovascular Maternal Grandfather      Hypertension Maternal Grandfather      Depression Maternal Grandfather      Alcohol/Drug Maternal Grandfather      Cardiovascular Paternal Grandmother      Hypertension Paternal Grandmother      Cardiovascular Paternal Grandfather      Hypertension Paternal Grandfather      Glaucoma No family hx of      Macular Degeneration No family hx of        Social History   Substance Use Topics     Smoking status: Never Smoker     Smokeless tobacco: Never Used     Alcohol use 0.6 oz/week     1 Standard drinks or equivalent per week      Comment: rarely       No current facility-administered medications for this encounter.      Current Outpatient Prescriptions   Medication     sennosides (SENOKOT) 8.6 MG tablet     albuterol (PROAIR HFA/PROVENTIL HFA/VENTOLIN HFA) 108 (90 BASE) MCG/ACT Inhaler     amitriptyline (ELAVIL) 50 MG tablet     Apremilast (OTEZLA) 10 & 20 & 30 MG TBPK     apremilast (OTEZLA) 30 MG tablet     artificial tears OINT ophthalmic ointment     aspirin (ASPIRIN CHILDRENS) 81 MG chewable tablet     aspirin-acetaminophen-caffeine (EXCEDRIN MIGRAINE) 250-250-65 MG per tablet     benzocaine (TOPICALE XTRA) 20 % GEL     betamethasone valerate (VALISONE) 0.1 % cream     botulinum toxin type A (BOTOX) 100 UNITS injection     clobetasol (TEMOVATE) 0.05 % cream     colchicine (COLCYRS) 0.6 MG tablet     cyproheptadine (PERIACTIN) 4 MG tablet     diclofenac (VOLTAREN) 1 % GEL topical gel     dicyclomine (BENTYL) 20 MG tablet     diltiazem 2% in PLO cream,  FV COMPOUNDED, 2% GEL     diphenhydrAMINE (BENADRYL) 25 MG tablet     EPINEPHrine (EPIPEN 2-EAMON) 0.3 MG/0.3ML injection     guanFACINE (TENEX) 1 MG tablet     hydrocortisone 1 % CREA cream     hydrOXYzine (ATARAX) 25 MG tablet     hyoscyamine (ANASPAZ/LEVSIN) 0.125 MG tablet     hypromellose (GENTEAL) 0.3 % SOLN     Lactase (LACTAID PO)     lactulose 20 GM/30ML SOLN     lidocaine (XYLOCAINE) 2 % jelly     linaclotide (LINZESS) 145 MCG capsule     Magic Mouthwash (FV std formula) lidocaine visc 2% 2.5mL/5mL & maalox/mylanta w/ simeth 2.5mL/5mL & diphenhydrAMINE 5mg/5mL     Magnesium Aspartate HCl 1230 MG PACK     norgestimate-ethinyl estradiol (SPRINTEC 28) 0.25-35 MG-MCG per tablet     omeprazole (PRILOSEC) 40 MG capsule     OnabotulinumtoxinA (BOTOX IJ)     OnabotulinumtoxinA (BOTOX IJ)     OnabotulinumtoxinA (BOTOX IJ)     OnabotulinumtoxinA (BOTOX IJ)     OnabotulinumtoxinA (BOTOX IJ)     ondansetron (ZOFRAN) 8 MG tablet     phenazopyridine HCl (AZO URINARY PAIN RELIEF) tablet     predniSONE (DELTASONE) 20 MG tablet     sucralfate (CARAFATE) 1 GM/10ML suspension     triamcinolone (KENALOG) 0.1 % cream        Allergies   Allergen Reactions     Amoxil [Penicillins] Rash     Dad unsure of reaction.     Bee Venom Anaphylaxis     Contrast Dye Rash     Contrast Media Ready-Box [facility] [place], 04/09/2014.; Contrast Media Ready-Box [facility] [place], 04/09/2014.  NOTE: this is a contrast media oral with iodine. Premedicate with methylpred standard for IV contrast, request barium contrast for oral contrast.     Kiwi Swelling     Orange Fruit [Citrus] Anaphylaxis     Pineapple Anaphylaxis, Difficulty breathing and Rash     Reglan [Metoclopramide] Other (See Comments)     IV dose only, in ER, rapid heart rate.     Ace Inhibitors      Difficulty in breathing and GI upset     Amitiza [Lubiprostone] Nausea and Vomiting     Amoxicillin-Pot Clavulanate      Latex      Midazolam Unknown     Other reaction(s): Unknown  parent states that when pt takes  this medication, she wakes up being very violent .  parent states that when pt takes this medication, she wakes up being very violent .     No Clinical Screening - See Comments      Bleech/ chest tightness, itchy throat, swollen tongue, hives     Versed      Coming out of pelvic exam at age of 5, was kicking and screaming when coming out of the versed.     Adhesive Tape Rash     Azithromycin Hives and Rash     Cephalexin Itching and Rash     Itchy mouth  Itchy mouth     Keflex [Cephalexin-Fd&C Yellow #6] Hives         Review of Systems   Cardiovascular: Negative for leg swelling.   Musculoskeletal:        Positive for left lower leg pain   Skin: Positive for color change (mild bruising on left leg).   All other systems reviewed and are negative.      Physical Exam   BP: 117/81  Pulse: 96  Temp: 97.6  F (36.4  C)  Weight: 69.9 kg (154 lb 3.2 oz)  SpO2: 100 %      Physical Exam   Constitutional: She is oriented to person, place, and time. She appears well-developed and well-nourished. No distress.   HENT:   Head: Normocephalic and atraumatic.   Eyes: No scleral icterus.   Neck: Normal range of motion. Neck supple.   Musculoskeletal:   Small contusion left lateral calf with associated tenderness.  Negative Homans sign.  Distal pulses present.   Neurological: She is alert and oriented to person, place, and time.   Skin: Skin is warm and dry. No rash noted. She is not diaphoretic. No erythema. No pallor.       ED Course   2:59 PM  The patient was seen and examined by Samir Ding DO in [facility].     ED Course     Procedures           Labs Ordered and Resulted from Time of ED Arrival Up to the Time of Departure from the ED   D DIMER QUANTITATIVE - Abnormal; Notable for the following:        Result Value    D Dimer 0.8 (*)     All other components within normal limits        Results for orders placed or performed during the hospital encounter of 12/03/18 (from the past 24 hour(s))   D dimer quantitative   Result  Value Ref Range    D Dimer 0.8 (H) 0.0 - 0.50 ug/ml FEU   US Lower Extremity Venous Duplex Left    Narrative    PRELIMINARY REPORT - The following report is a preliminary  interpretation.       Impression    IMPRESSION:  No evidence of deep venous thrombosis in left lower extremity.             Results for orders placed or performed during the hospital encounter of 12/03/18   US Lower Extremity Venous Duplex Left    Narrative    PRELIMINARY REPORT - The following report is a preliminary  interpretation.       Impression    IMPRESSION:  No evidence of deep venous thrombosis in left lower extremity.   D dimer quantitative   Result Value Ref Range    D Dimer 0.8 (H) 0.0 - 0.50 ug/ml FEU         Assessments & Plan (with Medical Decision Making)   23-year-old female presents with a chief complaint of calf pain and a contusion following a direct blow.  She is a history of Bechets syndrome and was concerned about the possibility of DVT.  Ultrasound shows no evidence of DVT.  Exam is consistent with a contusion.  We will discharge the patient home.  They are reassured.    I have reviewed the nursing notes.    I have reviewed the findings, diagnosis, plan and need for follow up with the patient.    New Prescriptions    No medications on file       Final diagnoses:   Contusion of left lower extremity, initial encounter     ILuke, am serving as a trained medical scribe to document services personally performed by Samir Ding DO, based on the provider's statements to me.   Samir ROJO DO, was physically present and have reviewed and verified the accuracy of this note documented by Luke Cam.    12/3/2018   [facility], [place], EMERGENCY DEPARTMENT     Samir Ding DO  12/03/18 0109

## 2018-12-04 NOTE — DISCHARGE INSTRUCTIONS
Follow-up with your primary care provider.  Return the emergency department for any new or worsening symptoms.    Bruises (Contusions)    A contusion is a bruise. A bruise happens when a blow to your body doesn't break the skin but does break blood vessels beneath the skin. Blood leaking from the broken vessels causes redness and swelling. As it heals, your bruise is likely to turn colors like purple, green, and yellow. This is normal. The bruise should fade in 2 or 3 weeks.  Factors that make you more likely to bruise  Almost everyone bruises now and then. Certain people do bruise more easily than others. You're more prone to bruising as you get older. That's because blood vessels become more fragile with age. You're also more likely to bruise if you have a clotting disorder such as hemophilia or take medicines that reduce clotting, including aspirin and coumadin.  When to go to the emergency room (ER)  Bruises almost always heal on their own without special treatment. But for some people, a bad bruise can be serious. Seek medical care if you:    Have a clotting disorder such as hemophilia    Have cirrhosis or other serious liver disease    Take blood-thinning medicines such as warfarin  What to expect in the ER  A doctor will examine your bruise and ask about any health conditions you have. In some cases, you may have a test to check how well your blood clots. Other treatment will depend on your needs.  Follow-up care  Sometimes a bruise gets worse instead of better. It may become larger and more swollen. This can occur when your body walls off a small pool of blood under the skin (hematoma). In very rare cases, your doctor may need to drain extra blood from the area.  Tip:  Apply an ice pack or bag of frozen peas to a bruise. Keep a thin cloth between the ice or frozen peas and your skin. The cold can help reduce redness and swelling.   Date Last Reviewed: 12/1/2016 2000-2018 [employer] [address]  [place] [address]. All rights reserved. This information is not intended as a substitute for professional medical care. Always follow your healthcare professional's instructions.

## 2018-12-05 ENCOUNTER — OFFICE VISIT (OUTPATIENT)
Dept: FAMILY MEDICINE | Facility: CLINIC | Age: 23
End: 2018-12-05
Payer: COMMERCIAL

## 2018-12-05 VITALS
HEART RATE: 94 BPM | BODY MASS INDEX: 27.72 KG/M2 | TEMPERATURE: 97.6 F | OXYGEN SATURATION: 100 % | DIASTOLIC BLOOD PRESSURE: 78 MMHG | RESPIRATION RATE: 14 BRPM | WEIGHT: 154 LBS | SYSTOLIC BLOOD PRESSURE: 114 MMHG

## 2018-12-05 DIAGNOSIS — R30.0 DYSURIA: Primary | ICD-10-CM

## 2018-12-05 DIAGNOSIS — N39.0 FREQUENT UTI: ICD-10-CM

## 2018-12-05 DIAGNOSIS — Z30.09 GENERAL COUNSELING FOR PRESCRIPTION OF ORAL CONTRACEPTIVES: ICD-10-CM

## 2018-12-05 DIAGNOSIS — G89.29 CHRONIC ABDOMINAL PAIN: ICD-10-CM

## 2018-12-05 DIAGNOSIS — M35.2 BEHCET'S DISEASE (H): ICD-10-CM

## 2018-12-05 DIAGNOSIS — F95.2 TOURETTE'S SYNDROME: ICD-10-CM

## 2018-12-05 DIAGNOSIS — K59.00 CONSTIPATION, UNSPECIFIED CONSTIPATION TYPE: ICD-10-CM

## 2018-12-05 DIAGNOSIS — G24.3 CERVICAL DYSTONIA: ICD-10-CM

## 2018-12-05 DIAGNOSIS — K58.2 IRRITABLE BOWEL SYNDROME WITH BOTH CONSTIPATION AND DIARRHEA: ICD-10-CM

## 2018-12-05 DIAGNOSIS — M06.09 RHEUMATOID ARTHRITIS OF MULTIPLE SITES WITHOUT RHEUMATOID FACTOR (H): ICD-10-CM

## 2018-12-05 DIAGNOSIS — R10.9 CHRONIC ABDOMINAL PAIN: ICD-10-CM

## 2018-12-05 DIAGNOSIS — M79.7 FIBROMYALGIA: ICD-10-CM

## 2018-12-05 DIAGNOSIS — K31.84 GASTROPARESIS: ICD-10-CM

## 2018-12-05 DIAGNOSIS — F95.9 TIC: ICD-10-CM

## 2018-12-05 DIAGNOSIS — S06.0X9D CONCUSSION WITH LOSS OF CONSCIOUSNESS, SUBSEQUENT ENCOUNTER: ICD-10-CM

## 2018-12-05 DIAGNOSIS — K21.9 GASTROESOPHAGEAL REFLUX DISEASE WITHOUT ESOPHAGITIS: ICD-10-CM

## 2018-12-05 DIAGNOSIS — F51.5 NIGHTMARES: ICD-10-CM

## 2018-12-05 DIAGNOSIS — K90.9 INTESTINAL MALABSORPTION, UNSPECIFIED TYPE: ICD-10-CM

## 2018-12-05 DIAGNOSIS — K59.04 CHRONIC IDIOPATHIC CONSTIPATION: ICD-10-CM

## 2018-12-05 DIAGNOSIS — F45.1 SOMATIC SYMPTOM DISORDER: ICD-10-CM

## 2018-12-05 DIAGNOSIS — F41.1 GAD (GENERALIZED ANXIETY DISORDER): ICD-10-CM

## 2018-12-05 LAB
ALBUMIN UR-MCNC: 30 MG/DL
APPEARANCE UR: ABNORMAL
BACTERIA #/AREA URNS HPF: ABNORMAL /HPF
BILIRUB UR QL STRIP: NEGATIVE
COLOR UR AUTO: YELLOW
GLUCOSE UR STRIP-MCNC: NEGATIVE MG/DL
HGB UR QL STRIP: ABNORMAL
KETONES UR STRIP-MCNC: 15 MG/DL
LEUKOCYTE ESTERASE UR QL STRIP: ABNORMAL
MUCOUS THREADS #/AREA URNS LPF: PRESENT /LPF
NITRATE UR QL: NEGATIVE
NON-SQ EPI CELLS #/AREA URNS LPF: ABNORMAL /LPF
PH UR STRIP: 7 PH (ref 5–7)
RBC #/AREA URNS AUTO: ABNORMAL /HPF
SOURCE: ABNORMAL
SP GR UR STRIP: 1.02 (ref 1–1.03)
UROBILINOGEN UR STRIP-ACNC: 0.2 EU/DL (ref 0.2–1)
WBC #/AREA URNS AUTO: ABNORMAL /HPF

## 2018-12-05 PROCEDURE — 87088 URINE BACTERIA CULTURE: CPT | Performed by: NURSE PRACTITIONER

## 2018-12-05 PROCEDURE — 81001 URINALYSIS AUTO W/SCOPE: CPT | Performed by: NURSE PRACTITIONER

## 2018-12-05 PROCEDURE — 87186 SC STD MICRODIL/AGAR DIL: CPT | Performed by: NURSE PRACTITIONER

## 2018-12-05 PROCEDURE — 87086 URINE CULTURE/COLONY COUNT: CPT | Performed by: NURSE PRACTITIONER

## 2018-12-05 PROCEDURE — 99214 OFFICE O/P EST MOD 30 MIN: CPT | Performed by: NURSE PRACTITIONER

## 2018-12-05 RX ORDER — NITROFURANTOIN 25; 75 MG/1; MG/1
100 CAPSULE ORAL AT BEDTIME
Qty: 90 CAPSULE | Refills: 1 | Status: ON HOLD | OUTPATIENT
Start: 2018-12-05 | End: 2019-05-07

## 2018-12-05 RX ORDER — SULFAMETHOXAZOLE/TRIMETHOPRIM 800-160 MG
1 TABLET ORAL 2 TIMES DAILY
Qty: 6 TABLET | Refills: 0 | Status: ON HOLD | OUTPATIENT
Start: 2018-12-05 | End: 2019-03-15

## 2018-12-05 RX ORDER — NORGESTIMATE AND ETHINYL ESTRADIOL 0.25-0.035
1 KIT ORAL DAILY
Qty: 84 TABLET | Refills: 4 | Status: ON HOLD | OUTPATIENT
Start: 2018-12-05 | End: 2019-01-02

## 2018-12-05 RX ORDER — CYPROHEPTADINE HYDROCHLORIDE 4 MG/1
4 TABLET ORAL AT BEDTIME
Qty: 30 TABLET | Refills: 2 | Status: SHIPPED | OUTPATIENT
Start: 2018-12-05 | End: 2019-02-27

## 2018-12-05 RX ORDER — HYDROXYZINE HYDROCHLORIDE 25 MG/1
25-50 TABLET, FILM COATED ORAL EVERY 6 HOURS PRN
Qty: 90 TABLET | Refills: 2 | Status: ON HOLD | OUTPATIENT
Start: 2018-12-05 | End: 2019-01-02

## 2018-12-05 RX ORDER — GUANFACINE 1 MG/1
TABLET ORAL
Qty: 180 TABLET | Refills: 3 | Status: SHIPPED | OUTPATIENT
Start: 2018-12-05 | End: 2019-01-15

## 2018-12-05 RX ORDER — LACTULOSE 20 G/30ML
30 SOLUTION ORAL 3 TIMES DAILY PRN
Qty: 300 ML | Refills: 3 | Status: SHIPPED | OUTPATIENT
Start: 2018-12-05 | End: 2019-04-08

## 2018-12-05 NOTE — MR AVS SNAPSHOT
After Visit Summary   12/5/2018    Samara Oropeza    MRN: [id number]           Patient Information     Date Of Birth          1995        Visit Information        Provider Department      12/5/2018 1:00 PM Sonja Abreu APRN [facility]        Today's Diagnoses     Dysuria    -  1    Behcet's disease (H)        Chronic abdominal pain        Concussion with loss of consciousness, subsequent encounter        Gastroparesis        Gastroesophageal reflux disease without esophagitis        Irritable bowel syndrome with both constipation and diarrhea        Intestinal malabsorption, unspecified type        Rheumatoid arthritis of multiple sites without rheumatoid factor (H)        Somatic symptom disorder        Tourette's syndrome        Fibromyalgia        Cervical dystonia        Nightmares        Chronic idiopathic constipation        Tics - Tourette syndrome        TAHIR (generalized anxiety disorder)        Constipation, unspecified constipation type        General counseling for prescription of oral contraceptives        Frequent UTI           Follow-ups after your visit        Additional Services     CARE COORDINATION REFERRAL       Services are provided by a Care Coordinator for people with complex needs such as: medical, social, or financial troubles.  The Care Coordinator works with the patient and their Primary Care Provider to determine health goals, obtain resources, achieve outcomes, and develop care plans that help coordinate the patient's care.     Reason for Referral: Financial Support: Food, Housing, Medication Affordability  and interested in applying for disbaility   Additional pertinent details:      Clinical Staff have discussed the Care Coordination Referral with the patient and/or caregiver: yes            GASTROENTEROLOGY ADULT REF CONSULT ONLY       Preferred Location: [facility] [phone]      Please be aware that coverage of these  services is subject to the terms and limitations of your health insurance plan.  Call member services at your health plan with any benefit or coverage questions.  Any procedures must be performed at a [place] facility OR coordinated by your clinic's referral office.    Please bring the following with you to your appointment:    (1) Any X-Rays, CTs or MRIs which have been performed.  Contact the facility where they were done to arrange for  prior to your scheduled appointment.    (2) List of current medications   (3) This referral request   (4) Any documents/labs given to you for this referral            PHYSICAL THERAPY REFERRAL       If you have not heard from the scheduling office within 2 business days, please call [phone] for all locations, with the exception of [place], please call [phone] and [place], please call [phone].    Please be aware that coverage of these services is subject to the terms and limitations of your health insurance plan.  Call member services at your health plan with any benefit or coverage questions.                  Your next 10 appointments already scheduled     Dec 11, 2018  8:45 AM CST   MyChart Podiatry Return with Andres Johnson DPM   [facility] ([facility])    [address]  [address]  [facility] [address]   [phone]            Dec 11, 2018 12:00 PM CST   Return Visit with Ernestina Patel [facility] ([facility]  [address] [place] [id number]   [phone]            Dec 26, 2018 12:30 PM CST   Return Visit with Ernestina Patel [facility] ([place] [address] [place] [address]   [phone]            Jan 09, 2019  4:00 PM CST   MyChart Rheumatology Return with Austen Marquez MD   [facility]  [facility])    [address] [address]   [phone]            Feb 05, 2019  9:40 AM CST   (Arrive by 9:25 AM)   Return Botox with Alejandrina Hernandez MD   [facility] ([facility])    [address]  3rd Floor  [facility] [id number] [phone]            Oct 30, 2019  1:00 PM CDT   (Arrive by 12:45 PM)   Return Multiple Sclerosis with Joseph De La Torre MD   [facility] ([facility])    [address]  [address]  [facility] [id number] [phone]              Future tests that were ordered for you today     Open Future Orders        Priority Expected Expires Ordered    PHYSICAL THERAPY REFERRAL Routine  12/5/2019 12/5/2018            Who to contact     If you have questions or need follow up information about today's clinic visit or your schedule please contact [facility] directly at [phone].  Normal or non-critical lab and imaging results will be communicated to you by TabSprinthart, letter or phone within 4 business days after the clinic has received the results. If you do not hear from us within 7 days, please contact the clinic through [employer] or phone. If you have a critical or abnormal lab result, we will notify you by phone as soon as possible.  Submit refill requests through fflap or call your pharmacy and they will forward the refill request to us. Please allow 3 business days for your refill to be completed.          Additional Information About Your Visit        fflap Information     fflap gives you secure access to your electronic health record. If you see a primary care provider, you can also send messages to your care team and make appointments. If you have questions, please call your primary care clinic.  If you do not have a primary care provider, please call [phone] and they will assist you.         Care EveryWhere ID     This is your Care EveryWhere ID. This could be used by other organizations to access your [facility] medical records  [id number]        Your Vitals Were     Pulse Temperature Respirations Last Period Pulse Oximetry BMI (Body Mass Index)    94 97.6  F (36.4  C) (Oral) 14 11/30/2018 100% 27.72 kg/m2       Blood Pressure from Last 3 Encounters:   12/05/18 114/78   12/03/18 115/78   11/13/18 119/80    Weight from Last 3 Encounters:   12/05/18 154 lb (69.9 kg)   12/03/18 154 lb 3.2 oz (69.9 kg)   11/13/18 154 lb (69.9 kg)              We Performed the Following     *UA reflex to Microscopic and Culture ([place]; [facility]; [facility]; [facility]; [facility]; [facility]; [place]; [place])     CARE COORDINATION REFERRAL     GASTROENTEROLOGY ADULT REF CONSULT ONLY          Today's Medication Changes          These changes are accurate as of 12/5/18  1:32 PM.  If you have any questions, ask your nurse or doctor.               Start taking these medicines.        Dose/Directions    nitroFURantoin macrocrystal-monohydrate 100 MG capsule   Commonly known as:  MACROBID   Used for:  Frequent UTI   Started by:  Sonja Abreu APRN CNP        Dose:  100 mg   Take 1 capsule (100 mg) by mouth At Bedtime   Quantity:  90 capsule   Refills:  1       sulfamethoxazole-trimethoprim 800-160 MG tablet   Commonly known as:  BACTRIM DS/SEPTRA DS   Used for:  Dysuria   Started by:  Sonja Abreu APRN CNP        Dose:  1 tablet   Take 1 tablet by mouth 2 times daily for 3 days   Quantity:  6 tablet   Refills:  0         These medicines have changed or have updated prescriptions.        Dose/Directions    lactulose 20 GM/30ML Soln   This may have changed:  reasons to take this   Used for:  Constipation, unspecified constipation type   Changed by:  Sonja Abreu APRN CNP        Dose:  30 mL   Take 30 mLs by mouth 3 times daily as needed (for constipation)   Quantity:  300 mL    Refills:  3            Where to get your medicines      Some of these will need a paper prescription and others can be bought over the counter.  Ask your nurse if you have questions.     Bring a paper prescription for each of these medications     cyproheptadine 4 MG tablet    guanFACINE 1 MG tablet    hydrOXYzine 25 MG tablet    lactulose 20 GM/30ML Soln    linaclotide 145 MCG capsule    nitroFURantoin macrocrystal-monohydrate 100 MG capsule    norgestimate-ethinyl estradiol 0.25-35 MG-MCG tablet    sulfamethoxazole-trimethoprim 800-160 MG tablet                Primary Care Provider Office Phone # Fax #    Sonja Abreu, APRN [facility] [phone] [address] [facility] [address]  [facility] [address]        Equal Access to Services     NABIL GALEANO : Hadii aad ku hadasho Sotiffany, waaxda luqadaha, qaybta kaalmada adeegyada, mike rodriguez . So [facility] [phone].    ATENCIÓN: Si habla español, tiene a livingston disposición servicios gratuitos de asistencia lingüística. [facility] [phone].    We comply with applicable federal civil rights laws and [place] laws. We do not discriminate on the basis of race, color, national origin, age, disability, sex, sexual orientation, or gender identity.            Thank you!     Thank you for choosing [facility]  for your care. Our goal is always to provide you with excellent care. Hearing back from our patients is one way we can continue to improve our services. Please take a few minutes to complete the written survey that you may receive in the mail after your visit with us. Thank you!             Your Updated Medication List - Protect others around you: Learn how to safely use, store and throw away your medicines at www.disposemymeds.org.          This list is accurate as of 12/5/18  1:32 PM.  Always use your most recent med list.                   Brand Name Dispense Instructions for use Diagnosis    albuterol 108 (90 Base)  MCG/ACT inhaler    PROAIR HFA/PROVENTIL HFA/VENTOLIN HFA    1 Inhaler    Inhale 2 puffs into the lungs every 6 hours as needed for shortness of breath / dyspnea or wheezing    Atypical chest pain       amitriptyline 50 MG tablet    ELAVIL    30 tablet    Take 1 tablet (50 mg) by mouth At Bedtime    Abdominal pain, generalized, Insomnia due to medical condition       * apremilast 30 MG tablet    OTEZLA    180 tablet    Take 1 tablet (30 mg) by mouth 2 times daily    Behcet's disease (H)       * Apremilast 10 & 20 & 30 MG Tbpk    OTEZLA    1 each    Take by mouth according to the instructions on the packet. Hold for signs of infection,any GI upset, weight loss or depression concerns, and seek medical attention.    Behcet's disease (H)       artificial tears Oint ophthalmic ointment     1 Tube    0.5 inch strip each eye at night    Bilateral dry eyes       ASPIRIN CHILDRENS 81 MG chewable tablet   Generic drug:  aspirin      Take 81 mg by mouth as needed        aspirin-acetaminophen-caffeine 250-250-65 MG tablet    EXCEDRIN MIGRAINE     Take 1 tablet by mouth every 6 hours as needed for headaches        benzocaine 20 % Gel    TOPICALE XTRA    30 g    Apply as needed locally to mouth or nasal ulcers for pain; 4 times daily as needed    Behcet's disease (H)       betamethasone valerate 0.1 % external cream    VALISONE     Apply topically 2 times daily        * BOTOX IJ      Inject 165 Units as directed once Lot#: /C3 Exp: 10/2020        * BOTOX IJ      Inject 165 Units as directed once Lot # /C3  Exp:  10/2020        * botulinum toxin type A 100 units injection    BOTOX    200 Units    Inject 200 Units into the muscle every 3 months    Cervical dystonia       * BOTOX IJ      Inject 165 Units into the muscle once Lot /C3 Exp 06/2020        * BOTOX IJ      Inject 175 Units into the muscle once Lot # /C3 with Expiration Date:  11/2020        * BOTOX IJ      Inject 175 Units into the muscle Lot: [id number]  Exp: 01/2021        clobetasol 0.05 % external cream    TEMOVATE    60 g    Apply topically 2 times daily    Folliculitis       colchicine 0.6 MG tablet    COLCYRS    180 tablet    Take 0.6 mg in AM and 0.6mg in PM every day    Behcet's disease (H)       cyproheptadine 4 MG tablet    PERIACTIN    30 tablet    Take 1 tablet (4 mg) by mouth At Bedtime For nightmares    Nightmares       diclofenac 1 % topical gel    VOLTAREN    100 g    Apply 2-4 grams to affected area(s) up to 4 times per day as needed. This is an anti-inflammatory medication.    Polyarthralgia       dicyclomine 20 MG tablet    BENTYL    120 tablet    Take 1 tablet (20 mg) by mouth 4 times daily as needed    Abdominal cramping       diltiazem 2% in PLO cream (FV COMPOUNDED) 2% Gel     30 g    Apply small pea size amount three times daily to anus until pain is gone.    Anal fissure       diphenhydrAMINE 25 MG tablet    BENADRYL    30 tablet    Take 1 tablet (25 mg) by mouth every 8 hours as needed for allergies        EPINEPHrine 0.3 MG/0.3ML injection 2-pack    EPIPEN 2-EAMON    0.6 mL    Inject 0.3 mLs (0.3 mg) into the muscle as needed for anaphylaxis    Hx of bee sting allergy       guanFACINE 1 MG tablet    TENEX    180 tablet    Take 3 tablets (3 mg) by mouth twice daily in the morning and evening.    Tic       hydrocortisone 1 % Crea cream     28.35 g    Place rectally 2 times daily as needed for itching    Hemorrhoids, unspecified hemorrhoid type       hydrOXYzine 25 MG tablet    ATARAX    90 tablet    Take 1-2 tablets (25-50 mg) by mouth every 6 hours as needed for anxiety    TAHIR (generalized anxiety disorder)       hyoscyamine 0.125 MG tablet    ANASPAZ/LEVSIN    40 tablet    Take 1-2 tablets (125-250 mcg) by mouth every 4 hours as needed for cramping    Abdominal pain, generalized       hypromellose 0.3 % Soln ophthalmic solution    GENTEAL     1 drop every hour as needed for dry eyes        LACTAID PO      Take by mouth daily        lactulose  20 GM/30ML Soln     300 mL    Take 30 mLs by mouth 3 times daily as needed (for constipation)    Constipation, unspecified constipation type       lidocaine 2 % external gel    XYLOCAINE     Apply 1 Tube topically daily Reported on 4/21/2017        lidocaine visc 2% 2.5mL/5mL & maalox/mylanta w/ simeth 2.5mL/5mL & diphenhydrAMINE 5mg/5mL Susp suspension    [facility] Mouthwash \[facility] [place] [facility]\""    1 Bottle    Swish and swallow 10 mLs in mouth every 6 hours as needed for mouth sores    Behcet's syndrome (H)       linaclotide 145 MCG capsule    LINZESS    90 capsule    Take 1 capsule (145 mcg) by mouth every morning (before breakfast)    Chronic idiopathic constipation       Magnesium Aspartate HCl 1230 MG Pack     30 each    Take 1 packet by mouth daily as needed    Irritable bowel syndrome with constipation       nitroFURantoin macrocrystal-monohydrate 100 MG capsule    MACROBID    90 capsule    Take 1 capsule (100 mg) by mouth At Bedtime    Frequent UTI       norgestimate-ethinyl estradiol 0.25-35 MG-MCG tablet    SPRINTEC 28    84 tablet    Take 1 tablet by mouth daily    General counseling for prescription of oral contraceptives       omeprazole 40 MG DR capsule    priLOSEC    90 capsule    Take 1 capsule (40 mg) by mouth daily    Gastroesophageal reflux disease, esophagitis presence not specified       ondansetron 8 MG tablet    ZOFRAN    60 tablet    Take 1 tablet (8 mg) by mouth every 8 hours as needed for nausea    Nausea       phenazopyridine 95 MG tablet    AZO URINARY PAIN RELIEF    24 tablet    Take 2 tablets (190 mg) by mouth 3 times daily    Dysuria       predniSONE 20 MG tablet    DELTASONE    10 tablet    Take 1 tablet (20 mg) by mouth 2 times daily    Urticaria       sennosides 8.6 MG tablet    SENOKOT     Take 1 tablet by mouth daily        sucralfate 1 GM/10ML suspension    CARAFATE    1200 mL    Take 10 mLs (1 g) by mouth 4 times daily    Bile reflux gastritis, Nausea       sulfamethoxazole-trimethoprim 800-160  MG tablet    BACTRIM DS/SEPTRA DS    6 tablet    Take 1 tablet by mouth 2 times daily for 3 days    Dysuria       triamcinolone 0.1 % external cream    KENALOG    80 g    Apply sparingly to lesions twice daily as needed    Behcet's syndrome (H)       * Notice:  This list has 8 medication(s) that are the same as other medications prescribed for you. Read the directions carefully, and ask your doctor or other care provider to review them with you.

## 2018-12-05 NOTE — PROGRESS NOTES
SUBJECTIVE:   Samara Oropeza is a 23 year old female who presents to clinic today for the following health issues:      ED/UC Followup:    Facility:  [facility] Emergency Department    Date of visit: 12/3/18  Reason for visit: blood clot  Current Status: same        Joint Pain    Onset: couple days ago     Description:   Location: lower left leg blood clot  Character: numb    Intensity: 5/10    Progression of Symptoms: same    Accompanying Signs & Symptoms:  Other symptoms: numbness    History:   Previous similar pain: no       Precipitating factors:   Trauma or overuse: no     Alleviating factors:  Improved by: nothing    Therapies Tried and outcome: none      Stomach pain is ongoing. Currently not seeing a gastroenterologist, and would like to establish with someone. Experiences daily low appetite, early satiety and crampy abdominal pain. Ongoing nausea and constipation. She needs refills on several of her medications until she is able to see GI.    Also experiencing daily musculoskeletal pain. Worse on left side in particular; tends to have very tight muscles throughout her trunk on her left side, neck and shoulders.  Experiencing frequent UTI.  Symptoms of dysuria and blood in urine x 3 days; no symptoms of vaginal discharge or itching, not sexually active right now.         Problem list and histories reviewed & adjusted, as indicated.  Additional history: as documented    Patient Active Problem List   Diagnosis     Tics - Tourette syndrome     IBS (irritable bowel syndrome)     Allergic rhinitis     Generalized anxiety disorder     Knee pain     Concussion     Milk protein intolerance     Headaches due to old head injury     Behcet's disease (H)     Migraine     Spell of shaking     On colchicine therapy     Palpitations     Fibromyalgia     Rheumatoid arthritis of multiple sites without rheumatoid factor (H)     Raynaud's disease without gangrene     Chronic abdominal pain     Patellofemoral  instability     PTSD (post-traumatic stress disorder)     Cervical dystonia     Acute left ankle pain     Cervical pain     Major depressive disorder, recurrent episode, moderate (H)     Chronic pain syndrome     Convulsions, unspecified convulsion type (H)     Transient alteration of awareness     Vitamin D deficiency     Mobile cecum     Spells of decreased attentiveness     Pelvic floor weakness     Somatic symptom disorder     Anxiety state     Aphthous ulcer     Cough     Dysthymic disorder     Gastroparesis     GERD (gastroesophageal reflux disease)     Displacement of lumbar intervertebral disc without myelopathy     Intestinal malabsorption     Iron deficiency associated with nonfamilial restless legs syndrome     Other specified symptom associated with female genital organs     Enthesopathy of hip region     Tourette's syndrome     Past Surgical History:   Procedure Laterality Date     ARTHROSCOPY KNEE WITH PATELLAR REALIGNMENT  7/25/2013    Procedure: ARTHROSCOPY KNEE WITH PATELLAR REALIGNMENT;  Left Knee Arthroscopy, Medial Patellofemoral Ligament Reconstruction with Allograft  ;  Surgeon: Jennifer Acevedo MD;  [facility]: [facility]     COLONOSCOPY  2015     DENTAL SURGERY  1996    Teeth removal     ENDOSCOPY UPPER, COLONOSCOPY, COMBINED  2005     HC ESOPH/GAS REFLUX TEST W NASAL IMPED >1 HR N/A 2/15/2017    Procedure: ESOPHAGEAL IMPEDENCE FUNCTION TEST WITH 24 HOUR PH GREATER THAN 1 HOUR;  Surgeon: Timothy Matta MD;  Location:  GI       Social History   Substance Use Topics     Smoking status: Never Smoker     Smokeless tobacco: Never Used     Alcohol use 0.6 oz/week     1 Standard drinks or equivalent per week      Comment: rarely     Family History   Problem Relation Age of Onset     Depression Mother      Neurologic Disorder Mother      Migraines, take imitrex injection.  Also in maternal grandmother.       Alcohol/Drug Father      Hypertension Father      Depression Father      Osteoarthritis  Father      Cardiovascular Maternal Grandmother      Depression Maternal Grandmother      Hypertension Maternal Grandmother      Alzheimer Disease Maternal Grandmother      Cardiovascular Maternal Grandfather      Hypertension Maternal Grandfather      Depression Maternal Grandfather      Alcohol/Drug Maternal Grandfather      Cardiovascular Paternal Grandmother      Hypertension Paternal Grandmother      Cardiovascular Paternal Grandfather      Hypertension Paternal Grandfather      Glaucoma No family hx of      Macular Degeneration No family hx of            Reviewed and updated as needed this visit by clinical staff       Reviewed and updated as needed this visit by Provider         ROS:  Constitutional, HEENT, cardiovascular, pulmonary, gi and gu systems are negative, except as otherwise noted.    OBJECTIVE:     /78  Pulse 94  Temp 97.6  F (36.4  C) (Oral)  Resp 14  Wt 154 lb (69.9 kg)  LMP 11/30/2018  SpO2 100%  BMI 27.72 kg/m2  Body mass index is 27.72 kg/(m^2).   GENERAL: healthy, alert and no distress  HENT: ear canals and TM's normal, nose and mouth without ulcers or lesions  NECK: no adenopathy, no asymmetry, masses, or scars and thyroid normal to palpation  RESP: lungs clear to auscultation - no rales, rhonchi or wheezes  CV: regular rate and rhythm, normal S1 S2, no S3 or S4, no murmur, click or rub, no peripheral edema and peripheral pulses strong  ABDOMEN: soft, nontender, no hepatosplenomegaly, no masses and bowel sounds normal  MS: tenderness to palpation left lower leg; light bruising present on left lower leg  SKIN: no suspicious lesions or rashes  PSYCH: mentation appears normal, affect normal/bright    Diagnostic Test Results:  No results found for this or any previous visit (from the past 24 hour(s)).    ASSESSMENT/PLAN:     Problem List Items Addressed This Visit     Tourette's syndrome    Relevant Orders    PHYSICAL THERAPY REFERRAL    CARE COORDINATION REFERRAL (Completed)     Tics - Tourette syndrome    Relevant Medications    guanFACINE (TENEX) 1 MG tablet    Somatic symptom disorder    Relevant Orders    PHYSICAL THERAPY REFERRAL    Rheumatoid arthritis of multiple sites without rheumatoid factor (H)    Relevant Medications    linaclotide (LINZESS) 145 MCG capsule    Other Relevant Orders    PHYSICAL THERAPY REFERRAL    CARE COORDINATION REFERRAL (Completed)    Intestinal malabsorption    Relevant Orders    GASTROENTEROLOGY ADULT REF CONSULT ONLY    IBS (irritable bowel syndrome)    Relevant Medications    linaclotide (LINZESS) 145 MCG capsule    lactulose 20 GM/30ML SOLN    norgestimate-ethinyl estradiol (SPRINTEC 28) 0.25-35 MG-MCG tablet    Other Relevant Orders    GASTROENTEROLOGY ADULT REF CONSULT ONLY    GERD (gastroesophageal reflux disease)    Relevant Medications    linaclotide (LINZESS) 145 MCG capsule    lactulose 20 GM/30ML SOLN    Other Relevant Orders    GASTROENTEROLOGY ADULT REF CONSULT ONLY    CARE COORDINATION REFERRAL (Completed)    Gastroparesis    Relevant Orders    GASTROENTEROLOGY ADULT REF CONSULT ONLY    CARE COORDINATION REFERRAL (Completed)    Fibromyalgia    Relevant Orders    PHYSICAL THERAPY REFERRAL    CARE COORDINATION REFERRAL (Completed)    Concussion    Chronic abdominal pain    Relevant Orders    GASTROENTEROLOGY ADULT REF CONSULT ONLY    CARE COORDINATION REFERRAL (Completed)    Cervical dystonia    Relevant Orders    PHYSICAL THERAPY REFERRAL    CARE COORDINATION REFERRAL (Completed)    Behcet's disease (H)    Relevant Medications    linaclotide (LINZESS) 145 MCG capsule    Other Relevant Orders    CARE COORDINATION REFERRAL (Completed)      Other Visit Diagnoses     Dysuria    -  Primary    Relevant Medications    sulfamethoxazole-trimethoprim (BACTRIM DS/SEPTRA DS) 800-160 MG tablet    Other Relevant Orders    *UA reflex to Microscopic and Culture ([facility] (except [place] and [place]) (Completed)    Urine Microscopic  (Completed)    Urine Culture Aerobic Bacterial (Completed)    Nightmares        Relevant Medications    cyproheptadine (PERIACTIN) 4 MG tablet    hydrOXYzine (ATARAX) 25 MG tablet    Chronic idiopathic constipation        Relevant Medications    linaclotide (LINZESS) 145 MCG capsule    lactulose 20 GM/30ML SOLN    TAHIR (generalized anxiety disorder)        Relevant Medications    hydrOXYzine (ATARAX) 25 MG tablet    Constipation, unspecified constipation type        Relevant Medications    lactulose 20 GM/30ML SOLN    General counseling for prescription of oral contraceptives        Relevant Medications    norgestimate-ethinyl estradiol (SPRINTEC 28) 0.25-35 MG-MCG tablet    Frequent UTI        Relevant Medications    nitroFURantoin macrocrystal-monohydrate (MACROBID) 100 MG capsule    sulfamethoxazole-trimethoprim (BACTRIM DS/SEPTRA DS) 800-160 MG tablet         EVI Cardona [facility]  Please note greater than 50% of this 30 minute appointment were spent in face to face counseling with the patient of the issues described above in the history of present illness and in the plan, including adding medications

## 2018-12-06 ENCOUNTER — PATIENT OUTREACH (OUTPATIENT)
Dept: CARE COORDINATION | Facility: CLINIC | Age: 23
End: 2018-12-06

## 2018-12-06 NOTE — PROGRESS NOTES
Clinic Care Coordination Contact  [facility]/Voicemail    Referral Source: PCP  Clinical Data: Care Coordinator Outreach  Outreach attempted x 1.  Left message on voicemail with call back information and requested return call.  Plan: Care Coordinator will mail out care coordination introduction letter with care coordinator contact information and explanation of care coordination services at next outreach. Care Coordinator will try to reach patient again in 1-2 business days.    ADRIANA Martinez  Clinic Care Coordinator   [facility]   [phone]

## 2018-12-08 LAB
BACTERIA SPEC CULT: ABNORMAL
SPECIMEN SOURCE: ABNORMAL

## 2018-12-10 ENCOUNTER — TELEPHONE (OUTPATIENT)
Dept: CARE COORDINATION | Facility: CLINIC | Age: 23
End: 2018-12-10

## 2018-12-10 ENCOUNTER — PATIENT OUTREACH (OUTPATIENT)
Dept: CARE COORDINATION | Facility: CLINIC | Age: 23
End: 2018-12-10

## 2018-12-10 NOTE — LETTER
[place] CARE COORDINATION-[facility]   [address]  [place] [address]  December 10, 2018    Samara Oropeza  [address]    [place] [address]      Dear Samara,    I am a clinic care coordinator who works with EVI Cardona CNP at [facility]. I have been trying to reach you recently to introduce Clinic Care Coordination and to see if there was anything I could assist you with.  I wanted to introduce myself and provide you with my contact information so that you can call me with questions or concerns about your health care. Below is a description of clinic care coordination and how I can further assist you.     The clinic care coordinator is a registered nurse and/or  who understand the health care system. The goal of clinic care coordination is to help you manage your health and improve access to the [facility] system in the most efficient manner. The registered nurse can assist you in meeting your health care goals by providing education, coordinating services, and strengthening the communication among your providers. The  can assist you with financial, behavioral, psychosocial, chemical dependency, counseling, and/or psychiatric resources.    Please feel free to contact me at [phone], with any questions or concerns. We at [facility] are focused on providing you with the highest-quality healthcare experience possible and that all starts with you.     Sincerely,     Maeve Purcell    Enclosed: I have enclosed a copy of a 24 Hour Access Plan. This has helpful phone numbers for you to call when needed. Please keep this in an easy to access place to use as needed.

## 2018-12-10 NOTE — PROGRESS NOTES
Clinic Care Coordination Contact  [facility]/Voicemail    Referral Source: PCP- Financial Support: Food, Housing, Medication Affordability  and interested in applying for disability.  Clinical Data: Care Coordinator Outreach  Outreach attempted x 2.  Left message on voicemail with call back information and requested return call.  Plan: Care Coordinator mailed out care coordination introduction letter on 12/10/18. Care Coordinator will do no further outreaches at this time.      ADRIANA Martinez  Clinic Care Coordinator   [facility]   [phone]

## 2018-12-10 NOTE — LETTER
Health Care Home - Access Care Plan    About Me  Patient Name:  Samara Bah    YOB: 1995  Age:                             23 year old   Codie MRN:            [id number] Telephone Information:   Home Phone [phone]   Mobile [phone]       Address:    [address]  [address] [place] [address] Email address:  [email or web address]      Emergency Contact(s)  Name Relationship Lgl Grd Work Phone Home Phone Mobile Phone   1. MAGDA CLARKE* Mother Yes  [phone]   2. MALGORZATA BAH  Father   [phone]   3. HILLARY CLARKE Step parent   [phone]   4. BOZENA PERRY* Grandparent   [phone]             Health Maintenance: Routine Health maintenance Reviewed: Due/Overdue   Health Maintenance Due   Topic Date Due     DTAP/TDAP/TD IMMUNIZATION (1 - Tdap) 09/20/2001     PNEUMOVAX IMMUNIZATION 19-64 MEDIUM RISK (1 of 1 - PPSV23) 09/20/2013     INFLUENZA VACCINE (1) 09/01/2018     CHLAMYDIA SCREENING  10/24/2018       My Access Plan  Medical Emergency 914   Questions or concerns during clinic hours Primary Clinic Line, I will call the clinic directly: [facility] [phone]   24 Hour Appointment Line [phone] or  [phone]) (toll free)   24 Hour Nurse Line [phone] (toll free)   Questions or concerns outside clinic hours 24 Hour Appointment Line, I will call the after-hours on-call line:   [facility] [phone] or [phone]) (toll-free)   Preferred Urgent Care     Preferred Hospital     Preferred Pharmacy [facility] - [place], [place] - [address] [place] [address]     Behavioral Health Crisis Line The National Suicide Prevention Lifeline at 1-931.685.9297 or 911     My Care Team Members  Patient Care Team       Relationship Specialty Notifications Start End    Sonja Abreu APRN CNP PCP - General Nurse Practitioner   11/3/17     Phone: [phone] Fax: [phone]         [address] [place] [address] [facility] [address]    Jennifer Acevedo MD MD Orthopedics  7/16/13     Phone: [phone] Fax: [phone]         [address] [place] [address]    Lilliana Miramontes APRN CNP Nurse Practitioner Nurse Practitioner  5/11/15     Phone: [phone] Fax: [phone]         [address] [place], [place] [address] [address]    Cristy Russell, RN Nurse Coordinator Neurology Admissions 6/18/15     General Neurology    Phone: [phone] Pager: [phone] Fax: [phone]       Austen Marquez MD MD Rheumatology Admissions 9/16/15     Phone: [phone] Fax: [phone]         [address] [place] [address]    Umer Heaton MD MD Ophthalmology  1/11/16     Phone: [phone] Fax: [phone]         [address] [place] [address] [address] [facility] [address]    Suzy Harman MD MD Family Medicine - Sports Medicine  1/19/16     Phone: [phone] Fax: [phone]         [address] [place] [address]    Esau Elliott MD MD Dermatology  9/14/16     Phone: [phone] Fax: [phone]         [facility] [address]    Frederick Bender MD MD Physical Medicine and Rehab  4/4/17     Phone: [phone] Fax: [phone]         [address] [place] [address]    Vidya Bryson, RN Clinic Care Coordinator Physical Medicine and Rehab  4/4/17     Phone: [phone] Pager: [phone] Fax: [phone]        [facility] [address] [facility] [address] [facility] [address]    Marcelle Richardson, PT Physical Therapist Physical Medicine and Rehab  4/4/17     Phone: [phone] Fax: [phone]         [address] [place] [address] [address] [facility] [address]    Cata Hurst, NP Nurse Practitioner Nurse Practitioner Psych/Mental Health  6/27/17     Phone: [phone] Fax: [phone]         [place] [address] [address] [facility]  [facility] [address]    HUSSEIN Lynn MD MD Cardiology  2/27/18     Phone: [phone] Fax: [phone]         [address] [place] [address] [address] [facility] [address]           Current Medications and Allergies:  See printed Medication Report

## 2018-12-11 ENCOUNTER — OFFICE VISIT (OUTPATIENT)
Dept: PODIATRY | Facility: CLINIC | Age: 23
End: 2018-12-11
Payer: COMMERCIAL

## 2018-12-11 ENCOUNTER — TELEPHONE (OUTPATIENT)
Dept: PODIATRY | Facility: CLINIC | Age: 23
End: 2018-12-11

## 2018-12-11 ENCOUNTER — OFFICE VISIT (OUTPATIENT)
Dept: PSYCHOLOGY | Facility: CLINIC | Age: 23
End: 2018-12-11
Payer: COMMERCIAL

## 2018-12-11 VITALS
DIASTOLIC BLOOD PRESSURE: 70 MMHG | HEIGHT: 63 IN | WEIGHT: 154 LBS | SYSTOLIC BLOOD PRESSURE: 110 MMHG | BODY MASS INDEX: 27.29 KG/M2

## 2018-12-11 DIAGNOSIS — F33.1 MAJOR DEPRESSIVE DISORDER, RECURRENT EPISODE, MODERATE (H): Primary | ICD-10-CM

## 2018-12-11 DIAGNOSIS — G89.29 CHRONIC PAIN OF LEFT ANKLE: ICD-10-CM

## 2018-12-11 DIAGNOSIS — F41.1 GAD (GENERALIZED ANXIETY DISORDER): ICD-10-CM

## 2018-12-11 DIAGNOSIS — M25.572 CHRONIC PAIN OF LEFT ANKLE: ICD-10-CM

## 2018-12-11 DIAGNOSIS — M25.572 SINUS TARSI SYNDROME, LEFT: Primary | ICD-10-CM

## 2018-12-11 PROCEDURE — 99214 OFFICE O/P EST MOD 30 MIN: CPT | Mod: 25 | Performed by: PODIATRIST

## 2018-12-11 PROCEDURE — 20605 DRAIN/INJ JOINT/BURSA W/O US: CPT | Mod: LT | Performed by: PODIATRIST

## 2018-12-11 PROCEDURE — 90834 PSYTX W PT 45 MINUTES: CPT | Performed by: COUNSELOR

## 2018-12-11 RX ORDER — TRIAMCINOLONE ACETONIDE 40 MG/ML
40 INJECTION, SUSPENSION INTRA-ARTICULAR; INTRAMUSCULAR ONCE
Qty: 1 ML | Refills: 0 | Status: ON HOLD | OUTPATIENT
Start: 2018-12-11 | End: 2019-03-15

## 2018-12-11 ASSESSMENT — ANXIETY QUESTIONNAIRES
IF YOU CHECKED OFF ANY PROBLEMS ON THIS QUESTIONNAIRE, HOW DIFFICULT HAVE THESE PROBLEMS MADE IT FOR YOU TO DO YOUR WORK, TAKE CARE OF THINGS AT HOME, OR GET ALONG WITH OTHER PEOPLE: SOMEWHAT DIFFICULT
5. BEING SO RESTLESS THAT IT IS HARD TO SIT STILL: SEVERAL DAYS
2. NOT BEING ABLE TO STOP OR CONTROL WORRYING: SEVERAL DAYS
3. WORRYING TOO MUCH ABOUT DIFFERENT THINGS: MORE THAN HALF THE DAYS
GAD7 TOTAL SCORE: 9
6. BECOMING EASILY ANNOYED OR IRRITABLE: SEVERAL DAYS
7. FEELING AFRAID AS IF SOMETHING AWFUL MIGHT HAPPEN: NOT AT ALL
1. FEELING NERVOUS, ANXIOUS, OR ON EDGE: MORE THAN HALF THE DAYS

## 2018-12-11 ASSESSMENT — PATIENT HEALTH QUESTIONNAIRE - PHQ9
SUM OF ALL RESPONSES TO PHQ QUESTIONS 1-9: 13
5. POOR APPETITE OR OVEREATING: MORE THAN HALF THE DAYS

## 2018-12-11 ASSESSMENT — MIFFLIN-ST. JEOR: SCORE: 1409.73
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
## 2018-12-11 NOTE — PROGRESS NOTES
"Foot & Ankle Surgery   December 11, 2018    S:  Pt is seen today for evaluation of left ankle pain.  She was last seen middle 2017.  She's done PT, she's done the brace, she's done ALONSO/ENSAID.  She underwent a steroid injection into the ankle joint and states for 3-4 months, she was about 75% better and remaining pain levels were not as severe.  she feels unstable, although previous MRI indicated no lateral ligament pathology. She's also noticing pain at the lateral rearfoot.    Vitals:    12/11/18 0852   BP: 110/70   Weight: 69.9 kg (154 lb)   Height: 1.588 m (5' 2.5\")   '      ROS - Pos for CC.  Patient denies current nausea, vomiting, chills, fevers, belly pain, calf pain, chest pain or SOB.  Complete remainder of ROS it otherwise neg.      PE:  Gen:   No apparent distress  Eye:    Visual scanning without deficit  Ear:    Response to auditory stimuli wnl  Lung:    Non-labored breathing on RA noted  Abd:    NTND per patient report  Lymph:    Neg for pitting/non-pitting edema BLE  Vasc:    Pulses palpable, CFT minimally delayed  Neuro:    Light touch sensation intact to all sensory nerve distributions without paresthesias  Derm:    Neg for nodules, lesions or ulcerations  MSK:    Left ankle - very tender medial soft spot and anterior gutter.  Very tender over sinus tarsi as well.    Calf:    Neg for redness, swelling or tenderness      Imaging:  MRI left ankle 4/26/17 - IMPRESSION:  Mild strain of the distal soleus muscle. No actual muscle  or tendon tear is seen. No ligament pathology is demonstrated.    Assessment:  23 year old female with chronic left ankle pain from previous injury; sinus tarsi syndrome      Plan:  Discussed etiologies, anatomy and options  1.  Chronic left ankle pain  -she has done conservative therapies without much overall improvement  -significant improvement in pain with ankle injection, indicating likely intra-articular pathology  -advised proceeding with ankle arthroscopy, ligament " "stressing  -briefly discussed procedure  -surg agustina handout dispensed    2.  Sinus tarsi syndrome left lower extremity   -may be contributing other 25% of pain  -ALONSO/ENSAID vs tylenol prn based on pain  -injection at patient request, see procedure note  -discussed evacuation/debridement at time of ankle surgery    After obtaining written consent, the joint was identified and the skin was prepped with alcohol and betadine.  The joint was distracted and the needle was advance to the underlying sinus tarsi/posterior STJ facet.  1 1/2cc mixture of 2:1 kenalog 40:1% lidocaine plain was injected.  The patient tolerated the procedure without complication.  Risks that were discussed included possible joint/cartilage damage, infection, pigment change, steroid flare.        Follow up:  prn or sooner with acute issues    Job duties; time off work - not currently working  Smoking history - no  Vit D - n/a  Diabetic/A1c - neg  Hemoglobin - draw prior to surgery  Clot history - states she had a previous clot; elevated D-dimer but US 12/3/18 neg for clot.  Allergies to surgical implants/suture - neg  Allergies/issues with narcotics - \"they all make me throw up\".  Tolerated vicodin/percocet but does throw up.    Procedure(s):  1.  Left lower extremity ankle arthroscopy with possible ligament repair; sinus tarsi evacuation left lower extremity   Consent: above  Diagnosis:  Chronic left ankle pain  Equipment/Vendor: [employer] Arthrobrostrum set.          Body mass index is 27.72 kg/m .  Weight management plan: Patient was referred to their PCP to discuss a diet and exercise plan.         Andres Johnson DPM FACFAS FACFAOM  Podiatric Foot & Ankle Surgeon  [facility]  [phone]  "

## 2018-12-11 NOTE — TELEPHONE ENCOUNTER
Scheduled surgery.     Type of surgery: left ankle arthroscopy with possible ligament repair; left foot sinus tarsi evacuation  Location of surgery: [facility]  Date and time of surgery: 1/2/2019 @ 0925  Surgeon: Elizabeth  Pre-Op Appt Date: 12/12/18 Dr. Abreu  Post-Op Appt Date: 1/8/2019   Packet sent out: Yes  Pre-cert/Authorization completed:  Not Applicable  Date: 12.11.18        Jim Brennan Surgery Scheduler

## 2018-12-11 NOTE — PATIENT INSTRUCTIONS
"Thank you for choosing [facility]! We look forward to seeing you at your upcoming appointments.    DR. CRISOSTOMO'S CLINIC LOCATIONS:   MONDAY - [place] TUESDAY - [place]   [address] [facility]  [address]   [place] [address] [place] [address]   [phone]       THURSDAY AM - [place] THURSDAY PM - [place]   [address]   [place] [address] [place] [address]   [phone]       FRIDAY AM - [place] SET UP SURGERY: [phone]   [address] APPOINTMENTS: [phone]   [place] [address] BILLING QUESTIONS: [phone] FAX NUMBER: [phone]     FOOT & ANKLE SURGERY PLANNING CHECKLIST  If you have decided to have surgery, follow these 5 steps to get the procedure scheduled and to have the proper paperwork filled out. If you are unsure about surgery or would like to sit down and further discuss your issue and treatment options, please make an appointment.    1.  Pick the date that you would like to have surgery. Surgery is done on a Wednesday at [facility] [place]. Keep in mind that you will likely need at least 2 weeks off after the procedure for proper rest and healing.    2.  Call the surgery scheduling line at [phone] to get the procedure scheduled. Our  will also help you make your pre-operative physical with a primary doctor, your surgery consult appointment with me and your one week follow up after surgery for your first dressing change.    3.  If our surgery scheduler does not make your surgery consultation appointment with me then call to schedule that. When making the appointment, say \"I need to make a 30 minute surgical consult appointment\". It is recommended that you bring a spouse, family member or friend with you. There will be lots of information presented. It can be overwhelming and it is better to have someone there to help sort out the " details.    4. Contact your employer to request time off from work if needed. If there is going to be FMLA used, please have them fax the forms to [phone] at least one week prior to surgery.    * If you have any post-operative questions regarding your procedure, call our triage team at the [facility] at [phone] (option 2 > option 3).    Body Mass Index (BMI)  Many things can cause foot and ankle problems. Foot structure, activity level, foot mechanics and injuries are common causes of pain. One very important issue that often goes unmentioned, is body weight. Extra weight can cause increased stress on muscles, ligaments, bones and tendons. Sometimes just a few extra pounds is all it takes to put one over her/his threshold. Without reducing that stress, it can be difficult to alleviate pain. Some people are uncomfortable addressing this issue, but we feel it is important for you to think about it. As Foot &  Ankle specialists, our job is addressing the lower extremity problem and possible causes. Regarding extra body weight, we encourage patients to discuss diet and weight management plans with their primary care doctors. It is this team approach that gives you the best opportunity for pain relief and getting you back on your feet.

## 2018-12-12 ENCOUNTER — OFFICE VISIT (OUTPATIENT)
Dept: FAMILY MEDICINE | Facility: CLINIC | Age: 23
End: 2018-12-12
Payer: COMMERCIAL

## 2018-12-12 VITALS
OXYGEN SATURATION: 99 % | DIASTOLIC BLOOD PRESSURE: 80 MMHG | WEIGHT: 154.3 LBS | TEMPERATURE: 97.7 F | BODY MASS INDEX: 27.34 KG/M2 | SYSTOLIC BLOOD PRESSURE: 126 MMHG | HEART RATE: 114 BPM | HEIGHT: 63 IN

## 2018-12-12 DIAGNOSIS — G89.4 CHRONIC PAIN SYNDROME: ICD-10-CM

## 2018-12-12 DIAGNOSIS — E61.1 IRON DEFICIENCY ASSOCIATED WITH NONFAMILIAL RESTLESS LEGS SYNDROME: ICD-10-CM

## 2018-12-12 DIAGNOSIS — F33.1 MAJOR DEPRESSIVE DISORDER, RECURRENT EPISODE, MODERATE (H): ICD-10-CM

## 2018-12-12 DIAGNOSIS — R56.9 CONVULSIONS, UNSPECIFIED CONVULSION TYPE (H): ICD-10-CM

## 2018-12-12 DIAGNOSIS — F41.1 GENERALIZED ANXIETY DISORDER: ICD-10-CM

## 2018-12-12 DIAGNOSIS — K59.00 CONSTIPATION, UNSPECIFIED CONSTIPATION TYPE: ICD-10-CM

## 2018-12-12 DIAGNOSIS — F95.2 TOURETTE'S SYNDROME: ICD-10-CM

## 2018-12-12 DIAGNOSIS — G43.719 INTRACTABLE CHRONIC MIGRAINE WITHOUT AURA AND WITHOUT STATUS MIGRAINOSUS: ICD-10-CM

## 2018-12-12 DIAGNOSIS — K21.9 GASTROESOPHAGEAL REFLUX DISEASE WITHOUT ESOPHAGITIS: ICD-10-CM

## 2018-12-12 DIAGNOSIS — G89.29 CHRONIC ABDOMINAL PAIN: ICD-10-CM

## 2018-12-12 DIAGNOSIS — M79.7 FIBROMYALGIA: ICD-10-CM

## 2018-12-12 DIAGNOSIS — M06.09 RHEUMATOID ARTHRITIS OF MULTIPLE SITES WITHOUT RHEUMATOID FACTOR (H): ICD-10-CM

## 2018-12-12 DIAGNOSIS — Z01.818 PREOP GENERAL PHYSICAL EXAM: Primary | ICD-10-CM

## 2018-12-12 DIAGNOSIS — M35.2 BEHCET'S DISEASE (H): ICD-10-CM

## 2018-12-12 DIAGNOSIS — R10.9 CHRONIC ABDOMINAL PAIN: ICD-10-CM

## 2018-12-12 DIAGNOSIS — F43.10 PTSD (POST-TRAUMATIC STRESS DISORDER): ICD-10-CM

## 2018-12-12 DIAGNOSIS — G25.81 IRON DEFICIENCY ASSOCIATED WITH NONFAMILIAL RESTLESS LEGS SYNDROME: ICD-10-CM

## 2018-12-12 LAB
D DIMER PPP FEU-MCNC: 0.5 UG/ML FEU (ref 0–0.5)
ERYTHROCYTE [DISTWIDTH] IN BLOOD BY AUTOMATED COUNT: 13.7 % (ref 10–15)
HCT VFR BLD AUTO: 37.3 % (ref 35–47)
HGB BLD-MCNC: 12.2 G/DL (ref 11.7–15.7)
MCH RBC QN AUTO: 28.9 PG (ref 26.5–33)
MCHC RBC AUTO-ENTMCNC: 32.7 G/DL (ref 31.5–36.5)
MCV RBC AUTO: 88 FL (ref 78–100)
PLATELET # BLD AUTO: 236 10E9/L (ref 150–450)
RBC # BLD AUTO: 4.22 10E12/L (ref 3.8–5.2)
WBC # BLD AUTO: 4.5 10E9/L (ref 4–11)

## 2018-12-12 PROCEDURE — 99215 OFFICE O/P EST HI 40 MIN: CPT | Performed by: NURSE PRACTITIONER

## 2018-12-12 PROCEDURE — 36415 COLL VENOUS BLD VENIPUNCTURE: CPT | Performed by: NURSE PRACTITIONER

## 2018-12-12 PROCEDURE — 80053 COMPREHEN METABOLIC PANEL: CPT | Performed by: NURSE PRACTITIONER

## 2018-12-12 PROCEDURE — 85379 FIBRIN DEGRADATION QUANT: CPT | Performed by: NURSE PRACTITIONER

## 2018-12-12 PROCEDURE — 85027 COMPLETE CBC AUTOMATED: CPT | Performed by: NURSE PRACTITIONER

## 2018-12-12 RX ORDER — POLYETHYLENE GLYCOL 3350 17 G/17G
1 POWDER, FOR SOLUTION ORAL 3 TIMES DAILY
COMMUNITY

## 2018-12-12 RX ORDER — POLYETHYLENE GLYCOL 3350 17 G/17G
1 POWDER, FOR SOLUTION ORAL DAILY
Qty: 1530 G | Refills: 3 | Status: SHIPPED | OUTPATIENT
Start: 2018-12-12 | End: 2018-12-31

## 2018-12-12 ASSESSMENT — MIFFLIN-ST. JEOR: SCORE: 1411.09

## 2018-12-12 ASSESSMENT — ANXIETY QUESTIONNAIRES: GAD7 TOTAL SCORE: 9

## 2018-12-12 NOTE — PROGRESS NOTES
[address] [address]  [phone]  Dept: [phone]    PRE-OP EVALUATION:  Today's date: 2018    Samara Oropeza (: 1995) presents for pre-operative evaluation assessment as requested by Dr. Johnson  She requires evaluation and anesthesia risk assessment prior to undergoing surgery/procedure for treatment of left ankle.     Proposed Surgery/ Procedure: left lower extremity ankle arthroscopy with possible ligament repair, sinus tarsi evacuation of left lower extremity  Date of Surgery/ Procedure: 19  Time of Surgery/ Procedure: 9:45 am  Hospital/Surgical Facility: [facility]   Fax number for surgical facility: Available electronically   Primary Physician: Sonja Abreu  Type of Anesthesia Anticipated: General with saphinous and popliteal block    Patient has a Health Care Directive or Living Will:  NO    1. NO - Do you have a history of heart attack, stroke, stent, bypass or surgery on an artery in the head, neck, heart or legs?  2. YES - DO YOU EVER HAVE ANY PAIN OR DISCOMFORT IN YOUR CHEST?   3. NO - Do you have a history of  Heart Failure?  4. NO - Are you troubled by shortness of breath when: walking on the level, up a slight hill or at night?  5. NO - Do you currently have a cold, bronchitis or other respiratory infection?  6. NO - Do you have a cough, shortness of breath or wheezing?  7. YES - DO YOU SOMETIMES GET PAINS IN THE CALVES OF YOUR LEGS WHEN YOU WALK?   8. NO - Do you or anyone in your family have previous history of blood clots?  9. YES - DO YOU OR DOES ANYONE IN YOUR FAMILY HAVE A SERIOUS BLEEDING PROBLEM SUCH AS PROLONGED BLEEDING FOLLOWING SURGERIES OR CUTS? Personal history  10. YES - HAVE YOU EVER HAD PROBLEMS WITH ANEMIA OR BEEN TOLD TO TAKE IRON PILLS?   11. NO - Have you had any abnormal blood loss such as black, tarry or bloody stools, or abnormal vaginal bleeding?  12. NO - Have you  ever had a blood transfusion?  13. YES - HAVE YOU OR ANY OF YOUR RELATIVES EVER HAD PROBLEMS WITH ANESTHESIA? Personal history   14. YES - DO YOU HAVE SLEEP APNEA, EXCESSIVE SNORING OR DAYTIME DROWSINESS? Daytime drowsiness   15. NO - Do you have any prosthetic heart valves?  16. NO - Do you have prosthetic joints?  17. NO - Is there any chance that you may be pregnant?      HPI:     HPI related to upcoming procedure: Fell two years ago, injuring lower left leg. Will be having exploratory surgery to recure presence of scar tissue.    ANEMIA - Patient has a recent history of moderate-severe anemia, which has not been symptomatic. Work up to date has revealed iron deficiency. Treatment has been iron infusion.                                                                                                                                            .  DEPRESSION - Patient has a long history of Depression of moderate severity requiring medication for control with recent symptoms being stable..Current symptoms of depression include depressed mood.                                                                                                                                                                                    .    MEDICAL HISTORY:     Patient Active Problem List    Diagnosis Date Noted     Aphthous ulcer 11/13/2018     Priority: Medium     Overview:   Created by Conversion       Cough 11/13/2018     Priority: Medium     Overview:   Created by Conversion       Dysthymic disorder 11/13/2018     Priority: Medium     Overview:   Created by Conversion       Displacement of lumbar intervertebral disc without myelopathy 11/13/2018     Priority: Medium     Overview:   Created by Conversion       Iron deficiency associated with nonfamilial restless legs syndrome 11/13/2018     Priority: Medium     Other specified symptom associated with female genital organs 11/13/2018     Priority: Medium     Overview:   Created by  Conversion       Enthesopathy of hip region 11/13/2018     Priority: Medium     Overview:   Created by Conversion       Tourette's syndrome 11/13/2018     Priority: Medium     Overview:   Created by Conversion       Somatic symptom disorder 06/01/2018     Priority: Medium     Pelvic floor weakness 04/25/2018     Priority: Medium     Spells of decreased attentiveness 12/19/2017     Priority: Medium     Mobile cecum 11/09/2017     Priority: Medium     Cecum noted in Right lower quadrant on 4/17 CT scan, and in Left upper Quadrant on CT on 11/2017.       Vitamin D deficiency 10/11/2017     Priority: Medium     How low, unknown/not found. On D when tested at 27. Starting cholecalciferol October 2017. Needs recheck.       Convulsions, unspecified convulsion type (H) 10/03/2017     Priority: Medium     Transient alteration of awareness 10/03/2017     Priority: Medium     Chronic pain syndrome 07/27/2017     Priority: Medium     Major depressive disorder, recurrent episode, moderate (H) 06/27/2017     Priority: Medium     Cervical pain 05/02/2017     Priority: Medium     Acute left ankle pain 03/31/2017     Priority: Medium     Cervical dystonia 03/28/2017     Priority: Medium     PTSD (post-traumatic stress disorder) 01/17/2017     Priority: Medium     Patellofemoral instability 10/20/2016     Priority: Medium     Fibromyalgia 08/04/2016     Priority: Medium     Rheumatoid arthritis of multiple sites without rheumatoid factor (H) 08/04/2016     Priority: Medium     Raynaud's disease without gangrene 08/04/2016     Priority: Medium     Chronic abdominal pain 08/04/2016     Priority: Medium     Palpitations 01/12/2016     Priority: Medium     On colchicine therapy 10/30/2015     Priority: Medium     Spell of shaking 05/06/2015     Priority: Medium     Migraine 02/04/2015     Priority: Medium     Problem list name updated by automated process. Provider to review       Behcet's disease (H) 12/10/2014     Priority: Medium      Headaches due to old head injury 11/12/2013     Priority: Medium     Milk protein intolerance 10/11/2013     Priority: Medium     Intestinal malabsorption 10/11/2013     Priority: Medium     Concussion 02/13/2013     Priority: Medium     Jan 2013, with prolonged recovery- followed by sports med         Knee pain 01/03/2013     Priority: Medium     Generalized anxiety disorder 06/25/2009     Priority: Medium     Overview:   Created by Conversion       Anxiety state 06/25/2009     Priority: Medium     Overview:   Created by Conversion    Replacement Utility updated for latest IMO load       Tics - Tourette syndrome 05/18/2009     Priority: Medium     Followed by psychotherapy. Symptoms well managed. Originally diagnosed at [facility]. (Dr. Simpson)           IBS (irritable bowel syndrome) 05/18/2009     Priority: Medium     Allergic rhinitis 05/18/2009     Priority: Medium     GERD (gastroesophageal reflux disease) 01/10/2008     Priority: Medium     Gastroparesis 1994     Priority: Medium      Past Medical History:   Diagnosis Date     Anxiety      Arthritis      Behcet's disease (H)      Cervical adenitis May 2010     Chronic abdominal pain      Constipation, chronic 1994     Fibromyalgia      Gastro-oesophageal reflux disease      Gastroparesis      Migraines      Neuromuscular disorder (H)     fibramyalgia     Palpitations      Seizure (H)      Seizures (H)     unknown etiology     Syncope      Tourette's      Past Surgical History:   Procedure Laterality Date     ARTHROSCOPY KNEE WITH PATELLAR REALIGNMENT  7/25/2013    Procedure: ARTHROSCOPY KNEE WITH PATELLAR REALIGNMENT;  Left Knee Arthroscopy, Medial Patellofemoral Ligament Reconstruction with Allograft  ;  Surgeon: Jennifer Acevedo MD;  [facility]: [facility]     COLONOSCOPY  2015     DENTAL SURGERY  1996    Teeth removal     ENDOSCOPY UPPER, COLONOSCOPY, COMBINED  2005     HC ESOPH/GAS REFLUX TEST W NASAL IMPED >1 HR N/A 2/15/2017    Procedure:  ESOPHAGEAL IMPEDENCE FUNCTION TEST WITH 24 HOUR PH GREATER THAN 1 HOUR;  Surgeon: Timothy Matta MD;  Location:  GI     Current Outpatient Medications   Medication Sig Dispense Refill     albuterol (PROAIR HFA/PROVENTIL HFA/VENTOLIN HFA) 108 (90 BASE) MCG/ACT Inhaler Inhale 2 puffs into the lungs every 6 hours as needed for shortness of breath / dyspnea or wheezing 1 Inhaler 1     amitriptyline (ELAVIL) 50 MG tablet Take 1 tablet (50 mg) by mouth At Bedtime 30 tablet 11     Apremilast (OTEZLA) 10 & 20 & 30 MG TBPK Take by mouth according to the instructions on the packet. Hold for signs of infection,any GI upset, weight loss or depression concerns, and seek medical attention. 1 each 0     apremilast (OTEZLA) 30 MG tablet Take 1 tablet (30 mg) by mouth 2 times daily 180 tablet 0     artificial tears OINT ophthalmic ointment 0.5 inch strip each eye at night 1 Tube 11     aspirin (ASPIRIN CHILDRENS) 81 MG chewable tablet Take 81 mg by mouth as needed        aspirin-acetaminophen-caffeine (EXCEDRIN MIGRAINE) 250-250-65 MG per tablet Take 1 tablet by mouth every 6 hours as needed for headaches       benzocaine (TOPICALE XTRA) 20 % GEL Apply as needed locally to mouth or nasal ulcers for pain; 4 times daily as needed 30 g 1     betamethasone valerate (VALISONE) 0.1 % cream Apply topically 2 times daily       botulinum toxin type A (BOTOX) 100 UNITS injection Inject 200 Units into the muscle every 3 months 200 Units 3     clobetasol (TEMOVATE) 0.05 % cream Apply topically 2 times daily 60 g 0     colchicine (COLCYRS) 0.6 MG tablet Take 0.6 mg in AM and 0.6mg in PM every day 180 tablet 0     cyproheptadine (PERIACTIN) 4 MG tablet Take 1 tablet (4 mg) by mouth At Bedtime For nightmares 30 tablet 2     diclofenac (VOLTAREN) 1 % GEL topical gel Apply 2-4 grams to affected area(s) up to 4 times per day as needed. This is an anti-inflammatory medication. 100 g 4     dicyclomine (BENTYL) 20 MG tablet Take 1 tablet (20  mg) by mouth 4 times daily as needed 120 tablet 3     diltiazem 2% in PLO cream, FV COMPOUNDED, 2% GEL Apply small pea size amount three times daily to anus until pain is gone. 30 g 1     diphenhydrAMINE (BENADRYL) 25 MG tablet Take 1 tablet (25 mg) by mouth every 8 hours as needed for allergies 30 tablet 0     EPINEPHrine (EPIPEN 2-EAMON) 0.3 MG/0.3ML injection Inject 0.3 mLs (0.3 mg) into the muscle as needed for anaphylaxis 0.6 mL 3     guanFACINE (TENEX) 1 MG tablet Take 3 tablets (3 mg) by mouth twice daily in the morning and evening. 180 tablet 3     hydrocortisone 1 % CREA cream Place rectally 2 times daily as needed for itching 28.35 g 1     hydrOXYzine (ATARAX) 25 MG tablet Take 1-2 tablets (25-50 mg) by mouth every 6 hours as needed for anxiety 90 tablet 2     hyoscyamine (ANASPAZ/LEVSIN) 0.125 MG tablet Take 1-2 tablets (125-250 mcg) by mouth every 4 hours as needed for cramping 40 tablet 1     hypromellose (GENTEAL) 0.3 % SOLN 1 drop every hour as needed for dry eyes        Lactase (LACTAID PO) Take by mouth daily       lactulose 20 GM/30ML SOLN Take 30 mLs by mouth 3 times daily as needed (for constipation) 300 mL 3     lidocaine (XYLOCAINE) 2 % jelly Apply 1 Tube topically daily Reported on 4/21/2017       linaclotide (LINZESS) 145 MCG capsule Take 1 capsule (145 mcg) by mouth every morning (before breakfast) 90 capsule 1     Magic Mouthwash (FV std formula) lidocaine visc 2% 2.5mL/5mL & maalox/mylanta w/ simeth 2.5mL/5mL & diphenhydrAMINE 5mg/5mL Swish and swallow 10 mLs in mouth every 6 hours as needed for mouth sores 1 Bottle 1     Magnesium Aspartate HCl 1230 MG PACK Take 1 packet by mouth daily as needed 30 each 3     nitroFURantoin macrocrystal-monohydrate (MACROBID) 100 MG capsule Take 1 capsule (100 mg) by mouth At Bedtime 90 capsule 1     norgestimate-ethinyl estradiol (SPRINTEC 28) 0.25-35 MG-MCG tablet Take 1 tablet by mouth daily 84 tablet 4     omeprazole (PRILOSEC) 40 MG capsule Take 1  capsule (40 mg) by mouth daily 90 capsule 3     OnabotulinumtoxinA (BOTOX IJ) Inject 175 Units into the muscle Lot: [id number]  Exp: 01/2021       OnabotulinumtoxinA (BOTOX IJ) Inject 175 Units into the muscle once Lot # /C3 with Expiration Date:  11/2020       OnabotulinumtoxinA (BOTOX IJ) Inject 165 Units as directed once Lot#: /C3  Exp: 10/2020       OnabotulinumtoxinA (BOTOX IJ) Inject 165 Units as directed once Lot # /C3   Exp:  10/2020       OnabotulinumtoxinA (BOTOX IJ) Inject 165 Units into the muscle once Lot /C3  Exp 06/2020       ondansetron (ZOFRAN) 8 MG tablet Take 1 tablet (8 mg) by mouth every 8 hours as needed for nausea 60 tablet 1     phenazopyridine HCl (AZO URINARY PAIN RELIEF) tablet Take 2 tablets (190 mg) by mouth 3 times daily 24 tablet 1     polyethylene glycol (MIRALAX/GLYCOLAX) powder Take 1 capful by mouth 3 times daily       predniSONE (DELTASONE) 20 MG tablet Take 1 tablet (20 mg) by mouth 2 times daily 10 tablet 0     sennosides (SENOKOT) 8.6 MG tablet Take 1 tablet by mouth daily       sucralfate (CARAFATE) 1 GM/10ML suspension Take 10 mLs (1 g) by mouth 4 times daily 1200 mL 2     triamcinolone (KENALOG) 0.1 % cream Apply sparingly to lesions twice daily as needed 80 g 1     OTC products: None, except as noted above    Allergies   Allergen Reactions     Amoxil [Penicillins] Rash     Dad unsure of reaction.     Bee Venom Anaphylaxis     Contrast Dye Rash     Contrast Media Ready-[facility] [place], 04/09/2014.; Contrast Media Ready-[facility] [place], 04/09/2014.  NOTE: this is a contrast media oral with iodine. Premedicate with methylpred standard for IV contrast, request barium contrast for oral contrast.     Kiwi Swelling     Orange Fruit [Citrus] Anaphylaxis     Pineapple Anaphylaxis, Difficulty breathing and Rash     Reglan [Metoclopramide] Other (See Comments)     IV dose only, in ER, rapid heart rate.     Ace Inhibitors      Difficulty in breathing and GI upset     Amitiza  [Lubiprostone] Nausea and Vomiting     Amoxicillin-Pot Clavulanate      Latex      Midazolam Unknown     Other reaction(s): Unknown  parent states that when pt takes this medication, she wakes up being very violent .  parent states that when pt takes this medication, she wakes up being very violent .     No Clinical Screening - See Comments      Bleech/ chest tightness, itchy throat, swollen tongue, hives     Versed      Coming out of pelvic exam at age of 5, was kicking and screaming when coming out of the versed.     Adhesive Tape Rash     Azithromycin Hives and Rash     Cephalexin Itching and Rash     Itchy mouth  Itchy mouth     Keflex [Cephalexin-Fd&C Yellow #6] Hives      Latex Allergy: NO    Social History     Tobacco Use     Smoking status: Never Smoker     Smokeless tobacco: Never Used   Substance Use Topics     Alcohol use: Yes     Alcohol/week: 0.6 oz     Types: 1 Standard drinks or equivalent per week     Comment: rarely     History   Drug Use No       REVIEW OF SYSTEMS:   CONSTITUTIONAL: NEGATIVE for fever, chills, change in weight  INTEGUMENTARY/SKIN: NEGATIVE for worrisome rashes, moles or lesions  EYES: NEGATIVE for vision changes or irritation  ENT/MOUTH: NEGATIVE for ear, mouth and throat problems  RESP: NEGATIVE for significant cough or SOB  BREAST: NEGATIVE for masses, tenderness or discharge  CV: NEGATIVE for chest pain, palpitations or peripheral edema  GI: NEGATIVE for nausea, abdominal pain, heartburn, or change in bowel habits  : NEGATIVE for frequency, dysuria, or hematuria  MUSCULOSKELETAL:POSITIVE  for arthralgias  and myalgia  NEURO: NEGATIVE for weakness, dizziness or paresthesias  ENDOCRINE: NEGATIVE for temperature intolerance, skin/hair changes  HEME: NEGATIVE for bleeding problems  PSYCHIATRIC: NEGATIVE for changes in mood or affect    EXAM:   /80 (BP Location: Left arm, Patient Position: Sitting, Cuff Size: Adult Regular)   Pulse 114   Temp 97.7  F (36.5  C) (Oral)   Ht  "1.588 m (5' 2.5\")   Wt 70 kg (154 lb 4.8 oz)   LMP 11/30/2018   SpO2 99%   BMI 27.77 kg/m      GENERAL APPEARANCE: healthy, alert and no distress     EYES: EOMI, PERRL     HENT: ear canals and TM's normal and nose and mouth without ulcers or lesions     NECK: no adenopathy, no asymmetry, masses, or scars and thyroid normal to palpation     RESP: lungs clear to auscultation - no rales, rhonchi or wheezes     CV: regular rates and rhythm, normal S1 S2, no S3 or S4 and no murmur, click or rub     ABDOMEN:  soft, nontender, no HSM or masses and bowel sounds normal     MS: extremities normal- no gross deformities noted, no evidence of inflammation in joints, FROM in all extremities.     SKIN: no suspicious lesions or rashes     NEURO: Normal strength and tone, sensory exam grossly normal, mentation intact and speech normal     PSYCH: mentation appears normal. and affect normal/bright     LYMPHATICS: No cervical adenopathy    DIAGNOSTICS:   EKG: Not indicated due to non-vascular surgery and low risk of event (age <65 and without cardiac risk factors)    Recent Labs   Lab Test 10/30/18  1606 06/01/18  1208 04/18/18  1724  11/06/16  1341  05/06/15  0220   HGB 12.7 11.9 11.7   < > 13.4   < > 12.2    257 215   < > 200   < > 193   INR  --   --   --   --  0.99  --  1.03     --  142   < > 142   < > 143   POTASSIUM 4.0  --  3.9   < > 3.6   < > 3.8   CR 0.79  --  0.77   < > 0.78   < > 0.66    < > = values in this interval not displayed.        IMPRESSION:   Reason for surgery/procedure: Left lower extremity ankle arthroscopy with possible ligament repair, sinus tarsi evacuation of left lower extremity  Diagnosis/reason for consult: Chronic Left Ankle Pain    The proposed surgical procedure is considered INTERMEDIATE risk.    REVISED CARDIAC RISK INDEX  The patient has the following serious cardiovascular risks for perioperative complications such as (MI, PE, VFib and 3  AV Block):  No serious cardiac " risks  INTERPRETATION: 0 risks: Class I (very low risk - 0.4% complication rate)    The patient has the following additional risks for perioperative complications:  No identified additional risks      ICD-10-CM    1. Preop general physical exam Z01.818    2. Fibromyalgia M79.7    3. Intractable chronic migraine without aura and without status migrainosus G43.719    4. Chronic abdominal pain R10.9     G89.29    5. Chronic pain syndrome G89.4    6. Convulsions, unspecified convulsion type (H) R56.9    7. Tourette's syndrome F95.2    8. Iron deficiency associated with nonfamilial restless legs syndrome E61.1     G25.81    9. Gastroesophageal reflux disease without esophagitis K21.9    10. Rheumatoid arthritis of multiple sites without rheumatoid factor (H) M06.09    11. Generalized anxiety disorder F41.1    12. PTSD (post-traumatic stress disorder) F43.10    13. Major depressive disorder, recurrent episode, moderate (H) F33.1    14. Behcet's disease (H) M35.2        RECOMMENDATIONS:     --Consult hospital rounder / IM to assist post-op medical management    --Patient is to take all scheduled medications on the day of surgery EXCEPT for modifications listed below.  -Do not take hydroxyzine morning of surgery  -Do not use Versed for surgical procedure given history of agitation with Versed  - Please use ultrasound-guided IV insertion given history of difficulty finding veins  -Consult anesthesiologist - Knee Surgery 7/25/2013, patient had a history of slow awakening from anesthesia  APPROVAL GIVEN to proceed with proposed procedure, without further diagnostic evaluation       Signed Electronically by: EVI Cardona CNP    Copy of this evaluation report is provided to requesting physician.    [facility] Preop Guidelines    Revised Cardiac Risk Index

## 2018-12-13 LAB
ALBUMIN SERPL-MCNC: 3.7 G/DL (ref 3.4–5)
ALP SERPL-CCNC: 55 U/L (ref 40–150)
ALT SERPL W P-5'-P-CCNC: 33 U/L (ref 0–50)
ANION GAP SERPL CALCULATED.3IONS-SCNC: 8 MMOL/L (ref 3–14)
AST SERPL W P-5'-P-CCNC: 34 U/L (ref 0–45)
BILIRUB SERPL-MCNC: 0.3 MG/DL (ref 0.2–1.3)
BUN SERPL-MCNC: 8 MG/DL (ref 7–30)
CALCIUM SERPL-MCNC: 9.4 MG/DL (ref 8.5–10.1)
CHLORIDE SERPL-SCNC: 105 MMOL/L (ref 94–109)
CO2 SERPL-SCNC: 24 MMOL/L (ref 20–32)
CREAT SERPL-MCNC: 0.67 MG/DL (ref 0.52–1.04)
GFR SERPL CREATININE-BSD FRML MDRD: >90 ML/MIN/1.7M2
GLUCOSE SERPL-MCNC: 76 MG/DL (ref 70–99)
POTASSIUM SERPL-SCNC: 3.9 MMOL/L (ref 3.4–5.3)
PROT SERPL-MCNC: 6.9 G/DL (ref 6.8–8.8)
SODIUM SERPL-SCNC: 137 MMOL/L (ref 133–144)

## 2018-12-18 ENCOUNTER — OFFICE VISIT (OUTPATIENT)
Dept: PODIATRY | Facility: CLINIC | Age: 23
End: 2018-12-18
Payer: COMMERCIAL

## 2018-12-18 VITALS
SYSTOLIC BLOOD PRESSURE: 122 MMHG | HEIGHT: 63 IN | BODY MASS INDEX: 27.34 KG/M2 | WEIGHT: 154.3 LBS | DIASTOLIC BLOOD PRESSURE: 70 MMHG

## 2018-12-18 DIAGNOSIS — M25.572 SINUS TARSI SYNDROME, LEFT: ICD-10-CM

## 2018-12-18 DIAGNOSIS — Z98.890 S/P FOOT SURGERY, LEFT: Primary | ICD-10-CM

## 2018-12-18 DIAGNOSIS — M25.572 CHRONIC PAIN OF LEFT ANKLE: ICD-10-CM

## 2018-12-18 DIAGNOSIS — G89.29 CHRONIC PAIN OF LEFT ANKLE: ICD-10-CM

## 2018-12-18 PROCEDURE — 99214 OFFICE O/P EST MOD 30 MIN: CPT | Performed by: PODIATRIST

## 2018-12-18 ASSESSMENT — MIFFLIN-ST. JEOR: SCORE: 1411.09

## 2018-12-18 NOTE — PATIENT INSTRUCTIONS
Thank you for choosing [facility] / Foot [facility] Ankle Surgery!    DR. CRISOSTOMO'S CLINIC LOCATIONS:   MONDAY - [place] TUESDAY - [place]   [address] [facility]  [address]   [place] [address] [place] [address]   [phone]       THURSDAY AM - [place] THURSDAY PM - [place]   [address]   [place] [address] [place], [place] [id number] [phone]       FRIDAY AM - [facility] SET UP SURGERY: [phone] [address] APPOINTMENTS: [phone]   [place] [address] BILLING QUESTIONS: [phone] FAX NUMBER: [phone]     FOOT & ANKLE SURGICAL RISKS  Undergoing surgical procedure involves a certain amount of risks. Risks of complications vary, depending on the complexity of the surgery and how you take care of the surgical area during the healing process. Complications can range from minor infection to death. Some complications are temporary while others will be permanent. Your surgeon weighs the risk of complications versus the potential benefit of undergoing the procedure. You need to consider your tolerance for unexpected problems as you decide whether to undergo surgery.    Foot and ankle surgery involves cutting skin, bone, ligaments, blood vessels, and joints. These structures heal well but not without consequence. Any break in the skin can lead to infection. A deep infection can involve bone or joints, which can be devastating. Deep infection can lead to further surgeries such as amputation or could spread to other parts of the body. Most infections are minor and easily treated with oral antibiotics. Infections are often times from bacteria already present on your skin. Proper care of the surgical site is an essential component of avoiding infection. Do not get the bandage wet and take proper care of external pins to avoid these complications.    Joint stiffness is inherent to any foot or ankle surgery.  Joint surgery is a major component of reconstructive foot and ankle procedures. The ligaments and tissue around the joint are released for exposure and/or correction of alignment. Scar tissue limits joint mobility. This can lead to hypersensitivity to touch, pain, problems wearing shoes, and need for revision surgery.    Bones do not always heal after surgery. Poor healing after a bone cut of joint fusion can lead to an extended period of casting, bone stimulators, or repeat surgery. A surgical nonunion is when bones do not heal properly. Smoking will almost always increase your risks of having postoperative complications. So if you smoke, quit now.    Bone grafting is sometimes necessary during the original or repeat surgery. Bone is sometimes taken from other parts of the body, freeze-dried cadaveric bone, or synthetic bone grafts may be used as needed.    BLOOD CLOT PREVENTION & EDUCATION  Foot and ankle surgery can lead to blood clots in the large veins of your legs. This is called a deep venous thrombosis or DVT. A DVT can be life threatening if a portion of the clot breaks away and travels to the lungs. A clot in the lungs is called a pulmonary embolism or PE.    Your risk of developing a DVT is dependent on many factors. Risk factors associated with surgery include the type of surgery you are having (foot and ankle surgery is considered a much lower risk that knee, hip, or abdominal surgery), how long you are in a cast/boot, restricted ambulation, inability to move the ankle, and if you are hospitalized after surgery.    A number of medical conditions also increase your risk of DVT, including diabetes, use of estrogen medications such as birth control pills or postmenopausal medications, obesity, pregnancy, heart failure, cancer, etc.    Other risk factors include heavy smoking, advanced age (over 60 years old, but even those over 40 have increased risk), family of personal history of blood clots or clotting  disorders. Symptoms of a DVT in the leg may include swelling, tenderness, a warm feeling or redness. A DVT can occur in both legs even if only one side is being treated. If you have these symptoms, call your doctor immediately.    Symptoms of a PE include chest pain, shortness of breath or the need to breath rapidly that is not associated with exercise, difficulty breathing, rapid heart rate, a feeling of passing out, and coughing or coughing up blood. A PE is an emergency so you need to be evaluated in the ER immediately if any of these symptoms occur. You could die from a PE. Call 911 right away. PE and DVT can occur without any symptoms in the leg and chest.    Prevention of DVT and PE is important. Your doctor may apply various types of compression to your legs before and after surgery. In addition, high risk patients may be place on a short course of blood thinning medication after surgery.    You can reduce your risk for DVT after surgery by getting up and moving/crawling/crutching around the house once or twice each hour while awake in the first few weeks. Seated range of motion exercises of your ankle and leg may also help. Moving your legs keeps blood flowing through your leg veins and reduced any pooling of blood that may clot. Be sure to follow your doctor's instructions on elevation and weight bearing restrictions.      SURGICAL DRESSING  Your surgical dressing is a sterile dressing and should be left in place until removed by your doctor or their assistant at your first postop visit in clinic. Keep the dressing dry by covering it with a plastic bag during showers, taking baths with your surgical foot hanging outside of the tub, or by sponge bathing. If the dressing does become wet or dirty, call the clinic as it most likely needs to be changed. Do not change it yourself unless told otherwise by your surgeon. The safest plan is to wait to shower for three days after surgery. So take a long shower the  morning of surgery.    Do not wear regular shoes with a surgical bandage and/or external pins in your foot. Wear loose fitting clothing that will easily slide over the dressing. Do not cover your surgical foot with blankets as it can contaminate the dressing. Also, remember to keep it away from your pets as they may try to chew on it.    If your surgeon places external pins in your foot, you must keep your foot dry until the pins are removed at six to eight weeks after surgery. Pins should be covered for protection but still examine the pin sites for loosening, movement, infection, or drainage whenever possible. Do not apply ointment on the pin sites and never push a loose pin back into place.    PRESURGICAL MEDICATION RECOMMENDATIONS  Certain prescription, over-the-counter and herbal medications interfere with healing after an operation. The main concern related to medications that increase bleeding at the surgical site. Excess blood under the incision results in poor wound healing, excess pain, increased scarring, and a higher risk of infection.    Some medications slow the healing process of bone. Medications can also interfere with anesthesia drugs that keep you asleep during the operation. It is important to ensure that these medications are out of your system prior to the operation. The list below details a number of medications that are of concern. Pay special attention to how long you should avoid these medications prior to surgery. Please note that this list is not complete. You should ask your surgeon, pharmacist, or primary care physician if you are uncertain about other medications. Any herbal supplement not listed should be discontinued at least one week prior to surgery.    Aspirin: stop one week prior and may restart the day after surgery. This medication promotes bleeding.  Motrin/Ibuprofen/Aleve/Advil/NSAIDS: stop one week prior to surgery. These medications promote bleeding and may delay bone  healing. Avoid these medications at least six weeks postop.  Coumadin: your primary care doctor will manage your coumadin in relation to surgery.  Enbrel: stop two weeks prior and may restart two weeks after. It may affect soft tissue healing and increase infection risk.  Remicade: stop eight to twelve weeks prior and may restart two weeks after. It can affect soft tissue healing and increase infection risk.  Humera: stop four weeks prior and may restart two weeks after. It can affect soft tissue healing and increase infection risk.  Methotrexate: stop taking on dose prior to surgery. It may be restarted when the wound is healing well.  Kava: stop at least one day prior and may restart one day after. It can cause increased sedative effects of anesthetics.  Ephedra (ma baldwin): stop at least one day prior and may restart one day after. It can increase risk of heart attack or stroke and bleeding at the surgical site.  Ernesto's Wort: stop at least five days prior and may restart one day after. It can diminish the effects of medications given during surgery.  Ginseng: stop at least one week prior and may restart one day after. It lowers blood sugar and can increase bleeding at the surgical site.  Ginkgo: stop 36 hours prior and may restart one day after. It can increase bleeding at the surgical site.  Garlic: stop at least one week prior and may restart one day after.  Valerian: slowly decrease the dose over a few weeks prior to avoid withdrawal symptoms. It can increase sedative effects of anesthetics.  Echinacea: no stop/start date. It can be associated with allergic reactions and suppression of your immune system.  Vitamin E/Omgea-3/Fish Oil/Flax/Glucosamine/Chondroitin: stop two weeks prior and may restart one day after. They can increase bleeding at the surgical site.      TIPS FOR SUCCESSFUL POST-OP HEALING  How you care for your surgical site is critically important to achieve successful results. Avoidance of  injury, infection, excess swelling, scar tissue, and stiffness are completely dependent on the care you provide over the next six weeks after surgery.    Your foot requires significant rest and elevation. Sitting for long hours with your foot elevated, however, will create its own problems. Expect muscle aches, back pain, cramps etc. Optimal posture, lumbar support, back exercises, ice, and heat may help with your new aches and pains. DO not apply a heating pad to your foot or leg, as this can worsen pain or swelling. Instead apply ice packs behind the involved knee. Do not apply it directly to the skin.    Narcotic pain medications and inactivity may also cause constipation. Limiting the use of these narcotics will help minimize this problem. The pain medication will not completely alleviate your pain. The purpose of pain pills is to take the edge off and help you get through the first few days. You can substitute extra strength Tylenol if pain is milder. Do not take Tylenol and prescription pain pills at the same time. Do not take more than 4,000mg of Tylenol in 24 hours. You can also minimize constipation by drinking plenty of water, eating lots of fruits and veggies, and taking the appropriate amount of Metamucil or other fiber supplements. These measures should be continued while on narcotic pain medications and if you remain inactive.    Showering can be a major challenge after surgery. Your incision requires about three days to become sealed from water. Your bandage should not get wet or removed. Attempt to avoid showing for three days after surgery and try a sponge bath instead. It can be dangerous showering while standing on only one foot so be careful. Consider borrowing a shower chair. If the bandage does get wet, call us immediately for a dressing change as this can slow the healing process or cause infection.    External pins need to be kept dry without ointment or moisture. Keep them covered at all  times. Protect them from clothing, blankets, and pets.  Any change or movement of the pins deserves a call to clinic. A loose pin will need to be removed. Never push them back into your foot.    Stiffness will develop after any operation due to scarring. The scar tissue begins to form immediately after the surgery. Inactivity can cause excess stiffness and may lead to blood clots, scar tissue, and adhesions.        SCAR CARE  Scarring is an unfortunate but unavoidable part of surgery. Every person scars differently and there is no way to predict how an individual's scar will look. Once the sutures are removed, there are a few things you can do to help minimize the scar tissue formation.    As soon as the skin is incised during surgery, the body is taking steps to prepare for healing. After about three days, the body has sent cells to the incision to begin the healing process. These cells, called fibroblasts, make collagen, a protein in the skin that helps provide strength. Once the skin has been sufficiently strengthened, the sutures are removed. Over the next year, the body synthesizes new collagen and breaks down old collagen to help achieve a strong scar that allows the foot and ankle to function appropriately. This is where patients can help the appearance of the scare, as it will change over the next year.    1. Do not expose the scar to the sun for one year.  2. Wear shoes/socks or cover your scar with zinc oxide  3. Any sun exposure can permanently darken the scar  4. Massage the scar 2-3 times a day to break down the collagen  5. Apply lotion/Vitamin E on the scar using some pressure  6. Try over the counter products like Mederma/Scar Zone    SHOWERING BEFORE SURGERY  You must wash with the soap twice before coming to the hospital for your surgery. This will decrease bacteria (germs) on your skin. It will also help reduce your chance of infection (illness caused by germs) after surgery.  Read the directions  and safety tips on the bottle of soap. Wash once the evening before surgery and once the morning of surgery. Use 4 ounces of soap each time. When showering, it is best to use 2 fresh washcloths and a fresh towel.   Items you will need for showerin newly washed washcloths    2 newly washed towels    8 ounces of soap  FOLLOW THESE INSTRUCTIONS:  The evening before surgery   1. Shower or bathe as you normally would, and use your regular soap and a clean washcloth. Give special attention to places where your incision (surgical cut) or catheters will be. This includes your groin area. Rinse well. You may wash your hair with your regular shampoo.  2. Next, wash your entire body from your chin down with the antiseptic soap. See the next page for directions about how to do this.  3. Rinse well and dry off using a newly washed towel.  The morning of surgery  Repeat steps 1, 2 and 3.   Other suggestions:    Wear freshly washed pajamas or clothing after your evening shower.    Wear freshly washed clothes the day of surgery.    Wash and change your bed sheets the day before surgery to have clean bed sheets after you shower and when you get home from surgery.    If you have trouble washing all areas, make sure someone helps you.    Don t use any deodorant, lotion or powder after your shower.    Women who are menstruating should wear a fresh sanitary pad to the hospital.    Hibiclens - 4% CHG  1. Put about 1 ounce of soap onto a clean, damp washcloth. Wash your skin from your chin down to your toes. Pay special attention to your incision areas.  2. Gently rub your incision area and surrounding areas.  3. Repeat steps 1 and 2 until you have used 4 ounces. Make sure you gently rub your incision area and surrounding areas for a full 5 minutes.   4. Rinse with water and towel dry with a clean towel.       * If you have any post-operative questions regarding your procedure, call our triage team at the [facility] at [phone] (option 2 > option 3).      BODY MASS INDEX (BMI)   Many things can cause foot and ankle problems. Foot structure, activity level, foot mechanics and injuries are common causes of pain. One very important issue that often goes unmentioned is body weight.  Extra weight can cause increased stress on muscles, ligaments, bones and tendons. Sometimes just a few extra pounds is all it takes to put one over her/his threshold. Without reducing that stress, it can be difficult to alleviate pain.  Some people are uncomfortable addressing this issue, but we feel it is important for you to think about it. As Foot & Ankle specialists, our job is addressing the lower extremity problem and possible causes. Regarding extra body weight, we encourage patients to discuss diet and weight management plans with their primary care doctors. It is this team approach that gives you the best opportunity for pain relief and getting you back on your feet.

## 2018-12-18 NOTE — PROGRESS NOTES
"Foot & Ankle Surgery   December 18, 2018    S:  Pt is seen today for surgical consult for chronic left ankle pain.  Conservative therapies that have been attempted without sufficient relief include bracing, PT, RICE/NSAID, intra-articular injection.  Because of insufficient relief, the patient wishes to forego further non-operative cares in favor of surgical intervention.  No guarantees have been given to the outcome.  The injection for the ankle joint helped with 75% of her pain, and last visit, she underwent a sinus tarsi injection which worked \"good\" for pain control as well.      Vitals:    12/18/18 0650   Weight: 70 kg (154 lb 4.8 oz)   Height: 1.588 m (5' 2.5\")     Body mass index is 27.77 kg/m .    ROS - Pos for CC.  Patient denies current nausea, vomiting, chills, fevers, belly pain, calf pain, chest pain or SOB.  Complete remainder of ROS is otherwise neg.      PE:  VASC -   Pulses are palpable, CFT minimally delayed  NEURO -   Light touch intact to all sensory nerve distributions without reported paresthesias.  DERM:    Neg for nodules, lesions or ulcerations  MSK:    Left ankle - very tender at medial soft spot and anterior gutter.  Also very tender at sinus tarsi without STJ pain  LYMPH:     Neg for pitting/non-pitting edema, increased warmth or redness  CALF:   Neg for redness, swelling or tenderness.    Imaging:  MRI left anlke 4/26/17 - IMPRESSION:  Mild strain of the distal soleus muscle. No actual muscle  or tendon tear is seen. No ligament pathology is demonstrated.    Assessment:  23 year old female left ankle pain and sinus tarsi pain    Plan:  The surgical procedure(s) was discussed in detail today.  Risks that were discussed include, but are not limited to, infection, wound healing complications, temporary or permanent nerve damage/numbness, painful scarring, possible recurrence of/new deformity, over-correction, under-correction, possible abnormal bone healing, fixation/hardware failure, " continued or new pain, the need to revise the procedure, as well as blood clots, limb loss, pain syndrome and death.  After a discussion of the procedure, risks, and post-operative care and course, the patient has elected to forego further non-operative management in favor of surgical intervention.  No guarantees were given.  The patient was informed that swelling and generalized discomfort can persist for months, and full recovery can often take up to a year.  I anticipate discontinuation of prescription pain medication at/shortly after suture removal.    Diagnosis:  above  Procedure:  Left ankle scope with stress and possible ligament repair; sinus tarsi evacuation  Activity Level:  Depends on if ligaments are repaired  Pain Management:  Percocet, vistaril, ibuprofen  DVT prophylaxis:   patient instructed on ankle ROM/calf massage exercises; patient advised on early frequent ambulation.  States she has had a blood clot but US 3/15, 11/16 and 12/18 were neg for DVTs.  I could not find a positive duplex scan    Allergies:     Allergies   Allergen Reactions     Amoxil [Penicillins] Rash     Dad unsure of reaction.     Bee Venom Anaphylaxis     Contrast Dye Rash     Contrast Media Ready-Box [facility], 04/09/2014.; Contrast Media Ready-Box [facility], 04/09/2014.  NOTE: this is a contrast media oral with iodine. Premedicate with methylpred standard for IV contrast, request barium contrast for oral contrast.     Kiwi Swelling     Orange Fruit [Citrus] Anaphylaxis     Pineapple Anaphylaxis, Difficulty breathing and Rash     Reglan [Metoclopramide] Other (See Comments)     IV dose only, in ER, rapid heart rate.     Ace Inhibitors      Difficulty in breathing and GI upset     Amitiza [Lubiprostone] Nausea and Vomiting     Amoxicillin-Pot Clavulanate      Latex      Midazolam Unknown     Other reaction(s): Unknown  parent states that when pt takes this medication, she wakes up being very violent .  parent states that when pt takes  this medication, she wakes up being very violent .     No Clinical Screening - See Comments      Bleech/ chest tightness, itchy throat, swollen tongue, hives     Versed      Coming out of pelvic exam at age of 5, was kicking and screaming when coming out of the versed.     Adhesive Tape Rash     Azithromycin Hives and Rash     Cephalexin Itching and Rash     Itchy mouth  Itchy mouth     Keflex [Cephalexin-Fd&C Yellow #6] Hives     Dispo:  Same day  WB assistive device:  Crutches order  Smoking:  neg  Vit D status:  n/a  Clot/bleeding disorder history:   States she has had a clot but I could not find a positive duplex US scan  Job duties/anticipated time off:  Not working    Billing today is based on a time of >25 minutes, more than 50% of which was spent on counseling and coordinating care.    Body mass index is 27.77 kg/m .  Weight management plan: Patient was referred to their PCP to discuss a diet and exercise plan.      Andres Johnson DPM FACFAS FACFAOM  Podiatric Foot & Ankle Surgeon  [facility]  [phone]

## 2018-12-18 NOTE — Clinical Note
Santos Em saw [place] today for surgical consultation regarding ankle scope, possible ligament repair, and sinus tarsi evacuation.  We reviewed the procedures, post-op course and risks, and she was given a copy of her post-op instructionsOpal

## 2018-12-26 ENCOUNTER — OFFICE VISIT (OUTPATIENT)
Dept: PSYCHOLOGY | Facility: CLINIC | Age: 23
End: 2018-12-26
Payer: COMMERCIAL

## 2018-12-26 DIAGNOSIS — F41.1 GAD (GENERALIZED ANXIETY DISORDER): ICD-10-CM

## 2018-12-26 DIAGNOSIS — F33.1 MAJOR DEPRESSIVE DISORDER, RECURRENT EPISODE, MODERATE (H): Primary | ICD-10-CM

## 2018-12-26 PROCEDURE — 90834 PSYTX W PT 45 MINUTES: CPT | Performed by: COUNSELOR

## 2018-12-26 ASSESSMENT — ANXIETY QUESTIONNAIRES
7. FEELING AFRAID AS IF SOMETHING AWFUL MIGHT HAPPEN: NOT AT ALL
GAD7 TOTAL SCORE: 10
6. BECOMING EASILY ANNOYED OR IRRITABLE: SEVERAL DAYS
3. WORRYING TOO MUCH ABOUT DIFFERENT THINGS: SEVERAL DAYS
IF YOU CHECKED OFF ANY PROBLEMS ON THIS QUESTIONNAIRE, HOW DIFFICULT HAVE THESE PROBLEMS MADE IT FOR YOU TO DO YOUR WORK, TAKE CARE OF THINGS AT HOME, OR GET ALONG WITH OTHER PEOPLE: SOMEWHAT DIFFICULT
5. BEING SO RESTLESS THAT IT IS HARD TO SIT STILL: NEARLY EVERY DAY
1. FEELING NERVOUS, ANXIOUS, OR ON EDGE: MORE THAN HALF THE DAYS
2. NOT BEING ABLE TO STOP OR CONTROL WORRYING: SEVERAL DAYS

## 2018-12-26 ASSESSMENT — PATIENT HEALTH QUESTIONNAIRE - PHQ9
5. POOR APPETITE OR OVEREATING: MORE THAN HALF THE DAYS
SUM OF ALL RESPONSES TO PHQ QUESTIONS 1-9: 11
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
## 2018-12-27 ASSESSMENT — ANXIETY QUESTIONNAIRES: GAD7 TOTAL SCORE: 10

## 2019-01-02 ENCOUNTER — ANESTHESIA (OUTPATIENT)
Dept: SURGERY | Facility: CLINIC | Age: 24
End: 2019-01-02
Payer: COMMERCIAL

## 2019-01-02 ENCOUNTER — APPOINTMENT (OUTPATIENT)
Dept: GENERAL RADIOLOGY | Facility: CLINIC | Age: 24
End: 2019-01-02
Attending: PODIATRIST
Payer: COMMERCIAL

## 2019-01-02 ENCOUNTER — ANESTHESIA EVENT (OUTPATIENT)
Dept: SURGERY | Facility: CLINIC | Age: 24
End: 2019-01-02
Payer: COMMERCIAL

## 2019-01-02 ENCOUNTER — HOSPITAL ENCOUNTER (OUTPATIENT)
Facility: CLINIC | Age: 24
Discharge: HOME | End: 2019-01-02
Attending: PODIATRIST | Admitting: PODIATRIST
Payer: COMMERCIAL

## 2019-01-02 VITALS
HEART RATE: 84 BPM | RESPIRATION RATE: 16 BRPM | DIASTOLIC BLOOD PRESSURE: 76 MMHG | OXYGEN SATURATION: 99 % | TEMPERATURE: 98 F | BODY MASS INDEX: 26.93 KG/M2 | SYSTOLIC BLOOD PRESSURE: 130 MMHG | WEIGHT: 152 LBS | HEIGHT: 63 IN

## 2019-01-02 DIAGNOSIS — Z98.890 S/P FOOT SURGERY, LEFT: Primary | ICD-10-CM

## 2019-01-02 LAB — HCG UR QL: NEGATIVE

## 2019-01-02 PROCEDURE — 25000128 H RX IP 250 OP 636: Performed by: ANESTHESIOLOGY

## 2019-01-02 PROCEDURE — 40000171 ZZH STATISTIC PRE-PROCEDURE ASSESSMENT III: Performed by: PODIATRIST

## 2019-01-02 PROCEDURE — 27210794 ZZH OR GENERAL SUPPLY STERILE: Performed by: PODIATRIST

## 2019-01-02 PROCEDURE — 25000125 ZZHC RX 250: Performed by: PODIATRIST

## 2019-01-02 PROCEDURE — 25000132 ZZH RX MED GY IP 250 OP 250 PS 637: Performed by: PODIATRIST

## 2019-01-02 PROCEDURE — 88304 TISSUE EXAM BY PATHOLOGIST: CPT | Mod: 26 | Performed by: PODIATRIST

## 2019-01-02 PROCEDURE — 25800025 ZZH RX 258: Performed by: PODIATRIST

## 2019-01-02 PROCEDURE — 40000986 XR ANKLE PORT LT 2 VW: Mod: LT

## 2019-01-02 PROCEDURE — 25000125 ZZHC RX 250: Performed by: NURSE ANESTHETIST, CERTIFIED REGISTERED

## 2019-01-02 PROCEDURE — 28100 REMOVAL OF ANKLE/HEEL LESION: CPT | Mod: 59 | Performed by: PODIATRIST

## 2019-01-02 PROCEDURE — 36000058 ZZH SURGERY LEVEL 3 EA 15 ADDTL MIN: Performed by: PODIATRIST

## 2019-01-02 PROCEDURE — 37000008 ZZH ANESTHESIA TECHNICAL FEE, 1ST 30 MIN: Performed by: PODIATRIST

## 2019-01-02 PROCEDURE — 71000012 ZZH RECOVERY PHASE 1 LEVEL 1 FIRST HR: Performed by: PODIATRIST

## 2019-01-02 PROCEDURE — 27698 REPAIR OF ANKLE LIGAMENT: CPT | Mod: LT | Performed by: PODIATRIST

## 2019-01-02 PROCEDURE — 29895 ANKLE ARTHROSCOPY/SURGERY: CPT | Mod: 59 | Performed by: PODIATRIST

## 2019-01-02 PROCEDURE — 71000027 ZZH RECOVERY PHASE 2 EACH 15 MINS: Performed by: PODIATRIST

## 2019-01-02 PROCEDURE — 36000056 ZZH SURGERY LEVEL 3 1ST 30 MIN: Performed by: PODIATRIST

## 2019-01-02 PROCEDURE — 37000009 ZZH ANESTHESIA TECHNICAL FEE, EACH ADDTL 15 MIN: Performed by: PODIATRIST

## 2019-01-02 PROCEDURE — 81025 URINE PREGNANCY TEST: CPT | Performed by: ANESTHESIOLOGY

## 2019-01-02 PROCEDURE — 25000128 H RX IP 250 OP 636: Performed by: NURSE ANESTHETIST, CERTIFIED REGISTERED

## 2019-01-02 PROCEDURE — 88304 TISSUE EXAM BY PATHOLOGIST: CPT | Performed by: PODIATRIST

## 2019-01-02 PROCEDURE — 25000125 ZZHC RX 250: Performed by: ANESTHESIOLOGY

## 2019-01-02 PROCEDURE — 71000013 ZZH RECOVERY PHASE 1 LEVEL 1 EA ADDTL HR: Performed by: PODIATRIST

## 2019-01-02 RX ORDER — OXYCODONE HYDROCHLORIDE 5 MG/1
5 TABLET ORAL EVERY 4 HOURS PRN
Status: DISCONTINUED | OUTPATIENT
Start: 2019-01-02 | End: 2019-01-02 | Stop reason: HOSPADM

## 2019-01-02 RX ORDER — METOPROLOL TARTRATE 1 MG/ML
1-2 INJECTION, SOLUTION INTRAVENOUS EVERY 5 MIN PRN
Status: DISCONTINUED | OUTPATIENT
Start: 2019-01-02 | End: 2019-01-02 | Stop reason: HOSPADM

## 2019-01-02 RX ORDER — IBUPROFEN 600 MG/1
TABLET, FILM COATED ORAL
Qty: 20 TABLET | Refills: 0 | Status: ON HOLD | OUTPATIENT
Start: 2019-01-02 | End: 2019-04-01

## 2019-01-02 RX ORDER — FENTANYL CITRATE 50 UG/ML
25-50 INJECTION, SOLUTION INTRAMUSCULAR; INTRAVENOUS
Status: DISCONTINUED | OUTPATIENT
Start: 2019-01-02 | End: 2019-01-02 | Stop reason: HOSPADM

## 2019-01-02 RX ORDER — MEPERIDINE HYDROCHLORIDE 50 MG/ML
12.5 INJECTION INTRAMUSCULAR; INTRAVENOUS; SUBCUTANEOUS
Status: DISCONTINUED | OUTPATIENT
Start: 2019-01-02 | End: 2019-01-02 | Stop reason: HOSPADM

## 2019-01-02 RX ORDER — ONDANSETRON 4 MG/1
4 TABLET, ORALLY DISINTEGRATING ORAL EVERY 30 MIN PRN
Status: DISCONTINUED | OUTPATIENT
Start: 2019-01-02 | End: 2019-01-02 | Stop reason: HOSPADM

## 2019-01-02 RX ORDER — OXYCODONE HYDROCHLORIDE 5 MG/1
5 TABLET ORAL
Status: DISCONTINUED | OUTPATIENT
Start: 2019-01-02 | End: 2019-01-02

## 2019-01-02 RX ORDER — CLINDAMYCIN PHOSPHATE 900 MG/50ML
900 INJECTION, SOLUTION INTRAVENOUS SEE ADMIN INSTRUCTIONS
Status: DISCONTINUED | OUTPATIENT
Start: 2019-01-02 | End: 2019-01-02 | Stop reason: HOSPADM

## 2019-01-02 RX ORDER — SODIUM CHLORIDE, SODIUM LACTATE, POTASSIUM CHLORIDE, CALCIUM CHLORIDE 600; 310; 30; 20 MG/100ML; MG/100ML; MG/100ML; MG/100ML
INJECTION, SOLUTION INTRAVENOUS CONTINUOUS
Status: DISCONTINUED | OUTPATIENT
Start: 2019-01-02 | End: 2019-01-02 | Stop reason: HOSPADM

## 2019-01-02 RX ORDER — KETAMINE HYDROCHLORIDE 10 MG/ML
INJECTION INTRAMUSCULAR; INTRAVENOUS PRN
Status: DISCONTINUED | OUTPATIENT
Start: 2019-01-02 | End: 2019-01-02

## 2019-01-02 RX ORDER — OXYCODONE AND ACETAMINOPHEN 5; 325 MG/1; MG/1
1 TABLET ORAL EVERY 4 HOURS PRN
Status: DISCONTINUED | OUTPATIENT
Start: 2019-01-02 | End: 2019-01-02 | Stop reason: HOSPADM

## 2019-01-02 RX ORDER — DEXAMETHASONE SODIUM PHOSPHATE 4 MG/ML
INJECTION, SOLUTION INTRA-ARTICULAR; INTRALESIONAL; INTRAMUSCULAR; INTRAVENOUS; SOFT TISSUE PRN
Status: DISCONTINUED | OUTPATIENT
Start: 2019-01-02 | End: 2019-01-02

## 2019-01-02 RX ORDER — ALBUTEROL SULFATE 0.83 MG/ML
2.5 SOLUTION RESPIRATORY (INHALATION) EVERY 4 HOURS PRN
Status: DISCONTINUED | OUTPATIENT
Start: 2019-01-02 | End: 2019-01-02 | Stop reason: HOSPADM

## 2019-01-02 RX ORDER — FENTANYL CITRATE 50 UG/ML
25-50 INJECTION, SOLUTION INTRAMUSCULAR; INTRAVENOUS EVERY 5 MIN PRN
Status: DISCONTINUED | OUTPATIENT
Start: 2019-01-02 | End: 2019-01-02 | Stop reason: HOSPADM

## 2019-01-02 RX ORDER — NALOXONE HYDROCHLORIDE 0.4 MG/ML
.1-.4 INJECTION, SOLUTION INTRAMUSCULAR; INTRAVENOUS; SUBCUTANEOUS
Status: DISCONTINUED | OUTPATIENT
Start: 2019-01-02 | End: 2019-01-02 | Stop reason: HOSPADM

## 2019-01-02 RX ORDER — LIDOCAINE 40 MG/G
CREAM TOPICAL
Status: DISCONTINUED | OUTPATIENT
Start: 2019-01-02 | End: 2019-01-02 | Stop reason: HOSPADM

## 2019-01-02 RX ORDER — PROPOFOL 10 MG/ML
INJECTION, EMULSION INTRAVENOUS PRN
Status: DISCONTINUED | OUTPATIENT
Start: 2019-01-02 | End: 2019-01-02

## 2019-01-02 RX ORDER — ONDANSETRON 2 MG/ML
INJECTION INTRAMUSCULAR; INTRAVENOUS PRN
Status: DISCONTINUED | OUTPATIENT
Start: 2019-01-02 | End: 2019-01-02

## 2019-01-02 RX ORDER — HYDROXYZINE PAMOATE 25 MG/1
CAPSULE ORAL
Qty: 25 CAPSULE | Refills: 0 | Status: ON HOLD | OUTPATIENT
Start: 2019-01-02 | End: 2019-03-15

## 2019-01-02 RX ORDER — ONDANSETRON 2 MG/ML
4 INJECTION INTRAMUSCULAR; INTRAVENOUS EVERY 30 MIN PRN
Status: DISCONTINUED | OUTPATIENT
Start: 2019-01-02 | End: 2019-01-02 | Stop reason: HOSPADM

## 2019-01-02 RX ORDER — KETOROLAC TROMETHAMINE 30 MG/ML
30 INJECTION, SOLUTION INTRAMUSCULAR; INTRAVENOUS EVERY 6 HOURS PRN
Status: DISCONTINUED | OUTPATIENT
Start: 2019-01-02 | End: 2019-01-02 | Stop reason: HOSPADM

## 2019-01-02 RX ORDER — OXYCODONE AND ACETAMINOPHEN 5; 325 MG/1; MG/1
TABLET ORAL
Qty: 25 TABLET | Refills: 0 | Status: ON HOLD | OUTPATIENT
Start: 2019-01-02 | End: 2019-03-15

## 2019-01-02 RX ORDER — FENTANYL CITRATE 50 UG/ML
INJECTION, SOLUTION INTRAMUSCULAR; INTRAVENOUS PRN
Status: DISCONTINUED | OUTPATIENT
Start: 2019-01-02 | End: 2019-01-02

## 2019-01-02 RX ORDER — HYDROMORPHONE HYDROCHLORIDE 1 MG/ML
.3-.5 INJECTION, SOLUTION INTRAMUSCULAR; INTRAVENOUS; SUBCUTANEOUS EVERY 10 MIN PRN
Status: DISCONTINUED | OUTPATIENT
Start: 2019-01-02 | End: 2019-01-02 | Stop reason: HOSPADM

## 2019-01-02 RX ORDER — LIDOCAINE HYDROCHLORIDE 10 MG/ML
INJECTION, SOLUTION INFILTRATION; PERINEURAL PRN
Status: DISCONTINUED | OUTPATIENT
Start: 2019-01-02 | End: 2019-01-02

## 2019-01-02 RX ORDER — HYDRALAZINE HYDROCHLORIDE 20 MG/ML
2.5-5 INJECTION INTRAMUSCULAR; INTRAVENOUS EVERY 10 MIN PRN
Status: DISCONTINUED | OUTPATIENT
Start: 2019-01-02 | End: 2019-01-02 | Stop reason: HOSPADM

## 2019-01-02 RX ORDER — CLINDAMYCIN PHOSPHATE 900 MG/50ML
900 INJECTION, SOLUTION INTRAVENOUS
Status: COMPLETED | OUTPATIENT
Start: 2019-01-02 | End: 2019-01-02

## 2019-01-02 RX ADMIN — DEXAMETHASONE SODIUM PHOSPHATE 4 MG: 4 INJECTION, SOLUTION INTRA-ARTICULAR; INTRALESIONAL; INTRAMUSCULAR; INTRAVENOUS; SOFT TISSUE at 10:07

## 2019-01-02 RX ADMIN — PROCHLORPERAZINE EDISYLATE 10 MG: 5 INJECTION INTRAMUSCULAR; INTRAVENOUS at 13:03

## 2019-01-02 RX ADMIN — DEXMEDETOMIDINE HYDROCHLORIDE 8 MCG: 100 INJECTION, SOLUTION INTRAVENOUS at 09:34

## 2019-01-02 RX ADMIN — DEXMEDETOMIDINE HYDROCHLORIDE 0.5 MCG/KG/HR: 100 INJECTION, SOLUTION INTRAVENOUS at 10:20

## 2019-01-02 RX ADMIN — KETAMINE HCL-NACL SOLN PREF SY 50 MG/5ML-0.9% (10MG/ML) 50 MG: 10 SOLUTION PREFILLED SYRINGE at 10:11

## 2019-01-02 RX ADMIN — ROCURONIUM BROMIDE 10 MG: 10 INJECTION INTRAVENOUS at 10:07

## 2019-01-02 RX ADMIN — FENTANYL CITRATE 50 MCG: 50 INJECTION, SOLUTION INTRAMUSCULAR; INTRAVENOUS at 09:31

## 2019-01-02 RX ADMIN — FENTANYL CITRATE 100 MCG: 50 INJECTION, SOLUTION INTRAMUSCULAR; INTRAVENOUS at 09:18

## 2019-01-02 RX ADMIN — PROPOFOL 200 MG: 10 INJECTION, EMULSION INTRAVENOUS at 10:07

## 2019-01-02 RX ADMIN — LIDOCAINE HYDROCHLORIDE 50 MG: 10 INJECTION, SOLUTION INFILTRATION; PERINEURAL at 10:07

## 2019-01-02 RX ADMIN — FENTANYL CITRATE 50 MCG: 50 INJECTION, SOLUTION INTRAMUSCULAR; INTRAVENOUS at 09:24

## 2019-01-02 RX ADMIN — ONDANSETRON 4 MG: 2 INJECTION INTRAMUSCULAR; INTRAVENOUS at 12:20

## 2019-01-02 RX ADMIN — OXYCODONE HYDROCHLORIDE AND ACETAMINOPHEN 1 TABLET: 5; 325 TABLET ORAL at 13:35

## 2019-01-02 RX ADMIN — DEXMEDETOMIDINE HYDROCHLORIDE 12 MCG: 100 INJECTION, SOLUTION INTRAVENOUS at 09:18

## 2019-01-02 RX ADMIN — DEXMEDETOMIDINE HYDROCHLORIDE 8 MCG: 100 INJECTION, SOLUTION INTRAVENOUS at 09:31

## 2019-01-02 RX ADMIN — DEXMEDETOMIDINE HYDROCHLORIDE 8 MCG: 100 INJECTION, SOLUTION INTRAVENOUS at 09:39

## 2019-01-02 RX ADMIN — DEXMEDETOMIDINE HYDROCHLORIDE 12 MCG: 100 INJECTION, SOLUTION INTRAVENOUS at 09:23

## 2019-01-02 RX ADMIN — DEXMEDETOMIDINE HYDROCHLORIDE 12 MCG: 100 INJECTION, SOLUTION INTRAVENOUS at 09:20

## 2019-01-02 RX ADMIN — SODIUM CHLORIDE, POTASSIUM CHLORIDE, SODIUM LACTATE AND CALCIUM CHLORIDE: 600; 310; 30; 20 INJECTION, SOLUTION INTRAVENOUS at 09:32

## 2019-01-02 RX ADMIN — FENTANYL CITRATE 100 MCG: 50 INJECTION, SOLUTION INTRAMUSCULAR; INTRAVENOUS at 10:07

## 2019-01-02 RX ADMIN — SODIUM CHLORIDE, POTASSIUM CHLORIDE, SODIUM LACTATE AND CALCIUM CHLORIDE: 600; 310; 30; 20 INJECTION, SOLUTION INTRAVENOUS at 11:07

## 2019-01-02 RX ADMIN — Medication 100 MG: at 10:07

## 2019-01-02 RX ADMIN — DEXMEDETOMIDINE HYDROCHLORIDE 12 MCG: 100 INJECTION, SOLUTION INTRAVENOUS at 10:07

## 2019-01-02 RX ADMIN — DEXMEDETOMIDINE HYDROCHLORIDE 12 MCG: 100 INJECTION, SOLUTION INTRAVENOUS at 10:10

## 2019-01-02 RX ADMIN — ONDANSETRON 4 MG: 2 INJECTION INTRAMUSCULAR; INTRAVENOUS at 11:21

## 2019-01-02 RX ADMIN — CLINDAMYCIN PHOSPHATE 900 MG: 18 INJECTION, SOLUTION INTRAVENOUS at 10:00

## 2019-01-02 ASSESSMENT — MIFFLIN-ST. JEOR: SCORE: 1400.98

## 2019-01-02 ASSESSMENT — ENCOUNTER SYMPTOMS: SEIZURES: 1

## 2019-01-02 NOTE — ANESTHESIA PROCEDURE NOTES
Peripheral nerve/Neuraxial procedure note : Saphenous  Pre-Procedure  Performed by Khoa Luciano MD  Referred by ANDRESSA  Location: pre-op    Procedure Times:1/2/2019 9:33 AM and 1/2/2019 9:37 AM  Pre-Anesthestic Checklist: patient identified, IV checked, site marked, risks and benefits discussed, informed consent, monitors and equipment checked, pre-op evaluation, at physician/surgeon's request and post-op pain management    Timeout  Correct Patient: Yes   Correct Procedure: Yes   Correct Site: Yes   Correct Laterality: Yes   Correct Position: Yes   Site Marked: Yes   .   Procedure Documentation    Diagnosis:LEFT ANKLE DJD.    Procedure:  left  Saphenous.     Ultrasound used to identify targeted nerve, plexus, or vascular marker and placed a needle adjacent to it., Ultrasound was used to visualize the spread of the anesthetic in close proximity to the above stated nerve.   Patient Prep;mask, sterile gloves, povidone-iodine 7.5% surgical scrub.  .  Needle: insulated, short bevel Needle Gauge: 21.    Needle Length (Inches) 4  Insertion Method: Single Shot.       Assessment/Narrative  Paresthesias: No.  Injection made incrementally with aspirations every 5 mL..  The placement was negative for: blood aspirated, painful injection and site bleeding.  Bolus given via needle..   Secured via.   Complications: none. Comments:  The surgeon has given a verbal order transferring care of this patient to me for the performance of a regional analgesia block for post-op pain control. It is requested of me because I am uniquely trained and qualified to perform this block and the surgeon is neither trained nor qualified to perform this procedure.    10 ml 0.5% bupivacaine

## 2019-01-02 NOTE — ANESTHESIA PREPROCEDURE EVALUATION
Anesthesia Pre-Procedure Evaluation    Patient: Samara Oropeza   MRN: [id number] : 1995          Preoperative Diagnosis: left ankle pain    Procedure(s):  Ankle arthroscopy with possible ligament repair and sinus tarsi evacuation, left lower extremity  (General with saphinous and popliteal block)    Past Medical History:   Diagnosis Date     Anemia      Anxiety      Arthritis      Behcet's disease (H)      Cervical adenitis May 2010     Chronic abdominal pain      Constipation, chronic      Fibromyalgia      Gastro-oesophageal reflux disease      Gastroparesis      Irregular heart beat     tachycardia, has had workup     Migraines      Neuromuscular disorder (H)     fibramyalgia     Palpitations      Seizure (H)      Seizures (H)     unknown etiology     Syncope      Tourette's      Past Surgical History:   Procedure Laterality Date     ARTHROSCOPY KNEE WITH PATELLAR REALIGNMENT  2013    Procedure: ARTHROSCOPY KNEE WITH PATELLAR REALIGNMENT;  Left Knee Arthroscopy, Medial Patellofemoral Ligament Reconstruction with Allograft  ;  Surgeon: Jennifer Acevedo MD;  Location: [facility]     COLONOSCOPY       DENTAL SURGERY      Teeth removal     ENDOSCOPY UPPER, COLONOSCOPY, COMBINED       HC ESOPH/GAS REFLUX TEST W NASAL IMPED >1 HR N/A 2/15/2017    Procedure: ESOPHAGEAL IMPEDENCE FUNCTION TEST WITH 24 HOUR PH GREATER THAN 1 HOUR;  Surgeon: Timothy Matta MD;  Location: [facility]     Anesthesia Evaluation     . Pt has had prior anesthetic.     No history of anesthetic complications          ROS/MED HX    ENT/Pulmonary:     (+)IDA risk factors daytime somnolence, asthma , . .   (-) sleep apnea   Neurologic:     (+)seizures other neuro tourettes    Cardiovascular:     (+) ----. : . . fainting (syncope). :. Irregular Heartbeat/Palpitations, .       METS/Exercise Tolerance:     Hematologic:     (+) Anemia, -      Musculoskeletal:   (+) arthritis, , , -       GI/Hepatic:     (+) GERD      "  Renal/Genitourinary:         Endo:         Psychiatric:     (+) psychiatric history depression      Infectious Disease:         Malignancy:         Other: Comment: behcets disease   (+) H/O Chronic Pain,                        Physical Exam      Airway   Mallampati: III  TM distance: >3 FB  Neck ROM: limited    Dental     Cardiovascular   Rhythm and rate: regular and normal      Pulmonary    breath sounds clear to auscultation            Lab Results   Component Value Date    WBC 4.5 12/12/2018    HGB 12.2 12/12/2018    HCT 37.3 12/12/2018     12/12/2018    CRP <2.9 04/18/2018    SED 13 12/21/2017     12/12/2018    POTASSIUM 3.9 12/12/2018    CHLORIDE 105 12/12/2018    CO2 24 12/12/2018    BUN 8 12/12/2018    CR 0.67 12/12/2018    GLC 76 12/12/2018    SHANKAR 9.4 12/12/2018    MAG 1.7 11/26/2016    ALBUMIN 3.7 12/12/2018    PROTTOTAL 6.9 12/12/2018    ALT 33 12/12/2018    AST 34 12/12/2018    GGT 34 (H) 10/19/2013    ALKPHOS 55 12/12/2018    BILITOTAL 0.3 12/12/2018    LIPASE 101 04/18/2018    AMYLASE 63 01/31/2007    INR 0.99 11/06/2016    TSH 1.06 10/30/2018    T4 1.26 01/31/2007    HCG Negative 04/18/2018    HCGS Negative 06/02/2017       Preop Vitals  BP Readings from Last 3 Encounters:   12/18/18 122/70   12/12/18 126/80   12/11/18 110/70    Pulse Readings from Last 3 Encounters:   12/12/18 114   12/05/18 94   12/03/18 96      Resp Readings from Last 3 Encounters:   12/05/18 14   11/13/18 16   10/31/18 14    SpO2 Readings from Last 3 Encounters:   12/12/18 99%   12/05/18 100%   12/03/18 100%      Temp Readings from Last 1 Encounters:   12/12/18 97.7  F (36.5  C) (Oral)    Ht Readings from Last 1 Encounters:   12/18/18 1.588 m (5' 2.5\")      Wt Readings from Last 1 Encounters:   12/18/18 70 kg (154 lb 4.8 oz)    Estimated body mass index is 27.77 kg/m  as calculated from the following:    Height as of 12/18/18: 1.588 m (5' 2.5\").    Weight as of 12/18/18: 70 kg (154 lb 4.8 oz).       Anesthesia " Plan      History & Physical Review  History and physical reviewed and following examination; no interval change.    ASA Status:  3 .    NPO Status:  > 8 hours    Plan for General, ETT and Periph. Nerve Block for postop pain with Intravenous and Propofol induction. Maintenance will be Balanced.    PONV prophylaxis:  Ondansetron (or other 5HT-3) and Dexamethasone or Solumedrol       Postoperative Care  Postoperative pain management:  IV analgesics, Oral pain medications, Peripheral nerve block (Single Shot) and Multi-modal analgesia.      Consents  Anesthetic plan, risks, benefits and alternatives discussed with:  Patient..                 Khoa Luciano MD                    .

## 2019-01-02 NOTE — ANESTHESIA PROCEDURE NOTES
Peripheral nerve/Neuraxial procedure note : Sciatic and Popliteal  Pre-Procedure  Performed by Khoa Luciano MD  Referred by ANDRESSA  Location: pre-op    Procedure Times:1/2/2019 9:18 AM and 1/2/2019 9:32 AM  Pre-Anesthestic Checklist: patient identified, IV checked, site marked, risks and benefits discussed, informed consent, monitors and equipment checked, pre-op evaluation, at physician/surgeon's request and post-op pain management    Timeout  Correct Patient: Yes   Correct Procedure: Yes   Correct Site: Yes   Correct Laterality: Yes   Correct Position: Yes   Site Marked: Yes   .   Procedure Documentation    Diagnosis:LEFT ANKLE DJD.    Procedure:  left  Sciatic and Popliteal.     Ultrasound used to identify targeted nerve, plexus, or vascular marker and placed a needle adjacent to it., Ultrasound was used to visualize the spread of the anesthetic in close proximity to the above stated nerve.   Patient Prep;mask, sterile gloves, povidone-iodine 7.5% surgical scrub.  .  Needle: insulated, short bevel Needle Gauge: 21.    Needle Length (Inches) 4  Insertion Method: Single Shot.       Assessment/Narrative  Paresthesias: No.  .  The placement was negative for: blood aspirated, painful injection and site bleeding.  Bolus given via needle..   Secured via.   Complications: none. Comments:  The surgeon has given a verbal order transferring care of this patient to me for the performance of a regional analgesia block for post-op pain control. It is requested of me because I am uniquely trained and qualified to perform this block and the surgeon is neither trained nor qualified to perform this procedure.    30 ml 0.5% bupivacaine

## 2019-01-02 NOTE — DISCHARGE INSTRUCTIONS
Dr. Andres Johnson, [facility]     Clinic phone number:  [phone]      GENERAL ANESTHESIA OR SEDATION ADULT DISCHARGE INSTRUCTIONS   SPECIAL PRECAUTIONS FOR 24 HOURS AFTER SURGERY    IT IS NOT UNUSUAL TO FEEL LIGHT-HEADED OR FAINT, UP TO 24 HOURS AFTER SURGERY OR WHILE TAKING PAIN MEDICATION.  IF YOU HAVE THESE SYMPTOMS; SIT FOR A FEW MINUTES BEFORE STANDING AND HAVE SOMEONE ASSIST YOU WHEN YOU GET UP TO WALK OR USE THE BATHROOM.    YOU SHOULD REST AND RELAX FOR THE NEXT 24 HOURS AND YOU MUST MAKE ARRANGEMENTS TO HAVE SOMEONE STAY WITH YOU FOR AT LEAST 24 HOURS AFTER YOUR DISCHARGE.  AVOID HAZARDOUS AND STRENUOUS ACTIVITIES.  DO NOT MAKE IMPORTANT DECISIONS FOR 24 HOURS.    DO NOT DRIVE ANY VEHICLE OR OPERATE MECHANICAL EQUIPMENT FOR 24 HOURS FOLLOWING THE END OF YOUR SURGERY.  EVEN THOUGH YOU MAY FEEL NORMAL, YOUR REACTIONS MAY BE AFFECTED BY THE MEDICATION YOU HAVE RECEIVED.    DO NOT DRINK ALCOHOLIC BEVERAGES FOR 24 HOURS FOLLOWING YOUR SURGERY.    DRINK CLEAR LIQUIDS (APPLE JUICE, GINGER ALE, 7-UP, BROTH, ETC.).  PROGRESS TO YOUR REGULAR DIET AS YOU FEEL ABLE.    YOU MAY HAVE A DRY MOUTH, A SORE THROAT, MUSCLES ACHES OR TROUBLE SLEEPING.  THESE SHOULD GO AWAY AFTER 24 HOURS.    CALL YOUR DOCTOR FOR ANY OF THE FOLLOWING:  SIGNS OF INFECTION (FEVER, GROWING TENDERNESS AT THE SURGERY SITE, A LARGE AMOUNT OF DRAINAGE OR BLEEDING, SEVERE PAIN, FOUL-SMELLING DRAINAGE, REDNESS OR SWELLING.    IT HAS BEEN OVER 8 TO 10 HOURS SINCE SURGERY AND YOU ARE STILL NOT ABLE TO URINATE (PASS WATER)        HOME CARE FOLLOWING MINOR SURGERY        DRESSING  Keep dressing dry and in place until your doctor instructs you to remove the dressing.    DRAINAGE  There should be minimal drainage. If bleeding should occur and soaks through the dressing apply a sterile, dry dressing over it and tape in place. If bleeding persists, apply gentle, steady pressure with your hand over the dressing for 5 minutes. If the bleeding does not stop,  call your doctor.    SKIN CLOSURE  You may have stitches or special skin closures. You will be given an appointment for the removal of any external stitches. You may have stitches under the skin which will absorb. You may have steri-strips over the incision. These look like thin tapes and will peel off in 5-7 days. If they don t after 7 days, you may carefully remove them. You may shower with the steri-strips but do not soak them, as with swimming or taking a bath. A protective covering of plastic may be placed over external stitches for showering.    NOTIFY YOUR PHYSICIAN IF YOU HAVE ANY OF THE FOLLOWING SYMPTOMS:    1. Fever  2. Excessive bleeding or drainage  3. Disruption of the skin closure  4. Swelling, redness or excessive tenderness at the site  5. Severe pain  6. Drainage that is green, yellow, thick white or has a bad odor        You received 1 tablet of percocet pain medication at 1:30p.m. today

## 2019-01-02 NOTE — ANESTHESIA CARE TRANSFER NOTE
Patient: Samara Oropeza    Procedure(s):  Ankle arthroscopy and sinus tarsi evacuation, ligament repair, left lower extremity  (General with saphinous and popliteal block)    Diagnosis: left ankle pain  Diagnosis Additional Information: No value filed.    Anesthesia Type:   General, ETT, Periph. Nerve Block for postop pain     Note:  Airway :Face Mask  Patient transferred to:PACU  Comments: Paulino Report: Identifed the Patient, Identified the Reponsible Provider, Reviewed the pertinent medical history, Discussed the surgical course, Reviewed Intra-OP anesthesia mangement and issues during anesthesia, Set expectations for post-procedure period and Allowed opportunity for questions and acknowledgement of understanding      Vitals: (Last set prior to Anesthesia Care Transfer)    CRNA VITALS  1/2/2019 1059 - 1/2/2019 1135      1/2/2019             Pulse:  107    SpO2:  100 %    Resp Rate (observed):  16                Electronically Signed By: EVI Schaeffer CRNA  January 2, 2019  11:35 AM

## 2019-01-02 NOTE — OR NURSING
Pt insisted on going to bathroom to void after block completed. Pt offered bedpan and bedside commode which pt refused.  Offered catheterization, pt refused.  Explained safety concerns to pt and her boyfriend.  Pt continued to insist she had to go to BR. Taken to BR with two staff members in attendance via wheelchair.  RN remained in BR with pt while she attempted for void for 10 minutes. PT taken back to room via wheelchair.  Explained to pt that she may not have much urine in bladder as she has been NPO and voided when she arrived here.

## 2019-01-02 NOTE — ANESTHESIA POSTPROCEDURE EVALUATION
Patient: Samara Oropeza    Procedure(s):  Ankle arthroscopy and sinus tarsi evacuation, ligament repair, left lower extremity  (General with saphinous and popliteal block)    Diagnosis:left ankle pain  Diagnosis Additional Information: No value filed.    Anesthesia Type:  General, ETT, Periph. Nerve Block for postop pain    Note:  Anesthesia Post Evaluation    Patient location during evaluation: PACU  Patient participation: Able to fully participate in evaluation  Level of consciousness: awake  Pain management: adequate  Airway patency: patent  Cardiovascular status: acceptable  Respiratory status: acceptable  Hydration status: acceptable  PONV: controlled     Anesthetic complications: None          Last vitals:  Vitals:    01/02/19 1230 01/02/19 1245 01/02/19 1300   BP: 130/89 123/83 127/90   Pulse: 79 88 84   Resp: 15 15 17   Temp:      SpO2: 97% 97% 99%         Electronically Signed By: Khoa Luciano MD  January 2, 2019  1:18 PM

## 2019-01-03 ENCOUNTER — MYC MEDICAL ADVICE (OUTPATIENT)
Dept: FAMILY MEDICINE | Facility: CLINIC | Age: 24
End: 2019-01-03

## 2019-01-03 ENCOUNTER — MYC REFILL (OUTPATIENT)
Dept: FAMILY MEDICINE | Facility: CLINIC | Age: 24
End: 2019-01-03

## 2019-01-03 DIAGNOSIS — G47.01 INSOMNIA DUE TO MEDICAL CONDITION: ICD-10-CM

## 2019-01-03 DIAGNOSIS — R10.9 ABDOMINAL CRAMPING: ICD-10-CM

## 2019-01-03 DIAGNOSIS — F51.5 NIGHTMARES: ICD-10-CM

## 2019-01-03 DIAGNOSIS — R10.84 ABDOMINAL PAIN, GENERALIZED: ICD-10-CM

## 2019-01-03 DIAGNOSIS — Z30.9 ENCOUNTER FOR CONTRACEPTIVE MANAGEMENT, UNSPECIFIED TYPE: Primary | ICD-10-CM

## 2019-01-03 LAB — COPATH REPORT: NORMAL

## 2019-01-03 RX ORDER — NORGESTIMATE AND ETHINYL ESTRADIOL 0.25-0.035
1 KIT ORAL DAILY
Qty: 84 TABLET | Refills: 4 | Status: SHIPPED | OUTPATIENT
Start: 2019-01-03 | End: 2019-04-08

## 2019-01-03 RX ORDER — HYOSCYAMINE SULFATE 0.125 MG
0.125-0.25 TABLET ORAL EVERY 4 HOURS PRN
Qty: 40 TABLET | Refills: 1 | Status: SHIPPED | OUTPATIENT
Start: 2019-01-03 | End: 2019-04-08

## 2019-01-03 NOTE — TELEPHONE ENCOUNTER
"Requested Prescriptions   Pending Prescriptions Disp Refills     hyoscyamine (ANASPAZ/LEVSIN) 0.125 MG tablet 40 tablet 1     Sig: Take 1-2 tablets (125-250 mcg) by mouth every 4 hours as needed for cramping    Oral Anticholinergic Agents Passed - 1/3/2019  8:22 AM       Passed - Patient is of age 12 or older       Passed - Recent (12 mo) or future (30 days) visit with authorizing provider's specialty    Patient had office visit in the last 12 months or has a visit in the next 30 days with authorizing provider or within the authorizing provider's specialty.  See \"Patient Info\" tab in inbasket, or \"Choose Columns\" in Meds & Orders section of the refill encounter.             Passed - Patient is not pregnant       Passed - No positive pregnancy test on file within past 12 months        cyproheptadine (PERIACTIN) 4 MG tablet 30 tablet 2     Sig: Take 1 tablet (4 mg) by mouth At Bedtime For nightmares    There is no refill protocol information for this order          "

## 2019-01-03 NOTE — TELEPHONE ENCOUNTER
Duplicate, see MyChart refill encounter. No further action required at this time, encounter closed    Ksenia Medina, RN  Triage Nurse

## 2019-01-03 NOTE — TELEPHONE ENCOUNTER
Prescription approved per [facility] Refill Protocol (hyoscyamine).  LOV: 12/12/2018    Last cyproheptadine (PERIACTIN) 4 MG tablet written on 12/05/2018 #30/2 refills, [facility] message sent to patient notifying of available refills    Ksenia Medina RN  Triage Nurse

## 2019-01-03 NOTE — TELEPHONE ENCOUNTER
Sonja/Provider Pool,     Please see patient's [employer] message.       Please review/sign or advise for refill request of: norgestimate-ethinyl estradiol (SPRINTEC 28) 0.25-35 mg tablet      Routing refill request to provider for review/approval because:  Medication discontinued 01/02/2019 by Andres Johnson with note to note resume at discharge    LOV: 12/12/2018 (pre-op)    Please advise    Thank You!  Ksenia Medina, JESSY  Triage Nurse

## 2019-01-04 RX ORDER — CYPROHEPTADINE HYDROCHLORIDE 4 MG/1
4 TABLET ORAL AT BEDTIME
Qty: 30 TABLET | Refills: 2 | Status: CANCELLED | OUTPATIENT
Start: 2019-01-04

## 2019-01-04 NOTE — TELEPHONE ENCOUNTER
Sonja,    See message below:     Patient is wondering if you would be willing to prescribe amitriptyline (ELAVIL) 50 MG tablet and dicyclomine (BENTYL) 20 MG tablet for patient. Medications usually prescribed by GI.  Medication and pharmacy cued    Called patient, informed patient that medications hyoscyamine and norgestimate-ethinyl were sent to preferred pharmacy    Writer asked patient if she still had the hard copy of her cyproheptadine (PERIACTIN) 4 MG tablet. Patient states she dropped it off at the [facility] in [place], but the medication was too expensive. When she asked for the hard copy of the prescription back, the pharmacy would not give it to her. Writer advised that patient call [facility] and have them request the prescription from [employer]. Writer instructed patient to call back with any issues, patient verbalized understanding.     Patient also request prescriptions for amitriptyline (ELAVIL) 50 MG tablet and dicyclomine (BENTYL) 20 MG tablet. Writer informed patient that these refill requests were sent to her GI provider. Patient states that GI doctor has left the practice and she is wondering if Sonja Abreu would prescribe medications for her. Writer advised patient that Sonja is not back in clinic until Monday, patient is comfortable waiting until that time.     Please review/sign or advise    Thank You!  Ksenia Medina RN  Triage Nurse

## 2019-01-04 NOTE — OP NOTE
Procedure Date: 01/02/2019      SURGEON:  Andres Johnson DPM      ASSISTANT:  Evaristo Moss, PGY-3.      PREOPERATIVE DIAGNOSES:   1.  Chronic left ankle pain.   2.  Sinus tarsi pain, left foot.      POSTOPERATIVE DIAGNOSES:   1.  Chronic left ankle pain.   2.  Sinus tarsi pain, left foot.      PROCEDURES:   1.  Left ankle arthroscopy with synovectomy.   2.  Stressing of ankle under anesthesia.   3.  Open anterior talofibular ligament repair.   4.  Debridement of sinus tarsi, left lower extremity.      PATHOLOGY:  A soft tissue mass from the sinus tarsi measuring approximately 1.5 x 1.5 x 1.5 cm was removed from the sinus tarsi and sent off for histological evaluation.      ANESTHESIA:  General endotracheal with popliteal and saphenous nerve blocks, left lower extremity.      HEMOSTASIS:  A well-padded pneumatic thigh cuff.      ESTIMATED BLOOD LOSS:  Minimal.      MATERIALS:  0 FiberWire was used for ligament repair.      INJECTABLES:  Preoperative popliteal and saphenous nerve block done by Anesthesia.      COMPLICATIONS:  None apparent.      INDICATIONS FOR PROCEDURE:  The patient is a pleasant 23-year-old female who I have seen multiple times for chronic left ankle pain.  She was initially seen in 03/2017.  She underwent a diagnostic and therapeutic left ankle joint injection and followed up approximately a year later.  She indicated near 75% improvement in her overall pain, but continued to have pain over the sinus tarsi.  She also underwent a diagnostic injection into the sinus tarsi and did well with that.  Because she failed to show any meaningful improvement with nonsurgical therapies, we proceeded with surgery.  Preoperative MRI showed no obvious lateral ankle instability, but we discussed possible ligament repair pending intraoperative stressing.      DESCRIPTION OF PROCEDURE:  After obtaining written consent, the patient received preoperative popliteal and saphenous nerve block by Anesthesia.  She  was transferred to the operating room and placed in the supine position on the operating table.  She was placed under general anesthesia.  The left lower extremity was then prepped and draped in the normal aseptic fashion, exsanguinated and the well-padded pneumatic thigh cuff was inflated.  The patient, procedure and site were correctly identified by OR staff.      Attention was directed to the left ankle, where pertinent anatomical structures were drawn out.  The joint was insufflated with approximately 7 mL of normal saline.  A standard anteromedial portal was created and the anterolateral portion of the joint was transilluminated.  The cutaneous nerve was not visualized and so a standard anterolateral portal was created.  Intraarticular examination showed mild chronic synovitis at the anterior medial portion of the joint.  This was debrided.  There was also some chronic synovitis within the lateral gutter.  There was a fair amount of laxity, as I was able to get the shaver and the camera within the lateral portion of the ankle joint, which showed a fair amount of gapping.  The chronic synovitis was debrided along the posteromedial portion of the joint.  The remaining joint appeared essentially unremarkable after debridement.  The cartilage surfaces appeared unremarkable as well.  The arthroscope and the shaver were withdrawn.  Using intraoperative imaging, the joint was stressed and there was some anterior migration of the talus of the anterior drawer examination and there was mild lateral gapping with talar tilt.  Because of this, we elected to proceed with, at minimum, anterior talofibular ligament repair.        Because we were planning on doing an open sinus tarsi evacuation, we elected to not do this arthroscopically, but rather open.  A standard curvilinear incision along the anterior margin of the fibula was created just slightly more anterior than typical, so we could access the sinus tarsi.  Bleeding  vessels were electrocauterized as necessary.  The cutaneous nerve was identified at the dorsal aspect of the incision, and so this was carefully retracted throughout the duration of the procedure.  Blunt dissection was used to carry the incision down to the anterior talofibular ligament and lateral joint capsule, which was incised.  With exploration of the sinus tarsi through this incision, the patient did have a very large fluid-filled cyst within the sinus tarsi that was sharply resected with a #15 blade.  There was some inflammatory tissue within the sinus tarsi as well, and this was evacuated with a rongeur.  Otherwise, visible cartilage within the posterior facet of the subtalar joint was healthy and unremarkable.  The wound was flushed with copious amounts of normal saline.  With the ankle in neutral to the lower extremity and heel slightly everted, a pants-over-vest repair was done for the anterior talofibular ligament and we incorporated the inferior slip of the extensor retinaculum in the repair as well.  These were hand-tied with care, again, to avoid the adjacent cutaneous nerve.  After repair, the ankle was stressed and showed no significant instability with both talar tilt and anterior drawer.  The wound was flushed with copious amounts of normal saline.  A layered closure was performed with 4-0 Vicryl and 4-0 nylon.      A dry sterile dressing was applied to the patient's left lower extremity.  She appeared to tolerate the procedure and anesthesia well, and was transferred to the PACU with vital signs stable and vascular status intact to the left foot.      PLAN:  The patient will be nonweightbearing and will follow up with me in clinic in approximately 1 week or sooner with any acute issues.         GREG CRISOSTOMO DPM             D: 2019   T: 2019   MT: MARY JANE      Name:     LUCINA BAH   MRN:      [id number]        Account:        [id number]   :      1995            Procedure Date: 01/02/2019      Document: [id number]

## 2019-01-07 RX ORDER — DICYCLOMINE HCL 20 MG
20 TABLET ORAL 4 TIMES DAILY PRN
Qty: 120 TABLET | Refills: 3 | Status: SHIPPED | OUTPATIENT
Start: 2019-01-07 | End: 2019-04-08

## 2019-01-07 RX ORDER — AMITRIPTYLINE HYDROCHLORIDE 50 MG/1
50 TABLET ORAL AT BEDTIME
Qty: 30 TABLET | Refills: 11 | Status: ON HOLD | OUTPATIENT
Start: 2019-01-07 | End: 2019-03-15

## 2019-01-08 ENCOUNTER — OFFICE VISIT (OUTPATIENT)
Dept: PODIATRY | Facility: CLINIC | Age: 24
End: 2019-01-08
Payer: COMMERCIAL

## 2019-01-08 VITALS — HEIGHT: 63 IN | RESPIRATION RATE: 16 BRPM | BODY MASS INDEX: 26.93 KG/M2 | WEIGHT: 152 LBS

## 2019-01-08 DIAGNOSIS — Z98.890 S/P FOOT SURGERY, LEFT: Primary | ICD-10-CM

## 2019-01-08 PROCEDURE — 99024 POSTOP FOLLOW-UP VISIT: CPT | Performed by: PODIATRIST

## 2019-01-08 RX ORDER — OXYCODONE AND ACETAMINOPHEN 5; 325 MG/1; MG/1
TABLET ORAL
Qty: 15 TABLET | Refills: 0 | Status: ON HOLD | OUTPATIENT
Start: 2019-01-08 | End: 2019-03-15

## 2019-01-08 RX ORDER — HYDROXYZINE PAMOATE 25 MG/1
CAPSULE ORAL
Qty: 15 CAPSULE | Refills: 0 | Status: ON HOLD | OUTPATIENT
Start: 2019-01-08 | End: 2019-03-15

## 2019-01-08 ASSESSMENT — MIFFLIN-ST. JEOR: SCORE: 1400.66

## 2019-01-08 NOTE — PROGRESS NOTES
"Foot & Ankle Surgery  January 8, 2019    S:  Patient in today approx 6 days sp left ankle scope, open ATFL repair and sinus tarsi evacuation.  Pain levels improving but still elevated.  She states her lateral 3 toes were \"paralyzed\" but she can move them since surgery.  Following post-op instructions    Resp 16   Ht 1.588 m (5' 2.5\")   Wt 68.9 kg (152 lb)   LMP  (LMP Unknown)   BMI 27.36 kg/m        ROS - positive for CC.  Patient denies current nausea, vomiting, chills, fevers, belly pain, calf pain, chest pain or SOB.  Complete remainder of ROS is otherwise neg.    PE - sutures intact, skin margins well coapted.  Mild bruising but edema levels are low-grade.  No SOI.  Skin shows no trophic, color or temperature changes otherwise.  No calf redness, swelling or pain noted otherwise.    Imaging - IMPRESSION: Postoperative change.    Pathology - soft tissue mass from sinus tarsi - FINAL DIAGNOSIS:   Left ankle mass, excision.   - Benign fibrovascular and adipose tissue.  See microscopic description.    A/P - 23 year old yo patient approx 6 days sp above procedure  -personally reviewed Path report  -reviewed procedure and surgical findings  -redressed foot/ankle  -continue all post-op instructions without change; reviewed  -30m TID ABCs left ankle; will place referral for formal PT starting next visit    Follow up  -  1 week or sooner with acute issues    Plan for [id number] - not working currently.,     Body mass index is 27.36 kg/m .  Weight management plan: Patient was referred to their PCP to discuss a diet and exercise plan.      Andres Johnson DPM FACFAS FACFAOM  Podiatric Foot & Ankle Surgeon  [facility]  [phone]    "

## 2019-01-08 NOTE — Clinical Note
Santos Em saw Samara today, 6 days sp ankle scope, ATFL ligament repair and sinus tarsi evacuation.  Pain levels are elevated but improving.  She's able to wiggle her toes more than prior to surgery, which is an interesting finding.  She'll follow up in 1 week for suture removal and to start PT.Opal

## 2019-01-09 ENCOUNTER — OFFICE VISIT (OUTPATIENT)
Dept: RHEUMATOLOGY | Facility: CLINIC | Age: 24
End: 2019-01-09
Payer: COMMERCIAL

## 2019-01-09 VITALS
TEMPERATURE: 97.9 F | HEIGHT: 63 IN | HEART RATE: 91 BPM | BODY MASS INDEX: 26.93 KG/M2 | WEIGHT: 152 LBS | SYSTOLIC BLOOD PRESSURE: 108 MMHG | OXYGEN SATURATION: 100 % | DIASTOLIC BLOOD PRESSURE: 74 MMHG

## 2019-01-09 DIAGNOSIS — M35.2 BEHCET'S DISEASE (H): ICD-10-CM

## 2019-01-09 PROCEDURE — 99213 OFFICE O/P EST LOW 20 MIN: CPT | Performed by: INTERNAL MEDICINE

## 2019-01-09 RX ORDER — COLCHICINE 0.6 MG/1
TABLET ORAL
Qty: 180 TABLET | Refills: 0 | Status: ON HOLD | OUTPATIENT
Start: 2019-01-09 | End: 2019-04-01

## 2019-01-09 ASSESSMENT — ROUTINE ASSESSMENT OF PATIENT INDEX DATA (RAPID3)
RAPID3 INTERPRETATION: HIGH > 12.0
TOTAL RAPID3 SCORE: 21.5

## 2019-01-09 ASSESSMENT — MIFFLIN-ST. JEOR: SCORE: 1400.66

## 2019-01-09 NOTE — PROGRESS NOTES
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 Lilliana Miramontes- complicated migraine  # On Sprintec for birth control   # Borderline transaminase elevation    12/18 Basic blood cell counts, liver and kidney function labs within normal limits    Meets ISG 1990 criteria for Behcets. Initially, very good response to colchicine which has reduced the intensity and frequency of the oral ulcers in the past. Patient relapsed with genital ulcers and few oral ulcers in the end of 2016 and also with folliculitis in skin. Seen Derm Dr. Elliott. He recommended otezla. Otezla is working for her and she takes twice daily 30mg PO daily. Chest pain , mouth sores, hand pain, morning pain are all better when she tried otezla 30mg twice daily. Currently off of otezla since about 8/18 because of frequent UTI per patient. No episodes of major oral or genital ulcers in the interim. Okay to watch without otezla. Continue Colchicine current dose: 0.6 mg in AM and 0.6mg in PM daily (dose decreased 4/18). Because of GI symptoms, we had tried colchicine taper and see if that improves her GI symptoms. But her GI symptoms were unchanged with colchicine taper. Patient does not think her GI symptoms are related to colchicine. Continue colchicine 0.6mg PO BID.  Neurology investigated for seizures and  cause. Hospitalized 12/17. 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     Has tried prednisone in the past, but does not tolerate well due to mood issues, and has been off prednisone for the past 6+ months. Has H/o Tourettes. Followed by Dr. Miramontes.     She continues to have GI symptoms  Colonoscopy and endoscopy negative 2018.  Has gastroparesis.  Behcets may have GI involvement but no evidence of GI inflammation at this time. Dr. Baker has evaluated her. Dr. Rollins did the endoscopies. Her abdominal pain is not related to otezla/colchicine as her abdominal symptoms were present even before they were started. This was verified with the patient.  Patient seeing [facility] currently. [facility] suggested 6 week therapy in [place] for pelvic floor muscles. Patient is not able to afford that and thinking of alternative options.     She gets visual symptoms  But there is no evidence of uveitis including posterior uveitis or retinal vasculitis, denies symptoms at today's visit. She has seen Dr. Garcia in the past and also seen Dr. Heaton around 2/16 and 9/17. Patient still getting double vision and floaters but 9/17 eye exam was stable.     She also likely has fibromyalgia. Currently managing her behcets.     For chest symptoms, she was referred to pulmonology by GI. CT chest negative for any lung issues 1/18    - Continue oral gel/Triamcinolone acetonide cream (0.1 percent in [place]) three to four times daily during attacks of oral ulcers and be used until pain from the ulcer ceases. Potent topical corticosteroids may be used for genital ulcers. Also use magic mouthwash as needed.    Return in about 6 months (around 7/9/2019).    No orders of the defined types were placed in this encounter.      Medications Discontinued During This Encounter   Medication Reason     Apremilast (OTEZLA) 10 & 20 & 30 MG TBPK      predniSONE (DELTASONE) 20 MG tablet      apremilast (OTEZLA) 30 MG tablet      Current  Outpatient Medications   Medication Sig Dispense Refill     albuterol (PROAIR HFA/PROVENTIL HFA/VENTOLIN HFA) 108 (90 BASE) MCG/ACT Inhaler Inhale 2 puffs into the lungs every 6 hours as needed for shortness of breath / dyspnea or wheezing 1 Inhaler 1     amitriptyline (ELAVIL) 50 MG tablet Take 1 tablet (50 mg) by mouth At Bedtime 30 tablet 11     artificial tears OINT ophthalmic ointment 0.5 inch strip each eye at night 1 Tube 11     aspirin (ASPIRIN CHILDRENS) 81 MG chewable tablet Take 81 mg by mouth as needed        benzocaine (TOPICALE XTRA) 20 % GEL Apply as needed locally to mouth or nasal ulcers for pain; 4 times daily as needed 30 g 1     betamethasone valerate (VALISONE) 0.1 % cream Apply topically 2 times daily       botulinum toxin type A (BOTOX) 100 UNITS injection Inject 200 Units into the muscle every 3 months 200 Units 3     clobetasol (TEMOVATE) 0.05 % cream Apply topically 2 times daily 60 g 0     colchicine (COLCYRS) 0.6 MG tablet Take 0.6 mg in AM and 0.6mg in PM every day 180 tablet 0     cyproheptadine (PERIACTIN) 4 MG tablet Take 1 tablet (4 mg) by mouth At Bedtime For nightmares 30 tablet 2     diclofenac (VOLTAREN) 1 % GEL topical gel Apply 2-4 grams to affected area(s) up to 4 times per day as needed. This is an anti-inflammatory medication. 100 g 4     dicyclomine (BENTYL) 20 MG tablet Take 1 tablet (20 mg) by mouth 4 times daily as needed 120 tablet 3     diltiazem 2% in PLO cream, FV COMPOUNDED, 2% GEL Apply small pea size amount three times daily to anus until pain is gone. 30 g 1     EPINEPHrine (EPIPEN 2-EAMON) 0.3 MG/0.3ML injection Inject 0.3 mLs (0.3 mg) into the muscle as needed for anaphylaxis 0.6 mL 3     guanFACINE (TENEX) 1 MG tablet Take 3 tablets (3 mg) by mouth twice daily in the morning and evening. 180 tablet 3     hydrocortisone 1 % CREA cream Place rectally 2 times daily as needed for itching 28.35 g 1     hydrOXYzine (VISTARIL) 25 MG capsule Take 1-2 tablets every 4-6  hours as needed for pain control. 15 capsule 0     hydrOXYzine (VISTARIL) 25 MG capsule Take 1-2 tablets every 4-6 hours as needed for pain control. 25 capsule 0     hyoscyamine (ANASPAZ/LEVSIN) 0.125 MG tablet Take 1-2 tablets (125-250 mcg) by mouth every 4 hours as needed for cramping 40 tablet 1     hypromellose (GENTEAL) 0.3 % SOLN 1 drop every hour as needed for dry eyes        ibuprofen (ADVIL/MOTRIN) 600 MG tablet Take 600mg of ibuprofen every 6 hours x 5 days to assist in pain/inflammation control.  If you are not tolerating the medication, you are ok to stop. 20 tablet 0     Lactase (LACTAID PO) Take by mouth daily       lactulose 20 GM/30ML SOLN Take 30 mLs by mouth 3 times daily as needed (for constipation) 300 mL 3     lidocaine (XYLOCAINE) 2 % jelly Apply 1 Tube topically daily Reported on 4/21/2017       linaclotide (LINZESS) 145 MCG capsule Take 1 capsule (145 mcg) by mouth every morning (before breakfast) 90 capsule 1     Magic Mouthwash (FV std formula) lidocaine visc 2% 2.5mL/5mL & maalox/mylanta w/ simeth 2.5mL/5mL & diphenhydrAMINE 5mg/5mL Swish and swallow 10 mLs in mouth every 6 hours as needed for mouth sores 1 Bottle 1     Magnesium Aspartate HCl 1230 MG PACK Take 1 packet by mouth daily as needed 30 each 3     nitroFURantoin macrocrystal-monohydrate (MACROBID) 100 MG capsule Take 1 capsule (100 mg) by mouth At Bedtime 90 capsule 1     norgestimate-ethinyl estradiol (ORTHO-CYCLEN/SPRINTEC) 0.25-35 MG-MCG tablet Take 1 tablet by mouth daily 84 tablet 4     omeprazole (PRILOSEC) 40 MG capsule Take 1 capsule (40 mg) by mouth daily 90 capsule 3     OnabotulinumtoxinA (BOTOX IJ) Inject 175 Units into the muscle Lot: [id number]  Exp: 01/2021       OnabotulinumtoxinA (BOTOX IJ) Inject 175 Units into the muscle once Lot # /C3 with Expiration Date:  11/2020       OnabotulinumtoxinA (BOTOX IJ) Inject 165 Units as directed once Lot#: /C3  Exp: 10/2020       OnabotulinumtoxinA (BOTOX IJ) Inject  165 Units as directed once Lot # /C3   Exp:  10/2020       OnabotulinumtoxinA (BOTOX IJ) Inject 165 Units into the muscle once Lot /C3  Exp 06/2020       ondansetron (ZOFRAN) 8 MG tablet Take 1 tablet (8 mg) by mouth every 8 hours as needed for nausea 60 tablet 1     order for DME Equipment being ordered: RollAbout knee scooter 1 Device 0     order for DME Equipment being ordered: adult pair of crutches 1 Device 0     oxyCODONE-acetaminophen (PERCOCET) 5-325 MG tablet Take 1-2 tablets every 4-6 hours as needed for pain.  Do not take other tylenol products with this medication, as too much tylenol can be damaging to the liver. 15 tablet 0     oxyCODONE-acetaminophen (PERCOCET) 5-325 MG tablet Take 1-2 tablets every 4-6 hours as needed for pain.  Do not take other tylenol products with this medication, as too much tylenol can be damaging to the liver. 25 tablet 0     polyethylene glycol (MIRALAX/GLYCOLAX) powder Take 1 capful by mouth 3 times daily       sennosides (SENOKOT) 8.6 MG tablet Take 1 tablet by mouth daily       sucralfate (CARAFATE) 1 GM/10ML suspension Take 10 mLs (1 g) by mouth 4 times daily 1200 mL 2     triamcinolone (KENALOG) 0.1 % cream Apply sparingly to lesions twice daily as needed 80 g 1     Austen Marquez MD.           Active Problem List:     Patient Active Problem List    Diagnosis Date Noted     Aphthous ulcer 11/13/2018     Priority: Medium     Overview:   Created by Conversion       Cough 11/13/2018     Priority: Medium     Overview:   Created by Conversion       Dysthymic disorder 11/13/2018     Priority: Medium     Overview:   Created by Conversion       Displacement of lumbar intervertebral disc without myelopathy 11/13/2018     Priority: Medium     Overview:   Created by Conversion       Iron deficiency associated with nonfamilial restless legs syndrome 11/13/2018     Priority: Medium     Other specified symptom associated with female genital organs 11/13/2018      Priority: Medium     Overview:   Created by Conversion       Enthesopathy of hip region 11/13/2018     Priority: Medium     Overview:   Created by Conversion       Tourette's syndrome 11/13/2018     Priority: Medium     Overview:   Created by Conversion       Somatic symptom disorder 06/01/2018     Priority: Medium     Pelvic floor weakness 04/25/2018     Priority: Medium     Spells of decreased attentiveness 12/19/2017     Priority: Medium     Mobile cecum 11/09/2017     Priority: Medium     Cecum noted in Right lower quadrant on 4/17 CT scan, and in Left upper Quadrant on CT on 11/2017.       Vitamin D deficiency 10/11/2017     Priority: Medium     How low, unknown/not found. On D when tested at 27. Starting cholecalciferol October 2017. Needs recheck.       Convulsions, unspecified convulsion type (H) 10/03/2017     Priority: Medium     Transient alteration of awareness 10/03/2017     Priority: Medium     Chronic pain syndrome 07/27/2017     Priority: Medium     Major depressive disorder, recurrent episode, moderate (H) 06/27/2017     Priority: Medium     Cervical pain 05/02/2017     Priority: Medium     Acute left ankle pain 03/31/2017     Priority: Medium     Cervical dystonia 03/28/2017     Priority: Medium     PTSD (post-traumatic stress disorder) 01/17/2017     Priority: Medium     Patellofemoral instability 10/20/2016     Priority: Medium     Fibromyalgia 08/04/2016     Priority: Medium     Rheumatoid arthritis of multiple sites without rheumatoid factor (H) 08/04/2016     Priority: Medium     Raynaud's disease without gangrene 08/04/2016     Priority: Medium     Chronic abdominal pain 08/04/2016     Priority: Medium     Palpitations 01/12/2016     Priority: Medium     On colchicine therapy 10/30/2015     Priority: Medium     Spell of shaking 05/06/2015     Priority: Medium     Migraine 02/04/2015     Priority: Medium     Problem list name updated by automated process. Provider to review       Behcet's  disease (H) 12/10/2014     Priority: Medium     Headaches due to old head injury 11/12/2013     Priority: Medium     Milk protein intolerance 10/11/2013     Priority: Medium     Intestinal malabsorption 10/11/2013     Priority: Medium     Concussion 02/13/2013     Priority: Medium     Jan 2013, with prolonged recovery- followed by sports med         Knee pain 01/03/2013     Priority: Medium     Generalized anxiety disorder 06/25/2009     Priority: Medium     Overview:   Created by Conversion       Anxiety state 06/25/2009     Priority: Medium     Overview:   Created by Conversion    Replacement Utility updated for latest IMO load       Tics - Tourette syndrome 05/18/2009     Priority: Medium     Followed by psychotherapy. Symptoms well managed. Originally diagnosed at [facility]. (Dr. Simpson)           IBS (irritable bowel syndrome) 05/18/2009     Priority: Medium     Allergic rhinitis 05/18/2009     Priority: Medium     GERD (gastroesophageal reflux disease) 01/10/2008     Priority: Medium     Gastroparesis 1995     Priority: Medium          History of Present Illness:     Chief Complaint   Patient presents with     RECHECK     Seen Dr. Marilyn Moran [facility]ology in [facility] [place]:    Original presentation 2014:    Ms Oropeza is a 19 y.o. female who presents with one year of presentation of painful oral ulcers (monthly) once that have worsened with two episodes in the last two months that presented with painful vulvar or vaginal ulcers (2 episodes so far every month) [not sure if they leave scar] as well that timing coincides with menses ([facility] [place]: 8/25/14).   ORAL ULCERS: last two episodes were severe, painful, white base with erythematous halo, that appear and disappear in the matter of 1-2 weeks without, does not bleed and doesn't respond to any treatment used (magic mouthwash), 10-15 in number with the biggest one being dime size.     VAGINAL ULCERS: 2-3 in number between labia majora and minora that occur  simultaneously with oral ulcers, painful, non bleeding ulcers that are erythematous, no pus.     FOLLICULITIS: patient reports having spots in legs specially around ankle and knee, but arms, and neck are affected as well. Small lesions that resemble ingrown hair, most are red erythematous elevated lesions, well demarcated, some with liquid that patient refers as pimple like.     SKIN RASHES: welts and red macules with well defined borders that are pruritic and non-ulcerative on chest, arms and back. Benadryl relieves.     ARTHRALGIAS: In descending order of severity: hips, knees, wrists, shoulders, neck, lumbar, fingers.   C/o morning stiffness in hips, back and neck lasting for about until noon.     Ms. Oropeza reports they present in severe flares and never fully resolve, but do get better. She has seen some swelling and warmth on the affected joints but has not noticed and redness. Has tried Tylenol, ibuprofen, and hydrocodone with not much benefit.     FEVERS: Reports 3-4 sporadic episodes per month of self limited fevers of 38 C that occur during the evenings and associate facial flushing.     HEADACHES: occur daily and are bitemporal and irradiate occipitally, pulsatile in nature, intensity varies from intense to mild, are worsened with movement. On treatment with ibuprofen and somatriptan without any positive results.     VISION CHANGES: Patient reports that suddenly she would only see a gray blotch in her right eyes and would persist like this for a day and a half. Also reports blurriness in her left eye. Presence of pain and burning sensation of eyeball with associated redness. Has changed prescription glasses in the matter of 2 years 3 times. She has had opthalmology exam and was not suggestive of any evidence of uveitis.   She was also evaluated in ED for a dizzy spell.     ABD PAIN W/ INTERMITTENT DIARRHEA AND CONSTIPATION: Patient reports abdominal pain that is intermittent, periods of 7 days without  bowel movement and feeling bloated with change of pattern to diarrhea (liquidy without blood nor mucus, non foul smelling). Has taken Bentyl, Zegrid, and rinetidine with mild clinical improvement.  She also reports small red nodules after each needle stick for blood draw.   Neurology saw her back then and was not impressed with her presentation as physical examination was normal. Also MRI brain normal: Findings: No definite hemorrhage, mass affect, midline shift, or ventriculomegaly is noted.There are a few scattered non specific white matter T2 hyperintensities. Axial diffusion weighted images are unremarkable.     The major vascular structures appear patent. There is opacification and severe mucosal thickening in the right maxillary sinus. The remaining paranasal sinuses, and mastoid air cells are clear. Orbits are unremarkable  She has + HSV-1 IGG. Seen ID. Negative for Herpes simplex virus culture. HSV IGM I/II COMBINATION SENT TO [facility]  RESULTS = <0.91  REF RANGE: <0.91 = NEGATIVE  ID did not think HSV could explain her mouth and genital sores.     CRP within normal limits. HIV negative. CBC within normal limits; UA negative. Creatinine within normal limits   CYNDIE neg.  Antigliadin neg.   ANti TTG neg.   [facility] 2014: Colonoscopy and endoscopy neg; result not available. Not sure if biopsies were done.   Raynaud's phenomenon:  Onset: about 2013  Digits: all fingers  Digital ulcers: no  Color changes: white ---> purple and red  Duration of an attack: About couple of hours per mom  Pain during attack: not clear pain but bruise like feeling  Frequency of attacks: few times a month  Family history of Raynauds: no  GERD: very long time    No hemoptysis.   No miscarriages/ no thrombosis in past.   No family or personal history of psoriasis, ulcerative colitis or chron's disease.   No buttock pain or low back pain or stiffness in AM    Interim history:  Dec 2014- Multiple mouth ulcers and did not eat for 5 days. This  coincided with periods. Colchicine 0.6mg PO BID started in Nov 2014.   Prednisone taper in Dec 20mg to 0 in 4 weeks. She also used magic mouthwash. Kenalog cream. After going to the ER, 3 days later her ulcers were better. She thinks it improved after the menstruation stopped.   LMP 3 weeks ago.   COLCHICINE HAS HELPED A LOT REDUCING THE INTENSITY OF MENSTRUATION ASSOCIATED ORAL AND VULVAR ULCERS.     Interim history: 6/15    Since last visit only two episodes of mouth sores- small sized 6   No genital ulcers since last visit.   Colchicine 1.2 mg in AM and 0.6mg in PM keeps her symptoms under control.     Admitted in 5/15:  Admission Diagnoses:  - LUE weakness  - spell  - hx of migraines  - hx of Tourette syndrome  - hx of Behcet's disease    Discharge Diagnoses:   - spell likely 2/2 anxiety  - hx of migraines  - hx of Tourette syndrome  - hx of Behcet's disease    CT and MRI brain was normal.     Seeing Dr. Lilliana Miramontes soon as outpatient.     Dr. Garcia did not find any intraocular inflammation.      Interm 10/30/2015  ED for migraine. Continues to see neuro - MRI brain without lesions noted. Saw GI - upper barium normal. May be considering repeat colo. Worsening lesions in mouth and genitals. Has had continuous lesion in mouth x 2 months. 2 genital ulcers. Had fracture of right sesamoid in foot. Continues to have low grade fevers. New folliculitis on chest, legs and arms.     Interim hx: 1/11/2016    Pt states that since last visit she continues to have oral ulcers and has noted more folliculitis-like lesions on her lower extremities.  She states that on her right lower leg she had a red macular spot that has since resolved.  She denies uveitis.  She states that the colchicine initially helped but then stopped working.  She takes 0.6 mg TID.  She stopped taking her prednisone last week as she feels like this hasn't helped.  She hasn't had any episodes of vaginal ulcers.      She saw GI who stated she has  "gastroparesis.  She is seeing Cardiology later this week for possible syncopal episodes.      Interim history 5/16  Mouth ulcers X 1 last month  Genital ulcers - none since last visit.     Bilateral MCPs pain, knee pain and low back pain +ve increased since last visit  Morning stiffness X 2 hours (increasing)   5-6/10    \"overall myalgia\" has gotten worse    C/o pseudofolliculitis lesion on anterior shins bilaterally worse    Interim history: (7/18/2016)  Patient has just started Humira for 2 doses on 7/1 and 7/15 and reported minimal improvement of her hip pain but otherwise, did not notice anything different.     She reported painful mouth ulcer once a month since last visit but noticed that it has been getting deeper.   Denied any genital ulcers.    Still has ongoing bilateral MCPs pain, knee pain but this has been intermittent and she did not have any much pain today. She reported 2-3 hours morning stiffness of both hands and pain score of 6/10 at her knees and 8/10 of her hands but currently takes no pain medication except prn ativan which she takes when her muscle is really tight.     The only concern is that she gained weight even though she has not been eating much (eat once a day) and do exercises every day for 1 hour (with video of yoga, pilates). Her mother wonders if she needs to have some labs work up include sex hormone, thyroid, cortisol.    Interval history 9/14/16  Patient was started Humira at the last visit, patient reports worsening of skin ulcers since starting Humira and stopped taking Humura for the past 2 weeks. Patient reports oral and genital ulcers has been stable even before starting Humira and has not had any interval oral/genital ulcers. However she reports recurrent skin ulcers occurring on a daily basis that affects her chest and anterior legs. Patient also has stopped taking prednisone, she reports that she doesn't feel the prednisone helps, her last dose of prednisone was 6+ months " ago. Patient also report worsening low back pain that sometimes causes her to limp.    In the interval patient also visited ED on 8/31/16 for left hand pain and what the patient describes as phlebitis, no medication or procedure was done and the patient was discharged with a splint. Patient also reported 1 episode of chest pressure that was associated with dyspnea and numbness of the left arm, she was shopping in a supermarket at the time of onset, and the pressure resolved after she took a nap.    Patient denies any infectious symptoms in the interval, no fever, chills, night sweat, cough, dysuria, denies eye pain or visual changes.    November 4, 2016  Oct - had one genital ulcer  Oral ulcers X 5- around menstruation time.   Knee pain- chronic on the left side  Dizziness spells - on and off; been to the ER for that in the past.   On and off migraines  July 2013 - s/p MPFL reconstruction plus LRL 7/2013  Morning stiffness in knee (left) X 1 hour    Severe jaw pain and chest pain- patient wondering if this is Tourette's or esophageal spasms. Cardiac workup negative.   Fever     January 13, 2017  [place] [facility] [place] from [place].   Chest pain on and off still continues. Painful with deep breaths.   Oral ulcers - few minor ones lasting for one week;   One major one in roof of mouth lasting for about one week.   Knee pain +ve - reviewed the recent MRI with her orthopedic surgeon.   On and off migraines  otezla is approved. Still waiting to receive the meds.     March 31, 2017  Otezla current dose 15mg PO BID.   She is tolerating okay at this dose and is willing to increase the dose further up to 30mg BID.   Her joint pains are better on otezla. Knee pain is also better.   Back and neck pain +ve 8/10 when it is worse. Intramuscular botox injections were given on Monday in PMR.   2 minor genital ulcers in the interim- resolved.   Few oral ulcers in the interim- resolved.   Nasal ulcer -  resolved.   Migraines on and off.   Nausea with otezla but improving.   Dizziness and blackouts on and off. She fell once and hurt her ankle. Wearing boot in left leg.   Complete ROS negative except for above    May 17, 2017  Tolerating otezla better. Not throwing up anymore. Current dose 20mg in AM and 30mg in PM (2nd week).   Patient thinks that her oral ulcers frequency and severity are improving with otezla. Also no genital ulcers in the interim.   Currently on doxycycline for bronchitis/?pneunomia. 4 more days left.     Joint pain history  2 weeks ago/ dx with pneumonia 1 week ago/  Involved joints: all over  Pain scale: 6.5/10   Wakes the patient from sleep : Yes  Morning stiffness: Yes for 120 minutes  Meds used:otezla,colchicine     Interim history  Since last visit:  1. Infections - Yes/ pneumonia  2. New symptoms/medical problem - Yes/ thinks she may have had a mini-seizure about 10 days ago ; did not seek medical attention; did not reoccur after that. Patient seeing PCP today.  3. Any side effects from Rheum medications -vomiting a lot (resolved)  3. ER visits/Hospitalizations/surgeries - Yes  4. Last PCP visit: has an apt. today    Patient still getting double vision. Floaters present.     Therapist recommended psychiatry evaluation. Patient does not want to see psychaitry. Patient will see PCP today.     August 22, 2017  Have you ever seen a rheumatologist Yes Who You When 5/17/17    NO genital ulcers in the interim.   Mouth ulcers- three in the back of the throat and roof of the mouth last month.     Joint pain history  Onset: doing alittle worse / cut her otezla back to 30mg  Daily due to GI problems  Involved joints: all over her body. Been having more black out episodes, been having more chest pains.also has raised bumps on both legs from the knees down that itch and are painful   Pain scale:  5.5/10     Wakes the patient from sleep : Yes  Morning stiffness:Yes for 120 minutes  Meds used:otezla,  colchicine (three pills daily)     Interim history  Since last visit:  1. Infections - Yes stomach issue  2. New symptoms/medical problem - No  3. Any side effects from Rheum medications -nausea from otezla  3. ER visits/Hospitalizations/surgeries - Yes, ER for foot, ankle injury from passing out and fell down the steps. Hit her head another time from passing out. Alcohol poisoning in July 4. Last PCP visit: 6/23/17 September 19, 2017  Have you ever seen a rheumatologist Yes Who You When 8/22/17  Joint pain history  Onset: pt states that she hurts everywhere for 6 days  Involved joints: all joints  Pain scale:  7/10     Wakes the patient from sleep : Yes/ not sleeping at all  Morning stiffness:Yes for 180 minutes  Meds used:colchicine, otezla, magic mouth wash, lidocaine gel  Last one week: increased joint pain; and genital ulcer  Genital lesion (dimed size) in the last one week  After botox a week ago, she had fever 101 for three days; near syncopes. Back pain; floaters  Chest pain , mouth sores, hand pain, morning pain were all better with otezla 30mg twice daily.    Interim history  Since last visit:  1. Infections - No  2. New symptoms/medical problem - No  3. Any side effects from Rheum medications -nausea from the otezla  3. ER visits/Hospitalizations/surgeries - No  4. Last PCP visit: may 2017     October 18, 2017     Have you ever seen a rheumatologist Yes Who You When 9/19/17  Joint pain history  NO mouth or genital sores in the last 4 weeks.   Medrol dosepak helped with visual symptoms but not joint pains.     Onset: pt states that she is doing better than last time with her behcet's. Today has severe stomach pains, states that she is constipated. Pt had a seizure 2 days ago. Does have a metallic taste in her mouth  Involved joints: hands , neck, shoulders  Pain scale:  9/10 from stomach pain;      Wakes the patient from sleep : Yes  Morning stiffness:Yes for 120 minutes  Meds used:cochicine , otezla  30mg twice daily     Interim history  Since last visit:  1. Infections - No  2. New symptoms/medical problem - Yes/ the cartilage in her chest is inflamed  3. Any side effects from Rheum medications -nausea from the otezla  3. ER visits/Hospitalizations/surgeries - No  4. Last PCP visit: yesterday    January 16, 2018  Have you ever seen a rheumatologist Yes Who You When 10/18/17  Joint pain history  Onset: pt states that she was in the hospital for 5 days before maribell, they told her she had lesions in her brain in the white matter. Had blood work drawn in the ER and they told her her inflammatory markers were elevated. Was seen in the ER last week for vomiting and diarrhea, not eating. Saw Gastro. On 1/10/18. 1.5 weeks ago she had an endoscopy and colonoscopy. She has been having elbow  And hands and knee pain, loosing use of her hands. Been dropping things. Also states that she has been getting lesions up her nose  Involved joints: see above  Pain scale:  8/10     Wakes the patient from sleep : Yes  Morning stiffness:Yes for 120 minutes  Meds used:colchisine, otezla, magic mouth wash, kenolog cream, lidocaine gel     Interim history  Since last visit:  1. Infections - Yes/ had an infection in her jaw. Having sinus issues  2. New symptoms/medical problem - Yes/ states that she is having problems walking, states that she was bouncing when she was walking  3. Any side effects from Rheum medications -otezla, nausea  3. ER visits/Hospitalizations/surgeries - Yes/ see chart  4. Last PCP visit: November 2017 April 17, 2018  Have you ever seen a rheumatologist Yes Who You When 1/16/18  Joint pain history  Onset: pt states that she is not doing well. She is having increased stomach issues, only eats 2 times in 4 days unable to keep the otezla down due to vomiting . Has sleep study done in 1 week; no genital ulcers since last appointment. 6 small mouth sores since last appointment.   Involved joints: see above   Pain  scale:  7/10     Wakes the patient from sleep : Yes  Morning stiffness:Yes for 60 minutes  Meds used:otezla , colchicine, diclofenac gel, magic mouthwash, lidocaine gel      Interim history  Since last visit:  1. Infections - Yes/ had a sinus infection, thinks she is getting sick now  2. New symptoms/medical problem - Yes/ neurology sent pt to cardiology. Has a heart condition but not sure what . She is seeing a ortho. Due to knee pain   3. Any side effects from Rheum medications -nausea/vomiting from the otezla   3. ER visits/Hospitalizations/surgeries - Yes/ seen in ER   4. Last PCP visit: yesterday    July 17, 2018  Have you ever seen a rheumatologist Yes Who You When 4/17/18  Joint pain history  Onset: pt Is here for a follow-up states that she started to get lesions on her legs , face and arm. Hurts all over, lower back is very painful. Right hand and wrist area are numb  Involved joints: see above  Pain scale:  7/10   worse in the morning  Wakes the patient from sleep : Yes/ does not go to sleep till late  Morning stiffness:Yes for 4-5 hours minutes  Meds used:colchicine, otezla has  Not started otezla yet due to extreme nausea      Interim history  Since last visit:  1. Infections - Yes/ had a bacterial infection in her pelvic area was hospitalized  2. New symptoms/medical problem - Yes/ hands are swelling up. Having orthopedic issues. Was having problem with her veins sticking up, and very painful. Has happened multiply times   3. Any side effects from Rheum medications -see above  3. ER visits/Hospitalizations/surgeries - Yes/ hospital and ER for bacterial infection  4. 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 ER said may have blood clot in calf, but when did MRI, stated body may have broken it up on it's own. Elevated D-dimer  Involved joints:  Knees, neck, hands  Pain scale:  6.5/10     Wakes the patient from sleep : Yes  Morning stiffness:Yes for 180 minutes  Meds used:hyoscyamine, not taking otezla. Last dose Sep. Patient did not want to take otezla with the antibiotics.     Last major mouth ulcer- aug or Sep  No genital ulcers in the last 6 months.      Interim history  Since last visit:  1. Infections - Yes, UTI's twice a month since last visit  2. New symptoms/medical problem - No  3. Any side effects from Rheum medications -otzela causes nausea  3. ER visits/Hospitalizations/surgeries - Yes, 1/2/19 repaired some ligaments and mass taken out, also joint build up removed from a previous injury  4. Last PCP visit: 12/5/19         Past Medical History:     Past Medical History:   Diagnosis Date     Anemia      Anxiety      Arthritis      Behcet's disease (H)      Cervical adenitis May 2010     Chronic abdominal pain      Constipation, chronic 1994     Fibromyalgia      Gastro-oesophageal reflux disease      Gastroparesis      Irregular heart beat     tachycardia, has had workup     Migraines      Neuromuscular disorder (H)     fibramyalgia     Palpitations      Seizure (H)      Seizures (H)     unknown etiology     Syncope      Tourette's      Past Surgical History:   Procedure Laterality Date     ARTHROSCOPY ANKLE, REPAIR LIGAMENT Left 1/2/2019    Procedure: Ankle arthroscopy and sinus tarsi evacuation, ligament repair, left lower extremity;  Surgeon: Andres Johnson DPM;  Location:  OR     ARTHROSCOPY KNEE WITH PATELLAR REALIGNMENT  7/25/2013    Procedure: ARTHROSCOPY KNEE WITH PATELLAR REALIGNMENT;  Left Knee Arthroscopy, Medial Patellofemoral Ligament Reconstruction with Allograft  ;  Surgeon: Jennifer Acevedo MD;  Location:  OR     COLONOSCOPY  2015     DENTAL SURGERY  1996    Teeth removal     ENDOSCOPY UPPER, COLONOSCOPY, COMBINED  2005      ESOPH/GAS REFLUX TEST W NASAL IMPED >1 HR N/A 2/15/2017    Procedure: ESOPHAGEAL IMPEDENCE  FUNCTION TEST WITH 24 HOUR PH GREATER THAN 1 HOUR;  Surgeon: Timothy Matta MD;  Location:  GI          Social History:     Social History     Occupational History     Not on file   Tobacco Use     Smoking status: Never Smoker     Smokeless tobacco: Never Used   Substance and Sexual Activity     Alcohol use: Yes     Alcohol/week: 0.6 oz     Types: 1 Standard drinks or equivalent per week     Comment: rarely     Drug use: No     Sexual activity: Yes     Partners: Male     Birth control/protection: Pill          Family History:     Family History   Problem Relation Age of Onset     Depression Mother      Neurologic Disorder Mother         Migraines, take imitrex injection.  Also in maternal grandmother.       Alcohol/Drug Father      Hypertension Father      Depression Father      Osteoarthritis Father      Cardiovascular Maternal Grandmother      Depression Maternal Grandmother      Hypertension Maternal Grandmother      Alzheimer Disease Maternal Grandmother      Cardiovascular Maternal Grandfather      Hypertension Maternal Grandfather      Depression Maternal Grandfather      Alcohol/Drug Maternal Grandfather      Cardiovascular Paternal Grandmother      Hypertension Paternal Grandmother      Cardiovascular Paternal Grandfather      Hypertension Paternal Grandfather      Glaucoma No family hx of      Macular Degeneration No family hx of           Allergies:     Allergies   Allergen Reactions     Amoxil [Penicillins] Rash     Dad unsure of reaction.     Bee Venom Anaphylaxis     Contrast Dye Rash     Contrast Media Ready-Box [facility] [place], 04/09/2014.; Contrast Media Ready-Box [facility] [place], 04/09/2014.  NOTE: this is a contrast media oral with iodine. Premedicate with methylpred standard for IV contrast, request barium contrast for oral contrast.     Kiwi Swelling     Orange Fruit [Citrus] Anaphylaxis     Pineapple Anaphylaxis, Difficulty breathing and Rash     Reglan [Metoclopramide] Other (See Comments)     IV dose only,  in ER, rapid heart rate.     Ace Inhibitors      Difficulty in breathing and GI upset     Amitiza [Lubiprostone] Nausea and Vomiting     Amoxicillin-Pot Clavulanate      Latex      Midazolam Unknown     Other reaction(s): Unknown  parent states that when pt takes this medication, she wakes up being very violent .  parent states that when pt takes this medication, she wakes up being very violent .     No Clinical Screening - See Comments      Bleech/ chest tightness, itchy throat, swollen tongue, hives     Versed      Coming out of pelvic exam at age of 5, was kicking and screaming when coming out of the versed.     Adhesive Tape Rash     Azithromycin Hives and Rash     Cephalexin Itching and Rash     Itchy mouth  Itchy mouth     Keflex [Cephalexin-Fd&C Yellow #6] Hives          Medications:     Current Outpatient Medications   Medication Sig Dispense Refill     albuterol (PROAIR HFA/PROVENTIL HFA/VENTOLIN HFA) 108 (90 BASE) MCG/ACT Inhaler Inhale 2 puffs into the lungs every 6 hours as needed for shortness of breath / dyspnea or wheezing 1 Inhaler 1     amitriptyline (ELAVIL) 50 MG tablet Take 1 tablet (50 mg) by mouth At Bedtime 30 tablet 11     artificial tears OINT ophthalmic ointment 0.5 inch strip each eye at night 1 Tube 11     aspirin (ASPIRIN CHILDRENS) 81 MG chewable tablet Take 81 mg by mouth as needed        benzocaine (TOPICALE XTRA) 20 % GEL Apply as needed locally to mouth or nasal ulcers for pain; 4 times daily as needed 30 g 1     betamethasone valerate (VALISONE) 0.1 % cream Apply topically 2 times daily       botulinum toxin type A (BOTOX) 100 UNITS injection Inject 200 Units into the muscle every 3 months 200 Units 3     clobetasol (TEMOVATE) 0.05 % cream Apply topically 2 times daily 60 g 0     colchicine (COLCYRS) 0.6 MG tablet Take 0.6 mg in AM and 0.6mg in PM every day 180 tablet 0     cyproheptadine (PERIACTIN) 4 MG tablet Take 1 tablet (4 mg) by mouth At Bedtime For nightmares 30 tablet 2      diclofenac (VOLTAREN) 1 % GEL topical gel Apply 2-4 grams to affected area(s) up to 4 times per day as needed. This is an anti-inflammatory medication. 100 g 4     dicyclomine (BENTYL) 20 MG tablet Take 1 tablet (20 mg) by mouth 4 times daily as needed 120 tablet 3     diltiazem 2% in PLO cream, FV COMPOUNDED, 2% GEL Apply small pea size amount three times daily to anus until pain is gone. 30 g 1     EPINEPHrine (EPIPEN 2-EAMON) 0.3 MG/0.3ML injection Inject 0.3 mLs (0.3 mg) into the muscle as needed for anaphylaxis 0.6 mL 3     guanFACINE (TENEX) 1 MG tablet Take 3 tablets (3 mg) by mouth twice daily in the morning and evening. 180 tablet 3     hydrocortisone 1 % CREA cream Place rectally 2 times daily as needed for itching 28.35 g 1     hydrOXYzine (VISTARIL) 25 MG capsule Take 1-2 tablets every 4-6 hours as needed for pain control. 15 capsule 0     hydrOXYzine (VISTARIL) 25 MG capsule Take 1-2 tablets every 4-6 hours as needed for pain control. 25 capsule 0     hyoscyamine (ANASPAZ/LEVSIN) 0.125 MG tablet Take 1-2 tablets (125-250 mcg) by mouth every 4 hours as needed for cramping 40 tablet 1     hypromellose (GENTEAL) 0.3 % SOLN 1 drop every hour as needed for dry eyes        ibuprofen (ADVIL/MOTRIN) 600 MG tablet Take 600mg of ibuprofen every 6 hours x 5 days to assist in pain/inflammation control.  If you are not tolerating the medication, you are ok to stop. 20 tablet 0     Lactase (LACTAID PO) Take by mouth daily       lactulose 20 GM/30ML SOLN Take 30 mLs by mouth 3 times daily as needed (for constipation) 300 mL 3     lidocaine (XYLOCAINE) 2 % jelly Apply 1 Tube topically daily Reported on 4/21/2017       linaclotide (LINZESS) 145 MCG capsule Take 1 capsule (145 mcg) by mouth every morning (before breakfast) 90 capsule 1     Magic Mouthwash (FV std formula) lidocaine visc 2% 2.5mL/5mL & maalox/mylanta w/ simeth 2.5mL/5mL & diphenhydrAMINE 5mg/5mL Swish and swallow 10 mLs in mouth every 6 hours as needed  for mouth sores 1 Bottle 1     Magnesium Aspartate HCl 1230 MG PACK Take 1 packet by mouth daily as needed 30 each 3     nitroFURantoin macrocrystal-monohydrate (MACROBID) 100 MG capsule Take 1 capsule (100 mg) by mouth At Bedtime 90 capsule 1     norgestimate-ethinyl estradiol (ORTHO-CYCLEN/SPRINTEC) 0.25-35 MG-MCG tablet Take 1 tablet by mouth daily 84 tablet 4     omeprazole (PRILOSEC) 40 MG capsule Take 1 capsule (40 mg) by mouth daily 90 capsule 3     OnabotulinumtoxinA (BOTOX IJ) Inject 175 Units into the muscle Lot: [id number]  Exp: 01/2021       OnabotulinumtoxinA (BOTOX IJ) Inject 175 Units into the muscle once Lot # /C3 with Expiration Date:  11/2020       OnabotulinumtoxinA (BOTOX IJ) Inject 165 Units as directed once Lot#: /C3  Exp: 10/2020       OnabotulinumtoxinA (BOTOX IJ) Inject 165 Units as directed once Lot # /C3   Exp:  10/2020       OnabotulinumtoxinA (BOTOX IJ) Inject 165 Units into the muscle once Lot /C3  Exp 06/2020       ondansetron (ZOFRAN) 8 MG tablet Take 1 tablet (8 mg) by mouth every 8 hours as needed for nausea 60 tablet 1     order for DME Equipment being ordered: RollAbout knee scooter 1 Device 0     order for DME Equipment being ordered: adult pair of crutches 1 Device 0     oxyCODONE-acetaminophen (PERCOCET) 5-325 MG tablet Take 1-2 tablets every 4-6 hours as needed for pain.  Do not take other tylenol products with this medication, as too much tylenol can be damaging to the liver. 15 tablet 0     oxyCODONE-acetaminophen (PERCOCET) 5-325 MG tablet Take 1-2 tablets every 4-6 hours as needed for pain.  Do not take other tylenol products with this medication, as too much tylenol can be damaging to the liver. 25 tablet 0     polyethylene glycol (MIRALAX/GLYCOLAX) powder Take 1 capful by mouth 3 times daily       sennosides (SENOKOT) 8.6 MG tablet Take 1 tablet by mouth daily       sucralfate (CARAFATE) 1 GM/10ML suspension Take 10 mLs (1 g) by mouth 4 times daily 1200  "mL 2     triamcinolone (KENALOG) 0.1 % cream Apply sparingly to lesions twice daily as needed 80 g 1          Physical Exam:   Blood pressure 108/74, pulse 91, temperature 97.9  F (36.6  C), temperature source Oral, height 1.588 m (5' 2.5\"), weight 68.9 kg (152 lb), SpO2 100 %, not currently breastfeeding.  Wt Readings from Last 4 Encounters:   01/09/19 68.9 kg (152 lb)   01/08/19 68.9 kg (152 lb)   01/02/19 68.9 kg (152 lb)   12/18/18 70 kg (154 lb 4.8 oz)     Constitutional: well-developed, appearing stated age; cooperative  ENT: No oral ulcer seen.   No mucous membrane lesions, normal saliva pool  Resp: lungs clear to auscultation  MS: Left foot boot s/p ankle surgery.   All shoulder, elbow, wrist, MCP/PIP/DIP, hip, ankle joints were examined and  found normal. No active synovitis or deformity.   Skin: no rash in exposed areas  Psych: nl judgement, orientation, memory, affect.         Data:     CBC RESULTS:   Recent Labs   Lab Test  06/06/17 2048   WBC  4.7   RBC  4.09   HGB  12.0   HCT  35.9   MCV  88   MCH  29.3   MCHC  33.4   RDW  13.1   PLT  189       Liver Function Studies -   Recent Labs   Lab Test  06/06/17 2048   PROTTOTAL  6.7*   ALBUMIN  3.8   BILITOTAL  0.3   ALKPHOS  91   AST  26   ALT  44       Creatinine   Date Value Ref Range Status   12/12/2018 0.67 0.52 - 1.04 mg/dL Final   ]    No results found for: URIC]    HLA-B27  No results for input(s): U78HKODDJH, B1 in the last 27794 hours.  RF/CCP  Recent Labs   Lab Test  05/06/16   1554   CCPIGG  1   RHF  <20     CYNDIE/RNP/Sm/SSA/SSB  Recent Labs   Lab Test  11/01/16   1318   TREPAB  Negative     ESR/CRP  Recent Labs   Lab Test  04/21/17   1249  04/14/17   1351  11/06/16   1341  03/11/16   1350  11/18/15   1453   SED   --    --   4  5  6   CRP  <2.9  <2.9  <2.9  <2.9  <2.9       h        "

## 2019-01-09 NOTE — NURSING NOTE
"Chief Complaint   Patient presents with     RECHECK       Initial /74   Pulse 91   Temp 97.9  F (36.6  C) (Oral)   Ht 1.588 m (5' 2.5\")   Wt 68.9 kg (152 lb)   LMP  (LMP Unknown)   SpO2 100%   BMI 27.36 kg/m   Estimated body mass index is 27.36 kg/m  as calculated from the following:    Height as of this encounter: 1.588 m (5' 2.5\").    Weight as of this encounter: 68.9 kg (152 lb).  Medication Reconciliation: complete    Have you ever seen a rheumatologist yes Who you When 7/17/18  Joint pain history  Onset: Patient is here for a follow up on Behcet's disease, and fibromyalgia. ER said may have blood clot in calf, but when did MRI, stated body may have broken it up on it's own. Elevated D-dimer  Involved joints: Knees, neck, hands  Pain scale:  6.5/10     Wakes the patient from sleep : Yes  Morning stiffness:Yes for 180 minutes  Meds used:hyoscyamine, otzela     Interim history  Since last visit:  1. Infections - Yes, UTI's twice a month since last visit  2. New symptoms/medical problem - No  3. Any side effects from Rheum medications -otzela causes nausea  3. ER visits/Hospitalizations/surgeries - Yes, 1/2/19 repaired some ligaments and mass taken out, also joint build up removed from a previous injury  4. Last PCP visit: 12/5/19  Wt Readings from Last 4 Encounters:   01/09/19 68.9 kg (152 lb)   01/08/19 68.9 kg (152 lb)   01/02/19 68.9 kg (152 lb)   12/18/18 70 kg (154 lb 4.8 oz)     BP Readings from Last 3 Encounters:   01/09/19 108/74   01/02/19 130/76   12/18/18 122/70       "

## 2019-01-15 ENCOUNTER — MYC REFILL (OUTPATIENT)
Dept: FAMILY MEDICINE | Facility: CLINIC | Age: 24
End: 2019-01-15

## 2019-01-15 ENCOUNTER — OFFICE VISIT (OUTPATIENT)
Dept: PODIATRY | Facility: CLINIC | Age: 24
End: 2019-01-15
Payer: COMMERCIAL

## 2019-01-15 VITALS — BODY MASS INDEX: 26.93 KG/M2 | RESPIRATION RATE: 18 BRPM | WEIGHT: 152 LBS | HEIGHT: 63 IN

## 2019-01-15 DIAGNOSIS — Z98.890 S/P ANKLE LIGAMENT REPAIR: Primary | ICD-10-CM

## 2019-01-15 DIAGNOSIS — F95.9 TIC: ICD-10-CM

## 2019-01-15 PROCEDURE — 99024 POSTOP FOLLOW-UP VISIT: CPT | Performed by: PODIATRIST

## 2019-01-15 RX ORDER — GUANFACINE 1 MG/1
TABLET ORAL
Qty: 180 TABLET | Refills: 3 | Status: SHIPPED | OUTPATIENT
Start: 2019-01-15 | End: 2019-05-06

## 2019-01-15 ASSESSMENT — MIFFLIN-ST. JEOR: SCORE: 1400.66

## 2019-01-15 NOTE — TELEPHONE ENCOUNTER
"Requested Prescriptions   Pending Prescriptions Disp Refills     guanFACINE (TENEX) 1 MG tablet 180 tablet 3     Sig: Take 3 tablets (3 mg) by mouth twice daily in the morning and evening.    Antiadrenergic Antihypertensives Passed - 1/15/2019  4:30 PM       Passed - Blood pressure less than 140/90 in past 6 months    BP Readings from Last 3 Encounters:   01/09/19 108/74   01/02/19 130/76   12/18/18 122/70                Passed - Medication is active on med list       Passed - Patient is age 18 or older       Passed - No active pregnancy on record       Passed - Normal serum creatinine on file in past 12 months    Recent Labs   Lab Test 12/12/18  1527   CR 0.67            Passed - No positive pregnancy test within past 12 months       Passed - Recent (6 mo) or future (30 days) visit within the authorizing provider's specialty    Patient had office visit in the last 6 months or has a visit in the next 30 days with authorizing provider or within the authorizing provider's specialty.  See \"Patient Info\" tab in inbasket, or \"Choose Columns\" in Meds & Orders section of the refill encounter.            "

## 2019-01-15 NOTE — TELEPHONE ENCOUNTER
Prescription approved per [facility] Refill Protocol. RX sent 12/05/18, local print. Pt states that she does not have a paper prescription and doesn't remember giving it to the pharmacy.    Criselda Sanabria RN  [facility] [place]

## 2019-01-22 ENCOUNTER — OFFICE VISIT (OUTPATIENT)
Dept: PODIATRY | Facility: CLINIC | Age: 24
End: 2019-01-22
Payer: COMMERCIAL

## 2019-01-22 VITALS — BODY MASS INDEX: 26.93 KG/M2 | RESPIRATION RATE: 12 BRPM | WEIGHT: 152 LBS | HEIGHT: 63 IN

## 2019-01-22 DIAGNOSIS — Z98.890 S/P ANKLE LIGAMENT REPAIR: Primary | ICD-10-CM

## 2019-01-22 PROCEDURE — 99024 POSTOP FOLLOW-UP VISIT: CPT | Performed by: PODIATRIST

## 2019-01-22 ASSESSMENT — MIFFLIN-ST. JEOR: SCORE: 1400.66

## 2019-01-22 NOTE — PROGRESS NOTES
"Foot & Ankle Surgery  January 22, 2019    S:  Patient in today approx 3 weeks sp ankle scope and ATFL open repair with excision of mass from sinus tarsi.  Pain levels improving.  Noticed some cramping in her foot but no other symptoms noted    Resp 12   Ht 1.588 m (5' 2.5\")   Wt 68.9 kg (152 lb)   LMP  (LMP Unknown)   BMI 27.36 kg/m        ROS - positive for CC.  Patient denies current nausea, vomiting, chills, fevers, belly pain, calf pain, chest pain or SOB.  Complete remainder of ROS is otherwise neg.    PE - medial portal incision stable without drainage/SOI.  lateral incision healing well.  Sutures removed, s-s applied as a precaution but no gapping/dehiscence noted.  Moderate bruising lateral heel.  Edema levels low anterior ankle.   Skin shows no trophic, color or temperature changes otherwise.  No calf redness, swelling or pain noted otherwise.    A/P - 23 year old yo patient approx 3 weeks sp above procedure  -sutures out, s-s applied.  Remove in 1 week if still present  -continue - compression, tensogrip dispensed  -change - ok to bear weight on heel in boot(dispensed) with crutches, increase to tolerance; ice/elevate prn; ok to increase activities to tolerance;   -[facility] PT referral to start functional rehab    Follow up  -  3 weeks or sooner with acute issues    Body mass index is 27.36 kg/m .  Weight management plan: Patient was referred to their PCP to discuss a diet and exercise plan.      Andres Johnson DPM FACFAS FACFAOM  Podiatric Foot & Ankle Surgeon  [facility]  [phone]    "

## 2019-01-22 NOTE — Clinical Note
Santos Em saw Samara today, 3 weeks out from ankle scope, ATFL repair, and excision of mass from sinus tarsi.  Sutures were removed, and she was referred to PT for functional rehab.  She'll start bearing weight on the foot in a boot to tolerance, and will follow up in 3 weeks for her next recheck.Opal

## 2019-01-23 ENCOUNTER — OFFICE VISIT (OUTPATIENT)
Dept: PSYCHOLOGY | Facility: CLINIC | Age: 24
End: 2019-01-23
Payer: COMMERCIAL

## 2019-01-23 DIAGNOSIS — F41.1 GAD (GENERALIZED ANXIETY DISORDER): ICD-10-CM

## 2019-01-23 DIAGNOSIS — F33.1 MAJOR DEPRESSIVE DISORDER, RECURRENT EPISODE, MODERATE (H): Primary | ICD-10-CM

## 2019-01-23 PROCEDURE — 90834 PSYTX W PT 45 MINUTES: CPT | Performed by: COUNSELOR

## 2019-01-23 ASSESSMENT — ANXIETY QUESTIONNAIRES
1. FEELING NERVOUS, ANXIOUS, OR ON EDGE: MORE THAN HALF THE DAYS
7. FEELING AFRAID AS IF SOMETHING AWFUL MIGHT HAPPEN: SEVERAL DAYS
5. BEING SO RESTLESS THAT IT IS HARD TO SIT STILL: MORE THAN HALF THE DAYS
3. WORRYING TOO MUCH ABOUT DIFFERENT THINGS: SEVERAL DAYS
GAD7 TOTAL SCORE: 12
6. BECOMING EASILY ANNOYED OR IRRITABLE: MORE THAN HALF THE DAYS
2. NOT BEING ABLE TO STOP OR CONTROL WORRYING: MORE THAN HALF THE DAYS
IF YOU CHECKED OFF ANY PROBLEMS ON THIS QUESTIONNAIRE, HOW DIFFICULT HAVE THESE PROBLEMS MADE IT FOR YOU TO DO YOUR WORK, TAKE CARE OF THINGS AT HOME, OR GET ALONG WITH OTHER PEOPLE: SOMEWHAT DIFFICULT

## 2019-01-23 ASSESSMENT — PATIENT HEALTH QUESTIONNAIRE - PHQ9
SUM OF ALL RESPONSES TO PHQ QUESTIONS 1-9: 11
5. POOR APPETITE OR OVEREATING: MORE THAN HALF THE DAYS
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
## 2019-01-24 ENCOUNTER — MYC MEDICAL ADVICE (OUTPATIENT)
Dept: PODIATRY | Facility: CLINIC | Age: 24
End: 2019-01-24

## 2019-01-24 DIAGNOSIS — Z98.890 S/P ANKLE LIGAMENT REPAIR: Primary | ICD-10-CM

## 2019-01-24 ASSESSMENT — ANXIETY QUESTIONNAIRES: GAD7 TOTAL SCORE: 12

## 2019-01-24 NOTE — TELEPHONE ENCOUNTER
Please see [employer] request for new orders for physical therapy at [facility].     Order started and pending completion.    BRIDGER Juares RN

## 2019-01-25 NOTE — TELEPHONE ENCOUNTER
Order signed for external PT referral    Andres Johnson, ROSIBEL FACFAS FACFAOM  Podiatric Foot & Ankle Surgeon  [facility]  [phone]

## 2019-01-28 ENCOUNTER — TELEPHONE (OUTPATIENT)
Dept: PODIATRY | Facility: CLINIC | Age: 24
End: 2019-01-28

## 2019-01-28 NOTE — TELEPHONE ENCOUNTER
Reason for Call: Request for an order or referral:    Order or referral being requested: PT to be done at [facility]    Fax #[phone]      Date needed: as soon as possible    Has the patient been seen by the PCP for this problem? YES    Additional comments: [facility] is calling and they need a referral to be faxed over for PT    Phone number Patient can be reached at:  [phone] (O number)    Best Time:  any    Can we leave a detailed message on this number?  YES    Call taken on 1/28/2019 at 1:12 PM by Lillian Cruz

## 2019-01-28 NOTE — TELEPHONE ENCOUNTER
Order was faxed 1/25/18; will fax again today.    Andres Johnson DPM FACFAS FACFAOM  Podiatric Foot & Ankle Surgeon  [facility]  [phone]

## 2019-01-31 ENCOUNTER — MYC MEDICAL ADVICE (OUTPATIENT)
Dept: PODIATRY | Facility: CLINIC | Age: 24
End: 2019-01-31

## 2019-01-31 DIAGNOSIS — L02.619 CELLULITIS AND ABSCESS OF FOOT, EXCEPT TOES: Primary | ICD-10-CM

## 2019-01-31 DIAGNOSIS — L03.119 CELLULITIS AND ABSCESS OF FOOT, EXCEPT TOES: Primary | ICD-10-CM

## 2019-01-31 RX ORDER — CLINDAMYCIN HCL 300 MG
300 CAPSULE ORAL 3 TIMES DAILY
Qty: 30 CAPSULE | Refills: 0 | Status: ON HOLD | OUTPATIENT
Start: 2019-01-31 | End: 2019-03-15

## 2019-01-31 NOTE — TELEPHONE ENCOUNTER
I called and LVM with Samara.  Advised washing and drying thoroughly(no soaking), applying antibiotic ointment and a bandaid to any open areas of the incision, and I sent a Rx for Clindamycin to her [facility] pharmacy.  I mentioned I will be out of the office tomorrow but an on-call provider can be reached.    Andres Johnson, [facility]STIN[facility] FACFA  Podiatric Foot & Ankle Surgeon  [facility]  [phone]

## 2019-01-31 NOTE — TELEPHONE ENCOUNTER
Please see mychart message.     Patient had ATFL open repair with excision of mass from sinus tarsi on 1/2/19 by Dr. Johnson.   Consent to communicate on file.   Left voicemail for patient to return call to discuss her symptoms in more detail.     Routing to provider also while awaiting call back.     BRIDGER Juares RN

## 2019-02-05 ENCOUNTER — OFFICE VISIT (OUTPATIENT)
Dept: PHYSICAL MEDICINE AND REHAB | Facility: CLINIC | Age: 24
End: 2019-02-05
Payer: COMMERCIAL

## 2019-02-05 VITALS
HEART RATE: 128 BPM | DIASTOLIC BLOOD PRESSURE: 88 MMHG | OXYGEN SATURATION: 99 % | SYSTOLIC BLOOD PRESSURE: 131 MMHG | TEMPERATURE: 97.7 F

## 2019-02-05 DIAGNOSIS — G43.719 INTRACTABLE CHRONIC MIGRAINE WITHOUT AURA AND WITHOUT STATUS MIGRAINOSUS: Primary | ICD-10-CM

## 2019-02-05 ASSESSMENT — PAIN SCALES - GENERAL: PAINLEVEL: SEVERE PAIN (6)

## 2019-02-05 NOTE — PROGRESS NOTES
BOTULINUM TOXIN PROCEDURE - HEADACHE - NOTE    Chief Complaint   Patient presents with     RECHECK     [facility] RETURN BOTOX      /88 (BP Location: Left arm, Patient Position: Chair, Cuff Size: Adult Regular)   Pulse 128   Temp 97.7  F (36.5  C) (Oral)   SpO2 99%      Current Outpatient Medications:      albuterol (PROAIR HFA/PROVENTIL HFA/VENTOLIN HFA) 108 (90 BASE) MCG/ACT Inhaler, Inhale 2 puffs into the lungs every 6 hours as needed for shortness of breath / dyspnea or wheezing, Disp: 1 Inhaler, Rfl: 1     amitriptyline (ELAVIL) 50 MG tablet, Take 1 tablet (50 mg) by mouth At Bedtime, Disp: 30 tablet, Rfl: 11     artificial tears OINT ophthalmic ointment, 0.5 inch strip each eye at night, Disp: 1 Tube, Rfl: 11     aspirin (ASPIRIN CHILDRENS) 81 MG chewable tablet, Take 81 mg by mouth as needed , Disp: , Rfl:      benzocaine (TOPICALE XTRA) 20 % GEL, Apply as needed locally to mouth or nasal ulcers for pain; 4 times daily as needed, Disp: 30 g, Rfl: 1     betamethasone valerate (VALISONE) 0.1 % cream, Apply topically 2 times daily, Disp: , Rfl:      botulinum toxin type A (BOTOX) 100 UNITS injection, Inject 200 Units into the muscle every 3 months, Disp: 200 Units, Rfl: 3     clindamycin (CLEOCIN) 300 MG capsule, Take 1 capsule (300 mg) by mouth 3 times daily for 10 days, Disp: 30 capsule, Rfl: 0     clobetasol (TEMOVATE) 0.05 % cream, Apply topically 2 times daily, Disp: 60 g, Rfl: 0     colchicine (COLCYRS) 0.6 MG tablet, Take 0.6 mg in AM and 0.6mg in PM every day, Disp: 180 tablet, Rfl: 0     cyproheptadine (PERIACTIN) 4 MG tablet, Take 1 tablet (4 mg) by mouth At Bedtime For nightmares, Disp: 30 tablet, Rfl: 2     diclofenac (VOLTAREN) 1 % GEL topical gel, Apply 2-4 grams to affected area(s) up to 4 times per day as needed. This is an anti-inflammatory medication., Disp: 100 g, Rfl: 4     dicyclomine (BENTYL) 20 MG tablet, Take 1 tablet (20 mg) by mouth 4 times daily as needed, Disp: 120 tablet, Rfl:  3     diltiazem 2% in PLO cream, FV COMPOUNDED, 2% GEL, Apply small pea size amount three times daily to anus until pain is gone., Disp: 30 g, Rfl: 1     EPINEPHrine (EPIPEN 2-EAMON) 0.3 MG/0.3ML injection, Inject 0.3 mLs (0.3 mg) into the muscle as needed for anaphylaxis, Disp: 0.6 mL, Rfl: 3     guanFACINE (TENEX) 1 MG tablet, Take 3 tablets (3 mg) by mouth twice daily in the morning and evening., Disp: 180 tablet, Rfl: 3     hydrocortisone 1 % CREA cream, Place rectally 2 times daily as needed for itching, Disp: 28.35 g, Rfl: 1     hydrOXYzine (VISTARIL) 25 MG capsule, Take 1-2 tablets every 4-6 hours as needed for pain control., Disp: 15 capsule, Rfl: 0     hydrOXYzine (VISTARIL) 25 MG capsule, Take 1-2 tablets every 4-6 hours as needed for pain control., Disp: 25 capsule, Rfl: 0     hyoscyamine (ANASPAZ/LEVSIN) 0.125 MG tablet, Take 1-2 tablets (125-250 mcg) by mouth every 4 hours as needed for cramping, Disp: 40 tablet, Rfl: 1     hypromellose (GENTEAL) 0.3 % SOLN, 1 drop every hour as needed for dry eyes , Disp: , Rfl:      ibuprofen (ADVIL/MOTRIN) 600 MG tablet, Take 600mg of ibuprofen every 6 hours x 5 days to assist in pain/inflammation control.  If you are not tolerating the medication, you are ok to stop., Disp: 20 tablet, Rfl: 0     Lactase (LACTAID PO), Take by mouth daily, Disp: , Rfl:      lactulose 20 GM/30ML SOLN, Take 30 mLs by mouth 3 times daily as needed (for constipation), Disp: 300 mL, Rfl: 3     lidocaine (XYLOCAINE) 2 % jelly, Apply 1 Tube topically daily Reported on 4/21/2017, Disp: , Rfl:      linaclotide (LINZESS) 145 MCG capsule, Take 1 capsule (145 mcg) by mouth every morning (before breakfast), Disp: 90 capsule, Rfl: 1     Magic Mouthwash (FV std formula) lidocaine visc 2% 2.5mL/5mL & maalox/mylanta w/ simeth 2.5mL/5mL & diphenhydrAMINE 5mg/5mL, Swish and swallow 10 mLs in mouth every 6 hours as needed for mouth sores, Disp: 1 Bottle, Rfl: 1     nitroFURantoin macrocrystal-monohydrate  (MACROBID) 100 MG capsule, Take 1 capsule (100 mg) by mouth At Bedtime, Disp: 90 capsule, Rfl: 1     norgestimate-ethinyl estradiol (ORTHO-CYCLEN/SPRINTEC) 0.25-35 MG-MCG tablet, Take 1 tablet by mouth daily, Disp: 84 tablet, Rfl: 4     omeprazole (PRILOSEC) 40 MG capsule, Take 1 capsule (40 mg) by mouth daily, Disp: 90 capsule, Rfl: 3     OnabotulinumtoxinA (BOTOX IJ), Inject 175 Units into the muscle Lot: [id number] Exp: 01/2021, Disp: , Rfl:      OnabotulinumtoxinA (BOTOX IJ), Inject 175 Units into the muscle once Lot # /C3 with Expiration Date:  11/2020, Disp: , Rfl:      OnabotulinumtoxinA (BOTOX IJ), Inject 165 Units as directed once Lot#: /C3 Exp: 10/2020, Disp: , Rfl:      OnabotulinumtoxinA (BOTOX IJ), Inject 165 Units as directed once Lot # /C3  Exp:  10/2020, Disp: , Rfl:      OnabotulinumtoxinA (BOTOX IJ), Inject 165 Units into the muscle once Lot /C3 Exp 06/2020, Disp: , Rfl:      ondansetron (ZOFRAN) 8 MG tablet, Take 1 tablet (8 mg) by mouth every 8 hours as needed for nausea, Disp: 60 tablet, Rfl: 1     order for DME, Equipment being ordered: size 8 1/2 walking boot tall, Disp: 1 Device, Rfl: 0     order for DME, Equipment being ordered: [employer] knee scooter, Disp: 1 Device, Rfl: 0     order for DME, Equipment being ordered: adult pair of crutches, Disp: 1 Device, Rfl: 0     oxyCODONE-acetaminophen (PERCOCET) 5-325 MG tablet, Take 1-2 tablets every 4-6 hours as needed for pain.  Do not take other tylenol products with this medication, as too much tylenol can be damaging to the liver., Disp: 15 tablet, Rfl: 0     oxyCODONE-acetaminophen (PERCOCET) 5-325 MG tablet, Take 1-2 tablets every 4-6 hours as needed for pain.  Do not take other tylenol products with this medication, as too much tylenol can be damaging to the liver., Disp: 25 tablet, Rfl: 0     polyethylene glycol (MIRALAX/GLYCOLAX) powder, Take 1 capful by mouth 3 times daily, Disp: , Rfl:      sucralfate (CARAFATE) 1 GM/10ML  suspension, Take 10 mLs (1 g) by mouth 4 times daily, Disp: 1200 mL, Rfl: 2     triamcinolone (KENALOG) 0.1 % cream, Apply sparingly to lesions twice daily as needed, Disp: 80 g, Rfl: 1     Magnesium Aspartate HCl 1230 MG PACK, Take 1 packet by mouth daily as needed (Patient not taking: Reported on 2/5/2019), Disp: 30 each, Rfl: 3     sennosides (SENOKOT) 8.6 MG tablet, Take 1 tablet by mouth daily, Disp: , Rfl:      Allergies   Allergen Reactions     Amoxil [Penicillins] Rash     Dad unsure of reaction.     Bee Venom Anaphylaxis     Contrast Dye Rash     Contrast Media Ready-Box [facility] [place], 04/09/2014.; Contrast Media Ready-Box [facility] [place], 04/09/2014.  NOTE: this is a contrast media oral with iodine. Premedicate with methylpred standard for IV contrast, request barium contrast for oral contrast.     Kiwi Swelling     Orange Fruit [Citrus] Anaphylaxis     Pineapple Anaphylaxis, Difficulty breathing and Rash     Reglan [Metoclopramide] Other (See Comments)     IV dose only, in ER, rapid heart rate.     Ace Inhibitors      Difficulty in breathing and GI upset     Amitiza [Lubiprostone] Nausea and Vomiting     Amoxicillin-Pot Clavulanate      Latex      Midazolam Unknown     Other reaction(s): Unknown  parent states that when pt takes this medication, she wakes up being very violent .  parent states that when pt takes this medication, she wakes up being very violent .     No Clinical Screening - See Comments      Bleech/ chest tightness, itchy throat, swollen tongue, hives     Versed      Coming out of pelvic exam at age of 5, was kicking and screaming when coming out of the versed.     Adhesive Tape Rash     Azithromycin Hives and Rash     Cephalexin Itching and Rash     Itchy mouth  Itchy mouth     Keflex [Cephalexin-Fd&C Yellow #6] Hives        PHYSICAL EXAM:    Currently has a 3/10 headache - she is currently experiencing a multi-day headache  No facial asymmetry    HPI:    Patient reports the following new medical problems  since last visit: Samara underwent a benign cyst removal and tendon transfer on her left foot/ankle 5 weeks ago. She is currently on crutches. Also had one ER visit for suspected blood clot due to elevated d-dimer, which turned out to be negative.      We reviewed the recommended safety guidelines for  Botox from any vaccine injection, such as the seasonal flu vaccine, by a minimum of 10-14 days with Samara Oropeza. She acknowledged understanding.    RESPONSE TO PREVIOUS TREATMENT:  Change in headache pattern following last series of injections with 175 units on 11/13/18.     Post-procedural headache: Rated as 'Moderate' severity.  Duration: 2-3 days followed by gradual resolution    1.  Headache frequency during this injection cycle: No headaches until the last 4 weeks of the injection cycle during which time she has experienced 4 headaches. This is compared to her baseline headache frequency of 20 headache days per month.     2.  Headache duration during this injection cycle:  Headache duration ranged from a few hours hours to 3 days. Patient reports 1 episodes of multiple day headaches during this injection cycle, which she is currently experiencing.     3.  Headache intensity during this injection cycle:    A.  5/10  =  Typical pain level.  B.  7/10  =  Worst pain level.  C.  0/10  =  Lowest pain level.    4.  Change in headache medication usage during this injection cycle:  Has not taken any other medication for her headaches.    5.  ER Visits During This Injection Cycle:  None.    6.  Functional Performance:  Change in ADL's, social interaction, days lost from work, etc. Patient reports being able to more fully participate in social and family activities and responsibilities as headache symptoms have improved after the procedure which is typical for her.      BOTULINUM NEUROTOXIN INJECTION PROCEDURES:      VERIFICATION OF PATIENT IDENTIFICATION AND PROCEDURE     Initials   Patient Name ses    Patient  ses   Procedure Verified by: ses     Prior to the start of the procedure and with procedural staff participation, I verbally confirmed the patient s identity using two indicators, relevant allergies, that the procedure was appropriate and matched the consent or emergent situation, and that the correct equipment/implants were available. Immediately prior to starting the procedure I conducted the Time Out with the procedural staff and re-confirmed the patient s name, procedure, and site/side. (The Joint Commission universal protocol was followed.)  Yes    Sedation (Moderate or Deep): None    Above assessments performed by:    Alejandrina Hernandez MD      INDICATIONS FOR PROCEDURES:  Samara Oropeza is a 23-year-old patient with a history of Tourette's syndrome, spasmodic torticollis, chronic neck pain, and chronic migraine headaches associated with cervicogenic components.     Her baseline symptoms have been recalcitrant to oral medications and conservative therapy.  She is here today for reinjection with Botox.    GOAL OF PROCEDURE:  The goal of this procedure is to increase active range of motion, improve volitional motor control, decrease pain  and enhance functional independence.    TOTAL DOSE ADMINISTERED:  Dose Administered:  175 units  Botox (Botulinum Toxin Type A)       2:1 Dilution   Diluent Used:  0.5% Sensorcaine (Batch: [id number], Exp: 2021, NDC 55150-169-10)   Lot: /C3  with Expiration Date: 2021  NDC #: Botox 100u ([address]-34)    Was there drug waste? Yes  Amount of drug waste (mL): 25 units Botox.  Reason for waste:  Single use vial  Multi-dose vial: No    Alejandrina Hernandez MD  2019     Medication guide was offered to patient and was declined.    CONSENT:  The risks, benefits, and treatment options were discussed with Samara Oropeza and she agreed to proceed.    Written consent was obtained by [facility].     EQUIPMENT USED:  Needle-37mm  stimulating/recording  Needle-30 gauge  EMG/NCS Machine    SKIN PREPARATION:  Skin preparation was performed using an alcohol wipe.    GUIDANCE DESCRIPTION:  Electro-myographic guidance was necessary throughout the procedure to accurately identify all areas of spastic muscles while avoiding injection of non-spastic muscles, neighboring nerves and nearby vascular structures.     AREA/MUSCLE INJECTED:  175 UNITS BOTOX = TOTAL DOSE     1 & 2. SHOULDER GIRDLE & NECK MUSCLES: 20 units Botox = Total Dose, 2:1 Dilution   Right Splenius - 5 units of Botox at 1 site/s.   Left Splenius - 5 units of Botox at 1 site/s.      Right Levator Scapulae - 5 units of Botox at 1 site/s (shoulder muscles).   Left Levator Scapulae - 5 units of Botox at 1 site/s (shoulder muscles).     3. HEAD & SCALP MUSCLES: 155 units Botox = Total Dose, 2:1 Dilution  Right Occipitalis - 10 units of Botox at 3 site/s.   Left Occipitalis - 10 units of Botox at 3 site/s.     Right Frontalis - 15 units of Botox at 4 site/s.  Left Frontalis - 15 units of Botox at 4 site/s.     Right Temporalis - 45 units of Botox at 8 site/s.  Left Temporalis - 45 units of Botox at 8 site/s.     Right  - 5 units of Botox at 1 site/s.              Left  - 5 units of Botox at 1 site/s.      Procerus - 5 units of Botox at 1 site/s.      RESPONSE TO PROCEDURE:  Samara Oropeza tolerated the procedure well and there were no immediate complications.  She was allowed to recover for an appropriate period of time and was discharged home in stable condition.    FOLLOW UP:  Samara Oropeza was asked to follow up by phone in 7-14 days with Marcelle Richardson PT, Care Coordinator or Vidya Dickinson RN, Care Coordinator, to report her response to this series of injections.  Based on the patient's previous response to this therapy, Samara Oropeza was rescheduled for the next series of injections in 12 weeks.    PLAN (Medication Changes, Therapy Orders,  Work or Disability Issues, etc.): Patient will continue to monitor response to today's injections.

## 2019-02-05 NOTE — NURSING NOTE
Chief Complaint   Patient presents with     RECHECK     [facility] RETURN BOTOX        Pricila Blakely, EMT

## 2019-02-05 NOTE — LETTER
2/5/2019       RE: Samara Oropeza  [address]  [place] [address]     Dear Colleague,    Thank you for referring your patient, Samara Oropeza, to the [facility] PHYSICAL MEDICINE AND REHABILITATION at [facility]. Please see a copy of my visit note below.    BOTULINUM TOXIN PROCEDURE - HEADACHE - NOTE    Chief Complaint   Patient presents with     RECHECK     UMP RETURN BOTOX      /88 (BP Location: Left arm, Patient Position: Chair, Cuff Size: Adult Regular)   Pulse 128   Temp 97.7  F (36.5  C) (Oral)   SpO2 99%      Current Outpatient Medications:      albuterol (PROAIR HFA/PROVENTIL HFA/VENTOLIN HFA) 108 (90 BASE) MCG/ACT Inhaler, Inhale 2 puffs into the lungs every 6 hours as needed for shortness of breath / dyspnea or wheezing, Disp: 1 Inhaler, Rfl: 1     amitriptyline (ELAVIL) 50 MG tablet, Take 1 tablet (50 mg) by mouth At Bedtime, Disp: 30 tablet, Rfl: 11     artificial tears OINT ophthalmic ointment, 0.5 inch strip each eye at night, Disp: 1 Tube, Rfl: 11     aspirin (ASPIRIN CHILDRENS) 81 MG chewable tablet, Take 81 mg by mouth as needed , Disp: , Rfl:      benzocaine (TOPICALE XTRA) 20 % GEL, Apply as needed locally to mouth or nasal ulcers for pain; 4 times daily as needed, Disp: 30 g, Rfl: 1     betamethasone valerate (VALISONE) 0.1 % cream, Apply topically 2 times daily, Disp: , Rfl:      botulinum toxin type A (BOTOX) 100 UNITS injection, Inject 200 Units into the muscle every 3 months, Disp: 200 Units, Rfl: 3     clindamycin (CLEOCIN) 300 MG capsule, Take 1 capsule (300 mg) by mouth 3 times daily for 10 days, Disp: 30 capsule, Rfl: 0     clobetasol (TEMOVATE) 0.05 % cream, Apply topically 2 times daily, Disp: 60 g, Rfl: 0     colchicine (COLCYRS) 0.6 MG tablet, Take 0.6 mg in AM and 0.6mg in PM every day, Disp: 180 tablet, Rfl: 0     cyproheptadine (PERIACTIN) 4 MG tablet, Take 1 tablet (4 mg) by mouth At Bedtime For nightmares,  Disp: 30 tablet, Rfl: 2     diclofenac (VOLTAREN) 1 % GEL topical gel, Apply 2-4 grams to affected area(s) up to 4 times per day as needed. This is an anti-inflammatory medication., Disp: 100 g, Rfl: 4     dicyclomine (BENTYL) 20 MG tablet, Take 1 tablet (20 mg) by mouth 4 times daily as needed, Disp: 120 tablet, Rfl: 3     diltiazem 2% in PLO cream, FV COMPOUNDED, 2% GEL, Apply small pea size amount three times daily to anus until pain is gone., Disp: 30 g, Rfl: 1     EPINEPHrine (EPIPEN 2-EAMON) 0.3 MG/0.3ML injection, Inject 0.3 mLs (0.3 mg) into the muscle as needed for anaphylaxis, Disp: 0.6 mL, Rfl: 3     guanFACINE (TENEX) 1 MG tablet, Take 3 tablets (3 mg) by mouth twice daily in the morning and evening., Disp: 180 tablet, Rfl: 3     hydrocortisone 1 % CREA cream, Place rectally 2 times daily as needed for itching, Disp: 28.35 g, Rfl: 1     hydrOXYzine (VISTARIL) 25 MG capsule, Take 1-2 tablets every 4-6 hours as needed for pain control., Disp: 15 capsule, Rfl: 0     hydrOXYzine (VISTARIL) 25 MG capsule, Take 1-2 tablets every 4-6 hours as needed for pain control., Disp: 25 capsule, Rfl: 0     hyoscyamine (ANASPAZ/LEVSIN) 0.125 MG tablet, Take 1-2 tablets (125-250 mcg) by mouth every 4 hours as needed for cramping, Disp: 40 tablet, Rfl: 1     hypromellose (GENTEAL) 0.3 % SOLN, 1 drop every hour as needed for dry eyes , Disp: , Rfl:      ibuprofen (ADVIL/MOTRIN) 600 MG tablet, Take 600mg of ibuprofen every 6 hours x 5 days to assist in pain/inflammation control.  If you are not tolerating the medication, you are ok to stop., Disp: 20 tablet, Rfl: 0     Lactase (LACTAID PO), Take by mouth daily, Disp: , Rfl:      lactulose 20 GM/30ML SOLN, Take 30 mLs by mouth 3 times daily as needed (for constipation), Disp: 300 mL, Rfl: 3     lidocaine (XYLOCAINE) 2 % jelly, Apply 1 Tube topically daily Reported on 4/21/2017, Disp: , Rfl:      linaclotide (LINZESS) 145 MCG capsule, Take 1 capsule (145 mcg) by mouth every  morning (before breakfast), Disp: 90 capsule, Rfl: 1     Magic Mouthwash (FV std formula) lidocaine visc 2% 2.5mL/5mL & maalox/mylanta w/ simeth 2.5mL/5mL & diphenhydrAMINE 5mg/5mL, Swish and swallow 10 mLs in mouth every 6 hours as needed for mouth sores, Disp: 1 Bottle, Rfl: 1     nitroFURantoin macrocrystal-monohydrate (MACROBID) 100 MG capsule, Take 1 capsule (100 mg) by mouth At Bedtime, Disp: 90 capsule, Rfl: 1     norgestimate-ethinyl estradiol (ORTHO-CYCLEN/SPRINTEC) 0.25-35 MG-MCG tablet, Take 1 tablet by mouth daily, Disp: 84 tablet, Rfl: 4     omeprazole (PRILOSEC) 40 MG capsule, Take 1 capsule (40 mg) by mouth daily, Disp: 90 capsule, Rfl: 3     OnabotulinumtoxinA (BOTOX IJ), Inject 175 Units into the muscle Lot: [id number] Exp: 01/2021, Disp: , Rfl:      OnabotulinumtoxinA (BOTOX IJ), Inject 175 Units into the muscle once Lot # /C3 with Expiration Date:  11/2020, Disp: , Rfl:      OnabotulinumtoxinA (BOTOX IJ), Inject 165 Units as directed once Lot#: /C3 Exp: 10/2020, Disp: , Rfl:      OnabotulinumtoxinA (BOTOX IJ), Inject 165 Units as directed once Lot # /C3  Exp:  10/2020, Disp: , Rfl:      OnabotulinumtoxinA (BOTOX IJ), Inject 165 Units into the muscle once Lot /C3 Exp 06/2020, Disp: , Rfl:      ondansetron (ZOFRAN) 8 MG tablet, Take 1 tablet (8 mg) by mouth every 8 hours as needed for nausea, Disp: 60 tablet, Rfl: 1     order for DME, Equipment being ordered: size 8 1/2 walking boot tall, Disp: 1 Device, Rfl: 0     order for DME, Equipment being ordered: RollAbout knee scooter, Disp: 1 Device, Rfl: 0     order for DME, Equipment being ordered: adult pair of crutches, Disp: 1 Device, Rfl: 0     oxyCODONE-acetaminophen (PERCOCET) 5-325 MG tablet, Take 1-2 tablets every 4-6 hours as needed for pain.  Do not take other tylenol products with this medication, as too much tylenol can be damaging to the liver., Disp: 15 tablet, Rfl: 0     oxyCODONE-acetaminophen (PERCOCET) 5-325 MG tablet,  Take 1-2 tablets every 4-6 hours as needed for pain.  Do not take other tylenol products with this medication, as too much tylenol can be damaging to the liver., Disp: 25 tablet, Rfl: 0     polyethylene glycol (MIRALAX/GLYCOLAX) powder, Take 1 capful by mouth 3 times daily, Disp: , Rfl:      sucralfate (CARAFATE) 1 GM/10ML suspension, Take 10 mLs (1 g) by mouth 4 times daily, Disp: 1200 mL, Rfl: 2     triamcinolone (KENALOG) 0.1 % cream, Apply sparingly to lesions twice daily as needed, Disp: 80 g, Rfl: 1     Magnesium Aspartate HCl 1230 MG PACK, Take 1 packet by mouth daily as needed (Patient not taking: Reported on 2/5/2019), Disp: 30 each, Rfl: 3     sennosides (SENOKOT) 8.6 MG tablet, Take 1 tablet by mouth daily, Disp: , Rfl:      Allergies   Allergen Reactions     Amoxil [Penicillins] Rash     Dad unsure of reaction.     Bee Venom Anaphylaxis     Contrast Dye Rash     Contrast Media Ready-Box [facility], 04/09/2014.; Contrast Media Ready-Box [facility], 04/09/2014.  NOTE: this is a contrast media oral with iodine. Premedicate with methylpred standard for IV contrast, request barium contrast for oral contrast.     Kiwi Swelling     Orange Fruit [Citrus] Anaphylaxis     Pineapple Anaphylaxis, Difficulty breathing and Rash     Reglan [Metoclopramide] Other (See Comments)     IV dose only, in ER, rapid heart rate.     Ace Inhibitors      Difficulty in breathing and GI upset     Amitiza [Lubiprostone] Nausea and Vomiting     Amoxicillin-Pot Clavulanate      Latex      Midazolam Unknown     Other reaction(s): Unknown  parent states that when pt takes this medication, she wakes up being very violent .  parent states that when pt takes this medication, she wakes up being very violent .     No Clinical Screening - See Comments      Bleech/ chest tightness, itchy throat, swollen tongue, hives     Versed      Coming out of pelvic exam at age of 5, was kicking and screaming when coming out of the versed.     Adhesive Tape Rash      Azithromycin Hives and Rash     Cephalexin Itching and Rash     Itchy mouth  Itchy mouth     Keflex [Cephalexin-Fd&C Yellow #6] Hives        PHYSICAL EXAM:    Currently has a 3/10 headache - she is currently experiencing a multi-day headache  No facial asymmetry    HPI:    Patient reports the following new medical problems since last visit: Samara underwent a benign cyst removal and tendon transfer on her left foot/ankle 5 weeks ago. She is currently on crutches. Also had one ER visit for suspected blood clot due to elevated d-dimer, which turned out to be negative.      We reviewed the recommended safety guidelines for  Botox from any vaccine injection, such as the seasonal flu vaccine, by a minimum of 10-14 days with Samara Oropeza. She acknowledged understanding.    RESPONSE TO PREVIOUS TREATMENT:  Change in headache pattern following last series of injections with 175 units on 11/13/18.     Post-procedural headache: Rated as 'Moderate' severity.  Duration: 2-3 days followed by gradual resolution    1.  Headache frequency during this injection cycle: No headaches until the last 4 weeks of the injection cycle during which time she has experienced 4 headaches. This is compared to her baseline headache frequency of 20 headache days per month.     2.  Headache duration during this injection cycle:  Headache duration ranged from a few hours hours to 3 days. Patient reports 1 episodes of multiple day headaches during this injection cycle, which she is currently experiencing.     3.  Headache intensity during this injection cycle:    A.  5/10  =  Typical pain level.  B.  7/10  =  Worst pain level.  C.  0/10  =  Lowest pain level.    4.  Change in headache medication usage during this injection cycle:  Has not taken any other medication for her headaches.    5.  ER Visits During This Injection Cycle:  None.    6.  Functional Performance:  Change in ADL's, social interaction, days lost from work, etc.  Patient reports being able to more fully participate in social and family activities and responsibilities as headache symptoms have improved after the procedure which is typical for her.      BOTULINUM NEUROTOXIN INJECTION PROCEDURES:      VERIFICATION OF PATIENT IDENTIFICATION AND PROCEDURE     Initials   Patient Name ses   Patient  ses   Procedure Verified by: kirill     Prior to the start of the procedure and with procedural staff participation, I verbally confirmed the patient s identity using two indicators, relevant allergies, that the procedure was appropriate and matched the consent or emergent situation, and that the correct equipment/implants were available. Immediately prior to starting the procedure I conducted the Time Out with the procedural staff and re-confirmed the patient s name, procedure, and site/side. (The Joint Commission universal protocol was followed.)  Yes    Sedation (Moderate or Deep): None    Above assessments performed by:    Alejandrina Hernandez MD      INDICATIONS FOR PROCEDURES:  Samara Oropeza is a 23-year-old patient with a history of Tourette's syndrome, spasmodic torticollis, chronic neck pain, and chronic migraine headaches associated with cervicogenic components.     Her baseline symptoms have been recalcitrant to oral medications and conservative therapy.  She is here today for reinjection with Botox.    GOAL OF PROCEDURE:  The goal of this procedure is to increase active range of motion, improve volitional motor control, decrease pain  and enhance functional independence.    TOTAL DOSE ADMINISTERED:  Dose Administered:  175 units  Botox (Botulinum Toxin Type A)       2:1 Dilution   Diluent Used:  0.5% Sensorcaine (Batch: [id number], Exp: 2021, NDC 55150-169-10)   Lot: /C3  with Expiration Date: 2021  NDC #: Botox 100u ([address]-98)    Was there drug waste? Yes  Amount of drug waste (mL): 25 units Botox.  Reason for waste:  Single use vial  Multi-dose vial:  Koki Hernandez MD  February 5, 2019     Medication guide was offered to patient and was declined.    CONSENT:  The risks, benefits, and treatment options were discussed with Samara Oropeza and she agreed to proceed.    Written consent was obtained by [facility].     EQUIPMENT USED:  Needle-37mm stimulating/recording  Needle-30 gauge  EMG/NCS Machine    SKIN PREPARATION:  Skin preparation was performed using an alcohol wipe.    GUIDANCE DESCRIPTION:  Electro-myographic guidance was necessary throughout the procedure to accurately identify all areas of spastic muscles while avoiding injection of non-spastic muscles, neighboring nerves and nearby vascular structures.     AREA/MUSCLE INJECTED:  175 UNITS BOTOX = TOTAL DOSE     1 & 2. SHOULDER GIRDLE & NECK MUSCLES: 20 units Botox = Total Dose, 2:1 Dilution   Right Splenius - 5 units of Botox at 1 site/s.   Left Splenius - 5 units of Botox at 1 site/s.      Right Levator Scapulae - 5 units of Botox at 1 site/s (shoulder muscles).   Left Levator Scapulae - 5 units of Botox at 1 site/s (shoulder muscles).     3. HEAD & SCALP MUSCLES: 155 units Botox = Total Dose, 2:1 Dilution  Right Occipitalis - 10 units of Botox at 3 site/s.   Left Occipitalis - 10 units of Botox at 3 site/s.     Right Frontalis - 15 units of Botox at 4 site/s.  Left Frontalis - 15 units of Botox at 4 site/s.     Right Temporalis - 45 units of Botox at 8 site/s.  Left Temporalis - 45 units of Botox at 8 site/s.     Right  - 5 units of Botox at 1 site/s.              Left  - 5 units of Botox at 1 site/s.      Procerus - 5 units of Botox at 1 site/s.      RESPONSE TO PROCEDURE:  Samara Oropeza tolerated the procedure well and there were no immediate complications.  She was allowed to recover for an appropriate period of time and was discharged home in stable condition.    FOLLOW UP:  Samara Oropeza was asked to follow up by phone in 7-14 days with Marcelle Richardson  PT, Care Coordinator or Vidya Dickinson RN, Care Coordinator, to report her response to this series of injections.  Based on the patient's previous response to this therapy, Samara Oropeza was rescheduled for the next series of injections in 12 weeks.    PLAN (Medication Changes, Therapy Orders, Work or Disability Issues, etc.): Patient will continue to monitor response to today's injections.    Again, thank you for allowing me to participate in the care of your patient.      Sincerely,    Alejandrina Hernandez MD

## 2019-02-06 ENCOUNTER — OFFICE VISIT (OUTPATIENT)
Dept: PSYCHOLOGY | Facility: CLINIC | Age: 24
End: 2019-02-06
Payer: COMMERCIAL

## 2019-02-06 ENCOUNTER — MYC MEDICAL ADVICE (OUTPATIENT)
Dept: PODIATRY | Facility: CLINIC | Age: 24
End: 2019-02-06

## 2019-02-06 DIAGNOSIS — F33.1 MAJOR DEPRESSIVE DISORDER, RECURRENT EPISODE, MODERATE (H): Primary | ICD-10-CM

## 2019-02-06 DIAGNOSIS — F41.1 GAD (GENERALIZED ANXIETY DISORDER): ICD-10-CM

## 2019-02-06 DIAGNOSIS — Z98.890 S/P ANKLE LIGAMENT REPAIR: Primary | ICD-10-CM

## 2019-02-06 PROCEDURE — 90834 PSYTX W PT 45 MINUTES: CPT | Performed by: COUNSELOR

## 2019-02-06 ASSESSMENT — PATIENT HEALTH QUESTIONNAIRE - PHQ9
5. POOR APPETITE OR OVEREATING: MORE THAN HALF THE DAYS
SUM OF ALL RESPONSES TO PHQ QUESTIONS 1-9: 8

## 2019-02-06 ASSESSMENT — ANXIETY QUESTIONNAIRES
1. FEELING NERVOUS, ANXIOUS, OR ON EDGE: MORE THAN HALF THE DAYS
3. WORRYING TOO MUCH ABOUT DIFFERENT THINGS: SEVERAL DAYS
5. BEING SO RESTLESS THAT IT IS HARD TO SIT STILL: MORE THAN HALF THE DAYS
7. FEELING AFRAID AS IF SOMETHING AWFUL MIGHT HAPPEN: SEVERAL DAYS
GAD7 TOTAL SCORE: 10
IF YOU CHECKED OFF ANY PROBLEMS ON THIS QUESTIONNAIRE, HOW DIFFICULT HAVE THESE PROBLEMS MADE IT FOR YOU TO DO YOUR WORK, TAKE CARE OF THINGS AT HOME, OR GET ALONG WITH OTHER PEOPLE: SOMEWHAT DIFFICULT
2. NOT BEING ABLE TO STOP OR CONTROL WORRYING: SEVERAL DAYS
6. BECOMING EASILY ANNOYED OR IRRITABLE: SEVERAL DAYS

## 2019-02-06 NOTE — Clinical Note
Samara Meza is inquiring about anxiety medication. She reports she was on Ativan in the past for GI issues, but it also helped with her anxiety. Please advise.Thank you, Ernestina Patel MA, [facility]
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
## 2019-02-07 RX ORDER — BUPIVACAINE HYDROCHLORIDE 5 MG/ML
3.5 INJECTION, SOLUTION EPIDURAL; INTRACAUDAL ONCE
Status: COMPLETED | OUTPATIENT
Start: 2019-02-05 | End: 2019-02-07

## 2019-02-07 RX ADMIN — BUPIVACAINE HYDROCHLORIDE 17.5 MG: 5 INJECTION, SOLUTION EPIDURAL; INTRACAUDAL at 14:17

## 2019-02-07 NOTE — TELEPHONE ENCOUNTER
Please see [employer] message.     Phone call to patient. Follow up appointment rescheduled to 2/14/19 per patient request.    She states she got the knee scooter at [facility] in [place]. She had received the knee scooter at her follow up visit after surgery. She spoke with them and they could not find the order for the knee scooter, so gave her a one month allowance only.   Patient states Dr. Johnson told her it should be 3 months.   Will have Dr. Johnson address order and we can send her a [employer] message once the order has been faxed to [facility]: [phone].     Please see order pending. Please indicate length of need and sign if appropriate.     BRIDGER Juares RN

## 2019-02-08 ASSESSMENT — ANXIETY QUESTIONNAIRES: GAD7 TOTAL SCORE: 10

## 2019-02-14 ENCOUNTER — OFFICE VISIT (OUTPATIENT)
Dept: PODIATRY | Facility: CLINIC | Age: 24
End: 2019-02-14
Payer: COMMERCIAL

## 2019-02-14 VITALS
SYSTOLIC BLOOD PRESSURE: 128 MMHG | WEIGHT: 152 LBS | BODY MASS INDEX: 27.97 KG/M2 | HEART RATE: 106 BPM | HEIGHT: 62 IN | DIASTOLIC BLOOD PRESSURE: 81 MMHG

## 2019-02-14 DIAGNOSIS — Z98.890 S/P ANKLE LIGAMENT REPAIR: Primary | ICD-10-CM

## 2019-02-14 PROCEDURE — 99024 POSTOP FOLLOW-UP VISIT: CPT | Performed by: PODIATRIST

## 2019-02-14 ASSESSMENT — MIFFLIN-ST. JEOR: SCORE: 1392.72

## 2019-02-14 NOTE — PATIENT INSTRUCTIONS
Thank you for choosing [facility]!    DR. CRISOSTOMO'S CLINIC LOCATIONS:   MONDAY - [place] TUESDAY - [place]   [address] [facility]  [address]   [place] [address] [place] [address]   [phone]       THURSDAY AM - [place] THURSDAY PM - [place]   [address]   [place] [address] [place], [place] [address] [phone]       FRIDAY AM - [place] SET UP SURGERY: [phone] [address] APPOINTMENTS: [phone]   [place] [address] BILLING QUESTIONS: [phone] FAX NUMBER: [phone]     Follow Up: 4 weeks    FYI: Body Mass Index (BMI)  Many things can cause foot and ankle problems. Foot structure, activity level, foot mechanics and injuries are common causes of pain. One very important issue that often goes unmentioned, is body weight. Extra weight can cause increased stress on muscles, ligaments, bones and tendons. Sometimes just a few extra pounds is all it takes to put one over her/his threshold. Without reducing that stress, it can be difficult to alleviate pain. Some people are uncomfortable addressing this issue, but we feel it is important for you to think about it. As Foot &  Ankle specialists, our job is addressing the lower extremity problem and possible causes. Regarding extra body weight, we encourage patients to discuss diet and weight management plans with their primary care doctors. It is this team approach that gives you the best opportunity for pain relief and getting you back on your feet.

## 2019-02-14 NOTE — LETTER
"    2/14/2019         RE: Samara Oropeza  [address]  [place] [address]        Dear Colleague,    Thank you for referring your patient, Samara Oropeza, to the [facility]. Please see a copy of my visit note below.    Foot & Ankle Surgery  February 14, 2019    S:  Patient in today approx 6 weeks sp ankle scope with open ATFL repair and excision of sinus tarsi mass.  Pain levels improving.  She can have some discomfort from the anterior ankle to the dorsal foot.  PT going well at [facility], she's essentially full WB in the boot but still using the crutches    /81   Pulse 106   Ht 1.575 m (5' 2\")   Wt 68.9 kg (152 lb)   BMI 27.80 kg/m         ROS - positive for CC.  Patient denies current nausea, vomiting, chills, fevers, belly pain, calf pain, chest pain or SOB.  Complete remainder of ROS is otherwise neg.    PE - small scab along lateral incsion, but this is otherwise healed.  Some skin sensitivities along anterior ankle(at medial edge of lateral incision) across the dorsal foot.  Anterior drawer shows minimal discomfort and no migration.  Skin shows no trophic, color or temperature changes otherwise.  No calf redness, swelling or pain noted otherwise.    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  Advised 10m TID tissue massage to desensitize.  Consider topical vs PO meds if symptoms persist.    Follow up  -  4 weeks or sooner with acute issues    Body mass index is 27.8 kg/m .  Weight management plan: Patient was referred to their PCP to discuss a diet and exercise plan.      nAdres Johnson, ROSIBEL FACFAS FACFAOM  Podiatric Foot & Ankle " Surgeon  [facility]  [phone]        Again, thank you for allowing me to participate in the care of your patient.        Sincerely,        Andres Johnson DPM, CHRISTINAM

## 2019-02-14 NOTE — PROGRESS NOTES
"Foot & Ankle Surgery  February 14, 2019    S:  Patient in today approx 6 weeks sp ankle scope with open ATFL repair and excision of sinus tarsi mass.  Pain levels improving.  She can have some discomfort from the anterior ankle to the dorsal foot.  PT going well at [facility], she's essentially full WB in the boot but still using the crutches    /81   Pulse 106   Ht 1.575 m (5' 2\")   Wt 68.9 kg (152 lb)   BMI 27.80 kg/m        ROS - positive for CC.  Patient denies current nausea, vomiting, chills, fevers, belly pain, calf pain, chest pain or SOB.  Complete remainder of ROS is otherwise neg.    PE - small scab along lateral incsion, but this is otherwise healed.  Some skin sensitivities along anterior ankle(at medial edge of lateral incision) across the dorsal foot.  Anterior drawer shows minimal discomfort and no migration.  Skin shows no trophic, color or temperature changes otherwise.  No calf redness, swelling or pain noted otherwise.    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  Advised 10m TID tissue massage to desensitize.  Consider topical vs PO meds if symptoms persist.    Follow up  -  4 weeks or sooner with acute issues    Body mass index is 27.8 kg/m .  Weight management plan: Patient was referred to their PCP to discuss a diet and exercise plan.      Andres Johnson DPM FACFAS FACFAOM  Podiatric Foot & Ankle Surgeon  [facility]  [phone]      "

## 2019-02-27 DIAGNOSIS — F51.5 NIGHTMARES: ICD-10-CM

## 2019-02-27 RX ORDER — CYPROHEPTADINE HYDROCHLORIDE 4 MG/1
4 TABLET ORAL AT BEDTIME
Qty: 30 TABLET | Refills: 2 | Status: SHIPPED | OUTPATIENT
Start: 2019-02-27 | End: 2019-06-11

## 2019-02-27 NOTE — TELEPHONE ENCOUNTER
"Writer called [facility] - Charlene JNEKINS -Patient transferred prescription to [facility] in [facility] - no refills on file - patient should be out of refills as well her account has not been \"deleted\" history and information can still be verified      "

## 2019-02-27 NOTE — TELEPHONE ENCOUNTER
Requested Prescriptions   Pending Prescriptions Disp Refills     cyproheptadine (PERIACTIN) 4 MG tablet 30 tablet 2     Sig: Take 1 tablet (4 mg) by mouth At Bedtime For nightmares    There is no refill protocol information for this order            Last Written Prescription Date:  12/5/18  Last Fill Quantity: 30,   # refills: 2  Last Office Visit: 12/12/18  Future Office visit:    Next 5 appointments (look out 90 days)    Mar 12, 2019 10:00 AM CDT  Return Visit with Ernestina Patel [facility] ([address] [address]  [phone]   Mar 26, 2019 10:00 AM CDT  Return Visit with Ernestina Patel [address] [address]  [phone]           Routing refill request to provider for review/approval because:  Drug not on the G refill protocol

## 2019-02-27 NOTE — TELEPHONE ENCOUNTER
Reason for call:  Medication   If this is a refill request, has the caller requested the refill from the pharmacy already? Yes  Will the patient be using a [facility]? No  Name of the pharmacy and phone number for the current request: [facility], [phone]    Name of the medication requested: cyproheptadine (PERIACTIN) 4 MG tablet    Other request: Patient and pharmacist tried to get prescription transferred from [facility] pharmacy to the [facility] pharmacy and they said that the patients account had been deleted and they have no record of the medication. Please call Ling at the [facility] pharmacy and let her know if this can be filled.  Prescription should be faxed to [phone]      Phone number to reach patient:  Home number on file [phone] (home)    Best Time:      Can we leave a detailed message on this number?

## 2019-03-07 ENCOUNTER — OFFICE VISIT (OUTPATIENT)
Dept: PODIATRY | Facility: CLINIC | Age: 24
End: 2019-03-07
Payer: COMMERCIAL

## 2019-03-07 VITALS
SYSTOLIC BLOOD PRESSURE: 126 MMHG | DIASTOLIC BLOOD PRESSURE: 78 MMHG | WEIGHT: 152 LBS | HEIGHT: 62 IN | BODY MASS INDEX: 27.97 KG/M2

## 2019-03-07 DIAGNOSIS — L03.116 CELLULITIS OF LEFT ANKLE: Primary | ICD-10-CM

## 2019-03-07 PROCEDURE — 99024 POSTOP FOLLOW-UP VISIT: CPT | Performed by: PODIATRIST

## 2019-03-07 RX ORDER — CLINDAMYCIN HCL 300 MG
300 CAPSULE ORAL 3 TIMES DAILY
Qty: 30 CAPSULE | Refills: 0 | Status: ON HOLD | OUTPATIENT
Start: 2019-03-07 | End: 2019-03-15

## 2019-03-07 ASSESSMENT — MIFFLIN-ST. JEOR: SCORE: 1392.72

## 2019-03-07 NOTE — PATIENT INSTRUCTIONS
Thank you for choosing [facility]!    DR. CRISOSTOMO'S CLINIC LOCATIONS:   MONDAY - [place] TUESDAY - [place]   [address] [facility]  [address]   [place] [address] [place] [address]   [phone]       THURSDAY AM - [place] THURSDAY PM - [place]   [address]   [place] [address] [place] [address]   [phone]       FRIDAY AM - [place] SET UP SURGERY: [phone] [address] APPOINTMENTS: [phone]   [place] [address] BILLING QUESTIONS: [phone] FAX NUMBER: [phone]     Follow Up: 1 week    FYI: Body Mass Index (BMI)  Many things can cause foot and ankle problems. Foot structure, activity level, foot mechanics and injuries are common causes of pain. One very important issue that often goes unmentioned, is body weight. Extra weight can cause increased stress on muscles, ligaments, bones and tendons. Sometimes just a few extra pounds is all it takes to put one over her/his threshold. Without reducing that stress, it can be difficult to alleviate pain. Some people are uncomfortable addressing this issue, but we feel it is important for you to think about it. As Foot &  Ankle specialists, our job is addressing the lower extremity problem and possible causes. Regarding extra body weight, we encourage patients to discuss diet and weight management plans with their primary care doctors. It is this team approach that gives you the best opportunity for pain relief and getting you back on your feet.    Patient to follow up with Primary Care provider regarding elevated blood pressure.      SMOKING CESSATION  What's in cigarette smoke? - Cigarette smoke contains over 4,000 chemicals. Nicotine is one of the main ingredients which is an insecticide/herbicide. It is poisonous to our nervous system, increases blood clotting risk, and decreases the body's defenses to fight off  infection. Another chemical is Carbon Monoxide is an asphyxiating gas that permanently binds to blood cells and blocks the transport of oxygen. This leads to tissue death and decreases your metabolism. Tar is a chemical that coats your lungs and trachea which impairs new oxygen coming in and carbon dioxide getting out of your body.   How does smoking impact surgery? - Smoking is particularly hazardous with regards to surgery. Surgery puts stress on the body and a smoker's body is already under strain from these chemicals. Putting the two together, especially for an elective surgery, could be a recipe for disaster. Smoking before and after surgery increases your risk of heart problems, slow wound healing, delayed bone healing, blood clots, wound infection and anesthesia complications.   What are the benefits of quitting? - In 20 minutes your blood pressure will drop back down to normal. In 8 hours the carbon monoxide (a toxic gas) levels in your blood stream will drop by half, and oxygen levels will return to normal. In 48 hours your chance of having a heart attack will have decreased. All nicotine will have left your body. Your sense of taste and smell will return to a normal level. In 72 hours your bronchial tubes will relax, and your energy levels will increase. In 2 weeks your circulation will increase, and it will continue to improve for the next 10 weeks.    Recommendations for elective surgery - Ideally, patients should quit smoking 8 weeks before and at least 2 weeks after elective surgery in order to avoid complications. Simply cutting back on the amount of cigarettes smoked per day does not offer any benefit or decrease the risk of poor wound healing, heart problems, and infection. Smokers should also start taking Vitamin C and B for two weeks before surgery and two weeks after surgery.    Ways to Stop Smokin. Nicotine patches, lozenges, or gum  2. Support Groups  3. Medications (see below)    List  of Medications:  1. Varenicline Tartrate (CHANTIX)   2. Bupropion HCL (WELLBUTRIN, ZYBAN)   note: make sure Wellbutrin is for smoking cessation and not other issues   3. Nicotine Patch (NICODERM)   4. Nicotine Inhaler (NICOTROL)   5. Nicotine Gum Nicotine Polacrilex   6. Nicotine Lozenge: Nicotine Polacrilex (COMMIT)   * [employer] offers a smoking support group as well!  Please visit: [email or web address]  If you are interested in these, ask about getting a prescription or talk to your primary care doctor about what may be the best way for you to quit.       Products available online if you do not have insurance coverage:    1.  Iodosorb topical wound care gel     2.  Woun'Dres topical wound care gel                      3.  Topical lidocaine gel

## 2019-03-07 NOTE — PROGRESS NOTES
"Foot & Ankle Surgery  March 7, 2019    S:  Patient in today approx 9 weeks sp ankle scope with open ATFL repair and excision of sinus tarsi mass.  Pain levels increased since yesterday, when they noticed drainage and redness along the incision.  They were in [place] recently, and she was doing a fair amount of walking, but denies swimming in the pools    /78   Ht 1.575 m (5' 2\")   Wt 68.9 kg (152 lb)   BMI 27.80 kg/m        ROS - positive for CC.  Patient denies current nausea, vomiting, chills, fevers, belly pain, calf pain, chest pain or SOB.  Complete remainder of ROS is otherwise neg.    PE - fibrous tissue along lateral incision with erythema/edema noted.  No pain with PROM of the ankle.  .  Skin shows no trophic, color or temperature changes otherwise.  No calf redness, swelling or pain noted otherwise.    A/P - 23 year old yo patient approx 9 weeks sp above procedure  -Rx for Clindamycin.  Extensive allergy list but this is not among them  -Rx and OTC med handout for iodosorb to be applied daily to wound, cover with bandage.  Ok to wash daily but avoid soaking.  -RICE/tylenol prn based on pain  -advised to go to ER if improvement is not noted with above plan    Follow up  -  1 week or sooner with acute issues      Body mass index is 27.8 kg/m .  Weight management plan: Patient was referred to their PCP to discuss a diet and exercise plan.      Andres Johnson DPM FACFAS FACFAOM  Podiatric Foot & Ankle Surgeon  [facility]  [phone]      "

## 2019-03-09 ENCOUNTER — HOSPITAL ENCOUNTER (INPATIENT)
Facility: CLINIC | Age: 24
LOS: 6 days | Discharge: HOME | DRG: 857 | End: 2019-03-16
Attending: EMERGENCY MEDICINE | Admitting: INTERNAL MEDICINE
Payer: COMMERCIAL

## 2019-03-09 ENCOUNTER — APPOINTMENT (OUTPATIENT)
Dept: GENERAL RADIOLOGY | Facility: CLINIC | Age: 24
DRG: 857 | End: 2019-03-09
Attending: STUDENT IN AN ORGANIZED HEALTH CARE EDUCATION/TRAINING PROGRAM
Payer: COMMERCIAL

## 2019-03-09 DIAGNOSIS — L02.419 ANKLE ABSCESS: Primary | ICD-10-CM

## 2019-03-09 DIAGNOSIS — M79.7 FIBROMYALGIA: ICD-10-CM

## 2019-03-09 DIAGNOSIS — K58.2 IRRITABLE BOWEL SYNDROME WITH BOTH CONSTIPATION AND DIARRHEA: ICD-10-CM

## 2019-03-09 DIAGNOSIS — L02.416 CELLULITIS AND ABSCESS OF LEFT LEG: ICD-10-CM

## 2019-03-09 DIAGNOSIS — L29.9 PRURITUS: ICD-10-CM

## 2019-03-09 DIAGNOSIS — L03.116 CELLULITIS AND ABSCESS OF LEFT LEG: ICD-10-CM

## 2019-03-09 PROBLEM — L03.90 CELLULITIS: Status: ACTIVE | Noted: 2019-03-09

## 2019-03-09 LAB
ALBUMIN SERPL-MCNC: 3.3 G/DL (ref 3.4–5)
ALP SERPL-CCNC: 74 U/L (ref 40–150)
ALT SERPL W P-5'-P-CCNC: 26 U/L (ref 0–50)
ANION GAP SERPL CALCULATED.3IONS-SCNC: 8 MMOL/L (ref 3–14)
AST SERPL W P-5'-P-CCNC: 20 U/L (ref 0–45)
BASOPHILS # BLD AUTO: 0 10E9/L (ref 0–0.2)
BASOPHILS NFR BLD AUTO: 0.2 %
BILIRUB SERPL-MCNC: 0.4 MG/DL (ref 0.2–1.3)
BUN SERPL-MCNC: 11 MG/DL (ref 7–30)
CALCIUM SERPL-MCNC: 7.8 MG/DL (ref 8.5–10.1)
CHLORIDE SERPL-SCNC: 104 MMOL/L (ref 94–109)
CO2 SERPL-SCNC: 23 MMOL/L (ref 20–32)
CREAT SERPL-MCNC: 0.77 MG/DL (ref 0.52–1.04)
CRP SERPL-MCNC: 84 MG/L (ref 0–8)
DIFFERENTIAL METHOD BLD: NORMAL
EOSINOPHIL # BLD AUTO: 0.1 10E9/L (ref 0–0.7)
EOSINOPHIL NFR BLD AUTO: 1.4 %
ERYTHROCYTE [DISTWIDTH] IN BLOOD BY AUTOMATED COUNT: 13.2 % (ref 10–15)
GFR SERPL CREATININE-BSD FRML MDRD: >90 ML/MIN/{1.73_M2}
GLUCOSE SERPL-MCNC: 162 MG/DL (ref 70–99)
GRAM STN SPEC: ABNORMAL
GRAM STN SPEC: ABNORMAL
HCT VFR BLD AUTO: 38.4 % (ref 35–47)
HGB BLD-MCNC: 12.4 G/DL (ref 11.7–15.7)
IMM GRANULOCYTES # BLD: 0 10E9/L (ref 0–0.4)
IMM GRANULOCYTES NFR BLD: 0.3 %
LYMPHOCYTES # BLD AUTO: 1.6 10E9/L (ref 0.8–5.3)
LYMPHOCYTES NFR BLD AUTO: 24.2 %
Lab: ABNORMAL
MCH RBC QN AUTO: 29.7 PG (ref 26.5–33)
MCHC RBC AUTO-ENTMCNC: 32.3 G/DL (ref 31.5–36.5)
MCV RBC AUTO: 92 FL (ref 78–100)
MONOCYTES # BLD AUTO: 0.3 10E9/L (ref 0–1.3)
MONOCYTES NFR BLD AUTO: 4.5 %
NEUTROPHILS # BLD AUTO: 4.6 10E9/L (ref 1.6–8.3)
NEUTROPHILS NFR BLD AUTO: 69.4 %
NRBC # BLD AUTO: 0 10*3/UL
NRBC BLD AUTO-RTO: 0 /100
PLATELET # BLD AUTO: 211 10E9/L (ref 150–450)
POTASSIUM SERPL-SCNC: 3.9 MMOL/L (ref 3.4–5.3)
PROT SERPL-MCNC: 7 G/DL (ref 6.8–8.8)
RBC # BLD AUTO: 4.18 10E12/L (ref 3.8–5.2)
SODIUM SERPL-SCNC: 136 MMOL/L (ref 133–144)
SPECIMEN SOURCE: ABNORMAL
WBC # BLD AUTO: 6.6 10E9/L (ref 4–11)

## 2019-03-09 PROCEDURE — 25000125 ZZHC RX 250: Performed by: EMERGENCY MEDICINE

## 2019-03-09 PROCEDURE — 87077 CULTURE AEROBIC IDENTIFY: CPT | Performed by: EMERGENCY MEDICINE

## 2019-03-09 PROCEDURE — 25000132 ZZH RX MED GY IP 250 OP 250 PS 637: Performed by: EMERGENCY MEDICINE

## 2019-03-09 PROCEDURE — 96361 HYDRATE IV INFUSION ADD-ON: CPT | Performed by: EMERGENCY MEDICINE

## 2019-03-09 PROCEDURE — 73610 X-RAY EXAM OF ANKLE: CPT | Mod: LT

## 2019-03-09 PROCEDURE — 25000128 H RX IP 250 OP 636: Performed by: INTERNAL MEDICINE

## 2019-03-09 PROCEDURE — 80053 COMPREHEN METABOLIC PANEL: CPT | Performed by: EMERGENCY MEDICINE

## 2019-03-09 PROCEDURE — 25000131 ZZH RX MED GY IP 250 OP 636 PS 637: Performed by: INTERNAL MEDICINE

## 2019-03-09 PROCEDURE — 86140 C-REACTIVE PROTEIN: CPT | Performed by: EMERGENCY MEDICINE

## 2019-03-09 PROCEDURE — 25000132 ZZH RX MED GY IP 250 OP 250 PS 637: Performed by: INTERNAL MEDICINE

## 2019-03-09 PROCEDURE — 96376 TX/PRO/DX INJ SAME DRUG ADON: CPT

## 2019-03-09 PROCEDURE — 25800030 ZZH RX IP 258 OP 636: Performed by: EMERGENCY MEDICINE

## 2019-03-09 PROCEDURE — 96365 THER/PROPH/DIAG IV INF INIT: CPT | Performed by: EMERGENCY MEDICINE

## 2019-03-09 PROCEDURE — 25000128 H RX IP 250 OP 636: Performed by: EMERGENCY MEDICINE

## 2019-03-09 PROCEDURE — 87205 SMEAR GRAM STAIN: CPT | Performed by: EMERGENCY MEDICINE

## 2019-03-09 PROCEDURE — 85025 COMPLETE CBC W/AUTO DIFF WBC: CPT | Performed by: EMERGENCY MEDICINE

## 2019-03-09 PROCEDURE — 99285 EMERGENCY DEPT VISIT HI MDM: CPT | Mod: Z6 | Performed by: EMERGENCY MEDICINE

## 2019-03-09 PROCEDURE — 87070 CULTURE OTHR SPECIMN AEROBIC: CPT | Performed by: EMERGENCY MEDICINE

## 2019-03-09 PROCEDURE — 96375 TX/PRO/DX INJ NEW DRUG ADDON: CPT | Performed by: EMERGENCY MEDICINE

## 2019-03-09 PROCEDURE — 99285 EMERGENCY DEPT VISIT HI MDM: CPT | Mod: 25 | Performed by: EMERGENCY MEDICINE

## 2019-03-09 PROCEDURE — G0378 HOSPITAL OBSERVATION PER HR: HCPCS

## 2019-03-09 PROCEDURE — 87186 SC STD MICRODIL/AGAR DIL: CPT | Performed by: EMERGENCY MEDICINE

## 2019-03-09 RX ORDER — NALOXONE HYDROCHLORIDE 0.4 MG/ML
.1-.4 INJECTION, SOLUTION INTRAMUSCULAR; INTRAVENOUS; SUBCUTANEOUS
Status: DISCONTINUED | OUTPATIENT
Start: 2019-03-09 | End: 2019-03-15

## 2019-03-09 RX ORDER — ACETAMINOPHEN 650 MG/1
650 SUPPOSITORY RECTAL EVERY 4 HOURS PRN
Status: DISCONTINUED | OUTPATIENT
Start: 2019-03-09 | End: 2019-03-10

## 2019-03-09 RX ORDER — OXYCODONE HYDROCHLORIDE 5 MG/1
5 TABLET ORAL EVERY 4 HOURS PRN
Status: DISCONTINUED | OUTPATIENT
Start: 2019-03-09 | End: 2019-03-10

## 2019-03-09 RX ORDER — CEFAZOLIN SODIUM 1 G/50ML
1250 SOLUTION INTRAVENOUS EVERY 12 HOURS
Status: DISCONTINUED | OUTPATIENT
Start: 2019-03-09 | End: 2019-03-09

## 2019-03-09 RX ORDER — ACETAMINOPHEN 325 MG/1
650 TABLET ORAL EVERY 4 HOURS PRN
Status: DISCONTINUED | OUTPATIENT
Start: 2019-03-09 | End: 2019-03-10

## 2019-03-09 RX ORDER — OXYCODONE HYDROCHLORIDE 5 MG/1
5 TABLET ORAL ONCE
Status: COMPLETED | OUTPATIENT
Start: 2019-03-09 | End: 2019-03-09

## 2019-03-09 RX ORDER — COLCHICINE 0.6 MG/1
0.6 TABLET ORAL 2 TIMES DAILY
Status: DISCONTINUED | OUTPATIENT
Start: 2019-03-09 | End: 2019-03-16 | Stop reason: HOSPADM

## 2019-03-09 RX ORDER — DIPHENHYDRAMINE HCL 25 MG
25 CAPSULE ORAL EVERY 6 HOURS PRN
Status: DISCONTINUED | OUTPATIENT
Start: 2019-03-09 | End: 2019-03-10

## 2019-03-09 RX ORDER — AMITRIPTYLINE HYDROCHLORIDE 50 MG/1
50 TABLET ORAL AT BEDTIME
Status: DISCONTINUED | OUTPATIENT
Start: 2019-03-09 | End: 2019-03-14

## 2019-03-09 RX ORDER — LACTULOSE 10 G/15ML
30 SOLUTION ORAL 3 TIMES DAILY PRN
Status: DISCONTINUED | OUTPATIENT
Start: 2019-03-09 | End: 2019-03-14

## 2019-03-09 RX ORDER — CLINDAMYCIN PHOSPHATE 900 MG/50ML
900 INJECTION, SOLUTION INTRAVENOUS EVERY 8 HOURS
Status: DISCONTINUED | OUTPATIENT
Start: 2019-03-09 | End: 2019-03-10

## 2019-03-09 RX ORDER — SODIUM CHLORIDE 9 MG/ML
1000 INJECTION, SOLUTION INTRAVENOUS CONTINUOUS
Status: DISCONTINUED | OUTPATIENT
Start: 2019-03-09 | End: 2019-03-10

## 2019-03-09 RX ORDER — DIPHENHYDRAMINE HYDROCHLORIDE AND LIDOCAINE HYDROCHLORIDE AND ALUMINUM HYDROXIDE AND MAGNESIUM HYDRO
10 KIT EVERY 6 HOURS PRN
Status: DISCONTINUED | OUTPATIENT
Start: 2019-03-09 | End: 2019-03-14

## 2019-03-09 RX ORDER — CYPROHEPTADINE HYDROCHLORIDE 4 MG/1
4 TABLET ORAL AT BEDTIME
Status: DISCONTINUED | OUTPATIENT
Start: 2019-03-09 | End: 2019-03-16 | Stop reason: HOSPADM

## 2019-03-09 RX ORDER — ONDANSETRON 2 MG/ML
4 INJECTION INTRAMUSCULAR; INTRAVENOUS EVERY 30 MIN PRN
Status: DISCONTINUED | OUTPATIENT
Start: 2019-03-09 | End: 2019-03-09

## 2019-03-09 RX ORDER — ONDANSETRON 4 MG/1
4 TABLET, ORALLY DISINTEGRATING ORAL EVERY 6 HOURS PRN
Status: DISCONTINUED | OUTPATIENT
Start: 2019-03-09 | End: 2019-03-16 | Stop reason: HOSPADM

## 2019-03-09 RX ORDER — ALBUTEROL SULFATE 90 UG/1
2 AEROSOL, METERED RESPIRATORY (INHALATION) EVERY 6 HOURS PRN
Status: DISCONTINUED | OUTPATIENT
Start: 2019-03-09 | End: 2019-03-10

## 2019-03-09 RX ORDER — ONDANSETRON 2 MG/ML
4 INJECTION INTRAMUSCULAR; INTRAVENOUS EVERY 6 HOURS PRN
Status: DISCONTINUED | OUTPATIENT
Start: 2019-03-09 | End: 2019-03-16 | Stop reason: HOSPADM

## 2019-03-09 RX ADMIN — COLCHICINE 0.6 MG: 0.6 TABLET, FILM COATED ORAL at 20:28

## 2019-03-09 RX ADMIN — ACETAMINOPHEN 650 MG: 325 TABLET, FILM COATED ORAL at 17:34

## 2019-03-09 RX ADMIN — CLINDAMYCIN IN 5 PERCENT DEXTROSE 900 MG: 18 INJECTION, SOLUTION INTRAVENOUS at 15:26

## 2019-03-09 RX ADMIN — ONDANSETRON 4 MG: 2 INJECTION INTRAMUSCULAR; INTRAVENOUS at 11:22

## 2019-03-09 RX ADMIN — SODIUM CHLORIDE 1000 ML: 9 INJECTION, SOLUTION INTRAVENOUS at 12:28

## 2019-03-09 RX ADMIN — OXYCODONE HYDROCHLORIDE 5 MG: 5 TABLET ORAL at 17:40

## 2019-03-09 RX ADMIN — ONDANSETRON 4 MG: 4 TABLET, ORALLY DISINTEGRATING ORAL at 22:59

## 2019-03-09 RX ADMIN — DIPHENHYDRAMINE HYDROCHLORIDE 25 MG: 25 CAPSULE ORAL at 20:28

## 2019-03-09 RX ADMIN — SODIUM CHLORIDE 1000 ML: 9 INJECTION, SOLUTION INTRAVENOUS at 11:20

## 2019-03-09 RX ADMIN — ONDANSETRON 4 MG: 2 INJECTION INTRAMUSCULAR; INTRAVENOUS at 17:34

## 2019-03-09 RX ADMIN — ACETAMINOPHEN 650 MG: 325 TABLET, FILM COATED ORAL at 22:01

## 2019-03-09 RX ADMIN — CLINDAMYCIN IN 5 PERCENT DEXTROSE 900 MG: 18 INJECTION, SOLUTION INTRAVENOUS at 22:59

## 2019-03-09 RX ADMIN — AMITRIPTYLINE HYDROCHLORIDE 50 MG: 50 TABLET, FILM COATED ORAL at 22:01

## 2019-03-09 RX ADMIN — OXYCODONE HYDROCHLORIDE 5 MG: 5 TABLET ORAL at 22:01

## 2019-03-09 RX ADMIN — OXYCODONE HYDROCHLORIDE 5 MG: 5 TABLET ORAL at 11:59

## 2019-03-09 ASSESSMENT — ENCOUNTER SYMPTOMS
CONFUSION: 0
SHORTNESS OF BREATH: 0
HEADACHES: 0
VOMITING: 0
FEVER: 0
CHILLS: 1
COLOR CHANGE: 0
NECK STIFFNESS: 0
DIFFICULTY URINATING: 0
NAUSEA: 1
ARTHRALGIAS: 0
ABDOMINAL PAIN: 0
EYE REDNESS: 0

## 2019-03-09 ASSESSMENT — MIFFLIN-ST. JEOR
SCORE: 1413.13
SCORE: 1462.58

## 2019-03-09 NOTE — ED NOTES
[facility], [place]   ED Nurse to Floor Handoff     Samara Oropeza is a 23 year old female who speaks English and lives with a spouse,  in a home  They arrived in the ED by car from home    ED Chief Complaint: Post-op Problem    ED Dx;   Final diagnoses:   Cellulitis and abscess of left leg         Needed?: No    Allergies:   Allergies   Allergen Reactions     Amoxil [Penicillins] Rash     Dad unsure of reaction.     Bee Venom Anaphylaxis     Contrast Dye Rash     Contrast Media Ready-Box [facility], 04/09/2014.; Contrast Media Ready-Box [facility], 04/09/2014.  NOTE: this is a contrast media oral with iodine. Premedicate with methylpred standard for IV contrast, request barium contrast for oral contrast.     Kiwi Swelling     Orange Fruit [Citrus] Anaphylaxis     Pineapple Anaphylaxis, Difficulty breathing and Rash     Reglan [Metoclopramide] Other (See Comments)     IV dose only, in ER, rapid heart rate.     Ace Inhibitors      Difficulty in breathing and GI upset     Amitiza [Lubiprostone] Nausea and Vomiting     Amoxicillin-Pot Clavulanate      Latex      Midazolam Unknown     Other reaction(s): Unknown  parent states that when pt takes this medication, she wakes up being very violent .  parent states that when pt takes this medication, she wakes up being very violent .     No Clinical Screening - See Comments      Bleech/ chest tightness, itchy throat, swollen tongue, hives     Versed      Coming out of pelvic exam at age of 5, was kicking and screaming when coming out of the versed.     Adhesive Tape Rash     Azithromycin Hives and Rash     Cephalexin Itching and Rash     Itchy mouth  Itchy mouth     Keflex [Cephalexin-Fd&C Yellow #6] Hives   .  Past Medical Hx:   Past Medical History:   Diagnosis Date     Anemia      Anxiety      Arthritis      Behcet's disease (H)      Cervical adenitis May 2010     Chronic abdominal pain      Constipation, chronic 1994     Fibromyalgia       Gastro-oesophageal reflux disease      Gastroparesis      Irregular heart beat     tachycardia, has had workup     Migraines      Neuromuscular disorder (H)     fibramyalgia     Palpitations      Seizure (H)      Seizures (H)     unknown etiology     Syncope      Tourette's       Baseline Mental status: WDL  Current Mental Status changes: at basesline    Infection present or suspected this encounter: no  Sepsis suspected: No  Isolation type: No active isolations     Activity level - Baseline/Home:  Stand with Assist  Activity Level - Current:   Stand with Assist    Bariatric equipment needed?: No    In the ED these meds were given:   Medications   0.9% sodium chloride BOLUS (0 mLs Intravenous Stopped 3/9/19 1224)     Followed by   sodium chloride 0.9% infusion (1,000 mLs Intravenous New Bag 3/9/19 1228)   ondansetron (ZOFRAN) injection 4 mg (4 mg Intravenous Given 3/9/19 1122)   oxyCODONE (ROXICODONE) tablet 5 mg (5 mg Oral Given 3/9/19 1159)       Drips running?  Yes    Home pump  No    Current LDAs  Peripheral IV 03/09/19 Right Upper forearm (Active)   Site Assessment WDL 3/9/2019 11:15 AM   Line Status Infusing 3/9/2019 11:15 AM   Phlebitis Scale 0-->no symptoms 3/9/2019 11:15 AM   Infiltration Scale 0 3/9/2019 11:15 AM   Infiltration Site Treatment Method  Warm compress 3/9/2019 11:15 AM   Extravasation? No 3/9/2019 11:15 AM   Dressing Intervention New dressing  3/9/2019 11:15 AM   Number of days: 0       Wound 03/09/19 Distal Foot Other (comment) Purulent drainage from surgical incisional site.  (Active)   Site Assessment Drainage;Red;Moist 3/9/2019 10:52 AM   Drainage Amount Small 3/9/2019 10:52 AM   Drainage Color/Charcteristics Purulent 3/9/2019 10:52 AM   Wound Care/Cleansing Barrier applied  3/9/2019 10:52 AM   Dressing Other (Comment) 3/9/2019 10:52 AM   Dressing Status Moist drainage 3/9/2019 10:52 AM   Number of days: 0       Incision/Surgical Site 01/02/19 Left Ankle (Active)   Number of days: 66  "      Labs results:   Labs Ordered and Resulted from Time of ED Arrival Up to the Time of Departure from the ED   COMPREHENSIVE METABOLIC PANEL - Abnormal; Notable for the following components:       Result Value    Glucose 162 (*)     Calcium 7.8 (*)     Albumin 3.3 (*)     All other components within normal limits   CRP INFLAMMATION - Abnormal; Notable for the following components:    CRP Inflammation 84.0 (*)     All other components within normal limits   CBC WITH PLATELETS DIFFERENTIAL   WOUND CULTURE AEROBIC BACTERIAL   GRAM STAIN       Imaging Studies:   Recent Results (from the past 24 hour(s))   XR Ankle Left G/E 3 Views    Narrative    XR ANKLE LT G/E 3 VW  3/9/2019 1:13 PM    History:  pain. Chronic left ankle pain s/p ankle scope (1/2/19),  redness and swelling along the incision site of the left foot    Comparison: Left ankle radiograph dated 1/2/2019    Findings:   AP, oblique and lateral view of the left ankle. No acute osseous  abnormality, ankle dislocation or subluxation. Mild soft tissue  swelling overlying lateral malleolus, slightly improved from 1/2/2019.        Impression    IMPRESSION:  No acute osseous abnormality.  Mild soft tissue swelling overlying lateral malleolus, slightly  improved from 1/2/2019.       I have personally reviewed the examination and initial interpretation  and I agree with the findings.    RYLEE APODACA MD       Recent vital signs:   /67   Pulse 84   Temp 98.3  F (36.8  C) (Oral)   Resp 17   Ht 1.6 m (5' 3\")   Wt 69.4 kg (153 lb)   LMP 03/09/2019   SpO2 98%   BMI 27.10 kg/m      Cardiac Rhythm: Normal Sinus  Pt needs tele? No  Skin/wound Issues: Patient has post-op surgical incision cellulitis    Code Status: Full Code    Pain control: fair    Nausea control: good    Abnormal labs/tests/findings requiring intervention: CRP of 84; may require antibiotics     Family present during ED course? Yes   Family Comments/Social Situation comments: Patient " currently lives with her boyfriend who helps her around the house; patient is on crutches secondary to surgical procedure in January and resulting post-op pain and cellulitis in left foot.  Patient will require foot soaks and dressing changes.     Tasks needing completion: None    SCOTT ZARATE, RN  [phone] [facility]

## 2019-03-09 NOTE — H&P
[facility], [place]    History and Physical - Hospitalist Service       Date of Admission:  3/9/2019    Assessment & Plan   Samara Oropeza is a 23 year old female w/ PMHx of the Bechets disease, chronic pain, fibromyalgia, seizure disorder, tourettes and hx of ankle injury s/p left ankle arthoscopy and ATFL repair 1/2/19 admitted on 3/9/2019 for left LE cellulitis around surgical incision.     #  Left lower extremity cellulitis surrounding surgical incision   # s/p left ankle arthoscopy and ATFL repair 1/2/19 for remote ankle injury   Patient presenting with worsening left lower extremity erythema, drainage and pain around surgical incision. Overall nonseptic appearing and exam consistent with cellulitis.  Seen by Ortho in ED, who agree with cellulitis with low suspicion for deep soft tissue abscess requiring surgical intervention.  Plan as below reflects ortho's evaluation.  -Orthopedics following   -IV clindamycin for 24 hours (startred in ED), likely transition to PO abx tomorrow   -Wound cultures pending  -Trend inflammatory markers (CRP, WBC)   -Wound cares per ortho   -Tylenol and oxycodone as needed for pain    ---Chronic medical conditions---    # Chronic pain  # Fibromyalgia  # Anxiety   -Continue PTA amitriptyline    # Bechets disease   -Continue PTA colchicine and oral cares     # IBS   -Continue PTA linaclotide    # GERD  -Continue PTA omeprazole    # Seizure d/o  -pt notes she is not on treatment        Diet: Regular Diet Adult    DVT Prophylaxis: Pneumatic Compression Devices  Wilson Catheter: not present  Code Status: Full Code      Disposition Plan   Expected discharge: Tomorrow, recommended to prior living arrangement once adequate pain management/ tolerating PO medications and antibiotic plan established.  Entered: Reza Rocha MD 03/09/2019, 5:31 PM     The patient's care was briefly discussed with the Attending Physician, Dr. Stacy.    Reza Rocha,  MD  [facility], [place]    ______________________________________________________________________    Chief Complaint   Left foot pain and drainage     History is obtained from the patient, mother and boyfriend     History of Present Illness   Samara Oropeza is a 23 year old female w/ PMHx of the Bechets disease, chronic pain, fibromyalgia, seizure disorder, tourettes and hx of ankle injury s/p left ankle arthoscopy and ATFL repair 1/2/19 who presented with progressive left foot pain and wound drainage.    Patient notes that 2 days ago she began to experience worsening left foot and ankle pain, noting that it felt like her foot was burning.  Also noted erythema of skin and clear to orange drainage from wound on top of foot from recent surgery.  Per chart review she underwent surgery with podiatry on January 2 after history of injury to left ankle 2 years ago.  She notes chills and subjective fevers however did not take her temperature.  Notes she is at her baseline state of health and was able to go to [place] recently on vacation.  She notes chronic abdominal pain as well as chronic nausea, both of which are at baseline.  She has had poor p.o. intake over the past 2 days given her foot pain and subjective fevers.  Per her mother, she was seen by podiatry 3/7/19 and treated with p.o. Clindamycin given increased erythema around her surgical wound.    At the [place] ED, she was afebrile and hemodynamically stable.  Labs including CBC and BMP were unremarkable.  Left ankle x-ray showed no acute osseous abnormality with mild soft tissue swelling overlying lateral malleolus which was slightly improved from previous exam on 1/2/19.  She was seen by Ortho with plans for admission for 24 hours of IV antibiotics.    Review of Systems    The 10 point Review of Systems is negative other than noted in the HPI or here.     Past Medical History    I have reviewed this patient's medical history  and updated it with pertinent information if needed.   Past Medical History:   Diagnosis Date     Anemia      Anxiety      Arthritis      Behcet's disease (H)      Cervical adenitis May 2010     Chronic abdominal pain      Constipation, chronic 1994     Fibromyalgia      Gastro-oesophageal reflux disease      Gastroparesis      Irregular heart beat     tachycardia, has had workup     Migraines      Neuromuscular disorder (H)     fibramyalgia     Palpitations      Seizure (H)      Seizures (H)     unknown etiology     Syncope      Tourette's        Past Surgical History   I have reviewed this patient's surgical history and updated it with pertinent information if needed.  Past Surgical History:   Procedure Laterality Date     ARTHROSCOPY ANKLE, REPAIR LIGAMENT Left 1/2/2019    Procedure: Ankle arthroscopy and sinus tarsi evacuation, ligament repair, left lower extremity;  Surgeon: Andres Johnson DPM;  Location: [facility] OR     ARTHROSCOPY KNEE WITH PATELLAR REALIGNMENT  7/25/2013    Procedure: ARTHROSCOPY KNEE WITH PATELLAR REALIGNMENT;  Left Knee Arthroscopy, Medial Patellofemoral Ligament Reconstruction with Allograft  ;  Surgeon: Jennifer Acevedo MD;  Location: [facility] OR     COLONOSCOPY  2015     DENTAL SURGERY  1996    Teeth removal     ENDOSCOPY UPPER, COLONOSCOPY, COMBINED  2005     HC ESOPH/GAS REFLUX TEST W NASAL IMPED >1 HR N/A 2/15/2017    Procedure: ESOPHAGEAL IMPEDENCE FUNCTION TEST WITH 24 HOUR PH GREATER THAN 1 HOUR;  Surgeon: Timothy Matta MD;  Location:  GI       Social History   I have reviewed this patient's social history and updated it with pertinent information if needed.  Social History     Tobacco Use     Smoking status: Never Smoker     Smokeless tobacco: Never Used   Substance Use Topics     Alcohol use: Yes     Alcohol/week: 0.6 oz     Types: 1 Standard drinks or equivalent per week     Comment: rarely     Drug use: No       Family History   I have reviewed this patient's family  history and updated it with pertinent information if needed.   Family History   Problem Relation Age of Onset     Depression Mother      Neurologic Disorder Mother         Migraines, take imitrex injection.  Also in maternal grandmother.       Alcohol/Drug Father      Hypertension Father      Depression Father      Osteoarthritis Father      Cardiovascular Maternal Grandmother      Depression Maternal Grandmother      Hypertension Maternal Grandmother      Alzheimer Disease Maternal Grandmother      Cardiovascular Maternal Grandfather      Hypertension Maternal Grandfather      Depression Maternal Grandfather      Alcohol/Drug Maternal Grandfather      Cardiovascular Paternal Grandmother      Hypertension Paternal Grandmother      Cardiovascular Paternal Grandfather      Hypertension Paternal Grandfather      Glaucoma No family hx of      Macular Degeneration No family hx of        Prior to Admission Medications   Prior to Admission Medications   Prescriptions Last Dose Informant Patient Reported? Taking?   Cadexomer Iodine, topical, 0.9% (IODOSORB) 0.9 % GEL gel   No No   Sig: Apply daily to left ankle wound   EPINEPHrine (EPIPEN 2-EAMON) 0.3 MG/0.3ML injection   No No   Sig: Inject 0.3 mLs (0.3 mg) into the muscle as needed for anaphylaxis   Lactase (LACTAID PO)  Self Yes No   Sig: Take by mouth daily   Magic Mouthwash (FV std formula) lidocaine visc 2% 2.5mL/5mL & maalox/mylanta w/ simeth 2.5mL/5mL & diphenhydrAMINE 5mg/5mL   No No   Sig: Swish and swallow 10 mLs in mouth every 6 hours as needed for mouth sores   Magnesium Aspartate HCl 1230 MG PACK   No No   Sig: Take 1 packet by mouth daily as needed   Patient not taking: Reported on 2/5/2019   OnabotulinumtoxinA (BOTOX IJ)   Yes No   Sig: Inject 165 Units into the muscle once Lot /C3  Exp 06/2020   OnabotulinumtoxinA (BOTOX IJ)   Yes No   Sig: Inject 165 Units as directed once Lot#: /C3  Exp: 10/2020   OnabotulinumtoxinA (BOTOX IJ)   Yes No   Sig:  Inject 165 Units as directed once Lot # /C3   Exp:  10/2020   OnabotulinumtoxinA (BOTOX IJ)   Yes No   Sig: Inject 175 Units into the muscle once Lot # /C3 with Expiration Date:  2020   OnabotulinumtoxinA (BOTOX IJ)   Yes No   Sig: Inject 175 Units into the muscle Lot: [id number]  Exp: 2021   OnabotulinumtoxinA (BOTOX IJ)   Yes No   Sig: Inject 175 Units as directed once /C3  2021   albuterol (PROAIR HFA/PROVENTIL HFA/VENTOLIN HFA) 108 (90 BASE) MCG/ACT Inhaler Unknown at Unknown time  No No   Sig: Inhale 2 puffs into the lungs every 6 hours as needed for shortness of breath / dyspnea or wheezing   amitriptyline (ELAVIL) 50 MG tablet 3/8/2019 at Unknown time  No Yes   Sig: Take 1 tablet (50 mg) by mouth At Bedtime   artificial tears OINT ophthalmic ointment   No No   Si.5 inch strip each eye at night   aspirin (ASPIRIN CHILDRENS) 81 MG chewable tablet   Yes No   Sig: Take 81 mg by mouth as needed    benzocaine (TOPICALE XTRA) 20 % GEL   No No   Sig: Apply as needed locally to mouth or nasal ulcers for pain; 4 times daily as needed   betamethasone valerate (VALISONE) 0.1 % cream   Yes No   Sig: Apply topically 2 times daily   botulinum toxin type A (BOTOX) 100 UNITS injection   No No   Sig: Inject 200 Units into the muscle every 3 months   clindamycin (CLEOCIN) 300 MG capsule   No No   Sig: Take 1 capsule (300 mg) by mouth 3 times daily for 10 days   clindamycin (CLEOCIN) 300 MG capsule 3/8/2019 at 2230  No Yes   Sig: Take 1 capsule (300 mg) by mouth 3 times daily for 10 days   clobetasol (TEMOVATE) 0.05 % cream   No No   Sig: Apply topically 2 times daily   colchicine (COLCYRS) 0.6 MG tablet 3/9/2019 at Unknown time  No Yes   Sig: Take 0.6 mg in AM and 0.6mg in PM every day   cyproheptadine (PERIACTIN) 4 MG tablet   No No   Sig: Take 1 tablet (4 mg) by mouth At Bedtime For nightmares   diclofenac (VOLTAREN) 1 % GEL topical gel   No No   Sig: Apply 2-4 grams to affected area(s) up to 4 times  per day as needed. This is an anti-inflammatory medication.   dicyclomine (BENTYL) 20 MG tablet 3/9/2019 at Unknown time  No Yes   Sig: Take 1 tablet (20 mg) by mouth 4 times daily as needed   diltiazem 2% in PLO cream, FV COMPOUNDED, 2% GEL   No No   Sig: Apply small pea size amount three times daily to anus until pain is gone.   guanFACINE (TENEX) 1 MG tablet   No No   Sig: Take 3 tablets (3 mg) by mouth twice daily in the morning and evening.   hydrOXYzine (VISTARIL) 25 MG capsule 3/9/2019 at Unknown time  No Yes   Sig: Take 1-2 tablets every 4-6 hours as needed for pain control.   hydrOXYzine (VISTARIL) 25 MG capsule   No No   Sig: Take 1-2 tablets every 4-6 hours as needed for pain control.   hydrocortisone 1 % CREA cream   No No   Sig: Place rectally 2 times daily as needed for itching   hyoscyamine (ANASPAZ/LEVSIN) 0.125 MG tablet   No No   Sig: Take 1-2 tablets (125-250 mcg) by mouth every 4 hours as needed for cramping   hypromellose (GENTEAL) 0.3 % SOLN   Yes No   Si drop every hour as needed for dry eyes    ibuprofen (ADVIL/MOTRIN) 600 MG tablet 3/6/2019  No No   Sig: Take 600mg of ibuprofen every 6 hours x 5 days to assist in pain/inflammation control.  If you are not tolerating the medication, you are ok to stop.   lactulose 20 GM/30ML SOLN   No No   Sig: Take 30 mLs by mouth 3 times daily as needed (for constipation)   lidocaine (XYLOCAINE) 2 % jelly  Self Yes No   Sig: Apply 1 Tube topically daily Reported on 2017   linaclotide (LINZESS) 145 MCG capsule   No No   Sig: Take 1 capsule (145 mcg) by mouth every morning (before breakfast)   nitroFURantoin macrocrystal-monohydrate (MACROBID) 100 MG capsule   No No   Sig: Take 1 capsule (100 mg) by mouth At Bedtime   norgestimate-ethinyl estradiol (ORTHO-CYCLEN/SPRINTEC) 0.25-35 MG-MCG tablet   No No   Sig: Take 1 tablet by mouth daily   omeprazole (PRILOSEC) 40 MG capsule   No No   Sig: Take 1 capsule (40 mg) by mouth daily   ondansetron (ZOFRAN) 8  MG tablet Past Week at Unknown time  No Yes   Sig: Take 1 tablet (8 mg) by mouth every 8 hours as needed for nausea   order for DME   No No   Sig: Equipment being ordered: adult pair of crutches   order for DME   No No   Sig: Equipment being ordered: RollAbout knee scooter   order for DME   No No   Sig: Equipment being ordered: size 8 1/2 walking boot tall   order for DME   No No   Sig: Roll-A-Bout Walker. Patient can use for 3 months   oxyCODONE-acetaminophen (PERCOCET) 5-325 MG tablet   No No   Sig: Take 1-2 tablets every 4-6 hours as needed for pain.  Do not take other tylenol products with this medication, as too much tylenol can be damaging to the liver.   oxyCODONE-acetaminophen (PERCOCET) 5-325 MG tablet   No No   Sig: Take 1-2 tablets every 4-6 hours as needed for pain.  Do not take other tylenol products with this medication, as too much tylenol can be damaging to the liver.   polyethylene glycol (MIRALAX/GLYCOLAX) powder 3/9/2019 at Unknown time  Yes Yes   Sig: Take 1 capful by mouth 3 times daily   sennosides (SENOKOT) 8.6 MG tablet   Yes No   Sig: Take 1 tablet by mouth daily   sucralfate (CARAFATE) 1 GM/10ML suspension   No No   Sig: Take 10 mLs (1 g) by mouth 4 times daily   sulfamethoxazole-trimethoprim (BACTRIM DS/SEPTRA DS) 800-160 MG per tablet   No No   Sig: Take 1 tablet by mouth 2 times daily for 14 days   sulfamethoxazole-trimethoprim (BACTRIM DS/SEPTRA DS) 800-160 MG tablet   No No   Sig: Take 1 tablet by mouth 2 times daily for 3 days   triamcinolone (KENALOG) 0.1 % cream   No No   Sig: Apply sparingly to lesions twice daily as needed   triamcinolone (KENALOG-40) 40 MG/ML injection   No No   Si mL (40 mg) by INTRA-ARTICULAR route once for 1 dose      Facility-Administered Medications: None     Allergies   Allergies   Allergen Reactions     Amoxil [Penicillins] Rash     Dad unsure of reaction.     Bee Venom Anaphylaxis     Contrast Dye Rash     [place] [facility] [place]  Contrast Media Ready-Box [facility] [place] 04/09/[place].  NOTE: this is a contrast media oral with iodine. Premedicate with methylpred standard for IV contrast, request barium contrast for oral contrast.     Kiwi Swelling     Orange Fruit [Citrus] Anaphylaxis     Pineapple Anaphylaxis, Difficulty breathing and Rash     Reglan [Metoclopramide] Other (See Comments)     IV dose only, in ER, rapid heart rate.     Ace Inhibitors      Difficulty in breathing and GI upset     Amitiza [Lubiprostone] Nausea and Vomiting     Amoxicillin-Pot Clavulanate      Latex      Midazolam Unknown     Other reaction(s): Unknown  parent states that when pt takes this medication, she wakes up being very violent .  parent states that when pt takes this medication, she wakes up being very violent .     No Clinical Screening - See Comments      Bleech/ chest tightness, itchy throat, swollen tongue, hives     Versed      Coming out of pelvic exam at age of 5, was kicking and screaming when coming out of the versed.     Adhesive Tape Rash     Azithromycin Hives and Rash     Cephalexin Itching and Rash     Itchy mouth  Itchy mouth     Keflex [Cephalexin-Fd&C Yellow #6] Hives       Physical Exam   Vital Signs: Temp: 97.4  F (36.3  C) Temp src: Oral BP: 118/64 Pulse: 96 Heart Rate: 83 Resp: 14 SpO2: 100 % O2 Device: None (Room air)    Weight: 163 lbs 14.4 oz    GEN: No acute distress  HEENT: NC/AT, EOMI, PERRLA  Respiratory: CTAB  CV: RRR, no M/R/G  Abdomen: Slight generalized tenderness to deep palpation, no rebound or guarding (patient notes this is chronic abdominal pain)  Lower extremities: 2 incisions noted on left ankle, more lateral incision is covered in white fibrinous appearing material.  Left ankle is erythematous, slightly warm and significantly TTP with surrounding swelling, no purulent discharge noted and no focal fluctuance.  Toes are warm.  Neuro: ANO x3, no focal deficits.     Data   Data reviewed today: I reviewed all medications, new labs and  imaging results over the last 24 hours. I personally reviewed the ankle XR image(s) showing no acute osseus abnormality .    Recent Labs   Lab 03/09/19  1120   WBC 6.6   HGB 12.4   MCV 92         POTASSIUM 3.9   CHLORIDE 104   CO2 23   BUN 11   CR 0.77   ANIONGAP 8   SHANKAR 7.8*   *   ALBUMIN 3.3*   PROTTOTAL 7.0   BILITOTAL 0.4   ALKPHOS 74   ALT 26   AST 20

## 2019-03-09 NOTE — PHARMACY-VANCOMYCIN DOSING SERVICE
Pharmacy Vancomycin Initial Note  Date of Service 2019  Patient's  1995  23 year old, female    Indication: Skin and Soft Tissue Infection (left foot wound cx: GPCs)    Current estimated CrCl = Estimated Creatinine Clearance: 108.8 mL/min (based on SCr of 0.77 mg/dL).    Creatinine for last 3 days  3/9/2019: 11:20 AM Creatinine 0.77 mg/dL    Recent Vancomycin Level(s) for last 3 days  No results found for requested labs within last 72 hours.      Vancomycin IV Administrations (past 72 hours)      No vancomycin orders with administrations in past 72 hours.                Nephrotoxins and other renal medications (From now, onward)    Start     Dose/Rate Route Frequency Ordered Stop    19 1730  vancomycin (VANCOCIN) 1,250 mg in sodium chloride 0.9 % 250 mL intermittent infusion      1,250 mg  over 90 Minutes Intravenous EVERY 12 HOURS 19 1725            Contrast Orders - past 72 hours (72h ago, onward)    None                Plan:  1.  Start vancomycin 1250 mg IV q12h (16.9 mg/kg based on ABW of 74.3 kg).   2.  Goal Trough Level: 10-15 mg/L (no osseous involvement, no hx of MRSA). Adjust trough goal based on susceptibilities.   3.  Pharmacy will check trough levels as appropriate in 1-3 Days.    4. Serum creatinine levels will be ordered a minimum of twice weekly.    5. [place] method utilized to dose vancomycin therapy: Method 1    Caitlyn Draper    Addendum: Vancomycin discontinued prior to being given.  Caitlyn Draper, Pharm.D.  PGY-1 Pharmacy Practice Resident

## 2019-03-09 NOTE — CONSULTS
[facility]  ORTHOPAEDIC SURGERY CONSULT - HISTORY AND PHYSICAL    DATE OF CONSULT: 3/9/2019 11:45 AM    REQUESTING PROVIDER: Hussein Meehan MD, MD - N Staff.    CC: L ankle post-operative infection    HISTORY OF PRESENT ILLNESS:   Samara Oropeza is a 23 year old female with PMH including anxiety, Behcet's disease, fibromyalgia, GERD, migraines, seizure disorder, Tourette's and s/p L ankle arthroscopy and ATFL repair on 1/2 by Dr. Johnson presenting with increasing erythema, drainage and pain surrounding her surgical incision. Per chart review, the patient underwent the aforementioned surgery at an OSH with an outside Podiatry group. She has had difficulty with wound healing in the post-operative course which has been managed with local wound care. In February, she was placed on a short course of PO Clindamycin due to the appearance of the wound and per chart review, this responded appropriately. Unfortunately, she developed further drainage from her wound dehiscence on 3/7 and was evaluated in Dr. Johnson's clinic. He prescribed her local wound cares and PO Clindamycin. She has been compliant with antibiotic treatment but the erythema has increased, as well as the amount of drainage. Serosanguinous drainage. She is changing her dressing twice daily. Endorses chills at home but has not documented fever. Denies numbness or tingling, motor dysfunction or weakness. Afebrile upon presentation to the ED today.     Nursing and physician Emergency Department notes reviewed and HPI updated to reflect their ED course.     PAST MEDICAL HISTORY:   Past Medical History:   Diagnosis Date     Anemia      Anxiety      Arthritis      Behcet's disease (H)      Cervical adenitis May 2010     Chronic abdominal pain      Constipation, chronic 1994     Fibromyalgia      Gastro-oesophageal reflux disease      Gastroparesis      Irregular heart beat     tachycardia, has had workup     Migraines       Neuromuscular disorder (H)     fibramyalgia     Palpitations      Seizure (H)      Seizures (H)     unknown etiology     Syncope      Tourette's      Patient denies any personal history of bleeding disorders, clotting disorders, or adverse reactions to anesthesia.    PAST SURGICAL HISTORY:   Past Surgical History:   Procedure Laterality Date     ARTHROSCOPY ANKLE, REPAIR LIGAMENT Left 1/2/2019    Procedure: Ankle arthroscopy and sinus tarsi evacuation, ligament repair, left lower extremity;  Surgeon: Andres Johnson DPM;  Location: [facility] OR     ARTHROSCOPY KNEE WITH PATELLAR REALIGNMENT  7/25/2013    Procedure: ARTHROSCOPY KNEE WITH PATELLAR REALIGNMENT;  Left Knee Arthroscopy, Medial Patellofemoral Ligament Reconstruction with Allograft  ;  Surgeon: Jennifer Acevedo MD;  Location: [facility]     COLONOSCOPY  2015     DENTAL SURGERY  1996    Teeth removal     ENDOSCOPY UPPER, COLONOSCOPY, COMBINED  2005     HC ESOPH/GAS REFLUX TEST W NASAL IMPED >1 HR N/A 2/15/2017    Procedure: ESOPHAGEAL IMPEDENCE FUNCTION TEST WITH 24 HOUR PH GREATER THAN 1 HOUR;  Surgeon: Timothy Matta MD;  Location:  GI     MEDICATIONS:   Prior to Admission medications    Medication Sig Last Dose Taking? Auth Provider   amitriptyline (ELAVIL) 50 MG tablet Take 1 tablet (50 mg) by mouth At Bedtime 3/8/2019 at Unknown time Yes Sonja Abreu APRN CNP   clindamycin (CLEOCIN) 300 MG capsule Take 1 capsule (300 mg) by mouth 3 times daily for 10 days 3/8/2019 at 2230 Yes Andres Johnson DPM   colchicine (COLCYRS) 0.6 MG tablet Take 0.6 mg in AM and 0.6mg in PM every day 3/9/2019 at Unknown time Yes Austen Marquez MD   dicyclomine (BENTYL) 20 MG tablet Take 1 tablet (20 mg) by mouth 4 times daily as needed 3/9/2019 at Unknown time Yes Sonja Abreu APRN CNP   hydrOXYzine (VISTARIL) 25 MG capsule Take 1-2 tablets every 4-6 hours as needed for pain control. 3/9/2019 at Unknown time Yes Andres Johnson DPM    ondansetron (ZOFRAN) 8 MG tablet Take 1 tablet (8 mg) by mouth every 8 hours as needed for nausea Past Week at Unknown time Yes Austen Marquez MD   polyethylene glycol (MIRALAX/GLYCOLAX) powder Take 1 capful by mouth 3 times daily 3/9/2019 at Unknown time Yes Reported, Patient   albuterol (PROAIR HFA/PROVENTIL HFA/VENTOLIN HFA) 108 (90 BASE) MCG/ACT Inhaler Inhale 2 puffs into the lungs every 6 hours as needed for shortness of breath / dyspnea or wheezing Unknown at Unknown time  Kacie Almeida APRN CNP   artificial tears OINT ophthalmic ointment 0.5 inch strip each eye at night   Umer Heaton MD   aspirin (ASPIRIN CHILDRENS) 81 MG chewable tablet Take 81 mg by mouth as needed    Reported, Patient   benzocaine (TOPICALE XTRA) 20 % GEL Apply as needed locally to mouth or nasal ulcers for pain; 4 times daily as needed   Austen Marquez MD   betamethasone valerate (VALISONE) 0.1 % cream Apply topically 2 times daily   Reported, Patient   botulinum toxin type A (BOTOX) 100 UNITS injection Inject 200 Units into the muscle every 3 months   Frederick Bender MD   Cadexomer Iodine, topical, 0.9% (IODOSORB) 0.9 % GEL gel Apply daily to left ankle wound   Andres Johnson DPM   clobetasol (TEMOVATE) 0.05 % cream Apply topically 2 times daily   Esau Elliott MD   cyproheptadine (PERIACTIN) 4 MG tablet Take 1 tablet (4 mg) by mouth At Bedtime For nightmares   Sonja Abreu APRN CNP   diclofenac (VOLTAREN) 1 % GEL topical gel Apply 2-4 grams to affected area(s) up to 4 times per day as needed. This is an anti-inflammatory medication.   Sonja Abreu APRN CNP   diltiazem 2% in PLO cream, FV COMPOUNDED, 2% GEL Apply small pea size amount three times daily to anus until pain is gone.   Kacie Almeida APRN CNP   EPINEPHrine (EPIPEN 2-EAMON) 0.3 MG/0.3ML injection Inject 0.3 mLs (0.3 mg) into the muscle as needed for anaphylaxis   Sonja Abreu, EVI  CNP   guanFACINE (TENEX) 1 MG tablet Take 3 tablets (3 mg) by mouth twice daily in the morning and evening.   Sonja Abreu APRN CNP   hydrocortisone 1 % CREA cream Place rectally 2 times daily as needed for itching   Sonja Abreu APRN CNP   hydrOXYzine (VISTARIL) 25 MG capsule Take 1-2 tablets every 4-6 hours as needed for pain control.   Andres Johnson DPM   hyoscyamine (ANASPAZ/LEVSIN) 0.125 MG tablet Take 1-2 tablets (125-250 mcg) by mouth every 4 hours as needed for cramping   Sonja Abreu APRN CNP   hypromellose (GENTEAL) 0.3 % SOLN 1 drop every hour as needed for dry eyes    Reported, Patient   ibuprofen (ADVIL/MOTRIN) 600 MG tablet Take 600mg of ibuprofen every 6 hours x 5 days to assist in pain/inflammation control.  If you are not tolerating the medication, you are ok to stop. 3/6/2019  Andres Johnson DPM   Lactase (LACTAID PO) Take by mouth daily   Reported, Patient   lactulose 20 GM/30ML SOLN Take 30 mLs by mouth 3 times daily as needed (for constipation)   Sonja Abreu APRN CNP   lidocaine (XYLOCAINE) 2 % jelly Apply 1 Tube topically daily Reported on 4/21/2017   Reported, Patient   linaclotide (LINZESS) 145 MCG capsule Take 1 capsule (145 mcg) by mouth every morning (before breakfast)   Sonja Abreu APRN CNP   Magic Mouthwash (FV std formula) lidocaine visc 2% 2.5mL/5mL & maalox/mylanta w/ simeth 2.5mL/5mL & diphenhydrAMINE 5mg/5mL Swish and swallow 10 mLs in mouth every 6 hours as needed for mouth sores   Austen Marquez MD   Magnesium Aspartate HCl 1230 MG PACK Take 1 packet by mouth daily as needed  Patient not taking: Reported on 2/5/2019   Sonja Abreu APRN CNP   nitroFURantoin macrocrystal-monohydrate (MACROBID) 100 MG capsule Take 1 capsule (100 mg) by mouth At Bedtime   Sonja Abreu APRN CNP   norgestimate-ethinyl estradiol (ORTHO-CYCLEN/SPRINTEC) 0.25-35 MG-MCG tablet Take 1 tablet by mouth daily   Bre York  TATI Freeman   omeprazole (PRILOSEC) 40 MG capsule Take 1 capsule (40 mg) by mouth daily   Sonja Abreu APRN CNP   OnabotulinumtoxinA (BOTOX IJ) Inject 175 Units into the muscle Lot: [id number]  Exp: 01/2021   Reported, Patient   OnabotulinumtoxinA (BOTOX IJ) Inject 175 Units into the muscle once Lot # /C3 with Expiration Date:  11/2020   Frederick Bender MD   OnabotulinumtoxinA (BOTOX IJ) Inject 165 Units as directed once Lot#: /C3  Exp: 10/2020   Reported, Patient   OnabotulinumtoxinA (BOTOX IJ) Inject 165 Units as directed once Lot # /C3   Exp:  10/2020   Reported, Patient   OnabotulinumtoxinA (BOTOX IJ) Inject 165 Units into the muscle once Lot /C3  Exp 06/2020   Frederick Bender MD   order for DME Roll-A-Bout Walker. Patient can use for 3 months   Andres Johnson DPM   order for DME Equipment being ordered: size 8 1/2 walking boot tall   Andres Johnson DPM   order for DME Equipment being ordered: RollAbout knee scooter   Andres Johnson DPM   order for DME Equipment being ordered: adult pair of crutches   Andres Johnson DPM   oxyCODONE-acetaminophen (PERCOCET) 5-325 MG tablet Take 1-2 tablets every 4-6 hours as needed for pain.  Do not take other tylenol products with this medication, as too much tylenol can be damaging to the liver.   Andres Johnson DPM   oxyCODONE-acetaminophen (PERCOCET) 5-325 MG tablet Take 1-2 tablets every 4-6 hours as needed for pain.  Do not take other tylenol products with this medication, as too much tylenol can be damaging to the liver.   Andres Johnson DPM   sennosides (SENOKOT) 8.6 MG tablet Take 1 tablet by mouth daily   Reported, Patient   sucralfate (CARAFATE) 1 GM/10ML suspension Take 10 mLs (1 g) by mouth 4 times daily   Sonja Abreu APRN CNP   triamcinolone (KENALOG) 0.1 % cream Apply sparingly to lesions twice daily as needed   Sonja Abreu APRN CNP     ALLERGIES:   Amoxil [penicillins];  "Bee venom; Contrast dye; Kiwi; Orange fruit [citrus]; Pineapple; Reglan [metoclopramide]; Ace inhibitors; Amitiza [lubiprostone]; Amoxicillin-pot clavulanate; Latex; Midazolam; No clinical screening - see comments; Versed; Adhesive tape; Azithromycin; Cephalexin; and Keflex [cephalexin-fd&c yellow #6]    SOCIAL HISTORY:   Living situation: Patient lives in [place].   Tobacco: Denies.   Alcohol: Rare.   Illicit Drugs: Denies.     FAMILY HISTORY:  Patient denies known family history of bleeding, clotting, or anesthesia related complications.     REVIEW OF SYSTEMS:   Otherwise, a 10-point reviews of systems was negative except as noted above in the HPI.     PHYSICAL EXAM:   Vitals:    03/09/19 1033 03/09/19 1036 03/09/19 1100   BP:  118/75 121/78   Pulse:   96   Resp:  17    Temp: 98.3  F (36.8  C)     TempSrc: Oral     SpO2:  98%    Weight: 69.4 kg (153 lb)     Height: 1.6 m (5' 3\")       General: Awake, alert, appropriate, following commands, NAD.  Neuro: CN II-XII grossly intact.   Psych: Appropriate affect.   Skin: See MSK for extremity exam. Otherwise, no rashes,  skin color normal.  HEENT: Normal.   Lungs: Breathing comfortably and nonlabored, no wheezes or stridor noted.  Heart/Cardiovascular: Regular pulse, no peripheral cyanosis.  Left Lower Extremity: Previous lateral surgical incision with two areas of dehiscence and overlying fibrinous material. Surrounding erythema which has been previously marked - does not extend beyond ankle. Associated swelling. No focal fluctuance appreciated. Serous drainage expressed from the wound - no purulence. Motor intact distally TA/GSC/EHL/FHL. SILT sp/dp/tibial/saph/sural nerves. DP/PT pulses palpable, 2+, toes warm and well perfused.     LABS:  WBC 6.6  Hgb 12.4  Plts 211  CRP 84  CMP in process  Wound Cx: In process.     IMAGING: No new imaging to review.     ASSESSMENT AND PLAN:   Samara Oropeza is a 23 year old female with PMH including anxiety, Behcet's disease, " fibromyalgia, GERD, migraines, seizure disorder, Tourette's and s/p L ankle arthroscopy and ATFL repair on 1/2 by Dr. Johnson presenting with increasing erythema, drainage and pain surrounding her surgical incision, found to have cellulitis. Based on current physical exam, no concern for deep soft tissue abscess requiring surgical intervention.     Admit to Internal Medicine.  Plan for OR: No surgical indication at this time. Will monitor clinical response to IV antibiotics.   Activity: Up with assist until independent.  Weight bearing status: WBAT with CAM boot as instructed by her surgeon.   Pain management: Per primary.    Antibiotics/Tetanus: Per primary. Recommend IV antibiotics x at least 24 hours.  Diet: Per primary. Okay for a diet from Orthopedic Surgery perspective. NPO after midnight pending wound check tomorrow AM.    Anticoagulation/DVT prophylaxis: Per primary.    Imaging: XR L Ankle 3V ordered.  Labs: Monitor inflammatory markers.  Bracing/Splinting: CAM boot as instructed by her surgeon.    Dressings: Keep clean, dry and intact - recommend covering wound with non-adhesive dressing such as adaptic, gauze and ACE bandage.   Elevation: Elevate LLE on pillows to keep above the level of the heart as much as possible.   Cultures: Pending, follow culture results closely.    Follow-up: Clinic with Dr. Johnson within 1 week.   Disposition: Pending clinical response - anticipate discharge to home in 1-2 days.     Case and plan discussed with Dr. Meehan from the ED.     Assessment and Plan discussed with Dr. Glover, Orthopaedic Surgery attending.     Miley Esteves MD  Orthopaedic Surgery Resident, PGY-4  Pager: [phone]     For questions about this patient during weekday business hours, please attempt to contact me at my pager prior to contacting the Orthopaedic Surgery resident on call. On the weekends and overnight, please page the Orthopaedic Surgery resident on call. Thank you!

## 2019-03-09 NOTE — ED TRIAGE NOTES
Presents with redness/swelling at incision site on left foot. Patient had surgery 1/2/19, states she noticed some redness this past Wednesday. Patient saw her surgery team on Thursday and was instructed to come into ER for evaluation if the pain, redness & swelling worsened.

## 2019-03-09 NOTE — ED PROVIDER NOTES
[place] EMERGENCY DEPARTMENT ([facility])  3/09/19    History     Chief Complaint   Patient presents with     Post-op Problem     The history is provided by the patient, a parent and medical records.     Samara Oropeza is a 23 year old female with a past medical history significant for chronic left ankle pain s/p ankle scope (1/2/19), anxiety, depression, and Behcet's disease who presents to the Emergency Department today due redness and swelling on the incision site on her left foot. Patient had surgery on 1/2/19 for a left ankle arthroscopy and sinus tarsi evacuation. Patient had a soft tissue mass from the sinus tarsi measuring approximately 1.5 cm x 1.5 cm x 1.5 cm. Patient was nonweightbearing and was told to follow up with the surgery doctor after 1 week.    Per chart review patient was seen on 3/7/19 with her surgeon complaining of increased pain in the incision site as well as drainage and redness around the incision. Patient was given cadexomer iodine 0.9 % to apply daily to wound and Clindamycin 300 mg 3 times a day. Patient was told to visit the ED if symptoms worsen.    Patient presented to the Emergency Department today with worsening in pain, increased redness and discharge from the incision site. Patient reports that her redness has spread over most of her ankle. Patient says her pain is so sever she is unable to touch her ankle or put pressure on her ankle. Patient reports the worse pain is on top of her foot which is a sharp pain. Patient also reports numbness on the side of her foot. Patient states that she is having discharge that has changed to green from the initial white. Patient reports having chills and sweats that started 3/6/19. These symptoms come and go according to the patient. Patient also reports nausea causing a lack of appetite. Patient denies any fevers or vomiting. Patient has been taking ibuprofen and Tylenol for her pain.     I have reviewed the Medications,  Allergies, Past Medical and Surgical History, and Social History in the [facility] system.    Past Medical History:   Diagnosis Date     Anemia      Anxiety      Arthritis      Behcet's disease (H)      Cervical adenitis May 2010     Chronic abdominal pain      Constipation, chronic 1994     Fibromyalgia      Gastro-oesophageal reflux disease      Gastroparesis      Irregular heart beat     tachycardia, has had workup     Migraines      Neuromuscular disorder (H)     fibramyalgia     Palpitations      Seizure (H)      Seizures (H)     unknown etiology     Syncope      Tourette's        Past Surgical History:   Procedure Laterality Date     ARTHROSCOPY ANKLE, REPAIR LIGAMENT Left 1/2/2019    Procedure: Ankle arthroscopy and sinus tarsi evacuation, ligament repair, left lower extremity;  Surgeon: Andres Johnson DPM;  Location:  OR     ARTHROSCOPY KNEE WITH PATELLAR REALIGNMENT  7/25/2013    Procedure: ARTHROSCOPY KNEE WITH PATELLAR REALIGNMENT;  Left Knee Arthroscopy, Medial Patellofemoral Ligament Reconstruction with Allograft  ;  Surgeon: Jennifer Acevedo MD;  Location: [facility]     COLONOSCOPY  2015     DENTAL SURGERY  1996    Teeth removal     ENDOSCOPY UPPER, COLONOSCOPY, COMBINED  2005     HC ESOPH/GAS REFLUX TEST W NASAL IMPED >1 HR N/A 2/15/2017    Procedure: ESOPHAGEAL IMPEDENCE FUNCTION TEST WITH 24 HOUR PH GREATER THAN 1 HOUR;  Surgeon: Timothy Matta MD;  Location:  GI       Family History   Problem Relation Age of Onset     Depression Mother      Neurologic Disorder Mother         Migraines, take imitrex injection.  Also in maternal grandmother.       Alcohol/Drug Father      Hypertension Father      Depression Father      Osteoarthritis Father      Cardiovascular Maternal Grandmother      Depression Maternal Grandmother      Hypertension Maternal Grandmother      Alzheimer Disease Maternal Grandmother      Cardiovascular Maternal Grandfather      Hypertension Maternal Grandfather       Depression Maternal Grandfather      Alcohol/Drug Maternal Grandfather      Cardiovascular Paternal Grandmother      Hypertension Paternal Grandmother      Cardiovascular Paternal Grandfather      Hypertension Paternal Grandfather      Glaucoma No family hx of      Macular Degeneration No family hx of        Social History     Tobacco Use     Smoking status: Never Smoker     Smokeless tobacco: Never Used   Substance Use Topics     Alcohol use: Yes     Alcohol/week: 0.6 oz     Types: 1 Standard drinks or equivalent per week     Comment: rarely       Current Facility-Administered Medications   Medication     clindamycin (CLEOCIN) infusion 900 mg     ondansetron (ZOFRAN) injection 4 mg     sodium chloride 0.9% infusion     Current Outpatient Medications   Medication     amitriptyline (ELAVIL) 50 MG tablet     clindamycin (CLEOCIN) 300 MG capsule     colchicine (COLCYRS) 0.6 MG tablet     dicyclomine (BENTYL) 20 MG tablet     hydrOXYzine (VISTARIL) 25 MG capsule     ondansetron (ZOFRAN) 8 MG tablet     polyethylene glycol (MIRALAX/GLYCOLAX) powder     albuterol (PROAIR HFA/PROVENTIL HFA/VENTOLIN HFA) 108 (90 BASE) MCG/ACT Inhaler     artificial tears OINT ophthalmic ointment     aspirin (ASPIRIN CHILDRENS) 81 MG chewable tablet     benzocaine (TOPICALE XTRA) 20 % GEL     betamethasone valerate (VALISONE) 0.1 % cream     botulinum toxin type A (BOTOX) 100 UNITS injection     Cadexomer Iodine, topical, 0.9% (IODOSORB) 0.9 % GEL gel     clobetasol (TEMOVATE) 0.05 % cream     cyproheptadine (PERIACTIN) 4 MG tablet     diclofenac (VOLTAREN) 1 % GEL topical gel     diltiazem 2% in PLO cream, FV COMPOUNDED, 2% GEL     EPINEPHrine (EPIPEN 2-EAMON) 0.3 MG/0.3ML injection     guanFACINE (TENEX) 1 MG tablet     hydrocortisone 1 % CREA cream     hydrOXYzine (VISTARIL) 25 MG capsule     hyoscyamine (ANASPAZ/LEVSIN) 0.125 MG tablet     hypromellose (GENTEAL) 0.3 % SOLN     ibuprofen (ADVIL/MOTRIN) 600 MG tablet     Lactase (LACTAID PO)      lactulose 20 GM/30ML SOLN     lidocaine (XYLOCAINE) 2 % jelly     linaclotide (LINZESS) 145 MCG capsule     Magic Mouthwash (FV std formula) lidocaine visc 2% 2.5mL/5mL & maalox/mylanta w/ simeth 2.5mL/5mL & diphenhydrAMINE 5mg/5mL     Magnesium Aspartate HCl 1230 MG PACK     nitroFURantoin macrocrystal-monohydrate (MACROBID) 100 MG capsule     norgestimate-ethinyl estradiol (ORTHO-CYCLEN/SPRINTEC) 0.25-35 MG-MCG tablet     omeprazole (PRILOSEC) 40 MG capsule     OnabotulinumtoxinA (BOTOX IJ)     OnabotulinumtoxinA (BOTOX IJ)     OnabotulinumtoxinA (BOTOX IJ)     OnabotulinumtoxinA (BOTOX IJ)     OnabotulinumtoxinA (BOTOX IJ)     order for DME     order for DME     order for DME     order for DME     oxyCODONE-acetaminophen (PERCOCET) 5-325 MG tablet     oxyCODONE-acetaminophen (PERCOCET) 5-325 MG tablet     sennosides (SENOKOT) 8.6 MG tablet     sucralfate (CARAFATE) 1 GM/10ML suspension     triamcinolone (KENALOG) 0.1 % cream        Allergies   Allergen Reactions     Amoxil [Penicillins] Rash     Dad unsure of reaction.     Bee Venom Anaphylaxis     Contrast Dye Rash     Contrast [place] [facility] [place].; Contrast [facility] [place] [place].  NOTE: this is a contrast media oral with iodine. Premedicate with methylpred standard for IV contrast, request barium contrast for oral contrast.     Kiwi Swelling     Orange Fruit [Citrus] Anaphylaxis     Pineapple Anaphylaxis, Difficulty breathing and Rash     Reglan [Metoclopramide] Other (See Comments)     IV dose only, in ER, rapid heart rate.     Ace Inhibitors      Difficulty in breathing and GI upset     Amitiza [Lubiprostone] Nausea and Vomiting     Amoxicillin-Pot Clavulanate      Latex      Midazolam Unknown     Other reaction(s): Unknown  parent states that when pt takes this medication, she wakes up being very violent .  parent states that when pt takes this medication, she wakes up being very violent .     No Clinical Screening - See Comments   "    Bleech/ chest tightness, itchy throat, swollen tongue, hives     Versed      Coming out of pelvic exam at age of 5, was kicking and screaming when coming out of the versed.     Adhesive Tape Rash     Azithromycin Hives and Rash     Cephalexin Itching and Rash     Itchy mouth  Itchy mouth     Keflex [Cephalexin-Fd&C Yellow #6] Hives         Review of Systems   Constitutional: Positive for chills. Negative for fever.   HENT: Negative for congestion.    Eyes: Negative for redness.   Respiratory: Negative for shortness of breath.    Cardiovascular: Negative for chest pain.   Gastrointestinal: Positive for nausea. Negative for abdominal pain and vomiting.   Genitourinary: Negative for difficulty urinating.   Musculoskeletal: Negative for arthralgias and neck stiffness.        Positive for left foot pain   Skin: Negative for color change.   Neurological: Negative for headaches.   Psychiatric/Behavioral: Negative for confusion.   All other systems reviewed and are negative.      Physical Exam   BP: 118/75  Pulse: 96  Heart Rate: 100  Temp: 98.3  F (36.8  C)  Resp: 17  Height: 160 cm (5' 3\")  Weight: 69.4 kg (153 lb)  SpO2: 98 %      Physical Exam   Constitutional: No distress.   HENT:   Head: Atraumatic.   Mouth/Throat: Oropharynx is clear and moist. No oropharyngeal exudate.   Eyes: Pupils are equal, round, and reactive to light. No scleral icterus.   Cardiovascular: Normal heart sounds and intact distal pulses.   Pulmonary/Chest: Breath sounds normal. No respiratory distress.   Abdominal: Soft. Bowel sounds are normal. There is no tenderness.   Musculoskeletal: She exhibits no edema or tenderness.   Skin: Skin is warm. No rash noted. She is not diaphoretic.       ED Course   10:38 AM  The patient was seen and examined by Fidel Meehan MD in Room ED12.        Procedures        Results for orders placed or performed during the hospital encounter of 03/09/19   XR Ankle Left G/E 3 Views    Narrative    XR ANKLE LT G/E 3 VW "  3/9/2019 1:13 PM    History:  pain. Chronic left ankle pain s/p ankle scope (1/2/19),  redness and swelling along the incision site of the left foot    Comparison: Left ankle radiograph dated 1/2/2019    Findings:   AP, oblique and lateral view of the left ankle. No acute osseous  abnormality, ankle dislocation or subluxation. Mild soft tissue  swelling overlying lateral malleolus, slightly improved from 1/2/2019.        Impression    IMPRESSION:  No acute osseous abnormality.  Mild soft tissue swelling overlying lateral malleolus, slightly  improved from 1/2/2019.       I have personally reviewed the examination and initial interpretation  and I agree with the findings.    RYLEE APODACA MD   CBC with platelets differential   Result Value Ref Range    WBC 6.6 4.0 - 11.0 10e9/L    RBC Count 4.18 3.8 - 5.2 10e12/L    Hemoglobin 12.4 11.7 - 15.7 g/dL    Hematocrit 38.4 35.0 - 47.0 %    MCV 92 78 - 100 fl    MCH 29.7 26.5 - 33.0 pg    MCHC 32.3 31.5 - 36.5 g/dL    RDW 13.2 10.0 - 15.0 %    Platelet Count 211 150 - 450 10e9/L    Diff Method Automated Method     % Neutrophils 69.4 %    % Lymphocytes 24.2 %    % Monocytes 4.5 %    % Eosinophils 1.4 %    % Basophils 0.2 %    % Immature Granulocytes 0.3 %    Nucleated RBCs 0 0 /100    Absolute Neutrophil 4.6 1.6 - 8.3 10e9/L    Absolute Lymphocytes 1.6 0.8 - 5.3 10e9/L    Absolute Monocytes 0.3 0.0 - 1.3 10e9/L    Absolute Eosinophils 0.1 0.0 - 0.7 10e9/L    Absolute Basophils 0.0 0.0 - 0.2 10e9/L    Abs Immature Granulocytes 0.0 0 - 0.4 10e9/L    Absolute Nucleated RBC 0.0    Comprehensive metabolic panel   Result Value Ref Range    Sodium 136 133 - 144 mmol/L    Potassium 3.9 3.4 - 5.3 mmol/L    Chloride 104 94 - 109 mmol/L    Carbon Dioxide 23 20 - 32 mmol/L    Anion Gap 8 3 - 14 mmol/L    Glucose 162 (H) 70 - 99 mg/dL    Urea Nitrogen 11 7 - 30 mg/dL    Creatinine 0.77 0.52 - 1.04 mg/dL    GFR Estimate >90 >60 mL/min/[1.73_m2]    GFR Estimate If Black >90 >60  mL/min/[1.73_m2]    Calcium 7.8 (L) 8.5 - 10.1 mg/dL    Bilirubin Total 0.4 0.2 - 1.3 mg/dL    Albumin 3.3 (L) 3.4 - 5.0 g/dL    Protein Total 7.0 6.8 - 8.8 g/dL    Alkaline Phosphatase 74 40 - 150 U/L    ALT 26 0 - 50 U/L    AST 20 0 - 45 U/L   CRP inflammation   Result Value Ref Range    CRP Inflammation 84.0 (H) 0.0 - 8.0 mg/L   Wound Culture Aerobic Bacterial   Result Value Ref Range    Specimen Description Left Foot Wound     Special Requests Specimen collected in eSwab transport (white cap)     Culture Micro PENDING    Gram stain   Result Value Ref Range    Specimen Description Left Foot Wound     Special Requests Specimen collected in eSwab transport (white cap)     Gram Stain Many  Gram positive cocci   (A)     Gram Stain Many  WBC'S seen  predominantly PMN's        *Note: Due to a large number of results and/or encounters for the requested time period, some results have not been displayed. A complete set of results can be found in Results Review.            Labs Ordered and Resulted from Time of ED Arrival Up to the Time of Departure from the ED   COMPREHENSIVE METABOLIC PANEL - Abnormal; Notable for the following components:       Result Value    Glucose 162 (*)     Calcium 7.8 (*)     Albumin 3.3 (*)     All other components within normal limits   CRP INFLAMMATION - Abnormal; Notable for the following components:    CRP Inflammation 84.0 (*)     All other components within normal limits   GRAM STAIN - Abnormal; Notable for the following components:    Gram Stain   (*)     Value: Many  Gram positive cocci      All other components within normal limits   CBC WITH PLATELETS DIFFERENTIAL   WOUND CULTURE AEROBIC BACTERIAL     Medications   0.9% sodium chloride BOLUS (0 mLs Intravenous Stopped 3/9/19 1224)     Followed by   sodium chloride 0.9% infusion (1,000 mLs Intravenous New Bag 3/9/19 1228)   ondansetron (ZOFRAN) injection 4 mg (4 mg Intravenous Given 3/9/19 1122)   clindamycin (CLEOCIN) infusion 900 mg  (not administered)   oxyCODONE (ROXICODONE) tablet 5 mg (5 mg Oral Given 3/9/19 0025)       Consults  Orthopedics: Responded (03/09/19 0061)            Assessments & Plan (with Medical Decision Making)   23-year-old female who is approximately 10 weeks status post left ankle surgery who presents with a increasing pain, redness and swelling around the surgical incision site over the past several days despite oral antibiotic use.  Exam does confirm erythema beyond previously circled area of redness as well as some scant purulent drainage is noted in picture above.  Laboratories were obtained including CBC that was normal and comprehensive metabolic panel revealing elevated glucose 162 consistent with stress response.  CRP was elevated at 84 consistent with inflammation/infection.  Gram stain revealed many white blood cells and gram positive cocci.  IV was established and patient was given clindamycin.  The case was discussed at length with orthopedics who was in to see the patient, please see separate note.  Ultimately, the case was also discussed with the hospitalist at [facility] who is agreed to accept the patient is an admission to the hospital for ongoing IV antibiotics and other treatment as deemed appropriate.    I have reviewed the nursing notes.    I have reviewed the findings, diagnosis, plan and need for follow up with the patient.       Medication List      ASK your doctor about these medications    BOTOX IJ  Ask about: Should I take this medication?     clindamycin 300 MG capsule  Commonly known as:  CLEOCIN  300 mg, Oral, 3 TIMES DAILY  Ask about: Should I take this medication?     * sulfamethoxazole-trimethoprim 800-160 MG tablet  Commonly known as:  BACTRIM DS/SEPTRA DS  1 tablet, Oral, 2 TIMES DAILY  Ask about: Should I take this medication?     * sulfamethoxazole-trimethoprim 800-160 MG tablet  Commonly known as:  BACTRIM DS/SEPTRA DS  1 tablet, Oral, 2 TIMES DAILY  Ask about: Should I take  this medication?     triamcinolone 40 MG/ML injection  Commonly known as:  KENALOG-40  40 mg, INTRA-ARTICULAR, ONCE  Ask about: Should I take this medication?         * This list has 2 medication(s) that are the same as other medications prescribed for you. Read the directions carefully, and ask your doctor or other care provider to review them with you.                Final diagnoses:   Cellulitis and abscess of left leg     INelson, am serving as a trained medical scribe to document services personally performed by Fidel Meehan MD, based on the provider's statements to me.   IFidel MD, was physically present and have reviewed and verified the accuracy of this note documented by Nelson Ravi.    3/9/2019   [facility], [place], EMERGENCY DEPARTMENT     Hussein Meehan MD  03/09/19 1755

## 2019-03-10 LAB
CRP SERPL-MCNC: 51.8 MG/L (ref 0–8)
ERYTHROCYTE [DISTWIDTH] IN BLOOD BY AUTOMATED COUNT: 13.1 % (ref 10–15)
HCT VFR BLD AUTO: 35.9 % (ref 35–47)
HGB BLD-MCNC: 11.2 G/DL (ref 11.7–15.7)
HGB BLD-MCNC: 11.4 G/DL (ref 11.7–15.7)
MCH RBC QN AUTO: 28.9 PG (ref 26.5–33)
MCHC RBC AUTO-ENTMCNC: 31.8 G/DL (ref 31.5–36.5)
MCV RBC AUTO: 91 FL (ref 78–100)
PLATELET # BLD AUTO: 185 10E9/L (ref 150–450)
RBC # BLD AUTO: 3.94 10E12/L (ref 3.8–5.2)
WBC # BLD AUTO: 5.3 10E9/L (ref 4–11)

## 2019-03-10 PROCEDURE — 36415 COLL VENOUS BLD VENIPUNCTURE: CPT | Performed by: INTERNAL MEDICINE

## 2019-03-10 PROCEDURE — 96376 TX/PRO/DX INJ SAME DRUG ADON: CPT

## 2019-03-10 PROCEDURE — 12000001 ZZH R&B MED SURG/OB UMMC

## 2019-03-10 PROCEDURE — 25000128 H RX IP 250 OP 636: Performed by: INTERNAL MEDICINE

## 2019-03-10 PROCEDURE — 25000125 ZZHC RX 250: Performed by: INTERNAL MEDICINE

## 2019-03-10 PROCEDURE — 86140 C-REACTIVE PROTEIN: CPT | Performed by: INTERNAL MEDICINE

## 2019-03-10 PROCEDURE — 25800030 ZZH RX IP 258 OP 636: Performed by: EMERGENCY MEDICINE

## 2019-03-10 PROCEDURE — 25000132 ZZH RX MED GY IP 250 OP 250 PS 637: Performed by: INTERNAL MEDICINE

## 2019-03-10 PROCEDURE — 25800030 ZZH RX IP 258 OP 636: Performed by: INTERNAL MEDICINE

## 2019-03-10 PROCEDURE — 85027 COMPLETE CBC AUTOMATED: CPT | Performed by: INTERNAL MEDICINE

## 2019-03-10 PROCEDURE — G0378 HOSPITAL OBSERVATION PER HR: HCPCS

## 2019-03-10 PROCEDURE — 85018 HEMOGLOBIN: CPT | Performed by: INTERNAL MEDICINE

## 2019-03-10 PROCEDURE — 96375 TX/PRO/DX INJ NEW DRUG ADDON: CPT

## 2019-03-10 PROCEDURE — 25000125 ZZHC RX 250: Performed by: EMERGENCY MEDICINE

## 2019-03-10 RX ORDER — ACETAMINOPHEN 500 MG
1000 TABLET ORAL EVERY 8 HOURS
Status: DISCONTINUED | OUTPATIENT
Start: 2019-03-10 | End: 2019-03-16 | Stop reason: HOSPADM

## 2019-03-10 RX ORDER — SENNOSIDES 8.6 MG
1 TABLET ORAL DAILY
Status: DISCONTINUED | OUTPATIENT
Start: 2019-03-10 | End: 2019-03-12

## 2019-03-10 RX ORDER — CEFAZOLIN SODIUM 1 G/50ML
1250 SOLUTION INTRAVENOUS EVERY 12 HOURS
Status: DISCONTINUED | OUTPATIENT
Start: 2019-03-10 | End: 2019-03-14

## 2019-03-10 RX ORDER — SUCRALFATE ORAL 1 G/10ML
1 SUSPENSION ORAL 4 TIMES DAILY
Status: DISCONTINUED | OUTPATIENT
Start: 2019-03-10 | End: 2019-03-16 | Stop reason: HOSPADM

## 2019-03-10 RX ORDER — GUANFACINE 1 MG/1
3 TABLET ORAL
Status: DISCONTINUED | OUTPATIENT
Start: 2019-03-10 | End: 2019-03-16 | Stop reason: HOSPADM

## 2019-03-10 RX ORDER — NORGESTIMATE AND ETHINYL ESTRADIOL 0.25-0.035
1 KIT ORAL DAILY
Status: DISCONTINUED | OUTPATIENT
Start: 2019-03-10 | End: 2019-03-16 | Stop reason: HOSPADM

## 2019-03-10 RX ORDER — DICYCLOMINE HCL 20 MG
20 TABLET ORAL 4 TIMES DAILY PRN
Status: DISCONTINUED | OUTPATIENT
Start: 2019-03-10 | End: 2019-03-16 | Stop reason: HOSPADM

## 2019-03-10 RX ORDER — DIPHENHYDRAMINE HCL 25 MG
25-50 CAPSULE ORAL EVERY 6 HOURS PRN
Status: DISCONTINUED | OUTPATIENT
Start: 2019-03-10 | End: 2019-03-14

## 2019-03-10 RX ORDER — NITROFURANTOIN 25; 75 MG/1; MG/1
100 CAPSULE ORAL AT BEDTIME
Status: DISCONTINUED | OUTPATIENT
Start: 2019-03-10 | End: 2019-03-16 | Stop reason: HOSPADM

## 2019-03-10 RX ORDER — CHOLECALCIFEROL (VITAMIN D3) 125 MCG
3000 CAPSULE ORAL
Status: DISCONTINUED | OUTPATIENT
Start: 2019-03-10 | End: 2019-03-14

## 2019-03-10 RX ORDER — POLYETHYLENE GLYCOL 3350 17 G/17G
17 POWDER, FOR SOLUTION ORAL 3 TIMES DAILY
Status: DISCONTINUED | OUTPATIENT
Start: 2019-03-10 | End: 2019-03-16 | Stop reason: HOSPADM

## 2019-03-10 RX ORDER — ALBUTEROL SULFATE 90 UG/1
2 AEROSOL, METERED RESPIRATORY (INHALATION) EVERY 4 HOURS PRN
Status: DISCONTINUED | OUTPATIENT
Start: 2019-03-10 | End: 2019-03-16 | Stop reason: HOSPADM

## 2019-03-10 RX ORDER — OXYCODONE HYDROCHLORIDE 5 MG/1
5 TABLET ORAL
Status: DISCONTINUED | OUTPATIENT
Start: 2019-03-10 | End: 2019-03-12

## 2019-03-10 RX ORDER — HYDROXYZINE HYDROCHLORIDE 25 MG/1
25-50 TABLET, FILM COATED ORAL EVERY 6 HOURS PRN
Status: DISCONTINUED | OUTPATIENT
Start: 2019-03-10 | End: 2019-03-14

## 2019-03-10 RX ORDER — SODIUM CHLORIDE 9 MG/ML
1000 INJECTION, SOLUTION INTRAVENOUS CONTINUOUS
Status: DISCONTINUED | OUTPATIENT
Start: 2019-03-10 | End: 2019-03-11

## 2019-03-10 RX ADMIN — MINERAL OIL AND WHITE PETROLATUM: 150; 830 OINTMENT OPHTHALMIC at 20:54

## 2019-03-10 RX ADMIN — ONDANSETRON 4 MG: 2 INJECTION INTRAMUSCULAR; INTRAVENOUS at 16:12

## 2019-03-10 RX ADMIN — OMEPRAZOLE 40 MG: 20 CAPSULE, DELAYED RELEASE ORAL at 08:13

## 2019-03-10 RX ADMIN — OXYCODONE HYDROCHLORIDE 5 MG: 5 TABLET ORAL at 08:13

## 2019-03-10 RX ADMIN — OXYCODONE HYDROCHLORIDE 5 MG: 5 TABLET ORAL at 12:22

## 2019-03-10 RX ADMIN — AMITRIPTYLINE HYDROCHLORIDE 50 MG: 50 TABLET, FILM COATED ORAL at 20:44

## 2019-03-10 RX ADMIN — ALBUTEROL SULFATE 2 PUFF: 90 AEROSOL, METERED RESPIRATORY (INHALATION) at 20:42

## 2019-03-10 RX ADMIN — ACETAMINOPHEN 650 MG: 325 TABLET, FILM COATED ORAL at 12:22

## 2019-03-10 RX ADMIN — COLCHICINE 0.6 MG: 0.6 TABLET, FILM COATED ORAL at 08:13

## 2019-03-10 RX ADMIN — VANCOMYCIN HYDROCHLORIDE 1250 MG: 10 INJECTION, POWDER, LYOPHILIZED, FOR SOLUTION INTRAVENOUS at 15:50

## 2019-03-10 RX ADMIN — ACETAMINOPHEN 1000 MG: 500 TABLET ORAL at 18:53

## 2019-03-10 RX ADMIN — ACETAMINOPHEN 650 MG: 325 TABLET, FILM COATED ORAL at 06:46

## 2019-03-10 RX ADMIN — GUANFACINE 3 MG: 2 TABLET ORAL at 20:47

## 2019-03-10 RX ADMIN — COLCHICINE 0.6 MG: 0.6 TABLET, FILM COATED ORAL at 20:45

## 2019-03-10 RX ADMIN — ONDANSETRON 4 MG: 2 INJECTION INTRAMUSCULAR; INTRAVENOUS at 06:49

## 2019-03-10 RX ADMIN — CLINDAMYCIN IN 5 PERCENT DEXTROSE 900 MG: 18 INJECTION, SOLUTION INTRAVENOUS at 06:46

## 2019-03-10 RX ADMIN — OXYCODONE HYDROCHLORIDE 5 MG: 5 TABLET ORAL at 18:02

## 2019-03-10 RX ADMIN — OXYCODONE HYDROCHLORIDE 5 MG: 5 TABLET ORAL at 03:29

## 2019-03-10 RX ADMIN — SODIUM CHLORIDE 1000 ML: 9 INJECTION, SOLUTION INTRAVENOUS at 12:22

## 2019-03-10 RX ADMIN — SODIUM CHLORIDE 1000 ML: 9 INJECTION, SOLUTION INTRAVENOUS at 03:32

## 2019-03-10 RX ADMIN — OXYCODONE HYDROCHLORIDE 5 MG: 5 TABLET ORAL at 20:58

## 2019-03-10 RX ADMIN — DIPHENHYDRAMINE HYDROCHLORIDE 25 MG: 25 CAPSULE ORAL at 03:29

## 2019-03-10 ASSESSMENT — ACTIVITIES OF DAILY LIVING (ADL)
BATHING: 0-->INDEPENDENT
WHICH_OF_THE_ABOVE_FUNCTIONAL_RISKS_HAD_A_RECENT_ONSET_OR_CHANGE?: TRANSFERRING;AMBULATION
RETIRED_EATING: 0-->INDEPENDENT
AMBULATION: 1-->ASSISTIVE EQUIPMENT
DRESS: 0-->INDEPENDENT
SWALLOWING: 0-->SWALLOWS FOODS/LIQUIDS WITHOUT DIFFICULTY
RETIRED_COMMUNICATION: 0-->UNDERSTANDS/COMMUNICATES WITHOUT DIFFICULTY
COGNITION: 0 - NO COGNITION ISSUES REPORTED
FALL_HISTORY_WITHIN_LAST_SIX_MONTHS: NO
TOILETING: 0-->INDEPENDENT
TRANSFERRING: 1-->ASSISTIVE EQUIPMENT
ADLS_ACUITY_SCORE: 16

## 2019-03-10 NOTE — PHARMACY-VANCOMYCIN DOSING SERVICE
Pharmacy Vancomycin Initial Note  Date of Service March 10, 2019  Patient's  1995  23 year old, female    Indication: Skin and Soft Tissue Infection    Current estimated CrCl = Estimated Creatinine Clearance: 108.8 mL/min (based on SCr of 0.77 mg/dL).    Creatinine for last 3 days  3/9/2019: 11:20 AM Creatinine 0.77 mg/dL    Recent Vancomycin Level(s) for last 3 days  No results found for requested labs within last 72 hours.      Vancomycin IV Administrations (past 72 hours)      No vancomycin orders with administrations in past 72 hours.                Nephrotoxins and other renal medications (From now, onward)    Start     Dose/Rate Route Frequency Ordered Stop    03/10/19 1500  vancomycin (VANCOCIN) 1,250 mg in sodium chloride 0.9 % 250 mL intermittent infusion      1,250 mg  over 90 Minutes Intravenous EVERY 12 HOURS 03/10/19 1447            Contrast Orders - past 72 hours (72h ago, onward)    None                Plan:  1.  Start vancomycin  1250 mg IV q12h.   2.  Goal Trough Level: 10-15 mg/L   3.  Pharmacy will check trough levels as appropriate in 1-3 Days.    4. Serum creatinine levels will be ordered a minimum of twice weekly.      Diana Hallman

## 2019-03-10 NOTE — PLAN OF CARE
Pt arrived from [place] [facility] ED at 1600 and was oriented to room and call light  VS: VSS   O2: >90% on RA   Output: No voiding issues per pt report   Last BM: 3/7   Activity: WBAT; independent with crutches   Up for meals? Yes   Skin: Wound L foot   Pain: 5mg oxycodone and tylenol administered   CMS: Intact   Dressing: CDI   Diet: Regular; pt will be NPO at midnight pending wound check in AM   LDA: PIV infusing   Equipment: IV pole, crutches   Plan: TBD   Additional Info: Observation goals: VSS, pain control, abx plan

## 2019-03-10 NOTE — PROGRESS NOTES
RN called about concern for heavy periods. Pt appearing pale. Vitals stable.   B/P: 105/62, T: 96.9, P: 88, R: 16  Temp (24hrs), Av.3  F (36.3  C), Min:96.7  F (35.9  C), Max:97.7  F (36.5  C)    Repeat Hgb ordered: 11.2- stable. In am 11.4.   Ct to monitor.   Consider Ob/gyn consult in am for heavy periods. Defer to Primary team.     Patient changed to inpatient, per Dr Corrales note's plan. Per RN.     Kris Boyce MD  House Physician  Pager: [phone]    3/10/2019

## 2019-03-10 NOTE — PLAN OF CARE
0815:  Surgery rounder here,  inspecting foot.  Palpation around wound produced serous fluid.  Patient c/o  increased pain as foot palpated, and fluid removed.  She had been medicated for pain prior to this being done.  Afebrile. VSS.     1030:  Patient rests on bed, with LLE elevated on pillow.  She is able to ambulate to the bathroom, using crutches, with assistance of staff with her IV.  She is NWB LLE.  She reports that she has been using crutches for 10 weeks.     1430:  Dr Corrales has seen patient.  Family arrived to visit, with Mother interested in being able to talk with MD.  Dr Corrales reviewed information with them, and went through patient's list of home meds with Mother and Patient. Patient feels that the Oxycodone is no longer doing enough for pain control.  MD aware, and has changed the Oxycodone to [id number] PRN from [id number].

## 2019-03-10 NOTE — PROGRESS NOTES
A/Ox's 4. Pt rated pain as tolerable. Oxycodone given for pain control. Benadryl given for pt reported hives. Dressing scan dry drainage.  Redness slightly beyond marked borders. CMS intact. Tolerated regular diet. Denied any nausea, CP, SOB, lightheadedness or dizziness. Voiding without pain or difficulty. Passing flatus. Up with SBA. IV Fluid infusing. Resting in bed at this time with call light in reach. Able to make needs known. Continue to monitor.     [id number]   -diagnostic tests and consults completed and resulted- not met; wound culture pending   -vital signs normal or at patient baseline- met   -adequate pain control on oral analgesics- met through oral pain meds  -tolerating oral antibiotics or has plans for home infusion setup- not met; switch to PO abx 3/10/19    [id number]   -diagnostic tests and consults completed and resulted- not met; wound culture pending   -vital signs normal or at patient baseline- met   -adequate pain control on oral analgesics- met through oral pain meds  -tolerating oral antibiotics or has plans for home infusion setup- not met; switch to PO abx 3/10/19    [id number]   -diagnostic tests and consults completed and resulted- not met; wound culture pending   -vital signs normal or at patient baseline- met   -adequate pain control on oral analgesics- met through oral pain meds  -tolerating oral antibiotics or has plans for home infusion setup- not met; switch to PO abx 3/10/19    [id number]   -diagnostic tests and consults completed and resulted- not met; wound culture pending   -vital signs normal or at patient baseline- met   -adequate pain control on oral analgesics- met through oral pain meds  -tolerating oral antibiotics or has plans for home infusion setup- not met; switch to PO abx 3/10/19

## 2019-03-10 NOTE — H&P
Admitted:     03/09/2019      ATTESTATION TO ADMISSION AND HISTORY AND PHYSICAL:  Performed by Dr. Reza Rocha.      HISTORY OF PRESENT ILLNESS:  A 23-year-old female with complex medical history as documented who underwent left ankle surgery on 01/2/19 with admission presently to the medical service through the Emergency Department where she presented with progressive soft tissue infection involving the left lower extremity extending out from the surgical wound.      Record review indicates patient underwent a left ankle arthroscopy with synovectomy, stressing of vancomycin under anesthesia, open anterior talofibular ligament repair and debridement of a sinus tarsi left lower extremity, 01/02/2019, by Dr. Andres Johnson from Podiatry.  Indication, chronic left ankle pain with sinus tarsi pain, left foot.  The patient apparently did reasonably well until 03/06/2019, when she noted increased swelling and pain involving the left foot/ankle.  Subsequent progressive erythema and increased warmth with wound drainage, prompting evaluation by her podiatrist on 03/07/2019.  Exam demonstrated fibrous tissue along the lateral incision with erythema/edema.  Placed on oral clindamycin on which patient had been compliant.  A progressive involvement, prompting presentation to the Emergency Department, 03/09/2019.  Noted progression in erythema, swelling and pain over the dorsum of the left foot and ankle.  ER evaluation demonstrated the patient to be tachycardic at 100, blood pressure 118/75, temperature 98.3 degrees.  O2 sat 98% room air.      X-ray of the left ankle demonstrated no acute osseous abnormality.  Mild soft tissue swelling overlying the lateral malleolus, slightly improved from 01/02/2019.      White count 6600, hemoglobin of 12.4, MCV 92, platelet count 211,000.  Basic metabolic profile:  Normal electrolytes, glucose 162, BUN 11, creatinine 0.77.  Normal liver profile.  CRP elevated at 84.  A culture from the  left wound drainage growing gram-positive cocci on Gram stain.  Many white blood cells.  The patient seen in consultation by [facility].  Impression of cellulitis, dorsal left foot and ankle extending out from the surgical wound.  No concern based on exam for deep soft tissue abscess requiring surgical intervention, the morning of 03/09/2019, as well as today.  Admitted to the medical service on IV clindamycin 900 mg q.8 h.  No appreciable improvement in the interim in the degree of erythema, pain or swelling.  The patient has remained afebrile.  A downward trend in CRP from 84-51.8.  Normal white count 5300.  Chills prior to presentation.  No thermometer to document temperature.  Chronic abdominal pain with some increase in chronic nausea/reflux.  Last bowel movement on 03/06 or 03/07 (pattern of 1-2 times weekly).      PAST MEDICAL, PAST SURGICAL HISTORY, ALLERGIES, MEDICATIONS:  Reviewed.        ALLERGIES:  Significant allergy history as documented.      FAMILY HISTORY:  Remarkable for depression, hypertension; alcohol/drug use.      HABITS:  Nonsmoker.  One drink per week.  No drug use.        SOCIAL HISTORY:  As per documentation.        REVIEW OF SYSTEMS:  No change in chronic headaches attributed to migraine, lightheaded in bed.  No visual change.  Denies chest discomfort.  Did note dyspnea as characterized as difficulty taking a deep inspiratory effort on 03/09.  Quiescent presently.  No pleuritic pain.  No cough.  Nausea as above without emesis.  Ongoing acid reflux.  Off omeprazole has resulted chest discomfort (resolving with drug cessation).  No change in chronic abdominal pain.  Constipation as above.  No voiding complaints.  Chronic pain involving the neck, knees and hands.  Noted a pruritic eruption over the face and anterior chest earlier today for which she was given Benadryl 25 mg.  Usually requires a higher dose at home for urticaria, I believe.  No new focal neurologic  complaint.      OBJECTIVE:   GENERAL:  Healthy-appearing young adult female sitting upright in bed in no distress.  Alert and oriented.   VITAL SIGNS:  Blood pressure 112/76, heart rate 80s and regular, respirations nonlabored, O2 sat 98% room air.  Temperature normal.   HEENT:  Extraocular movements full.  No nystagmus.  Pupils equal and reacting symmetrically.  Sclerae nonicteric.  Fundi deferred.  Oropharynx clear.  Dentition adequate repair.  Mucosal membranes are moist.  Carotid upstroke brisk.  No bruits.  There is no thyromegaly or lymphadenopathy.   SKIN:  Demonstrates no appreciable rash over the chest, except for a few punctate erythematous lesions.  No urticaria.  Over the dorsum of the left foot there is extensive erythema, soft tissue swelling and increased warmth extending medial to the demarcating line placed prior to presentation.  Centrally located is a superficial whitish ulcerated area measuring approximately 3 cm.  No active drainage.   CHEST:  Clear lung fields.   CARDIAC:  Regular without audible gallop or murmur.  No click, no jugular venous distention.   BREASTS:  Deferred:     ABDOMEN:  Nondistended, soft, nontender, without palpable organomegaly or mass.  Bowel sounds are present.  No epigastric or iliac bruits.  Distal lower extremities are well perfused, no cyanosis or clubbing.  No edema, no posterior calf or thigh tenderness/induration to suggest DVT.   PELVIC/RECTAL:  Deferred.   NEUROLOGIC:  Grossly nonfocal.        LAB DATA:  2019, includes a complete metabolic profile, CRP and CBC as above.  Repeat CRP today 51.8.  Normal white count as above at 5.3 with hemoglobin 11.4.      ASSESSMENT:  A 23-year-old female admitted with the followin.  Cellulitis, left lower extremity involving the foot and ankle with interval progression in degree of erythema.  No subjective improvement in terms of degree of erythema, pain or swelling despite a drop in CRP from the 80 to 50 range.   Remains afebrile.  Clinically not toxic.  Moderate Staph aureus on wound culture.  Failed outpatient clindamycin.   2.  Status post left ankle arthroscopy with synovectomy, open anterior talofibular ligament repair and debridement of sinus tarsi 2019.   3.  Chronic pain.  Clinically distressed.   4.  Fibromyalgia.   5.  Behcet's disease.   6.  Irritable bowel syndrome.   7.  Constipation.  Agrees to MiraLax.  Does not want to resume Linzess.   8.  Gastroesophageal reflux disease, symptomatic with ongoing symptomatic compromise off omeprazole.  Carafate liquid helpful prior to presentation.   9.  Seizure disorder, not on treatment, quiescent.   10.  Gastroparesis per record.   11.  Migraine headaches, intermittent.  Clinically quiescent at present.   12.  Tourette's per record.   13.  Chronic abdominal pain and nausea.      PLAN:   1.  Admit to Medicine.   2.  Consider antibiotic change to IV vancomycin with progressive erythema, lack of improvement in pain and swelling.  We will review with Infectious Disease.   3.  Serial followup by Orthopedics to determine whether surgical incision and drainage or debridement warranted.  Followup exam planned for the morning of .   4.  Review/resume prior to admission meds as appropriate.  Continue oxycodone for pain.   5.  Add Carafate for reflux.   6.  Benadryl p.r.n. pruritus/skin rash.   7.  Consider formal Infectious Disease consultation.   8.  MiraLax for constipation.   9.  Trend labs.   10.  Close clinical monitoring.         ADRIANNA GAN MD             D: 03/10/2019   T: 03/10/2019   MT: ROGE      Name:     LUCINA BAH   MRN:      [id number]        Account:      [id number]   :      1995        Admitted:     2019                   Document: [id number]

## 2019-03-10 NOTE — PROVIDER NOTIFICATION
Notified moonlighter as follows:  Pt reported being on period for 6 wks, going through 5-6 pads/day, switched BC recently to resolve cramps and irregular period. BP stable. Pt appears pale and reports a headache/LH. Just FYI.  Thanks Maeve [phone]

## 2019-03-10 NOTE — PLAN OF CARE
Pt AxOx4. VSS, but has reported some lightheadedness. Up SBA with crutches. Voiding in the bathroom. Last BM on 3/7/19, bowel sounds active in all quadrants, has been experiencing nausea and was given Zofran. Refused Miralax and stool softener because of the nausea. Poor appetite, but was able to eat some food brought in by her boyfriend. Lung sounds clear in all fields. Started first dose of Vancomycin this afternoon and pt reported burning, so slowed the rate down to 100mL/hr. PIV infusing in R arm. Pt stated burning has not occurred since. Managing pain with scheduled Tylenol and Oxy. Did the second L foot soak. Pt reported L foot looked more swollen. Redness outlined and has exceeded the original marking. New dressing applied-CDI. Going to be NPO after midnight pending wound check in the AM. Will continue to monitor.

## 2019-03-10 NOTE — PROVIDER NOTIFICATION
"Ino notified r/t pt's request for benadryl. Pt states she's getting \"hives related to something she must have eaten\". Upon assessment, LS clear. Redness on chest noted.   "

## 2019-03-10 NOTE — PLAN OF CARE
"  VS: VSS; alert and oriented x4   O2: >95% on RA   Output: Voiding spontaneously in toilet   Last BM: 3/7/19 per pt report    Activity: Up w/ crutches and SBA to bathroom; tolerating well    Skin: Intact w/ exception of L foot wound   Edema present; marked erythema within borders   Pain: Foot pain controlled w/ ice, prn tylenol, and prn oxy    CMS: Intact; refused L pedal pulse assessment r/t pain    Dressing: Primapore to L foot replaced this shift  Scant purulent drainage noted   Diet: Regular; citrus fruit allergies    LDA: R PIV infusing NS @ 125/hr    Equipment: IV pole   Plan: IV abx; switch to oral abx 3/10/19   Potential discharge 3/10/19 (obs status)   Additional Info: Pt calm and cooperative with nursing cares. Benadryl given r/t itching/\"hives\". See previous note. Able to make needs known. No anxiety noted this shift.      OBS goals assessed q4h:     -diagnostic tests and consults completed and resulted- not met; wound culture pending   -vital signs normal or at patient baseline- met   -adequate pain control on oral analgesics- met through oral pain meds  -tolerating oral antibiotics or has plans for home infusion setup- not met; switch to PO abx 3/10/19         "

## 2019-03-10 NOTE — PROGRESS NOTES
"Orthopaedic Surgery Progress Note   March 10, 2019    Subjective: No acute events overnight. Pain controlled with medication. Remains afebrile. NPO pending evaluation this AM. Erythema is stable, although not improved.     Objective: /73 (BP Location: Left arm)   Pulse 95   Temp 97.6  F (36.4  C) (Oral)   Resp 16   Ht 1.6 m (5' 3\")   Wt 74.3 kg (163 lb 14.4 oz)   LMP 03/09/2019   SpO2 100%   BMI 29.03 kg/m      General: NAD, alert and oriented, cooperative with exam.   Cardio: RRR, extremities wwp.   Respiratory: Non-labored breathing.  MSK: LLE: Toes wwp, DP 2+, bcr in all toes. Erythema appears stable as compared to yesterday. Stable lateral swelling. Tolerates passive ankle ROM including dorsi and plantarflexion, eversion and inversion. +EHL/FHL, patient is hesitant to demonstrate TA and GSC but is able to with encouragement. SILT SP/DP/Sa/Cat/T. Dressing with serous drainage. Fibrinous tissue at the wound dehiscence but no purulence. Further serous fluid expressed from the wound this AM. No focal fluctuance. Non-tender to palpation of the medial tibiotalar joint line.     Labs:   WBC 5.3  Hgb 11.4  Plts 185  CRP 84 -> 51.8  ED Wound Cx: +GPCs. Cultures in process.     Assessment and Plan: Samara Oropeza is a 23 year old female with PMH including anxiety, Behcet's disease, fibromyalgia, GERD, migraines, seizure disorder, Tourette's and s/p L ankle arthroscopy and ATFL repair on 1/2 by Dr. Johnson presenting with increasing erythema, drainage and pain surrounding her surgical incision, found to have cellulitis. Based on current physical exam, no concern for deep soft tissue abscess requiring surgical intervention.      Internal Medicine Primary.  Plan for OR: No surgical indication at this time. Will monitor clinical response to IV antibiotics.   Activity: Up with assist until independent.  Weight bearing status: WBAT with CAM boot as instructed by her surgeon.   Pain management: Per primary. "    Antibiotics/Tetanus: Per primary. Patient was on PO clindamycin prior to presentation. IV Clindamycin overnight without significant improvement in erythema, although remains afebrile and CRP downtrending. May need to consider other coverage given lack of improvement in erythema.   Diet: Per primary. Okay for a diet today from Orthopedic Surgery perspective. NPO after midnight pending wound check tomorrow AM.    Anticoagulation/DVT prophylaxis: Per primary.    Imaging: No further imaging needed at this time.   Labs: Monitor inflammatory markers.  Bracing/Splinting: CAM boot as instructed by her surgeon.    Dressings: Keep clean, dry and intact - recommend covering wound with non-adhesive dressing such as adaptic, gauze and ACE bandage. Will start BID dilute betadine soaks today - order placed.   Elevation: Elevate LLE on pillows to keep above the level of the heart as much as possible.   Cultures: Pending, follow culture results closely.    Follow-up: Clinic with Dr. Johnson within 1 week.   Disposition: Pending clinical response - anticipate discharge to home in 1-2 days.       Miley Esteves MD  Orthopaedic Surgery Resident, PGY-4  Pager: [phone]     For questions about this patient during weekday business hours, please attempt to contact me at my pager prior to contacting the Orthopaedic Surgery resident on call. On the weekends and overnight, please page the Orthopaedic Surgery resident on call. Thank you!

## 2019-03-11 LAB
ANION GAP SERPL CALCULATED.3IONS-SCNC: 6 MMOL/L (ref 3–14)
BUN SERPL-MCNC: 7 MG/DL (ref 7–30)
CALCIUM SERPL-MCNC: 8.2 MG/DL (ref 8.5–10.1)
CHLORIDE SERPL-SCNC: 109 MMOL/L (ref 94–109)
CO2 SERPL-SCNC: 24 MMOL/L (ref 20–32)
CREAT SERPL-MCNC: 0.7 MG/DL (ref 0.52–1.04)
CRP SERPL-MCNC: 29.4 MG/L (ref 0–8)
ERYTHROCYTE [DISTWIDTH] IN BLOOD BY AUTOMATED COUNT: 13 % (ref 10–15)
GFR SERPL CREATININE-BSD FRML MDRD: >90 ML/MIN/{1.73_M2}
GLUCOSE SERPL-MCNC: 103 MG/DL (ref 70–99)
HCT VFR BLD AUTO: 33.6 % (ref 35–47)
HGB BLD-MCNC: 10.8 G/DL (ref 11.7–15.7)
MCH RBC QN AUTO: 28.8 PG (ref 26.5–33)
MCHC RBC AUTO-ENTMCNC: 32.1 G/DL (ref 31.5–36.5)
MCV RBC AUTO: 90 FL (ref 78–100)
PLATELET # BLD AUTO: 200 10E9/L (ref 150–450)
POTASSIUM SERPL-SCNC: 4 MMOL/L (ref 3.4–5.3)
RBC # BLD AUTO: 3.75 10E12/L (ref 3.8–5.2)
SODIUM SERPL-SCNC: 139 MMOL/L (ref 133–144)
WBC # BLD AUTO: 4.3 10E9/L (ref 4–11)

## 2019-03-11 PROCEDURE — 85027 COMPLETE CBC AUTOMATED: CPT | Performed by: INTERNAL MEDICINE

## 2019-03-11 PROCEDURE — 99222 1ST HOSP IP/OBS MODERATE 55: CPT | Performed by: INTERNAL MEDICINE

## 2019-03-11 PROCEDURE — 12000001 ZZH R&B MED SURG/OB UMMC

## 2019-03-11 PROCEDURE — 25000128 H RX IP 250 OP 636: Performed by: INTERNAL MEDICINE

## 2019-03-11 PROCEDURE — 25800030 ZZH RX IP 258 OP 636: Performed by: INTERNAL MEDICINE

## 2019-03-11 PROCEDURE — 99232 SBSQ HOSP IP/OBS MODERATE 35: CPT | Performed by: INTERNAL MEDICINE

## 2019-03-11 PROCEDURE — 25000125 ZZHC RX 250: Performed by: INTERNAL MEDICINE

## 2019-03-11 PROCEDURE — 36415 COLL VENOUS BLD VENIPUNCTURE: CPT | Performed by: INTERNAL MEDICINE

## 2019-03-11 PROCEDURE — 25000132 ZZH RX MED GY IP 250 OP 250 PS 637: Performed by: INTERNAL MEDICINE

## 2019-03-11 PROCEDURE — 80048 BASIC METABOLIC PNL TOTAL CA: CPT | Performed by: INTERNAL MEDICINE

## 2019-03-11 PROCEDURE — 86140 C-REACTIVE PROTEIN: CPT | Performed by: INTERNAL MEDICINE

## 2019-03-11 PROCEDURE — 82565 ASSAY OF CREATININE: CPT | Performed by: INTERNAL MEDICINE

## 2019-03-11 PROCEDURE — G0463 HOSPITAL OUTPT CLINIC VISIT: HCPCS

## 2019-03-11 PROCEDURE — 99207 ZZC CONSULT E&M CHANGED TO INITIAL LEVEL: CPT | Performed by: INTERNAL MEDICINE

## 2019-03-11 RX ORDER — SENNOSIDES 8.6 MG
8.6 TABLET ORAL 2 TIMES DAILY PRN
Status: DISCONTINUED | OUTPATIENT
Start: 2019-03-11 | End: 2019-03-14

## 2019-03-11 RX ADMIN — GUANFACINE 3 MG: 2 TABLET ORAL at 09:33

## 2019-03-11 RX ADMIN — COLCHICINE 0.6 MG: 0.6 TABLET, FILM COATED ORAL at 09:35

## 2019-03-11 RX ADMIN — POLYETHYLENE GLYCOL 3350 17 G: 17 POWDER, FOR SOLUTION ORAL at 20:35

## 2019-03-11 RX ADMIN — OXYCODONE HYDROCHLORIDE 5 MG: 5 TABLET ORAL at 02:30

## 2019-03-11 RX ADMIN — POLYETHYLENE GLYCOL 3350 17 G: 17 POWDER, FOR SOLUTION ORAL at 14:23

## 2019-03-11 RX ADMIN — OXYCODONE HYDROCHLORIDE 5 MG: 5 TABLET ORAL at 10:51

## 2019-03-11 RX ADMIN — ACETAMINOPHEN 1000 MG: 500 TABLET ORAL at 02:31

## 2019-03-11 RX ADMIN — ACETAMINOPHEN 1000 MG: 500 TABLET ORAL at 18:46

## 2019-03-11 RX ADMIN — ACETAMINOPHEN 1000 MG: 500 TABLET ORAL at 10:51

## 2019-03-11 RX ADMIN — ONDANSETRON 4 MG: 2 INJECTION INTRAMUSCULAR; INTRAVENOUS at 04:10

## 2019-03-11 RX ADMIN — SENNOSIDES 1 TABLET: 8.6 TABLET, FILM COATED ORAL at 09:36

## 2019-03-11 RX ADMIN — VANCOMYCIN HYDROCHLORIDE 1250 MG: 10 INJECTION, POWDER, LYOPHILIZED, FOR SOLUTION INTRAVENOUS at 04:10

## 2019-03-11 RX ADMIN — SUCRALFATE 1 G: 1 SUSPENSION ORAL at 13:08

## 2019-03-11 RX ADMIN — OXYCODONE HYDROCHLORIDE 5 MG: 5 TABLET ORAL at 20:46

## 2019-03-11 RX ADMIN — MINERAL OIL AND WHITE PETROLATUM: 150; 830 OINTMENT OPHTHALMIC at 21:56

## 2019-03-11 RX ADMIN — AMITRIPTYLINE HYDROCHLORIDE 50 MG: 50 TABLET, FILM COATED ORAL at 20:36

## 2019-03-11 RX ADMIN — SUCRALFATE 1 G: 1 SUSPENSION ORAL at 20:38

## 2019-03-11 RX ADMIN — GUANFACINE 3 MG: 2 TABLET ORAL at 21:57

## 2019-03-11 RX ADMIN — OXYCODONE HYDROCHLORIDE 5 MG: 5 TABLET ORAL at 16:09

## 2019-03-11 RX ADMIN — COLCHICINE 0.6 MG: 0.6 TABLET, FILM COATED ORAL at 20:36

## 2019-03-11 RX ADMIN — POLYETHYLENE GLYCOL 3350 17 G: 17 POWDER, FOR SOLUTION ORAL at 09:36

## 2019-03-11 RX ADMIN — SUCRALFATE 1 G: 1 SUSPENSION ORAL at 09:32

## 2019-03-11 RX ADMIN — SUCRALFATE 1 G: 1 SUSPENSION ORAL at 16:09

## 2019-03-11 ASSESSMENT — ACTIVITIES OF DAILY LIVING (ADL)
ADLS_ACUITY_SCORE: 14
ADLS_ACUITY_SCORE: 16
ADLS_ACUITY_SCORE: 16
ADLS_ACUITY_SCORE: 14
ADLS_ACUITY_SCORE: 16
ADLS_ACUITY_SCORE: 16

## 2019-03-11 NOTE — PROGRESS NOTES
[facility] Nurse Inpatient Wound Assessment   Reason for consultation: Evaluate and treat left dorsal ankle wound     Assessment  Left dorsal ankle wound due to Surgical Wound  Status: initial assessment    Treatment Plan  Left dorsal ankle wound:  Plan in place per Ortho for dilute betadine soaks followed by non-adhesive dressing, gauze and ACE. Due to tenderness of wound, would not recommend packing at this time.  Orders Written  WOC Nurse follow-up plan:weekly  Nursing to notify the Provider(s) and re-consult the WOC Nurse if wound(s) deteriorates or new skin concern.    Patient History  According to provider note(s): Samara Oropeza is a 23 year old female with PMH including anxiety, Behcet's disease, fibromyalgia, GERD, migraines, seizure disorder, Tourette's and s/p L ankle arthroscopy and ATFL repair on 1/2 by Dr. Johnson presenting with increasing erythema, drainage and pain surrounding her surgical incision, found to have cellulitis. Based on current physical exam, no concern for deep soft tissue abscess requiring surgical intervention.     Objective Data  Containment of urine/stool: Continent of bowel and Continent of bladder    Active Diet Order  Orders Placed This Encounter      Regular Diet Adult      Output:   I/O last 3 completed shifts:  In: 2865 [P.O.:2240; I.V.:625]  Out: 250 [Urine:250]    Risk Assessment:   Sensory Perception: 4-->no impairment  Moisture: 4-->rarely moist  Activity: 3-->walks occasionally  Mobility: 3-->slightly limited  Nutrition: 3-->adequate  Friction and Shear: 3-->no apparent problem  Micah Score: 20                          Labs:   Recent Labs   Lab 03/11/19  0713  03/09/19  1120   ALBUMIN  --   --  3.3*   HGB 10.8*   < > 12.4   WBC 4.3   < > 6.6   CRP 29.4*   < > 84.0*    < > = values in this interval not displayed.       Physical Exam  Skin inspection: focused left foot and ankle    Wound Location:  Left dorsal ankle      Date of last photo 3/11/2019  Wound History: See  H&P above  Measurements (length x width x depth, in cm) 2.2 cm x 1.8 cm  x  Due to pain, patient does not allow for wound exploration with Q-tip, unknown depth  Wound Base: yellow fibrin.   Tunneling unknown, patient not tolerating exam  Undermining unknown, patient not tolerating exam  Palpation of the wound bed: normal   Periwound skin: intact  Color: pink  Temperature: normal   Drainage:, scant  Description of drainage: yellow  Odor: none  Pain: tension to hands, feet and body, stabbing with touch    Interventions  Current support surface: Standard  Atmos Air mattress  Current off-loading measures: Pillows under calves  Visual inspection of wound(s) completed  Wound Care: completed by RN  Supplies: see MD orders  Education provided: wound progress  Discussed plan of care with Patient    Jody Rachel RN, CWOCN

## 2019-03-11 NOTE — CONSULTS
INFECTIOUS DISEASE CONSULTATION    NAME: Samara Oropeza  MRN:  [id number]  AGE:  23 year old            :  1995    PRIMARY CARE PROVIDER:  Sonja Abreu  Consult Requester:  Duane Ma MD     Date of Admission:   3/9/2019  Date of Visit:  2019    REASON FOR CONSULT: left foot infection    IMPRESSION:    While the patient's WBC has been normal and the CRP has declined, she continues to have a small amount of purulent drainage and no clear improvement of the erythema (based upon the line drawn of the border of the erythema earlier during this hospitalization) on the dorsum of the foot. She continues to have notable swelling and tenderness of the foot and has pain limiting the motion of her ankle. This may be due to an undrained collection.    The medical record includes allergies to both penicillins (possibly a rash, patient's father was uncertain) and cephalosporins (hives).    The culture has thus far grown Staphylococcus aureus and coagulase-negative Staphylococcus susceptibility results are pending at this time.    On examination, the patient has a soft, grade I/VI systolic murmur best heard at the left sternal border.    SUGGESTIONS:      1. Continue IV vancomycin while awaiting susceptibility results for the Staphylococcus aureus   2. Evaluation by podiatry and consideration of imaging to look for an undrained collection to explain the slow clinical response.    HISTORY OF PRESENT ILLNESS:      This 23-year-old woman with a history of Behçet disease had left ankle arthroscopy and AFTL repair on 2019. Following the procedure the patient had difficulty with wound healing. In February she was placed on oral clindamycin due to the appearance of the wound with improvement. She then had erythema with drainage from the dorsum of the left foot (wound dehiscence) and on 3/7/2018 was started again on oral clindamycin. Erythema and foot pain progressed and she was admitted here on  3/9/2019.    The patient noted chills at home without fever. She was begun on IV vancomycin and cultures obtained from the wound are now growing Staphylococcus aureus and coagulase-negative Staphylococcus with pending susceptibilities.     The patient has a soft grade I/VI systolic murmur best heard at the left sternal border on my exam. She has no conjunctival hemorrhage and no splinter hemorrhages on the toes; her fingernails have nail polish and could not be examined for splinter hemorrhages.    Since admission, her WBC has remained normal. Her CRP has fallen from 84 to 51.8 to 29.4. Her swelling and erythema are not, however, notably changed.    Tetanus immunization: 10/24/2017    PAST MEDICAL HISTORY:  Past Medical History:   Diagnosis Date     Anemia      Anxiety      Arthritis      Behcet's disease (H)      Cervical adenitis May 2010     Chronic abdominal pain      Constipation, chronic 1994     Fibromyalgia      Gastro-oesophageal reflux disease      Gastroparesis      Irregular heart beat     tachycardia, has had workup     Migraines      Neuromuscular disorder (H)     fibramyalgia     Palpitations      Seizure (H)      Seizures (H)     unknown etiology     Syncope      Tourette's        PAST SURGICAL HISTORY:  Past Surgical History:   Procedure Laterality Date     ARTHROSCOPY ANKLE, REPAIR LIGAMENT Left 1/2/2019    Procedure: Ankle arthroscopy and sinus tarsi evacuation, ligament repair, left lower extremity;  Surgeon: Andres Johnson DPM;  Location:  OR     ARTHROSCOPY KNEE WITH PATELLAR REALIGNMENT  7/25/2013    Procedure: ARTHROSCOPY KNEE WITH PATELLAR REALIGNMENT;  Left Knee Arthroscopy, Medial Patellofemoral Ligament Reconstruction with Allograft  ;  Surgeon: Jennifer Acevedo MD;  Location: [facility]     COLONOSCOPY  2015     DENTAL SURGERY  1996    Teeth removal     ENDOSCOPY UPPER, COLONOSCOPY, COMBINED  2005     HC ESOPH/GAS REFLUX TEST W NASAL IMPED >1 HR N/A 2/15/2017    Procedure:  ESOPHAGEAL IMPEDENCE FUNCTION TEST WITH 24 HOUR PH GREATER THAN 1 HOUR;  Surgeon: Timothy Matta MD;  Location:  GI       ALLERGIES:  Amoxil [penicillins]; Bee venom; Contrast dye; Kiwi; Orange fruit [citrus]; Pineapple; Reglan [metoclopramide]; Ace inhibitors; Amitiza [lubiprostone]; Amoxicillin-pot clavulanate; Latex; Midazolam; No clinical screening - see comments; Versed; Adhesive tape; Azithromycin; Cephalexin; and Keflex [cephalexin-fd&c yellow #6]    CURRENT MEDICATIONS:  Current Facility-Administered Medications   Medication     acetaminophen (TYLENOL) tablet 1,000 mg     albuterol (PROAIR HFA/PROVENTIL HFA/VENTOLIN HFA) 108 (90 Base) MCG/ACT inhaler 2 puff     amitriptyline (ELAVIL) tablet 50 mg     artificial tears ophthalmic ointment     benzocaine (ORAJEL MAXIMUM STRENGTH) 20 % gel     colchicine (COLCYRS) tablet 0.6 mg     cyproheptadine (PERIACTIN) tablet 4 mg     dicyclomine (BENTYL) tablet 20 mg     diphenhydrAMINE (BENADRYL) capsule 25-50 mg     guanFACINE (TENEX) tablet 3 mg     hydrOXYzine (ATARAX) tablet 25-50 mg     hypromellose (ARTIFICIAL TEARS) 0.5 % ophthalmic solution 1 drop     lactase (LACTAID) tablet 3,000 Units     lactulose (CHRONULAC) solution 30 mL     linaclotide (LINZESS) capsule 145 mcg     magic mouthwash suspension (diphenhydrAMINE, lidocaine, aluminum-magnesium & simethicone)     melatonin tablet 1 mg     naloxone (NARCAN) injection 0.1-0.4 mg     nitroFURantoin macrocrystal-monohydrate (MACROBID) capsule 100 mg     norgestimate-ethinyl estradiol (ORTHO-CYCLEN/SPRINTEC) 0.25-35 MG-MCG per tablet 1 tablet     ondansetron (ZOFRAN-ODT) ODT tab 4 mg    Or     ondansetron (ZOFRAN) injection 4 mg     oxyCODONE (ROXICODONE) tablet 5 mg     polyethylene glycol (MIRALAX/GLYCOLAX) Packet 17 g     sennosides (SENOKOT) tablet 1 tablet     sucralfate (CARAFATE) suspension 1 g     vancomycin (VANCOCIN) 1,250 mg in sodium chloride 0.9 % 250 mL intermittent infusion       FAMILY  HISTORY:  Family History   Problem Relation Age of Onset     Depression Mother      Neurologic Disorder Mother         Migraines, take imitrex injection.  Also in maternal grandmother.       Alcohol/Drug Father      Hypertension Father      Depression Father      Osteoarthritis Father      Cardiovascular Maternal Grandmother      Depression Maternal Grandmother      Hypertension Maternal Grandmother      Alzheimer Disease Maternal Grandmother      Cardiovascular Maternal Grandfather      Hypertension Maternal Grandfather      Depression Maternal Grandfather      Alcohol/Drug Maternal Grandfather      Cardiovascular Paternal Grandmother      Hypertension Paternal Grandmother      Cardiovascular Paternal Grandfather      Hypertension Paternal Grandfather      Glaucoma No family hx of      Macular Degeneration No family hx of        SOCIAL HISTORY:  Social History     Socioeconomic History     Marital status: Single     Spouse name: Not on file     Number of children: Not on file     Years of education: Not on file     Highest education level: Not on file   Occupational History     Not on file   Social Needs     Financial resource strain: Not on file     Food insecurity:     Worry: Not on file     Inability: Not on file     Transportation needs:     Medical: Not on file     Non-medical: Not on file   Tobacco Use     Smoking status: Never Smoker     Smokeless tobacco: Never Used   Substance and Sexual Activity     Alcohol use: Yes     Alcohol/week: 0.6 oz     Types: 1 Standard drinks or equivalent per week     Comment: rarely     Drug use: No     Sexual activity: Yes     Partners: Male     Birth control/protection: Pill   Lifestyle     Physical activity:     Days per week: Not on file     Minutes per session: Not on file     Stress: Not on file   Relationships     Social connections:     Talks on phone: Not on file     Gets together: Not on file     Attends Confucianism service: Not on file     Active member of club or  "organization: Not on file     Attends meetings of clubs or organizations: Not on file     Relationship status: Not on file     Intimate partner violence:     Fear of current or ex partner: Not on file     Emotionally abused: Not on file     Physically abused: Not on file     Forced sexual activity: Not on file   Other Topics Concern     Parent/sibling w/ CABG, MI or angioplasty before 65F 55M? Not Asked   Social History Narrative    Boyfriend of 5 years, living with \"in laws\" Oct 2017 and looking forward to their own apartment.        REVIEW OF SYSTEMS:  Positive for chills prior to admission without fevers. Positive for nausea and vomiting due to gastroparesis. Positive for constipation. Positive for occasional shortness of breath prior to admission. Positive for left foot and ankle pain and swelling. Has headaches for which she receives Botox. No stiff neck. No changes in vision or hearing. A 12-point ROS is otherwise negative.    LABORATORY RESULTS:  Inflammatory Markers    Recent Labs   Lab Test 03/11/19  0713 03/10/19  0643 03/09/19  1120 04/18/18  1724 12/21/17  1821 04/21/17  1249 04/14/17  1351 11/06/16  1341 03/11/16  1350 11/18/15  1453 11/14/15  1300 05/06/15  0220 12/31/14  1932 12/08/14  2140   SED  --   --   --   --  13  --   --  4 5 6 6 6 6 10   CRP 29.4* 51.8* 84.0* <2.9 12.0* <2.9 <2.9 <2.9 <2.9 <2.9 <2.9 <2.9 <2.9 8.3*       Hematology Studies    Recent Labs   Lab Test 03/11/19  0713 03/10/19  1734 03/10/19  0643 03/09/19  1120 12/12/18  1527 10/30/18  1606 06/01/18  1208 04/18/18  1724 01/17/18  1610 01/08/18  1550 12/21/17  1821   WBC 4.3  --  5.3 6.6 4.5 4.5 4.7 5.0 5.8 5.1 3.9*   ANEU  --   --   --  4.6  --  2.3  --  2.2 2.7 2.5 1.4*   AEOS  --   --   --  0.1  --  0.1  --  0.1 0.1 0.1 0.1   HGB 10.8* 11.2* 11.4* 12.4 12.2 12.7 11.9 11.7 11.9 10.9* 12.5   MCV 90  --  91 92 88 89 87 87 88 88 84     --  185 211 236 213 257 215 243 243 272       Immune Globulin Studies    Recent Labs   Lab " Test 01/17/18  1610   IGA 97       Metabolic Studies     Recent Labs   Lab Test 03/11/19  0713 03/09/19  1120 12/12/18  1527 10/30/18  1606 04/18/18  1724    136 137 138 142   POTASSIUM 4.0 3.9 3.9 4.0 3.9   CHLORIDE 109 104 105 106 108   CO2 24 23 24 24 24   BUN 7 11 8 13 11   CR 0.70 0.77 0.67 0.79 0.77   GFRESTIMATED >90 >90 >90 89 >90       Hepatic Studies    Recent Labs   Lab Test 03/09/19  1120 12/12/18  1527 10/30/18  1606 04/18/18  1724 01/08/18  1440 11/03/17  1114   BILITOTAL 0.4 0.3 0.3 0.5 0.4 0.3   ALKPHOS 74 55 69 81 91 99   ALBUMIN 3.3* 3.7 3.7 4.0 3.8 4.0   AST 20 34 34 26 26 34   ALT 26 33 35 28 34 40       Thyroid Studies    Recent Labs   Lab Test 10/30/18  1606 11/26/16  1440   TSH 1.06 0.87       Microbiology:  Culture Micro   Date Value Ref Range Status   03/09/2019 (A)  Preliminary    Moderate growth  Staphylococcus aureus  Susceptibility testing in progress     03/09/2019 (A)  Preliminary    Moderate growth  Coagulase negative Staphylococcus  Susceptibility testing not routinely done     12/05/2018 (A)  Final    >100,000 colonies/mL  Staphylococcus saprophyticus     10/31/2018 No growth  Final   09/06/2018 (A)  Final    50,000 to 100,000 colonies/mL  Staphylococcus aureus     09/06/2018 (A)  Final    50,000 to 100,000 colonies/mL  Coagulase negative Staphylococcus     11/29/2016 No Beta Streptococcus isolated  Final   04/19/2013 No Beta Streptococcus isolated  Final   01/01/2012   Final    Moderate growth Beta hemolytic Streptococcus group A  Critical Value called to and read back by Dr. Kuo (URPER) 1059 1/2/12,hg   05/20/2011 No Beta Streptococcus isolated  Final       Urine Studies    Recent Labs   Lab Test 12/05/18  1348 10/31/18  1456 09/06/18  1345 04/18/18  1641 01/08/18  1553 11/03/17  1239   LEUKEST Small*  --  Small* Negative Negative Negative   WBCU 25-50* 10-25* 25-50* 1 0  --        Hepatitis B Testing   Recent Labs   Lab Test 01/11/16  1218   HBCAB Nonreactive   HEPBANG  Nonreactive     Hepatitis C Testing     Hepatitis C Antibody   Date Value Ref Range Status   2016  NR Final    Nonreactive   Assay performance characteristics have not been established for newborns,   infants, and children         Vitals:    03/10/19 0803 03/10/19 1145 03/10/19 1626 19 0654   BP: 102/73 112/76 105/62 127/80   BP Location: Left arm Left arm Left arm Left arm   Pulse:  88  73   Resp: 16 16 16 16   Temp: 97.6  F (36.4  C) 97.7  F (36.5  C) 96.9  F (36.1  C) 97.2  F (36.2  C)   TempSrc: Oral Oral Oral Oral   SpO2: 100% 98% 98% 99%   Weight:       Height:         Temp (high/low/average during past 24 hours): Temp (24hrs), Av.3  F (36.3  C), Min:96.9  F (36.1  C), Max:97.7  F (36.5  C)      PHYSICAL EXAMINATION:  Vital signs as above  General:Pleasant young woman lying in bed with her left foot in an Ace bandage and elevated on a pillow  Skin: Several tattoos  Lymph nodes exam: No submental, cervical, supraclavicular, or axillary nodes were palpable.  HEENT: NCAT. EOMI. No conjunctival hemorrhage. No scleral icterus. 5 piercings were present in the external ears bilaterally without purulence or erythema. Good dentition. Oropharynx without exudate or erythema.  Neck: Supple  Back: No costovertebral angle tenderness or spinal tenderness.   Lungs: Clear to auscultation  Cardiac: RRR S1S2 with a grade I/VI systolic murmur best heard at the left sternal border  Abdomen: Normal bowel sounds, soft, non-tender  Extremities: No splinter hemorrhages of toenails. Left lower extremity with notable erythema, swelling, and tenderness of the dorsum of the foot and ankle. An approximately 2 mm area on the dorsum of the foot has purulent drainage. Able to move all four extremities. She did not move her left ankle as a result of pain.  Neuro: AOX3. CN II-XII intact (I did not test gag or corneal reflex) except that the patient was unable to wrinkle her forehead muscles; she stated that she receives Botox  for headaches.     Electronically signed by Duane Bejarano M.D.  3/11/2019 10:48 AM  --

## 2019-03-11 NOTE — PROGRESS NOTES
VSS. A & O x 4. Denies SOB. Lung sounds clear. L foot has ace wrap, Foot soak on day and another should be done on evenings. Pt reported L foot pain, scheduled tylenol and prn oxycodone given. Pt reports intermittent nausea but didn't want to take anything for it.  R PIV, IV SL. Pt requested to have IV vancomycin rate slowed at 100 mL/hr as she had discomfort from previous dose. Up independently in room w/ crutches. Voiding spontaneously without difficulty. LBM 3/7/19. Call light in reach, able to make needs known.

## 2019-03-11 NOTE — PROGRESS NOTES
Patient was seen, case reviewed with Dr. Corrales, infectious disease, orthopedics.    Patient states left foot pain and swelling seem to be about the same as yesterday.  She has had intermittent nausea,  denies fevers, chills, abdominal pain, diarrhea. Last BM was on 3/6/19, per Pt.  She has been elevating her leg as instructed for the most part, is walking short distances at times with crutches    She was seen by orthopedics this morning, who recommended no surgical intervention at this time.    Afebrile, blood pressure normal  Patient is sleepy, easily alerts, is quite pleasant, does not appear acutely ill  Lungs clear  CV regular rate and rhythm  Abdomen soft  Left lateral ankle and foot are edematous with erythema perhaps slightly less extensive compared to the skin marking.  Open area no ankle nausea with scant serous drainage.  No calf edema is      Results for LUCINA BAH (MRN [id number]) as of 3/11/2019 13:57   Ref. Range 3/9/2019 13:13 3/10/2019 06:43 3/10/2019 17:34 3/11/2019 07:13   CRP Inflammation Latest Ref Range: 0.0 - 8.0 mg/L  51.8 (H)  29.4 (H)     Results for LUCINA BAH (MRN [id number]) as of 3/11/2019 13:57   Ref. Range 3/11/2019 07:13   Sodium Latest Ref Range: 133 - 144 mmol/L 139   Potassium Latest Ref Range: 3.4 - 5.3 mmol/L 4.0   Chloride Latest Ref Range: 94 - 109 mmol/L 109   Carbon Dioxide Latest Ref Range: 20 - 32 mmol/L 24   Urea Nitrogen Latest Ref Range: 7 - 30 mg/dL 7   Creatinine Latest Ref Range: 0.52 - 1.04 mg/dL 0.70   GFR Estimate Latest Ref Range: >60 mL/min/1.73_m2 >90   GFR Estimate If Black Latest Ref Range: >60 mL/min/1.73_m2 >90   Calcium Latest Ref Range: 8.5 - 10.1 mg/dL 8.2 (L)   Anion Gap Latest Ref Range: 3 - 14 mmol/L 6   CRP Inflammation Latest Ref Range: 0.0 - 8.0 mg/L 29.4 (H)   Glucose Latest Ref Range: 70 - 99 mg/dL 103 (H)   WBC Latest Ref Range: 4.0 - 11.0 10e9/L 4.3   Hemoglobin Latest Ref Range: 11.7 - 15.7 g/dL 10.8 (L)   Hematocrit  Latest Ref Range: 35.0 - 47.0 % 33.6 (L)   Platelet Count Latest Ref Range: 150 - 450 10e9/L 200   RBC Count Latest Ref Range: 3.8 - 5.2 10e12/L 3.75 (L)   MCV Latest Ref Range: 78 - 100 fl 90   MCH Latest Ref Range: 26.5 - 33.0 pg 28.8   MCHC Latest Ref Range: 31.5 - 36.5 g/dL 32.1   RDW Latest Ref Range: 10.0 - 15.0 % 13.0     Left foot wound, moderate growth staph aureus, sensitivities pending      Assessment    Left foot cellulitis, status post left ankle arthroscopy and ATFL repair January 2.  Patient does have a draining wound as noted above.  Exam is very slightly improved since admission.  CRP is trending down.  Plain films of the foot revealed no acute bone abnormality.  Orthopedics recommends continued antibiotic therapy, without current need for surgical treatment.  Culture reveals staph aureus.  Patient remains on IV vancomycin, which was started on admission patient failed outpatient management with oral clindamycin    Anemia, mild, possibly secondary to acute infection.  Patient does have a history of heavy menses, might have concurrent element of iron deficiency    GERD, stable, on Carafate    Nausea, possibly medication related i.e. colchicine, oxycodone, antibiotics, constipation    Behcet's disease, stable      Plan  Continue IV vancomycin  Monitor clinical features including exam, CRP  Podiatry consultation, review and the indication for further imaging of foot  Infectious disease is following  Bowel regimen  No chemical DVT prophylaxis, as patient is ambulating some, and in view of heavy menses

## 2019-03-11 NOTE — PROGRESS NOTES
"Orthopaedic Surgery Progress Note   March 11, 2019    Subjective: No acute events overnight. Pain controlled with medication. Remains afebrile. NPO pending evaluation this AM. Tolerated foot soaks yesterday.     Objective: /80 (BP Location: Left arm)   Pulse 73   Temp 97.2  F (36.2  C) (Oral)   Resp 16   Ht 1.6 m (5' 3\")   Wt 74.3 kg (163 lb 14.4 oz)   LMP 03/09/2019   SpO2 99%   BMI 29.03 kg/m      General: NAD, alert and oriented, cooperative with exam.   Cardio: RRR, extremities wwp.   Respiratory: Non-labored breathing.  MSK: LLE: Toes wwp, DP 2+, bcr in all toes. Erythema appears stable as compared to yesterday. Stable lateral swelling. Tolerates passive ankle ROM including dorsi and plantarflexion, eversion and inversion. +EHL/FHL, patient is hesitant to demonstrate TA and GSC but is able to with encouragement. SILT SP/DP/Sa/Cat/T. Dressing with serous drainage. Fibrinous tissue at the wound dehiscence but no purulence. No fluid expressible from the wound. No focal fluctuance. Non-tender to palpation of the medial tibiotalar joint line.     Labs:   WBC 4.3  Hgb 10.8  Plts 200  CRP 84 -> 51.8 -> 29.4  ED Wound Cx: Staph aureus.     Assessment and Plan: Samara Oropeza is a 23 year old female with PMH including anxiety, Behcet's disease, fibromyalgia, GERD, migraines, seizure disorder, Tourette's and s/p L ankle arthroscopy and ATFL repair on 1/2 by Dr. Johnson presenting with increasing erythema, drainage and pain surrounding her surgical incision, found to have cellulitis. Based on current physical exam, no concern for deep soft tissue abscess requiring surgical intervention.      Internal Medicine Primary.  Plan for OR: No surgical indication at this time.   Activity: Up with assist until independent.  Weight bearing status: WBAT with CAM boot as instructed by her surgeon.   Pain management: Per primary.    Antibiotics/Tetanus: Per primary. Currently, Clindamycin and Vancomycin.   Diet: " Per primary. Okay for a diet today from Orthopedic Surgery perspective.   Anticoagulation/DVT prophylaxis: Per primary.    Imaging: No further imaging needed at this time.   Labs: Monitor inflammatory markers.  Bracing/Splinting: CAM boot as instructed by her surgeon.    Dressings: Keep clean, dry and intact - recommend covering wound with non-adhesive dressing such as adaptic, gauze and ACE bandage. Will start BID dilute betadine soaks today - order placed.   Elevation: Elevate LLE on pillows to keep above the level of the heart as much as possible.   Cultures: Pending, follow culture results closely.    Follow-up: Clinic with Dr. Johnson within 1 week.   Disposition: Per primary and antibiotic plan - okay to discharge when this is established.       Miley Esteves MD  Orthopaedic Surgery Resident, PGY-4  Pager: [phone]     For questions about this patient during weekday business hours, please attempt to contact me at my pager prior to contacting the Orthopaedic Surgery resident on call. On the weekends and overnight, please page the Orthopaedic Surgery resident on call. Thank you!

## 2019-03-12 ENCOUNTER — ANESTHESIA EVENT (OUTPATIENT)
Dept: SURGERY | Facility: CLINIC | Age: 24
DRG: 857 | End: 2019-03-12
Payer: COMMERCIAL

## 2019-03-12 ENCOUNTER — ANESTHESIA (OUTPATIENT)
Dept: SURGERY | Facility: CLINIC | Age: 24
DRG: 857 | End: 2019-03-12
Payer: COMMERCIAL

## 2019-03-12 ENCOUNTER — APPOINTMENT (OUTPATIENT)
Dept: ULTRASOUND IMAGING | Facility: CLINIC | Age: 24
DRG: 857 | End: 2019-03-12
Payer: COMMERCIAL

## 2019-03-12 LAB
HCG UR QL: NEGATIVE
VANCOMYCIN SERPL-MCNC: 9 MG/L

## 2019-03-12 PROCEDURE — 25000131 ZZH RX MED GY IP 250 OP 636 PS 637: Performed by: INTERNAL MEDICINE

## 2019-03-12 PROCEDURE — 25000128 H RX IP 250 OP 636: Performed by: NURSE ANESTHETIST, CERTIFIED REGISTERED

## 2019-03-12 PROCEDURE — 25000125 ZZHC RX 250: Performed by: NURSE ANESTHETIST, CERTIFIED REGISTERED

## 2019-03-12 PROCEDURE — 25800030 ZZH RX IP 258 OP 636: Performed by: NURSE ANESTHETIST, CERTIFIED REGISTERED

## 2019-03-12 PROCEDURE — 36000053 ZZH SURGERY LEVEL 2 EA 15 ADDTL MIN - UMMC: Performed by: ORTHOPAEDIC SURGERY

## 2019-03-12 PROCEDURE — 25000566 ZZH SEVOFLURANE, EA 15 MIN: Performed by: ORTHOPAEDIC SURGERY

## 2019-03-12 PROCEDURE — 87176 TISSUE HOMOGENIZATION CULTR: CPT | Performed by: ORTHOPAEDIC SURGERY

## 2019-03-12 PROCEDURE — 40000170 ZZH STATISTIC PRE-PROCEDURE ASSESSMENT II: Performed by: ORTHOPAEDIC SURGERY

## 2019-03-12 PROCEDURE — 99232 SBSQ HOSP IP/OBS MODERATE 35: CPT | Performed by: INTERNAL MEDICINE

## 2019-03-12 PROCEDURE — 27210794 ZZH OR GENERAL SUPPLY STERILE: Performed by: ORTHOPAEDIC SURGERY

## 2019-03-12 PROCEDURE — 87070 CULTURE OTHR SPECIMN AEROBIC: CPT | Performed by: ORTHOPAEDIC SURGERY

## 2019-03-12 PROCEDURE — 25000132 ZZH RX MED GY IP 250 OP 250 PS 637: Performed by: INTERNAL MEDICINE

## 2019-03-12 PROCEDURE — 25000132 ZZH RX MED GY IP 250 OP 250 PS 637: Performed by: ORTHOPAEDIC SURGERY

## 2019-03-12 PROCEDURE — 36000051 ZZH SURGERY LEVEL 2 1ST 30 MIN - UMMC: Performed by: ORTHOPAEDIC SURGERY

## 2019-03-12 PROCEDURE — 12000001 ZZH R&B MED SURG/OB UMMC

## 2019-03-12 PROCEDURE — 37000008 ZZH ANESTHESIA TECHNICAL FEE, 1ST 30 MIN: Performed by: ORTHOPAEDIC SURGERY

## 2019-03-12 PROCEDURE — 25800030 ZZH RX IP 258 OP 636

## 2019-03-12 PROCEDURE — 87075 CULTR BACTERIA EXCEPT BLOOD: CPT | Performed by: ORTHOPAEDIC SURGERY

## 2019-03-12 PROCEDURE — 80202 ASSAY OF VANCOMYCIN: CPT | Performed by: INTERNAL MEDICINE

## 2019-03-12 PROCEDURE — 87077 CULTURE AEROBIC IDENTIFY: CPT | Performed by: ORTHOPAEDIC SURGERY

## 2019-03-12 PROCEDURE — 25000125 ZZHC RX 250: Performed by: ORTHOPAEDIC SURGERY

## 2019-03-12 PROCEDURE — 71000015 ZZH RECOVERY PHASE 1 LEVEL 2 EA ADDTL HR: Performed by: ORTHOPAEDIC SURGERY

## 2019-03-12 PROCEDURE — 37000009 ZZH ANESTHESIA TECHNICAL FEE, EACH ADDTL 15 MIN: Performed by: ORTHOPAEDIC SURGERY

## 2019-03-12 PROCEDURE — 25000128 H RX IP 250 OP 636: Performed by: ANESTHESIOLOGY

## 2019-03-12 PROCEDURE — 76882 US LMTD JT/FCL EVL NVASC XTR: CPT | Mod: LT

## 2019-03-12 PROCEDURE — 8E0YXY8 SUTURE REMOVAL FROM LOWER EXTREMITY: ICD-10-PCS | Performed by: ORTHOPAEDIC SURGERY

## 2019-03-12 PROCEDURE — 25000128 H RX IP 250 OP 636: Performed by: INTERNAL MEDICINE

## 2019-03-12 PROCEDURE — 87186 SC STD MICRODIL/AGAR DIL: CPT | Performed by: ORTHOPAEDIC SURGERY

## 2019-03-12 PROCEDURE — 0SBJ0ZZ EXCISION OF LEFT TARSAL JOINT, OPEN APPROACH: ICD-10-PCS | Performed by: ORTHOPAEDIC SURGERY

## 2019-03-12 PROCEDURE — 25000128 H RX IP 250 OP 636: Performed by: ORTHOPAEDIC SURGERY

## 2019-03-12 PROCEDURE — 25800030 ZZH RX IP 258 OP 636: Performed by: INTERNAL MEDICINE

## 2019-03-12 PROCEDURE — 81025 URINE PREGNANCY TEST: CPT | Performed by: ANESTHESIOLOGY

## 2019-03-12 PROCEDURE — 71000014 ZZH RECOVERY PHASE 1 LEVEL 2 FIRST HR: Performed by: ORTHOPAEDIC SURGERY

## 2019-03-12 PROCEDURE — 36415 COLL VENOUS BLD VENIPUNCTURE: CPT | Performed by: INTERNAL MEDICINE

## 2019-03-12 RX ORDER — LACTULOSE 10 G/15ML
20 SOLUTION ORAL 3 TIMES DAILY PRN
Status: DISCONTINUED | OUTPATIENT
Start: 2019-03-12 | End: 2019-03-16 | Stop reason: HOSPADM

## 2019-03-12 RX ORDER — OXYCODONE HYDROCHLORIDE 5 MG/1
5-10 TABLET ORAL
Status: DISCONTINUED | OUTPATIENT
Start: 2019-03-12 | End: 2019-03-16 | Stop reason: HOSPADM

## 2019-03-12 RX ORDER — MAGNESIUM HYDROXIDE 1200 MG/15ML
LIQUID ORAL PRN
Status: DISCONTINUED | OUTPATIENT
Start: 2019-03-12 | End: 2019-03-12 | Stop reason: HOSPADM

## 2019-03-12 RX ORDER — NALOXONE HYDROCHLORIDE 0.4 MG/ML
.1-.4 INJECTION, SOLUTION INTRAMUSCULAR; INTRAVENOUS; SUBCUTANEOUS
Status: DISCONTINUED | OUTPATIENT
Start: 2019-03-12 | End: 2019-03-16 | Stop reason: HOSPADM

## 2019-03-12 RX ORDER — SODIUM CHLORIDE, SODIUM LACTATE, POTASSIUM CHLORIDE, CALCIUM CHLORIDE 600; 310; 30; 20 MG/100ML; MG/100ML; MG/100ML; MG/100ML
INJECTION, SOLUTION INTRAVENOUS CONTINUOUS PRN
Status: DISCONTINUED | OUTPATIENT
Start: 2019-03-12 | End: 2019-03-12

## 2019-03-12 RX ORDER — GLYCOPYRROLATE 0.2 MG/ML
INJECTION, SOLUTION INTRAMUSCULAR; INTRAVENOUS PRN
Status: DISCONTINUED | OUTPATIENT
Start: 2019-03-12 | End: 2019-03-12

## 2019-03-12 RX ORDER — ONDANSETRON 4 MG/1
4 TABLET, ORALLY DISINTEGRATING ORAL EVERY 30 MIN PRN
Status: DISCONTINUED | OUTPATIENT
Start: 2019-03-12 | End: 2019-03-12 | Stop reason: HOSPADM

## 2019-03-12 RX ORDER — HYDROMORPHONE HYDROCHLORIDE 1 MG/ML
.3-.5 INJECTION, SOLUTION INTRAMUSCULAR; INTRAVENOUS; SUBCUTANEOUS
Status: DISPENSED | OUTPATIENT
Start: 2019-03-12 | End: 2019-03-12

## 2019-03-12 RX ORDER — DIAZEPAM 10 MG/2ML
5 INJECTION, SOLUTION INTRAMUSCULAR; INTRAVENOUS ONCE
Status: COMPLETED | OUTPATIENT
Start: 2019-03-12 | End: 2019-03-12

## 2019-03-12 RX ORDER — ONDANSETRON 2 MG/ML
4 INJECTION INTRAMUSCULAR; INTRAVENOUS EVERY 30 MIN PRN
Status: DISCONTINUED | OUTPATIENT
Start: 2019-03-12 | End: 2019-03-12 | Stop reason: HOSPADM

## 2019-03-12 RX ORDER — NEOSTIGMINE METHYLSULFATE 1 MG/ML
VIAL (ML) INJECTION PRN
Status: DISCONTINUED | OUTPATIENT
Start: 2019-03-12 | End: 2019-03-12

## 2019-03-12 RX ORDER — HYDROMORPHONE HYDROCHLORIDE 1 MG/ML
.3-.5 INJECTION, SOLUTION INTRAMUSCULAR; INTRAVENOUS; SUBCUTANEOUS
Status: ACTIVE | OUTPATIENT
Start: 2019-03-12 | End: 2019-03-13

## 2019-03-12 RX ORDER — HYDROMORPHONE HYDROCHLORIDE 1 MG/ML
.3-.5 INJECTION, SOLUTION INTRAMUSCULAR; INTRAVENOUS; SUBCUTANEOUS EVERY 10 MIN PRN
Status: DISCONTINUED | OUTPATIENT
Start: 2019-03-12 | End: 2019-03-12 | Stop reason: HOSPADM

## 2019-03-12 RX ORDER — FENTANYL CITRATE 50 UG/ML
25-50 INJECTION, SOLUTION INTRAMUSCULAR; INTRAVENOUS EVERY 5 MIN PRN
Status: DISCONTINUED | OUTPATIENT
Start: 2019-03-12 | End: 2019-03-12 | Stop reason: HOSPADM

## 2019-03-12 RX ORDER — SODIUM CHLORIDE 9 MG/ML
INJECTION, SOLUTION INTRAVENOUS CONTINUOUS
Status: DISCONTINUED | OUTPATIENT
Start: 2019-03-12 | End: 2019-03-16 | Stop reason: HOSPADM

## 2019-03-12 RX ORDER — SENNOSIDES 8.6 MG
3 TABLET ORAL 2 TIMES DAILY
Status: DISCONTINUED | OUTPATIENT
Start: 2019-03-12 | End: 2019-03-16 | Stop reason: HOSPADM

## 2019-03-12 RX ORDER — SODIUM CHLORIDE, SODIUM LACTATE, POTASSIUM CHLORIDE, CALCIUM CHLORIDE 600; 310; 30; 20 MG/100ML; MG/100ML; MG/100ML; MG/100ML
INJECTION, SOLUTION INTRAVENOUS CONTINUOUS
Status: DISCONTINUED | OUTPATIENT
Start: 2019-03-12 | End: 2019-03-12 | Stop reason: HOSPADM

## 2019-03-12 RX ORDER — LIDOCAINE 40 MG/G
CREAM TOPICAL
Status: DISCONTINUED | OUTPATIENT
Start: 2019-03-12 | End: 2019-03-16 | Stop reason: HOSPADM

## 2019-03-12 RX ORDER — DEXAMETHASONE SODIUM PHOSPHATE 4 MG/ML
INJECTION, SOLUTION INTRA-ARTICULAR; INTRALESIONAL; INTRAMUSCULAR; INTRAVENOUS; SOFT TISSUE PRN
Status: DISCONTINUED | OUTPATIENT
Start: 2019-03-12 | End: 2019-03-12

## 2019-03-12 RX ORDER — PROPOFOL 10 MG/ML
INJECTION, EMULSION INTRAVENOUS PRN
Status: DISCONTINUED | OUTPATIENT
Start: 2019-03-12 | End: 2019-03-12

## 2019-03-12 RX ORDER — SODIUM CHLORIDE 9 MG/ML
INJECTION, SOLUTION INTRAVENOUS
Status: COMPLETED
Start: 2019-03-12 | End: 2019-03-12

## 2019-03-12 RX ORDER — LIDOCAINE HYDROCHLORIDE 20 MG/ML
INJECTION, SOLUTION INFILTRATION; PERINEURAL PRN
Status: DISCONTINUED | OUTPATIENT
Start: 2019-03-12 | End: 2019-03-12

## 2019-03-12 RX ORDER — NALOXONE HYDROCHLORIDE 0.4 MG/ML
.1-.4 INJECTION, SOLUTION INTRAMUSCULAR; INTRAVENOUS; SUBCUTANEOUS
Status: DISCONTINUED | OUTPATIENT
Start: 2019-03-12 | End: 2019-03-12 | Stop reason: HOSPADM

## 2019-03-12 RX ORDER — ONDANSETRON 2 MG/ML
INJECTION INTRAMUSCULAR; INTRAVENOUS PRN
Status: DISCONTINUED | OUTPATIENT
Start: 2019-03-12 | End: 2019-03-12

## 2019-03-12 RX ORDER — FENTANYL CITRATE 50 UG/ML
INJECTION, SOLUTION INTRAMUSCULAR; INTRAVENOUS PRN
Status: DISCONTINUED | OUTPATIENT
Start: 2019-03-12 | End: 2019-03-12

## 2019-03-12 RX ADMIN — OXYCODONE HYDROCHLORIDE 10 MG: 5 TABLET ORAL at 23:25

## 2019-03-12 RX ADMIN — FENTANYL CITRATE 75 MCG: 50 INJECTION, SOLUTION INTRAMUSCULAR; INTRAVENOUS at 16:28

## 2019-03-12 RX ADMIN — FENTANYL CITRATE 50 MCG: 50 INJECTION INTRAMUSCULAR; INTRAVENOUS at 17:36

## 2019-03-12 RX ADMIN — COLCHICINE 0.6 MG: 0.6 TABLET, FILM COATED ORAL at 10:06

## 2019-03-12 RX ADMIN — GLYCOPYRROLATE 0.4 MG: 0.2 INJECTION, SOLUTION INTRAMUSCULAR; INTRAVENOUS at 17:09

## 2019-03-12 RX ADMIN — HYDROMORPHONE HYDROCHLORIDE 0.5 MG: 1 INJECTION, SOLUTION INTRAMUSCULAR; INTRAVENOUS; SUBCUTANEOUS at 17:44

## 2019-03-12 RX ADMIN — FENTANYL CITRATE 25 MCG: 50 INJECTION, SOLUTION INTRAMUSCULAR; INTRAVENOUS at 16:33

## 2019-03-12 RX ADMIN — DEXMEDETOMIDINE HYDROCHLORIDE 8 MCG: 100 INJECTION, SOLUTION INTRAVENOUS at 17:14

## 2019-03-12 RX ADMIN — OXYCODONE HYDROCHLORIDE 5 MG: 5 TABLET ORAL at 07:01

## 2019-03-12 RX ADMIN — VANCOMYCIN HYDROCHLORIDE 1250 MG: 10 INJECTION, POWDER, LYOPHILIZED, FOR SOLUTION INTRAVENOUS at 15:41

## 2019-03-12 RX ADMIN — ACETAMINOPHEN 1000 MG: 500 TABLET ORAL at 23:52

## 2019-03-12 RX ADMIN — SENNOSIDES 3 TABLET: 8.6 TABLET, FILM COATED ORAL at 10:06

## 2019-03-12 RX ADMIN — ONDANSETRON 4 MG: 2 INJECTION INTRAMUSCULAR; INTRAVENOUS at 03:15

## 2019-03-12 RX ADMIN — OXYCODONE HYDROCHLORIDE 10 MG: 5 TABLET ORAL at 10:50

## 2019-03-12 RX ADMIN — ONDANSETRON 4 MG: 2 INJECTION INTRAMUSCULAR; INTRAVENOUS at 18:37

## 2019-03-12 RX ADMIN — GUANFACINE 3 MG: 2 TABLET ORAL at 10:07

## 2019-03-12 RX ADMIN — PROPOFOL 200 MG: 10 INJECTION, EMULSION INTRAVENOUS at 16:28

## 2019-03-12 RX ADMIN — OXYCODONE HYDROCHLORIDE 5 MG: 5 TABLET ORAL at 00:13

## 2019-03-12 RX ADMIN — ACETAMINOPHEN 1000 MG: 500 TABLET ORAL at 02:55

## 2019-03-12 RX ADMIN — ROCURONIUM BROMIDE 50 MG: 10 INJECTION INTRAVENOUS at 16:28

## 2019-03-12 RX ADMIN — NEOSTIGMINE METHYLSULFATE 3 MG: 1 INJECTION, SOLUTION INTRAVENOUS at 17:09

## 2019-03-12 RX ADMIN — ONDANSETRON 4 MG: 2 INJECTION INTRAMUSCULAR; INTRAVENOUS at 16:59

## 2019-03-12 RX ADMIN — SODIUM CHLORIDE 1000 ML: 9 INJECTION, SOLUTION INTRAVENOUS at 02:53

## 2019-03-12 RX ADMIN — VANCOMYCIN HYDROCHLORIDE 1250 MG: 10 INJECTION, POWDER, LYOPHILIZED, FOR SOLUTION INTRAVENOUS at 16:45

## 2019-03-12 RX ADMIN — SODIUM CHLORIDE, POTASSIUM CHLORIDE, SODIUM LACTATE AND CALCIUM CHLORIDE: 600; 310; 30; 20 INJECTION, SOLUTION INTRAVENOUS at 16:22

## 2019-03-12 RX ADMIN — FENTANYL CITRATE 50 MCG: 50 INJECTION INTRAMUSCULAR; INTRAVENOUS at 17:41

## 2019-03-12 RX ADMIN — GUANFACINE 3 MG: 2 TABLET ORAL at 22:46

## 2019-03-12 RX ADMIN — LIDOCAINE HYDROCHLORIDE 80 MG: 20 INJECTION, SOLUTION INFILTRATION; PERINEURAL at 16:28

## 2019-03-12 RX ADMIN — ONDANSETRON 4 MG: 4 TABLET, ORALLY DISINTEGRATING ORAL at 22:46

## 2019-03-12 RX ADMIN — OXYCODONE HYDROCHLORIDE 10 MG: 5 TABLET ORAL at 20:08

## 2019-03-12 RX ADMIN — AMITRIPTYLINE HYDROCHLORIDE 50 MG: 50 TABLET, FILM COATED ORAL at 22:52

## 2019-03-12 RX ADMIN — OXYCODONE HYDROCHLORIDE 5 MG: 5 TABLET ORAL at 03:15

## 2019-03-12 RX ADMIN — DEXAMETHASONE SODIUM PHOSPHATE 4 MG: 4 INJECTION, SOLUTION INTRAMUSCULAR; INTRAVENOUS at 16:58

## 2019-03-12 RX ADMIN — HYDROMORPHONE HYDROCHLORIDE 0.5 MG: 1 INJECTION, SOLUTION INTRAMUSCULAR; INTRAVENOUS; SUBCUTANEOUS at 18:07

## 2019-03-12 RX ADMIN — VANCOMYCIN HYDROCHLORIDE 1250 MG: 10 INJECTION, POWDER, LYOPHILIZED, FOR SOLUTION INTRAVENOUS at 02:52

## 2019-03-12 RX ADMIN — HYDROMORPHONE HYDROCHLORIDE 0.5 MG: 1 INJECTION, SOLUTION INTRAMUSCULAR; INTRAVENOUS; SUBCUTANEOUS at 17:56

## 2019-03-12 RX ADMIN — DIAZEPAM 5 MG: 5 INJECTION, SOLUTION INTRAMUSCULAR; INTRAVENOUS at 18:16

## 2019-03-12 RX ADMIN — DEXMEDETOMIDINE HYDROCHLORIDE 8 MCG: 100 INJECTION, SOLUTION INTRAVENOUS at 17:05

## 2019-03-12 RX ADMIN — Medication 0.3 MG: at 08:59

## 2019-03-12 RX ADMIN — PROPOFOL 50 MG: 10 INJECTION, EMULSION INTRAVENOUS at 17:11

## 2019-03-12 ASSESSMENT — ENCOUNTER SYMPTOMS: SEIZURES: 1

## 2019-03-12 ASSESSMENT — ACTIVITIES OF DAILY LIVING (ADL)
ADLS_ACUITY_SCORE: 14

## 2019-03-12 NOTE — PLAN OF CARE
VS: Stable except tachy   O2: RA   Output: adequate   Last BM: 3/7/19   Activity: Up independent using crutches   Skin: Intact except left ankle wounf   Pain: Tolerable with Roxicodone 10 mg po   CMS: C/o numbness/tingling left foot   Dressing: Dressed per POC   Diet: NPO   LDA: IV TKO   Equipment: IV pole, crutches   Plan: Surgery this afternoon   Additional Info: Pre-op check list started. First technicare  wash done. NPO since this morning except sips of water for meds. Declined some medications. Pt will take some  home bowel medications. Parents and boyfriend here. Mom -Jinny here during surgery.  Will cont. To monitor.

## 2019-03-12 NOTE — ANESTHESIA PREPROCEDURE EVALUATION
Anesthesia Pre-Procedure Evaluation    Patient: Samara Oropeza   MRN:     [id number] Gender:   female   Age:    23 year old :      1995        Preoperative Diagnosis: Left foot abscess   Procedure(s):  COMBINED IRRIGATION AND DEBRIDEMENT LEFT FOOT     Past Medical History:   Diagnosis Date     Anemia      Anxiety      Arthritis      Behcet's disease (H)      Cervical adenitis May 2010     Chronic abdominal pain      Constipation, chronic 1994     Fibromyalgia      Gastro-oesophageal reflux disease      Gastroparesis      Irregular heart beat     tachycardia, has had workup     Migraines      Neuromuscular disorder (H)     fibramyalgia     Palpitations      Seizure (H)      Seizures (H)     unknown etiology     Syncope      Tourette's       Past Surgical History:   Procedure Laterality Date     ARTHROSCOPY ANKLE, REPAIR LIGAMENT Left 2019    Procedure: Ankle arthroscopy and sinus tarsi evacuation, ligament repair, left lower extremity;  Surgeon: Andres Johnson DPM;  Location:  OR     ARTHROSCOPY KNEE WITH PATELLAR REALIGNMENT  2013    Procedure: ARTHROSCOPY KNEE WITH PATELLAR REALIGNMENT;  Left Knee Arthroscopy, Medial Patellofemoral Ligament Reconstruction with Allograft  ;  Surgeon: Jennifer Acevedo MD;  Location:  OR     COLONOSCOPY       DENTAL SURGERY      Teeth removal     ENDOSCOPY UPPER, COLONOSCOPY, COMBINED       HC ESOPH/GAS REFLUX TEST W NASAL IMPED >1 HR N/A 2/15/2017    Procedure: ESOPHAGEAL IMPEDENCE FUNCTION TEST WITH 24 HOUR PH GREATER THAN 1 HOUR;  Surgeon: Timothy Matta MD;  Location: [facility]          Anesthesia Evaluation     . Pt has had prior anesthetic.     No history of anesthetic complications          ROS/MED HX    ENT/Pulmonary:  - neg pulmonary ROS     Neurologic:     (+)seizures     Cardiovascular:  - neg cardiovascular ROS       METS/Exercise Tolerance:     Hematologic:  - neg hematologic  ROS       Musculoskeletal:  - neg  musculoskeletal ROS       GI/Hepatic: Comment: Intermittent nausea    (+) GERD       Renal/Genitourinary:  - ROS Renal section negative       Endo:  - neg endo ROS       Psychiatric:     (+) psychiatric history anxiety, depression and other (comment) (tourettes)      Infectious Disease:  - neg infectious disease ROS       Malignancy:         Other:                         PHYSICAL EXAM:   Mental Status/Neuro: A/A/O   Airway: Facies: Feasible  Mallampati: II  Mouth/Opening: Full  TM distance: > 6 cm  Neck ROM: Full   Respiratory: Auscultation: CTAB     Resp. Rate: Normal     Resp. Effort: Normal      CV:    Comments:      Dental:  Dental Comments: Multiple chipped teeth in back per patient                Lab Results   Component Value Date    WBC 4.3 03/11/2019    HGB 10.8 (L) 03/11/2019    HCT 33.6 (L) 03/11/2019     03/11/2019    CRP 29.4 (H) 03/11/2019    SED 13 12/21/2017     03/11/2019    POTASSIUM 4.0 03/11/2019    CHLORIDE 109 03/11/2019    CO2 24 03/11/2019    BUN 7 03/11/2019    CR 0.70 03/11/2019     (H) 03/11/2019    SHANKAR 8.2 (L) 03/11/2019    MAG 1.7 11/26/2016    ALBUMIN 3.3 (L) 03/09/2019    PROTTOTAL 7.0 03/09/2019    ALT 26 03/09/2019    AST 20 03/09/2019    GGT 34 (H) 10/19/2013    ALKPHOS 74 03/09/2019    BILITOTAL 0.4 03/09/2019    LIPASE 101 04/18/2018    AMYLASE 63 01/31/2007    INR 0.99 11/06/2016    TSH 1.06 10/30/2018    T4 1.26 01/31/2007    HCG Negative 01/02/2019    HCGS Negative 06/02/2017       Preop Vitals  BP Readings from Last 3 Encounters:   03/12/19 134/87   03/07/19 126/78   02/14/19 128/81    Pulse Readings from Last 3 Encounters:   03/12/19 86   02/14/19 106   02/05/19 128      Resp Readings from Last 3 Encounters:   03/12/19 16   01/22/19 12   01/15/19 18    SpO2 Readings from Last 3 Encounters:   03/12/19 99%   02/05/19 99%   01/09/19 100%      Temp Readings from Last 1 Encounters:   03/12/19 36.6  C (97.8  F) (Oral)    Ht Readings from Last 1 Encounters:  "  03/09/19 1.6 m (5' 3\")      Wt Readings from Last 1 Encounters:   03/09/19 74.3 kg (163 lb 14.4 oz)    Estimated body mass index is 29.03 kg/m  as calculated from the following:    Height as of this encounter: 1.6 m (5' 3\").    Weight as of this encounter: 74.3 kg (163 lb 14.4 oz).     LDA:  Peripheral IV 03/09/19 Right Upper forearm (Active)   Site Assessment WDL 3/11/2019 12:00 PM   Line Status Saline locked 3/11/2019 12:00 PM   Phlebitis Scale 0-->no symptoms 3/11/2019 12:00 PM   Infiltration Scale 0 3/11/2019 12:00 PM   Infiltration Site Treatment Method  None 3/11/2019 12:00 PM   Extravasation? No 3/11/2019 12:00 PM   Dressing Intervention New dressing  3/9/2019 11:15 AM   Number of days: 3            Assessment:   ASA SCORE: 2    NPO Status: > 2 hours since completed Clear Liquids; > 6 hours since completed Solid Foods   Documentation: H&P complete; Preop Testing complete; Consents complete   Proceeding: Proceed without further delay  Tobacco Use:  NO Active use of Tobacco/UNKNOWN Tobacco use status     Plan:   Anes. Type:  General   Pre-Induction: Midazolam IV; Acetaminophen PO   Induction:  IV (Standard)   Airway: Oral ETT   Access/Monitoring: PIV   Maintenance: Balanced   Emergence: Procedure Site   Logistics: Same Day Surgery     Postop Pain/Sedation Strategy:  Standard-Options: Opioids PRN     PONV Management:  Adult Risk Factors: Female, Non-Smoker, Postop Opioids  Prevention: Ondansetron; Dexamethasone     CONSENT: Direct conversation   Plan and risks discussed with: Patient          Comments for Plan/Consent:  Discussed risks, benefits, and alternatives of general anesthesia with the patient. Risks discussed included nausea/vomiting, sore throat, dental damage, soft tissue damage, problems with heart, brain, lungs, and rare allergic reactions. Patient was given a chance to ask questions. Patient consents to procedure.                            Shelly Ulloa MD  "

## 2019-03-12 NOTE — OP NOTE
Orthopedic Operative Report    03/12/19     Preoperative Diagnosis:  Left foot abscess    Postoperative Diagnosis:  Left foot abscess    Procedure Performed:    1.  Irrigation and excisional debridement left foot abscess, measuring 7.5cm x 4cm, extending down to bone.  2.  Removal of hardware, deep, left foot    Attending Surgeon: Micha Glover MD    Resident Surgeon(s): Nelson Montenegro MD    Implants: None    Findings: Gross purulence present in the prior surgical site, anterolateral capsule of the tibiotalar joint and subtalar joint had been destroyed by infection, no remaining capsule along the anterior lateral tibiotalar joint and lateral subtalar joint    Specimens: Left foot abscess    Estimated Blood Loss: 10 cc    Anesthesia: General with LMA    Indication for operation: The patient is a 23-year-old female who recently had a lateral ankle ligamentous reconstruction performed at an outside hospital by podiatry service.  She presented several days ago to the [facility] with evidence concerning for surgical site infection.  Patient was admitted to the internal medicine service and placed on IV antibiotics.  She has been treated for the last several days of IV antibiotics, however she has failed to improve.  An ultrasound was done today which did show large fluid collection consistent with an abscess.  We discussed with the patient and her family the risks benefits and alternatives of the above surgery.  They wish to proceed.    Description of Procedure:    The patient was met in the preoperative holding area and the operative extremity was confirmed and marked.  The patient was taken back to the operating supine position.  She was induced under general anesthesia.  The left lower extremity was prepped and draped in the usual sterile fashion.  A preoperative timeout was performed.    The patient's prior surgical site was incised, and gross purulence was immediately encountered.  This was sent for culture.   We noted that the site of the abscess extended down to the level of the tibiotalar joint and subtalar joint, both of which were completely lacking capsule along the anteromedial aspect and lateral aspect of the tibiotalar and subtalar joints, respectively.  There were retained sutures which had been placed at the time of the ligamentous reconstruction, however due to the infection causing tissue damage, they were no longer holding the tissues together.  As they were braided sutures, we removed them to eliminate risk of subsequent infection.    We debrided all nonviable and infected-appearing appearing tissue sharply with a knife down to the level of bone, and then thoroughly irrigated the sites including the tibiotalar and subtalar joints with 1 L of fluid using a bulb syringe.    We next placed a 7 Dominican SARY drain, and then loosely closed the skin with 2-0 nylon.  Sterile dressings were applied.  The patient was placed in a posterior supporting splint given the significant subtalar instability present secondary to loss of lateral subtalar capsule.       The patient was allowed to emerge from anesthesia which occurred without incident and was transferred to PACU in stable condition.    Postop plan:  Nonweightbearing left lower extremity.  Continue antibiotics per infectious disease and internal medicine recommendations.  No plans for further debridement.  Remove drain with minimal output.  Follow-up for wound check in 1-2 weeks.    Dr. Glover was present and scrubbed for all critical portions of the case.

## 2019-03-12 NOTE — OR NURSING
PACU to Inpatient Nursing Handoff    Patient Samara Oropeza is a 23 year old female who speaks English.   Procedure Procedure(s):  COMBINED IRRIGATION AND DEBRIDEMENT LEFT ANKLE   Surgeon(s) Primary: Micha Glover MD  Resident - Assisting: Nelson Montenegro MD     Allergies   Allergen Reactions     Amoxil [Penicillins] Rash     Dad unsure of reaction.     Bee Venom Anaphylaxis     Contrast Dye Rash     Contrast Media [place] [facility] [place] Contrast Media [place] [facility] [place].  NOTE: this is a contrast media oral with iodine. Premedicate with methylpred standard for IV contrast, request barium contrast for oral contrast.     Kiwi Swelling     Orange Fruit [Citrus] Anaphylaxis     Pineapple Anaphylaxis, Difficulty breathing and Rash     Reglan [Metoclopramide] Other (See Comments)     IV dose only, in ER, rapid heart rate.     Ace Inhibitors      Difficulty in breathing and GI upset     Amitiza [Lubiprostone] Nausea and Vomiting     Amoxicillin-Pot Clavulanate      Latex      Midazolam Unknown     Other reaction(s): Unknown  parent states that when pt takes this medication, she wakes up being very violent .  parent states that when pt takes this medication, she wakes up being very violent .     No Clinical Screening - See Comments      Bleech/ chest tightness, itchy throat, swollen tongue, hives     Versed      Coming out of pelvic exam at age of 5, was kicking and screaming when coming out of the versed.     Adhesive Tape Rash     Azithromycin Hives and Rash     Cephalexin Itching and Rash     Itchy mouth  Itchy mouth     Keflex [Cephalexin-Fd&C Yellow #6] Hives       Isolation  [unfilled]     Past Medical History   has a past medical history of Anemia, Anxiety, Arthritis, Behcet's disease (H), Cervical adenitis (May 2010), Chronic abdominal pain, Constipation, chronic (1994), Fibromyalgia, Gastro-oesophageal reflux disease, Gastroparesis, Irregular heart beat, Migraines, Neuromuscular  disorder (H), Palpitations, Seizure (H), Seizures (H), Syncope, and Tourette's. She also has no past medical history of Chronic infection, History of blood transfusion, Malignant hyperthermia, or Sleep apnea.    Anesthesia General   Dermatome Level     Preop Meds Not applicable   Nerve block Not applicable   Intraop Meds dexmedetomidine (Precedex): 16 mcg total  fentanyl (Sublimaze): 100 mcg total  ondansetron (Zofran): last given at 1659   Local Meds No   Antibiotics vancomycin (Vancocin) - last given at 1645     Pain Patient Currently in Pain: yes  Comfort: tolerable with discomfort  Pain Control: partially effective   PACU meds  fentanyl (Sublimaze): 100 mcg (total dose) last given at 1741   hydromorphone (Dilaudid): 1.5 mg (total dose) last given at 1807   Valium 5mg IV   PCA / epidural No   Capnography     Telemetry ECG Rhythm: Sinus tachycardia   Inpatient Telemetry Monitor Ordered? No        Labs Glucose Lab Results   Component Value Date     03/11/2019       Hgb Lab Results   Component Value Date    HGB 10.8 03/11/2019       INR Lab Results   Component Value Date    INR 0.99 11/06/2016      PACU Imaging Not applicable     Wound/Incision Wound 03/09/19 Distal Foot Other (comment) Purulent drainage from surgical incisional site.  (Active)   Site Assessment Edema 3/12/2019  9:00 AM   Era-wound Assessment Edema 3/12/2019  3:14 AM   Drainage Amount None 3/12/2019  3:14 AM   Drainage Color/Charcteristics Serous 3/12/2019  9:00 AM   Wound Care/Cleansing Other (Comment) 3/11/2019  7:44 PM   Dressing Dry gauze;Moist gauze 3/11/2019  7:44 PM   Dressing Status Clean, dry, intact 3/12/2019  3:14 AM   Dressing Change Due 03/12/19 3/12/2019  3:14 AM   Number of days: 3       Incision/Surgical Site 01/02/19 Left Ankle (Active)   Number of days: 69       Incision/Surgical Site 03/12/19 Left;Lateral Ankle (Active)   Incision Assessment UTV 3/12/2019  6:00 PM   Closure Approximated;Sutures 3/12/2019  5:23 PM    Dressing Intervention Clean, dry, intact 3/12/2019  6:00 PM   Number of days: 0      CMS        Equipment ice pack   Other LDA       IV Access Peripheral IV 03/09/19 Right Upper forearm (Active)   Site Assessment WDL 3/12/2019  6:00 PM   Line Status Infusing 3/12/2019  6:00 PM   Phlebitis Scale 0-->no symptoms 3/12/2019  6:00 PM   Infiltration Scale 0 3/12/2019  6:00 PM   Infiltration Site Treatment Method  None 3/11/2019 12:00 PM   Extravasation? No 3/11/2019 12:00 PM   Dressing Intervention New dressing  3/9/2019 11:15 AM   Number of days: 3      Blood Products Not applicable EBL 10 mL   Intake/Output Date 03/12/19 0700 - 03/13/19 0659   Shift 7839-8410 7254-9145 2235-1223 24 Hour Total   INTAKE   I.V.  800  800   Shift Total(mL/kg)  800(10.76)  800(10.76)   OUTPUT   Blood  10  10   Shift Total(mL/kg)  10(0.13)  10(0.13)   Weight (kg) 74.34 74.34 74.34 74.34      Drains / Wilson Closed/Suction Drain 1 Left;Lateral Foot Bulb 7 Lithuanian (Active)   Site Description UTV 3/12/2019  6:00 PM   Dressing Status Normal: Clean, Dry & Intact 3/12/2019  6:00 PM   Drainage Appearance Bloody/Bright Red 3/12/2019  6:00 PM   Status To bulb suction 3/12/2019  6:00 PM   Number of days: 0      Time of void PreOp Void Prior to Procedure: 1500 (03/12/19 1524)    PostOp Voided (mL): 250 mL (03/10/19 1400)  Urine Occurrence: 1 (03/10/19 1222)    Diapered? No   Bladder Scan     PO 1400 mL (03/10/19 1600)  ice chips     Vitals    B/P: (!) 140/94  T: 97.9  F (36.6  C)    Temp src: Oral  P:  Pulse: 109 (03/12/19 1815)    Heart Rate: 102 (03/12/19 1815)     R: 21  O2:  SpO2: 100 %    O2 Device: Nasal cannula (03/12/19 1815)    Oxygen Delivery: 2 LPM (03/12/19 1815)         Family/support present mother and significant other   Patient belongings     Patient transported on cart   DC meds/scripts (obs/outpt) Not applicable   Inpatient Pain Meds Released? No       Special needs/considerations None   Tasks needing completion Mike Bolanos  JESSY Mcgee  ASCOM [id number]

## 2019-03-12 NOTE — OR NURSING
Patient anxious and feels like she is having a panic attack. Dr Ulloa at bedside and Valosvaldo ordered.

## 2019-03-12 NOTE — PROGRESS NOTES
"INFECTIOUS DISEASE FOLLOW-UP    NAME: Samara Oropeza  MRN:  [id number]  AGE:  23 year old            :  1995    PRIMARY CARE PROVIDER:  Sonja Abreu  Date of Admission:   3/9/2019  Consult Requester:  Duane Ma MD      REASON FOR SEEING: follow-up for left foot infection    INTERVAL EVENTS:   1. Ultrasound today is consistent with the presence of an abscess: it demonstrated \"a walled off fluid accumulation on the dorsum of the right [left -D Bejarano] foot which measures approximately 2.2 x 1.4 x 1.8 cm. A tract is seen extending to the skin surface on the dorsum of the foot. There is moderate diffuse edema on the dorsum of the foot.\"  2. No notable clinical improvement on IV vancomycin    IMPRESSION AND SUGGESTIONS:    Staphylococcus aureus cellulitis and abscess. Coagulase-negative Staphylococcus was also isolated from the initial culture. The patient should undergo drainage given the lack of response to antibiotics alone.     The patient is likely to require approximately two weeks of IV vancomycin following drainage of the abscess for the treatment of a staphylococcal soft tissue infection depending upon her clinical response. However, if the abscess is adjacent to bone, the patient could have very early osteomyelitis. An MRI in approximately 2 weeks would be worthwhile to evaluate for osteomyelitis.    Suggest:  1. Continue IV vancomycin  2. Given her difficult IV access, she should have a PICC line placed.  3. Follow-up MRI: Toward the end of her course of antibiotics for the cellulitis and soft tissue infection (following drainage of the abscess), the patient should undergo an MRI to evaluate the foot and ankle for the possibility of osteomyelitis. An MRI at this time would be unlikely to demonstrate findings consistent with osteomyelitis given the relatively short period that the patient has had this infection    The medical record includes allergies to both penicillins (possibly a " rash, patient's father was uncertain) and cephalosporins (hives). As a result, she is likely to require IV vancomycin (or, if needed, daptomycin)    ROS is positive for intermittent chest discomfort that is difficult for me to assess.    ALLERGIES:  Amoxil [penicillins]; Bee venom; Contrast dye; Kiwi; Orange fruit [citrus]; Pineapple; Reglan [metoclopramide]; Ace inhibitors; Amitiza [lubiprostone]; Amoxicillin-pot clavulanate; Latex; Midazolam; No clinical screening - see comments; Versed; Adhesive tape; Azithromycin; Cephalexin; and Keflex [cephalexin-fd&c yellow #6]    CURRENT MEDICATIONS:  Current Facility-Administered Medications   Medication     acetaminophen (TYLENOL) tablet 1,000 mg     albuterol (PROAIR HFA/PROVENTIL HFA/VENTOLIN HFA) 108 (90 Base) MCG/ACT inhaler 2 puff     amitriptyline (ELAVIL) tablet 50 mg     artificial tears ophthalmic ointment     benzocaine (ORAJEL MAXIMUM STRENGTH) 20 % gel     colchicine (COLCYRS) tablet 0.6 mg     cyproheptadine (PERIACTIN) tablet 4 mg     dicyclomine (BENTYL) tablet 20 mg     diphenhydrAMINE (BENADRYL) capsule 25-50 mg     guanFACINE (TENEX) tablet 3 mg     HYDROmorphone (PF) (DILAUDID) injection 0.3-0.5 mg     hydrOXYzine (ATARAX) tablet 25-50 mg     hypromellose (ARTIFICIAL TEARS) 0.5 % ophthalmic solution 1 drop     lactase (LACTAID) tablet 3,000 Units     lactulose (CHRONULAC) solution 20 g     lactulose (CHRONULAC) solution 30 mL     linaclotide (LINZESS) capsule 145 mcg     magic mouthwash suspension (diphenhydrAMINE, lidocaine, aluminum-magnesium & simethicone)     melatonin tablet 1 mg     naloxone (NARCAN) injection 0.1-0.4 mg     naloxone (NARCAN) injection 0.1-0.4 mg     nitroFURantoin macrocrystal-monohydrate (MACROBID) capsule 100 mg     norgestimate-ethinyl estradiol (ORTHO-CYCLEN/SPRINTEC) 0.25-35 MG-MCG per tablet 1 tablet     ondansetron (ZOFRAN-ODT) ODT tab 4 mg    Or     ondansetron (ZOFRAN) injection 4 mg     oxyCODONE (ROXICODONE) tablet 5-10  mg     polyethylene glycol (MIRALAX/GLYCOLAX) Packet 17 g     sennosides (SENOKOT) tablet 3 tablet     sennosides (SENOKOT) tablet 8.6 mg     sodium chloride 0.9% infusion     sucralfate (CARAFATE) suspension 1 g     vancomycin (VANCOCIN) 1,250 mg in sodium chloride 0.9 % 250 mL intermittent infusion       REVIEW OF SYSTEMS:  Negative for fever or chills. Positive for intermittent chest discomfort. Positive for constipation and abdominal discomfort. Positive for decrease in sensation of the left toes. Positive for left foot pain. Otherwise a 10-point ROS is negative.    LABORATORY RESULTS:  Inflammatory Markers    Recent Labs   Lab Test 03/11/19  0713 03/10/19  0643 03/09/19  1120 04/18/18  1724 12/21/17  1821 04/21/17  1249 04/14/17  1351 11/06/16  1341 03/11/16  1350 11/18/15  1453 11/14/15  1300 05/06/15  0220 12/31/14  1932 12/08/14  2140   SED  --   --   --   --  13  --   --  4 5 6 6 6 6 10   CRP 29.4* 51.8* 84.0* <2.9 12.0* <2.9 <2.9 <2.9 <2.9 <2.9 <2.9 <2.9 <2.9 8.3*       Hematology Studies    Recent Labs   Lab Test 03/11/19  0713 03/10/19  1734 03/10/19  0643 03/09/19  1120 12/12/18  1527 10/30/18  1606 06/01/18  1208 04/18/18  1724 01/17/18  1610 01/08/18  1550 12/21/17  1821   WBC 4.3  --  5.3 6.6 4.5 4.5 4.7 5.0 5.8 5.1 3.9*   ANEU  --   --   --  4.6  --  2.3  --  2.2 2.7 2.5 1.4*   AEOS  --   --   --  0.1  --  0.1  --  0.1 0.1 0.1 0.1   HGB 10.8* 11.2* 11.4* 12.4 12.2 12.7 11.9 11.7 11.9 10.9* 12.5   MCV 90  --  91 92 88 89 87 87 88 88 84     --  185 211 236 213 257 215 243 243 272       Immune Globulin Studies    Recent Labs   Lab Test 01/17/18  1610   IGA 97       Metabolic Studies     Recent Labs   Lab Test 03/11/19  0713 03/09/19  1120 12/12/18  1527 10/30/18  1606 04/18/18  1724    136 137 138 142   POTASSIUM 4.0 3.9 3.9 4.0 3.9   CHLORIDE 109 104 105 106 108   CO2 24 23 24 24 24   BUN 7 11 8 13 11   CR 0.70 0.77 0.67 0.79 0.77   GFRESTIMATED >90 >90 >90 89 >90       Hepatic Studies     Recent Labs   Lab Test 03/09/19  1120 12/12/18  1527 10/30/18  1606 04/18/18  1724 01/08/18  1440 11/03/17  1114   BILITOTAL 0.4 0.3 0.3 0.5 0.4 0.3   ALKPHOS 74 55 69 81 91 99   ALBUMIN 3.3* 3.7 3.7 4.0 3.8 4.0   AST 20 34 34 26 26 34   ALT 26 33 35 28 34 40       Thyroid Studies    Recent Labs   Lab Test 10/30/18  1606 11/26/16  1440   TSH 1.06 0.87       Microbiology:  Culture Micro   Date Value Ref Range Status   03/09/2019 (A)  Preliminary    Moderate growth  Staphylococcus aureus  Additional susceptibilities in progress  3/12/19     03/09/2019 (A)  Preliminary    Moderate growth  Coagulase negative Staphylococcus  Susceptibility testing not routinely done     12/05/2018 (A)  Final    >100,000 colonies/mL  Staphylococcus saprophyticus     10/31/2018 No growth  Final   09/06/2018 (A)  Final    50,000 to 100,000 colonies/mL  Staphylococcus aureus     09/06/2018 (A)  Final    50,000 to 100,000 colonies/mL  Coagulase negative Staphylococcus     11/29/2016 No Beta Streptococcus isolated  Final   04/19/2013 No Beta Streptococcus isolated  Final   01/01/2012   Final    Moderate growth Beta hemolytic Streptococcus group A  Critical Value called to and read back by Dr. Kuo (URPER) 1059 1/2/12,hg   05/20/2011 No Beta Streptococcus isolated  Final       Urine Studies    Recent Labs   Lab Test 12/05/18  1348 10/31/18  1456 09/06/18  1345 04/18/18  1641 01/08/18  1553 11/03/17  1239   LEUKEST Small*  --  Small* Negative Negative Negative   WBCU 25-50* 10-25* 25-50* 1 0  --        Vancomycin Levels    Recent Labs   Lab Test 03/12/19  0316   VANCOMYCIN 9.0     Hepatitis B Testing   Recent Labs   Lab Test 01/11/16  1218   HBCAB Nonreactive   HEPBANG Nonreactive     Hepatitis C Testing     Hepatitis C Antibody   Date Value Ref Range Status   01/11/2016  NR Final    Nonreactive   Assay performance characteristics have not been established for newborns,   infants, and children         Vitals:    03/11/19 1536 03/11/19 1701  19 0322 19 0845   BP: 110/64 108/70 115/77 134/87   BP Location: Left arm Left arm Left arm Left arm   Pulse: 83 84 86    Resp: 18 18 16 16   Temp: 98  F (36.7  C) 97.5  F (36.4  C) 97.6  F (36.4  C) 97.8  F (36.6  C)   TempSrc: Oral Oral Oral Oral   SpO2: 98% 99% 100% 99%   Weight:       Height:         Temp (high/low/average during past 24 hours): Temp (24hrs), Av.7  F (36.5  C), Min:97.5  F (36.4  C), Max:98  F (36.7  C)      PHYSICAL EXAMINATION:  Vital signs as above  General: Pleasant young woman lying in bed. Her mother and her stepfather are in the room with her.  HEENT: NCAT. EOMI. No conjunctival hemorrhage. No scleral icterus. No evidence of oral thrush on exam.  Neck: Supple without adenopathy  Back: No costovertebral angle tenderness or spinal tenderness.   Lungs: Clear to auscultation  Cardiac: RRR S1S2 with a grade I/VI systolic murmur best heard at the left sternal border  Abdomen: Normal bowel sounds, soft, non-tender  Extremities: No femoral lymph nodes were palpable on either the right or the left. Left lower extremity with a more bronze than pink color of the dorsum of the foot, notable swelling of the dorsum of the foot to the ankle, and tenderness of the dorsum of the foot and ankle. An approximately 2 mm area on the dorsum of the foot has purulent drainage. Able to move all four extremities. She did not move her left ankle as a result of pain.  Neuro: AOX3. Vibration sense (128 Hz tuning fork) is present on the toes of the left foot and the dorsum of the left foot. It may be decreased, though I found it difficult to assess with the patient.    Electronically signed by Duane Bejarano M.D.  3/12/2019 12:15 PM  --

## 2019-03-12 NOTE — PLAN OF CARE
VS: Stable.   O2: RA.   Output: Voiding independently in bathroom.   Last BM: 3/7/19, passing flatus, BS active.   Activity: Independent in room with crutches.   Skin: Bruises on left arm, redness and edema of left foot, marked.   Pain: Burning, throbbing, sharp pain in left foot managed with oxycodone, scheduled tylenol, and ice.   CMS: Numbness in left foot.   Dressing: CDI, refused ACE wrap.   Diet: Regular, zofran given with vanco to prevent nausea.   LDA: PIV right lower forearm infusing NS TKO between abx. Do not SL pt, she is a hard stick.   Equipment: Crutches, IV pole, pillows, call light within reach.   Plan: Continue to monitor.   Additional Info:

## 2019-03-12 NOTE — ANESTHESIA CARE TRANSFER NOTE
Patient: Samara Oropeza    Procedure(s):  COMBINED IRRIGATION AND DEBRIDEMENT LEFT ANKLE    Diagnosis: Left foot abscess  Diagnosis Additional Information: No value filed.    Anesthesia Type:   No value filed.     Note:  Airway :Nasal Cannula  Patient transferred to:PACU  Comments: Report to RN, vss, IV and airway patent, awake, exchanging well on [id number] Report: Identifed the Patient, Identified the Reponsible Provider, Reviewed the pertinent medical history, Discussed the surgical course, Reviewed Intra-OP anesthesia mangement and issues during anesthesia, Set expectations for post-procedure period and Allowed opportunity for questions and acknowledgement of understanding      Vitals: (Last set prior to Anesthesia Care Transfer)    CRNA VITALS  3/12/2019 1701 - 3/12/2019 1731      3/12/2019             Pulse:  117    Ht Rate:  116    SpO2:  95 %    Resp Rate (observed):  3  (Abnormal)                 Electronically Signed By: EVI Astudillo CRNA  March 12, 2019  5:31 PM

## 2019-03-12 NOTE — PROGRESS NOTES
"Orthopaedic Surgery Progress Note   March 11, 2019    Subjective: Pain wrose today, feels the cellulitis is spreading. Remains afebrile. NPO pending evaluation this AM. Tolerated foot soaks yesterday.     Objective: /77 (BP Location: Left arm)   Pulse 86   Temp 97.6  F (36.4  C) (Oral)   Resp 16   Ht 1.6 m (5' 3\")   Wt 74.3 kg (163 lb 14.4 oz)   LMP 03/09/2019   SpO2 100%   BMI 29.03 kg/m      General: NAD, alert and oriented, cooperative with exam.   Cardio: RRR, extremities wwp.   Respiratory: Non-labored breathing.  MSK: LLE: Toes wwp, DP 2+, bcr in all toes. Erythema remains within outline from last night, but significant swelling present, possible fluctuance present. Tolerates passive ankle ROM including dorsi and plantarflexion, eversion and inversion. +EHL/FHL, patient is hesitant to demonstrate TA and GSC but is able to with encouragement. SILT SP/DP/Sa/Cat/T. Dressing with serous drainage. Fibrinous tissue at the wound dehiscence but no purulence. No fluid expressible from the wound.  Non-tender to palpation of the medial tibiotalar joint line.     3/11 Labs:   WBC 4.3  Hgb 10.8  Plts 200  CRP 84 -> 51.8 -> 29.4 (3/11)  ED Wound Cx: Staph aureus.     Assessment and Plan: Samara Oropeza is a 23 year old female with PMH including anxiety, Behcet's disease, fibromyalgia, GERD, migraines, seizure disorder, Tourette's and s/p L ankle arthroscopy and ATFL repair on 1/2 by Dr. Johnson presenting with increasing erythema, drainage and pain surrounding her surgical incision, found to have cellulitis.     - Due to worsening despite antibiotics, will order ultrasound to evaluate for abscess.     - NPO while awaiting results           Internal Medicine Primary.  Activity: Up with assist until independent.  Weight bearing status: WBAT with CAM boot as instructed by her surgeon.   Pain management: Per primary.    Antibiotics/Tetanus: Per primary. Currently Vancomycin.   Anticoagulation/DVT " prophylaxis: Per primary.    Imaging: No further imaging needed at this time.   Labs: Monitor inflammatory markers.  Bracing/Splinting: CAM boot as instructed by her surgeon.    Dressings: Keep clean, dry and intact - recommend covering wound with non-adhesive dressing such as adaptic, gauze and ACE bandage. BID dilute betadine soaks   Elevation: Elevate LLE on pillows to keep above the level of the heart as much as possible.   Cultures: Staph aureus, susceptibility pending  Follow-up: Clinic with Dr. Johnson within 1 week.           Nelson Montenegro MD  Orthopaedic Surgery PGY-5  Pager:  [phone]       For questions about this patient during weekday business hours, please attempt to contact me at my pager prior to contacting the Orthopaedic Surgery resident on call. On the weekends and overnight, please page the Orthopaedic Surgery resident on call. Thank you!

## 2019-03-12 NOTE — PHARMACY-VANCOMYCIN DOSING SERVICE
Pharmacy Vancomycin Note  Date of Service 2019  Patient's  1995   23 year old, female    Indication: Skin and Soft Tissue Infection   Cx: Staph aureus and coagulase negative Staph   CRP 29.4, trending down   Goal Trough Level: 10-15 mg/L  Day of Therapy: Started 3/10  Current Vancomycin regimen:  1250 mg IV q12h    Current estimated CrCl = Estimated Creatinine Clearance: 119.7 mL/min (based on SCr of 0.7 mg/dL).    Creatinine for last 3 days  3/9/2019: 11:20 AM Creatinine 0.77 mg/dL  3/11/2019:  7:13 AM Creatinine 0.70 mg/dL    Recent Vancomycin Levels (past 3 days)  3/12/2019:  3:16 AM Vancomycin Level 9.0 mg/L    Vancomycin IV Administrations (past 72 hours)                   vancomycin (VANCOCIN) 1,250 mg in sodium chloride 0.9 % 250 mL intermittent infusion (mg) 1,250 mg New Bag 19 0252     1,250 mg New Bag 19 0410     1,250 mg New Bag 03/10/19 1550                Nephrotoxins and other renal medications (From now, onward)    Start     Dose/Rate Route Frequency Ordered Stop    03/10/19 1500  vancomycin (VANCOCIN) 1,250 mg in sodium chloride 0.9 % 250 mL intermittent infusion      1,250 mg  over 90 Minutes Intravenous EVERY 12 HOURS 03/10/19 1447               Contrast Orders - past 72 hours (72h ago, onward)    None          Interpretation of levels and current regimen:  Trough level is  Subtherapeutic    Has serum creatinine changed > 50% in last 72 hours: No    Urine output:  unable to determine    Renal Function: Stable    Plan:  1.  Continue Current Dose 1250 mg q12h (16.8 mg/kg). Pt has been losing IV access so has only been receiving one dose per day. Trough of 9 was drawn 20 mins after the infusion started. Continue current dose to get a true trough of q12 dosing.   2.  Pharmacy will check trough levels as appropriate in 1-3 Days.    3. Serum creatinine levels will be ordered daily for the first week of therapy and at least twice weekly for subsequent weeks.      Niurka  Brown        .

## 2019-03-12 NOTE — PLAN OF CARE
Patient IV access continues to be an issue. Spoke with Nursing Supervisor around 0030 regarding Anesthesia, ED called a short while afterwards to offer staff support as able and possible ultrasound use. No new information regarding status of anesthesia.    ED staff reported pt is particular about what sites can be used on arms, ED will send another staff member to try after their break. RN Flyer has already attempted many times. Evening RN reported mother is calling and is unhappy with situation - mother has not called unit thus far.

## 2019-03-12 NOTE — PLAN OF CARE
Pt A&Ox4 VSS. LS clear. Independent in room with crutches. Loss of IV access awaiting placement. RN flyer was unsuccessful and anesthesia hs been notified x3. Once new IV placed Vancomycin can be given. Per pt request and per nursing PIV needs to be TKO when not in use as pt is a very hard stick. Pts mother called x2 with concerns of lines not being properly taken care of with loss of access. Redness on left foot has increased and is parked. Betadine soak and new dressing applied. Pt refused acewrap but is elevating leg on pillows    1500  -diagnostic tests and consults completed and resulted no awaiting podietry  -vital signs normal or at patient baseline yes  -adequate pain control on oral analgesics yes, PRN oxycodone  -tolerating oral antibiotics or has plans for home infusion setup -no still on IV antibiotics zofran with administration  1700  -diagnostic tests and consults completed and resulted no  -vital signs normal or at patient baseline -yes  -adequate pain control on oral analgesics yes  -tolerating oral antibiotics or has plans for home infusion setup -no  2000  -diagnostic tests and consults completed and resulted no   -vital signs normal or at patient baseline yes  -adequate pain control on oral analgesics yes  -tolerating oral antibiotics or has plans for home infusion setup-no   2200  -diagnostic tests and consults completed and resulted no  -vital signs normal or at patient baseline yes  -adequate pain control on oral analgesics yes  -tolerating oral antibiotics or has plans for home infusion setup no

## 2019-03-12 NOTE — PROGRESS NOTES
Pt was seen, case reviewed with nursing staff, Ortho, parents    Pt notes no improvement in L foot pain and swelling  She denies fevers or chills  She continues to c/o constipation, has not had a BM for 6 days  Has mild nausea, denies abd pain      Afebrile  BP 100s-130s/  HR 80s    Alert, fully oriented, in NAD, anxious  Lungs clear  CV rrr  Abd soft  L ankle edematous, erythematous, painful to palpation, perhaps sl more swollen than yesterday  No L calf tenderness or edema    Assessment    Left foot cellulitis, status post left ankle arthroscopy and ATFL repair January 2.  No significant improvement, now on Vancomycin for 48 hours (though interrupted, secondary to difficulty obtaining peripheral IV access)  Concern for underlying abscess.  Ortho has ordered US of foot.  Culture + for Staph aureus, S pending     GERD, stable, on Carafate    Constipation, secondary to meds, immobility, pain medications     Nausea, possibly medication related i.e. colchicine, oxycodone, antibiotics, constipation     Behcet's disease, stable    Plan  Ortho to review US  ID is following  Bowel regimen (reviewed meds at length with Pt and family)  Will need PICC line, given likelihood of at least 2 weeks of IV antibiotics and poor access

## 2019-03-13 ENCOUNTER — APPOINTMENT (OUTPATIENT)
Dept: INTERVENTIONAL RADIOLOGY/VASCULAR | Facility: CLINIC | Age: 24
DRG: 857 | End: 2019-03-13
Attending: PHYSICIAN ASSISTANT
Payer: COMMERCIAL

## 2019-03-13 ENCOUNTER — ANESTHESIA (OUTPATIENT)
Dept: INTERVENTIONAL RADIOLOGY/VASCULAR | Facility: CLINIC | Age: 24
End: 2019-03-13

## 2019-03-13 LAB
CRP SERPL-MCNC: 18.1 MG/L (ref 0–8)
ERYTHROCYTE [DISTWIDTH] IN BLOOD BY AUTOMATED COUNT: 13 % (ref 10–15)
ERYTHROCYTE [DISTWIDTH] IN BLOOD BY AUTOMATED COUNT: NORMAL % (ref 10–15)
HCT VFR BLD AUTO: 35.2 % (ref 35–47)
HCT VFR BLD AUTO: NORMAL % (ref 35–47)
HGB BLD-MCNC: 11.6 G/DL (ref 11.7–15.7)
HGB BLD-MCNC: NORMAL G/DL (ref 11.7–15.7)
MCH RBC QN AUTO: 29.1 PG (ref 26.5–33)
MCH RBC QN AUTO: NORMAL PG (ref 26.5–33)
MCHC RBC AUTO-ENTMCNC: 33 G/DL (ref 31.5–36.5)
MCHC RBC AUTO-ENTMCNC: NORMAL G/DL (ref 31.5–36.5)
MCV RBC AUTO: 88 FL (ref 78–100)
MCV RBC AUTO: NORMAL FL (ref 78–100)
PLATELET # BLD AUTO: 245 10E9/L (ref 150–450)
PLATELET # BLD AUTO: NORMAL 10E9/L (ref 150–450)
RBC # BLD AUTO: 3.98 10E12/L (ref 3.8–5.2)
RBC # BLD AUTO: NORMAL 10E12/L (ref 3.8–5.2)
WBC # BLD AUTO: 6.4 10E9/L (ref 4–11)
WBC # BLD AUTO: NORMAL 10E9/L (ref 4–11)

## 2019-03-13 PROCEDURE — 36592 COLLECT BLOOD FROM PICC: CPT | Performed by: INTERNAL MEDICINE

## 2019-03-13 PROCEDURE — 27210905 ZZH KIT CR7

## 2019-03-13 PROCEDURE — 25000131 ZZH RX MED GY IP 250 OP 636 PS 637: Performed by: INTERNAL MEDICINE

## 2019-03-13 PROCEDURE — 25000132 ZZH RX MED GY IP 250 OP 250 PS 637: Performed by: INTERNAL MEDICINE

## 2019-03-13 PROCEDURE — 02H633Z INSERTION OF INFUSION DEVICE INTO RIGHT ATRIUM, PERCUTANEOUS APPROACH: ICD-10-PCS | Performed by: PHYSICIAN ASSISTANT

## 2019-03-13 PROCEDURE — 99207 ZZC NO CHARGE VISIT/PATIENT NOT SEEN: CPT | Performed by: INTERNAL MEDICINE

## 2019-03-13 PROCEDURE — 85027 COMPLETE CBC AUTOMATED: CPT | Performed by: INTERNAL MEDICINE

## 2019-03-13 PROCEDURE — 36592 COLLECT BLOOD FROM PICC: CPT | Performed by: ORTHOPAEDIC SURGERY

## 2019-03-13 PROCEDURE — 25800030 ZZH RX IP 258 OP 636: Performed by: INTERNAL MEDICINE

## 2019-03-13 PROCEDURE — 86140 C-REACTIVE PROTEIN: CPT | Performed by: ORTHOPAEDIC SURGERY

## 2019-03-13 PROCEDURE — 25000128 H RX IP 250 OP 636: Performed by: INTERNAL MEDICINE

## 2019-03-13 PROCEDURE — 25000125 ZZHC RX 250: Performed by: PHYSICIAN ASSISTANT

## 2019-03-13 PROCEDURE — 25000132 ZZH RX MED GY IP 250 OP 250 PS 637: Performed by: ORTHOPAEDIC SURGERY

## 2019-03-13 PROCEDURE — C1751 CATH, INF, PER/CENT/MIDLINE: HCPCS

## 2019-03-13 PROCEDURE — 36573 INSJ PICC RS&I 5 YR+: CPT

## 2019-03-13 PROCEDURE — 27210886 ZZH ACCESSORY CR5

## 2019-03-13 PROCEDURE — 27210807 ZZH SHEATH CR6

## 2019-03-13 PROCEDURE — 12000001 ZZH R&B MED SURG/OB UMMC

## 2019-03-13 PROCEDURE — 99232 SBSQ HOSP IP/OBS MODERATE 35: CPT | Performed by: INTERNAL MEDICINE

## 2019-03-13 RX ORDER — HEPARIN SODIUM,PORCINE 10 UNIT/ML
5 VIAL (ML) INTRAVENOUS ONCE
Status: COMPLETED | OUTPATIENT
Start: 2019-03-13 | End: 2019-03-14

## 2019-03-13 RX ADMIN — SUCRALFATE 1 G: 1 SUSPENSION ORAL at 17:20

## 2019-03-13 RX ADMIN — LIDOCAINE HYDROCHLORIDE 3 ML: 10 INJECTION, SOLUTION EPIDURAL; INFILTRATION; INTRACAUDAL; PERINEURAL at 11:36

## 2019-03-13 RX ADMIN — GUANFACINE 3 MG: 2 TABLET ORAL at 08:29

## 2019-03-13 RX ADMIN — ACETAMINOPHEN 1000 MG: 500 TABLET ORAL at 08:58

## 2019-03-13 RX ADMIN — OXYCODONE HYDROCHLORIDE 5 MG: 5 TABLET ORAL at 08:58

## 2019-03-13 RX ADMIN — OXYCODONE HYDROCHLORIDE 10 MG: 5 TABLET ORAL at 23:23

## 2019-03-13 RX ADMIN — OXYCODONE HYDROCHLORIDE 10 MG: 5 TABLET ORAL at 05:34

## 2019-03-13 RX ADMIN — VANCOMYCIN HYDROCHLORIDE 1250 MG: 10 INJECTION, POWDER, LYOPHILIZED, FOR SOLUTION INTRAVENOUS at 15:36

## 2019-03-13 RX ADMIN — OXYCODONE HYDROCHLORIDE 10 MG: 5 TABLET ORAL at 14:11

## 2019-03-13 RX ADMIN — COLCHICINE 0.6 MG: 0.6 TABLET, FILM COATED ORAL at 08:29

## 2019-03-13 RX ADMIN — OXYCODONE HYDROCHLORIDE 5 MG: 5 TABLET ORAL at 10:27

## 2019-03-13 RX ADMIN — ONDANSETRON 4 MG: 2 INJECTION INTRAMUSCULAR; INTRAVENOUS at 21:33

## 2019-03-13 RX ADMIN — OXYCODONE HYDROCHLORIDE 10 MG: 5 TABLET ORAL at 17:21

## 2019-03-13 RX ADMIN — ACETAMINOPHEN 1000 MG: 500 TABLET ORAL at 17:21

## 2019-03-13 RX ADMIN — HYDROXYZINE HYDROCHLORIDE 25 MG: 25 TABLET ORAL at 19:26

## 2019-03-13 RX ADMIN — ONDANSETRON 4 MG: 4 TABLET, ORALLY DISINTEGRATING ORAL at 05:39

## 2019-03-13 RX ADMIN — SENNOSIDES 3 TABLET: 8.6 TABLET, FILM COATED ORAL at 08:28

## 2019-03-13 RX ADMIN — COLCHICINE 0.6 MG: 0.6 TABLET, FILM COATED ORAL at 21:32

## 2019-03-13 RX ADMIN — OXYCODONE HYDROCHLORIDE 10 MG: 5 TABLET ORAL at 20:38

## 2019-03-13 RX ADMIN — SUCRALFATE 1 G: 1 SUSPENSION ORAL at 21:31

## 2019-03-13 RX ADMIN — SUCRALFATE 1 G: 1 SUSPENSION ORAL at 00:42

## 2019-03-13 RX ADMIN — SUCRALFATE 1 G: 1 SUSPENSION ORAL at 12:36

## 2019-03-13 RX ADMIN — GUANFACINE 3 MG: 2 TABLET ORAL at 21:31

## 2019-03-13 RX ADMIN — AMITRIPTYLINE HYDROCHLORIDE 50 MG: 50 TABLET, FILM COATED ORAL at 21:32

## 2019-03-13 RX ADMIN — ONDANSETRON 4 MG: 2 INJECTION INTRAMUSCULAR; INTRAVENOUS at 15:30

## 2019-03-13 ASSESSMENT — ACTIVITIES OF DAILY LIVING (ADL)
ADLS_ACUITY_SCORE: 14

## 2019-03-13 NOTE — PROCEDURES
Interventional Radiology Brief Post Procedure Note    Procedure: IR PICC PLACEMENT > 5 YRS OF AGE    Proceduralist: Nader Rodriguez PA-C    Assistant: None    Time Out: Prior to the start of the procedure and with procedural staff participation, I verbally confirmed the patient s identity using two indicators, relevant allergies, that the procedure was appropriate and matched the consent or emergent situation, and that the correct equipment/implants were available. Immediately prior to starting the procedure I conducted the Time Out with the procedural staff and re-confirmed the patient s name, procedure, and site/side. (The Joint Commission universal protocol was followed.)  Yes    Sedation: None. Local Anesthestic used    Findings: Completed image-guided placement of 3 Fr 40 cm single lumen PICC via right medial brachial vein with catheter tip in the high right atrium. Aspirates and flushes freely, saline locked and ready for immediate use.    Estimated Blood Loss: Minimal    Fluoroscopy Time: 0.7 minute(s)    Specimens: None    Complications: 1. None     Condition: Stable    Plan: Ready for immediate use. Follow-up per primary team.    Comments: See dictated procedure note for full details.    Nader Rodriguez PA-C  Interventional Radiology  [phone]

## 2019-03-13 NOTE — ANESTHESIA POSTPROCEDURE EVALUATION
Anesthesia POST Procedure Evaluation    Patient: Samara Oropeza   MRN:     [id number] Gender:   female   Age:    23 year old :      1995        Preoperative Diagnosis: Left foot abscess   Procedure(s):  COMBINED IRRIGATION AND DEBRIDEMENT LEFT ANKLE   Postop Comments: No value filed.       Anesthesia Type:  General    Reportable Event: NO     PAIN: Uncomplicated   Sign Out status: Comfortable, Well controlled pain     PONV: No PONV   Sign Out status:  No Nausea or Vomiting     Neuro/Psych: Uneventful perioperative course   Sign Out Status: Preoperative baseline; Age appropriate mentation     Airway/Resp.: Uneventful perioperative course   Sign Out Status: Non labored breathing, age appropriate RR; Resp. Status within EXPECTED Parameters     CV: Uneventful perioperative course   Sign Out status: Appropriate BP and perfusion indices; Appropriate HR/Rhythm     Disposition:   Sign Out in:  PACU  Disposition:  Floor  Recovery Course: Uneventful  Follow-Up: Not required           Last Anesthesia Record Vitals:  CRNA VITALS  3/12/2019 1701 - 3/12/2019 1801      3/12/2019             NIBP:  129/91  (Abnormal)     Ht Rate:  124          Last PACU/Preop Vitals:  Vitals:    19 1830 19 1845 19 1920   BP: 133/89  129/84   Pulse: 109     Resp: 11 11    Temp:  36.6  C (97.9  F) 36.3  C (97.4  F)   SpO2: 100% 99% 98%         Electronically Signed By: Shelly Ulloa MD, 2019, 7:27 PM

## 2019-03-13 NOTE — PLAN OF CARE
Pt AxOx4. Up with A1-2 with crutches. Pt arrived back on the unit at 1900. Pt has been able to void in the bathroom without difficulty. Bladder scanned after first void was 0mL. Last BM 3/7/19, but passing gas and active bowel sounds. Pt has been experiencing nausea, IV zofran given down in PACU. No IV access right now, waiting for flyer to attempt again. Lung sounds clear in all fields. Capno on. Managing pain with Oxy 10mg and scheduled Tylenol. Encouraging fluids and full liquid diet. SARY drain in L foot with little output. L foot splinted and has ACE wrap, CDI. Will continue to monitor.

## 2019-03-13 NOTE — PROGRESS NOTES
RN flyer attempted to gain IV access and was unsuccessful after two attempts. Paged anesthesia, Anesthesia attempted twice on the other arm and was also unsuccessful. Currently no IV access for patient. Pt also stated for tomorrow when PICC line is being placed she needs to be numbed at least. Has had past traumatic experiences with line placement. Pt would also benefit on education on the PICC line placement beforehand to ease anxiety of the procedure.

## 2019-03-13 NOTE — CONSULTS
INTERVENTIONAL RADIOLOGY CONSULT    Samara Oropeza is a 23 year old female admitted with left ankle surgical site infection requiring intravenous antibiotics so IR has been consulted for single lumen PICC line placement.  The patient has a history of difficult peripheral IV placement, however has never had a PICC line per chart review.    Patient is on IR schedule this afternoon (3/13/2019) for a single-lumen PICC line placement.  We would prefer to avoid sedation and the patient is not n.p.o, should sedation be required we will have to reschedule. Consent will be done prior to procedure.    Discussed with Dr. Ma.    Nader Rodriguez PA-C  Interventional Radiology  [phone] (daytime IR pager)

## 2019-03-13 NOTE — PLAN OF CARE
Pt A&O x's 4. VSS. Afebrile. 02 sats in 90s on RA. Lungs clear. Denies SOB, chest pain but complained of nausea. Pt given Zofran. Tolerating regular diet. Bowel sound active in all quadrants. No BM  during the shift but passing gas. Offered suppository however pt declined . Voiding adequate amount into toilet. Pain fairly managed with prn oxycodone and scheduled tylenol. CMS intact, reports slight tingling in left toes. Pt is able to wiggle toes. Left leg is elevated on the pillow. Right leg pcd on.  No IV so unable to administer 0300 vancomycin. Pt slept between care and is able to make needs known, call light with in reach. Will continue to monitor.

## 2019-03-13 NOTE — PLAN OF CARE
VS: stable   O2: RA   Output: adequate   Last BM: 3/7/19   Activity: Up to the BR using crutches SBA of 1   Skin: Intact except incision   Pain: Tolerable with Roxicodone 5-10 mg ,ice pack helped with spasms   CMS: Tingling sensation left foot.   Dressing: Soft cast dressing CDI   Diet: regular   LDA: PICC line infusing   Equipment: IV pole,limb elevator, crutches   Plan: TBD   Additional Info: Pt offered lactulose ,miralax ,pt declined. Zofran 4 mg IV given prior IV Vanco. IS started. Family in room.Able to make needs known. Will cont. POC

## 2019-03-13 NOTE — PROGRESS NOTES
Care Coordinator Progress Note    Admission Date/Time:  3/9/2019  Attending MD:  Harpreet Corrales MD    Data  Chart reviewed, discussed with interdisciplinary team.   Patient was admitted for: Cellulitis and abscess of left ankle.    Concerns with insurance coverage for discharge needs: None.  Current Living Situation: Patient lives with boyfriend  Support System: Supportive  Services Involved: No services in place prior to admit  Transportation at Discharge: Family or friend will provide  Transportation to Medical Appointments:   - Not applicable  Barriers to Discharge: Medical Clearance    Coordination of Care and Referrals: Provided patient/family with options for Home Infusion.   Pt will have coverage for IV ABX in the home. PT will be covered at 100% for the drugs and supplies. PT will have a nursing visit copay of $30 per visit. Once pt has met their OOP the copay will be waived. Pt has an oop of $3000/787.65 met.       Per chart Review:  Samara Oropeza is a 23 year old female w/ PMHx of the Bechets disease, chronic pain, fibromyalgia, seizure disorder, tourettes and hx of ankle injury s/p left ankle arthoscopy and ATFL repair 1/2/19 admitted on 3/9/2019 for left LE cellulitis around surgical incision    PLAN:  Discharge with IV ABX     Assessment  This RNCC met with patient at bedside to introduce role of RNCC and discuss discharge planning with IV ABX. Patient sates she has never Had IV ABX at home or home care, patient has no preference with infusion company and is okay with using [facility]. Informed patient that we have a RN liaison here in the hospital that will providing teaching at the bedside, patient stated that she felt she would be able to manage this, boyfriend works during the day.Family membr wa at bedside offering reassurance. Patient sates she would have friend/family provide transport home when discharge is  Imminent.     Plan  Anticipated Discharge Date:  TBD  Anticipated Discharge  Plan:  Discharge with [facility]    Alma FONSECAN RN CCM  RN Care Coordinator [address] [place] [id number]  [email or web address]  [email or web address]  Office: [phone]  Pager: [phone]    To contact Weekend RNCC, dial [phone] and enter job code [phone] at prompt. This pager can not be contacted by text page or outside line.

## 2019-03-13 NOTE — PROVIDER NOTIFICATION
Notified Dr. Luciano that PIV went bad upon arrival and was infusing vancomycin. Aspirated 6mL. Does not appear red or swollen. Was very painful when flushing and with palpation. PIV removed and vanco stopped. Photo obtained and flowsheet in chart.

## 2019-03-13 NOTE — PROGRESS NOTES
Pt was seen, case reviewed with team   Events of surgery noted  Pt notes mod L foot pain  Continued difficulties with IV access. PICC to be placed this am  + nausea without emesis, no BM for one week. Pt has home miralax and lactulose here, + senna S--has been reluctant to take these medications    VSS  Afebrile  Anxious, in NAD, non-toxic appearing  Lungs clear  CV rrr  Abd soft, non-tender, non-distended  No R LE edema  L LE in splint    Intra op culture pending      Assessment    Abscess L foot, s/p drainage in OR yesterday. On Vanco. Fair pain control. Pt is NWB L LE.    Nausea, acute on chronic, likely secondary to medications, constipation  Importance of aggressively treating constipation was discussed    GERD, on carafate    Plan  Continue current pain med regimen  Bowel regimen, reviewed with Pt  MWB  PT eval and treatment  PICC line today  IV Vanco for at least 2 weeks  Possible discharge in 1-3 days based on pain control, review of final cultures, mobilization

## 2019-03-13 NOTE — PROGRESS NOTES
Received report from PACU RN and patient arrived to the unit at 1900. Pt stated R PIV had been burning with infusion of Vanco, slowed rate to 100, but pt stated it had been burning with flush down in OR. Vanco withdrawn from vein and PIV removed. Pt resting in bed with Mother and boyfriend at bedside. Call light within reach will continue to monitor.

## 2019-03-13 NOTE — PROGRESS NOTES
"Orthopaedic Surgery Progress Note   March 11, 2019    Subjective: Pain stable compared to yesterday. No fevers/chills. Drain with some serosanguinous output.     Objective: /83 (BP Location: Left arm)   Pulse 110   Temp 97  F (36.1  C) (Oral)   Resp 16   Ht 1.6 m (5' 3\")   Wt 74.3 kg (163 lb 14.4 oz)   LMP 03/09/2019   SpO2 99%   BMI 29.03 kg/m      General: NAD, alert and oriented, cooperative with exam.   Cardio: RRR, extremities wwp.   Respiratory: Non-labored breathing.  MSK: LLE: Toes wwp, DP 2+, bcr in all toes.  +EHL/FHL. SILT SP/DP/Sa/Cat/T. Dressing with serous drainage, changed. No purulence.      3/11 Labs:   WBC 4.3  Hgb 10.8  Plts 200  CRP 84 -> 51.8 -> 29.4 (3/11)  ED Wound Cx: Staph aureus.     Assessment and Plan: Samara Oropeza is a 23 year old female with PMH including anxiety, Behcet's disease, fibromyalgia, GERD, migraines, seizure disorder, Tourette's and s/p L ankle arthroscopy and ATFL repair on 1/2 by Dr. Johnson presenting with increasing erythema, drainage and pain surrounding her surgical incision. Underwent I&D 3/12 with abscess found.      Internal Medicine Primary.  Activity: Up with assist until independent.  Weight bearing status: NWB LLE.  Should be in splint / CAM for support given lack of lateral ankle ligamentous support.  Pain management: Per primary.    Antibiotics/Tetanus: Per primary. Currently Vancomycin.   Anticoagulation/DVT prophylaxis: Per primary.    Labs: Monitor inflammatory markers.  Bracing/Splinting: Should be in splint / CAM for support given lack of lateral ankle ligamentous    Dressings: Keep clean, dry and intact.  Discontinue soaks.  Elevation: Elevate LLE on pillows to keep above the level of the heart as much as possible.   Cultures: Staph aureus, susceptibility pending  Follow-up: Dr Glover 2 weeks after discharge for wound check          Nelson Montenegro MD  Orthopaedic Surgery PGY-5  Pager:  [phone]      "

## 2019-03-13 NOTE — PROGRESS NOTES
"INFECTIOUS DISEASE FOLLOW-UP: PATIENT NOT EXAMINED -- NO CHARGE    NAME: Samara Oropeza  MRN:  [id number]  AGE:  23 year old            :  1995    PRIMARY CARE PROVIDER:  Sonja Abreu  Date of Admission:   3/9/2019  Consult Requester:  Harpreet Corrales MD      INTERVAL EVENTS:     1. Following the ultrasound's demonstration of the fluid collection suggestive of an abscess in the left foot, the patient underwent surgery that included irrigation and debridement of the abscess and removal of hardware. Surgical findings included:    The patient's prior surgical site was incised, and gross purulence was immediately encountered.  This was sent for culture.  We noted that the site of the abscess extended down to the level of the tibiotalar joint and subtalar joint, both of which were completely lacking capsule along the anteromedial aspect and lateral aspect of the tibiotalar and subtalar joints, respectively.  There were retained sutures which had been placed at the time of the ligamentous reconstruction, however due to the infection causing tissue damage, they were no longer holding the tissues together.  As they were braided sutures, we removed them to eliminate risk of subsequent infection.     We debrided all nonviable appearing tissue, and then thoroughly irrigated the sites including the tibiotalar and subtalar joints with 1 L of fluid using a bulb syringe.    2. Culture from the OR is growing Staphylococcus aureus.     3. I spoke with the patient and her family members for 25 minutes about the microbiology, about Staphylococcus aureus, about her surgery, and the removal by orthopedic surgery of what had been done in her earlier surgery and the patient burst into tears, as she did not know what was found or done in the operating room. Family members told me that there were \"trying to keep it from her\" because she has been through \"so much with her Behçet's.\" I asked her if she wanted me to " "stay in the room and she said that she did, and I stayed there for approximately another 5 minutes as she continued to cry while being held by a family member. I asked her if she had any questions I could answer and she didn't, so I spoke with her and the family and told them that I would return to see her tomorrow.    IMPRESSION:      1. Post-op from left foot irrigation and debridement of the abscess and removal of hardware  2. Given the extent of the operative findings, an important issue is the probability of acute osteomyelitis. This would require 6 weeks of intravenous antibiotics.    SUGGESTIONS:      1. Continue IV vancomycin: discuss the issue of osteomyelitis with the orthopedic surgery team as the duration of IV antibiotics would be a minimum of 6 weeks with \"day zero\" the date of the surgery yesterday (3/12/2019)      ALLERGIES:  Amoxil [penicillins]; Bee venom; Contrast dye; Kiwi; Orange fruit [citrus]; Pineapple; Reglan [metoclopramide]; Ace inhibitors; Amitiza [lubiprostone]; Amoxicillin-pot clavulanate; Latex; Midazolam; No clinical screening - see comments; Versed; Adhesive tape; Azithromycin; Cephalexin; and Keflex [cephalexin-fd&c yellow #6]    CURRENT MEDICATIONS:  Current Facility-Administered Medications   Medication     acetaminophen (TYLENOL) tablet 1,000 mg     albuterol (PROAIR HFA/PROVENTIL HFA/VENTOLIN HFA) 108 (90 Base) MCG/ACT inhaler 2 puff     amitriptyline (ELAVIL) tablet 50 mg     artificial tears ophthalmic ointment     benzocaine (ORAJEL MAXIMUM STRENGTH) 20 % gel     colchicine (COLCYRS) tablet 0.6 mg     cyproheptadine (PERIACTIN) tablet 4 mg     dicyclomine (BENTYL) tablet 20 mg     diphenhydrAMINE (BENADRYL) capsule 25-50 mg     guanFACINE (TENEX) tablet 3 mg     heparin lock flush 10 UNIT/ML injection 5 mL     hydrOXYzine (ATARAX) tablet 25-50 mg     hypromellose (ARTIFICIAL TEARS) 0.5 % ophthalmic solution 1 drop     lactase (LACTAID) tablet 3,000 Units     lactulose " (CHRONULAC) solution 20 g     lactulose (CHRONULAC) solution 30 mL     lidocaine (LMX4) cream     lidocaine 1 % 0.1-1 mL     linaclotide (LINZESS) capsule 145 mcg     magic mouthwash suspension (diphenhydrAMINE, lidocaine, aluminum-magnesium & simethicone)     melatonin tablet 1 mg     naloxone (NARCAN) injection 0.1-0.4 mg     naloxone (NARCAN) injection 0.1-0.4 mg     nitroFURantoin macrocrystal-monohydrate (MACROBID) capsule 100 mg     norgestimate-ethinyl estradiol (ORTHO-CYCLEN/SPRINTEC) 0.25-35 MG-MCG per tablet 1 tablet     ondansetron (ZOFRAN-ODT) ODT tab 4 mg    Or     ondansetron (ZOFRAN) injection 4 mg     oxyCODONE (ROXICODONE) tablet 5-10 mg     polyethylene glycol (MIRALAX/GLYCOLAX) Packet 17 g     sennosides (SENOKOT) tablet 3 tablet     sennosides (SENOKOT) tablet 8.6 mg     sodium chloride (PF) 0.9% PF flush 10 mL     sodium chloride (PF) 0.9% PF flush 10-20 mL     sodium chloride 0.9% infusion     sucralfate (CARAFATE) suspension 1 g     vancomycin (VANCOCIN) 1,250 mg in sodium chloride 0.9 % 250 mL intermittent infusion       REVIEW OF SYSTEMS:  Not performed as the patient was crying after discussing the surgery with her.      LABORATORY RESULTS:  Inflammatory Markers    Recent Labs   Lab Test 03/13/19  1358 03/11/19  0713 03/10/19  0643 03/09/19  1120 04/18/18  1724 12/21/17  1821 04/21/17  1249 04/14/17  1351 11/06/16  1341 03/11/16  1350 11/18/15  1453 11/14/15  1300 05/06/15  0220 12/31/14  1932 12/08/14  2140   SED  --   --   --   --   --  13  --   --  4 5 6 6 6 6 10   CRP 18.1* 29.4* 51.8* 84.0* <2.9 12.0* <2.9 <2.9 <2.9 <2.9 <2.9 <2.9 <2.9 <2.9 8.3*       Hematology Studies    Recent Labs   Lab Test 03/13/19  1358 03/11/19  0713 03/10/19  1734 03/10/19  0643 03/09/19  1120 12/12/18  1527 10/30/18  1606  04/18/18  1724 01/17/18  1610 01/08/18  1550 12/21/17  1821   WBC Canceled, Test credited 4.3  --  5.3 6.6 4.5 4.5   < > 5.0 5.8 5.1 3.9*   ANEU  --   --   --   --  4.6  --  2.3  --  2.2  2.7 2.5 1.4*   AEOS  --   --   --   --  0.1  --  0.1  --  0.1 0.1 0.1 0.1   HGB Canceled, Test credited 10.8* 11.2* 11.4* 12.4 12.2 12.7   < > 11.7 11.9 10.9* 12.5   MCV Canceled, Test credited 90  --  91 92 88 89   < > 87 88 88 84   PLT Canceled, Test credited 200  --  185 211 236 213   < > 215 243 243 272    < > = values in this interval not displayed.       Immune Globulin Studies    Recent Labs   Lab Test 01/17/18  1610   IGA 97       Metabolic Studies     Recent Labs   Lab Test 03/11/19  0713 03/09/19  1120 12/12/18  1527 10/30/18  1606 04/18/18  1724    136 137 138 142   POTASSIUM 4.0 3.9 3.9 4.0 3.9   CHLORIDE 109 104 105 106 108   CO2 24 23 24 24 24   BUN 7 11 8 13 11   CR 0.70 0.77 0.67 0.79 0.77   GFRESTIMATED >90 >90 >90 89 >90       Hepatic Studies    Recent Labs   Lab Test 03/09/19  1120 12/12/18  1527 10/30/18  1606 04/18/18  1724 01/08/18  1440 11/03/17  1114   BILITOTAL 0.4 0.3 0.3 0.5 0.4 0.3   ALKPHOS 74 55 69 81 91 99   ALBUMIN 3.3* 3.7 3.7 4.0 3.8 4.0   AST 20 34 34 26 26 34   ALT 26 33 35 28 34 40       Thyroid Studies    Recent Labs   Lab Test 10/30/18  1606 11/26/16  1440   TSH 1.06 0.87       Microbiology:  Culture Micro   Date Value Ref Range Status   03/12/2019 Culture negative monitoring continues  Preliminary   03/12/2019 Moderate growth  Staphylococcus aureus   (A)  Preliminary   03/12/2019 Culture in progress  Preliminary   03/12/2019 Culture negative monitoring continues  Preliminary   03/12/2019 Moderate growth  Staphylococcus aureus   (A)  Preliminary   03/12/2019 Culture in progress  Preliminary   03/09/2019 (A)  Preliminary    Moderate growth  Staphylococcus aureus  Additional susceptibilities in progress  3/12/19     03/09/2019 (A)  Preliminary    Moderate growth  Coagulase negative Staphylococcus  Susceptibility testing not routinely done     12/05/2018 (A)  Final    >100,000 colonies/mL  Staphylococcus saprophyticus     10/31/2018 No growth  Final   09/06/2018 (A)  Final     50,000 to 100,000 colonies/mL  Staphylococcus aureus     2018 (A)  Final    50,000 to 100,000 colonies/mL  Coagulase negative Staphylococcus     2016 No Beta Streptococcus isolated  Final   2013 No Beta Streptococcus isolated  Final   2012   Final    Moderate growth Beta hemolytic Streptococcus group A  Critical Value called to and read back by Dr. Kuo (URPER) 1059 12,hg   2011 No Beta Streptococcus isolated  Final       Urine Studies    Recent Labs   Lab Test 18  1348 10/31/18  1456 18  1345 18  1641 18  1553 17  1239   LEUKEST Small*  --  Small* Negative Negative Negative   WBCU 25-50* 10-25* 25-50* 1 0  --        Vancomycin Levels    Recent Labs   Lab Test 19  0316   VANCOMYCIN 9.0     Hepatitis B Testing   Recent Labs   Lab Test 16  1218   HBCAB Nonreactive   HEPBANG Nonreactive     Hepatitis C Testing     Hepatitis C Antibody   Date Value Ref Range Status   2016  NR Final    Nonreactive   Assay performance characteristics have not been established for newborns,   infants, and children           Vitals:    19 2308 19 0524 19 0841 19 1550   BP: 136/86 147/83 102/59 122/76   BP Location:  Left arm Left arm Left arm   Pulse:   81    Resp: 16 16 16 16   Temp: 98.7  F (37.1  C) 97  F (36.1  C) 97.4  F (36.3  C) 97.3  F (36.3  C)   TempSrc: Oral Oral  Oral   SpO2: 94% 99% 99% 95%   Weight:       Height:         Temp (high/low/average during past 24 hours): Temp (24hrs), Av.7  F (36.5  C), Min:97  F (36.1  C), Max:98.7  F (37.1  C)      PHYSICAL EXAMINATION:  Not performed as the patient was crying after discussing the surgery with her.      Electronically signed by Duane Bejarano M.D.  3/13/2019 5:00 PM  --

## 2019-03-14 ENCOUNTER — HOME INFUSION (PRE-WILLOW HOME INFUSION) (OUTPATIENT)
Dept: PHARMACY | Facility: CLINIC | Age: 24
End: 2019-03-14

## 2019-03-14 ENCOUNTER — APPOINTMENT (OUTPATIENT)
Dept: PHYSICAL THERAPY | Facility: CLINIC | Age: 24
DRG: 857 | End: 2019-03-14
Attending: INTERNAL MEDICINE
Payer: COMMERCIAL

## 2019-03-14 LAB
ALBUMIN SERPL-MCNC: 2.7 G/DL (ref 3.4–5)
ALP SERPL-CCNC: 64 U/L (ref 40–150)
ALT SERPL W P-5'-P-CCNC: 28 U/L (ref 0–50)
ANION GAP SERPL CALCULATED.3IONS-SCNC: 13 MMOL/L (ref 3–14)
AST SERPL W P-5'-P-CCNC: 44 U/L (ref 0–45)
BACTERIA SPEC CULT: ABNORMAL
BACTERIA SPEC CULT: ABNORMAL
BILIRUB SERPL-MCNC: 0.6 MG/DL (ref 0.2–1.3)
BUN SERPL-MCNC: 12 MG/DL (ref 7–30)
CALCIUM SERPL-MCNC: 8 MG/DL (ref 8.5–10.1)
CHLORIDE SERPL-SCNC: 107 MMOL/L (ref 94–109)
CO2 SERPL-SCNC: 19 MMOL/L (ref 20–32)
CREAT SERPL-MCNC: 0.71 MG/DL (ref 0.52–1.04)
GFR SERPL CREATININE-BSD FRML MDRD: >90 ML/MIN/{1.73_M2}
GLUCOSE SERPL-MCNC: 76 MG/DL (ref 70–99)
Lab: ABNORMAL
POTASSIUM SERPL-SCNC: 5 MMOL/L (ref 3.4–5.3)
PROT SERPL-MCNC: 5.6 G/DL (ref 6.8–8.8)
SODIUM SERPL-SCNC: 139 MMOL/L (ref 133–144)
SPECIMEN SOURCE: ABNORMAL
VANCOMYCIN SERPL-MCNC: 5.1 MG/L

## 2019-03-14 PROCEDURE — 97161 PT EVAL LOW COMPLEX 20 MIN: CPT | Mod: GP

## 2019-03-14 PROCEDURE — 80202 ASSAY OF VANCOMYCIN: CPT | Performed by: ORTHOPAEDIC SURGERY

## 2019-03-14 PROCEDURE — 25000128 H RX IP 250 OP 636: Performed by: INTERNAL MEDICINE

## 2019-03-14 PROCEDURE — 12000001 ZZH R&B MED SURG/OB UMMC

## 2019-03-14 PROCEDURE — 25000132 ZZH RX MED GY IP 250 OP 250 PS 637: Performed by: INTERNAL MEDICINE

## 2019-03-14 PROCEDURE — 99231 SBSQ HOSP IP/OBS SF/LOW 25: CPT | Performed by: INTERNAL MEDICINE

## 2019-03-14 PROCEDURE — 25000132 ZZH RX MED GY IP 250 OP 250 PS 637: Performed by: ORTHOPAEDIC SURGERY

## 2019-03-14 PROCEDURE — 80053 COMPREHEN METABOLIC PANEL: CPT | Performed by: INTERNAL MEDICINE

## 2019-03-14 PROCEDURE — 97530 THERAPEUTIC ACTIVITIES: CPT | Mod: GP

## 2019-03-14 PROCEDURE — 36592 COLLECT BLOOD FROM PICC: CPT | Performed by: ORTHOPAEDIC SURGERY

## 2019-03-14 PROCEDURE — 99207 ZZC CDG-MDM COMPONENT: MEETS LOW - DOWN CODED: CPT | Performed by: INTERNAL MEDICINE

## 2019-03-14 PROCEDURE — 25800030 ZZH RX IP 258 OP 636: Performed by: INTERNAL MEDICINE

## 2019-03-14 PROCEDURE — 99233 SBSQ HOSP IP/OBS HIGH 50: CPT | Performed by: INTERNAL MEDICINE

## 2019-03-14 PROCEDURE — 97116 GAIT TRAINING THERAPY: CPT | Mod: GP

## 2019-03-14 PROCEDURE — 80053 COMPREHEN METABOLIC PANEL: CPT | Performed by: ORTHOPAEDIC SURGERY

## 2019-03-14 PROCEDURE — 99207 ZZC CDG-MDM COMPONENT: MEETS MODERATE - UP CODED: CPT | Performed by: INTERNAL MEDICINE

## 2019-03-14 PROCEDURE — 25000128 H RX IP 250 OP 636: Performed by: PHYSICIAN ASSISTANT

## 2019-03-14 PROCEDURE — 25000128 H RX IP 250 OP 636

## 2019-03-14 RX ORDER — DIPHENHYDRAMINE HCL 25 MG
50 CAPSULE ORAL 4 TIMES DAILY PRN
Status: DISCONTINUED | OUTPATIENT
Start: 2019-03-14 | End: 2019-03-16 | Stop reason: HOSPADM

## 2019-03-14 RX ORDER — HYDROXYZINE HYDROCHLORIDE 25 MG/1
25 TABLET, FILM COATED ORAL EVERY 6 HOURS PRN
Status: DISCONTINUED | OUTPATIENT
Start: 2019-03-14 | End: 2019-03-16 | Stop reason: HOSPADM

## 2019-03-14 RX ORDER — BISACODYL 10 MG
10 SUPPOSITORY, RECTAL RECTAL DAILY PRN
Status: DISCONTINUED | OUTPATIENT
Start: 2019-03-14 | End: 2019-03-16 | Stop reason: HOSPADM

## 2019-03-14 RX ORDER — HYDROXYZINE HYDROCHLORIDE 25 MG/1
50 TABLET, FILM COATED ORAL EVERY 6 HOURS PRN
Status: DISCONTINUED | OUTPATIENT
Start: 2019-03-14 | End: 2019-03-16 | Stop reason: HOSPADM

## 2019-03-14 RX ORDER — DIPHENHYDRAMINE HYDROCHLORIDE 50 MG/ML
25-50 INJECTION INTRAMUSCULAR; INTRAVENOUS EVERY 6 HOURS PRN
Status: DISCONTINUED | OUTPATIENT
Start: 2019-03-14 | End: 2019-03-16 | Stop reason: HOSPADM

## 2019-03-14 RX ORDER — DIPHENHYDRAMINE HCL 25 MG
25 CAPSULE ORAL 4 TIMES DAILY PRN
Status: DISCONTINUED | OUTPATIENT
Start: 2019-03-14 | End: 2019-03-14

## 2019-03-14 RX ORDER — DIPHENHYDRAMINE HYDROCHLORIDE AND LIDOCAINE HYDROCHLORIDE AND ALUMINUM HYDROXIDE AND MAGNESIUM HYDRO
10 KIT EVERY 6 HOURS PRN
Status: DISCONTINUED | OUTPATIENT
Start: 2019-03-14 | End: 2019-03-16 | Stop reason: HOSPADM

## 2019-03-14 RX ORDER — BISACODYL 5 MG
10 TABLET, DELAYED RELEASE (ENTERIC COATED) ORAL DAILY PRN
Status: DISCONTINUED | OUTPATIENT
Start: 2019-03-14 | End: 2019-03-16 | Stop reason: HOSPADM

## 2019-03-14 RX ORDER — VANCOMYCIN HYDROCHLORIDE 1 G/200ML
1000 INJECTION, SOLUTION INTRAVENOUS EVERY 8 HOURS
Status: DISCONTINUED | OUTPATIENT
Start: 2019-03-14 | End: 2019-03-15

## 2019-03-14 RX ADMIN — VANCOMYCIN HYDROCHLORIDE 1250 MG: 10 INJECTION, POWDER, LYOPHILIZED, FOR SOLUTION INTRAVENOUS at 03:40

## 2019-03-14 RX ADMIN — SUCRALFATE 1 G: 1 SUSPENSION ORAL at 17:25

## 2019-03-14 RX ADMIN — OXYCODONE HYDROCHLORIDE 5 MG: 5 TABLET ORAL at 02:30

## 2019-03-14 RX ADMIN — OXYCODONE HYDROCHLORIDE 10 MG: 5 TABLET ORAL at 21:22

## 2019-03-14 RX ADMIN — OXYCODONE HYDROCHLORIDE 10 MG: 5 TABLET ORAL at 17:21

## 2019-03-14 RX ADMIN — BISACODYL 10 MG: 5 TABLET, COATED ORAL at 17:21

## 2019-03-14 RX ADMIN — GUANFACINE 3 MG: 2 TABLET ORAL at 21:23

## 2019-03-14 RX ADMIN — ACETAMINOPHEN 1000 MG: 500 TABLET ORAL at 09:54

## 2019-03-14 RX ADMIN — ONDANSETRON 4 MG: 2 INJECTION INTRAMUSCULAR; INTRAVENOUS at 14:25

## 2019-03-14 RX ADMIN — SUCRALFATE 1 G: 1 SUSPENSION ORAL at 09:52

## 2019-03-14 RX ADMIN — COLCHICINE 0.6 MG: 0.6 TABLET, FILM COATED ORAL at 09:55

## 2019-03-14 RX ADMIN — ACETAMINOPHEN 1000 MG: 500 TABLET ORAL at 17:21

## 2019-03-14 RX ADMIN — HYDROXYZINE HYDROCHLORIDE 50 MG: 25 TABLET ORAL at 02:30

## 2019-03-14 RX ADMIN — SUCRALFATE 1 G: 1 SUSPENSION ORAL at 12:43

## 2019-03-14 RX ADMIN — COLCHICINE 0.6 MG: 0.6 TABLET, FILM COATED ORAL at 21:22

## 2019-03-14 RX ADMIN — MINERAL OIL AND WHITE PETROLATUM: 150; 830 OINTMENT OPHTHALMIC at 21:23

## 2019-03-14 RX ADMIN — VANCOMYCIN HYDROCHLORIDE 1000 MG: 1 INJECTION, SOLUTION INTRAVENOUS at 12:30

## 2019-03-14 RX ADMIN — HEPARIN, PORCINE (PF) 10 UNIT/ML INTRAVENOUS SYRINGE 5 ML: at 02:06

## 2019-03-14 RX ADMIN — AMITRIPTYLINE HYDROCHLORIDE 25 MG: 25 TABLET, FILM COATED ORAL at 21:22

## 2019-03-14 RX ADMIN — ACETAMINOPHEN 1000 MG: 500 TABLET ORAL at 02:31

## 2019-03-14 RX ADMIN — NORGESTIMATE AND ETHINYL ESTRADIOL 1 TABLET: KIT at 09:55

## 2019-03-14 RX ADMIN — DIPHENHYDRAMINE HYDROCHLORIDE 25 MG: 25 CAPSULE ORAL at 20:00

## 2019-03-14 RX ADMIN — OXYCODONE HYDROCHLORIDE 10 MG: 5 TABLET ORAL at 14:21

## 2019-03-14 RX ADMIN — OXYCODONE HYDROCHLORIDE 10 MG: 5 TABLET ORAL at 10:00

## 2019-03-14 RX ADMIN — SUCRALFATE 1 G: 1 SUSPENSION ORAL at 21:22

## 2019-03-14 RX ADMIN — GUANFACINE 3 MG: 2 TABLET ORAL at 09:53

## 2019-03-14 RX ADMIN — ONDANSETRON 4 MG: 2 INJECTION INTRAMUSCULAR; INTRAVENOUS at 21:42

## 2019-03-14 RX ADMIN — VANCOMYCIN HYDROCHLORIDE 1000 MG: 1 INJECTION, SOLUTION INTRAVENOUS at 21:18

## 2019-03-14 RX ADMIN — SENNOSIDES 3 TABLET: 8.6 TABLET, FILM COATED ORAL at 21:22

## 2019-03-14 RX ADMIN — SENNOSIDES 3 TABLET: 8.6 TABLET, FILM COATED ORAL at 09:54

## 2019-03-14 RX ADMIN — HYDROXYZINE HYDROCHLORIDE 25 MG: 25 TABLET ORAL at 10:00

## 2019-03-14 RX ADMIN — LACTASE TAB 3000 UNIT 3000 UNITS: 3000 TAB at 09:55

## 2019-03-14 ASSESSMENT — ACTIVITIES OF DAILY LIVING (ADL)
ADLS_ACUITY_SCORE: 16
ADLS_ACUITY_SCORE: 16
ADLS_ACUITY_SCORE: 14
ADLS_ACUITY_SCORE: 16
ADLS_ACUITY_SCORE: 16
ADLS_ACUITY_SCORE: 14

## 2019-03-14 NOTE — PLAN OF CARE
Pt AxOx4. Pt up with SBA and crutches. Pt has been voiding spontaneously without difficulty. Last BM was 3/6/19, bowel sounds hypoactive, and passing gas. Pt stated her home medication was not really working, but refused Miralax, senna and a suppository. IV Zofran given for nausea. Lung sounds clear in all fields. Managing pain with scheduled Tylenol, Oxy, and Atarax. Dressing on L foot is CDI. SARY drainage is bright/bloody red. PICC line in R arm is SL. Will continue to monitor.

## 2019-03-14 NOTE — PLAN OF CARE
Patient desired to be left alone to sleep this morning, and so that was honored until shortly before 10.  She was awakened for medications.  She did go back to sleep for a while following this, but then did awaken, order meal, and was bright and conversant.  Friend at bedside.  She has used Oxycodone for pain control, and reports that it does help.         She is approx one week out from her last BM.  She has a long history of constipation, per patient and her Mother. She declined the Miralax today, and initially declined taking the oral laxative as well. She did request to take it later in the day.  She was offered a suppository, and she is not interested.       She was seen by Home  IV Therapy today, and he did come back and talk to her again with her Mother present.  (Mother is an RN for [employer] [place]).

## 2019-03-14 NOTE — PLAN OF CARE
Patient A/Ox4. VSS. Denies CP, SOB, dizziness/LH. Declined most assessment tonight, pt wanted to sleep and requested as such. Voiding well in bathroom. CMS intact. Dressing to foot CDI, ice applied for a while and pt took ice packs off. Per report, pt still has had no BM and continues to refuse stool softeners. Tolerating regular diet without NV. Activity level is good, pt requires very minimal help getting foot back in bed and while sitting. IV PICC in place, can be fussy with the hub but runs well and has few issues otherwise. Pain rated as well managed throughout shift, pt's friend stated that pt verbalized to her earlier that she might be taking too much pain medication and decided to only get 5mg oxycodone with 50mg vistaril and tylenol. Particular and directs own cares, but has been pleasant. Patient has demonstrated ability to call appropriately. Patient is resting with call light within reach. Will continue to monitor.

## 2019-03-14 NOTE — PROGRESS NOTES
"INFECTIOUS DISEASE FOLLOW-UP    NAME: Samara Oropeza  MRN:  [id number]  AGE:  23 year old            :  1995    PRIMARY CARE PROVIDER:  Sonja Abreu  Date of Admission:   3/9/2019  Consult Requester:  Duane Ma MD      REASON FOR SEEING: follow-up for left foot infection    INTERVAL EVENTS:     Patient is feeling in better spirits today.    PICC line is present (right upper extremity)    Discomfort with oral ulcer on the roof of her mouth to the right of the midline    Constipation: no bowel movement in 9 days according to the patient    Hypoalbuminemia (2.7 g/dL)    According to the patient, she has \"Had my period for weeks\"    IMPRESSION AND SUGGESTIONS:    1. Staphylococcus aureus osteomyelitis, now post-op. For osteomyelitis, 6 weeks of iv antibiotics.  2. The 2 mm oral ulcer that is likely due to Behçet disease. She has orders for several local treatments for the oral ulcer  3. Constipation: she is on a bowel regimen    Suggest:  1. Continue IV vancomycin. Serum creatinine has been stable.  2. Given her difficult IV access, she should have a PICC line placed.  3. The patient is on a regular adult diet. Suggest additional supplementation given the low albumin level  4. Consider further evaluation of the bleeding per vagina    The medical record includes allergies to both penicillins (possibly a rash, patient's father was uncertain) and cephalosporins (hives). As a result, she is likely to require IV vancomycin (or, if needed, daptomycin)      ALLERGIES:  Amoxil [penicillins]; Bee venom; Contrast dye; Kiwi; Orange fruit [citrus]; Pineapple; Reglan [metoclopramide]; Ace inhibitors; Amitiza [lubiprostone]; Amoxicillin-pot clavulanate; Latex; Midazolam; No clinical screening - see comments; Versed; Adhesive tape; Azithromycin; Cephalexin; and Keflex [cephalexin-fd&c yellow #6]    CURRENT MEDICATIONS:  Current Facility-Administered Medications   Medication     acetaminophen (TYLENOL) tablet " 1,000 mg     albuterol (PROAIR HFA/PROVENTIL HFA/VENTOLIN HFA) 108 (90 Base) MCG/ACT inhaler 2 puff     amitriptyline (ELAVIL) tablet 25 mg     artificial tears ophthalmic ointment     benzocaine (ORAJEL MAXIMUM STRENGTH) 20 % gel     bisacodyl (DULCOLAX) Suppository 10 mg     colchicine (COLCYRS) tablet 0.6 mg     cyproheptadine (PERIACTIN) tablet 4 mg     dicyclomine (BENTYL) tablet 20 mg     diphenhydrAMINE (BENADRYL) capsule 25-50 mg     guanFACINE (TENEX) tablet 3 mg     hydrOXYzine (ATARAX) tablet 25-50 mg     hypromellose (ARTIFICIAL TEARS) 0.5 % ophthalmic solution 1 drop     lactase (LACTAID) tablet 3,000 Units     lactulose (CHRONULAC) solution 20 g     lidocaine (LMX4) cream     lidocaine 1 % 0.1-1 mL     linaclotide (LINZESS) capsule 145 mcg     magic mouthwash suspension (diphenhydrAMINE, lidocaine, aluminum-magnesium & simethicone)     melatonin tablet 1 mg     naloxone (NARCAN) injection 0.1-0.4 mg     naloxone (NARCAN) injection 0.1-0.4 mg     nitroFURantoin macrocrystal-monohydrate (MACROBID) capsule 100 mg     norgestimate-ethinyl estradiol (ORTHO-CYCLEN/SPRINTEC) 0.25-35 MG-MCG per tablet 1 tablet     ondansetron (ZOFRAN-ODT) ODT tab 4 mg    Or     ondansetron (ZOFRAN) injection 4 mg     oxyCODONE (ROXICODONE) tablet 5-10 mg     polyethylene glycol (MIRALAX/GLYCOLAX) Packet 17 g     sennosides (SENOKOT) tablet 3 tablet     sodium chloride (PF) 0.9% PF flush 10 mL     sodium chloride (PF) 0.9% PF flush 10-20 mL     sodium chloride 0.9% infusion     sucralfate (CARAFATE) suspension 1 g     vancomycin (VANCOCIN) 1000 mg in dextrose 5% 200 mL PREMIX       REVIEW OF SYSTEMS:  Negative for fever or chills. Positive for intermittent cough that is productive of light-colored sputum. Positive for feeling that she is breathing better after using her inhaler. Positive for constipation and abdominal discomfort. Positive for left foot pain. Positive for discomfort due to an oral ulcer on the roof of her mouth  to the right of the midline Otherwise a 10-point ROS is negative.    LABORATORY RESULTS:  Inflammatory Markers    Recent Labs   Lab Test 03/13/19  1358 03/11/19  0713 03/10/19  0643 03/09/19  1120 04/18/18  1724 12/21/17  1821 04/21/17  1249 04/14/17  1351 11/06/16  1341 03/11/16  1350 11/18/15  1453 11/14/15  1300 05/06/15  0220 12/31/14  1932 12/08/14  2140   SED  --   --   --   --   --  13  --   --  4 5 6 6 6 6 10   CRP 18.1* 29.4* 51.8* 84.0* <2.9 12.0* <2.9 <2.9 <2.9 <2.9 <2.9 <2.9 <2.9 <2.9 8.3*       Hematology Studies    Recent Labs   Lab Test 03/13/19  1643 03/13/19  1358 03/11/19  0713 03/10/19  1734 03/10/19  0643 03/09/19  1120 12/12/18  1527 10/30/18  1606  04/18/18  1724 01/17/18  1610 01/08/18  1550 12/21/17  1821   WBC 6.4 Canceled, Test credited 4.3  --  5.3 6.6 4.5 4.5   < > 5.0 5.8 5.1 3.9*   ANEU  --   --   --   --   --  4.6  --  2.3  --  2.2 2.7 2.5 1.4*   AEOS  --   --   --   --   --  0.1  --  0.1  --  0.1 0.1 0.1 0.1   HGB 11.6* Canceled, Test credited 10.8* 11.2* 11.4* 12.4 12.2 12.7   < > 11.7 11.9 10.9* 12.5   MCV 88 Canceled, Test credited 90  --  91 92 88 89   < > 87 88 88 84    Canceled, Test credited 200  --  185 211 236 213   < > 215 243 243 272    < > = values in this interval not displayed.       Immune Globulin Studies    Recent Labs   Lab Test 01/17/18  1610   IGA 97       Metabolic Studies     Recent Labs   Lab Test 03/14/19  0217 03/11/19  0713 03/09/19  1120 12/12/18  1527 10/30/18  1606    139 136 137 138   POTASSIUM 5.0 4.0 3.9 3.9 4.0   CHLORIDE 107 109 104 105 106   CO2 19* 24 23 24 24   BUN 12 7 11 8 13   CR 0.71 0.70 0.77 0.67 0.79   GFRESTIMATED >90 >90 >90 >90 89       Hepatic Studies    Recent Labs   Lab Test 03/14/19  0217 03/09/19  1120 12/12/18  1527 10/30/18  1606 04/18/18  1724 01/08/18  1440   BILITOTAL 0.6 0.4 0.3 0.3 0.5 0.4   ALKPHOS 64 74 55 69 81 91   ALBUMIN 2.7* 3.3* 3.7 3.7 4.0 3.8   AST 44 20 34 34 26 26   ALT 28 26 33 35 28 34       Thyroid  Studies    Recent Labs   Lab Test 10/30/18  1606 11/26/16  1440   TSH 1.06 0.87       Microbiology:  Culture Micro   Date Value Ref Range Status   03/12/2019 Culture negative monitoring continues  Preliminary   03/12/2019 (A)  Preliminary    Moderate growth  Staphylococcus aureus  Susceptibility testing in progress     03/12/2019 Culture negative monitoring continues  Preliminary   03/12/2019 (A)  Preliminary    Moderate growth  Staphylococcus aureus  Susceptibility testing in progress     03/12/2019 Light growth  Normal skin leila    Preliminary   03/09/2019 (A)  Final    Moderate growth  Staphylococcus aureus  This isolate DOES NOT demonstrate inducible clindamycin resistance in vitro. Clindamycin   is susceptible and could be used when indicated, however, erythromycin is resistant and   should not be used.     03/09/2019 (A)  Final    Moderate growth  Coagulase negative Staphylococcus  Susceptibility testing not routinely done     12/05/2018 (A)  Final    >100,000 colonies/mL  Staphylococcus saprophyticus     10/31/2018 No growth  Final   09/06/2018 (A)  Final    50,000 to 100,000 colonies/mL  Staphylococcus aureus     09/06/2018 (A)  Final    50,000 to 100,000 colonies/mL  Coagulase negative Staphylococcus     11/29/2016 No Beta Streptococcus isolated  Final   04/19/2013 No Beta Streptococcus isolated  Final   01/01/2012   Final    Moderate growth Beta hemolytic Streptococcus group A  Critical Value called to and read back by Dr. Kuo ([facility]) 1059 1/2/12,hg   05/20/2011 No Beta Streptococcus isolated  Final       Urine Studies    Recent Labs   Lab Test 12/05/18  1348 10/31/18  1456 09/06/18  1345 04/18/18  1641 01/08/18  1553 11/03/17  1239   LEUKEST Small*  --  Small* Negative Negative Negative   WBCU 25-50* 10-25* 25-50* 1 0  --        Vancomycin Levels    Recent Labs   Lab Test 03/14/19  0217 03/12/19  0316   VANCOMYCIN 5.1 9.0     Hepatitis B Testing   Recent Labs   Lab Test 01/11/16  1218   HBCAB  Nonreactive   HEPBANG Nonreactive     Hepatitis C Testing     Hepatitis C Antibody   Date Value Ref Range Status   2016  NR Final    Nonreactive   Assay performance characteristics have not been established for newborns,   infants, and children         Vitals:    19 0524 19 0841 19 1550 19 2210   BP: 147/83 102/59 122/76 123/79   BP Location: Left arm Left arm Left arm Left arm   Pulse:  81     Resp: 16 16 16 16   Temp: 97  F (36.1  C) 97.4  F (36.3  C) 97.3  F (36.3  C) 97.4  F (36.3  C)   TempSrc: Oral  Oral Oral   SpO2: 99% 99% 95% 96%   Weight:       Height:         Temp (high/low/average during past 24 hours): Temp (24hrs), Av.7  F (36.5  C), Min:97.5  F (36.4  C), Max:98  F (36.7  C)      PHYSICAL EXAMINATION:  Vital signs as above  General: Pleasant young woman lying in bed. Her best friend is in the room with her.  HEENT: NCAT. EOMI. No conjunctival hemorrhage. No scleral icterus. Vision and hearing are grossly intact. No evidence of oral thrush on exam. An approximately 2 mm oral ulcer is present on the hard palate just to the right of the midline  Neck: Supple  Back: No costovertebral angle tenderness or spinal tenderness.   Lungs: Clear to auscultation. She occasionally coughed during the course of my time in the room.  Cardiac: RRR S1S2 with a grade I/VI systolic murmur best heard at the left sternal border  Abdomen: Normal bowel sounds, soft, non-tender  Extremities: Left lower extremity with wrap. Serosanguinous fluid is in the SARY drain. She can dorsiflex and plantarflex her left toes with normal strength. No edema of the right foot.  Neuro: AOX3. Able to discuss her medical condition today without apparent sadness.    Electronically signed by Duane Bejarano M.D.  3/12/2019 12:56 PM  --

## 2019-03-14 NOTE — PROGRESS NOTES
03/14/19 1100   Quick Adds   Type of Visit Initial PT Evaluation       Present no   Living Environment   Lives With significant other   Living Arrangements apartment   Home Accessibility no concerns   Transportation Anticipated family or friend will provide   Living Environment Comment PT: lives on third floor of apartment but has elevator to access. All needs met on one level. Pt is alone from 4am to 2pm during the day.    Self-Care   Usual Activity Tolerance good   Current Activity Tolerance good   Regular Exercise No   Equipment Currently Used at Home crutches   Activity/Exercise/Self-Care Comment PT: Pt has been using crutches for past 10 weeks from previous foot surgery   Functional Level Prior   Ambulation 1-->assistive equipment   Transferring 1-->assistive equipment   Toileting 0-->independent   Bathing 0-->independent   Communication 0-->understands/communicates without difficulty   Swallowing 0-->swallows foods/liquids without difficulty   Cognition 0 - no cognition issues reported   Fall history within last six months no   Which of the above functional risks had a recent onset or change? transferring;ambulation   Prior Functional Level Comment PT: mod I w/ crutches, ADLs   General Information   Onset of Illness/Injury or Date of Surgery - Date 03/09/19   Referring Physician Duane Ma MD   Patient/Family Goals Statement PT: to return home   Pertinent History of Current Problem (include personal factors and/or comorbidities that impact the POC) PT: Samara Oropeza is a 23 year old female with PMH including anxiety, Behcet's disease, fibromyalgia, GERD, migraines, seizure disorder, Tourette's and s/p L ankle arthroscopy and ATFL repair on 1/2 by Dr. Johnson presenting with increasing erythema, drainage and pain surrounding her surgical incision. Underwent I&D 3/12 with abscess found.   Precautions/Limitations fall precautions   Weight-Bearing Status - LUE full  weight-bearing   Weight-Bearing Status - RUE full weight-bearing   Weight-Bearing Status - LLE nonweight-bearing   Weight-Bearing Status - RLE full weight-bearing   General Observations PT: pt in supine when arrived, agreeable to PT eval. Friend present   General Info Comments PT: Activity orders: up ad anjali. SARY drain.   Cognitive Status Examination   Orientation orientation to person, place and time   Level of Consciousness alert   Follows Commands and Answers Questions 100% of the time   Personal Safety and Judgment intact   Memory intact   Pain Assessment   Patient Currently in Pain Yes, see Vital Sign flowsheet  (L foot, tolerable)   Integumentary/Edema   Integumentary/Edema other (describe)   Integumentary/Edema Comments PT: WFL, wrapped surgical site   Posture    Posture Not impaired   Range of Motion (ROM)   ROM Comment PT: WFL RLE    Strength   Strength Comments PT: WFL BLEs   Bed Mobility   Bed Mobility Comments SBA for supine>sit, SBA/Karthikeyan for sit>supine (needs either help to lift leg or lifts with UEs)   Transfer Skills   Transfer Comments mod I sit<>stand to crutches   Gait   Gait Comments 125' x2 w/ crutches, mod I   Balance   Balance Comments good w/ device   General Therapy Interventions   Planned Therapy Interventions gait training;transfer training;bed mobility training;risk factor education;home program guidelines;progressive activity/exercise   Clinical Impression   Criteria for Skilled Therapeutic Intervention yes, treatment indicated   PT Diagnosis PT: impaired functional mobility   Influenced by the following impairments PT: post-op precautions, pain   Functional limitations due to impairments PT: impaired gait   Clinical Presentation Stable/Uncomplicated   Clinical Presentation Rationale PT: current status, PLOF   Clinical Decision Making (Complexity) Low complexity   Therapy Frequency` daily  (once)   Predicted Duration of Therapy Intervention (days/wks) 1 day   Anticipated Equipment Needs at  "Discharge (none)   Anticipated Discharge Disposition Home with Assist   Risk & Benefits of therapy have been explained Yes   Patient, Family & other staff in agreement with plan of care Yes   [facility] TM \"6 Clicks\"   2016, Trustees of [facility], under license to [employer]Â®.  All rights reserved.   6 Clicks Short Forms Basic Mobility Inpatient Short Form   [facility] AM-PAC  \"6 Clicks\" V.2 Basic Mobility Inpatient Short Form   1. Turning from your back to your side while in a flat bed without using bedrails? 4 - None   2. Moving from lying on your back to sitting on the side of a flat bed without using bedrails? 4 - None   3. Moving to and from a bed to a chair (including a wheelchair)? 4 - None   4. Standing up from a chair using your arms (e.g., wheelchair, or bedside chair)? 4 - None   5. To walk in hospital room? 4 - None   6. Climbing 3-5 steps with a railing? 3 - A Little   Basic Mobility Raw Score (Score out of 24.Lower scores equate to lower levels of function) 23   Total Evaluation Time   Total Evaluation Time (Minutes) 5     "

## 2019-03-14 NOTE — PLAN OF CARE
[id number]  VS: VSS   O2: >90% on RA   Output: Adequate   Last BM: 3/7. MD aware. Chronic constipation. Refusing suppository. Senna and miralax today. Dulcolax tablet added and administered this afternoon.   Activity: NWB LLE; ind with crutches. Passed PT today   Up for meals? Yes   Skin: Incision L foot covered by cast   Pain: 10mg oxycodone   CMS: Intermittent tingling with positioning   Dressing: CDI   Diet: Regular   LDA: PICC SL, SARY patent   Equipment: IV pole, crutches   Plan: TBD   Additional Info: Pt gets nauseated without emesis with Vanco; PRN zofran before administration.

## 2019-03-14 NOTE — PLAN OF CARE
Discharge Planner PT   Patient plan for discharge: home  Current status: PT eval completed, treatment initiated. Pt has been using crutches for past 10 weeks with previous surgery, is mod I and steady for 125' x2. Pt instructed on stairs use w/ crutches however is able to avoid at home. Demos NWB on LLE consistently throughout session. Pt SBA for bed mobility.   Barriers to return to prior living situation: medical  Recommendations for discharge: home w/ assist PRN  Rationale for recommendations: Pt is mobilizing well, is safe to discharge home when medically ready. Will sign off.        Entered by: Natalie Miramontes 03/14/2019 11:46 AM    Physical Therapy Discharge Summary    Reason for therapy discharge:    Discharged to home.    Progress towards therapy goal(s). See goals on Care Plan in [facility] electronic health record for goal details.  Goals met    Therapy recommendation(s):    No further therapy is recommended.

## 2019-03-14 NOTE — PROGRESS NOTES
[facility] NURSE LIAISON NOTE   Met with patient at bedside and returned to meet with her mother as well per patient's request. Patient is expected to DC in next couple of days on IV antibiotics.  Currently on IV vancomycin so education provided on administration via home pump device.  Educated on [facility] role in Patient DC to home. Patient states her mom is an RN for [employer] working on [facility] and would be comfortable doing home infusion.     HP Mock Teach, educated mom on syringe-air removal.  Mom went through all steps of HP ABX administration, flushing and proper sequence of ABX step for administration. Mom has good understanding, and states she would be able to manage IV therapy at home for patient.  Mom does not live with patient but lives close to her and will be available to assist multiple times per day if needed for dosing.  Patient states her boyfriend would be willing to learn and patient may learn herself also.  Instructed patient that she or boyfriend would need to have additional teaching from home care nurse before doing self-administration. Patient verbalizes understanding.  Encouraged to call I for any questions, clarifications or problems.   Educated on Deliveries, importance of refrigerating medications and to remove medication from fridge to allow to warm to room temp before administration. Patient and mom engaged during this RN Visit in hospital room and state they will be comfortable doing home infusion.   Will continue to follow for final discharge planning.       JESSY Kinney  [facility] Nurse Liaison   [email or web address]  Cell: [phone] M, W-F  [facility] Main: [phone]

## 2019-03-14 NOTE — PROGRESS NOTES
"Orthopaedic Surgery Progress Note   March 11, 2019    Subjective: Pain stable. No fevers/chills. Drain with 20cc serosanguinous output past 18h.    Objective: /79 (BP Location: Left arm)   Pulse 81   Temp 97.4  F (36.3  C) (Oral)   Resp 16   Ht 1.6 m (5' 3\")   Wt 74.3 kg (163 lb 14.4 oz)   LMP 03/09/2019   SpO2 96%   BMI 29.03 kg/m      General: NAD, alert and oriented, cooperative with exam.   Cardio: RRR, extremities wwp.   Respiratory: Non-labored breathing.  MSK: LLE: Toes wwp, DP 2+, bcr in all toes.  +EHL/FHL. SILT SP/DP/Sa/Cat/T. Dressing with serous drainage, changed. No purulence.  Wound well appearing.     Intraop Cx: Staph aureus. Sens pending    Assessment and Plan: Samara Oropeza is a 23 year old female with PMH including anxiety, Behcet's disease, fibromyalgia, GERD, migraines, seizure disorder, Tourette's and s/p L ankle arthroscopy and ATFL repair on 1/2 by Dr. Johnson presenting with increasing erythema, drainage and pain surrounding her surgical incision. Underwent I&D 3/12 with abscess found.    Drain left in place, will remove tomorrow if scant output.      Internal Medicine Primary.  Activity: Up with assist until independent.  Weight bearing status: NWB LLE.  Should be in splint / CAM for support given lack of lateral ankle ligamentous support.  Pain management: Per primary.    Antibiotics/Tetanus: Per primary. Currently Vancomycin.   Anticoagulation/DVT prophylaxis: Per primary.    Labs: Monitor inflammatory markers.  Bracing/Splinting: Should be in splint / CAM for support given lack of lateral ankle ligamentous    Dressings: Keep clean, dry and intact.  Discontinue soaks.  Elevation: Elevate LLE on pillows to keep above the level of the heart as much as possible.   Cultures: Staph aureus, susceptibility pending  Follow-up: Dr Glover 2 weeks after discharge for wound check          Nelson Montenegro MD  Orthopaedic Surgery PGY-5  Pager:  [phone]      "

## 2019-03-14 NOTE — PROGRESS NOTES
"Patient was seen, case reviewed with nursing staff, orthopedics, infectious disease.    Patient states her left ankle pain is under fair control.  She notes continued mild nausea without emesis.  She is passing flatus but has not had a bowel movement.  She states she will be increasing her lactulose and MiraLAX from home supply    Afebrile, heart rate 90s, blood pressure normal    Sleepy, easily alerted, pleasant, fully oriented  Lungs clear  CV regular rate and rhythm  Abdomen, soft nontender  Left lower extremity wrapped    Intraoperative cultures from 3/12/2019 are growing staph aureus, sensitivities in progress      Assessment    Left ankle/foot abscess, status post surgical drainage 3/12/2019 with organism as noted above.  Patient remains on IV vancomycin which appears to be well tolerated.  She has been clinically stable.  Given extensive nature of abscess, ID recommends 6-week course of antibiotics.    Constipation, with no bowel movement for 7 days.  Abdominal exam is benign.  Critical importance of managing her bowels was discussed with the patient who states understanding.  She feels like she is \"going to have a bowel movement today\".    Nausea, possibly secondary to infection versus medications versus constipation.  Abdominal exam is benign      Plan  Antibiotics, per ID.  Will continue vancomycin for now  None weightbearing left leg.  Physical therapy  Bowel regimen with lactulose MiraLAX senna and Dulcolax suppository.  Continue oxycodone for pain.  Liver function tests  Discharge in 1-2 days      "

## 2019-03-14 NOTE — PHARMACY-VANCOMYCIN DOSING SERVICE
Pharmacy Vancomycin Note  Date of Service 2019  Patient's  1995   23 year old, female    Indication: Skin and Soft Tissue Infection, possible osteomyelitis per ID, treating this until verified   S. Aureus (vanc DEVON <=0.5), coagulase negative Staph  CRP 18.1   Goal Trough Level: 15-20 mg/L   Day of Therapy: Started 3/10  Current Vancomycin regimen:  1250 mg IV q12h    Current estimated CrCl = Estimated Creatinine Clearance: 119.7 mL/min (based on SCr of 0.7 mg/dL).    Creatinine for last 3 days  No results found for requested labs within last 72 hours.    Recent Vancomycin Levels (past 3 days)  3/12/2019:  3:16 AM Vancomycin Level 9.0 mg/L  3/14/2019:  2:17 AM Vancomycin Level 5.1 mg/L (missed one dose yesterday)     Vancomycin IV Administrations (past 72 hours)                   vancomycin (VANCOCIN) 1,250 mg in sodium chloride 0.9 % 250 mL intermittent infusion (mg) 1,250 mg New Bag 19 0340     1,250 mg New Bag 19 1536     1,250 mg Bolus 19 1645     1,250 mg New Bag  1541     1,250 mg New Bag  0252                Nephrotoxins and other renal medications (From now, onward)    Start     Dose/Rate Route Frequency Ordered Stop    03/10/19 1500  vancomycin (VANCOCIN) 1,250 mg in sodium chloride 0.9 % 250 mL intermittent infusion      1,250 mg  over 90 Minutes Intravenous EVERY 12 HOURS 03/10/19 1447               Contrast Orders - past 72 hours (72h ago, onward)    None          Interpretation of levels and current regimen:  Trough level is  Subtherapeutic    Has serum creatinine changed > 50% in last 72 hours: No    Urine output:  unable to determine    Renal Function: Stable    Plan:  1.  Change from 1250 q12h to 1000 mg q8h (13.5 mg/kg) since trough changed to 15-20 for possible osteomyelitis. Currently has IV PICC line in place.    2.  Pharmacy will check trough levels as appropriate in 1-3 Days.    3. Serum creatinine levels will be ordered daily for the first week of therapy and  at least twice weekly for subsequent weeks.      Niurka Simmons        .

## 2019-03-15 ENCOUNTER — TELEPHONE (OUTPATIENT)
Dept: ORTHOPEDICS | Facility: CLINIC | Age: 24
End: 2019-03-15

## 2019-03-15 ENCOUNTER — TELEPHONE (OUTPATIENT)
Dept: FAMILY MEDICINE | Facility: CLINIC | Age: 24
End: 2019-03-15

## 2019-03-15 ENCOUNTER — TELEPHONE (OUTPATIENT)
Facility: CLINIC | Age: 24
End: 2019-03-15

## 2019-03-15 LAB
CREAT SERPL-MCNC: 0.66 MG/DL (ref 0.52–1.04)
GFR SERPL CREATININE-BSD FRML MDRD: >90 ML/MIN/{1.73_M2}
VANCOMYCIN SERPL-MCNC: 10.2 MG/L

## 2019-03-15 PROCEDURE — 25000132 ZZH RX MED GY IP 250 OP 250 PS 637: Performed by: INTERNAL MEDICINE

## 2019-03-15 PROCEDURE — 25000128 H RX IP 250 OP 636: Performed by: INTERNAL MEDICINE

## 2019-03-15 PROCEDURE — 25000128 H RX IP 250 OP 636

## 2019-03-15 PROCEDURE — 99207 ZZC CDG-MDM COMPONENT: MEETS LOW - DOWN CODED: CPT | Performed by: INTERNAL MEDICINE

## 2019-03-15 PROCEDURE — 80202 ASSAY OF VANCOMYCIN: CPT | Performed by: INTERNAL MEDICINE

## 2019-03-15 PROCEDURE — 25800030 ZZH RX IP 258 OP 636: Performed by: INTERNAL MEDICINE

## 2019-03-15 PROCEDURE — 25000132 ZZH RX MED GY IP 250 OP 250 PS 637: Performed by: STUDENT IN AN ORGANIZED HEALTH CARE EDUCATION/TRAINING PROGRAM

## 2019-03-15 PROCEDURE — 36592 COLLECT BLOOD FROM PICC: CPT | Performed by: INTERNAL MEDICINE

## 2019-03-15 PROCEDURE — 99231 SBSQ HOSP IP/OBS SF/LOW 25: CPT | Performed by: INTERNAL MEDICINE

## 2019-03-15 PROCEDURE — 82565 ASSAY OF CREATININE: CPT | Performed by: INTERNAL MEDICINE

## 2019-03-15 PROCEDURE — 25000128 H RX IP 250 OP 636: Performed by: HOSPITALIST

## 2019-03-15 PROCEDURE — 12000001 ZZH R&B MED SURG/OB UMMC

## 2019-03-15 PROCEDURE — 99232 SBSQ HOSP IP/OBS MODERATE 35: CPT | Performed by: INTERNAL MEDICINE

## 2019-03-15 PROCEDURE — 25000132 ZZH RX MED GY IP 250 OP 250 PS 637: Performed by: ORTHOPAEDIC SURGERY

## 2019-03-15 RX ORDER — OXYCODONE HYDROCHLORIDE 5 MG/1
TABLET ORAL
Qty: 60 TABLET | Refills: 0 | Status: SHIPPED | OUTPATIENT
Start: 2019-03-15 | End: 2019-04-04

## 2019-03-15 RX ORDER — SENNOSIDES 8.6 MG
3 TABLET ORAL 2 TIMES DAILY
Qty: 240 TABLET | Refills: 3 | Status: SHIPPED | OUTPATIENT
Start: 2019-03-15 | End: 2019-10-30

## 2019-03-15 RX ORDER — SODIUM CHLORIDE 9 MG/ML
INJECTION, SOLUTION INTRAVENOUS
Status: DISPENSED
Start: 2019-03-15 | End: 2019-03-16

## 2019-03-15 RX ORDER — OXYCODONE HYDROCHLORIDE 5 MG/1
TABLET ORAL
Qty: 60 TABLET | Refills: 0 | Status: SHIPPED | OUTPATIENT
Start: 2019-03-15 | End: 2019-03-15

## 2019-03-15 RX ADMIN — OXYCODONE HYDROCHLORIDE 10 MG: 5 TABLET ORAL at 21:30

## 2019-03-15 RX ADMIN — MINERAL OIL AND WHITE PETROLATUM: 150; 830 OINTMENT OPHTHALMIC at 23:03

## 2019-03-15 RX ADMIN — SUCRALFATE 1 G: 1 SUSPENSION ORAL at 13:31

## 2019-03-15 RX ADMIN — GUANFACINE 3 MG: 2 TABLET ORAL at 09:23

## 2019-03-15 RX ADMIN — GUANFACINE 3 MG: 2 TABLET ORAL at 20:48

## 2019-03-15 RX ADMIN — SUCRALFATE 1 G: 1 SUSPENSION ORAL at 20:48

## 2019-03-15 RX ADMIN — VANCOMYCIN HYDROCHLORIDE 1000 MG: 1 INJECTION, SOLUTION INTRAVENOUS at 05:56

## 2019-03-15 RX ADMIN — SENNOSIDES 3 TABLET: 8.6 TABLET, FILM COATED ORAL at 20:48

## 2019-03-15 RX ADMIN — COLCHICINE 0.6 MG: 0.6 TABLET, FILM COATED ORAL at 09:23

## 2019-03-15 RX ADMIN — ONDANSETRON 4 MG: 2 INJECTION INTRAMUSCULAR; INTRAVENOUS at 05:57

## 2019-03-15 RX ADMIN — NORGESTIMATE AND ETHINYL ESTRADIOL 1 TABLET: KIT at 20:49

## 2019-03-15 RX ADMIN — SUCRALFATE 1 G: 1 SUSPENSION ORAL at 17:50

## 2019-03-15 RX ADMIN — DIPHENHYDRAMINE HYDROCHLORIDE 25 MG: 50 INJECTION INTRAMUSCULAR; INTRAVENOUS at 02:13

## 2019-03-15 RX ADMIN — GLYCERIN, PETROLATUM, PHENYLEPHRINE HCL, PRAMOXINE HCL: 144; 2.5; 10; 15 CREAM TOPICAL at 20:51

## 2019-03-15 RX ADMIN — ACETAMINOPHEN 1000 MG: 500 TABLET ORAL at 11:14

## 2019-03-15 RX ADMIN — OXYCODONE HYDROCHLORIDE 5 MG: 5 TABLET ORAL at 13:32

## 2019-03-15 RX ADMIN — OXYCODONE HYDROCHLORIDE 5 MG: 5 TABLET ORAL at 00:43

## 2019-03-15 RX ADMIN — ACETAMINOPHEN 1000 MG: 500 TABLET ORAL at 01:56

## 2019-03-15 RX ADMIN — AMITRIPTYLINE HYDROCHLORIDE 25 MG: 25 TABLET, FILM COATED ORAL at 20:48

## 2019-03-15 RX ADMIN — ONDANSETRON 4 MG: 2 INJECTION INTRAMUSCULAR; INTRAVENOUS at 15:00

## 2019-03-15 RX ADMIN — LACTULOSE 20 G: 10 SOLUTION ORAL at 10:20

## 2019-03-15 RX ADMIN — VANCOMYCIN HYDROCHLORIDE 1500 MG: 500 INJECTION, POWDER, LYOPHILIZED, FOR SOLUTION INTRAVENOUS at 22:58

## 2019-03-15 RX ADMIN — SUCRALFATE 1 G: 1 SUSPENSION ORAL at 09:24

## 2019-03-15 RX ADMIN — OXYCODONE HYDROCHLORIDE 5 MG: 5 TABLET ORAL at 05:56

## 2019-03-15 RX ADMIN — DIPHENHYDRAMINE HYDROCHLORIDE 25 MG: 50 INJECTION INTRAMUSCULAR; INTRAVENOUS at 22:44

## 2019-03-15 RX ADMIN — COLCHICINE 0.6 MG: 0.6 TABLET, FILM COATED ORAL at 20:48

## 2019-03-15 RX ADMIN — DIPHENHYDRAMINE HYDROCHLORIDE 25 MG: 50 INJECTION INTRAMUSCULAR; INTRAVENOUS at 15:04

## 2019-03-15 RX ADMIN — HYDROXYZINE HYDROCHLORIDE 50 MG: 25 TABLET ORAL at 00:04

## 2019-03-15 RX ADMIN — DIPHENHYDRAMINE HYDROCHLORIDE 25 MG: 50 INJECTION INTRAMUSCULAR; INTRAVENOUS at 05:57

## 2019-03-15 RX ADMIN — ACETAMINOPHEN 1000 MG: 500 TABLET ORAL at 18:58

## 2019-03-15 RX ADMIN — OXYCODONE HYDROCHLORIDE 5 MG: 5 TABLET ORAL at 17:50

## 2019-03-15 RX ADMIN — GLYCERIN, PETROLATUM, PHENYLEPHRINE HCL, PRAMOXINE HCL: 144; 2.5; 10; 15 CREAM TOPICAL at 15:11

## 2019-03-15 RX ADMIN — VANCOMYCIN HYDROCHLORIDE 1500 MG: 500 INJECTION, POWDER, LYOPHILIZED, FOR SOLUTION INTRAVENOUS at 15:10

## 2019-03-15 RX ADMIN — OXYCODONE HYDROCHLORIDE 10 MG: 5 TABLET ORAL at 09:23

## 2019-03-15 RX ADMIN — ONDANSETRON 4 MG: 2 INJECTION INTRAMUSCULAR; INTRAVENOUS at 22:45

## 2019-03-15 RX ADMIN — SENNOSIDES 3 TABLET: 8.6 TABLET, FILM COATED ORAL at 09:23

## 2019-03-15 RX ADMIN — HYDROXYZINE HYDROCHLORIDE 25 MG: 25 TABLET ORAL at 13:31

## 2019-03-15 ASSESSMENT — ACTIVITIES OF DAILY LIVING (ADL)
ADLS_ACUITY_SCORE: 14

## 2019-03-15 NOTE — PROGRESS NOTES
Care Coordinator Progress Note    Admission Date/Time:  3/9/2019  Attending MD:  Harpreet Corrales MD    Data  Chart reviewed, discussed with interdisciplinary team.   Patient was admitted for:    Cellulitis and abscess of left leg  Ankle abscess  Pruritus  Irritable bowel syndrome with both constipation and diarrhea  Fibromyalgia.    Concerns with insurance coverage for discharge needs: None.  Current Living Situation: Patient lives with adult .  Support System: Involved  Services Involved: Home Infusion  Transportation at Discharge: Family or friend will provide  Transportation to Medical Appointments:   - Name of caregiver: chandan Stearns  Barriers to Discharge: NA    Coordination of Care and Referrals: Provided patient/family with options for Home Infusion.        Assessment  [facility] to see patient for home abx infusions. [facility] has done patient and family teaching. Patient to follow up with [facility]. Vancomycin level to be drawn on 3/17/2019. Weekly labs ordered. No further discharge concerns. RNCC available as needed.    [facility]   Floor 2, [address]  ?[address] [place] [address]. [place] [address]   Appointments: [phone]  Fax: [phone]    Message left for [facility] to contact patient for follow up ID appointment for 2 weeks post hospital visit.       Plan  Anticipated Discharge Date:  3/16/2019  Anticipated Discharge Plan:  Home with home infusion.    Janny Viera RN, BSN  Care Coordinator, 8A  Phone [phone]  Pager [phone]

## 2019-03-15 NOTE — PROGRESS NOTES
"Orthopaedic Surgery Progress Note   March 11, 2019    Subjective: Pain stable. No fevers/chills. Drain with 5cc serosanguinous output past shift.    Objective: /84 (BP Location: Left arm)   Pulse 106   Temp 96.7  F (35.9  C) (Oral)   Resp 16   Ht 1.6 m (5' 3\")   Wt 74.3 kg (163 lb 14.4 oz)   LMP 03/09/2019   SpO2 97%   BMI 29.03 kg/m      General: NAD, alert and oriented, cooperative with exam.   Cardio: RRR, extremities wwp.   Respiratory: Non-labored breathing.  MSK: LLE: Toes wwp, DP 2+, bcr in all toes.  +EHL/FHL. SILT SP/DP/T. Dressing with no drainage, changed. No purulence.  Wound well appearing.     Intraop Cx: Staph aureus. Sens pending    Assessment and Plan: Samara Oropeza is a 23 year old female with PMH including anxiety, Behcet's disease, fibromyalgia, GERD, migraines, seizure disorder, Tourette's and s/p L ankle arthroscopy and ATFL repair on 1/2 by Dr. Johnson presenting with increasing erythema, drainage and pain surrounding her surgical incision. Underwent I&D 3/12 with abscess found.    Wound appears well.  Drain removed today.      Internal Medicine Primary.  Activity: Up with assist until independent.  Weight bearing status: NWB LLE.  Should be in splint / CAM for support given lack of lateral ankle ligamentous support.  Pain management: Per primary.    Antibiotics/Tetanus: Per primary. Currently Vancomycin. Planning for discharge with 6wk IV therapy.  Anticoagulation/DVT prophylaxis: Per primary.    Labs: Monitor inflammatory markers.  Bracing/Splinting: Should be in splint / CAM for support given lack of lateral ankle ligamentous    Dressings: Keep clean, dry and intact.  Discontinue soaks.  Elevation: Elevate LLE on pillows to keep above the level of the heart as much as possible.   Cultures: Staph aureus  Follow-up: Dr Glover 2 weeks after discharge for wound check          Nelson Montenegro MD  Orthopaedic Surgery PGY-5  Pager:  [phone]      "

## 2019-03-15 NOTE — PHARMACY-VANCOMYCIN DOSING SERVICE
Pharmacy Vancomycin Note  Date of Service March 15, 2019  Patient's  1995   23 year old, female    Indication: Osteomyelitis   Cx Staph aureus (DEVON <=0.5) and coagulase negative Staph   Goal Trough Level: 15-20 mg/L  Day of Therapy: Started 3/10  Current Vancomycin regimen:  1000 mg IV q8h    Current estimated CrCl = Estimated Creatinine Clearance: 127 mL/min (based on SCr of 0.66 mg/dL).    Creatinine for last 3 days  3/14/2019:  2:17 AM Creatinine 0.71 mg/dL  3/15/2019:  8:22 AM Creatinine 0.66 mg/dL    Recent Vancomycin Levels (past 3 days)  3/14/2019:  2:17 AM Vancomycin Level 5.1 mg/L  3/15/2019:  1:24 PM Vancomycin Level 10.2 mg/L    Vancomycin IV Administrations (past 72 hours)                   vancomycin (VANCOCIN) 1000 mg in dextrose 5% 200 mL PREMIX (mg) 1,000 mg New Bag 03/15/19 0556     1,000 mg New Bag 19 2118     1,000 mg New Bag  1230    vancomycin (VANCOCIN) 1,250 mg in sodium chloride 0.9 % 250 mL intermittent infusion (mg) 1,250 mg New Bag 19 0340     1,250 mg New Bag 19 1536     1,250 mg Bolus 19 1645     1,250 mg New Bag  1541                Nephrotoxins and other renal medications (From now, onward)    Start     Dose/Rate Route Frequency Ordered Stop    03/15/19 1400  vancomycin (VANCOCIN) 1,500 mg in sodium chloride 0.9 % 250 mL intermittent infusion      1,500 mg  over 90 Minutes Intravenous EVERY 8 HOURS 03/15/19 1408               Contrast Orders - past 72 hours (72h ago, onward)    None          Interpretation of levels and current regimen:  Trough level is  Subtherapeutic    Has serum creatinine changed > 50% in last 72 hours: No    Urine output:  unable to determine    Renal Function: Stable    Plan:  1.  Change from 1000 mg q8h to 1500 mg q8h (20.1 mg/kg)   2.  Pharmacy will check trough levels as appropriate in 1-3 Days.    3. Serum creatinine levels will be ordered a minimum of twice weekly.      Niurka Simmons        .

## 2019-03-15 NOTE — PROGRESS NOTES
This is a recent snapshot of the patient's [facility] medical record.  For current drug dose and complete information and questions, call [phone] or In Basket pool, fv home infusion ([id number])  CSN Number:  [id number]

## 2019-03-15 NOTE — PLAN OF CARE
VSS. Tachycardic, baseline per pt and pt asymptomatic. A & O x 4. Denies SOB. Lung sounds clear. L foot has cast w/ ace wrap, UTV under cast w/ ace wrap. SARY drain patent. L foot elevated, and ice applied. Pt reported L foot pain, scheduled tylenol and prn oxycodone given. Pt reports intermittent nausea, prn IV zofran given. R PICC SL, IV abx infused. Pt requested to have IV vancomycin rate slowed at 100 mL/hr. Up with assistance from sister who stayed overnight in room w/ crutches. Voiding spontaneously without difficulty. LBM 3/7/19, bowel sounds active in all 4 quadrants. Pt has bowel meds in AM. Prn atarax given per pt request. Pt reported feeling itching, prn IV benedryl given, and pt requested to have prn IV benedryl given w/ IV vancomycin. Call light in reach, able to make needs known. NPO at midnight pending wound check in AM.

## 2019-03-15 NOTE — TELEPHONE ENCOUNTER
PA Initiation    Medication:   Insurance Company: [employer] - Phone [phone] Fax [phone]  Pharmacy Filling the Rx: [facility] - [place], [place] - [address]  Filling Pharmacy Phone: [phone]  Filling Pharmacy Fax: [phone]  Start Date: 3/15/2019  Reference #: [id number]    [facility]  [address]  [address] [address]   [email or web address]  [email or web address]   Phone: [phone]  Pager: [phone]  Fax: [phone]

## 2019-03-15 NOTE — PROGRESS NOTES
Patient was seen, case reviewed with nursing staff, orthopedics, family    Patient notes fair pain control on oxycodone.  She has past physical therapy, and is able to navigate nonweightbearing status left leg with crutches.  She did have a painful, large, hard bowel movement this afternoon after agreeing to take her prescribed dose of lactulose.  She does feel tired and notes some back pain following this.    She has an occasional nonproductive cough, denies chest pain or shortness of breath    Afebrile  Vital signs stable  Alert, fully oriented, affect blunted  Respiratory rate 12, unlabored  Lungs clear without wheezing rhonchi, stridor  CV regular rate and rhythm without murmurs  Abdomen soft, nondistended nontender  Left leg in splint.  Drain has been pulled.        Assessment    Left ankle/foot abscess, status post surgical drainage 3/12/2019 with culture positive for MSSA.  Patient remains on Vanco for an anticipated 6-week course (start date would be March 12 when she had surgery)     Constipation secondary to medications, immobility, reluctance to take full dose of prescribed laxatives.  Improved this afternoon.    Nonproductive cough, likely secondary to upper airway irritation secondary to intubation.  Exam is unremarkable.    Plan  Continue current medication regimen.  Critical importance of aggressive bowel monitoring and adherence to regimen discussed with patient on several occasions today.  Discharge planned for tomorrow morning

## 2019-03-15 NOTE — TELEPHONE ENCOUNTER
Panel Management Review      Patient has the following on her problem list: None      Composite cancer screening  Chart review shows that this patient is due/due soon for the following None  Summary:    Patient is due/failing the following:   Chlamydia Screening and PHYSICAL and Flu Vaccine    Action needed:   Patient needs office visit for physical/chlamydia screening/flu vaccine.    Type of outreach:    Sent [employer] message.    Questions for provider review:    None                                                                                                                                    Jo Oleary    Care Management Coordinator - [facility]

## 2019-03-15 NOTE — PROGRESS NOTES
"INFECTIOUS DISEASE FOLLOW-UP    NAME: Samara Oropeza  MRN:  [id number]  AGE:  23 year old            :  1995    PRIMARY CARE PROVIDER:  Sonja Abreu  Date of Admission:   3/9/2019  Consult Requester:  Harpreet Corrales MD      REASON FOR SEEING: Staphylococcal infection of foot and ankle    INTERVAL EVENTS:     1. Improvement of right foot on examination with decrease in swelling and in erythema  2. Afebrile  3. Stable creatinine on vancomycin  4. Patient had what is reported as a hard bowel movement.    IMPRESSION AND SUGGESTIONS:      1. Staphylococcus aureus osteomyelitis.     The patient should receive a minimum of 6 weeks of iv vancomycin (given her allergies to both penicillins and cephalosporins) with \"day zero\" as the date of surgery, 3/12/2019.   2. Hypoalbuminemia    Suggest nutritional supplements  3. History of prolonged bleeding per vagina    This should be evaluated, either as an inpatient or an outpatient    I spent 30 minutes discussing the management and care of the patient with the patient and with her mother as well as with other family members. In addition, I discussed the need to promptly seek medical attention if the PICC line has evidence of a clot, if the area around the PICC line is red, or is painful. I educated the patient, who is receiving antibiotics, and her family members on the topic of infections due to Clostridioides (formerly Clostridium) difficile. This included that if the patient develops diarrhea, especially if it is associated with fever or abdominal pain, she is to seek medical attention without delay. I discussed both the potential severity of the infection, including the possibility that it is life-threatening, and that after treatment there may be recurrences due to spores. In addition, I discussed the need to promptly seek medical attention both while the patient is receiving antibiotics and weeks or even months after the completion of the " antibiotics and to inform the physician at that time of her history of receiving antibiotics.      ALLERGIES:  Amoxil [penicillins]; Bee venom; Contrast dye; Orange fruit [citrus]; Pineapple; Reglan [metoclopramide]; Ace inhibitors; Amitiza [lubiprostone]; Amoxicillin-pot clavulanate; Midazolam; No clinical screening - see comments; Versed; Adhesive tape; Azithromycin; Cephalexin; and Keflex [cephalexin-fd&c yellow #6]    CURRENT MEDICATIONS:  Current Facility-Administered Medications   Medication     acetaminophen (TYLENOL) tablet 1,000 mg     albuterol (PROAIR HFA/PROVENTIL HFA/VENTOLIN HFA) 108 (90 Base) MCG/ACT inhaler 2 puff     amitriptyline (ELAVIL) tablet 25 mg     artificial tears ophthalmic ointment     benzocaine (ORAJEL MAXIMUM STRENGTH) 20 % gel     bisacodyl (DULCOLAX) EC tablet 10 mg     bisacodyl (DULCOLAX) Suppository 10 mg     colchicine (COLCYRS) tablet 0.6 mg     cyproheptadine (PERIACTIN) tablet 4 mg     dicyclomine (BENTYL) tablet 20 mg     diphenhydrAMINE (BENADRYL) capsule 50 mg     diphenhydrAMINE (BENADRYL) injection 25-50 mg     guanFACINE (TENEX) tablet 3 mg     hydrOXYzine (ATARAX) tablet 25 mg    Or     hydrOXYzine (ATARAX) tablet 50 mg     hypromellose (ARTIFICIAL TEARS) 0.5 % ophthalmic solution 1 drop     lactulose (CHRONULAC) solution 20 g     lidocaine (LMX4) cream     lidocaine 1 % 0.1-1 mL     linaclotide (LINZESS) capsule 145 mcg     magic mouthwash suspension (diphenhydramine, lidocaine, aluminum-magnesium & simethicone)     melatonin tablet 1 mg     naloxone (NARCAN) injection 0.1-0.4 mg     nitroFURantoin macrocrystal-monohydrate (MACROBID) capsule 100 mg     norgestimate-ethinyl estradiol (ORTHO-CYCLEN/SPRINTEC) 0.25-35 MG-MCG per tablet 1 tablet     ondansetron (ZOFRAN-ODT) ODT tab 4 mg    Or     ondansetron (ZOFRAN) injection 4 mg     oxyCODONE (ROXICODONE) tablet 5-10 mg     polyethylene glycol (MIRALAX/GLYCOLAX) Packet 17 g     pramox-pe-glycerin-petrolatum (PREPARATION  H) cream     sennosides (SENOKOT) tablet 3 tablet     sodium chloride (PF) 0.9% PF flush 10 mL     sodium chloride (PF) 0.9% PF flush 10-20 mL     sodium chloride 0.9 % infusion     sodium chloride 0.9% infusion     sucralfate (CARAFATE) suspension 1 g     vancomycin (VANCOCIN) 1,500 mg in sodium chloride 0.9 % 250 mL intermittent infusion       REVIEW OF SYSTEMS:      LABORATORY RESULTS:  Inflammatory Markers    Recent Labs   Lab Test 03/13/19  1358 03/11/19  0713 03/10/19  0643 03/09/19  1120 04/18/18  1724 12/21/17  1821 04/21/17  1249 04/14/17  1351 11/06/16  1341 03/11/16  1350 11/18/15  1453 11/14/15  1300 05/06/15  0220 12/31/14  1932 12/08/14  2140   SED  --   --   --   --   --  13  --   --  4 5 6 6 6 6 10   CRP 18.1* 29.4* 51.8* 84.0* <2.9 12.0* <2.9 <2.9 <2.9 <2.9 <2.9 <2.9 <2.9 <2.9 8.3*       Hematology Studies    Recent Labs   Lab Test 03/13/19  1643 03/13/19  1358 03/11/19  0713 03/10/19  1734 03/10/19  0643 03/09/19  1120 12/12/18  1527 10/30/18  1606  04/18/18  1724 01/17/18  1610 01/08/18  1550 12/21/17  1821   WBC 6.4 Canceled, Test credited 4.3  --  5.3 6.6 4.5 4.5   < > 5.0 5.8 5.1 3.9*   ANEU  --   --   --   --   --  4.6  --  2.3  --  2.2 2.7 2.5 1.4*   AEOS  --   --   --   --   --  0.1  --  0.1  --  0.1 0.1 0.1 0.1   HGB 11.6* Canceled, Test credited 10.8* 11.2* 11.4* 12.4 12.2 12.7   < > 11.7 11.9 10.9* 12.5   MCV 88 Canceled, Test credited 90  --  91 92 88 89   < > 87 88 88 84    Canceled, Test credited 200  --  185 211 236 213   < > 215 243 243 272    < > = values in this interval not displayed.       Immune Globulin Studies    Recent Labs   Lab Test 01/17/18  1610   IGA 97       Metabolic Studies     Recent Labs   Lab Test 03/15/19  0822 03/14/19  0217 03/11/19  0713 03/09/19  1120 12/12/18  1527 10/30/18  1606   NA  --  139 139 136 137 138   POTASSIUM  --  5.0 4.0 3.9 3.9 4.0   CHLORIDE  --  107 109 104 105 106   CO2  --  19* 24 23 24 24   BUN  --  12 7 11 8 13   CR 0.66 0.71 0.70  0.77 0.67 0.79   GFRESTIMATED >90 >90 >90 >90 >90 89       Hepatic Studies    Recent Labs   Lab Test 03/14/19  0217 03/09/19  1120 12/12/18  1527 10/30/18  1606 04/18/18  1724 01/08/18  1440   BILITOTAL 0.6 0.4 0.3 0.3 0.5 0.4   ALKPHOS 64 74 55 69 81 91   ALBUMIN 2.7* 3.3* 3.7 3.7 4.0 3.8   AST 44 20 34 34 26 26   ALT 28 26 33 35 28 34       Thyroid Studies    Recent Labs   Lab Test 10/30/18  1606 11/26/16  1440   TSH 1.06 0.87       Microbiology:  Culture Micro   Date Value Ref Range Status   03/12/2019 Culture negative monitoring continues  Preliminary   03/12/2019 (A)  Preliminary    Moderate growth  Staphylococcus aureus  Oxacillin susceptibilities in progress  3.15     03/12/2019 Culture negative monitoring continues  Preliminary   03/12/2019 (A)  Preliminary    Moderate growth  Staphylococcus aureus  Oxacillin susceptibilities in progress  3.15     03/12/2019 Light growth  Normal skin leila    Preliminary   03/09/2019 (A)  Final    Moderate growth  Staphylococcus aureus  This isolate DOES NOT demonstrate inducible clindamycin resistance in vitro. Clindamycin   is susceptible and could be used when indicated, however, erythromycin is resistant and   should not be used.     03/09/2019 (A)  Final    Moderate growth  Coagulase negative Staphylococcus  Susceptibility testing not routinely done     12/05/2018 (A)  Final    >100,000 colonies/mL  Staphylococcus saprophyticus     10/31/2018 No growth  Final   09/06/2018 (A)  Final    50,000 to 100,000 colonies/mL  Staphylococcus aureus     09/06/2018 (A)  Final    50,000 to 100,000 colonies/mL  Coagulase negative Staphylococcus     11/29/2016 No Beta Streptococcus isolated  Final   04/19/2013 No Beta Streptococcus isolated  Final   01/01/2012   Final    Moderate growth Beta hemolytic Streptococcus group A  Critical Value called to and read back by Dr. Kuo (URPER) 1059 1/2/12,hg   05/20/2011 No Beta Streptococcus isolated  Final       Urine Studies    Recent Labs    Lab Test 18  1348 10/31/18  1456 18  1345 18  1641 18  1553 17  1239   LEUKEST Small*  --  Small* Negative Negative Negative   WBCU 25-50* 10-25* 25-50* 1 0  --        Vancomycin Levels    Recent Labs   Lab Test 03/15/19  1324 19  0217 19  0316   VANCOMYCIN 10.2 5.1 9.0     Hepatitis B Testing   Recent Labs   Lab Test 16  1218   HBCAB Nonreactive   HEPBANG Nonreactive     Hepatitis C Testing     Hepatitis C Antibody   Date Value Ref Range Status   2016  NR Final    Nonreactive   Assay performance characteristics have not been established for newborns,   infants, and children         Vitals:    19 1400 19 1706 03/15/19 0007 03/15/19 0842   BP:  122/73 124/84 140/85   BP Location:  Left arm Left arm Left arm   Pulse:   106    Resp:  16 16 16   Temp: 97.7  F (36.5  C) 98.3  F (36.8  C) 96.7  F (35.9  C) 97  F (36.1  C)   TempSrc: Oral Oral Oral Oral   SpO2:  100% 97% 99%   Weight:       Height:         Temp (high/low/average during past 24 hours): Temp (24hrs), Av.9  F (36.1  C), Min:96.7  F (35.9  C), Max:97  F (36.1  C)    PHYSICAL EXAMINATION:  Vital signs as above  General: Patent appears to be more comfortable and is lying in bed with her left lower extremity elevated and in a wrap  Lungs: Clear to auscultation  Extremities: Left lower extremity: sutures present. There is notably less swelling of the ankle and dorsum of the foot than was the case prior to surgery. There is less erythema. The dorsum of the foot and lower ankle are tender.  Neuro: AOX3. Able to move all four extremities.    Electronically signed by Duane Bejarano M.D.  3/15/2019 5:30 PM  --

## 2019-03-16 ENCOUNTER — HOME INFUSION (PRE-WILLOW HOME INFUSION) (OUTPATIENT)
Dept: PHARMACY | Facility: CLINIC | Age: 24
End: 2019-03-16

## 2019-03-16 VITALS
SYSTOLIC BLOOD PRESSURE: 116 MMHG | HEART RATE: 106 BPM | HEIGHT: 63 IN | DIASTOLIC BLOOD PRESSURE: 75 MMHG | RESPIRATION RATE: 16 BRPM | BODY MASS INDEX: 29.04 KG/M2 | TEMPERATURE: 96.3 F | WEIGHT: 163.9 LBS | OXYGEN SATURATION: 100 %

## 2019-03-16 LAB
BACTERIA SPEC CULT: ABNORMAL
CREAT SERPL-MCNC: 0.63 MG/DL (ref 0.52–1.04)
GFR SERPL CREATININE-BSD FRML MDRD: >90 ML/MIN/{1.73_M2}
SPECIMEN SOURCE: ABNORMAL
SPECIMEN SOURCE: ABNORMAL

## 2019-03-16 PROCEDURE — 99238 HOSP IP/OBS DSCHRG MGMT 30/<: CPT | Performed by: INTERNAL MEDICINE

## 2019-03-16 PROCEDURE — 25000132 ZZH RX MED GY IP 250 OP 250 PS 637: Performed by: INTERNAL MEDICINE

## 2019-03-16 PROCEDURE — 25000132 ZZH RX MED GY IP 250 OP 250 PS 637: Performed by: ORTHOPAEDIC SURGERY

## 2019-03-16 PROCEDURE — 25000128 H RX IP 250 OP 636: Performed by: INTERNAL MEDICINE

## 2019-03-16 PROCEDURE — 25000132 ZZH RX MED GY IP 250 OP 250 PS 637: Performed by: STUDENT IN AN ORGANIZED HEALTH CARE EDUCATION/TRAINING PROGRAM

## 2019-03-16 PROCEDURE — 25800030 ZZH RX IP 258 OP 636: Performed by: INTERNAL MEDICINE

## 2019-03-16 PROCEDURE — 82565 ASSAY OF CREATININE: CPT | Performed by: INTERNAL MEDICINE

## 2019-03-16 PROCEDURE — 25000132 ZZH RX MED GY IP 250 OP 250 PS 637: Performed by: HOSPITALIST

## 2019-03-16 PROCEDURE — 99207 ZZC CDG-CODE INCORRECT PER BILLING BASED ON TIME: CPT | Performed by: INTERNAL MEDICINE

## 2019-03-16 PROCEDURE — 36592 COLLECT BLOOD FROM PICC: CPT | Performed by: INTERNAL MEDICINE

## 2019-03-16 RX ADMIN — OXYCODONE HYDROCHLORIDE 10 MG: 5 TABLET ORAL at 00:47

## 2019-03-16 RX ADMIN — OXYCODONE HYDROCHLORIDE 5 MG: 5 TABLET ORAL at 11:14

## 2019-03-16 RX ADMIN — VANCOMYCIN HYDROCHLORIDE 1500 MG: 500 INJECTION, POWDER, LYOPHILIZED, FOR SOLUTION INTRAVENOUS at 06:37

## 2019-03-16 RX ADMIN — GUANFACINE 3 MG: 2 TABLET ORAL at 08:40

## 2019-03-16 RX ADMIN — SUCRALFATE 1 G: 1 SUSPENSION ORAL at 08:40

## 2019-03-16 RX ADMIN — SENNOSIDES 3 TABLET: 8.6 TABLET, FILM COATED ORAL at 08:41

## 2019-03-16 RX ADMIN — HYDROXYZINE HYDROCHLORIDE 25 MG: 25 TABLET ORAL at 09:02

## 2019-03-16 RX ADMIN — OXYCODONE HYDROCHLORIDE 5 MG: 5 TABLET ORAL at 06:34

## 2019-03-16 RX ADMIN — DIPHENHYDRAMINE HYDROCHLORIDE 50 MG: 25 CAPSULE ORAL at 06:34

## 2019-03-16 RX ADMIN — ONDANSETRON 4 MG: 2 INJECTION INTRAMUSCULAR; INTRAVENOUS at 06:34

## 2019-03-16 RX ADMIN — ACETAMINOPHEN 1000 MG: 500 TABLET ORAL at 06:33

## 2019-03-16 RX ADMIN — COLCHICINE 0.6 MG: 0.6 TABLET, FILM COATED ORAL at 08:40

## 2019-03-16 RX ADMIN — POLYETHYLENE GLYCOL 3350 17 G: 17 POWDER, FOR SOLUTION ORAL at 08:47

## 2019-03-16 ASSESSMENT — ACTIVITIES OF DAILY LIVING (ADL)
ADLS_ACUITY_SCORE: 14

## 2019-03-16 NOTE — DISCHARGE SUMMARY
Admit Date:     03/13/2019   Discharge Date:           Samara Oropeza is a 23-year-old female with a history of Bechet's disease, fibromyalgia, GERD and irritable bowel syndrome who was admitted to the hospital for evaluation of worsening left ankle and foot swelling and drainage from her previous incision site.  The patient is status post ankle arthroscopy with repair in early January by Dr. Nunez of Podiatry.  Postoperatively, she had had difficulties with wound healing, had been on oral antibiotics as an outpatient in February.  Two days prior to admission she had developed increasing pain, redness, swelling of the foot and ankle associated with increased drainage from the old incision.  Her symptoms failed to improve with oral clindamycin, prompting referral to the ER.  The following medical issues were addressed.   1.  Left ankle pain, swelling, redness, drainage from recent surgical incision.  Plain films of her foot on admission revealed soft tissue swelling, but no acute bony abnormality.  The patient was seen by Orthopedics on the day of admission who initially recommended nonsurgical management.  The patient was initially started on IV clindamycin which was changed to IV vancomycin on the day after admission.  Subsequent to that, the cultures of the drainage from her old surgical incision did grow Staph aureus sensitive, pansensitive, as well as coagulase-negative staph.  The patient was monitored closely by Orthopedics.  She had no significant improvement in the pain and swelling following the initiation of vancomycin.  An ultrasound of the foot was therefore obtained on March 12, which revealed fluid collection consistent with abscess.  The patient was subsequently taken to the OR by Dr. Glover on March 12, for I&D of the left foot.  Intraoperative findings were of an abscess measuring 7.5 x 4 cm, extending down to bone.  Operative notes indicate that the abscess extended down to the level of  the tibiotalar and subtalar joints.  Retained sutures placed at the time of the ligamentous reconstruction were removed.  The cultures from the surgical I&D did reveal Staph aureus, pansensitive.  The patient was maintained on vancomycin at the recommendation of Infectious Disease in view of a history of ALLERGY TO PENICILLIN and KEFLEX and in view of the outpatient failure of clindamycin.  The patient's left ankle pain gradually has improved since surgery.  She is nonweightbearing, is using crutches at all times.  The drain was removed on the day prior to discharge.  She continues to have a slight serous drainage from the drain site which has been monitored by Orthopedics.  She has been afebrile with normal vital signs.  White count on 03/13 was 6400.  CRP on 03/13 was 18.1.  The patient will require a 6-week course of IV vancomycin in view of the risk of bone involvement.  She will follow up with Orthopedics in approximately 2 weeks and will also be followed by Infectious Disease as an outpatient.  Other problems addressed to this hospitalization included:   2.  Constipation.  The patient reported having no bowel movement for several days prior to admission and had remained constipated during the bulk of her hospitalization.  She does have chronic constipation but had been reluctant to take full doses of laxatives.  She finally did have a large bowel movement on the day prior to discharge.  The absolute importance of maintaining compliance with her bowel regimen was discussed on multiple occasions with the patient.   3.  Chronic pain.  The patient with history of Behcet's disease as well as fibromyalgia.  Pain appeared to be adequate amount adequate managed with oxycodone.  Instructions for tapering oxycodone after discharge have been written.   4.  Nausea, which appears to be a chronic problem.  The patient was resumed on prior to admission Carafate, which was somewhat helpful.  Nausea did improve significantly  after she had a large bowel movement.      On the day of discharge, the patient was alert, fully oriented, appeared comfortable.  She was afebrile.  Heart rates were around 100.  She was ambulating in her room with her with crutches.  Orthopedic exam of the foot over the left foot revealed slight serous drainage from recent surgical drain site.  Lungs were clear.  Cardiac exam reveals a regular rate and rhythm without murmurs.  Abdomen was soft, nondistended, nontender.  The patient was alert, fully oriented.      DISCHARGE DIAGNOSES:   1.  Left ankle abscess, status post left ankle arthroscopy with repair in January of this year, or cultures positive for Staph aureus, MSSA, with clinical improvement following surgical I&D and IV antibiotic therapy.  As above, Infectious Disease recommended a 6-week course of vancomycin.   2.  Acute on chronic constipation secondary to irritable bowel syndrome, multiple medications with anticholinergic effects, immobility and narcotic use during this hospitalization.  Bowel status has improved with the resumption of full doses of her usual laxatives.   3.  Bechet's disease.   4.  GERD.   5.  Fibromyalgia.   6.  Chronic anxiety.      DISCHARGE MEDICATIONS:  Benadryl 25 mg 3 times a day p.r.n. itching which the patient attributes to vancomycin, oxycodone 5-10 mg every 4 hours p.r.n. pain for 2 days, then 1 tablet every 4 hours as needed for 2 days, then 1 tablet every 6 hours as needed for 2 days, then 1 tablet every 8 hours as needed for 2 days, then 1 tablet twice daily as needed for pain and then to be discontinued.  She was given 60 oxycodone 5 mg tablets.  Vancomycin 1500 mg IV q.8h (this is the correct dose based on trough levels) with close monitoring of vancomycin levels to be obtained as outpatient.  Amitriptyline 25 mg daily, which represents a decrease from her prior to admission dose of 50 mg at bedtime.  Senna 3 tablets twice daily.  Albuterol inhaler on a p.r.n. basis,  aspirin 81 mg daily.  Benzocaine gel to nasal ulcers  4 times daily as needed.  Valisone cream twice daily, Temovate twice daily for folliculitis.  Colchicine 0.6 mg twice daily for Bechet disease.  Cyproheptadine 4 mg at bedtime for nightmares, dicyclomine 20 mg 4 times a day as needed for abdominal cramps.  Tenex 3 tablets twice daily for Tourette's syndrome.  Hyoscyamine 0.125-0.25 mg 4 times a day p.r.n. cramping, ibuprofen 600 mg q.6h. p.r.n. pain.  Lactulose 30 mL 3 times a day as needed for constipation (the patient was urged to take full dose until her bowel movements are regular), Magic Mouthwash p.r.n., linaclotide 145 mcg capsules every morning.  Macrobid 100 mg daily, which she takes for UTI suppression.  Ortho-Cyclen 1 tablet daily.  Omeprazole 40 mg daily.   Zofran 8 mg every 8 hours p.r.n. nausea, MiraLax 17 grams 3 times a day.  Carafate 1 gram 4 times a day.      The patient will follow up with Dr. Glover in approximately 2 weeks.  Infectious Disease followup is being arranged as well.  She will have a vancomycin trough level performed the day after discharge.  She will need weekly labs including a BMP, CBC, sed rate and CRP.         DAGMAR MAC MD             D: 2019   T: 2019   MT: ROGE      Name:     LUCINA BAH   MRN:      [id number]        Account:        [id number]   :      1995           Admit Date:     2019                                  Discharge Date:       Document: [id number]

## 2019-03-16 NOTE — PLAN OF CARE
VS: VSS. Pt denies SOB and Chest pain   O2: >90% on RA   Output: Voiding without difficulty or pain, up to the bathroom independently   Last BM: 3/15/19   Activity: Independent, uses crutches, demonstrated steady gait   Up for meals? No   Skin: Intact, ex incision   Pain: Pain tolerable, managed with oxycodone and tylenol.   CMS: Intact, ex numbness and tingling in left foot.    Dressing: New dressing placed this AM, CDI.   Diet: Regular, tolerating well, denies nausea    LDA: PICC in R arm   Equipment: IV pole, crutches   Plan: To be discharged home today with home infusion.   Additional Info:

## 2019-03-16 NOTE — PROGRESS NOTES
Transition Planning Update    D:  Received update from [facility] that Ms. Oropeza will be ready for discharge today.  [facility] was updated about plans of care to discharge today.  [facility] will deliver IV medications to patients home and a home care RN will follow up with patient this afternoon in her home setting.  A/P:  Patient will discharge today.  Please refer to discharge orders prn.  Patient will follow up in clinic as designated in discharge orders.    Ada Sheridan, B.S.N., P.H.N.,R.N.         Pager

## 2019-03-16 NOTE — PLAN OF CARE
Patient pleasant and cooperative. PICC in Rt arm. Splint and compression wrap on L leg and ankle which patient complained of being too tight and was loosened by RN. Left leg elevated on wedge pillow. Cap. Refill less than 3 sec bilaterally in lower extremities. Independent to the bathroom with her crutches. IV vanco given q8h w/ benadryl and Zofran. Taking Tylenol and Oxycodone for pain. Slept well most of the night. Planned discharge home with home infusions later today.

## 2019-03-16 NOTE — PROGRESS NOTES
"Orthopaedic Surgery Progress Note     Subjective: Pain stable. No fevers/chills.     Objective: /75 (BP Location: Left arm)   Pulse 106   Temp 96.3  F (35.7  C) (Axillary)   Resp 16   Ht 1.6 m (5' 3\")   Wt 74.3 kg (163 lb 14.4 oz)   LMP 03/09/2019   SpO2 100%   BMI 29.03 kg/m      General: NAD, alert and oriented, cooperative with exam.   Cardio: RRR, extremities wwp.   Respiratory: Non-labored breathing.  MSK: LLE: Toes wwp, DP 2+, bcr in all toes.  +EHL/FHL. SILT SP/DP/T. Dressing with no drainage, changed. Scant serous drainage from drain site. Wound sutures intact, minimal swelling.      Intraop Cx: Staph aureus.  Coag neg staph,     Assessment and Plan: Samara Oropeza is a 23 year old female with PMH including anxiety, Behcet's disease, fibromyalgia, GERD, migraines, seizure disorder, Tourette's and s/p L ankle arthroscopy and ATFL repair on 1/2 by Dr. Johnson presenting with increasing erythema, drainage and pain surrounding her surgical incision. Underwent I&D 3/12 with abscess found.    Wound scant drainage from drain site. Wound intact. Dressings changed       Internal Medicine Primary.  Activity: Up with assist until independent.  Weight bearing status: NWB LLE.  Should be in splint / CAM for support given lack of lateral ankle ligamentous support. Applied in clinic  Pain management: Per primary.    Antibiotics/Tetanus: Per primary. Currently Vancomycin. Planning for discharge with 6wk IV therapy.  Anticoagulation/DVT prophylaxis: Per primary.    Labs: Monitor inflammatory markers.  Bracing/Splinting: Should be in splint / CAM boot at clinic follow up     Dressings: Keep clean, dry and intact.  Discontinue soaks.  Elevation: Elevate LLE on pillows to keep above the level of the heart as much as possible.   Cultures: Staph aureus/coag negative staph  Follow-up: Dr Glover 2 weeks after discharge for wound check. Okay from Ortho standpoint for patient to discharge. Discharge orders " updated with wound care, clinic follow up etc. Pt given dressings to take home however should leave current ones in place if no drainage.       Discussed with Dr Glover.     Brandi Driver, APRN, CNP  Department of Orthopedic Surgery  [facility]  [phone]

## 2019-03-16 NOTE — PLAN OF CARE
Pt. discharged at 1330 to home, was accompanied by mother, and left with personal belongings. Pt. received complete discharge paperwork and all medications as filled by discharge pharmacy. Pt. was given times of last dose for all discharge medications in writing on discharge medication sheets. Discharge teaching included all medication, pain management, activity restrictions, dressing changes, and signs and symptoms of infection. Dressing supplies sent home. Pt discharging home with [facility], pt has been contacted by home infusion today and is aware they will meet her at home this evening. Pt. to follow up with Dr. Glover in 2 weeks. Pt. had no further questions at the time of discharge and no unmet needs were identified.

## 2019-03-16 NOTE — PLAN OF CARE
VS:       Pt A/O X 4. Afebrile. VSS. Lungs-clear bilaterally with both anterior and posterior. IS encouraged. Denies nausea, shortness of breath, and chest pain.     Output:       Bowels- active in all four quadrants. Last BM 3/15. Pt had large BM this shift, digital removal had to be performed by RN due to hard stool. Pt complaining of pain at rectum. MD updated and hemorrhoid cream ordered and provided to pt. Voids spontaneously without   difficulty in the bathroom.      Activity:       Pt up in room and to bathroom with assist of standby and crutches.     Skin:   Incision to LLE, otherwise intact.     Pain:       Has pain in the LLE and given prn Oxycodone, prn Atarax, scheduled Tylenol, ICE applied, and is tolerating well.      CMS:       CMS and Neuro's are intact. Pt has intermittent tingling with positioning.      Dressing:       LLE incisional dressing/splint is clean, dry, and intact. Dressing removed by infectious disease this shift, and new dressing applied, splint is in place.      Diet:       Pt is on a regular diet and appetite was good this shift.       LDA:       PICC is patent in the right arm and SL between IV antibiotics. Dressing changed this shift after pt complaining of some pain near the insertion site, pt stated insertion site felt better after dressing change.       Equipment:       Pt declined PCDs. LLE elevated on foam elevator. Bilateral heels are elevated off the bed.     Plan:       Pt is able to make needs known and the call light is within the pt's reach. Continue to monitor.       Additional Info:       Pt to discharge to home tomorrow with IV antibiotics.

## 2019-03-17 ENCOUNTER — PATIENT OUTREACH (OUTPATIENT)
Dept: CARE COORDINATION | Facility: CLINIC | Age: 24
End: 2019-03-17

## 2019-03-17 LAB
CREAT SERPL-MCNC: 0.63 MG/DL (ref 0.52–1.04)
GFR SERPL CREATININE-BSD FRML MDRD: >90 ML/MIN/{1.73_M2}
VANCOMYCIN SERPL-MCNC: 24.8 MG/L

## 2019-03-17 PROCEDURE — 82565 ASSAY OF CREATININE: CPT | Performed by: INTERNAL MEDICINE

## 2019-03-17 PROCEDURE — 80202 ASSAY OF VANCOMYCIN: CPT | Performed by: INTERNAL MEDICINE

## 2019-03-18 ENCOUNTER — PATIENT OUTREACH (OUTPATIENT)
Dept: CARE COORDINATION | Facility: CLINIC | Age: 24
End: 2019-03-18

## 2019-03-18 ENCOUNTER — ANESTHESIA EVENT (OUTPATIENT)
Dept: INTERVENTIONAL RADIOLOGY/VASCULAR | Facility: CLINIC | Age: 24
End: 2019-03-18

## 2019-03-18 ENCOUNTER — TELEPHONE (OUTPATIENT)
Dept: FAMILY MEDICINE | Facility: CLINIC | Age: 24
End: 2019-03-18

## 2019-03-18 ENCOUNTER — HOME INFUSION (PRE-WILLOW HOME INFUSION) (OUTPATIENT)
Dept: PHARMACY | Facility: CLINIC | Age: 24
End: 2019-03-18

## 2019-03-18 DIAGNOSIS — G89.4 CHRONIC PAIN SYNDROME: Primary | ICD-10-CM

## 2019-03-18 DIAGNOSIS — B37.31 YEAST INFECTION OF THE VAGINA: Primary | ICD-10-CM

## 2019-03-18 RX ORDER — FLUCONAZOLE 150 MG/1
150 TABLET ORAL ONCE
Qty: 1 TABLET | Refills: 0 | Status: ON HOLD | OUTPATIENT
Start: 2019-03-18 | End: 2019-04-01

## 2019-03-18 NOTE — TELEPHONE ENCOUNTER
Called patient, informed patient that requested prescription has been sent. Patient verbalized understanding    Ksenia Medina, JESSY  Triage Nurse

## 2019-03-18 NOTE — TELEPHONE ENCOUNTER
Sonja,    Spoke with patient. She states that her symptoms currently are: really itchy, kind of inflammed and really red, and a little bit of white discharge.    Per chart review, pt taking vancomycin 1500 mg IV via [facility] home infusion.    Please advise. Cued diflucan if recommending and pharmacy.    Criselda Sanabria RN  [facility]

## 2019-03-18 NOTE — LETTER
Health Care Home - Access Care Plan    About Me  Patient Name:  Samara Bah    YOB: 1995  Age:                             23 year old   Codie MRN:            [id number] Telephone Information:   Home Phone [phone]   Mobile [phone]       Address:    [address] [place] [address] Email address:  [email or web address]      Emergency Contact(s)  Name Relationship Lgl Grd Work Phone Home Phone Mobile Phone   1. MARIXA CLARKE* Mother No  [phone]   2. MALGORZATA BAH Father No  [phone]   3. HOLLY CLARKE* Step parent No  [phone]   4. KENIA PERRY* Grandparent No  [phone]             Health Maintenance: Routine Health maintenance Reviewed: Due/Overdue   Health Maintenance Due   Topic Date Due     PREVENTIVE CARE VISIT  08/11/2010     INFLUENZA VACCINE (1) 09/01/2018     CHLAMYDIA SCREENING  10/24/2018       My Access Plan  Medical Emergency 915   Questions or concerns during clinic hours Primary Clinic Line, I will call the clinic directly: [facility] [phone]   24 Hour Appointment Line [phone] or  [phone] [phone]) (toll free)   24 Hour Nurse Line [phone] (toll free)   Questions or concerns outside clinic hours 24 Hour Appointment Line, I will call the after-hours on-call line:   [place] [phone] or [phone]) (toll-free)   Preferred Urgent Care  Not assessed    Preferred Hospital  Not assessed    Preferred Pharmacy [facility] - [address]     Behavioral Health Crisis Line The National Suicide Prevention Lifeline at 1-514.131.3399 or 911     My Care Team Members  Patient Care Team       Relationship Specialty Notifications Start End    Sonja Abreu APRN CNP PCP - General Nurse Practitioner  11/3/17     Phone: [phone] Fax: [phone]          [address] [place] [address] [facility] [address]    Jennifer Acevedo MD MD Orthopedics  7/16/13     Phone: [phone] Fax: [phone]         [address] [facility] [address]    Lilliana Miramontes APRN CNP Nurse Practitioner Nurse Practitioner  5/11/15     Phone: [phone] Fax: [phone]         [address] [place], [place] [address] [address]    Cristy Russell, RN Nurse Coordinator Neurology Admissions 6/18/15     General Neurology    Phone: [phone] Pager: [phone] Fax: [phone]       Austen Marquez MD MD Rheumatology Admissions 9/16/15     Phone: [phone] Fax: [phone]         [address] [facility] [address] [facility] [address]    Umer Heaton MD MD Ophthalmology  1/11/16     Phone: [phone] Fax: [phone]         [address] [facility] [address] [facility] [address]    Suzy Harman MD MD Family Medicine - Sports Medicine  1/19/16     Phone: [phone] Fax: [phone]         [address] [place] [address]    Esau Elliott MD MD Dermatology  9/14/16     Phone: [phone] Fax: [phone]         [facility] [address]    Frederick Bender MD MD Physical Medicine and Rehab  4/4/17     Phone: [phone] Fax: [phone]         [address] [address]    Vidya Bryson, RN Clinic Care Coordinator Physical Medicine and Rehab  4/4/17     Phone: [phone] Pager: [phone] Fax: [phone]        [facility] [address] [facility] [address] [facility] [address]    Marcelle Richardson, PT Physical Therapist Physical Medicine and Rehab  4/4/17     Phone: [phone] Fax: [phone]         [address] [place] [address] [address] [facility] [address]    Cata Hurst, NP Nurse Practitioner Nurse Practitioner Psych/Mental Health  6/27/17     Phone: [phone] Fax: [phone]         [place] [address] [place] [address]    Sonja Abreu APRN CNP  Assigned PCP   7/2/17     Phone: [phone] Fax: [phone]         [address] [place] [address] [facility] [address]    HUSSEIN Lynn MD MD Cardiology  2/27/18     Phone: [phone] Fax: [phone]         [address] [facility] [address] [facility] [address]    Magda Narayan BSW Clinic Care Coordinator Primary Care - CC  3/18/19      Care Coordination [facility]    Maeve Purcell BSW Clinic Care Coordinator Primary Care - CC  3/18/19     Phone: [phone]                My Medical and Care Information  Problem List   Patient Active Problem List   Diagnosis     Tics - Tourette syndrome     IBS (irritable bowel syndrome)     Allergic rhinitis     Generalized anxiety disorder     Knee pain     Concussion     Milk protein intolerance     Headaches due to old head injury     Behcet's disease (H)     Migraine     Spell of shaking     On colchicine therapy     Palpitations     Fibromyalgia     Rheumatoid arthritis of multiple sites without rheumatoid factor (H)     Raynaud's disease without gangrene     Chronic abdominal pain     Patellofemoral instability     PTSD (post-traumatic stress disorder)     Cervical dystonia     Acute left ankle pain     Cervical pain     Major depressive disorder, recurrent episode, moderate (H)     Chronic pain syndrome     Convulsions, unspecified convulsion type (H)     Transient alteration of awareness     Vitamin D deficiency     Mobile cecum     Spells of decreased attentiveness     Pelvic floor weakness     Somatic symptom disorder     Anxiety state     Aphthous ulcer     Cough     Dysthymic disorder     Gastroparesis     GERD (gastroesophageal reflux disease)     Displacement of lumbar intervertebral disc without myelopathy     Intestinal malabsorption     Iron deficiency associated with nonfamilial restless legs syndrome     Other specified symptom associated with female genital organs     Enthesopathy of hip region     Tourette's syndrome     Cellulitis

## 2019-03-18 NOTE — LETTER
[facility] CARE COORDINATION    March 18, 2019    Samara Oropeza  [address]   [place] [address]      Dear Samara,    I am a clinic care coordinator who works with EVI Cardona CNP at [facility].  I wanted to introduce myself and provide you with my contact information so that you can call me with questions or concerns about your health care. I am currently covering for Maeve Purcell, your clinic .  Below is a description of clinic care coordination and how I can further assist you.     The clinic care coordinator is a registered nurse and/or  who understand the health care system. The goal of clinic care coordination is to help you manage your health and improve access to the [facility] system in the most efficient manner. The registered nurse can assist you in meeting your health care goals by providing education, coordinating services, and strengthening the communication among your providers. The  can assist you with financial, behavioral, psychosocial, chemical dependency, counseling, and/or psychiatric resources.    Please feel free to contact Maeve Purcell at [phone] with any questions or concerns. We at [facility] are focused on providing you with the highest-quality healthcare experience possible and that all starts with you.     Sincerely,     ADRAINA Durham, MSW    [facility]  [phone]    On behalf of ADRIANA Martinez,      Enclosed: I have enclosed a copy of a 24 Hour Access Plan. This has helpful phone numbers for you to call when needed. Please keep this in an easy to access place to use as needed.

## 2019-03-18 NOTE — PROGRESS NOTES
[facility]: Post-Discharge Note  SITUATION                                                      Admission:    Admission Date: 03/09/19   Reason for Admission: Left ankle pain, swelling, redness, drainage from recent surgical incision  Discharge:   Discharge Date: 03/16/19  Discharge Diagnosis: Left ankle pain, swelling, redness, drainage from recent surgical incision  Discharge Service: Hospitalist     BACKGROUND                                                        Samara Oropeza is a 23-year-old female with a history of Bechet's disease, fibromyalgia, GERD and irritable bowel syndrome who was admitted to the hospital for evaluation of worsening left ankle and foot swelling and drainage from her previous incision site.  The patient is status post ankle arthroscopy with repair in early January by Dr. Nunez of Podiatry.  Postoperatively, she had had difficulties with wound healing, had been on oral antibiotics as an outpatient in February.  Two days prior to admission she had developed increasing pain, redness, swelling of the foot and ankle associated with increased drainage from the old incision.  Her symptoms failed to improve with oral clindamycin, prompting referral to the ER.  The following medical issues were addressed.     ASSESSMENT      Discharge Assessment  Patient reports symptoms are: Unchanged  Does the patient have all of their medications?: Yes  Does patient know what their new medications are for?: Yes  Does patient have a follow-up appointment scheduled?: Yes  Does patient have any other questions or concerns?: Yes(Patient thinks she may have a yeast infection (vaginal itching). She was going to contact her PCP about getting an RX for this. )    Post-op  Did the patient have surgery or a procedure: Yes  Fever: No  Chills: No  Eating & Drinking: eating and drinking without complaints/concerns(Patient doesn't have much of an appetite but is staying well hydrated. )  PO  Intake: regular diet  Bowel Function: normal  Urinary Status: voiding without complaint/concerns    PLAN                                                      Outpatient Plan:      The patient will follow up with Dr. Glover in approximately 2 weeks.  Infectious Disease followup is being arranged as well.  She will have a vancomycin trough level performed the day after discharge.  She will need weekly labs including a BMP, CBC, sed rate and CRP.     Future Appointments   Date Time Provider Department Center   3/26/2019 10:00 AM Ernestina Patel [facility]   4/30/2019 10:20 AM Alejandrina Hernandez MD [facility]   7/9/2019 10:30 AM Austen Marquez MD [facility]   10/30/2019  1:00 PM Joseph De La Torre MD [facility]           Jeannette Camilo, [facility]

## 2019-03-18 NOTE — PROGRESS NOTES
This is a recent snapshot of the patient's [facility] medical record.  For current drug dose and complete information and questions, call [phone] or In Basket pool, fv home infusion ([id number])  CSN Number:  [id number]

## 2019-03-18 NOTE — TELEPHONE ENCOUNTER
Prior Authorization Approval    Authorization Effective Date: 3/15/2019  Authorization Expiration Date: 4/15/2019  Medication: Oxycodone 5mg tablets  Approved Dose/Quantity: 54 tablets for 10 days   Reference #: [id number]   Insurance Company: [employer] - Phone [phone] Fax [phone]  Expected CoPay: $5.90     CoPay Card Available: No    Foundation Assistance Needed: n/a  Which Pharmacy is filling the prescription (Not needed for infusion/clinic administered): [facility] [place], [place] - [address] [place] [address]  Pharmacy Notified: Yes  Patient Notified: No    [facility] Discharge [facility]acy Liaison     [facility]  [address], [place] [address]   [email or web address]  [email or web address]   Phone: [phone]  Pager: [phone]  Fax: [phone]

## 2019-03-18 NOTE — PROGRESS NOTES
Clinic Care Coordination Contact--Social Work Initial Call/Assessment   Care Team Conversations    Psychosocial: Patient left message for clinic MACIEJ Mcfarlane.  Per Patient's message, she is sorry she could not call sooner, stating she had two surgeries and just got out of the hospital Friday 3/15/2019.  She reports her pain is a lot worse and she would like to discuss disability process.  This SW is covering for Maeve in her absence.  MACIEJ returned call to Patient.  SW introduced self, stating she is covering for Maeve.  Patient reported that she has discussed desire with PCP to apply for disability benefits.  SW stated she could assist if Patient would like. SW offered to send information on the disability process to Patient, we discussed Disability Hub (formerly Disability Linkage Line) which has a wealth of information on the disability process and next steps to start this process.  Patient stated she would like to have Maeve help her with the process as discussed with PCP.  SW mentioned Maeve is out until later this week, that she could send out resources in the interim and have Maeve call her later this week, if she is agreeable to this.  Patient stated she was.  MACIEJ verified Patient's address and will send Disability Hub brochure with web site information as noted above and contact information for disability resources.    SW did not have opportunity to do assessment, Patient seemed to want to talk to Maeve.  This was a brief call.      Plan:   1) MACIEJ sent introduction letter and Access Plan  2) MACIEJ sent Disability Hub brochure to Patient  3) MACIEJ will route to [facility], clinic MACIEJ, have her call Patient at end of the week per her request  4) MACIEJ will route to PCP     Magda Narayan, ADRIANA, MSW   [facility]   [phone]  3/19/2019 10:16 AM

## 2019-03-19 ENCOUNTER — HOME INFUSION (PRE-WILLOW HOME INFUSION) (OUTPATIENT)
Dept: PHARMACY | Facility: CLINIC | Age: 24
End: 2019-03-19

## 2019-03-19 LAB
ALT SERPL W P-5'-P-CCNC: 37 U/L (ref 0–50)
AST SERPL W P-5'-P-CCNC: NORMAL U/L (ref 0–45)
BACTERIA SPEC CULT: NORMAL
BACTERIA SPEC CULT: NORMAL
BASOPHILS # BLD AUTO: 0 10E9/L (ref 0–0.2)
BASOPHILS NFR BLD AUTO: 0.2 %
CREAT SERPL-MCNC: 0.71 MG/DL (ref 0.52–1.04)
CRP SERPL-MCNC: 5.8 MG/L (ref 0–8)
DIFFERENTIAL METHOD BLD: NORMAL
EOSINOPHIL # BLD AUTO: 0.2 10E9/L (ref 0–0.7)
EOSINOPHIL NFR BLD AUTO: 2.5 %
ERYTHROCYTE [DISTWIDTH] IN BLOOD BY AUTOMATED COUNT: 13 % (ref 10–15)
ERYTHROCYTE [SEDIMENTATION RATE] IN BLOOD BY WESTERGREN METHOD: 9 MM/H (ref 0–20)
GFR SERPL CREATININE-BSD FRML MDRD: >90 ML/MIN/{1.73_M2}
HCT VFR BLD AUTO: 35.7 % (ref 35–47)
HGB BLD-MCNC: 11.7 G/DL (ref 11.7–15.7)
IMM GRANULOCYTES # BLD: 0 10E9/L (ref 0–0.4)
IMM GRANULOCYTES NFR BLD: 0.2 %
LYMPHOCYTES # BLD AUTO: 2 10E9/L (ref 0.8–5.3)
LYMPHOCYTES NFR BLD AUTO: 34.5 %
Lab: NORMAL
Lab: NORMAL
MCH RBC QN AUTO: 29 PG (ref 26.5–33)
MCHC RBC AUTO-ENTMCNC: 32.8 G/DL (ref 31.5–36.5)
MCV RBC AUTO: 89 FL (ref 78–100)
MONOCYTES # BLD AUTO: 0.3 10E9/L (ref 0–1.3)
MONOCYTES NFR BLD AUTO: 5.1 %
NEUTROPHILS # BLD AUTO: 3.4 10E9/L (ref 1.6–8.3)
NEUTROPHILS NFR BLD AUTO: 57.5 %
NRBC # BLD AUTO: 0 10*3/UL
NRBC BLD AUTO-RTO: 0 /100
PLATELET # BLD AUTO: 251 10E9/L (ref 150–450)
RBC # BLD AUTO: 4.03 10E12/L (ref 3.8–5.2)
SPECIMEN SOURCE: NORMAL
SPECIMEN SOURCE: NORMAL
VANCOMYCIN SERPL-MCNC: 3.2 MG/L
WBC # BLD AUTO: 5.9 10E9/L (ref 4–11)

## 2019-03-19 PROCEDURE — 80202 ASSAY OF VANCOMYCIN: CPT | Performed by: INTERNAL MEDICINE

## 2019-03-19 PROCEDURE — 85652 RBC SED RATE AUTOMATED: CPT | Performed by: INTERNAL MEDICINE

## 2019-03-19 PROCEDURE — 84460 ALANINE AMINO (ALT) (SGPT): CPT | Performed by: INTERNAL MEDICINE

## 2019-03-19 PROCEDURE — 84450 TRANSFERASE (AST) (SGOT): CPT | Performed by: INTERNAL MEDICINE

## 2019-03-19 PROCEDURE — 82565 ASSAY OF CREATININE: CPT | Performed by: INTERNAL MEDICINE

## 2019-03-19 PROCEDURE — 86140 C-REACTIVE PROTEIN: CPT | Performed by: INTERNAL MEDICINE

## 2019-03-19 PROCEDURE — 85025 COMPLETE CBC W/AUTO DIFF WBC: CPT | Performed by: INTERNAL MEDICINE

## 2019-03-19 NOTE — PROGRESS NOTES
This is a recent snapshot of the patient's [facility] medical record.  For current drug dose and complete information and questions, call [phone] or In Basket pool, fv home infusion ([id number])  CSN Number:  [id number]

## 2019-03-20 ENCOUNTER — HOME INFUSION (PRE-WILLOW HOME INFUSION) (OUTPATIENT)
Dept: PHARMACY | Facility: CLINIC | Age: 24
End: 2019-03-20

## 2019-03-20 ENCOUNTER — OFFICE VISIT (OUTPATIENT)
Dept: ORTHOPEDICS | Facility: CLINIC | Age: 24
End: 2019-03-20
Payer: COMMERCIAL

## 2019-03-20 DIAGNOSIS — M79.672 LEFT FOOT PAIN: Primary | ICD-10-CM

## 2019-03-20 RX ORDER — TIZANIDINE HYDROCHLORIDE 4 MG/1
4 CAPSULE, GELATIN COATED ORAL 3 TIMES DAILY
Qty: 60 CAPSULE | Refills: 1 | Status: SHIPPED | OUTPATIENT
Start: 2019-03-20 | End: 2019-04-03 | Stop reason: SINTOL

## 2019-03-20 NOTE — LETTER
3/20/2019    RE: Samara Oropeza  [address]  [place] [address]     Samara Oropeza is a pleasant 23-year-old female.  I was on call last week when she presented with a foot infection following lateral ligament reconstruction performed at an outside hospital.  Was draining purulent material.  We admitted to the hospital.  Try to treat her with IV antibiotics that this failed to really turn the corner and in light of this we obtained an ultrasound which showed a fluid collection and so we made plans for surgical irrigation debridement.  This was completed 1 week prior.  She is tolerated this well.  She has a PICC line in with being dosed with vancomycin.  She is off all narcotics that she is complaining of some muscle spasms within her foot.    Examination today shows the incision to be clean and dry.  There is no active redness there does not appear to be a fluid collection the swelling is markedly improved from last week.  Sutures are clearly intact.    Clinical assessment: Era-incisional foot infection following lateral ligament reconstruction left foot    Plan: I long discussion today with the patient at this time I think she is doing well.  She can continue dosing that with IV antibiotics as directed by infectious disease.  We will leave her sutures in for at least 2 more weeks.  I can see her again in 2 weeks for suture removal or she can follow-up with her outside treating podiatrist.  I have already reached out to his clinic and he is aware.  I have given her a prescription for tizanidine to try to help with the spasms in her foot.  We will give her a Cam walker if she is allowed to weight-bear in a Cam walker though I like to keep her off it when she is not in the Cam walker.  She can shower.    Micha Glover MD

## 2019-03-20 NOTE — PROGRESS NOTES
This is a recent snapshot of the patient's [facility] medical record.  For current drug dose and complete information and questions, call [phone] or In Basket pool, fv home infusion ([id number])  CSN Number:  [id number]

## 2019-03-20 NOTE — PROGRESS NOTES
Samara Oropeza is a pleasant 23-year-old female.  I was on call last week when she presented with a foot infection following lateral ligament reconstruction performed at an outside hospital.  Was draining purulent material.  We admitted to the hospital.  Try to treat her with IV antibiotics that this failed to really turn the corner and in light of this we obtained an ultrasound which showed a fluid collection and so we made plans for surgical irrigation debridement.  This was completed 1 week prior.  She is tolerated this well.  She has a PICC line in with being dosed with vancomycin.  She is off all narcotics that she is complaining of some muscle spasms within her foot.    Examination today shows the incision to be clean and dry.  There is no active redness there does not appear to be a fluid collection the swelling is markedly improved from last week.  Sutures are clearly intact.    Clinical assessment: Era-incisional foot infection following lateral ligament reconstruction left foot    Plan: I long discussion today with the patient at this time I think she is doing well.  She can continue dosing that with IV antibiotics as directed by infectious disease.  We will leave her sutures in for at least 2 more weeks.  I can see her again in 2 weeks for suture removal or she can follow-up with her outside treating podiatrist.  I have already reached out to his clinic and he is aware.  I have given her a prescription for tizanidine to try to help with the spasms in her foot.  We will give her a Cam walker if she is allowed to weight-bear in a Cam walker though I like to keep her off it when she is not in the Cam walker.  She can shower.

## 2019-03-21 ENCOUNTER — PATIENT OUTREACH (OUTPATIENT)
Dept: CARE COORDINATION | Facility: CLINIC | Age: 24
End: 2019-03-21

## 2019-03-21 ENCOUNTER — OFFICE VISIT (OUTPATIENT)
Dept: FAMILY MEDICINE | Facility: CLINIC | Age: 24
End: 2019-03-21
Payer: COMMERCIAL

## 2019-03-21 VITALS
SYSTOLIC BLOOD PRESSURE: 99 MMHG | OXYGEN SATURATION: 100 % | RESPIRATION RATE: 14 BRPM | DIASTOLIC BLOOD PRESSURE: 65 MMHG | HEART RATE: 104 BPM | TEMPERATURE: 98 F

## 2019-03-21 DIAGNOSIS — M35.2 BEHCET'S DISEASE (H): ICD-10-CM

## 2019-03-21 DIAGNOSIS — F41.1 GAD (GENERALIZED ANXIETY DISORDER): ICD-10-CM

## 2019-03-21 DIAGNOSIS — N94.6 DYSMENORRHEA: Primary | ICD-10-CM

## 2019-03-21 DIAGNOSIS — L03.116 CELLULITIS OF LEFT LOWER EXTREMITY: ICD-10-CM

## 2019-03-21 LAB
BASOPHILS # BLD AUTO: 0 10E9/L (ref 0–0.2)
BASOPHILS NFR BLD AUTO: 0.1 %
BUN SERPL-MCNC: 7 MG/DL (ref 7–30)
CREAT SERPL-MCNC: 0.68 MG/DL (ref 0.52–1.04)
CRP SERPL-MCNC: 16.4 MG/L (ref 0–8)
DIFFERENTIAL METHOD BLD: ABNORMAL
EOSINOPHIL # BLD AUTO: 0.3 10E9/L (ref 0–0.7)
EOSINOPHIL NFR BLD AUTO: 3.6 %
ERYTHROCYTE [DISTWIDTH] IN BLOOD BY AUTOMATED COUNT: 12.8 % (ref 10–15)
ERYTHROCYTE [SEDIMENTATION RATE] IN BLOOD BY WESTERGREN METHOD: 10 MM/H (ref 0–20)
GFR SERPL CREATININE-BSD FRML MDRD: >90 ML/MIN/{1.73_M2}
HCT VFR BLD AUTO: 32.9 % (ref 35–47)
HGB BLD-MCNC: 10.7 G/DL (ref 11.7–15.7)
IMM GRANULOCYTES # BLD: 0 10E9/L (ref 0–0.4)
IMM GRANULOCYTES NFR BLD: 0.1 %
LYMPHOCYTES # BLD AUTO: 1.9 10E9/L (ref 0.8–5.3)
LYMPHOCYTES NFR BLD AUTO: 26.6 %
MCH RBC QN AUTO: 28.6 PG (ref 26.5–33)
MCHC RBC AUTO-ENTMCNC: 32.5 G/DL (ref 31.5–36.5)
MCV RBC AUTO: 88 FL (ref 78–100)
MONOCYTES # BLD AUTO: 0.4 10E9/L (ref 0–1.3)
MONOCYTES NFR BLD AUTO: 5.8 %
NEUTROPHILS # BLD AUTO: 4.5 10E9/L (ref 1.6–8.3)
NEUTROPHILS NFR BLD AUTO: 63.8 %
NRBC # BLD AUTO: 0 10*3/UL
NRBC BLD AUTO-RTO: 0 /100
PLATELET # BLD AUTO: 226 10E9/L (ref 150–450)
RBC # BLD AUTO: 3.74 10E12/L (ref 3.8–5.2)
TSH SERPL DL<=0.005 MIU/L-ACNC: 0.53 MU/L (ref 0.4–4)
VANCOMYCIN SERPL-MCNC: 9.8 MG/L
WBC # BLD AUTO: 7 10E9/L (ref 4–11)

## 2019-03-21 PROCEDURE — 85025 COMPLETE CBC W/AUTO DIFF WBC: CPT | Performed by: INTERNAL MEDICINE

## 2019-03-21 PROCEDURE — 84443 ASSAY THYROID STIM HORMONE: CPT | Performed by: INTERNAL MEDICINE

## 2019-03-21 PROCEDURE — 80202 ASSAY OF VANCOMYCIN: CPT | Performed by: INTERNAL MEDICINE

## 2019-03-21 PROCEDURE — 85652 RBC SED RATE AUTOMATED: CPT | Performed by: INTERNAL MEDICINE

## 2019-03-21 PROCEDURE — 82565 ASSAY OF CREATININE: CPT | Performed by: INTERNAL MEDICINE

## 2019-03-21 PROCEDURE — 99214 OFFICE O/P EST MOD 30 MIN: CPT | Performed by: NURSE PRACTITIONER

## 2019-03-21 PROCEDURE — 84520 ASSAY OF UREA NITROGEN: CPT | Performed by: INTERNAL MEDICINE

## 2019-03-21 PROCEDURE — 86140 C-REACTIVE PROTEIN: CPT | Performed by: INTERNAL MEDICINE

## 2019-03-21 RX ORDER — GABAPENTIN 100 MG/1
100 CAPSULE ORAL 3 TIMES DAILY PRN
Qty: 90 CAPSULE | Refills: 1 | Status: SHIPPED | OUTPATIENT
Start: 2019-03-21 | End: 2019-06-11

## 2019-03-21 ASSESSMENT — PAIN SCALES - GENERAL: PAINLEVEL: SEVERE PAIN (7)

## 2019-03-21 NOTE — PROGRESS NOTES
"  SUBJECTIVE:   Samara Oropeza is a 23 year old female who presents to clinic today for the following health issues:      Vaginal Bleeding (Dysmenorrhea)  Onset: 7 weeks has had period    Description:   Duration of bleeding episodes: ongoing for 7 weeks; started in first week of February, after foot surgery when infection first started in left foot. Stared after she began clindamycin. Did not have any other medication changes at that time.  Frequency between periods:  Hasn't stopped  Describe bleeding/flow:   Clots: YES  Number of pads/hour: 1 every 3 hours, \"regular\" strength pads  Cramping: severe    Accompanying Signs & Symptoms:  Weakness: YES  Lightheadedness: YES  Hot flashes: YES  Nosebleeds/Easy bruising: YES  Vaginal Discharge: YES- has a yeast infection right now, started diflucan today    History:  Patient's last menstrual period was 03/09/2019.  Previous normal periods: YES  Contraceptive use: pill  Possibility of Pregnancy: no   Any bleeding after intercourse: no   Age of first period (menarche): 10-11  Abnormal PAP Smears: no     Precipitating factors:   none    Alleviating factors:  none    Therapies Tried and outcome: none    Anxiety Follow-Up    Status since last visit: Worsened     Other associated symptoms:None    Complicating factors:   Significant life event: Yes-  hospitalization   Current substance abuse: None  Depression symptoms: Yes-    TAHIR-7 SCORE 12/26/2018 1/23/2019 2/6/2019   Total Score - - -   Total Score - - -   Total Score 10 12 10       TAHIR-7      Problem list and histories reviewed & adjusted, as indicated.  Additional history: as documented    Patient Active Problem List   Diagnosis     Tics - Tourette syndrome     IBS (irritable bowel syndrome)     Allergic rhinitis     Generalized anxiety disorder     Knee pain     Concussion     Milk protein intolerance     Headaches due to old head injury     Behcet's disease (H)     Migraine     Spell of shaking     On colchicine " therapy     Palpitations     Fibromyalgia     Rheumatoid arthritis of multiple sites without rheumatoid factor (H)     Raynaud's disease without gangrene     Chronic abdominal pain     Patellofemoral instability     PTSD (post-traumatic stress disorder)     Cervical dystonia     Acute left ankle pain     Cervical pain     Major depressive disorder, recurrent episode, moderate (H)     Chronic pain syndrome     Convulsions, unspecified convulsion type (H)     Transient alteration of awareness     Vitamin D deficiency     Mobile cecum     Spells of decreased attentiveness     Pelvic floor weakness     Somatic symptom disorder     Anxiety state     Aphthous ulcer     Cough     Dysthymic disorder     Gastroparesis     GERD (gastroesophageal reflux disease)     Displacement of lumbar intervertebral disc without myelopathy     Intestinal malabsorption     Iron deficiency associated with nonfamilial restless legs syndrome     Other specified symptom associated with female genital organs     Enthesopathy of hip region     Tourette's syndrome     Cellulitis     Past Surgical History:   Procedure Laterality Date     ARTHROSCOPY ANKLE, REPAIR LIGAMENT Left 1/2/2019    Procedure: Ankle arthroscopy and sinus tarsi evacuation, ligament repair, left lower extremity;  Surgeon: Andres Johnson DPM;  Location:  OR     ARTHROSCOPY KNEE WITH PATELLAR REALIGNMENT  7/25/2013    Procedure: ARTHROSCOPY KNEE WITH PATELLAR REALIGNMENT;  Left Knee Arthroscopy, Medial Patellofemoral Ligament Reconstruction with Allograft  ;  Surgeon: Jennifer Acevedo MD;  Location: [facility]     COLONOSCOPY  2015     DENTAL SURGERY  1996    Teeth removal     ENDOSCOPY UPPER, COLONOSCOPY, COMBINED  2005     HC ESOPH/GAS REFLUX TEST W NASAL IMPED >1 HR N/A 2/15/2017    Procedure: ESOPHAGEAL IMPEDENCE FUNCTION TEST WITH 24 HOUR PH GREATER THAN 1 HOUR;  Surgeon: Timothy Matta MD;  Location: [facility] GI     IR PICC PLACEMENT > 5 YRS OF AGE  3/13/2019      IRRIGATION AND DEBRIDEMENT FOOT, COMBINED Left 3/12/2019    Procedure: COMBINED IRRIGATION AND DEBRIDEMENT LEFT ANKLE;  Surgeon: Micha Glover MD;  Location: [facility] OR       Social History     Tobacco Use     Smoking status: Never Smoker     Smokeless tobacco: Never Used   Substance Use Topics     Alcohol use: Yes     Alcohol/week: 0.6 oz     Types: 1 Standard drinks or equivalent per week     Comment: rarely     Family History   Problem Relation Age of Onset     Depression Mother      Neurologic Disorder Mother         Migraines, take imitrex injection.  Also in maternal grandmother.       Alcohol/Drug Father      Hypertension Father      Depression Father      Osteoarthritis Father      Cardiovascular Maternal Grandmother      Depression Maternal Grandmother      Hypertension Maternal Grandmother      Alzheimer Disease Maternal Grandmother      Cardiovascular Maternal Grandfather      Hypertension Maternal Grandfather      Depression Maternal Grandfather      Alcohol/Drug Maternal Grandfather      Cardiovascular Paternal Grandmother      Hypertension Paternal Grandmother      Cardiovascular Paternal Grandfather      Hypertension Paternal Grandfather      Glaucoma No family hx of      Macular Degeneration No family hx of            Reviewed and updated as needed this visit by clinical staff  Tobacco  Allergies  Meds  Med Hx  Surg Hx  Fam Hx  Soc Hx      Reviewed and updated as needed this visit by Provider         ROS:  CONSTITUTIONAL: NEGATIVE for fever, chills, change in weight  ENT/MOUTH: POSITIVE for nasal congestion  RESP: NEGATIVE for significant cough or SOB  BREAST: NEGATIVE for masses, tenderness or discharge  CV: NEGATIVE for chest pain, palpitations or peripheral edema  GI: POSITIVE for abdominal pain generalized, constipation, poor appetite and vomiting  : NEGATIVE for frequency, dysuria, or hematuria  MUSCULOSKELETAL:POSITIVE  for arthralgias , joint stiffness and joint swelling  In  left ankle and lower leg  ENDOCRINE: POSITIVE  for constipation    OBJECTIVE:     BP 99/65   Pulse 104   Temp 98  F (36.7  C) (Oral)   Resp 14   LMP 03/09/2019   SpO2 100%   There is no height or weight on file to calculate BMI.   GENERAL: healthy, alert and no distress  NECK: no adenopathy, no asymmetry, masses, or scars and thyroid normal to palpation  RESP: lungs clear to auscultation - no rales, rhonchi or wheezes  CV: regular rate and rhythm, normal S1 S2, no S3 or S4, no murmur, click or rub, no peripheral edema and peripheral pulses strong  MS: left lower extremity in boot, in wheel chair  SKIN: no suspicious lesions or rashes  NEURO: Normal strength and tone, mentation intact and speech normal  PSYCH: mentation appears normal, affect normal/bright    Diagnostic Test Results:  No results found. However, due to the size of the patient record, not all encounters were searched. Please check Results Review for a complete set of results.    ASSESSMENT/PLAN:     Problem List Items Addressed This Visit     Cellulitis    Behcet's disease (H)    Relevant Medications    benzocaine (TOPICALE XTRA) 20 % GEL      Other Visit Diagnoses     Dysmenorrhea    -  Primary    Relevant Orders    **TSH with free T4 reflex FUTURE 1yr    **Iron and iron binding capacity FUTURE 2mo    **CBC with platelets FUTURE 1yr    TAHIR (generalized anxiety disorder)        Relevant Medications    gabapentin (NEURONTIN) 100 MG capsule         Vaginal bleeding began after starting clindamycin; she has been on antibiotics on and off for the past seven weeks. Has been taking birth control daily. She is not currently sexually active. Advised okay to stop birth control for now, as it likely not effective with high dose antibiotics, and re-start when she finishes antibiotics within the next two months. Will check thyroid and iron status to rule out thyroid dysfunction contributing to dysmennorrhea. Arranged Pharm consult to consider drug  interactions and side effects.      EVI Cardona [facility]  Please note greater than 50% of this 30 minute appointment were spent in face to face counseling with the patient of the issues described above in the history of present illness and in the plan, including adding medications

## 2019-03-21 NOTE — PROGRESS NOTES
This is a recent snapshot of the patient's [facility] medical record.  For current drug dose and complete information and questions, call [phone] or In Basket pool, fv home infusion ([id number])  CSN Number:  [id number]

## 2019-03-21 NOTE — PROGRESS NOTES
Clinic Care Coordination Contact  [facility]/Voicemail    Referral Source: Self-patient/Caregiver  Clinical Data: Care Coordinator Outreach  Outreach attempted x 1.  Left message on voicemail with call back information and requested return call.  Plan: Care Coordinator will try to reach patient again in 3-5 business days.    ADRIANA Martinez  Clinic Care Coordinator   [facility]   [phone]

## 2019-03-22 ENCOUNTER — HOME INFUSION (PRE-WILLOW HOME INFUSION) (OUTPATIENT)
Dept: PHARMACY | Facility: CLINIC | Age: 24
End: 2019-03-22

## 2019-03-25 ENCOUNTER — TELEPHONE (OUTPATIENT)
Dept: PSYCHOLOGY | Facility: CLINIC | Age: 24
End: 2019-03-25

## 2019-03-26 ENCOUNTER — TELEPHONE (OUTPATIENT)
Dept: FAMILY MEDICINE | Facility: CLINIC | Age: 24
End: 2019-03-26

## 2019-03-26 ENCOUNTER — OFFICE VISIT (OUTPATIENT)
Dept: PSYCHOLOGY | Facility: CLINIC | Age: 24
End: 2019-03-26
Payer: COMMERCIAL

## 2019-03-26 ENCOUNTER — HOME INFUSION (PRE-WILLOW HOME INFUSION) (OUTPATIENT)
Dept: PHARMACY | Facility: CLINIC | Age: 24
End: 2019-03-26

## 2019-03-26 VITALS
RESPIRATION RATE: 18 BRPM | SYSTOLIC BLOOD PRESSURE: 126 MMHG | DIASTOLIC BLOOD PRESSURE: 86 MMHG | OXYGEN SATURATION: 100 % | HEART RATE: 74 BPM | TEMPERATURE: 98.2 F

## 2019-03-26 DIAGNOSIS — F41.1 GAD (GENERALIZED ANXIETY DISORDER): ICD-10-CM

## 2019-03-26 DIAGNOSIS — N94.6 DYSMENORRHEA: ICD-10-CM

## 2019-03-26 DIAGNOSIS — F33.1 MAJOR DEPRESSIVE DISORDER, RECURRENT EPISODE, MODERATE (H): Primary | ICD-10-CM

## 2019-03-26 DIAGNOSIS — K90.9 INTESTINAL MALABSORPTION, UNSPECIFIED TYPE: ICD-10-CM

## 2019-03-26 DIAGNOSIS — F43.10 PTSD (POST-TRAUMATIC STRESS DISORDER): Primary | ICD-10-CM

## 2019-03-26 DIAGNOSIS — L02.419 ANKLE ABSCESS: ICD-10-CM

## 2019-03-26 DIAGNOSIS — F95.2 TOURETTE'S SYNDROME: ICD-10-CM

## 2019-03-26 DIAGNOSIS — G89.4 CHRONIC PAIN SYNDROME: ICD-10-CM

## 2019-03-26 DIAGNOSIS — Q43.3 MOBILE CECUM (H): ICD-10-CM

## 2019-03-26 PROCEDURE — 90834 PSYTX W PT 45 MINUTES: CPT | Performed by: COUNSELOR

## 2019-03-26 ASSESSMENT — PAIN SCALES - GENERAL: PAINLEVEL: SEVERE PAIN (7)

## 2019-03-26 NOTE — TELEPHONE ENCOUNTER
Patient was referred to the Integrated Primary care clinic, chart was reviewed, Medically complex, MH unstable being followed for counseling with [facility], no psych involvement currently, chronic pain saw pain PT, no addiction, patient does qualify for our program, please schedule with a PCP and a [facility]     Carmen Jefferson FNP  MEDICAL LEAD

## 2019-03-26 NOTE — NURSING NOTE
Chief Complaint   Patient presents with     Blood Draw     No labs drawn at this visit. Pt PICC line had no blood return when flushed with saline. Pt complained of chest pain and site tenderness during flush. Up on assesment of insertion site of the PICC no redness or drainage was observed. But Pt had complained of tenderness in her arm, and pain in her chest when line flushed. She was reffered to contact 24 hr nurse line or go to the emergency room before her next dose of Vanco.      Moy Cerna RN
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
## 2019-03-26 NOTE — Clinical Note
Samara Meza is inquiring about the integrated primary care clinic. Please discuss with her at next visit.Thank you,Ernestina Patel MA, [facility]

## 2019-03-26 NOTE — PROGRESS NOTES
This is a recent snapshot of the patient's [facility] medical record.  For current drug dose and complete information and questions, call [phone] or In Basket pool, fv home infusion ([id number])  CSN Number:  [id number]

## 2019-03-27 ENCOUNTER — TELEPHONE (OUTPATIENT)
Dept: INFECTIOUS DISEASES | Facility: CLINIC | Age: 24
End: 2019-03-27

## 2019-03-27 ENCOUNTER — HOME INFUSION (PRE-WILLOW HOME INFUSION) (OUTPATIENT)
Dept: PHARMACY | Facility: CLINIC | Age: 24
End: 2019-03-27

## 2019-03-27 ENCOUNTER — HOSPITAL ENCOUNTER (EMERGENCY)
Facility: CLINIC | Age: 24
Discharge: HOME | End: 2019-03-28
Attending: EMERGENCY MEDICINE | Admitting: EMERGENCY MEDICINE
Payer: COMMERCIAL

## 2019-03-27 ENCOUNTER — APPOINTMENT (OUTPATIENT)
Dept: CT IMAGING | Facility: CLINIC | Age: 24
End: 2019-03-27
Attending: EMERGENCY MEDICINE
Payer: COMMERCIAL

## 2019-03-27 DIAGNOSIS — N17.9 ACUTE KIDNEY INJURY (H): ICD-10-CM

## 2019-03-27 DIAGNOSIS — K59.09 CHRONIC CONSTIPATION: ICD-10-CM

## 2019-03-27 DIAGNOSIS — R10.84 ABDOMINAL PAIN, GENERALIZED: ICD-10-CM

## 2019-03-27 LAB
ALBUMIN SERPL-MCNC: 2.8 G/DL (ref 3.4–5)
ALBUMIN UR-MCNC: 10 MG/DL
ALP SERPL-CCNC: 93 U/L (ref 40–150)
ALT SERPL W P-5'-P-CCNC: 24 U/L (ref 0–50)
ANION GAP SERPL CALCULATED.3IONS-SCNC: 8 MMOL/L (ref 3–14)
APPEARANCE UR: ABNORMAL
AST SERPL W P-5'-P-CCNC: 39 U/L (ref 0–45)
BACTERIA #/AREA URNS HPF: ABNORMAL /HPF
BASOPHILS # BLD AUTO: 0 10E9/L (ref 0–0.2)
BASOPHILS # BLD AUTO: 0.1 10E9/L (ref 0–0.2)
BASOPHILS NFR BLD AUTO: 0.3 %
BASOPHILS NFR BLD AUTO: 1.1 %
BILIRUB SERPL-MCNC: 0.4 MG/DL (ref 0.2–1.3)
BILIRUB UR QL STRIP: NEGATIVE
BUN SERPL-MCNC: 19 MG/DL (ref 7–30)
CALCIUM SERPL-MCNC: 8.5 MG/DL (ref 8.5–10.1)
CHLORIDE SERPL-SCNC: 107 MMOL/L (ref 94–109)
CO2 SERPL-SCNC: 21 MMOL/L (ref 20–32)
COLOR UR AUTO: YELLOW
CREAT SERPL-MCNC: 1.98 MG/DL (ref 0.52–1.04)
CREAT SERPL-MCNC: 2.13 MG/DL (ref 0.52–1.04)
CRP SERPL-MCNC: 139 MG/L (ref 0–8)
CRP SERPL-MCNC: 140 MG/L (ref 0–8)
DIFFERENTIAL METHOD BLD: ABNORMAL
DIFFERENTIAL METHOD BLD: ABNORMAL
EOSINOPHIL # BLD AUTO: 0.4 10E9/L (ref 0–0.7)
EOSINOPHIL # BLD AUTO: 0.4 10E9/L (ref 0–0.7)
EOSINOPHIL NFR BLD AUTO: 5.8 %
EOSINOPHIL NFR BLD AUTO: 7.8 %
ERYTHROCYTE [DISTWIDTH] IN BLOOD BY AUTOMATED COUNT: 12.8 % (ref 10–15)
ERYTHROCYTE [DISTWIDTH] IN BLOOD BY AUTOMATED COUNT: 12.9 % (ref 10–15)
ERYTHROCYTE [SEDIMENTATION RATE] IN BLOOD BY WESTERGREN METHOD: 18 MM/H (ref 0–20)
FERRITIN SERPL-MCNC: 132 NG/ML (ref 12–150)
GFR SERPL CREATININE-BSD FRML MDRD: 31 ML/MIN/{1.73_M2}
GFR SERPL CREATININE-BSD FRML MDRD: 34 ML/MIN/{1.73_M2}
GLUCOSE SERPL-MCNC: 153 MG/DL (ref 70–99)
GLUCOSE UR STRIP-MCNC: NEGATIVE MG/DL
HCG UR QL: NEGATIVE
HCT VFR BLD AUTO: 34.1 % (ref 35–47)
HCT VFR BLD AUTO: 35.6 % (ref 35–47)
HGB BLD-MCNC: 11.2 G/DL (ref 11.7–15.7)
HGB BLD-MCNC: 11.3 G/DL (ref 11.7–15.7)
HGB UR QL STRIP: NEGATIVE
IMM GRANULOCYTES # BLD: 0 10E9/L (ref 0–0.4)
IMM GRANULOCYTES # BLD: 0 10E9/L (ref 0–0.4)
IMM GRANULOCYTES NFR BLD: 0.2 %
IMM GRANULOCYTES NFR BLD: 0.2 %
KETONES UR STRIP-MCNC: NEGATIVE MG/DL
LACTATE BLD-SCNC: 1.3 MMOL/L (ref 0.7–2)
LEUKOCYTE ESTERASE UR QL STRIP: NEGATIVE
LIPASE SERPL-CCNC: 61 U/L (ref 73–393)
LYMPHOCYTES # BLD AUTO: 0.9 10E9/L (ref 0.8–5.3)
LYMPHOCYTES # BLD AUTO: 1 10E9/L (ref 0.8–5.3)
LYMPHOCYTES NFR BLD AUTO: 16.5 %
LYMPHOCYTES NFR BLD AUTO: 18.9 %
MCH RBC QN AUTO: 28.9 PG (ref 26.5–33)
MCH RBC QN AUTO: 28.9 PG (ref 26.5–33)
MCHC RBC AUTO-ENTMCNC: 31.7 G/DL (ref 31.5–36.5)
MCHC RBC AUTO-ENTMCNC: 32.8 G/DL (ref 31.5–36.5)
MCV RBC AUTO: 88 FL (ref 78–100)
MCV RBC AUTO: 91 FL (ref 78–100)
MONOCYTES # BLD AUTO: 0.5 10E9/L (ref 0–1.3)
MONOCYTES # BLD AUTO: 0.5 10E9/L (ref 0–1.3)
MONOCYTES NFR BLD AUTO: 11.4 %
MONOCYTES NFR BLD AUTO: 8 %
MUCOUS THREADS #/AREA URNS LPF: PRESENT /LPF
NEUTROPHILS # BLD AUTO: 2.9 10E9/L (ref 1.6–8.3)
NEUTROPHILS # BLD AUTO: 4.3 10E9/L (ref 1.6–8.3)
NEUTROPHILS NFR BLD AUTO: 60.6 %
NEUTROPHILS NFR BLD AUTO: 69.2 %
NITRATE UR QL: NEGATIVE
NRBC # BLD AUTO: 0 10*3/UL
NRBC # BLD AUTO: 0 10*3/UL
NRBC BLD AUTO-RTO: 0 /100
NRBC BLD AUTO-RTO: 0 /100
PH UR STRIP: 5.5 PH (ref 5–7)
PLATELET # BLD AUTO: 255 10E9/L (ref 150–450)
PLATELET # BLD AUTO: 273 10E9/L (ref 150–450)
POTASSIUM SERPL-SCNC: 5.2 MMOL/L (ref 3.4–5.3)
PROT SERPL-MCNC: 6.5 G/DL (ref 6.8–8.8)
RBC # BLD AUTO: 3.87 10E12/L (ref 3.8–5.2)
RBC # BLD AUTO: 3.91 10E12/L (ref 3.8–5.2)
RBC #/AREA URNS AUTO: 3 /HPF (ref 0–2)
SODIUM SERPL-SCNC: 136 MMOL/L (ref 133–144)
SOURCE: ABNORMAL
SP GR UR STRIP: 1.01 (ref 1–1.03)
SQUAMOUS #/AREA URNS AUTO: <1 /HPF (ref 0–1)
TRANS CELLS #/AREA URNS HPF: <1 /HPF (ref 0–1)
UROBILINOGEN UR STRIP-MCNC: NORMAL MG/DL (ref 0–2)
VANCOMYCIN SERPL-MCNC: 31.9 MG/L
WBC # BLD AUTO: 4.7 10E9/L (ref 4–11)
WBC # BLD AUTO: 6.3 10E9/L (ref 4–11)
WBC #/AREA URNS AUTO: 2 /HPF (ref 0–5)

## 2019-03-27 PROCEDURE — 81025 URINE PREGNANCY TEST: CPT | Performed by: EMERGENCY MEDICINE

## 2019-03-27 PROCEDURE — 85025 COMPLETE CBC W/AUTO DIFF WBC: CPT | Performed by: EMERGENCY MEDICINE

## 2019-03-27 PROCEDURE — 83605 ASSAY OF LACTIC ACID: CPT | Performed by: EMERGENCY MEDICINE

## 2019-03-27 PROCEDURE — 74176 CT ABD & PELVIS W/O CONTRAST: CPT

## 2019-03-27 PROCEDURE — 99285 EMERGENCY DEPT VISIT HI MDM: CPT | Mod: Z6 | Performed by: EMERGENCY MEDICINE

## 2019-03-27 PROCEDURE — 25000128 H RX IP 250 OP 636: Performed by: EMERGENCY MEDICINE

## 2019-03-27 PROCEDURE — 96376 TX/PRO/DX INJ SAME DRUG ADON: CPT | Performed by: EMERGENCY MEDICINE

## 2019-03-27 PROCEDURE — 86140 C-REACTIVE PROTEIN: CPT | Performed by: INTERNAL MEDICINE

## 2019-03-27 PROCEDURE — 85652 RBC SED RATE AUTOMATED: CPT | Performed by: INTERNAL MEDICINE

## 2019-03-27 PROCEDURE — 82565 ASSAY OF CREATININE: CPT | Performed by: INTERNAL MEDICINE

## 2019-03-27 PROCEDURE — 86140 C-REACTIVE PROTEIN: CPT | Performed by: EMERGENCY MEDICINE

## 2019-03-27 PROCEDURE — 96375 TX/PRO/DX INJ NEW DRUG ADDON: CPT | Performed by: EMERGENCY MEDICINE

## 2019-03-27 PROCEDURE — 85025 COMPLETE CBC W/AUTO DIFF WBC: CPT | Performed by: INTERNAL MEDICINE

## 2019-03-27 PROCEDURE — 80053 COMPREHEN METABOLIC PANEL: CPT | Performed by: EMERGENCY MEDICINE

## 2019-03-27 PROCEDURE — 80202 ASSAY OF VANCOMYCIN: CPT | Performed by: INTERNAL MEDICINE

## 2019-03-27 PROCEDURE — 25000131 ZZH RX MED GY IP 250 OP 636 PS 637: Performed by: EMERGENCY MEDICINE

## 2019-03-27 PROCEDURE — 96374 THER/PROPH/DIAG INJ IV PUSH: CPT | Performed by: EMERGENCY MEDICINE

## 2019-03-27 PROCEDURE — 83690 ASSAY OF LIPASE: CPT | Performed by: EMERGENCY MEDICINE

## 2019-03-27 PROCEDURE — 99285 EMERGENCY DEPT VISIT HI MDM: CPT | Mod: 25 | Performed by: EMERGENCY MEDICINE

## 2019-03-27 PROCEDURE — 82728 ASSAY OF FERRITIN: CPT | Performed by: INTERNAL MEDICINE

## 2019-03-27 PROCEDURE — 81001 URINALYSIS AUTO W/SCOPE: CPT | Performed by: EMERGENCY MEDICINE

## 2019-03-27 PROCEDURE — 25000132 ZZH RX MED GY IP 250 OP 250 PS 637: Performed by: EMERGENCY MEDICINE

## 2019-03-27 RX ORDER — SODIUM CHLORIDE 9 MG/ML
INJECTION, SOLUTION INTRAVENOUS CONTINUOUS
Status: DISCONTINUED | OUTPATIENT
Start: 2019-03-27 | End: 2019-03-28 | Stop reason: HOSPADM

## 2019-03-27 RX ORDER — ONDANSETRON 2 MG/ML
4 INJECTION INTRAMUSCULAR; INTRAVENOUS EVERY 30 MIN PRN
Status: DISCONTINUED | OUTPATIENT
Start: 2019-03-27 | End: 2019-03-28 | Stop reason: HOSPADM

## 2019-03-27 RX ORDER — MORPHINE SULFATE 2 MG/ML
2 INJECTION, SOLUTION INTRAMUSCULAR; INTRAVENOUS ONCE
Status: COMPLETED | OUTPATIENT
Start: 2019-03-27 | End: 2019-03-27

## 2019-03-27 RX ORDER — MORPHINE SULFATE 4 MG/ML
4 INJECTION, SOLUTION INTRAMUSCULAR; INTRAVENOUS
Status: COMPLETED | OUTPATIENT
Start: 2019-03-27 | End: 2019-03-27

## 2019-03-27 RX ORDER — ONDANSETRON 4 MG/1
4 TABLET, ORALLY DISINTEGRATING ORAL ONCE
Status: COMPLETED | OUTPATIENT
Start: 2019-03-27 | End: 2019-03-27

## 2019-03-27 RX ORDER — ACETAMINOPHEN 500 MG
1000 TABLET ORAL ONCE
Status: COMPLETED | OUTPATIENT
Start: 2019-03-27 | End: 2019-03-27

## 2019-03-27 RX ADMIN — MORPHINE SULFATE 2 MG: 2 INJECTION, SOLUTION INTRAMUSCULAR; INTRAVENOUS at 23:18

## 2019-03-27 RX ADMIN — ONDANSETRON 4 MG: 4 TABLET, ORALLY DISINTEGRATING ORAL at 19:23

## 2019-03-27 RX ADMIN — ACETAMINOPHEN 1000 MG: 500 TABLET, FILM COATED ORAL at 19:23

## 2019-03-27 RX ADMIN — SODIUM CHLORIDE 1000 ML: 9 INJECTION, SOLUTION INTRAVENOUS at 21:43

## 2019-03-27 RX ADMIN — MORPHINE SULFATE 2 MG: 4 INJECTION INTRAVENOUS at 21:46

## 2019-03-27 RX ADMIN — ONDANSETRON 4 MG: 2 INJECTION INTRAMUSCULAR; INTRAVENOUS at 21:44

## 2019-03-27 ASSESSMENT — ENCOUNTER SYMPTOMS
SHORTNESS OF BREATH: 1
ABDOMINAL PAIN: 1
APPETITE CHANGE: 1
FLANK PAIN: 1
NAUSEA: 1
VOMITING: 1
COUGH: 1
PHOTOPHOBIA: 1

## 2019-03-27 ASSESSMENT — MIFFLIN-ST. JEOR: SCORE: 1399.53

## 2019-03-27 NOTE — PROGRESS NOTES
This is a recent snapshot of the patient's [facility] medical record.  For current drug dose and complete information and questions, call [phone] or In [facility] pool, fv home infusion ([id number])  CSN Number:  [id number]

## 2019-03-27 NOTE — TELEPHONE ENCOUNTER
Writer reached out to pt. Pt was unaware of referral. Pt wants to discuss the referral with Sonja Abreu at her appt tomorrow. Pt stated that after her appt if she is interested she will call [facility] back to schedule. Writer will reach out to pt after appt sandie/ Brady to see if pt is interested in [facility] clinic.    Shara Webb  [facility]

## 2019-03-28 ENCOUNTER — HOME INFUSION (PRE-WILLOW HOME INFUSION) (OUTPATIENT)
Dept: PHARMACY | Facility: CLINIC | Age: 24
End: 2019-03-28

## 2019-03-28 VITALS
TEMPERATURE: 98.1 F | RESPIRATION RATE: 16 BRPM | DIASTOLIC BLOOD PRESSURE: 100 MMHG | WEIGHT: 150 LBS | BODY MASS INDEX: 26.58 KG/M2 | HEART RATE: 102 BPM | SYSTOLIC BLOOD PRESSURE: 136 MMHG | HEIGHT: 63 IN | OXYGEN SATURATION: 100 %

## 2019-03-28 LAB
BASOPHILS # BLD AUTO: 0.1 10E9/L (ref 0–0.2)
BASOPHILS NFR BLD AUTO: 1 %
BUN SERPL-MCNC: 18 MG/DL (ref 7–30)
CREAT SERPL-MCNC: 2 MG/DL (ref 0.52–1.04)
CRP SERPL-MCNC: 105 MG/L (ref 0–8)
DIFFERENTIAL METHOD BLD: NORMAL
EOSINOPHIL # BLD AUTO: 0.4 10E9/L (ref 0–0.7)
EOSINOPHIL NFR BLD AUTO: 7.6 %
ERYTHROCYTE [DISTWIDTH] IN BLOOD BY AUTOMATED COUNT: 12.8 % (ref 10–15)
ERYTHROCYTE [SEDIMENTATION RATE] IN BLOOD BY WESTERGREN METHOD: 19 MM/H (ref 0–20)
GFR SERPL CREATININE-BSD FRML MDRD: 34 ML/MIN/{1.73_M2}
HCT VFR BLD AUTO: 36.3 % (ref 35–47)
HGB BLD-MCNC: 12 G/DL (ref 11.7–15.7)
IMM GRANULOCYTES # BLD: 0 10E9/L (ref 0–0.4)
IMM GRANULOCYTES NFR BLD: 0.4 %
IRON SERPL-MCNC: NORMAL UG/DL (ref 35–180)
LYMPHOCYTES # BLD AUTO: 1.3 10E9/L (ref 0.8–5.3)
LYMPHOCYTES NFR BLD AUTO: 25.6 %
MCH RBC QN AUTO: 29.1 PG (ref 26.5–33)
MCHC RBC AUTO-ENTMCNC: 33.1 G/DL (ref 31.5–36.5)
MCV RBC AUTO: 88 FL (ref 78–100)
MONOCYTES # BLD AUTO: 0.7 10E9/L (ref 0–1.3)
MONOCYTES NFR BLD AUTO: 14.2 %
NEUTROPHILS # BLD AUTO: 2.6 10E9/L (ref 1.6–8.3)
NEUTROPHILS NFR BLD AUTO: 51.2 %
NRBC # BLD AUTO: 0 10*3/UL
NRBC BLD AUTO-RTO: 0 /100
PLATELET # BLD AUTO: 319 10E9/L (ref 150–450)
RBC # BLD AUTO: 4.12 10E12/L (ref 3.8–5.2)
VANCOMYCIN SERPL-MCNC: 24.7 MG/L
WBC # BLD AUTO: 5.2 10E9/L (ref 4–11)

## 2019-03-28 PROCEDURE — 85652 RBC SED RATE AUTOMATED: CPT | Performed by: INTERNAL MEDICINE

## 2019-03-28 PROCEDURE — 83540 ASSAY OF IRON: CPT | Performed by: INTERNAL MEDICINE

## 2019-03-28 PROCEDURE — 86140 C-REACTIVE PROTEIN: CPT | Performed by: INTERNAL MEDICINE

## 2019-03-28 PROCEDURE — 85025 COMPLETE CBC W/AUTO DIFF WBC: CPT | Performed by: INTERNAL MEDICINE

## 2019-03-28 PROCEDURE — 80202 ASSAY OF VANCOMYCIN: CPT | Performed by: INTERNAL MEDICINE

## 2019-03-28 PROCEDURE — 84520 ASSAY OF UREA NITROGEN: CPT | Performed by: INTERNAL MEDICINE

## 2019-03-28 PROCEDURE — 82565 ASSAY OF CREATININE: CPT | Performed by: INTERNAL MEDICINE

## 2019-03-28 NOTE — PROGRESS NOTES
S: Patient was admitted to our service for n/v and abdominal pain. CT showed sigmoid stricture and concern for bowel obstruction. Labs significant for JOSE JUAN. Patient was requesting to leave from ED, I went down and discussed with the patient my recommendation that she come into the hospital for treatment. She understood my concerns and was able to articulate why we were recommending admission, as well as the risks of leaving the hospital tonight and not receiving treatment. She accepted these risks and signed AMA paperwork. She has a home health nurse coming to her house tomorrow to recheck labs and evaluate. I discussed with patient signs that things are worsening and urged her to return to the ED if her symptoms change or worsen.     Isidra Melvin MD

## 2019-03-28 NOTE — ED TRIAGE NOTES
Pt. Presents to ED with concerns for elevated kidney function tests and CRP. Pt. Reports not eating for several days, having abdominal pain, back pain, L ankle pain where there is a known infection that patient is receiving IV Vanco through a PICC line for. AVSS on RA. Pt. Reports feeling confused and foggy, oriented x 3 and able to provide history along with the help from her mother.

## 2019-03-28 NOTE — TELEPHONE ENCOUNTER
I was called by pharmacy given patient has an elevated Cr at 1.9 and CRP at 139. Elevated vanc level as well. Pharmacy reported patient had a fever, was having trouble breathing, and overall feeling poorly. She thought the patient was not keeping anything down.    I called the patient who reports she went to the ED at [facility] last night with her picc line and they were able to get it to infuse. She had an elevated Cr at 2.1 which was noted although she was unaware. She was to call the clinic today. She got her vanc infusion today but then had the elevated Cr in her labs. She took 2 tabs of ibuprofen yesterday. She reports new headaches (She gets shots for migraines but these are different). She also reports neck pain, stiffness, and photophobia.      I advised her to be seen at the ED. She was hesitant and had her mom call me.  The mom is a nurse. When I reported Ms. Oropeza's renal failure and elevated CRP she said she would go to the [place] ED.     I think she needs further evaluation for renal failure, evaluation of her the cause of her elevated CRP including the ankle abscess or other abscess foci and consider an LP given headaches, photophobia, and neck stiffness.    Mariya Gupta MD  Infectious Disease

## 2019-03-28 NOTE — ED NOTES
Patient requests a private room as an inpatient. RN notified patient placement, patient placement stated that there were no privates available at this time in the hospital. Patient was notified that there are no private rooms available, so she may be boarding in the emergency department overnight until one becomes available. Patient and mother discussed this and came to the conclusion that she would like to leave against medical advice. Radha's resident paged and will come speak with the patient.

## 2019-03-28 NOTE — PROGRESS NOTES
This is a recent snapshot of the patient's [facility] medical record.  For current drug dose and complete information and questions, call [phone] or In [facility] pool, fv home infusion ([id number])  CSN Number:  [id number]

## 2019-03-28 NOTE — ED NOTES
Dr. Melvin in to speak with patient. Patient still requesting to leave against medical advice; she is able to understand the risks involved and is capable of making decisions independently. Patient signed AMA paperwork.

## 2019-03-28 NOTE — ED NOTES
[facility], [place]   ED Nurse to Floor Handoff     Samara Oropeza is a 23 year old female who speaks English and lives with others,  in a home  They arrived in the ED by car from home    ED Chief Complaint: Wound Infection; Abdominal Pain; and Abnormal Labs    ED Dx;   Final diagnoses:   Abdominal pain, generalized   Acute kidney injury (H)   Chronic constipation         Needed?: No    Allergies:   Allergies   Allergen Reactions     Amoxil [Penicillins] Rash     Dad unsure of reaction.     Bee Venom Anaphylaxis     Contrast Dye Rash     Contrast Media Ready-[facility] [place], 04/09/2014.; Cont[facility] [place], 04/09/2014.  NOTE: this is a contrast media oral with iodine. Premedicate with methylpred standard for IV contrast, request barium contrast for oral contrast.     Orange Fruit [Citrus] Anaphylaxis     Pineapple Anaphylaxis, Difficulty breathing and Rash     Reglan [Metoclopramide] Other (See Comments)     IV dose only, in ER, rapid heart rate.     Ace Inhibitors      Difficulty in breathing and GI upset     Amitiza [Lubiprostone] Nausea and Vomiting     Amoxicillin-Pot Clavulanate      Midazolam Unknown     Other reaction(s): Unknown  parent states that when pt takes this medication, she wakes up being very violent .  parent states that when pt takes this medication, she wakes up being very violent .     No Clinical Screening - See Comments      Bleech/ chest tightness, itchy throat, swollen tongue, hives     Tizanidine Other (See Comments)     Confusion, back pain, photophobia, abdominal pain, shaking, anxious       Versed      Coming out of pelvic exam at age of 5, was kicking and screaming when coming out of the versed.     Adhesive Tape Rash     Azithromycin Hives and Rash     Cephalexin Itching and Rash     Itchy mouth  Itchy mouth     Keflex [Cephalexin-Fd&C Yellow #6] Hives   .  Past Medical Hx:   Past Medical History:   Diagnosis Date     Anemia       Anxiety      Arthritis      Behcet's disease (H)      Cervical adenitis May 2010     Chronic abdominal pain      Constipation, chronic 1994     Fibromyalgia      Gastro-oesophageal reflux disease      Gastroparesis      Irregular heart beat     tachycardia, has had workup     Migraines      Neuromuscular disorder (H)     fibramyalgia     Palpitations      Seizure (H)      Seizures (H)     unknown etiology     Syncope      Tourette's       Baseline Mental status: WDL  Current Mental Status changes: at basesline    Infection present or suspected this encounter: no  Sepsis suspected: No  Isolation type: No active isolations     Activity level - Baseline/Home:  Stand with Assist  Activity Level - Current:   Stand with Assist    Bariatric equipment needed?: No    In the ED these meds were given:   Medications   0.9% sodium chloride BOLUS (1,000 mLs Intravenous New Bag 3/27/19 2143)     Followed by   0.9% sodium chloride BOLUS (not administered)     Followed by   sodium chloride 0.9% infusion (not administered)   ondansetron (ZOFRAN) injection 4 mg (4 mg Intravenous Given 3/27/19 2144)   acetaminophen (TYLENOL) tablet 1,000 mg (1,000 mg Oral Given 3/27/19 1923)   ondansetron (ZOFRAN-ODT) ODT tab 4 mg (4 mg Oral Given 3/27/19 1923)   morphine (PF) injection 4 mg (2 mg Intravenous Given 3/27/19 2146)   morphine (PF) injection 2 mg (2 mg Intravenous Given 3/27/19 2318)       Drips running?  No    Home pump  No    Current LDAs  PICC Single Lumen 03/13/19 Right Brachial vein medial (Active)   Number of days: 14       Wound 03/09/19 Distal Foot Other (comment) Purulent drainage from surgical incisional site.  (Active)   Number of days: 18       Incision/Surgical Site 01/02/19 Left Ankle (Active)   Number of days: 84       Incision/Surgical Site 03/12/19 Left;Lateral Ankle (Active)   Number of days: 15       Labs results:   Labs Ordered and Resulted from Time of ED Arrival Up to the Time of Departure from the ED   CBC WITH  PLATELETS DIFFERENTIAL - Abnormal; Notable for the following components:       Result Value    Hemoglobin 11.3 (*)     All other components within normal limits   COMPREHENSIVE METABOLIC PANEL - Abnormal; Notable for the following components:    Glucose 153 (*)     Creatinine 2.13 (*)     GFR Estimate 31 (*)     GFR Estimate If Black 36 (*)     Albumin 2.8 (*)     Protein Total 6.5 (*)     All other components within normal limits   LIPASE - Abnormal; Notable for the following components:    Lipase 61 (*)     All other components within normal limits   CRP INFLAMMATION - Abnormal; Notable for the following components:    CRP Inflammation 140.0 (*)     All other components within normal limits   UA MACROSCOPIC WITH REFLEX TO MICRO AND CULTURE - Abnormal; Notable for the following components:    Protein Albumin Urine 10 (*)     RBC Urine 3 (*)     Bacteria Urine Few (*)     Mucous Urine Present (*)     All other components within normal limits   LACTIC ACID WHOLE BLOOD   HCG QUALITATIVE URINE       Imaging Studies:   Recent Results (from the past 24 hour(s))   CT Abdomen Pelvis w/o Contrast    Narrative    Examination:  CT ABDOMEN PELVIS W/O CONTRAST 3/27/2019 9:44 PM     History: Abdominal pain, unspecified    Comparison: 1/8/2018    Technique: CT of the abdomen and pelvis were obtained without  contrast. Sagittal and coronal reconstructions created and reviewed.    Findings:   Liver, gallbladder, pancreas, and spleen are normal.     Kidneys and adrenal glands are normal. No hydronephrosis or  hydroureter.     Severe colonic stool burden with mildly dilated colon and decompressed  distal sigmoid. There is distention of the distal ileum, which is also  distended with fecal material. Additional mildly dilated loops of  distal ileum noted in the pelvis. No evidence of perforation.    Lower thorax: Unremarkable.    Bones and soft tissues: No acute or suspicious osseous abnormality.      Impression    Impression:   There  "is a large colonic stool burden. This causes distention of the  majority of the colon, with the exception of the distal sigmoid and  rectum, as well as dilatation of the distal ileum which contains  fecalized material. Findings concerning for sigmoid stricture and low  grade obstruction.      I have personally reviewed the examination and initial interpretation  and I agree with the findings.    RYLEE APODACA MD       Recent vital signs:   /89   Pulse 77   Temp 98.1  F (36.7  C) (Oral)   Resp 16   Ht 1.6 m (5' 3\")   Wt 68 kg (150 lb)   LMP 03/09/2019   SpO2 99%   Breastfeeding? No   BMI 26.57 kg/m      Cardiac Rhythm: Normal Sinus  Pt needs tele? No  Skin/wound Issues: Wound on left foot    Code Status: Full Code    Pain control: fair    Nausea control: good    Abnormal labs/tests/findings requiring intervention:      Family present during ED course? Yes   Family Comments/Social Situation comments: At bedside    Tasks needing completion: None    Dunia Shelton RN    [phone] [facility]      "

## 2019-03-28 NOTE — ED PROVIDER NOTES
"    [facility] EMERGENCY DEPARTMENT ([facility])  3/27/19    History     Chief Complaint   Patient presents with     Wound Infection     Abdominal Pain     Abnormal Labs     The history is provided by the patient, a parent and medical records.     Samara Oropeza is a 23 year old female with a past medical history significant for anxiety, Tourette's syndrome, migraine, irritable bowel syndrome, trochanteric bursitis, tinnitus, aphthous ulcer, herniated lumbar disc, TAHIR, dysthymic disorder and PTSD who presents to the Emergency Department today due to elevated kidney function tests and CRP as well as epigastric abdominal pain radiating around her left flank. Patients CRP yesterday was 139.0 and Cr was 1.9. Patient reports that her abdominal pain and flank pain have been occurring for the past 5-6 days> Patient also reports new photophobia. She has not ate in 4 days and has been vomiting any liquids. Patient is not currently on any medications. She does report that she is currently being treated for a staph infection on the bone of her left foot from an ankle surgery. Patient is reporting an ongoing cough and shortness of breath.    Per chart review patient was seen yesterday at [facility] ED due to evaluation of a vascular access problem. She was reporting PICC line issues in her right ar. They were unable to draw back earlier at the clinic that day and that it was \"falling out\". Her PICC line is for antibiotics due to cellulitis and abscess of left ankle (3/12/2019).    I have reviewed the Medications, Allergies, Past Medical and Surgical History, and Social History in the [facility] system.    Past Medical History:   Diagnosis Date     Anemia      Anxiety      Arthritis      Behcet's disease (H)      Cervical adenitis May 2010     Chronic abdominal pain      Constipation, chronic 1994     Fibromyalgia      Gastro-oesophageal reflux disease      Gastroparesis      Irregular heart beat     tachycardia, has had " workup     Migraines      Neuromuscular disorder (H)     fibramyalgia     Palpitations      Seizure (H)      Seizures (H)     unknown etiology     Syncope      Tourette's        Past Surgical History:   Procedure Laterality Date     ARTHROSCOPY ANKLE, REPAIR LIGAMENT Left 1/2/2019    Procedure: Ankle arthroscopy and sinus tarsi evacuation, ligament repair, left lower extremity;  Surgeon: Andres Johnson DPM;  Location:  OR     ARTHROSCOPY KNEE WITH PATELLAR REALIGNMENT  7/25/2013    Procedure: ARTHROSCOPY KNEE WITH PATELLAR REALIGNMENT;  Left Knee Arthroscopy, Medial Patellofemoral Ligament Reconstruction with Allograft  ;  Surgeon: Jennifer Acevedo MD;  Location: [facility] OR     COLONOSCOPY  2015     DENTAL SURGERY  1996    Teeth removal     ENDOSCOPY UPPER, COLONOSCOPY, COMBINED  2005     HC ESOPH/GAS REFLUX TEST W NASAL IMPED >1 HR N/A 2/15/2017    Procedure: ESOPHAGEAL IMPEDENCE FUNCTION TEST WITH 24 HOUR PH GREATER THAN 1 HOUR;  Surgeon: Timothy Matta MD;  Location: [facility] GI     IR PICC PLACEMENT > 5 YRS OF AGE  3/13/2019     IRRIGATION AND DEBRIDEMENT FOOT, COMBINED Left 3/12/2019    Procedure: COMBINED IRRIGATION AND DEBRIDEMENT LEFT ANKLE;  Surgeon: Micha Glover MD;  Location: [facility] OR       Family History   Problem Relation Age of Onset     Depression Mother      Neurologic Disorder Mother         Migraines, take imitrex injection.  Also in maternal grandmother.       Alcohol/Drug Father      Hypertension Father      Depression Father      Osteoarthritis Father      Cardiovascular Maternal Grandmother      Depression Maternal Grandmother      Hypertension Maternal Grandmother      Alzheimer Disease Maternal Grandmother      Cardiovascular Maternal Grandfather      Hypertension Maternal Grandfather      Depression Maternal Grandfather      Alcohol/Drug Maternal Grandfather      Cardiovascular Paternal Grandmother      Hypertension Paternal Grandmother      Cardiovascular Paternal  Grandfather      Hypertension Paternal Grandfather      Glaucoma No family hx of      Macular Degeneration No family hx of        Social History     Tobacco Use     Smoking status: Never Smoker     Smokeless tobacco: Never Used   Substance Use Topics     Alcohol use: Yes     Alcohol/week: 0.6 oz     Types: 1 Standard drinks or equivalent per week     Comment: rarely       Current Facility-Administered Medications   Medication     0.9% sodium chloride BOLUS    Followed by     sodium chloride 0.9% infusion     ondansetron (ZOFRAN) injection 4 mg     Current Outpatient Medications   Medication     albuterol (PROAIR HFA/PROVENTIL HFA/VENTOLIN HFA) 108 (90 BASE) MCG/ACT Inhaler     amitriptyline (ELAVIL) 25 MG tablet     artificial tears OINT ophthalmic ointment     aspirin (ASPIRIN CHILDRENS) 81 MG chewable tablet     benzocaine (TOPICALE XTRA) 20 % GEL     betamethasone valerate (VALISONE) 0.1 % cream     botulinum toxin type A (BOTOX) 100 UNITS injection     clobetasol (TEMOVATE) 0.05 % cream     colchicine (COLCYRS) 0.6 MG tablet     cyproheptadine (PERIACTIN) 4 MG tablet     dicyclomine (BENTYL) 20 MG tablet     diphenhydrAMINE (BENADRYL) 25 MG tablet     EPINEPHrine (EPIPEN 2-EAMON) 0.3 MG/0.3ML injection     gabapentin (NEURONTIN) 100 MG capsule     guanFACINE (TENEX) 1 MG tablet     hydrocortisone 1 % CREA cream     hyoscyamine (ANASPAZ/LEVSIN) 0.125 MG tablet     hypromellose (GENTEAL) 0.3 % SOLN     ibuprofen (ADVIL/MOTRIN) 600 MG tablet     lactulose 20 GM/30ML SOLN     linaclotide (LINZESS) 145 MCG capsule     Magic Mouthwash (FV std formula) lidocaine visc 2% 2.5mL/5mL & maalox/mylanta w/ simeth 2.5mL/5mL & diphenhydrAMINE 5mg/5mL     nitroFURantoin macrocrystal-monohydrate (MACROBID) 100 MG capsule     norgestimate-ethinyl estradiol (ORTHO-CYCLEN/SPRINTEC) 0.25-35 MG-MCG tablet     omeprazole (PRILOSEC) 40 MG capsule     OnabotulinumtoxinA (BOTOX IJ)     OnabotulinumtoxinA (BOTOX IJ)     OnabotulinumtoxinA  (BOTOX IJ)     OnabotulinumtoxinA (BOTOX IJ)     OnabotulinumtoxinA (BOTOX IJ)     ondansetron (ZOFRAN) 8 MG tablet     order for DME     order for DME     order for DME     order for DME     oxyCODONE (ROXICODONE) 5 MG tablet     polyethylene glycol (MIRALAX/GLYCOLAX) powder     sennosides (SENOKOT) 8.6 MG tablet     sucralfate (CARAFATE) 1 GM/10ML suspension     tiZANidine (ZANAFLEX) 4 MG capsule     vancomycin 1,500 mg     Facility-Administered Medications Ordered in Other Encounters   Medication     alteplase (CATHFLO ACTIVASE) injection 2 mg        Allergies   Allergen Reactions     Amoxil [Penicillins] Rash     Dad unsure of reaction.     Bee Venom Anaphylaxis     Contrast Dye Rash     Contrast [facility] [place], 04/09/2014.; Contrast [facility] [place], 04/09/2014.  NOTE: this is a contrast media oral with iodine. Premedicate with methylpred standard for IV contrast, request barium contrast for oral contrast.     Orange Fruit [Citrus] Anaphylaxis     Pineapple Anaphylaxis, Difficulty breathing and Rash     Reglan [Metoclopramide] Other (See Comments)     IV dose only, in ER, rapid heart rate.     Ace Inhibitors      Difficulty in breathing and GI upset     Amitiza [Lubiprostone] Nausea and Vomiting     Amoxicillin-Pot Clavulanate      Midazolam Unknown     Other reaction(s): Unknown  parent states that when pt takes this medication, she wakes up being very violent .  parent states that when pt takes this medication, she wakes up being very violent .     No Clinical Screening - See Comments      Bleech/ chest tightness, itchy throat, swollen tongue, hives     Tizanidine Other (See Comments)     Confusion, back pain, photophobia, abdominal pain, shaking, anxious       Versed      Coming out of pelvic exam at age of 5, was kicking and screaming when coming out of the versed.     Adhesive Tape Rash     Azithromycin Hives and Rash     Cephalexin Itching and Rash     Itchy mouth  Itchy mouth     Keflex  "[Cephalexin-Fd&C Yellow #6] Hives         Review of Systems   Constitutional: Positive for appetite change (Lack).   Eyes: Positive for photophobia.   Respiratory: Positive for cough and shortness of breath.    Gastrointestinal: Positive for abdominal pain (Epigastric), nausea and vomiting.   Genitourinary: Positive for flank pain (Left).   Musculoskeletal:        Positive for left foot pain   All other systems reviewed and are negative.      Physical Exam   BP: 99/68  Pulse: 80  Heart Rate: 93  Temp: 98.1  F (36.7  C)  Resp: 16  Height: 160 cm (5' 3\")  Weight: 68 kg (150 lb)  SpO2: 100 %      Physical Exam   Constitutional: No distress.   HENT:   Head: Atraumatic.   Mouth/Throat: Oropharynx is clear and moist. No oropharyngeal exudate.   Eyes: EOM are normal. No scleral icterus.   Neck: Neck supple.   Cardiovascular: Normal rate, regular rhythm and normal heart sounds.   Pulmonary/Chest: Breath sounds normal. No respiratory distress.   Abdominal: Soft.   Mild upper abdominal tenderness   Musculoskeletal: She exhibits no edema or deformity.   Left leg cam boot in place   Neurological: She is alert.   Skin: Skin is warm and dry. She is not diaphoretic.   Psychiatric: She has a normal mood and affect. Her behavior is normal.       ED Course   8:43 PM  The patient was seen and examined by Samir Ding DO in Room ED21.        Procedures           Labs Ordered and Resulted from Time of ED Arrival Up to the Time of Departure from the ED   CBC WITH PLATELETS DIFFERENTIAL - Abnormal; Notable for the following components:       Result Value    Hemoglobin 11.3 (*)     All other components within normal limits   COMPREHENSIVE METABOLIC PANEL - Abnormal; Notable for the following components:    Glucose 153 (*)     Creatinine 2.13 (*)     GFR Estimate 31 (*)     GFR Estimate If Black 36 (*)     Albumin 2.8 (*)     Protein Total 6.5 (*)     All other components within normal limits   LIPASE - Abnormal; Notable for the " following components:    Lipase 61 (*)     All other components within normal limits   CRP INFLAMMATION - Abnormal; Notable for the following components:    CRP Inflammation 140.0 (*)     All other components within normal limits   UA MACROSCOPIC WITH REFLEX TO MICRO AND CULTURE - Abnormal; Notable for the following components:    Protein Albumin Urine 10 (*)     RBC Urine 3 (*)     Bacteria Urine Few (*)     Mucous Urine Present (*)     All other components within normal limits   LACTIC ACID WHOLE BLOOD   HCG QUALITATIVE URINE     Results for orders placed or performed during the hospital encounter of 03/27/19   CT Abdomen Pelvis w/o Contrast    Narrative    Examination:  CT ABDOMEN PELVIS W/O CONTRAST 3/27/2019 9:44 PM     History: Abdominal pain, unspecified    Comparison: 1/8/2018    Technique: CT of the abdomen and pelvis were obtained without  contrast. Sagittal and coronal reconstructions created and reviewed.    Findings:   Liver, gallbladder, pancreas, and spleen are normal.     Kidneys and adrenal glands are normal. No hydronephrosis or  hydroureter.     Severe colonic stool burden with mildly dilated colon and decompressed  distal sigmoid. There is distention of the distal ileum, which is also  distended with fecal material. Additional mildly dilated loops of  distal ileum noted in the pelvis. No evidence of perforation.    Lower thorax: Unremarkable.    Bones and soft tissues: No acute or suspicious osseous abnormality.      Impression    Impression:   There is a large colonic stool burden. This causes distention of the  majority of the colon, with the exception of the distal sigmoid and  rectum, as well as dilatation of the distal ileum which contains  fecalized material. Findings concerning for sigmoid stricture and low  grade obstruction.      I have personally reviewed the examination and initial interpretation  and I agree with the findings.    RYLEE APODACA MD   CBC with platelets  differential   Result Value Ref Range    WBC 4.7 4.0 - 11.0 10e9/L    RBC Count 3.91 3.8 - 5.2 10e12/L    Hemoglobin 11.3 (L) 11.7 - 15.7 g/dL    Hematocrit 35.6 35.0 - 47.0 %    MCV 91 78 - 100 fl    MCH 28.9 26.5 - 33.0 pg    MCHC 31.7 31.5 - 36.5 g/dL    RDW 12.8 10.0 - 15.0 %    Platelet Count 273 150 - 450 10e9/L    Diff Method Automated Method     % Neutrophils 60.6 %    % Lymphocytes 18.9 %    % Monocytes 11.4 %    % Eosinophils 7.8 %    % Basophils 1.1 %    % Immature Granulocytes 0.2 %    Nucleated RBCs 0 0 /100    Absolute Neutrophil 2.9 1.6 - 8.3 10e9/L    Absolute Lymphocytes 0.9 0.8 - 5.3 10e9/L    Absolute Monocytes 0.5 0.0 - 1.3 10e9/L    Absolute Eosinophils 0.4 0.0 - 0.7 10e9/L    Absolute Basophils 0.1 0.0 - 0.2 10e9/L    Abs Immature Granulocytes 0.0 0 - 0.4 10e9/L    Absolute Nucleated RBC 0.0    Comprehensive metabolic panel   Result Value Ref Range    Sodium 136 133 - 144 mmol/L    Potassium 5.2 3.4 - 5.3 mmol/L    Chloride 107 94 - 109 mmol/L    Carbon Dioxide 21 20 - 32 mmol/L    Anion Gap 8 3 - 14 mmol/L    Glucose 153 (H) 70 - 99 mg/dL    Urea Nitrogen 19 7 - 30 mg/dL    Creatinine 2.13 (H) 0.52 - 1.04 mg/dL    GFR Estimate 31 (L) >60 mL/min/[1.73_m2]    GFR Estimate If Black 36 (L) >60 mL/min/[1.73_m2]    Calcium 8.5 8.5 - 10.1 mg/dL    Bilirubin Total 0.4 0.2 - 1.3 mg/dL    Albumin 2.8 (L) 3.4 - 5.0 g/dL    Protein Total 6.5 (L) 6.8 - 8.8 g/dL    Alkaline Phosphatase 93 40 - 150 U/L    ALT 24 0 - 50 U/L    AST 39 0 - 45 U/L   Lipase   Result Value Ref Range    Lipase 61 (L) 73 - 393 U/L   Lactic acid whole blood   Result Value Ref Range    Lactic Acid 1.3 0.7 - 2.0 mmol/L   CRP inflammation   Result Value Ref Range    CRP Inflammation 140.0 (H) 0.0 - 8.0 mg/L   UA reflex to Microscopic and Culture   Result Value Ref Range    Color Urine Yellow     Appearance Urine Cloudy     Glucose Urine Negative NEG^Negative mg/dL    Bilirubin Urine Negative NEG^Negative    Ketones Urine Negative  NEG^Negative mg/dL    Specific Gravity Urine 1.014 1.003 - 1.035    Blood Urine Negative NEG^Negative    pH Urine 5.5 5.0 - 7.0 pH    Protein Albumin Urine 10 (A) NEG^Negative mg/dL    Urobilinogen mg/dL Normal 0.0 - 2.0 mg/dL    Nitrite Urine Negative NEG^Negative    Leukocyte Esterase Urine Negative NEG^Negative    Source Clean catch urine     RBC Urine 3 (H) 0 - 2 /HPF    WBC Urine 2 0 - 5 /HPF    Bacteria Urine Few (A) NEG^Negative /HPF    Squamous Epithelial /HPF Urine <1 0 - 1 /HPF    Transitional Epi <1 0 - 1 /HPF    Mucous Urine Present (A) NEG^Negative /LPF   HCG qualitative urine (UPT)   Result Value Ref Range    HCG Qual Urine Negative NEG^Negative     *Note: Due to a large number of results and/or encounters for the requested time period, some results have not been displayed. A complete set of results can be found in Results Review.            Assessments & Plan (with Medical Decision Making)   23-year-old female presents to us with a chief complaint of abdominal pain, dehydration, nausea and vomiting.  Differential includes but not limited to urinary tract infection, gastritis, gastroenteritis, nausea and vomiting, bowel obstruction.  Patient's creatinine is markedly elevated at 2.1.  Her baseline is approximately 0.6.  She has had worsening abdominal pain as well as nausea and vomiting.  She has a history of chronic constipation that was again demonstrated on CT scan today.  Patient was given IV fluids as well as nausea control.  Patient's white count and lactic acid are normal.  She does not report worsening pain on her previous surgery of her left ankle.  She has been on the vancomycin as recommended at discharge over this month.  General surgery was consulted due to the possibility of sigmoid stricture seen on CT scan.  We will admit the patient to the internal medicine service.      I have reviewed the nursing notes.    I have reviewed the findings, diagnosis, plan and need for follow up with the  patient.       Medication List      ASK your doctor about these medications    fluconazole 150 MG tablet  Commonly known as:  DIFLUCAN  150 mg, Oral, ONCE  Ask about: Should I take this medication?            Final diagnoses:   Abdominal pain, generalized   Acute kidney injury (H)   Chronic constipation     Nelson ROJO, am serving as a trained medical scribe to document services personally performed by Samir Ding DO, based on the provider's statements to me.   Samir ROJO DO, was physically present and have reviewed and verified the accuracy of this note documented by Nelson Ravi.    3/27/2019   [facility], [place], EMERGENCY DEPARTMENT     Samir Ding DO  03/27/19 [id number]

## 2019-03-28 NOTE — PROGRESS NOTES
Clinic Care Coordination Contact    Clinic Care Coordination Contact  OUTREACH    Referral Information:  Referral Source: PCP    Primary Diagnosis: Psychosocial    Chief Complaint   Patient presents with     Clinic Care Coordination - Follow-up             Emmalena Utilization: [facility] Utilization  Difficulty keeping appointments:: No  Compliance Concerns: No  No-Show Concerns: No  No PCP office visit in Past Year: No  Utilization    Last refreshed: 3/28/2019  3:12 AM:  Hospital Admissions 1           Last refreshed: 3/28/2019  3:12 AM:  ED Visits 3           Last refreshed: 3/28/2019  3:12 AM:  No Show Count (past year) 3              Current as of: 3/28/2019  3:12 AM              Clinical Concerns:  Current Medical Concerns:  Pt reports there have been multiple complications regarding her overall medical health. Pt went on to discuss that she was recently in the ED but left against medical advice because she was not feeling comfortable since they did not have a bed for her.     Current Behavioral Concerns: Pt expressed an increased in behavioral health. Pt stated that she has been feeling low due to all the medical concerns.       Education Provided to patient: MACIEJ talked with Pt over the phone. MACIEJ talked with Pt about going back to the ED to get the help she needs. Pt reports that this is something she would like to do. SW provided patient with the number to the [facility] social security office and address. Pt stated she would like help walking through this process once she is back from the ED.       Health Maintenance Reviewed: Due/Overdue   Health Maintenance Due   Topic Date Due     PREVENTIVE CARE VISIT  08/11/2010     INFLUENZA VACCINE (1) 09/01/2018     CHLAMYDIA SCREENING  10/24/2018       Clinical Pathway: None    Medication Management:  Pt reports she can take her own      Functional Status:  Bed or wheelchair confined:: No    Living  Situation:  Current living arrangement:: I live in a private home  Type of residence:: Private home - stairs    Diet/Exercise/Sleep:       Transportation:  Transportation concerns (GOAL):: No        Psychosocial:  Yarsanism or spiritual beliefs that impact treatment:: No  Mental health DX:: Yes  Mental health DX how managed:: Medication, Psychiatrist  Mental health management concern (GOAL):: No  Informal Support system:: Significant other     Financial/Insurance:   Financial/Insurance concerns (GOAL):: Yes  Pt would like help figuring out social security disability.      Resources and Interventions:  Current Resources:    ;         Equipment Currently Used at Home: crutches    Advance Care Plan/Directive  Advanced Care Plans/Directives on file:: No    Referrals Placed: Disability Linkage line(sent information to patient )     Goals:   Goals        General    #1 Financial Wellbeing (pt-stated)     Notes - Note created  3/28/2019  2:58 PM by Maeve Purcell BSW    Goal Statement: I need help applying for disability    Measure of Success: Pt will apply for disability   Supportive Steps to Achieve: care coordination   Barriers: unable to navigate on own  Strengths: seeking help  Date to Achieve By: 5/1/19   Patient expressed understanding of goal: yes                  Patient/Caregiver understanding: Pt reported good understanding.     Outreach Frequency: monthly  Future Appointments              Today Sonja Abreu APRN [facility], [facility]    In 5 days Andres Johnson DPM [facility]    In 6 days [facility]a Glover MD [facility]    In 6 days Rosangela Hollingsworth, [facility] Integrated Primary Care MARIELA REGALADO    [facility], [facility]    In 1 week Gerda Mehta MD [facility], Infectious Disease, [place]    In 1 week [facility], [facility]     In 2 weeks Ernestina Patel [facility], [facility]    In 2 weeks  MASONIC LAB DRAW [facility] Lab Draw, [facility]    In 3 weeks Ernestina Patel [facility], [facility]    In 1 month Alejandrina Hernandez MD [facility] Physical Medicine and Rehabilitation, [facility]    In 1 month Ernestina Patel [facility], [facility]    In 2 months Ernestina Patel [facility], [facility]    In 3 months Austen Marquez MD [facility],     In 7 months Joseph De La Torre [facility]          Plan: 1) SW in with the Pt in 2 weeks.   2) Pt will contact SW with any CC needs.   3) SW will follow up in 3 weeks.       ADRIANA Martinez  Clinic Care Coordinator   [facility]   [phone]

## 2019-03-29 ENCOUNTER — TELEPHONE (OUTPATIENT)
Dept: FAMILY MEDICINE | Facility: CLINIC | Age: 24
End: 2019-03-29

## 2019-03-29 ENCOUNTER — HOME INFUSION (PRE-WILLOW HOME INFUSION) (OUTPATIENT)
Dept: PHARMACY | Facility: CLINIC | Age: 24
End: 2019-03-29

## 2019-03-29 LAB
BUN SERPL-MCNC: 17 MG/DL (ref 7–30)
CREAT SERPL-MCNC: 1.94 MG/DL (ref 0.52–1.04)
CRP SERPL-MCNC: 58.5 MG/L (ref 0–8)
ERYTHROCYTE [SEDIMENTATION RATE] IN BLOOD BY WESTERGREN METHOD: 18 MM/H (ref 0–20)
GFR SERPL CREATININE-BSD FRML MDRD: 35 ML/MIN/{1.73_M2}
VANCOMYCIN SERPL-MCNC: 13.3 MG/L

## 2019-03-29 PROCEDURE — 82565 ASSAY OF CREATININE: CPT | Performed by: INTERNAL MEDICINE

## 2019-03-29 PROCEDURE — 80202 ASSAY OF VANCOMYCIN: CPT | Performed by: INTERNAL MEDICINE

## 2019-03-29 PROCEDURE — 85652 RBC SED RATE AUTOMATED: CPT | Performed by: INTERNAL MEDICINE

## 2019-03-29 PROCEDURE — 84520 ASSAY OF UREA NITROGEN: CPT | Performed by: INTERNAL MEDICINE

## 2019-03-29 PROCEDURE — 86140 C-REACTIVE PROTEIN: CPT | Performed by: INTERNAL MEDICINE

## 2019-03-29 NOTE — TELEPHONE ENCOUNTER
Reason for call:  Order   Order or referral being requested: orders for home hydration and IV fluids for home care and pharmacy  Reason for request: dehydration  Date needed: as soon as possible  Has the patient been seen by the PCP for this problem? NO    Additional comments: n/a    Phone number to reach patient:  Other phone number:  [phone]    Best Time:  n/a    Can we leave a detailed message on this number?  YES

## 2019-03-29 NOTE — PROGRESS NOTES
This is a recent snapshot of the patient's [facility] medical record.  For current drug dose and complete information and questions, call [phone] or In Basket pool, fv home infusion ([id number])  CSN Number:  [id number]

## 2019-03-29 NOTE — TELEPHONE ENCOUNTER
Sonja/Provider:    Spoke with Ciara [facility] nurse. She is requesting orders for 1-2 L of fluids via IV daily.    Pt is currently on vanco treatment. HC is going out to draw labs and do IV administration of abx. Per Ciara, pt is drinking some, but is very dry, and historically has complex medical issues. Last creat was high, so hopefully this will help.    Okay for triage nurse to give verbal order/okay for fluids?    Criselda Sanabria RN  [facility]

## 2019-03-29 NOTE — TELEPHONE ENCOUNTER
Called Ciara to give message from Dr. Macdonald: Yes, please notify, thanks Moustapha. Verbal okay given for fluids via IV.  Asked Ciara if our clinic needs to call the home infusion pharmacy to give order. She will call Kirby the pharmacist who will fax over the order for us to sign.    Criselda Sanabria RN  [facility]

## 2019-03-30 ENCOUNTER — APPOINTMENT (OUTPATIENT)
Dept: GENERAL RADIOLOGY | Facility: CLINIC | Age: 24
DRG: 389 | End: 2019-03-30
Attending: EMERGENCY MEDICINE
Payer: COMMERCIAL

## 2019-03-30 ENCOUNTER — APPOINTMENT (OUTPATIENT)
Dept: GENERAL RADIOLOGY | Facility: CLINIC | Age: 24
DRG: 389 | End: 2019-03-30
Payer: COMMERCIAL

## 2019-03-30 ENCOUNTER — HOSPITAL ENCOUNTER (INPATIENT)
Facility: CLINIC | Age: 24
LOS: 2 days | Discharge: HOME | DRG: 389 | End: 2019-04-01
Attending: EMERGENCY MEDICINE | Admitting: INTERNAL MEDICINE
Payer: COMMERCIAL

## 2019-03-30 ENCOUNTER — HOME INFUSION (PRE-WILLOW HOME INFUSION) (OUTPATIENT)
Dept: PHARMACY | Facility: CLINIC | Age: 24
End: 2019-03-30

## 2019-03-30 ENCOUNTER — APPOINTMENT (OUTPATIENT)
Dept: CT IMAGING | Facility: CLINIC | Age: 24
DRG: 389 | End: 2019-03-30
Attending: STUDENT IN AN ORGANIZED HEALTH CARE EDUCATION/TRAINING PROGRAM
Payer: COMMERCIAL

## 2019-03-30 DIAGNOSIS — R10.9 ACUTE ABDOMINAL PAIN: ICD-10-CM

## 2019-03-30 DIAGNOSIS — R11.2 NAUSEA AND VOMITING, INTRACTABILITY OF VOMITING NOT SPECIFIED, UNSPECIFIED VOMITING TYPE: ICD-10-CM

## 2019-03-30 DIAGNOSIS — K58.2 IRRITABLE BOWEL SYNDROME WITH BOTH CONSTIPATION AND DIARRHEA: Primary | ICD-10-CM

## 2019-03-30 DIAGNOSIS — L03.116 CELLULITIS OF LEFT LOWER EXTREMITY: ICD-10-CM

## 2019-03-30 DIAGNOSIS — N17.9 ACUTE KIDNEY INJURY (H): ICD-10-CM

## 2019-03-30 DIAGNOSIS — K56.699 SIGMOID STRICTURE (H): ICD-10-CM

## 2019-03-30 LAB
ALBUMIN SERPL-MCNC: 3.4 G/DL (ref 3.4–5)
ALBUMIN UR-MCNC: 10 MG/DL
ALP SERPL-CCNC: 126 U/L (ref 40–150)
ALT SERPL W P-5'-P-CCNC: 46 U/L (ref 0–50)
ANION GAP SERPL CALCULATED.3IONS-SCNC: 11 MMOL/L (ref 3–14)
APPEARANCE UR: CLEAR
AST SERPL W P-5'-P-CCNC: 57 U/L (ref 0–45)
BASOPHILS # BLD AUTO: 0 10E9/L (ref 0–0.2)
BASOPHILS NFR BLD AUTO: 1.1 %
BILIRUB SERPL-MCNC: 0.2 MG/DL (ref 0.2–1.3)
BILIRUB UR QL STRIP: NEGATIVE
BUN SERPL-MCNC: 15 MG/DL (ref 7–30)
CALCIUM SERPL-MCNC: 8.9 MG/DL (ref 8.5–10.1)
CHLORIDE SERPL-SCNC: 109 MMOL/L (ref 94–109)
CO2 SERPL-SCNC: 20 MMOL/L (ref 20–32)
COLOR UR AUTO: YELLOW
CREAT SERPL-MCNC: 1.87 MG/DL (ref 0.52–1.04)
CRP SERPL-MCNC: 19 MG/L (ref 0–8)
DIFFERENTIAL METHOD BLD: ABNORMAL
EOSINOPHIL # BLD AUTO: 0.2 10E9/L (ref 0–0.7)
EOSINOPHIL NFR BLD AUTO: 4.5 %
ERYTHROCYTE [DISTWIDTH] IN BLOOD BY AUTOMATED COUNT: 12.2 % (ref 10–15)
ERYTHROCYTE [SEDIMENTATION RATE] IN BLOOD BY WESTERGREN METHOD: 23 MM/H (ref 0–20)
GFR SERPL CREATININE-BSD FRML MDRD: 37 ML/MIN/{1.73_M2}
GLUCOSE SERPL-MCNC: 80 MG/DL (ref 70–99)
GLUCOSE UR STRIP-MCNC: NEGATIVE MG/DL
GRAN CASTS #/AREA URNS LPF: 1 /LPF
HCT VFR BLD AUTO: 41.7 % (ref 35–47)
HGB BLD-MCNC: 13.8 G/DL (ref 11.7–15.7)
HGB UR QL STRIP: NEGATIVE
HYALINE CASTS #/AREA URNS LPF: 2 /LPF (ref 0–2)
IMM GRANULOCYTES # BLD: 0 10E9/L (ref 0–0.4)
IMM GRANULOCYTES NFR BLD: 0.8 %
KETONES UR STRIP-MCNC: 5 MG/DL
LACTATE BLD-SCNC: 1.1 MMOL/L (ref 0.7–2)
LEUKOCYTE ESTERASE UR QL STRIP: NEGATIVE
LIPASE SERPL-CCNC: 106 U/L (ref 73–393)
LYMPHOCYTES # BLD AUTO: 1.3 10E9/L (ref 0.8–5.3)
LYMPHOCYTES NFR BLD AUTO: 33 %
MCH RBC QN AUTO: 29.3 PG (ref 26.5–33)
MCHC RBC AUTO-ENTMCNC: 33.1 G/DL (ref 31.5–36.5)
MCV RBC AUTO: 89 FL (ref 78–100)
MONOCYTES # BLD AUTO: 0.4 10E9/L (ref 0–1.3)
MONOCYTES NFR BLD AUTO: 10 %
MUCOUS THREADS #/AREA URNS LPF: PRESENT /LPF
NEUTROPHILS # BLD AUTO: 1.9 10E9/L (ref 1.6–8.3)
NEUTROPHILS NFR BLD AUTO: 50.6 %
NITRATE UR QL: NEGATIVE
NRBC # BLD AUTO: 0 10*3/UL
NRBC BLD AUTO-RTO: 0 /100
PH UR STRIP: 5 PH (ref 5–7)
PLATELET # BLD AUTO: 303 10E9/L (ref 150–450)
POTASSIUM SERPL-SCNC: 4.3 MMOL/L (ref 3.4–5.3)
PROT SERPL-MCNC: 7.6 G/DL (ref 6.8–8.8)
RBC # BLD AUTO: 4.71 10E12/L (ref 3.8–5.2)
RBC #/AREA URNS AUTO: 1 /HPF (ref 0–2)
SODIUM SERPL-SCNC: 140 MMOL/L (ref 133–144)
SOURCE: ABNORMAL
SP GR UR STRIP: 1.01 (ref 1–1.03)
SQUAMOUS #/AREA URNS AUTO: 1 /HPF (ref 0–1)
UROBILINOGEN UR STRIP-MCNC: NORMAL MG/DL (ref 0–2)
WBC # BLD AUTO: 3.8 10E9/L (ref 4–11)
WBC #/AREA URNS AUTO: 5 /HPF (ref 0–5)

## 2019-03-30 PROCEDURE — 99285 EMERGENCY DEPT VISIT HI MDM: CPT | Mod: 25

## 2019-03-30 PROCEDURE — 86140 C-REACTIVE PROTEIN: CPT | Performed by: EMERGENCY MEDICINE

## 2019-03-30 PROCEDURE — 85025 COMPLETE CBC W/AUTO DIFF WBC: CPT | Performed by: EMERGENCY MEDICINE

## 2019-03-30 PROCEDURE — 36415 COLL VENOUS BLD VENIPUNCTURE: CPT | Performed by: EMERGENCY MEDICINE

## 2019-03-30 PROCEDURE — 93010 ELECTROCARDIOGRAM REPORT: CPT | Performed by: INTERNAL MEDICINE

## 2019-03-30 PROCEDURE — 83605 ASSAY OF LACTIC ACID: CPT | Performed by: EMERGENCY MEDICINE

## 2019-03-30 PROCEDURE — 99285 EMERGENCY DEPT VISIT HI MDM: CPT | Mod: Z6 | Performed by: EMERGENCY MEDICINE

## 2019-03-30 PROCEDURE — 25000132 ZZH RX MED GY IP 250 OP 250 PS 637: Performed by: STUDENT IN AN ORGANIZED HEALTH CARE EDUCATION/TRAINING PROGRAM

## 2019-03-30 PROCEDURE — 99222 1ST HOSP IP/OBS MODERATE 55: CPT | Mod: GC | Performed by: INTERNAL MEDICINE

## 2019-03-30 PROCEDURE — 25000128 H RX IP 250 OP 636: Performed by: EMERGENCY MEDICINE

## 2019-03-30 PROCEDURE — 25800030 ZZH RX IP 258 OP 636: Performed by: EMERGENCY MEDICINE

## 2019-03-30 PROCEDURE — 12000001 ZZH R&B MED SURG/OB UMMC

## 2019-03-30 PROCEDURE — 96376 TX/PRO/DX INJ SAME DRUG ADON: CPT

## 2019-03-30 PROCEDURE — 74019 RADEX ABDOMEN 2 VIEWS: CPT

## 2019-03-30 PROCEDURE — 80053 COMPREHEN METABOLIC PANEL: CPT | Performed by: EMERGENCY MEDICINE

## 2019-03-30 PROCEDURE — 96374 THER/PROPH/DIAG INJ IV PUSH: CPT

## 2019-03-30 PROCEDURE — 74176 CT ABD & PELVIS W/O CONTRAST: CPT

## 2019-03-30 PROCEDURE — 85652 RBC SED RATE AUTOMATED: CPT | Performed by: EMERGENCY MEDICINE

## 2019-03-30 PROCEDURE — 96361 HYDRATE IV INFUSION ADD-ON: CPT

## 2019-03-30 PROCEDURE — 71045 X-RAY EXAM CHEST 1 VIEW: CPT

## 2019-03-30 PROCEDURE — 83690 ASSAY OF LIPASE: CPT | Performed by: EMERGENCY MEDICINE

## 2019-03-30 PROCEDURE — 93005 ELECTROCARDIOGRAM TRACING: CPT

## 2019-03-30 PROCEDURE — 25000125 ZZHC RX 250: Performed by: STUDENT IN AN ORGANIZED HEALTH CARE EDUCATION/TRAINING PROGRAM

## 2019-03-30 PROCEDURE — 25000132 ZZH RX MED GY IP 250 OP 250 PS 637: Performed by: INTERNAL MEDICINE

## 2019-03-30 PROCEDURE — 81001 URINALYSIS AUTO W/SCOPE: CPT | Performed by: EMERGENCY MEDICINE

## 2019-03-30 RX ORDER — GUANFACINE 1 MG/1
1 TABLET ORAL 2 TIMES DAILY
Status: DISCONTINUED | OUTPATIENT
Start: 2019-03-30 | End: 2019-03-30

## 2019-03-30 RX ORDER — GUANFACINE 1 MG/1
3 TABLET ORAL 2 TIMES DAILY
Status: DISCONTINUED | OUTPATIENT
Start: 2019-03-30 | End: 2019-04-01 | Stop reason: HOSPADM

## 2019-03-30 RX ORDER — NALOXONE HYDROCHLORIDE 0.4 MG/ML
.1-.4 INJECTION, SOLUTION INTRAMUSCULAR; INTRAVENOUS; SUBCUTANEOUS
Status: DISCONTINUED | OUTPATIENT
Start: 2019-03-30 | End: 2019-04-01 | Stop reason: HOSPADM

## 2019-03-30 RX ORDER — DIPHENHYDRAMINE HYDROCHLORIDE AND LIDOCAINE HYDROCHLORIDE AND ALUMINUM HYDROXIDE AND MAGNESIUM HYDRO
10 KIT EVERY 6 HOURS PRN
Status: DISCONTINUED | OUTPATIENT
Start: 2019-03-30 | End: 2019-04-01 | Stop reason: HOSPADM

## 2019-03-30 RX ORDER — SODIUM CHLORIDE, SODIUM LACTATE, POTASSIUM CHLORIDE, CALCIUM CHLORIDE 600; 310; 30; 20 MG/100ML; MG/100ML; MG/100ML; MG/100ML
INJECTION, SOLUTION INTRAVENOUS CONTINUOUS
Status: ACTIVE | OUTPATIENT
Start: 2019-03-30 | End: 2019-03-31

## 2019-03-30 RX ORDER — ALBUTEROL SULFATE 90 UG/1
2 AEROSOL, METERED RESPIRATORY (INHALATION) EVERY 6 HOURS PRN
Status: DISCONTINUED | OUTPATIENT
Start: 2019-03-30 | End: 2019-04-01 | Stop reason: HOSPADM

## 2019-03-30 RX ORDER — SODIUM CHLORIDE 9 MG/ML
1000 INJECTION, SOLUTION INTRAVENOUS CONTINUOUS
Status: DISCONTINUED | OUTPATIENT
Start: 2019-03-30 | End: 2019-04-01

## 2019-03-30 RX ORDER — CLOBETASOL PROPIONATE 0.5 MG/G
CREAM TOPICAL 2 TIMES DAILY
Status: DISCONTINUED | OUTPATIENT
Start: 2019-03-30 | End: 2019-04-01 | Stop reason: HOSPADM

## 2019-03-30 RX ORDER — BETAMETHASONE VALERATE 1.2 MG/G
CREAM TOPICAL 2 TIMES DAILY
Status: DISCONTINUED | OUTPATIENT
Start: 2019-03-30 | End: 2019-04-01 | Stop reason: HOSPADM

## 2019-03-30 RX ORDER — ONDANSETRON 2 MG/ML
4 INJECTION INTRAMUSCULAR; INTRAVENOUS EVERY 30 MIN PRN
Status: COMPLETED | OUTPATIENT
Start: 2019-03-30 | End: 2019-04-01

## 2019-03-30 RX ORDER — ACETAMINOPHEN 325 MG/1
650 TABLET ORAL EVERY 4 HOURS PRN
Status: DISCONTINUED | OUTPATIENT
Start: 2019-03-30 | End: 2019-04-01 | Stop reason: HOSPADM

## 2019-03-30 RX ADMIN — ONDANSETRON 4 MG: 2 INJECTION INTRAMUSCULAR; INTRAVENOUS at 14:29

## 2019-03-30 RX ADMIN — AMITRIPTYLINE HYDROCHLORIDE 25 MG: 25 TABLET, FILM COATED ORAL at 21:31

## 2019-03-30 RX ADMIN — SODIUM CHLORIDE, PRESERVATIVE FREE 1000 ML: 5 INJECTION INTRAVENOUS at 15:05

## 2019-03-30 RX ADMIN — ONDANSETRON 4 MG: 2 INJECTION INTRAMUSCULAR; INTRAVENOUS at 13:14

## 2019-03-30 RX ADMIN — Medication: at 21:31

## 2019-03-30 RX ADMIN — SODIUM CHLORIDE 1000 ML: 900 INJECTION, SOLUTION INTRAVENOUS at 13:14

## 2019-03-30 RX ADMIN — GUANFACINE HYDROCHLORIDE 3 MG: 1 TABLET ORAL at 23:16

## 2019-03-30 RX ADMIN — ACETAMINOPHEN 650 MG: 325 TABLET, FILM COATED ORAL at 21:31

## 2019-03-30 ASSESSMENT — ACTIVITIES OF DAILY LIVING (ADL)
RETIRED_COMMUNICATION: 0-->UNDERSTANDS/COMMUNICATES WITHOUT DIFFICULTY
WHICH_OF_THE_ABOVE_FUNCTIONAL_RISKS_HAD_A_RECENT_ONSET_OR_CHANGE?: AMBULATION;FALL HISTORY
NUMBER_OF_TIMES_PATIENT_HAS_FALLEN_WITHIN_LAST_SIX_MONTHS: 5
AMBULATION: 1-->ASSISTIVE EQUIPMENT
FALL_HISTORY_WITHIN_LAST_SIX_MONTHS: YES
RETIRED_EATING: 0-->INDEPENDENT
COGNITION: 0 - NO COGNITION ISSUES REPORTED
SWALLOWING: 0-->SWALLOWS FOODS/LIQUIDS WITHOUT DIFFICULTY
TRANSFERRING: 1-->ASSISTIVE EQUIPMENT
TOILETING: 1-->ASSISTIVE EQUIPMENT
BATHING: 1-->ASSISTIVE EQUIPMENT
ADLS_ACUITY_SCORE: 12
DRESS: 0-->INDEPENDENT

## 2019-03-30 ASSESSMENT — ENCOUNTER SYMPTOMS
VOMITING: 1
HEADACHES: 1
ABDOMINAL PAIN: 1
NAUSEA: 1

## 2019-03-30 ASSESSMENT — MIFFLIN-ST. JEOR
SCORE: 1399.53
SCORE: 1427.65

## 2019-03-30 NOTE — SUMMARY OF CARE
Cell phone without , glasses, half pair sneakers, suitcase, left leg boot, purse, clutches, black pants

## 2019-03-30 NOTE — ED NOTES
[facility], [place]   ED Nurse to Floor Handoff     Samara Oropeza is a 23 year old female who speaks English and lives with others,  in a home  They arrived in the ED by ambulance from home    ED Chief Complaint: Abdominal Pain    ED Dx;   Final diagnoses:   Acute kidney injury (H)   Nausea and vomiting, intractability of vomiting not specified, unspecified vomiting type         Needed?: No    Allergies:   Allergies   Allergen Reactions     Amoxil [Penicillins] Rash     Dad unsure of reaction.     Bee Venom Anaphylaxis     Contrast Dye Rash     Contrast Media Ready-Box [facility], 04/09/2014.; Contrast Media Ready-Box [facility], 04/09/2014.  NOTE: this is a contrast media oral with iodine. Premedicate with methylpred standard for IV contrast, request barium contrast for oral contrast.     Orange Fruit [Citrus] Anaphylaxis     Pineapple Anaphylaxis, Difficulty breathing and Rash     Reglan [Metoclopramide] Other (See Comments)     IV dose only, in ER, rapid heart rate.     Ace Inhibitors      Difficulty in breathing and GI upset     Amitiza [Lubiprostone] Nausea and Vomiting     Amoxicillin-Pot Clavulanate      Midazolam Unknown     Other reaction(s): Unknown  parent states that when pt takes this medication, she wakes up being very violent .  parent states that when pt takes this medication, she wakes up being very violent .     No Clinical Screening - See Comments      Bleech/ chest tightness, itchy throat, swollen tongue, hives     Tizanidine Other (See Comments)     Confusion, back pain, photophobia, abdominal pain, shaking, anxious       Versed      Coming out of pelvic exam at age of 5, was kicking and screaming when coming out of the versed.     Adhesive Tape Rash     Azithromycin Hives and Rash     Cephalexin Itching and Rash     Itchy mouth  Itchy mouth     Keflex [Cephalexin-Fd&C Yellow #6] Hives   .  Past Medical Hx:   Past Medical History:   Diagnosis Date     Anemia       Anxiety      Arthritis      Behcet's disease (H)      Cervical adenitis May 2010     Chronic abdominal pain      Constipation, chronic 1994     Fibromyalgia      Gastro-oesophageal reflux disease      Gastroparesis      Irregular heart beat     tachycardia, has had workup     Migraines      Neuromuscular disorder (H)     fibramyalgia     Palpitations      Seizure (H)      Seizures (H)     unknown etiology     Syncope      Tourette's       Baseline Mental status: WDL  Current Mental Status changes: at basesline    Infection present or suspected this encounter: yes skin/wound/contact  Sepsis suspected: No  Isolation type: No active isolations     Activity level - Baseline/Home:  Stand with Assist  Activity Level - Current:   Stand with Assist    Bariatric equipment needed?: No    In the ED these meds were given:   Medications   0.9% sodium chloride BOLUS (1,000 mLs Intravenous New Bag 3/30/19 1314)     Followed by   sodium chloride 0.9% infusion (not administered)   ondansetron (ZOFRAN) injection 4 mg (4 mg Intravenous Given 3/30/19 7345)       Drips running?  Yes    Home pump  No    Current LDAs  PICC Single Lumen 03/13/19 Right Brachial vein medial (Active)   Number of days: 17       Wound 03/09/19 Distal Foot Other (comment) Purulent drainage from surgical incisional site.  (Active)   Number of days: 21       Incision/Surgical Site 01/02/19 Left Ankle (Active)   Number of days: 87       Incision/Surgical Site 03/12/19 Left;Lateral Ankle (Active)   Dressing Intervention Clean, dry, intact 3/30/2019 12:20 PM   Number of days: 18       Labs results:   Labs Ordered and Resulted from Time of ED Arrival Up to the Time of Departure from the ED   CBC WITH PLATELETS DIFFERENTIAL - Abnormal; Notable for the following components:       Result Value    WBC 3.8 (*)     All other components within normal limits   COMPREHENSIVE METABOLIC PANEL - Abnormal; Notable for the following components:    Creatinine 1.87 (*)     GFR  "Estimate 37 (*)     GFR Estimate If Black 43 (*)     AST 57 (*)     All other components within normal limits   UA MACROSCOPIC WITH REFLEX TO MICRO AND CULTURE - Abnormal; Notable for the following components:    Ketones Urine 5 (*)     Protein Albumin Urine 10 (*)     Mucous Urine Present (*)     Granular Casts 1 (*)     All other components within normal limits   LACTIC ACID WHOLE BLOOD   PERIPHERAL IV CATHETER       Imaging Studies:   Recent Results (from the past 24 hour(s))   XR Abdomen 2 Views    Narrative    XR ABDOMEN 2 VW 3/30/2019 1:28 PM    History: abd pain, nausea, hx severe constipation ? obstruction    Comparison: 3/27/2018    Findings: Moderate fecal load throughout the colon and rectum.  Nonobstructive bowel gas pattern. No intra-abdominal free air. No  suspicious calcification. Bones are unremarkable.      Impression    Impression: Moderate fecal load with nonobstructive bowel gas pattern.    DAGMAR MAI       Recent vital signs:   BP (!) 144/104   Pulse 97   Temp 97.7  F (36.5  C) (Oral)   Resp 16   Ht 1.6 m (5' 3\")   Wt 68 kg (150 lb)   LMP 03/09/2019   SpO2 99%   BMI 26.57 kg/m      Cardiac Rhythm: Normal Sinus  Pt needs tele? No  Skin/wound Issues: Wound LLE- treated with daily dressing change and IV Vanco on PICC    Code Status: Full Code    Pain control: fair    Nausea control: fair    Abnormal labs/tests/findings requiring intervention: Creat,labs    Family present during ED course? Yes   Family Comments/Social Situation comments: Needs private room and has anxiety about hospital stay     Tasks needing completion: None    Cecy Wheatley RN    [phone] [facility] ED      "

## 2019-03-30 NOTE — H&P
Resident/Fellow Attestation   I, Rm Aaron, was present with the medical student who participated in the service and in the documentation of the note.  I have verified the history and personally performed the physical exam and medical decision making.  I agree with the assessment and plan of care as documented in the note.      My edits are in bold    Rm Aaron MD  PGY3  Date of Service (when I saw the patient): 03/30/19    [facility], [place]    History and Physical - Maroon 1 Service        Date of Admission:  3/30/2019    Assessment & Plan   Samara Oropeza is a 23 year old female with PMH significant for chronic constipation, gastroparesis, and recent left ankle MSSA osteomyelitis on oral clindamycin (now discontinued) and IV vancomycin admitted on 3/30/2019 due to 7 day history of worsening periumbilical abdominal pain and 22 day hx of alternating constipation and diarrhea with maroon colored stools and abdominal CT 3 days ago concerning for sigmoid stricture and low grade obstruction.    #Abdominal Pain  #Nausea  #Vomiting  #Constipation  #Episodic diarrhea  #?Hematochezia  DDx includes colonic obstruction 2/2 sigmoid stricture from inflammatory colitis or anatomical abnormality. Infectious, namely C diff as patient received clinda for treatment of left lower leg cellulitis. She also has had chronic IBD. She has elevated CRP and ESR, which could be from recent soft tissue infection. Lactic acid negative. Obstruction could be facilitated by chronic opiate use. Stricture could be caused by behcet, although rare, it can involve GI track and she has had endoscopic work up in the past because of concerns for behcets. HCG negative so less suspicious for pregnancy or ectopic pregnancy. Denies recent sexual activity and and STD, therefore PID less in the differential. Can't rule out  Hirschsprung disease, but this would be a very late manifestation. She came in on 03/27/19  "with above symptoms and had abdominal CT showing sigmoid stricture and low grade obstruction. Of note, colonoscopy in 01/2018 did not show any abnormalities.   - LR @ 100mL/hr  - NPO  - Cdiff and enteric panel due to diarrhea at home  - Ondansetron PRN  - CT abdomen w/o contrast  - Daily CBC  - GI consult for colonoscopys    #JOSE JUAN  DDx includes medication induced as patient had elevated trough on 03/27/19 which coincided with elevated Cr. Suspect intrinsic injury. She also takes colchicine and NSAIDs. There could be a pre-renal component from low PO intake. Cr peak at 2.13. Has granular casts present on UA suggestive of AIN if WBC.   - Hold colchicine, NSAIDs  - FENa  - Fluids as above  - Strict I/Os  - Daily BMP     #Behcets  #Menorrhagia  Patient with prolonged vaginal bleed, presumably from behcets. She was diagnosed several years ago and has tried apremilast. Appears colchicine has stabilized her symptoms.   - Hemoglobin trend  - Holding colchicine due to jose juan, but will resume once Cr corrects    #Recent left ankle MSSA abscess and osteomyelitis  - Pharmacy to dose vancomycin for total of 6 weeks, stop date ~ 04/27/19  - WOC consult    #GERD  - Continue PTA omeprazole    #Fibromyalgia  - Continue PTA amitriptyline    #Leukopenia  Could be from consumption vs some dilution.   - CBC in AM    #Vasovagal syncope  #Hx of pseudoseizures  Patient has had negative MRI, EEG and autoimmune demyelinating panel. Has been seen by Neuro as well and syncopal episodes appear to be vasovagal as she had positive tilt table test. Had ziopatch evaluated by cardiology and no abnormalities except for few PVC and PACs. Cardiac MR also negative. Patient had one episode today at home of \"blacking out\", but woke up within seconds without post-ictal state, loss of bladder or bowel control, eyes did not roll back or had convulsive activity which is all suggestive of vasovagal event.   - On tele     Diet: NPO  Fluids: LR at 100 mL/hr  DVT " "Prophylaxis: Pneumatic Compression Devices  Wilson Catheter: not present  Code Status: Full    Disposition Plan   Expected discharge: 2 - 3 days, recommended to prior living arrangement once work up for sigmoid stricture done..  Entered: Duane Larry 03/30/2019, 4:10 PM       The patient's care was discussed with the Attending Physician, Dr. Alvarado.    Duane Larry  Medical Student  Maroon 1 Service  [facility], [place]  Pager: [phone]  Please see sticky note for cross cover information  ______________________________________________________________________    Chief Complaint   Abdominal pain    History is obtained from the patient and chart review.    History of Present Illness   Samara Oropeza is a 23 year old female with a PMH of gastroparesis, severe constipation on miralax, senna, linaclotide, lactulose, and dicyclomine, and MSSA osteomyelitis of the left foot on oral clindamycin (now discontinued) and vancomycin through PICC who presents with a 7 day history of generalized abdominal pain worst in the periumbilical region. Beginning 22 days ago, Samara did not experience a bowel movement for 13 days. On the 13th day she experienced several loose stools and yellow burning emesis. She did not have a bowel movement for the subsequent 6 days despite using miralax, senna, and magnesium citrate daily. She then experienced 4-5 stools that were dark brown with no bright red blood. She has not had an additional bowel movement in the past 2 days. She did experience additional yellow burning emesis yesterday. Samara states that she has had very limited PO intake throughout the past 22 days.    With regards to potentially associated symptoms, Samara states that she has experienced 7 days of vaginal bleeding, using 3 extra strength pads each day. Over the past week and a half, she also endorsed headache, photophobia, b/l lower back pain, presyncope, and a \"seizure-like " "episode\" in which her mother says she fell to the ground and \"was shaking all over\" for a few seconds. However, Samara notes that she was aware of her surroundings during this event and denies postictal state. Samara also notes that she has had chest pain along the distribution of her PICC line and a temperature to 100 degrees F on Tuesday.    Of note, Samara was at [facility] on three days ago for abdominal pain, dehydration, nausea, and emesis with a creatinine of 2.1 (baseline of 0.6) but with normal WBC and lactate. Her CT imaging at that time was concerning for sigmoid stricture and low grade obstruction and showed large colonic stool burden in the majority of the colon excluding the distal sigmoid and rectum. Fecalized material was present in the distal ileum. The patient was in the process of being admitted to the hospital but left AMA because no private rooms were available.      Review of Systems    The 10 point Review of Systems is negative other than noted in the HPI or here.    Past Medical History    Past Medical History:   Diagnosis Date     Anemia      Anxiety      Arthritis      Behcet's disease (H)      Cervical adenitis May 2010     Chronic abdominal pain      Constipation, chronic 1994     Fibromyalgia      Gastro-oesophageal reflux disease      Gastroparesis      Irregular heart beat     tachycardia, has had workup     Migraines      Neuromuscular disorder (H)     fibramyalgia     Palpitations      Seizure (H)      Seizures (H)     unknown etiology     Syncope      Tourette's        Past Surgical History   Past Surgical History:   Procedure Laterality Date     ARTHROSCOPY ANKLE, REPAIR LIGAMENT Left 1/2/2019    Procedure: Ankle arthroscopy and sinus tarsi evacuation, ligament repair, left lower extremity;  Surgeon: Andres Johnson DPM;  Location:  OR     ARTHROSCOPY KNEE WITH PATELLAR REALIGNMENT  7/25/2013    Procedure: ARTHROSCOPY KNEE WITH PATELLAR REALIGNMENT;  Left Knee " Arthroscopy, Medial Patellofemoral Ligament Reconstruction with Allograft  ;  Surgeon: Jennifer Acevedo MD;  Location: [facility]     COLONOSCOPY  2015     DENTAL SURGERY  1996    Teeth removal     ENDOSCOPY UPPER, COLONOSCOPY, COMBINED  2005     HC ESOPH/GAS REFLUX TEST W NASAL IMPED >1 HR N/A 2/15/2017    Procedure: ESOPHAGEAL IMPEDENCE FUNCTION TEST WITH 24 HOUR PH GREATER THAN 1 HOUR;  Surgeon: Timothy Matta MD;  Location: [facility] GI     IR PICC PLACEMENT > 5 YRS OF AGE  3/13/2019     IRRIGATION AND DEBRIDEMENT FOOT, COMBINED Left 3/12/2019    Procedure: COMBINED IRRIGATION AND DEBRIDEMENT LEFT ANKLE;  Surgeon: Micha Glover MD;  Location: [facility] OR       Social History   -Never cigarette smoker  -Endorses rare alcohol and marijuana use  -Sexual Hx: G0 with no hx of STIs, not currently sexually active, was taking combined oral contraceptive until one week ago    Family History   Family History   Problem Relation Age of Onset     Depression Mother      Neurologic Disorder Mother         Migraines, take imitrex injection.  Also in maternal grandmother.       Alcohol/Drug Father      Hypertension Father      Depression Father      Osteoarthritis Father      Cardiovascular Maternal Grandmother      Depression Maternal Grandmother      Hypertension Maternal Grandmother      Alzheimer Disease Maternal Grandmother      Cardiovascular Maternal Grandfather      Hypertension Maternal Grandfather      Depression Maternal Grandfather      Alcohol/Drug Maternal Grandfather      Cardiovascular Paternal Grandmother      Hypertension Paternal Grandmother      Cardiovascular Paternal Grandfather      Hypertension Paternal Grandfather      Glaucoma No family hx of      Macular Degeneration No family hx of        Prior to Admission Medications   Prior to Admission Medications   Prescriptions Last Dose Informant Patient Reported? Taking?   EPINEPHrine (EPIPEN 2-EAMON) 0.3 MG/0.3ML injection   No No   Sig: Inject 0.3 mLs  (0.3 mg) into the muscle as needed for anaphylaxis   Magic Mouthwash (FV std formula) lidocaine visc 2% 2.5mL/5mL & maalox/mylanta w/ simeth 2.5mL/5mL & diphenhydrAMINE 5mg/5mL   No No   Sig: Swish and swallow 10 mLs in mouth every 6 hours as needed for mouth sores   OnabotulinumtoxinA (BOTOX IJ)   Yes No   Sig: Inject 165 Units into the muscle once Lot /C3  Exp 2020   OnabotulinumtoxinA (BOTOX IJ)   Yes No   Sig: Inject 165 Units as directed once Lot#: /C3  Exp: 10/2020   OnabotulinumtoxinA (BOTOX IJ)   Yes No   Sig: Inject 165 Units as directed once Lot # /C3   Exp:  10/2020   OnabotulinumtoxinA (BOTOX IJ)   Yes No   Sig: Inject 175 Units into the muscle once Lot # /C3 with Expiration Date:  2020   OnabotulinumtoxinA (BOTOX IJ)   Yes No   Sig: Inject 175 Units into the muscle Lot: [id number]  Exp: 2021   albuterol (PROAIR HFA/PROVENTIL HFA/VENTOLIN HFA) 108 (90 BASE) MCG/ACT Inhaler   No No   Sig: Inhale 2 puffs into the lungs every 6 hours as needed for shortness of breath / dyspnea or wheezing   amitriptyline (ELAVIL) 25 MG tablet   No No   Sig: Take 1 tablet (25 mg) by mouth At Bedtime   artificial tears OINT ophthalmic ointment   No No   Si.5 inch strip each eye at night   aspirin (ASPIRIN CHILDRENS) 81 MG chewable tablet   Yes No   Sig: Take 81 mg by mouth as needed    benzocaine (TOPICALE XTRA) 20 % GEL   No No   Sig: Apply as needed locally to mouth or nasal ulcers for pain; 4 times daily as needed   betamethasone valerate (VALISONE) 0.1 % cream   Yes No   Sig: Apply topically 2 times daily   botulinum toxin type A (BOTOX) 100 UNITS injection   No No   Sig: Inject 200 Units into the muscle every 3 months   clobetasol (TEMOVATE) 0.05 % cream   No No   Sig: Apply topically 2 times daily   colchicine (COLCYRS) 0.6 MG tablet   No No   Sig: Take 0.6 mg in AM and 0.6mg in PM every day   cyproheptadine (PERIACTIN) 4 MG tablet   No No   Sig: Take 1 tablet (4 mg) by mouth At Bedtime For  nightmares   dicyclomine (BENTYL) 20 MG tablet   No No   Sig: Take 1 tablet (20 mg) by mouth 4 times daily as needed   diphenhydrAMINE (BENADRYL) 25 MG tablet   No No   Sig: Take 1 tablet (25 mg) by mouth 3 times daily as needed (itching)   fluconazole (DIFLUCAN) 150 MG tablet   No No   Sig: Take 1 tablet (150 mg) by mouth once for 1 dose   gabapentin (NEURONTIN) 100 MG capsule   No No   Sig: Take 1 capsule (100 mg) by mouth 3 times daily as needed (anxiety)   guanFACINE (TENEX) 1 MG tablet   No No   Sig: Take 3 tablets (3 mg) by mouth twice daily in the morning and evening.   hydrocortisone 1 % CREA cream   No No   Sig: Place rectally 2 times daily as needed for itching   hyoscyamine (ANASPAZ/LEVSIN) 0.125 MG tablet   No No   Sig: Take 1-2 tablets (125-250 mcg) by mouth every 4 hours as needed for cramping   hypromellose (GENTEAL) 0.3 % SOLN   Yes No   Si drop every hour as needed for dry eyes    ibuprofen (ADVIL/MOTRIN) 600 MG tablet   No No   Sig: Take 600mg of ibuprofen every 6 hours x 5 days to assist in pain/inflammation control.  If you are not tolerating the medication, you are ok to stop.   lactulose 20 GM/30ML SOLN   No No   Sig: Take 30 mLs by mouth 3 times daily as needed (for constipation)   linaclotide (LINZESS) 145 MCG capsule   No No   Sig: Take 1 capsule (145 mcg) by mouth every morning (before breakfast)   nitroFURantoin macrocrystal-monohydrate (MACROBID) 100 MG capsule   No No   Sig: Take 1 capsule (100 mg) by mouth At Bedtime   norgestimate-ethinyl estradiol (ORTHO-CYCLEN/SPRINTEC) 0.25-35 MG-MCG tablet   No No   Sig: Take 1 tablet by mouth daily   omeprazole (PRILOSEC) 40 MG capsule   No No   Sig: Take 1 capsule (40 mg) by mouth daily   ondansetron (ZOFRAN) 8 MG tablet   No No   Sig: Take 1 tablet (8 mg) by mouth every 8 hours as needed for nausea   order for DME   No No   Sig: Equipment being ordered: adult pair of crutches   order for DME   No No   Sig: Equipment being ordered: RollAbout  knee scooter   order for DME   No No   Sig: Equipment being ordered: size 8 1/2 walking boot tall   order for DME   No No   Sig: Roll-A-Bout Walker. Patient can use for 3 months   oxyCODONE (ROXICODONE) 5 MG tablet   No No   Sig: Take 1-2 tabs every 4 hours as needed for foot/ankle pain for 2 days, then 1 tab every 4 hours as needed for 2 days, then 1 tab every 6 hours as needed for 2 days, then 1 tab every 8 hours as needed for 2 days, then 1 tab twice daily as need edfor 2 days, then discontinue   polyethylene glycol (MIRALAX/GLYCOLAX) powder   Yes No   Sig: Take 1 capful by mouth 3 times daily   sennosides (SENOKOT) 8.6 MG tablet   No No   Sig: Take 3 tablets by mouth 2 times daily   sucralfate (CARAFATE) 1 GM/10ML suspension   No No   Sig: Take 10 mLs (1 g) by mouth 4 times daily   tiZANidine (ZANAFLEX) 4 MG capsule   No No   Sig: Take 1 capsule (4 mg) by mouth 3 times daily   vancomycin 1,500 mg 3/29/2019 at 1500  No Yes   Sig: Inject 1,500 mg into the vein every 8 hours      Facility-Administered Medications: None     Allergies   Allergies   Allergen Reactions     Amoxil [Penicillins] Rash     Dad unsure of reaction.     Bee Venom Anaphylaxis     Contrast Dye Rash     Contrast Media Ready-Box [facility], 04/09/2014.; Contrast Media Ready-Box [facility], 04/09/2014.  NOTE: this is a contrast media oral with iodine. Premedicate with methylpred standard for IV contrast, request barium contrast for oral contrast.     Orange Fruit [Citrus] Anaphylaxis     Pineapple Anaphylaxis, Difficulty breathing and Rash     Reglan [Metoclopramide] Other (See Comments)     IV dose only, in ER, rapid heart rate.     Ace Inhibitors      Difficulty in breathing and GI upset     Amitiza [Lubiprostone] Nausea and Vomiting     Amoxicillin-Pot Clavulanate      Midazolam Unknown     Other reaction(s): Unknown  parent states that when pt takes this medication, she wakes up being very violent .  parent states that when pt takes this medication, she  "wakes up being very violent .     No Clinical Screening - See Comments      Bleech/ chest tightness, itchy throat, swollen tongue, hives     Tizanidine Other (See Comments)     Confusion, back pain, photophobia, abdominal pain, shaking, anxious       Versed      Coming out of pelvic exam at age of 5, was kicking and screaming when coming out of the versed.     Adhesive Tape Rash     Azithromycin Hives and Rash     Cephalexin Itching and Rash     Itchy mouth  Itchy mouth     Keflex [Cephalexin-Fd&C Yellow #6] Hives       Physical Exam   Vital Signs: Temp: 97.7  F (36.5  C) Temp src: Oral BP: (!) 144/104 Pulse: 97   Resp: 16 SpO2: 99 % O2 Device: None (Room air)    Weight: 150 lbs 0 oz    BP (!) 144/104   Pulse 97   Temp 97.7  F (36.5  C) (Oral)   Resp 16   Ht 1.6 m (5' 3\")   Wt 68 kg (150 lb)   LMP 03/09/2019   SpO2 99%   BMI 26.57 kg/m    Wt Readings from Last 2 Encounters:   03/30/19 68 kg (150 lb)   03/27/19 68 kg (150 lb)     No intake or output data in the 24 hours ending 03/30/19 1633    General: female in NAD  HEENT: No scleral icterus. NCAT. PERRL, EOMI. No JVD. No LAD. No oropharyngeal exudate or erythema.  Cardiac: RRR. No m/r/g. Normal S1, S2.  Pulm: CTAB, no wheezes, or crackles. Normal respiratory effort on RA.  Abd: Soft, non distended, No guarding or rebound. No hepatosplenomegaly. Epigastrium moderately TTP. LUQ, RUQ, LLQ, RLQ NTTP.  Skin: No jaundice or rashes observed. Lesion on left forearm which appears to be from previous unsuccessful IV placement. PICC line present.  Extremities: No LE edema  MSK: Moving all limbs symmetrically. No joint inflammation noted. Left foot is in a boot.  Neuro: A&Ox3, no focal deficits, strength 5/5 throughout, CN II-XII grossly intact.  Psych: euthymic mood    Data   Data reviewed today: I reviewed all medications, new labs and imaging results over the last 24 hours.    CBC  Recent Labs   Lab 03/30/19  1354 03/28/19  1050 03/27/19  1931 03/27/19  1200   WBC " "3.8* 5.2 4.7 6.3   RBC 4.71 4.12 3.91 3.87   HGB 13.8 12.0 11.3* 11.2*   HCT 41.7 36.3 35.6 34.1*   MCV 89 88 91 88   MCH 29.3 29.1 28.9 28.9   MCHC 33.1 33.1 31.7 32.8   RDW 12.2 12.8 12.8 12.9    319 273 255       BMP  Recent Labs   Lab 03/30/19  1354 03/29/19  0920 03/28/19  1050 03/27/19  1931     --   --  136   POTASSIUM 4.3  --   --  5.2   CHLORIDE 109  --   --  107   CO2 20  --   --  21   ANIONGAP 11  --   --  8   GLC 80  --   --  153*   BUN 15 17 18 19   CR 1.87* 1.94* 2.00* 2.13*   GFRESTIMATED 37* 35* 34* 31*   GFRESTBLACK 43* 41* 39* 36*   SHANKAR 8.9  --   --  8.5       Liver panel  Recent Labs   Lab 03/30/19  1354 03/27/19  1931   PROTTOTAL 7.6 6.5*   ALBUMIN 3.4 2.8*   BILITOTAL 0.2 0.4   ALKPHOS 126 93   AST 57* 39   ALT 46 24       Urinalysis  Lab Test 03/30/19  1340   COLOR Yellow   APPEARANCE Clear   URINEGLC Negative   URINEBILI Negative   URINEKETONE 5*   SG 1.013   UBLD Negative   URINEPH 5.0   PROTEIN 10*   UROBILINOGEN  --    NITRITE Negative   LEUKEST Negative   RBCU 1   WBCU 5       Other  Lactate 3/30: 1.1  Qualitative urine bHCG 3/27: neg    Imaging/diagnostic results:  AXR 3/30: \"Moderate fecal load throughout the colon and rectum.  Nonobstructive bowel gas pattern. No intra-abdominal free air.\"    AbCT 3/27: \"There is a large colonic stool burden. This causes distention of the  majority of the colon, with the exception of the distal sigmoid and  rectum, as well as dilatation of the distal ileum which contains  fecalized material. Findings concerning for sigmoid stricture and low  grade obstruction.\"  "

## 2019-03-30 NOTE — ED TRIAGE NOTES
Pt brought in by [facility] with reports of abdominal pain for 1 week with nausea for the past couple days. Hx of bone infection in ankle and receiving IV Vanco through PICC line. Unable to keep much down. Also reports she was seen here 2 days ago and left AMA since she was unable to get a private room.

## 2019-03-30 NOTE — LETTER
Transition Communication Hand-off for Care Transitions to Next Level of Care Provider    Name: Samara Oropeza  : 1995  MRN #: [id number]  Primary Care Provider: Sonja Abreu     Primary Clinic: [address] [address]     Reason for Hospitalization:  Acute kidney injury (H) [N17.9]  Nausea and vomiting, intractability of vomiting not specified, unspecified vomiting type [R11.2]  Admit Date/Time: 3/30/2019 11:54 AM  Discharge Date: 19  Payor Source: Payor: MEDICA / Plan: MEDICA CHOICE / Product Type: Indemnity /     Reason for Communication Hand-off Referral: continuation of care    Discharge Plan: home and will continue to finish IV vanco course with [facility]       Concern for non-adherence with plan of care:   Y/N n  Discharge Needs Assessment:  Needs      Most Recent Value   Home Infusion Provider  [facility] [phone], Fax: [phone]          Already enrolled in Tele-monitoring program and name of program:    Follow-up specialty is recommended: Yes    Follow-up plan:    Future Appointments   Date Time Provider Department Center   2019  8:30 AM Andres Johnson DPM [facility]   4/3/2019 10:10 AM Micha Glover MD [facility]   4/3/2019  1:00 PM Rosangela Hollingsworth, [facility]   4/3/2019  2:30 PM  [facility]   4/10/2019  8:30 AM Gerda Mehta MD [facility]   4/10/2019  2:30 PM  [facility]   2019  2:30 PM Ernestina Patel [facility] [place]   2019  2:30 PM  [facility]   2019 10:00 AM [facility] [place]   2019 10:20 AM Alejandrina Hernandez MD [facility]   2019 11:00 AM Ernestina Patel [facility] [place]   2019 11:00 AM Ernestina Patel [facility] [place]   2019 10:30 AM Austen Marquez MD [facility]    10/30/2019  1:00 PM Joseph De La Torre MD [facility]       Any outstanding  tests or procedures:        Referrals     Future Labs/Procedures    Home infusion referral     Comments:    Your provider has referred you to: [facility]: [facility] [phone]   [email or web address]    Local Address (if different from home address): N/A    Anticipated Length of Therapy: per MD order    Home Infusion Pharmacist to adjust therapy based on labs and clinical assessments: Yes    Labs:  May draw labs from Venous Catheter: Yes  Home Infusion Pharmacist to order labs based on therapy type and clinical assessments: Yes  Call/Fax Lab Results to: [facility]  Appointments:   [phone]       Agency Staff to assess nursing needs for Infusion Therapy.    Access Device Management:  IV Access Type: PICC  Flush with Heparin and Normal Saline IVP PRN and routine site care (per agency protocol) to maintain access device? Yes    Medication Therapy Management Referral     Comments:    MTM referral reason            Patient has Lactulose or Rifaxamin as a PTA Med or a Discharge medication      Patient had a hospital or ED visit in last 6 months and has more than 10   PTA or Discharge medications       This service is designed to help you get the most from your medications.  A specially trained pharmacist will work closely with you and your doctors  to solve any problems related to your medications and to help you get the   best results from taking them.      The Medication Therapy Management staff will call you to schedule an appointment.            Key Recommendations:      Madeleine Ramirez    AVS/Discharge Summary is the source of truth; this is a helpful guide for improved communication of patient story

## 2019-03-30 NOTE — ED PROVIDER NOTES
History     Chief Complaint   Patient presents with     Abdominal Pain     HPI  Samara Oropeza is a 23 year old female with a history of anxiety, Tourette's syndrome, migraine, irritable bowel syndrome, trochanteric bursitis, tinnitus, aphthous ulcer, herniated lumbar disc, TAHIR, dysthymic disorder and PTSD, who presents to the Emergency Department by ambulance accompanied by her mother for evaluation of abdominal pain. Per chart review, patient was seen in the [facility] ED on 3/25/2019 for a vascular access problem; reportedly stating that her PICC line was falling out, for which she receives vancomycin for cellulitis and an abscess of her left ankle. Patient returned to the ED here three days ago on 3/27/2019 for abdominal pain, dehydration, nausea and emesis. She was noted to have markedly elevated creatinine at 2.1 from her baseline of approximately 0.6. Otherwise, she had a normal WBC and lactic acid. She had CT imaging with finding consistent with her chronic constipation as well as a possible sigmoid stricture, and it was decided that she would be admitted to the Internal Medicine service. However, patient actually left hospital while her admission was pending reportedly due to not having a private room. Patient and her mother report that she requires as private room and restrooms due to her OCD.     Today, patient states that she was doing relatively well up until today when she developed abdominal pain, nausea, emesis and a headache. She reportedly ran out of her home Zofran and states that she has had minimal oral intake in the past week. She currently mainly complains of nausea and headache.     I have reviewed the Medications, Allergies, Past Medical and Surgical History, and Social History in the Epic system.    Past Medical History:   Diagnosis Date     Anemia      Anxiety      Arthritis      Behcet's disease (H)      Cervical adenitis May 2010     Chronic abdominal pain      Constipation,  chronic 1994     Fibromyalgia      Gastro-oesophageal reflux disease      Gastroparesis      Irregular heart beat     tachycardia, has had workup     Migraines      Neuromuscular disorder (H)     fibramyalgia     Palpitations      Seizure (H)      Seizures (H)     unknown etiology     Syncope      Tourette's        Past Surgical History:   Procedure Laterality Date     ARTHROSCOPY ANKLE, REPAIR LIGAMENT Left 1/2/2019    Procedure: Ankle arthroscopy and sinus tarsi evacuation, ligament repair, left lower extremity;  Surgeon: Andres Johnson DPM;  Location: [facility] OR     ARTHROSCOPY KNEE WITH PATELLAR REALIGNMENT  7/25/2013    Procedure: ARTHROSCOPY KNEE WITH PATELLAR REALIGNMENT;  Left Knee Arthroscopy, Medial Patellofemoral Ligament Reconstruction with Allograft  ;  Surgeon: Jennifer Acevedo MD;  Location: [facility]     COLONOSCOPY  2015     DENTAL SURGERY  1996    Teeth removal     ENDOSCOPY UPPER, COLONOSCOPY, COMBINED  2005     HC ESOPH/GAS REFLUX TEST W NASAL IMPED >1 HR N/A 2/15/2017    Procedure: ESOPHAGEAL IMPEDENCE FUNCTION TEST WITH 24 HOUR PH GREATER THAN 1 HOUR;  Surgeon: Timothy Matta MD;  Location: [facility] GI     IR PICC PLACEMENT > 5 YRS OF AGE  3/13/2019     IRRIGATION AND DEBRIDEMENT FOOT, COMBINED Left 3/12/2019    Procedure: COMBINED IRRIGATION AND DEBRIDEMENT LEFT ANKLE;  Surgeon: Micha Glover MD;  Location: [facility] OR       Family History   Problem Relation Age of Onset     Depression Mother      Neurologic Disorder Mother         Migraines, take imitrex injection.  Also in maternal grandmother.       Alcohol/Drug Father      Hypertension Father      Depression Father      Osteoarthritis Father      Cardiovascular Maternal Grandmother      Depression Maternal Grandmother      Hypertension Maternal Grandmother      Alzheimer Disease Maternal Grandmother      Cardiovascular Maternal Grandfather      Hypertension Maternal Grandfather      Depression Maternal Grandfather       Alcohol/Drug Maternal Grandfather      Cardiovascular Paternal Grandmother      Hypertension Paternal Grandmother      Cardiovascular Paternal Grandfather      Hypertension Paternal Grandfather      Glaucoma No family hx of      Macular Degeneration No family hx of        Social History     Tobacco Use     Smoking status: Never Smoker     Smokeless tobacco: Never Used   Substance Use Topics     Alcohol use: Yes     Alcohol/week: 0.6 oz     Types: 1 Standard drinks or equivalent per week     Comment: rarely     Current Facility-Administered Medications   Medication     ondansetron (ZOFRAN) injection 4 mg     sodium chloride 0.9% infusion     Current Outpatient Medications   Medication     vancomycin 1,500 mg     albuterol (PROAIR HFA/PROVENTIL HFA/VENTOLIN HFA) 108 (90 BASE) MCG/ACT Inhaler     amitriptyline (ELAVIL) 25 MG tablet     artificial tears OINT ophthalmic ointment     aspirin (ASPIRIN CHILDRENS) 81 MG chewable tablet     benzocaine (TOPICALE XTRA) 20 % GEL     betamethasone valerate (VALISONE) 0.1 % cream     botulinum toxin type A (BOTOX) 100 UNITS injection     clobetasol (TEMOVATE) 0.05 % cream     colchicine (COLCYRS) 0.6 MG tablet     cyproheptadine (PERIACTIN) 4 MG tablet     dicyclomine (BENTYL) 20 MG tablet     diphenhydrAMINE (BENADRYL) 25 MG tablet     EPINEPHrine (EPIPEN 2-EAMON) 0.3 MG/0.3ML injection     gabapentin (NEURONTIN) 100 MG capsule     guanFACINE (TENEX) 1 MG tablet     hydrocortisone 1 % CREA cream     hyoscyamine (ANASPAZ/LEVSIN) 0.125 MG tablet     hypromellose (GENTEAL) 0.3 % SOLN     ibuprofen (ADVIL/MOTRIN) 600 MG tablet     lactulose 20 GM/30ML SOLN     linaclotide (LINZESS) 145 MCG capsule     Magic Mouthwash (FV std formula) lidocaine visc 2% 2.5mL/5mL & maalox/mylanta w/ simeth 2.5mL/5mL & diphenhydrAMINE 5mg/5mL     nitroFURantoin macrocrystal-monohydrate (MACROBID) 100 MG capsule     norgestimate-ethinyl estradiol (ORTHO-CYCLEN/SPRINTEC) 0.25-35 MG-MCG tablet     omeprazole  (PRILOSEC) 40 MG capsule     OnabotulinumtoxinA (BOTOX IJ)     OnabotulinumtoxinA (BOTOX IJ)     OnabotulinumtoxinA (BOTOX IJ)     OnabotulinumtoxinA (BOTOX IJ)     OnabotulinumtoxinA (BOTOX IJ)     ondansetron (ZOFRAN) 8 MG tablet     order for DME     order for DME     order for DME     order for DME     oxyCODONE (ROXICODONE) 5 MG tablet     polyethylene glycol (MIRALAX/GLYCOLAX) powder     sennosides (SENOKOT) 8.6 MG tablet     sucralfate (CARAFATE) 1 GM/10ML suspension     tiZANidine (ZANAFLEX) 4 MG capsule     Facility-Administered Medications Ordered in Other Encounters   Medication     alteplase (CATHFLO ACTIVASE) injection 2 mg        Allergies   Allergen Reactions     Amoxil [Penicillins] Rash     Dad unsure of reaction.     Bee Venom Anaphylaxis     Contrast Dye Rash     Contrast Media Ready-Box [facility] [place], 04/09/2014.; Contrast [employer]-[facility] [place], 04/09/2014.  NOTE: this is a contrast media oral with iodine. Premedicate with methylpred standard for IV contrast, request barium contrast for oral contrast.     Orange Fruit [Citrus] Anaphylaxis     Pineapple Anaphylaxis, Difficulty breathing and Rash     Reglan [Metoclopramide] Other (See Comments)     IV dose only, in ER, rapid heart rate.     Ace Inhibitors      Difficulty in breathing and GI upset     Amitiza [Lubiprostone] Nausea and Vomiting     Amoxicillin-Pot Clavulanate      Midazolam Unknown     Other reaction(s): Unknown  parent states that when pt takes this medication, she wakes up being very violent .  parent states that when pt takes this medication, she wakes up being very violent .     No Clinical Screening - See Comments      Bleech/ chest tightness, itchy throat, swollen tongue, hives     Tizanidine Other (See Comments)     Confusion, back pain, photophobia, abdominal pain, shaking, anxious       Versed      Coming out of pelvic exam at age of 5, was kicking and screaming when coming out of the versed.     Adhesive Tape Rash     Azithromycin  "Hives and Rash     Cephalexin Itching and Rash     Itchy mouth  Itchy mouth     Keflex [Cephalexin-Fd&C Yellow #6] Hives       Review of Systems   Gastrointestinal: Positive for abdominal pain, nausea and vomiting.   Neurological: Positive for headaches.   All other systems reviewed and are negative.      Physical Exam   BP: 122/84  Pulse: 111  Temp: 98.1  F (36.7  C)  Resp: 16  Height: 160 cm (5' 3\")  Weight: 68 kg (150 lb)  SpO2: 97 %      Physical Exam   Constitutional: No distress.   HENT:   Head: Atraumatic.   Mouth/Throat: Oropharynx is clear and moist. No oropharyngeal exudate.   Eyes: EOM are normal. No scleral icterus.   Neck: Neck supple.   Cardiovascular: Normal rate, regular rhythm and normal heart sounds.   Pulmonary/Chest: Breath sounds normal. No respiratory distress.   Abdominal: Soft.   Mild diffuse abdominal tenderness   Musculoskeletal: She exhibits no edema or deformity.   Neurological: She is alert.   Skin: Skin is warm and dry. She is not diaphoretic.   Psychiatric: She has a normal mood and affect. Her behavior is normal.       ED Course        Procedures           Labs Ordered and Resulted from Time of ED Arrival Up to the Time of Departure from the ED   CBC WITH PLATELETS DIFFERENTIAL - Abnormal; Notable for the following components:       Result Value    WBC 3.8 (*)     All other components within normal limits   COMPREHENSIVE METABOLIC PANEL - Abnormal; Notable for the following components:    Creatinine 1.87 (*)     GFR Estimate 37 (*)     GFR Estimate If Black 43 (*)     AST 57 (*)     All other components within normal limits   UA MACROSCOPIC WITH REFLEX TO MICRO AND CULTURE - Abnormal; Notable for the following components:    Ketones Urine 5 (*)     Protein Albumin Urine 10 (*)     Mucous Urine Present (*)     Granular Casts 1 (*)     All other components within normal limits   LACTIC ACID WHOLE BLOOD   PERIPHERAL IV CATHETER           Results for orders placed or performed during the " hospital encounter of 03/30/19   XR Abdomen 2 Views    Narrative    XR ABDOMEN 2 VW 3/30/2019 1:28 PM    History: abd pain, nausea, hx severe constipation ? obstruction    Comparison: 3/27/2018    Findings: Moderate fecal load throughout the colon and rectum.  Nonobstructive bowel gas pattern. No intra-abdominal free air. No  suspicious calcification. Bones are unremarkable.      Impression    Impression: Moderate fecal load with nonobstructive bowel gas pattern.    DAGMAR MAI   CBC with platelets differential   Result Value Ref Range    WBC 3.8 (L) 4.0 - 11.0 10e9/L    RBC Count 4.71 3.8 - 5.2 10e12/L    Hemoglobin 13.8 11.7 - 15.7 g/dL    Hematocrit 41.7 35.0 - 47.0 %    MCV 89 78 - 100 fl    MCH 29.3 26.5 - 33.0 pg    MCHC 33.1 31.5 - 36.5 g/dL    RDW 12.2 10.0 - 15.0 %    Platelet Count 303 150 - 450 10e9/L    Diff Method Automated Method     % Neutrophils 50.6 %    % Lymphocytes 33.0 %    % Monocytes 10.0 %    % Eosinophils 4.5 %    % Basophils 1.1 %    % Immature Granulocytes 0.8 %    Nucleated RBCs 0 0 /100    Absolute Neutrophil 1.9 1.6 - 8.3 10e9/L    Absolute Lymphocytes 1.3 0.8 - 5.3 10e9/L    Absolute Monocytes 0.4 0.0 - 1.3 10e9/L    Absolute Eosinophils 0.2 0.0 - 0.7 10e9/L    Absolute Basophils 0.0 0.0 - 0.2 10e9/L    Abs Immature Granulocytes 0.0 0 - 0.4 10e9/L    Absolute Nucleated RBC 0.0    Comprehensive metabolic panel   Result Value Ref Range    Sodium 140 133 - 144 mmol/L    Potassium 4.3 3.4 - 5.3 mmol/L    Chloride 109 94 - 109 mmol/L    Carbon Dioxide 20 20 - 32 mmol/L    Anion Gap 11 3 - 14 mmol/L    Glucose 80 70 - 99 mg/dL    Urea Nitrogen 15 7 - 30 mg/dL    Creatinine 1.87 (H) 0.52 - 1.04 mg/dL    GFR Estimate 37 (L) >60 mL/min/[1.73_m2]    GFR Estimate If Black 43 (L) >60 mL/min/[1.73_m2]    Calcium 8.9 8.5 - 10.1 mg/dL    Bilirubin Total 0.2 0.2 - 1.3 mg/dL    Albumin 3.4 3.4 - 5.0 g/dL    Protein Total 7.6 6.8 - 8.8 g/dL    Alkaline Phosphatase 126 40 - 150 U/L    ALT 46 0 - 50  U/L    AST 57 (H) 0 - 45 U/L   Lactic acid whole blood   Result Value Ref Range    Lactic Acid 1.1 0.7 - 2.0 mmol/L   UA reflex to Microscopic and Culture   Result Value Ref Range    Color Urine Yellow     Appearance Urine Clear     Glucose Urine Negative NEG^Negative mg/dL    Bilirubin Urine Negative NEG^Negative    Ketones Urine 5 (A) NEG^Negative mg/dL    Specific Gravity Urine 1.013 1.003 - 1.035    Blood Urine Negative NEG^Negative    pH Urine 5.0 5.0 - 7.0 pH    Protein Albumin Urine 10 (A) NEG^Negative mg/dL    Urobilinogen mg/dL Normal 0.0 - 2.0 mg/dL    Nitrite Urine Negative NEG^Negative    Leukocyte Esterase Urine Negative NEG^Negative    Source Midstream Urine     RBC Urine 1 0 - 2 /HPF    WBC Urine 5 0 - 5 /HPF    Squamous Epithelial /HPF Urine 1 0 - 1 /HPF    Mucous Urine Present (A) NEG^Negative /LPF    Hyaline Casts 2 0 - 2 /LPF    Granular Casts 1 (A) NEG^Negative /LPF     *Note: Due to a large number of results and/or encounters for the requested time period, some results have not been displayed. A complete set of results can be found in Results Review.         Assessments & Plan (with Medical Decision Making)   23-year-old female presents to us with a chief complaint of nausea vomiting abdominal discomfort.  Differential includes but not limited to bowel obstruction, dehydration, kidney failure, UTI.  Patient's creatinine on 321 was 0.68.  It is somewhat improved from her previous measurements but it is currently still elevated at 1.87.  We will admit her for continued acute renal insufficiency.  The symptoms are likely secondary to her severe chronic constipation.  Patient states she does have a bowel movement daily but previous scan she had severe constipation with a possible colonic stricture.  At previous ER visit the colorectal recommended colonoscopy by GI to assess the degree of the stricture.  No infectious process was visualized on the CT scan 3 days ago.  She is afebrile with a normal  white count at this point.  Lactic acid is normal as well.  UTI.  Discussed patient's care with Dr. Ravi internal medicine triage who accepted admission    I have reviewed the nursing notes.    I have reviewed the findings, diagnosis, plan and need for follow up with the patient.       Medication List      ASK your doctor about these medications    fluconazole 150 MG tablet  Commonly known as:  DIFLUCAN  150 mg, Oral, ONCE  Ask about: Should I take this medication?            Final diagnoses:   Acute kidney injury (H)   Nausea and vomiting, intractability of vomiting not specified, unspecified vomiting type     I, Annia Aguillon, am serving as a trained medical scribe to document services personally performed by Samir Ding DO, based on the provider's statements to me.   I, Samir Ding DO, was physically present and have reviewed and verified the accuracy of this note documented by Annia Aguillon.    3/30/2019   [facility], [place], EMERGENCY DEPARTMENT     Samir Ding DO  03/30/19 1526

## 2019-03-31 LAB
ANION GAP SERPL CALCULATED.3IONS-SCNC: 9 MMOL/L (ref 3–14)
BUN SERPL-MCNC: 12 MG/DL (ref 7–30)
CALCIUM SERPL-MCNC: 8.2 MG/DL (ref 8.5–10.1)
CHLORIDE SERPL-SCNC: 110 MMOL/L (ref 94–109)
CO2 SERPL-SCNC: 21 MMOL/L (ref 20–32)
CREAT SERPL-MCNC: 1.62 MG/DL (ref 0.52–1.04)
CREAT UR-MCNC: 65 MG/DL
ERYTHROCYTE [DISTWIDTH] IN BLOOD BY AUTOMATED COUNT: 12 % (ref 10–15)
FRACT EXCRET NA UR+SERPL-RTO: 2.2 %
GFR SERPL CREATININE-BSD FRML MDRD: 44 ML/MIN/{1.73_M2}
GLUCOSE SERPL-MCNC: 114 MG/DL (ref 70–99)
HCT VFR BLD AUTO: 35.6 % (ref 35–47)
HGB BLD-MCNC: 11.6 G/DL (ref 11.7–15.7)
MAGNESIUM SERPL-MCNC: 1.5 MG/DL (ref 1.6–2.3)
MCH RBC QN AUTO: 28.9 PG (ref 26.5–33)
MCHC RBC AUTO-ENTMCNC: 32.6 G/DL (ref 31.5–36.5)
MCV RBC AUTO: 89 FL (ref 78–100)
PLATELET # BLD AUTO: 284 10E9/L (ref 150–450)
POTASSIUM SERPL-SCNC: 3.8 MMOL/L (ref 3.4–5.3)
RBC # BLD AUTO: 4.01 10E12/L (ref 3.8–5.2)
SODIUM SERPL-SCNC: 140 MMOL/L (ref 133–144)
SODIUM UR-SCNC: 122 MMOL/L
WBC # BLD AUTO: 3.5 10E9/L (ref 4–11)

## 2019-03-31 PROCEDURE — 80048 BASIC METABOLIC PNL TOTAL CA: CPT | Performed by: STUDENT IN AN ORGANIZED HEALTH CARE EDUCATION/TRAINING PROGRAM

## 2019-03-31 PROCEDURE — 83735 ASSAY OF MAGNESIUM: CPT | Performed by: STUDENT IN AN ORGANIZED HEALTH CARE EDUCATION/TRAINING PROGRAM

## 2019-03-31 PROCEDURE — 36415 COLL VENOUS BLD VENIPUNCTURE: CPT | Performed by: STUDENT IN AN ORGANIZED HEALTH CARE EDUCATION/TRAINING PROGRAM

## 2019-03-31 PROCEDURE — 99232 SBSQ HOSP IP/OBS MODERATE 35: CPT | Mod: GC | Performed by: INTERNAL MEDICINE

## 2019-03-31 PROCEDURE — 25000132 ZZH RX MED GY IP 250 OP 250 PS 637: Performed by: INTERNAL MEDICINE

## 2019-03-31 PROCEDURE — 82570 ASSAY OF URINE CREATININE: CPT | Performed by: STUDENT IN AN ORGANIZED HEALTH CARE EDUCATION/TRAINING PROGRAM

## 2019-03-31 PROCEDURE — 25000132 ZZH RX MED GY IP 250 OP 250 PS 637: Performed by: STUDENT IN AN ORGANIZED HEALTH CARE EDUCATION/TRAINING PROGRAM

## 2019-03-31 PROCEDURE — 85027 COMPLETE CBC AUTOMATED: CPT | Performed by: STUDENT IN AN ORGANIZED HEALTH CARE EDUCATION/TRAINING PROGRAM

## 2019-03-31 PROCEDURE — 12000001 ZZH R&B MED SURG/OB UMMC

## 2019-03-31 PROCEDURE — 25000128 H RX IP 250 OP 636: Performed by: INTERNAL MEDICINE

## 2019-03-31 PROCEDURE — 25000128 H RX IP 250 OP 636: Performed by: STUDENT IN AN ORGANIZED HEALTH CARE EDUCATION/TRAINING PROGRAM

## 2019-03-31 PROCEDURE — 84300 ASSAY OF URINE SODIUM: CPT | Performed by: STUDENT IN AN ORGANIZED HEALTH CARE EDUCATION/TRAINING PROGRAM

## 2019-03-31 PROCEDURE — 25800030 ZZH RX IP 258 OP 636: Performed by: INTERNAL MEDICINE

## 2019-03-31 PROCEDURE — 25800030 ZZH RX IP 258 OP 636: Performed by: EMERGENCY MEDICINE

## 2019-03-31 PROCEDURE — 25800030 ZZH RX IP 258 OP 636: Performed by: STUDENT IN AN ORGANIZED HEALTH CARE EDUCATION/TRAINING PROGRAM

## 2019-03-31 RX ORDER — HYOSCYAMINE SULFATE 0.125 MG
125-250 TABLET ORAL EVERY 4 HOURS PRN
Status: DISCONTINUED | OUTPATIENT
Start: 2019-03-31 | End: 2019-04-01 | Stop reason: HOSPADM

## 2019-03-31 RX ORDER — DICYCLOMINE HCL 20 MG
20 TABLET ORAL 4 TIMES DAILY PRN
Status: DISCONTINUED | OUTPATIENT
Start: 2019-03-31 | End: 2019-04-01 | Stop reason: HOSPADM

## 2019-03-31 RX ORDER — BISACODYL 5 MG
10 TABLET, DELAYED RELEASE (ENTERIC COATED) ORAL ONCE
Status: COMPLETED | OUTPATIENT
Start: 2019-03-31 | End: 2019-03-31

## 2019-03-31 RX ORDER — MAGNESIUM SULFATE HEPTAHYDRATE 40 MG/ML
2 INJECTION, SOLUTION INTRAVENOUS ONCE
Status: COMPLETED | OUTPATIENT
Start: 2019-03-31 | End: 2019-03-31

## 2019-03-31 RX ORDER — POLYETHYLENE GLYCOL 3350 17 G/17G
17 POWDER, FOR SOLUTION ORAL ONCE
Status: COMPLETED | OUTPATIENT
Start: 2019-03-31 | End: 2019-03-31

## 2019-03-31 RX ADMIN — MAGNESIUM SULFATE HEPTAHYDRATE 2 G: 40 INJECTION, SOLUTION INTRAVENOUS at 15:43

## 2019-03-31 RX ADMIN — SODIUM CHLORIDE, PRESERVATIVE FREE 1000 ML: 5 INJECTION INTRAVENOUS at 18:03

## 2019-03-31 RX ADMIN — DICYCLOMINE HYDROCHLORIDE 20 MG: 20 TABLET ORAL at 21:08

## 2019-03-31 RX ADMIN — ACETAMINOPHEN 650 MG: 325 TABLET, FILM COATED ORAL at 16:01

## 2019-03-31 RX ADMIN — POLYETHYLENE GLYCOL 3350 17 G: 17 POWDER, FOR SOLUTION ORAL at 13:51

## 2019-03-31 RX ADMIN — GUANFACINE HYDROCHLORIDE 3 MG: 1 TABLET ORAL at 09:38

## 2019-03-31 RX ADMIN — OMEPRAZOLE 40 MG: 20 CAPSULE, DELAYED RELEASE ORAL at 09:38

## 2019-03-31 RX ADMIN — Medication: at 21:08

## 2019-03-31 RX ADMIN — VANCOMYCIN HYDROCHLORIDE 1250 MG: 10 INJECTION, POWDER, LYOPHILIZED, FOR SOLUTION INTRAVENOUS at 20:03

## 2019-03-31 RX ADMIN — BISACODYL 10 MG: 5 TABLET, COATED ORAL at 13:51

## 2019-03-31 RX ADMIN — SODIUM CHLORIDE, POTASSIUM CHLORIDE, SODIUM LACTATE AND CALCIUM CHLORIDE: 600; 310; 30; 20 INJECTION, SOLUTION INTRAVENOUS at 04:39

## 2019-03-31 RX ADMIN — VANCOMYCIN HYDROCHLORIDE 1250 MG: 10 INJECTION, POWDER, LYOPHILIZED, FOR SOLUTION INTRAVENOUS at 12:33

## 2019-03-31 RX ADMIN — GUANFACINE HYDROCHLORIDE 3 MG: 1 TABLET ORAL at 20:00

## 2019-03-31 RX ADMIN — HYOSCYAMINE SULFATE 250 MCG: 0.12 TABLET ORAL at 22:28

## 2019-03-31 RX ADMIN — ACETAMINOPHEN 650 MG: 325 TABLET, FILM COATED ORAL at 06:19

## 2019-03-31 RX ADMIN — AMITRIPTYLINE HYDROCHLORIDE 25 MG: 25 TABLET, FILM COATED ORAL at 21:08

## 2019-03-31 RX ADMIN — VANCOMYCIN HYDROCHLORIDE 2000 MG: 10 INJECTION, POWDER, LYOPHILIZED, FOR SOLUTION INTRAVENOUS at 04:39

## 2019-03-31 ASSESSMENT — ACTIVITIES OF DAILY LIVING (ADL)
ADLS_ACUITY_SCORE: 16

## 2019-03-31 ASSESSMENT — MIFFLIN-ST. JEOR: SCORE: 1427.19

## 2019-03-31 NOTE — CONSULTS
GASTROENTEROLOGY CONSULTATION      Date of Admission:  3/30/2019          ASSESSMENT AND RECOMMENDATIONS:   Assessment:  23 year old female with a history of chronic constipation (use to follow in the GI clinic), gastroparesis, recent left ankle MSSA osteomyelitis admitted on 3/30/2019 with 7 day history of worsening periumbilical abdominal pain. Patient reports significant constipation for the last 22 days with some days alternating with diarrhea (likely overflow). CT done 3/27 showed concern for sigmoid stricture although after improvement of stool burden on repeat CT no stricture was noted. She has a colonoscopy that is normal in January 2018. Suspect her worsening constipation was secondary to narcotic use given recent surgery on her let ankle. Currently stool burden improved on repeat CT scan. We will recommend continue aggressive bowel regimen with miralax and lactulose. No indication for inpatient colonoscopy. Patient should follow up in the GI clinic.     Recommendations  --No indication for inpatient procedure  --continue miralax 17g TID  --Can add lactulose 20g BID if needed  --Agree with dulcolax   --Encourage hydration   --GI outpatient follow up.    Gastroenterology follow up recommendations: TBD    Thank you for involving us in this patient's care. Please do not hesitate to contact the GI service with any questions or concerns.     Pt care plan discussed with Dr. Russell, GI staff physician.    Kristi Javier  GI Fellow   -------------------------------------------------------------------------------------------------------------------          Chief Complaint:   We were asked by Dr. Aaron of Medicine to evaluate this patient with constipation     History is obtained from the patient and the medical record.          History of Present Illness:   Samara Oropeza is a 23 year old female with a history of chronic constipation (use to follow in the GI clinic), gastroparesis, recent left  ankle MSSA osteomyelitis admitted on 3/30/2019 with 7 day history of worsening periumbilical abdominal pain. Patient reports significant constipation for the last 22 days with some days alternating with diarrhea (likely overflow). CT done 3/27 showed concern for sigmoid stricture although after improvement of stool burden on repeat CT no stricture was noted. She has a colonoscopy that is normal in January 2018. Has been also taking narcotics for recent I&D of left ankle abscess. Patient denies any melena or bloody stools. She reports nausea and vomiting. Currently improving, reports large BM yesterday though. No fevers or other signs of infection.            Past Medical History:   Reviewed and edited as appropriate  Past Medical History:   Diagnosis Date     Anemia      Anxiety      Arthritis      Behcet's disease (H)      Cervical adenitis May 2010     Chronic abdominal pain      Constipation, chronic 1994     Fibromyalgia      Gastro-oesophageal reflux disease      Gastroparesis      Irregular heart beat     tachycardia, has had workup     Migraines      Neuromuscular disorder (H)     fibramyalgia     Palpitations      Seizure (H)      Seizures (H)     unknown etiology     Syncope      Tourette's             Past Surgical History:   Reviewed and edited as appropriate   Past Surgical History:   Procedure Laterality Date     ARTHROSCOPY ANKLE, REPAIR LIGAMENT Left 1/2/2019    Procedure: Ankle arthroscopy and sinus tarsi evacuation, ligament repair, left lower extremity;  Surgeon: Andres Johnson DPM;  Location:  OR     ARTHROSCOPY KNEE WITH PATELLAR REALIGNMENT  7/25/2013    Procedure: ARTHROSCOPY KNEE WITH PATELLAR REALIGNMENT;  Left Knee Arthroscopy, Medial Patellofemoral Ligament Reconstruction with Allograft  ;  Surgeon: Jennifer Acevedo MD;  Location: [facility]     COLONOSCOPY  2015     DENTAL SURGERY  1996    Teeth removal     ENDOSCOPY UPPER, COLONOSCOPY, COMBINED  2005      ESOPH/GAS REFLUX TEST W  NASAL IMPED >1 HR N/A 2/15/2017    Procedure: ESOPHAGEAL IMPEDENCE FUNCTION TEST WITH 24 HOUR PH GREATER THAN 1 HOUR;  Surgeon: Timothy Matta MD;  Location: [facility]     IR PICC PLACEMENT > 5 YRS OF AGE  3/13/2019     IRRIGATION AND DEBRIDEMENT FOOT, COMBINED Left 3/12/2019    Procedure: COMBINED IRRIGATION AND DEBRIDEMENT LEFT ANKLE;  Surgeon: Micha Glover MD;  Location: [facility]            Previous Endoscopy:   No results found. However, due to the size of the patient record, not all encounters were searched. Please check Results Review for a complete set of results.         Social History:   Reviewed and edited as appropriate  Social History     Socioeconomic History     Marital status: Single     Spouse name: Not on file     Number of children: Not on file     Years of education: Not on file     Highest education level: Not on file   Occupational History     Not on file   Social Needs     Financial resource strain: Not on file     Food insecurity:     Worry: Not on file     Inability: Not on file     Transportation needs:     Medical: Not on file     Non-medical: Not on file   Tobacco Use     Smoking status: Never Smoker     Smokeless tobacco: Never Used   Substance and Sexual Activity     Alcohol use: Yes     Alcohol/week: 0.6 oz     Types: 1 Standard drinks or equivalent per week     Comment: rarely     Drug use: No     Types: Marijuana     Comment: occassional marijuana only, denies others     Sexual activity: Not Currently     Partners: Male     Birth control/protection: Pill   Lifestyle     Physical activity:     Days per week: Not on file     Minutes per session: Not on file     Stress: Not on file   Relationships     Social connections:     Talks on phone: Not on file     Gets together: Not on file     Attends [employer] service: Not on file     Active member of club or organization: Not on file     Attends meetings of clubs or organizations: Not on file     Relationship status: Not on  "file     Intimate partner violence:     Fear of current or ex partner: Not on file     Emotionally abused: Not on file     Physically abused: Not on file     Forced sexual activity: Not on file   Other Topics Concern     Parent/sibling w/ CABG, MI or angioplasty before 65F 55M? Not Asked   Social History Narrative    Boyfriend of 5 years, living with \"in laws\" Oct 2017 and looking forward to their own apartment.             Family History:   Reviewed and edited as appropriate  No known history of gastrointestinal/liver disease or  gastrointestinal malignancies       Allergies:   Reviewed and edited as appropriate     Allergies   Allergen Reactions     Amoxil [Penicillins] Rash     Dad unsure of reaction.     Bee Venom Anaphylaxis     Contrast Dye Rash     Contrast Media Ready-[facility], 04/09/2014.; Cont[facility], [facility]09[facility].  NOTE: this is a contrast media oral with iodine. Premedicate with methylpred standard for IV contrast, request barium contrast for oral contrast.     Orange Fruit [Citrus] Anaphylaxis     Pineapple Anaphylaxis, Difficulty breathing and Rash     Reglan [Metoclopramide] Other (See Comments)     IV dose only, in ER, rapid heart rate.     Ace Inhibitors      Difficulty in breathing and GI upset     Amitiza [Lubiprostone] Nausea and Vomiting     Amoxicillin-Pot Clavulanate      Midazolam Unknown     Other reaction(s): Unknown  parent states that when pt takes this medication, she wakes up being very violent .  parent states that when pt takes this medication, she wakes up being very violent .     No Clinical Screening - See Comments      Bleech/ chest tightness, itchy throat, swollen tongue, hives     Tizanidine Other (See Comments)     Confusion, back pain, photophobia, abdominal pain, shaking, anxious       Versed      Coming out of pelvic exam at age of 5, was kicking and screaming when coming out of the versed.     Adhesive Tape Rash     Azithromycin Hives and Rash     Cephalexin " "Itching and Rash     Itchy mouth  Itchy mouth     Keflex [Cephalexin-Fd&C Yellow #6] Hives            Medications:     Current Facility-Administered Medications   Medication     acetaminophen (TYLENOL) tablet 650 mg     albuterol (PROAIR HFA/PROVENTIL HFA/VENTOLIN HFA) 108 (90 Base) MCG/ACT inhaler 2 puff     amitriptyline (ELAVIL) tablet 25 mg     artificial tears ophthalmic ointment     betamethasone valerate (VALISONE) 0.1 % cream     clobetasol (TEMOVATE) 0.05 % cream     guanFACINE (TENEX) tablet 3 mg     magic mouthwash suspension (diphenhydrAMINE, lidocaine, aluminum-magnesium & simethicone)     melatonin tablet 1 mg     naloxone (NARCAN) injection 0.1-0.4 mg     omeprazole (priLOSEC) CR capsule 40 mg     ondansetron (ZOFRAN) injection 4 mg     sodium chloride 0.9% infusion     vancomycin 1250 mg in 0.9% NaCl 250 mL intermittent infusion 1,250 mg     Facility-Administered Medications Ordered in Other Encounters   Medication     alteplase (CATHFLO ACTIVASE) injection 2 mg             Review of Systems:   A complete review of systems was performed and is negative except as noted in the HPI           Physical Exam:   /89 (BP Location: Left arm)   Pulse 60   Temp 96.7  F (35.9  C) (Oral)   Resp 14   Ht 1.6 m (5' 3\")   Wt 70.8 kg (156 lb 1.6 oz)   LMP 03/09/2019   SpO2 100%   BMI 27.65 kg/m    Wt:   Wt Readings from Last 2 Encounters:   03/31/19 70.8 kg (156 lb 1.6 oz)   03/27/19 68 kg (150 lb)      Constitutional: cooperative, pleasant, not dyspneic/diaphoretic, no acute distress  Eyes: Sclera anicteric/injected  Ears/nose/mouth/throat: Normal oropharynx without ulcers or exudate, mucus membranes moist, hearing intact  Neck: supple, thyroid normal size  CV: No edema  Respiratory: Unlabored breathing  Lymph: No axillary, submandibular, supraclavicular or inguinal lymphadenopathy  Abd:  Nondistended, +bs, no hepatosplenomegaly, mildly tender, no peritoneal signs  Skin: warm, perfused, no " jaundice  Neuro: AAO x 3, No asterixis  Psych: Normal affect  MSK: No gross deformities         Data:   Labs and imaging below were independently reviewed and interpreted    BMP  Recent Labs   Lab 03/31/19 0514 03/30/19  1354 03/29/19  0920 03/28/19  1050 03/27/19 1931    140  --   --  136   POTASSIUM 3.8 4.3  --   --  5.2   CHLORIDE 110* 109  --   --  107   SHANKAR 8.2* 8.9  --   --  8.5   CO2 21 20  --   --  21   BUN 12 15 17 18 19   CR 1.62* 1.87* 1.94* 2.00* 2.13*   * 80  --   --  153*     CBC  Recent Labs   Lab 03/31/19 0514 03/30/19  1354 03/28/19  1050 03/27/19 1931   WBC 3.5* 3.8* 5.2 4.7   RBC 4.01 4.71 4.12 3.91   HGB 11.6* 13.8 12.0 11.3*   HCT 35.6 41.7 36.3 35.6   MCV 89 89 88 91   MCH 28.9 29.3 29.1 28.9   MCHC 32.6 33.1 33.1 31.7   RDW 12.0 12.2 12.8 12.8    303 319 273     INRNo lab results found in last 7 days.  LFTs  Recent Labs   Lab 03/30/19  1354 03/27/19 1931   ALKPHOS 126 93   AST 57* 39   ALT 46 24   BILITOTAL 0.2 0.4   PROTTOTAL 7.6 6.5*   ALBUMIN 3.4 2.8*      PANC  Recent Labs   Lab 03/30/19  1354 03/27/19 1931   LIPASE 106 61*       Imaging:  CT A/P  IMPRESSION:   Significant colonic stool burden has substantially improved from prior  examination, with resolution of previously seen fecalized material in  the distal ileum. No findings to suggest bowel obstruction.

## 2019-03-31 NOTE — PLAN OF CARE
Assumed care from [id number]    Pt A&O x4. On tele. Sinus dave during sleep. Boyfriend at bedside. Pt came with preexisting PICC that is used for vancomycin. Pt has LLE wound that is covered. PICC placement was verified recently by MD. Loading dose of vancomycin infusing in PICC. Tylenol given x1, otherwise no pain. Pt is to wear boot and be non wt bearing on LLE. Pt uses crutches. Boyfriend has been helping with ambulation. NPO. Pt still needs stool and urine sample pt is aware. Will continue to monitor and follow POC.

## 2019-03-31 NOTE — PROGRESS NOTES
[facility], [place]    Progress Note - Maroon 1 Service        Date of Admission:  3/30/2019    Assessment & Plan   Samara Oropeza is a 23 year old female with PMH significant for chronic constipation, gastroparesis, and recent left ankle MSSA osteomyelitis on oral clindamycin (now discontinued) and IV vancomycin admitted on 3/30/2019 due to 7 day history of worsening periumbilical abdominal pain and 22 day hx of alternating constipation and diarrhea with maroon colored stools and abdominal CT 3 days ago concerning for sigmoid stricture and low grade obstruction.     #Abdominal Pain  #Nausea  #Vomiting  #Constipation  #Episodic diarrhea  #?Hematochezia  DDx includes colonic obstruction 2/2 sigmoid stricture from inflammatory colitis or anatomical abnormality. Infectious, namely C diff as patient received clinda for treatment of left lower leg cellulitis. She also has had chronic IBD. She has elevated CRP and ESR, which could be from recent soft tissue infection. Lactic acid negative. Obstructive pattern on previous imaging could've been be facilitated by chronic opiate use. Her history of bechet could be the culprit of hematochezia and sigmoid stricture from a healing ulcer. She had colonoscopy and endoscopy in 01/2018 for GI involvement of Behcet, but these were negative. HCG negative so less suspicious for pregnancy or ectopic pregnancy. Denies recent sexual activity and and STD, therefore PID less in the differential. Can't rule out  Hirschsprung disease, but this would be a very late manifestation. CT scan on 03/30/19 showing improvement compared to 03/27/19 and patient reporting of above symptoms. Lipase and LFTs normal.  - ADAT  - GI consulted for need of inpatient colonoscopy vs outpatient  - Deferred enteric and c diff panel due to improvement in diarrhea  - Ondansetron PRN  - Daily CBC  - One time Miralax and Bisacodyl     #JOSE JUAN  DDx includes medication induced as patient  "had elevated trough on 03/27/19 which coincided with elevated Cr. Suspect intrinsic injury. She also takes colchicine and NSAIDs. There could be a pre-renal component from low PO intake. Cr peak at 2.13. Has granular casts present on UA suggestive of AIN if WBC. FeNa 2.2 which supports intrinsic kidney injury from supra-therapeutic vancomycin.  - Hold colchicine, NSAIDs  - Fluids as above  - Strict I/Os  - Daily BMP     #Hyperchloremia  From IVF.   - Recheck in AM    #Hypomagnesemia  Suspect GI loss.   - Lytes replaced     #Behcets  #Menorrhagia  Patient with prolonged vaginal bleed, presumably from behcets. This could be an indicator of uncontrolled state which might explain GI involvement.  She has tried apremilast, but did not tolerate medication. Appears colchicine has stabilized her symptoms.   - Hemoglobin trend  - Holding colchicine due to shannan, but will resume once Cr corrects    #Acute anemia  #Leukopenia  Patient with hgb 13.8 on admission. Now 11.6 after fluid administration. Suspect dilutional and consumptive.   - CBC in AM     #Recent left ankle MSSA abscess and osteomyelitis  - Pharmacy to dose vancomycin for total of 6 weeks, stop date ~ 04/27/19  - WOC consult  - Weekly CRP     #GERD  - Continue PTA omeprazole     #Fibromyalgia  - Continue PTA amitriptyline     #Tourettes  - Continue home guanfacine     #Vasovagal syncope  #Hx of pseudoseizures  Patient had one episode today at home of \"blacking out\", but woke up within seconds without post-ictal state, loss of bladder or bowel control, eyes did not roll back or had convulsive activity which is all suggestive of vasovagal event. Patient has had negative MRI, EEG and autoimmune demyelinating panel. Has been seen by Neuro as well and syncopal episodes appear to be vasovagal as she had positive tilt table test. Had ziopatch evaluated by cardiology and no abnormalities except for few PVC and PACs. Cardiac MR also negative.  - On tele     Diet: Advance " Diet as Tolerated: Regular Diet Adult    Fluids: none  Lines: 1PICC  DVT Prophylaxis: Pneumatic Compression Devices due to possible GI bleed  Wilson Catheter: not present  Code Status: Full Code      Disposition Plan   Expected discharge: 1-2 days, recommended to prior living arrangement once hemoglobin and GI symptoms stable.  Entered: Rm Aaron MD 03/31/2019, 1:06 PM       The patient's care was discussed with the Attending Physician, Dr. Alvarado.    Rm Aaron MD  [place] [id number] Service  [facility], [place]  Pager: [phone]  Please see sticky note for cross cover information  ______________________________________________________________________    Interval History   No overnight events. Had one bowel movement that was more formed than previously. No hematochezia or melena. Abdominal pain improving. Hungry. 4-point ROS negative.         Physical Exam   Vital Signs: Temp: 97.3  F (36.3  C) Temp src: Oral BP: (!) 146/97 Pulse: 51   Resp: 14 SpO2: 99 % O2 Device: None (Room air)    Weight: 156 lbs 3.2 oz     Intake/Output Summary (Last 24 hours) at 3/31/2019 1310  Last data filed at 3/31/2019 1100  Gross per 24 hour   Intake --   Output 500 ml   Net -500 ml     General: female in NAD  HEENT: No scleral icterus. NCAT. PERRL, EOMI. No JVD. No LAD. No oropharyngeal exudate or erythema.  Cardiac: RRR. No m/r/g. Normal S1, S2.  Pulm: CTAB, no wheezes, or crackles. Normal respiratory effort on RA.  Abd: Soft, non distended, No guarding or rebound. No hepatosplenomegaly. Epigastrium moderately TTP. LUQ, RUQ, LLQ, RLQ NTTP  Skin: No jaundice or rashes observed. Lesion on left forearm which appears to be from previous unsuccessful IV placement. PICC line present  Extremities: No LE edema  MSK: Moving all limbs symmetrically. No joint inflammation noted. Left foot is in a boot.  Neuro: A&Ox3, no focal deficits, strength 5/5 throughout, CN II-XII grossly intact.  Psych: euthymic mood      Data   Recent  Labs   Lab 03/31/19  0514 03/30/19  1354 03/29/19  0920 03/28/19  1050 03/27/19  1931   WBC 3.5* 3.8*  --  5.2 4.7   HGB 11.6* 13.8  --  12.0 11.3*   MCV 89 89  --  88 91    303  --  319 273    140  --   --  136   POTASSIUM 3.8 4.3  --   --  5.2   CHLORIDE 110* 109  --   --  107   CO2 21 20  --   --  21   BUN 12 15 17 18 19   CR 1.62* 1.87* 1.94* 2.00* 2.13*   ANIONGAP 9 11  --   --  8   SHANKAR 8.2* 8.9  --   --  8.5   * 80  --   --  153*   ALBUMIN  --  3.4  --   --  2.8*   PROTTOTAL  --  7.6  --   --  6.5*   BILITOTAL  --  0.2  --   --  0.4   ALKPHOS  --  126  --   --  93   ALT  --  46  --   --  24   AST  --  57*  --   --  39   LIPASE  --  106  --   --  61*     Recent Results (from the past 24 hour(s))   XR Abdomen 2 Views    Narrative    XR ABDOMEN 2 VW 3/30/2019 1:28 PM    History: abd pain, nausea, hx severe constipation ? obstruction    Comparison: 3/27/2018    Findings: Moderate fecal load throughout the colon and rectum.  Nonobstructive bowel gas pattern. No intra-abdominal free air. No  suspicious calcification. Bones are unremarkable.      Impression    Impression: Moderate fecal load with nonobstructive bowel gas pattern.    DAGMAR MAI   CT Abdomen Pelvis w/o Contrast    Narrative    EXAMINATION: CT ABDOMEN PELVIS W/O CONTRAST, 3/30/2019 5:14 PM    TECHNIQUE:  Helical CT images from the lung bases through the iliac  crests were obtained without IV contrast.     COMPARISON: Same day radiograph, 3/27/2019    HISTORY: Abd distension; WORSENING ABDOMINAL PAIN AND SIGNS OF BOWEL  OBSTRUCTION, PLEASE COMPARE TO CT FROM 4 DAYS AGO WHICH SHOWED SOME  BOWEL OBSTRUCTION    FINDINGS:    Abdomen and pelvis: The liver, gallbladder, pancreas, spleen, adrenal  glands, kidneys, bladder, and pelvic organs are unremarkable in  appearance. No abnormally dilated loops of small bowel or large bowel.  Previously seen substantial colonic stool burden has significantly  improved, with resolved fecalized  material in the distal small bowel.  No intraperitoneal free fluid or free air. No pneumatosis or portal  venous gas. The major abdominal vasculature is normal in caliber.  Scattered retroperitoneal and mesenteric lymph nodes are again seen.  No suspicious abdominal lymphadenopathy.    Lung bases:  The visualized lung bases are clear.    Bones and soft tissues: No acute or suspicious appearing osseous  abnormality.      Impression    IMPRESSION:   Significant colonic stool burden has substantially improved from prior  examination, with resolution of previously seen fecalized material in  the distal ileum. No findings to suggest bowel obstruction.    I have personally reviewed the examination and initial interpretation  and I agree with the findings.    PERCY OLVERA MD   XR Chest Port 1 View    Narrative    XR CHEST PORT 1 VW 3/30/2019 10:00 PM    History: PICC placement    Comparison: None.    Findings: Single AP view of the chest. The heart size is normal. No  focal pulmonary opacities. No significant pleural effusion or  pneumothorax. No acute bony normalities. Right-sided PICC tip projects  over the high SVC/innominate confluence.      Impression    Impression: Right-sided PICC projects over the high SVC/innominate  confluence.    I have personally reviewed the examination and initial interpretation  and I agree with the findings.    SAMANTHA BOONE MD

## 2019-03-31 NOTE — PROVIDER NOTIFICATION
Web page sent to ivanna jones ([phone]) -  XR verification for PICC obtained but not yet read, unable to administer vanco until verified.      Ivanna LENTZ verified placement. Ivanna also notified about vanco being given at slow rate per pt preference.

## 2019-03-31 NOTE — PHARMACY-VANCOMYCIN DOSING SERVICE
Pharmacy Vancomycin Initial Note  Date of Service 2019  Patient's  1995  23 year old, female    Indication: Osteomyelitis    Current estimated CrCl = Estimated Creatinine Clearance: 43.8 mL/min (A) (based on SCr of 1.87 mg/dL (H)).    Creatinine for last 3 days  3/27/2019:  7:31 PM Creatinine 2.13 mg/dL  3/28/2019: 10:50 AM Creatinine 2.00 mg/dL  3/29/2019:  9:20 AM Creatinine 1.94 mg/dL  3/30/2019:  1:54 PM Creatinine 1.87 mg/dL    Recent Vancomycin Level(s) for last 3 days  3/28/2019: 10:50 AM Vancomycin Level 24.7 mg/L  3/29/2019:  9:20 AM Vancomycin Level 13.3 mg/L      Vancomycin IV Administrations (past 72 hours)      No vancomycin orders with administrations in past 72 hours.                Nephrotoxins and other renal medications (From now, onward)    Start     Dose/Rate Route Frequency Ordered Stop    19 0330  vancomycin 1250 mg in 0.9% NaCl 250 mL intermittent infusion 1,250 mg      1,250 mg  over 90 Minutes Intravenous EVERY 8 HOURS 197      19 191  vancomycin (VANCOCIN) 2,000 mg in sodium chloride 0.9 % 500 mL intermittent infusion      2,000 mg  over 2 Hours Intravenous ONCE 19            Contrast Orders - past 72 hours (72h ago, onward)    None                Plan:  1.  Patient has been on vancomycin at home, however, has not had a dose since 3/29 ~1500. Will give loading dose of vancomycin 2g IV x 1 at this time and start vancomycin 1250mg IV q8h (empirically reducing maintenance dose from home dose given current JOSE JUAN).   2.  Goal Trough Level: 15-20 mg/L   3.  Pharmacy will check trough levels as appropriate in 1-3 Days.    4. Serum creatinine levels will be ordered daily for the first week of therapy and at least twice weekly for subsequent weeks.      Zuleyma Ortega, PharmD x *[phone]

## 2019-03-31 NOTE — PLAN OF CARE
Patient A&O x 4 this shift. VSS on RA. Patient up independently. Patient to be NWB LLE. Has CAM boot and crutches she uses when out of bed. Single lumen PICC currently infusing Vanco at 100 mL/hr. Patient experiences pain with infusion if infused at more than 100 mL/hr. Magnesium to be replaced following completion of Vanco infusion. Bisacodyl and Miralax given as one time order this afternoon. Patient voiding adequately. Upgraded to clear liquid diet this afternoon. Tolerating well. No c/o pain this shift. Patient has surgical incision to left lower foot/ankle from surgery for Osteomyelitis infection. Incision closed with sutures and WDL. Dressing changed today. Continue with current plan of care.

## 2019-03-31 NOTE — PLAN OF CARE
"Care from 15:00 - 19:00     /89 (BP Location: Left arm)   Pulse 60   Temp 96.7  F (35.9  C) (Oral)   Resp 14   Ht 1.6 m (5' 3\")   Wt 70.8 kg (156 lb 1.6 oz)   LMP 03/09/2019   SpO2 100%   BMI 27.65 kg/m      Pt a&o x 4. VSS on room air. Magnesium replaced and pt switched over to NS IVF per orders. Pt has not had a bowel movement today despite laxative regimen and is not passing gas. Gastroenterology came to see pt and stated they do not think a procedure is indicated at this time. Pt made few requests, no acute changes. Continue to monitor return of bowel function.   "

## 2019-04-01 ENCOUNTER — HOME INFUSION (PRE-WILLOW HOME INFUSION) (OUTPATIENT)
Dept: PHARMACY | Facility: CLINIC | Age: 24
End: 2019-04-01

## 2019-04-01 ENCOUNTER — PATIENT OUTREACH (OUTPATIENT)
Dept: CARE COORDINATION | Facility: CLINIC | Age: 24
End: 2019-04-01

## 2019-04-01 VITALS
HEIGHT: 63 IN | HEART RATE: 71 BPM | SYSTOLIC BLOOD PRESSURE: 135 MMHG | BODY MASS INDEX: 27.66 KG/M2 | WEIGHT: 156.1 LBS | RESPIRATION RATE: 16 BRPM | OXYGEN SATURATION: 99 % | DIASTOLIC BLOOD PRESSURE: 83 MMHG | TEMPERATURE: 97.9 F

## 2019-04-01 LAB
ANION GAP SERPL CALCULATED.3IONS-SCNC: 7 MMOL/L (ref 3–14)
BUN SERPL-MCNC: 10 MG/DL (ref 7–30)
CALCIUM SERPL-MCNC: 8.2 MG/DL (ref 8.5–10.1)
CHLORIDE SERPL-SCNC: 110 MMOL/L (ref 94–109)
CO2 SERPL-SCNC: 22 MMOL/L (ref 20–32)
CREAT SERPL-MCNC: 1.45 MG/DL (ref 0.52–1.04)
CRP SERPL-MCNC: 8.4 MG/L (ref 0–8)
ERYTHROCYTE [DISTWIDTH] IN BLOOD BY AUTOMATED COUNT: 12 % (ref 10–15)
ERYTHROCYTE [SEDIMENTATION RATE] IN BLOOD BY WESTERGREN METHOD: 14 MM/H (ref 0–20)
GFR SERPL CREATININE-BSD FRML MDRD: 50 ML/MIN/{1.73_M2}
GLUCOSE SERPL-MCNC: 102 MG/DL (ref 70–99)
HCT VFR BLD AUTO: 35.2 % (ref 35–47)
HGB BLD-MCNC: 11.6 G/DL (ref 11.7–15.7)
INTERPRETATION ECG - MUSE: NORMAL
MAGNESIUM SERPL-MCNC: 1.9 MG/DL (ref 1.6–2.3)
MAGNESIUM SERPL-MCNC: 2 MG/DL (ref 1.6–2.3)
MCH RBC QN AUTO: 29 PG (ref 26.5–33)
MCHC RBC AUTO-ENTMCNC: 33 G/DL (ref 31.5–36.5)
MCV RBC AUTO: 88 FL (ref 78–100)
PLATELET # BLD AUTO: 267 10E9/L (ref 150–450)
POTASSIUM SERPL-SCNC: 4 MMOL/L (ref 3.4–5.3)
RBC # BLD AUTO: 4 10E12/L (ref 3.8–5.2)
SODIUM SERPL-SCNC: 139 MMOL/L (ref 133–144)
VANCOMYCIN SERPL-MCNC: 47.3 MG/L
WBC # BLD AUTO: 3.4 10E9/L (ref 4–11)

## 2019-04-01 PROCEDURE — 85652 RBC SED RATE AUTOMATED: CPT | Performed by: STUDENT IN AN ORGANIZED HEALTH CARE EDUCATION/TRAINING PROGRAM

## 2019-04-01 PROCEDURE — 36415 COLL VENOUS BLD VENIPUNCTURE: CPT | Performed by: STUDENT IN AN ORGANIZED HEALTH CARE EDUCATION/TRAINING PROGRAM

## 2019-04-01 PROCEDURE — 83735 ASSAY OF MAGNESIUM: CPT | Performed by: STUDENT IN AN ORGANIZED HEALTH CARE EDUCATION/TRAINING PROGRAM

## 2019-04-01 PROCEDURE — 80048 BASIC METABOLIC PNL TOTAL CA: CPT | Performed by: STUDENT IN AN ORGANIZED HEALTH CARE EDUCATION/TRAINING PROGRAM

## 2019-04-01 PROCEDURE — 36592 COLLECT BLOOD FROM PICC: CPT | Performed by: STUDENT IN AN ORGANIZED HEALTH CARE EDUCATION/TRAINING PROGRAM

## 2019-04-01 PROCEDURE — 80202 ASSAY OF VANCOMYCIN: CPT | Performed by: INTERNAL MEDICINE

## 2019-04-01 PROCEDURE — 25800030 ZZH RX IP 258 OP 636: Performed by: INTERNAL MEDICINE

## 2019-04-01 PROCEDURE — 86140 C-REACTIVE PROTEIN: CPT | Performed by: STUDENT IN AN ORGANIZED HEALTH CARE EDUCATION/TRAINING PROGRAM

## 2019-04-01 PROCEDURE — 25000128 H RX IP 250 OP 636: Performed by: INTERNAL MEDICINE

## 2019-04-01 PROCEDURE — 85027 COMPLETE CBC AUTOMATED: CPT | Performed by: STUDENT IN AN ORGANIZED HEALTH CARE EDUCATION/TRAINING PROGRAM

## 2019-04-01 PROCEDURE — 36592 COLLECT BLOOD FROM PICC: CPT | Performed by: INTERNAL MEDICINE

## 2019-04-01 PROCEDURE — 25000132 ZZH RX MED GY IP 250 OP 250 PS 637: Performed by: STUDENT IN AN ORGANIZED HEALTH CARE EDUCATION/TRAINING PROGRAM

## 2019-04-01 PROCEDURE — 25000128 H RX IP 250 OP 636: Performed by: EMERGENCY MEDICINE

## 2019-04-01 PROCEDURE — 99238 HOSP IP/OBS DSCHRG MGMT 30/<: CPT | Mod: GC | Performed by: INTERNAL MEDICINE

## 2019-04-01 PROCEDURE — 25000132 ZZH RX MED GY IP 250 OP 250 PS 637: Performed by: INTERNAL MEDICINE

## 2019-04-01 PROCEDURE — 25800030 ZZH RX IP 258 OP 636: Performed by: EMERGENCY MEDICINE

## 2019-04-01 RX ORDER — BISACODYL 5 MG/1
10 TABLET, DELAYED RELEASE ORAL DAILY PRN
Qty: 30 TABLET | Refills: 0 | Status: SHIPPED | OUTPATIENT
Start: 2019-04-01

## 2019-04-01 RX ADMIN — VANCOMYCIN HYDROCHLORIDE 1250 MG: 10 INJECTION, POWDER, LYOPHILIZED, FOR SOLUTION INTRAVENOUS at 03:55

## 2019-04-01 RX ADMIN — ONDANSETRON 4 MG: 2 INJECTION INTRAMUSCULAR; INTRAVENOUS at 02:48

## 2019-04-01 RX ADMIN — SODIUM CHLORIDE, PRESERVATIVE FREE 20 ML: 5 INJECTION INTRAVENOUS at 05:51

## 2019-04-01 RX ADMIN — ACETAMINOPHEN 650 MG: 325 TABLET, FILM COATED ORAL at 08:01

## 2019-04-01 RX ADMIN — OMEPRAZOLE 40 MG: 20 CAPSULE, DELAYED RELEASE ORAL at 07:57

## 2019-04-01 RX ADMIN — GUANFACINE HYDROCHLORIDE 3 MG: 1 TABLET ORAL at 07:57

## 2019-04-01 ASSESSMENT — ACTIVITIES OF DAILY LIVING (ADL)
ADLS_ACUITY_SCORE: 16

## 2019-04-01 NOTE — PROGRESS NOTES
This is a recent snapshot of the patient's [facility] medical record.  For current drug dose and complete information and questions, call [phone] or In Basket pool, [facility] ([id number])  CSN Number:  [id number]

## 2019-04-01 NOTE — PROGRESS NOTES
GASTROENTEROLOGY PROGRESS NOTE          ASSESSMENT AND RECOMMENDATIONS:   Samara Oropeza is a 23 year old female with a past medical history of chronic constipation (use to follow in the GI clinic), gastroparesis, recent left ankle MSSA osteomyelitis admitted on 3/30/2019 with 7 day history of worsening periumbilical abdominal pain. Patient reports significant constipation for the last 22 days with some days alternating with diarrhea (likely overflow). CT done 3/27 showed concern for sigmoid stricture although after improvement of stool burden on repeat CT no stricture was noted. She has a colonoscopy that is normal in January 2018. Suspect her worsening constipation was secondary to narcotic use given recent surgery on her left ankle. Currently stool burden improved on repeat CT scan; and patient's constipation with hematochezia is resolved. We continue aggressive bowel regimen with miralax and lactulose as needed. No indication for inpatient colonoscopy. Patient should follow up in the GI clinic.    Recommendations  --Diet as tolerated   --Continue miralax 17g TID  --Can add lactulose 20g BID if needed  --Agree with dulcolax   --Encourage hydration   --GI luminal is signing off patient to primary team     Pt care plan discussed with Dr. Russell, GI staff physician. Thank you for involving us in this patient's care. Please do not hesitate to contact the GI service with any questions or concerns.    EVI Jaffe [facility]  Department of Gastroenterology   P: [phone]  Subjective\events within the 24 hours:   Patient reporting having loose mucousy stool but no blood or black stools. She is tolerating diet and pain is resolved.     4 point ROS performed and negative unless noted above.           Medications:     Current Facility-Administered Medications   Medication     acetaminophen (TYLENOL) tablet 650 mg     albuterol (PROAIR HFA/PROVENTIL HFA/VENTOLIN HFA) 108 (90 Base) MCG/ACT inhaler 2 puff      "amitriptyline (ELAVIL) tablet 25 mg     artificial tears ophthalmic ointment     betamethasone valerate (VALISONE) 0.1 % cream     clobetasol (TEMOVATE) 0.05 % cream     dicyclomine (BENTYL) tablet 20 mg     guanFACINE (TENEX) tablet 3 mg     hyoscyamine (ANASPAZ/LEVSIN) tablet 125-250 mcg     magic mouthwash suspension (diphenhydrAMINE, lidocaine, aluminum-magnesium & simethicone)     melatonin tablet 1 mg     naloxone (NARCAN) injection 0.1-0.4 mg     omeprazole (priLOSEC) CR capsule 40 mg     sodium chloride (PF) 0.9% PF flush 3 mL     sodium chloride (PF) 0.9% PF flush 3 mL     vancomycin 1250 mg in 0.9% NaCl 250 mL intermittent infusion 1,250 mg     Facility-Administered Medications Ordered in Other Encounters   Medication     alteplase (CATHFLO ACTIVASE) injection 2 mg        Physical Exam   Blood pressure 135/83, pulse 71, temperature 97.9  F (36.6  C), temperature source Oral, resp. rate 16, height 1.6 m (5' 3\"), weight 70.8 kg (156 lb 1.6 oz), last menstrual period 03/09/2019, SpO2 99 %.  Constitutional: cooperative, pleasant, not dyspneic/diaphoretic, no acute distress  Eyes: Sclera anicteric/injected  Ears/nose/mouth/throat: Normal oropharynx without ulcers or exudate, mucus membranes moist, hearing intact  Neck: supple, thyroid normal size  CV: No edema  Respiratory: Unlabored breathing  Lymph: No axillary, submandibular, supraclavicular or inguinal lymphadenopathy  Abd:  Nondistended, +bs, no hepatosplenomegaly, mildly tender, no peritoneal signs  Skin: warm, perfused, no jaundice  Neuro: AAO x 3, No asterixis  Psych: Normal affect  MSK: No gross deformities    Data   Current Labs  CBC  Recent Labs   Lab 04/01/19  0744 03/31/19  0514 03/30/19  1354 03/28/19  1050   WBC 3.4* 3.5* 3.8* 5.2   RBC 4.00 4.01 4.71 4.12   HGB 11.6* 11.6* 13.8 12.0   HCT 35.2 35.6 41.7 36.3   MCV 88 89 89 88   MCH 29.0 28.9 29.3 29.1   MCHC 33.0 32.6 33.1 33.1   RDW 12.0 12.0 12.2 12.8    284 303 319     BMP  Recent Labs "   Lab 04/01/19  0744 04/01/19  0601 03/31/19  0514 03/30/19  1354 03/29/19  0920  03/27/19  1931     --  140 140  --   --  136   POTASSIUM 4.0  --  3.8 4.3  --   --  5.2   CHLORIDE 110*  --  110* 109  --   --  107   CO2 22  --  21 20  --   --  21   ANIONGAP 7  --  9 11  --   --  8   *  --  114* 80  --   --  153*   BUN 10  --  12 15 17   < > 19   CR 1.45*  --  1.62* 1.87* 1.94*   < > 2.13*   GFRESTIMATED 50*  --  44* 37* 35*   < > 31*   GFRESTBLACK 58*  --  51* 43* 41*   < > 36*   SHANKAR 8.2*  --  8.2* 8.9  --   --  8.5   MAG 1.9 2.0 1.5*  --   --   --   --     < > = values in this interval not displayed.      INRNo lab results found in last 7 days.  Liver panel  Recent Labs   Lab 03/30/19  1354 03/27/19  1931   PROTTOTAL 7.6 6.5*   ALBUMIN 3.4 2.8*   BILITOTAL 0.2 0.4   ALKPHOS 126 93   AST 57* 39   ALT 46 24            History of Present Illness:   Samara Oropeza is a 23 year old female with a history of chronic constipation (use to follow in the GI clinic), gastroparesis, recent left ankle MSSA osteomyelitis admitted on 3/30/2019 with 7 day history of worsening periumbilical abdominal pain. Patient reports significant constipation for the last 22 days with some days alternating with diarrhea (likely overflow). CT done 3/27 showed concern for sigmoid stricture although after improvement of stool burden on repeat CT no stricture was noted. She has a colonoscopy that is normal in January 2018. Has been also taking narcotics for recent I&D of left ankle abscess. Patient denies any melena or bloody stools. She reports nausea and vomiting. Currently improving, reports large BM yesterday though. No fevers or other signs of infection

## 2019-04-01 NOTE — PROGRESS NOTES
Patient has clinic visit within 24-48 hours of Discharge so no post DC follow up call is needed    4/2/2019 Status: Junior    Time: 8:30 AM Length: 15   Visit Type: RETURN [657] PREMA:     Provider: Andres Johnson DPM Department: [facility]  PODIATRY   Special Needs:   Comments:

## 2019-04-01 NOTE — DISCHARGE SUMMARY
[facility], [place]  Discharge Summary - Medicine & Pediatrics       Date of Admission:  3/30/2019  Date of Discharge:  4/1/2019  1:47 PM  Discharging Provider: Dr. Alvarado  Discharge Service: Ivanna Martell    Discharge Diagnoses   Abdominal pain  Low grade bowel obstruction  Nausea  Vomiting  Constipation  Episodic diarrhea  Hematochezia  Intrinsic JOSE JUAN  Hyperchloremia  Hypomagnesemia  Behcets  Menorrhagia  Acute anemia  Leukopenia  MSSA osteomyelitis  GERD Fibromyalgia  Tourettes  Vasovagal syncope  Hx of pseudoseizures  Follow-ups Needed After Discharge   Follow-up Appointments     Adult [facility] Follow-up and recommended labs and tests      Follow up with primary care provider, Sonja Abreu, within 7 days   for hospital follow- up.  The following labs/tests are recommended: BMP,   CBC.      Appointments on [place] and/or [facility] (with [facility] or [facility]   provider or service). Call [phone] if you haven't heard regarding   these appointments within 7 days of discharge.             Unresulted Labs Ordered in the Past 30 Days of this Admission     No orders found from 1/29/2019 to 3/31/2019.      These results will be followed up by PCP    Discharge Disposition   Discharged to home  Condition at discharge: Stable    Hospital Course   Samara Oropeza is a 23 year old female with PMH significant for chronic constipation, gastroparesis, and recent left ankle MSSA osteomyelitis on oral clindamycin (now discontinued) and IV vancomycin admitted on 3/30/2019 due to 7 day history of worsening periumbilical abdominal pain and 22 day hx of alternating constipation and diarrhea with maroon colored stools and abdominal CT 3 days ago concerning for sigmoid stricture and low grade obstruction.     #Low grade bowel obstruction  #?Sigmoid stricture  #Abdominal Pain  #Nausea  #Vomiting  #Constipation  #Episodic diarrhea  #?Hematochezia  Considered colonic obstruction 2/2 sigmoid  stricture from inflammatory colitis or anatomical abnormality or past ulcer from behcets. Infectious causes, namely C diff as patient received clinda for treatment of left lower leg cellulitis were possible but patient did not have diarrhea in the hospital.  She had elevated CRP and ESR, which could be from recent soft tissue infection, see below. Lactic acid negative. Obstructive pattern on previous imaging could've been be facilitated by chronic opiate use as well.  She had colonoscopy and endoscopy in 01/2018 for GI involvement of Behcet, but these were negative. HCG negative so less suspicious for pregnancy or ectopic pregnancy. Denies recent sexual activity and and STD, therefore PID less in the differential. Can't rule out  Hirschsprung disease, but this would be a very late manifestation. CT scan on 03/30/19 showing improvement compared to 03/27/19 and patient reporting of above symptoms. Lipase and LFTs normal. GI evaluated patient and she has referral for colonoscopy in clinic to elucidate her sigmoid stricture.     #JOSE JUAN  DDx includes medication induced as patient had elevated trough on 03/27/19 which coincided with elevated Cr. Suspect intrinsic injury. She also takes colchicine and NSAIDs. There could be a pre-renal component from low PO intake. Cr peak at 2.13. Has granular casts present on UA suggestive of AIN if WBC. FeNa 2.2 which supports intrinsic kidney injury from supra-therapeutic vancomycin. On discharged she again had supratherapeutic trough level of her vancomycin. Informed [facility] infusion pharmacist, and patient will skip today's dose and have recheck level tomorrow. Instructed to hold colchicine and NSAIDs for now as her JOSE JUAN recovers and vanc levels are more stable.      #Hyperchloremia  From IVF.      #Hypomagnesemia  Suspect GI loss. Replaced.      #Behcets  #Menorrhagia  Patient with prolonged vaginal bleed, presumably from behcets. This could be an indicator of uncontrolled state  "which might explain GI involvement.  She has tried apremilast, but did not tolerate medication. Appears colchicine has stabilized her symptoms. Hemoglobin stable. Holding colchicine due to JOSE JUAN. Restarting med should be addressed by PCP.     #Acute anemia  #Leukopenia  Patient with hgb 13.8 on admission. Now 11.6 after fluid administration and has been stable. No hematochezia reported in the hospital. Suspect dilutional and consumptive.      #Recent left ankle MSSA abscess and osteomyelitis  Supratherapeutic levels at discharge. Will continue outpatient vanc level monitoring and infusions.      #GERD  Continued PTA omeprazole.     #Fibromyalgia  Continue PTA amitriptyline.     #Tourettes  Continue home guanfacine.     #Vasovagal syncope  #Hx of pseudoseizures  Patient had one episode today at home of \"blacking out\", but woke up within seconds without post-ictal state, loss of bladder or bowel control, eyes did not roll back or had convulsive activity which is all suggestive of vasovagal event. Patient has had negative MRI, EEG and autoimmune demyelinating panel. Has been seen by Neuro as well and syncopal episodes appear to be vasovagal as she had positive tilt table test. Had ziopatch evaluated by cardiology and no abnormalities except for few PVC and PACs. Cardiac MR also negative. No evidence of syncope or neuro symptoms on admission.     Consultations This Hospital Stay   PHARMACY TO DOSE VANCO  WOUND OSTOMY CONTINENCE NURSE  IP CONSULT  GI LUMINAL ADULT IP CONSULT    Code Status   Full Code       The patient was discussed with MD Ivanna Alex 1 Service  [facility], [place]  Pager: [phone]  ______________________________________________________________________    Physical Exam   Vital Signs: Temp: 97.9  F (36.6  C) Temp src: Oral BP: 135/83 Pulse: 71   Resp: 16 SpO2: 99 % O2 Device: None (Room air)    Weight: 156 lbs 1.6 oz    General: female in NAD  HEENT: " No scleral icterus. NCAT. PERRL, EOMI. No JVD. No LAD. No oropharyngeal exudate or erythema.  Cardiac: RRR. No m/r/g. Normal S1, S2.  Pulm: CTAB, no wheezes, or crackles. Normal respiratory effort on RA.  Abd: Soft, non distended, No guarding or rebound. No hepatosplenomegaly. Epigastrium moderately TTP. LUQ, RUQ, LLQ, RLQ NTTP  Skin: No jaundice or rashes observed. Lesion on left forearm which appears to be from previous unsuccessful IV placement. PICC line present  Extremities: No LE edema  MSK: Moving all limbs symmetrically. No joint inflammation noted. Left foot is in a boot.  Neuro: A&Ox3, no focal deficits, strength 5/5 throughout, CN II-XII grossly intact.  Psych: euthymic mood         Primary Care Physician   Sonja Abreu    Immunizations       Discharge Orders      Medication Therapy Management Referral      Home infusion referral      GASTROENTEROLOGY ADULT REF PROCEDURE ONLY [facility] [phone]      Reason for your hospital stay    You were admitted to the hospital because of low grade intestinal obstruction that improved with laxatives.     Adult [facility] Follow-up and recommended labs and tests    Follow up with primary care provider, Sonja Abreu, within 7 days for hospital follow- up.  The following labs/tests are recommended: BMP, CBC.      Appointments on [place] and/or [facility] (with [facility] or [facility] provider or service). Call [phone] if you haven't heard regarding these appointments within 7 days of discharge.     Activity    Your activity upon discharge: activity as tolerated     Discharge Instructions    Follow up with GI for colonoscopy. We have placed a referral for you. Also see your primary care doctor within 1-week and have a BMP to check your renal function.     Full Code     Diet    Follow this diet upon discharge: Orders Placed This Encounter      Advance Diet as Tolerated: Regular Diet Adult       Significant Results and Procedures   Results for  orders placed or performed during the hospital encounter of 03/30/19   XR Abdomen 2 Views    Narrative    XR ABDOMEN 2 VW 3/30/2019 1:28 PM    History: abd pain, nausea, hx severe constipation ? obstruction    Comparison: 3/27/2018    Findings: Moderate fecal load throughout the colon and rectum.  Nonobstructive bowel gas pattern. No intra-abdominal free air. No  suspicious calcification. Bones are unremarkable.      Impression    Impression: Moderate fecal load with nonobstructive bowel gas pattern.    DAGMAR MAI   CT Abdomen Pelvis w/o Contrast    Narrative    EXAMINATION: CT ABDOMEN PELVIS W/O CONTRAST, 3/30/2019 5:14 PM    TECHNIQUE:  Helical CT images from the lung bases through the iliac  crests were obtained without IV contrast.     COMPARISON: Same day radiograph, 3/27/2019    HISTORY: Abd distension; WORSENING ABDOMINAL PAIN AND SIGNS OF BOWEL  OBSTRUCTION, PLEASE COMPARE TO CT FROM 4 DAYS AGO WHICH SHOWED SOME  BOWEL OBSTRUCTION    FINDINGS:    Abdomen and pelvis: The liver, gallbladder, pancreas, spleen, adrenal  glands, kidneys, bladder, and pelvic organs are unremarkable in  appearance. No abnormally dilated loops of small bowel or large bowel.  Previously seen substantial colonic stool burden has significantly  improved, with resolved fecalized material in the distal small bowel.  No intraperitoneal free fluid or free air. No pneumatosis or portal  venous gas. The major abdominal vasculature is normal in caliber.  Scattered retroperitoneal and mesenteric lymph nodes are again seen.  No suspicious abdominal lymphadenopathy.    Lung bases:  The visualized lung bases are clear.    Bones and soft tissues: No acute or suspicious appearing osseous  abnormality.      Impression    IMPRESSION:   Significant colonic stool burden has substantially improved from prior  examination, with resolution of previously seen fecalized material in  the distal ileum. No findings to suggest bowel obstruction.    I have  personally reviewed the examination and initial interpretation  and I agree with the findings.    PERCY OLVERA MD   XR Chest Port 1 View    Narrative    XR CHEST PORT 1 VW 3/30/2019 10:00 PM    History: PICC placement    Comparison: None.    Findings: Single AP view of the chest. The heart size is normal. No  focal pulmonary opacities. No significant pleural effusion or  pneumothorax. No acute bony normalities. Right-sided PICC tip projects  over the high SVC/innominate confluence.      Impression    Impression: Right-sided PICC projects over the high SVC/innominate  confluence.    I have personally reviewed the examination and initial interpretation  and I agree with the findings.    SAMANTHA BOONE MD     *Note: Due to a large number of results and/or encounters for the requested time period, some results have not been displayed. A complete set of results can be found in Results Review.       Discharge Medications   Discharge Medication List as of 4/1/2019  1:04 PM      START taking these medications    Details   bisacodyl (DULCOLAX) 5 MG EC tablet Take 2 tablets (10 mg) by mouth daily as needed for constipation, Disp-30 tablet, R-0, E-Prescribe         CONTINUE these medications which have CHANGED    Details   sodium chloride 0.9% SOLN BOLUS Inject 1,000 mLs into the vein daily as needed (IV abx and flushes), Disp-1000 mL, R-11, E-Prescribe         CONTINUE these medications which have NOT CHANGED    Details   albuterol (PROAIR HFA/PROVENTIL HFA/VENTOLIN HFA) 108 (90 BASE) MCG/ACT Inhaler Inhale 2 puffs into the lungs every 6 hours as needed for shortness of breath / dyspnea or wheezing, Disp-1 Inhaler, R-1, E-Prescribe      amitriptyline (ELAVIL) 25 MG tablet Take 1 tablet (25 mg) by mouth At Bedtime, Disp-30 tablet, R-1, E-Prescribe      artificial tears OINT ophthalmic ointment 0.5 inch strip each eye at night, Disp-1 Tube, R-11, E-Prescribe      aspirin (ASPIRIN CHILDRENS) 81 MG chewable tablet Take 81 mg  by mouth as needed , Historical      benzocaine (TOPICALE XTRA) 20 % GEL Apply as needed locally to mouth or nasal ulcers for pain; 4 times daily as needed, Disp-30 g, R-1, E-Prescribe      betamethasone valerate (VALISONE) 0.1 % cream Apply topically 2 times dailyHistorical      !! botulinum toxin type A (BOTOX) 100 UNITS injection Inject 200 Units into the muscle every 3 months, Disp-200 Units, R-3, InjectionTotal annual dose not to exceed 1000 units Botox.  Clinic administered medication.  Frequency may exceed 4x/year.  Dx Code G24.3 - 89495  Dx Code G24.9 - 13377, 81387  EMG 95 [phone]      clobetasol (TEMOVATE) 0.05 % cream Apply topically 2 times dailyDisp-60 g, [id number]      cyproheptadine (PERIACTIN) 4 MG tablet Take 1 tablet (4 mg) by mouth At Bedtime For nightmares, Disp-30 tablet, R-2, E-Prescribe      dicyclomine (BENTYL) 20 MG tablet Take 1 tablet (20 mg) by mouth 4 times daily as needed, Disp-120 tablet, R-3, E-Prescribe      diphenhydrAMINE (BENADRYL) 25 MG tablet Take 1 tablet (25 mg) by mouth 3 times daily as needed (itching), Disp-40 tablet, R-1, E-Prescribe      EPINEPHrine (EPIPEN 2-EAMON) 0.3 MG/0.3ML injection Inject 0.3 mLs (0.3 mg) into the muscle as needed for anaphylaxis, Disp-0.6 mL, R-3, E-Prescribe      gabapentin (NEURONTIN) 100 MG capsule Take 1 capsule (100 mg) by mouth 3 times daily as needed (anxiety), Disp-90 capsule, R-1, E-Prescribe      guanFACINE (TENEX) 1 MG tablet Take 3 tablets (3 mg) by mouth twice daily in the morning and evening., Disp-180 tablet, R-3, E-Prescribe      hydrocortisone 1 % CREA cream Place rectally 2 times daily as needed for itchingDisp-28.35 g, [id number]      hyoscyamine (ANASPAZ/LEVSIN) 0.125 MG tablet Take 1-2 tablets (125-250 mcg) by mouth every 4 hours as needed for cramping, Disp-40 tablet, R-1, E-Prescribe      hypromellose (GENTEAL) 0.3 % SOLN 1 drop every hour as needed for dry eyes , Historical      lactulose 20 GM/30ML SOLN Take 30 mLs by  mouth 3 times daily as needed (for constipation), Disp-300 mL, R-3, Local Print      linaclotide (LINZESS) 145 MCG capsule Take 1 capsule (145 mcg) by mouth every morning (before breakfast), Disp-90 capsule, R-1, Local Print      Magic Mouthwash (FV std formula) lidocaine visc 2% 2.5mL/5mL & maalox/mylanta w/ simeth 2.5mL/5mL & diphenhydrAMINE 5mg/5mL Swish and swallow 10 mLs in mouth every 6 hours as needed for mouth sores, Disp-1 Bottle, R-1, Fax      nitroFURantoin macrocrystal-monohydrate (MACROBID) 100 MG capsule Take 1 capsule (100 mg) by mouth At Bedtime, Disp-90 capsule, R-1, Local Print      norgestimate-ethinyl estradiol (ORTHO-CYCLEN/SPRINTEC) 0.25-35 MG-MCG tablet Take 1 tablet by mouth daily, Disp-84 tablet, R-4, E-Prescribe      omeprazole (PRILOSEC) 40 MG capsule Take 1 capsule (40 mg) by mouth daily, Disp-90 capsule, R-3, E-Prescribe      !! OnabotulinumtoxinA (BOTOX IJ) Inject 175 Units into the muscle Lot: [id number]  Exp: 01/2021, Historical      !! OnabotulinumtoxinA (BOTOX IJ) Inject 175 Units into the muscle once Lot # /C3 with Expiration Date:  11/2020, Historical      !! OnabotulinumtoxinA (BOTOX IJ) Inject 165 Units as directed once Lot#: /C3  Exp: 10/2020, Historical      !! OnabotulinumtoxinA (BOTOX IJ) Inject 165 Units as directed once Lot # /C3   Exp:  10/2020, Historical      !! OnabotulinumtoxinA (BOTOX IJ) Inject 165 Units into the muscle once Lot /C3  Exp 06/2020, Historical      ondansetron (ZOFRAN) 8 MG tablet Take 1 tablet (8 mg) by mouth every 8 hours as needed for nausea, Disp-60 tablet, R-1, E-PrescribeSublingual option is better if available.      !! order for DME Roll-A-Bout Walker. Patient can use for 3 monthsDisp-1 Units, R-0, Local Print      !! order for DME Equipment being ordered: size 8 1/2 walking boot tallDisp-1 Device, R-0, Local Print      !! order for DME Equipment being ordered: RollAbout knee scooterDisp-1 Device, R-0, Local Print      !! order  for DME Equipment being ordered: adult pair of crutchesDisp-1 Device, R-0, Local Print      oxyCODONE (ROXICODONE) 5 MG tablet Take 1-2 tabs every 4 hours as needed for foot/ankle pain for 2 days, then 1 tab every 4 hours as needed for 2 days, then 1 tab every 6 hours as needed for 2 days, then 1 tab every 8 hours as needed for 2 days, then 1 tab twice daily as need edfor 2 days , then discontinue, Disp-60 tablet, R-0, Local Print      polyethylene glycol (MIRALAX/GLYCOLAX) powder Take 1 capful by mouth 3 times daily, Historical      sennosides (SENOKOT) 8.6 MG tablet Take 3 tablets by mouth 2 times daily, Disp-240 tablet, R-3, E-Prescribe      sucralfate (CARAFATE) 1 GM/10ML suspension Take 10 mLs (1 g) by mouth 4 times daily, Disp-1200 mL, R-2, E-Prescribe      tiZANidine (ZANAFLEX) 4 MG capsule Take 1 capsule (4 mg) by mouth 3 times daily, Disp-60 capsule, R-1, E-Prescribe      vancomycin 1,500 mg Inject 1,500 mg into the vein every 8 hours, Transitional       !! - Potential duplicate medications found. Please discuss with provider.      STOP taking these medications       colchicine (COLCYRS) 0.6 MG tablet Comments:   Reason for Stopping:         fluconazole (DIFLUCAN) 150 MG tablet Comments:   Reason for Stopping:         ibuprofen (ADVIL/MOTRIN) 600 MG tablet Comments:   Reason for Stopping:             Allergies   Allergies   Allergen Reactions     Amoxil [Penicillins] Rash     Dad unsure of reaction.     Bee Venom Anaphylaxis     Contrast Dye Rash     Contrast Media Ready-Box [facility], 04/09/2014.; Contrast Media Ready-[facility], 04/09/2014.  NOTE: this is a contrast media oral with iodine. Premedicate with methylpred standard for IV contrast, request barium contrast for oral contrast.     Orange Fruit [Citrus] Anaphylaxis     Pineapple Anaphylaxis, Difficulty breathing and Rash     Reglan [Metoclopramide] Other (See Comments)     IV dose only, in ER, rapid heart rate.     Ace Inhibitors      Difficulty in  breathing and GI upset     Amitiza [Lubiprostone] Nausea and Vomiting     Amoxicillin-Pot Clavulanate      Midazolam Unknown     Other reaction(s): Unknown  parent states that when pt takes this medication, she wakes up being very violent .  parent states that when pt takes this medication, she wakes up being very violent .     No Clinical Screening - See Comments      Bleech/ chest tightness, itchy throat, swollen tongue, hives     Tizanidine Other (See Comments)     Confusion, back pain, photophobia, abdominal pain, shaking, anxious       Versed      Coming out of pelvic exam at age of 5, was kicking and screaming when coming out of the versed.     Adhesive Tape Rash     Azithromycin Hives and Rash     Cephalexin Itching and Rash     Itchy mouth  Itchy mouth     Keflex [Cephalexin-Fd&C Yellow #6] Hives

## 2019-04-01 NOTE — PROGRESS NOTES
[facility] [place]    Progress Note - Maroon 1 Service        Date of Admission:  3/30/2019    Assessment & Plan   Samara Oropeza is a 23 year old female with PMH significant for chronic constipation, gastroparesis, and recent left ankle MSSA osteomyelitis on oral clindamycin (now discontinued) and IV vancomycin admitted on 3/30/19 due to JOSE JUAN with creatinine of 1.87, a 7 day history of worsening periumbilical abdominal pain, and 22 day hx of alternating constipation and diarrhea with maroon colored stools and abdominal CT on 3/27/19 concerning for sigmoid stricture and low grade obstruction.     #Abdominal Pain  #Nausea  #Vomiting  #Constipation  #Episodic diarrhea  #?Hematochezia  DDx included exacerbation of IBS as most likely cause. Also some concern for colonic obstruction 2/2 possible sigmoid stricture seen on 3/27 CT from inflammatory colitis or anatomic abnormality. However, repeat CT on 3/30 after miralax and ducolax therapy showed significant improvement in stool burden, resolution of fecalized material in distal ileum, and no evidence of obstruction. Infectious etiology considered, namely C diff as patient received clinda for treatment of left lower leg cellulitis. However, workup was deferred due to improvement in diarrheal sx. The patient's elevated CRP and ESR, were most likely 2/2 PTA osteomyelitis of the left foot rather than infectious colitis. Lactic acid negative. Obstructive pattern on previous imaging could've been be exacerbated by chronic opiate use. Also considered Behcet as cause however patient no ulcerations were visible on physical exam and the patient denied visible oral ulcers. Additionally, colonoscopy and endoscopy in 01/2018 for GI involvement of Behcet were negative. HCG negative so less suspicious for pregnancy or ectopic pregnancy. Patient denied recent sexual activity and hx of STD, therefore PID less in the differential. Lipase and LFTs  normal.  - Deferred enteric and c diff panel due to improvement in diarrhea  - Hgb stable throughout admission; repeat CBC with PCP within one week after discharge  - GI consulted; follow up with GI outpatient to discuss potential colonoscopy  - ADAT  - Ondansetron PRN  - Continue Miralax and Bisacodyl after discharge     #JOSE JUAN  DDx includes medication induced as patient had elevated trough on 03/27/19 which coincided with elevated Cr. Suspect intrinsic injury. She also takes colchicine and NSAIDs. There could be a pre-renal component from low PO intake. Cr peak at 2.13 on 3/27 but has down-trended every day to 1.45 on 4/1/19. Had granular casts present on UA suggestive of AIN if WBC. FeNa 2.2 which supports intrinsic kidney injury from supra-therapeutic vancomycin.  - Hold colchicine, NSAIDs; continue colchicine after BMP with PCP if creatinine has normalized  - Encourage PO intake and fluids  - Repeat BMP with PCP within one week    #Hyperchloremia  From IVF.   - Stable at 110 on 4/1  - Repeat BMP with PCP within one week    #Hypomagnesemia  Suspect GI loss.   - Lytes replaced  - 1.5 (3/31) -> 2.0 (4/1)     #Behcets  #Menorrhagia  Patient with prolonged vaginal bleed, presumably from behcets. This could have been an indicator of uncontrolled state which might explain GI involvement. She has tried apremilast, but did not tolerate medication. Appears colchicine has stabilized her symptoms. Will resume when creatinine at baseline. Patient stated on 4/1 that vaginal bleeding has resolved.   - Repeat BMP with PCP within one week  - Holding colchicine due to jose juan, but will resume once Cr corrects    #Acute anemia  #Leukopenia  Patient with hgb 13.8 on admission. Now 11.6 after fluid administration. Suspect dilutional and consumptive.   - Repeat CBC with PCP within one week     #Recent left ankle MSSA abscess and osteomyelitis  - Pharmacy to dose vancomycin for total of 6 weeks, stop date ~ 04/27/19  - S/p WOC consult  -  "Weekly CRP     #GERD  - Continue PTA omeprazole     #Fibromyalgia  - Continue PTA amitriptyline     #Tourettes  - Continue home guanfacine     #Vasovagal syncope  #Hx of pseudoseizures  Patient had one episode today at home of \"blacking out\", but woke up within seconds without post-ictal state, loss of bladder or bowel control, eyes did not roll back or had convulsive activity which is all suggestive of vasovagal event. Patient has had negative MRI, EEG and autoimmune demyelinating panel. Has been seen by Neuro as well and syncopal episodes appear to be vasovagal as she had positive tilt table test. Had ziopatch evaluated by cardiology and no abnormalities except for few PVC and PACs. Cardiac MR also negative.  - On tele     Diet: Advance Diet as Tolerated: Regular Diet Adult  Diet    Fluids: none  Lines: 1PICC  DVT Prophylaxis: Pneumatic Compression Devices due to possible GI bleed  Wilson Catheter: not present  Code Status: Full Code      Disposition Plan   Expected discharge: Today, recommended to prior living arrangement once hemoglobin and GI symptoms stable.  Entered: Duane Larry 04/01/2019, 11:52 AM       The patient's care was performed under the supervision of senior resident Dr. Aaron as well as the Attending Physician, Dr. Alvarado.    Tony Larry, MS3  Maroon 1 Service  [facility], [place]  Pager: [phone]  Please see sticky note for cross cover information  ______________________________________________________________________    Interval History   COLTJuan A Pascual endorses continued mild epigastric abdominal pain but states that it is dramatically improved since admission. Experienced nausea overnight that was palliated by ondansetron. She experienced a large loose bm last night and another large loose bm this morning. No vomiting, hematochezia or melena. Patient states that her vaginal bleeding has resolved. 4-point ROS otherwise negative.     Physical Exam   Vital Signs: " Temp: 97.9  F (36.6  C) Temp src: Oral BP: 135/83 Pulse: 71   Resp: 16 SpO2: 99 % O2 Device: None (Room air)    Weight: 156 lbs 1.6 oz       General: female in NAD  HEENT: No oropharyngeal ulcers, exudate or erythema.  Cardiac: RRR. No m/r/g. Normal S1, S2.  Pulm: CTAB, no wheezes, or crackles. Normal respiratory effort on RA.  Abd: Soft, non distended, No guarding or rebound. No hepatosplenomegaly. Epigastrium, LUQ and LLQ mildy TTP. RUQ and RLQ NTTP.  Skin: No jaundice or rashes observed. Lesion on left forearm which appears to be from previous unsuccessful IV placement. PICC line present.      Data   Recent Labs   Lab 04/01/19  0744 03/31/19  0514 03/30/19  1354  03/27/19  1931   WBC 3.4* 3.5* 3.8*   < > 4.7   HGB 11.6* 11.6* 13.8   < > 11.3*   MCV 88 89 89   < > 91    284 303   < > 273    140 140  --  136   POTASSIUM 4.0 3.8 4.3  --  5.2   CHLORIDE 110* 110* 109  --  107   CO2 22 21 20  --  21   BUN 10 12 15   < > 19   CR 1.45* 1.62* 1.87*   < > 2.13*   ANIONGAP 7 9 11  --  8   SHANKAR 8.2* 8.2* 8.9  --  8.5   * 114* 80  --  153*   ALBUMIN  --   --  3.4  --  2.8*   PROTTOTAL  --   --  7.6  --  6.5*   BILITOTAL  --   --  0.2  --  0.4   ALKPHOS  --   --  126  --  93   ALT  --   --  46  --  24   AST  --   --  57*  --  39   LIPASE  --   --  106  --  61*    < > = values in this interval not displayed.

## 2019-04-01 NOTE — PLAN OF CARE
Pt A&O x4. Boyfriend at bedside. Pt has LLE wound that is covered, non wt bearing, uses crutches. PICC running 100mL NS and intermittent vancomycin. Pt had 1 BM overnight, loose. Zofran given x1 for nausea with relief. Will continue to monitor and follow POC.

## 2019-04-01 NOTE — PROGRESS NOTES
This is a recent snapshot of the patient's [facility] medical record.  For current drug dose and complete information and questions, call [phone] or In Basket pool, fv home infusion ([id number])  CSN Number:  [id number]

## 2019-04-01 NOTE — PLAN OF CARE
Assumed Cares 1900    8pm medications given, bentyl and hyoscyamine given for cramping with some relief. PICC currently running vanco. Will continue to monitor overnight.

## 2019-04-01 NOTE — PROGRESS NOTES
Care Coordinator Progress Note    Admission Date/Time:  3/30/2019  Attending MD:  Xavi Alvarado MD    Data  Chart reviewed, discussed with interdisciplinary team.   Patient was admitted for:    Acute kidney injury (H)  Nausea and vomiting, intractability of vomiting not specified, unspecified vomiting type  Irritable bowel syndrome with both constipation and diarrhea.    Concerns with insurance coverage for discharge needs: None.  Current Living Situation: Patient lives with boyfriend  Support System: Supportive and Involved  Services Involved: Home Infusion  Transportation at Discharge: Family or friend will provide  Transportation to Medical Appointments:   - Not applicable  Barriers to Discharge: medical stability    Coordination of Care and Referrals: Provided patient/family with options for Home Infusion.        Assessment  Patient admitted for worsening periumbilical abdominal pain and 22 day history of alternating constipation and diarrhea with maroon colored stools. History of chronic constipation, gastroparesis, and recent left ankle MSSA osteomyelitis.   Met with patient and boyfriend to discuss discharge planning as MD team discussed discharging patient today. Patient currently on IV vanco for her osteomyelitis. Open to [facility] for both vanco and IV fluids; liaison notified of discharge and resumption order placed. Per patient request, asked [facility] for a pump for her IVF.   No further needs noted.      Plan  Anticipated Discharge Date:  Today  Anticipated Discharge Plan:  Home with resumption of home infusion    Madeleine Ramirez RN

## 2019-04-01 NOTE — PHARMACY-VANCOMYCIN DOSING SERVICE
Pharmacy Vancomycin Note  Date of Service 2019  Patient's  1995   23 year old, female    Indication: Osteomyelitis  Goal Trough Level: 15-20 mg/L  Current Vancomycin regimen:  1250 mg IV q8hrs    Current estimated CrCl = Estimated Creatinine Clearance: 56.5 mL/min (A) (based on SCr of 1.45 mg/dL (H)).    Creatinine for last 3 days  3/30/2019:  1:54 PM Creatinine 1.87 mg/dL  3/31/2019:  5:14 AM Creatinine 1.62 mg/dL  2019:  7:44 AM Creatinine 1.45 mg/dL    Recent Vancomycin Levels (past 3 days)  2019: 10:54 AM Vancomycin Level 47.3 mg/L    Vancomycin IV Administrations (past 72 hours)                   vancomycin (VANCOCIN) 2,000 mg in sodium chloride 0.9 % 500 mL intermittent infusion (mg) 2,000 mg New Bag 19 0439                Nephrotoxins and other renal medications (From now, onward)    None             Contrast Orders - past 72 hours (72h ago, onward)    None          Interpretation of levels and current regimen:  Trough level is  Supratherapeutic    Has serum creatinine changed > 50% in last 72 hours: No    Urine output:  unable to determine    Renal Function: Improving    Plan:  1.  HOLD further doses and check another level with am labs and redose as needed.  2.  Pharmacy will check trough levels as appropriate in 1-3 Days.    3. Serum creatinine levels will be ordered a minimum of twice weekly.      Thanks for the consult  Felipe Zimmerman, PharmJuan AD, BCPS        .

## 2019-04-02 ENCOUNTER — TELEPHONE (OUTPATIENT)
Dept: INFECTIOUS DISEASES | Facility: CLINIC | Age: 24
End: 2019-04-02

## 2019-04-02 ENCOUNTER — PATIENT OUTREACH (OUTPATIENT)
Dept: CARE COORDINATION | Facility: CLINIC | Age: 24
End: 2019-04-02

## 2019-04-02 ENCOUNTER — HOME INFUSION (PRE-WILLOW HOME INFUSION) (OUTPATIENT)
Dept: PHARMACY | Facility: CLINIC | Age: 24
End: 2019-04-02

## 2019-04-02 ENCOUNTER — OFFICE VISIT (OUTPATIENT)
Dept: PODIATRY | Facility: CLINIC | Age: 24
End: 2019-04-02
Payer: COMMERCIAL

## 2019-04-02 ENCOUNTER — TELEPHONE (OUTPATIENT)
Dept: CARE COORDINATION | Facility: CLINIC | Age: 24
End: 2019-04-02

## 2019-04-02 VITALS — RESPIRATION RATE: 16 BRPM | WEIGHT: 156.1 LBS | HEIGHT: 63 IN | BODY MASS INDEX: 27.66 KG/M2

## 2019-04-02 DIAGNOSIS — Z45.2 PICC (PERIPHERALLY INSERTED CENTRAL CATHETER) FLUSH: Primary | ICD-10-CM

## 2019-04-02 DIAGNOSIS — G89.29 CHRONIC ABDOMINAL PAIN: Primary | ICD-10-CM

## 2019-04-02 DIAGNOSIS — Z98.890 S/P FOOT SURGERY, LEFT: Primary | ICD-10-CM

## 2019-04-02 DIAGNOSIS — R10.9 CHRONIC ABDOMINAL PAIN: Primary | ICD-10-CM

## 2019-04-02 LAB
CREAT SERPL-MCNC: 1.5 MG/DL (ref 0.52–1.04)
GFR SERPL CREATININE-BSD FRML MDRD: 48 ML/MIN/{1.73_M2}
VANCOMYCIN SERPL-MCNC: 14.3 MG/L

## 2019-04-02 PROCEDURE — 99024 POSTOP FOLLOW-UP VISIT: CPT | Performed by: PODIATRIST

## 2019-04-02 PROCEDURE — 80202 ASSAY OF VANCOMYCIN: CPT | Performed by: INTERNAL MEDICINE

## 2019-04-02 PROCEDURE — 82565 ASSAY OF CREATININE: CPT | Performed by: INTERNAL MEDICINE

## 2019-04-02 RX ORDER — HEPARIN SODIUM,PORCINE 10 UNIT/ML
5 VIAL (ML) INTRAVENOUS ONCE
Status: DISCONTINUED | OUTPATIENT
Start: 2019-04-02 | End: 2019-05-07

## 2019-04-02 ASSESSMENT — MIFFLIN-ST. JEOR: SCORE: 1427.19

## 2019-04-02 NOTE — TELEPHONE ENCOUNTER
[facility] pharmacist called re ringing in ears, pressure  on-off for 1.5 week, on IV Vancomycin for MSSA osteomyelitis ( has allergies to PCN and cephalosporins )     - will change to daptomycin 6 mg/kg daily but will need to dose per renal function  - check CK prior to starting dapto     Mazin Roberts MD,M.Med.Sc.   Infectious Diseases  Pager: [phone]

## 2019-04-02 NOTE — TELEPHONE ENCOUNTER
Please see care coordination note for further follow up. Patient is requesting referral to dietician as she is having challenges coming up with meal plans to ensure both healthy healing and decrease constipation/bowel obstruction history and diarrhea. She will see you this week for hospital follow up. Declined your referral to IPC and wants to stay with you as PCP. Has community therapist and psychiatrist and while she is frustrated with her current medical situation she feels stable from a mental health perspective. I pended a dietician order. Delete if you decline to refer. Send back to me regardless and I'll follow up    Thanks    Teresa Cam RN  [facility]-Care Coordination  [facility]  [phone]

## 2019-04-02 NOTE — PATIENT INSTRUCTIONS
Thank you for choosing [facility]!    DR. CRISOSTOMO'S CLINIC LOCATIONS:   MONDAY - [place] TUESDAY - [place]   [address] [facility]  [address]   [place] [address] [place] [address]   [phone]       THURSDAY AM - [place] THURSDAY PM - [place]   [address]   [place] [address] [place], [place] [address] [phone]       FRIDAY AM - [place] SET UP SURGERY: [phone] [address] APPOINTMENTS: [phone]   [place] [address] BILLING QUESTIONS: [phone] FAX NUMBER: [phone]     Follow Up: 1 wk    BODY WEIGHT AND YOUR FEET  The following information is included in the after visit summary for all patients. Body weight can be a sensitive issue to discuss in clinic, but we think the following information is very important. Although we focus on the feet and ankles, we do support the overall health of our patients.     Many things can cause foot and ankle problems. Foot structure, activity level, foot mechanics and injuries are common causes of pain. One very important issue that often goes unmentioned, is body weight. Extra weight can cause increased stress on muscles, ligaments, bones and tendons. Sometimes just a few extra pounds is all it takes to put one over her/his threshold. Without reducing that stress, it can be difficult to alleviate pain. As Foot & Ankle specialists, our job is addressing the lower extremity problem and possible causes. Regarding extra body weight, we encourage patients to discuss diet and weight management plans with their primary care doctors. It is this team approach that gives you the best opportunity for pain relief and getting you back on your feet.      [facility] has a Comprehensive Weight Management Program. This program includes counseling, education, non-surgical and surgical approaches to weight loss. If you are interested in learning  more either talk to you primary care provider or call [phone].

## 2019-04-02 NOTE — PROGRESS NOTES
This is a recent snapshot of the patient's [facility] medical record.  For current drug dose and complete information and questions, call [phone] or In Basket pool, fv home infusion ([id number])  CSN Number:  [id number]

## 2019-04-02 NOTE — LETTER
[facility]ARE COORDINATION  [address]  [facility] [address]    April 2, 2019    Samara Oropeza  [address]   [place] [address]      Dear Samara,    I am a clinic care coordinator who works with EVI Cardona CNP at [facility]. I wanted to introduce myself and provide you with my contact information so that you can call me with questions or concerns about your health care. Below is a description of clinic care coordination and how I can further assist you.     The clinic care coordinator is a registered nurse and/or  who understand the health care system. The goal of clinic care coordination is to help you manage your health and improve access to the [facility] system in the most efficient manner. The registered nurse can assist you in meeting your health care goals by providing education, coordinating services, and strengthening the communication among your providers. The  can assist you with financial, behavioral, psychosocial, chemical dependency, counseling, and/or psychiatric resources.    Please feel free to contact me at [phone], with any questions or concerns. We at [facility] are focused on providing you with the highest-quality healthcare experience possible and that all starts with you.     Sincerely,     Teresa Cam RN  [facility]-Care Coordination  [facility]  [phone]  Enclosed: I have enclosed a copy of a 24 Hour Access Plan. This has helpful phone numbers for you to call when needed. Please keep this in an easy to access place to use as needed.

## 2019-04-02 NOTE — LETTER
"    4/2/2019         RE: Samara Oropeza  [address]  [place] [address]        Dear Colleague,    Thank you for referring your patient, Samara Oropeza, to the [facility]. Please see a copy of my visit note below.    Foot & Ankle Surgery  April 2, 2019    S:  Patient in today approx 3 months sp ankle scope with open ATFL repair and excision of sinus tarsi mass.  Pain levels elevated.  I last saw her 2/14/19, at which point she had developed a post-op infection.  She was put on PO abx, but without improvement, she went to the ER and was admitted to the [facility] . She states she was there for about 3 days without getting antibiotics, and eventually underwent I&D by the Ortho department for significant infection on 3/12/19.  I was in communication with the Ortho doc, and he informed me the ligament repair site was necrotic and had to be debrided.  She has noticed a significant increase in the instability of her ankle.  She indicates there was bone infection, and is currently getting 6 weeks IV abx via PICC.  She has an appt with ID coming up in the near future.     Resp 16   Ht 1.6 m (5' 3\")   Wt 70.8 kg (156 lb 1.6 oz)   LMP 03/09/2019   BMI 27.65 kg/m         ROS - positive for CC.  Patient denies current nausea, vomiting, chills, fevers, belly pain, calf pain, chest pain or SOB.  Complete remainder of ROS is otherwise neg.    PE - a few sutures are intact lateralL ankle with a few areas that are slow to heal.  However, no drainage, no erythema/edema noted.   Skin shows no trophic, color or temperature changes otherwise.  No calf redness, swelling or pain noted otherwise.    A/P - 23 year old yo patient approx 3 mo sp above procedure, approx 3 weeks sp I&D for severe infection  -IV abx per ID; anticipate 6 weeks  -sutures left intact, follow up 1 week for recheck  -discussed that, due to her instablity, she will need revision surgery.  We will repeat the MRI to further " assess for her bone infection, as well as to assess ligament status, after her IV abx are done.  We discussed my concerns with utilizing implants in the setting of previous infection.  -WBAT in walking boot    Follow up  -  1 week or sooner with acute issues      Body mass index is 27.65 kg/m .  Weight management plan: Patient was referred to their PCP to discuss a diet and exercise plan.      Andres Johnson DPM FACFAS FACFAOM  Podiatric Foot & Ankle Surgeon  [facility]  [phone]      Again, thank you for allowing me to participate in the care of your patient.        Sincerely,        Andres Johnson DPM, DPPB

## 2019-04-02 NOTE — LETTER
Health Care Home - Access Care Plan    About Me  Patient Name:  Samara aBh    YOB: 1995  Age:                             23 year old   Codei MRN:            [id number] Telephone Information:   Home Phone [phone]   Mobile [phone]       Address:    [address]  [place] [address] Email address:  [email or web address]      Emergency Contact(s)  Name Relationship Lgl Grd Work Phone Home Phone Mobile Phone   1. MARIXA CLARKE* Mother No  [phone]   2. MALGORZATA BAH Father No  [phone]   3. HOLLY CLARKE* Step parent No  [phone]   4. KENIA PERRY* Grandparent No  [phone]             Health Maintenance:      My Access Plan  Medical Emergency 911   Questions or concerns during clinic hours Primary Clinic Line, I will call the clinic directly: [facility], [place] - [phone]   24 Hour Appointment Line [phone] or  [phone] [phone] ([phone]) (toll free)   24 Hour Nurse Line [phone] (toll free)   Questions or concerns outside clinic hours 24 Hour Appointment Line, I will call the after-hours on-call line:   [facility] [phone] or [phone] ([phone]) (toll-free)   Preferred Urgent Care     Preferred Hospital     Preferred Pharmacy [facility] - [place], [place] - [address] Behavioral Health Crisis Line The National Suicide Prevention Lifeline at 1-572.581.2075 or 911     My Care Team Members  Patient Care Team       Relationship Specialty Notifications Start End    Sonja Abreu APRN CNP PCP - General Nurse Practitioner  11/3/17     Phone: [phone] Fax: [phone] [address] [facility] 700 [facility] [address]    Jennifer Acevedo MD MD Orthopedics  7/16/13     Phone: [phone] Fax: [phone]         4 [facility] [address]    Lilliana Miramontes, APRN CNP  Nurse Practitioner Nurse Practitioner  5/11/15     Phone: [phone] Fax: [phone]         [address] [address]    Cristy Russell, RN Nurse Coordinator Neurology Admissions 6/18/15     General Neurology    Phone: [phone] Pager: [phone] Fax: [phone]       Austen Marquez MD MD Rheumatology Admissions 9/16/15     Phone: [phone] Fax: [phone]         [address] [place] [address]    Umer Heaton MD MD Ophthalmology  1/11/16     Phone: [phone] Fax: [phone]         [address] [place] [address]    Suzy Harman MD MD Family Medicine - Sports Medicine  1/19/16     Phone: [phone] Fax: [phone]         [address] [place] [address]    Esau Elliott MD MD Dermatology  9/14/16     Phone: [phone] Fax: [phone]         [address] [place] [address]    Frederick Bender MD MD Physical Medicine and Rehab  4/4/17     Phone: [phone] Fax: [phone]         [address] [place] [address]    Vidya Bryson, RN Clinic Care Coordinator Physical Medicine and Rehab  4/4/17     Phone: [phone] Pager: [phone] Fax: [phone]        [facility] 420 [facility] 297 [facility] [address]    Marcelle Richardson, PT Physical Therapist Physical Medicine and Rehab  4/4/17     Phone: [phone] Fax: [phone]         [address] [address]    Cata Hurst, NP Nurse Practitioner Nurse Practitioner Psych/Mental Health  6/27/17     Phone: [phone] Fax: [phone]          [facility] [address] [facility] [address]    Sonja Abreu APRN CNP Assigned PCP   7/2/17     Phone: [phone] Fax: [phone] [address] [facility] 700 [facility] [address]    HUSSEIN Lynn MD MD Cardiology  2/27/18     Phone: [phone] Fax: [phone]         [address] [place] [address]  [facility] [address]    Maeve Purcell BSW Lead Care Coordinator Primary Care - CC Admissions 3/18/19     Phone: [phone]         Calli Cam, RN Clinic Care Coordinator Primary Care - CC Admissions 4/2/19     Phone: [phone] Fax: [phone]               My Medical and Care Information  Problem List   Patient Active Problem List   Diagnosis     Tics - Tourette syndrome     IBS (irritable bowel syndrome)     Allergic rhinitis     Generalized anxiety disorder     Knee pain     Concussion     Milk protein intolerance     Headaches due to old head injury     Behcet's disease (H)     Migraine     Spell of shaking     On colchicine therapy     Palpitations     Fibromyalgia     Rheumatoid arthritis of multiple sites without rheumatoid factor (H)     Raynaud's disease without gangrene     Chronic abdominal pain     Patellofemoral instability     PTSD (post-traumatic stress disorder)     Cervical dystonia     Acute left ankle pain     Cervical pain     Major depressive disorder, recurrent episode, moderate (H)     Chronic pain syndrome     Convulsions, unspecified convulsion type (H)     Transient alteration of awareness     Vitamin D deficiency     Mobile cecum     Spells of decreased attentiveness     Pelvic floor weakness     Somatic symptom disorder     Anxiety state     Aphthous ulcer     Cough     Dysthymic disorder     Gastroparesis     GERD (gastroesophageal reflux disease)     Displacement of lumbar intervertebral disc without myelopathy     Intestinal malabsorption     Iron deficiency associated with nonfamilial restless legs syndrome     Other specified symptom associated with female genital organs     Enthesopathy of hip region     Tourette's syndrome     Cellulitis     Acute abdominal pain      Current Medications and Allergies:  See printed Medication Report

## 2019-04-02 NOTE — PROGRESS NOTES
"Foot & Ankle Surgery  April 2, 2019    S:  Patient in today approx 3 months sp ankle scope with open ATFL repair and excision of sinus tarsi mass.  Pain levels elevated.  I last saw her 2/14/19, at which point she had developed a post-op infection.  She was put on PO abx, but without improvement, she went to the ER and was admitted to the . [facility]. She states she was there for about 3 days without getting antibiotics, and eventually underwent I&D by the Ortho department for significant infection on 3/12/19.  I was in communication with the Ortho doc, and he informed me the ligament repair site was necrotic and had to be debrided.  She has noticed a significant increase in the instability of her ankle.  She indicates there was bone infection, and is currently getting 6 weeks IV abx via PICC.  She has an appt with ID coming up in the near future.     Resp 16   Ht 1.6 m (5' 3\")   Wt 70.8 kg (156 lb 1.6 oz)   LMP 03/09/2019   BMI 27.65 kg/m        ROS - positive for CC.  Patient denies current nausea, vomiting, chills, fevers, belly pain, calf pain, chest pain or SOB.  Complete remainder of ROS is otherwise neg.    PE - a few sutures are intact lateralL ankle with a few areas that are slow to heal.  However, no drainage, no erythema/edema noted.   Skin shows no trophic, color or temperature changes otherwise.  No calf redness, swelling or pain noted otherwise.    A/P - 23 year old yo patient approx 3 mo sp above procedure, approx 3 weeks sp I&D for severe infection  -IV abx per ID; anticipate 6 weeks  -sutures left intact, follow up 1 week for recheck  -discussed that, due to her instablity, she will need revision surgery.  We will repeat the MRI to further assess for her bone infection, as well as to assess ligament status, after her IV abx are done.  We discussed my concerns with utilizing implants in the setting of previous infection.  -WBAT in walking boot    Follow up  -  1 week or sooner with acute " issues      Body mass index is 27.65 kg/m .  Weight management plan: Patient was referred to their PCP to discuss a diet and exercise plan.      Andres Johnson DPM FACFAS FACFAOM  Podiatric Foot & Ankle Surgeon  [facility]  [phone]

## 2019-04-02 NOTE — PROGRESS NOTES
Clinic Care Coordination Contact    Clinic Care Coordination Contact  OUTREACH    Referral Information:  Referral Source: IP Report         Chief Complaint   Patient presents with     Clinic Care Coordination - Post Hospital     RN        [place] Utilization: frequent ED/hospitalizations     Utilization    Last refreshed: 4/2/2019  8:55 AM:  Hospital Admissions 2           Last refreshed: 4/2/2019  8:55 AM:  ED Visits 3           Last refreshed: 4/2/2019  8:55 AM:  No Show Count (past year) 4              Current as of: 4/2/2019  8:55 AM              Clinical Concerns:  Current Medical Concerns:  Abdominal pain, bowel obstruction, acute anemia, diarrhea    Current Behavioral Concerns: depression stable per patient. Was referred to and accepted by [facility] for transfer of care from current PCP but patient doesn't think her focus right now is mental health and does not agree to the transfer    Education Provided to patient: balancing foods to promote healing but not cause weight loss and diarrhea      Health Maintenance Reviewed:    Clinical Pathway: None    Medication Management:  No concerns. Home infusion nurse coming this morning to resume help with IV vancomycin and IV fluid administration    Functional Status:  Bed or wheelchair confined:: No  Mobility Status: Independent w/Device(crutches temporarily)    Living Situation:  Current living arrangement:: I live in a private home  Type of residence:: Private home - stairs    Diet/Exercise/Sleep:       Transportation:           Psychosocial:  Sikh or spiritual beliefs that impact treatment:: No  Mental health DX:: Yes  Mental health DX how managed:: Medication, Psychiatrist  Mental health management concern (GOAL):: No  Informal Support system:: Significant other     Financial/Insurance:      Working with SW on disability     Resources and Interventions:  Current Resources:    ;   Community Resources: OP Mental Health, Infusion Services     Equipment Currently  Used at Home: crutches    Advance Care Plan/Directive  Advanced Care Plans/Directives on file:: No    Referrals Placed: Disability Linkage line(sent information to patient )     Goals:   Goals        General    #1 Financial Wellbeing (pt-stated)     Notes - Note created  3/28/2019  2:58 PM by Maeve Purcell BSW    Goal Statement: I need help applying for disability    Measure of Success: Pt will apply for disability   Supportive Steps to Achieve: care coordination   Barriers: unable to navigate on own  Strengths: seeking help  Date to Achieve By: 5/1/19   Patient expressed understanding of goal: yes                  Patient/Caregiver understanding: good    Outreach Frequency: monthly  Future Appointments              Tomorrow Micha Glover MD [facility] Sports Medicine, [facility]    Tomorrow Rosangela Hollingsworth, [facility] Integrated Primary Care MTM, [facility]    Tomorrow  [facility] DRAW [facility] Masonic Lab Draw, [facility]    In 1 week Andres Johnson DPM [facility]    In 1 week Gerda Mehta MD [facility], Infectious Disease, [place]    In 1 week  [facility] DRAW [facility] Masonic Lab Draw, [facility]    In 1 week Ernestina Patel [facility] [place]    In 2 weeks  [facility] DRAW [facility] Masonic Lab Draw, [facility]    In 3 weeks Ernestina Patel [facility] [place]    In 4 weeks Alejandrina Hernandez MD [facility] Physical Medicine and Rehabilitation, [facility]    In 1 month Ernestina Patel [facility] [place]    In 1 month Ernestina Patel [facility] [place]    In 3 months Austen Marquez MD [facility]    In 7 months Joseph De La Torre [facility]          Plan: will reach out to PCP as patient is asking for referral to dietician. She  agrees to schedule follow up appointment with PCP this week. Will attempt to meet with patient at appointment. She agrees with plan.

## 2019-04-03 ENCOUNTER — OFFICE VISIT (OUTPATIENT)
Dept: PHARMACY | Facility: CLINIC | Age: 24
End: 2019-04-03
Payer: COMMERCIAL

## 2019-04-03 ENCOUNTER — HOME INFUSION (PRE-WILLOW HOME INFUSION) (OUTPATIENT)
Dept: PHARMACY | Facility: CLINIC | Age: 24
End: 2019-04-03

## 2019-04-03 ENCOUNTER — INFUSION THERAPY VISIT (OUTPATIENT)
Dept: INFUSION THERAPY | Facility: CLINIC | Age: 24
End: 2019-04-03
Attending: INTERNAL MEDICINE
Payer: COMMERCIAL

## 2019-04-03 VITALS
DIASTOLIC BLOOD PRESSURE: 93 MMHG | RESPIRATION RATE: 16 BRPM | HEART RATE: 71 BPM | OXYGEN SATURATION: 100 % | SYSTOLIC BLOOD PRESSURE: 150 MMHG

## 2019-04-03 DIAGNOSIS — L03.116 CELLULITIS OF LEFT LOWER EXTREMITY: Primary | ICD-10-CM

## 2019-04-03 DIAGNOSIS — M86.9 OSTEOMYELITIS, UNSPECIFIED SITE, UNSPECIFIED TYPE (H): ICD-10-CM

## 2019-04-03 DIAGNOSIS — R11.2 NON-INTRACTABLE VOMITING WITH NAUSEA, UNSPECIFIED VOMITING TYPE: ICD-10-CM

## 2019-04-03 DIAGNOSIS — F95.9 TIC: ICD-10-CM

## 2019-04-03 DIAGNOSIS — F43.10 PTSD (POST-TRAUMATIC STRESS DISORDER): ICD-10-CM

## 2019-04-03 DIAGNOSIS — K59.04 CHRONIC IDIOPATHIC CONSTIPATION: ICD-10-CM

## 2019-04-03 DIAGNOSIS — F33.1 MAJOR DEPRESSIVE DISORDER, RECURRENT EPISODE, MODERATE (H): ICD-10-CM

## 2019-04-03 DIAGNOSIS — Z30.09 BIRTH CONTROL COUNSELING: ICD-10-CM

## 2019-04-03 DIAGNOSIS — G43.711 INTRACTABLE CHRONIC MIGRAINE WITHOUT AURA AND WITH STATUS MIGRAINOSUS: ICD-10-CM

## 2019-04-03 DIAGNOSIS — M35.2 BEHCET'S DISEASE (H): Primary | ICD-10-CM

## 2019-04-03 DIAGNOSIS — N39.0 RECURRENT UTI: ICD-10-CM

## 2019-04-03 DIAGNOSIS — K58.2 IRRITABLE BOWEL SYNDROME WITH BOTH CONSTIPATION AND DIARRHEA: ICD-10-CM

## 2019-04-03 LAB — CK SERPL-CCNC: 40 U/L (ref 30–225)

## 2019-04-03 PROCEDURE — 25000128 H RX IP 250 OP 636: Mod: JW | Performed by: INTERNAL MEDICINE

## 2019-04-03 PROCEDURE — 99607 MTMS BY PHARM ADDL 15 MIN: CPT | Performed by: PHARMACIST

## 2019-04-03 PROCEDURE — 99605 MTMS BY PHARM NP 15 MIN: CPT | Performed by: PHARMACIST

## 2019-04-03 PROCEDURE — 96365 THER/PROPH/DIAG IV INF INIT: CPT

## 2019-04-03 PROCEDURE — 82550 ASSAY OF CK (CPK): CPT | Performed by: INTERNAL MEDICINE

## 2019-04-03 PROCEDURE — 25800030 ZZH RX IP 258 OP 636: Performed by: INTERNAL MEDICINE

## 2019-04-03 RX ADMIN — DAPTOMYCIN 400 MG: 500 INJECTION, POWDER, LYOPHILIZED, FOR SOLUTION INTRAVENOUS at 15:07

## 2019-04-03 NOTE — PROGRESS NOTES
Here for first dapto rx infusion. CK drawn from picc. bp elevated.  Pt waited 30 min after infusion complete, states she feels fine, no sob, no itching. Knows when she is reacting to things.

## 2019-04-03 NOTE — PROGRESS NOTES
"SUBJECTIVE/OBJECTIVE:                Samara Oropeza is a 23 year old female coming in for a transitions of care visit.  She was discharged from [facility] on 4/1/18 for     Samara Oropeza is a 23 year old female with PMH significant for chronic constipation, gastroparesis, and recent left ankle MSSA osteomyelitis on oral clindamycin (now discontinued) and IV vancomycin admitted on 3/30/2019 due to 7 day history of worsening periumbilical abdominal pain and 22 day hx of alternating constipation and diarrhea with maroon colored stools and abdominal CT 3 days ago concerning for sigmoid stricture and low grade obstruction.    Accompanied by her boyfriend Avi.  Pt arrived in a wheelchair and wearing a boot on her left foot.    Chief Complaint: \"I'd really like to restart colchicine. It's the only treatment I have for my Bechet's\"    Allergies/ADRs: Reviewed in Epic - numerous.  Tobacco: No tobacco use   Alcohol: not currently using  Caffeine: no caffeine  Activity: none recently  PMH: Reviewed in [facility]    Specialists:   Physical medicine-Dr Hernandez (botox)  Rheumatology-Dr Marquez  Psychologist - Ernestina Patel  Podiatry - [facility]  Neurology - Joseph De La Torre    Medication Adherence/Access:  Patient takes medications directly from bottles.  Patient takes medications 2 time(s) per day.   Per patient, misses medication 2 times per week, usually AM.     osteomyelitis: left ankle MSSA osteomyelitis.  Vancomycin discontinued with pt report of ears ringing. She has an appt with the infusion center today to start treatment with daptomycin.  Per chart review, CK recommended to be drawn prior to starting dapto. She is not taking any oxycodone for pain control, doesn't like the side effects.     Bechet's: holding colchicine as recommended at discharge because of JOSE JUAN.  She would like to restart, starting to notice ulcerations.  \"This is my only medicine for Bechet's\".  Had also taken Otezla in the past but had to hold because " "of GI side effects and increased infections.   Works with Rheumatology, appt scheduled in July for follow-up.     Migraine: receives botox injections with physical medicine and finds them helpful.  She is scheduled for another injection.   Per chart review, prior to starting botox, had daily headaches and has since reduced to weekly.     Anxiety/nightmares: not currently taking any medication. She has periodic chest pain, at least once a day and takes aspirin 81mg PRN.  Doesn't find it effective.  Also tried albuterol for the same pain and sometimes finds it helpful. Resolves with time, says nobody knows why she gets the pain. She had taken Ativan 0.5-1mg in the past PRN which was effective but had been discontinued. She would like to restart it if possible.  Hasn't started gabapentin, worried about side effects.   Not taking cyproheptadine currently, states if she is able to get the medication consistently that it is very helpful for reducing nightmares.   Concerned about taking \"depression\" medicine because her brother takes several antidepressants and \"he is a zombie\".    Works with therapist in [facility] Ernestina Patel  Per psychiatry consult 6/1/18 ([facility]):  Previous medication trials include but not limited to:  Vistaril/Atarax (hydroxyzine)  Intuniv (guanfacine)  Elavil (amitriptyline)   Ativan (lorazepam)  Buspar (buspirone)   Neurontin (gabapentin)   Melatonin   Minipress (prazosin)   Cyproheptadine   Continue to monitor nightmares and may augment with sleeping medication such as Desyrel/Olepto (trazodone)  mg by mouth daily at bedtime or Remeron (mirtazapine) 15-45 mg by mouth daily at bedtime. Cymbalta (duloxetine)  mg daily may also be an option for anxiety, depression, and chronic pain. Patient has been hesitant since our work together about adding a medication to treat ongoing depression and anxiety. Patient is only taking low dose of Elavil (amitriptyline) for pain. Patient has not met with her pain " "specialists recently. May consider increasing dosing of Elavil (amitriptyline) but I defer to those specialists. Patient notes today concerns about tics worsening. None noted today. She would like to adjust her Intuniv (guanfacine) but would like to do a very slow taper and consider seeing neurology to help with this. Will keep for today.     tourettes: Currently taking guanfacine 3mg BID but doesn't think it's effective anymore, had been helpful when she initially started it.     constipation: currently taking bisacodyl 10mg BID, Miralax 17g x4 capfuls daily, lactulose 30ML 1-2 times daily (has been bothering her stomach), senna 4-8 tablets daily.  Has not been able to get Linzess, no longer working with GI. Had been very helpful in the past.  Diclyclomine takes a few times a week with meals when she has pain/bloating but doesn't seem to help much. Also takes hyoscyamine 2-3 tablets PRN, takes more often because seems to be more helpful.     Recurrent UTI: holding nitrofurantoin she typically takes for UTI prophylaxis while she is receiving abx for osteomyelitis.       Nausea: currently taking ondansetron 8mg 1-3 times daily. Effective but difficulty getting medication through insurance. Likes ODT formulation better, more effective. Typically takes sucralfate 10ML QID for nausea but not currently taking it, unclear reason.     OCP: holding Ortho-Cyclen as instructed while on abx.  She is not currently sexually active.  Vaginal bleeding has resolved.    Out of time today to complete comprehensive medication assessment - will defer remainder to f/u.      Today's Vitals: LMP 03/09/2019    BP Readings from Last 1 Encounters:   04/03/19 (!) 150/93     Pulse Readings from Last 1 Encounters:   04/03/19 71     Wt Readings from Last 1 Encounters:   04/02/19 156 lb 1.6 oz (70.8 kg)     Ht Readings from Last 1 Encounters:   04/02/19 5' 3\" (1.6 m)     Estimated body mass index is 27.65 kg/m  as calculated from the following:   " " Height as of 4/2/19: 5' 3\" (1.6 m).    Weight as of 4/2/19: 156 lb 1.6 oz (70.8 kg).    Temp Readings from Last 1 Encounters:   04/01/19 97.9  F (36.6  C) (Oral)        ASSESSMENT:                 Current medications were reviewed today.      Medication Adherence: fair, needs improvement - see below    osteomyelitis: improved, informed pt of request from ID to have CK checked prior to starting daptomycin, which she can do with infusion center. Plan to followup on this just after the visit today.     Bechet's: needs improvement. Consider restarting colchicine, can dose adjust until creatinine improves.  Will contact rheumatology for recommendations.     migraine: improved, pt to follow-up for botox as scheduled.    Anxiety/nightmares: needs improvement. Agree with patient it would be appropriate to give her a small quantity of lorazepam per month to use PRN, no contraindications.  Discussed risks of addiction and guidelines to use SSRI or alternative for daily use to control anxiety symptoms. She will consider and further discuss at follow-up. Due to her concerns of side effects, recommend starting with low doses and increasing slowly.  She will continue with therapy as scheduled.    Tourettes: needs improvement. Out of time today to further discuss, will review at follow-up.     Constipation: needs improvement. Linzess is indicated, will contact PCP for OK to refill and back off on her laxative regimen.    Recurrent UTI: improved, agree with holding nitrofurantoin while on other abx and will review appropriateness of ongoing therapy with patient at follow-up.     Nausea: improved with ondansetron.  Will continue and work on available of sucralfate and ODT ondansetron as needed    OCP: encouraged pt to use alternate forms of birth control while off OCP.      PLAN:                  Post Discharge Medication Reconciliation Status: discharge medications reconciled and changed, per note/orders (see AVS).    Patient was " called with the following medication changes:  1. Restart colchicine 0.3mg daily - OK per Epic message from rheumatologist  2. Restart Linzess - OK per PCP verbal approval      3. Consider restarting lorazepam 0.5mg daily PRN with max 10-15 tablets per month - will send to PCP to address at follow-up appointment    I spent 60 minutes with this patient today. All changes were made via collaborative practice agreement with Sonja Abreu. A copy of the visit note was provided to the patient's primary care provider.    Will follow up in 1 month.    The patient was given a summary of these recommendations as an after visit summary.    Rosangela Hollingsworth, PharmD, BCACP

## 2019-04-03 NOTE — PROGRESS NOTES
This is a recent snapshot of the patient's [facility] medical record.  For current drug dose and complete information and questions, call [phone] or In Basket pool, fv home infusion ([id number])  CSN Number:  [id number]

## 2019-04-03 NOTE — PATIENT INSTRUCTIONS
Recommendations from today's MTM visit:                                                    MTM (medication therapy management) is a service provided by a clinical pharmacist designed to help you get the most of out of your medicines.   Today we reviewed what your medicines are for, how to know if they are working, that your medicines are safe and how to make your medicine regimen as easy as possible.     1. I will look into some the questions we have about your medicines -- when to restart colchicine, can we get ondansetron ODT dissolvable, can we restart Linzess, can we restart Ativan.  I will send you a MyChart when I hear back    2. Move up your appointment with Dr Marquez    3. Before you start daptomycin, was recommended to have CK checked - please talk to infusion about this    Next MTM visit: 1 month    To schedule another MTM appointment, please call the clinic directly or you may call the MTM scheduling line at [phone] or toll-free at [phone].     My Clinical Pharmacist's contact information:                                                      It was a pleasure talking with you today!  Please feel free to contact me with any questions or concerns you have.      Rosangela Hollingsworth, PharmD, [facility]   [phone]    You may receive a survey about the MTM services you received by email and/or US Mail.  I would appreciate your feedback to help me serve you better in the future. Your comments will be anonymous.

## 2019-04-04 ENCOUNTER — HOME INFUSION (PRE-WILLOW HOME INFUSION) (OUTPATIENT)
Dept: PHARMACY | Facility: CLINIC | Age: 24
End: 2019-04-04

## 2019-04-04 ENCOUNTER — MEDICAL CORRESPONDENCE (OUTPATIENT)
Dept: HEALTH INFORMATION MANAGEMENT | Facility: CLINIC | Age: 24
End: 2019-04-04

## 2019-04-04 LAB
AST SERPL W P-5'-P-CCNC: 39 U/L (ref 0–45)
BASOPHILS # BLD AUTO: 0 10E9/L (ref 0–0.2)
BASOPHILS NFR BLD AUTO: 0.9 %
BUN SERPL-MCNC: 9 MG/DL (ref 7–30)
CK SERPL-CCNC: NORMAL U/L (ref 30–225)
CREAT SERPL-MCNC: 1.3 MG/DL (ref 0.52–1.04)
CRP SERPL-MCNC: 8 MG/L (ref 0–8)
DIFFERENTIAL METHOD BLD: ABNORMAL
EOSINOPHIL # BLD AUTO: 0.2 10E9/L (ref 0–0.7)
EOSINOPHIL NFR BLD AUTO: 4.5 %
ERYTHROCYTE [DISTWIDTH] IN BLOOD BY AUTOMATED COUNT: 12.1 % (ref 10–15)
ERYTHROCYTE [SEDIMENTATION RATE] IN BLOOD BY WESTERGREN METHOD: 19 MM/H (ref 0–20)
GFR SERPL CREATININE-BSD FRML MDRD: 57 ML/MIN/{1.73_M2}
HCT VFR BLD AUTO: 31.5 % (ref 35–47)
HGB BLD-MCNC: 10.5 G/DL (ref 11.7–15.7)
IMM GRANULOCYTES # BLD: 0 10E9/L (ref 0–0.4)
IMM GRANULOCYTES NFR BLD: 0.2 %
LYMPHOCYTES # BLD AUTO: 1.7 10E9/L (ref 0.8–5.3)
LYMPHOCYTES NFR BLD AUTO: 35.4 %
MCH RBC QN AUTO: 28.5 PG (ref 26.5–33)
MCHC RBC AUTO-ENTMCNC: 33.3 G/DL (ref 31.5–36.5)
MCV RBC AUTO: 86 FL (ref 78–100)
MONOCYTES # BLD AUTO: 0.3 10E9/L (ref 0–1.3)
MONOCYTES NFR BLD AUTO: 5.8 %
NEUTROPHILS # BLD AUTO: 2.5 10E9/L (ref 1.6–8.3)
NEUTROPHILS NFR BLD AUTO: 53.2 %
NRBC # BLD AUTO: 0 10*3/UL
NRBC BLD AUTO-RTO: 0 /100
PLATELET # BLD AUTO: 241 10E9/L (ref 150–450)
RBC # BLD AUTO: 3.68 10E12/L (ref 3.8–5.2)
WBC # BLD AUTO: 4.7 10E9/L (ref 4–11)

## 2019-04-04 PROCEDURE — 84520 ASSAY OF UREA NITROGEN: CPT | Performed by: INTERNAL MEDICINE

## 2019-04-04 PROCEDURE — 84450 TRANSFERASE (AST) (SGOT): CPT | Performed by: INTERNAL MEDICINE

## 2019-04-04 PROCEDURE — 86140 C-REACTIVE PROTEIN: CPT | Performed by: INTERNAL MEDICINE

## 2019-04-04 PROCEDURE — 85025 COMPLETE CBC W/AUTO DIFF WBC: CPT | Performed by: INTERNAL MEDICINE

## 2019-04-04 PROCEDURE — 85652 RBC SED RATE AUTOMATED: CPT | Performed by: INTERNAL MEDICINE

## 2019-04-04 PROCEDURE — 82565 ASSAY OF CREATININE: CPT | Performed by: INTERNAL MEDICINE

## 2019-04-04 PROCEDURE — 82550 ASSAY OF CK (CPK): CPT | Performed by: INTERNAL MEDICINE

## 2019-04-04 RX ORDER — COLCHICINE 0.6 MG/1
0.6 TABLET ORAL 2 TIMES DAILY
Status: ON HOLD | COMMUNITY
Start: 2019-04-04 | End: 2019-05-07

## 2019-04-04 NOTE — PROGRESS NOTES
This is a recent snapshot of the patient's [facility] medical record.  For current drug dose and complete information and questions, call [phone] or In Basket pool, fv home infusion ([id number])  CSN Number:  [id number]

## 2019-04-04 NOTE — PROGRESS NOTES
Per Epic message:    Hi,   Thank you for this.   I recommend resuming colchicine at 0.3mg per day.   Once creatinine normalizes, colchicine dose can be increased to her regular dose.   Would you be able to order these?   I will try to see her sooner depending on cancellations.   Thanks   Austen Marquez MD        Previous Messages      ----- Message -----   From: Rosangela Hollingsworth, [facility]   Sent: 4/4/2019   9:42 AM   To: MD Phu Morales   I am a clinical pharmacist with this patient's primary care and met [place] for the first time yesterday to review her medications post hospitalization.     She had JOSE JUAN while hospitalized and colchicine was held.  She would very much like to restart colchicine for bechets  - SCr is slowly improving but still not at baseline.  Vanco was recently discontinued and switched to daptomycin.  Is there a SCr at which you would be comfortable having her restart colchicine or could we restart at a lower dose until her Cr improves? (baseline ~0.6,  Peak at 2.1 during hospitalization)   Creatinine        Date                     Value               Ref Range           Status                04/02/2019               1.50 (H)            0.52 - 1.04 mg*     Final             ----------   Your next follow-up is scheduled in July but she is looking into moving up this appointment with you to review changes with recent hospitalizations.     Thanks for your time,   Rosangela Hollingsworth, PharmD, [facility]   [phone]

## 2019-04-05 ENCOUNTER — HOME INFUSION (PRE-WILLOW HOME INFUSION) (OUTPATIENT)
Dept: PHARMACY | Facility: CLINIC | Age: 24
End: 2019-04-05

## 2019-04-05 NOTE — PROGRESS NOTES
SUBJECTIVE:   Samara Oropeza is a 23 year old female who presents to clinic today for the following health issues:          Hospital Follow-up Visit:    Hospital/Nursing Home/IP Rehab Facility: [facility]  Date of Admission: 03/30/2019  Date of Discharge: 04/01/2019  Reason(s) for Admission: Acute Kidney Injury, IBS with both constipation and diarrhea            Problems taking medications regularly:  None       Medication changes since discharge: None       Problems adhering to non-medication therapy:  None    Summary of hospitalization:  [facility] discharge summary reviewed  Diagnostic Tests/Treatments reviewed.  Follow up needed: none  Other Healthcare Providers Involved in Patient s Care:         Homecare, Specialist appointment - upcoming and MTM  Update since discharge: stable.     Post Discharge Medication Reconciliation: discharge medications reconciled, continue medications without change.  Plan of care communicated with patient and family     Coding guidelines for this visit:  Type of Medical   Decision Making Face-to-Face Visit       within 7 Days of discharge Face-to-Face Visit        within 14 days of discharge   Moderate Complexity [id number]   High Complexity [id number]          -Has nausea with eating and drinking. She is now on constant fluids, and this seems to help. It helps if she drinks slowly. Eats a meal about once per day. Would like to try a different nausea medication- big zofran pills hard to swallow and make nausea worse.   Ongoing anxiety symptoms- discussed with MTM last week. Used Ativan in the past rarely as needed for anxiety issues. Continues to experience chest pain and bowel symptoms she associates with anxiety. Anxiety has been worse lately due to family pressure and drama with recent hospitalization. Ongoing panic symptoms.  -------------------------------------    Problem list and histories reviewed & adjusted, as indicated.  Additional  history: as documented    Patient Active Problem List   Diagnosis     Tics - Tourette syndrome     IBS (irritable bowel syndrome)     Allergic rhinitis     Generalized anxiety disorder     Knee pain     Concussion     Milk protein intolerance     Headaches due to old head injury     Behcet's disease (H)     Migraine     Spell of shaking     On colchicine therapy     Palpitations     Fibromyalgia     Rheumatoid arthritis of multiple sites without rheumatoid factor (H)     Raynaud's disease without gangrene     Chronic abdominal pain     Patellofemoral instability     PTSD (post-traumatic stress disorder)     Cervical dystonia     Acute left ankle pain     Cervical pain     Major depressive disorder, recurrent episode, moderate (H)     Chronic pain syndrome     Convulsions, unspecified convulsion type (H)     Transient alteration of awareness     Vitamin D deficiency     Mobile cecum     Spells of decreased attentiveness     Pelvic floor weakness     Somatic symptom disorder     Anxiety state     Aphthous ulcer     Cough     Dysthymic disorder     Gastroparesis     GERD (gastroesophageal reflux disease)     Displacement of lumbar intervertebral disc without myelopathy     Intestinal malabsorption     Iron deficiency associated with nonfamilial restless legs syndrome     Other specified symptom associated with female genital organs     Enthesopathy of hip region     Tourette's syndrome     Cellulitis     Acute abdominal pain     Past Surgical History:   Procedure Laterality Date     ARTHROSCOPY ANKLE, REPAIR LIGAMENT Left 1/2/2019    Procedure: Ankle arthroscopy and sinus tarsi evacuation, ligament repair, left lower extremity;  Surgeon: Andres Johnson DPM;  Location: [facility] OR     ARTHROSCOPY KNEE WITH PATELLAR REALIGNMENT  7/25/2013    Procedure: ARTHROSCOPY KNEE WITH PATELLAR REALIGNMENT;  Left Knee Arthroscopy, Medial Patellofemoral Ligament Reconstruction with Allograft  ;  Surgeon: Jennifer Acevedo MD;   Location: [facility] OR     COLONOSCOPY  2015     DENTAL SURGERY  1996    Teeth removal     ENDOSCOPY UPPER, COLONOSCOPY, COMBINED  2005     HC ESOPH/GAS REFLUX TEST W NASAL IMPED >1 HR N/A 2/15/2017    Procedure: ESOPHAGEAL IMPEDENCE FUNCTION TEST WITH 24 HOUR PH GREATER THAN 1 HOUR;  Surgeon: Timothy Matta MD;  Location: [facility]     IR PICC PLACEMENT > 5 YRS OF AGE  3/13/2019     IRRIGATION AND DEBRIDEMENT FOOT, COMBINED Left 3/12/2019    Procedure: COMBINED IRRIGATION AND DEBRIDEMENT LEFT ANKLE;  Surgeon: Micha Glover MD;  Location: [facility] OR       Social History     Tobacco Use     Smoking status: Never Smoker     Smokeless tobacco: Never Used   Substance Use Topics     Alcohol use: Yes     Alcohol/week: 0.6 oz     Types: 1 Standard drinks or equivalent per week     Comment: rarely     Family History   Problem Relation Age of Onset     Depression Mother      Neurologic Disorder Mother         Migraines, take imitrex injection.  Also in maternal grandmother.       Alcohol/Drug Father      Hypertension Father      Depression Father      Osteoarthritis Father      Cardiovascular Maternal Grandmother      Depression Maternal Grandmother      Hypertension Maternal Grandmother      Alzheimer Disease Maternal Grandmother      Cardiovascular Maternal Grandfather      Hypertension Maternal Grandfather      Depression Maternal Grandfather      Alcohol/Drug Maternal Grandfather      Cardiovascular Paternal Grandmother      Hypertension Paternal Grandmother      Cardiovascular Paternal Grandfather      Hypertension Paternal Grandfather      Glaucoma No family hx of      Macular Degeneration No family hx of            Reviewed and updated as needed this visit by clinical staff       Reviewed and updated as needed this visit by Provider         ROS:  Constitutional, HEENT, cardiovascular, pulmonary, gi and gu systems are negative, except as otherwise noted.    OBJECTIVE:     /88   Pulse 85   Temp 98.9  F  (37.2  C) (Temporal)   Resp 14   LMP 03/09/2019   SpO2 100%   There is no height or weight on file to calculate BMI.   GENERAL: healthy, alert and no distress  NECK: no adenopathy, no asymmetry, masses, or scars and thyroid normal to palpation  RESP: lungs clear to auscultation - no rales, rhonchi or wheezes  CV: regular rate and rhythm, normal S1 S2, no S3 or S4, no murmur, click or rub, no peripheral edema and peripheral pulses strong  ABDOMEN: soft, nontender, no hepatosplenomegaly, no masses and bowel sounds normal  MS: left lower leg in boot  PSYCH: mentation appears normal, affect normal/bright    Diagnostic Test Results:  Results for orders placed or performed in visit on 04/01/19 (from the past 24 hour(s))   CBC with platelets differential   Result Value Ref Range    WBC 6.1 4.0 - 11.0 10e9/L    RBC Count 3.30 (L) 3.8 - 5.2 10e12/L    Hemoglobin 9.5 (L) 11.7 - 15.7 g/dL    Hematocrit 28.2 (L) 35.0 - 47.0 %    MCV 86 78 - 100 fl    MCH 28.8 26.5 - 33.0 pg    MCHC 33.7 31.5 - 36.5 g/dL    RDW 12.1 10.0 - 15.0 %    Platelet Count 225 150 - 450 10e9/L    Diff Method Automated Method     % Neutrophils 65.2 %    % Lymphocytes 26.9 %    % Monocytes 4.4 %    % Eosinophils 2.6 %    % Basophils 0.7 %    % Immature Granulocytes 0.2 %    Nucleated RBCs 0 0 /100    Absolute Neutrophil 4.0 1.6 - 8.3 10e9/L    Absolute Lymphocytes 1.7 0.8 - 5.3 10e9/L    Absolute Monocytes 0.3 0.0 - 1.3 10e9/L    Absolute Eosinophils 0.2 0.0 - 0.7 10e9/L    Absolute Basophils 0.0 0.0 - 0.2 10e9/L    Abs Immature Granulocytes 0.0 0 - 0.4 10e9/L    Absolute Nucleated RBC 0.0      *Note: Due to a large number of results and/or encounters for the requested time period, some results have not been displayed. A complete set of results can be found in Results Review.       ASSESSMENT/PLAN:     Problem List Items Addressed This Visit     IBS (irritable bowel syndrome)    Relevant Medications    lactulose 20 GM/30ML SOLN    norgestimate-ethinyl  estradiol (ORTHO-CYCLEN/SPRINTEC) 0.25-35 MG-MCG tablet    hyoscyamine (ANASPAZ/LEVSIN) 0.125 MG tablet    dicyclomine (BENTYL) 20 MG tablet    ondansetron (ZOFRAN-ODT) 8 MG ODT tab    Fibromyalgia    Relevant Medications    amitriptyline (ELAVIL) 25 MG tablet      Other Visit Diagnoses     Acute kidney injury (H)    -  Primary    Relevant Medications    ondansetron (ZOFRAN-ODT) 8 MG ODT tab    Other Relevant Orders    **Comprehensive metabolic panel FUTURE 1yr    Constipation, unspecified constipation type        Relevant Medications    lactulose 20 GM/30ML SOLN    ondansetron (ZOFRAN-ODT) 8 MG ODT tab    TAHIR (generalized anxiety disorder)        Relevant Medications    LORazepam (ATIVAN) 0.5 MG tablet    citalopram (CELEXA) 10 MG tablet    amitriptyline (ELAVIL) 25 MG tablet    Pruritus        Relevant Medications    amitriptyline (ELAVIL) 25 MG tablet    Encounter for contraceptive management, unspecified type        Relevant Medications    norgestimate-ethinyl estradiol (ORTHO-CYCLEN/SPRINTEC) 0.25-35 MG-MCG tablet    Abdominal pain, generalized        Relevant Medications    hyoscyamine (ANASPAZ/LEVSIN) 0.125 MG tablet    Other Relevant Orders    **Comprehensive metabolic panel FUTURE 1yr    **CBC with platelets FUTURE 1yr    Abdominal cramping        Relevant Medications    dicyclomine (BENTYL) 20 MG tablet    Other Relevant Orders    **Comprehensive metabolic panel FUTURE 1yr    **CBC with platelets FUTURE 1yr    Chronic idiopathic constipation        Relevant Medications    lactulose 20 GM/30ML SOLN    Nausea        Relevant Medications    ondansetron (ZOFRAN-ODT) 8 MG ODT tab         -start Citalopram for anxiety symptoms and ativan as needed; #10 for one month; follow up 2-3 weeks for re-check  -switch zofran  -recommended follow up with MTM  EVI Cardona CNP  [facility] [place]  Please note greater than 50% of this 30 minute appointment were spent in face to face counseling with the  patient of the issues described above in the history of present illness and in the plan, including addressing medications

## 2019-04-05 NOTE — PROGRESS NOTES
This is a recent snapshot of the patient's [facility] medical record.  For current drug dose and complete information and questions, call [phone] or In Basket pool, fv home infusion ([id number])  CSN Number:  [id number]

## 2019-04-05 NOTE — TELEPHONE ENCOUNTER
Writer reached pt and pt stated that she sees Sonja Arbeu on Monday 4/8/19 @ 3:30 and is still planning on talking about the referral. Pt requested a call back on Tuesday 4/9/19 to check in.    Shara Webb  [facility]

## 2019-04-07 ENCOUNTER — HOME INFUSION (PRE-WILLOW HOME INFUSION) (OUTPATIENT)
Dept: PHARMACY | Facility: CLINIC | Age: 24
End: 2019-04-07

## 2019-04-08 ENCOUNTER — OFFICE VISIT (OUTPATIENT)
Dept: FAMILY MEDICINE | Facility: CLINIC | Age: 24
End: 2019-04-08
Payer: COMMERCIAL

## 2019-04-08 VITALS
HEART RATE: 85 BPM | RESPIRATION RATE: 14 BRPM | OXYGEN SATURATION: 100 % | DIASTOLIC BLOOD PRESSURE: 88 MMHG | SYSTOLIC BLOOD PRESSURE: 130 MMHG | TEMPERATURE: 98.9 F

## 2019-04-08 DIAGNOSIS — Z30.9 ENCOUNTER FOR CONTRACEPTIVE MANAGEMENT, UNSPECIFIED TYPE: ICD-10-CM

## 2019-04-08 DIAGNOSIS — R10.84 ABDOMINAL PAIN, GENERALIZED: ICD-10-CM

## 2019-04-08 DIAGNOSIS — L29.9 PRURITUS: ICD-10-CM

## 2019-04-08 DIAGNOSIS — K58.2 IRRITABLE BOWEL SYNDROME WITH BOTH CONSTIPATION AND DIARRHEA: ICD-10-CM

## 2019-04-08 DIAGNOSIS — R10.9 ABDOMINAL CRAMPING: ICD-10-CM

## 2019-04-08 DIAGNOSIS — R11.0 NAUSEA: ICD-10-CM

## 2019-04-08 DIAGNOSIS — K59.04 CHRONIC IDIOPATHIC CONSTIPATION: ICD-10-CM

## 2019-04-08 DIAGNOSIS — M79.7 FIBROMYALGIA: ICD-10-CM

## 2019-04-08 DIAGNOSIS — N17.9 ACUTE KIDNEY INJURY (H): Primary | ICD-10-CM

## 2019-04-08 DIAGNOSIS — F41.1 GAD (GENERALIZED ANXIETY DISORDER): ICD-10-CM

## 2019-04-08 DIAGNOSIS — K59.00 CONSTIPATION, UNSPECIFIED CONSTIPATION TYPE: ICD-10-CM

## 2019-04-08 PROCEDURE — 99214 OFFICE O/P EST MOD 30 MIN: CPT | Performed by: NURSE PRACTITIONER

## 2019-04-08 RX ORDER — ONDANSETRON 8 MG/1
8 TABLET, ORALLY DISINTEGRATING ORAL EVERY 8 HOURS PRN
Qty: 90 TABLET | Refills: 1 | Status: SHIPPED | OUTPATIENT
Start: 2019-04-08 | End: 2019-06-11

## 2019-04-08 RX ORDER — LORAZEPAM 0.5 MG/1
0.5 TABLET ORAL EVERY 6 HOURS PRN
Qty: 10 TABLET | Refills: 0 | Status: SHIPPED | OUTPATIENT
Start: 2019-04-08 | End: 2019-07-25

## 2019-04-08 RX ORDER — DICYCLOMINE HCL 20 MG
20 TABLET ORAL 4 TIMES DAILY PRN
Qty: 120 TABLET | Refills: 3 | Status: ON HOLD | OUTPATIENT
Start: 2019-04-08 | End: 2023-02-12

## 2019-04-08 RX ORDER — CITALOPRAM HYDROBROMIDE 10 MG/1
10 TABLET ORAL DAILY
Qty: 30 TABLET | Refills: 1 | Status: SHIPPED | OUTPATIENT
Start: 2019-04-08 | End: 2019-09-17

## 2019-04-08 RX ORDER — NORGESTIMATE AND ETHINYL ESTRADIOL 0.25-0.035
1 KIT ORAL DAILY
Qty: 84 TABLET | Refills: 4 | Status: ON HOLD | OUTPATIENT
Start: 2019-04-08 | End: 2019-05-07

## 2019-04-08 RX ORDER — LACTULOSE 20 G/30ML
30 SOLUTION ORAL 3 TIMES DAILY PRN
Qty: 300 ML | Refills: 3 | Status: SHIPPED | OUTPATIENT
Start: 2019-04-08

## 2019-04-08 RX ORDER — HYOSCYAMINE SULFATE 0.125 MG
0.125-0.25 TABLET ORAL EVERY 4 HOURS PRN
Qty: 40 TABLET | Refills: 1 | Status: SHIPPED | OUTPATIENT
Start: 2019-04-08 | End: 2019-06-11

## 2019-04-08 ASSESSMENT — PAIN SCALES - GENERAL: PAINLEVEL: MODERATE PAIN (4)

## 2019-04-08 NOTE — PROGRESS NOTES
This is a recent snapshot of the patient's [facility] medical record.  For current drug dose and complete information and questions, call [phone] or In Basket pool, fv home infusion ([id number])  CSN Number:  [id number]

## 2019-04-08 NOTE — PROGRESS NOTES
This is a recent snapshot of the patient's [facility] medical record.  For current drug dose and complete information and questions, call [phone] or In Basket pool, fv home infusion ([id number])  CSN Number:  [id number]

## 2019-04-09 ENCOUNTER — OFFICE VISIT (OUTPATIENT)
Dept: PODIATRY | Facility: CLINIC | Age: 24
End: 2019-04-09
Payer: COMMERCIAL

## 2019-04-09 VITALS
BODY MASS INDEX: 27.66 KG/M2 | HEIGHT: 63 IN | DIASTOLIC BLOOD PRESSURE: 88 MMHG | SYSTOLIC BLOOD PRESSURE: 130 MMHG | WEIGHT: 156.1 LBS

## 2019-04-09 DIAGNOSIS — Z98.890 S/P FOOT SURGERY, LEFT: Primary | ICD-10-CM

## 2019-04-09 LAB
ALBUMIN SERPL-MCNC: 3 G/DL (ref 3.4–5)
ALP SERPL-CCNC: 101 U/L (ref 40–150)
ALT SERPL W P-5'-P-CCNC: 24 U/L (ref 0–50)
ANION GAP SERPL CALCULATED.3IONS-SCNC: 6 MMOL/L (ref 3–14)
AST SERPL W P-5'-P-CCNC: 25 U/L (ref 0–45)
BASOPHILS # BLD AUTO: 0 10E9/L (ref 0–0.2)
BASOPHILS NFR BLD AUTO: 0.7 %
BILIRUB SERPL-MCNC: 0.4 MG/DL (ref 0.2–1.3)
BUN SERPL-MCNC: 10 MG/DL (ref 7–30)
CALCIUM SERPL-MCNC: 8.2 MG/DL (ref 8.5–10.1)
CHLORIDE SERPL-SCNC: 108 MMOL/L (ref 94–109)
CO2 SERPL-SCNC: 26 MMOL/L (ref 20–32)
CREAT SERPL-MCNC: 1.03 MG/DL (ref 0.52–1.04)
DIFFERENTIAL METHOD BLD: ABNORMAL
EOSINOPHIL # BLD AUTO: 0.2 10E9/L (ref 0–0.7)
EOSINOPHIL NFR BLD AUTO: 2.6 %
ERYTHROCYTE [DISTWIDTH] IN BLOOD BY AUTOMATED COUNT: 12.1 % (ref 10–15)
GFR SERPL CREATININE-BSD FRML MDRD: 76 ML/MIN/{1.73_M2}
GLUCOSE SERPL-MCNC: 166 MG/DL (ref 70–99)
HCT VFR BLD AUTO: 28.2 % (ref 35–47)
HGB BLD-MCNC: 9.5 G/DL (ref 11.7–15.7)
IMM GRANULOCYTES # BLD: 0 10E9/L (ref 0–0.4)
IMM GRANULOCYTES NFR BLD: 0.2 %
LYMPHOCYTES # BLD AUTO: 1.7 10E9/L (ref 0.8–5.3)
LYMPHOCYTES NFR BLD AUTO: 26.9 %
MCH RBC QN AUTO: 28.8 PG (ref 26.5–33)
MCHC RBC AUTO-ENTMCNC: 33.7 G/DL (ref 31.5–36.5)
MCV RBC AUTO: 86 FL (ref 78–100)
MONOCYTES # BLD AUTO: 0.3 10E9/L (ref 0–1.3)
MONOCYTES NFR BLD AUTO: 4.4 %
NEUTROPHILS # BLD AUTO: 4 10E9/L (ref 1.6–8.3)
NEUTROPHILS NFR BLD AUTO: 65.2 %
NRBC # BLD AUTO: 0 10*3/UL
NRBC BLD AUTO-RTO: 0 /100
PLATELET # BLD AUTO: 225 10E9/L (ref 150–450)
POTASSIUM SERPL-SCNC: 4 MMOL/L (ref 3.4–5.3)
PROT SERPL-MCNC: 6.2 G/DL (ref 6.8–8.8)
RBC # BLD AUTO: 3.3 10E12/L (ref 3.8–5.2)
SODIUM SERPL-SCNC: 140 MMOL/L (ref 133–144)
WBC # BLD AUTO: 6.1 10E9/L (ref 4–11)

## 2019-04-09 PROCEDURE — 99213 OFFICE O/P EST LOW 20 MIN: CPT | Performed by: PODIATRIST

## 2019-04-09 PROCEDURE — 80053 COMPREHEN METABOLIC PANEL: CPT | Performed by: FAMILY MEDICINE

## 2019-04-09 PROCEDURE — 85025 COMPLETE CBC W/AUTO DIFF WBC: CPT | Performed by: FAMILY MEDICINE

## 2019-04-09 ASSESSMENT — MIFFLIN-ST. JEOR: SCORE: 1427.19

## 2019-04-09 NOTE — PROGRESS NOTES
"Foot & Ankle Surgery   April 9, 2019    S:  Pt is seen today for evaluation of lateral L ankle.  Doing well, she has some discomfort, states things don't feel like they're in position.  Getting daptomycin IV through PICC line.    Vitals:    04/09/19 0749   BP: 130/88   Weight: 70.8 kg (156 lb 1.6 oz)   Height: 1.6 m (5' 3\")   '      ROS - Pos for CC.  Patient denies current nausea, vomiting, chills, fevers, belly pain, calf pain, chest pain or SOB.  Complete remainder of ROS it otherwise neg.      PE:  Gen:   No apparent distress  Eye:    Visual scanning without deficit  Ear:    Response to auditory stimuli wnl  Lung:    Non-labored breathing on RA noted  Abd:    NTND per patient report  Lymph:    Neg for pitting/non-pitting edema BLE  Vasc:    Pulses palpable, CFT minimally delayed  Neuro:    Light touch sensation intact to all sensory nerve distributions without paresthesias  Derm:    Most of incision is healed, but inferior-most portion is slow to heal, although no SOI noted  MSK:    L ankle not stressed  Calf:    Neg for redness, swelling or tenderness    Assessment:  23 year old female with healing lateral L ankle incision after I&D      Plan:  Discussed etiologies, anatomy and options  1.  Healing lateral L ankle incision sp previous I&D  -removed all but 1 suture, distal suture left intact  -wound care - wash/dry(no soaking/submerging), abx ointment/island bandaid  -continue compression  -WBAT in boot  -follow up 1 week for suture removal  -after that, follow up after IV abx for repeat MRI, and discussion about revision ankle stabilization    Follow up:  1 week or sooner with acute issues      Body mass index is 27.65 kg/m .  Weight management plan: Patient was referred to their PCP to discuss a diet and exercise plan.         Andres Johnson DPM FACFAS FACFAOM  Podiatric Foot & Ankle Surgeon  [facility]  [phone]    "

## 2019-04-10 ENCOUNTER — HOME INFUSION (PRE-WILLOW HOME INFUSION) (OUTPATIENT)
Dept: PHARMACY | Facility: CLINIC | Age: 24
End: 2019-04-10

## 2019-04-10 ENCOUNTER — OFFICE VISIT (OUTPATIENT)
Dept: CT IMAGING | Facility: CLINIC | Age: 24
End: 2019-04-10
Attending: INTERNAL MEDICINE
Payer: COMMERCIAL

## 2019-04-10 VITALS
HEART RATE: 101 BPM | SYSTOLIC BLOOD PRESSURE: 132 MMHG | TEMPERATURE: 97.8 F | DIASTOLIC BLOOD PRESSURE: 98 MMHG | OXYGEN SATURATION: 96 %

## 2019-04-10 DIAGNOSIS — T81.49XA SURGICAL SITE INFECTION: Primary | ICD-10-CM

## 2019-04-10 PROCEDURE — G0463 HOSPITAL OUTPT CLINIC VISIT: HCPCS | Mod: ZF

## 2019-04-10 RX ORDER — LIDOCAINE 40 MG/G
1 CREAM TOPICAL
COMMUNITY
End: 2022-10-28

## 2019-04-10 ASSESSMENT — PAIN SCALES - GENERAL: PAINLEVEL: MODERATE PAIN (5)

## 2019-04-10 NOTE — PROGRESS NOTES
ID clinic follow-up visit note:    Assessment:  1. L ankle synovectomy on 1/2 complicated by infection due to MSSA (and CONS, which is potentially contaminating, and did not undergo sensitivity testing) requiring requiring I/D on 3/12. She is beta-lactam allergic and vanco therapy was complicated by JOSE JUAN prompting hospitalization for Cr peak of 2, now on dapto with Cr normalized to 1.   She has been having difficulty with infusions. I am concerned that she is confusing her IVF with her Abx doses.   2. 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 IBS   4. GERD    Plan:  - continue daptomycin. Her abx course has been somewhat inconsistent with fluctuating vanco levels and ultimately transition to dapto, which was further complicated by possible missed doses. I would like to continue her course until at least 5/1  - it is notable that dapto is not first-line for MSSA but is being used due to her allergy profile. If there is persistence of infection, manifest as drainage, erythema, and difficulty with healing, would consider repeat debridement and de-sensitization to beta-lactam therapy  - I have discussed with [facility] to try to ensure she has clear instructions for infusion  - f/u with me on 4/17    Visit length 45 min, >50% clinical counseling/care coordination.    Gerda Mehta MD   of Medicine, Division of Infectious Diseases  [facility] [phone]      HPI:  This is a 22 y/o woman with PMh Bechet's, fibromylagia, GERD and IBS. She underwent L ankle arthroscopy with synovectomy on 1/2/19. This was complicated by wound drainage that initially improved with clindamycin but ultimately recurred prompting reinitiation of clinda. Despite restarting clinda the drainage persisted and was accompanied by erythema prompting eventual I/D on 3/12. Cultures from the procedure grew MSSA and CONS. She was placed on vanco due to her report of ?rash (reported by her father) and hives with penicillins and cephalsoporins,  respectively.    HEr course was complicated by JOSE JUAN with supratherapeutic vanco levels prompting change from vanco to dapto on 4/1. She reports she's had difficulty with her infusions due to not receiving timely shipments of her medications. She states that deliveries have not been on time, that she is depleting supply prior to her next shipment, and that on one occasion supplies arrived damaged. I spoke with [facility], and they report that small, frequent quantities have been shipped to [facility], and that [facility] on one occasion was incorrect about the quantity of drug she had remaining. [facility] speculated that Samara is having difficulty due to the fact that she is receiving IVF for hydration along with IV abx.    She continues to have some discomfort around her L lower leg. No other complaints.     Past Medical History:   Diagnosis Date     Anemia      Anxiety      Arthritis      Behcet's disease (H)      Cervical adenitis May 2010     Chronic abdominal pain      Constipation, chronic 1994     Fibromyalgia      Gastro-oesophageal reflux disease      Gastroparesis      Irregular heart beat     tachycardia, has had workup     Migraines      Neuromuscular disorder (H)     fibramyalgia     Palpitations      Seizure (H)      Seizures (H)     unknown etiology     Syncope      Tourette's      Current Outpatient Medications   Medication     lidocaine (LMX4) 4 % external cream     albuterol (PROAIR HFA/PROVENTIL HFA/VENTOLIN HFA) 108 (90 BASE) MCG/ACT Inhaler     amitriptyline (ELAVIL) 25 MG tablet     artificial tears OINT ophthalmic ointment     aspirin (ASPIRIN CHILDRENS) 81 MG chewable tablet     benzocaine (TOPICALE XTRA) 20 % GEL     betamethasone valerate (VALISONE) 0.1 % cream     bisacodyl (DULCOLAX) 5 MG EC tablet     citalopram (CELEXA) 10 MG tablet     clobetasol (TEMOVATE) 0.05 % cream     colchicine (COLCYRS) 0.6 MG tablet     cyproheptadine (PERIACTIN) 4 MG tablet     dicyclomine (BENTYL) 20 MG tablet      diphenhydrAMINE (BENADRYL) 25 MG tablet     EPINEPHrine (EPIPEN 2-EAMON) 0.3 MG/0.3ML injection     gabapentin (NEURONTIN) 100 MG capsule     guanFACINE (TENEX) 1 MG tablet     hydrocortisone 1 % CREA cream     hyoscyamine (ANASPAZ/LEVSIN) 0.125 MG tablet     hypromellose (GENTEAL) 0.3 % SOLN     lactulose 20 GM/30ML SOLN     linaclotide (LINZESS) 145 MCG capsule     LORazepam (ATIVAN) 0.5 MG tablet     Magic Mouthwash (FV std formula) lidocaine visc 2% 2.5mL/5mL & maalox/mylanta w/ simeth 2.5mL/5mL & diphenhydrAMINE 5mg/5mL     nitroFURantoin macrocrystal-monohydrate (MACROBID) 100 MG capsule     norgestimate-ethinyl estradiol (ORTHO-CYCLEN/SPRINTEC) 0.25-35 MG-MCG tablet     omeprazole (PRILOSEC) 40 MG capsule     OnabotulinumtoxinA (BOTOX IJ)     ondansetron (ZOFRAN-ODT) 8 MG ODT tab     order for DME     order for DME     order for DME     order for DME     polyethylene glycol (MIRALAX/GLYCOLAX) powder     sennosides (SENOKOT) 8.6 MG tablet     sodium chloride 0.9% SOLN BOLUS     sucralfate (CARAFATE) 1 GM/10ML suspension     Current Facility-Administered Medications   Medication     heparin lock flush 10 UNIT/ML injection 5 mL     Exam:  BP (!) 132/98   Pulse 101   Temp 97.8  F (36.6  C) (Oral)   SpO2 96%   Awake, alert.  L ankle inspected. There is a curvilinear incision at the L later ankle. There is a small persistent area of opening at the lateral aspect. Little to no drainage, no erythema or induration.   Wears a boot, uses a self-propelled wheeled scooter for mobility

## 2019-04-10 NOTE — NURSING NOTE
Visit Reason: Post hospital    Evaluation / Assessment: Patient states pain level 5/10 in left foot. Vitals taken, allergies and medications reviewed.    Labs: NA    Procedure ordered? NA    Dressing Change: NA    Vaccine ordered / administered: NA    Other: NA

## 2019-04-11 LAB
ALT SERPL W P-5'-P-CCNC: 20 U/L (ref 0–50)
AST SERPL W P-5'-P-CCNC: 16 U/L (ref 0–45)
BASOPHILS # BLD AUTO: 0 10E9/L (ref 0–0.2)
BASOPHILS NFR BLD AUTO: 0.5 %
BUN SERPL-MCNC: 11 MG/DL (ref 7–30)
CREAT SERPL-MCNC: 1.05 MG/DL (ref 0.52–1.04)
CRP SERPL-MCNC: 3.4 MG/L (ref 0–8)
DIFFERENTIAL METHOD BLD: ABNORMAL
EOSINOPHIL # BLD AUTO: 0.2 10E9/L (ref 0–0.7)
EOSINOPHIL NFR BLD AUTO: 3.1 %
ERYTHROCYTE [DISTWIDTH] IN BLOOD BY AUTOMATED COUNT: 12.2 % (ref 10–15)
ERYTHROCYTE [SEDIMENTATION RATE] IN BLOOD BY WESTERGREN METHOD: 16 MM/H (ref 0–20)
GFR SERPL CREATININE-BSD FRML MDRD: 74 ML/MIN/{1.73_M2}
HCT VFR BLD AUTO: 29.3 % (ref 35–47)
HGB BLD-MCNC: 9.8 G/DL (ref 11.7–15.7)
IMM GRANULOCYTES # BLD: 0 10E9/L (ref 0–0.4)
IMM GRANULOCYTES NFR BLD: 0.2 %
LYMPHOCYTES # BLD AUTO: 1.7 10E9/L (ref 0.8–5.3)
LYMPHOCYTES NFR BLD AUTO: 27 %
MCH RBC QN AUTO: 28.6 PG (ref 26.5–33)
MCHC RBC AUTO-ENTMCNC: 33.4 G/DL (ref 31.5–36.5)
MCV RBC AUTO: 85 FL (ref 78–100)
MONOCYTES # BLD AUTO: 0.2 10E9/L (ref 0–1.3)
MONOCYTES NFR BLD AUTO: 3.9 %
NEUTROPHILS # BLD AUTO: 4 10E9/L (ref 1.6–8.3)
NEUTROPHILS NFR BLD AUTO: 65.3 %
NRBC # BLD AUTO: 0 10*3/UL
NRBC BLD AUTO-RTO: 0 /100
PLATELET # BLD AUTO: 218 10E9/L (ref 150–450)
RBC # BLD AUTO: 3.43 10E12/L (ref 3.8–5.2)
WBC # BLD AUTO: 6.2 10E9/L (ref 4–11)

## 2019-04-11 PROCEDURE — 85652 RBC SED RATE AUTOMATED: CPT | Performed by: INTERNAL MEDICINE

## 2019-04-11 PROCEDURE — 82565 ASSAY OF CREATININE: CPT | Performed by: INTERNAL MEDICINE

## 2019-04-11 PROCEDURE — 85025 COMPLETE CBC W/AUTO DIFF WBC: CPT | Performed by: INTERNAL MEDICINE

## 2019-04-11 PROCEDURE — 84520 ASSAY OF UREA NITROGEN: CPT | Performed by: INTERNAL MEDICINE

## 2019-04-11 PROCEDURE — 84460 ALANINE AMINO (ALT) (SGPT): CPT | Performed by: INTERNAL MEDICINE

## 2019-04-11 PROCEDURE — 86140 C-REACTIVE PROTEIN: CPT | Performed by: INTERNAL MEDICINE

## 2019-04-11 PROCEDURE — 82550 ASSAY OF CK (CPK): CPT | Performed by: INTERNAL MEDICINE

## 2019-04-11 PROCEDURE — 84450 TRANSFERASE (AST) (SGOT): CPT | Performed by: INTERNAL MEDICINE

## 2019-04-11 NOTE — PROGRESS NOTES
This is a recent snapshot of the patient's [facility] medical record.  For current drug dose and complete information and questions, call [phone] or In Basket pool, fv home infusion ([id number])  CSN Number:  [id number]

## 2019-04-11 NOTE — TELEPHONE ENCOUNTER
Writer was able to schedule pt for 5/9/19 @ 1:30 for RANDELL Adames visit followed by 2:00 w/ Dr. Garcia & Emmanuel. Address confirmed for new pt letter, one will be mailed to the pt.    Shara Webb  [facility]

## 2019-04-12 ENCOUNTER — HOME INFUSION (PRE-WILLOW HOME INFUSION) (OUTPATIENT)
Dept: PHARMACY | Facility: CLINIC | Age: 24
End: 2019-04-12

## 2019-04-12 ENCOUNTER — TELEPHONE (OUTPATIENT)
Dept: FAMILY MEDICINE | Facility: CLINIC | Age: 24
End: 2019-04-12

## 2019-04-12 LAB — CK SERPL-CCNC: 57 U/L (ref 30–225)

## 2019-04-12 NOTE — LETTER
April 12, 2019        Samara Oropeza  [address]   [place] [address]    Dear Samara,    Thank you for choosing [facility].   We are so happy that you decided to entrust your care to us.  Your appointment is scheduled on Thursday 5/9/19 at 1:30 pm with Dr. Mary Kay Garcia  and Po Chinchilla [facility].  We are dedicated to providing you with great care and results.    We believe in treating each patient as a whole person, because health issues can increase stress and stress can affect health issues.  At our clinic you will have the opportunity to meet with members of our care team, which includes a pharmacist, behavioral health clinicians and the patient navigator.  During your first visit you will meet with both a medical provider and a behavioral health clinician.  You may incur a co-pay or bill for the visit with the behavioral health clinician. The behavioral health clinician works with the primary care provider to help patients maintain and develop healthy behaviors, while addressing any challenges that might get in the way of staying as healthy as possible.  Our pharmacist, Rosangela, provides consultation to the medical team and works with patients in managing medications.  Our patient navigator, Emily, works with patients to help arrange specialty appointments or transportation and ensures that the patient is getting to his/her appointments.  If you are have any questions about your appointment, please contact Emily at [phone].    We ve put together some ideas to help you best utilize and access your primary care clinic.  If you have any questions please feel free to ask any of our staff.    Please call ([phone]) if you have an urgent need    Please call us to discuss what is happening; we may be able to help you avoid having to go to the emergency room. We will do our best to see you the same day to help provide the best care. If you do feel that  you need to walk in to the clinic please be aware that we will do our best to accommodate you but it may not always be possible.    If you do require immediate care because of a life threatening condition, please go to the emergency room or call 911.       Bring your medication or empty medication bottles and name and number for your pharmacy    It is important for us to know exactly what is prescribed, when it was prescribed and how you are to take it. This will assist us in understanding any medication issues that may be occurring. Also, by knowing how to contact your pharmacy we can more easily refill your prescriptions.  We request a 72 hour notice for refills.      Bring the name and number or address of your last provider and any current providers     Be prepared to complete a Release of Information for previous medical care.  This allows us to obtain a release of information to contact them for your medical issues to provide the best possible continuity of care. Your past medical care records help us get to know you.       Other  Please call us if you cannot make the appointment or need to reschedule.  Also, please let us know if there is any change in your address or phone number as we want to stay updated on how to best contact you.    We are so excited to meet you and look forward to seeing you at your appointment.    Sincerely,        Your Care Team at [facility]

## 2019-04-15 ENCOUNTER — TELEPHONE (OUTPATIENT)
Dept: FAMILY MEDICINE | Facility: CLINIC | Age: 24
End: 2019-04-15

## 2019-04-15 DIAGNOSIS — Z30.09 GENERAL COUNSELING FOR PRESCRIPTION OF ORAL CONTRACEPTIVES: ICD-10-CM

## 2019-04-15 RX ORDER — NORGESTIMATE AND ETHINYL ESTRADIOL 0.25-0.035
KIT ORAL
Qty: 84 TABLET | Refills: 1 | Status: SHIPPED | OUTPATIENT
Start: 2019-04-15 | End: 2019-06-11

## 2019-04-15 NOTE — TELEPHONE ENCOUNTER
Sonja,  Please see phone message below.     Can triage call pharmacy and give verbal order for continuous use (skipping placebo pills)?    Please advise    Thank You!  Ksenia Medina, JESSY  Triage Nurse

## 2019-04-15 NOTE — TELEPHONE ENCOUNTER
Pharmacy requesting new Rx with more clear instructions. See 04/15/2019 TE    Prescription approved per [facility].    Ksenia Medina, JESSY  Triage Nurse

## 2019-04-15 NOTE — TELEPHONE ENCOUNTER
Reason for call:  Other   Patient called regarding (reason for call): call back  Additional comments: Esther a pharmacist at [facility] called with questions regarding the norgestimate-ethinyl estradiol (ORTHO-CYCLEN/SPRINTEC) 0.25-35 MG-MCG tablet that Sonja Abreu sent in for the patient. She stated that the patient said that she skips the placebo pills and takes the pills continuously and she just wanted to clarify that with Sonja. She would like a call back to discuss this.     Phone number to reach patient:  Other phone number: [phone]    Best Time: Any    Can we leave a detailed message on this number?  YES

## 2019-04-15 NOTE — TELEPHONE ENCOUNTER
Spoke with pharmacy, continuously take med, skip placebo pills    Britni Matamoros RN   [facility]

## 2019-04-16 ENCOUNTER — OFFICE VISIT (OUTPATIENT)
Dept: PODIATRY | Facility: CLINIC | Age: 24
End: 2019-04-16
Payer: COMMERCIAL

## 2019-04-16 VITALS — RESPIRATION RATE: 16 BRPM | HEIGHT: 63 IN | BODY MASS INDEX: 27.66 KG/M2 | WEIGHT: 156.1 LBS

## 2019-04-16 DIAGNOSIS — Z98.890 S/P FOOT SURGERY, LEFT: Primary | ICD-10-CM

## 2019-04-16 PROCEDURE — 99024 POSTOP FOLLOW-UP VISIT: CPT | Performed by: PODIATRIST

## 2019-04-16 ASSESSMENT — MIFFLIN-ST. JEOR: SCORE: 1427.19

## 2019-04-16 NOTE — PROGRESS NOTES
"Foot & Ankle Surgery   April 16, 2019    S:  Pt is seen today for evaluation of lateral L ankle, follow up for removal of remaining suture.  She states she met with someone last week and was told the IV abx/PICC will be extended for 4 further weeks.  She was also told she may need another washout of her ankle, and that the bone infection can return.  She hasn't seen much drainage from the area any longer    Vitals:    04/16/19 1041   Resp: 16   Weight: 70.8 kg (156 lb 1.6 oz)   Height: 1.6 m (5' 3\")   '      ROS - Pos for CC.  Patient denies current nausea, vomiting, chills, fevers, belly pain, calf pain, chest pain or SOB.  Complete remainder of ROS it otherwise neg.      PE:  Gen:   No apparent distress  Eye:    Visual scanning without deficit  Ear:    Response to auditory stimuli wnl  Lung:    Non-labored breathing on RA noted  Abd:    NTND per patient report  Lymph:    Neg for pitting/non-pitting edema BLE  Vasc:    Pulses palpable, CFT minimally delayed  Neuro:    Light touch sensation intact to all sensory nerve distributions without paresthesias  Derm:    Single suture remains.  The skin is invaginated here but appears to be epithelialized.  No SOI.    MSK:    Ankle not stressed today  Calf:    Neg for redness, swelling or tenderness      Assessment:  23 year old female with healing lateral L ankle incision after I&D        Plan:  Discussed etiologies, anatomy and options  1.  Healing lateral L ankle incision sp previous I&D  -remaining suture removed  -wound care - wash/dry(no soaking/submerging), iodosorb/island bandaid until fully healed, but appears to be nearly fully epithelialized  -continue compression  -WBAT in boot; ok to use comfortable shoe with/without brace based on pain/stability  -follow up after IV abx for repeat MRI, and discussion about revision ankle stabilization.    -we discussed that a bone biopsy as a separate procedure likely necessary, leslie if MRI findings are ambiguous, to rule out " osteomyelitis prior to revision lateral stabilization.    -discussed that bone infection can return, even with prolonged IV abx course, as this done not fully eradicate the infection        Follow up:  After IV abx course has finished or sooner with acute issues      Body mass index is 27.65 kg/m .  Weight management plan: Patient was referred to their PCP to discuss a diet and exercise plan.         Andres Johnson DPM FACFAS FACFAOM  Podiatric Foot & Ankle Surgeon  [facility]  [phone]

## 2019-04-18 LAB
AST SERPL W P-5'-P-CCNC: 28 U/L (ref 0–45)
BASOPHILS # BLD AUTO: 0 10E9/L (ref 0–0.2)
BASOPHILS NFR BLD AUTO: 0.2 %
BUN SERPL-MCNC: 15 MG/DL (ref 7–30)
CREAT SERPL-MCNC: 0.98 MG/DL (ref 0.52–1.04)
CRP SERPL-MCNC: 3.5 MG/L (ref 0–8)
DIFFERENTIAL METHOD BLD: ABNORMAL
EOSINOPHIL # BLD AUTO: 0.2 10E9/L (ref 0–0.7)
EOSINOPHIL NFR BLD AUTO: 2.5 %
ERYTHROCYTE [DISTWIDTH] IN BLOOD BY AUTOMATED COUNT: 12.6 % (ref 10–15)
ERYTHROCYTE [SEDIMENTATION RATE] IN BLOOD BY WESTERGREN METHOD: 11 MM/H (ref 0–20)
GFR SERPL CREATININE-BSD FRML MDRD: 81 ML/MIN/{1.73_M2}
HCT VFR BLD AUTO: 31.6 % (ref 35–47)
HGB BLD-MCNC: 10.4 G/DL (ref 11.7–15.7)
IMM GRANULOCYTES # BLD: 0 10E9/L (ref 0–0.4)
IMM GRANULOCYTES NFR BLD: 0.2 %
LYMPHOCYTES # BLD AUTO: 1.9 10E9/L (ref 0.8–5.3)
LYMPHOCYTES NFR BLD AUTO: 32.1 %
MCH RBC QN AUTO: 28.5 PG (ref 26.5–33)
MCHC RBC AUTO-ENTMCNC: 32.9 G/DL (ref 31.5–36.5)
MCV RBC AUTO: 87 FL (ref 78–100)
MONOCYTES # BLD AUTO: 0.5 10E9/L (ref 0–1.3)
MONOCYTES NFR BLD AUTO: 7.6 %
NEUTROPHILS # BLD AUTO: 3.4 10E9/L (ref 1.6–8.3)
NEUTROPHILS NFR BLD AUTO: 57.4 %
NRBC # BLD AUTO: 0 10*3/UL
NRBC BLD AUTO-RTO: 0 /100
PLATELET # BLD AUTO: 211 10E9/L (ref 150–450)
RBC # BLD AUTO: 3.65 10E12/L (ref 3.8–5.2)
WBC # BLD AUTO: 6 10E9/L (ref 4–11)

## 2019-04-18 PROCEDURE — 86140 C-REACTIVE PROTEIN: CPT | Performed by: INTERNAL MEDICINE

## 2019-04-18 PROCEDURE — 85025 COMPLETE CBC W/AUTO DIFF WBC: CPT | Performed by: INTERNAL MEDICINE

## 2019-04-18 PROCEDURE — 85652 RBC SED RATE AUTOMATED: CPT | Performed by: INTERNAL MEDICINE

## 2019-04-18 PROCEDURE — 82550 ASSAY OF CK (CPK): CPT | Performed by: INTERNAL MEDICINE

## 2019-04-18 PROCEDURE — 82565 ASSAY OF CREATININE: CPT | Performed by: INTERNAL MEDICINE

## 2019-04-18 PROCEDURE — 84520 ASSAY OF UREA NITROGEN: CPT | Performed by: INTERNAL MEDICINE

## 2019-04-18 PROCEDURE — 84450 TRANSFERASE (AST) (SGOT): CPT | Performed by: INTERNAL MEDICINE

## 2019-04-19 ENCOUNTER — HOME INFUSION (PRE-WILLOW HOME INFUSION) (OUTPATIENT)
Dept: PHARMACY | Facility: CLINIC | Age: 24
End: 2019-04-19

## 2019-04-19 ENCOUNTER — TELEPHONE (OUTPATIENT)
Dept: INFECTIOUS DISEASES | Facility: CLINIC | Age: 24
End: 2019-04-19

## 2019-04-19 LAB — CK SERPL-CCNC: NORMAL U/L (ref 30–225)

## 2019-04-19 NOTE — TELEPHONE ENCOUNTER
Samara is followed for ankle arthrodesis c/b infection due to MSSA, on vanco->dapto due to JOSE JUAN with vanco.     Having some tingling in her hands - this was reported to me by phone form [facility] infusion pharmacist. Etiology unclear, and not spreading beyond hands. ? Minor VO. Dapto is not commonly associated with neuropathy.     Prefer to continue dapto until she is seen by me on 4/23. Fortunately her incision was well-appearing during 4/16 visit. Thus, if tingling spreads to involve additional body areas and/or she refuses further doses, I would be okay with placing dapto on hold until she is seen by me next week.    Gerda Mehta MD   of Medicine, Division of Infectious Diseases  [facility] [phone]

## 2019-04-22 NOTE — PROGRESS NOTES
This is a recent snapshot of the patient's [facility] medical record.  For current drug dose and complete information and questions, call [phone] or In Basket pool, fv home infusion ([id number])  CSN Number:  [id number]

## 2019-04-23 ENCOUNTER — HOME INFUSION (PRE-WILLOW HOME INFUSION) (OUTPATIENT)
Dept: PHARMACY | Facility: CLINIC | Age: 24
End: 2019-04-23

## 2019-04-23 ENCOUNTER — OFFICE VISIT (OUTPATIENT)
Dept: PSYCHOLOGY | Facility: CLINIC | Age: 24
End: 2019-04-23
Payer: COMMERCIAL

## 2019-04-23 ENCOUNTER — OFFICE VISIT (OUTPATIENT)
Dept: CT IMAGING | Facility: CLINIC | Age: 24
End: 2019-04-23
Attending: INTERNAL MEDICINE
Payer: COMMERCIAL

## 2019-04-23 VITALS
OXYGEN SATURATION: 97 % | DIASTOLIC BLOOD PRESSURE: 88 MMHG | TEMPERATURE: 97.3 F | HEART RATE: 112 BPM | SYSTOLIC BLOOD PRESSURE: 135 MMHG

## 2019-04-23 DIAGNOSIS — F41.1 GAD (GENERALIZED ANXIETY DISORDER): ICD-10-CM

## 2019-04-23 DIAGNOSIS — T81.49XA SURGICAL SITE INFECTION: Primary | ICD-10-CM

## 2019-04-23 DIAGNOSIS — F33.1 MAJOR DEPRESSIVE DISORDER, RECURRENT EPISODE, MODERATE (H): Primary | ICD-10-CM

## 2019-04-23 LAB
AST SERPL W P-5'-P-CCNC: 25 U/L (ref 0–45)
BASOPHILS # BLD AUTO: 0 10E9/L (ref 0–0.2)
BASOPHILS NFR BLD AUTO: 0.3 %
BUN SERPL-MCNC: 10 MG/DL (ref 7–30)
CK SERPL-CCNC: 87 U/L (ref 30–225)
CREAT SERPL-MCNC: 0.86 MG/DL (ref 0.52–1.04)
CRP SERPL-MCNC: 8.9 MG/L (ref 0–8)
DIFFERENTIAL METHOD BLD: ABNORMAL
EOSINOPHIL # BLD AUTO: 0.1 10E9/L (ref 0–0.7)
EOSINOPHIL NFR BLD AUTO: 2.9 %
ERYTHROCYTE [DISTWIDTH] IN BLOOD BY AUTOMATED COUNT: 12.8 % (ref 10–15)
ERYTHROCYTE [SEDIMENTATION RATE] IN BLOOD BY WESTERGREN METHOD: 13 MM/H (ref 0–20)
GFR SERPL CREATININE-BSD FRML MDRD: >90 ML/MIN/{1.73_M2}
HCT VFR BLD AUTO: 28.6 % (ref 35–47)
HGB BLD-MCNC: 9.6 G/DL (ref 11.7–15.7)
IMM GRANULOCYTES # BLD: 0 10E9/L (ref 0–0.4)
IMM GRANULOCYTES NFR BLD: 0 %
LYMPHOCYTES # BLD AUTO: 1.1 10E9/L (ref 0.8–5.3)
LYMPHOCYTES NFR BLD AUTO: 29.2 %
MCH RBC QN AUTO: 29 PG (ref 26.5–33)
MCHC RBC AUTO-ENTMCNC: 33.6 G/DL (ref 31.5–36.5)
MCV RBC AUTO: 86 FL (ref 78–100)
MONOCYTES # BLD AUTO: 0.5 10E9/L (ref 0–1.3)
MONOCYTES NFR BLD AUTO: 11.7 %
NEUTROPHILS # BLD AUTO: 2.1 10E9/L (ref 1.6–8.3)
NEUTROPHILS NFR BLD AUTO: 55.9 %
NRBC # BLD AUTO: 0 10*3/UL
NRBC BLD AUTO-RTO: 0 /100
PLATELET # BLD AUTO: 179 10E9/L (ref 150–450)
RBC # BLD AUTO: 3.31 10E12/L (ref 3.8–5.2)
WBC # BLD AUTO: 3.8 10E9/L (ref 4–11)

## 2019-04-23 PROCEDURE — 90834 PSYTX W PT 45 MINUTES: CPT | Performed by: COUNSELOR

## 2019-04-23 PROCEDURE — 82565 ASSAY OF CREATININE: CPT | Performed by: INTERNAL MEDICINE

## 2019-04-23 PROCEDURE — 85652 RBC SED RATE AUTOMATED: CPT | Performed by: INTERNAL MEDICINE

## 2019-04-23 PROCEDURE — 84520 ASSAY OF UREA NITROGEN: CPT | Performed by: INTERNAL MEDICINE

## 2019-04-23 PROCEDURE — 84450 TRANSFERASE (AST) (SGOT): CPT | Performed by: INTERNAL MEDICINE

## 2019-04-23 PROCEDURE — 86140 C-REACTIVE PROTEIN: CPT | Performed by: INTERNAL MEDICINE

## 2019-04-23 PROCEDURE — G0463 HOSPITAL OUTPT CLINIC VISIT: HCPCS | Mod: ZF

## 2019-04-23 PROCEDURE — 85025 COMPLETE CBC W/AUTO DIFF WBC: CPT | Performed by: INTERNAL MEDICINE

## 2019-04-23 PROCEDURE — 82550 ASSAY OF CK (CPK): CPT | Performed by: INTERNAL MEDICINE

## 2019-04-23 ASSESSMENT — ANXIETY QUESTIONNAIRES
5. BEING SO RESTLESS THAT IT IS HARD TO SIT STILL: SEVERAL DAYS
7. FEELING AFRAID AS IF SOMETHING AWFUL MIGHT HAPPEN: SEVERAL DAYS
GAD7 TOTAL SCORE: 11
1. FEELING NERVOUS, ANXIOUS, OR ON EDGE: MORE THAN HALF THE DAYS
IF YOU CHECKED OFF ANY PROBLEMS ON THIS QUESTIONNAIRE, HOW DIFFICULT HAVE THESE PROBLEMS MADE IT FOR YOU TO DO YOUR WORK, TAKE CARE OF THINGS AT HOME, OR GET ALONG WITH OTHER PEOPLE: SOMEWHAT DIFFICULT
2. NOT BEING ABLE TO STOP OR CONTROL WORRYING: SEVERAL DAYS
3. WORRYING TOO MUCH ABOUT DIFFERENT THINGS: MORE THAN HALF THE DAYS
6. BECOMING EASILY ANNOYED OR IRRITABLE: SEVERAL DAYS

## 2019-04-23 ASSESSMENT — PAIN SCALES - GENERAL: PAINLEVEL: SEVERE PAIN (6)

## 2019-04-23 ASSESSMENT — PATIENT HEALTH QUESTIONNAIRE - PHQ9
SUM OF ALL RESPONSES TO PHQ QUESTIONS 1-9: 8
5. POOR APPETITE OR OVEREATING: NEARLY EVERY DAY

## 2019-04-23 NOTE — NURSING NOTE
Visit Reason: Follow Up      Evaluation / Assessment:   Patient states pain level of 6/10 in head and throat today. Allergies and medications reviewed. Vitals taken, allergies and medications reviewed.      Labs: NA      Procedure ordered? NA      Dressing Change: NA      Vaccine ordered / administered: NA      Other: NA

## 2019-04-23 NOTE — PROGRESS NOTES
ID clinic follow-up visit note:    Assessment:  1. L ankle synovectomy on 1/2/19 complicated by infection due to MSSA (and CONS, which is potentially contaminating, and did not undergo sensitivity testing) requiring requiring I/D on 3/12. She is beta-lactam allergic and vanco therapy was complicated by JOSE JUAN prompting hospitalization for Cr peak of 2, now on dapto with Cr normalized to 1. Fortunately she appears to be having a good response with her incision closed. She continues to have pain that is ~6/10.  Regarding the numbness in her hands, I have not seen this s/e with dapto. CK was unfortunately uninterpretable last week.   2. 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 IBS   4. GERD  5. Probable viral URI  6. Tachycardia, subacute. Today 112, 2 weeks ago 101bpm. Unclear etiology. Normotensive.    Plan:  - continue daptomycin. Her abx course has been somewhat inconsistent with fluctuating vanco levels and ultimately transition to dapto, which was further complicated by possible missed doses. I would like to continue her course until at least 5/1. Will request labs be done earlier than 4/25 to check on CK in light of her tingling.  - it is notable that dapto is not first-line for MSSA but is being used due to her allergy profile. If there is persistence of infection, manifest as drainage of any quantity, erythema, and difficulty with healing, would consider repeat debridement and de-sensitization to beta-lactam therapy  - given tachycardia, I referred to PCP for eval and declined refilling her b-agonist inhaler.  - f/u with me as needed    Visit length 25 min, >50% clinical counseling/care coordination.    Gerda Mehta MD   of Medicine, Division of Infectious Diseases  [facility] [phone]      HPI:  This is a 22 y/o woman with PMh Bechet's, fibromylagia, GERD and IBS. She underwent L ankle arthroscopy with synovectomy on 1/2/19. This was complicated by wound drainage that initially improved with  clindamycin but ultimately recurred prompting reinitiation of clinda. Despite restarting clinda the drainage persisted and was accompanied by erythema prompting eventual I/D on 3/12. Cultures from the procedure grew MSSA and CONS. She was placed on vanco due to her report of ?rash (reported by her father) and hives with penicillins and cephalsoporins, respectively.    Her course was complicated by JOSE JUAN with supratherapeutic vanco levels prompting change from vanco to dapto on 4/1. She previously reported (during my last visit with her on 4/10)  difficulty with her infusions due to not receiving timely shipments of her medications. She reports drug dosing has kyle on time, but she continues to report issues with receipt of heparin flushes, dressing supplies.    She continues to have some discomfort around her L lower leg. This is 6/10 at rest, increased with palpation around the lateral malleolus with radiation to the toes. Also pain with rotation of the ankle.     Over the last 2 days she reports some cough with wheezing. Her male SO has had similar symptoms. Today fortunately she seems to be doing better. She is prescribed an inhaler, but reports she has not been able to find it for several months.    Lastly, she reported some numbness and tingling in her hands last week. This seems to be getting better, but has incompletely resolved.    Past Medical History:   Diagnosis Date     Anemia      Anxiety      Arthritis      Behcet's disease (H)      Cervical adenitis May 2010     Chronic abdominal pain      Constipation, chronic 1994     Fibromyalgia      Gastro-oesophageal reflux disease      Gastroparesis      Irregular heart beat     tachycardia, has had workup     Migraines      Neuromuscular disorder (H)     fibramyalgia     Palpitations      Seizure (H)      Seizures (H)     unknown etiology     Syncope      Tourette's      Current Outpatient Medications   Medication     albuterol (PROAIR HFA/PROVENTIL HFA/VENTOLIN  HFA) 108 (90 BASE) MCG/ACT Inhaler     amitriptyline (ELAVIL) 25 MG tablet     artificial tears OINT ophthalmic ointment     aspirin (ASPIRIN CHILDRENS) 81 MG chewable tablet     benzocaine (TOPICALE XTRA) 20 % GEL     betamethasone valerate (VALISONE) 0.1 % cream     bisacodyl (DULCOLAX) 5 MG EC tablet     citalopram (CELEXA) 10 MG tablet     clobetasol (TEMOVATE) 0.05 % cream     colchicine (COLCYRS) 0.6 MG tablet     cyproheptadine (PERIACTIN) 4 MG tablet     dicyclomine (BENTYL) 20 MG tablet     diphenhydrAMINE (BENADRYL) 25 MG tablet     EPINEPHrine (EPIPEN 2-EAMON) 0.3 MG/0.3ML injection     gabapentin (NEURONTIN) 100 MG capsule     guanFACINE (TENEX) 1 MG tablet     hydrocortisone 1 % CREA cream     hyoscyamine (ANASPAZ/LEVSIN) 0.125 MG tablet     hypromellose (GENTEAL) 0.3 % SOLN     lactulose 20 GM/30ML SOLN     lidocaine (LMX4) 4 % external cream     linaclotide (LINZESS) 145 MCG capsule     LORazepam (ATIVAN) 0.5 MG tablet     Magic Mouthwash (FV std formula) lidocaine visc 2% 2.5mL/5mL & maalox/mylanta w/ simeth 2.5mL/5mL & diphenhydrAMINE 5mg/5mL     nitroFURantoin macrocrystal-monohydrate (MACROBID) 100 MG capsule     norgestimate-ethinyl estradiol (ORTHO-CYCLEN/SPRINTEC) 0.25-35 MG-MCG tablet     omeprazole (PRILOSEC) 40 MG capsule     OnabotulinumtoxinA (BOTOX IJ)     ondansetron (ZOFRAN-ODT) 8 MG ODT tab     order for DME     order for DME     order for DME     order for DME     polyethylene glycol (MIRALAX/GLYCOLAX) powder     sennosides (SENOKOT) 8.6 MG tablet     sodium chloride 0.9% SOLN BOLUS     SPRINTEC 28 0.25-35 MG-MCG tablet     sucralfate (CARAFATE) 1 GM/10ML suspension     Current Facility-Administered Medications   Medication     heparin lock flush 10 UNIT/ML injection 5 mL     Exam:  /88 (BP Location: Left arm, Patient Position: Sitting, Cuff Size: Adult Regular)   Pulse 112   Temp 97.3  F (36.3  C) (Oral)   SpO2 97%   Awake, alert.  Breathing normal, no audible wheeze  L ankle  inspected. There is a curvilinear incision at the L later ankle. There are 2 small scabbed-over areas. No erythema or significant induration. No drainage. I don't appreciate swelling. There is tenderness to palpation around the malleolus and also pain with rotation of the ankle  Wears a boot, uses a self-propelled wheeled scooter for mobility
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
## 2019-04-24 ASSESSMENT — ANXIETY QUESTIONNAIRES: GAD7 TOTAL SCORE: 11

## 2019-04-25 ENCOUNTER — PATIENT OUTREACH (OUTPATIENT)
Dept: CARE COORDINATION | Facility: CLINIC | Age: 24
End: 2019-04-25

## 2019-04-25 DIAGNOSIS — T81.49XA SURGICAL SITE INFECTION: Primary | ICD-10-CM

## 2019-04-25 NOTE — PROGRESS NOTES
Clinic Care Coordination Contact    Situation: Patient chart reviewed by care coordinator.    Background: reviewed 4-23-19 infectious disease office visit note    Assessment: left ankle synovectomy wound infected by MSSA. Incision closed but remains on antibiotics due to missed doses. Did have interim hospitalization due to JOSE JUAN but creatinine now has normalized.     Plan/Recommendations: continue antibiotics as prescribed until 5-1-19. Follow up with ID specialist if not improving. Follow up with patient in one month    Teresa Cam RN  [facility]-Care Coordination  [facility]  [phone]

## 2019-04-26 ENCOUNTER — HOME INFUSION (PRE-WILLOW HOME INFUSION) (OUTPATIENT)
Dept: PHARMACY | Facility: CLINIC | Age: 24
End: 2019-04-26

## 2019-04-26 ENCOUNTER — PATIENT OUTREACH (OUTPATIENT)
Dept: CARE COORDINATION | Facility: CLINIC | Age: 24
End: 2019-04-26

## 2019-04-26 ENCOUNTER — TELEPHONE (OUTPATIENT)
Dept: GASTROENTEROLOGY | Facility: CLINIC | Age: 24
End: 2019-04-26

## 2019-04-26 ENCOUNTER — HOSPITAL ENCOUNTER (EMERGENCY)
Facility: CLINIC | Age: 24
Discharge: HOME | End: 2019-04-27
Attending: INTERNAL MEDICINE | Admitting: INTERNAL MEDICINE
Payer: COMMERCIAL

## 2019-04-26 ENCOUNTER — APPOINTMENT (OUTPATIENT)
Dept: GENERAL RADIOLOGY | Facility: CLINIC | Age: 24
End: 2019-04-26
Attending: INTERNAL MEDICINE
Payer: COMMERCIAL

## 2019-04-26 DIAGNOSIS — R05.9 COUGH: ICD-10-CM

## 2019-04-26 DIAGNOSIS — R00.0 SINUS TACHYCARDIA: ICD-10-CM

## 2019-04-26 DIAGNOSIS — R79.89 ELEVATED D-DIMER: ICD-10-CM

## 2019-04-26 DIAGNOSIS — M79.662 PAIN OF LEFT LOWER LEG: ICD-10-CM

## 2019-04-26 DIAGNOSIS — T81.40XD POSTOPERATIVE INFECTION, UNSPECIFIED TYPE, SUBSEQUENT ENCOUNTER: ICD-10-CM

## 2019-04-26 LAB
ALBUMIN SERPL-MCNC: 3.2 G/DL (ref 3.4–5)
ALP SERPL-CCNC: 84 U/L (ref 40–150)
ALT SERPL W P-5'-P-CCNC: 21 U/L (ref 0–50)
ANION GAP SERPL CALCULATED.3IONS-SCNC: 7 MMOL/L (ref 3–14)
AST SERPL W P-5'-P-CCNC: 18 U/L (ref 0–45)
BASOPHILS # BLD AUTO: 0 10E9/L (ref 0–0.2)
BASOPHILS NFR BLD AUTO: 0.3 %
BILIRUB SERPL-MCNC: 0.2 MG/DL (ref 0.2–1.3)
BUN SERPL-MCNC: 13 MG/DL (ref 7–30)
CALCIUM SERPL-MCNC: 8.3 MG/DL (ref 8.5–10.1)
CHLORIDE SERPL-SCNC: 106 MMOL/L (ref 94–109)
CO2 SERPL-SCNC: 26 MMOL/L (ref 20–32)
CREAT SERPL-MCNC: 1.02 MG/DL (ref 0.52–1.04)
DIFFERENTIAL METHOD BLD: ABNORMAL
EOSINOPHIL # BLD AUTO: 0.1 10E9/L (ref 0–0.7)
EOSINOPHIL NFR BLD AUTO: 3.9 %
ERYTHROCYTE [DISTWIDTH] IN BLOOD BY AUTOMATED COUNT: 12.7 % (ref 10–15)
GFR SERPL CREATININE-BSD FRML MDRD: 77 ML/MIN/{1.73_M2}
GLUCOSE SERPL-MCNC: 147 MG/DL (ref 70–99)
HCT VFR BLD AUTO: 29.5 % (ref 35–47)
HGB BLD-MCNC: 9.7 G/DL (ref 11.7–15.7)
IMM GRANULOCYTES # BLD: 0 10E9/L (ref 0–0.4)
IMM GRANULOCYTES NFR BLD: 0 %
LYMPHOCYTES # BLD AUTO: 2.1 10E9/L (ref 0.8–5.3)
LYMPHOCYTES NFR BLD AUTO: 58.7 %
MCH RBC QN AUTO: 28.9 PG (ref 26.5–33)
MCHC RBC AUTO-ENTMCNC: 32.9 G/DL (ref 31.5–36.5)
MCV RBC AUTO: 88 FL (ref 78–100)
MONOCYTES # BLD AUTO: 0.2 10E9/L (ref 0–1.3)
MONOCYTES NFR BLD AUTO: 5 %
NEUTROPHILS # BLD AUTO: 1.2 10E9/L (ref 1.6–8.3)
NEUTROPHILS NFR BLD AUTO: 32.1 %
NRBC # BLD AUTO: 0 10*3/UL
NRBC BLD AUTO-RTO: 0 /100
NT-PROBNP SERPL-MCNC: 35 PG/ML (ref 0–450)
PLATELET # BLD AUTO: 193 10E9/L (ref 150–450)
POTASSIUM SERPL-SCNC: 4.3 MMOL/L (ref 3.4–5.3)
PROT SERPL-MCNC: 6.4 G/DL (ref 6.8–8.8)
RBC # BLD AUTO: 3.36 10E12/L (ref 3.8–5.2)
SODIUM SERPL-SCNC: 139 MMOL/L (ref 133–144)
TROPONIN I SERPL-MCNC: <0.015 UG/L (ref 0–0.04)
WBC # BLD AUTO: 3.6 10E9/L (ref 4–11)

## 2019-04-26 PROCEDURE — 84484 ASSAY OF TROPONIN QUANT: CPT | Performed by: INTERNAL MEDICINE

## 2019-04-26 PROCEDURE — 99285 EMERGENCY DEPT VISIT HI MDM: CPT | Mod: 25 | Performed by: INTERNAL MEDICINE

## 2019-04-26 PROCEDURE — 85379 FIBRIN DEGRADATION QUANT: CPT | Performed by: INTERNAL MEDICINE

## 2019-04-26 PROCEDURE — 85025 COMPLETE CBC W/AUTO DIFF WBC: CPT | Performed by: INTERNAL MEDICINE

## 2019-04-26 PROCEDURE — 93005 ELECTROCARDIOGRAM TRACING: CPT | Performed by: INTERNAL MEDICINE

## 2019-04-26 PROCEDURE — 93010 ELECTROCARDIOGRAM REPORT: CPT | Mod: Z6 | Performed by: INTERNAL MEDICINE

## 2019-04-26 PROCEDURE — 80053 COMPREHEN METABOLIC PANEL: CPT | Performed by: INTERNAL MEDICINE

## 2019-04-26 PROCEDURE — 83880 ASSAY OF NATRIURETIC PEPTIDE: CPT | Performed by: INTERNAL MEDICINE

## 2019-04-26 PROCEDURE — 71045 X-RAY EXAM CHEST 1 VIEW: CPT

## 2019-04-26 ASSESSMENT — ENCOUNTER SYMPTOMS
PALPITATIONS: 0
COLOR CHANGE: 0
SHORTNESS OF BREATH: 0
DIFFICULTY URINATING: 0
ARTHRALGIAS: 0
EYE REDNESS: 0
FEVER: 0
CONFUSION: 0
HEADACHES: 0
NECK STIFFNESS: 0
ABDOMINAL PAIN: 0

## 2019-04-26 NOTE — TELEPHONE ENCOUNTER
Spoke to patient reminding of appointment scheduled on 5/1/19 at 10am with [facility]. Patient to arrive 15 min early. To reschedule or cancel patient to call [phone].    KRISHNA Champion

## 2019-04-26 NOTE — ED AVS SNAPSHOT
[facility], [place], Emergency Department  [address]  [facility] [address]  Phone:  [phone]  Fax:  [phone]                                    Samara Oropeza   MRN: [id number]    Department:  [facility], Emergency Department   Date of Visit:  4/26/2019           After Visit Summary Signature Page    I have received my discharge instructions, and my questions have been answered. I have discussed any challenges I see with this plan with the nurse or doctor.    ..........................................................................................................................................  Patient/Patient Representative Signature      ..........................................................................................................................................  Patient Representative Print Name and Relationship to Patient    ..................................................               ................................................  Date                                   Time    ..........................................................................................................................................  Reviewed by Signature/Title    ...................................................              ..............................................  Date                                               Time          22EPIC Rev 08/18

## 2019-04-27 ENCOUNTER — APPOINTMENT (OUTPATIENT)
Dept: ULTRASOUND IMAGING | Facility: CLINIC | Age: 24
End: 2019-04-27
Attending: INTERNAL MEDICINE
Payer: COMMERCIAL

## 2019-04-27 ENCOUNTER — APPOINTMENT (OUTPATIENT)
Dept: CT IMAGING | Facility: CLINIC | Age: 24
End: 2019-04-27
Attending: INTERNAL MEDICINE
Payer: COMMERCIAL

## 2019-04-27 VITALS
BODY MASS INDEX: 27.28 KG/M2 | TEMPERATURE: 97.7 F | OXYGEN SATURATION: 100 % | RESPIRATION RATE: 16 BRPM | WEIGHT: 154 LBS | SYSTOLIC BLOOD PRESSURE: 138 MMHG | DIASTOLIC BLOOD PRESSURE: 97 MMHG | HEART RATE: 76 BPM

## 2019-04-27 LAB
D DIMER PPP FEU-MCNC: 1.8 UG/ML FEU (ref 0–0.5)
INTERPRETATION ECG - MUSE: NORMAL

## 2019-04-27 PROCEDURE — 25000128 H RX IP 250 OP 636: Performed by: INTERNAL MEDICINE

## 2019-04-27 PROCEDURE — 93971 EXTREMITY STUDY: CPT | Mod: LT

## 2019-04-27 PROCEDURE — 25000125 ZZHC RX 250: Performed by: INTERNAL MEDICINE

## 2019-04-27 PROCEDURE — 71260 CT THORAX DX C+: CPT

## 2019-04-27 RX ORDER — IOPAMIDOL 755 MG/ML
100 INJECTION, SOLUTION INTRAVASCULAR ONCE
Status: COMPLETED | OUTPATIENT
Start: 2019-04-27 | End: 2019-04-27

## 2019-04-27 RX ORDER — DIPHENHYDRAMINE HYDROCHLORIDE 50 MG/ML
25 INJECTION INTRAMUSCULAR; INTRAVENOUS ONCE
Status: COMPLETED | OUTPATIENT
Start: 2019-04-27 | End: 2019-04-27

## 2019-04-27 RX ADMIN — SODIUM CHLORIDE 76 ML: 9 INJECTION, SOLUTION INTRAVENOUS at 00:56

## 2019-04-27 RX ADMIN — SODIUM CHLORIDE 1000 ML: 9 INJECTION, SOLUTION INTRAVENOUS at 01:28

## 2019-04-27 RX ADMIN — IOPAMIDOL 55 ML: 755 INJECTION, SOLUTION INTRAVENOUS at 00:55

## 2019-04-27 RX ADMIN — DIPHENHYDRAMINE HYDROCHLORIDE 25 MG: 50 INJECTION, SOLUTION INTRAMUSCULAR; INTRAVENOUS at 00:50

## 2019-04-27 NOTE — ED PROVIDER NOTES
History     Chief Complaint   Patient presents with     Abnormal Labs     Was at urgent care [facility], called to return here to further work up elevated d dimer and elevated kidney function tests.     The history is provided by the patient and medical records.     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 She also has a medical history significant for tourette's, seizures, irregular heart beat, gastroparesis, and GERD. She presents to the Emergency Department for evaluation of abnormal labs.    Per chart review, patient was seen at [facility] earlier today. D-dimer came back positive. Patient was instructed to visit the ED for further evaluation.     Here, patient reports that she was seen earlier at [facility] for left lower extremity pain and cold symptoms. She notes that she was discharged; however, received a call later that her d-dimer came back positive and creatine is elevated. Currently, she complains of left lower extremity pain and cold symptoms, but nothing severe. She denies chest pain or palpitation. She denies shortness of breath. She denies a history of diabetes. She denies being on chronic steroids.     Past Medical History:   Diagnosis Date     Anemia      Anxiety      Arthritis      Behcet's disease (H)      Cervical adenitis May 2010     Chronic abdominal pain      Constipation, chronic 1994     Fibromyalgia      Gastro-oesophageal reflux disease      Gastroparesis      Irregular heart beat     tachycardia, has had workup     Migraines      Neuromuscular disorder (H)     fibramyalgia     Palpitations      Seizure (H)      Seizures (H)     unknown etiology     Syncope      Tourette's        Past Surgical History:   Procedure  Laterality Date     ARTHROSCOPY ANKLE, REPAIR LIGAMENT Left 1/2/2019    Procedure: Ankle arthroscopy and sinus tarsi evacuation, ligament repair, left lower extremity;  Surgeon: Andres Johnson DPM;  Location: [facility] OR     ARTHROSCOPY KNEE WITH PATELLAR REALIGNMENT  7/25/2013    Procedure: ARTHROSCOPY KNEE WITH PATELLAR REALIGNMENT;  Left Knee Arthroscopy, Medial Patellofemoral Ligament Reconstruction with Allograft  ;  Surgeon: Jennifer Acevedo MD;  Location: [facility] OR     COLONOSCOPY  2015     DENTAL SURGERY  1996    Teeth removal     ENDOSCOPY UPPER, COLONOSCOPY, COMBINED  2005     HC ESOPH/GAS REFLUX TEST W NASAL IMPED >1 HR N/A 2/15/2017    Procedure: ESOPHAGEAL IMPEDENCE FUNCTION TEST WITH 24 HOUR PH GREATER THAN 1 HOUR;  Surgeon: Timothy Matta MD;  Location: [facility]     IR PICC PLACEMENT > 5 YRS OF AGE  3/13/2019     IRRIGATION AND DEBRIDEMENT FOOT, COMBINED Left 3/12/2019    Procedure: COMBINED IRRIGATION AND DEBRIDEMENT LEFT ANKLE;  Surgeon: Micha Glover MD;  Location: [facility] OR       Family History   Problem Relation Age of Onset     Depression Mother      Neurologic Disorder Mother         Migraines, take imitrex injection.  Also in maternal grandmother.       Alcohol/Drug Father      Hypertension Father      Depression Father      Osteoarthritis Father      Cardiovascular Maternal Grandmother      Depression Maternal Grandmother      Hypertension Maternal Grandmother      Alzheimer Disease Maternal Grandmother      Cardiovascular Maternal Grandfather      Hypertension Maternal Grandfather      Depression Maternal Grandfather      Alcohol/Drug Maternal Grandfather      Cardiovascular Paternal Grandmother      Hypertension Paternal Grandmother      Cardiovascular Paternal Grandfather      Hypertension Paternal Grandfather      Glaucoma No family hx of      Macular Degeneration No family hx of        Social History     Tobacco Use     Smoking status: Never Smoker     Smokeless tobacco:  Never Used   Substance Use Topics     Alcohol use: Yes     Alcohol/week: 0.6 oz     Types: 1 Standard drinks or equivalent per week     Comment: rarely       Current Facility-Administered Medications   Medication     0.9% sodium chloride BOLUS     heparin lock flush 10 UNIT/ML injection 5 mL     Current Outpatient Medications   Medication     albuterol (PROAIR HFA/PROVENTIL HFA/VENTOLIN HFA) 108 (90 BASE) MCG/ACT Inhaler     amitriptyline (ELAVIL) 25 MG tablet     artificial tears OINT ophthalmic ointment     aspirin (ASPIRIN CHILDRENS) 81 MG chewable tablet     benzocaine (TOPICALE XTRA) 20 % GEL     betamethasone valerate (VALISONE) 0.1 % cream     bisacodyl (DULCOLAX) 5 MG EC tablet     citalopram (CELEXA) 10 MG tablet     clobetasol (TEMOVATE) 0.05 % cream     colchicine (COLCYRS) 0.6 MG tablet     cyproheptadine (PERIACTIN) 4 MG tablet     dicyclomine (BENTYL) 20 MG tablet     diphenhydrAMINE (BENADRYL) 25 MG tablet     EPINEPHrine (EPIPEN 2-EAMON) 0.3 MG/0.3ML injection     gabapentin (NEURONTIN) 100 MG capsule     guanFACINE (TENEX) 1 MG tablet     hydrocortisone 1 % CREA cream     hyoscyamine (ANASPAZ/LEVSIN) 0.125 MG tablet     hypromellose (GENTEAL) 0.3 % SOLN     lactulose 20 GM/30ML SOLN     lidocaine (LMX4) 4 % external cream     linaclotide (LINZESS) 145 MCG capsule     LORazepam (ATIVAN) 0.5 MG tablet     Magic Mouthwash (FV std formula) lidocaine visc 2% 2.5mL/5mL & maalox/mylanta w/ simeth 2.5mL/5mL & diphenhydrAMINE 5mg/5mL     nitroFURantoin macrocrystal-monohydrate (MACROBID) 100 MG capsule     norgestimate-ethinyl estradiol (ORTHO-CYCLEN/SPRINTEC) 0.25-35 MG-MCG tablet     omeprazole (PRILOSEC) 40 MG capsule     OnabotulinumtoxinA (BOTOX IJ)     ondansetron (ZOFRAN-ODT) 8 MG ODT tab     order for DME     order for DME     order for DME     order for DME     polyethylene glycol (MIRALAX/GLYCOLAX) powder     sennosides (SENOKOT) 8.6 MG tablet     sodium chloride 0.9% SOLN BOLUS     SPRINTEC 28  0.25-35 MG-MCG tablet     sucralfate (CARAFATE) 1 GM/10ML suspension        Allergies   Allergen Reactions     Amoxil [Penicillins] Rash     Dad unsure of reaction.     Bee Venom Anaphylaxis     Contrast Dye Rash     Contrast Media Ready-Box [facility] [place], 04/09/2014.; Contrast [facility] Ready-[facility] [place], 04/09/2014.  NOTE: this is a contrast media oral with iodine. Premedicate with methylpred standard for IV contrast, request barium contrast for oral contrast.     Orange Fruit [Citrus] Anaphylaxis     Pineapple Anaphylaxis, Difficulty breathing and Rash     Reglan [Metoclopramide] Other (See Comments)     IV dose only, in ER, rapid heart rate.     Ace Inhibitors      Difficulty in breathing and GI upset     Amitiza [Lubiprostone] Nausea and Vomiting     Amoxicillin-Pot Clavulanate      Midazolam Unknown     Other reaction(s): Unknown  parent states that when pt takes this medication, she wakes up being very violent .  parent states that when pt takes this medication, she wakes up being very violent .     No Clinical Screening - See Comments      Bleech/ chest tightness, itchy throat, swollen tongue, hives     Tizanidine Other (See Comments)     Confusion, back pain, photophobia, abdominal pain, shaking, anxious       Versed      Coming out of pelvic exam at age of 5, was kicking and screaming when coming out of the versed.     Adhesive Tape Rash     Azithromycin Hives and Rash     Cephalexin Itching and Rash     Itchy mouth  Itchy mouth     Keflex [Cephalexin-Fd&C Yellow #6] Hives       I have reviewed the Medications, Allergies, Past Medical and Surgical History, and Social History in the Epic system.    Review of Systems   Constitutional: Negative for fever.   HENT: Negative for congestion.         Positive for cold symptoms   Eyes: Negative for redness.   Respiratory: Negative for shortness of breath.    Cardiovascular: Negative for chest pain and palpitations.   Gastrointestinal: Negative for abdominal pain.   Genitourinary:  Negative for difficulty urinating.   Musculoskeletal: Negative for arthralgias and neck stiffness.        Positive for left lower extremity pain   Skin: Negative for color change.   Neurological: Negative for headaches.   Psychiatric/Behavioral: Negative for confusion.   All other systems reviewed and are negative.      Physical Exam   BP: 130/81  Pulse: 105  Heart Rate: 105  Temp: 97.7  F (36.5  C)  Resp: 18  Weight: 69.9 kg (154 lb)  SpO2: 98 %      Physical Exam   Constitutional: No distress.   HENT:   Head: Atraumatic.   Mouth/Throat: Oropharynx is clear and moist. No oropharyngeal exudate.   Eyes: Pupils are equal, round, and reactive to light. No scleral icterus.   Neck: Neck supple. No JVD present.   Cardiovascular: Regular rhythm, normal heart sounds and intact distal pulses. Tachycardia present. Exam reveals no gallop and no friction rub.   No murmur heard.  Pulmonary/Chest: Effort normal and breath sounds normal. No stridor. No respiratory distress. She has no wheezes. She has no rales. She exhibits no tenderness.   Abdominal: Soft. Bowel sounds are normal. She exhibits no distension and no mass. There is no tenderness. There is no rebound and no guarding. No hernia.   Musculoskeletal: She exhibits no edema or tenderness.   Neurological: She is alert. No cranial nerve deficit.   Skin: Skin is warm. No rash noted. She is not diaphoretic.       ED Course        Procedures             EKG Interpretation:      Interpreted by Ruben Suarez MD  Time reviewed: 22:52  Symptoms at time of EKG: Abnormal labs   Rhythm: normal sinus   Rate: 77 BPM  Axis: Normal  Ectopy: none  Conduction: normal  ST Segments/ T Waves: No ST-T wave changes and No acute ischemic changes  Q Waves: none  Comparison to prior: No significant changes from 3/30/19    Clinical Impression: no acute change                Critical Care time:  none         Results for orders placed or performed during the hospital encounter of 04/26/19 (from the  past 24 hour(s))   XR Chest Port 1 View     Status: None    Collection Time: 04/26/19 11:07 PM    Narrative    XR CHEST PORT 1 VW 4/26/2019 11:07 PM    HISTORY: Cough.    COMPARISON: 3/30/2019    FINDINGS: No airspace consolidation, pleural effusion or pneumothorax.  Normal heart size. Right PICC line tip is in the projection of the  SVC.      Impression    IMPRESSION: No acute pulmonary abnormality.    ANIA DILLARD MD   CBC with platelets differential     Status: Abnormal    Collection Time: 04/26/19 11:14 PM   Result Value Ref Range    WBC 3.6 (L) 4.0 - 11.0 10e9/L    RBC Count 3.36 (L) 3.8 - 5.2 10e12/L    Hemoglobin 9.7 (L) 11.7 - 15.7 g/dL    Hematocrit 29.5 (L) 35.0 - 47.0 %    MCV 88 78 - 100 fl    MCH 28.9 26.5 - 33.0 pg    MCHC 32.9 31.5 - 36.5 g/dL    RDW 12.7 10.0 - 15.0 %    Platelet Count 193 150 - 450 10e9/L    Diff Method Automated Method     % Neutrophils 32.1 %    % Lymphocytes 58.7 %    % Monocytes 5.0 %    % Eosinophils 3.9 %    % Basophils 0.3 %    % Immature Granulocytes 0.0 %    Nucleated RBCs 0 0 /100    Absolute Neutrophil 1.2 (L) 1.6 - 8.3 10e9/L    Absolute Lymphocytes 2.1 0.8 - 5.3 10e9/L    Absolute Monocytes 0.2 0.0 - 1.3 10e9/L    Absolute Eosinophils 0.1 0.0 - 0.7 10e9/L    Absolute Basophils 0.0 0.0 - 0.2 10e9/L    Abs Immature Granulocytes 0.0 0 - 0.4 10e9/L    Absolute Nucleated RBC 0.0    D dimer quantitative     Status: Abnormal    Collection Time: 04/26/19 11:14 PM   Result Value Ref Range    D Dimer 1.8 (H) 0.0 - 0.50 ug/ml FEU   Comprehensive metabolic panel     Status: Abnormal    Collection Time: 04/26/19 11:14 PM   Result Value Ref Range    Sodium 139 133 - 144 mmol/L    Potassium 4.3 3.4 - 5.3 mmol/L    Chloride 106 94 - 109 mmol/L    Carbon Dioxide 26 20 - 32 mmol/L    Anion Gap 7 3 - 14 mmol/L    Glucose 147 (H) 70 - 99 mg/dL    Urea Nitrogen 13 7 - 30 mg/dL    Creatinine 1.02 0.52 - 1.04 mg/dL    GFR Estimate 77 >60 mL/min/[1.73_m2]    GFR Estimate If Black 89 >60  mL/min/[1.73_m2]    Calcium 8.3 (L) 8.5 - 10.1 mg/dL    Bilirubin Total 0.2 0.2 - 1.3 mg/dL    Albumin 3.2 (L) 3.4 - 5.0 g/dL    Protein Total 6.4 (L) 6.8 - 8.8 g/dL    Alkaline Phosphatase 84 40 - 150 U/L    ALT 21 0 - 50 U/L    AST 18 0 - 45 U/L   Nt probnp inpatient (BNP)     Status: None    Collection Time: 04/26/19 11:14 PM   Result Value Ref Range    N-Terminal Pro BNP Inpatient 35 0 - 450 pg/mL   Troponin I     Status: None    Collection Time: 04/26/19 11:14 PM   Result Value Ref Range    Troponin I ES <0.015 0.000 - 0.045 ug/L   US Lower Extremity Venous Duplex Left     Status: None    Collection Time: 04/27/19 12:19 AM    Narrative    LEFT LOWER EXTREMITY VENOUS DOPPLER 4/27/2019 12:19 AM    HISTORY: Left leg pain and numbness.    COMPARISON: None.    TECHNIQUE: Color flow and spectral Doppler with waveform analysis  performed.    FINDINGS: Left common femoral, superficial femoral, and popliteal  veins are well seen and appear normal. They have normal patency and  compressibility. Deep veins of the calf are well seen and appear  normal. No evidence for deep venous thrombosis.      Impression    IMPRESSION: No evidence of DVT.     ANIA DILLARD MD   CT Chest Pulmonary Embolism w Contrast     Status: None    Collection Time: 04/27/19  1:13 AM    Narrative    CT CHEST PULMONARY EMBOLISM W CONTRAST 4/27/2019 1:13 AM    HISTORY: PE suspected, intermediate prob, positive D-dimer. Ankle  surgery January 2019 with subsequent infection.    COMPARISON: None.    TECHNIQUE:  Chest CT was performed following intravenous  administration of 55 mls of Isov 370.  Radiation dose for this scan  was reduced using automated exposure control, adjustment of the mA  and/or kV according to patient size, or iterative reconstruction  technique.    FINDINGS: No pulmonary embolus. No evidence of aortic dissection.    Lungs are clear. No pleural effusion or pneumothorax.    Normal heart size. No mediastinal, hilar or axillary  adenopathy.    No acute abnormality in the visualized upper abdomen.      Impression    IMPRESSION: No pulmonary embolus or other acute pulmonary abnormality.    ANIA DILLARD MD           Labs Ordered and Resulted from Time of ED Arrival Up to the Time of Departure from the ED   CBC WITH PLATELETS DIFFERENTIAL - Abnormal; Notable for the following components:       Result Value    WBC 3.6 (*)     RBC Count 3.36 (*)     Hemoglobin 9.7 (*)     Hematocrit 29.5 (*)     Absolute Neutrophil 1.2 (*)     All other components within normal limits   D DIMER QUANTITATIVE - Abnormal; Notable for the following components:    D Dimer 1.8 (*)     All other components within normal limits   COMPREHENSIVE METABOLIC PANEL - Abnormal; Notable for the following components:    Glucose 147 (*)     Calcium 8.3 (*)     Albumin 3.2 (*)     Protein Total 6.4 (*)     All other components within normal limits   NT PROBNP INPATIENT   TROPONIN I   ROUTINE UA WITH MICROSCOPIC   CARDIAC CONTINUOUS MONITORING            Assessments & Plan (with Medical Decision Making)  Moderately elevated D Dimer at [facility] and referred here, repeat still moderately elevated, leg US neg for DVT, CT chest neg PE, probably related to infection and PICC line.  Tachycardia chronic as 4/2019 Ortho visit here with  probably due to combination of TCA and infection.  Cough agree with most likely URI, left leg pain radiculopathy. Follow up with her PMD.       I have reviewed the nursing notes.    I have reviewed the findings, diagnosis, plan and need for follow up with the patient.       Medication List      There are no discharge medications for this visit.         Final diagnoses:   Elevated d-dimer   Pain of left lower leg   Postoperative infection, unspecified type, subsequent encounter   Cough   Sinus tachycardia - chronic     I, Noa Brennan, am serving as a trained medical scribe to document services personally performed by Ruben Suarez MD, based  on the provider's statements to me.      I, Ruben Suarez MD, was physically present and have reviewed and verified the accuracy of this note documented by Noa Brennan.     4/26/2019   [facility], [place], EMERGENCY DEPARTMENT     Ruben Suarez MD  04/27/19 0142

## 2019-04-29 ENCOUNTER — PATIENT OUTREACH (OUTPATIENT)
Dept: CARE COORDINATION | Facility: CLINIC | Age: 24
End: 2019-04-29

## 2019-04-29 DIAGNOSIS — Z00.00 ROUTINE GENERAL MEDICAL EXAMINATION AT A HEALTH CARE FACILITY: Primary | ICD-10-CM

## 2019-04-29 NOTE — PROGRESS NOTES
This is a recent snapshot of the patient's [facility] medical record.  For current drug dose and complete information and questions, call [phone] or In [facility] pool, [facility] home infusion ([id number])  CSN Number:  [id number]

## 2019-04-29 NOTE — PROGRESS NOTES
Clinic Care Coordination Contact    Clinic Care Coordination Contact  OUTREACH    Referral Information:  Referral Source: [employer] Outreach    Primary Diagnosis: Psychosocial    Chief Complaint   Patient presents with     Clinic Care Coordination - Post Hospital     RN        [place]ma Utilization:   Clinic Utilization  Difficulty keeping appointments:: No  Compliance Concerns: No  No-Show Concerns: No  No PCP office visit in Past Year: No  Utilization    Last refreshed: 4/29/2019  8:34 AM:  Hospital Admissions 2           Last refreshed: 4/29/2019  8:34 AM:  ED Visits 3           Last refreshed: 4/29/2019  8:34 AM:  No Show Count (past year) 4              Current as of: 4/29/2019  8:34 AM              Clinical Concerns:  Current Medical Concerns:  Went to urgent care with cold symptoms. Thought she had a UTI. Had calf pain with positive Ddimer. She was started on antibiotic for UTI but was called by urgent care and told she did not have a UTI and to stop septra, which she did    Current Behavioral Concerns: none    Education Provided to patient: none      Health Maintenance Reviewed:    Clinical Pathway: None    Medication Management:  Stopped septra as instructed    Functional Status:  Bed or wheelchair confined:: No    Living Situation:  Current living arrangement:: I live in a private home  Type of residence:: Private home - stairs    Diet/Exercise/Sleep:       Transportation:  Transportation concerns (GOAL):: No        Psychosocial:  Presybeterian or spiritual beliefs that impact treatment:: No  Mental health DX:: Yes  Mental health DX how managed:: Medication, Psychiatrist  Mental health management concern (GOAL):: No  Informal Support system:: Significant other     Financial/Insurance:   Financial/Insurance concerns (GOAL):: Yes  Working with      Resources and Interventions:  Current Resources:    ;         Equipment Currently Used at Home: crutches    Advance Care Plan/Directive  Advanced Care  Plans/Directives on file:: No    Referrals Placed: Disability Linkage line(sent information to patient )     Goals:   Goals        General    #1 Financial Wellbeing (pt-stated)     Notes - Note created  3/28/2019  2:58 PM by Maeve Purcell BSW    Goal Statement: I need help applying for disability    Measure of Success: Pt will apply for disability   Supportive Steps to Achieve: care coordination   Barriers: unable to navigate on own  Strengths: seeking help  Date to Achieve By: 5/1/19   Patient expressed understanding of goal: yes                  Patient/Caregiver understanding: good    Outreach Frequency: monthly  Future Appointments              In 2 days Sandhya Encarnacion PA-C [facility], [facility]    In 2 days Alejandrina Hernandez MD [facility] Physical Medicine and Rehabilitation, [facility]    In 1 week Rosangela Hollingsworth, [facility] MTM, [facility]    In 1 week Ernestina Patel, [facility], [facility] [place]    In 1 week [facility] MA VISIT [facility], [facility]    In 1 week Po Chinchilla [facility], [facility]    In 1 week Mary Kay Garcia DO [facility], [facility]    In 1 week Rosangela Hollingsworth, [facility] MT, [facility]    In 4 weeks Ernestina Patel [facility] [place]    In 2 months Austen Marquez MD [facility],     In 6 months Joseph De La Torre MD [facility]          Plan: reviewed with patient. Feeling better. Stopped septra as instructed. Has appointment in one week to establish care with [facility]. Asked her to keep this appointment. Will attempt to meet with patient at appointment. She agrees with plan.

## 2019-04-30 ENCOUNTER — HOME INFUSION (PRE-WILLOW HOME INFUSION) (OUTPATIENT)
Dept: PHARMACY | Facility: CLINIC | Age: 24
End: 2019-04-30

## 2019-04-30 ENCOUNTER — MEDICAL CORRESPONDENCE (OUTPATIENT)
Dept: HEALTH INFORMATION MANAGEMENT | Facility: CLINIC | Age: 24
End: 2019-04-30

## 2019-04-30 LAB
ALT SERPL W P-5'-P-CCNC: 19 U/L (ref 0–50)
AST SERPL W P-5'-P-CCNC: 14 U/L (ref 0–45)
BASOPHILS # BLD AUTO: 0 10E9/L (ref 0–0.2)
BASOPHILS NFR BLD AUTO: 0.2 %
BUN SERPL-MCNC: 11 MG/DL (ref 7–30)
CK SERPL-CCNC: 97 U/L (ref 30–225)
CREAT SERPL-MCNC: 0.78 MG/DL (ref 0.52–1.04)
CRP SERPL-MCNC: <2.9 MG/L (ref 0–8)
DIFFERENTIAL METHOD BLD: ABNORMAL
EOSINOPHIL # BLD AUTO: 0.1 10E9/L (ref 0–0.7)
EOSINOPHIL NFR BLD AUTO: 2.4 %
ERYTHROCYTE [DISTWIDTH] IN BLOOD BY AUTOMATED COUNT: 12.5 % (ref 10–15)
GFR SERPL CREATININE-BSD FRML MDRD: >90 ML/MIN/{1.73_M2}
HCT VFR BLD AUTO: 30.7 % (ref 35–47)
HGB BLD-MCNC: 10.1 G/DL (ref 11.7–15.7)
IMM GRANULOCYTES # BLD: 0 10E9/L (ref 0–0.4)
IMM GRANULOCYTES NFR BLD: 0 %
LYMPHOCYTES # BLD AUTO: 2.2 10E9/L (ref 0.8–5.3)
LYMPHOCYTES NFR BLD AUTO: 44 %
MCH RBC QN AUTO: 28.5 PG (ref 26.5–33)
MCHC RBC AUTO-ENTMCNC: 32.9 G/DL (ref 31.5–36.5)
MCV RBC AUTO: 87 FL (ref 78–100)
MONOCYTES # BLD AUTO: 0.3 10E9/L (ref 0–1.3)
MONOCYTES NFR BLD AUTO: 5.8 %
NEUTROPHILS # BLD AUTO: 2.4 10E9/L (ref 1.6–8.3)
NEUTROPHILS NFR BLD AUTO: 47.6 %
NRBC # BLD AUTO: 0 10*3/UL
NRBC BLD AUTO-RTO: 0 /100
PLATELET # BLD AUTO: 216 10E9/L (ref 150–450)
RBC # BLD AUTO: 3.54 10E12/L (ref 3.8–5.2)
WBC # BLD AUTO: 5 10E9/L (ref 4–11)

## 2019-04-30 PROCEDURE — 82550 ASSAY OF CK (CPK): CPT | Performed by: INTERNAL MEDICINE

## 2019-04-30 PROCEDURE — 84450 TRANSFERASE (AST) (SGOT): CPT | Performed by: INTERNAL MEDICINE

## 2019-04-30 PROCEDURE — 82565 ASSAY OF CREATININE: CPT | Performed by: INTERNAL MEDICINE

## 2019-04-30 PROCEDURE — 86140 C-REACTIVE PROTEIN: CPT | Performed by: INTERNAL MEDICINE

## 2019-04-30 PROCEDURE — 84520 ASSAY OF UREA NITROGEN: CPT | Performed by: INTERNAL MEDICINE

## 2019-04-30 PROCEDURE — 84460 ALANINE AMINO (ALT) (SGPT): CPT | Performed by: INTERNAL MEDICINE

## 2019-04-30 PROCEDURE — 85025 COMPLETE CBC W/AUTO DIFF WBC: CPT | Performed by: INTERNAL MEDICINE

## 2019-04-30 NOTE — PROGRESS NOTES
Clinic Care Coordination Contact  Care Team Conversations    See other encounter  Teresa Cam, RN  [facility]-Care Coordination  [facility]  [phone]

## 2019-05-01 ENCOUNTER — OFFICE VISIT (OUTPATIENT)
Dept: PHYSICAL MEDICINE AND REHAB | Facility: CLINIC | Age: 24
End: 2019-05-01
Payer: COMMERCIAL

## 2019-05-01 ENCOUNTER — HOME INFUSION (PRE-WILLOW HOME INFUSION) (OUTPATIENT)
Dept: PHARMACY | Facility: CLINIC | Age: 24
End: 2019-05-01

## 2019-05-01 ENCOUNTER — OFFICE VISIT (OUTPATIENT)
Dept: GASTROENTEROLOGY | Facility: CLINIC | Age: 24
End: 2019-05-01
Payer: COMMERCIAL

## 2019-05-01 VITALS
HEART RATE: 98 BPM | DIASTOLIC BLOOD PRESSURE: 81 MMHG | SYSTOLIC BLOOD PRESSURE: 109 MMHG | BODY MASS INDEX: 27.46 KG/M2 | OXYGEN SATURATION: 98 % | RESPIRATION RATE: 16 BRPM | TEMPERATURE: 97.9 F | WEIGHT: 155 LBS

## 2019-05-01 VITALS
SYSTOLIC BLOOD PRESSURE: 109 MMHG | HEART RATE: 98 BPM | TEMPERATURE: 97.9 F | DIASTOLIC BLOOD PRESSURE: 81 MMHG | OXYGEN SATURATION: 98 %

## 2019-05-01 DIAGNOSIS — G43.719 CHRONIC MIGRAINE WITHOUT AURA, INTRACTABLE, WITHOUT STATUS MIGRAINOSUS: Primary | ICD-10-CM

## 2019-05-01 DIAGNOSIS — K62.5 BRIGHT RED BLOOD PER RECTUM: ICD-10-CM

## 2019-05-01 DIAGNOSIS — K59.04 CHRONIC IDIOPATHIC CONSTIPATION: Primary | ICD-10-CM

## 2019-05-01 DIAGNOSIS — K31.84 GASTROPARESIS: ICD-10-CM

## 2019-05-01 RX ORDER — BUPIVACAINE HYDROCHLORIDE 2.5 MG/ML
3 INJECTION, SOLUTION EPIDURAL; INFILTRATION; INTRACAUDAL ONCE
Status: COMPLETED | OUTPATIENT
Start: 2019-05-01 | End: 2019-05-01

## 2019-05-01 RX ADMIN — BUPIVACAINE HYDROCHLORIDE 7.5 MG: 2.5 INJECTION, SOLUTION EPIDURAL; INFILTRATION; INTRACAUDAL at 15:28

## 2019-05-01 ASSESSMENT — PAIN SCALES - GENERAL
PAINLEVEL: MODERATE PAIN (5)
PAINLEVEL: EXTREME PAIN (9)

## 2019-05-01 NOTE — LETTER
5/1/2019       RE: Samara Oropeza  [address]  [place] [address]     Dear Colleague,    Thank you for referring your patient, Samara Oropeza, to the [facility] GASTRO[facility] IBD [facility]INIC at [facility] Please see a copy of my visit note below.    GI CLINIC VISIT    CC/REFERRING MD:  Referred Self  REASON FOR CONSULTATION: follow-up    ASSESSMENT/PLAN:  Samara is a 21 yo woman with chronic constipation, chronic left upper quadrant pain and likely mobile cecum presenting for evaluation.  I suspect that her constipation is a root of many of her complaints, including abdominal pain, nausea and vomiting.  She had a normal EGD, cscope. Except for a mobile cecum, CT c/a/p was normal. She also reports having a capsule study that was normal.     1. Constipation  Most recent upper colonoscopy 1/3/2018 with normal colon and terminal ileum without evidence of stricture at this time.  Possible sigmoid stricture seen on CT scan 3/27/1 resolved on follow-up CT scan 3/30/2019. Patient has long-standing history of constipation history of constipation may be worsened in the setting of narcotic use after recent ankle surgery.  Also had findings of abnormal digital rectal exam at last years visit and has not yet followed with pelvic floor center.  Patient has recently been started again on Linzess 145 MCG daily with slight improvement in her symptoms however she continues to be on multiple bowel medications.  We discussed that it may be helpful to increase dose of Linzess and work to decrease doses of other bowel medications.  We discussed that she can continue Dulcolax and MiraLAX as per previous documentation, Linzess to 90 did not improve symptoms.  Would try to decrease lactulose and senna at this time as these medications may cause abdominal cramping.  May need to decrease MiraLAX and Dulcolax if stools become too loose.  If patient becomes constipated and goes 2  to 3 days without a bowel movement, we discussed that she can add back in these medications.  Would also recommend nonurgent referral to the pelvic floor center for evaluation of pelvic floor dysfunction as the patient has failed multiple laxatives.  Patient is on several constipating medications including Zofran, Bentyl, Levsin, and opioid pain medications which are all extremely constipating.  We discussed that if possible she should avoid these medications. Will also obtain prior records to evaluate for history of Hirschsprung's as an infant. Future considerations include treating with Trulance, and potentially colectomy if refractory constipation.   -- start Linzess 290 mcg daily   -- ok to continue Miralax- if stools become too loose, decrease to once a day  -- ok to continue dulocolax   -- try stopping lactulose and senna   -- if you go 2-3 days without a bowel movement    -increase Miralax   -then can add back senna or lactulose   -- pelvic floor center   -- continue to avoid constipating medications if possible (especially zofran, bentyl, levsin if possible)     2. History of gastroparesis   Gastric emptying exam 12/4/2015 notable for 18% at 240 minutes significant for gastroparesis- though unclear if the patient was on narcotic pain medications/moving her bowels at this time. Patient notes some postprandial abdominal pain and bloating, and daily nausea for which she is taking Zofran up to 4 times a day.  We discussed that patient should try to limit Zofran use if possible as this can exacerbate constipation and worsen gastroparesis.  Narcotic medications may also slow gastric emptying.  We discussed that she should try dietary measures including avoiding foods high in fat and fiber, and eating small frequent meals throughout the day.  She can discuss this further with one of our dietitian.    -- do not take zofran more than 3 times a day  -- meet with our dietitian (Jose) to discuss slow gastric  emptying diet and gas producing foods     3. Bright red blood per rectum, anemia   During recent admission, patient described possible hematochezia/red blood with bowel movements. Of note, patient has described this at GI visits in the past (last year after recent normal colonoscopy and upper endoscopy in 1/2018). Source of ?melena/hematochezia may be due to hemhoidal bleeding vs. trauma from attempts to manually disimpact in the setting of severe constipation. May also be due to ulcerations d/t Bechet's syndrome (was not seen on EGD + colon). Of note, patient also had prolonged vaginal bleed which was thought to be related to Bechet's - was previously well controlled with colchicine. Would recommend serial monitoring of hgb through primary care provider. If persistent blood per rectum despite regulation of constipation as outlined above, could consider colorectal surgery evaluation for anoscopy. If drop in hemoglobin, blood pressure, or evidence of hemorrhage, could consider colonoscopy though patient has had recent normal colonoscopy (2018).   -- continue to monitor Hgb through PCP    Return to GI Clinic in 1 month to review your progress.     Sandhya Encarnacion PA-C  Division of Gastroenterology, Hepatology & Nutrition  [facility]  Samara Oropeza is a 23 year old woman with a complex medical history of Bechet's syndrome, s/p left ankle fusion (1/2/19) c/b infection, tourette's seizures, irregular heartbeat, gastroparesis, and GERD presenting for follow-up.     She has previously been seen by Kacie Almeida NP, and Dr. Cuenca and this is my first encounter with the patient. She was most recently seen by Dr. Cuenca 1/17/18. At the time, the patient endorsed constant nausea with specific triggers with vomiting 1-2 times per week (in the setting of eating). Also had LUQ abdominal pain. Was found to have a mobile cecum on workup and has met with surgeons in the past regarding this. Also  "reported to having a bowel movement every 6-14 days and at the time was taking Linzess 290 mcg with minimal improvement- noted dark red blood in the stool. She was then switched to Amitiza 24 mcg daily (with plan to increase as needed to BID) and did not follow-up with GI clinic.     Patient underwent left ankle fusion 1/2/19 Samara was admitted to [facility] 3/30/2019 due to 7 day history of periumbilical abdominal pain and report of 22 days of alternating constipation and diarrhea with maroon stools/hematochezia.  Patient also tried to manually disimpact at this time without success.  CT done 3/27 large colonic stool burden with concern for sigmoid stricture, although after improvement in bowel regimen, stool burden was improved and no stricture/evidence of obstruction was noted. Source of worsening constipation (with probable over-flow diarrhea) thought to be due to increased narcotic pain medication (and likely decreased mobility)  in the setting of recent left ankle fusion.     After the patient was discharged, she met with MTM 4/3/19 who recommended to re-start Linzess 145 mcg and back off bowel regimen.     Today the patient notes on average she is having a bowel movement 2-3 times a week.  States consistency can be very hard, or watery and can alternate between the 2 of them.  Within the last week, pain has improved significantly.  Is currently taking Linzess 145 MCG, Dulcolax at night, lactulose 30 mL 1-3 times a day, smooth move tea before bed, and MiraLAX 2-3 times daily.  Notes that despite this regimen, she feels fullness and discomfort in her rectum and abdomen and does not feel empty with bowel movements.  Can occasionally have blood with bowel movements.    She notes associated bloating and \"extreme pain\" immediately after eating located in her lower abdomen.  Because of this, she is eating less food than before.  Notes that this bloating has been getting worse.  Has visible distention associated with " bloating.  States that certain types of meat can make this worse for instance he can encounter again.  Patient has evening 1-2 meals a day.  She is also taking Bentyl and hyoscyamine on average once each day.    Patient notes regular nausea on a daily basis.  This is a long-standing issue.  On average is taking Zofran 8 mg once a day, however she can take this up to 4 times a day depending on level of nausea.  Notes that GERD symptoms have been pretty well controlled and has been taking omeprazole and Carafate on and off.    ROS:    No fevers or chills  No weight loss  No blurry vision, double vision or change in vision- changes wuickly   + sore throat  + lymphadenopathy  + headache, paraesthesias, or weakness in a limb  + shortness of breath or wheezing  + chest pain or pressure  + arthralgias or myalgias  No rashes or skin changes  No odynophagia or dysphagia- food gets stuck   No current BRBPR, hematochezia, melena  No dysuria, frequency or urgency  + frequent UTIs   No hot/cold intolerance or polyria  + anxiety - started seeing therapist - started citalopram and gabapentin     PROBLEM LIST  Patient Active Problem List    Diagnosis Date Noted     Acute abdominal pain 03/30/2019     Priority: Medium     Cellulitis 03/09/2019     Priority: Medium     Aphthous ulcer 11/13/2018     Priority: Medium     Overview:   Created by Conversion       Cough 11/13/2018     Priority: Medium     Overview:   Created by Conversion       Dysthymic disorder 11/13/2018     Priority: Medium     Overview:   Created by Conversion       Displacement of lumbar intervertebral disc without myelopathy 11/13/2018     Priority: Medium     Overview:   Created by Conversion       Iron deficiency associated with nonfamilial restless legs syndrome 11/13/2018     Priority: Medium     Other specified symptom associated with female genital organs 11/13/2018     Priority: Medium     Overview:   Created by Conversion       Enthesopathy of hip region  11/13/2018     Priority: Medium     Overview:   Created by Conversion       Tourette's syndrome 11/13/2018     Priority: Medium     Overview:   Created by Conversion       Somatic symptom disorder 06/01/2018     Priority: Medium     Pelvic floor weakness 04/25/2018     Priority: Medium     Spells of decreased attentiveness 12/19/2017     Priority: Medium     Mobile cecum 11/09/2017     Priority: Medium     Cecum noted in Right lower quadrant on 4/17 CT scan, and in Left upper Quadrant on CT on 11/2017.       Vitamin D deficiency 10/11/2017     Priority: Medium     How low, unknown/not found. On D when tested at 27. Starting cholecalciferol October 2017. Needs recheck.       Convulsions, unspecified convulsion type (H) 10/03/2017     Priority: Medium     Transient alteration of awareness 10/03/2017     Priority: Medium     Chronic pain syndrome 07/27/2017     Priority: Medium     Major depressive disorder, recurrent episode, moderate (H) 06/27/2017     Priority: Medium     Cervical pain 05/02/2017     Priority: Medium     Acute left ankle pain 03/31/2017     Priority: Medium     Cervical dystonia 03/28/2017     Priority: Medium     PTSD (post-traumatic stress disorder) 01/17/2017     Priority: Medium     Patellofemoral instability 10/20/2016     Priority: Medium     Fibromyalgia 08/04/2016     Priority: Medium     Rheumatoid arthritis of multiple sites without rheumatoid factor (H) 08/04/2016     Priority: Medium     Raynaud's disease without gangrene 08/04/2016     Priority: Medium     Chronic abdominal pain 08/04/2016     Priority: Medium     Palpitations 01/12/2016     Priority: Medium     On colchicine therapy 10/30/2015     Priority: Medium     Spell of shaking 05/06/2015     Priority: Medium     Migraine 02/04/2015     Priority: Medium     Problem list name updated by automated process. Provider to review       Behcet's disease (H) 12/10/2014     Priority: Medium     Headaches due to old head injury 11/12/2013      Priority: Medium     Milk protein intolerance 10/11/2013     Priority: Medium     Intestinal malabsorption 10/11/2013     Priority: Medium     Concussion 02/13/2013     Priority: Medium     Jan 2013, with prolonged recovery- followed by sports med         Knee pain 01/03/2013     Priority: Medium     Generalized anxiety disorder 06/25/2009     Priority: Medium     Overview:   Created by Conversion       Anxiety state 06/25/2009     Priority: Medium     Overview:   Created by Conversion    Replacement Utility updated for latest IMO load       Tics - Tourette syndrome 05/18/2009     Priority: Medium     Followed by psychotherapy. Symptoms well managed. Originally diagnosed at [facility]. (Dr. Simpson)           IBS (irritable bowel syndrome) 05/18/2009     Priority: Medium     Allergic rhinitis 05/18/2009     Priority: Medium     GERD (gastroesophageal reflux disease) 01/10/2008     Priority: Medium     Gastroparesis 1994     Priority: Medium       PERTINENT PAST MEDICAL HISTORY:  Past Medical History:   Diagnosis Date     Anemia      Anxiety      Arthritis      Behcet's disease (H)      Cervical adenitis May 2010     Chronic abdominal pain      Constipation, chronic 1994     Fibromyalgia      Gastro-oesophageal reflux disease      Gastroparesis      Irregular heart beat     tachycardia, has had workup     Migraines      Neuromuscular disorder (H)     fibramyalgia     Palpitations      Seizure (H)      Seizures (H)     unknown etiology     Syncope      Tourette's        PREVIOUS SURGERIES:  Past Surgical History:   Procedure Laterality Date     ARTHROSCOPY ANKLE, REPAIR LIGAMENT Left 1/2/2019    Procedure: Ankle arthroscopy and sinus tarsi evacuation, ligament repair, left lower extremity;  Surgeon: Andres Johnson DPM;  Location: [facility] OR     ARTHROSCOPY KNEE WITH PATELLAR REALIGNMENT  7/25/2013    Procedure: ARTHROSCOPY KNEE WITH PATELLAR REALIGNMENT;  Left Knee Arthroscopy, Medial Patellofemoral  Ligament Reconstruction with Allograft  ;  Surgeon: Jennifer Acevedo MD;  Location: [facility] OR     COLONOSCOPY  2015     DENTAL SURGERY  1996    Teeth removal     ENDOSCOPY UPPER, COLONOSCOPY, COMBINED  2005     HC ESOPH/GAS REFLUX TEST W NASAL IMPED >1 HR N/A 2/15/2017    Procedure: ESOPHAGEAL IMPEDENCE FUNCTION TEST WITH 24 HOUR PH GREATER THAN 1 HOUR;  Surgeon: Timothy Matta MD;  Location: [facility] GI     IR PICC PLACEMENT > 5 YRS OF AGE  3/13/2019     IRRIGATION AND DEBRIDEMENT FOOT, COMBINED Left 3/12/2019    Procedure: COMBINED IRRIGATION AND DEBRIDEMENT LEFT ANKLE;  Surgeon: Micha Glover MD;  Location: [facility] OR       PREVIOUS ENDOSCOPY:  cscope 1/3/2018 (abd pain): normal, good prep with required extensive water lavage   EGD 1/3/2018 (abd pain): normal   esophageal manometry/impedance 2/2017: normal    ALLERGIES:  Allergies   Allergen Reactions     Amoxil [Penicillins] Rash     Dad unsure of reaction.     Bee Venom Anaphylaxis     Contrast Dye Rash     Contrast Media Ready-Box [facility] 04[place]09[place]2014.; Contrast Media Ready-[facility].  NOTE: this is a contrast media oral with iodine. Premedicate with methylpred standard for IV contrast, request barium contrast for oral contrast.     Orange Fruit [Citrus] Anaphylaxis     Pineapple Anaphylaxis, Difficulty breathing and Rash     Reglan [Metoclopramide] Other (See Comments)     IV dose only, in ER, rapid heart rate.     Ace Inhibitors      Difficulty in breathing and GI upset     Amitiza [Lubiprostone] Nausea and Vomiting     Amoxicillin-Pot Clavulanate      Midazolam Unknown     Other reaction(s): Unknown  parent states that when pt takes this medication, she wakes up being very violent .  parent states that when pt takes this medication, she wakes up being very violent .     No Clinical Screening - See Comments      Bleech/ chest tightness, itchy throat, swollen tongue, hives     Tizanidine Other (See Comments)     Confusion, back pain,  photophobia, abdominal pain, shaking, anxious       Versed      Coming out of pelvic exam at age of 5, was kicking and screaming when coming out of the versed.     Adhesive Tape Rash     Azithromycin Hives and Rash     Cephalexin Itching and Rash     Itchy mouth  Itchy mouth     Keflex [Cephalexin-Fd&C Yellow #6] Hives       PERTINENT MEDICATIONS:    Current Outpatient Medications:      albuterol (PROAIR HFA/PROVENTIL HFA/VENTOLIN HFA) 108 (90 BASE) MCG/ACT Inhaler, Inhale 2 puffs into the lungs every 6 hours as needed for shortness of breath / dyspnea or wheezing, Disp: 1 Inhaler, Rfl: 1     amitriptyline (ELAVIL) 25 MG tablet, Take 1 tablet (25 mg) by mouth At Bedtime, Disp: 30 tablet, Rfl: 1     artificial tears OINT ophthalmic ointment, 0.5 inch strip each eye at night, Disp: 1 Tube, Rfl: 11     aspirin (ASPIRIN CHILDRENS) 81 MG chewable tablet, Take 81 mg by mouth as needed , Disp: , Rfl:      benzocaine (TOPICALE XTRA) 20 % GEL, Apply as needed locally to mouth or nasal ulcers for pain; 4 times daily as needed, Disp: 30 g, Rfl: 1     betamethasone valerate (VALISONE) 0.1 % cream, Apply topically 2 times daily, Disp: , Rfl:      bisacodyl (DULCOLAX) 5 MG EC tablet, Take 2 tablets (10 mg) by mouth daily as needed for constipation, Disp: 30 tablet, Rfl: 0     citalopram (CELEXA) 10 MG tablet, Take 1 tablet (10 mg) by mouth daily, Disp: 30 tablet, Rfl: 1     clobetasol (TEMOVATE) 0.05 % cream, Apply topically 2 times daily, Disp: 60 g, Rfl: 0     colchicine (COLCYRS) 0.6 MG tablet, Take 0.5 tablets (0.3 mg) by mouth daily, Disp: , Rfl:      cyproheptadine (PERIACTIN) 4 MG tablet, Take 1 tablet (4 mg) by mouth At Bedtime For nightmares, Disp: 30 tablet, Rfl: 2     dicyclomine (BENTYL) 20 MG tablet, Take 1 tablet (20 mg) by mouth 4 times daily as needed, Disp: 120 tablet, Rfl: 3     diphenhydrAMINE (BENADRYL) 25 MG tablet, Take 1 tablet (25 mg) by mouth 3 times daily as needed (itching), Disp: 40 tablet, Rfl: 1      EPINEPHrine (EPIPEN 2-EAMON) 0.3 MG/0.3ML injection, Inject 0.3 mLs (0.3 mg) into the muscle as needed for anaphylaxis, Disp: 0.6 mL, Rfl: 3     gabapentin (NEURONTIN) 100 MG capsule, Take 1 capsule (100 mg) by mouth 3 times daily as needed (anxiety), Disp: 90 capsule, Rfl: 1     guanFACINE (TENEX) 1 MG tablet, Take 3 tablets (3 mg) by mouth twice daily in the morning and evening., Disp: 180 tablet, Rfl: 3     hydrocortisone 1 % CREA cream, Place rectally 2 times daily as needed for itching, Disp: 28.35 g, Rfl: 1     hyoscyamine (ANASPAZ/LEVSIN) 0.125 MG tablet, Take 1-2 tablets (125-250 mcg) by mouth every 4 hours as needed for cramping, Disp: 40 tablet, Rfl: 1     hypromellose (GENTEAL) 0.3 % SOLN, 1 drop every hour as needed for dry eyes , Disp: , Rfl:      lactulose 20 GM/30ML SOLN, Take 30 mLs by mouth 3 times daily as needed (for constipation), Disp: 300 mL, Rfl: 3     lidocaine (LMX4) 4 % external cream, Apply topically once as needed for mild pain, Disp: , Rfl:      linaclotide (LINZESS) 145 MCG capsule, Take 1 capsule (145 mcg) by mouth every morning (before breakfast), Disp: 90 capsule, Rfl: 3     LORazepam (ATIVAN) 0.5 MG tablet, Take 1 tablet (0.5 mg) by mouth every 6 hours as needed for anxiety, Disp: 10 tablet, Rfl: 0     Magic Mouthwash (FV std formula) lidocaine visc 2% 2.5mL/5mL & maalox/mylanta w/ simeth 2.5mL/5mL & diphenhydrAMINE 5mg/5mL, Swish and swallow 10 mLs in mouth every 6 hours as needed for mouth sores, Disp: 1 Bottle, Rfl: 1     nitroFURantoin macrocrystal-monohydrate (MACROBID) 100 MG capsule, Take 1 capsule (100 mg) by mouth At Bedtime, Disp: 90 capsule, Rfl: 1     norgestimate-ethinyl estradiol (ORTHO-CYCLEN/SPRINTEC) 0.25-35 MG-MCG tablet, Take 1 tablet by mouth daily, Disp: 84 tablet, Rfl: 4     omeprazole (PRILOSEC) 40 MG capsule, Take 1 capsule (40 mg) by mouth daily, Disp: 90 capsule, Rfl: 3     OnabotulinumtoxinA (BOTOX IJ), Inject 175 Units into the muscle Lot: [id number] Exp:  01/2021, Disp: , Rfl:      ondansetron (ZOFRAN-ODT) 8 MG ODT tab, Take 1 tablet (8 mg) by mouth every 8 hours as needed for nausea, Disp: 90 tablet, Rfl: 1     order for DME, Roll-A-Bout Walker. Patient can use for 3 months, Disp: 1 Units, Rfl: 0     order for DME, Equipment being ordered: size 8 1/2 walking boot tall, Disp: 1 Device, Rfl: 0     order for DME, Equipment being ordered: RollAbout knee scooter, Disp: 1 Device, Rfl: 0     order for DME, Equipment being ordered: adult pair of crutches, Disp: 1 Device, Rfl: 0     polyethylene glycol (MIRALAX/GLYCOLAX) powder, Take 1 capful by mouth 3 times daily, Disp: , Rfl:      sennosides (SENOKOT) 8.6 MG tablet, Take 3 tablets by mouth 2 times daily, Disp: 240 tablet, Rfl: 3     sodium chloride 0.9% SOLN BOLUS, Inject 1,000 mLs into the vein daily as needed (IV abx and flushes), Disp: 1000 mL, Rfl: 11     SPRINTEC 28 0.25-35 MG-MCG tablet, Take one tablet by mouth daily. Take continuously., Disp: 84 tablet, Rfl: 1     sucralfate (CARAFATE) 1 GM/10ML suspension, Take 10 mLs (1 g) by mouth 4 times daily, Disp: 1200 mL, Rfl: 2    Current Facility-Administered Medications:      heparin lock flush 10 UNIT/ML injection 5 mL, 5 mL, Intracatheter, Once, Erica Joseph MD    SOCIAL HISTORY:  Social History     Socioeconomic History     Marital status: Single     Spouse name: Not on file     Number of children: Not on file     Years of education: Not on file     Highest education level: Not on file   Occupational History     Not on file   Social Needs     Financial resource strain: Not on file     Food insecurity:     Worry: Not on file     Inability: Not on file     Transportation needs:     Medical: Not on file     Non-medical: Not on file   Tobacco Use     Smoking status: Never Smoker     Smokeless tobacco: Never Used   Substance and Sexual Activity     Alcohol use: Yes     Alcohol/week: 0.6 oz     Types: 1 Standard drinks or equivalent per week     Comment: rarely      "Drug use: No     Types: Marijuana     Comment: occassional marijuana only, denies others     Sexual activity: Not Currently     Partners: Male     Birth control/protection: Pill   Lifestyle     Physical activity:     Days per week: Not on file     Minutes per session: Not on file     Stress: Not on file   Relationships     Social connections:     Talks on phone: Not on file     Gets together: Not on file     Attends Scientologist service: Not on file     Active member of club or organization: Not on file     Attends meetings of clubs or organizations: Not on file     Relationship status: Not on file     Intimate partner violence:     Fear of current or ex partner: Not on file     Emotionally abused: Not on file     Physically abused: Not on file     Forced sexual activity: Not on file   Other Topics Concern     Parent/sibling w/ CABG, MI or angioplasty before 65F 55M? Not Asked   Social History Narrative    Boyfriend of 5 years, living with \"in laws\" Oct 2017 and looking forward to their own apartment.    no alcohol use or drug use, not right now     FAMILY HISTORY:  Family History   Problem Relation Age of Onset     Depression Mother      Neurologic Disorder Mother         Migraines, take imitrex injection.  Also in maternal grandmother.       Alcohol/Drug Father      Hypertension Father      Depression Father      Osteoarthritis Father      Cardiovascular Maternal Grandmother      Depression Maternal Grandmother      Hypertension Maternal Grandmother      Alzheimer Disease Maternal Grandmother      Cardiovascular Maternal Grandfather      Hypertension Maternal Grandfather      Depression Maternal Grandfather      Alcohol/Drug Maternal Grandfather      Cardiovascular Paternal Grandmother      Hypertension Paternal Grandmother      Cardiovascular Paternal Grandfather      Hypertension Paternal Grandfather      Glaucoma No family hx of      Macular Degeneration No family hx of        Past/family/social history reviewed " and no changes    PHYSICAL EXAMINATION:  Constitutional: aaox3, cooperative, pleasant, not dyspneic/diaphoretic, no acute distress  Vitals reviewed: /81   Pulse 98   Temp 97.9  F (36.6  C) (Oral)   SpO2 98%   Wt:   Wt Readings from Last 2 Encounters:   04/26/19 69.9 kg (154 lb)   04/16/19 70.8 kg (156 lb 1.6 oz)      Eyes: Sclera anicteric/injected  Ears/nose/mouth/throat: Normal oropharynx without ulcers or exudate, mucus membranes moist, hearing intact  Neck: supple, thyroid normal size  CV: No edema  Respiratory: Unlabored breathing  Lymph: No axillary, submandibular, supraclavicular or inguinal lymphadenopathy  Abd: scar at umbilicus. Tattoo in left lower quadrant. Soft, nondistended, +bs, no hepatosplenomegaly, mild TTP in epigastric area and LUQ, no peritoneal signs  Skin: warm, perfused, no jaundice  Psych: Normal affect  MSK: ankle in protective boot     PERTINENT STUDIES:  CT 11/3/2017: mobile cecum in LUQ (was in RUQ on 4/2017)  CT 1/8/2016: normal   UGIS 1/15: normal, delayed colon transit, significant stool burden     Labs 1/8 showed Hgb 10.9, MCV 88, RDW 13.9  12/2017: crp 12, esr 13   CMP normal     Documentation Only on 01/03/2018   Component Date Value Ref Range Status     Copath Report 01/03/2018    Final                    Value:Patient Name: LUCINA BAH  MR#: [id number]  Specimen #:   Collected: 1/3/2018  Received: 1/4/2018  Reported: 1/8/2018 10:28  Ordering Phy(s): HELEN IVAN  Additional Phy(s): [facility]: [facility]    For improved result formatting, select 'View Enhanced Report Format' under   Linked Documents section.    SPECIMEN(S):  Esophageal nodule    FINAL DIAGNOSIS:  ESOPHAGEAL NODULE, BIOPSY:  - Fragments of squamous and columnar mucosa consistent with the   squamocolumnar junction. - The squamous mucosa  is unremarkable with no evidence of reflux or other abnormality  - The columnar mucosa is gastric Cardia with mild chronic  "inflammation  - No evidence of intestinal metaplasia, dysplasia or malignancy    COMMENT:  Multiple tissue levels were cut on this case and no definitive cause for a   nodule was identified  microscopically.    I have personally reviewed all specimens and/or slides, including the   listed special stains, and used them  with my medical ju                          dgement to determine or confirm the final diagnosis.    Electronically signed out by:    Kasi Orosco M.D., [facility]    CLINICAL HISTORY:  22-year-old female with abdominal pain failing to respond to treatment.  GROSS:  The specimen is received in formalin with proper patient identification,   labeled \"esophagus nodule\".  The  specimen consists of a 0.2 cm in greatest dimension white soft tissue   fragment.  The specimen is wrapped and  entirely submitted in cassette A1. (Dictated by: Tea White [facility]   1/4/2018 10:02 AM)    MICROSCOPIC:  Microscopic examination was performed.    CPT Codes:  A: 77803-TM4    TESTING LAB LOCATION:  [facility], [address]   [id number] [phone]    COLLECTION SITE:  Client: [facility]  Location: [facility] (B)           3/27/19  There is a large colonic stool burden. This causes distention of the  majority of the colon, with the exception of the distal sigmoid and  rectum, as well as dilatation of the distal ileum which contains  fecalized material. Findings concerning for sigmoid stricture and low  grade obstruction.       3/30/19   IMPRESSION:   Significant colonic stool burden has substantially improved from prior  examination, with resolution of previously seen fecalized material in  the distal ileum. No findings to suggest bowel obstruction.    Again, thank you for allowing me to participate in the care of your patient.      Sincerely,    Sandhya Encarnacion PA-C      "

## 2019-05-01 NOTE — NURSING NOTE
Chief Complaint   Patient presents with     RECHECK     Return, post hospital follow up       Vitals:    05/01/19 0959   BP: 109/81   Pulse: 98   Temp: 97.9  F (36.6  C)   TempSrc: Oral   SpO2: 98%       There is no height or weight on file to calculate BMI.    Ashley Rivera, CMA

## 2019-05-01 NOTE — PROGRESS NOTES
BOTULINUM TOXIN PROCEDURE - HEADACHE - NOTE    Chief Complaint   Patient presents with     Botox     [facility] RETURN      /81   Pulse 98   Temp 97.9  F (36.6  C) (Oral)   Resp 16   Wt 70.3 kg (155 lb)   SpO2 98%   BMI 27.46 kg/m       Current Outpatient Medications:      albuterol (PROAIR HFA/PROVENTIL HFA/VENTOLIN HFA) 108 (90 BASE) MCG/ACT Inhaler, Inhale 2 puffs into the lungs every 6 hours as needed for shortness of breath / dyspnea or wheezing, Disp: 1 Inhaler, Rfl: 1     amitriptyline (ELAVIL) 25 MG tablet, Take 1 tablet (25 mg) by mouth At Bedtime, Disp: 30 tablet, Rfl: 1     artificial tears OINT ophthalmic ointment, 0.5 inch strip each eye at night, Disp: 1 Tube, Rfl: 11     aspirin (ASPIRIN CHILDRENS) 81 MG chewable tablet, Take 81 mg by mouth as needed , Disp: , Rfl:      benzocaine (TOPICALE XTRA) 20 % GEL, Apply as needed locally to mouth or nasal ulcers for pain; 4 times daily as needed, Disp: 30 g, Rfl: 1     betamethasone valerate (VALISONE) 0.1 % cream, Apply topically 2 times daily, Disp: , Rfl:      bisacodyl (DULCOLAX) 5 MG EC tablet, Take 2 tablets (10 mg) by mouth daily as needed for constipation, Disp: 30 tablet, Rfl: 0     citalopram (CELEXA) 10 MG tablet, Take 1 tablet (10 mg) by mouth daily, Disp: 30 tablet, Rfl: 1     clobetasol (TEMOVATE) 0.05 % cream, Apply topically 2 times daily, Disp: 60 g, Rfl: 0     colchicine (COLCYRS) 0.6 MG tablet, Take 0.5 tablets (0.3 mg) by mouth daily, Disp: , Rfl:      cyproheptadine (PERIACTIN) 4 MG tablet, Take 1 tablet (4 mg) by mouth At Bedtime For nightmares, Disp: 30 tablet, Rfl: 2     dicyclomine (BENTYL) 20 MG tablet, Take 1 tablet (20 mg) by mouth 4 times daily as needed, Disp: 120 tablet, Rfl: 3     diphenhydrAMINE (BENADRYL) 25 MG tablet, Take 1 tablet (25 mg) by mouth 3 times daily as needed (itching), Disp: 40 tablet, Rfl: 1     EPINEPHrine (EPIPEN 2-EAMON) 0.3 MG/0.3ML injection, Inject 0.3 mLs (0.3 mg) into the muscle as needed for  anaphylaxis, Disp: 0.6 mL, Rfl: 3     gabapentin (NEURONTIN) 100 MG capsule, Take 1 capsule (100 mg) by mouth 3 times daily as needed (anxiety), Disp: 90 capsule, Rfl: 1     guanFACINE (TENEX) 1 MG tablet, Take 3 tablets (3 mg) by mouth twice daily in the morning and evening., Disp: 180 tablet, Rfl: 3     hydrocortisone 1 % CREA cream, Place rectally 2 times daily as needed for itching, Disp: 28.35 g, Rfl: 1     hyoscyamine (ANASPAZ/LEVSIN) 0.125 MG tablet, Take 1-2 tablets (125-250 mcg) by mouth every 4 hours as needed for cramping, Disp: 40 tablet, Rfl: 1     hypromellose (GENTEAL) 0.3 % SOLN, 1 drop every hour as needed for dry eyes , Disp: , Rfl:      lactulose 20 GM/30ML SOLN, Take 30 mLs by mouth 3 times daily as needed (for constipation), Disp: 300 mL, Rfl: 3     lidocaine (LMX4) 4 % external cream, Apply topically once as needed for mild pain, Disp: , Rfl:      linaclotide (LINZESS) 290 MCG capsule, Take 1 capsule (290 mcg) by mouth every morning (before breakfast), Disp: 90 capsule, Rfl: 3     LORazepam (ATIVAN) 0.5 MG tablet, Take 1 tablet (0.5 mg) by mouth every 6 hours as needed for anxiety, Disp: 10 tablet, Rfl: 0     Magic Mouthwash (FV std formula) lidocaine visc 2% 2.5mL/5mL & maalox/mylanta w/ simeth 2.5mL/5mL & diphenhydrAMINE 5mg/5mL, Swish and swallow 10 mLs in mouth every 6 hours as needed for mouth sores, Disp: 1 Bottle, Rfl: 1     nitroFURantoin macrocrystal-monohydrate (MACROBID) 100 MG capsule, Take 1 capsule (100 mg) by mouth At Bedtime, Disp: 90 capsule, Rfl: 1     norgestimate-ethinyl estradiol (ORTHO-CYCLEN/SPRINTEC) 0.25-35 MG-MCG tablet, Take 1 tablet by mouth daily, Disp: 84 tablet, Rfl: 4     omeprazole (PRILOSEC) 40 MG capsule, Take 1 capsule (40 mg) by mouth daily, Disp: 90 capsule, Rfl: 3     ondansetron (ZOFRAN-ODT) 8 MG ODT tab, Take 1 tablet (8 mg) by mouth every 8 hours as needed for nausea, Disp: 90 tablet, Rfl: 1     order for DME, Roll-A-Bout Walker. Patient can use for 3  months, Disp: 1 Units, Rfl: 0     order for DME, Equipment being ordered: size 8 1/2 walking boot tall, Disp: 1 Device, Rfl: 0     order for DME, Equipment being ordered: RollAbout knee scooter, Disp: 1 Device, Rfl: 0     order for DME, Equipment being ordered: adult pair of crutches, Disp: 1 Device, Rfl: 0     polyethylene glycol (MIRALAX/GLYCOLAX) powder, Take 1 capful by mouth 3 times daily, Disp: , Rfl:      sennosides (SENOKOT) 8.6 MG tablet, Take 3 tablets by mouth 2 times daily, Disp: 240 tablet, Rfl: 3     sodium chloride 0.9% SOLN BOLUS, Inject 1,000 mLs into the vein daily as needed (IV abx and flushes), Disp: 1000 mL, Rfl: 11     SPRINTEC 28 0.25-35 MG-MCG tablet, Take one tablet by mouth daily. Take continuously., Disp: 84 tablet, Rfl: 1     sucralfate (CARAFATE) 1 GM/10ML suspension, Take 10 mLs (1 g) by mouth 4 times daily, Disp: 1200 mL, Rfl: 2    Current Facility-Administered Medications:      heparin lock flush 10 UNIT/ML injection 5 mL, 5 mL, Intracatheter, Once, Erica Joseph MD     Allergies   Allergen Reactions     Amoxil [Penicillins] Rash     Dad unsure of reaction.     Bee Venom Anaphylaxis     Contrast Dye Rash     Contrast Media Ready-Box [facility], 04/09/2014.; Contrast Media Ready-[employer] [facility], 04/09/2014.  NOTE: this is a contrast media oral with iodine. Premedicate with methylpred standard for IV contrast, request barium contrast for oral contrast.     Orange Fruit [Citrus] Anaphylaxis     Pineapple Anaphylaxis, Difficulty breathing and Rash     Reglan [Metoclopramide] Other (See Comments)     IV dose only, in ER, rapid heart rate.     Ace Inhibitors      Difficulty in breathing and GI upset     Amitiza [Lubiprostone] Nausea and Vomiting     Amoxicillin-Pot Clavulanate      Midazolam Unknown     Other reaction(s): Unknown  parent states that when pt takes this medication, she wakes up being very violent .  parent states that when pt takes this medication, she wakes up being very violent .      No Clinical Screening - See Comments      Bleech/ chest tightness, itchy throat, swollen tongue, hives     Tizanidine Other (See Comments)     Confusion, back pain, photophobia, abdominal pain, shaking, anxious       Versed      Coming out of pelvic exam at age of 5, was kicking and screaming when coming out of the versed.     Adhesive Tape Rash     Azithromycin Hives and Rash     Cephalexin Itching and Rash     Itchy mouth  Itchy mouth     Keflex [Cephalexin-Fd&C Yellow #6] Hives        PHYSICAL EXAM:    Denies headache at today's visit.  Is in a walking boot on the left and has a PICC line in place.    HPI:    Patient reports the following new medical problems since last visit: Patient was admitted to hospital on 03/09/2019 for a left ankle abscess.  Completing course of antibiotics via PICC line today.   She was also seen in the ER on 03/26/2019 for an occluded PICC line and again on 03/27/2019 for abdominal pain and acute kidney injury secondary to the IV antibiotics she's been receiving.  She was also seen in the ER for irritable bowel syndrome on 03/30/2019 with a subsequent hospital admission.  On 04/26/2019 she was also seen in the ER for elevated d-dimer to rule out a blood clot.  No clot was found.      We reviewed the recommended safety guidelines for  Botox from any vaccine injection, such as the seasonal flu vaccine, by a minimum of 10-14 days with Samara Oropeza. She acknowledged understanding.    RESPONSE TO PREVIOUS TREATMENT:  Change in headache pattern following last series of injections with 175 units on 02/05/2019.     No problems reported    1.  Headache frequency during this injection cycle: Patient reports increased headache frequency during the past injection cycle that she attributes to the many medical issues she has experienced.  She reports approximately 8-12 headache days per month during the past injection cycle.  She generally experiences This is compared to her  baseline headache frequency of 20 headache days per month.     2.  Headache duration during this injection cycle:  Headache duration ranged from hours to 4 days. Patient reports 3 episodes of multiple day headaches during this injection cycle.    3.  Headache intensity during this injection cycle:    A.  3/10  =  Typical pain level.  B.  8/10  =  Worst pain level.  C.  0/10  =  Lowest pain level.    4.  Change in headache medication usage during this injection cycle:  Utilized migraine medications while hospitalized.    5.  ER Visits During This Injection Cycle Due to Migraine Headache:  None.    6.  Functional Performance:  Change in ADL's, social interaction, days lost from work, etc. Patient reports that she needed to cancel a number of social obligations secondary to her multiple medical issues during this injection cycle. after the procedure which is typical for her.      BOTULINUM NEUROTOXIN INJECTION PROCEDURES:      VERIFICATION OF PATIENT IDENTIFICATION AND PROCEDURE     Initials   Patient Name tlb   Patient  tlb   Procedure Verified by: tita     Prior to the start of the procedure and with procedural staff participation, I verbally confirmed the patient s identity using two indicators, relevant allergies, that the procedure was appropriate and matched the consent or emergent situation, and that the correct equipment/implants were available. Immediately prior to starting the procedure I conducted the Time Out with the procedural staff and re-confirmed the patient s name, procedure, and site/side. (The Joint Commission universal protocol was followed.)  Yes    Sedation (Moderate or Deep): None    Above assessments performed by:    Marcelle Richardson, PT - Care Coordiantor    Alejandrina Hernandez MD      INDICATIONS FOR PROCEDURES:  Samara Oropeza is a 23-year-old patient with a complex medical history that includes Tourette's syndrome, spasmodic torticollis, chronic neck pain, and chronic migraine headaches  associated with cervicogenic components.     Her baseline symptoms have been recalcitrant to oral medications and conservative therapy.  She is here today for reinjection with Botox.    GOAL OF PROCEDURE:  The goal of this procedure is to increase active range of motion, improve volitional motor control, decrease pain  and enhance functional independence.    TOTAL DOSE ADMINISTERED:  Dose Administered:  175 units  Botox (Botulinum Toxin Type A)       2:1 Dilution   Diluent Volume: 3.5 ml Sensorcaine  Diluent Used:  0.25% Sensorcaine (Batch: [id number], Exp: 11/2021, NDC 55150-167-10)   Lot: /C3  with Expiration Date: 09/2021  NDC #: Botox 100u ([address]-39)    Was there drug waste? Yes  Amount of drug waste (mL): 25 units Botox.  Reason for waste:  Single use vial  Multi-dose vial: No    Alejandrina Hernandez MD  May 1, 2019    Medication guide was offered to patient and was declined.    CONSENT:  The risks, benefits, and treatment options were discussed with Samara Oropeza and she agreed to proceed.    Written consent was obtained by TLB.     EQUIPMENT USED:  Needle-37mm stimulating/recording  Needle-30 gauge  EMG/NCS Machine    SKIN PREPARATION:  Skin preparation was performed using an alcohol wipe.    GUIDANCE DESCRIPTION:  Electro-myographic guidance was necessary throughout the procedure to accurately identify all areas of spastic muscles while avoiding injection of non-spastic muscles, neighboring nerves and nearby vascular structures.     AREA/MUSCLE INJECTED:  175 UNITS BOTOX = TOTAL DOSE     1 & 2. SHOULDER GIRDLE & NECK MUSCLES: 20 units Botox = Total Dose, 2:1 Dilution   Right Splenius - 5 units of Botox at 1 site/s.   Left Splenius - 5 units of Botox at 1 site/s.      Right Levator Scapulae - 5 units of Botox at 1 site/s (shoulder muscles).   Left Levator Scapulae - 5 units of Botox at 1 site/s (shoulder muscles).     3. HEAD & SCALP MUSCLES: 155 units Botox = Total Dose, 2:1 Dilution  Right  Occipitalis - 10 units of Botox at 3 site/s.   Left Occipitalis - 10 units of Botox at 3 site/s.     Right Frontalis - 15 units of Botox at 4 site/s.  Left Frontalis - 15 units of Botox at 4 site/s.     Right Temporalis - 45 units of Botox at 8 site/s.  Left Temporalis - 45 units of Botox at 8 site/s.     Right  - 5 units of Botox at 1 site/s.              Left  - 5 units of Botox at 1 site/s.      Procerus - 5 units of Botox at 1 site/s.      RESPONSE TO PROCEDURE:  Samara Oropeza tolerated the procedure well and there were no immediate complications.  She was allowed to recover for an appropriate period of time and was discharged home in stable condition.    FOLLOW UP:  Samara Oropeza was asked to follow up by phone in 7-14 days with Marcelle Richardson PT, Care Coordinator or Vidya Dickinson RN, Care Coordinator, to report her response to this series of injections.  Based on the patient's previous response to this therapy, Samara Oropeza was rescheduled for the next series of injections in 12 weeks.    PLAN (Medication Changes, Therapy Orders, Work or Disability Issues, etc.): Patient will continue to monitor response to today's injections.

## 2019-05-01 NOTE — PROGRESS NOTES
GI CLINIC VISIT    CC/REFERRING MD:  Referred Self  REASON FOR CONSULTATION: follow-up    ASSESSMENT/PLAN:  Samara is a 23 yo woman with chronic constipation, chronic left upper quadrant pain and likely mobile cecum presenting for evaluation.  I suspect that her constipation is a root of many of her complaints, including abdominal pain, nausea and vomiting.  She had a normal EGD, cscope. Except for a mobile cecum, CT c/a/p was normal. She also reports having a capsule study that was normal.     1. Constipation  Most recent upper colonoscopy 1/3/2018 with normal colon and terminal ileum without evidence of stricture at this time.  Possible sigmoid stricture seen on CT scan 3/27/1 resolved on follow-up CT scan 3/30/2019. Patient has long-standing history of constipation history of constipation may be worsened in the setting of narcotic use after recent ankle surgery.  Also had findings of abnormal digital rectal exam at last years visit and has not yet followed with pelvic floor center.  Patient has recently been started again on Linzess 145 MCG daily with slight improvement in her symptoms however she continues to be on multiple bowel medications.  We discussed that it may be helpful to increase dose of Linzess and work to decrease doses of other bowel medications.  We discussed that she can continue Dulcolax and MiraLAX as per previous documentation, Linzess to 90 did not improve symptoms.  Would try to decrease lactulose and senna at this time as these medications may cause abdominal cramping.  May need to decrease MiraLAX and Dulcolax if stools become too loose.  If patient becomes constipated and goes 2 to 3 days without a bowel movement, we discussed that she can add back in these medications.  Would also recommend nonurgent referral to the pelvic floor center for evaluation of pelvic floor dysfunction as the patient has failed multiple laxatives.  Patient is on several constipating medications including  Zofran, Bentyl, Levsin, and opioid pain medications which are all extremely constipating.  We discussed that if possible she should avoid these medications. Will also obtain prior records to evaluate for history of Hirschsprung's as an infant. Future considerations include treating with Trulance, and potentially colectomy if refractory constipation.   -- start Linzess 290 mcg daily   -- ok to continue Miralax- if stools become too loose, decrease to once a day  -- ok to continue dulocolax   -- try stopping lactulose and senna   -- if you go 2-3 days without a bowel movement    -increase Miralax   -then can add back senna or lactulose   -- pelvic floor center   -- continue to avoid constipating medications if possible (especially zofran, bentyl, levsin if possible)     2. History of gastroparesis   Gastric emptying exam 12/4/2015 notable for 18% at 240 minutes significant for gastroparesis- though unclear if the patient was on narcotic pain medications/moving her bowels at this time. Patient notes some postprandial abdominal pain and bloating, and daily nausea for which she is taking Zofran up to 4 times a day.  We discussed that patient should try to limit Zofran use if possible as this can exacerbate constipation and worsen gastroparesis.  Narcotic medications may also slow gastric emptying.  We discussed that she should try dietary measures including avoiding foods high in fat and fiber, and eating small frequent meals throughout the day.  She can discuss this further with one of our dietitian.    -- do not take zofran more than 3 times a day  -- meet with our dietitian (oJse) to discuss slow gastric emptying diet and gas producing foods     3. Bright red blood per rectum, anemia   During recent admission, patient described possible hematochezia/red blood with bowel movements. Of note, patient has described this at GI visits in the past (last year after recent normal colonoscopy and upper endoscopy in  1/2018). Source of ?melena/hematochezia may be due to hemhoidal bleeding vs. trauma from attempts to manually disimpact in the setting of severe constipation. May also be due to ulcerations d/t Bechet's syndrome (was not seen on EGD + colon). Of note, patient also had prolonged vaginal bleed which was thought to be related to Bechet's - was previously well controlled with colchicine. Would recommend serial monitoring of hgb through primary care provider. If persistent blood per rectum despite regulation of constipation as outlined above, could consider colorectal surgery evaluation for anoscopy. If drop in hemoglobin, blood pressure, or evidence of hemorrhage, could consider colonoscopy though patient has had recent normal colonoscopy (2018).   -- continue to monitor Hgb through PCP    Return to GI Clinic in 1 month to review your progress.     Sandhya Encarnacion PA-C  Division of Gastroenterology, Hepatology & Nutrition  [facility]  Samara Oropeza is a 23 year old woman with a complex medical history of Bechet's syndrome, s/p left ankle fusion (1/2/19) c/b infection, tourette's seizures, irregular heartbeat, gastroparesis, and GERD presenting for follow-up.     She has previously been seen by Kacie Almeida NP, and Dr. Cuenca and this is my first encounter with the patient. She was most recently seen by Dr. Cuenca 1/17/18. At the time, the patient endorsed constant nausea with specific triggers with vomiting 1-2 times per week (in the setting of eating). Also had LUQ abdominal pain. Was found to have a mobile cecum on workup and has met with surgeons in the past regarding this. Also reported to having a bowel movement every 6-14 days and at the time was taking Linzess 290 mcg with minimal improvement- noted dark red blood in the stool. She was then switched to Amitiza 24 mcg daily (with plan to increase as needed to BID) and did not follow-up with GI clinic.     Patient underwent left ankle  "fusion 1/2/19 Samara was admitted to [facility] 3/30/2019 due to 7 day history of periumbilical abdominal pain and report of 22 days of alternating constipation and diarrhea with maroon stools/hematochezia.  Patient also tried to manually disimpact at this time without success.  CT done 3/27 large colonic stool burden with concern for sigmoid stricture, although after improvement in bowel regimen, stool burden was improved and no stricture/evidence of obstruction was noted. Source of worsening constipation (with probable over-flow diarrhea) thought to be due to increased narcotic pain medication (and likely decreased mobility)  in the setting of recent left ankle fusion.     After the patient was discharged, she met with MTM 4/3/19 who recommended to re-start Linzess 145 mcg and back off bowel regimen.     Today the patient notes on average she is having a bowel movement 2-3 times a week.  States consistency can be very hard, or watery and can alternate between the 2 of them.  Within the last week, pain has improved significantly.  Is currently taking Linzess 145 MCG, Dulcolax at night, lactulose 30 mL 1-3 times a day, smooth move tea before bed, and MiraLAX 2-3 times daily.  Notes that despite this regimen, she feels fullness and discomfort in her rectum and abdomen and does not feel empty with bowel movements.  Can occasionally have blood with bowel movements.    She notes associated bloating and \"extreme pain\" immediately after eating located in her lower abdomen.  Because of this, she is eating less food than before.  Notes that this bloating has been getting worse.  Has visible distention associated with bloating.  States that certain types of meat can make this worse for instance he can encounter again.  Patient has evening 1-2 meals a day.  She is also taking Bentyl and hyoscyamine on average once each day.    Patient notes regular nausea on a daily basis.  This is a long-standing issue.  On average is taking " Zofran 8 mg once a day, however she can take this up to 4 times a day depending on level of nausea.  Notes that GERD symptoms have been pretty well controlled and has been taking omeprazole and Carafate on and off.      ROS:    No fevers or chills  No weight loss  No blurry vision, double vision or change in vision- changes wuickly   + sore throat  + lymphadenopathy  + headache, paraesthesias, or weakness in a limb  + shortness of breath or wheezing  + chest pain or pressure  + arthralgias or myalgias  No rashes or skin changes  No odynophagia or dysphagia- food gets stuck   No current BRBPR, hematochezia, melena  No dysuria, frequency or urgency  + frequent UTIs   No hot/cold intolerance or polyria  + anxiety - started seeing therapist - started citalopram and gabapentin     PROBLEM LIST  Patient Active Problem List    Diagnosis Date Noted     Acute abdominal pain 03/30/2019     Priority: Medium     Cellulitis 03/09/2019     Priority: Medium     Aphthous ulcer 11/13/2018     Priority: Medium     Overview:   Created by Conversion       Cough 11/13/2018     Priority: Medium     Overview:   Created by Conversion       Dysthymic disorder 11/13/2018     Priority: Medium     Overview:   Created by Conversion       Displacement of lumbar intervertebral disc without myelopathy 11/13/2018     Priority: Medium     Overview:   Created by Conversion       Iron deficiency associated with nonfamilial restless legs syndrome 11/13/2018     Priority: Medium     Other specified symptom associated with female genital organs 11/13/2018     Priority: Medium     Overview:   Created by Conversion       Enthesopathy of hip region 11/13/2018     Priority: Medium     Overview:   Created by Conversion       Tourette's syndrome 11/13/2018     Priority: Medium     Overview:   Created by Conversion       Somatic symptom disorder 06/01/2018     Priority: Medium     Pelvic floor weakness 04/25/2018     Priority: Medium     Spells of decreased  attentiveness 12/19/2017     Priority: Medium     Mobile cecum 11/09/2017     Priority: Medium     Cecum noted in Right lower quadrant on 4/17 CT scan, and in Left upper Quadrant on CT on 11/2017.       Vitamin D deficiency 10/11/2017     Priority: Medium     How low, unknown/not found. On D when tested at 27. Starting cholecalciferol October 2017. Needs recheck.       Convulsions, unspecified convulsion type (H) 10/03/2017     Priority: Medium     Transient alteration of awareness 10/03/2017     Priority: Medium     Chronic pain syndrome 07/27/2017     Priority: Medium     Major depressive disorder, recurrent episode, moderate (H) 06/27/2017     Priority: Medium     Cervical pain 05/02/2017     Priority: Medium     Acute left ankle pain 03/31/2017     Priority: Medium     Cervical dystonia 03/28/2017     Priority: Medium     PTSD (post-traumatic stress disorder) 01/17/2017     Priority: Medium     Patellofemoral instability 10/20/2016     Priority: Medium     Fibromyalgia 08/04/2016     Priority: Medium     Rheumatoid arthritis of multiple sites without rheumatoid factor (H) 08/04/2016     Priority: Medium     Raynaud's disease without gangrene 08/04/2016     Priority: Medium     Chronic abdominal pain 08/04/2016     Priority: Medium     Palpitations 01/12/2016     Priority: Medium     On colchicine therapy 10/30/2015     Priority: Medium     Spell of shaking 05/06/2015     Priority: Medium     Migraine 02/04/2015     Priority: Medium     Problem list name updated by automated process. Provider to review       Behcet's disease (H) 12/10/2014     Priority: Medium     Headaches due to old head injury 11/12/2013     Priority: Medium     Milk protein intolerance 10/11/2013     Priority: Medium     Intestinal malabsorption 10/11/2013     Priority: Medium     Concussion 02/13/2013     Priority: Medium     Jan 2013, with prolonged recovery- followed by sports med         Knee pain 01/03/2013     Priority: Medium      Generalized anxiety disorder 06/25/2009     Priority: Medium     Overview:   Created by Conversion       Anxiety state 06/25/2009     Priority: Medium     Overview:   Created by Conversion    Replacement Utility updated for latest IMO load       Tics - Tourette syndrome 05/18/2009     Priority: Medium     Followed by psychotherapy. Symptoms well managed. Originally diagnosed at [facility]. (Dr. Simpson)           IBS (irritable bowel syndrome) 05/18/2009     Priority: Medium     Allergic rhinitis 05/18/2009     Priority: Medium     GERD (gastroesophageal reflux disease) 01/10/2008     Priority: Medium     Gastroparesis 1994     Priority: Medium       PERTINENT PAST MEDICAL HISTORY:  Past Medical History:   Diagnosis Date     Anemia      Anxiety      Arthritis      Behcet's disease (H)      Cervical adenitis May 2010     Chronic abdominal pain      Constipation, chronic 1994     Fibromyalgia      Gastro-oesophageal reflux disease      Gastroparesis      Irregular heart beat     tachycardia, has had workup     Migraines      Neuromuscular disorder (H)     fibramyalgia     Palpitations      Seizure (H)      Seizures (H)     unknown etiology     Syncope      Tourette's        PREVIOUS SURGERIES:  Past Surgical History:   Procedure Laterality Date     ARTHROSCOPY ANKLE, REPAIR LIGAMENT Left 1/2/2019    Procedure: Ankle arthroscopy and sinus tarsi evacuation, ligament repair, left lower extremity;  Surgeon: Andres Johnson DPM;  Location:  OR     ARTHROSCOPY KNEE WITH PATELLAR REALIGNMENT  7/25/2013    Procedure: ARTHROSCOPY KNEE WITH PATELLAR REALIGNMENT;  Left Knee Arthroscopy, Medial Patellofemoral Ligament Reconstruction with Allograft  ;  Surgeon: Jennifer Acevedo MD;  Location: [facility]     COLONOSCOPY  2015     DENTAL SURGERY  1996    Teeth removal     ENDOSCOPY UPPER, COLONOSCOPY, COMBINED  2005     HC ESOPH/GAS REFLUX TEST W NASAL IMPED >1 HR N/A 2/15/2017    Procedure: ESOPHAGEAL IMPEDENCE  FUNCTION TEST WITH 24 HOUR PH GREATER THAN 1 HOUR;  Surgeon: Timothy Matta MD;  Location: [facility]     IR PICC PLACEMENT > 5 YRS OF AGE  3/13/2019     IRRIGATION AND DEBRIDEMENT FOOT, COMBINED Left 3/12/2019    Procedure: COMBINED IRRIGATION AND DEBRIDEMENT LEFT ANKLE;  Surgeon: Micha Glover MD;  Location: [facility] OR       PREVIOUS ENDOSCOPY:  cscope 1/3/2018 (abd pain): normal, good prep with required extensive water lavage   EGD 1/3/2018 (abd pain): normal   esophageal manometry/impedance 2/2017: normal    ALLERGIES:  Allergies   Allergen Reactions     Amoxil [Penicillins] Rash     Dad unsure of reaction.     Bee Venom Anaphylaxis     Contrast Dye Rash     Contrast Media Ready-Box [facility] [place] 04[place]09/2014.; Contrast Media Ready-[facility] [place] 04[place]09/2014.  NOTE: this is a contrast media oral with iodine. Premedicate with methylpred standard for IV contrast, request barium contrast for oral contrast.     Orange Fruit [Citrus] Anaphylaxis     Pineapple Anaphylaxis, Difficulty breathing and Rash     Reglan [Metoclopramide] Other (See Comments)     IV dose only, in ER, rapid heart rate.     Ace Inhibitors      Difficulty in breathing and GI upset     Amitiza [Lubiprostone] Nausea and Vomiting     Amoxicillin-Pot Clavulanate      Midazolam Unknown     Other reaction(s): Unknown  parent states that when pt takes this medication, she wakes up being very violent .  parent states that when pt takes this medication, she wakes up being very violent .     No Clinical Screening - See Comments      Bleech/ chest tightness, itchy throat, swollen tongue, hives     Tizanidine Other (See Comments)     Confusion, back pain, photophobia, abdominal pain, shaking, anxious       Versed      Coming out of pelvic exam at age of 5, was kicking and screaming when coming out of the versed.     Adhesive Tape Rash     Azithromycin Hives and Rash     Cephalexin Itching and Rash     Itchy mouth  Itchy mouth     Keflex [Cephalexin-Fd&C Yellow  #6] Hives       PERTINENT MEDICATIONS:    Current Outpatient Medications:      albuterol (PROAIR HFA/PROVENTIL HFA/VENTOLIN HFA) 108 (90 BASE) MCG/ACT Inhaler, Inhale 2 puffs into the lungs every 6 hours as needed for shortness of breath / dyspnea or wheezing, Disp: 1 Inhaler, Rfl: 1     amitriptyline (ELAVIL) 25 MG tablet, Take 1 tablet (25 mg) by mouth At Bedtime, Disp: 30 tablet, Rfl: 1     artificial tears OINT ophthalmic ointment, 0.5 inch strip each eye at night, Disp: 1 Tube, Rfl: 11     aspirin (ASPIRIN CHILDRENS) 81 MG chewable tablet, Take 81 mg by mouth as needed , Disp: , Rfl:      benzocaine (TOPICALE XTRA) 20 % GEL, Apply as needed locally to mouth or nasal ulcers for pain; 4 times daily as needed, Disp: 30 g, Rfl: 1     betamethasone valerate (VALISONE) 0.1 % cream, Apply topically 2 times daily, Disp: , Rfl:      bisacodyl (DULCOLAX) 5 MG EC tablet, Take 2 tablets (10 mg) by mouth daily as needed for constipation, Disp: 30 tablet, Rfl: 0     citalopram (CELEXA) 10 MG tablet, Take 1 tablet (10 mg) by mouth daily, Disp: 30 tablet, Rfl: 1     clobetasol (TEMOVATE) 0.05 % cream, Apply topically 2 times daily, Disp: 60 g, Rfl: 0     colchicine (COLCYRS) 0.6 MG tablet, Take 0.5 tablets (0.3 mg) by mouth daily, Disp: , Rfl:      cyproheptadine (PERIACTIN) 4 MG tablet, Take 1 tablet (4 mg) by mouth At Bedtime For nightmares, Disp: 30 tablet, Rfl: 2     dicyclomine (BENTYL) 20 MG tablet, Take 1 tablet (20 mg) by mouth 4 times daily as needed, Disp: 120 tablet, Rfl: 3     diphenhydrAMINE (BENADRYL) 25 MG tablet, Take 1 tablet (25 mg) by mouth 3 times daily as needed (itching), Disp: 40 tablet, Rfl: 1     EPINEPHrine (EPIPEN 2-EAMON) 0.3 MG/0.3ML injection, Inject 0.3 mLs (0.3 mg) into the muscle as needed for anaphylaxis, Disp: 0.6 mL, Rfl: 3     gabapentin (NEURONTIN) 100 MG capsule, Take 1 capsule (100 mg) by mouth 3 times daily as needed (anxiety), Disp: 90 capsule, Rfl: 1     guanFACINE (TENEX) 1 MG tablet,  Take 3 tablets (3 mg) by mouth twice daily in the morning and evening., Disp: 180 tablet, Rfl: 3     hydrocortisone 1 % CREA cream, Place rectally 2 times daily as needed for itching, Disp: 28.35 g, Rfl: 1     hyoscyamine (ANASPAZ/LEVSIN) 0.125 MG tablet, Take 1-2 tablets (125-250 mcg) by mouth every 4 hours as needed for cramping, Disp: 40 tablet, Rfl: 1     hypromellose (GENTEAL) 0.3 % SOLN, 1 drop every hour as needed for dry eyes , Disp: , Rfl:      lactulose 20 GM/30ML SOLN, Take 30 mLs by mouth 3 times daily as needed (for constipation), Disp: 300 mL, Rfl: 3     lidocaine (LMX4) 4 % external cream, Apply topically once as needed for mild pain, Disp: , Rfl:      linaclotide (LINZESS) 145 MCG capsule, Take 1 capsule (145 mcg) by mouth every morning (before breakfast), Disp: 90 capsule, Rfl: 3     LORazepam (ATIVAN) 0.5 MG tablet, Take 1 tablet (0.5 mg) by mouth every 6 hours as needed for anxiety, Disp: 10 tablet, Rfl: 0     Magic Mouthwash (FV std formula) lidocaine visc 2% 2.5mL/5mL & maalox/mylanta w/ simeth 2.5mL/5mL & diphenhydrAMINE 5mg/5mL, Swish and swallow 10 mLs in mouth every 6 hours as needed for mouth sores, Disp: 1 Bottle, Rfl: 1     nitroFURantoin macrocrystal-monohydrate (MACROBID) 100 MG capsule, Take 1 capsule (100 mg) by mouth At Bedtime, Disp: 90 capsule, Rfl: 1     norgestimate-ethinyl estradiol (ORTHO-CYCLEN/SPRINTEC) 0.25-35 MG-MCG tablet, Take 1 tablet by mouth daily, Disp: 84 tablet, Rfl: 4     omeprazole (PRILOSEC) 40 MG capsule, Take 1 capsule (40 mg) by mouth daily, Disp: 90 capsule, Rfl: 3     OnabotulinumtoxinA (BOTOX IJ), Inject 175 Units into the muscle Lot: [id number] Exp: 01/2021, Disp: , Rfl:      ondansetron (ZOFRAN-ODT) 8 MG ODT tab, Take 1 tablet (8 mg) by mouth every 8 hours as needed for nausea, Disp: 90 tablet, Rfl: 1     order for DME, Roll-A-Bout Walker. Patient can use for 3 months, Disp: 1 Units, Rfl: 0     order for DME, Equipment being ordered: size 8 1/2 walking boot  tall, Disp: 1 Device, Rfl: 0     order for DME, Equipment being ordered: RollAbout knee scooter, Disp: 1 Device, Rfl: 0     order for DME, Equipment being ordered: adult pair of crutches, Disp: 1 Device, Rfl: 0     polyethylene glycol (MIRALAX/GLYCOLAX) powder, Take 1 capful by mouth 3 times daily, Disp: , Rfl:      sennosides (SENOKOT) 8.6 MG tablet, Take 3 tablets by mouth 2 times daily, Disp: 240 tablet, Rfl: 3     sodium chloride 0.9% SOLN BOLUS, Inject 1,000 mLs into the vein daily as needed (IV abx and flushes), Disp: 1000 mL, Rfl: 11     SPRINTEC 28 0.25-35 MG-MCG tablet, Take one tablet by mouth daily. Take continuously., Disp: 84 tablet, Rfl: 1     sucralfate (CARAFATE) 1 GM/10ML suspension, Take 10 mLs (1 g) by mouth 4 times daily, Disp: 1200 mL, Rfl: 2    Current Facility-Administered Medications:      heparin lock flush 10 UNIT/ML injection 5 mL, 5 mL, Intracatheter, Once, Erica Joseph MD    SOCIAL HISTORY:  Social History     Socioeconomic History     Marital status: Single     Spouse name: Not on file     Number of children: Not on file     Years of education: Not on file     Highest education level: Not on file   Occupational History     Not on file   Social Needs     Financial resource strain: Not on file     Food insecurity:     Worry: Not on file     Inability: Not on file     Transportation needs:     Medical: Not on file     Non-medical: Not on file   Tobacco Use     Smoking status: Never Smoker     Smokeless tobacco: Never Used   Substance and Sexual Activity     Alcohol use: Yes     Alcohol/week: 0.6 oz     Types: 1 Standard drinks or equivalent per week     Comment: rarely     Drug use: No     Types: Marijuana     Comment: occassional marijuana only, denies others     Sexual activity: Not Currently     Partners: Male     Birth control/protection: Pill   Lifestyle     Physical activity:     Days per week: Not on file     Minutes per session: Not on file     Stress: Not on file  "  Relationships     Social connections:     Talks on phone: Not on file     Gets together: Not on file     Attends Judaism service: Not on file     Active member of club or organization: Not on file     Attends meetings of clubs or organizations: Not on file     Relationship status: Not on file     Intimate partner violence:     Fear of current or ex partner: Not on file     Emotionally abused: Not on file     Physically abused: Not on file     Forced sexual activity: Not on file   Other Topics Concern     Parent/sibling w/ CABG, MI or angioplasty before 65F 55M? Not Asked   Social History Narrative    Boyfriend of 5 years, living with \"in laws\" Oct 2017 and looking forward to their own apartment.    no alcohol use or drug use, not right now     FAMILY HISTORY:  Family History   Problem Relation Age of Onset     Depression Mother      Neurologic Disorder Mother         Migraines, take imitrex injection.  Also in maternal grandmother.       Alcohol/Drug Father      Hypertension Father      Depression Father      Osteoarthritis Father      Cardiovascular Maternal Grandmother      Depression Maternal Grandmother      Hypertension Maternal Grandmother      Alzheimer Disease Maternal Grandmother      Cardiovascular Maternal Grandfather      Hypertension Maternal Grandfather      Depression Maternal Grandfather      Alcohol/Drug Maternal Grandfather      Cardiovascular Paternal Grandmother      Hypertension Paternal Grandmother      Cardiovascular Paternal Grandfather      Hypertension Paternal Grandfather      Glaucoma No family hx of      Macular Degeneration No family hx of        Past/family/social history reviewed and no changes    PHYSICAL EXAMINATION:  Constitutional: aaox3, cooperative, pleasant, not dyspneic/diaphoretic, no acute distress  Vitals reviewed: /81   Pulse 98   Temp 97.9  F (36.6  C) (Oral)   SpO2 98%   Wt:   Wt Readings from Last 2 Encounters:   04/26/19 69.9 kg (154 lb)   04/16/19 70.8 kg " (156 lb 1.6 oz)      Eyes: Sclera anicteric/injected  Ears/nose/mouth/throat: Normal oropharynx without ulcers or exudate, mucus membranes moist, hearing intact  Neck: supple, thyroid normal size  CV: No edema  Respiratory: Unlabored breathing  Lymph: No axillary, submandibular, supraclavicular or inguinal lymphadenopathy  Abd: scar at umbilicus. Tattoo in left lower quadrant. Soft, nondistended, +bs, no hepatosplenomegaly, mild TTP in epigastric area and LUQ, no peritoneal signs  Skin: warm, perfused, no jaundice  Psych: Normal affect  MSK: ankle in protective boot     PERTINENT STUDIES:  CT 11/3/2017: mobile cecum in LUQ (was in RUQ on 4/2017)  CT 1/8/2016: normal   UGIS 1/15: normal, delayed colon transit, significant stool burden     Labs 1/8 showed Hgb 10.9, MCV 88, RDW 13.9  12/2017: crp 12, esr 13   CMP normal     Documentation Only on 01/03/2018   Component Date Value Ref Range Status     Copath Report 01/03/2018    Final                    Value:Patient Name: LUCINA BAH  MR#: [id number]  Specimen #:   Collected: 1/3/2018  Received: 1/4/2018  Reported: 1/8/2018 10:28  Ordering Phy(s): HELEN IVAN  Additional Phy(s): [facility]: [facility]    For improved result formatting, select 'View Enhanced Report Format' under   Linked Documents section.    SPECIMEN(S):  Esophageal nodule    FINAL DIAGNOSIS:  ESOPHAGEAL NODULE, BIOPSY:  - Fragments of squamous and columnar mucosa consistent with the   squamocolumnar junction. - The squamous mucosa  is unremarkable with no evidence of reflux or other abnormality  - The columnar mucosa is gastric Cardia with mild chronic inflammation  - No evidence of intestinal metaplasia, dysplasia or malignancy    COMMENT:  Multiple tissue levels were cut on this case and no definitive cause for a   nodule was identified  microscopically.    I have personally reviewed all specimens and/or slides, including the   listed special stains, and used  "them  with my medical ju                          dgement to determine or confirm the final diagnosis.    Electronically signed out by:    Kasi Orosco M.D., [facility]    CLINICAL HISTORY:  22-year-old female with abdominal pain failing to respond to treatment.  GROSS:  The specimen is received in formalin with proper patient identification,   labeled \"esophagus nodule\".  The  specimen consists of a 0.2 cm in greatest dimension white soft tissue   fragment.  The specimen is wrapped and  entirely submitted in cassette A1. (Dictated by: Tea White [facility]   1/4/2018 10:02 AM)    MICROSCOPIC:  Microscopic examination was performed.    CPT Codes:  A: 53265-BG1    TESTING LAB LOCATION:  [facility], [address]   [id number] [phone]    COLLECTION SITE:  Client: [facility]  Location: [facility] (B)           3/27/19  There is a large colonic stool burden. This causes distention of the  majority of the colon, with the exception of the distal sigmoid and  rectum, as well as dilatation of the distal ileum which contains  fecalized material. Findings concerning for sigmoid stricture and low  grade obstruction.       3/30/19   IMPRESSION:   Significant colonic stool burden has substantially improved from prior  examination, with resolution of previously seen fecalized material in  the distal ileum. No findings to suggest bowel obstruction.        "

## 2019-05-01 NOTE — PATIENT INSTRUCTIONS
It was a pleasure taking care of you today.  I've included a brief summary of our discussion and care plan from today's visit below.  Please review this information with your primary care provider.  ______________________________________________________________________    My recommendations are summarized as follows:    -- start Linzess 290 mcg daily   -- ok to continue Miralax- if stools become too loose, decrease to once a day  -- ok to continue dulocolax   -- try stopping lactulose and senna     -- if you go 2-3 days without a bowel movement    -increase Miralax   -then can add back senna or lactulose     -- meet with our dietitian (Jose) to discuss slow gastric emptying diet and gas producing foods     -- continue to avoid constipating medications if possible (especially zofran, bentyl, levsin if possible)     Return to GI Clinic in 1 month to review your progress.    ______________________________________________________________________    Who do I call with any questions after my visit?  Please be in touch if there are any further questions that arise following today's visit.  There are multiple ways to contact your gastroenterology care team.        During business hours, you may reach a Gastroenterology nurse at [phone]      To schedule or reschedule an appointment, please call [phone].       You can always send a secure message through [employer].  [employer] messages are answered by your nurse or doctor typically within 24 hours.  Please allow extra time on weekends and holidays.        For urgent/emergent questions after business hours, you may reach the on-call GI Fellow by contacting the [facility] at [phone].     How will I get the results of any tests ordered?    You will receive all of your results.  If you have signed up for MyChart, any tests ordered at your visit will be available to you after your physician reviews them.  Typically this takes 1-2 weeks.  If  there are urgent results that require a change in your care plan, your physician or nurse will call you to discuss the next steps.      What is [employer]?  [employer] is a secure way for you to access all of your healthcare records from the [facility].  It is a web based computer program, so you can sign on to it from any location.  It also allows you to send secure messages to your care team.  I recommend signing up for [employer] access if you have not already done so and are comfortable with using a computer.      How to I schedule a follow-up visit?  If you did not schedule a follow-up visit today, please call [phone] to schedule a follow-up office visit.        Sincerely,    Sandhya Encarnacion PA-C  Division of Gastroenterology, Hepatology & Nutrition  [facility]

## 2019-05-02 ENCOUNTER — MYC MEDICAL ADVICE (OUTPATIENT)
Dept: PODIATRY | Facility: CLINIC | Age: 24
End: 2019-05-02

## 2019-05-02 ENCOUNTER — NURSE TRIAGE (OUTPATIENT)
Dept: CALL CENTER | Age: 24
End: 2019-05-02

## 2019-05-02 ENCOUNTER — MEDICAL CORRESPONDENCE (OUTPATIENT)
Dept: HEALTH INFORMATION MANAGEMENT | Facility: CLINIC | Age: 24
End: 2019-05-02

## 2019-05-02 ENCOUNTER — TELEPHONE (OUTPATIENT)
Dept: INFECTIOUS DISEASES | Facility: CLINIC | Age: 24
End: 2019-05-02

## 2019-05-02 ENCOUNTER — HOSPITAL ENCOUNTER (EMERGENCY)
Facility: CLINIC | Age: 24
Discharge: HOME | End: 2019-05-02
Attending: FAMILY MEDICINE | Admitting: FAMILY MEDICINE
Payer: COMMERCIAL

## 2019-05-02 VITALS
RESPIRATION RATE: 19 BRPM | BODY MASS INDEX: 27.46 KG/M2 | SYSTOLIC BLOOD PRESSURE: 122 MMHG | OXYGEN SATURATION: 98 % | HEART RATE: 82 BPM | WEIGHT: 155 LBS | TEMPERATURE: 98.7 F | DIASTOLIC BLOOD PRESSURE: 77 MMHG

## 2019-05-02 DIAGNOSIS — Z51.89 VISIT FOR WOUND CHECK: ICD-10-CM

## 2019-05-02 LAB
BASOPHILS # BLD AUTO: 0 10E9/L (ref 0–0.2)
BASOPHILS NFR BLD AUTO: 0.2 %
CRP SERPL-MCNC: <2.9 MG/L (ref 0–8)
DIFFERENTIAL METHOD BLD: ABNORMAL
EOSINOPHIL # BLD AUTO: 0.1 10E9/L (ref 0–0.7)
EOSINOPHIL NFR BLD AUTO: 1.8 %
ERYTHROCYTE [DISTWIDTH] IN BLOOD BY AUTOMATED COUNT: 12.7 % (ref 10–15)
ERYTHROCYTE [SEDIMENTATION RATE] IN BLOOD BY WESTERGREN METHOD: 8 MM/H (ref 0–20)
HCT VFR BLD AUTO: 32.4 % (ref 35–47)
HGB BLD-MCNC: 10.5 G/DL (ref 11.7–15.7)
IMM GRANULOCYTES # BLD: 0 10E9/L (ref 0–0.4)
IMM GRANULOCYTES NFR BLD: 0.2 %
LYMPHOCYTES # BLD AUTO: 1.9 10E9/L (ref 0.8–5.3)
LYMPHOCYTES NFR BLD AUTO: 40.7 %
MCH RBC QN AUTO: 28.4 PG (ref 26.5–33)
MCHC RBC AUTO-ENTMCNC: 32.4 G/DL (ref 31.5–36.5)
MCV RBC AUTO: 88 FL (ref 78–100)
MONOCYTES # BLD AUTO: 0.1 10E9/L (ref 0–1.3)
MONOCYTES NFR BLD AUTO: 3.1 %
NEUTROPHILS # BLD AUTO: 2.5 10E9/L (ref 1.6–8.3)
NEUTROPHILS NFR BLD AUTO: 54 %
NRBC # BLD AUTO: 0 10*3/UL
NRBC BLD AUTO-RTO: 0 /100
PLATELET # BLD AUTO: 205 10E9/L (ref 150–450)
RBC # BLD AUTO: 3.7 10E12/L (ref 3.8–5.2)
WBC # BLD AUTO: 4.6 10E9/L (ref 4–11)

## 2019-05-02 PROCEDURE — 25800030 ZZH RX IP 258 OP 636: Performed by: FAMILY MEDICINE

## 2019-05-02 PROCEDURE — 85652 RBC SED RATE AUTOMATED: CPT | Performed by: FAMILY MEDICINE

## 2019-05-02 PROCEDURE — 96365 THER/PROPH/DIAG IV INF INIT: CPT | Performed by: FAMILY MEDICINE

## 2019-05-02 PROCEDURE — 99284 EMERGENCY DEPT VISIT MOD MDM: CPT | Mod: 25 | Performed by: FAMILY MEDICINE

## 2019-05-02 PROCEDURE — 86140 C-REACTIVE PROTEIN: CPT | Performed by: FAMILY MEDICINE

## 2019-05-02 PROCEDURE — 87070 CULTURE OTHR SPECIMN AEROBIC: CPT | Performed by: FAMILY MEDICINE

## 2019-05-02 PROCEDURE — 85025 COMPLETE CBC W/AUTO DIFF WBC: CPT | Performed by: FAMILY MEDICINE

## 2019-05-02 PROCEDURE — 25000128 H RX IP 250 OP 636: Performed by: FAMILY MEDICINE

## 2019-05-02 PROCEDURE — 99284 EMERGENCY DEPT VISIT MOD MDM: CPT | Mod: Z6 | Performed by: FAMILY MEDICINE

## 2019-05-02 RX ORDER — HEPARIN SODIUM,PORCINE 10 UNIT/ML
5-10 VIAL (ML) INTRAVENOUS EVERY 24 HOURS
Status: DISCONTINUED | OUTPATIENT
Start: 2019-05-02 | End: 2019-05-02 | Stop reason: HOSPADM

## 2019-05-02 RX ORDER — LIDOCAINE 40 MG/G
CREAM TOPICAL
Status: DISCONTINUED | OUTPATIENT
Start: 2019-05-02 | End: 2019-05-02 | Stop reason: HOSPADM

## 2019-05-02 RX ADMIN — DAPTOMYCIN 400 MG: 500 INJECTION, POWDER, LYOPHILIZED, FOR SOLUTION INTRAVENOUS at 19:51

## 2019-05-02 NOTE — DISCHARGE INSTRUCTIONS
Thank you for choosing [facility].     Please closely monitor for further symptoms. Return to the Emergency Department if you develop any new or worsening signs or symptoms.    If you received any opiate pain medications or sedatives during your visit, please do not drive for at least 8 hours.     Labs, cultures or final xray interpretations may still need to be reviewed.  We will call you if your plan of care needs to be changed.    Please make an appointment to follow up with Infectious Disease Clinic (phone: [phone]) in 1-2 days.  Return to the emergency department if you develop a fever, redness, increasing pain, or other new concerns.

## 2019-05-02 NOTE — TELEPHONE ENCOUNTER
23 year old female calls today to be seen in ID clinic. She was scheduled at 3 pm but then there was a mix up in the schedule and she was told not to come in.  She called back upset and stated she could not come in tomorrow -she did not have a ride.    Samara has a complex medical history of Bechet's syndrome, s/p left ankle fusion (1/2/19), developed infection, treated with vancomycin and had hardware removed (3/12/19), developed renal injury from vancomycin so was switched to daptomycin (through picc line).     She states she had been on antibiotics until yesterday and her foot was healing nicely.  Today her incision opened and is draining a dark yellow fluid. Denies fever    Spoke with ID clinic   They do not have an opening today and they discussed case with MD in clinic who advised Samara to go to the ER.  I spoke with her about going to the ER   She was very hesitant at first but then agreed to go..

## 2019-05-02 NOTE — ED AVS SNAPSHOT
[facility], [place], Emergency Department  [address]  [facility] [address]  Phone:  [phone]  Fax:  [phone]                                    Samara Oropeza   MRN: [id number]    Department:  [facility], Emergency Department   Date of Visit:  5/2/2019           After Visit Summary Signature Page    I have received my discharge instructions, and my questions have been answered. I have discussed any challenges I see with this plan with the nurse or doctor.    ..........................................................................................................................................  Patient/Patient Representative Signature      ..........................................................................................................................................  Patient Representative Print Name and Relationship to Patient    ..................................................               ................................................  Date                                   Time    ..........................................................................................................................................  Reviewed by Signature/Title    ...................................................              ..............................................  Date                                               Time          22EPIC Rev 08/18

## 2019-05-02 NOTE — TELEPHONE ENCOUNTER
[facility] Symptoms Nurse from Call [facility] called to report pt finished her IV  Abx yesterday and now this morning her wound incision broke open and it is draining dark fluid. She was given a 30 min apt with Dr Dickinson today however it was scheduled incorrectly as pt is new to Dr Dickinson so apt needs to be 60 min. Pt was on her way to this apt when [facility] Center called her back to let her know this apt will not work. Pt was then offered an 8:30 am apt with Dr Major tomorrow morning 5/3, however pt reports she has no ride tomorrow morning. Explained to [facility] nurse if pt wants to save her foot, she will need to go to the ED to be evaluated, cultured and then begin treatment. Nurse agreed with this plan, and agreed to discuss with pt.  Sandhya Chávez RN

## 2019-05-02 NOTE — ED PROVIDER NOTES
"    [facility] [place] [facility] ([facility])    5/02/19        History     Chief Complaint   Patient presents with     Wound Infection     staff ifxn: still has picc line: today in shower wound opened up: pus came out of wound: bad swelling; no current fever     The history is provided by the patient and medical records.     Samara Oropeza is a 23 year old female with a past medical history significant for Bechet's syndrome, s/p left ankle fusion (1/2/2019) then developed an infection and was treated with vancomycin and has had hardware removed on 3/12/2019, then developed a renal injury to vancomycin so was switched to Daptomycin (through PICC line), as well as a medical history significant for tourette's, seizures, irregular heart beat, gastroparesis and GERD. The patient presents to the Emergency Department today due to her wound on her left ankle opening up. Patient reports that her Daptomycin ended yesterday. Patient states that she was getting out of the shower and cleaning her wound, when it began to open up causing bleeding, began \"pussing and puffing\". Patient denies fevers.  She called the infectious disease clinic and was initially given an appointment, however due to some logistical confusion the appointment was canceled and she was advised to come to the emergency department today instead for her symptoms.     I have reviewed the Medications, Allergies, Past Medical and Surgical History, and Social History in the [employer] system.    Past Medical History:   Diagnosis Date     Anemia      Anxiety      Arthritis      Behcet's disease (H)      Cervical adenitis May 2010     Chronic abdominal pain      Constipation, chronic 1994     Fibromyalgia      Gastro-oesophageal reflux disease      Gastroparesis      Irregular heart beat     tachycardia, has had workup     Migraines      Neuromuscular disorder (H)     fibramyalgia     Palpitations      Seizure (H)      Seizures (H)     unknown etiology     " Syncope      Tourette's        Past Surgical History:   Procedure Laterality Date     ARTHROSCOPY ANKLE, REPAIR LIGAMENT Left 1/2/2019    Procedure: Ankle arthroscopy and sinus tarsi evacuation, ligament repair, left lower extremity;  Surgeon: Andres Johnson DPM;  Location: [facility] OR     ARTHROSCOPY KNEE WITH PATELLAR REALIGNMENT  7/25/2013    Procedure: ARTHROSCOPY KNEE WITH PATELLAR REALIGNMENT;  Left Knee Arthroscopy, Medial Patellofemoral Ligament Reconstruction with Allograft  ;  Surgeon: Jennifer Acevedo MD;  Location: [facility] OR     COLONOSCOPY  2015     DENTAL SURGERY  1996    Teeth removal     ENDOSCOPY UPPER, COLONOSCOPY, COMBINED  2005     HC ESOPH/GAS REFLUX TEST W NASAL IMPED >1 HR N/A 2/15/2017    Procedure: ESOPHAGEAL IMPEDENCE FUNCTION TEST WITH 24 HOUR PH GREATER THAN 1 HOUR;  Surgeon: Timothy Matta MD;  Location: [facility] GI     IR PICC PLACEMENT > 5 YRS OF AGE  3/13/2019     IRRIGATION AND DEBRIDEMENT FOOT, COMBINED Left 3/12/2019    Procedure: COMBINED IRRIGATION AND DEBRIDEMENT LEFT ANKLE;  Surgeon: Micha Glover MD;  Location: [facility] OR       Family History   Problem Relation Age of Onset     Depression Mother      Neurologic Disorder Mother         Migraines, take imitrex injection.  Also in maternal grandmother.       Alcohol/Drug Father      Hypertension Father      Depression Father      Osteoarthritis Father      Cardiovascular Maternal Grandmother      Depression Maternal Grandmother      Hypertension Maternal Grandmother      Alzheimer Disease Maternal Grandmother      Cardiovascular Maternal Grandfather      Hypertension Maternal Grandfather      Depression Maternal Grandfather      Alcohol/Drug Maternal Grandfather      Cardiovascular Paternal Grandmother      Hypertension Paternal Grandmother      Cardiovascular Paternal Grandfather      Hypertension Paternal Grandfather      Glaucoma No family hx of      Macular Degeneration No family hx of        Social History      Tobacco Use     Smoking status: Never Smoker     Smokeless tobacco: Never Used   Substance Use Topics     Alcohol use: Yes     Alcohol/week: 0.6 oz     Types: 1 Standard drinks or equivalent per week     Comment: rarely       Current Facility-Administered Medications   Medication     DAPTOmycin (CUBICIN) 400 mg in sodium chloride 0.9 % 100 mL intermittent infusion     heparin lock flush 10 UNIT/ML injection 5 mL     Current Outpatient Medications   Medication     amitriptyline (ELAVIL) 25 MG tablet     artificial tears OINT ophthalmic ointment     aspirin (ASPIRIN CHILDRENS) 81 MG chewable tablet     bisacodyl (DULCOLAX) 5 MG EC tablet     citalopram (CELEXA) 10 MG tablet     colchicine (COLCYRS) 0.6 MG tablet     cyproheptadine (PERIACTIN) 4 MG tablet     dicyclomine (BENTYL) 20 MG tablet     diphenhydrAMINE (BENADRYL) 25 MG tablet     gabapentin (NEURONTIN) 100 MG capsule     guanFACINE (TENEX) 1 MG tablet     hyoscyamine (ANASPAZ/LEVSIN) 0.125 MG tablet     hypromellose (GENTEAL) 0.3 % SOLN     lactulose 20 GM/30ML SOLN     lidocaine (LMX4) 4 % external cream     linaclotide (LINZESS) 290 MCG capsule     Magic Mouthwash (FV std formula) lidocaine visc 2% 2.5mL/5mL & maalox/mylanta w/ simeth 2.5mL/5mL & diphenhydrAMINE 5mg/5mL     norgestimate-ethinyl estradiol (ORTHO-CYCLEN/SPRINTEC) 0.25-35 MG-MCG tablet     omeprazole (PRILOSEC) 40 MG capsule     ondansetron (ZOFRAN-ODT) 8 MG ODT tab     polyethylene glycol (MIRALAX/GLYCOLAX) powder     sennosides (SENOKOT) 8.6 MG tablet     SPRINTEC 28 0.25-35 MG-MCG tablet     sucralfate (CARAFATE) 1 GM/10ML suspension     albuterol (PROAIR HFA/PROVENTIL HFA/VENTOLIN HFA) 108 (90 BASE) MCG/ACT Inhaler     benzocaine (TOPICALE XTRA) 20 % GEL     betamethasone valerate (VALISONE) 0.1 % cream     clobetasol (TEMOVATE) 0.05 % cream     EPINEPHrine (EPIPEN 2-EAMON) 0.3 MG/0.3ML injection     hydrocortisone 1 % CREA cream     LORazepam (ATIVAN) 0.5 MG tablet     nitroFURantoin  macrocrystal-monohydrate (MACROBID) 100 MG capsule     order for DME     order for DME     order for DME     order for DME     sodium chloride 0.9% SOLN BOLUS        Allergies   Allergen Reactions     Amoxil [Penicillins] Rash     Dad unsure of reaction.     Bee Venom Anaphylaxis     Contrast Dye Rash     Contrast Media Ready-Box [facility] [place], 04/09/2014.; Contrast [employer] [facility] [place], 04/09/2014.  NOTE: this is a contrast media oral with iodine. Premedicate with methylpred standard for IV contrast, request barium contrast for oral contrast.     Orange Fruit [Citrus] Anaphylaxis     Pineapple Anaphylaxis, Difficulty breathing and Rash     Reglan [Metoclopramide] Other (See Comments)     IV dose only, in ER, rapid heart rate.     Ace Inhibitors      Difficulty in breathing and GI upset     Amitiza [Lubiprostone] Nausea and Vomiting     Amoxicillin-Pot Clavulanate      Midazolam Unknown     Other reaction(s): Unknown  parent states that when pt takes this medication, she wakes up being very violent .  parent states that when pt takes this medication, she wakes up being very violent .     No Clinical Screening - See Comments      Bleech/ chest tightness, itchy throat, swollen tongue, hives     Tizanidine Other (See Comments)     Confusion, back pain, photophobia, abdominal pain, shaking, anxious       Versed      Coming out of pelvic exam at age of 5, was kicking and screaming when coming out of the versed.     Adhesive Tape Rash     Azithromycin Hives and Rash     Cephalexin Itching and Rash     Itchy mouth  Itchy mouth     Keflex [Cephalexin-Fd&C Yellow #6] Hives         Review of Systems   ROS: 14 point ROS neg other than the symptoms noted above in the HPI.      Physical Exam   BP: 126/80  Pulse: 96  Temp: 98.3  F (36.8  C)  Resp: 16  Weight: 70.3 kg (155 lb)  SpO2: 98 %      Physical Exam   Constitutional: She is oriented to person, place, and time. She appears well-developed and well-nourished. No distress.   HENT:    Head: Normocephalic and atraumatic.   Eyes: Pupils are equal, round, and reactive to light.   Neck: Neck supple.   Cardiovascular: Normal rate and intact distal pulses.   Pulmonary/Chest: Effort normal.   Musculoskeletal: Normal range of motion. She exhibits tenderness.   See photo below regarding left ankle incision.  I am unable to express any fluid and I feel no warmth, erythema, fluctuance or induration.   Neurological: She is alert and oriented to person, place, and time.   Skin: Skin is warm and dry.           ED Course   4:11 PM  The patient was seen and examined by Cornell Yip MD in Room ED08.        Procedures             Critical Care time:  none             Labs Ordered and Resulted from Time of ED Arrival Up to the Time of Departure from the ED   CBC WITH PLATELETS DIFFERENTIAL - Abnormal; Notable for the following components:       Result Value    RBC Count 3.70 (*)     Hemoglobin 10.5 (*)     Hematocrit 32.4 (*)     All other components within normal limits   CRP INFLAMMATION   ERYTHROCYTE SEDIMENTATION RATE AUTO   WOUND CULTURE AEROBIC BACTERIAL            Assessments & Plan (with Medical Decision Making)   Patient with a complicated history of left ankle infection and orthopedic procedures who has been on daptomycin at home.  Today she noticed a small pinhole opening in her incision with drainage 1 day after stopping daptomycin.  Unfortunately was not able to access appointment at the infectious disease clinic and so was referred to the ED.  Here she has normal vital signs, afebrile, completely nontoxic appearance.  No significant drainage was appreciated in the ED.  There is a small pinhole opening and I did culture this area.  Her white blood cell count sed rate and CRP are all normal.  Case discussed with on-call infectious disease.  I will give her today's dose of daptomycin in the emergency department and have her follow-up in the infectious disease clinic tomorrow if possible.  A  photograph of the incision area was placed in the chart for infectious disease to review if they are unable to see her tomorrow.  We discussed the indications for emergency department return and follow-up.  Stable for discharge.      I have reviewed the nursing notes.    I have reviewed the findings, diagnosis, plan and need for follow up with the patient.       Medication List      There are no discharge medications for this visit.         Final diagnoses:   Visit for wound check     I, Nelson Ravi, am serving as a trained medical scribe to document services personally performed by Cornell Yip MD, based on the provider's statements to me.   I, Cornell Yip MD, was physically present and have reviewed and verified the accuracy of this note documented by Nelson Ravi.    5/2/2019   [facility], [place], EMERGENCY DEPARTMENT     Cornell Yip MD  05/02/19 [id number]

## 2019-05-02 NOTE — PROGRESS NOTES
This is a recent snapshot of the patient's [facility] medical record.  For current drug dose and complete information and questions, call [phone] or In Basket pool, [facility] home infusion ([id number])  CSN Number:  [id number]

## 2019-05-02 NOTE — PROGRESS NOTES
Clinic Care Coordination Contact  Care Team Conversations    SW completed chart review. RN AHMET is currently following pt with medical concerns.   SW will no long be following Pt at this time.       ADRIANA Martinez  Clinic Care Coordinator   [facility]   [phone]

## 2019-05-03 ENCOUNTER — ANCILLARY PROCEDURE (OUTPATIENT)
Dept: GENERAL RADIOLOGY | Facility: CLINIC | Age: 24
End: 2019-05-03
Attending: INTERNAL MEDICINE
Payer: COMMERCIAL

## 2019-05-03 ENCOUNTER — HOME INFUSION (PRE-WILLOW HOME INFUSION) (OUTPATIENT)
Dept: PHARMACY | Facility: CLINIC | Age: 24
End: 2019-05-03

## 2019-05-03 ENCOUNTER — OFFICE VISIT (OUTPATIENT)
Dept: INFECTIOUS DISEASES | Facility: CLINIC | Age: 24
End: 2019-05-03
Attending: INTERNAL MEDICINE
Payer: COMMERCIAL

## 2019-05-03 ENCOUNTER — MYC MEDICAL ADVICE (OUTPATIENT)
Dept: INFECTIOUS DISEASES | Facility: CLINIC | Age: 24
End: 2019-05-03

## 2019-05-03 VITALS
DIASTOLIC BLOOD PRESSURE: 86 MMHG | TEMPERATURE: 98.4 F | OXYGEN SATURATION: 97 % | HEIGHT: 63 IN | BODY MASS INDEX: 27.45 KG/M2 | SYSTOLIC BLOOD PRESSURE: 122 MMHG | HEART RATE: 99 BPM | WEIGHT: 154.9 LBS

## 2019-05-03 DIAGNOSIS — A49.01 MSSA (METHICILLIN SUSCEPTIBLE STAPHYLOCOCCUS AUREUS) INFECTION: ICD-10-CM

## 2019-05-03 DIAGNOSIS — T81.49XA WOUND INFECTION AFTER SURGERY: Primary | ICD-10-CM

## 2019-05-03 DIAGNOSIS — S90.512D INFECTED ABRASION OF LEFT ANKLE, SUBSEQUENT ENCOUNTER: ICD-10-CM

## 2019-05-03 DIAGNOSIS — Z79.2 ENCOUNTER FOR LONG-TERM (CURRENT) USE OF ANTIBIOTICS: ICD-10-CM

## 2019-05-03 DIAGNOSIS — L08.9 INFECTED ABRASION OF LEFT ANKLE, SUBSEQUENT ENCOUNTER: ICD-10-CM

## 2019-05-03 DIAGNOSIS — T81.31XA DEHISCENCE OF OPERATIVE WOUND, INITIAL ENCOUNTER: ICD-10-CM

## 2019-05-03 LAB — RADIOLOGIST FLAGS: NORMAL

## 2019-05-03 PROCEDURE — G0463 HOSPITAL OUTPT CLINIC VISIT: HCPCS | Mod: ZF

## 2019-05-03 RX ORDER — DAPTOMYCIN 350 MG/7ML
400 INJECTION, POWDER, LYOPHILIZED, FOR SOLUTION INTRAVENOUS DAILY
Qty: 14 EACH | COMMUNITY
Start: 2019-05-03 | End: 2019-05-03

## 2019-05-03 RX ORDER — DAPTOMYCIN 350 MG/7ML
400 INJECTION, POWDER, LYOPHILIZED, FOR SOLUTION INTRAVENOUS DAILY
Qty: 19 EACH | Refills: 0 | Status: ON HOLD | COMMUNITY
Start: 2019-05-04 | End: 2019-05-10

## 2019-05-03 ASSESSMENT — ENCOUNTER SYMPTOMS
EYE WATERING: 1
CONSTIPATION: 1
JAUNDICE: 0
POLYPHAGIA: 0
TASTE DISTURBANCE: 1
ARTHRALGIAS: 1
BRUISES/BLEEDS EASILY: 1
NERVOUS/ANXIOUS: 1
DOUBLE VISION: 1
HEARTBURN: 1
PALPITATIONS: 1
NAUSEA: 1
FLANK PAIN: 0
PANIC: 1
SPEECH CHANGE: 0
MEMORY LOSS: 1
BOWEL INCONTINENCE: 0
SMELL DISTURBANCE: 0
SYNCOPE: 1
DECREASED APPETITE: 1
SNORES LOUDLY: 0
SEIZURES: 0
TINGLING: 1
DIZZINESS: 1
ORTHOPNEA: 0
POOR WOUND HEALING: 0
MUSCLE CRAMPS: 1
DYSURIA: 0
MUSCLE WEAKNESS: 1
SINUS PAIN: 1
ABDOMINAL PAIN: 1
DECREASED CONCENTRATION: 1
EYE IRRITATION: 1
HEMOPTYSIS: 0
DISTURBANCES IN COORDINATION: 1
TROUBLE SWALLOWING: 1
DIFFICULTY URINATING: 0
PARALYSIS: 1
HYPERTENSION: 1
COUGH: 1
HYPOTENSION: 1
NIGHT SWEATS: 1
LIGHT-HEADEDNESS: 1
DECREASED LIBIDO: 0
HOT FLASHES: 0
HEADACHES: 1
BACK PAIN: 1
DYSPNEA ON EXERTION: 1
SPUTUM PRODUCTION: 1
SHORTNESS OF BREATH: 1
EYE REDNESS: 0
POLYDIPSIA: 1
STIFFNESS: 1
BLOOD IN STOOL: 1
CHILLS: 1
INSOMNIA: 1
WEIGHT LOSS: 1
HEMATURIA: 0
VOMITING: 1
FEVER: 1
FATIGUE: 1
SINUS CONGESTION: 1
WEIGHT GAIN: 1
BLOATING: 1
JOINT SWELLING: 1
POSTURAL DYSPNEA: 0
SORE THROAT: 1
NECK MASS: 0
TREMORS: 0
HALLUCINATIONS: 0
DIARRHEA: 1
SKIN CHANGES: 0
COUGH DISTURBING SLEEP: 1
NAIL CHANGES: 0
MYALGIAS: 1
NUMBNESS: 1
DEPRESSION: 0
HOARSE VOICE: 1
LOSS OF CONSCIOUSNESS: 1
WHEEZING: 1
EYE PAIN: 1
SWOLLEN GLANDS: 0
SLEEP DISTURBANCES DUE TO BREATHING: 1
RECTAL PAIN: 0
EXERCISE INTOLERANCE: 1
LEG PAIN: 1
NECK PAIN: 1
WEAKNESS: 1
ALTERED TEMPERATURE REGULATION: 1

## 2019-05-03 ASSESSMENT — PAIN SCALES - GENERAL: PAINLEVEL: MODERATE PAIN (4)

## 2019-05-03 ASSESSMENT — MIFFLIN-ST. JEOR: SCORE: 1421.75

## 2019-05-03 NOTE — LETTER
5/3/2019      RE: Samara Oropeza  [address]  [place] [address]       Division of Infectious Diseases and [facility] Medicine  Department of Medicine        INFECTIOUS DISEASE CLINIC OUTPATIENT VISIT NOTE [place] Mail [address] [facility] [place] [address]  Office: [phone]  Fax:  [phone]     HISTORY OF PRESENT ILLNESS:  Ms. Samara Oropeza is a 23-year-old woman with a history of Bechet's syndrome (on colchicine), fibromylagia, GERD and irritable bowel syndrome who returns to Infectious Disease Clinic today for follow-up of a left ankle MSSA / coagulase negative Staph infection s/p a left ankle ankle arthroscopic synovectomy on 1/2/19 which subsequently developed persistent wound drainage.  She is accompanied to clinic today by her long-term boyfriend.   She was most recently previously seen in Infectious Disease Clinic by Dr. Mehta on 4/23/19 and more recently was seen in the [facility] emergency room yesterday, 5/2/19.  Her left ankle post-operative infection had required I&D on 3/12/19 (with the cultures growing MSSA plus also coagulase negative Staph thought to be a possible contamint) and she had since been on home IV antibiotic therapy via [facility] -- first with IV vancomycin (due to a past history of rash / urticaria on penicillins / cephalosporins per her father), then with IV daptomycin (due to JOSE JUAN necessitating the switch on 4/1/19) -- until 5/1/19.  She has no ongoing hardware in her ankle -- that had been placed in ~ 7/13 but was removed on 3/12/19 (per the patient).  When last seen in ID Clinic on 4/23/19, she still has some mild-moderate left inferior leg discomfort and mild lateral malleolar region palpation and ankle rotation tenderness, but was otherwise feeling well (except for a URI from ~ 4/21 - 4/26/19 shared by her boyfriend and now resolved for about a week).  Her ankle incision was closed and lacked inflammation at that time.  Thus,  "Dr. Mehta continued the plan of completing the six week course of vancomycin/daptomycin on 5/1/19.  The right arm PICC line has not yet been removed, however -- that was to be done later yesterday.  Just after showering yesterday morning while cleaning the incision site, however, the mid-posterior portion of her left ankle incision opened and began draining \"dark fluid with sometimes red or white chunky spots\".  She was unable to be seen in the clinic of her [facility] podiatrist, Dr. Andres Johnson, and was offered an ID Clinic appointment for this morning which Ms. Oropeza was unable to attend.  There was also a scheduling snafu with an afternoon ID Clinic appointment yesterday so she was unable to be seen.  Thus, she was referred to the [facility] emergency room yesterday late afternoon where a small mid-incisional dehiscence was noted with slight drainage and ongoing tenderness but no superficial visible inflammation.  A picture was taken in the ER and is in her chart.  A superficial swab culture was obtained at the open spot of the wound and has so far not yielded any growth.  She was given a dose of IV daptomycin and referred to ID Clinic for early follow-up which was re-scheduled with me for this afternoon.  She says that her ankle feels unchanged from the past week or two in terms of tenderness although she thinks the lateral malleolar region is a bit more puffy and the wound has continued to ooze serosanguinous, somewhat \"chunky\" fluid today.  There is a small ~ 2 cm spot of dried drainage on the bandage that was placed several hours ago.  Beyond the ankle, she otherwise continues to feel well, without recent fever, chills, sweats, anorexia, unusual fatigue, rash elsewhere, new myalgias/arthralgias elsewhere, cough, dyspnea, new EENT symptoms, chest pain, nausea, unusual diarrhea, abdominal pain, genitourinary symptoms, unusual headache or other new neurological symptoms.  Her right arm PICC " "line (placed 3/12/19) had some difficulty with infusion flow until several weeks ago but was treated with \"heparin\" by [facility] and has since functioned well.  The PICC has some aamir-insertion chronic tenderness to palpation and movement, but no other signs of inflammation.  She tolerated IV daptomycin without difficulty (except for some odd bilateral hand numbness that she attributed to the daptomycin for the first two or so weeks but spontanoeusly resolved for the final two+ weeks of her daptomycin course while she was still on it).    PAST MEDICAL HISTORY:  Past Medical History:   Diagnosis Date     Anemia      Anxiety      Arthritis      Behcet's disease (H)      Cervical adenitis May 2010     Chronic abdominal pain      Constipation, chronic 1994     Fibromyalgia      Gastro-oesophageal reflux disease      Gastroparesis      Irregular heart beat     tachycardia, has had workup     Migraines      Neuromuscular disorder (H)     fibramyalgia     Palpitations      Seizure (H)      Seizures (H)     unknown etiology     Syncope      Tourette's      Past Surgical History:   Procedure Laterality Date     ARTHROSCOPY ANKLE, REPAIR LIGAMENT Left 1/2/2019    Procedure: Ankle arthroscopy and sinus tarsi evacuation, ligament repair, left lower extremity;  Surgeon: Andres Johnson DPM;  Location:  OR     ARTHROSCOPY KNEE WITH PATELLAR REALIGNMENT  7/25/2013    Procedure: ARTHROSCOPY KNEE WITH PATELLAR REALIGNMENT;  Left Knee Arthroscopy, Medial Patellofemoral Ligament Reconstruction with Allograft  ;  Surgeon: Jennifer Acevedo MD;  Location: [facility] OR     COLONOSCOPY  2015     DENTAL SURGERY  1996    Teeth removal     ENDOSCOPY UPPER, COLONOSCOPY, COMBINED  2005     HC ESOPH/GAS REFLUX TEST W NASAL IMPED >1 HR N/A 2/15/2017    Procedure: ESOPHAGEAL IMPEDENCE FUNCTION TEST WITH 24 HOUR PH GREATER THAN 1 HOUR;  Surgeon: Timothy Matta MD;  Location: [facility] GI     IR PICC PLACEMENT > 5 YRS OF AGE  3/13/2019     IRRIGATION " AND DEBRIDEMENT FOOT, COMBINED Left 3/12/2019    Procedure: COMBINED IRRIGATION AND DEBRIDEMENT LEFT ANKLE;  Surgeon: Micha Glover MD;  Location: [facility] OR     ALLERGIES:  Allergies   Allergen Reactions     Amoxil [Penicillins] Rash     Dad unsure of reaction.     Bee Venom Anaphylaxis     Contrast Dye Rash     Contrast Media Ready-[facility], 04/09/2014.; Contrast Media Ready-[facility], 04/09/2014.  NOTE: this is a contrast media oral with iodine. Premedicate with methylpred standard for IV contrast, request barium contrast for oral contrast.     Orange Fruit [Citrus] Anaphylaxis     Pineapple Anaphylaxis, Difficulty breathing and Rash     Reglan [Metoclopramide] Other (See Comments)     IV dose only, in ER, rapid heart rate.     Ace Inhibitors      Difficulty in breathing and GI upset     Amitiza [Lubiprostone] Nausea and Vomiting     Amoxicillin-Pot Clavulanate      Midazolam Unknown     Other reaction(s): Unknown  parent states that when pt takes this medication, she wakes up being very violent .  parent states that when pt takes this medication, she wakes up being very violent .     No Clinical Screening - See Comments      Bleech/ chest tightness, itchy throat, swollen tongue, hives     Tizanidine Other (See Comments)     Confusion, back pain, photophobia, abdominal pain, shaking, anxious       Versed      Coming out of pelvic exam at age of 5, was kicking and screaming when coming out of the versed.     Adhesive Tape Rash     Azithromycin Hives and Rash     Cephalexin Itching and Rash     Itchy mouth  Itchy mouth     Keflex [Cephalexin-Fd&C Yellow #6] Hives     MEDICATIONS:  Current Outpatient Medications   Medication Sig Dispense Refill     albuterol (PROAIR HFA/PROVENTIL HFA/VENTOLIN HFA) 108 (90 BASE) MCG/ACT Inhaler Inhale 2 puffs into the lungs every 6 hours as needed for shortness of breath / dyspnea or wheezing 1 Inhaler 1     amitriptyline (ELAVIL) 25 MG tablet Take 1 tablet (25 mg) by mouth  At Bedtime 30 tablet 1     artificial tears OINT ophthalmic ointment 0.5 inch strip each eye at night 1 Tube 11     aspirin (ASPIRIN CHILDRENS) 81 MG chewable tablet Take 81 mg by mouth as needed        benzocaine (TOPICALE XTRA) 20 % GEL Apply as needed locally to mouth or nasal ulcers for pain; 4 times daily as needed 30 g 1     betamethasone valerate (VALISONE) 0.1 % cream Apply topically 2 times daily       bisacodyl (DULCOLAX) 5 MG EC tablet Take 2 tablets (10 mg) by mouth daily as needed for constipation 30 tablet 0     citalopram (CELEXA) 10 MG tablet Take 1 tablet (10 mg) by mouth daily 30 tablet 1     clobetasol (TEMOVATE) 0.05 % cream Apply topically 2 times daily 60 g 0     colchicine (COLCYRS) 0.6 MG tablet Take 0.5 tablets (0.3 mg) by mouth daily       cyproheptadine (PERIACTIN) 4 MG tablet Take 1 tablet (4 mg) by mouth At Bedtime For nightmares 30 tablet 2     DAPTOmycin 350 MG SOLR Inject 400 mg into the vein daily 19 each 0     dicyclomine (BENTYL) 20 MG tablet Take 1 tablet (20 mg) by mouth 4 times daily as needed 120 tablet 3     diphenhydrAMINE (BENADRYL) 25 MG tablet Take 1 tablet (25 mg) by mouth 3 times daily as needed (itching) 40 tablet 1     EPINEPHrine (EPIPEN 2-EAMON) 0.3 MG/0.3ML injection Inject 0.3 mLs (0.3 mg) into the muscle as needed for anaphylaxis 0.6 mL 3     gabapentin (NEURONTIN) 100 MG capsule Take 1 capsule (100 mg) by mouth 3 times daily as needed (anxiety) 90 capsule 1     guanFACINE (TENEX) 1 MG tablet Take 3 tablets (3 mg) by mouth twice daily in the morning and evening. 180 tablet 3     hydrocortisone 1 % CREA cream Place rectally 2 times daily as needed for itching 28.35 g 1     hyoscyamine (ANASPAZ/LEVSIN) 0.125 MG tablet Take 1-2 tablets (125-250 mcg) by mouth every 4 hours as needed for cramping 40 tablet 1     hypromellose (GENTEAL) 0.3 % SOLN 1 drop every hour as needed for dry eyes        lactulose 20 GM/30ML SOLN Take 30 mLs by mouth 3 times daily as needed (for  constipation) 300 mL 3     lidocaine (LMX4) 4 % external cream Apply topically once as needed for mild pain       linaclotide (LINZESS) 290 MCG capsule Take 1 capsule (290 mcg) by mouth every morning (before breakfast) 90 capsule 3     LORazepam (ATIVAN) 0.5 MG tablet Take 1 tablet (0.5 mg) by mouth every 6 hours as needed for anxiety 10 tablet 0     Magic Mouthwash (FV std formula) lidocaine visc 2% 2.5mL/5mL & maalox/mylanta w/ simeth 2.5mL/5mL & diphenhydrAMINE 5mg/5mL Swish and swallow 10 mLs in mouth every 6 hours as needed for mouth sores 1 Bottle 1     nitroFURantoin macrocrystal-monohydrate (MACROBID) 100 MG capsule Take 1 capsule (100 mg) by mouth At Bedtime 90 capsule 1     norgestimate-ethinyl estradiol (ORTHO-CYCLEN/SPRINTEC) 0.25-35 MG-MCG tablet Take 1 tablet by mouth daily 84 tablet 4     omeprazole (PRILOSEC) 40 MG capsule Take 1 capsule (40 mg) by mouth daily 90 capsule 3     ondansetron (ZOFRAN-ODT) 8 MG ODT tab Take 1 tablet (8 mg) by mouth every 8 hours as needed for nausea 90 tablet 1     order for DME Roll-A-Bout Walker. Patient can use for 3 months 1 Units 0     order for DME Equipment being ordered: size 8 1/2 walking boot tall 1 Device 0     order for DME Equipment being ordered: RollAbout knee scooter 1 Device 0     order for DME Equipment being ordered: adult pair of crutches 1 Device 0     polyethylene glycol (MIRALAX/GLYCOLAX) powder Take 1 capful by mouth 3 times daily       sennosides (SENOKOT) 8.6 MG tablet Take 3 tablets by mouth 2 times daily 240 tablet 3     sodium chloride 0.9% SOLN BOLUS Inject 1,000 mLs into the vein daily as needed (IV abx and flushes) 1000 mL 11     SPRINTEC 28 0.25-35 MG-MCG tablet Take one tablet by mouth daily. Take continuously. 84 tablet 1     sucralfate (CARAFATE) 1 GM/10ML suspension Take 10 mLs (1 g) by mouth 4 times daily 1200 mL 2     SOCIAL HISTORY:  Social History     Socioeconomic History     Marital status: Single     Spouse name: Not on file      "Number of children: Not on file     Years of education: Not on file     Highest education level: Not on file   Occupational History     Not on file   Social Needs     Financial resource strain: Not on file     Food insecurity:     Worry: Not on file     Inability: Not on file     Transportation needs:     Medical: Not on file     Non-medical: Not on file   Tobacco Use     Smoking status: Never Smoker     Smokeless tobacco: Never Used   Substance and Sexual Activity     Alcohol use: Yes     Alcohol/week: 0.6 oz     Types: 1 Standard drinks or equivalent per week     Comment: rarely     Drug use: No     Types: Marijuana     Comment: occassional marijuana only, denies others     Sexual activity: Not Currently     Partners: Male     Birth control/protection: Pill   Lifestyle     Physical activity:     Days per week: Not on file     Minutes per session: Not on file     Stress: Not on file   Relationships     Social connections:     Talks on phone: Not on file     Gets together: Not on file     Attends Cheondo[employer] service: Not on file     Active member of club or organization: Not on file     Attends meetings of clubs or organizations: Not on file     Relationship status: Not on file     Intimate partner violence:     Fear of current or ex partner: Not on file     Emotionally abused: Not on file     Physically abused: Not on file     Forced sexual activity: Not on file   Other Topics Concern     Parent/sibling w/ CABG, MI or angioplasty before 65F 55M? Not Asked   Social History Narrative    Boyfriend of 5 years, living with \"in laws\" Oct 2017 and looking forward to their own apartment.      FAMILY HISTORY:  Family History   Problem Relation Age of Onset     Depression Mother      Neurologic Disorder Mother         Migraines, take imitrex injection.  Also in maternal grandmother.       Alcohol/Drug Father      Hypertension Father      Depression Father      Osteoarthritis Father      Cardiovascular Maternal Grandmother      " "Depression Maternal Grandmother      Hypertension Maternal Grandmother      Alzheimer Disease Maternal Grandmother      Cardiovascular Maternal Grandfather      Hypertension Maternal Grandfather      Depression Maternal Grandfather      Alcohol/Drug Maternal Grandfather      Cardiovascular Paternal Grandmother      Hypertension Paternal Grandmother      Cardiovascular Paternal Grandfather      Hypertension Paternal Grandfather      Glaucoma No family hx of      Macular Degeneration No family hx of      REVIEW OF SYSTEMS:  As per HPI.  Complete ROS is otherwise negative.    PHYSICAL EXAMINATION:  General:  A pleasant, conversant, WDWN, 23-year-old woman in NAD.  Vital signs:  /86   Pulse 99   Temp 98.4  F (36.9  C) (Oral)   Ht 1.6 m (5' 3\")   Wt 70.3 kg (154 lb 14.4 oz)   LMP 04/13/2019   SpO2 97%   BMI 27.44 kg/m    (Her prior subacute tachycardia seems to have resolved).  Skin:  No acute rash or lesion.  Right arm PICC line site lacks inflammation.  Head/Neck:  NCAT.  External auditory canals are patent without discharge.  PERRL.  EOMI.  Wears spectacles.  Conjunctivae are pink without injection.  Sclerae are white.  There is no anterior oral lesion visible.  Neck is supple without visible lymphadenopathy or thyromegaly.  Lungs:  Bilaterally clear to auscultation.  CV:  RRR.  Normal S1, S2.  Abdomen:  Bowel sounds active, soft, nontender.  Extremities:  Right leg is normal.  Left lateral ankle has a curved lateral incision line with a large-size band-aid overlying -- the dressing was removed to show a mid-posterior ~ 2 cm dehiscence with granulation tissue / fibrin / slight oozing serosanguinous purulence.  There is mild tenderness and slight edema surrounding the wound and extending distally over the mid-portion of the dorsal mid-foot.  There is no appreciable warmth or erythema at the wound.  The feet are otherwise distally warm without other edema.  Neuro:  Alert, oriented, memory/cognition/affect " are normal.  Gait is compromised by the left ankle tenderness and an overlying walking boot she says she has been wearing for several months.    LABORATORY STUDIES (Reviewed with the patient during her appointment today):      WBC 05/02/2019 4.6  4.0 - 11.0 10e9/L Final     RBC Count 05/02/2019 3.70* 3.8 - 5.2 10e12/L Final     Hemoglobin 05/02/2019 10.5* 11.7 - 15.7 g/dL Final     Hematocrit 05/02/2019 32.4* 35.0 - 47.0 % Final     MCV 05/02/2019 88  78 - 100 fl Final     MCH 05/02/2019 28.4  26.5 - 33.0 pg Final     MCHC 05/02/2019 32.4  31.5 - 36.5 g/dL Final     RDW 05/02/2019 12.7  10.0 - 15.0 % Final     Platelet Count 05/02/2019 205  150 - 450 10e9/L Final     Diff Method 05/02/2019 Automated Method   Final     % Neutrophils 05/02/2019 54.0  % Final     % Lymphocytes 05/02/2019 40.7  % Final     % Monocytes 05/02/2019 3.1  % Final     % Eosinophils 05/02/2019 1.8  % Final     % Basophils 05/02/2019 0.2  % Final     % Immature Granulocytes 05/02/2019 0.2  % Final     Nucleated RBCs 05/02/2019 0  0 /100 Final     Absolute Neutrophil 05/02/2019 2.5  1.6 - 8.3 10e9/L Final     Absolute Lymphocytes 05/02/2019 1.9  0.8 - 5.3 10e9/L Final     Absolute Monocytes 05/02/2019 0.1  0.0 - 1.3 10e9/L Final     Absolute Eosinophils 05/02/2019 0.1  0.0 - 0.7 10e9/L Final     Absolute Basophils 05/02/2019 0.0  0.0 - 0.2 10e9/L Final     Abs Immature Granulocytes 05/02/2019 0.0  0 - 0.4 10e9/L Final     Absolute Nucleated RBC 05/02/2019 0.0   Final     CRP Inflammation 05/02/2019 <2.9  0.0 - 8.0 mg/L Final     Sed Rate 05/02/2019 8  0 - 20 mm/h Final     Specimen Description 05/02/2019 Left Foot Wound   Final     Special Requests 05/02/2019 Specimen collected in eSwab transport (white cap)   Final     Culture Micro 05/02/2019 NGTD   Final     IMPRESSION/PLAN:  A 23-year-old woman with a history of Bechet's syndrome (on colchicine), fibromylagia, GERD and irritable bowel syndrome who returns to Infectious Disease Clinic today  for follow-up of a left ankle MSSA / coagulase negative Staph infection s/p a left ankle ankle arthroscopic synovectomy on 1/2/19 which subsequently developed persistent wound drainage and required  I&D on 3/12/19.  She completed six weeks of IV vancomycin / daptomcyin (switched 4/1/19 for JOSE JUAN) on 5/1/19, but was seen in the [facility] ER yesterday, 5/2/19 after the wound newly dehisced that morning.  She was given another dose of daptomycin in the ER yesterday PM. On exam, the wound does have a clear small zone of dehiscence and is slightly oozing serosanguinous, perhaps mildly-purulent, discharge.  The wound lacks other signs of inflammation except some chronic palpation / rotation tenderness (which she says is perhaps only a little increased from the past several weeks) and slight aamir-wound edema.  She lacks constitutional symptoms, other new symptoms, or systemic markers of new inflammation based on yesterday's lab work obtained in the emergency room (with an ongoing normal WBC, ESR, and CRP).  She has not recent left ankle x-rays or other imaging as yet, but there is concern that the dehiscence could possible represent an underlying osteomyelitis with a persistent sinus tract.  That would be unexpected from a joint that has just received six weeks of IV antibiotic therapy (although daptomycin is perhaps not a first line anti-MSSA agent) and lacks hardware, but osteomyelitis needs to be ruled out.  Other potential causes of the dehiscence are not clear, because she reports no recent ankle / foot trauma (and generally wears her protective boot).  The dehiscence seems to have been triggered by the warmth of a shower, but that in and of itself should not be a sufficient cause.    Today, we will check three view left ankle x-rays looking for any evidence of osteo.  Repeat labs are not necessary since they were obtained 24 hours ago in the ER, but we will continue to monitor the superficial swab culture obtained there --  so far, there is no growth.  She plans to return to see her [facility] podiatrist, Dr. Andres Johnson, early next week -- we will facilitate scheduling of that appointment -- for his assessment of the wound and the underlying tissues and bone.  In the meanwhile, to be safe, we will re-institute ongoing IV daptomycin therapy at the same daily dose as previous (~ 7 mg/kg/day) via her extant right arm PICC.  [facility] tells me they cannot get a dose to her this evening, but will start the renewed course tomorrow morning.  For now, we will plan to continue the IV daptomycin in a precautionary fashion through 5/23/19, when I have scheduled her to return to clinic for a follow-up appointment with me.  At that time, we will assess the evidence for an underlying osteomyelitis and decide if we should again discontinue all antibiotic therapy, continue daptomycin, cover her more broadly or with a first line beta-lactam antistaph antibiotic (after beta lactam testing or desensitization), or switch to a longer term step-down course of a bio-avaiable anti-staphylococcal oral antibiotic such as TMP-SMX or doxycycline (neither of which are listed as allergies even though she has list penicillin / cephalosporin skin allergies, and both of which her MSSA isolate was susceptible to).  Ms. Oropeza and her partner seemed satisfied with this plan and I believe I answered all their questions to the extent possible.  They will contact our clinic if additional questions or concerns arise between now and 5/23/19.  (I will be out-of-town during much of that time frame and will be covered by ID colleagues.)    Of note, her prior JOSE JUAN had normalized to a creatinine of 0.78 as of 4/30/19 and her CPKs have been consistently normal on daptomycin (most recently 97 on 4/30/19).      Blayne Alexandre MD

## 2019-05-04 ENCOUNTER — HOSPITAL ENCOUNTER (EMERGENCY)
Facility: CLINIC | Age: 24
Discharge: HOME | End: 2019-05-04
Attending: EMERGENCY MEDICINE | Admitting: EMERGENCY MEDICINE
Payer: COMMERCIAL

## 2019-05-04 VITALS
WEIGHT: 154 LBS | OXYGEN SATURATION: 100 % | BODY MASS INDEX: 27.28 KG/M2 | SYSTOLIC BLOOD PRESSURE: 126 MMHG | HEART RATE: 80 BPM | RESPIRATION RATE: 16 BRPM | TEMPERATURE: 98.3 F | DIASTOLIC BLOOD PRESSURE: 87 MMHG

## 2019-05-04 DIAGNOSIS — Z78.9 PROBLEM WITH VASCULAR ACCESS: ICD-10-CM

## 2019-05-04 LAB
BACTERIA SPEC CULT: NO GROWTH
Lab: NORMAL
SPECIMEN SOURCE: NORMAL

## 2019-05-04 PROCEDURE — 25000128 H RX IP 250 OP 636: Performed by: EMERGENCY MEDICINE

## 2019-05-04 PROCEDURE — 25800030 ZZH RX IP 258 OP 636: Performed by: EMERGENCY MEDICINE

## 2019-05-04 PROCEDURE — 99284 EMERGENCY DEPT VISIT MOD MDM: CPT | Mod: 25 | Performed by: EMERGENCY MEDICINE

## 2019-05-04 PROCEDURE — 99284 EMERGENCY DEPT VISIT MOD MDM: CPT | Mod: Z6 | Performed by: EMERGENCY MEDICINE

## 2019-05-04 PROCEDURE — 96366 THER/PROPH/DIAG IV INF ADDON: CPT | Performed by: EMERGENCY MEDICINE

## 2019-05-04 PROCEDURE — 96365 THER/PROPH/DIAG IV INF INIT: CPT | Performed by: EMERGENCY MEDICINE

## 2019-05-04 PROCEDURE — 25000131 ZZH RX MED GY IP 250 OP 636 PS 637: Performed by: EMERGENCY MEDICINE

## 2019-05-04 RX ORDER — DAPTOMYCIN 500 MG/10ML
400 INJECTION, POWDER, LYOPHILIZED, FOR SOLUTION INTRAVENOUS ONCE
Status: DISCONTINUED | OUTPATIENT
Start: 2019-05-04 | End: 2019-05-04

## 2019-05-04 RX ORDER — ONDANSETRON 4 MG/1
4 TABLET, ORALLY DISINTEGRATING ORAL ONCE
Status: COMPLETED | OUTPATIENT
Start: 2019-05-04 | End: 2019-05-04

## 2019-05-04 RX ADMIN — ONDANSETRON 4 MG: 4 TABLET, ORALLY DISINTEGRATING ORAL at 16:29

## 2019-05-04 RX ADMIN — DAPTOMYCIN 400 MG: 500 INJECTION, POWDER, LYOPHILIZED, FOR SOLUTION INTRAVENOUS at 16:38

## 2019-05-04 RX ADMIN — SODIUM CHLORIDE 1000 ML: 9 INJECTION, SOLUTION INTRAVENOUS at 16:31

## 2019-05-04 NOTE — ED PROVIDER NOTES
"  History     Chief Complaint   Patient presents with     Vascular Access Problem     picc line came out today     HPI  Samara Oropeza is a 23 year old female with a history of Tourette's, seizures, and Bechet's syndrome s/p left ankle fusion (1/2/19) & resultant hardware removal (3/12/19) c/b infection with vancomycin treatment. However, treatment was c/b renal injury, and patient was then switched to Daptomycin through PICC line. Of note, she was seen here on 5/2/19 due to opening of her left ankle wound. Per chart review, patient had had an unremarkable work-up, was given a dose of daptomycin, and instructed to follow-up in her ID clinic.     Patient returns today with her mother for evaluation of her PICC line. Patient states her PICC line had fallen out this morning after taking a shower. Her home nurse had attempted to replace the line, was unsuccessful, and urged the patient to come here for evaluation. The patient states she receives daptomycin through her PICC daily. She notes she had missed her yesterday dose as it was delivered late and has not had her dose today either. Patient has 3 weeks of antibiotics left. Currently, she reports pain and swelling in her right bicep where the PICC was originally placed, though notes the swelling has improved somewhat since. She also states her ankle incision has been leaking a \"chunky white discharge\" today. Denies chest pain, shortness of breath, numbness or tingling in her right hand. She also has chronic nausea and left foot numbness, though both are unchanged.     Past Medical History:   Diagnosis Date     Anemia      Anxiety      Arthritis      Behcet's disease (H)      Cervical adenitis May 2010     Chronic abdominal pain      Constipation, chronic 1994     Fibromyalgia      Gastro-oesophageal reflux disease      Gastroparesis      Irregular heart beat     tachycardia, has had workup     Migraines      Neuromuscular disorder (H)     fibramyalgia     " Palpitations      Seizure (H)      Seizures (H)     unknown etiology     Syncope      Tourette's        Past Surgical History:   Procedure Laterality Date     ARTHROSCOPY ANKLE, REPAIR LIGAMENT Left 1/2/2019    Procedure: Ankle arthroscopy and sinus tarsi evacuation, ligament repair, left lower extremity;  Surgeon: Andres Johnson DPM;  Location: [facility] OR     ARTHROSCOPY KNEE WITH PATELLAR REALIGNMENT  7/25/2013    Procedure: ARTHROSCOPY KNEE WITH PATELLAR REALIGNMENT;  Left Knee Arthroscopy, Medial Patellofemoral Ligament Reconstruction with Allograft  ;  Surgeon: Jennifer Acevedo MD;  Location: [facility]     COLONOSCOPY  2015     DENTAL SURGERY  1996    Teeth removal     ENDOSCOPY UPPER, COLONOSCOPY, COMBINED  2005     HC ESOPH/GAS REFLUX TEST W NASAL IMPED >1 HR N/A 2/15/2017    Procedure: ESOPHAGEAL IMPEDENCE FUNCTION TEST WITH 24 HOUR PH GREATER THAN 1 HOUR;  Surgeon: Timothy Matta MD;  Location: [facility]     IR PICC PLACEMENT > 5 YRS OF AGE  3/13/2019     IRRIGATION AND DEBRIDEMENT FOOT, COMBINED Left 3/12/2019    Procedure: COMBINED IRRIGATION AND DEBRIDEMENT LEFT ANKLE;  Surgeon: Micha Glover MD;  Location: [facility]       Family History   Problem Relation Age of Onset     Depression Mother      Neurologic Disorder Mother         Migraines, take imitrex injection.  Also in maternal grandmother.       Alcohol/Drug Father      Hypertension Father      Depression Father      Osteoarthritis Father      Cardiovascular Maternal Grandmother      Depression Maternal Grandmother      Hypertension Maternal Grandmother      Alzheimer Disease Maternal Grandmother      Cardiovascular Maternal Grandfather      Hypertension Maternal Grandfather      Depression Maternal Grandfather      Alcohol/Drug Maternal Grandfather      Cardiovascular Paternal Grandmother      Hypertension Paternal Grandmother      Cardiovascular Paternal Grandfather      Hypertension Paternal Grandfather      Glaucoma No family hx  of      Macular Degeneration No family hx of        Social History     Tobacco Use     Smoking status: Never Smoker     Smokeless tobacco: Never Used   Substance Use Topics     Alcohol use: Yes     Alcohol/week: 0.6 oz     Types: 1 Standard drinks or equivalent per week     Comment: rarely       Current Facility-Administered Medications   Medication     heparin lock flush 10 UNIT/ML injection 5 mL     Current Outpatient Medications   Medication     albuterol (PROAIR HFA/PROVENTIL HFA/VENTOLIN HFA) 108 (90 BASE) MCG/ACT Inhaler     amitriptyline (ELAVIL) 25 MG tablet     artificial tears OINT ophthalmic ointment     aspirin (ASPIRIN CHILDRENS) 81 MG chewable tablet     benzocaine (TOPICALE XTRA) 20 % GEL     betamethasone valerate (VALISONE) 0.1 % cream     bisacodyl (DULCOLAX) 5 MG EC tablet     citalopram (CELEXA) 10 MG tablet     clobetasol (TEMOVATE) 0.05 % cream     colchicine (COLCYRS) 0.6 MG tablet     cyproheptadine (PERIACTIN) 4 MG tablet     DAPTOmycin 350 MG SOLR     dicyclomine (BENTYL) 20 MG tablet     diphenhydrAMINE (BENADRYL) 25 MG tablet     EPINEPHrine (EPIPEN 2-EAMON) 0.3 MG/0.3ML injection     gabapentin (NEURONTIN) 100 MG capsule     guanFACINE (TENEX) 1 MG tablet     hydrocortisone 1 % CREA cream     hyoscyamine (ANASPAZ/LEVSIN) 0.125 MG tablet     hypromellose (GENTEAL) 0.3 % SOLN     lactulose 20 GM/30ML SOLN     lidocaine (LMX4) 4 % external cream     linaclotide (LINZESS) 290 MCG capsule     LORazepam (ATIVAN) 0.5 MG tablet     Magic Mouthwash (FV std formula) lidocaine visc 2% 2.5mL/5mL & maalox/mylanta w/ simeth 2.5mL/5mL & diphenhydrAMINE 5mg/5mL     nitroFURantoin macrocrystal-monohydrate (MACROBID) 100 MG capsule     norgestimate-ethinyl estradiol (ORTHO-CYCLEN/SPRINTEC) 0.25-35 MG-MCG tablet     omeprazole (PRILOSEC) 40 MG capsule     ondansetron (ZOFRAN-ODT) 8 MG ODT tab     order for DME     order for DME     order for DME     order for DME     polyethylene glycol (MIRALAX/GLYCOLAX)  powder     sennosides (SENOKOT) 8.6 MG tablet     sodium chloride 0.9% SOLN BOLUS     SPRINTEC 28 0.25-35 MG-MCG tablet     sucralfate (CARAFATE) 1 GM/10ML suspension        Allergies   Allergen Reactions     Amoxil [Penicillins] Rash     Dad unsure of reaction.     Bee Venom Anaphylaxis     Contrast Dye Rash     Contrast Media Ready-[facility] 04[place]09[place]2014.; Contrast Media Ready-[facility] 04/09/2014.  NOTE: this is a contrast media oral with iodine. Premedicate with methylpred standard for IV contrast, request barium contrast for oral contrast.     Orange Fruit [Citrus] Anaphylaxis     Pineapple Anaphylaxis, Difficulty breathing and Rash     Reglan [Metoclopramide] Other (See Comments)     IV dose only, in ER, rapid heart rate.     Ace Inhibitors      Difficulty in breathing and GI upset     Amitiza [Lubiprostone] Nausea and Vomiting     Amoxicillin-Pot Clavulanate      Midazolam Unknown     Other reaction(s): Unknown  parent states that when pt takes this medication, she wakes up being very violent .  parent states that when pt takes this medication, she wakes up being very violent .     No Clinical Screening - See Comments      Bleech/ chest tightness, itchy throat, swollen tongue, hives     Tizanidine Other (See Comments)     Confusion, back pain, photophobia, abdominal pain, shaking, anxious       Versed      Coming out of pelvic exam at age of 5, was kicking and screaming when coming out of the versed.     Adhesive Tape Rash     Azithromycin Hives and Rash     Cephalexin Itching and Rash     Itchy mouth  Itchy mouth     Keflex [Cephalexin-Fd&C Yellow #6] Hives     I have reviewed the Medications, Allergies, Past Medical and Surgical History, and Social History in the Epic system.    Review of Systems   A complete ROS was completed with pertinent positives and negatives noted in HPI; otherwise negative.     Physical Exam   BP: (P) 125/88  Pulse: (P) 87  Temp: (P) 97.3  F (36.3  C)  Resp: (P) 16  Weight: (P) 69.9  kg (154 lb)  SpO2: (P) 99 %    Physical Exam     Gen: Alert, oriented. Non-toxic appearing.   HEENT: Normocephalic. Conjunctivae without injection. No scleral icterus.   CV: RRR, no clear murmur appreciated  Respiratory: Non-labored breathing on RA. No wheezing or stridor appreciated.   Abdomen/GI: Soft, non-tender. Non-distended. No rebound tenderness appreciated.   : No CVA tenderness.   MSK: RUQ with single nicolas from PICC line site. No TTP or surrounding erythema. ROM of shoulder, elbow and wrist intact. No gross bony deformity. LLQ in the boot. Incision with mild erythema over medial aspect of ankle. Unable to express any discharge from the wound. No tracking erythema up the leg.   Heme/lymph: No ecchymoses noted.   Neuro: Alert, oriented. CN 2-12 grossly intact.   Psych: No delusions or agitation.     ED Course        Procedures       Labs Ordered and Resulted from Time of ED Arrival Up to the Time of Departure from the ED - No data to display         Assessments & Plan (with Medical Decision Making)   Samara Oropeza is a 23 year old F with medical history significant for Bechet's syndrome with complicated course after left ankle surgery presented for evaluation of vascular access issue.  Differential considered included infection at PICC line site, left ankle infection, medication reaction or other.  Reviewed nursing notes patient's vitals on arrival.  Is notable for normal blood pressure, normal SPO2 and heart rate.  She is afebrile.  On exam she is nontoxic-appearing with PICC line site with no evidence of erythema or edema to suggest infection or clear evidence of clot.  She is otherwise normal range of motion and no paresthesias in the right arm to suggest any other additional complications.  Her wound over the left ankle appears stable.  I did speak with patient's infectious disease doctor Dr. Arreola who is been following the wound and recommended giving dose of daptomycin here.  We will ask her to  follow up tomorrow in the [place] emergency department where she may be able to get a PICC line placed during the day for ongoing antibiotics.  Patient reported her nausea improved after Zofran administration. Daptomycin and NS bolus given.     Reviewed and return precautions with her and her parent.  Patient was discharged in stable condition. Will return tomorrow to ED.     Final diagnoses:   Problem with vascular access   IAkhil, am serving as a trained medical scribe to document services personally performed by Nicola Samson MD, based on the provider's statements to me.   Nicola ROJO MD, was physically present and have reviewed and verified the accuracy of this note documented by Akhil Lindsey.    5/4/2019   [facility], [place], EMERGENCY DEPARTMENT     Nicola Samson MD  05/04/19 [id number]

## 2019-05-04 NOTE — ED AVS SNAPSHOT
[facility], [place], Emergency Department  [address]  [facility] [address]  Phone:  [phone]  Fax:  [phone]                                    Samara Oropeza   MRN: [id number]    Department:  [facility], Emergency Department   Date of Visit:  5/4/2019           After Visit Summary Signature Page    I have received my discharge instructions, and my questions have been answered. I have discussed any challenges I see with this plan with the nurse or doctor.    ..........................................................................................................................................  Patient/Patient Representative Signature      ..........................................................................................................................................  Patient Representative Print Name and Relationship to Patient    ..................................................               ................................................  Date                                   Time    ..........................................................................................................................................  Reviewed by Signature/Title    ...................................................              ..............................................  Date                                               Time          22EPIC Rev 08/18

## 2019-05-04 NOTE — PROGRESS NOTES
Division of Infectious Diseases and International Medicine  Department of Medicine        INFECTIOUS DISEASE CLINIC OUTPATIENT VISIT NOTE [facility] [address]  [address] [place] [address]  Office: [phone]  Fax:  [phone]     HISTORY OF PRESENT ILLNESS:  Ms. Samara Oropeza is a 23-year-old woman with a history of Bechet's syndrome (on colchicine), fibromylagia, GERD and irritable bowel syndrome who returns to Infectious Disease Clinic today for follow-up of a left ankle MSSA / coagulase negative Staph infection s/p a left ankle ankle arthroscopic synovectomy on 1/2/19 which subsequently developed persistent wound drainage.  She is accompanied to clinic today by her long-term boyfriend.   She was most recently previously seen in Infectious Disease Clinic by Dr. Mehta on 4/23/19 and more recently was seen in the [facility] emergency room yesterday, 5/2/19.  Her left ankle post-operative infection had required I&D on 3/12/19 (with the cultures growing MSSA plus also coagulase negative Staph thought to be a possible contamint) and she had since been on home IV antibiotic therapy via [facility] -- first with IV vancomycin (due to a past history of rash / urticaria on penicillins / cephalosporins per her father), then with IV daptomycin (due to JOSE JUAN necessitating the switch on 4/1/19) -- until 5/1/19.  She has no ongoing hardware in her ankle -- that had been placed in ~ 7/13 but was removed on 3/12/19 (per the patient).  When last seen in ID Clinic on 4/23/19, she still has some mild-moderate left inferior leg discomfort and mild lateral malleolar region palpation and ankle rotation tenderness, but was otherwise feeling well (except for a URI from ~ 4/21 - 4/26/19 shared by her boyfriend and now resolved for about a week).  Her ankle incision was closed and lacked inflammation at that time.  Thus, Dr. Mehta continued the plan of completing the six week course of vancomycin/daptomycin  "on 5/1/19.  The right arm PICC line has not yet been removed, however -- that was to be done later yesterday.  Just after showering yesterday morning while cleaning the incision site, however, the mid-posterior portion of her left ankle incision opened and began draining \"dark fluid with sometimes red or white chunky spots\".  She was unable to be seen in the clinic of her [facility] podiatrist, Dr. Andres Johnson, and was offered an ID Clinic appointment for this morning which Ms. Oropeza was unable to attend.  There was also a scheduling snafu with an afternoon ID Clinic appointment yesterday so she was unable to be seen.  Thus, she was referred to the [facility] emergency room yesterday late afternoon where a small mid-incisional dehiscence was noted with slight drainage and ongoing tenderness but no superficial visible inflammation.  A picture was taken in the ER and is in her chart.  A superficial swab culture was obtained at the open spot of the wound and has so far not yielded any growth.  She was given a dose of IV daptomycin and referred to ID Clinic for early follow-up which was re-scheduled with me for this afternoon.  She says that her ankle feels unchanged from the past week or two in terms of tenderness although she thinks the lateral malleolar region is a bit more puffy and the wound has continued to ooze serosanguinous, somewhat \"chunky\" fluid today.  There is a small ~ 2 cm spot of dried drainage on the bandage that was placed several hours ago.  Beyond the ankle, she otherwise continues to feel well, without recent fever, chills, sweats, anorexia, unusual fatigue, rash elsewhere, new myalgias/arthralgias elsewhere, cough, dyspnea, new EENT symptoms, chest pain, nausea, unusual diarrhea, abdominal pain, genitourinary symptoms, unusual headache or other new neurological symptoms.  Her right arm PICC line (placed 3/12/19) had some difficulty with infusion flow until several weeks ago but " "was treated with \"heparin\" by [facility] and has since functioned well.  The PICC has some aamir-insertion chronic tenderness to palpation and movement, but no other signs of inflammation.  She tolerated IV daptomycin without difficulty (except for some odd bilateral hand numbness that she attributed to the daptomycin for the first two or so weeks but spontanoeusly resolved for the final two+ weeks of her daptomycin course while she was still on it).    PAST MEDICAL HISTORY:  Past Medical History:   Diagnosis Date     Anemia      Anxiety      Arthritis      Behcet's disease (H)      Cervical adenitis May 2010     Chronic abdominal pain      Constipation, chronic 1994     Fibromyalgia      Gastro-oesophageal reflux disease      Gastroparesis      Irregular heart beat     tachycardia, has had workup     Migraines      Neuromuscular disorder (H)     fibramyalgia     Palpitations      Seizure (H)      Seizures (H)     unknown etiology     Syncope      Tourette's      Past Surgical History:   Procedure Laterality Date     ARTHROSCOPY ANKLE, REPAIR LIGAMENT Left 1/2/2019    Procedure: Ankle arthroscopy and sinus tarsi evacuation, ligament repair, left lower extremity;  Surgeon: Andres Johnson DPM;  Location:  OR     ARTHROSCOPY KNEE WITH PATELLAR REALIGNMENT  7/25/2013    Procedure: ARTHROSCOPY KNEE WITH PATELLAR REALIGNMENT;  Left Knee Arthroscopy, Medial Patellofemoral Ligament Reconstruction with Allograft  ;  Surgeon: Jennifer Acevedo MD;  Location: [facility] OR     COLONOSCOPY  2015     DENTAL SURGERY  1996    Teeth removal     ENDOSCOPY UPPER, COLONOSCOPY, COMBINED  2005      ESOPH/GAS REFLUX TEST W NASAL IMPED >1 HR N/A 2/15/2017    Procedure: ESOPHAGEAL IMPEDENCE FUNCTION TEST WITH 24 HOUR PH GREATER THAN 1 HOUR;  Surgeon: Timothy Matta MD;  Location: [facility] GI     IR PICC PLACEMENT > 5 YRS OF AGE  3/13/2019     IRRIGATION AND DEBRIDEMENT FOOT, COMBINED Left 3/12/2019    Procedure: COMBINED IRRIGATION AND " DEBRIDEMENT LEFT ANKLE;  Surgeon: Micha Glover MD;  Location: [facility] OR     ALLERGIES:  Allergies   Allergen Reactions     Amoxil [Penicillins] Rash     Dad unsure of reaction.     Bee Venom Anaphylaxis     Contrast Dye Rash     Contrast Media Ready-Box [facility], 04/09/2014.; Contrast Media Ready-Box [facility], 04/09/2014.  NOTE: this is a contrast media oral with iodine. Premedicate with methylpred standard for IV contrast, request barium contrast for oral contrast.     Orange Fruit [Citrus] Anaphylaxis     Pineapple Anaphylaxis, Difficulty breathing and Rash     Reglan [Metoclopramide] Other (See Comments)     IV dose only, in ER, rapid heart rate.     Ace Inhibitors      Difficulty in breathing and GI upset     Amitiza [Lubiprostone] Nausea and Vomiting     Amoxicillin-Pot Clavulanate      Midazolam Unknown     Other reaction(s): Unknown  parent states that when pt takes this medication, she wakes up being very violent .  parent states that when pt takes this medication, she wakes up being very violent .     No Clinical Screening - See Comments      Bleech/ chest tightness, itchy throat, swollen tongue, hives     Tizanidine Other (See Comments)     Confusion, back pain, photophobia, abdominal pain, shaking, anxious       Versed      Coming out of pelvic exam at age of 5, was kicking and screaming when coming out of the versed.     Adhesive Tape Rash     Azithromycin Hives and Rash     Cephalexin Itching and Rash     Itchy mouth  Itchy mouth     Keflex [Cephalexin-Fd&C Yellow #6] Hives     MEDICATIONS:  Current Outpatient Medications   Medication Sig Dispense Refill     albuterol (PROAIR HFA/PROVENTIL HFA/VENTOLIN HFA) 108 (90 BASE) MCG/ACT Inhaler Inhale 2 puffs into the lungs every 6 hours as needed for shortness of breath / dyspnea or wheezing 1 Inhaler 1     amitriptyline (ELAVIL) 25 MG tablet Take 1 tablet (25 mg) by mouth At Bedtime 30 tablet 1     artificial tears OINT ophthalmic ointment 0.5 inch strip  each eye at night 1 Tube 11     aspirin (ASPIRIN CHILDRENS) 81 MG chewable tablet Take 81 mg by mouth as needed        benzocaine (TOPICALE XTRA) 20 % GEL Apply as needed locally to mouth or nasal ulcers for pain; 4 times daily as needed 30 g 1     betamethasone valerate (VALISONE) 0.1 % cream Apply topically 2 times daily       bisacodyl (DULCOLAX) 5 MG EC tablet Take 2 tablets (10 mg) by mouth daily as needed for constipation 30 tablet 0     citalopram (CELEXA) 10 MG tablet Take 1 tablet (10 mg) by mouth daily 30 tablet 1     clobetasol (TEMOVATE) 0.05 % cream Apply topically 2 times daily 60 g 0     colchicine (COLCYRS) 0.6 MG tablet Take 0.5 tablets (0.3 mg) by mouth daily       cyproheptadine (PERIACTIN) 4 MG tablet Take 1 tablet (4 mg) by mouth At Bedtime For nightmares 30 tablet 2     DAPTOmycin 350 MG SOLR Inject 400 mg into the vein daily 19 each 0     dicyclomine (BENTYL) 20 MG tablet Take 1 tablet (20 mg) by mouth 4 times daily as needed 120 tablet 3     diphenhydrAMINE (BENADRYL) 25 MG tablet Take 1 tablet (25 mg) by mouth 3 times daily as needed (itching) 40 tablet 1     EPINEPHrine (EPIPEN 2-EAMON) 0.3 MG/0.3ML injection Inject 0.3 mLs (0.3 mg) into the muscle as needed for anaphylaxis 0.6 mL 3     gabapentin (NEURONTIN) 100 MG capsule Take 1 capsule (100 mg) by mouth 3 times daily as needed (anxiety) 90 capsule 1     guanFACINE (TENEX) 1 MG tablet Take 3 tablets (3 mg) by mouth twice daily in the morning and evening. 180 tablet 3     hydrocortisone 1 % CREA cream Place rectally 2 times daily as needed for itching 28.35 g 1     hyoscyamine (ANASPAZ/LEVSIN) 0.125 MG tablet Take 1-2 tablets (125-250 mcg) by mouth every 4 hours as needed for cramping 40 tablet 1     hypromellose (GENTEAL) 0.3 % SOLN 1 drop every hour as needed for dry eyes        lactulose 20 GM/30ML SOLN Take 30 mLs by mouth 3 times daily as needed (for constipation) 300 mL 3     lidocaine (LMX4) 4 % external cream Apply topically once as  needed for mild pain       linaclotide (LINZESS) 290 MCG capsule Take 1 capsule (290 mcg) by mouth every morning (before breakfast) 90 capsule 3     LORazepam (ATIVAN) 0.5 MG tablet Take 1 tablet (0.5 mg) by mouth every 6 hours as needed for anxiety 10 tablet 0     Magic Mouthwash (FV std formula) lidocaine visc 2% 2.5mL/5mL & maalox/mylanta w/ simeth 2.5mL/5mL & diphenhydrAMINE 5mg/5mL Swish and swallow 10 mLs in mouth every 6 hours as needed for mouth sores 1 Bottle 1     nitroFURantoin macrocrystal-monohydrate (MACROBID) 100 MG capsule Take 1 capsule (100 mg) by mouth At Bedtime 90 capsule 1     norgestimate-ethinyl estradiol (ORTHO-CYCLEN/SPRINTEC) 0.25-35 MG-MCG tablet Take 1 tablet by mouth daily 84 tablet 4     omeprazole (PRILOSEC) 40 MG capsule Take 1 capsule (40 mg) by mouth daily 90 capsule 3     ondansetron (ZOFRAN-ODT) 8 MG ODT tab Take 1 tablet (8 mg) by mouth every 8 hours as needed for nausea 90 tablet 1     order for DME Roll-A-Bout Walker. Patient can use for 3 months 1 Units 0     order for DME Equipment being ordered: size 8 1/2 walking boot tall 1 Device 0     order for DME Equipment being ordered: RollAbout knee scooter 1 Device 0     order for DME Equipment being ordered: adult pair of crutches 1 Device 0     polyethylene glycol (MIRALAX/GLYCOLAX) powder Take 1 capful by mouth 3 times daily       sennosides (SENOKOT) 8.6 MG tablet Take 3 tablets by mouth 2 times daily 240 tablet 3     sodium chloride 0.9% SOLN BOLUS Inject 1,000 mLs into the vein daily as needed (IV abx and flushes) 1000 mL 11     SPRINTEC 28 0.25-35 MG-MCG tablet Take one tablet by mouth daily. Take continuously. 84 tablet 1     sucralfate (CARAFATE) 1 GM/10ML suspension Take 10 mLs (1 g) by mouth 4 times daily 1200 mL 2     SOCIAL HISTORY:  Social History     Socioeconomic History     Marital status: Single     Spouse name: Not on file     Number of children: Not on file     Years of education: Not on file     Highest  "education level: Not on file   Occupational History     Not on file   Social Needs     Financial resource strain: Not on file     Food insecurity:     Worry: Not on file     Inability: Not on file     Transportation needs:     Medical: Not on file     Non-medical: Not on file   Tobacco Use     Smoking status: Never Smoker     Smokeless tobacco: Never Used   Substance and Sexual Activity     Alcohol use: Yes     Alcohol/week: 0.6 oz     Types: 1 Standard drinks or equivalent per week     Comment: rarely     Drug use: No     Types: Marijuana     Comment: occassional marijuana only, denies others     Sexual activity: Not Currently     Partners: Male     Birth control/protection: Pill   Lifestyle     Physical activity:     Days per week: Not on file     Minutes per session: Not on file     Stress: Not on file   Relationships     Social connections:     Talks on phone: Not on file     Gets together: Not on file     Attends Mosque service: Not on file     Active member of club or organization: Not on file     Attends meetings of clubs or organizations: Not on file     Relationship status: Not on file     Intimate partner violence:     Fear of current or ex partner: Not on file     Emotionally abused: Not on file     Physically abused: Not on file     Forced sexual activity: Not on file   Other Topics Concern     Parent/sibling w/ CABG, MI or angioplasty before 65F 55M? Not Asked   Social History Narrative    Boyfriend of 5 years, living with \"in laws\" Oct 2017 and looking forward to their own apartment.      FAMILY HISTORY:  Family History   Problem Relation Age of Onset     Depression Mother      Neurologic Disorder Mother         Migraines, take imitrex injection.  Also in maternal grandmother.       Alcohol/Drug Father      Hypertension Father      Depression Father      Osteoarthritis Father      Cardiovascular Maternal Grandmother      Depression Maternal Grandmother      Hypertension Maternal Grandmother      " "Alzheimer Disease Maternal Grandmother      Cardiovascular Maternal Grandfather      Hypertension Maternal Grandfather      Depression Maternal Grandfather      Alcohol/Drug Maternal Grandfather      Cardiovascular Paternal Grandmother      Hypertension Paternal Grandmother      Cardiovascular Paternal Grandfather      Hypertension Paternal Grandfather      Glaucoma No family hx of      Macular Degeneration No family hx of      REVIEW OF SYSTEMS:  As per HPI.  Complete ROS is otherwise negative.    PHYSICAL EXAMINATION:  General:  A pleasant, conversant, WDWN, 23-year-old woman in NAD.  Vital signs:  /86   Pulse 99   Temp 98.4  F (36.9  C) (Oral)   Ht 1.6 m (5' 3\")   Wt 70.3 kg (154 lb 14.4 oz)   LMP 04/13/2019   SpO2 97%   BMI 27.44 kg/m   (Her prior subacute tachycardia seems to have resolved).  Skin:  No acute rash or lesion.  Right arm PICC line site lacks inflammation.  Head/Neck:  NCAT.  External auditory canals are patent without discharge.  PERRL.  EOMI.  Wears spectacles.  Conjunctivae are pink without injection.  Sclerae are white.  There is no anterior oral lesion visible.  Neck is supple without visible lymphadenopathy or thyromegaly.  Lungs:  Bilaterally clear to auscultation.  CV:  RRR.  Normal S1, S2.  Abdomen:  Bowel sounds active, soft, nontender.  Extremities:  Right leg is normal.  Left lateral ankle has a curved lateral incision line with a large-size band-aid overlying -- the dressing was removed to show a mid-posterior ~ 2 cm dehiscence with granulation tissue / fibrin / slight oozing serosanguinous purulence.  There is mild tenderness and slight edema surrounding the wound and extending distally over the mid-portion of the dorsal mid-foot.  There is no appreciable warmth or erythema at the wound.  The feet are otherwise distally warm without other edema.  Neuro:  Alert, oriented, memory/cognition/affect are normal.  Gait is compromised by the left ankle tenderness and an overlying " walking boot she says she has been wearing for several months.    LABORATORY STUDIES (Reviewed with the patient during her appointment today):      WBC 05/02/2019 4.6  4.0 - 11.0 10e9/L Final     RBC Count 05/02/2019 3.70* 3.8 - 5.2 10e12/L Final     Hemoglobin 05/02/2019 10.5* 11.7 - 15.7 g/dL Final     Hematocrit 05/02/2019 32.4* 35.0 - 47.0 % Final     MCV 05/02/2019 88  78 - 100 fl Final     MCH 05/02/2019 28.4  26.5 - 33.0 pg Final     MCHC 05/02/2019 32.4  31.5 - 36.5 g/dL Final     RDW 05/02/2019 12.7  10.0 - 15.0 % Final     Platelet Count 05/02/2019 205  150 - 450 10e9/L Final     Diff Method 05/02/2019 Automated Method   Final     % Neutrophils 05/02/2019 54.0  % Final     % Lymphocytes 05/02/2019 40.7  % Final     % Monocytes 05/02/2019 3.1  % Final     % Eosinophils 05/02/2019 1.8  % Final     % Basophils 05/02/2019 0.2  % Final     % Immature Granulocytes 05/02/2019 0.2  % Final     Nucleated RBCs 05/02/2019 0  0 /100 Final     Absolute Neutrophil 05/02/2019 2.5  1.6 - 8.3 10e9/L Final     Absolute Lymphocytes 05/02/2019 1.9  0.8 - 5.3 10e9/L Final     Absolute Monocytes 05/02/2019 0.1  0.0 - 1.3 10e9/L Final     Absolute Eosinophils 05/02/2019 0.1  0.0 - 0.7 10e9/L Final     Absolute Basophils 05/02/2019 0.0  0.0 - 0.2 10e9/L Final     Abs Immature Granulocytes 05/02/2019 0.0  0 - 0.4 10e9/L Final     Absolute Nucleated RBC 05/02/2019 0.0   Final     CRP Inflammation 05/02/2019 <2.9  0.0 - 8.0 mg/L Final     Sed Rate 05/02/2019 8  0 - 20 mm/h Final     Specimen Description 05/02/2019 Left Foot Wound   Final     Special Requests 05/02/2019 Specimen collected in eSwab transport (white cap)   Final     Culture Micro 05/02/2019 NGTD   Final     IMPRESSION/PLAN:  A 23-year-old woman with a history of Bechet's syndrome (on colchicine), fibromylagia, GERD and irritable bowel syndrome who returns to Infectious Disease Clinic today for follow-up of a left ankle MSSA / coagulase negative Staph infection s/p a  left ankle ankle arthroscopic synovectomy on 1/2/19 which subsequently developed persistent wound drainage and required  I&D on 3/12/19.  She completed six weeks of IV vancomycin / daptomcyin (switched 4/1/19 for JOSE JUAN) on 5/1/19, but was seen in the [facility] ER yesterday, 5/2/19 after the wound newly dehisced that morning.  She was given another dose of daptomycin in the ER yesterday PM. On exam, the wound does have a clear small zone of dehiscence and is slightly oozing serosanguinous, perhaps mildly-purulent, discharge.  The wound lacks other signs of inflammation except some chronic palpation / rotation tenderness (which she says is perhaps only a little increased from the past several weeks) and slight aamir-wound edema.  She lacks constitutional symptoms, other new symptoms, or systemic markers of new inflammation based on yesterday's lab work obtained in the emergency room (with an ongoing normal WBC, ESR, and CRP).  She has not recent left ankle x-rays or other imaging as yet, but there is concern that the dehiscence could possible represent an underlying osteomyelitis with a persistent sinus tract.  That would be unexpected from a joint that has just received six weeks of IV antibiotic therapy (although daptomycin is perhaps not a first line anti-MSSA agent) and lacks hardware, but osteomyelitis needs to be ruled out.  Other potential causes of the dehiscence are not clear, because she reports no recent ankle / foot trauma (and generally wears her protective boot).  The dehiscence seems to have been triggered by the warmth of a shower, but that in and of itself should not be a sufficient cause.    Today, we will check three view left ankle x-rays looking for any evidence of osteo.  Repeat labs are not necessary since they were obtained 24 hours ago in the ER, but we will continue to monitor the superficial swab culture obtained there -- so far, there is no growth.  She plans to return to see her [facility]  [place] podiatrist, Dr. Andres Johnson, early next week -- we will facilitate scheduling of that appointment -- for his assessment of the wound and the underlying tissues and bone.  In the meanwhile, to be safe, we will re-institute ongoing IV daptomycin therapy at the same daily dose as previous (~ 7 mg/kg/day) via her extant right arm PICC.  [facility] tells me they cannot get a dose to her this evening, but will start the renewed course tomorrow morning.  For now, we will plan to continue the IV daptomycin in a precautionary fashion through 5/23/19, when I have scheduled her to return to clinic for a follow-up appointment with me.  At that time, we will assess the evidence for an underlying osteomyelitis and decide if we should again discontinue all antibiotic therapy, continue daptomycin, cover her more broadly or with a first line beta-lactam antistaph antibiotic (after beta lactam testing or desensitization), or switch to a longer term step-down course of a bio-avaiable anti-staphylococcal oral antibiotic such as TMP-SMX or doxycycline (neither of which are listed as allergies even though she has list penicillin / cephalosporin skin allergies, and both of which her MSSA isolate was susceptible to).  Ms. Oropeza and her partner seemed satisfied with this plan and I believe I answered all their questions to the extent possible.  They will contact our clinic if additional questions or concerns arise between now and 5/23/19.  (I will be out-of-town during much of that time frame and will be covered by ID colleagues.)    Of note, her prior JOSE JUAN had normalized to a creatinine of 0.78 as of 4/30/19 and her CPKs have been consistently normal on daptomycin (most recently 97 on 4/30/19).

## 2019-05-04 NOTE — ED NOTES
IV antibiotics infusing, denies pain at site. Asked to have rate increased, was running at slower rate due to pain. Increased, will discharge when antibiotics finished infusing.

## 2019-05-04 NOTE — DISCHARGE INSTRUCTIONS
Please go to the [place] Emergency department tomorrow or arrange follow up for PICC placement on Monday morning.

## 2019-05-04 NOTE — ED NOTES
Called and spoke with vascular access PICC RN on [facility] as well as peds PICC RN on this campus. Per RN's, booked up through end of day with PICC placement and will not be able to place PICC today. [place] PICC RN recommended patient come to ED right away tomorrow morning and have [facility] place PICC order right away to get placement tomorrow. MD notified.

## 2019-05-04 NOTE — ED NOTES
IV antibiotic still infusing. Had to decrease rate at 1650 due symptoms of pressure. PIV site shows no swelling or redness.  PIV site asymptomatic currently.

## 2019-05-05 ENCOUNTER — HOSPITAL ENCOUNTER (EMERGENCY)
Facility: CLINIC | Age: 24
Discharge: HOME | End: 2019-05-05
Attending: EMERGENCY MEDICINE | Admitting: EMERGENCY MEDICINE
Payer: COMMERCIAL

## 2019-05-05 ENCOUNTER — APPOINTMENT (OUTPATIENT)
Dept: GENERAL RADIOLOGY | Facility: CLINIC | Age: 24
End: 2019-05-05
Attending: EMERGENCY MEDICINE
Payer: COMMERCIAL

## 2019-05-05 VITALS
OXYGEN SATURATION: 100 % | SYSTOLIC BLOOD PRESSURE: 123 MMHG | BODY MASS INDEX: 27.28 KG/M2 | HEIGHT: 63 IN | HEART RATE: 105 BPM | RESPIRATION RATE: 16 BRPM | TEMPERATURE: 98.5 F | DIASTOLIC BLOOD PRESSURE: 92 MMHG

## 2019-05-05 DIAGNOSIS — Z95.828 STATUS POST PICC CENTRAL LINE PLACEMENT: ICD-10-CM

## 2019-05-05 PROCEDURE — C1751 CATH, INF, PER/CENT/MIDLINE: HCPCS

## 2019-05-05 PROCEDURE — 36569 INSJ PICC 5 YR+ W/O IMAGING: CPT

## 2019-05-05 PROCEDURE — 27211414 ZZ H ADHESIVE SKIN CLOSURE, DERMABOND

## 2019-05-05 PROCEDURE — 27211417 ZZ H KIT, VPS RHYTHM STYLET

## 2019-05-05 PROCEDURE — 40000986 XR CHEST PORT 1 VW

## 2019-05-05 PROCEDURE — 25000128 H RX IP 250 OP 636: Performed by: EMERGENCY MEDICINE

## 2019-05-05 PROCEDURE — 25000125 ZZHC RX 250: Performed by: EMERGENCY MEDICINE

## 2019-05-05 PROCEDURE — 99285 EMERGENCY DEPT VISIT HI MDM: CPT | Mod: 25 | Performed by: EMERGENCY MEDICINE

## 2019-05-05 RX ORDER — HEPARIN SODIUM,PORCINE 10 UNIT/ML
5-10 VIAL (ML) INTRAVENOUS ONCE
Status: COMPLETED | OUTPATIENT
Start: 2019-05-05 | End: 2019-05-05

## 2019-05-05 RX ORDER — LIDOCAINE 40 MG/G
CREAM TOPICAL
Status: DISCONTINUED | OUTPATIENT
Start: 2019-05-05 | End: 2019-05-05 | Stop reason: HOSPADM

## 2019-05-05 RX ORDER — HEPARIN SODIUM,PORCINE 10 UNIT/ML
2-5 VIAL (ML) INTRAVENOUS
Status: DISCONTINUED | OUTPATIENT
Start: 2019-05-05 | End: 2019-05-05 | Stop reason: HOSPADM

## 2019-05-05 RX ADMIN — LIDOCAINE HYDROCHLORIDE 2 ML: 10 INJECTION, SOLUTION EPIDURAL; INFILTRATION; INTRACAUDAL; PERINEURAL at 10:12

## 2019-05-05 RX ADMIN — Medication 5 ML: at 12:17

## 2019-05-05 ASSESSMENT — ENCOUNTER SYMPTOMS
WOUND: 1
COLOR CHANGE: 0

## 2019-05-05 NOTE — ED AVS SNAPSHOT
[facility] [place], Emergency Department  [address] [place] [id number]  Phone:  [phone]                                    Samara Oropeza   MRN: [id number]    Department:  [facility], Emergency Department   Date of Visit:  5/5/2019           After Visit Summary Signature Page    I have received my discharge instructions, and my questions have been answered. I have discussed any challenges I see with this plan with the nurse or doctor.    ..........................................................................................................................................  Patient/Patient Representative Signature      ..........................................................................................................................................  Patient Representative Print Name and Relationship to Patient    ..................................................               ................................................  Date                                   Time    ..........................................................................................................................................  Reviewed by Signature/Title    ...................................................              ..............................................  Date                                               Time          22EPIC Rev 08/18

## 2019-05-05 NOTE — PROVIDER NOTIFICATION
05/05/19 1005   PICC Single Lumen 05/05/19 Left Basilic   Placement Date/Time: 05/05/19 1005   Catheter Brand: [employer]  Size (Fr): 4 Fr  Lot #: [id number]  Full barrier precautions done: Yes, hand hygiene, sterile gown, sterile gloves, mask, cap, full body drape, chlorhexidine scrub  Consent Signed: Yes  Time Ou...   Site Assessment WDL   Line Status Blood return noted;Saline locked   External Cath Length (cm) 5 cm   Extremity Circumference (cm) 33.5 cm   Extravasation? No   Dressing Intervention Chlorhexidine patch;Transparent;Securing device;New dressing;Other (Comment)  ([id number])   Dressing Change Due 05/12/19   PICC Comment PICC insertion done

## 2019-05-05 NOTE — ED TRIAGE NOTES
Pt presents ambulatory to triage after PICC line fell out yesterday (using PICC for IV abx). Pt reports home care visits in place for abx and just needs new line placed.

## 2019-05-05 NOTE — DISCHARGE INSTRUCTIONS
TODAY'S VISIT:  You were seen today for PICC line placement.  This has been replaced.  Please continue to watch your wound and follow-up with surgery as soon as you are able.  Return if any signs or symptoms of worsening infection such as fever, chills, nausea, vomiting, or worsening of your wound.

## 2019-05-05 NOTE — ED PROVIDER NOTES
[place] EMERGENCY DEPARTMENT ([facility])  5/05/19    History     Chief Complaint   Patient presents with     Vascular Access Problem     The history is provided by the patient, a parent and medical records.     Samara Oropeza is a 23 year old female with a past medical history significant for Bechet's syndrome, s/p left ankle fusion (1/2/2019) then developed an infection and was treated with vancomycin and has had hardware removed on 3/12/2019, then developed a renal injury to vancomycin so was switched to Daptomycin (through PICC line), as well as a medical history significant for tourette's, seizures, irregular heart beat, gastroparesis and GERD. Patient presents to the Emergency Department today seeking a new PICC line. Patient reports that her PICC line came out yesterday morning. She was seen here in the ED yesterday and was given a dose of Daptomycin she was told to come back to the ED today to try to get a new PICC Line placed for ongoing antibiotics. Patient denies any redness or pain at site of previous PICC line. Patient has been on her Daptomycin for the last 9 weeks. She has noticed leakage from her incision site on her left foot that started as clear liquid that progressed to yellow and then white chunks. She feels fluid going up her shin. Patient is trying to get in to see her Surgeon tomorrow, saw infectious disease on Friday.  No fevers, chills, nausea, vomiting, or other signs of systemic illness.    I have reviewed the Medications, Allergies, Past Medical and Surgical History, and Social History in the Epic system.    Past Medical History:   Diagnosis Date     Anemia      Anxiety      Arthritis      Behcet's disease (H)      Cervical adenitis May 2010     Chronic abdominal pain      Constipation, chronic 1994     Fibromyalgia      Gastro-oesophageal reflux disease      Gastroparesis      Irregular heart beat     tachycardia, has had workup     Migraines      Neuromuscular  disorder (H)     fibramyalgia     Palpitations      Seizure (H)      Seizures (H)     unknown etiology     Syncope      Tourette's        Past Surgical History:   Procedure Laterality Date     ARTHROSCOPY ANKLE, REPAIR LIGAMENT Left 1/2/2019    Procedure: Ankle arthroscopy and sinus tarsi evacuation, ligament repair, left lower extremity;  Surgeon: Andres Johnson DPM;  Location: [facility] OR     ARTHROSCOPY KNEE WITH PATELLAR REALIGNMENT  7/25/2013    Procedure: ARTHROSCOPY KNEE WITH PATELLAR REALIGNMENT;  Left Knee Arthroscopy, Medial Patellofemoral Ligament Reconstruction with Allograft  ;  Surgeon: Jennifer Acevedo MD;  Location: [facility] OR     COLONOSCOPY  2015     DENTAL SURGERY  1996    Teeth removal     ENDOSCOPY UPPER, COLONOSCOPY, COMBINED  2005     HC ESOPH/GAS REFLUX TEST W NASAL IMPED >1 HR N/A 2/15/2017    Procedure: ESOPHAGEAL IMPEDENCE FUNCTION TEST WITH 24 HOUR PH GREATER THAN 1 HOUR;  Surgeon: Timothy Matta MD;  Location: [facility] GI     IR PICC PLACEMENT > 5 YRS OF AGE  3/13/2019     IRRIGATION AND DEBRIDEMENT FOOT, COMBINED Left 3/12/2019    Procedure: COMBINED IRRIGATION AND DEBRIDEMENT LEFT ANKLE;  Surgeon: Micha Glover MD;  Location: [facility] OR     PICC INSERTION Left 05/05/2019    4Fr - 45cm (5cm external), Basilic vein, SVC RA junction       Family History   Problem Relation Age of Onset     Depression Mother      Neurologic Disorder Mother         Migraines, take imitrex injection.  Also in maternal grandmother.       Alcohol/Drug Father      Hypertension Father      Depression Father      Osteoarthritis Father      Cardiovascular Maternal Grandmother      Depression Maternal Grandmother      Hypertension Maternal Grandmother      Alzheimer Disease Maternal Grandmother      Cardiovascular Maternal Grandfather      Hypertension Maternal Grandfather      Depression Maternal Grandfather      Alcohol/Drug Maternal Grandfather      Cardiovascular Paternal Grandmother      Hypertension  Paternal Grandmother      Cardiovascular Paternal Grandfather      Hypertension Paternal Grandfather      Glaucoma No family hx of      Macular Degeneration No family hx of        Social History     Tobacco Use     Smoking status: Never Smoker     Smokeless tobacco: Never Used   Substance Use Topics     Alcohol use: Yes     Alcohol/week: 0.6 oz     Types: 1 Standard drinks or equivalent per week     Comment: rarely       Current Facility-Administered Medications   Medication     heparin lock flush 10 UNIT/ML injection 5 mL     Current Outpatient Medications   Medication     albuterol (PROAIR HFA/PROVENTIL HFA/VENTOLIN HFA) 108 (90 BASE) MCG/ACT Inhaler     amitriptyline (ELAVIL) 25 MG tablet     artificial tears OINT ophthalmic ointment     aspirin (ASPIRIN CHILDRENS) 81 MG chewable tablet     benzocaine (TOPICALE XTRA) 20 % GEL     betamethasone valerate (VALISONE) 0.1 % cream     bisacodyl (DULCOLAX) 5 MG EC tablet     citalopram (CELEXA) 10 MG tablet     clobetasol (TEMOVATE) 0.05 % cream     colchicine (COLCYRS) 0.6 MG tablet     cyproheptadine (PERIACTIN) 4 MG tablet     DAPTOmycin 350 MG SOLR     dicyclomine (BENTYL) 20 MG tablet     diphenhydrAMINE (BENADRYL) 25 MG tablet     EPINEPHrine (EPIPEN 2-EAMON) 0.3 MG/0.3ML injection     gabapentin (NEURONTIN) 100 MG capsule     guanFACINE (TENEX) 1 MG tablet     hydrocortisone 1 % CREA cream     hyoscyamine (ANASPAZ/LEVSIN) 0.125 MG tablet     hypromellose (GENTEAL) 0.3 % SOLN     lactulose 20 GM/30ML SOLN     lidocaine (LMX4) 4 % external cream     linaclotide (LINZESS) 290 MCG capsule     LORazepam (ATIVAN) 0.5 MG tablet     Magic Mouthwash (FV std formula) lidocaine visc 2% 2.5mL/5mL & maalox/mylanta w/ simeth 2.5mL/5mL & diphenhydrAMINE 5mg/5mL     nitroFURantoin macrocrystal-monohydrate (MACROBID) 100 MG capsule     norgestimate-ethinyl estradiol (ORTHO-CYCLEN/SPRINTEC) 0.25-35 MG-MCG tablet     omeprazole (PRILOSEC) 40 MG capsule     ondansetron (ZOFRAN-ODT) 8  MG ODT tab     order for DME     order for DME     order for DME     order for DME     polyethylene glycol (MIRALAX/GLYCOLAX) powder     sennosides (SENOKOT) 8.6 MG tablet     sodium chloride 0.9% SOLN BOLUS     SPRINTEC 28 0.25-35 MG-MCG tablet     sucralfate (CARAFATE) 1 GM/10ML suspension        Allergies   Allergen Reactions     Amoxil [Penicillins] Rash     Dad unsure of reaction.     Bee Venom Anaphylaxis     Contrast Dye Rash     Contrast Media Ready-Box [facility], 04/09/2014.; Contrast Media Ready-Box [facility], 04/09/2014.  NOTE: this is a contrast media oral with iodine. Premedicate with methylpred standard for IV contrast, request barium contrast for oral contrast.     Orange Fruit [Citrus] Anaphylaxis     Pineapple Anaphylaxis, Difficulty breathing and Rash     Reglan [Metoclopramide] Other (See Comments)     IV dose only, in ER, rapid heart rate.     Ace Inhibitors      Difficulty in breathing and GI upset     Amitiza [Lubiprostone] Nausea and Vomiting     Amoxicillin-Pot Clavulanate      Midazolam Unknown     Other reaction(s): Unknown  parent states that when pt takes this medication, she wakes up being very violent .  parent states that when pt takes this medication, she wakes up being very violent .     No Clinical Screening - See Comments      Bleech/ chest tightness, itchy throat, swollen tongue, hives     Tizanidine Other (See Comments)     Confusion, back pain, photophobia, abdominal pain, shaking, anxious       Versed      Coming out of pelvic exam at age of 5, was kicking and screaming when coming out of the versed.     Adhesive Tape Rash     Azithromycin Hives and Rash     Cephalexin Itching and Rash     Itchy mouth  Itchy mouth     Keflex [Cephalexin-Fd&C Yellow #6] Hives       Review of Systems   Skin: Positive for wound (Left foot drainage). Negative for color change.   All other systems reviewed and are negative.      Physical Exam   BP: 126/78  Pulse: 105  Heart Rate: 85  Temp: 98.5  F (36.9  " C)  Resp: 16  Height: 160 cm (5' 3\")  SpO2: 98 %      Physical Exam   General: awake, alert, NAD  Head: normal cephalic  HEENT: pupils equal, conjugate gaze in tact  Neck: Supple  CV: regular rate and rhythm without murmur  Lungs: clear to ascultation  Abd: soft, non-tender, no guarding, no peritoneal signs  EXT: No active drainage, redness or erythema around the incision site.  Fluctuance above the wound without erythema, edema, or bruising to the area. No TTP.  Site of old PICC line clean dry and intact, no purulence or erythema.  Neuro: awake, answers questions appropriately. No focal deficits noted     ED Course   8:25 AM  The patient was seen and examined by Giles Olivarez MD in Room ED04.         Medications   lidocaine 1 % 0.5-5 mL (2 mLs Other Given 5/5/19 1012)   sodium chloride (PF) 0.9% PF flush 5-50 mL (20 mLs Intracatheter Given 5/5/19 1012)   heparin lock flush 10 UNIT/ML injection 5-10 mL (5 mLs Intracatheter Given 5/5/19 1217)     Results for orders placed or performed during the hospital encounter of 05/05/19 (from the past 24 hour(s))   XR Chest Port 1 View    Narrative    XR CHEST PORT 1 VW  5/5/2019 10:54 AM      HISTORY: assess PICC placement    COMPARISON: 4/27/2019    FINDINGS: Left upper chest from the PICC line, tip projecting over the  superior atriocaval junction. The upper abdomen is unremarkable. The  cardiac silhouette is not enlarged. No pneumothorax or pleural  effusion. No focal airspace opacities. Borderline low lung volumes.      Impression    IMPRESSION:   1. Left upper extremity PICC line tip projects over the superior  atriocaval junction.  2. Borderline low lung volumes.    I have personally reviewed the examination and initial interpretation  and I agree with the findings.    CYNDIE TRIVEDI MD     *Note: Due to a large number of results and/or encounters for the requested time period, some results have not been displayed. A complete set of results can be found in Results " Review.       Procedures          Assessments & Plan (with Medical Decision Making)   Patient is a 23-year-old female who presents in need of a new PICC line.  Patient was seen in the ER yesterday after PICC line had fallen out she got her IV dose of daptomycin for that day and was referred to come to the [place] ED during business hours today for placement of PICC line.  Patient's wound had some fluctuance that I can appreciate below the wound but I am not able to express any purulent drainage, there is no redness or erythema, she is not tender over this pocket, she is denying any systemic symptoms at this time and reports that she has recently been seen by ID and is instructed to get surgery follow-up tomorrow.  I do not see anything that appears to be worrisome or acute changes regarding this; feel that this can continue to be managed by ID and surgery without any acute or emergent intervention.    Plan will be to put in a consult for the PICC nurse and replace PICC line.     Patient got her PICC line placed while in the emergency department.  It was confirmed to be in good location.  She was discharged from the ER with instructions to continue her IV antibiotics as prescribed and follow-up with her surgeon tomorrow.    This part of the medical record was transcribed by Nelson Ravi Medical Scribe, from a dictation done by Giles Olivarez MD.     I have reviewed the nursing notes.    I have reviewed the findings, diagnosis, plan and need for follow up with the patient.       Medication List      There are no discharge medications for this visit.         Final diagnoses:   Status post PICC central line placement     INelson, am serving as a trained medical scribe to document services personally performed by Giles Olivarez MD, based on the provider's statements to me.   IGiles MD, was physically present and have reviewed and verified the accuracy of this note documented by Nelson Ravi.    5/5/2019   [facility],  [place], EMERGENCY DEPARTMENT     Giles Olivarez MD  05/05/19 1342

## 2019-05-06 ENCOUNTER — HOME INFUSION (PRE-WILLOW HOME INFUSION) (OUTPATIENT)
Dept: PHARMACY | Facility: CLINIC | Age: 24
End: 2019-05-06

## 2019-05-06 ENCOUNTER — HOSPITAL ENCOUNTER (INPATIENT)
Facility: CLINIC | Age: 24
LOS: 4 days | Discharge: HOME | DRG: 857 | End: 2019-05-10
Attending: PODIATRIST | Admitting: INTERNAL MEDICINE
Payer: COMMERCIAL

## 2019-05-06 ENCOUNTER — PATIENT OUTREACH (OUTPATIENT)
Dept: CARE COORDINATION | Facility: CLINIC | Age: 24
End: 2019-05-06

## 2019-05-06 ENCOUNTER — MYC MEDICAL ADVICE (OUTPATIENT)
Dept: FAMILY MEDICINE | Facility: CLINIC | Age: 24
End: 2019-05-06

## 2019-05-06 ENCOUNTER — OFFICE VISIT (OUTPATIENT)
Dept: PODIATRY | Facility: CLINIC | Age: 24
End: 2019-05-06
Payer: COMMERCIAL

## 2019-05-06 VITALS — BODY MASS INDEX: 27.29 KG/M2 | HEIGHT: 63 IN | RESPIRATION RATE: 12 BRPM | WEIGHT: 154 LBS

## 2019-05-06 DIAGNOSIS — F95.9 TIC: ICD-10-CM

## 2019-05-06 DIAGNOSIS — S91.002A WOUND OF LEFT ANKLE, INITIAL ENCOUNTER: Primary | ICD-10-CM

## 2019-05-06 DIAGNOSIS — T81.49XA SURGICAL SITE INFECTION: Primary | ICD-10-CM

## 2019-05-06 DIAGNOSIS — M00.9 INFECTION OF JOINT OF ANKLE (H): Primary | ICD-10-CM

## 2019-05-06 PROCEDURE — 25800030 ZZH RX IP 258 OP 636: Performed by: INTERNAL MEDICINE

## 2019-05-06 PROCEDURE — 12000000 ZZH R&B MED SURG/OB

## 2019-05-06 PROCEDURE — 99214 OFFICE O/P EST MOD 30 MIN: CPT | Performed by: PODIATRIST

## 2019-05-06 PROCEDURE — 25000128 H RX IP 250 OP 636: Performed by: INTERNAL MEDICINE

## 2019-05-06 PROCEDURE — 99223 1ST HOSP IP/OBS HIGH 75: CPT | Mod: AI | Performed by: INTERNAL MEDICINE

## 2019-05-06 RX ORDER — BISACODYL 5 MG
10 TABLET, DELAYED RELEASE (ENTERIC COATED) ORAL DAILY PRN
Status: DISCONTINUED | OUTPATIENT
Start: 2019-05-06 | End: 2019-05-10 | Stop reason: HOSPADM

## 2019-05-06 RX ORDER — NALOXONE HYDROCHLORIDE 0.4 MG/ML
.1-.4 INJECTION, SOLUTION INTRAMUSCULAR; INTRAVENOUS; SUBCUTANEOUS
Status: DISCONTINUED | OUTPATIENT
Start: 2019-05-06 | End: 2019-05-07

## 2019-05-06 RX ORDER — COLCHICINE 0.6 MG
0.3 TABLET ORAL DAILY
Status: DISCONTINUED | OUTPATIENT
Start: 2019-05-06 | End: 2019-05-08 | Stop reason: DRUGHIGH

## 2019-05-06 RX ORDER — CYPROHEPTADINE HYDROCHLORIDE 4 MG/1
4 TABLET ORAL AT BEDTIME
Status: DISCONTINUED | OUTPATIENT
Start: 2019-05-06 | End: 2019-05-10 | Stop reason: HOSPADM

## 2019-05-06 RX ORDER — DIPHENHYDRAMINE HCL 25 MG
25 CAPSULE ORAL 3 TIMES DAILY PRN
Status: DISCONTINUED | OUTPATIENT
Start: 2019-05-06 | End: 2019-05-10 | Stop reason: HOSPADM

## 2019-05-06 RX ORDER — ONDANSETRON 2 MG/ML
4 INJECTION INTRAMUSCULAR; INTRAVENOUS EVERY 6 HOURS PRN
Status: DISCONTINUED | OUTPATIENT
Start: 2019-05-06 | End: 2019-05-10 | Stop reason: HOSPADM

## 2019-05-06 RX ORDER — GUAIFENESIN 600 MG/1
600 TABLET, EXTENDED RELEASE ORAL 2 TIMES DAILY
Status: DISCONTINUED | OUTPATIENT
Start: 2019-05-06 | End: 2019-05-07

## 2019-05-06 RX ORDER — OXYCODONE HYDROCHLORIDE 5 MG/1
5-10 TABLET ORAL
Status: DISCONTINUED | OUTPATIENT
Start: 2019-05-06 | End: 2019-05-07

## 2019-05-06 RX ORDER — GUANFACINE 1 MG/1
TABLET ORAL
Qty: 180 TABLET | Refills: 0 | Status: SHIPPED | OUTPATIENT
Start: 2019-05-06 | End: 2019-06-11

## 2019-05-06 RX ORDER — DAPTOMYCIN 50 MG/ML
400 INJECTION, POWDER, LYOPHILIZED, FOR SOLUTION INTRAVENOUS DAILY
Status: DISCONTINUED | OUTPATIENT
Start: 2019-05-06 | End: 2019-05-06

## 2019-05-06 RX ORDER — POLYETHYLENE GLYCOL 3350 17 G/17G
17 POWDER, FOR SOLUTION ORAL 3 TIMES DAILY
Status: DISCONTINUED | OUTPATIENT
Start: 2019-05-06 | End: 2019-05-10 | Stop reason: HOSPADM

## 2019-05-06 RX ORDER — SUCRALFATE ORAL 1 G/10ML
1 SUSPENSION ORAL 4 TIMES DAILY
Status: DISCONTINUED | OUTPATIENT
Start: 2019-05-06 | End: 2019-05-10 | Stop reason: HOSPADM

## 2019-05-06 RX ORDER — ALBUTEROL SULFATE 90 UG/1
2 AEROSOL, METERED RESPIRATORY (INHALATION) EVERY 6 HOURS PRN
Status: DISCONTINUED | OUTPATIENT
Start: 2019-05-06 | End: 2019-05-10 | Stop reason: HOSPADM

## 2019-05-06 RX ORDER — LORAZEPAM 0.5 MG/1
0.5 TABLET ORAL EVERY 6 HOURS PRN
Status: DISCONTINUED | OUTPATIENT
Start: 2019-05-06 | End: 2019-05-10 | Stop reason: HOSPADM

## 2019-05-06 RX ORDER — ONDANSETRON 4 MG/1
4 TABLET, ORALLY DISINTEGRATING ORAL EVERY 6 HOURS PRN
Status: DISCONTINUED | OUTPATIENT
Start: 2019-05-06 | End: 2019-05-10 | Stop reason: HOSPADM

## 2019-05-06 RX ORDER — ACETAMINOPHEN 325 MG/1
650 TABLET ORAL EVERY 4 HOURS PRN
Status: DISCONTINUED | OUTPATIENT
Start: 2019-05-06 | End: 2019-05-08

## 2019-05-06 RX ORDER — SENNOSIDES 8.6 MG
3 TABLET ORAL 2 TIMES DAILY
Status: DISCONTINUED | OUTPATIENT
Start: 2019-05-06 | End: 2019-05-10 | Stop reason: HOSPADM

## 2019-05-06 RX ORDER — CITALOPRAM HYDROBROMIDE 10 MG/1
10 TABLET ORAL DAILY
Status: DISCONTINUED | OUTPATIENT
Start: 2019-05-06 | End: 2019-05-10 | Stop reason: HOSPADM

## 2019-05-06 ASSESSMENT — ACTIVITIES OF DAILY LIVING (ADL): ADLS_ACUITY_SCORE: 14

## 2019-05-06 ASSESSMENT — MIFFLIN-ST. JEOR: SCORE: 1417.67

## 2019-05-06 NOTE — TELEPHONE ENCOUNTER
Sonja,     Please see Yan Engineshart message from patient. FYI, we cancelled her appt today.      Per chart review, RX for guanfacine was for 4 month supply. Medication is being filled for 1 time refill only due to:  Patient needs to be seen because pt due for f/u.    Criselda Sanabria RN  [facility]

## 2019-05-06 NOTE — Clinical Note
Santos Pastrana saw Samara on Monday for a new wound on her left ankle at the level of the previous ligament repair and I&D.  She was admitted to [facility] and I did a new I&D on Tuesday.  She's being evaluated by our ID colleagues for continued antibiotic therapy.Thank Ha

## 2019-05-06 NOTE — PROGRESS NOTES
"Foot & Ankle Surgery   May 6, 2019    S:  Pt is seen today for evaluation of left ankle infection.  She has been in the ER yesterday, 5/4/19 and 5/2/19 as her PICC kept falling out. She's been on Daptomycin.  She noticed this morning there was \"blood everywhere\".  She cleaned it up a little bit and was scheduled today for a wound check.  She feels fluid going up her shin and states you can hear it crackling    Vitals:    05/06/19 1049   Resp: 12   Weight: 69.9 kg (154 lb)   Height: 1.6 m (5' 3\")   '      ROS - Pos for CC.  Patient denies current nausea, vomiting, chills, fevers, belly pain, calf pain, chest pain or SOB.  Complete remainder of ROS it otherwise neg.      PE:  Gen:   No apparent distress  Eye:    Visual scanning without deficit  Ear:    Response to auditory stimuli wnl  Lung:    Non-labored breathing on RA noted  Abd:    NTND per patient report  Lymph:    Neg for pitting/non-pitting edema BLE  Vasc:    Pulses palpable, CFT minimally delayed  Neuro:    Light touch sensation intact to all sensory nerve distributions without paresthesias  Derm:    Pinpoint wound along lateral aspect of the incision.  No obvious drainage or soft tissue crepitus noted today.  No cellulitis.   MSK:    ROM, strength wnl without limitation, no pain on palpation noted.  Calf:    Neg for redness, swelling or tenderness    Assessment:  23 year old female with new wound opening and drainage left ankle in setting of previous infection requiring PICC IV abx and surgical debridement      Plan:  Discussed etiologies, anatomy and options  1.  New wound opening and drainage left ankle in setting of previous infection requiring PICC IV abx and surgical debridement  -while there is no cellulitis noted, we will admit her to [facility] Hospitalist service for MRI and I&D of wound.    -will ask ID to evaluate patient for further IV/PO abx plan  -irrigation and debridement left ankle tomorrow afternoon at [facility] @ 5:15    Body mass index is 27.28 " kg/m .  Weight management plan: Patient was referred to their PCP to discuss a diet and exercise plan.         Andres Johnson DPM FACFAS FACFAOM  Podiatric Foot & Ankle Surgeon  [facility]  [phone]

## 2019-05-06 NOTE — LETTER
Transition Communication Hand-off for Care Transitions to Next Level of Care Provider    Name: Samara Oropeza  : 1995  MRN #: [id number]  Primary Care Provider: Sonja Abreu  Primary Care MD Name: NP , AVA   Primary Clinic: [address]  [facility] [address]  Primary Care Clinic Name: [facility]   Reason for Hospitalization:  abscess  Infection of joint of ankle (H)  Admit Date/Time: 2019  6:04 PM  Discharge Date: 5/10/2019  Payor Source: Payor: MEDICA / Plan: MEDICA CHOICE / Product Type: Indemnity /     Readmission Assessment Measure (ILYA) Risk Score/category: ELEVATED        Reason for Communication Hand-off Referral: Other Pt admitted 3rd time since  for Ankle injury and OR. Asssed pt for GAPS and or concerns. FOllowed IN HOuse in collboartion with Care team + [facility] for dsicharge planning and Care coordination.     Discharge Plan: RTN Home with [facility]   Discharge Needs Assessment:  Needs      Most Recent Value   # of Referrals Placed by [facility]  Home Infusion, Insurance Verification for DME        Follow-up specialty is recommended:     2019  1:30 PM [facility] MA VISIT [facility]   2019  2:00 PM Mary Kay Garcia DO [facility]   2019  2:00 PM Rosangela Hollingsworth, [facility]   2019  2:00 PM George Finn, LICSW [facility]   5/10/2019  1:30 PM Tuan Montes MD [facility]   2019  4:30 PM Blayne Alexandre MD [facility]   2019 11:00 AM [facility] [place]   2019 10:30 AM Austen Marquez MD [facility]   2019  2:20 PM Alejandrina Hernandez MD [facility]   10/30/2019  1:00 PM Joseph De La Torre MD [facility]                 Key Recommendations: Met with pt at bedside who indicated a history of ankle injury starting in 2019, a hardware removal in 3/2019 and Home IV Abx, rtn now with OR planned for wound dehiscence.   Discharge follow  up recommendations with Podiatry as indicated on discharge AVS   She was discharged with resumption of [facility].     Her Knee scooter ordfer was extended and addressed with [facility] , they were faxed the udpated order    Mikaela Rudolph    AVS/Discharge Summary is the source of truth; this is a helpful guide for improved communication of patient story

## 2019-05-07 ENCOUNTER — ANESTHESIA EVENT (OUTPATIENT)
Dept: SURGERY | Facility: CLINIC | Age: 24
DRG: 857 | End: 2019-05-07
Payer: COMMERCIAL

## 2019-05-07 ENCOUNTER — ANESTHESIA (OUTPATIENT)
Dept: SURGERY | Facility: CLINIC | Age: 24
DRG: 857 | End: 2019-05-07
Payer: COMMERCIAL

## 2019-05-07 ENCOUNTER — APPOINTMENT (OUTPATIENT)
Dept: MRI IMAGING | Facility: CLINIC | Age: 24
DRG: 857 | End: 2019-05-07
Attending: INTERNAL MEDICINE
Payer: COMMERCIAL

## 2019-05-07 ENCOUNTER — HOME INFUSION (PRE-WILLOW HOME INFUSION) (OUTPATIENT)
Dept: PHARMACY | Facility: CLINIC | Age: 24
End: 2019-05-07

## 2019-05-07 LAB
ANION GAP SERPL CALCULATED.3IONS-SCNC: 5 MMOL/L (ref 3–14)
BUN SERPL-MCNC: 11 MG/DL (ref 7–30)
CALCIUM SERPL-MCNC: 8.3 MG/DL (ref 8.5–10.1)
CHLORIDE SERPL-SCNC: 108 MMOL/L (ref 94–109)
CO2 SERPL-SCNC: 26 MMOL/L (ref 20–32)
CREAT SERPL-MCNC: 0.63 MG/DL (ref 0.52–1.04)
ERYTHROCYTE [DISTWIDTH] IN BLOOD BY AUTOMATED COUNT: 13.1 % (ref 10–15)
GFR SERPL CREATININE-BSD FRML MDRD: >90 ML/MIN/{1.73_M2}
GLUCOSE SERPL-MCNC: 110 MG/DL (ref 70–99)
HCG UR QL: NEGATIVE
HCT VFR BLD AUTO: 30.5 % (ref 35–47)
HGB BLD-MCNC: 10 G/DL (ref 11.7–15.7)
MCH RBC QN AUTO: 29.5 PG (ref 26.5–33)
MCHC RBC AUTO-ENTMCNC: 32.8 G/DL (ref 31.5–36.5)
MCV RBC AUTO: 90 FL (ref 78–100)
PLATELET # BLD AUTO: 181 10E9/L (ref 150–450)
POTASSIUM SERPL-SCNC: 3.8 MMOL/L (ref 3.4–5.3)
RBC # BLD AUTO: 3.39 10E12/L (ref 3.8–5.2)
SODIUM SERPL-SCNC: 139 MMOL/L (ref 133–144)
WBC # BLD AUTO: 4.6 10E9/L (ref 4–11)

## 2019-05-07 PROCEDURE — 85027 COMPLETE CBC AUTOMATED: CPT | Performed by: INTERNAL MEDICINE

## 2019-05-07 PROCEDURE — 25000125 ZZHC RX 250: Performed by: PODIATRIST

## 2019-05-07 PROCEDURE — 0JBR0ZZ EXCISION OF LEFT FOOT SUBCUTANEOUS TISSUE AND FASCIA, OPEN APPROACH: ICD-10-PCS | Performed by: PODIATRIST

## 2019-05-07 PROCEDURE — 87205 SMEAR GRAM STAIN: CPT | Performed by: PODIATRIST

## 2019-05-07 PROCEDURE — 25000128 H RX IP 250 OP 636: Performed by: ANESTHESIOLOGY

## 2019-05-07 PROCEDURE — 11043 DBRDMT MUSC&/FSCA 1ST 20/<: CPT | Mod: LT | Performed by: PODIATRIST

## 2019-05-07 PROCEDURE — 25000128 H RX IP 250 OP 636: Performed by: NURSE ANESTHETIST, CERTIFIED REGISTERED

## 2019-05-07 PROCEDURE — 25000128 H RX IP 250 OP 636: Performed by: PODIATRIST

## 2019-05-07 PROCEDURE — 25800030 ZZH RX IP 258 OP 636: Performed by: PODIATRIST

## 2019-05-07 PROCEDURE — 36000050 ZZH SURGERY LEVEL 2 1ST 30 MIN: Performed by: PODIATRIST

## 2019-05-07 PROCEDURE — 25800030 ZZH RX IP 258 OP 636: Performed by: NURSE ANESTHETIST, CERTIFIED REGISTERED

## 2019-05-07 PROCEDURE — 12000000 ZZH R&B MED SURG/OB

## 2019-05-07 PROCEDURE — 71000012 ZZH RECOVERY PHASE 1 LEVEL 1 FIRST HR: Performed by: PODIATRIST

## 2019-05-07 PROCEDURE — 37000009 ZZH ANESTHESIA TECHNICAL FEE, EACH ADDTL 15 MIN: Performed by: PODIATRIST

## 2019-05-07 PROCEDURE — 37000008 ZZH ANESTHESIA TECHNICAL FEE, 1ST 30 MIN: Performed by: PODIATRIST

## 2019-05-07 PROCEDURE — 40000306 ZZH STATISTIC PRE PROC ASSESS II: Performed by: PODIATRIST

## 2019-05-07 PROCEDURE — 25000132 ZZH RX MED GY IP 250 OP 250 PS 637: Performed by: PODIATRIST

## 2019-05-07 PROCEDURE — 73723 MRI JOINT LWR EXTR W/O&W/DYE: CPT | Mod: LT

## 2019-05-07 PROCEDURE — A9585 GADOBUTROL INJECTION: HCPCS | Performed by: PODIATRIST

## 2019-05-07 PROCEDURE — 87077 CULTURE AEROBIC IDENTIFY: CPT | Performed by: PODIATRIST

## 2019-05-07 PROCEDURE — 25000125 ZZHC RX 250: Performed by: INTERNAL MEDICINE

## 2019-05-07 PROCEDURE — 27210794 ZZH OR GENERAL SUPPLY STERILE: Performed by: PODIATRIST

## 2019-05-07 PROCEDURE — 99207 ZZC NON-BILLABLE SERV PER CHARTING: CPT | Performed by: HOSPITALIST

## 2019-05-07 PROCEDURE — 87070 CULTURE OTHR SPECIMN AEROBIC: CPT | Performed by: PODIATRIST

## 2019-05-07 PROCEDURE — 81025 URINE PREGNANCY TEST: CPT | Performed by: ANESTHESIOLOGY

## 2019-05-07 PROCEDURE — 36000052 ZZH SURGERY LEVEL 2 EA 15 ADDTL MIN: Performed by: PODIATRIST

## 2019-05-07 PROCEDURE — 80048 BASIC METABOLIC PNL TOTAL CA: CPT | Performed by: INTERNAL MEDICINE

## 2019-05-07 PROCEDURE — 25000132 ZZH RX MED GY IP 250 OP 250 PS 637: Performed by: INTERNAL MEDICINE

## 2019-05-07 PROCEDURE — 25800025 ZZH RX 258: Performed by: PODIATRIST

## 2019-05-07 PROCEDURE — 25500064 ZZH RX 255 OP 636: Performed by: PODIATRIST

## 2019-05-07 PROCEDURE — 71000013 ZZH RECOVERY PHASE 1 LEVEL 1 EA ADDTL HR: Performed by: PODIATRIST

## 2019-05-07 PROCEDURE — 87186 SC STD MICRODIL/AGAR DIL: CPT | Performed by: PODIATRIST

## 2019-05-07 PROCEDURE — 25000125 ZZHC RX 250: Performed by: NURSE ANESTHETIST, CERTIFIED REGISTERED

## 2019-05-07 PROCEDURE — 87075 CULTR BACTERIA EXCEPT BLOOD: CPT | Performed by: PODIATRIST

## 2019-05-07 RX ORDER — MEPERIDINE HYDROCHLORIDE 50 MG/ML
12.5 INJECTION INTRAMUSCULAR; INTRAVENOUS; SUBCUTANEOUS
Status: DISCONTINUED | OUTPATIENT
Start: 2019-05-07 | End: 2019-05-07 | Stop reason: HOSPADM

## 2019-05-07 RX ORDER — MAGNESIUM HYDROXIDE 1200 MG/15ML
LIQUID ORAL PRN
Status: DISCONTINUED | OUTPATIENT
Start: 2019-05-07 | End: 2019-05-07 | Stop reason: HOSPADM

## 2019-05-07 RX ORDER — PROPOFOL 10 MG/ML
INJECTION, EMULSION INTRAVENOUS PRN
Status: DISCONTINUED | OUTPATIENT
Start: 2019-05-07 | End: 2019-05-07

## 2019-05-07 RX ORDER — ACETAMINOPHEN 325 MG/1
975 TABLET ORAL EVERY 8 HOURS
Status: DISCONTINUED | OUTPATIENT
Start: 2019-05-07 | End: 2019-05-10 | Stop reason: HOSPADM

## 2019-05-07 RX ORDER — NALOXONE HYDROCHLORIDE 0.4 MG/ML
.1-.4 INJECTION, SOLUTION INTRAMUSCULAR; INTRAVENOUS; SUBCUTANEOUS
Status: DISCONTINUED | OUTPATIENT
Start: 2019-05-07 | End: 2019-05-07 | Stop reason: HOSPADM

## 2019-05-07 RX ORDER — FENTANYL CITRATE 50 UG/ML
25-50 INJECTION, SOLUTION INTRAMUSCULAR; INTRAVENOUS EVERY 5 MIN PRN
Status: DISCONTINUED | OUTPATIENT
Start: 2019-05-07 | End: 2019-05-07 | Stop reason: HOSPADM

## 2019-05-07 RX ORDER — GADOBUTROL 604.72 MG/ML
7.5 INJECTION INTRAVENOUS ONCE
Status: COMPLETED | OUTPATIENT
Start: 2019-05-07 | End: 2019-05-07

## 2019-05-07 RX ORDER — LIDOCAINE 40 MG/G
CREAM TOPICAL
Status: DISCONTINUED | OUTPATIENT
Start: 2019-05-07 | End: 2019-05-10 | Stop reason: HOSPADM

## 2019-05-07 RX ORDER — ACETAMINOPHEN 325 MG/1
650 TABLET ORAL EVERY 4 HOURS PRN
Status: DISCONTINUED | OUTPATIENT
Start: 2019-05-10 | End: 2019-05-10 | Stop reason: HOSPADM

## 2019-05-07 RX ORDER — METOPROLOL TARTRATE 1 MG/ML
1-2 INJECTION, SOLUTION INTRAVENOUS EVERY 5 MIN PRN
Status: DISCONTINUED | OUTPATIENT
Start: 2019-05-07 | End: 2019-05-07 | Stop reason: HOSPADM

## 2019-05-07 RX ORDER — SODIUM CHLORIDE, SODIUM LACTATE, POTASSIUM CHLORIDE, CALCIUM CHLORIDE 600; 310; 30; 20 MG/100ML; MG/100ML; MG/100ML; MG/100ML
INJECTION, SOLUTION INTRAVENOUS CONTINUOUS
Status: DISCONTINUED | OUTPATIENT
Start: 2019-05-07 | End: 2019-05-07 | Stop reason: HOSPADM

## 2019-05-07 RX ORDER — LIDOCAINE 40 MG/G
CREAM TOPICAL
Status: CANCELLED | OUTPATIENT
Start: 2019-05-07

## 2019-05-07 RX ORDER — SODIUM CHLORIDE, SODIUM LACTATE, POTASSIUM CHLORIDE, CALCIUM CHLORIDE 600; 310; 30; 20 MG/100ML; MG/100ML; MG/100ML; MG/100ML
INJECTION, SOLUTION INTRAVENOUS CONTINUOUS
Status: CANCELLED | OUTPATIENT
Start: 2019-05-07

## 2019-05-07 RX ORDER — OXYCODONE HYDROCHLORIDE 5 MG/1
5-10 TABLET ORAL
Status: DISCONTINUED | OUTPATIENT
Start: 2019-05-07 | End: 2019-05-10 | Stop reason: HOSPADM

## 2019-05-07 RX ORDER — DIPHENHYDRAMINE HYDROCHLORIDE 50 MG/ML
25 INJECTION INTRAMUSCULAR; INTRAVENOUS ONCE
Status: DISCONTINUED | OUTPATIENT
Start: 2019-05-07 | End: 2019-05-07 | Stop reason: HOSPADM

## 2019-05-07 RX ORDER — HYDROMORPHONE HYDROCHLORIDE 1 MG/ML
.3-.5 INJECTION, SOLUTION INTRAMUSCULAR; INTRAVENOUS; SUBCUTANEOUS EVERY 10 MIN PRN
Status: DISCONTINUED | OUTPATIENT
Start: 2019-05-07 | End: 2019-05-07 | Stop reason: HOSPADM

## 2019-05-07 RX ORDER — POLYETHYLENE GLYCOL 3350 17 G/17G
17 POWDER, FOR SOLUTION ORAL DAILY PRN
Status: DISCONTINUED | OUTPATIENT
Start: 2019-05-07 | End: 2019-05-10 | Stop reason: HOSPADM

## 2019-05-07 RX ORDER — DIPHENHYDRAMINE HYDROCHLORIDE 50 MG/ML
25 INJECTION INTRAMUSCULAR; INTRAVENOUS EVERY 6 HOURS PRN
Status: DISCONTINUED | OUTPATIENT
Start: 2019-05-07 | End: 2019-05-07 | Stop reason: HOSPADM

## 2019-05-07 RX ORDER — ONDANSETRON 2 MG/ML
INJECTION INTRAMUSCULAR; INTRAVENOUS PRN
Status: DISCONTINUED | OUTPATIENT
Start: 2019-05-07 | End: 2019-05-07

## 2019-05-07 RX ORDER — ONDANSETRON 4 MG/1
4 TABLET, ORALLY DISINTEGRATING ORAL EVERY 30 MIN PRN
Status: DISCONTINUED | OUTPATIENT
Start: 2019-05-07 | End: 2019-05-07 | Stop reason: HOSPADM

## 2019-05-07 RX ORDER — SODIUM CHLORIDE, SODIUM LACTATE, POTASSIUM CHLORIDE, CALCIUM CHLORIDE 600; 310; 30; 20 MG/100ML; MG/100ML; MG/100ML; MG/100ML
INJECTION, SOLUTION INTRAVENOUS CONTINUOUS PRN
Status: DISCONTINUED | OUTPATIENT
Start: 2019-05-07 | End: 2019-05-07

## 2019-05-07 RX ORDER — ALBUTEROL SULFATE 0.83 MG/ML
2.5 SOLUTION RESPIRATORY (INHALATION) EVERY 4 HOURS PRN
Status: DISCONTINUED | OUTPATIENT
Start: 2019-05-07 | End: 2019-05-07 | Stop reason: HOSPADM

## 2019-05-07 RX ORDER — OXYCODONE HYDROCHLORIDE 5 MG/1
5 TABLET ORAL EVERY 4 HOURS PRN
Status: DISCONTINUED | OUTPATIENT
Start: 2019-05-07 | End: 2019-05-07

## 2019-05-07 RX ORDER — COLCHICINE 0.6 MG/1
0.6 TABLET ORAL 2 TIMES DAILY
COMMUNITY
End: 2019-06-11

## 2019-05-07 RX ORDER — LIDOCAINE HYDROCHLORIDE 10 MG/ML
INJECTION, SOLUTION INFILTRATION; PERINEURAL PRN
Status: DISCONTINUED | OUTPATIENT
Start: 2019-05-07 | End: 2019-05-07

## 2019-05-07 RX ORDER — HYDRALAZINE HYDROCHLORIDE 20 MG/ML
2.5-5 INJECTION INTRAMUSCULAR; INTRAVENOUS EVERY 10 MIN PRN
Status: DISCONTINUED | OUTPATIENT
Start: 2019-05-07 | End: 2019-05-07 | Stop reason: HOSPADM

## 2019-05-07 RX ORDER — DEXAMETHASONE SODIUM PHOSPHATE 4 MG/ML
INJECTION, SOLUTION INTRA-ARTICULAR; INTRALESIONAL; INTRAMUSCULAR; INTRAVENOUS; SOFT TISSUE PRN
Status: DISCONTINUED | OUTPATIENT
Start: 2019-05-07 | End: 2019-05-07

## 2019-05-07 RX ORDER — GUANFACINE 1 MG/1
3 TABLET ORAL 2 TIMES DAILY
Status: DISCONTINUED | OUTPATIENT
Start: 2019-05-07 | End: 2019-05-10 | Stop reason: HOSPADM

## 2019-05-07 RX ORDER — NALOXONE HYDROCHLORIDE 0.4 MG/ML
.1-.4 INJECTION, SOLUTION INTRAMUSCULAR; INTRAVENOUS; SUBCUTANEOUS
Status: DISCONTINUED | OUTPATIENT
Start: 2019-05-07 | End: 2019-05-10 | Stop reason: HOSPADM

## 2019-05-07 RX ORDER — CLINDAMYCIN PHOSPHATE 900 MG/50ML
900 INJECTION, SOLUTION INTRAVENOUS SEE ADMIN INSTRUCTIONS
Status: DISCONTINUED | OUTPATIENT
Start: 2019-05-07 | End: 2019-05-07 | Stop reason: HOSPADM

## 2019-05-07 RX ORDER — BUPIVACAINE HYDROCHLORIDE 2.5 MG/ML
INJECTION, SOLUTION EPIDURAL; INFILTRATION; INTRACAUDAL PRN
Status: DISCONTINUED | OUTPATIENT
Start: 2019-05-07 | End: 2019-05-07 | Stop reason: HOSPADM

## 2019-05-07 RX ORDER — ONDANSETRON 2 MG/ML
4 INJECTION INTRAMUSCULAR; INTRAVENOUS EVERY 30 MIN PRN
Status: DISCONTINUED | OUTPATIENT
Start: 2019-05-07 | End: 2019-05-07 | Stop reason: HOSPADM

## 2019-05-07 RX ORDER — KETOROLAC TROMETHAMINE 30 MG/ML
30 INJECTION, SOLUTION INTRAMUSCULAR; INTRAVENOUS EVERY 6 HOURS PRN
Status: DISCONTINUED | OUTPATIENT
Start: 2019-05-07 | End: 2019-05-07 | Stop reason: HOSPADM

## 2019-05-07 RX ORDER — CLINDAMYCIN PHOSPHATE 900 MG/50ML
900 INJECTION, SOLUTION INTRAVENOUS
Status: DISCONTINUED | OUTPATIENT
Start: 2019-05-07 | End: 2019-05-07 | Stop reason: HOSPADM

## 2019-05-07 RX ORDER — FENTANYL CITRATE 50 UG/ML
25-50 INJECTION, SOLUTION INTRAMUSCULAR; INTRAVENOUS
Status: DISCONTINUED | OUTPATIENT
Start: 2019-05-07 | End: 2019-05-07 | Stop reason: HOSPADM

## 2019-05-07 RX ADMIN — CITALOPRAM HYDROBROMIDE 10 MG: 10 TABLET ORAL at 09:07

## 2019-05-07 RX ADMIN — AMITRIPTYLINE HYDROCHLORIDE 25 MG: 25 TABLET, FILM COATED ORAL at 00:15

## 2019-05-07 RX ADMIN — DAPTOMYCIN 400 MG: 500 INJECTION, POWDER, LYOPHILIZED, FOR SOLUTION INTRAVENOUS at 23:21

## 2019-05-07 RX ADMIN — CLINDAMYCIN PHOSPHATE 900 MG: 18 INJECTION, SOLUTION INTRAVENOUS at 15:46

## 2019-05-07 RX ADMIN — POLYETHYLENE GLYCOL 3350 17 G: 17 POWDER, FOR SOLUTION ORAL at 20:56

## 2019-05-07 RX ADMIN — AMITRIPTYLINE HYDROCHLORIDE 25 MG: 25 TABLET, FILM COATED ORAL at 22:35

## 2019-05-07 RX ADMIN — ACETAMINOPHEN 975 MG: 325 TABLET, FILM COATED ORAL at 20:36

## 2019-05-07 RX ADMIN — DIPHENHYDRAMINE HYDROCHLORIDE 25 MG: 50 INJECTION, SOLUTION INTRAMUSCULAR; INTRAVENOUS at 18:10

## 2019-05-07 RX ADMIN — Medication 0.3 MG: at 09:07

## 2019-05-07 RX ADMIN — LIDOCAINE HYDROCHLORIDE 30 MG: 10 INJECTION, SOLUTION INFILTRATION; PERINEURAL at 15:52

## 2019-05-07 RX ADMIN — SENNOSIDES 3 TABLET: 8.6 TABLET, FILM COATED ORAL at 20:36

## 2019-05-07 RX ADMIN — SUCRALFATE 1 G: 1 SUSPENSION ORAL at 20:40

## 2019-05-07 RX ADMIN — GUANFACINE 3 MG: 1 TABLET ORAL at 22:33

## 2019-05-07 RX ADMIN — MINERAL OIL AND WHITE PETROLATUM: 150; 830 OINTMENT OPHTHALMIC at 22:37

## 2019-05-07 RX ADMIN — DEXAMETHASONE SODIUM PHOSPHATE 8 MG: 4 INJECTION, SOLUTION INTRA-ARTICULAR; INTRALESIONAL; INTRAMUSCULAR; INTRAVENOUS; SOFT TISSUE at 15:52

## 2019-05-07 RX ADMIN — ONDANSETRON 4 MG: 2 INJECTION INTRAMUSCULAR; INTRAVENOUS at 15:58

## 2019-05-07 RX ADMIN — Medication 0.3 MG: at 00:15

## 2019-05-07 RX ADMIN — GUANFACINE 3 MG: 1 TABLET ORAL at 01:09

## 2019-05-07 RX ADMIN — FENTANYL CITRATE 50 MCG: 50 INJECTION, SOLUTION INTRAMUSCULAR; INTRAVENOUS at 17:50

## 2019-05-07 RX ADMIN — DIPHENHYDRAMINE HYDROCHLORIDE 25 MG: 50 INJECTION, SOLUTION INTRAMUSCULAR; INTRAVENOUS at 18:40

## 2019-05-07 RX ADMIN — MIDAZOLAM 2 MG: 1 INJECTION INTRAMUSCULAR; INTRAVENOUS at 15:46

## 2019-05-07 RX ADMIN — Medication 80 MG: at 15:52

## 2019-05-07 RX ADMIN — PROPOFOL 180 MG: 10 INJECTION, EMULSION INTRAVENOUS at 15:52

## 2019-05-07 RX ADMIN — ACETAMINOPHEN 650 MG: 325 TABLET, FILM COATED ORAL at 09:10

## 2019-05-07 RX ADMIN — SODIUM CHLORIDE, POTASSIUM CHLORIDE, SODIUM LACTATE AND CALCIUM CHLORIDE: 600; 310; 30; 20 INJECTION, SOLUTION INTRAVENOUS at 15:46

## 2019-05-07 RX ADMIN — FENTANYL CITRATE 50 MCG: 50 INJECTION, SOLUTION INTRAMUSCULAR; INTRAVENOUS at 17:25

## 2019-05-07 RX ADMIN — OXYCODONE HYDROCHLORIDE 5 MG: 5 TABLET ORAL at 20:54

## 2019-05-07 RX ADMIN — GADOBUTROL 7.5 ML: 604.72 INJECTION INTRAVENOUS at 11:00

## 2019-05-07 RX ADMIN — CYPROHEPTADINE HYDROCHLORIDE 4 MG: 4 TABLET ORAL at 22:35

## 2019-05-07 RX ADMIN — GUANFACINE 3 MG: 1 TABLET ORAL at 09:07

## 2019-05-07 ASSESSMENT — ACTIVITIES OF DAILY LIVING (ADL)
ADLS_ACUITY_SCORE: 12
RETIRED_COMMUNICATION: 0-->UNDERSTANDS/COMMUNICATES WITHOUT DIFFICULTY
FALL_HISTORY_WITHIN_LAST_SIX_MONTHS: NO
TRANSFERRING: 0-->INDEPENDENT
ADLS_ACUITY_SCORE: 14
AMBULATION: 0-->INDEPENDENT
ADLS_ACUITY_SCORE: 12
ADLS_ACUITY_SCORE: 14
WHICH_OF_THE_ABOVE_FUNCTIONAL_RISKS_HAD_A_RECENT_ONSET_OR_CHANGE?: AMBULATION;TRANSFERRING
TOILETING: 0-->INDEPENDENT
COGNITION: 0 - NO COGNITION ISSUES REPORTED
SWALLOWING: 0-->SWALLOWS FOODS/LIQUIDS WITHOUT DIFFICULTY
ADLS_ACUITY_SCORE: 14
BATHING: 0-->INDEPENDENT
RETIRED_EATING: 0-->INDEPENDENT
DRESS: 0-->INDEPENDENT

## 2019-05-07 ASSESSMENT — ENCOUNTER SYMPTOMS: SEIZURES: 1

## 2019-05-07 NOTE — ANESTHESIA CARE TRANSFER NOTE
Patient: Samara Oropeza    Procedure(s):  Irrigation and debridment, left ankle    Diagnosis: abscess  Diagnosis Additional Information: Post-operative diagnosis        sp I&D left ankle  Procedure:      Procedure(s):  Irrigation and debridment, left ankle        Anesthesia Type:   General, LMA     Note:  Airway :Face Mask  Patient transferred to:PACU  Handoff Report: Identifed the Patient, Identified the Reponsible Provider, Reviewed the pertinent medical history, Discussed the surgical course, Reviewed Intra-OP anesthesia mangement and issues during anesthesia, Set expectations for post-procedure period and Allowed opportunity for questions and acknowledgement of understanding      Vitals: (Last set prior to Anesthesia Care Transfer)    CRNA VITALS  5/7/2019 1614 - 5/7/2019 1644      5/7/2019             NIBP:  158/110  (Abnormal)     Pulse:  110    NIBP Mean:  131    SpO2:  100 %    Resp Rate (observed):  15                Electronically Signed By: EVI Verde CRNA  May 7, 2019  4:44 PM

## 2019-05-07 NOTE — ANESTHESIA POSTPROCEDURE EVALUATION
Patient: Samara Oropeza    Procedure(s):  Irrigation and debridment, left ankle    Diagnosis:abscess  Diagnosis Additional Information: Post-operative diagnosis        sp I&D left ankle  Procedure:      Procedure(s):  Irrigation and debridment, left ankle        Anesthesia Type:  General, LMA    Note:  Anesthesia Post Evaluation    Patient location during evaluation: PACU  Patient participation: Able to fully participate in evaluation  Level of consciousness: awake  Pain management: adequate  Airway patency: patent  Cardiovascular status: acceptable  Respiratory status: acceptable  Hydration status: acceptable  PONV: controlled     Anesthetic complications: None          Last vitals:  Vitals:    05/07/19 0910 05/07/19 1521 05/07/19 1533   BP:  (!) 135/97    Resp: 16  16   Temp:  97.3  F (36.3  C)    SpO2:  100%          Electronically Signed By: Khoa Luciano MD  May 7, 2019  4:44 PM

## 2019-05-07 NOTE — ANESTHESIA POSTPROCEDURE EVALUATION
Patient: Samara Oropeza    Procedure(s):  Irrigation and debridment, left ankle    Diagnosis:abscess  Diagnosis Additional Information: Post-operative diagnosis        sp I&D left ankle  Procedure:      Procedure(s):  Irrigation and debridment, left ankle        Anesthesia Type:  General, RSI    Note:  Anesthesia Post Evaluation    Patient location during evaluation: PACU  Patient participation: Able to fully participate in evaluation  Level of consciousness: awake  Pain management: adequate  Airway patency: patent  Cardiovascular status: acceptable  Respiratory status: acceptable  Hydration status: acceptable  PONV: controlled     Anesthetic complications: None          Last vitals:  Vitals:    05/07/19 0910 05/07/19 1521 05/07/19 1533   BP:  (!) 135/97    Resp: 16  16   Temp:  97.3  F (36.3  C)    SpO2:  100%          Electronically Signed By: Khoa Luciano MD  May 7, 2019  4:46 PM

## 2019-05-07 NOTE — PROGRESS NOTES
Pt complaining of itching. Dr Naranjo made aware and bendryl 25mgs ordered with repeat if necessary.

## 2019-05-07 NOTE — CONSULTS
Consult Date:  05/07/2019      INFECTIOUS DISEASE CONSULTATION      REQUESTING PHYSICIAN:  Dr. Johnson.      IMPRESSION:   1.  Healthy 23-year-old female with ankle hardware surgery in January, then postop March deep infection, status post hardware removal with methicillin-sensitive Staph aureus in the cultures, extended 6-week course of IV antibiotics with seeming resolution, but now with drainage and possible relapsed deep infection, still on antibiotics currently.   2.  Acute renal failure from vancomycin previously.  Repeat creatinine 2.13, now down to normal.   3.  PENICILLIN AND CEPHALOSPORIN ALLERGIES.      RECOMMENDATIONS:   1.  Continue daptomycin.   2.  Await current operative findings to direct the further antibiotic course, presumably further extended daptomycin has been tolerating without difficulty.      This 23-year-old female is seen in consultation due to a complicated left foot infection.  The patient is otherwise healthy, but had chronic foot abnormalities and underwent a hardware repair in January,  postop from this about 10 weeks later developed redness, drainage and had deep hardware-related infection.  She was able to have the hardware removed and cultures at that time grew methicillin-sensitive Staph aureus.  She, under the direction of the [facility], was on an extended course of antibiotics, initially vancomycin based on her ALLERGIES TO BOTH PENICILLIN AND CEPHALOSPORINS.  On vancomycin, she developed acute renal insufficiency and was switched over to daptomycin.  She was about to complete the daptomycin recently when suddenly increased drainage occurred from the wound.  Based on this, the dapto was extended out and now imaging shows a collection and she is undergoing operative intervention.      PAST MEDICAL HISTORY:  Quite unremarkable otherwise.  Prior to this, healthy person without major medical problems.      SOCIAL AND FAMILY HISTORY:  No recent travels or exposures.  Has had  health care contact, so if new infection conceivable risk.      REVIEW OF SYSTEMS:  Mild pain.  No major fevers, chills, sweats or systemic symptoms.  No other focal symptoms.      PHYSICAL EXAMINATION:   GENERAL:  The patient appears her stated age, does not look toxic or ill.   VITAL SIGNS:  Currently normal, including being afebrile.   HEENT:  No thrush or intraoral lesions.  Pupils reactive.   NECK:  Supple and nontender.   HEART AND LUNGS:  Unremarkable.   ABDOMEN:  Soft and nontender.   EXTREMITIES:  The foot not particularly red looking.  There is a pinpoint drainage area where slight drainage is occurring from the wound.      LABORATORY DATA:  CRP had actually gone all the way down to a normal sedimentation rate as of  was down to 8.  White count is normal.  Prior cultures with sensitive Staph aureus only.  Creatinine currently normal.      Thank you very much for consultation.  I will follow the patient with you.         ANIA HOLBROOK MD             D: 2019   T: 2019   MT: AS      Name:     LUCINA BAH   MRN:      [id number]        Account:       [id number]   :      1995           Consult Date:  2019      Document: [id number]       cc: Sonja STEVE, SHAKA Johnson DPM

## 2019-05-07 NOTE — PROGRESS NOTES
This is a recent snapshot of the patient's [facility] medical record.  For current drug dose and complete information and questions, call [phone] or In Basket pool, [facility] home infusion ([id number])  CSN Number:  [id number]

## 2019-05-07 NOTE — CONSULTS
ID consult dictated IMP 1 24 yo female MSSA deep hardware L ankle infection , already hardware out, I and D , over 6 weeks IV, continued drainage    REc await op findings/cx, dapto for now   DISPLAY PLAN FREE TEXT DISPLAY PLAN FREE TEXT DISPLAY PLAN FREE TEXT

## 2019-05-07 NOTE — PROGRESS NOTES
No medication administered, awaiting for med rec according to pharmacist. Says she did giver herself a dose of her daily antibiotic at home with the help of her boyfriend. No drainage noted on left ankle, dressing CDI. Awaiting for MRI, paperwork filled. Pt states she will not be able to remove one piercing, note made on checklist. NPO after midnight for a possible surgery tomorrow. Will keep monitoring.

## 2019-05-07 NOTE — CONSULTS
"Care Transitions Team: Following for CC, discharge planning, and disposition.      Per chart review pt is identified as ELEVATED ILYA score with hx of Ankle injury and Surgery 1/2019 , rtn on 3/2019 for infection and hardware removal.  She discharged home a this time with [facility] and is currently open with them still.   Discussed noted OR time for today, explained CTS to follow along in collaboration with Care Team on discharge planning needs .       Pt explains that she has been ambulatory with her \"boot,\" however she does have a Knee Scooter from [facility], \"It is actually due back today.\"     Pt currently lives with her boyfriends who is supportive of her, she also notes that she see's a therapist as this injury has caused her to have to w/d from College, quit her job, etc. She does not identify any concerns in regards to this.     CTS called and left VM message for [facility] to explain potential extension and required documenting if so, awaiting call back.     CM following   "

## 2019-05-07 NOTE — PHARMACY-ADMISSION MEDICATION HISTORY
Admission medication history interview status for this patient is complete. See [facility] admission navigator for allergy information, prior to admission medications and immunization status.     Medication history interview source(s):Patient  Medication history resources (including written lists, pill bottles, clinic record): Care everywhere, refill history  Primary pharmacy:[facility]     Changes made to PTA medication list:  Added: none  Deleted: heparin lock flush x1 dose, macorobid (per patient completed in January), duplicate sprintec  Changed: betamethasone to PRN, clobetasol to PRN, colchicine from 0.3 mg daily to 0.6 mg twice daily    Actions taken by pharmacist (provider contacted, etc):None     Additional medication history information:None    Medication reconciliation/reorder completed by provider prior to medication history? N/A    Do you take OTC medications (eg tylenol, ibuprofen, fish oil, eye/ear drops, etc)? Y (Y/N)    For patients on insulin therapy: N (Y/N)    Prior to Admission medications    Medication Sig Last Dose Taking? Auth Provider   amitriptyline (ELAVIL) 25 MG tablet Take 1 tablet (25 mg) by mouth At Bedtime 5/6/2019 at pm Yes Sonja Abreu APRN CNP   artificial tears OINT ophthalmic ointment 0.5 inch strip each eye at night 5/6/2019 at pm Yes Umer Heaton MD   bisacodyl (DULCOLAX) 5 MG EC tablet Take 2 tablets (10 mg) by mouth daily as needed for constipation 5/5/2019 at unknown time Yes Rm Aaron MD   citalopram (CELEXA) 10 MG tablet Take 1 tablet (10 mg) by mouth daily 5/7/2019 at am Yes Sonja Abreu APRN CNP   colchicine (COLCYRS) 0.6 MG tablet Take 0.6 mg by mouth 2 times daily 5/7/2019 at am Yes Unknown, Entered By History   cyproheptadine (PERIACTIN) 4 MG tablet Take 1 tablet (4 mg) by mouth At Bedtime For nightmares 5/5/2019 at pm Yes Sonja Abreu APRN CNP   DAPTOmycin 350 MG SOLR Inject 400 mg into the vein daily 5/6/2019 at 1600  Yes Blayne Alexandre MD   dicyclomine (BENTYL) 20 MG tablet Take 1 tablet (20 mg) by mouth 4 times daily as needed 5/5/2019 at unknown time Yes Sonja Abreu APRN CNP   diphenhydrAMINE (BENADRYL) 25 MG tablet Take 1 tablet (25 mg) by mouth 3 times daily as needed (itching) Past Week at Unknown time Yes Duane Ma MD   EPINEPHrine (EPIPEN 2-EAMON) 0.3 MG/0.3ML injection Inject 0.3 mLs (0.3 mg) into the muscle as needed for anaphylaxis  Yes Sonja Abreu APRN CNP   gabapentin (NEURONTIN) 100 MG capsule Take 1 capsule (100 mg) by mouth 3 times daily as needed (anxiety) 5/6/2019 at am Yes Sonja Abreu APRN CNP   guanFACINE (TENEX) 1 MG tablet Take 3 tablets (3 mg) by mouth twice daily in the morning and evening. 5/7/2019 at am Yes Sonja Abreu APRN CNP   hyoscyamine (ANASPAZ/LEVSIN) 0.125 MG tablet Take 1-2 tablets (125-250 mcg) by mouth every 4 hours as needed for cramping 5/5/2019 at unknown time Yes Sonja Abreu APRN CNP   lactulose 20 GM/30ML SOLN Take 30 mLs by mouth 3 times daily as needed (for constipation) Past Month at Unknown time Yes Sonja Abreu APRN CNP   linaclotide (LINZESS) 290 MCG capsule Take 1 capsule (290 mcg) by mouth every morning (before breakfast) 5/5/2019 at am Yes Sandhya Encarnacion PA-C   LORazepam (ATIVAN) 0.5 MG tablet Take 1 tablet (0.5 mg) by mouth every 6 hours as needed for anxiety Past Month at Unknown time Yes Sonja Abreu APRN CNP   Magic Mouthwash (FV std formula) lidocaine visc 2% 2.5mL/5mL & maalox/mylanta w/ simeth 2.5mL/5mL & diphenhydrAMINE 5mg/5mL Swish and swallow 10 mLs in mouth every 6 hours as needed for mouth sores Past Month at Unknown time Yes Austen Marquez MD   omeprazole (PRILOSEC) 40 MG capsule Take 1 capsule (40 mg) by mouth daily Past Month at Unknown time Yes Sonja Abreu APRN CNP   ondansetron (ZOFRAN-ODT) 8 MG ODT tab Take 1 tablet (8 mg) by mouth every 8 hours as needed  for nausea 5/5/2019 at unknown time Yes Sonja Abreu APRN CNP   polyethylene glycol (MIRALAX/GLYCOLAX) powder Take 1 capful by mouth 3 times daily 5/5/2019 at unknown time Yes Reported, Patient   sennosides (SENOKOT) 8.6 MG tablet Take 3 tablets by mouth 2 times daily Past Week at Unknown time Yes Duane Ma MD   SPRINTEC 28 0.25-35 MG-MCG tablet Take one tablet by mouth daily. Take continuously. 5/6/2019 at am Yes Sonja Abreu APRN CNP   sucralfate (CARAFATE) 1 GM/10ML suspension Take 10 mLs (1 g) by mouth 4 times daily 5/5/2019 at unknown time Yes Sonja Abreu APRN CNP   albuterol (PROAIR HFA/PROVENTIL HFA/VENTOLIN HFA) 108 (90 BASE) MCG/ACT Inhaler Inhale 2 puffs into the lungs every 6 hours as needed for shortness of breath / dyspnea or wheezing More than a month at Unknown time  Kacie Almeida APRN CNP   aspirin (ASPIRIN CHILDRENS) 81 MG chewable tablet Take 81 mg by mouth as needed  Unknown at Unknown time  Reported, Patient   benzocaine (TOPICALE XTRA) 20 % GEL Apply as needed locally to mouth or nasal ulcers for pain; 4 times daily as needed More than a month at Unknown time  Sonja Abreu APRN CNP   betamethasone valerate (VALISONE) 0.1 % cream Apply topically 2 times daily as needed  Unknown at Unknown time  Reported, Patient   clobetasol (TEMOVATE) 0.05 % cream Apply topically 2 times daily Unknown at Unknown time  Esau Elliott MD   hydrocortisone 1 % CREA cream Place rectally 2 times daily as needed for itching Unknown at Unknown time  Sonja Abreu APRN CNP   hypromellose (GENTEAL) 0.3 % SOLN 1 drop every hour as needed for dry eyes  Unknown at Unknown time  Reported, Patient   lidocaine (LMX4) 4 % external cream Apply topically once as needed for mild pain Unknown at Unknown time  Reported, Patient   order for DME Roll-A-Bout Walker. Patient can use for 3 months   Andres Johnson DPM   order for DME Equipment being ordered: size 8 1/2  walking boot tall   Andres Johnson DPM   order for DME Equipment being ordered: [employer] knee scooter   Andres Johnson DPM   order for DME Equipment being ordered: adult pair of crutches   Andres Johnson DPM   sodium chloride 0.9% SOLN BOLUS Inject 1,000 mLs into the vein daily as needed (IV abx and flushes)   Rm Aaron MD

## 2019-05-07 NOTE — PROGRESS NOTES
Arrived to room (628) from home at (1800) ambulated to her room along with her . Alert and oriented x 4, oriented to room and call system, dressing CDI, CMS intact, PICC patent, saline locked. Rates pain (2)/10, reviewed welcome folder and pain/medication information packet with patient and (). Regular diet but decided to order from outside. Vitas in chart stable. Will keep monitoring.

## 2019-05-07 NOTE — H&P
"Admitted:     05/06/2019      CHIEF COMPLAINT:  Drainage from left ankle with previously known infection.  Direct admission at the request of Podiatry with plans for MRI this evening and incision and drainage of left ankle tomorrow.      HISTORY OF PRESENT ILLNESS:  Ms. Oropeza is a 23-year-old female with a fairly complex medical history in light of her age.  She has a history of laxity of her left ankle.  She reports she underwent a left ankle fusion in 01/2019.  This was complicated by postoperative infection.  The patient had her hardware removed on 3/12/2019.  She was treated with IV vancomycin initially, but this was complicated by development of renal failure.  She has since been on daptomycin for the past 4 weeks.  It sounds like culture results grew MSSA.  The patient has a PICC line in place for which she receives her antibiotics.  This was replaced yesterday after the PICC line had fallen out the day prior.      The patient has been having some bloody drainage from her left foot since this past Thursday, approximately 5 days ago.  She has noticed some \"white clumpy material\" along with bloody discharge from a small opening of her left foot.  This is new.  She otherwise has had no symptoms of concern.      Recent labs notable for CRP level is less than 2.9 and ESR that was within the past week.  The patient is followed by Podiatry.  They requested direct admission to the hospital this evening for obtaining an MRI scan and plans for incision and drainage tomorrow given drainage from her left ankle.      Of note, her most recent wound culture performed 5/2/2019,  is without growth currently.      PAST MEDICAL HISTORY:   1.  History of left ankle surgery, 01/2019 complicated by infection.  Cultures grew out MSSA.  Hardware removed.  3/12/2019.  Course complicated by renal failure developed from IV vancomycin.  She was changed to IV daptomycin, which she has been using approximately for the past 4 weeks.   2.  " "Behcet syndrome.  Symptoms controlled with Colchicine.   3.  Acute renal failure secondary to vancomycin as mentioned above.   4.  Tourette syndrome.   5.  \"Seizure-like activity\" according to the patient.  Denies pseudoseizure history.  She is not on any anticonvulsant medications currently.   6.  Gastroparesis.   7.  Fibromyalgia.   8.  Rheumatoid arthritis per patient, although not being treated for this currently.  She states she follows with rheumatologist.      CURRENT MEDICATIONS:  Await pharmacy reconciliation medication list.  Medication list appears to include the following medicines:   1.  Amitriptyline.   2.  Artificial tears.   3.  Aspirin.   4.  Betamethasone cream.   5.  Clobetasol cream.   6. .   7.  Bentyl.   8.  Diphenhydramine.   9.  Gabapentin.   10.  Guanfacine .   11.  Hydrocortisone.   12.  Hyoscyamine.   13. .    14.  Lactulose for constipation.   15.  Linzess.   16.  Magic mouthwash.   17.  Nitrofurantoin.   18.  Birth control pill.   19.  Zofran.   20.  Sprintec.   21.  Albuterol.   22.  Citalopram.   23.  Colchicine.   24.  Daptomycin.   25.  Lorazepam.   26.  Omeprazole.   27.  MiraLax.   28.  Senokot.   29.  Sucralfate.      Above medication list may change when clarified further by pharmacy.      ALLERGIES:  MANY INCLUDING AMOXICILLIN, BEE VENOM, CONTRAST DYE, CITRUS FRUIT, REGLAN, ACE INHIBITORS, AMITIZA, MIDAZOLAM, TIZANIDINE, VERSED, ADHESIVE TAPE, AZITHROMYCIN, KEFLEX.      FAMILY HISTORY:  Reviewed.  Nothing contributory to this admission.      SOCIAL HISTORY:  The patient uses alcohol rarely.  Does not smoke.  Does not use drugs.  She has been on a medical leave for the past year from school.  She does not work.  She lives with her boyfriend.      REVIEW OF SYSTEMS:  See HPI for details.  Comprehensive greater than 10-point review of systems is otherwise negative besides that detailed above.      PHYSICAL EXAMINATION:   VITAL SIGNS:  Blood pressure is currently 130/75 with a heart " rate of 90, afebrile, saturation 99% on room air.   GENERAL:  The patient appears nontoxic and in no acute distress.  When I entered the room, she was watching TV and appeared to be comfortable.   HEENT:  Head is atraumatic.  Sclerae are white.  Eyelids are normal.  Conjunctivae are normal.  Extraocular movements are intact.   NECK:  Supple.  No cervical or supraclavicular lymphadenopathy.   HEART:  Regular rate and rhythm.  No significant murmurs.  No lower extremity edema.   LUNGS:  Clear to auscultation bilaterally.  No intercostal retractions, no conversational dyspnea.   ABDOMEN:  Nontender.  Soft.  No masses.  No organomegaly.   EXTREMITIES:  No edema.   SKIN:  Reveals no rash.  No jaundice.  Skin is dry to touch.   NEUROLOGIC:  Cranial nerves II-XII are intact.  Moves all extremities appropriately.  Sensation intact to light touch in upper and lower extremities bilaterally.   PSYCHIATRIC:  The patient awake, alert and oriented x3.  Mood and affect are normal and appropriate.      LABORATORY AND  IMAGING DATA:  Reviewed above in HPI.      IMPRESSION AND PLAN:  Ms. Oropeza is a 23-year-old female with a very complicated medical history for her age, which includes a previous left ankle surgery in 01/2019 complicated by infection with hardware removal 03/2019.  She has an ongoing concern about infection in this ankle is currently on IV daptomycin for the past at least 1 month.  She reports drainage out of her left ankle for the past 4-5 days.  Podiatry is requesting admission to the Hospitalist Service with plans for an MRI overnight and incision and drainage of left ankle tomorrow.  She does not appear septic or toxic.   1.  Left ankle infection with new drainage.  Concern for progressive infection and possible osteomyelitis.   2.  History of left ankle infection with previous cultures growing out MSSA.  Hardware removed 03/2019.  has a PICC line in place for outpatient IV antibiotics.  Continue daptomycin for  now.   3.  History of Behcet syndrome, controlled with colchicine .   4.  History of renal failure related to vancomycin use.   5.  History of Tourette syndrome.   6.  History of seizure-like activity per patient.  Diagnosis a bit unclear here.   7.  Gastroparesis history.   8.  Fibromyalgia history.   9.  Rheumatoid arthritis history.  Per patient, currently on no medications for this and reportedly follows with a rheumatologist.      PLAN:   1.  Admit to inpatient service.  Anticipate greater than 2 midnights in the hospital.   2.  Podiatry consultation with anticipated incision and drainage in the morning.   3.  Left ankle MRI as requested by Podiatry.   4.  Infection Disease consultation as requested by Podiatry.   5.  N.p.o. after midnight for anticipated surgery tomorrow morning.   6.  Continue daptomycin for now.   7.  Home medications reordered when clarified by pharmacy.         JANELLE WOODS MD             D: 2019   T: 2019   MT: AS      Name:     LUCINA BAH   MRN:      [id number]        Account:      [id number]   :      1995        Admitted:     2019                   Document: [id number]       cc: Sonja STEVE, CNP

## 2019-05-07 NOTE — ANESTHESIA PREPROCEDURE EVALUATION
Anesthesia Pre-Procedure Evaluation    Patient: Samara Oropeza   MRN: [id number] : 1995          Preoperative Diagnosis: abscess    Procedure(s):  Irrigation and debridment, left ankle    Past Medical History:   Diagnosis Date     Anemia      Anxiety      Arthritis      Behcet's disease (H)      Cervical adenitis May 2010     Chronic abdominal pain      Constipation, chronic      Fibromyalgia      Gastro-oesophageal reflux disease      Gastroparesis      Irregular heart beat     tachycardia, has had workup     Migraines      Neuromuscular disorder (H)     fibramyalgia     Palpitations      Seizure (H)      Seizures (H)     unknown etiology     Syncope      Tourette's      Past Surgical History:   Procedure Laterality Date     ARTHROSCOPY ANKLE, REPAIR LIGAMENT Left 2019    Procedure: Ankle arthroscopy and sinus tarsi evacuation, ligament repair, left lower extremity;  Surgeon: Andres Johnson DPM;  Location:  OR     ARTHROSCOPY KNEE WITH PATELLAR REALIGNMENT  2013    Procedure: ARTHROSCOPY KNEE WITH PATELLAR REALIGNMENT;  Left Knee Arthroscopy, Medial Patellofemoral Ligament Reconstruction with Allograft  ;  Surgeon: Jennifer Acevedo MD;  Location: [facility]     COLONOSCOPY       DENTAL SURGERY      Teeth removal     ENDOSCOPY UPPER, COLONOSCOPY, COMBINED       HC ESOPH/GAS REFLUX TEST W NASAL IMPED >1 HR N/A 2/15/2017    Procedure: ESOPHAGEAL IMPEDENCE FUNCTION TEST WITH 24 HOUR PH GREATER THAN 1 HOUR;  Surgeon: Timothy Matta MD;  Location: [facility] GI     IR PICC PLACEMENT > 5 YRS OF AGE  3/13/2019     IRRIGATION AND DEBRIDEMENT FOOT, COMBINED Left 3/12/2019    Procedure: COMBINED IRRIGATION AND DEBRIDEMENT LEFT ANKLE;  Surgeon: Micha Glover MD;  Location: [facility] OR     PICC INSERTION Left 2019    4Fr - 45cm (5cm external), Basilic vein, SVC RA junction     Anesthesia Evaluation     . Pt has had prior anesthetic.            ROS/MED  "HX    ENT/Pulmonary:  - neg pulmonary ROS   (+)asthma , . .    Neurologic:     (+)seizures     Cardiovascular:         METS/Exercise Tolerance:     Hematologic:         Musculoskeletal:   (+) arthritis,  -       GI/Hepatic:     (+) GERD       Renal/Genitourinary:         Endo:         Psychiatric:         Infectious Disease:   (+) Other Infectious Disease foot infection      Malignancy:         Other: Comment: Behcet's disease                         Physical Exam      Airway   Mallampati: II  TM distance: >3 FB  Neck ROM: full    Dental     Cardiovascular   Rhythm and rate: regular and normal      Pulmonary    breath sounds clear to auscultation            Lab Results   Component Value Date    WBC 4.6 05/07/2019    HGB 10.0 (L) 05/07/2019    HCT 30.5 (L) 05/07/2019     05/07/2019    CRP <2.9 05/02/2019    SED 8 05/02/2019     05/07/2019    POTASSIUM 3.8 05/07/2019    CHLORIDE 108 05/07/2019    CO2 26 05/07/2019    BUN 11 05/07/2019    CR 0.63 05/07/2019     (H) 05/07/2019    SHANKAR 8.3 (L) 05/07/2019    MAG 1.9 04/01/2019    ALBUMIN 3.2 (L) 04/26/2019    PROTTOTAL 6.4 (L) 04/26/2019    ALT 19 04/30/2019    AST 14 04/30/2019    GGT 34 (H) 10/19/2013    ALKPHOS 84 04/26/2019    BILITOTAL 0.2 04/26/2019    LIPASE 106 03/30/2019    AMYLASE 63 01/31/2007    INR 0.99 11/06/2016    TSH 0.53 03/21/2019    T4 1.26 01/31/2007    HCG Negative 05/07/2019    HCGS Negative 06/02/2017       Preop Vitals  BP Readings from Last 3 Encounters:   05/07/19 115/75   05/05/19 (!) 123/92   05/04/19 126/87    Pulse Readings from Last 3 Encounters:   05/05/19 105   05/04/19 80   05/03/19 99      Resp Readings from Last 3 Encounters:   05/07/19 16   05/06/19 12   05/05/19 16    SpO2 Readings from Last 3 Encounters:   05/07/19 99%   05/05/19 100%   05/04/19 100%      Temp Readings from Last 1 Encounters:   05/07/19 97.5  F (36.4  C) (Oral)    Ht Readings from Last 1 Encounters:   05/06/19 1.6 m (5' 3\")      Wt Readings from " "Last 1 Encounters:   05/06/19 70.7 kg (155 lb 14.4 oz)    Estimated body mass index is 27.62 kg/m  as calculated from the following:    Height as of an earlier encounter on 5/6/19: 1.6 m (5' 3\").    Weight as of this encounter: 70.7 kg (155 lb 14.4 oz).       Anesthesia Plan      History & Physical Review  History and physical reviewed and following examination; no interval change.    ASA Status:  3 .    NPO Status:  > 8 hours    Plan for General and LMA with Intravenous and Propofol induction. Maintenance will be Balanced.    PONV prophylaxis:  Ondansetron (or other 5HT-3)       Postoperative Care  Postoperative pain management:  Oral pain medications, Multi-modal analgesia and IV analgesics.      Consents  Anesthetic plan, risks, benefits and alternatives discussed with:  Patient..                 Khoa Luciano MD                    .  "

## 2019-05-07 NOTE — PLAN OF CARE
Pt up independently. Pain minimal and declined the need for medication. LS cl, BS active, passing flatus. LBM 5/6/19. CMS - dorsal foot numbness. Bandaid to lateral L ankle has small drainage noted. PICC - single lumen. Meds continued to be reviewed. MRI for today and NPO for surgery at 1715 with Dr. Gamez.

## 2019-05-07 NOTE — BRIEF OP NOTE
[facility]    Brief Operative Note    Pre-operative diagnosis: abscess  Post-operative diagnosis sp I&D left ankle  Procedure: Procedure(s):  Irrigation and debridment, left ankle  Surgeon: Surgeon(s) and Role:     * Andres Johnson DPM - Primary  Anesthesia: General   Estimated blood loss: Less than 10 ml  Drains: None  Specimens:   ID Type Source Tests Collected by Time Destination   1 : Left Ankle Wound Culture Fluid Ankle ANAEROBIC BACTERIAL CULTURE, FLUID CULTURE AEROBIC BACTERIAL, GRAM STAIN Andres Johnson DPM 5/7/2019  4:14 PM      Findings:   Excision of non-healing wound anterolateral ankle; no evidence of deep infection noted: no purulence, necrosis of tissue.  Bone appeared healthy and viable.  Complications: None.  Implants:  * No implants in log *

## 2019-05-07 NOTE — PROGRESS NOTES
Foot & Ankle Surgery  May 7, 2019    Patient admission from clinic for new wound/drainage from lateral left ankle incision.  History of deep infection requiring IV abx via PICC.     MRI pending.  NPO after breakfast for I&D left ankle 5:15PM today    Andres Johnson DPM FACFAS FACFAOM  Podiatric Foot & Ankle Surgeon  [facility]  [phone]

## 2019-05-07 NOTE — PROGRESS NOTES
Report given to JESSY Martell down in SDS.  They will be coming up to get pt at 1500 for surgery at 1615

## 2019-05-08 LAB
GLUCOSE BLDC GLUCOMTR-MCNC: 121 MG/DL (ref 70–99)
GRAM STN SPEC: ABNORMAL
Lab: ABNORMAL
SPECIMEN SOURCE: ABNORMAL

## 2019-05-08 PROCEDURE — 25000132 ZZH RX MED GY IP 250 OP 250 PS 637: Performed by: PODIATRIST

## 2019-05-08 PROCEDURE — 25800030 ZZH RX IP 258 OP 636: Performed by: PODIATRIST

## 2019-05-08 PROCEDURE — 25000132 ZZH RX MED GY IP 250 OP 250 PS 637: Performed by: INTERNAL MEDICINE

## 2019-05-08 PROCEDURE — 25000132 ZZH RX MED GY IP 250 OP 250 PS 637: Performed by: HOSPITALIST

## 2019-05-08 PROCEDURE — 99232 SBSQ HOSP IP/OBS MODERATE 35: CPT | Performed by: HOSPITALIST

## 2019-05-08 PROCEDURE — 25000128 H RX IP 250 OP 636: Performed by: PODIATRIST

## 2019-05-08 PROCEDURE — 25000131 ZZH RX MED GY IP 250 OP 636 PS 637: Performed by: PODIATRIST

## 2019-05-08 PROCEDURE — 12000000 ZZH R&B MED SURG/OB

## 2019-05-08 PROCEDURE — 00000146 ZZHCL STATISTIC GLUCOSE BY METER IP

## 2019-05-08 RX ORDER — GABAPENTIN 100 MG/1
100 CAPSULE ORAL 3 TIMES DAILY
Status: DISCONTINUED | OUTPATIENT
Start: 2019-05-08 | End: 2019-05-10 | Stop reason: HOSPADM

## 2019-05-08 RX ORDER — COLCHICINE 0.6 MG/1
0.6 TABLET ORAL 2 TIMES DAILY
Status: DISCONTINUED | OUTPATIENT
Start: 2019-05-08 | End: 2019-05-10 | Stop reason: HOSPADM

## 2019-05-08 RX ADMIN — COLCHICINE 0.6 MG: 0.6 TABLET, FILM COATED ORAL at 22:14

## 2019-05-08 RX ADMIN — NALOXEGOL OXALATE 25 MG: 25 TABLET, FILM COATED ORAL at 17:25

## 2019-05-08 RX ADMIN — ACETAMINOPHEN 975 MG: 325 TABLET, FILM COATED ORAL at 11:26

## 2019-05-08 RX ADMIN — OXYCODONE HYDROCHLORIDE 5 MG: 5 TABLET ORAL at 07:37

## 2019-05-08 RX ADMIN — POLYETHYLENE GLYCOL 3350 17 G: 17 POWDER, FOR SOLUTION ORAL at 14:02

## 2019-05-08 RX ADMIN — CITALOPRAM HYDROBROMIDE 10 MG: 10 TABLET ORAL at 08:45

## 2019-05-08 RX ADMIN — GABAPENTIN 100 MG: 100 CAPSULE ORAL at 09:57

## 2019-05-08 RX ADMIN — OXYCODONE HYDROCHLORIDE 5 MG: 5 TABLET ORAL at 08:44

## 2019-05-08 RX ADMIN — SUCRALFATE 1 G: 1 SUSPENSION ORAL at 12:30

## 2019-05-08 RX ADMIN — ACETAMINOPHEN 650 MG: 325 TABLET, FILM COATED ORAL at 08:45

## 2019-05-08 RX ADMIN — OXYCODONE HYDROCHLORIDE 5 MG: 5 TABLET ORAL at 12:30

## 2019-05-08 RX ADMIN — OXYCODONE HYDROCHLORIDE 5 MG: 5 TABLET ORAL at 15:45

## 2019-05-08 RX ADMIN — OXYCODONE HYDROCHLORIDE 5 MG: 5 TABLET ORAL at 20:00

## 2019-05-08 RX ADMIN — ONDANSETRON 4 MG: 4 TABLET, ORALLY DISINTEGRATING ORAL at 14:14

## 2019-05-08 RX ADMIN — GUANFACINE 3 MG: 1 TABLET ORAL at 08:45

## 2019-05-08 RX ADMIN — ACETAMINOPHEN 650 MG: 325 TABLET, FILM COATED ORAL at 19:59

## 2019-05-08 RX ADMIN — SENNOSIDES 3 TABLET: 8.6 TABLET, FILM COATED ORAL at 20:00

## 2019-05-08 RX ADMIN — OXYCODONE HYDROCHLORIDE 10 MG: 5 TABLET ORAL at 00:16

## 2019-05-08 RX ADMIN — SUCRALFATE 1 G: 1 SUSPENSION ORAL at 15:45

## 2019-05-08 RX ADMIN — POLYETHYLENE GLYCOL 3350 17 G: 17 POWDER, FOR SOLUTION ORAL at 00:40

## 2019-05-08 RX ADMIN — OXYCODONE HYDROCHLORIDE 10 MG: 5 TABLET ORAL at 03:24

## 2019-05-08 RX ADMIN — DIPHENHYDRAMINE HYDROCHLORIDE 25 MG: 25 CAPSULE ORAL at 20:07

## 2019-05-08 RX ADMIN — GABAPENTIN 100 MG: 100 CAPSULE ORAL at 19:59

## 2019-05-08 RX ADMIN — AMITRIPTYLINE HYDROCHLORIDE 25 MG: 25 TABLET, FILM COATED ORAL at 22:14

## 2019-05-08 RX ADMIN — DIPHENHYDRAMINE HYDROCHLORIDE 25 MG: 25 CAPSULE ORAL at 11:27

## 2019-05-08 RX ADMIN — CYPROHEPTADINE HYDROCHLORIDE 4 MG: 4 TABLET ORAL at 22:14

## 2019-05-08 RX ADMIN — Medication 0.3 MG: at 08:45

## 2019-05-08 RX ADMIN — OXYCODONE HYDROCHLORIDE 5 MG: 5 TABLET ORAL at 23:45

## 2019-05-08 RX ADMIN — DAPTOMYCIN 400 MG: 500 INJECTION, POWDER, LYOPHILIZED, FOR SOLUTION INTRAVENOUS at 23:45

## 2019-05-08 RX ADMIN — MINERAL OIL AND WHITE PETROLATUM: 150; 830 OINTMENT OPHTHALMIC at 22:14

## 2019-05-08 RX ADMIN — LINACLOTIDE 290 MCG: 145 CAPSULE, GELATIN COATED ORAL at 07:37

## 2019-05-08 RX ADMIN — SUCRALFATE 1 G: 1 SUSPENSION ORAL at 08:44

## 2019-05-08 RX ADMIN — OMEPRAZOLE 40 MG: 20 CAPSULE, DELAYED RELEASE ORAL at 08:44

## 2019-05-08 RX ADMIN — ACETAMINOPHEN 975 MG: 325 TABLET, FILM COATED ORAL at 03:25

## 2019-05-08 RX ADMIN — POLYETHYLENE GLYCOL 3350 17 G: 17 POWDER, FOR SOLUTION ORAL at 08:45

## 2019-05-08 RX ADMIN — GUANFACINE 3 MG: 1 TABLET ORAL at 20:00

## 2019-05-08 RX ADMIN — SUCRALFATE 1 G: 1 SUSPENSION ORAL at 20:01

## 2019-05-08 RX ADMIN — GABAPENTIN 100 MG: 100 CAPSULE ORAL at 14:01

## 2019-05-08 RX ADMIN — POLYETHYLENE GLYCOL 3350 17 G: 17 POWDER, FOR SOLUTION ORAL at 20:00

## 2019-05-08 ASSESSMENT — ACTIVITIES OF DAILY LIVING (ADL)
ADLS_ACUITY_SCORE: 12

## 2019-05-08 NOTE — PROGRESS NOTES
Clinic Care Coordination Contact    Situation: Patient chart reviewed by care coordinator.    Background: notified of 5-5-19 ED visit     Assessment: chart reviewed. Patient subsequently admitted and takne to OR for washout of left ankle for dehisced lateral ankle incision and recurrent vs new infection    Plan/Recommendations: will follow up with patient upon notification of discharge from hospital     Teresa Cam RN  [facility]-Care Coordination  [facility]  [phone]

## 2019-05-08 NOTE — OP NOTE
Procedure Date: 05/07/2019      SURGEON:  Andres Johnson DPM      ASSISTANT:  Gloria Galarza, PGY2.      PREOPERATIVE DIAGNOSES:     1.  Status post left ankle arthroscopy and lateral ankle stabilization 01/02/20119.   2.  Status post irrigation and debridement by outside orthopedic surgeon, left ankle after about developing infection.   3.  New wound anterolateral left ankle.      POSTOPERATIVE DIAGNOSES:   1.  Status post left ankle arthroscopy and lateral ankle stabilization 01/02/20119.   2.  Status post irrigation and debridement by outside orthopedic surgeon, left ankle after about developing infection.   3.  New wound anterolateral left ankle.      PROCEDURES:   1.  Excision of wound down to and including deep fascia, less than 20 cm2.   2.  Irrigation and debridement, left ankle.      PATHOLOGY:  Deep tissue swabs were sent off for aerobic and anaerobic cultures and Gram stain.      ANESTHESIA:  General endotracheal with ankle block.      HEMOSTASIS:  None.      ESTIMATED BLOOD LOSS:  5 mL.      MATERIALS:  None.      INJECTABLES:  15 mL of 0.5% Sensorcaine plain.      COMPLICATIONS:  None apparent.      INDICATIONS FOR PROCEDURE:  The patient is a pleasant 23-year-old female who underwent left ankle arthroscopy with open anterior talofibular ligament debridement and repair with sinus tarsi evacuation by myself in 01/2019.  Approximately 2 months out from surgery she developed a low-grade infection and was placed on clindamycin by myself.  She was later admitted to an outside hospital for worsening infection and underwent an irrigation and debridement by an orthopedic surgeon.  My initial incision was slow to heal, as was the incision after the irrigation and debridement.  She was placed on a PICC line including vancomycin, but because of acute renal failure, she was then switched to daptomycin.  She presented to my clinic yesterday for a new open area along the previous incision that was draining.  She  was admitted to the hospital.  Clinically, other than the small but deep tissue defect, the patient had little to no redness and swelling.  An admission x-ray and MRI indicated gas in the soft tissue, although suspect this is free air from the open wound.  However, the MRI did not show any evidence of an abscess or bone infection.  She was brought to the operating room today for I and D.      DESCRIPTION OF PROCEDURE:  After obtaining written consent, the patient was transferred to the operating room, placed in supine position on the operating table.  She was placed under general anesthesia.  The left lower extremity was then anesthetized with preoperative local.  She was then prepped and draped in normal aseptic fashion.  No tourniquet was utilized.      The patient, procedure, and site were correctly identified by OR staff.      Attention was directed to the anterolateral left ankle where a 3 x 1 incision was drawn out along the margins of the previous nonhealing incision.  This was carried down to underlying subcutaneous tissue and deep fascia, excising the nonhealing wound with a 15 blade and rongeur.  I was readily able to visualize the talus and a portion of the ankle joint and the subtalar joint.  There was no necrotic tissue.  There is no purulence.  I explored superiorly and inferiorly and again no necrotic tissue or purulence were identified.  We irrigated the wound with 3 liters of fluid to have them antibiotic-impregnated normal saline and one of them normal saline.  Again, no evidence of infection was noted.  The swabs were obtained prior to the wound flushing.  I elected to primarily close the incision with 3-0 nylon in combination simple interrupted and mattress sutures.      A dry sterile dressing was applied to the patient's left lower extremity.  She appeared to tolerate the procedure and anesthesia well and was transferred to the PACU with vital signs stable and vascular status intact to all  digits of the foot.  The patient will be readmitted to the floor for IV antibiotics and monitoring.         GREG CRISOSTOMO DPM             D: 2019   T: 2019   MT: SURESH      Name:     LUCINA BAH   MRN:      [id number]        Account:        [id number]   :      1995           Procedure Date: 2019      Document: [id number]

## 2019-05-08 NOTE — PLAN OF CARE
RN [id number]  Pt doing well. Pt ambulates to BR WBAT w/Cam boot. Lt ankle drsg CDI, tingling to lt toes. Pt voiding, pain controlled with oxycodone. Pt concerned about constipation & home laxatives. [facility] microlab called with left ankle drainage growing gram negative rods, Admitting MD messaged & no new orders.

## 2019-05-08 NOTE — PROGRESS NOTES
[facility]  Infectious Disease Progress Note          Assessment and Plan:   IMPRESSION:   1.  Healthy 23-year-old female with ankle hardware surgery in January, then postop March deep infection, status post hardware removal with methicillin-sensitive Staph aureus in the cultures, extended 6-week course of IV antibiotics with seeming resolution, but now with drainage and possible relapsed deep infection, still on antibiotics currently.   2.  Acute renal failure from vancomycin previously.  Repeat creatinine 2.13, now down to normal.   3.  PENICILLIN AND CEPHALOSPORIN ALLERGIES.      RECOMMENDATIONS:   1.  Continue daptomycin, but looks like this is new infection/new organism.   2.  Note operative findings , no major purulence, but gram atain + and cx now GNR  Prior vanco renal failure, PCN and ceph allergies, not septic, wait for cx to direct appropriat abx to challenge allergy, likely erta                Interval History:   no new complaints and doing well; no cp, sob, n/v/d, or abd pain. Some pain, no fever, feels Ok, cx is GNR deep              Medications:       acetaminophen  975 mg Oral Q8H     amitriptyline  25 mg Oral At Bedtime     artificial tears   Both Eyes At Bedtime     citalopram  10 mg Oral Daily     colchicine  0.3 mg Oral Daily     cyproheptadine  4 mg Oral At Bedtime     DAPTOmycin (CUBICIN) intermittent infusion  400 mg Intravenous Q24H     gabapentin  100 mg Oral TID     guanFACINE  3 mg Oral BID     linaclotide  290 mcg Oral QAM AC     omeprazole  40 mg Oral QAM     polyethylene glycol  17 g Oral TID     sennosides  3 tablet Oral BID     sodium chloride (PF)  3 mL Intracatheter Q8H     sucralfate  1 g Oral 4x Daily                  Physical Exam:   Blood pressure (!) 147/105, pulse 107, temperature 97.5  F (36.4  C), temperature source Oral, resp. rate 14, weight 70.7 kg (155 lb 14.4 oz), last menstrual period 04/13/2019, SpO2 98 %, unknown if currently  breastfeeding.  Wt Readings from Last 2 Encounters:   05/06/19 70.7 kg (155 lb 14.4 oz)   05/06/19 69.9 kg (154 lb)     Vital Signs with Ranges  Temp:  [96.3  F (35.7  C)-98.2  F (36.8  C)] 97.5  F (36.4  C)  Pulse:  [] 107  Heart Rate:  [] 94  Resp:  [10-17] 14  BP: (120-147)/() 147/105  SpO2:  [96 %-100 %] 98 %    Constitutional: Awake, alert, cooperative, no apparent distress   Lungs: Clear to auscultation bilaterally, no crackles or wheezing   Cardiovascular: Regular rate and rhythm, normal S1 and S2, and no murmur noted   Abdomen: Normal bowel sounds, soft, non-distended, non-tender   Skin: No rashes, no cyanosis, no edema foot wrapped   Other:           Data:   All microbiology laboratory data reviewed.  Recent Labs   Lab Test 05/07/19  0644 05/02/19  1638 04/30/19  1650   WBC 4.6 4.6 5.0   HGB 10.0* 10.5* 10.1*   HCT 30.5* 32.4* 30.7*   MCV 90 88 87    205 216     Recent Labs   Lab Test 05/07/19  0644 04/30/19  1650 04/26/19  2314   CR 0.63 0.78 1.02     Recent Labs   Lab Test 05/02/19  1638   SED 8     Recent Labs   Lab Test 05/07/19  1614 05/02/19  1701 03/12/19  1652 03/12/19  1646 03/09/19  1049 12/05/18  1404 10/31/18  1456 09/06/18  1345 11/29/16  1308   CULT Moderate growth  Non lactose fermenting gram negative rods  *  Culture in progress  Culture negative monitoring continues No growth No anaerobes isolated  Moderate growth  Staphylococcus aureus  * No anaerobes isolated  Moderate growth  Staphylococcus aureus  *  Light growth  Normal skin leila   Moderate growth  Staphylococcus aureus  This isolate DOES NOT demonstrate inducible clindamycin resistance in vitro. Clindamycin   is susceptible and could be used when indicated, however, erythromycin is resistant and   should not be used.  *  Moderate growth  Coagulase negative Staphylococcus  Susceptibility testing not routinely done  * >100,000 colonies/mL  Staphylococcus saprophyticus  * No growth 50,000 to 100,000  colonies/mL  Staphylococcus aureus  *  50,000 to 100,000 colonies/mL  Coagulase negative Staphylococcus  * No Beta Streptococcus isolated

## 2019-05-08 NOTE — PROGRESS NOTES
This is a recent snapshot of the patient's [facility] medical record.  For current drug dose and complete information and questions, call [phone] or In Basket pool, [facility] home [facility] ([id number])  CSN Number:  [id number]

## 2019-05-08 NOTE — PROGRESS NOTES
Foot & Ankle Surgery  May 8, 2019    Patient seen at bedside POD#1 sp washout left ankle.  Pain elevated at surgical site.  Some chest pain last night, some itching currently, controlled with benadryl    BP (!) 141/93 (BP Location: Left arm)   Pulse 107   Temp 98.2  F (36.8  C) (Oral)   Resp 14   Wt 70.7 kg (155 lb 14.4 oz)   LMP 04/13/2019   SpO2 98%   BMI 27.62 kg/m      PE - sutures intact, skin margins well coapted.  No necrosis, dehiscence.  Mild surrounding inflammation, wnl for this stage post-op.    Gram stain -   Gram Stain Abnormal  05/07/2019  4:14    Rare   Gram negative rods      Cultures -   Culture Micro Abnormal  05/07/2019  4:14    Moderate growth   Non lactose fermenting gram negative rods      A/P - 22 yo female POD#1 sp washout left ankle for dehisced lateral ankle incision and recurrent/new infection  -dressing change today  -WBAT in walking boot, will order short boot as tall does not fit well  -Gram and culture swabs obtained prior to 3L washout of ankle.  Based on pre-op MRI and and clean intra-op presentation, no further surgery planned.  -abx plan per ID; unless contra-indicated, would request IV abx via PICC.    -Dr Penny to follow up tomorrow.    Andres Johnson, CHRISTINAM FACFAS FACFAOM  Podiatric Foot & Ankle Surgeon  [facility]  [phone]

## 2019-05-08 NOTE — PROGRESS NOTES
[facility]    Medicine Progress Note - Hospitalist Service       Date of Admission:  5/6/2019  Assessment & Plan       Ms. Oropeza is a 23-year-old female with a very complicated medical history for her age, which includes a previous left ankle surgery in 01/2019 complicated by infection with hardware removal 03/2019.  She has an ongoing concern about infection in this ankle is currently on IV daptomycin for the past at least 1 month.  She reports drainage out of her left ankle for the past 4-5 days.  Podiatry is requesting admission to the Hospitalist Service with plans for an MRI overnight and incision and drainage of left ankle tomorrow.  She does not appear septic or toxic.   1.  Left ankle infection with new drainage.  Concern for progressive infection and possible osteomyelitis.   2.  History of left ankle infection with previous cultures growing out MSSA.  Hardware removed 03/2019.  has a PICC line in place for outpatient IV antibiotics.  Continue daptomycin for now.   3.  History of Behcet syndrome, controlled with colchicine .   4.  History of renal failure related to vancomycin use.   5.  History of Tourette syndrome.   6.  History of seizure-like activity per patient.  Diagnosis a bit unclear here.   7.  Gastroparesis history.   8.  Fibromyalgia history.   9.  Rheumatoid arthritis history.  Per patient, currently on no medications for this and reportedly follows with a rheumatologist.      PLAN:   1.  Continue inpatient service.   2.  Podiatry did incision and drainage in the morning.   3.  Left ankle MRI done as requested by Podiatry showed 2 cm gas collection in the subcutaneous tissues anterior to the lateral malleolus without abscess or osteomyelitis.  4.  Infection Disease to determine antibiotic plan for apparently new organism, should have ID and sensitivity soon..   5.    Continue daptomycin for now.   6.  AM Labs  7..  Add Movantik for constipation.  9.  Renewed script for knee  hankter.  10.  Restarted gabapentin.    Diet: Advance Diet as Tolerated: Regular Diet Adult    DVT Prophylaxis: Pneumatic Compression Devices, Defer to primary service and add LMWH if here beyond tomorrow and okay with Podiatry.  Wilson Catheter: not present  Code Status: Full Code      Disposition Plan   Expected discharge: 2 - 3 days, recommended to prior living arrangement once antibiotic plan established.  Entered: Stevie Cam MD 05/08/2019, 3:28 PM       The patient's care was discussed with the Bedside Nurse, Patient and Patient's Family.    Stevie Cam MD  Hospitalist Service  [facility]    ______________________________________________________________________    Interval History   Patient doing okay.  Left foot is numb.  Constipated.  No rash, itch.  Sleeping okay.  Pain control an issue.  Has a relatively new PICC line in place.    Data reviewed today: I reviewed all medications, new labs and imaging results over the last 24 hours. I personally reviewed no images or EKG's today.    Physical Exam   Vital Signs: Temp: 98.2  F (36.8  C) Temp src: Oral BP: 120/80 Pulse: 107 Heart Rate: 102 Resp: 14 SpO2: 98 % O2 Device: None (Room air)    Weight: 155 lbs 14.4 oz  Constitutional: awake, alert, cooperative, no apparent distress, and appears stated age  Eyes: Lids and lashes normal, pupils equal, round and reactive to light, extra ocular muscles intact, sclera clear, conjunctiva normal  Hematologic / Lymphatic: no cervical lymphadenopathy  Respiratory: No increased work of breathing, good air exchange, clear to auscultation bilaterally, no crackles or wheezing  Cardiovascular: Normal apical impulse, regular rate and rhythm, normal S1 and S2, no S3 or S4, and no murmur noted  GI: No scars, normal bowel sounds, soft, non-distended, non-tender, no masses palpated, no hepatosplenomegally  Musculoskeletal: There is no redness, warmth, or swelling of the joints.  Full range of  motion noted.  Motor strength is 5 out of 5 all extremities bilaterally.  Tone is normal.  Neurologic: Awake, alert, oriented to name, place and time.  Cranial nerves II-XII are grossly intact.  Motor is 5 out of 5 bilaterally.  Cerebellar finger to nose, heel to shin intact.  Sensory is intact.  Babinski down going, Romberg negative, and gait is normal.  Neuropsychiatric: General: normal, calm and normal eye contact    Data   Recent Labs   Lab 05/07/19  0644 05/02/19  1638   WBC 4.6 4.6   HGB 10.0* 10.5*   MCV 90 88    205     --    POTASSIUM 3.8  --    CHLORIDE 108  --    CO2 26  --    BUN 11  --    CR 0.63  --    ANIONGAP 5  --    SHANKAR 8.3*  --    *  --      No results found for this or any previous visit (from the past 24 hour(s)).

## 2019-05-09 ENCOUNTER — MEDICAL CORRESPONDENCE (OUTPATIENT)
Dept: HEALTH INFORMATION MANAGEMENT | Facility: CLINIC | Age: 24
End: 2019-05-09

## 2019-05-09 ENCOUNTER — DOCUMENTATION ONLY (OUTPATIENT)
Dept: GASTROENTEROLOGY | Facility: CLINIC | Age: 24
End: 2019-05-09

## 2019-05-09 LAB
ALBUMIN SERPL-MCNC: 3.3 G/DL (ref 3.4–5)
ALP SERPL-CCNC: 64 U/L (ref 40–150)
ALT SERPL W P-5'-P-CCNC: 20 U/L (ref 0–50)
ANION GAP SERPL CALCULATED.3IONS-SCNC: 8 MMOL/L (ref 3–14)
AST SERPL W P-5'-P-CCNC: 12 U/L (ref 0–45)
BACTERIA SPEC CULT: ABNORMAL
BASOPHILS # BLD AUTO: 0 10E9/L (ref 0–0.2)
BASOPHILS NFR BLD AUTO: 0.3 %
BILIRUB SERPL-MCNC: 0.3 MG/DL (ref 0.2–1.3)
BUN SERPL-MCNC: 10 MG/DL (ref 7–30)
CALCIUM SERPL-MCNC: 8.6 MG/DL (ref 8.5–10.1)
CHLORIDE SERPL-SCNC: 105 MMOL/L (ref 94–109)
CK SERPL-CCNC: 87 U/L (ref 30–225)
CO2 SERPL-SCNC: 25 MMOL/L (ref 20–32)
CREAT SERPL-MCNC: 0.72 MG/DL (ref 0.52–1.04)
DIFFERENTIAL METHOD BLD: ABNORMAL
EOSINOPHIL # BLD AUTO: 0.1 10E9/L (ref 0–0.7)
EOSINOPHIL NFR BLD AUTO: 1.6 %
ERYTHROCYTE [DISTWIDTH] IN BLOOD BY AUTOMATED COUNT: 13.4 % (ref 10–15)
GFR SERPL CREATININE-BSD FRML MDRD: >90 ML/MIN/{1.73_M2}
GLUCOSE SERPL-MCNC: 121 MG/DL (ref 70–99)
HCT VFR BLD AUTO: 33 % (ref 35–47)
HGB BLD-MCNC: 10.7 G/DL (ref 11.7–15.7)
IMM GRANULOCYTES # BLD: 0 10E9/L (ref 0–0.4)
IMM GRANULOCYTES NFR BLD: 0.4 %
LYMPHOCYTES # BLD AUTO: 3.1 10E9/L (ref 0.8–5.3)
LYMPHOCYTES NFR BLD AUTO: 44.4 %
Lab: ABNORMAL
MCH RBC QN AUTO: 29.6 PG (ref 26.5–33)
MCHC RBC AUTO-ENTMCNC: 32.4 G/DL (ref 31.5–36.5)
MCV RBC AUTO: 91 FL (ref 78–100)
MONOCYTES # BLD AUTO: 0.3 10E9/L (ref 0–1.3)
MONOCYTES NFR BLD AUTO: 4.5 %
NEUTROPHILS # BLD AUTO: 3.5 10E9/L (ref 1.6–8.3)
NEUTROPHILS NFR BLD AUTO: 48.8 %
NRBC # BLD AUTO: 0 10*3/UL
NRBC BLD AUTO-RTO: 0 /100
PLATELET # BLD AUTO: 211 10E9/L (ref 150–450)
POTASSIUM SERPL-SCNC: 3.9 MMOL/L (ref 3.4–5.3)
PROT SERPL-MCNC: 6.7 G/DL (ref 6.8–8.8)
RBC # BLD AUTO: 3.62 10E12/L (ref 3.8–5.2)
SODIUM SERPL-SCNC: 138 MMOL/L (ref 133–144)
SPECIMEN SOURCE: ABNORMAL
WBC # BLD AUTO: 7.1 10E9/L (ref 4–11)

## 2019-05-09 PROCEDURE — 25000132 ZZH RX MED GY IP 250 OP 250 PS 637: Performed by: PODIATRIST

## 2019-05-09 PROCEDURE — 25000128 H RX IP 250 OP 636: Performed by: INTERNAL MEDICINE

## 2019-05-09 PROCEDURE — 99232 SBSQ HOSP IP/OBS MODERATE 35: CPT | Performed by: INTERNAL MEDICINE

## 2019-05-09 PROCEDURE — 12000000 ZZH R&B MED SURG/OB

## 2019-05-09 PROCEDURE — 82550 ASSAY OF CK (CPK): CPT | Performed by: INTERNAL MEDICINE

## 2019-05-09 PROCEDURE — 25000132 ZZH RX MED GY IP 250 OP 250 PS 637: Performed by: INTERNAL MEDICINE

## 2019-05-09 PROCEDURE — 25000128 H RX IP 250 OP 636

## 2019-05-09 PROCEDURE — 25000131 ZZH RX MED GY IP 250 OP 636 PS 637: Performed by: PODIATRIST

## 2019-05-09 PROCEDURE — 25800030 ZZH RX IP 258 OP 636: Performed by: INTERNAL MEDICINE

## 2019-05-09 PROCEDURE — 80053 COMPREHEN METABOLIC PANEL: CPT | Performed by: INTERNAL MEDICINE

## 2019-05-09 PROCEDURE — 25000132 ZZH RX MED GY IP 250 OP 250 PS 637: Performed by: HOSPITALIST

## 2019-05-09 PROCEDURE — 85025 COMPLETE CBC W/AUTO DIFF WBC: CPT | Performed by: INTERNAL MEDICINE

## 2019-05-09 RX ORDER — CYCLOBENZAPRINE HCL 10 MG
10 TABLET ORAL 3 TIMES DAILY PRN
Status: DISCONTINUED | OUTPATIENT
Start: 2019-05-09 | End: 2019-05-10 | Stop reason: HOSPADM

## 2019-05-09 RX ORDER — DIPHENHYDRAMINE HYDROCHLORIDE 50 MG/ML
50 INJECTION INTRAMUSCULAR; INTRAVENOUS ONCE
Status: COMPLETED | OUTPATIENT
Start: 2019-05-09 | End: 2019-05-09

## 2019-05-09 RX ADMIN — COLCHICINE 0.6 MG: 0.6 TABLET, FILM COATED ORAL at 08:46

## 2019-05-09 RX ADMIN — LINACLOTIDE 290 MCG: 145 CAPSULE, GELATIN COATED ORAL at 07:13

## 2019-05-09 RX ADMIN — DIPHENHYDRAMINE HYDROCHLORIDE 50 MG: 50 INJECTION, SOLUTION INTRAMUSCULAR; INTRAVENOUS at 14:06

## 2019-05-09 RX ADMIN — GABAPENTIN 100 MG: 100 CAPSULE ORAL at 13:15

## 2019-05-09 RX ADMIN — SENNOSIDES 3 TABLET: 8.6 TABLET, FILM COATED ORAL at 21:25

## 2019-05-09 RX ADMIN — OMEPRAZOLE 40 MG: 20 CAPSULE, DELAYED RELEASE ORAL at 08:46

## 2019-05-09 RX ADMIN — SUCRALFATE 1 G: 1 SUSPENSION ORAL at 16:15

## 2019-05-09 RX ADMIN — SUCRALFATE 1 G: 1 SUSPENSION ORAL at 21:25

## 2019-05-09 RX ADMIN — COLCHICINE 0.6 MG: 0.6 TABLET, FILM COATED ORAL at 21:25

## 2019-05-09 RX ADMIN — ERTAPENEM SODIUM 500 MG: 1 INJECTION, POWDER, LYOPHILIZED, FOR SOLUTION INTRAMUSCULAR; INTRAVENOUS at 14:15

## 2019-05-09 RX ADMIN — SUCRALFATE 1 G: 1 SUSPENSION ORAL at 13:15

## 2019-05-09 RX ADMIN — AMITRIPTYLINE HYDROCHLORIDE 25 MG: 25 TABLET, FILM COATED ORAL at 21:26

## 2019-05-09 RX ADMIN — CITALOPRAM HYDROBROMIDE 10 MG: 10 TABLET ORAL at 08:46

## 2019-05-09 RX ADMIN — ACETAMINOPHEN 975 MG: 325 TABLET, FILM COATED ORAL at 18:00

## 2019-05-09 RX ADMIN — GUANFACINE 3 MG: 1 TABLET ORAL at 21:25

## 2019-05-09 RX ADMIN — POLYETHYLENE GLYCOL 3350 17 G: 17 POWDER, FOR SOLUTION ORAL at 13:15

## 2019-05-09 RX ADMIN — POLYETHYLENE GLYCOL 3350 17 G: 17 POWDER, FOR SOLUTION ORAL at 08:45

## 2019-05-09 RX ADMIN — POLYETHYLENE GLYCOL 3350 17 G: 17 POWDER, FOR SOLUTION ORAL at 21:26

## 2019-05-09 RX ADMIN — ONDANSETRON 4 MG: 4 TABLET, ORALLY DISINTEGRATING ORAL at 11:46

## 2019-05-09 RX ADMIN — NALOXEGOL OXALATE 25 MG: 25 TABLET, FILM COATED ORAL at 07:12

## 2019-05-09 RX ADMIN — ACETAMINOPHEN 975 MG: 325 TABLET, FILM COATED ORAL at 08:52

## 2019-05-09 RX ADMIN — SENNOSIDES 3 TABLET: 8.6 TABLET, FILM COATED ORAL at 08:46

## 2019-05-09 RX ADMIN — GUANFACINE 3 MG: 1 TABLET ORAL at 08:46

## 2019-05-09 RX ADMIN — DIPHENHYDRAMINE HYDROCHLORIDE 25 MG: 25 CAPSULE ORAL at 08:52

## 2019-05-09 RX ADMIN — CYPROHEPTADINE HYDROCHLORIDE 4 MG: 4 TABLET ORAL at 21:26

## 2019-05-09 RX ADMIN — OXYCODONE HYDROCHLORIDE 5 MG: 5 TABLET ORAL at 06:51

## 2019-05-09 RX ADMIN — GABAPENTIN 100 MG: 100 CAPSULE ORAL at 21:26

## 2019-05-09 RX ADMIN — SUCRALFATE 1 G: 1 SUSPENSION ORAL at 08:45

## 2019-05-09 RX ADMIN — GABAPENTIN 100 MG: 100 CAPSULE ORAL at 08:46

## 2019-05-09 ASSESSMENT — ACTIVITIES OF DAILY LIVING (ADL)
ADLS_ACUITY_SCORE: 12

## 2019-05-09 NOTE — PLAN OF CARE
DAy RN  Vss, CMS+ besides stated numbness in L foot. Ace CDI. Up with SBA gait belt and walker or indep. Moving well. L ankle elevated on 2 pillows. Ace is CDI  Stated doesn't like to take narcotics. Pain 5 or less prn dilaudid and scheduled tylenol. Ice to L ankle through out the shift  BS are hypo, Requested zofran for nausea. Tolerated eating and drinking well. Stated a nauseated person. Patient stated she had a large bm today, voiding well.  Requested benadryl for itching  Tolerated new iv antibiotic with iv benadryl no sign of side effects.

## 2019-05-09 NOTE — PROGRESS NOTES
[facility]  Hospitalist Progress Note  Kenroy Burciaga MD 05/09/19    Reason for Stay (Diagnosis): infected L ankle         Assessment and Plan:      Summary of Stay: Samara Oropeza is a 23 year old female with complex past medical history including per patient patient had syndrome, RA, fibromyalgia, gastroparesis, anxiety, night terrors, seizure-like activity, anemia, renal failure secondary to IV vancomycin, and previous left ankle surgery followed by infection with removal of hardware on 3/2019 who was directly admitted on 5/6/2019 from podiatry clinic with drainage from the left ankle and concern for ongoing infection despite her long course of IV daptomycin as an outpatient for MSSA positive cultures.  MRI was obtained that shows gas in the soft tissues of the left ankle.  She was initially continued on IV daptomycin and ID was consulted.  Underwent I&D on 5/7.  Culture now growing Enterobacter cloaca complex.  Multiple antibiotic allergies and she will be started on ertapenem, premedicated with Benadryl.    Problem List/Assessment and Plan:   L ankle infection: History previous left ankle surgery 1/2018 completed by infection with hardware removal on 3/2019.  Presented with drainage from the left ankle for the previous week despite being on IV daptomycin.  Previous cultures growing MSSA.  MRI of the ankle showing a 2 cm gas collection in the subcutaneous tissues anterior to the lateral malleolus.  -Podiatry consulted, underwent I&D on 5/7  -Left ankle wound culture growing moderate growth of Enterobacter cloaca complex that is pansensitive  -Multiple antibiotic allergies.  ID consulted.  -Has PICC line in place.  Daptomycin discontinued and started on ertapenem with IV Benadryl 50 mg premedication prior to ertapenem administration  -WBAT in walking boot    Behcet syndrome: controlled with colchicine which is continued.    Hx renal failure: following a course of IV vancomycin.  Creatinine  now returned to baseline.    Chronic anemia: Hemoglobin stable at baseline of 10.    Constipation: Likely secondary to pain medications and surgery.  Did have bowel movement today.  Continue senna, Colace, and MiraLAX.  Stop naloxegol.    History multiple chronic medical issues per patient including seizure-like activity not on antiepileptic medications, gastroparesis, fibromyalgia, anxiety, and rheumatoid arthritis again not on medication other than colchicine.  -Continue her PTA citalopram, amitriptyline, colchicine, cyproheptadine, linaclotide, guanfacine, cyproheptadine, gabapentin, and lorazepam as needed    DVT Prophylaxis: Pneumatic Compression Devices  Code Status: Full Code  FEN: Regular diet  Discharge Dispo: Home  Estimated Disch Date / # of Days until Disch: tomorrow if antibiotic plan in place        Interval History (Subjective):      Assumed care today.  Patient having some nausea after eating but tolerating liquids and diet mostly.  No vomiting.  Had some chest pain and throat pain with swallowing.  No fevers or chills.  Ankle pain fairly well controlled.  Did have bowel movement today.                  Physical Exam:      Last Vital Signs:  BP (!) 128/91 (BP Location: Right arm)   Pulse 107   Temp 96.2  F (35.7  C) (Oral)   Resp 16   Wt 70.7 kg (155 lb 14.4 oz)   LMP 04/13/2019   SpO2 98%   BMI 27.62 kg/m        Intake/Output Summary (Last 24 hours) at 5/9/2019 1503  Last data filed at 5/9/2019 0830  Gross per 24 hour   Intake 2150 ml   Output --   Net 2150 ml       Constitutional: Awake, NAD   Eyes: sclera white   HEENT:  MMM  Respiratory: no respiratory distress, lungs cta bilaterally, no crackles or wheeze  Cardiovascular: RRR.  No murmur  GI: non-tender, not distended, bowel sounds present  Skin: no rash  Musculoskeletal/extremities: bandage covering ankle  Neurologic: A&O   Psychiatric: calm, cooperative, normal affect         Medications:      All current medications were reviewed with  changes reflected in problem list.         Data:      All new lab and imaging data was reviewed.   Labs:  Recent Labs   Lab 05/07/19  1614 05/02/19  1701   CULT Moderate growth  Enterobacter cloacae complex  *  Culture negative monitoring continues No growth     Recent Labs   Lab 05/09/19  0707 05/07/19  0644    139   POTASSIUM 3.9 3.8   CHLORIDE 105 108   CO2 25 26   ANIONGAP 8 5   * 110*   BUN 10 11   CR 0.72 0.63   GFRESTIMATED >90 >90   GFRESTBLACK >90 >90   SHANKAR 8.6 8.3*     Recent Labs   Lab 05/09/19  0707 05/07/19  0644 05/02/19  1638   WBC 7.1 4.6 4.6   HGB 10.7* 10.0* 10.5*   HCT 33.0* 30.5* 32.4*   MCV 91 90 88    181 205      Imaging:   None today      Kenroy Burciaga MD

## 2019-05-09 NOTE — PROGRESS NOTES
Was called to look at patient's Aircast boot.  Complaint is loose and has hard time with adjustment of air bladders.  The fit is appropriate.  I advised patient it is ok to tighten the straps and the edges are flexible and will come in snug to her leg and I demonstrated this.  Patient said has a hard time with the rotary knob.  I advised that she can keep the pressure consistant and not have to adjust every time or let the air out.  If still concern about the rotary know perhaps OT would have adaptive device for patient.  Patient complains of hand weakness.  Please call orthotics if questions.  Edilma RAY.

## 2019-05-09 NOTE — PROGRESS NOTES
Discharge Planner   Discharge Plans in progress: RTN Home with resumption of [facility] services.   Barriers to discharge plan: CX pending and medical stability   Follow up plan: CM following in collaboration with the care team on discharge planning.        Entered by: Mikaela Rudolph 05/09/2019 10:51 AM       She does have a Knee Scooter from [facility], it was actually due back 5/7.       MD:   She will need a DME script for extension 2/2 to NWB status and please include anticipated length of need.  of the Knee Scooter on discharge.

## 2019-05-09 NOTE — PLAN OF CARE
Vss. Afebrile. Lungs clr-room air high 90s. Is done. Hypoactive bs/+gas, no nausea. Tolerating diet. Abdomen slightly distended. Last bm 05/06/19. Voiding. Lue Picc-locked. Drsg-cdi. Cms-Numbness to toes, weak flexion, & 1+ edema. Pain managed with Oxycodone/Tylenol. Daptomycin for antibiotic therapy. Tolerating ice & elevation on pillows. Repositioning self. No other significant issues noted overnight.

## 2019-05-09 NOTE — PROGRESS NOTES
This is a recent snapshot of the patient's [facility] medical record.  For current drug dose and complete information and questions, call [phone] or In Basket pool, fv home infusion ([id number])  CSN Number:  [id number]

## 2019-05-09 NOTE — PROGRESS NOTES
[facility]  Infectious Disease Progress Note          Assessment and Plan:   IMPRESSION:   1.  Healthy 23-year-old female with ankle hardware surgery in January, then postop March deep infection, status post hardware removal with methicillin-sensitive Staph aureus in the cultures, extended 6-week course of IV antibiotics with seeming resolution, but now with drainage and possible relapsed deep infection, still on antibiotics currently.   2.  Acute renal failure from vancomycin previously.  Repeat creatinine 2.13, now down to normal.   3.  PENICILLIN AND CEPHALOSPORIN ALLERGIES.      RECOMMENDATIONS:   1.  DC daptomycin, but looks like this is new infection/new organism.   2.  Note operative findings , no major purulence, but gram atain + and cx now enterobacter  Prior vanco renal failure, PCN and ceph allergies, could do prolonged quinolone or sulfa but  erta penem attempt first, benadryl predose and 500 mg first dose, if tolerated full course erta               Interval History:   no new complaints and doing well; no cp, sob, n/v/d, or abd pain. Some pain, no fever, feels Ok, cx is enterobacter deep              Medications:       acetaminophen  975 mg Oral Q8H     amitriptyline  25 mg Oral At Bedtime     artificial tears   Both Eyes At Bedtime     citalopram  10 mg Oral Daily     colchicine  0.6 mg Oral BID     cyproheptadine  4 mg Oral At Bedtime     diphenhydrAMINE (BENADRYL) intermittent infusion  50 mg Intravenous Once     ertapenem (INVanz) IV  500 mg Intravenous Q24H     gabapentin  100 mg Oral TID     guanFACINE  3 mg Oral BID     linaclotide  290 mcg Oral QAM AC     naloxegol  25 mg Oral QAM AC     omeprazole  40 mg Oral QAM     polyethylene glycol  17 g Oral TID     sennosides  3 tablet Oral BID     sodium chloride (PF)  3 mL Intracatheter Q8H     sucralfate  1 g Oral 4x Daily                  Physical Exam:   Blood pressure (!) 128/91, pulse 107, temperature 96.2  F (35.7  C),  temperature source Oral, resp. rate 16, weight 70.7 kg (155 lb 14.4 oz), last menstrual period 04/13/2019, SpO2 98 %, unknown if currently breastfeeding.  Wt Readings from Last 2 Encounters:   05/06/19 70.7 kg (155 lb 14.4 oz)   05/06/19 69.9 kg (154 lb)     Vital Signs with Ranges  Temp:  [96.2  F (35.7  C)-97.5  F (36.4  C)] 96.2  F (35.7  C)  Heart Rate:  [80-97] 97  Resp:  [14-16] 16  BP: (118-134)/(80-91) 128/91  SpO2:  [98 %-99 %] 98 %    Constitutional: Awake, alert, cooperative, no apparent distress   Lungs: Clear to auscultation bilaterally, no crackles or wheezing   Cardiovascular: Regular rate and rhythm, normal S1 and S2, and no murmur noted   Abdomen: Normal bowel sounds, soft, non-distended, non-tender   Skin: No rashes, no cyanosis, no edema foot wrapped   Other:           Data:   All microbiology laboratory data reviewed.  Recent Labs   Lab Test 05/09/19  0707 05/07/19  0644 05/02/19  1638   WBC 7.1 4.6 4.6   HGB 10.7* 10.0* 10.5*   HCT 33.0* 30.5* 32.4*   MCV 91 90 88    181 205     Recent Labs   Lab Test 05/09/19  0707 05/07/19  0644 04/30/19  1650   CR 0.72 0.63 0.78     Recent Labs   Lab Test 05/02/19  1638   SED 8     Recent Labs   Lab Test 05/07/19  1614 05/02/19  1701 03/12/19  1652 03/12/19  1646 03/09/19  1049 12/05/18  1404 10/31/18  1456 09/06/18  1345 11/29/16  1308   CULT Moderate growth  Enterobacter cloacae complex  *  Culture negative monitoring continues No growth No anaerobes isolated  Moderate growth  Staphylococcus aureus  * No anaerobes isolated  Moderate growth  Staphylococcus aureus  *  Light growth  Normal skin leila   Moderate growth  Staphylococcus aureus  This isolate DOES NOT demonstrate inducible clindamycin resistance in vitro. Clindamycin   is susceptible and could be used when indicated, however, erythromycin is resistant and   should not be used.  *  Moderate growth  Coagulase negative Staphylococcus  Susceptibility testing not routinely done  * >100,000  colonies/mL  Staphylococcus saprophyticus  * No growth 50,000 to 100,000 colonies/mL  Staphylococcus aureus  *  50,000 to 100,000 colonies/mL  Coagulase negative Staphylococcus  * No Beta Streptococcus isolated

## 2019-05-10 ENCOUNTER — HOME INFUSION (PRE-WILLOW HOME INFUSION) (OUTPATIENT)
Dept: PHARMACY | Facility: CLINIC | Age: 24
End: 2019-05-10

## 2019-05-10 VITALS
DIASTOLIC BLOOD PRESSURE: 87 MMHG | RESPIRATION RATE: 16 BRPM | OXYGEN SATURATION: 99 % | BODY MASS INDEX: 27.62 KG/M2 | SYSTOLIC BLOOD PRESSURE: 126 MMHG | HEART RATE: 81 BPM | WEIGHT: 155.9 LBS | TEMPERATURE: 96.5 F

## 2019-05-10 LAB
ERYTHROCYTE [DISTWIDTH] IN BLOOD BY AUTOMATED COUNT: 13.2 % (ref 10–15)
HCT VFR BLD AUTO: 31 % (ref 35–47)
HGB BLD-MCNC: 9.6 G/DL (ref 11.7–15.7)
MCH RBC QN AUTO: 28.9 PG (ref 26.5–33)
MCHC RBC AUTO-ENTMCNC: 31 G/DL (ref 31.5–36.5)
MCV RBC AUTO: 93 FL (ref 78–100)
PLATELET # BLD AUTO: 186 10E9/L (ref 150–450)
RBC # BLD AUTO: 3.32 10E12/L (ref 3.8–5.2)
WBC # BLD AUTO: 5.1 10E9/L (ref 4–11)

## 2019-05-10 PROCEDURE — 25000132 ZZH RX MED GY IP 250 OP 250 PS 637: Performed by: HOSPITALIST

## 2019-05-10 PROCEDURE — 85027 COMPLETE CBC AUTOMATED: CPT | Performed by: INTERNAL MEDICINE

## 2019-05-10 PROCEDURE — 25000132 ZZH RX MED GY IP 250 OP 250 PS 637: Performed by: PODIATRIST

## 2019-05-10 PROCEDURE — 25000132 ZZH RX MED GY IP 250 OP 250 PS 637: Performed by: INTERNAL MEDICINE

## 2019-05-10 PROCEDURE — 99239 HOSP IP/OBS DSCHRG MGMT >30: CPT | Performed by: INTERNAL MEDICINE

## 2019-05-10 PROCEDURE — 25000128 H RX IP 250 OP 636: Performed by: INTERNAL MEDICINE

## 2019-05-10 PROCEDURE — 25000131 ZZH RX MED GY IP 250 OP 636 PS 637: Performed by: PODIATRIST

## 2019-05-10 RX ORDER — ERTAPENEM 1 G/1
1 INJECTION, POWDER, LYOPHILIZED, FOR SOLUTION INTRAMUSCULAR; INTRAVENOUS EVERY 24 HOURS
Status: DISCONTINUED | OUTPATIENT
Start: 2019-05-10 | End: 2019-05-10 | Stop reason: HOSPADM

## 2019-05-10 RX ORDER — OXYCODONE HYDROCHLORIDE 5 MG/1
5-10 TABLET ORAL EVERY 6 HOURS PRN
Qty: 12 TABLET | Refills: 0 | Status: SHIPPED | OUTPATIENT
Start: 2019-05-10 | End: 2019-06-12

## 2019-05-10 RX ORDER — DIPHENHYDRAMINE HCL 50 MG
50 CAPSULE ORAL EVERY 6 HOURS PRN
Qty: 100 CAPSULE | Refills: 0 | Status: SHIPPED | OUTPATIENT
Start: 2019-05-10 | End: 2019-06-25

## 2019-05-10 RX ADMIN — ACETAMINOPHEN 975 MG: 325 TABLET, FILM COATED ORAL at 09:21

## 2019-05-10 RX ADMIN — GUANFACINE 3 MG: 1 TABLET ORAL at 09:21

## 2019-05-10 RX ADMIN — SUCRALFATE 1 G: 1 SUSPENSION ORAL at 12:39

## 2019-05-10 RX ADMIN — COLCHICINE 0.6 MG: 0.6 TABLET, FILM COATED ORAL at 09:21

## 2019-05-10 RX ADMIN — ERTAPENEM SODIUM 1 G: 1 INJECTION, POWDER, LYOPHILIZED, FOR SOLUTION INTRAMUSCULAR; INTRAVENOUS at 13:59

## 2019-05-10 RX ADMIN — ONDANSETRON 4 MG: 4 TABLET, ORALLY DISINTEGRATING ORAL at 09:34

## 2019-05-10 RX ADMIN — GABAPENTIN 100 MG: 100 CAPSULE ORAL at 13:48

## 2019-05-10 RX ADMIN — CITALOPRAM HYDROBROMIDE 10 MG: 10 TABLET ORAL at 09:21

## 2019-05-10 RX ADMIN — SENNOSIDES 3 TABLET: 8.6 TABLET, FILM COATED ORAL at 09:21

## 2019-05-10 RX ADMIN — SUCRALFATE 1 G: 1 SUSPENSION ORAL at 09:21

## 2019-05-10 RX ADMIN — ACETAMINOPHEN 975 MG: 325 TABLET, FILM COATED ORAL at 01:31

## 2019-05-10 RX ADMIN — LINACLOTIDE 290 MCG: 145 CAPSULE, GELATIN COATED ORAL at 06:31

## 2019-05-10 RX ADMIN — GABAPENTIN 100 MG: 100 CAPSULE ORAL at 09:21

## 2019-05-10 RX ADMIN — CYCLOBENZAPRINE HYDROCHLORIDE 10 MG: 10 TABLET, FILM COATED ORAL at 12:39

## 2019-05-10 RX ADMIN — OMEPRAZOLE 40 MG: 20 CAPSULE, DELAYED RELEASE ORAL at 09:21

## 2019-05-10 ASSESSMENT — ACTIVITIES OF DAILY LIVING (ADL)
ADLS_ACUITY_SCORE: 12

## 2019-05-10 NOTE — DISCHARGE SUMMARY
[facility]  Discharge Summary  Name: Samara Oropeza    MRN: [id number]  YOB: 1995    Age: 23 year old  Date of Discharge:  5/10/2019  Date of Admission: 5/6/2019  Primary Care Provider: Sonja Abreu  Discharge Physician:  Kenroy Burciaga MD  Discharging Service:  Hospitalist      Discharge Diagnoses:  Left ankle infection  Behcet syndrome  History renal failure  Chronic anemia  Constipation  History fibromyalgia  History anxiety  History rheumatoid arthritis  History seizure-like activity     Hospital Course:  Summary of Stay: Samara Oropeza is a 23 year old female with complex past medical history including per patient patient had syndrome, RA, fibromyalgia, gastroparesis, anxiety, night terrors, seizure-like activity, anemia, renal failure secondary to IV vancomycin, and previous left ankle surgery followed by infection with removal of hardware on 3/2019 who was directly admitted on 5/6/2019 from podiatry clinic with drainage from the left ankle and concern for ongoing infection despite her long course of IV daptomycin as an outpatient for MSSA positive cultures.  MRI was obtained that shows gas in the soft tissues of the left ankle.  She was initially continued on IV daptomycin and ID was consulted.  Underwent I&D on 5/7.  PICC line was replaced.  Culture now growing Enterobacter cloaca complex.    Daptomycin discontinued.  Multiple antibiotic allergies and she will be started on ertapenem, premedicated with Benadryl.  Tolerating ertapenem.  Discharge home with home infusion for 6-week course of ertapenem.  Follow-up with podiatry clinic in 1 week.  Follow-up with infectious disease in clinic.     Problem List/Assessment and Plan:   L ankle infection: History previous left ankle surgery 1/2018 completed by infection with hardware removal on 3/2019.  Presented with drainage from the left ankle for the previous week despite being on IV daptomycin.  Previous cultures  growing MSSA.  MRI of the ankle showing a 2 cm gas collection in the subcutaneous tissues anterior to the lateral malleolus.  -Podiatry consulted, underwent I&D on 5/7  -Left ankle wound culture growing moderate growth of Enterobacter cloaca complex that is pansensitive  -Multiple antibiotic allergies.  ID consulted.  -PICC line was replaced here.  Daptomycin discontinued and started on ertapenem per infectious disease.  She will complete a 6 week home infusion course of this on 6/19/2019  -WBAT in walking boot  -DME for knee scooter  -Small supply of oxycodone prescribed for postsurgical pain  -Follow-up in podiatry clinic in 1 week  -Follow-up with infectious disease either Dr. Escoto in clinic or [facility]     Behcet syndrome: controlled with colchicine which is continued.     Hx renal failure: following a course of IV vancomycin.  Creatinine now returned to baseline.     Chronic anemia: Hemoglobin stable at baseline of 10.     Constipation: Likely secondary to pain medications and surgery.  Did have bowel movement today.  Continue senna, Colace, and MiraLAX.  Stop naloxegol.     History multiple chronic medical issues per patient including seizure-like activity not on antiepileptic medications, gastroparesis, fibromyalgia, anxiety, and rheumatoid arthritis again not on medication other than colchicine.  -Continued her PTA citalopram, amitriptyline, colchicine, cyproheptadine, linaclotide, guanfacine, cyproheptadine, gabapentin, and lorazepam as needed        Discharge Disposition:  Discharged to home     Allergies:  Allergies   Allergen Reactions     Amoxil [Penicillins] Rash     Dad unsure of reaction.     Bee Venom Anaphylaxis     Contrast Dye Rash     Contrast Media Ready-Box [facility] [place], 04/09/2014.; Contrast Media Ready-Box [facility] [place], 04/09/2014.  NOTE: this is a contrast media oral with iodine. Premedicate with methylpred standard for IV contrast, request barium contrast for oral contrast.     Orange  Fruit [Citrus] Anaphylaxis     Pineapple Anaphylaxis, Difficulty breathing and Rash     Reglan [Metoclopramide] Other (See Comments)     IV dose only, in ER, rapid heart rate.     Ace Inhibitors      Difficulty in breathing and GI upset     Amitiza [Lubiprostone] Nausea and Vomiting     Amoxicillin-Pot Clavulanate      Midazolam Unknown     Other reaction(s): Unknown  parent states that when pt takes this medication, she wakes up being very violent .  parent states that when pt takes this medication, she wakes up being very violent .     No Clinical Screening - See Comments      Bleech/ chest tightness, itchy throat, swollen tongue, hives     Tizanidine Other (See Comments)     Confusion, back pain, photophobia, abdominal pain, shaking, anxious       Versed      Coming out of pelvic exam at age of 5, was kicking and screaming when coming out of the versed.     Adhesive Tape Rash     Azithromycin Hives and Rash     Cephalexin Itching and Rash     Itchy mouth  Itchy mouth     Keflex [Cephalexin-Fd&C Yellow #6] Hives        Discharge Medications:   Current Discharge Medication List      START taking these medications    Details   ertapenem (INVANZ) 1 GM injection Inject 1 g into the vein every 24 hours ESR,CRP,CBC with differential, creatinine, SGOT weekly while on this medication to be faxed to Dr. Escoto office.  Qty: 400 mL, Refills: 0    Associated Diagnoses: Infection of joint of ankle (H)      !! order for DME Equipment being ordered: Other: Knee scooter  Treatment Diagnosis: infected left ankle joint  Qty: 1 each, Refills: 0    Associated Diagnoses: Infection of joint of ankle (H)      oxyCODONE (ROXICODONE) 5 MG tablet Take 1-2 tablets (5-10 mg) by mouth every 6 hours as needed for moderate to severe pain  Qty: 12 tablet, Refills: 0    Associated Diagnoses: Infection of joint of ankle (H)       !! - Potential duplicate medications found. Please discuss with provider.      CONTINUE these medications which have  NOT CHANGED    Details   amitriptyline (ELAVIL) 25 MG tablet Take 1 tablet (25 mg) by mouth At Bedtime  Qty: 30 tablet, Refills: 1    Associated Diagnoses: Pruritus; Fibromyalgia      artificial tears OINT ophthalmic ointment 0.5 inch strip each eye at night  Qty: 1 Tube, Refills: 11    Associated Diagnoses: Bilateral dry eyes      bisacodyl (DULCOLAX) 5 MG EC tablet Take 2 tablets (10 mg) by mouth daily as needed for constipation  Qty: 30 tablet, Refills: 0    Associated Diagnoses: Acute abdominal pain      citalopram (CELEXA) 10 MG tablet Take 1 tablet (10 mg) by mouth daily  Qty: 30 tablet, Refills: 1    Associated Diagnoses: TAHIR (generalized anxiety disorder)      colchicine (COLCYRS) 0.6 MG tablet Take 0.6 mg by mouth 2 times daily      cyproheptadine (PERIACTIN) 4 MG tablet Take 1 tablet (4 mg) by mouth At Bedtime For nightmares  Qty: 30 tablet, Refills: 2    Associated Diagnoses: Nightmares      dicyclomine (BENTYL) 20 MG tablet Take 1 tablet (20 mg) by mouth 4 times daily as needed  Qty: 120 tablet, Refills: 3    Associated Diagnoses: Abdominal cramping      diphenhydrAMINE (BENADRYL) 25 MG tablet Take 1 tablet (25 mg) by mouth 3 times daily as needed (itching)  Qty: 40 tablet, Refills: 1    Associated Diagnoses: Pruritus      EPINEPHrine (EPIPEN 2-EAMON) 0.3 MG/0.3ML injection Inject 0.3 mLs (0.3 mg) into the muscle as needed for anaphylaxis  Qty: 0.6 mL, Refills: 3    Associated Diagnoses: Hx of bee sting allergy      gabapentin (NEURONTIN) 100 MG capsule Take 1 capsule (100 mg) by mouth 3 times daily as needed (anxiety)  Qty: 90 capsule, Refills: 1    Associated Diagnoses: TAHIR (generalized anxiety disorder)      guanFACINE (TENEX) 1 MG tablet Take 3 tablets (3 mg) by mouth twice daily in the morning and evening.  Qty: 180 tablet, Refills: 0    Associated Diagnoses: Tic      hyoscyamine (ANASPAZ/LEVSIN) 0.125 MG tablet Take 1-2 tablets (125-250 mcg) by mouth every 4 hours as needed for cramping  Qty: 40  tablet, Refills: 1    Associated Diagnoses: Abdominal pain, generalized      lactulose 20 GM/30ML SOLN Take 30 mLs by mouth 3 times daily as needed (for constipation)  Qty: 300 mL, Refills: 3    Comments: Clear solution  Associated Diagnoses: Constipation, unspecified constipation type      linaclotide (LINZESS) 290 MCG capsule Take 1 capsule (290 mcg) by mouth every morning (before breakfast)  Qty: 90 capsule, Refills: 3    Associated Diagnoses: Chronic idiopathic constipation      LORazepam (ATIVAN) 0.5 MG tablet Take 1 tablet (0.5 mg) by mouth every 6 hours as needed for anxiety  Qty: 10 tablet, Refills: 0    Associated Diagnoses: TAHIR (generalized anxiety disorder)      Magic Mouthwash (FV std formula) lidocaine visc 2% 2.5mL/5mL & maalox/mylanta w/ simeth 2.5mL/5mL & diphenhydrAMINE 5mg/5mL Swish and swallow 10 mLs in mouth every 6 hours as needed for mouth sores  Qty: 1 Bottle, Refills: 1    Associated Diagnoses: Behcet's syndrome (H)      omeprazole (PRILOSEC) 40 MG capsule Take 1 capsule (40 mg) by mouth daily  Qty: 90 capsule, Refills: 3    Associated Diagnoses: Gastroesophageal reflux disease, esophagitis presence not specified      ondansetron (ZOFRAN-ODT) 8 MG ODT tab Take 1 tablet (8 mg) by mouth every 8 hours as needed for nausea  Qty: 90 tablet, Refills: 1    Associated Diagnoses: Constipation, unspecified constipation type; Acute kidney injury (H); Nausea      polyethylene glycol (MIRALAX/GLYCOLAX) powder Take 1 capful by mouth 3 times daily      sennosides (SENOKOT) 8.6 MG tablet Take 3 tablets by mouth 2 times daily  Qty: 240 tablet, Refills: 3    Associated Diagnoses: Irritable bowel syndrome with both constipation and diarrhea      SPRINTEC 28 0.25-35 MG-MCG tablet Take one tablet by mouth daily. Take continuously.  Qty: 84 tablet, Refills: 1    Associated Diagnoses: General counseling for prescription of oral contraceptives      sucralfate (CARAFATE) 1 GM/10ML suspension Take 10 mLs (1 g) by  mouth 4 times daily  Qty: 1200 mL, Refills: 2    Associated Diagnoses: Bile reflux gastritis; Nausea      albuterol (PROAIR HFA/PROVENTIL HFA/VENTOLIN HFA) 108 (90 BASE) MCG/ACT Inhaler Inhale 2 puffs into the lungs every 6 hours as needed for shortness of breath / dyspnea or wheezing  Qty: 1 Inhaler, Refills: 1    Associated Diagnoses: Atypical chest pain      aspirin (ASPIRIN CHILDRENS) 81 MG chewable tablet Take 81 mg by mouth as needed       benzocaine (TOPICALE XTRA) 20 % GEL Apply as needed locally to mouth or nasal ulcers for pain; 4 times daily as needed  Qty: 30 g, Refills: 1    Associated Diagnoses: Behcet's disease (H)      betamethasone valerate (VALISONE) 0.1 % cream Apply topically 2 times daily as needed       clobetasol (TEMOVATE) 0.05 % cream Apply topically 2 times daily  Qty: 60 g, Refills: 0    Associated Diagnoses: Folliculitis      hydrocortisone 1 % CREA cream Place rectally 2 times daily as needed for itching  Qty: 28.35 g, Refills: 1    Associated Diagnoses: Hemorrhoids, unspecified hemorrhoid type      hypromellose (GENTEAL) 0.3 % SOLN 1 drop every hour as needed for dry eyes       lidocaine (LMX4) 4 % external cream Apply topically once as needed for mild pain      !! order for DME Roll-A-Bout Walker. Patient can use for 3 months  Qty: 1 Units, Refills: 0    Associated Diagnoses: S/P ankle ligament repair      !! order for DME Equipment being ordered: size 8 1/2 walking boot tall  Qty: 1 Device, Refills: 0    Associated Diagnoses: S/P ankle ligament repair      !! order for DME Equipment being ordered: [employer] knee scooter  Qty: 1 Device, Refills: 0    Associated Diagnoses: S/P foot surgery, left      !! order for DME Equipment being ordered: adult pair of crutches  Qty: 1 Device, Refills: 0    Associated Diagnoses: S/P foot surgery, left      sodium chloride 0.9% SOLN BOLUS Inject 1,000 mLs into the vein daily as needed (IV abx and flushes)  Qty: 1000 mL, Refills: 11    Associated  Diagnoses: Cellulitis of left lower extremity       !! - Potential duplicate medications found. Please discuss with provider.      STOP taking these medications       DAPTOmycin 350 MG SOLR Comments:   Reason for Stopping:                Condition on Discharge:  Discharge condition: Good   Discharge vitals: Blood pressure 126/87, pulse 81, temperature 96.5  F (35.8  C), temperature source Axillary, resp. rate 16, weight 70.7 kg (155 lb 14.4 oz), last menstrual period 04/13/2019, SpO2 99 %, unknown if currently breastfeeding.   Code status on discharge: Full Code     History of Illness:  See detailed admission note for full details.    Physical Exam:  Blood pressure 126/87, pulse 81, temperature 96.5  F (35.8  C), temperature source Axillary, resp. rate 16, weight 70.7 kg (155 lb 14.4 oz), last menstrual period 04/13/2019, SpO2 99 %, unknown if currently breastfeeding.  Wt Readings from Last 1 Encounters:   05/06/19 70.7 kg (155 lb 14.4 oz)     Constitutional: Awake, NAD   Eyes: sclera white   HEENT:  MMM  Respiratory: no respiratory distress, lungs cta bilaterally, no crackles or wheeze  Cardiovascular: RRR.  No murmur   GI: non-tender, not distended, bowel sounds present  Skin: Left ankle dressing intact  Musculoskeletal/extremities: atraumatic, no major deformities. No edema  Neurologic: A&O, speech clear  Psychiatric: calm, cooperative, normal affect    Procedures other than Imaging:  Left ankle I&D     Imaging:  Results for orders placed or performed during the hospital encounter of 05/06/19   MR Ankle Left w/o & w Contrast    Narrative    MRI LEFT ANKLE WITHOUT AND WITH INTRAVENOUS CONTRAST   5/7/2019 11:17  AM    HISTORY: Ankle surgery with postoperative infection. Evaluate for  osteomyelitis.    COMPARISON: An MRI on 4/26/2017 and radiographs on 5/3/2019.    TECHNIQUE: Multiplanar MR imaging was performed through the left ankle  before and after the uneventful intravenous administration of 7.5 mL  of Gadavist  contrast. A marker was placed over the site of clinical  concern anterior to the lateral malleolus.     FINDINGS: At the indicated site of clinical concern, there is a large  area of absent signal in the subcutaneous tissues just anterior to the  lateral malleolus. This likely represents gas and corresponds to the  lucency in this area on the recent radiographs. This area measures  approximately 2.5 x 1.8 x 0.9 cm. There is mild edema in the  surrounding soft tissues with no distinct drainable abscess seen.    No fracture or osseous lesion is demonstrated. No abnormal marrow  signal intensity or contrast enhancement is identified. No joint  effusion is seen. The joint spaces and cartilage surfaces are well  preserved.    No tendon pathology is identified. The ligamentous structures are  unremarkable. The visualized plantar fascia is normal.      Impression    IMPRESSION: There is a just over 2 cm gas collection in the  subcutaneous tissues anterior to the lateral malleolus. However, no  distinct drainable abscess or fluid collection is seen and there is no  evidence of osteomyelitis.      DEBRA LUX MD     *Note: Due to a large number of results and/or encounters for the requested time period, some results have not been displayed. A complete set of results can be found in Results Review.        Consultations:  Consultation during this admission received from infectious disease and podiatry clinic.       Recent Lab Results:  Recent Labs   Lab 05/10/19  0527 05/09/19  0707 05/07/19  0644   WBC 5.1 7.1 4.6   HGB 9.6* 10.7* 10.0*   HCT 31.0* 33.0* 30.5*   MCV 93 91 90    211 181     Recent Labs   Lab 05/09/19  0707 05/07/19  0644    139   POTASSIUM 3.9 3.8   CHLORIDE 105 108   CO2 25 26   ANIONGAP 8 5   * 110*   BUN 10 11   CR 0.72 0.63   GFRESTIMATED >90 >90   GFRESTBLACK >90 >90   SHANKAR 8.6 8.3*     Recent Labs   Lab 05/07/19  1614   CULT Moderate growth  Enterobacter cloacae complex  *   Culture negative monitoring continues          Pending Results:    Unresulted Labs Ordered in the Past 30 Days of this Admission     Date and Time Order Name Status Description    5/7/2019 1615 Anaerobic bacterial culture Preliminary          These results will be followed up by patient's primary care provider.    Discharge Instructions and Follow-Up:   Discharge Procedure Orders   Home infusion referral   Referral Priority: Routine   Number of Visits Requested: 1     Reason for your hospital stay   Order Comments: You were hospitalized for infection of the ankle and underwent irrigation and debridement.  You will continue on a course of IV ertapenem home infusions daily.  Podiatry clinic next week and follow-up with infectious disease in clinic.  Weightbearing as tolerated to the ankle in the walking boot.     Follow-up and recommended labs and tests    Order Comments: Follow-up in podiatry clinic next week    Follow-up in infectious disease clinic with either Dr. Escoto or through the [place] where you have been seen previously     Activity   Order Comments: Your activity upon discharge: Weightbearing as tolerated to the ankle in walking boot     Order Specific Question Answer Comments   Is discharge order? Yes      Full Code     Order Specific Question Answer Comments   Code status determined by: Discussion with patient/legal decision maker      Diet   Order Comments: Follow this diet upon discharge: Orders Placed This Encounter      Advance Diet as Tolerated: Regular Diet Adult     Order Specific Question Answer Comments   Is discharge order? Yes        I, Kenroy Burciaga, personally saw the patient today and spent greater than 30 minutes discharging this patient.    Kenroy Burciaga MD

## 2019-05-10 NOTE — PROGRESS NOTES
[facility]    Mild pain. No other complaints.     EXAM:  B/P: 126/87, T: 96.5, P: 81, R: 16  Incision copated and all sutures intact.  No maceration  No redness  Minimal edema    Gram stain: gram negative rods  Fluid cultures: enterobacter cloacae    ASSESSMENT:  23-year old female status post irrigation of left ankle 5/7/19 for dehiscence of lateral ankle incision and infection.  No clinical signs of infection.     PLAN:  Sterile redress of left ankle  Weight bearing in CAM walker  Dressing can be left on until follow up with Dr. Johnson  Foot relevant discharge orders to be placed  Okay from Podiatry standpoint for discharge to home  PICC line and IV ertapenem per ID - follow up with ID as outpatient    Kimo Hunt DPM, FACFAS, MS  [place] [facility] & [facility]le [facility]  [phone]

## 2019-05-10 NOTE — PLAN OF CARE
Pt is A&O x4.  Independent in the room.  Denies pain.  Tolerating Reg diet.  PICC is SL.  VSS.  Pt needs boot cam on when out of bed.  Left ankle dressing is C/D/I.  +1 edema in left ankle/foot.  Pt slept well overnight. Hgb 9.6 and Hematocrit 31. Pt refused artificial tears.  Possible discharge home today.  Continue plan of care.

## 2019-05-10 NOTE — PLAN OF CARE
DAy RN  Jaleel, CMS+ besides stated numbness in L foot. Ace CDI. Up with SBA gait belt and walker or indep. Moving well. L ankle elevated on 2 pillows. Ace is CDI. Encourage to walk in linn and sit up in chair, stated walking in room     Stated doesn't like to take narcotics. Pain about a 4 in the L ankle, declined pain meds did request fl;exeril for spasms in L ankle   Ice to L ankle through out the shift  BS are hypo, Requested zofran for nausea before breakfast, no dry heaves. Tolerated eating and drinking well.  Patient stated she had 2  bm's today declined mirilax took senna, voiding well.  Iv dose of anx changed, per id no benadryl needed before dose

## 2019-05-10 NOTE — PROGRESS NOTES
Transition Communication Hand-off for Care Transitions to Next Level of Care Provider    Name: Samara Oropeza  : 1995  MRN #: [id number]  Primary Care Provider: Sonja Abreu  Primary Care MD Name: NP , AVA   Primary Clinic: [address]  [facility] [address]  Primary Care Clinic Name: [facility]   Reason for Hospitalization:  abscess  Infection of joint of ankle (H)  Admit Date/Time: 2019  6:04 PM  Discharge Date: 5/10/2019  Payor Source: Payor: MEDICA / Plan: MEDICA CHOICE / Product Type: Indemnity /     Readmission Assessment Measure (ILYA) Risk Score/category: ELEVATED        Reason for Communication Hand-off Referral: Other Pt admitted 3rd time since  for Ankle injury and OR. Asssed pt for GAPS and or concerns. FOllowed IN HOuse in collboartion with Care team + [facility] for dsicharge planning and Care coordination.     Discharge Plan: RTN Home with [facility]   Discharge Needs Assessment:  Needs      Most Recent Value   # of Referrals Placed by [facility]  Home Infusion, Insurance Verification for DME        Follow-up specialty is recommended:     2019  1:30 PM [facility] MA VISIT [facility]   2019  2:00 PM Mary Kay Garcia DO [facility]   2019  2:00 PM Rosangela Hollingsworth, [facility]   2019  2:00 PM George Finn, LICSW [facility]   5/10/2019  1:30 PM Tuan Montes MD [facility]   2019  4:30 PM Blayne Alexandre MD [facility]   2019 11:00 AM [facility] [place]   2019 10:30 AM Austen Marquez MD [facility]   2019  2:20 PM Alejandrina Hernandez MD [facility]   10/30/2019  1:00 PM Joseph De La Torre MD [facility]                 Key Recommendations: Met with pt at bedside who indicated a history of ankle injury starting in 2019, a hardware removal in 3/2019 and Home IV Abx, rtn now with OR planned for wound dehiscence.   Discharge follow  up recommendations with Podiatry as indicated on discharge AVS   She was discharged with resumption of [facility].     Her Knee scooter ordfer was extended and addressed with [facility] , they were faxed the udpated order    Mikaela Rudolph    AVS/Discharge Summary is the source of truth; this is a helpful guide for improved communication of patient story

## 2019-05-10 NOTE — DISCHARGE INSTRUCTIONS
Weight bear as tolerated on L ankle with cam boot on.   Elevate L ankle on 2 pillows when in bed.    Call ortho surgeon before or after your follow up appointment if you experience any of these issues or have concerns:  1. Increased/persistent temperature chills and/or sweats. Persistent low grade >99 or temp >100  2.increased/uncontrolled pain despite pain medication, sedated   3.increased/persistent bleeding, redness, swelling, bruising, drainage (yellow/odorous) or incision would pull apart  4. Calf pain, swollen/hard/warm area, chest pain or shortness of breath  5.weakness in operative leg, knee buckling, numbness and/or tingling. Or if you Fall  6. Nausea, vomiting, constipation and/or diarrhea  7. Too sleepy (you may need to reduce pain medication)  8.Constipation unrelieved by stool softeners (see Other instructions below)  9. General feeling illness    Diet Information  Drink plenty of fluids  Eat a regular balanced diet    Dressing information:      Inspect surrounding skin daily for s/s of infections.   Do not soak in tub or pool.   If dressing loosens wash hands, tape down edge and call surgeon.  If dressing is persistently full of drainage call your suregon   call surgeon for further direction.    Other instructions:  1. Take an over the counter stool softener (mirilax or senna) as directed while on narcotics to ensure bowel movements are regular.  Take a laxative (milk of magnesia and/or a suppository )as directed if no bm in 2 days.  3. Keep track of when you take all medication, especially pain medication. See bottles for instructions and side effects  4. Use incentive spirometry hourly until you are back to normal activity (helps prevent pneumonia)  5. See medication handouts for medication information and side effects  6. DO NOT DRINK or DRIVE on narcotic pain medication.    Per podiatry keep dressing clean and dry, do not remove dressing... If moist drainage then call doctor sooner or after follow  up  Do not shower.. With ace wrap replace if loosening. Start at foot wrap up towards ankle, try to keep ace smooth.      USe epi pen for allergic reaction, monitor for sever itching, hives or rash. Call home care if issues with antibiotics. Call 911 if sever reaction.   *Friends, family and or care givers please read and review all this discharge information and medication sheet    If unable to wake call 911-due to pain medication    Follow up information  Call Md to make a follow up apt. With podiatry next week  [phone]; call this same number if you have questions or concens  Thank you for choosing [facility]

## 2019-05-10 NOTE — PLAN OF CARE
Pt given discharge instructions and medications. All questions were answered. Pt was escorted down to vehicle in wheelchair by staff @1700.

## 2019-05-10 NOTE — PLAN OF CARE
Pt A&O x4. VS stable; afebrile. Scheduled tylenol managing pain. CMS: numbness to L foot. +1 edema to L foot. Dressing CDI-incision iced and elevated. Continues on IV Invanz. Up independently in room. Wearing cam boot when out of bed. Voiding in good amts. Tolerating regular diet. Plan is for possible discharge to home tomorrow. Will continue to monitor.

## 2019-05-10 NOTE — PROGRESS NOTES
[facility]  Infectious Disease Progress Note          Assessment and Plan:   IMPRESSION:   1.  Healthy 23-year-old female with ankle hardware surgery in January, then postop March deep infection, status post hardware removal with methicillin-sensitive Staph aureus in the cultures, extended 6-week course of IV antibiotics with seeming resolution, but now with drainage and possible relapsed deep infection, still on antibiotics currently.   2.  Acute renal failure from vancomycin previously.  Repeat creatinine 2.13, now down to normal.   3.  PENICILLIN AND CEPHALOSPORIN ALLERGIES.      RECOMMENDATIONS:   1.  DC daptomycin,  looks like this is new infection/new organism.   2.  Note operative findings , no major purulence, but gram atain + and cx now enterobacter  Prior vanco renal failure, PCN and ceph allergies, could do prolonged quinolone or sulfa but  erta penem attempt first, benadryl predose and 500 mg first dose, tolerated, to full dose, no further benadryl, home orders in  3 Follow-up me or [facility] ID already connected with               Interval History:   no new complaints and doing well; no cp, sob, n/v/d, or abd pain. Some pain, no fever, feels Ok, cx is enterobacter deep  No rash or reaction, foot feels OK              Medications:       acetaminophen  975 mg Oral Q8H     amitriptyline  25 mg Oral At Bedtime     artificial tears   Both Eyes At Bedtime     citalopram  10 mg Oral Daily     colchicine  0.6 mg Oral BID     cyproheptadine  4 mg Oral At Bedtime     ertapenem (INVanz) IV  1 g Intravenous Q24H     gabapentin  100 mg Oral TID     guanFACINE  3 mg Oral BID     linaclotide  290 mcg Oral QAM AC     omeprazole  40 mg Oral QAM     polyethylene glycol  17 g Oral TID     sennosides  3 tablet Oral BID     sodium chloride (PF)  10 mL Intracatheter Q7 Days     sodium chloride (PF)  3 mL Intracatheter Q8H     sucralfate  1 g Oral 4x Daily                  Physical Exam:   Blood pressure  126/87, pulse 81, temperature 96.5  F (35.8  C), temperature source Axillary, resp. rate 16, weight 70.7 kg (155 lb 14.4 oz), last menstrual period 04/13/2019, SpO2 99 %, unknown if currently breastfeeding.  Wt Readings from Last 2 Encounters:   05/06/19 70.7 kg (155 lb 14.4 oz)   05/06/19 69.9 kg (154 lb)     Vital Signs with Ranges  Temp:  [96.5  F (35.8  C)-97.7  F (36.5  C)] 96.5  F (35.8  C)  Pulse:  [80-81] 81  Heart Rate:  [92-94] 94  Resp:  [16] 16  BP: (112-126)/(74-87) 126/87  SpO2:  [99 %-100 %] 99 %    Constitutional: Awake, alert, cooperative, no apparent distress   Lungs: Clear to auscultation bilaterally, no crackles or wheezing   Cardiovascular: Regular rate and rhythm, normal S1 and S2, and no murmur noted   Abdomen: Normal bowel sounds, soft, non-distended, non-tender   Skin: No rashes, no cyanosis, no edema foot wrapped   Other:           Data:   All microbiology laboratory data reviewed.  Recent Labs   Lab Test 05/10/19  0527 05/09/19  0707 05/07/19  0644   WBC 5.1 7.1 4.6   HGB 9.6* 10.7* 10.0*   HCT 31.0* 33.0* 30.5*   MCV 93 91 90    211 181     Recent Labs   Lab Test 05/09/19  0707 05/07/19  0644 04/30/19  1650   CR 0.72 0.63 0.78     Recent Labs   Lab Test 05/02/19  1638   SED 8     Recent Labs   Lab Test 05/07/19  1614 05/02/19  1701 03/12/19  1652 03/12/19  1646 03/09/19  1049 12/05/18  1404 10/31/18  1456 09/06/18  1345 11/29/16  1308   CULT Moderate growth  Enterobacter cloacae complex  *  Culture negative monitoring continues No growth No anaerobes isolated  Moderate growth  Staphylococcus aureus  * No anaerobes isolated  Moderate growth  Staphylococcus aureus  *  Light growth  Normal skin leila   Moderate growth  Staphylococcus aureus  This isolate DOES NOT demonstrate inducible clindamycin resistance in vitro. Clindamycin   is susceptible and could be used when indicated, however, erythromycin is resistant and   should not be used.  *  Moderate growth  Coagulase negative  Staphylococcus  Susceptibility testing not routinely done  * >100,000 colonies/mL  Staphylococcus saprophyticus  * No growth 50,000 to 100,000 colonies/mL  Staphylococcus aureus  *  50,000 to 100,000 colonies/mL  Coagulase negative Staphylococcus  * No Beta Streptococcus isolated

## 2019-05-10 NOTE — PROGRESS NOTES
Foot & Ankle Surgery  May 9, 2019    S:  Patient seen at bedside earlier today POD#2 sp washout left ankle.  No acute issues.    O:  /82 (BP Location: Right arm)   Pulse 107   Temp 97.5  F (36.4  C) (Oral)   Resp 16   Wt 70.7 kg (155 lb 14.4 oz)   LMP 04/13/2019   SpO2 99%   BMI 27.62 kg/m    PE - sutures intact, skin margins well coapted.  No necrosis, dehiscence.  Does not appear erythematous.    All cultures:  Recent Labs   Lab 05/07/19  1614   CULT Moderate growth  Enterobacter cloacae complex  *  Culture negative monitoring continues     A/P:  22 yo female POD #2 sp washout left ankle for dehisced lateral ankle incision and recurrent/new infection    -reviewed results  -dressing changed  -WBAT in short walking boot  -no further surgery planned at this time  -abx plan per ID  -Dr Hunt to f/u tomorrow    Best Penny DPM, FACFAS  Pager: [phone]

## 2019-05-13 ENCOUNTER — TELEPHONE (OUTPATIENT)
Dept: PODIATRY | Facility: CLINIC | Age: 24
End: 2019-05-13

## 2019-05-13 ENCOUNTER — PATIENT OUTREACH (OUTPATIENT)
Dept: CARE COORDINATION | Facility: CLINIC | Age: 24
End: 2019-05-13

## 2019-05-13 DIAGNOSIS — T81.49XA SURGICAL SITE INFECTION: Primary | ICD-10-CM

## 2019-05-13 NOTE — LETTER
[place] CARE COORDINATION  ***  May 13, 2019    Samara Oropeza  [address]    [place] [address]      Dear Samara,    I am a clinic care coordinator who works with EVI Cardona CNP at ***. {ccoutreach:[id number]} I wanted to introduce myself and provide you with my contact information so that you can call me with questions or concerns about your health care. Below is a description of clinic care coordination and how I can further assist you.     The clinic care coordinator is a registered nurse and/or  who understand the health care system. The goal of clinic care coordination is to help you manage your health and improve access to the [facility] system in the most efficient manner. The registered nurse can assist you in meeting your health care goals by providing education, coordinating services, and strengthening the communication among your providers. The  can assist you with financial, behavioral, psychosocial, chemical dependency, counseling, and/or psychiatric resources.    Please feel free to contact me at ***, with any questions or concerns. We at [facility] are focused on providing you with the highest-quality healthcare experience possible and that all starts with you.     Sincerely,     Calli Cam    Enclosed: {ccenclosed:[id number]}

## 2019-05-13 NOTE — PROGRESS NOTES
Clinic Care Coordination Contact    Clinic Care Coordination Contact  OUTREACH    Referral Information:  Referral Source: [employer] Outreach         Chief Complaint   Patient presents with     Clinic Care Coordination - Post Hospital     RN        Temple Utilization:      Utilization    Last refreshed: 5/11/2019  7:23 AM:  Hospital Admissions 3           Last refreshed: 5/11/2019  7:23 AM:  ED Visits 6           Last refreshed: 5/11/2019  7:23 AM:  No Show Count (past year) 11              Current as of: 5/11/2019  7:23 AM              Clinical Concerns:  Current Medical Concerns:  S/p ankle surgery for wound dehiscence    Current Behavioral Concerns: stable    Education Provided to patient: none      Health Maintenance Reviewed:    Clinical Pathway: None    Medication Management:  States she is on her way to an appointment with [facility] for medications. Does not give details     Functional Status:  Bed or wheelchair confined:: No    Living Situation:  Current living arrangement:: I live in a private home  Type of residence:: Private home - stairs    Diet/Exercise/Sleep:       Transportation:      boyfriend     Psychosocial:  Hindu or spiritual beliefs that impact treatment:: No  Mental health DX:: Yes  Mental health DX how managed:: Medication, Psychiatrist  Mental health management concern (GOAL):: No  Informal Support system:: Significant other     Financial/Insurance:      Not discussed     Resources and Interventions:  Current Resources:    ;         Equipment Currently Used at Home: crutches    Advance Care Plan/Directive  Advanced Care Plans/Directives on file:: No    Referrals Placed: Disability Linkage line(sent information to patient )     Goals:   Goals        General    #1 Financial Wellbeing (pt-stated)     Notes - Note created  3/28/2019  2:58 PM by Maeve Purcell BSW    Goal Statement: I need help applying for disability    Measure of Success: Pt will apply for disability   Supportive Steps to  Achieve: care coordination   Barriers: unable to navigate on own  Strengths: seeking help  Date to Achieve By: 5/1/19   Patient expressed understanding of goal: yes                  Patient/Caregiver understanding: good    Outreach Frequency: monthly  Future Appointments              In 1 week Andres Johnson DPM [facility] PODIATRY, FS - [place]    In 1 week Blayne Alexandre MD [facility] and Infectious Diseases, [facility]    In 2 weeks Ernestina Patel [facility], [facility]    In 1 month Austen Marquez MD [facility]    In 2 months Alejandrina Hernandez MD [facility] Physical Medicine and Rehabilitation, [facility]    In 5 months Joseph De La Torre MD [facility] Multiple Sclerosis, [facility]          Plan: missed her appointment at [facility] to establish care since she was in the hospital at the time of the appointment. Is considering rescheduling. Has appointments with podiatry and ID. Follow up one month. She agrees with plan.     Teresa Cam RN  [facility]-Care Coordination  [facility]  [phone]

## 2019-05-13 NOTE — PROGRESS NOTES
This is a recent snapshot of the patient's [facility] medical record.  For current drug dose and complete information and questions, call [phone] or In Basket pool, [facility] home infusion ([id number])  CSN Number:  [id number]

## 2019-05-13 NOTE — LETTER
[facility]  Complex Care Plan  About Me:    Patient Name:  Samara Bah    YOB: 1995  Age:         23 year old   Codie MRN:    [id number] Telephone Information:  Home Phone [phone]   Mobile [phone]       Address:  [address]  [address]  [place] [address] Email address:  [email or web address]      Emergency Contact(s)    Name Relationship Lgl Grd Work Phone Home Phone Mobile Phone   1. MARIXA CLARKE* Mother No  [phone]   2. MALGORZATA BAH Father No  [phone]   3. HOLLY CLARKE* Step parent No  [phone]   4. KENIA PERRY* Grandparent No  [phone]           Primary language:  English     needed? No   [place] [facility]:  [phone] op. 1  Other communication barriers:    Preferred Method of Communication:  Robert  Current living arrangement: I live in a private home  Mobility Status/ Medical Equipment:      Health Maintenance  Health Maintenance Reviewed:      My Access Plan  Medical Emergency 911   Primary Clinic Line [facility] [place] [facility] - [phone]   24 Hour Appointment Line [phone] or  [phone]) (toll-free)   24 Hour Nurse Line [phone] (toll-free)   Preferred Urgent Care     Preferred Hospital     Preferred Pharmacy [facility] - [address]     Behavioral Health Crisis Line The National Suicide Prevention Lifeline at 1-173.259.4819 or 911             My Care Team Members  Patient Care Team       Relationship Specialty Notifications Start End    Sonja Abreu APRN CNP PCP - General Nurse Practitioner  11/3/17     Phone: [phone] Fax: [phone]         [address] [address]    Jennifer Acevedo MD MD Orthopedics  7/16/13     Phone: [phone] Fax: [phone]         [address] [facility] [address]    Lilliana Miramontes  Stanislavivna, APRN CNP Nurse Practitioner Nurse Practitioner  5/11/15     Phone: [phone] Fax: [phone]         [address] [place] [address]    Cristy Russell, RN Nurse Coordinator Neurology Admissions 6/18/15     General Neurology    Phone: [phone] Pager: [phone] Fax: [phone]       Austen Marquez MD MD Rheumatology Admissions 9/16/15     Phone: [phone] Fax: [phone]         [address] [place] [address]    Umer Heaton MD MD Ophthalmology  1/11/16     Phone: [phone] Fax: [phone]         [address] [address]    Suzy Harman MD MD Family Medicine - Sports Medicine  1/19/16     Phone: [phone] Fax: [phone]         [address] [place] [address]    Esau Elliott MD MD Dermatology  9/14/16     Phone: [phone] Fax: [phone]         [address] [place] [address]    Frederick Bender MD MD Physical Medicine and Rehab  4/4/17     Phone: [phone] Fax: [phone]         [address] [place] [address]    Vidya Bryson, RN Clinic Care Coordinator Physical Medicine and Rehab  4/4/17     Phone: [phone] Pager: [phone] Fax: [phone]        [facility] [address] [facility] [address] [facility] [address]    Marcelle Richardson, PT Physical Therapist Physical Medicine and Rehab  4/4/17     Phone: [phone] Fax: [phone]         [address] [facility] [address] [facility] [address]    Cata Hurst, NP Nurse Practitioner Nurse Practitioner Psych/Mental Health  6/27/17     Phone: [phone] Fax: [phone]          CLINICS [address] [facility] [address]    Sonja Abreu APRN CNP Assigned PCP   7/2/17     Phone: [phone] Fax: [phone]         [address] [facility] [address] [facility] [address]    HUSSEIN Lynn MD MD Cardiology  2/27/18     Phone: [phone] Fax: [phone]          [address] [facility] [address] [facility] [address]    Calli Cam, RN Lead Care Coordinator Primary Care - CC Admissions 4/2/19     Phone: [phone] Fax: [phone]                My Care Plans  Self Management and Treatment Plan  Goals and (Comments)  Goals        General    #1 Financial Wellbeing (pt-stated)     Notes - Note created  3/28/2019  2:58 PM by Maeve Purcell BSW    Goal Statement: I need help applying for disability    Measure of Success: Pt will apply for disability   Supportive Steps to Achieve: care coordination   Barriers: unable to navigate on own  Strengths: seeking help  Date to Achieve By: 5/1/19   Patient expressed understanding of goal: yes                 Action Plans on File:            Depression     Migraine    Advance Care Plans/Directives Type:          Current Medications and Allergies:  See printed Medication Report.    Care Coordination Start Date: 3/21/2019   Frequency of Care Coordination: monthly   Form Last Updated: 05/13/2019

## 2019-05-13 NOTE — TELEPHONE ENCOUNTER
Samara Oropeza is a 23 year old female who left voicemail stating she was just discharged from the hospital. She needs to be worked in with Dr. Johnson tomorrow. It is the only day she is available.     Discussed with Katherine Jeffries CMA. Per Dr. Johnson, patient was already seen for one week post op in the hospital so ok to wait until next week on 5/20 or 5/21/19.    Phone call to patient and informed of the above. Appointment scheduled for 5/21/19.     BRIDGER Juares RN

## 2019-05-14 ENCOUNTER — MEDICAL CORRESPONDENCE (OUTPATIENT)
Dept: HEALTH INFORMATION MANAGEMENT | Facility: CLINIC | Age: 24
End: 2019-05-14

## 2019-05-14 LAB
AST SERPL W P-5'-P-CCNC: 20 U/L (ref 0–45)
BACTERIA SPEC CULT: NORMAL
BASOPHILS # BLD AUTO: 0 10E9/L (ref 0–0.2)
BASOPHILS NFR BLD AUTO: 0.2 %
BUN SERPL-MCNC: 12 MG/DL (ref 7–30)
CREAT SERPL-MCNC: 0.6 MG/DL (ref 0.52–1.04)
CRP SERPL-MCNC: 3.7 MG/L (ref 0–8)
DIFFERENTIAL METHOD BLD: ABNORMAL
EOSINOPHIL # BLD AUTO: 0.1 10E9/L (ref 0–0.7)
EOSINOPHIL NFR BLD AUTO: 2.4 %
ERYTHROCYTE [DISTWIDTH] IN BLOOD BY AUTOMATED COUNT: 13.2 % (ref 10–15)
ERYTHROCYTE [SEDIMENTATION RATE] IN BLOOD BY WESTERGREN METHOD: 10 MM/H (ref 0–20)
GFR SERPL CREATININE-BSD FRML MDRD: >90 ML/MIN/{1.73_M2}
HCT VFR BLD AUTO: 33.2 % (ref 35–47)
HGB BLD-MCNC: 10.9 G/DL (ref 11.7–15.7)
IMM GRANULOCYTES # BLD: 0 10E9/L (ref 0–0.4)
IMM GRANULOCYTES NFR BLD: 0.2 %
LYMPHOCYTES # BLD AUTO: 2 10E9/L (ref 0.8–5.3)
LYMPHOCYTES NFR BLD AUTO: 40 %
Lab: NORMAL
MCH RBC QN AUTO: 28.9 PG (ref 26.5–33)
MCHC RBC AUTO-ENTMCNC: 32.8 G/DL (ref 31.5–36.5)
MCV RBC AUTO: 88 FL (ref 78–100)
MONOCYTES # BLD AUTO: 0.2 10E9/L (ref 0–1.3)
MONOCYTES NFR BLD AUTO: 4.2 %
NEUTROPHILS # BLD AUTO: 2.7 10E9/L (ref 1.6–8.3)
NEUTROPHILS NFR BLD AUTO: 53 %
NRBC # BLD AUTO: 0 10*3/UL
NRBC BLD AUTO-RTO: 0 /100
PLATELET # BLD AUTO: 243 10E9/L (ref 150–450)
RBC # BLD AUTO: 3.77 10E12/L (ref 3.8–5.2)
SPECIMEN SOURCE: NORMAL
WBC # BLD AUTO: 5 10E9/L (ref 4–11)

## 2019-05-14 PROCEDURE — 84450 TRANSFERASE (AST) (SGOT): CPT | Performed by: INTERNAL MEDICINE

## 2019-05-14 PROCEDURE — 82565 ASSAY OF CREATININE: CPT | Performed by: INTERNAL MEDICINE

## 2019-05-14 PROCEDURE — 84520 ASSAY OF UREA NITROGEN: CPT | Performed by: INTERNAL MEDICINE

## 2019-05-14 PROCEDURE — 85025 COMPLETE CBC W/AUTO DIFF WBC: CPT | Performed by: INTERNAL MEDICINE

## 2019-05-14 PROCEDURE — 85652 RBC SED RATE AUTOMATED: CPT | Performed by: INTERNAL MEDICINE

## 2019-05-14 PROCEDURE — 86140 C-REACTIVE PROTEIN: CPT | Performed by: INTERNAL MEDICINE

## 2019-05-15 ENCOUNTER — HOME INFUSION (PRE-WILLOW HOME INFUSION) (OUTPATIENT)
Dept: PHARMACY | Facility: CLINIC | Age: 24
End: 2019-05-15

## 2019-05-16 NOTE — PROGRESS NOTES
This is a recent snapshot of the patient's [facility] medical record.  For current drug dose and complete information and questions, call [phone] or In Basket pool, [facility] home infusion ([id number])  CSN Number:  [id number]

## 2019-05-21 ENCOUNTER — MEDICAL CORRESPONDENCE (OUTPATIENT)
Dept: PHARMACY | Facility: CLINIC | Age: 24
End: 2019-05-21

## 2019-05-21 ENCOUNTER — OFFICE VISIT (OUTPATIENT)
Dept: PODIATRY | Facility: CLINIC | Age: 24
End: 2019-05-21
Payer: COMMERCIAL

## 2019-05-21 VITALS — BODY MASS INDEX: 27.62 KG/M2 | HEIGHT: 63 IN | RESPIRATION RATE: 16 BRPM | WEIGHT: 155.9 LBS

## 2019-05-21 DIAGNOSIS — Z98.890 S/P FOOT SURGERY, LEFT: Primary | ICD-10-CM

## 2019-05-21 LAB
AST SERPL W P-5'-P-CCNC: 21 U/L (ref 0–45)
BASOPHILS # BLD AUTO: 0 10E9/L (ref 0–0.2)
BASOPHILS NFR BLD AUTO: 0.3 %
BUN SERPL-MCNC: 6 MG/DL (ref 7–30)
CK SERPL-CCNC: 99 U/L (ref 30–225)
CREAT SERPL-MCNC: 0.63 MG/DL (ref 0.52–1.04)
CRP SERPL-MCNC: <2.9 MG/L (ref 0–8)
DIFFERENTIAL METHOD BLD: ABNORMAL
EOSINOPHIL # BLD AUTO: 0.2 10E9/L (ref 0–0.7)
EOSINOPHIL NFR BLD AUTO: 3 %
ERYTHROCYTE [DISTWIDTH] IN BLOOD BY AUTOMATED COUNT: 13 % (ref 10–15)
ERYTHROCYTE [SEDIMENTATION RATE] IN BLOOD BY WESTERGREN METHOD: 8 MM/H (ref 0–20)
GFR SERPL CREATININE-BSD FRML MDRD: >90 ML/MIN/{1.73_M2}
HCT VFR BLD AUTO: 32.9 % (ref 35–47)
HGB BLD-MCNC: 10.6 G/DL (ref 11.7–15.7)
IMM GRANULOCYTES # BLD: 0 10E9/L (ref 0–0.4)
IMM GRANULOCYTES NFR BLD: 0.2 %
LYMPHOCYTES # BLD AUTO: 2.8 10E9/L (ref 0.8–5.3)
LYMPHOCYTES NFR BLD AUTO: 42.1 %
MCH RBC QN AUTO: 28.5 PG (ref 26.5–33)
MCHC RBC AUTO-ENTMCNC: 32.2 G/DL (ref 31.5–36.5)
MCV RBC AUTO: 88 FL (ref 78–100)
MONOCYTES # BLD AUTO: 0.4 10E9/L (ref 0–1.3)
MONOCYTES NFR BLD AUTO: 5.9 %
NEUTROPHILS # BLD AUTO: 3.2 10E9/L (ref 1.6–8.3)
NEUTROPHILS NFR BLD AUTO: 48.5 %
NRBC # BLD AUTO: 0 10*3/UL
NRBC BLD AUTO-RTO: 0 /100
PLATELET # BLD AUTO: 268 10E9/L (ref 150–450)
RBC # BLD AUTO: 3.72 10E12/L (ref 3.8–5.2)
WBC # BLD AUTO: 6.6 10E9/L (ref 4–11)

## 2019-05-21 PROCEDURE — 84520 ASSAY OF UREA NITROGEN: CPT | Performed by: INTERNAL MEDICINE

## 2019-05-21 PROCEDURE — 82565 ASSAY OF CREATININE: CPT | Performed by: INTERNAL MEDICINE

## 2019-05-21 PROCEDURE — 86140 C-REACTIVE PROTEIN: CPT | Performed by: INTERNAL MEDICINE

## 2019-05-21 PROCEDURE — 85025 COMPLETE CBC W/AUTO DIFF WBC: CPT | Performed by: INTERNAL MEDICINE

## 2019-05-21 PROCEDURE — 99024 POSTOP FOLLOW-UP VISIT: CPT | Performed by: PODIATRIST

## 2019-05-21 PROCEDURE — 84450 TRANSFERASE (AST) (SGOT): CPT | Performed by: INTERNAL MEDICINE

## 2019-05-21 PROCEDURE — 85652 RBC SED RATE AUTOMATED: CPT | Performed by: INTERNAL MEDICINE

## 2019-05-21 PROCEDURE — 82550 ASSAY OF CK (CPK): CPT | Performed by: INTERNAL MEDICINE

## 2019-05-21 RX ORDER — OXYCODONE AND ACETAMINOPHEN 5; 325 MG/1; MG/1
TABLET ORAL
Qty: 12 TABLET | Refills: 0 | Status: SHIPPED | OUTPATIENT
Start: 2019-05-21 | End: 2019-06-12

## 2019-05-21 ASSESSMENT — MIFFLIN-ST. JEOR: SCORE: 1426.29

## 2019-05-21 NOTE — PROGRESS NOTES
"Foot & Ankle Surgery  May 21, 2019    S:  Patient in today approx 2 weeks sp washout/I&D left ankle with full closure.  Pain levels include sharp pains.  Taking tylenol without improvement.  Dr Tigist ALVES has her on ertapenem via PICC, plan for 6 weeks.  Full WB in CAM    Resp 16   Ht 1.6 m (5' 3\")   Wt 70.7 kg (155 lb 14.4 oz)   BMI 27.62 kg/m        ROS - positive for CC.  Patient denies current nausea, vomiting, chills, fevers, belly pain, calf pain, chest pain or SOB.  Complete remainder of ROS is otherwise neg.    PE - sutures intact, skin margins well coapted.  Minimal edema, no erythema/drainage.  Skin shows no trophic, color or temperature changes otherwise.  No calf redness, swelling or pain noted otherwise.    Imaging - MRI L ankle 5/6/19 - IMPRESSION: There is a just over 2 cm gas collection in the  subcutaneous tissues anterior to the lateral malleolus. However, no  distinct drainable abscess or fluid collection is seen and there is no  evidence of osteomyelitis.      Cultures -   Culture Micro Abnormal  05/07/2019  4:14    Moderate growth   Enterobacter cloacae complex        A/P - 23 year old yo patient approx 2 weeks sp above procedure  -will keep sutures intact at least 1 more week, based on delayed healing in the past  -IV abx per ID  -WBAT in walking boot  -Rx for percocet for pain control; pain med allergies, has tolerated oxycodone in the past  -discussed that once the infection has resolved, and MRI indicates no osteomyelitis, we'll discuss stabilization of the ankle    Follow up  -  1 week or sooner with acute issues      Body mass index is 27.62 kg/m .  Weight management plan: Patient was referred to their PCP to discuss a diet and exercise plan.      Andres Johnson, ROSIBEL FACFAS FACFAOM  Podiatric Foot & Ankle Surgeon  [facility]  [phone]    "

## 2019-05-23 ENCOUNTER — OFFICE VISIT (OUTPATIENT)
Dept: INFECTIOUS DISEASES | Facility: CLINIC | Age: 24
End: 2019-05-23
Attending: INTERNAL MEDICINE
Payer: COMMERCIAL

## 2019-05-23 VITALS
SYSTOLIC BLOOD PRESSURE: 114 MMHG | HEIGHT: 63 IN | BODY MASS INDEX: 27.11 KG/M2 | HEART RATE: 93 BPM | WEIGHT: 153 LBS | TEMPERATURE: 97.8 F | OXYGEN SATURATION: 100 % | DIASTOLIC BLOOD PRESSURE: 76 MMHG

## 2019-05-23 DIAGNOSIS — A49.8 INFECTION DUE TO ENTEROBACTER CLOACAE: ICD-10-CM

## 2019-05-23 DIAGNOSIS — M86.172 ACUTE OSTEOMYELITIS OF LEFT FOOT (H): Primary | ICD-10-CM

## 2019-05-23 DIAGNOSIS — Z88.1 ALLERGY TO MULTIPLE ANTIBIOTICS: ICD-10-CM

## 2019-05-23 PROCEDURE — G0463 HOSPITAL OUTPT CLINIC VISIT: HCPCS | Mod: ZF

## 2019-05-23 RX ORDER — LEVOFLOXACIN 750 MG/1
750 TABLET, FILM COATED ORAL DAILY
Qty: 28 TABLET | Refills: 0 | Status: SHIPPED | OUTPATIENT
Start: 2019-05-23 | End: 2019-06-25

## 2019-05-23 ASSESSMENT — ENCOUNTER SYMPTOMS
HYPERTENSION: 1
SEIZURES: 0
NECK PAIN: 1
PARALYSIS: 0
BLOOD IN STOOL: 0
BLOATING: 1
TINGLING: 1
HEARTBURN: 1
SYNCOPE: 1
FEVER: 1
SWOLLEN GLANDS: 0
ARTHRALGIAS: 1
MUSCLE WEAKNESS: 1
LIGHT-HEADEDNESS: 1
MEMORY LOSS: 1
EYE IRRITATION: 1
CONSTIPATION: 1
JOINT SWELLING: 1
NAUSEA: 1
MYALGIAS: 1
ABDOMINAL PAIN: 1
DEPRESSION: 0
HEADACHES: 1
DIARRHEA: 1
HOT FLASHES: 0
STIFFNESS: 1
INSOMNIA: 1
WEIGHT LOSS: 1
MUSCLE CRAMPS: 1
VOMITING: 1
DECREASED APPETITE: 1
EYE WATERING: 1
SLEEP DISTURBANCES DUE TO BREATHING: 1
ORTHOPNEA: 0
SKIN CHANGES: 0
PALPITATIONS: 1
DOUBLE VISION: 1
WEIGHT GAIN: 1
TREMORS: 0
BOWEL INCONTINENCE: 0
BACK PAIN: 1
LEG PAIN: 1
HYPOTENSION: 0
DECREASED CONCENTRATION: 1
WEAKNESS: 1
LOSS OF CONSCIOUSNESS: 1
PANIC: 1
POLYDIPSIA: 1
ALTERED TEMPERATURE REGULATION: 1
EYE PAIN: 1
JAUNDICE: 0
EYE REDNESS: 1
SINUS CONGESTION: 1
TROUBLE SWALLOWING: 1
HOARSE VOICE: 1
RECTAL PAIN: 0
DISTURBANCES IN COORDINATION: 1
DECREASED LIBIDO: 0
SPEECH CHANGE: 0
TASTE DISTURBANCE: 1
EXERCISE INTOLERANCE: 1
DIZZINESS: 1
NUMBNESS: 1
SORE THROAT: 0
NERVOUS/ANXIOUS: 1
SINUS PAIN: 1
BRUISES/BLEEDS EASILY: 1

## 2019-05-23 ASSESSMENT — PAIN SCALES - GENERAL: PAINLEVEL: MODERATE PAIN (5)

## 2019-05-23 ASSESSMENT — MIFFLIN-ST. JEOR: SCORE: 1413.13

## 2019-05-23 NOTE — NURSING NOTE
"Chief Complaint   Patient presents with     RECHECK     joint of ankle     Vital signs:  Temp: 97.8  F (36.6  C) Temp src: Oral BP: 114/76 Pulse: 93     SpO2: 100 %     Height: 160 cm (5' 3\") Weight: 69.4 kg (153 lb)  Estimated body mass index is 27.1 kg/m  as calculated from the following:    Height as of this encounter: 1.6 m (5' 3\").    Weight as of this encounter: 69.4 kg (153 lb).        Danica Puente    "

## 2019-05-24 ENCOUNTER — TELEPHONE (OUTPATIENT)
Dept: INFECTIOUS DISEASES | Facility: CLINIC | Age: 24
End: 2019-05-24

## 2019-05-24 ENCOUNTER — HOME INFUSION (PRE-WILLOW HOME INFUSION) (OUTPATIENT)
Dept: PHARMACY | Facility: CLINIC | Age: 24
End: 2019-05-24

## 2019-05-24 NOTE — TELEPHONE ENCOUNTER
[facility] Call [facility]    Phone Message    May a detailed message be left on voicemail: yes    Reason for Call: Medication Question or concern regarding medication   Prescription Clarification  Name of Medication: Ertapanem  Prescribing Provider: Blayne Alexandre   Pharmacy: [facility] infusion   What on the order needs clarification? Dunia is wanting to get a call back, Dunia states Pt said Blayne Jones is wanting Pt to stop IV and start Ertapanem. Dunia is wanting to get clarification, Due to no note to confirm what Pt is stating.           Action Taken: Message routed to:  [facility] ([facility]

## 2019-05-24 NOTE — TELEPHONE ENCOUNTER
Spoke with Dr. Alexandre about this last night. He wants to stop IV abxs but keep PICC until we know that pt tolerates oral levofloxacin. LVM with Dunia to let her know.  Cara Phelps RN

## 2019-05-24 NOTE — PROGRESS NOTES
Division of Infectious Diseases and International Medicine  Department of Medicine        INFECTIOUS DISEASE CLINIC OUTPATIENT VISIT NOTE [place] Mail [address]  [address] [place] [address]  Office: [phone]  Fax:  [phone]     HISTORY OF PRESENT ILLNESS:    Ms. Oropeza is a 23-year-old woman with a history of Bechet's syndrome (on colchicine), fibromylagia, GERD, depression (recently placed on citalopram), and irritable bowel syndrome who today returns to ID Clinic to follow-up her prior recent 5/3/19 visit here regarding a left ankle MSSA / coagulase negative Staph infection and a subsequent 5/6 - 10/19 [facility] hospitalization.  She is accompanied to clinic today by her long-term boyfriend to whom she rather oddly defers in decision-making.    By was of history, she was initially s/p a left ankle ankle arthroscopic synovectomy on 1/2/19 which subsequently developed persistent wound drainage.  Her left ankle developed a post-operative infection which required I&D on 3/12/19 (with the cultures growing MSSA plus also coagulase negative Staph thought to be a possible contamint) and she was treated with home IV antibiotic therapy via [facility] -- first with IV vancomycin (due to a past history of rash / urticaria on penicillins / cephalosporins per her father), then with IV daptomycin (due to JOSE JUAN necessitating the switch on 4/1/19) -- through 5/1/19.  She has no ongoing hardware in her ankle -- that had been placed in ~ 7/13 but was removed on 3/12/19 (per the patient).  She was seen in Infectious Disease Clinic by Dr. Mehta on 4/23/19 at which time she still has some mild-moderate left inferior leg discomfort and mild lateral malleolar region palpation and ankle rotation tenderness, but was otherwise feeling well.  Her ankle incision was closed and lacked inflammation at that time.  Thus, Dr. Mehta continued the plan of completing the six week course of  vancomycin/daptomycin on 5/1/19.  The day after discontinuing the IV daptomycin, she was seen in the [facility] emergency room 5/2/19 and then seen by me for follow-up in ID Clinic on 5/3/19 because her left ankle incision partially dehisced on 5/2/19 and was draining a slight amount of fluid but did not othewise appear inflammed.  Her peripheral WBC and inflammatory markers were normal.  The IV daptomycin was restarted on 5/4/19.  A 5/3/19 left ankle x-ray series showed subcutaneous emphysema down to the bone with suspicion of possible early osteomyelitis.  She was seen by her [facility] podiatrist, Dr. Andres Johnson, and was admitted to the [facility] Orthopedics service.  An ankle MRI scan showed a 2 cm soft tissue gas collection anterior to the lateral malleolus without evidence of osteomyelitis.  Her PICC line was replaced in her left arm.  I&D was performed on the ankle of 5/7/19.  She was seen in consult by Dr. Escoto of Infectious Disease.  IV daptomycin was initially continued, but a couple of days after the surgery the intra-operative culture grew an Enterobacter cloacae rather than Staph, so the antibiotic therapy was semi-empirically switched to IV ertapenem daily, since the Enterobacter's susceptibilities were not yet reported.  She was discharged on 5/10/19 with plan to complete a six week course of ertapenem through [facility].  After discharge, the Enterobacter was reported as being pan-susceptible, including to quinolones, but the antibiotic was not adjusted as yet.    Today, she reports that the PICC line has functioned well but that the anchoring brace of the PICC line is painful in some left arm positions.  She has not missed any ertapenem doses.  She says she feels nauseated and quite fatigued for two to four hours (starting half way through the dose) every day after taking the ertapenem dose about 2 - 3:30 PM, but that her energy level is generally back to her  "baseline by the next morning and she is able to eat each evening.  She has not had any emesis or diarrhea beyond her baseline.  She says she always feels somewhat fatigued and \"gross\", so the additional side effect after the ertapenem dose is not a complete change for her and that she has always been prone to some degree of GI discomfort with antibiotic courses.  She was most recently seen for post-procedure outpatient follow-up by her podiatrist, Dr. Johnson, two days ago on 5/2/1/19 and he felt the left ankle wound was healing normally.  He kept the sutures in place and plans to remove the sutures next week (because of a history of delayed healing in the past).  She has some ongoing foot pain (on the \"top of the foot\") for which he provided some oral analgesics.  She continues to wear a protective left boot.  In other respects, she feels baseline well and reports no recent fever, chills, sweats, unusual anorexia (often has a weak appetite in the early part of the day), unusual fatigue, rash, new myalgias/arthralgias elsewhere, cough, dyspnea, new EENT symptoms, chest pain, unusual diarrhea, abdominal pain, genitourinary symptoms, unusual headache or other new neurological symptoms.    PAST MEDICAL HISTORY:  Past Medical History:   Diagnosis Date     Anemia      Anxiety      Arthritis      Behcet's disease (H)      Cervical adenitis May 2010     Chronic abdominal pain      Constipation, chronic 1994     Fibromyalgia      Gastro-oesophageal reflux disease      Gastroparesis      Irregular heart beat     tachycardia, has had workup     Migraines      Neuromuscular disorder (H)     fibramyalgia     Palpitations      Seizure (H)      Seizures (H)     unknown etiology     Syncope      Tourette's      Past Surgical History:   Procedure Laterality Date     ARTHROSCOPY ANKLE, REPAIR LIGAMENT Left 1/2/2019    Procedure: Ankle arthroscopy and sinus tarsi evacuation, ligament repair, left lower extremity;  Surgeon: " Andres Johnson DPM;  Location: [facility] OR     ARTHROSCOPY KNEE WITH PATELLAR REALIGNMENT  7/25/2013    Procedure: ARTHROSCOPY KNEE WITH PATELLAR REALIGNMENT;  Left Knee Arthroscopy, Medial Patellofemoral Ligament Reconstruction with Allograft  ;  Surgeon: Jennifer Acevedo MD;  Location: [facility] OR     COLONOSCOPY  2015     DENTAL SURGERY  1996    Teeth removal     ENDOSCOPY UPPER, COLONOSCOPY, COMBINED  2005     HC ESOPH/GAS REFLUX TEST W NASAL IMPED >1 HR N/A 2/15/2017    Procedure: ESOPHAGEAL IMPEDENCE FUNCTION TEST WITH 24 HOUR PH GREATER THAN 1 HOUR;  Surgeon: Timothy Matta MD;  Location: [facility] GI     IR PICC PLACEMENT > 5 YRS OF AGE  3/13/2019     IRRIGATION AND DEBRIDEMENT FOOT, COMBINED Left 3/12/2019    Procedure: COMBINED IRRIGATION AND DEBRIDEMENT LEFT ANKLE;  Surgeon: Micha Glover MD;  Location: [facility] OR     IRRIGATION AND DEBRIDEMENT LOWER EXTREMITY, COMBINED Left 5/7/2019    Procedure: 1.  Excision of wound down to and including deep fascia, less than 20 cm2.  2.  Irrigation and debridement, left ankle.;  Surgeon: Andres Johnson DPM;  Location: [facility] OR     PICC INSERTION Left 05/05/2019    4Fr - 45cm (5cm external), Basilic vein, SVC RA junction     ALLERGIES:  Allergies   Allergen Reactions     Amoxil [Penicillins] Rash     Dad unsure of reaction.     Bee Venom Anaphylaxis     Contrast Dye Rash     Contrast Media [facility] [place] Contrast Media [facility] [place]09[place].  NOTE: this is a contrast media oral with iodine. Premedicate with methylpred standard for IV contrast, request barium contrast for oral contrast.     Orange Fruit [Citrus] Anaphylaxis     Pineapple Anaphylaxis, Difficulty breathing and Rash     Reglan [Metoclopramide] Other (See Comments)     IV dose only, in ER, rapid heart rate.     Ace Inhibitors      Difficulty in breathing and GI upset     Amitiza [Lubiprostone] Nausea and Vomiting     Amoxicillin-Pot Clavulanate      Midazolam Unknown     Other  reaction(s): Unknown  parent states that when pt takes this medication, she wakes up being very violent .  parent states that when pt takes this medication, she wakes up being very violent .     No Clinical Screening - See Comments      Bleech/ chest tightness, itchy throat, swollen tongue, hives     Tizanidine Other (See Comments)     Confusion, back pain, photophobia, abdominal pain, shaking, anxious       Versed      Coming out of pelvic exam at age of 5, was kicking and screaming when coming out of the versed.     Adhesive Tape Rash     Azithromycin Hives and Rash     Cephalexin Itching and Rash     Itchy mouth  Itchy mouth     Keflex [Cephalexin-Fd&C Yellow #6] Hives     MEDICATIONS:  Current Outpatient Medications   Medication Sig Dispense Refill     albuterol (PROAIR HFA/PROVENTIL HFA/VENTOLIN HFA) 108 (90 BASE) MCG/ACT Inhaler Inhale 2 puffs into the lungs every 6 hours as needed for shortness of breath / dyspnea or wheezing 1 Inhaler 1     amitriptyline (ELAVIL) 25 MG tablet Take 1 tablet (25 mg) by mouth At Bedtime 30 tablet 1     artificial tears OINT ophthalmic ointment 0.5 inch strip each eye at night 1 Tube 11     aspirin (ASPIRIN CHILDRENS) 81 MG chewable tablet Take 81 mg by mouth as needed        benzocaine (TOPICALE XTRA) 20 % GEL Apply as needed locally to mouth or nasal ulcers for pain; 4 times daily as needed 30 g 1     betamethasone valerate (VALISONE) 0.1 % cream Apply topically 2 times daily as needed        bisacodyl (DULCOLAX) 5 MG EC tablet Take 2 tablets (10 mg) by mouth daily as needed for constipation 30 tablet 0     citalopram (CELEXA) 10 MG tablet Take 1 tablet (10 mg) by mouth daily 30 tablet 1     clobetasol (TEMOVATE) 0.05 % cream Apply topically 2 times daily 60 g 0     colchicine (COLCYRS) 0.6 MG tablet Take 0.6 mg by mouth 2 times daily       cyproheptadine (PERIACTIN) 4 MG tablet Take 1 tablet (4 mg) by mouth At Bedtime For nightmares 30 tablet 2     dicyclomine (BENTYL) 20 MG  tablet Take 1 tablet (20 mg) by mouth 4 times daily as needed 120 tablet 3     diphenhydrAMINE (BENADRYL) 25 MG tablet Take 1 tablet (25 mg) by mouth 3 times daily as needed (itching) 40 tablet 1     diphenhydrAMINE (BENADRYL) 50 MG capsule Take 1 capsule (50 mg) by mouth every 6 hours as needed for itching or allergies 100 capsule 0     EPINEPHrine (EPIPEN 2-EAMON) 0.3 MG/0.3ML injection Inject 0.3 mLs (0.3 mg) into the muscle as needed for anaphylaxis 0.6 mL 3     ertapenem (INVANZ) 1 GM injection Inject 1 g into the vein every 24 hours ESR,CRP,CBC with differential, creatinine, SGOT weekly while on this medication to be faxed to Dr. Escoto office. 400 mL 0     gabapentin (NEURONTIN) 100 MG capsule Take 1 capsule (100 mg) by mouth 3 times daily as needed (anxiety) 90 capsule 1     guanFACINE (TENEX) 1 MG tablet Take 3 tablets (3 mg) by mouth twice daily in the morning and evening. 180 tablet 0     hydrocortisone 1 % CREA cream Place rectally 2 times daily as needed for itching 28.35 g 1     hyoscyamine (ANASPAZ/LEVSIN) 0.125 MG tablet Take 1-2 tablets (125-250 mcg) by mouth every 4 hours as needed for cramping 40 tablet 1     hypromellose (GENTEAL) 0.3 % SOLN 1 drop every hour as needed for dry eyes        lactulose 20 GM/30ML SOLN Take 30 mLs by mouth 3 times daily as needed (for constipation) 300 mL 3     levofloxacin (LEVAQUIN) 750 MG tablet Take 1 tablet (750 mg) by mouth daily for 28 days 28 tablet 0     lidocaine (LMX4) 4 % external cream Apply topically once as needed for mild pain       linaclotide (LINZESS) 290 MCG capsule Take 1 capsule (290 mcg) by mouth every morning (before breakfast) 90 capsule 3     LORazepam (ATIVAN) 0.5 MG tablet Take 1 tablet (0.5 mg) by mouth every 6 hours as needed for anxiety 10 tablet 0     Magic Mouthwash (FV std formula) lidocaine visc 2% 2.5mL/5mL & maalox/mylanta w/ simeth 2.5mL/5mL & diphenhydrAMINE 5mg/5mL Swish and swallow 10 mLs in mouth every 6 hours as needed for  mouth sores 1 Bottle 1     omeprazole (PRILOSEC) 40 MG capsule Take 1 capsule (40 mg) by mouth daily 90 capsule 3     ondansetron (ZOFRAN-ODT) 8 MG ODT tab Take 1 tablet (8 mg) by mouth every 8 hours as needed for nausea 90 tablet 1     order for DME Equipment being ordered: Other: Knee scooter  Treatment Diagnosis: infected left ankle joint 1 each 0     order for DME Roll-A-Bout Walker. Patient can use for 3 months 1 Units 0     order for DME Equipment being ordered: size 8 1/2 walking boot tall 1 Device 0     order for DME Equipment being ordered: RollAbout knee scooter 1 Device 0     order for DME Equipment being ordered: adult pair of crutches 1 Device 0     oxyCODONE (ROXICODONE) 5 MG tablet Take 1-2 tablets (5-10 mg) by mouth every 6 hours as needed for moderate to severe pain 12 tablet 0     oxyCODONE-acetaminophen (PERCOCET) 5-325 MG tablet Take 1 tablet every 6 hours as needed for pain 12 tablet 0     polyethylene glycol (MIRALAX/GLYCOLAX) powder Take 1 capful by mouth 3 times daily       sennosides (SENOKOT) 8.6 MG tablet Take 3 tablets by mouth 2 times daily 240 tablet 3     sodium chloride 0.9% SOLN BOLUS Inject 1,000 mLs into the vein daily as needed (IV abx and flushes) 1000 mL 11     SPRINTEC 28 0.25-35 MG-MCG tablet Take one tablet by mouth daily. Take continuously. 84 tablet 1     sucralfate (CARAFATE) 1 GM/10ML suspension Take 10 mLs (1 g) by mouth 4 times daily 1200 mL 2     SOCIAL HISTORY:  Social History     Socioeconomic History     Marital status: Single     Spouse name: Not on file     Number of children: Not on file     Years of education: Not on file     Highest education level: Not on file   Occupational History     Not on file   Social Needs     Financial resource strain: Not on file     Food insecurity:     Worry: Not on file     Inability: Not on file     Transportation needs:     Medical: Not on file     Non-medical: Not on file   Tobacco Use     Smoking status: Never Smoker      "Smokeless tobacco: Never Used   Substance and Sexual Activity     Alcohol use: Yes     Alcohol/week: 0.6 oz     Types: 1 Standard drinks or equivalent per week     Comment: rarely     Drug use: No     Types: Marijuana     Comment: occassional marijuana only, denies others     Sexual activity: Not Currently     Partners: Male     Birth control/protection: Pill   Lifestyle     Physical activity:     Days per week: Not on file     Minutes per session: Not on file     Stress: Not on file   Relationships     Social connections:     Talks on phone: Not on file     Gets together: Not on file     Attends Zoroastrianism service: Not on file     Active member of club or organization: Not on file     Attends meetings of clubs or organizations: Not on file     Relationship status: Not on file     Intimate partner violence:     Fear of current or ex partner: Not on file     Emotionally abused: Not on file     Physically abused: Not on file     Forced sexual activity: Not on file   Other Topics Concern     Parent/sibling w/ CABG, MI or angioplasty before 65F 55M? Not Asked   Social History Narrative    Boyfriend of 5 years, living with \"in laws\" Oct 2017 and looking forward to their own apartment.      FAMILY HISTORY:  Family History   Problem Relation Age of Onset     Depression Mother      Neurologic Disorder Mother         Migraines, take imitrex injection.  Also in maternal grandmother.       Alcohol/Drug Father      Hypertension Father      Depression Father      Osteoarthritis Father      Cardiovascular Maternal Grandmother      Depression Maternal Grandmother      Hypertension Maternal Grandmother      Alzheimer Disease Maternal Grandmother      Cardiovascular Maternal Grandfather      Hypertension Maternal Grandfather      Depression Maternal Grandfather      Alcohol/Drug Maternal Grandfather      Cardiovascular Paternal Grandmother      Hypertension Paternal Grandmother      Cardiovascular Paternal Grandfather      " "Hypertension Paternal Grandfather      Glaucoma No family hx of      Macular Degeneration No family hx of      REVIEW OF SYSTEMS:  As per HPI.  Complete ROS is otherwise negative.    PHYSICAL EXAMINATION:  General:  A pleasant, conversant, somewhat taciturn, WDWN, 23-year-old woman in no evident discomfort.  Vital signs:  /76   Pulse 93   Temp 97.8  F (36.6  C) (Oral)   Ht 1.6 m (5' 3\")   Wt 69.4 kg (153 lb)   SpO2 100%   BMI 27.10 kg/m   .  Skin:  No new rash or lesion.  Left arm PICC line site lacks inflammation but there is subjective tenderness to palpation without erythema at the base of the line insertion anchor (which seems to be due to position and traction).  Head/Neck:  NCAT.  External auditory canals are bilaterally patent without discharge.  PERRL.  EOMI.  Conjunctivae are pink and without injection.  Sclerae are anicteric.  No anterior oral lesion.  Neck is supple.  Lungs:  Bilaterally clear to auscultation.  CV:  RRR.  Normal S1, S2.  Abdomen:  Bowel sounds present, soft, nontender.  Extremities:  Right leg is normal.  Left foot / ankle is in a walking boot and dressed -- wound not examined (since seen by Podiatry two days ago).  Limbs are distally warm without edema.  Neuro:  Alert, oriented, memory/cognition are normal.  Affect is mildly flat.  Gait is compromised by the left walking boot.    LABORATORY STUDIES (Reviewed with the patient during her appointment today):      CK Total 05/09/2019 87  30 - 225 U/L Final     Sodium 05/09/2019 138  133 - 144 mmol/L Final     Potassium 05/09/2019 3.9  3.4 - 5.3 mmol/L Final     Chloride 05/09/2019 105  94 - 109 mmol/L Final     Carbon Dioxide 05/09/2019 25  20 - 32 mmol/L Final     Anion Gap 05/09/2019 8  3 - 14 mmol/L Final     Glucose 05/09/2019 121* 70 - 99 mg/dL Final     Urea Nitrogen 05/09/2019 10  7 - 30 mg/dL Final     Creatinine 05/09/2019 0.72  0.52 - 1.04 mg/dL Final     GFR Estimate 05/09/2019 >90  >60 mL/min/[1.73_m2] Final    " Comment: Non  GFR Calc  Starting 12/18/2018, serum creatinine based estimated GFR (eGFR) will be   calculated using the Chronic Kidney Disease Epidemiology Collaboration   (CKD-EPI) equation.       GFR Estimate If Black 05/09/2019 >90  >60 mL/min/[1.73_m2] Final    Comment:  GFR Calc  Starting 12/18/2018, serum creatinine based estimated GFR (eGFR) will be   calculated using the Chronic Kidney Disease Epidemiology Collaboration   (CKD-EPI) equation.       Calcium 05/09/2019 8.6  8.5 - 10.1 mg/dL Final     Bilirubin Total 05/09/2019 0.3  0.2 - 1.3 mg/dL Final     Albumin 05/09/2019 3.3* 3.4 - 5.0 g/dL Final     Protein Total 05/09/2019 6.7* 6.8 - 8.8 g/dL Final     Alkaline Phosphatase 05/09/2019 64  40 - 150 U/L Final     ALT 05/09/2019 20  0 - 50 U/L Final     AST 05/09/2019 12  0 - 45 U/L Final     WBC 05/10/2019 5.1  4.0 - 11.0 10e9/L Final     RBC Count 05/10/2019 3.32* 3.8 - 5.2 10e12/L Final     Hemoglobin 05/10/2019 9.6* 11.7 - 15.7 g/dL Final     Hematocrit 05/10/2019 31.0* 35.0 - 47.0 % Final     MCV 05/10/2019 93  78 - 100 fl Final     MCH 05/10/2019 28.9  26.5 - 33.0 pg Final     MCHC 05/10/2019 31.0* 31.5 - 36.5 g/dL Final     RDW 05/10/2019 13.2  10.0 - 15.0 % Final     Platelet Count 05/10/2019 186  150 - 450 10e9/L Final       HCG Qual Urine 05/07/2019 Negative  NEG^Negative Final    Comment: This test is for screening purposes.  Results should be interpreted along with   the clinical picture.  Confirmation testing is available if warranted by   ordering [id number], HCG Quantitative Pregnancy.       Specimen Description 05/07/2019 Left Ankle Wound Fluid   Final     Special Requests 05/07/2019    Final                    Value:This specimen was received on a swab. Results may not be optimal. For maximum sensitivity   of detection, submit tissue, fluid, or needle aspirate.  Received in anaerobic tubes.  Specimen collected in eSwab transport (white cap)       Culture Micro  05/07/2019 No anaerobes isolated   Final     Specimen Description 05/07/2019 Left Ankle Wound Fluid   Final     Special Requests 05/07/2019    Final                    Value:This specimen was received on a swab. Results may not be optimal. For maximum sensitivity   of detection, submit tissue, fluid, or needle aspirate.  Specimen collected in eSwab transport (white cap)       Culture Micro 05/07/2019 *  Final                    Value:Moderate growth  Enterobacter cloacae complex       Specimen Description 05/07/2019 Left Ankle Wound Fluid   Final     Special Requests 05/07/2019    Final                    Value:This specimen was received on a swab. Results may not be optimal. For maximum sensitivity   of detection, submit tissue, fluid, or needle aspirate.  Specimen collected in eSwab transport (white cap)       Gram Stain 05/07/2019 *  Final                    Value:Rare  Gram negative rods       Gram Stain 05/07/2019    Final                    Value:Many  PMNs seen       Lab Test 05/21/19  1545 05/14/19  1340 05/02/19  1638   SED 8 10 8   CRP <2.9 3.7 <2.9     IMPRESSION/PLAN:  A 23-year-old woman with a history of Bechet's syndrome (on colchicine), fibromylagia, GERD and irritable bowel syndrome who returns to Infectious Disease Clinic today for follow-up of a left ankle MSSA / coagulase negative Staph infection s/p a left ankle ankle arthroscopic synovectomy on 1/2/19 which subsequently developed persistent wound drainage and required  I&D on 3/12/19.  She completed six weeks of IV vancomycin / daptomcyin (switched 4/1/19 for JOSE JUAN) on 5/1/19, but the wound newly dehisced on 5/2/19 AM.  She was continued on daptomycin and hospitalized at [facility] 5/6 - 10/19 on the Orthopedics service with a repeat I&D performed on 5/7/19.  Ankle x-rays and MRI scan showed no clear evidence for osteomyelitis, but intra-operative culture from 5/7/19 grew a pan-susceptible Enterobacter cloacae for which she has so far  received IV ertapenem daily since 5/9/19 (through [facility]).  She is tolerating the ertapenem adequately well although reports post-dose nausea and fatigue (which is not inconsistent with her general health or her past experience with various antibiotics).  Her ankle wound is reportedly healing appropriately (per her Podiatrist, Dr. Johnson).    At this point, she is on overly broad antibiotic therapy based on her known microbiology -- the 5/7/19 pan-susceptible Enterobacter isolate (as well as the prior, already very well-treated, 3/12/19 MSSA) can be fully treated with a bio-available oral antibiotic, such as a quinolone, with less risk of side effects and without risk of PICC line-induced bacteremia, assuming her GI tract can tolerate an oral antibiotic (at least as well as she tolerates any antibiotic including the ertapenem).  Levofloxacin should work nicely -- we will tomorrow start levofloxacin 750 mg PO daily for twenty-eight more days (to complete a full six week course of therapy from the 5/7/19 surgery in case of osteomyelitis, even though her 5/3 - 6/19 radiology did not definitively confirm the presence of osteomyelitis).  She will take the first levofloxacin dose tomorrow early afternoon, after which I will speak to her by phone early tomorrow evening to see how well she tolerated it from a GI standpoint.  If she tolerates the first levofloxacin dose adequately, I will then phone [facility] to request discontinuation of the ertapenem and removal of the PICC line.  We discussed potential oral antibiotic side effects today.  Assuming she does well, she will continue the levofloxacin through about 6/20/19 to complete a full six week antibiotic course.  Assuming she does well, she should not need additional follow-up appointments in ID Clinic, since her left ankle wound and foot status will continue to be followed by her podiatrist, Dr. Johnson, but I emphasized to the  "patient and her partner that they should contact me if any additional antibiotic or ID questions or concerns arise (and that we would schedule an add-on appointment in the future if would subsequently seems to be needed).  The patient and her partner seemed satisfied with this plan.    Of note, her prior JOSE JUAN (in 4/19 while on IV vancomycin) remains resolved with a most recent creatinine on 5/9/19 normal at 0.72.    5/24/19 Addendum:  I spoke with Ms. Oropeza this early evening by phone.  She says she felt generally not interested in eating or activity earlier today, but took the levofloxacin dose about 2:30 PM.  It made her \"feel gross\" (meaning -- some nausea) but not much different that the doses of IV ertapenem did.  She took the levofloxacin on an empty stomach, but subsequently has been able to eat twice, including a full sandwich, during the ensuing three hours.  Beyond that, she had no other ill effects from levofloxacin and feels she will be able to take all four weeks of the prescribed course.  We discussed that eating before her antibiotic dose would be prudent.  She had no additional questions or concerns over the phone today.  I gave orders to [facility] to discontinue the ertapenem and remove her PICC as soon as feasible.  "

## 2019-05-28 ENCOUNTER — OFFICE VISIT (OUTPATIENT)
Dept: PSYCHOLOGY | Facility: CLINIC | Age: 24
End: 2019-05-28
Payer: COMMERCIAL

## 2019-05-28 ENCOUNTER — OFFICE VISIT (OUTPATIENT)
Dept: PODIATRY | Facility: CLINIC | Age: 24
End: 2019-05-28
Payer: COMMERCIAL

## 2019-05-28 VITALS — WEIGHT: 153 LBS | BODY MASS INDEX: 27.11 KG/M2 | HEIGHT: 63 IN | RESPIRATION RATE: 12 BRPM

## 2019-05-28 DIAGNOSIS — L02.619 CELLULITIS AND ABSCESS OF FOOT, EXCEPT TOES: ICD-10-CM

## 2019-05-28 DIAGNOSIS — L03.119 CELLULITIS AND ABSCESS OF FOOT, EXCEPT TOES: ICD-10-CM

## 2019-05-28 DIAGNOSIS — Z98.890 S/P FOOT SURGERY, LEFT: Primary | ICD-10-CM

## 2019-05-28 DIAGNOSIS — F41.1 GAD (GENERALIZED ANXIETY DISORDER): ICD-10-CM

## 2019-05-28 DIAGNOSIS — F33.1 MAJOR DEPRESSIVE DISORDER, RECURRENT EPISODE, MODERATE (H): Primary | ICD-10-CM

## 2019-05-28 PROCEDURE — 90834 PSYTX W PT 45 MINUTES: CPT | Performed by: COUNSELOR

## 2019-05-28 PROCEDURE — 99024 POSTOP FOLLOW-UP VISIT: CPT | Performed by: PODIATRIST

## 2019-05-28 ASSESSMENT — ANXIETY QUESTIONNAIRES
6. BECOMING EASILY ANNOYED OR IRRITABLE: SEVERAL DAYS
7. FEELING AFRAID AS IF SOMETHING AWFUL MIGHT HAPPEN: SEVERAL DAYS
IF YOU CHECKED OFF ANY PROBLEMS ON THIS QUESTIONNAIRE, HOW DIFFICULT HAVE THESE PROBLEMS MADE IT FOR YOU TO DO YOUR WORK, TAKE CARE OF THINGS AT HOME, OR GET ALONG WITH OTHER PEOPLE: SOMEWHAT DIFFICULT
1. FEELING NERVOUS, ANXIOUS, OR ON EDGE: MORE THAN HALF THE DAYS
GAD7 TOTAL SCORE: 10
2. NOT BEING ABLE TO STOP OR CONTROL WORRYING: SEVERAL DAYS
5. BEING SO RESTLESS THAT IT IS HARD TO SIT STILL: MORE THAN HALF THE DAYS
3. WORRYING TOO MUCH ABOUT DIFFERENT THINGS: SEVERAL DAYS

## 2019-05-28 ASSESSMENT — PATIENT HEALTH QUESTIONNAIRE - PHQ9
5. POOR APPETITE OR OVEREATING: MORE THAN HALF THE DAYS
SUM OF ALL RESPONSES TO PHQ QUESTIONS 1-9: 9

## 2019-05-28 ASSESSMENT — MIFFLIN-ST. JEOR: SCORE: 1413.13

## 2019-05-28 NOTE — PROGRESS NOTES
"Foot & Ankle Surgery  May 28, 2019    S:  Patient in today approx 3 weeks sp I&D/excision of non-healing wound with primary closure.  Pain levels improved.  She was switched to an oral antibiotic, Levaquin, per her I&D doctor.  The plan is for 25 more days of the antibiotic    Resp 12   Ht 1.6 m (5' 3\")   Wt 69.4 kg (153 lb)   BMI 27.10 kg/m        ROS - positive for CC.  Patient denies current nausea, vomiting, chills, fevers, belly pain, calf pain, chest pain or SOB.  Complete remainder of ROS is otherwise neg.    PE - sutures intact, skin margins well coapted.  Minimal edema, no drainage/erythema.  Skin shows no trophic, color or temperature changes otherwise.  No calf redness, swelling or pain noted otherwise.    A/P - 23 year old yo patient approx 3 weeks sp above procedure  -incision not healed, will leave sutures in at least 1 more week.  -no indication for change in antibiotic course.  Surgical site, other than slow-healing incision, looks excellent  -bandage applied, keep c/d/i.  WBAT in [employer]    Follow up  -  1 week or sooner with acute issues      Body mass index is 27.1 kg/m .  Weight management plan: Patient was referred to their PCP to discuss a diet and exercise plan.      Andres Johnson DPM FACFAS FACFAOM  Podiatric Foot & Ankle Surgeon  [facility]  [phone]    "

## 2019-05-28 NOTE — PROGRESS NOTES
This is a recent snapshot of the patient's [facility] medical record.  For current drug dose and complete information and questions, call [phone] or In [facility] pool, fv home infusion ([id number])  CSN Number:  [id number]
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
## 2019-05-29 ENCOUNTER — HOME INFUSION (PRE-WILLOW HOME INFUSION) (OUTPATIENT)
Dept: PHARMACY | Facility: CLINIC | Age: 24
End: 2019-05-29

## 2019-05-29 ASSESSMENT — ANXIETY QUESTIONNAIRES: GAD7 TOTAL SCORE: 10

## 2019-05-30 ENCOUNTER — HOME INFUSION (PRE-WILLOW HOME INFUSION) (OUTPATIENT)
Dept: PHARMACY | Facility: CLINIC | Age: 24
End: 2019-05-30

## 2019-05-30 NOTE — TELEPHONE ENCOUNTER
[employer] message sent to pt to see how she is tolerating the levofloxacin.  Cara Phelps RN

## 2019-05-30 NOTE — PROGRESS NOTES
This is a recent snapshot of the patient's [facility] medical record.  For current drug dose and complete information and questions, call [phone] or In Basket pool, fv home infusion ([id number])  CSN Number:  [id number]

## 2019-06-01 ENCOUNTER — APPOINTMENT (OUTPATIENT)
Dept: LAB | Facility: CLINIC | Age: 24
End: 2019-06-01
Attending: INTERNAL MEDICINE
Payer: COMMERCIAL

## 2019-06-03 ENCOUNTER — PATIENT OUTREACH (OUTPATIENT)
Dept: CARE COORDINATION | Facility: CLINIC | Age: 24
End: 2019-06-03

## 2019-06-03 ENCOUNTER — TELEPHONE (OUTPATIENT)
Dept: CARE COORDINATION | Facility: CLINIC | Age: 24
End: 2019-06-03

## 2019-06-03 DIAGNOSIS — B37.31 YEAST INFECTION OF THE VAGINA: Primary | ICD-10-CM

## 2019-06-03 DIAGNOSIS — R07.89 ATYPICAL CHEST PAIN: ICD-10-CM

## 2019-06-03 DIAGNOSIS — T81.49XA SURGICAL SITE INFECTION: Primary | ICD-10-CM

## 2019-06-03 RX ORDER — FLUCONAZOLE 150 MG/1
150 TABLET ORAL DAILY
Qty: 3 TABLET | Refills: 0 | Status: SHIPPED | OUTPATIENT
Start: 2019-06-03 | End: 2019-06-25

## 2019-06-03 NOTE — TELEPHONE ENCOUNTER
"Pro-active outreach call made to patient. She notes she was taken off of IV antibiotics and changed to oral levaquin due to \"passing out\". Has about 20 days of therapy left. Has symptoms of vaginal itching and white creamy discharge. Certain it is yeast and is asking for PCP to write prescription for treatment. msg sent to provider. Can send a [employer] message with response.     Teresa Cam RN  [facility]-Care Coordination  [facility]  [phone]    "

## 2019-06-03 NOTE — PROGRESS NOTES
This is a recent snapshot of the patient's [facility] medical record.  For current drug dose and complete information and questions, call [phone] or In Basket pool, fv home infusion ([id number])  CSN Number:  [id number]

## 2019-06-03 NOTE — PROGRESS NOTES
"Clinic Care Coordination Contact    Clinic Care Coordination Contact  OUTREACH    Referral Information:  Referral Source: Pro-Active Outreach         Chief Complaint   Patient presents with     Clinic Care Coordination - Follow-up     RN        Universal Utilization:      Utilization    Last refreshed: 5/30/2019 12:53 PM:  Hospital Admissions 3           Last refreshed: 5/30/2019 12:53 PM:  ED Visits 6           Last refreshed: 5/30/2019 12:53 PM:  No Show Count (past year) 11              Current as of: 5/30/2019 12:53 PM              Clinical Concerns:  Current Medical Concerns:  Was switched from IV to oral levaquin last week. Kept \"passing out\". Takes antibiotic with food and tolerates well. PICC line has been removed. Thinks she has vaginal yeast infection  Current Behavioral Concerns: none    Education Provided to patient: reinforced need to take antibiotic with food     Health Maintenance Reviewed:    Clinical Pathway: None    Medication Management:  As above     Functional Status:  Bed or wheelchair confined:: No    Living Situation:  Current living arrangement:: I live in a private home  Type of residence:: Private home - stairs    Diet/Exercise/Sleep:       Transportation:           Psychosocial:  Sabianist or spiritual beliefs that impact treatment:: No  Mental health DX:: Yes  Mental health DX how managed:: Medication, Psychiatrist  Mental health management concern (GOAL):: No  Informal Support system:: Significant other     Financial/Insurance:      No concerns     Resources and Interventions:  Current Resources:    ;         Equipment Currently Used at Home: crutches    Advance Care Plan/Directive  Advanced Care Plans/Directives on file:: No    Referrals Placed: Disability Linkage line(sent information to patient )     Goals:   Goals        General    #1 Financial Wellbeing (pt-stated)     Notes - Note created  3/28/2019  2:58 PM by Maeve Purcell BSW    Goal Statement: I need help applying for " disability    Measure of Success: Pt will apply for disability   Supportive Steps to Achieve: care coordination   Barriers: unable to navigate on own  Strengths: seeking help  Date to Achieve By: 5/1/19   Patient expressed understanding of goal: yes                  Patient/Caregiver understanding: good    Outreach Frequency: monthly  Future Appointments              Tomorrow Andres Johnson, [facility] [place]    In 1 week Ernestina Patel [facility] [place]    In 3 weeks Ernestina Patel [facility] [place]    In 1 month Austen Marquez [facility] [place]    In 1 month Ernestina Patel [facility] [place]    In 1 month Ernestina Patel [facility] [place]    In 1 month Alejandrina Hernandez MD [facility] Physical Medicine and Rehabilitation, [facility]    In 4 months Joseph De La Torre MD [facility] Multiple Sclerosis, [facility]          Plan: will send message to provider to ask for treatment of vaginal yeast infection per patient's request. Follow up approximately one month.     Teresa Cam RN  [facility]-Care Coordination  [facility]  [phone]

## 2019-06-04 ENCOUNTER — OFFICE VISIT (OUTPATIENT)
Dept: PODIATRY | Facility: CLINIC | Age: 24
End: 2019-06-04
Payer: COMMERCIAL

## 2019-06-04 VITALS — WEIGHT: 153 LBS | HEIGHT: 63 IN | RESPIRATION RATE: 16 BRPM | BODY MASS INDEX: 27.11 KG/M2

## 2019-06-04 DIAGNOSIS — Z98.890 S/P ANKLE LIGAMENT REPAIR: ICD-10-CM

## 2019-06-04 DIAGNOSIS — Z98.890 S/P FOOT SURGERY, LEFT: Primary | ICD-10-CM

## 2019-06-04 PROCEDURE — 99213 OFFICE O/P EST LOW 20 MIN: CPT | Performed by: PODIATRIST

## 2019-06-04 ASSESSMENT — MIFFLIN-ST. JEOR: SCORE: 1413.13

## 2019-06-04 NOTE — PROGRESS NOTES
"Foot & Ankle Surgery  June 4, 2019    S:  Patient in today approx 4 weeks sp washout/debridement of left ankle wound.  Pain levels present but overall manageable.  She has 2 1/2 weeks of the PO abx course remaining.  She is wondering if ambulating in an ankle brace would be ok    Resp 16   Ht 1.6 m (5' 3\")   Wt 69.4 kg (153 lb)   BMI 27.10 kg/m        ROS - positive for CC.  Patient denies current nausea, vomiting, chills, fevers, belly pain, calf pain, chest pain or SOB.  Complete remainder of ROS is otherwise neg.    PE - incision fully healed.  Minimal edema, no drainage/SOI.  Skin shows no trophic, color or temperature changes otherwise.  No calf redness, swelling or pain noted otherwise.    A/P - 23 year old yo patient approx 4 weeks sp above procedure  -sutures removed; incision healed, s-s not needed  -tensogrip dispensed for edema control  -Trilok ankle brace; ambulate in least-invasive DME that provides sufficient stability/pain control, brace vs boot  -finish PO abx course.  Follow up in 6 weeks.  That will give us 3 1/2 weeks off the PO abx course.  If no concerning findings or deterioration is noted, will consider proceeding with arthrobrostrum repair of ATFL, as this is unstable.  If any concerning findings, will repeat lab draw and imaging.  Consider MRI vs bone scan    Follow up  -  6 weeks or sooner with acute issues      Body mass index is 27.1 kg/m .  Weight management plan: Patient was referred to their PCP to discuss a diet and exercise plan.      Andres Johnson DPM FACFAS FACFAOM  Podiatric Foot & Ankle Surgeon  [facility]  [phone]    "

## 2019-06-05 ENCOUNTER — MEDICAL CORRESPONDENCE (OUTPATIENT)
Dept: HEALTH INFORMATION MANAGEMENT | Facility: CLINIC | Age: 24
End: 2019-06-05

## 2019-06-06 ENCOUNTER — TELEPHONE (OUTPATIENT)
Dept: GASTROENTEROLOGY | Facility: CLINIC | Age: 24
End: 2019-06-06

## 2019-06-06 NOTE — TELEPHONE ENCOUNTER
Called and spoke with Bam to confirm fax and referral received 5/21/19, patient called 5/21/19, contacted 5/28/19 and patient declined testing; will be contacted again to schedule testing and clarify importance of.

## 2019-06-10 NOTE — PROGRESS NOTES
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 Lilliana Miramontes- complicated migraine  # On Sprintec for birth control   # Borderline transaminase elevation    12/18 Basic blood cell counts, liver and kidney function labs within normal limits    Meets ISG 1990 criteria for Behcets. Initially, very good response to colchicine which has reduced the intensity and frequency of the oral ulcers in the past. Patient relapsed with genital ulcers and few oral ulcers in the end of 2016 and also with folliculitis in skin. Seen Derm Dr. Elliott. He recommended otezla. Otezla is working for her and she takes twice daily 30mg PO daily. Chest pain , mouth sores, hand pain, morning pain are all better when she tried otezla 30mg twice daily. Currently off of otezla since about 8/18 because of frequent UTI per patient. Colchicine dose was reduced recently in hospital to 0.3mg daily per patient. Patient reports this was regarding antibiotic interaction. As patient's behcet's is flaring up I recommend increasing colchicine back to 0.6mg BID. No interaction with levaquin. If genital lesions do not improve, then see gynecology.     Neurology investigated for seizures and cause. Hospitalized 12/17. 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     Has tried prednisone in the past, but  does not tolerate well due to mood issues, and has been off prednisone for the past 6+ months. Has H/o Tourettes. Followed by Dr. Miramontes.     She continues to have GI symptoms  Colonoscopy and endoscopy negative 2018.  Has gastroparesis.  Behcets may have GI involvement but no evidence of GI inflammation at this time. Dr. Baker has evaluated her. Dr. Rollins did the endoscopies. Her abdominal pain is not related to otezla/colchicine as her abdominal symptoms were present even before they were started. This was verified with the patient.  Patient seeing [facility] currently. [facility] suggested 6 week therapy in [place] for pelvic floor muscles. Patient is not able to afford that and thinking of alternative options.     She gets visual symptoms  But there is no evidence of uveitis including posterior uveitis or retinal vasculitis, denies symptoms at today's visit. She has seen Dr. Garcia in the past and also seen Dr. Heaton around 2/16 and 9/17. Patient still getting double vision and floaters but 9/17 eye exam was stable.     She also likely has fibromyalgia. Currently managing her behcets.     For chest symptoms, she was referred to pulmonology by GI. CT chest negative for any lung issues 1/18    - Continue oral gel/Triamcinolone acetonide cream (0.1 percent in [employer]) three to four times daily during attacks of oral ulcers and be used until pain from the ulcer ceases. Potent topical corticosteroids may be used for genital ulcers. Also use magic mouthwash as needed.    No follow-ups on file.    No orders of the defined types were placed in this encounter.      There are no discontinued medications.  Current Outpatient Medications   Medication Sig Dispense Refill     albuterol (PROAIR HFA/PROVENTIL HFA/VENTOLIN HFA) 108 (90 BASE) MCG/ACT Inhaler Inhale 2 puffs into the lungs every 6 hours as needed for shortness of breath / dyspnea or wheezing 1 Inhaler 1     amitriptyline (ELAVIL) 25 MG tablet Take 1 tablet (25  mg) by mouth At Bedtime 30 tablet 1     artificial tears OINT ophthalmic ointment 0.5 inch strip each eye at night 1 Tube 11     aspirin (ASPIRIN CHILDRENS) 81 MG chewable tablet Take 81 mg by mouth as needed        benzocaine (TOPICALE XTRA) 20 % GEL Apply as needed locally to mouth or nasal ulcers for pain; 4 times daily as needed 30 g 1     betamethasone valerate (VALISONE) 0.1 % cream Apply topically 2 times daily as needed        bisacodyl (DULCOLAX) 5 MG EC tablet Take 2 tablets (10 mg) by mouth daily as needed for constipation 30 tablet 0     citalopram (CELEXA) 10 MG tablet Take 1 tablet (10 mg) by mouth daily 30 tablet 1     clobetasol (TEMOVATE) 0.05 % cream Apply topically 2 times daily 60 g 0     colchicine (COLCYRS) 0.6 MG tablet Take 0.6 mg by mouth 2 times daily       cyproheptadine (PERIACTIN) 4 MG tablet Take 1 tablet (4 mg) by mouth At Bedtime For nightmares 30 tablet 2     dicyclomine (BENTYL) 20 MG tablet Take 1 tablet (20 mg) by mouth 4 times daily as needed 120 tablet 3     diphenhydrAMINE (BENADRYL) 25 MG tablet Take 1 tablet (25 mg) by mouth 3 times daily as needed (itching) 40 tablet 1     diphenhydrAMINE (BENADRYL) 50 MG capsule Take 1 capsule (50 mg) by mouth every 6 hours as needed for itching or allergies 100 capsule 0     EPINEPHrine (EPIPEN 2-EAMON) 0.3 MG/0.3ML injection Inject 0.3 mLs (0.3 mg) into the muscle as needed for anaphylaxis 0.6 mL 3     ertapenem (INVANZ) 1 GM injection Inject 1 g into the vein every 24 hours ESR,CRP,CBC with differential, creatinine, SGOT weekly while on this medication to be faxed to Dr. Escoto office. 400 mL 0     gabapentin (NEURONTIN) 100 MG capsule Take 1 capsule (100 mg) by mouth 3 times daily as needed (anxiety) 90 capsule 1     guanFACINE (TENEX) 1 MG tablet Take 3 tablets (3 mg) by mouth twice daily in the morning and evening. 180 tablet 0     hydrocortisone 1 % CREA cream Place rectally 2 times daily as needed for itching 28.35 g 1     hyoscyamine  (ANASPAZ/LEVSIN) 0.125 MG tablet Take 1-2 tablets (125-250 mcg) by mouth every 4 hours as needed for cramping 40 tablet 1     hypromellose (GENTEAL) 0.3 % SOLN 1 drop every hour as needed for dry eyes        lactulose 20 GM/30ML SOLN Take 30 mLs by mouth 3 times daily as needed (for constipation) 300 mL 3     levofloxacin (LEVAQUIN) 750 MG tablet Take 1 tablet (750 mg) by mouth daily for 28 days 28 tablet 0     lidocaine (LMX4) 4 % external cream Apply topically once as needed for mild pain       linaclotide (LINZESS) 290 MCG capsule Take 1 capsule (290 mcg) by mouth every morning (before breakfast) 90 capsule 3     LORazepam (ATIVAN) 0.5 MG tablet Take 1 tablet (0.5 mg) by mouth every 6 hours as needed for anxiety 10 tablet 0     Magic Mouthwash (FV std formula) lidocaine visc 2% 2.5mL/5mL & maalox/mylanta w/ simeth 2.5mL/5mL & diphenhydrAMINE 5mg/5mL Swish and swallow 10 mLs in mouth every 6 hours as needed for mouth sores 1 Bottle 1     omeprazole (PRILOSEC) 40 MG capsule Take 1 capsule (40 mg) by mouth daily 90 capsule 3     ondansetron (ZOFRAN-ODT) 8 MG ODT tab Take 1 tablet (8 mg) by mouth every 8 hours as needed for nausea 90 tablet 1     order for DME Equipment being ordered: Trilok brace 8 1/2 left lower extremity 1 Device 0     order for DME Equipment being ordered: Other: Knee scooter  Treatment Diagnosis: infected left ankle joint 1 each 0     order for DME Roll-A-Bout Walker. Patient can use for 3 months 1 Units 0     order for DME Equipment being ordered: size 8 1/2 walking boot tall 1 Device 0     order for DME Equipment being ordered: RollAbout knee scooter 1 Device 0     order for DME Equipment being ordered: adult pair of crutches 1 Device 0     oxyCODONE (ROXICODONE) 5 MG tablet Take 1-2 tablets (5-10 mg) by mouth every 6 hours as needed for moderate to severe pain 12 tablet 0     oxyCODONE-acetaminophen (PERCOCET) 5-325 MG tablet Take 1 tablet every 6 hours as needed for pain 12 tablet 0      polyethylene glycol (MIRALAX/GLYCOLAX) powder Take 1 capful by mouth 3 times daily       sennosides (SENOKOT) 8.6 MG tablet Take 3 tablets by mouth 2 times daily 240 tablet 3     sodium chloride 0.9% SOLN BOLUS Inject 1,000 mLs into the vein daily as needed (IV abx and flushes) 1000 mL 11     SPRINTEC 28 0.25-35 MG-MCG tablet Take one tablet by mouth daily. Take continuously. 84 tablet 1     sucralfate (CARAFATE) 1 GM/10ML suspension Take 10 mLs (1 g) by mouth 4 times daily 1200 mL 2     Austen Marquez MD.           Active Problem List:     Patient Active Problem List    Diagnosis Date Noted     Infection of joint of ankle (H) 05/06/2019     Priority: Medium     Acute abdominal pain 03/30/2019     Priority: Medium     Cellulitis 03/09/2019     Priority: Medium     Aphthous ulcer 11/13/2018     Priority: Medium     Overview:   Created by Conversion       Cough 11/13/2018     Priority: Medium     Overview:   Created by Conversion       Dysthymic disorder 11/13/2018     Priority: Medium     Overview:   Created by Conversion       Displacement of lumbar intervertebral disc without myelopathy 11/13/2018     Priority: Medium     Overview:   Created by Conversion       Iron deficiency associated with nonfamilial restless legs syndrome 11/13/2018     Priority: Medium     Other specified symptom associated with female genital organs 11/13/2018     Priority: Medium     Overview:   Created by Conversion       Enthesopathy of hip region 11/13/2018     Priority: Medium     Overview:   Created by Conversion       Tourette's syndrome 11/13/2018     Priority: Medium     Overview:   Created by Conversion       Somatic symptom disorder 06/01/2018     Priority: Medium     Pelvic floor weakness 04/25/2018     Priority: Medium     Spells of decreased attentiveness 12/19/2017     Priority: Medium     Mobile cecum 11/09/2017     Priority: Medium     Cecum noted in Right lower quadrant on 4/17 CT scan, and in Left upper Quadrant  on CT on 11/2017.       Vitamin D deficiency 10/11/2017     Priority: Medium     How low, unknown/not found. On D when tested at 27. Starting cholecalciferol October 2017. Needs recheck.       Convulsions, unspecified convulsion type (H) 10/03/2017     Priority: Medium     Transient alteration of awareness 10/03/2017     Priority: Medium     Chronic pain syndrome 07/27/2017     Priority: Medium     Major depressive disorder, recurrent episode, moderate (H) 06/27/2017     Priority: Medium     Cervical pain 05/02/2017     Priority: Medium     Acute left ankle pain 03/31/2017     Priority: Medium     Cervical dystonia 03/28/2017     Priority: Medium     PTSD (post-traumatic stress disorder) 01/17/2017     Priority: Medium     Patellofemoral instability 10/20/2016     Priority: Medium     Fibromyalgia 08/04/2016     Priority: Medium     Rheumatoid arthritis of multiple sites without rheumatoid factor (H) 08/04/2016     Priority: Medium     Raynaud's disease without gangrene 08/04/2016     Priority: Medium     Chronic abdominal pain 08/04/2016     Priority: Medium     Palpitations 01/12/2016     Priority: Medium     On colchicine therapy 10/30/2015     Priority: Medium     Spell of shaking 05/06/2015     Priority: Medium     Migraine 02/04/2015     Priority: Medium     Problem list name updated by automated process. Provider to review       Behcet's disease (H) 12/10/2014     Priority: Medium     Headaches due to old head injury 11/12/2013     Priority: Medium     Milk protein intolerance 10/11/2013     Priority: Medium     Intestinal malabsorption 10/11/2013     Priority: Medium     Concussion 02/13/2013     Priority: Medium     Jan 2013, with prolonged recovery- followed by sports med         Knee pain 01/03/2013     Priority: Medium     Generalized anxiety disorder 06/25/2009     Priority: Medium     Overview:   Created by Conversion       Anxiety state 06/25/2009     Priority: Medium     Overview:   Created by  Conversion    Replacement Utility updated for latest IMO load       Tics - Tourette syndrome 05/18/2009     Priority: Medium     Followed by psychotherapy. Symptoms well managed. Originally diagnosed at [facility]. (Dr. Simpson)           IBS (irritable bowel syndrome) 05/18/2009     Priority: Medium     Allergic rhinitis 05/18/2009     Priority: Medium     GERD (gastroesophageal reflux disease) 01/10/2008     Priority: Medium     Gastroparesis 1995     Priority: Medium          History of Present Illness:     No chief complaint on file.    [place] [place] [facility] [place] [facility] [place]    Original presentation 2014:    Ms Oropeza is a 19 y.o. female who presents with one year of presentation of painful oral ulcers (monthly) once that have worsened with two episodes in the last two months that presented with painful vulvar or vaginal ulcers (2 episodes so far every month) [not sure if they leave scar] as well that timing coincides with menses ([facility] [place]: 8/25/14).   ORAL ULCERS: last two episodes were severe, painful, white base with erythematous halo, that appear and disappear in the matter of 1-2 weeks without, does not bleed and doesn't respond to any treatment used (magic mouthwash), 10-15 in number with the biggest one being dime size.     VAGINAL ULCERS: 2-3 in number between labia majora and minora that occur simultaneously with oral ulcers, painful, non bleeding ulcers that are erythematous, no pus.     FOLLICULITIS: patient reports having spots in legs specially around ankle and knee, but arms, and neck are affected as well. Small lesions that resemble ingrown hair, most are red erythematous elevated lesions, well demarcated, some with liquid that patient refers as pimple like.     SKIN RASHES: welts and red macules with well defined borders that are pruritic and non-ulcerative on chest, arms and back. Benadryl relieves.     ARTHRALGIAS: In descending order of severity: hips, knees, wrists, shoulders,  neck, lumbar, fingers.   C/o morning stiffness in hips, back and neck lasting for about until noon.     Ms. Oropeza reports they present in severe flares and never fully resolve, but do get better. She has seen some swelling and warmth on the affected joints but has not noticed and redness. Has tried Tylenol, ibuprofen, and hydrocodone with not much benefit.     FEVERS: Reports 3-4 sporadic episodes per month of self limited fevers of 38 C that occur during the evenings and associate facial flushing.     HEADACHES: occur daily and are bitemporal and irradiate occipitally, pulsatile in nature, intensity varies from intense to mild, are worsened with movement. On treatment with ibuprofen and somatriptan without any positive results.     VISION CHANGES: Patient reports that suddenly she would only see a gray blotch in her right eyes and would persist like this for a day and a half. Also reports blurriness in her left eye. Presence of pain and burning sensation of eyeball with associated redness. Has changed prescription glasses in the matter of 2 years 3 times. She has had opthalmology exam and was not suggestive of any evidence of uveitis.   She was also evaluated in ED for a dizzy spell.     ABD PAIN W/ INTERMITTENT DIARRHEA AND CONSTIPATION: Patient reports abdominal pain that is intermittent, periods of 7 days without bowel movement and feeling bloated with change of pattern to diarrhea (liquidy without blood nor mucus, non foul smelling). Has taken Bentyl, Zegrid, and rinetidine with mild clinical improvement.  She also reports small red nodules after each needle stick for blood draw.   Neurology saw her back then and was not impressed with her presentation as physical examination was normal. Also MRI brain normal: Findings: No definite hemorrhage, mass affect, midline shift, or ventriculomegaly is noted.There are a few scattered non specific white matter T2 hyperintensities. Axial diffusion weighted images are  unremarkable.     The major vascular structures appear patent. There is opacification and severe mucosal thickening in the right maxillary sinus. The remaining paranasal sinuses, and mastoid air cells are clear. Orbits are unremarkable  She has + HSV-1 IGG. Seen ID. Negative for Herpes simplex virus culture. HSV IGM I/II COMBINATION SENT TO [facility]  RESULTS = <0.91  REF RANGE: <0.91 = NEGATIVE  ID did not think HSV could explain her mouth and genital sores.     CRP within normal limits. HIV negative. CBC within normal limits; UA negative. Creatinine within normal limits   CYNDIE neg.  Antigliadin neg.   ANti TTG neg.   [facility] 2014: Colonoscopy and endoscopy neg; result not available. Not sure if biopsies were done.   Raynaud's phenomenon:  Onset: about 2013  Digits: all fingers  Digital ulcers: no  Color changes: white ---> purple and red  Duration of an attack: About couple of hours per mom  Pain during attack: not clear pain but bruise like feeling  Frequency of attacks: few times a month  Family history of Raynauds: no  GERD: very long time    No hemoptysis.   No miscarriages/ no thrombosis in past.   No family or personal history of psoriasis, ulcerative colitis or chron's disease.   No buttock pain or low back pain or stiffness in AM    Interim history:  Dec 2014- Multiple mouth ulcers and did not eat for 5 days. This coincided with periods. Colchicine 0.6mg PO BID started in Nov 2014.   Prednisone taper in Dec 20mg to 0 in 4 weeks. She also used magic mouthwash. Kenalog cream. After going to the ER, 3 days later her ulcers were better. She thinks it improved after the menstruation stopped.   LMP 3 weeks ago.   COLCHICINE HAS HELPED A LOT REDUCING THE INTENSITY OF MENSTRUATION ASSOCIATED ORAL AND VULVAR ULCERS.     Interim history: 6/15    Since last visit only two episodes of mouth sores- small sized 6   No genital ulcers since last visit.   Colchicine 1.2 mg in AM and 0.6mg in PM keeps her symptoms under control.  "    Admitted in 5/15:  Admission Diagnoses:  - LUE weakness  - spell  - hx of migraines  - hx of Tourette syndrome  - hx of Behcet's disease    Discharge Diagnoses:   - spell likely 2/2 anxiety  - hx of migraines  - hx of Tourette syndrome  - hx of Behcet's disease    CT and MRI brain was normal.     Seeing Dr. Lilliana Miramontes soon as outpatient.     Dr. Gacria did not find any intraocular inflammation.      Interm 10/30/2015  ED for migraine. Continues to see neuro - MRI brain without lesions noted. Saw GI - upper barium normal. May be considering repeat colo. Worsening lesions in mouth and genitals. Has had continuous lesion in mouth x 2 months. 2 genital ulcers. Had fracture of right sesamoid in foot. Continues to have low grade fevers. New folliculitis on chest, legs and arms.     Interim hx: 1/11/2016    Pt states that since last visit she continues to have oral ulcers and has noted more folliculitis-like lesions on her lower extremities.  She states that on her right lower leg she had a red macular spot that has since resolved.  She denies uveitis.  She states that the colchicine initially helped but then stopped working.  She takes 0.6 mg TID.  She stopped taking her prednisone last week as she feels like this hasn't helped.  She hasn't had any episodes of vaginal ulcers.      She saw GI who stated she has gastroparesis.  She is seeing Cardiology later this week for possible syncopal episodes.      Interim history 5/16  Mouth ulcers X 1 last month  Genital ulcers - none since last visit.     Bilateral MCPs pain, knee pain and low back pain +ve increased since last visit  Morning stiffness X 2 hours (increasing)   5-6/10    \"overall myalgia\" has gotten worse    C/o pseudofolliculitis lesion on anterior shins bilaterally worse    Interim history: (7/18/2016)  Patient has just started Humira for 2 doses on 7/1 and 7/15 and reported minimal improvement of her hip pain but otherwise, did not notice anything " different.     She reported painful mouth ulcer once a month since last visit but noticed that it has been getting deeper.   Denied any genital ulcers.    Still has ongoing bilateral MCPs pain, knee pain but this has been intermittent and she did not have any much pain today. She reported 2-3 hours morning stiffness of both hands and pain score of 6/10 at her knees and 8/10 of her hands but currently takes no pain medication except prn ativan which she takes when her muscle is really tight.     The only concern is that she gained weight even though she has not been eating much (eat once a day) and do exercises every day for 1 hour (with video of yoga, pilates). Her mother wonders if she needs to have some labs work up include sex hormone, thyroid, cortisol.    Interval history 9/14/16  Patient was started Humira at the last visit, patient reports worsening of skin ulcers since starting Humira and stopped taking Humura for the past 2 weeks. Patient reports oral and genital ulcers has been stable even before starting Humira and has not had any interval oral/genital ulcers. However she reports recurrent skin ulcers occurring on a daily basis that affects her chest and anterior legs. Patient also has stopped taking prednisone, she reports that she doesn't feel the prednisone helps, her last dose of prednisone was 6+ months ago. Patient also report worsening low back pain that sometimes causes her to limp.    In the interval patient also visited ED on 8/31/16 for left hand pain and what the patient describes as phlebitis, no medication or procedure was done and the patient was discharged with a splint. Patient also reported 1 episode of chest pressure that was associated with dyspnea and numbness of the left arm, she was shopping in a supermarket at the time of onset, and the pressure resolved after she took a nap.    Patient denies any infectious symptoms in the interval, no fever, chills, night sweat, cough, dysuria,  denies eye pain or visual changes.    November 4, 2016  Oct - had one genital ulcer  Oral ulcers X 5- around menstruation time.   Knee pain- chronic on the left side  Dizziness spells - on and off; been to the ER for that in the past.   On and off migraines  July 2013 - s/p MPFL reconstruction plus LRL 7/2013  Morning stiffness in knee (left) X 1 hour    Severe jaw pain and chest pain- patient wondering if this is Tourette's or esophageal spasms. Cardiac workup negative.   Fever     January 13, 2017  Yesterday in ER [facility] [place] with orthostatic syncope from dehydration.   Chest pain on and off still continues. Painful with deep breaths.   Oral ulcers - few minor ones lasting for one week;   One major one in roof of mouth lasting for about one week.   Knee pain +ve - reviewed the recent MRI with her orthopedic surgeon.   On and off migraines  otezla is approved. Still waiting to receive the meds.     March 31, 2017  Otezla current dose 15mg PO BID.   She is tolerating okay at this dose and is willing to increase the dose further up to 30mg BID.   Her joint pains are better on otezla. Knee pain is also better.   Back and neck pain +ve 8/10 when it is worse. Intramuscular botox injections were given on Monday in PMR.   2 minor genital ulcers in the interim- resolved.   Few oral ulcers in the interim- resolved.   Nasal ulcer - resolved.   Migraines on and off.   Nausea with otezla but improving.   Dizziness and blackouts on and off. She fell once and hurt her ankle. Wearing boot in left leg.   Complete ROS negative except for above    May 17, 2017  Tolerating otezla better. Not throwing up anymore. Current dose 20mg in AM and 30mg in PM (2nd week).   Patient thinks that her oral ulcers frequency and severity are improving with otezla. Also no genital ulcers in the interim.   Currently on doxycycline for bronchitis/?pneunomia. 4 more days left.     Joint pain history  2 weeks ago/ dx with pneumonia 1 week ago/  Involved joints:  all over  Pain scale: 6.5/10   Wakes the patient from sleep : Yes  Morning stiffness: Yes for 120 minutes  Meds used:otezla,colchicine     Interim history  Since last visit:  1. Infections - Yes/ pneumonia  2. New symptoms/medical problem - Yes/ thinks she may have had a mini-seizure about 10 days ago ; did not seek medical attention; did not reoccur after that. Patient seeing PCP today.  3. Any side effects from Rheum medications -vomiting a lot (resolved)  3. ER visits/Hospitalizations/surgeries - Yes  4. Last PCP visit: has an apt. today    Patient still getting double vision. Floaters present.     Therapist recommended psychiatry evaluation. Patient does not want to see psychaitry. Patient will see PCP today.     August 22, 2017  Have you ever seen a rheumatologist Yes Who You When 5/17/17    NO genital ulcers in the interim.   Mouth ulcers- three in the back of the throat and roof of the mouth last month.     Joint pain history  Onset: doing alittle worse / cut her otezla back to 30mg  Daily due to GI problems  Involved joints: all over her body. Been having more black out episodes, been having more chest pains.also has raised bumps on both legs from the knees down that itch and are painful   Pain scale:  5.5/10     Wakes the patient from sleep : Yes  Morning stiffness:Yes for 120 minutes  Meds used:otezla, colchicine (three pills daily)     Interim history  Since last visit:  1. Infections - Yes stomach issue  2. New symptoms/medical problem - No  3. Any side effects from Rheum medications -nausea from otezla  3. ER visits/Hospitalizations/surgeries - Yes, ER for foot, ankle injury from passing out and fell down the steps. Hit her head another time from passing out. Alcohol poisoning in July 4. Last PCP visit: 6/23/17 September 19, 2017  Have you ever seen a rheumatologist Yes Who You When 8/22/17  Joint pain history  Onset: pt states that she hurts everywhere for 6 days  Involved joints: all joints  Pain  scale:  7/10     Wakes the patient from sleep : Yes/ not sleeping at all  Morning stiffness:Yes for 180 minutes  Meds used:colchicine, otezla, magic mouth wash, lidocaine gel  Last one week: increased joint pain; and genital ulcer  Genital lesion (dimed size) in the last one week  After botox a week ago, she had fever 101 for three days; near syncopes. Back pain; floaters  Chest pain , mouth sores, hand pain, morning pain were all better with otezla 30mg twice daily.    Interim history  Since last visit:  1. Infections - No  2. New symptoms/medical problem - No  3. Any side effects from Rheum medications -nausea from the otezla  3. ER visits/Hospitalizations/surgeries - No  4. Last PCP visit: may 2017     October 18, 2017     Have you ever seen a rheumatologist Yes Who You When 9/19/17  Joint pain history  NO mouth or genital sores in the last 4 weeks.   Medrol dosepak helped with visual symptoms but not joint pains.     Onset: pt states that she is doing better than last time with her behcet's. Today has severe stomach pains, states that she is constipated. Pt had a seizure 2 days ago. Does have a metallic taste in her mouth  Involved joints: hands , neck, shoulders  Pain scale:  9/10 from stomach pain;      Wakes the patient from sleep : Yes  Morning stiffness:Yes for 120 minutes  Meds used:cochicine , otezla 30mg twice daily     Interim history  Since last visit:  1. Infections - No  2. New symptoms/medical problem - Yes/ the cartilage in her chest is inflamed  3. Any side effects from Rheum medications -nausea from the otezla  3. ER visits/Hospitalizations/surgeries - No  4. Last PCP visit: yesterday    January 16, 2018  Have you ever seen a rheumatologist Yes Who You When 10/18/17  Joint pain history  Onset: pt states that she was in the hospital for 5 days before maribell, they told her she had lesions in her brain in the white matter. Had blood work drawn in the ER and they told her her inflammatory markers  were elevated. Was seen in the ER last week for vomiting and diarrhea, not eating. Saw Gastro. On 1/10/18. 1.5 weeks ago she had an endoscopy and colonoscopy. She has been having elbow  And hands and knee pain, loosing use of her hands. Been dropping things. Also states that she has been getting lesions up her nose  Involved joints: see above  Pain scale:  8/10     Wakes the patient from sleep : Yes  Morning stiffness:Yes for 120 minutes  Meds used:colchisine, otezla, magic mouth wash, kenolog cream, lidocaine gel     Interim history  Since last visit:  1. Infections - Yes/ had an infection in her jaw. Having sinus issues  2. New symptoms/medical problem - Yes/ states that she is having problems walking, states that she was bouncing when she was walking  3. Any side effects from Rheum medications -otezla, nausea  3. ER visits/Hospitalizations/surgeries - Yes/ see chart  4. Last PCP visit: November 2017 April 17, 2018  Have you ever seen a rheumatologist Yes Who You When 1/16/18  Joint pain history  Onset: pt states that she is not doing well. She is having increased stomach issues, only eats 2 times in 4 days unable to keep the otezla down due to vomiting . Has sleep study done in 1 week; no genital ulcers since last appointment. 6 small mouth sores since last appointment.   Involved joints: see above   Pain scale:  7/10     Wakes the patient from sleep : Yes  Morning stiffness:Yes for 60 minutes  Meds used:otezla , colchicine, diclofenac gel, magic mouthwash, lidocaine gel      Interim history  Since last visit:  1. Infections - Yes/ had a sinus infection, thinks she is getting sick now  2. New symptoms/medical problem - Yes/ neurology sent pt to cardiology. Has a heart condition but not sure what . She is seeing a ortho. Due to knee pain   3. Any side effects from Rheum medications -nausea/vomiting from the otezla   3. ER visits/Hospitalizations/surgeries - Yes/ seen in ER   4. Last PCP visit: yesterday    July  17, 2018  Have you ever seen a rheumatologist Yes Who You When 4/17/18  Joint pain history  Onset: pt Is here for a follow-up states that she started to get lesions on her legs , face and arm. Hurts all over, lower back is very painful. Right hand and wrist area are numb  Involved joints: see above  Pain scale:  7/10   worse in the morning  Wakes the patient from sleep : Yes/ does not go to sleep till late  Morning stiffness:Yes for 4-5 hours minutes  Meds used:colchicine, otezla has  Not started otezla yet due to extreme nausea      Interim history  Since last visit:  1. Infections - Yes/ had a bacterial infection in her pelvic area was hospitalized  2. New symptoms/medical problem - Yes/ hands are swelling up. Having orthopedic issues. Was having problem with her veins sticking up, and very painful. Has happened multiply times   3. Any side effects from Rheum medications -see above  3. ER visits/Hospitalizations/surgeries - Yes/ hospital and ER for bacterial infection  4. 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 ER said may have blood clot in calf, but when did MRI, stated body may have broken it up on it's own. Elevated D-dimer  Involved joints: Knees, neck, hands  Pain scale:  6.5/10     Wakes the patient from sleep : Yes  Morning stiffness:Yes for 180 minutes  Meds used:hyoscyamine, not taking otezla. Last dose Sep. Patient did not want to take otezla with the antibiotics.     Last major mouth ulcer- aug or Sep  No genital ulcers in the last 6 months.      Interim history  Since last visit:  1. Infections - Yes, UTI's twice a month since last visit  2. New symptoms/medical problem - No  3. Any side effects from Rheum medications -otzela causes nausea  3. ER visits/Hospitalizations/surgeries - Yes, 1/2/19 repaired some ligaments and mass taken out,  also joint build up removed from a previous injury  4. Last PCP visit: 12/5/19 June 12, 2019  Have you ever seen a rheumatologist yes Who you When 1/9/19  Joint pain history  Onset: Patient is here for a follow up. A lot of infections and in and out of the hospital, who wanted her to follow up with Rheumatology again.  Involved joints: knees, legs, back, neck, shoulders, ankles  Pain scale:  6.5/10     Wakes the patient from sleep : Yes  Morning stiffness:Yes for 120 minutes  Meds used:magic mouthwash, colchicine     Interim history  Since last visit:  1. Infections - Yes, staph  2. New symptoms/medical problem - kidney failure  3. Any side effects from Rheum medications -none  3. ER visits/Hospitalizations/surgeries - Yes, 3 hospitalizations, and surgeries,  4. Last PCP visit: 6/11/19         Past Medical History:     Past Medical History:   Diagnosis Date     Anemia      Anxiety      Arthritis      Behcet's disease (H)      Cervical adenitis May 2010     Chronic abdominal pain      Constipation, chronic 1994     Fibromyalgia      Gastro-oesophageal reflux disease      Gastroparesis      Irregular heart beat     tachycardia, has had workup     Migraines      Neuromuscular disorder (H)     fibramyalgia     Palpitations      Seizure (H)      Seizures (H)     unknown etiology     Syncope      Tourette's      Past Surgical History:   Procedure Laterality Date     ARTHROSCOPY ANKLE, REPAIR LIGAMENT Left 1/2/2019    Procedure: Ankle arthroscopy and sinus tarsi evacuation, ligament repair, left lower extremity;  Surgeon: Andres Johnson DPM;  Location:  OR     ARTHROSCOPY KNEE WITH PATELLAR REALIGNMENT  7/25/2013    Procedure: ARTHROSCOPY KNEE WITH PATELLAR REALIGNMENT;  Left Knee Arthroscopy, Medial Patellofemoral Ligament Reconstruction with Allograft  ;  Surgeon: Jennifer Acevedo MD;  Location: [facility]     COLONOSCOPY  2015     DENTAL SURGERY  1996    Teeth removal     ENDOSCOPY UPPER, COLONOSCOPY, COMBINED   2005     HC ESOPH/GAS REFLUX TEST W NASAL IMPED >1 HR N/A 2/15/2017    Procedure: ESOPHAGEAL IMPEDENCE FUNCTION TEST WITH 24 HOUR PH GREATER THAN 1 HOUR;  Surgeon: Timothy Matta MD;  Location: [facility]     IR PICC PLACEMENT > 5 YRS OF AGE  3/13/2019     IRRIGATION AND DEBRIDEMENT FOOT, COMBINED Left 3/12/2019    Procedure: COMBINED IRRIGATION AND DEBRIDEMENT LEFT ANKLE;  Surgeon: Micha Glover MD;  Location: [facility]     IRRIGATION AND DEBRIDEMENT LOWER EXTREMITY, COMBINED Left 5/7/2019    Procedure: 1.  Excision of wound down to and including deep fascia, less than 20 cm2.  2.  Irrigation and debridement, left ankle.;  Surgeon: Andres Johnson DPM;  Location: [facility]     PICC INSERTION Left 05/05/2019    4Fr - 45cm (5cm external), Basilic vein, SVC RA junction          Social History:     Social History     Occupational History     Not on file   Tobacco Use     Smoking status: Never Smoker     Smokeless tobacco: Never Used   Substance and Sexual Activity     Alcohol use: Yes     Alcohol/week: 0.6 oz     Types: 1 Standard drinks or equivalent per week     Comment: rarely     Drug use: No     Types: Marijuana     Comment: occassional marijuana only, denies others     Sexual activity: Not Currently     Partners: Male     Birth control/protection: Pill          Family History:     Family History   Problem Relation Age of Onset     Depression Mother      Neurologic Disorder Mother         Migraines, take imitrex injection.  Also in maternal grandmother.       Alcohol/Drug Father      Hypertension Father      Depression Father      Osteoarthritis Father      Cardiovascular Maternal Grandmother      Depression Maternal Grandmother      Hypertension Maternal Grandmother      Alzheimer Disease Maternal Grandmother      Cardiovascular Maternal Grandfather      Hypertension Maternal Grandfather      Depression Maternal Grandfather      Alcohol/Drug Maternal Grandfather      Cardiovascular Paternal  Grandmother      Hypertension Paternal Grandmother      Cardiovascular Paternal Grandfather      Hypertension Paternal Grandfather      Glaucoma No family hx of      Macular Degeneration No family hx of           Allergies:     Allergies   Allergen Reactions     Amoxil [Penicillins] Rash     Dad unsure of reaction.     Bee Venom Anaphylaxis     Contrast Dye Rash     Contrast Media Ready-[facility] [place], 04/09/2014.; Contrast [employer] [place], 04/09/2014.  NOTE: this is a contrast media oral with iodine. Premedicate with methylpred standard for IV contrast, request barium contrast for oral contrast.     Orange Fruit [Citrus] Anaphylaxis     Pineapple Anaphylaxis, Difficulty breathing and Rash     Reglan [Metoclopramide] Other (See Comments)     IV dose only, in ER, rapid heart rate.     Ace Inhibitors      Difficulty in breathing and GI upset     Amitiza [Lubiprostone] Nausea and Vomiting     Amoxicillin-Pot Clavulanate      Midazolam Unknown     Other reaction(s): Unknown  parent states that when pt takes this medication, she wakes up being very violent .  parent states that when pt takes this medication, she wakes up being very violent .     No Clinical Screening - See Comments      Bleech/ chest tightness, itchy throat, swollen tongue, hives     Tizanidine Other (See Comments)     Confusion, back pain, photophobia, abdominal pain, shaking, anxious       Versed      Coming out of pelvic exam at age of 5, was kicking and screaming when coming out of the versed.     Adhesive Tape Rash     Azithromycin Hives and Rash     Cephalexin Itching and Rash     Itchy mouth  Itchy mouth     Keflex [Cephalexin-Fd&C Yellow #6] Hives          Medications:     Current Outpatient Medications   Medication Sig Dispense Refill     albuterol (PROAIR HFA/PROVENTIL HFA/VENTOLIN HFA) 108 (90 BASE) MCG/ACT Inhaler Inhale 2 puffs into the lungs every 6 hours as needed for shortness of breath / dyspnea or wheezing 1 Inhaler 1     amitriptyline  (ELAVIL) 25 MG tablet Take 1 tablet (25 mg) by mouth At Bedtime 30 tablet 1     artificial tears OINT ophthalmic ointment 0.5 inch strip each eye at night 1 Tube 11     aspirin (ASPIRIN CHILDRENS) 81 MG chewable tablet Take 81 mg by mouth as needed        benzocaine (TOPICALE XTRA) 20 % GEL Apply as needed locally to mouth or nasal ulcers for pain; 4 times daily as needed 30 g 1     betamethasone valerate (VALISONE) 0.1 % cream Apply topically 2 times daily as needed        bisacodyl (DULCOLAX) 5 MG EC tablet Take 2 tablets (10 mg) by mouth daily as needed for constipation 30 tablet 0     citalopram (CELEXA) 10 MG tablet Take 1 tablet (10 mg) by mouth daily 30 tablet 1     clobetasol (TEMOVATE) 0.05 % cream Apply topically 2 times daily 60 g 0     colchicine (COLCYRS) 0.6 MG tablet Take 0.6 mg by mouth 2 times daily       cyproheptadine (PERIACTIN) 4 MG tablet Take 1 tablet (4 mg) by mouth At Bedtime For nightmares 30 tablet 2     dicyclomine (BENTYL) 20 MG tablet Take 1 tablet (20 mg) by mouth 4 times daily as needed 120 tablet 3     diphenhydrAMINE (BENADRYL) 25 MG tablet Take 1 tablet (25 mg) by mouth 3 times daily as needed (itching) 40 tablet 1     diphenhydrAMINE (BENADRYL) 50 MG capsule Take 1 capsule (50 mg) by mouth every 6 hours as needed for itching or allergies 100 capsule 0     EPINEPHrine (EPIPEN 2-EAMON) 0.3 MG/0.3ML injection Inject 0.3 mLs (0.3 mg) into the muscle as needed for anaphylaxis 0.6 mL 3     ertapenem (INVANZ) 1 GM injection Inject 1 g into the vein every 24 hours ESR,CRP,CBC with differential, creatinine, SGOT weekly while on this medication to be faxed to Dr. Escoto office. 400 mL 0     gabapentin (NEURONTIN) 100 MG capsule Take 1 capsule (100 mg) by mouth 3 times daily as needed (anxiety) 90 capsule 1     guanFACINE (TENEX) 1 MG tablet Take 3 tablets (3 mg) by mouth twice daily in the morning and evening. 180 tablet 0     hydrocortisone 1 % CREA cream Place rectally 2 times daily as  needed for itching 28.35 g 1     hyoscyamine (ANASPAZ/LEVSIN) 0.125 MG tablet Take 1-2 tablets (125-250 mcg) by mouth every 4 hours as needed for cramping 40 tablet 1     hypromellose (GENTEAL) 0.3 % SOLN 1 drop every hour as needed for dry eyes        lactulose 20 GM/30ML SOLN Take 30 mLs by mouth 3 times daily as needed (for constipation) 300 mL 3     levofloxacin (LEVAQUIN) 750 MG tablet Take 1 tablet (750 mg) by mouth daily for 28 days 28 tablet 0     lidocaine (LMX4) 4 % external cream Apply topically once as needed for mild pain       linaclotide (LINZESS) 290 MCG capsule Take 1 capsule (290 mcg) by mouth every morning (before breakfast) 90 capsule 3     LORazepam (ATIVAN) 0.5 MG tablet Take 1 tablet (0.5 mg) by mouth every 6 hours as needed for anxiety 10 tablet 0     Magic Mouthwash (FV std formula) lidocaine visc 2% 2.5mL/5mL & maalox/mylanta w/ simeth 2.5mL/5mL & diphenhydrAMINE 5mg/5mL Swish and swallow 10 mLs in mouth every 6 hours as needed for mouth sores 1 Bottle 1     omeprazole (PRILOSEC) 40 MG capsule Take 1 capsule (40 mg) by mouth daily 90 capsule 3     ondansetron (ZOFRAN-ODT) 8 MG ODT tab Take 1 tablet (8 mg) by mouth every 8 hours as needed for nausea 90 tablet 1     order for DME Equipment being ordered: Trilok brace 8 1/2 left lower extremity 1 Device 0     order for DME Equipment being ordered: Other: Knee scooter  Treatment Diagnosis: infected left ankle joint 1 each 0     order for DME Roll-A-Bout Walker. Patient can use for 3 months 1 Units 0     order for DME Equipment being ordered: size 8 1/2 walking boot tall 1 Device 0     order for DME Equipment being ordered: RollAbout knee scooter 1 Device 0     order for DME Equipment being ordered: adult pair of crutches 1 Device 0     oxyCODONE (ROXICODONE) 5 MG tablet Take 1-2 tablets (5-10 mg) by mouth every 6 hours as needed for moderate to severe pain 12 tablet 0     oxyCODONE-acetaminophen (PERCOCET) 5-325 MG tablet Take 1 tablet every 6  hours as needed for pain 12 tablet 0     polyethylene glycol (MIRALAX/GLYCOLAX) powder Take 1 capful by mouth 3 times daily       sennosides (SENOKOT) 8.6 MG tablet Take 3 tablets by mouth 2 times daily 240 tablet 3     sodium chloride 0.9% SOLN BOLUS Inject 1,000 mLs into the vein daily as needed (IV abx and flushes) 1000 mL 11     SPRINTEC 28 0.25-35 MG-MCG tablet Take one tablet by mouth daily. Take continuously. 84 tablet 1     sucralfate (CARAFATE) 1 GM/10ML suspension Take 10 mLs (1 g) by mouth 4 times daily 1200 mL 2          Physical Exam:   unknown if currently breastfeeding.  Wt Readings from Last 4 Encounters:   06/04/19 69.4 kg (153 lb)   05/28/19 69.4 kg (153 lb)   05/23/19 69.4 kg (153 lb)   05/21/19 70.7 kg (155 lb 14.4 oz)     Constitutional: well-developed, appearing stated age; cooperative  ENT: No oral ulcer seen.   No mucous membrane lesions, normal saliva pool  Resp: lungs clear to auscultation  MS: Left foot braces/p ankle surgery.   All shoulder, elbow, wrist, MCP/PIP/DIP, hip, ankle joints were examined and  found normal. No active synovitis or deformity.   Skin: no rash in exposed areas  Psych: nl judgement, orientation, memory, affect.         Data:     CBC RESULTS:   Recent Labs   Lab Test  06/06/17 2048   WBC  4.7   RBC  4.09   HGB  12.0   HCT  35.9   MCV  88   MCH  29.3   MCHC  33.4   RDW  13.1   PLT  189       Liver Function Studies -   Recent Labs   Lab Test  06/06/17 2048   PROTTOTAL  6.7*   ALBUMIN  3.8   BILITOTAL  0.3   ALKPHOS  91   AST  26   ALT  44       Creatinine   Date Value Ref Range Status   05/21/2019 0.63 0.52 - 1.04 mg/dL Final   ]    No results found for: URIC]    HLA-B27  No results for input(s): [id number], B1 in the last 23715 hours.  RF/CCP  Recent Labs   Lab Test  05/06/16   1554   CCPIGG  1   RHF  <20     CYNDIE/RNP/Sm/SSA/SSB  Recent Labs   Lab Test  11/01/16   1318   TREPAB  Negative     ESR/CRP  Recent Labs   Lab Test  04/21/17   1249  04/14/17   1351   11/06/16   1341  03/11/16   1350  11/18/15   1453   SED   --    --   4  5  6   CRP  <2.9  <2.9  <2.9  <2.9  <2.9       h

## 2019-06-11 ENCOUNTER — OFFICE VISIT (OUTPATIENT)
Dept: FAMILY MEDICINE | Facility: CLINIC | Age: 24
End: 2019-06-11
Payer: COMMERCIAL

## 2019-06-11 VITALS
HEART RATE: 74 BPM | DIASTOLIC BLOOD PRESSURE: 80 MMHG | TEMPERATURE: 97.4 F | WEIGHT: 154.9 LBS | SYSTOLIC BLOOD PRESSURE: 128 MMHG | OXYGEN SATURATION: 97 % | BODY MASS INDEX: 27.44 KG/M2

## 2019-06-11 DIAGNOSIS — K59.00 CONSTIPATION, UNSPECIFIED CONSTIPATION TYPE: ICD-10-CM

## 2019-06-11 DIAGNOSIS — F51.5 NIGHTMARES: ICD-10-CM

## 2019-06-11 DIAGNOSIS — F41.1 GAD (GENERALIZED ANXIETY DISORDER): ICD-10-CM

## 2019-06-11 DIAGNOSIS — R10.84 ABDOMINAL PAIN, GENERALIZED: ICD-10-CM

## 2019-06-11 DIAGNOSIS — Z30.09 GENERAL COUNSELING FOR PRESCRIPTION OF ORAL CONTRACEPTIVES: ICD-10-CM

## 2019-06-11 DIAGNOSIS — F95.9 TIC: ICD-10-CM

## 2019-06-11 DIAGNOSIS — L29.9 PRURITUS: ICD-10-CM

## 2019-06-11 DIAGNOSIS — N17.9 ACUTE KIDNEY INJURY (H): ICD-10-CM

## 2019-06-11 DIAGNOSIS — M79.7 FIBROMYALGIA: ICD-10-CM

## 2019-06-11 DIAGNOSIS — E44.1 MILD MALNUTRITION (H): ICD-10-CM

## 2019-06-11 DIAGNOSIS — M00.9 INFECTION OF JOINT OF ANKLE (H): Primary | ICD-10-CM

## 2019-06-11 DIAGNOSIS — R11.0 NAUSEA: ICD-10-CM

## 2019-06-11 PROCEDURE — 99215 OFFICE O/P EST HI 40 MIN: CPT | Performed by: NURSE PRACTITIONER

## 2019-06-11 RX ORDER — GUANFACINE 1 MG/1
TABLET ORAL
Qty: 540 TABLET | Refills: 1 | Status: SHIPPED | OUTPATIENT
Start: 2019-06-11 | End: 2019-10-19

## 2019-06-11 RX ORDER — HYOSCYAMINE SULFATE 0.125 MG
0.125-0.25 TABLET ORAL EVERY 4 HOURS PRN
Qty: 40 TABLET | Refills: 1 | Status: SHIPPED | OUTPATIENT
Start: 2019-06-11 | End: 2020-07-27

## 2019-06-11 RX ORDER — CYPROHEPTADINE HYDROCHLORIDE 4 MG/1
8 TABLET ORAL AT BEDTIME
Qty: 180 TABLET | Refills: 2 | Status: SHIPPED | OUTPATIENT
Start: 2019-06-11 | End: 2020-03-03

## 2019-06-11 RX ORDER — COLCHICINE 0.6 MG/1
0.6 TABLET ORAL 2 TIMES DAILY
Qty: 180 TABLET | Refills: 1 | Status: SHIPPED | OUTPATIENT
Start: 2019-06-11 | End: 2019-11-04

## 2019-06-11 RX ORDER — NORGESTIMATE AND ETHINYL ESTRADIOL 0.25-0.035
KIT ORAL
Qty: 84 TABLET | Refills: 3 | Status: SHIPPED | OUTPATIENT
Start: 2019-06-11 | End: 2019-12-31

## 2019-06-11 RX ORDER — GABAPENTIN 100 MG/1
100 CAPSULE ORAL 3 TIMES DAILY PRN
Qty: 90 CAPSULE | Refills: 1 | Status: SHIPPED | OUTPATIENT
Start: 2019-06-11 | End: 2019-08-15

## 2019-06-11 RX ORDER — ONDANSETRON 8 MG/1
8 TABLET, ORALLY DISINTEGRATING ORAL EVERY 8 HOURS PRN
Qty: 180 TABLET | Refills: 1 | Status: ON HOLD | OUTPATIENT
Start: 2019-06-11 | End: 2023-02-14

## 2019-06-11 RX ORDER — PROMETHAZINE HYDROCHLORIDE 12.5 MG/1
12.5-25 TABLET ORAL EVERY 6 HOURS PRN
Qty: 30 TABLET | Refills: 3 | Status: SHIPPED | OUTPATIENT
Start: 2019-06-11 | End: 2020-07-27

## 2019-06-11 NOTE — PROGRESS NOTES
Subjective     Samara Oropeza is a 23 year old female who presents to clinic today for the following health issues:    [facility]       Hospital Follow-up Visit:    Hospital/Nursing Home/IP Rehab Facility: [facility]  Date of Admission: 05/06/2019  Date of Discharge: 05/10/2019  Reason(s) for Admission: Infection of joint of left ankle due to surgery.             Problems taking medications regularly:  None       Medication changes since discharge: None       Problems adhering to non-medication therapy:  None    Summary of hospitalization:  [facility] discharge summary reviewed  Diagnostic Tests/Treatments reviewed.  Follow up needed: Ongoing evaluation by Infectious disease and Podiatry  Other Healthcare Providers Involved in Patient s Care:         Specialist appointment - as scheduled  Update since discharge: improved. Feeling well; has some ongoing pain in ankle, as well as chronic abdominal pain but feels like she has been improving in the past few weeks. Antibiotics cause nausea and difficulty eating (which has been a chronic problem for her) Has been trying to drink protein shakes once in a while, and eats cereal with almond milk occasionally. Has noticed hair falling out.  Anxiety symptoms are stable- continues to see counselor, and is working on placing limits with family.Thinks Cyproheptadine has been helpful for nightmares, would like to try increasing the dose.     Post Discharge Medication Reconciliation: discharge medications reconciled and changed, per note/orders (see AVS).  Plan of care communicated with patient     Coding guidelines for this visit:  Type of Medical   Decision Making Face-to-Face Visit       within 7 Days of discharge Face-to-Face Visit        within 14 days of discharge   Moderate Complexity [id number]   High Complexity [id number]          Questions/Concerns: would like a refill on all medications.     -------------------------------------    Patient Active  Problem List   Diagnosis     Tics - Tourette syndrome     IBS (irritable bowel syndrome)     Allergic rhinitis     Generalized anxiety disorder     Knee pain     Concussion     Milk protein intolerance     Headaches due to old head injury     Behcet's disease (H)     Migraine     Spell of shaking     On colchicine therapy     Palpitations     Fibromyalgia     Rheumatoid arthritis of multiple sites without rheumatoid factor (H)     Raynaud's disease without gangrene     Chronic abdominal pain     Patellofemoral instability     PTSD (post-traumatic stress disorder)     Cervical dystonia     Acute left ankle pain     Cervical pain     Major depressive disorder, recurrent episode, moderate (H)     Chronic pain syndrome     Convulsions, unspecified convulsion type (H)     Transient alteration of awareness     Vitamin D deficiency     Mobile cecum     Spells of decreased attentiveness     Pelvic floor weakness     Somatic symptom disorder     Anxiety state     Aphthous ulcer     Cough     Dysthymic disorder     Gastroparesis     GERD (gastroesophageal reflux disease)     Displacement of lumbar intervertebral disc without myelopathy     Intestinal malabsorption     Iron deficiency associated with nonfamilial restless legs syndrome     Other specified symptom associated with female genital organs     Enthesopathy of hip region     Tourette's syndrome     Cellulitis     Acute abdominal pain     Infection of joint of ankle (H)     Past Surgical History:   Procedure Laterality Date     ARTHROSCOPY ANKLE, REPAIR LIGAMENT Left 1/2/2019    Procedure: Ankle arthroscopy and sinus tarsi evacuation, ligament repair, left lower extremity;  Surgeon: Andres Johnson DPM;  Location: [facility] OR     ARTHROSCOPY KNEE WITH PATELLAR REALIGNMENT  7/25/2013    Procedure: ARTHROSCOPY KNEE WITH PATELLAR REALIGNMENT;  Left Knee Arthroscopy, Medial Patellofemoral Ligament Reconstruction with Allograft  ;  Surgeon: Jennifer Acevedo MD;   Location: [facility]     COLONOSCOPY  2015     DENTAL SURGERY  1996    Teeth removal     ENDOSCOPY UPPER, COLONOSCOPY, COMBINED  2005     HC ESOPH/GAS REFLUX TEST W NASAL IMPED >1 HR N/A 2/15/2017    Procedure: ESOPHAGEAL IMPEDENCE FUNCTION TEST WITH 24 HOUR PH GREATER THAN 1 HOUR;  Surgeon: Timothy Matta MD;  Location: [facility]     IR PICC PLACEMENT > 5 YRS OF AGE  3/13/2019     IRRIGATION AND DEBRIDEMENT FOOT, COMBINED Left 3/12/2019    Procedure: COMBINED IRRIGATION AND DEBRIDEMENT LEFT ANKLE;  Surgeon: Micha Glover MD;  Location: [facility] OR     IRRIGATION AND DEBRIDEMENT LOWER EXTREMITY, COMBINED Left 5/7/2019    Procedure: 1.  Excision of wound down to and including deep fascia, less than 20 cm2.  2.  Irrigation and debridement, left ankle.;  Surgeon: Andres Johnson DPM;  Location: [facility] OR     PICC INSERTION Left 05/05/2019    4Fr - 45cm (5cm external), Basilic vein, SVC RA junction       Social History     Tobacco Use     Smoking status: Never Smoker     Smokeless tobacco: Never Used   Substance Use Topics     Alcohol use: Yes     Alcohol/week: 0.6 oz     Types: 1 Standard drinks or equivalent per week     Comment: rarely     Family History   Problem Relation Age of Onset     Depression Mother      Neurologic Disorder Mother         Migraines, take imitrex injection.  Also in maternal grandmother.       Alcohol/Drug Father      Hypertension Father      Depression Father      Osteoarthritis Father      Cardiovascular Maternal Grandmother      Depression Maternal Grandmother      Hypertension Maternal Grandmother      Alzheimer Disease Maternal Grandmother      Cardiovascular Maternal Grandfather      Hypertension Maternal Grandfather      Depression Maternal Grandfather      Alcohol/Drug Maternal Grandfather      Cardiovascular Paternal Grandmother      Hypertension Paternal Grandmother      Cardiovascular Paternal Grandfather      Hypertension Paternal Grandfather      Glaucoma No family hx of       Macular Degeneration No family hx of            -------------------------------------  Reviewed and updated as needed this visit by Provider         Review of Systems   ROS COMP: Constitutional, HEENT, cardiovascular, pulmonary, gi and gu systems are negative, except as otherwise noted.      Objective    /80   Pulse 74   Temp 97.4  F (36.3  C) (Oral)   Wt 70.3 kg (154 lb 14.4 oz)   SpO2 97%   BMI 27.44 kg/m    Body mass index is 27.44 kg/m .  Physical Exam   GENERAL: healthy, alert and no distress  NECK: no adenopathy, no asymmetry, masses, or scars and thyroid normal to palpation  RESP: lungs clear to auscultation - no rales, rhonchi or wheezes  CV: regular rate and rhythm, normal S1 S2, no S3 or S4, no murmur, click or rub, no peripheral edema and peripheral pulses strong  ABDOMEN: soft, nontender, no hepatosplenomegaly, no masses and bowel sounds normal  MS: RLE exam shows in boot    Diagnostic Test Results:  Labs reviewed in Epic  No results found. However, due to the size of the patient record, not all encounters were searched. Please check Results Review for a complete set of results.        Assessment & Plan   Problem List Items Addressed This Visit     Tics - Tourette syndrome    Relevant Medications    guanFACINE (TENEX) 1 MG tablet    Infection of joint of ankle (H) - Primary    Fibromyalgia    Relevant Medications    amitriptyline (ELAVIL) 25 MG tablet      Other Visit Diagnoses     Constipation, unspecified constipation type        Relevant Medications    ondansetron (ZOFRAN-ODT) 8 MG ODT tab    Acute kidney injury (H)        Relevant Medications    ondansetron (ZOFRAN-ODT) 8 MG ODT tab    Nausea        Relevant Medications    promethazine (PHENERGAN) 12.5 MG tablet    ondansetron (ZOFRAN-ODT) 8 MG ODT tab    Nightmares        Relevant Medications    cyproheptadine (PERIACTIN) 4 MG tablet    amitriptyline (ELAVIL) 25 MG tablet    gabapentin (NEURONTIN) 100 MG capsule    Pruritus         "Relevant Medications    promethazine (PHENERGAN) 12.5 MG tablet    cyproheptadine (PERIACTIN) 4 MG tablet    amitriptyline (ELAVIL) 25 MG tablet    TAHIR (generalized anxiety disorder)        Relevant Medications    amitriptyline (ELAVIL) 25 MG tablet    gabapentin (NEURONTIN) 100 MG capsule    Abdominal pain, generalized        Relevant Medications    hyoscyamine (ANASPAZ/LEVSIN) 0.125 MG tablet    colchicine (COLCYRS) 0.6 MG tablet    General counseling for prescription of oral contraceptives        Relevant Medications    SPRINTEC 28 0.25-35 MG-MCG tablet    Mild malnutrition (H)        Relevant Orders    NUTRITION REFERRAL         -discussed nutrition referral to work on getting calories; discussed ensure, but there are interactions with her current antibiotics that impact absorption.  We will also add Phenergan and Zofran as needed for nausea.  -increased dose of Cyproheptadine for nightmares- it may also have the side effect of increasing appetite.  -tics, anxiety otherwise generally stable  -constipation stable- advised to not add other anti-histamines to current regimen, which may exacerbate constipation    BMI:   Estimated body mass index is 27.44 kg/m  as calculated from the following:    Height as of 6/4/19: 1.6 m (5' 3\").    Weight as of this encounter: 70.3 kg (154 lb 14.4 oz).           See Patient Instructions  No follow-ups on file.    [facility] spent greater than 50% of this 40 minute appointment were spent in face to face counseling with the patient of the issues described above in the history of present illness and in the plan, including medication changes      "

## 2019-06-12 ENCOUNTER — OFFICE VISIT (OUTPATIENT)
Dept: RHEUMATOLOGY | Facility: CLINIC | Age: 24
End: 2019-06-12
Payer: COMMERCIAL

## 2019-06-12 VITALS
OXYGEN SATURATION: 100 % | TEMPERATURE: 97.7 F | HEIGHT: 63 IN | BODY MASS INDEX: 27.29 KG/M2 | SYSTOLIC BLOOD PRESSURE: 116 MMHG | DIASTOLIC BLOOD PRESSURE: 84 MMHG | WEIGHT: 154 LBS | HEART RATE: 95 BPM

## 2019-06-12 DIAGNOSIS — M35.2 BEHCET'S DISEASE (H): Primary | ICD-10-CM

## 2019-06-12 PROCEDURE — 99213 OFFICE O/P EST LOW 20 MIN: CPT | Performed by: INTERNAL MEDICINE

## 2019-06-12 ASSESSMENT — MIFFLIN-ST. JEOR: SCORE: 1417.67

## 2019-06-12 ASSESSMENT — ROUTINE ASSESSMENT OF PATIENT INDEX DATA (RAPID3)
RAPID3 INTERPRETATION: HIGH > 12.0
TOTAL RAPID3 SCORE: 15.3

## 2019-06-12 NOTE — NURSING NOTE
"Chief Complaint   Patient presents with     RECHECK       Initial /84   Pulse 95   Temp 97.7  F (36.5  C) (Oral)   Ht 1.6 m (5' 3\")   Wt 69.9 kg (154 lb)   SpO2 100%   BMI 27.28 kg/m   Estimated body mass index is 27.28 kg/m  as calculated from the following:    Height as of this encounter: 1.6 m (5' 3\").    Weight as of this encounter: 69.9 kg (154 lb).  Medication Reconciliation: complete    Have you ever seen a rheumatologist yes Who you When 1/9/19  Joint pain history  Onset: Patient is here for a follow up. A lot of infections and in and out of the hospital, who wanted her to follow up with Rheumatology again.  Involved joints: knees, legs, back, neck, shoulders, ankles  Pain scale:  6.5/10     Wakes the patient from sleep : Yes  Morning stiffness:Yes for 120 minutes  Meds used:magic mouthwash, colchicine    Interim history  Since last visit:  1. Infections - Yes, staph  2. New symptoms/medical problem - kidney failure  3. Any side effects from Rheum medications -none  3. ER visits/Hospitalizations/surgeries - Yes, 3 hospitalizations, and surgeries,  4. Last PCP visit: 6/11/19  Wt Readings from Last 4 Encounters:   06/12/19 69.9 kg (154 lb)   06/11/19 70.3 kg (154 lb 14.4 oz)   06/04/19 69.4 kg (153 lb)   05/28/19 69.4 kg (153 lb)     BP Readings from Last 3 Encounters:   06/12/19 116/84   06/11/19 128/80   05/23/19 114/76       "

## 2019-06-13 ENCOUNTER — OFFICE VISIT (OUTPATIENT)
Dept: PSYCHOLOGY | Facility: CLINIC | Age: 24
End: 2019-06-13
Payer: COMMERCIAL

## 2019-06-13 DIAGNOSIS — F33.1 MAJOR DEPRESSIVE DISORDER, RECURRENT EPISODE, MODERATE (H): Primary | ICD-10-CM

## 2019-06-13 DIAGNOSIS — F41.1 GAD (GENERALIZED ANXIETY DISORDER): ICD-10-CM

## 2019-06-13 PROCEDURE — 90834 PSYTX W PT 45 MINUTES: CPT | Performed by: COUNSELOR

## 2019-06-13 ASSESSMENT — ANXIETY QUESTIONNAIRES
6. BECOMING EASILY ANNOYED OR IRRITABLE: NOT AT ALL
GAD7 TOTAL SCORE: 4
2. NOT BEING ABLE TO STOP OR CONTROL WORRYING: NOT AT ALL
IF YOU CHECKED OFF ANY PROBLEMS ON THIS QUESTIONNAIRE, HOW DIFFICULT HAVE THESE PROBLEMS MADE IT FOR YOU TO DO YOUR WORK, TAKE CARE OF THINGS AT HOME, OR GET ALONG WITH OTHER PEOPLE: SOMEWHAT DIFFICULT
3. WORRYING TOO MUCH ABOUT DIFFERENT THINGS: SEVERAL DAYS
1. FEELING NERVOUS, ANXIOUS, OR ON EDGE: SEVERAL DAYS
5. BEING SO RESTLESS THAT IT IS HARD TO SIT STILL: SEVERAL DAYS
7. FEELING AFRAID AS IF SOMETHING AWFUL MIGHT HAPPEN: NOT AT ALL

## 2019-06-13 ASSESSMENT — PATIENT HEALTH QUESTIONNAIRE - PHQ9
SUM OF ALL RESPONSES TO PHQ QUESTIONS 1-9: 8
5. POOR APPETITE OR OVEREATING: SEVERAL DAYS

## 2019-06-13 NOTE — Clinical Note
Phu Casillas,I wonder if Samara can benefit from support from a care coordinator. She has complex medical needs, and I'll not sure how often her specialty providers coordinate care. Samara can be a passive communicator at times too. Please advise.Thank you,Ernestina Patel MA, [facility]
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
## 2019-06-14 ASSESSMENT — ANXIETY QUESTIONNAIRES: GAD7 TOTAL SCORE: 4

## 2019-06-17 ENCOUNTER — PATIENT OUTREACH (OUTPATIENT)
Dept: CARE COORDINATION | Facility: CLINIC | Age: 24
End: 2019-06-17

## 2019-06-17 NOTE — PROGRESS NOTES
Clinic Care Coordination Contact    Clinic Care Coordination Contact  OUTREACH    Referral Information:     Pro-active outreach       Chief Complaint   Patient presents with     Clinic Care Coordination - Follow-up     RN        Universal Utilization: frequent no show for clinic appointments; frequent ED/hospitalizations     Utilization    Last refreshed: 6/16/2019  7:04 AM:  Hospital Admissions 3           Last refreshed: 6/16/2019  7:04 AM:  ED Visits 6           Last refreshed: 6/16/2019  7:04 AM:  No Show Count (past year) 11              Current as of: 6/16/2019  7:04 AM              Clinical Concerns:  Current Medical Concerns:  Saw rheumatology yesterday. Saw PCP last week    Current Behavioral Concerns: anxiety-seeing therapist    Education Provided to patient: none      Health Maintenance Reviewed:    Clinical Pathway: None    Medication Management:  Increased dose of nightmare medication     Functional Status:   independent    Living Situation:   living with boyfriend    Diet/Exercise/Sleep:  No concerns     Transportation:   boyfriend        Psychosocial:   no concerns     Financial/Insurance:      Not discussed     Resources and Interventions:  Current Resources:    ;                         Goals:   Goals        General    #1 Financial Wellbeing (pt-stated)     Notes - Note created  3/28/2019  2:58 PM by Maeve Purcell BSW    Goal Statement: I need help applying for disability    Measure of Success: Pt will apply for disability   Supportive Steps to Achieve: care coordination   Barriers: unable to navigate on own  Strengths: seeking help  Date to Achieve By: 5/1/19   Patient expressed understanding of goal: yes                  Patient/Caregiver understanding: good    Outreach Frequency: monthly  Future Appointments              In 1 week Ernestina Patel [facility]    In 3 weeks [facility]    In 4  Andres Perdue DPM [facility]    In 1 month Ernestina Patel [facility]    In 1 month Alejandrina Hernandez MD [facility] Physical Medicine and Rehabilitation, [facility]    In 1 month Ernestina Patel [facility], [facility]    In 2 months [facility]    In 4 months Joseph De La Torre MD [facility] Multiple Sclerosis, [facility]          Plan: keep appts as scheduled with therapist. Mailed complex care plan to home. Follow up approximately one month    Teresa Cam RN  [facility]-Care Coordination  [facility]  [phone]

## 2019-06-17 NOTE — LETTER
[facility]  Complex Care Plan  About Me:    Patient Name:  Samara Bah    YOB: 1995  Age:         23 year old   Codie MRN:    [id number] Telephone Information:  Home Phone [phone]   Mobile [phone]       Address:  [address]  [address]  [place] [address] Email address:  [email or web address]      Emergency Contact(s)    Name Relationship Lgl Grd Work Phone Home Phone Mobile Phone   1. TRICIA MARIXA* Mother No  [phone]   2. MALGORZATA BAH Father No  [phone]   3. HOLLY CLARKE* Step parent No  [phone]   4. KENIA PERRY* Grandparent No  [phone]           Primary language:  English     needed? No   [place] [facility]:  [phone] op. 1  Other communication barriers:    Preferred Method of Communication:  Robert  Current living arrangement:    Mobility Status/ Medical Equipment:      Health Maintenance  Health Maintenance Reviewed:      My Access Plan  Medical Emergency 911   Primary Clinic Line [facility], [place] - [phone]   24 Hour Appointment Line [phone] or  [phone] ([phone]) (toll-free)   24 Hour Nurse Line [phone] (toll-free)   Preferred Urgent Care     Preferred Hospital     Preferred Pharmacy [facility] [place], [place] - [address] [place] [address]     Behavioral Health Crisis Line The National Suicide Prevention Lifeline at 1-681.696.8405 or 911             My Care Team Members  Patient Care Team       Relationship Specialty Notifications Start End    Sonja Abreu APRN CNP PCP - General Nurse Practitioner  11/3/17     Phone: [phone] Fax: [phone]         [address] [address]    Jennifer Acevedo MD MD Orthopedics  7/16/13     Phone: [phone] Fax: [phone]         [address] [facility] [address]    Lilliana Miramontes, APRN CNP Nurse  Practitioner Nurse Practitioner  5/11/15     Phone: [phone] Fax: [phone]         [address] [address]    Cristy Russell, RN Nurse Coordinator Neurology Admissions 6/18/15     General Neurology    Phone: [phone] Pager: [phone] Fax: [phone]       Austen Marquez MD MD Rheumatology Admissions 9/16/15     Phone: [phone] Fax: [phone]         [address] [place] [address]    Umer Heaton MD MD Ophthalmology  1/11/16     Phone: [phone] Fax: [phone]         [address] [place] [address]    Suzy Harman MD MD Family Medicine - Sports Medicine  1/19/16     Phone: [phone] Fax: [phone]         [address] [place] [address]    Esau Elliott MD MD Dermatology  9/14/16     Phone: [phone] Fax: [phone]         [address] [place] [address]    Frederick Bender MD MD Physical Medicine and Rehab  4/4/17     Phone: [phone] Fax: [phone]         [address] [place] [address]    Vidya Bryson, RN Clinic Care Coordinator Physical Medicine and Rehab  4/4/17     Phone: [phone] Pager: [phone] Fax: [phone]        [facility] [address] [facility] [address] [facility] [address]    Marcelle Richardson, PT Physical Therapist Physical Medicine and Rehab  4/4/17     Phone: [phone] Fax: [phone]         [address] [address]    Cata Hurst, NP Nurse Practitioner Nurse Practitioner Psych/Mental Health  6/27/17     Phone: [phone] Fax: [phone]         [facility] [address] [facility] [address]    HUSSEIN Lynn MD MD Cardiology  2/27/18     Phone: [phone] Fax: [phone]         [address] [place] [address] [address] [facility] [address]    Calli Cam, RN Lead Care Coordinator Primary Care - CC Admissions 4/2/19     Phone: [phone] Fax: [phone]         Sonja Abreu APRN CNP Assigned PCP   4/26/19     Phone: [phone] Fax: [phone]         [address] [address]            My Care Plans  Self Management and Treatment Plan  Goals and (Comments)  Goals        General    #1 Financial Wellbeing (pt-stated)     Notes - Note created  3/28/2019  2:58 PM by Maeve Purcell BSW    Goal Statement: I need help applying for disability    Measure of Success: Pt will apply for disability   Supportive Steps to Achieve: care coordination   Barriers: unable to navigate on own  Strengths: seeking help  Date to Achieve By: 5/1/19   Patient expressed understanding of goal: yes                 Action Plans on File:            Depression     Migraine    Advance Care Plans/Directives Type:        My Medical and Care Information  Problem List   Patient Active Problem List   Diagnosis     Tics - Tourette syndrome     IBS (irritable bowel syndrome)     Allergic rhinitis     Generalized anxiety disorder     Knee pain     Concussion     Milk protein intolerance     Headaches due to old head injury     Behcet's disease (H)     Migraine     Spell of shaking     On colchicine therapy     Palpitations     Fibromyalgia     Rheumatoid arthritis of multiple sites without rheumatoid factor (H)     Raynaud's disease without gangrene     Chronic abdominal pain     Patellofemoral instability     PTSD (post-traumatic stress disorder)     Cervical dystonia     Acute left ankle pain     Cervical pain     Major depressive disorder, recurrent episode, moderate (H)     Chronic pain syndrome     Convulsions, unspecified convulsion type (H)     Transient alteration of awareness     Vitamin D deficiency     Mobile cecum     Spells of decreased attentiveness     Pelvic floor weakness     Somatic symptom disorder     Anxiety state     Aphthous ulcer     Cough     Dysthymic disorder     Gastroparesis     GERD (gastroesophageal reflux disease)     Displacement of lumbar  intervertebral disc without myelopathy     Intestinal malabsorption     Iron deficiency associated with nonfamilial restless legs syndrome     Other specified symptom associated with female genital organs     Enthesopathy of hip region     Tourette's syndrome     Cellulitis     Acute abdominal pain     Infection of joint of ankle (H)      Current Medications and Allergies:  See printed Medication Report.    Care Coordination Start Date: 3/21/2019   Frequency of Care Coordination: monthly   Form Last Updated: 06/17/2019

## 2019-06-25 ENCOUNTER — OFFICE VISIT (OUTPATIENT)
Dept: PSYCHOLOGY | Facility: CLINIC | Age: 24
End: 2019-06-25
Payer: COMMERCIAL

## 2019-06-25 ENCOUNTER — OFFICE VISIT (OUTPATIENT)
Dept: FAMILY MEDICINE | Facility: CLINIC | Age: 24
End: 2019-06-25
Payer: COMMERCIAL

## 2019-06-25 VITALS
WEIGHT: 153 LBS | DIASTOLIC BLOOD PRESSURE: 66 MMHG | BODY MASS INDEX: 27.1 KG/M2 | RESPIRATION RATE: 14 BRPM | OXYGEN SATURATION: 97 % | SYSTOLIC BLOOD PRESSURE: 114 MMHG | TEMPERATURE: 96.1 F | HEART RATE: 89 BPM

## 2019-06-25 DIAGNOSIS — R13.10 DYSPHAGIA, UNSPECIFIED TYPE: Primary | ICD-10-CM

## 2019-06-25 DIAGNOSIS — F33.1 MAJOR DEPRESSIVE DISORDER, RECURRENT EPISODE, MODERATE (H): Primary | ICD-10-CM

## 2019-06-25 DIAGNOSIS — R07.0 THROAT PAIN: ICD-10-CM

## 2019-06-25 DIAGNOSIS — F41.1 GAD (GENERALIZED ANXIETY DISORDER): ICD-10-CM

## 2019-06-25 PROCEDURE — 99214 OFFICE O/P EST MOD 30 MIN: CPT | Performed by: PHYSICIAN ASSISTANT

## 2019-06-25 PROCEDURE — 90834 PSYTX W PT 45 MINUTES: CPT | Performed by: COUNSELOR

## 2019-06-25 ASSESSMENT — PATIENT HEALTH QUESTIONNAIRE - PHQ9
5. POOR APPETITE OR OVEREATING: SEVERAL DAYS
SUM OF ALL RESPONSES TO PHQ QUESTIONS 1-9: 8

## 2019-06-25 ASSESSMENT — ANXIETY QUESTIONNAIRES
3. WORRYING TOO MUCH ABOUT DIFFERENT THINGS: SEVERAL DAYS
6. BECOMING EASILY ANNOYED OR IRRITABLE: NOT AT ALL
2. NOT BEING ABLE TO STOP OR CONTROL WORRYING: NOT AT ALL
7. FEELING AFRAID AS IF SOMETHING AWFUL MIGHT HAPPEN: NOT AT ALL
GAD7 TOTAL SCORE: 4
1. FEELING NERVOUS, ANXIOUS, OR ON EDGE: SEVERAL DAYS
IF YOU CHECKED OFF ANY PROBLEMS ON THIS QUESTIONNAIRE, HOW DIFFICULT HAVE THESE PROBLEMS MADE IT FOR YOU TO DO YOUR WORK, TAKE CARE OF THINGS AT HOME, OR GET ALONG WITH OTHER PEOPLE: SOMEWHAT DIFFICULT
5. BEING SO RESTLESS THAT IT IS HARD TO SIT STILL: SEVERAL DAYS

## 2019-06-25 NOTE — PROGRESS NOTES
This is a recent snapshot of the patient's [facility] medical record.  For current drug dose and complete information and questions, call [phone] or In Basket pool, [facility] home infusion ([id number])  CSN Number:  [id number]
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
## 2019-06-25 NOTE — PATIENT INSTRUCTIONS
Call to schedule your imaging:    [place] or [facility] [phone] or [phone]    [facility]     [phone] or [phone]

## 2019-06-25 NOTE — PROGRESS NOTES
This is a recent snapshot of the patient's [facility] medical record.  For current drug dose and complete information and questions, call [phone] or In Basket pool, [facility] home infusion ([id number])  CSN Number:  [id number]

## 2019-06-25 NOTE — PROGRESS NOTES
Subjective     Samara Oropeza is a 23 year old female who presents to clinic today for the following health issues:    HPI   chest pain      Duration: saturday    Description (location/character/radiation): patient states that she is having moderate to severe pain in her chest and throat when she swallows food, including liquid, dull ache but feels tight when she is trying to eat. I asked if she had hx of heart burn and she does but states that this is totally different    Intensity:  moderate, severe    Accompanying signs and symptoms: see above, no N/V but does have stomach pain    History (similar episodes/previous evaluation): None    Precipitating or alleviating factors: None    Therapies tried and outcome: None     Patient Active Problem List   Diagnosis     Tics - Tourette syndrome     IBS (irritable bowel syndrome)     Allergic rhinitis     Generalized anxiety disorder     Knee pain     Concussion     Milk protein intolerance     Headaches due to old head injury     Behcet's disease (H)     Migraine     Spell of shaking     On colchicine therapy     Palpitations     Fibromyalgia     Rheumatoid arthritis of multiple sites without rheumatoid factor (H)     Raynaud's disease without gangrene     Chronic abdominal pain     Patellofemoral instability     PTSD (post-traumatic stress disorder)     Cervical dystonia     Acute left ankle pain     Cervical pain     Major depressive disorder, recurrent episode, moderate (H)     Chronic pain syndrome     Convulsions, unspecified convulsion type (H)     Transient alteration of awareness     Vitamin D deficiency     Mobile cecum     Spells of decreased attentiveness     Pelvic floor weakness     Somatic symptom disorder     Aphthous ulcer     Gastroparesis     GERD (gastroesophageal reflux disease)     Displacement of lumbar intervertebral disc without myelopathy     Intestinal malabsorption     Iron deficiency associated with nonfamilial restless legs syndrome      Enthesopathy of hip region     Tourette's syndrome     Cellulitis     Infection of joint of ankle (H)     Past Surgical History:   Procedure Laterality Date     ARTHROSCOPY ANKLE, REPAIR LIGAMENT Left 1/2/2019    Procedure: Ankle arthroscopy and sinus tarsi evacuation, ligament repair, left lower extremity;  Surgeon: Andres Johnson DPM;  Location: [facility] OR     ARTHROSCOPY KNEE WITH PATELLAR REALIGNMENT  7/25/2013    Procedure: ARTHROSCOPY KNEE WITH PATELLAR REALIGNMENT;  Left Knee Arthroscopy, Medial Patellofemoral Ligament Reconstruction with Allograft  ;  Surgeon: Jennifer Acevedo MD;  Location: [facility] OR     COLONOSCOPY  2015     DENTAL SURGERY  1996    Teeth removal     ENDOSCOPY UPPER, COLONOSCOPY, COMBINED  2005     HC ESOPH/GAS REFLUX TEST W NASAL IMPED >1 HR N/A 2/15/2017    Procedure: ESOPHAGEAL IMPEDENCE FUNCTION TEST WITH 24 HOUR PH GREATER THAN 1 HOUR;  Surgeon: Timothy Matta MD;  Location: [facility] GI     IR PICC PLACEMENT > 5 YRS OF AGE  3/13/2019     IRRIGATION AND DEBRIDEMENT FOOT, COMBINED Left 3/12/2019    Procedure: COMBINED IRRIGATION AND DEBRIDEMENT LEFT ANKLE;  Surgeon: Micha Glover MD;  Location: [facility] OR     IRRIGATION AND DEBRIDEMENT LOWER EXTREMITY, COMBINED Left 5/7/2019    Procedure: 1.  Excision of wound down to and including deep fascia, less than 20 cm2.  2.  Irrigation and debridement, left ankle.;  Surgeon: Andres Johnson DPM;  Location: [facility] OR     PICC INSERTION Left 05/05/2019    4Fr - 45cm (5cm external), Basilic vein, SVC RA junction       Social History     Tobacco Use     Smoking status: Never Smoker     Smokeless tobacco: Never Used   Substance Use Topics     Alcohol use: Yes     Alcohol/week: 0.6 oz     Types: 1 Standard drinks or equivalent per week     Comment: rarely     Family History   Problem Relation Age of Onset     Depression Mother      Neurologic Disorder Mother         Migraines, take imitrex injection.  Also in maternal grandmother.        Alcohol/Drug Father      Hypertension Father      Depression Father      Osteoarthritis Father      Cardiovascular Maternal Grandmother      Depression Maternal Grandmother      Hypertension Maternal Grandmother      Alzheimer Disease Maternal Grandmother      Cardiovascular Maternal Grandfather      Hypertension Maternal Grandfather      Depression Maternal Grandfather      Alcohol/Drug Maternal Grandfather      Cardiovascular Paternal Grandmother      Hypertension Paternal Grandmother      Cardiovascular Paternal Grandfather      Hypertension Paternal Grandfather      Glaucoma No family hx of      Macular Degeneration No family hx of          BP Readings from Last 3 Encounters:   06/25/19 114/66   06/12/19 116/84   06/11/19 128/80    Wt Readings from Last 3 Encounters:   06/25/19 69.4 kg (153 lb)   06/12/19 69.9 kg (154 lb)   06/11/19 70.3 kg (154 lb 14.4 oz)          Reviewed and updated as needed this visit by Provider  Tobacco  Allergies  Meds  Problems  Med Hx  Surg Hx  Fam Hx  Soc Hx          Additional complaints: None    HPI additional notes: Samara presents today with   Chief Complaint   Patient presents with     Chest Pain   Heating pad gives some relief.  Has pain at night, worse when swallowing or eating.  Has never had similar pain in the past.  No hemoptysis.  Has pain swallowing water.         Review of Systems   C: Negative for fever, chills, recent change in weight  Skin: Negative for worrisome rashes or lesions  ENT/MOUTH:POSITIVE for sore throat, pain with swallowing and swollen glands.  Negative for congestion, ear pain, post-nasal drainage and sinus pressure.  Resp: Negative for significant cough or SOB  CV: Negative for chest pain or peripheral edema  GI:POSITIVE for abdominal pain epigastric, constipation, gas or bloating, Hx GERD, Hx IBS and nausea  MS: Negative for significant arthralgias or myalgias  P: Negative for changes in mood or affect  ROS all other systems negative.         Objective    /66   Pulse 89   Temp 96.1  F (35.6  C) (Tympanic)   Resp 14   Wt 69.4 kg (153 lb)   SpO2 97%   BMI 27.10 kg/m    Body mass index is 27.1 kg/m .  Physical Exam   GENERAL: healthy, alert, in no acute distress  EYES: Grossly normal to inspection, EOMI, PERRL  HENT: Ear canals normal; TMs pearly gray without effusion. Nasal mucosa moist without edema or discharge. Oral mucous membranes moist, no lesions or ulcerations. Pharynx pink.  Uvula midline.  No postnasal drainage. Sinuses non-tender to palpation.  NECK: Non-tender, no adenopathy.  RESP: lungs clear to auscultation - no rales, no rhonchi, no wheezes  CV: regular rate and rhythm, normal S1 S2. No peripheral edema.  ABDOMEN: soft. Tender to deep palpation of Epigastrium. No hepatosplenomegaly or masses. Normal bowel sounds in all four quadrants. Whitehead's negative, Rovsing's negative. No rebound.  MS: tenderness to palpation of sternum and trachea   SKIN: no suspicious lesions, no rashes  PSYCH: Alert and oriented times 3;  Able to articulate logical thoughts. Affect is normal.    Diagnostic test results:  Pending        Assessment & Plan       ICD-10-CM    1. Dysphagia, unspecified type R13.10 XR Video Swallow w Esophagram   2. Throat pain R07.0      Complicated pt with history of multiple chronic GI problems.     Unsure of cause of pain, visual inspection of oropharynx was normal.  She did have some tenderness to palpation of the anterior and bilateral lateral neck as well as the sternum.  She had a normal lung and heart exam but did complain of palpitations.  As she is complaining of swallowing difficulties, including when drinking water, I will get a swallowing study for further evaluation.     If results are normal, will have pt follow up with GI for further evaluation and possible endoscopy.    Please see patient instructions for treatment details.    Return in about 2 weeks (around 7/9/2019) for Recheck if not improving,  Specialist Appt as referred.    Laurie Gómez PA-C  [facility]

## 2019-06-26 ASSESSMENT — ANXIETY QUESTIONNAIRES: GAD7 TOTAL SCORE: 4

## 2019-07-05 ENCOUNTER — PATIENT OUTREACH (OUTPATIENT)
Dept: CARE COORDINATION | Facility: CLINIC | Age: 24
End: 2019-07-05

## 2019-07-05 NOTE — PROGRESS NOTES
Clinic Care Coordination Contact  Care Team Conversations    Chart reviewed. Patient with no current nursing needs. Will close to care coordination services at this time.     Teresa Cam RN  [facility]-Care Coordination  [facility]  [phone]

## 2019-07-16 ENCOUNTER — OFFICE VISIT (OUTPATIENT)
Dept: PODIATRY | Facility: CLINIC | Age: 24
End: 2019-07-16
Payer: COMMERCIAL

## 2019-07-16 VITALS — HEIGHT: 63 IN | RESPIRATION RATE: 12 BRPM | BODY MASS INDEX: 27.11 KG/M2 | WEIGHT: 153 LBS

## 2019-07-16 DIAGNOSIS — G89.29 CHRONIC PAIN OF LEFT ANKLE: ICD-10-CM

## 2019-07-16 DIAGNOSIS — Z98.890 S/P FOOT SURGERY, LEFT: Primary | ICD-10-CM

## 2019-07-16 DIAGNOSIS — Z98.890 S/P ANKLE LIGAMENT REPAIR: ICD-10-CM

## 2019-07-16 DIAGNOSIS — M25.572 CHRONIC PAIN OF LEFT ANKLE: ICD-10-CM

## 2019-07-16 PROCEDURE — 99213 OFFICE O/P EST LOW 20 MIN: CPT | Performed by: PODIATRIST

## 2019-07-16 ASSESSMENT — MIFFLIN-ST. JEOR: SCORE: 1413.13

## 2019-07-16 NOTE — PROGRESS NOTES
"Foot & Ankle Surgery  July 16, 2019    S:  Patient in today approx 10 weeks sp washout/debridement of left ankle wound.  Pain levels still high.  Using the ankle brace but she continues to have pain/instability of the ankle.  She was taken off the antibiotics about 3 1/2 weeks ago by ID.  While she has discomfort, the incision has healed nicely, there no redness, drainage or other SOI.  Previous MRI during last hospitalization neg for evidence of bone infection    Resp 12   Ht 1.6 m (5' 3\")   Wt 69.4 kg (153 lb)   BMI 27.10 kg/m        ROS - positive for CC.  Patient denies current nausea, vomiting, chills, fevers, belly pain, calf pain, chest pain or SOB.  Complete remainder of ROS is otherwise neg.    PE - incision healed.  Minimal edema, no increased skin temp, no drainage.  No local SOI.  Anterior drawer limited by pain.  Skin shows no trophic, color or temperature changes otherwise.  No calf redness, swelling or pain noted otherwise.    A/P - 23 year old yo patient approx 10 weeks sp above procedure  -no evidence of recurrent infection, and she's been off the antibiotics for nearly 1 month  -discussed revision surgery.  Plan is for ankle arthroscopy and open revision ATFL repair, although arthrobrostrum is certainly an option.    -surg agustina handout dispensed    Job duties; time off work - not working currently  Smoking history - neg  Vit D - n/a  Diabetic/A1c - neg  Hemoglobin - draw prior to surgery  Clot history - neg  Allergies to surgical implants/suture - neg  Allergies/issues with narcotics - neg    Procedure(s):  1.  Left ankle arthroscopy with lateral ligament repair  Consent: above  Diagnosis:  Left ankle instability/pain  Equipment/Vendor: Arthrex internal brace/arthrobrostrum        Follow up  -  Prn for surg consult or sooner with acute issues       Body mass index is 27.1 kg/m .  Weight management plan: Patient was referred to their PCP to discuss a diet and exercise plan.      Andres PINEDO" ROSIBEL Johnson FACFAS FACFAOM  Podiatric Foot & Ankle Surgeon  [facility]  [phone]

## 2019-07-16 NOTE — PATIENT INSTRUCTIONS
"Thank you for choosing [facility]! We look forward to seeing you at your upcoming appointments.    DR. CRISOSTOMO'S CLINIC LOCATIONS:   MONDAY - [place] TUESDAY - [place]   [address] [facility]  [address]   [place] [address] [place] [address]   [phone]       THURSDAY AM - [place] THURSDAY PM - [place]   [address]   [place] [address] [place] [address]   [phone]       FRIDAY AM - [place] SET UP SURGERY: [phone]   [address] APPOINTMENTS: [phone]   [place] [address] BILLING QUESTIONS: [phone] FAX NUMBER: [phone]     FOOT & ANKLE SURGERY PLANNING CHECKLIST  If you have decided to have surgery, follow these 5 steps to get the procedure scheduled and to have the proper paperwork filled out. If you are unsure about surgery or would like to sit down and further discuss your issue and treatment options, please make an appointment.    1.  Pick the date that you would like to have surgery. Surgery is done on a Wednesday at [facility]. Keep in mind that you will likely need at least 2 weeks off after the procedure for proper rest and healing.    2.  Call the surgery scheduling line at [phone] to get the procedure scheduled. Our  will also help you make your pre-operative physical with a primary doctor, your surgery consult appointment with me and your one week follow up after surgery for your first dressing change.    3.  If our surgery scheduler does not make your surgery consultation appointment with me then call to schedule that. When making the appointment, say \"I need to make a 30 minute surgical consult appointment\". It is recommended that you bring a spouse, family member or friend with you. There will be lots of information presented. It can be overwhelming and it is better to have someone there to help sort out the " details.    4. Contact your employer to request time off from work if needed. If there is going to be FMLA used, please have them fax the forms to [phone] at least one week prior to surgery.    * If you have any post-operative questions regarding your procedure, call our triage team at the [facility] at [phone] (option 2 > option 3)

## 2019-07-17 ENCOUNTER — TELEPHONE (OUTPATIENT)
Dept: PODIATRY | Facility: CLINIC | Age: 24
End: 2019-07-17

## 2019-07-17 NOTE — TELEPHONE ENCOUNTER
Scheduled surgery    Type of surgery: left ankle arthroscopy with ligament repair  Location of surgery: [facility]  Date and time of surgery: 9/25/19 @ 0730  Surgeon: Elizabeth  Pre-Op Appt Date: 9/17/19, Sonja Abreu  Post-Op Appt Date: 10/8/19   Packet sent out: Yes  Pre-cert/Authorization completed:  Yes  Date: 7/17/19      Jim Brennan, Surgery Scheduler

## 2019-07-22 ENCOUNTER — OFFICE VISIT (OUTPATIENT)
Dept: PSYCHOLOGY | Facility: CLINIC | Age: 24
End: 2019-07-22
Payer: COMMERCIAL

## 2019-07-22 DIAGNOSIS — F41.1 GAD (GENERALIZED ANXIETY DISORDER): ICD-10-CM

## 2019-07-22 DIAGNOSIS — F33.1 MAJOR DEPRESSIVE DISORDER, RECURRENT EPISODE, MODERATE (H): Primary | ICD-10-CM

## 2019-07-22 PROCEDURE — 90834 PSYTX W PT 45 MINUTES: CPT | Performed by: COUNSELOR
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
## 2019-07-24 ENCOUNTER — OFFICE VISIT (OUTPATIENT)
Dept: PHYSICAL MEDICINE AND REHAB | Facility: CLINIC | Age: 24
End: 2019-07-24
Payer: COMMERCIAL

## 2019-07-24 VITALS
OXYGEN SATURATION: 100 % | SYSTOLIC BLOOD PRESSURE: 122 MMHG | DIASTOLIC BLOOD PRESSURE: 84 MMHG | TEMPERATURE: 98 F | BODY MASS INDEX: 26.22 KG/M2 | HEIGHT: 63 IN | WEIGHT: 148 LBS | HEART RATE: 101 BPM | RESPIRATION RATE: 16 BRPM

## 2019-07-24 DIAGNOSIS — G43.719 CHRONIC MIGRAINE WITHOUT AURA, INTRACTABLE, WITHOUT STATUS MIGRAINOSUS: Primary | ICD-10-CM

## 2019-07-24 ASSESSMENT — PAIN SCALES - GENERAL: PAINLEVEL: SEVERE PAIN (6)

## 2019-07-24 ASSESSMENT — MIFFLIN-ST. JEOR: SCORE: 1390.45

## 2019-07-24 NOTE — LETTER
"7/24/2019       RE: Samara Oropeza  [address]  [address]  [place] [address]     Dear Colleague,    Thank you for referring your patient, Samara Oropeza, to the [facility] PHYSICAL MEDIC[facility]. Please see a copy of my visit note below.    BOTULINUM TOXIN PROCEDURE - HEADACHE - NOTE    Chief Complaint   Patient presents with     Headache     Dystonia     [facility] RETURN BOTOX- MIGRAINE/DYSTONIA     /84   Pulse 101   Temp 98  F (36.7  C) (Oral)   Resp 16   Ht 1.6 m (5' 3\")   Wt 67.1 kg (148 lb)   SpO2 100%   BMI 26.22 kg/m        Current Outpatient Medications:      albuterol (PROAIR HFA/PROVENTIL HFA/VENTOLIN HFA) 108 (90 BASE) MCG/ACT Inhaler, Inhale 2 puffs into the lungs every 6 hours as needed for shortness of breath / dyspnea or wheezing, Disp: 1 Inhaler, Rfl: 1     amitriptyline (ELAVIL) 25 MG tablet, Take 1 tablet (25 mg) by mouth At Bedtime, Disp: 90 tablet, Rfl: 1     artificial tears OINT ophthalmic ointment, 0.5 inch strip each eye at night, Disp: 1 Tube, Rfl: 11     aspirin (ASPIRIN CHILDRENS) 81 MG chewable tablet, Take 81 mg by mouth as needed , Disp: , Rfl:      benzocaine (TOPICALE XTRA) 20 % GEL, Apply as needed locally to mouth or nasal ulcers for pain; 4 times daily as needed, Disp: 30 g, Rfl: 1     bisacodyl (DULCOLAX) 5 MG EC tablet, Take 2 tablets (10 mg) by mouth daily as needed for constipation, Disp: 30 tablet, Rfl: 0     citalopram (CELEXA) 10 MG tablet, Take 1 tablet (10 mg) by mouth daily, Disp: 30 tablet, Rfl: 1     clobetasol (TEMOVATE) 0.05 % cream, Apply topically 2 times daily, Disp: 60 g, Rfl: 0     colchicine (COLCYRS) 0.6 MG tablet, Take 1 tablet (0.6 mg) by mouth 2 times daily, Disp: 180 tablet, Rfl: 1     cyproheptadine (PERIACTIN) 4 MG tablet, Take 2 tablets (8 mg) by mouth At Bedtime For nightmares, Disp: 180 tablet, Rfl: 2     diphenhydrAMINE (BENADRYL) 25 MG tablet, Take 1 tablet (25 mg) " by mouth 3 times daily as needed (itching), Disp: 40 tablet, Rfl: 1     gabapentin (NEURONTIN) 100 MG capsule, Take 1 capsule (100 mg) by mouth 3 times daily as needed (anxiety), Disp: 90 capsule, Rfl: 1     guanFACINE (TENEX) 1 MG tablet, Take 3 tablets (3 mg) by mouth twice daily in the morning and evening., Disp: 540 tablet, Rfl: 1     hydrocortisone 1 % CREA cream, Place rectally 2 times daily as needed for itching, Disp: 28.35 g, Rfl: 1     hyoscyamine (ANASPAZ/LEVSIN) 0.125 MG tablet, Take 1-2 tablets (125-250 mcg) by mouth every 4 hours as needed for cramping, Disp: 40 tablet, Rfl: 1     hypromellose (GENTEAL) 0.3 % SOLN, 1 drop every hour as needed for dry eyes , Disp: , Rfl:      lidocaine (LMX4) 4 % external cream, Apply topically once as needed for mild pain, Disp: , Rfl:      linaclotide (LINZESS) 290 MCG capsule, Take 1 capsule (290 mcg) by mouth every morning (before breakfast), Disp: 90 capsule, Rfl: 3     LORazepam (ATIVAN) 0.5 MG tablet, Take 1 tablet (0.5 mg) by mouth every 6 hours as needed for anxiety, Disp: 10 tablet, Rfl: 0     omeprazole (PRILOSEC) 40 MG capsule, Take 1 capsule (40 mg) by mouth daily, Disp: 90 capsule, Rfl: 3     ondansetron (ZOFRAN-ODT) 8 MG ODT tab, Take 1 tablet (8 mg) by mouth every 8 hours as needed for nausea, Disp: 180 tablet, Rfl: 1     order for DME, Equipment being ordered: Trilok brace 8 1/2 left lower extremity, Disp: 1 Device, Rfl: 0     order for DME, Roll-A-Bout Walker. Patient can use for 3 months, Disp: 1 Units, Rfl: 0     order for DME, Equipment being ordered: size 8 1/2 walking boot tall, Disp: 1 Device, Rfl: 0     order for DME, Equipment being ordered: RollAbout knee scooter, Disp: 1 Device, Rfl: 0     polyethylene glycol (MIRALAX/GLYCOLAX) powder, Take 1 capful by mouth 3 times daily, Disp: , Rfl:      sennosides (SENOKOT) 8.6 MG tablet, Take 3 tablets by mouth 2 times daily, Disp: 240 tablet, Rfl: 3     sodium chloride 0.9% SOLN BOLUS, Inject 1,000 mLs  into the vein daily as needed (IV abx and flushes), Disp: 1000 mL, Rfl: 11     SPRINTEC 28 0.25-35 MG-MCG tablet, Take one tablet by mouth daily. Take continuously., Disp: 84 tablet, Rfl: 3     sucralfate (CARAFATE) 1 GM/10ML suspension, Take 10 mLs (1 g) by mouth 4 times daily, Disp: 1200 mL, Rfl: 2     betamethasone valerate (VALISONE) 0.1 % cream, Apply topically 2 times daily as needed , Disp: , Rfl:      dicyclomine (BENTYL) 20 MG tablet, Take 1 tablet (20 mg) by mouth 4 times daily as needed (Patient not taking: Reported on 7/24/2019), Disp: 120 tablet, Rfl: 3     EPINEPHrine (EPIPEN 2-EAMON) 0.3 MG/0.3ML injection, Inject 0.3 mLs (0.3 mg) into the muscle as needed for anaphylaxis (Patient not taking: Reported on 7/24/2019), Disp: 0.6 mL, Rfl: 3     lactulose 20 GM/30ML SOLN, Take 30 mLs by mouth 3 times daily as needed (for constipation) (Patient not taking: Reported on 7/24/2019), Disp: 300 mL, Rfl: 3     promethazine (PHENERGAN) 12.5 MG tablet, Take 1-2 tablets (12.5-25 mg) by mouth every 6 hours as needed for nausea (Patient not taking: Reported on 7/24/2019), Disp: 30 tablet, Rfl: 3     Allergies   Allergen Reactions     Amoxil [Penicillins] Rash     Dad unsure of reaction.     Bee Venom Anaphylaxis     Contrast Dye Rash     Contrast Media Ready-Box [facility], 04/09/2014.; Contrast Media Ready-Box [facility], 04/09/2014.  NOTE: this is a contrast media oral with iodine. Premedicate with methylpred standard for IV contrast, request barium contrast for oral contrast.     Orange Fruit [Citrus] Anaphylaxis     Pineapple Anaphylaxis, Difficulty breathing and Rash     Reglan [Metoclopramide] Other (See Comments)     IV dose only, in ER, rapid heart rate.     Ace Inhibitors      Difficulty in breathing and GI upset     Amitiza [Lubiprostone] Nausea and Vomiting     Amoxicillin-Pot Clavulanate      Midazolam Unknown     parent states that when pt takes this medication, she wakes up being very violent .     No Clinical  Screening - See Comments      Bleech/ chest tightness, itchy throat, swollen tongue, hives     Tizanidine Other (See Comments)     Confusion, back pain, photophobia, abdominal pain, shaking, anxious       Versed      Coming out of pelvic exam at age of 5, was kicking and screaming when coming out of the versed.     Adhesive Tape Rash     Azithromycin Hives and Rash     Cephalexin Itching and Rash        PHYSICAL EXAM:    Pt reports Migraine Headache today rates 6/10, did not take medication today.  Headache started 2 days ago.    HPI:    Patient reports the following new medical problems since last visit: debridement of left ankle wound 6/4/2019.  She is scheduled for surgery of her ankle wound in 9/2019.     We reviewed the recommended safety guidelines for  Botox from any vaccine injection, such as the seasonal flu vaccine, by a minimum of 10-14 days with Samara Oropeza. She acknowledged understanding.    RESPONSE TO PREVIOUS TREATMENT:  Change in headache pattern following last series of injections with 175 units of  Botox on 5/1/2019.    Post-procedural headache: Rated as 'Mild' severity.  Duration: few days resolved    1.  Headache frequency during this injection cycle:  5 headache days per month.  This is compared to her baseline headache frequency of 30 headache days per month.     2.  Headache duration during this injection cycle:  Headache duration ranged from 1 day to 3 days.     3.  Headache intensity during this injection cycle:    A.  2-3/10  =  Typical pain level.  B.  7/10  =  Worst pain level.  C.  0/10  =  Lowest pain level.  Stated she has been excessively light sensitive.    4.  Change in headache medication usage during this injection cycle:  (For Example:  Able to decrease use of oral pain medications.) Doesn't use pain medication.    5.  ER Visits During This Injection Cycle:  0    6.  Functional Performance:  Change in ADL's, social interaction, days lost from work, etc. Patient  reports being able to more fully participate in social and family activities and responsibilities as headache symptoms have improved      BOTULINUM NEUROTOXIN INJECTION PROCEDURES:      VERIFICATION OF PATIENT IDENTIFICATION AND PROCEDURE     Initials   Patient Name lmd   Patient  lmd   Procedure Verified by: lmd     Prior to the start of the procedure and with procedural staff participation, I verbally confirmed the patient s identity using two indicators, relevant allergies, that the procedure was appropriate and matched the consent or emergent situation, and that the correct equipment/implants were available. Immediately prior to starting the procedure I conducted the Time Out with the procedural staff and re-confirmed the patient s name, procedure, and site/side. (The Joint Commission universal protocol was followed.)  Yes    Sedation (Moderate or Deep): None    Above assessments performed by:  Vidya Bryson RN Care Coordinator    Alejandrina Hernandez MD      INDICATIONS FOR PROCEDURES:  Samara Oropeza is a 23 year old patient with chronic migraine headaches associated with cervicogenic  components.     Her baseline symptoms have been recalcitrant to oral medications and conservative therapy.  She is here today for reinjection with Botox.    GOAL OF PROCEDURE:  The goal of this procedure is to decrease pain .    TOTAL DOSE ADMINISTERED:  Dose Administered:  185 units  Botox (Botulinum Toxin Type A)       2:1 Dilution   Diluent Used:  0.25 % bupivacaine (Batch: [id number], Exp: 2021, NDC: 55150-167-10)  Total Volume of Diluent Used:  4 ml  Lot # [id number] with Expiration Date:  2022  NDC #: Botox 100u ([address]-42)    Was there drug waste? Yes  Amount of drug waste (mL): 15 units Botox.  Reason for waste:  Single use vial  Multi-dose vial: No    Alejandrina Hernandez MD  2019     Medication guide was offered to patient and was declined.    CONSENT:  The risks, benefits, and treatment  options were discussed with Samara Oropeza and she agreed to proceed.    Written consent was obtained by [facility].     EQUIPMENT USED:  Needle-25mm stimulating/recording  Needle-30 gauge  EMG/NCS Machine    SKIN PREPARATION:  Skin preparation was performed using an alcohol wipe.    GUIDANCE DESCRIPTION:  Electro-myographic guidance was necessary throughout the neck portion of the procedure to accurately identify all areas of dystonic muscles while avoiding injection of non-dystonic muscles, neighboring nerves and nearby vascular structures.      AREA/MUSCLE INJECTED:  185 UNITS BOTOX = TOTAL DOSE     1 & 2. SHOULDER GIRDLE & NECK MUSCLES: 30 units Botox = Total Dose, 2:1 Dilution     Right Splenius - 10 units of Botox at 1 site/s.   Left Splenius - 10 units of Botox at 1 site/s.      Right Levator Scapulae - 5 units of Botox at 1 site/s (shoulder muscles).   Left Levator Scapulae - 5 units of Botox at 1 site/s (shoulder muscles).     3. HEAD & SCALP MUSCLES: 155 units Botox = Total Dose, 2:1 Dilution    Right Occipitalis - 10 units of Botox at 3 site/s.   Left Occipitalis - 10 units of Botox at 3 site/s.     Right Frontalis - 15 units of Botox at 4 site/s.  Left Frontalis - 15 units of Botox at 4 site/s.     Right Temporalis - 45 units of Botox at 8 site/s.  Left Temporalis - 45 units of Botox at 8 site/s.     Right  - 5 units of Botox at 1 site/s.              Left  - 5 units of Botox at 1 site/s.      Procerus - 5 units of Botox at 1 site/s.      RESPONSE TO PROCEDURE:  Samara Oropeza tolerated the procedure well and there were no immediate complications.   She was allowed to recover for an appropriate period of time and was discharged home in stable condition.    FOLLOW UP:  Samara Oropeza was asked to follow up by phone in 7-14 days with Marcelle Richardson PT, Care Coordinator or Vidya Dickinson RN, Care Coordinator, to report her response to this series of injections.  Based on  the patient's previous response to this therapy, Samara Oropeza was rescheduled for the next series of injections in 12 weeks.    PLAN (Medication Changes, Therapy Orders, Work or Disability Issues, etc.): Patient will continue to monitor response to today's injections.    Again, thank you for allowing me to participate in the care of your patient.      Sincerely,    Alejandrina Hernandez MD

## 2019-07-24 NOTE — PROGRESS NOTES
"BOTULINUM TOXIN PROCEDURE - HEADACHE - NOTE    Chief Complaint   Patient presents with     Headache     Dystonia     [facility] RETURN BOTOX- MIGRAINE/DYSTONIA     /84   Pulse 101   Temp 98  F (36.7  C) (Oral)   Resp 16   Ht 1.6 m (5' 3\")   Wt 67.1 kg (148 lb)   SpO2 100%   BMI 26.22 kg/m       Current Outpatient Medications:      albuterol (PROAIR HFA/PROVENTIL HFA/VENTOLIN HFA) 108 (90 BASE) MCG/ACT Inhaler, Inhale 2 puffs into the lungs every 6 hours as needed for shortness of breath / dyspnea or wheezing, Disp: 1 Inhaler, Rfl: 1     amitriptyline (ELAVIL) 25 MG tablet, Take 1 tablet (25 mg) by mouth At Bedtime, Disp: 90 tablet, Rfl: 1     artificial tears OINT ophthalmic ointment, 0.5 inch strip each eye at night, Disp: 1 Tube, Rfl: 11     aspirin (ASPIRIN CHILDRENS) 81 MG chewable tablet, Take 81 mg by mouth as needed , Disp: , Rfl:      benzocaine (TOPICALE XTRA) 20 % GEL, Apply as needed locally to mouth or nasal ulcers for pain; 4 times daily as needed, Disp: 30 g, Rfl: 1     bisacodyl (DULCOLAX) 5 MG EC tablet, Take 2 tablets (10 mg) by mouth daily as needed for constipation, Disp: 30 tablet, Rfl: 0     citalopram (CELEXA) 10 MG tablet, Take 1 tablet (10 mg) by mouth daily, Disp: 30 tablet, Rfl: 1     clobetasol (TEMOVATE) 0.05 % cream, Apply topically 2 times daily, Disp: 60 g, Rfl: 0     colchicine (COLCYRS) 0.6 MG tablet, Take 1 tablet (0.6 mg) by mouth 2 times daily, Disp: 180 tablet, Rfl: 1     cyproheptadine (PERIACTIN) 4 MG tablet, Take 2 tablets (8 mg) by mouth At Bedtime For nightmares, Disp: 180 tablet, Rfl: 2     diphenhydrAMINE (BENADRYL) 25 MG tablet, Take 1 tablet (25 mg) by mouth 3 times daily as needed (itching), Disp: 40 tablet, Rfl: 1     gabapentin (NEURONTIN) 100 MG capsule, Take 1 capsule (100 mg) by mouth 3 times daily as needed (anxiety), Disp: 90 capsule, Rfl: 1     guanFACINE (TENEX) 1 MG tablet, Take 3 tablets (3 mg) by mouth twice daily in the morning and evening., Disp: 540 " tablet, Rfl: 1     hydrocortisone 1 % CREA cream, Place rectally 2 times daily as needed for itching, Disp: 28.35 g, Rfl: 1     hyoscyamine (ANASPAZ/LEVSIN) 0.125 MG tablet, Take 1-2 tablets (125-250 mcg) by mouth every 4 hours as needed for cramping, Disp: 40 tablet, Rfl: 1     hypromellose (GENTEAL) 0.3 % SOLN, 1 drop every hour as needed for dry eyes , Disp: , Rfl:      lidocaine (LMX4) 4 % external cream, Apply topically once as needed for mild pain, Disp: , Rfl:      linaclotide (LINZESS) 290 MCG capsule, Take 1 capsule (290 mcg) by mouth every morning (before breakfast), Disp: 90 capsule, Rfl: 3     LORazepam (ATIVAN) 0.5 MG tablet, Take 1 tablet (0.5 mg) by mouth every 6 hours as needed for anxiety, Disp: 10 tablet, Rfl: 0     omeprazole (PRILOSEC) 40 MG capsule, Take 1 capsule (40 mg) by mouth daily, Disp: 90 capsule, Rfl: 3     ondansetron (ZOFRAN-ODT) 8 MG ODT tab, Take 1 tablet (8 mg) by mouth every 8 hours as needed for nausea, Disp: 180 tablet, Rfl: 1     order for DME, Equipment being ordered: Trilok brace 8 1/2 left lower extremity, Disp: 1 Device, Rfl: 0     order for DME, Roll-A-Bout Walker. Patient can use for 3 months, Disp: 1 Units, Rfl: 0     order for DME, Equipment being ordered: size 8 1/2 walking boot tall, Disp: 1 Device, Rfl: 0     order for DME, Equipment being ordered: RollAbout knee scooter, Disp: 1 Device, Rfl: 0     polyethylene glycol (MIRALAX/GLYCOLAX) powder, Take 1 capful by mouth 3 times daily, Disp: , Rfl:      sennosides (SENOKOT) 8.6 MG tablet, Take 3 tablets by mouth 2 times daily, Disp: 240 tablet, Rfl: 3     sodium chloride 0.9% SOLN BOLUS, Inject 1,000 mLs into the vein daily as needed (IV abx and flushes), Disp: 1000 mL, Rfl: 11     SPRINTEC 28 0.25-35 MG-MCG tablet, Take one tablet by mouth daily. Take continuously., Disp: 84 tablet, Rfl: 3     sucralfate (CARAFATE) 1 GM/10ML suspension, Take 10 mLs (1 g) by mouth 4 times daily, Disp: 1200 mL, Rfl: 2     betamethasone  valerate (VALISONE) 0.1 % cream, Apply topically 2 times daily as needed , Disp: , Rfl:      dicyclomine (BENTYL) 20 MG tablet, Take 1 tablet (20 mg) by mouth 4 times daily as needed (Patient not taking: Reported on 7/24/2019), Disp: 120 tablet, Rfl: 3     EPINEPHrine (EPIPEN 2-EAMON) 0.3 MG/0.3ML injection, Inject 0.3 mLs (0.3 mg) into the muscle as needed for anaphylaxis (Patient not taking: Reported on 7/24/2019), Disp: 0.6 mL, Rfl: 3     lactulose 20 GM/30ML SOLN, Take 30 mLs by mouth 3 times daily as needed (for constipation) (Patient not taking: Reported on 7/24/2019), Disp: 300 mL, Rfl: 3     promethazine (PHENERGAN) 12.5 MG tablet, Take 1-2 tablets (12.5-25 mg) by mouth every 6 hours as needed for nausea (Patient not taking: Reported on 7/24/2019), Disp: 30 tablet, Rfl: 3     Allergies   Allergen Reactions     Amoxil [Penicillins] Rash     Dad unsure of reaction.     Bee Venom Anaphylaxis     Contrast Dye Rash     Contrast Media Ready-Box [facility], 04/09/2014.; Contrast Media Ready-Box [facility], 04/09/2014.  NOTE: this is a contrast media oral with iodine. Premedicate with methylpred standard for IV contrast, request barium contrast for oral contrast.     Orange Fruit [Citrus] Anaphylaxis     Pineapple Anaphylaxis, Difficulty breathing and Rash     Reglan [Metoclopramide] Other (See Comments)     IV dose only, in ER, rapid heart rate.     Ace Inhibitors      Difficulty in breathing and GI upset     Amitiza [Lubiprostone] Nausea and Vomiting     Amoxicillin-Pot Clavulanate      Midazolam Unknown     parent states that when pt takes this medication, she wakes up being very violent .     No Clinical Screening - See Comments      Bleech/ chest tightness, itchy throat, swollen tongue, hives     Tizanidine Other (See Comments)     Confusion, back pain, photophobia, abdominal pain, shaking, anxious       Versed      Coming out of pelvic exam at age of 5, was kicking and screaming when coming out of the versed.     Adhesive  Tape Rash     Azithromycin Hives and Rash     Cephalexin Itching and Rash        PHYSICAL EXAM:    Pt reports Migraine Headache today rates 6/10, did not take medication today.  Headache started 2 days ago.    HPI:    Patient reports the following new medical problems since last visit: debridement of left ankle wound 2019.  She is scheduled for surgery of her ankle wound in 2019.     We reviewed the recommended safety guidelines for  Botox from any vaccine injection, such as the seasonal flu vaccine, by a minimum of 10-14 days with Samara Oropeza. She acknowledged understanding.    RESPONSE TO PREVIOUS TREATMENT:  Change in headache pattern following last series of injections with 175 units of  Botox on 2019.    Post-procedural headache: Rated as 'Mild' severity.  Duration: few days resolved    1.  Headache frequency during this injection cycle:  5 headache days per month.  This is compared to her baseline headache frequency of 30 headache days per month.     2.  Headache duration during this injection cycle:  Headache duration ranged from 1 day to 3 days.     3.  Headache intensity during this injection cycle:    A.  2-3/10  =  Typical pain level.  B.  7/10  =  Worst pain level.  C.  0/10  =  Lowest pain level.  Stated she has been excessively light sensitive.    4.  Change in headache medication usage during this injection cycle:  (For Example:  Able to decrease use of oral pain medications.) Doesn't use pain medication.    5.  ER Visits During This Injection Cycle:  0    6.  Functional Performance:  Change in ADL's, social interaction, days lost from work, etc. Patient reports being able to more fully participate in social and family activities and responsibilities as headache symptoms have improved      BOTULINUM NEUROTOXIN INJECTION PROCEDURES:      VERIFICATION OF PATIENT IDENTIFICATION AND PROCEDURE     Initials   Patient Name lmd   Patient  lmd   Procedure Verified by: lmd      Prior to the start of the procedure and with procedural staff participation, I verbally confirmed the patient s identity using two indicators, relevant allergies, that the procedure was appropriate and matched the consent or emergent situation, and that the correct equipment/implants were available. Immediately prior to starting the procedure I conducted the Time Out with the procedural staff and re-confirmed the patient s name, procedure, and site/side. (The Joint Commission universal protocol was followed.)  Yes    Sedation (Moderate or Deep): None    Above assessments performed by:  Vidya Bryson RN Care Coordinator    Alejandrina Hernandez MD      INDICATIONS FOR PROCEDURES:  Samara Oropeza is a 23 year old patient with chronic migraine headaches associated with cervicogenic  components.     Her baseline symptoms have been recalcitrant to oral medications and conservative therapy.  She is here today for reinjection with Botox.    GOAL OF PROCEDURE:  The goal of this procedure is to decrease pain .    TOTAL DOSE ADMINISTERED:  Dose Administered:  185 units  Botox (Botulinum Toxin Type A)       2:1 Dilution   Diluent Used:  0.25 % bupivacaine (Batch: [id number], Exp: 11/2021, NDC: 55150-167-10)  Total Volume of Diluent Used:  4 ml  Lot # [id number] with Expiration Date:  01/2022  NDC #: Botox 100u ([address]-34)    Was there drug waste? Yes  Amount of drug waste (mL): 15 units Botox.  Reason for waste:  Single use vial  Multi-dose vial: No    Alejandrina Hernandez MD  July 26, 2019     Medication guide was offered to patient and was declined.    CONSENT:  The risks, benefits, and treatment options were discussed with Samara Oropeza and she agreed to proceed.    Written consent was obtained by [facility].     EQUIPMENT USED:  Needle-25mm stimulating/recording  Needle-30 gauge  EMG/NCS Machine    SKIN PREPARATION:  Skin preparation was performed using an alcohol wipe.    GUIDANCE DESCRIPTION:  Electro-myographic  guidance was necessary throughout the neck portion of the procedure to accurately identify all areas of dystonic muscles while avoiding injection of non-dystonic muscles, neighboring nerves and nearby vascular structures.      AREA/MUSCLE INJECTED:  185 UNITS BOTOX = TOTAL DOSE     1 & 2. SHOULDER GIRDLE & NECK MUSCLES: 30 units Botox = Total Dose, 2:1 Dilution     Right Splenius - 10 units of Botox at 1 site/s.   Left Splenius - 10 units of Botox at 1 site/s.      Right Levator Scapulae - 5 units of Botox at 1 site/s (shoulder muscles).   Left Levator Scapulae - 5 units of Botox at 1 site/s (shoulder muscles).     3. HEAD & SCALP MUSCLES: 155 units Botox = Total Dose, 2:1 Dilution    Right Occipitalis - 10 units of Botox at 3 site/s.   Left Occipitalis - 10 units of Botox at 3 site/s.     Right Frontalis - 15 units of Botox at 4 site/s.  Left Frontalis - 15 units of Botox at 4 site/s.     Right Temporalis - 45 units of Botox at 8 site/s.  Left Temporalis - 45 units of Botox at 8 site/s.     Right  - 5 units of Botox at 1 site/s.              Left  - 5 units of Botox at 1 site/s.      Procerus - 5 units of Botox at 1 site/s.      RESPONSE TO PROCEDURE:  Samara Oropeza tolerated the procedure well and there were no immediate complications.   She was allowed to recover for an appropriate period of time and was discharged home in stable condition.    FOLLOW UP:  Samara Oropeza was asked to follow up by phone in 7-14 days with Marcelle Richardson PT, Care Coordinator or Vidya Dickinson RN, Care Coordinator, to report her response to this series of injections.  Based on the patient's previous response to this therapy, Samara Oropeza was rescheduled for the next series of injections in 12 weeks.    PLAN (Medication Changes, Therapy Orders, Work or Disability Issues, etc.): Patient will continue to monitor response to today's injections.

## 2019-07-24 NOTE — NURSING NOTE
Chief Complaint   Patient presents with     Headache     Dystonia     [facility] RETURN BOTOX- MIGRAINE/DYSTONIA       hSaquille Redman, EMT

## 2019-07-25 ENCOUNTER — OFFICE VISIT (OUTPATIENT)
Dept: FAMILY MEDICINE | Facility: CLINIC | Age: 24
End: 2019-07-25
Payer: COMMERCIAL

## 2019-07-25 ENCOUNTER — MYC MEDICAL ADVICE (OUTPATIENT)
Dept: FAMILY MEDICINE | Facility: CLINIC | Age: 24
End: 2019-07-25

## 2019-07-25 VITALS
OXYGEN SATURATION: 99 % | SYSTOLIC BLOOD PRESSURE: 110 MMHG | DIASTOLIC BLOOD PRESSURE: 80 MMHG | RESPIRATION RATE: 14 BRPM | TEMPERATURE: 97.6 F | HEART RATE: 101 BPM

## 2019-07-25 DIAGNOSIS — M35.2 BEHCET RECURRENT DISEASE (H): Primary | ICD-10-CM

## 2019-07-25 DIAGNOSIS — K62.9 ANAL LESION: Primary | ICD-10-CM

## 2019-07-25 PROCEDURE — 99213 OFFICE O/P EST LOW 20 MIN: CPT | Performed by: NURSE PRACTITIONER

## 2019-07-25 RX ORDER — LORAZEPAM 0.5 MG/1
0.5 TABLET ORAL EVERY 6 HOURS PRN
Qty: 10 TABLET | Refills: 0 | Status: SHIPPED | OUTPATIENT
Start: 2019-07-25 | End: 2019-09-17

## 2019-07-25 RX ORDER — TRIAMCINOLONE ACETONIDE 5 MG/G
1 OINTMENT TOPICAL 3 TIMES DAILY PRN
Qty: 15 G | Refills: 3 | Status: ON HOLD | OUTPATIENT
Start: 2019-07-25 | End: 2023-02-12

## 2019-07-25 RX ORDER — LIDOCAINE 40 MG/G
CREAM TOPICAL
Qty: 120 G | Refills: 1 | Status: SHIPPED | OUTPATIENT
Start: 2019-07-25

## 2019-07-25 RX ORDER — PREDNISONE 20 MG/1
40 TABLET ORAL DAILY
Qty: 10 TABLET | Refills: 1 | Status: SHIPPED | OUTPATIENT
Start: 2019-07-25 | End: 2019-09-17

## 2019-07-25 NOTE — PROGRESS NOTES
Subjective     Samara Oropeza is a 23 year old female who presents to clinic today for the following health issues:    HPI   Genital lesions      Duration: 07/23/19    Description (location/character/radiation): lesions in genital area    Intensity:  Severe, 8/10 pain    Accompanying signs and symptoms: Having trouble urinating because of pain    History (similar episodes/previous evaluation): part of the disease she has    Precipitating or alleviating factors: constant    Therapies tried and outcome: lidocaine, witch hazel wipes       Therapies Tried and outcome: None    -------------------------------------    Patient Active Problem List   Diagnosis     Tics - Tourette syndrome     IBS (irritable bowel syndrome)     Allergic rhinitis     Generalized anxiety disorder     Knee pain     Concussion     Milk protein intolerance     Headaches due to old head injury     Behcet's disease (H)     Migraine     Spell of shaking     On colchicine therapy     Palpitations     Fibromyalgia     Rheumatoid arthritis of multiple sites without rheumatoid factor (H)     Raynaud's disease without gangrene     Chronic abdominal pain     Patellofemoral instability     PTSD (post-traumatic stress disorder)     Cervical dystonia     Acute left ankle pain     Cervical pain     Major depressive disorder, recurrent episode, moderate (H)     Chronic pain syndrome     Convulsions, unspecified convulsion type (H)     Transient alteration of awareness     Vitamin D deficiency     Mobile cecum     Spells of decreased attentiveness     Pelvic floor weakness     Somatic symptom disorder     Aphthous ulcer     Gastroparesis     GERD (gastroesophageal reflux disease)     Displacement of lumbar intervertebral disc without myelopathy     Intestinal malabsorption     Iron deficiency associated with nonfamilial restless legs syndrome     Enthesopathy of hip region     Tourette's syndrome     Cellulitis     Infection of joint of ankle (H)     Past  Surgical History:   Procedure Laterality Date     ARTHROSCOPY ANKLE, REPAIR LIGAMENT Left 1/2/2019    Procedure: Ankle arthroscopy and sinus tarsi evacuation, ligament repair, left lower extremity;  Surgeon: Andres Johnson DPM;  Location: [facility]     ARTHROSCOPY KNEE WITH PATELLAR REALIGNMENT  7/25/2013    Procedure: ARTHROSCOPY KNEE WITH PATELLAR REALIGNMENT;  Left Knee Arthroscopy, Medial Patellofemoral Ligament Reconstruction with Allograft  ;  Surgeon: Jennifer Acevedo MD;  Location: [facility]     COLONOSCOPY  2015     DENTAL SURGERY  1996    Teeth removal     ENDOSCOPY UPPER, COLONOSCOPY, COMBINED  2005     HC ESOPH/GAS REFLUX TEST W NASAL IMPED >1 HR N/A 2/15/2017    Procedure: ESOPHAGEAL IMPEDENCE FUNCTION TEST WITH 24 HOUR PH GREATER THAN 1 HOUR;  Surgeon: Timothy Matta MD;  Location: [facility]     IR PICC PLACEMENT > 5 YRS OF AGE  3/13/2019     IRRIGATION AND DEBRIDEMENT FOOT, COMBINED Left 3/12/2019    Procedure: COMBINED IRRIGATION AND DEBRIDEMENT LEFT ANKLE;  Surgeon: Micha Glover MD;  Location: [facility]     IRRIGATION AND DEBRIDEMENT LOWER EXTREMITY, COMBINED Left 5/7/2019    Procedure: 1.  Excision of wound down to and including deep fascia, less than 20 cm2.  2.  Irrigation and debridement, left ankle.;  Surgeon: Andres Johnson DPM;  Location: [facility]     PICC INSERTION Left 05/05/2019    4Fr - 45cm (5cm external), Basilic vein, SVC RA junction       Social History     Tobacco Use     Smoking status: Never Smoker     Smokeless tobacco: Never Used   Substance Use Topics     Alcohol use: Yes     Alcohol/week: 0.6 oz     Types: 1 Standard drinks or equivalent per week     Comment: rarely     Family History   Problem Relation Age of Onset     Depression Mother      Neurologic Disorder Mother         Migraines, take imitrex injection.  Also in maternal grandmother.       Alcohol/Drug Father      Hypertension Father      Depression Father      Osteoarthritis Father       Cardiovascular Maternal Grandmother      Depression Maternal Grandmother      Hypertension Maternal Grandmother      Alzheimer Disease Maternal Grandmother      Cardiovascular Maternal Grandfather      Hypertension Maternal Grandfather      Depression Maternal Grandfather      Alcohol/Drug Maternal Grandfather      Cardiovascular Paternal Grandmother      Hypertension Paternal Grandmother      Cardiovascular Paternal Grandfather      Hypertension Paternal Grandfather      Glaucoma No family hx of      Macular Degeneration No family hx of            -------------------------------------  Reviewed and updated as needed this visit by Provider         Review of Systems   ROS COMP: Constitutional, HEENT, cardiovascular, pulmonary, gi and gu systems are negative, except as otherwise noted.      Objective    /80   Pulse 101   Temp 97.6  F (36.4  C) (Oral)   Resp 14   LMP 07/11/2019 (Approximate)   SpO2 99%   There is no height or weight on file to calculate BMI.  Physical Exam   GENERAL: healthy, alert and no distress  NECK: no adenopathy, no asymmetry, masses, or scars and thyroid normal to palpation  RESP: lungs clear to auscultation - no rales, rhonchi or wheezes  CV: regular rate and rhythm, normal S1 S2, no S3 or S4, no murmur, click or rub, no peripheral edema and peripheral pulses strong  ABDOMEN: soft, nontender, no hepatosplenomegaly, no masses and bowel sounds normal   (female): normal urethral meatus  and vaginal mucosa erytheamtous, ulcerations present    Diagnostic Test Results:  Labs reviewed in Epic  No results found. However, due to the size of the patient record, not all encounters were searched. Please check Results Review for a complete set of results.        Assessment & Plan   Problem List Items Addressed This Visit     None      Visit Diagnoses     Behcet recurrent disease (H)    -  Primary    Relevant Medications    triamcinolone (KENALOG) 0.5 % external ointment    TAHIR (generalized  "anxiety disorder)        Relevant Medications    LORazepam (ATIVAN) 0.5 MG tablet             BMI:   Estimated body mass index is 26.22 kg/m  as calculated from the following:    Height as of 7/24/19: 1.6 m (5' 3\").    Weight as of 7/24/19: 67.1 kg (148 lb).           See Patient Instructions  No follow-ups on file.    EVI Cardona CNP  [facility] [place]      "

## 2019-07-25 NOTE — TELEPHONE ENCOUNTER
Called patient, relayed provider message below, patient verbalized understanding. Appointment scheduled 07/25/2019 at 4:15 per provider message below    Ksenia Medina RN  Triage Nurse

## 2019-07-25 NOTE — TELEPHONE ENCOUNTER
Yes, you can put her on my schedule sometime this afternoon, so we can make sure there is no secondary infection.

## 2019-07-25 NOTE — TELEPHONE ENCOUNTER
Sonja Abreu CNP;   Please see patient's MyChart message    Would you like office visit or E-Visit to assess symptoms?    Please advise    Thank You!  Ksenia Medina, JESSY  Triage Nurse

## 2019-07-25 NOTE — TELEPHONE ENCOUNTER
Sonja Abreu CNP,  Patient is requesting appointment with you today. Per patient, she tried to make appointment with GYN and here but there are no openings. Patient states lesions are very bad and very painful    She states she does not want to go to UC as they are not familiar with her and you are    Please advise    Thank You!  Ksenia Medina, JESSY  Triage Nurse

## 2019-07-25 NOTE — TELEPHONE ENCOUNTER
Sonja Abreu CNP,  Please see patient's MyChart message    Are you familiar with lesions patient mentions? Would you like office visit to discuss symptoms?    Please advise    Thank You!  Ksenia Medina RN  Triage Nurse

## 2019-07-26 RX ORDER — BUPIVACAINE HYDROCHLORIDE 2.5 MG/ML
5 INJECTION, SOLUTION EPIDURAL; INFILTRATION; INTRACAUDAL ONCE
Status: COMPLETED | OUTPATIENT
Start: 2019-07-26 | End: 2019-07-26

## 2019-07-26 RX ADMIN — BUPIVACAINE HYDROCHLORIDE 12.5 MG: 2.5 INJECTION, SOLUTION EPIDURAL; INFILTRATION; INTRACAUDAL at 09:07

## 2019-08-06 ENCOUNTER — HOSPITAL ENCOUNTER (OUTPATIENT)
Dept: GENERAL RADIOLOGY | Facility: CLINIC | Age: 24
Discharge: HOME | End: 2019-08-06
Attending: PHYSICIAN ASSISTANT | Admitting: PHYSICIAN ASSISTANT
Payer: COMMERCIAL

## 2019-08-06 ENCOUNTER — HOSPITAL ENCOUNTER (OUTPATIENT)
Dept: SPEECH THERAPY | Facility: CLINIC | Age: 24
Setting detail: THERAPIES SERIES
End: 2019-08-06
Attending: PHYSICIAN ASSISTANT
Payer: COMMERCIAL

## 2019-08-06 DIAGNOSIS — R13.10 DYSPHAGIA, UNSPECIFIED TYPE: ICD-10-CM

## 2019-08-06 DIAGNOSIS — R07.0 THROAT PAIN: ICD-10-CM

## 2019-08-06 PROCEDURE — 92611 MOTION FLUOROSCOPY/SWALLOW: CPT | Mod: GN | Performed by: SPEECH-LANGUAGE PATHOLOGIST

## 2019-08-06 PROCEDURE — 25500045 ZZH RX 255: Performed by: RADIOLOGY

## 2019-08-06 PROCEDURE — 74230 X-RAY XM SWLNG FUNCJ C+: CPT

## 2019-08-06 RX ORDER — BARIUM SULFATE 400 MG/ML
SUSPENSION ORAL ONCE
Status: DISCONTINUED | OUTPATIENT
Start: 2019-08-06 | End: 2019-08-06 | Stop reason: CLARIF

## 2019-08-06 RX ADMIN — ANTACID/ANTIFLATULENT 4 G: 380; 550; 10; 10 GRANULE, EFFERVESCENT ORAL at 11:03

## 2019-08-06 NOTE — PROGRESS NOTES
"   08/06/19 1147   General Information   Type Of Visit Initial   Start Of Care Date 08/06/19   Referring Physician Laurie Gómez PA-C   Orders   (XR Video Swallow w Esophagram)   Medical Diagnosis Dysphagia, unspecified type R13.10; Throat pain R07.0   Pertinent History of Current Problem/OT: Additional Occupational Profile Info Per MD \"Patient states that she is having moderate to severe pain in her chest and throat when she swallows food, including liquid, dull ache but feels tight when she is trying to eat. I asked if she had hx of heart burn and she does but states that this is totally different. Complicated pt with history of multiple chronic GI problems.  Unsure of cause of pain, visual inspection of oropharynx was normal.  She did have some tenderness to palpation of the anterior and bilateral lateral neck as well as the sternum.  She had a normal lung and heart exam but did complain of palpitations.  As she is complaining of swallowing difficulties, including when drinking water, I will get a swallowing study for further evaluation. If results are normal, will have pt follow up with GI for further evaluation and possible endoscopy.\" Per Rheumatology, \"Behcet's syndrome with periodic painful oral/genital (scarring) ulcers in association with menstruation diagnosed end of 2014; Folliculitis; Positive Pathergy test - stable on colchicine. She continues to have GI symptoms  Colonoscopy and endoscopy negative 2018.  Has gastroparesis.  Behcets may have GI involvement but no evidence of GI inflammation at this time. Dr. Baker has evaluated her. Dr. Rollins did the endoscopies. Her abdominal pain is not related to otezla/colchicine as her abdominal symptoms were present even before they were started. This was verified with the patient.  Patient seeing [facility] currently. [facility] suggested 6 week therapy in [place] for pelvic floor muscles. Patient is not able to afford that and thinking of alternative " "options.\" No history of SLP services or s/sx of aspiration. Primary concern with worsening pain in throat and chest when eating and drinking. Pt reported limited intake due to constipation, but mostly drinking warm tea.    Patient/family Goals improve pain with swallow   VFSS Eval: Thin Liquid Texture Trial   Mode of Presentation, Thin Liquid cup   Preparatory Phase WFL   Oral Phase, Thin Liquid WFL   Pharyngeal Phase, Thin Liquid WFL   Rosenbek's Penetration Aspiration Scale: Thin Liquid Trial Results 1 - no aspiration, contrast does not enter airway   Diagnostic Statement [facility]   VFSS Eval: Puree Solid Texture Trial   Mode of Presentation, Puree spoon   Preparatory Phase WFL   Oral Phase, Puree WFL   Pharyngeal Phase, Puree WFL   Rosenbek's Penetration Aspiration Scale: Puree Food Trial Results 1 - no aspiration, contrast does not enter airway   Diagnostic Statement [facility]   VFSS Eval: Solid Food Texture Trial   Mode of Presentation, Solid self-fed   Preparatory Phase WFL   Oral Phase, Solid WFL   Pharyngeal Phase, Solid WFL   Rosenbek's Penetration Aspiration Scale: Solid Food Trial Results 1 - no aspiration, contrast does not enter airway   Diagnostic Statement [facility]   Educational Assessment   Barriers to Learning No barriers   Esophageal Phase of Swallow   Patient reports or presents with symptoms of esophageal dysphagia Yes   Esophageal sweep performed during today s vidofluoroscopic exam  Please refer to radiologist's report for details   Swallow Eval: Clinical Impressions   Skilled Criteria for Therapy Intervention No problems identified which require skilled intervention   Functional Assessment Scale (FAS) 7   Dysphagia Outcome Severity Scale (LOUIS) Level 7 - LOUIS   Treatment Diagnosis Normal oral and pharyngeal phase of swallow   Diet texture recommendations Regular diet;Thin liquids   Demonstrates Need for Referral to Another Service   (GI; ENT)   Anticipated Discharge Disposition home   Risks and Benefits of " "Treatment have been explained. Yes   Patient, family and/or staff in agreement with Plan of Care Yes   Clinical Impression Comments This 23 year old female was referred for a Video Swallow Study due to, \"Pain in chest and throat, difficulty and pain swallowing.\" Pt presents with a functional swallow on Video Swallow Study. Oral and pharyngeal phase are considered within normal limits with trials of thin liquids, puree solids, and regular solids. Pt did report chest discomfort after the VFSS. Please refer to Radiology report for Esophagram results. Reviewed images with pt and pt recalled recommended EGD follow up. Pt already following bland diet with mostly liquids. No further SLP services indicated at this time. Would refer to GI for chest pain. If throat pain returns, could consider ENT consult.    Total Session Time   SLP Eval: VideoFluoroscopic Swallow function Minutes (14347) 15   Total Evaluation Time 15     "

## 2019-08-07 ENCOUNTER — OFFICE VISIT (OUTPATIENT)
Dept: PSYCHOLOGY | Facility: CLINIC | Age: 24
End: 2019-08-07
Payer: COMMERCIAL

## 2019-08-07 DIAGNOSIS — F41.1 GAD (GENERALIZED ANXIETY DISORDER): ICD-10-CM

## 2019-08-07 DIAGNOSIS — F33.1 MAJOR DEPRESSIVE DISORDER, RECURRENT EPISODE, MODERATE (H): Primary | ICD-10-CM

## 2019-08-07 PROCEDURE — 90834 PSYTX W PT 45 MINUTES: CPT | Performed by: COUNSELOR

## 2019-08-07 ASSESSMENT — ANXIETY QUESTIONNAIRES
5. BEING SO RESTLESS THAT IT IS HARD TO SIT STILL: SEVERAL DAYS
7. FEELING AFRAID AS IF SOMETHING AWFUL MIGHT HAPPEN: NOT AT ALL
6. BECOMING EASILY ANNOYED OR IRRITABLE: SEVERAL DAYS
2. NOT BEING ABLE TO STOP OR CONTROL WORRYING: SEVERAL DAYS
1. FEELING NERVOUS, ANXIOUS, OR ON EDGE: SEVERAL DAYS
3. WORRYING TOO MUCH ABOUT DIFFERENT THINGS: SEVERAL DAYS
GAD7 TOTAL SCORE: 7
IF YOU CHECKED OFF ANY PROBLEMS ON THIS QUESTIONNAIRE, HOW DIFFICULT HAVE THESE PROBLEMS MADE IT FOR YOU TO DO YOUR WORK, TAKE CARE OF THINGS AT HOME, OR GET ALONG WITH OTHER PEOPLE: SOMEWHAT DIFFICULT
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
## 2019-08-08 ASSESSMENT — ANXIETY QUESTIONNAIRES: GAD7 TOTAL SCORE: 7

## 2019-08-08 NOTE — RESULT ENCOUNTER NOTE
Phu Pascual,    I just wanted to let you know that your lab results have been reviewed and are attached.    - Your swallowing study was normal.    Please let me know if you have any questions and have a great week!    Sincerely,    Anjelica Gómez PA-C    [facility]  [address]  [place] [address]  [phone] (p)

## 2019-08-15 DIAGNOSIS — F41.1 GAD (GENERALIZED ANXIETY DISORDER): ICD-10-CM

## 2019-08-15 RX ORDER — GABAPENTIN 100 MG/1
100 CAPSULE ORAL 3 TIMES DAILY PRN
Qty: 90 CAPSULE | Refills: 1 | Status: SHIPPED | OUTPATIENT
Start: 2019-08-15 | End: 2019-10-25

## 2019-08-15 NOTE — TELEPHONE ENCOUNTER
Sonja/Provider Pool;  Routing refill request to provider for review/approval because:  Drug not on the [facility] refill protocol     LOV: 06/11/2019    : last dispensed 07/09/2019 #90/30 day supply prescribed by Sonja Abreu CNP     Thank You!  Ksenia Medina, RN  Triage Nurse

## 2019-08-21 ENCOUNTER — OFFICE VISIT (OUTPATIENT)
Dept: PSYCHOLOGY | Facility: CLINIC | Age: 24
End: 2019-08-21
Payer: COMMERCIAL

## 2019-08-21 DIAGNOSIS — F32.A DEPRESSION: ICD-10-CM

## 2019-08-21 DIAGNOSIS — F41.1 GAD (GENERALIZED ANXIETY DISORDER): Primary | ICD-10-CM

## 2019-08-21 PROCEDURE — 90791 PSYCH DIAGNOSTIC EVALUATION: CPT | Performed by: COUNSELOR

## 2019-08-21 ASSESSMENT — ANXIETY QUESTIONNAIRES
IF YOU CHECKED OFF ANY PROBLEMS ON THIS QUESTIONNAIRE, HOW DIFFICULT HAVE THESE PROBLEMS MADE IT FOR YOU TO DO YOUR WORK, TAKE CARE OF THINGS AT HOME, OR GET ALONG WITH OTHER PEOPLE: SOMEWHAT DIFFICULT
5. BEING SO RESTLESS THAT IT IS HARD TO SIT STILL: MORE THAN HALF THE DAYS
2. NOT BEING ABLE TO STOP OR CONTROL WORRYING: NOT AT ALL
3. WORRYING TOO MUCH ABOUT DIFFERENT THINGS: SEVERAL DAYS
GAD7 TOTAL SCORE: 8
6. BECOMING EASILY ANNOYED OR IRRITABLE: SEVERAL DAYS
1. FEELING NERVOUS, ANXIOUS, OR ON EDGE: MORE THAN HALF THE DAYS
7. FEELING AFRAID AS IF SOMETHING AWFUL MIGHT HAPPEN: NOT AT ALL
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
## 2019-08-22 ASSESSMENT — ANXIETY QUESTIONNAIRES: GAD7 TOTAL SCORE: 8

## 2019-09-03 ENCOUNTER — MYC MEDICAL ADVICE (OUTPATIENT)
Dept: PODIATRY | Facility: CLINIC | Age: 24
End: 2019-09-03

## 2019-09-04 ENCOUNTER — OFFICE VISIT (OUTPATIENT)
Dept: PSYCHOLOGY | Facility: CLINIC | Age: 24
End: 2019-09-04
Payer: COMMERCIAL

## 2019-09-04 DIAGNOSIS — F32.A DEPRESSION: ICD-10-CM

## 2019-09-04 DIAGNOSIS — F41.1 GAD (GENERALIZED ANXIETY DISORDER): Primary | ICD-10-CM

## 2019-09-04 PROCEDURE — 90834 PSYTX W PT 45 MINUTES: CPT | Performed by: COUNSELOR

## 2019-09-04 ASSESSMENT — ANXIETY QUESTIONNAIRES
7. FEELING AFRAID AS IF SOMETHING AWFUL MIGHT HAPPEN: SEVERAL DAYS
3. WORRYING TOO MUCH ABOUT DIFFERENT THINGS: SEVERAL DAYS
GAD7 TOTAL SCORE: 9
1. FEELING NERVOUS, ANXIOUS, OR ON EDGE: SEVERAL DAYS
5. BEING SO RESTLESS THAT IT IS HARD TO SIT STILL: SEVERAL DAYS
6. BECOMING EASILY ANNOYED OR IRRITABLE: SEVERAL DAYS
2. NOT BEING ABLE TO STOP OR CONTROL WORRYING: SEVERAL DAYS
IF YOU CHECKED OFF ANY PROBLEMS ON THIS QUESTIONNAIRE, HOW DIFFICULT HAVE THESE PROBLEMS MADE IT FOR YOU TO DO YOUR WORK, TAKE CARE OF THINGS AT HOME, OR GET ALONG WITH OTHER PEOPLE: SOMEWHAT DIFFICULT

## 2019-09-04 ASSESSMENT — PATIENT HEALTH QUESTIONNAIRE - PHQ9: 5. POOR APPETITE OR OVEREATING: NEARLY EVERY DAY
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
## 2019-09-05 ENCOUNTER — OFFICE VISIT (OUTPATIENT)
Dept: PODIATRY | Facility: CLINIC | Age: 24
End: 2019-09-05
Payer: COMMERCIAL

## 2019-09-05 ENCOUNTER — ANCILLARY PROCEDURE (OUTPATIENT)
Dept: GENERAL RADIOLOGY | Facility: CLINIC | Age: 24
End: 2019-09-05
Attending: PODIATRIST
Payer: COMMERCIAL

## 2019-09-05 VITALS
DIASTOLIC BLOOD PRESSURE: 78 MMHG | WEIGHT: 149 LBS | HEIGHT: 63 IN | SYSTOLIC BLOOD PRESSURE: 116 MMHG | BODY MASS INDEX: 26.4 KG/M2

## 2019-09-05 DIAGNOSIS — M25.572 CHRONIC PAIN OF LEFT ANKLE: ICD-10-CM

## 2019-09-05 DIAGNOSIS — M25.372 LEFT ANKLE INSTABILITY: ICD-10-CM

## 2019-09-05 DIAGNOSIS — M25.572 CHRONIC PAIN OF LEFT ANKLE: Primary | ICD-10-CM

## 2019-09-05 DIAGNOSIS — G89.29 CHRONIC PAIN OF LEFT ANKLE: Primary | ICD-10-CM

## 2019-09-05 DIAGNOSIS — G89.29 CHRONIC PAIN OF LEFT ANKLE: ICD-10-CM

## 2019-09-05 PROCEDURE — 73610 X-RAY EXAM OF ANKLE: CPT | Mod: LT

## 2019-09-05 PROCEDURE — 99214 OFFICE O/P EST MOD 30 MIN: CPT | Performed by: PODIATRIST

## 2019-09-05 ASSESSMENT — PATIENT HEALTH QUESTIONNAIRE - PHQ9: SUM OF ALL RESPONSES TO PHQ QUESTIONS 1-9: 7

## 2019-09-05 ASSESSMENT — MIFFLIN-ST. JEOR: SCORE: 1394.99

## 2019-09-05 ASSESSMENT — ANXIETY QUESTIONNAIRES: GAD7 TOTAL SCORE: 9

## 2019-09-05 NOTE — PROGRESS NOTES
"Foot & Ankle Surgery   September 5, 2019    S:  Pt is seen today for evaluation of new left ankle injury. WB in ankle brace, noticed some swelling x 2 weeks and then sustained an inversion injury Monday.  Scheduled for ankle scope and revision lateral ankle stabilization 9/25/19.    Vitals:    09/05/19 0914   BP: 116/78   Weight: 67.6 kg (149 lb)   Height: 1.6 m (5' 3\")   '      ROS - Pos for CC.  Patient denies current nausea, vomiting, chills, fevers, belly pain, calf pain, chest pain or SOB.  Complete remainder of ROS it otherwise neg.      PE:  Gen:   No apparent distress  Eye:    Visual scanning without deficit  Ear:    Response to auditory stimuli wnl  Lung:    Non-labored breathing on RA noted  Abd:    NTND per patient report  Lymph:    Moderate edema anterolateral L ankle  Vasc:    Pulses palpable, CFT minimally delayed  Neuro:    Light touch sensation intact to all sensory nerve distributions without paresthesias  Derm:    Neg for nodules, lesions or ulcerations  MSK:    Very tender over ATFL and CFL.  Ankle not stressed 2/2 to pain.  Tender over distal fibula  Calf:    Neg for redness, swelling or tenderness      Imaging:  3 views WB - unremarkable. Mortise is anatomically aligned.  No fractures noted.    Assessment:  23 year old female with new inversion ankle injury in setting of chronic left ankle instability      Plan:  Discussed etiologies, anatomy and options  1.  New inversion ankle injury in setting of chronic left ankle instability  -xrays of ankle, personally reviewed  -MRI left ankle ordered to assess any new damage, for surgical planning; scheduled 9/25/19 for ankle scope and lateral stabilization  -tensogrip for edema control.    -ALONSO/ENSAID vs tylenol prn  -WBAT with ankle brace    Follow up:  Prn for surgery consult or sooner with acute issues      Body mass index is 26.39 kg/m .  Weight management plan: Patient was referred to their PCP to discuss a diet and exercise plan.         Andres PINEDO" ROSIBEL Johnson FACFAS FACFAOM  Podiatric Foot & Ankle Surgeon  [facility]  [phone]

## 2019-09-05 NOTE — PATIENT INSTRUCTIONS
Thank you for choosing [facility]!    DR. CRISOSTOMO'S CLINIC LOCATIONS:   MONDAY - [place] TUESDAY - [place]   [address] [facility]  [address]   [place] [address] [place] [address]   [phone]       THURSDAY AM - [place] THURSDAY PM - [place]   [address]   [place] [address] [place], [place] [address] [phone]       FRIDAY AM - [place] SET UP SURGERY: [phone] [address] APPOINTMENTS: [phone]   [place] [address] BILLING QUESTIONS: [phone] FAX NUMBER: [phone]       Follow Up:  Follow up at surgery consult      FYI: The following information is included in the after visit summary for all patients:  Body weight can be a sensitive issue to discuss in clinic, but we think the following information is very important. Although we focus on the feet and ankles, we do support the overall health of our patients. Many things can cause foot and ankle problems. Foot structure, activity level, foot mechanics and injuries are common causes of pain. One very important issue that often goes unmentioned, is body weight. Extra weight can cause increased stress on muscles, ligaments, bones and tendons. Sometimes just a few extra pounds is all it takes to put one over her/his threshold. Without reducing that stress, it can be difficult to alleviate pain. As Foot & Ankle specialists, our job is addressing the lower extremity problem and possible causes. Regarding extra body weight, we encourage patients to discuss diet and weight management plans with their primary care doctors. It is this team approach that gives you the best opportunity for pain relief and getting you back on your feet. [facility] has a Comprehensive Weight Management Program. This program includes counseling, education, non-surgical and surgical approaches to weight loss. If you are interested in learning  more either talk to you primary care provider or call [phone].

## 2019-09-06 ENCOUNTER — MYC MEDICAL ADVICE (OUTPATIENT)
Dept: PODIATRY | Facility: CLINIC | Age: 24
End: 2019-09-06

## 2019-09-09 ENCOUNTER — HOSPITAL ENCOUNTER (OUTPATIENT)
Dept: MRI IMAGING | Facility: CLINIC | Age: 24
Discharge: HOME | End: 2019-09-09
Attending: PODIATRIST | Admitting: PODIATRIST
Payer: COMMERCIAL

## 2019-09-09 DIAGNOSIS — G89.29 CHRONIC PAIN OF LEFT ANKLE: ICD-10-CM

## 2019-09-09 DIAGNOSIS — M25.372 LEFT ANKLE INSTABILITY: ICD-10-CM

## 2019-09-09 DIAGNOSIS — M25.572 CHRONIC PAIN OF LEFT ANKLE: ICD-10-CM

## 2019-09-09 PROCEDURE — 73721 MRI JNT OF LWR EXTRE W/O DYE: CPT | Mod: LT

## 2019-09-10 ENCOUNTER — OFFICE VISIT (OUTPATIENT)
Dept: PODIATRY | Facility: CLINIC | Age: 24
End: 2019-09-10
Payer: COMMERCIAL

## 2019-09-10 VITALS
SYSTOLIC BLOOD PRESSURE: 112 MMHG | DIASTOLIC BLOOD PRESSURE: 80 MMHG | WEIGHT: 149 LBS | BODY MASS INDEX: 26.4 KG/M2 | HEIGHT: 63 IN | HEART RATE: 88 BPM

## 2019-09-10 DIAGNOSIS — G89.29 CHRONIC PAIN OF LEFT ANKLE: Primary | ICD-10-CM

## 2019-09-10 DIAGNOSIS — M25.572 CHRONIC PAIN OF LEFT ANKLE: Primary | ICD-10-CM

## 2019-09-10 DIAGNOSIS — M25.372 LEFT ANKLE INSTABILITY: ICD-10-CM

## 2019-09-10 PROCEDURE — 99214 OFFICE O/P EST MOD 30 MIN: CPT | Performed by: PODIATRIST

## 2019-09-10 ASSESSMENT — MIFFLIN-ST. JEOR: SCORE: 1394.99

## 2019-09-10 NOTE — PATIENT INSTRUCTIONS
Thank you for choosing [facility]!    DR. CRISOSTOMO'S CLINIC LOCATIONS:   MONDAY - [place] TUESDAY - [place]   [address] [place] [address]  [address]   [place] [address] [place] [address]   [phone]       THURSDAY AM - [place] THURSDAY PM - [place]   [address]   [place], [place] [address] [place] [address]   [phone]       FRIDAY AM - [place] SET UP SURGERY: [phone]   [address] APPOINTMENTS: [phone]   [place] [address] BILLING QUESTIONS: [phone] FAX NUMBER: [phone]     Follow Up: as needed  Thank you for choosing [facility]!    DR. CRISOSTOMO'S CLINIC LOCATIONS:   MONDAY - [place] TUESDAY - [address]  [address]   [place] [address] [place] [address]   [phone]       THURSDAY AM - [place] THURSDAY PM - [place]   [address]   [place], [place] [address] [place] [address]   [phone]       FRIDAY AM - [place] SET UP SURGERY: [phone]   [address] APPOINTMENTS: [phone]   [place] [address] BILLING QUESTIONS: [phone] FAX NUMBER: [phone]     FOOT & ANKLE SURGICAL RISKS  Undergoing surgical procedure involves a certain amount of risks. Risks of complications vary, depending on the complexity of the surgery and how you take care of the surgical area during the healing process. Complications can range from minor infection to death. Some complications are temporary while others will be permanent. Your surgeon weighs the risk of complications versus the potential benefit of undergoing the procedure. You need to consider your tolerance for unexpected problems as you decide whether to undergo surgery.    Foot and ankle surgery involves cutting skin, bone, ligaments, blood vessels, and joints.  These structures heal well but not without consequence. Any break in the skin can lead to infection. A deep infection can involve bone or joints, which can be devastating. Deep infection can lead to further surgeries such as amputation or could spread to other parts of the body. Most infections are minor and easily treated with oral antibiotics. Infections are often times from bacteria already present on your skin. Proper care of the surgical site is an essential component of avoiding infection. Do not get the bandage wet and take proper care of external pins to avoid these complications.    Joint stiffness is inherent to any foot or ankle surgery. Joint surgery is a major component of reconstructive foot and ankle procedures. The ligaments and tissue around the joint are released for exposure and/or correction of alignment. Scar tissue limits joint mobility. This can lead to hypersensitivity to touch, pain, problems wearing shoes, and need for revision surgery.    Bones do not always heal after surgery. Poor healing after a bone cut of joint fusion can lead to an extended period of casting, bone stimulators, or repeat surgery. A surgical nonunion is when bones do not heal properly. Smoking will almost always increase your risks of having postoperative complications. So if you smoke, quit now.    Bone grafting is sometimes necessary during the original or repeat surgery. Bone is sometimes taken from other parts of the body, freeze-dried cadaveric bone, or synthetic bone grafts may be used as needed.    BLOOD CLOT PREVENTION & EDUCATION  Foot and ankle surgery can lead to blood clots in the large veins of your legs. This is called a deep venous thrombosis or DVT. A DVT can be life threatening if a portion of the clot breaks away and travels to the lungs. A clot in the lungs is called a pulmonary embolism or PE.    Your risk of developing a DVT is dependent on many factors. Risk factors associated with surgery include  the type of surgery you are having (foot and ankle surgery is considered a much lower risk that knee, hip, or abdominal surgery), how long you are in a cast/boot, restricted ambulation, inability to move the ankle, and if you are hospitalized after surgery.    A number of medical conditions also increase your risk of DVT, including diabetes, use of estrogen medications such as birth control pills or postmenopausal medications, obesity, pregnancy, heart failure, cancer, etc.    Other risk factors include heavy smoking, advanced age (over 60 years old, but even those over 40 have increased risk), family of personal history of blood clots or clotting disorders. Symptoms of a DVT in the leg may include swelling, tenderness, a warm feeling or redness. A DVT can occur in both legs even if only one side is being treated. If you have these symptoms, call your doctor immediately.    Symptoms of a PE include chest pain, shortness of breath or the need to breath rapidly that is not associated with exercise, difficulty breathing, rapid heart rate, a feeling of passing out, and coughing or coughing up blood. A PE is an emergency so you need to be evaluated in the ER immediately if any of these symptoms occur. You could die from a PE. Call 911 right away. PE and DVT can occur without any symptoms in the leg and chest.    Prevention of DVT and PE is important. Your doctor may apply various types of compression to your legs before and after surgery. In addition, high risk patients may be place on a short course of blood thinning medication after surgery.    You can reduce your risk for DVT after surgery by getting up and moving/crawling/crutching around the house once or twice each hour while awake in the first few weeks. Seated range of motion exercises of your ankle and leg may also help. Moving your legs keeps blood flowing through your leg veins and reduced any pooling of blood that may clot. Be sure to follow your doctor's  instructions on elevation and weight bearing restrictions.      SURGICAL DRESSING  Your surgical dressing is a sterile dressing and should be left in place until removed by your doctor or their assistant at your first postop visit in clinic. Keep the dressing dry by covering it with a plastic bag during showers, taking baths with your surgical foot hanging outside of the tub, or by sponge bathing. If the dressing does become wet or dirty, call the clinic as it most likely needs to be changed. Do not change it yourself unless told otherwise by your surgeon. The safest plan is to wait to shower for three days after surgery. So take a long shower the morning of surgery.    Do not wear regular shoes with a surgical bandage and/or external pins in your foot. Wear loose fitting clothing that will easily slide over the dressing. Do not cover your surgical foot with blankets as it can contaminate the dressing. Also, remember to keep it away from your pets as they may try to chew on it.    If your surgeon places external pins in your foot, you must keep your foot dry until the pins are removed at six to eight weeks after surgery. Pins should be covered for protection but still examine the pin sites for loosening, movement, infection, or drainage whenever possible. Do not apply ointment on the pin sites and never push a loose pin back into place.    PRESURGICAL MEDICATION RECOMMENDATIONS  Certain prescription, over-the-counter and herbal medications interfere with healing after an operation. The main concern related to medications that increase bleeding at the surgical site. Excess blood under the incision results in poor wound healing, excess pain, increased scarring, and a higher risk of infection.    Some medications slow the healing process of bone. Medications can also interfere with anesthesia drugs that keep you asleep during the operation. It is important to ensure that these medications are out of your system prior  to the operation. The list below details a number of medications that are of concern. Pay special attention to how long you should avoid these medications prior to surgery. Please note that this list is not complete. You should ask your surgeon, pharmacist, or primary care physician if you are uncertain about other medications. Any herbal supplement not listed should be discontinued at least one week prior to surgery.    Aspirin: stop one week prior and may restart the day after surgery. This medication promotes bleeding.  Motrin/Ibuprofen/Aleve/Advil/NSAIDS: stop one week prior to surgery. These medications promote bleeding and may delay bone healing. Avoid these medications at least six weeks postop.  Coumadin: your primary care doctor will manage your coumadin in relation to surgery.  Enbrel: stop two weeks prior and may restart two weeks after. It may affect soft tissue healing and increase infection risk.  Remicade: stop eight to twelve weeks prior and may restart two weeks after. It can affect soft tissue healing and increase infection risk.  Humera: stop four weeks prior and may restart two weeks after. It can affect soft tissue healing and increase infection risk.  Methotrexate: stop taking on dose prior to surgery. It may be restarted when the wound is healing well.  Kava: stop at least one day prior and may restart one day after. It can cause increased sedative effects of anesthetics.  Ephedra (ma baldwin): stop at least one day prior and may restart one day after. It can increase risk of heart attack or stroke and bleeding at the surgical site.  Ernesto's Wort: stop at least five days prior and may restart one day after. It can diminish the effects of medications given during surgery.  Ginseng: stop at least one week prior and may restart one day after. It lowers blood sugar and can increase bleeding at the surgical site.  Ginkgo: stop 36 hours prior and may restart one day after. It can increase bleeding  at the surgical site.  Garlic: stop at least one week prior and may restart one day after.  Valerian: slowly decrease the dose over a few weeks prior to avoid withdrawal symptoms. It can increase sedative effects of anesthetics.  Echinacea: no stop/start date. It can be associated with allergic reactions and suppression of your immune system.  Vitamin E/Omgea-3/Fish Oil/Flax/Glucosamine/Chondroitin: stop two weeks prior and may restart one day after. They can increase bleeding at the surgical site.      TIPS FOR SUCCESSFUL POST-OP HEALING  How you care for your surgical site is critically important to achieve successful results. Avoidance of injury, infection, excess swelling, scar tissue, and stiffness are completely dependent on the care you provide over the next six weeks after surgery.    Your foot requires significant rest and elevation. Sitting for long hours with your foot elevated, however, will create its own problems. Expect muscle aches, back pain, cramps etc. Optimal posture, lumbar support, back exercises, ice, and heat may help with your new aches and pains. DO not apply a heating pad to your foot or leg, as this can worsen pain or swelling. Instead apply ice packs behind the involved knee. Do not apply it directly to the skin.    Narcotic pain medications and inactivity may also cause constipation. Limiting the use of these narcotics will help minimize this problem. The pain medication will not completely alleviate your pain. The purpose of pain pills is to take the edge off and help you get through the first few days. You can substitute extra strength Tylenol if pain is milder. Do not take Tylenol and prescription pain pills at the same time. Do not take more than 4,000mg of Tylenol in 24 hours. You can also minimize constipation by drinking plenty of water, eating lots of fruits and veggies, and taking the appropriate amount of Metamucil or other fiber supplements. These measures should be continued  while on narcotic pain medications and if you remain inactive.    Showering can be a major challenge after surgery. Your incision requires about three days to become sealed from water. Your bandage should not get wet or removed. Attempt to avoid showing for three days after surgery and try a sponge bath instead. It can be dangerous showering while standing on only one foot so be careful. Consider borrowing a shower chair. If the bandage does get wet, call us immediately for a dressing change as this can slow the healing process or cause infection.    External pins need to be kept dry without ointment or moisture. Keep them covered at all times. Protect them from clothing, blankets, and pets.  Any change or movement of the pins deserves a call to clinic. A loose pin will need to be removed. Never push them back into your foot.    Stiffness will develop after any operation due to scarring. The scar tissue begins to form immediately after the surgery. Inactivity can cause excess stiffness and may lead to blood clots, scar tissue, and adhesions.        SCAR CARE  Scarring is an unfortunate but unavoidable part of surgery. Every person scars differently and there is no way to predict how an individual's scar will look. Once the sutures are removed, there are a few things you can do to help minimize the scar tissue formation.    As soon as the skin is incised during surgery, the body is taking steps to prepare for healing. After about three days, the body has sent cells to the incision to begin the healing process. These cells, called fibroblasts, make collagen, a protein in the skin that helps provide strength. Once the skin has been sufficiently strengthened, the sutures are removed. Over the next year, the body synthesizes new collagen and breaks down old collagen to help achieve a strong scar that allows the foot and ankle to function appropriately. This is where patients can help the appearance of the scare, as it  will change over the next year.    1. Do not expose the scar to the sun for one year.  2. Wear shoes/socks or cover your scar with zinc oxide  3. Any sun exposure can permanently darken the scar  4. Massage the scar 2-3 times a day to break down the collagen  5. Apply lotion/Vitamin E on the scar using some pressure  6. Try over the counter products like Mederma/Scar Zone    SHOWERING BEFORE SURGERY  You must wash with the soap twice before coming to the hospital for your surgery. This will decrease bacteria (germs) on your skin. It will also help reduce your chance of infection (illness caused by germs) after surgery.  Read the directions and safety tips on the bottle of soap. Wash once the evening before surgery and once the morning of surgery. Use 4 ounces of soap each time. When showering, it is best to use 2 fresh washcloths and a fresh towel.   Items you will need for showerin newly washed washcloths    2 newly washed towels    8 ounces of soap  FOLLOW THESE INSTRUCTIONS:  The evening before surgery   1. Shower or bathe as you normally would, and use your regular soap and a clean washcloth. Give special attention to places where your incision (surgical cut) or catheters will be. This includes your groin area. Rinse well. You may wash your hair with your regular shampoo.  2. Next, wash your entire body from your chin down with the antiseptic soap. See the next page for directions about how to do this.  3. Rinse well and dry off using a newly washed towel.  The morning of surgery  Repeat steps 1, 2 and 3.   Other suggestions:    Wear freshly washed pajamas or clothing after your evening shower.    Wear freshly washed clothes the day of surgery.    Wash and change your bed sheets the day before surgery to have clean bed sheets after you shower and when you get home from surgery.    If you have trouble washing all areas, make sure someone helps you.    Don t use any deodorant, lotion or powder after your  shower.    Women who are menstruating should wear a fresh sanitary pad to the hospital.    Hibiclens - 4% CHG  1. Put about 1 ounce of soap onto a clean, damp washcloth. Wash your skin from your chin down to your toes. Pay special attention to your incision areas.  2. Gently rub your incision area and surrounding areas.  3. Repeat steps 1 and 2 until you have used 4 ounces. Make sure you gently rub your incision area and surrounding areas for a full 5 minutes.   4. Rinse with water and towel dry with a clean towel.       * If you have any post-operative questions regarding your procedure, call our triage team at the [facility] at [phone] (option 2 > option 3).      BODY WEIGHT AND YOUR FEET  The following information is included in the after visit summary for all patients. Body weight can be a sensitive issue to discuss in clinic, but we think the following information is very important. Although we focus on the feet and ankles, we do support the overall health of our patients.     Many things can cause foot and ankle problems. Foot structure, activity level, foot mechanics and injuries are common causes of pain. One very important issue that often goes unmentioned, is body weight. Extra weight can cause increased stress on muscles, ligaments, bones and tendons. Sometimes just a few extra pounds is all it takes to put one over her/his threshold. Without reducing that stress, it can be difficult to alleviate pain. As Foot & Ankle specialists, our job is addressing the lower extremity problem and possible causes. Regarding extra body weight, we encourage patients to discuss diet and weight management plans with their primary care doctors. It is this team approach that gives you the best opportunity for pain relief and getting you back on your feet.      [facility] has a Comprehensive Weight Management Program. This program includes counseling, education, non-surgical and surgical approaches  to weight loss. If you are interested in learning more either talk to you primary care provider or call [phone].      FYI: The following information is included in the after visit summary for all patients:  Body weight can be a sensitive issue to discuss in clinic, but we think the following information is very important. Although we focus on the feet and ankles, we do support the overall health of our patients. Many things can cause foot and ankle problems. Foot structure, activity level, foot mechanics and injuries are common causes of pain. One very important issue that often goes unmentioned, is body weight. Extra weight can cause increased stress on muscles, ligaments, bones and tendons. Sometimes just a few extra pounds is all it takes to put one over her/his threshold. Without reducing that stress, it can be difficult to alleviate pain. As Foot & Ankle specialists, our job is addressing the lower extremity problem and possible causes. Regarding extra body weight, we encourage patients to discuss diet and weight management plans with their primary care doctors. It is this team approach that gives you the best opportunity for pain relief and getting you back on your feet. [facility] has a Comprehensive Weight Management Program. This program includes counseling, education, non-surgical and surgical approaches to weight loss. If you are interested in learning more either talk to you primary care provider or call [phone].

## 2019-09-10 NOTE — PROGRESS NOTES
"Foot & Ankle Surgery   September 10, 2019    S:  Pt is seen today for surgical consult for L ankle pain/instability.  Conservative therapies that have been attempted without sufficient relief include bracing.  Because of insufficient relief, the patient wishes to forego further non-operative cares in favor of surgical intervention.  No guarantees have been given to the outcome.  States edema has worsened.  Recent new MRI of the L ankle was obtained    Vitals:    09/10/19 1042   BP: 112/80   Pulse: 88   Weight: 67.6 kg (149 lb)   Height: 1.6 m (5' 3\")     Body mass index is 26.39 kg/m .    ROS - Pos for CC.  Patient denies current nausea, vomiting, chills, fevers, belly pain, calf pain, chest pain or SOB.  Complete remainder of ROS is otherwise neg.      PE:  VASC -   Pulses are palpable, CFT minimally delayed  NEURO -   Light touch intact to all sensory nerve distributions without reported paresthesias.  DERM:    Neg for nodules, lesions or ulcerations  MSK:    Fluctuance without erythema over anterolateral ankle.  Tenderness overlying lateral and anterior ankle  LYMPH:     Neg for pitting/non-pitting edema, increased warmth or redness  CALF:   Neg for redness, swelling or tenderness.    Imaging:  IMPRESSION:    1. Full-thickness tear of the midportion of the anterior talofibular  ligament with fluid extending from the tibiotalar and/or posterior  subtalar joints through the defect in the ligament into the adjacent  lateral soft tissues, filling the previously seen gas-filled cavity.  2. No other significant abnormality.      Assessment:  23 year old female unstable left ankle after previous open ligament repair and subsequent infection with I&D    Plan:  The surgical procedure(s) was discussed in detail today.  Risks that were discussed include, but are not limited to, infection, wound healing complications, temporary or permanent nerve damage/numbness, painful scarring, possible recurrence of/new deformity, " over-correction, under-correction, possible abnormal bone healing, fixation/hardware failure, continued or new pain, the need to revise the procedure, as well as blood clots, limb loss, pain syndrome and death.  After a discussion of the procedure, risks, and post-operative care and course, the patient has elected to forego further non-operative management in favor of surgical intervention.  No guarantees were given.  The patient was informed that swelling and generalized discomfort can persist for months, and full recovery can often take up to a year.  I anticipate discontinuation of prescription pain medication at/shortly after suture removal.    Diagnosis:  above  Procedure:  STAT gram stain; ankle scope with ligament repair  Activity Level:  NWB 2-6 weeks  Pain Management:  Percocet, vistaril, ibuprofen  DVT prophylaxis:  Patient instructed on ankle ROM/calf massage exercises; patient advised on early frequent ambulation  Allergies:     Allergies   Allergen Reactions     Amoxil [Penicillins] Rash     Dad unsure of reaction.     Bee Venom Anaphylaxis     Contrast Dye Rash     Contrast Media Ready-[facility] [place], 04/09/2014.; Contrast [facility] [place], 04/09/2014.  NOTE: this is a contrast media oral with iodine. Premedicate with methylpred standard for IV contrast, request barium contrast for oral contrast.     Orange Fruit [Citrus] Anaphylaxis     Pineapple Anaphylaxis, Difficulty breathing and Rash     Reglan [Metoclopramide] Other (See Comments)     IV dose only, in ER, rapid heart rate.     Ace Inhibitors      Difficulty in breathing and GI upset     Amitiza [Lubiprostone] Nausea and Vomiting     Amoxicillin-Pot Clavulanate      Midazolam Unknown     parent states that when pt takes this medication, she wakes up being very violent .     No Clinical Screening - See Comments      Bleech/ chest tightness, itchy throat, swollen tongue, hives     Tizanidine Other (See Comments)     Confusion, back pain, photophobia,  abdominal pain, shaking, anxious       Versed      Coming out of pelvic exam at age of 5, was kicking and screaming when coming out of the versed.     Adhesive Tape Rash     Azithromycin Hives and Rash     Cephalexin Itching and Rash     Dispo:  Same day  WB assistive device:  Crutches, rollAbout  Smoking:  neg  Vit D status:  n/a  Clot/bleeding disorder history:   neg  Job duties/anticipated time off:  Not working    Billing today is based on a time of >25 minutes, more than 50% of which was spent on counseling and coordinating care.    Body mass index is 26.39 kg/m .  Weight management plan: Patient was referred to their PCP to discuss a diet and exercise plan.      Andres Johnson DPM FACFAS FACFAOM  Podiatric Foot & Ankle Surgeon  [facility]  [phone]

## 2019-09-13 DIAGNOSIS — K59.04 CHRONIC IDIOPATHIC CONSTIPATION: ICD-10-CM

## 2019-09-13 NOTE — TELEPHONE ENCOUNTER
linaclotide (LINZESS) 290 MCG capsule      Last Written Prescription Date:  05/01/2019  Last Fill Quantity: 90,   # refills: 3  Last Office Visit: 07/25/2019  Future Office visit:    Next 5 appointments (look out 90 days)    Sep 17, 2019 10:00 AM CDT  Pre-Op physical with EVI Cardona CNP  [facility] ([facility]) [address] [place] [id number]  [phone]   Sep 18, 2019 10:00 AM CDT  Return Visit with YESSICA Lynch  [facility] ([facility]) [facility]  [address]  [facility] [address]  [phone]   Oct 01, 2019 10:00 AM CDT  Return Visit with ROSIBEL [facility] ([facility]) [address]  [address]  [facility] [address]  [phone]   Oct 03, 2019  2:30 PM CDT  Return Visit with YESSICA Lynch  [facility] ([facility]  [address]  [facility] [address]  [phone]   Oct 08, 2019 10:30 AM CDT  Return Visit with ROSIBEL [facility] ([facility]) [address]  [address]  [facility] [address]  [phone]           Routing refill request to provider for review/approval because:  Drug not on the [facility], [facility] or  Health refill protocol or controlled substance

## 2019-09-14 ENCOUNTER — MYC MEDICAL ADVICE (OUTPATIENT)
Dept: PODIATRY | Facility: CLINIC | Age: 24
End: 2019-09-14

## 2019-09-16 NOTE — PROGRESS NOTES
"  [address] [address]  [phone]  Dept: [phone]    PRE-OP EVALUATION:  Today's date: 2019    Samara Oropeza (: 1995) presents for pre-operative evaluation assessment as requested by Dr. Johnson.  She requires evaluation and anesthesia risk assessment prior to undergoing surgery/procedure for treatment of Left Ankle Arthroscopy with Ligament Repair .    Proposed Surgery/ Procedure: Left Ankle Arthroscopy with Ligament Repair .  Date of Surgery/ Procedure: 2019  Time of Surgery/ Procedure: 7:30am    Primary Physician: Sonja Abreu  Type of Anesthesia Anticipated: to be determined    Patient has a Health Care Directive or Living Will:  NO    1. NO - Do you have a history of heart attack, stroke, stent, bypass or surgery on an artery in the head, neck, heart or legs?  2. YES - DO YOU EVER HAVE ANY PAIN OR DISCOMFORT IN YOUR CHEST?   3. NO - Do you have a history of  Heart Failure?  4. YES - ARE YOUR TROUBLED BY SHORTNESS OF BREATH WHEN WALKING ON THE LEVEL, UP A SLIGHT HILL OR AT NIGHT?   5. NO - Do you currently have a cold, bronchitis or other respiratory infection?  6. NO - Do you have a cough, shortness of breath or wheezing?  7. YES - DO YOU SOMETIMES GET PAINS IN THE CALVES OF YOUR LEGS WHEN YOU WALK?   8. NO - Do you or anyone in your family have previous history of blood clots?  9. NO - Do you or does anyone in your family have a serious bleeding problem such as prolonged bleeding following surgeries or cuts?  10. YES - HAVE YOU EVER HAD PROBLEMS WITH ANEMIA OR BEEN TOLD TO TAKE IRON PILLS?   11. YES - HAVE YOU HAD ANY ABNORMAL BLOOD LOSS SUCH AS BLACK, TARRY OR BLOODY STOOLS, OR ABNORMAL VAGINAL BLEEDING?  12. NO - Have you ever had a blood transfusion?  13. YES - HAVE YOU OR ANY OF YOUR RELATIVES EVER HAD PROBLEMS WITH ANESTHESIA? \"dpn't wake up when supposed to.\"  14. YES - DO YOU HAVE SLEEP APNEA, " EXCESSIVE SNORING OR DAYTIME DROWSINESS? drowsy  15. NO - Do you have any prosthetic heart valves?  16. NO - Do you have prosthetic joints?  17. NO - Is there any chance that you may be pregnant?      HPI:     HPI related to upcoming procedure: Ongoing cellulitis of left ankle; left ankle pain and instability      ANEMIA - Patient has a recent history of mild anemia, which has not been symptomatic. Work up to date has revealed inadequate dietary iron intake.     DEPRESSION - Patient has a long history of Depression and Anxiety of moderate severity requiring medication for control with recent symptoms being gradually worsening..Current symptoms of depression include depressed mood, anxious feelings.  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 Has lower appetite currently due to increased anxiety, but no change in bowel function.     MEDICAL HISTORY:     Patient Active Problem List    Diagnosis Date Noted     Infection of joint of ankle (H) 05/06/2019     Priority: Medium     Cellulitis 03/09/2019     Priority: Medium     Displacement of lumbar intervertebral disc without myelopathy 11/13/2018     Priority: Medium     Overview:   Created by Conversion       Iron deficiency associated with nonfamilial restless legs syndrome 11/13/2018     Priority: Medium     Enthesopathy of hip region 11/13/2018     Priority: Medium     Overview:   Created by Conversion       Tourette's syndrome 11/13/2018     Priority: Medium     Overview:   Created by Conversion       Somatic symptom disorder 06/01/2018     Priority: Medium     Pelvic floor weakness 04/25/2018     Priority: Medium     Spells of decreased attentiveness 12/19/2017     Priority: Medium     Mobile cecum 11/09/2017     Priority: Medium     Cecum noted in Right lower quadrant on 4/17 CT scan, and in Left upper Quadrant  on CT on 11/2017.       Vitamin D deficiency 10/11/2017     Priority: Medium     How low, unknown/not found. On D when tested at 27. Starting cholecalciferol October 2017. Needs recheck.       Convulsions, unspecified convulsion type (H) 10/03/2017     Priority: Medium     Transient alteration of awareness 10/03/2017     Priority: Medium     Chronic pain syndrome 07/27/2017     Priority: Medium     Major depressive disorder, recurrent episode, moderate (H) 06/27/2017     Priority: Medium     Cervical pain 05/02/2017     Priority: Medium     Acute left ankle pain 03/31/2017     Priority: Medium     Cervical dystonia 03/28/2017     Priority: Medium     PTSD (post-traumatic stress disorder) 01/17/2017     Priority: Medium     Patellofemoral instability 10/20/2016     Priority: Medium     Fibromyalgia 08/04/2016     Priority: Medium     Rheumatoid arthritis of multiple sites without rheumatoid factor (H) 08/04/2016     Priority: Medium     Raynaud's disease without gangrene 08/04/2016     Priority: Medium     Chronic abdominal pain 08/04/2016     Priority: Medium     Palpitations 01/12/2016     Priority: Medium     On colchicine therapy 10/30/2015     Priority: Medium     Spell of shaking 05/06/2015     Priority: Medium     Migraine 02/04/2015     Priority: Medium     Behcet's disease (H) 12/10/2014     Priority: Medium     Headaches due to old head injury 11/12/2013     Priority: Medium     Milk protein intolerance 10/11/2013     Priority: Medium     Intestinal malabsorption 10/11/2013     Priority: Medium     Concussion 02/13/2013     Priority: Medium     Jan 2013, with prolonged recovery- followed by sports med         Knee pain 01/03/2013     Priority: Medium     Generalized anxiety disorder 06/25/2009     Priority: Medium     Tics - Tourette syndrome 05/18/2009     Priority: Medium     Followed by psychotherapy. Symptoms well managed. Originally diagnosed at [facility]. (Dr. Simpson)           IBS (irritable  bowel syndrome) 05/18/2009     Priority: Medium     Allergic rhinitis 05/18/2009     Priority: Medium     GERD (gastroesophageal reflux disease) 01/10/2008     Priority: Medium     Gastroparesis 1994     Priority: Medium      Past Medical History:   Diagnosis Date     Anemia      Anxiety      Arthritis      Behcet's disease (H)      Cervical adenitis May 2010     Chronic abdominal pain      Constipation, chronic 1994     Fibromyalgia      Gastro-oesophageal reflux disease      Gastroparesis      Irregular heart beat     tachycardia, has had workup     Migraines      Neuromuscular disorder (H)     fibramyalgia     Palpitations      Seizure (H)      Seizures (H)     unknown etiology     Syncope      Tourette's      Past Surgical History:   Procedure Laterality Date     ARTHROSCOPY ANKLE, REPAIR LIGAMENT Left 1/2/2019    Procedure: Ankle arthroscopy and sinus tarsi evacuation, ligament repair, left lower extremity;  Surgeon: Andres Johnson DPM;  Location:  OR     ARTHROSCOPY KNEE WITH PATELLAR REALIGNMENT  7/25/2013    Procedure: ARTHROSCOPY KNEE WITH PATELLAR REALIGNMENT;  Left Knee Arthroscopy, Medial Patellofemoral Ligament Reconstruction with Allograft  ;  Surgeon: Jennifer Acevedo MD;  Location: [facility]     COLONOSCOPY  2015     DENTAL SURGERY  1996    Teeth removal     ENDOSCOPY UPPER, COLONOSCOPY, COMBINED  2005     HC ESOPH/GAS REFLUX TEST W NASAL IMPED >1 HR N/A 2/15/2017    Procedure: ESOPHAGEAL IMPEDENCE FUNCTION TEST WITH 24 HOUR PH GREATER THAN 1 HOUR;  Surgeon: Timothy Matta MD;  Location: [facility]     IR PICC PLACEMENT > 5 YRS OF AGE  3/13/2019     IRRIGATION AND DEBRIDEMENT FOOT, COMBINED Left 3/12/2019    Procedure: COMBINED IRRIGATION AND DEBRIDEMENT LEFT ANKLE;  Surgeon: Micha Glover MD;  Location: [facility]     IRRIGATION AND DEBRIDEMENT LOWER EXTREMITY, COMBINED Left 5/7/2019    Procedure: 1.  Excision of wound down to and including deep fascia, less than 20 cm2.   2.  Irrigation and debridement, left ankle.;  Surgeon: Andres Johnson DPM;  Location: [facility] OR     PICC INSERTION Left 05/05/2019    4Fr - 45cm (5cm external), Basilic vein, SVC RA junction     Current Outpatient Medications   Medication Sig Dispense Refill     citalopram (CELEXA) 20 MG tablet Take 1 tablet (20 mg) by mouth daily 90 tablet 1     LORazepam (ATIVAN) 0.5 MG tablet Take 1 tablet (0.5 mg) by mouth every 6 hours as needed for anxiety 10 tablet 0     albuterol (PROAIR HFA/PROVENTIL HFA/VENTOLIN HFA) 108 (90 BASE) MCG/ACT Inhaler Inhale 2 puffs into the lungs every 6 hours as needed for shortness of breath / dyspnea or wheezing 1 Inhaler 1     amitriptyline (ELAVIL) 25 MG tablet Take 1 tablet (25 mg) by mouth At Bedtime 90 tablet 1     artificial tears OINT ophthalmic ointment 0.5 inch strip each eye at night 1 Tube 11     aspirin (ASPIRIN CHILDRENS) 81 MG chewable tablet Take 81 mg by mouth as needed        benzocaine (TOPICALE XTRA) 20 % GEL Apply as needed locally to mouth or nasal ulcers for pain; 4 times daily as needed 30 g 1     betamethasone valerate (VALISONE) 0.1 % cream Apply topically 2 times daily as needed        bisacodyl (DULCOLAX) 5 MG EC tablet Take 2 tablets (10 mg) by mouth daily as needed for constipation 30 tablet 0     clobetasol (TEMOVATE) 0.05 % cream Apply topically 2 times daily 60 g 0     colchicine (COLCYRS) 0.6 MG tablet Take 1 tablet (0.6 mg) by mouth 2 times daily 180 tablet 1     cyproheptadine (PERIACTIN) 4 MG tablet Take 2 tablets (8 mg) by mouth At Bedtime For nightmares 180 tablet 2     dicyclomine (BENTYL) 20 MG tablet Take 1 tablet (20 mg) by mouth 4 times daily as needed (Patient not taking: Reported on 7/24/2019) 120 tablet 3     diphenhydrAMINE (BENADRYL) 25 MG tablet Take 1 tablet (25 mg) by mouth 3 times daily as needed (itching) 40 tablet 1     EPINEPHrine (EPIPEN 2-EAMON) 0.3 MG/0.3ML injection Inject 0.3 mLs (0.3 mg) into the muscle as needed for anaphylaxis  (Patient not taking: Reported on 7/24/2019) 0.6 mL 3     gabapentin (NEURONTIN) 100 MG capsule TAKE 1 CAPSULE (100 MG) BY MOUTH 3 TIMES DAILY AS NEEDED (ANXIETY) 90 capsule 1     guanFACINE (TENEX) 1 MG tablet Take 3 tablets (3 mg) by mouth twice daily in the morning and evening. 540 tablet 1     hydrocortisone 1 % CREA cream Place rectally 2 times daily as needed for itching 28.35 g 1     hyoscyamine (ANASPAZ/LEVSIN) 0.125 MG tablet Take 1-2 tablets (125-250 mcg) by mouth every 4 hours as needed for cramping 40 tablet 1     hypromellose (GENTEAL) 0.3 % SOLN 1 drop every hour as needed for dry eyes        lactulose 20 GM/30ML SOLN Take 30 mLs by mouth 3 times daily as needed (for constipation) (Patient not taking: Reported on 7/24/2019) 300 mL 3     lidocaine (LMX4) 4 % external cream Apply topically once as needed for mild pain 120 g 1     lidocaine (LMX4) 4 % external cream Apply 1 Application topically once as needed for mild pain       linaclotide (LINZESS) 290 MCG capsule Take 1 capsule (290 mcg) by mouth every morning (before breakfast) 90 capsule 0     omeprazole (PRILOSEC) 40 MG capsule Take 1 capsule (40 mg) by mouth daily (Patient not taking: Reported on 9/17/2019) 90 capsule 3     ondansetron (ZOFRAN-ODT) 8 MG ODT tab Take 1 tablet (8 mg) by mouth every 8 hours as needed for nausea 180 tablet 1     order for DME Equipment being ordered: Trilok brace 8 1/2 left lower extremity (Patient not taking: Reported on 9/17/2019) 1 Device 0     order for DME Roll-A-Bout Walker. Patient can use for 3 months (Patient not taking: Reported on 9/17/2019) 1 Units 0     order for DME Equipment being ordered: size 8 1/2 walking boot tall (Patient not taking: Reported on 9/17/2019) 1 Device 0     order for DME Equipment being ordered: RollAbout knee scooter (Patient not taking: Reported on 9/17/2019) 1 Device 0     polyethylene glycol (MIRALAX/GLYCOLAX) powder Take 1 capful by mouth 3 times daily       promethazine  (PHENERGAN) 12.5 MG tablet Take 1-2 tablets (12.5-25 mg) by mouth every 6 hours as needed for nausea (Patient not taking: Reported on 7/24/2019) 30 tablet 3     sennosides (SENOKOT) 8.6 MG tablet Take 3 tablets by mouth 2 times daily (Patient not taking: Reported on 9/17/2019) 240 tablet 3     sodium chloride 0.9% SOLN BOLUS Inject 1,000 mLs into the vein daily as needed (IV abx and flushes) 1000 mL 11     SPRINTEC 28 0.25-35 MG-MCG tablet Take one tablet by mouth daily. Take continuously. 84 tablet 3     sucralfate (CARAFATE) 1 GM/10ML suspension Take 10 mLs (1 g) by mouth 4 times daily 1200 mL 2     triamcinolone (KENALOG) 0.5 % external ointment Apply 1 g topically 3 times daily as needed for irritation 15 g 3     OTC products: None, except as noted above    Allergies   Allergen Reactions     Amoxil [Penicillins] Rash     Dad unsure of reaction.     Bee Venom Anaphylaxis     Contrast Dye Rash     Contrast Media Ready-Box [facility], 04/09/2014.; Contrast Media Ready-Box [facility], 04/09/2014.  NOTE: this is a contrast media oral with iodine. Premedicate with methylpred standard for IV contrast, request barium contrast for oral contrast.     Orange Fruit [Citrus] Anaphylaxis     Pineapple Anaphylaxis, Difficulty breathing and Rash     Reglan [Metoclopramide] Other (See Comments)     IV dose only, in ER, rapid heart rate.     Ace Inhibitors      Difficulty in breathing and GI upset     Amitiza [Lubiprostone] Nausea and Vomiting     Amoxicillin-Pot Clavulanate      Midazolam Unknown     parent states that when pt takes this medication, she wakes up being very violent .     No Clinical Screening - See Comments      Bleech/ chest tightness, itchy throat, swollen tongue, hives     Tizanidine Other (See Comments)     Confusion, back pain, photophobia, abdominal pain, shaking, anxious       Versed      Coming out of pelvic exam at age of 5, was kicking and screaming when coming out of the versed.     Adhesive Tape Rash      "Azithromycin Hives and Rash     Cephalexin Itching and Rash      Latex Allergy: NO    Social History     Tobacco Use     Smoking status: Never Smoker     Smokeless tobacco: Never Used   Substance Use Topics     Alcohol use: Yes     Alcohol/week: 0.6 oz     Types: 1 Standard drinks or equivalent per week     Comment: rarely     History   Drug Use     Types: Marijuana     Comment: occassional marijuana only, denies others       REVIEW OF SYSTEMS:   CONSTITUTIONAL: NEGATIVE for fever, chills, change in weight  INTEGUMENTARY/SKIN: ongoing genital ulcers  EYES: NEGATIVE for vision changes or irritation  ENT/MOUTH: NEGATIVE for ear, mouth and throat problems  RESP: NEGATIVE for significant cough or SOB  BREAST: NEGATIVE for masses, tenderness or discharge  CV: NEGATIVE for chest pain, palpitations or peripheral edema  GI: NEGATIVE for changes in bowel habits   female: dysuria and frequency  MUSCULOSKELETAL:POSITIVE  for arthralgias   NEURO: NEGATIVE for weakness, dizziness or paresthesias  ENDOCRINE: NEGATIVE for temperature intolerance, skin/hair changes  HEME: NEGATIVE for bleeding problems  PSYCHIATRIC: NEGATIVE for changes in mood or affect    EXAM:   /62   Pulse 100   Temp 98.5  F (36.9  C) (Oral)   Ht 1.6 m (5' 3\")   Wt 71.8 kg (158 lb 6.4 oz)   SpO2 99%   BMI 28.06 kg/m      GENERAL APPEARANCE: healthy, alert and no distress     EYES: EOMI, PERRL     HENT: ear canals and TM's normal and nose and mouth without ulcers or lesions     NECK: no adenopathy, no asymmetry, masses, or scars and thyroid normal to palpation     RESP: lungs clear to auscultation - no rales, rhonchi or wheezes     CV: regular rates and rhythm, normal S1 S2, no S3 or S4 and no murmur, click or rub     ABDOMEN:  soft, nontender, no HSM or masses and bowel sounds normal     SKIN: no suspicious lesions or rashes     NEURO: Normal strength and tone, sensory exam grossly normal, mentation intact and speech normal     PSYCH: mentation " appears normal. and affect normal/bright     LYMPHATICS: No cervical adenopathy    DIAGNOSTICS:   EKG: Not indicated due to non-vascular surgery and low risk of event (age <65 and without cardiac risk factors)  Hemoglobin (indicated for history of anemia or procedure with significant blood loss such as tonsillectomy, major intraperitoneal surgery, vascular surgery, major spine surgery, total joint replacement)    Recent Labs   Lab Test 05/21/19  1545 05/14/19  1340  05/09/19  0707 05/07/19  0644  11/06/16  1341  05/06/15  0220   HGB 10.6* 10.9*   < > 10.7* 10.0*   < > 13.4   < > 12.2    243   < > 211 181   < > 200   < > 193   INR  --   --   --   --   --   --  0.99  --  1.03   NA  --   --   --  138 139   < > 142   < > 143   POTASSIUM  --   --   --  3.9 3.8   < > 3.6   < > 3.8   CR 0.63 0.60  --  0.72 0.63   < > 0.78   < > 0.66    < > = values in this interval not displayed.        IMPRESSION:   Reason for surgery/procedure: Left Ankle Arthroscopy with Ligament Repair  Diagnosis/reason for consult: Chronic Ankle Pain    The proposed surgical procedure is considered INTERMEDIATE risk.    REVISED CARDIAC RISK INDEX  The patient has the following serious cardiovascular risks for perioperative complications such as (MI, PE, VFib and 3  AV Block):  No serious cardiac risks  INTERPRETATION: 0 risks: Class I (very low risk - 0.4% complication rate)    The patient has the following additional risks for perioperative complications:  No identified additional risks      ICD-10-CM    1. Acute left ankle pain M25.572    2. Preop general physical exam Z01.818 Comprehensive metabolic panel   3. History of anemia Z86.2 CBC with platelets and differential     Iron and iron binding capacity     Vitamin B12   4. Dysuria R30.0 UA reflex to Microscopic ([facility]; [facility])   5. TAHIR (generalized anxiety disorder) F41.1 citalopram (CELEXA) 20 MG tablet     LORazepam (ATIVAN) 0.5 MG tablet   6. Chronic pain syndrome G89.4   "  7. Infection of joint of ankle (H) M00.9    8. Rheumatoid arthritis of multiple sites without rheumatoid factor (H) M06.09    9. Cellulitis of left lower extremity L03.116    10. Irritable bowel syndrome with constipation K58.1        RECOMMENDATIONS:     --Consult hospital rounder / IM to assist post-op medical management  -Patient to discuss with Anesthesiology concerns regarding Fentanyl (which she stated helped with pain but made her feel \"out of it\") and questions concerning having nerve block after she has been sedated    --Patient is to take all scheduled medications on the day of surgery EXCEPT for modifications listed below.    APPROVAL GIVEN to proceed with proposed procedure, without further diagnostic evaluation       Signed Electronically by: EVI Cardona CNP    Copy of this evaluation report is provided to requesting physician.    [facility] Preop Guidelines    Revised Cardiac Risk Index  "

## 2019-09-16 NOTE — TELEPHONE ENCOUNTER
Called and spoke to patient.  Answered her questions regarding surgery arrival time, NPO and soap prior to surgery.     Patient was also instructed that someone from pre-admitting at the Hospital should be calling her as well. Patient was given my number if she had additional questions.       Jim Brennan, Surgery Scheduler

## 2019-09-17 ENCOUNTER — OFFICE VISIT (OUTPATIENT)
Dept: FAMILY MEDICINE | Facility: CLINIC | Age: 24
End: 2019-09-17
Payer: COMMERCIAL

## 2019-09-17 VITALS
DIASTOLIC BLOOD PRESSURE: 62 MMHG | HEIGHT: 63 IN | HEART RATE: 100 BPM | BODY MASS INDEX: 28.07 KG/M2 | SYSTOLIC BLOOD PRESSURE: 110 MMHG | TEMPERATURE: 98.5 F | OXYGEN SATURATION: 99 % | WEIGHT: 158.4 LBS

## 2019-09-17 DIAGNOSIS — M00.9 INFECTION OF JOINT OF ANKLE (H): ICD-10-CM

## 2019-09-17 DIAGNOSIS — M06.09 RHEUMATOID ARTHRITIS OF MULTIPLE SITES WITHOUT RHEUMATOID FACTOR (H): ICD-10-CM

## 2019-09-17 DIAGNOSIS — G89.29 CHRONIC PAIN OF LEFT ANKLE: ICD-10-CM

## 2019-09-17 DIAGNOSIS — L03.116 CELLULITIS OF LEFT LOWER EXTREMITY: ICD-10-CM

## 2019-09-17 DIAGNOSIS — Z86.2 HISTORY OF ANEMIA: ICD-10-CM

## 2019-09-17 DIAGNOSIS — K58.1 IRRITABLE BOWEL SYNDROME WITH CONSTIPATION: ICD-10-CM

## 2019-09-17 DIAGNOSIS — F41.1 GAD (GENERALIZED ANXIETY DISORDER): ICD-10-CM

## 2019-09-17 DIAGNOSIS — G89.4 CHRONIC PAIN SYNDROME: ICD-10-CM

## 2019-09-17 DIAGNOSIS — M25.572 CHRONIC PAIN OF LEFT ANKLE: ICD-10-CM

## 2019-09-17 DIAGNOSIS — Z01.818 PREOP GENERAL PHYSICAL EXAM: Primary | ICD-10-CM

## 2019-09-17 DIAGNOSIS — R30.0 DYSURIA: ICD-10-CM

## 2019-09-17 PROBLEM — K12.0 APHTHOUS ULCER: Status: RESOLVED | Noted: 2018-11-13 | Resolved: 2019-09-17

## 2019-09-17 LAB
ALBUMIN SERPL-MCNC: 3.6 G/DL (ref 3.4–5)
ALP SERPL-CCNC: 60 U/L (ref 40–150)
ALT SERPL W P-5'-P-CCNC: 23 U/L (ref 0–50)
ANION GAP SERPL CALCULATED.3IONS-SCNC: 10 MMOL/L (ref 3–14)
AST SERPL W P-5'-P-CCNC: 25 U/L (ref 0–45)
BASOPHILS # BLD AUTO: 0 10E9/L (ref 0–0.2)
BASOPHILS NFR BLD AUTO: 0.2 %
BILIRUB SERPL-MCNC: 0.5 MG/DL (ref 0.2–1.3)
BUN SERPL-MCNC: 8 MG/DL (ref 7–30)
CALCIUM SERPL-MCNC: 9.4 MG/DL (ref 8.5–10.1)
CHLORIDE SERPL-SCNC: 109 MMOL/L (ref 94–109)
CO2 SERPL-SCNC: 20 MMOL/L (ref 20–32)
CREAT SERPL-MCNC: 0.72 MG/DL (ref 0.52–1.04)
DIFFERENTIAL METHOD BLD: ABNORMAL
EOSINOPHIL # BLD AUTO: 0.1 10E9/L (ref 0–0.7)
EOSINOPHIL NFR BLD AUTO: 2.1 %
ERYTHROCYTE [DISTWIDTH] IN BLOOD BY AUTOMATED COUNT: 14.6 % (ref 10–15)
GFR SERPL CREATININE-BSD FRML MDRD: >90 ML/MIN/{1.73_M2}
GLUCOSE SERPL-MCNC: 87 MG/DL (ref 70–99)
HCT VFR BLD AUTO: 35.5 % (ref 35–47)
HGB BLD-MCNC: 11.3 G/DL (ref 11.7–15.7)
IRON SATN MFR SERPL: 9 % (ref 15–46)
IRON SERPL-MCNC: 55 UG/DL (ref 35–180)
LYMPHOCYTES # BLD AUTO: 1.5 10E9/L (ref 0.8–5.3)
LYMPHOCYTES NFR BLD AUTO: 28.8 %
MCH RBC QN AUTO: 27.5 PG (ref 26.5–33)
MCHC RBC AUTO-ENTMCNC: 31.8 G/DL (ref 31.5–36.5)
MCV RBC AUTO: 86 FL (ref 78–100)
MONOCYTES # BLD AUTO: 0.3 10E9/L (ref 0–1.3)
MONOCYTES NFR BLD AUTO: 6 %
NEUTROPHILS # BLD AUTO: 3.4 10E9/L (ref 1.6–8.3)
NEUTROPHILS NFR BLD AUTO: 62.9 %
PLATELET # BLD AUTO: 227 10E9/L (ref 150–450)
POTASSIUM SERPL-SCNC: 3.6 MMOL/L (ref 3.4–5.3)
PROT SERPL-MCNC: 6.9 G/DL (ref 6.8–8.8)
RBC # BLD AUTO: 4.11 10E12/L (ref 3.8–5.2)
SODIUM SERPL-SCNC: 139 MMOL/L (ref 133–144)
TIBC SERPL-MCNC: 593 UG/DL (ref 240–430)
VIT B12 SERPL-MCNC: 356 PG/ML (ref 193–986)
WBC # BLD AUTO: 5.4 10E9/L (ref 4–11)

## 2019-09-17 PROCEDURE — 83540 ASSAY OF IRON: CPT | Performed by: NURSE PRACTITIONER

## 2019-09-17 PROCEDURE — 85025 COMPLETE CBC W/AUTO DIFF WBC: CPT | Performed by: NURSE PRACTITIONER

## 2019-09-17 PROCEDURE — 36415 COLL VENOUS BLD VENIPUNCTURE: CPT | Performed by: NURSE PRACTITIONER

## 2019-09-17 PROCEDURE — 80053 COMPREHEN METABOLIC PANEL: CPT | Performed by: NURSE PRACTITIONER

## 2019-09-17 PROCEDURE — 99215 OFFICE O/P EST HI 40 MIN: CPT | Performed by: NURSE PRACTITIONER

## 2019-09-17 PROCEDURE — 83550 IRON BINDING TEST: CPT | Performed by: NURSE PRACTITIONER

## 2019-09-17 PROCEDURE — 82607 VITAMIN B-12: CPT | Performed by: NURSE PRACTITIONER

## 2019-09-17 RX ORDER — LORAZEPAM 0.5 MG/1
0.5 TABLET ORAL EVERY 6 HOURS PRN
Qty: 10 TABLET | Refills: 0 | Status: SHIPPED | OUTPATIENT
Start: 2019-09-17 | End: 2019-12-05

## 2019-09-17 RX ORDER — CITALOPRAM HYDROBROMIDE 20 MG/1
20 TABLET ORAL DAILY
Qty: 90 TABLET | Refills: 1 | Status: SHIPPED | OUTPATIENT
Start: 2019-09-17 | End: 2020-03-16

## 2019-09-17 ASSESSMENT — MIFFLIN-ST. JEOR: SCORE: 1437.63

## 2019-09-18 ENCOUNTER — OFFICE VISIT (OUTPATIENT)
Dept: PSYCHOLOGY | Facility: CLINIC | Age: 24
End: 2019-09-18
Payer: COMMERCIAL

## 2019-09-18 DIAGNOSIS — F41.1 GAD (GENERALIZED ANXIETY DISORDER): Primary | ICD-10-CM

## 2019-09-18 DIAGNOSIS — F32.A DEPRESSION: ICD-10-CM

## 2019-09-18 PROCEDURE — 90834 PSYTX W PT 45 MINUTES: CPT | Performed by: COUNSELOR

## 2019-09-18 ASSESSMENT — ANXIETY QUESTIONNAIRES
5. BEING SO RESTLESS THAT IT IS HARD TO SIT STILL: SEVERAL DAYS
3. WORRYING TOO MUCH ABOUT DIFFERENT THINGS: SEVERAL DAYS
1. FEELING NERVOUS, ANXIOUS, OR ON EDGE: MORE THAN HALF THE DAYS
7. FEELING AFRAID AS IF SOMETHING AWFUL MIGHT HAPPEN: NOT AT ALL
GAD7 TOTAL SCORE: 8
IF YOU CHECKED OFF ANY PROBLEMS ON THIS QUESTIONNAIRE, HOW DIFFICULT HAVE THESE PROBLEMS MADE IT FOR YOU TO DO YOUR WORK, TAKE CARE OF THINGS AT HOME, OR GET ALONG WITH OTHER PEOPLE: SOMEWHAT DIFFICULT
2. NOT BEING ABLE TO STOP OR CONTROL WORRYING: SEVERAL DAYS
6. BECOMING EASILY ANNOYED OR IRRITABLE: SEVERAL DAYS
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
## 2019-09-19 ASSESSMENT — ANXIETY QUESTIONNAIRES: GAD7 TOTAL SCORE: 8

## 2019-09-25 ENCOUNTER — ANESTHESIA EVENT (OUTPATIENT)
Dept: SURGERY | Facility: CLINIC | Age: 24
End: 2019-09-25
Payer: COMMERCIAL

## 2019-09-25 ENCOUNTER — APPOINTMENT (OUTPATIENT)
Dept: GENERAL RADIOLOGY | Facility: CLINIC | Age: 24
End: 2019-09-25
Attending: PODIATRIST
Payer: COMMERCIAL

## 2019-09-25 ENCOUNTER — HOSPITAL ENCOUNTER (OUTPATIENT)
Facility: CLINIC | Age: 24
Discharge: HOME | End: 2019-09-25
Attending: PODIATRIST | Admitting: PODIATRIST
Payer: COMMERCIAL

## 2019-09-25 ENCOUNTER — ANESTHESIA (OUTPATIENT)
Dept: SURGERY | Facility: CLINIC | Age: 24
End: 2019-09-25
Payer: COMMERCIAL

## 2019-09-25 VITALS
WEIGHT: 161 LBS | HEART RATE: 105 BPM | OXYGEN SATURATION: 99 % | HEIGHT: 63 IN | BODY MASS INDEX: 28.53 KG/M2 | TEMPERATURE: 97.5 F | DIASTOLIC BLOOD PRESSURE: 73 MMHG | SYSTOLIC BLOOD PRESSURE: 104 MMHG | RESPIRATION RATE: 14 BRPM

## 2019-09-25 DIAGNOSIS — Z98.890 S/P ANKLE LIGAMENT REPAIR: Primary | ICD-10-CM

## 2019-09-25 LAB
GRAM STN SPEC: NORMAL
HCG UR QL: NEGATIVE
Lab: NORMAL
SPECIMEN SOURCE: NORMAL

## 2019-09-25 PROCEDURE — 25000125 ZZHC RX 250: Performed by: PODIATRIST

## 2019-09-25 PROCEDURE — 25000128 H RX IP 250 OP 636: Performed by: NURSE ANESTHETIST, CERTIFIED REGISTERED

## 2019-09-25 PROCEDURE — 36000063 ZZH SURGERY LEVEL 4 EA 15 ADDTL MIN: Performed by: PODIATRIST

## 2019-09-25 PROCEDURE — 25800030 ZZH RX IP 258 OP 636: Performed by: ANESTHESIOLOGY

## 2019-09-25 PROCEDURE — 71000012 ZZH RECOVERY PHASE 1 LEVEL 1 FIRST HR: Performed by: PODIATRIST

## 2019-09-25 PROCEDURE — 81025 URINE PREGNANCY TEST: CPT | Performed by: PODIATRIST

## 2019-09-25 PROCEDURE — C1713 ANCHOR/SCREW BN/BN,TIS/BN: HCPCS | Performed by: PODIATRIST

## 2019-09-25 PROCEDURE — 37000008 ZZH ANESTHESIA TECHNICAL FEE, 1ST 30 MIN: Performed by: PODIATRIST

## 2019-09-25 PROCEDURE — 29895 ANKLE ARTHROSCOPY/SURGERY: CPT | Mod: 51 | Performed by: PODIATRIST

## 2019-09-25 PROCEDURE — 40000170 ZZH STATISTIC PRE-PROCEDURE ASSESSMENT II: Performed by: PODIATRIST

## 2019-09-25 PROCEDURE — 88305 TISSUE EXAM BY PATHOLOGIST: CPT | Mod: 26 | Performed by: PODIATRIST

## 2019-09-25 PROCEDURE — 25800030 ZZH RX IP 258 OP 636: Performed by: NURSE ANESTHETIST, CERTIFIED REGISTERED

## 2019-09-25 PROCEDURE — 25000125 ZZHC RX 250: Performed by: NURSE ANESTHETIST, CERTIFIED REGISTERED

## 2019-09-25 PROCEDURE — 40000985 XR ANKLE PORT LT 2 VW: Mod: LT

## 2019-09-25 PROCEDURE — 71000013 ZZH RECOVERY PHASE 1 LEVEL 1 EA ADDTL HR: Performed by: PODIATRIST

## 2019-09-25 PROCEDURE — 36000093 ZZH SURGERY LEVEL 4 1ST 30 MIN: Performed by: PODIATRIST

## 2019-09-25 PROCEDURE — 37000009 ZZH ANESTHESIA TECHNICAL FEE, EACH ADDTL 15 MIN: Performed by: PODIATRIST

## 2019-09-25 PROCEDURE — 88305 TISSUE EXAM BY PATHOLOGIST: CPT | Performed by: PODIATRIST

## 2019-09-25 PROCEDURE — 25000128 H RX IP 250 OP 636: Performed by: ANESTHESIOLOGY

## 2019-09-25 PROCEDURE — 25000128 H RX IP 250 OP 636: Performed by: PODIATRIST

## 2019-09-25 PROCEDURE — 25000566 ZZH SEVOFLURANE, EA 15 MIN: Performed by: PODIATRIST

## 2019-09-25 PROCEDURE — 40000277 XR SURGERY CARM FLUORO LESS THAN 5 MIN W STILLS

## 2019-09-25 PROCEDURE — 71000027 ZZH RECOVERY PHASE 2 EACH 15 MINS: Performed by: PODIATRIST

## 2019-09-25 PROCEDURE — 87205 SMEAR GRAM STAIN: CPT | Performed by: PODIATRIST

## 2019-09-25 PROCEDURE — 27695 REPAIR OF ANKLE LIGAMENT: CPT | Mod: LT | Performed by: PODIATRIST

## 2019-09-25 PROCEDURE — 27210794 ZZH OR GENERAL SUPPLY STERILE: Performed by: PODIATRIST

## 2019-09-25 DEVICE — IMP KIT REPAIR LIGAMENT AUGMENTATION INT BRACE AR-1678-CP: Type: IMPLANTABLE DEVICE | Site: ANKLE | Status: FUNCTIONAL

## 2019-09-25 RX ORDER — NEOSTIGMINE METHYLSULFATE 1 MG/ML
VIAL (ML) INJECTION PRN
Status: DISCONTINUED | OUTPATIENT
Start: 2019-09-25 | End: 2019-09-25

## 2019-09-25 RX ORDER — MEPERIDINE HYDROCHLORIDE 25 MG/ML
12.5 INJECTION INTRAMUSCULAR; INTRAVENOUS; SUBCUTANEOUS
Status: DISCONTINUED | OUTPATIENT
Start: 2019-09-25 | End: 2019-09-25 | Stop reason: HOSPADM

## 2019-09-25 RX ORDER — SODIUM CHLORIDE, SODIUM LACTATE, POTASSIUM CHLORIDE, CALCIUM CHLORIDE 600; 310; 30; 20 MG/100ML; MG/100ML; MG/100ML; MG/100ML
INJECTION, SOLUTION INTRAVENOUS CONTINUOUS
Status: DISCONTINUED | OUTPATIENT
Start: 2019-09-25 | End: 2019-09-25 | Stop reason: HOSPADM

## 2019-09-25 RX ORDER — KETOROLAC TROMETHAMINE 30 MG/ML
INJECTION, SOLUTION INTRAMUSCULAR; INTRAVENOUS PRN
Status: DISCONTINUED | OUTPATIENT
Start: 2019-09-25 | End: 2019-09-25

## 2019-09-25 RX ORDER — FENTANYL CITRATE 50 UG/ML
25-50 INJECTION, SOLUTION INTRAMUSCULAR; INTRAVENOUS
Status: COMPLETED | OUTPATIENT
Start: 2019-09-25 | End: 2019-09-25

## 2019-09-25 RX ORDER — GLYCOPYRROLATE 0.2 MG/ML
INJECTION, SOLUTION INTRAMUSCULAR; INTRAVENOUS PRN
Status: DISCONTINUED | OUTPATIENT
Start: 2019-09-25 | End: 2019-09-25

## 2019-09-25 RX ORDER — BUPIVACAINE HYDROCHLORIDE 2.5 MG/ML
INJECTION, SOLUTION EPIDURAL; INFILTRATION; INTRACAUDAL
Status: DISCONTINUED
Start: 2019-09-25 | End: 2019-09-25 | Stop reason: HOSPADM

## 2019-09-25 RX ORDER — CLINDAMYCIN PHOSPHATE 900 MG/50ML
900 INJECTION, SOLUTION INTRAVENOUS SEE ADMIN INSTRUCTIONS
Status: DISCONTINUED | OUTPATIENT
Start: 2019-09-25 | End: 2019-09-25 | Stop reason: HOSPADM

## 2019-09-25 RX ORDER — LIDOCAINE HYDROCHLORIDE 20 MG/ML
INJECTION, SOLUTION INFILTRATION; PERINEURAL PRN
Status: DISCONTINUED | OUTPATIENT
Start: 2019-09-25 | End: 2019-09-25

## 2019-09-25 RX ORDER — HYDROMORPHONE HYDROCHLORIDE 1 MG/ML
.3-.5 INJECTION, SOLUTION INTRAMUSCULAR; INTRAVENOUS; SUBCUTANEOUS EVERY 10 MIN PRN
Status: DISCONTINUED | OUTPATIENT
Start: 2019-09-25 | End: 2019-09-25 | Stop reason: HOSPADM

## 2019-09-25 RX ORDER — FENTANYL CITRATE 50 UG/ML
INJECTION, SOLUTION INTRAMUSCULAR; INTRAVENOUS PRN
Status: DISCONTINUED | OUTPATIENT
Start: 2019-09-25 | End: 2019-09-25

## 2019-09-25 RX ORDER — FENTANYL CITRATE 50 UG/ML
25-50 INJECTION, SOLUTION INTRAMUSCULAR; INTRAVENOUS
Status: DISCONTINUED | OUTPATIENT
Start: 2019-09-25 | End: 2019-09-25 | Stop reason: HOSPADM

## 2019-09-25 RX ORDER — PROPOFOL 10 MG/ML
INJECTION, EMULSION INTRAVENOUS CONTINUOUS PRN
Status: DISCONTINUED | OUTPATIENT
Start: 2019-09-25 | End: 2019-09-25

## 2019-09-25 RX ORDER — ONDANSETRON 2 MG/ML
INJECTION INTRAMUSCULAR; INTRAVENOUS PRN
Status: DISCONTINUED | OUTPATIENT
Start: 2019-09-25 | End: 2019-09-25

## 2019-09-25 RX ORDER — HYDROXYZINE HYDROCHLORIDE 25 MG/1
TABLET, FILM COATED ORAL
Qty: 30 TABLET | Refills: 0 | Status: SHIPPED | OUTPATIENT
Start: 2019-09-25 | End: 2020-07-27

## 2019-09-25 RX ORDER — PROPOFOL 10 MG/ML
INJECTION, EMULSION INTRAVENOUS PRN
Status: DISCONTINUED | OUTPATIENT
Start: 2019-09-25 | End: 2019-09-25

## 2019-09-25 RX ORDER — OXYCODONE HYDROCHLORIDE 10 MG/1
TABLET ORAL
Qty: 30 TABLET | Refills: 0 | Status: SHIPPED | OUTPATIENT
Start: 2019-09-25 | End: 2019-10-30

## 2019-09-25 RX ORDER — FENTANYL CITRATE 50 UG/ML
25 INJECTION, SOLUTION INTRAMUSCULAR; INTRAVENOUS ONCE
Status: COMPLETED | OUTPATIENT
Start: 2019-09-25 | End: 2019-09-25

## 2019-09-25 RX ORDER — IBUPROFEN 600 MG/1
TABLET, FILM COATED ORAL
Qty: 28 TABLET | Refills: 0 | Status: SHIPPED | OUTPATIENT
Start: 2019-09-25 | End: 2019-10-30

## 2019-09-25 RX ORDER — OXYCODONE HYDROCHLORIDE 5 MG/1
5 TABLET ORAL
Status: DISCONTINUED | OUTPATIENT
Start: 2019-09-25 | End: 2019-09-25 | Stop reason: HOSPADM

## 2019-09-25 RX ORDER — CLINDAMYCIN PHOSPHATE 900 MG/50ML
900 INJECTION, SOLUTION INTRAVENOUS
Status: DISCONTINUED | OUTPATIENT
Start: 2019-09-25 | End: 2019-09-25 | Stop reason: HOSPADM

## 2019-09-25 RX ORDER — DEXAMETHASONE SODIUM PHOSPHATE 4 MG/ML
INJECTION, SOLUTION INTRA-ARTICULAR; INTRALESIONAL; INTRAMUSCULAR; INTRAVENOUS; SOFT TISSUE PRN
Status: DISCONTINUED | OUTPATIENT
Start: 2019-09-25 | End: 2019-09-25

## 2019-09-25 RX ORDER — ONDANSETRON 2 MG/ML
4 INJECTION INTRAMUSCULAR; INTRAVENOUS EVERY 30 MIN PRN
Status: DISCONTINUED | OUTPATIENT
Start: 2019-09-25 | End: 2019-09-25 | Stop reason: HOSPADM

## 2019-09-25 RX ORDER — BUPIVACAINE HYDROCHLORIDE 2.5 MG/ML
INJECTION, SOLUTION EPIDURAL; INFILTRATION; INTRACAUDAL PRN
Status: DISCONTINUED | OUTPATIENT
Start: 2019-09-25 | End: 2019-09-25 | Stop reason: HOSPADM

## 2019-09-25 RX ORDER — BUPIVACAINE HYDROCHLORIDE 2.5 MG/ML
INJECTION, SOLUTION EPIDURAL; INFILTRATION; INTRACAUDAL
Status: DISCONTINUED
Start: 2019-09-25 | End: 2019-09-25 | Stop reason: WASHOUT

## 2019-09-25 RX ORDER — NALOXONE HYDROCHLORIDE 0.4 MG/ML
.1-.4 INJECTION, SOLUTION INTRAMUSCULAR; INTRAVENOUS; SUBCUTANEOUS
Status: DISCONTINUED | OUTPATIENT
Start: 2019-09-25 | End: 2019-09-25 | Stop reason: HOSPADM

## 2019-09-25 RX ORDER — ONDANSETRON 4 MG/1
4 TABLET, ORALLY DISINTEGRATING ORAL EVERY 30 MIN PRN
Status: DISCONTINUED | OUTPATIENT
Start: 2019-09-25 | End: 2019-09-25 | Stop reason: HOSPADM

## 2019-09-25 RX ADMIN — PHENYLEPHRINE HYDROCHLORIDE 50 MCG: 10 INJECTION INTRAVENOUS at 08:16

## 2019-09-25 RX ADMIN — MIDAZOLAM HYDROCHLORIDE 2 MG: 1 INJECTION, SOLUTION INTRAMUSCULAR; INTRAVENOUS at 07:28

## 2019-09-25 RX ADMIN — KETOROLAC TROMETHAMINE 30 MG: 30 INJECTION, SOLUTION INTRAMUSCULAR at 08:51

## 2019-09-25 RX ADMIN — ROCURONIUM BROMIDE 30 MG: 10 INJECTION INTRAVENOUS at 07:52

## 2019-09-25 RX ADMIN — SODIUM CHLORIDE, POTASSIUM CHLORIDE, SODIUM LACTATE AND CALCIUM CHLORIDE: 600; 310; 30; 20 INJECTION, SOLUTION INTRAVENOUS at 07:22

## 2019-09-25 RX ADMIN — PROPOFOL 200 MG: 10 INJECTION, EMULSION INTRAVENOUS at 07:52

## 2019-09-25 RX ADMIN — PHENYLEPHRINE HYDROCHLORIDE 50 MCG: 10 INJECTION INTRAVENOUS at 08:07

## 2019-09-25 RX ADMIN — DEXMEDETOMIDINE HYDROCHLORIDE 20 MCG: 100 INJECTION, SOLUTION INTRAVENOUS at 07:52

## 2019-09-25 RX ADMIN — GLYCOPYRROLATE 0.6 MG: 0.2 INJECTION, SOLUTION INTRAMUSCULAR; INTRAVENOUS at 09:32

## 2019-09-25 RX ADMIN — DEXAMETHASONE SODIUM PHOSPHATE 4 MG: 4 INJECTION, SOLUTION INTRA-ARTICULAR; INTRALESIONAL; INTRAMUSCULAR; INTRAVENOUS; SOFT TISSUE at 07:57

## 2019-09-25 RX ADMIN — PHENYLEPHRINE HYDROCHLORIDE 50 MCG: 10 INJECTION INTRAVENOUS at 08:36

## 2019-09-25 RX ADMIN — FENTANYL CITRATE 50 MCG: 0.05 INJECTION, SOLUTION INTRAMUSCULAR; INTRAVENOUS at 07:30

## 2019-09-25 RX ADMIN — SODIUM CHLORIDE, POTASSIUM CHLORIDE, SODIUM LACTATE AND CALCIUM CHLORIDE: 600; 310; 30; 20 INJECTION, SOLUTION INTRAVENOUS at 09:07

## 2019-09-25 RX ADMIN — PHENYLEPHRINE HYDROCHLORIDE 50 MCG: 10 INJECTION INTRAVENOUS at 08:57

## 2019-09-25 RX ADMIN — FENTANYL CITRATE 50 MCG: 50 INJECTION, SOLUTION INTRAMUSCULAR; INTRAVENOUS at 09:24

## 2019-09-25 RX ADMIN — NEOSTIGMINE METHYLSULFATE 4 MG: 1 INJECTION, SOLUTION INTRAVENOUS at 09:32

## 2019-09-25 RX ADMIN — CLINDAMYCIN PHOSPHATE 900 MG: 18 INJECTION, SOLUTION INTRAVENOUS at 07:59

## 2019-09-25 RX ADMIN — ONDANSETRON 4 MG: 2 INJECTION INTRAMUSCULAR; INTRAVENOUS at 07:57

## 2019-09-25 RX ADMIN — FENTANYL CITRATE 50 MCG: 50 INJECTION, SOLUTION INTRAMUSCULAR; INTRAVENOUS at 07:34

## 2019-09-25 RX ADMIN — LIDOCAINE HYDROCHLORIDE 60 MG: 20 INJECTION, SOLUTION INFILTRATION; PERINEURAL at 07:52

## 2019-09-25 RX ADMIN — FENTANYL CITRATE 25 MCG: 0.05 INJECTION, SOLUTION INTRAMUSCULAR; INTRAVENOUS at 11:55

## 2019-09-25 RX ADMIN — ONDANSETRON 4 MG: 2 INJECTION INTRAMUSCULAR; INTRAVENOUS at 09:32

## 2019-09-25 RX ADMIN — PROPOFOL 50 MCG/KG/MIN: 10 INJECTION, EMULSION INTRAVENOUS at 07:52

## 2019-09-25 ASSESSMENT — LIFESTYLE VARIABLES: TOBACCO_USE: 0

## 2019-09-25 ASSESSMENT — MIFFLIN-ST. JEOR: SCORE: 1444.42

## 2019-09-25 NOTE — DISCHARGE INSTRUCTIONS
Same Day Surgery Discharge Instructions for  Sedation and General Anesthesia       It's not unusual to feel dizzy, light-headed or faint for up to 24 hours after surgery or while taking pain medication.  If you have these symptoms: sit for a few minutes before standing and have someone assist you when you get up to walk or use the bathroom.      You should rest and relax for the next 24 hours. We recommend you make arrangements to have an adult stay with you for at least 24 hours after your discharge.  Avoid hazardous and strenuous activity.      DO NOT DRIVE any vehicle or operate mechanical equipment for 24 hours following the end of your surgery.  Even though you may feel normal, your reactions may be affected by the medication you have received.      Do not drink alcoholic beverages for 24 hours following surgery.       Slowly progress to your regular diet as you feel able. It's not unusual to feel nauseated and/or vomit after receiving anesthesia.  If you develop these symptoms, drink clear liquids (apple juice, ginger ale, broth, 7-up, etc. ) until you feel better.  If your nausea and vomiting persists for 24 hours, please notify your surgeon.        All narcotic pain medications, along with inactivity and anesthesia, can cause constipation. Drinking plenty of liquids and increasing fiber intake will help.      For any questions of a medical nature, call your surgeon.      Do not make important decisions for 24 hours.      If you had general anesthesia, you may have a sore throat for a couple of days related to the breathing tube used during surgery.  You may use Cepacol lozenges to help with this discomfort.  If it worsens or if you develop a fever, contact your surgeon.       If you feel your pain is not well managed with the pain medications prescribed by your surgeon, please contact your surgeon's office to let them know so they can address your concerns.       Today you received Toradol, an  "antiinflammatory medication similar to Ibuprofen.  You should not take other antiinflammatory medication, such as Ibuprofen, Motrin, Advil, Aleve, Naprosyn, etc until 3pm.     Reasons to contact your surgeon:     1. Elevated temperature.  2. Pain not controlled with pain medication and/or rest.   3. Uncontrolled nausea or vomiting.  4. Any questions or concerns.            [facility]      DISCHARGE INSTRUCTIONS FOLLOWING   REGIONAL BLOCK ANESTHESIA      Numbness or lack of feeling in the arm/leg that was operated may last up to 24 hours.  The average time is usually 10-15 hours.  You may not be able to lift or move the arm or leg where the operation was by itself during that time.  Long-acting local anesthetic medicines were used to give you long-lasting pain relief.    Wear a sling until your arm is completely \"awake\"    Avoid bumping your arm, leg or foot while it is numb    Avoid extremes of hot or cold while it is numb    Remain quiet and restful the day of surgery.  Resume normal activities gradually over the next day or so as advise by your surgeon.    Do not drive or operate  Any machinery until your extremity is full  \"awake\"        You will have a tingling and prickly sensation in your arm/leg as the feeling begins to return; you can also expect some discomfort. The amount of discomfort is unpredictable, but if you have more pain that can be controlled with the pain medication you received, you should contact your surgeon.  Start to take your pain pills as soon as you start to feel any discomfort or pain.        Crutch Instructions      Because of your surgery you will need crutches.  Make sure you tell your healthcare provider if crutches do not seem to work for you.  Using Crutches   Crutches are the most commonly used device for injuries to the lower part of the body because they allow the most mobility. All walking assistance devices require strength in your upper body but crutches require " "more coordination. If you are unable to use crutches then a cane or walker may be better.   Remember these rules when using crutches:   Always look straight ahead when you walk. Do not look down.   Hold the top padded part of the crutches into your chest near your armpits. Grasp the padded hand  and always support your weight with your arms and hands.   Do not lean on the crutches with your armpit as this could cause nerve damage.  Standing Up  Make sure you are in a stable chair. Move forward to the edge of the chair so that your  good  foot is flat on the floor. Put both crutches together and hold the handgrips in one hand. Stretch the injured leg out straight and then use the crutches with one hand and the chair armrest with the other hand to push yourself into a standing position onto your  good  leg. Once you are standing, wait until you are steady before placing a crutch in each hand. Until you become good at standing, have someone assist you in case you lose your balance. When standing still, lean slightly forward with the crutches ahead of you and about 3 feet apart. Unless you are told otherwise, you should keep weight off your injured leg.  Walking  To begin crutch walking, balance yourself on your  good  leg. Move both crutches forward about the length of one step and place them firmly on the floor in front of you about 3 feet apart. Support your weight on the padded hand rests while leaning forward. Press the top of the crutches against your chest wall and not into your armpits. Be sure to hold the injured leg up off of the floor. When ready, swing the \"good\" leg forward about one step length past the crutches while supporting your weight on the padded hand . Be careful not to go too far. Transfer your weight onto the \"good\" leg then bring both crutches forward about the length of one step. Repeating this motion will allow you to move fairly quickly without the use of your injured leg. Keep " "practicing until you become good at crutch walking.  Sitting Down  Make sure the chair you are about to sit on is stable. Walk in front of the chair such that you are facing away from it. Move back a little at a time until the back of your  good  leg is nearly touching the seat. Keeping your weight on the  good  leg, move both crutches to one hand holding onto the handgrips. Lean forward, bend your  good  knee, and move your injured leg out forward. Sit down slowly while using your free hand to reach for the chair s armrest for support. Place the crutches where you can easily get them. Never pivot, even on your  good  leg. This could cause you to fall or injure your  good  leg.  Navigating Stairs, Curbs & Door Steps  Only attempt this once you are very comfortable with regular crutch walking and only when someone is there to steady you should you begin to lose your balance. Hold on to a  railing with one hand and both crutches in the other hand or have someone carry the loose crutch for you. It does not matter which side the handrail is on. If there is no handrail, you can use both crutches. Using only crutches is the same as the railing method but maintaining your balance can be difficult. The crutch-only method is not recommended until you have become very good at crutch walking. Be sure to only take one step at a time. A simple rule to remember for crutches when navigating stairs or curbs: up with the  good  leg and down with the  bad .  Going Up Stairs or Curbs  Walk close to the first step with the crutches slightly behind you. Push down on the handrail and the crutch and step up with the \"good\" leg. You may have to make a short hop to do this if you are not allowed to place any weight on the injured leg. Lean forward and bring the injured leg and the crutch up beside the \"good\" leg. Repeat this until you have climbed up all of the steps. Remember, the \"good\" leg goes up first and the crutches move " "with the injured leg. The person helping you should stand behind and to your side that is away from the railing.  Going Down Stairs or Curbs  Walk to the edge of the first step. While standing and balancing on the  good  leg, place both crutches in one hand and then down on the step below. Be sure to support your weight by leaning on the crutches and handrail. Bring the injured leg forward, then the \"good\" leg down to the same step as the crutches. Remember crutches go down first with the injured leg. The person helping you should  front and to your side that is away from the railing.  Sitting Method for Navigating Stairs  A safer way to get up and down stairs is to sit down. To go up steps, sit down on the second or third step. Push with your  good  leg below while pushing up with both arms on the step above to move your bottom to the next step. When you get to the top of the stairs you may need to use the  scooting  method described later to get back up. To go down steps you first need to lower yourself to the floor near the top of the steps. Once seated, support yourself with your  good  leg on the second to next step below, and push with both arms on the step where you are sitting to move your bottom down to the next step. When you reach the bottom, pull yourself up to standing position using your crutches. It is helpful if someone can move your crutches and assist you in getting up and down. With practice it may be possible to manage on your own.  If You Start To Fall  If you start to fall and cannot get your balance, throw the crutches away from you. Try to fall onto your  good  side away from your injury and then break your fall with your arms. If uninjured, you can usually get back up by moving into a sitting position and scooting to a chair. Push with your arms and hands on the chair seat while pushing with the \"good\" leg to get up into the chair. You should not attempt to get back up if you are " having significant pain. Call for assistance or dial 911 for an ambulance if necessary.  Safety Tips for Crutch Walking   At first you may want to wear a training belt (or a strong regular belt) so others can assist you.   Do not use crutches if you feel dizzy or drowsy.   Be careful on slick or wet surfaces like a kitchen or bathroom floor, snow, ice, or rainy conditions.   Temporarily remove pets, throw rugs or other items from your home that might trip you.   Do not hop around while holding onto furniture.   Wear sneakers or rubber soled shoes that will not easily slip or come off.   Be careful on ramps or slopes as they can be harder to walk up or down   Do not remove any parts from your crutches especially the padding or rubber traction footings. Replace padding or footings that become damaged immediately.   It is important to use your crutches correctly. If you feel any numbness or tingling in your arms, you are probably using the crutches incorrectly.  Helpful Hints   A bedside toilet or toilet riser may be helpful.   Always allow for extra time to get around. Children in school should be given permission to leave class early to avoid crowds when changing classes.   Elevate the injured leg when sitting.   Use a backpack to carry books or waist pouch to carry other items so that both hands are free.   Call your healthcare provider if you have any questions or concerns.                        **If you have questions or concerns about your procedure,   call Dr. Johnson at [phone]**

## 2019-09-25 NOTE — ANESTHESIA PROCEDURE NOTES
Peripheral nerve/Neuraxial procedure note : Popliteal and Sciatic  Pre-Procedure  Performed by Tyree To MD  Location: pre-op      Pre-Anesthestic Checklist: patient identified, IV checked, site marked, risks and benefits discussed, informed consent, monitors and equipment checked, pre-op evaluation, at physician/surgeon's request and post-op pain management    Timeout  Correct Patient: Yes   Correct Procedure: Yes   Correct Site: Yes   Correct Laterality: Yes   Correct Position: Yes   Site Marked: Yes   .   Procedure Documentation    .    Procedure: Popliteal and Sciatic, left.   Patient Position:supine Local skin infiltrated with 2 mL of 1% lidocaine.    Ultrasound used to identify targeted nerve, plexus, or vascular marker and placed a needle adjacent to it., Ultrasound was used to visualize the spread of the anesthetic in close proximity to the above stated nerve. A permanent image is entered into the patient's record.  Patient Prep/Sterile Barriers; mask, sterile gloves, chlorhexidine gluconate and isopropyl alcohol, patient draped.  .  Needle: other   Needle Gauge: 22.  Needle Length (millimeters) 100  Insertion Method: Single Shot.        Assessment/Narrative  Paresthesias: No.  .  The placement was negative for: blood aspirated, painful injection and site bleeding.  Bolus given via needle..   Secured via.   Complications: none. Comments:  Bupivacaine 0.5% with 1:400,000 epi 10ml   Patient sedated but commutative throughout the procedure.   Patient tolerated well.    The surgeon has given a verbal order transferring this pt to me for a performance of regional analgesia block for post op pain control. It is requested of me because I am trained and qualifed to perform this block and the surgeon is nether trained nor qualified to perform this procedure.

## 2019-09-25 NOTE — ANESTHESIA PROCEDURE NOTES
Peripheral nerve/Neuraxial procedure note : Adductor canal and Saphenous  Pre-Procedure  Performed by Tyree To MD  Location: pre-op      Pre-Anesthestic Checklist: patient identified, IV checked, site marked, risks and benefits discussed, informed consent, monitors and equipment checked, pre-op evaluation, at physician/surgeon's request and post-op pain management    Timeout  Correct Patient: Yes   Correct Procedure: Yes   Correct Site: Yes   Correct Laterality: Yes   Correct Position: Yes   Site Marked: Yes   .   Procedure Documentation    .    Procedure: Adductor canal and Saphenous, left.   Patient Position:supine Local skin infiltrated with 2 mL of 1% lidocaine.    Ultrasound used to identify targeted nerve, plexus, or vascular marker and placed a needle adjacent to it., Ultrasound was used to visualize the spread of the anesthetic in close proximity to the above stated nerve. A permanent image is entered into the patient's record.  Patient Prep/Sterile Barriers; mask, sterile gloves, chlorhexidine gluconate and isopropyl alcohol, patient draped.  .  Needle: other   Needle Gauge: 22.  Needle Length (millimeters) 100  Insertion Method: Single Shot.        Assessment/Narrative  Paresthesias: No.  .  The placement was negative for: blood aspirated, painful injection and site bleeding.  Bolus given via needle..   Secured via.   Complications: none. Comments:  Bupivacaine 0.5% with 1:200,000 epi 20ml   Patient sedated but commutative throughout the procedure.   Patient tolerated well.    The surgeon has given a verbal order transferring this pt to me for a performance of regional analgesia block for post op pain control. It is requested of me because I am trained and qualifed to perform this block and the surgeon is nether trained nor qualified to perform this procedure.

## 2019-09-25 NOTE — BRIEF OP NOTE
[facility]    Brief Operative Note    Pre-operative diagnosis: LEFT ANKLE INSTABILITY  Post-operative diagnosis sp ankle arthroscopy and lateral ankle stabiliation  Procedure: Procedure(s):  LEFT ANKLE ARTHROSCOPY WITH LIGAMENT REPAIR  Surgeon: Surgeon(s) and Role:     * Andres Johnson DPM - Primary  Anesthesia: Combined General with Popliteal Block   Estimated blood loss: Minimal  Drains: None  Specimens:   ID Type Source Tests Collected by Time Destination   1 : Left Ankle joint  fluid Fluid Ankle GRAM STAIN Andres Johnson DPM 9/25/2019  8:15 AM    A : tissue Left ankle Tissue Ankle SURGICAL PATHOLOGY EXAM Andres Johnson DPM 9/25/2019  8:54 AM      Findings:   no evidence of residual deep infection. STAT Gram stain neg for organisms.  .  Complications: None.  Implants:    Implant Name Type Inv. Item Serial No.  Lot No. LRB No. Used   IMP KIT REPAIR LIGAMENT AUGMENTATION INT BRACE [id number] Metallic Hardware/[employer] IMP KIT REPAIR LIGAMENT AUGMENTATION INT BRACE [id number]  ARTHREX  Left 1

## 2019-09-25 NOTE — ANESTHESIA PREPROCEDURE EVALUATION
Anesthesia Pre-Procedure Evaluation    Patient: Samara Oropeza   MRN: [id number] : 1995          Preoperative Diagnosis: LEFT ANKLE INSTABILITY    Procedure(s):  LEFT ANKLE ARTHROSCOPY WITH LIGAMENT REPAIR (ARTHREX INTERNAL BRACE, ARTHROBROSTRUM SET, MINI C-ARM)  (SUPINE POSITIONING) (GENERAL WITH POPLITEAL AND SAPHENOUS ANESTHESIA)    Past Medical History:   Diagnosis Date     Anemia      Anxiety      Arthritis      Behcet's disease (H)      Cervical adenitis May 2010     Chronic abdominal pain      Constipation, chronic      Fibromyalgia      Gastro-oesophageal reflux disease      Gastroparesis      Irregular heart beat     tachycardia, has had workup     Migraines      Neuromuscular disorder (H)     fibramyalgia     Palpitations      PONV (postoperative nausea and vomiting)      Seizure (H)      Seizures (H)     unknown etiology     Syncope      Tourette's      Past Surgical History:   Procedure Laterality Date     ARTHROSCOPY ANKLE, REPAIR LIGAMENT Left 2019    Procedure: Ankle arthroscopy and sinus tarsi evacuation, ligament repair, left lower extremity;  Surgeon: Andres Johnson DPM;  Location:  OR     ARTHROSCOPY KNEE WITH PATELLAR REALIGNMENT  2013    Procedure: ARTHROSCOPY KNEE WITH PATELLAR REALIGNMENT;  Left Knee Arthroscopy, Medial Patellofemoral Ligament Reconstruction with Allograft  ;  Surgeon: Jennifer Acevedo MD;  Location: [facility]     COLONOSCOPY       DENTAL SURGERY      Teeth removal     ENDOSCOPY UPPER, COLONOSCOPY, COMBINED       HC ESOPH/GAS REFLUX TEST W NASAL IMPED >1 HR N/A 2/15/2017    Procedure: ESOPHAGEAL IMPEDENCE FUNCTION TEST WITH 24 HOUR PH GREATER THAN 1 HOUR;  Surgeon: Timothy Matta MD;  Location: [facility]     IR PICC PLACEMENT > 5 YRS OF AGE  3/13/2019     IRRIGATION AND DEBRIDEMENT FOOT, COMBINED Left 3/12/2019    Procedure: COMBINED IRRIGATION AND DEBRIDEMENT LEFT ANKLE;  Surgeon: Micha Glover MD;  Location:   OR     IRRIGATION AND DEBRIDEMENT LOWER EXTREMITY, COMBINED Left 5/7/2019    Procedure: 1.  Excision of wound down to and including deep fascia, less than 20 cm2.  2.  Irrigation and debridement, left ankle.;  Surgeon: Andres Johnson DPM;  Location: [facility] OR     PICC INSERTION Left 05/05/2019    4Fr - 45cm (5cm external), Basilic vein, SVC RA junction       Anesthesia Evaluation     . Pt has had prior anesthetic.     History of anesthetic complications   - PONV        ROS/MED HX    ENT/Pulmonary:      (-) tobacco use and sleep apnea   Neurologic:     (+)migraines,    (-) CVA   Cardiovascular:     (+) ----. : . . fainting (syncope). :. Irregular Heartbeat/Palpitations, .       METS/Exercise Tolerance:  >4 METS   Hematologic:         Musculoskeletal:   (+) arthritis (RA),  -       GI/Hepatic:     (+) GERD Symptomatic,       Renal/Genitourinary:      (-) renal disease   Endo:      (-) Type II DM and thyroid disease   Psychiatric:     (+) psychiatric history anxiety and depression (PTSD, tourettes)      Infectious Disease:         Malignancy:         Other:    (+) H/O Chronic Pain,                        Physical Exam  Normal systems: cardiovascular, pulmonary and dental    Airway   Mallampati: II  TM distance: >3 FB  Neck ROM: full    Dental     Cardiovascular       Pulmonary             Lab Results   Component Value Date    WBC 5.4 09/17/2019    HGB 11.3 (L) 09/17/2019    HCT 35.5 09/17/2019     09/17/2019    CRP <2.9 05/21/2019    SED 8 05/21/2019     09/17/2019    POTASSIUM 3.6 09/17/2019    CHLORIDE 109 09/17/2019    CO2 20 09/17/2019    BUN 8 09/17/2019    CR 0.72 09/17/2019    GLC 87 09/17/2019    SHANKAR 9.4 09/17/2019    MAG 1.9 04/01/2019    ALBUMIN 3.6 09/17/2019    PROTTOTAL 6.9 09/17/2019    ALT 23 09/17/2019    AST 25 09/17/2019    GGT 34 (H) 10/19/2013    ALKPHOS 60 09/17/2019    BILITOTAL 0.5 09/17/2019    LIPASE 106 03/30/2019    AMYLASE 63 01/31/2007    INR 0.99 11/06/2016    TSH 0.53  "03/21/2019    T4 1.26 01/31/2007    HCG Negative 09/25/2019    HCGS Negative 06/02/2017       Preop Vitals  BP Readings from Last 3 Encounters:   09/25/19 (!) 120/91   09/17/19 110/62   09/10/19 112/80    Pulse Readings from Last 3 Encounters:   09/17/19 100   09/10/19 88   07/25/19 101      Resp Readings from Last 3 Encounters:   09/25/19 18   07/25/19 14   07/24/19 16    SpO2 Readings from Last 3 Encounters:   09/25/19 99%   09/17/19 99%   07/25/19 99%      Temp Readings from Last 1 Encounters:   09/25/19 36.4  C (97.5  F) (Temporal)    Ht Readings from Last 1 Encounters:   09/25/19 1.6 m (5' 3\")      Wt Readings from Last 1 Encounters:   09/25/19 73 kg (161 lb)    Estimated body mass index is 28.52 kg/m  as calculated from the following:    Height as of this encounter: 1.6 m (5' 3\").    Weight as of this encounter: 73 kg (161 lb).       Anesthesia Plan      History & Physical Review  History and physical reviewed and following examination; no interval change.    ASA Status:  2 .    NPO Status:  > 8 hours    Plan for General and LMA with Intravenous induction. Maintenance will be Balanced.    PONV prophylaxis:  Ondansetron (or other 5HT-3) and Dexamethasone or Solumedrol       Postoperative Care  Postoperative pain management:  Peripheral nerve block (Single Shot) and Multi-modal analgesia.      Consents  Anesthetic plan, risks, benefits and alternatives discussed with:  Patient and Parent (Mother and/or Father)..                 Tyree To MD  "

## 2019-09-25 NOTE — PROGRESS NOTES
S: We received an order in [facility] for a prefabricated aluminum splint PFS splint (TAFO) for post operative surgical procedure immobilization.  OG: Splint LLE.  A: I have donned a size med TAFO on the LLE. I have explained to the Pt and her mother how to qamar and doff. The TAFO is splinting immobilizing the LLE  providing triplanar control restricing motion in the sagittal, coronal and transverse planes.  P: Pt will contact us prn.  Vijay CROWLEY LO

## 2019-09-25 NOTE — ANESTHESIA CARE TRANSFER NOTE
Patient: Samara Oropeaz    Procedure(s):  LEFT ANKLE ARTHROSCOPY WITH LIGAMENT REPAIR    Diagnosis: LEFT ANKLE INSTABILITY  Diagnosis Additional Information: No value filed.    Anesthesia Type:   General, LMA     Note:  Airway :Face Mask  Patient transferred to:PACU  Comments: Neuromuscular blockade reversed after TOF 4/4, spontaneous respirations, adequate tidal volumes, followed commands to voice, oropharynx suctioned with soft flexible catheter, extubated atraumatically, extubated with suction, airway patent after extubation.  Oxygen via facemask at 6 liters per minute to PACU. Oxygen tubing connected to wall O2 in PACU, SpO2, NiBP, and EKG monitors and alarms on and functioning, Nabila Hugger warmer connected to patient gown, report on patient's clinical status given to PACU RN, RN questions answered. Handoff Report: Identifed the Patient, Identified the Reponsible Provider, Reviewed the pertinent medical history, Discussed the surgical course, Reviewed Intra-OP anesthesia mangement and issues during anesthesia, Set expectations for post-procedure period and Allowed opportunity for questions and acknowledgement of understanding      Vitals: (Last set prior to Anesthesia Care Transfer)    CRNA VITALS  9/25/2019 0923 - 9/25/2019 0958      9/25/2019             NIBP: 122/75    NIBP Mean:  118                Electronically Signed By: EVI Simon CRNA  September 25, 2019  9:59 AM

## 2019-09-25 NOTE — PROGRESS NOTES
Pts eye glasses retained by Pts Mother.  Body jewelery (nose & ear ring piercings retained insitu - tape applied)

## 2019-09-25 NOTE — ANESTHESIA POSTPROCEDURE EVALUATION
Patient: Samara Oropeza    Procedure(s):  LEFT ANKLE ARTHROSCOPY WITH LIGAMENT REPAIR    Diagnosis:LEFT ANKLE INSTABILITY  Diagnosis Additional Information: No value filed.    Anesthesia Type:  General, LMA    Note:  Anesthesia Post Evaluation    Patient location during evaluation: PACU  Patient participation: Able to fully participate in evaluation  Level of consciousness: awake and alert  Pain management: satisfactory to patient  Airway patency: patent  Cardiovascular status: hemodynamically stable  Respiratory status: acceptable and unassisted  Hydration status: balanced  PONV: none     Anesthetic complications: None          Last vitals:  Vitals:    09/25/19 1200 09/25/19 1230 09/25/19 1245   BP: 114/74 113/79 104/73   Pulse:      Resp: 14 14 14   Temp:      SpO2: 99% 99% 99%         Electronically Signed By: Tyree To MD  September 25, 2019  2:36 PM

## 2019-09-25 NOTE — OR NURSING
PNDS met, po per I&O sheet. Pt dressed, up in recliner and transported to Phase 2.  Patient up to restroom and voided without difficulty.

## 2019-09-25 NOTE — OR NURSING
In phase 2 c/o pain 6/10-Dr luo called- admin 25 mcg fentanyl IV X1 pr TORB- effective pain control-  Up to BR voids cl yellow X2- Orthotics in room - splint fitted- AOX3-VSS-O2 sats >92% RA- Good PO intake, pain tolerable 3/10-- Pt and responsible adult verbalize understanding of discharge instructions, denies questions. Up in W/C - transported to door for discharge to home by staff- Nurse Aidhedy Moore per hospital Nerve Block policy.

## 2019-09-25 NOTE — OP NOTE
Procedure Date: 09/25/2019      SURGEON:  Andres Johnson DPM.      PREOPERATIVE DIAGNOSES:  Unstable left ankle, status post previous ankle arthroscopy and open lateral ankle stabilization with subsequent infection and two I and Ds.      POSTOPERATIVE DIAGNOSIS:  Unstable left ankle, status post previous ankle arthroscopy and open lateral ankle stabilization with subsequent infection and two I and Ds.      PROCEDURES:   1.  Left ankle arthroscopy with synovectomy.   2.  Open anterior talofibular ligament repair.      PATHOLOGY:   1.  A stat Gram stain was achieved prior to the ankle arthroscopy and the ligament repair.  This was negative for any bacteria and so we proceeded with the surgery.   2.  The patient had abnormal subcutaneous tissue at the lateral aspect of the ankle that was sent off for histological evaluation.      ANESTHESIA:  General endotracheal with popliteal and saphenous nerve blocks.      COMPLICATIONS:  None apparent.      INDICATIONS FOR PROCEDURE:  The patient is a pleasant 24-year-old female who has been seen clinically for some time.  Initially I saw for an unstable left ankle.  She underwent ankle arthroscopy and lateral ankle stabilization.  Unfortunately, she developed an infection and was seen in an outside hospital where an I and D was performed by an orthopedic surgeon.  He indicated that he had to resect the anterior talofibular ligament and the patient had gross instability.  She was hospitalized twice for this.  An MRI was negative for osteomyelitis.  I ended up taking her back to the operating room for a revision I and D.  She is on a long extended course of IV antibiotics per our Infectious Disease Department.  We had discussed revision stabilization once we were confident the infection had been resolved.  We discussed arthroscopy and lateral ankle stabilization, but also doing a swab of the ankle with a stat Gram stain to assess the presence of any bacteria.      DESCRIPTION  OF PROCEDURE:  After obtaining written consent, the patient was transferred to the operating room, placed in the supine position on the operating room table.  She received preoperative popliteal and saphenous nerve blocks by anesthesia prior to the procedure.  She was placed under general anesthesia.  The left lower extremity was then prepped and draped in a normal aseptic fashion, exsanguinated and the well-padded pneumatic thigh cuff was inflated.      Attention was directed to the anterior ankle where anteromedial and anterolateral arthroscopy portals were created through the previous incision.  Blunt dissection was used to carry the incision down to the joint capsule, which was entered bluntly.  Placing a swab through the cannula in hopes of avoiding any skin contamination, a swab was obtained of the ankle joint and sent off for a stat Gram stain.  While this was being processed, I performed the ankle arthroscopy portion of the procedure.  There was mild acute and chronic synovitis throughout the joint, which was debrided.  No purulence or other signs of infection were noted.  I ran approximately 2-1/2 liters of normal saline through the joint.  The results of the stat Gram stain showed no bacteria.  We elected to proceed with the open lateral ankle stabilization.  The incision was carried down through the previous lateral ankle incision down to subcutaneous tissue.  The patient had abnormal foamy appearing subcutaneous tissue.  This was debrided in total and sent off for histological evaluation.  I also debrided down into the sinus tarsi.  This appeared to be one of the main areas that the fluid collection was coming from.  No specific ganglion cyst was noted.  The soft tissue was reflected off of the anterior fibula and the lateral nonarticular wall of the talus.  Using proprietary techniques, the lateral ankle stabilization was performed with the Arthrex internal brace with the ankle in neutral and heel  slightly everted.  There was significant improvement in stability with both the anterior drawer and the talar tilt examination.  I elected to further augment this with 2 throws of 0 FiberWire in between the ATFL and the CFL.  Again, excellent stability was noted within the ankle joint.  The wound was flushed with copious amounts of normal saline.  Layered closure was performed with 4-0 Vicryl and 4-0 nylon.      A dry sterile dressing was applied to the left lower extremity.  She appeared to tolerate the procedure and anesthesia well and was transferred to the PACU with vital signs stable and vascular status intact to the foot.      PLAN:  The patient will be nonweightbearing and will follow up with me in clinic in approximately 1 week or sooner with any acute issues.         GREG CRISOSTOMO DPM             D: 2019   T: 2019   MT: MAY      Name:     LUCINA BAH   MRN:      [id number]        Account:        [id number]   :      1995           Procedure Date: 2019      Document: [id number]

## 2019-09-26 ENCOUNTER — TELEPHONE (OUTPATIENT)
Dept: PODIATRY | Facility: CLINIC | Age: 24
End: 2019-09-26

## 2019-09-26 ENCOUNTER — APPOINTMENT (OUTPATIENT)
Dept: ULTRASOUND IMAGING | Facility: CLINIC | Age: 24
End: 2019-09-26
Attending: EMERGENCY MEDICINE
Payer: COMMERCIAL

## 2019-09-26 ENCOUNTER — HOSPITAL ENCOUNTER (EMERGENCY)
Facility: CLINIC | Age: 24
Discharge: HOME | End: 2019-09-26
Attending: EMERGENCY MEDICINE | Admitting: EMERGENCY MEDICINE
Payer: COMMERCIAL

## 2019-09-26 VITALS
DIASTOLIC BLOOD PRESSURE: 72 MMHG | TEMPERATURE: 97.8 F | OXYGEN SATURATION: 99 % | SYSTOLIC BLOOD PRESSURE: 112 MMHG | WEIGHT: 157 LBS | HEART RATE: 87 BPM | BODY MASS INDEX: 27.81 KG/M2 | RESPIRATION RATE: 14 BRPM

## 2019-09-26 DIAGNOSIS — T78.40XA ALLERGIC REACTION, INITIAL ENCOUNTER: ICD-10-CM

## 2019-09-26 DIAGNOSIS — R22.1 THROAT SWELLING: ICD-10-CM

## 2019-09-26 LAB
ANION GAP SERPL CALCULATED.3IONS-SCNC: 8 MMOL/L (ref 3–14)
BASOPHILS # BLD AUTO: 0 10E9/L (ref 0–0.2)
BASOPHILS NFR BLD AUTO: 0.1 %
BUN SERPL-MCNC: 6 MG/DL (ref 7–30)
CALCIUM SERPL-MCNC: 9.1 MG/DL (ref 8.5–10.1)
CHLORIDE SERPL-SCNC: 105 MMOL/L (ref 94–109)
CO2 SERPL-SCNC: 23 MMOL/L (ref 20–32)
COPATH REPORT: NORMAL
CREAT SERPL-MCNC: 0.72 MG/DL (ref 0.52–1.04)
DIFFERENTIAL METHOD BLD: ABNORMAL
EOSINOPHIL # BLD AUTO: 0.1 10E9/L (ref 0–0.7)
EOSINOPHIL NFR BLD AUTO: 0.9 %
ERYTHROCYTE [DISTWIDTH] IN BLOOD BY AUTOMATED COUNT: 14.1 % (ref 10–15)
GFR SERPL CREATININE-BSD FRML MDRD: >90 ML/MIN/{1.73_M2}
GLUCOSE SERPL-MCNC: 113 MG/DL (ref 70–99)
HCT VFR BLD AUTO: 38.6 % (ref 35–47)
HGB BLD-MCNC: 11.6 G/DL (ref 11.7–15.7)
IMM GRANULOCYTES # BLD: 0 10E9/L (ref 0–0.4)
IMM GRANULOCYTES NFR BLD: 0.1 %
LYMPHOCYTES # BLD AUTO: 2.5 10E9/L (ref 0.8–5.3)
LYMPHOCYTES NFR BLD AUTO: 31.6 %
MCH RBC QN AUTO: 26.9 PG (ref 26.5–33)
MCHC RBC AUTO-ENTMCNC: 30.1 G/DL (ref 31.5–36.5)
MCV RBC AUTO: 90 FL (ref 78–100)
MONOCYTES # BLD AUTO: 0.5 10E9/L (ref 0–1.3)
MONOCYTES NFR BLD AUTO: 6.2 %
NEUTROPHILS # BLD AUTO: 4.8 10E9/L (ref 1.6–8.3)
NEUTROPHILS NFR BLD AUTO: 61.1 %
NRBC # BLD AUTO: 0 10*3/UL
NRBC BLD AUTO-RTO: 0 /100
PLATELET # BLD AUTO: 275 10E9/L (ref 150–450)
POTASSIUM SERPL-SCNC: 3.6 MMOL/L (ref 3.4–5.3)
RBC # BLD AUTO: 4.31 10E12/L (ref 3.8–5.2)
SODIUM SERPL-SCNC: 136 MMOL/L (ref 133–144)
WBC # BLD AUTO: 7.9 10E9/L (ref 4–11)

## 2019-09-26 PROCEDURE — 93971 EXTREMITY STUDY: CPT | Mod: LT

## 2019-09-26 PROCEDURE — 25000132 ZZH RX MED GY IP 250 OP 250 PS 637: Performed by: EMERGENCY MEDICINE

## 2019-09-26 PROCEDURE — 94640 AIRWAY INHALATION TREATMENT: CPT

## 2019-09-26 PROCEDURE — 85025 COMPLETE CBC W/AUTO DIFF WBC: CPT | Performed by: EMERGENCY MEDICINE

## 2019-09-26 PROCEDURE — 99285 EMERGENCY DEPT VISIT HI MDM: CPT | Mod: 25

## 2019-09-26 PROCEDURE — 51798 US URINE CAPACITY MEASURE: CPT

## 2019-09-26 PROCEDURE — 96374 THER/PROPH/DIAG INJ IV PUSH: CPT

## 2019-09-26 PROCEDURE — 25000128 H RX IP 250 OP 636: Performed by: EMERGENCY MEDICINE

## 2019-09-26 PROCEDURE — 80048 BASIC METABOLIC PNL TOTAL CA: CPT | Performed by: EMERGENCY MEDICINE

## 2019-09-26 RX ORDER — DEXAMETHASONE SODIUM PHOSPHATE 10 MG/ML
10 INJECTION, SOLUTION INTRAMUSCULAR; INTRAVENOUS ONCE
Status: COMPLETED | OUTPATIENT
Start: 2019-09-26 | End: 2019-09-26

## 2019-09-26 RX ADMIN — RACEPINEPHRINE HYDROCHLORIDE 0.5 ML: 11.25 SOLUTION RESPIRATORY (INHALATION) at 12:14

## 2019-09-26 RX ADMIN — DEXAMETHASONE SODIUM PHOSPHATE 10 MG: 10 INJECTION, SOLUTION INTRAMUSCULAR; INTRAVENOUS at 12:09

## 2019-09-26 ASSESSMENT — ENCOUNTER SYMPTOMS
ARTHRALGIAS: 1
SHORTNESS OF BREATH: 1
TROUBLE SWALLOWING: 1
DIFFICULTY URINATING: 1
JOINT SWELLING: 1

## 2019-09-26 NOTE — ED AVS SNAPSHOT
[facility] Emergency Department  [address]  [facility] [address]  Phone:  [phone]  Fax:  [phone]                                    Samara Oropeza   MRN: [id number]    Department:  [facility] Emergency Department   Date of Visit:  9/26/2019           After Visit Summary Signature Page    I have received my discharge instructions, and my questions have been answered. I have discussed any challenges I see with this plan with the nurse or doctor.    ..........................................................................................................................................  Patient/Patient Representative Signature      ..........................................................................................................................................  Patient Representative Print Name and Relationship to Patient    ..................................................               ................................................  Date                                   Time    ..........................................................................................................................................  Reviewed by Signature/Title    ...................................................              ..............................................  Date                                               Time          22EPIC Rev 08/18

## 2019-09-26 NOTE — ED PROVIDER NOTES
History     Chief Complaint:  Post-op Problem      HPI   Samara Oropeza is a 24 year old female s/p left ankle arthroscopy with ligament repair on 9/25/19, who presents with post operative complications. The patient reports of breathing difficulty beginning last night and difficulty swallowing, an itchy rash to her upper body, legs, and face, and difficulty urinating beginning this morning. The patient also complains of left arm swelling and pain from the IV placed yesterday, noting that her IV infiltrated with fluid and fentanyl at this time. She reports that her left arm swelling has decreased today compared to yesterday. She states that she took 3-4 pills of benadryl, 2 hydroxyzine, and 1 Zofran prior to coming into the ED.     Allergies:  Amoxil  Contrast dye  Reglan  Ace inhibitors  Amoxicillin-Pot Clavulanate  Tizanidine  Versed  Adhesive tape  Azithromycin  Cephalexin     Medications:    Albuterol  Elavil  Aspirin  Dulcolax  Periactin  Neurontin  Atarax  Ativan  Prilosec  Zofran  Phenergan  Zantac  Carafate    Past Medical History:    Anemia  Anxiety  Arthritis  Behcet's disease  Cervical adenitis  Chronic abdominal pain  Constipation  Fibromyalgia  GERD  Migraines  Neuromuscular disorder  Palpitations  PONV  Seizure  Syncope  Tourette's  IBS  RA  PTSD  Somatic symptom disorder  Enthesopathy of hip region  Raynaud's disease without gangrene    Past Surgical History:    Arthroscopy ankle (2x)  Arthroscopy knee  Colonoscopy  Teeth removal   Upper endoscopy  IR PICC placement  I&D (2x)    Family History:    Depression  Migraines  Alcohol/drug  CVD  Alzheimer    Social History:  Smoking status: no   Alcohol use: yes   Drug use: yes, marijuana  PCP: Sonja Abreu  Marital Status:  Single      Review of Systems   HENT: Positive for trouble swallowing.    Respiratory: Positive for shortness of breath.    Genitourinary: Positive for difficulty urinating.   Musculoskeletal: Positive for arthralgias and  joint swelling.   Skin: Positive for rash.   All other systems reviewed and are negative.        Physical Exam     Patient Vitals for the past 24 hrs:   BP Temp Temp src Pulse Resp SpO2 Weight   09/26/19 1230 112/72 -- -- 87 -- 99 % --   09/26/19 1221 113/83 -- -- 107 -- 98 % --   09/26/19 1112 (!) 127/93 97.8  F (36.6  C) Oral 114 14 100 % 71.2 kg (157 lb)        Physical Exam  Gen: well appearing, in no acute distress  HEENT:  mmm, no rhinorrhea, no visible intraoral swelling  Neck: supple, no abnormal swelling, no stridor  Lungs:  CTAB,  no resp distress  CV: rrr, no m/r/g, ppi  Abd: soft, nontender, nondistended, no rebound/masses/guarding/hsm  Ext: no peripheral edema  Skin: Blanching erythematous lesions on the upper chest and lower portions of the neck.  Neuro: alert, MAEE, no gross motor or sensory deficits, gait stable  Psych: Normal mood, normal affect      Emergency Department Course     Imaging:  Radiographic findings were communicated with the patient who voiced understanding of the findings.    US Upper Extremity Venous Duplex Left  No evidence for DVT.  As read by Radiology.        CBC: WBC 7.9, HGB 11.6 (L),    BMP: Glucose 113 (H), urea 6 (L), o/w WNL (Creatinine: 0.72)     Interventions:  1209: dexamethasone PF (DECADRON) injection 10 mg IV  1214: racEPINEPHrine neb solution 0.5 mL nebulization    Emergency Department Course:  1128: Nursing notes and vitals reviewed. I performed an exam of the patient as documented above.     IV inserted. Medicine administered as documented above. Blood drawn. This was sent to the lab for further testing, results above. The patient provided a urine sample here in the emergency department. This was sent for laboratory testing, findings above.      The patient was sent for an upper extremity ultrasound while in the emergency department, findings above.     1349: I rechecked the patient and discussed the results of her workup thus far.     Findings and plan  explained to the Patient. Patient discharged home with instructions regarding supportive care, medications, and reasons to return. The importance of close follow-up was reviewed.     I personally reviewed the laboratory results with the Patient and answered all related questions prior to discharge.     Impression & Plan      Medical Decision Makin 4-year-old female 1 day postop from a left ankle tendon repair here with multiple concerns including allergic reaction and urinary retention.  Also has a concern with a left arm where IV was swelling and pain.  No significant intraoral swelling here no stridor or lower respiratory tract findings she has no GI findings and the skin changes are quite mild.  She has taken a number of antihistamines at home single dose of Decadron here and a neb and she felt better.  I do not see indication for intramuscular epinephrine.  I think she is safe and stable for discharge home in terms of an allergic reaction/angioedema.  Suspect most of which she is feeling as a soreness from the endotracheal tube.  The left arm was negative for DVT.  And her postvoid residual was less than 100 which is certainly within normal.  She states that she has a prescription for urinary tract infection from her primary care provider she plans to  take.        Critical Care time:  none    Diagnosis:    ICD-10-CM    1. Throat swelling R22.1    2. Allergic reaction, initial encounter T78.40XA        Disposition:  discharged to home    Alexia ROJO, am serving as a scribe at 11:28 AM on 2019 to document services personally performed by Bo Moy MD based on my observations and the provider's statements to me.     Alexia Reeder  2019   [facility] EMERGENCY DEPARTMENT       Bo Moy MD  19 [phone]

## 2019-09-26 NOTE — TELEPHONE ENCOUNTER
Samara Oropeza is a 24 year old female.    Call was transferred from [facility] triage. Patient had surgery yesterday by Dr. Johnson .   She complains of hives on face, chest, legs, hands, and torso today. They are small raised bumps that are itchy. She also is having swelling in the back of the right side of her throat that she feels is related to intubation. Has been there since yesterday and is staying the same.   She took Benadryl 50mg around 9 am and took hydroxyzine 25mg at 8:20. She took Oxycodone 10mg at 8:20 also. She denies difficulty breathing now, or shortness of breath, but did have some yesterday after surgery.   She is able to drink liquids in small amounts but not able to eat solids. She states she thinks she received Versed yesterday and is not supposed to have that due to a previous reaction to it. Chart review states she reaction was at age 5 with waking up after surgery screaming and kicking.   She is also concerned about difficulty urinating. She is having urgency, frequency,  Pressure, and pain with urination. Has difficulty starting a stream and unable to fully empty her bladder. Is urinating every 30 minutes or so.     Recommended if breathing worsened to call 911. Boyfriend is with her now. Will discuss with Dr. Johnson and get back with her.     She can be reached at: [phone]  Ok to leave message :Yes    Please advise if you would like patient to go to the ER.    BRIDGER Juares RN

## 2019-09-26 NOTE — TELEPHONE ENCOUNTER
Per Dr. Johnson, patient is to go to the ED to be evaluated.     Phone call to patient and informed of Dr. Johnson's recommendations.. She is hesitant to go. She states she usually just goes to the closest Urgency room to her house in [place]. Informed she needs to go to a hospital ER not an Urgency room as they are not able to treat her appropriately. Strongly recommended the ED and any ED. Also suggested [facility] or [facility].Patient voiced more concern for the hives and difficulty urinating.  Discussed that concerns for her breathing are the number one priority,as her throat swelling can increase with an allergic reaction even though she has taken Benadryl and Hydroxyzine. Informed they can also address her bladder issues. She asked that writer call ahead to [facility] ED and inform she is coming so she doesn't have to wait so long. Informed writer will do so. Did clarify for [facility] not to [facility] and to go to [facility] building next door instead. She verbalized understanding.    Phone call to [facility] ED and spoke with charge nurse.     BRIDGER Juares RN

## 2019-09-26 NOTE — ED TRIAGE NOTES
Patient comes in for evaluation of post-operative complications. Patient states that she had a ligament repair done yesterday. Before discharge had issues with throat swelling and difficulty swallowing. Patient reports that continues today, is having trouble swallowing water. Patient also notes rash to upper body and legs and face. Patient also complaining of difficulty urinating. Would also like her left arm looked at. Patient states that her IV infiltrated yesterday, was told it was fluid and fentanyl, states arm puffed up and today it has gone down but is still very swollen and painful. ABCs intact.

## 2019-09-30 ENCOUNTER — TELEPHONE (OUTPATIENT)
Dept: GASTROENTEROLOGY | Facility: CLINIC | Age: 24
End: 2019-09-30

## 2019-09-30 NOTE — TELEPHONE ENCOUNTER
[facility] Call Center    Phone Message    May a detailed message be left on voicemail: yes    Reason for Call: Other: Pt requesting visit with nutrition - has seen Cara in the past. Please contact patient      Action Taken: Message routed to:  [facility] ([facility]): AMAYA

## 2019-10-01 ENCOUNTER — OFFICE VISIT (OUTPATIENT)
Dept: PODIATRY | Facility: CLINIC | Age: 24
End: 2019-10-01
Payer: COMMERCIAL

## 2019-10-01 VITALS
SYSTOLIC BLOOD PRESSURE: 106 MMHG | BODY MASS INDEX: 27.82 KG/M2 | DIASTOLIC BLOOD PRESSURE: 80 MMHG | HEIGHT: 63 IN | WEIGHT: 157 LBS

## 2019-10-01 DIAGNOSIS — Z98.890 S/P ANKLE LIGAMENT REPAIR: Primary | ICD-10-CM

## 2019-10-01 PROCEDURE — 99024 POSTOP FOLLOW-UP VISIT: CPT | Performed by: PODIATRIST

## 2019-10-01 RX ORDER — HYDROXYZINE HYDROCHLORIDE 25 MG/1
TABLET, FILM COATED ORAL
Qty: 15 TABLET | Refills: 0 | Status: SHIPPED | OUTPATIENT
Start: 2019-10-01 | End: 2019-12-31

## 2019-10-01 RX ORDER — OXYCODONE AND ACETAMINOPHEN 5; 325 MG/1; MG/1
TABLET ORAL
Qty: 15 TABLET | Refills: 0 | Status: SHIPPED | OUTPATIENT
Start: 2019-10-01 | End: 2019-10-30

## 2019-10-01 ASSESSMENT — MIFFLIN-ST. JEOR: SCORE: 1426.28

## 2019-10-01 NOTE — PROGRESS NOTES
"Foot & Ankle Surgery  October 1, 2019    S:  Patient in today approx 6 days sp ankle Gram stain, ankle scope, lateral ankle stabilization.  Pain levels improving but elevated.  Following post-op instructions    /80   Ht 1.6 m (5' 3\")   Wt 71.2 kg (157 lb)   LMP 09/25/2019 (Exact Date)   BMI 27.81 kg/m        ROS - positive for CC.  Patient denies current nausea, vomiting, chills, fevers, belly pain, calf pain, chest pain or SOB.  Complete remainder of ROS is otherwise neg.    PE - sutures intact, skin margins well coapted.  Moderate lateral ankle edema, but no drainage, soft tissue crepitus or erythema.  Skin shows no trophic, color or temperature changes otherwise.  No calf redness, swelling or pain noted otherwise.    Imaging - IMPRESSION: Bones are normally aligned. Rounded lucency overlying the  distal fibula thought to be related to surgery. Subcutaneous air over  the fibula as expected given recent surgery.    Pathology - FINAL DIAGNOSIS:   Soft tissue, left ankle: Excision:   - Fragments of abundant fibrin with scattered acute inflammatory cells   - No viable tissue identified     Cultures -   Gram Stain 09/25/2019  8:15    No organisms seen          A/P - 24 year old yo patient approx 6 days sp above procedure  -personally reviewed Path and Gram results  -personally reviewed surgery and operative findings  -redressed foot/ankle today  -continue all post-op instructions without change; reviewed  -refill of atarax and Percocet  -likely suture removal and started [facility] PT next visit    Follow up  -  1 week or sooner with acute issues      Body mass index is 27.81 kg/m .  Weight management plan: Patient was referred to their PCP to discuss a diet and exercise plan.      Andres Johnson DPM FACFAS FACFAOM  Podiatric Foot & Ankle Surgeon  [facility]  [phone]      "

## 2019-10-01 NOTE — PATIENT INSTRUCTIONS
Thank you for choosing [facility]!    DR. CRISOSTOMO'S CLINIC LOCATIONS:   MONDAY - [place] TUESDAY - [place]   [address] [facility]  [address]   [place] [address] [place] [address]   [phone]       THURSDAY AM - [place] THURSDAY PM - [place]   [address]   [place] [address] [place] [address]   [phone]       FRIDAY AM - [place] SET UP SURGERY: [phone] [address] APPOINTMENTS: [phone]   [place] [address] BILLING QUESTIONS: [phone] FAX NUMBER: [phone]     Follow Up: 1 week      FYI: The following information is included in the after visit summary for all patients:  Body weight can be a sensitive issue to discuss in clinic, but we think the following information is very important. Although we focus on the feet and ankles, we do support the overall health of our patients. Many things can cause foot and ankle problems. Foot structure, activity level, foot mechanics and injuries are common causes of pain. One very important issue that often goes unmentioned, is body weight. Extra weight can cause increased stress on muscles, ligaments, bones and tendons. Sometimes just a few extra pounds is all it takes to put one over her/his threshold. Without reducing that stress, it can be difficult to alleviate pain. As Foot & Ankle specialists, our job is addressing the lower extremity problem and possible causes. Regarding extra body weight, we encourage patients to discuss diet and weight management plans with their primary care doctors. It is this team approach that gives you the best opportunity for pain relief and getting you back on your feet. [facility] has a Comprehensive Weight Management Program. This program includes counseling, education, non-surgical and surgical approaches to weight loss. If you are interested in learning more either talk to you  primary care provider or call [phone].

## 2019-10-03 ENCOUNTER — OFFICE VISIT (OUTPATIENT)
Dept: PSYCHOLOGY | Facility: CLINIC | Age: 24
End: 2019-10-03
Payer: COMMERCIAL

## 2019-10-03 DIAGNOSIS — F41.1 GAD (GENERALIZED ANXIETY DISORDER): Primary | ICD-10-CM

## 2019-10-03 DIAGNOSIS — F32.A DEPRESSION: ICD-10-CM

## 2019-10-03 PROCEDURE — 90834 PSYTX W PT 45 MINUTES: CPT | Performed by: COUNSELOR

## 2019-10-03 ASSESSMENT — COLUMBIA-SUICIDE SEVERITY RATING SCALE - C-SSRS
5. HAVE YOU STARTED TO WORK OUT OR WORKED OUT THE DETAILS OF HOW TO KILL YOURSELF? DO YOU INTEND TO CARRY OUT THIS PLAN?: YES
6. HAVE YOU EVER DONE ANYTHING, STARTED TO DO ANYTHING, OR PREPARED TO DO ANYTHING TO END YOUR LIFE?: NO
TOTAL  NUMBER OF ABORTED OR SELF INTERRUPTED ATTEMPTS PAST 3 MONTHS: NO
ATTEMPT LIFETIME: YES
1. IN THE PAST MONTH, HAVE YOU WISHED YOU WERE DEAD OR WISHED YOU COULD GO TO SLEEP AND NOT WAKE UP?: YES
4. HAVE YOU HAD THESE THOUGHTS AND HAD SOME INTENTION OF ACTING ON THEM?: YES
4. HAVE YOU HAD THESE THOUGHTS AND HAD SOME INTENTION OF ACTING ON THEM?: NO
5. HAVE YOU STARTED TO WORK OUT OR WORKED OUT THE DETAILS OF HOW TO KILL YOURSELF? DO YOU INTEND TO CARRY OUT THIS PLAN?: NO
2. HAVE YOU ACTUALLY HAD ANY THOUGHTS OF KILLING YOURSELF?: YES
TOTAL  NUMBER OF ACTUAL ATTEMPTS LIFETIME: 1
TOTAL  NUMBER OF ABORTED OR SELF INTERRUPTED ATTEMPTS PAST LIFETIME: YES
2. HAVE YOU ACTUALLY HAD ANY THOUGHTS OF KILLING YOURSELF LIFETIME?: YES
ATTEMPT PAST THREE MONTHS: NO
3. HAVE YOU BEEN THINKING ABOUT HOW YOU MIGHT KILL YOURSELF?: YES
REASONS FOR IDEATION PAST MONTH: EQUALLY TO GET ATTENTION, REVENGE OR A REACTION FROM OTHERS AND TO END/STOP THE PAIN
1. IN THE PAST MONTH, HAVE YOU WISHED YOU WERE DEAD OR WISHED YOU COULD GO TO SLEEP AND NOT WAKE UP?: YES
6. HAVE YOU EVER DONE ANYTHING, STARTED TO DO ANYTHING, OR PREPARED TO DO ANYTHING TO END YOUR LIFE?: YES

## 2019-10-03 ASSESSMENT — ANXIETY QUESTIONNAIRES
GAD7 TOTAL SCORE: 11
7. FEELING AFRAID AS IF SOMETHING AWFUL MIGHT HAPPEN: SEVERAL DAYS
5. BEING SO RESTLESS THAT IT IS HARD TO SIT STILL: MORE THAN HALF THE DAYS
6. BECOMING EASILY ANNOYED OR IRRITABLE: SEVERAL DAYS
3. WORRYING TOO MUCH ABOUT DIFFERENT THINGS: MORE THAN HALF THE DAYS
1. FEELING NERVOUS, ANXIOUS, OR ON EDGE: MORE THAN HALF THE DAYS
2. NOT BEING ABLE TO STOP OR CONTROL WORRYING: SEVERAL DAYS
IF YOU CHECKED OFF ANY PROBLEMS ON THIS QUESTIONNAIRE, HOW DIFFICULT HAVE THESE PROBLEMS MADE IT FOR YOU TO DO YOUR WORK, TAKE CARE OF THINGS AT HOME, OR GET ALONG WITH OTHER PEOPLE: SOMEWHAT DIFFICULT

## 2019-10-03 ASSESSMENT — PATIENT HEALTH QUESTIONNAIRE - PHQ9
5. POOR APPETITE OR OVEREATING: MORE THAN HALF THE DAYS
SUM OF ALL RESPONSES TO PHQ QUESTIONS 1-9: 10
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
## 2019-10-04 ASSESSMENT — ANXIETY QUESTIONNAIRES: GAD7 TOTAL SCORE: 11

## 2019-10-08 ENCOUNTER — OFFICE VISIT (OUTPATIENT)
Dept: PODIATRY | Facility: CLINIC | Age: 24
End: 2019-10-08
Payer: COMMERCIAL

## 2019-10-08 VITALS
DIASTOLIC BLOOD PRESSURE: 86 MMHG | WEIGHT: 157 LBS | HEIGHT: 63 IN | SYSTOLIC BLOOD PRESSURE: 110 MMHG | BODY MASS INDEX: 27.82 KG/M2

## 2019-10-08 DIAGNOSIS — Z98.890 S/P ANKLE LIGAMENT REPAIR: Primary | ICD-10-CM

## 2019-10-08 PROCEDURE — 99024 POSTOP FOLLOW-UP VISIT: CPT | Performed by: PODIATRIST

## 2019-10-08 ASSESSMENT — MIFFLIN-ST. JEOR: SCORE: 1426.28

## 2019-10-08 NOTE — PROGRESS NOTES
"Foot & Ankle Surgery  October 8, 2019    S:  Patient in today approx 2 weeks sp left ankle scope and InternalBrace ATFL repair.  Pain levels elevated as she recently fell on the foot.  She was in [employer], the floor was wet and she slipped.      /86   Ht 1.6 m (5' 3\")   Wt 71.2 kg (157 lb)   LMP 09/25/2019 (Exact Date)   BMI 27.81 kg/m        ROS - positive for CC.  Patient denies current nausea, vomiting, chills, fevers, belly pain, calf pain, chest pain or SOB.  Complete remainder of ROS is otherwise neg.    PE - sutures intact, skin margins well coapted.  Moderate edema lateral ankle.  While there is slight anterior migration with anterior drawer, no gross instability noted.  No drainage/SOI, but will leave sutures intact.  Skin shows no trophic, color or temperature changes otherwise.  No calf redness, swelling or pain noted otherwise.    A/P - 24 year old yo patient approx 2 weeks sp above procedure  -medial scope portal suture removed, but will leave remaining sutures intact  -no twisting mechanism to her fall per patient report, and she was wearing the PFS splint.  While there is some increase in anterior drawer movement, I don't sense any gross instability.  Will get patient in to PT for functional rehab, I don't think exploratory surgery to assess the repair is warranted at this point.    -[employer] DME order given    Follow up  -  1 week or sooner with acute issues    Body mass index is 27.81 kg/m .  Weight management plan: Patient was referred to their PCP to discuss a diet and exercise plan.      Andres Johnson DPM FACFAS FACFAOM  Podiatric Foot & Ankle Surgeon  [facility]  [phone]      "

## 2019-10-08 NOTE — PATIENT INSTRUCTIONS
Thank you for choosing [facility] / [facility]!    DR. CRISOSTOMO'S CLINIC LOCATIONS:   MONDAY - [place] TUESDAY - [place]   [address] [facility]  [address] [place] [address]   [place] [address] [place] [address]   [phone]       THURSDAY AM - [place] THURSDAY PM - [place]   [address]   [place] [address] [place], [place] [address] [phone]       FRIDAY AM - [place] SET UP SURGERY: [phone] [address] APPOINTMENTS: [phone]   [place] [address] BILLING QUESTIONS: [phone] FAX NUMBER: [phone]     Follow Up: 1 week    Home Medical Equipment:  1. [facility]    [facility] -[address]  [address]   [place] [phone]     2. [place] [facility] - [address], [place], [phone]          FYI: The following information is included in the after visit summary for all patients:  Body weight can be a sensitive issue to discuss in clinic, but we think the following information is very important. Although we focus on the feet and ankles, we do support the overall health of our patients. Many things can cause foot and ankle problems. Foot structure, activity level, foot mechanics and injuries are common causes of pain. One very important issue that often goes unmentioned, is body weight. Extra weight can cause increased stress on muscles, ligaments, bones and tendons. Sometimes just a few extra pounds is all it takes to put one over her/his threshold. Without reducing that stress, it can be difficult to alleviate pain. As Foot & Ankle specialists, our job is addressing the lower extremity problem and possible causes. Regarding extra body weight, we encourage patients to discuss diet and weight management plans with their primary care doctors. It is this team approach that  gives you the best opportunity for pain relief and getting you back on your feet. [facility] has a Comprehensive Weight Management Program. This program includes counseling, education, non-surgical and surgical approaches to weight loss. If you are interested in learning more either talk to you primary care provider or call [phone].

## 2019-10-15 ENCOUNTER — OFFICE VISIT (OUTPATIENT)
Dept: PODIATRY | Facility: CLINIC | Age: 24
End: 2019-10-15
Payer: COMMERCIAL

## 2019-10-15 ENCOUNTER — OFFICE VISIT (OUTPATIENT)
Dept: PSYCHOLOGY | Facility: CLINIC | Age: 24
End: 2019-10-15
Payer: COMMERCIAL

## 2019-10-15 VITALS
HEIGHT: 63 IN | WEIGHT: 157 LBS | DIASTOLIC BLOOD PRESSURE: 82 MMHG | BODY MASS INDEX: 27.82 KG/M2 | SYSTOLIC BLOOD PRESSURE: 114 MMHG

## 2019-10-15 DIAGNOSIS — Z98.890 S/P ANKLE LIGAMENT REPAIR: Primary | ICD-10-CM

## 2019-10-15 DIAGNOSIS — F41.1 GAD (GENERALIZED ANXIETY DISORDER): Primary | ICD-10-CM

## 2019-10-15 DIAGNOSIS — F32.A DEPRESSION: ICD-10-CM

## 2019-10-15 PROCEDURE — 90834 PSYTX W PT 45 MINUTES: CPT | Performed by: COUNSELOR

## 2019-10-15 PROCEDURE — 99024 POSTOP FOLLOW-UP VISIT: CPT | Performed by: PODIATRIST

## 2019-10-15 ASSESSMENT — ANXIETY QUESTIONNAIRES
IF YOU CHECKED OFF ANY PROBLEMS ON THIS QUESTIONNAIRE, HOW DIFFICULT HAVE THESE PROBLEMS MADE IT FOR YOU TO DO YOUR WORK, TAKE CARE OF THINGS AT HOME, OR GET ALONG WITH OTHER PEOPLE: SOMEWHAT DIFFICULT
GAD7 TOTAL SCORE: 9
6. BECOMING EASILY ANNOYED OR IRRITABLE: SEVERAL DAYS
3. WORRYING TOO MUCH ABOUT DIFFERENT THINGS: MORE THAN HALF THE DAYS
2. NOT BEING ABLE TO STOP OR CONTROL WORRYING: SEVERAL DAYS
5. BEING SO RESTLESS THAT IT IS HARD TO SIT STILL: SEVERAL DAYS
1. FEELING NERVOUS, ANXIOUS, OR ON EDGE: SEVERAL DAYS
7. FEELING AFRAID AS IF SOMETHING AWFUL MIGHT HAPPEN: SEVERAL DAYS

## 2019-10-15 ASSESSMENT — PATIENT HEALTH QUESTIONNAIRE - PHQ9
5. POOR APPETITE OR OVEREATING: MORE THAN HALF THE DAYS
SUM OF ALL RESPONSES TO PHQ QUESTIONS 1-9: 7

## 2019-10-15 ASSESSMENT — MIFFLIN-ST. JEOR: SCORE: 1426.28
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
## 2019-10-15 NOTE — PATIENT INSTRUCTIONS
Thank you for choosing [facility]!    DR. CRISOSTOMO'S CLINIC LOCATIONS:   MONDAY - [place] TUESDAY - [place]   [address] [facility]  [address]   [place] [address] [place] [address]   [phone]       THURSDAY AM - [place] THURSDAY PM - [place]   [address]   [place] [address] [place], [place] [id number] [phone]       FRIDAY AM - [place] SET UP SURGERY: [phone] [address] APPOINTMENTS: [phone]   [place] [address] BILLING QUESTIONS: [phone] FAX NUMBER: [phone]     [employer] / CAM WALKING BOOT INSTRUCTIONS  - Do NOT drive with CAM walker on. This is due to safety and legal issues.   - Do NOT wear the CAM walker on long car/train rides or on an airplane.  - Remove the CAM walker several times a day and do ankle range of motion (ROM) exercises/wiggle toes.  - It is recommended that a thick-soled shoe be worn on the other foot to offset any created leg length issue.   - The boot does not have to be worn at night.   - There is an increased risk of developing a blood clot with lower extremity immobilization. ROM exercises and knee-high compression (tenso /ACE wrap) is recommended to lower that risk.   - You should seek medical attention if you experience calf swelling and/or pain, chest pain, or shortness of breath.           FYI: The following information is included in the after visit summary for all patients:  Body weight can be a sensitive issue to discuss in clinic, but we think the following information is very important. Although we focus on the feet and ankles, we do support the overall health of our patients. Many things can cause foot and ankle problems. Foot structure, activity level, foot mechanics and injuries are common causes of pain. One very important issue that often goes unmentioned, is body weight. Extra weight can cause increased stress  on muscles, ligaments, bones and tendons. Sometimes just a few extra pounds is all it takes to put one over her/his threshold. Without reducing that stress, it can be difficult to alleviate pain. As Foot & Ankle specialists, our job is addressing the lower extremity problem and possible causes. Regarding extra body weight, we encourage patients to discuss diet and weight management plans with their primary care doctors. It is this team approach that gives you the best opportunity for pain relief and getting you back on your feet. [facility] has a Comprehensive Weight Management Program. This program includes counseling, education, non-surgical and surgical approaches to weight loss. If you are interested in learning more either talk to you primary care provider or call [phone].

## 2019-10-15 NOTE — PROGRESS NOTES
"Foot & Ankle Surgery  October 15, 2019    S:  Patient in today approx 3 weeks sp ankle scope and revision lateral ankle stabilization.  Pain levels improved but can be sore.  Following post-op instructions    Ht 1.6 m (5' 3\")   Wt 71.2 kg (157 lb)   LMP 09/25/2019 (Exact Date)   BMI 27.81 kg/m        ROS - positive for CC.  Patient denies current nausea, vomiting, chills, fevers, belly pain, calf pain, chest pain or SOB.  Complete remainder of ROS is otherwise neg.    PE - lateral incision fully healed, all sutures removed.  Lateral ankle swelling noted.  Anterior drawer today presents with no anterior migration that was noted on last exam.  Skin shows no trophic, color or temperature changes otherwise.  No calf redness, swelling or pain noted otherwise.    A/P - 24 year old yo patient approx 3 weeks sp above procedure  -sutures removed, no s-s needed  -tensogrip for edema control; RICE/tylenol prn based on pain  -start protected WB in boot(dispensed) with crutches(has); avoid exceeding pain threshold, but increase weight to tolerance  -referral for PT at [facility] for functional rehab    Follow up  -  3 weeks or sooner with acute issues      Body mass index is 27.81 kg/m .  Weight management plan: Patient was referred to their PCP to discuss a diet and exercise plan.      Andres Johnson DPM FACFAS FACFAOM  Podiatric Foot & Ankle Surgeon  [facility]  [phone]      "

## 2019-10-16 ENCOUNTER — TELEPHONE (OUTPATIENT)
Dept: RHEUMATOLOGY | Facility: CLINIC | Age: 24
End: 2019-10-16

## 2019-10-16 ASSESSMENT — ANXIETY QUESTIONNAIRES: GAD7 TOTAL SCORE: 9

## 2019-10-16 NOTE — TELEPHONE ENCOUNTER
Dr. Queen agreed to add the patient to her schedule, due to a scheduling mishap. I believe the patient felt like they were waiting for a long period of time so they had left. I did reach out to patient via cell phone and left her a message explaining that we had gotten her added to schedule and would love to see her.

## 2019-10-17 ENCOUNTER — OFFICE VISIT (OUTPATIENT)
Dept: PHYSICAL MEDICINE AND REHAB | Facility: CLINIC | Age: 24
End: 2019-10-17
Payer: COMMERCIAL

## 2019-10-17 VITALS
DIASTOLIC BLOOD PRESSURE: 81 MMHG | OXYGEN SATURATION: 97 % | HEART RATE: 108 BPM | TEMPERATURE: 98.2 F | SYSTOLIC BLOOD PRESSURE: 121 MMHG

## 2019-10-17 DIAGNOSIS — G43.719 CHRONIC MIGRAINE WITHOUT AURA, INTRACTABLE, WITHOUT STATUS MIGRAINOSUS: Primary | ICD-10-CM

## 2019-10-17 ASSESSMENT — PAIN SCALES - GENERAL: PAINLEVEL: SEVERE PAIN (6)

## 2019-10-17 NOTE — LETTER
10/17/2019       RE: Samara Oropeza  [address]  [address]  [place] [address]     Dear Colleague,    Thank you for referring your patient, Samara Oropeza, to the [facility]. Please see a copy of my visit note below.    BOTULINUM TOXIN PROCEDURE - HEADACHE - NOTE    Chief Complaint   Patient presents with     Headache     UMP RETURN BOTOX FOR MIGRAINES     /81 (Patient Position: Sitting)   Pulse 108   Temp 98.2  F (36.8  C) (Oral)   LMP 09/25/2019 (Exact Date)   SpO2 97%      Current Outpatient Medications:      albuterol (PROAIR HFA/PROVENTIL HFA/VENTOLIN HFA) 108 (90 BASE) MCG/ACT Inhaler, Inhale 2 puffs into the lungs every 6 hours as needed for shortness of breath / dyspnea or wheezing, Disp: 1 Inhaler, Rfl: 1     amitriptyline (ELAVIL) 25 MG tablet, Take 1 tablet (25 mg) by mouth At Bedtime, Disp: 90 tablet, Rfl: 1     artificial tears OINT ophthalmic ointment, 0.5 inch strip each eye at night, Disp: 1 Tube, Rfl: 11     aspirin (ASPIRIN CHILDRENS) 81 MG chewable tablet, Take 81 mg by mouth as needed , Disp: , Rfl:      benzocaine (TOPICALE XTRA) 20 % GEL, Apply as needed locally to mouth or nasal ulcers for pain; 4 times daily as needed, Disp: 30 g, Rfl: 1     betamethasone valerate (VALISONE) 0.1 % cream, Apply topically 2 times daily as needed , Disp: , Rfl:      bisacodyl (DULCOLAX) 5 MG EC tablet, Take 2 tablets (10 mg) by mouth daily as needed for constipation, Disp: 30 tablet, Rfl: 0     citalopram (CELEXA) 20 MG tablet, Take 1 tablet (20 mg) by mouth daily, Disp: 90 tablet, Rfl: 1     clobetasol (TEMOVATE) 0.05 % cream, Apply topically 2 times daily, Disp: 60 g, Rfl: 0     colchicine (COLCYRS) 0.6 MG tablet, Take 1 tablet (0.6 mg) by mouth 2 times daily, Disp: 180 tablet, Rfl: 1     cyproheptadine (PERIACTIN) 4 MG tablet, Take 2 tablets (8 mg) by mouth At Bedtime For nightmares, Disp: 180 tablet,  Rfl: 2     dicyclomine (BENTYL) 20 MG tablet, Take 1 tablet (20 mg) by mouth 4 times daily as needed (Patient not taking: Reported on 7/24/2019), Disp: 120 tablet, Rfl: 3     diphenhydrAMINE (BENADRYL) 25 MG tablet, Take 1 tablet (25 mg) by mouth 3 times daily as needed (itching), Disp: 40 tablet, Rfl: 1     EPINEPHrine (EPIPEN 2-EAMON) 0.3 MG/0.3ML injection, Inject 0.3 mLs (0.3 mg) into the muscle as needed for anaphylaxis (Patient not taking: Reported on 7/24/2019), Disp: 0.6 mL, Rfl: 3     gabapentin (NEURONTIN) 100 MG capsule, TAKE 1 CAPSULE (100 MG) BY MOUTH 3 TIMES DAILY AS NEEDED (ANXIETY), Disp: 90 capsule, Rfl: 1     guanFACINE (TENEX) 1 MG tablet, Take 3 tablets (3 mg) by mouth twice daily in the morning and evening., Disp: 540 tablet, Rfl: 1     hydrocortisone 1 % CREA cream, Place rectally 2 times daily as needed for itching, Disp: 28.35 g, Rfl: 1     hydrOXYzine (ATARAX) 25 MG tablet, Take 1-2 tablets every 4-6 hours as needed for pain control. (Patient not taking: Reported on 10/15/2019), Disp: 15 tablet, Rfl: 0     hydrOXYzine (ATARAX) 25 MG tablet, Take 1-2 tablets every 4-6 hours as needed for pain control. (Patient not taking: Reported on 10/15/2019), Disp: 30 tablet, Rfl: 0     hyoscyamine (ANASPAZ/LEVSIN) 0.125 MG tablet, Take 1-2 tablets (125-250 mcg) by mouth every 4 hours as needed for cramping, Disp: 40 tablet, Rfl: 1     hypromellose (GENTEAL) 0.3 % SOLN, 1 drop every hour as needed for dry eyes , Disp: , Rfl:      ibuprofen (ADVIL/MOTRIN) 600 MG tablet, Take 600mg of ibuprofen every 6 hours x 7 days to assist in pain control.  If you are not tolerating the medication, you are ok to stop. (Patient not taking: Reported on 10/15/2019), Disp: 28 tablet, Rfl: 0     lactulose 20 GM/30ML SOLN, Take 30 mLs by mouth 3 times daily as needed (for constipation) (Patient not taking: Reported on 7/24/2019), Disp: 300 mL, Rfl: 3     lidocaine (LMX4) 4 % external cream, Apply topically once as needed for  mild pain, Disp: 120 g, Rfl: 1     lidocaine (LMX4) 4 % external cream, Apply 1 Application topically once as needed for mild pain, Disp: , Rfl:      linaclotide (LINZESS) 290 MCG capsule, Take 1 capsule (290 mcg) by mouth every morning (before breakfast), Disp: 90 capsule, Rfl: 0     LORazepam (ATIVAN) 0.5 MG tablet, Take 1 tablet (0.5 mg) by mouth every 6 hours as needed for anxiety, Disp: 10 tablet, Rfl: 0     omeprazole (PRILOSEC) 40 MG capsule, Take 1 capsule (40 mg) by mouth daily (Patient not taking: Reported on 9/17/2019), Disp: 90 capsule, Rfl: 3     ondansetron (ZOFRAN-ODT) 8 MG ODT tab, Take 1 tablet (8 mg) by mouth every 8 hours as needed for nausea, Disp: 180 tablet, Rfl: 1     order for DME, Equipment being ordered: 8 1/2 tall boot, Disp: 1 Device, Rfl: 0     order for DME, Equipment being ordered: RollAbout knee scooter x 3 months (Patient not taking: Reported on 10/15/2019), Disp: 1 Device, Rfl: 0     order for DME, Equipment being ordered: Trilok brace 8 1/2 left lower extremity (Patient not taking: Reported on 9/17/2019), Disp: 1 Device, Rfl: 0     order for DME, Roll-A-Bout Walker. Patient can use for 3 months (Patient not taking: Reported on 9/17/2019), Disp: 1 Units, Rfl: 0     order for DME, Equipment being ordered: size 8 1/2 walking boot tall, Disp: 1 Device, Rfl: 0     order for DME, Equipment being ordered: RollAbout knee scooter, Disp: 1 Device, Rfl: 0     oxyCODONE IR (ROXICODONE) 10 MG tablet, Take 1 tablets every 4-6 hours as needed for pain. (Patient not taking: Reported on 10/15/2019), Disp: 30 tablet, Rfl: 0     oxyCODONE-acetaminophen (PERCOCET) 5-325 MG tablet, Take 1-2 tablets every 4-6 hours as needed for pain.  Do not take other tylenol products with this medication, as too much tylenol can be damaging to the liver. (Patient not taking: Reported on 10/15/2019), Disp: 15 tablet, Rfl: 0     polyethylene glycol (MIRALAX/GLYCOLAX) powder, Take 1 capful by mouth 3 times daily, Disp:  , Rfl:      promethazine (PHENERGAN) 12.5 MG tablet, Take 1-2 tablets (12.5-25 mg) by mouth every 6 hours as needed for nausea (Patient not taking: Reported on 7/24/2019), Disp: 30 tablet, Rfl: 3     ranitidine (ZANTAC) 150 MG tablet, Take 150 mg by mouth 2 times daily, Disp: , Rfl:      sennosides (SENOKOT) 8.6 MG tablet, Take 3 tablets by mouth 2 times daily (Patient not taking: Reported on 9/17/2019), Disp: 240 tablet, Rfl: 3     sodium chloride 0.9% SOLN BOLUS, Inject 1,000 mLs into the vein daily as needed (IV abx and flushes), Disp: 1000 mL, Rfl: 11     SPRINTEC 28 0.25-35 MG-MCG tablet, Take one tablet by mouth daily. Take continuously., Disp: 84 tablet, Rfl: 3     sucralfate (CARAFATE) 1 GM/10ML suspension, Take 10 mLs (1 g) by mouth 4 times daily, Disp: 1200 mL, Rfl: 2     triamcinolone (KENALOG) 0.5 % external ointment, Apply 1 g topically 3 times daily as needed for irritation, Disp: 15 g, Rfl: 3     Allergies   Allergen Reactions     Amoxil [Penicillins] Rash     Dad unsure of reaction.     Bee Venom Anaphylaxis     Contrast Dye Rash     [place] [facility] [place] Media [place] [facility] [place]  NOTE: this is a contrast media oral with iodine. Premedicate with methylpred standard for IV contrast, request barium contrast for oral contrast.     Orange Fruit [Citrus] Anaphylaxis     Pineapple Anaphylaxis, Difficulty breathing and Rash     Reglan [Metoclopramide] Other (See Comments)     IV dose only, in ER, rapid heart rate.     Ace Inhibitors      Difficulty in breathing and GI upset     Amitiza [Lubiprostone] Nausea and Vomiting     Amoxicillin-Pot Clavulanate      Midazolam Unknown     parent states that when pt takes this medication, she wakes up being very violent .     No Clinical Screening - See Comments      Bleech/ chest tightness, itchy throat, swollen tongue, hives     Tizanidine Other (See Comments)     Confusion, back pain, photophobia, abdominal pain, shaking,  anxious       Versed      Coming out of pelvic exam at age of 5, was kicking and screaming when coming out of the versed.     Adhesive Tape Rash     Azithromycin Hives and Rash     Cephalexin Itching and Rash        PHYSICAL EXAM:    Pt reports migraine headache today, rates 6/10 started 3 days ago.  Took Imitrex yesterday without relief.    HPI:    Patient reports the following new medical problems since last visit: fever, nausea and vomiting after last injections.    We reviewed the recommended safety guidelines for  Botox from any vaccine injection, such as the seasonal flu vaccine, by a minimum of 10-14 days with Samara Oropeza. She acknowledged understanding.    RESPONSE TO PREVIOUS TREATMENT:  Change in headache pattern following last series of injections with 185 units of  Botox on 2019.    Malaise:  Rated as 'Moderate' severity.  Duration: 2 weeks resolved.    1.  Headache frequency during this injection cycle:  1 headache days per month.  This is compared to her baseline headache frequency of 39 headache days per month.     2.  Headache duration during this injection cycle:  Headache duration 3 days.     3.  Headache intensity during this injection cycle:    A.  6/10  =  Typical pain level.  B.  9/10  =  Worst pain level.  C.  0/10  =  Lowest pain level.    4.  Change in headache medication usage during this injection cycle:  (For Example:  Able to decrease use of oral pain medications.) No change in use of pain medication    5.  ER Visits During This Injection Cycle:  0    6.  Functional Performance:  Change in ADL's, social interaction, days lost from work, etc. Very difficult dental visit yesterday due to migraine headache but did not cancel any activities.      BOTULINUM NEUROTOXIN INJECTION PROCEDURES:    VERIFICATION OF PATIENT IDENTIFICATION AND PROCEDURE     Initials   Patient Name lmd   Patient  lmd   Procedure Verified by: emmy     Prior to the start of the procedure and with  procedural staff participation, I verbally confirmed the patient s identity using two indicators, relevant allergies, that the procedure was appropriate and matched the consent or emergent situation, and that the correct equipment/implants were available. Immediately prior to starting the procedure I conducted the Time Out with the procedural staff and re-confirmed the patient s name, procedure, and site/side. (The Joint Commission universal protocol was followed.)  Yes    Sedation (Moderate or Deep): None    Above assessments performed by:  Vidya Bryson RN Care Coordinator    Alejandrina Hernandez MD      INDICATIONS FOR PROCEDURES:  Samara Oropeza is a 24 year old patient with chronic migraine headaches associated with cervicogenic components.      Her baseline symptoms have been recalcitrant to oral medications and conservative therapy.  She is here today for reinjection with Botox.     GOAL OF PROCEDURE:  The goal of this procedure is to decrease pain .    TOTAL DOSE ADMINISTERED:  Dose Administered:  185 units  Botox (Botulinum Toxin Type A)       2:1 Dilution   Diluent Used:  0.25% Sensorcaine  (NDC: 55150-167-10  Lot: [id number]  Exp: 11/2021  Total Volume of Diluent Used:  4.0 ml  Lot # /[id number] with Expiration Date:  3/2022  NDC #: Botox 100u ([address]-53)    Was there drug waste? Yes  Amount of drug waste (mL): 15 units Botox.  Reason for waste:  Single use vial  Multi-dose vial: No    Alejandrina Hernandez MD  October 18, 2019     Medication guide was offered to patient and was declined.    CONSENT:  The risks, benefits, and treatment options were discussed with Samara Oropeza and she agreed to proceed.    Written consent was obtained by lmd.     EQUIPMENT USED:  Needle-30 gauge    SKIN PREPARATION:  Skin preparation was performed using an alcohol wipe.     GUIDANCE DESCRIPTION:  No guidance was utilized.     AREA/MUSCLE INJECTED:  185 UNITS BOTOX = TOTAL DOSE     1 & 2. SHOULDER GIRDLE &  NECK MUSCLES: 30 units Botox = Total Dose, 2:1 Dilution      Right Splenius - 10 units of Botox at 1 site/s.   Left Splenius - 10 units of Botox at 1 site/s.      Right Levator Scapulae - 5 units of Botox at 1 site/s (shoulder muscles).   Left Levator Scapulae - 5 units of Botox at 1 site/s (shoulder muscles).     3. HEAD & SCALP MUSCLES: 155 units Botox = Total Dose, 2:1 Dilution     Right Occipitalis - 10 units of Botox at 3 site/s.   Left Occipitalis - 10 units of Botox at 3 site/s.     Right Frontalis - 15 units of Botox at 4 site/s.  Left Frontalis - 15 units of Botox at 4 site/s.     Right Temporalis - 45 units of Botox at 8 site/s.  Left Temporalis - 45 units of Botox at 8 site/s.     Right  - 5 units of Botox at 1 site/s.              Left  - 5 units of Botox at 1 site/s.                 Procerus - 5 units of Botox at 1 site/s.    RESPONSE TO PROCEDURE:  Samara Oropeza tolerated the procedure well and there were no immediate complications.   She was allowed to recover for an appropriate period of time and was discharged home in stable condition.    FOLLOW UP:  Samara Oropeza was asked to follow up by phone in 7-14 days with Marcelle Richardson PT, Care Coordinator or Vidya Dickinson RN, Care Coordinator, to report her response to this series of injections.  Based on the patient's previous response to this therapy, Samara Oropeza was rescheduled for the next series of injections in 12 weeks.    PLAN (Medication Changes, Therapy Orders, Work or Disability Issues, etc.): Given that the patient does not follow regularly with a Neurologist for her headaches, referral was placed today.  Patient will continue to monitor response to today's injections.    Again, thank you for allowing me to participate in the care of your patient.      Sincerely,    Alejandrina Hernandez MD

## 2019-10-17 NOTE — PROGRESS NOTES
BOTULINUM TOXIN PROCEDURE - HEADACHE - NOTE    Chief Complaint   Patient presents with     Headache     [facility] RETURN BOTOX FOR MIGRAINES     /81 (Patient Position: Sitting)   Pulse 108   Temp 98.2  F (36.8  C) (Oral)   LMP 09/25/2019 (Exact Date)   SpO2 97%      Current Outpatient Medications:      albuterol (PROAIR HFA/PROVENTIL HFA/VENTOLIN HFA) 108 (90 BASE) MCG/ACT Inhaler, Inhale 2 puffs into the lungs every 6 hours as needed for shortness of breath / dyspnea or wheezing, Disp: 1 Inhaler, Rfl: 1     amitriptyline (ELAVIL) 25 MG tablet, Take 1 tablet (25 mg) by mouth At Bedtime, Disp: 90 tablet, Rfl: 1     artificial tears OINT ophthalmic ointment, 0.5 inch strip each eye at night, Disp: 1 Tube, Rfl: 11     aspirin (ASPIRIN CHILDRENS) 81 MG chewable tablet, Take 81 mg by mouth as needed , Disp: , Rfl:      benzocaine (TOPICALE XTRA) 20 % GEL, Apply as needed locally to mouth or nasal ulcers for pain; 4 times daily as needed, Disp: 30 g, Rfl: 1     betamethasone valerate (VALISONE) 0.1 % cream, Apply topically 2 times daily as needed , Disp: , Rfl:      bisacodyl (DULCOLAX) 5 MG EC tablet, Take 2 tablets (10 mg) by mouth daily as needed for constipation, Disp: 30 tablet, Rfl: 0     citalopram (CELEXA) 20 MG tablet, Take 1 tablet (20 mg) by mouth daily, Disp: 90 tablet, Rfl: 1     clobetasol (TEMOVATE) 0.05 % cream, Apply topically 2 times daily, Disp: 60 g, Rfl: 0     colchicine (COLCYRS) 0.6 MG tablet, Take 1 tablet (0.6 mg) by mouth 2 times daily, Disp: 180 tablet, Rfl: 1     cyproheptadine (PERIACTIN) 4 MG tablet, Take 2 tablets (8 mg) by mouth At Bedtime For nightmares, Disp: 180 tablet, Rfl: 2     dicyclomine (BENTYL) 20 MG tablet, Take 1 tablet (20 mg) by mouth 4 times daily as needed (Patient not taking: Reported on 7/24/2019), Disp: 120 tablet, Rfl: 3     diphenhydrAMINE (BENADRYL) 25 MG tablet, Take 1 tablet (25 mg) by mouth 3 times daily as needed (itching), Disp: 40 tablet, Rfl: 1      EPINEPHrine (EPIPEN 2-EAMON) 0.3 MG/0.3ML injection, Inject 0.3 mLs (0.3 mg) into the muscle as needed for anaphylaxis (Patient not taking: Reported on 7/24/2019), Disp: 0.6 mL, Rfl: 3     gabapentin (NEURONTIN) 100 MG capsule, TAKE 1 CAPSULE (100 MG) BY MOUTH 3 TIMES DAILY AS NEEDED (ANXIETY), Disp: 90 capsule, Rfl: 1     guanFACINE (TENEX) 1 MG tablet, Take 3 tablets (3 mg) by mouth twice daily in the morning and evening., Disp: 540 tablet, Rfl: 1     hydrocortisone 1 % CREA cream, Place rectally 2 times daily as needed for itching, Disp: 28.35 g, Rfl: 1     hydrOXYzine (ATARAX) 25 MG tablet, Take 1-2 tablets every 4-6 hours as needed for pain control. (Patient not taking: Reported on 10/15/2019), Disp: 15 tablet, Rfl: 0     hydrOXYzine (ATARAX) 25 MG tablet, Take 1-2 tablets every 4-6 hours as needed for pain control. (Patient not taking: Reported on 10/15/2019), Disp: 30 tablet, Rfl: 0     hyoscyamine (ANASPAZ/LEVSIN) 0.125 MG tablet, Take 1-2 tablets (125-250 mcg) by mouth every 4 hours as needed for cramping, Disp: 40 tablet, Rfl: 1     hypromellose (GENTEAL) 0.3 % SOLN, 1 drop every hour as needed for dry eyes , Disp: , Rfl:      ibuprofen (ADVIL/MOTRIN) 600 MG tablet, Take 600mg of ibuprofen every 6 hours x 7 days to assist in pain control.  If you are not tolerating the medication, you are ok to stop. (Patient not taking: Reported on 10/15/2019), Disp: 28 tablet, Rfl: 0     lactulose 20 GM/30ML SOLN, Take 30 mLs by mouth 3 times daily as needed (for constipation) (Patient not taking: Reported on 7/24/2019), Disp: 300 mL, Rfl: 3     lidocaine (LMX4) 4 % external cream, Apply topically once as needed for mild pain, Disp: 120 g, Rfl: 1     lidocaine (LMX4) 4 % external cream, Apply 1 Application topically once as needed for mild pain, Disp: , Rfl:      linaclotide (LINZESS) 290 MCG capsule, Take 1 capsule (290 mcg) by mouth every morning (before breakfast), Disp: 90 capsule, Rfl: 0     LORazepam (ATIVAN) 0.5 MG  tablet, Take 1 tablet (0.5 mg) by mouth every 6 hours as needed for anxiety, Disp: 10 tablet, Rfl: 0     omeprazole (PRILOSEC) 40 MG capsule, Take 1 capsule (40 mg) by mouth daily (Patient not taking: Reported on 9/17/2019), Disp: 90 capsule, Rfl: 3     ondansetron (ZOFRAN-ODT) 8 MG ODT tab, Take 1 tablet (8 mg) by mouth every 8 hours as needed for nausea, Disp: 180 tablet, Rfl: 1     order for DME, Equipment being ordered: 8 1/2 tall boot, Disp: 1 Device, Rfl: 0     order for DME, Equipment being ordered: RollAbout knee scooter x 3 months (Patient not taking: Reported on 10/15/2019), Disp: 1 Device, Rfl: 0     order for DME, Equipment being ordered: Trilok brace 8 1/2 left lower extremity (Patient not taking: Reported on 9/17/2019), Disp: 1 Device, Rfl: 0     order for DME, Roll-A-Bout Walker. Patient can use for 3 months (Patient not taking: Reported on 9/17/2019), Disp: 1 Units, Rfl: 0     order for DME, Equipment being ordered: size 8 1/2 walking boot tall, Disp: 1 Device, Rfl: 0     order for DME, Equipment being ordered: RollAbout knee scooter, Disp: 1 Device, Rfl: 0     oxyCODONE IR (ROXICODONE) 10 MG tablet, Take 1 tablets every 4-6 hours as needed for pain. (Patient not taking: Reported on 10/15/2019), Disp: 30 tablet, Rfl: 0     oxyCODONE-acetaminophen (PERCOCET) 5-325 MG tablet, Take 1-2 tablets every 4-6 hours as needed for pain.  Do not take other tylenol products with this medication, as too much tylenol can be damaging to the liver. (Patient not taking: Reported on 10/15/2019), Disp: 15 tablet, Rfl: 0     polyethylene glycol (MIRALAX/GLYCOLAX) powder, Take 1 capful by mouth 3 times daily, Disp: , Rfl:      promethazine (PHENERGAN) 12.5 MG tablet, Take 1-2 tablets (12.5-25 mg) by mouth every 6 hours as needed for nausea (Patient not taking: Reported on 7/24/2019), Disp: 30 tablet, Rfl: 3     ranitidine (ZANTAC) 150 MG tablet, Take 150 mg by mouth 2 times daily, Disp: , Rfl:      sennosides (SENOKOT)  8.6 MG tablet, Take 3 tablets by mouth 2 times daily (Patient not taking: Reported on 9/17/2019), Disp: 240 tablet, Rfl: 3     sodium chloride 0.9% SOLN BOLUS, Inject 1,000 mLs into the vein daily as needed (IV abx and flushes), Disp: 1000 mL, Rfl: 11     SPRINTEC 28 0.25-35 MG-MCG tablet, Take one tablet by mouth daily. Take continuously., Disp: 84 tablet, Rfl: 3     sucralfate (CARAFATE) 1 GM/10ML suspension, Take 10 mLs (1 g) by mouth 4 times daily, Disp: 1200 mL, Rfl: 2     triamcinolone (KENALOG) 0.5 % external ointment, Apply 1 g topically 3 times daily as needed for irritation, Disp: 15 g, Rfl: 3     Allergies   Allergen Reactions     Amoxil [Penicillins] Rash     Dad unsure of reaction.     Bee Venom Anaphylaxis     Contrast Dye Rash     Contrast [facility], 04/09/2014.; [facility], 04/09/2014.  NOTE: this is a contrast media oral with iodine. Premedicate with methylpred standard for IV contrast, request barium contrast for oral contrast.     Orange Fruit [Citrus] Anaphylaxis     Pineapple Anaphylaxis, Difficulty breathing and Rash     Reglan [Metoclopramide] Other (See Comments)     IV dose only, in ER, rapid heart rate.     Ace Inhibitors      Difficulty in breathing and GI upset     Amitiza [Lubiprostone] Nausea and Vomiting     Amoxicillin-Pot Clavulanate      Midazolam Unknown     parent states that when pt takes this medication, she wakes up being very violent .     No Clinical Screening - See Comments      Bleech/ chest tightness, itchy throat, swollen tongue, hives     Tizanidine Other (See Comments)     Confusion, back pain, photophobia, abdominal pain, shaking, anxious       Versed      Coming out of pelvic exam at age of 5, was kicking and screaming when coming out of the versed.     Adhesive Tape Rash     Azithromycin Hives and Rash     Cephalexin Itching and Rash        PHYSICAL EXAM:    Pt reports migraine headache today, rates 6/10 started 3 days ago.  Took Imitrex  yesterday without relief.    HPI:    Patient reports the following new medical problems since last visit: fever, nausea and vomiting after last injections.    We reviewed the recommended safety guidelines for  Botox from any vaccine injection, such as the seasonal flu vaccine, by a minimum of 10-14 days with Samara Oropeza. She acknowledged understanding.    RESPONSE TO PREVIOUS TREATMENT:  Change in headache pattern following last series of injections with 185 units of  Botox on 2019.    Malaise:  Rated as 'Moderate' severity.  Duration: 2 weeks resolved.    1.  Headache frequency during this injection cycle:  1 headache days per month.  This is compared to her baseline headache frequency of 39 headache days per month.     2.  Headache duration during this injection cycle:  Headache duration 3 days.     3.  Headache intensity during this injection cycle:    A.  6/10  =  Typical pain level.  B.  9/10  =  Worst pain level.  C.  0/10  =  Lowest pain level.    4.  Change in headache medication usage during this injection cycle:  (For Example:  Able to decrease use of oral pain medications.) No change in use of pain medication    5.  ER Visits During This Injection Cycle:  0    6.  Functional Performance:  Change in ADL's, social interaction, days lost from work, etc. Very difficult dental visit yesterday due to migraine headache but did not cancel any activities.      BOTULINUM NEUROTOXIN INJECTION PROCEDURES:    VERIFICATION OF PATIENT IDENTIFICATION AND PROCEDURE     Initials   Patient Name lmd   Patient  lmd   Procedure Verified by: emmy     Prior to the start of the procedure and with procedural staff participation, I verbally confirmed the patient s identity using two indicators, relevant allergies, that the procedure was appropriate and matched the consent or emergent situation, and that the correct equipment/implants were available. Immediately prior to starting the procedure I conducted  the Time Out with the procedural staff and re-confirmed the patient s name, procedure, and site/side. (The Joint Commission universal protocol was followed.)  Yes    Sedation (Moderate or Deep): None    Above assessments performed by:  Vidya Bryson RN Care Coordinator    Alejandrina Hernandez MD      INDICATIONS FOR PROCEDURES:  Samara Oropeza is a 24 year old patient with chronic migraine headaches associated with cervicogenic components.      Her baseline symptoms have been recalcitrant to oral medications and conservative therapy.  She is here today for reinjection with Botox.     GOAL OF PROCEDURE:  The goal of this procedure is to decrease pain .    TOTAL DOSE ADMINISTERED:  Dose Administered:  185 units  Botox (Botulinum Toxin Type A)       2:1 Dilution   Diluent Used:  0.25% Sensorcaine  (NDC: 55150-167-10  Lot: [id number]  Exp: 11/2021  Total Volume of Diluent Used:  4.0 ml  Lot # /C3 with Expiration Date:  3/2022  NDC #: Botox 100u ([address]-65)    Was there drug waste? Yes  Amount of drug waste (mL): 15 units Botox.  Reason for waste:  Single use vial  Multi-dose vial: No    Alejandrina Hernandez MD  October 18, 2019     Medication guide was offered to patient and was declined.    CONSENT:  The risks, benefits, and treatment options were discussed with Samara Oropeza and she agreed to proceed.    Written consent was obtained by lmd.     EQUIPMENT USED:  Needle-30 gauge    SKIN PREPARATION:  Skin preparation was performed using an alcohol wipe.     GUIDANCE DESCRIPTION:  No guidance was utilized.     AREA/MUSCLE INJECTED:  185 UNITS BOTOX = TOTAL DOSE     1 & 2. SHOULDER GIRDLE & NECK MUSCLES: 30 units Botox = Total Dose, 2:1 Dilution      Right Splenius - 10 units of Botox at 1 site/s.   Left Splenius - 10 units of Botox at 1 site/s.      Right Levator Scapulae - 5 units of Botox at 1 site/s (shoulder muscles).   Left Levator Scapulae - 5 units of Botox at 1 site/s (shoulder  muscles).     3. HEAD & SCALP MUSCLES: 155 units Botox = Total Dose, 2:1 Dilution     Right Occipitalis - 10 units of Botox at 3 site/s.   Left Occipitalis - 10 units of Botox at 3 site/s.     Right Frontalis - 15 units of Botox at 4 site/s.  Left Frontalis - 15 units of Botox at 4 site/s.     Right Temporalis - 45 units of Botox at 8 site/s.  Left Temporalis - 45 units of Botox at 8 site/s.     Right  - 5 units of Botox at 1 site/s.              Left  - 5 units of Botox at 1 site/s.                 Procerus - 5 units of Botox at 1 site/s.    RESPONSE TO PROCEDURE:  Samara Oropeza tolerated the procedure well and there were no immediate complications.   She was allowed to recover for an appropriate period of time and was discharged home in stable condition.    FOLLOW UP:  Samara Oropeza was asked to follow up by phone in 7-14 days with Marcelle Richardson PT, Care Coordinator or Vidya Dickinson RN, Care Coordinator, to report her response to this series of injections.  Based on the patient's previous response to this therapy, Samara Oropeza was rescheduled for the next series of injections in 12 weeks.    PLAN (Medication Changes, Therapy Orders, Work or Disability Issues, etc.): Given that the patient does not follow regularly with a Neurologist for her headaches, referral was placed today.  Patient will continue to monitor response to today's injections.

## 2019-10-17 NOTE — NURSING NOTE
Chief Complaint   Patient presents with     Headache     [facility] RETURN BOTOX FOR MIGRAINES       Pricila Blakely, EMT

## 2019-10-18 RX ORDER — BUPIVACAINE HYDROCHLORIDE 2.5 MG/ML
4 INJECTION, SOLUTION EPIDURAL; INFILTRATION; INTRACAUDAL ONCE
Status: COMPLETED | OUTPATIENT
Start: 2019-10-18 | End: 2019-10-18

## 2019-10-18 RX ADMIN — BUPIVACAINE HYDROCHLORIDE 10 MG: 2.5 INJECTION, SOLUTION EPIDURAL; INFILTRATION; INTRACAUDAL at 10:52

## 2019-10-19 DIAGNOSIS — F95.9 TIC: ICD-10-CM

## 2019-10-21 ENCOUNTER — PRE VISIT (OUTPATIENT)
Dept: NEUROLOGY | Facility: CLINIC | Age: 24
End: 2019-10-21

## 2019-10-21 RX ORDER — GUANFACINE 1 MG/1
TABLET ORAL
Qty: 180 TABLET | Refills: 5 | Status: SHIPPED | OUTPATIENT
Start: 2019-10-21 | End: 2019-12-31

## 2019-10-21 NOTE — TELEPHONE ENCOUNTER
Requested Prescriptions   Pending Prescriptions Disp Refills     guanFACINE (TENEX) 1 MG tablet [Pharmacy Med Name: GUANFACINE 1 MG TABLET] 180 tablet 5     Sig: TAKE 3 TABLETS (3 MG) BY MOUTH TWICE DAILY IN THE MORNING AND EVENING.  Last Written Prescription Date:  06/11/2019  Last Fill Quantity: 540,  # refills: 1   Last office visit: 9/17/2019 with prescribing provider:  09/17/2019   Future Office Visit:   Next 5 appointments (look out 90 days)    Oct 28, 2019  3:30 PM CDT  Return Visit with Ernestina Patel [facility]  [address] [place] [id number]  [phone]   Nov 05, 2019 10:15 AM CST  Return Visit with Andres Johnson DPM  [facility] ([facility]) [address]  [address]  [facility] [address]  [phone]   Nov 12, 2019 10:00 AM CST  Return Visit with Ernestina Patel [facility]  [address] [place] [id number]  [phone]   Nov 26, 2019 12:00 PM CST  Return Visit with Ernestina Patel [facility]  [address] [place] [id number]  [phone]   Dec 10, 2019 11:00 AM CST  Return Visit with Ernestina Patel [place] [address] [place] [phone]  [phone]              Antiadrenergic Antihypertensives Passed - 10/19/2019  7:32 AM        Passed - Blood pressure less than 140/90 in past 6 months     BP Readings from Last 3 Encounters:   10/17/19 121/81   10/15/19 114/82   10/08/19 110/86                 Passed - Medication is active on med list        Passed - Patient is age 18 or older        Passed - No active pregnancy on record        Passed - Normal serum creatinine on file in past 12 months     Recent  "Labs   Lab Test 09/26/19  1147   CR 0.72             Passed - No positive pregnancy test within past 12 months        Passed - Recent (6 mo) or future (30 days) visit within the authorizing provider's specialty     Patient had office visit in the last 6 months or has a visit in the next 30 days with authorizing provider or within the authorizing provider's specialty.  See \"Patient Info\" tab in inbasket, or \"Choose Columns\" in Meds & Orders section of the refill encounter.            "

## 2019-10-21 NOTE — TELEPHONE ENCOUNTER
FUTURE VISIT INFORMATION      FUTURE VISIT INFORMATION:    Date: 11/6/2019    Time: 11AM    [place] [facility]  REFERRAL INFORMATION:    Referring provider:  Dr. Hernandez    Referring providers clinic:  [facility]     Reason for visit/diagnosis  Migraines     RECORDS REQUESTED FROM:       Clinic name Comments Records Status Imaging Status   [facility]  Care Everywhere N/A    [facility]  Care Everywhere N/A    [facility]  Care Everywhere N/A

## 2019-10-21 NOTE — TELEPHONE ENCOUNTER
Prescription approved per [facility] [place].    Cristy Hull RN  [facility]

## 2019-10-25 DIAGNOSIS — F41.1 GAD (GENERALIZED ANXIETY DISORDER): ICD-10-CM

## 2019-10-25 NOTE — TELEPHONE ENCOUNTER
Last script in [place]  was written by Dr. Roberts and was filled on 9/29/19 . Routed to PCP for review. Kat Michaels RN October 25, 2019 11:06 AM

## 2019-10-25 NOTE — TELEPHONE ENCOUNTER
Controlled Substance Refill Request for GABAPENTIN 100 MG CAPSULE  Problem List Complete:    No     PROVIDER TO CONSIDER COMPLETION OF PROBLEM LIST AND OVERVIEW/CONTROLLED SUBSTANCE AGREEMENT    Last Written Prescription Date:  08/15/2019  Last Fill Quantity: 90,   # refills: 1    THE MOST RECENT OFFICE VISIT MUST BE WITHIN THE PAST 3 MONTHS. AT LEAST ONE FACE TO FACE VISIT MUST OCCUR EVERY 6 MONTHS. ADDITIONAL VISITS CAN BE VIRTUAL.  (THIS STATEMENT SHOULD BE DELETED.)    [place] [facility] [place]: 09/17/2019    Future Office visit:   Next 5 appointments (look out 90 days)    Oct 28, 2019  3:30 PM CDT  Return Visit with Ernestina Patel [facility] ([facility]  [address] [facility]  [facility] [address]  [phone]   Nov 05, 2019 10:15 AM CST  Return Visit with Andres Johnson DPM  [facility] ([facility]) [address]  [address]  [facility] [address]  [phone]   Nov 12, 2019 10:00 AM CST  Return Visit with Ernestina Patel [facility] ([facility]  [address] [facility]  [facility] [address]  [phone]   Nov 26, 2019 12:00 PM CST  Return Visit with Ernestina Patel [facility]  [address]  [facility] [address]  [phone]   Dec 10, 2019 11:00 AM CST  Return Visit with Ernestina Patel [facility] ([facility]  [address] [place], [place] [id number]  [facility] [address]  [phone]          Controlled substance agreement:   Encounter-Level CSA:    There are no encounter-level csa.     Patient-Level CSA:    There are no patient-level csa.         Last Urine Drug Screen: No results found for: CDAUT, No results found for: COMDAT,   Cannabinoids (73-dgx-1-carboxy-9-THC)   Date Value Ref  Range Status   05/01/2018 Detected, Abnormal Result (A) NDET^Not Detected ng/mL Final     Comment:     Cutoff for a positive cannabinoid is greater than 50 ng/ml.  This is an unconfirmed screening result to be used for medical purposes only.   Order [id number] for confirmation or individual confirmation tests to [facility].       Phencyclidine (Phencyclidine)   Date Value Ref Range Status   05/01/2018 Not Detected NDET^Not Detected ng/mL Final     Comment:     Cutoff for a negative PCP is 25 ng/mL or less.     Cocaine (Benzoylecgonine)   Date Value Ref Range Status   05/01/2018 Not Detected NDET^Not Detected ng/mL Final     Comment:     Cutoff for a negative cocaine is 150 ng/ml or less.     Methamphetamine (d-Methamphetamine)   Date Value Ref Range Status   05/01/2018 Not Detected NDET^Not Detected ng/mL Final     Comment:     Cutoff for a negative methamphetamine is 500 ng/ml or less.     Opiates (Morphine)   Date Value Ref Range Status   05/01/2018 Not Detected NDET^Not Detected ng/mL Final     Comment:     Cutoff for a negative opiate is 100 ng/ml or less.     Amphetamine (d-Amphetamine)   Date Value Ref Range Status   05/01/2018 Not Detected NDET^Not Detected ng/mL Final     Comment:     Cutoff for a negative amphetamine is 500 ng/mL or less.     Benzodiazepines (Nordiazepam)   Date Value Ref Range Status   05/01/2018 Not Detected NDET^Not Detected ng/mL Final     Comment:     Cutoff for a negative benzodiazepine is 150 ng/ml or less.     Tricyclic Antidepressants (Desipramine)   Date Value Ref Range Status   05/01/2018 Detected, Abnormal Result (A) NDET^Not Detected ng/mL Final     Comment:     Cutoff for a positive tricyclic antidepressant is greater than 300 ng/ml.  This is an unconfirmed screening result to be used for medical purposes only.   Order [id number] for confirmation or individual confirmation tests to [facility].       Methadone (Methadone)   Date Value Ref Range Status   05/01/2018 Not Detected NDET^Not  Detected ng/mL Final     Comment:     Cutoff for a negative methadone is 200 ng/ml or less.     Barbiturates (Butalbital)   Date Value Ref Range Status   05/01/2018 Not Detected NDET^Not Detected ng/mL Final     Comment:     Cutoff for a negative barbituate is 200 ng/ml or less.     Oxycodone (Oxycodone)   Date Value Ref Range Status   05/01/2018 Not Detected NDET^Not Detected ng/mL Final     Comment:     Cutoff for a negative Oxycodone is 100 ng/mL or less.     Propoxyphene (Norpropoxyphene)   Date Value Ref Range Status   05/01/2018 Not Detected NDET^Not Detected ng/mL Final     Comment:     Cutoff for a negative propoxyphene is 300 ng/ml or less     Buprenorphine (Buprenorphine)   Date Value Ref Range Status   05/01/2018 Not Detected NDET^Not Detected ng/mL Final     Comment:     Cutoff for a negative buprenorphine is 10 ng/ml or less        Processing:  Fax Rx to [facility] pharmacy     [email or web address]       checked in past 3 months?  No, route to RN

## 2019-10-28 ENCOUNTER — OFFICE VISIT (OUTPATIENT)
Dept: PSYCHOLOGY | Facility: CLINIC | Age: 24
End: 2019-10-28
Payer: COMMERCIAL

## 2019-10-28 DIAGNOSIS — F41.1 GAD (GENERALIZED ANXIETY DISORDER): Primary | ICD-10-CM

## 2019-10-28 DIAGNOSIS — F32.A DEPRESSION: ICD-10-CM

## 2019-10-28 PROCEDURE — 90834 PSYTX W PT 45 MINUTES: CPT | Performed by: COUNSELOR

## 2019-10-28 ASSESSMENT — ANXIETY QUESTIONNAIRES
5. BEING SO RESTLESS THAT IT IS HARD TO SIT STILL: MORE THAN HALF THE DAYS
IF YOU CHECKED OFF ANY PROBLEMS ON THIS QUESTIONNAIRE, HOW DIFFICULT HAVE THESE PROBLEMS MADE IT FOR YOU TO DO YOUR WORK, TAKE CARE OF THINGS AT HOME, OR GET ALONG WITH OTHER PEOPLE: SOMEWHAT DIFFICULT
2. NOT BEING ABLE TO STOP OR CONTROL WORRYING: MORE THAN HALF THE DAYS
6. BECOMING EASILY ANNOYED OR IRRITABLE: SEVERAL DAYS
1. FEELING NERVOUS, ANXIOUS, OR ON EDGE: SEVERAL DAYS
7. FEELING AFRAID AS IF SOMETHING AWFUL MIGHT HAPPEN: SEVERAL DAYS
3. WORRYING TOO MUCH ABOUT DIFFERENT THINGS: MORE THAN HALF THE DAYS
GAD7 TOTAL SCORE: 12

## 2019-10-28 ASSESSMENT — PATIENT HEALTH QUESTIONNAIRE - PHQ9
5. POOR APPETITE OR OVEREATING: NEARLY EVERY DAY
SUM OF ALL RESPONSES TO PHQ QUESTIONS 1-9: 10
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
## 2019-10-29 ASSESSMENT — ANXIETY QUESTIONNAIRES: GAD7 TOTAL SCORE: 12

## 2019-10-30 ENCOUNTER — OFFICE VISIT (OUTPATIENT)
Dept: NEUROLOGY | Facility: CLINIC | Age: 24
End: 2019-10-30
Attending: PSYCHIATRY & NEUROLOGY
Payer: COMMERCIAL

## 2019-10-30 VITALS
HEIGHT: 63 IN | SYSTOLIC BLOOD PRESSURE: 114 MMHG | BODY MASS INDEX: 27.81 KG/M2 | HEART RATE: 91 BPM | DIASTOLIC BLOOD PRESSURE: 76 MMHG

## 2019-10-30 DIAGNOSIS — R41.89 SPELL OF ALTERED COGNITION: Primary | ICD-10-CM

## 2019-10-30 ASSESSMENT — PAIN SCALES - GENERAL: PAINLEVEL: NO PAIN (0)
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
## 2019-10-30 NOTE — PATIENT INSTRUCTIONS
Will get a MRI and EEG now    Will have you follow up in a couple of months with generally neurology

## 2019-11-04 ENCOUNTER — MYC MEDICAL ADVICE (OUTPATIENT)
Dept: PODIATRY | Facility: CLINIC | Age: 24
End: 2019-11-04

## 2019-11-04 ENCOUNTER — TELEPHONE (OUTPATIENT)
Dept: RHEUMATOLOGY | Facility: CLINIC | Age: 24
End: 2019-11-04

## 2019-11-04 DIAGNOSIS — R10.84 ABDOMINAL PAIN, GENERALIZED: ICD-10-CM

## 2019-11-04 RX ORDER — COLCHICINE 0.6 MG/1
0.6 TABLET ORAL 2 TIMES DAILY
Qty: 180 TABLET | Refills: 1 | Status: SHIPPED | OUTPATIENT
Start: 2019-11-04 | End: 2022-10-28

## 2019-11-04 RX ORDER — GABAPENTIN 100 MG/1
CAPSULE ORAL
Qty: 90 CAPSULE | Refills: 1 | Status: SHIPPED | OUTPATIENT
Start: 2019-11-04 | End: 2019-11-26

## 2019-11-04 NOTE — TELEPHONE ENCOUNTER
Per Dr. Dubose last note on 10/15, the incisions were healed and sutures removed. That was a month ago.    Did something happen to the incision area in the last month? Did they just recently open?    I can't really order an antibiotic without seeing it as again, per note a month ago the incision was closed and healed.     I recommend she be seen as soon as possible such as urgent care  If she can not get into see Dr. Johnson.    CHRISTINA AvilesM

## 2019-11-04 NOTE — TELEPHONE ENCOUNTER
Left voicemail recommending patient go to urgent care tonight due to concern for infection. Provided triage number for further concerns.     BRIDGER Juares RN

## 2019-11-04 NOTE — TELEPHONE ENCOUNTER
I called and spoke to patient. Patient states that there was still a small little opening in the incision and nothing occurred to the site. Patient was advised to go to urgent care as soon as possible today and then follow up with Dr. Johnson as scheduled for Thursday. Patient verbalized understanding to me and okay with this plan.    Shara JAUREGUI CMA

## 2019-11-04 NOTE — TELEPHONE ENCOUNTER
Sonja Abreu CNP,  Routing refill request to provider for review/approval because:  Labs not current:  Uric acid level not on file    Would you like office visit or lab only to update bloodwork?    Uric acid level cued    Thank You!  Ksenia Medina, JESSY  Triage Nurse

## 2019-11-04 NOTE — TELEPHONE ENCOUNTER
Please see other [employer] message dated 11/4/19. Closing encounter.     BRIDGER Juares RN

## 2019-11-04 NOTE — TELEPHONE ENCOUNTER
Spoke to patient to offer her an appointment in 11/27/2019, however, patient declined to be seen in the [facility]ology [facility].   Kimberly Nettles CMA   11/4/2019 10:28 AM

## 2019-11-04 NOTE — TELEPHONE ENCOUNTER
Please see [email or web address] message.     Phone call to patient. She is unable to make her appointment with Dr. Johnson tomorrow and she has transportation issues. She states there is dark yellow and sticky drainage coming from incision that turned clear. Incision is warm to the touch. There is redness approximately 1/4 to 1/2 inches extending around the whole entire incision and then back up toward her foot.   She is also having increased pain.   Recommended that she be seen as soon as possible. Dr. Johnson is out of the office today.   She has transportation issues and could not get to an appointment until after 3:30 so unable to make latest appointment available with Dr. Penny today. No other openings available today. Offered pm appointment with Dr. Johnson for tomorrow and times on Wednesday do not work for her. She prefers to see Dr. Johnson since he has done her past 3 surgeries. Appointment scheduled for 11/7/19.     Will discuss current symptoms with oncall provider in Dr. Johnson's absence and get back with her.     She can be reached at: [phone]  Ok to leave message : YES    Please advise if patient should go to urgent care or other recommendations. Pharmacy set up in [place] if needed.     BRIDGER Juares RN

## 2019-11-04 NOTE — TELEPHONE ENCOUNTER
"Requested Prescriptions   Pending Prescriptions Disp Refills     colchicine (COLCYRS) 0.6 MG tablet [Pharmacy Med Name: COLCHICINE 0.6 MG TABLET] 180 tablet 0     Sig: TAKE 1 TABLET (0.6 MG) BY MOUTH 2 TIMES DAILY       Gout Agents Protocol Failed - 11/4/2019  4:32 PM        Failed - Has Uric Acid on file in past 12 months and value is less than 6     No lab results found.  If level is 6mg/dL or greater, ok to refill one time and refer to provider.           Passed - CBC on file in past 12 months     Recent Labs   Lab Test 09/26/19  1147   WBC 7.9   RBC 4.31   HGB 11.6*   HCT 38.6                    Passed - ALT on file in past 12 months     Recent Labs   Lab Test 09/17/19  1058   ALT 23             Passed - Recent (12 mo) or future (30 days) visit within the authorizing provider's specialty     Patient has had an office visit with the authorizing provider or a provider within the authorizing providers department within the previous 12 mos or has a future within next 30 days. See \"Patient Info\" tab in inbasket, or \"Choose Columns\" in Meds & Orders section of the refill encounter.              Passed - Medication is active on med list        Passed - Patient is age 18 or older        Passed - No active pregnancy on record        Passed - Normal serum creatinine on file in the past 12 months     Recent Labs   Lab Test 09/26/19  1147   CR 0.72             Passed - No positive pregnancy test in past year        "

## 2019-11-05 NOTE — PROGRESS NOTES
[facility]  Headache Consult    11/5/2019      Samara Oropeza MRN# [id number]   YOB: 1995 Age: 24 year old      Primary care provider:   Sonja Abreu    Requesting provider:   Alejandrina Hernandez    Reason for Consult:  Headache      HISTORY OF PRESENT ILLNESS:  Samara Oropeza is a 24 year old right-handed woman with a past medical history significant for spells without EEG corroboration who presents for evaluation of headaches.  She has previously been seen in our clinic by Gaudencio Flores for seizure evaluation and Dr. 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  She was most recently seen in the clinic on 10- for a spell of syncope.  And MRI and EEG were ordered.      Headaches started in 8th grade but they turned into migraines when she was 20 years old.  She started getting Botox injections about two years ago.  In general the Botox injections are helpful about 75% in terms of headache frequency (used to be daily headaches) but with this last set of injections on October 17th with 185-units, she had more injection site tenderness and the injections did not help as much.    She has had 45 head-ache free days in the last 60 days.  This means that she has had 15 days of headache in the last 2 months. The main triggers are fluorescent lights but she usually wakes up with headaches along with tightness of the neck and shoulders, worse on the left side.  There is no visual aura.  Headaches wake her up in the middle of the night. She gets light and sound sensitive.  There is not typically nausea.  No ear fullness she has high pitched bilateral ringing in the ears.  She does experience some vertigo spells, generally with the tinnitus.  The floor will look like it is moving.  She had an episode where the room looked like it was getting dark.     She typically does not take anything for the headaches, which are usually about 5-6/10.  They get to be a  10/10 about 4 days a month for those.  She might take an Exedrin migraine, which does help.  She does not think that sumatriptan at 25mg was helpful. She does not recall taking any other oral medications.      She also experiences bilateral finger tingling and hand numbness, worse on the right side.  This is worse when sitting up and using the arms and better when she lies down.  She does not perceive any association between the head symptoms and the arm symptoms.       She's had recurrent spells over the last 3 years in which she has a pro-dome of internal tremulousness, vision going tunnel, with tinnitus, arms and legs feeling like 'jello,' sweating, feeling of breathlessness, abdominal squeezing feeling, sometimes headache, nausea, eyes rolling back, all preceding her becoming unresponsive for 45-seconds to 2-minutes.  When she recovers she is responsive right away but she is thirsty and very tired.  She might take a nap for a few hours.     Her other concurrent important issue is that she has an infection in the left foot and ankle for an persistent Staph infection, recently in the ER for oozing.  She was just put on Clindamycin.  The ankle was injured when she fell during one of these spells of unresponsiveness.     12-19 to 12-  Video EEG monitoring  IMPRESSION VIDEO EEG DAY #1: This continuous EEG monitoring procedure was normal during wakefulness, drowsiness and sleep.  Two spells were noted by the patient and her family, one where she was staring with her head dropping down and then moved into the fetal position and another where she was staring, her head dropped down and then she later on developed some brief right hand tremor.  None of these spells had any associated EEG change and were therefore not consistent with electrographic seizures.  Clinical correlation is required.     IMPRESSION VIDEO EEG DAY #2:  This EEG monitoring procedure was normal during wakefulness, drowsiness and sleep.  No  "focal slowing and no epileptiform discharges were seen at any time. One spell occurred while ambulating in the harness. The patient was off camera at the onset, but did later report this was a typical event for her with a numb, prickly feeling in her legs and then a subsequent feeling of disorientation.  No ictal changes were seen on the EEG throughout this entire spell.  No electrographic seizures were seen at any time on this date of monitoring.  Clinical correlation is advised.     IMPRESSION OF VIDEO EEG DAY #3: This is a normal awake and sleep video electroencephalogram. No electrographic seizures or epileptiform discharges were recorded. During hyperventilation she felt funny, she had \"prickly sensation in the hands and feet, legs were numb, and lots of pressure in the head\". These was a target event. During this time there was no EEG seizure activity. This was thought to be related to hyperventilation .  No electrographic seizure or epileptiform discharges were seen.      12-  Tilt table EEG  IMPRESSION:  Video EEG outside of the tilt maneuver was within normal limits.  The patient was given nitroglycerin and put upright during which time she was anxious, had difficulty breathing, and appeared to have a syncopal spell.  The EEG during this time had some background slowing, but no electrographic seizure was seen and patient was tachycardic.  During this tilt, there were no electrographic seizures accompanied with her spell.   GUZMAN GARCIA MD         PAST MEDICAL HISTORY:  Past Medical History:   Diagnosis Date     Anemia      Anxiety      Arthritis      Behcet's disease (H)      Cervical adenitis May 2010     Chronic abdominal pain      Constipation, chronic 1994     Fibromyalgia      Gastro-oesophageal reflux disease      Gastroparesis      Irregular heart beat     tachycardia, has had workup     Migraines      Neuromuscular disorder (H)     fibramyalgia     Palpitations      PONV (postoperative " nausea and vomiting)      Seizure (H)      Seizures (H)     unknown etiology     Syncope      Tourette's         PAST SURGICAL HISTORY:  Past Surgical History:   Procedure Laterality Date     ARTHROSCOPY ANKLE, OPEN REPAIR LIGAMENT, COMBINED Left 9/25/2019    Procedure: LEFT ANKLE ARTHROSCOPY WITH LIGAMENT REPAIR;  Surgeon: Andres Johnson DPM;  Location: [facility]     ARTHROSCOPY ANKLE, REPAIR LIGAMENT Left 1/2/2019    Procedure: Ankle arthroscopy and sinus tarsi evacuation, ligament repair, left lower extremity;  Surgeon: Andres Johnson DPM;  Location: [facility] OR     ARTHROSCOPY KNEE WITH PATELLAR REALIGNMENT  7/25/2013    Procedure: ARTHROSCOPY KNEE WITH PATELLAR REALIGNMENT;  Left Knee Arthroscopy, Medial Patellofemoral Ligament Reconstruction with Allograft  ;  Surgeon: Jennifer Acevedo MD;  Location: [facility]     COLONOSCOPY  2015     DENTAL SURGERY  1996    Teeth removal     ENDOSCOPY UPPER, COLONOSCOPY, COMBINED  2005      ESOPH/GAS REFLUX TEST W NASAL IMPED >1 HR N/A 2/15/2017    Procedure: ESOPHAGEAL IMPEDENCE FUNCTION TEST WITH 24 HOUR PH GREATER THAN 1 HOUR;  Surgeon: Timothy Matta MD;  Location: [facility]     IR PICC PLACEMENT > 5 YRS OF AGE  3/13/2019     IRRIGATION AND DEBRIDEMENT FOOT, COMBINED Left 3/12/2019    Procedure: COMBINED IRRIGATION AND DEBRIDEMENT LEFT ANKLE;  Surgeon: Micha Glover MD;  Location: [facility]     IRRIGATION AND DEBRIDEMENT LOWER EXTREMITY, COMBINED Left 5/7/2019    Procedure: 1.  Excision of wound down to and including deep fascia, less than 20 cm2.  2.  Irrigation and debridement, left ankle.;  Surgeon: Andres Johnson DPM;  Location: [facility] OR     PICC INSERTION Left 05/05/2019    4Fr - 45cm (5cm external), Basilic vein, SVC RA junction        SOCIAL HISTORY:  Social History     Socioeconomic History     Marital status: Single     Spouse name: Not on file     Number of children: Not on file     Years of education: Not on file     Highest education  "level: Not on file   Occupational History     Not on file   Social Needs     Financial resource strain: Not on file     Food insecurity:     Worry: Not on file     Inability: Not on file     Transportation needs:     Medical: Not on file     Non-medical: Not on file   Tobacco Use     Smoking status: Never Smoker     Smokeless tobacco: Never Used   Substance and Sexual Activity     Alcohol use: Yes     Alcohol/week: 1.0 standard drinks     Types: 1 Standard drinks or equivalent per week     Comment: rarely     Drug use: Yes     Types: Marijuana     Comment: occassional marijuana only, denies others     Sexual activity: Not Currently     Partners: Male     Birth control/protection: Pill   Lifestyle     Physical activity:     Days per week: Not on file     Minutes per session: Not on file     Stress: Not on file   Relationships     Social connections:     Talks on phone: Not on file     Gets together: Not on file     Attends Worship service: Not on file     Active member of club or organization: Not on file     Attends meetings of clubs or organizations: Not on file     Relationship status: Not on file     Intimate partner violence:     Fear of current or ex partner: Not on file     Emotionally abused: Not on file     Physically abused: Not on file     Forced sexual activity: Not on file   Other Topics Concern     Parent/sibling w/ CABG, MI or angioplasty before 65F 55M? Not Asked   Social History Narrative    Boyfriend of 5 years, living with \"in laws\" Oct 2017 and looking forward to their own apartment.         FAMILY HISTORY:  Family History   Problem Relation Age of Onset     Depression Mother      Neurologic Disorder Mother         Migraines, take imitrex injection.  Also in maternal grandmother.       Alcohol/Drug Father      Hypertension Father      Depression Father      Osteoarthritis Father      Cardiovascular Maternal Grandmother      Depression Maternal Grandmother      Hypertension Maternal Grandmother  "     Alzheimer Disease Maternal Grandmother      Cardiovascular Maternal Grandfather      Hypertension Maternal Grandfather      Depression Maternal Grandfather      Alcohol/Drug Maternal Grandfather      Cardiovascular Paternal Grandmother      Hypertension Paternal Grandmother      Cardiovascular Paternal Grandfather      Hypertension Paternal Grandfather      Glaucoma No family hx of      Macular Degeneration No family hx of      MEDICATIONS:    Current Outpatient Medications:      albuterol (PROAIR HFA/PROVENTIL HFA/VENTOLIN HFA) 108 (90 BASE) MCG/ACT Inhaler, Inhale 2 puffs into the lungs every 6 hours as needed for shortness of breath / dyspnea or wheezing, Disp: 1 Inhaler, Rfl: 1     amitriptyline (ELAVIL) 25 MG tablet, Take 1 tablet (25 mg) by mouth At Bedtime, Disp: 90 tablet, Rfl: 1     artificial tears OINT ophthalmic ointment, 0.5 inch strip each eye at night, Disp: 1 Tube, Rfl: 11     aspirin (ASPIRIN CHILDRENS) 81 MG chewable tablet, Take 81 mg by mouth as needed , Disp: , Rfl:      benzocaine (TOPICALE XTRA) 20 % GEL, Apply as needed locally to mouth or nasal ulcers for pain; 4 times daily as needed, Disp: 30 g, Rfl: 1     betamethasone valerate (VALISONE) 0.1 % cream, Apply topically 2 times daily as needed , Disp: , Rfl:      bisacodyl (DULCOLAX) 5 MG EC tablet, Take 2 tablets (10 mg) by mouth daily as needed for constipation, Disp: 30 tablet, Rfl: 0     citalopram (CELEXA) 20 MG tablet, Take 1 tablet (20 mg) by mouth daily, Disp: 90 tablet, Rfl: 1     clindamycin (CLEOCIN) 300 MG capsule, Take 300 mg by mouth 3 times daily, Disp: , Rfl:      clobetasol (TEMOVATE) 0.05 % cream, Apply topically 2 times daily, Disp: 60 g, Rfl: 0     colchicine (COLCYRS) 0.6 MG tablet, TAKE 1 TABLET (0.6 MG) BY MOUTH 2 TIMES DAILY, Disp: 180 tablet, Rfl: 1     cyproheptadine (PERIACTIN) 4 MG tablet, Take 2 tablets (8 mg) by mouth At Bedtime For nightmares, Disp: 180 tablet, Rfl: 2     dicyclomine (BENTYL) 20 MG tablet,  Take 1 tablet (20 mg) by mouth 4 times daily as needed, Disp: 120 tablet, Rfl: 3     diphenhydrAMINE (BENADRYL) 25 MG tablet, Take 1 tablet (25 mg) by mouth 3 times daily as needed (itching), Disp: 40 tablet, Rfl: 1     EPINEPHrine (EPIPEN 2-EAMON) 0.3 MG/0.3ML injection, Inject 0.3 mLs (0.3 mg) into the muscle as needed for anaphylaxis, Disp: 0.6 mL, Rfl: 3     gabapentin (NEURONTIN) 100 MG capsule, TAKE 1 CAPSULE (100 MG) BY MOUTH 3 TIMES DAILY AS NEEDED FOR ANXIETY, Disp: 90 capsule, Rfl: 1     guanFACINE (TENEX) 1 MG tablet, TAKE 3 TABLETS (3 MG) BY MOUTH TWICE DAILY IN THE MORNING AND EVENING., Disp: 180 tablet, Rfl: 5     hydrocortisone 1 % CREA cream, Place rectally 2 times daily as needed for itching, Disp: 28.35 g, Rfl: 1     hydrOXYzine (ATARAX) 25 MG tablet, Take 1-2 tablets every 4-6 hours as needed for pain control., Disp: 30 tablet, Rfl: 0     hyoscyamine (ANASPAZ/LEVSIN) 0.125 MG tablet, Take 1-2 tablets (125-250 mcg) by mouth every 4 hours as needed for cramping, Disp: 40 tablet, Rfl: 1     hypromellose (GENTEAL) 0.3 % SOLN, 1 drop every hour as needed for dry eyes , Disp: , Rfl:      lactulose 20 GM/30ML SOLN, Take 30 mLs by mouth 3 times daily as needed (for constipation), Disp: 300 mL, Rfl: 3     lidocaine (LMX4) 4 % external cream, Apply topically once as needed for mild pain, Disp: 120 g, Rfl: 1     linaclotide (LINZESS) 290 MCG capsule, Take 1 capsule (290 mcg) by mouth every morning (before breakfast), Disp: 90 capsule, Rfl: 0     LORazepam (ATIVAN) 0.5 MG tablet, Take 1 tablet (0.5 mg) by mouth every 6 hours as needed for anxiety, Disp: 10 tablet, Rfl: 0     ondansetron (ZOFRAN-ODT) 8 MG ODT tab, Take 1 tablet (8 mg) by mouth every 8 hours as needed for nausea, Disp: 180 tablet, Rfl: 1     order for DME, Equipment being ordered: 8 1/2 tall boot, Disp: 1 Device, Rfl: 0     order for DME, Equipment being ordered: RollAbout knee scooter x 3 months, Disp: 1 Device, Rfl: 0     order for DME,  Equipment being ordered: Trilok brace 8 1/2 left lower extremity, Disp: 1 Device, Rfl: 0     order for DME, Roll-A-Bout Walker. Patient can use for 3 months, Disp: 1 Units, Rfl: 0     order for DME, Equipment being ordered: size 8 1/2 walking boot tall, Disp: 1 Device, Rfl: 0     order for DME, Equipment being ordered: RollAbout knee scooter, Disp: 1 Device, Rfl: 0     polyethylene glycol (MIRALAX/GLYCOLAX) powder, Take 1 capful by mouth 3 times daily, Disp: , Rfl:      promethazine (PHENERGAN) 12.5 MG tablet, Take 1-2 tablets (12.5-25 mg) by mouth every 6 hours as needed for nausea, Disp: 30 tablet, Rfl: 3     sodium chloride 0.9% SOLN BOLUS, Inject 1,000 mLs into the vein daily as needed (IV abx and flushes), Disp: 1000 mL, Rfl: 11     SPRINTEC 28 0.25-35 MG-MCG tablet, Take one tablet by mouth daily. Take continuously., Disp: 84 tablet, Rfl: 3     sucralfate (CARAFATE) 1 GM/10ML suspension, Take 10 mLs (1 g) by mouth 4 times daily, Disp: 1200 mL, Rfl: 2     triamcinolone (KENALOG) 0.5 % external ointment, Apply 1 g topically 3 times daily as needed for irritation, Disp: 15 g, Rfl: 3     hydrOXYzine (ATARAX) 25 MG tablet, Take 1-2 tablets every 4-6 hours as needed for pain control. (Patient not taking: Reported on 11/6/2019), Disp: 15 tablet, Rfl: 0     lidocaine (LMX4) 4 % external cream, Apply 1 Application topically once as needed for mild pain, Disp: , Rfl:      omeprazole (PRILOSEC) 40 MG capsule, Take 1 capsule (40 mg) by mouth daily (Patient not taking: Reported on 11/6/2019), Disp: 90 capsule, Rfl: 3      ALLERGIES:     Allergies   Allergen Reactions     Amoxil [Penicillins] Rash     Dad unsure of reaction.     Bee Venom Anaphylaxis     Bioflavonoids Anaphylaxis     Contrast Dye Rash     Contrast Media Ready-Box [facility] [place] 04/09/2014.; Contrast Media Ready-[facility] [place], 04/09/2014.  NOTE: this is a contrast media oral with iodine. Premedicate with methylpred standard for IV contrast, request barium contrast for  oral contrast.     Diagnostic X-Ray Materials Hives and Rash     Contrast Media [place] [place] Contrast Media [place] [place].  NOTE: this is a contrast media oral with iodine. Premedicate with methylpred standard for IV contrast, request barium contrast for oral contrast.     Orange Fruit [Citrus] Anaphylaxis     Pineapple Anaphylaxis, Difficulty breathing and Rash     Reglan [Metoclopramide] Other (See Comments)     IV dose only, in ER, rapid heart rate.     Ace Inhibitors      Difficulty in breathing and GI upset     Amitiza [Lubiprostone] Nausea and Vomiting     Amoxicillin-Pot Clavulanate      Midazolam Unknown     parent states that when pt takes this medication, she wakes up being very violent .     No Clinical Screening - See Comments      Bleech/ chest tightness, itchy throat, swollen tongue, hives     Tizanidine Other (See Comments)     Confusion, back pain, photophobia, abdominal pain, shaking, anxious       Versed      Coming out of pelvic exam at age of 5, was kicking and screaming when coming out of the versed.     Adhesive Tape Rash     Azithromycin Hives and Rash     Cephalexin Itching and Rash     PHYSICAL EXAM:  Vitals: /77 (BP Location: Left arm, Patient Position: Sitting, Cuff Size: Adult Regular)   Pulse 106   SpO2 98%     General: Patient is well-nourished, well-groomed, in no apparent distress    HEENT: Head is atraumatic, eyes look normal exteriorly, throat clear, neck supple.  CARD: Regular rate and rhythm.  No murmurs.  RESP: Cleart to auscultation  ABD: Soft, non-tender  Ext: Warm, well-perfused. Left foot in a boot. Both hands are puffy. Color normal.  Veins in the hands are very dilated, especially the left hand.  Pulses are weak bilaterally.  She is using a knee walker.    Neurologic:  Mental Status: Alert and oriented to person, place, time, and situation.  Able to provide and excellent history.     Cranial Nerves: Visual fields full to confrontation.   Normal fundoscopic exam.  Pupils equal and reactive to light.  Extraocular movements full.  Face sensation normal. Normal head impulse testing.  Normal hearing to finger rub. Face symmetric with normal movements. Tongue and palate midline.  Normal should elevation.      Motor: Normal bulk and tone.  No pronator drift.  Normal foot taps.  Full strength to confrontational testing.  Left leg not tested.      Sensory: Normal light touch, temperature, and vibration. Left leg not tested.      Reflexes: Biceps, Brachioradialis, Triceps, Knees, Ankles 2/4.  Left leg not tested.      Coordination: Normal finger to nose, rapid alternating movements.     Gait: Not assessed due to foot and boot.     Adson's test for Thoracic Outlet Syndrome:  Arms abducted: Numbness and tingling develop in LEFT hand only  Arms abducted head turned to the RIGHT:   Left hand get worse  Arms abducted head turned to the LEFT:   Left hand gets worse    Pain under the RIGHT clavicle that radiates to the neck:  Pain at the RIGHT 1st rib-sternum insertion site that radiates to the neck and down the arms.  Pain under the LEFT clavicle that radiates to the neck:  Pain at the LEFT 1st rib-sternum insertion site that radiates to the neck and down the arms.     Tenderness over neck, trapezii bilateral with all points radiating pain.      DATA:  All available and relevant labs, imaging, and other procedures were reviewed personally.   Brain imaging:  10-  Brain MRI without and with contrast     Provided History:  Unspecified convulsions.     Comparison:  Head MRI 11/26/2016       Technique: Sagittal T1-weighted, axial diffusion, susceptibility,  FLAIR, and oblique coronal FLAIR and T2-weighted images obtained  without intravenous contrast.      Findings:   Normal gray-white matter differentiation. No evidence of malformation  of cortical development. Normal symmetric hippocampal formations  without atrophy or T2 signal abnormality.     Minimal  nonspecific scattered cerebral white matter punctate T2  hyperintense foci, which may represent sequelae of migraine headaches,  small vessel ischemic disease, the myelination or prior  infection/inflammation. No abnormal foci of intracranial enhancement  or restricted diffusion. No intracranial hemorrhage, mass effect,  midline shift or abnormal extra axial fluid collection. Ventricles are  not enlarged. Patent major intracranial vascular flow voids.     Paranasal sinuses and mastoid air cells are clear. Normal marrow  signal. Orbits are unremarkable.                                                                      Impression:  1. No epileptogenic focus identified.  2. Minimal nonspecific white matter T2 hyperintensities.     I have personally reviewed the examination and initial interpretation  and I agree with the findings.  DAVIDE AMIN MD    CBC RESULTS:   Recent Labs   Lab Test 09/26/19  1147   WBC 7.9   RBC 4.31   HGB 11.6*   HCT 38.6   MCV 90   MCH 26.9   MCHC 30.1*   RDW 14.1          Recent Labs   Lab Test 09/26/19  1147  03/21/19  1940      < >  --    POTASSIUM 3.6   < >  --    CHLORIDE 105   < >  --    SHANKAR 9.1   < >  --    CO2 23   < >  --    BUN 6*   < > 7   CR 0.72   < > 0.68   *   < >  --    TSH  --   --  0.53    < > = values in this interval not displayed.       IMPRESSIONS:  Samara Oropeza is a 24 year old female with PMH significant for chronic migraine headaches, waking her up at night, with evidence of venous congestion and thoracic outlet compression symptomatology.     Recommendations:  1. Lasix 20 BID  2. Chest CT with contrast  3. PT when fluid lower and can be better tolerated  4. F/U after imaging    60-minutes were spent in evaluation, examination, and counseling.      Answers for HPI/ROS submitted by the patient on 11/6/2019   General Symptoms: Yes  Skin Symptoms: Yes  HENT Symptoms: Yes  EYE SYMPTOMS: Yes  HEART SYMPTOMS: Yes  LUNG SYMPTOMS:  No  INTESTINAL SYMPTOMS: Yes  URINARY SYMPTOMS: No  GYNECOLOGIC SYMPTOMS: Yes  BREAST SYMPTOMS: No  SKELETAL SYMPTOMS: Yes  BLOOD SYMPTOMS: Yes  NERVOUS SYSTEM SYMPTOMS: Yes  MENTAL HEALTH SYMPTOMS: Yes  Fever: No  Loss of appetite: No  Weight loss: No  Weight gain: No  Fatigue: Yes  Excessive thirst: Yes  Feeling hot or cold when others believe the temperature is normal: Yes  Loss of height: Yes  Post-operative complications: Yes  Surgical site pain: Yes  Hallucinations: No  Change in or Loss of Energy: Yes  Hyperactivity: No  Confusion: Yes  Changes in hair: Yes  Changes in moles/birth marks: No  Itching: Yes  Rashes: Yes  Changes in nails: Yes  Ear pain: No  Ear discharge: No  Hearing loss: No  Tinnitus: Yes  Nosebleeds: Yes  Tooth pain: Yes  Gum tenderness: Yes  Bleeding gums: No  Change in taste: Yes  Change in sense of smell: No  Dry mouth: Yes  Eye pain: Yes  Vision loss: Yes  Dry eyes: Yes  Watery eyes: Yes  Eye bulging: No  Chest pain or pressure: Yes  Fast or irregular heartbeat: Yes  Pain in legs with walking: Yes  Trouble breathing while lying down: No  Fingers or toes appear blue: No  Fainting: Yes  Murmurs: No  Pacemaker: No  Edema or swelling: Yes  Wake up at night with shortness of breath: Yes  Light-headedness: Yes  Exercise intolerance: No  Heart burn or indigestion: Yes  Nausea: Yes  Vomiting: No  Abdominal pain: Yes  Bloating: Yes  Back pain: Yes  Muscle aches: Yes  Neck pain: Yes  Swollen joints: Yes  Joint pain: Yes  Anemia: Yes  Swollen glands: No  Easy bleeding or bruising: Yes  Trouble with coordination: Yes  Dizziness or trouble with balance: Yes  Fainting or black-out spells: Yes  Memory loss: Yes  Headache: Yes  Bleeding or spotting between periods: Yes  Heavy or painful periods: Yes  Irregular periods: Yes  Vaginal discharge: No  Hot flashes: Yes  Vaginal dryness: No  Genital ulcers: Yes  Reduced libido: No  Painful intercourse: No  Difficulty with sexual arousal: No  Post-menopausal  bleeding: No  Nervous or Anxious: Yes  Depression: Yes  Trouble sleeping: Yes  Trouble thinking or concentrating: Yes  Mood changes: Yes

## 2019-11-06 ENCOUNTER — OFFICE VISIT (OUTPATIENT)
Dept: NEUROLOGY | Facility: CLINIC | Age: 24
End: 2019-11-06
Attending: PHYSICAL MEDICINE & REHABILITATION
Payer: COMMERCIAL

## 2019-11-06 ENCOUNTER — HEALTH MAINTENANCE LETTER (OUTPATIENT)
Age: 24
End: 2019-11-06

## 2019-11-06 VITALS — SYSTOLIC BLOOD PRESSURE: 115 MMHG | OXYGEN SATURATION: 98 % | HEART RATE: 106 BPM | DIASTOLIC BLOOD PRESSURE: 77 MMHG

## 2019-11-06 DIAGNOSIS — R60.1 GENERALIZED EDEMA: Primary | ICD-10-CM

## 2019-11-06 DIAGNOSIS — G54.0 THORACIC OUTLET SYNDROME: ICD-10-CM

## 2019-11-06 DIAGNOSIS — G43.019 INTRACTABLE MIGRAINE WITHOUT AURA AND WITHOUT STATUS MIGRAINOSUS: ICD-10-CM

## 2019-11-06 RX ORDER — FUROSEMIDE 20 MG
20 TABLET ORAL DAILY
Qty: 60 TABLET | Refills: 3 | Status: SHIPPED | OUTPATIENT
Start: 2019-11-06 | End: 2020-07-27

## 2019-11-06 RX ORDER — CLINDAMYCIN HCL 300 MG
300 CAPSULE ORAL 3 TIMES DAILY
COMMUNITY
Start: 2019-11-04 | End: 2019-12-26

## 2019-11-06 ASSESSMENT — ENCOUNTER SYMPTOMS
BACK PAIN: 1
FEVER: 0
SYNCOPE: 1
HEADACHES: 1
WEIGHT LOSS: 0
EXERCISE INTOLERANCE: 0
HOT FLASHES: 1
DECREASED APPETITE: 0
MYALGIAS: 1
HALLUCINATIONS: 0
HEARTBURN: 1
DISTURBANCES IN COORDINATION: 1
DIZZINESS: 1
EYE PAIN: 1
LIGHT-HEADEDNESS: 1
BRUISES/BLEEDS EASILY: 1
DEPRESSION: 1
JOINT SWELLING: 1
SMELL DISTURBANCE: 0
SWOLLEN GLANDS: 0
PALPITATIONS: 1
WEIGHT GAIN: 0
INCREASED ENERGY: 1
EYE WATERING: 1
BLOATING: 1
SKIN CHANGES: 0
POLYDIPSIA: 1
DECREASED CONCENTRATION: 1
TASTE DISTURBANCE: 1
SLEEP DISTURBANCES DUE TO BREATHING: 1
NAUSEA: 1
VOMITING: 0
FATIGUE: 1
ARTHRALGIAS: 1
LEG PAIN: 1
INSOMNIA: 1
NERVOUS/ANXIOUS: 1
ABDOMINAL PAIN: 1
ORTHOPNEA: 0
DECREASED LIBIDO: 0
ALTERED TEMPERATURE REGULATION: 1
MEMORY LOSS: 1
NECK PAIN: 1
NAIL CHANGES: 1
LOSS OF CONSCIOUSNESS: 1

## 2019-11-06 ASSESSMENT — PAIN SCALES - GENERAL: PAINLEVEL: MODERATE PAIN (4)

## 2019-11-06 ASSESSMENT — HEADACHE IMPACT TEST (HIT 6)
HOW OFTEN DID HEADACHS LIMIT CONCENTRATION ON WORK OR DAILY ACTIVITY: SOMETIMES
HOW OFTEN HAVE YOU FELT FED UP OR IRRITATED BECAUSE OF YOUR HEADACHES: SOMETIMES
WHEN YOU HAVE HEADACHES HOW OFTEN IS THE PAIN SEVERE: VERY OFTEN
HOW OFTEN HAVE YOU FELT TOO TIRED TO WORK BECAUSE OF YOUR HEADACHES: VERY OFTEN
HIT6 TOTAL SCORE: 63
HOW OFTEN DO HEADACHES LIMIT YOUR DAILY ACTIVITIES: SOMETIMES
WHEN YOU HAVE A HEADACHE HOW OFTEN DO YOU WISH YOU COULD LIE DOWN: VERY OFTEN

## 2019-11-06 NOTE — PATIENT INSTRUCTIONS
Thoracic Outlet Syndrome    Recommendations:  1. Lasix 20 BID, as tolerated.  Start with one tablet in the morning then add second at 3pm if you are tolerating it well.   2. Chest CT with contrast  3. PT when fluid lower

## 2019-11-06 NOTE — LETTER
11/6/2019       RE: Samara Oropeza  [address]  [address]  [place] [address]     Dear Colleague,    Thank you for referring your patient, Samara Oropeza, to the [facility] NEUROLOGY at [facility]. Please see a copy of my visit note below.    [facility]  Headache Consult    11/5/2019      Samara Oropeza MRN# [id number]   YOB: 1995 Age: 24 year old      Primary care provider:   Sonja Abreu    Requesting provider:   Alejandrina Hernandez    Reason for Consult:  Headache      HISTORY OF PRESENT ILLNESS:  Samara Oropeza is a 24 year old right-handed woman with a past medical history significant for spells without EEG corroboration who presents for evaluation of headaches.  She has previously been seen in our clinic by Gaudencio Flores for seizure evaluation and Dr. 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  She was most recently seen in the clinic on 10- for a spell of syncope.  And MRI and EEG were ordered.      Headaches started in 8th grade but they turned into migraines when she was 20 years old.  She started getting Botox injections about two years ago.  In general the Botox injections are helpful about 75% in terms of headache frequency (used to be daily headaches) but with this last set of injections on October 17th with 185-units, she had more injection site tenderness and the injections did not help as much.    In the last month, she has had 45 head-ache free days in the last 60 days.  This means that she has had 15 days of headache in the last 2 months. The main triggers are fluorescent lights but she usually wakes up with headaches along with tightness of the neck and shoulders, worse on the left side.  There is no visual aura.  Headaches wake her up in the middle of the night. She gets light and sound sensitive.  There is not typically nausea.  No ear fullness she has high pitched bilateral  ringing in the ears.  She does experience some vertigo spells, generally with the tinnitus.  The floor will look like it is moving.  She had an episode where the room looked like it was getting dark.     She typically does not take anything for the headaches, which are usually about 5-6/10.  They get to be a 10/10 about 4 days a month for those.  She might take an Exedrin migraine, which does help.  She does not think that sumatriptan at 25mg was helpful. She does not recall taking any other oral medications.      She also experiences bilateral finger tingling and hand numbness, worse on the right side.  This is worse when sitting up and using the arms and better when she lies down.  She does not perceive any association between the head symptoms and the arm symptoms.       She's had recurrent spells over the last 3 years in which she has a pro-dome of internal tremulousness, vision going tunnel, with tinnitus, arms and legs feeling like 'jello,' sweating, feeling of breathlessness, abdominal squeezing feeling, sometimes headache, nausea, eyes rolling back, all preceding her becoming unresponsive for 45-seconds to 2-minutes.  When she recovers she is responsive right away but she is thirsty and very tired.  She might take a nap for a few hours.     Her other concurrent important issue is that she has an infection in the left foot and ankle for an persistent Staph infection, recently in the ER for oozing.  She was just put on Clindamycin.  The ankle was injured when she fell during one of these spells of unresponsiveness.     12-19 to 12-  Video EEG monitoring  IMPRESSION VIDEO EEG DAY #1: This continuous EEG monitoring procedure was normal during wakefulness, drowsiness and sleep.  Two spells were noted by the patient and her family, one where she was staring with her head dropping down and then moved into the fetal position and another where she was staring, her head dropped down and then she later on  "developed some brief right hand tremor.  None of these spells had any associated EEG change and were therefore not consistent with electrographic seizures.  Clinical correlation is required.     IMPRESSION VIDEO EEG DAY #2:  This EEG monitoring procedure was normal during wakefulness, drowsiness and sleep.  No focal slowing and no epileptiform discharges were seen at any time. One spell occurred while ambulating in the harness. The patient was off camera at the onset, but did later report this was a typical event for her with a numb, prickly feeling in her legs and then a subsequent feeling of disorientation.  No ictal changes were seen on the EEG throughout this entire spell.  No electrographic seizures were seen at any time on this date of monitoring.  Clinical correlation is advised.     IMPRESSION OF VIDEO EEG DAY #3: This is a normal awake and sleep video electroencephalogram. No electrographic seizures or epileptiform discharges were recorded. During hyperventilation she felt funny, she had \"prickly sensation in the hands and feet, legs were numb, and lots of pressure in the head\". These was a target event. During this time there was no EEG seizure activity. This was thought to be related to hyperventilation .  No electrographic seizure or epileptiform discharges were seen.      12-  Tilt table EEG  IMPRESSION:  Video EEG outside of the tilt maneuver was within normal limits.  The patient was given nitroglycerin and put upright during which time she was anxious, had difficulty breathing, and appeared to have a syncopal spell.  The EEG during this time had some background slowing, but no electrographic seizure was seen and patient was tachycardic.  During this tilt, there were no electrographic seizures accompanied with her spell.   GUZMAN GARCIA MD         PAST MEDICAL HISTORY:  Past Medical History:   Diagnosis Date     Anemia      Anxiety      Arthritis      Behcet's disease (H)      Cervical " adenitis May 2010     Chronic abdominal pain      Constipation, chronic 1994     Fibromyalgia      Gastro-oesophageal reflux disease      Gastroparesis      Irregular heart beat     tachycardia, has had workup     Migraines      Neuromuscular disorder (H)     fibramyalgia     Palpitations      PONV (postoperative nausea and vomiting)      Seizure (H)      Seizures (H)     unknown etiology     Syncope      Tourette's         PAST SURGICAL HISTORY:  Past Surgical History:   Procedure Laterality Date     ARTHROSCOPY ANKLE, OPEN REPAIR LIGAMENT, COMBINED Left 9/25/2019    Procedure: LEFT ANKLE ARTHROSCOPY WITH LIGAMENT REPAIR;  Surgeon: Andres Johnson DPM;  Location:  OR     ARTHROSCOPY ANKLE, REPAIR LIGAMENT Left 1/2/2019    Procedure: Ankle arthroscopy and sinus tarsi evacuation, ligament repair, left lower extremity;  Surgeon: Andres Johnson DPM;  Location:  OR     ARTHROSCOPY KNEE WITH PATELLAR REALIGNMENT  7/25/2013    Procedure: ARTHROSCOPY KNEE WITH PATELLAR REALIGNMENT;  Left Knee Arthroscopy, Medial Patellofemoral Ligament Reconstruction with Allograft  ;  Surgeon: Jennifer Acevedo MD;  Location: [facility]     COLONOSCOPY  2015     DENTAL SURGERY  1996    Teeth removal     ENDOSCOPY UPPER, COLONOSCOPY, COMBINED  2005     HC ESOPH/GAS REFLUX TEST W NASAL IMPED >1 HR N/A 2/15/2017    Procedure: ESOPHAGEAL IMPEDENCE FUNCTION TEST WITH 24 HOUR PH GREATER THAN 1 HOUR;  Surgeon: Timothy Matta MD;  Location: [facility]     IR PICC PLACEMENT > 5 YRS OF AGE  3/13/2019     IRRIGATION AND DEBRIDEMENT FOOT, COMBINED Left 3/12/2019    Procedure: COMBINED IRRIGATION AND DEBRIDEMENT LEFT ANKLE;  Surgeon: Micha Glover MD;  Location: [facility]     IRRIGATION AND DEBRIDEMENT LOWER EXTREMITY, COMBINED Left 5/7/2019    Procedure: 1.  Excision of wound down to and including deep fascia, less than 20 cm2.  2.  Irrigation and debridement, left ankle.;  Surgeon: Andres Johnson DPM;  Location:  OR  "    PICC INSERTION Left 05/05/2019    4Fr - 45cm (5cm external), Basilic vein, SVC RA junction        SOCIAL HISTORY:  Social History     Socioeconomic History     Marital status: Single     Spouse name: Not on file     Number of children: Not on file     Years of education: Not on file     Highest education level: Not on file   Occupational History     Not on file   Social Needs     Financial resource strain: Not on file     Food insecurity:     Worry: Not on file     Inability: Not on file     Transportation needs:     Medical: Not on file     Non-medical: Not on file   Tobacco Use     Smoking status: Never Smoker     Smokeless tobacco: Never Used   Substance and Sexual Activity     Alcohol use: Yes     Alcohol/week: 1.0 standard drinks     Types: 1 Standard drinks or equivalent per week     Comment: rarely     Drug use: Yes     Types: Marijuana     Comment: occassional marijuana only, denies others     Sexual activity: Not Currently     Partners: Male     Birth control/protection: Pill   Lifestyle     Physical activity:     Days per week: Not on file     Minutes per session: Not on file     Stress: Not on file   Relationships     Social connections:     Talks on phone: Not on file     Gets together: Not on file     Attends Hoahaoism service: Not on file     Active member of club or organization: Not on file     Attends meetings of clubs or organizations: Not on file     Relationship status: Not on file     Intimate partner violence:     Fear of current or ex partner: Not on file     Emotionally abused: Not on file     Physically abused: Not on file     Forced sexual activity: Not on file   Other Topics Concern     Parent/sibling w/ CABG, MI or angioplasty before 65F 55M? Not Asked   Social History Narrative    Boyfriend of 5 years, living with \"in laws\" Oct 2017 and looking forward to their own apartment.         FAMILY HISTORY:  Family History   Problem Relation Age of Onset     Depression Mother      Neurologic " Disorder Mother         Migraines, take imitrex injection.  Also in maternal grandmother.       Alcohol/Drug Father      Hypertension Father      Depression Father      Osteoarthritis Father      Cardiovascular Maternal Grandmother      Depression Maternal Grandmother      Hypertension Maternal Grandmother      Alzheimer Disease Maternal Grandmother      Cardiovascular Maternal Grandfather      Hypertension Maternal Grandfather      Depression Maternal Grandfather      Alcohol/Drug Maternal Grandfather      Cardiovascular Paternal Grandmother      Hypertension Paternal Grandmother      Cardiovascular Paternal Grandfather      Hypertension Paternal Grandfather      Glaucoma No family hx of      Macular Degeneration No family hx of      MEDICATIONS:    Current Outpatient Medications:      albuterol (PROAIR HFA/PROVENTIL HFA/VENTOLIN HFA) 108 (90 BASE) MCG/ACT Inhaler, Inhale 2 puffs into the lungs every 6 hours as needed for shortness of breath / dyspnea or wheezing, Disp: 1 Inhaler, Rfl: 1     amitriptyline (ELAVIL) 25 MG tablet, Take 1 tablet (25 mg) by mouth At Bedtime, Disp: 90 tablet, Rfl: 1     artificial tears OINT ophthalmic ointment, 0.5 inch strip each eye at night, Disp: 1 Tube, Rfl: 11     aspirin (ASPIRIN CHILDRENS) 81 MG chewable tablet, Take 81 mg by mouth as needed , Disp: , Rfl:      benzocaine (TOPICALE XTRA) 20 % GEL, Apply as needed locally to mouth or nasal ulcers for pain; 4 times daily as needed, Disp: 30 g, Rfl: 1     betamethasone valerate (VALISONE) 0.1 % cream, Apply topically 2 times daily as needed , Disp: , Rfl:      bisacodyl (DULCOLAX) 5 MG EC tablet, Take 2 tablets (10 mg) by mouth daily as needed for constipation, Disp: 30 tablet, Rfl: 0     citalopram (CELEXA) 20 MG tablet, Take 1 tablet (20 mg) by mouth daily, Disp: 90 tablet, Rfl: 1     clindamycin (CLEOCIN) 300 MG capsule, Take 300 mg by mouth 3 times daily, Disp: , Rfl:      clobetasol (TEMOVATE) 0.05 % cream, Apply topically 2  times daily, Disp: 60 g, Rfl: 0     colchicine (COLCYRS) 0.6 MG tablet, TAKE 1 TABLET (0.6 MG) BY MOUTH 2 TIMES DAILY, Disp: 180 tablet, Rfl: 1     cyproheptadine (PERIACTIN) 4 MG tablet, Take 2 tablets (8 mg) by mouth At Bedtime For nightmares, Disp: 180 tablet, Rfl: 2     dicyclomine (BENTYL) 20 MG tablet, Take 1 tablet (20 mg) by mouth 4 times daily as needed, Disp: 120 tablet, Rfl: 3     diphenhydrAMINE (BENADRYL) 25 MG tablet, Take 1 tablet (25 mg) by mouth 3 times daily as needed (itching), Disp: 40 tablet, Rfl: 1     EPINEPHrine (EPIPEN 2-EAMON) 0.3 MG/0.3ML injection, Inject 0.3 mLs (0.3 mg) into the muscle as needed for anaphylaxis, Disp: 0.6 mL, Rfl: 3     gabapentin (NEURONTIN) 100 MG capsule, TAKE 1 CAPSULE (100 MG) BY MOUTH 3 TIMES DAILY AS NEEDED FOR ANXIETY, Disp: 90 capsule, Rfl: 1     guanFACINE (TENEX) 1 MG tablet, TAKE 3 TABLETS (3 MG) BY MOUTH TWICE DAILY IN THE MORNING AND EVENING., Disp: 180 tablet, Rfl: 5     hydrocortisone 1 % CREA cream, Place rectally 2 times daily as needed for itching, Disp: 28.35 g, Rfl: 1     hydrOXYzine (ATARAX) 25 MG tablet, Take 1-2 tablets every 4-6 hours as needed for pain control., Disp: 30 tablet, Rfl: 0     hyoscyamine (ANASPAZ/LEVSIN) 0.125 MG tablet, Take 1-2 tablets (125-250 mcg) by mouth every 4 hours as needed for cramping, Disp: 40 tablet, Rfl: 1     hypromellose (GENTEAL) 0.3 % SOLN, 1 drop every hour as needed for dry eyes , Disp: , Rfl:      lactulose 20 GM/30ML SOLN, Take 30 mLs by mouth 3 times daily as needed (for constipation), Disp: 300 mL, Rfl: 3     lidocaine (LMX4) 4 % external cream, Apply topically once as needed for mild pain, Disp: 120 g, Rfl: 1     linaclotide (LINZESS) 290 MCG capsule, Take 1 capsule (290 mcg) by mouth every morning (before breakfast), Disp: 90 capsule, Rfl: 0     LORazepam (ATIVAN) 0.5 MG tablet, Take 1 tablet (0.5 mg) by mouth every 6 hours as needed for anxiety, Disp: 10 tablet, Rfl: 0     ondansetron (ZOFRAN-ODT) 8 MG  ODT tab, Take 1 tablet (8 mg) by mouth every 8 hours as needed for nausea, Disp: 180 tablet, Rfl: 1     order for DME, Equipment being ordered: 8 1/2 tall boot, Disp: 1 Device, Rfl: 0     order for DME, Equipment being ordered: RollAbout knee scooter x 3 months, Disp: 1 Device, Rfl: 0     order for DME, Equipment being ordered: Trilok brace 8 1/2 left lower extremity, Disp: 1 Device, Rfl: 0     order for DME, Roll-A-Bout Walker. Patient can use for 3 months, Disp: 1 Units, Rfl: 0     order for DME, Equipment being ordered: size 8 1/2 walking boot tall, Disp: 1 Device, Rfl: 0     order for DME, Equipment being ordered: RollAbout knee scooter, Disp: 1 Device, Rfl: 0     polyethylene glycol (MIRALAX/GLYCOLAX) powder, Take 1 capful by mouth 3 times daily, Disp: , Rfl:      promethazine (PHENERGAN) 12.5 MG tablet, Take 1-2 tablets (12.5-25 mg) by mouth every 6 hours as needed for nausea, Disp: 30 tablet, Rfl: 3     sodium chloride 0.9% SOLN BOLUS, Inject 1,000 mLs into the vein daily as needed (IV abx and flushes), Disp: 1000 mL, Rfl: 11     SPRINTEC 28 0.25-35 MG-MCG tablet, Take one tablet by mouth daily. Take continuously., Disp: 84 tablet, Rfl: 3     sucralfate (CARAFATE) 1 GM/10ML suspension, Take 10 mLs (1 g) by mouth 4 times daily, Disp: 1200 mL, Rfl: 2     triamcinolone (KENALOG) 0.5 % external ointment, Apply 1 g topically 3 times daily as needed for irritation, Disp: 15 g, Rfl: 3     hydrOXYzine (ATARAX) 25 MG tablet, Take 1-2 tablets every 4-6 hours as needed for pain control. (Patient not taking: Reported on 11/6/2019), Disp: 15 tablet, Rfl: 0     lidocaine (LMX4) 4 % external cream, Apply 1 Application topically once as needed for mild pain, Disp: , Rfl:      omeprazole (PRILOSEC) 40 MG capsule, Take 1 capsule (40 mg) by mouth daily (Patient not taking: Reported on 11/6/2019), Disp: 90 capsule, Rfl: 3      ALLERGIES:     Allergies   Allergen Reactions     Amoxil [Penicillins] Rash     Dad unsure of reaction.      Bee Venom Anaphylaxis     Bioflavonoids Anaphylaxis     Contrast Dye Rash     Contrast Media Ready-Box [facility].; Contrast Media Ready-Box [facility].  NOTE: this is a contrast media oral with iodine. Premedicate with methylpred standard for IV contrast, request barium contrast for oral contrast.     Diagnostic X-Ray Materials Hives and Rash     Contrast Media Ready-Box [facility] 04/09[place]2014.; Contrast Media Ready-Box [facility] 04/09/2014.  NOTE: this is a contrast media oral with iodine. Premedicate with methylpred standard for IV contrast, request barium contrast for oral contrast.     Orange Fruit [Citrus] Anaphylaxis     Pineapple Anaphylaxis, Difficulty breathing and Rash     Reglan [Metoclopramide] Other (See Comments)     IV dose only, in ER, rapid heart rate.     Ace Inhibitors      Difficulty in breathing and GI upset     Amitiza [Lubiprostone] Nausea and Vomiting     Amoxicillin-Pot Clavulanate      Midazolam Unknown     parent states that when pt takes this medication, she wakes up being very violent .     No Clinical Screening - See Comments      Bleech/ chest tightness, itchy throat, swollen tongue, hives     Tizanidine Other (See Comments)     Confusion, back pain, photophobia, abdominal pain, shaking, anxious       Versed      Coming out of pelvic exam at age of 5, was kicking and screaming when coming out of the versed.     Adhesive Tape Rash     Azithromycin Hives and Rash     Cephalexin Itching and Rash     PHYSICAL EXAM:  Vitals: /77 (BP Location: Left arm, Patient Position: Sitting, Cuff Size: Adult Regular)   Pulse 106   SpO2 98%     General: Patient is well-nourished, well-groomed, in no apparent distress    HEENT: Head is atraumatic, eyes look normal exteriorly, throat clear, neck supple.  CARD: Regular rate and rhythm.  No murmurs.  RESP: Cleart to auscultation  ABD: Soft, non-tender  Ext: Warm, well-perfused. Left foot in a boot. Both hands are puffy. Color normal.  Veins in  the hands are very dilated, especially the left hand.  Pulses are weak bilaterally.  She is using a knee walker.    Neurologic:  Mental Status: Alert and oriented to person, place, time, and situation.  Able to provide and excellent history.     Cranial Nerves: Visual fields full to confrontation.  Normal fundoscopic exam.  Pupils equal and reactive to light.  Extraocular movements full.  Face sensation normal. Normal head impulse testing.  Normal hearing to finger rub. Face symmetric with normal movements. Tongue and palate midline.  Normal should elevation.      Motor: Normal bulk and tone.  No pronator drift.  Normal foot taps.  Full strength to confrontational testing.  Left leg not tested.      Sensory: Normal light touch, temperature, and vibration. Left leg not tested.      Reflexes: Biceps, Brachioradialis, Triceps, Knees, Ankles 2/4.  Left leg not tested.      Coordination: Normal finger to nose, rapid alternating movements.     Gait: Not assessed due to foot and boot.     Adson's test for Thoracic Outlet Syndrome:  Arms abducted: Numbness and tingling develop in LEFT hand only  Arms abducted head turned to the RIGHT:   Left hand get worse  Arms abducted head turned to the LEFT:   Left hand gets worse    Pain under the RIGHT clavicle that radiates to the neck:  Pain at the RIGHT 1st rib-sternum insertion site that radiates to the neck and down the arms.  Pain under the LEFT clavicle that radiates to the neck:  Pain at the LEFT 1st rib-sternum insertion site that radiates to the neck and down the arms.     Tenderness over neck, trapezii bilateral with all points radiating pain.      DATA:  All available and relevant labs, imaging, and other procedures were reviewed personally.   Brain imaging:  10-  Brain MRI without and with contrast     Provided History:  Unspecified convulsions.     Comparison:  Head MRI 11/26/2016       Technique: Sagittal T1-weighted, axial diffusion, susceptibility,  FLAIR, and  oblique coronal FLAIR and T2-weighted images obtained  without intravenous contrast.      Findings:   Normal gray-white matter differentiation. No evidence of malformation  of cortical development. Normal symmetric hippocampal formations  without atrophy or T2 signal abnormality.     Minimal nonspecific scattered cerebral white matter punctate T2  hyperintense foci, which may represent sequelae of migraine headaches,  small vessel ischemic disease, the myelination or prior  infection/inflammation. No abnormal foci of intracranial enhancement  or restricted diffusion. No intracranial hemorrhage, mass effect,  midline shift or abnormal extra axial fluid collection. Ventricles are  not enlarged. Patent major intracranial vascular flow voids.     Paranasal sinuses and mastoid air cells are clear. Normal marrow  signal. Orbits are unremarkable.                                                                      Impression:  1. No epileptogenic focus identified.  2. Minimal nonspecific white matter T2 hyperintensities.     I have personally reviewed the examination and initial interpretation  and I agree with the findings.  DAVIDE AMIN MD    CBC RESULTS:   Recent Labs   Lab Test 09/26/19  1147   WBC 7.9   RBC 4.31   HGB 11.6*   HCT 38.6   MCV 90   MCH 26.9   MCHC 30.1*   RDW 14.1          Recent Labs   Lab Test 09/26/19  1147  03/21/19  1940      < >  --    POTASSIUM 3.6   < >  --    CHLORIDE 105   < >  --    SHANKAR 9.1   < >  --    CO2 23   < >  --    BUN 6*   < > 7   CR 0.72   < > 0.68   *   < >  --    TSH  --   --  0.53    < > = values in this interval not displayed.       IMPRESSIONS:  Samara Oropeza is a 24 year old female with PMH significant for chronic migraine headaches, waking her up at night, with evidence of venous congestion and thoracic outlet compression symptomatology.     Recommendations:  1. Lasix 20 BID  2. Chest CT with contrast  3. PT when fluid lower and can be better  tolerated  4. F/U after imaging    60-minutes were spent in evaluation, examination, and counseling.    Again, thank you for allowing me to participate in the care of your patient.      Sincerely,    Darrius JAUREGUI Cha, MD

## 2019-11-06 NOTE — NURSING NOTE
Chief Complaint   Patient presents with     Headache     [facility] NEW - CHRONIC MIGRAINE WITHOUT AURA, INTRACTABLE, WITHOUT STATUS MIGRAINOSUS       Stevie Durán, EMT

## 2019-11-07 ENCOUNTER — OFFICE VISIT (OUTPATIENT)
Dept: PODIATRY | Facility: CLINIC | Age: 24
End: 2019-11-07
Payer: COMMERCIAL

## 2019-11-07 VITALS — SYSTOLIC BLOOD PRESSURE: 102 MMHG | HEIGHT: 63 IN | DIASTOLIC BLOOD PRESSURE: 62 MMHG | BODY MASS INDEX: 27.81 KG/M2

## 2019-11-07 DIAGNOSIS — Z98.890 S/P ANKLE LIGAMENT REPAIR: Primary | ICD-10-CM

## 2019-11-07 PROCEDURE — 99024 POSTOP FOLLOW-UP VISIT: CPT | Performed by: PODIATRIST

## 2019-11-07 NOTE — PATIENT INSTRUCTIONS
Thank you for choosing [facility]!    DR. CRISOSTOMO'S CLINIC LOCATIONS:   MONDAY - [place] TUESDAY - [place]   [address] [facility]  [address]   [place] [address] [place] [address]   [phone]       THURSDAY AM - [place] THURSDAY PM - [place]   [address]   [place] [address] [place] [address]   [phone]       FRIDAY AM - [place] SET UP SURGERY: [phone] [address] APPOINTMENTS: [phone]   [place] [address] BILLING QUESTIONS: [phone] FAX NUMBER: [phone]       Follow Up:  4 weeks      FYI: The following information is included in the after visit summary for all patients:  Body weight can be a sensitive issue to discuss in clinic, but we think the following information is very important. Although we focus on the feet and ankles, we do support the overall health of our patients. Many things can cause foot and ankle problems. Foot structure, activity level, foot mechanics and injuries are common causes of pain. One very important issue that often goes unmentioned, is body weight. Extra weight can cause increased stress on muscles, ligaments, bones and tendons. Sometimes just a few extra pounds is all it takes to put one over her/his threshold. Without reducing that stress, it can be difficult to alleviate pain. As Foot & Ankle specialists, our job is addressing the lower extremity problem and possible causes. Regarding extra body weight, we encourage patients to discuss diet and weight management plans with their primary care doctors. It is this team approach that gives you the best opportunity for pain relief and getting you back on your feet. [facility] has a Comprehensive Weight Management Program. This program includes counseling, education, non-surgical and surgical approaches to weight loss. If you are interested in learning more either talk to you  primary care provider or call [phone].

## 2019-11-07 NOTE — PROGRESS NOTES
"Foot & Ankle Surgery  November 7, 2019    S:  Patient in today approx 6 weeks sp ankle scope and revision ATFL.  Pain levels moderate.  She was concerned about infection because there was mild drainage and some redness starting Friday/Saturday.  She was seen in the Urgency Room and put on Clindamycin.  The ankle is doing better now.  She has not been able to start PT because of an outstanding balance, but they're hoping to get that addressed and start PT soon.      /62   Ht 1.6 m (5' 3\")   BMI 27.81 kg/m        ROS - positive for CC.  Patient denies current nausea, vomiting, chills, fevers, belly pain, calf pain, chest pain or SOB.  Complete remainder of ROS is otherwise neg.    PE - she has multiple spitting sutures/sterile abscesses along the inferior 1/3 of the incision.  No erythema, no other drainage.  Anterior drawer doesn't elicit the migration that was noted at the 2nd post-op visit after she had a fall.  Skin shows no trophic, color or temperature changes otherwise.  No calf redness, swelling or pain noted otherwise.      A/P - 24 year old yo patient approx 6 weeks sp above procedure  -spitting sutures removed today. No other wounds or drainage or SOI, but finish PO abx course.  Island bandage applied today  -tensogrip dispensed.  Ankle swells with increased time up and about.  Advised continuing with compression and RICE/tylenol prn  -start PT for functional rehab  -WBAT in boot. She's using the [employer] today.      Follow up  -  4 weeks or sooner with acute issues      Body mass index is 27.81 kg/m .  Weight management plan: Patient was referred to their PCP to discuss a diet and exercise plan.      Andres Johnson DPM FACFAS FACFAOM  Podiatric Foot & Ankle Surgeon  [facility]  [phone]    "

## 2019-11-11 DIAGNOSIS — Z91.041 CONTRAST MEDIA ALLERGY: Primary | ICD-10-CM

## 2019-11-11 RX ORDER — METHYLPREDNISOLONE 32 MG/1
32 TABLET ORAL
Qty: 2 TABLET | Refills: 0 | Status: SHIPPED | OUTPATIENT
Start: 2019-11-11 | End: 2019-12-26

## 2019-11-12 ENCOUNTER — OFFICE VISIT (OUTPATIENT)
Dept: PSYCHOLOGY | Facility: CLINIC | Age: 24
End: 2019-11-12
Payer: COMMERCIAL

## 2019-11-12 DIAGNOSIS — F32.A DEPRESSION: ICD-10-CM

## 2019-11-12 DIAGNOSIS — F41.1 GAD (GENERALIZED ANXIETY DISORDER): Primary | ICD-10-CM

## 2019-11-12 PROCEDURE — 90834 PSYTX W PT 45 MINUTES: CPT | Performed by: COUNSELOR

## 2019-11-12 ASSESSMENT — ANXIETY QUESTIONNAIRES
1. FEELING NERVOUS, ANXIOUS, OR ON EDGE: SEVERAL DAYS
7. FEELING AFRAID AS IF SOMETHING AWFUL MIGHT HAPPEN: NOT AT ALL
3. WORRYING TOO MUCH ABOUT DIFFERENT THINGS: SEVERAL DAYS
GAD7 TOTAL SCORE: 7
IF YOU CHECKED OFF ANY PROBLEMS ON THIS QUESTIONNAIRE, HOW DIFFICULT HAVE THESE PROBLEMS MADE IT FOR YOU TO DO YOUR WORK, TAKE CARE OF THINGS AT HOME, OR GET ALONG WITH OTHER PEOPLE: SOMEWHAT DIFFICULT
2. NOT BEING ABLE TO STOP OR CONTROL WORRYING: SEVERAL DAYS
6. BECOMING EASILY ANNOYED OR IRRITABLE: SEVERAL DAYS
5. BEING SO RESTLESS THAT IT IS HARD TO SIT STILL: SEVERAL DAYS

## 2019-11-12 ASSESSMENT — PATIENT HEALTH QUESTIONNAIRE - PHQ9
SUM OF ALL RESPONSES TO PHQ QUESTIONS 1-9: 6
5. POOR APPETITE OR OVEREATING: MORE THAN HALF THE DAYS
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
## 2019-11-13 ASSESSMENT — ANXIETY QUESTIONNAIRES: GAD7 TOTAL SCORE: 7

## 2019-11-19 ENCOUNTER — ALLIED HEALTH/NURSE VISIT (OUTPATIENT)
Dept: NEUROLOGY | Facility: CLINIC | Age: 24
End: 2019-11-19
Attending: PSYCHIATRY & NEUROLOGY
Payer: COMMERCIAL

## 2019-11-19 ENCOUNTER — HOSPITAL ENCOUNTER (OUTPATIENT)
Dept: MRI IMAGING | Facility: CLINIC | Age: 24
Discharge: HOME | End: 2019-11-19
Attending: PSYCHIATRY & NEUROLOGY | Admitting: PSYCHIATRY & NEUROLOGY
Payer: COMMERCIAL

## 2019-11-19 ENCOUNTER — TELEPHONE (OUTPATIENT)
Dept: NEUROLOGY | Facility: CLINIC | Age: 24
End: 2019-11-19

## 2019-11-19 DIAGNOSIS — R41.89 SPELL OF ALTERED COGNITION: ICD-10-CM

## 2019-11-19 PROCEDURE — 70551 MRI BRAIN STEM W/O DYE: CPT

## 2019-11-19 NOTE — TELEPHONE ENCOUNTER
I left pt a voicemail updating her about steroid prescription per Dr. Bradshaw. I let her know Dr. Bradshaw had sent a prescription for medrol to her pharmacy to help her wth the contrast for CT chest she will have done tomorrow. I informed her she will need to take two doses of steroids, one 12 hours before the scan and one 2 hours before the scan.      Belinda Kong RN

## 2019-11-20 ENCOUNTER — ANCILLARY PROCEDURE (OUTPATIENT)
Dept: CT IMAGING | Facility: CLINIC | Age: 24
End: 2019-11-20
Attending: PSYCHIATRY & NEUROLOGY
Payer: COMMERCIAL

## 2019-11-20 DIAGNOSIS — R60.1 GENERALIZED EDEMA: ICD-10-CM

## 2019-11-20 RX ORDER — IOPAMIDOL 755 MG/ML
77 INJECTION, SOLUTION INTRAVASCULAR ONCE
Status: COMPLETED | OUTPATIENT
Start: 2019-11-20 | End: 2019-11-20

## 2019-11-20 RX ORDER — IOPAMIDOL 755 MG/ML
96 INJECTION, SOLUTION INTRAVASCULAR ONCE
Status: DISCONTINUED | OUTPATIENT
Start: 2019-11-20 | End: 2019-11-20

## 2019-11-20 RX ADMIN — IOPAMIDOL 77 ML: 755 INJECTION, SOLUTION INTRAVASCULAR at 10:25

## 2019-11-20 NOTE — PROCEDURES
Procedure Date: 2019      EEG #:  .      TYPE OF STUDY:  Routine outpatient EEG.      HISTORY:  Routine outpatient EEG performed on Samara Bah, a 24-year-old being evaluated for spells of altered mental status with amnesia and collapse.  She also suffers from migraine headaches.  She is being treated with gabapentin.      FINDINGS:  During quiet wakefulness, desynchronized low amplitude background.  Mu is seen individually over the 2 central regions with an amplitude of 20 microvolts or so in bipolar montages.  Photic stimulation is performed and does not induce any abnormalities.  Hyperventilation is performed and is associated with occasional brief runs of a 10 Hz posterior dominant rhythm that appears symmetrical.  Posterior dominant rhythm never lasts long enough to assess reactivity.  As the study progresses, N1 sleep appears with slow eye movements.  There are some periods of N2 sleep with vertex waves, typically in the range of 30 microvolts in amplitude, sometimes serial vertex waves.  Sleep spindles are seen briefly.      OTHER INTERICTAL ABNORMALITIES:  No epileptiform discharges.      ICTAL ABNORMALITIES:  No electrographic seizures.      IMPRESSION:  Normal in wakefulness and sleep.  No epileptiform activity or seizures.         MATTHEW MORA MD             D: 2019   T: 2019   MT: amee      Name:     SAMARA BAH   MRN:      [id number]        Account:        [id number]   :      1995           Procedure Date: 2019      Document: [id number]

## 2019-11-25 DIAGNOSIS — G54.0 THORACIC OUTLET SYNDROME OF LEFT THORACIC OUTLET: Primary | ICD-10-CM

## 2019-11-26 DIAGNOSIS — F41.1 GAD (GENERALIZED ANXIETY DISORDER): ICD-10-CM

## 2019-11-26 NOTE — TELEPHONE ENCOUNTER
Last Written Prescription Date:  11/4/19  Last Fill Quantity: 90,   # refills: 1  Last Office Visit: 9/17/19 with    Sonja Abreu APRN CNP   Future Office visit:    Next 5 appointments (look out 90 days)    Dec 10, 2019 11:00 AM CST  [facility] ([address] [address]  [phone]           Routing refill request to provider for review/approval because:  Drug not on the [facility], [facility] or  Health refill protocol or controlled substance     CHECKED  LAST FILLED 11/4/19, 90 capsules, 0/1 refills, by ERNESTO Abreu CNP.

## 2019-11-26 NOTE — TELEPHONE ENCOUNTER
Controlled Substance Refill Request for GABAPENTIN 100 MG CAPSULE  Problem List Complete:    No     PROVIDER TO CONSIDER COMPLETION OF PROBLEM LIST AND OVERVIEW/CONTROLLED SUBSTANCE AGREEMENT    Last Written Prescription Date:  11/04/2019  Last Fill Quantity: 90,   # refills: 1    THE MOST RECENT OFFICE VISIT MUST BE WITHIN THE PAST 3 MONTHS. AT LEAST ONE FACE TO FACE VISIT MUST OCCUR EVERY 6 MONTHS. ADDITIONAL VISITS CAN BE VIRTUAL.  (THIS STATEMENT SHOULD BE DELETED.)    Last Office Visit with [facility] primary care provider: 09/17/2019    Future Office visit:   Next 5 appointments (look out 90 days)    Dec 10, 2019 11:00 AM CST  Return Visit with Ernestina Patel [facility]  [address] [facility]  [facility] [address] [phone]          Controlled substance agreement:   Encounter-Level CSA:    There are no encounter-level csa.     Patient-Level CSA:    There are no patient-level csa.         Last Urine Drug Screen: No results found for: CDAUT, No results found for: COMDAT,   Cannabinoids (18-nrb-4-carboxy-9-THC)   Date Value Ref Range Status   05/01/2018 Detected, Abnormal Result (A) NDET^Not Detected ng/mL Final     Comment:     Cutoff for a positive cannabinoid is greater than 50 ng/ml.  This is an unconfirmed screening result to be used for medical purposes only.   Order [id number] for confirmation or individual confirmation tests to [facility].       Phencyclidine (Phencyclidine)   Date Value Ref Range Status   05/01/2018 Not Detected NDET^Not Detected ng/mL Final     Comment:     Cutoff for a negative PCP is 25 ng/mL or less.     Cocaine (Benzoylecgonine)   Date Value Ref Range Status   05/01/2018 Not Detected NDET^Not Detected ng/mL Final     Comment:     Cutoff for a negative cocaine is 150 ng/ml or less.     Methamphetamine (d-Methamphetamine)   Date Value Ref Range Status   05/01/2018 Not Detected NDET^Not Detected ng/mL Final     Comment:      Cutoff for a negative methamphetamine is 500 ng/ml or less.     Opiates (Morphine)   Date Value Ref Range Status   05/01/2018 Not Detected NDET^Not Detected ng/mL Final     Comment:     Cutoff for a negative opiate is 100 ng/ml or less.     Amphetamine (d-Amphetamine)   Date Value Ref Range Status   05/01/2018 Not Detected NDET^Not Detected ng/mL Final     Comment:     Cutoff for a negative amphetamine is 500 ng/mL or less.     Benzodiazepines (Nordiazepam)   Date Value Ref Range Status   05/01/2018 Not Detected NDET^Not Detected ng/mL Final     Comment:     Cutoff for a negative benzodiazepine is 150 ng/ml or less.     Tricyclic Antidepressants (Desipramine)   Date Value Ref Range Status   05/01/2018 Detected, Abnormal Result (A) NDET^Not Detected ng/mL Final     Comment:     Cutoff for a positive tricyclic antidepressant is greater than 300 ng/ml.  This is an unconfirmed screening result to be used for medical purposes only.   Order [id number] for confirmation or individual confirmation tests to [facility].       Methadone (Methadone)   Date Value Ref Range Status   05/01/2018 Not Detected NDET^Not Detected ng/mL Final     Comment:     Cutoff for a negative methadone is 200 ng/ml or less.     Barbiturates (Butalbital)   Date Value Ref Range Status   05/01/2018 Not Detected NDET^Not Detected ng/mL Final     Comment:     Cutoff for a negative barbituate is 200 ng/ml or less.     Oxycodone (Oxycodone)   Date Value Ref Range Status   05/01/2018 Not Detected NDET^Not Detected ng/mL Final     Comment:     Cutoff for a negative Oxycodone is 100 ng/mL or less.     Propoxyphene (Norpropoxyphene)   Date Value Ref Range Status   05/01/2018 Not Detected NDET^Not Detected ng/mL Final     Comment:     Cutoff for a negative propoxyphene is 300 ng/ml or less     Buprenorphine (Buprenorphine)   Date Value Ref Range Status   05/01/2018 Not Detected NDET^Not Detected ng/mL Final     Comment:     Cutoff for a negative buprenorphine is  10 ng/ml or less        Processing:  Fax Rx to [facility]     [email or web address]       checked in past 3 months?  No, route to RN

## 2019-11-27 RX ORDER — GABAPENTIN 100 MG/1
CAPSULE ORAL
Qty: 90 CAPSULE | Refills: 1 | Status: SHIPPED | OUTPATIENT
Start: 2019-11-27 | End: 2019-12-31

## 2019-12-05 ENCOUNTER — OFFICE VISIT (OUTPATIENT)
Dept: FAMILY MEDICINE | Facility: CLINIC | Age: 24
End: 2019-12-05
Payer: COMMERCIAL

## 2019-12-05 VITALS
HEART RATE: 102 BPM | DIASTOLIC BLOOD PRESSURE: 74 MMHG | TEMPERATURE: 98.6 F | RESPIRATION RATE: 12 BRPM | SYSTOLIC BLOOD PRESSURE: 104 MMHG | OXYGEN SATURATION: 99 %

## 2019-12-05 DIAGNOSIS — I82.90 THROMBUS: ICD-10-CM

## 2019-12-05 DIAGNOSIS — F41.1 GAD (GENERALIZED ANXIETY DISORDER): ICD-10-CM

## 2019-12-05 DIAGNOSIS — I82.612 ACUTE THROMBOSIS OF LEFT BASILIC VEIN: Primary | ICD-10-CM

## 2019-12-05 DIAGNOSIS — R56.9 CONVULSIONS, UNSPECIFIED CONVULSION TYPE (H): ICD-10-CM

## 2019-12-05 PROCEDURE — 99214 OFFICE O/P EST MOD 30 MIN: CPT | Performed by: NURSE PRACTITIONER

## 2019-12-05 RX ORDER — LORAZEPAM 0.5 MG/1
0.5 TABLET ORAL EVERY 6 HOURS PRN
Qty: 10 TABLET | Refills: 0 | Status: SHIPPED | OUTPATIENT
Start: 2019-12-05 | End: 2020-05-21

## 2019-12-05 NOTE — LETTER
December 5, 2019      Samara Oropeza  [address]    [place] [address]        To Whom It May Concern:    Samara Oropeza  was seen on 12/5/2019.  Due to her chronic health issues, She needs to be limited to 6-8 hour shifts, and take breaks to sit at least once every 60 minutes for up to 5 minutes. It would be beneficial for her to have a stool at work as well due to need to limit time on her feet. Her chronic foot infection is worsened by over exertion and swelling. Please contact me with questions or concerns, or to update her restrictions as needed. I will see her regularly to reassess her workability.       Sincerely,        EVI Cardona CNP

## 2019-12-05 NOTE — PROGRESS NOTES
Subjective     Samara Oropeza is a 24 year old female who presents to clinic today for the following health issues:    HPI   ED/UC Followup:    Facility:  [facility]  Date of visit: 12/01/19  Reason for visit: Blood Clots  Current Status: 2 blood clots in left arm due to CT scan       ED Provider ordered a follow-up scan to monitor thrombus in left arm. She has noticed that the pain seems to be spreading closer to her axilla. 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 Otherwise feeling stable; no fevers or chills, shortness of breath, chest pain.  Foot/ankle symptoms are stable- needs a note to take frequent breaks at work.     Patient Active Problem List   Diagnosis     Tics - Tourette syndrome     IBS (irritable bowel syndrome)     Allergic rhinitis     Generalized anxiety disorder     Knee pain     Concussion     Milk protein intolerance     Headaches due to old head injury     Behcet's disease (H)     Migraine     Spell of shaking     On colchicine therapy     Palpitations     Fibromyalgia     Rheumatoid arthritis of multiple sites without rheumatoid factor (H)     Raynaud's disease without gangrene     Chronic abdominal pain     Patellofemoral instability     PTSD (post-traumatic stress disorder)     Cervical dystonia     Acute left ankle pain     Cervical pain     Major depressive disorder, recurrent episode, moderate (H)     Chronic pain syndrome     Convulsions, unspecified convulsion type (H)     Transient alteration of awareness     Vitamin D deficiency     Mobile cecum     Spells of decreased attentiveness     Pelvic floor weakness     Somatic symptom disorder     Gastroparesis     GERD (gastroesophageal reflux disease)     Displacement of lumbar intervertebral disc without myelopathy     Intestinal malabsorption     Iron deficiency associated with nonfamilial restless legs syndrome     Enthesopathy of hip region     Tourette's syndrome     Cellulitis      Infection of joint of ankle (H)     Past Surgical History:   Procedure Laterality Date     ARTHROSCOPY ANKLE, OPEN REPAIR LIGAMENT, COMBINED Left 9/25/2019    Procedure: LEFT ANKLE ARTHROSCOPY WITH LIGAMENT REPAIR;  Surgeon: Andres Johnson DPM;  Location: [facility]     ARTHROSCOPY ANKLE, REPAIR LIGAMENT Left 1/2/2019    Procedure: Ankle arthroscopy and sinus tarsi evacuation, ligament repair, left lower extremity;  Surgeon: Andres Johnson DPM;  Location: [facility] OR     ARTHROSCOPY KNEE WITH PATELLAR REALIGNMENT  7/25/2013    Procedure: ARTHROSCOPY KNEE WITH PATELLAR REALIGNMENT;  Left Knee Arthroscopy, Medial Patellofemoral Ligament Reconstruction with Allograft  ;  Surgeon: Jennifer Acevedo MD;  Location: [facility]     COLONOSCOPY  2015     DENTAL SURGERY  1996    Teeth removal     ENDOSCOPY UPPER, COLONOSCOPY, COMBINED  2005     HC ESOPH/GAS REFLUX TEST W NASAL IMPED >1 HR N/A 2/15/2017    Procedure: ESOPHAGEAL IMPEDENCE FUNCTION TEST WITH 24 HOUR PH GREATER THAN 1 HOUR;  Surgeon: Timothy Matta MD;  Location: [facility]     IR PICC PLACEMENT > 5 YRS OF AGE  3/13/2019     IRRIGATION AND DEBRIDEMENT FOOT, COMBINED Left 3/12/2019    Procedure: COMBINED IRRIGATION AND DEBRIDEMENT LEFT ANKLE;  Surgeon: Micha Glover MD;  Location: [facility]     IRRIGATION AND DEBRIDEMENT LOWER EXTREMITY, COMBINED Left 5/7/2019    Procedure: 1.  Excision of wound down to and including deep fascia, less than 20 cm2.  2.  Irrigation and debridement, left ankle.;  Surgeon: Andres Johnson DPM;  Location: [facility] OR     PICC INSERTION Left 05/05/2019    4Fr - 45cm (5cm external), Basilic vein, SVC RA junction       Social History     Tobacco Use     Smoking status: Never Smoker     Smokeless tobacco: Never Used   Substance Use Topics     Alcohol use: Not Currently     Alcohol/week: 1.0 standard drinks     Types: 1 Standard drinks or equivalent per week     Comment: rarely     Family History   Problem Relation Age of  Onset     Depression Mother      Neurologic Disorder Mother         Migraines, take imitrex injection.  Also in maternal grandmother.       Alcohol/Drug Father      Hypertension Father      Depression Father      Osteoarthritis Father      Cardiovascular Maternal Grandmother      Depression Maternal Grandmother      Hypertension Maternal Grandmother      Alzheimer Disease Maternal Grandmother      Cardiovascular Maternal Grandfather      Hypertension Maternal Grandfather      Depression Maternal Grandfather      Alcohol/Drug Maternal Grandfather      Cardiovascular Paternal Grandmother      Hypertension Paternal Grandmother      Cardiovascular Paternal Grandfather      Hypertension Paternal Grandfather      Glaucoma No family hx of      Macular Degeneration No family hx of            -------------------------------------  Reviewed and updated as needed this visit by Provider         Review of Systems   ROS COMP: Constitutional, HEENT, cardiovascular, pulmonary, gi and gu systems are negative, except as otherwise noted.      Objective    /74   Pulse 102   Temp 98.6  F (37  C) (Temporal)   Resp 12   LMP 09/21/2019   SpO2 99%   There is no height or weight on file to calculate BMI.  Physical Exam   GENERAL: healthy, alert and no distress  NECK: no adenopathy, no asymmetry, masses, or scars and thyroid normal to palpation  RESP: lungs clear to auscultation - no rales, rhonchi or wheezes  CV: regular rate and rhythm, normal S1 S2, no S3 or S4, no murmur, click or rub, no peripheral edema and peripheral pulses strong  MS: no gross musculoskeletal defects noted, no edema  SKIN: no suspicious lesions or rashes    Diagnostic Test Results:  Labs reviewed in Epic  No results found. However, due to the size of the patient record, not all encounters were searched. Please check Results Review for a complete set of results.  No results found for any visits on 12/05/19.        Assessment & Plan   Problem List Items  "Addressed This Visit     Convulsions, unspecified convulsion type (H)      Other Visit Diagnoses     Acute thrombosis of left basilic vein    -  Primary    Thrombus        TAHIR (generalized anxiety disorder)        Relevant Medications    LORazepam (ATIVAN) 0.5 MG tablet           Uses Lorazepam about twice per month; would like a refill today  ED provider ordered a follow-up scan, she will follow up with radiology to get this performed.   BMI:   Estimated body mass index is 27.81 kg/m  as calculated from the following:    Height as of 11/7/19: 1.6 m (5' 3\").    Weight as of 10/15/19: 71.2 kg (157 lb).           See Patient Instructions  No follow-ups on file.    EVI Cardona [facility]          "

## 2019-12-06 ENCOUNTER — ANCILLARY PROCEDURE (OUTPATIENT)
Dept: ULTRASOUND IMAGING | Facility: CLINIC | Age: 24
End: 2019-12-06
Attending: PSYCHIATRY & NEUROLOGY
Payer: COMMERCIAL

## 2019-12-06 DIAGNOSIS — G54.0 THORACIC OUTLET SYNDROME OF LEFT THORACIC OUTLET: ICD-10-CM

## 2019-12-09 DIAGNOSIS — K59.04 CHRONIC IDIOPATHIC CONSTIPATION: ICD-10-CM

## 2019-12-09 RX ORDER — LINACLOTIDE 290 UG/1
CAPSULE, GELATIN COATED ORAL
Qty: 90 CAPSULE | Refills: 0 | Status: SHIPPED | OUTPATIENT
Start: 2019-12-09 | End: 2020-03-03

## 2019-12-09 NOTE — TELEPHONE ENCOUNTER
Prescription approved per [facility] Refill Protocol.    Katt Ca RN   [facility]

## 2019-12-09 NOTE — TELEPHONE ENCOUNTER
linaclotide (LINZESS) 290 MCG capsule      Last Written Prescription Date:  09/13/2019  Last Fill Quantity: 90,   # refills: 0  Last Office Visit: 12/05/2019  Future Office visit:    Next 5 appointments (look out 90 days)    Dec 10, 2019 11:00 AM CST  [facility] ([address] [address]  [phone]           Routing refill request to provider for review/approval because:  Drug not on the [facility], [facility] or  Health refill protocol or controlled substance

## 2019-12-10 ENCOUNTER — OFFICE VISIT (OUTPATIENT)
Dept: PSYCHOLOGY | Facility: CLINIC | Age: 24
End: 2019-12-10
Payer: COMMERCIAL

## 2019-12-10 DIAGNOSIS — F41.1 GAD (GENERALIZED ANXIETY DISORDER): Primary | ICD-10-CM

## 2019-12-10 DIAGNOSIS — F32.A DEPRESSION: ICD-10-CM

## 2019-12-10 PROCEDURE — 90834 PSYTX W PT 45 MINUTES: CPT | Performed by: COUNSELOR

## 2019-12-10 ASSESSMENT — ANXIETY QUESTIONNAIRES
GAD7 TOTAL SCORE: 6
3. WORRYING TOO MUCH ABOUT DIFFERENT THINGS: SEVERAL DAYS
IF YOU CHECKED OFF ANY PROBLEMS ON THIS QUESTIONNAIRE, HOW DIFFICULT HAVE THESE PROBLEMS MADE IT FOR YOU TO DO YOUR WORK, TAKE CARE OF THINGS AT HOME, OR GET ALONG WITH OTHER PEOPLE: SOMEWHAT DIFFICULT
2. NOT BEING ABLE TO STOP OR CONTROL WORRYING: SEVERAL DAYS
6. BECOMING EASILY ANNOYED OR IRRITABLE: NOT AT ALL
7. FEELING AFRAID AS IF SOMETHING AWFUL MIGHT HAPPEN: NOT AT ALL
5. BEING SO RESTLESS THAT IT IS HARD TO SIT STILL: SEVERAL DAYS
1. FEELING NERVOUS, ANXIOUS, OR ON EDGE: SEVERAL DAYS
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
## 2019-12-11 ENCOUNTER — APPOINTMENT (OUTPATIENT)
Dept: CT IMAGING | Facility: CLINIC | Age: 24
End: 2019-12-11
Attending: EMERGENCY MEDICINE
Payer: COMMERCIAL

## 2019-12-11 ENCOUNTER — HOSPITAL ENCOUNTER (EMERGENCY)
Facility: CLINIC | Age: 24
Discharge: HOME | End: 2019-12-11
Attending: EMERGENCY MEDICINE | Admitting: EMERGENCY MEDICINE
Payer: COMMERCIAL

## 2019-12-11 VITALS
OXYGEN SATURATION: 100 % | HEART RATE: 93 BPM | TEMPERATURE: 98.4 F | RESPIRATION RATE: 16 BRPM | DIASTOLIC BLOOD PRESSURE: 79 MMHG | SYSTOLIC BLOOD PRESSURE: 118 MMHG

## 2019-12-11 DIAGNOSIS — R07.9 CHEST PAIN, UNSPECIFIED TYPE: ICD-10-CM

## 2019-12-11 LAB
ANION GAP SERPL CALCULATED.3IONS-SCNC: 7 MMOL/L (ref 3–14)
B-HCG FREE SERPL-ACNC: <5 IU/L
BUN SERPL-MCNC: 12 MG/DL (ref 7–30)
CALCIUM SERPL-MCNC: 8.5 MG/DL (ref 8.5–10.1)
CHLORIDE SERPL-SCNC: 105 MMOL/L (ref 94–109)
CO2 SERPL-SCNC: 26 MMOL/L (ref 20–32)
CREAT SERPL-MCNC: 0.63 MG/DL (ref 0.52–1.04)
ERYTHROCYTE [DISTWIDTH] IN BLOOD BY AUTOMATED COUNT: 13.7 % (ref 10–15)
GFR SERPL CREATININE-BSD FRML MDRD: >90 ML/MIN/{1.73_M2}
GLUCOSE SERPL-MCNC: 62 MG/DL (ref 70–99)
HCT VFR BLD AUTO: 34.8 % (ref 35–47)
HGB BLD-MCNC: 10.8 G/DL (ref 11.7–15.7)
MCH RBC QN AUTO: 27.1 PG (ref 26.5–33)
MCHC RBC AUTO-ENTMCNC: 31 G/DL (ref 31.5–36.5)
MCV RBC AUTO: 87 FL (ref 78–100)
NT-PROBNP SERPL-MCNC: 27 PG/ML (ref 0–450)
PLATELET # BLD AUTO: 230 10E9/L (ref 150–450)
POTASSIUM SERPL-SCNC: 3.5 MMOL/L (ref 3.4–5.3)
RBC # BLD AUTO: 3.98 10E12/L (ref 3.8–5.2)
SODIUM SERPL-SCNC: 138 MMOL/L (ref 133–144)
TROPONIN I SERPL-MCNC: <0.015 UG/L (ref 0–0.04)
WBC # BLD AUTO: 4.9 10E9/L (ref 4–11)

## 2019-12-11 PROCEDURE — 96374 THER/PROPH/DIAG INJ IV PUSH: CPT | Mod: 59

## 2019-12-11 PROCEDURE — 96375 TX/PRO/DX INJ NEW DRUG ADDON: CPT

## 2019-12-11 PROCEDURE — 25000125 ZZHC RX 250: Performed by: EMERGENCY MEDICINE

## 2019-12-11 PROCEDURE — 85027 COMPLETE CBC AUTOMATED: CPT | Performed by: EMERGENCY MEDICINE

## 2019-12-11 PROCEDURE — 25000128 H RX IP 250 OP 636: Performed by: EMERGENCY MEDICINE

## 2019-12-11 PROCEDURE — 71275 CT ANGIOGRAPHY CHEST: CPT

## 2019-12-11 PROCEDURE — 80048 BASIC METABOLIC PNL TOTAL CA: CPT | Performed by: EMERGENCY MEDICINE

## 2019-12-11 PROCEDURE — 93005 ELECTROCARDIOGRAM TRACING: CPT

## 2019-12-11 PROCEDURE — 84702 CHORIONIC GONADOTROPIN TEST: CPT

## 2019-12-11 PROCEDURE — 83880 ASSAY OF NATRIURETIC PEPTIDE: CPT | Performed by: EMERGENCY MEDICINE

## 2019-12-11 PROCEDURE — 84484 ASSAY OF TROPONIN QUANT: CPT | Performed by: EMERGENCY MEDICINE

## 2019-12-11 PROCEDURE — 99285 EMERGENCY DEPT VISIT HI MDM: CPT | Mod: 25

## 2019-12-11 RX ORDER — IOPAMIDOL 755 MG/ML
500 INJECTION, SOLUTION INTRAVASCULAR ONCE
Status: COMPLETED | OUTPATIENT
Start: 2019-12-11 | End: 2019-12-11

## 2019-12-11 RX ORDER — DIPHENHYDRAMINE HYDROCHLORIDE 50 MG/ML
25 INJECTION INTRAMUSCULAR; INTRAVENOUS ONCE
Status: COMPLETED | OUTPATIENT
Start: 2019-12-11 | End: 2019-12-11

## 2019-12-11 RX ORDER — METHYLPREDNISOLONE SODIUM SUCCINATE 125 MG/2ML
125 INJECTION, POWDER, LYOPHILIZED, FOR SOLUTION INTRAMUSCULAR; INTRAVENOUS ONCE
Status: COMPLETED | OUTPATIENT
Start: 2019-12-11 | End: 2019-12-11

## 2019-12-11 RX ADMIN — IOPAMIDOL 57 ML: 755 INJECTION, SOLUTION INTRAVENOUS at 18:13

## 2019-12-11 RX ADMIN — METHYLPREDNISOLONE SODIUM SUCCINATE 125 MG: 125 INJECTION, POWDER, FOR SOLUTION INTRAMUSCULAR; INTRAVENOUS at 17:35

## 2019-12-11 RX ADMIN — DIPHENHYDRAMINE HYDROCHLORIDE 25 MG: 50 INJECTION, SOLUTION INTRAMUSCULAR; INTRAVENOUS at 17:57

## 2019-12-11 RX ADMIN — SODIUM CHLORIDE 77 ML: 9 INJECTION, SOLUTION INTRAVENOUS at 18:14

## 2019-12-11 ASSESSMENT — ENCOUNTER SYMPTOMS
CHEST TIGHTNESS: 0
HEADACHES: 0
CHILLS: 0
COUGH: 0
DIZZINESS: 0
LIGHT-HEADEDNESS: 0
FEVER: 0
COLOR CHANGE: 1
PALPITATIONS: 0
BRUISES/BLEEDS EASILY: 1
DIAPHORESIS: 0
SHORTNESS OF BREATH: 1

## 2019-12-11 ASSESSMENT — ANXIETY QUESTIONNAIRES: GAD7 TOTAL SCORE: 6

## 2019-12-11 NOTE — ED TRIAGE NOTES
Pt presents to ED with C/O chest, neck and shoulder pain that started around 10 PM last night. Diagnosed and started on xarelto on Tuesday for left arm DVT. Took pain pills without relief. At [facility], they were unable to get iv access for CT scan, so she was referred to ED.

## 2019-12-11 NOTE — ED PROVIDER NOTES
History     Chief Complaint:    Deep Vein Thrombosis      HPI   Samara Oropeza is a 24 year old female who presents with known left arm DVT taking xarelto.  She has had several surgeries on her left last one in September 2019.  She was diagnosed and treated at the  for the left arm DVT.  Today she returned to the UR with  left scapular back pain(with subtle bruising), radiating to the neck and left lateral chest.  They were unable to establish IV access, so she was told to come to the emergency department for a PE CT.  She denies any fever, chills, chest pain, or shortness of breath at this time.    Allergies:    Allergies   Allergen Reactions     Amoxil [Penicillins] Rash     Dad unsure of reaction.     Bee Venom Anaphylaxis     Bioflavonoids Anaphylaxis     Contrast Dye Rash     Contrast Media Ready-Box [facility], 04/09/2014.; Contrast Media Ready-Box [facility], 04/09/2014.  NOTE: this is a contrast media oral with iodine. Premedicate with methylpred standard for IV contrast, request barium contrast for oral contrast.     Diagnostic X-Ray Materials Hives and Rash     Contrast Media Ready-Box [facility], 04/09/2014.; Contrast Media Ready-Box [facility], 04/09/2014.  NOTE: this is a contrast media oral with iodine. Premedicate with methylpred standard for IV contrast, request barium contrast for oral contrast.     Orange Fruit [Citrus] Anaphylaxis     Pineapple Anaphylaxis, Difficulty breathing and Rash     Reglan [Metoclopramide] Other (See Comments)     IV dose only, in ER, rapid heart rate.     Ace Inhibitors      Difficulty in breathing and GI upset     Amitiza [Lubiprostone] Nausea and Vomiting     Amoxicillin-Pot Clavulanate      Midazolam Unknown     parent states that when pt takes this medication, she wakes up being very violent .     No Clinical Screening - See Comments      Bleech/ chest tightness, itchy throat, swollen tongue, hives     Tizanidine Other (See Comments)     Confusion, back pain, photophobia, abdominal  pain, shaking, anxious       Versed      Coming out of pelvic exam at age of 5, was kicking and screaming when coming out of the versed.     Adhesive Tape Rash     Azithromycin Hives and Rash     Cephalexin Itching and Rash        Medications:      albuterol (PROAIR HFA/PROVENTIL HFA/VENTOLIN HFA) 108 (90 BASE) MCG/ACT Inhaler  amitriptyline (ELAVIL) 25 MG tablet  artificial tears OINT ophthalmic ointment  aspirin (ASPIRIN CHILDRENS) 81 MG chewable tablet  benzocaine (TOPICALE XTRA) 20 % GEL  betamethasone valerate (VALISONE) 0.1 % cream  bisacodyl (DULCOLAX) 5 MG EC tablet  citalopram (CELEXA) 20 MG tablet  clobetasol (TEMOVATE) 0.05 % cream  colchicine (COLCYRS) 0.6 MG tablet  cyproheptadine (PERIACTIN) 4 MG tablet  dicyclomine (BENTYL) 20 MG tablet  diphenhydrAMINE (BENADRYL) 25 MG tablet  EPINEPHrine (EPIPEN 2-EAMON) 0.3 MG/0.3ML injection  furosemide (LASIX) 20 MG tablet  gabapentin (NEURONTIN) 100 MG capsule  guanFACINE (TENEX) 1 MG tablet  hydrocortisone 1 % CREA cream  hydrOXYzine (ATARAX) 25 MG tablet  hydrOXYzine (ATARAX) 25 MG tablet  hyoscyamine (ANASPAZ/LEVSIN) 0.125 MG tablet  hypromellose (GENTEAL) 0.3 % SOLN  lactulose 20 GM/30ML SOLN  lidocaine (LMX4) 4 % external cream  lidocaine (LMX4) 4 % external cream  LINZESS 290 MCG capsule  LORazepam (ATIVAN) 0.5 MG tablet  methylPREDNISolone (MEDROL) 32 MG tablet  omeprazole (PRILOSEC) 40 MG capsule  ondansetron (ZOFRAN-ODT) 8 MG ODT tab  order for DME  order for DME  order for DME  order for DME  order for DME  order for DME  polyethylene glycol (MIRALAX/GLYCOLAX) powder  promethazine (PHENERGAN) 12.5 MG tablet  sodium chloride 0.9% SOLN BOLUS  SPRINTEC 28 0.25-35 MG-MCG tablet  sucralfate (CARAFATE) 1 GM/10ML suspension  triamcinolone (KENALOG) 0.5 % external ointment        Problem List:      Patient Active Problem List    Diagnosis Date Noted     Infection of joint of ankle (H) 05/06/2019     Priority: Medium     Cellulitis 03/09/2019     Priority: Medium      Displacement of lumbar intervertebral disc without myelopathy 11/13/2018     Priority: Medium     Overview:   Created by Conversion       Iron deficiency associated with nonfamilial restless legs syndrome 11/13/2018     Priority: Medium     Enthesopathy of hip region 11/13/2018     Priority: Medium     Overview:   Created by Conversion       Tourette's syndrome 11/13/2018     Priority: Medium     Overview:   Created by Conversion       Somatic symptom disorder 06/01/2018     Priority: Medium     Pelvic floor weakness 04/25/2018     Priority: Medium     Spells of decreased attentiveness 12/19/2017     Priority: Medium     Mobile cecum 11/09/2017     Priority: Medium     Cecum noted in Right lower quadrant on 4/17 CT scan, and in Left upper Quadrant on CT on 11/2017.       Vitamin D deficiency 10/11/2017     Priority: Medium     How low, unknown/not found. On D when tested at 27. Starting cholecalciferol October 2017. Needs recheck.       Convulsions, unspecified convulsion type (H) 10/03/2017     Priority: Medium     Transient alteration of awareness 10/03/2017     Priority: Medium     Chronic pain syndrome 07/27/2017     Priority: Medium     Major depressive disorder, recurrent episode, moderate (H) 06/27/2017     Priority: Medium     Cervical pain 05/02/2017     Priority: Medium     Acute left ankle pain 03/31/2017     Priority: Medium     Cervical dystonia 03/28/2017     Priority: Medium     PTSD (post-traumatic stress disorder) 01/17/2017     Priority: Medium     Patellofemoral instability 10/20/2016     Priority: Medium     Fibromyalgia 08/04/2016     Priority: Medium     Rheumatoid arthritis of multiple sites without rheumatoid factor (H) 08/04/2016     Priority: Medium     Raynaud's disease without gangrene 08/04/2016     Priority: Medium     Chronic abdominal pain 08/04/2016     Priority: Medium     Palpitations 01/12/2016     Priority: Medium     On colchicine therapy 10/30/2015     Priority: Medium      Spell of shaking 05/06/2015     Priority: Medium     Migraine 02/04/2015     Priority: Medium     Behcet's disease (H) 12/10/2014     Priority: Medium     Headaches due to old head injury 11/12/2013     Priority: Medium     Milk protein intolerance 10/11/2013     Priority: Medium     Intestinal malabsorption 10/11/2013     Priority: Medium     Concussion 02/13/2013     Priority: Medium     Jan 2013, with prolonged recovery- followed by sports med         Knee pain 01/03/2013     Priority: Medium     Generalized anxiety disorder 06/25/2009     Priority: Medium     Tics - Tourette syndrome 05/18/2009     Priority: Medium     Followed by psychotherapy. Symptoms well managed. Originally diagnosed at [facility]. (Dr. Simpson)           IBS (irritable bowel syndrome) 05/18/2009     Priority: Medium     Allergic rhinitis 05/18/2009     Priority: Medium     GERD (gastroesophageal reflux disease) 01/10/2008     Priority: Medium     Gastroparesis 1994     Priority: Medium        Past Medical History:      Past Medical History:   Diagnosis Date     Anemia      Anxiety      Arthritis      Behcet's disease (H)      Cervical adenitis May 2010     Chronic abdominal pain      Constipation, chronic 1994     Fibromyalgia      Gastro-oesophageal reflux disease      Gastroparesis      Irregular heart beat      Migraines      Neuromuscular disorder (H)      Palpitations      PONV (postoperative nausea and vomiting)      Seizure (H)      Seizures (H)      Syncope      Tourette's        Past Surgical History:      Past Surgical History:   Procedure Laterality Date     ARTHROSCOPY ANKLE, OPEN REPAIR LIGAMENT, COMBINED Left 9/25/2019    Procedure: LEFT ANKLE ARTHROSCOPY WITH LIGAMENT REPAIR;  Surgeon: Andres Johnson DPM;  Location: [facility]     ARTHROSCOPY ANKLE, REPAIR LIGAMENT Left 1/2/2019    Procedure: Ankle arthroscopy and sinus tarsi evacuation, ligament repair, left lower extremity;  Surgeon: Andres Johnson DPM;   Location: [facility] OR     ARTHROSCOPY KNEE WITH PATELLAR REALIGNMENT  7/25/2013    Procedure: ARTHROSCOPY KNEE WITH PATELLAR REALIGNMENT;  Left Knee Arthroscopy, Medial Patellofemoral Ligament Reconstruction with Allograft  ;  Surgeon: Jennifer Acevedo MD;  Location: [facility]     COLONOSCOPY  2015     DENTAL SURGERY  1996    Teeth removal     ENDOSCOPY UPPER, COLONOSCOPY, COMBINED  2005     HC ESOPH/GAS REFLUX TEST W NASAL IMPED >1 HR N/A 2/15/2017    Procedure: ESOPHAGEAL IMPEDENCE FUNCTION TEST WITH 24 HOUR PH GREATER THAN 1 HOUR;  Surgeon: Timothy Matta MD;  Location: [facility]     IR PICC PLACEMENT > 5 YRS OF AGE  3/13/2019     IRRIGATION AND DEBRIDEMENT FOOT, COMBINED Left 3/12/2019    Procedure: COMBINED IRRIGATION AND DEBRIDEMENT LEFT ANKLE;  Surgeon: Micha Glover MD;  Location: [facility]     IRRIGATION AND DEBRIDEMENT LOWER EXTREMITY, COMBINED Left 5/7/2019    Procedure: 1.  Excision of wound down to and including deep fascia, less than 20 cm2.  2.  Irrigation and debridement, left ankle.;  Surgeon: Andres Johnson DPM;  Location: [facility] OR     PICC INSERTION Left 05/05/2019    4Fr - 45cm (5cm external), Basilic vein, SVC RA junction       Family History:      Family History   Problem Relation Age of Onset     Depression Mother      Neurologic Disorder Mother         Migraines, take imitrex injection.  Also in maternal grandmother.       Alcohol/Drug Father      Hypertension Father      Depression Father      Osteoarthritis Father      Cardiovascular Maternal Grandmother      Depression Maternal Grandmother      Hypertension Maternal Grandmother      Alzheimer Disease Maternal Grandmother      Cardiovascular Maternal Grandfather      Hypertension Maternal Grandfather      Depression Maternal Grandfather      Alcohol/Drug Maternal Grandfather      Cardiovascular Paternal Grandmother      Hypertension Paternal Grandmother      Cardiovascular Paternal Grandfather      Hypertension Paternal  Grandfather      Glaucoma No family hx of      Macular Degeneration No family hx of        Social History:    Marital Status:  Single [1]  Social History     Tobacco Use     Smoking status: Never Smoker     Smokeless tobacco: Never Used   Substance Use Topics     Alcohol use: Not Currently     Alcohol/week: 1.0 standard drinks     Types: 1 Standard drinks or equivalent per week     Comment: rarely     Drug use: Not Currently     Types: Marijuana     Comment: occassional marijuana only, denies others        Review of Systems   Constitutional: Negative for chills, diaphoresis and fever.   Respiratory: Positive for shortness of breath. Negative for cough and chest tightness.    Cardiovascular: Negative for chest pain, palpitations and leg swelling.   Musculoskeletal:        Left arm pain, left scapula pain   Skin: Positive for color change (slight bruising noted to left scapula area, denies injury).   Neurological: Negative for dizziness, light-headedness and headaches.   Hematological: Bruises/bleeds easily (on xarelto).   All other systems reviewed and are negative.        Physical Exam   First Vitals:  BP: 118/80  Pulse: 82  Heart Rate: 82  Temp: 98.4  F (36.9  C)  Resp: 16  SpO2: 99 %      Physical Exam      Emergency Department Course   ECG:       EKG Interpretation:        Normal sinus rhythm  75 BMP  OH interval 144 ms  QRS 72 ms  QT//413 ms  P-R-T axes 33 60 41        Imaging:  CT Chest Pulmonary Embolism w Contrast   Final Result   IMPRESSION:   1.  Diffuse groundglass opacity throughout both hemithoraces with small foci of air trapping likely infectious or inflammatory.   2.  No pulmonary embolus, aortic aneurysm or dissection.          Laboratory:  Labs Ordered and Resulted from Time of ED Arrival Up to the Time of Departure from the ED   CBC WITH PLATELETS - Abnormal; Notable for the following components:       Result Value    Hemoglobin 10.8 (*)     Hematocrit 34.8 (*)     MCHC 31.0 (*)     All  other components within normal limits   BASIC METABOLIC PANEL - Abnormal; Notable for the following components:    Glucose 62 (*)     All other components within normal limits   TROPONIN I   NT PROBNP INPATIENT   CARDIAC CONTINUOUS MONITORING   ISTAT HCG QUANTITATIVE PREGNANCY NURSING POCT   PERIPHERAL IV CATHETER   ISTAT HCG QUANTITATIVE PREGNANCY POCT              Impression & Plan        Medical Decision Making:   Patient presented with left scapular pain and shortness of breath.  Upon reassessment of the patient there is been no change in symptoms.  Differential diagnosis could include PE, pneumonia, or pneumothorax.  PE CT was negative.  Although there were incidental  findings of Diffuse groundglass opacity throughout both hemithoraces with small foci of air trapping likely infectious or inflammatory, and these were discussed with patient. She will follow up with PMD.  Patient was instructed to continue her Xarelto and to return to the emergency department for any increased or new worsening symptoms.    Close follow up with primary care is indicated should the pain continue, as further work up may be performed; this was made clear to Samara, who understands.        Diagnosis:    ICD-10-CM    1. Chest pain, unspecified type R07.9        Disposition:  discharged to home with family    Discharge Medications:  New Prescriptions    No medications on file       Jen Reese NP  12/11/2019   [facility] EMERGENCY DEPARTMENT       Jen Aldana NP  12/11/19 0031

## 2019-12-11 NOTE — ED PROVIDER NOTES
Emergency Department Attending Supervision Note  12/11/2019  5:16 PM      I evaluated this patient in conjunction with Jen Reese NP      Briefly, the patient presented with left-sided chest and back pain.  Patient had left ankle and foot surgery in mid September of 2019.  She had been doing well.  Over the last week she developed left forearm discomfort and was diagnosed with superficial thrombophlebitis at the UR.  She continued to have pain and returned to the UR.  Ultrasound at that time revealed deep venous thrombosis.  She was initiated on Xarelto with her first dose yesterday.  Today she presents because of the development of left scapular back pain radiating to the neck and left lateral chest.  The pain has been constant since it initiated.  There is mild increase in pain with touch and deep inspiration.  She is not currently short of breath.  She denies hemoptysis, history of DVT/PE, tobacco use.  She is currently on estrogen containing birth control pills. She presented to the UR today in which they wanted to perform CT PE study but were unable to obtain adequate IV access thus referred to the ED.      On my exam,     General:   Pleasant, age appropriate.  HEENT:    Oropharynx is moist.  Eyes:    Conjunctiva normal  Neck:    Supple, no meningismus.     CV:     Regular rate and rhythm.      No murmurs, rubs or gallops.        2+ radial pulses bilateral.    PULM:    Clear to auscultation bilateral.       No respiratory distress.      Good air exchange.     No rales or wheezing.     No stridor.  ABD:    Soft, non-tender, non-distended.       No pulsatile masses.       No rebound, guarding or rigidity.  MSK:     LLE in boot     Moderate focal tenderness to trapezius muscle on the left  LYMPH:   No cervical lymphadenopathy.  NEURO:   Alert. Good muscle tone, no atrophy.  Skin:    Warm, dry and intact.    Psych:    Mood is good and affect is appropriate.        EKG reveals no ischemic changes or signs of  right heart strain.  Troponin is within normal limits.  No concern for atypical ACS.  CT scan of the chest was undertaken which fortunately reveals no pulmonary embolus .  Examination is most concerning for likely musculoskeletal source of her shoulder/back pain, speficially muscular strain with spasm.  She was incidentally noted to have groundglass opacities with etiology likely infectious versus inflammatory.  Patient has no infectious symptoms.  She does have a history of inflammatory conditions including rheumatoid arthritis.  The cause of these opacities are uncertain but do not require emergent evaluation.  Patient safe for discharge home.  She will continue her Xarelto for her left upper extremity DVT.  She will follow-up with her primary care physician for further work-up of her groundglass opacities.      Diagnosis    ICD-10-CM    1. Chest pain, unspecified type R07.9          Miguel Steele MD Matthews, Jeremiah R, MD  12/11/19 2130

## 2019-12-11 NOTE — ED AVS SNAPSHOT
[facility] Emergency Department  [address]  [facility] [address]  Phone:  [phone]  Fax:  [phone]                                    Samara Oropeza   MRN: [id number]    Department:  [facility] Emergency Department   Date of Visit:  12/11/2019           After Visit Summary Signature Page    I have received my discharge instructions, and my questions have been answered. I have discussed any challenges I see with this plan with the nurse or doctor.    ..........................................................................................................................................  Patient/Patient Representative Signature      ..........................................................................................................................................  Patient Representative Print Name and Relationship to Patient    ..................................................               ................................................  Date                                   Time    ..........................................................................................................................................  Reviewed by Signature/Title    ...................................................              ..............................................  Date                                               Time          22EPIC Rev 08/18

## 2019-12-12 LAB — INTERPRETATION ECG - MUSE: NORMAL

## 2019-12-12 NOTE — DISCHARGE INSTRUCTIONS
There is no blood clot in your lungs on your CT scan but there are changes called groundglass opacities in the lungs.  This sometimes can be seen with infection for which you are not having symptoms to suggest infection.  This also can occur with an inflammatory condition.  Please follow-up with your primary care doctor as you will need further work-up of this process.    Discharge Instructions  Chest Pain    You have been seen today for chest pain or discomfort.  At this time, your provider has found no signs that your chest pain is due to a serious or life-threatening condition, (or you have declined more testing and/or admission to the hospital). However, sometimes there is a serious problem that does not show up right away. Your evaluation today may not be complete and you may need further testing and evaluation.     Generally, every Emergency Department visit should have a follow-up clinic visit with either a primary or a specialty clinic/provider. Please follow-up as instructed by your emergency provider today.  Return to the Emergency Department if:  Your chest pain changes, gets worse, starts to happen more often, or comes with less activity.  You are newly short of breath.  You get very weak or tired.  You pass out or faint.  You have any new symptoms, like fever, cough, numb legs, or you cough up blood.  You have anything else that worries you.    Until you follow-up with your regular provider, please do the following:  Take one aspirin daily unless you have an allergy or are told not to by your provider.  If a stress test appointment has been made, go to the appointment.  If you have questions, contact your regular provider.  Follow-up with your regular provider/clinic as directed; this is very important.    If you were given a prescription for medicine here today, be sure to read all of the information (including the package insert) that comes with your prescription.  This will include important  information about the medicine, its side effects, and any warnings that you need to know about.  The pharmacist who fills the prescription can provide more information and answer questions you may have about the medicine.  If you have questions or concerns that the pharmacist cannot address, please call or return to the Emergency Department.       Remember that you can always come back to the Emergency Department if you are not able to see your regular provider in the amount of time listed above, if you get any new symptoms, or if there is anything that worries you.

## 2019-12-17 ENCOUNTER — TRANSFERRED RECORDS (OUTPATIENT)
Dept: HEALTH INFORMATION MANAGEMENT | Facility: CLINIC | Age: 24
End: 2019-12-17

## 2019-12-26 ENCOUNTER — OFFICE VISIT (OUTPATIENT)
Dept: PODIATRY | Facility: CLINIC | Age: 24
End: 2019-12-26
Payer: COMMERCIAL

## 2019-12-26 VITALS
HEIGHT: 63 IN | SYSTOLIC BLOOD PRESSURE: 123 MMHG | HEART RATE: 107 BPM | WEIGHT: 158 LBS | OXYGEN SATURATION: 100 % | BODY MASS INDEX: 28 KG/M2 | DIASTOLIC BLOOD PRESSURE: 92 MMHG

## 2019-12-26 DIAGNOSIS — G89.29 CHRONIC PAIN OF LEFT ANKLE: ICD-10-CM

## 2019-12-26 DIAGNOSIS — Z98.890 S/P ANKLE LIGAMENT REPAIR: Primary | ICD-10-CM

## 2019-12-26 DIAGNOSIS — M25.572 CHRONIC PAIN OF LEFT ANKLE: ICD-10-CM

## 2019-12-26 PROCEDURE — 99024 POSTOP FOLLOW-UP VISIT: CPT | Performed by: PODIATRIST

## 2019-12-26 ASSESSMENT — MIFFLIN-ST. JEOR: SCORE: 1430.81

## 2019-12-26 NOTE — PATIENT INSTRUCTIONS
Thank you for choosing [facility] / Foot & Ankle Surgery!    DR. CRISOSTOMO'S CLINIC LOCATIONS:   MONDAY - [place] TUESDAY AM - [place]   [address] [facility]  [address] [place] [address]   [place] [address] [place] [address]   [phone]       THURSDAY AM - [place] THURSDAY PM - [place]   [address]   [place] [address] [place] [address]   [phone]       FRIDAY AM - [place] SET UP SURGERY: [phone] [address] APPOINTMENTS: [phone]   [place] [address] BILLING QUESTIONS: [phone] FAX NUMBER: [phone]     Follow Up:     Samara to follow up with Primary Care provider regarding elevated blood pressure.        FYI: The following information is included in the after visit summary for all patients:  Body weight can be a sensitive issue to discuss in clinic, but we think the following information is very important. Although we focus on the feet and ankles, we do support the overall health of our patients. Many things can cause foot and ankle problems. Foot structure, activity level, foot mechanics and injuries are common causes of pain. One very important issue that often goes unmentioned, is body weight. Extra weight can cause increased stress on muscles, ligaments, bones and tendons. Sometimes just a few extra pounds is all it takes to put one over her/his threshold. Without reducing that stress, it can be difficult to alleviate pain. As Foot & Ankle specialists, our job is addressing the lower extremity problem and possible causes. Regarding extra body weight, we encourage patients to discuss diet and weight management plans with their primary care doctors. It is this team approach that gives you the best opportunity for pain relief and getting you back on your feet. [place] has a Comprehensive Weight Management Program. This program includes counseling, education, non-surgical and  surgical approaches to weight loss. If you are interested in learning more either talk to you primary care provider or call [phone].

## 2019-12-26 NOTE — PROGRESS NOTES
"Foot & Ankle Surgery  December 26, 2019    S:  Patient in today approx 12 weeks sp revision ankle scope and revision open ATFL repair with the Internal Brace.  Pain levels elevated recently without new injury.  She states it now feels deep inside the ankle, pointing to the anterior gutter.  She has not paid off her PT debt yet, and thus has not started PT for functional rehab.  Using the boot and crutches today.  Icing at home.  Recent DVT in the upper extremity, on Xarelto    BP (!) 123/92   Pulse 107   Ht 1.6 m (5' 3\")   Wt 71.7 kg (158 lb)   SpO2 100%   BMI 27.99 kg/m        ROS - positive for CC.  Patient denies current nausea, vomiting, chills, fevers, belly pain, calf pain, chest pain or SOB.  Complete remainder of ROS is otherwise neg.    PE - incisions healed.  Anterior drawer shows minimal pain, and no appreciable migration.  Tender at anterior gutter without inflammation.  Skin shows no trophic, color or temperature changes otherwise.  No calf redness, swelling or pain noted otherwise.    A/P - 24 year old yo patient approx 12 weeks sp above procedure  -no new injury, so no concerns about stability issues 2/2 to ligamentous pathology  -aggressive RICE/tylenol prn based on pain  -advised starting/completing PT as soon as she's able  -discussed intra-articular injection as next option.  She will do above plan and follow up after PT for a recheck, but call for recheck prior to this if ankle pain fails to progress  -WBAT in boot, ok to phase to ankle brace/shoe to tolerance    Follow up  -  After PT or sooner with acute issues      Body mass index is 27.99 kg/m .  Weight management plan: Patient was referred to their PCP to discuss a diet and exercise plan.      Andres Johnson, ROSIBEL FACFAS FACFAOM  Podiatric Foot & Ankle Surgeon  [facility]  [phone]      "

## 2019-12-31 ENCOUNTER — MYC MEDICAL ADVICE (OUTPATIENT)
Dept: FAMILY MEDICINE | Facility: CLINIC | Age: 24
End: 2019-12-31

## 2019-12-31 ENCOUNTER — OFFICE VISIT (OUTPATIENT)
Dept: FAMILY MEDICINE | Facility: CLINIC | Age: 24
End: 2019-12-31
Payer: COMMERCIAL

## 2019-12-31 VITALS
HEART RATE: 110 BPM | OXYGEN SATURATION: 99 % | TEMPERATURE: 98.4 F | SYSTOLIC BLOOD PRESSURE: 114 MMHG | DIASTOLIC BLOOD PRESSURE: 80 MMHG

## 2019-12-31 DIAGNOSIS — M35.2 BEHCET'S DISEASE (H): ICD-10-CM

## 2019-12-31 DIAGNOSIS — F41.1 GAD (GENERALIZED ANXIETY DISORDER): ICD-10-CM

## 2019-12-31 DIAGNOSIS — L29.9 PRURITUS: ICD-10-CM

## 2019-12-31 DIAGNOSIS — M79.7 FIBROMYALGIA: ICD-10-CM

## 2019-12-31 DIAGNOSIS — N94.6 DYSMENORRHEA: ICD-10-CM

## 2019-12-31 DIAGNOSIS — I82.622 ACUTE DEEP VEIN THROMBOSIS (DVT) OF LEFT UPPER EXTREMITY, UNSPECIFIED VEIN (H): Primary | ICD-10-CM

## 2019-12-31 DIAGNOSIS — F95.9 TIC: ICD-10-CM

## 2019-12-31 LAB
CRP SERPL-MCNC: 3.5 MG/L (ref 0–8)
ERYTHROCYTE [DISTWIDTH] IN BLOOD BY AUTOMATED COUNT: 14.8 % (ref 10–15)
ERYTHROCYTE [SEDIMENTATION RATE] IN BLOOD BY WESTERGREN METHOD: 8 MM/H (ref 0–20)
HCT VFR BLD AUTO: 37.1 % (ref 35–47)
HGB BLD-MCNC: 11.9 G/DL (ref 11.7–15.7)
MCH RBC QN AUTO: 27.4 PG (ref 26.5–33)
MCHC RBC AUTO-ENTMCNC: 32.1 G/DL (ref 31.5–36.5)
MCV RBC AUTO: 85 FL (ref 78–100)
PLATELET # BLD AUTO: 271 10E9/L (ref 150–450)
RBC # BLD AUTO: 4.35 10E12/L (ref 3.8–5.2)
WBC # BLD AUTO: 4.9 10E9/L (ref 4–11)

## 2019-12-31 PROCEDURE — 36415 COLL VENOUS BLD VENIPUNCTURE: CPT | Performed by: NURSE PRACTITIONER

## 2019-12-31 PROCEDURE — 99214 OFFICE O/P EST MOD 30 MIN: CPT | Performed by: NURSE PRACTITIONER

## 2019-12-31 PROCEDURE — 85652 RBC SED RATE AUTOMATED: CPT | Performed by: NURSE PRACTITIONER

## 2019-12-31 PROCEDURE — 82550 ASSAY OF CK (CPK): CPT | Performed by: NURSE PRACTITIONER

## 2019-12-31 PROCEDURE — 85027 COMPLETE CBC AUTOMATED: CPT | Performed by: NURSE PRACTITIONER

## 2019-12-31 PROCEDURE — 86140 C-REACTIVE PROTEIN: CPT | Performed by: NURSE PRACTITIONER

## 2019-12-31 PROCEDURE — 84520 ASSAY OF UREA NITROGEN: CPT | Performed by: NURSE PRACTITIONER

## 2019-12-31 RX ORDER — GABAPENTIN 300 MG/1
300 CAPSULE ORAL 3 TIMES DAILY
Qty: 90 CAPSULE | Refills: 1 | Status: SHIPPED | OUTPATIENT
Start: 2019-12-31 | End: 2020-01-23

## 2019-12-31 RX ORDER — GUANFACINE 1 MG/1
TABLET ORAL
Qty: 180 TABLET | Refills: 5 | Status: SHIPPED | OUTPATIENT
Start: 2019-12-31 | End: 2020-03-31

## 2019-12-31 NOTE — PATIENT INSTRUCTIONS
Radiology will call you to schedule left upper extremity ultrasound  -schedule pelvic ultrasound and follow up with OBGYN at   -please call me/send message on Thursday if you are not starting to feel better from cough/sinus stuff  -I will let you know about results of imaging and blood work  -follow up in a few weeks with me

## 2019-12-31 NOTE — PROGRESS NOTES
Subjective     Samara Oropeza is a 24 year old female who presents to clinic today for the following health issues:    HPI   ED/UC Followup:    Facility:  [facility]  Date of visit: 12/11/19  Reason for visit: Chest Pain- DVT   Current Status: feeling better, still having some chest pain     -Mood stable. Has not been sleeping as well, just 4 hours at night; Sleep has been worse since her Elavil was decreased from 50 to 25 mg. She originally felt sleepy in the morning on the 50 at night, but it had not been bothering her in the morning lately.  --Ongoing vaginal bleeding since September. Has been taking the sprintec daily; was instructed to find a new option for BC at the hospital. Thinks bleeding was worse before she started the sprintec. No history of fibroids or ovarian cysts that she knows of.  --Needs refill of Tenex for Tourettes.  Rheumatology would like to recheck her inflammatory markers.  -She has been taking Gabapentin 300 mg once daily in the morning- it doesn't make her tired at all.   She is really eager to return to work, but can't go back until her clot is cleare      -------------------------------------    Patient Active Problem List   Diagnosis     Tics - Tourette syndrome     IBS (irritable bowel syndrome)     Allergic rhinitis     Generalized anxiety disorder     Knee pain     Concussion     Milk protein intolerance     Headaches due to old head injury     Behcet's disease (H)     Migraine     Spell of shaking     On colchicine therapy     Palpitations     Fibromyalgia     Rheumatoid arthritis of multiple sites without rheumatoid factor (H)     Raynaud's disease without gangrene     Chronic abdominal pain     Patellofemoral instability     PTSD (post-traumatic stress disorder)     Cervical dystonia     Acute left ankle pain     Cervical pain     Major depressive disorder, recurrent episode, moderate (H)     Chronic pain syndrome     Convulsions, unspecified convulsion type (H)      Transient alteration of awareness     Vitamin D deficiency     Mobile cecum     Spells of decreased attentiveness     Pelvic floor weakness     Somatic symptom disorder     Gastroparesis     GERD (gastroesophageal reflux disease)     Displacement of lumbar intervertebral disc without myelopathy     Intestinal malabsorption     Iron deficiency associated with nonfamilial restless legs syndrome     Enthesopathy of hip region     Tourette's syndrome     Cellulitis     Infection of joint of ankle (H)     Past Surgical History:   Procedure Laterality Date     ARTHROSCOPY ANKLE, OPEN REPAIR LIGAMENT, COMBINED Left 9/25/2019    Procedure: LEFT ANKLE ARTHROSCOPY WITH LIGAMENT REPAIR;  Surgeon: Andres Johnson DPM;  Location:  OR     ARTHROSCOPY ANKLE, REPAIR LIGAMENT Left 1/2/2019    Procedure: Ankle arthroscopy and sinus tarsi evacuation, ligament repair, left lower extremity;  Surgeon: Andres Johnson DPM;  Location:  OR     ARTHROSCOPY KNEE WITH PATELLAR REALIGNMENT  7/25/2013    Procedure: ARTHROSCOPY KNEE WITH PATELLAR REALIGNMENT;  Left Knee Arthroscopy, Medial Patellofemoral Ligament Reconstruction with Allograft  ;  Surgeon: Jennifer Acevedo MD;  Location: [facility]     COLONOSCOPY  2015     DENTAL SURGERY  1996    Teeth removal     ENDOSCOPY UPPER, COLONOSCOPY, COMBINED  2005     HC ESOPH/GAS REFLUX TEST W NASAL IMPED >1 HR N/A 2/15/2017    Procedure: ESOPHAGEAL IMPEDENCE FUNCTION TEST WITH 24 HOUR PH GREATER THAN 1 HOUR;  Surgeon: Timothy Matta MD;  Location: [facility]     IR PICC PLACEMENT > 5 YRS OF AGE  3/13/2019     IRRIGATION AND DEBRIDEMENT FOOT, COMBINED Left 3/12/2019    Procedure: COMBINED IRRIGATION AND DEBRIDEMENT LEFT ANKLE;  Surgeon: Micha Glover MD;  Location: [facility] OR     IRRIGATION AND DEBRIDEMENT LOWER EXTREMITY, COMBINED Left 5/7/2019    Procedure: 1.  Excision of wound down to and including deep fascia, less than 20 cm2.  2.  Irrigation and debridement, left  ankle.;  Surgeon: Andres Johnson DPM;  Location: [facility] OR     PICC INSERTION Left 05/05/2019    4Fr - 45cm (5cm external), Basilic vein, SVC RA junction       Social History     Tobacco Use     Smoking status: Never Smoker     Smokeless tobacco: Never Used   Substance Use Topics     Alcohol use: Not Currently     Alcohol/week: 1.0 standard drinks     Types: 1 Standard drinks or equivalent per week     Comment: rarely     Family History   Problem Relation Age of Onset     Depression Mother      Neurologic Disorder Mother         Migraines, take imitrex injection.  Also in maternal grandmother.       Alcohol/Drug Father      Hypertension Father      Depression Father      Osteoarthritis Father      Cardiovascular Maternal Grandmother      Depression Maternal Grandmother      Hypertension Maternal Grandmother      Alzheimer Disease Maternal Grandmother      Cardiovascular Maternal Grandfather      Hypertension Maternal Grandfather      Depression Maternal Grandfather      Alcohol/Drug Maternal Grandfather      Diabetes Maternal Grandfather      Cardiovascular Paternal Grandmother      Hypertension Paternal Grandmother      Cardiovascular Paternal Grandfather      Hypertension Paternal Grandfather      Glaucoma No family hx of      Macular Degeneration No family hx of            -------------------------------------  Reviewed and updated as needed this visit by Provider         Review of Systems   ROS COMP: Constitutional, HEENT, cardiovascular, pulmonary, GI, , musculoskeletal, neuro, skin, endocrine and psych systems are negative, except as otherwise noted.      Objective    /80 (BP Location: Right arm, Patient Position: Sitting, Cuff Size: Adult Regular)   Pulse 110   Temp 98.4  F (36.9  C) (Temporal)   SpO2 99%   There is no height or weight on file to calculate BMI.  Physical Exam   GENERAL: healthy, alert and no distress  HENT: ear canals and TM's normal, nose and mouth without ulcers or  lesions  NECK: no adenopathy, no asymmetry, masses, or scars and thyroid normal to palpation  RESP: lungs clear to auscultation - no rales, rhonchi or wheezes  CV: regular rate and rhythm, normal S1 S2, no S3 or S4, no murmur, click or rub, no peripheral edema and peripheral pulses strong  ABDOMEN: soft, nontender, no hepatosplenomegaly, no masses and bowel sounds normal  MS: no gross musculoskeletal defects noted, no edema  SKIN: no suspicious lesions or rashes    Diagnostic Test Results:  Labs reviewed in Epic  Results for orders placed or performed in visit on 12/31/19 (from the past 24 hour(s))   CBC with platelets   Result Value Ref Range    WBC 4.9 4.0 - 11.0 10e9/L    RBC Count 4.35 3.8 - 5.2 10e12/L    Hemoglobin 11.9 11.7 - 15.7 g/dL    Hematocrit 37.1 35.0 - 47.0 %    MCV 85 78 - 100 fl    MCH 27.4 26.5 - 33.0 pg    MCHC 32.1 31.5 - 36.5 g/dL    RDW 14.8 10.0 - 15.0 %    Platelet Count 271 150 - 450 10e9/L   ESR: Erythrocyte sedimentation rate   Result Value Ref Range    Sed Rate 8 0 - 20 mm/h     *Note: Due to a large number of results and/or encounters for the requested time period, some results have not been displayed. A complete set of results can be found in Results Review.           Assessment & Plan   Problem List Items Addressed This Visit     Tics - Tourette syndrome    Relevant Medications    guanFACINE (TENEX) 1 MG tablet    Fibromyalgia    Relevant Medications    amitriptyline (ELAVIL) 25 MG tablet    Behcet's disease (H)    Relevant Orders    CRP, inflammation (Completed)    ESR: Erythrocyte sedimentation rate (Completed)    CK total (Completed)    Urea nitrogen (Completed)      Other Visit Diagnoses     Acute deep vein thrombosis (DVT) of left upper extremity, unspecified vein (H)    -  Primary    Relevant Orders    US Extremity Upper Venous  lt    Oncology/Hematology Adult Referral    Dysmenorrhea        Relevant Orders    CBC with platelets (Completed)    US Pelvic Complete w Transvaginal     Pruritus        Relevant Medications    amitriptyline (ELAVIL) 25 MG tablet    TAHIR (generalized anxiety disorder)        Relevant Medications    amitriptyline (ELAVIL) 25 MG tablet    gabapentin (NEURONTIN) 300 MG capsule         -monitor DVT; Hematology referral; continue Xarelto for now.   -increase gabapentin and elavil  Follow up in about 3 weeks      Follow up 3 weeks  No follow-ups on file.    EVI Cardona [facility]=

## 2019-12-31 NOTE — TELEPHONE ENCOUNTER
[facility]. Dione Nur RN on 12/31/2019 at 4:47 PM

## 2020-01-01 LAB
BUN SERPL-MCNC: 9 MG/DL (ref 7–30)
CK SERPL-CCNC: 135 U/L (ref 30–225)

## 2020-01-07 ENCOUNTER — MYC MEDICAL ADVICE (OUTPATIENT)
Dept: PODIATRY | Facility: CLINIC | Age: 25
End: 2020-01-07

## 2020-01-07 ENCOUNTER — ANCILLARY PROCEDURE (OUTPATIENT)
Dept: ULTRASOUND IMAGING | Facility: CLINIC | Age: 25
End: 2020-01-07
Attending: NURSE PRACTITIONER
Payer: COMMERCIAL

## 2020-01-07 DIAGNOSIS — N94.6 DYSMENORRHEA: ICD-10-CM

## 2020-01-07 PROCEDURE — 76830 TRANSVAGINAL US NON-OB: CPT | Performed by: OBSTETRICS & GYNECOLOGY

## 2020-01-08 ENCOUNTER — OFFICE VISIT (OUTPATIENT)
Dept: PSYCHOLOGY | Facility: CLINIC | Age: 25
End: 2020-01-08
Payer: COMMERCIAL

## 2020-01-08 ENCOUNTER — OFFICE VISIT (OUTPATIENT)
Dept: PHYSICAL MEDICINE AND REHAB | Facility: CLINIC | Age: 25
End: 2020-01-08
Payer: COMMERCIAL

## 2020-01-08 DIAGNOSIS — F32.A DEPRESSION: ICD-10-CM

## 2020-01-08 DIAGNOSIS — G43.719 CHRONIC MIGRAINE WITHOUT AURA, INTRACTABLE, WITHOUT STATUS MIGRAINOSUS: Primary | ICD-10-CM

## 2020-01-08 DIAGNOSIS — F41.1 GAD (GENERALIZED ANXIETY DISORDER): Primary | ICD-10-CM

## 2020-01-08 PROCEDURE — 90834 PSYTX W PT 45 MINUTES: CPT | Performed by: COUNSELOR

## 2020-01-08 RX ORDER — BUPIVACAINE HYDROCHLORIDE 2.5 MG/ML
4 INJECTION, SOLUTION EPIDURAL; INFILTRATION; INTRACAUDAL ONCE
Status: COMPLETED | OUTPATIENT
Start: 2020-01-08 | End: 2020-01-08

## 2020-01-08 RX ADMIN — BUPIVACAINE HYDROCHLORIDE 10 MG: 2.5 INJECTION, SOLUTION EPIDURAL; INFILTRATION; INTRACAUDAL at 15:19

## 2020-01-08 ASSESSMENT — ANXIETY QUESTIONNAIRES
2. NOT BEING ABLE TO STOP OR CONTROL WORRYING: SEVERAL DAYS
7. FEELING AFRAID AS IF SOMETHING AWFUL MIGHT HAPPEN: SEVERAL DAYS
3. WORRYING TOO MUCH ABOUT DIFFERENT THINGS: SEVERAL DAYS
5. BEING SO RESTLESS THAT IT IS HARD TO SIT STILL: SEVERAL DAYS
1. FEELING NERVOUS, ANXIOUS, OR ON EDGE: MORE THAN HALF THE DAYS
IF YOU CHECKED OFF ANY PROBLEMS ON THIS QUESTIONNAIRE, HOW DIFFICULT HAVE THESE PROBLEMS MADE IT FOR YOU TO DO YOUR WORK, TAKE CARE OF THINGS AT HOME, OR GET ALONG WITH OTHER PEOPLE: SOMEWHAT DIFFICULT
GAD7 TOTAL SCORE: 7
6. BECOMING EASILY ANNOYED OR IRRITABLE: NOT AT ALL

## 2020-01-08 ASSESSMENT — PATIENT HEALTH QUESTIONNAIRE - PHQ9
5. POOR APPETITE OR OVEREATING: SEVERAL DAYS
SUM OF ALL RESPONSES TO PHQ QUESTIONS 1-9: 8
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
## 2020-01-08 NOTE — LETTER
1/8/2020       RE: Samara Oropeza  [address]  [address]  [place] [address]     Dear Colleague,    Thank you for referring your patient, Samara Oropeza, to the [facility] PHYSICAL MEDICINE AND REHABILITATION at [facility]. Please see a copy of my visit note below.    BOTULINUM TOXIN PROCEDURE - HEADACHE - NOTE    No chief complaint on file.    There were no vitals taken for this visit.     Current Outpatient Medications:      albuterol (PROAIR HFA/PROVENTIL HFA/VENTOLIN HFA) 108 (90 BASE) MCG/ACT Inhaler, Inhale 2 puffs into the lungs every 6 hours as needed for shortness of breath / dyspnea or wheezing, Disp: 1 Inhaler, Rfl: 1     amitriptyline (ELAVIL) 25 MG tablet, Take 2 tablets (50 mg) by mouth At Bedtime, Disp: 180 tablet, Rfl: 1     artificial tears OINT ophthalmic ointment, 0.5 inch strip each eye at night, Disp: 1 Tube, Rfl: 11     benzocaine (TOPICALE XTRA) 20 % GEL, Apply as needed locally to mouth or nasal ulcers for pain; 4 times daily as needed, Disp: 30 g, Rfl: 1     betamethasone valerate (VALISONE) 0.1 % cream, Apply topically 2 times daily as needed , Disp: , Rfl:      bisacodyl (DULCOLAX) 5 MG EC tablet, Take 2 tablets (10 mg) by mouth daily as needed for constipation, Disp: 30 tablet, Rfl: 0     citalopram (CELEXA) 20 MG tablet, Take 1 tablet (20 mg) by mouth daily, Disp: 90 tablet, Rfl: 1     clobetasol (TEMOVATE) 0.05 % cream, Apply topically 2 times daily, Disp: 60 g, Rfl: 0     colchicine (COLCYRS) 0.6 MG tablet, TAKE 1 TABLET (0.6 MG) BY MOUTH 2 TIMES DAILY, Disp: 180 tablet, Rfl: 1     cyproheptadine (PERIACTIN) 4 MG tablet, Take 2 tablets (8 mg) by mouth At Bedtime For nightmares, Disp: 180 tablet, Rfl: 2     dicyclomine (BENTYL) 20 MG tablet, Take 1 tablet (20 mg) by mouth 4 times daily as needed, Disp: 120 tablet, Rfl: 3     diphenhydrAMINE (BENADRYL) 25 MG tablet, Take 1 tablet (25 mg) by mouth 3 times daily as needed (itching), Disp: 40  tablet, Rfl: 1     EPINEPHrine (EPIPEN 2-EAMON) 0.3 MG/0.3ML injection, Inject 0.3 mLs (0.3 mg) into the muscle as needed for anaphylaxis, Disp: 0.6 mL, Rfl: 3     furosemide (LASIX) 20 MG tablet, Take 1 tablet (20 mg) by mouth daily, Disp: 60 tablet, Rfl: 3     gabapentin (NEURONTIN) 300 MG capsule, Take 1 capsule (300 mg) by mouth 3 times daily, Disp: 90 capsule, Rfl: 1     guanFACINE (TENEX) 1 MG tablet, TAKE 3 TABLETS (3 MG) BY MOUTH TWICE DAILY IN THE MORNING AND EVENING., Disp: 180 tablet, Rfl: 5     hydrocortisone 1 % CREA cream, Place rectally 2 times daily as needed for itching, Disp: 28.35 g, Rfl: 1     hydrOXYzine (ATARAX) 25 MG tablet, Take 1-2 tablets every 4-6 hours as needed for pain control., Disp: 30 tablet, Rfl: 0     hyoscyamine (ANASPAZ/LEVSIN) 0.125 MG tablet, Take 1-2 tablets (125-250 mcg) by mouth every 4 hours as needed for cramping, Disp: 40 tablet, Rfl: 1     hypromellose (GENTEAL) 0.3 % SOLN, 1 drop every hour as needed for dry eyes , Disp: , Rfl:      lactulose 20 GM/30ML SOLN, Take 30 mLs by mouth 3 times daily as needed (for constipation), Disp: 300 mL, Rfl: 3     lidocaine (LMX4) 4 % external cream, Apply topically once as needed for mild pain, Disp: 120 g, Rfl: 1     lidocaine (LMX4) 4 % external cream, Apply 1 Application topically once as needed for mild pain, Disp: , Rfl:      LINZESS 290 MCG capsule, TAKE 1 CAPSULE BY MOUTH EVERY MORNING BEFORE BREAKFAST, Disp: 90 capsule, Rfl: 0     LORazepam (ATIVAN) 0.5 MG tablet, Take 1 tablet (0.5 mg) by mouth every 6 hours as needed for anxiety, Disp: 10 tablet, Rfl: 0     ondansetron (ZOFRAN-ODT) 8 MG ODT tab, Take 1 tablet (8 mg) by mouth every 8 hours as needed for nausea, Disp: 180 tablet, Rfl: 1     order for DME, Equipment being ordered: 8 1/2 tall boot, Disp: 1 Device, Rfl: 0     order for DME, Equipment being ordered: RollAbout knee scooter x 3 months, Disp: 1 Device, Rfl: 0     order for DME, Equipment being ordered: Trilok brace 8  1/2 left lower extremity, Disp: 1 Device, Rfl: 0     order for DME, Roll-A-Bout Walker. Patient can use for 3 months, Disp: 1 Units, Rfl: 0     order for DME, Equipment being ordered: size 8 1/2 walking boot tall, Disp: 1 Device, Rfl: 0     order for DME, Equipment being ordered: RollAbout knee scooter, Disp: 1 Device, Rfl: 0     polyethylene glycol (MIRALAX/GLYCOLAX) powder, Take 1 capful by mouth 3 times daily, Disp: , Rfl:      promethazine (PHENERGAN) 12.5 MG tablet, Take 1-2 tablets (12.5-25 mg) by mouth every 6 hours as needed for nausea, Disp: 30 tablet, Rfl: 3     rivaroxaban ANTICOAGULANT (XARELTO) 15 MG TABS tablet, Take 15 mg by mouth 2 times daily, Disp: , Rfl:      sodium chloride 0.9% SOLN BOLUS, Inject 1,000 mLs into the vein daily as needed (IV abx and flushes), Disp: 1000 mL, Rfl: 11     sucralfate (CARAFATE) 1 GM/10ML suspension, Take 10 mLs (1 g) by mouth 4 times daily, Disp: 1200 mL, Rfl: 2     triamcinolone (KENALOG) 0.5 % external ointment, Apply 1 g topically 3 times daily as needed for irritation, Disp: 15 g, Rfl: 3  No current facility-administered medications for this visit.      Allergies   Allergen Reactions     Amoxil [Penicillins] Rash     Dad unsure of reaction.     Bee Venom Anaphylaxis     Bioflavonoids Anaphylaxis     Contrast Dye Rash     Contrast Media Ready-Box [facility], 04/09/2014.; Contrast Media Ready-Box [facility], 04/09/2014.  NOTE: this is a contrast media oral with iodine. Premedicate with methylpred standard for IV contrast, request barium contrast for oral contrast.     Diagnostic X-Ray Materials Hives and Rash     Contrast Media Ready-Box [facility], 04/09/2014.; Contrast Media Ready-[facility], 04/09/2014.  NOTE: this is a contrast media oral with iodine. Premedicate with methylpred standard for IV contrast, request barium contrast for oral contrast.     Orange Fruit [Citrus] Anaphylaxis     Pineapple Anaphylaxis, Difficulty breathing and Rash     Reglan [Metoclopramide] Other (See  "Comments)     IV dose only, in ER, rapid heart rate.     Ace Inhibitors      Difficulty in breathing and GI upset     Amitiza [Lubiprostone] Nausea and Vomiting     Amoxicillin-Pot Clavulanate      Midazolam Unknown     parent states that when pt takes this medication, she wakes up being very violent .     No Clinical Screening - See Comments      Bleech/ chest tightness, itchy throat, swollen tongue, hives     Tizanidine Other (See Comments)     Confusion, back pain, photophobia, abdominal pain, shaking, anxious       Versed      Coming out of pelvic exam at age of 5, was kicking and screaming when coming out of the versed.     Adhesive Tape Rash     Azithromycin Hives and Rash     Cephalexin Itching and Rash        PHYSICAL EXAM:    Pt reports migraine headache today, rates 6/10.      HPI:    Patient reports the following new medical problems since last visit: Patient was seen in the ER approximately 6 weeks ago for blood clots in the left arm.  She is following with her PCP regarding this ongoing issue.  She was placed on Xeralto for treatment.  She denies any additional new medical issues, hospitalizations, ER visits, serious injuries or falls since her previous visit on 10/17/2019.    We reviewed the recommended safety guidelines for  Botox from any vaccine injection, such as the seasonal flu vaccine, by a minimum of 10-14 days with Samara Oropeza. She acknowledged understanding.    RESPONSE TO PREVIOUS TREATMENT:  Change in headache pattern following last series of injections with 185 units of  Botox on 10/17/2019.  Patient reports less benefit and shorter duration of effect with this series of injections.    Muscle weakness:  Rated as 'Mild' severity.  Duration: 4 weeks then fully resolved.  Injection site pain:  Rated as 'Moderate' severity.  Duration: 7 days then fully resolved.  Patient also reports that she felt \"warm and feverish\" after the injectionc.  This lasted for the day of the " procedure, then resolved.    1.  Headache frequency during this injection cycle:  20 headache days per month.  This is a significant worsening from her previous report.  This is compared to her baseline headache frequency of 30 headache days per month.     2.  Headache duration during this injection cycle:  Headache duration ranged from 2 hours to 3 days.   Patient reports two episodes of multiple day headaches during this injection cycle.    3.  Headache intensity during this injection cycle:    A.  3/10  =  Typical pain level.  This is an improvement from her previous report of 9/10.  B.  8/10  =  Worst pain level.  This is an improvement from her previous report of 9/10.  C.  0/10  =  Lowest pain level.    4.  Change in headache medication usage during this injection cycle:  (For Example:  Able to decrease use of oral pain medications.) Patient reports that she did utilize any migraine pain medication during this injection cycle.    5.  ER Visits During This Injection Cycle:  None due to migraine headache.    6.  Functional Performance:  Change in ADL's, social interaction, days lost from work, etc. Patient has been able to go back to work for 32 hours per week.  She did not miss any days from work due to migraine headache during this injection cycle.      BOTULINUM NEUROTOXIN INJECTION PROCEDURES:    VERIFICATION OF PATIENT IDENTIFICATION AND PROCEDURE     Initials   Patient Name tlb   Patient  tlb   Procedure Verified by: [email or web address]     Prior to the start of the procedure and with procedural staff participation, I verbally confirmed the patient s identity using two indicators, relevant allergies, that the procedure was appropriate and matched the consent or emergent situation, and that the correct equipment/implants were available. Immediately prior to starting the procedure I conducted the Time Out with the procedural staff and re-confirmed the patient s name, procedure, and site/side. (The Joint Commission  universal protocol was followed.)  Yes    Sedation (Moderate or Deep): None    Above assessments performed by:    Marcelle Richardson PT Care Coordinator    Alejandrina Hernandez MD      INDICATIONS FOR PROCEDURES:  Samara Oropeza is a 24 year old patient with a complex medical history that includes chronic migraine headaches associated with cervicogenic components.      Her baseline symptoms have been recalcitrant to oral medications and conservative therapy.  She is here today for re-evaluation and it was decided to reinject with Botox.     GOAL OF PROCEDURE:  The goal of this procedure is to decrease pain .    TOTAL DOSE ADMINISTERED:  Dose Administered:  185 units  Botox (Botulinum Toxin Type A)       2:1 Dilution   Diluent Used:  0.25% Sensorcaine  (NDC: 55150-167-10  Lot: [id number]  Exp: 11/2021)  Total Volume of Diluent Used:  4 ml  Lot # /C3 with Expiration Date:  05/2022  NDC #: Botox 100u ([address]-69)    Was there drug waste? Yes  Amount of drug waste (mL): 15 units Botox.  Reason for waste:  Single use vial  Multi-dose vial: No    Alejandrina Hernandez MD  January 8, 2020    Medication guide was offered to patient and was declined.    CONSENT:  The risks, benefits, and treatment options were discussed with Samara Oropeza and she agreed to proceed.    Written consent was obtained by TLB.     EQUIPMENT USED:  Needle-30 gauge    SKIN PREPARATION:  Skin preparation was performed using an alcohol wipe.     GUIDANCE DESCRIPTION:  No guidance was utilized.     AREA/MUSCLE INJECTED:  185 UNITS BOTOX = TOTAL DOSE     1 & 2. SHOULDER GIRDLE & NECK MUSCLES: 3 5 units Botox = Total Dose, 2:1 Dilution      Right Splenius -  5 units of Botox at 1 site/s.   Left Splenius -  5 units of Botox at 1 site/s.     Right Trapezius - 7.5 units of Botox at 3 sites/s.  Left Trapezius - 7.5 units of Botox at 3 site/s.      Right Levator Scapulae - 5 units of Botox at 1 site/s (shoulder muscles).   Left Levator Scapulae - 5  units of Botox at 1 site/s (shoulder muscles).     3. HEAD & SCALP MUSCLES: 150 units Botox = Total Dose, 2:1 Dilution     Right Occipitalis - 5 units of Botox at 3 site/s.   Left Occipitalis - 5 units of Botox at 3 site/s.     Right Frontalis - 15 units of Botox at 4 site/s.  Left Frontalis - 15 units of Botox at 4 site/s.     Right Temporalis - 50 units of Botox at 8 site/s.  Left Temporalis - 50 units of Botox at 8 site/s.     Right  - 2.5 units of Botox at 1 site/s.              Left  - 5 units of Botox at 1 site/s.                 Procerus - 2.5 units of Botox at 1 site/s.    RESPONSE TO PROCEDURE:  Samara Oropeza tolerated the procedure well and there were no immediate complications.   She was allowed to recover for an appropriate period of time and was discharged home in stable condition.    FOLLOW UP:  Samara Oropeza was asked to follow up by phone in 7-14 days with Marcelle Richardson PT, Care Coordinator or Vidya Dickinson RN, Care Coordinator, to report her response to this series of injections.  Based on the patient's previous response to this therapy, Samara Oropeza was rescheduled for the next series of injections in 12 weeks.    PLAN (Medication Changes, Therapy Orders, Work or Disability Issues, etc.):  Patient will continue to monitor response to today's injections.    Again, thank you for allowing me to participate in the care of your patient.      Sincerely,    Alejandrina Hernandez MD

## 2020-01-09 ENCOUNTER — MYC MEDICAL ADVICE (OUTPATIENT)
Dept: FAMILY MEDICINE | Facility: CLINIC | Age: 25
End: 2020-01-09

## 2020-01-09 DIAGNOSIS — I82.629 DEEP VEIN THROMBOSIS (DVT) OF UPPER EXTREMITY, UNSPECIFIED CHRONICITY, UNSPECIFIED LATERALITY, UNSPECIFIED VEIN (H): Primary | ICD-10-CM

## 2020-01-09 RX ORDER — OXYCODONE HYDROCHLORIDE 10 MG/1
TABLET ORAL
Status: ON HOLD | COMMUNITY
Start: 2019-09-25 | End: 2020-02-25

## 2020-01-09 RX ORDER — AMITRIPTYLINE HYDROCHLORIDE 50 MG/1
TABLET ORAL
COMMUNITY
Start: 2019-02-12 | End: 2020-01-09

## 2020-01-09 RX ORDER — HYDROCODONE BITARTRATE AND ACETAMINOPHEN 5; 325 MG/1; MG/1
1-2 TABLET ORAL
COMMUNITY
Start: 2019-12-01 | End: 2020-07-27

## 2020-01-09 RX ORDER — FLUCONAZOLE 150 MG/1
TABLET ORAL
COMMUNITY
Start: 2019-06-03 | End: 2020-07-27

## 2020-01-09 RX ORDER — NORGESTIMATE AND ETHINYL ESTRADIOL 0.25-0.035
KIT ORAL
Status: ON HOLD | COMMUNITY
Start: 2019-06-11 | End: 2020-02-25

## 2020-01-09 RX ORDER — IBUPROFEN 600 MG/1
TABLET, FILM COATED ORAL
COMMUNITY
Start: 2019-09-25 | End: 2020-01-09

## 2020-01-09 RX ORDER — ONDANSETRON 8 MG/1
TABLET, FILM COATED ORAL
Status: ON HOLD | COMMUNITY
Start: 2019-02-12 | End: 2020-02-25

## 2020-01-09 RX ORDER — ASPIRIN 81 MG/1
81 TABLET, CHEWABLE ORAL
COMMUNITY
Start: 2019-03-25 | End: 2020-01-09

## 2020-01-09 RX ORDER — METHYLPREDNISOLONE 32 MG/1
TABLET ORAL
Refills: 0 | COMMUNITY
Start: 2019-11-11 | End: 2020-07-27

## 2020-01-09 RX ORDER — CLINDAMYCIN HCL 300 MG
CAPSULE ORAL
Refills: 0 | Status: ON HOLD | COMMUNITY
Start: 2019-11-04 | End: 2020-02-25

## 2020-01-09 RX ORDER — OXYCODONE HYDROCHLORIDE 5 MG/1
TABLET ORAL
COMMUNITY
Start: 2019-05-10 | End: 2020-07-27

## 2020-01-09 ASSESSMENT — ANXIETY QUESTIONNAIRES: GAD7 TOTAL SCORE: 7

## 2020-01-09 NOTE — TELEPHONE ENCOUNTER
Wrote patient back on [facility] with providers message. Dione Nur RN on 1/9/2020 at 1:35 PM

## 2020-01-09 NOTE — TELEPHONE ENCOUNTER
Requested Prescriptions   Pending Prescriptions Disp Refills     rivaroxaban ANTICOAGULANT (XARELTO) 15 MG TABS tablet       Sig: Take 1 tablet (15 mg) by mouth 2 times daily       There is no refill protocol information for this order      Last Written Prescription Date:  12/9/19  Last Fill Quantity: na,   # refills: na  Last Office Visit: 12/31/19 with ERNESTO Abreu.   Future Office visit:    Next 5 appointments (look out 90 days)    Jan 14, 2020 11:30 AM CST  Office Visit [place] Shagufta [place]kson [place]  [facility] [place] [address]  [phone]   Jan 21, 2020 10:00 AM CST  Return Visit with Ernestina Patel [facility] ([place] [address] [place] [id number]  [phone]   Feb 05, 2020  1:30 PM CST  Return Visit with Ernestina Patel [facility] ([place] [address] [place] [id number]  [phone]           Routing refill request to provider for review/approval because:  Drug not on the G, P or  Health refill protocol or controlled substance

## 2020-01-09 NOTE — TELEPHONE ENCOUNTER
Could you please let Samara know that I have changed the Xarelto to 20 mg once daily with supper, and she should continue this for the next three months; we will decide than if she needs to stay on it.

## 2020-01-14 ENCOUNTER — OFFICE VISIT (OUTPATIENT)
Dept: OBGYN | Facility: CLINIC | Age: 25
End: 2020-01-14
Attending: NURSE PRACTITIONER
Payer: COMMERCIAL

## 2020-01-14 VITALS — OXYGEN SATURATION: 100 % | HEART RATE: 108 BPM | BODY MASS INDEX: 27.99 KG/M2 | WEIGHT: 158 LBS

## 2020-01-14 DIAGNOSIS — Z30.09 GENERAL COUNSELING AND ADVICE ON FEMALE CONTRACEPTION: Primary | ICD-10-CM

## 2020-01-14 PROCEDURE — 99203 OFFICE O/P NEW LOW 30 MIN: CPT | Performed by: OBSTETRICS & GYNECOLOGY

## 2020-01-14 NOTE — PROGRESS NOTES
SUBJECTIVE:  Samara Oropeza is a 24 year old  who presents to evaluate abnormal bleeding, history of painful periods, contraception options status post DVT.  She was diagnosed with a left arm DVT, is currently taking Xarelto, would like to make some plans for contraception.  About a week ago she stopped the combination oral contraceptive pill she has been taking.  She is worried that she might have fibroids.  She states she is unable to consider an IUD as she has a history of Behcet's disease and develops vulvar ulcers.  She used Depo-Provera shots when she was 13, stopped using them and menses did not resume for over a year.  She has been taking the oral contraceptive pills for about 8 years.    We reviewed the ultrasound on 2020.  The uterus was normal.  The endometrium measured 3.6 mm.  Both ovaries were normal, no free fluid was seen in the pelvis.    OBJECTIVE: Pulse 108   Wt 71.7 kg (158 lb)   LMP 2019   SpO2 100%   Breastfeeding No   BMI 27.99 kg/m   Patient was not otherwise examined.      ASSESSMENT: Normal pelvic ultrasound.  Desire for contraception.  Status post DVT, on anticoagulation.    PLAN: We discussed dysmenorrhea, pelvic symptoms.  We discussed options of contraception.  We discussed progestin only contraception.  She will be meeting with hematology, and will confirm with them that she would be a candidate for progestin only contraception.  She thinks she would probably like Nexplanon, is aware that I do not do these but she could schedule an insertion with 1 of the partners or 1 of the midwives who does.    Please note greater than 50% of this 30 minute appointment were spent in counseling with the patient of the issues described above in the history of present illness and in the plan, including evaluation and managment of contraception options after DVT.

## 2020-01-21 ENCOUNTER — OFFICE VISIT (OUTPATIENT)
Dept: PSYCHOLOGY | Facility: CLINIC | Age: 25
End: 2020-01-21
Payer: COMMERCIAL

## 2020-01-21 DIAGNOSIS — F32.A DEPRESSION: ICD-10-CM

## 2020-01-21 DIAGNOSIS — F41.1 GAD (GENERALIZED ANXIETY DISORDER): Primary | ICD-10-CM

## 2020-01-21 PROCEDURE — 90834 PSYTX W PT 45 MINUTES: CPT | Performed by: COUNSELOR

## 2020-01-21 ASSESSMENT — ANXIETY QUESTIONNAIRES
3. WORRYING TOO MUCH ABOUT DIFFERENT THINGS: MORE THAN HALF THE DAYS
6. BECOMING EASILY ANNOYED OR IRRITABLE: NOT AT ALL
2. NOT BEING ABLE TO STOP OR CONTROL WORRYING: SEVERAL DAYS
GAD7 TOTAL SCORE: 9
5. BEING SO RESTLESS THAT IT IS HARD TO SIT STILL: SEVERAL DAYS
7. FEELING AFRAID AS IF SOMETHING AWFUL MIGHT HAPPEN: MORE THAN HALF THE DAYS
IF YOU CHECKED OFF ANY PROBLEMS ON THIS QUESTIONNAIRE, HOW DIFFICULT HAVE THESE PROBLEMS MADE IT FOR YOU TO DO YOUR WORK, TAKE CARE OF THINGS AT HOME, OR GET ALONG WITH OTHER PEOPLE: SOMEWHAT DIFFICULT
1. FEELING NERVOUS, ANXIOUS, OR ON EDGE: SEVERAL DAYS

## 2020-01-21 ASSESSMENT — PATIENT HEALTH QUESTIONNAIRE - PHQ9
SUM OF ALL RESPONSES TO PHQ QUESTIONS 1-9: 7
5. POOR APPETITE OR OVEREATING: MORE THAN HALF THE DAYS
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
## 2020-01-22 ENCOUNTER — OFFICE VISIT (OUTPATIENT)
Dept: NEUROLOGY | Facility: CLINIC | Age: 25
End: 2020-01-22
Payer: COMMERCIAL

## 2020-01-22 VITALS — SYSTOLIC BLOOD PRESSURE: 119 MMHG | HEART RATE: 130 BPM | DIASTOLIC BLOOD PRESSURE: 82 MMHG | OXYGEN SATURATION: 98 %

## 2020-01-22 DIAGNOSIS — G54.0 THORACIC OUTLET SYNDROME: Primary | ICD-10-CM

## 2020-01-22 RX ORDER — FUROSEMIDE 40 MG
40 TABLET ORAL 2 TIMES DAILY
Qty: 60 TABLET | Refills: 3 | Status: SHIPPED | OUTPATIENT
Start: 2020-01-22 | End: 2020-07-27

## 2020-01-22 ASSESSMENT — HEADACHE IMPACT TEST (HIT 6)
HOW OFTEN DID HEADACHS LIMIT CONCENTRATION ON WORK OR DAILY ACTIVITY: SOMETIMES
HOW OFTEN HAVE YOU FELT TOO TIRED TO WORK BECAUSE OF YOUR HEADACHES: SOMETIMES
HOW OFTEN DO HEADACHES LIMIT YOUR DAILY ACTIVITIES: SOMETIMES
HIT6 TOTAL SCORE: 57
HOW OFTEN DID HEADACHS LIMIT CONCENTRATION ON WORK OR DAILY ACTIVITY: SOMETIMES
WHEN YOU HAVE A HEADACHE HOW OFTEN DO YOU WISH YOU COULD LIE DOWN: RARELY
HOW OFTEN HAVE YOU FELT TOO TIRED TO WORK BECAUSE OF YOUR HEADACHES: SOMETIMES
HOW OFTEN HAVE YOU FELT FED UP OR IRRITATED BECAUSE OF YOUR HEADACHES: RARELY
HIT6 TOTAL SCORE: 57
HOW OFTEN DO HEADACHES LIMIT YOUR DAILY ACTIVITIES: SOMETIMES
WHEN YOU HAVE HEADACHES HOW OFTEN IS THE PAIN SEVERE: VERY OFTEN
HOW OFTEN HAVE YOU FELT FED UP OR IRRITATED BECAUSE OF YOUR HEADACHES: RARELY
WHEN YOU HAVE HEADACHES HOW OFTEN IS THE PAIN SEVERE: VERY OFTEN
WHEN YOU HAVE A HEADACHE HOW OFTEN DO YOU WISH YOU COULD LIE DOWN: RARELY

## 2020-01-22 ASSESSMENT — PAIN SCALES - GENERAL: PAINLEVEL: MODERATE PAIN (5)

## 2020-01-22 ASSESSMENT — ANXIETY QUESTIONNAIRES: GAD7 TOTAL SCORE: 9

## 2020-01-22 NOTE — PROGRESS NOTES
[facility]    Neurology Consult    1/22/2020      Samara Oropeza MRN# [id number]   YOB: 1995 Age: 24 year old      Primary care provider:   Sonja Abreu    Requesting provider:   Follow-up    Reason for Consult:  Headaches and thoracic outlet syndrome      HISTORY OF PRESENT ILLNESS:  Samara Oropeza is a 24 year old female with a past medical history of migraine headaches, bilateral arm tingling, hand swelling, and unexplained spells who returns for follow-up after our 11-5-2019 consultation.  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  She developed a blood clot in the left basilic vein, the side through which the contrast was injected.  She was put on Xarelto.  Since our visit, she has had headache for 15-20 days per month.  Her TOS ultrasound on 12-6-2019 showed flattening of the arterial waveforms with arm abduction.  The venous test was normal.  Her Chest CT did not reveal any structural cause for TOS.     Headache history: She had her last Botox injection with Dr. Hernandez in PM&R on 1-6-2020.  In the past she has responded very well to Botox but her last session had been less effective for unknown reasons.  Her last headache free day was 4 days ago.  She woke up on Monday with a migraine headache and has had the headache since then.  The headache has been at the base of the skull radiating to the temples.  There is some nausea.  The headache doesn't change much when she lays down.  She does wake up in the middle of the night with a headaches which she treats with an ice pack.  She may take an Excedrin Migraine, which normally does work except for the severe headaches.  She also notes that when she pushes on the upper part of her left breast where there is a small nodule, she can get radiating pain into the head.     She has episodes of high pitched tinnitus  along with pressure in the back of her headache with shortness of breath.  She will feel hot all over her body but especially her head.  She feels that her hands are swollen all the time.  She also feels like she will pass out when the episodes happen.  These episodes are much worse when she exercises, including doing weight.  Her main preventative regimen is amitriptyline, gabapentin, lasix, and Botox.    She is taking Lasix 40mg once a day.  She takes it in the morning-to noon.  She is tolerating it.  She does think that it has helped the swelling in her hands and legs but her headaches are still severe.     Arm pain: The pain and swelling has gone down quite a bit. She thinks that radiology should be contacting her to follow-up on the clots.      PAST MEDICAL HISTORY:  Past Medical History:   Diagnosis Date     Anemia      Anxiety      Arthritis      Behcet's disease (H)      Cervical adenitis May 2010     Chronic abdominal pain      Constipation, chronic 1994     Fibromyalgia      Gastro-oesophageal reflux disease      Gastroparesis      Irregular heart beat     tachycardia, has had workup     Migraines      Neuromuscular disorder (H)     fibramyalgia     Palpitations      PONV (postoperative nausea and vomiting)      Seizure (H)      Seizures (H)     unknown etiology     Syncope      Tourette's         PAST SURGICAL HISTORY:  Past Surgical History:   Procedure Laterality Date     ARTHROSCOPY ANKLE, OPEN REPAIR LIGAMENT, COMBINED Left 9/25/2019    Procedure: LEFT ANKLE ARTHROSCOPY WITH LIGAMENT REPAIR;  Surgeon: Andres Johnson DPM;  Location:  OR     ARTHROSCOPY ANKLE, REPAIR LIGAMENT Left 1/2/2019    Procedure: Ankle arthroscopy and sinus tarsi evacuation, ligament repair, left lower extremity;  Surgeon: Andres Johnson DPM;  Location:  OR     ARTHROSCOPY KNEE WITH PATELLAR REALIGNMENT  7/25/2013    Procedure: ARTHROSCOPY KNEE WITH PATELLAR REALIGNMENT;  Left Knee Arthroscopy, Medial  Patellofemoral Ligament Reconstruction with Allograft  ;  Surgeon: Jennifer Acevedo MD;  Location: [facility]     COLONOSCOPY  2015     DENTAL SURGERY  1996    Teeth removal     ENDOSCOPY UPPER, COLONOSCOPY, COMBINED  2005     HC ESOPH/GAS REFLUX TEST W NASAL IMPED >1 HR N/A 2/15/2017    Procedure: ESOPHAGEAL IMPEDENCE FUNCTION TEST WITH 24 HOUR PH GREATER THAN 1 HOUR;  Surgeon: Timothy Matta MD;  Location: [facility]     IR PICC PLACEMENT > 5 YRS OF AGE  3/13/2019     IRRIGATION AND DEBRIDEMENT FOOT, COMBINED Left 3/12/2019    Procedure: COMBINED IRRIGATION AND DEBRIDEMENT LEFT ANKLE;  Surgeon: Micha Glover MD;  Location: [facility]     IRRIGATION AND DEBRIDEMENT LOWER EXTREMITY, COMBINED Left 5/7/2019    Procedure: 1.  Excision of wound down to and including deep fascia, less than 20 cm2.  2.  Irrigation and debridement, left ankle.;  Surgeon: Andres Johnson DPM;  Location: [facility] OR     PICC INSERTION Left 05/05/2019    4Fr - 45cm (5cm external), Basilic vein, SVC RA junction        SOCIAL HISTORY:  Has not been back to work since her blood clot since her work involves a lot of lifting.     ALLERGIES:     Allergies   Allergen Reactions     Amoxil [Penicillins] Rash     Dad unsure of reaction.     Bee Venom Anaphylaxis     Bioflavonoids Anaphylaxis     Contrast Dye Rash     Contrast Media [facility] [place], 04/09/2014.; Contrast Media [facility] [place], 04/09/2014.  NOTE: this is a contrast media oral with iodine. Premedicate with methylpred standard for IV contrast, request barium contrast for oral contrast.     Diagnostic X-Ray Materials Hives and Rash     Contrast Media [facility] [place], 04/09/2014.; Contrast [facility] [place], 04/09/2014.  NOTE: this is a contrast media oral with iodine. Premedicate with methylpred standard for IV contrast, request barium contrast for oral contrast.     Orange Fruit [Citrus] Anaphylaxis     Pineapple Anaphylaxis, Difficulty breathing and Rash     Reglan  [Metoclopramide] Other (See Comments)     IV dose only, in ER, rapid heart rate.     Ace Inhibitors      Difficulty in breathing and GI upset     Amitiza [Lubiprostone] Nausea and Vomiting     Amoxicillin-Pot Clavulanate      Midazolam Unknown     parent states that when pt takes this medication, she wakes up being very violent .     No Clinical Screening - See Comments      Bleech/ chest tightness, itchy throat, swollen tongue, hives     Tizanidine Other (See Comments)     Confusion, back pain, photophobia, abdominal pain, shaking, anxious       Versed      Coming out of pelvic exam at age of 5, was kicking and screaming when coming out of the versed.     Adhesive Tape Rash     Azithromycin Hives and Rash     Cephalexin Itching and Rash        MEDICATIONS:    Current Outpatient Medications:      albuterol (PROAIR HFA/PROVENTIL HFA/VENTOLIN HFA) 108 (90 BASE) MCG/ACT Inhaler, Inhale 2 puffs into the lungs every 6 hours as needed for shortness of breath / dyspnea or wheezing, Disp: 1 Inhaler, Rfl: 1     amitriptyline (ELAVIL) 25 MG tablet, Take 2 tablets (50 mg) by mouth At Bedtime, Disp: 180 tablet, Rfl: 1     Apremilast (OTEZLA) 10 & 20 & 30 MG TBPK, Follow package directions, Disp: , Rfl:      artificial tears OINT ophthalmic ointment, 0.5 inch strip each eye at night, Disp: 1 Tube, Rfl: 11     benzocaine (TOPICALE XTRA) 20 % GEL, Apply as needed locally to mouth or nasal ulcers for pain; 4 times daily as needed, Disp: 30 g, Rfl: 1     betamethasone valerate (VALISONE) 0.1 % cream, Apply topically 2 times daily as needed , Disp: , Rfl:      bisacodyl (DULCOLAX) 5 MG EC tablet, Take 2 tablets (10 mg) by mouth daily as needed for constipation, Disp: 30 tablet, Rfl: 0     citalopram (CELEXA) 20 MG tablet, Take 1 tablet (20 mg) by mouth daily, Disp: 90 tablet, Rfl: 1     clobetasol (TEMOVATE) 0.05 % cream, Apply topically 2 times daily, Disp: 60 g, Rfl: 0     colchicine (COLCYRS) 0.6 MG tablet, TAKE 1 TABLET (0.6 MG)  BY MOUTH 2 TIMES DAILY, Disp: 180 tablet, Rfl: 1     cyproheptadine (PERIACTIN) 4 MG tablet, Take 2 tablets (8 mg) by mouth At Bedtime For nightmares, Disp: 180 tablet, Rfl: 2     dicyclomine (BENTYL) 20 MG tablet, Take 1 tablet (20 mg) by mouth 4 times daily as needed, Disp: 120 tablet, Rfl: 3     diphenhydrAMINE (BENADRYL) 25 MG tablet, Take 1 tablet (25 mg) by mouth 3 times daily as needed (itching), Disp: 40 tablet, Rfl: 1     EPINEPHrine (EPIPEN 2-EAMON) 0.3 MG/0.3ML injection, Inject 0.3 mLs (0.3 mg) into the muscle as needed for anaphylaxis, Disp: 0.6 mL, Rfl: 3     furosemide (LASIX) 20 MG tablet, Take 1 tablet (20 mg) by mouth daily, Disp: 60 tablet, Rfl: 3     gabapentin (NEURONTIN) 300 MG capsule, Take 1 capsule (300 mg) by mouth 3 times daily, Disp: 90 capsule, Rfl: 1     guanFACINE (TENEX) 1 MG tablet, TAKE 3 TABLETS (3 MG) BY MOUTH TWICE DAILY IN THE MORNING AND EVENING., Disp: 180 tablet, Rfl: 5     HYDROcodone-acetaminophen (NORCO) 5-325 MG tablet, Take 1-2 tablets by mouth, Disp: , Rfl:      hydrocortisone 1 % CREA cream, Place rectally 2 times daily as needed for itching, Disp: 28.35 g, Rfl: 1     hydrOXYzine (ATARAX) 25 MG tablet, Take 1-2 tablets every 4-6 hours as needed for pain control., Disp: 30 tablet, Rfl: 0     hyoscyamine (ANASPAZ/LEVSIN) 0.125 MG tablet, Take 1-2 tablets (125-250 mcg) by mouth every 4 hours as needed for cramping, Disp: 40 tablet, Rfl: 1     hypromellose (GENTEAL) 0.3 % SOLN, 1 drop every hour as needed for dry eyes , Disp: , Rfl:      Lactase 9000 units CHEW, , Disp: , Rfl:      lactulose 20 GM/30ML SOLN, Take 30 mLs by mouth 3 times daily as needed (for constipation), Disp: 300 mL, Rfl: 3     lidocaine (LMX4) 4 % external cream, Apply topically once as needed for mild pain, Disp: 120 g, Rfl: 1     lidocaine (LMX4) 4 % external cream, Apply 1 Application topically once as needed for mild pain, Disp: , Rfl:      LINZESS 290 MCG capsule, TAKE 1 CAPSULE BY MOUTH EVERY  MORNING BEFORE BREAKFAST, Disp: 90 capsule, Rfl: 0     LORazepam (ATIVAN) 0.5 MG tablet, Take 1 tablet (0.5 mg) by mouth every 6 hours as needed for anxiety, Disp: 10 tablet, Rfl: 0     ondansetron (ZOFRAN) 8 MG tablet, , Disp: , Rfl:      polyethylene glycol (MIRALAX/GLYCOLAX) powder, Take 1 capful by mouth 3 times daily, Disp: , Rfl:      promethazine (PHENERGAN) 12.5 MG tablet, Take 1-2 tablets (12.5-25 mg) by mouth every 6 hours as needed for nausea, Disp: 30 tablet, Rfl: 3     rivaroxaban ANTICOAGULANT (XARELTO) 20 MG TABS tablet, Take 1 tablet (20 mg) by mouth daily (with dinner), Disp: 90 tablet, Rfl: 0     sucralfate (CARAFATE) 1 GM/10ML suspension, Take 10 mLs (1 g) by mouth 4 times daily, Disp: 1200 mL, Rfl: 2     tiZANidine (ZANAFLEX) 4 MG tablet, , Disp: , Rfl:      triamcinolone (KENALOG) 0.5 % external ointment, Apply 1 g topically 3 times daily as needed for irritation, Disp: 15 g, Rfl: 3     REVIEW OF SYSTEM:  Skin: Blisters on hand  Eyes: negative  Ears/Nose/Throat: negative  Respiratory: Positive shortness of breath  Cardiovascular: negative  Gastrointestinal: negative  Genitourinary: negative  Musculoskeletal: negative  Neurologic: headache  Psychiatric: negative  Hematologic/Lymphatic/Immunologic: blood clot in left arm being treated  Endocrine: negative    PHYSICAL EXAM:  Vitals: /82 (BP Location: Left arm, Patient Position: Sitting, Cuff Size: Adult Large)   Pulse 130   SpO2 98%     General: Patient is well-nourished, well-groomed, in no apparent distress but she is quiet and resigned.     HEENT: Head is atraumatic, eyes look normal exteriorly, throat clear, neck supple.    Neurologic:  Mental Status: Alert and oriented to person, place, time, and situation.  Able to provide and excellent history.     Cranial Nerves: Visual fields full to confrontation.   Extraocular movements full.  Face sensation normal.  Normal head impulse testing.  Normal hearing to finger rub. Face symmetric with  normal movements. Tongue and palate midline.  Normal shoulder elevation.      Motor: Normal bulk and tone.  No pronator drift.  Normal foot taps.  Full strength to confrontational testing except for the left foot which is in a boot.    Sensory: Normal light touch    Reflexes: Biceps, Brachioradialis, Triceps, Knees, Ankles 2/4.     Coordination: Normal finger to nose, rapid alternating movements    Gait: Left foot in brace/boot.     Adson's test for Thoracic Outlet Syndrome:  Arms abducted: Numbness and tingling develop in LEFT hand only  Arms abducted head turned to the RIGHT:   Right hand doesn't change   Left hand get worse  Arms abducted head turned to the LEFT:   Left hand gets better   Right hand starts to get symptoms    Pain under the RIGHT clavicle:  Pain at the RIGHT 1st rib-sternum insertion site:  Positive Pain under the LEFT clavicle:  Positive Pain at the LEFT 1st rib-sternum insertion site with radiation down the arm:     DATA:  All available and relevant labs, imaging, and other procedures were reviewed personally.   Last brain imaging:  MR BRAIN W/O CONTRAST 11/19/2019 2:42 PM     Provided History: Spell of altered cognition  ICD-10: Spell of altered cognition     Comparison:  Brain MRI 10/25/2017      Technique: Sagittal T1-weighted and axial T2-weighted, turboFLAIR and  diffusion-weighted with ADC map images of the brain were obtained  without intravenous contrast.     Findings: These images reveal no intracranial mass lesion, mass  effect, midline shift or abnormal extraaxial fluid collection. The  ventricles and sulci are normal for age. Possible nonspecific focus of  T2 hyperintensity in the right parietal white matter which is not out  of proportion to patient's age and is nonspecific. No abnormality of  reduced diffusion.  Normal intravascular flow voids.                                                                      Impression: No acute intracranial pathology; no acute  intracranial  hemorrhage or mass or acute infarct.     I have personally reviewed the examination and initial interpretation  and I agree with the findings.     VIRGINIE DAY MD    EXAM:  CT CHEST W CONTRAST . 11/20/2019 10:33 AM      TECHNIQUE:  Helical CT images from the thoracic inlet through the  upper abdomen were obtained with intravenous contrast. Contrast dose:  Isovue 370 77cc     COMPARISON: CT chest 4/27/2019     PROVIDED HISTORY: Thoracic outlet syndrome known or suspected; 24F  with L>R TOS symptoms, r/o cervical ribs, vein stenosis, chest mass.   other central vein compression.; Generalized edema     FINDINGS:  VASCULATURE: Heart size is within normal limits. Trace pericardial  fluid. Normal caliber of the thoracic aorta and main pulmonary artery.  Normal branching pattern of the great vessels off the aortic arch.  Left subclavian vein is unremarkable. No significant collaterals  filled by contrast bolus. Patency of the right subclavian vein is not  well evaluated secondary to contrast bolus timing. The right and left  subclavian arteries are unremarkable. No focal mass or adenopathy in  the supraclavicular tissues or axillae.      LUNGS: The central tracheobronchial tree is patent. No pleural  effusion or pneumothorax. No focal consolidation. No suspicious  pulmonary nodules.     MEDIASTINUM: No enlarged mediastinal lymph nodes. Visualized thyroid  is unremarkable.     UPPER ABDOMEN: Generalized hypoattenuation of the hepatic parenchyma  suggesting hepatic steatosis.     BONES: No acute osseous abnormality. No cervical ribs.                                                                      IMPRESSION:   1. No focal mass or adenopathy in the supraclavicular tissues or  axillae. No cervical ribs.  2. No definite abnormality of the subclavian veins, however please  note that the right subclavian vein is not well evaluated secondary to  contrast bolus timing. If symptoms persist, consider  follow-up with US  with dynamic positioning of the arms (search EPIC for US thoracic  outlet).        I have personally reviewed the examination and initial interpretation  and I agree with the findings.     LUIS GARDUNO MD    Arterial and venous ultrasound Doppler assessment of the left upper  extremity dated 12/6/2019 6:58 PM     Clinical information: Thoracic outlet syndrome assessment.     Ordering provider: Dr. Bradshaw     Technique: Ultrasound Doppler assessment of the subclavian  veins and  arteries of the upper extremities was performed in the neutral  position, at 90 degrees abduction, at 135 degrees abduction and at 180  degrees abduction.     Findings:     Right upper extremity:     Innominate vein: 85 cm/sec. No thrombus.     Peak systolic velocities were measured as follows:     Left Upper Extremity:  Innominate: 82 cm/sec  Subclavian medial: 100 cm/sec  Subclavian mid: 44 cm/sec  Subclavian distal: 59 cm/sec  Axillary: 58 cm/sec     Velocities obtained with patient sitting erect:     Subclavian mid at 0 degrees: 30 cm/sec  Subclavian mid at 90 degrees: 43 cm/sec  Subclavian mid at 135 degrees: 47 cm/sec  Subclavian mid at 180 degrees: 46 cm/sec     Phasic waveforms throughout.     PPG waveforms demonstrate flattening of the waveforms in the 180  degrees position, otherwise unremarkable.                                                                   Impression: Flattening of the PPG waveforms in 180 degree position  suggests some degree of positional narrowing of the outflow veins.  However, no change in velocity within the left mid subclavian vein  with abduction was present.     I have personally reviewed the examination and initial interpretation  and I agree with the findings.     THOMAS TORRES MD    Procedure Date: 11/19/2019      EEG #:  .      TYPE OF STUDY:  Routine outpatient EEG.      HISTORY:  Routine outpatient EEG performed on Samara Oropzea, a 24-year-old being evaluated for  spells of altered mental status with amnesia and collapse.  She also suffers from migraine headaches.  She is being treated with gabapentin.      FINDINGS:  During quiet wakefulness, desynchronized low amplitude background.  Mu is seen individually over the 2 central regions with an amplitude of 20 microvolts or so in bipolar montages.  Photic stimulation is performed and does not induce any abnormalities.  Hyperventilation is performed and is associated with occasional brief runs of a 10 Hz posterior dominant rhythm that appears symmetrical.  Posterior dominant rhythm never lasts long enough to assess reactivity.  As the study progresses, N1 sleep appears with slow eye movements.  There are some periods of N2 sleep with vertex waves, typically in the range of 30 microvolts in amplitude, sometimes serial vertex waves.  Sleep spindles are seen briefly.      OTHER INTERICTAL ABNORMALITIES:  No epileptiform discharges.      ICTAL ABNORMALITIES:  No electrographic seizures.      IMPRESSION:  Normal in wakefulness and sleep.  No epileptiform activity or seizures.         MATTHEW MORA MD      Recent Labs   Lab Test 12/31/19  1226   WBC 4.9   RBC 4.35   HGB 11.9   HCT 37.1   MCV 85   MCH 27.4   MCHC 32.1   RDW 14.8          Recent Labs   Lab Test 12/31/19  1226 12/11/19  1728  03/21/19  1940   NA  --  138   < >  --    POTASSIUM  --  3.5   < >  --    CHLORIDE  --  105   < >  --    SHANKAR  --  8.5   < >  --    CO2  --  26   < >  --    BUN 9 12   < > 7   CR  --  0.63   < > 0.68   GLC  --  62*   < >  --    TSH  --   --   --  0.53    < > = values in this interval not displayed.       IMPRESSIONS:  Samara Oropeza is a 24 year old female with PMH significant of severe intractable headaches with head pressure, presyncope, syncope, tinnitus, bilateral L>R arm paresthesias and pain.    Clinical suspicion for venous TOS is strong, especially given her risk factor of having an inflammatory disorder and proclivity to  having blood clots.  The imaging studies to date are not impressive, however.  We will presumptively treat for TOS and obtain further guidance with a referral to IR moving on to a venogram.     Recommendations:  1. Physical therapy for TOS  2. Lasix 40mg BID  3. IR consultation for potential venogram  4. F/U 3 months or as needed    30-minutes spent in evaluation, examination, and counseling

## 2020-01-22 NOTE — LETTER
1/22/2020       RE: Samara Oropeza  [address]  [address]  [place] [address]     Dear Colleague,    Thank you for referring your patient, Samara Oropeza, to the [facility] NEUROLOGY at [facility]. Please see a copy of my visit note below.        [facility]    Neurology Consult    1/22/2020      Samara Oropeza MRN# [id number]   YOB: 1995 Age: 24 year old      Primary care provider:   Sonja Abreu    Requesting provider:   Follow-up    Reason for Consult:  Headaches and thoracic outlet syndrome      HISTORY OF PRESENT ILLNESS:  Samara Oropeza is a 24 year old female with a past medical history of migraine headaches, bilateral arm tingling, hand swelling, and unexplained spells who returns for follow-up after our 11-5-2019 consultation.  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  She developed a blood clot in the left basilic vein, the side through which the contrast was injected.  She was put on Xarelto.  Since our visit, she has had headache for 15-20 days per month.  Her TOS ultrasound on 12-6-2019 showed flattening of the arterial waveforms with arm abduction.  The venous test was normal.  Her Chest CT did not reveal any structural cause for TOS.     Headache history: She had her last Botox injection with Dr. Hernandez in [facility] on 1-6-2020.  In the past she has responded very well to Botox but her last session had been less effective for unknown reasons.  Her last headache free day was 4 days ago.  She woke up on Monday with a migraine headache and has had the headache since then.  The headache has been at the base of the skull radiating to the temples.  There is some nausea.  The headache doesn't change much when she lays down.  She does wake up in the middle of the night with a headaches which she treats with an  ice pack.  She may take an Excedrin Migraine, which normally does work except for the severe headaches.  She also notes that when she pushes on the upper part of her left breast where there is a small nodule, she can get radiating pain into the head.     She has episodes of high pitched tinnitus along with pressure in the back of her headache with shortness of breath.  She will feel hot all over her body but especially her head.  She feels that her hands are swollen all the time.  She also feels like she will pass out when the episodes happen.  These episodes are much worse when she exercises, including doing weight.  Her main preventative regimen is amitriptyline, gabapentin, lasix, and Botox.    She is taking Lasix 40mg once a day.  She takes it in the morning-to noon.  She is tolerating it.  She does think that it has helped the swelling in her hands and legs but her headaches are still severe.     Arm pain: The pain and swelling has gone down quite a bit. She thinks that radiology should be contacting her to follow-up on the clots.      PAST MEDICAL HISTORY:  Past Medical History:   Diagnosis Date     Anemia      Anxiety      Arthritis      Behcet's disease (H)      Cervical adenitis May 2010     Chronic abdominal pain      Constipation, chronic 1994     Fibromyalgia      Gastro-oesophageal reflux disease      Gastroparesis      Irregular heart beat     tachycardia, has had workup     Migraines      Neuromuscular disorder (H)     fibramyalgia     Palpitations      PONV (postoperative nausea and vomiting)      Seizure (H)      Seizures (H)     unknown etiology     Syncope      Tourette's         PAST SURGICAL HISTORY:  Past Surgical History:   Procedure Laterality Date     ARTHROSCOPY ANKLE, OPEN REPAIR LIGAMENT, COMBINED Left 9/25/2019    Procedure: LEFT ANKLE ARTHROSCOPY WITH LIGAMENT REPAIR;  Surgeon: Andres Johnson DPM;  Location:  OR     ARTHROSCOPY ANKLE, REPAIR LIGAMENT Left 1/2/2019    Procedure:  Ankle arthroscopy and sinus tarsi evacuation, ligament repair, left lower extremity;  Surgeon: Andres Johnson DPM;  Location: [facility] OR     ARTHROSCOPY KNEE WITH PATELLAR REALIGNMENT  7/25/2013    Procedure: ARTHROSCOPY KNEE WITH PATELLAR REALIGNMENT;  Left Knee Arthroscopy, Medial Patellofemoral Ligament Reconstruction with Allograft  ;  Surgeon: Jennifer Acevedo MD;  Location: [facility] OR     COLONOSCOPY  2015     DENTAL SURGERY  1996    Teeth removal     ENDOSCOPY UPPER, COLONOSCOPY, COMBINED  2005     HC ESOPH/GAS REFLUX TEST W NASAL IMPED >1 HR N/A 2/15/2017    Procedure: ESOPHAGEAL IMPEDENCE FUNCTION TEST WITH 24 HOUR PH GREATER THAN 1 HOUR;  Surgeon: Timothy Matta MD;  Location: [facility] GI     IR PICC PLACEMENT > 5 YRS OF AGE  3/13/2019     IRRIGATION AND DEBRIDEMENT FOOT, COMBINED Left 3/12/2019    Procedure: COMBINED IRRIGATION AND DEBRIDEMENT LEFT ANKLE;  Surgeon: Micha Glover MD;  Location: [facility] OR     IRRIGATION AND DEBRIDEMENT LOWER EXTREMITY, COMBINED Left 5/7/2019    Procedure: 1.  Excision of wound down to and including deep fascia, less than 20 cm2.  2.  Irrigation and debridement, left ankle.;  Surgeon: Andres Johnson DPM;  Location: [facility] OR     PICC INSERTION Left 05/05/2019    4Fr - 45cm (5cm external), Basilic vein, SVC RA junction        SOCIAL HISTORY:  Has not been back to work since her blood clot since her work involves a lot of lifting.     ALLERGIES:     Allergies   Allergen Reactions     Amoxil [Penicillins] Rash     Dad unsure of reaction.     Bee Venom Anaphylaxis     Bioflavonoids Anaphylaxis     Contrast Dye Rash     Contrast Media Ready-Box [facility], 04/09/2014.; Contrast Media Ready-Box [facility], 04/09/2014.  NOTE: this is a contrast media oral with iodine. Premedicate with methylpred standard for IV contrast, request barium contrast for oral contrast.     Diagnostic X-Ray Materials Hives and Rash     Contrast Media Ready-Box [facility], 04/09/2014.; Contrast Media Ready-Box  [facility], 04/09/2014.  NOTE: this is a contrast media oral with iodine. Premedicate with methylpred standard for IV contrast, request barium contrast for oral contrast.     Orange Fruit [Citrus] Anaphylaxis     Pineapple Anaphylaxis, Difficulty breathing and Rash     Reglan [Metoclopramide] Other (See Comments)     IV dose only, in ER, rapid heart rate.     Ace Inhibitors      Difficulty in breathing and GI upset     Amitiza [Lubiprostone] Nausea and Vomiting     Amoxicillin-Pot Clavulanate      Midazolam Unknown     parent states that when pt takes this medication, she wakes up being very violent .     No Clinical Screening - See Comments      Bleech/ chest tightness, itchy throat, swollen tongue, hives     Tizanidine Other (See Comments)     Confusion, back pain, photophobia, abdominal pain, shaking, anxious       Versed      Coming out of pelvic exam at age of 5, was kicking and screaming when coming out of the versed.     Adhesive Tape Rash     Azithromycin Hives and Rash     Cephalexin Itching and Rash        MEDICATIONS:    Current Outpatient Medications:      albuterol (PROAIR HFA/PROVENTIL HFA/VENTOLIN HFA) 108 (90 BASE) MCG/ACT Inhaler, Inhale 2 puffs into the lungs every 6 hours as needed for shortness of breath / dyspnea or wheezing, Disp: 1 Inhaler, Rfl: 1     amitriptyline (ELAVIL) 25 MG tablet, Take 2 tablets (50 mg) by mouth At Bedtime, Disp: 180 tablet, Rfl: 1     Apremilast (OTEZLA) 10 & 20 & 30 MG TBPK, Follow package directions, Disp: , Rfl:      artificial tears OINT ophthalmic ointment, 0.5 inch strip each eye at night, Disp: 1 Tube, Rfl: 11     benzocaine (TOPICALE XTRA) 20 % GEL, Apply as needed locally to mouth or nasal ulcers for pain; 4 times daily as needed, Disp: 30 g, Rfl: 1     betamethasone valerate (VALISONE) 0.1 % cream, Apply topically 2 times daily as needed , Disp: , Rfl:      bisacodyl (DULCOLAX) 5 MG EC tablet, Take 2 tablets (10 mg) by mouth daily as needed for constipation, Disp:  30 tablet, Rfl: 0     citalopram (CELEXA) 20 MG tablet, Take 1 tablet (20 mg) by mouth daily, Disp: 90 tablet, Rfl: 1     clobetasol (TEMOVATE) 0.05 % cream, Apply topically 2 times daily, Disp: 60 g, Rfl: 0     colchicine (COLCYRS) 0.6 MG tablet, TAKE 1 TABLET (0.6 MG) BY MOUTH 2 TIMES DAILY, Disp: 180 tablet, Rfl: 1     cyproheptadine (PERIACTIN) 4 MG tablet, Take 2 tablets (8 mg) by mouth At Bedtime For nightmares, Disp: 180 tablet, Rfl: 2     dicyclomine (BENTYL) 20 MG tablet, Take 1 tablet (20 mg) by mouth 4 times daily as needed, Disp: 120 tablet, Rfl: 3     diphenhydrAMINE (BENADRYL) 25 MG tablet, Take 1 tablet (25 mg) by mouth 3 times daily as needed (itching), Disp: 40 tablet, Rfl: 1     EPINEPHrine (EPIPEN 2-EAMON) 0.3 MG/0.3ML injection, Inject 0.3 mLs (0.3 mg) into the muscle as needed for anaphylaxis, Disp: 0.6 mL, Rfl: 3     furosemide (LASIX) 20 MG tablet, Take 1 tablet (20 mg) by mouth daily, Disp: 60 tablet, Rfl: 3     gabapentin (NEURONTIN) 300 MG capsule, Take 1 capsule (300 mg) by mouth 3 times daily, Disp: 90 capsule, Rfl: 1     guanFACINE (TENEX) 1 MG tablet, TAKE 3 TABLETS (3 MG) BY MOUTH TWICE DAILY IN THE MORNING AND EVENING., Disp: 180 tablet, Rfl: 5     HYDROcodone-acetaminophen (NORCO) 5-325 MG tablet, Take 1-2 tablets by mouth, Disp: , Rfl:      hydrocortisone 1 % CREA cream, Place rectally 2 times daily as needed for itching, Disp: 28.35 g, Rfl: 1     hydrOXYzine (ATARAX) 25 MG tablet, Take 1-2 tablets every 4-6 hours as needed for pain control., Disp: 30 tablet, Rfl: 0     hyoscyamine (ANASPAZ/LEVSIN) 0.125 MG tablet, Take 1-2 tablets (125-250 mcg) by mouth every 4 hours as needed for cramping, Disp: 40 tablet, Rfl: 1     hypromellose (GENTEAL) 0.3 % SOLN, 1 drop every hour as needed for dry eyes , Disp: , Rfl:      Lactase 9000 units CHEW, , Disp: , Rfl:      lactulose 20 GM/30ML SOLN, Take 30 mLs by mouth 3 times daily as needed (for constipation), Disp: 300 mL, Rfl: 3     lidocaine  (LMX4) 4 % external cream, Apply topically once as needed for mild pain, Disp: 120 g, Rfl: 1     lidocaine (LMX4) 4 % external cream, Apply 1 Application topically once as needed for mild pain, Disp: , Rfl:      LINZESS 290 MCG capsule, TAKE 1 CAPSULE BY MOUTH EVERY MORNING BEFORE BREAKFAST, Disp: 90 capsule, Rfl: 0     LORazepam (ATIVAN) 0.5 MG tablet, Take 1 tablet (0.5 mg) by mouth every 6 hours as needed for anxiety, Disp: 10 tablet, Rfl: 0     ondansetron (ZOFRAN) 8 MG tablet, , Disp: , Rfl:      polyethylene glycol (MIRALAX/GLYCOLAX) powder, Take 1 capful by mouth 3 times daily, Disp: , Rfl:      promethazine (PHENERGAN) 12.5 MG tablet, Take 1-2 tablets (12.5-25 mg) by mouth every 6 hours as needed for nausea, Disp: 30 tablet, Rfl: 3     rivaroxaban ANTICOAGULANT (XARELTO) 20 MG TABS tablet, Take 1 tablet (20 mg) by mouth daily (with dinner), Disp: 90 tablet, Rfl: 0     sucralfate (CARAFATE) 1 GM/10ML suspension, Take 10 mLs (1 g) by mouth 4 times daily, Disp: 1200 mL, Rfl: 2     tiZANidine (ZANAFLEX) 4 MG tablet, , Disp: , Rfl:      triamcinolone (KENALOG) 0.5 % external ointment, Apply 1 g topically 3 times daily as needed for irritation, Disp: 15 g, Rfl: 3     REVIEW OF SYSTEM:  Skin: Blisters on hand  Eyes: negative  Ears/Nose/Throat: negative  Respiratory: Positive shortness of breath  Cardiovascular: negative  Gastrointestinal: negative  Genitourinary: negative  Musculoskeletal: negative  Neurologic: headache  Psychiatric: negative  Hematologic/Lymphatic/Immunologic: blood clot in left arm being treated  Endocrine: negative    PHYSICAL EXAM:  Vitals: /82 (BP Location: Left arm, Patient Position: Sitting, Cuff Size: Adult Large)   Pulse 130   SpO2 98%     General: Patient is well-nourished, well-groomed, in no apparent distress but she is quiet and resigned.     HEENT: Head is atraumatic, eyes look normal exteriorly, throat clear, neck supple.    Neurologic:  Mental Status: Alert and oriented to  person, place, time, and situation.  Able to provide and excellent history.     Cranial Nerves: Visual fields full to confrontation.   Extraocular movements full.  Face sensation normal.  Normal head impulse testing.  Normal hearing to finger rub. Face symmetric with normal movements. Tongue and palate midline.  Normal shoulder elevation.      Motor: Normal bulk and tone.  No pronator drift.  Normal foot taps.  Full strength to confrontational testing except for the left foot which is in a boot.    Sensory: Normal light touch    Reflexes: Biceps, Brachioradialis, Triceps, Knees, Ankles 2/4.     Coordination: Normal finger to nose, rapid alternating movements    Gait: Left foot in brace/boot.     Adson's test for Thoracic Outlet Syndrome:  Arms abducted: Numbness and tingling develop in LEFT hand only  Arms abducted head turned to the RIGHT:   Right hand doesn't change   Left hand get worse  Arms abducted head turned to the LEFT:   Left hand gets better   Right hand starts to get symptoms    Pain under the RIGHT clavicle:  Pain at the RIGHT 1st rib-sternum insertion site:  Positive Pain under the LEFT clavicle:  Positive Pain at the LEFT 1st rib-sternum insertion site with radiation down the arm:     DATA:  All available and relevant labs, imaging, and other procedures were reviewed personally.   Last brain imaging:  MR BRAIN W/O CONTRAST 11/19/2019 2:42 PM     Provided History: Spell of altered cognition  ICD-10: Spell of altered cognition     Comparison:  Brain MRI 10/25/2017      Technique: Sagittal T1-weighted and axial T2-weighted, turboFLAIR and  diffusion-weighted with ADC map images of the brain were obtained  without intravenous contrast.     Findings: These images reveal no intracranial mass lesion, mass  effect, midline shift or abnormal extraaxial fluid collection. The  ventricles and sulci are normal for age. Possible nonspecific focus of  T2 hyperintensity in the right parietal white matter which is not  out  of proportion to patient's age and is nonspecific. No abnormality of  reduced diffusion.  Normal intravascular flow voids.                                                                      Impression: No acute intracranial pathology; no acute intracranial  hemorrhage or mass or acute infarct.     I have personally reviewed the examination and initial interpretation  and I agree with the findings.     VIRGINIE DAY MD    EXAM:  CT CHEST W CONTRAST . 11/20/2019 10:33 AM      TECHNIQUE:  Helical CT images from the thoracic inlet through the  upper abdomen were obtained with intravenous contrast. Contrast dose:  Isovue 370 77cc     COMPARISON: CT chest 4/27/2019     PROVIDED HISTORY: Thoracic outlet syndrome known or suspected; 24F  with L>R TOS symptoms, r/o cervical ribs, vein stenosis, chest mass.   other central vein compression.; Generalized edema     FINDINGS:  VASCULATURE: Heart size is within normal limits. Trace pericardial  fluid. Normal caliber of the thoracic aorta and main pulmonary artery.  Normal branching pattern of the great vessels off the aortic arch.  Left subclavian vein is unremarkable. No significant collaterals  filled by contrast bolus. Patency of the right subclavian vein is not  well evaluated secondary to contrast bolus timing. The right and left  subclavian arteries are unremarkable. No focal mass or adenopathy in  the supraclavicular tissues or axillae.      LUNGS: The central tracheobronchial tree is patent. No pleural  effusion or pneumothorax. No focal consolidation. No suspicious  pulmonary nodules.     MEDIASTINUM: No enlarged mediastinal lymph nodes. Visualized thyroid  is unremarkable.     UPPER ABDOMEN: Generalized hypoattenuation of the hepatic parenchyma  suggesting hepatic steatosis.     BONES: No acute osseous abnormality. No cervical ribs.                                                                      IMPRESSION:   1. No focal mass or adenopathy in the  supraclavicular tissues or  axillae. No cervical ribs.  2. No definite abnormality of the subclavian veins, however please  note that the right subclavian vein is not well evaluated secondary to  contrast bolus timing. If symptoms persist, consider follow-up with US  with dynamic positioning of the arms (search EPIC for US thoracic  outlet).        I have personally reviewed the examination and initial interpretation  and I agree with the findings.     LUIS GARDUNO MD    Arterial and venous ultrasound Doppler assessment of the left upper  extremity dated 12/6/2019 6:58 PM     Clinical information: Thoracic outlet syndrome assessment.     Ordering provider: Dr. Bradshaw     Technique: Ultrasound Doppler assessment of the subclavian  veins and  arteries of the upper extremities was performed in the neutral  position, at 90 degrees abduction, at 135 degrees abduction and at 180  degrees abduction.     Findings:     Right upper extremity:     Innominate vein: 85 cm/sec. No thrombus.     Peak systolic velocities were measured as follows:     Left Upper Extremity:  Innominate: 82 cm/sec  Subclavian medial: 100 cm/sec  Subclavian mid: 44 cm/sec  Subclavian distal: 59 cm/sec  Axillary: 58 cm/sec     Velocities obtained with patient sitting erect:     Subclavian mid at 0 degrees: 30 cm/sec  Subclavian mid at 90 degrees: 43 cm/sec  Subclavian mid at 135 degrees: 47 cm/sec  Subclavian mid at 180 degrees: 46 cm/sec     Phasic waveforms throughout.     PPG waveforms demonstrate flattening of the waveforms in the 180  degrees position, otherwise unremarkable.                                                                   Impression: Flattening of the PPG waveforms in 180 degree position  suggests some degree of positional narrowing of the outflow veins.  However, no change in velocity within the left mid subclavian vein  with abduction was present.     I have personally reviewed the examination and initial interpretation  and I  agree with the findings.     THOMAS TORRES MD    Procedure Date: 11/19/2019      EEG #:  .      TYPE OF STUDY:  Routine outpatient EEG.      HISTORY:  Routine outpatient EEG performed on Samara Oropeza, a 24-year-old being evaluated for spells of altered mental status with amnesia and collapse.  She also suffers from migraine headaches.  She is being treated with gabapentin.      FINDINGS:  During quiet wakefulness, desynchronized low amplitude background.  Mu is seen individually over the 2 central regions with an amplitude of 20 microvolts or so in bipolar montages.  Photic stimulation is performed and does not induce any abnormalities.  Hyperventilation is performed and is associated with occasional brief runs of a 10 Hz posterior dominant rhythm that appears symmetrical.  Posterior dominant rhythm never lasts long enough to assess reactivity.  As the study progresses, N1 sleep appears with slow eye movements.  There are some periods of N2 sleep with vertex waves, typically in the range of 30 microvolts in amplitude, sometimes serial vertex waves.  Sleep spindles are seen briefly.      OTHER INTERICTAL ABNORMALITIES:  No epileptiform discharges.      ICTAL ABNORMALITIES:  No electrographic seizures.      IMPRESSION:  Normal in wakefulness and sleep.  No epileptiform activity or seizures.         MATTHEW MORA MD      Recent Labs   Lab Test 12/31/19  1226   WBC 4.9   RBC 4.35   HGB 11.9   HCT 37.1   MCV 85   MCH 27.4   MCHC 32.1   RDW 14.8          Recent Labs   Lab Test 12/31/19  1226 12/11/19  1728  03/21/19  1940   NA  --  138   < >  --    POTASSIUM  --  3.5   < >  --    CHLORIDE  --  105   < >  --    SHANKAR  --  8.5   < >  --    CO2  --  26   < >  --    BUN 9 12   < > 7   CR  --  0.63   < > 0.68   GLC  --  62*   < >  --    TSH  --   --   --  0.53    < > = values in this interval not displayed.       IMPRESSIONS:  Samara Oropeza is a 24 year old female with PMH significant of severe  intractable headaches with head pressure, presyncope, syncope, tinnitus, bilateral L>R arm paresthesias and pain.    Clinical suspicion for venous TOS is strong, especially given her risk factor of having an inflammatory disorder and proclivity to having blood clots.  The imaging studies to date are not impressive, however.  We will presumptively treat for TOS and obtain further guidance with a referral to IR moving on to a venogram.     Recommendations:  1. Physical therapy for TOS  2. Lasix 40mg BID  3. IR consultation for potential venogram  4. F/U 3 months or as needed    30-minutes spent in evaluation, examination, and counseling      Darrius JAUREGUI Cha, MD

## 2020-01-23 DIAGNOSIS — F41.1 GAD (GENERALIZED ANXIETY DISORDER): ICD-10-CM

## 2020-01-23 RX ORDER — GABAPENTIN 300 MG/1
CAPSULE ORAL
Qty: 90 CAPSULE | Refills: 1 | Status: SHIPPED | OUTPATIENT
Start: 2020-01-23 | End: 2020-09-22

## 2020-01-23 NOTE — TELEPHONE ENCOUNTER
Controlled Substance Refill Request for GABAPENTIN 300 MG CAPSULE  Problem List Complete:    No     PROVIDER TO CONSIDER COMPLETION OF PROBLEM LIST AND OVERVIEW/CONTROLLED SUBSTANCE AGREEMENT    Last Written Prescription Date:  12/31/2019  Last Fill Quantity: 90,   # refills: 1    THE MOST RECENT OFFICE VISIT MUST BE WITHIN THE PAST 3 MONTHS. AT LEAST ONE FACE TO FACE VISIT MUST OCCUR EVERY 6 MONTHS. ADDITIONAL VISITS CAN BE VIRTUAL.  (THIS STATEMENT SHOULD BE DELETED.)    Last Office Visit with [facility] primary care provider: 12/31/2019    Future Office visit:   Next 5 appointments (look out 90 days)    Feb 05, 2020  1:30 PM CST  Return Visit with Ernestina Patel [facility]  [address]  [facility] [address]  [phone]   Feb 18, 2020 10:00 AM CST  Return Visit with Ernestina Patel [facility]  [address]  [facility] [address]  [phone]   Mar 03, 2020 10:00 AM CST  Return Visit with Ernestina Patel [facility]  [address]  [facility] [address]  [phone]          Controlled substance agreement:   Encounter-Level CSA:    There are no encounter-level csa.     Patient-Level CSA:    There are no patient-level csa.         Last Urine Drug Screen: No results found for: CDAUT, No results found for: COMDAT,   Cannabinoids (94-dut-7-carboxy-9-THC)   Date Value Ref Range Status   05/01/2018 Detected, Abnormal Result (A) NDET^Not Detected ng/mL Final     Comment:     Cutoff for a positive cannabinoid is greater than 50 ng/ml.  This is an unconfirmed screening result to be used for medical purposes only.   Order [id number] for confirmation or individual confirmation tests to [employer].       Phencyclidine (Phencyclidine)   Date Value Ref Range Status   05/01/2018 Not Detected  NDET^Not Detected ng/mL Final     Comment:     Cutoff for a negative PCP is 25 ng/mL or less.     Cocaine (Benzoylecgonine)   Date Value Ref Range Status   05/01/2018 Not Detected NDET^Not Detected ng/mL Final     Comment:     Cutoff for a negative cocaine is 150 ng/ml or less.     Methamphetamine (d-Methamphetamine)   Date Value Ref Range Status   05/01/2018 Not Detected NDET^Not Detected ng/mL Final     Comment:     Cutoff for a negative methamphetamine is 500 ng/ml or less.     Opiates (Morphine)   Date Value Ref Range Status   05/01/2018 Not Detected NDET^Not Detected ng/mL Final     Comment:     Cutoff for a negative opiate is 100 ng/ml or less.     Amphetamine (d-Amphetamine)   Date Value Ref Range Status   05/01/2018 Not Detected NDET^Not Detected ng/mL Final     Comment:     Cutoff for a negative amphetamine is 500 ng/mL or less.     Benzodiazepines (Nordiazepam)   Date Value Ref Range Status   05/01/2018 Not Detected NDET^Not Detected ng/mL Final     Comment:     Cutoff for a negative benzodiazepine is 150 ng/ml or less.     Tricyclic Antidepressants (Desipramine)   Date Value Ref Range Status   05/01/2018 Detected, Abnormal Result (A) NDET^Not Detected ng/mL Final     Comment:     Cutoff for a positive tricyclic antidepressant is greater than 300 ng/ml.  This is an unconfirmed screening result to be used for medical purposes only.   Order [id number] for confirmation or individual confirmation tests to [employer].       Methadone (Methadone)   Date Value Ref Range Status   05/01/2018 Not Detected NDET^Not Detected ng/mL Final     Comment:     Cutoff for a negative methadone is 200 ng/ml or less.     Barbiturates (Butalbital)   Date Value Ref Range Status   05/01/2018 Not Detected NDET^Not Detected ng/mL Final     Comment:     Cutoff for a negative barbituate is 200 ng/ml or less.     Oxycodone (Oxycodone)   Date Value Ref Range Status   05/01/2018 Not Detected NDET^Not Detected ng/mL Final     Comment:     Cutoff  for a negative Oxycodone is 100 ng/mL or less.     Propoxyphene (Norpropoxyphene)   Date Value Ref Range Status   05/01/2018 Not Detected NDET^Not Detected ng/mL Final     Comment:     Cutoff for a negative propoxyphene is 300 ng/ml or less     Buprenorphine (Buprenorphine)   Date Value Ref Range Status   05/01/2018 Not Detected NDET^Not Detected ng/mL Final     Comment:     Cutoff for a negative buprenorphine is 10 ng/ml or less        Processing:  Rx to be electronically transmitted to pharmacy by provider      [email or web address]       checked in past 3 months?  No, route to RN

## 2020-01-28 DIAGNOSIS — G54.0 THORACIC OUTLET SYNDROME: Primary | ICD-10-CM

## 2020-02-03 NOTE — TELEPHONE ENCOUNTER
[facility]   DATE RECEIVED: 2.17.20   NOTES STATUS DETAILS   OFFICE NOTE from referring provider Internal 1.22.20, 11.6.19 Dr. Darrius Bradshaw   OFFICE NOTE from other specialist Internal/CE 12.31.19, 12.5.19 EVI Cronin CNP  12.17.19 Dr. Ksenia Jurado  12.11.19 Dr. Miguel Steele  12.11.19, 12.1.19 Dr. Arian Villaseñor  12.9.19 Dr. Anthony Bernard   OPERATIVE REPORT N/A    MEDICATION LIST Internal    PERTINENT LABS Internal    CTA (CT ANGIOGRAPHY) N/A    CT Internal Chest- 12.11.19, 11.20.19, 4.27.19     MRI N/A    ULTRASOUND Internal 2.5.20, 12.6.19, 9.26.19, 12.9.19, 12.1.19     Action 2.3.20  2:21 pm   Action Taken Faxed imaging request to [facility] for US from 12.9.19 and 12.1.19) MARY JANE     Action 2.14.20  MARY JANE   Action Taken Called [facility] to push imaging. They pushed it immediately and it was uploaded to [facility].

## 2020-02-05 ENCOUNTER — TRANSFERRED RECORDS (OUTPATIENT)
Dept: HEALTH INFORMATION MANAGEMENT | Facility: CLINIC | Age: 25
End: 2020-02-05

## 2020-02-05 ENCOUNTER — MYC MEDICAL ADVICE (OUTPATIENT)
Dept: PSYCHOLOGY | Facility: CLINIC | Age: 25
End: 2020-02-05

## 2020-02-17 ENCOUNTER — PRE VISIT (OUTPATIENT)
Dept: VASCULAR SURGERY | Facility: CLINIC | Age: 25
End: 2020-02-17

## 2020-02-17 ENCOUNTER — OFFICE VISIT (OUTPATIENT)
Dept: VASCULAR SURGERY | Facility: CLINIC | Age: 25
End: 2020-02-17
Payer: COMMERCIAL

## 2020-02-17 VITALS — DIASTOLIC BLOOD PRESSURE: 84 MMHG | HEART RATE: 118 BPM | SYSTOLIC BLOOD PRESSURE: 124 MMHG | OXYGEN SATURATION: 100 %

## 2020-02-17 DIAGNOSIS — G54.0 THORACIC OUTLET SYNDROME: Primary | ICD-10-CM

## 2020-02-17 DIAGNOSIS — Z91.041 CONTRAST MEDIA ALLERGY: ICD-10-CM

## 2020-02-17 RX ORDER — METHYLPREDNISOLONE 32 MG/1
32 TABLET ORAL PRN
Qty: 2 TABLET | Refills: 1 | Status: SHIPPED | OUTPATIENT
Start: 2020-02-17 | End: 2020-02-17

## 2020-02-17 ASSESSMENT — PAIN SCALES - GENERAL: PAINLEVEL: SEVERE PAIN (6)

## 2020-02-17 NOTE — LETTER
2/17/2020       RE: Samara Oropeza  [address]  [address]  [place] [address]     Dear Colleague,    Thank you for referring your patient, Samara Oropeza, to the [facility] VASCULAR [facility]INIC at [facility]. Please see a copy of my visit note below.        INTERVENTIONAL RADIOLOGY CONSULTATION    Name: Samara Oropeza  Age: 24 year old   Referring Physician: Dr. Bradshaw   REASON FOR REFERRAL: Evaluation/diagnostic work-up for possible thoracic outlet syndrome    HPI: Ms. Oropeza is a very pleasant 24-year-old patient who was referred to me by Dr. Bradshaw from neurotology with a past medical history most significant for migraine headaches, bilateral arm tingling, and hand swelling (left greater than right).  She has an active lifestyle and does lift weight for bodybuilding.  Her symptoms have been going on for a number of years.  She has had multiple clinical encounters to figure out the reason.  Dr. Bradshaw has started the preliminary work-up for possibility of thoracic outlet syndrome.  The chest CT does not reveal any structural cause for this entity.  Her ultrasound reveals flattening of arterial waveforms with arm abduction, however venous flow continues in the dynamic range of motion.    PAST MEDICAL HISTORY:   Past Medical History:   Diagnosis Date     Anemia      Anxiety      Arthritis      Behcet's disease (H)      Cervical adenitis May 2010     Chronic abdominal pain      Constipation, chronic 1994     Fibromyalgia      Gastro-oesophageal reflux disease      Gastroparesis      Irregular heart beat     tachycardia, has had workup     Migraines      Neuromuscular disorder (H)     fibramyalgia     Palpitations      PONV (postoperative nausea and vomiting)      Seizure (H)      Seizures (H)     unknown etiology     Syncope      Tourette's        PAST SURGICAL HISTORY:   Past Surgical History:   Procedure Laterality Date     ARTHROSCOPY ANKLE, OPEN REPAIR LIGAMENT, COMBINED Left  9/25/2019    Procedure: LEFT ANKLE ARTHROSCOPY WITH LIGAMENT REPAIR;  Surgeon: Andres Johnson DPM;  Location: [facility] OR     ARTHROSCOPY ANKLE, REPAIR LIGAMENT Left 1/2/2019    Procedure: Ankle arthroscopy and sinus tarsi evacuation, ligament repair, left lower extremity;  Surgeon: Andres Johnson DPM;  Location: [facility] OR     ARTHROSCOPY KNEE WITH PATELLAR REALIGNMENT  7/25/2013    Procedure: ARTHROSCOPY KNEE WITH PATELLAR REALIGNMENT;  Left Knee Arthroscopy, Medial Patellofemoral Ligament Reconstruction with Allograft  ;  Surgeon: Jeninfer Acevedo MD;  Location: [facility] OR     COLONOSCOPY  2015     DENTAL SURGERY  1996    Teeth removal     ENDOSCOPY UPPER, COLONOSCOPY, COMBINED  2005     HC ESOPH/GAS REFLUX TEST W NASAL IMPED >1 HR N/A 2/15/2017    Procedure: ESOPHAGEAL IMPEDENCE FUNCTION TEST WITH 24 HOUR PH GREATER THAN 1 HOUR;  Surgeon: Timothy Matta MD;  Location: [facility]     IR PICC PLACEMENT > 5 YRS OF AGE  3/13/2019     IRRIGATION AND DEBRIDEMENT FOOT, COMBINED Left 3/12/2019    Procedure: COMBINED IRRIGATION AND DEBRIDEMENT LEFT ANKLE;  Surgeon: Micha Glover MD;  Location: [facility]     IRRIGATION AND DEBRIDEMENT LOWER EXTREMITY, COMBINED Left 5/7/2019    Procedure: 1.  Excision of wound down to and including deep fascia, less than 20 cm2.  2.  Irrigation and debridement, left ankle.;  Surgeon: Andres Johnson DPM;  Location: [facility] OR     PICC INSERTION Left 05/05/2019    4Fr - 45cm (5cm external), Basilic vein, SVC RA junction       FAMILY HISTORY:   Family History   Problem Relation Age of Onset     Depression Mother      Neurologic Disorder Mother         Migraines, take imitrex injection.  Also in maternal grandmother.       Alcohol/Drug Father      Hypertension Father      Depression Father      Osteoarthritis Father      Cardiovascular Maternal Grandmother      Depression Maternal Grandmother      Hypertension Maternal Grandmother      Alzheimer Disease Maternal Grandmother       Cardiovascular Maternal Grandfather      Hypertension Maternal Grandfather      Depression Maternal Grandfather      Alcohol/Drug Maternal Grandfather      Diabetes Maternal Grandfather      Cardiovascular Paternal Grandmother      Hypertension Paternal Grandmother      Cardiovascular Paternal Grandfather      Hypertension Paternal Grandfather      Glaucoma No family hx of      Macular Degeneration No family hx of        SOCIAL HISTORY:   Social History     Tobacco Use     Smoking status: Never Smoker     Smokeless tobacco: Never Used   Substance Use Topics     Alcohol use: Not Currently     Alcohol/week: 1.0 standard drinks     Types: 1 Standard drinks or equivalent per week     Comment: rarely       PROBLEM LIST:   Patient Active Problem List    Diagnosis Date Noted     Infection of joint of ankle (H) 05/06/2019     Priority: Medium     Cellulitis 03/09/2019     Priority: Medium     Displacement of lumbar intervertebral disc without myelopathy 11/13/2018     Priority: Medium     Overview:   Created by Conversion       Iron deficiency associated with nonfamilial restless legs syndrome 11/13/2018     Priority: Medium     Enthesopathy of hip region 11/13/2018     Priority: Medium     Overview:   Created by Conversion       Tourette's syndrome 11/13/2018     Priority: Medium     Overview:   Created by Conversion       Somatic symptom disorder 06/01/2018     Priority: Medium     Pelvic floor weakness 04/25/2018     Priority: Medium     Spells of decreased attentiveness 12/19/2017     Priority: Medium     Mobile cecum 11/09/2017     Priority: Medium     Cecum noted in Right lower quadrant on 4/17 CT scan, and in Left upper Quadrant on CT on 11/2017.       Vitamin D deficiency 10/11/2017     Priority: Medium     How low, unknown/not found. On D when tested at 27. Starting cholecalciferol October 2017. Needs recheck.       Convulsions, unspecified convulsion type (H) 10/03/2017     Priority: Medium     Transient  alteration of awareness 10/03/2017     Priority: Medium     Chronic pain syndrome 07/27/2017     Priority: Medium     Major depressive disorder, recurrent episode, moderate (H) 06/27/2017     Priority: Medium     Cervical pain 05/02/2017     Priority: Medium     Acute left ankle pain 03/31/2017     Priority: Medium     Cervical dystonia 03/28/2017     Priority: Medium     PTSD (post-traumatic stress disorder) 01/17/2017     Priority: Medium     Patellofemoral instability 10/20/2016     Priority: Medium     Fibromyalgia 08/04/2016     Priority: Medium     Rheumatoid arthritis of multiple sites without rheumatoid factor (H) 08/04/2016     Priority: Medium     Raynaud's disease without gangrene 08/04/2016     Priority: Medium     Chronic abdominal pain 08/04/2016     Priority: Medium     Palpitations 01/12/2016     Priority: Medium     On colchicine therapy 10/30/2015     Priority: Medium     Spell of shaking 05/06/2015     Priority: Medium     Migraine 02/04/2015     Priority: Medium     Behcet's disease (H) 12/10/2014     Priority: Medium     Headaches due to old head injury 11/12/2013     Priority: Medium     Milk protein intolerance 10/11/2013     Priority: Medium     Intestinal malabsorption 10/11/2013     Priority: Medium     Concussion 02/13/2013     Priority: Medium     Jan 2013, with prolonged recovery- followed by sports med         Knee pain 01/03/2013     Priority: Medium     Generalized anxiety disorder 06/25/2009     Priority: Medium     Tics - Tourette syndrome 05/18/2009     Priority: Medium     Followed by psychotherapy. Symptoms well managed. [facility] ([facility] Simpson)           IBS (irritable bowel syndrome) 05/18/2009     Priority: Medium     Allergic rhinitis 05/18/2009     Priority: Medium     GERD (gastroesophageal reflux disease) 01/10/2008     Priority: Medium     Gastroparesis 1994     Priority: Medium       MEDICATIONS:   Prescription Medications as of  2020       Rx Number Disp Refills Start End Last Dispensed Date Next Fill Date Owning Pharmacy    albuterol (PROAIR HFA/PROVENTIL HFA/VENTOLIN HFA) 108 (90 BASE) MCG/ACT Inhaler  1 Inhaler 1 2017    [address]    Sig: Inhale 2 puffs into the lungs every 6 hours as needed for shortness of breath / dyspnea or wheezing    Class: E-Prescribe    Route: Inhalation    amitriptyline (ELAVIL) 25 MG tablet  180 tablet 1 2019    [facility] [address] [place] [address] [facility]    Sig: Take 2 tablets (50 mg) by mouth At Bedtime    Class: E-Prescribe    Route: Oral    Apremilast (OTEZLA) 10 & 20 & 30 MG TBPK    2019        Sig: Follow package directions    Class: Historical    artificial tears OINT ophthalmic ointment  1 Tube 11 5/3/2018    [address]    Si.5 inch strip each eye at night    Class: E-Prescribe    benzocaine (TOPICALE XTRA) 20 % GEL  30 g 1 3/21/2019    [address]    Sig: Apply as needed locally to mouth or nasal ulcers for pain; 4 times daily as needed    Class: E-Prescribe    betamethasone valerate (VALISONE) 0.1 % cream            Sig: Apply topically 2 times daily as needed     Class: Historical    Route: Topical    bisacodyl (DULCOLAX) 5 MG EC tablet  30 tablet 0 2019    [place], [place] - [address] [place] [address]    Sig: Take 2 tablets (10 mg) by mouth daily as needed for constipation    Class: E-Prescribe    Route: Oral    citalopram (CELEXA) 20 MG tablet  90 tablet 1 2019    [facility] [address] [place] [address] [facility]    Sig: Take 1 tablet (20 mg) by mouth daily    Class: E-Prescribe    Route: Oral    clindamycin (CLEOCIN) 300 MG capsule   0 2019        Sig: TAKE 1 CAPSULE BY MOUTH THREE TIMES A DAY FOR 10 DAYS    Class: Historical    clobetasol (TEMOVATE) 0.05 % cream  60 g 0 10/31/2016     [address]    Sig: Apply topically 2 times daily    Class: E-Prescribe    Route: Topical    colchicine (COLCYRS) 0.6 MG tablet  180 tablet 1 11/4/2019    [facility] [address] [place] [address]    Sig: TAKE 1 TABLET (0.6 MG) BY MOUTH 2 TIMES DAILY    Class: E-Prescribe    Notes to Pharmacy: REQUESTING REFILL ON COLCHICINE 0.6 MG TAB, CURRENT RX IS OUT OF REFILLS. PLEASE SEND NEW RX. THANKS!    Route: Oral    cyproheptadine (PERIACTIN) 4 MG tablet  180 tablet 2 6/11/2019    [facility] [address] [place] [facility] - [place], [place] - [address]    Sig: Take 2 tablets (8 mg) by mouth At Bedtime For nightmares    Class: E-Prescribe    Route: Oral    dicyclomine (BENTYL) 20 MG tablet  120 tablet 3 4/8/2019    [address]    Sig: Take 1 tablet (20 mg) by mouth 4 times daily as needed    Class: E-Prescribe    Route: Oral    diphenhydrAMINE (BENADRYL) 25 MG tablet  40 tablet 1 3/15/2019    [address]    Sig: Take 1 tablet (25 mg) by mouth 3 times daily as needed (itching)    Class: E-Prescribe    Route: Oral    EPINEPHrine (EPIPEN 2-EAMON) 0.3 MG/0.3ML injection  0.6 mL 3 4/19/2017    [address]    Sig: Inject 0.3 mLs (0.3 mg) into the muscle as needed for anaphylaxis    Class: E-Prescribe    Route: Intramuscular    fluconazole (DIFLUCAN) 150 MG tablet    6/3/2019        Class: Historical    furosemide (LASIX) 20 MG tablet  60 tablet 3 11/6/2019    [facility] [address] [place] [address]    Sig: Take 1 tablet (20 mg) by mouth daily    Class: E-Prescribe    Route: Oral    furosemide (LASIX) 40 MG tablet  60 tablet 3 1/22/2020    [facility] [address] [place] [address]    Sig: Take 1 tablet (40 mg) by mouth 2 times daily    Class: E-Prescribe    Route: Oral    gabapentin (NEURONTIN) 300 MG capsule  90 capsule 1  2020    [facility] [address] [place] [address]    Sig: TAKE 1 CAPSULE BY MOUTH THREE TIMES A DAY    Class: E-Prescribe    guanFACINE (TENEX) 1 MG tablet  180 tablet 5 2019    [facility] [address] [place] [address]    Sig: TAKE 3 TABLETS (3 MG) BY MOUTH TWICE DAILY IN THE MORNING AND EVENING.    Class: E-Prescribe    HYDROcodone-acetaminophen (NORCO) 5-325 MG tablet    2019        Sig: Take 1-2 tablets by mouth    Class: Historical    Earliest Fill Date: 2019    Route: Oral    hydrocortisone 1 % CREA cream  28.35 g 1 2018    [address]    Sig: Place rectally 2 times daily as needed for itching    Class: E-Prescribe    Route: Rectal    hydrOXYzine (ATARAX) 25 MG tablet  30 tablet 0 2019    [place] [address]    Sig: Take 1-2 tablets every 4-6 hours as needed for pain control.    Class: Local Print    hyoscyamine (ANASPAZ/LEVSIN) 0.125 MG tablet  40 tablet 1 2019    [facility] [address] [place] [facility] - [place], [place] - [address]    Sig: Take 1-2 tablets (125-250 mcg) by mouth every 4 hours as needed for cramping    Class: E-Prescribe    Route: Oral    hypromellose (GENTEAL) 0.3 % SOLN            Si drop every hour as needed for dry eyes     Class: Historical    Lactase 9000 units CHEW    4/10/2008        Class: Historical    Route: Oral    lactulose 20 GM/30ML SOLN  300 mL 3 2019    [address]    Sig: Take 30 mLs by mouth 3 times daily as needed (for constipation)    Class: E-Prescribe    Notes to Pharmacy: Clear solution    Route: Oral    lidocaine (LMX4) 4 % external cream  120 g 1 2019    [facility] [address] [place] [address]    Sig: Apply topically once as needed for mild pain    Class: E-Prescribe    Route: Topical    lidocaine (LMX4) 4 % external cream            Sig: Apply 1 Application  topically once as needed for mild pain    Class: Historical    Route: Topical    LINZESS 290 MCG capsule  90 capsule 0 12/9/2019    [facility] [address] [place] [address]    Sig: TAKE 1 CAPSULE BY MOUTH EVERY MORNING BEFORE BREAKFAST    Class: E-Prescribe    LORazepam (ATIVAN) 0.5 MG tablet  10 tablet 0 12/5/2019    [facility] [address] [place] [address]    Sig: Take 1 tablet (0.5 mg) by mouth every 6 hours as needed for anxiety    Class: Local Print    Route: Oral    methylPREDNISolone (MEDROL) 32 MG tablet   0 11/11/2019        Sig: TAKE 1 TABLET BY MOUTH 12 HOURS BEFORE CT SCAN AND AGAIN 2 HOURS BEFORE CT SCAN    Class: Historical    norgestimate-ethinyl estradiol (ORTHO-CYCLEN/SPRINTEC) 0.25-35 MG-MCG tablet    6/11/2019        Sig: Take one tablet by mouth daily. Take continuously.    Class: Historical    ondansetron (ZOFRAN) 8 MG tablet    2/12/2019        Class: Historical    ondansetron (ZOFRAN-ODT) 8 MG ODT tab  180 tablet 1 6/11/2019    [facility] [address] [place] [facility] - [place], [place] - [address]    Sig: Take 1 tablet (8 mg) by mouth every 8 hours as needed for nausea    Class: E-Prescribe    Route: Oral    order for DME  1 Device 0 10/15/2019    [facility] [address] [place] [address]    Sig: Equipment being ordered: 8 1/2 tall boot    Class: Local Print    order for DME  1 Device 0 10/8/2019        Sig: Equipment being ordered: RollAbout knee scooter x 3 months    Class: Local Print    order for DME  1 Device 0 6/4/2019    [address]    Sig: Equipment being ordered: Trilok brace 8 1/2 left lower extremity    Class: Local Print    order for DME (Ended)  1 Units 0 2/7/2019 2/7/2020       Sig: Roll-A-Bout Walker. Patient can use for 3 months    Class: Local Print    order for DME  1 Device 0 1/15/2019    [address]    Sig: Equipment being ordered: size 8 1/2 walking boot tall     Class: Local Print    order for DME  1 Device 0 1/8/2019    [address]    Sig: Equipment being ordered: RollAbout knee scooter    Class: Local Print    oxyCODONE (ROXICODONE) 5 MG tablet    5/10/2019        Class: Historical    Earliest Fill Date: 5/10/2019    oxyCODONE IR (ROXICODONE) 10 MG tablet    9/25/2019        Class: Historical    Earliest Fill Date: 9/25/2019    polyethylene glycol (MIRALAX/GLYCOLAX) powder            Sig: Take 1 capful by mouth 3 times daily    Class: Historical    Route: Oral    promethazine (PHENERGAN) 12.5 MG tablet  30 tablet 3 6/11/2019    [facility] [address] [place] [facility] - [place], [place] - [address]    Sig: Take 1-2 tablets (12.5-25 mg) by mouth every 6 hours as needed for nausea    Class: E-Prescribe    Route: Oral    rivaroxaban ANTICOAGULANT (XARELTO) 20 MG TABS tablet  90 tablet 0 1/9/2020    [facility] [address] [place] [address] [facility]    Sig: Take 1 tablet (20 mg) by mouth daily (with dinner)    Class: E-Prescribe    Route: Oral    sodium chloride 0.9% SOLN BOLUS  1000 mL 11 4/1/2019    [address]    Sig: Inject 1,000 mLs into the vein daily as needed (IV abx and flushes)    Class: E-Prescribe    Route: Intravenous    sucralfate (CARAFATE) 1 GM/10ML suspension  1200 mL 2 5/17/2017    [address]    Sig: Take 10 mLs (1 g) by mouth 4 times daily    Class: E-Prescribe    Route: Oral    tiZANidine (ZANAFLEX) 4 MG tablet    3/20/2019        Class: Historical    triamcinolone (KENALOG) 0.5 % external ointment  15 g 3 7/25/2019    [facility] [address] [place] [address] [facility]    Sig: Apply 1 g topically 3 times daily as needed for irritation    Class: E-Prescribe    Route: Topical          ALLERGIES:   Amoxil [penicillins]; Bee venom; Bioflavonoids; Contrast dye; Diagnostic x-ray materials; Orange fruit [citrus];  Pineapple; Reglan [metoclopramide]; Ace inhibitors; Amitiza [lubiprostone]; Amoxicillin-pot clavulanate; Midazolam; No clinical screening - see comments; Tizanidine; Versed; Adhesive tape; Azithromycin; and Cephalexin    ROS:  An 11 point review of system was performed and pertinent negative and positives are mentioned in HPI.        Physical Examination:   VITALS:   There were no vitals taken for this visit.   Bilateral upper extremity examination reveals no changes venous stasis, including skin changes, pigmentation, telangiectasias or swelling.  There is no significant difference in diameter of the forearm and upper arm.  No varicosities noted.  Capillary refill is within normal limits.  Symmetrical 5 out of 5 motor examination throughout.  No sensory deficits.  Arterial examination is also unremarkable as detailed below.     Brachial  Radial  Ulnar    Left  2/2 2/2  2/2    Right  2/2  2/2  2/2        Labs:    BMP RESULTS:  Lab Results   Component Value Date     12/11/2019    POTASSIUM 3.5 12/11/2019    CHLORIDE 105 12/11/2019    CO2 26 12/11/2019    ANIONGAP 7 12/11/2019    GLC 62 (L) 12/11/2019    BUN 9 12/31/2019    CR 0.63 12/11/2019    GFRESTIMATED >90 12/11/2019    GFRESTBLACK >90 12/11/2019    SHANKAR 8.5 12/11/2019        CBC RESULTS:  Lab Results   Component Value Date    WBC 4.9 12/31/2019    RBC 4.35 12/31/2019    HGB 11.9 12/31/2019    HCT 37.1 12/31/2019    MCV 85 12/31/2019    MCH 27.4 12/31/2019    MCHC 32.1 12/31/2019    RDW 14.8 12/31/2019     12/31/2019       INR/PTT:  Lab Results   Component Value Date    INR 0.99 11/06/2016       Diagnostic studies: Chest CT and ultrasound did not reveal a classic findings for thoracic outlet syndrome, however flattening of arterial waveforms with arm abduction is noted.    Assessment 24-year-old woman with longstanding history of bilateral arm tingling and swelling (left greater than right) with active lifestyle and history of weightlifting, with  inconclusive noninvasive examination (CT and ultrasound) for possibility of thoracic outlet syndrome.  Given the clinical concern of the patient I am referring provider as well as the constellation of symptomatology, I do believe there would be a role for dynamic venography to evaluate possibility of thoracic outlet syndrome, including hemodynamic pressure measurements with arm in different positions.  Given the dominance of her symptoms on the left, we will focus on the left upper extremity.  Plan left upper extremity venography with dynamic range of motion and measurement of pressure gradients across the thoracic outlet and different positions.    It was a pleasure to participate in Ms. Oropeza's care today.    I, Dr Wander Clarke, was present and performed the entirety of history and exam. I have documented the findings as well as the assessment and plan.    A total of 35 minutes was spent on this clinic visit, more than half was on direct counseling and coordination of the care.    Wander Clarke MD    Vascular and Interventional Radiolgy  [facility]  Patient Care Team:  Sonja Abreu APRN CNP as PCP - General (Nurse Practitioner)  Jennifer Acevedo MD as MD (Orthopedics)  Lilliana Miramontes APRN CNP as Nurse Practitioner (Nurse Practitioner)  Austen Marquez MD as MD (Rheumatology)  Umer Heaton MD as MD (Ophthalmology)  Suzy Harman MD as MD (Family Medicine - Sports Medicine)  Esau Elliott MD as MD (Dermatology)  Frederick Bender MD as MD (Physical Medicine and Rehab)  Vidya Bryson, RN as Clinic Care Coordinator (Physical Medicine and Rehab)  Marcelle Richardson PT as Physical Therapist (Physical Medicine and Rehab)  Cata Hurst NP as Nurse Practitioner (Nurse Practitioner Psych/Mental Health)  Sonja Abreu APRN CNP as Assigned PCP  HUSSEIN Lynn MD as MD  (Cardiology)  CYNTHIA ESCALANTE

## 2020-02-17 NOTE — NURSING NOTE
Vascular Rooming Note     Samara Oropeza's goals for this visit include:   Chief Complaint   Patient presents with     Consult     Samara, is being seen today for a consult regarding TOS, left arm, numbeness, coldness, tingling and some pain, as reported by patient.     Sharmila Swain LPN

## 2020-02-17 NOTE — PATIENT INSTRUCTIONS
Samara,     Your left upper extremity venogram has been scheduled for Tuesday, February 25th @ 11 am with Dr. Clarke. Please check-in to the gold waiting room at the [facility] at 9:30 am. [facility] is located at [address] [facility], [facility].    *Please have nothing to eat for 6 hours prior to the procedure time  *You may have clear liquids (black coffee, water, broth) up to 2 hours prior to the procedure time  *You are ok to take your morning medications with enough water to swallow with the exception of your Xarelto, hold 24 hours prior to procedure  *You will need a ride home after the procedure  *Plan to spend an estimated 4 to 6 hours in the hospital on the day of your procedure    Please feel free to call me with any questions or concerns as your procedure approaches.    Belinda Farrar RN, BSN  Interventional Radiology Care Coordinator  Office: [phone]

## 2020-02-18 ENCOUNTER — OFFICE VISIT (OUTPATIENT)
Dept: PSYCHOLOGY | Facility: CLINIC | Age: 25
End: 2020-02-18
Payer: COMMERCIAL

## 2020-02-18 DIAGNOSIS — F41.1 GAD (GENERALIZED ANXIETY DISORDER): Primary | ICD-10-CM

## 2020-02-18 DIAGNOSIS — F32.A DEPRESSION: ICD-10-CM

## 2020-02-18 PROCEDURE — 90834 PSYTX W PT 45 MINUTES: CPT | Performed by: COUNSELOR

## 2020-02-18 ASSESSMENT — ANXIETY QUESTIONNAIRES
5. BEING SO RESTLESS THAT IT IS HARD TO SIT STILL: SEVERAL DAYS
IF YOU CHECKED OFF ANY PROBLEMS ON THIS QUESTIONNAIRE, HOW DIFFICULT HAVE THESE PROBLEMS MADE IT FOR YOU TO DO YOUR WORK, TAKE CARE OF THINGS AT HOME, OR GET ALONG WITH OTHER PEOPLE: SOMEWHAT DIFFICULT
GAD7 TOTAL SCORE: 9
2. NOT BEING ABLE TO STOP OR CONTROL WORRYING: SEVERAL DAYS
6. BECOMING EASILY ANNOYED OR IRRITABLE: NOT AT ALL
1. FEELING NERVOUS, ANXIOUS, OR ON EDGE: MORE THAN HALF THE DAYS
3. WORRYING TOO MUCH ABOUT DIFFERENT THINGS: MORE THAN HALF THE DAYS
7. FEELING AFRAID AS IF SOMETHING AWFUL MIGHT HAPPEN: SEVERAL DAYS
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
## 2020-02-19 ASSESSMENT — ANXIETY QUESTIONNAIRES: GAD7 TOTAL SCORE: 9

## 2020-02-24 RX ORDER — HEPARIN SODIUM 1000 [USP'U]/ML
500-6000 INJECTION, SOLUTION INTRAVENOUS; SUBCUTANEOUS
Status: CANCELLED | OUTPATIENT
Start: 2020-02-24

## 2020-02-25 ENCOUNTER — HOSPITAL ENCOUNTER (OUTPATIENT)
Facility: CLINIC | Age: 25
Discharge: HOME | End: 2020-02-25
Attending: RADIOLOGY | Admitting: RADIOLOGY
Payer: COMMERCIAL

## 2020-02-25 ENCOUNTER — APPOINTMENT (OUTPATIENT)
Dept: MEDSURG UNIT | Facility: CLINIC | Age: 25
End: 2020-02-25
Attending: RADIOLOGY
Payer: COMMERCIAL

## 2020-02-25 ENCOUNTER — APPOINTMENT (OUTPATIENT)
Dept: INTERVENTIONAL RADIOLOGY/VASCULAR | Facility: CLINIC | Age: 25
End: 2020-02-25
Attending: RADIOLOGY
Payer: COMMERCIAL

## 2020-02-25 VITALS
RESPIRATION RATE: 16 BRPM | HEART RATE: 106 BPM | OXYGEN SATURATION: 98 % | DIASTOLIC BLOOD PRESSURE: 70 MMHG | SYSTOLIC BLOOD PRESSURE: 117 MMHG | TEMPERATURE: 98.1 F

## 2020-02-25 DIAGNOSIS — G54.0 THORACIC OUTLET SYNDROME: ICD-10-CM

## 2020-02-25 LAB
B-HCG SERPL-ACNC: <1 IU/L (ref 0–5)
CREAT SERPL-MCNC: 0.64 MG/DL (ref 0.52–1.04)
ERYTHROCYTE [DISTWIDTH] IN BLOOD BY AUTOMATED COUNT: 13.5 % (ref 10–15)
GFR SERPL CREATININE-BSD FRML MDRD: >90 ML/MIN/{1.73_M2}
HCT VFR BLD AUTO: 34 % (ref 35–47)
HGB BLD-MCNC: 10.4 G/DL (ref 11.7–15.7)
INR PPP: 1.06 (ref 0.86–1.14)
MCH RBC QN AUTO: 26.1 PG (ref 26.5–33)
MCHC RBC AUTO-ENTMCNC: 30.6 G/DL (ref 31.5–36.5)
MCV RBC AUTO: 85 FL (ref 78–100)
PLATELET # BLD AUTO: 256 10E9/L (ref 150–450)
POTASSIUM SERPL-SCNC: 3.9 MMOL/L (ref 3.4–5.3)
RBC # BLD AUTO: 3.98 10E12/L (ref 3.8–5.2)
WBC # BLD AUTO: 3.7 10E9/L (ref 4–11)

## 2020-02-25 PROCEDURE — 84702 CHORIONIC GONADOTROPIN TEST: CPT | Performed by: PHYSICIAN ASSISTANT

## 2020-02-25 PROCEDURE — C1887 CATHETER, GUIDING: HCPCS

## 2020-02-25 PROCEDURE — 75820 VEIN X-RAY ARM/LEG: CPT | Mod: LT

## 2020-02-25 PROCEDURE — 85610 PROTHROMBIN TIME: CPT | Performed by: PHYSICIAN ASSISTANT

## 2020-02-25 PROCEDURE — 27210804 ZZH SHEATH CR3

## 2020-02-25 PROCEDURE — 84132 ASSAY OF SERUM POTASSIUM: CPT | Performed by: PHYSICIAN ASSISTANT

## 2020-02-25 PROCEDURE — 40000556 ZZH STATISTIC PERIPHERAL IV START W US GUIDANCE

## 2020-02-25 PROCEDURE — 85027 COMPLETE CBC AUTOMATED: CPT | Performed by: PHYSICIAN ASSISTANT

## 2020-02-25 PROCEDURE — 27210732 ZZH ACCESSORY CR1

## 2020-02-25 PROCEDURE — 25500064 ZZH RX 255 OP 636: Performed by: RADIOLOGY

## 2020-02-25 PROCEDURE — 27210908 ZZH NEEDLE CR4

## 2020-02-25 PROCEDURE — 40000166 ZZH STATISTIC PP CARE STAGE 1

## 2020-02-25 PROCEDURE — 25800030 ZZH RX IP 258 OP 636: Performed by: PHYSICIAN ASSISTANT

## 2020-02-25 PROCEDURE — 27210807 ZZH SHEATH CR6

## 2020-02-25 PROCEDURE — C1769 GUIDE WIRE: HCPCS

## 2020-02-25 PROCEDURE — 25000125 ZZHC RX 250: Performed by: RADIOLOGY

## 2020-02-25 PROCEDURE — 82565 ASSAY OF CREATININE: CPT | Performed by: PHYSICIAN ASSISTANT

## 2020-02-25 PROCEDURE — 25800030 ZZH RX IP 258 OP 636: Performed by: RADIOLOGY

## 2020-02-25 PROCEDURE — 27210888 ZZH ACCESSORY CR7

## 2020-02-25 PROCEDURE — 36005 INJECTION EXT VENOGRAPHY: CPT

## 2020-02-25 PROCEDURE — 99153 MOD SED SAME PHYS/QHP EA: CPT

## 2020-02-25 PROCEDURE — 25000128 H RX IP 250 OP 636: Performed by: RADIOLOGY

## 2020-02-25 PROCEDURE — 99152 MOD SED SAME PHYS/QHP 5/>YRS: CPT

## 2020-02-25 RX ORDER — DEXTROSE MONOHYDRATE 25 G/50ML
25-50 INJECTION, SOLUTION INTRAVENOUS
Status: DISCONTINUED | OUTPATIENT
Start: 2020-02-25 | End: 2020-02-25 | Stop reason: HOSPADM

## 2020-02-25 RX ORDER — FENTANYL CITRATE 50 UG/ML
25-50 INJECTION, SOLUTION INTRAMUSCULAR; INTRAVENOUS EVERY 5 MIN PRN
Status: DISCONTINUED | OUTPATIENT
Start: 2020-02-25 | End: 2020-02-25 | Stop reason: HOSPADM

## 2020-02-25 RX ORDER — NALOXONE HYDROCHLORIDE 0.4 MG/ML
.1-.4 INJECTION, SOLUTION INTRAMUSCULAR; INTRAVENOUS; SUBCUTANEOUS
Status: DISCONTINUED | OUTPATIENT
Start: 2020-02-25 | End: 2020-02-25 | Stop reason: HOSPADM

## 2020-02-25 RX ORDER — SODIUM CHLORIDE 9 MG/ML
INJECTION, SOLUTION INTRAVENOUS CONTINUOUS
Status: DISCONTINUED | OUTPATIENT
Start: 2020-02-25 | End: 2020-02-25 | Stop reason: HOSPADM

## 2020-02-25 RX ORDER — NICOTINE POLACRILEX 4 MG
15-30 LOZENGE BUCCAL
Status: DISCONTINUED | OUTPATIENT
Start: 2020-02-25 | End: 2020-02-25 | Stop reason: HOSPADM

## 2020-02-25 RX ORDER — LIDOCAINE 40 MG/G
CREAM TOPICAL
Status: DISCONTINUED | OUTPATIENT
Start: 2020-02-25 | End: 2020-02-25 | Stop reason: HOSPADM

## 2020-02-25 RX ORDER — HEPARIN SOD,PORCINE/0.9 % NACL 5K/1000 ML
1000-10000 INTRAVENOUS SOLUTION INTRAVENOUS
Status: COMPLETED | OUTPATIENT
Start: 2020-02-25 | End: 2020-02-25

## 2020-02-25 RX ORDER — ACETAMINOPHEN 500 MG
500 TABLET ORAL EVERY 6 HOURS PRN
Status: DISCONTINUED | OUTPATIENT
Start: 2020-02-25 | End: 2020-02-25 | Stop reason: HOSPADM

## 2020-02-25 RX ORDER — IODIXANOL 320 MG/ML
100 INJECTION, SOLUTION INTRAVASCULAR ONCE
Status: COMPLETED | OUTPATIENT
Start: 2020-02-25 | End: 2020-02-25

## 2020-02-25 RX ORDER — DIPHENHYDRAMINE HYDROCHLORIDE 50 MG/ML
50 INJECTION INTRAMUSCULAR; INTRAVENOUS
Status: COMPLETED | OUTPATIENT
Start: 2020-02-25 | End: 2020-02-25

## 2020-02-25 RX ADMIN — FENTANYL CITRATE 50 MCG: 50 INJECTION, SOLUTION INTRAMUSCULAR; INTRAVENOUS at 12:52

## 2020-02-25 RX ADMIN — SODIUM CHLORIDE 5000 UNITS: 9 INJECTION, SOLUTION INTRAVENOUS at 13:36

## 2020-02-25 RX ADMIN — FENTANYL CITRATE 50 MCG: 50 INJECTION, SOLUTION INTRAMUSCULAR; INTRAVENOUS at 12:26

## 2020-02-25 RX ADMIN — FENTANYL CITRATE 50 MCG: 50 INJECTION, SOLUTION INTRAMUSCULAR; INTRAVENOUS at 13:11

## 2020-02-25 RX ADMIN — LIDOCAINE HYDROCHLORIDE 8 ML: 10 INJECTION, SOLUTION EPIDURAL; INFILTRATION; INTRACAUDAL; PERINEURAL at 13:14

## 2020-02-25 RX ADMIN — DIPHENHYDRAMINE HYDROCHLORIDE 50 MG: 50 INJECTION, SOLUTION INTRAMUSCULAR; INTRAVENOUS at 12:16

## 2020-02-25 RX ADMIN — IODIXANOL 30 ML: 320 INJECTION, SOLUTION INTRAVASCULAR at 13:22

## 2020-02-25 RX ADMIN — SODIUM CHLORIDE: 9 INJECTION, SOLUTION INTRAVENOUS at 11:15

## 2020-02-25 RX ADMIN — FENTANYL CITRATE 50 MCG: 50 INJECTION, SOLUTION INTRAMUSCULAR; INTRAVENOUS at 12:59

## 2020-02-25 RX ADMIN — FENTANYL CITRATE 50 MCG: 50 INJECTION, SOLUTION INTRAMUSCULAR; INTRAVENOUS at 12:30

## 2020-02-25 NOTE — PROGRESS NOTES
Patient Name: Samara Oropeza  Medical Record Number: [id number]  Today's Date: 2/25/2020    Procedure: Left arm venography with hemodynamic pressures  Proceduralist: Dr. Reba Sims    Sedation start time: 1225  Sedation end time: 1320  Sedation medications administered: 250mcg fentanyl   Total sedation time: 55 minutes  Sedation Notes: Pt. allergic to versed so Benadryl given prior and fentanyl used     Procedure start time: 1225  Puncture time: 1228  Procedure end time: 1320    Report given to: Ashtyn JIMÉNEZ,     Other Notes: Pt arrived to IR room 5 from . Consent reviewed. Pt denies any questions or concerns regarding procedure. Pt positioned supine and monitored per protocol. Pt tolerated procedure without any noted complications. Pt transferred back to .

## 2020-02-25 NOTE — PROGRESS NOTES
1110  Prep is complete for procedure except for lab results.  Pt does have contrast dye allergy, but was not pre-medicated. She also has allergy to Versed.   MD & IR are aware.  She has pain in Left ankle which she states is moderate in nature at this time.  States it is sharp pain with pressure & tingling.  She has had surgery numerous times on this ankle in the past.  Avi Lyles, is here as  home.  CTRN

## 2020-02-25 NOTE — IP AVS SNAPSHOT
[facility], [place], Interventional Radiology  [address] [address]  Phone:  [phone]                                    After Visit Summary   2/25/2020    Samara Oropeza    MRN: [id number]           After Visit Summary Signature Page    I have received my discharge instructions, and my questions have been answered. I have discussed any challenges I see with this plan with the nurse or doctor.    ..........................................................................................................................................  Patient/Patient Representative Signature      ..........................................................................................................................................  Patient Representative Print Name and Relationship to Patient    ..................................................               ................................................  Date                                   Time    ..........................................................................................................................................  Reviewed by Signature/Title    ...................................................              ..............................................  Date                                               Time          22EPIC Rev 08/18

## 2020-02-25 NOTE — IP AVS SNAPSHOT
MRN:[id number]                      After Visit Summary   2/25/2020    Samara Oropeza    MRN: [id number]           Visit Information        Department      2/25/2020  9:37 AM [facility], [place], Interventional Radiology          Review of your medicines      UNREVIEWED medicines. Ask your doctor about these medicines       Dose / Directions   albuterol 108 (90 Base) MCG/ACT inhaler  Commonly known as:  PROAIR HFA/PROVENTIL HFA/VENTOLIN HFA  Used for:  Atypical chest pain      Dose:  2 puff  Inhale 2 puffs into the lungs every 6 hours as needed for shortness of breath / dyspnea or wheezing  Quantity:  1 Inhaler  Refills:  1     amitriptyline 25 MG tablet  Commonly known as:  ELAVIL  Used for:  Pruritus, Fibromyalgia      Dose:  50 mg  Take 2 tablets (50 mg) by mouth At Bedtime  Quantity:  180 tablet  Refills:  1     Apremilast 10 & 20 & 30 MG Tbpk  Commonly known as:  OTEZLA      Follow package directions  Refills:  0     artificial tears Oint ophthalmic ointment  Used for:  Bilateral dry eyes      0.5 inch strip each eye at night  Quantity:  1 Tube  Refills:  11     benzocaine 20 % Gel  Commonly known as:  TOPICALE XTRA  Used for:  Behcet's disease (H)      Apply as needed locally to mouth or nasal ulcers for pain; 4 times daily as needed  Quantity:  30 g  Refills:  1     betamethasone valerate 0.1 % external cream  Commonly known as:  VALISONE      Apply topically 2 times daily as needed  Refills:  0     bisacodyl 5 MG EC tablet  Commonly known as:  DULCOLAX  Used for:  Acute abdominal pain      Dose:  10 mg  Take 2 tablets (10 mg) by mouth daily as needed for constipation  Quantity:  30 tablet  Refills:  0     citalopram 20 MG tablet  Commonly known as:  celeXA  Used for:  TAHIR (generalized anxiety disorder)      Dose:  20 mg  Take 1 tablet (20 mg) by mouth daily  Quantity:  90 tablet  Refills:  1     clobetasol 0.05 % external cream  Commonly known as:  TEMOVATE  Used for:  Folliculitis       Apply topically 2 times daily  Quantity:  60 g  Refills:  0     colchicine 0.6 MG tablet  Commonly known as:  COLCYRS  Used for:  Abdominal pain, generalized      Dose:  0.6 mg  TAKE 1 TABLET (0.6 MG) BY MOUTH 2 TIMES DAILY  Quantity:  180 tablet  Refills:  1     cyproheptadine 4 MG tablet  Commonly known as:  PERIACTIN  Used for:  Nightmares      Dose:  8 mg  Take 2 tablets (8 mg) by mouth At Bedtime For nightmares  Quantity:  180 tablet  Refills:  2     dicyclomine 20 MG tablet  Commonly known as:  Bentyl  Used for:  Abdominal cramping      Dose:  20 mg  Take 1 tablet (20 mg) by mouth 4 times daily as needed  Quantity:  120 tablet  Refills:  3     diphenhydrAMINE 25 MG tablet  Commonly known as:  BENADRYL  Used for:  Pruritus      Dose:  25 mg  Take 1 tablet (25 mg) by mouth 3 times daily as needed (itching)  Quantity:  40 tablet  Refills:  1     EPINEPHrine 0.3 MG/0.3ML injection 2-pack  Commonly known as:  EpiPen 2-Jerome  Used for:  Hx of bee sting allergy      Dose:  0.3 mg  Inject 0.3 mLs (0.3 mg) into the muscle as needed for anaphylaxis  Quantity:  0.6 mL  Refills:  3     fluconazole 150 MG tablet  Commonly known as:  DIFLUCAN      Refills:  0     * furosemide 20 MG tablet  Commonly known as:  Lasix  Used for:  Generalized edema      Dose:  20 mg  Take 1 tablet (20 mg) by mouth daily  Quantity:  60 tablet  Refills:  3     * furosemide 40 MG tablet  Commonly known as:  Lasix  Used for:  Thoracic outlet syndrome      Dose:  40 mg  Take 1 tablet (40 mg) by mouth 2 times daily  Quantity:  60 tablet  Refills:  3     gabapentin 300 MG capsule  Commonly known as:  NEURONTIN  Used for:  TAHIR (generalized anxiety disorder)      TAKE 1 CAPSULE BY MOUTH THREE TIMES A DAY  Quantity:  90 capsule  Refills:  1     guanFACINE 1 MG tablet  Commonly known as:  TENEX  Used for:  Tics - Tourette syndrome      TAKE 3 TABLETS (3 MG) BY MOUTH TWICE DAILY IN THE MORNING AND EVENING.  Quantity:  180 tablet  Refills:  5      HYDROcodone-acetaminophen 5-325 MG tablet  Commonly known as:  NORCO      Dose:  1-2 tablet  Take 1-2 tablets by mouth  Refills:  0     hydrocortisone 1 % Crea cream  Used for:  Hemorrhoids, unspecified hemorrhoid type      Place rectally 2 times daily as needed for itching  Quantity:  28.35 g  Refills:  1     hydrOXYzine 25 MG tablet  Commonly known as:  ATARAX  Used for:  S/P ankle ligament repair      Take 1-2 tablets every 4-6 hours as needed for pain control.  Quantity:  30 tablet  Refills:  0     hyoscyamine 0.125 MG tablet  Commonly known as:  LEVSIN  Used for:  Abdominal pain, generalized      Dose:  125-250 mcg  Take 1-2 tablets (125-250 mcg) by mouth every 4 hours as needed for cramping  Quantity:  40 tablet  Refills:  1     hypromellose 0.3 % Soln ophthalmic solution  Commonly known as:  GENTEAL      Dose:  1 drop  1 drop every hour as needed for dry eyes  Refills:  0     Lactase 9000 units Chew      Refills:  0     lactulose 20 GM/30ML Soln  Used for:  Constipation, unspecified constipation type      Dose:  30 mL  Take 30 mLs by mouth 3 times daily as needed (for constipation)  Quantity:  300 mL  Refills:  3     * lidocaine 4 % external cream  Commonly known as:  LMX4      Dose:  1 Application  Apply 1 Application topically once as needed for mild pain  Refills:  0     * lidocaine 4 % external cream  Commonly known as:  LMX4  Used for:  Anal lesion      Apply topically once as needed for mild pain  Quantity:  120 g  Refills:  1     Linzess 290 MCG capsule  Used for:  Chronic idiopathic constipation  Generic drug:  linaclotide      TAKE 1 CAPSULE BY MOUTH EVERY MORNING BEFORE BREAKFAST  Quantity:  90 capsule  Refills:  0     LORazepam 0.5 MG tablet  Commonly known as:  ATIVAN  Used for:  TAHIR (generalized anxiety disorder)      Dose:  0.5 mg  Take 1 tablet (0.5 mg) by mouth every 6 hours as needed for anxiety  Quantity:  10 tablet  Refills:  0     methylPREDNISolone 32 MG tablet  Commonly known as:   MEDROL      TAKE 1 TABLET BY MOUTH 12 HOURS BEFORE CT SCAN AND AGAIN 2 HOURS BEFORE CT SCAN  Refills:  0     ondansetron 8 MG ODT tab  Commonly known as:  ZOFRAN-ODT  Used for:  Constipation, unspecified constipation type, Acute kidney injury (H), Nausea      Dose:  8 mg  Take 1 tablet (8 mg) by mouth every 8 hours as needed for nausea  Quantity:  180 tablet  Refills:  1     order for DME  Used for:  S/P ankle ligament repair  Ask about: Should I take this medication?      Roll-A-Bout Walker. Patient can use for 3 months  Quantity:  1 Units  Refills:  0     oxyCODONE 5 MG tablet  Commonly known as:  ROXICODONE  Ask about: Which instructions should I use?      Refills:  0     polyethylene glycol powder  Commonly known as:  MIRALAX      Dose:  1 capful  Take 1 capful by mouth 3 times daily  Refills:  0     promethazine 12.5 MG tablet  Commonly known as:  PHENERGAN  Used for:  Nausea      Dose:  12.5-25 mg  Take 1-2 tablets (12.5-25 mg) by mouth every 6 hours as needed for nausea  Quantity:  30 tablet  Refills:  3     rivaroxaban ANTICOAGULANT 20 MG Tabs tablet  Commonly known as:  XARELTO  Used for:  Deep vein thrombosis (DVT) of upper extremity, unspecified chronicity, unspecified laterality, unspecified vein (H)      Dose:  20 mg  Take 1 tablet (20 mg) by mouth daily (with dinner)  Quantity:  90 tablet  Refills:  0     sucralfate 1 GM/10ML suspension  Commonly known as:  Carafate  Used for:  Bile reflux gastritis, Nausea      Dose:  1 g  Take 10 mLs (1 g) by mouth 4 times daily  Quantity:  1200 mL  Refills:  2     tiZANidine 4 MG tablet  Commonly known as:  ZANAFLEX      Refills:  0     triamcinolone 0.5 % external ointment  Commonly known as:  KENALOG  Used for:  Behcet recurrent disease (H)      Dose:  1 applicator  Apply 1 g topically 3 times daily as needed for irritation  Quantity:  15 g  Refills:  3         * This list has 4 medication(s) that are the same as other medications prescribed for you. Read the  directions carefully, and ask your doctor or other care provider to review them with you.            CONTINUE these medicines which have NOT CHANGED       Dose / Directions   order for DME  Used for:  S/P ankle ligament repair      Equipment being ordered: size 8 1/2 walking boot tall  Quantity:  1 Device  Refills:  0     order for DME  Used for:  S/P foot surgery, left, S/P ankle ligament repair      Equipment being ordered: Trilok brace 8 1/2 left lower extremity  Quantity:  1 Device  Refills:  0              Protect others around you: Learn how to safely use, store and throw away your medicines at www.disposemymeds.org.       Follow-ups after your visit       Your next 10 appointments already scheduled    Mar 03, 2020 10:00 AM CST  Return Visit with Ernestina Patel [facility] ([address] [address]  [phone]      Mar 10, 2020 10:00 AM CDT  (Arrive by 9:45 AM)  New Patient Visit with Jose Mcqueen MD  [facility] ([facility]) [address] [place] [address]  [phone]      Mar 17, 2020 10:00 AM CDT  Return Visit with Ernestina Patel [facility] ([address] [address]  [phone]      Mar 31, 2020 11:00 AM CDT  Return Visit with Ernestina Patel [facility] ([address] [address]  [phone]      Apr 06, 2020  8:20 AM CDT  (Arrive by 8:05 AM)  Return Botox with Chanda Franco MD  [facility] ([facility]) [address] [place] [id number]  [phone]      Apr 22, 2020 11:00 AM CDT  (Arrive by 10:45 AM)  Return Headache with Darrius SAVAGE Cha, MD  [facility] ([facility]  [facility]) [address] [facility]  [address] [facility] [address] [phone]         Care Instructions       Further instructions from your care team       \[facility]         Interventional Radiology  Discharge Instructions Post Left Upper Extremity Venogram    AFTER YOU GO HOME  ? DO NOT drive or operate machinery at home or at work for at least 24 hours  ? If you were given sedation; we recommend that an adult stay with you for the first 24 hours  ? DO relax and take it easy for 24 hours  ? DO drink plenty of fluids  ? DO resume your regular diet, unless otherwise instructed by your Primary Physician  ? DO NOT SMOKE FOR AT LEAST 24 HOURS, if you have been given any medications that were to help you relax or sedate you during your procedure  ? DO NOT drink alcoholic beverages the day of your procedure  ? DO NOT do any strenuous exercise or lifting for at least 5 days following your procedure  ? DO NOT take a bath or shower for at least 12 hours following your procedure.  Change the dressing daily with your shower for 5 days.  Never leave               A wet dressing in place.  ? DO NOT scrub the procedure site vigorously for 5 days  ? DO NOT make any important or legal decisions for 24 hours following your procedure if you were given sedation    CALL THE PHYSICIAN IF:  ? You start bleeding from the procedure site.  If you do start to bleed from that site, lie down flat and hold pressure on the site.  Your physician will tell you if you need to return to the hospital.  It is common to have a small bruise or lump at the site  ? You experience pain or redness at the puncture site  ? You develop nausea or vomiting  ? You develop hives or a rash or unexplained itching  ? You develop a temperature of 101 degrees F or greater    ADDITIONAL INSTRUCTIONS  ? Support the puncture site for coughing, sneezing, or moving your bowels for the first 48 hours  ? No tub bath, hot tubs, or  swimming for 5 days  ? No lotion or powder to the puncture site for 5 days    [facility] INTERVENTIONAL RADIOLOGY DEPARTMENT  Procedure Physician: Dr. Wander Devine  Date of procedure: February 25, 2020 Tuesday  Telephone Numbers: [phone] Monday-Friday 8:00 am to 4:30 pm  [phone] After 4:30 pm Monday-Friday, Weekends & Holidays.   Ask for the Interventional Radiologist on call.  Someone is on call 24 hrs/day  [facility] toll free number: [phone] Monday-Friday 8:00 am to 4:30 pm  [facility] Emergency Dept: [phone]        Additional Information About Your Visit       [facility] Information    [facility] gives you secure access to your electronic health record. If you see a primary care provider, you can also send messages to your care team and make appointments. If you have questions, please call your primary care clinic.  If you do not have a primary care provider, please call [phone] and they will assist you.       Care EveryWhere ID    This is your Care EveryWhere ID. This could be used by other organizations to access your [facility] medical records  [id number]       Your Vitals Were  Most recent update: 2/25/2020  2:31 PM    Blood Pressure   117/70 (BP Location: Right arm)    Pulse   106    Temperature   98.1  F (36.7  C) (Oral)    Respirations   16    Pulse Oximetry   98%          Primary Care Provider Office Phone # Fax #    Sonja FinleyEVI Leal [phone]      Equal Access to Services    NABIL GALEANO : Hadii aad ku hadasho Soomaali, waaxda luqadaha, qaybta kaalmada adeegyada, waxay brad rodriguez . So [facility] [phone].    ATENCIÓN: Si habla español, tiene a livingston disposición servicios gratuitos de asistencia lingüística. Llame al [phone].    We comply with applicable federal and state civil rights laws, including the [place] Human Rights Act. We do not discriminate on the basis of race, color, creed, Mormon, national origin, marital status, age, disability,  sex, sexual orientation, or gender identity.       Thank you!    Thank you for choosing [facility] for your care. Our goal is always to provide you with excellent care. Hearing back from our patients is one way we can continue to improve our services. Please take a few minutes to complete the written survey that you may receive in the mail after you visit with us. Thank you!            Medication List      Medications          Morning Afternoon Evening Bedtime As Needed    order for DME  INSTRUCTIONS:  Equipment being ordered: size 8 1/2 walking boot tall                     order for DME  INSTRUCTIONS:  Equipment being ordered: Trilok brace 8 1/2 left lower extremity                       ASK your doctor about these medications          Morning Afternoon Evening Bedtime As Needed    albuterol 108 (90 Base) MCG/ACT inhaler  Also known as:  PROAIR HFA/PROVENTIL HFA/VENTOLIN HFA  INSTRUCTIONS:  Inhale 2 puffs into the lungs every 6 hours as needed for shortness of breath / dyspnea or wheezing                     amitriptyline 25 MG tablet  Also known as:  ELAVIL  INSTRUCTIONS:  Take 2 tablets (50 mg) by mouth At Bedtime                     Apremilast 10 & 20 & 30 MG Tbpk  Also known as:  OTEZLA  INSTRUCTIONS:  Follow package directions                     artificial tears Oint ophthalmic ointment  INSTRUCTIONS:  0.5 inch strip each eye at night                     benzocaine 20 % Gel  Also known as:  TOPICALE XTRA  INSTRUCTIONS:  Apply as needed locally to mouth or nasal ulcers for pain; 4 times daily as needed                     betamethasone valerate 0.1 % external cream  Also known as:  VALISONE  INSTRUCTIONS:  Apply topically 2 times daily as needed                     bisacodyl 5 MG EC tablet  Also known as:  DULCOLAX  INSTRUCTIONS:  Take 2 tablets (10 mg) by mouth daily as needed for constipation                     citalopram 20 MG tablet  Also known as:  celeXA  INSTRUCTIONS:  Take 1 tablet (20 mg) by mouth  daily                     clobetasol 0.05 % external cream  Also known as:  TEMOVATE  INSTRUCTIONS:  Apply topically 2 times daily                     colchicine 0.6 MG tablet  Also known as:  COLCYRS  INSTRUCTIONS:  TAKE 1 TABLET (0.6 MG) BY MOUTH 2 TIMES DAILY  Doctor's comments:  REQUESTING REFILL ON COLCHICINE 0.6 MG TAB, CURRENT RX IS OUT OF REFILLS. PLEASE SEND NEW RX. THANKS!                     cyproheptadine 4 MG tablet  Also known as:  PERIACTIN  INSTRUCTIONS:  Take 2 tablets (8 mg) by mouth At Bedtime For nightmares                     dicyclomine 20 MG tablet  Also known as:  Bentyl  INSTRUCTIONS:  Take 1 tablet (20 mg) by mouth 4 times daily as needed                     diphenhydrAMINE 25 MG tablet  Also known as:  BENADRYL  INSTRUCTIONS:  Take 1 tablet (25 mg) by mouth 3 times daily as needed (itching)  LAST TAKEN:  Ask your nurse or doctor                     EPINEPHrine 0.3 MG/0.3ML injection 2-pack  Also known as:  EpiPen 2-Jerome  INSTRUCTIONS:  Inject 0.3 mLs (0.3 mg) into the muscle as needed for anaphylaxis                     fluconazole 150 MG tablet  Also known as:  DIFLUCAN                     * furosemide 20 MG tablet  Also known as:  Lasix  INSTRUCTIONS:  Take 1 tablet (20 mg) by mouth daily                     * furosemide 40 MG tablet  Also known as:  Lasix  INSTRUCTIONS:  Take 1 tablet (40 mg) by mouth 2 times daily                     gabapentin 300 MG capsule  Also known as:  NEURONTIN  INSTRUCTIONS:  TAKE 1 CAPSULE BY MOUTH THREE TIMES A DAY                     guanFACINE 1 MG tablet  Also known as:  TENEX  INSTRUCTIONS:  TAKE 3 TABLETS (3 MG) BY MOUTH TWICE DAILY IN THE MORNING AND EVENING.                     HYDROcodone-acetaminophen 5-325 MG tablet  Also known as:  NORCO  INSTRUCTIONS:  Take 1-2 tablets by mouth                     hydrocortisone 1 % Crea cream  INSTRUCTIONS:  Place rectally 2 times daily as needed for itching                     hydrOXYzine 25 MG tablet  Also  known as:  ATARAX  INSTRUCTIONS:  Take 1-2 tablets every 4-6 hours as needed for pain control.                     hyoscyamine 0.125 MG tablet  Also known as:  LEVSIN  INSTRUCTIONS:  Take 1-2 tablets (125-250 mcg) by mouth every 4 hours as needed for cramping                     hypromellose 0.3 % Soln ophthalmic solution  Also known as:  GENTEAL  INSTRUCTIONS:  1 drop every hour as needed for dry eyes                     Lactase 9000 units Chew                     lactulose 20 GM/30ML Soln  INSTRUCTIONS:  Take 30 mLs by mouth 3 times daily as needed (for constipation)  Doctor's comments:  Clear solution                     * lidocaine 4 % external cream  Also known as:  LMX4  INSTRUCTIONS:  Apply 1 Application topically once as needed for mild pain  LAST TAKEN:  Ask your nurse or doctor                     * lidocaine 4 % external cream  Also known as:  LMX4  INSTRUCTIONS:  Apply topically once as needed for mild pain  LAST TAKEN:  Ask your nurse or doctor                     Linzess 290 MCG capsule  INSTRUCTIONS:  TAKE 1 CAPSULE BY MOUTH EVERY MORNING BEFORE BREAKFAST  Generic drug:  linaclotide                     LORazepam 0.5 MG tablet  Also known as:  ATIVAN  INSTRUCTIONS:  Take 1 tablet (0.5 mg) by mouth every 6 hours as needed for anxiety                     methylPREDNISolone 32 MG tablet  Also known as:  MEDROL  INSTRUCTIONS:  TAKE 1 TABLET BY MOUTH 12 HOURS BEFORE CT SCAN AND AGAIN 2 HOURS BEFORE CT SCAN                     ondansetron 8 MG ODT tab  Also known as:  ZOFRAN-ODT  INSTRUCTIONS:  Take 1 tablet (8 mg) by mouth every 8 hours as needed for nausea                     order for DME  INSTRUCTIONS:  Roll-A-Bout Walker. Patient can use for 3 months  Ask about: Should I take this medication?                     oxyCODONE 5 MG tablet  Also known as:  ROXICODONE  Ask about: Which instructions should I use?                     polyethylene glycol powder  Also known as:  MIRALAX  INSTRUCTIONS:  Take 1  capful by mouth 3 times daily                     promethazine 12.5 MG tablet  Also known as:  PHENERGAN  INSTRUCTIONS:  Take 1-2 tablets (12.5-25 mg) by mouth every 6 hours as needed for nausea                     rivaroxaban ANTICOAGULANT 20 MG Tabs tablet  Also known as:  XARELTO  INSTRUCTIONS:  Take 1 tablet (20 mg) by mouth daily (with dinner)                     sucralfate 1 GM/10ML suspension  Also known as:  Carafate  INSTRUCTIONS:  Take 10 mLs (1 g) by mouth 4 times daily                     tiZANidine 4 MG tablet  Also known as:  ZANAFLEX                     triamcinolone 0.5 % external ointment  Also known as:  KENALOG  INSTRUCTIONS:  Apply 1 g topically 3 times daily as needed for irritation                        * This list has 4 medication(s) that are the same as other medications prescribed for you. Read the directions carefully, and ask your doctor or other care provider to review them with you.

## 2020-02-25 NOTE — DISCHARGE INSTRUCTIONS
\[facility]         Interventional Radiology  Discharge Instructions Post Left Upper Extremity Venogram    AFTER YOU GO HOME  ? DO NOT drive or operate machinery at home or at work for at least 24 hours  ? If you were given sedation; we recommend that an adult stay with you for the first 24 hours  ? DO relax and take it easy for 24 hours  ? DO drink plenty of fluids  ? DO resume your regular diet, unless otherwise instructed by your Primary Physician  ? DO NOT SMOKE FOR AT LEAST 24 HOURS, if you have been given any medications that were to help you relax or sedate you during your procedure  ? DO NOT drink alcoholic beverages the day of your procedure  ? DO NOT do any strenuous exercise or lifting for at least 5 days following your procedure  ? DO NOT take a bath or shower for at least 12 hours following your procedure.  Change the dressing daily with your shower for 5 days.  Never leave               A wet dressing in place.  ? DO NOT scrub the procedure site vigorously for 5 days  ? DO NOT make any important or legal decisions for 24 hours following your procedure if you were given sedation    CALL THE PHYSICIAN IF:  ? You start bleeding from the procedure site.  If you do start to bleed from that site, lie down flat and hold pressure on the site.  Your physician will tell you if you need to return to the hospital.  It is common to have a small bruise or lump at the site  ? You experience pain or redness at the puncture site  ? You develop nausea or vomiting  ? You develop hives or a rash or unexplained itching  ? You develop a temperature of 101 degrees F or greater    ADDITIONAL INSTRUCTIONS  ? Support the puncture site for coughing, sneezing, or moving your bowels for the first 48 hours  ? No tub bath, hot tubs, or swimming for 5 days  ? No lotion or powder to the puncture site for 5 days    [facility] INTERVENTIONAL RADIOLOGY DEPARTMENT  Procedure Physician: Dr. Wander Devine  Date of procedure:  February 25, 2020 Tuesday  Telephone Numbers: [phone] Monday-Friday 8:00 am to 4:30 pm  [phone] After 4:30 pm Monday-Friday, Weekends & Holidays.   Ask for the Interventional Radiologist on call.  Someone is on call 24 hrs/day  [facility] toll free number: [phone] Monday-Friday 8:00 am to 4:30 pm  [facility] Emergency Dept: [phone]

## 2020-02-25 NOTE — PROGRESS NOTES
1330  Returned to  per cart accompanied by RN.  S/O Left Upper Extremity Venogram.  Site at right Femoral Vein is FDI.  No Hematoma noted.  Denies any pain at this time.   Declines food now, but is drinking soda & water.  1400  Fikandis is at bedside.    Resting, denies need.  1445  Up, ambulated in linn & to BR.  Tolerated activity well.  Voided without difficulty.  Right Groin site remains FDI after ambulation.  1500  Discharge Instructions reviewed with pt.  Verbally demonstrates understanding of instructions.  1510  Discharged to care of kandis per  accompanied by staff.  CTRN

## 2020-02-25 NOTE — IR NOTE
Hemodynamic Venous Pressures of Left Upper Extremity      (Neutral Position)  Axillary Vein  = 18  Subclavian Lateral 1/3 = 18  Middle Subclavian = 17  Medial Subclavian = 17  Innominate = 16    (180 degrees abduction)  Axillary Vein = 16  Subclavian Lateral 1/3 = 15  Middle Subclavian = 16  Medial Subclavian = 16  Innominate = 15

## 2020-02-25 NOTE — PRE-PROCEDURE
GENERAL PRE-PROCEDURE:   Procedure:  Left upper extremity venogram  Date/Time:  2/25/2020 10:51 AM    Written consent obtained?: Yes    Risks and benefits: Risks, benefits and alternatives were discussed    Consent given by:  Patient  Patient states understanding of procedure being performed: Yes    Patient's understanding of procedure matches consent: Yes    Procedure consent matches procedure scheduled: Yes    Expected level of sedation:  Moderate  Appropriately NPO:  Yes  ASA Class:  Class 1- healthy patient  Mallampati  :  Grade 2- soft palate, base of uvula, tonsillar pillars, and portion of posterior pharyngeal wall visible  Lungs:  Lungs clear with good breath sounds bilaterally  Heart:  Normal heart sounds and rate  History & Physical reviewed:  History and physical reviewed and no updates needed  Statement of review:  I have reviewed the lab findings, diagnostic data, medications, and the plan for sedation

## 2020-02-25 NOTE — PROCEDURES
[facility], [place]    Procedure: IR Procedure Note  Date/Time: 2/25/2020 1:28 PM  Performed by: Antwan Britton MD  Authorized by: Antwan Britton MD     UNIVERSAL PROTOCOL   Site Marked: NA  Prior Images Obtained and Reviewed:  Yes  Required items: Required blood products, implants, devices and special equipment available    Patient identity confirmed:  Verbally with patient, arm band, provided demographic data and hospital-assigned identification number  Patient was reevaluated immediately before administering moderate or deep sedation or anesthesia  Confirmation Checklist:  Patient's identity using two indicators, relevant allergies, procedure was appropriate and matched the consent or emergent situation and correct equipment/implants were available  Time out: Immediately prior to the procedure a time out was called    Universal Protocol: the Joint Commission Universal Protocol was followed    Preparation: Patient was prepped and draped in usual sterile fashion           ANESTHESIA    Anesthesia: Local infiltration  Local Anesthetic:  Lidocaine 1% without epinephrine      SEDATION    Patient Sedated: Yes    Sedation:  Fentanyl and midazolam  Vital signs: Vital signs monitored during sedation    See dictated procedure note for full details.  Findings: LUE dynamic venogram with pressure measurements  No evidence of thoracic outlet syndrome    Specimens: none    Complications: None    Condition: Stable    Plan: Bedrest 1 hour    PROCEDURE   Patient Tolerance:  Patient tolerated the procedure well with no immediate complications    Length of time physician/provider present for 1:1 monitoring during sedation: 60

## 2020-02-27 ENCOUNTER — TELEPHONE (OUTPATIENT)
Dept: VASCULAR SURGERY | Facility: CLINIC | Age: 25
End: 2020-02-27

## 2020-02-27 NOTE — PROGRESS NOTES
INTERVENTIONAL RADIOLOGY CONSULTATION    Name: Samara Oropeza  Age: 24 year old   Referring Physician: Dr. Bradshaw   REASON FOR REFERRAL: Evaluation/diagnostic work-up for possible thoracic outlet syndrome    HPI: Ms. Oropeza is a very pleasant 24-year-old patient who was referred to me by Dr. Bradshaw from neurotology with a past medical history most significant for migraine headaches, bilateral arm tingling, and hand swelling (left greater than right).  She has an active lifestyle and does lift weight for bodybuilding.  Her symptoms have been going on for a number of years.  She has had multiple clinical encounters to figure out the reason.  Dr. Bradshaw has started the preliminary work-up for possibility of thoracic outlet syndrome.  The chest CT does not reveal any structural cause for this entity.  Her ultrasound reveals flattening of arterial waveforms with arm abduction, however venous flow continues in the dynamic range of motion.    PAST MEDICAL HISTORY:   Past Medical History:   Diagnosis Date     Anemia      Anxiety      Arthritis      Behcet's disease (H)      Cervical adenitis May 2010     Chronic abdominal pain      Constipation, chronic 1994     Fibromyalgia      Gastro-oesophageal reflux disease      Gastroparesis      Irregular heart beat     tachycardia, has had workup     Migraines      Neuromuscular disorder (H)     fibramyalgia     Palpitations      PONV (postoperative nausea and vomiting)      Seizure (H)      Seizures (H)     unknown etiology     Syncope      Tourette's        PAST SURGICAL HISTORY:   Past Surgical History:   Procedure Laterality Date     ARTHROSCOPY ANKLE, OPEN REPAIR LIGAMENT, COMBINED Left 9/25/2019    Procedure: LEFT ANKLE ARTHROSCOPY WITH LIGAMENT REPAIR;  Surgeon: Andres Johnson DPM;  Location: [facility] OR     ARTHROSCOPY ANKLE, REPAIR LIGAMENT Left 1/2/2019    Procedure: Ankle arthroscopy and sinus tarsi evacuation, ligament repair, left lower extremity;  Surgeon: Elizabeth  ROSIBEL Campos;  Location: [facility] OR     ARTHROSCOPY KNEE WITH PATELLAR REALIGNMENT  7/25/2013    Procedure: ARTHROSCOPY KNEE WITH PATELLAR REALIGNMENT;  Left Knee Arthroscopy, Medial Patellofemoral Ligament Reconstruction with Allograft  ;  Surgeon: Jennifer Acevedo MD;  Location: [facility]     COLONOSCOPY  2015     DENTAL SURGERY  1996    Teeth removal     ENDOSCOPY UPPER, COLONOSCOPY, COMBINED  2005     HC ESOPH/GAS REFLUX TEST W NASAL IMPED >1 HR N/A 2/15/2017    Procedure: ESOPHAGEAL IMPEDENCE FUNCTION TEST WITH 24 HOUR PH GREATER THAN 1 HOUR;  Surgeon: Timothy Matta MD;  Location: [facility]     IR PICC PLACEMENT > 5 YRS OF AGE  3/13/2019     IRRIGATION AND DEBRIDEMENT FOOT, COMBINED Left 3/12/2019    Procedure: COMBINED IRRIGATION AND DEBRIDEMENT LEFT ANKLE;  Surgeon: Micha Glover MD;  Location: [facility]     IRRIGATION AND DEBRIDEMENT LOWER EXTREMITY, COMBINED Left 5/7/2019    Procedure: 1.  Excision of wound down to and including deep fascia, less than 20 cm2.  2.  Irrigation and debridement, left ankle.;  Surgeon: Andres Johnson DPM;  Location: [facility] OR     PICC INSERTION Left 05/05/2019    4Fr - 45cm (5cm external), Basilic vein, SVC RA junction       FAMILY HISTORY:   Family History   Problem Relation Age of Onset     Depression Mother      Neurologic Disorder Mother         Migraines, take imitrex injection.  Also in maternal grandmother.       Alcohol/Drug Father      Hypertension Father      Depression Father      Osteoarthritis Father      Cardiovascular Maternal Grandmother      Depression Maternal Grandmother      Hypertension Maternal Grandmother      Alzheimer Disease Maternal Grandmother      Cardiovascular Maternal Grandfather      Hypertension Maternal Grandfather      Depression Maternal Grandfather      Alcohol/Drug Maternal Grandfather      Diabetes Maternal Grandfather      Cardiovascular Paternal Grandmother      Hypertension Paternal Grandmother      Cardiovascular Paternal  Grandfather      Hypertension Paternal Grandfather      Glaucoma No family hx of      Macular Degeneration No family hx of        SOCIAL HISTORY:   Social History     Tobacco Use     Smoking status: Never Smoker     Smokeless tobacco: Never Used   Substance Use Topics     Alcohol use: Not Currently     Alcohol/week: 1.0 standard drinks     Types: 1 Standard drinks or equivalent per week     Comment: rarely       PROBLEM LIST:   Patient Active Problem List    Diagnosis Date Noted     Infection of joint of ankle (H) 05/06/2019     Priority: Medium     Cellulitis 03/09/2019     Priority: Medium     Displacement of lumbar intervertebral disc without myelopathy 11/13/2018     Priority: Medium     Overview:   Created by Conversion       Iron deficiency associated with nonfamilial restless legs syndrome 11/13/2018     Priority: Medium     Enthesopathy of hip region 11/13/2018     Priority: Medium     Overview:   Created by Conversion       Tourette's syndrome 11/13/2018     Priority: Medium     Overview:   Created by Conversion       Somatic symptom disorder 06/01/2018     Priority: Medium     Pelvic floor weakness 04/25/2018     Priority: Medium     Spells of decreased attentiveness 12/19/2017     Priority: Medium     Mobile cecum 11/09/2017     Priority: Medium     Cecum noted in Right lower quadrant on 4/17 CT scan, and in Left upper Quadrant on CT on 11/2017.       Vitamin D deficiency 10/11/2017     Priority: Medium     How low, unknown/not found. On D when tested at 27. Starting cholecalciferol October 2017. Needs recheck.       Convulsions, unspecified convulsion type (H) 10/03/2017     Priority: Medium     Transient alteration of awareness 10/03/2017     Priority: Medium     Chronic pain syndrome 07/27/2017     Priority: Medium     Major depressive disorder, recurrent episode, moderate (H) 06/27/2017     Priority: Medium     Cervical pain 05/02/2017     Priority: Medium     Acute left ankle pain 03/31/2017      Priority: Medium     Cervical dystonia 03/28/2017     Priority: Medium     PTSD (post-traumatic stress disorder) 01/17/2017     Priority: Medium     Patellofemoral instability 10/20/2016     Priority: Medium     Fibromyalgia 08/04/2016     Priority: Medium     Rheumatoid arthritis of multiple sites without rheumatoid factor (H) 08/04/2016     Priority: Medium     Raynaud's disease without gangrene 08/04/2016     Priority: Medium     Chronic abdominal pain 08/04/2016     Priority: Medium     Palpitations 01/12/2016     Priority: Medium     On colchicine therapy 10/30/2015     Priority: Medium     Spell of shaking 05/06/2015     Priority: Medium     Migraine 02/04/2015     Priority: Medium     Behcet's disease (H) 12/10/2014     Priority: Medium     Headaches due to old head injury 11/12/2013     Priority: Medium     Milk protein intolerance 10/11/2013     Priority: Medium     Intestinal malabsorption 10/11/2013     Priority: Medium     Concussion 02/13/2013     Priority: Medium     Jan 2013, with prolonged recovery- followed by sports med         Knee pain 01/03/2013     Priority: Medium     Generalized anxiety disorder 06/25/2009     Priority: Medium     Tics - Tourette syndrome 05/18/2009     Priority: Medium     Followed by psychotherapy. Symptoms well managed. Originally diagnosed at [facility]. (Dr. Simpson)           IBS (irritable bowel syndrome) 05/18/2009     Priority: Medium     Allergic rhinitis 05/18/2009     Priority: Medium     GERD (gastroesophageal reflux disease) 01/10/2008     Priority: Medium     Gastroparesis 1994     Priority: Medium       MEDICATIONS:   Prescription Medications as of 2/17/2020       Rx Number Disp Refills Start End Last Dispensed Date Next Fill Date Owning Pharmacy    albuterol (PROAIR HFA/PROVENTIL HFA/VENTOLIN HFA) 108 (90 BASE) MCG/ACT Inhaler  1 Inhaler 1 1/11/2017    [facility] - [place], [place] - [address] [place] [address]    Sig: Inhale 2 puffs into the lungs  every 6 hours as needed for shortness of breath / dyspnea or wheezing    Class: E-Prescribe    Route: Inhalation    amitriptyline (ELAVIL) 25 MG tablet  180 tablet 1 2019    [facility] [address] [place] [address]    Sig: Take 2 tablets (50 mg) by mouth At Bedtime    Class: E-Prescribe    Route: Oral    Apremilast (OTEZLA) 10 & 20 & 30 MG TBPK    2019        Sig: Follow package directions    Class: Historical    artificial tears OINT ophthalmic ointment  1 Tube 11 5/3/2018    [address]    Si.5 inch strip each eye at night    Class: E-Prescribe    benzocaine (TOPICALE XTRA) 20 % GEL  30 g 1 3/21/2019    [address]    Sig: Apply as needed locally to mouth or nasal ulcers for pain; 4 times daily as needed    Class: E-Prescribe    betamethasone valerate (VALISONE) 0.1 % cream            Sig: Apply topically 2 times daily as needed     Class: Historical    Route: Topical    bisacodyl (DULCOLAX) 5 MG EC tablet  30 tablet 0 2019    [address]    Sig: Take 2 tablets (10 mg) by mouth daily as needed for constipation    Class: E-Prescribe    Route: Oral    citalopram (CELEXA) 20 MG tablet  90 tablet 1 2019    [facility] [address] [place] [address]    Sig: Take 1 tablet (20 mg) by mouth daily    Class: E-Prescribe    Route: Oral    clindamycin (CLEOCIN) 300 MG capsule   0 2019        Sig: TAKE 1 CAPSULE BY MOUTH THREE TIMES A DAY FOR 10 DAYS    Class: Historical    clobetasol (TEMOVATE) 0.05 % cream  60 g 0 10/31/2016    [address]    Sig: Apply topically 2 times daily    Class: E-Prescribe    Route: Topical    colchicine (COLCYRS) 0.6 MG tablet  180 tablet 1 2019    [facility] [address] [place] [address]    Sig: TAKE 1 TABLET (0.6 MG) BY MOUTH 2 TIMES  DAILY    Class: E-Prescribe    Notes to Pharmacy: REQUESTING REFILL ON COLCHICINE 0.6 MG TAB, CURRENT RX IS OUT OF REFILLS. PLEASE SEND NEW RX. THANKS!    Route: Oral    cyproheptadine (PERIACTIN) 4 MG tablet  180 tablet 2 6/11/2019    [facility] [address] [place] [facility] - [place], [place] - [address]    Sig: Take 2 tablets (8 mg) by mouth At Bedtime For nightmares    Class: E-Prescribe    Route: Oral    dicyclomine (BENTYL) 20 MG tablet  120 tablet 3 4/8/2019    [address]    Sig: Take 1 tablet (20 mg) by mouth 4 times daily as needed    Class: E-Prescribe    Route: Oral    diphenhydrAMINE (BENADRYL) 25 MG tablet  40 tablet 1 3/15/2019    [place] [address] [address]    Sig: Take 1 tablet (25 mg) by mouth 3 times daily as needed (itching)    Class: E-Prescribe    Route: Oral    EPINEPHrine (EPIPEN 2-EAMON) 0.3 MG/0.3ML injection  0.6 mL 3 4/19/2017    [address]    Sig: Inject 0.3 mLs (0.3 mg) into the muscle as needed for anaphylaxis    Class: E-Prescribe    Route: Intramuscular    fluconazole (DIFLUCAN) 150 MG tablet    6/3/2019        Class: Historical    furosemide (LASIX) 20 MG tablet  60 tablet 3 11/6/2019    [facility] [address] [place] [address] [facility]    Sig: Take 1 tablet (20 mg) by mouth daily    Class: E-Prescribe    Route: Oral    furosemide (LASIX) 40 MG tablet  60 tablet 3 1/22/2020    [facility] [address] [place] [address] [facility]    Sig: Take 1 tablet (40 mg) by mouth 2 times daily    Class: E-Prescribe    Route: Oral    gabapentin (NEURONTIN) 300 MG capsule  90 capsule 1 1/23/2020    [facility] [address] [place] [address] [facility]    Sig: TAKE 1 CAPSULE BY MOUTH THREE TIMES A DAY    Class: E-Prescribe    guanFACINE (TENEX) 1 MG tablet  180 tablet 5 12/31/2019    [facility] [address] [place] [address] [facility]    Sig: TAKE 3 TABLETS (3 MG) BY MOUTH TWICE  DAILY IN THE MORNING AND EVENING.    Class: E-Prescribe    HYDROcodone-acetaminophen (NORCO) 5-325 MG tablet    2019        Sig: Take 1-2 tablets by mouth    Class: Historical    Earliest Fill Date: 2019    Route: Oral    hydrocortisone 1 % CREA cream  28.35 g 1 2018    [address]    Sig: Place rectally 2 times daily as needed for itching    Class: E-Prescribe    Route: Rectal    hydrOXYzine (ATARAX) 25 MG tablet  30 tablet 0 2019    [facility] [address] [place] [address]    Sig: Take 1-2 tablets every 4-6 hours as needed for pain control.    Class: Local Print    hyoscyamine (ANASPAZ/LEVSIN) 0.125 MG tablet  40 tablet 1 2019    [facility] [address] [place] [facility] - [place], [place] - [address]    Sig: Take 1-2 tablets (125-250 mcg) by mouth every 4 hours as needed for cramping    Class: E-Prescribe    Route: Oral    hypromellose (GENTEAL) 0.3 % SOLN            Si drop every hour as needed for dry eyes     Class: Historical    Lactase 9000 units CHEW    4/10/2008        Class: Historical    Route: Oral    lactulose 20 GM/30ML SOLN  300 mL 3 2019    [address]    Sig: Take 30 mLs by mouth 3 times daily as needed (for constipation)    Class: E-Prescribe    Notes to Pharmacy: Clear solution    Route: Oral    lidocaine (LMX4) 4 % external cream  120 g 1 2019    [facility] [address] [place] [address] [facility]    Sig: Apply topically once as needed for mild pain    Class: E-Prescribe    Route: Topical    lidocaine (LMX4) 4 % external cream            Sig: Apply 1 Application topically once as needed for mild pain    Class: Historical    Route: Topical    LINZESS 290 MCG capsule  90 capsule 0 2019    [facility] [address] [place] [address] [facility]    Sig: TAKE 1 CAPSULE BY MOUTH EVERY MORNING BEFORE BREAKFAST    Class: E-Prescribe    LORazepam (ATIVAN) 0.5 MG  tablet  10 tablet 0 12/5/2019    [facility] [address] [place] [address] [employer]    Sig: Take 1 tablet (0.5 mg) by mouth every 6 hours as needed for anxiety    Class: Local Print    Route: Oral    methylPREDNISolone (MEDROL) 32 MG tablet   0 11/11/2019        Sig: TAKE 1 TABLET BY MOUTH 12 HOURS BEFORE CT SCAN AND AGAIN 2 HOURS BEFORE CT SCAN    Class: Historical    norgestimate-ethinyl estradiol (ORTHO-CYCLEN/SPRINTEC) 0.25-35 MG-MCG tablet    6/11/2019        Sig: Take one tablet by mouth daily. Take continuously.    Class: Historical    ondansetron (ZOFRAN) 8 MG tablet    2/12/2019        Class: Historical    ondansetron (ZOFRAN-ODT) 8 MG ODT tab  180 tablet 1 6/11/2019    [facility] [address] [place] [facility] - [place], [place] - [address]    Sig: Take 1 tablet (8 mg) by mouth every 8 hours as needed for nausea    Class: E-Prescribe    Route: Oral    order for DME  1 Device 0 10/15/2019    [facility] [address] [place] [address] [employer]    Sig: Equipment being ordered: 8 1/2 tall boot    Class: Local Print    order for DME  1 Device 0 10/8/2019        Sig: Equipment being ordered: RollAbout knee scooter x 3 months    Class: Local Print    order for DME  1 Device 0 6/4/2019    [address]    Sig: Equipment being ordered: Trilok brace 8 1/2 left lower extremity    Class: Local Print    order for DME (Ended)  1 Units 0 2/7/2019 2/7/2020       Sig: Roll-A-Bout Walker. Patient can use for 3 months    Class: Local Print    order for DME  1 Device 0 1/15/2019    [address]    Sig: Equipment being ordered: size 8 1/2 walking boot tall    Class: Local Print    order for DME  1 Device 0 1/8/2019    [address]    Sig: Equipment being ordered: RollAbout knee scooter    Class: Local Print    oxyCODONE (ROXICODONE) 5 MG tablet    5/10/2019        Class: Historical    Earliest Fill Date:  5/10/2019    oxyCODONE IR (ROXICODONE) 10 MG tablet    9/25/2019        Class: Historical    Earliest Fill Date: 9/25/2019    polyethylene glycol (MIRALAX/GLYCOLAX) powder            Sig: Take 1 capful by mouth 3 times daily    Class: Historical    Route: Oral    promethazine (PHENERGAN) 12.5 MG tablet  30 tablet 3 6/11/2019    [facility] [address] [place] [facility] - [place], [place] - [address]    Sig: Take 1-2 tablets (12.5-25 mg) by mouth every 6 hours as needed for nausea    Class: E-Prescribe    Route: Oral    rivaroxaban ANTICOAGULANT (XARELTO) 20 MG TABS tablet  90 tablet 0 1/9/2020    [facility] [address] [place] [address] [facility]    Sig: Take 1 tablet (20 mg) by mouth daily (with dinner)    Class: E-Prescribe    Route: Oral    sodium chloride 0.9% SOLN BOLUS  1000 mL 11 4/1/2019    [place], [place] - [address] [facility]    Sig: Inject 1,000 mLs into the vein daily as needed (IV abx and flushes)    Class: E-Prescribe    Route: Intravenous    sucralfate (CARAFATE) 1 GM/10ML suspension  1200 mL 2 5/17/2017    [place]  [address]    Sig: Take 10 mLs (1 g) by mouth 4 times daily    Class: E-Prescribe    Route: Oral    tiZANidine (ZANAFLEX) 4 MG tablet    3/20/2019        Class: Historical    triamcinolone (KENALOG) 0.5 % external ointment  15 g 3 7/25/2019    [facility] [address] [place] [address] [facility]    Sig: Apply 1 g topically 3 times daily as needed for irritation    Class: E-Prescribe    Route: Topical          ALLERGIES:   Amoxil [penicillins]; Bee venom; Bioflavonoids; Contrast dye; Diagnostic x-ray materials; Orange fruit [citrus]; Pineapple; Reglan [metoclopramide]; Ace inhibitors; Amitiza [lubiprostone]; Amoxicillin-pot clavulanate; Midazolam; No clinical screening - see comments; Tizanidine; Versed; Adhesive tape; Azithromycin; and Cephalexin    ROS:  An 11 point review of system was performed and pertinent negative and  positives are mentioned in HPI.        Physical Examination:   VITALS:   There were no vitals taken for this visit.   Bilateral upper extremity examination reveals no changes venous stasis, including skin changes, pigmentation, telangiectasias or swelling.  There is no significant difference in diameter of the forearm and upper arm.  No varicosities noted.  Capillary refill is within normal limits.  Symmetrical 5 out of 5 motor examination throughout.  No sensory deficits.  Arterial examination is also unremarkable as detailed below.     Brachial  Radial  Ulnar    Left  2/2 2/2  2/2    Right  2/2  2/2  2/2        Labs:    BMP RESULTS:  Lab Results   Component Value Date     12/11/2019    POTASSIUM 3.5 12/11/2019    CHLORIDE 105 12/11/2019    CO2 26 12/11/2019    ANIONGAP 7 12/11/2019    GLC 62 (L) 12/11/2019    BUN 9 12/31/2019    CR 0.63 12/11/2019    GFRESTIMATED >90 12/11/2019    GFRESTBLACK >90 12/11/2019    SHANKAR 8.5 12/11/2019        CBC RESULTS:  Lab Results   Component Value Date    WBC 4.9 12/31/2019    RBC 4.35 12/31/2019    HGB 11.9 12/31/2019    HCT 37.1 12/31/2019    MCV 85 12/31/2019    MCH 27.4 12/31/2019    MCHC 32.1 12/31/2019    RDW 14.8 12/31/2019     12/31/2019       INR/PTT:  Lab Results   Component Value Date    INR 0.99 11/06/2016       Diagnostic studies: Chest CT and ultrasound did not reveal a classic findings for thoracic outlet syndrome, however flattening of arterial waveforms with arm abduction is noted.    Assessment 24-year-old woman with longstanding history of bilateral arm tingling and swelling (left greater than right) with active lifestyle and history of weightlifting, with inconclusive noninvasive examination (CT and ultrasound) for possibility of thoracic outlet syndrome.  Given the clinical concern of the patient I am referring provider as well as the constellation of symptomatology, I do believe there would be a role for dynamic venography to evaluate possibility  of thoracic outlet syndrome, including hemodynamic pressure measurements with arm in different positions.  Given the dominance of her symptoms on the left, we will focus on the left upper extremity.  Plan left upper extremity venography with dynamic range of motion and measurement of pressure gradients across the thoracic outlet and different positions.    It was a pleasure to participate in Ms. Oropeza's care today.    I, Dr Wander Clarke, was present and performed the entirety of history and exam. I have documented the findings as well as the assessment and plan.    A total of 35 minutes was spent on this clinic visit, more than half was on direct counseling and coordination of the care.    Wander Clarke MD    Vascular and Interventional Radiolgy  [facility]  Patient Care Team:  Sonja Abreu APRN CNP as PCP - General (Nurse Practitioner)  Jennifer Acevedo MD as MD (Orthopedics)  Lilliana Miramontes APRN CNP as Nurse Practitioner (Nurse Practitioner)  Austen Marquez MD as MD (Rheumatology)  Umer Heaton MD as MD (Ophthalmology)  Suzy Harman MD as MD (Family Medicine - Sports Medicine)  Esau Elliott MD as MD (Dermatology)  Frederick Bender MD as MD (Physical Medicine and Rehab)  Vidya Bryson, RN as Clinic Care Coordinator (Physical Medicine and Rehab)  Marcelle Richardson PT as Physical Therapist (Physical Medicine and Rehab)  Cata Hurst NP as Nurse Practitioner (Nurse Practitioner Psych/Mental Health)  Sonja Abreu APRN CNP as Assigned PCP  HUSSEIN Lynn MD as MD (Cardiology)  SONJA ABREU

## 2020-02-28 DIAGNOSIS — M79.7 FIBROMYALGIA: ICD-10-CM

## 2020-02-28 DIAGNOSIS — L29.9 PRURITUS: ICD-10-CM

## 2020-02-28 NOTE — TELEPHONE ENCOUNTER
"Requested Prescriptions   Pending Prescriptions Disp Refills     amitriptyline (ELAVIL) 25 MG tablet [Pharmacy Med Name: AMITRIPTYLINE HCL 25 MG TAB] 90 tablet 0     Sig: TAKE 1 TABLET BY MOUTH AT BEDTIME  Last Written Prescription Date:  12/31/2019  Last Fill Quantity: 180,  # refills: 1   Last office visit: 12/31/2019 with prescribing provider:  12/31/2019   Future Office Visit:   Next 5 appointments (look out 90 days)    Mar 03, 2020 10:00 AM CST  Return Visit with Ernestina Patel [place] [address] [place] [id number]  [phone]   Mar 17, 2020 10:00 AM CDT  Return Visit with Ernestina Patel [place] [address] [place] [id number]  [phone]   Mar 31, 2020 11:00 AM CDT  Return Visit with Ernestina Patel [place] [address] [place] [phone]  [phone]              Tricyclic Agents ( Annual appt and no PHQ9) Passed - 2/28/2020  1:53 AM        Passed - Blood Pressure under 140/90 in past 12 mos     BP Readings from Last 3 Encounters:   02/25/20 117/70   02/17/20 124/84   01/22/20 119/82                 Passed - Recent (12 mo) or future (30 days) visit within authorizing provider's specialty     Patient has had an office visit with the authorizing provider or a provider within the authorizing providers department within the previous 12 mos or has a future within next 30 days. See \"Patient Info\" tab in inbasket, or \"Choose Columns\" in Meds & Orders section of the refill encounter.              Passed - Medication is active on med list        Passed - Patient is age 18 or older        Passed - Patient is not pregnant        Passed - No positive pregnancy test on record in past 12 mos        "

## 2020-02-28 NOTE — TELEPHONE ENCOUNTER
Prescription approved per [facility] Refill Protocol.  Dione Nur RN on 2/28/2020 at 4:00 PM

## 2020-03-02 DIAGNOSIS — K59.04 CHRONIC IDIOPATHIC CONSTIPATION: ICD-10-CM

## 2020-03-02 DIAGNOSIS — F51.5 NIGHTMARES: ICD-10-CM

## 2020-03-02 NOTE — TELEPHONE ENCOUNTER
cyproheptadine (PERIACTIN) 4 MG tablet      Last Written Prescription Date:  06/11/2019  Last Fill Quantity: 180,   # refills: 2  Last Office Visit: 12/31/2019  Future Office visit:    Next 5 appointments (look out 90 days)    Mar 03, 2020 10:00 AM CST  Return Visit with Ernestina Patel [facility]  [address] [facility]  [facility] [id number] [phone]   Mar 17, 2020 10:00 AM CDT  Return Visit with Ernestina Patel [facility]) [facility]  [address] [facility]  [facility] [id number] [phone]   Mar 31, 2020 11:00 AM CDT  Return Visit with Ernestina Patel [facility]  [address] [place] [id number] [phone]           Routing refill request to provider for review/approval because:  Drug not on the [facility], [facility] or  Health refill protocol or controlled substance    LINZESS 290 MCG capsule      Last Written Prescription Date:  12/19/2019  Last Fill Quantity: 90,   # refills: 0  Last Office Visit: 12/31/2019  Future Office visit:    Next 5 appointments (look out 90 days)    Mar 03, 2020 10:00 AM CST  Return Visit with Ernestina Patel [facility] ([facility]  [address] [facility]  [facility] [id number] [phone]   Mar 17, 2020 10:00 AM CDT  Return Visit with Ernestina Patel [facility] ([facility]) [facility]  [address] [facility]  [facility] [id number] [phone]   Mar 31, 2020 11:00 AM CDT  Return Visit with Ernestina Patel [facility]  [address] [facility]  [facility] [phone]           Routing refill request to provider for review/approval because:  Drug not on the [facility], [facility] or  [facility] refill protocol or controlled substance

## 2020-03-03 ENCOUNTER — OFFICE VISIT (OUTPATIENT)
Dept: PSYCHOLOGY | Facility: CLINIC | Age: 25
End: 2020-03-03
Payer: COMMERCIAL

## 2020-03-03 DIAGNOSIS — F41.1 GAD (GENERALIZED ANXIETY DISORDER): Primary | ICD-10-CM

## 2020-03-03 DIAGNOSIS — F32.A DEPRESSION: ICD-10-CM

## 2020-03-03 PROCEDURE — 90834 PSYTX W PT 45 MINUTES: CPT | Performed by: COUNSELOR

## 2020-03-03 RX ORDER — CYPROHEPTADINE HYDROCHLORIDE 4 MG/1
8 TABLET ORAL AT BEDTIME
Qty: 180 TABLET | Refills: 1 | Status: SHIPPED | OUTPATIENT
Start: 2020-03-03 | End: 2022-10-28

## 2020-03-03 RX ORDER — LINACLOTIDE 290 UG/1
CAPSULE, GELATIN COATED ORAL
Qty: 90 CAPSULE | Refills: 0 | Status: SHIPPED | OUTPATIENT
Start: 2020-03-03

## 2020-03-03 ASSESSMENT — ANXIETY QUESTIONNAIRES
6. BECOMING EASILY ANNOYED OR IRRITABLE: NOT AT ALL
IF YOU CHECKED OFF ANY PROBLEMS ON THIS QUESTIONNAIRE, HOW DIFFICULT HAVE THESE PROBLEMS MADE IT FOR YOU TO DO YOUR WORK, TAKE CARE OF THINGS AT HOME, OR GET ALONG WITH OTHER PEOPLE: SOMEWHAT DIFFICULT
GAD7 TOTAL SCORE: 6
7. FEELING AFRAID AS IF SOMETHING AWFUL MIGHT HAPPEN: NOT AT ALL
1. FEELING NERVOUS, ANXIOUS, OR ON EDGE: SEVERAL DAYS
5. BEING SO RESTLESS THAT IT IS HARD TO SIT STILL: SEVERAL DAYS
3. WORRYING TOO MUCH ABOUT DIFFERENT THINGS: SEVERAL DAYS
2. NOT BEING ABLE TO STOP OR CONTROL WORRYING: SEVERAL DAYS
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
## 2020-03-04 ASSESSMENT — ANXIETY QUESTIONNAIRES: GAD7 TOTAL SCORE: 6

## 2020-03-30 DIAGNOSIS — F95.9 TIC: ICD-10-CM

## 2020-03-31 ENCOUNTER — VIRTUAL VISIT (OUTPATIENT)
Dept: PSYCHOLOGY | Facility: CLINIC | Age: 25
End: 2020-03-31
Payer: COMMERCIAL

## 2020-03-31 ENCOUNTER — MYC MEDICAL ADVICE (OUTPATIENT)
Dept: PSYCHOLOGY | Facility: CLINIC | Age: 25
End: 2020-03-31

## 2020-03-31 DIAGNOSIS — F41.1 GAD (GENERALIZED ANXIETY DISORDER): Primary | ICD-10-CM

## 2020-03-31 DIAGNOSIS — F32.A DEPRESSION: ICD-10-CM

## 2020-03-31 PROCEDURE — 90834 PSYTX W PT 45 MINUTES: CPT | Mod: TEL | Performed by: COUNSELOR

## 2020-03-31 RX ORDER — GUANFACINE 1 MG/1
TABLET ORAL
Qty: 180 TABLET | Refills: 5 | Status: SHIPPED | OUTPATIENT
Start: 2020-03-31 | End: 2020-08-25
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
## 2020-04-14 ENCOUNTER — OFFICE VISIT - HEALTHEAST (OUTPATIENT)
Dept: BEHAVIORAL HEALTH | Facility: CLINIC | Age: 25
End: 2020-04-14

## 2020-04-14 DIAGNOSIS — F41.1 GENERALIZED ANXIETY DISORDER: ICD-10-CM

## 2020-04-14 ASSESSMENT — ANXIETY QUESTIONNAIRES
3. WORRYING TOO MUCH ABOUT DIFFERENT THINGS: SEVERAL DAYS
5. BEING SO RESTLESS THAT IT IS HARD TO SIT STILL: MORE THAN HALF THE DAYS
4. TROUBLE RELAXING: MORE THAN HALF THE DAYS
1. FEELING NERVOUS, ANXIOUS, OR ON EDGE: MORE THAN HALF THE DAYS
GAD7 TOTAL SCORE: 9
IF YOU CHECKED OFF ANY PROBLEMS ON THIS QUESTIONNAIRE, HOW DIFFICULT HAVE THESE PROBLEMS MADE IT FOR YOU TO DO YOUR WORK, TAKE CARE OF THINGS AT HOME, OR GET ALONG WITH OTHER PEOPLE: SOMEWHAT DIFFICULT
6. BECOMING EASILY ANNOYED OR IRRITABLE: SEVERAL DAYS
7. FEELING AFRAID AS IF SOMETHING AWFUL MIGHT HAPPEN: NOT AT ALL
2. NOT BEING ABLE TO STOP OR CONTROL WORRYING: SEVERAL DAYS

## 2020-04-14 ASSESSMENT — PATIENT HEALTH QUESTIONNAIRE - PHQ9: SUM OF ALL RESPONSES TO PHQ QUESTIONS 1-9: 7

## 2020-04-22 ENCOUNTER — VIRTUAL VISIT (OUTPATIENT)
Dept: NEUROLOGY | Facility: CLINIC | Age: 25
End: 2020-04-22
Payer: COMMERCIAL

## 2020-04-22 DIAGNOSIS — G43.019 INTRACTABLE MIGRAINE WITHOUT AURA AND WITHOUT STATUS MIGRAINOSUS: ICD-10-CM

## 2020-04-22 DIAGNOSIS — R60.1 GENERALIZED EDEMA: ICD-10-CM

## 2020-04-22 DIAGNOSIS — G54.0 THORACIC OUTLET SYNDROME: Primary | ICD-10-CM

## 2020-04-22 ASSESSMENT — PAIN SCALES - GENERAL: PAINLEVEL: MODERATE PAIN (5)

## 2020-04-22 NOTE — PROGRESS NOTES
"Samara Oropeza is a 24 year old female who is being evaluated via a billable video visit.      The patient has been notified of following:     \"This video visit will be conducted via a call between you and your physician/provider. We have found that certain health care needs can be provided without the need for an in-person physical exam.  This service lets us provide the care you need with a video conversation.  If a prescription is necessary we can send it directly to your pharmacy.  If lab work is needed we can place an order for that and you can then stop by our lab to have the test done at a later time.    Video visits are billed at different rates depending on your insurance coverage.  Please reach out to your insurance provider with any questions.    If during the course of the call the physician/provider feels a video visit is not appropriate, you will not be charged for this service.\"    Patient has given verbal consent for Video visit? YES     How would you like to obtain your AVS? [place]     Patient would like the video invitation sent by: TEXT: [phone]    Will anyone else be joining your video visit?   NO   {If patient encounters technical issues they should call [phone] :[id number]}    Video Start Time: 11:00 AM     Video-Visit Details    Type of service:  Video Visit    Video End Time (time video stopped): ***    Originating Location (pt. Location): {video visit patient location:[id number]::\"Home\"}    Distant Location (provider location):  [facility] NEUROLOGY     Mode of Communication:  Video Conference via [employer]      {signature options:[id number]}        "

## 2020-04-22 NOTE — PROGRESS NOTES
"Samara Oropeza is a 24 year old female who is being evaluated via a billable video visit.      The patient has been notified of following:     \"This video visit will be conducted via a call between you and your physician/provider. We have found that certain health care needs can be provided without the need for an in-person physical exam.  This service lets us provide the care you need with a video conversation.  If a prescription is necessary we can send it directly to your pharmacy.  If lab work is needed we can place an order for that and you can then stop by our lab to have the test done at a later time.    Video visits are billed at different rates depending on your insurance coverage.  Please reach out to your insurance provider with any questions.    If during the course of the call the physician/provider feels a video visit is not appropriate, you will not be charged for this service.\"    Patient has given verbal consent for Video visit? YES      How [place] you [place]      Patient would like the video invitation sent by: TEXT: [phone]     Will anyone else be joining your video visit?   NO        Video Start Time: 11:00 AM     The clinical encounter between myself and Samara Oropeza was performed utilizing a real-time video connection between my location and the patient's location, which was confirmed during the encounter.  Consent was obtained from the patient to perform this clinical encounter via telehealth modality.     [facility]    Neurology Consult    4/22/2020      Samara Oropeza MRN# [id number]   YOB: 1995 Age: 24 year old      Primary care provider:   Sonja Abreu    Requesting provider:    Follow-up 1-     Reason for Consult:   Headache, dizziness, spells      HISTORY OF PRESENT ILLNESS:  Samara Oropeza is a 24 year old female with a past medical history of chronic headaches, " bilateral arm paresthesias, limb swelling, prior suspicion for TOS who presents for follow-up. Her prior work-up with a chest CT with contrast was complicated by a blood clot in the left basilic vein for which she had to be treated with Xarelto.      Since the last visit, she has had a left arm venogram that was negative for clots and for any pressure gradients to confirm thoracic outlet syndrome.  She is still on Lasix 20mg TID for the swelling in her arms and legs.  She is still in the ankle cast from prior injury and there is still swelling in it.  She fell on it recently.  It is not open, anymore, however. She has a little lightheadedness.  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      She had her last Botox injection for headaches on 1-8-20.  She had some trouble holding her head up for about a week afterwards but this recovered.  Her next one is scheduled for 5-5-20.  She was headache free for about a month after the last set of injections, which is better than the set from October. The headaches started coming back a couple times a week after that and then became more progressive.  She is taking Zofran 4x a week, which has been helpful for nausea.  She does not take tizanidine because it gave her a bad reaction in the past that she can't recall now.     She has had headaches about 5 days in the last 7 days.  She is not taking anything for the headaches.  She is just doing ice for her headaches and is wary about using NSAIDs because of her current treatment with the blood thinner.  She is supposed to be done with the Xarelto soon since she started this in January.     She notes continued episodes of chest pain and breathing difficulties along with tachycardia and arm shakiness. There is no headache associated with these episodes.  There is no loss of consciousness.  She has had them for about 5 years now.  She finds that 1mg of lorazepam can reduce  the spells to 30-minutes.  Otherwise, they can go for 2.5 hours.  She is concerned that she does not have enough lorazepam to treat the roughly 3 episodes that she has a week.  They generally occur without a trigger and are not related to exertion.      She does note a long standing history of tachycardia.  She is currently on amitriptyline 25 mg.  She was on a higher dose in the past but reducing the amitriptyline did not affect her tachycardia.  She is been on citalopram for a few months. She still notes having chest pressure on the left side that radiates to her head when she pushes on the chest.    PAST MEDICAL HISTORY:  Past Medical History:   Diagnosis Date     Anemia      Anxiety      Arthritis      Behcet's disease (H)      Cervical adenitis May 2010     Chronic abdominal pain      Constipation, chronic 1994     Fibromyalgia      Gastro-oesophageal reflux disease      Gastroparesis      Irregular heart beat     tachycardia, has had workup     Migraines      Neuromuscular disorder (H)     fibramyalgia     Palpitations      PONV (postoperative nausea and vomiting)      Seizure (H)      Seizures (H)     unknown etiology     Syncope      Tourette's        SOCIAL HISTORY: Single, never smoker    ALLERGIES:  Allergies   Allergen Reactions     Amoxil [Penicillins] Rash     Dad unsure of reaction.     Bee Venom Anaphylaxis     Bioflavonoids Anaphylaxis     Citrus Anaphylaxis     All [place]     Contrast Dye Rash     Contrast Media Ready-Box [facility] [place].; Contrast Media Ready-Box [facility] [place].  NOTE: this is a contrast media oral with iodine. Premedicate with methylpred standard for IV contrast, request barium contrast for oral contrast.     Diagnostic X-Ray Materials Hives and Rash     Contrast Media Ready-Box [facility] [place] [facility] [place] 04[place]09[place]2014.; Contrast Media Ready-Box [facility] [place] [facility] [place] 04/09/2014.  NOTE: this is a contrast media oral with iodine. Premedicate with methylpred standard for IV contrast, request barium contrast for oral  contrast.     Pineapple Anaphylaxis, Difficulty breathing and Rash     Reglan [Metoclopramide] Other (See Comments)     IV dose only, in ER, rapid heart rate.     Ace Inhibitors      Difficulty in breathing and GI upset     Amitiza [Lubiprostone] Nausea and Vomiting     Amoxicillin-Pot Clavulanate      Midazolam Unknown     parent states that when pt takes this medication, she wakes up being very violent .     No Clinical Screening - See Comments      Bleech/ chest tightness, itchy throat, swollen tongue, hives     Tizanidine Other (See Comments)     Confusion, back pain, photophobia, abdominal pain, shaking, anxious       Versed      Coming out of pelvic exam at age of 5, was kicking and screaming when coming out of the versed.     Adhesive Tape Rash     Azithromycin Hives and Rash     Cephalexin Itching and Rash        MEDICATIONS:  Current Outpatient Medications:      albuterol (PROAIR HFA/PROVENTIL HFA/VENTOLIN HFA) 108 (90 BASE) MCG/ACT Inhaler, Inhale 2 puffs into the lungs every 6 hours as needed for shortness of breath / dyspnea or wheezing, Disp: 1 Inhaler, Rfl: 1     amitriptyline (ELAVIL) 25 MG tablet, TAKE 1 TABLET BY MOUTH AT BEDTIME, Disp: 90 tablet, Rfl: 0     Apremilast (OTEZLA) 10 & 20 & 30 MG TBPK, Follow package directions, Disp: , Rfl:      artificial tears OINT ophthalmic ointment, 0.5 inch strip each eye at night, Disp: 1 Tube, Rfl: 11     benzocaine (TOPICALE XTRA) 20 % GEL, Apply as needed locally to mouth or nasal ulcers for pain; 4 times daily as needed, Disp: 30 g, Rfl: 1     betamethasone valerate (VALISONE) 0.1 % cream, Apply topically 2 times daily as needed , Disp: , Rfl:      bisacodyl (DULCOLAX) 5 MG EC tablet, Take 2 tablets (10 mg) by mouth daily as needed for constipation, Disp: 30 tablet, Rfl: 0     citalopram (CELEXA) 20 MG tablet, TAKE 1 TABLET BY MOUTH EVERY DAY, Disp: 90 tablet, Rfl: 1     clobetasol (TEMOVATE) 0.05 % cream, Apply topically 2 times daily, Disp: 60 g, Rfl:  0     colchicine (COLCYRS) 0.6 MG tablet, TAKE 1 TABLET (0.6 MG) BY MOUTH 2 TIMES DAILY, Disp: 180 tablet, Rfl: 1     cyproheptadine (PERIACTIN) 4 MG tablet, TAKE 2 TABLETS (8 MG) BY MOUTH AT BEDTIME FOR NIGHTMARES, Disp: 180 tablet, Rfl: 1     dicyclomine (BENTYL) 20 MG tablet, Take 1 tablet (20 mg) by mouth 4 times daily as needed, Disp: 120 tablet, Rfl: 3     diphenhydrAMINE (BENADRYL) 25 MG tablet, Take 1 tablet (25 mg) by mouth 3 times daily as needed (itching), Disp: 40 tablet, Rfl: 1     EPINEPHrine (EPIPEN 2-EAMON) 0.3 MG/0.3ML injection, Inject 0.3 mLs (0.3 mg) into the muscle as needed for anaphylaxis, Disp: 0.6 mL, Rfl: 3     fluconazole (DIFLUCAN) 150 MG tablet, , Disp: , Rfl:      furosemide (LASIX) 20 MG tablet, Take 1 tablet (20 mg) by mouth daily, Disp: 60 tablet, Rfl: 3     furosemide (LASIX) 40 MG tablet, Take 1 tablet (40 mg) by mouth 2 times daily, Disp: 60 tablet, Rfl: 3     gabapentin (NEURONTIN) 300 MG capsule, TAKE 1 CAPSULE BY MOUTH THREE TIMES A DAY, Disp: 90 capsule, Rfl: 1     guanFACINE (TENEX) 1 MG tablet, TAKE 3 TABLETS (3 MG) BY MOUTH TWICE DAILY IN THE MORNING AND EVENING., Disp: 180 tablet, Rfl: 5     HYDROcodone-acetaminophen (NORCO) 5-325 MG tablet, Take 1-2 tablets by mouth, Disp: , Rfl:      hydrocortisone 1 % CREA cream, Place rectally 2 times daily as needed for itching, Disp: 28.35 g, Rfl: 1     hydrOXYzine (ATARAX) 25 MG tablet, Take 1-2 tablets every 4-6 hours as needed for pain control., Disp: 30 tablet, Rfl: 0     hyoscyamine (ANASPAZ/LEVSIN) 0.125 MG tablet, Take 1-2 tablets (125-250 mcg) by mouth every 4 hours as needed for cramping, Disp: 40 tablet, Rfl: 1     hypromellose (GENTEAL) 0.3 % SOLN, 1 drop every hour as needed for dry eyes , Disp: , Rfl:      Lactase 9000 units CHEW, , Disp: , Rfl:      lactulose 20 GM/30ML SOLN, Take 30 mLs by mouth 3 times daily as needed (for constipation), Disp: 300 mL, Rfl: 3     LINZESS 290 MCG capsule, TAKE 1 CAPSULE BY MOUTH EVERY DAY  "IN THE MORNING BEFORE BREAKFAST, Disp: 90 capsule, Rfl: 0     LORazepam (ATIVAN) 0.5 MG tablet, Take 1 tablet (0.5 mg) by mouth every 6 hours as needed for anxiety, Disp: 10 tablet, Rfl: 0     methylPREDNISolone (MEDROL) 32 MG tablet, TAKE 1 TABLET BY MOUTH 12 HOURS BEFORE CT SCAN AND AGAIN 2 HOURS BEFORE CT SCAN, Disp: , Rfl: 0     ondansetron (ZOFRAN-ODT) 8 MG ODT tab, Take 1 tablet (8 mg) by mouth every 8 hours as needed for nausea, Disp: 180 tablet, Rfl: 1     oxyCODONE (ROXICODONE) 5 MG tablet, , Disp: , Rfl:      polyethylene glycol (MIRALAX/GLYCOLAX) powder, Take 1 capful by mouth 3 times daily, Disp: , Rfl:      promethazine (PHENERGAN) 12.5 MG tablet, Take 1-2 tablets (12.5-25 mg) by mouth every 6 hours as needed for nausea, Disp: 30 tablet, Rfl: 3     sucralfate (CARAFATE) 1 GM/10ML suspension, Take 10 mLs (1 g) by mouth 4 times daily, Disp: 1200 mL, Rfl: 2     tiZANidine (ZANAFLEX) 4 MG tablet, , Disp: , Rfl:      triamcinolone (KENALOG) 0.5 % external ointment, Apply 1 g topically 3 times daily as needed for irritation, Disp: 15 g, Rfl: 3     XARELTO ANTICOAGULANT 20 MG TABS tablet, TAKE 1 TABLET BY MOUTH DAILY WITH DINNER, Disp: 30 tablet, Rfl: 2    PHYSICAL EXAM:  Vitals:    BP Readings from Last 1 Encounters:   02/25/20 117/70     Pulse Readings from Last 1 Encounters:   02/25/20 106     Wt Readings from Last 1 Encounters:   01/14/20 71.7 kg (158 lb)     Ht Readings from Last 1 Encounters:   12/26/19 1.6 m (5' 3\")     Estimated body mass index is 27.99 kg/m  as calculated from the following:    Height as of 12/26/19: 1.6 m (5' 3\").    Weight as of 1/14/20: 71.7 kg (158 lb).    Temp Readings from Last 1 Encounters:   02/25/20 98.1  F (36.7  C) (Oral)       Wt Readings from Last 3 Encounters:   01/14/20 71.7 kg (158 lb)   12/26/19 71.7 kg (158 lb)   10/15/19 71.2 kg (157 lb)       General: Patient is no apparent distress    Mental Status: Alert and oriented to person, place, time, and situation.  Able " to provide a good history.     Cranial Nerves: Visual fields full to confrontation.  Extraocular movements full.  Normal conversational hearing.  Face symmetric with normal movements. Tongue midline.  Normal shoulder elevation.      DATA:  All available and relevant labs, imaging, and other procedures were reviewed personally.     IR Upper Extremity Venogram Left 2-25-20  Narrative: Procedures:  1. Ultrasound guidance for vascular access.  2. Left upper extremity dynamic venogram.  3. Left subclavian and axillary veins dynamic pressure measurements.    IMPRESSION:     Left upper extremity dynamic venography and pressure measurements  demonstrate no significant stenosis or hemodynamic pressure gradient  to suggest thoracic outlet syndrome.    THOMAS TORRES MD    Last Labs:  [facility]  Recent Labs   Lab Test 02/25/20  1050 12/31/19  1226 12/11/19  1728 09/26/19  1147 09/17/19  1058 05/21/19  1545 05/14/19  1340 05/09/19  0707  04/30/19  1650 04/26/19  2314  04/09/19  1115  04/01/19  0744 04/01/19  0601 03/31/19  0514  11/26/16  1440   NA  --   --  138 136 139  --   --  138   < >  --  139  --  140  --  139  --  140   < > 139   POTASSIUM 3.9  --  3.5 3.6 3.6  --   --  3.9   < >  --  4.3  --  4.0  --  4.0  --  3.8   < > 3.3*   CHLORIDE  --   --  105 105 109  --   --  105   < >  --  106  --  108  --  110*  --  110*   < > 108   CO2  --   --  26 23 20  --   --  25   < >  --  26  --  26  --  22  --  21   < > 20   ANIONGAP  --   --  7 8 10  --   --  8   < >  --  7  --  6  --  7  --  9   < > 11   GLC  --   --  62* 113* 87  --   --  121*   < >  --  147*  --  166*  --  102*  --  114*   < > 95   BUN  --  9 12 6* 8 6* 12 10   < > 11 13   < > 10   < > 10  --  12   < > 12   CR 0.64  --  0.63 0.72 0.72 0.63 0.60 0.72   < > 0.78 1.02   < > 1.03   < > 1.45*  --  1.62*   < > 1.03   GFRESTIMATED >90  --  >90 >90 >90 >90 >90 >90   < > >90 77   < > 76   < > 50*  --  44*   < > 67   GFRESTBLACK >90  --  >90 >90 >90 >90 >90 >90   < > >90 89   < > 88    < > 58*  --  51*   < > 81   SHANKAR  --   --  8.5 9.1 9.4  --   --  8.6   < >  --  8.3*  --  8.2*  --  8.2*  --  8.2*   < > 8.6   MAG  --   --   --   --   --   --   --   --   --   --   --   --   --   --  1.9 2.0 1.5*  --  1.7   PROTTOTAL  --   --   --   --  6.9  --   --  6.7*  --   --  6.4*  --  6.2*  --   --   --   --    < > 7.2   ALBUMIN  --   --   --   --  3.6  --   --  3.3*  --   --  3.2*  --  3.0*  --   --   --   --    < > 3.9   BILITOTAL  --   --   --   --  0.5  --   --  0.3  --   --  0.2  --  0.4  --   --   --   --    < > 0.7   ALKPHOS  --   --   --   --  60  --   --  64  --   --  84  --  101  --   --   --   --    < > 107   AST  --   --   --   --  25 21 20 12  --  14 18   < > 25   < >  --   --   --    < > 38   ALT  --   --   --   --  23  --   --  20  --  19 21   < > 24  --   --   --   --    < > 46    < > = values in this interval not displayed.     CBC  Recent Labs   Lab Test 02/25/20  1050 12/31/19  1226 12/11/19  1728 09/26/19  1147   HGB 10.4* 11.9 10.8* 11.6*   WBC 3.7* 4.9 4.9 7.9   RBC 3.98 4.35 3.98 4.31   HCT 34.0* 37.1 34.8* 38.6   MCV 85 85 87 90   MCH 26.1* 27.4 27.1 26.9   MCHC 30.6* 32.1 31.0* 30.1*   RDW 13.5 14.8 13.7 14.1    271 230 275     INR  Recent Labs   Lab Test 02/25/20  1050 11/06/16  1341 05/06/15  0220   INR 1.06 0.99 1.03     IMPRESSIONS:  Samara Oropeza is a 24 year old female with PMH significant of chronic migraines, bilateral arm paresthesias, baseline tachycardia, and unexplained spells of chest pain, shortness of breath, and shakiness.  Evaluation for vascular thoracic outlet syndrome has been negative but she might have neurogenic TOS.  She has persistent edema in the arms and legs.    Recommendations:  1. Botox as scheduled with PM&R  2. We discussed potentially trying a CGRP inhibitor if the response to Botox does not progressively get longer  3. Physical therapy for be upper body.  She is requesting a therapist by the name of Maddy at [facility] I will place  that request  4. Raise Lasix to 40 mg in the morning, 20 mg midday, 20 mg in the late afternoon.  5. F/U 2 months    25-minutes were spent in evaluation, examination, and counseling in real time with more than 50% spent in counseling and coordination of care.  After a review of the patient s situation, this visit was changed from an in-person visit to a video visit to reduce the risk of COVID-19 exposure.     The patient is being evaluated via a billable video visit.    Video-Visit Details    Type of service:  Video Visit    Video End Time (time video stopped):11:35am    Originating Location (pt. Location): Home    Distant Location (provider location):  [facility] NEUROLOGY     Mode of Communication:  Video Conference via [facility]  Darrius JAUREGUI Cha, MD

## 2020-04-23 RX ORDER — FUROSEMIDE 20 MG
TABLET ORAL
Qty: 120 TABLET | Refills: 3 | Status: SHIPPED | OUTPATIENT
Start: 2020-04-23 | End: 2020-07-27

## 2020-04-28 ENCOUNTER — OFFICE VISIT - HEALTHEAST (OUTPATIENT)
Dept: BEHAVIORAL HEALTH | Facility: CLINIC | Age: 25
End: 2020-04-28

## 2020-04-28 DIAGNOSIS — F41.1 GENERALIZED ANXIETY DISORDER: ICD-10-CM

## 2020-05-05 ENCOUNTER — OFFICE VISIT (OUTPATIENT)
Dept: PHYSICAL MEDICINE AND REHAB | Facility: CLINIC | Age: 25
End: 2020-05-05
Payer: COMMERCIAL

## 2020-05-05 VITALS — TEMPERATURE: 98.2 F

## 2020-05-05 DIAGNOSIS — G43.719 CHRONIC MIGRAINE WITHOUT AURA, INTRACTABLE, WITHOUT STATUS MIGRAINOSUS: Primary | ICD-10-CM

## 2020-05-05 RX ADMIN — BUPIVACAINE HYDROCHLORIDE 10 MG: 2.5 INJECTION, SOLUTION INFILTRATION; PERINEURAL at 12:13

## 2020-05-05 NOTE — LETTER
5/5/2020       RE: Samara Oropeza  [address]  [address]  [place] [address]     Dear Colleague,    Thank you for referring your patient, Samara Oropeza, to the [facility] PHYSICAL MEDICINE AND REHABILITATION at [facility]. Please see a copy of my visit note below.    BOTULINUM TOXIN PROCEDURE - HEADACHE - NOTE    Chief Complaint   Patient presents with     Headache     Botox injections     Temp 98.2  F (36.8  C) (Oral)      Current Outpatient Medications:      albuterol (PROAIR HFA/PROVENTIL HFA/VENTOLIN HFA) 108 (90 BASE) MCG/ACT Inhaler, Inhale 2 puffs into the lungs every 6 hours as needed for shortness of breath / dyspnea or wheezing, Disp: 1 Inhaler, Rfl: 1     amitriptyline (ELAVIL) 25 MG tablet, TAKE 1 TABLET BY MOUTH AT BEDTIME, Disp: 90 tablet, Rfl: 0     Apremilast (OTEZLA) 10 & 20 & 30 MG TBPK, Follow package directions, Disp: , Rfl:      artificial tears OINT ophthalmic ointment, 0.5 inch strip each eye at night, Disp: 1 Tube, Rfl: 11     benzocaine (TOPICALE XTRA) 20 % GEL, Apply as needed locally to mouth or nasal ulcers for pain; 4 times daily as needed, Disp: 30 g, Rfl: 1     betamethasone valerate (VALISONE) 0.1 % cream, Apply topically 2 times daily as needed , Disp: , Rfl:      bisacodyl (DULCOLAX) 5 MG EC tablet, Take 2 tablets (10 mg) by mouth daily as needed for constipation, Disp: 30 tablet, Rfl: 0     citalopram (CELEXA) 20 MG tablet, TAKE 1 TABLET BY MOUTH EVERY DAY, Disp: 90 tablet, Rfl: 1     clobetasol (TEMOVATE) 0.05 % cream, Apply topically 2 times daily, Disp: 60 g, Rfl: 0     colchicine (COLCYRS) 0.6 MG tablet, TAKE 1 TABLET (0.6 MG) BY MOUTH 2 TIMES DAILY, Disp: 180 tablet, Rfl: 1     cyproheptadine (PERIACTIN) 4 MG tablet, TAKE 2 TABLETS (8 MG) BY MOUTH AT BEDTIME FOR NIGHTMARES, Disp: 180 tablet, Rfl: 1     dicyclomine (BENTYL) 20 MG tablet, Take 1 tablet (20 mg) by mouth 4 times daily as needed, Disp: 120 tablet, Rfl: 3      diphenhydrAMINE (BENADRYL) 25 MG tablet, Take 1 tablet (25 mg) by mouth 3 times daily as needed (itching), Disp: 40 tablet, Rfl: 1     EPINEPHrine (EPIPEN 2-EAMON) 0.3 MG/0.3ML injection, Inject 0.3 mLs (0.3 mg) into the muscle as needed for anaphylaxis, Disp: 0.6 mL, Rfl: 3     fluconazole (DIFLUCAN) 150 MG tablet, , Disp: , Rfl:      furosemide (LASIX) 20 MG tablet, 40mg in the AM, 20mg in mid-day, 20mg late afternoon, Disp: 120 tablet, Rfl: 3     furosemide (LASIX) 20 MG tablet, Take 1 tablet (20 mg) by mouth daily, Disp: 60 tablet, Rfl: 3     furosemide (LASIX) 40 MG tablet, Take 1 tablet (40 mg) by mouth 2 times daily, Disp: 60 tablet, Rfl: 3     gabapentin (NEURONTIN) 300 MG capsule, TAKE 1 CAPSULE BY MOUTH THREE TIMES A DAY, Disp: 90 capsule, Rfl: 1     guanFACINE (TENEX) 1 MG tablet, TAKE 3 TABLETS (3 MG) BY MOUTH TWICE DAILY IN THE MORNING AND EVENING., Disp: 180 tablet, Rfl: 5     HYDROcodone-acetaminophen (NORCO) 5-325 MG tablet, Take 1-2 tablets by mouth, Disp: , Rfl:      hydrocortisone 1 % CREA cream, Place rectally 2 times daily as needed for itching, Disp: 28.35 g, Rfl: 1     hydrOXYzine (ATARAX) 25 MG tablet, Take 1-2 tablets every 4-6 hours as needed for pain control., Disp: 30 tablet, Rfl: 0     hyoscyamine (ANASPAZ/LEVSIN) 0.125 MG tablet, Take 1-2 tablets (125-250 mcg) by mouth every 4 hours as needed for cramping, Disp: 40 tablet, Rfl: 1     hypromellose (GENTEAL) 0.3 % SOLN, 1 drop every hour as needed for dry eyes , Disp: , Rfl:      Lactase 9000 units CHEW, , Disp: , Rfl:      lactulose 20 GM/30ML SOLN, Take 30 mLs by mouth 3 times daily as needed (for constipation), Disp: 300 mL, Rfl: 3     lidocaine (LMX4) 4 % external cream, Apply topically once as needed for mild pain, Disp: 120 g, Rfl: 1     lidocaine (LMX4) 4 % external cream, Apply 1 Application topically once as needed for mild pain, Disp: , Rfl:      LINZESS 290 MCG capsule, TAKE 1 CAPSULE BY MOUTH EVERY DAY IN THE MORNING BEFORE  BREAKFAST, Disp: 90 capsule, Rfl: 0     LORazepam (ATIVAN) 0.5 MG tablet, Take 1 tablet (0.5 mg) by mouth every 6 hours as needed for anxiety, Disp: 10 tablet, Rfl: 0     methylPREDNISolone (MEDROL) 32 MG tablet, TAKE 1 TABLET BY MOUTH 12 HOURS BEFORE CT SCAN AND AGAIN 2 HOURS BEFORE CT SCAN, Disp: , Rfl: 0     ondansetron (ZOFRAN-ODT) 8 MG ODT tab, Take 1 tablet (8 mg) by mouth every 8 hours as needed for nausea, Disp: 180 tablet, Rfl: 1     order for DME, Equipment being ordered: Trilok brace 8 1/2 left lower extremity, Disp: 1 Device, Rfl: 0     order for DME, Equipment being ordered: size 8 1/2 walking boot tall, Disp: 1 Device, Rfl: 0     oxyCODONE (ROXICODONE) 5 MG tablet, , Disp: , Rfl:      polyethylene glycol (MIRALAX/GLYCOLAX) powder, Take 1 capful by mouth 3 times daily, Disp: , Rfl:      promethazine (PHENERGAN) 12.5 MG tablet, Take 1-2 tablets (12.5-25 mg) by mouth every 6 hours as needed for nausea, Disp: 30 tablet, Rfl: 3     sucralfate (CARAFATE) 1 GM/10ML suspension, Take 10 mLs (1 g) by mouth 4 times daily, Disp: 1200 mL, Rfl: 2     tiZANidine (ZANAFLEX) 4 MG tablet, , Disp: , Rfl:      triamcinolone (KENALOG) 0.5 % external ointment, Apply 1 g topically 3 times daily as needed for irritation, Disp: 15 g, Rfl: 3     XARELTO ANTICOAGULANT 20 MG TABS tablet, TAKE 1 TABLET BY MOUTH DAILY WITH DINNER, Disp: 30 tablet, Rfl: 2     Allergies   Allergen Reactions     Amoxil [Penicillins] Rash     Dad unsure of reaction.     Bee Venom Anaphylaxis     Bioflavonoids Anaphylaxis     Citrus Anaphylaxis     All Troup     Contrast Dye Rash     Contrast Media [place] [facility] [place] Contrast Media [facility] [place] [facility] [place].  NOTE: this is a contrast media oral with iodine. Premedicate with methylpred standard for IV contrast, request barium contrast for oral contrast.     Diagnostic X-Ray Materials Hives and Rash     Contrast Media [facility] [place] [facility] [place].; Contrast Media [facility] [place].   "NOTE: this is a contrast media oral with iodine. Premedicate with methylpred standard for IV contrast, request barium contrast for oral contrast.     Pineapple Anaphylaxis, Difficulty breathing and Rash     Reglan [Metoclopramide] Other (See Comments)     IV dose only, in ER, rapid heart rate.     Ace Inhibitors      Difficulty in breathing and GI upset     Amitiza [Lubiprostone] Nausea and Vomiting     Amoxicillin-Pot Clavulanate      Midazolam Unknown     parent states that when pt takes this medication, she wakes up being very violent .     No Clinical Screening - See Comments      Bleech/ chest tightness, itchy throat, swollen tongue, hives     Tizanidine Other (See Comments)     Confusion, back pain, photophobia, abdominal pain, shaking, anxious       Versed      Coming out of pelvic exam at age of 5, was kicking and screaming when coming out of the versed.     Adhesive Tape Rash     Azithromycin Hives and Rash     Cephalexin Itching and Rash        PHYSICAL EXAM:  Pt reports \"small\" headache today, rates 2/10.        HPI:  Patient reports the following new medical problems since last visit: in Urgent Care twice, related to clots in left arm only.     Patient reports improved benefit this injection cycle, but it took longer (2 weeks at least) to notice the effects (usually takes only one week to notice improvement). Believes that injections to shoulders last time were very helpful.     Followed by Dr. Bradshaw neurology for headaches, paresthesia at bilateral upper extremities (TOS was ruled out) and other symptoms. Had venogram last month, which was negative. Physical therapy was recommended.     We reviewed the recommended safety guidelines for  Botox from any vaccine injection, such as the seasonal flu vaccine, by a minimum of 10-14 days with Samara Oropeza. She acknowledged understanding.      RESPONSE TO PREVIOUS TREATMENT:  Change in headache pattern following last series of injections with 185 " units of  Botox on 2020.  Patient reports less benefit and shorter duration of effect with this series of injections.    Muscle weakness:  Rated as 'Mild' severity.  Duration: 1 weeks then fully resolved.  Malaise:  Rated as 'Mild' severity.  Duration: 2 days then fully resolves  Bruising:  Rated as 'Mild' severity.  Duration: 1 weeks then fully resolves    1.  Headache frequency during this injection cycle:  3 headache per month. This is compared to her baseline headache frequency of 30 headache days per month. Significantly less headaches this injection cycle compared to previous.     2.  Headache duration during this injection cycle:  Headache duration ranged from 2 hours to 1day.   Patient reports no episodes of multiple day headaches during this injection cycle.    3.  Headache intensity during this injection cycle:    A.  4/10  =  Typical pain level.    B.  8/10  =  Worst pain level.   C.  2/10  =  Lowest pain level.    4.  Change in headache medication usage during this injection cycle:  (For Example:  Able to decrease use of oral pain medications.) Patient reports that she did not utilize any migraine pain medication during this injection cycle.    5.  ER Visits During This Injection Cycle:  None due to migraine headache.     6.  Functional Performance:  Change in ADL's, social interaction, days lost from work, etc. Her work  is closed right now.       BOTULINUM NEUROTOXIN INJECTION PROCEDURES:    VERIFICATION OF PATIENT IDENTIFICATION AND PROCEDURE     Initials   Patient Name tracy   Patient  [facility]   Procedure Verified by: tracy     Prior to the start of the procedure and with procedural staff participation, I verbally confirmed the patient s identity using two indicators, relevant allergies, that the procedure was appropriate and matched the consent or emergent situation, and that the correct equipment/implants were available. Immediately prior to starting the procedure I conducted the Time Out with the  procedural staff and re-confirmed the patient s name, procedure, and site/side. (The Joint Commission universal protocol was followed.)  Yes    Sedation (Moderate or Deep): None    Above assessments performed by:  Sharmila Lynn,  PT Care Coordinator    Chanda Franco MD      INDICATIONS FOR PROCEDURES:  Samara Oropeza is a 24 year old patient with a complex medical history that includes chronic migraine headaches associated with cervicogenic components.      Her baseline symptoms have been recalcitrant to oral medications and conservative therapy.  She is here today for re-evaluation and it was decided to reinject with Botox.     GOAL OF PROCEDURE:  The goal of this procedure is to decrease pain .    TOTAL DOSE USED: 200 units  Dose Administered:  185 units  Botox (Botulinum Toxin Type A)       2:1 Dilution   Diluent Used:  0.25% Sensorcaine  (NDC: 55150-167-10  Lot: [id number]  Exp: 7/2022)  Total Volume of Diluent Used:  4 ml  Lot # /C3 with Expiration Date:  9/2022  NDC #: Botox 100u ([address]-51)    Was there drug waste? Yes  Amount of drug waste (mL): 15 units Botox.  Reason for waste:  Single use vial  Multi-dose vial: No    Chanda Franco MD   May 5, 2020    Medication guide was offered to patient and was declined.    CONSENT:  The risks, benefits, and treatment options were discussed with Samara Oropeza and she agreed to proceed.    Written consent was obtained by [facility].     EQUIPMENT USED:  Needle-30 gauge    SKIN PREPARATION:  Skin preparation was performed using an alcohol wipe.     GUIDANCE DESCRIPTION:  No guidance was utilized.     AREA/MUSCLE INJECTED:  185 UNITS BOTOX = TOTAL DOSE     1 & 2. SHOULDER GIRDLE & NECK MUSCLES: 35 units Botox = Total Dose, 2:1 Dilution      Right Splenius Cervicis - 5 units of Botox at 1 site/s.   Left Splenius Cervicis - 5 units of Botox at 1 site/s.     Right Lateral Trapezius - 7.5 units of Botox at 3 sites/s.  Left Lateral Trapezius - 7.5 units of Botox  at 3 site/s.      Right Levator Scapulae - 5 units of Botox at 1 site/s (shoulder muscles).   Left Levator Scapulae - 5 units of Botox at 1 site/s (shoulder muscles).       3. HEAD & SCALP MUSCLES: 150 units Botox = Total Dose, 2:1 Dilution     Right Occipitalis - 5 units of Botox at 3 site/s.   Left Occipitalis - 5 units of Botox at 3 site/s.     Right Frontalis - 15 units of Botox at 4 site/s.  Left Frontalis - 15 units of Botox at 4 site/s.     Right Temporalis - 50 units of Botox at 8 site/s.  Left Temporalis - 50 units of Botox at 8 site/s.     Right  - 2.5 units of Botox at 1 site/s.              Left  - 5 units of Botox at 1 site/s.                 Procerus - 2.5 units of Botox at 1 site/s.      RESPONSE TO PROCEDURE:  Samara Oropeza tolerated the procedure well and there were no immediate complications.   She was allowed to recover for an appropriate period of time and was discharged home in stable condition.    FOLLOW UP:  Samara Oropeza was asked to follow up by phone in 7-14 days with Sharmila Lynn PT, Care Coordinator or Vidya Dickinson RN, Care Coordinator, to report her response to this series of injections.  Based on the patient's previous response to this therapy, Samara Oropeza was rescheduled for the next series of injections in 12 weeks.    PLAN (Medication Changes, Therapy Orders, Work or Disability Issues, etc.):  Patient will continue to monitor response to today's injections.     No change in the total dose or sites of injections today.      Chanda Franco MD  Physical Medicine & Rehabilitation     Again, thank you for allowing me to participate in the care of your patient.      Sincerely,    Chanda Franco MD

## 2020-05-05 NOTE — PROGRESS NOTES
BOTULINUM TOXIN PROCEDURE - HEADACHE - NOTE    Chief Complaint   Patient presents with     Headache     Botox injections     Temp 98.2  F (36.8  C) (Oral)      Current Outpatient Medications:      albuterol (PROAIR HFA/PROVENTIL HFA/VENTOLIN HFA) 108 (90 BASE) MCG/ACT Inhaler, Inhale 2 puffs into the lungs every 6 hours as needed for shortness of breath / dyspnea or wheezing, Disp: 1 Inhaler, Rfl: 1     amitriptyline (ELAVIL) 25 MG tablet, TAKE 1 TABLET BY MOUTH AT BEDTIME, Disp: 90 tablet, Rfl: 0     Apremilast (OTEZLA) 10 & 20 & 30 MG TBPK, Follow package directions, Disp: , Rfl:      artificial tears OINT ophthalmic ointment, 0.5 inch strip each eye at night, Disp: 1 Tube, Rfl: 11     benzocaine (TOPICALE XTRA) 20 % GEL, Apply as needed locally to mouth or nasal ulcers for pain; 4 times daily as needed, Disp: 30 g, Rfl: 1     betamethasone valerate (VALISONE) 0.1 % cream, Apply topically 2 times daily as needed , Disp: , Rfl:      bisacodyl (DULCOLAX) 5 MG EC tablet, Take 2 tablets (10 mg) by mouth daily as needed for constipation, Disp: 30 tablet, Rfl: 0     citalopram (CELEXA) 20 MG tablet, TAKE 1 TABLET BY MOUTH EVERY DAY, Disp: 90 tablet, Rfl: 1     clobetasol (TEMOVATE) 0.05 % cream, Apply topically 2 times daily, Disp: 60 g, Rfl: 0     colchicine (COLCYRS) 0.6 MG tablet, TAKE 1 TABLET (0.6 MG) BY MOUTH 2 TIMES DAILY, Disp: 180 tablet, Rfl: 1     cyproheptadine (PERIACTIN) 4 MG tablet, TAKE 2 TABLETS (8 MG) BY MOUTH AT BEDTIME FOR NIGHTMARES, Disp: 180 tablet, Rfl: 1     dicyclomine (BENTYL) 20 MG tablet, Take 1 tablet (20 mg) by mouth 4 times daily as needed, Disp: 120 tablet, Rfl: 3     diphenhydrAMINE (BENADRYL) 25 MG tablet, Take 1 tablet (25 mg) by mouth 3 times daily as needed (itching), Disp: 40 tablet, Rfl: 1     EPINEPHrine (EPIPEN 2-EAMON) 0.3 MG/0.3ML injection, Inject 0.3 mLs (0.3 mg) into the muscle as needed for anaphylaxis, Disp: 0.6 mL, Rfl: 3     fluconazole (DIFLUCAN) 150 MG tablet, , Disp: ,  Rfl:      furosemide (LASIX) 20 MG tablet, 40mg in the AM, 20mg in mid-day, 20mg late afternoon, Disp: 120 tablet, Rfl: 3     furosemide (LASIX) 20 MG tablet, Take 1 tablet (20 mg) by mouth daily, Disp: 60 tablet, Rfl: 3     furosemide (LASIX) 40 MG tablet, Take 1 tablet (40 mg) by mouth 2 times daily, Disp: 60 tablet, Rfl: 3     gabapentin (NEURONTIN) 300 MG capsule, TAKE 1 CAPSULE BY MOUTH THREE TIMES A DAY, Disp: 90 capsule, Rfl: 1     guanFACINE (TENEX) 1 MG tablet, TAKE 3 TABLETS (3 MG) BY MOUTH TWICE DAILY IN THE MORNING AND EVENING., Disp: 180 tablet, Rfl: 5     HYDROcodone-acetaminophen (NORCO) 5-325 MG tablet, Take 1-2 tablets by mouth, Disp: , Rfl:      hydrocortisone 1 % CREA cream, Place rectally 2 times daily as needed for itching, Disp: 28.35 g, Rfl: 1     hydrOXYzine (ATARAX) 25 MG tablet, Take 1-2 tablets every 4-6 hours as needed for pain control., Disp: 30 tablet, Rfl: 0     hyoscyamine (ANASPAZ/LEVSIN) 0.125 MG tablet, Take 1-2 tablets (125-250 mcg) by mouth every 4 hours as needed for cramping, Disp: 40 tablet, Rfl: 1     hypromellose (GENTEAL) 0.3 % SOLN, 1 drop every hour as needed for dry eyes , Disp: , Rfl:      Lactase 9000 units CHEW, , Disp: , Rfl:      lactulose 20 GM/30ML SOLN, Take 30 mLs by mouth 3 times daily as needed (for constipation), Disp: 300 mL, Rfl: 3     lidocaine (LMX4) 4 % external cream, Apply topically once as needed for mild pain, Disp: 120 g, Rfl: 1     lidocaine (LMX4) 4 % external cream, Apply 1 Application topically once as needed for mild pain, Disp: , Rfl:      LINZESS 290 MCG capsule, TAKE 1 CAPSULE BY MOUTH EVERY DAY IN THE MORNING BEFORE BREAKFAST, Disp: 90 capsule, Rfl: 0     LORazepam (ATIVAN) 0.5 MG tablet, Take 1 tablet (0.5 mg) by mouth every 6 hours as needed for anxiety, Disp: 10 tablet, Rfl: 0     methylPREDNISolone (MEDROL) 32 MG tablet, TAKE 1 TABLET BY MOUTH 12 HOURS BEFORE CT SCAN AND AGAIN 2 HOURS BEFORE CT SCAN, Disp: , Rfl: 0     ondansetron  (ZOFRAN-ODT) 8 MG ODT tab, Take 1 tablet (8 mg) by mouth every 8 hours as needed for nausea, Disp: 180 tablet, Rfl: 1     order for DME, Equipment being ordered: Trilok brace 8 1/2 left lower extremity, Disp: 1 Device, Rfl: 0     order for DME, Equipment being ordered: size 8 1/2 walking boot tall, Disp: 1 Device, Rfl: 0     oxyCODONE (ROXICODONE) 5 MG tablet, , Disp: , Rfl:      polyethylene glycol (MIRALAX/GLYCOLAX) powder, Take 1 capful by mouth 3 times daily, Disp: , Rfl:      promethazine (PHENERGAN) 12.5 MG tablet, Take 1-2 tablets (12.5-25 mg) by mouth every 6 hours as needed for nausea, Disp: 30 tablet, Rfl: 3     sucralfate (CARAFATE) 1 GM/10ML suspension, Take 10 mLs (1 g) by mouth 4 times daily, Disp: 1200 mL, Rfl: 2     tiZANidine (ZANAFLEX) 4 MG tablet, , Disp: , Rfl:      triamcinolone (KENALOG) 0.5 % external ointment, Apply 1 g topically 3 times daily as needed for irritation, Disp: 15 g, Rfl: 3     XARELTO ANTICOAGULANT 20 MG TABS tablet, TAKE 1 TABLET BY MOUTH DAILY WITH DINNER, Disp: 30 tablet, Rfl: 2     Allergies   Allergen Reactions     Amoxil [Penicillins] Rash     Dad unsure of reaction.     Bee Venom Anaphylaxis     Bioflavonoids Anaphylaxis     Citrus Anaphylaxis     All [place]     Contrast Dye Rash     Contrast Media Ready-Box [facility], 04/09/2014.; Contrast Media Ready-Box [facility], 04/09/2014.  NOTE: this is a contrast media oral with iodine. Premedicate with methylpred standard for IV contrast, request barium contrast for oral contrast.     Diagnostic X-Ray Materials Hives and Rash     Contrast Media Ready-Box [facility], 04/09/2014.; Contrast Media Ready-Box [facility], 04/09/2014.  NOTE: this is a contrast media oral with iodine. Premedicate with methylpred standard for IV contrast, request barium contrast for oral contrast.     Pineapple Anaphylaxis, Difficulty breathing and Rash     Reglan [Metoclopramide] Other (See Comments)     IV dose only, in ER, rapid heart rate.     Ace Inhibitors       "Difficulty in breathing and GI upset     Amitiza [Lubiprostone] Nausea and Vomiting     Amoxicillin-Pot Clavulanate      Midazolam Unknown     parent states that when pt takes this medication, she wakes up being very violent .     No Clinical Screening - See Comments      Bleech/ chest tightness, itchy throat, swollen tongue, hives     Tizanidine Other (See Comments)     Confusion, back pain, photophobia, abdominal pain, shaking, anxious       Versed      Coming out of pelvic exam at age of 5, was kicking and screaming when coming out of the versed.     Adhesive Tape Rash     Azithromycin Hives and Rash     Cephalexin Itching and Rash        PHYSICAL EXAM:  Pt reports \"small\" headache today, rates 2/10.        HPI:  Patient reports the following new medical problems since last visit: in Urgent Care twice, related to clots in left arm only.     Patient reports improved benefit this injection cycle, but it took longer (2 weeks at least) to notice the effects (usually takes only one week to notice improvement). Believes that injections to shoulders last time were very helpful.     Followed by Dr. Bradshaw neurology for headaches, paresthesia at bilateral upper extremities (TOS was ruled out) and other symptoms. Had venogram last month, which was negative. Physical therapy was recommended.     We reviewed the recommended safety guidelines for  Botox from any vaccine injection, such as the seasonal flu vaccine, by a minimum of 10-14 days with Samara Oropeza. She acknowledged understanding.      RESPONSE TO PREVIOUS TREATMENT:  Change in headache pattern following last series of injections with 185 units of  Botox on 1/8/2020.  Patient reports less benefit and shorter duration of effect with this series of injections.    Muscle weakness:  Rated as 'Mild' severity.  Duration: 1 weeks then fully resolved.  Malaise:  Rated as 'Mild' severity.  Duration: 2 days then fully resolves  Bruising:  Rated as 'Mild' " severity.  Duration: 1 weeks then fully resolves    1.  Headache frequency during this injection cycle:  3 headache per month. This is compared to her baseline headache frequency of 30 headache days per month. Significantly less headaches this injection cycle compared to previous.     2.  Headache duration during this injection cycle:  Headache duration ranged from 2 hours to 1day.   Patient reports no episodes of multiple day headaches during this injection cycle.    3.  Headache intensity during this injection cycle:    A.  4/10  =  Typical pain level.    B.  8/10  =  Worst pain level.   C.  2/10  =  Lowest pain level.    4.  Change in headache medication usage during this injection cycle:  (For Example:  Able to decrease use of oral pain medications.) Patient reports that she did not utilize any migraine pain medication during this injection cycle.    5.  ER Visits During This Injection Cycle:  None due to migraine headache.     6.  Functional Performance:  Change in ADL's, social interaction, days lost from work, etc. Her work  is closed right now.       BOTULINUM NEUROTOXIN INJECTION PROCEDURES:    VERIFICATION OF PATIENT IDENTIFICATION AND PROCEDURE     Initials   Patient Name [facility]   Patient  [facility]   Procedure Verified by: tracy     Prior to the start of the procedure and with procedural staff participation, I verbally confirmed the patient s identity using two indicators, relevant allergies, that the procedure was appropriate and matched the consent or emergent situation, and that the correct equipment/implants were available. Immediately prior to starting the procedure I conducted the Time Out with the procedural staff and re-confirmed the patient s name, procedure, and site/side. (The Joint Commission universal protocol was followed.)  Yes    Sedation (Moderate or Deep): None    Above assessments performed by:  Sharmila Lynn,  PT Care Coordinator    Chanda Franco MD      INDICATIONS FOR  PROCEDURES:  Samara Oropeza is a 24 year old patient with a complex medical history that includes chronic migraine headaches associated with cervicogenic components.      Her baseline symptoms have been recalcitrant to oral medications and conservative therapy.  She is here today for re-evaluation and it was decided to reinject with Botox.     GOAL OF PROCEDURE:  The goal of this procedure is to decrease pain .    TOTAL DOSE USED: 200 units  Dose Administered:  185 units  Botox (Botulinum Toxin Type A)       2:1 Dilution   Diluent Used:  0.25% Sensorcaine  (NDC: 55150-167-10  Lot: [id number]  Exp: 7/2022)  Total Volume of Diluent Used:  4 ml  Lot # /C3 with Expiration Date:  9/2022  NDC #: Botox 100u ([address]-62)    Was there drug waste? Yes  Amount of drug waste (mL): 15 units Botox.  Reason for waste:  Single use vial  Multi-dose vial: Koki Franco MD   May 5, 2020    Medication guide was offered to patient and was declined.    CONSENT:  The risks, benefits, and treatment options were discussed with Samara Oropeza and she agreed to proceed.    [facility].     EQUIPMENT USED:  Needle-30 gauge    SKIN PREPARATION:  Skin preparation was performed using an alcohol wipe.     GUIDANCE DESCRIPTION:  No guidance was utilized.     AREA/MUSCLE INJECTED:  185 UNITS BOTOX = TOTAL DOSE     1 & 2. SHOULDER GIRDLE & NECK MUSCLES: 35 units Botox = Total Dose, 2:1 Dilution      Right Splenius Cervicis - 5 units of Botox at 1 site/s.   Left Splenius Cervicis - 5 units of Botox at 1 site/s.     Right Lateral Trapezius - 7.5 units of Botox at 3 sites/s.  Left Lateral Trapezius - 7.5 units of Botox at 3 site/s.      Right Levator Scapulae - 5 units of Botox at 1 site/s (shoulder muscles).   Left Levator Scapulae - 5 units of Botox at 1 site/s (shoulder muscles).       3. HEAD & SCALP MUSCLES: 150 units Botox = Total Dose, 2:1 Dilution     Right Occipitalis - 5 units of Botox at 3  site/s.   Left Occipitalis - 5 units of Botox at 3 site/s.     Right Frontalis - 15 units of Botox at 4 site/s.  Left Frontalis - 15 units of Botox at 4 site/s.     Right Temporalis - 50 units of Botox at 8 site/s.  Left Temporalis - 50 units of Botox at 8 site/s.     Right  - 2.5 units of Botox at 1 site/s.              Left  - 5 units of Botox at 1 site/s.                 Procerus - 2.5 units of Botox at 1 site/s.      RESPONSE TO PROCEDURE:  Samara Oropeza tolerated the procedure well and there were no immediate complications.   She was allowed to recover for an appropriate period of time and was discharged home in stable condition.    FOLLOW UP:  Samara Oropeza was asked to follow up by phone in 7-14 days with Sharmila Lynn PT, Care Coordinator or Vidya Dickinson RN, Care Coordinator, to report her response to this series of injections.  Based on the patient's previous response to this therapy, Samara Oropeza was rescheduled for the next series of injections in 12 weeks.    PLAN (Medication Changes, Therapy Orders, Work or Disability Issues, etc.):  Patient will continue to monitor response to today's injections.     No change in the total dose or sites of injections today.      Chanda Franco MD  Physical Medicine & Rehabilitation

## 2020-05-07 RX ORDER — BUPIVACAINE HYDROCHLORIDE 2.5 MG/ML
4 INJECTION, SOLUTION INFILTRATION; PERINEURAL ONCE
Status: COMPLETED | OUTPATIENT
Start: 2020-05-05 | End: 2020-05-05

## 2020-05-20 ENCOUNTER — OFFICE VISIT - HEALTHEAST (OUTPATIENT)
Dept: BEHAVIORAL HEALTH | Facility: CLINIC | Age: 25
End: 2020-05-20

## 2020-05-20 DIAGNOSIS — F41.1 GENERALIZED ANXIETY DISORDER: ICD-10-CM

## 2020-05-20 ASSESSMENT — ANXIETY QUESTIONNAIRES
6. BECOMING EASILY ANNOYED OR IRRITABLE: SEVERAL DAYS
7. FEELING AFRAID AS IF SOMETHING AWFUL MIGHT HAPPEN: NOT AT ALL
IF YOU CHECKED OFF ANY PROBLEMS ON THIS QUESTIONNAIRE, HOW DIFFICULT HAVE THESE PROBLEMS MADE IT FOR YOU TO DO YOUR WORK, TAKE CARE OF THINGS AT HOME, OR GET ALONG WITH OTHER PEOPLE: SOMEWHAT DIFFICULT
GAD7 TOTAL SCORE: 7
5. BEING SO RESTLESS THAT IT IS HARD TO SIT STILL: SEVERAL DAYS
1. FEELING NERVOUS, ANXIOUS, OR ON EDGE: SEVERAL DAYS
4. TROUBLE RELAXING: NEARLY EVERY DAY
2. NOT BEING ABLE TO STOP OR CONTROL WORRYING: SEVERAL DAYS
3. WORRYING TOO MUCH ABOUT DIFFERENT THINGS: NOT AT ALL

## 2020-05-20 ASSESSMENT — PATIENT HEALTH QUESTIONNAIRE - PHQ9: SUM OF ALL RESPONSES TO PHQ QUESTIONS 1-9: 7

## 2020-05-21 DIAGNOSIS — M79.7 FIBROMYALGIA: ICD-10-CM

## 2020-05-21 DIAGNOSIS — F41.1 GAD (GENERALIZED ANXIETY DISORDER): ICD-10-CM

## 2020-05-21 DIAGNOSIS — L29.9 PRURITUS: ICD-10-CM

## 2020-05-21 RX ORDER — LORAZEPAM 0.5 MG/1
TABLET ORAL
Qty: 10 TABLET | Refills: 0 | Status: ON HOLD | OUTPATIENT
Start: 2020-05-21 | End: 2023-02-12

## 2020-05-21 NOTE — TELEPHONE ENCOUNTER
"Requested Prescriptions   Pending Prescriptions Disp Refills     amitriptyline (ELAVIL) 25 MG tablet [Pharmacy Med Name: AMITRIPTYLINE HCL 25 MG TAB] 90 tablet 0     Sig: TAKE 1 TABLET BY MOUTH EVERYDAY AT BEDTIME       Tricyclic Agents ( Annual appt and no PHQ9) Passed - 5/21/2020 12:52 PM   Last Written Prescription Date: 2/28/20  Last Fill Quantity: 90,  # refills: 0   Last office visit: 12/31/2019 with prescribing provider: SHONNA Abreu   Future Office Visit:    Sent Rx.        Passed - Blood Pressure under 140/90 in past 12 mos     BP Readings from Last 3 Encounters:   02/25/20 117/70   02/17/20 124/84   01/22/20 119/82             Passed - Recent (12 mo) or future (30 days) visit within authorizing provider's specialty     Patient has had an office visit with the authorizing provider or a provider within the authorizing providers department within the previous 12 mos or has a future within next 30 days. See \"Patient Info\" tab in inbasket, or \"Choose Columns\" in Meds & Orders section of the refill encounter.              Passed - Medication is active on med list        Passed - Patient is age 18 or older        Passed - Patient is not pregnant        Passed - No positive pregnancy test on record in past 12 mos           LORazepam (ATIVAN) 0.5 MG tablet [Pharmacy Med Name: LORAZEPAM 0.5 MG TABLET] 10 tablet 0     Sig: TAKE 1 TABLET BY MOUTH EVERY 6 HOURS AS NEEDED FOR ANXIETY   Last Written Prescription Date: 12/5/198  Last Fill Quantity: 10,  # refills: 0   Last office visit: 12/31/2019 with prescribing provider:  SHONNA Abreu   Future Office Visit:        There is no refill protocol information for this order        Unable to check  for this provider.     Elsy Hardin RN   [facility]     "

## 2020-05-27 NOTE — LETTER
5/1/2019       RE: Samara Oropeza  [address]  [place] [address]     Dear Colleague,    Thank you for referring your patient, Samara Oropeza, to the [facility] PHYSICAL MEDICINE AND REHABILITATION at [facility]. Please see a copy of my visit note below.    BOTULINUM TOXIN PROCEDURE - HEADACHE - NOTE    Chief Complaint   Patient presents with     Botox     UMP RETURN      /81   Pulse 98   Temp 97.9  F (36.6  C) (Oral)   Resp 16   Wt 70.3 kg (155 lb)   SpO2 98%   BMI 27.46 kg/m        Current Outpatient Medications:      albuterol (PROAIR HFA/PROVENTIL HFA/VENTOLIN HFA) 108 (90 BASE) MCG/ACT Inhaler, Inhale 2 puffs into the lungs every 6 hours as needed for shortness of breath / dyspnea or wheezing, Disp: 1 Inhaler, Rfl: 1     amitriptyline (ELAVIL) 25 MG tablet, Take 1 tablet (25 mg) by mouth At Bedtime, Disp: 30 tablet, Rfl: 1     artificial tears OINT ophthalmic ointment, 0.5 inch strip each eye at night, Disp: 1 Tube, Rfl: 11     aspirin (ASPIRIN CHILDRENS) 81 MG chewable tablet, Take 81 mg by mouth as needed , Disp: , Rfl:      benzocaine (TOPICALE XTRA) 20 % GEL, Apply as needed locally to mouth or nasal ulcers for pain; 4 times daily as needed, Disp: 30 g, Rfl: 1     betamethasone valerate (VALISONE) 0.1 % cream, Apply topically 2 times daily, Disp: , Rfl:      bisacodyl (DULCOLAX) 5 MG EC tablet, Take 2 tablets (10 mg) by mouth daily as needed for constipation, Disp: 30 tablet, Rfl: 0     citalopram (CELEXA) 10 MG tablet, Take 1 tablet (10 mg) by mouth daily, Disp: 30 tablet, Rfl: 1     clobetasol (TEMOVATE) 0.05 % cream, Apply topically 2 times daily, Disp: 60 g, Rfl: 0     colchicine (COLCYRS) 0.6 MG tablet, Take 0.5 tablets (0.3 mg) by mouth daily, Disp: , Rfl:      cyproheptadine (PERIACTIN) 4 MG tablet, Take 1 tablet (4 mg) by mouth At Bedtime For nightmares, Disp: 30 tablet, Rfl: 2     dicyclomine (BENTYL) 20 MG tablet, Take 1 tablet  Left voicemail message for pt to return call to clinic.    (20 mg) by mouth 4 times daily as needed, Disp: 120 tablet, Rfl: 3     diphenhydrAMINE (BENADRYL) 25 MG tablet, Take 1 tablet (25 mg) by mouth 3 times daily as needed (itching), Disp: 40 tablet, Rfl: 1     EPINEPHrine (EPIPEN 2-EAMON) 0.3 MG/0.3ML injection, Inject 0.3 mLs (0.3 mg) into the muscle as needed for anaphylaxis, Disp: 0.6 mL, Rfl: 3     gabapentin (NEURONTIN) 100 MG capsule, Take 1 capsule (100 mg) by mouth 3 times daily as needed (anxiety), Disp: 90 capsule, Rfl: 1     guanFACINE (TENEX) 1 MG tablet, Take 3 tablets (3 mg) by mouth twice daily in the morning and evening., Disp: 180 tablet, Rfl: 3     hydrocortisone 1 % CREA cream, Place rectally 2 times daily as needed for itching, Disp: 28.35 g, Rfl: 1     hyoscyamine (ANASPAZ/LEVSIN) 0.125 MG tablet, Take 1-2 tablets (125-250 mcg) by mouth every 4 hours as needed for cramping, Disp: 40 tablet, Rfl: 1     hypromellose (GENTEAL) 0.3 % SOLN, 1 drop every hour as needed for dry eyes , Disp: , Rfl:      lactulose 20 GM/30ML SOLN, Take 30 mLs by mouth 3 times daily as needed (for constipation), Disp: 300 mL, Rfl: 3     lidocaine (LMX4) 4 % external cream, Apply topically once as needed for mild pain, Disp: , Rfl:      linaclotide (LINZESS) 290 MCG capsule, Take 1 capsule (290 mcg) by mouth every morning (before breakfast), Disp: 90 capsule, Rfl: 3     LORazepam (ATIVAN) 0.5 MG tablet, Take 1 tablet (0.5 mg) by mouth every 6 hours as needed for anxiety, Disp: 10 tablet, Rfl: 0     Magic Mouthwash (FV std formula) lidocaine visc 2% 2.5mL/5mL & maalox/mylanta w/ simeth 2.5mL/5mL & diphenhydrAMINE 5mg/5mL, Swish and swallow 10 mLs in mouth every 6 hours as needed for mouth sores, Disp: 1 Bottle, Rfl: 1     nitroFURantoin macrocrystal-monohydrate (MACROBID) 100 MG capsule, Take 1 capsule (100 mg) by mouth At Bedtime, Disp: 90 capsule, Rfl: 1     norgestimate-ethinyl estradiol (ORTHO-CYCLEN/SPRINTEC) 0.25-35 MG-MCG tablet, Take 1 tablet by mouth daily, Disp: 84  tablet, Rfl: 4     omeprazole (PRILOSEC) 40 MG capsule, Take 1 capsule (40 mg) by mouth daily, Disp: 90 capsule, Rfl: 3     ondansetron (ZOFRAN-ODT) 8 MG ODT tab, Take 1 tablet (8 mg) by mouth every 8 hours as needed for nausea, Disp: 90 tablet, Rfl: 1     order for DME, Roll-A-Bout Walker. Patient can use for 3 months, Disp: 1 Units, Rfl: 0     order for DME, Equipment being ordered: size 8 1/2 walking boot tall, Disp: 1 Device, Rfl: 0     order for DME, Equipment being ordered: RollAbout knee scooter, Disp: 1 Device, Rfl: 0     order for DME, Equipment being ordered: adult pair of crutches, Disp: 1 Device, Rfl: 0     polyethylene glycol (MIRALAX/GLYCOLAX) powder, Take 1 capful by mouth 3 times daily, Disp: , Rfl:      sennosides (SENOKOT) 8.6 MG tablet, Take 3 tablets by mouth 2 times daily, Disp: 240 tablet, Rfl: 3     sodium chloride 0.9% SOLN BOLUS, Inject 1,000 mLs into the vein daily as needed (IV abx and flushes), Disp: 1000 mL, Rfl: 11     SPRINTEC 28 0.25-35 MG-MCG tablet, Take one tablet by mouth daily. Take continuously., Disp: 84 tablet, Rfl: 1     sucralfate (CARAFATE) 1 GM/10ML suspension, Take 10 mLs (1 g) by mouth 4 times daily, Disp: 1200 mL, Rfl: 2    Current Facility-Administered Medications:      heparin lock flush 10 UNIT/ML injection 5 mL, 5 mL, Intracatheter, Once, Erica Joseph MD     Allergies   Allergen Reactions     Amoxil [Penicillins] Rash     Dad unsure of reaction.     Bee Venom Anaphylaxis     Contrast Dye Rash     Contrast Media Ready-Box [facility] [place] 04[place]09[place]2014.; Contrast Media Ready-[facility] [place]09/[place].  NOTE: this is a contrast media oral with iodine. Premedicate with methylpred standard for IV contrast, request barium contrast for oral contrast.     Orange Fruit [Citrus] Anaphylaxis     Pineapple Anaphylaxis, Difficulty breathing and Rash     Reglan [Metoclopramide] Other (See Comments)     IV dose only, in ER, rapid heart rate.     Ace Inhibitors      Difficulty in breathing and  GI upset     Amitiza [Lubiprostone] Nausea and Vomiting     Amoxicillin-Pot Clavulanate      Midazolam Unknown     Other reaction(s): Unknown  parent states that when pt takes this medication, she wakes up being very violent .  parent states that when pt takes this medication, she wakes up being very violent .     No Clinical Screening - See Comments      Bleech/ chest tightness, itchy throat, swollen tongue, hives     Tizanidine Other (See Comments)     Confusion, back pain, photophobia, abdominal pain, shaking, anxious       Versed      Coming out of pelvic exam at age of 5, was kicking and screaming when coming out of the versed.     Adhesive Tape Rash     Azithromycin Hives and Rash     Cephalexin Itching and Rash     Itchy mouth  Itchy mouth     Keflex [Cephalexin-Fd&C Yellow #6] Hives        PHYSICAL EXAM:    Denies headache at today's visit.  Is in a walking boot on the left and has a PICC line in place.    HPI:    Patient reports the following new medical problems since last visit: Patient was admitted to hospital on 03/09/2019 for a left ankle abscess.  Completing course of antibiotics via PICC line today.   She was also seen in the ER on 03/26/2019 for an occluded PICC line and again on 03/27/2019 for abdominal pain and acute kidney injury secondary to the IV antibiotics she's been receiving.  She was also seen in the ER for irritable bowel syndrome on 03/30/2019 with a subsequent hospital admission.  On 04/26/2019 she was also seen in the ER for elevated d-dimer to rule out a blood clot.  No clot was found.      We reviewed the recommended safety guidelines for  Botox from any vaccine injection, such as the seasonal flu vaccine, by a minimum of 10-14 days with Samara Oropeza. She acknowledged understanding.    RESPONSE TO PREVIOUS TREATMENT:  Change in headache pattern following last series of injections with 175 units on 02/05/2019.     No problems reported    1.  Headache frequency during  this injection cycle: Patient reports increased headache frequency during the past injection cycle that she attributes to the many medical issues she has experienced.  She reports approximately 8-12 headache days per month during the past injection cycle.  She generally experiences This is compared to her baseline headache frequency of 20 headache days per month.     2.  Headache duration during this injection cycle:  Headache duration ranged from hours to 4 days. Patient reports 3 episodes of multiple day headaches during this injection cycle.    3.  Headache intensity during this injection cycle:    A.  3/10  =  Typical pain level.  B.  8/10  =  Worst pain level.  C.  0/10  =  Lowest pain level.    4.  Change in headache medication usage during this injection cycle:  Utilized migraine medications while hospitalized.    5.  ER Visits During This Injection Cycle Due to Migraine Headache:  None.    6.  Functional Performance:  Change in ADL's, social interaction, days lost from work, etc. Patient reports that she needed to cancel a number of social obligations secondary to her multiple medical issues during this injection cycle. after the procedure which is typical for her.      BOTULINUM NEUROTOXIN INJECTION PROCEDURES:      VERIFICATION OF PATIENT IDENTIFICATION AND PROCEDURE     Initials   Patient Name tlb   Patient  tlb   Procedure Verified by: tlb     Prior to the start of the procedure and with procedural staff participation, I verbally confirmed the patient s identity using two indicators, relevant allergies, that the procedure was appropriate and matched the consent or emergent situation, and that the correct equipment/implants were available. Immediately prior to starting the procedure I conducted the Time Out with the procedural staff and re-confirmed the patient s name, procedure, and site/side. (The Joint Commission universal protocol was followed.)  Yes    Sedation (Moderate or Deep): None    Above  assessments performed by:    Marcelle Richardson, PT - Care Coordiantor      INDICATIONS FOR PROCEDURES:  Samara Oropeza is a 23-year-old patient with a complex medical history that includes Tourette's syndrome, spasmodic torticollis, chronic neck pain, and chronic migraine headaches associated with cervicogenic components.     Her baseline symptoms have been recalcitrant to oral medications and conservative therapy.  She is here today for reinjection with Botox.    GOAL OF PROCEDURE:  The goal of this procedure is to increase active range of motion, improve volitional motor control, decrease pain  and enhance functional independence.    TOTAL DOSE ADMINISTERED:  Dose Administered:  175 units  Botox (Botulinum Toxin Type A)       2:1 Dilution   Diluent Volume: 3.5 ml Sensorcaine  Diluent Used:  0.25% Sensorcaine (Batch: [id number], Exp: 11/2021, NDC 55150-167-10)   Lot: /C3  with Expiration Date: 09/2021  NDC #: Botox 100u ([address]-10)    Was there drug waste? Yes  Amount of drug waste (mL): 25 units Botox.  Reason for waste:  Single use vial  Multi-dose vial: No    Medication guide was offered to patient and was declined.    CONSENT:  The risks, benefits, and treatment options were discussed with Samara Oropeza and she agreed to proceed.    Written consent was obtained by TLB.     EQUIPMENT USED:  Needle-37mm stimulating/recording  Needle-30 gauge  EMG/NCS Machine    SKIN PREPARATION:  Skin preparation was performed using an alcohol wipe.    GUIDANCE DESCRIPTION:  Electro-myographic guidance was necessary throughout the procedure to accurately identify all areas of spastic muscles while avoiding injection of non-spastic muscles, neighboring nerves and nearby vascular structures.     AREA/MUSCLE INJECTED:  175 UNITS BOTOX = TOTAL DOSE     1 & 2. SHOULDER GIRDLE & NECK MUSCLES: 20 units Botox = Total Dose, 2:1 Dilution   Right Splenius - 5 units of Botox at 1 site/s.   Left Splenius - 5 units of Botox at  1 site/s.      Right Levator Scapulae - 5 units of Botox at 1 site/s (shoulder muscles).   Left Levator Scapulae - 5 units of Botox at 1 site/s (shoulder muscles).     3. HEAD & SCALP MUSCLES: 155 units Botox = Total Dose, 2:1 Dilution  Right Occipitalis - 10 units of Botox at 3 site/s.   Left Occipitalis - 10 units of Botox at 3 site/s.     Right Frontalis - 15 units of Botox at 4 site/s.  Left Frontalis - 15 units of Botox at 4 site/s.     Right Temporalis - 45 units of Botox at 8 site/s.  Left Temporalis - 45 units of Botox at 8 site/s.     Right  - 5 units of Botox at 1 site/s.              Left  - 5 units of Botox at 1 site/s.      Procerus - 5 units of Botox at 1 site/s.      RESPONSE TO PROCEDURE:  Samara Oropeza tolerated the procedure well and there were no immediate complications.  She was allowed to recover for an appropriate period of time and was discharged home in stable condition.    FOLLOW UP:  Samara Oropeza was asked to follow up by phone in 7-14 days with Marcelle Richardson PT, Care Coordinator or Vidya Dickinson RN, Care Coordinator, to report her response to this series of injections.  Based on the patient's previous response to this therapy, Samara Oropeza was rescheduled for the next series of injections in 12 weeks.    PLAN (Medication Changes, Therapy Orders, Work or Disability Issues, etc.): Patient will continue to monitor response to today's injections.    Again, thank you for allowing me to participate in the care of your patient.      Sincerely,    Alejandrina Hernandez MD

## 2020-06-09 ENCOUNTER — OFFICE VISIT - HEALTHEAST (OUTPATIENT)
Dept: BEHAVIORAL HEALTH | Facility: CLINIC | Age: 25
End: 2020-06-09

## 2020-06-09 DIAGNOSIS — F41.1 GENERALIZED ANXIETY DISORDER: ICD-10-CM

## 2020-06-24 ENCOUNTER — OFFICE VISIT - HEALTHEAST (OUTPATIENT)
Dept: BEHAVIORAL HEALTH | Facility: CLINIC | Age: 25
End: 2020-06-24

## 2020-06-24 DIAGNOSIS — F41.1 GENERALIZED ANXIETY DISORDER: ICD-10-CM

## 2020-06-24 ASSESSMENT — ANXIETY QUESTIONNAIRES
5. BEING SO RESTLESS THAT IT IS HARD TO SIT STILL: SEVERAL DAYS
IF YOU CHECKED OFF ANY PROBLEMS ON THIS QUESTIONNAIRE, HOW DIFFICULT HAVE THESE PROBLEMS MADE IT FOR YOU TO DO YOUR WORK, TAKE CARE OF THINGS AT HOME, OR GET ALONG WITH OTHER PEOPLE: SOMEWHAT DIFFICULT
4. TROUBLE RELAXING: MORE THAN HALF THE DAYS
3. WORRYING TOO MUCH ABOUT DIFFERENT THINGS: SEVERAL DAYS
2. NOT BEING ABLE TO STOP OR CONTROL WORRYING: SEVERAL DAYS
1. FEELING NERVOUS, ANXIOUS, OR ON EDGE: SEVERAL DAYS
GAD7 TOTAL SCORE: 7
7. FEELING AFRAID AS IF SOMETHING AWFUL MIGHT HAPPEN: NOT AT ALL
6. BECOMING EASILY ANNOYED OR IRRITABLE: SEVERAL DAYS

## 2020-06-24 ASSESSMENT — PATIENT HEALTH QUESTIONNAIRE - PHQ9: SUM OF ALL RESPONSES TO PHQ QUESTIONS 1-9: 7

## 2020-07-08 ENCOUNTER — OFFICE VISIT - HEALTHEAST (OUTPATIENT)
Dept: BEHAVIORAL HEALTH | Facility: CLINIC | Age: 25
End: 2020-07-08

## 2020-07-08 DIAGNOSIS — F41.1 GENERALIZED ANXIETY DISORDER: ICD-10-CM

## 2020-07-22 ENCOUNTER — OFFICE VISIT - HEALTHEAST (OUTPATIENT)
Dept: BEHAVIORAL HEALTH | Facility: CLINIC | Age: 25
End: 2020-07-22

## 2020-07-22 DIAGNOSIS — F41.1 GENERALIZED ANXIETY DISORDER: ICD-10-CM

## 2020-07-27 ENCOUNTER — OFFICE VISIT (OUTPATIENT)
Dept: FAMILY MEDICINE | Facility: CLINIC | Age: 25
End: 2020-07-27
Payer: COMMERCIAL

## 2020-07-27 VITALS
BODY MASS INDEX: 29.67 KG/M2 | WEIGHT: 167.5 LBS | DIASTOLIC BLOOD PRESSURE: 70 MMHG | TEMPERATURE: 98.3 F | HEART RATE: 105 BPM | SYSTOLIC BLOOD PRESSURE: 112 MMHG | RESPIRATION RATE: 16 BRPM | OXYGEN SATURATION: 100 %

## 2020-07-27 DIAGNOSIS — M06.09 RHEUMATOID ARTHRITIS OF MULTIPLE SITES WITHOUT RHEUMATOID FACTOR (H): ICD-10-CM

## 2020-07-27 DIAGNOSIS — K08.9: ICD-10-CM

## 2020-07-27 DIAGNOSIS — I82.622 DEEP VEIN THROMBOSIS (DVT) OF LEFT UPPER EXTREMITY, UNSPECIFIED CHRONICITY, UNSPECIFIED VEIN (H): ICD-10-CM

## 2020-07-27 DIAGNOSIS — G89.4 CHRONIC PAIN SYNDROME: ICD-10-CM

## 2020-07-27 DIAGNOSIS — R60.1 GENERALIZED EDEMA: ICD-10-CM

## 2020-07-27 DIAGNOSIS — F41.1 GENERALIZED ANXIETY DISORDER: ICD-10-CM

## 2020-07-27 DIAGNOSIS — F43.10 PTSD (POST-TRAUMATIC STRESS DISORDER): ICD-10-CM

## 2020-07-27 DIAGNOSIS — F41.1 GAD (GENERALIZED ANXIETY DISORDER): ICD-10-CM

## 2020-07-27 DIAGNOSIS — R07.89 ATYPICAL CHEST PAIN: ICD-10-CM

## 2020-07-27 DIAGNOSIS — K31.84 GASTROPARESIS: ICD-10-CM

## 2020-07-27 DIAGNOSIS — F95.2 TOURETTE'S SYNDROME: ICD-10-CM

## 2020-07-27 DIAGNOSIS — R40.4 TRANSIENT ALTERATION OF AWARENESS: ICD-10-CM

## 2020-07-27 DIAGNOSIS — Z01.818 PREOP GENERAL PHYSICAL EXAM: Primary | ICD-10-CM

## 2020-07-27 DIAGNOSIS — M06.9 RHEUMATOID ARTHRITIS, INVOLVING UNSPECIFIED SITE, UNSPECIFIED RHEUMATOID FACTOR PRESENCE: ICD-10-CM

## 2020-07-27 DIAGNOSIS — M35.2 BEHCET'S DISEASE (H): ICD-10-CM

## 2020-07-27 LAB
ALBUMIN SERPL-MCNC: 4.4 G/DL (ref 3.4–5)
ALP SERPL-CCNC: 103 U/L (ref 40–150)
ALT SERPL W P-5'-P-CCNC: 40 U/L (ref 0–50)
ANION GAP SERPL CALCULATED.3IONS-SCNC: 8 MMOL/L (ref 3–14)
AST SERPL W P-5'-P-CCNC: 35 U/L (ref 0–45)
BASOPHILS # BLD AUTO: 0 10E9/L (ref 0–0.2)
BASOPHILS NFR BLD AUTO: 0.4 %
BILIRUB SERPL-MCNC: 0.5 MG/DL (ref 0.2–1.3)
BUN SERPL-MCNC: 6 MG/DL (ref 7–30)
CALCIUM SERPL-MCNC: 9.4 MG/DL (ref 8.5–10.1)
CHLORIDE SERPL-SCNC: 106 MMOL/L (ref 94–109)
CO2 SERPL-SCNC: 22 MMOL/L (ref 20–32)
CREAT SERPL-MCNC: 0.69 MG/DL (ref 0.52–1.04)
DIFFERENTIAL METHOD BLD: ABNORMAL
EOSINOPHIL # BLD AUTO: 0.1 10E9/L (ref 0–0.7)
EOSINOPHIL NFR BLD AUTO: 2.7 %
ERYTHROCYTE [DISTWIDTH] IN BLOOD BY AUTOMATED COUNT: 16 % (ref 10–15)
GFR SERPL CREATININE-BSD FRML MDRD: >90 ML/MIN/{1.73_M2}
GLUCOSE SERPL-MCNC: 100 MG/DL (ref 70–99)
HCT VFR BLD AUTO: 38.5 % (ref 35–47)
HGB BLD-MCNC: 12 G/DL (ref 11.7–15.7)
LYMPHOCYTES # BLD AUTO: 1.9 10E9/L (ref 0.8–5.3)
LYMPHOCYTES NFR BLD AUTO: 40.8 %
MCH RBC QN AUTO: 25.1 PG (ref 26.5–33)
MCHC RBC AUTO-ENTMCNC: 31.2 G/DL (ref 31.5–36.5)
MCV RBC AUTO: 81 FL (ref 78–100)
MONOCYTES # BLD AUTO: 0.3 10E9/L (ref 0–1.3)
MONOCYTES NFR BLD AUTO: 6.3 %
NEUTROPHILS # BLD AUTO: 2.4 10E9/L (ref 1.6–8.3)
NEUTROPHILS NFR BLD AUTO: 49.8 %
PLATELET # BLD AUTO: 225 10E9/L (ref 150–450)
POTASSIUM SERPL-SCNC: 3.6 MMOL/L (ref 3.4–5.3)
PROT SERPL-MCNC: 7.9 G/DL (ref 6.8–8.8)
RBC # BLD AUTO: 4.78 10E12/L (ref 3.8–5.2)
SODIUM SERPL-SCNC: 136 MMOL/L (ref 133–144)
WBC # BLD AUTO: 4.8 10E9/L (ref 4–11)

## 2020-07-27 PROCEDURE — 85025 COMPLETE CBC W/AUTO DIFF WBC: CPT | Performed by: NURSE PRACTITIONER

## 2020-07-27 PROCEDURE — 36415 COLL VENOUS BLD VENIPUNCTURE: CPT | Performed by: NURSE PRACTITIONER

## 2020-07-27 PROCEDURE — 99214 OFFICE O/P EST MOD 30 MIN: CPT | Performed by: NURSE PRACTITIONER

## 2020-07-27 PROCEDURE — 80053 COMPREHEN METABOLIC PANEL: CPT | Performed by: NURSE PRACTITIONER

## 2020-07-27 RX ORDER — FUROSEMIDE 20 MG
20 TABLET ORAL 3 TIMES DAILY
Qty: 60 TABLET | Refills: 3 | COMMUNITY
Start: 2020-07-27 | End: 2022-10-28

## 2020-07-27 RX ORDER — ALBUTEROL SULFATE 90 UG/1
2 AEROSOL, METERED RESPIRATORY (INHALATION) EVERY 6 HOURS PRN
Qty: 1 INHALER | Refills: 1 | Status: SHIPPED | OUTPATIENT
Start: 2020-07-27

## 2020-07-27 RX ORDER — CITALOPRAM HYDROBROMIDE 20 MG/1
20 TABLET ORAL DAILY
Qty: 90 TABLET | Refills: 1 | Status: SHIPPED | OUTPATIENT
Start: 2020-07-27

## 2020-07-27 NOTE — PROGRESS NOTES
[facility] PRIMARY CARE  [address]   [address]  [facility] [address]  [phone]  Dept: [phone]    PRE-OP EVALUATION:  Today's date: 2020    Samara Oropeza (: 1995) presents for pre-operative evaluation assessment as requested by  .  She requires evaluation and anesthesia risk assessment prior to undergoing surgery/procedure for treatment of [place] tooth extraction .    Proposed Surgery/ Procedure: teeth extraction   Date of Surgery/ Procedure: 2020  Time of Surgery/ Procedure:   Hospital/Surgical Facility: [facility] hospital   Surgery Fax Number:   Primary Physician: Sonja Abreu  Type of Anesthesia Anticipated: to be determined    Preoperative Questionnaire:   No - Have you ever had a heart attack or stroke?  No - Have you ever had surgery on your heart or blood vessels, such as a stent, coronary (heart) bypass, or surgery on an artery in the head, neck, heart, or legs?  YES - DO YOU HAVE CHEST PAIN WHEN YOU ARE PHYSICALLY ACTIVE? Ongoing many years  No - Do you have a history of heart failure?  No - Do you currently have a cold, bronchitis, or symptoms of other respiratory (head and chest) infections?  No - Do you have a cough, shortness of breath, or wheezing?  YES - DO YOU OR ANYONE IN YOUR FAMILY HAVE A HISTORY OF BLOOD CLOTS? Yes, previous DVT  YES - DO YOU OR ANYONE IN YOUR FAMILY HAVE A SERIOUS BLEEDING PROBLEM, SUCH AS LONG-LASTING BLEEDING AFTER SURGERIES OR CUTS? yes  YES - HAVE YOU EVERY HAD ANEMIA OR BEEN TOLD TO TAKE IRON PILLS  No - Have you had any abnormal blood loss such as black, tarry or bloody stools, or abnormal vaginal bleeding?  No - Have you ever had a blood transfusion?  Yes - Are you willing to have a blood transfusion if it is medically needed before, during, or after your surgery?  YES - HAVE YOU OR ANYONE IN YOUR FAMILY EVER HAD PROBLEMS WITH ANESTHESIA (SEDATION FOR SURGERY)? Woke up later than was expected after  anesthesia  YES - DO YOU HAVE SLEEP APNEA, EXCESSIVE SNORING, OR DAYTIME DROWSINESS? Daytime drowsiness  No - Do you have any artifical heart valves or other implanted medical devices, such as a pacemaker, defibrillator, or continuous glucose monitor?  No - Do you have any artifical joints?  No - Are you allergic to latex?  No - Is there any chance that you may be pregnant?    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 She was previously taking Otezla, but was taken off due to interactions with antibiotics. She plans to re-establish with rheumatology to discuss.  Rheumatoid arthritis: Experiencing flare of rheumatoid arthritis in multiple joints- has tried prednisone in the past without relief; plans to discuss management when she re-establishes with rheumatology.   TAHIR: mood symptoms recently worse due to being off Citalopram, was previously helping to control mood. No SI or HI, would like to re-start citalopram.  Syncopal episode: Continues to have intermittent syncopal episodes and orthostatic hypotension. This has been extensively worked up and no clear cause has been identified. She tries to maintain fluids during the day to avoid orthostatic hypotension.  Gastroparesis: Chronic abdominal bloating and discomfort. Weight is stable, no new nausea or vomiting. Chronic constipation she manages with Linzess and Miralax. Has chronic low appetite.   Left Basilic DVT: History of Left Basilic DVT in 12/2019. Treated with Xarelto for 3 months; repeat ultrasound showed DVT is resolved.    See problem list for active medical problems.  Problems all longstanding and stable, except as noted/documented.  See ROS for pertinent symptoms related to these conditions.      MEDICAL HISTORY:     Patient Active Problem List     Diagnosis Date Noted     Infection of joint of ankle (H) 05/06/2019     Priority: Medium     Cellulitis 03/09/2019     Priority: Medium     Displacement of lumbar intervertebral disc without myelopathy 11/13/2018     Priority: Medium     Overview:   Created by Conversion       Iron deficiency associated with nonfamilial restless legs syndrome 11/13/2018     Priority: Medium     Enthesopathy of hip region 11/13/2018     Priority: Medium     Overview:   Created by Conversion       Tourette's syndrome 11/13/2018     Priority: Medium     Overview:   Created by Conversion       Somatic symptom disorder 06/01/2018     Priority: Medium     Pelvic floor weakness 04/25/2018     Priority: Medium     Spells of decreased attentiveness 12/19/2017     Priority: Medium     Mobile cecum 11/09/2017     Priority: Medium     Cecum noted in Right lower quadrant on 4/17 CT scan, and in Left upper Quadrant on CT on 11/2017.       Vitamin D deficiency 10/11/2017     Priority: Medium     How low, unknown/not found. On D when tested at 27. Starting cholecalciferol October 2017. Needs recheck.       Convulsions, unspecified convulsion type (H) 10/03/2017     Priority: Medium     Transient alteration of awareness 10/03/2017     Priority: Medium     Chronic pain syndrome 07/27/2017     Priority: Medium     Major depressive disorder, recurrent episode, moderate (H) 06/27/2017     Priority: Medium     Cervical pain 05/02/2017     Priority: Medium     Acute left ankle pain 03/31/2017     Priority: Medium     Cervical dystonia 03/28/2017     Priority: Medium     PTSD (post-traumatic stress disorder) 01/17/2017     Priority: Medium     Patellofemoral instability 10/20/2016     Priority: Medium     Fibromyalgia 08/04/2016     Priority: Medium     Rheumatoid arthritis of multiple sites without rheumatoid factor (H) 08/04/2016     Priority: Medium     Raynaud's disease without gangrene 08/04/2016     Priority: Medium     Chronic abdominal pain  08/04/2016     Priority: Medium     Palpitations 01/12/2016     Priority: Medium     On colchicine therapy 10/30/2015     Priority: Medium     Spell of shaking 05/06/2015     Priority: Medium     Migraine 02/04/2015     Priority: Medium     Behcet's disease (H) 12/10/2014     Priority: Medium     Headaches due to old head injury 11/12/2013     Priority: Medium     Milk protein intolerance 10/11/2013     Priority: Medium     Intestinal malabsorption 10/11/2013     Priority: Medium     Concussion 02/13/2013     Priority: Medium     Jan 2013, with prolonged recovery- followed by sports med         Knee pain 01/03/2013     Priority: Medium     Generalized anxiety disorder 06/25/2009     Priority: Medium     Tics - Tourette syndrome 05/18/2009     Priority: Medium     Followed by psychotherapy. Symptoms well managed. Originally diagnosed at [facility] neurology. (Dr. Simpson)           IBS (irritable bowel syndrome) 05/18/2009     Priority: Medium     Allergic rhinitis 05/18/2009     Priority: Medium     GERD (gastroesophageal reflux disease) 01/10/2008     Priority: Medium     Gastroparesis 1994     Priority: Medium      Past Medical History:   Diagnosis Date     Anemia      Anxiety      Arthritis      Behcet's disease (H)      Cervical adenitis May 2010     Chronic abdominal pain      Constipation, chronic 1994     Fibromyalgia      Gastro-oesophageal reflux disease      Gastroparesis      Irregular heart beat     tachycardia, has had workup     Migraines      Neuromuscular disorder (H)     fibramyalgia     Palpitations      PONV (postoperative nausea and vomiting)      Seizure (H)      Seizures (H)     unknown etiology     Syncope      Tourette's      Past Surgical History:   Procedure Laterality Date     ARTHROSCOPY ANKLE, OPEN REPAIR LIGAMENT, COMBINED Left 9/25/2019    Procedure: LEFT ANKLE ARTHROSCOPY WITH LIGAMENT REPAIR;  Surgeon: Andres Johnson DPM;  Location:  OR     ARTHROSCOPY ANKLE, REPAIR  LIGAMENT Left 1/2/2019    Procedure: Ankle arthroscopy and sinus tarsi evacuation, ligament repair, left lower extremity;  Surgeon: Andres Johnson DPM;  Location: [facility] OR     ARTHROSCOPY KNEE WITH PATELLAR REALIGNMENT  7/25/2013    Procedure: ARTHROSCOPY KNEE WITH PATELLAR REALIGNMENT;  Left Knee Arthroscopy, Medial Patellofemoral Ligament Reconstruction with Allograft  ;  Surgeon: Jennifer Acevedo MD;  Location: [facility] OR     COLONOSCOPY  2015     DENTAL SURGERY  1996    Teeth removal     ENDOSCOPY UPPER, COLONOSCOPY, COMBINED  2005     HC ESOPH/GAS REFLUX TEST W NASAL IMPED >1 HR N/A 2/15/2017    Procedure: ESOPHAGEAL IMPEDENCE FUNCTION TEST WITH 24 HOUR PH GREATER THAN 1 HOUR;  Surgeon: Timothy Matta MD;  Location: [facility] GI     IR PICC PLACEMENT > 5 YRS OF AGE  3/13/2019     IR UPPER EXTREMITY VENOGRAM LEFT  2/25/2020     IRRIGATION AND DEBRIDEMENT FOOT, COMBINED Left 3/12/2019    Procedure: COMBINED IRRIGATION AND DEBRIDEMENT LEFT ANKLE;  Surgeon: Micha Glover MD;  Location: [facility] OR     IRRIGATION AND DEBRIDEMENT LOWER EXTREMITY, COMBINED Left 5/7/2019    Procedure: 1.  Excision of wound down to and including deep fascia, less than 20 cm2.  2.  Irrigation and debridement, left ankle.;  Surgeon: Andres Johnson DPM;  Location: [facility] OR     PICC INSERTION Left 05/05/2019    4Fr - 45cm (5cm external), Basilic vein, SVC RA junction     Current Outpatient Medications   Medication Sig Dispense Refill     albuterol (PROAIR HFA/PROVENTIL HFA/VENTOLIN HFA) 108 (90 Base) MCG/ACT inhaler Inhale 2 puffs into the lungs every 6 hours as needed for shortness of breath / dyspnea or wheezing 1 Inhaler 1     amitriptyline (ELAVIL) 25 MG tablet TAKE 1 TABLET BY MOUTH EVERYDAY AT BEDTIME 90 tablet 1     artificial tears OINT ophthalmic ointment 0.5 inch strip each eye at night 1 Tube 11     benzocaine (TOPICALE XTRA) 20 % GEL Apply as needed locally to mouth or nasal ulcers for pain; 4 times daily as needed  30 g 1     betamethasone valerate (VALISONE) 0.1 % cream Apply topically 2 times daily as needed        bisacodyl (DULCOLAX) 5 MG EC tablet Take 2 tablets (10 mg) by mouth daily as needed for constipation 30 tablet 0     citalopram (CELEXA) 20 MG tablet Take 1 tablet (20 mg) by mouth daily 90 tablet 1     colchicine (COLCYRS) 0.6 MG tablet TAKE 1 TABLET (0.6 MG) BY MOUTH 2 TIMES DAILY 180 tablet 1     cyproheptadine (PERIACTIN) 4 MG tablet TAKE 2 TABLETS (8 MG) BY MOUTH AT BEDTIME FOR NIGHTMARES 180 tablet 1     dicyclomine (BENTYL) 20 MG tablet Take 1 tablet (20 mg) by mouth 4 times daily as needed 120 tablet 3     diphenhydrAMINE (BENADRYL) 25 MG tablet Take 1 tablet (25 mg) by mouth 3 times daily as needed (itching) 40 tablet 1     EPINEPHrine (EPIPEN 2-EAMON) 0.3 MG/0.3ML injection Inject 0.3 mLs (0.3 mg) into the muscle as needed for anaphylaxis 0.6 mL 3     furosemide (LASIX) 20 MG tablet Take 1 tablet (20 mg) by mouth 3 times daily 60 tablet 3     gabapentin (NEURONTIN) 300 MG capsule TAKE 1 CAPSULE BY MOUTH THREE TIMES A DAY 90 capsule 1     guanFACINE (TENEX) 1 MG tablet TAKE 3 TABLETS (3 MG) BY MOUTH TWICE DAILY IN THE MORNING AND EVENING. 180 tablet 5     hypromellose (GENTEAL) 0.3 % SOLN 1 drop every hour as needed for dry eyes        Lactase 9000 units CHEW Take 1 tablet by mouth daily 90 tablet 1     lactulose 20 GM/30ML SOLN Take 30 mLs by mouth 3 times daily as needed (for constipation) 300 mL 3     lidocaine (LMX4) 4 % external cream Apply topically once as needed for mild pain 120 g 1     lidocaine (LMX4) 4 % external cream Apply 1 Application topically once as needed for mild pain       LINZESS 290 MCG capsule TAKE 1 CAPSULE BY MOUTH EVERY DAY IN THE MORNING BEFORE BREAKFAST 90 capsule 0     LORazepam (ATIVAN) 0.5 MG tablet TAKE 1 TABLET BY MOUTH EVERY 6 HOURS AS NEEDED FOR ANXIETY 10 tablet 0     ondansetron (ZOFRAN-ODT) 8 MG ODT tab Take 1 tablet (8 mg) by mouth every 8 hours as needed for nausea  180 tablet 1     order for DME Equipment being ordered: Trilok brace 8 1/2 left lower extremity 1 Device 0     order for DME Equipment being ordered: size 8 1/2 walking boot tall 1 Device 0     polyethylene glycol (MIRALAX/GLYCOLAX) powder Take 1 capful by mouth 3 times daily       sucralfate (CARAFATE) 1 GM/10ML suspension Take 10 mLs (1 g) by mouth 4 times daily 1200 mL 2     triamcinolone (KENALOG) 0.5 % external ointment Apply 1 g topically 3 times daily as needed for irritation 15 g 3     OTC products: None, except as noted above    Allergies   Allergen Reactions     Amoxil [Penicillins] Rash     Dad unsure of reaction.     Bee Venom Anaphylaxis     Bioflavonoids Anaphylaxis     Citrus Anaphylaxis     All Clinch     Contrast Dye Rash     Contrast Media Ready-Box [facility], 04/09/2014.; Contrast Media Ready-Box [facility], 04/09/2014.  NOTE: this is a contrast media oral with iodine. Premedicate with methylpred standard for IV contrast, request barium contrast for oral contrast.     Diagnostic X-Ray Materials Hives and Rash     Contrast Media Ready-Box [facility], 04/09/2014.; Contrast Media Ready-Box [facility], 04/09/2014.  NOTE: this is a contrast media oral with iodine. Premedicate with methylpred standard for IV contrast, request barium contrast for oral contrast.     Pineapple Anaphylaxis, Difficulty breathing and Rash     Reglan [Metoclopramide] Other (See Comments)     IV dose only, in ER, rapid heart rate.     Ace Inhibitors      Difficulty in breathing and GI upset     Amitiza [Lubiprostone] Nausea and Vomiting     Amoxicillin-Pot Clavulanate      Midazolam Unknown     parent states that when pt takes this medication, she wakes up being very violent .     No Clinical Screening - See Comments      Bleech/ chest tightness, itchy throat, swollen tongue, hives     Tizanidine Other (See Comments)     Confusion, back pain, photophobia, abdominal pain, shaking, anxious       Versed      Coming out of pelvic exam at age of 5, was  kicking and screaming when coming out of the versed.     Adhesive Tape Rash     Azithromycin Hives and Rash     Cephalexin Itching and Rash      Latex Allergy: NO    Social History     Tobacco Use     Smoking status: Never Smoker     Smokeless tobacco: Never Used   Substance Use Topics     Alcohol use: Not Currently     Alcohol/week: 1.0 standard drinks     Types: 1 Standard drinks or equivalent per week     Comment: rarely     History   Drug Use Unknown     Comment: occassional marijuana only, denies others       REVIEW OF SYSTEMS:   CONSTITUTIONAL: NEGATIVE for fever, chills, change in weight  INTEGUMENTARY/SKIN: POSITIVE for Behcets; multiple lesions on hands  EYES: NEGATIVE for vision changes or irritation  ENT/MOUTH: NEGATIVE for ear, mouth and throat problems  RESP: NEGATIVE for significant cough or SOB  BREAST: NEGATIVE for masses, tenderness or discharge  CV: syncope or near-syncope  GI: POSITIVE for abdominal pain generalized, constipation and poor appetite  : NEGATIVE for frequency, dysuria, or hematuria  MUSCULOSKELETAL:POSITIVE  for arthralgias , joint pain  and joint stiffness   NEURO: POSITIVE for dizziness/lightheadedness  ENDOCRINE: NEGATIVE for temperature intolerance, skin/hair changes  PSYCHIATRIC: NEGATIVE for changes in mood or affect    EXAM:   /70   Pulse 105   Temp 98.3  F (36.8  C) (Oral)   Resp 16   Wt 76 kg (167 lb 8 oz)   SpO2 100%   BMI 29.67 kg/m      GENERAL APPEARANCE: healthy, alert and no distress     EYES: EOMI, PERRL     HENT: ear canals and TM's normal and nose and mouth without ulcers or lesions     NECK: cervical adenopathy bilateral     RESP: lungs clear to auscultation - no rales, rhonchi or wheezes     CV: regular rates and rhythm, normal S1 S2, no S3 or S4 and no murmur, click or rub     ABDOMEN:  soft, nontender, no HSM or masses and bowel sounds normal     MS: extremities normal- no gross deformities noted, no evidence of inflammation in joints, FROM in all  extremities.     SKIN: no suspicious lesions or rashes     NEURO: Normal strength and tone, sensory exam grossly normal, mentation intact and speech normal     PSYCH: mentation appears normal. and affect normal/bright    DIAGNOSTICS:     No labs or EKG required for low risk surgery (cataract, skin procedure, breast biopsy, etc)  Labs Resulted Today:   Results for orders placed or performed in visit on 07/27/20   CBC with platelets and differential     Status: Abnormal   Result Value Ref Range    WBC 4.8 4.0 - 11.0 10e9/L    RBC Count 4.78 3.8 - 5.2 10e12/L    Hemoglobin 12.0 11.7 - 15.7 g/dL    Hematocrit 38.5 35.0 - 47.0 %    MCV 81 78 - 100 fl    MCH 25.1 (L) 26.5 - 33.0 pg    MCHC 31.2 (L) 31.5 - 36.5 g/dL    RDW 16.0 (H) 10.0 - 15.0 %    Platelet Count 225 150 - 450 10e9/L    % Neutrophils 49.8 %    % Lymphocytes 40.8 %    % Monocytes 6.3 %    % Eosinophils 2.7 %    % Basophils 0.4 %    Absolute Neutrophil 2.4 1.6 - 8.3 10e9/L    Absolute Lymphocytes 1.9 0.8 - 5.3 10e9/L    Absolute Monocytes 0.3 0.0 - 1.3 10e9/L    Absolute Eosinophils 0.1 0.0 - 0.7 10e9/L    Absolute Basophils 0.0 0.0 - 0.2 10e9/L    Diff Method Automated Method    Comprehensive metabolic panel     Status: Abnormal   Result Value Ref Range    Sodium 136 133 - 144 mmol/L    Potassium 3.6 3.4 - 5.3 mmol/L    Chloride 106 94 - 109 mmol/L    Carbon Dioxide 22 20 - 32 mmol/L    Anion Gap 8 3 - 14 mmol/L    Glucose 100 (H) 70 - 99 mg/dL    Urea Nitrogen 6 (L) 7 - 30 mg/dL    Creatinine 0.69 0.52 - 1.04 mg/dL    GFR Estimate >90 >60 mL/min/[1.73_m2]    GFR Estimate If Black >90 >60 mL/min/[1.73_m2]    Calcium 9.4 8.5 - 10.1 mg/dL    Bilirubin Total 0.5 0.2 - 1.3 mg/dL    Albumin 4.4 3.4 - 5.0 g/dL    Protein Total 7.9 6.8 - 8.8 g/dL    Alkaline Phosphatase 103 40 - 150 U/L    ALT 40 0 - 50 U/L    AST 35 0 - 45 U/L       Recent Labs   Lab Test 02/25/20  1050 12/31/19  1226 12/11/19  1728 09/26/19  1147  11/06/16  1341   HGB 10.4* 11.9 10.8* 11.6*   <  > 13.4    271 230 275   < > 200   INR 1.06  --   --   --   --  0.99   NA  --   --  138 136   < > 142   POTASSIUM 3.9  --  3.5 3.6   < > 3.6   CR 0.64  --  0.63 0.72   < > 0.78    < > = values in this interval not displayed.        IMPRESSION:   Reason for surgery/procedure: [place] teeth extraction  Diagnosis/reason for consult: [place] Tooth Impaction    The proposed surgical procedure is considered LOW risk.    REVISED CARDIAC RISK INDEX  The patient has the following serious cardiovascular risks for perioperative complications such as (MI, PE, VFib and 3  AV Block):  No serious cardiac risks  INTERPRETATION: 0 risks: Class I (very low risk - 0.4% complication rate)    The patient has the following additional risks for perioperative complications:  History of prior anesthesia reactions        ICD-10-CM    1. Preop general physical exam  Z01.818    2. 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 Rheumatoid arthritis, involving unspecified site, unspecified rheumatoid factor presence (H)  M06.9    4. Chronic pain syndrome  G89.4    5. Tourette's syndrome  F95.2    6. Gastroparesis  K31.84 GASTROENTEROLOGY ADULT REF CONSULT ONLY     Lactase 9000 units CHEW   7. Behcet's disease (H)  M35.2    8. Rheumatoid arthritis of multiple sites without rheumatoid factor (H)  M06.09 RHEUMATOLOGY REFERRAL     CBC with platelets and differential     Comprehensive metabolic panel   9. Generalized anxiety disorder  F41.1    10. PTSD (post-traumatic stress disorder)  F43.10    11. Transient alteration of awareness  R40.4 CBC with platelets and differential     Comprehensive metabolic panel   12. TAHIR (generalized anxiety disorder)  F41.1 citalopram (CELEXA) 20 MG tablet   13. Generalized edema  R60.1 furosemide (LASIX) 20 MG tablet   14. Atypical chest pain  R07.89 albuterol (PROAIR HFA/PROVENTIL HFA/VENTOLIN HFA) 108 (90 Base) MCG/ACT inhaler   15. Deep vein thrombosis (DVT) of left upper extremity, unspecified  chronicity, unspecified vein (H)  I82.622        RECOMMENDATIONS:     --Consult hospital rounder / IM to assist post-op medical management    --Patient is to take all scheduled medications on the day of surgery    APPROVAL GIVEN to proceed with proposed procedure, without further diagnostic evaluation       Signed Electronically by: EVI Cardona CNP    Copy of this evaluation report is provided to requesting physician.    [facility] Preop Guidelines    Revised Cardiac Risk Index

## 2020-07-28 ENCOUNTER — OFFICE VISIT (OUTPATIENT)
Dept: PHYSICAL MEDICINE AND REHAB | Facility: CLINIC | Age: 25
End: 2020-07-28
Payer: COMMERCIAL

## 2020-07-28 VITALS — TEMPERATURE: 98 F

## 2020-07-28 DIAGNOSIS — G43.719 CHRONIC MIGRAINE WITHOUT AURA, INTRACTABLE, WITHOUT STATUS MIGRAINOSUS: Primary | ICD-10-CM

## 2020-07-28 NOTE — LETTER
7/28/2020       RE: Samara Oropeza  [address]  [address]  [place] [place] [id number]     Dear Colleague,    Thank you for referring your patient, Samara Oropeza, to the [facility] PHYSICAL MEDICINE AND REHABILITATION at [facility]. Please see a copy of my visit note below.    BOTULINUM TOXIN PROCEDURE - HEADACHE - NOTE    Chief Complaint   Patient presents with     Headache     Botox injections     There were no vitals taken for this visit.     Current Outpatient Medications:      albuterol (PROAIR HFA/PROVENTIL HFA/VENTOLIN HFA) 108 (90 Base) MCG/ACT inhaler, Inhale 2 puffs into the lungs every 6 hours as needed for shortness of breath / dyspnea or wheezing, Disp: 1 Inhaler, Rfl: 1     amitriptyline (ELAVIL) 25 MG tablet, TAKE 1 TABLET BY MOUTH EVERYDAY AT BEDTIME, Disp: 90 tablet, Rfl: 1     artificial tears OINT ophthalmic ointment, 0.5 inch strip each eye at night, Disp: 1 Tube, Rfl: 11     benzocaine (TOPICALE XTRA) 20 % GEL, Apply as needed locally to mouth or nasal ulcers for pain; 4 times daily as needed, Disp: 30 g, Rfl: 1     betamethasone valerate (VALISONE) 0.1 % cream, Apply topically 2 times daily as needed , Disp: , Rfl:      bisacodyl (DULCOLAX) 5 MG EC tablet, Take 2 tablets (10 mg) by mouth daily as needed for constipation, Disp: 30 tablet, Rfl: 0     citalopram (CELEXA) 20 MG tablet, Take 1 tablet (20 mg) by mouth daily, Disp: 90 tablet, Rfl: 1     colchicine (COLCYRS) 0.6 MG tablet, TAKE 1 TABLET (0.6 MG) BY MOUTH 2 TIMES DAILY, Disp: 180 tablet, Rfl: 1     cyproheptadine (PERIACTIN) 4 MG tablet, TAKE 2 TABLETS (8 MG) BY MOUTH AT BEDTIME FOR NIGHTMARES, Disp: 180 tablet, Rfl: 1     dicyclomine (BENTYL) 20 MG tablet, Take 1 tablet (20 mg) by mouth 4 times daily as needed, Disp: 120 tablet, Rfl: 3     diphenhydrAMINE (BENADRYL) 25 MG tablet, Take 1 tablet (25 mg) by mouth 3 times daily as needed (itching), Disp: 40 tablet, Rfl: 1      EPINEPHrine (EPIPEN 2-EAMON) 0.3 MG/0.3ML injection, Inject 0.3 mLs (0.3 mg) into the muscle as needed for anaphylaxis, Disp: 0.6 mL, Rfl: 3     furosemide (LASIX) 20 MG tablet, Take 1 tablet (20 mg) by mouth 3 times daily, Disp: 60 tablet, Rfl: 3     gabapentin (NEURONTIN) 300 MG capsule, TAKE 1 CAPSULE BY MOUTH THREE TIMES A DAY, Disp: 90 capsule, Rfl: 1     guanFACINE (TENEX) 1 MG tablet, TAKE 3 TABLETS (3 MG) BY MOUTH TWICE DAILY IN THE MORNING AND EVENING., Disp: 180 tablet, Rfl: 5     hypromellose (GENTEAL) 0.3 % SOLN, 1 drop every hour as needed for dry eyes , Disp: , Rfl:      Lactase 9000 units CHEW, Take 1 tablet by mouth daily, Disp: 90 tablet, Rfl: 1     lactulose 20 GM/30ML SOLN, Take 30 mLs by mouth 3 times daily as needed (for constipation), Disp: 300 mL, Rfl: 3     lidocaine (LMX4) 4 % external cream, Apply topically once as needed for mild pain, Disp: 120 g, Rfl: 1     lidocaine (LMX4) 4 % external cream, Apply 1 Application topically once as needed for mild pain, Disp: , Rfl:      LINZESS 290 MCG capsule, TAKE 1 CAPSULE BY MOUTH EVERY DAY IN THE MORNING BEFORE BREAKFAST, Disp: 90 capsule, Rfl: 0     LORazepam (ATIVAN) 0.5 MG tablet, TAKE 1 TABLET BY MOUTH EVERY 6 HOURS AS NEEDED FOR ANXIETY, Disp: 10 tablet, Rfl: 0     ondansetron (ZOFRAN-ODT) 8 MG ODT tab, Take 1 tablet (8 mg) by mouth every 8 hours as needed for nausea, Disp: 180 tablet, Rfl: 1     order for DME, Equipment being ordered: [employer] brace 8 1/2 left lower extremity, Disp: 1 Device, Rfl: 0     order for DME, Equipment being ordered: size 8 1/2 walking boot tall, Disp: 1 Device, Rfl: 0     polyethylene glycol (MIRALAX/GLYCOLAX) powder, Take 1 capful by mouth 3 times daily, Disp: , Rfl:      sucralfate (CARAFATE) 1 GM/10ML suspension, Take 10 mLs (1 g) by mouth 4 times daily, Disp: 1200 mL, Rfl: 2     triamcinolone (KENALOG) 0.5 % external ointment, Apply 1 g topically 3 times daily as needed for irritation, Disp: 15 g, Rfl: 3  "    Allergies   Allergen Reactions     Amoxil [Penicillins] Rash     Dad unsure of reaction.     Bee Venom Anaphylaxis     Bioflavonoids Anaphylaxis     Citrus Anaphylaxis     All [place]     Contrast Dye Rash     [place] [facility] [place] [employer] [place] [place].  NOTE: this is a contrast media oral with iodine. Premedicate with methylpred standard for IV contrast, request barium contrast for oral contrast.     Diagnostic X-Ray Materials Hives and Rash     [place]rast [employer] [facility] [place]rast [employer] [facility] [place].  NOTE: this is a contrast media oral with iodine. Premedicate with methylpred standard for IV contrast, request barium contrast for oral contrast.     Pineapple Anaphylaxis, Difficulty breathing and Rash     Reglan [Metoclopramide] Other (See Comments)     IV dose only, in ER, rapid heart rate.     Ace Inhibitors      Difficulty in breathing and GI upset     Amitiza [Lubiprostone] Nausea and Vomiting     Amoxicillin-Pot Clavulanate      Midazolam Unknown     parent states that when pt takes this medication, she wakes up being very violent .     No Clinical Screening - See Comments      Bleech/ chest tightness, itchy throat, swollen tongue, hives     Tizanidine Other (See Comments)     Confusion, back pain, photophobia, abdominal pain, shaking, anxious       Versed      Coming out of pelvic exam at age of 5, was kicking and screaming when coming out of the versed.     Adhesive Tape Rash     Azithromycin Hives and Rash     Cephalexin Itching and Rash        PHYSICAL EXAM:  Pt reports \"small\" headache today, rates 2/10.  Just got through a 3 day migraine.       HPI:  Patient denies new medical diagnoses, illnesses, hospitalizations, emergency room visits, and injuries since the previous injection with botulinum neurotoxin. Scheduled for dental surgery later this week.     Patient reports good benefit from last series of Botox injections, which " only started to wear off about 2 weeks ago.     We reviewed the recommended safety guidelines for  Botox from any vaccine injection, such as the seasonal flu vaccine, by a minimum of 10-14 days with Samara Oropeza. She acknowledged understanding.      RESPONSE TO PREVIOUS TREATMENT:  Change in headache pattern following last series of injections with 185 units of  Botox on 1/8/2020.  Patient reports less benefit and shorter duration of effect with this series of injections.    Post-procedural headache: Rated as 'Mild' severity.  Duration: 3-4 days then completely resolved    1.  Headache frequency during this injection cycle:  1 headache per month. 2 weeks ago as Botox was wearing off she noticed increased headaches occurring every couple of days. This is compared to her baseline headache frequency of 30 headache days per month. Significantly less headaches this injection cycle compared to previous.     2.  Headache duration during this injection cycle:  Headache duration ranged from 2 hours to 3 day.   Patient reports one episodes of multiple day headaches during this injection cycle.    3.  Headache intensity during this injection cycle:    A.  4/10  =  Typical pain level.    B.  9/10  =  Worst pain level.   C.  2/10  =  Lowest pain level.    4.  Change in headache medication usage during this injection cycle:  (For Example:  Able to decrease use of oral pain medications.) Patient reports that she did not utilize any migraine pain medication during this injection cycle, other than Extra Strength Tylenol during her recent 3 day migraine.     5.  ER Visits During This Injection Cycle:  None due to migraine headache.     6.  Functional Performance:  Change in ADL's, social interaction, days lost from work, etc. 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       BOTULINUM NEUROTOXIN INJECTION PROCEDURES:    VERIFICATION OF PATIENT IDENTIFICATION AND PROCEDURE     Initials   Patient Name  [facility]   Patient  [facility]   Procedure Verified by: tracy     Prior to the start of the procedure and with procedural staff participation, I verbally confirmed the patient s identity using two indicators, relevant allergies, that the procedure was appropriate and matched the consent or emergent situation, and that the correct equipment/implants were available. Immediately prior to starting the procedure I conducted the Time Out with the procedural staff and re-confirmed the patient s name, procedure, and site/side. (The Joint Commission universal protocol was followed.)  Yes    Sedation (Moderate or Deep): None    Above assessments performed by:  Sharmila Lynn  PT Care Coordinator    Alejandrina Hernandez MD      INDICATIONS FOR PROCEDURES:  Samara Oropeza is a 24 year old patient with a complex medical history that includes chronic migraine headaches associated with cervicogenic components.      Her baseline symptoms have been recalcitrant to oral medications and conservative therapy.  She is here today for re-evaluation and it was decided to reinject with Botox.     GOAL OF PROCEDURE:  The goal of this procedure is to decrease pain .    TOTAL DOSE USED: 200 units  Dose Administered:  185 units  Botox (Botulinum Toxin Type A)       2:1 Dilution   Diluent Used:  0.25% Sensorcaine  (NDC: 92751-964-10,  Lot: [id number],  Exp: 2023)  Total Volume of Diluent Used:  4 ml  Lot # /C3 with Expiration Date:  2023  NDC #: Botox 100u ([address]-12)    Was there drug waste? Yes  Amount of drug waste (mL): 15 units Botox.  Reason for waste:  Single use vial  Multi-dose vial: No    Sharmila Lynn PT   May 5, 2020    Medication guide was offered to patient and was declined.    CONSENT:  The risks, benefits, and treatment options were discussed with Samara Oropeza and she agreed to proceed.    Written consent was obtained by tracy.     EQUIPMENT USED:  Needle-30 gauge    SKIN PREPARATION:  Skin preparation was performed using  an alcohol wipe.     GUIDANCE DESCRIPTION:  No guidance was utilized.     AREA/MUSCLE INJECTED:  185 UNITS BOTOX = TOTAL DOSE     1 & 2. SHOULDER GIRDLE & NECK MUSCLES: 35 units Botox = Total Dose, 2:1 Dilution      Right Splenius Cervicis - 5 units of Botox at 1 site/s.   Left Splenius Cervicis - 5 units of Botox at 1 site/s.     Right Lateral Trapezius - 7.5 units of Botox at 3 sites/s.  Left Lateral Trapezius - 7.5 units of Botox at 3 site/s.      Right Levator Scapulae - 5 units of Botox at 1 site/s (shoulder muscles).   Left Levator Scapulae - 5 units of Botox at 1 site/s (shoulder muscles).       3. HEAD & SCALP MUSCLES: 150 units Botox = Total Dose, 2:1 Dilution     Right Occipitalis - 5 units of Botox at 3 site/s.   Left Occipitalis - 5 units of Botox at 3 site/s.     Right Frontalis - 15 units of Botox at 4 site/s.  Left Frontalis - 15 units of Botox at 4 site/s.     Right Temporalis - 50 units of Botox at 8 site/s.  Left Temporalis - 50 units of Botox at 8 site/s.     Right  - 2.5 units of Botox at 1 site/s.              Left  - 5 units of Botox at 1 site/s.                 Procerus - 2.5 units of Botox at 1 site/s.      RESPONSE TO PROCEDURE:  Samara Oropeza tolerated the procedure well and there were no immediate complications.   She was allowed to recover for an appropriate period of time and was discharged home in stable condition.    FOLLOW UP:  Samara Oropeza was asked to follow up by phone in 7-14 days with Sharmila Lynn, TAINA, Care Coordinator or Vidya Dickinson RN, Care Coordinator, to report her response to this series of injections.  Based on the patient's previous response to this therapy, Samara Oropeza was rescheduled for the next series of injections in 12 weeks.    PLAN (Medication Changes, Therapy Orders, Work or Disability Issues, etc.):  Patient will continue to monitor response to today's injections.         Again, thank you for allowing me to  participate in the care of your patient.      Sincerely,    Alejandrina Hernandez MD

## 2020-07-28 NOTE — PROGRESS NOTES
BOTULINUM TOXIN PROCEDURE - HEADACHE - NOTE    Chief Complaint   Patient presents with     Headache     Botox injections     There were no vitals taken for this visit.     Current Outpatient Medications:      albuterol (PROAIR HFA/PROVENTIL HFA/VENTOLIN HFA) 108 (90 Base) MCG/ACT inhaler, Inhale 2 puffs into the lungs every 6 hours as needed for shortness of breath / dyspnea or wheezing, Disp: 1 Inhaler, Rfl: 1     amitriptyline (ELAVIL) 25 MG tablet, TAKE 1 TABLET BY MOUTH EVERYDAY AT BEDTIME, Disp: 90 tablet, Rfl: 1     artificial tears OINT ophthalmic ointment, 0.5 inch strip each eye at night, Disp: 1 Tube, Rfl: 11     benzocaine (TOPICALE XTRA) 20 % GEL, Apply as needed locally to mouth or nasal ulcers for pain; 4 times daily as needed, Disp: 30 g, Rfl: 1     betamethasone valerate (VALISONE) 0.1 % cream, Apply topically 2 times daily as needed , Disp: , Rfl:      bisacodyl (DULCOLAX) 5 MG EC tablet, Take 2 tablets (10 mg) by mouth daily as needed for constipation, Disp: 30 tablet, Rfl: 0     citalopram (CELEXA) 20 MG tablet, Take 1 tablet (20 mg) by mouth daily, Disp: 90 tablet, Rfl: 1     colchicine (COLCYRS) 0.6 MG tablet, TAKE 1 TABLET (0.6 MG) BY MOUTH 2 TIMES DAILY, Disp: 180 tablet, Rfl: 1     cyproheptadine (PERIACTIN) 4 MG tablet, TAKE 2 TABLETS (8 MG) BY MOUTH AT BEDTIME FOR NIGHTMARES, Disp: 180 tablet, Rfl: 1     dicyclomine (BENTYL) 20 MG tablet, Take 1 tablet (20 mg) by mouth 4 times daily as needed, Disp: 120 tablet, Rfl: 3     diphenhydrAMINE (BENADRYL) 25 MG tablet, Take 1 tablet (25 mg) by mouth 3 times daily as needed (itching), Disp: 40 tablet, Rfl: 1     EPINEPHrine (EPIPEN 2-EAMON) 0.3 MG/0.3ML injection, Inject 0.3 mLs (0.3 mg) into the muscle as needed for anaphylaxis, Disp: 0.6 mL, Rfl: 3     furosemide (LASIX) 20 MG tablet, Take 1 tablet (20 mg) by mouth 3 times daily, Disp: 60 tablet, Rfl: 3     gabapentin (NEURONTIN) 300 MG capsule, TAKE 1 CAPSULE BY MOUTH THREE TIMES A DAY, Disp: 90  capsule, Rfl: 1     guanFACINE (TENEX) 1 MG tablet, TAKE 3 TABLETS (3 MG) BY MOUTH TWICE DAILY IN THE MORNING AND EVENING., Disp: 180 tablet, Rfl: 5     hypromellose (GENTEAL) 0.3 % SOLN, 1 drop every hour as needed for dry eyes , Disp: , Rfl:      Lactase 9000 units CHEW, Take 1 tablet by mouth daily, Disp: 90 tablet, Rfl: 1     lactulose 20 GM/30ML SOLN, Take 30 mLs by mouth 3 times daily as needed (for constipation), Disp: 300 mL, Rfl: 3     lidocaine (LMX4) 4 % external cream, Apply topically once as needed for mild pain, Disp: 120 g, Rfl: 1     lidocaine (LMX4) 4 % external cream, Apply 1 Application topically once as needed for mild pain, Disp: , Rfl:      LINZESS 290 MCG capsule, TAKE 1 CAPSULE BY MOUTH EVERY DAY IN THE MORNING BEFORE BREAKFAST, Disp: 90 capsule, Rfl: 0     LORazepam (ATIVAN) 0.5 MG tablet, TAKE 1 TABLET BY MOUTH EVERY 6 HOURS AS NEEDED FOR ANXIETY, Disp: 10 tablet, Rfl: 0     ondansetron (ZOFRAN-ODT) 8 MG ODT tab, Take 1 tablet (8 mg) by mouth every 8 hours as needed for nausea, Disp: 180 tablet, Rfl: 1     order for DME, Equipment being ordered: Tril[employer] brace 8 1/2 left lower extremity, Disp: 1 Device, Rfl: 0     order for DME, Equipment being ordered: size 8 1/2 walking boot tall, Disp: 1 Device, Rfl: 0     polyethylene glycol (MIRALAX/GLYCOLAX) powder, Take 1 capful by mouth 3 times daily, Disp: , Rfl:      sucralfate (CARAFATE) 1 GM/10ML suspension, Take 10 mLs (1 g) by mouth 4 times daily, Disp: 1200 mL, Rfl: 2     triamcinolone (KENALOG) 0.5 % external ointment, Apply 1 g topically 3 times daily as needed for irritation, Disp: 15 g, Rfl: 3     Allergies   Allergen Reactions     Amoxil [Penicillins] Rash     Dad unsure of reaction.     Bee Venom Anaphylaxis     Bioflavonoids Anaphylaxis     Citrus Anaphylaxis     All [place]     Contrast Dye Rash     Contrast [facility] [place] [place]rast [facility] [place]  NOTE: this is a contrast media oral with iodine.  "Premedicate with methylpred standard for IV contrast, request barium contrast for oral contrast.     Diagnostic X-Ray Materials Hives and Rash     Contrast Media Ready[employer] [place] 04[place]09[place].; Contrast Media Ready-[employer] [facility] [place] 04[place].  NOTE: this is a contrast media oral with iodine. Premedicate with methylpred standard for IV contrast, request barium contrast for oral contrast.     Pineapple Anaphylaxis, Difficulty breathing and Rash     Reglan [Metoclopramide] Other (See Comments)     IV dose only, in ER, rapid heart rate.     Ace Inhibitors      Difficulty in breathing and GI upset     Amitiza [Lubiprostone] Nausea and Vomiting     Amoxicillin-Pot Clavulanate      Midazolam Unknown     parent states that when pt takes this medication, she wakes up being very violent .     No Clinical Screening - See Comments      Bleech/ chest tightness, itchy throat, swollen tongue, hives     Tizanidine Other (See Comments)     Confusion, back pain, photophobia, abdominal pain, shaking, anxious       Versed      Coming out of pelvic exam at age of 5, was kicking and screaming when coming out of the versed.     Adhesive Tape Rash     Azithromycin Hives and Rash     Cephalexin Itching and Rash        PHYSICAL EXAM:  Pt reports \"small\" headache today, rates 2/10.  Just got through a 3 day migraine.       HPI:  Patient denies new medical diagnoses, illnesses, hospitalizations, emergency room visits, and injuries since the previous injection with botulinum neurotoxin. Scheduled for dental surgery later this week.     Patient reports good benefit from last series of Botox injections, which only started to wear off about 2 weeks ago.     We reviewed the recommended safety guidelines for  Botox from any vaccine injection, such as the seasonal flu vaccine, by a minimum of 10-14 days with Samara Oropeza. She acknowledged understanding.      RESPONSE TO PREVIOUS TREATMENT:  Change in headache pattern following last series of " injections with 185 units of  Botox on 2020.  Patient reports less benefit and shorter duration of effect with this series of injections.    Post-procedural headache: Rated as 'Mild' severity.  Duration: 3-4 days then completely resolved    1.  Headache frequency during this injection cycle:  1 headache per month. 2 weeks ago as Botox was wearing off she noticed increased headaches occurring every couple of days. This is compared to her baseline headache frequency of 30 headache days per month. Significantly less headaches this injection cycle compared to previous.     2.  Headache duration during this injection cycle:  Headache duration ranged from 2 hours to 3 day.   Patient reports one episodes of multiple day headaches during this injection cycle.    3.  Headache intensity during this injection cycle:    A.  4/10  =  Typical pain level.    B.  9/10  =  Worst pain level.   C.  2/10  =  Lowest pain level.    4.  Change in headache medication usage during this injection cycle:  (For Example:  Able to decrease use of oral pain medications.) Patient reports that she did not utilize any migraine pain medication during this injection cycle, other than Extra Strength Tylenol during her recent 3 day migraine.     5.  ER Visits During This Injection Cycle:  None due to migraine headache.     6.  Functional Performance:  Change in ADL's, social interaction, days lost from work, etc. 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       BOTULINUM NEUROTOXIN INJECTION PROCEDURES:    VERIFICATION OF PATIENT IDENTIFICATION AND PROCEDURE     Initials   Patient Name tracy   Patient  [facility]   Procedure Verified by: tracy     Prior to the start of the procedure and with procedural staff participation, I verbally confirmed the patient s identity using two indicators, relevant allergies, that the procedure was appropriate and matched the consent or emergent situation, and that the correct equipment/implants were  available. Immediately prior to starting the procedure I conducted the Time Out with the procedural staff and re-confirmed the patient s name, procedure, and site/side. (The Joint Commission universal protocol was followed.)  Yes    Sedation (Moderate or Deep): None    Above assessments performed by:  Sharmila Lynn,  PT Care Coordinator    Alejandrina Hernandez MD      INDICATIONS FOR PROCEDURES:  Samara Oropeza is a 24 year old patient with a complex medical history that includes chronic migraine headaches associated with cervicogenic components.      Her baseline symptoms have been recalcitrant to oral medications and conservative therapy.  She is here today for re-evaluation and it was decided to reinject with Botox.     GOAL OF PROCEDURE:  The goal of this procedure is to decrease pain .    TOTAL DOSE USED: 200 units  Dose Administered:  185 units  Botox (Botulinum Toxin Type A)       2:1 Dilution   Diluent Used:  0.25% Sensorcaine  (NDC: 94590-904-54,  Lot: [id number],  Exp: 2/2023)  Total Volume of Diluent Used:  4 ml  Lot # /C3 with Expiration Date:  2/2023  NDC #: Botox 100u ([address]-36)    Was there drug waste? Yes  Amount of drug waste (mL): 15 units Botox.  Reason for waste:  Single use vial  Multi-dose vial: No    Sharmila Lynn, PT   May 5, 2020    Medication guide was offered to patient and was declined.    CONSENT:  The risks, benefits, and treatment options were discussed with Samara Oropeza and she agreed to proceed.    Written consent was obtained by [facility].     EQUIPMENT USED:  Needle-30 gauge    SKIN PREPARATION:  Skin preparation was performed using an alcohol wipe.     GUIDANCE DESCRIPTION:  No guidance was utilized.     AREA/MUSCLE INJECTED:  185 UNITS BOTOX = TOTAL DOSE     1 & 2. SHOULDER GIRDLE & NECK MUSCLES: 35 units Botox = Total Dose, 2:1 Dilution      Right Splenius Cervicis - 5 units of Botox at 1 site/s.   Left Splenius Cervicis - 5 units of Botox at 1 site/s.     Right  Lateral Trapezius - 7.5 units of Botox at 3 sites/s.  Left Lateral Trapezius - 7.5 units of Botox at 3 site/s.      Right Levator Scapulae - 5 units of Botox at 1 site/s (shoulder muscles).   Left Levator Scapulae - 5 units of Botox at 1 site/s (shoulder muscles).       3. HEAD & SCALP MUSCLES: 150 units Botox = Total Dose, 2:1 Dilution     Right Occipitalis - 5 units of Botox at 3 site/s.   Left Occipitalis - 5 units of Botox at 3 site/s.     Right Frontalis - 15 units of Botox at 4 site/s.  Left Frontalis - 15 units of Botox at 4 site/s.     Right Temporalis - 50 units of Botox at 8 site/s.  Left Temporalis - 50 units of Botox at 8 site/s.     Right  - 2.5 units of Botox at 1 site/s.              Left  - 5 units of Botox at 1 site/s.                 Procerus - 2.5 units of Botox at 1 site/s.      RESPONSE TO PROCEDURE:  Samara Oropeza tolerated the procedure well and there were no immediate complications.   She was allowed to recover for an appropriate period of time and was discharged home in stable condition.    FOLLOW UP:  Samara Oropeza was asked to follow up by phone in 7-14 days with Sharmila Lynn PT, Care Coordinator or Vidya Dickinson RN, Care Coordinator, to report her response to this series of injections.  Based on the patient's previous response to this therapy, Samara Oropeza was rescheduled for the next series of injections in 12 weeks.    PLAN (Medication Changes, Therapy Orders, Work or Disability Issues, etc.):  Patient will continue to monitor response to today's injections.

## 2020-08-03 RX ORDER — BUPIVACAINE HYDROCHLORIDE 2.5 MG/ML
4 INJECTION, SOLUTION EPIDURAL; INFILTRATION; INTRACAUDAL ONCE
Status: COMPLETED | OUTPATIENT
Start: 2020-08-03 | End: 2020-08-03

## 2020-08-03 RX ADMIN — BUPIVACAINE HYDROCHLORIDE 10 MG: 2.5 INJECTION, SOLUTION EPIDURAL; INFILTRATION; INTRACAUDAL at 12:35

## 2020-08-17 NOTE — PROGRESS NOTES
Pt here for neuro followu p  Last seen in feb for her MS  Pt has recovered from uri sxs in the winter  Pt had covid ab testing and neg  Pt working thru pandemic  Pt notes no close covid contacts  Pt well aware of cdc recommendations in setting of covid  Pt exam stable  Pt remains on copaxone brand tiw  matt med [employer]  Labs from 8/13 good  Pt to call for any new sxs This is a recent snapshot of the patient's [facility] medical record.  For current drug dose and complete information and questions, call [phone] or In Basket pool, [facility]v home infusion ([id number])  CSN Number:  [id number]

## 2020-08-19 ENCOUNTER — OFFICE VISIT - HEALTHEAST (OUTPATIENT)
Dept: BEHAVIORAL HEALTH | Facility: CLINIC | Age: 25
End: 2020-08-19

## 2020-08-19 DIAGNOSIS — F41.1 GENERALIZED ANXIETY DISORDER: ICD-10-CM

## 2020-08-23 DIAGNOSIS — F95.9 TIC: ICD-10-CM

## 2020-08-23 DIAGNOSIS — F95.2 TOURETTE'S SYNDROME: Primary | ICD-10-CM

## 2020-08-25 RX ORDER — GUANFACINE 1 MG/1
TABLET ORAL
Qty: 180 TABLET | Refills: 5 | Status: SHIPPED | OUTPATIENT
Start: 2020-08-25 | End: 2020-09-02

## 2020-08-25 NOTE — TELEPHONE ENCOUNTER
Prescription approved per [facility] Refill Protocol.    Caitlyn Bosch, RN, BSN, PHN  [facility]: [place]

## 2020-09-02 ENCOUNTER — OFFICE VISIT - HEALTHEAST (OUTPATIENT)
Dept: BEHAVIORAL HEALTH | Facility: CLINIC | Age: 25
End: 2020-09-02

## 2020-09-02 DIAGNOSIS — F41.1 GENERALIZED ANXIETY DISORDER: ICD-10-CM

## 2020-09-02 DIAGNOSIS — F32.A DEPRESSION, UNSPECIFIED DEPRESSION TYPE: ICD-10-CM

## 2020-09-02 DIAGNOSIS — F95.9 TIC: ICD-10-CM

## 2020-09-02 RX ORDER — GUANFACINE 1 MG/1
TABLET ORAL
Qty: 180 TABLET | Refills: 5 | Status: CANCELLED | OUTPATIENT
Start: 2020-09-02

## 2020-09-02 NOTE — TELEPHONE ENCOUNTER
Sonja,  See bold below.     Spoke with [facility] at [facility] regarding  Guanfacine (Tenex). The current Rx is for 6 tablets a day. Insurance only covers 4 tables a day. Pharmacist was able to fill Rx for 2mg tablets, take one and one half tabs two times daily.     Pt has been out meds. She did not take evening dose on 9/1 and morning dose on 9/2.     Pharmacist indicated that Pt can resume her normal dosing if she only missed a couple days, but if she missed a week would need to discuss with provider to determine restarting and dose.     Pt notified of med changes and dosing changes. Pharmacy will fill today so Pt could potentially take today morning dose still.     Please review and sign pended script so it is on file.  Did not have to do a prior auth with 1mg dose since pharmacist was able to change to 2mg and went  through insurance.     Elsy Hardin RN   [facility]

## 2020-09-03 ENCOUNTER — MYC MEDICAL ADVICE (OUTPATIENT)
Dept: FAMILY MEDICINE | Facility: CLINIC | Age: 25
End: 2020-09-03

## 2020-09-03 RX ORDER — GUANFACINE 2 MG/1
TABLET ORAL
Qty: 270 TABLET | Refills: 3 | Status: SHIPPED | OUTPATIENT
Start: 2020-09-03 | End: 2022-10-28

## 2020-09-04 ENCOUNTER — TELEPHONE (OUTPATIENT)
Dept: FAMILY MEDICINE | Facility: CLINIC | Age: 25
End: 2020-09-04

## 2020-09-04 NOTE — TELEPHONE ENCOUNTER
Reason for call:  Form   Our goal is to have forms completed within 72 hours, however some forms may require a visit or additional information.     Who is the form from? Insurance comp  Where did the form come from? form was faxed in  What clinic location was the form placed at?   Where was the form placed? Given to physician  What number is listed as a contact on the form?     Phone call message - patient request for a letter, form or note:     Date needed: as soon as possible  Please fax to [phone]  Has the patient signed a consent form for release of information? NO    Additional comments:     Type of letter, form or note: disability    Phone number to reach patient:  Home number on file [phone] (home)    Best Time:  unknown    Can we leave a detailed message on this number?  Not Applicable    Travel screening: Not Applicable

## 2020-09-06 ENCOUNTER — NURSE TRIAGE (OUTPATIENT)
Dept: NURSING | Facility: CLINIC | Age: 25
End: 2020-09-06

## 2020-09-07 ENCOUNTER — HOSPITAL ENCOUNTER (EMERGENCY)
Facility: CLINIC | Age: 25
Discharge: HOME | End: 2020-09-07
Attending: EMERGENCY MEDICINE | Admitting: EMERGENCY MEDICINE
Payer: COMMERCIAL

## 2020-09-07 ENCOUNTER — APPOINTMENT (OUTPATIENT)
Dept: GENERAL RADIOLOGY | Facility: CLINIC | Age: 25
End: 2020-09-07
Attending: EMERGENCY MEDICINE
Payer: COMMERCIAL

## 2020-09-07 VITALS
RESPIRATION RATE: 18 BRPM | TEMPERATURE: 99 F | DIASTOLIC BLOOD PRESSURE: 87 MMHG | OXYGEN SATURATION: 99 % | HEART RATE: 90 BPM | SYSTOLIC BLOOD PRESSURE: 120 MMHG

## 2020-09-07 DIAGNOSIS — R42 DIZZINESS: ICD-10-CM

## 2020-09-07 DIAGNOSIS — T50.905A ADVERSE EFFECT OF DRUG, INITIAL ENCOUNTER: ICD-10-CM

## 2020-09-07 DIAGNOSIS — R55 SYNCOPE, UNSPECIFIED SYNCOPE TYPE: ICD-10-CM

## 2020-09-07 LAB
ALBUMIN UR-MCNC: NEGATIVE MG/DL
ANION GAP SERPL CALCULATED.3IONS-SCNC: 5 MMOL/L (ref 3–14)
APPEARANCE UR: CLEAR
BACTERIA #/AREA URNS HPF: ABNORMAL /HPF
BILIRUB UR QL STRIP: NEGATIVE
BUN SERPL-MCNC: 7 MG/DL (ref 7–30)
CALCIUM SERPL-MCNC: 9.8 MG/DL (ref 8.5–10.1)
CHLORIDE SERPL-SCNC: 106 MMOL/L (ref 94–109)
CO2 SERPL-SCNC: 27 MMOL/L (ref 20–32)
COLOR UR AUTO: ABNORMAL
CREAT SERPL-MCNC: 0.79 MG/DL (ref 0.52–1.04)
D DIMER PPP FEU-MCNC: 0.3 UG/ML FEU (ref 0–0.5)
ERYTHROCYTE [DISTWIDTH] IN BLOOD BY AUTOMATED COUNT: 15 % (ref 10–15)
GFR SERPL CREATININE-BSD FRML MDRD: >90 ML/MIN/{1.73_M2}
GLUCOSE SERPL-MCNC: 104 MG/DL (ref 70–99)
GLUCOSE UR STRIP-MCNC: NEGATIVE MG/DL
HCG SERPL QL: NEGATIVE
HCT VFR BLD AUTO: 43 % (ref 35–47)
HGB BLD-MCNC: 13 G/DL (ref 11.7–15.7)
HGB UR QL STRIP: NEGATIVE
KETONES UR STRIP-MCNC: NEGATIVE MG/DL
LEUKOCYTE ESTERASE UR QL STRIP: NEGATIVE
MCH RBC QN AUTO: 25.5 PG (ref 26.5–33)
MCHC RBC AUTO-ENTMCNC: 30.2 G/DL (ref 31.5–36.5)
MCV RBC AUTO: 85 FL (ref 78–100)
MUCOUS THREADS #/AREA URNS LPF: PRESENT /LPF
NITRATE UR QL: NEGATIVE
PH UR STRIP: 7.5 PH (ref 5–7)
PLATELET # BLD AUTO: 247 10E9/L (ref 150–450)
POTASSIUM SERPL-SCNC: 3.9 MMOL/L (ref 3.4–5.3)
RBC # BLD AUTO: 5.09 10E12/L (ref 3.8–5.2)
RBC #/AREA URNS AUTO: 0 /HPF (ref 0–2)
SODIUM SERPL-SCNC: 138 MMOL/L (ref 133–144)
SOURCE: ABNORMAL
SP GR UR STRIP: 1.01 (ref 1–1.03)
SQUAMOUS #/AREA URNS AUTO: <1 /HPF (ref 0–1)
TROPONIN I SERPL-MCNC: <0.015 UG/L (ref 0–0.04)
TSH SERPL DL<=0.005 MIU/L-ACNC: 0.77 MU/L (ref 0.4–4)
UROBILINOGEN UR STRIP-MCNC: NORMAL MG/DL (ref 0–2)
WBC # BLD AUTO: 4.1 10E9/L (ref 4–11)
WBC #/AREA URNS AUTO: 0 /HPF (ref 0–5)

## 2020-09-07 PROCEDURE — 81001 URINALYSIS AUTO W/SCOPE: CPT | Performed by: EMERGENCY MEDICINE

## 2020-09-07 PROCEDURE — 93005 ELECTROCARDIOGRAM TRACING: CPT

## 2020-09-07 PROCEDURE — 96374 THER/PROPH/DIAG INJ IV PUSH: CPT

## 2020-09-07 PROCEDURE — 84484 ASSAY OF TROPONIN QUANT: CPT | Performed by: EMERGENCY MEDICINE

## 2020-09-07 PROCEDURE — 25000128 H RX IP 250 OP 636: Performed by: EMERGENCY MEDICINE

## 2020-09-07 PROCEDURE — 85027 COMPLETE CBC AUTOMATED: CPT | Performed by: EMERGENCY MEDICINE

## 2020-09-07 PROCEDURE — 80048 BASIC METABOLIC PNL TOTAL CA: CPT | Performed by: EMERGENCY MEDICINE

## 2020-09-07 PROCEDURE — 96361 HYDRATE IV INFUSION ADD-ON: CPT

## 2020-09-07 PROCEDURE — 84703 CHORIONIC GONADOTROPIN ASSAY: CPT | Performed by: EMERGENCY MEDICINE

## 2020-09-07 PROCEDURE — 71046 X-RAY EXAM CHEST 2 VIEWS: CPT

## 2020-09-07 PROCEDURE — 85379 FIBRIN DEGRADATION QUANT: CPT | Performed by: EMERGENCY MEDICINE

## 2020-09-07 PROCEDURE — 25800030 ZZH RX IP 258 OP 636: Performed by: EMERGENCY MEDICINE

## 2020-09-07 PROCEDURE — 99285 EMERGENCY DEPT VISIT HI MDM: CPT | Mod: 25

## 2020-09-07 PROCEDURE — 84443 ASSAY THYROID STIM HORMONE: CPT | Performed by: EMERGENCY MEDICINE

## 2020-09-07 RX ORDER — ONDANSETRON 4 MG/1
4 TABLET, ORALLY DISINTEGRATING ORAL EVERY 6 HOURS PRN
Qty: 10 TABLET | Refills: 0 | Status: SHIPPED | OUTPATIENT
Start: 2020-09-07 | End: 2020-09-10

## 2020-09-07 RX ORDER — ONDANSETRON 2 MG/ML
8 INJECTION INTRAMUSCULAR; INTRAVENOUS ONCE
Status: COMPLETED | OUTPATIENT
Start: 2020-09-07 | End: 2020-09-07

## 2020-09-07 RX ADMIN — SODIUM CHLORIDE 1000 ML: 9 INJECTION, SOLUTION INTRAVENOUS at 12:00

## 2020-09-07 RX ADMIN — ONDANSETRON 8 MG: 2 INJECTION INTRAMUSCULAR; INTRAVENOUS at 11:59

## 2020-09-07 ASSESSMENT — ENCOUNTER SYMPTOMS
RESPIRATORY NEGATIVE: 1
VOMITING: 1
SEIZURES: 0
FEVER: 1
DIFFICULTY URINATING: 1
DIZZINESS: 1
MYALGIAS: 1
NAUSEA: 1
TREMORS: 1
LIGHT-HEADEDNESS: 1
HEADACHES: 1
PALPITATIONS: 1
DIAPHORESIS: 1

## 2020-09-07 NOTE — ED AVS SNAPSHOT
[facility] Emergency Department  [address]  [facility] [address]  Phone:  [phone]  Fax:  [phone]                                    Samara Oropeza   MRN: [id number]    Department:  [facility] Emergency Department   Date of Visit:  9/7/2020           After Visit Summary Signature Page    I have received my discharge instructions, and my questions have been answered. I have discussed any challenges I see with this plan with the nurse or doctor.    ..........................................................................................................................................  Patient/Patient Representative Signature      ..........................................................................................................................................  Patient Representative Print Name and Relationship to Patient    ..................................................               ................................................  Date                                   Time    ..........................................................................................................................................  Reviewed by Signature/Title    ...................................................              ..............................................  Date                                               Time          22EPIC Rev 08/18

## 2020-09-07 NOTE — ED PROVIDER NOTES
"  History   Chief Complaint  Medication Complications    HPI   Samara Oropeza is a 24 year old female with a history of Anxiety, POTS, Behcet's disease, fibromyalgia, gastroparesis, POTS, Tourette's, and Depression who presents for evaluation of complications after a recent dose increase to her Tenex. Samara notes that her prescription increased from 3 mg BID to 3.5 mg BID on Friday (3 days prior to arrival). She had mistakenly took 6 mg BID instead of 3.5 mg. Since then, she has experienced headache, fever of 100.8 one time, and tinnitus. Samara reports several episodes of syncope after lightheadedness and feeling flushed with the last episode 2 days prior. These episodes lasted several minutes and have been witnessed by her boyfriend who denies any seizure-like activity. Samara also notes two episodes of emesis along with ongoing nausea, myalgia, diaphoresis, and tremors.  Her last episode of syncope, she became lightheaded and tinnitus for a couple minutes. She states she has had witnessed syncope that she believes is lasting a couple minutes with no reported seizure activity at that time. She denies any episodes of loss of bowel or bladder control with these. She also notes some palpitations and chest discomfort since Friday which is constant, substernal and non-radiating. She has a history of upper extremity of DVT. She was placed on blood thinners which she is no longer taking. No hemoptysis. She reports a history of \"sporadic tachycardia\" as well as POTS. Samara spoke with her physician on Saturday about her symptoms and since stopped taking her Tenex. She last took her the dose 2 days prior. However she reports no other syncopal episodes since then, however with continuation of all other symptoms.    Allergies  Amoxil [Penicillins]  Bee Venom  Bioflavonoids  Citrus  Contrast Dye  Diagnostic X-Ray Materials  Pineapple  Reglan [Metoclopramide]  Ace Inhibitors  Amitiza " [Lubiprostone]  Amoxicillin-Pot Clavulanate  Midazolam  No Clinical Screening - See Comments  Tizanidine  Versed  Adhesive Tape  Azithromycin  Cephalexin    Medications  Prilosec  Amitriptyline  Guanfacine  Zantac  Albuterol  Celexa  Aspirin 81 mg  Neurontin  Periactin  Atarax  Lasix  Sprintec  Otezla  apremilast  Ativan  Linzes  Cataflam  Bentyl  Cleocin  Tenex    Past Medical History  Anemia   Anxiety  Arthritis   Depression  Behcet's disease   Cervical adenitis   Chronic abdominal pain   Constipation, chronic   Fibromyalgia   Gastro-oesophageal reflux disease   Gastroparesis   Irregular heart beat   Migraines   Neuromuscular disorder  Tourette's     Past Surgical History  Left ankle arthroscopy  Left ankle ligament repair  Arthroscopy knee  Colonoscopy  Teeth removal  Endoscopy, colonoscopy combined  Picc placement  I and D left foot  I and D lower extremity, left    Family History  Depression  Migraines  Alcohol/drug dependence  Hypertension  osteoarthritis    Social History  Tobacco use: never  Alcohol use: not currently  Drug use: not currently, [facility]  Marital Status: single    Review of Systems   Constitutional: Positive for diaphoresis and fever.   HENT: Positive for tinnitus.    Respiratory: Negative.    Cardiovascular: Positive for chest pain and palpitations.   Gastrointestinal: Positive for nausea and vomiting.   Genitourinary: Positive for difficulty urinating.   Musculoskeletal: Positive for myalgias.   Neurological: Positive for dizziness, tremors, syncope, light-headedness and headaches. Negative for seizures.   All other systems reviewed and are negative.      Physical Exam     Patient Vitals for the past 24 hrs:   BP Temp Temp src Pulse Resp SpO2   09/07/20 1200 (!) 133/91 -- -- -- -- 100 %   09/07/20 1116 126/89 99  F (37.2  C) Oral 129 18 100 %       Physical Exam    General:   Pleasant, age appropriate female.  HEENT:    Oropharynx is moist, without lesions or trismus.  Eyes:    Conjunctiva  normal  Neck:    Supple, no meningismus.     CV:     Tachycardic, regular rhythm.      No murmurs, rubs or gallops.       No unilateral leg swelling.       2+ radial pulses bilateral.       No lower extremity edema.  PULM:    Clear to auscultation bilateral.       No respiratory distress.      Good air exchange.     No rales or wheezing.     No stridor.  ABD:    Soft, non-tender, non-distended.       No pulsatile masses.       No rebound, guarding or rigidity.  MSK:     No gross deformity to all four extremities.   LYMPH:   No cervical lymphadenopathy.  NEURO:   Alert & O x 3.     Speech is clear with no aphasia.       Strength is 5/5 in all 4 extremities.  Sensation is intact.       Normal muscular tone, no tremor.  Skin:    Warm, dry and intact.    Psych:    Mood is good and affect is appropriate.          Emergency Department Course   EKG  Indication: Palpitations  Time: 1154  Rate 107 bpm. PA interval 156. QRS duration 68. QT/QTc 320/427.   Sinus tachycardia  Otherwise normal ECG   No significant change as compared to prior, dated 12/11/19.  Read time: 1200  Read by Miguel Steele MD    Imaging:  Radiology findings were communicated with the patient who voiced understanding of the findings.    Chest XR,  PA & LAT  IMPRESSION: No acute cardiopulmonary disease.    Readings per Radiology    Laboratory:  Laboratory findings were communicated with the patient who voiced understanding of the findings.    CBC: WBC: 4.1, HGB: 13.0, PLT: 247  BMP: Glucose 104 (high), o/w WNL (Creatinine: 0.79)  HCG: Negative  D dimer: 0.3  Troponin I (Collected at 1147): <0.015  TSH: 0.77    UA with Microscopic: pH: 7.5 (high), Bacteria: Few (A), Mucous: Presents (A), o/w WNL    Interventions:  1159 Zofran 8 mg IV  1200 NS 1L IV    Emergency Department Course:  Past medical records, nursing notes, and vitals reviewed.    1136 I physically examined the patient as documented above.    EKG obtained in the ED, see results above.   IV  was inserted and blood was drawn for laboratory testing, results above.  The patient provided a urine sample here in the emergency department. This was sent for laboratory testing, findings above.  The patient was sent for radiographs while in the emergency department, results above.     1303 I consulted with, Poison Control, regarding the patient's history and presentation here in the emergency department.    1350 I rechecked the patient and discussed the findings of their workup thus far.     Findings and plan explained to the Patient. Patient discharged home with instructions regarding supportive care, medications, and reasons to return. The importance of close follow-up was reviewed. The patient was prescribed Zofran.    I personally reviewed the laboratory and imaging results with the Patient and answered all related questions prior to discharge.     Impression & Plan   Medical Decision Makin-year-old female presented to the ED with syncope, headache and dizziness after mistakenly overdosing on her guanfacine.  She was supposed to be taking 3.5 mg twice daily but was taking 6 mg twice daily.  Fortunately her last dose was greater than 24 hours prior to arrival.  Her EKG reveals no cardiac conduction delay/AV block or dysrhythmia.  Basic laboratory studies are reassuring.  Guanfacine can cause bradycardia, AV block and hypotension which is the likely source of her recurrent syncope.  I did speak with poison control who did not recommend any further observation given the timing of her last guanfacine dosing.  She has residual symptoms related to the adverse drug effect.  Patient is safe for discharge home.  It is reasonable for her to reinitiate her prior dose of 3 mg twice daily as she had tolerated this quite well for a prolonged period of time thus likelihood of adverse drug effect or recurrent syncope/dysrhythmia would be quite low.    Diagnosis:    ICD-10-CM    1. Dizziness  R42    2. Syncope,  unspecified syncope type  R55    3. Adverse effect of drug, initial encounter  T50.039G        Disposition:  Discharged to home.    Discharge Medications:  New Prescriptions    ONDANSETRON (ZOFRAN ODT) 4 MG ODT TAB    Take 1 tablet (4 mg) by mouth every 6 hours as needed for nausea       Scribe Disclosure:  Ryan ROJO, am serving as a scribe at 11:36 AM on 9/7/2020 to document services personally performed by Miguel Steele MD based on my observations and the provider's statements to me.      Miguel Steele MD  09/07/20 3827

## 2020-09-07 NOTE — ED TRIAGE NOTES
Pt arrives with mother for dizziness, tachycardia, and medication overdose. Pt on Tenex, had dose increase and did not realize it, so took same amount of pills. Starting Thursday pt starting fainting spells and dizziness. Stopped taking med on Saturday. Tachycardic in triage, ABCs otherwise intact.

## 2020-09-08 LAB — INTERPRETATION ECG - MUSE: NORMAL

## 2020-09-15 ENCOUNTER — OFFICE VISIT (OUTPATIENT)
Dept: PODIATRY | Facility: CLINIC | Age: 25
End: 2020-09-15
Payer: COMMERCIAL

## 2020-09-15 VITALS
BODY MASS INDEX: 29.59 KG/M2 | HEIGHT: 63 IN | DIASTOLIC BLOOD PRESSURE: 72 MMHG | WEIGHT: 167 LBS | SYSTOLIC BLOOD PRESSURE: 114 MMHG

## 2020-09-15 DIAGNOSIS — G89.29 CHRONIC PAIN OF LEFT ANKLE: ICD-10-CM

## 2020-09-15 DIAGNOSIS — M25.562 CHRONIC PAIN OF LEFT KNEE: Primary | ICD-10-CM

## 2020-09-15 DIAGNOSIS — G89.29 CHRONIC PAIN OF LEFT KNEE: Primary | ICD-10-CM

## 2020-09-15 DIAGNOSIS — M25.572 CHRONIC PAIN OF LEFT ANKLE: ICD-10-CM

## 2020-09-15 PROCEDURE — 99213 OFFICE O/P EST LOW 20 MIN: CPT | Mod: 25 | Performed by: PODIATRIST

## 2020-09-15 PROCEDURE — 20605 DRAIN/INJ JOINT/BURSA W/O US: CPT | Mod: LT | Performed by: PODIATRIST

## 2020-09-15 RX ORDER — TRIAMCINOLONE ACETONIDE 40 MG/ML
40 INJECTION, SUSPENSION INTRA-ARTICULAR; INTRAMUSCULAR
Status: SHIPPED | OUTPATIENT
Start: 2020-09-15

## 2020-09-15 RX ORDER — LIDOCAINE HYDROCHLORIDE 10 MG/ML
0.5 INJECTION, SOLUTION INFILTRATION; PERINEURAL
Status: DISCONTINUED | OUTPATIENT
Start: 2020-09-15 | End: 2023-02-12

## 2020-09-15 RX ADMIN — LIDOCAINE HYDROCHLORIDE 0.5 ML: 10 INJECTION, SOLUTION INFILTRATION; PERINEURAL at 15:54

## 2020-09-15 RX ADMIN — TRIAMCINOLONE ACETONIDE 40 MG: 40 INJECTION, SUSPENSION INTRA-ARTICULAR; INTRAMUSCULAR at 15:54

## 2020-09-15 ASSESSMENT — MIFFLIN-ST. JEOR: SCORE: 1471.64

## 2020-09-15 NOTE — PROGRESS NOTES
"Foot & Ankle Surgery   September 15, 2020    S:  Pt is seen today for evaluation of left ankle/lower leg pain.  Most recent left ankle surgery 9/25/19, including left ankle arthroscopy and open anterior talofibular ligament repair with Arthrex Internal Brace, as well as removal of non-viable inflammatory tissue over anterolateral ankle.  She hadn't been able to do PT as of 12/26/19 appointment, but states she subsequently did 10-12 sessions.  Ankle feels unsteady still.  Points to anterior ankle as area of pain.  Has had a few repeat injuries to the ankle since her last visit with us. She's also reporting some numbness/pain across the dorsal foot.  Previous left knee surgery, including patellar tendon repair, she was having episodes where \"a bone would pop out\" and she'd have pain radiating from the anterolateral knee down to the ankle/foot, with similar pain/paresthesias.  This went away after her first ankle surgery, but she's noticed a recurrence of these episodes.    Vitals:    09/15/20 1517   BP: 114/72   Weight: 75.8 kg (167 lb)   Height: 1.6 m (5' 3\")   '      ROS - Pos for CC.  Patient denies current nausea, vomiting, chills, fevers, belly pain, calf pain, chest pain or SOB.  Complete remainder of ROS it otherwise neg.      PE:  Gen:   No apparent distress  Eye:    Visual scanning without deficit  Ear:    Response to auditory stimuli wnl  Lung:    Non-labored breathing on RA noted  Abd:    NTND per patient report  Lymph:    Neg for pitting/non-pitting edema BLE  Vasc:    Pulses palpable, CFT minimally delayed  Neuro:    Light touch sensation intact to all sensory nerve distributions with paresthesias dorsal foot  Derm:    Neg for nodules, lesions or ulcerations  MSK:    Left lower extremity - diffuse pain throughout left lower extremity from knee-to-ankle.  Tender anterolateral ankle, tender at fibular head.  Very tender at medial recess and anterior gutter.  Discomfort/guarding with anterior drawer, " although no significant instability appreciated  Calf:    Neg for redness, swelling or tenderness    Assessment:  24 year old female with recurrent ankle pain/instability sp multiple reconstructive ankle surgeries, most recent ankle scope and open ATFL repair, as well as recurrent foot/knee pain sp previous left knee patellar tendon reconstruction      Plan:  Discussed etiologies, anatomy and options  1.  recurrent ankle pain/instability sp multiple reconstructive ankle surgeries, most recent ankle scope and open ATFL repair, as well as recurrent foot/knee pain sp previous left knee patellar tendon reconstruction  -discussed her exam is consistent with intra-articular pathology.  She has had the ankle scoped 2 times, but has had repeat injuries.  -diagnostic/therapeutic steroid injection, see procedure note.  Advised she call/MyChart with results of the injection, although we'd certainly see her in clinic for more in-depth/detailed post-injection assessment  -knee/foot pain previously occurring, but improved after previous ankle surgeyr, but developing recurrent symptoms.  Sports Med referral    Medium Joint Injection/Arthrocentesis    Date/Time: 9/15/2020 3:54 PM  Performed by: Andres Johnson DPM  Authorized by: Andres Johnson DPM     Indications:  Joint swelling, pain and diagnostic evaluation  Needle Size:  25 G  Guidance: surface landmarks    Approach:  Anteromedial  Medications:  40 mg triamcinolone 40 MG/ML; 0.5 mL lidocaine 1 %   After obtaining written consent, the joint was identified and the skin was prepped with alcohol and betadine.  The joint was distracted and the needle was advance to the underlying left ankle joint.  1 1/2cc mixture of 2:1 kenalog 40:1% lidocaine plain was injected.  The patient tolerated the procedure without complication.  Risks that were discussed included possible joint/cartilage damage, infection, pigment change, steroid flare.             Follow up:  Prn based on  injection results or sooner with acute issues      Body mass index is 29.58 kg/m .  Weight management plan: Patient was referred to their PCP to discuss a diet and exercise plan.         Andres Johnson DPM FACFAS FACFAOM  Podiatric Foot & Ankle Surgeon  [facility]  [phone]

## 2020-09-15 NOTE — LETTER
"    9/15/2020         RE: Samara Oropeza  [address]  [address]  [place] [facility] [place] [address]        Dear Colleague,    Thank you for referring your patient, Samara Oropeza, to the [facility]. Please see a copy of my visit note below.    Foot & Ankle Surgery   September 15, 2020    S:  Pt is seen today for evaluation of left ankle/lower leg pain.  Most recent left ankle surgery 9/25/19, including left ankle arthroscopy and open anterior talofibular ligament repair with Arthrex Internal Brace, as well as removal of non-viable inflammatory tissue over anterolateral ankle.  She hadn't been able to do PT as of 12/26/19 appointment, but states she subsequently did 10-12 sessions.  Ankle feels unsteady still.  Points to anterior ankle as area of pain.  Has had a few repeat injuries to the ankle since her last visit with us. She's also reporting some numbness/pain across the dorsal foot.  Previous left knee surgery, including patellar tendon repair, she was having episodes where \"a bone would pop out\" and she'd have pain radiating from the anterolateral knee down to the ankle/foot, with similar pain/paresthesias.  This went away after her first ankle surgery, but she's noticed a recurrence of these episodes.    Vitals:    09/15/20 1517   BP: 114/72   Weight: 75.8 kg (167 lb)   Height: 1.6 m (5' 3\")   '      ROS - Pos for CC.  Patient denies current nausea, vomiting, chills, fevers, belly pain, calf pain, chest pain or SOB.  Complete remainder of ROS it otherwise neg.      PE:  Gen:   No apparent distress  Eye:    Visual scanning without deficit  Ear:    Response to auditory stimuli wnl  Lung:    Non-labored breathing on RA noted  Abd:    NTND per patient report  Lymph:    Neg for pitting/non-pitting edema BLE  Vasc:    Pulses palpable, CFT minimally delayed  Neuro:    Light touch sensation intact to all sensory nerve distributions with paresthesias dorsal foot  Derm:    Neg for nodules, " lesions or ulcerations  MSK:    Left lower extremity - diffuse pain throughout left lower extremity from knee-to-ankle.  Tender anterolateral ankle, tender at fibular head.  Very tender at medial recess and anterior gutter.  Discomfort/guarding with anterior drawer, although no significant instability appreciated  Calf:    Neg for redness, swelling or tenderness    Assessment:  24 year old female with recurrent ankle pain/instability sp multiple reconstructive ankle surgeries, most recent ankle scope and open ATFL repair, as well as recurrent foot/knee pain sp previous left knee patellar tendon reconstruction      Plan:  Discussed etiologies, anatomy and options  1.  recurrent ankle pain/instability sp multiple reconstructive ankle surgeries, most recent ankle scope and open ATFL repair, as well as recurrent foot/knee pain sp previous left knee patellar tendon reconstruction  -discussed her exam is consistent with intra-articular pathology.  She has had the ankle scoped 2 times, but has had repeat injuries.  -diagnostic/therapeutic steroid injection, see procedure note.  Advised she call/MyChart with results of the injection, although we'd certainly see her in clinic for more in-depth/detailed post-injection assessment  -knee/foot pain previously occurring, but improved after previous ankle surgeyr, but developing recurrent symptoms.  Sports Med referral    Medium Joint Injection/Arthrocentesis    Date/Time: 9/15/2020 3:54 PM  Performed by: Andres Johnson DPM  Authorized by: Andres Johnson DPM     Indications:  Joint swelling, pain and diagnostic evaluation  Needle Size:  25 G  Guidance: surface landmarks    Approach:  Anteromedial  Medications:  40 mg triamcinolone 40 MG/ML; 0.5 mL lidocaine 1 %   After obtaining written consent, the joint was identified and the skin was prepped with alcohol and betadine.  The joint was distracted and the needle was advance to the underlying left ankle joint.  1 1/2cc  mixture of 2:1 kenalog 40:1% lidocaine plain was injected.  The patient tolerated the procedure without complication.  Risks that were discussed included possible joint/cartilage damage, infection, pigment change, steroid flare.             Follow up:  Prn based on injection results or sooner with acute issues      Body mass index is 29.58 kg/m .  Weight management plan: Patient was referred to their PCP to discuss a diet and exercise plan.         Andres Johnson DPM FACFAS FACFAOM  Podiatric Foot & Ankle Surgeon  [facility]  [phone]    Again, thank you for allowing me to participate in the care of your patient.        Sincerely,        Andres Johnson DPM, ROSIBEL

## 2020-09-16 ENCOUNTER — OFFICE VISIT - HEALTHEAST (OUTPATIENT)
Dept: BEHAVIORAL HEALTH | Facility: CLINIC | Age: 25
End: 2020-09-16

## 2020-09-16 DIAGNOSIS — F41.1 GENERALIZED ANXIETY DISORDER: ICD-10-CM

## 2020-09-16 DIAGNOSIS — F32.A DEPRESSION, UNSPECIFIED DEPRESSION TYPE: ICD-10-CM

## 2020-09-18 DIAGNOSIS — F41.1 GAD (GENERALIZED ANXIETY DISORDER): ICD-10-CM

## 2020-09-22 RX ORDER — GABAPENTIN 300 MG/1
CAPSULE ORAL
Qty: 90 CAPSULE | Refills: 1 | Status: SHIPPED | OUTPATIENT
Start: 2020-09-22 | End: 2021-02-11

## 2020-09-22 NOTE — TELEPHONE ENCOUNTER
Requested Prescriptions   Pending Prescriptions Disp Refills     gabapentin (NEURONTIN) 300 MG capsule [Pharmacy Med Name: GABAPENTIN 300MG CAPSULES] 90 capsule 1     Sig: TAKE 1 CAPSULE BY MOUTH THREE TIMES DAILY       There is no refill protocol information for this order        Routing refill request to provider for review/approval because:  Drug not on [place] [facility] [place]   [place]  reviewed 9/21/2020  Last refill: 8/19  Disp: 90  Concerns: none

## 2020-09-29 ENCOUNTER — ANCILLARY PROCEDURE (OUTPATIENT)
Dept: GENERAL RADIOLOGY | Facility: CLINIC | Age: 25
End: 2020-09-29
Attending: FAMILY MEDICINE
Payer: COMMERCIAL

## 2020-09-29 ENCOUNTER — OFFICE VISIT (OUTPATIENT)
Dept: ORTHOPEDICS | Facility: CLINIC | Age: 25
End: 2020-09-29
Attending: PODIATRIST
Payer: COMMERCIAL

## 2020-09-29 VITALS
BODY MASS INDEX: 29.59 KG/M2 | DIASTOLIC BLOOD PRESSURE: 60 MMHG | WEIGHT: 167 LBS | HEIGHT: 63 IN | SYSTOLIC BLOOD PRESSURE: 110 MMHG

## 2020-09-29 DIAGNOSIS — M25.562 CHRONIC PAIN OF LEFT KNEE: ICD-10-CM

## 2020-09-29 DIAGNOSIS — G89.29 CHRONIC PAIN OF LEFT KNEE: ICD-10-CM

## 2020-09-29 PROCEDURE — 99204 OFFICE O/P NEW MOD 45 MIN: CPT | Mod: 25 | Performed by: FAMILY MEDICINE

## 2020-09-29 PROCEDURE — 20611 DRAIN/INJ JOINT/BURSA W/US: CPT | Mod: LT | Performed by: FAMILY MEDICINE

## 2020-09-29 PROCEDURE — 73562 X-RAY EXAM OF KNEE 3: CPT | Performed by: FAMILY MEDICINE

## 2020-09-29 RX ORDER — METHYLPREDNISOLONE ACETATE 40 MG/ML
40 INJECTION, SUSPENSION INTRA-ARTICULAR; INTRALESIONAL; INTRAMUSCULAR; SOFT TISSUE
Status: DISCONTINUED | OUTPATIENT
Start: 2020-09-29 | End: 2023-02-12

## 2020-09-29 RX ORDER — DICLOFENAC SODIUM 75 MG/1
75 TABLET, DELAYED RELEASE ORAL 2 TIMES DAILY PRN
Qty: 60 TABLET | Refills: 1 | Status: ON HOLD | OUTPATIENT
Start: 2020-09-29 | End: 2023-02-12

## 2020-09-29 RX ADMIN — METHYLPREDNISOLONE ACETATE 40 MG: 40 INJECTION, SUSPENSION INTRA-ARTICULAR; INTRALESIONAL; INTRAMUSCULAR; SOFT TISSUE at 11:54

## 2020-09-29 ASSESSMENT — MIFFLIN-ST. JEOR: SCORE: 1466.64

## 2020-09-29 NOTE — LETTER
9/29/2020         RE: Samara Oropeza  [address]  [address]  [facility] [place] [address]        Dear Colleague,    Thank you for referring your patient, Samara Oropeza, to the [facility]. Please see a copy of my visit note below.    ASSESSMENT & PLAN  Patient Instructions     1. Chronic pain of left knee      -Patient has chronic left knee pain likely due to inflammation of her cartilage and meniscus  -Patient is losing her health insurance tomorrow and will not be covered until January of next year.  And so further work-up and treatment for her knee will need to be deferred until she has insurance again.  -Tolerated cortisone injection today without complications.  Patient given postprocedure instructions.  Hopefully the cortisone injections today will ease her pain until she has insurance again and she can follow-up for further work-up and treatment.  -Patient may continue to work out as her pain allows.  -Call direct clinic number [[phone]] at any time with questions or concerns.    Albert Yeo MD [facility]  [facility] [place]          -----    SUBJECTIVE  Samara Oropeza is a/an 25 year old female who is seen in consultation at the request of  Andres Johnson M.D. for evaluation of left knee pain. The patient is seen with her boyfriend.    Onset: 3 month(s) ago. Reports insidious onset without acute precipitating event. States had 4 surgeries with her left ankle and thinks that the knee pain is related to all of the issues with her ankle. Does state that she did have some falls last year on her knee after a few of her surgeries.   Location of Pain: left deep lateral knee pain  Rating of Pain at worst: 8/10  Rating of Pain Currently: 4/10  Worsened by: constant pain  Better with: heat, massage, extreme pressure  Treatments tried: ice, heat and massage  Associated symptoms: swelling, numbness, warmth,  weakness of leg, locking or catching and feeling of instability  Orthopedic history: YES - Date: patella reattachment, and mensicus clean out in 2013.   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    Past Medical History:   Diagnosis Date     Anemia      Anxiety      Arthritis      Behcet's disease (H)      Cervical adenitis May 2010     Chronic abdominal pain      Constipation, chronic 1994     Fibromyalgia      Gastro-oesophageal reflux disease      Gastroparesis      Irregular heart beat     tachycardia, has had workup     Migraines      Neuromuscular disorder (H)     fibramyalgia     Palpitations      PONV (postoperative nausea and vomiting)      Seizure (H)      Seizures (H)     unknown etiology     Syncope      Tourette's      Social History     Socioeconomic History     Marital status: Single     Spouse name: Not on file     Number of children: Not on file     Years of education: Not on file     Highest education level: Not on file   Occupational History     Not on file   Social Needs     Financial resource strain: Not on file     Food insecurity     Worry: Not on file     Inability: Not on file     Transportation needs     Medical: Not on file     Non-medical: Not on file   Tobacco Use     Smoking status: Never Smoker     Smokeless tobacco: Never Used   Substance and Sexual Activity     Alcohol use: Not Currently     Alcohol/week: 1.0 standard drinks     Types: 1 Standard drinks or equivalent per week     Comment: rarely     Drug use: Not Currently     Types: Marijuana     Comment: occassional marijuana only, denies others     Sexual activity: Not Currently     Partners: Male     Birth control/protection: Pill   Lifestyle     Physical activity     Days per week: Not on file     Minutes per session: Not on file     Stress: Not on file   Relationships      "Social connections     Talks on phone: Not on file     Gets together: Not on file     Attends Yazdanism service: Not on file     Active member of club or organization: Not on file     Attends meetings of clubs or organizations: Not on file     Relationship status: Not on file     Intimate partner violence     Fear of current or ex partner: Not on file     Emotionally abused: Not on file     Physically abused: Not on file     Forced sexual activity: Not on file   Other Topics Concern     Parent/sibling w/ CABG, MI or angioplasty before 65F 55M? Not Asked   Social History Narrative    Boyfriend of 5 years, living with \"in laws\" Oct 2017 and looking forward to their own apartment.          Patient's past medical, surgical, social, and family histories were reviewed today and no changes are noted.    REVIEW OF SYSTEMS:  10 point ROS is negative other than symptoms noted above in HPI, Past Medical History or as stated below  Constitutional: NEGATIVE for fever, chills, change in weight  Skin: NEGATIVE for worrisome rashes, moles or lesions  GI/: NEGATIVE for bowel or bladder changes  Neuro: NEGATIVE for weakness, dizziness or paresthesias    OBJECTIVE:  /60   Ht 1.6 m (5' 3\")   Wt 75.8 kg (167 lb)   BMI 29.58 kg/m     General: healthy, alert and in no distress  HEENT: no scleral icterus or conjunctival erythema  Skin: no suspicious lesions or rash. No jaundice.  CV: no pedal edema  Resp: normal respiratory effort without conversational dyspnea   Psych: normal mood and affect  Gait: normal steady gait with appropriate coordination and balance  Neuro: Normal light sensory exam of lower extremity  MSK:  LEFT KNEE  Inspection:    normal alignment  Palpation:    Tender about the lateral patellar facet, lateral joint line and medial joint line. Remainder of bony and ligamentous landmarks are nontender.    No effusion is present    Patellofemoral crepitus is Absent  Range of Motion:     00 extension to 1300 " flexion  Strength:    Quadriceps grossly intact    Extensor mechanism intact  Special Tests:    Positive: Patellar grind    Negative: patellar apprehension, MCL/valgus stress (0 & 30 deg), LCL/varus stress (0 & 30 deg), Lachman's, anterior drawer, posterior drawer, Brown's    Independent visualization of the below image:  Recent Results (from the past 24 hour(s))   XR Knee Standing AP Bilat [place] Bilat Lat Left    Narrative    No acute fracture, dislocation or osseous abnormalities.        Large Joint Injection/Arthocentesis: L knee joint    Date/Time: 9/29/2020 11:54 AM  Performed by: Yeo, Albert, MD  Authorized by: Yeo, Albert, MD     Indications:  Pain and osteoarthritis  Needle Size:  25 G  Guidance: ultrasound    Approach:  Superolateral  Location:  Knee      Medications:  40 mg methylPREDNISolone 40 MG/ML  Outcome:  Tolerated well, no immediate complications  Procedure discussed: discussed risks, benefits, and alternatives    Consent Given by:  Patient  Timeout: timeout called immediately prior to procedure    Prep: patient was prepped and draped in usual sterile fashion              Albert Yeo MD [facility]      Again, thank you for allowing me to participate in the care of your patient.        Sincerely,        Albert Yeo, MD

## 2020-09-29 NOTE — PROGRESS NOTES
ASSESSMENT & PLAN  Patient Instructions     1. Chronic pain of left knee      -Patient has chronic left knee pain likely due to inflammation of her cartilage and meniscus  -Patient is losing her health insurance tomorrow and will not be covered until January of next year.  And so further work-up and treatment for her knee will need to be deferred until she has insurance again.  -Tolerated cortisone injection today without complications.  Patient given postprocedure instructions.  Hopefully the cortisone injections today will ease her pain until she has insurance again and she can follow-up for further work-up and treatment.  -Patient may also take Diclofenac 75mg as needed for pain and inflammation.  -Patient may continue to work out as her pain allows.  -Call direct clinic number [[phone]] at any time with questions or concerns.    Albert Yeo MD [facility] Orthoped[facility] and Sports Medicine            -----    SUBJECTIVE  Samara Oropeza is a/an 25 year old female who is seen in consultation at the request of  Andres Johnson M.D. for evaluation of left knee pain. The patient is seen with her boyfriend.    Onset: 3 month(s) ago. Reports insidious onset without acute precipitating event. States had 4 surgeries with her left ankle and thinks that the knee pain is related to all of the issues with her ankle. Does state that she did have some falls last year on her knee after a few of her surgeries.   Location of Pain: left deep lateral knee pain  Rating of Pain at worst: 8/10  Rating of Pain Currently: 4/10  Worsened by: constant pain  Better with: heat, massage, extreme pressure  Treatments tried: ice, heat and massage  Associated symptoms: swelling, numbness, warmth, weakness of leg, locking or catching and feeling of instability  Orthopedic history: YES - Date: patella reattachment, and mensicus clean out in 2013.   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    Past Medical History:   Diagnosis Date     Anemia      Anxiety      Arthritis      Behcet's disease (H)      Cervical adenitis May 2010     Chronic abdominal pain      Constipation, chronic 1994     Fibromyalgia      Gastro-oesophageal reflux disease      Gastroparesis      Irregular heart beat     tachycardia, has had workup     Migraines      Neuromuscular disorder (H)     fibramyalgia     Palpitations      PONV (postoperative nausea and vomiting)      Seizure (H)      Seizures (H)     unknown etiology     Syncope      Tourette's      Social History     Socioeconomic History     Marital status: Single     Spouse name: Not on file     Number of children: Not on file     Years of education: Not on file     Highest education level: Not on file   Occupational History     Not on file   Social Needs     Financial resource strain: Not on file     Food insecurity     Worry: Not on file     Inability: Not on file     Transportation needs     Medical: Not on file     Non-medical: Not on file   Tobacco Use     Smoking status: Never Smoker     Smokeless tobacco: Never Used   Substance and Sexual Activity     Alcohol use: Not Currently     Alcohol/week: 1.0 standard drinks     Types: 1 Standard drinks or equivalent per week     Comment: rarely     Drug use: Not Currently     Types: Marijuana     Comment: occassional marijuana only, denies others     Sexual activity: Not Currently     Partners: Male     Birth control/protection: Pill   Lifestyle     Physical activity     Days per week: Not on file     Minutes per session: Not on file     Stress: Not on file   Relationships     Social connections     Talks on phone: Not on file     Gets together: Not on file     Attends Scientologist service: Not on file     Active member of club or organization: Not on file     Attends meetings of clubs or  "organizations: Not on file     Relationship status: Not on file     Intimate partner violence     Fear of current or ex partner: Not on file     Emotionally abused: Not on file     Physically abused: Not on file     Forced sexual activity: Not on file   Other Topics Concern     Parent/sibling w/ CABG, MI or angioplasty before 65F 55M? Not Asked   Social History Narrative    Boyfriend of 5 years, living with \"in laws\" Oct 2017 and looking forward to their own apartment.          Patient's past medical, surgical, social, and family histories were reviewed today and no changes are noted.    REVIEW OF SYSTEMS:  10 point ROS is negative other than symptoms noted above in HPI, Past Medical History or as stated below  Constitutional: NEGATIVE for fever, chills, change in weight  Skin: NEGATIVE for worrisome rashes, moles or lesions  GI/: NEGATIVE for bowel or bladder changes  Neuro: NEGATIVE for weakness, dizziness or paresthesias    OBJECTIVE:  /60   Ht 1.6 m (5' 3\")   Wt 75.8 kg (167 lb)   BMI 29.58 kg/m     General: healthy, alert and in no distress  HEENT: no scleral icterus or conjunctival erythema  Skin: no suspicious lesions or rash. No jaundice.  CV: no pedal edema  Resp: normal respiratory effort without conversational dyspnea   Psych: normal mood and affect  Gait: normal steady gait with appropriate coordination and balance  Neuro: Normal light sensory exam of lower extremity  MSK:  LEFT KNEE  Inspection:    normal alignment  Palpation:    Tender about the lateral patellar facet, lateral joint line and medial joint line. Remainder of bony and ligamentous landmarks are nontender.    No effusion is present    Patellofemoral crepitus is Absent  Range of Motion:     00 extension to 1300 flexion  Strength:    Quadriceps grossly intact    Extensor mechanism intact  Special Tests:    Positive: Patellar grind    Negative: patellar apprehension, MCL/valgus stress (0 & 30 deg), LCL/varus stress (0 & 30 deg), " Lachman's, anterior drawer, posterior drawer, Brown's    Independent visualization of the below image:  Recent Results (from the past 24 hour(s))   XR Knee Standing AP Bilat [place] Bilat Lat Left    Narrative    No acute fracture, dislocation or osseous abnormalities.        Large Joint Injection/Arthocentesis: L knee joint    Date/Time: 9/29/2020 11:54 AM  Performed by: Yeo, Albert, MD  Authorized by: Yeo, Albert, MD     Indications:  Pain and osteoarthritis  Needle Size:  25 G  Guidance: ultrasound    Approach:  Superolateral  Location:  Knee      Medications:  40 mg methylPREDNISolone 40 MG/ML  Outcome:  Tolerated well, no immediate complications  Procedure discussed: discussed risks, benefits, and alternatives    Consent Given by:  Patient  Timeout: timeout called immediately prior to procedure    Prep: patient was prepped and draped in usual sterile fashion              Albert Yeo MD [facility]

## 2020-09-29 NOTE — PATIENT INSTRUCTIONS
1. Chronic pain of left knee      -Patient has chronic left knee pain likely due to inflammation of her cartilage and meniscus  -Patient is losing her health insurance tomorrow and will not be covered until January of next year.  And so further work-up and treatment for her knee will need to be deferred until she has insurance again.  -Tolerated cortisone injection today without complications.  Patient given postprocedure instructions.  Hopefully the cortisone injections today will ease her pain until she has insurance again and she can follow-up for further work-up and treatment.  -Patient may also take Diclofenac 75mg as needed for pain and inflammation.  -Patient may continue to work out as her pain allows.  -Call direct clinic number [[phone]] at any time with questions or concerns.    Albert Yeo MD [facility]  [facility] [place]

## 2020-09-30 ENCOUNTER — OFFICE VISIT - HEALTHEAST (OUTPATIENT)
Dept: BEHAVIORAL HEALTH | Facility: CLINIC | Age: 25
End: 2020-09-30

## 2020-09-30 ENCOUNTER — VIRTUAL VISIT (OUTPATIENT)
Dept: RHEUMATOLOGY | Facility: CLINIC | Age: 25
End: 2020-09-30
Attending: NURSE PRACTITIONER
Payer: COMMERCIAL

## 2020-09-30 DIAGNOSIS — M35.2 BEHCET'S DISEASE (H): ICD-10-CM

## 2020-09-30 DIAGNOSIS — F41.1 GENERALIZED ANXIETY DISORDER: ICD-10-CM

## 2020-09-30 DIAGNOSIS — M06.09 RHEUMATOID ARTHRITIS OF MULTIPLE SITES WITHOUT RHEUMATOID FACTOR (H): ICD-10-CM

## 2020-09-30 DIAGNOSIS — M35.2 BEHCET'S DISEASE (H): Primary | ICD-10-CM

## 2020-09-30 LAB — ERYTHROCYTE [SEDIMENTATION RATE] IN BLOOD BY WESTERGREN METHOD: 7 MM/H (ref 0–20)

## 2020-09-30 PROCEDURE — 86146 BETA-2 GLYCOPROTEIN ANTIBODY: CPT | Mod: 59 | Performed by: INTERNAL MEDICINE

## 2020-09-30 PROCEDURE — 86235 NUCLEAR ANTIGEN ANTIBODY: CPT | Mod: 59 | Performed by: INTERNAL MEDICINE

## 2020-09-30 PROCEDURE — 82728 ASSAY OF FERRITIN: CPT | Performed by: INTERNAL MEDICINE

## 2020-09-30 PROCEDURE — 86140 C-REACTIVE PROTEIN: CPT | Performed by: INTERNAL MEDICINE

## 2020-09-30 PROCEDURE — 86704 HEP B CORE ANTIBODY TOTAL: CPT | Performed by: INTERNAL MEDICINE

## 2020-09-30 PROCEDURE — 86039 ANTINUCLEAR ANTIBODIES (ANA): CPT | Performed by: INTERNAL MEDICINE

## 2020-09-30 PROCEDURE — 86803 HEPATITIS C AB TEST: CPT | Performed by: INTERNAL MEDICINE

## 2020-09-30 PROCEDURE — 83876 ASSAY MYELOPEROXIDASE: CPT | Performed by: INTERNAL MEDICINE

## 2020-09-30 PROCEDURE — 86160 COMPLEMENT ANTIGEN: CPT | Mod: 59 | Performed by: INTERNAL MEDICINE

## 2020-09-30 PROCEDURE — 85730 THROMBOPLASTIN TIME PARTIAL: CPT | Performed by: INTERNAL MEDICINE

## 2020-09-30 PROCEDURE — 86481 TB AG RESPONSE T-CELL SUSP: CPT | Performed by: INTERNAL MEDICINE

## 2020-09-30 PROCEDURE — 83516 IMMUNOASSAY NONANTIBODY: CPT | Performed by: INTERNAL MEDICINE

## 2020-09-30 PROCEDURE — 85652 RBC SED RATE AUTOMATED: CPT | Performed by: INTERNAL MEDICINE

## 2020-09-30 PROCEDURE — 86225 DNA ANTIBODY NATIVE: CPT | Performed by: INTERNAL MEDICINE

## 2020-09-30 PROCEDURE — 86255 FLUORESCENT ANTIBODY SCREEN: CPT | Performed by: INTERNAL MEDICINE

## 2020-09-30 PROCEDURE — 85613 RUSSELL VIPER VENOM DILUTED: CPT | Performed by: INTERNAL MEDICINE

## 2020-09-30 PROCEDURE — 36415 COLL VENOUS BLD VENIPUNCTURE: CPT | Performed by: INTERNAL MEDICINE

## 2020-09-30 PROCEDURE — 82306 VITAMIN D 25 HYDROXY: CPT | Performed by: INTERNAL MEDICINE

## 2020-09-30 PROCEDURE — 86147 CARDIOLIPIN ANTIBODY EA IG: CPT | Performed by: INTERNAL MEDICINE

## 2020-09-30 PROCEDURE — 86038 ANTINUCLEAR ANTIBODIES: CPT | Performed by: INTERNAL MEDICINE

## 2020-09-30 PROCEDURE — 86431 RHEUMATOID FACTOR QUANT: CPT | Performed by: INTERNAL MEDICINE

## 2020-09-30 PROCEDURE — 87340 HEPATITIS B SURFACE AG IA: CPT | Performed by: INTERNAL MEDICINE

## 2020-09-30 ASSESSMENT — PAIN SCALES - GENERAL: PAINLEVEL: MODERATE PAIN (5)

## 2020-09-30 NOTE — PROGRESS NOTES
"Samara Oropeza is a 25 year old female who is being evaluated via a billable video visit.      The patient has been notified of following:     \"This video visit will be conducted via a call between you and your physician/provider. We have found that certain health care needs can be provided without the need for an in-person physical exam.  This service lets us provide the care you need with a video conversation.  If a prescription is necessary we can send it directly to your pharmacy.  If lab work is needed we can place an order for that and you can then stop by our lab to have the test done at a later time.    Video visits are billed at different rates depending on your insurance coverage.  Please reach out to your insurance provider with any questions.    If during the course of the call the physician/provider feels a video visit is not appropriate, you will not be charged for this service.\"    Patient has given verbal consent for Video visit? Yes  How would you like to obtain your AVS? MyChart  If you are dropped from the video visit, the video invite should be resent to: Send to e-mail at: [email or web address]  Will anyone else be joining your video visit? No        Video-Visit Details    Type of service:  Video Visit    Video Start Time: 8:16 am  Video End Time: 8:58 am    Originating Location (pt. Location): Home    Distant Location (provider location):  [facility] RHEUMATOLOGY     Platform used for Video Visit: Magdy Sosa MD        "

## 2020-09-30 NOTE — PROGRESS NOTES
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 Lilliana Miramontes- complicated migraine  # On Sprintec for birth control   # Borderline transaminase elevation    12/18 Basic blood cell counts, liver and kidney function labs within normal limits    Meets ISG 1990 criteria for Behcets. Initially, very good response to colchicine which has reduced the intensity and frequency of the oral ulcers in the past. Patient relapsed with genital ulcers and few oral ulcers in the end of 2016 and also with folliculitis in skin. Seen Derm Dr. Elliott. He recommended otezla. Otezla is working for her and she takes twice daily 30mg PO daily. Chest pain , mouth sores, hand pain, morning pain are all better when she tried otezla 30mg twice daily. Currently off of otezla since about 8/18 because of frequent UTI per patient. Colchicine dose was reduced recently in hospital to 0.3mg daily per patient. Patient reports this was regarding antibiotic interaction. As patient's behcet's is flaring up I recommend increasing colchicine back to 0.6mg BID. No interaction with levaquin. If genital lesions do not improve, then see gynecology.     Neurology investigated for seizures and cause. Hospitalized 12/17. 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     Has tried prednisone in the past, but does not tolerate well due to mood issues, and has been off prednisone for the past 6+ months. Has H/o Tourettes. Followed by Dr. Miramontes.     She continues to have GI symptoms  Colonoscopy and endoscopy negative 2018.  Has gastroparesis.  Behcets may have GI involvement but no evidence of GI inflammation at this time. Dr. Baker has evaluated her. Dr. Rollins did the endoscopies. Her abdominal pain is not related to otezla/colchicine as her abdominal symptoms were present even before they were started. This was verified with the patient.  Patient seeing [facility] currently. [facility] suggested 6 week therapy in [place] for pelvic floor muscles. Patient is not able to afford that and thinking of alternative options.     She gets visual symptoms  But there is no evidence of uveitis including posterior uveitis or retinal vasculitis, denies symptoms at today's visit. She has seen Dr. Garcia in the past and also seen Dr. Heaton around 2/16 and 9/17. Patient still getting double vision and floaters but 9/17 eye exam was stable.     She also likely has fibromyalgia. Currently managing her behcets.     For chest symptoms, she was referred to pulmonology by GI. CT chest negative for any lung issues 1/18    - Continue oral gel/Triamcinolone acetonide cream (0.1 percent in [employer]) three to four times daily during attacks of oral ulcers and be used until pain from the ulcer ceases. Potent topical corticosteroids may be used for genital ulcers. Also use magic mouthwash as needed.    Plan:    Labs today    Add otezla    When you start otezla, drop colchicine to one tab of 0.6 mg a day as needed    Try over the counter aleve, voltaren gel, biotene products    Please talk to your  about referral to women's health for contraception     GI, derm referral after getting insurance back    Return visit (virtual) in 3 months      Orders Placed This Encounter   Procedures      Hepatitis B surface antigen     Hepatitis C antibody     Myeloperoxidase and Proteinase 3 Panel     Complement C3     Complement C4     CRP inflammation     DNA double stranded antibodies     KLAUS Panel (RNP, SMITH, Scleroderma, SSAB, SSBB); KLAUS, Antibodies, Panel     Erythrocyte sedimentation rate auto     Vitamin D Deficiency     Rheumatoid factor     Hepatitis B core antibody     Beta 2 Glycoprotein 1 Antibody IgG     Beta 2 Glycoprotein 1 Antibody IgA     Beta 2 Glycoprotein 1 Antibody IgM     Anti Nuclear Marlee IgG by IFA with Reflex     Ferritin     CARDIOLIPIN AB (IGG, IGM)     Cardiolipin Antibody IgA     Lupus Anticoagulant Panel     ANCA IgG by IFA with Reflex to Titer               Active Problem List:     Patient Active Problem List    Diagnosis Date Noted     Infection of joint of ankle (H) 05/06/2019     Priority: Medium     Cellulitis 03/09/2019     Priority: Medium     Displacement of lumbar intervertebral disc without myelopathy 11/13/2018     Priority: Medium     Overview:   Created by Conversion       Iron deficiency associated with nonfamilial restless legs syndrome 11/13/2018     Priority: Medium     Enthesopathy of hip region 11/13/2018     Priority: Medium     Overview:   Created by Conversion       Tourette's syndrome 11/13/2018     Priority: Medium     Overview:   Created by Conversion       Somatic symptom disorder 06/01/2018     Priority: Medium     Pelvic floor weakness 04/25/2018     Priority: Medium     Spells of decreased attentiveness 12/19/2017     Priority: Medium     Mobile cecum 11/09/2017     Priority: Medium     Cecum noted in Right lower quadrant on 4/17 CT scan, and in Left upper Quadrant on CT on 11/2017.       Vitamin D deficiency 10/11/2017     Priority: Medium     How low, unknown/not found. On D when tested at 27. Starting cholecalciferol October 2017. Needs recheck.       Convulsions, unspecified convulsion type (H) 10/03/2017     Priority: Medium     Transient alteration  of awareness 10/03/2017     Priority: Medium     Chronic pain syndrome 07/27/2017     Priority: Medium     Major depressive disorder, recurrent episode, moderate (H) 06/27/2017     Priority: Medium     Cervical pain 05/02/2017     Priority: Medium     Acute left ankle pain 03/31/2017     Priority: Medium     Cervical dystonia 03/28/2017     Priority: Medium     PTSD (post-traumatic stress disorder) 01/17/2017     Priority: Medium     Patellofemoral instability 10/20/2016     Priority: Medium     Fibromyalgia 08/04/2016     Priority: Medium     Rheumatoid arthritis of multiple sites without rheumatoid factor (H) 08/04/2016     Priority: Medium     Raynaud's disease without gangrene 08/04/2016     Priority: Medium     Chronic abdominal pain 08/04/2016     Priority: Medium     Palpitations 01/12/2016     Priority: Medium     On colchicine therapy 10/30/2015     Priority: Medium     Spell of shaking 05/06/2015     Priority: Medium     Migraine 02/04/2015     Priority: Medium     Behcet's disease (H) 12/10/2014     Priority: Medium     Headaches due to old head injury 11/12/2013     Priority: Medium     Milk protein intolerance 10/11/2013     Priority: Medium     Intestinal malabsorption 10/11/2013     Priority: Medium     Concussion 02/13/2013     Priority: Medium     Jan 2013, with prolonged recovery- followed by sports med         Knee pain 01/03/2013     Priority: Medium     Generalized anxiety disorder 06/25/2009     Priority: Medium     Tics - Tourette syndrome 05/18/2009     Priority: Medium     Followed by psychotherapy. Symptoms well managed. [facility] (Dr. Simpson)           IBS (irritable bowel syndrome) 05/18/2009     Priority: Medium     Allergic rhinitis 05/18/2009     Priority: Medium     GERD (gastroesophageal reflux disease) 01/10/2008     Priority: Medium     Gastroparesis 1995     Priority: Medium          History of Present Illness:     Chief Complaint   Patient  presents with     Video Visit     consult      Seen Dr. Marilyn Moran [facility] in [facility] [place]:    Original presentation 2014:    Ms Oropeza is a 19 y.o. female who presents with one year of presentation of painful oral ulcers (monthly) once that have worsened with two episodes in the last two months that presented with painful vulvar or vaginal ulcers (2 episodes so far every month) [not sure if they leave scar] as well that timing coincides with menses ([facility] [place]: 8/25/14).   ORAL ULCERS: last two episodes were severe, painful, white base with erythematous halo, that appear and disappear in the matter of 1-2 weeks without, does not bleed and doesn't respond to any treatment used (magic mouthwash), 10-15 in number with the biggest one being dime size.     VAGINAL ULCERS: 2-3 in number between labia majora and minora that occur simultaneously with oral ulcers, painful, non bleeding ulcers that are erythematous, no pus.     FOLLICULITIS: patient reports having spots in legs specially around ankle and knee, but arms, and neck are affected as well. Small lesions that resemble ingrown hair, most are red erythematous elevated lesions, well demarcated, some with liquid that patient refers as pimple like.     SKIN RASHES: welts and red macules with well defined borders that are pruritic and non-ulcerative on chest, arms and back. Benadryl relieves.     ARTHRALGIAS: In descending order of severity: hips, knees, wrists, shoulders, neck, lumbar, fingers.   C/o morning stiffness in hips, back and neck lasting for about until noon.     Ms. Oropeza reports they present in severe flares and never fully resolve, but do get better. She has seen some swelling and warmth on the affected joints but has not noticed and redness. Has tried Tylenol, ibuprofen, and hydrocodone with not much benefit.     FEVERS: Reports 3-4 sporadic episodes per month of self limited fevers of 38 C that occur during the evenings and associate facial flushing.      HEADACHES: occur daily and are bitemporal and irradiate occipitally, pulsatile in nature, intensity varies from intense to mild, are worsened with movement. On treatment with ibuprofen and somatriptan without any positive results.     VISION CHANGES: Patient reports that suddenly she would only see a gray blotch in her right eyes and would persist like this for a day and a half. Also reports blurriness in her left eye. Presence of pain and burning sensation of eyeball with associated redness. Has changed prescription glasses in the matter of 2 years 3 times. She has had opthalmology exam and was not suggestive of any evidence of uveitis.   She was also evaluated in ED for a dizzy spell.     ABD PAIN W/ INTERMITTENT DIARRHEA AND CONSTIPATION: Patient reports abdominal pain that is intermittent, periods of 7 days without bowel movement and feeling bloated with change of pattern to diarrhea (liquidy without blood nor mucus, non foul smelling). Has taken Bentyl, Zegrid, and rinetidine with mild clinical improvement.  She also reports small red nodules after each needle stick for blood draw.   Neurology saw her back then and was not impressed with her presentation as physical examination was normal. Also MRI brain normal: Findings: No definite hemorrhage, mass affect, midline shift, or ventriculomegaly is noted.There are a few scattered non specific white matter T2 hyperintensities. Axial diffusion weighted images are unremarkable.     The major vascular structures appear patent. There is opacification and severe mucosal thickening in the right maxillary sinus. The remaining paranasal sinuses, and mastoid air cells are clear. Orbits are unremarkable  She has + HSV-1 IGG. Seen ID. Negative for Herpes simplex virus culture. HSV IGM I/II COMBINATION SENT TO [facility]  RESULTS = <0.91  REF RANGE: <0.91 = NEGATIVE  ID did not think HSV could explain her mouth and genital sores.     CRP within normal limits. HIV negative.  CBC within normal limits; UA negative. Creatinine within normal limits   CYNDIE neg.  Antigliadin neg.   ANti TTG neg.   [facility] 2014: Colonoscopy and endoscopy neg; result not available. Not sure if biopsies were done.   Raynaud's phenomenon:  Onset: about 2013  Digits: all fingers  Digital ulcers: no  Color changes: white ---> purple and red  Duration of an attack: About couple of hours per mom  Pain during attack: not clear pain but bruise like feeling  Frequency of attacks: few times a month  Family history of Raynauds: no  GERD: very long time    No hemoptysis.   No miscarriages/ no thrombosis in past.   No family or personal history of psoriasis, ulcerative colitis or chron's disease.   No buttock pain or low back pain or stiffness in AM    Interim history:  Dec 2014- Multiple mouth ulcers and did not eat for 5 days. This coincided with periods. Colchicine 0.6mg PO BID started in Nov 2014.   Prednisone taper in Dec 20mg to 0 in 4 weeks. She also used magic mouthwash. Kenalog cream. After going to the ER, 3 days later her ulcers were better. She thinks it improved after the menstruation stopped.   LMP 3 weeks ago.   COLCHICINE HAS HELPED A LOT REDUCING THE INTENSITY OF MENSTRUATION ASSOCIATED ORAL AND VULVAR ULCERS.     Interim history: 6/15    Since last visit only two episodes of mouth sores- small sized 6   No genital ulcers since last visit.   Colchicine 1.2 mg in AM and 0.6mg in PM keeps her symptoms under control.     Admitted in 5/15:  Admission Diagnoses:  - LUE weakness  - spell  - hx of migraines  - hx of Tourette syndrome  - hx of Behcet's disease    Discharge Diagnoses:   - spell likely 2/2 anxiety  - hx of migraines  - hx of Tourette syndrome  - hx of Behcet's disease    CT and MRI brain was normal.     Seeing Dr. Lilliana Miramontes soon as outpatient.     Dr. Garcia did not find any intraocular inflammation.      Interm 10/30/2015  ED for migraine. Continues to see neuro - MRI brain without lesions  "noted. Saw GI - upper barium normal. May be considering repeat colo. Worsening lesions in mouth and genitals. Has had continuous lesion in mouth x 2 months. 2 genital ulcers. Had fracture of right sesamoid in foot. Continues to have low grade fevers. New folliculitis on chest, legs and arms.     Interim hx: 1/11/2016    Pt states that since last visit she continues to have oral ulcers and has noted more folliculitis-like lesions on her lower extremities.  She states that on her right lower leg she had a red macular spot that has since resolved.  She denies uveitis.  She states that the colchicine initially helped but then stopped working.  She takes 0.6 mg TID.  She stopped taking her prednisone last week as she feels like this hasn't helped.  She hasn't had any episodes of vaginal ulcers.      She saw GI who stated she has gastroparesis.  She is seeing Cardiology later this week for possible syncopal episodes.      Interim history 5/16  Mouth ulcers X 1 last month  Genital ulcers - none since last visit.     Bilateral MCPs pain, knee pain and low back pain +ve increased since last visit  Morning stiffness X 2 hours (increasing)   5-6/10    \"overall myalgia\" has gotten worse    C/o pseudofolliculitis lesion on anterior shins bilaterally worse    Interim history: (7/18/2016)  Patient has just started Humira for 2 doses on 7/1 and 7/15 and reported minimal improvement of her hip pain but otherwise, did not notice anything different.     She reported painful mouth ulcer once a month since last visit but noticed that it has been getting deeper.   Denied any genital ulcers.    Still has ongoing bilateral MCPs pain, knee pain but this has been intermittent and she did not have any much pain today. She reported 2-3 hours morning stiffness of both hands and pain score of 6/10 at her knees and 8/10 of her hands but currently takes no pain medication except prn ativan which she takes when her muscle is really tight.     The " only concern is that she gained weight even though she has not been eating much (eat once a day) and do exercises every day for 1 hour (with video of yoga, pilates). Her mother wonders if she needs to have some labs work up include sex hormone, thyroid, cortisol.    Interval history 9/14/16  Patient was started Humira at the last visit, patient reports worsening of skin ulcers since starting Humira and stopped taking Humura for the past 2 weeks. Patient reports oral and genital ulcers has been stable even before starting Humira and has not had any interval oral/genital ulcers. However she reports recurrent skin ulcers occurring on a daily basis that affects her chest and anterior legs. Patient also has stopped taking prednisone, she reports that she doesn't feel the prednisone helps, her last dose of prednisone was 6+ months ago. Patient also report worsening low back pain that sometimes causes her to limp.    In the interval patient also visited ED on 8/31/16 for left hand pain and what the patient describes as phlebitis, no medication or procedure was done and the patient was discharged with a splint. Patient also reported 1 episode of chest pressure that was associated with dyspnea and numbness of the left arm, she was shopping in a [employer] at the time of onset, and the pressure resolved after she took a nap.    Patient denies any infectious symptoms in the interval, no fever, chills, night sweat, cough, dysuria, denies eye pain or visual changes.    November 4, 2016  Oct - had one genital ulcer  Oral ulcers X 5- around menstruation time.   Knee pain- chronic on the left side  Dizziness spells - on and off; been to the ER for that in the past.   On and off migraines  July 2013 - s/p MPFL reconstruction plus LRL 7/2013  Morning stiffness in knee (left) X 1 hour    Severe jaw pain and chest pain- patient wondering if this is Tourette's or esophageal spasms. Cardiac workup negative.   Fever     January 13,  2017  Yesterday in ER [facility] [place] with orthostatic syncope from dehydration.   Chest pain on and off still continues. Painful with deep breaths.   Oral ulcers - few minor ones lasting for one week;   One major one in roof of mouth lasting for about one week.   Knee pain +ve - reviewed the recent MRI with her orthopedic surgeon.   On and off migraines  otezla is approved. Still waiting to receive the meds.     March 31, 2017  Otezla current dose 15mg PO BID.   She is tolerating okay at this dose and is willing to increase the dose further up to 30mg BID.   Her joint pains are better on otezla. Knee pain is also better.   Back and neck pain +ve 8/10 when it is worse. Intramuscular botox injections were given on Monday in PMR.   2 minor genital ulcers in the interim- resolved.   Few oral ulcers in the interim- resolved.   Nasal ulcer - resolved.   Migraines on and off.   Nausea with otezla but improving.   Dizziness and blackouts on and off. She fell once and hurt her ankle. Wearing boot in left leg.   Complete ROS negative except for above    May 17, 2017  Tolerating otezla better. Not throwing up anymore. Current dose 20mg in AM and 30mg in PM (2nd week).   Patient thinks that her oral ulcers frequency and severity are improving with otezla. Also no genital ulcers in the interim.   Currently on doxycycline for bronchitis/?pneunomia. 4 more days left.     Joint pain history  2 weeks ago/ dx with pneumonia 1 week ago/  Involved joints: all over  Pain scale: 6.5/10   Wakes the patient from sleep : Yes  Morning stiffness: Yes for 120 minutes  Meds used:otezla,colchicine     Interim history  Since last visit:  1. Infections - Yes/ pneumonia  2. New symptoms/medical problem - Yes/ thinks she may have had a mini-seizure about 10 days ago ; did not seek medical attention; did not reoccur after that. Patient seeing PCP today.  3. Any side effects from Rheum medications -vomiting a lot (resolved)  3. ER  visits/Hospitalizations/surgeries - Yes  4. Last PCP visit: has an apt. today    Patient still getting double vision. Floaters present.     Therapist recommended psychiatry evaluation. Patient does not want to see psychaitry. Patient will see PCP today.     August 22, 2017  Have you ever seen a rheumatologist Yes Who You When 5/17/17    NO genital ulcers in the interim.   Mouth ulcers- three in the back of the throat and roof of the mouth last month.     Joint pain history  Onset: doing alittle worse / cut her otezla back to 30mg  Daily due to GI problems  Involved joints: all over her body. Been having more black out episodes, been having more chest pains.also has raised bumps on both legs from the knees down that itch and are painful   Pain scale:  5.5/10     Wakes the patient from sleep : Yes  Morning stiffness:Yes for 120 minutes  Meds used:otezla, colchicine (three pills daily)     Interim history  Since last visit:  1. Infections - Yes stomach issue  2. New symptoms/medical problem - No  3. Any side effects from Rheum medications -nausea from otezla  3. ER visits/Hospitalizations/surgeries - Yes, ER for foot, ankle injury from passing out and fell down the steps. Hit her head another time from passing out. Alcohol poisoning in July  4. Last PCP visit: 6/23/17 September 19, 2017  Have you ever seen a rheumatologist Yes Who You When 8/22/17  Joint pain history  Onset: pt states that she hurts everywhere for 6 days  Involved joints: all joints  Pain scale:  7/10     Wakes the patient from sleep : Yes/ not sleeping at all  Morning stiffness:Yes for 180 minutes  Meds used:colchicine, otezla, magic mouth wash, lidocaine gel  Last one week: increased joint pain; and genital ulcer  Genital lesion (dimed size) in the last one week  After botox a week ago, she had fever 101 for three days; near syncopes. Back pain; floaters  Chest pain , mouth sores, hand pain, morning pain were all better with otezla 30mg twice  daily.    Interim history  Since last visit:  1. Infections - No  2. New symptoms/medical problem - No  3. Any side effects from Rheum medications -nausea from the otezla  3. ER visits/Hospitalizations/surgeries - No  4. Last PCP visit: may 2017     October 18, 2017     Have you ever seen a rheumatologist Yes Who You When 9/19/17  Joint pain history  NO mouth or genital sores in the last 4 weeks.   Medrol dosepak helped with visual symptoms but not joint pains.     Onset: pt states that she is doing better than last time with her behcet's. Today has severe stomach pains, states that she is constipated. Pt had a seizure 2 days ago. Does have a metallic taste in her mouth  Involved joints: hands , neck, shoulders  Pain scale:  9/10 from stomach pain;      Wakes the patient from sleep : Yes  Morning stiffness:Yes for 120 minutes  Meds used:cochicine , otezla 30mg twice daily     Interim history  Since last visit:  1. Infections - No  2. New symptoms/medical problem - Yes/ the cartilage in her chest is inflamed  3. Any side effects from Rheum medications -nausea from the otezla  3. ER visits/Hospitalizations/surgeries - No  4. Last PCP visit: yesterday    January 16, 2018  Have you ever seen a rheumatologist Yes Who You When 10/18/17  Joint pain history  Onset: pt states that she was in the hospital for 5 days before christmas, they told her she had lesions in her brain in the white matter. Had blood work drawn in the ER and they told her her inflammatory markers were elevated. Was seen in the ER last week for vomiting and diarrhea, not eating. Saw Gastro. On 1/10/18. 1.5 weeks ago she had an endoscopy and colonoscopy. She has been having elbow  And hands and knee pain, loosing use of her hands. Been dropping things. Also states that she has been getting lesions up her nose  Involved joints: see above  Pain scale:  8/10     Wakes the patient from sleep : Yes  Morning stiffness:Yes for 120 minutes  Meds used:colchisine,  otezla, magic mouth wash, kenolog cream, lidocaine gel     Interim history  Since last visit:  1. Infections - Yes/ had an infection in her jaw. Having sinus issues  2. New symptoms/medical problem - Yes/ states that she is having problems walking, states that she was bouncing when she was walking  3. Any side effects from Rheum medications -otezla, nausea  3. ER visits/Hospitalizations/surgeries - Yes/ see chart  4. Last PCP visit: November 2017 April 17, 2018  Have you ever seen a rheumatologist Yes Who You When 1/16/18  Joint pain history  Onset: pt states that she is not doing well. She is having increased stomach issues, only eats 2 times in 4 days unable to keep the otezla down due to vomiting . Has sleep study done in 1 week; no genital ulcers since last appointment. 6 small mouth sores since last appointment.   Involved joints: see above   Pain scale:  7/10     Wakes the patient from sleep : Yes  Morning stiffness:Yes for 60 minutes  Meds used:otezla , colchicine, diclofenac gel, magic mouthwash, lidocaine gel      Interim history  Since last visit:  1. Infections - Yes/ had a sinus infection, thinks she is getting sick now  2. New symptoms/medical problem - Yes/ neurology sent pt to cardiology. Has a heart condition but not sure what . She is seeing a ortho. Due to knee pain   3. Any side effects from Rheum medications -nausea/vomiting from the otezla   3. ER visits/Hospitalizations/surgeries - Yes/ seen in ER   4. Last PCP visit: yesterday    July 17, 2018  Have you ever seen a rheumatologist Yes Who You When 4/17/18  Joint pain history  Onset: pt Is here for a follow-up states that she started to get lesions on her legs , face and arm. Hurts all over, lower back is very painful. Right hand and wrist area are numb  Involved joints: see above  Pain scale:  7/10   worse in the morning  Wakes the patient from sleep : Yes/ does not go to sleep till late  Morning stiffness:Yes for 4-5 hours minutes  Meds  used:colchicine, otezla has  Not started otezla yet due to extreme nausea      Interim history  Since last visit:  1. Infections - Yes/ had a bacterial infection in her pelvic area was hospitalized  2. New symptoms/medical problem - Yes/ hands are swelling up. Having orthopedic issues. Was having problem with her veins sticking up, and very painful. Has happened multiply times   3. Any side effects from Rheum medications -see above  3. ER visits/Hospitalizations/surgeries - Yes/ hospital and ER for bacterial infection  4. 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 ER said may have blood clot in calf, but when did MRI, stated body may have broken it up on it's own. Elevated D-dimer  Involved joints: Knees, neck, hands  Pain scale:  6.5/10     Wakes the patient from sleep : Yes  Morning stiffness:Yes for 180 minutes  Meds used:hyoscyamine, not taking otezla. Last dose Sep. Patient did not want to take otezla with the antibiotics.     Last major mouth ulcer- aug or Sep  No genital ulcers in the last 6 months.      Interim history  Since last visit:  1. Infections - Yes, UTI's twice a month since last visit  2. New symptoms/medical problem - No  3. Any side effects from Rheum medications -otzela causes nausea  3. ER visits/Hospitalizations/surgeries - Yes, 1/2/19 repaired some ligaments and mass taken out, also joint build up removed from a previous injury  4. Last PCP visit: 12/5/19 June 12, 2019  Have you ever seen a rheumatologist yes Who you When 1/9/19  Joint pain history  Onset: Patient is here for a follow up. A lot of infections and in and out of the hospital, who wanted her to follow up with Rheumatology again.  Involved joints: knees, legs, back, neck, shoulders, ankles  Pain scale:  6.5/10     Wakes the patient from sleep : Yes  Morning  stiffness:Yes for 120 minutes  Meds used:magic mouthwash, colchicine     Interim history  Since last visit:  1. Infections - Yes, staph  2. New symptoms/medical problem - kidney failure  3. Any side effects from Rheum medications -none  3. ER visits/Hospitalizations/surgeries - Yes, 3 hospitalizations, and surgeries,  4. Last PCP visit: 6/11/19      Today 9/30/2020: New to me, establishing care. Flaring off otezla.    816   Reports worsening oral ulcers and joint pain and skin rash.    No vaginal ulcers currently. Last ones were 5 months ago.    Gets oral ulcers monthly, not today, had them last few days ago. They come up as flare up around period time. They self-resolve.    Thinks colcrys is helping but not enough, lost some benefit. No longer has diarrhea from it. the dose is 0.6 mg bid.    Came off otezla last year when she had severe staff infection, there was drug drug interaction with vancomycin. Wants to go back on it.    Skin lesions over chest are clearing up. Has skin lesions over her legs.    She is off of otezla >1 yr. She had daily vomiting and abdominal cramps initially which later resolved after 2 months.     Today, is her last day liz her health insurnaace  .    Reports pain and swelling liz hands. Drops things, lost strenghth. Veins look inflamed. Hands are swollen in AM and before bedtime. AM stiffness is 2 hours. can t tolerate ibuprofen.    Prednnisonne does not help much.    Wants to try eleve.    Had 2 blood clots over L arm, finished xraalto 4 months ago.     Was told to have severe dry eyes, hs not had eyes checked> 1 yr as was in the hospital with staff infection. systanee nd gentle eyedrops help some, sometimes gets sharp pain and and blurry vision in her eyes from dryness. No h/o uveitis.    has dry mouth, drinks water.    Gets T  F sometimes. It is sporadic.    Lost hair.    Gets intermittent CP. Had several hospital visits and admissions in the [facility] for it. lorazepam helped witch  chest spasms, she does not like pain meds.    She was prescribed 0.5 mgq d prn, 10 tbs/monthss.    She gets dry cough, just started using albuterol inhaler, it helps.    No SOB.    Has gastroparesis, IBS  nd h/o severas admission for constiption, colon blockage with impaction and gets bloating. has lost follow up with GI.    She is working with  to get medical assistance to get insurance back by early next year. She filed it again.    Gets botox inj every 3 months nd they help, has to cancel 10/20 botox inj s will not have insurance. they came back yesterday.    last seizure waas last year. still gets black out spells, salts ne was last week.      derm eye gi      FHx: Both parents have RA.    SHx: She was working in a [employer] shop, it was closed because of pandemic. sexually active, not on oral contraceptives. She thinks she had a misccraaaiaage last wk, had n period x2 months then mueller bleeding a lot, dark red and was different from period. Felt something came out that she feels she miscarried, no pos pregnancy test. No smoking. rarely drinks ETOH.    [facility]     thumbs between     MCP tender    otezla helped with skin lesions, oral ulcers, joint pain.    colcrys    sjogre aleve biotene voltaren [id number]         Past Medical History:     Past Medical History:   Diagnosis Date     Anemia      Anxiety      Arthritis      Behcet's disease (H)      Cervical adenitis May 2010     Chronic abdominal pain      Constipation, chronic 1994     Fibromyalgia      Gastro-oesophageal reflux disease      Gastroparesis      Irregular heart beat     tachycardia, has had workup     Migraines      Neuromuscular disorder (H)     fibramyalgia     Palpitations      PONV (postoperative nausea and vomiting)      Seizure (H)      Seizures (H)     unknown etiology     Syncope      Tourette's      Past Surgical History:   Procedure Laterality Date     ARTHROSCOPY ANKLE, OPEN REPAIR LIGAMENT, COMBINED Left  9/25/2019    Procedure: LEFT ANKLE ARTHROSCOPY WITH LIGAMENT REPAIR;  Surgeon: Andres Johnson DPM;  Location: [facility] OR     ARTHROSCOPY ANKLE, REPAIR LIGAMENT Left 1/2/2019    Procedure: Ankle arthroscopy and sinus tarsi evacuation, ligament repair, left lower extremity;  Surgeon: Andres Johnson DPM;  Location: [facility] OR     ARTHROSCOPY KNEE WITH PATELLAR REALIGNMENT  7/25/2013    Procedure: ARTHROSCOPY KNEE WITH PATELLAR REALIGNMENT;  Left Knee Arthroscopy, Medial Patellofemoral Ligament Reconstruction with Allograft  ;  Surgeon: Jennifer Acevedo MD;  Location: [facility]     COLONOSCOPY  2015     DENTAL SURGERY  1996    Teeth removal     ENDOSCOPY UPPER, COLONOSCOPY, COMBINED  2005     HC ESOPH/GAS REFLUX TEST W NASAL IMPED >1 HR N/A 2/15/2017    Procedure: ESOPHAGEAL IMPEDENCE FUNCTION TEST WITH 24 HOUR PH GREATER THAN 1 HOUR;  Surgeon: Timothy aMtta MD;  Location: [facility]     IR PICC PLACEMENT > 5 YRS OF AGE  3/13/2019     IR UPPER EXTREMITY VENOGRAM LEFT  2/25/2020     IRRIGATION AND DEBRIDEMENT FOOT, COMBINED Left 3/12/2019    Procedure: COMBINED IRRIGATION AND DEBRIDEMENT LEFT ANKLE;  Surgeon: Micha Glover MD;  Location: [facility]     IRRIGATION AND DEBRIDEMENT LOWER EXTREMITY, COMBINED Left 5/7/2019    Procedure: 1.  Excision of wound down to and including deep fascia, less than 20 cm2.  2.  Irrigation and debridement, left ankle.;  Surgeon: Andres Johnson DPM;  Location: [facility] OR     PICC INSERTION Left 05/05/2019    4Fr - 45cm (5cm external), Basilic vein, SVC RA junction          Social History:     Social History     Occupational History     Not on file   Tobacco Use     Smoking status: Never Smoker     Smokeless tobacco: Never Used   Substance and Sexual Activity     Alcohol use: Not Currently     Alcohol/week: 1.0 standard drinks     Types: 1 Standard drinks or equivalent per week     Comment: rarely     Drug use: Not Currently     Types: Marijuana     Comment: occassional  marijuana only, denies others     Sexual activity: Not Currently     Partners: Male     Birth control/protection: Pill          Family History:     Family History   Problem Relation Age of Onset     Depression Mother      Neurologic Disorder Mother         Migraines, take imitrex injection.  Also in maternal grandmother.       Alcohol/Drug Father      Hypertension Father      Depression Father      Osteoarthritis Father      Cardiovascular Maternal Grandmother      Depression Maternal Grandmother      Hypertension Maternal Grandmother      Alzheimer Disease Maternal Grandmother      Cardiovascular Maternal Grandfather      Hypertension Maternal Grandfather      Depression Maternal Grandfather      Alcohol/Drug Maternal Grandfather      Diabetes Maternal Grandfather      Cardiovascular Paternal Grandmother      Hypertension Paternal Grandmother      Cardiovascular Paternal Grandfather      Hypertension Paternal Grandfather      Glaucoma No family hx of      Macular Degeneration No family hx of           Allergies:     Allergies   Allergen Reactions     Amoxil [Penicillins] Rash     Dad unsure of reaction.     Bee Venom Anaphylaxis     Bioflavonoids Anaphylaxis     Citrus Anaphylaxis     All Nuckolls     Contrast Dye Rash     Contrast Media Ready-Box [facility] [place], 04/09/2014.; Contrast Media Ready-Box [facility] [place], 04/09/2014.  NOTE: this is a contrast media oral with iodine. Premedicate with methylpred standard for IV contrast, request barium contrast for oral contrast.     Diagnostic X-Ray Materials Hives and Rash     Contrast Media Ready-Box [facility] [place], 04/09/2014.; Contrast Media Ready-[employer] [facility] [place], 04/09/2014.  NOTE: this is a contrast media oral with iodine. Premedicate with methylpred standard for IV contrast, request barium contrast for oral contrast.     Pineapple Anaphylaxis, Difficulty breathing and Rash     Reglan [Metoclopramide] Other (See Comments)     IV dose only, in ER, rapid heart rate.     Ace Inhibitors      Difficulty in  breathing and GI upset     Amitiza [Lubiprostone] Nausea and Vomiting     Amoxicillin-Pot Clavulanate      Midazolam Unknown     parent states that when pt takes this medication, she wakes up being very violent .     No Clinical Screening - See Comments      Bleech/ chest tightness, itchy throat, swollen tongue, hives     Tizanidine Other (See Comments)     Confusion, back pain, photophobia, abdominal pain, shaking, anxious       Versed      Coming out of pelvic exam at age of 5, was kicking and screaming when coming out of the versed.     Adhesive Tape Rash     Azithromycin Hives and Rash     Cephalexin Itching and Rash          Medications:     Current Outpatient Medications   Medication Sig Dispense Refill     albuterol (PROAIR HFA/PROVENTIL HFA/VENTOLIN HFA) 108 (90 Base) MCG/ACT inhaler Inhale 2 puffs into the lungs every 6 hours as needed for shortness of breath / dyspnea or wheezing 1 Inhaler 1     amitriptyline (ELAVIL) 25 MG tablet TAKE 1 TABLET BY MOUTH EVERYDAY AT BEDTIME 90 tablet 1     artificial tears OINT ophthalmic ointment 0.5 inch strip each eye at night 1 Tube 11     benzocaine (TOPICALE XTRA) 20 % GEL Apply as needed locally to mouth or nasal ulcers for pain; 4 times daily as needed 30 g 1     betamethasone valerate (VALISONE) 0.1 % cream Apply topically 2 times daily as needed        bisacodyl (DULCOLAX) 5 MG EC tablet Take 2 tablets (10 mg) by mouth daily as needed for constipation 30 tablet 0     citalopram (CELEXA) 20 MG tablet Take 1 tablet (20 mg) by mouth daily 90 tablet 1     colchicine (COLCYRS) 0.6 MG tablet TAKE 1 TABLET (0.6 MG) BY MOUTH 2 TIMES DAILY 180 tablet 1     cyproheptadine (PERIACTIN) 4 MG tablet TAKE 2 TABLETS (8 MG) BY MOUTH AT BEDTIME FOR NIGHTMARES 180 tablet 1     diclofenac (VOLTAREN) 75 MG EC tablet Take 1 tablet (75 mg) by mouth 2 times daily as needed for moderate pain 60 tablet 1     dicyclomine (BENTYL) 20 MG tablet Take 1 tablet (20 mg) by mouth 4 times daily as  needed 120 tablet 3     diphenhydrAMINE (BENADRYL) 25 MG tablet Take 1 tablet (25 mg) by mouth 3 times daily as needed (itching) 40 tablet 1     EPINEPHrine (EPIPEN 2-EAMON) 0.3 MG/0.3ML injection Inject 0.3 mLs (0.3 mg) into the muscle as needed for anaphylaxis 0.6 mL 3     furosemide (LASIX) 20 MG tablet Take 1 tablet (20 mg) by mouth 3 times daily 60 tablet 3     gabapentin (NEURONTIN) 300 MG capsule TAKE 1 CAPSULE BY MOUTH THREE TIMES DAILY 90 capsule 1     guanFACINE (TENEX) 2 MG tablet Take one and one half tabs in the morning and one and one half in the evening. 270 tablet 3     hypromellose (GENTEAL) 0.3 % SOLN 1 drop every hour as needed for dry eyes        Lactase 9000 units CHEW Take 1 tablet by mouth daily 90 tablet 1     lactulose 20 GM/30ML SOLN Take 30 mLs by mouth 3 times daily as needed (for constipation) 300 mL 3     lidocaine (LMX4) 4 % external cream Apply topically once as needed for mild pain 120 g 1     lidocaine (LMX4) 4 % external cream Apply 1 Application topically once as needed for mild pain       LINZESS 290 MCG capsule TAKE 1 CAPSULE BY MOUTH EVERY DAY IN THE MORNING BEFORE BREAKFAST 90 capsule 0     LORazepam (ATIVAN) 0.5 MG tablet TAKE 1 TABLET BY MOUTH EVERY 6 HOURS AS NEEDED FOR ANXIETY 10 tablet 0     ondansetron (ZOFRAN-ODT) 8 MG ODT tab Take 1 tablet (8 mg) by mouth every 8 hours as needed for nausea 180 tablet 1     order for DME Equipment being ordered: Trilok brace 8 1/2 left lower extremity 1 Device 0     order for DME Equipment being ordered: size 8 1/2 walking boot tall 1 Device 0     polyethylene glycol (MIRALAX/GLYCOLAX) powder Take 1 capful by mouth 3 times daily       sucralfate (CARAFATE) 1 GM/10ML suspension Take 10 mLs (1 g) by mouth 4 times daily 1200 mL 2     triamcinolone (KENALOG) 0.5 % external ointment Apply 1 g topically 3 times daily as needed for irritation 15 g 3          Physical Exam:   Constitutional: well-developed, appearing stated age;  cooperative  Eyes: EOMI, nl conj, sclera  ENT: No oral ulcer seen.   MS: no synovitis  Skin: no rash in exposed areas  Neuro: Non focal  Psych: nl affect         Data:     CBC RESULTS:   Recent Labs   Lab Test  06/06/17 2048   WBC  4.7   RBC  4.09   HGB  12.0   HCT  35.9   MCV  88   MCH  29.3   MCHC  33.4   RDW  13.1   PLT  189       Liver Function Studies -   Recent Labs   Lab Test  06/06/17 2048   PROTTOTAL  6.7*   ALBUMIN  3.8   BILITOTAL  0.3   ALKPHOS  91   AST  26   ALT  44       Creatinine   Date Value Ref Range Status   09/07/2020 0.79 0.52 - 1.04 mg/dL Final   ]    No results found for: URIC]    HLA-B27  No results for input(s): [id number], B1 in the last 07926 hours.  RF/CCP  Recent Labs   Lab Test  05/06/16   1554   CCPIGG  1   RHF  <20     CYNDIE/RNP/Sm/SSA/SSB  Recent Labs   Lab Test  11/01/16   1318   TREPAB  Negative     ESR/CRP  Recent Labs   Lab Test  04/21/17   1249  04/14/17   1351  11/06/16   1341  03/11/16   1350  11/18/15   1453   SED   --    --   4  5  6   CRP  <2.9  <2.9  <2.9  <2.9  <2.9

## 2020-09-30 NOTE — LETTER
"9/30/2020       RE: Samara Oropeza  [address]  [address]  [facility] [id number]     Dear Colleague,    Thank you for referring your patient, Samara Oropeza, to the [facility] RHEUMATOLOGY at [facility]. Please see a copy of my visit note below.    Samara Oropeza is a 25 year old female who is being evaluated via a billable video visit.      The patient has been notified of following:     \"This video visit will be conducted via a call between you and your physician/provider. We have found that certain health care needs can be provided without the need for an in-person physical exam.  This service lets us provide the care you need with a video conversation.  If a prescription is necessary we can send it directly to your pharmacy.  If lab work is needed we can place an order for that and you can then stop by our lab to have the test done at a later time.    Video visits are billed at different rates depending on your insurance coverage.  Please reach out to your insurance provider with any questions.    If during the course of the call the physician/provider feels a video visit is not appropriate, you will not be charged for this service.\"    Patient has given verbal consent for Video visit? Yes  How would you like to obtain your AVS? MyChart  If you are dropped from the video visit, the video invite should be resent to: Send to e-mail at: [email or web address]  Will anyone else be joining your video visit? No        Video-Visit Details    Type of service:  Video Visit    Video Start Time:   Video End Time:     Originating Location (pt. 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 Lilliana Miramontes- complicated migraine  # On Sprintec for birth control   # Borderline transaminase elevation    12/18 Basic blood cell counts, liver and kidney function labs within normal limits    Meets ISG 1990 criteria for Behcets. Initially, very good response to colchicine which has reduced the intensity and frequency of the oral ulcers in the past. Patient relapsed with genital ulcers and few oral ulcers in the end of 2016 and also with folliculitis in skin. Seen Derm Dr. Elliott. He recommended otezla. Otezla is working for her and she takes twice daily 30mg PO daily. Chest pain , mouth sores, hand pain, morning pain are all better when she tried otezla 30mg twice daily. Currently off of otezla since about 8/18 because of frequent UTI per patient. Colchicine dose was reduced recently in hospital to 0.3mg daily per patient. Patient reports this was regarding antibiotic interaction. As patient's behcet's is flaring up I recommend increasing colchicine back to 0.6mg BID. No interaction with levaquin. If genital lesions do not improve, then see gynecology.     Neurology investigated for seizures and cause. Hospitalized 12/17. 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     Has tried prednisone in the past, but does not tolerate well due to mood issues, and has been off prednisone for the past 6+ months. Has H/o Tourettes. Followed by   [facility].     She continues to have GI symptoms  Colonoscopy and endoscopy negative 2018.  Has gastroparesis.  Behcets may have GI involvement but no evidence of GI inflammation at this time. Dr. Baker has evaluated her. Dr. Rollins did the endoscopies. Her abdominal pain is not related to otezla/colchicine as her abdominal symptoms were present even before they were started. This was verified with the patient.  Patient seeing [facility] currently. [facility] suggested 6 week therapy in [place] for pelvic floor muscles. Patient is not able to afford that and thinking of alternative options.     She gets visual symptoms  But there is no evidence of uveitis including posterior uveitis or retinal vasculitis, denies symptoms at today's visit. She has seen Dr. Garcia in the past and also seen Dr. Heaton around 2/16 and 9/17. Patient still getting double vision and floaters but 9/17 eye exam was stable.     She also likely has fibromyalgia. Currently managing her behcets.     For chest symptoms, she was referred to pulmonology by GI. CT chest negative for any lung issues 1/18    - Continue oral gel/Triamcinolone acetonide cream (0.1 percent in Orabase) three to four times daily during attacks of oral ulcers and be used until pain from the ulcer ceases. Potent topical corticosteroids may be used for genital ulcers. Also use magic mouthwash as needed.    No follow-ups on file.    No orders of the defined types were placed in this encounter.      There are no discontinued medications.  Current Outpatient Medications   Medication Sig Dispense Refill     albuterol (PROAIR HFA/PROVENTIL HFA/VENTOLIN HFA) 108 (90 Base) MCG/ACT inhaler Inhale 2 puffs into the lungs every 6 hours as needed for shortness of breath / dyspnea or wheezing 1 Inhaler 1     amitriptyline (ELAVIL) 25 MG tablet TAKE 1 TABLET BY MOUTH EVERYDAY AT BEDTIME 90 tablet 1     artificial tears OINT ophthalmic ointment 0.5 inch strip each eye at night 1 Tube 11      benzocaine (TOPICALE XTRA) 20 % GEL Apply as needed locally to mouth or nasal ulcers for pain; 4 times daily as needed 30 g 1     betamethasone valerate (VALISONE) 0.1 % cream Apply topically 2 times daily as needed        bisacodyl (DULCOLAX) 5 MG EC tablet Take 2 tablets (10 mg) by mouth daily as needed for constipation 30 tablet 0     citalopram (CELEXA) 20 MG tablet Take 1 tablet (20 mg) by mouth daily 90 tablet 1     colchicine (COLCYRS) 0.6 MG tablet TAKE 1 TABLET (0.6 MG) BY MOUTH 2 TIMES DAILY 180 tablet 1     cyproheptadine (PERIACTIN) 4 MG tablet TAKE 2 TABLETS (8 MG) BY MOUTH AT BEDTIME FOR NIGHTMARES 180 tablet 1     diclofenac (VOLTAREN) 75 MG EC tablet Take 1 tablet (75 mg) by mouth 2 times daily as needed for moderate pain 60 tablet 1     dicyclomine (BENTYL) 20 MG tablet Take 1 tablet (20 mg) by mouth 4 times daily as needed 120 tablet 3     diphenhydrAMINE (BENADRYL) 25 MG tablet Take 1 tablet (25 mg) by mouth 3 times daily as needed (itching) 40 tablet 1     EPINEPHrine (EPIPEN 2-EAMON) 0.3 MG/0.3ML injection Inject 0.3 mLs (0.3 mg) into the muscle as needed for anaphylaxis 0.6 mL 3     furosemide (LASIX) 20 MG tablet Take 1 tablet (20 mg) by mouth 3 times daily 60 tablet 3     gabapentin (NEURONTIN) 300 MG capsule TAKE 1 CAPSULE BY MOUTH THREE TIMES DAILY 90 capsule 1     guanFACINE (TENEX) 2 MG tablet Take one and one half tabs in the morning and one and one half in the evening. 270 tablet 3     hypromellose (GENTEAL) 0.3 % SOLN 1 drop every hour as needed for dry eyes        Lactase 9000 units CHEW Take 1 tablet by mouth daily 90 tablet 1     lactulose 20 GM/30ML SOLN Take 30 mLs by mouth 3 times daily as needed (for constipation) 300 mL 3     lidocaine (LMX4) 4 % external cream Apply topically once as needed for mild pain 120 g 1     lidocaine (LMX4) 4 % external cream Apply 1 Application topically once as needed for mild pain       LINZESS 290 MCG capsule TAKE 1 CAPSULE BY MOUTH EVERY DAY IN THE  MORNING BEFORE BREAKFAST 90 capsule 0     LORazepam (ATIVAN) 0.5 MG tablet TAKE 1 TABLET BY MOUTH EVERY 6 HOURS AS NEEDED FOR ANXIETY 10 tablet 0     ondansetron (ZOFRAN-ODT) 8 MG ODT tab Take 1 tablet (8 mg) by mouth every 8 hours as needed for nausea 180 tablet 1     order for DME Equipment being ordered: Trilok brace 8 1/2 left lower extremity 1 Device 0     order for DME Equipment being ordered: size 8 1/2 walking boot tall 1 Device 0     polyethylene glycol (MIRALAX/GLYCOLAX) powder Take 1 capful by mouth 3 times daily       sucralfate (CARAFATE) 1 GM/10ML suspension Take 10 mLs (1 g) by mouth 4 times daily 1200 mL 2     triamcinolone (KENALOG) 0.5 % external ointment Apply 1 g topically 3 times daily as needed for irritation 15 g 3     Austen Marquez MD.           Active Problem List:     Patient Active Problem List    Diagnosis Date Noted     Infection of joint of ankle (H) 05/06/2019     Priority: Medium     Cellulitis 03/09/2019     Priority: Medium     Displacement of lumbar intervertebral disc without myelopathy 11/13/2018     Priority: Medium     Overview:   Created by Conversion       Iron deficiency associated with nonfamilial restless legs syndrome 11/13/2018     Priority: Medium     Enthesopathy of hip region 11/13/2018     Priority: Medium     Overview:   Created by Conversion       Tourette's syndrome 11/13/2018     Priority: Medium     Overview:   Created by Conversion       Somatic symptom disorder 06/01/2018     Priority: Medium     Pelvic floor weakness 04/25/2018     Priority: Medium     Spells of decreased attentiveness 12/19/2017     Priority: Medium     Mobile cecum 11/09/2017     Priority: Medium     Cecum noted in Right lower quadrant on 4/17 CT scan, and in Left upper Quadrant on CT on 11/2017.       Vitamin D deficiency 10/11/2017     Priority: Medium     How low, unknown/not found. On D when tested at 27. Starting cholecalciferol October 2017. Needs recheck.        Convulsions, unspecified convulsion type (H) 10/03/2017     Priority: Medium     Transient alteration of awareness 10/03/2017     Priority: Medium     Chronic pain syndrome 07/27/2017     Priority: Medium     Major depressive disorder, recurrent episode, moderate (H) 06/27/2017     Priority: Medium     Cervical pain 05/02/2017     Priority: Medium     Acute left ankle pain 03/31/2017     Priority: Medium     Cervical dystonia 03/28/2017     Priority: Medium     PTSD (post-traumatic stress disorder) 01/17/2017     Priority: Medium     Patellofemoral instability 10/20/2016     Priority: Medium     Fibromyalgia 08/04/2016     Priority: Medium     Rheumatoid arthritis of multiple sites without rheumatoid factor (H) 08/04/2016     Priority: Medium     Raynaud's disease without gangrene 08/04/2016     Priority: Medium     Chronic abdominal pain 08/04/2016     Priority: Medium     Palpitations 01/12/2016     Priority: Medium     On colchicine therapy 10/30/2015     Priority: Medium     Spell of shaking 05/06/2015     Priority: Medium     Migraine 02/04/2015     Priority: Medium     Behcet's disease (H) 12/10/2014     Priority: Medium     Headaches due to old head injury 11/12/2013     Priority: Medium     Milk protein intolerance 10/11/2013     Priority: Medium     Intestinal malabsorption 10/11/2013     Priority: Medium     Concussion 02/13/2013     Priority: Medium     Jan 2013, with prolonged recovery- followed by sports med         Knee pain 01/03/2013     Priority: Medium     Generalized anxiety disorder 06/25/2009     Priority: Medium     Tics - Tourette syndrome 05/18/2009     Priority: Medium     Followed by psychotherapy. Symptoms well managed. Originally diagnosed at [facility] neurolog[facility]. (Dr. Simpson)           IBS (irritable bowel syndrome) 05/18/2009     Priority: Medium     Allergic rhinitis 05/18/2009     Priority: Medium     GERD (gastroesophageal reflux disease) 01/10/2008     Priority: Medium      Gastroparesis 1995     Priority: Medium          History of Present Illness:     Chief Complaint   Patient presents with     Video Visit     consult      Seen Dr. Marilyn Moran Rheumatology in [facility] 10/14:    Original presentation 2014:    Ms Oropeza is a 19 y.o. female who presents with one year of presentation of painful oral ulcers (monthly) once that have worsened with two episodes in the last two months that presented with painful vulvar or vaginal ulcers (2 episodes so far every month) [not sure if they leave scar] as well that timing coincides with menses ([facility]: 8/25/14).   ORAL ULCERS: last two episodes were severe, painful, white base with erythematous halo, that appear and disappear in the matter of 1-2 weeks without, does not bleed and doesn't respond to any treatment used (magic mouthwash), 10-15 in number with the biggest one being dime size.     VAGINAL ULCERS: 2-3 in number between labia majora and minora that occur simultaneously with oral ulcers, painful, non bleeding ulcers that are erythematous, no pus.     FOLLICULITIS: patient reports having spots in legs specially around ankle and knee, but arms, and neck are affected as well. Small lesions that resemble ingrown hair, most are red erythematous elevated lesions, well demarcated, some with liquid that patient refers as pimple like.     SKIN RASHES: welts and red macules with well defined borders that are pruritic and non-ulcerative on chest, arms and back. Benadryl relieves.     ARTHRALGIAS: In descending order of severity: hips, knees, wrists, shoulders, neck, lumbar, fingers.   C/o morning stiffness in hips, back and neck lasting for about until noon.     Ms. Oropeza reports they present in severe flares and never fully resolve, but do get better. She has seen some swelling and warmth on the affected joints but has not noticed and redness. Has tried Tylenol, ibuprofen, and hydrocodone with not much benefit.     FEVERS: Reports 3-4 sporadic  episodes per month of self limited fevers of 38 C that occur during the evenings and associate facial flushing.     HEADACHES: occur daily and are bitemporal and irradiate occipitally, pulsatile in nature, intensity varies from intense to mild, are worsened with movement. On treatment with ibuprofen and somatriptan without any positive results.     VISION CHANGES: Patient reports that suddenly she would only see a gray blotch in her right eyes and would persist like this for a day and a half. Also reports blurriness in her left eye. Presence of pain and burning sensation of eyeball with associated redness. Has changed prescription glasses in the matter of 2 years 3 times. She has had opthalmology exam and was not suggestive of any evidence of uveitis.   She was also evaluated in ED for a dizzy spell.     ABD PAIN W/ INTERMITTENT DIARRHEA AND CONSTIPATION: Patient reports abdominal pain that is intermittent, periods of 7 days without bowel movement and feeling bloated with change of pattern to diarrhea (liquidy without blood nor mucus, non foul smelling). Has taken Bentyl, Zegrid, and rinetidine with mild clinical improvement.  She also reports small red nodules after each needle stick for blood draw.   Neurology saw her back then and was not impressed with her presentation as physical examination was normal. Also MRI brain normal: Findings: No definite hemorrhage, mass affect, midline shift, or ventriculomegaly is noted.There are a few scattered non specific white matter T2 hyperintensities. Axial diffusion weighted images are unremarkable.     The major vascular structures appear patent. There is opacification and severe mucosal thickening in the right maxillary sinus. The remaining paranasal sinuses, and mastoid air cells are clear. Orbits are unremarkable  She has + HSV-1 IGG. Seen ID. Negative for Herpes simplex virus culture. HSV IGM I/II COMBINATION SENT TO [facility]  RESULTS = <0.91  REF RANGE: <0.91 =  NEGATIVE  ID did not think HSV could explain her mouth and genital sores.     CRP within normal limits. HIV negative. CBC within normal limits; UA negative. Creatinine within normal limits   CYNDIE neg.  Antigliadin neg.   ANti TTG neg.   [facility] 2014: Colonoscopy and endoscopy neg; result not available. Not sure if biopsies were done.   Raynaud's phenomenon:  Onset: about 2013  Digits: all fingers  Digital ulcers: no  Color changes: white ---> purple and red  Duration of an attack: About couple of hours per mom  Pain during attack: not clear pain but bruise like feeling  Frequency of attacks: few times a month  Family history of Raynauds: no  GERD: very long time    No hemoptysis.   No miscarriages/ no thrombosis in past.   No family or personal history of psoriasis, ulcerative colitis or chron's disease.   No buttock pain or low back pain or stiffness in AM    Interim history:  Dec 2014- Multiple mouth ulcers and did not eat for 5 days. This coincided with periods. Colchicine 0.6mg PO BID started in Nov 2014.   Prednisone taper in Dec 20mg to 0 in 4 weeks. She also used magic mouthwash. Kenalog cream. After going to the ER, 3 days later her ulcers were better. She thinks it improved after the menstruation stopped.   LMP 3 weeks ago.   COLCHICINE HAS HELPED A LOT REDUCING THE INTENSITY OF MENSTRUATION ASSOCIATED ORAL AND VULVAR ULCERS.     Interim history: 6/15    Since last visit only two episodes of mouth sores- small sized 6   No genital ulcers since last visit.   Colchicine 1.2 mg in AM and 0.6mg in PM keeps her symptoms under control.     Admitted in 5/15:  Admission Diagnoses:  - LUE weakness  - spell  - hx of migraines  - hx of Tourette syndrome  - hx of Behcet's disease    Discharge Diagnoses:   - spell likely 2/2 anxiety  - hx of migraines  - hx of Tourette syndrome  - hx of Behcet's disease    CT and MRI brain was normal.     Seeing Dr. Lilliana Miramontes soon as outpatient.     Dr. Garcia did not find any  "intraocular inflammation.      Interm 10/30/2015  ED for migraine. Continues to see neuro - MRI brain without lesions noted. Saw GI - upper barium normal. May be considering repeat colo. Worsening lesions in mouth and genitals. Has had continuous lesion in mouth x 2 months. 2 genital ulcers. Had fracture of right sesamoid in foot. Continues to have low grade fevers. New folliculitis on chest, legs and arms.     Interim hx: 1/11/2016    Pt states that since last visit she continues to have oral ulcers and has noted more folliculitis-like lesions on her lower extremities.  She states that on her right lower leg she had a red macular spot that has since resolved.  She denies uveitis.  She states that the colchicine initially helped but then stopped working.  She takes 0.6 mg TID.  She stopped taking her prednisone last week as she feels like this hasn't helped.  She hasn't had any episodes of vaginal ulcers.      She saw GI who stated she has gastroparesis.  She is seeing Cardiology later this week for possible syncopal episodes.      Interim history 5/16  Mouth ulcers X 1 last month  Genital ulcers - none since last visit.     Bilateral MCPs pain, knee pain and low back pain +ve increased since last visit  Morning stiffness X 2 hours (increasing)   5-6/10    \"overall myalgia\" has gotten worse    C/o pseudofolliculitis lesion on anterior shins bilaterally worse    Interim history: (7/18/2016)  Patient has just started Humira for 2 doses on 7/1 and 7/15 and reported minimal improvement of her hip pain but otherwise, did not notice anything different.     She reported painful mouth ulcer once a month since last visit but noticed that it has been getting deeper.   Denied any genital ulcers.    Still has ongoing bilateral MCPs pain, knee pain but this has been intermittent and she did not have any much pain today. She reported 2-3 hours morning stiffness of both hands and pain score of 6/10 at her knees and 8/10 of her " hands but currently takes no pain medication except prn ativan which she takes when her muscle is really tight.     The only concern is that she gained weight even though she has not been eating much (eat once a day) and do exercises every day for 1 hour (with video of yoga, piljt). Her mother wonders if she needs to have some labs work up include sex hormone, thyroid, cortisol.    Interval history 9/14/16  Patient was started Humira at the last visit, patient reports worsening of skin ulcers since starting Humira and stopped taking Humura for the past 2 weeks. Patient reports oral and genital ulcers has been stable even before starting Humira and has not had any interval oral/genital ulcers. However she reports recurrent skin ulcers occurring on a daily basis that affects her chest and anterior legs. Patient also has stopped taking prednisone, she reports that she doesn't feel the prednisone helps, her last dose of prednisone was 6+ months ago. Patient also report worsening low back pain that sometimes causes her to limp.    In the interval patient also visited ED on 8/31/16 for left hand pain and what the patient describes as phlebitis, no medication or procedure was done and the patient was discharged with a splint. Patient also reported 1 episode of chest pressure that was associated with dyspnea and numbness of the left arm, she was shopping in a supermarket at the time of onset, and the pressure resolved after she took a nap.    Patient denies any infectious symptoms in the interval, no fever, chills, night sweat, cough, dysuria, denies eye pain or visual changes.    November 4, 2016  Oct - had one genital ulcer  Oral ulcers X 5- around menstruation time.   Knee pain- chronic on the left side  Dizziness spells - on and off; been to the ER for that in the past.   On and off migraines  July 2013 - s/p MPFL reconstruction plus LRL 7/2013  Morning stiffness in knee (left) X 1 hour    Severe jaw pain and chest  pain- patient wondering if this is Tourette's or esophageal spasms. Cardiac workup negative.   Fever     January 13, 2017  Yesterday in ER [facility] with orthostatic syncope from dehydration.   Chest pain on and off still continues. Painful with deep breaths.   Oral ulcers - few minor ones lasting for one week;   One major one in roof of mouth lasting for about one week.   Knee pain +ve - reviewed the recent MRI with her orthopedic surgeon.   On and off migraines  otezla is approved. Still waiting to receive the meds.     March 31, 2017  Otezla current dose 15mg PO BID.   She is tolerating okay at this dose and is willing to increase the dose further up to 30mg BID.   Her joint pains are better on otezla. Knee pain is also better.   Back and neck pain +ve 8/10 when it is worse. Intramuscular botox injections were given on Monday in PMR.   2 minor genital ulcers in the interim- resolved.   Few oral ulcers in the interim- resolved.   Nasal ulcer - resolved.   Migraines on and off.   Nausea with otezla but improving.   Dizziness and blackouts on and off. She fell once and hurt her ankle. Wearing boot in left leg.   Complete ROS negative except for above    May 17, 2017  Tolerating otezla better. Not throwing up anymore. Current dose 20mg in AM and 30mg in PM (2nd week).   Patient thinks that her oral ulcers frequency and severity are improving with otezla. Also no genital ulcers in the interim.   Currently on doxycycline for bronchitis/?pneunomia. 4 more days left.     Joint pain history  2 weeks ago/ dx with pneumonia 1 week ago/  Involved joints: all over  Pain scale: 6.5/10   Wakes the patient from sleep : Yes  Morning stiffness: Yes for 120 minutes  Meds used:otezla,colchicine     Interim history  Since last visit:  1. Infections - Yes/ pneumonia  2. New symptoms/medical problem - Yes/ thinks she may have had a mini-seizure about 10 days ago ; did not seek medical attention; did not reoccur after that. Patient seeing  PCP today.  3. Any side effects from Rheum medications -vomiting a lot (resolved)  3. ER visits/Hospitalizations/surgeries - Yes  4. Last PCP visit: has an apt. today    Patient still getting double vision. Floaters present.     Therapist recommended psychiatry evaluation. Patient does not want to see psychaitry. Patient will see PCP today.     August 22, 2017  Have you ever seen a rheumatologist Yes Who You When 5/17/17    NO genital ulcers in the interim.   Mouth ulcers- three in the back of the throat and roof of the mouth last month.     Joint pain history  Onset: doing alittle worse / cut her otezla back to 30mg  Daily due to GI problems  Involved joints: all over her body. Been having more black out episodes, been having more chest pains.also has raised bumps on both legs from the knees down that itch and are painful   Pain scale:  5.5/10     Wakes the patient from sleep : Yes  Morning stiffness:Yes for 120 minutes  Meds used:otezla, colchicine (three pills daily)     Interim history  Since last visit:  1. Infections - Yes stomach issue  2. New symptoms/medical problem - No  3. Any side effects from Rheum medications -nausea from otezla  3. ER visits/Hospitalizations/surgeries - Yes, ER for foot, ankle injury from passing out and fell down the steps. Hit her head another time from passing out. Alcohol poisoning in July  4. Last PCP visit: 6/23/17 September 19, 2017  Have you ever seen a rheumatologist Yes Who You When 8/22/17  Joint pain history  Onset: pt states that she hurts everywhere for 6 days  Involved joints: all joints  Pain scale:  7/10     Wakes the patient from sleep : Yes/ not sleeping at all  Morning stiffness:Yes for 180 minutes  Meds used:colchicine, otezla, magic mouth wash, lidocaine gel  Last one week: increased joint pain; and genital ulcer  Genital lesion (dimed size) in the last one week  After botox a week ago, she had fever 101 for three days; near syncopes. Back pain;  floaters  Chest pain , mouth sores, hand pain, morning pain were all better with otezla 30mg twice daily.    Interim history  Since last visit:  1. Infections - No  2. New symptoms/medical problem - No  3. Any side effects from Rheum medications -nausea from the otezla  3. ER visits/Hospitalizations/surgeries - No  4. Last PCP visit: may 2017     October 18, 2017     Have you ever seen a rheumatologist Yes Who You When 9/19/17  Joint pain history  NO mouth or genital sores in the last 4 weeks.   Medrol dosepak helped with visual symptoms but not joint pains.     Onset: pt states that she is doing better than last time with her behcet's. Today has severe stomach pains, states that she is constipated. Pt had a seizure 2 days ago. Does have a metallic taste in her mouth  Involved joints: hands , neck, shoulders  Pain scale:  9/10 from stomach pain;      Wakes the patient from sleep : Yes  Morning stiffness:Yes for 120 minutes  Meds used:cochicine , otezla 30mg twice daily     Interim history  Since last visit:  1. Infections - No  2. New symptoms/medical problem - Yes/ the cartilage in her chest is inflamed  3. Any side effects from Rheum medications -nausea from the otezla  3. ER visits/Hospitalizations/surgeries - No  4. Last PCP visit: yesterday    January 16, 2018  Have you ever seen a rheumatologist Yes Who You When 10/18/17  Joint pain history  Onset: pt states that she was in the hospital for 5 days before maribell, they told her she had lesions in her brain in the white matter. Had blood work drawn in the ER and they told her her inflammatory markers were elevated. Was seen in the ER last week for vomiting and diarrhea, not eating. Saw Gastro. On 1/10/18. 1.5 weeks ago she had an endoscopy and colonoscopy. She has been having elbow  And hands and knee pain, loosing use of her hands. Been dropping things. Also states that she has been getting lesions up her nose  Involved joints: see above  Pain scale:  8/10      Wakes the patient from sleep : Yes  Morning stiffness:Yes for 120 minutes  Meds used:colchisine, otezla, magic mouth wash, kenolog cream, lidocaine gel     Interim history  Since last visit:  1. Infections - Yes/ had an infection in her jaw. Having sinus issues  2. New symptoms/medical problem - Yes/ states that she is having problems walking, states that she was bouncing when she was walking  3. Any side effects from Rheum medications -otezla, nausea  3. ER visits/Hospitalizations/surgeries - Yes/ see chart  4. Last PCP visit: November 2017 April 17, 2018  Have you ever seen a rheumatologist Yes Who You When 1/16/18  Joint pain history  Onset: pt states that she is not doing well. She is having increased stomach issues, only eats 2 times in 4 days unable to keep the otezla down due to vomiting . Has sleep study done in 1 week; no genital ulcers since last appointment. 6 small mouth sores since last appointment.   Involved joints: see above   Pain scale:  7/10     Wakes the patient from sleep : Yes  Morning stiffness:Yes for 60 minutes  Meds used:otezla , colchicine, diclofenac gel, magic mouthwash, lidocaine gel      Interim history  Since last visit:  1. Infections - Yes/ had a sinus infection, thinks she is getting sick now  2. New symptoms/medical problem - Yes/ neurology sent pt to cardiology. Has a heart condition but not sure what . She is seeing a ortho. Due to knee pain   3. Any side effects from Rheum medications -nausea/vomiting from the otezla   3. ER visits/Hospitalizations/surgeries - Yes/ seen in ER   4. Last PCP visit: yesterday    July 17, 2018  Have you ever seen a rheumatologist Yes Who You When 4/17/18  Joint pain history  Onset: pt Is here for a follow-up states that she started to get lesions on her legs , face and arm. Hurts all over, lower back is very painful. Right hand and wrist area are numb  Involved joints: see above  Pain scale:  7/10   worse in the morning  Wakes the patient from  sleep : Yes/ does not go to sleep till late  Morning stiffness:Yes for 4-5 hours minutes  Meds used:colchicine, otezla has  Not started otezla yet due to extreme nausea      Interim history  Since last visit:  1. Infections - Yes/ had a bacterial infection in her pelvic area was hospitalized  2. New symptoms/medical problem - Yes/ hands are swelling up. Having orthopedic issues. Was having problem with her veins sticking up, and very painful. Has happened multiply times   3. Any side effects from Rheum medications -see above  3. ER visits/Hospitalizations/surgeries - Yes/ hospital and ER for bacterial infection  4. 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 ER said may have blood clot in calf, but when did MRI, stated body may have broken it up on it's own. Elevated D-dimer  Involved joints: Knees, neck, hands  Pain scale:  6.5/10     Wakes the patient from sleep : Yes  Morning stiffness:Yes for 180 minutes  Meds used:hyoscyamine, not taking otezla. Last dose Sep. Patient did not want to take otezla with the antibiotics.     Last major mouth ulcer- aug or Sep  No genital ulcers in the last 6 months.      Interim history  Since last visit:  1. Infections - Yes, UTI's twice a month since last visit  2. New symptoms/medical problem - No  3. Any side effects from Rheum medications -otzela causes nausea  3. ER visits/Hospitalizations/surgeries - Yes, 1/2/19 repaired some ligaments and mass taken out, also joint build up removed from a previous injury  4. Last PCP visit: 12/5/19 June 12, 2019  Have you ever seen a rheumatologist yes Who you When 1/9/19  Joint pain history  Onset: Patient is here for a follow up. A lot of infections and in and out of the hospital, who wanted her to follow up with Rheumatology again.  Involved joints: knees, legs, back, neck,  shoulders, ankles  Pain scale:  6.5/10     Wakes the patient from sleep : Yes  Morning stiffness:Yes for 120 minutes  Meds used:magic mouthwash, colchicine     Interim history  Since last visit:  1. Infections - Yes, staph  2. New symptoms/medical problem - kidney failure  3. Any side effects from Rheum medications -none  3. ER visits/Hospitalizations/surgeries - Yes, 3 hospitalizations, and surgeries,  4. Last PCP visit: 6/11/19         Past Medical History:     Past Medical History:   Diagnosis Date     Anemia      Anxiety      Arthritis      Behcet's disease (H)      Cervical adenitis May 2010     Chronic abdominal pain      Constipation, chronic 1994     Fibromyalgia      Gastro-oesophageal reflux disease      Gastroparesis      Irregular heart beat     tachycardia, has had workup     Migraines      Neuromuscular disorder (H)     fibramyalgia     Palpitations      PONV (postoperative nausea and vomiting)      Seizure (H)      Seizures (H)     unknown etiology     Syncope      Tourette's      Past Surgical History:   Procedure Laterality Date     ARTHROSCOPY ANKLE, OPEN REPAIR LIGAMENT, COMBINED Left 9/25/2019    Procedure: LEFT ANKLE ARTHROSCOPY WITH LIGAMENT REPAIR;  Surgeon: Andres Johnson DPM;  Location:  OR     ARTHROSCOPY ANKLE, REPAIR LIGAMENT Left 1/2/2019    Procedure: Ankle arthroscopy and sinus tarsi evacuation, ligament repair, left lower extremity;  Surgeon: Andres Johnson DPM;  Location:  OR     ARTHROSCOPY KNEE WITH PATELLAR REALIGNMENT  7/25/2013    Procedure: ARTHROSCOPY KNEE WITH PATELLAR REALIGNMENT;  Left Knee Arthroscopy, Medial Patellofemoral Ligament Reconstruction with Allograft  ;  Surgeon: Jennifer Acevedo MD;  Location:  OR     COLONOSCOPY  2015     DENTAL SURGERY  1996    Teeth removal     ENDOSCOPY UPPER, COLONOSCOPY, COMBINED  2005      ESOPH/GAS REFLUX TEST W NASAL IMPED >1 HR N/A 2/15/2017    Procedure: ESOPHAGEAL IMPEDENCE FUNCTION TEST WITH 24 HOUR PH  GREATER THAN 1 HOUR;  Surgeon: Timothy Matta MD;  Location: [facility]     IR PICC PLACEMENT > 5 YRS OF AGE  3/13/2019     IR UPPER EXTREMITY VENOGRAM LEFT  2/25/2020     IRRIGATION AND DEBRIDEMENT FOOT, COMBINED Left 3/12/2019    Procedure: COMBINED IRRIGATION AND DEBRIDEMENT LEFT ANKLE;  Surgeon: Micha Glover MD;  Location: [facility]     IRRIGATION AND DEBRIDEMENT LOWER EXTREMITY, COMBINED Left 5/7/2019    Procedure: 1.  Excision of wound down to and including deep fascia, less than 20 cm2.  2.  Irrigation and debridement, left ankle.;  Surgeon: Andres Johnson DPM;  Location: [facility] OR     PICC INSERTION Left 05/05/2019    4Fr - 45cm (5cm external), Basilic vein, SVC RA junction          Social History:     Social History     Occupational History     Not on file   Tobacco Use     Smoking status: Never Smoker     Smokeless tobacco: Never Used   Substance and Sexual Activity     Alcohol use: Not Currently     Alcohol/week: 1.0 standard drinks     Types: 1 Standard drinks or equivalent per week     Comment: rarely     Drug use: Not Currently     Types: Marijuana     Comment: occassional marijuana only, denies others     Sexual activity: Not Currently     Partners: Male     Birth control/protection: Pill          Family History:     Family History   Problem Relation Age of Onset     Depression Mother      Neurologic Disorder Mother         Migraines, take imitrex injection.  Also in maternal grandmother.       Alcohol/Drug Father      Hypertension Father      Depression Father      Osteoarthritis Father      Cardiovascular Maternal Grandmother      Depression Maternal Grandmother      Hypertension Maternal Grandmother      Alzheimer Disease Maternal Grandmother      Cardiovascular Maternal Grandfather      Hypertension Maternal Grandfather      Depression Maternal Grandfather      Alcohol/Drug Maternal Grandfather      Diabetes Maternal Grandfather      Cardiovascular Paternal Grandmother       Hypertension Paternal Grandmother      Cardiovascular Paternal Grandfather      Hypertension Paternal Grandfather      Glaucoma No family hx of      Macular Degeneration No family hx of           Allergies:     Allergies   Allergen Reactions     Amoxil [Penicillins] Rash     Dad unsure of reaction.     Bee Venom Anaphylaxis     Bioflavonoids Anaphylaxis     Citrus Anaphylaxis     All Morrill     Contrast Dye Rash     Contrast Media Ready-Box [facility] [place], 04/09/2014.; Contrast Media Ready-Box [facility] [place], 04/09/2014.  NOTE: this is a contrast media oral with iodine. Premedicate with methylpred standard for IV contrast, request barium contrast for oral contrast.     Diagnostic X-Ray Materials Hives and Rash     Contrast Media Ready-Box [facility] [place], 04/09/2014.; Contrast Media Ready-[facility] [place], 04/09/2014.  NOTE: this is a contrast media oral with iodine. Premedicate with methylpred standard for IV contrast, request barium contrast for oral contrast.     Pineapple Anaphylaxis, Difficulty breathing and Rash     Reglan [Metoclopramide] Other (See Comments)     IV dose only, in ER, rapid heart rate.     Ace Inhibitors      Difficulty in breathing and GI upset     Amitiza [Lubiprostone] Nausea and Vomiting     Amoxicillin-Pot Clavulanate      Midazolam Unknown     parent states that when pt takes this medication, she wakes up being very violent .     No Clinical Screening - See Comments      Bleech/ chest tightness, itchy throat, swollen tongue, hives     Tizanidine Other (See Comments)     Confusion, back pain, photophobia, abdominal pain, shaking, anxious       Versed      Coming out of pelvic exam at age of 5, was kicking and screaming when coming out of the versed.     Adhesive Tape Rash     Azithromycin Hives and Rash     Cephalexin Itching and Rash          Medications:     Current Outpatient Medications   Medication Sig Dispense Refill     albuterol (PROAIR HFA/PROVENTIL HFA/VENTOLIN HFA) 108 (90 Base) MCG/ACT inhaler Inhale 2 puffs into  the lungs every 6 hours as needed for shortness of breath / dyspnea or wheezing 1 Inhaler 1     amitriptyline (ELAVIL) 25 MG tablet TAKE 1 TABLET BY MOUTH EVERYDAY AT BEDTIME 90 tablet 1     artificial tears OINT ophthalmic ointment 0.5 inch strip each eye at night 1 Tube 11     benzocaine (TOPICALE XTRA) 20 % GEL Apply as needed locally to mouth or nasal ulcers for pain; 4 times daily as needed 30 g 1     betamethasone valerate (VALISONE) 0.1 % cream Apply topically 2 times daily as needed        bisacodyl (DULCOLAX) 5 MG EC tablet Take 2 tablets (10 mg) by mouth daily as needed for constipation 30 tablet 0     citalopram (CELEXA) 20 MG tablet Take 1 tablet (20 mg) by mouth daily 90 tablet 1     colchicine (COLCYRS) 0.6 MG tablet TAKE 1 TABLET (0.6 MG) BY MOUTH 2 TIMES DAILY 180 tablet 1     cyproheptadine (PERIACTIN) 4 MG tablet TAKE 2 TABLETS (8 MG) BY MOUTH AT BEDTIME FOR NIGHTMARES 180 tablet 1     diclofenac (VOLTAREN) 75 MG EC tablet Take 1 tablet (75 mg) by mouth 2 times daily as needed for moderate pain 60 tablet 1     dicyclomine (BENTYL) 20 MG tablet Take 1 tablet (20 mg) by mouth 4 times daily as needed 120 tablet 3     diphenhydrAMINE (BENADRYL) 25 MG tablet Take 1 tablet (25 mg) by mouth 3 times daily as needed (itching) 40 tablet 1     EPINEPHrine (EPIPEN 2-EAMON) 0.3 MG/0.3ML injection Inject 0.3 mLs (0.3 mg) into the muscle as needed for anaphylaxis 0.6 mL 3     furosemide (LASIX) 20 MG tablet Take 1 tablet (20 mg) by mouth 3 times daily 60 tablet 3     gabapentin (NEURONTIN) 300 MG capsule TAKE 1 CAPSULE BY MOUTH THREE TIMES DAILY 90 capsule 1     guanFACINE (TENEX) 2 MG tablet Take one and one half tabs in the morning and one and one half in the evening. 270 tablet 3     hypromellose (GENTEAL) 0.3 % SOLN 1 drop every hour as needed for dry eyes        Lactase 9000 units CHEW Take 1 tablet by mouth daily 90 tablet 1     lactulose 20 GM/30ML SOLN Take 30 mLs by mouth 3 times daily as needed (for  constipation) 300 mL 3     lidocaine (LMX4) 4 % external cream Apply topically once as needed for mild pain 120 g 1     lidocaine (LMX4) 4 % external cream Apply 1 Application topically once as needed for mild pain       LINZESS 290 MCG capsule TAKE 1 CAPSULE BY MOUTH EVERY DAY IN THE MORNING BEFORE BREAKFAST 90 capsule 0     LORazepam (ATIVAN) 0.5 MG tablet TAKE 1 TABLET BY MOUTH EVERY 6 HOURS AS NEEDED FOR ANXIETY 10 tablet 0     ondansetron (ZOFRAN-ODT) 8 MG ODT tab Take 1 tablet (8 mg) by mouth every 8 hours as needed for nausea 180 tablet 1     order for DME Equipment being ordered: Trilok brace 8 1/2 left lower extremity 1 Device 0     order for DME Equipment being ordered: size 8 1/2 walking boot tall 1 Device 0     polyethylene glycol (MIRALAX/GLYCOLAX) powder Take 1 capful by mouth 3 times daily       sucralfate (CARAFATE) 1 GM/10ML suspension Take 10 mLs (1 g) by mouth 4 times daily 1200 mL 2     triamcinolone (KENALOG) 0.5 % external ointment Apply 1 g topically 3 times daily as needed for irritation 15 g 3          Physical Exam:   not currently breastfeeding.  Wt Readings from Last 4 Encounters:   09/29/20 75.8 kg (167 lb)   09/15/20 75.8 kg (167 lb)   07/27/20 76 kg (167 lb 8 oz)   01/14/20 71.7 kg (158 lb)     Constitutional: well-developed, appearing stated age; cooperative  ENT: No oral ulcer seen.   No mucous membrane lesions, normal saliva pool  Resp: lungs clear to auscultation  MS: Left foot braces/p ankle surgery.   All shoulder, elbow, wrist, MCP/PIP/DIP, hip, ankle joints were examined and  found normal. No active synovitis or deformity.   Skin: no rash in exposed areas  Psych: nl judgement, orientation, memory, affect.         Data:     CBC RESULTS:   Recent Labs   Lab Test  06/06/17 2048   WBC  4.7   RBC  4.09   HGB  12.0   HCT  35.9   MCV  88   MCH  29.3   MCHC  33.4   RDW  13.1   PLT  189       Liver Function Studies -   Recent Labs   Lab Test  06/06/17 2048   PROTTOTAL  6.7*    ALBUMIN  3.8   BILITOTAL  0.3   ALKPHOS  91   AST  26   ALT  44       Creatinine   Date Value Ref Range Status   09/07/2020 0.79 0.52 - 1.04 mg/dL Final   ]    No results found for: URIC]    HLA-B27  No results for input(s): U99EZELTOF, B1 in the last 78392 hours.  RF/CCP  Recent Labs   Lab Test  05/06/16   1554   CCPIGG  1   RHF  <20     CYNDIE/RNP/Sm/SSA/SSB  Recent Labs   Lab Test  11/01/16   1318   TREPAB  Negative     ESR/CRP  Recent Labs   Lab Test  04/21/17   1249  04/14/17   1351  11/06/16   1341  03/11/16   1350  11/18/15   1453   SED   --    --   4  5  6   CRP  <2.9  <2.9  <2.9  <2.9  <2.9       h

## 2020-09-30 NOTE — PATIENT INSTRUCTIONS
Labs today    Add otezla    When you start otezla, drop colchicine to one tab of 0.6 mg a day as needed    Try over the counter aleve, voltaren gel, biotene products    Please talk to your  about referral to women's health for contraception     GI, derm referral after getting insurance back    Return visit (virtual) in 3 months

## 2020-10-01 ENCOUNTER — TELEPHONE (OUTPATIENT)
Dept: RHEUMATOLOGY | Facility: CLINIC | Age: 25
End: 2020-10-01

## 2020-10-01 LAB
CARDIOLIPIN IGA SERPL-ACNC: <0.5 APL U/ML (ref 0–19.9)
CRP SERPL-MCNC: <2.9 MG/L (ref 0–8)
DEPRECATED CALCIDIOL+CALCIFEROL SERPL-MC: 18 UG/L (ref 20–75)
ENA RNP IGG SER IA-ACNC: <0.2 AI (ref 0–0.9)
ENA SM IGG SER-ACNC: <0.2 AI (ref 0–0.9)
ENA SS-A IGG SER IA-ACNC: <0.2 AI (ref 0–0.9)
ENA SS-B IGG SER IA-ACNC: <0.2 AI (ref 0–0.9)
FERRITIN SERPL-MCNC: 4 NG/ML (ref 12–150)
HBV CORE AB SERPL QL IA: NONREACTIVE
HBV SURFACE AG SERPL QL IA: NONREACTIVE
HCV AB SERPL QL IA: NONREACTIVE
MYELOPEROXIDASE AB SER-ACNC: <0.2 AI (ref 0–0.9)
PROTEINASE3 IGG SER-ACNC: <0.2 AI (ref 0–0.9)

## 2020-10-02 LAB
ANA PAT SER IF-IMP: ABNORMAL
ANA SER QL IF: ABNORMAL
ANA TITR SER IF: ABNORMAL {TITER}
ANCA AB PATTERN SER IF-IMP: NORMAL
B2 GLYCOPROT1 IGA SER-ACNC: 0.6 U/ML
B2 GLYCOPROT1 IGG SERPL IA-ACNC: <0.6 U/ML
B2 GLYCOPROT1 IGM SERPL IA-ACNC: <2.9 U/ML
C-ANCA TITR SER IF: NORMAL {TITER}
C3 SERPL-MCNC: 111 MG/DL (ref 81–157)
C4 SERPL-MCNC: 19 MG/DL (ref 13–39)
DSDNA AB SER-ACNC: 4 IU/ML
GAMMA INTERFERON BACKGROUND BLD IA-ACNC: 0.04 IU/ML
LA PPP-IMP: NEGATIVE
M TB IFN-G CD4+ BCKGRND COR BLD-ACNC: 9.96 IU/ML
M TB TUBERC IFN-G BLD QL: NEGATIVE
MITOGEN IGNF BCKGRD COR BLD-ACNC: 0 IU/ML
MITOGEN IGNF BCKGRD COR BLD-ACNC: 0 IU/ML
RHEUMATOID FACT SER NEPH-ACNC: <7 IU/ML (ref 0–20)

## 2020-10-04 ENCOUNTER — MYC MEDICAL ADVICE (OUTPATIENT)
Dept: PODIATRY | Facility: CLINIC | Age: 25
End: 2020-10-04

## 2020-10-05 NOTE — TELEPHONE ENCOUNTER
Per Dr. Johnson patient to schedule appointment next available.     Left voicemail for patient to call and schedule an appointment for next week when Dr. Johnson is back in the office. Provided scheduling number. Also sent [employer] message.     BRIDGER Juares RN

## 2020-10-05 NOTE — TELEPHONE ENCOUNTER
Please see [employer] message regarding response to ankle cortisone injection done on 9/15/20.   Per chart review, patient also had left knee cortisone injection on 9/29/20 by Dr. Yeo.    Phone call to patient. Knee injection provided some numbness/relief for about a day and then pain has returned. Is 6 days post injection.     The ankle injection has helped the ankle pain and that is mostly resolved. However, she is still having lateral left leg pain from knee to above the ankle and on top of her whole foot. She reports swelling of the top of her foot that is new.   She reports using the treadmill with an incline on 10/2/20 and felt a sharp pain in the top of her foot. She then went home and elevated and iced her foot. Swelling gets worse by the end of the day, and better first thing in the morning. Denies redness.   Will discuss with provider for recommendations and get back with her.     Please advise if you recommend patient be worked in with you this week, or if can wait until next week when you are off call.    BRIDGER Juares RN

## 2020-10-14 NOTE — TELEPHONE ENCOUNTER
Messaged provider on no response from patient - she agreed and indicated that no more attempts to contact the patient is appropriate, closing encounter

## 2020-10-20 ENCOUNTER — OFFICE VISIT (OUTPATIENT)
Dept: PHYSICAL MEDICINE AND REHAB | Facility: CLINIC | Age: 25
End: 2020-10-20
Payer: MEDICAID

## 2020-10-20 DIAGNOSIS — G43.719 CHRONIC MIGRAINE WITHOUT AURA, INTRACTABLE, WITHOUT STATUS MIGRAINOSUS: Primary | ICD-10-CM

## 2020-10-20 PROCEDURE — 64615 CHEMODENERV MUSC MIGRAINE: CPT | Performed by: PHYSICAL MEDICINE & REHABILITATION

## 2020-10-20 PROCEDURE — 99207 PR NO CHARGE INJECTABLE MED/DRUG: CPT | Mod: GC | Performed by: PHYSICAL MEDICINE & REHABILITATION

## 2020-10-20 RX ORDER — BUPIVACAINE HYDROCHLORIDE 2.5 MG/ML
4 INJECTION, SOLUTION EPIDURAL; INFILTRATION; INTRACAUDAL ONCE
Status: COMPLETED | OUTPATIENT
Start: 2020-10-20 | End: 2020-10-20

## 2020-10-20 RX ADMIN — BUPIVACAINE HYDROCHLORIDE 10 MG: 2.5 INJECTION, SOLUTION EPIDURAL; INFILTRATION; INTRACAUDAL at 13:43

## 2020-10-20 NOTE — PROGRESS NOTES
BOTULINUM TOXIN PROCEDURE - HEADACHE - NOTE    No chief complaint on file.    There were no vitals taken for this visit.     Current Outpatient Medications:      albuterol (PROAIR HFA/PROVENTIL HFA/VENTOLIN HFA) 108 (90 Base) MCG/ACT inhaler, Inhale 2 puffs into the lungs every 6 hours as needed for shortness of breath / dyspnea or wheezing, Disp: 1 Inhaler, Rfl: 1     amitriptyline (ELAVIL) 25 MG tablet, TAKE 1 TABLET BY MOUTH EVERYDAY AT BEDTIME, Disp: 90 tablet, Rfl: 1     Apremilast (OTEZLA) 10 & 20 & 30 MG TBPK, Take by mouth according to the instructions on the packet. Hold for signs of infection,any GI upset, weight loss or depression concerns, and seek medical attention., Disp: 1 each, Rfl: 0     apremilast (OTEZLA) 30 MG tablet, Take 1 tablet (30 mg) by mouth 2 times daily Hold for signs of infection, and seek medical attention., Disp: 180 tablet, Rfl: 3     artificial tears OINT ophthalmic ointment, 0.5 inch strip each eye at night, Disp: 1 Tube, Rfl: 11     benzocaine (TOPICALE XTRA) 20 % GEL, Apply as needed locally to mouth or nasal ulcers for pain; 4 times daily as needed, Disp: 30 g, Rfl: 1     betamethasone valerate (VALISONE) 0.1 % cream, Apply topically 2 times daily as needed , Disp: , Rfl:      bisacodyl (DULCOLAX) 5 MG EC tablet, Take 2 tablets (10 mg) by mouth daily as needed for constipation, Disp: 30 tablet, Rfl: 0     citalopram (CELEXA) 20 MG tablet, Take 1 tablet (20 mg) by mouth daily, Disp: 90 tablet, Rfl: 1     colchicine (COLCYRS) 0.6 MG tablet, TAKE 1 TABLET (0.6 MG) BY MOUTH 2 TIMES DAILY, Disp: 180 tablet, Rfl: 1     cyproheptadine (PERIACTIN) 4 MG tablet, TAKE 2 TABLETS (8 MG) BY MOUTH AT BEDTIME FOR NIGHTMARES, Disp: 180 tablet, Rfl: 1     diclofenac (VOLTAREN) 75 MG EC tablet, Take 1 tablet (75 mg) by mouth 2 times daily as needed for moderate pain, Disp: 60 tablet, Rfl: 1     dicyclomine (BENTYL) 20 MG tablet, Take 1 tablet (20 mg) by mouth 4 times daily as needed, Disp: 120  tablet, Rfl: 3     diphenhydrAMINE (BENADRYL) 25 MG tablet, Take 1 tablet (25 mg) by mouth 3 times daily as needed (itching), Disp: 40 tablet, Rfl: 1     EPINEPHrine (EPIPEN 2-EAMON) 0.3 MG/0.3ML injection, Inject 0.3 mLs (0.3 mg) into the muscle as needed for anaphylaxis, Disp: 0.6 mL, Rfl: 3     furosemide (LASIX) 20 MG tablet, Take 1 tablet (20 mg) by mouth 3 times daily, Disp: 60 tablet, Rfl: 3     gabapentin (NEURONTIN) 300 MG capsule, TAKE 1 CAPSULE BY MOUTH THREE TIMES DAILY, Disp: 90 capsule, Rfl: 1     guanFACINE (TENEX) 2 MG tablet, Take one and one half tabs in the morning and one and one half in the evening., Disp: 270 tablet, Rfl: 3     hypromellose (GENTEAL) 0.3 % SOLN, 1 drop every hour as needed for dry eyes , Disp: , Rfl:      Lactase 9000 units CHEW, Take 1 tablet by mouth daily, Disp: 90 tablet, Rfl: 1     lactulose 20 GM/30ML SOLN, Take 30 mLs by mouth 3 times daily as needed (for constipation), Disp: 300 mL, Rfl: 3     lidocaine (LMX4) 4 % external cream, Apply topically once as needed for mild pain, Disp: 120 g, Rfl: 1     lidocaine (LMX4) 4 % external cream, Apply 1 Application topically once as needed for mild pain, Disp: , Rfl:      LINZESS 290 MCG capsule, TAKE 1 CAPSULE BY MOUTH EVERY DAY IN THE MORNING BEFORE BREAKFAST, Disp: 90 capsule, Rfl: 0     LORazepam (ATIVAN) 0.5 MG tablet, TAKE 1 TABLET BY MOUTH EVERY 6 HOURS AS NEEDED FOR ANXIETY, Disp: 10 tablet, Rfl: 0     ondansetron (ZOFRAN-ODT) 8 MG ODT tab, Take 1 tablet (8 mg) by mouth every 8 hours as needed for nausea, Disp: 180 tablet, Rfl: 1     order for DME, Equipment being ordered: Trilok brace 8 1/2 left lower extremity, Disp: 1 Device, Rfl: 0     order for DME, Equipment being ordered: size 8 1/2 walking boot tall, Disp: 1 Device, Rfl: 0     polyethylene glycol (MIRALAX/GLYCOLAX) powder, Take 1 capful by mouth 3 times daily, Disp: , Rfl:      sucralfate (CARAFATE) 1 GM/10ML suspension, Take 10 mLs (1 g) by mouth 4 times daily,  Disp: 1200 mL, Rfl: 2     triamcinolone (KENALOG) 0.5 % external ointment, Apply 1 g topically 3 times daily as needed for irritation, Disp: 15 g, Rfl: 3    Current Facility-Administered Medications:      lidocaine 1 % injection 0.5 mL, 0.5 mL, , , Andres Johnson, DPM, 0.5 mL at 09/15/20 1554     methylPREDNISolone (DEPO-MEDROL) injection 40 mg, 40 mg, , , Yeo, Albert, MD, 40 mg at 09/29/20 1154     triamcinolone (KENALOG-40) injection 40 mg, 40 mg, , , Andres Johnson, DPM, 40 mg at 09/15/20 1554     Allergies   Allergen Reactions     Amoxil [Penicillins] Rash     Dad unsure of reaction.     Bee Venom Anaphylaxis     Bioflavonoids Anaphylaxis     Citrus Anaphylaxis     All Armour     Contrast Dye Rash     Contrast Media Ready-Box [facility] [place] 04[place]09/2014.; Contrast Media Ready-[facility] [place], 04/09/2014.  NOTE: this is a contrast media oral with iodine. Premedicate with methylpred standard for IV contrast, request barium contrast for oral contrast.     Diagnostic X-Ray Materials Hives and Rash     Contrast Media [employer]-[facility] [place], 04/09/2014.; Contrast [facility] [place], 04/09/2014.  NOTE: this is a contrast media oral with iodine. Premedicate with methylpred standard for IV contrast, request barium contrast for oral contrast.     Pineapple Anaphylaxis, Difficulty breathing and Rash     Reglan [Metoclopramide] Other (See Comments)     IV dose only, in ER, rapid heart rate.     Ace Inhibitors      Difficulty in breathing and GI upset     Amitiza [Lubiprostone] Nausea and Vomiting     Amoxicillin-Pot Clavulanate      Midazolam Unknown     parent states that when pt takes this medication, she wakes up being very violent .     No Clinical Screening - See Comments      Bleech/ chest tightness, itchy throat, swollen tongue, hives     Tizanidine Other (See Comments)     Confusion, back pain, photophobia, abdominal pain, shaking, anxious       Versed      Coming out of pelvic exam at age of 5, was kicking and screaming when  "coming out of the versed.     Adhesive Tape Rash     Azithromycin Hives and Rash     Cephalexin Itching and Rash        PHYSICAL EXAM:  Pt reports \"small\" headache today, which has been present for 3 days, rates 4/10.      HPI:  Patient reports the following new medical problems since last visit: ED visit for accidental overdose in the setting of medication changes.     Patient reports good benefit from last series of Botox injections, which only started to wear off about 1 month ago. Noting more tightness in the neck muscles.    We reviewed the recommended safety guidelines for  Botox from any vaccine injection, such as the seasonal flu vaccine, by a minimum of 10-14 days with Samara Oropeza. She acknowledged understanding.      RESPONSE TO PREVIOUS TREATMENT:  Change in headache pattern following last series of injections with 185 units of  Botox on 7/28/2020.     Post-procedural headache: Rated as 'Mild' severity.  Duration: 3-4 days then completely resolved    1.  Headache frequency during this injection cycle:  2 headaches per month. 1 month ago as Botox was wearing off she noticed increased headaches occurring every couple of days. This is compared to her baseline headache frequency of 30 headache days per month. Significantly less headaches this injection cycle compared to previous.     2.  Headache duration during this injection cycle:  Headache duration ranged from 2 hours to 6 hours.   Patient reports one episodes of multiple day headaches during this injection cycle.    3.  Headache intensity during this injection cycle:    A.  6/10  =  Typical pain level.    B.  9/10  =  Worst pain level.   C.  2/10  =  Lowest pain level.    4.  Change in headache medication usage during this injection cycle:  (For Example:  Able to decrease use of oral pain medications.) Patient reports that she did not utilize any migraine pain medication during this injection cycle, other than Extra Strength Tylenol " during her recent 3 day migraine.     5.  ER Visits During This Injection Cycle:  None due to migraine headache.     6.  Functional Performance:  Change in ADL's, social interaction, days lost from work, etc. 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       BOTULINUM NEUROTOXIN INJECTION PROCEDURES:    VERIFICATION OF PATIENT IDENTIFICATION AND PROCEDURE     Initials   Patient Name jmb   Patient  jmb   Procedure Verified by: cali     Prior to the start of the procedure and with procedural staff participation, I verbally confirmed the patient s identity using two indicators, relevant allergies, that the procedure was appropriate and matched the consent or emergent situation, and that the correct equipment/implants were available. Immediately prior to starting the procedure I conducted the Time Out with the procedural staff and re-confirmed the patient s name, procedure, and site/side. (The Joint Commission universal protocol was followed.)  Yes    Sedation (Moderate or Deep): None    Above assessments performed by:  Reza Jerome, Resident [facility]ENT[facility]          The attending provider was present for the entire procedure documented below.    Alejandrina Hernandez MD     INDICATIONS FOR PROCEDURES:  Samara Oropeza is a 25 year old patient with a complex medical history that includes chronic migraine headaches associated with cervicogenic components.      Her baseline symptoms have been recalcitrant to oral medications and conservative therapy.  She is here today for re-evaluation and it was decided to reinject with Botox.     GOAL OF PROCEDURE:  The goal of this procedure is to decrease pain.    TOTAL DOSE: 200 UNITS BOTOX  Dose Administered:  200 units  Botox (Botulinum Toxin Type A)       2:1 Dilution   Diluent Used:  0.25% Sensorcaine  (NDC: 03745-637-34,  Lot: [id number],  Exp: 2023)  Total Volume of Diluent Used:  4 ml  Lot # /C3 with Expiration Date:  2023  NDC #: Botox 100u ([address]-37)      Medication guide was offered to patient and was declined.    CONSENT:  The risks, benefits, and treatment options were discussed with Samara Oropeza and she agreed to proceed.    Written consent was obtained by cali.     EQUIPMENT USED:  Needle-30 gauge    SKIN PREPARATION:  Skin preparation was performed using an alcohol wipe.     GUIDANCE DESCRIPTION:  Electro-myographic guidance was necessary throughout the shoulder and neck muscles to accurately identify all areas of dystonic muscles while avoiding injection of non-dystonic muscles and non-spastic muscles, neighboring nerves and nearby vascular structures.     AREA/MUSCLE INJECTED:  200 UNITS BOTOX = TOTAL DOSE     1 & 2. SHOULDER GIRDLE & NECK MUSCLES: 50 units Botox = Total Dose, 2:1 Dilution      Right Splenius Cervicis - 5 units of Botox at 1 site/s.   Left Splenius Cervicis - 5 units of Botox at 1 site/s.     Right Rectus Capitis - 2.5 units of Botox at 1 site.  Left Rectus Capitis - 2.5 units of Botox at 1site.     Right Lateral Trapezius - 10 units of Botox at 4 sites/s.  Left Lateral Trapezius - 10 units of Botox at 4 site/s.      Right Levator Scapulae - 7.5 units of Botox at 2 site/s.   Left Levator Scapulae - 7.5 units of Botox at 2 site/s.       3. HEAD & SCALP MUSCLES: 150 units Botox = Total Dose, 2:1 Dilution     Right Occipitalis - 5 units of Botox at 3 site/s.   Left Occipitalis - 5 units of Botox at 3 site/s.     Right Frontalis - 15 units of Botox at 4 site/s.  Left Frontalis - 15 units of Botox at 4 site/s.     Right Temporalis - 47.5 units of Botox at 8 site/s.  Left Temporalis - 47.5 units of Botox at 8 site/s.     Right  - 5 units of Botox at 1 site/s.              Left  - 5 units of Botox at 1 site/s.                 Procerus - 5 units of Botox at 1 site/s.      RESPONSE TO PROCEDURE:  Samara Oropeza tolerated the procedure well and there were no immediate complications.   She was allowed to recover for an  appropriate period of time and was discharged home in stable condition.    FOLLOW UP:  Samara Oropeza was asked to follow up by phone in 7-14 days with Sharmila yLnn PT, Care Coordinator or Vidya Dickinson RN, Care Coordinator, to report her response to this series of injections.  Based on the patient's previous response to this therapy, Samara Oropeza was rescheduled for the next series of injections in 12 weeks.    PLAN (Medication Changes, Therapy Orders, Work or Disability Issues, etc.): Dose was increased to 200 units with additional Botox delivered to the shoulder and neck muscles.  Patient will continue to monitor response to today's injections.

## 2020-10-20 NOTE — LETTER
10/20/2020       RE: Samara Oropeza  [address]  [address]  [facility] [place] [address]     Dear Colleague,    Thank you for referring your patient, Samara Oropeza, to the [facility] [place] at [facility]. Please see a copy of my visit note below.    BOTULINUM TOXIN PROCEDURE - HEADACHE - NOTE    No chief complaint on file.    There were no vitals taken for this visit.     Current Outpatient Medications:      albuterol (PROAIR HFA/PROVENTIL HFA/VENTOLIN HFA) 108 (90 Base) MCG/ACT inhaler, Inhale 2 puffs into the lungs every 6 hours as needed for shortness of breath / dyspnea or wheezing, Disp: 1 Inhaler, Rfl: 1     amitriptyline (ELAVIL) 25 MG tablet, TAKE 1 TABLET BY MOUTH EVERYDAY AT BEDTIME, Disp: 90 tablet, Rfl: 1     Apremilast (OTEZLA) 10 & 20 & 30 MG TBPK, Take by mouth according to the instructions on the packet. Hold for signs of infection,any GI upset, weight loss or depression concerns, and seek medical attention., Disp: 1 each, Rfl: 0     apremilast (OTEZLA) 30 MG tablet, Take 1 tablet (30 mg) by mouth 2 times daily Hold for signs of infection, and seek medical attention., Disp: 180 tablet, Rfl: 3     artificial tears OINT ophthalmic ointment, 0.5 inch strip each eye at night, Disp: 1 Tube, Rfl: 11     benzocaine (TOPICALE XTRA) 20 % GEL, Apply as needed locally to mouth or nasal ulcers for pain; 4 times daily as needed, Disp: 30 g, Rfl: 1     betamethasone valerate (VALISONE) 0.1 % cream, Apply topically 2 times daily as needed , Disp: , Rfl:      bisacodyl (DULCOLAX) 5 MG EC tablet, Take 2 tablets (10 mg) by mouth daily as needed for constipation, Disp: 30 tablet, Rfl: 0     citalopram (CELEXA) 20 MG tablet, Take 1 tablet (20 mg) by mouth daily, Disp: 90 tablet, Rfl: 1     colchicine (COLCYRS) 0.6 MG tablet, TAKE 1 TABLET (0.6 MG) BY MOUTH 2 TIMES DAILY, Disp: 180 tablet, Rfl: 1      cyproheptadine (PERIACTIN) 4 MG tablet, TAKE 2 TABLETS (8 MG) BY MOUTH AT BEDTIME FOR NIGHTMARES, Disp: 180 tablet, Rfl: 1     diclofenac (VOLTAREN) 75 MG EC tablet, Take 1 tablet (75 mg) by mouth 2 times daily as needed for moderate pain, Disp: 60 tablet, Rfl: 1     dicyclomine (BENTYL) 20 MG tablet, Take 1 tablet (20 mg) by mouth 4 times daily as needed, Disp: 120 tablet, Rfl: 3     diphenhydrAMINE (BENADRYL) 25 MG tablet, Take 1 tablet (25 mg) by mouth 3 times daily as needed (itching), Disp: 40 tablet, Rfl: 1     EPINEPHrine (EPIPEN 2-EAMON) 0.3 MG/0.3ML injection, Inject 0.3 mLs (0.3 mg) into the muscle as needed for anaphylaxis, Disp: 0.6 mL, Rfl: 3     furosemide (LASIX) 20 MG tablet, Take 1 tablet (20 mg) by mouth 3 times daily, Disp: 60 tablet, Rfl: 3     gabapentin (NEURONTIN) 300 MG capsule, TAKE 1 CAPSULE BY MOUTH THREE TIMES DAILY, Disp: 90 capsule, Rfl: 1     guanFACINE (TENEX) 2 MG tablet, Take one and one half tabs in the morning and one and one half in the evening., Disp: 270 tablet, Rfl: 3     hypromellose (GENTEAL) 0.3 % SOLN, 1 drop every hour as needed for dry eyes , Disp: , Rfl:      Lactase 9000 units CHEW, Take 1 tablet by mouth daily, Disp: 90 tablet, Rfl: 1     lactulose 20 GM/30ML SOLN, Take 30 mLs by mouth 3 times daily as needed (for constipation), Disp: 300 mL, Rfl: 3     lidocaine (LMX4) 4 % external cream, Apply topically once as needed for mild pain, Disp: 120 g, Rfl: 1     lidocaine (LMX4) 4 % external cream, Apply 1 Application topically once as needed for mild pain, Disp: , Rfl:      LINZESS 290 MCG capsule, TAKE 1 CAPSULE BY MOUTH EVERY DAY IN THE MORNING BEFORE BREAKFAST, Disp: 90 capsule, Rfl: 0     LORazepam (ATIVAN) 0.5 MG tablet, TAKE 1 TABLET BY MOUTH EVERY 6 HOURS AS NEEDED FOR ANXIETY, Disp: 10 tablet, Rfl: 0     ondansetron (ZOFRAN-ODT) 8 MG ODT tab, Take 1 tablet (8 mg) by mouth every 8 hours as needed for nausea, Disp: 180 tablet, Rfl: 1     order for DME, Equipment  being ordered: Trilok brace 8 1/2 left lower extremity, Disp: 1 Device, Rfl: 0     order for DME, Equipment being ordered: size 8 1/2 walking boot tall, Disp: 1 Device, Rfl: 0     polyethylene glycol (MIRALAX/GLYCOLAX) powder, Take 1 capful by mouth 3 times daily, Disp: , Rfl:      sucralfate (CARAFATE) 1 GM/10ML suspension, Take 10 mLs (1 g) by mouth 4 times daily, Disp: 1200 mL, Rfl: 2     triamcinolone (KENALOG) 0.5 % external ointment, Apply 1 g topically 3 times daily as needed for irritation, Disp: 15 g, Rfl: 3    Current Facility-Administered Medications:      lidocaine 1 % injection 0.5 mL, 0.5 mL, , , Andres Johnson DPM, 0.5 mL at 09/15/20 1554     methylPREDNISolone (DEPO-MEDROL) injection 40 mg, 40 mg, , , Yeo, Albert, MD, 40 mg at 09/29/20 1154     triamcinolone (KENALOG-40) injection 40 mg, 40 mg, , , Andres Johnson DPM, 40 mg at 09/15/20 1554     Allergies   Allergen Reactions     Amoxil [Penicillins] Rash     Dad unsure of reaction.     Bee Venom Anaphylaxis     Bioflavonoids Anaphylaxis     Citrus Anaphylaxis     All [facility]     Contrast Dye Rash     Contrast Media Ready-[facility] [place], 04/09/2014.; Contrast [employer] [facility] [place], 04/09/2014.  NOTE: this is a contrast media oral with iodine. Premedicate with methylpred standard for IV contrast, request barium contrast for oral contrast.     Diagnostic X-Ray Materials Hives and Rash     Contrast [employer] [facility] [place], 04/09/2014.; Contrast [employer] [facility] [place], 04/09/2014.  NOTE: this is a contrast media oral with iodine. Premedicate with methylpred standard for IV contrast, request barium contrast for oral contrast.     Pineapple Anaphylaxis, Difficulty breathing and Rash     Reglan [Metoclopramide] Other (See Comments)     IV dose only, in ER, rapid heart rate.     Ace Inhibitors      Difficulty in breathing and GI upset     Amitiza [Lubiprostone] Nausea and Vomiting     Amoxicillin-Pot Clavulanate      Midazolam Unknown     parent states that  "when pt takes this medication, she wakes up being very violent .     No Clinical Screening - See Comments      Bleech/ chest tightness, itchy throat, swollen tongue, hives     Tizanidine Other (See Comments)     Confusion, back pain, photophobia, abdominal pain, shaking, anxious       Versed      Coming out of pelvic exam at age of 5, was kicking and screaming when coming out of the versed.     Adhesive Tape Rash     Azithromycin Hives and Rash     Cephalexin Itching and Rash        PHYSICAL EXAM:  Pt reports \"small\" headache today, which has been present for 3 days, rates 4/10.      HPI:  Patient reports the following new medical problems since last visit: ED visit for accidental overdose in the setting of medication changes.     Patient reports good benefit from last series of Botox injections, which only started to wear off about 1 month ago. Noting more tightness in the neck muscles.    We reviewed the recommended safety guidelines for  Botox from any vaccine injection, such as the seasonal flu vaccine, by a minimum of 10-14 days with Samara Oropeza. She acknowledged understanding.      RESPONSE TO PREVIOUS TREATMENT:  Change in headache pattern following last series of injections with 185 units of  Botox on 7/28/2020.     Post-procedural headache: Rated as 'Mild' severity.  Duration: 3-4 days then completely resolved    1.  Headache frequency during this injection cycle:  2 headaches per month. 1 month ago as Botox was wearing off she noticed increased headaches occurring every couple of days. This is compared to her baseline headache frequency of 30 headache days per month. Significantly less headaches this injection cycle compared to previous.     2.  Headache duration during this injection cycle:  Headache duration ranged from 2 hours to 6 hours.   Patient reports one episodes of multiple day headaches during this injection cycle.    3.  Headache intensity during this injection cycle:    A.  " 6/10  =  Typical pain level.    B.  9/10  =  Worst pain level.   C.  2/10  =  Lowest pain level.    4.  Change in headache medication usage during this injection cycle:  (For Example:  Able to decrease use of oral pain medications.) Patient reports that she did not utilize any migraine pain medication during this injection cycle, other than Extra Strength Tylenol during her recent 3 day migraine.     5.  ER Visits During This Injection Cycle:  None due to migraine headache.     6.  Functional Performance:  Change in ADL's, social interaction, days lost from work, etc. 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       BOTULINUM NEUROTOXIN INJECTION PROCEDURES:    VERIFICATION OF PATIENT IDENTIFICATION AND PROCEDURE     Initials   Patient Name jmb   Patient  jmb   Procedure Verified by: cali     Prior to the start of the procedure and with procedural staff participation, I verbally confirmed the patient s identity using two indicators, relevant allergies, that the procedure was appropriate and matched the consent or emergent situation, and that the correct equipment/implants were available. Immediately prior to starting the procedure I conducted the Time Out with the procedural staff and re-confirmed the patient s name, procedure, and site/side. (The Joint Commission universal protocol was followed.)  Yes    Sedation (Moderate or Deep): None    Above assessments performed by:  Resident MARY CARMEN Marcelino          The attending provider was present for the entire procedure documented below.    Alejandrina Hernandez MD     INDICATIONS FOR PROCEDURES:  Samara Oropeza is a 25 year old patient with a complex medical history that includes chronic migraine headaches associated with cervicogenic components.      Her baseline symptoms have been recalcitrant to oral medications and conservative therapy.  She is here today for re-evaluation and it was decided to reinject with Botox.     GOAL OF  PROCEDURE:  The goal of this procedure is to decrease pain.    TOTAL DOSE: 200 UNITS BOTOX  Dose Administered:  200 units  Botox (Botulinum Toxin Type A)       2:1 Dilution   Diluent Used:  0.25% Sensorcaine  (NDC: 58768-487-78,  Lot: [id number],  Exp: 6/2023)  Total Volume of Diluent Used:  4 ml  Lot # /C3 with Expiration Date:  6/2023  NDC #: Botox 100u ([address]-39)     Medication guide was offered to patient and was declined.    CONSENT:  The risks, benefits, and treatment options were discussed with Samara Robersonon and she agreed to proceed.    Written consent was obtained by cali.     EQUIPMENT USED:  Needle-30 gauge    SKIN PREPARATION:  Skin preparation was performed using an alcohol wipe.     GUIDANCE DESCRIPTION:  Electro-myographic guidance was necessary throughout the shoulder and neck muscles to accurately identify all areas of dystonic muscles while avoiding injection of non-dystonic muscles and non-spastic muscles, neighboring nerves and nearby vascular structures.     AREA/MUSCLE INJECTED:  200 UNITS BOTOX = TOTAL DOSE     1 & 2. SHOULDER GIRDLE & NECK MUSCLES: 50 units Botox = Total Dose, 2:1 Dilution      Right Splenius Cervicis - 5 units of Botox at 1 site/s.   Left Splenius Cervicis - 5 units of Botox at 1 site/s.     Right Rectus Capitis - 2.5 units of Botox at 1 site.  Left Rectus Capitis - 2.5 units of Botox at 1site.     Right Lateral Trapezius - 10 units of Botox at 4 sites/s.  Left Lateral Trapezius - 10 units of Botox at 4 site/s.      Right Levator Scapulae - 7.5 units of Botox at 2 site/s.   Left Levator Scapulae - 7.5 units of Botox at 2 site/s.       3. HEAD & SCALP MUSCLES: 150 units Botox = Total Dose, 2:1 Dilution     Right Occipitalis - 5 units of Botox at 3 site/s.   Left Occipitalis - 5 units of Botox at 3 site/s.     Right Frontalis - 15 units of Botox at 4 site/s.  Left Frontalis - 15 units of Botox at 4 site/s.     Right Temporalis - 47.5 units of Botox at  8 site/s.  Left Temporalis - 47.5 units of Botox at 8 site/s.     Right  - 5 units of Botox at 1 site/s.              Left  - 5 units of Botox at 1 site/s.                 Procerus - 5 units of Botox at 1 site/s.      RESPONSE TO PROCEDURE:  Samara Oropeza tolerated the procedure well and there were no immediate complications.   She was allowed to recover for an appropriate period of time and was discharged home in stable condition.    FOLLOW UP:  Samara Oropeza was asked to follow up by phone in 7-14 days with Sharmila Lynn PT, Care Coordinator or Vidya Dickinson RN, Care Coordinator, to report her response to this series of injections.  Based on the patient's previous response to this therapy, Samara Oropeza was rescheduled for the next series of injections in 12 weeks.    PLAN (Medication Changes, Therapy Orders, Work or Disability Issues, etc.): Dose was increased to 200 units with additional Botox delivered to the shoulder and neck muscles.  Patient will continue to monitor response to today's injections.         Again, thank you for allowing me to participate in the care of your patient.      Sincerely,    Alejandrina Hernandez MD

## 2020-10-20 NOTE — LETTER
10/20/2020       RE: Samara Oropeza  [address]  [address]  [facility] [place] [address]     Dear Colleague,    Thank you for referring your patient, Samara Oropeza, to the [facility] [place] at [facility]. Please see a copy of my visit note below.    BOTULINUM TOXIN PROCEDURE - HEADACHE - NOTE    No chief complaint on file.    There were no vitals taken for this visit.     Current Outpatient Medications:      albuterol (PROAIR HFA/PROVENTIL HFA/VENTOLIN HFA) 108 (90 Base) MCG/ACT inhaler, Inhale 2 puffs into the lungs every 6 hours as needed for shortness of breath / dyspnea or wheezing, Disp: 1 Inhaler, Rfl: 1     amitriptyline (ELAVIL) 25 MG tablet, TAKE 1 TABLET BY MOUTH EVERYDAY AT BEDTIME, Disp: 90 tablet, Rfl: 1     Apremilast (OTEZLA) 10 & 20 & 30 MG TBPK, Take by mouth according to the instructions on the packet. Hold for signs of infection,any GI upset, weight loss or depression concerns, and seek medical attention., Disp: 1 each, Rfl: 0     apremilast (OTEZLA) 30 MG tablet, Take 1 tablet (30 mg) by mouth 2 times daily Hold for signs of infection, and seek medical attention., Disp: 180 tablet, Rfl: 3     artificial tears OINT ophthalmic ointment, 0.5 inch strip each eye at night, Disp: 1 Tube, Rfl: 11     benzocaine (TOPICALE XTRA) 20 % GEL, Apply as needed locally to mouth or nasal ulcers for pain; 4 times daily as needed, Disp: 30 g, Rfl: 1     betamethasone valerate (VALISONE) 0.1 % cream, Apply topically 2 times daily as needed , Disp: , Rfl:      bisacodyl (DULCOLAX) 5 MG EC tablet, Take 2 tablets (10 mg) by mouth daily as needed for constipation, Disp: 30 tablet, Rfl: 0     citalopram (CELEXA) 20 MG tablet, Take 1 tablet (20 mg) by mouth daily, Disp: 90 tablet, Rfl: 1     colchicine (COLCYRS) 0.6 MG tablet, TAKE 1 TABLET (0.6 MG) BY MOUTH 2 TIMES DAILY, Disp: 180 tablet, Rfl: 1      cyproheptadine (PERIACTIN) 4 MG tablet, TAKE 2 TABLETS (8 MG) BY MOUTH AT BEDTIME FOR NIGHTMARES, Disp: 180 tablet, Rfl: 1     diclofenac (VOLTAREN) 75 MG EC tablet, Take 1 tablet (75 mg) by mouth 2 times daily as needed for moderate pain, Disp: 60 tablet, Rfl: 1     dicyclomine (BENTYL) 20 MG tablet, Take 1 tablet (20 mg) by mouth 4 times daily as needed, Disp: 120 tablet, Rfl: 3     diphenhydrAMINE (BENADRYL) 25 MG tablet, Take 1 tablet (25 mg) by mouth 3 times daily as needed (itching), Disp: 40 tablet, Rfl: 1     EPINEPHrine (EPIPEN 2-EAMON) 0.3 MG/0.3ML injection, Inject 0.3 mLs (0.3 mg) into the muscle as needed for anaphylaxis, Disp: 0.6 mL, Rfl: 3     furosemide (LASIX) 20 MG tablet, Take 1 tablet (20 mg) by mouth 3 times daily, Disp: 60 tablet, Rfl: 3     gabapentin (NEURONTIN) 300 MG capsule, TAKE 1 CAPSULE BY MOUTH THREE TIMES DAILY, Disp: 90 capsule, Rfl: 1     guanFACINE (TENEX) 2 MG tablet, Take one and one half tabs in the morning and one and one half in the evening., Disp: 270 tablet, Rfl: 3     hypromellose (GENTEAL) 0.3 % SOLN, 1 drop every hour as needed for dry eyes , Disp: , Rfl:      Lactase 9000 units CHEW, Take 1 tablet by mouth daily, Disp: 90 tablet, Rfl: 1     lactulose 20 GM/30ML SOLN, Take 30 mLs by mouth 3 times daily as needed (for constipation), Disp: 300 mL, Rfl: 3     lidocaine (LMX4) 4 % external cream, Apply topically once as needed for mild pain, Disp: 120 g, Rfl: 1     lidocaine (LMX4) 4 % external cream, Apply 1 Application topically once as needed for mild pain, Disp: , Rfl:      LINZESS 290 MCG capsule, TAKE 1 CAPSULE BY MOUTH EVERY DAY IN THE MORNING BEFORE BREAKFAST, Disp: 90 capsule, Rfl: 0     LORazepam (ATIVAN) 0.5 MG tablet, TAKE 1 TABLET BY MOUTH EVERY 6 HOURS AS NEEDED FOR ANXIETY, Disp: 10 tablet, Rfl: 0     ondansetron (ZOFRAN-ODT) 8 MG ODT tab, Take 1 tablet (8 mg) by mouth every 8 hours as needed for nausea, Disp: 180 tablet, Rfl: 1     order for DME, Equipment  being ordered: Trilok brace 8 1/2 left lower extremity, Disp: 1 Device, Rfl: 0     order for DME, Equipment being ordered: size 8 1/2 walking boot tall, Disp: 1 Device, Rfl: 0     polyethylene glycol (MIRALAX/GLYCOLAX) powder, Take 1 capful by mouth 3 times daily, Disp: , Rfl:      sucralfate (CARAFATE) 1 GM/10ML suspension, Take 10 mLs (1 g) by mouth 4 times daily, Disp: 1200 mL, Rfl: 2     triamcinolone (KENALOG) 0.5 % external ointment, Apply 1 g topically 3 times daily as needed for irritation, Disp: 15 g, Rfl: 3    Current Facility-Administered Medications:      lidocaine 1 % injection 0.5 mL, 0.5 mL, , , Andres Johnson DPM, 0.5 mL at 09/15/20 1554     methylPREDNISolone (DEPO-MEDROL) injection 40 mg, 40 mg, , , Yeo, Albert, MD, 40 mg at 09/29/20 1154     triamcinolone (KENALOG-40) injection 40 mg, 40 mg, , , Andres Johnson DPM, 40 mg at 09/15/20 1554     Allergies   Allergen Reactions     Amoxil [Penicillins] Rash     Dad unsure of reaction.     Bee Venom Anaphylaxis     Bioflavonoids Anaphylaxis     Citrus Anaphylaxis     All [place]     Contrast Dye Rash     Contrast Media Ready-Box [facility], 04/09/2014.; Contrast Media Ready-Box [facility], 04/09/2014.  NOTE: this is a contrast media oral with iodine. Premedicate with methylpred standard for IV contrast, request barium contrast for oral contrast.     Diagnostic X-Ray Materials Hives and Rash     Contrast Media Ready-Box [facility], 04/09/2014.; Contrast Media Ready-Box [facility], 04/09/2014.  NOTE: this is a contrast media oral with iodine. Premedicate with methylpred standard for IV contrast, request barium contrast for oral contrast.     Pineapple Anaphylaxis, Difficulty breathing and Rash     Reglan [Metoclopramide] Other (See Comments)     IV dose only, in ER, rapid heart rate.     Ace Inhibitors      Difficulty in breathing and GI upset     Amitiza [Lubiprostone] Nausea and Vomiting     Amoxicillin-Pot Clavulanate      Midazolam Unknown     parent states that  "when pt takes this medication, she wakes up being very violent .     No Clinical Screening - See Comments      Bleech/ chest tightness, itchy throat, swollen tongue, hives     Tizanidine Other (See Comments)     Confusion, back pain, photophobia, abdominal pain, shaking, anxious       Versed      Coming out of pelvic exam at age of 5, was kicking and screaming when coming out of the versed.     Adhesive Tape Rash     Azithromycin Hives and Rash     Cephalexin Itching and Rash        PHYSICAL EXAM:  Pt reports \"small\" headache today, which has been present for 3 days, rates 4/10.      HPI:  Patient reports the following new medical problems since last visit: ED visit for accidental overdose in the setting of medication changes.     Patient reports good benefit from last series of Botox injections, which only started to wear off about 1 month ago. Noting more tightness in the neck muscles.    We reviewed the recommended safety guidelines for  Botox from any vaccine injection, such as the seasonal flu vaccine, by a minimum of 10-14 days with Samara Oropeza. She acknowledged understanding.      RESPONSE TO PREVIOUS TREATMENT:  Change in headache pattern following last series of injections with 185 units of  Botox on 7/28/2020.     Post-procedural headache: Rated as 'Mild' severity.  Duration: 3-4 days then completely resolved    1.  Headache frequency during this injection cycle:  2 headaches per month. 1 month ago as Botox was wearing off she noticed increased headaches occurring every couple of days. This is compared to her baseline headache frequency of 30 headache days per month. Significantly less headaches this injection cycle compared to previous.     2.  Headache duration during this injection cycle:  Headache duration ranged from 2 hours to 6 hours.   Patient reports one episodes of multiple day headaches during this injection cycle.    3.  Headache intensity during this injection cycle:    A.  " 6/10  =  Typical pain level.    B.  9/10  =  Worst pain level.   C.  2/10  =  Lowest pain level.    4.  Change in headache medication usage during this injection cycle:  (For Example:  Able to decrease use of oral pain medications.) Patient reports that she did not utilize any migraine pain medication during this injection cycle, other than Extra Strength Tylenol during her recent 3 day migraine.     5.  ER Visits During This Injection Cycle:  None due to migraine headache.     6.  Functional Performance:  Change in ADL's, social interaction, days lost from work, etc. 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       BOTULINUM NEUROTOXIN INJECTION PROCEDURES:    VERIFICATION OF PATIENT IDENTIFICATION AND PROCEDURE     Initials   Patient Name jmb   Patient  jmb   Procedure Verified by: cali     Prior to the start of the procedure and with procedural staff participation, I verbally confirmed the patient s identity using two indicators, relevant allergies, that the procedure was appropriate and matched the consent or emergent situation, and that the correct equipment/implants were available. Immediately prior to starting the procedure I conducted the Time Out with the procedural staff and re-confirmed the patient s name, procedure, and site/side. (The Joint Commission universal protocol was followed.)  Yes    Sedation (Moderate or Deep): None    Above assessments performed by:  Resident MARY CARMEN Marcelino          The attending provider was present for the entire procedure documented below.    Alejandrina Hernandez MD     INDICATIONS FOR PROCEDURES:  Samara Oropeza is a 25 year old patient with a complex medical history that includes chronic migraine headaches associated with cervicogenic components.      Her baseline symptoms have been recalcitrant to oral medications and conservative therapy.  She is here today for re-evaluation and it was decided to reinject with Botox.     GOAL OF  PROCEDURE:  The goal of this procedure is to decrease pain.    TOTAL DOSE: 200 UNITS BOTOX  Dose Administered:  200 units  Botox (Botulinum Toxin Type A)       2:1 Dilution   Diluent Used:  0.25% Sensorcaine  (NDC: 23246-377-08,  Lot: [id number],  Exp: 6/2023)  Total Volume of Diluent Used:  4 ml  Lot # /C3 with Expiration Date:  6/2023  NDC #: Botox 100u ([address]-15)     Medication guide was offered to patient and was declined.    CONSENT:  The risks, benefits, and treatment options were discussed with Samara Robersonon and she agreed to proceed.    Written consent was obtained by cali.     EQUIPMENT USED:  Needle-30 gauge    SKIN PREPARATION:  Skin preparation was performed using an alcohol wipe.     GUIDANCE DESCRIPTION:  Electro-myographic guidance was necessary throughout the shoulder and neck muscles to accurately identify all areas of dystonic muscles while avoiding injection of non-dystonic muscles and non-spastic muscles, neighboring nerves and nearby vascular structures.     AREA/MUSCLE INJECTED:  200 UNITS BOTOX = TOTAL DOSE     1 & 2. SHOULDER GIRDLE & NECK MUSCLES: 50 units Botox = Total Dose, 2:1 Dilution      Right Splenius Cervicis - 5 units of Botox at 1 site/s.   Left Splenius Cervicis - 5 units of Botox at 1 site/s.     Right Rectus Capitis - 2.5 units of Botox at 1 site.  Left Rectus Capitis - 2.5 units of Botox at 1site.     Right Lateral Trapezius - 10 units of Botox at 4 sites/s.  Left Lateral Trapezius - 10 units of Botox at 4 site/s.      Right Levator Scapulae - 7.5 units of Botox at 2 site/s.   Left Levator Scapulae - 7.5 units of Botox at 2 site/s.       3. HEAD & SCALP MUSCLES: 150 units Botox = Total Dose, 2:1 Dilution     Right Occipitalis - 5 units of Botox at 3 site/s.   Left Occipitalis - 5 units of Botox at 3 site/s.     Right Frontalis - 15 units of Botox at 4 site/s.  Left Frontalis - 15 units of Botox at 4 site/s.     Right Temporalis - 47.5 units of Botox at  8 site/s.  Left Temporalis - 47.5 units of Botox at 8 site/s.     Right  - 5 units of Botox at 1 site/s.              Left  - 5 units of Botox at 1 site/s.                 Procerus - 5 units of Botox at 1 site/s.      RESPONSE TO PROCEDURE:  Samara Oropeza tolerated the procedure well and there were no immediate complications.   She was allowed to recover for an appropriate period of time and was discharged home in stable condition.    FOLLOW UP:  Samara Oropeza was asked to follow up by phone in 7-14 days with Sharmila Lynn PT, Care Coordinator or Vidya Dickinson RN, Care Coordinator, to report her response to this series of injections.  Based on the patient's previous response to this therapy, Samara Oropeza was rescheduled for the next series of injections in 12 weeks.    PLAN (Medication Changes, Therapy Orders, Work or Disability Issues, etc.): Dose was increased to 200 units with additional Botox delivered to the shoulder and neck muscles.  Patient will continue to monitor response to today's injections.     Again, thank you for allowing me to participate in the care of your patient.  Sincerely,    Alejandrina Hernandez MD

## 2020-10-23 NOTE — PROGRESS NOTES
Please see if she would like to follow up for her spells with me or GN per her recent rheumatologist note. She saw me 5 years ago. Thank you

## 2020-10-28 ENCOUNTER — TRANSFERRED RECORDS (OUTPATIENT)
Dept: HEALTH INFORMATION MANAGEMENT | Facility: CLINIC | Age: 25
End: 2020-10-28

## 2020-11-17 DIAGNOSIS — L29.9 PRURITUS: ICD-10-CM

## 2020-11-17 DIAGNOSIS — M79.7 FIBROMYALGIA: ICD-10-CM

## 2020-11-18 NOTE — TELEPHONE ENCOUNTER
Pt requesting refill   Pending Prescriptions:                       Disp   Refills    amitriptyline (ELAVIL) 25 MG tablet [Phar*90 tab*1            Sig: TAKE 1 TABLET BY MOUTH EVERY NIGHT AT BEDTIME     Last visit 7/27/20  Last refill  5/21/20     Routing refill request to provider for review/approval because:  Drug not on the [facility] refill protocol   Off label use.       Elsy Hardin RN   [facility]      PHQ9 sent via [employer].

## 2020-11-24 NOTE — TELEPHONE ENCOUNTER
Samara Stearns's mother is calling Dr. Coburn on her behalf.  She reports Samara had a colonosocpy / Endoscopy last week.  Since last night has had severe pain in abdomen and up into her chest plus diarrhea.  Fever Friday and today 101. No appetite.  Has appointment with Dr. Coburn 1/29.    I spoke with Dr. Almas Lim's clinic nurse.  She advised ED evaluation here, incase symptoms related to procedure last week.    This information given to Jinny, she verbalizes understanding and agreement with the plan.  
[FreeTextEntry1] : Assessment is normal visit for followup of the results. Patient will follow up with cardiology and her endocrinologist.

## 2020-11-29 ENCOUNTER — HEALTH MAINTENANCE LETTER (OUTPATIENT)
Age: 25
End: 2020-11-29

## 2021-01-13 ENCOUNTER — OFFICE VISIT (OUTPATIENT)
Dept: PHYSICAL MEDICINE AND REHAB | Facility: CLINIC | Age: 26
End: 2021-01-13
Payer: COMMERCIAL

## 2021-01-13 VITALS
RESPIRATION RATE: 16 BRPM | DIASTOLIC BLOOD PRESSURE: 80 MMHG | TEMPERATURE: 98.8 F | HEART RATE: 79 BPM | OXYGEN SATURATION: 95 % | SYSTOLIC BLOOD PRESSURE: 109 MMHG

## 2021-01-13 DIAGNOSIS — G43.719 CHRONIC MIGRAINE WITHOUT AURA, INTRACTABLE, WITHOUT STATUS MIGRAINOSUS: Primary | ICD-10-CM

## 2021-01-13 PROCEDURE — 64615 CHEMODENERV MUSC MIGRAINE: CPT | Performed by: PHYSICAL MEDICINE & REHABILITATION

## 2021-01-13 RX ORDER — BUPIVACAINE HYDROCHLORIDE 2.5 MG/ML
4 INJECTION, SOLUTION EPIDURAL; INFILTRATION; INTRACAUDAL ONCE
Status: COMPLETED | OUTPATIENT
Start: 2021-01-13 | End: 2021-01-13

## 2021-01-13 RX ADMIN — BUPIVACAINE HYDROCHLORIDE 10 MG: 2.5 INJECTION, SOLUTION EPIDURAL; INFILTRATION; INTRACAUDAL at 12:26

## 2021-01-13 NOTE — LETTER
1/13/2021       RE: Samara Oropeza  [address]  [address]  [facility] [address]     Dear Colleague,    Thank you for referring your patient, Samara Oropeza, to the [facility] [place] at [facility]. Please see a copy of my visit note below.    BOTULINUM TOXIN PROCEDURE - HEADACHE - NOTE    Chief Complaint   Patient presents with     Botox     UMP Return Botox     Headache     Dystonia     Cervical     /80   Pulse 79   Temp 98.8  F (37.1  C)   Resp 16   SpO2 95%      Current Outpatient Medications:      albuterol (PROAIR HFA/PROVENTIL HFA/VENTOLIN HFA) 108 (90 Base) MCG/ACT inhaler, Inhale 2 puffs into the lungs every 6 hours as needed for shortness of breath / dyspnea or wheezing, Disp: 1 Inhaler, Rfl: 1     amitriptyline (ELAVIL) 25 MG tablet, TAKE 1 TABLET BY MOUTH EVERY NIGHT AT BEDTIME, Disp: 90 tablet, Rfl: 1     Apremilast (OTEZLA) 10 & 20 & 30 MG TBPK, Take by mouth according to the instructions on the packet. Hold for signs of infection,any GI upset, weight loss or depression concerns, and seek medical attention., Disp: 1 each, Rfl: 0     apremilast (OTEZLA) 30 MG tablet, Take 1 tablet (30 mg) by mouth 2 times daily Hold for signs of infection, and seek medical attention., Disp: 180 tablet, Rfl: 3     artificial tears OINT ophthalmic ointment, 0.5 inch strip each eye at night, Disp: 1 Tube, Rfl: 11     benzocaine (TOPICALE XTRA) 20 % GEL, Apply as needed locally to mouth or nasal ulcers for pain; 4 times daily as needed, Disp: 30 g, Rfl: 1     betamethasone valerate (VALISONE) 0.1 % cream, Apply topically 2 times daily as needed , Disp: , Rfl:      bisacodyl (DULCOLAX) 5 MG EC tablet, Take 2 tablets (10 mg) by mouth daily as needed for constipation, Disp: 30 tablet, Rfl: 0     citalopram (CELEXA) 20 MG tablet, Take 1 tablet (20 mg) by mouth daily, Disp: 90 tablet, Rfl: 1      colchicine (COLCYRS) 0.6 MG tablet, TAKE 1 TABLET (0.6 MG) BY MOUTH 2 TIMES DAILY, Disp: 180 tablet, Rfl: 1     cyproheptadine (PERIACTIN) 4 MG tablet, TAKE 2 TABLETS (8 MG) BY MOUTH AT BEDTIME FOR NIGHTMARES, Disp: 180 tablet, Rfl: 1     diclofenac (VOLTAREN) 75 MG EC tablet, Take 1 tablet (75 mg) by mouth 2 times daily as needed for moderate pain, Disp: 60 tablet, Rfl: 1     dicyclomine (BENTYL) 20 MG tablet, Take 1 tablet (20 mg) by mouth 4 times daily as needed, Disp: 120 tablet, Rfl: 3     diphenhydrAMINE (BENADRYL) 25 MG tablet, Take 1 tablet (25 mg) by mouth 3 times daily as needed (itching), Disp: 40 tablet, Rfl: 1     EPINEPHrine (EPIPEN 2-EAMON) 0.3 MG/0.3ML injection, Inject 0.3 mLs (0.3 mg) into the muscle as needed for anaphylaxis, Disp: 0.6 mL, Rfl: 3     furosemide (LASIX) 20 MG tablet, Take 1 tablet (20 mg) by mouth 3 times daily, Disp: 60 tablet, Rfl: 3     gabapentin (NEURONTIN) 300 MG capsule, TAKE 1 CAPSULE BY MOUTH THREE TIMES DAILY, Disp: 90 capsule, Rfl: 1     guanFACINE (TENEX) 2 MG tablet, Take one and one half tabs in the morning and one and one half in the evening., Disp: 270 tablet, Rfl: 3     hypromellose (GENTEAL) 0.3 % SOLN, 1 drop every hour as needed for dry eyes , Disp: , Rfl:      Lactase 9000 units CHEW, Take 1 tablet by mouth daily, Disp: 90 tablet, Rfl: 1     lactulose 20 GM/30ML SOLN, Take 30 mLs by mouth 3 times daily as needed (for constipation), Disp: 300 mL, Rfl: 3     lidocaine (LMX4) 4 % external cream, Apply topically once as needed for mild pain, Disp: 120 g, Rfl: 1     lidocaine (LMX4) 4 % external cream, Apply 1 Application topically once as needed for mild pain, Disp: , Rfl:      LINZESS 290 MCG capsule, TAKE 1 CAPSULE BY MOUTH EVERY DAY IN THE MORNING BEFORE BREAKFAST, Disp: 90 capsule, Rfl: 0     LORazepam (ATIVAN) 0.5 MG tablet, TAKE 1 TABLET BY MOUTH EVERY 6 HOURS AS NEEDED FOR ANXIETY, Disp: 10 tablet, Rfl: 0     ondansetron (ZOFRAN-ODT) 8 MG ODT tab, Take 1  tablet (8 mg) by mouth every 8 hours as needed for nausea, Disp: 180 tablet, Rfl: 1     order for DME, Equipment being ordered: Trilok brace 8 1/2 left lower extremity, Disp: 1 Device, Rfl: 0     order for DME, Equipment being ordered: size 8 1/2 walking boot tall, Disp: 1 Device, Rfl: 0     polyethylene glycol (MIRALAX/GLYCOLAX) powder, Take 1 capful by mouth 3 times daily, Disp: , Rfl:      sucralfate (CARAFATE) 1 GM/10ML suspension, Take 10 mLs (1 g) by mouth 4 times daily, Disp: 1200 mL, Rfl: 2     triamcinolone (KENALOG) 0.5 % external ointment, Apply 1 g topically 3 times daily as needed for irritation, Disp: 15 g, Rfl: 3    Current Facility-Administered Medications:      botulinum toxin type A (BOTOX) 100 units injection 200 Units, 200 Units, Intramuscular, Q90 Days, StandalAlejandrina MD     lidocaine 1 % injection 0.5 mL, 0.5 mL, , , Andres Johnson DPM, 0.5 mL at 09/15/20 1554     methylPREDNISolone (DEPO-MEDROL) injection 40 mg, 40 mg, , , Yeo, Albert, MD, 40 mg at 09/29/20 1154     triamcinolone (KENALOG-40) injection 40 mg, 40 mg, , , Andres Johnson DPM, 40 mg at 09/15/20 1554     Allergies   Allergen Reactions     Amoxil [Penicillins] Rash     Dad unsure of reaction.     Bee Venom Anaphylaxis     Bioflavonoids Anaphylaxis     Citrus Anaphylaxis     All [place]     Contrast Dye Rash     Contrast Media Ready-[facility] 04/09/2014.; Contrast Media [facility], 04/09/2014.  NOTE: this is a contrast media oral with iodine. Premedicate with methylpred standard for IV contrast, request barium contrast for oral contrast.     Diagnostic X-Ray Materials Hives and Rash     Contrast [employer] [facility] 04/09/2014.; Contrast [employer] [facility], 04/09/2014.  NOTE: this is a contrast media oral with iodine. Premedicate with methylpred standard for IV contrast, request barium contrast for oral contrast.     Pineapple Anaphylaxis, Difficulty breathing and Rash     Reglan [Metoclopramide] Other  (See Comments)     IV dose only, in ER, rapid heart rate.     Ace Inhibitors      Difficulty in breathing and GI upset     Amitiza [Lubiprostone] Nausea and Vomiting     Amoxicillin-Pot Clavulanate      Midazolam Unknown     parent states that when pt takes this medication, she wakes up being very violent .     No Clinical Screening - See Comments      Bleech/ chest tightness, itchy throat, swollen tongue, hives     Tizanidine Other (See Comments)     Confusion, back pain, photophobia, abdominal pain, shaking, anxious       Versed      Coming out of pelvic exam at age of 5, was kicking and screaming when coming out of the versed.     Adhesive Tape Rash     Azithromycin Hives and Rash     Cephalexin Itching and Rash        PHYSICAL EXAM:  Pt reports headache today, which has been present for 3 days, rates 4/10.  Headache is located bilaterally behind head and radiates to eyes.    HPI:  Patient reports the following new medical problems since last visit: ED visit for accidental overdose in the setting of medication changes.  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     Patient reports much less shoulder tightness after previous injection due to increase in dose added to shoulder region, but headaches were overall worse this cycle.    We reviewed the recommended safety guidelines for  Botox from any vaccine injection, such as the seasonal flu vaccine, by a minimum of 10-14 days with Samara Oropeza. She acknowledged understanding.      RESPONSE TO PREVIOUS TREATMENT:  Change in headache pattern following last series of injections with 200 units of  Botox on 10/20/2020, which was an increase from previous dose of 185 units, delivered to shoulder and neck muscles.    Post-procedural headache: Rated as 'Mild' severity.  Duration: 2 days then completely resolved    1.  Headache  frequency during this injection cycle:  5 headaches in November (3 were migraines). In December 3 mild headaches, and currently 2 headaches this month.  This is compared to her baseline headache frequency of 30 headache days per month. Patient reports increased headaches this injection cycle compared to previous, possibly due to a change in medication.     2.  Headache duration during this injection cycle:  Headache duration ranged from 2 hours to 3 days   Patient reports 2 episodes of multiple day headaches during this injection cycle.    3.  Headache intensity during this injection cycle:    A.  4-5/10  =  Typical pain level.    B.  8/10  =  Worst pain level, with fever   C.  2/10  =  Lowest pain level.    4.  Change in headache medication usage during this injection cycle:  (For Example:  Able to decrease use of oral pain medications.) Patient reports taking less Excedrin Migraine medication this cycle.     5.  ER Visits During This Injection Cycle:  None due to migraine headache.     6.  Functional Performance:  Change in ADL's, social interaction, days lost from work, etc.  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     BOTULINUM NEUROTOXIN INJECTION PROCEDURES:    VERIFICATION OF PATIENT IDENTIFICATION AND PROCEDURE     Initials   Patient Name [facility]   Patient  [email or web address]   Procedure Verified by: cali     Prior to the start of the procedure and with procedural staff participation, I verbally confirmed the patient s identity using two indicators, relevant allergies, that the procedure was appropriate and matched the consent or emergent situation, and that the correct equipment/implants were available. Immediately prior to starting the procedure I conducted the Time Out with the procedural staff and re-confirmed the patient s name, procedure, and site/side. (The Joint Commission universal protocol was followed.)  Yes    Sedation  (Moderate or Deep): None    Above assessments performed by:  Sharmila Lynn, PT, Care Coordinator    Alejandrina Hernandez MD     INDICATIONS FOR PROCEDURES:  Samara Oropeza is a 25 year old patient with a complex medical history that includes chronic migraine headaches associated with cervicogenic components.      Her baseline symptoms have been recalcitrant to oral medications and conservative therapy.  She is here today for re-evaluation and it was decided to reinject with Botox.     GOAL OF PROCEDURE:  The goal of this procedure is to decrease pain.    TOTAL DOSE: 200 UNITS BOTOX  Dose Administered:  200 units  Botox (Botulinum Toxin Type A)       2:1 Dilution   Diluent Used:  0.25% Sensorcaine  (NDC: 55150-167-10,  Batch: [id number],  Exp: 8/2022)  Total Volume of Diluent Used:  4 ml  Lot # /C3 with Expiration Date:  6/2023  NDC #: Botox 100u ([address]-29)     Medication guide was offered to patient and was declined.    CONSENT:  The risks, benefits, and treatment options were discussed with Samara Oropeza and she agreed to proceed.    Written consent was obtained by [facility].     EQUIPMENT USED:  Needle-30 gauge  25-mm EMG needle  EMG    SKIN PREPARATION:  Skin preparation was performed using an alcohol wipe.     GUIDANCE DESCRIPTION:  Electro-myographic guidance was necessary throughout the shoulder and neck muscles to accurately identify all areas of dystonic muscles while avoiding injection of non-dystonic muscles and non-spastic muscles, neighboring nerves and nearby vascular structures.     AREA/MUSCLE INJECTED:  200 UNITS BOTOX = TOTAL DOSE     1 & 2. SHOULDER GIRDLE & NECK MUSCLES: 50 units Botox = Total Dose, 2:1 Dilution      Right Splenius Cervicis - 5 units of Botox at 1 site/s.   Left Splenius Cervicis - 5 units of Botox at 1 site/s.     Right Rectus Capitis - 2.5 units of Botox at 1 site.  Left Rectus Capitis - 2.5 units of Botox at 1site.     Right Lateral Trapezius - 10 units of Botox  at 4 sites/s.  Left Lateral Trapezius - 10 units of Botox at 4 site/s.      Right Levator Scapulae - 7.5 units of Botox at 2 site/s.   Left Levator Scapulae - 7.5 units of Botox at 2 site/s.       3. HEAD & SCALP MUSCLES: 150 units Botox = Total Dose, 2:1 Dilution     Right Occipitalis - 5 units of Botox at 3 site/s.   Left Occipitalis - 5 units of Botox at 3 site/s.     Right Frontalis - 15 units of Botox at 4 site/s.  Left Frontalis - 15 units of Botox at 4 site/s.     Right Temporalis - 47.5 units of Botox at 8 site/s.  Left Temporalis - 47.5 units of Botox at 8 site/s.     Right  - 5 units of Botox at 1 site/s.              Left  - 5 units of Botox at 1 site/s.                 Procerus - 5 units of Botox at 1 site/s.      RESPONSE TO PROCEDURE:  Samara Oropeza tolerated the procedure well and there were no immediate complications.   She was allowed to recover for an appropriate period of time and was discharged home in stable condition.    FOLLOW UP:  Samara Oropeza was asked to follow up by phone in 7-14 days with Sharmila Lynn PT, Care Coordinator or Vidya Dickinson RN, Care Coordinator, to report her response to this series of injections.  Based on the patient's previous response to this therapy, Samara Oropeza was rescheduled for the next series of injections in 12 weeks.    PLAN (Medication Changes, Therapy Orders, Work or Disability Issues, etc.):   Patient will continue to monitor response to today's injections.       Again, thank you for allowing me to participate in the care of your patient.      Sincerely,    Alejandrina Hernandez MD

## 2021-01-13 NOTE — PROGRESS NOTES
BOTULINUM TOXIN PROCEDURE - HEADACHE - NOTE    Chief Complaint   Patient presents with     Botox     [facility] Return Botox     Headache     Dystonia     Cervical     /80   Pulse 79   Temp 98.8  F (37.1  C)   Resp 16   SpO2 95%      Current Outpatient Medications:      albuterol (PROAIR HFA/PROVENTIL HFA/VENTOLIN HFA) 108 (90 Base) MCG/ACT inhaler, Inhale 2 puffs into the lungs every 6 hours as needed for shortness of breath / dyspnea or wheezing, Disp: 1 Inhaler, Rfl: 1     amitriptyline (ELAVIL) 25 MG tablet, TAKE 1 TABLET BY MOUTH EVERY NIGHT AT BEDTIME, Disp: 90 tablet, Rfl: 1     Apremilast (OTEZLA) 10 & 20 & 30 MG TBPK, Take by mouth according to the instructions on the packet. Hold for signs of infection,any GI upset, weight loss or depression concerns, and seek medical attention., Disp: 1 each, Rfl: 0     apremilast (OTEZLA) 30 MG tablet, Take 1 tablet (30 mg) by mouth 2 times daily Hold for signs of infection, and seek medical attention., Disp: 180 tablet, Rfl: 3     artificial tears OINT ophthalmic ointment, 0.5 inch strip each eye at night, Disp: 1 Tube, Rfl: 11     benzocaine (TOPICALE XTRA) 20 % GEL, Apply as needed locally to mouth or nasal ulcers for pain; 4 times daily as needed, Disp: 30 g, Rfl: 1     betamethasone valerate (VALISONE) 0.1 % cream, Apply topically 2 times daily as needed , Disp: , Rfl:      bisacodyl (DULCOLAX) 5 MG EC tablet, Take 2 tablets (10 mg) by mouth daily as needed for constipation, Disp: 30 tablet, Rfl: 0     citalopram (CELEXA) 20 MG tablet, Take 1 tablet (20 mg) by mouth daily, Disp: 90 tablet, Rfl: 1     colchicine (COLCYRS) 0.6 MG tablet, TAKE 1 TABLET (0.6 MG) BY MOUTH 2 TIMES DAILY, Disp: 180 tablet, Rfl: 1     cyproheptadine (PERIACTIN) 4 MG tablet, TAKE 2 TABLETS (8 MG) BY MOUTH AT BEDTIME FOR NIGHTMARES, Disp: 180 tablet, Rfl: 1     diclofenac (VOLTAREN) 75 MG EC tablet, Take 1 tablet (75 mg) by mouth 2 times daily as needed for moderate pain, Disp: 60 tablet,  Rfl: 1     dicyclomine (BENTYL) 20 MG tablet, Take 1 tablet (20 mg) by mouth 4 times daily as needed, Disp: 120 tablet, Rfl: 3     diphenhydrAMINE (BENADRYL) 25 MG tablet, Take 1 tablet (25 mg) by mouth 3 times daily as needed (itching), Disp: 40 tablet, Rfl: 1     EPINEPHrine (EPIPEN 2-EAMON) 0.3 MG/0.3ML injection, Inject 0.3 mLs (0.3 mg) into the muscle as needed for anaphylaxis, Disp: 0.6 mL, Rfl: 3     furosemide (LASIX) 20 MG tablet, Take 1 tablet (20 mg) by mouth 3 times daily, Disp: 60 tablet, Rfl: 3     gabapentin (NEURONTIN) 300 MG capsule, TAKE 1 CAPSULE BY MOUTH THREE TIMES DAILY, Disp: 90 capsule, Rfl: 1     guanFACINE (TENEX) 2 MG tablet, Take one and one half tabs in the morning and one and one half in the evening., Disp: 270 tablet, Rfl: 3     hypromellose (GENTEAL) 0.3 % SOLN, 1 drop every hour as needed for dry eyes , Disp: , Rfl:      Lactase 9000 units CHEW, Take 1 tablet by mouth daily, Disp: 90 tablet, Rfl: 1     lactulose 20 GM/30ML SOLN, Take 30 mLs by mouth 3 times daily as needed (for constipation), Disp: 300 mL, Rfl: 3     lidocaine (LMX4) 4 % external cream, Apply topically once as needed for mild pain, Disp: 120 g, Rfl: 1     lidocaine (LMX4) 4 % external cream, Apply 1 Application topically once as needed for mild pain, Disp: , Rfl:      LINZESS 290 MCG capsule, TAKE 1 CAPSULE BY MOUTH EVERY DAY IN THE MORNING BEFORE BREAKFAST, Disp: 90 capsule, Rfl: 0     LORazepam (ATIVAN) 0.5 MG tablet, TAKE 1 TABLET BY MOUTH EVERY 6 HOURS AS NEEDED FOR ANXIETY, Disp: 10 tablet, Rfl: 0     ondansetron (ZOFRAN-ODT) 8 MG ODT tab, Take 1 tablet (8 mg) by mouth every 8 hours as needed for nausea, Disp: 180 tablet, Rfl: 1     order for DME, Equipment being ordered: GiftRocket brace 8 1/2 left lower extremity, Disp: 1 Device, Rfl: 0     order for DME, Equipment being ordered: size 8 1/2 walking boot tall, Disp: 1 Device, Rfl: 0     polyethylene glycol (MIRALAX/GLYCOLAX) powder, Take 1 capful by mouth 3 times  daily, Disp: , Rfl:      sucralfate (CARAFATE) 1 GM/10ML suspension, Take 10 mLs (1 g) by mouth 4 times daily, Disp: 1200 mL, Rfl: 2     triamcinolone (KENALOG) 0.5 % external ointment, Apply 1 g topically 3 times daily as needed for irritation, Disp: 15 g, Rfl: 3    Current Facility-Administered Medications:      botulinum toxin type A (BOTOX) 100 units injection 200 Units, 200 Units, Intramuscular, Q90 Days, Alejandrina Hernandez MD     lidocaine 1 % injection 0.5 mL, 0.5 mL, , , Andres Johnson, DPM, 0.5 mL at 09/15/20 1554     methylPREDNISolone (DEPO-MEDROL) injection 40 mg, 40 mg, , , Yeo, Albert, MD, 40 mg at 09/29/20 1154     triamcinolone (KENALOG-40) injection 40 mg, 40 mg, , , Andres Johnson DPM, 40 mg at 09/15/20 1554     Allergies   Allergen Reactions     Amoxil [Penicillins] Rash     Dad unsure of reaction.     Bee Venom Anaphylaxis     Bioflavonoids Anaphylaxis     Citrus Anaphylaxis     All Boundary     Contrast Dye Rash     Contrast Media Ready-Box [facility], 04/09/2014.; Contrast Media Ready-Box [facility], 04/09/2014.  NOTE: this is a contrast media oral with iodine. Premedicate with methylpred standard for IV contrast, request barium contrast for oral contrast.     Diagnostic X-Ray Materials Hives and Rash     Contrast Media Ready-Box [facility], 04/09/2014.; Contrast Media Ready-Box [facility], 04/09/2014.  NOTE: this is a contrast media oral with iodine. Premedicate with methylpred standard for IV contrast, request barium contrast for oral contrast.     Pineapple Anaphylaxis, Difficulty breathing and Rash     Reglan [Metoclopramide] Other (See Comments)     IV dose only, in ER, rapid heart rate.     Ace Inhibitors      Difficulty in breathing and GI upset     Amitiza [Lubiprostone] Nausea and Vomiting     Amoxicillin-Pot Clavulanate      Midazolam Unknown     parent states that when pt takes this medication, she wakes up being very violent .     No Clinical Screening - See Comments      Bleech/ chest  tightness, itchy throat, swollen tongue, hives     Tizanidine Other (See Comments)     Confusion, back pain, photophobia, abdominal pain, shaking, anxious       Versed      Coming out of pelvic exam at age of 5, was kicking and screaming when coming out of the versed.     Adhesive Tape Rash     Azithromycin Hives and Rash     Cephalexin Itching and Rash        PHYSICAL EXAM:  Pt reports headache today, which has been present for 3 days, rates 4/10.  Headache is located bilaterally behind head and radiates to eyes.    HPI:  Patient reports the following new medical problems since last visit: ED visit for accidental overdose in the setting of medication changes.  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     Patient reports much less shoulder tightness after previous injection due to increase in dose added to shoulder region, but headaches were overall worse this cycle.    We reviewed the recommended safety guidelines for  Botox from any vaccine injection, such as the seasonal flu vaccine, by a minimum of 10-14 days with Samara Oropeza. She acknowledged understanding.      RESPONSE TO PREVIOUS TREATMENT:  Change in headache pattern following last series of injections with 200 units of  Botox on 10/20/2020, which was an increase from previous dose of 185 units, delivered to shoulder and neck muscles.    Post-procedural headache: Rated as 'Mild' severity.  Duration: 2 days then completely resolved    1.  Headache frequency during this injection cycle:  5 headaches in November (3 were migraines). In December 3 mild headaches, and currently 2 headaches this month.  This is compared to her baseline headache frequency of 30 headache days per month. Patient reports increased headaches this injection cycle compared to previous, possibly due to a change in medication.     2.   Headache duration during this injection cycle:  Headache duration ranged from 2 hours to 3 days   Patient reports 2 episodes of multiple day headaches during this injection cycle.    3.  Headache intensity during this injection cycle:    A.  4-5/10  =  Typical pain level.    B.  8/10  =  Worst pain level, with fever   C.  2/10  =  Lowest pain level.    4.  Change in headache medication usage during this injection cycle:  (For Example:  Able to decrease use of oral pain medications.) Patient reports taking less Excedrin Migraine medication this cycle.     5.  ER Visits During This Injection Cycle:  None due to migraine headache.     6.  Functional Performance:  Change in ADL's, social interaction, days lost from work, etc.  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     BOTULINUM NEUROTOXIN INJECTION PROCEDURES:    VERIFICATION OF PATIENT IDENTIFICATION AND PROCEDURE     Initials   Patient Name [facility]   Patient  cali   Procedure Verified by: cali     Prior to the start of the procedure and with procedural staff participation, I verbally confirmed the patient s identity using two indicators, relevant allergies, that the procedure was appropriate and matched the consent or emergent situation, and that the correct equipment/implants were available. Immediately prior to starting the procedure I conducted the Time Out with the procedural staff and re-confirmed the patient s name, procedure, and site/side. (The Joint Commission universal protocol was followed.)  Yes    Sedation (Moderate or Deep): None    Above assessments performed by:  Sharmila Lynn, PT, Care Coordinator    Alejandrina Hernandez MD     INDICATIONS FOR PROCEDURES:  Samara Oropeza is a 25 year old patient with a complex medical history that includes chronic migraine headaches associated with cervicogenic components.      Her baseline symptoms have been recalcitrant to  oral medications and conservative therapy.  She is here today for re-evaluation and it was decided to reinject with Botox.     GOAL OF PROCEDURE:  The goal of this procedure is to decrease pain.    TOTAL DOSE: 200 UNITS BOTOX  Dose Administered:  200 units  Botox (Botulinum Toxin Type A)       2:1 Dilution   Diluent Used:  0.25% Sensorcaine  (NDC: 55150-167-10,  Batch: [id number],  Exp: 8/2022)  Total Volume of Diluent Used:  4 ml  Lot # /C3 with Expiration Date:  6/2023  NDC #: Botox 100u ([address]-08)     Medication guide was offered to patient and was declined.    CONSENT:  The risks, benefits, and treatment options were discussed with Samara Oropeza and she agreed to proceed.    Written consent was obtained by tracy.     EQUIPMENT USED:  Needle-30 gauge  25-mm EMG needle  EMG    SKIN PREPARATION:  Skin preparation was performed using an alcohol wipe.     GUIDANCE DESCRIPTION:  Electro-myographic guidance was necessary throughout the shoulder and neck muscles to accurately identify all areas of dystonic muscles while avoiding injection of non-dystonic muscles and non-spastic muscles, neighboring nerves and nearby vascular structures.     AREA/MUSCLE INJECTED:  200 UNITS BOTOX = TOTAL DOSE     1 & 2. SHOULDER GIRDLE & NECK MUSCLES: 50 units Botox = Total Dose, 2:1 Dilution      Right Splenius Cervicis - 5 units of Botox at 1 site/s.   Left Splenius Cervicis - 5 units of Botox at 1 site/s.     Right Rectus Capitis - 2.5 units of Botox at 1 site.  Left Rectus Capitis - 2.5 units of Botox at 1site.     Right Lateral Trapezius - 10 units of Botox at 4 sites/s.  Left Lateral Trapezius - 10 units of Botox at 4 site/s.      Right Levator Scapulae - 7.5 units of Botox at 2 site/s.   Left Levator Scapulae - 7.5 units of Botox at 2 site/s.       3. HEAD & SCALP MUSCLES: 150 units Botox = Total Dose, 2:1 Dilution     Right Occipitalis - 5 units of Botox at 3 site/s.   Left Occipitalis - 5 units of Botox at 3  site/s.     Right Frontalis - 15 units of Botox at 4 site/s.  Left Frontalis - 15 units of Botox at 4 site/s.     Right Temporalis - 47.5 units of Botox at 8 site/s.  Left Temporalis - 47.5 units of Botox at 8 site/s.     Right  - 5 units of Botox at 1 site/s.              Left  - 5 units of Botox at 1 site/s.                 Procerus - 5 units of Botox at 1 site/s.      RESPONSE TO PROCEDURE:  Samara Oropeza tolerated the procedure well and there were no immediate complications.   She was allowed to recover for an appropriate period of time and was discharged home in stable condition.    FOLLOW UP:  Samara Oropeza was asked to follow up by phone in 7-14 days with Sharmila Lynn PT, Care Coordinator or Vidya Dickinson RN, Care Coordinator, to report her response to this series of injections.  Based on the patient's previous response to this therapy, Samara Oropeza was rescheduled for the next series of injections in 12 weeks.    PLAN (Medication Changes, Therapy Orders, Work or Disability Issues, etc.):   Patient will continue to monitor response to today's injections.

## 2021-01-13 NOTE — NURSING NOTE
Chief Complaint   Patient presents with     Botox     [facility] Return Botox     Headache     Dystonia     Cervical     Farzad Damon

## 2021-01-15 ENCOUNTER — HEALTH MAINTENANCE LETTER (OUTPATIENT)
Age: 26
End: 2021-01-15

## 2021-02-11 DIAGNOSIS — F41.1 GAD (GENERALIZED ANXIETY DISORDER): ICD-10-CM

## 2021-02-11 RX ORDER — GABAPENTIN 300 MG/1
CAPSULE ORAL
Qty: 90 CAPSULE | Refills: 0 | Status: ON HOLD | OUTPATIENT
Start: 2021-02-11 | End: 2023-02-12

## 2021-02-11 NOTE — TELEPHONE ENCOUNTER
Routing refill request to provider for review/approval because:  Drug not on the [facility] refill protocol   Jo SAVAGE RN

## 2021-03-24 ENCOUNTER — MYC MEDICAL ADVICE (OUTPATIENT)
Dept: RHEUMATOLOGY | Facility: CLINIC | Age: 26
End: 2021-03-24

## 2021-03-24 DIAGNOSIS — M35.2 BEHCET'S DISEASE (H): ICD-10-CM

## 2021-04-02 NOTE — TELEPHONE ENCOUNTER
Called and spoke with pt regarding her insurance, verified that we have the correct one.  The one in the chart is correct    She is wondering what she should do in the meantime, as she is on colchicine 1 tab in am and 1 tab in pm.  And the lesions are coming back.  When she is on both it helps, but currently being on just colchicine, is not helping.  She has been off Otezla since 2019.     Lori Miner RN  [facility]

## 2021-04-05 ENCOUNTER — TELEPHONE (OUTPATIENT)
Dept: RHEUMATOLOGY | Facility: CLINIC | Age: 26
End: 2021-04-05

## 2021-04-05 NOTE — TELEPHONE ENCOUNTER
Prior Authorization Approval    Authorization Effective Date: 4/5/2021  Authorization Expiration Date: 4/5/2022  Medication: OTEZLA - Approved  Approved Dose/Quantity:  Starter and maintenance   Reference #:     Insurance Company: [employer] - Phone [phone] Fax [phone]  Expected CoPay: $3     CoPay Card Available:      Foundation Assistance Needed:    Which Pharmacy is filling the prescription (Not needed for infusion/clinic administered): [facility] - [place], [place] - [address]  Pharmacy Notified: Yes  Patient Notified: Yes

## 2021-04-05 NOTE — TELEPHONE ENCOUNTER
Dominga Sosa MD Beard, Madeline, RN   Caller: Unspecified (3 days ago,  3:41 PM)   Phone Number: [phone]             If she tolerates (caused mood changes in the past), prednisone 4tab=20 mg qd x 5 days, 3tab=15 mg qd x 5 days, 2tab=10 mg qd x 5 days, 1 tab=5 mg qd x 5 days then stop      Otezla has been approved.  Pt would prefer to just get restarted on the otezla.    Lori Miner RN  [facility]

## 2021-04-05 NOTE — TELEPHONE ENCOUNTER
PA Initiation    Medication: OTEZLA   Insurance Company: [employer] - Phone [phone] Fax [phone]  Pharmacy Filling the Rx: [facility] - [place], [place] - [facility] [address]  Filling Pharmacy Phone:    Filling Pharmacy Fax:    Start Date: 4/5/2021    LUCINA BAH ([place]: [id number])

## 2021-04-08 ENCOUNTER — OFFICE VISIT (OUTPATIENT)
Dept: PHYSICAL MEDICINE AND REHAB | Facility: CLINIC | Age: 26
End: 2021-04-08
Payer: COMMERCIAL

## 2021-04-08 VITALS — SYSTOLIC BLOOD PRESSURE: 115 MMHG | HEART RATE: 106 BPM | DIASTOLIC BLOOD PRESSURE: 82 MMHG | OXYGEN SATURATION: 99 %

## 2021-04-08 DIAGNOSIS — G43.719 CHRONIC MIGRAINE WITHOUT AURA, INTRACTABLE, WITHOUT STATUS MIGRAINOSUS: Primary | ICD-10-CM

## 2021-04-08 PROCEDURE — 64615 CHEMODENERV MUSC MIGRAINE: CPT | Performed by: PHYSICAL MEDICINE & REHABILITATION

## 2021-04-08 RX ORDER — SODIUM FLUORIDE 5 MG/ML
1 PASTE, DENTIFRICE DENTAL DAILY
COMMUNITY
Start: 2021-02-01

## 2021-04-08 RX ORDER — BUPIVACAINE HYDROCHLORIDE 2.5 MG/ML
4 INJECTION, SOLUTION EPIDURAL; INFILTRATION; INTRACAUDAL ONCE
Status: COMPLETED | OUTPATIENT
Start: 2021-04-08 | End: 2021-04-08

## 2021-04-08 RX ADMIN — BUPIVACAINE HYDROCHLORIDE 10 MG: 2.5 INJECTION, SOLUTION EPIDURAL; INFILTRATION; INTRACAUDAL at 12:28

## 2021-04-08 ASSESSMENT — PAIN SCALES - GENERAL: PAINLEVEL: MODERATE PAIN (4)

## 2021-04-08 NOTE — PROGRESS NOTES
BOTULINUM TOXIN PROCEDURE - HEADACHE - NOTE    Chief Complaint   Patient presents with     Botox     [facility] RETURN - BOTOX      /82   Pulse 106   SpO2 99%      Current Outpatient Medications:      sodium fluoride (DENTA 5000 PLUS) 1.1 % CREA, Apply 1 Application topically daily, Disp: , Rfl:      albuterol (PROAIR HFA/PROVENTIL HFA/VENTOLIN HFA) 108 (90 Base) MCG/ACT inhaler, Inhale 2 puffs into the lungs every 6 hours as needed for shortness of breath / dyspnea or wheezing, Disp: 1 Inhaler, Rfl: 1     amitriptyline (ELAVIL) 25 MG tablet, TAKE 1 TABLET BY MOUTH EVERY NIGHT AT BEDTIME, Disp: 90 tablet, Rfl: 1     Apremilast (OTEZLA) 10 & 20 & 30 MG TBPK, Take by mouth according to the instructions on the packet. Hold for signs of infection,any GI upset, weight loss or depression concerns, and seek medical attention., Disp: 1 each, Rfl: 0     apremilast (OTEZLA) 30 MG tablet, Take 1 tablet (30 mg) by mouth 2 times daily Hold for signs of infection, and seek medical attention., Disp: 180 tablet, Rfl: 3     artificial tears OINT ophthalmic ointment, 0.5 inch strip each eye at night, Disp: 1 Tube, Rfl: 11     benzocaine (TOPICALE XTRA) 20 % GEL, Apply as needed locally to mouth or nasal ulcers for pain; 4 times daily as needed, Disp: 30 g, Rfl: 1     betamethasone valerate (VALISONE) 0.1 % cream, Apply topically 2 times daily as needed , Disp: , Rfl:      bisacodyl (DULCOLAX) 5 MG EC tablet, Take 2 tablets (10 mg) by mouth daily as needed for constipation, Disp: 30 tablet, Rfl: 0     citalopram (CELEXA) 20 MG tablet, Take 1 tablet (20 mg) by mouth daily, Disp: 90 tablet, Rfl: 1     colchicine (COLCYRS) 0.6 MG tablet, TAKE 1 TABLET (0.6 MG) BY MOUTH 2 TIMES DAILY, Disp: 180 tablet, Rfl: 1     cyproheptadine (PERIACTIN) 4 MG tablet, TAKE 2 TABLETS (8 MG) BY MOUTH AT BEDTIME FOR NIGHTMARES, Disp: 180 tablet, Rfl: 1     diclofenac (VOLTAREN) 75 MG EC tablet, Take 1 tablet (75 mg) by mouth 2 times daily as needed for  moderate pain, Disp: 60 tablet, Rfl: 1     dicyclomine (BENTYL) 20 MG tablet, Take 1 tablet (20 mg) by mouth 4 times daily as needed, Disp: 120 tablet, Rfl: 3     diphenhydrAMINE (BENADRYL) 25 MG tablet, Take 1 tablet (25 mg) by mouth 3 times daily as needed (itching), Disp: 40 tablet, Rfl: 1     EPINEPHrine (EPIPEN 2-EAMON) 0.3 MG/0.3ML injection, Inject 0.3 mLs (0.3 mg) into the muscle as needed for anaphylaxis, Disp: 0.6 mL, Rfl: 3     furosemide (LASIX) 20 MG tablet, Take 1 tablet (20 mg) by mouth 3 times daily, Disp: 60 tablet, Rfl: 3     gabapentin (NEURONTIN) 300 MG capsule, TAKE ONE CAPSULE BY MOUTH THREE TIMES DAILY , Disp: 90 capsule, Rfl: 0     guanFACINE (TENEX) 2 MG tablet, Take one and one half tabs in the morning and one and one half in the evening., Disp: 270 tablet, Rfl: 3     hypromellose (GENTEAL) 0.3 % SOLN, 1 drop every hour as needed for dry eyes , Disp: , Rfl:      lactulose 20 GM/30ML SOLN, Take 30 mLs by mouth 3 times daily as needed (for constipation), Disp: 300 mL, Rfl: 3     lidocaine (LMX4) 4 % external cream, Apply topically once as needed for mild pain, Disp: 120 g, Rfl: 1     lidocaine (LMX4) 4 % external cream, Apply 1 Application topically once as needed for mild pain, Disp: , Rfl:      LINZESS 290 MCG capsule, TAKE 1 CAPSULE BY MOUTH EVERY DAY IN THE MORNING BEFORE BREAKFAST, Disp: 90 capsule, Rfl: 0     LORazepam (ATIVAN) 0.5 MG tablet, TAKE 1 TABLET BY MOUTH EVERY 6 HOURS AS NEEDED FOR ANXIETY, Disp: 10 tablet, Rfl: 0     ondansetron (ZOFRAN-ODT) 8 MG ODT tab, Take 1 tablet (8 mg) by mouth every 8 hours as needed for nausea, Disp: 180 tablet, Rfl: 1     order for DME, Equipment being ordered: Trilok brace 8 1/2 left lower extremity, Disp: 1 Device, Rfl: 0     order for DME, Equipment being ordered: size 8 1/2 walking boot tall, Disp: 1 Device, Rfl: 0     polyethylene glycol (MIRALAX/GLYCOLAX) powder, Take 1 capful by mouth 3 times daily, Disp: , Rfl:      sucralfate (CARAFATE) 1  GM/10ML suspension, Take 10 mLs (1 g) by mouth 4 times daily, Disp: 1200 mL, Rfl: 2     triamcinolone (KENALOG) 0.5 % external ointment, Apply 1 g topically 3 times daily as needed for irritation, Disp: 15 g, Rfl: 3    Current Facility-Administered Medications:      botulinum toxin type A (BOTOX) 100 units injection 200 Units, 200 Units, Intramuscular, Q90 Days, Alejandrina Hernandez MD     lidocaine 1 % injection 0.5 mL, 0.5 mL, , , Andres Johnson, DPM, 0.5 mL at 09/15/20 1554     methylPREDNISolone (DEPO-MEDROL) injection 40 mg, 40 mg, , , Yeo, Albert, MD, 40 mg at 09/29/20 1154     triamcinolone (KENALOG-40) injection 40 mg, 40 mg, , , Andres Johnson, DPM, 40 mg at 09/15/20 1554     Allergies   Allergen Reactions     Amoxil [Penicillins] Rash     Dad unsure of reaction.     Bee Venom Anaphylaxis     Bioflavonoids Anaphylaxis     Citrus Anaphylaxis     All [place]     Contrast Dye Rash     Contrast Media Ready-Box [facility] [place], 04/09/2014.; Contrast Media Ready-Box [facility], 04/09/2014.  NOTE: this is a contrast media oral with iodine. Premedicate with methylpred standard for IV contrast, request barium contrast for oral contrast.     Diagnostic X-Ray Materials Hives and Rash     Contrast Media Ready-Box [facility] [place], 04/09/2014.; Contrast Media Ready-Box [facility] [place], 04/09/2014.  NOTE: this is a contrast media oral with iodine. Premedicate with methylpred standard for IV contrast, request barium contrast for oral contrast.     Pineapple Anaphylaxis, Difficulty breathing and Rash     Reglan [Metoclopramide] Other (See Comments)     IV dose only, in ER, rapid heart rate.     Ace Inhibitors      Difficulty in breathing and GI upset     Amitiza [Lubiprostone] Nausea and Vomiting     Amoxicillin-Pot Clavulanate      Midazolam Unknown     parent states that when pt takes this medication, she wakes up being very violent .     No Clinical Screening - See Comments      Bleech/ chest tightness, itchy throat, swollen tongue, hives      "Tizanidine Other (See Comments)     Confusion, back pain, photophobia, abdominal pain, shaking, anxious       Versed      Coming out of pelvic exam at age of 5, was kicking and screaming when coming out of the versed.     Adhesive Tape Rash     Azithromycin Hives and Rash     Cephalexin Itching and Rash        PHYSICAL EXAM:  Pt reports headache today, which has been present since last night, rates 2/10.  Headache is located bilaterally L>R temples and posterior head    HPI:  Patient reports the following new medical problems since last visit: ED visit for accidental overdose in the setting of medication changes.  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 She experienced a severe 4 day migraine located in a \"new spot\" behind her eyes.     Patient reports much less shoulder tightness after previous injection due to increase in dose added to shoulder region, but headaches were overall worse this cycle.    We reviewed the recommended safety guidelines for  Botox from any vaccine injection, such as the seasonal flu vaccine, by a minimum of 10-14 days with Samara Oropeza. She acknowledged understanding.      RESPONSE TO PREVIOUS TREATMENT:  Change in headache pattern following last series of injections with 200 units of  Botox on 1/13/2021 delivered to shoulder and neck muscles.    Post-procedural headache: Rated as 'Mild' severity.  Duration:  3 days then completely resolved    1.  Headache frequency during this injection cycle:  Patient reports 10 headache days last month, no headaches the first 2 months after injection. This past week she has had 3 headache days.  This is compared to her baseline headache frequency of 30 headache days per month.    2.  Headache duration during this injection cycle:  Headache duration ranged from 1 day (rated 3/10) to 4 days (3-10/10).  Patient reports 2 episodes of multiple day headaches during this injection cycle.    3.  " Headache intensity during this injection cycle:    A.  4/10  =  Typical pain level.    B.  10/10  =  Worst pain level, with fever   C.  3/10  =  Lowest pain level.    4.  Change in headache medication usage during this injection cycle:  (For Example:  Able to decrease use of oral pain medications.) Patient reports taking less Excedrin Migraine medication this cycle, other than during her 4 day headache.    5.  ER Visits During This Injection Cycle:  None due to migraine headache, though she thought about going the end of March when she had a severe migraine with vomitting.     6.  Functional Performance:  Change in ADL's, social interaction, days lost from work, etc.  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     BOTULINUM NEUROTOXIN INJECTION PROCEDURES:    VERIFICATION OF PATIENT IDENTIFICATION AND PROCEDURE     Initials   Patient Name [facility]   Patient  [facility]   Procedure Verified by: tracy     Prior to the start of the procedure and with procedural staff participation, I verbally confirmed the patient s identity using two indicators, relevant allergies, that the procedure was appropriate and matched the consent or emergent situation, and that the correct equipment/implants were available. Immediately prior to starting the procedure I conducted the Time Out with the procedural staff and re-confirmed the patient s name, procedure, and site/side. (The Joint Commission universal protocol was followed.)  Yes    Sedation (Moderate or Deep): None    Above assessments performed by:  Sharmila Lynn PT, Care Coordinator    Alejandrina Hernandez MD       INDICATIONS FOR PROCEDURES:  Samara Oropeza is a 25 year old patient with a complex medical history that includes chronic migraine headaches associated with cervicogenic components.      Her baseline symptoms have been recalcitrant to oral medications and conservative therapy.  She is here today for re-evaluation and it was decided to reinject with Botox.     GOAL OF  PROCEDURE:  The goal of this procedure is to decrease pain.    TOTAL DOSE: 200 UNITS BOTOX  Dose Administered:  200 units  Botox (Botulinum Toxin Type A)       2:1 Dilution   Diluent Used:  0.25% Sensorcaine  (NDC: 55150-167-10,  Batch: [id number],  Exp: 10/2023)  Total Volume of Diluent Used:  4 ml  Lot # [id number] with Expiration Date:  7/2023  NDC #: Botox 100u ([address]-66)     Medication guide was offered to patient and was declined.    CONSENT:  The risks, benefits, and treatment options were discussed with Samara Robersonon and she agreed to proceed.    Written consent was obtained by [facility].     EQUIPMENT USED:  Needle-30 gauge  25-mm EMG needle  EMG    SKIN PREPARATION:  Skin preparation was performed using an alcohol wipe.     GUIDANCE DESCRIPTION:  Electro-myographic guidance was necessary throughout the shoulder and neck muscles to accurately identify all areas of dystonic muscles while avoiding injection of non-dystonic muscles and non-spastic muscles, neighboring nerves and nearby vascular structures.     AREA/MUSCLE INJECTED:  200 UNITS BOTOX = TOTAL DOSE     1 & 2. SHOULDER GIRDLE & NECK MUSCLES: 45 units Botox = Total Dose, 2:1 Dilution      Right Splenius Cervicis - 10 units of Botox over 2 site/s.   Left Splenius Cervicis - 10 units of Botox over 2 site/s.     Right Rectus Capitis - 2.5 units of Botox at 1 site.  Left Rectus Capitis - 2.5 units of Botox at 1site.     Right Lateral Trapezius - 5 units of Botox over 2 sites/s.  Left Lateral Trapezius - 5 units of Botox over 2 site/s.      Right Levator Scapulae - 5 units of Botox over 2 site/s.   Left Levator Scapulae - 5 units of Botox over 2 site/s.       3. HEAD & SCALP MUSCLES: 155 units Botox = Total Dose, 2:1 Dilution     Right Occipitalis - 5 units of Botox at 1 site/s.   Left Occipitalis - 5 units of Botox at 1 site/s.     Right Frontalis - 20 units of Botox over 4 site/s.  Left Frontalis - 20 units of Botox over 4 site/s.     Right  Temporalis - 45 units of Botox over 8 site/s.  Left Temporalis - 45 units of Botox over 8 site/s.     Right  - 5 units of Botox at 1 site/s.              Left  - 5 units of Botox at 1 site/s.                 Procerus - 5 units of Botox at 1 site/s.      RESPONSE TO PROCEDURE:  Samara Oropeza tolerated the procedure well and there were no immediate complications.   She was allowed to recover for an appropriate period of time and was discharged home in stable condition.    FOLLOW UP:  Samara Oropeza was asked to follow up by phone in 7-14 days with Sharmila Lynn PT to report her response to this series of injections.  Based on the patient's previous response to this therapy, Samara Oropeza was rescheduled for the next series of injections in 12 weeks.    PLAN (Medication Changes, Therapy Orders, Work or Disability Issues, etc.):   Patient will continue to monitor response to today's injections.

## 2021-04-08 NOTE — LETTER
4/8/2021       RE: Samara Oropeza  [address]  [address]  [facility] [place] [address]     Dear Colleague,    Thank you for referring your patient, Samara Oropeza, to the [facility] [place] [facility]. Please see a copy of my visit note below.    BOTULINUM TOXIN PROCEDURE - HEADACHE - NOTE    Chief Complaint   Patient presents with     Botox     UMP RETURN - BOTOX      /82   Pulse 106   SpO2 99%      Current Outpatient Medications:      sodium fluoride (DENTA 5000 PLUS) 1.1 % CREA, Apply 1 Application topically daily, Disp: , Rfl:      albuterol (PROAIR HFA/PROVENTIL HFA/VENTOLIN HFA) 108 (90 Base) MCG/ACT inhaler, Inhale 2 puffs into the lungs every 6 hours as needed for shortness of breath / dyspnea or wheezing, Disp: 1 Inhaler, Rfl: 1     amitriptyline (ELAVIL) 25 MG tablet, TAKE 1 TABLET BY MOUTH EVERY NIGHT AT BEDTIME, Disp: 90 tablet, Rfl: 1     Apremilast (OTEZLA) 10 & 20 & 30 MG TBPK, Take by mouth according to the instructions on the packet. Hold for signs of infection,any GI upset, weight loss or depression concerns, and seek medical attention., Disp: 1 each, Rfl: 0     apremilast (OTEZLA) 30 MG tablet, Take 1 tablet (30 mg) by mouth 2 times daily Hold for signs of infection, and seek medical attention., Disp: 180 tablet, Rfl: 3     artificial tears OINT ophthalmic ointment, 0.5 inch strip each eye at night, Disp: 1 Tube, Rfl: 11     benzocaine (TOPICALE XTRA) 20 % GEL, Apply as needed locally to mouth or nasal ulcers for pain; 4 times daily as needed, Disp: 30 g, Rfl: 1     betamethasone valerate (VALISONE) 0.1 % cream, Apply topically 2 times daily as needed , Disp: , Rfl:      bisacodyl (DULCOLAX) 5 MG EC tablet, Take 2 tablets (10 mg) by mouth daily as needed for constipation, Disp: 30 tablet, Rfl: 0     citalopram (CELEXA) 20 MG tablet, Take 1 tablet (20 mg) by mouth  daily, Disp: 90 tablet, Rfl: 1     colchicine (COLCYRS) 0.6 MG tablet, TAKE 1 TABLET (0.6 MG) BY MOUTH 2 TIMES DAILY, Disp: 180 tablet, Rfl: 1     cyproheptadine (PERIACTIN) 4 MG tablet, TAKE 2 TABLETS (8 MG) BY MOUTH AT BEDTIME FOR NIGHTMARES, Disp: 180 tablet, Rfl: 1     diclofenac (VOLTAREN) 75 MG EC tablet, Take 1 tablet (75 mg) by mouth 2 times daily as needed for moderate pain, Disp: 60 tablet, Rfl: 1     dicyclomine (BENTYL) 20 MG tablet, Take 1 tablet (20 mg) by mouth 4 times daily as needed, Disp: 120 tablet, Rfl: 3     diphenhydrAMINE (BENADRYL) 25 MG tablet, Take 1 tablet (25 mg) by mouth 3 times daily as needed (itching), Disp: 40 tablet, Rfl: 1     EPINEPHrine (EPIPEN 2-EAMON) 0.3 MG/0.3ML injection, Inject 0.3 mLs (0.3 mg) into the muscle as needed for anaphylaxis, Disp: 0.6 mL, Rfl: 3     furosemide (LASIX) 20 MG tablet, Take 1 tablet (20 mg) by mouth 3 times daily, Disp: 60 tablet, Rfl: 3     gabapentin (NEURONTIN) 300 MG capsule, TAKE ONE CAPSULE BY MOUTH THREE TIMES DAILY , Disp: 90 capsule, Rfl: 0     guanFACINE (TENEX) 2 MG tablet, Take one and one half tabs in the morning and one and one half in the evening., Disp: 270 tablet, Rfl: 3     hypromellose (GENTEAL) 0.3 % SOLN, 1 drop every hour as needed for dry eyes , Disp: , Rfl:      lactulose 20 GM/30ML SOLN, Take 30 mLs by mouth 3 times daily as needed (for constipation), Disp: 300 mL, Rfl: 3     lidocaine (LMX4) 4 % external cream, Apply topically once as needed for mild pain, Disp: 120 g, Rfl: 1     lidocaine (LMX4) 4 % external cream, Apply 1 Application topically once as needed for mild pain, Disp: , Rfl:      LINZESS 290 MCG capsule, TAKE 1 CAPSULE BY MOUTH EVERY DAY IN THE MORNING BEFORE BREAKFAST, Disp: 90 capsule, Rfl: 0     LORazepam (ATIVAN) 0.5 MG tablet, TAKE 1 TABLET BY MOUTH EVERY 6 HOURS AS NEEDED FOR ANXIETY, Disp: 10 tablet, Rfl: 0     ondansetron (ZOFRAN-ODT) 8 MG ODT tab, Take 1 tablet (8 mg) by mouth every 8 hours as needed for  nausea, Disp: 180 tablet, Rfl: 1     order for DME, Equipment being ordered: Trilok brace 8 1/2 left lower extremity, Disp: 1 Device, Rfl: 0     order for DME, Equipment being ordered: size 8 1/2 walking boot tall, Disp: 1 Device, Rfl: 0     polyethylene glycol (MIRALAX/GLYCOLAX) powder, Take 1 capful by mouth 3 times daily, Disp: , Rfl:      sucralfate (CARAFATE) 1 GM/10ML suspension, Take 10 mLs (1 g) by mouth 4 times daily, Disp: 1200 mL, Rfl: 2     triamcinolone (KENALOG) 0.5 % external ointment, Apply 1 g topically 3 times daily as needed for irritation, Disp: 15 g, Rfl: 3    Current Facility-Administered Medications:      botulinum toxin type A (BOTOX) 100 units injection 200 Units, 200 Units, Intramuscular, Q90 Days, StandalAlejandrina MD     lidocaine 1 % injection 0.5 mL, 0.5 mL, , , Andres Johnson DPM, 0.5 mL at 09/15/20 1554     methylPREDNISolone (DEPO-MEDROL) injection 40 mg, 40 mg, , , Yeo, Albert, MD, 40 mg at 09/29/20 1154     triamcinolone (KENALOG-40) injection 40 mg, 40 mg, , , Andres Johnson DPM, 40 mg at 09/15/20 1554     Allergies   Allergen Reactions     Amoxil [Penicillins] Rash     Dad unsure of reaction.     Bee Venom Anaphylaxis     Bioflavonoids Anaphylaxis     Citrus Anaphylaxis     All Lajas     Contrast Dye Rash     Contrast Media Ready-Box [facility] [place], 04/09/2014.; Contrast Media Ready-Box [facility] [place], 04/09/2014.  NOTE: this is a contrast media oral with iodine. Premedicate with methylpred standard for IV contrast, request barium contrast for oral contrast.     Diagnostic X-Ray Materials Hives and Rash     Contrast Media Ready-Box [facility] [place], 04/09/2014.; Contrast Media Ready-Box [facility], 04/09/2014.  NOTE: this is a contrast media oral with iodine. Premedicate with methylpred standard for IV contrast, request barium contrast for oral contrast.     Pineapple Anaphylaxis, Difficulty breathing and Rash     Reglan [Metoclopramide] Other (See Comments)     IV dose only, in ER, rapid heart  "rate.     Ace Inhibitors      Difficulty in breathing and GI upset     Amitiza [Lubiprostone] Nausea and Vomiting     Amoxicillin-Pot Clavulanate      Midazolam Unknown     parent states that when pt takes this medication, she wakes up being very violent .     No Clinical Screening - See Comments      Bleech/ chest tightness, itchy throat, swollen tongue, hives     Tizanidine Other (See Comments)     Confusion, back pain, photophobia, abdominal pain, shaking, anxious       Versed      Coming out of pelvic exam at age of 5, was kicking and screaming when coming out of the versed.     Adhesive Tape Rash     Azithromycin Hives and Rash     Cephalexin Itching and Rash        PHYSICAL EXAM:  Pt reports headache today, which has been present since last night, rates 2/10.  Headache is located bilaterally L>R temples and posterior head    HPI:  Patient reports the following new medical problems since last visit: ED visit for accidental overdose in the setting of medication changes.  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 She experienced a severe 4 day migraine located in a \"new spot\" behind her eyes.     Patient reports much less shoulder tightness after previous injection due to increase in dose added to shoulder region, but headaches were overall worse this cycle.    We reviewed the recommended safety guidelines for  Botox from any vaccine injection, such as the seasonal flu vaccine, by a minimum of 10-14 days with Samara Oropeza. She acknowledged understanding.      RESPONSE TO PREVIOUS TREATMENT:  Change in headache pattern following last series of injections with 200 units of  Botox on 1/13/2021 delivered to shoulder and neck muscles.    Post-procedural headache: Rated as 'Mild' severity.  Duration:  3 days then completely resolved    1.  Headache frequency during this injection cycle:  Patient reports 10 headache days last month, no headaches the first 2 " months after injection. This past week she has had 3 headache days.  This is compared to her baseline headache frequency of 30 headache days per month.    2.  Headache duration during this injection cycle:  Headache duration ranged from 1 day (rated 3/10) to 4 days (3-10/10).  Patient reports 2 episodes of multiple day headaches during this injection cycle.    3.  Headache intensity during this injection cycle:    A.  4/10  =  Typical pain level.    B.  10/10  =  Worst pain level, with fever   C.  3/10  =  Lowest pain level.    4.  Change in headache medication usage during this injection cycle:  (For Example:  Able to decrease use of oral pain medications.) Patient reports taking less Excedrin Migraine medication this cycle, other than during her 4 day headache.    5.  ER Visits During This Injection Cycle:  None due to migraine headache, though she thought about going the end of March when she had a severe migraine with vomitting.     6.  Functional Performance:  Change in ADL's, social interaction, days lost from work, etc.  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     BOTULINUM NEUROTOXIN INJECTION PROCEDURES:    VERIFICATION OF PATIENT IDENTIFICATION AND PROCEDURE     Initials   Patient Name [facility]   Patient  [facility]   Procedure Verified by: tracy     Prior to the start of the procedure and with procedural staff participation, I verbally confirmed the patient s identity using two indicators, relevant allergies, that the procedure was appropriate and matched the consent or emergent situation, and that the correct equipment/implants were available. Immediately prior to starting the procedure I conducted the Time Out with the procedural staff and re-confirmed the patient s name, procedure, and site/side. (The Joint Commission universal protocol was followed.)  Yes    Sedation (Moderate or Deep): None    Above assessments performed by:  Sharmila Lynn PT, Care Coordinator    Alejandrina Hernandez MD        INDICATIONS FOR PROCEDURES:  Samara Oropeza is a 25 year old patient with a complex medical history that includes chronic migraine headaches associated with cervicogenic components.      Her baseline symptoms have been recalcitrant to oral medications and conservative therapy.  She is here today for re-evaluation and it was decided to reinject with Botox.     GOAL OF PROCEDURE:  The goal of this procedure is to decrease pain.    TOTAL DOSE: 200 UNITS BOTOX  Dose Administered:  200 units  Botox (Botulinum Toxin Type A)       2:1 Dilution   Diluent Used:  0.25% Sensorcaine  (NDC: 55150-167-10,  Batch: [id number],  Exp: 10/2023)  Total Volume of Diluent Used:  4 ml  Lot # [id number] with Expiration Date:  7/2023  NDC #: Botox 100u ([address]-81)     Medication guide was offered to patient and was declined.    CONSENT:  The risks, benefits, and treatment options were discussed with Samara Oropeza and she agreed to proceed.    Written consent was obtained by [facility].     EQUIPMENT USED:  Needle-30 gauge  25-mm EMG needle  EMG    SKIN PREPARATION:  Skin preparation was performed using an alcohol wipe.     GUIDANCE DESCRIPTION:  Electro-myographic guidance was necessary throughout the shoulder and neck muscles to accurately identify all areas of dystonic muscles while avoiding injection of non-dystonic muscles and non-spastic muscles, neighboring nerves and nearby vascular structures.     AREA/MUSCLE INJECTED:  200 UNITS BOTOX = TOTAL DOSE     1 & 2. SHOULDER GIRDLE & NECK MUSCLES: 45 units Botox = Total Dose, 2:1 Dilution      Right Splenius Cervicis - 10 units of Botox over 2 site/s.   Left Splenius Cervicis - 10 units of Botox over 2 site/s.     Right Rectus Capitis - 2.5 units of Botox at 1 site.  Left Rectus Capitis - 2.5 units of Botox at 1site.     Right Lateral Trapezius - 5 units of Botox over 2 sites/s.  Left Lateral Trapezius - 5 units of Botox over 2 site/s.      Right Levator Scapulae - 5 units of  Botox over 2 site/s.   Left Levator Scapulae - 5 units of Botox over 2 site/s.       3. HEAD & SCALP MUSCLES: 155 units Botox = Total Dose, 2:1 Dilution     Right Occipitalis - 5 units of Botox at 1 site/s.   Left Occipitalis - 5 units of Botox at 1 site/s.     Right Frontalis - 20 units of Botox over 4 site/s.  Left Frontalis - 20 units of Botox over 4 site/s.     Right Temporalis - 45 units of Botox over 8 site/s.  Left Temporalis - 45 units of Botox over 8 site/s.     Right  - 5 units of Botox at 1 site/s.              Left  - 5 units of Botox at 1 site/s.                 Procerus - 5 units of Botox at 1 site/s.      RESPONSE TO PROCEDURE:  Samara Oropeza tolerated the procedure well and there were no immediate complications.   She was allowed to recover for an appropriate period of time and was discharged home in stable condition.    FOLLOW UP:  Samara Oropeza was asked to follow up by phone in 7-14 days with Sharmila Lynn PT to report her response to this series of injections.  Based on the patient's previous response to this therapy, Samara Oropeza was rescheduled for the next series of injections in 12 weeks.    PLAN (Medication Changes, Therapy Orders, Work or Disability Issues, etc.):   Patient will continue to monitor response to today's injections.     Again, thank you for allowing me to participate in the care of your patient.      Sincerely,    Alejandrina Hernandez MD

## 2021-05-10 DIAGNOSIS — F41.1 GAD (GENERALIZED ANXIETY DISORDER): ICD-10-CM

## 2021-05-10 RX ORDER — GABAPENTIN 300 MG/1
CAPSULE ORAL
Qty: 90 CAPSULE | Refills: 0 | OUTPATIENT
Start: 2021-05-10

## 2021-05-10 NOTE — TELEPHONE ENCOUNTER
Notified Pt that she would need to let pharmacy know who her current PCP is since she is not see here. (do not take [employer] insurance)       Elsy Hardin RN   [facility]

## 2021-05-26 ASSESSMENT — PATIENT HEALTH QUESTIONNAIRE - PHQ9
SUM OF ALL RESPONSES TO PHQ QUESTIONS 1-9: 7
SUM OF ALL RESPONSES TO PHQ QUESTIONS 1-9: 7

## 2021-05-27 ASSESSMENT — PATIENT HEALTH QUESTIONNAIRE - PHQ9: SUM OF ALL RESPONSES TO PHQ QUESTIONS 1-9: 7

## 2021-05-28 ASSESSMENT — ANXIETY QUESTIONNAIRES
GAD7 TOTAL SCORE: 9
GAD7 TOTAL SCORE: 7
GAD7 TOTAL SCORE: 7
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
## 2021-06-15 ENCOUNTER — OFFICE VISIT (OUTPATIENT)
Dept: PODIATRY | Facility: CLINIC | Age: 26
End: 2021-06-15
Payer: COMMERCIAL

## 2021-06-15 ENCOUNTER — ANCILLARY PROCEDURE (OUTPATIENT)
Dept: GENERAL RADIOLOGY | Facility: CLINIC | Age: 26
End: 2021-06-15
Attending: PODIATRIST
Payer: COMMERCIAL

## 2021-06-15 VITALS
HEIGHT: 63 IN | SYSTOLIC BLOOD PRESSURE: 124 MMHG | DIASTOLIC BLOOD PRESSURE: 88 MMHG | BODY MASS INDEX: 28.67 KG/M2 | WEIGHT: 161.8 LBS

## 2021-06-15 DIAGNOSIS — G57.92 NEURITIS OF LEFT LOWER EXTREMITY: Primary | ICD-10-CM

## 2021-06-15 DIAGNOSIS — M25.572 PAIN AND SWELLING OF LEFT ANKLE: ICD-10-CM

## 2021-06-15 DIAGNOSIS — M25.472 PAIN AND SWELLING OF LEFT ANKLE: ICD-10-CM

## 2021-06-15 DIAGNOSIS — M25.572 LEFT ANKLE PAIN: ICD-10-CM

## 2021-06-15 PROCEDURE — 99213 OFFICE O/P EST LOW 20 MIN: CPT | Mod: 25 | Performed by: PODIATRIST

## 2021-06-15 PROCEDURE — 20605 DRAIN/INJ JOINT/BURSA W/O US: CPT | Mod: LT | Performed by: PODIATRIST

## 2021-06-15 PROCEDURE — 73610 X-RAY EXAM OF ANKLE: CPT | Mod: LT | Performed by: RADIOLOGY

## 2021-06-15 RX ORDER — TRIAMCINOLONE ACETONIDE 40 MG/ML
40 INJECTION, SUSPENSION INTRA-ARTICULAR; INTRAMUSCULAR
Status: SHIPPED | OUTPATIENT
Start: 2021-06-15

## 2021-06-15 RX ORDER — DEXAMETHASONE SODIUM PHOSPHATE 4 MG/ML
INJECTION, SOLUTION INTRA-ARTICULAR; INTRALESIONAL; INTRAMUSCULAR; INTRAVENOUS; SOFT TISSUE
Qty: 30 ML | Refills: 0 | Status: ON HOLD | OUTPATIENT
Start: 2021-06-15 | End: 2023-02-10

## 2021-06-15 RX ORDER — LIDOCAINE HYDROCHLORIDE 10 MG/ML
0.5 INJECTION, SOLUTION INFILTRATION; PERINEURAL
Status: DISCONTINUED | OUTPATIENT
Start: 2021-06-15 | End: 2023-02-12

## 2021-06-15 RX ADMIN — LIDOCAINE HYDROCHLORIDE 0.5 ML: 10 INJECTION, SOLUTION INFILTRATION; PERINEURAL at 09:57

## 2021-06-15 RX ADMIN — TRIAMCINOLONE ACETONIDE 40 MG: 40 INJECTION, SUSPENSION INTRA-ARTICULAR; INTRAMUSCULAR at 09:57

## 2021-06-15 ASSESSMENT — MIFFLIN-ST. JEOR: SCORE: 1443.05

## 2021-06-15 NOTE — PROGRESS NOTES
"Foot & Ankle Surgery   Mckenzie 15, 2021    S:  Pt is seen today for evaluation of left ankle pain and swelling.  I last saw her on 9/15/2020.  Multiple left ankle surgeries, most recently on 9/25/2019, which included ankle arthroscopy with open anterior talofibular ligament repair with the Arthrex internal brace, as well as debridement of nonviable inflammatory tissue over the ankle..  Her exam on 9/15/2020 was consistent with intra-articular pathology and she was given a diagnostic/therapeutic intra-articular injection.  She states the injection provided multiple months worth of good pain relief.  This is started to wear off.  She has pictures on her phone with significant swelling over the anterolateral ankle where we had previously removed the foamy inflammatory tissue.  She is also having issues with her left knee and her right elbow and is having right elbow surgery in the near future.  She continues to have lateral lower leg pain originating from the common peroneal nerve at the fibula, including diminished sensation along the superficial and deep peroneal nerve distributions    Vitals:    06/15/21 0919   BP: 124/88   Weight: 73.4 kg (161 lb 12.8 oz)   Height: 1.6 m (5' 3\")   '      ROS - Pos for CC.  Patient denies current nausea, vomiting, chills, fevers, belly pain, calf pain, chest pain or SOB.  Complete remainder of ROS it otherwise neg.      PE:  Gen:   No apparent distress  Eye:    Visual scanning without deficit  Ear:    Response to auditory stimuli wnl  Lung:    Non-labored breathing on RA noted  Abd:    NTND per patient report  Lymph:    Spongy fluctuant tissue over the anterolateral ankle at the previous site of the ATFL repair  Vasc:    Pulses palpable, CFT minimally delayed  Neuro:    Light touch sensation diminished at the superficial and deep peroneal nerve distributions  Derm:    Neg for nodules, lesions or ulcerations  MSK:    She is point tender on the left medial recess and anterior gutter.  " Anterior drawer negative for any pain or instability today  Calf:    Neg for redness, swelling or tenderness      Imaging: 3 views weightbearing left ankle demonstrates the defect in the distal fibula at the internal brace implant site.  The ankle mortise is anatomic.  There is no evidence of talar dome lesions.  No malalignment is noted    Assessment:  25 year old female with continued intra-articular left ankle pain and spongy mass over the anterolateral ankle approximately 18 months status post previous ankle scope and open anterior talofibular ligament repair with implant      Plan:  Discussed etiologies, anatomy and options  1.  continued intra-articular left ankle pain and spongy mass over the anterolateral ankle approximately 18 months status post previous ankle scope and open anterior talofibular ligament repair with implant  -I personally reviewed the x-rays with her today  -Her exam again is consistent with intra-articular pathology.  A diagnostic/therapeutic repeat steroid injection was performed, see procedure note for details.  We discussed that as each subsequent injection likely would yield less improvement, at some point we are going to need to scope her ankle and again debrided/remove the spongy inflammatory tissue over the lateral ankle.  surg agustina handout dispensed  -Regarding the common peroneal neuritis symptoms and paresthesias, a physical therapy referral was placed.  We discussed that I have limited optimism as to what this can do for symptom relief.  I advise she discuss this with her knee surgeon to see if a common peroneal nerve decompression would be indicated.    Medium Joint Injection/Arthrocentesis: L ankle    Date/Time: 6/15/2021 9:57 AM  Performed by: Andres Johnson DPM  Authorized by: Andres Johnson DPM     Needle Size:  25 G  Guidance: surface landmarks    Approach:  Anteromedial  Location:  Ankle  Site:  L ankle  Medications:  40 mg triamcinolone 40 MG/ML; 0.5 mL  lidocaine 1 %   After obtaining written consent, the joint was identified and the skin was prepped with alcohol and betadine.  The joint was distracted and the needle was advance to the underlying medial recess of the left ankle.  1 1/2cc mixture of 2:1 kenalog 40:1% lidocaine plain was injected.  The patient tolerated the procedure without complication.  Risks that were discussed included possible joint/cartilage damage, infection, pigment change, steroid flare.             Follow up:  Prn based on injection results or sooner with acute issues           Andres Johnson DPM FACFAS FACFAOM  Podiatric Foot & Ankle Surgeon  [facility]  [phone]    Disclaimer: This note consists of symbols derived from keyboarding, dictation and/or voice recognition software. As a result, there may be errors in the script that have gone undetected. Please consider this when interpreting information found in this chart.
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
## 2021-07-09 ENCOUNTER — OFFICE VISIT (OUTPATIENT)
Dept: PHYSICAL MEDICINE AND REHAB | Facility: CLINIC | Age: 26
End: 2021-07-09
Payer: COMMERCIAL

## 2021-07-09 VITALS
RESPIRATION RATE: 16 BRPM | DIASTOLIC BLOOD PRESSURE: 85 MMHG | SYSTOLIC BLOOD PRESSURE: 127 MMHG | OXYGEN SATURATION: 98 % | HEART RATE: 104 BPM

## 2021-07-09 DIAGNOSIS — G43.719 CHRONIC MIGRAINE WITHOUT AURA, INTRACTABLE, WITHOUT STATUS MIGRAINOSUS: Primary | ICD-10-CM

## 2021-07-09 PROCEDURE — 64615 CHEMODENERV MUSC MIGRAINE: CPT | Mod: GC | Performed by: PHYSICAL MEDICINE & REHABILITATION

## 2021-07-09 RX ORDER — BUPIVACAINE HYDROCHLORIDE 2.5 MG/ML
4 INJECTION, SOLUTION EPIDURAL; INFILTRATION; INTRACAUDAL ONCE
Status: COMPLETED | OUTPATIENT
Start: 2021-07-09 | End: 2021-07-09

## 2021-07-09 RX ADMIN — BUPIVACAINE HYDROCHLORIDE 4 ML: 2.5 INJECTION, SOLUTION EPIDURAL; INFILTRATION; INTRACAUDAL at 12:31

## 2021-07-09 ASSESSMENT — PAIN SCALES - GENERAL: PAINLEVEL: MODERATE PAIN (4)

## 2021-07-09 NOTE — PROGRESS NOTES
BOTULINUM TOXIN PROCEDURE - HEADACHE - NOTE    Chief Complaint   Patient presents with     Botox     [facility] Return Botox     /85   Pulse 104   Resp 16   SpO2 98%      Current Outpatient Medications:      albuterol (PROAIR HFA/PROVENTIL HFA/VENTOLIN HFA) 108 (90 Base) MCG/ACT inhaler, Inhale 2 puffs into the lungs every 6 hours as needed for shortness of breath / dyspnea or wheezing, Disp: 1 Inhaler, Rfl: 1     amitriptyline (ELAVIL) 25 MG tablet, TAKE 1 TABLET BY MOUTH EVERY NIGHT AT BEDTIME, Disp: 90 tablet, Rfl: 1     Apremilast (OTEZLA) 10 & 20 & 30 MG TBPK, Take by mouth according to the instructions on the packet. Hold for signs of infection,any GI upset, weight loss or depression concerns, and seek medical attention., Disp: 1 each, Rfl: 0     apremilast (OTEZLA) 30 MG tablet, Take 1 tablet (30 mg) by mouth 2 times daily Hold for signs of infection, and seek medical attention., Disp: 180 tablet, Rfl: 3     artificial tears OINT ophthalmic ointment, 0.5 inch strip each eye at night, Disp: 1 Tube, Rfl: 11     benzocaine (TOPICALE XTRA) 20 % GEL, Apply as needed locally to mouth or nasal ulcers for pain; 4 times daily as needed, Disp: 30 g, Rfl: 1     betamethasone valerate (VALISONE) 0.1 % cream, Apply topically 2 times daily as needed , Disp: , Rfl:      bisacodyl (DULCOLAX) 5 MG EC tablet, Take 2 tablets (10 mg) by mouth daily as needed for constipation, Disp: 30 tablet, Rfl: 0     citalopram (CELEXA) 20 MG tablet, Take 1 tablet (20 mg) by mouth daily, Disp: 90 tablet, Rfl: 1     colchicine (COLCYRS) 0.6 MG tablet, TAKE 1 TABLET (0.6 MG) BY MOUTH 2 TIMES DAILY, Disp: 180 tablet, Rfl: 1     cyproheptadine (PERIACTIN) 4 MG tablet, TAKE 2 TABLETS (8 MG) BY MOUTH AT BEDTIME FOR NIGHTMARES, Disp: 180 tablet, Rfl: 1     dexamethasone (DECADRON) 4 MG/ML injection, To be used by therapist during PT sessions., Disp: 30 mL, Rfl: 0     diclofenac (VOLTAREN) 75 MG EC tablet, Take 1 tablet (75 mg) by mouth 2 times  daily as needed for moderate pain (Patient not taking: Reported on 6/15/2021), Disp: 60 tablet, Rfl: 1     dicyclomine (BENTYL) 20 MG tablet, Take 1 tablet (20 mg) by mouth 4 times daily as needed, Disp: 120 tablet, Rfl: 3     diphenhydrAMINE (BENADRYL) 25 MG tablet, Take 1 tablet (25 mg) by mouth 3 times daily as needed (itching), Disp: 40 tablet, Rfl: 1     EPINEPHrine (EPIPEN 2-EAMON) 0.3 MG/0.3ML injection, Inject 0.3 mLs (0.3 mg) into the muscle as needed for anaphylaxis, Disp: 0.6 mL, Rfl: 3     furosemide (LASIX) 20 MG tablet, Take 1 tablet (20 mg) by mouth 3 times daily, Disp: 60 tablet, Rfl: 3     gabapentin (NEURONTIN) 300 MG capsule, TAKE ONE CAPSULE BY MOUTH THREE TIMES DAILY , Disp: 90 capsule, Rfl: 0     guanFACINE (TENEX) 2 MG tablet, Take one and one half tabs in the morning and one and one half in the evening. (Patient not taking: Reported on 6/15/2021), Disp: 270 tablet, Rfl: 3     hypromellose (GENTEAL) 0.3 % SOLN, 1 drop every hour as needed for dry eyes , Disp: , Rfl:      lactulose 20 GM/30ML SOLN, Take 30 mLs by mouth 3 times daily as needed (for constipation), Disp: 300 mL, Rfl: 3     lidocaine (LMX4) 4 % external cream, Apply topically once as needed for mild pain, Disp: 120 g, Rfl: 1     lidocaine (LMX4) 4 % external cream, Apply 1 Application topically once as needed for mild pain, Disp: , Rfl:      LINZESS 290 MCG capsule, TAKE 1 CAPSULE BY MOUTH EVERY DAY IN THE MORNING BEFORE BREAKFAST, Disp: 90 capsule, Rfl: 0     LORazepam (ATIVAN) 0.5 MG tablet, TAKE 1 TABLET BY MOUTH EVERY 6 HOURS AS NEEDED FOR ANXIETY, Disp: 10 tablet, Rfl: 0     ondansetron (ZOFRAN-ODT) 8 MG ODT tab, Take 1 tablet (8 mg) by mouth every 8 hours as needed for nausea, Disp: 180 tablet, Rfl: 1     order for DME, Equipment being ordered: Trilok brace 8 1/2 left lower extremity (Patient not taking: Reported on 6/15/2021), Disp: 1 Device, Rfl: 0     order for DME, Equipment being ordered: size 8 1/2 walking boot tall  (Patient not taking: Reported on 6/15/2021), Disp: 1 Device, Rfl: 0     polyethylene glycol (MIRALAX/GLYCOLAX) powder, Take 1 capful by mouth 3 times daily, Disp: , Rfl:      sodium fluoride (DENTA 5000 PLUS) 1.1 % CREA, Apply 1 Application topically daily, Disp: , Rfl:      sucralfate (CARAFATE) 1 GM/10ML suspension, Take 10 mLs (1 g) by mouth 4 times daily, Disp: 1200 mL, Rfl: 2     triamcinolone (KENALOG) 0.5 % external ointment, Apply 1 g topically 3 times daily as needed for irritation (Patient not taking: Reported on 6/15/2021), Disp: 15 g, Rfl: 3    Current Facility-Administered Medications:      botulinum toxin type A (BOTOX) 100 units injection 200 Units, 200 Units, Intramuscular, Q90 Days, StandAlejandrina bullard MD     lidocaine 1 % injection 0.5 mL, 0.5 mL, , , Andres Johnson, DPM, 0.5 mL at 06/15/21 0957     lidocaine 1 % injection 0.5 mL, 0.5 mL, , , Andres Johnson, DPM, 0.5 mL at 09/15/20 1554     methylPREDNISolone (DEPO-MEDROL) injection 40 mg, 40 mg, , , Yeo, Albert, MD, 40 mg at 09/29/20 1154     triamcinolone (KENALOG-40) injection 40 mg, 40 mg, , , Andres Johnson, DPM, 40 mg at 06/15/21 0957     triamcinolone (KENALOG-40) injection 40 mg, 40 mg, , , Andres Johnson, DPM, 40 mg at 09/15/20 1554     Allergies   Allergen Reactions     Amoxil [Penicillins] Rash     Dad unsure of reaction.     Bee Venom Anaphylaxis     Bioflavonoids Anaphylaxis     Citrus Anaphylaxis     All Hunker     Contrast Dye Rash     [place]rast [place] [place] [facility] [place]  NOTE: this is a contrast media oral with iodine. Premedicate with methylpred standard for IV contrast, request barium contrast for oral contrast.     Diagnostic X-Ray Materials Hives and Rash     [facility]rast [employer] [place] Contrast [facility] [place].  NOTE: this is a contrast media oral with iodine. Premedicate with methylpred standard for IV contrast, request  barium contrast for oral contrast.     Pineapple Anaphylaxis, Difficulty breathing and Rash     Reglan [Metoclopramide] Other (See Comments)     IV dose only, in ER, rapid heart rate.     Ace Inhibitors      Difficulty in breathing and GI upset     Amitiza [Lubiprostone] Nausea and Vomiting     Amoxicillin-Pot Clavulanate      Midazolam Unknown     parent states that when pt takes this medication, she wakes up being very violent .     Other [No Clinical Screening - See Comments]      Bleech/ chest tightness, itchy throat, swollen tongue, hives     Tizanidine Other (See Comments)     Confusion, back pain, photophobia, abdominal pain, shaking, anxious       Versed      Coming out of pelvic exam at age of 5, was kicking and screaming when coming out of the versed.     Adhesive Tape Rash     Azithromycin Hives and Rash     Cephalexin Itching and Rash        PHYSICAL EXAM:  Ongoing headache left side 3/10  No facial asymmetry  No evidence for skin infections in head and neck    HPI:  Patient reports the following new medical problems since last visit: ED visit for accidental overdose in the setting of medication changes.  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     Patient reports worsening R shoulder tightness after previous injection. Headaches better overall especially after injection, then recurrent over past month.     We reviewed the recommended safety guidelines for  Botox from any vaccine injection, such as the seasonal flu vaccine, by a minimum of 10-14 days with Samara Oropeza. She acknowledged understanding.      RESPONSE TO PREVIOUS TREATMENT:  Change in headache pattern following last series of injections with 200 units of  Botox on 1/13/2021 delivered to shoulder and neck muscles.    Post-procedural headache: Rated as 'Mild' severity.  Duration: 3 days then completely resolved    1.  Headache frequency during this injection cycle:  Patient  reports 4 headache days last month, no headaches the first 2 months after injection. This past week she has had 3 headache days.  This is compared to her baseline headache frequency of up to 30 headache days per month.    2.  Headache duration during this injection cycle:  Headache duration ranged from 2-3 days.  Patient reports 2 episodes of multiple day headaches during this injection cycle.    3.  Headache intensity during this injection cycle:    A.  3-4/10  =  Typical pain level.    B.  10/10  =  Worst pain level.  C.  0/10  =  Lowest pain level.    4.  Change in headache medication usage during this injection cycle:  (For Example:  Able to decrease use of oral pain medications.) No new medication changes, still taking Excedrin occasionally    5.  ER Visits During This Injection Cycle:  None      6.  Functional Performance:  Change in ADL's, social interaction, days lost from work, etc. Light sensitivity. Patient holding off Cosmetology until R elbow bursa surgery.    BOTULINUM NEUROTOXIN INJECTION PROCEDURES:    VERIFICATION OF PATIENT IDENTIFICATION AND PROCEDURE     Initials   Patient Name oca   Patient  oca   Procedure Verified by: ocbeth     Prior to the start of the procedure and with procedural staff participation, I verbally confirmed the patient s identity using two indicators, relevant allergies, that the procedure was appropriate and matched the consent or emergent situation, and that the correct equipment/implants were available. Immediately prior to starting the procedure I conducted the Time Out with the procedural staff and re-confirmed the patient s name, procedure, and site/side. (The Joint Commission universal protocol was followed.)  Yes    Sedation (Moderate or Deep): None    Above assessments performed by:  Tejal Winn MD    The attending provider was present for the entire procedure documented below.    Alejandrina Hernandez MD       INDICATIONS FOR PROCEDURES:  Samara QUICK  Sandip is a 25 year old patient with a complex medical history that includes chronic migraine headaches associated with cervicogenic components.      Her baseline symptoms have been recalcitrant to oral medications and conservative therapy. She is here today for re-evaluation and it was decided to reinject with Botox.     GOAL OF PROCEDURE:  The goal of this procedure is to decrease pain.    TOTAL DOSE: 200 UNITS BOTOX  Dose Administered:  200 units  Botox (Botulinum Toxin Type A)       2:1 Dilution   Diluent Used:  0.25% Sensorcaine  (NDC: 55150-167-10,  Batch: [id number],  Exp: 10/2023)  Total Volume of Diluent Used:  4 ml  Lot # [id number] with Expiration Date:  10/2023  NDC #: Botox 100u ([address]-92)     Medication guide was offered to patient and was declined.    CONSENT:  The risks, benefits, and treatment options were discussed with Samara Oropeza and she agreed to proceed.    Written consent was obtained by [facility].     EQUIPMENT USED:  Needle-30 gauge  25-mm EMG needle  EMG    SKIN PREPARATION:  Skin preparation was performed using an alcohol wipe.     GUIDANCE DESCRIPTION:  Electro-myographic guidance was necessary throughout the shoulder and neck muscles to accurately identify all areas of dystonic muscles while avoiding injection of non-dystonic muscles and non-spastic muscles, neighboring nerves and nearby vascular structures.     AREA/MUSCLE INJECTED:  200 UNITS BOTOX = TOTAL DOSE     1 & 2. SHOULDER GIRDLE & NECK MUSCLES: 50 units Botox = Total Dose, 2:1 Dilution      Right Splenius Cervicis - 5 units of Botox over 2 site/s.   Left Splenius Cervicis - 5 units of Botox over 2 site/s.     Right Rectus Capitis - 2.5 units of Botox at 1 site.  Left Rectus Capitis - 2.5 units of Botox at 1site.     Right Lateral Trapezius - 5 units of Botox over 2 sites/s.  Left Lateral Trapezius - 5 units of Botox over 2 site/s.      Right Levator Scapulae - 10 units of Botox over 2 site/s.   Left Levator Scapulae - 10  units of Botox over 2 site/s.    Right Sternocleidomastoid - 5 units of Botox over 1 site/s.     3. HEAD & SCALP MUSCLES: 150 units Botox = Total Dose, 2:1 Dilution     Right Occipitalis - 5 units of Botox at 1 site/s.   Left Occipitalis - 5 units of Botox at 1 site/s.     Right Frontalis - 20 units of Botox over 4 site/s.  Left Frontalis - 20 units of Botox over 4 site/s.     Right Temporalis - 45 units of Botox over 8 site/s.  Left Temporalis - 45 units of Botox over 8 site/s.     Right  - 2.5 units of Botox at 1 site/s.              Left  - 2.5 units of Botox at 1 site/s.                 Procerus - 5 units of Botox at 1 site/s.      RESPONSE TO PROCEDURE:  Samara Oropeza tolerated the procedure well and there were no immediate complications.   She was allowed to recover for an appropriate period of time and was discharged home in stable condition.    FOLLOW UP:  Samara Oropeza was asked to follow up by phone in 7-14 days with Sharmila Lynn PT to report her response to this series of injections.  Based on the patient's previous response to this therapy, Samara Oropeza was rescheduled for the next series of injections in 12 weeks.    PLAN (Medication Changes, Therapy Orders, Work or Disability Issues, etc.):  Dose of Botox remained the same today, but distribution changed to include muscles on her SCM and increased dose in bilateral levator scapula muscles based on symptoms. Patient will continue to monitor response to today's injections.

## 2021-07-09 NOTE — LETTER
7/9/2021       RE: Samara Oropeza  [address]  [address]  [facility] [id number]     Dear Colleague,    Thank you for referring your patient, Samara Oropeza, to the [facility] [place] at [facility]. Please see a copy of my visit note below.    BOTULINUM TOXIN PROCEDURE - HEADACHE - NOTE    Chief Complaint   Patient presents with     Botox     UMP Return Botox     /85   Pulse 104   Resp 16   SpO2 98%      Current Outpatient Medications:      albuterol (PROAIR HFA/PROVENTIL HFA/VENTOLIN HFA) 108 (90 Base) MCG/ACT inhaler, Inhale 2 puffs into the lungs every 6 hours as needed for shortness of breath / dyspnea or wheezing, Disp: 1 Inhaler, Rfl: 1     amitriptyline (ELAVIL) 25 MG tablet, TAKE 1 TABLET BY MOUTH EVERY NIGHT AT BEDTIME, Disp: 90 tablet, Rfl: 1     Apremilast (OTEZLA) 10 & 20 & 30 MG TBPK, Take by mouth according to the instructions on the packet. Hold for signs of infection,any GI upset, weight loss or depression concerns, and seek medical attention., Disp: 1 each, Rfl: 0     apremilast (OTEZLA) 30 MG tablet, Take 1 tablet (30 mg) by mouth 2 times daily Hold for signs of infection, and seek medical attention., Disp: 180 tablet, Rfl: 3     artificial tears OINT ophthalmic ointment, 0.5 inch strip each eye at night, Disp: 1 Tube, Rfl: 11     benzocaine (TOPICALE XTRA) 20 % GEL, Apply as needed locally to mouth or nasal ulcers for pain; 4 times daily as needed, Disp: 30 g, Rfl: 1     betamethasone valerate (VALISONE) 0.1 % cream, Apply topically 2 times daily as needed , Disp: , Rfl:      bisacodyl (DULCOLAX) 5 MG EC tablet, Take 2 tablets (10 mg) by mouth daily as needed for constipation, Disp: 30 tablet, Rfl: 0     citalopram (CELEXA) 20 MG tablet, Take 1 tablet (20 mg) by mouth daily, Disp: 90 tablet, Rfl: 1     colchicine (COLCYRS) 0.6 MG tablet, TAKE 1 TABLET (0.6 MG)  BY MOUTH 2 TIMES DAILY, Disp: 180 tablet, Rfl: 1     cyproheptadine (PERIACTIN) 4 MG tablet, TAKE 2 TABLETS (8 MG) BY MOUTH AT BEDTIME FOR NIGHTMARES, Disp: 180 tablet, Rfl: 1     dexamethasone (DECADRON) 4 MG/ML injection, To be used by therapist during PT sessions., Disp: 30 mL, Rfl: 0     diclofenac (VOLTAREN) 75 MG EC tablet, Take 1 tablet (75 mg) by mouth 2 times daily as needed for moderate pain (Patient not taking: Reported on 6/15/2021), Disp: 60 tablet, Rfl: 1     dicyclomine (BENTYL) 20 MG tablet, Take 1 tablet (20 mg) by mouth 4 times daily as needed, Disp: 120 tablet, Rfl: 3     diphenhydrAMINE (BENADRYL) 25 MG tablet, Take 1 tablet (25 mg) by mouth 3 times daily as needed (itching), Disp: 40 tablet, Rfl: 1     EPINEPHrine (EPIPEN 2-EAMON) 0.3 MG/0.3ML injection, Inject 0.3 mLs (0.3 mg) into the muscle as needed for anaphylaxis, Disp: 0.6 mL, Rfl: 3     furosemide (LASIX) 20 MG tablet, Take 1 tablet (20 mg) by mouth 3 times daily, Disp: 60 tablet, Rfl: 3     gabapentin (NEURONTIN) 300 MG capsule, TAKE ONE CAPSULE BY MOUTH THREE TIMES DAILY , Disp: 90 capsule, Rfl: 0     guanFACINE (TENEX) 2 MG tablet, Take one and one half tabs in the morning and one and one half in the evening. (Patient not taking: Reported on 6/15/2021), Disp: 270 tablet, Rfl: 3     hypromellose (GENTEAL) 0.3 % SOLN, 1 drop every hour as needed for dry eyes , Disp: , Rfl:      lactulose 20 GM/30ML SOLN, Take 30 mLs by mouth 3 times daily as needed (for constipation), Disp: 300 mL, Rfl: 3     lidocaine (LMX4) 4 % external cream, Apply topically once as needed for mild pain, Disp: 120 g, Rfl: 1     lidocaine (LMX4) 4 % external cream, Apply 1 Application topically once as needed for mild pain, Disp: , Rfl:      LINZESS 290 MCG capsule, TAKE 1 CAPSULE BY MOUTH EVERY DAY IN THE MORNING BEFORE BREAKFAST, Disp: 90 capsule, Rfl: 0     LORazepam (ATIVAN) 0.5 MG tablet, TAKE 1 TABLET BY MOUTH EVERY 6 HOURS AS NEEDED FOR ANXIETY, Disp: 10 tablet,  Rfl: 0     ondansetron (ZOFRAN-ODT) 8 MG ODT tab, Take 1 tablet (8 mg) by mouth every 8 hours as needed for nausea, Disp: 180 tablet, Rfl: 1     order for DME, Equipment being ordered: Trilok brace 8 1/2 left lower extremity (Patient not taking: Reported on 6/15/2021), Disp: 1 Device, Rfl: 0     order for DME, Equipment being ordered: size 8 1/2 walking boot tall (Patient not taking: Reported on 6/15/2021), Disp: 1 Device, Rfl: 0     polyethylene glycol (MIRALAX/GLYCOLAX) powder, Take 1 capful by mouth 3 times daily, Disp: , Rfl:      sodium fluoride (DENTA 5000 PLUS) 1.1 % CREA, Apply 1 Application topically daily, Disp: , Rfl:      sucralfate (CARAFATE) 1 GM/10ML suspension, Take 10 mLs (1 g) by mouth 4 times daily, Disp: 1200 mL, Rfl: 2     triamcinolone (KENALOG) 0.5 % external ointment, Apply 1 g topically 3 times daily as needed for irritation (Patient not taking: Reported on 6/15/2021), Disp: 15 g, Rfl: 3    Current Facility-Administered Medications:      botulinum toxin type A (BOTOX) 100 units injection 200 Units, 200 Units, Intramuscular, Q90 Days, StandalAlejandrina MD     lidocaine 1 % injection 0.5 mL, 0.5 mL, , , Andres Johnson DPPB, 0.5 mL at 06/15/21 0957     lidocaine 1 % injection 0.5 mL, 0.5 mL, , , Andres Johnson DPM, 0.5 mL at 09/15/20 1554     methylPREDNISolone (DEPO-MEDROL) injection 40 mg, 40 mg, , , Yeo, Albert, MD, 40 mg at 09/29/20 1154     triamcinolone (KENALOG-40) injection 40 mg, 40 mg, , , Andres Johnson DPM, 40 mg at 06/15/21 0957     triamcinolone (KENALOG-40) injection 40 mg, 40 mg, , , Andres Johnson DPM, 40 mg at 09/15/20 1554     Allergies   Allergen Reactions     Amoxil [Penicillins] Rash     Dad unsure of reaction.     Bee Venom Anaphylaxis     Bioflavonoids Anaphylaxis     Citrus Anaphylaxis     All [place]     Contrast Dye Rash     Contrast Media Ready-Box [facility], 04/09/2014.; Contrast Media Ready-Box [facility], 04/09/2014.  NOTE: this is a contrast  media oral with iodine. Premedicate with methylpred standard for IV contrast, request barium contrast for oral contrast.     Diagnostic X-Ray Materials Hives and Rash     Contrast Media Ready-Box [facility] 04/09/2014.; Contrast Media Ready-Box [facility] 04/09/2014.  NOTE: this is a contrast media oral with iodine. Premedicate with methylpred standard for IV contrast, request barium contrast for oral contrast.     Pineapple Anaphylaxis, Difficulty breathing and Rash     Reglan [Metoclopramide] Other (See Comments)     IV dose only, in ER, rapid heart rate.     Ace Inhibitors      Difficulty in breathing and GI upset     Amitiza [Lubiprostone] Nausea and Vomiting     Amoxicillin-Pot Clavulanate      Midazolam Unknown     parent states that when pt takes this medication, she wakes up being very violent .     Other [No Clinical Screening - See Comments]      Bleech/ chest tightness, itchy throat, swollen tongue, hives     Tizanidine Other (See Comments)     Confusion, back pain, photophobia, abdominal pain, shaking, anxious       Versed      Coming out of pelvic exam at age of 5, was kicking and screaming when coming out of the versed.     Adhesive Tape Rash     Azithromycin Hives and Rash     Cephalexin Itching and Rash        PHYSICAL EXAM:  Ongoing headache left side 3/10  No facial asymmetry  No evidence for skin infections in head and neck    HPI:  Patient reports the following new medical problems since last visit: ED visit for accidental overdose in the setting of medication changes.  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     Patient reports worsening R shoulder tightness after previous injection. Headaches better overall especially after injection, then recurrent over past month.     We reviewed the recommended safety guidelines for  Botox from any vaccine injection, such as the seasonal flu vaccine, by a minimum of 10-14 days with Samara Oropeza. She  acknowledged understanding.      RESPONSE TO PREVIOUS TREATMENT:  Change in headache pattern following last series of injections with 200 units of  Botox on 2021 delivered to shoulder and neck muscles.    Post-procedural headache: Rated as 'Mild' severity.  Duration: 3 days then completely resolved    1.  Headache frequency during this injection cycle:  Patient reports 4 headache days last month, no headaches the first 2 months after injection. This past week she has had 3 headache days.  This is compared to her baseline headache frequency of up to 30 headache days per month.    2.  Headache duration during this injection cycle:  Headache duration ranged from 2-3 days.  Patient reports 2 episodes of multiple day headaches during this injection cycle.    3.  Headache intensity during this injection cycle:    A.  3-4/10  =  Typical pain level.    B.  10/10  =  Worst pain level.  C.  0/10  =  Lowest pain level.    4.  Change in headache medication usage during this injection cycle:  (For Example:  Able to decrease use of oral pain medications.) No new medication changes, still taking Excedrin occasionally    5.  ER Visits During This Injection Cycle:  None      6.  Functional Performance:  Change in ADL's, social interaction, days lost from work, etc. Light sensitivity. Patient holding off Cosmetology until R elbow bursa surgery.    BOTULINUM NEUROTOXIN INJECTION PROCEDURES:    VERIFICATION OF PATIENT IDENTIFICATION AND PROCEDURE     Initials   Patient Name oca   Patient  oca   Procedure Verified by: ocbeth     Prior to the start of the procedure and with procedural staff participation, I verbally confirmed the patient s identity using two indicators, relevant allergies, that the procedure was appropriate and matched the consent or emergent situation, and that the correct equipment/implants were available. Immediately prior to starting the procedure I conducted the Time Out with the procedural staff and  re-confirmed the patient s name, procedure, and site/side. (The Joint Commission universal protocol was followed.)  Yes    Sedation (Moderate or Deep): None    Above assessments performed by:  Tejal Winn MD    The attending provider was present for the entire procedure documented below.    Alejandrina Hernandez MD       INDICATIONS FOR PROCEDURES:  Samara Oropeza is a 25 year old patient with a complex medical history that includes chronic migraine headaches associated with cervicogenic components.      Her baseline symptoms have been recalcitrant to oral medications and conservative therapy. She is here today for re-evaluation and it was decided to reinject with Botox.     GOAL OF PROCEDURE:  The goal of this procedure is to decrease pain.    TOTAL DOSE: 200 UNITS BOTOX  Dose Administered:  200 units  Botox (Botulinum Toxin Type A)       2:1 Dilution   Diluent Used:  0.25% Sensorcaine  (NDC: 55150-167-10,  Batch: [id number],  Exp: 10/2023)  Total Volume of Diluent Used:  4 ml  Lot # [id number] with Expiration Date:  10/2023  NDC #: Botox 100u ([address]-35)     Medication guide was offered to patient and was declined.    CONSENT:  The risks, benefits, and treatment options were discussed with Samara Oropeza and she agreed to proceed.    Written consent was obtained by [facility].     EQUIPMENT USED:  Needle-30 gauge  25-mm EMG needle  EMG    SKIN PREPARATION:  Skin preparation was performed using an alcohol wipe.     GUIDANCE DESCRIPTION:  Electro-myographic guidance was necessary throughout the shoulder and neck muscles to accurately identify all areas of dystonic muscles while avoiding injection of non-dystonic muscles and non-spastic muscles, neighboring nerves and nearby vascular structures.     AREA/MUSCLE INJECTED:  200 UNITS BOTOX = TOTAL DOSE     1 & 2. SHOULDER GIRDLE & NECK MUSCLES: 50 units Botox = Total Dose, 2:1 Dilution      Right Splenius Cervicis - 5 units of Botox over 2 site/s.    Left Splenius Cervicis - 5 units of Botox over 2 site/s.     Right Rectus Capitis - 2.5 units of Botox at 1 site.  Left Rectus Capitis - 2.5 units of Botox at 1site.     Right Lateral Trapezius - 5 units of Botox over 2 sites/s.  Left Lateral Trapezius - 5 units of Botox over 2 site/s.      Right Levator Scapulae - 10 units of Botox over 2 site/s.   Left Levator Scapulae - 10 units of Botox over 2 site/s.    Right Sternocleidomastoid - 5 units of Botox over 1 site/s.     3. HEAD & SCALP MUSCLES: 150 units Botox = Total Dose, 2:1 Dilution     Right Occipitalis - 5 units of Botox at 1 site/s.   Left Occipitalis - 5 units of Botox at 1 site/s.     Right Frontalis - 20 units of Botox over 4 site/s.  Left Frontalis - 20 units of Botox over 4 site/s.     Right Temporalis - 45 units of Botox over 8 site/s.  Left Temporalis - 45 units of Botox over 8 site/s.     Right  - 2.5 units of Botox at 1 site/s.              Left  - 2.5 units of Botox at 1 site/s.                 Procerus - 5 units of Botox at 1 site/s.      RESPONSE TO PROCEDURE:  Samara Oropeza tolerated the procedure well and there were no immediate complications.   She was allowed to recover for an appropriate period of time and was discharged home in stable condition.    FOLLOW UP:  Samara Oropeza was asked to follow up by phone in 7-14 days with Sharmila Lynn PT to report her response to this series of injections.  Based on the patient's previous response to this therapy, Samara Oropeza was rescheduled for the next series of injections in 12 weeks.    PLAN (Medication Changes, Therapy Orders, Work or Disability Issues, etc.):  Dose of Botox remained the same today, but distribution changed to include muscles on her SCM and increased dose in bilateral levator scapula muscles based on symptoms. Patient will continue to monitor response to today's injections.       Again, thank you for allowing me to participate in the care of  your patient.      Sincerely,    Alejandrina Hernandez MD

## 2021-07-27 NOTE — PLAN OF CARE
Patient discharged to home with significant other at 1320 this afternoon. All discharge instructions reviewed with patient, all belongings sent, and patient directed to discharge pharmacy for medications. Patient in agreement with discharge plan of care. Home infusion resumed for Vanco infusions.    Statement Selected

## 2021-08-06 ENCOUNTER — HOSPITAL ENCOUNTER (EMERGENCY)
Facility: CLINIC | Age: 26
Discharge: HOME | End: 2021-08-06
Attending: EMERGENCY MEDICINE | Admitting: EMERGENCY MEDICINE
Payer: COMMERCIAL

## 2021-08-06 VITALS
TEMPERATURE: 97.8 F | RESPIRATION RATE: 12 BRPM | OXYGEN SATURATION: 99 % | HEART RATE: 110 BPM | SYSTOLIC BLOOD PRESSURE: 118 MMHG | DIASTOLIC BLOOD PRESSURE: 80 MMHG

## 2021-08-06 DIAGNOSIS — T78.2XXA ANAPHYLAXIS, INITIAL ENCOUNTER: ICD-10-CM

## 2021-08-06 PROCEDURE — 96372 THER/PROPH/DIAG INJ SC/IM: CPT | Performed by: EMERGENCY MEDICINE

## 2021-08-06 PROCEDURE — 258N000003 HC RX IP 258 OP 636: Performed by: EMERGENCY MEDICINE

## 2021-08-06 PROCEDURE — 96374 THER/PROPH/DIAG INJ IV PUSH: CPT

## 2021-08-06 PROCEDURE — 99284 EMERGENCY DEPT VISIT MOD MDM: CPT | Mod: 25

## 2021-08-06 PROCEDURE — 96361 HYDRATE IV INFUSION ADD-ON: CPT

## 2021-08-06 PROCEDURE — 250N000009 HC RX 250: Performed by: EMERGENCY MEDICINE

## 2021-08-06 PROCEDURE — 96375 TX/PRO/DX INJ NEW DRUG ADDON: CPT

## 2021-08-06 PROCEDURE — 250N000011 HC RX IP 250 OP 636: Performed by: EMERGENCY MEDICINE

## 2021-08-06 RX ORDER — METHYLPREDNISOLONE SODIUM SUCCINATE 125 MG/2ML
125 INJECTION, POWDER, LYOPHILIZED, FOR SOLUTION INTRAMUSCULAR; INTRAVENOUS ONCE
Status: COMPLETED | OUTPATIENT
Start: 2021-08-06 | End: 2021-08-06

## 2021-08-06 RX ORDER — EPINEPHRINE 1 MG/ML
0.3 INJECTION, SOLUTION, CONCENTRATE INTRAVENOUS ONCE
Status: COMPLETED | OUTPATIENT
Start: 2021-08-06 | End: 2021-08-06

## 2021-08-06 RX ORDER — PREDNISONE 20 MG/1
TABLET ORAL
Qty: 8 TABLET | Refills: 0 | Status: SHIPPED | OUTPATIENT
Start: 2021-08-07 | End: 2022-08-11

## 2021-08-06 RX ADMIN — FAMOTIDINE 20 MG: 10 INJECTION INTRAVENOUS at 21:32

## 2021-08-06 RX ADMIN — METHYLPREDNISOLONE SODIUM SUCCINATE 125 MG: 125 INJECTION, POWDER, FOR SOLUTION INTRAMUSCULAR; INTRAVENOUS at 21:30

## 2021-08-06 RX ADMIN — SODIUM CHLORIDE 1000 ML: 9 INJECTION, SOLUTION INTRAVENOUS at 21:36

## 2021-08-06 RX ADMIN — EPINEPHRINE 0.3 MG: 1 INJECTION INTRAMUSCULAR; INTRAVENOUS; SUBCUTANEOUS at 21:27

## 2021-08-06 ASSESSMENT — ENCOUNTER SYMPTOMS
TROUBLE SWALLOWING: 1
NAUSEA: 1

## 2021-08-07 NOTE — ED TRIAGE NOTES
Ate food containing citrus around 1730.  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  Took 2 x 25 mg benadryl at 1800, 1900 and 3 at 2000.  Pt lethargic in triage.  No other meds taken.  Reports swallowing complaints and rash.

## 2021-08-07 NOTE — ED PROVIDER NOTES
History   Chief Complaint:  Allergic Reaction     The history is provided by the patient and a parent.      Samara Oropeza is a 25 year old female with history of Behcet's disease, GERD and migraines who presents with an allergic reaction. Samara reports that at around 1800 she unknowingly ate something with citrus in it. She states she has known anaphylaxis to citrus and she had a syncopal episode 20 minutes after she ate the citrus. She endorses trouble swallowing, feeling nauseous and tired and a rash. She notes she took 50 mg Benadryl at 1800, 50 mg Benadryl at 1900 and 75 mg at 2000. She did not use her Epipen.    Review of Systems   HENT: Positive for trouble swallowing.    Gastrointestinal: Positive for nausea.   Skin: Positive for rash.   Neurological: Positive for syncope.   All other systems reviewed and are negative.    Allergies:  Amoxil [Penicillins]  Bioflavonoids  Contrast Dye  Diagnostic X-Ray Materials  Reglan [Metoclopramide]  Ace Inhibitors  Amitiza [Lubiprostone]  Amoxicillin-Pot Clavulanate  Midazolam  Tizanidine  Versed  Azithromycin  Cephalexin    Medications:  Aspirin 81 mg  Atarax   Albuterol inhaler   Elavil  Otezla   Celexa   Colcyrs   Periactin  Voltaren  Epipen  Neurontin  Linzess  Ativan  Zofran-ODT  Benadryl  Botox     Past Medical History:    Behcet's disease   GERD  Migraines  Neuromuscular disorder  Tourette's  Seizures    Past Surgical History:    I&D x2  PICC insertion    Family History:    Depression  Migraines  HTN    Social History:  Presents with dad    Physical Exam     Patient Vitals for the past 24 hrs:   BP Temp Pulse Resp SpO2   08/06/21 2315 118/80 -- 110 12 99 %   08/06/21 2300 94/73 -- 109 11 99 %   08/06/21 2245 -- -- 115 22 100 %   08/06/21 2230 111/66 -- 112 15 100 %   08/06/21 2215 111/68 -- 109 15 100 %   08/06/21 2200 130/83 -- 106 16 100 %   08/06/21 2145 124/84 -- 103 11 98 %   08/06/21 2130 (!) 134/96 -- 115 14 100 %   08/06/21 2116 (!) 130/98 97.8   F (36.6  C) 105 18 99 %       Physical Exam  Constitutional: Vital signs reviewed as above.   Head: No external signs of trauma. No lesions noted.  Eyes: PEERL, EOMI B/L, no pain or limitation of superior gaze  ENT:   Nose: Noncongested, no exudates. No rhinorrhea. No FB noted   Mouth/Throat:     Mucous membranes are moist and normal.     No Oropharyngeal exudate. No oropharyngeal erythema noted.    No tonsilar swelling noted.     No uvular deviation noted.    No swelling noted on the floor of the mouth  Neck: FROM. Neck is supple. No stridor noted.  Cardiovascular: Tachycardic rate, regular rhythm and normal heart sounds.  No murmur heard. Equal B/L peripheral pulses.  Pulmonary/Chest: Effort normal and breath sounds normal. No respiratory distress. Patient has no wheezes. Patient has no rales.   Gastrointestinal: Soft. There is no tenderness.   Musculoskeletal/Extremities: No edema noted. Normal tone.  Neurological: Patient is alert and oriented to person, place, and time.   Skin: Skin is warm and dry. There is no diaphoresis noted.   Psychiatric: The patient appears calm.    Emergency Department Course   Emergency Department Course:    Reviewed:  I reviewed nursing notes, vitals, past medical history and care everywhere    Assessments:  ED Course as of Aug 06 2321   Fri Aug 06, 2021   2133 I obtained history and examined the patient as noted above.      2315 Rechecked and updated.  Patient feels well.  No shortness of breath, no throat tightness.  Lungs are clear and no stridor is noted.  Patient is still slightly tachycardic but this is likely due to the Benadryl that she took.          Interventions:  2127 Adrenalin, 0.3 mg, IM  2130 solu-Medrol, 125 mg, IV  2132 Pepcid, 20 mg, IV  2136 NS, 1 L, IV    Disposition:  Discharged    Impression & Plan   CMS Diagnoses: None    Medical Decision Making:  This 25-year-old female patient presents to the ED due to an allergic reaction.  Please see the HPI and exam for  specifics.  Based on the patient's description of facial swelling, syncope, GI symptoms including nausea, and tightness in her throat, she almost assuredly had an anaphylactic reaction.  Fortunately, the patient has remained well in the ED.  She did take 175 mg of Benadryl over the course of about 3 hours.  She is still slightly tachycardic on recheck though she otherwise feels better.  I think she can be safely discharged but I will send her home with a prescription for prednisone.  She states that she has enough EpiPen's.  In the future I encouraged her to use those first at the sign of an anaphylactic reaction.  Those can be truly lifesaving.  I gave her dosing recommendations on Benadryl as well.  I think she can otherwise be discharged and may follow-up in the outpatient setting.  She can return at anytime with new or worsening symptoms.  Anticipatory guidance given prior to discharge.    Diagnosis:    ICD-10-CM    1. Anaphylaxis, initial encounter  T78.2XXA        Discharge Medications:  New Prescriptions    PREDNISONE (DELTASONE) 20 MG TABLET    Take two tablets (= 40mg) each day for 4 (four) days       Scribe Disclosure:  I, Srinivas Miramontes, am serving as a scribe at 9:20 PM on 8/6/2021 to document services personally performed by Sebastien Gray DO based on my observations and the provider's statements to me.            Sebastien Gray DO  08/06/21 [id number]

## 2021-08-20 DIAGNOSIS — R60.1 GENERALIZED EDEMA: Primary | ICD-10-CM

## 2021-08-24 RX ORDER — FUROSEMIDE 40 MG
TABLET ORAL
Qty: 60 TABLET | Refills: 0 | Status: ON HOLD | OUTPATIENT
Start: 2021-08-24 | End: 2023-02-14

## 2021-08-24 NOTE — TELEPHONE ENCOUNTER
"Patient due for visit with PCP, will give one month marcell refill and send message via [employer] to patient regarding need to make apt for further refills.     Requested Prescriptions   Signed Prescriptions Disp Refills    furosemide (LASIX) 40 MG tablet 60 tablet 0     Sig: TAKE ONE TABLET BY MOUTH TWICE DAILY       Diuretics (Including Combos) Protocol Failed - 8/20/2021  1:15 PM        Failed - Recent (12 mo) or future (30 days) visit within the authorizing provider's specialty     Patient has had an office visit with the authorizing provider or a provider within the authorizing providers department within the previous 12 mos or has a future within next 30 days. See \"Patient Info\" tab in inbasket, or \"Choose Columns\" in Meds & Orders section of the refill encounter.              Passed - Blood pressure under 140/90 in past 12 months     BP Readings from Last 3 Encounters:   08/06/21 118/80   07/09/21 127/85   06/15/21 124/88                 Passed - Medication is active on med list        Passed - Patient is age 18 or older        Passed - No active pregancy on record        Passed - Normal serum creatinine on file in past 12 months     Recent Labs   Lab Test 09/07/20  1147   CR 0.79              Passed - Normal serum potassium on file in past 12 months     Recent Labs   Lab Test 09/07/20  1147   POTASSIUM 3.9                    Passed - Normal serum sodium on file in past 12 months     Recent Labs   Lab Test 09/07/20  1147                 Passed - No positive pregnancy test in past 12 months           Brandi Shirley RN  [facility]     "

## 2021-09-19 ENCOUNTER — HEALTH MAINTENANCE LETTER (OUTPATIENT)
Age: 26
End: 2021-09-19

## 2021-09-29 NOTE — DISCHARGE INSTRUCTIONS
Thank you for coming to the [facility] Emergency Department.     Continue to avoid foods that trigger symptoms. Consider avoiding strawberries in the future.     Take prednisone 40mg daily for 4 days, ranitidine 150mg twice daily for 4 days and benadryl 25mg every 6 hours for the next 12 hours, then as needed.     Return to the ER for:  Worsening swelling of the face, lips or tongue  Trouble breathing   Sski Pregnancy And Lactation Text: This medication is Pregnancy Category D and isn't considered safe during pregnancy. It is excreted in breast milk.

## 2021-10-05 ENCOUNTER — OFFICE VISIT (OUTPATIENT)
Dept: PHYSICAL MEDICINE AND REHAB | Facility: CLINIC | Age: 26
End: 2021-10-05
Payer: COMMERCIAL

## 2021-10-05 VITALS
TEMPERATURE: 99.1 F | HEART RATE: 110 BPM | DIASTOLIC BLOOD PRESSURE: 90 MMHG | RESPIRATION RATE: 16 BRPM | SYSTOLIC BLOOD PRESSURE: 135 MMHG | OXYGEN SATURATION: 98 %

## 2021-10-05 DIAGNOSIS — G43.719 CHRONIC MIGRAINE WITHOUT AURA, INTRACTABLE, WITHOUT STATUS MIGRAINOSUS: Primary | ICD-10-CM

## 2021-10-05 PROCEDURE — 64615 CHEMODENERV MUSC MIGRAINE: CPT | Performed by: PHYSICAL MEDICINE & REHABILITATION

## 2021-10-05 ASSESSMENT — PAIN SCALES - GENERAL: PAINLEVEL: NO PAIN (0)

## 2021-10-05 NOTE — PROGRESS NOTES
BOTULINUM TOXIN PROCEDURE - HEADACHE - NOTE    Chief Complaint   Patient presents with     Botox       Current Outpatient Medications:      albuterol (PROAIR HFA/PROVENTIL HFA/VENTOLIN HFA) 108 (90 Base) MCG/ACT inhaler, Inhale 2 puffs into the lungs every 6 hours as needed for shortness of breath / dyspnea or wheezing, Disp: 1 Inhaler, Rfl: 1     amitriptyline (ELAVIL) 25 MG tablet, TAKE 1 TABLET BY MOUTH EVERY NIGHT AT BEDTIME, Disp: 90 tablet, Rfl: 1     Apremilast (OTEZLA) 10 & 20 & 30 MG TBPK, Take by mouth according to the instructions on the packet. Hold for signs of infection,any GI upset, weight loss or depression concerns, and seek medical attention., Disp: 1 each, Rfl: 0     apremilast (OTEZLA) 30 MG tablet, Take 1 tablet (30 mg) by mouth 2 times daily Hold for signs of infection, and seek medical attention., Disp: 180 tablet, Rfl: 3     artificial tears OINT ophthalmic ointment, 0.5 inch strip each eye at night, Disp: 1 Tube, Rfl: 11     benzocaine (TOPICALE XTRA) 20 % GEL, Apply as needed locally to mouth or nasal ulcers for pain; 4 times daily as needed, Disp: 30 g, Rfl: 1     betamethasone valerate (VALISONE) 0.1 % cream, Apply topically 2 times daily as needed , Disp: , Rfl:      bisacodyl (DULCOLAX) 5 MG EC tablet, Take 2 tablets (10 mg) by mouth daily as needed for constipation, Disp: 30 tablet, Rfl: 0     citalopram (CELEXA) 20 MG tablet, Take 1 tablet (20 mg) by mouth daily, Disp: 90 tablet, Rfl: 1     colchicine (COLCYRS) 0.6 MG tablet, TAKE 1 TABLET (0.6 MG) BY MOUTH 2 TIMES DAILY, Disp: 180 tablet, Rfl: 1     cyproheptadine (PERIACTIN) 4 MG tablet, TAKE 2 TABLETS (8 MG) BY MOUTH AT BEDTIME FOR NIGHTMARES, Disp: 180 tablet, Rfl: 1     dexamethasone (DECADRON) 4 MG/ML injection, To be used by therapist during PT sessions., Disp: 30 mL, Rfl: 0     diclofenac (VOLTAREN) 75 MG EC tablet, Take 1 tablet (75 mg) by mouth 2 times daily as needed for moderate pain (Patient not taking: Reported on  6/15/2021), Disp: 60 tablet, Rfl: 1     dicyclomine (BENTYL) 20 MG tablet, Take 1 tablet (20 mg) by mouth 4 times daily as needed, Disp: 120 tablet, Rfl: 3     diphenhydrAMINE (BENADRYL) 25 MG tablet, Take 1 tablet (25 mg) by mouth 3 times daily as needed (itching), Disp: 40 tablet, Rfl: 1     EPINEPHrine (EPIPEN 2-EAMON) 0.3 MG/0.3ML injection, Inject 0.3 mLs (0.3 mg) into the muscle as needed for anaphylaxis, Disp: 0.6 mL, Rfl: 3     furosemide (LASIX) 20 MG tablet, Take 1 tablet (20 mg) by mouth 3 times daily, Disp: 60 tablet, Rfl: 3     furosemide (LASIX) 40 MG tablet, TAKE ONE TABLET BY MOUTH TWICE DAILY , Disp: 60 tablet, Rfl: 0     gabapentin (NEURONTIN) 300 MG capsule, TAKE ONE CAPSULE BY MOUTH THREE TIMES DAILY , Disp: 90 capsule, Rfl: 0     guanFACINE (TENEX) 2 MG tablet, Take one and one half tabs in the morning and one and one half in the evening. (Patient not taking: Reported on 6/15/2021), Disp: 270 tablet, Rfl: 3     hypromellose (GENTEAL) 0.3 % SOLN, 1 drop every hour as needed for dry eyes , Disp: , Rfl:      lactulose 20 GM/30ML SOLN, Take 30 mLs by mouth 3 times daily as needed (for constipation), Disp: 300 mL, Rfl: 3     lidocaine (LMX4) 4 % external cream, Apply topically once as needed for mild pain, Disp: 120 g, Rfl: 1     lidocaine (LMX4) 4 % external cream, Apply 1 Application topically once as needed for mild pain, Disp: , Rfl:      LINZESS 290 MCG capsule, TAKE 1 CAPSULE BY MOUTH EVERY DAY IN THE MORNING BEFORE BREAKFAST, Disp: 90 capsule, Rfl: 0     LORazepam (ATIVAN) 0.5 MG tablet, TAKE 1 TABLET BY MOUTH EVERY 6 HOURS AS NEEDED FOR ANXIETY, Disp: 10 tablet, Rfl: 0     ondansetron (ZOFRAN-ODT) 8 MG ODT tab, Take 1 tablet (8 mg) by mouth every 8 hours as needed for nausea, Disp: 180 tablet, Rfl: 1     order for DME, Equipment being ordered: [employer] brace 8 1/2 left lower extremity (Patient not taking: Reported on 6/15/2021), Disp: 1 Device, Rfl: 0     order for DME, Equipment being ordered:  size 8 1/2 walking boot tall (Patient not taking: Reported on 6/15/2021), Disp: 1 Device, Rfl: 0     polyethylene glycol (MIRALAX/GLYCOLAX) powder, Take 1 capful by mouth 3 times daily, Disp: , Rfl:      predniSONE (DELTASONE) 20 MG tablet, Take two tablets (= 40mg) each day for 4 (four) days, Disp: 8 tablet, Rfl: 0     sodium fluoride (DENTA 5000 PLUS) 1.1 % CREA, Apply 1 Application topically daily, Disp: , Rfl:      sucralfate (CARAFATE) 1 GM/10ML suspension, Take 10 mLs (1 g) by mouth 4 times daily, Disp: 1200 mL, Rfl: 2     triamcinolone (KENALOG) 0.5 % external ointment, Apply 1 g topically 3 times daily as needed for irritation (Patient not taking: Reported on 6/15/2021), Disp: 15 g, Rfl: 3    Current Facility-Administered Medications:      botulinum toxin type A (BOTOX) 100 units injection 200 Units, 200 Units, Intramuscular, Q90 Days, StandalAlejandrina MD     lidocaine 1 % injection 0.5 mL, 0.5 mL, , , Andres Johnson, DPM, 0.5 mL at 06/15/21 0957     lidocaine 1 % injection 0.5 mL, 0.5 mL, , , Andres Johnson, DPM, 0.5 mL at 09/15/20 1554     methylPREDNISolone (DEPO-MEDROL) injection 40 mg, 40 mg, , , Yeo, Albert, MD, 40 mg at 09/29/20 1154     triamcinolone (KENALOG-40) injection 40 mg, 40 mg, , , Andres Johnson, DPM, 40 mg at 06/15/21 0957     triamcinolone (KENALOG-40) injection 40 mg, 40 mg, , , Andres Johnson, DPM, 40 mg at 09/15/20 1554     Allergies   Allergen Reactions     Amoxil [Penicillins] Rash     Dad unsure of reaction.     Bee Venom Anaphylaxis     Bioflavonoids Anaphylaxis     Citrus Anaphylaxis     All Freeborn     Contrast Dye Rash     Contrast Media [facility], 04/09/2014.; Contrast [facility] [employer], 04/09/2014.  NOTE: this is a contrast media oral with iodine. Premedicate with methylpred standard for IV contrast, request barium contrast for oral contrast.     Diagnostic X-Ray Materials Hives and Rash     Contrast Media [employer] [facility], 04/09/2014.;  Contrast Media [employer] [place] [facility], 04/09/2014.  NOTE: this is a contrast media oral with iodine. Premedicate with methylpred standard for IV contrast, request barium contrast for oral contrast.     Pineapple Anaphylaxis, Difficulty breathing and Rash     Reglan [Metoclopramide] Other (See Comments)     IV dose only, in ER, rapid heart rate.     Ace Inhibitors      Difficulty in breathing and GI upset     Amitiza [Lubiprostone] Nausea and Vomiting     Amoxicillin-Pot Clavulanate      Midazolam Unknown     parent states that when pt takes this medication, she wakes up being very violent .     Other [No Clinical Screening - See Comments]      Bleech/ chest tightness, itchy throat, swollen tongue, hives     Tizanidine Other (See Comments)     Confusion, back pain, photophobia, abdominal pain, shaking, anxious       Versed      Coming out of pelvic exam at age of 5, was kicking and screaming when coming out of the versed.     Adhesive Tape Rash     Azithromycin Hives and Rash     Cephalexin Itching and Rash        PHYSICAL EXAM:    VS: BP (!) 135/90   Pulse 110   Temp 99.1  F (37.3  C)   Resp 16   SpO2 98%    Ongoing headache left side 3/10  No facial asymmetry  No evidence for skin infections in head and neck    HPI:  Patient reports the following new medical problems since last visit: she underwent corticosteroid injections into her right elbow, right wrist and right thumb joint.      We reviewed the recommended safety guidelines for  Botox from any vaccine injection, such as the seasonal flu vaccine, by a minimum of 10-14 days with Samara Oropeza. She acknowledged understanding.      RESPONSE TO PREVIOUS TREATMENT:  Change in headache pattern following last series of injections with 200 units of  Botox on 7/9/2021.    Post-procedural headache: Rated as 'Mild' severity.  Duration: 3 days then completely resolved    1.  Headache frequency during this injection cycle:  Patient reports 15-20 headache days  per month, which is an increase from prior cycles on Botox, when she would have around 3 headache days per month. This is compared to her baseline headache frequency of up to 30 headache days per month.    2.  Headache duration during this injection cycle:  Headache duration ranged from 2-3 days.  Patient reports that all of her migraine headache episodes lasted longer than 2 days during this injection cycle.    3.  Headache intensity during this injection cycle:    A.  4/10  =  Typical pain level.    B.  10/10  =  Worst pain level.  C.  0/10  =  Lowest pain level.    4.  Change in headache medication usage during this injection cycle:  (For Example:  Able to decrease use of oral pain medications.) No new medication changes, still taking Excedrin occasionally    5.  ER Visits During This Injection Cycle:  None      6.  Functional Performance: Change in ADL's, social interaction, days lost from work, etc. Light  sensitivity. Patient holding off Cosmetology until R elbow bursa surgery.    BOTULINUM NEUROTOXIN INJECTION PROCEDURES:    VERIFICATION OF PATIENT IDENTIFICATION AND PROCEDURE     Initials   Patient Name ses   Patient  ses   Procedure Verified by: ses     Prior to the start of the procedure and with procedural staff participation, I verbally confirmed the patient s identity using two indicators, relevant allergies, that the procedure was appropriate and matched the consent or emergent situation, and that the correct equipment/implants were available. Immediately prior to starting the procedure I conducted the Time Out with the procedural staff and re-confirmed the patient s name, procedure, and site/side. (The Joint Commission universal protocol was followed.)  Yes    Sedation (Moderate or Deep): None    Above assessments performed by:    Alejandrina Hernandez MD       INDICATIONS FOR PROCEDURES:  Samara Oropeza is a 25 year old patient with a complex medical history that includes chronic migraine  headaches associated with cervicogenic components.      Her baseline symptoms have been recalcitrant to oral medications and conservative therapy. She is here today for re-evaluation and it was decided to reinject with Botox.     GOAL OF PROCEDURE:  The goal of this procedure is to decrease pain.    TOTAL DOSE: 200 UNITS BOTOX  Dose Administered:  200 units  Botox (Botulinum Toxin Type A)       2:1 Dilution   Diluent Used:  0.25% Sensorcaine  (NDC: 9287-4868-45,  Lot: -[id number],  Exp: 13/1/2023)  Total Volume of Diluent Used:  4 ml  Lot # [id number] with Expiration Date:  02/2024  NDC #: Botox 100u ([address]-45)     Medication guide was offered to patient and was declined.    CONSENT:  The risks, benefits, and treatment options were discussed with Samara Oropeza and she agreed to proceed.    Written consent was obtained by [facility].     EQUIPMENT USED:  Needle-30 gauge  25-mm EMG needle  EMG    SKIN PREPARATION:  Skin preparation was performed using an alcohol wipe.     GUIDANCE DESCRIPTION:  Electro-myographic guidance was necessary throughout the shoulder and neck muscles to accurately identify all areas of dystonic muscles while avoiding injection of non-dystonic muscles and non-spastic muscles, neighboring nerves and nearby vascular structures.     AREA/MUSCLE INJECTED:  200 UNITS BOTOX = TOTAL DOSE     1 & 2. SHOULDER GIRDLE & NECK MUSCLES: 50 units Botox = Total Dose, 2:1 Dilution      Right Splenius Cervicis - 5 units of Botox over 2 site/s.   Left Splenius Cervicis - 5 units of Botox over 2 site/s.     Right Rectus Capitis - 2.5 units of Botox at 1 site.  Left Rectus Capitis - 2.5 units of Botox at 1site.     Right Lateral Trapezius - 5 units of Botox over 2 sites/s.  Left Lateral Trapezius - 5 units of Botox over 2 site/s.      Right Levator Scapulae - 10 units of Botox over 2 site/s.   Left Levator Scapulae - 10 units of Botox over 2 site/s.    Right Sternocleidomastoid - 5 units of Botox over 1 site/s.      3. HEAD & SCALP MUSCLES: 150 units Botox = Total Dose, 2:1 Dilution     Right Occipitalis - 5 units of Botox at 1 site/s.   Left Occipitalis - 5 units of Botox at 1 site/s.     Right Frontalis - 20 units of Botox over 4 site/s.  Left Frontalis - 20 units of Botox over 4 site/s.     Right Temporalis - 45 units of Botox over 8 site/s.  Left Temporalis - 45 units of Botox over 8 site/s.     Right  - 2.5 units of Botox at 1 site/s.              Left  - 2.5 units of Botox at 1 site/s.                 Procerus - 5 units of Botox at 1 site/s.      RESPONSE TO PROCEDURE:  Samara Oropeza tolerated the procedure well and there were no immediate complications.  She was allowed to recover for an appropriate period of time and was discharged home in stable condition.    FOLLOW UP:  Samara Oropeza was asked to follow up by phone in 7-14 days with Sharmila Lynn PT to report her response to this series of injections.  Based on the patient's previous response to this therapy, Samara Oropeza was rescheduled for the next series of injections in 12 weeks.    PLAN (Medication Changes, Therapy Orders, Work or Disability Issues, etc.):  Patient will continue to monitor response to today's injections.

## 2021-10-05 NOTE — LETTER
10/5/2021       RE: Samara Oropeza  [address]  [address]  [facility] [place] [address]     Dear Colleague,    Thank you for referring your patient, Samara Oropeza, to the [facility] [place] at [facility]. Please see a copy of my visit note below.    BOTULINUM TOXIN PROCEDURE - HEADACHE - NOTE    Chief Complaint   Patient presents with     Botox       Current Outpatient Medications:      albuterol (PROAIR HFA/PROVENTIL HFA/VENTOLIN HFA) 108 (90 Base) MCG/ACT inhaler, Inhale 2 puffs into the lungs every 6 hours as needed for shortness of breath / dyspnea or wheezing, Disp: 1 Inhaler, Rfl: 1     amitriptyline (ELAVIL) 25 MG tablet, TAKE 1 TABLET BY MOUTH EVERY NIGHT AT BEDTIME, Disp: 90 tablet, Rfl: 1     Apremilast (OTEZLA) 10 & 20 & 30 MG TBPK, Take by mouth according to the instructions on the packet. Hold for signs of infection,any GI upset, weight loss or depression concerns, and seek medical attention., Disp: 1 each, Rfl: 0     apremilast (OTEZLA) 30 MG tablet, Take 1 tablet (30 mg) by mouth 2 times daily Hold for signs of infection, and seek medical attention., Disp: 180 tablet, Rfl: 3     artificial tears OINT ophthalmic ointment, 0.5 inch strip each eye at night, Disp: 1 Tube, Rfl: 11     benzocaine (TOPICALE XTRA) 20 % GEL, Apply as needed locally to mouth or nasal ulcers for pain; 4 times daily as needed, Disp: 30 g, Rfl: 1     betamethasone valerate (VALISONE) 0.1 % cream, Apply topically 2 times daily as needed , Disp: , Rfl:      bisacodyl (DULCOLAX) 5 MG EC tablet, Take 2 tablets (10 mg) by mouth daily as needed for constipation, Disp: 30 tablet, Rfl: 0     citalopram (CELEXA) 20 MG tablet, Take 1 tablet (20 mg) by mouth daily, Disp: 90 tablet, Rfl: 1     colchicine (COLCYRS) 0.6 MG tablet, TAKE 1 TABLET (0.6 MG) BY MOUTH 2 TIMES DAILY, Disp: 180 tablet, Rfl: 1     cyproheptadine  (PERIACTIN) 4 MG tablet, TAKE 2 TABLETS (8 MG) BY MOUTH AT BEDTIME FOR NIGHTMARES, Disp: 180 tablet, Rfl: 1     dexamethasone (DECADRON) 4 MG/ML injection, To be used by therapist during PT sessions., Disp: 30 mL, Rfl: 0     diclofenac (VOLTAREN) 75 MG EC tablet, Take 1 tablet (75 mg) by mouth 2 times daily as needed for moderate pain (Patient not taking: Reported on 6/15/2021), Disp: 60 tablet, Rfl: 1     dicyclomine (BENTYL) 20 MG tablet, Take 1 tablet (20 mg) by mouth 4 times daily as needed, Disp: 120 tablet, Rfl: 3     diphenhydrAMINE (BENADRYL) 25 MG tablet, Take 1 tablet (25 mg) by mouth 3 times daily as needed (itching), Disp: 40 tablet, Rfl: 1     EPINEPHrine (EPIPEN 2-EAMON) 0.3 MG/0.3ML injection, Inject 0.3 mLs (0.3 mg) into the muscle as needed for anaphylaxis, Disp: 0.6 mL, Rfl: 3     furosemide (LASIX) 20 MG tablet, Take 1 tablet (20 mg) by mouth 3 times daily, Disp: 60 tablet, Rfl: 3     furosemide (LASIX) 40 MG tablet, TAKE ONE TABLET BY MOUTH TWICE DAILY , Disp: 60 tablet, Rfl: 0     gabapentin (NEURONTIN) 300 MG capsule, TAKE ONE CAPSULE BY MOUTH THREE TIMES DAILY , Disp: 90 capsule, Rfl: 0     guanFACINE (TENEX) 2 MG tablet, Take one and one half tabs in the morning and one and one half in the evening. (Patient not taking: Reported on 6/15/2021), Disp: 270 tablet, Rfl: 3     hypromellose (GENTEAL) 0.3 % SOLN, 1 drop every hour as needed for dry eyes , Disp: , Rfl:      lactulose 20 GM/30ML SOLN, Take 30 mLs by mouth 3 times daily as needed (for constipation), Disp: 300 mL, Rfl: 3     lidocaine (LMX4) 4 % external cream, Apply topically once as needed for mild pain, Disp: 120 g, Rfl: 1     lidocaine (LMX4) 4 % external cream, Apply 1 Application topically once as needed for mild pain, Disp: , Rfl:      LINZESS 290 MCG capsule, TAKE 1 CAPSULE BY MOUTH EVERY DAY IN THE MORNING BEFORE BREAKFAST, Disp: 90 capsule, Rfl: 0     LORazepam (ATIVAN) 0.5 MG tablet, TAKE 1 TABLET BY MOUTH EVERY 6 HOURS AS  NEEDED FOR ANXIETY, Disp: 10 tablet, Rfl: 0     ondansetron (ZOFRAN-ODT) 8 MG ODT tab, Take 1 tablet (8 mg) by mouth every 8 hours as needed for nausea, Disp: 180 tablet, Rfl: 1     order for DME, Equipment being ordered: Trilok brace 8 1/2 left lower extremity (Patient not taking: Reported on 6/15/2021), Disp: 1 Device, Rfl: 0     order for DME, Equipment being ordered: size 8 1/2 walking boot tall (Patient not taking: Reported on 6/15/2021), Disp: 1 Device, Rfl: 0     polyethylene glycol (MIRALAX/GLYCOLAX) powder, Take 1 capful by mouth 3 times daily, Disp: , Rfl:      predniSONE (DELTASONE) 20 MG tablet, Take two tablets (= 40mg) each day for 4 (four) days, Disp: 8 tablet, Rfl: 0     sodium fluoride (DENTA 5000 PLUS) 1.1 % CREA, Apply 1 Application topically daily, Disp: , Rfl:      sucralfate (CARAFATE) 1 GM/10ML suspension, Take 10 mLs (1 g) by mouth 4 times daily, Disp: 1200 mL, Rfl: 2     triamcinolone (KENALOG) 0.5 % external ointment, Apply 1 g topically 3 times daily as needed for irritation (Patient not taking: Reported on 6/15/2021), Disp: 15 g, Rfl: 3    Current Facility-Administered Medications:      botulinum toxin type A (BOTOX) 100 units injection 200 Units, 200 Units, Intramuscular, Q90 Days, StandAlejandrina bullard MD     lidocaine 1 % injection 0.5 mL, 0.5 mL, , , Andres Johnson DPM, 0.5 mL at 06/15/21 0957     lidocaine 1 % injection 0.5 mL, 0.5 mL, , , Andres Johnson DPM, 0.5 mL at 09/15/20 1554     methylPREDNISolone (DEPO-MEDROL) injection 40 mg, 40 mg, , , Yeo, Albert, MD, 40 mg at 09/29/20 1154     triamcinolone (KENALOG-40) injection 40 mg, 40 mg, , , Andres Johnson DPM, 40 mg at 06/15/21 0957     triamcinolone (KENALOG-40) injection 40 mg, 40 mg, , , Andres Johnson, DPM, 40 mg at 09/15/20 1558     Allergies   Allergen Reactions     Amoxil [Penicillins] Rash     Dad unsure of reaction.     Bee Venom Anaphylaxis     Bioflavonoids Anaphylaxis     Citrus Anaphylaxis      All Citrus     Contrast Dye Rash     Contrast Media [facility] [place] 04/09/2014.; Contrast [facility] [place], 04/09/2014.  NOTE: this is a contrast media oral with iodine. Premedicate with methylpred standard for IV contrast, request barium contrast for oral contrast.     Diagnostic X-Ray Materials Hives and Rash     Contrast [facility] [place], 04/09/2014.; Contrast [facility] [place], 04/09/2014.  NOTE: this is a contrast media oral with iodine. Premedicate with methylpred standard for IV contrast, request barium contrast for oral contrast.     Pineapple Anaphylaxis, Difficulty breathing and Rash     Reglan [Metoclopramide] Other (See Comments)     IV dose only, in ER, rapid heart rate.     Ace Inhibitors      Difficulty in breathing and GI upset     Amitiza [Lubiprostone] Nausea and Vomiting     Amoxicillin-Pot Clavulanate      Midazolam Unknown     parent states that when pt takes this medication, she wakes up being very violent .     Other [No Clinical Screening - See Comments]      Bleech/ chest tightness, itchy throat, swollen tongue, hives     Tizanidine Other (See Comments)     Confusion, back pain, photophobia, abdominal pain, shaking, anxious       Versed      Coming out of pelvic exam at age of 5, was kicking and screaming when coming out of the versed.     Adhesive Tape Rash     Azithromycin Hives and Rash     Cephalexin Itching and Rash        PHYSICAL EXAM:    VS: BP (!) 135/90   Pulse 110   Temp 99.1  F (37.3  C)   Resp 16   SpO2 98%    Ongoing headache left side 3/10  No facial asymmetry  No evidence for skin infections in head and neck    HPI:  Patient reports the following new medical problems since last visit: she underwent corticosteroid injections into her right elbow, right wrist and right thumb joint.      We reviewed the recommended safety guidelines for  Botox from any vaccine injection, such as the seasonal flu vaccine, by a minimum of 10-14 days with Samara QUICK  Sandip. She acknowledged understanding.      RESPONSE TO PREVIOUS TREATMENT:  Change in headache pattern following last series of injections with 200 units of  Botox on 2021.    Post-procedural headache: Rated as 'Mild' severity.  Duration: 3 days then completely resolved    1.  Headache frequency during this injection cycle:  Patient reports 15-20 headache days per month, which is an increase from prior cycles on Botox, when she would have around 3 headache days per month. This is compared to her baseline headache frequency of up to 30 headache days per month.    2.  Headache duration during this injection cycle:  Headache duration ranged from 2-3 days.  Patient reports that all of her migraine headache episodes lasted longer than 2 days during this injection cycle.    3.  Headache intensity during this injection cycle:    A.  4/10  =  Typical pain level.    B.  10/10  =  Worst pain level.  C.  0/10  =  Lowest pain level.    4.  Change in headache medication usage during this injection cycle:  (For Example:  Able to decrease use of oral pain medications.) No new medication changes, still taking Excedrin occasionally    5.  ER Visits During This Injection Cycle:  None      6.  Functional Performance: Change in ADL's, social interaction, days lost from work, etc. Light  sensitivity. Patient holding off Cosmetology until R elbow bursa surgery.    BOTULINUM NEUROTOXIN INJECTION PROCEDURES:    VERIFICATION OF PATIENT IDENTIFICATION AND PROCEDURE     Initials   Patient Name ses   Patient  ses   Procedure Verified by: kirill     Prior to the start of the procedure and with procedural staff participation, I verbally confirmed the patient s identity using two indicators, relevant allergies, that the procedure was appropriate and matched the consent or emergent situation, and that the correct equipment/implants were available. Immediately prior to starting the procedure I conducted the Time Out with the procedural staff  and re-confirmed the patient s name, procedure, and site/side. (The Joint Commission universal protocol was followed.)  Yes    Sedation (Moderate or Deep): None    Above assessments performed by:    Alejandrina Hernandez MD       INDICATIONS FOR PROCEDURES:  Samara Oropeza is a 25 year old patient with a complex medical history that includes chronic migraine headaches associated with cervicogenic components.      Her baseline symptoms have been recalcitrant to oral medications and conservative therapy. She is here today for re-evaluation and it was decided to reinject with Botox.     GOAL OF PROCEDURE:  The goal of this procedure is to decrease pain.    TOTAL DOSE: 200 UNITS BOTOX  Dose Administered:  200 units  Botox (Botulinum Toxin Type A)       2:1 Dilution   Diluent Used:  0.25% Sensorcaine  (NDC: 8665-2464-65,  Lot: -[id number],  Exp: 13/1/2023)  Total Volume of Diluent Used:  4 ml  Lot # [id number] with Expiration Date:  02/2024  NDC #: Botox 100u ([address]-36)     Medication guide was offered to patient and was declined.    CONSENT:  The risks, benefits, and treatment options were discussed with Samara Oropeza and she agreed to proceed.    Written consent was obtained by [facility].     EQUIPMENT USED:  Needle-30 gauge  25-mm EMG needle  EMG    SKIN PREPARATION:  Skin preparation was performed using an alcohol wipe.     GUIDANCE DESCRIPTION:  Electro-myographic guidance was necessary throughout the shoulder and neck muscles to accurately identify all areas of dystonic muscles while avoiding injection of non-dystonic muscles and non-spastic muscles, neighboring nerves and nearby vascular structures.     AREA/MUSCLE INJECTED:  200 UNITS BOTOX = TOTAL DOSE     1 & 2. SHOULDER GIRDLE & NECK MUSCLES: 50 units Botox = Total Dose, 2:1 Dilution      Right Splenius Cervicis - 5 units of Botox over 2 site/s.   Left Splenius Cervicis - 5 units of Botox over 2 site/s.     Right Rectus Capitis - 2.5 units of Botox at 1  site.  Left Rectus Capitis - 2.5 units of Botox at 1site.     Right Lateral Trapezius - 5 units of Botox over 2 sites/s.  Left Lateral Trapezius - 5 units of Botox over 2 site/s.      Right Levator Scapulae - 10 units of Botox over 2 site/s.   Left Levator Scapulae - 10 units of Botox over 2 site/s.    Right Sternocleidomastoid - 5 units of Botox over 1 site/s.     3. HEAD & SCALP MUSCLES: 150 units Botox = Total Dose, 2:1 Dilution     Right Occipitalis - 5 units of Botox at 1 site/s.   Left Occipitalis - 5 units of Botox at 1 site/s.     Right Frontalis - 20 units of Botox over 4 site/s.  Left Frontalis - 20 units of Botox over 4 site/s.     Right Temporalis - 45 units of Botox over 8 site/s.  Left Temporalis - 45 units of Botox over 8 site/s.     Right  - 2.5 units of Botox at 1 site/s.              Left  - 2.5 units of Botox at 1 site/s.                 Procerus - 5 units of Botox at 1 site/s.      RESPONSE TO PROCEDURE:  Samara Oropeza tolerated the procedure well and there were no immediate complications.  She was allowed to recover for an appropriate period of time and was discharged home in stable condition.    FOLLOW UP:  Samara Oropeza was asked to follow up by phone in 7-14 days with Sharmila Lynn PT to report her response to this series of injections.  Based on the patient's previous response to this therapy, Samara Oropeza was rescheduled for the next series of injections in 12 weeks.    PLAN (Medication Changes, Therapy Orders, Work or Disability Issues, etc.):  Patient will continue to monitor response to today's injections.       Again, thank you for allowing me to participate in the care of your patient.      Sincerely,    Alejandrina Hernandez MD

## 2021-10-06 RX ORDER — BUPIVACAINE HYDROCHLORIDE 2.5 MG/ML
4 INJECTION, SOLUTION EPIDURAL; INFILTRATION; INTRACAUDAL ONCE
Status: COMPLETED | OUTPATIENT
Start: 2021-10-06 | End: 2021-10-06

## 2021-10-06 RX ADMIN — BUPIVACAINE HYDROCHLORIDE 10 MG: 2.5 INJECTION, SOLUTION EPIDURAL; INFILTRATION; INTRACAUDAL at 08:31

## 2021-10-11 ENCOUNTER — OFFICE VISIT (OUTPATIENT)
Dept: PODIATRY | Facility: CLINIC | Age: 26
End: 2021-10-11
Payer: COMMERCIAL

## 2021-10-11 VITALS
BODY MASS INDEX: 28.53 KG/M2 | HEIGHT: 63 IN | DIASTOLIC BLOOD PRESSURE: 74 MMHG | SYSTOLIC BLOOD PRESSURE: 126 MMHG | WEIGHT: 161 LBS

## 2021-10-11 DIAGNOSIS — G57.92 NEURITIS OF LEFT LOWER EXTREMITY: Primary | ICD-10-CM

## 2021-10-11 DIAGNOSIS — M25.572 PAIN AND SWELLING OF LEFT ANKLE: ICD-10-CM

## 2021-10-11 DIAGNOSIS — M25.472 PAIN AND SWELLING OF LEFT ANKLE: ICD-10-CM

## 2021-10-11 PROCEDURE — 99213 OFFICE O/P EST LOW 20 MIN: CPT | Mod: 25 | Performed by: PODIATRIST

## 2021-10-11 PROCEDURE — 20605 DRAIN/INJ JOINT/BURSA W/O US: CPT | Mod: LT | Performed by: PODIATRIST

## 2021-10-11 RX ADMIN — TRIAMCINOLONE ACETONIDE 40 MG: 40 INJECTION, SUSPENSION INTRA-ARTICULAR; INTRAMUSCULAR at 09:28

## 2021-10-11 RX ADMIN — LIDOCAINE HYDROCHLORIDE 0.5 ML: 10 INJECTION, SOLUTION INFILTRATION; PERINEURAL at 09:28

## 2021-10-11 ASSESSMENT — MIFFLIN-ST. JEOR: SCORE: 1434.42

## 2021-10-11 NOTE — PROGRESS NOTES
"Foot & Ankle Surgery   October 11, 2021    S:  Pt is seen today for evaluation of left ankle pain.  She was last seen on 6/15/2021.  She received a diagnostic/therapeutic intra-articular injection.  She states about 85% of her pain was improved.  She states she had some continued pain at the anterolateral lower leg.  Her previous exam was consistent with common peroneal neuritis as she has enteral lateral left lower leg pain and paresthesias as well as paresthesias at the dorsal lateral foot..    Vitals:    10/11/21 0904   BP: 126/74   Weight: 73 kg (161 lb)   Height: 1.6 m (5' 3\")   '      ROS - Pos for CC.  Patient denies current nausea, vomiting, chills, fevers, belly pain, calf pain, chest pain or SOB.  Complete remainder of ROS it otherwise neg.      PE:  Gen:   No apparent distress  Eye:    Visual scanning without deficit  Ear:    Response to auditory stimuli wnl  Lung:    Non-labored breathing on RA noted  Abd:    NTND per patient report  Lymph:    Neg for pitting/non-pitting edema BLE  Vasc:    Pulses palpable, CFT minimally delayed  Neuro:    Light touch sensation managed with paresthesias at the dorsal lateral foot and anterolateral lower leg  Derm:    Neg for nodules, lesions or ulcerations  MSK:    Continued discomfort with palpation of the left ankle medial recess and anterior gutter.  She is tender along the course of the superficial peroneal nerve proximal to the ankle and she is very tender with palpation of the common peroneal nerve at the fibular head.  Calf:    Neg for redness, swelling or tenderness    Assessment:  26 year old female with left ankle intra-articular pain and common peroneal neuritis      Plan:  Discussed etiologies, anatomy and options  1.  Left ankle intra-articular pain and common peroneal neuritis  -Diagnostic/therapeutic intra-articular injection, see procedure note for details.  We discussed that relief from injections is typically temporary and a long-term solution will be " needed.  If injection provides substantial relief, it again indicates intra-articular issues are contributing to her pain.  In that setting, ankle arthroscopy would be necessary.  We discussed that her last MRI was in September 2019 and repeating this may be indicated.  -Regarding the neuritis, her common peroneal nerve seems to be the focus.  I previously ordered physical therapy for this but she was not able to do it as she was focusing on PT for her right arm issues.  We discussed the option for a sports med evaluation of her common peroneal nerve versus continuing to see her current knee specialist.    Medium Joint Injection/Arthrocentesis: L ankle    Date/Time: 10/11/2021 9:28 AM  Performed by: Andres Johnson DPM  Authorized by: Andres Johnson DPM     Indications:  Pain and diagnostic evaluation  Needle Size:  25 G  Guidance: surface landmarks    Approach:  Anteromedial  Location:  Ankle  Site:  L ankle  Medications:  40 mg triamcinolone 40 MG/ML; 0.5 mL lidocaine 1 %   After obtaining written consent, the joint was identified and the skin was prepped with alcohol and betadine.  The joint was distracted and the needle was advance to the underlying left ankle via the medial recess.  1 1/2cc mixture of 2:1 kenalog 40:1% lidocaine plain was injected.  The patient tolerated the procedure without complication.  Risks that were discussed included possible joint/cartilage damage, infection, pigment change, steroid flare.             Follow up:  Prn based on injection results or sooner with acute issues           Andres Johnson DPM FACFAS FACFAOM  Podiatric Foot & Ankle Surgeon  [facility]  [phone]    Disclaimer: This note consists of symbols derived from keyboarding, dictation and/or voice recognition software. As a result, there may be errors in the script that have gone undetected. Please consider this when interpreting information found in this chart.

## 2021-10-19 RX ORDER — TRIAMCINOLONE ACETONIDE 40 MG/ML
40 INJECTION, SUSPENSION INTRA-ARTICULAR; INTRAMUSCULAR
Status: SHIPPED | OUTPATIENT
Start: 2021-10-11

## 2021-10-19 RX ORDER — LIDOCAINE HYDROCHLORIDE 10 MG/ML
0.5 INJECTION, SOLUTION INFILTRATION; PERINEURAL
Status: DISCONTINUED | OUTPATIENT
Start: 2021-10-11 | End: 2023-02-12

## 2021-12-16 NOTE — TELEPHONE ENCOUNTER
Reason for call:  Other   Patient called regarding (reason for call): call back  Additional comments: The patient called and stated that she is currently on antibiotics and thinks she may have a yeast infection. She is wondering if Sonja Abreu could possibly prescribe her something to help with the yeast infection. She would like a call back to discuss if this would be possible or not.     Phone number to reach patient:  Cell number on file:    Telephone Information:   Mobile [phone]       Best Time: Any    Can we leave a detailed message on this number?  YES     .

## 2021-12-28 ENCOUNTER — OFFICE VISIT (OUTPATIENT)
Dept: PHYSICAL MEDICINE AND REHAB | Facility: CLINIC | Age: 26
End: 2021-12-28
Payer: COMMERCIAL

## 2021-12-28 VITALS
DIASTOLIC BLOOD PRESSURE: 75 MMHG | RESPIRATION RATE: 16 BRPM | OXYGEN SATURATION: 97 % | HEART RATE: 100 BPM | SYSTOLIC BLOOD PRESSURE: 119 MMHG | TEMPERATURE: 98.7 F

## 2021-12-28 DIAGNOSIS — G43.719 CHRONIC MIGRAINE WITHOUT AURA, INTRACTABLE, WITHOUT STATUS MIGRAINOSUS: Primary | ICD-10-CM

## 2021-12-28 PROCEDURE — 96372 THER/PROPH/DIAG INJ SC/IM: CPT | Performed by: PHYSICAL MEDICINE & REHABILITATION

## 2021-12-28 PROCEDURE — 64615 CHEMODENERV MUSC MIGRAINE: CPT | Mod: 59 | Performed by: PHYSICAL MEDICINE & REHABILITATION

## 2021-12-28 RX ORDER — BUPIVACAINE HYDROCHLORIDE 2.5 MG/ML
4 INJECTION, SOLUTION EPIDURAL; INFILTRATION; INTRACAUDAL ONCE
Status: COMPLETED | OUTPATIENT
Start: 2021-12-28 | End: 2021-12-28

## 2021-12-28 RX ADMIN — BUPIVACAINE HYDROCHLORIDE 10 MG: 2.5 INJECTION, SOLUTION EPIDURAL; INFILTRATION; INTRACAUDAL at 12:36

## 2021-12-28 NOTE — NURSING NOTE
Chief Complaint   Patient presents with     Headache     ump return botox- migraine        Shaquille Redman, EMT

## 2021-12-28 NOTE — LETTER
12/28/2021       RE: Samara Oropeza  [address]  [address]  [facility] [address]     Dear Colleague,    Thank you for referring your patient, Samara Oropeza, to the [facility] [place] at [facility]. Please see a copy of my visit note below.    BOTULINUM TOXIN PROCEDURE - HEADACHE - NOTE    Chief Complaint   Patient presents with     Headache     ump return botox- migraine        Current Outpatient Medications:      albuterol (PROAIR HFA/PROVENTIL HFA/VENTOLIN HFA) 108 (90 Base) MCG/ACT inhaler, Inhale 2 puffs into the lungs every 6 hours as needed for shortness of breath / dyspnea or wheezing, Disp: 1 Inhaler, Rfl: 1     amitriptyline (ELAVIL) 25 MG tablet, TAKE 1 TABLET BY MOUTH EVERY NIGHT AT BEDTIME, Disp: 90 tablet, Rfl: 1     Apremilast (OTEZLA) 10 & 20 & 30 MG TBPK, Take by mouth according to the instructions on the packet. Hold for signs of infection,any GI upset, weight loss or depression concerns, and seek medical attention., Disp: 1 each, Rfl: 0     apremilast (OTEZLA) 30 MG tablet, Take 1 tablet (30 mg) by mouth 2 times daily Hold for signs of infection, and seek medical attention., Disp: 180 tablet, Rfl: 3     artificial tears OINT ophthalmic ointment, 0.5 inch strip each eye at night, Disp: 1 Tube, Rfl: 11     benzocaine (TOPICALE XTRA) 20 % GEL, Apply as needed locally to mouth or nasal ulcers for pain; 4 times daily as needed, Disp: 30 g, Rfl: 1     betamethasone valerate (VALISONE) 0.1 % cream, Apply topically 2 times daily as needed , Disp: , Rfl:      bisacodyl (DULCOLAX) 5 MG EC tablet, Take 2 tablets (10 mg) by mouth daily as needed for constipation, Disp: 30 tablet, Rfl: 0     citalopram (CELEXA) 20 MG tablet, Take 1 tablet (20 mg) by mouth daily, Disp: 90 tablet, Rfl: 1     colchicine (COLCYRS) 0.6 MG tablet, TAKE 1 TABLET (0.6 MG) BY MOUTH 2 TIMES DAILY, Disp: 180  tablet, Rfl: 1     cyproheptadine (PERIACTIN) 4 MG tablet, TAKE 2 TABLETS (8 MG) BY MOUTH AT BEDTIME FOR NIGHTMARES, Disp: 180 tablet, Rfl: 1     dexamethasone (DECADRON) 4 MG/ML injection, To be used by therapist during PT sessions., Disp: 30 mL, Rfl: 0     diclofenac (VOLTAREN) 75 MG EC tablet, Take 1 tablet (75 mg) by mouth 2 times daily as needed for moderate pain (Patient not taking: Reported on 6/15/2021), Disp: 60 tablet, Rfl: 1     dicyclomine (BENTYL) 20 MG tablet, Take 1 tablet (20 mg) by mouth 4 times daily as needed, Disp: 120 tablet, Rfl: 3     diphenhydrAMINE (BENADRYL) 25 MG tablet, Take 1 tablet (25 mg) by mouth 3 times daily as needed (itching), Disp: 40 tablet, Rfl: 1     EPINEPHrine (EPIPEN 2-EAMON) 0.3 MG/0.3ML injection, Inject 0.3 mLs (0.3 mg) into the muscle as needed for anaphylaxis, Disp: 0.6 mL, Rfl: 3     furosemide (LASIX) 20 MG tablet, Take 1 tablet (20 mg) by mouth 3 times daily, Disp: 60 tablet, Rfl: 3     furosemide (LASIX) 40 MG tablet, TAKE ONE TABLET BY MOUTH TWICE DAILY , Disp: 60 tablet, Rfl: 0     gabapentin (NEURONTIN) 300 MG capsule, TAKE ONE CAPSULE BY MOUTH THREE TIMES DAILY , Disp: 90 capsule, Rfl: 0     guanFACINE (TENEX) 2 MG tablet, Take one and one half tabs in the morning and one and one half in the evening. (Patient not taking: Reported on 6/15/2021), Disp: 270 tablet, Rfl: 3     hypromellose (GENTEAL) 0.3 % SOLN, 1 drop every hour as needed for dry eyes , Disp: , Rfl:      lactulose 20 GM/30ML SOLN, Take 30 mLs by mouth 3 times daily as needed (for constipation), Disp: 300 mL, Rfl: 3     lidocaine (LMX4) 4 % external cream, Apply topically once as needed for mild pain, Disp: 120 g, Rfl: 1     lidocaine (LMX4) 4 % external cream, Apply 1 Application topically once as needed for mild pain, Disp: , Rfl:      LINZESS 290 MCG capsule, TAKE 1 CAPSULE BY MOUTH EVERY DAY IN THE MORNING BEFORE BREAKFAST, Disp: 90 capsule, Rfl: 0     LORazepam (ATIVAN) 0.5 MG tablet, TAKE 1  TABLET BY MOUTH EVERY 6 HOURS AS NEEDED FOR ANXIETY, Disp: 10 tablet, Rfl: 0     ondansetron (ZOFRAN-ODT) 8 MG ODT tab, Take 1 tablet (8 mg) by mouth every 8 hours as needed for nausea, Disp: 180 tablet, Rfl: 1     order for DME, Equipment being ordered: Trilok brace 8 1/2 left lower extremity (Patient not taking: Reported on 6/15/2021), Disp: 1 Device, Rfl: 0     order for DME, Equipment being ordered: size 8 1/2 walking boot tall (Patient not taking: Reported on 6/15/2021), Disp: 1 Device, Rfl: 0     polyethylene glycol (MIRALAX/GLYCOLAX) powder, Take 1 capful by mouth 3 times daily, Disp: , Rfl:      predniSONE (DELTASONE) 20 MG tablet, Take two tablets (= 40mg) each day for 4 (four) days, Disp: 8 tablet, Rfl: 0     sodium fluoride (DENTA 5000 PLUS) 1.1 % CREA, Apply 1 Application topically daily, Disp: , Rfl:      sucralfate (CARAFATE) 1 GM/10ML suspension, Take 10 mLs (1 g) by mouth 4 times daily, Disp: 1200 mL, Rfl: 2     triamcinolone (KENALOG) 0.5 % external ointment, Apply 1 g topically 3 times daily as needed for irritation (Patient not taking: Reported on 6/15/2021), Disp: 15 g, Rfl: 3    Current Facility-Administered Medications:      botulinum toxin type A (BOTOX) 100 units injection 200 Units, 200 Units, Intramuscular, Q90 Days, StandalAlejandrina MD     lidocaine 1 % injection 0.5 mL, 0.5 mL, , , Andres Johnson DPM, 0.5 mL at 10/11/21 0928     lidocaine 1 % injection 0.5 mL, 0.5 mL, , , Andres Johnson DPM, 0.5 mL at 06/15/21 0957     lidocaine 1 % injection 0.5 mL, 0.5 mL, , , Andres Johnson DPM, 0.5 mL at 09/15/20 1554     methylPREDNISolone (DEPO-MEDROL) injection 40 mg, 40 mg, , , Yeo, Albert, MD, 40 mg at 09/29/20 1154     triamcinolone (KENALOG-40) injection 40 mg, 40 mg, , , Andres Johnson DPM, 40 mg at 10/11/21 0928     triamcinolone (KENALOG-40) injection 40 mg, 40 mg, , , Andres Johnson DPM, 40 mg at 06/15/21 0957     triamcinolone (KENALOG-40) injection 40  mg, 40 mg, , , Andres Johnson, DPM, 40 mg at 09/15/20 2411     Allergies   Allergen Reactions     Amoxil [Penicillins] Rash     Dad unsure of reaction.     Bee Venom Anaphylaxis     Bioflavonoids Anaphylaxis     Citrus Anaphylaxis     All Ogle     Contrast Dye Rash     Contrast Media Ready-Box [facility], 04/09/2014.; Contrast Media Ready-Box [facility], 04/09/2014.  NOTE: this is a contrast media oral with iodine. Premedicate with methylpred standard for IV contrast, request barium contrast for oral contrast.     Diagnostic X-Ray Materials Hives and Rash     Contrast Media Ready-Box [facility], 04/09/2014.; Contrast Media Ready-Box [facility], 04/09/2014.  NOTE: this is a contrast media oral with iodine. Premedicate with methylpred standard for IV contrast, request barium contrast for oral contrast.     Pineapple Anaphylaxis, Difficulty breathing and Rash     Reglan [Metoclopramide] Other (See Comments)     IV dose only, in ER, rapid heart rate.     Ace Inhibitors      Difficulty in breathing and GI upset     Amitiza [Lubiprostone] Nausea and Vomiting     Amoxicillin-Pot Clavulanate      Midazolam Unknown     parent states that when pt takes this medication, she wakes up being very violent .     Other [No Clinical Screening - See Comments]      Bleech/ chest tightness, itchy throat, swollen tongue, hives     Tizanidine Other (See Comments)     Confusion, back pain, photophobia, abdominal pain, shaking, anxious       Versed      Coming out of pelvic exam at age of 5, was kicking and screaming when coming out of the versed.     Adhesive Tape Rash     Azithromycin Hives and Rash     Cephalexin Itching and Rash     Sulfa Drugs Rash     Skin scarring        PHYSICAL EXAM:    VS: /75   Pulse 100   Temp 98.7  F (37.1  C) (Oral)   Resp 16   SpO2 97%    No facial asymmetry  No evidence for skin infections in head and neck    HPI:  Patient reports the following new medical problems since last visit: she underwent corticosteroid  injections into her right elbow, right wrist and right thumb joint.      We reviewed the recommended safety guidelines for  Botox from any vaccine injection, such as the seasonal flu vaccine, by a minimum of 10-14 days with Samara Oropeza. She acknowledged understanding.      RESPONSE TO PREVIOUS TREATMENT:  Change in headache pattern following last series of injections with 200 units of  Botox on 10/5/2021.    Side effects: Post-procedural headache: Rated as 'Mild' severity.  Duration: 2 days then completely resolved    1.  Headache frequency during this injection cycle:  Patient reports 4-6 headache days per month, which is an improvement from prior cycle. This is compared to her baseline headache frequency of up to 30 headache days per month.    2.  Headache duration during this injection cycle:  Headache duration was usually two days.  Patient reports that most of her migraine headache episodes lasted longer approximately two days during this injection cycle.    3.  Headache intensity during this injection cycle:    A.  4/10  =  Typical pain level.    B.  8/10  =  Worst pain level.  C.  0/10  =  Lowest pain level.    4.  Change in headache medication usage during this injection cycle:  (For Example:  Able to decrease use of oral pain medications.) No new medication changes, still taking Excedrin and extra strength Tylenol as needed for headaches.     5.  ER Visits During This Injection Cycle:  None.      6.  Functional Performance: No days lost from work due to headaches this cycle.     BOTULINUM NEUROTOXIN INJECTION PROCEDURES:    VERIFICATION OF PATIENT IDENTIFICATION AND PROCEDURE     Initials   Patient Name ses   Patient  ses   Procedure Verified by: ses     Prior to the start of the procedure and with procedural staff participation, I verbally confirmed the patient s identity using two indicators, relevant allergies, that the procedure was appropriate and matched the consent or emergent  situation, and that the correct equipment/implants were available. Immediately prior to starting the procedure I conducted the Time Out with the procedural staff and re-confirmed the patient s name, procedure, and site/side. (The Joint Commission universal protocol was followed.)  Yes    Sedation (Moderate or Deep): None    Above assessments performed by:    Alejandrina Hernandez MD       INDICATIONS FOR PROCEDURES:  Samara Oropeza is a 26 year old patient with a complex medical history that includes chronic migraine headaches associated with cervicogenic components.      Her baseline symptoms have been recalcitrant to oral medications and conservative therapy. She is here today for re-evaluation and it was decided to reinject with Botox.     GOAL OF PROCEDURE:  The goal of this procedure is to decrease pain.    TOTAL DOSE: 200 UNITS BOTOX  Dose Administered:  200 units  Botox (Botulinum Toxin Type A)  2:1 Dilution   Diluent Used:  0.25% Sensorcaine  (NDC: 6952-6662-78,  Lot: [id number],  Exp: 5/1/2023)  Total Volume of Diluent Used:  4 ml  Lot # [id number] with Expiration Date:  02/2024  NDC #: Botox 100u ([address]-54)     Medication guide was offered to patient and was declined.    CONSENT:  The risks, benefits, and treatment options were discussed with aSmara Oropeza and she agreed to proceed.    Written consent was obtained by [facility].     EQUIPMENT USED:  Needle-30 gauge  25-mm EMG needle  EMG    SKIN PREPARATION:  Skin preparation was performed using an alcohol wipe.     GUIDANCE DESCRIPTION:  Electro-myographic guidance was necessary throughout the shoulder and neck muscles to accurately identify all areas of dystonic muscles while avoiding injection of non-dystonic muscles and non-spastic muscles, neighboring nerves and nearby vascular structures.     AREA/MUSCLE INJECTED:  200 UNITS BOTOX = TOTAL DOSE     1 & 2. SHOULDER GIRDLE & NECK MUSCLES: 50 units Botox = Total Dose, 2:1 Dilution      Right Splenius  Cervicis - 5 units of Botox over 2 site/s.   Left Splenius Cervicis - 5 units of Botox over 2 site/s.     Right Rectus Capitis - 2.5 units of Botox at 1 site.  Left Rectus Capitis - 2.5 units of Botox at 1site.     Right Lateral Trapezius - 5 units of Botox over 2 sites/s.  Left Lateral Trapezius - 5 units of Botox over 2 site/s.      Right Levator Scapulae - 10 units of Botox over 2 site/s.   Left Levator Scapulae - 10 units of Botox over 2 site/s.    Right Anterior Scalene - 2.5 units of Botox over 1 site/s.     Right Sternocleidomastoid - 2.5 units of Botox over 1 site/s.     3. HEAD & SCALP MUSCLES: 150 units Botox = Total Dose, 2:1 Dilution     Right Occipitalis - 5 units of Botox over 1 site/s.   Left Occipitalis - 5 units of Botox over 1 site/s.     Right Frontalis - 20 units of Botox over 4 site/s.  Left Frontalis - 20 units of Botox over 4 site/s.     Right Temporalis - 45 units of Botox over 8 site/s.  Left Temporalis - 45 units of Botox over 8 site/s.     Right  - 2.5 units of Botox over 1 site/s.              Left  - 2.5 units of Botox over 1 site/s.                 Procerus - 5 units of Botox at 1 site/s.      RESPONSE TO PROCEDURE:  Samara Oropeza tolerated the procedure well and there were no immediate complications.  She was allowed to recover for an appropriate period of time and was discharged home in stable condition.    FOLLOW UP:  Samara Oropeza was asked to follow up by phone in 7-14 days with Sharmila Lynn PT to report her response to this series of injections.  Based on the patient's previous response to this therapy, Samara Oropeza was rescheduled for the next series of injections in 12 weeks.    PLAN (Medication Changes, Therapy Orders, Work or Disability Issues, etc.):  Patient will continue to monitor response to today's injections.         Alejandrina Hernandez MD

## 2021-12-28 NOTE — PROGRESS NOTES
BOTULINUM TOXIN PROCEDURE - HEADACHE - NOTE    Chief Complaint   Patient presents with     Headache     ump return botox- migraine        Current Outpatient Medications:      albuterol (PROAIR HFA/PROVENTIL HFA/VENTOLIN HFA) 108 (90 Base) MCG/ACT inhaler, Inhale 2 puffs into the lungs every 6 hours as needed for shortness of breath / dyspnea or wheezing, Disp: 1 Inhaler, Rfl: 1     amitriptyline (ELAVIL) 25 MG tablet, TAKE 1 TABLET BY MOUTH EVERY NIGHT AT BEDTIME, Disp: 90 tablet, Rfl: 1     Apremilast (OTEZLA) 10 & 20 & 30 MG TBPK, Take by mouth according to the instructions on the packet. Hold for signs of infection,any GI upset, weight loss or depression concerns, and seek medical attention., Disp: 1 each, Rfl: 0     apremilast (OTEZLA) 30 MG tablet, Take 1 tablet (30 mg) by mouth 2 times daily Hold for signs of infection, and seek medical attention., Disp: 180 tablet, Rfl: 3     artificial tears OINT ophthalmic ointment, 0.5 inch strip each eye at night, Disp: 1 Tube, Rfl: 11     benzocaine (TOPICALE XTRA) 20 % GEL, Apply as needed locally to mouth or nasal ulcers for pain; 4 times daily as needed, Disp: 30 g, Rfl: 1     betamethasone valerate (VALISONE) 0.1 % cream, Apply topically 2 times daily as needed , Disp: , Rfl:      bisacodyl (DULCOLAX) 5 MG EC tablet, Take 2 tablets (10 mg) by mouth daily as needed for constipation, Disp: 30 tablet, Rfl: 0     citalopram (CELEXA) 20 MG tablet, Take 1 tablet (20 mg) by mouth daily, Disp: 90 tablet, Rfl: 1     colchicine (COLCYRS) 0.6 MG tablet, TAKE 1 TABLET (0.6 MG) BY MOUTH 2 TIMES DAILY, Disp: 180 tablet, Rfl: 1     cyproheptadine (PERIACTIN) 4 MG tablet, TAKE 2 TABLETS (8 MG) BY MOUTH AT BEDTIME FOR NIGHTMARES, Disp: 180 tablet, Rfl: 1     dexamethasone (DECADRON) 4 MG/ML injection, To be used by therapist during PT sessions., Disp: 30 mL, Rfl: 0     diclofenac (VOLTAREN) 75 MG EC tablet, Take 1 tablet (75 mg) by mouth 2 times daily as needed for moderate pain  (Patient not taking: Reported on 6/15/2021), Disp: 60 tablet, Rfl: 1     dicyclomine (BENTYL) 20 MG tablet, Take 1 tablet (20 mg) by mouth 4 times daily as needed, Disp: 120 tablet, Rfl: 3     diphenhydrAMINE (BENADRYL) 25 MG tablet, Take 1 tablet (25 mg) by mouth 3 times daily as needed (itching), Disp: 40 tablet, Rfl: 1     EPINEPHrine (EPIPEN 2-EAMON) 0.3 MG/0.3ML injection, Inject 0.3 mLs (0.3 mg) into the muscle as needed for anaphylaxis, Disp: 0.6 mL, Rfl: 3     furosemide (LASIX) 20 MG tablet, Take 1 tablet (20 mg) by mouth 3 times daily, Disp: 60 tablet, Rfl: 3     furosemide (LASIX) 40 MG tablet, TAKE ONE TABLET BY MOUTH TWICE DAILY , Disp: 60 tablet, Rfl: 0     gabapentin (NEURONTIN) 300 MG capsule, TAKE ONE CAPSULE BY MOUTH THREE TIMES DAILY , Disp: 90 capsule, Rfl: 0     guanFACINE (TENEX) 2 MG tablet, Take one and one half tabs in the morning and one and one half in the evening. (Patient not taking: Reported on 6/15/2021), Disp: 270 tablet, Rfl: 3     hypromellose (GENTEAL) 0.3 % SOLN, 1 drop every hour as needed for dry eyes , Disp: , Rfl:      lactulose 20 GM/30ML SOLN, Take 30 mLs by mouth 3 times daily as needed (for constipation), Disp: 300 mL, Rfl: 3     lidocaine (LMX4) 4 % external cream, Apply topically once as needed for mild pain, Disp: 120 g, Rfl: 1     lidocaine (LMX4) 4 % external cream, Apply 1 Application topically once as needed for mild pain, Disp: , Rfl:      LINZESS 290 MCG capsule, TAKE 1 CAPSULE BY MOUTH EVERY DAY IN THE MORNING BEFORE BREAKFAST, Disp: 90 capsule, Rfl: 0     LORazepam (ATIVAN) 0.5 MG tablet, TAKE 1 TABLET BY MOUTH EVERY 6 HOURS AS NEEDED FOR ANXIETY, Disp: 10 tablet, Rfl: 0     ondansetron (ZOFRAN-ODT) 8 MG ODT tab, Take 1 tablet (8 mg) by mouth every 8 hours as needed for nausea, Disp: 180 tablet, Rfl: 1     order for DME, Equipment being ordered: Trilok brace 8 1/2 left lower extremity (Patient not taking: Reported on 6/15/2021), Disp: 1 Device, Rfl: 0     order  for DME, Equipment being ordered: size 8 1/2 walking boot tall (Patient not taking: Reported on 6/15/2021), Disp: 1 Device, Rfl: 0     polyethylene glycol (MIRALAX/GLYCOLAX) powder, Take 1 capful by mouth 3 times daily, Disp: , Rfl:      predniSONE (DELTASONE) 20 MG tablet, Take two tablets (= 40mg) each day for 4 (four) days, Disp: 8 tablet, Rfl: 0     sodium fluoride (DENTA 5000 PLUS) 1.1 % CREA, Apply 1 Application topically daily, Disp: , Rfl:      sucralfate (CARAFATE) 1 GM/10ML suspension, Take 10 mLs (1 g) by mouth 4 times daily, Disp: 1200 mL, Rfl: 2     triamcinolone (KENALOG) 0.5 % external ointment, Apply 1 g topically 3 times daily as needed for irritation (Patient not taking: Reported on 6/15/2021), Disp: 15 g, Rfl: 3    Current Facility-Administered Medications:      botulinum toxin type A (BOTOX) 100 units injection 200 Units, 200 Units, Intramuscular, Q90 Days, Standal, Alejandrina Vera MD     lidocaine 1 % injection 0.5 mL, 0.5 mL, , , Andres Johnson, DPM, 0.5 mL at 10/11/21 0928     lidocaine 1 % injection 0.5 mL, 0.5 mL, , , Andres Johnson, DPM, 0.5 mL at 06/15/21 0957     lidocaine 1 % injection 0.5 mL, 0.5 mL, , , Andres Johnson DPM, 0.5 mL at 09/15/20 1554     methylPREDNISolone (DEPO-MEDROL) injection 40 mg, 40 mg, , , Yeo, Albert, MD, 40 mg at 09/29/20 1154     triamcinolone (KENALOG-40) injection 40 mg, 40 mg, , , Andres Johnson DPM, 40 mg at 10/11/21 0928     triamcinolone (KENALOG-40) injection 40 mg, 40 mg, , , Andres Johnson DPM, 40 mg at 06/15/21 0957     triamcinolone (KENALOG-40) injection 40 mg, 40 mg, , , Andres Johnson, CHRISTINAM, 40 mg at 09/15/20 1554     Allergies   Allergen Reactions     Amoxil [Penicillins] Rash     Dad unsure of reaction.     Bee Venom Anaphylaxis     Bioflavonoids Anaphylaxis     Citrus Anaphylaxis     All [place]     Contrast Dye Rash     Contrast Media Ready-Box [facility] [place], 04/09/2014.; Contrast Media Ready-[employer] [facility] [place], 04/09/2014.  NOTE:  this is a contrast media oral with iodine. Premedicate with methylpred standard for IV contrast, request barium contrast for oral contrast.     Diagnostic X-Ray Materials Hives and Rash     Contrast Media Ready-Box [facility] [place], 04/09/2014.; Contrast Media Ready-Box [facility] [place], 04/09/2014.  NOTE: this is a contrast media oral with iodine. Premedicate with methylpred standard for IV contrast, request barium contrast for oral contrast.     Pineapple Anaphylaxis, Difficulty breathing and Rash     Reglan [Metoclopramide] Other (See Comments)     IV dose only, in ER, rapid heart rate.     Ace Inhibitors      Difficulty in breathing and GI upset     Amitiza [Lubiprostone] Nausea and Vomiting     Amoxicillin-Pot Clavulanate      Midazolam Unknown     parent states that when pt takes this medication, she wakes up being very violent .     Other [No Clinical Screening - See Comments]      Bleech/ chest tightness, itchy throat, swollen tongue, hives     Tizanidine Other (See Comments)     Confusion, back pain, photophobia, abdominal pain, shaking, anxious       Versed      Coming out of pelvic exam at age of 5, was kicking and screaming when coming out of the versed.     Adhesive Tape Rash     Azithromycin Hives and Rash     Cephalexin Itching and Rash     Sulfa Drugs Rash     Skin scarring        PHYSICAL EXAM:    VS: /75   Pulse 100   Temp 98.7  F (37.1  C) (Oral)   Resp 16   SpO2 97%    No facial asymmetry  No evidence for skin infections in head and neck    HPI:  Patient reports the following new medical problems since last visit: she underwent corticosteroid injections into her right elbow, right wrist and right thumb joint.      We reviewed the recommended safety guidelines for  Botox from any vaccine injection, such as the seasonal flu vaccine, by a minimum of 10-14 days with Samara Oropeza. She acknowledged understanding.      RESPONSE TO PREVIOUS TREATMENT:  Change in headache pattern following last series  of injections with 200 units of  Botox on 10/5/2021.    Side effects: Post-procedural headache: Rated as 'Mild' severity.  Duration: 2 days then completely resolved    1.  Headache frequency during this injection cycle:  Patient reports 4-6 headache days per month, which is an improvement from prior cycle. This is compared to her baseline headache frequency of up to 30 headache days per month.    2.  Headache duration during this injection cycle:  Headache duration was usually two days.  Patient reports that most of her migraine headache episodes lasted longer approximately two days during this injection cycle.    3.  Headache intensity during this injection cycle:    A.  4/10  =  Typical pain level.    B.  8/10  =  Worst pain level.  C.  0/10  =  Lowest pain level.    4.  Change in headache medication usage during this injection cycle:  (For Example:  Able to decrease use of oral pain medications.) No new medication changes, still taking Excedrin and extra strength Tylenol as needed for headaches.     5.  ER Visits During This Injection Cycle:  None.      6.  Functional Performance: No days lost from work due to headaches this cycle.     BOTULINUM NEUROTOXIN INJECTION PROCEDURES:    VERIFICATION OF PATIENT IDENTIFICATION AND PROCEDURE     Initials   Patient Name ses   Patient  ses   Procedure Verified by: kirill     Prior to the start of the procedure and with procedural staff participation, I verbally confirmed the patient s identity using two indicators, relevant allergies, that the procedure was appropriate and matched the consent or emergent situation, and that the correct equipment/implants were available. Immediately prior to starting the procedure I conducted the Time Out with the procedural staff and re-confirmed the patient s name, procedure, and site/side. (The Joint Commission universal protocol was followed.)  Yes    Sedation (Moderate or Deep): None    Above assessments performed by:    Alejandrina TY  MD Mary       INDICATIONS FOR PROCEDURES:  Samara Oropeza is a 26 year old patient with a complex medical history that includes chronic migraine headaches associated with cervicogenic components.      Her baseline symptoms have been recalcitrant to oral medications and conservative therapy. She is here today for re-evaluation and it was decided to reinject with Botox.     GOAL OF PROCEDURE:  The goal of this procedure is to decrease pain.    TOTAL DOSE: 200 UNITS BOTOX  Dose Administered:  200 units  Botox (Botulinum Toxin Type A)  2:1 Dilution   Diluent Used:  0.25% Sensorcaine  (NDC: 2549-7042-60,  Lot: [id number],  Exp: 5/1/2023)  Total Volume of Diluent Used:  4 ml  Lot # [id number] with Expiration Date:  02/2024  NDC #: Botox 100u ([address]-92)     Medication guide was offered to patient and was declined.    CONSENT:  The risks, benefits, and treatment options were discussed with Samara Oroepza and she agreed to proceed.    Written consent was obtained by [facility].     EQUIPMENT USED:  Needle-30 gauge  25-mm EMG needle  EMG    SKIN PREPARATION:  Skin preparation was performed using an alcohol wipe.     GUIDANCE DESCRIPTION:  Electro-myographic guidance was necessary throughout the shoulder and neck muscles to accurately identify all areas of dystonic muscles while avoiding injection of non-dystonic muscles and non-spastic muscles, neighboring nerves and nearby vascular structures.     AREA/MUSCLE INJECTED:  200 UNITS BOTOX = TOTAL DOSE     1 & 2. SHOULDER GIRDLE & NECK MUSCLES: 50 units Botox = Total Dose, 2:1 Dilution      Right Splenius Cervicis - 5 units of Botox over 2 site/s.   Left Splenius Cervicis - 5 units of Botox over 2 site/s.     Right Rectus Capitis - 2.5 units of Botox at 1 site.  Left Rectus Capitis - 2.5 units of Botox at 1site.     Right Lateral Trapezius - 5 units of Botox over 2 sites/s.  Left Lateral Trapezius - 5 units of Botox over 2 site/s.      Right Levator Scapulae - 10 units of  Botox over 2 site/s.   Left Levator Scapulae - 10 units of Botox over 2 site/s.    Right Anterior Scalene - 2.5 units of Botox over 1 site/s.     Right Sternocleidomastoid - 2.5 units of Botox over 1 site/s.     3. HEAD & SCALP MUSCLES: 150 units Botox = Total Dose, 2:1 Dilution     Right Occipitalis - 5 units of Botox over 1 site/s.   Left Occipitalis - 5 units of Botox over 1 site/s.     Right Frontalis - 20 units of Botox over 4 site/s.  Left Frontalis - 20 units of Botox over 4 site/s.     Right Temporalis - 45 units of Botox over 8 site/s.  Left Temporalis - 45 units of Botox over 8 site/s.     Right  - 2.5 units of Botox over 1 site/s.              Left  - 2.5 units of Botox over 1 site/s.                 Procerus - 5 units of Botox at 1 site/s.      RESPONSE TO PROCEDURE:  Samara Oropeza tolerated the procedure well and there were no immediate complications.  She was allowed to recover for an appropriate period of time and was discharged home in stable condition.    FOLLOW UP:  Samara Oropeza was asked to follow up by phone in 7-14 days with Sharmila Lynn PT to report her response to this series of injections.  Based on the patient's previous response to this therapy, Samara Oropeza was rescheduled for the next series of injections in 12 weeks.    PLAN (Medication Changes, Therapy Orders, Work or Disability Issues, etc.):  Patient will continue to monitor response to today's injections.

## 2022-01-09 ENCOUNTER — HEALTH MAINTENANCE LETTER (OUTPATIENT)
Age: 27
End: 2022-01-09

## 2022-01-14 ENCOUNTER — HOME INFUSION (PRE-WILLOW HOME INFUSION) (OUTPATIENT)
Dept: PHARMACY | Facility: CLINIC | Age: 27
End: 2022-01-14
Payer: COMMERCIAL

## 2022-01-17 ENCOUNTER — HOME INFUSION (PRE-WILLOW HOME INFUSION) (OUTPATIENT)
Dept: PHARMACY | Facility: CLINIC | Age: 27
End: 2022-01-17
Payer: COMMERCIAL

## 2022-03-10 NOTE — PROGRESS NOTES
This is a recent snapshot of the patient's [facility] medical record.  For current drug dose and complete information and questions, call [phone] or In Basket pool, [facility] ([id number])  CSN Number:  [id number]

## 2022-03-23 DIAGNOSIS — G43.019 INTRACTABLE MIGRAINE WITHOUT AURA AND WITHOUT STATUS MIGRAINOSUS: Primary | ICD-10-CM

## 2022-03-24 ENCOUNTER — OFFICE VISIT (OUTPATIENT)
Dept: NEUROLOGY | Facility: CLINIC | Age: 27
End: 2022-03-24
Payer: COMMERCIAL

## 2022-03-24 VITALS — DIASTOLIC BLOOD PRESSURE: 92 MMHG | HEART RATE: 98 BPM | SYSTOLIC BLOOD PRESSURE: 124 MMHG

## 2022-03-24 DIAGNOSIS — G43.719 CHRONIC MIGRAINE WITHOUT AURA, INTRACTABLE, WITHOUT STATUS MIGRAINOSUS: Primary | ICD-10-CM

## 2022-03-24 PROCEDURE — 96372 THER/PROPH/DIAG INJ SC/IM: CPT | Performed by: PHYSICAL MEDICINE & REHABILITATION

## 2022-03-24 PROCEDURE — 64615 CHEMODENERV MUSC MIGRAINE: CPT | Mod: 59 | Performed by: PHYSICAL MEDICINE & REHABILITATION

## 2022-03-24 NOTE — PROGRESS NOTES
BOTULINUM TOXIN PROCEDURE - HEADACHE - NOTE    Chief Complaint   Patient presents with     Botox     cervical dystonia and migraines       Current Outpatient Medications:      albuterol (PROAIR HFA/PROVENTIL HFA/VENTOLIN HFA) 108 (90 Base) MCG/ACT inhaler, Inhale 2 puffs into the lungs every 6 hours as needed for shortness of breath / dyspnea or wheezing, Disp: 1 Inhaler, Rfl: 1     amitriptyline (ELAVIL) 25 MG tablet, TAKE 1 TABLET BY MOUTH EVERY NIGHT AT BEDTIME, Disp: 90 tablet, Rfl: 1     apremilast (OTEZLA) 30 MG tablet, Take 1 tablet (30 mg) by mouth 2 times daily Hold for signs of infection, and seek medical attention., Disp: 180 tablet, Rfl: 3     artificial tears OINT ophthalmic ointment, 0.5 inch strip each eye at night, Disp: 1 Tube, Rfl: 11     benzocaine (TOPICALE XTRA) 20 % GEL, Apply as needed locally to mouth or nasal ulcers for pain; 4 times daily as needed, Disp: 30 g, Rfl: 1     betamethasone valerate (VALISONE) 0.1 % cream, Apply topically 2 times daily as needed , Disp: , Rfl:      bisacodyl (DULCOLAX) 5 MG EC tablet, Take 2 tablets (10 mg) by mouth daily as needed for constipation, Disp: 30 tablet, Rfl: 0     citalopram (CELEXA) 20 MG tablet, Take 1 tablet (20 mg) by mouth daily, Disp: 90 tablet, Rfl: 1     colchicine (COLCYRS) 0.6 MG tablet, TAKE 1 TABLET (0.6 MG) BY MOUTH 2 TIMES DAILY, Disp: 180 tablet, Rfl: 1     cyproheptadine (PERIACTIN) 4 MG tablet, TAKE 2 TABLETS (8 MG) BY MOUTH AT BEDTIME FOR NIGHTMARES, Disp: 180 tablet, Rfl: 1     dexamethasone (DECADRON) 4 MG/ML injection, To be used by therapist during PT sessions., Disp: 30 mL, Rfl: 0     dicyclomine (BENTYL) 20 MG tablet, Take 1 tablet (20 mg) by mouth 4 times daily as needed, Disp: 120 tablet, Rfl: 3     diphenhydrAMINE (BENADRYL) 25 MG tablet, Take 1 tablet (25 mg) by mouth 3 times daily as needed (itching), Disp: 40 tablet, Rfl: 1     EPINEPHrine (EPIPEN 2-EAMON) 0.3 MG/0.3ML injection, Inject 0.3 mLs (0.3 mg) into the muscle as  needed for anaphylaxis, Disp: 0.6 mL, Rfl: 3     furosemide (LASIX) 20 MG tablet, Take 1 tablet (20 mg) by mouth 3 times daily, Disp: 60 tablet, Rfl: 3     gabapentin (NEURONTIN) 300 MG capsule, TAKE ONE CAPSULE BY MOUTH THREE TIMES DAILY , Disp: 90 capsule, Rfl: 0     hypromellose (GENTEAL) 0.3 % SOLN, 1 drop every hour as needed for dry eyes , Disp: , Rfl:      lidocaine (LMX4) 4 % external cream, Apply topically once as needed for mild pain, Disp: 120 g, Rfl: 1     LINZESS 290 MCG capsule, TAKE 1 CAPSULE BY MOUTH EVERY DAY IN THE MORNING BEFORE BREAKFAST, Disp: 90 capsule, Rfl: 0     LORazepam (ATIVAN) 0.5 MG tablet, TAKE 1 TABLET BY MOUTH EVERY 6 HOURS AS NEEDED FOR ANXIETY, Disp: 10 tablet, Rfl: 0     ondansetron (ZOFRAN-ODT) 8 MG ODT tab, Take 1 tablet (8 mg) by mouth every 8 hours as needed for nausea, Disp: 180 tablet, Rfl: 1     polyethylene glycol (MIRALAX/GLYCOLAX) powder, Take 1 capful by mouth 3 times daily, Disp: , Rfl:      sodium fluoride (DENTA 5000 PLUS) 1.1 % CREA, Apply 1 Application topically daily, Disp: , Rfl:      sucralfate (CARAFATE) 1 GM/10ML suspension, Take 10 mLs (1 g) by mouth 4 times daily, Disp: 1200 mL, Rfl: 2     Apremilast (OTEZLA) 10 & 20 & 30 MG TBPK, Take by mouth according to the instructions on the packet. Hold for signs of infection,any GI upset, weight loss or depression concerns, and seek medical attention., Disp: 1 each, Rfl: 0     diclofenac (VOLTAREN) 75 MG EC tablet, Take 1 tablet (75 mg) by mouth 2 times daily as needed for moderate pain (Patient not taking: Reported on 6/15/2021), Disp: 60 tablet, Rfl: 1     furosemide (LASIX) 40 MG tablet, TAKE ONE TABLET BY MOUTH TWICE DAILY  (Patient not taking: Reported on 3/24/2022), Disp: 60 tablet, Rfl: 0     guanFACINE (TENEX) 2 MG tablet, Take one and one half tabs in the morning and one and one half in the evening. (Patient not taking: Reported on 6/15/2021), Disp: 270 tablet, Rfl: 3     lactulose 20 GM/30ML SOLN, Take 30  mLs by mouth 3 times daily as needed (for constipation) (Patient not taking: Reported on 3/24/2022), Disp: 300 mL, Rfl: 3     lidocaine (LMX4) 4 % external cream, Apply 1 Application topically once as needed for mild pain, Disp: , Rfl:      order for DME, Equipment being ordered: Trilok brace 8 1/2 left lower extremity (Patient not taking: Reported on 6/15/2021), Disp: 1 Device, Rfl: 0     order for DME, Equipment being ordered: size 8 1/2 walking boot tall (Patient not taking: Reported on 6/15/2021), Disp: 1 Device, Rfl: 0     predniSONE (DELTASONE) 20 MG tablet, Take two tablets (= 40mg) each day for 4 (four) days (Patient not taking: Reported on 3/24/2022), Disp: 8 tablet, Rfl: 0     triamcinolone (KENALOG) 0.5 % external ointment, Apply 1 g topically 3 times daily as needed for irritation (Patient not taking: Reported on 6/15/2021), Disp: 15 g, Rfl: 3    Current Facility-Administered Medications:      botulinum toxin type A (BOTOX) 100 units injection 200 Units, 200 Units, Intramuscular, Q90 Days, Alejandrina Hernandez MD     botulinum toxin type A (BOTOX) 100 units injection 200 Units, 200 Units, Intramuscular, Q90 Days, Alejandrina Hernandez MD     lidocaine 1 % injection 0.5 mL, 0.5 mL, , , Andres Johnson DPM, 0.5 mL at 10/11/21 0928     lidocaine 1 % injection 0.5 mL, 0.5 mL, , , Andres Johnson DPM, 0.5 mL at 06/15/21 0957     lidocaine 1 % injection 0.5 mL, 0.5 mL, , , Andres Johnson DPM, 0.5 mL at 09/15/20 1554     methylPREDNISolone (DEPO-MEDROL) injection 40 mg, 40 mg, , , Yeo, Albert, MD, 40 mg at 09/29/20 1154     triamcinolone (KENALOG-40) injection 40 mg, 40 mg, , , Andres Johnson DPM, 40 mg at 10/11/21 0928     triamcinolone (KENALOG-40) injection 40 mg, 40 mg, , , Andres Johnson DPM, 40 mg at 06/15/21 0957     triamcinolone (KENALOG-40) injection 40 mg, 40 mg, , , Andres Johnson DPM, 40 mg at 09/15/20 1554     Allergies   Allergen Reactions     Amoxil  [Penicillins] Rash     Dad unsure of reaction.     Bee Venom Anaphylaxis     Bioflavonoids Anaphylaxis     Citrus Anaphylaxis     All [place]     Contrast Dye Rash     Contrast Media [facility] [place], 04/09/2014.; Contrast Media [facility] [place], 04/09/2014.  NOTE: this is a contrast media oral with iodine. Premedicate with methylpred standard for IV contrast, request barium contrast for oral contrast.     Diagnostic X-Ray Materials Hives and Rash     Contrast [facility] [place], 04/09/2014.; Contrast [facility] [place], 04/09/2014.  NOTE: this is a contrast media oral with iodine. Premedicate with methylpred standard for IV contrast, request barium contrast for oral contrast.     Pineapple Anaphylaxis, Difficulty breathing and Rash     Reglan [Metoclopramide] Other (See Comments)     IV dose only, in ER, rapid heart rate.     Ace Inhibitors      Difficulty in breathing and GI upset     Amitiza [Lubiprostone] Nausea and Vomiting     Amoxicillin-Pot Clavulanate      Midazolam Unknown     parent states that when pt takes this medication, she wakes up being very violent .     Other [No Clinical Screening - See Comments]      Bleech/ chest tightness, itchy throat, swollen tongue, hives     Tizanidine Other (See Comments)     Confusion, back pain, photophobia, abdominal pain, shaking, anxious       Versed      Coming out of pelvic exam at age of 5, was kicking and screaming when coming out of the versed.     Adhesive Tape Rash     Azithromycin Hives and Rash     Cephalexin Itching and Rash     Sulfa Drugs Rash     Skin scarring        PHYSICAL EXAM:    VS: BP (!) 124/92 (BP Location: Right arm, Patient Position: Sitting)   Pulse 98    No facial asymmetry  No evidence for skin infections in head and neck    HPI:  Patient reports the following new medical problems since last visit: she underwent corticosteroid injections into her right elbow, right wrist and right thumb joint.      We reviewed the recommended safety  guidelines for  Botox from any vaccine injection, such as the seasonal flu vaccine, by a minimum of 10-14 days with Samara Oropeza. She acknowledged understanding.      RESPONSE TO PREVIOUS TREATMENT:  Change in headache pattern following last series of injections with 200 units of  Botox on 10/5/2021.    Side effects: Post-procedural headache: Rated as 'Mild' severity.  Duration: 5 days then completely resolved    1.  Headache frequency during this injection cycle:  Patient reports 4 headache days per month, which is an improvement from previous cycle, but she consistently experiences more migraine headaches toward the end of her injection cycle. She has had a headache for the last 4 days. This is compared to her baseline headache frequency of up to 30 headache days per month.    2.  Headache duration during this injection cycle:  Headache duration was usually 2-4 days.  Patient reports that most of her migraine headache episodes lasted longer approximately two days during this injection cycle.    3.  Headache intensity during this injection cycle:    A.  4-5/10  =  Typical pain level.    B.  8/10  =  Worst pain level.  C.  0/10  =  Lowest pain level.    4.  Change in headache medication usage during this injection cycle:  (For Example:  Able to decrease use of oral pain medications.) No new medication changes, still taking Excedrin and extra strength Tylenol as needed for headaches but she is trying to take the Excedrin less frequently.     5.  ER Visits During This Injection Cycle:  None.      6.  Functional Performance: No days lost from work due to headaches this cycle.     BOTULINUM NEUROTOXIN INJECTION PROCEDURES:    VERIFICATION OF PATIENT IDENTIFICATION AND PROCEDURE     Initials   Patient Name ses   Patient  ses   Procedure Verified by: ses     Prior to the start of the procedure and with procedural staff participation, I verbally confirmed the patient s identity using two indicators,  relevant allergies, that the procedure was appropriate and matched the consent or emergent situation, and that the correct equipment/implants were available. Immediately prior to starting the procedure I conducted the Time Out with the procedural staff and re-confirmed the patient s name, procedure, and site/side. (The Joint Commission universal protocol was followed.)  Yes    Sedation (Moderate or Deep): None    Above assessments performed by:    Alejandrina Hernandez MD       INDICATIONS FOR PROCEDURES:  Samara Oropeza is a 26 year old patient with a complex medical history that includes chronic migraine headaches associated with cervicogenic components.      Her baseline symptoms have been recalcitrant to oral medications and conservative therapy. She is here today for re-evaluation and it was decided to reinject with Botox.     GOAL OF PROCEDURE:  The goal of this procedure is to decrease pain.    TOTAL DOSE: 200 UNITS BOTOX  Dose Administered:  200 units  Botox (Botulinum Toxin Type A)  2:1 Dilution   Diluent Used:  0.25% Sensorcaine  (NDC: 4238-9247-79,  Lot: [id number],  Exp: 5/1/2023)  Total Volume of Diluent Used:  4 ml  Lot # [id number] with Expiration Date:  05/2024  NDC #: Botox 100u ([address]-97)     Medication guide was offered to patient and was declined.    CONSENT:  The risks, benefits, and treatment options were discussed with Samara Oropeza and she agreed to proceed.    Written consent was obtained by [facility].     EQUIPMENT USED:  Needle-30 gauge  25-mm EMG needle  EMG    SKIN PREPARATION:  Skin preparation was performed using an alcohol wipe.     GUIDANCE DESCRIPTION:  Electro-myographic guidance was necessary throughout the shoulder and neck muscles to accurately identify all areas of dystonic muscles while avoiding injection of non-dystonic muscles and non-spastic muscles, neighboring nerves and nearby vascular structures.     AREA/MUSCLE INJECTED:  200 UNITS BOTOX = TOTAL DOSE     1 & 2.  SHOULDER GIRDLE & NECK MUSCLES: 50 units Botox = Total Dose, 2:1 Dilution      Right Splenius Cervicis - 5 units of Botox over 2 site/s.   Left Splenius Cervicis - 5 units of Botox over 2 site/s.     Right Rectus Capitis - 2.5 units of Botox at 1 site.  Left Rectus Capitis - 2.5 units of Botox at 1site.     Right Lateral Trapezius - 5 units of Botox over 2 sites/s.  Left Lateral Trapezius - 5 units of Botox over 2 site/s.      Right Levator Scapulae - 10 units of Botox over 2 site/s.   Left Levator Scapulae - 10 units of Botox over 2 site/s.    Right Anterior Scalene - 2.5 units of Botox over 1 site/s.     Right Sternocleidomastoid - 2.5 units of Botox over 1 site/s.     3. HEAD & SCALP MUSCLES: 150 units Botox = Total Dose, 2:1 Dilution     Right Occipitalis - 5 units of Botox over 1 site/s.   Left Occipitalis - 5 units of Botox over 1 site/s.     Right Frontalis - 20 units of Botox over 4 site/s.  Left Frontalis - 20 units of Botox over 4 site/s.     Right Temporalis - 45 units of Botox over 8 site/s.  Left Temporalis - 45 units of Botox over 8 site/s.     Right  - 2.5 units of Botox over 1 site/s.              Left  - 2.5 units of Botox over 1 site/s.                 Procerus - 5 units of Botox at 1 site/s.      RESPONSE TO PROCEDURE:  Samara Oropeza tolerated the procedure well and there were no immediate complications.  She was allowed to recover for an appropriate period of time and was discharged home in stable condition.    FOLLOW UP:  Samara Oropeza was asked to follow up by phone in 7-14 days with Sharmila Lynn PT to report her response to this series of injections.  Based on the patient's previous response to this therapy, Samara Oropeza was rescheduled for the next series of injections in 12 weeks.    PLAN (Medication Changes, Therapy Orders, Work or Disability Issues, etc.):  Patient will continue to monitor response to today's injections.

## 2022-03-24 NOTE — LETTER
3/24/2022         RE: Samara Oropeza  [address]  [address]  [place] [place] [address]        Dear Colleague,    Thank you for referring your patient, Samara Oropeza, to the [facility]. Please see a copy of my visit note below.    BOTULINUM TOXIN PROCEDURE - HEADACHE - NOTE    Chief Complaint   Patient presents with     Botox     cervical dystonia and migraines       Current Outpatient Medications:      albuterol (PROAIR HFA/PROVENTIL HFA/VENTOLIN HFA) 108 (90 Base) MCG/ACT inhaler, Inhale 2 puffs into the lungs every 6 hours as needed for shortness of breath / dyspnea or wheezing, Disp: 1 Inhaler, Rfl: 1     amitriptyline (ELAVIL) 25 MG tablet, TAKE 1 TABLET BY MOUTH EVERY NIGHT AT BEDTIME, Disp: 90 tablet, Rfl: 1     apremilast (OTEZLA) 30 MG tablet, Take 1 tablet (30 mg) by mouth 2 times daily Hold for signs of infection, and seek medical attention., Disp: 180 tablet, Rfl: 3     artificial tears OINT ophthalmic ointment, 0.5 inch strip each eye at night, Disp: 1 Tube, Rfl: 11     benzocaine (TOPICALE XTRA) 20 % GEL, Apply as needed locally to mouth or nasal ulcers for pain; 4 times daily as needed, Disp: 30 g, Rfl: 1     betamethasone valerate (VALISONE) 0.1 % cream, Apply topically 2 times daily as needed , Disp: , Rfl:      bisacodyl (DULCOLAX) 5 MG EC tablet, Take 2 tablets (10 mg) by mouth daily as needed for constipation, Disp: 30 tablet, Rfl: 0     citalopram (CELEXA) 20 MG tablet, Take 1 tablet (20 mg) by mouth daily, Disp: 90 tablet, Rfl: 1     colchicine (COLCYRS) 0.6 MG tablet, TAKE 1 TABLET (0.6 MG) BY MOUTH 2 TIMES DAILY, Disp: 180 tablet, Rfl: 1     cyproheptadine (PERIACTIN) 4 MG tablet, TAKE 2 TABLETS (8 MG) BY MOUTH AT BEDTIME FOR NIGHTMARES, Disp: 180 tablet, Rfl: 1     dexamethasone (DECADRON) 4 MG/ML injection, To be used by therapist during PT sessions., Disp: 30 mL, Rfl: 0     dicyclomine (BENTYL) 20 MG tablet, Take 1 tablet (20  mg) by mouth 4 times daily as needed, Disp: 120 tablet, Rfl: 3     diphenhydrAMINE (BENADRYL) 25 MG tablet, Take 1 tablet (25 mg) by mouth 3 times daily as needed (itching), Disp: 40 tablet, Rfl: 1     EPINEPHrine (EPIPEN 2-EAMON) 0.3 MG/0.3ML injection, Inject 0.3 mLs (0.3 mg) into the muscle as needed for anaphylaxis, Disp: 0.6 mL, Rfl: 3     furosemide (LASIX) 20 MG tablet, Take 1 tablet (20 mg) by mouth 3 times daily, Disp: 60 tablet, Rfl: 3     gabapentin (NEURONTIN) 300 MG capsule, TAKE ONE CAPSULE BY MOUTH THREE TIMES DAILY , Disp: 90 capsule, Rfl: 0     hypromellose (GENTEAL) 0.3 % SOLN, 1 drop every hour as needed for dry eyes , Disp: , Rfl:      lidocaine (LMX4) 4 % external cream, Apply topically once as needed for mild pain, Disp: 120 g, Rfl: 1     LINZESS 290 MCG capsule, TAKE 1 CAPSULE BY MOUTH EVERY DAY IN THE MORNING BEFORE BREAKFAST, Disp: 90 capsule, Rfl: 0     LORazepam (ATIVAN) 0.5 MG tablet, TAKE 1 TABLET BY MOUTH EVERY 6 HOURS AS NEEDED FOR ANXIETY, Disp: 10 tablet, Rfl: 0     ondansetron (ZOFRAN-ODT) 8 MG ODT tab, Take 1 tablet (8 mg) by mouth every 8 hours as needed for nausea, Disp: 180 tablet, Rfl: 1     polyethylene glycol (MIRALAX/GLYCOLAX) powder, Take 1 capful by mouth 3 times daily, Disp: , Rfl:      sodium fluoride (DENTA 5000 PLUS) 1.1 % CREA, Apply 1 Application topically daily, Disp: , Rfl:      sucralfate (CARAFATE) 1 GM/10ML suspension, Take 10 mLs (1 g) by mouth 4 times daily, Disp: 1200 mL, Rfl: 2     Apremilast (OTEZLA) 10 & 20 & 30 MG TBPK, Take by mouth according to the instructions on the packet. Hold for signs of infection,any GI upset, weight loss or depression concerns, and seek medical attention., Disp: 1 each, Rfl: 0     diclofenac (VOLTAREN) 75 MG EC tablet, Take 1 tablet (75 mg) by mouth 2 times daily as needed for moderate pain (Patient not taking: Reported on 6/15/2021), Disp: 60 tablet, Rfl: 1     furosemide (LASIX) 40 MG tablet, TAKE ONE TABLET BY MOUTH TWICE DAILY   (Patient not taking: Reported on 3/24/2022), Disp: 60 tablet, Rfl: 0     guanFACINE (TENEX) 2 MG tablet, Take one and one half tabs in the morning and one and one half in the evening. (Patient not taking: Reported on 6/15/2021), Disp: 270 tablet, Rfl: 3     lactulose 20 GM/30ML SOLN, Take 30 mLs by mouth 3 times daily as needed (for constipation) (Patient not taking: Reported on 3/24/2022), Disp: 300 mL, Rfl: 3     lidocaine (LMX4) 4 % external cream, Apply 1 Application topically once as needed for mild pain, Disp: , Rfl:      order for DME, Equipment being ordered: [employer] brace 8 1/2 left lower extremity (Patient not taking: Reported on 6/15/2021), Disp: 1 Device, Rfl: 0     order for DME, Equipment being ordered: size 8 1/2 walking boot tall (Patient not taking: Reported on 6/15/2021), Disp: 1 Device, Rfl: 0     predniSONE (DELTASONE) 20 MG tablet, Take two tablets (= 40mg) each day for 4 (four) days (Patient not taking: Reported on 3/24/2022), Disp: 8 tablet, Rfl: 0     triamcinolone (KENALOG) 0.5 % external ointment, Apply 1 g topically 3 times daily as needed for irritation (Patient not taking: Reported on 6/15/2021), Disp: 15 g, Rfl: 3    Current Facility-Administered Medications:      botulinum toxin type A (BOTOX) 100 units injection 200 Units, 200 Units, Intramuscular, Q90 Days, Alejandrina Hernandez MD     botulinum toxin type A (BOTOX) 100 units injection 200 Units, 200 Units, Intramuscular, Q90 Days, Alejandrina Hernandez MD     lidocaine 1 % injection 0.5 mL, 0.5 mL, , , Andres Johnson DPM, 0.5 mL at 10/11/21 0928     lidocaine 1 % injection 0.5 mL, 0.5 mL, , , Andres Johnson DPM, 0.5 mL at 06/15/21 0957     lidocaine 1 % injection 0.5 mL, 0.5 mL, , Elizabeth Jeremy, DPM, 0.5 mL at 09/15/20 1554     methylPREDNISolone (DEPO-MEDROL) injection 40 mg, 40 mg, , , Yeo, Albert, MD, 40 mg at 09/29/20 1154     triamcinolone (KENALOG-40) injection 40 mg, 40 mg, , , Andres Johnson,  DPM, 40 mg at 10/11/21 0928     triamcinolone (KENALOG-40) injection 40 mg, 40 mg, , , Andres Johnson, DPM, 40 mg at 06/15/21 0957     triamcinolone (KENALOG-40) injection 40 mg, 40 mg, , , Andres Johnson, DPM, 40 mg at 09/15/20 1554     Allergies   Allergen Reactions     Amoxil [Penicillins] Rash     Dad unsure of reaction.     Bee Venom Anaphylaxis     Bioflavonoids Anaphylaxis     Citrus Anaphylaxis     All Levy     Contrast Dye Rash     Contrast Media Ready-Box [facility] [place], 04/09/2014.; Contrast Media Ready-[employer] [facility] [place], 04/09/2014.  NOTE: this is a contrast media oral with iodine. Premedicate with methylpred standard for IV contrast, request barium contrast for oral contrast.     Diagnostic X-Ray Materials Hives and Rash     Contrast Media Ready-Box [facility] [place], 04/09/2014.; Contrast Media Ready-[employer] [facility] [place], 04/09/2014.  NOTE: this is a contrast media oral with iodine. Premedicate with methylpred standard for IV contrast, request barium contrast for oral contrast.     Pineapple Anaphylaxis, Difficulty breathing and Rash     Reglan [Metoclopramide] Other (See Comments)     IV dose only, in ER, rapid heart rate.     Ace Inhibitors      Difficulty in breathing and GI upset     Amitiza [Lubiprostone] Nausea and Vomiting     Amoxicillin-Pot Clavulanate      Midazolam Unknown     parent states that when pt takes this medication, she wakes up being very violent .     Other [No Clinical Screening - See Comments]      Bleech/ chest tightness, itchy throat, swollen tongue, hives     Tizanidine Other (See Comments)     Confusion, back pain, photophobia, abdominal pain, shaking, anxious       Versed      Coming out of pelvic exam at age of 5, was kicking and screaming when coming out of the versed.     Adhesive Tape Rash     Azithromycin Hives and Rash     Cephalexin Itching and Rash     Sulfa Drugs Rash     Skin scarring        PHYSICAL EXAM:    VS: BP (!) 124/92 (BP Location: Right arm, Patient Position: Sitting)   Pulse 98     No facial asymmetry  No evidence for skin infections in head and neck    HPI:  Patient reports the following new medical problems since last visit: she underwent corticosteroid injections into her right elbow, right wrist and right thumb joint.      We reviewed the recommended safety guidelines for  Botox from any vaccine injection, such as the seasonal flu vaccine, by a minimum of 10-14 days with Samara Oropeza. She acknowledged understanding.      RESPONSE TO PREVIOUS TREATMENT:  Change in headache pattern following last series of injections with 200 units of  Botox on 10/5/2021.    Side effects: Post-procedural headache: Rated as 'Mild' severity.  Duration: 5 days then completely resolved    1.  Headache frequency during this injection cycle:  Patient reports 4 headache days per month, which is an improvement from previous cycle, but she consistently experiences more migraine headaches toward the end of her injection cycle. She has had a headache for the last 4 days. This is compared to her baseline headache frequency of up to 30 headache days per month.    2.  Headache duration during this injection cycle:  Headache duration was usually 2-4 days.  Patient reports that most of her migraine headache episodes lasted longer approximately two days during this injection cycle.    3.  Headache intensity during this injection cycle:    A.  4-5/10  =  Typical pain level.    B.  8/10  =  Worst pain level.  C.  0/10  =  Lowest pain level.    4.  Change in headache medication usage during this injection cycle:  (For Example:  Able to decrease use of oral pain medications.) No new medication changes, still taking Excedrin and extra strength Tylenol as needed for headaches but she is trying to take the Excedrin less frequently.     5.  ER Visits During This Injection Cycle:  None.      6.  Functional Performance: No days lost from work due to headaches this cycle.     BOTULINUM NEUROTOXIN INJECTION  PROCEDURES:    VERIFICATION OF PATIENT IDENTIFICATION AND PROCEDURE     Initials   Patient Name ses   Patient  ses   Procedure Verified by: ses     Prior to the start of the procedure and with procedural staff participation, I verbally confirmed the patient s identity using two indicators, relevant allergies, that the procedure was appropriate and matched the consent or emergent situation, and that the correct equipment/implants were available. Immediately prior to starting the procedure I conducted the Time Out with the procedural staff and re-confirmed the patient s name, procedure, and site/side. (The Joint Commission universal protocol was followed.)  Yes    Sedation (Moderate or Deep): None    Above assessments performed by:    Alejandrina Hernandez MD       INDICATIONS FOR PROCEDURES:  Samara Oropeza is a 26 year old patient with a complex medical history that includes chronic migraine headaches associated with cervicogenic components.      Her baseline symptoms have been recalcitrant to oral medications and conservative therapy. She is here today for re-evaluation and it was decided to reinject with Botox.     GOAL OF PROCEDURE:  The goal of this procedure is to decrease pain.    TOTAL DOSE: 200 UNITS BOTOX  Dose Administered:  200 units  Botox (Botulinum Toxin Type A)  2:1 Dilution   Diluent Used:  0.25% Sensorcaine  (NDC: 4164-4030-83,  Lot: [id number],  Exp: 2023)  Total Volume of Diluent Used:  4 ml  Lot # [id number] with Expiration Date:  2024  NDC #: Botox 100u ([address]-01)     Medication guide was offered to patient and was declined.    CONSENT:  The risks, benefits, and treatment options were discussed with Samara Oropeza and she agreed to proceed.    Written consent was obtained by [facility].     EQUIPMENT USED:  Needle-30 gauge  25-mm EMG needle  EMG    SKIN PREPARATION:  Skin preparation was performed using an alcohol wipe.     GUIDANCE DESCRIPTION:  Electro-myographic guidance was  necessary throughout the shoulder and neck muscles to accurately identify all areas of dystonic muscles while avoiding injection of non-dystonic muscles and non-spastic muscles, neighboring nerves and nearby vascular structures.     AREA/MUSCLE INJECTED:  200 UNITS BOTOX = TOTAL DOSE     1 & 2. SHOULDER GIRDLE & NECK MUSCLES: 50 units Botox = Total Dose, 2:1 Dilution      Right Splenius Cervicis - 5 units of Botox over 2 site/s.   Left Splenius Cervicis - 5 units of Botox over 2 site/s.     Right Rectus Capitis - 2.5 units of Botox at 1 site.  Left Rectus Capitis - 2.5 units of Botox at 1site.     Right Lateral Trapezius - 5 units of Botox over 2 sites/s.  Left Lateral Trapezius - 5 units of Botox over 2 site/s.      Right Levator Scapulae - 10 units of Botox over 2 site/s.   Left Levator Scapulae - 10 units of Botox over 2 site/s.    Right Anterior Scalene - 2.5 units of Botox over 1 site/s.     Right Sternocleidomastoid - 2.5 units of Botox over 1 site/s.     3. HEAD & SCALP MUSCLES: 150 units Botox = Total Dose, 2:1 Dilution     Right Occipitalis - 5 units of Botox over 1 site/s.   Left Occipitalis - 5 units of Botox over 1 site/s.     Right Frontalis - 20 units of Botox over 4 site/s.  Left Frontalis - 20 units of Botox over 4 site/s.     Right Temporalis - 45 units of Botox over 8 site/s.  Left Temporalis - 45 units of Botox over 8 site/s.     Right  - 2.5 units of Botox over 1 site/s.              Left  - 2.5 units of Botox over 1 site/s.                 Procerus - 5 units of Botox at 1 site/s.      RESPONSE TO PROCEDURE:  Samara Oropeza tolerated the procedure well and there were no immediate complications.  She was allowed to recover for an appropriate period of time and was discharged home in stable condition.    FOLLOW UP:  Samara Oropeza was asked to follow up by phone in 7-14 days with Sharmila Lynn PT to report her response to this series of injections.  Based on the  patient's previous response to this therapy, Samara Oropeza was rescheduled for the next series of injections in 12 weeks.    PLAN (Medication Changes, Therapy Orders, Work or Disability Issues, etc.):  Patient will continue to monitor response to today's injections.       Again, thank you for allowing me to participate in the care of your patient.        Sincerely,        Alejandrina Hernandez MD

## 2022-03-24 NOTE — NURSING NOTE
Chief Complaint   Patient presents with     Botox     cervical dystonia and migraines           KRISHNA Ardon on 3/24/2022 at 11:36 AM

## 2022-04-13 DIAGNOSIS — M35.2 BEHCET'S DISEASE (H): ICD-10-CM

## 2022-04-15 NOTE — TELEPHONE ENCOUNTER
OTEZLA 30MG TABS  Last Written Prescription Date:  4/5/2021  Last Fill Quantity: 180,   # refills: 3  Last Office Visit :  9/30/2020  Future Office visit: None    Routing refill request to provider for review/approval because:  Over 18 months since last office visit  30 day pended and clinic/provider notified      Cristy Lake RN  Central Triage Red Flags/Med Refills

## 2022-04-16 RX ORDER — APREMILAST 30 MG/1
30 TABLET, FILM COATED ORAL 2 TIMES DAILY
Qty: 60 TABLET | OUTPATIENT
Start: 2022-04-16

## 2022-04-18 NOTE — TELEPHONE ENCOUNTER
My chart message sent to pt asking her to make appt for next available and Dr. Sosa will continue to fill until then.    Lori Miner RN  [facility]

## 2022-04-19 ENCOUNTER — TELEPHONE (OUTPATIENT)
Dept: RHEUMATOLOGY | Facility: CLINIC | Age: 27
End: 2022-04-19
Payer: COMMERCIAL

## 2022-04-19 NOTE — TELEPHONE ENCOUNTER
PA Initiation    Medication: Otezla- PA Pendig  Insurance Company: [employer] - Phone [phone] Fax [phone]  Pharmacy Filling the Rx: [facility] - [place], [place] - [address] [address] [facility] [address]  Filling Pharmacy Phone:    Filling Pharmacy Fax:    Start Date: 4/19/2022

## 2022-04-20 NOTE — TELEPHONE ENCOUNTER
Prior Authorization Approval    Authorization Effective Date: 3/19/2022  Authorization Expiration Date: 4/19/2023  Medication: Otezla- PA Approved  Approved Dose/Quantity: 60 per 30  Reference #: CMM Key: [id number]   Insurance Company: [employer] - Phone [phone] Fax [phone]  Expected CoPay:       CoPay Card Available:      Foundation Assistance Needed:    Which Pharmacy is filling the prescription (Not needed for infusion/clinic administered): [facility] - [place], [place] - [address]  Pharmacy Notified: Yes  Patient Notified: Yes

## 2022-05-24 ENCOUNTER — HOSPITAL ENCOUNTER (EMERGENCY)
Facility: CLINIC | Age: 27
Discharge: LEFT BEFORE DISCHARGE | End: 2022-05-24
Payer: COMMERCIAL

## 2022-05-24 VITALS
HEART RATE: 77 BPM | DIASTOLIC BLOOD PRESSURE: 97 MMHG | RESPIRATION RATE: 18 BRPM | BODY MASS INDEX: 24.8 KG/M2 | WEIGHT: 140 LBS | HEIGHT: 63 IN | OXYGEN SATURATION: 95 % | TEMPERATURE: 97.7 F | SYSTOLIC BLOOD PRESSURE: 143 MMHG

## 2022-05-25 NOTE — ED TRIAGE NOTES
Here for concern of sharp right lower back pain and abdominal pain started yesterday. Also stated that she stopped her period on Friday but started having vaginal bleeding again. Pain radiating to right shoulder and neck. ABCs intact.      Triage Assessment     Row Name 05/24/22 1913       Triage Assessment (Adult)    Airway WDL WDL       Respiratory WDL    Respiratory WDL WDL       Cardiac WDL    Cardiac WDL WDL

## 2022-06-08 DIAGNOSIS — M35.2 BEHCET'S DISEASE (H): ICD-10-CM

## 2022-06-09 NOTE — TELEPHONE ENCOUNTER
Otezla 30 mg tabs      Last Written Prescription Date:  4/18/22  Last Fill Quantity: 60,   # refills: 0  Last Office Visit : 9/30/20  Future Office visit:  10/28/22-rescheduled from 4/28/22    Routing refill request to provider for review/approval because:    Did not meet protocol.    MARIAN RabagoN, RN

## 2022-06-13 RX ORDER — APREMILAST 30 MG/1
TABLET, FILM COATED ORAL
Qty: 180 TABLET | Refills: 0 | Status: SHIPPED | OUTPATIENT
Start: 2022-06-13 | End: 2022-08-11

## 2022-06-16 ENCOUNTER — OFFICE VISIT (OUTPATIENT)
Dept: PHYSICAL MEDICINE AND REHAB | Facility: CLINIC | Age: 27
End: 2022-06-16
Payer: COMMERCIAL

## 2022-06-16 VITALS — DIASTOLIC BLOOD PRESSURE: 80 MMHG | SYSTOLIC BLOOD PRESSURE: 124 MMHG | HEART RATE: 96 BPM

## 2022-06-16 DIAGNOSIS — G43.719 CHRONIC MIGRAINE WITHOUT AURA, INTRACTABLE, WITHOUT STATUS MIGRAINOSUS: Primary | ICD-10-CM

## 2022-06-16 PROCEDURE — 96372 THER/PROPH/DIAG INJ SC/IM: CPT | Performed by: PHYSICAL MEDICINE & REHABILITATION

## 2022-06-16 PROCEDURE — 64615 CHEMODENERV MUSC MIGRAINE: CPT | Mod: 59 | Performed by: PHYSICAL MEDICINE & REHABILITATION

## 2022-06-16 RX ORDER — APIXABAN 5 MG (74)
KIT ORAL
COMMUNITY
Start: 2022-05-29 | End: 2022-10-28

## 2022-06-16 RX ORDER — HYDROXYZINE HYDROCHLORIDE 25 MG/1
TABLET, FILM COATED ORAL
Status: ON HOLD | COMMUNITY
Start: 2022-05-12 | End: 2023-02-12

## 2022-06-16 RX ORDER — RIZATRIPTAN BENZOATE 5 MG/1
5 TABLET ORAL
Qty: 10 TABLET | Refills: 1 | Status: ON HOLD | OUTPATIENT
Start: 2022-06-16 | End: 2023-02-12

## 2022-06-16 NOTE — LETTER
6/16/2022         RE: Samara Oropeza  [address]  [address]  [place] [facility] [place] [id number]        Dear Colleague,    Thank you for referring your patient, Samara Oropeza, to the [facility]. Please see a copy of my visit note below.      [facility]    PM&R CLINIC NOTE  BOTULINUM TOXIN PROCEDURE      HPI  Chief Complaint   Patient presents with     Botox     Cervical dystonia and migraines     Samara Oropeza is a 26 year old female who presents to clinic for botulinum toxin injections for management of chronic migraine headaches.     SINCE LAST VISIT  Samara Oropeza was last seen here in clinic on 3/24/2022, at which time she received 200 units of Botox.    Patient reports the following new medical problems since last visit: she was diagnosed with a left upper extremity DVT adn is now on blood thinners. She also had an ER visit for abdominal pain in 5/2022.     RESPONSE TO PREVIOUS TREATMENT    Side effects: No problems reported  Yes, see below  Post-procedural headache: Yes, she consistently gets a migraine 1-2 days after Botox. Since utilizing bupivacaine as a diluent with the Botox, her post-procedural migraines have become much more tolerable.       1.  Headache frequency during this injection cycle:  There was a period of time for about 6 weeks when she didn't get any headache days, however, 6 weeks into the injection cycle, she developed debilitating headaches approximately 3-4 times per week. This is compared to her baseline headache frequency of 30 headache days per month.     2.  Headache duration during this injection cycle:  Headache duration ranged from 1-4 days. Patient reports a few episodes of multiple day headaches during this injection cycle, particularly as the Botox was wearing off.     3.  Headache intensity during this injection cycle:    A.  6/10  =  Typical pain level.  B.  10/10  =  Worst pain level -  current headache.   C.  0/10  =  Lowest pain level.    4.  Change in headache medication usage during this injection cycle:  (For Example:  Able to decrease use of oral pain medications.) No changes, however, she was recently placed on blood thinners for a blood clot in her arm, and cannot take the migraine medications she used to take.     5.  ER Visits During This Injection Cycle:  No, however she almost went to the ER on 6/14/22 but was advised not to because she is still recovering from a blood clot in her arm.     6.  Functional Performance:  Change in ADL's, social interaction, days lost from work, etc. Patient reports being able to more fully participate in social and family activities and responsibilities as headache symptoms have improved        PHYSICAL EXAM  VS: /80 (BP Location: Left arm, Patient Position: Sitting)   Pulse 96    GEN: Pleasant and cooperative, in no acute distress  HEENT: No facial asymmetry    ALLERGIES  Allergies   Allergen Reactions     Amoxil [Penicillins] Rash     Dad unsure of reaction.     Bee Venom Anaphylaxis     Bioflavonoids Anaphylaxis     Citrus Anaphylaxis     All [place]     Contrast Dye Rash     Contrast Media [facility]09[place]2014.; Contrast [facility] 04[place]09[place].  NOTE: this is a contrast media oral with iodine. Premedicate with methylpred standard for IV contrast, request barium contrast for oral contrast.     Diagnostic X-Ray Materials Hives and Rash     Contrast Media [facility] 04[place]09[place]2014.; Contrast Media [facility] 04/09/2014.  NOTE: this is a contrast media oral with iodine. Premedicate with methylpred standard for IV contrast, request barium contrast for oral contrast.     Pineapple Anaphylaxis, Difficulty breathing and Rash     Reglan [Metoclopramide] Other (See Comments)     IV dose only, in ER, rapid heart rate.     Ace Inhibitors      Difficulty in breathing and GI upset     Amitiza [Lubiprostone] Nausea and Vomiting      Amoxicillin-Pot Clavulanate      Midazolam Unknown     parent states that when pt takes this medication, she wakes up being very violent .     Other [No Clinical Screening - See Comments]      Bleech/ chest tightness, itchy throat, swollen tongue, hives     Tizanidine Other (See Comments)     Confusion, back pain, photophobia, abdominal pain, shaking, anxious       Versed      Coming out of pelvic exam at age of 5, was kicking and screaming when coming out of the versed.     Adhesive Tape Rash     Azithromycin Hives and Rash     Cephalexin Itching and Rash     Sulfa Drugs Rash     Skin scarring       CURRENT MEDICATIONS    Current Outpatient Medications:      amitriptyline (ELAVIL) 25 MG tablet, TAKE 1 TABLET BY MOUTH EVERY NIGHT AT BEDTIME, Disp: 90 tablet, Rfl: 1     Apixaban Starter Pack (ELIQUIS DVT/PE STARTER PACK) 5 MG TBPK, Take by mouth as directed on starter pack., Disp: , Rfl:      artificial tears OINT ophthalmic ointment, 0.5 inch strip each eye at night, Disp: 1 Tube, Rfl: 11     benzocaine (TOPICALE XTRA) 20 % GEL, Apply as needed locally to mouth or nasal ulcers for pain; 4 times daily as needed, Disp: 30 g, Rfl: 1     betamethasone valerate (VALISONE) 0.1 % cream, Apply topically 2 times daily as needed , Disp: , Rfl:      bisacodyl (DULCOLAX) 5 MG EC tablet, Take 2 tablets (10 mg) by mouth daily as needed for constipation, Disp: 30 tablet, Rfl: 0     citalopram (CELEXA) 20 MG tablet, Take 1 tablet (20 mg) by mouth daily, Disp: 90 tablet, Rfl: 1     colchicine (COLCYRS) 0.6 MG tablet, TAKE 1 TABLET (0.6 MG) BY MOUTH 2 TIMES DAILY, Disp: 180 tablet, Rfl: 1     dexamethasone (DECADRON) 4 MG/ML injection, To be used by therapist during PT sessions., Disp: 30 mL, Rfl: 0     dicyclomine (BENTYL) 20 MG tablet, Take 1 tablet (20 mg) by mouth 4 times daily as needed, Disp: 120 tablet, Rfl: 3     diphenhydrAMINE (BENADRYL) 25 MG tablet, Take 1 tablet (25 mg) by mouth 3 times daily as needed (itching), Disp:  40 tablet, Rfl: 1     EPINEPHrine (EPIPEN 2-EAMON) 0.3 MG/0.3ML injection, Inject 0.3 mLs (0.3 mg) into the muscle as needed for anaphylaxis, Disp: 0.6 mL, Rfl: 3     furosemide (LASIX) 20 MG tablet, Take 1 tablet (20 mg) by mouth 3 times daily, Disp: 60 tablet, Rfl: 3     gabapentin (NEURONTIN) 300 MG capsule, TAKE ONE CAPSULE BY MOUTH THREE TIMES DAILY , Disp: 90 capsule, Rfl: 0     hydrOXYzine (ATARAX) 25 MG tablet, TAKE ONE OR TWO TABLETS BY MOUTH EVERY SIX HOURS AS NEEDED, Disp: , Rfl:      hypromellose (GENTEAL) 0.3 % SOLN, 1 drop every hour as needed for dry eyes , Disp: , Rfl:      lidocaine (LMX4) 4 % external cream, Apply topically once as needed for mild pain, Disp: 120 g, Rfl: 1     LINZESS 290 MCG capsule, TAKE 1 CAPSULE BY MOUTH EVERY DAY IN THE MORNING BEFORE BREAKFAST, Disp: 90 capsule, Rfl: 0     LORazepam (ATIVAN) 0.5 MG tablet, TAKE 1 TABLET BY MOUTH EVERY 6 HOURS AS NEEDED FOR ANXIETY, Disp: 10 tablet, Rfl: 0     ondansetron (ZOFRAN-ODT) 8 MG ODT tab, Take 1 tablet (8 mg) by mouth every 8 hours as needed for nausea, Disp: 180 tablet, Rfl: 1     OTEZLA 30 MG tablet, TAKE ONE TABLET BY MOUTH TWICE A DAY. HOLD FOR SIGNS OF INFECTION, AND SEEK MEDICAL ATTENTION., Disp: 180 tablet, Rfl: 0     polyethylene glycol (MIRALAX/GLYCOLAX) powder, Take 1 capful by mouth 3 times daily, Disp: , Rfl:      sodium fluoride 1.1 % CREA, Apply 1 Application topically daily, Disp: , Rfl:      sucralfate (CARAFATE) 1 GM/10ML suspension, Take 10 mLs (1 g) by mouth 4 times daily, Disp: 1200 mL, Rfl: 2     albuterol (PROAIR HFA/PROVENTIL HFA/VENTOLIN HFA) 108 (90 Base) MCG/ACT inhaler, Inhale 2 puffs into the lungs every 6 hours as needed for shortness of breath / dyspnea or wheezing (Patient not taking: Reported on 6/16/2022), Disp: 1 Inhaler, Rfl: 1     Apremilast (OTEZLA) 10 & 20 & 30 MG TBPK, Take by mouth according to the instructions on the packet. Hold for signs of infection,any GI upset, weight loss or depression  concerns, and seek medical attention., Disp: 1 each, Rfl: 0     cyproheptadine (PERIACTIN) 4 MG tablet, TAKE 2 TABLETS (8 MG) BY MOUTH AT BEDTIME FOR NIGHTMARES (Patient not taking: Reported on 6/16/2022), Disp: 180 tablet, Rfl: 1     diclofenac (VOLTAREN) 75 MG EC tablet, Take 1 tablet (75 mg) by mouth 2 times daily as needed for moderate pain (Patient not taking: No sig reported), Disp: 60 tablet, Rfl: 1     furosemide (LASIX) 40 MG tablet, TAKE ONE TABLET BY MOUTH TWICE DAILY  (Patient not taking: Reported on 3/24/2022), Disp: 60 tablet, Rfl: 0     guanFACINE (TENEX) 2 MG tablet, Take one and one half tabs in the morning and one and one half in the evening. (Patient not taking: Reported on 6/16/2022), Disp: 270 tablet, Rfl: 3     lactulose 20 GM/30ML SOLN, Take 30 mLs by mouth 3 times daily as needed (for constipation) (Patient not taking: No sig reported), Disp: 300 mL, Rfl: 3     lidocaine (LMX4) 4 % external cream, Apply 1 Application topically once as needed for mild pain, Disp: , Rfl:      order for DME, Equipment being ordered: Trilok brace 8 1/2 left lower extremity (Patient not taking: No sig reported), Disp: 1 Device, Rfl: 0     order for DME, Equipment being ordered: size 8 1/2 walking boot tall (Patient not taking: No sig reported), Disp: 1 Device, Rfl: 0     predniSONE (DELTASONE) 20 MG tablet, Take two tablets (= 40mg) each day for 4 (four) days (Patient not taking: No sig reported), Disp: 8 tablet, Rfl: 0     triamcinolone (KENALOG) 0.5 % external ointment, Apply 1 g topically 3 times daily as needed for irritation (Patient not taking: No sig reported), Disp: 15 g, Rfl: 3    Current Facility-Administered Medications:      botulinum toxin type A (BOTOX) 100 units injection 200 Units, 200 Units, Intramuscular, Q90 Days, Standal, Alejandrina Vera MD     lidocaine 1 % injection 0.5 mL, 0.5 mL, , , Andres Johnson DPM, 0.5 mL at 10/11/21 0928     lidocaine 1 % injection 0.5 mL, 0.5 mL, , ,  Andres Johnson, DPM, 0.5 mL at 06/15/21 0957     lidocaine 1 % injection 0.5 mL, 0.5 mL, , , Elizabeth, Andres, DPM, 0.5 mL at 09/15/20 155     methylPREDNISolone (DEPO-MEDROL) injection 40 mg, 40 mg, , , Yeo, Albert, MD, 40 mg at 20 1154     triamcinolone (KENALOG-40) injection 40 mg, 40 mg, , , John Johnsonemy, DPM, 40 mg at 10/11/21 0928     triamcinolone (KENALOG-40) injection 40 mg, 40 mg, , , Elizabeth, Andres, DPM, 40 mg at 06/15/21 0957     triamcinolone (KENALOG-40) injection 40 mg, 40 mg, , , Elizabeth, Andres, DPM, 40 mg at 09/15/20 1554       BOTULINUM NEUROTOXIN INJECTION PROCEDURES    VERIFICATION OF PATIENT IDENTIFICATION AND PROCEDURE     Initials   Patient Name SES   Patient  SES   Procedure Verified by: SES     Prior to the start of the procedure and with procedural staff participation, I verbally confirmed the patient s identity using two indicators, relevant allergies, that the procedure was appropriate and matched the consent or emergent situation, and that the correct equipment/implants were available. Immediately prior to starting the procedure I conducted the Time Out with the procedural staff and re-confirmed the patient s name, procedure, and site/side. (The Joint Commission universal protocol was followed.)  Yes    Sedation (Moderate or Deep): None    ABOVE ASSESSMENTS PERFORMED BY    Alejandrina Hernandez MD      INDICATIONS FOR PROCEDURES  Samara Oropeza is a 26 year old patient with pain secondary to the diagnosis of chronic migraine headaches. Her baseline symptoms have been recalcitrant to oral medications and conservative therapy.  She is here today for reinjection with Botox.    GOAL OF PROCEDURE  The goal of this procedure is to decrease pain .      TOTAL DOSE ADMINISTERED  Dose Administered:  200 units  Botox (Botulinum Toxin Type A)       2:1 Dilution   Unavoidable Drug Waste: No  Diluent Used:  0.25% bupivacaine (Batch: [id number], Exp: 2024, NDC:  27207-978-52)  Total Volume of Diluent Used:  4 ml  Lot # [id number] with Expiration Date:  09/2023  NDC #: Botox 100u ([address]-73)      CONSENT  The risks, benefits, and treatment options were discussed with Samara Oropeza and she agreed to proceed.    Written consent was obtained by [facility].     EQUIPMENT USED  Needle-25mm stimulating/recording  Needle-30 gauge  EMG/NCS Machine    SKIN PREPARATION  Skin preparation was performed using an alcohol wipe.    GUIDANCE DESCRIPTION  Electro-myographic guidance was necessary throughout the neck portion of the procedure to accurately identify all areas of spastic muscles while avoiding injection of non-spastic muscles, neighboring nerves and nearby vascular structures.     AREA/MUSCLE INJECTED:  200 UNITS BOTOX = TOTAL DOSE     1 & 2. SHOULDER GIRDLE & NECK MUSCLES: 50 units Botox = Total Dose, 2:1 Dilution      Right Splenius Cervicis - 5 units of Botox over 2 site/s.   Left Splenius Cervicis - 5 units of Botox over 2 site/s.     Right Rectus Capitis - 2.5 units of Botox at 1 site.  Left Rectus Capitis - 2.5 units of Botox at 1site.     Right Levator Scapulae - 10 units of Botox over 2 site/s.   Left Levator Scapulae - 10 units of Botox over 2 site/s.    Right Anterior Scalene - 2.5 units of Botox over 1 site/s.   Left Anterior Scalene - 2.5 units of Botox over 1 site/s.     Right Sternocleidomastoid - 2.5 units of Botox over 1 site/s.   Left Sternocleidomastoid - 2.5 units of Botox over 1 site/s.     Right Rhomboid Major - 5 units of Botox over 1 site/s.    Left Rhomboid Major - 5 units of Botox over 1 site/s.      Right Pectoralis Minor - 2.5 units of Botox over 1 site/s.    Left Pectoralis Minor - 2.5 units of Botox over 1 site/s.     3. HEAD & SCALP MUSCLES: 150 units Botox = Total Dose, 2:1 Dilution     Right Occipitalis - 5 units of Botox over 1 site/s.   Left Occipitalis - 5 units of Botox over 1 site/s.     Right Frontalis - 10 units of Botox over 2  site/s.  Left Frontalis - 10 units of Botox over 2 site/s.     Right Temporalis - 45 units of Botox over 8 site/s.  Left Temporalis - 45 units of Botox over 8 site/s.     Right  - 2.5 units of Botox over 1 site/s.              Left  - 2.5 units of Botox over 1 site/s.                 Procerus - 5 units of Botox at 1 site/s.      RESPONSE TO PROCEDURE  Samara Oropeza tolerated the procedure well and there were no immediate complications. She was allowed to recover for an appropriate period of time and was discharged home in stable condition.    ASSESSMENT AND PLAN   1. Botulinum toxin injections: No changes made to Botox dose or distribution today. Patient will continue to monitor response and report at next appointment.   2. Medications: A prescription for Maxalt was sent to her pharmacy for headache rescue, which patient will use as needed and report response.   3. Referrals: None.   4. Follow up: Samara Oropeza was rescheduled for the next series of injections in 12 weeks, at which time we will evaluate response to today's injections. she may call the clinic prior with any questions or concerns prior to the next appointment.           Again, thank you for allowing me to participate in the care of your patient.        Sincerely,        Alejandrina Hernandez MD

## 2022-06-16 NOTE — PROGRESS NOTES
[facility]    PM&R CLINIC NOTE  BOTULINUM TOXIN PROCEDURE      HPI  Chief Complaint   Patient presents with     Botox     Cervical dystonia and migraines     Samara Oropeza is a 26 year old female who presents to clinic for botulinum toxin injections for management of chronic migraine headaches.     SINCE LAST VISIT  Samara Oropeza was last seen here in clinic on 3/24/2022, at which time she received 200 units of Botox.    Patient reports the following new medical problems since last visit: she was diagnosed with a left upper extremity DVT adn is now on blood thinners. She also had an ER visit for abdominal pain in 5/2022.     RESPONSE TO PREVIOUS TREATMENT    Side effects: No problems reported  Yes, see below  Post-procedural headache: Yes, she consistently gets a migraine 1-2 days after Botox. Since utilizing bupivacaine as a diluent with the Botox, her post-procedural migraines have become much more tolerable.       1.  Headache frequency during this injection cycle:  There was a period of time for about 6 weeks when she didn't get any headache days, however, 6 weeks into the injection cycle, she developed debilitating headaches approximately 3-4 times per week. This is compared to her baseline headache frequency of 30 headache days per month.     2.  Headache duration during this injection cycle:  Headache duration ranged from 1-4 days. Patient reports a few episodes of multiple day headaches during this injection cycle, particularly as the Botox was wearing off.     3.  Headache intensity during this injection cycle:    A.  6/10  =  Typical pain level.  B.  10/10  =  Worst pain level - current headache.   C.  0/10  =  Lowest pain level.    4.  Change in headache medication usage during this injection cycle:  (For Example:  Able to decrease use of oral pain medications.) No changes, however, she was recently placed on blood thinners for a blood clot in her arm, and cannot  take the migraine medications she used to take.     5.  ER Visits During This Injection Cycle:  No, however she almost went to the ER on 6/14/22 but was advised not to because she is still recovering from a blood clot in her arm.     6.  Functional Performance:  Change in ADL's, social interaction, days lost from work, etc. Patient reports being able to more fully participate in social and family activities and responsibilities as headache symptoms have improved        PHYSICAL EXAM  VS: /80 (BP Location: Left arm, Patient Position: Sitting)   Pulse 96    GEN: Pleasant and cooperative, in no acute distress  HEENT: No facial asymmetry    ALLERGIES  Allergies   Allergen Reactions     Amoxil [Penicillins] Rash     Dad unsure of reaction.     Bee Venom Anaphylaxis     Bioflavonoids Anaphylaxis     Citrus Anaphylaxis     All [place]     Contrast Dye Rash     [place] [facility] [place] [facility] [place]  NOTE: this is a contrast media oral with iodine. Premedicate with methylpred standard for IV contrast, request barium contrast for oral contrast.     Diagnostic X-Ray Materials Hives and Rash     [facility]rast [facility] [place] [facility] [place]  NOTE: this is a contrast media oral with iodine. Premedicate with methylpred standard for IV contrast, request barium contrast for oral contrast.     Pineapple Anaphylaxis, Difficulty breathing and Rash     Reglan [Metoclopramide] Other (See Comments)     IV dose only, in ER, rapid heart rate.     Ace Inhibitors      Difficulty in breathing and GI upset     Amitiza [Lubiprostone] Nausea and Vomiting     Amoxicillin-Pot Clavulanate      Midazolam Unknown     parent states that when pt takes this medication, she wakes up being very violent .     Other [No Clinical Screening - See Comments]      Bleech/ chest tightness, itchy throat, swollen tongue, hives     Tizanidine Other (See Comments)      Confusion, back pain, photophobia, abdominal pain, shaking, anxious       Versed      Coming out of pelvic exam at age of 5, was kicking and screaming when coming out of the versed.     Adhesive Tape Rash     Azithromycin Hives and Rash     Cephalexin Itching and Rash     Sulfa Drugs Rash     Skin scarring       CURRENT MEDICATIONS    Current Outpatient Medications:      amitriptyline (ELAVIL) 25 MG tablet, TAKE 1 TABLET BY MOUTH EVERY NIGHT AT BEDTIME, Disp: 90 tablet, Rfl: 1     Apixaban Starter Pack (ELIQUIS DVT/PE STARTER PACK) 5 MG TBPK, Take by mouth as directed on starter pack., Disp: , Rfl:      artificial tears OINT ophthalmic ointment, 0.5 inch strip each eye at night, Disp: 1 Tube, Rfl: 11     benzocaine (TOPICALE XTRA) 20 % GEL, Apply as needed locally to mouth or nasal ulcers for pain; 4 times daily as needed, Disp: 30 g, Rfl: 1     betamethasone valerate (VALISONE) 0.1 % cream, Apply topically 2 times daily as needed , Disp: , Rfl:      bisacodyl (DULCOLAX) 5 MG EC tablet, Take 2 tablets (10 mg) by mouth daily as needed for constipation, Disp: 30 tablet, Rfl: 0     citalopram (CELEXA) 20 MG tablet, Take 1 tablet (20 mg) by mouth daily, Disp: 90 tablet, Rfl: 1     colchicine (COLCYRS) 0.6 MG tablet, TAKE 1 TABLET (0.6 MG) BY MOUTH 2 TIMES DAILY, Disp: 180 tablet, Rfl: 1     dexamethasone (DECADRON) 4 MG/ML injection, To be used by therapist during PT sessions., Disp: 30 mL, Rfl: 0     dicyclomine (BENTYL) 20 MG tablet, Take 1 tablet (20 mg) by mouth 4 times daily as needed, Disp: 120 tablet, Rfl: 3     diphenhydrAMINE (BENADRYL) 25 MG tablet, Take 1 tablet (25 mg) by mouth 3 times daily as needed (itching), Disp: 40 tablet, Rfl: 1     EPINEPHrine (EPIPEN 2-EAMON) 0.3 MG/0.3ML injection, Inject 0.3 mLs (0.3 mg) into the muscle as needed for anaphylaxis, Disp: 0.6 mL, Rfl: 3     furosemide (LASIX) 20 MG tablet, Take 1 tablet (20 mg) by mouth 3 times daily, Disp: 60 tablet, Rfl: 3     gabapentin (NEURONTIN)  300 MG capsule, TAKE ONE CAPSULE BY MOUTH THREE TIMES DAILY , Disp: 90 capsule, Rfl: 0     hydrOXYzine (ATARAX) 25 MG tablet, TAKE ONE OR TWO TABLETS BY MOUTH EVERY SIX HOURS AS NEEDED, Disp: , Rfl:      hypromellose (GENTEAL) 0.3 % SOLN, 1 drop every hour as needed for dry eyes , Disp: , Rfl:      lidocaine (LMX4) 4 % external cream, Apply topically once as needed for mild pain, Disp: 120 g, Rfl: 1     LINZESS 290 MCG capsule, TAKE 1 CAPSULE BY MOUTH EVERY DAY IN THE MORNING BEFORE BREAKFAST, Disp: 90 capsule, Rfl: 0     LORazepam (ATIVAN) 0.5 MG tablet, TAKE 1 TABLET BY MOUTH EVERY 6 HOURS AS NEEDED FOR ANXIETY, Disp: 10 tablet, Rfl: 0     ondansetron (ZOFRAN-ODT) 8 MG ODT tab, Take 1 tablet (8 mg) by mouth every 8 hours as needed for nausea, Disp: 180 tablet, Rfl: 1     OTEZLA 30 MG tablet, TAKE ONE TABLET BY MOUTH TWICE A DAY. HOLD FOR SIGNS OF INFECTION, AND SEEK MEDICAL ATTENTION., Disp: 180 tablet, Rfl: 0     polyethylene glycol (MIRALAX/GLYCOLAX) powder, Take 1 capful by mouth 3 times daily, Disp: , Rfl:      sodium fluoride 1.1 % CREA, Apply 1 Application topically daily, Disp: , Rfl:      sucralfate (CARAFATE) 1 GM/10ML suspension, Take 10 mLs (1 g) by mouth 4 times daily, Disp: 1200 mL, Rfl: 2     albuterol (PROAIR HFA/PROVENTIL HFA/VENTOLIN HFA) 108 (90 Base) MCG/ACT inhaler, Inhale 2 puffs into the lungs every 6 hours as needed for shortness of breath / dyspnea or wheezing (Patient not taking: Reported on 6/16/2022), Disp: 1 Inhaler, Rfl: 1     Apremilast (OTEZLA) 10 & 20 & 30 MG TBPK, Take by mouth according to the instructions on the packet. Hold for signs of infection,any GI upset, weight loss or depression concerns, and seek medical attention., Disp: 1 each, Rfl: 0     cyproheptadine (PERIACTIN) 4 MG tablet, TAKE 2 TABLETS (8 MG) BY MOUTH AT BEDTIME FOR NIGHTMARES (Patient not taking: Reported on 6/16/2022), Disp: 180 tablet, Rfl: 1     diclofenac (VOLTAREN) 75 MG EC tablet, Take 1 tablet (75 mg) by  mouth 2 times daily as needed for moderate pain (Patient not taking: No sig reported), Disp: 60 tablet, Rfl: 1     furosemide (LASIX) 40 MG tablet, TAKE ONE TABLET BY MOUTH TWICE DAILY  (Patient not taking: Reported on 3/24/2022), Disp: 60 tablet, Rfl: 0     guanFACINE (TENEX) 2 MG tablet, Take one and one half tabs in the morning and one and one half in the evening. (Patient not taking: Reported on 6/16/2022), Disp: 270 tablet, Rfl: 3     lactulose 20 GM/30ML SOLN, Take 30 mLs by mouth 3 times daily as needed (for constipation) (Patient not taking: No sig reported), Disp: 300 mL, Rfl: 3     lidocaine (LMX4) 4 % external cream, Apply 1 Application topically once as needed for mild pain, Disp: , Rfl:      order for DME, Equipment being ordered: Trilok brace 8 1/2 left lower extremity (Patient not taking: No sig reported), Disp: 1 Device, Rfl: 0     order for DME, Equipment being ordered: size 8 1/2 walking boot tall (Patient not taking: No sig reported), Disp: 1 Device, Rfl: 0     predniSONE (DELTASONE) 20 MG tablet, Take two tablets (= 40mg) each day for 4 (four) days (Patient not taking: No sig reported), Disp: 8 tablet, Rfl: 0     triamcinolone (KENALOG) 0.5 % external ointment, Apply 1 g topically 3 times daily as needed for irritation (Patient not taking: No sig reported), Disp: 15 g, Rfl: 3    Current Facility-Administered Medications:      botulinum toxin type A (BOTOX) 100 units injection 200 Units, 200 Units, Intramuscular, Q90 Days, StandalAlejandrina MD     lidocaine 1 % injection 0.5 mL, 0.5 mL, , , Andres Johnson DPM, 0.5 mL at 10/11/21 0928     lidocaine 1 % injection 0.5 mL, 0.5 mL, , , Andres Johnson DPM, 0.5 mL at 06/15/21 0957     lidocaine 1 % injection 0.5 mL, 0.5 mL, , , Andres Johnson DPM, 0.5 mL at 09/15/20 1554     methylPREDNISolone (DEPO-MEDROL) injection 40 mg, 40 mg, , , Yeo, Albert, MD, 40 mg at 09/29/20 1154     triamcinolone (KENALOG-40) injection 40 mg, 40 mg, ,  , Fleischmann, Andres, DPM, 40 mg at 10/11/21 0928     triamcinolone (KENALOG-40) injection 40 mg, 40 mg, , , Fleischmann, Andres, DPM, 40 mg at 06/15/21 0957     triamcinolone (KENALOG-40) injection 40 mg, 40 mg, , , Fleischmann, Andres, DPM, 40 mg at 09/15/20 1554       BOTULINUM NEUROTOXIN INJECTION PROCEDURES    VERIFICATION OF PATIENT IDENTIFICATION AND PROCEDURE     Initials   Patient Name SES   Patient  SES   Procedure Verified by: SES     Prior to the start of the procedure and with procedural staff participation, I verbally confirmed the patient s identity using two indicators, relevant allergies, that the procedure was appropriate and matched the consent or emergent situation, and that the correct equipment/implants were available. Immediately prior to starting the procedure I conducted the Time Out with the procedural staff and re-confirmed the patient s name, procedure, and site/side. (The Joint Commission universal protocol was followed.)  Yes    Sedation (Moderate or Deep): None    ABOVE ASSESSMENTS PERFORMED BY    Alejandrina Hernandez MD      INDICATIONS FOR PROCEDURES  Samara Oropeza is a 26 year old patient with pain secondary to the diagnosis of chronic migraine headaches. Her baseline symptoms have been recalcitrant to oral medications and conservative therapy.  She is here today for reinjection with Botox.    GOAL OF PROCEDURE  The goal of this procedure is to decrease pain .      TOTAL DOSE ADMINISTERED  Dose Administered:  200 units  Botox (Botulinum Toxin Type A)       2:1 Dilution   Unavoidable Drug Waste: No  Diluent Used:  0.25% bupivacaine (Batch: [id number], Exp: 2024, NDC: 55150-167-10)  Total Volume of Diluent Used:  4 ml  Lot # [id number] with Expiration Date:  2023  NDC #: Botox 100u ([address]-88)      CONSENT  The risks, benefits, and treatment options were discussed with Samara Oropeza and she agreed to proceed.    Written consent was obtained by [facility].     EQUIPMENT  USED  Needle-25mm stimulating/recording  Needle-30 gauge  EMG/NCS Machine    SKIN PREPARATION  Skin preparation was performed using an alcohol wipe.    GUIDANCE DESCRIPTION  Electro-myographic guidance was necessary throughout the neck portion of the procedure to accurately identify all areas of spastic muscles while avoiding injection of non-spastic muscles, neighboring nerves and nearby vascular structures.     AREA/MUSCLE INJECTED:  200 UNITS BOTOX = TOTAL DOSE     1 & 2. SHOULDER GIRDLE & NECK MUSCLES: 60 units Botox = Total Dose, 2:1 Dilution      Right Splenius Cervicis - 5 units of Botox over 2 site/s.   Left Splenius Cervicis - 5 units of Botox over 2 site/s.     Right Rectus Capitis - 2.5 units of Botox at 1 site.  Left Rectus Capitis - 2.5 units of Botox at 1site.     Right Levator Scapulae - 10 units of Botox over 2 site/s.   Left Levator Scapulae - 10 units of Botox over 2 site/s.    Right Anterior Scalene - 2.5 units of Botox over 1 site/s.   Left Anterior Scalene - 2.5 units of Botox over 1 site/s.     Right Sternocleidomastoid - 2.5 units of Botox over 1 site/s.   Left Sternocleidomastoid - 2.5 units of Botox over 1 site/s.     Right Rhomboid Major - 5 units of Botox over 1 site/s.    Left Rhomboid Major - 5 units of Botox over 1 site/s.      Right Pectoralis Minor - 2.5 units of Botox over 1 site/s.    Left Pectoralis Minor - 2.5 units of Botox over 1 site/s.     3. HEAD & SCALP MUSCLES: 140 units Botox = Total Dose, 2:1 Dilution     Right Occipitalis - 5 units of Botox over 1 site/s.   Left Occipitalis - 5 units of Botox over 1 site/s.     Right Frontalis - 10 units of Botox over 2 site/s.  Left Frontalis - 10 units of Botox over 2 site/s.     Right Temporalis - 50 units of Botox over 8 site/s.  Left Temporalis - 50 units of Botox over 8 site/s.     Right  - 2.5 units of Botox over 1 site/s.              Left  - 2.5 units of Botox over 1 site/s.                 Procerus - 5 units  of Botox at 1 site/s.      RESPONSE TO PROCEDURE  Samara Oropeza tolerated the procedure well and there were no immediate complications. She was allowed to recover for an appropriate period of time and was discharged home in stable condition.    ASSESSMENT AND PLAN   1. Botulinum toxin injections: No changes made to Botox dose or distribution today. Patient will continue to monitor response and report at next appointment.   2. Medications: A prescription for Maxalt was sent to her pharmacy for headache rescue, which patient will use as needed and report response.   3. Referrals: None.   4. Follow up: Samara Oropeza was rescheduled for the next series of injections in 12 weeks, at which time we will evaluate response to today's injections. she may call the clinic prior with any questions or concerns prior to the next appointment.

## 2022-06-16 NOTE — NURSING NOTE
Chief Complaint   Patient presents with     Botox     Cervical dystonia and migraines       KRISHNA Ardon on 6/16/2022 at 2:18 PM

## 2022-07-21 DIAGNOSIS — M35.2 BEHCET'S DISEASE (H): Primary | ICD-10-CM

## 2022-07-21 NOTE — TELEPHONE ENCOUNTER
[facility] Call Center    Phone Message    May a detailed message be left on voicemail: yes     Reason for Call: Other: Pt is calling to talk to someone about her medications. Pt just found out she is 7 weeks pregnant and wants to discuss what she can and cannot take. Pt also states that her OB told her to see Dr Sosa soon. Pt would like a call back at [phone].     Action Taken: Message routed to:  [facility] ([facility]    Travel Screening: Not Applicable

## 2022-07-22 NOTE — TELEPHONE ENCOUNTER
Called and spoke with pt, she is taking Otezla and colchicine right now.     She is having a bit of a rough time, as her lesions started returning about 2 months ago, oral and skin lesions.  The oral lesions started subsiding, but skin lesions remain active.  She said her RA has also been acting up a lot, joint pain and swelling, stiffness in the am.  She did have surgery on thumb and wrist, release on thumb and ulnar nerve tunneling on wrist.  Since then her RA has been troubling her.    Pt did have a miscarriage a year and half ago. This will be her first baby.    Discussed with Dr. Sosa, she would like her to stop Colchicine and Otezla for now.  Will give her a video visit on 8/11/22.      Called and spoke with pt, she is concerned, because as soon as she stop otezla and colchcine, her ulcers start and she has already been flaring.  She is not currently on any prednisone. Did let her know that Dr. Sosa will look into what to do for her and we will get back to her and let her know. She has accepted an appt on 8/11 at 3:30 pm for a virtual visit.    Lori Miner RN  Rheumatology Clinic

## 2022-07-25 NOTE — TELEPHONE ENCOUNTER
Dominga Sosa MD Beard, Lori, RN  Caller: Unspecified (4 days ago,  2:42 PM)  I recommend going on prednisone 10 mg every day while holding colcrys+otezla till I see her in Aug. Should start ASA 81 mg a day to prevent pre-eclampsia. Recommend follow up with derm (better to be rheum-derm)+seeing MFM.     Left message requesting return call.    Lori Miner RN  Rheumatology Clinic

## 2022-07-26 NOTE — TELEPHONE ENCOUNTER
Called and spoke with pt regarding Dr. Sosa's message. She will start prednisone 10 mg a day, she said she is not supposed to take asa as she is on eliquis for a DVT in her arm.      She will see Dr. Tracey at the end of August and has a follow up with MFM next week.    Lori Miner RN  Rheumatology Clinic

## 2022-07-27 ENCOUNTER — TELEPHONE (OUTPATIENT)
Dept: DERMATOLOGY | Facility: CLINIC | Age: 27
End: 2022-07-27

## 2022-07-27 NOTE — TELEPHONE ENCOUNTER
Dominga Sosa MD Beard, JESSY Sandoval  Caller: Unspecified (6 days ago,  2:42 PM)  She is not because the purpose of ASA is to prevent pre-eclampsia. ASA could be started later in pregnancy and since she is on blood thinner and the combination of eliquis+ASA could increase risk of bleeding, I advise her to hold off starting ASA and let MFM decides when she could start it.     Just a note, insufficient data about use of eliquis in pregnancy and lovenox is preferred, she should let the prescribing provider know that she is pregnant.     Called and updated pt with Dr. Sosa's response.  Pt understands, she will let hematology and MFM know.    Lori Miner RN  Rheumatology Clinic

## 2022-07-27 NOTE — TELEPHONE ENCOUNTER
Scheduled.    ----- Message from Lori Miner RN sent at 7/26/2022  5:05 PM CDT -----  Regarding: Derm  Action Needed --  I've placed a hold that needs scheduling. Please see below.  Department: Derm  Provider: Dr. Tracey  Date/Time: 8/26/22 11:00 video  RFV: Behcets + pregnancy  Referring Provider: Dr. Sosa    Pt aware of date and time of appt.    Thanks  Lori

## 2022-07-29 RX ORDER — PREDNISONE 10 MG/1
10 TABLET ORAL DAILY
Qty: 30 TABLET | Refills: 1 | Status: SHIPPED | OUTPATIENT
Start: 2022-07-29 | End: 2022-08-11

## 2022-08-10 NOTE — TELEPHONE ENCOUNTER
FUTURE VISIT INFORMATION      FUTURE VISIT INFORMATION:    Date: 8.26.22    Time: 11:00    Location: Video  REFERRAL INFORMATION:    Referring provider:  Sheila    Referring providers clinic:  Rheumatology    Reason for visit/diagnosis  RFV: Behcets + pregnancy Referring Provider: Dr. Sosa     RECORDS REQUESTED FROM:       Clinic name Comments Records Status   Rheum 7.27.22, 9.30.20  Sheila Puga

## 2022-08-11 ENCOUNTER — VIRTUAL VISIT (OUTPATIENT)
Dept: RHEUMATOLOGY | Facility: CLINIC | Age: 27
End: 2022-08-11
Attending: INTERNAL MEDICINE
Payer: COMMERCIAL

## 2022-08-11 DIAGNOSIS — M35.2 BEHCET'S DISEASE (H): ICD-10-CM

## 2022-08-11 DIAGNOSIS — K13.79 RECURRENT ORAL ULCERS: Primary | ICD-10-CM

## 2022-08-11 PROCEDURE — 99215 OFFICE O/P EST HI 40 MIN: CPT | Mod: GT | Performed by: INTERNAL MEDICINE

## 2022-08-11 RX ORDER — APREMILAST 30 MG/1
TABLET, FILM COATED ORAL
Qty: 180 TABLET | Refills: 0 | Status: SHIPPED | OUTPATIENT
Start: 2022-08-11 | End: 2022-10-11

## 2022-08-11 RX ORDER — TRIAMCINOLONE ACETONIDE 0.1 %
PASTE (GRAM) DENTAL 2 TIMES DAILY
Qty: 5 G | Refills: 5 | Status: ON HOLD | OUTPATIENT
Start: 2022-08-11 | End: 2023-02-12

## 2022-08-11 NOTE — PATIENT INSTRUCTIONS
Otezla twice a day after 12 weeks of pregnancy    Kenalog paste for mouth ulcers    Keep Oct appointment with me

## 2022-08-11 NOTE — PROGRESS NOTES
Samara Oropeza  is being evaluated via a billable video visit.      How [facility]  For the video visit, send the invitation by: Text to cell phone: [phone]   Will anyone else be joining your video visit? 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 Lilliana Miramontes- complicated migraine  # On Sprintec for birth control   # Borderline transaminase elevation    12/18 Basic blood cell counts, liver and kidney function labs within normal limits    Meets ISG 1990 criteria for Behcets. Initially, very good response to colchicine which has reduced the intensity and frequency of the oral ulcers in the past. Patient relapsed with genital ulcers and few oral ulcers in the end of 2016 and also with folliculitis in skin. Seen Derm Dr. Elliott. He recommended otezla. Otezla is working for her and she takes twice daily 30mg PO daily. Chest pain , mouth sores, hand pain, morning pain are all better when she tried otezla 30mg twice daily. Currently off of otezla since about 8/18 because of frequent UTI per patient. Colchicine dose was reduced recently in hospital to 0.3mg  daily per patient. Patient reports this was regarding antibiotic interaction. As patient's behcet's is flaring up I recommend increasing colchicine back to 0.6mg BID. No interaction with levaquin. If genital lesions do not improve, then see gynecology.     Neurology investigated for seizures and cause. Hospitalized 12/17. 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     Has tried prednisone in the past, but does not tolerate well due to mood issues, and has been off prednisone for the past 6+ months. Has H/o Tourettes. Followed by Dr. Miramontes.     She continues to have GI symptoms  Colonoscopy and endoscopy negative 2018.  Has gastroparesis.  Behcets may have GI involvement but no evidence of GI inflammation at this time. Dr. Baker has evaluated her. Dr. Rollins did the endoscopies. Her abdominal pain is not related to otezla/colchicine as her abdominal symptoms were present even before they were started. This was verified with the patient.  Patient seeing [facility] currently. [facility] suggested 6 week therapy in [place] for pelvic floor muscles. Patient is not able to afford that and thinking of alternative options.     She gets visual symptoms  But there is no evidence of uveitis including posterior uveitis or retinal vasculitis, denies symptoms at today's visit. She has seen Dr. Garcia in the past and also seen Dr. Heaton around 2/16 and 9/17. Patient still getting double vision and floaters but 9/17 eye exam was stable.     She also likely has fibromyalgia. Currently managing her behcets.     For chest symptoms, she was referred to pulmonology by GI. CT chest negative for any lung issues 1/18    - Continue oral gel/Triamcinolone acetonide cream (0.1 percent in [employer]) three to four times daily during attacks of oral ulcers and be used until pain from the ulcer ceases. Potent topical corticosteroids may be used for genital ulcers. Also use magic mouthwash as needed.      Today 8/11/2022:    She is  pregnant 10 wk 3 days with ADRIAN 3/6/2023. Off colcrys. Stopped otezla with positive pregnancy test but resumed last week after Behcet flare after discussion with MFM, but at 1 tab every day.     I informed her that per ACR reproductive guideline, there is not enough data about safely use of otezla during pregnancy. After discussing risks/benefits given uncontrolled flare off otezla, Samara decided to stay on otezla during pregnancy to manage flare. I advised her to take otezla after 12 weeks of pregnancy to minimize risk to the fetus, she is ok to wait.        Plan:    Otezla twice a day after 12 weeks of pregnancy    Kenalog paste for mouth ulcers    Keep Oct appointment with me    Keep rheum/derm appointment    Follow up with MFM    Return visit (virtual) in 3 months      Dominga Sosa MD            Active Problem List:     Patient Active Problem List    Diagnosis Date Noted     Infection of joint of ankle (H) 05/06/2019     Priority: Medium     Cellulitis 03/09/2019     Priority: Medium     Displacement of lumbar intervertebral disc without myelopathy 11/13/2018     Priority: Medium     Overview:   Created by Conversion       Iron deficiency associated with nonfamilial restless legs syndrome 11/13/2018     Priority: Medium     Enthesopathy of hip region 11/13/2018     Priority: Medium     Overview:   Created by Conversion       Tourette's syndrome 11/13/2018     Priority: Medium     Overview:   Created by Conversion       Somatic symptom disorder 06/01/2018     Priority: Medium     Pelvic floor weakness 04/25/2018     Priority: Medium     Spells of decreased attentiveness 12/19/2017     Priority: Medium     Mobile cecum 11/09/2017     Priority: Medium     Cecum noted in Right lower quadrant on 4/17 CT scan, and in Left upper Quadrant on CT on 11/2017.       Vitamin D deficiency 10/11/2017     Priority: Medium     How low, unknown/not found. On D when tested at 28. Starting cholecalciferol October 2017. Needs  recheck.       Convulsions, unspecified convulsion type (H) 10/03/2017     Priority: Medium     Transient alteration of awareness 10/03/2017     Priority: Medium     Chronic pain syndrome 07/27/2017     Priority: Medium     Major depressive disorder, recurrent episode, moderate (H) 06/27/2017     Priority: Medium     Cervical pain 05/02/2017     Priority: Medium     Acute left ankle pain 03/31/2017     Priority: Medium     Cervical dystonia 03/28/2017     Priority: Medium     PTSD (post-traumatic stress disorder) 01/17/2017     Priority: Medium     Patellofemoral instability 10/20/2016     Priority: Medium     Fibromyalgia 08/04/2016     Priority: Medium     Rheumatoid arthritis of multiple sites without rheumatoid factor (H) 08/04/2016     Priority: Medium     Raynaud's disease without gangrene 08/04/2016     Priority: Medium     Chronic abdominal pain 08/04/2016     Priority: Medium     Palpitations 01/12/2016     Priority: Medium     On colchicine therapy 10/30/2015     Priority: Medium     Spell of shaking 05/06/2015     Priority: Medium     Migraine 02/04/2015     Priority: Medium     Behcet's disease (H) 12/10/2014     Priority: Medium     Headaches due to old head injury 11/12/2013     Priority: Medium     Milk protein intolerance 10/11/2013     Priority: Medium     Intestinal malabsorption 10/11/2013     Priority: Medium     Concussion 02/13/2013     Priority: Medium     Jan 2013, with prolonged recovery- followed by sports med         Knee pain 01/03/2013     Priority: Medium     Generalized anxiety disorder 06/25/2009     Priority: Medium     Tics - Tourette syndrome 05/18/2009     Priority: Medium     Followed by psychotherapy. Symptoms well managed. Originally diagnosed at [facility]. (Dr. Simpson)           IBS (irritable bowel syndrome) 05/18/2009     Priority: Medium     Allergic rhinitis 05/18/2009     Priority: Medium     GERD (gastroesophageal reflux disease) 01/10/2008     Priority: Medium      Gastroparesis 1995     Priority: Medium          History of Present Illness:     No chief complaint on file.    Seen Dr. Marilyn Moran Rheumatology in [facility] 10/14:    Original presentation 2014:    Ms Oropeza is a 19 y.o. female who presents with one year of presentation of painful oral ulcers (monthly) once that have worsened with two episodes in the last two months that presented with painful vulvar or vaginal ulcers (2 episodes so far every month) [not sure if they leave scar] as well that timing coincides with menses ([facility]: 8/25/14).   ORAL ULCERS: last two episodes were severe, painful, white base with erythematous halo, that appear and disappear in the matter of 1-2 weeks without, does not bleed and doesn't respond to any treatment used (magic mouthwash), 10-15 in number with the biggest one being dime size.     VAGINAL ULCERS: 2-3 in number between labia majora and minora that occur simultaneously with oral ulcers, painful, non bleeding ulcers that are erythematous, no pus.     FOLLICULITIS: patient reports having spots in legs specially around ankle and knee, but arms, and neck are affected as well. Small lesions that resemble ingrown hair, most are red erythematous elevated lesions, well demarcated, some with liquid that patient refers as pimple like.     SKIN RASHES: welts and red macules with well defined borders that are pruritic and non-ulcerative on chest, arms and back. Benadryl relieves.     ARTHRALGIAS: In descending order of severity: hips, knees, wrists, shoulders, neck, lumbar, fingers.   C/o morning stiffness in hips, back and neck lasting for about until noon.     Ms. Oropeza reports they present in severe flares and never fully resolve, but do get better. She has seen some swelling and warmth on the affected joints but has not noticed and redness. Has tried Tylenol, ibuprofen, and hydrocodone with not much benefit.     FEVERS: Reports 3-4 sporadic episodes per month of self limited fevers of  38 C that occur during the evenings and associate facial flushing.     HEADACHES: occur daily and are bitemporal and irradiate occipitally, pulsatile in nature, intensity varies from intense to mild, are worsened with movement. On treatment with ibuprofen and somatriptan without any positive results.     VISION CHANGES: Patient reports that suddenly she would only see a gray blotch in her right eyes and would persist like this for a day and a half. Also reports blurriness in her left eye. Presence of pain and burning sensation of eyeball with associated redness. Has changed prescription glasses in the matter of 2 years 3 times. She has had opthalmology exam and was not suggestive of any evidence of uveitis.   She was also evaluated in ED for a dizzy spell.     ABD PAIN W/ INTERMITTENT DIARRHEA AND CONSTIPATION: Patient reports abdominal pain that is intermittent, periods of 7 days without bowel movement and feeling bloated with change of pattern to diarrhea (liquidy without blood nor mucus, non foul smelling). Has taken Bentyl, Zegrid, and rinetidine with mild clinical improvement.  She also reports small red nodules after each needle stick for blood draw.   Neurology saw her back then and was not impressed with her presentation as physical examination was normal. Also MRI brain normal: Findings: No definite hemorrhage, mass affect, midline shift, or ventriculomegaly is noted.There are a few scattered non specific white matter T2 hyperintensities. Axial diffusion weighted images are unremarkable.     The major vascular structures appear patent. There is opacification and severe mucosal thickening in the right maxillary sinus. The remaining paranasal sinuses, and mastoid air cells are clear. Orbits are unremarkable  She has + HSV-1 IGG. Seen ID. Negative for Herpes simplex virus culture. HSV IGM I/II COMBINATION SENT TO [facility]  RESULTS = <0.91  REF RANGE: <0.91 = NEGATIVE  ID did not think HSV could explain her  mouth and genital sores.     CRP within normal limits. HIV negative. CBC within normal limits; UA negative. Creatinine within normal limits   CYNDIE neg.  Antigliadin neg.   ANti TTG neg.   [facility] 2014: Colonoscopy and endoscopy neg; result not available. Not sure if biopsies were done.   Raynaud's phenomenon:  Onset: about 2013  Digits: all fingers  Digital ulcers: no  Color changes: white ---> purple and red  Duration of an attack: About couple of hours per mom  Pain during attack: not clear pain but bruise like feeling  Frequency of attacks: few times a month  Family history of Raynauds: no  GERD: very long time    No hemoptysis.   No miscarriages/ no thrombosis in past.   No family or personal history of psoriasis, ulcerative colitis or chron's disease.   No buttock pain or low back pain or stiffness in AM    Interim history:  Dec 2014- Multiple mouth ulcers and did not eat for 5 days. This coincided with periods. Colchicine 0.6mg PO BID started in Nov 2014.   Prednisone taper in Dec 20mg to 0 in 4 weeks. She also used magic mouthwash. Kenalog cream. After going to the ER, 3 days later her ulcers were better. She thinks it improved after the menstruation stopped.   LMP 3 weeks ago.   COLCHICINE HAS HELPED A LOT REDUCING THE INTENSITY OF MENSTRUATION ASSOCIATED ORAL AND VULVAR ULCERS.     Interim history: 6/15    Since last visit only two episodes of mouth sores- small sized 6   No genital ulcers since last visit.   Colchicine 1.2 mg in AM and 0.6mg in PM keeps her symptoms under control.     Admitted in 5/15:  Admission Diagnoses:  - LUE weakness  - spell  - hx of migraines  - hx of Tourette syndrome  - hx of Behcet's disease    Discharge Diagnoses:   - spell likely 2/2 anxiety  - hx of migraines  - hx of Tourette syndrome  - hx of Behcet's disease    CT and MRI brain was normal.     Seeing Dr. Lilliana Miramontes soon as outpatient.     Dr. Garcia did not find any intraocular inflammation.      Interm 10/30/2015  ED  "for migraine. Continues to see neuro - MRI brain without lesions noted. Saw GI - upper barium normal. May be considering repeat colo. Worsening lesions in mouth and genitals. Has had continuous lesion in mouth x 2 months. 2 genital ulcers. Had fracture of right sesamoid in foot. Continues to have low grade fevers. New folliculitis on chest, legs and arms.     Interim hx: 1/11/2016    Pt states that since last visit she continues to have oral ulcers and has noted more folliculitis-like lesions on her lower extremities.  She states that on her right lower leg she had a red macular spot that has since resolved.  She denies uveitis.  She states that the colchicine initially helped but then stopped working.  She takes 0.6 mg TID.  She stopped taking her prednisone last week as she feels like this hasn't helped.  She hasn't had any episodes of vaginal ulcers.      She saw GI who stated she has gastroparesis.  She is seeing Cardiology later this week for possible syncopal episodes.      Interim history 5/16  Mouth ulcers X 1 last month  Genital ulcers - none since last visit.     Bilateral MCPs pain, knee pain and low back pain +ve increased since last visit  Morning stiffness X 2 hours (increasing)   5-6/10    \"overall myalgia\" has gotten worse    C/o pseudofolliculitis lesion on anterior shins bilaterally worse    Interim history: (7/18/2016)  Patient has just started Humira for 2 doses on 7/1 and 7/15 and reported minimal improvement of her hip pain but otherwise, did not notice anything different.     She reported painful mouth ulcer once a month since last visit but noticed that it has been getting deeper.   Denied any genital ulcers.    Still has ongoing bilateral MCPs pain, knee pain but this has been intermittent and she did not have any much pain today. She reported 2-3 hours morning stiffness of both hands and pain score of 6/10 at her knees and 8/10 of her hands but currently takes no pain medication except prn " ativan which she takes when her muscle is really tight.     The only concern is that she gained weight even though she has not been eating much (eat once a day) and do exercises every day for 1 hour (with video of yoga, pilates). Her mother wonders if she needs to have some labs work up include sex hormone, thyroid, cortisol.    Interval history 9/14/16  Patient was started Humira at the last visit, patient reports worsening of skin ulcers since starting Humira and stopped taking Humura for the past 2 weeks. Patient reports oral and genital ulcers has been stable even before starting Humira and has not had any interval oral/genital ulcers. However she reports recurrent skin ulcers occurring on a daily basis that affects her chest and anterior legs. Patient also has stopped taking prednisone, she reports that she doesn't feel the prednisone helps, her last dose of prednisone was 6+ months ago. Patient also report worsening low back pain that sometimes causes her to limp.    In the interval patient also visited ED on 8/31/16 for left hand pain and what the patient describes as phlebitis, no medication or procedure was done and the patient was discharged with a splint. Patient also reported 1 episode of chest pressure that was associated with dyspnea and numbness of the left arm, she was shopping in a supermarket at the time of onset, and the pressure resolved after she took a nap.    Patient denies any infectious symptoms in the interval, no fever, chills, night sweat, cough, dysuria, denies eye pain or visual changes.    November 4, 2016  Oct - had one genital ulcer  Oral ulcers X 5- around menstruation time.   Knee pain- chronic on the left side  Dizziness spells - on and off; been to the ER for that in the past.   On and off migraines  July 2013 - s/p MPFL reconstruction plus LRL 7/2013  Morning stiffness in knee (left) X 1 hour    Severe jaw pain and chest pain- patient wondering if this is Tourette's or  esophageal spasms. Cardiac workup negative.   Fever     January 13, 2017  Yesterday in ER [facility] [place] with orthostatic syncope from dehydration.   Chest pain on and off still continues. Painful with deep breaths.   Oral ulcers - few minor ones lasting for one week;   One major one in roof of mouth lasting for about one week.   Knee pain +ve - reviewed the recent MRI with her orthopedic surgeon.   On and off migraines  otezla is approved. Still waiting to receive the meds.     March 31, 2017  Otezla current dose 15mg PO BID.   She is tolerating okay at this dose and is willing to increase the dose further up to 30mg BID.   Her joint pains are better on otezla. Knee pain is also better.   Back and neck pain +ve 8/10 when it is worse. Intramuscular botox injections were given on Monday in PMR.   2 minor genital ulcers in the interim- resolved.   Few oral ulcers in the interim- resolved.   Nasal ulcer - resolved.   Migraines on and off.   Nausea with otezla but improving.   Dizziness and blackouts on and off. She fell once and hurt her ankle. Wearing boot in left leg.   Complete ROS negative except for above    May 17, 2017  Tolerating otezla better. Not throwing up anymore. Current dose 20mg in AM and 30mg in PM (2nd week).   Patient thinks that her oral ulcers frequency and severity are improving with otezla. Also no genital ulcers in the interim.   Currently on doxycycline for bronchitis/?pneunomia. 4 more days left.     Joint pain history  2 weeks ago/ dx with pneumonia 1 week ago/  Involved joints: all over  Pain scale: 6.5/10   Wakes the patient from sleep : Yes  Morning stiffness: Yes for 120 minutes  Meds used:otezla,colchicine     Interim history  Since last visit:  1. Infections - Yes/ pneumonia  2. New symptoms/medical problem - Yes/ thinks she may have had a mini-seizure about 10 days ago ; did not seek medical attention; did not reoccur after that. Patient seeing PCP today.  3. Any side effects from Rheum  medications -vomiting a lot (resolved)  3. ER visits/Hospitalizations/surgeries - Yes  4. Last PCP visit: has an apt. today    Patient still getting double vision. Floaters present.     Therapist recommended psychiatry evaluation. Patient does not want to see psychaitry. Patient will see PCP today.     August 22, 2017  Have you ever seen a rheumatologist Yes Who You When 5/17/17    NO genital ulcers in the interim.   Mouth ulcers- three in the back of the throat and roof of the mouth last month.     Joint pain history  Onset: doing alittle worse / cut her otezla back to 30mg  Daily due to GI problems  Involved joints: all over her body. Been having more black out episodes, been having more chest pains.also has raised bumps on both legs from the knees down that itch and are painful   Pain scale:  5.5/10     Wakes the patient from sleep : Yes  Morning stiffness:Yes for 120 minutes  Meds used:otezla, colchicine (three pills daily)     Interim history  Since last visit:  1. Infections - Yes stomach issue  2. New symptoms/medical problem - No  3. Any side effects from Rheum medications -nausea from otezla  3. ER visits/Hospitalizations/surgeries - Yes, ER for foot, ankle injury from passing out and fell down the steps. Hit her head another time from passing out. Alcohol poisoning in July  4. Last PCP visit: 6/23/17 September 19, 2017  Have you ever seen a rheumatologist Yes Who You When 8/22/17  Joint pain history  Onset: pt states that she hurts everywhere for 6 days  Involved joints: all joints  Pain scale:  7/10     Wakes the patient from sleep : Yes/ not sleeping at all  Morning stiffness:Yes for 180 minutes  Meds used:colchicine, otezla, magic mouth wash, lidocaine gel  Last one week: increased joint pain; and genital ulcer  Genital lesion (dimed size) in the last one week  After botox a week ago, she had fever 101 for three days; near syncopes. Back pain; floaters  Chest pain , mouth sores, hand pain, morning  pain were all better with otezla 30mg twice daily.    Interim history  Since last visit:  1. Infections - No  2. New symptoms/medical problem - No  3. Any side effects from Rheum medications -nausea from the otezla  3. ER visits/Hospitalizations/surgeries - No  4. Last PCP visit: may 2017     October 18, 2017     Have you ever seen a rheumatologist Yes Who You When 9/19/17  Joint pain history  NO mouth or genital sores in the last 4 weeks.   Medrol dosepak helped with visual symptoms but not joint pains.     Onset: pt states that she is doing better than last time with her behcet's. Today has severe stomach pains, states that she is constipated. Pt had a seizure 2 days ago. Does have a metallic taste in her mouth  Involved joints: hands , neck, shoulders  Pain scale:  9/10 from stomach pain;      Wakes the patient from sleep : Yes  Morning stiffness:Yes for 120 minutes  Meds used:cochicine , otezla 30mg twice daily     Interim history  Since last visit:  1. Infections - No  2. New symptoms/medical problem - Yes/ the cartilage in her chest is inflamed  3. Any side effects from Rheum medications -nausea from the otezla  3. ER visits/Hospitalizations/surgeries - No  4. Last PCP visit: yesterday    January 16, 2018  Have you ever seen a rheumatologist Yes Who You When 10/18/17  Joint pain history  Onset: pt states that she was in the hospital for 5 days before maribell, they told her she had lesions in her brain in the white matter. Had blood work drawn in the ER and they told her her inflammatory markers were elevated. Was seen in the ER last week for vomiting and diarrhea, not eating. Saw Gastro. On 1/10/18. 1.5 weeks ago she had an endoscopy and colonoscopy. She has been having elbow  And hands and knee pain, loosing use of her hands. Been dropping things. Also states that she has been getting lesions up her nose  Involved joints: see above  Pain scale:  8/10     Wakes the patient from sleep : Yes  Morning  stiffness:Yes for 120 minutes  Meds used:colchisine, otezla, magic mouth wash, kenolog cream, lidocaine gel     Interim history  Since last visit:  1. Infections - Yes/ had an infection in her jaw. Having sinus issues  2. New symptoms/medical problem - Yes/ states that she is having problems walking, states that she was bouncing when she was walking  3. Any side effects from Rheum medications -otezla, nausea  3. ER visits/Hospitalizations/surgeries - Yes/ see chart  4. Last PCP visit: November 2017 April 17, 2018  Have you ever seen a rheumatologist Yes Who You When 1/16/18  Joint pain history  Onset: pt states that she is not doing well. She is having increased stomach issues, only eats 2 times in 4 days unable to keep the otezla down due to vomiting . Has sleep study done in 1 week; no genital ulcers since last appointment. 6 small mouth sores since last appointment.   Involved joints: see above   Pain scale:  7/10     Wakes the patient from sleep : Yes  Morning stiffness:Yes for 60 minutes  Meds used:otezla , colchicine, diclofenac gel, magic mouthwash, lidocaine gel      Interim history  Since last visit:  1. Infections - Yes/ had a sinus infection, thinks she is getting sick now  2. New symptoms/medical problem - Yes/ neurology sent pt to cardiology. Has a heart condition but not sure what . She is seeing a ortho. Due to knee pain   3. Any side effects from Rheum medications -nausea/vomiting from the otezla   3. ER visits/Hospitalizations/surgeries - Yes/ seen in ER   4. Last PCP visit: yesterday    July 17, 2018  Have you ever seen a rheumatologist Yes Who You When 4/17/18  Joint pain history  Onset: pt Is here for a follow-up states that she started to get lesions on her legs , face and arm. Hurts all over, lower back is very painful. Right hand and wrist area are numb  Involved joints: see above  Pain scale:  7/10   worse in the morning  Wakes the patient from sleep : Yes/ does not go to sleep till  late  Morning stiffness:Yes for 4-5 hours minutes  Meds used:colchicine, otezla has  Not started otezla yet due to extreme nausea      Interim history  Since last visit:  1. Infections - Yes/ had a bacterial infection in her pelvic area was hospitalized  2. New symptoms/medical problem - Yes/ hands are swelling up. Having orthopedic issues. Was having problem with her veins sticking up, and very painful. Has happened multiply times   3. Any side effects from Rheum medications -see above  3. ER visits/Hospitalizations/surgeries - Yes/ hospital and ER for bacterial infection  4. 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 ER said may have blood clot in calf, but when did MRI, stated body may have broken it up on it's own. Elevated D-dimer  Involved joints: Knees, neck, hands  Pain scale:  6.5/10     Wakes the patient from sleep : Yes  Morning stiffness:Yes for 180 minutes  Meds used:hyoscyamine, not taking otezla. Last dose Sep. Patient did not want to take otezla with the antibiotics.     Last major mouth ulcer- aug or Sep  No genital ulcers in the last 6 months.      Interim history  Since last visit:  1. Infections - Yes, UTI's twice a month since last visit  2. New symptoms/medical problem - No  3. Any side effects from Rheum medications -otzela causes nausea  3. ER visits/Hospitalizations/surgeries - Yes, 1/2/19 repaired some ligaments and mass taken out, also joint build up removed from a previous injury  4. Last PCP visit: 12/5/19 June 12, 2019  Have you ever seen a rheumatologist yes Who you When 1/9/19  Joint pain history  Onset: Patient is here for a follow up. A lot of infections and in and out of the hospital, who wanted her to follow up with Rheumatology again.  Involved joints: knees, legs, back, neck, shoulders, ankles  Pain scale:  6.5/10      Wakes the patient from sleep : Yes  Morning stiffness:Yes for 120 minutes  Meds used:magic mouthwash, colchicine     Interim history  Since last visit:  1. Infections - Yes, staph  2. New symptoms/medical problem - kidney failure  3. Any side effects from Rheum medications -none  3. ER visits/Hospitalizations/surgeries - Yes, 3 hospitalizations, and surgeries,  4. Last PCP visit: 6/11/19      Today 9/30/2020: New to me, establishing care. Flaring off otezla.    816   Reports worsening oral ulcers and joint pain and skin rash.    No vaginal ulcers currently. Last ones were 5 months ago.    Gets oral ulcers monthly, not today, had them last few days ago. They come up as flare up around period time. They self-resolve.    Thinks colcrys is helping but not enough, lost some benefit. No longer has diarrhea from it. the dose is 0.6 mg bid.    Came off otezla last year when she had severe staff infection, there was drug drug interaction with vancomycin. Wants to go back on it.    Skin lesions over chest are clearing up. Has skin lesions over her legs.    She is off of otezla >1 yr. She had daily vomiting and abdominal cramps initially which later resolved after 2 months.     Today, is her last day liz her health insurnaace  .    Reports pain and swelling liz hands. Drops things, lost strenghth. Veins look inflamed. Hands are swollen in AM and before bedtime. AM stiffness is 2 hours. can t tolerate ibuprofen.    Prednnisonne does not help much.    Wants to try eleve.    Had 2 blood clots over L arm, finished xraalto 4 months ago.     Was told to have severe dry eyes, hs not had eyes checked> 1 yr as was in the hospital with staff infection. systanee nd gentle eyedrops help some, sometimes gets sharp pain and and blurry vision in her eyes from dryness. No h/o uveitis.    has dry mouth, drinks water.    Gets T  F sometimes. It is sporadic.    Lost hair.    Gets intermittent CP. Had several hospital visits and  admissions in the pst for it. lorazepam helped witch chest spasms, she does not like pain meds.    She was prescribed 0.5 mgq d prn, 10 tbs/monthss.    She gets dry cough, just started using albuterol inhaler, it helps.    No SOB.    Has gastroparesis, IBS  nd h/o severas admission for constiption, colon blockage with impaction and gets bloating. has lost follow up with GI.    She is working with  to get medical assistance to get insurance back by early next year. She filed it again.    Gets botox inj every 3 months nd they help, has to cancel 10/20 botox inj s will not have insurance. they came back yesterday.    last seizure waas last year. still gets black out spells, salts ne was last week.      derm eye gi      FHx: Both parents have RA.    SHx: She was working in a [employer] shop, it was closed because of pandemic. sexually active, not on oral contraceptives. She thinks she had a misccraaaiaage last wk, had n period x2 months then mueller bleeding a lot, dark red and was different from period. Felt something came out that she feels she miscarried, no pos pregnancy test. No smoking. rarely drinks ETOH.    [facility]     thumbs between     MCP tender    otezla helped with skin lesions, oral ulcers, joint pain.    colcrys    sjogre aleve ariene voltaren [phone]      Today 8/11/2022: Samara presents for urgent visit to discuss m/o Behcet's flare during pregnancy, she is     She is pregnant 10 wk 3 days with ADRIAN 3/6/2023.    In 2020, had 1st trimester miscarriage.    Has LBP, ankle pain/swelling.    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 Discussed with MFM, back on low dose otezla since last week, only 1 tab a day.         Past Medical History:     Past Medical History:   Diagnosis Date     Anemia      Anxiety      Arthritis      Behcet's disease (H)      Cervical adenitis May 2010     Chronic abdominal pain       Constipation, chronic 1994     Fibromyalgia      Gastro-oesophageal reflux disease      Gastroparesis      Irregular heart beat     tachycardia, has had workup     Migraines      Neuromuscular disorder (H)     fibramyalgia     Palpitations      PONV (postoperative nausea and vomiting)      Seizure (H)      Seizures (H)     unknown etiology     Syncope      Tourette's      Past Surgical History:   Procedure Laterality Date     ARTHROSCOPY ANKLE, OPEN REPAIR LIGAMENT, COMBINED Left 9/25/2019    Procedure: LEFT ANKLE ARTHROSCOPY WITH LIGAMENT REPAIR;  Surgeon: Andres Johnson DPM;  Location: [facility]     ARTHROSCOPY ANKLE, REPAIR LIGAMENT Left 1/2/2019    Procedure: Ankle arthroscopy and sinus tarsi evacuation, ligament repair, left lower extremity;  Surgeon: Andres Johnson DPM;  Location: [facility] OR     ARTHROSCOPY KNEE WITH PATELLAR REALIGNMENT  7/25/2013    Procedure: ARTHROSCOPY KNEE WITH PATELLAR REALIGNMENT;  Left Knee Arthroscopy, Medial Patellofemoral Ligament Reconstruction with Allograft  ;  Surgeon: Jennifer Acevedo MD;  Location: [facility]     COLONOSCOPY  2015     DENTAL SURGERY  1996    Teeth removal     ENDOSCOPY UPPER, COLONOSCOPY, COMBINED  2005     HC ESOPH/GAS REFLUX TEST W NASAL IMPED >1 HR N/A 2/15/2017    Procedure: ESOPHAGEAL IMPEDENCE FUNCTION TEST WITH 24 HOUR PH GREATER THAN 1 HOUR;  Surgeon: Timothy Matta MD;  Location: [facility]     IR PICC PLACEMENT > 5 YRS OF AGE  3/13/2019     IR UPPER EXTREMITY VENOGRAM LEFT  2/25/2020     IRRIGATION AND DEBRIDEMENT FOOT, COMBINED Left 3/12/2019    Procedure: COMBINED IRRIGATION AND DEBRIDEMENT LEFT ANKLE;  Surgeon: Micha Glover MD;  Location: [facility]     IRRIGATION AND DEBRIDEMENT LOWER EXTREMITY, COMBINED Left 5/7/2019    Procedure: 1.  Excision of wound down to and including deep fascia, less than 20 cm2.  2.  Irrigation and debridement, left ankle.;  Surgeon: Andres Johnson DPM;  Location: [facility]     PICC INSERTION Left  05/05/2019    4Fr - 45cm (5cm external), Basilic vein, SVC RA junction          Social History:     Social History     Occupational History     Not on file   Tobacco Use     Smoking status: Never Smoker     Smokeless tobacco: Never Used   Substance and Sexual Activity     Alcohol use: Not Currently     Alcohol/week: 1.0 standard drink     Types: 1 Standard drinks or equivalent per week     Comment: rarely     Drug use: Not Currently     Types: Marijuana     Comment: occassional marijuana only, denies others     Sexual activity: Not Currently     Partners: Male     Birth control/protection: Pill          Family History:     Family History   Problem Relation Age of Onset     Depression Mother      Neurologic Disorder Mother         Migraines, take imitrex injection.  Also in maternal grandmother.       Alcohol/Drug Father      Hypertension Father      Depression Father      Osteoarthritis Father      Cardiovascular Maternal Grandmother      Depression Maternal Grandmother      Hypertension Maternal Grandmother      Alzheimer Disease Maternal Grandmother      Cardiovascular Maternal Grandfather      Hypertension Maternal Grandfather      Depression Maternal Grandfather      Alcohol/Drug Maternal Grandfather      Diabetes Maternal Grandfather      Cardiovascular Paternal Grandmother      Hypertension Paternal Grandmother      Cardiovascular Paternal Grandfather      Hypertension Paternal Grandfather      Glaucoma No family hx of      Macular Degeneration No family hx of           Allergies:     Allergies   Allergen Reactions     Amoxil [Penicillins] Rash     Dad unsure of reaction.     Bee Venom Anaphylaxis     Bioflavonoids Anaphylaxis     Citrus Anaphylaxis     All [place]     Contrast Dye Rash     [place] [facility] [place] [facility] [place]  NOTE: this is a contrast media oral with iodine. Premedicate with methylpred standard for IV contrast, request barium contrast for oral  contrast.     Diagnostic X-Ray Materials Hives and Rash     [place] [facility] [place] [facility] [place]  NOTE: this is a contrast media oral with iodine. Premedicate with methylpred standard for IV contrast, request barium contrast for oral contrast.     Pineapple Anaphylaxis, Difficulty breathing and Rash     Reglan [Metoclopramide] Other (See Comments)     IV dose only, in ER, rapid heart rate.     Ace Inhibitors      Difficulty in breathing and GI upset     Amitiza [Lubiprostone] Nausea and Vomiting     Amoxicillin-Pot Clavulanate      Midazolam Unknown     parent states that when pt takes this medication, she wakes up being very violent .     Other [No Clinical Screening - See Comments]      Bleech/ chest tightness, itchy throat, swollen tongue, hives     Tizanidine Other (See Comments)     Confusion, back pain, photophobia, abdominal pain, shaking, anxious       Versed      Coming out of pelvic exam at age of 5, was kicking and screaming when coming out of the versed.     Adhesive Tape Rash     Azithromycin Hives and Rash     Cephalexin Itching and Rash     Sulfa Drugs Rash     Skin scarring          Medications:     Current Outpatient Medications   Medication Sig Dispense Refill     albuterol (PROAIR HFA/PROVENTIL HFA/VENTOLIN HFA) 108 (90 Base) MCG/ACT inhaler Inhale 2 puffs into the lungs every 6 hours as needed for shortness of breath / dyspnea or wheezing 1 Inhaler 1     amitriptyline (ELAVIL) 25 MG tablet TAKE 1 TABLET BY MOUTH EVERY NIGHT AT BEDTIME 90 tablet 1     Apremilast (OTEZLA) 10 & 20 & 30 MG TBPK Take by mouth according to the instructions on the packet. Hold for signs of infection,any GI upset, weight loss or depression concerns, and seek medical attention. 1 each 0     artificial tears OINT ophthalmic ointment 0.5 inch strip each eye at night 1 Tube 11     benzocaine (TOPICALE XTRA) 20 % GEL Apply as needed locally to mouth or nasal ulcers for pain; 4  times daily as needed 30 g 1     betamethasone valerate (VALISONE) 0.1 % cream Apply topically 2 times daily as needed        bisacodyl (DULCOLAX) 5 MG EC tablet Take 2 tablets (10 mg) by mouth daily as needed for constipation 30 tablet 0     citalopram (CELEXA) 20 MG tablet Take 1 tablet (20 mg) by mouth daily 90 tablet 1     colchicine (COLCYRS) 0.6 MG tablet TAKE 1 TABLET (0.6 MG) BY MOUTH 2 TIMES DAILY 180 tablet 1     dexamethasone (DECADRON) 4 MG/ML injection To be used by therapist during PT sessions. 30 mL 0     dicyclomine (BENTYL) 20 MG tablet Take 1 tablet (20 mg) by mouth 4 times daily as needed 120 tablet 3     diphenhydrAMINE (BENADRYL) 25 MG tablet Take 1 tablet (25 mg) by mouth 3 times daily as needed (itching) 40 tablet 1     EPINEPHrine (EPIPEN 2-EAMON) 0.3 MG/0.3ML injection Inject 0.3 mLs (0.3 mg) into the muscle as needed for anaphylaxis 0.6 mL 3     gabapentin (NEURONTIN) 300 MG capsule TAKE ONE CAPSULE BY MOUTH THREE TIMES DAILY  90 capsule 0     hydrOXYzine (ATARAX) 25 MG tablet TAKE ONE OR TWO TABLETS BY MOUTH EVERY SIX HOURS AS NEEDED       hypromellose (GENTEAL) 0.3 % SOLN 1 drop every hour as needed for dry eyes        lidocaine (LMX4) 4 % external cream Apply topically once as needed for mild pain 120 g 1     lidocaine (LMX4) 4 % external cream Apply 1 Application topically once as needed for mild pain       LINZESS 290 MCG capsule TAKE 1 CAPSULE BY MOUTH EVERY DAY IN THE MORNING BEFORE BREAKFAST 90 capsule 0     LORazepam (ATIVAN) 0.5 MG tablet TAKE 1 TABLET BY MOUTH EVERY 6 HOURS AS NEEDED FOR ANXIETY 10 tablet 0     ondansetron (ZOFRAN-ODT) 8 MG ODT tab Take 1 tablet (8 mg) by mouth every 8 hours as needed for nausea 180 tablet 1     OTEZLA 30 MG tablet TAKE ONE TABLET BY MOUTH TWICE A DAY. HOLD FOR SIGNS OF INFECTION, AND SEEK MEDICAL ATTENTION. 180 tablet 0     polyethylene glycol (MIRALAX/GLYCOLAX) powder Take 1 capful by mouth 3 times daily       rizatriptan (MAXALT) 5 MG tablet  Take 1 tablet (5 mg) by mouth at onset of headache for migraine May repeat in 2 hours. Max 6 tablets/24 hours. 10 tablet 1     sodium fluoride 1.1 % CREA Apply 1 Application topically daily       sucralfate (CARAFATE) 1 GM/10ML suspension Take 10 mLs (1 g) by mouth 4 times daily 1200 mL 2     Apixaban Starter Pack (ELIQUIS DVT/PE STARTER PACK) 5 MG TBPK Take by mouth as directed on starter pack. (Patient not taking: Reported on 8/11/2022)       cyproheptadine (PERIACTIN) 4 MG tablet TAKE 2 TABLETS (8 MG) BY MOUTH AT BEDTIME FOR NIGHTMARES (Patient not taking: No sig reported) 180 tablet 1     diclofenac (VOLTAREN) 75 MG EC tablet Take 1 tablet (75 mg) by mouth 2 times daily as needed for moderate pain (Patient not taking: No sig reported) 60 tablet 1     furosemide (LASIX) 20 MG tablet Take 1 tablet (20 mg) by mouth 3 times daily (Patient not taking: Reported on 8/11/2022) 60 tablet 3     furosemide (LASIX) 40 MG tablet TAKE ONE TABLET BY MOUTH TWICE DAILY  (Patient not taking: No sig reported) 60 tablet 0     guanFACINE (TENEX) 2 MG tablet Take one and one half tabs in the morning and one and one half in the evening. (Patient not taking: No sig reported) 270 tablet 3     lactulose 20 GM/30ML SOLN Take 30 mLs by mouth 3 times daily as needed (for constipation) (Patient not taking: No sig reported) 300 mL 3     order for DME Equipment being ordered: Trilok brace 8 1/2 left lower extremity (Patient not taking: No sig reported) 1 Device 0     order for DME Equipment being ordered: size 8 1/2 walking boot tall (Patient not taking: No sig reported) 1 Device 0     predniSONE (DELTASONE) 10 MG tablet Take 1 tablet (10 mg) by mouth daily (Patient not taking: Reported on 8/11/2022) 30 tablet 1     predniSONE (DELTASONE) 20 MG tablet Take two tablets (= 40mg) each day for 4 (four) days (Patient not taking: No sig reported) 8 tablet 0     triamcinolone (KENALOG) 0.5 % external ointment Apply 1 g topically 3 times daily as  needed for irritation (Patient not taking: No sig reported) 15 g 3          Physical Exam:   Constitutional: NAD, pleasant  Eyes: EOMI, nl conj, sclera  MS: no synovitis  Skin: no rash in exposed areas  Neuro: Non focal  Psych: nl affect         Data:     CBC RESULTS:   Recent Labs   Lab Test  06/06/17 2048   WBC  4.7   RBC  4.09   HGB  12.0   HCT  35.9   MCV  88   MCH  29.3   MCHC  33.4   RDW  13.1   PLT  189       Liver Function Studies -   Recent Labs   Lab Test  06/06/17 2048   PROTTOTAL  6.7*   ALBUMIN  3.8   BILITOTAL  0.3   ALKPHOS  91   AST  26   ALT  44       Creatinine   Date Value Ref Range Status   09/07/2020 0.79 0.52 - 1.04 mg/dL Final   ]    No results found for: URIC]    HLA-B27  No results for input(s): [id number], B1 in the last 40694 hours.  RF/CCP  Recent Labs   Lab Test  05/06/16   1554   CCPIGG  1   RHF  <20     CYNDIE/RNP/Sm/SSA/SSB  Recent Labs   Lab Test  11/01/16   1318   TREPAB  Negative     ESR/CRP  Recent Labs   Lab Test  04/21/17   1249  04/14/17   1351  11/06/16   1341  03/11/16   1350  11/18/15   1453   SED   --    --   4  5  6   CRP  <2.9  <2.9  <2.9  <2.9  <2.9

## 2022-08-11 NOTE — LETTER
8/11/2022       RE: Samara Oropeza  [address]  [address]  [facility] [place] [address]     Dear Colleague,    Thank you for referring your patient, Samara Oropeza, to the [facility] RHEUMATOLOGY [facility]IN[facility] [place] [facility]. Please see a copy of my visit note below.    Samara Oropeza  is being evaluated via a billable video visit.      How would you like to obtain your AVS? [employer]  For the video visit, send the invitation by: Text to cell phone: [phone]   Will anyone else be joining your video visit? 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 Lilliana Miramontes- complicated migraine  # On Sprintec for birth control   # Borderline transaminase elevation    12/18 Basic blood cell counts, liver and kidney function labs within normal limits    Meets ISG 1990 criteria for Behcets. Initially, very good response to colchicine which has reduced the intensity and frequency of the oral ulcers in the past. Patient relapsed with genital ulcers and few oral ulcers in the end of 2016 and also with folliculitis in skin. Seen  April Elliott. He recommended otezla. Otezla is working for her and she takes twice daily 30mg PO daily. Chest pain , mouth sores, hand pain, morning pain are all better when she tried otezla 30mg twice daily. Currently off of otezla since about 8/18 because of frequent UTI per patient. Colchicine dose was reduced recently in hospital to 0.3mg daily per patient. Patient reports this was regarding antibiotic interaction. As patient's behcet's is flaring up I recommend increasing colchicine back to 0.6mg BID. No interaction with levaquin. If genital lesions do not improve, then see gynecology.     Neurology investigated for seizures and cause. Hospitalized 12/17. 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     Has tried prednisone in the past, but does not tolerate well due to mood issues, and has been off prednisone for the past 6+ months. Has H/o Tourettes. Followed by Dr. Miramontes.     She continues to have GI symptoms  Colonoscopy and endoscopy negative 2018.  Has gastroparesis.  Behcets may have GI involvement but no evidence of GI inflammation at this time. Dr. Baker has evaluated her. Dr. Rollins did the endoscopies. Her abdominal pain is not related to otezla/colchicine as her abdominal symptoms were present even before they were started. This was verified with the patient.  Patient seeing [facility] currently. [facility] suggested 6 week therapy in [place] for pelvic floor muscles. Patient is not able to afford that and thinking of alternative options.     She gets visual symptoms  But there is no evidence of uveitis including posterior uveitis or retinal vasculitis, denies symptoms at today's visit. She has seen Dr. Garcia in the past and also seen Dr. Heaton around 2/16 and 9/17. Patient still getting double vision and floaters but 9/17 eye exam was stable.     She also likely has fibromyalgia. Currently managing her behcets.     For chest symptoms, she was referred to pulmonology by GI. CT  chest negative for any lung issues 1/18    - Continue oral gel/Triamcinolone acetonide cream (0.1 percent in [employer]) three to four times daily during attacks of oral ulcers and be used until pain from the ulcer ceases. Potent topical corticosteroids may be used for genital ulcers. Also use magic mouthwash as needed.      Today 8/11/2022:    She is pregnant 10 wk 3 days with ADRIAN 3/6/2023. Off colcrys. Stopped otezla with positive pregnancy test but resumed last week after Behcet flare after discussion with MFM, but at 1 tab every day.     I informed her that per ACR reproductive guideline, there is not enough data about safely use of otezla during pregnancy. After discussing risks/benefits given uncontrolled flare off otezla, Samara decided to stay on otezla during pregnancy to manage flare. I advised her to take otezla after 12 weeks of pregnancy to minimize risk to the fetus, she is ok to wait.        Plan:    Otezla twice a day after 12 weeks of pregnancy    Kenalog paste for mouth ulcers    Keep Oct appointment with me    Keep rheum/derm appointment    Follow up with MFM    Return visit (virtual) in 3 months      Dominga Sosa MD            Active Problem List:     Patient Active Problem List    Diagnosis Date Noted     Infection of joint of ankle (H) 05/06/2019     Priority: Medium     Cellulitis 03/09/2019     Priority: Medium     Displacement of lumbar intervertebral disc without myelopathy 11/13/2018     Priority: Medium     Overview:   Created by Conversion       Iron deficiency associated with nonfamilial restless legs syndrome 11/13/2018     Priority: Medium     Enthesopathy of hip region 11/13/2018     Priority: Medium     Overview:   Created by Conversion       Tourette's syndrome 11/13/2018     Priority: Medium     Overview:   Created by Conversion       Somatic symptom disorder 06/01/2018     Priority: Medium     Pelvic floor weakness 04/25/2018     Priority: Medium     Spells of decreased  attentiveness 12/19/2017     Priority: Medium     Mobile cecum 11/09/2017     Priority: Medium     Cecum noted in Right lower quadrant on 4/17 CT scan, and in Left upper Quadrant on CT on 11/2017.       Vitamin D deficiency 10/11/2017     Priority: Medium     How low, unknown/not found. On D when tested at 28. Starting cholecalciferol October 2017. Needs recheck.       Convulsions, unspecified convulsion type (H) 10/03/2017     Priority: Medium     Transient alteration of awareness 10/03/2017     Priority: Medium     Chronic pain syndrome 07/27/2017     Priority: Medium     Major depressive disorder, recurrent episode, moderate (H) 06/27/2017     Priority: Medium     Cervical pain 05/02/2017     Priority: Medium     Acute left ankle pain 03/31/2017     Priority: Medium     Cervical dystonia 03/28/2017     Priority: Medium     PTSD (post-traumatic stress disorder) 01/17/2017     Priority: Medium     Patellofemoral instability 10/20/2016     Priority: Medium     Fibromyalgia 08/04/2016     Priority: Medium     Rheumatoid arthritis of multiple sites without rheumatoid factor (H) 08/04/2016     Priority: Medium     Raynaud's disease without gangrene 08/04/2016     Priority: Medium     Chronic abdominal pain 08/04/2016     Priority: Medium     Palpitations 01/12/2016     Priority: Medium     On colchicine therapy 10/30/2015     Priority: Medium     Spell of shaking 05/06/2015     Priority: Medium     Migraine 02/04/2015     Priority: Medium     Behcet's disease (H) 12/10/2014     Priority: Medium     Headaches due to old head injury 11/12/2013     Priority: Medium     Milk protein intolerance 10/11/2013     Priority: Medium     Intestinal malabsorption 10/11/2013     Priority: Medium     Concussion 02/13/2013     Priority: Medium     Jan 2013, with prolonged recovery- followed by sports med         Knee pain 01/03/2013     Priority: Medium     Generalized anxiety disorder 06/25/2009     Priority: Medium     Tics -  Tourette syndrome 05/18/2009     Priority: Medium     Followed by psychotherapy. Symptoms well managed. Originally diagnosed at [facility]. (Dr. Simpson)           IBS (irritable bowel syndrome) 05/18/2009     Priority: Medium     Allergic rhinitis 05/18/2009     Priority: Medium     GERD (gastroesophageal reflux disease) 01/10/2008     Priority: Medium     Gastroparesis 1995     Priority: Medium          History of Present Illness:     No chief complaint on file.    [place] [facility] [place]    Original presentation 2014:    Ms Oropeza is a 19 y.o. female who presents with one year of presentation of painful oral ulcers (monthly) once that have worsened with two episodes in the last two months that presented with painful vulvar or vaginal ulcers (2 episodes so far every month) [not sure if they leave scar] as well that timing coincides with [place] [place]   ORAL ULCERS: last two episodes were severe, painful, white base with erythematous halo, that appear and disappear in the matter of 1-2 weeks without, does not bleed and doesn't respond to any treatment used (magic mouthwash), 10-15 in number with the biggest one being dime size.     VAGINAL ULCERS: 2-3 in number between labia majora and minora that occur simultaneously with oral ulcers, painful, non bleeding ulcers that are erythematous, no pus.     FOLLICULITIS: patient reports having spots in legs specially around ankle and knee, but arms, and neck are affected as well. Small lesions that resemble ingrown hair, most are red erythematous elevated lesions, well demarcated, some with liquid that patient refers as pimple like.     SKIN RASHES: welts and red macules with well defined borders that are pruritic and non-ulcerative on chest, arms and back. Benadryl relieves.     ARTHRALGIAS: In descending order of severity: hips, knees, wrists, shoulders, neck, lumbar, fingers.   C/o morning stiffness in hips, back and neck  lasting for about until noon.     Ms. Oropeza reports they present in severe flares and never fully resolve, but do get better. She has seen some swelling and warmth on the affected joints but has not noticed and redness. Has tried Tylenol, ibuprofen, and hydrocodone with not much benefit.     FEVERS: Reports 3-4 sporadic episodes per month of self limited fevers of 38 C that occur during the evenings and associate facial flushing.     HEADACHES: occur daily and are bitemporal and irradiate occipitally, pulsatile in nature, intensity varies from intense to mild, are worsened with movement. On treatment with ibuprofen and somatriptan without any positive results.     VISION CHANGES: Patient reports that suddenly she would only see a gray blotch in her right eyes and would persist like this for a day and a half. Also reports blurriness in her left eye. Presence of pain and burning sensation of eyeball with associated redness. Has changed prescription glasses in the matter of 2 years 3 times. She has had opthalmology exam and was not suggestive of any evidence of uveitis.   She was also evaluated in ED for a dizzy spell.     ABD PAIN W/ INTERMITTENT DIARRHEA AND CONSTIPATION: Patient reports abdominal pain that is intermittent, periods of 7 days without bowel movement and feeling bloated with change of pattern to diarrhea (liquidy without blood nor mucus, non foul smelling). Has taken Bentyl, Zegrid, and rinetidine with mild clinical improvement.  She also reports small red nodules after each needle stick for blood draw.   Neurology saw her back then and was not impressed with her presentation as physical examination was normal. Also MRI brain normal: Findings: No definite hemorrhage, mass affect, midline shift, or ventriculomegaly is noted.There are a few scattered non specific white matter T2 hyperintensities. Axial diffusion weighted images are unremarkable.     The major vascular structures appear patent. There is  opacification and severe mucosal thickening in the right maxillary sinus. The remaining paranasal sinuses, and mastoid air cells are clear. Orbits are unremarkable  She has + HSV-1 IGG. Seen ID. Negative for Herpes simplex virus culture. HSV IGM I/II COMBINATION SENT TO [facility]  RESULTS = <0.91  REF RANGE: <0.91 = NEGATIVE  ID did not think HSV could explain her mouth and genital sores.     CRP within normal limits. HIV negative. CBC within normal limits; UA negative. Creatinine within normal limits   CYNDIE neg.  Antigliadin neg.   ANti TTG neg.   [facility] 2014: Colonoscopy and endoscopy neg; result not available. Not sure if biopsies were done.   Raynaud's phenomenon:  Onset: about 2013  Digits: all fingers  Digital ulcers: no  Color changes: white ---> purple and red  Duration of an attack: About couple of hours per mom  Pain during attack: not clear pain but bruise like feeling  Frequency of attacks: few times a month  Family history of Raynauds: no  GERD: very long time    No hemoptysis.   No miscarriages/ no thrombosis in past.   No family or personal history of psoriasis, ulcerative colitis or chron's disease.   No buttock pain or low back pain or stiffness in AM    Interim history:  Dec 2014- Multiple mouth ulcers and did not eat for 5 days. This coincided with periods. Colchicine 0.6mg PO BID started in Nov 2014.   Prednisone taper in Dec 20mg to 0 in 4 weeks. She also used magic mouthwash. Kenalog cream. After going to the ER, 3 days later her ulcers were better. She thinks it improved after the menstruation stopped.   LMP 3 weeks ago.   COLCHICINE HAS HELPED A LOT REDUCING THE INTENSITY OF MENSTRUATION ASSOCIATED ORAL AND VULVAR ULCERS.     Interim history: 6/15    Since last visit only two episodes of mouth sores- small sized 6   No genital ulcers since last visit.   Colchicine 1.2 mg in AM and 0.6mg in PM keeps her symptoms under control.     Admitted in 5/15:  Admission Diagnoses:  - LUE weakness  - spell  -  "hx of migraines  - hx of Tourette syndrome  - hx of Behcet's disease    Discharge Diagnoses:   - spell likely 2/2 anxiety  - hx of migraines  - hx of Tourette syndrome  - hx of Behcet's disease    CT and MRI brain was normal.     Seeing Dr. Lilliana Miramontes soon as outpatient.     Dr. Garcia did not find any intraocular inflammation.      Interm 10/30/2015  ED for migraine. Continues to see neuro - MRI brain without lesions noted. Saw GI - upper barium normal. May be considering repeat colo. Worsening lesions in mouth and genitals. Has had continuous lesion in mouth x 2 months. 2 genital ulcers. Had fracture of right sesamoid in foot. Continues to have low grade fevers. New folliculitis on chest, legs and arms.     Interim hx: 1/11/2016    Pt states that since last visit she continues to have oral ulcers and has noted more folliculitis-like lesions on her lower extremities.  She states that on her right lower leg she had a red macular spot that has since resolved.  She denies uveitis.  She states that the colchicine initially helped but then stopped working.  She takes 0.6 mg TID.  She stopped taking her prednisone last week as she feels like this hasn't helped.  She hasn't had any episodes of vaginal ulcers.      She saw GI who stated she has gastroparesis.  She is seeing Cardiology later this week for possible syncopal episodes.      Interim history 5/16  Mouth ulcers X 1 last month  Genital ulcers - none since last visit.     Bilateral MCPs pain, knee pain and low back pain +ve increased since last visit  Morning stiffness X 2 hours (increasing)   5-6/10    \"overall myalgia\" has gotten worse    C/o pseudofolliculitis lesion on anterior shins bilaterally worse    Interim history: (7/18/2016)  Patient has just started Humira for 2 doses on 7/1 and 7/15 and reported minimal improvement of her hip pain but otherwise, did not notice anything different.     She reported painful mouth ulcer once a month since last " visit but noticed that it has been getting deeper.   Denied any genital ulcers.    Still has ongoing bilateral MCPs pain, knee pain but this has been intermittent and she did not have any much pain today. She reported 2-3 hours morning stiffness of both hands and pain score of 6/10 at her knees and 8/10 of her hands but currently takes no pain medication except prn ativan which she takes when her muscle is really tight.     The only concern is that she gained weight even though she has not been eating much (eat once a day) and do exercises every day for 1 hour (with video of yoga, pilates). Her mother wonders if she needs to have some labs work up include sex hormone, thyroid, cortisol.    Interval history 9/14/16  Patient was started Humira at the last visit, patient reports worsening of skin ulcers since starting Humira and stopped taking Humura for the past 2 weeks. Patient reports oral and genital ulcers has been stable even before starting Humira and has not had any interval oral/genital ulcers. However she reports recurrent skin ulcers occurring on a daily basis that affects her chest and anterior legs. Patient also has stopped taking prednisone, she reports that she doesn't feel the prednisone helps, her last dose of prednisone was 6+ months ago. Patient also report worsening low back pain that sometimes causes her to limp.    In the interval patient also visited ED on 8/31/16 for left hand pain and what the patient describes as phlebitis, no medication or procedure was done and the patient was discharged with a splint. Patient also reported 1 episode of chest pressure that was associated with dyspnea and numbness of the left arm, she was shopping in a supermarket at the time of onset, and the pressure resolved after she took a nap.    Patient denies any infectious symptoms in the interval, no fever, chills, night sweat, cough, dysuria, denies eye pain or visual changes.    November 4, 2016  Oct - had one  genital ulcer  Oral ulcers X 5- around menstruation time.   Knee pain- chronic on the left side  Dizziness spells - on and off; been to the ER for that in the past.   On and off migraines  July 2013 - s/p MPFL reconstruction plus LRL 7/2013  Morning stiffness in knee (left) X 1 hour    Severe jaw pain and chest pain- patient wondering if this is Tourette's or esophageal spasms. Cardiac workup negative.   Fever     January 13, 2017  Yesterday in [place] [facility] [place] with orthostatic syncope from dehydration.   Chest pain on and off still continues. Painful with deep breaths.   Oral ulcers - few minor ones lasting for one week;   One major one in roof of mouth lasting for about one week.   Knee pain +ve - reviewed the recent MRI with her orthopedic surgeon.   On and off migraines  otezla is approved. Still waiting to receive the meds.     March 31, 2017  Otezla current dose 15mg PO BID.   She is tolerating okay at this dose and is willing to increase the dose further up to 30mg BID.   Her joint pains are better on otezla. Knee pain is also better.   Back and neck pain +ve 8/10 when it is worse. Intramuscular botox injections were given on Monday in PMR.   2 minor genital ulcers in the interim- resolved.   Few oral ulcers in the interim- resolved.   Nasal ulcer - resolved.   Migraines on and off.   Nausea with otezla but improving.   Dizziness and blackouts on and off. She fell once and hurt her ankle. Wearing boot in left leg.   Complete ROS negative except for above    May 17, 2017  Tolerating otezla better. Not throwing up anymore. Current dose 20mg in AM and 30mg in PM (2nd week).   Patient thinks that her oral ulcers frequency and severity are improving with otezla. Also no genital ulcers in the interim.   Currently on doxycycline for bronchitis/?pneunomia. 4 more days left.     Joint pain history  2 weeks ago/ dx with pneumonia 1 week ago/  Involved joints: all over  Pain scale: 6.5/10   Wakes the patient from sleep :  Yes  Morning stiffness: Yes for 120 minutes  Meds used:otezla,colchicine     Interim history  Since last visit:  1. Infections - Yes/ pneumonia  2. New symptoms/medical problem - Yes/ thinks she may have had a mini-seizure about 10 days ago ; did not seek medical attention; did not reoccur after that. Patient seeing PCP today.  3. Any side effects from Rheum medications -vomiting a lot (resolved)  3. ER visits/Hospitalizations/surgeries - Yes  4. Last PCP visit: has an apt. today    Patient still getting double vision. Floaters present.     Therapist recommended psychiatry evaluation. Patient does not want to see psychaitry. Patient will see PCP today.     August 22, 2017  Have you ever seen a rheumatologist Yes Who You When 5/17/17    NO genital ulcers in the interim.   Mouth ulcers- three in the back of the throat and roof of the mouth last month.     Joint pain history  Onset: doing alittle worse / cut her otezla back to 30mg  Daily due to GI problems  Involved joints: all over her body. Been having more black out episodes, been having more chest pains.also has raised bumps on both legs from the knees down that itch and are painful   Pain scale:  5.5/10     Wakes the patient from sleep : Yes  Morning stiffness:Yes for 120 minutes  Meds used:otezla, colchicine (three pills daily)     Interim history  Since last visit:  1. Infections - Yes stomach issue  2. New symptoms/medical problem - No  3. Any side effects from Rheum medications -nausea from otezla  3. ER visits/Hospitalizations/surgeries - Yes, ER for foot, ankle injury from passing out and fell down the steps. Hit her head another time from passing out. Alcohol poisoning in July 4. Last PCP visit: 6/23/17 September 19, 2017  Have you ever seen a rheumatologist Yes Who You When 8/22/17  Joint pain history  Onset: pt states that she hurts everywhere for 6 days  Involved joints: all joints  Pain scale:  7/10     Wakes the patient from sleep : Yes/ not  sleeping at all  Morning stiffness:Yes for 180 minutes  Meds used:colchicine, otezla, magic mouth wash, lidocaine gel  Last one week: increased joint pain; and genital ulcer  Genital lesion (dimed size) in the last one week  After botox a week ago, she had fever 101 for three days; near syncopes. Back pain; floaters  Chest pain , mouth sores, hand pain, morning pain were all better with otezla 30mg twice daily.    Interim history  Since last visit:  1. Infections - No  2. New symptoms/medical problem - No  3. Any side effects from Rheum medications -nausea from the otezla  3. ER visits/Hospitalizations/surgeries - No  4. Last PCP visit: may 2017     October 18, 2017     Have you ever seen a rheumatologist Yes Who You When 9/19/17  Joint pain history  NO mouth or genital sores in the last 4 weeks.   Medrol dosepak helped with visual symptoms but not joint pains.     Onset: pt states that she is doing better than last time with her behcet's. Today has severe stomach pains, states that she is constipated. Pt had a seizure 2 days ago. Does have a metallic taste in her mouth  Involved joints: hands , neck, shoulders  Pain scale:  9/10 from stomach pain;      Wakes the patient from sleep : Yes  Morning stiffness:Yes for 120 minutes  Meds used:cochicine , otezla 30mg twice daily     Interim history  Since last visit:  1. Infections - No  2. New symptoms/medical problem - Yes/ the cartilage in her chest is inflamed  3. Any side effects from Rheum medications -nausea from the otezla  3. ER visits/Hospitalizations/surgeries - No  4. Last PCP visit: yesterday    January 16, 2018  Have you ever seen a rheumatologist Yes Who You When 10/18/17  Joint pain history  Onset: pt states that she was in the hospital for 5 days before maribell, they told her she had lesions in her brain in the white matter. Had blood work drawn in the ER and they told her her inflammatory markers were elevated. Was seen in the ER last week for vomiting  and diarrhea, not eating. Saw Gastro. On 1/10/18. 1.5 weeks ago she had an endoscopy and colonoscopy. She has been having elbow  And hands and knee pain, loosing use of her hands. Been dropping things. Also states that she has been getting lesions up her nose  Involved joints: see above  Pain scale:  8/10     Wakes the patient from sleep : Yes  Morning stiffness:Yes for 120 minutes  Meds used:colchisine, otezla, magic mouth wash, kenolog cream, lidocaine gel     Interim history  Since last visit:  1. Infections - Yes/ had an infection in her jaw. Having sinus issues  2. New symptoms/medical problem - Yes/ states that she is having problems walking, states that she was bouncing when she was walking  3. Any side effects from Rheum medications -otezla, nausea  3. ER visits/Hospitalizations/surgeries - Yes/ see chart  4. Last PCP visit: November 2017 April 17, 2018  Have you ever seen a rheumatologist Yes Who You When 1/16/18  Joint pain history  Onset: pt states that she is not doing well. She is having increased stomach issues, only eats 2 times in 4 days unable to keep the otezla down due to vomiting . Has sleep study done in 1 week; no genital ulcers since last appointment. 6 small mouth sores since last appointment.   Involved joints: see above   Pain scale:  7/10     Wakes the patient from sleep : Yes  Morning stiffness:Yes for 60 minutes  Meds used:otezla , colchicine, diclofenac gel, magic mouthwash, lidocaine gel      Interim history  Since last visit:  1. Infections - Yes/ had a sinus infection, thinks she is getting sick now  2. New symptoms/medical problem - Yes/ neurology sent pt to cardiology. Has a heart condition but not sure what . She is seeing a ortho. Due to knee pain   3. Any side effects from Rheum medications -nausea/vomiting from the otezla   3. ER visits/Hospitalizations/surgeries - Yes/ seen in ER   4. Last PCP visit: yesterday    July 17, 2018  Have you ever seen a rheumatologist Yes Who  You When 4/17/18  Joint pain history  Onset: pt Is here for a follow-up states that she started to get lesions on her legs , face and arm. Hurts all over, lower back is very painful. Right hand and wrist area are numb  Involved joints: see above  Pain scale:  7/10   worse in the morning  Wakes the patient from sleep : Yes/ does not go to sleep till late  Morning stiffness:Yes for 4-5 hours minutes  Meds used:colchicine, otezla has  Not started otezla yet due to extreme nausea      Interim history  Since last visit:  1. Infections - Yes/ had a bacterial infection in her pelvic area was hospitalized  2. New symptoms/medical problem - Yes/ hands are swelling up. Having orthopedic issues. Was having problem with her veins sticking up, and very painful. Has happened multiply times   3. Any side effects from Rheum medications -see above  3. ER visits/Hospitalizations/surgeries - Yes/ hospital and ER for bacterial infection  4. 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 ER said may have blood clot in calf, but when did MRI, stated body may have broken it up on it's own. Elevated D-dimer  Involved joints: Knees, neck, hands  Pain scale:  6.5/10     Wakes the patient from sleep : Yes  Morning stiffness:Yes for 180 minutes  Meds used:hyoscyamine, not taking otezla. Last dose Sep. Patient did not want to take otezla with the antibiotics.     Last major mouth ulcer- aug or Sep  No genital ulcers in the last 6 months.      Interim history  Since last visit:  1. Infections - Yes, UTI's twice a month since last visit  2. New symptoms/medical problem - No  3. Any side effects from Rheum medications -otzela causes nausea  3. ER visits/Hospitalizations/surgeries - Yes, 1/2/19 repaired some ligaments and mass taken out, also joint build up removed from a previous injury  4.  Last PCP visit: 12/5/19 June 12, 2019  Have you ever seen a rheumatologist yes Who you When 1/9/19  Joint pain history  Onset: Patient is here for a follow up. A lot of infections and in and out of the hospital, who wanted her to follow up with Rheumatology again.  Involved joints: knees, legs, back, neck, shoulders, ankles  Pain scale:  6.5/10     Wakes the patient from sleep : Yes  Morning stiffness:Yes for 120 minutes  Meds used:magic mouthwash, colchicine     Interim history  Since last visit:  1. Infections - Yes, staph  2. New symptoms/medical problem - kidney failure  3. Any side effects from Rheum medications -none  3. ER visits/Hospitalizations/surgeries - Yes, 3 hospitalizations, and surgeries,  4. Last PCP visit: 6/11/19      Today 9/30/2020: New to me, establishing care. Flaring off otezla.    816   Reports worsening oral ulcers and joint pain and skin rash.    No vaginal ulcers currently. Last ones were 5 months ago.    Gets oral ulcers monthly, not today, had them last few days ago. They come up as flare up around period time. They self-resolve.    Thinks colcrys is helping but not enough, lost some benefit. No longer has diarrhea from it. the dose is 0.6 mg bid.    Came off otezla last year when she had severe staff infection, there was drug drug interaction with vancomycin. Wants to go back on it.    Skin lesions over chest are clearing up. Has skin lesions over her legs.    She is off of otezla >1 yr. She had daily vomiting and abdominal cramps initially which later resolved after 2 months.     Today, is her last day liz her health insurnaace  .    Reports pain and swelling liz hands. Drops things, lost strenghth. Veins look inflamed. Hands are swollen in AM and before bedtime. AM stiffness is 2 hours. can t tolerate ibuprofen.    Prednnisonne does not help much.    Wants to try eleve.    Had 2 blood clots over L arm, finished xraalto 4 months ago.     Was told to have severe dry eyes, hs not had  eyes checked> 1 yr as was in the hospital with staff infection. systanee nd gentle eyedrops help some, sometimes gets sharp pain and and blurry vision in her eyes from dryness. No h/o uveitis.    has dry mouth, drinks water.    Gets T  F sometimes. It is sporadic.    Lost hair.    Gets intermittent CP. Had several hospital visits and admissions in the [facility] for it. lorazepam helped witch chest spasms, she does not like pain meds.    She was prescribed 0.5 mgq d prn, 10 tbs/monthss.    She gets dry cough, just started using albuterol inhaler, it helps.    No SOB.    Has gastroparesis, IBS  nd h/o severas admission for constiption, colon blockage with impaction and gets bloating. has lost follow up with GI.    She is working with  to get medical assistance to get insurance back by early next year. She filed it again.    Gets botox inj every 3 months nd they help, has to cancel 10/20 botox inj s will not have insurance. they came back yesterday.    last seizure waas last year. still gets black out spells, salts ne was last week.      derm eye gi      FHx: Both parents have RA.    SHx: She was working in a [employer] shop, it was closed because of pandemic. sexually active, not on oral contraceptives. She thinks she had a misccraaaiaage last wk, had n period x2 months then mueller bleeding a lot, dark red and was different from period. Felt something came out that she feels she miscarried, no pos pregnancy test. No smoking. rarely drinks ETOH.    [facility]     thumbs between     MCP tender    otezla helped with skin lesions, oral ulcers, joint pain.    colcrys    sjogre aleve biotene voltaren [id number]      Today 8/11/2022: Samara presents for urgent visit to discuss m/o Behcet's flare during pregnancy, she is     She is pregnant 10 wk 3 days with ADRIAN 3/6/2023.    In 2020, had 1st trimester miscarriage.    Has LBP, ankle pain/swelling.    When she found out to be pregnant, stopped  otezla and colcrys but started to flare with Behcet's. Discussed with MFM, back on low dose otezla since last week, only 1 tab a day.         Past Medical History:     Past Medical History:   Diagnosis Date     Anemia      Anxiety      Arthritis      Behcet's disease (H)      Cervical adenitis May 2010     Chronic abdominal pain      Constipation, chronic 1994     Fibromyalgia      Gastro-oesophageal reflux disease      Gastroparesis      Irregular heart beat     tachycardia, has had workup     Migraines      Neuromuscular disorder (H)     fibramyalgia     Palpitations      PONV (postoperative nausea and vomiting)      Seizure (H)      Seizures (H)     unknown etiology     Syncope      Tourette's      Past Surgical History:   Procedure Laterality Date     ARTHROSCOPY ANKLE, OPEN REPAIR LIGAMENT, COMBINED Left 9/25/2019    Procedure: LEFT ANKLE ARTHROSCOPY WITH LIGAMENT REPAIR;  Surgeon: Andres Johnson DPM;  Location:  OR     ARTHROSCOPY ANKLE, REPAIR LIGAMENT Left 1/2/2019    Procedure: Ankle arthroscopy and sinus tarsi evacuation, ligament repair, left lower extremity;  Surgeon: Andres Johnson DPM;  Location:  OR     ARTHROSCOPY KNEE WITH PATELLAR REALIGNMENT  7/25/2013    Procedure: ARTHROSCOPY KNEE WITH PATELLAR REALIGNMENT;  Left Knee Arthroscopy, Medial Patellofemoral Ligament Reconstruction with Allograft  ;  Surgeon: Jennifer Acevedo MD;  Location: [facility]     COLONOSCOPY  2015     DENTAL SURGERY  1996    Teeth removal     ENDOSCOPY UPPER, COLONOSCOPY, COMBINED  2005     HC ESOPH/GAS REFLUX TEST W NASAL IMPED >1 HR N/A 2/15/2017    Procedure: ESOPHAGEAL IMPEDENCE FUNCTION TEST WITH 24 HOUR PH GREATER THAN 1 HOUR;  Surgeon: Timothy Matta MD;  Location: [facility]     IR PICC PLACEMENT > 5 YRS OF AGE  3/13/2019     IR UPPER EXTREMITY VENOGRAM LEFT  2/25/2020     IRRIGATION AND DEBRIDEMENT FOOT, COMBINED Left 3/12/2019    Procedure: COMBINED IRRIGATION AND DEBRIDEMENT LEFT ANKLE;  Surgeon:  Micha Glover MD;  Location: [facility]     IRRIGATION AND DEBRIDEMENT LOWER EXTREMITY, COMBINED Left 5/7/2019    Procedure: 1.  Excision of wound down to and including deep fascia, less than 20 cm2.  2.  Irrigation and debridement, left ankle.;  Surgeon: Andres Johnson DPM;  Location: [facility] OR     PICC INSERTION Left 05/05/2019    4Fr - 45cm (5cm external), Basilic vein, SVC RA junction          Social History:     Social History     Occupational History     Not on file   Tobacco Use     Smoking status: Never Smoker     Smokeless tobacco: Never Used   Substance and Sexual Activity     Alcohol use: Not Currently     Alcohol/week: 1.0 standard drink     Types: 1 Standard drinks or equivalent per week     Comment: rarely     Drug use: Not Currently     Types: Marijuana     Comment: occassional marijuana only, denies others     Sexual activity: Not Currently     Partners: Male     Birth control/protection: Pill          Family History:     Family History   Problem Relation Age of Onset     Depression Mother      Neurologic Disorder Mother         Migraines, take imitrex injection.  Also in maternal grandmother.       Alcohol/Drug Father      Hypertension Father      Depression Father      Osteoarthritis Father      Cardiovascular Maternal Grandmother      Depression Maternal Grandmother      Hypertension Maternal Grandmother      Alzheimer Disease Maternal Grandmother      Cardiovascular Maternal Grandfather      Hypertension Maternal Grandfather      Depression Maternal Grandfather      Alcohol/Drug Maternal Grandfather      Diabetes Maternal Grandfather      Cardiovascular Paternal Grandmother      Hypertension Paternal Grandmother      Cardiovascular Paternal Grandfather      Hypertension Paternal Grandfather      Glaucoma No family hx of      Macular Degeneration No family hx of           Allergies:     Allergies   Allergen Reactions     Amoxil [Penicillins] Rash     Dad unsure of reaction.     Bee  Venom Anaphylaxis     Bioflavonoids Anaphylaxis     Citrus Anaphylaxis     All Redbird     Contrast Dye Rash     Contrast Media Ready-Box [facility] [place], 04/09/2014.; Contrast Media Ready-Box [facility] [place], 04/09/2014.  NOTE: this is a contrast media oral with iodine. Premedicate with methylpred standard for IV contrast, request barium contrast for oral contrast.     Diagnostic X-Ray Materials Hives and Rash     Contrast Media Ready-Box [facility] [place], 04/09/2014.; Contrast Media Ready-Box [facility] [place], 04/09/2014.  NOTE: this is a contrast media oral with iodine. Premedicate with methylpred standard for IV contrast, request barium contrast for oral contrast.     Pineapple Anaphylaxis, Difficulty breathing and Rash     Reglan [Metoclopramide] Other (See Comments)     IV dose only, in ER, rapid heart rate.     Ace Inhibitors      Difficulty in breathing and GI upset     Amitiza [Lubiprostone] Nausea and Vomiting     Amoxicillin-Pot Clavulanate      Midazolam Unknown     parent states that when pt takes this medication, she wakes up being very violent .     Other [No Clinical Screening - See Comments]      Bleech/ chest tightness, itchy throat, swollen tongue, hives     Tizanidine Other (See Comments)     Confusion, back pain, photophobia, abdominal pain, shaking, anxious       Versed      Coming out of pelvic exam at age of 5, was kicking and screaming when coming out of the versed.     Adhesive Tape Rash     Azithromycin Hives and Rash     Cephalexin Itching and Rash     Sulfa Drugs Rash     Skin scarring          Medications:     Current Outpatient Medications   Medication Sig Dispense Refill     albuterol (PROAIR HFA/PROVENTIL HFA/VENTOLIN HFA) 108 (90 Base) MCG/ACT inhaler Inhale 2 puffs into the lungs every 6 hours as needed for shortness of breath / dyspnea or wheezing 1 Inhaler 1     amitriptyline (ELAVIL) 25 MG tablet TAKE 1 TABLET BY MOUTH EVERY NIGHT AT BEDTIME 90 tablet 1     Apremilast (OTEZLA) 10 & 20 & 30 MG TBPK Take by mouth according to  the instructions on the packet. Hold for signs of infection,any GI upset, weight loss or depression concerns, and seek medical attention. 1 each 0     artificial tears OINT ophthalmic ointment 0.5 inch strip each eye at night 1 Tube 11     benzocaine (TOPICALE XTRA) 20 % GEL Apply as needed locally to mouth or nasal ulcers for pain; 4 times daily as needed 30 g 1     betamethasone valerate (VALISONE) 0.1 % cream Apply topically 2 times daily as needed        bisacodyl (DULCOLAX) 5 MG EC tablet Take 2 tablets (10 mg) by mouth daily as needed for constipation 30 tablet 0     citalopram (CELEXA) 20 MG tablet Take 1 tablet (20 mg) by mouth daily 90 tablet 1     colchicine (COLCYRS) 0.6 MG tablet TAKE 1 TABLET (0.6 MG) BY MOUTH 2 TIMES DAILY 180 tablet 1     dexamethasone (DECADRON) 4 MG/ML injection To be used by therapist during PT sessions. 30 mL 0     dicyclomine (BENTYL) 20 MG tablet Take 1 tablet (20 mg) by mouth 4 times daily as needed 120 tablet 3     diphenhydrAMINE (BENADRYL) 25 MG tablet Take 1 tablet (25 mg) by mouth 3 times daily as needed (itching) 40 tablet 1     EPINEPHrine (EPIPEN 2-EAMON) 0.3 MG/0.3ML injection Inject 0.3 mLs (0.3 mg) into the muscle as needed for anaphylaxis 0.6 mL 3     gabapentin (NEURONTIN) 300 MG capsule TAKE ONE CAPSULE BY MOUTH THREE TIMES DAILY  90 capsule 0     hydrOXYzine (ATARAX) 25 MG tablet TAKE ONE OR TWO TABLETS BY MOUTH EVERY SIX HOURS AS NEEDED       hypromellose (GENTEAL) 0.3 % SOLN 1 drop every hour as needed for dry eyes        lidocaine (LMX4) 4 % external cream Apply topically once as needed for mild pain 120 g 1     lidocaine (LMX4) 4 % external cream Apply 1 Application topically once as needed for mild pain       LINZESS 290 MCG capsule TAKE 1 CAPSULE BY MOUTH EVERY DAY IN THE MORNING BEFORE BREAKFAST 90 capsule 0     LORazepam (ATIVAN) 0.5 MG tablet TAKE 1 TABLET BY MOUTH EVERY 6 HOURS AS NEEDED FOR ANXIETY 10 tablet 0     ondansetron (ZOFRAN-ODT) 8 MG ODT tab  Take 1 tablet (8 mg) by mouth every 8 hours as needed for nausea 180 tablet 1     OTEZLA 30 MG tablet TAKE ONE TABLET BY MOUTH TWICE A DAY. HOLD FOR SIGNS OF INFECTION, AND SEEK MEDICAL ATTENTION. 180 tablet 0     polyethylene glycol (MIRALAX/GLYCOLAX) powder Take 1 capful by mouth 3 times daily       rizatriptan (MAXALT) 5 MG tablet Take 1 tablet (5 mg) by mouth at onset of headache for migraine May repeat in 2 hours. Max 6 tablets/24 hours. 10 tablet 1     sodium fluoride 1.1 % CREA Apply 1 Application topically daily       sucralfate (CARAFATE) 1 GM/10ML suspension Take 10 mLs (1 g) by mouth 4 times daily 1200 mL 2     Apixaban Starter Pack (ELIQUIS DVT/PE STARTER PACK) 5 MG TBPK Take by mouth as directed on starter pack. (Patient not taking: Reported on 8/11/2022)       cyproheptadine (PERIACTIN) 4 MG tablet TAKE 2 TABLETS (8 MG) BY MOUTH AT BEDTIME FOR NIGHTMARES (Patient not taking: No sig reported) 180 tablet 1     diclofenac (VOLTAREN) 75 MG EC tablet Take 1 tablet (75 mg) by mouth 2 times daily as needed for moderate pain (Patient not taking: No sig reported) 60 tablet 1     furosemide (LASIX) 20 MG tablet Take 1 tablet (20 mg) by mouth 3 times daily (Patient not taking: Reported on 8/11/2022) 60 tablet 3     furosemide (LASIX) 40 MG tablet TAKE ONE TABLET BY MOUTH TWICE DAILY  (Patient not taking: No sig reported) 60 tablet 0     guanFACINE (TENEX) 2 MG tablet Take one and one half tabs in the morning and one and one half in the evening. (Patient not taking: No sig reported) 270 tablet 3     lactulose 20 GM/30ML SOLN Take 30 mLs by mouth 3 times daily as needed (for constipation) (Patient not taking: No sig reported) 300 mL 3     order for DME Equipment being ordered: Trilok brace 8 1/2 left lower extremity (Patient not taking: No sig reported) 1 Device 0     order for DME Equipment being ordered: size 8 1/2 walking boot tall (Patient not taking: No sig reported) 1 Device 0     predniSONE (DELTASONE) 10 MG  tablet Take 1 tablet (10 mg) by mouth daily (Patient not taking: Reported on 8/11/2022) 30 tablet 1     predniSONE (DELTASONE) 20 MG tablet Take two tablets (= 40mg) each day for 4 (four) days (Patient not taking: No sig reported) 8 tablet 0     triamcinolone (KENALOG) 0.5 % external ointment Apply 1 g topically 3 times daily as needed for irritation (Patient not taking: No sig reported) 15 g 3          Physical Exam:   Constitutional: NAD, pleasant  Eyes: EOMI, nl conj, sclera  MS: no synovitis  Skin: no rash in exposed areas  Neuro: Non focal  Psych: nl affect         Data:     CBC RESULTS:   Recent Labs   Lab Test  06/06/17 2048   WBC  4.7   RBC  4.09   HGB  12.0   HCT  35.9   MCV  88   MCH  29.3   MCHC  33.4   RDW  13.1   PLT  189       Liver Function Studies -   Recent Labs   Lab Test  06/06/17 2048   PROTTOTAL  6.7*   ALBUMIN  3.8   BILITOTAL  0.3   ALKPHOS  91   AST  26   ALT  44       Creatinine   Date Value Ref Range Status   09/07/2020 0.79 0.52 - 1.04 mg/dL Final   ]    No results found for: URIC]    HLA-B27  No results for input(s): [id number], B1 in the last 34977 hours.  RF/CCP  Recent Labs   Lab Test  05/06/16   1554   CCPIGG  1   RHF  <20     CYNDIE/RNP/Sm/SSA/SSB  Recent Labs   Lab Test  11/01/16   1318   TREPAB  Negative     ESR/CRP  Recent Labs   Lab Test  04/21/17   1249  04/14/17   1351  11/06/16   1341  03/11/16   1350  11/18/15   1453   SED   --    --   4  5  6   CRP  <2.9  <2.9  <2.9  <2.9  <2.9       Dominga Sosa MD

## 2022-08-26 ENCOUNTER — TELEPHONE (OUTPATIENT)
Dept: DERMATOLOGY | Facility: CLINIC | Age: 27
End: 2022-08-26

## 2022-08-26 ENCOUNTER — VIRTUAL VISIT (OUTPATIENT)
Dept: DERMATOLOGY | Facility: CLINIC | Age: 27
End: 2022-08-26
Payer: COMMERCIAL

## 2022-08-26 ENCOUNTER — PRE VISIT (OUTPATIENT)
Dept: DERMATOLOGY | Facility: CLINIC | Age: 27
End: 2022-08-26

## 2022-08-26 DIAGNOSIS — M35.2 BEHCET'S DISEASE (H): Primary | ICD-10-CM

## 2022-08-26 PROCEDURE — 99203 OFFICE O/P NEW LOW 30 MIN: CPT | Mod: GT | Performed by: DERMATOLOGY

## 2022-08-26 RX ORDER — ASPIRIN 81 MG/1
TABLET, CHEWABLE ORAL
Status: ON HOLD | COMMUNITY
Start: 2022-08-10 | End: 2023-02-12

## 2022-08-26 RX ORDER — TRIAMCINOLONE ACETONIDE 1 MG/G
OINTMENT TOPICAL
Qty: 454 G | Refills: 2 | Status: SHIPPED | OUTPATIENT
Start: 2022-08-26

## 2022-08-26 RX ORDER — ENOXAPARIN SODIUM 100 MG/ML
INJECTION SUBCUTANEOUS
COMMUNITY
Start: 2022-08-02 | End: 2023-09-27

## 2022-08-26 NOTE — NURSING NOTE
Chief Complaint   Patient presents with     Consult     Ref from Dr. Sosa, Behcet's syndrome and pregnancy      Teledermatology Nurse Call Patients:     Are you in the [facility] at the time of the encounter? yes    Today's visit will be billed to you and your insurance.    A teledermatology visit is not as thorough as an in-person visit and the quality of the photograph sent may not be of the same quality as that taken by the dermatology clinic.    She has not taken guanfacine or cyproheptadine for quite some time. Allergies and medications reviewed.    Linda Camacho, EITANF

## 2022-08-26 NOTE — TELEPHONE ENCOUNTER
Attempted to reach patient to schedule follow up in the Dermatology Clinic.  No answer,  LM on VM to call office.    Schedule with Dr. Cornell Tracey for autoimmune visit on or around 2/26/23.

## 2022-08-26 NOTE — PATIENT INSTRUCTIONS
Continue triamcinolone paste for the oral lesions    Start triamcinolone ointment twice daily as needed for the genital ulcers and for bumps on the body. It is okay to use this here and there on the face. If you are needed this frequently for the face please let us know so we can send something safer for long term use.    Hold off on restarting Otezla until we discuss this with your rheumatologist and reach back out to you.

## 2022-08-26 NOTE — LETTER
8/26/2022       RE: Samara Oropeza  [address]  [address]  [facility] [address]     Dear Colleague,    Thank you for referring your patient, Samara Oropeza, to the [facility] [place] at [facility]. Please see a copy of my visit note below.    [facility] Dermatology Note  Encounter Date: Aug 26, 2022  Video Visit    Dermatology Problem List:  1. Behcet's syndrome   - Diagnosed 2014, primarily managed by rheumatology  - Periodic painful oral/genital (scarring) ulcers, folliculitis, hx positive pathergy test, prior success with apremilast (holding for pregnancy)  - Current: triamcinolone paste for oral lesions, triamcinolone 0.1% oint BID PRN for genital/body lesions  - Prior: colchicine (holding for pregnancy), Humira (ineffective)  - Future: ?restart apremilast (unclear safety in pregnancy, will discuss with rheum), certolizumab    ____________________________________________    Assessment & Plan:     1. Behcet's syndrome   Diagnosed 2014, primarily managed by rheumatology. Currently flaring with oral and genital ulcers as well as folliculitis-like lesions on the arms, chest, legs, face in the setting of discontinuing apremilast and colchicine for pregnancy (almost 12 weeks pregnant). She was told she could restart apremilast (which has historically controlled her symptoms quite well) after the first trimester; will confirm with rheumatology  we do not personally have experience with this medication in pregnancy and there is a paucity of literature on this. We will discuss this with her rheumatologist and optimize her topical regimen.  - Continue triamcinolone paste for oral lesions  - Start triamcinolone 0.1% oint BID PRN for genital/body lesions  - Future: ?restart apremilast (unclear safety in pregnancy, will discuss with rheum), certolizumab    2. Scarring/postinflammatory  hyperpigmentation  She was wondering about options for treating scarring related to her Behcet's. Discussed that we would not recommend pursuing this during her pregnancy or during breastfeeding should she choose to do that. Briefly discussed that depending on what is bothering her (discoloration vs raised or depressed scars) we could try topicals vs lasers.  - Revisit this after she gives birth    Procedures Performed:   None    Follow-up: 6 months (after she has given birth) in-person or virtually or earlier for new or changing lesions    Staff and Resident:     Emily Arredondo MD  Dermatology Resident PGY3    Staff Physician Comments:   I evaluated any available patient photographs with the resident and I edited the assessment and plan as documented in the note. I was present on the line and participated in the entire video visit.    Cornell Tracey MD   of Dermatology  Department of Dermatology  [facility] [employer]      ____________________________________________    CC: Consult (Ref from Dr. Sosa, Behcet's syndrome and pregnancy )    HPI:  Ms. Samara Oropeza is a(n) 26 year old female who presents today as a new patient for Behcet's. She was referred by her rheumatologist Dr. Sosa who she just saw 8/11/23. Her disease was dx in 2014, primarily managed by rheumatology. She stopped her long term treatments apremilast and colchicine when she became pregnant Currently flaring with oral and genital ulcers as well as folliculitis-like lesions on the arms, chest, legs, face. She has topical lidocaine for genital ulcers and triam paste for oral ulcers. She was told she could restart apremilast (which has historically controlled her symptoms quite well) after the first trimester. She wonders what can be done about scarring.    Patient is otherwise feeling well, without additional skin concerns.    Labs Reviewed:  N/A    Physical Exam:  Vitals: There were no vitals  taken for this visit.  Exam limited by poor video connection.   - Scattered hyperpigmented macules c/w with PIH on the face.  - No other lesions of concern on areas examined.     Medications:  Current Outpatient Medications   Medication     albuterol (PROAIR HFA/PROVENTIL HFA/VENTOLIN HFA) 108 (90 Base) MCG/ACT inhaler     amitriptyline (ELAVIL) 25 MG tablet     Apremilast (OTEZLA) 10 & 20 & 30 MG TBPK     apremilast (OTEZLA) 30 MG tablet     artificial tears OINT ophthalmic ointment     benzocaine (TOPICALE XTRA) 20 % GEL     betamethasone valerate (VALISONE) 0.1 % cream     bisacodyl (DULCOLAX) 5 MG EC tablet     citalopram (CELEXA) 20 MG tablet     dexamethasone (DECADRON) 4 MG/ML injection     diclofenac (VOLTAREN) 75 MG EC tablet     dicyclomine (BENTYL) 20 MG tablet     diphenhydrAMINE (BENADRYL) 25 MG tablet     EPINEPHrine (EPIPEN 2-EAMON) 0.3 MG/0.3ML injection     furosemide (LASIX) 40 MG tablet     gabapentin (NEURONTIN) 300 MG capsule     hydrOXYzine (ATARAX) 25 MG tablet     hypromellose (GENTEAL) 0.3 % SOLN     lactulose 20 GM/30ML SOLN     lidocaine (LMX4) 4 % external cream     LINZESS 290 MCG capsule     LORazepam (ATIVAN) 0.5 MG tablet     ondansetron (ZOFRAN-ODT) 8 MG ODT tab     order for DME     order for DME     polyethylene glycol (MIRALAX/GLYCOLAX) powder     rizatriptan (MAXALT) 5 MG tablet     sodium fluoride 1.1 % CREA     sucralfate (CARAFATE) 1 GM/10ML suspension     triamcinolone (KENALOG) 0.1 % paste     triamcinolone (KENALOG) 0.5 % external ointment     Apixaban Starter Pack (ELIQUIS DVT/PE STARTER PACK) 5 MG TBPK     aspirin (ASA) 81 MG chewable tablet     colchicine (COLCYRS) 0.6 MG tablet     cyproheptadine (PERIACTIN) 4 MG tablet     enoxaparin ANTICOAGULANT (LOVENOX) 40 MG/0.4ML syringe     furosemide (LASIX) 20 MG tablet     guanFACINE (TENEX) 2 MG tablet     lidocaine (LMX4) 4 % external cream     Current Facility-Administered Medications   Medication     botulinum toxin type A  (BOTOX) 100 units injection 200 Units     lidocaine 1 % injection 0.5 mL     lidocaine 1 % injection 0.5 mL     lidocaine 1 % injection 0.5 mL     methylPREDNISolone (DEPO-MEDROL) injection 40 mg     triamcinolone (KENALOG-40) injection 40 mg     triamcinolone (KENALOG-40) injection 40 mg     triamcinolone (KENALOG-40) injection 40 mg      Past Medical History:   Patient Active Problem List   Diagnosis     Tics - Tourette syndrome     IBS (irritable bowel syndrome)     Allergic rhinitis     Generalized anxiety disorder     Knee pain     Concussion     Milk protein intolerance     Headaches due to old head injury     Behcet's disease (H)     Migraine     Spell of shaking     On colchicine therapy     Palpitations     Fibromyalgia     Rheumatoid arthritis of multiple sites without rheumatoid factor (H)     Raynaud's disease without gangrene     Chronic abdominal pain     Patellofemoral instability     PTSD (post-traumatic stress disorder)     Cervical dystonia     Acute left ankle pain     Cervical pain     Major depressive disorder, recurrent episode, moderate (H)     Chronic pain syndrome     Convulsions, unspecified convulsion type (H)     Transient alteration of awareness     Vitamin D deficiency     Mobile cecum     Spells of decreased attentiveness     Pelvic floor weakness     Somatic symptom disorder     Gastroparesis     GERD (gastroesophageal reflux disease)     Displacement of lumbar intervertebral disc without myelopathy     Intestinal malabsorption     Iron deficiency associated with nonfamilial restless legs syndrome     Enthesopathy of hip region     Tourette's syndrome     Cellulitis     Infection of joint of ankle (H)     Past Medical History:   Diagnosis Date     Anemia      Anxiety      Arthritis      Behcet's disease (H)      Cervical adenitis May 2010     Chronic abdominal pain      Constipation, chronic 1994     Fibromyalgia      Gastro-oesophageal reflux disease      Gastroparesis      Irregular  heart beat     tachycardia, has had workup     Migraines      Neuromuscular disorder (H)     fibramyalgia     Palpitations      PONV (postoperative nausea and vomiting)      Seizure (H)      Seizures (H)     unknown etiology     Syncope      Tourette's        CC Referred Self, MD   No address on file on close of this encounter.

## 2022-08-26 NOTE — PROGRESS NOTES
[facility] Dermatology Note  Encounter Date: Aug 26, 2022  Video Visit    Dermatology Problem List:  1. Behcet's syndrome   - Diagnosed 2014, primarily managed by rheumatology  - Periodic painful oral/genital (scarring) ulcers, folliculitis, hx positive pathergy test, prior success with apremilast (holding for pregnancy)  - Current: triamcinolone paste for oral lesions, triamcinolone 0.1% oint BID PRN for genital/body lesions  - Prior: colchicine (holding for pregnancy), Humira (ineffective)  - Future: ?restart apremilast (unclear safety in pregnancy, will discuss with rheum), certolizumab    ____________________________________________    Assessment & Plan:     1. Behcet's syndrome   Diagnosed 2014, primarily managed by rheumatology. Currently flaring with oral and genital ulcers as well as folliculitis-like lesions on the arms, chest, legs, face in the setting of discontinuing apremilast and colchicine for pregnancy (almost 12 weeks pregnant). She was told she could restart apremilast (which has historically controlled her symptoms quite well) after the first trimester; will confirm with rheumatology  we do not personally have experience with this medication in pregnancy and there is a paucity of literature on this. We will discuss this with her rheumatologist and optimize her topical regimen.  - Continue triamcinolone paste for oral lesions  - Start triamcinolone 0.1% oint BID PRN for genital/body lesions  - Future: ?restart apremilast (unclear safety in pregnancy, will discuss with rheum), certolizumab    2. Scarring/postinflammatory hyperpigmentation  She was wondering about options for treating scarring related to her Behcet's. Discussed that we would not recommend pursuing this during her pregnancy or during breastfeeding should she choose to do that. Briefly discussed that depending on what is bothering her (discoloration vs raised or depressed scars) we could try topicals vs lasers.  -  Revisit this after she gives birth    Procedures Performed:   None    Follow-up: 6 months (after she has given birth) in-person or virtually or earlier for new or changing lesions    Staff and Resident:     Emily Arredondo MD  Dermatology Resident PGY3    Staff Physician Comments:   I evaluated any available patient photographs with the resident and I edited the assessment and plan as documented in the note. I was present on the line and participated in the entire video visit.    Cornell Tracey MD   of Dermatology  Department of Dermatology  [facility] [employer]      ____________________________________________    CC: Consult (Ref from Dr. Sosa, Behcet's syndrome and pregnancy )    HPI:  Ms. Samara Oropeza is a(n) 26 year old female who presents today as a new patient for Behcet's. She was referred by her rheumatologist Dr. Sosa who she just saw 8/11/23. Her disease was dx in 2014, primarily managed by rheumatology. She stopped her long term treatments apremilast and colchicine when she became pregnant Currently flaring with oral and genital ulcers as well as folliculitis-like lesions on the arms, chest, legs, face. She has topical lidocaine for genital ulcers and triam paste for oral ulcers. She was told she could restart apremilast (which has historically controlled her symptoms quite well) after the first trimester. She wonders what can be done about scarring.    Patient is otherwise feeling well, without additional skin concerns.    Labs Reviewed:  N/A    Physical Exam:  Vitals: There were no vitals taken for this visit.  Exam limited by poor video connection.   - Scattered hyperpigmented macules c/w with PIH on the face.  - No other lesions of concern on areas examined.     Medications:  Current Outpatient Medications   Medication     albuterol (PROAIR HFA/PROVENTIL HFA/VENTOLIN HFA) 108 (90 Base) MCG/ACT inhaler     amitriptyline (ELAVIL) 25 MG tablet      Apremilast (OTEZLA) 10 & 20 & 30 MG TBPK     apremilast (OTEZLA) 30 MG tablet     artificial tears OINT ophthalmic ointment     benzocaine (TOPICALE XTRA) 20 % GEL     betamethasone valerate (VALISONE) 0.1 % cream     bisacodyl (DULCOLAX) 5 MG EC tablet     citalopram (CELEXA) 20 MG tablet     dexamethasone (DECADRON) 4 MG/ML injection     diclofenac (VOLTAREN) 75 MG EC tablet     dicyclomine (BENTYL) 20 MG tablet     diphenhydrAMINE (BENADRYL) 25 MG tablet     EPINEPHrine (EPIPEN 2-EAMON) 0.3 MG/0.3ML injection     furosemide (LASIX) 40 MG tablet     gabapentin (NEURONTIN) 300 MG capsule     hydrOXYzine (ATARAX) 25 MG tablet     hypromellose (GENTEAL) 0.3 % SOLN     lactulose 20 GM/30ML SOLN     lidocaine (LMX4) 4 % external cream     LINZESS 290 MCG capsule     LORazepam (ATIVAN) 0.5 MG tablet     ondansetron (ZOFRAN-ODT) 8 MG ODT tab     order for DME     order for DME     polyethylene glycol (MIRALAX/GLYCOLAX) powder     rizatriptan (MAXALT) 5 MG tablet     sodium fluoride 1.1 % CREA     sucralfate (CARAFATE) 1 GM/10ML suspension     triamcinolone (KENALOG) 0.1 % paste     triamcinolone (KENALOG) 0.5 % external ointment     Apixaban Starter Pack (ELIQUIS DVT/PE STARTER PACK) 5 MG TBPK     aspirin (ASA) 81 MG chewable tablet     colchicine (COLCYRS) 0.6 MG tablet     cyproheptadine (PERIACTIN) 4 MG tablet     enoxaparin ANTICOAGULANT (LOVENOX) 40 MG/0.4ML syringe     furosemide (LASIX) 20 MG tablet     guanFACINE (TENEX) 2 MG tablet     lidocaine (LMX4) 4 % external cream     Current Facility-Administered Medications   Medication     botulinum toxin type A (BOTOX) 100 units injection 200 Units     lidocaine 1 % injection 0.5 mL     lidocaine 1 % injection 0.5 mL     lidocaine 1 % injection 0.5 mL     methylPREDNISolone (DEPO-MEDROL) injection 40 mg     triamcinolone (KENALOG-40) injection 40 mg     triamcinolone (KENALOG-40) injection 40 mg     triamcinolone (KENALOG-40) injection 40 mg      Past Medical History:    Patient Active Problem List   Diagnosis     Tics - Tourette syndrome     IBS (irritable bowel syndrome)     Allergic rhinitis     Generalized anxiety disorder     Knee pain     Concussion     Milk protein intolerance     Headaches due to old head injury     Behcet's disease (H)     Migraine     Spell of shaking     On colchicine therapy     Palpitations     Fibromyalgia     Rheumatoid arthritis of multiple sites without rheumatoid factor (H)     Raynaud's disease without gangrene     Chronic abdominal pain     Patellofemoral instability     PTSD (post-traumatic stress disorder)     Cervical dystonia     Acute left ankle pain     Cervical pain     Major depressive disorder, recurrent episode, moderate (H)     Chronic pain syndrome     Convulsions, unspecified convulsion type (H)     Transient alteration of awareness     Vitamin D deficiency     Mobile cecum     Spells of decreased attentiveness     Pelvic floor weakness     Somatic symptom disorder     Gastroparesis     GERD (gastroesophageal reflux disease)     Displacement of lumbar intervertebral disc without myelopathy     Intestinal malabsorption     Iron deficiency associated with nonfamilial restless legs syndrome     Enthesopathy of hip region     Tourette's syndrome     Cellulitis     Infection of joint of ankle (H)     Past Medical History:   Diagnosis Date     Anemia      Anxiety      Arthritis      Behcet's disease (H)      Cervical adenitis May 2010     Chronic abdominal pain      Constipation, chronic 1994     Fibromyalgia      Gastro-oesophageal reflux disease      Gastroparesis      Irregular heart beat     tachycardia, has had workup     Migraines      Neuromuscular disorder (H)     fibramyalgia     Palpitations      PONV (postoperative nausea and vomiting)      Seizure (H)      Seizures (H)     unknown etiology     Syncope      Tourette's        CC Referred Self, MD   No address on file on close of this encounter.

## 2022-09-08 NOTE — PROGRESS NOTES
"Foot & Ankle Surgery  January 15, 2019    S:  Patient in today approx 2 weeks sp ankle scope, ATFL repair and sinus tarsi evacuation.  Pain levels improving.  She was feeling under the weather since we saw her last but is feeling better.  Pain levels low in ankle today, although the sutures have been bothering her.      Resp 18   Ht 1.588 m (5' 2.5\")   Wt 68.9 kg (152 lb)   LMP  (LMP Unknown)   BMI 27.36 kg/m        ROS - positive for CC.  Patient denies current nausea, vomiting, chills, fevers, belly pain, calf pain, chest pain or SOB.  Complete remainder of ROS is otherwise neg.    PE - sutures intact, skin margins well coapted.  The medial portal sutures were removed and the incision gapped, so remaining sutures were left intact.  No drainage, no SOI.  Anterior drawer shows zero instability and essentially zero pain.  Skin shows no trophic, color or temperature changes otherwise.  No calf redness, swelling or pain noted otherwise.    A/P - 23 year old yo patient approx 2 weeks sp above procedure  -steri-strip applied to anteromedial portal incision.  She states paper tape irritates her skin, so I advised she come back for a recheck if this is irritating her skin  -continue RICE/NSAID vs tylenol prn based on pain  -we'll hold off on PT until the incisions are better healed  -ok to start protected WB in boot(dispensed) and crutches(has); increase weight to tolerance but avoid exceeding pain threshold    Follow up  -  1 week or sooner with acute issues      Body mass index is 27.36 kg/m .  Weight management plan: Patient was referred to their PCP to discuss a diet and exercise plan.      Andres Johnson DPM FACFAS FACFAOM  Podiatric Foot & Ankle Surgeon  [facility]  [phone]    "
pandemic precautions

## 2022-09-11 ENCOUNTER — MYC MEDICAL ADVICE (OUTPATIENT)
Dept: PHYSICAL MEDICINE AND REHAB | Facility: CLINIC | Age: 27
End: 2022-09-11

## 2022-09-12 NOTE — TELEPHONE ENCOUNTER
Dr. Hernandez called and discussed tomorrow's appointment with patient. Closing this encounter.  Ashtyn Meza RN on 9/12/2022 at 2:39 PM

## 2022-09-13 ENCOUNTER — OFFICE VISIT (OUTPATIENT)
Dept: PHYSICAL MEDICINE AND REHAB | Facility: CLINIC | Age: 27
End: 2022-09-13
Payer: COMMERCIAL

## 2022-09-13 DIAGNOSIS — M79.18 MYOFASCIAL PAIN: ICD-10-CM

## 2022-09-13 DIAGNOSIS — M54.81 BILATERAL OCCIPITAL NEURALGIA: ICD-10-CM

## 2022-09-13 DIAGNOSIS — G43.719 CHRONIC MIGRAINE WITHOUT AURA, INTRACTABLE, WITHOUT STATUS MIGRAINOSUS: ICD-10-CM

## 2022-09-13 PROCEDURE — 99213 OFFICE O/P EST LOW 20 MIN: CPT | Mod: 25 | Performed by: PHYSICAL MEDICINE & REHABILITATION

## 2022-09-13 PROCEDURE — 96372 THER/PROPH/DIAG INJ SC/IM: CPT | Performed by: PHYSICAL MEDICINE & REHABILITATION

## 2022-09-13 PROCEDURE — 64405 NJX AA&/STRD GR OCPL NRV: CPT | Mod: 59 | Performed by: PHYSICAL MEDICINE & REHABILITATION

## 2022-09-13 PROCEDURE — 20552 NJX 1/MLT TRIGGER POINT 1/2: CPT | Mod: 59 | Performed by: PHYSICAL MEDICINE & REHABILITATION

## 2022-09-13 RX ORDER — METHYLPREDNISOLONE SODIUM SUCCINATE 40 MG/ML
40 INJECTION, POWDER, LYOPHILIZED, FOR SOLUTION INTRAMUSCULAR; INTRAVENOUS ONCE
Status: COMPLETED | OUTPATIENT
Start: 2022-09-13 | End: 2022-09-13

## 2022-09-13 RX ORDER — LIDOCAINE HYDROCHLORIDE 20 MG/ML
15 INJECTION, SOLUTION INFILTRATION; PERINEURAL ONCE
Status: COMPLETED | OUTPATIENT
Start: 2022-09-13 | End: 2022-09-13

## 2022-09-13 RX ADMIN — METHYLPREDNISOLONE SODIUM SUCCINATE 40 MG: 40 INJECTION, POWDER, LYOPHILIZED, FOR SOLUTION INTRAMUSCULAR; INTRAVENOUS at 13:10

## 2022-09-13 RX ADMIN — LIDOCAINE HYDROCHLORIDE 15 ML: 20 INJECTION, SOLUTION INFILTRATION; PERINEURAL at 13:10

## 2022-09-13 NOTE — LETTER
9/13/2022         RE: Samara Oropeza  [address]  [address]  [facility] [place] [address]        Dear Colleague,    Thank you for referring your patient, Samara Oropeza, to the [facility]. Please see a copy of my visit note below.        [facility]     PHYSICAL MEDICINE & REHABILITATION CLINIC NOTE  OCCIPITAL NERVE BLOCK        No chief complaint on file.    HPI:  Samara Oropeza is a 26 year old female with a history of chronic migraine headaches who presents today for bilateral occipital nerve blocks and trigger point injections with the goal of minimizing her occipital headaches.     She has been receiving Botox injections for several years for management of chronic migraine headaches, but found out she was pregnant recently and is opting to try ONBs/TPIs at this time. She is currently 15 weeks pregnant. She has been told by her maternal fetal medicine physician that Botox injections are ok to continue if needed/desired for the duration of her pregnancy to manage her chronic migraine headaches. Patient reports today that headaches have been worse recently. Today, she is experiencing a bad headache with nausea.       Current Outpatient Medications   Medication Sig Dispense Refill     albuterol (PROAIR HFA/PROVENTIL HFA/VENTOLIN HFA) 108 (90 Base) MCG/ACT inhaler Inhale 2 puffs into the lungs every 6 hours as needed for shortness of breath / dyspnea or wheezing 1 Inhaler 1     amitriptyline (ELAVIL) 25 MG tablet TAKE 1 TABLET BY MOUTH EVERY NIGHT AT BEDTIME 90 tablet 1     Apixaban Starter Pack (ELIQUIS DVT/PE STARTER PACK) 5 MG TBPK Take by mouth as directed on starter pack. (Patient not taking: No sig reported)       Apremilast (OTEZLA) 10 & 20 & 30 MG TBPK Take by mouth according to the instructions on the packet. Hold for signs of infection,any GI upset, weight loss or depression concerns, and seek medical  attention. 1 each 0     apremilast (OTEZLA) 30 MG tablet TAKE ONE TABLET BY MOUTH TWICE A DAY. HOLD FOR SIGNS OF INFECTION, AND SEEK MEDICAL ATTENTION. 180 tablet 0     artificial tears OINT ophthalmic ointment 0.5 inch strip each eye at night 1 Tube 11     aspirin (ASA) 81 MG chewable tablet        benzocaine (TOPICALE XTRA) 20 % GEL Apply as needed locally to mouth or nasal ulcers for pain; 4 times daily as needed 30 g 1     betamethasone valerate (VALISONE) 0.1 % cream Apply topically 2 times daily as needed        bisacodyl (DULCOLAX) 5 MG EC tablet Take 2 tablets (10 mg) by mouth daily as needed for constipation 30 tablet 0     citalopram (CELEXA) 20 MG tablet Take 1 tablet (20 mg) by mouth daily 90 tablet 1     colchicine (COLCYRS) 0.6 MG tablet TAKE 1 TABLET (0.6 MG) BY MOUTH 2 TIMES DAILY (Patient not taking: Reported on 8/26/2022) 180 tablet 1     cyproheptadine (PERIACTIN) 4 MG tablet TAKE 2 TABLETS (8 MG) BY MOUTH AT BEDTIME FOR NIGHTMARES (Patient not taking: Reported on 8/26/2022) 180 tablet 1     dexamethasone (DECADRON) 4 MG/ML injection To be used by therapist during PT sessions. 30 mL 0     diclofenac (VOLTAREN) 75 MG EC tablet Take 1 tablet (75 mg) by mouth 2 times daily as needed for moderate pain 60 tablet 1     dicyclomine (BENTYL) 20 MG tablet Take 1 tablet (20 mg) by mouth 4 times daily as needed 120 tablet 3     diphenhydrAMINE (BENADRYL) 25 MG tablet Take 1 tablet (25 mg) by mouth 3 times daily as needed (itching) 40 tablet 1     enoxaparin ANTICOAGULANT (LOVENOX) 40 MG/0.4ML syringe        EPINEPHrine (EPIPEN 2-EAMON) 0.3 MG/0.3ML injection Inject 0.3 mLs (0.3 mg) into the muscle as needed for anaphylaxis 0.6 mL 3     furosemide (LASIX) 20 MG tablet Take 1 tablet (20 mg) by mouth 3 times daily (Patient not taking: Reported on 8/26/2022) 60 tablet 3     furosemide (LASIX) 40 MG tablet TAKE ONE TABLET BY MOUTH TWICE DAILY  60 tablet 0     gabapentin (NEURONTIN) 300 MG capsule TAKE ONE CAPSULE  BY MOUTH THREE TIMES DAILY  90 capsule 0     guanFACINE (TENEX) 2 MG tablet Take one and one half tabs in the morning and one and one half in the evening. (Patient not taking: Reported on 8/26/2022) 270 tablet 3     hydrOXYzine (ATARAX) 25 MG tablet TAKE ONE OR TWO TABLETS BY MOUTH EVERY SIX HOURS AS NEEDED       hypromellose (GENTEAL) 0.3 % SOLN 1 drop every hour as needed for dry eyes        lactulose 20 GM/30ML SOLN Take 30 mLs by mouth 3 times daily as needed (for constipation) 300 mL 3     lidocaine (LMX4) 4 % external cream Apply topically once as needed for mild pain 120 g 1     lidocaine (LMX4) 4 % external cream Apply 1 Application topically once as needed for mild pain (Patient not taking: Reported on 8/26/2022)       LINZESS 290 MCG capsule TAKE 1 CAPSULE BY MOUTH EVERY DAY IN THE MORNING BEFORE BREAKFAST 90 capsule 0     LORazepam (ATIVAN) 0.5 MG tablet TAKE 1 TABLET BY MOUTH EVERY 6 HOURS AS NEEDED FOR ANXIETY 10 tablet 0     ondansetron (ZOFRAN-ODT) 8 MG ODT tab Take 1 tablet (8 mg) by mouth every 8 hours as needed for nausea 180 tablet 1     order for DME Equipment being ordered: Trilok brace 8 1/2 left lower extremity 1 Device 0     order for DME Equipment being ordered: size 8 1/2 walking boot tall 1 Device 0     polyethylene glycol (MIRALAX/GLYCOLAX) powder Take 1 capful by mouth 3 times daily       rizatriptan (MAXALT) 5 MG tablet Take 1 tablet (5 mg) by mouth at onset of headache for migraine May repeat in 2 hours. Max 6 tablets/24 hours. 10 tablet 1     sodium fluoride 1.1 % CREA Apply 1 Application topically daily       sucralfate (CARAFATE) 1 GM/10ML suspension Take 10 mLs (1 g) by mouth 4 times daily 1200 mL 2     triamcinolone (KENALOG) 0.1 % external ointment Apply twice daily as needed to lesions on the genitals and body. OK to use very sparingly on the face. 454 g 2     triamcinolone (KENALOG) 0.1 % paste Take by mouth 2 times daily 5 g 5     triamcinolone (KENALOG) 0.5 % external  ointment Apply 1 g topically 3 times daily as needed for irritation 15 g 3       Allergies   Allergen Reactions     Amoxil [Penicillins] Rash     Dad unsure of reaction.     Bee Venom Anaphylaxis     Bioflavonoids Anaphylaxis     Citrus Anaphylaxis     All [place]     Contrast Dye Rash     Contrast Media Ready-Box [facility] [place], 04/09/2014.; Contrast Media Ready-Box [facility], 04/09/2014.  NOTE: this is a contrast media oral with iodine. Premedicate with methylpred standard for IV contrast, request barium contrast for oral contrast.     Diagnostic X-Ray Materials Hives and Rash     Contrast Media Ready-Box [facility], 04/09/2014.; Contrast Media Ready-Box [facility], 04/09/2014.  NOTE: this is a contrast media oral with iodine. Premedicate with methylpred standard for IV contrast, request barium contrast for oral contrast.     Pineapple Anaphylaxis, Difficulty breathing and Rash     Reglan [Metoclopramide] Other (See Comments)     IV dose only, in ER, rapid heart rate.     Ace Inhibitors      Difficulty in breathing and GI upset     Amitiza [Lubiprostone] Nausea and Vomiting     Amoxicillin-Pot Clavulanate      Midazolam Unknown     parent states that when pt takes this medication, she wakes up being very violent .     Other [No Clinical Screening - See Comments]      Bleech/ chest tightness, itchy throat, swollen tongue, hives     Tizanidine Other (See Comments)     Confusion, back pain, photophobia, abdominal pain, shaking, anxious       Versed      Coming out of pelvic exam at age of 5, was kicking and screaming when coming out of the versed.     Adhesive Tape Rash     Azithromycin Hives and Rash     Cephalexin Itching and Rash     Sulfa Drugs Rash     Skin scarring       PHYSICAL EXAM:  VS: There were no vitals taken for this visit.   GEN: Patient is pleasant and cooperative  SKIN: No lesions or abrasions on exposed skin      PROCEDURE: Right and Left greater occipital nerve block, Trigger point injections.     Prior to the start of the  procedure and with procedural staff participation, I verbally confirmed the patient s identity using two indicators, relevant allergies, that the procedure was appropriate and matched the consent or emergent situation, and that the correct equipment/implants were available. Immediately prior to starting the procedure I conducted the Time Out with the procedural staff and re-confirmed the patient s name, procedure, and site/side. (The Joint Commission universal protocol was followed.)  Yes    Sedation (Moderate or Deep): None    Dru% lidocaine: 15 ml (Lot: [id number], Exp: 2023, NDC: 5374-9765-77)  Methylprednisolone: 40 mg = 1 ml (Lot: [id number], Exp: 2023, NDC: [address]-4)    Occipital nerve block:  Area just inferior to insertion of the right and left superior trapezius insertion onto skull was cleansed with ChloraPrep. Needle was advanced anteriorly to base of skull then slightly withdrawn and injectate was injected in a fan-like distribution at different depths. A 10 ml mixture of 2% lidocaine (9 ml) and methylprednisolone (40 mg/ml = 1 ml) was divided into two 5 ml syringes. Total injection volume = 5 ml per side.     Trigger point injections:  Using standard precautions a 30-gauge 1-inch needle was used to inject 9 ml of 2% lidocaine into the upper trapezius and temporalis muscle(s) for a total of 16 sites.      Samara Oropeza tolerated the procedure well without any immediate complications. she was allowed to recover for an appropriate period of time and was discharged home in stable condition.  Patient will follow-up regarding response to this procedure.    ASSESSMENT/PLAN:  Samara Oropeza is a 26 year old female who presents to clinic for bilateral occipital nerve blocks and trigger point injections for headache management in the setting of pregnancy.      She has been getting Botox injections every 12 weeks for several years for management of her chronic migraine headaches, but now  that she is pregnant, she would like to explore other options for headache management. We agreed upon a trial of trigger point injections and occipital nerve blocks for today.      She has been cleared by her maternal fetal medicine doctor to continue with Botox throughout her pregnancy if needed.  Additionally, she was provided with articles and information on the safety of Botox during pregnancy at today's visit.  There are essentially no studies or adequate data from post marketing surveillance on the developmental risk associated with the use of Botox in pregnant women.  In animal studies, administration of Botox during pregnancy resulted in adverse effects on fetal growth, however, retrospective studies on Botox administration in humans during pregnancy has not identified any significant risk of birth defects of miscarriage greater than the risk in the general population, and therefore, it is likely a safe option for headache management during pregnancy.     We agreed that if the occipital nerve blocks/trigger point injections do not provide her with any long-lasting benefit, she will likely call to schedule Botox injections.  Otherwise, we will proceed with occipital nerve block/trigger point injections every 3 months throughout the remainder of her pregnancy as needed.      Alejandrina Hernandez MD       I spent a total of 20 minutes, face-to-face and managing the care of Samara Oropeza, excluding the procedure. Over 50% of my time was spent counseling the patient and coordinating care. Please see note for details.         Again, thank you for allowing me to participate in the care of your patient.        Sincerely,        Alejandrina Hernandez MD

## 2022-09-13 NOTE — PROGRESS NOTES
[facility]     PHYSICAL MEDICINE & REHABILITATION CLINIC NOTE  OCCIPITAL NERVE BLOCK        No chief complaint on file.    HPI:  Samara Oropeza is a 26 year old female with a history of chronic migraine headaches who presents today for bilateral occipital nerve blocks and trigger point injections with the goal of minimizing her occipital headaches.     She has been receiving Botox injections for several years for management of chronic migraine headaches, but found out she was pregnant recently and is opting to try ONBs/TPIs at this time. She is currently 15 weeks pregnant. She has been told by her maternal fetal medicine physician that Botox injections are ok to continue if needed/desired for the duration of her pregnancy to manage her chronic migraine headaches. Patient reports today that headaches have been worse recently. Today, she is experiencing a bad headache with nausea.       Current Outpatient Medications   Medication Sig Dispense Refill     albuterol (PROAIR HFA/PROVENTIL HFA/VENTOLIN HFA) 108 (90 Base) MCG/ACT inhaler Inhale 2 puffs into the lungs every 6 hours as needed for shortness of breath / dyspnea or wheezing 1 Inhaler 1     amitriptyline (ELAVIL) 25 MG tablet TAKE 1 TABLET BY MOUTH EVERY NIGHT AT BEDTIME 90 tablet 1     Apixaban Starter Pack (ELIQUIS DVT/PE STARTER PACK) 5 MG TBPK Take by mouth as directed on starter pack. (Patient not taking: No sig reported)       Apremilast (OTEZLA) 10 & 20 & 30 MG TBPK Take by mouth according to the instructions on the packet. Hold for signs of infection,any GI upset, weight loss or depression concerns, and seek medical attention. 1 each 0     apremilast (OTEZLA) 30 MG tablet TAKE ONE TABLET BY MOUTH TWICE A DAY. HOLD FOR SIGNS OF INFECTION, AND SEEK MEDICAL ATTENTION. 180 tablet 0     artificial tears OINT ophthalmic ointment 0.5 inch strip each eye at night 1 Tube 11     aspirin (ASA) 81 MG chewable tablet         benzocaine (TOPICALE XTRA) 20 % GEL Apply as needed locally to mouth or nasal ulcers for pain; 4 times daily as needed 30 g 1     betamethasone valerate (VALISONE) 0.1 % cream Apply topically 2 times daily as needed        bisacodyl (DULCOLAX) 5 MG EC tablet Take 2 tablets (10 mg) by mouth daily as needed for constipation 30 tablet 0     citalopram (CELEXA) 20 MG tablet Take 1 tablet (20 mg) by mouth daily 90 tablet 1     colchicine (COLCYRS) 0.6 MG tablet TAKE 1 TABLET (0.6 MG) BY MOUTH 2 TIMES DAILY (Patient not taking: Reported on 8/26/2022) 180 tablet 1     cyproheptadine (PERIACTIN) 4 MG tablet TAKE 2 TABLETS (8 MG) BY MOUTH AT BEDTIME FOR NIGHTMARES (Patient not taking: Reported on 8/26/2022) 180 tablet 1     dexamethasone (DECADRON) 4 MG/ML injection To be used by therapist during PT sessions. 30 mL 0     diclofenac (VOLTAREN) 75 MG EC tablet Take 1 tablet (75 mg) by mouth 2 times daily as needed for moderate pain 60 tablet 1     dicyclomine (BENTYL) 20 MG tablet Take 1 tablet (20 mg) by mouth 4 times daily as needed 120 tablet 3     diphenhydrAMINE (BENADRYL) 25 MG tablet Take 1 tablet (25 mg) by mouth 3 times daily as needed (itching) 40 tablet 1     enoxaparin ANTICOAGULANT (LOVENOX) 40 MG/0.4ML syringe        EPINEPHrine (EPIPEN 2-EAMON) 0.3 MG/0.3ML injection Inject 0.3 mLs (0.3 mg) into the muscle as needed for anaphylaxis 0.6 mL 3     furosemide (LASIX) 20 MG tablet Take 1 tablet (20 mg) by mouth 3 times daily (Patient not taking: Reported on 8/26/2022) 60 tablet 3     furosemide (LASIX) 40 MG tablet TAKE ONE TABLET BY MOUTH TWICE DAILY  60 tablet 0     gabapentin (NEURONTIN) 300 MG capsule TAKE ONE CAPSULE BY MOUTH THREE TIMES DAILY  90 capsule 0     guanFACINE (TENEX) 2 MG tablet Take one and one half tabs in the morning and one and one half in the evening. (Patient not taking: Reported on 8/26/2022) 270 tablet 3     hydrOXYzine (ATARAX) 25 MG tablet TAKE ONE OR TWO TABLETS BY MOUTH EVERY SIX HOURS  AS NEEDED       hypromellose (GENTEAL) 0.3 % SOLN 1 drop every hour as needed for dry eyes        lactulose 20 GM/30ML SOLN Take 30 mLs by mouth 3 times daily as needed (for constipation) 300 mL 3     lidocaine (LMX4) 4 % external cream Apply topically once as needed for mild pain 120 g 1     lidocaine (LMX4) 4 % external cream Apply 1 Application topically once as needed for mild pain (Patient not taking: Reported on 8/26/2022)       LINZESS 290 MCG capsule TAKE 1 CAPSULE BY MOUTH EVERY DAY IN THE MORNING BEFORE BREAKFAST 90 capsule 0     LORazepam (ATIVAN) 0.5 MG tablet TAKE 1 TABLET BY MOUTH EVERY 6 HOURS AS NEEDED FOR ANXIETY 10 tablet 0     ondansetron (ZOFRAN-ODT) 8 MG ODT tab Take 1 tablet (8 mg) by mouth every 8 hours as needed for nausea 180 tablet 1     order for DME Equipment being ordered: Trilok brace 8 1/2 left lower extremity 1 Device 0     order for DME Equipment being ordered: size 8 1/2 walking boot tall 1 Device 0     polyethylene glycol (MIRALAX/GLYCOLAX) powder Take 1 capful by mouth 3 times daily       rizatriptan (MAXALT) 5 MG tablet Take 1 tablet (5 mg) by mouth at onset of headache for migraine May repeat in 2 hours. Max 6 tablets/24 hours. 10 tablet 1     sodium fluoride 1.1 % CREA Apply 1 Application topically daily       sucralfate (CARAFATE) 1 GM/10ML suspension Take 10 mLs (1 g) by mouth 4 times daily 1200 mL 2     triamcinolone (KENALOG) 0.1 % external ointment Apply twice daily as needed to lesions on the genitals and body. OK to use very sparingly on the face. 454 g 2     triamcinolone (KENALOG) 0.1 % paste Take by mouth 2 times daily 5 g 5     triamcinolone (KENALOG) 0.5 % external ointment Apply 1 g topically 3 times daily as needed for irritation 15 g 3       Allergies   Allergen Reactions     Amoxil [Penicillins] Rash     Dad unsure of reaction.     Bee Venom Anaphylaxis     Bioflavonoids Anaphylaxis     Citrus Anaphylaxis     All [place]     Contrast Dye Rash     Contrast Media  Ready-Box [facility], 04/09/2014.; Contrast Media Ready-Box [facility], 04/09/2014.  NOTE: this is a contrast media oral with iodine. Premedicate with methylpred standard for IV contrast, request barium contrast for oral contrast.     Diagnostic X-Ray Materials Hives and Rash     Contrast Media Ready-Box [facility], 04/09/2014.; Contrast Media Ready-Box [facility] [place], 04/09/2014.  NOTE: this is a contrast media oral with iodine. Premedicate with methylpred standard for IV contrast, request barium contrast for oral contrast.     Pineapple Anaphylaxis, Difficulty breathing and Rash     Reglan [Metoclopramide] Other (See Comments)     IV dose only, in ER, rapid heart rate.     Ace Inhibitors      Difficulty in breathing and GI upset     Amitiza [Lubiprostone] Nausea and Vomiting     Amoxicillin-Pot Clavulanate      Midazolam Unknown     parent states that when pt takes this medication, she wakes up being very violent .     Other [No Clinical Screening - See Comments]      Bleech/ chest tightness, itchy throat, swollen tongue, hives     Tizanidine Other (See Comments)     Confusion, back pain, photophobia, abdominal pain, shaking, anxious       Versed      Coming out of pelvic exam at age of 5, was kicking and screaming when coming out of the versed.     Adhesive Tape Rash     Azithromycin Hives and Rash     Cephalexin Itching and Rash     Sulfa Drugs Rash     Skin scarring       PHYSICAL EXAM:  VS: There were no vitals taken for this visit.   GEN: Patient is pleasant and cooperative  SKIN: No lesions or abrasions on exposed skin      PROCEDURE: Right and Left greater occipital nerve block, Trigger point injections.     Prior to the start of the procedure and with procedural staff participation, I verbally confirmed the patient s identity using two indicators, relevant allergies, that the procedure was appropriate and matched the consent or emergent situation, and that the correct equipment/implants were available. Immediately prior to starting  the procedure I conducted the Time Out with the procedural staff and re-confirmed the patient s name, procedure, and site/side. (The Joint Commission universal protocol was followed.)  Yes    Sedation (Moderate or Deep): None    Dru% lidocaine: 15 ml (Lot: [id number], Exp: 2023, NDC: 9552-3382-91)  Methylprednisolone: 40 mg = 1 ml (Lot: [id number], Exp: 2023, NDC: [address]-0)    Occipital nerve block:  Area just inferior to insertion of the right and left superior trapezius insertion onto skull was cleansed with ChloraPrep. Needle was advanced anteriorly to base of skull then slightly withdrawn and injectate was injected in a fan-like distribution at different depths. A 10 ml mixture of 2% lidocaine (9 ml) and methylprednisolone (40 mg/ml = 1 ml) was divided into two 5 ml syringes. Total injection volume = 5 ml per side.     Trigger point injections:  Using standard precautions a 30-gauge 1-inch needle was used to inject 9 ml of 2% lidocaine into the upper trapezius and temporalis muscle(s) for a total of 16 sites.      Samara Oropeza tolerated the procedure well without any immediate complications. she was allowed to recover for an appropriate period of time and was discharged home in stable condition.  Patient will follow-up regarding response to this procedure.    ASSESSMENT/PLAN:  Samara Oropeza is a 26 year old female who presents to clinic for bilateral occipital nerve blocks and trigger point injections for headache management in the setting of pregnancy.      She has been getting Botox injections every 12 weeks for several years for management of her chronic migraine headaches, but now that she is pregnant, she would like to explore other options for headache management. We agreed upon a trial of trigger point injections and occipital nerve blocks for today.      She has been cleared by her maternal fetal medicine doctor to continue with Botox throughout her pregnancy if needed.   Additionally, she was provided with articles and information on the safety of Botox during pregnancy at today's visit.  There are essentially no studies or adequate data from post marketing surveillance on the developmental risk associated with the use of Botox in pregnant women.  In animal studies, administration of Botox during pregnancy resulted in adverse effects on fetal growth, however, retrospective studies on Botox administration in humans during pregnancy has not identified any significant risk of birth defects of miscarriage greater than the risk in the general population, and therefore, it is likely a safe option for headache management during pregnancy.     We agreed that if the occipital nerve blocks/trigger point injections do not provide her with any long-lasting benefit, she will likely call to schedule Botox injections.  Otherwise, we will proceed with occipital nerve block/trigger point injections every 3 months throughout the remainder of her pregnancy as needed.      Alejandrina Hernandez MD       I spent a total of 20 minutes, face-to-face and managing the care of Samara Oropeza, excluding the procedure. Over 50% of my time was spent counseling the patient and coordinating care. Please see note for details.

## 2022-09-26 DIAGNOSIS — G43.719 CHRONIC MIGRAINE WITHOUT AURA, INTRACTABLE, WITHOUT STATUS MIGRAINOSUS: ICD-10-CM

## 2022-09-27 RX ORDER — RIZATRIPTAN BENZOATE 5 MG/1
TABLET ORAL
Qty: 10 TABLET | Refills: 0 | OUTPATIENT
Start: 2022-09-27

## 2022-10-07 DIAGNOSIS — M35.2 BEHCET'S DISEASE (H): ICD-10-CM

## 2022-10-08 DIAGNOSIS — G43.719 CHRONIC MIGRAINE WITHOUT AURA, INTRACTABLE, WITHOUT STATUS MIGRAINOSUS: ICD-10-CM

## 2022-10-10 NOTE — TELEPHONE ENCOUNTER
Please send over a new order.   Sig should be 1qd while pregnant    Please review and send over if applicable.    Thank you,  [facility]/ mail order Services  [phone]

## 2022-10-11 RX ORDER — APREMILAST 30 MG/1
30 TABLET, FILM COATED ORAL DAILY
Qty: 90 TABLET | Refills: 0 | Status: SHIPPED | OUTPATIENT
Start: 2022-10-11 | End: 2022-10-28

## 2022-10-12 RX ORDER — RIZATRIPTAN BENZOATE 5 MG/1
TABLET ORAL
Qty: 10 TABLET | Refills: 0 | OUTPATIENT
Start: 2022-10-12

## 2022-10-28 ENCOUNTER — LAB (OUTPATIENT)
Dept: LAB | Facility: CLINIC | Age: 27
End: 2022-10-28
Payer: COMMERCIAL

## 2022-10-28 ENCOUNTER — TELEPHONE (OUTPATIENT)
Dept: OPHTHALMOLOGY | Facility: CLINIC | Age: 27
End: 2022-10-28

## 2022-10-28 ENCOUNTER — OFFICE VISIT (OUTPATIENT)
Dept: RHEUMATOLOGY | Facility: CLINIC | Age: 27
End: 2022-10-28
Attending: INTERNAL MEDICINE
Payer: COMMERCIAL

## 2022-10-28 VITALS
SYSTOLIC BLOOD PRESSURE: 128 MMHG | DIASTOLIC BLOOD PRESSURE: 83 MMHG | OXYGEN SATURATION: 97 % | WEIGHT: 158.9 LBS | HEART RATE: 82 BPM | HEIGHT: 63 IN | BODY MASS INDEX: 28.16 KG/M2

## 2022-10-28 DIAGNOSIS — M35.2 BEHCET'S DISEASE (H): ICD-10-CM

## 2022-10-28 DIAGNOSIS — K13.79 RECURRENT ORAL ULCERS: ICD-10-CM

## 2022-10-28 DIAGNOSIS — M35.2 BEHCET'S DISEASE (H): Primary | ICD-10-CM

## 2022-10-28 LAB
ALBUMIN SERPL BCG-MCNC: 4.1 G/DL (ref 3.5–5.2)
ALT SERPL W P-5'-P-CCNC: 16 U/L (ref 10–35)
AST SERPL W P-5'-P-CCNC: 22 U/L (ref 10–35)
CREAT SERPL-MCNC: 0.59 MG/DL (ref 0.51–0.95)
CRP SERPL-MCNC: <3 MG/L
ERYTHROCYTE [SEDIMENTATION RATE] IN BLOOD BY WESTERGREN METHOD: 17 MM/HR (ref 0–20)
GFR SERPL CREATININE-BSD FRML MDRD: >90 ML/MIN/1.73M2
URATE SERPL-MCNC: 3 MG/DL (ref 2.4–5.7)

## 2022-10-28 PROCEDURE — 82040 ASSAY OF SERUM ALBUMIN: CPT | Performed by: PATHOLOGY

## 2022-10-28 PROCEDURE — 36415 COLL VENOUS BLD VENIPUNCTURE: CPT | Performed by: PATHOLOGY

## 2022-10-28 PROCEDURE — 86162 COMPLEMENT TOTAL (CH50): CPT | Mod: 90 | Performed by: PATHOLOGY

## 2022-10-28 PROCEDURE — 86160 COMPLEMENT ANTIGEN: CPT | Mod: 90 | Performed by: PATHOLOGY

## 2022-10-28 PROCEDURE — 84450 TRANSFERASE (AST) (SGOT): CPT | Performed by: PATHOLOGY

## 2022-10-28 PROCEDURE — 85652 RBC SED RATE AUTOMATED: CPT | Performed by: PATHOLOGY

## 2022-10-28 PROCEDURE — 86225 DNA ANTIBODY NATIVE: CPT | Mod: 90 | Performed by: PATHOLOGY

## 2022-10-28 PROCEDURE — 82565 ASSAY OF CREATININE: CPT | Performed by: PATHOLOGY

## 2022-10-28 PROCEDURE — 99214 OFFICE O/P EST MOD 30 MIN: CPT | Mod: GC | Performed by: INTERNAL MEDICINE

## 2022-10-28 PROCEDURE — 82306 VITAMIN D 25 HYDROXY: CPT | Mod: 90 | Performed by: PATHOLOGY

## 2022-10-28 PROCEDURE — 86140 C-REACTIVE PROTEIN: CPT | Performed by: PATHOLOGY

## 2022-10-28 PROCEDURE — 84550 ASSAY OF BLOOD/URIC ACID: CPT | Mod: 90 | Performed by: PATHOLOGY

## 2022-10-28 PROCEDURE — 84460 ALANINE AMINO (ALT) (SGPT): CPT | Performed by: PATHOLOGY

## 2022-10-28 PROCEDURE — 99000 SPECIMEN HANDLING OFFICE-LAB: CPT | Performed by: PATHOLOGY

## 2022-10-28 PROCEDURE — G0463 HOSPITAL OUTPT CLINIC VISIT: HCPCS

## 2022-10-28 RX ORDER — APREMILAST 30 MG/1
30 TABLET, FILM COATED ORAL 2 TIMES DAILY
Qty: 180 TABLET | Refills: 1 | Status: ON HOLD | OUTPATIENT
Start: 2022-10-28 | End: 2023-02-12

## 2022-10-28 ASSESSMENT — PAIN SCALES - GENERAL: PAINLEVEL: NO PAIN (0)

## 2022-10-28 NOTE — TELEPHONE ENCOUNTER
Called and LVM for Courtney Valdez can make an appointment with Dr. Heaton as a new pt     Angella Reynolds Communication Facilitator on 10/28/2022 at 1:05 PM'

## 2022-10-28 NOTE — TELEPHONE ENCOUNTER
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 Pt last saw Dr. Heaton on 5/31/18.    Action Taken: Message routed to:  [facility] EYE    Travel Screening: Not Applicable

## 2022-10-28 NOTE — TELEPHONE ENCOUNTER
Home/cell [phone]    Left message at 1700    Reviewed may call [phone] option 1 for information to page on call eye provider to review any acute vision changes/symptoms    Reviewed may call triage line next week to review scheduling options and provided direct number    Calixto Bauman RN 5:01 PM 10/28/22

## 2022-10-28 NOTE — NURSING NOTE
"Chief Complaint   Patient presents with     RECHECK     Follow-up     /83   Pulse 82   Ht 1.6 m (5' 2.99\")   Wt 72.1 kg (158 lb 14.4 oz)   SpO2 97%   BMI 28.16 kg/m      Cristy Carty on 10/28/2022 at 11:09 AM    "

## 2022-10-28 NOTE — PROGRESS NOTES
Rheumatology Clinic Return Visit Patient     Samara Oropeza MRN# [id number]   YOB: 1995 Age: 27 year old     Date of Visit: 10/28/2022  Primary care provider: Clinic, [facility]          Assessment & Plan    Assessment & Plan   Samara is a 27 year old  female (ADRIAN 22) with Behçet's disease (pathergy, oral/genital ulcers, polyarthralgia) who presents today for RECHECK (Follow-up)  .    # Behçet's disease (pathergy, arthralgias, recurrent oral / genital ulcers)  # High-risk pregnancy (ADRIAN 22)  Mrs. Oropeza arrives for follow-up of her Behçet's disease that is more active at present with recent decrease of her Otezla (60mg --> 30mg) recommended by [facility]. Previous discussion with her OB providers had been to decrease to the lowest effective dose, clearly 30mg is not effective as she describes worsened oral ulcers, genital ulcers, arthralgias, abdominal pain, and thrombophlebitis. Although there is not a great deal of evidence for Otezla safety in pregnancy, its mechanism of action would suggest it. Counterbalancing these concerns are what we understand of Behçet's in pregnancy which more often improves but in some cases (~29%) becomes worse, and can flare more often in the 1st trimester and post- period.  She also describes some vision changes, which in the context of Behçet's is conerning. She has not seen Ophthalmology in many years; I will place a referral today.     -- INCREASE to otezla 30mg BID  -- Ophthalmology referral  -- RTC 3 months via video    -- LABS: below  Orders Placed This Encounter   Procedures     ALT     Albumin level     AST     Creatinine     Complement C4     Complement C3     CRP inflammation     DNA double stranded antibodies     Erythrocyte sedimentation rate auto     UA with Microscopic reflex to Culture     Protein  random urine     Creatinine random urine     Vitamin D Deficiency     Complement Activity Total (CH50)     Uric acid     "  The patient was seen and discussed with the attending physician, Dr. Dominga Sosa.     Harpreet \"BJ\" MD Tian, PhD  PGY-3 Rheumatology Fellow  [id number]      Attending Note: I saw and evaluated the patient with Dr. Overton. I agree with the assessment and plan.    Dominga Sosa MD              Medical History   History of Present Illness   Samara Oropeza is a 27 year old female who presents for follow-up of her Behçet's in the context of pregnancy.      Seen Dr. Marilyn Moran Rheumatology in [facility] [place] 10/14:    Original presentation 2014:     Ms Oropeza is a 19 y.o. female who presents with one year of presentation of painful oral ulcers (monthly) once that have worsened with two episodes in the last two months that presented with painful vulvar or vaginal ulcers (2 episodes so far every month) [not sure if they leave scar] as well that timing coincides with menses [place] [place]   ORAL ULCERS: last two episodes were severe, painful, white base with erythematous halo, that appear and disappear in the matter of 1-2 weeks without, does not bleed and doesn't respond to any treatment used (magic mouthwash), 10-15 in number with the biggest one being dime size.      VAGINAL ULCERS: 2-3 in number between labia majora and minora that occur simultaneously with oral ulcers, painful, non bleeding ulcers that are erythematous, no pus.      FOLLICULITIS: patient reports having spots in legs specially around ankle and knee, but arms, and neck are affected as well. Small lesions that resemble ingrown hair, most are red erythematous elevated lesions, well demarcated, some with liquid that patient refers as pimple like.      SKIN RASHES: welts and red macules with well defined borders that are pruritic and non-ulcerative on chest, arms and back. Benadryl relieves.      ARTHRALGIAS: In descending order of severity: hips, knees, wrists, shoulders, neck, lumbar, fingers.   C/o morning stiffness in hips, back and neck lasting for " about until noon.      Ms. Oropeza reports they present in severe flares and never fully resolve, but do get better. She has seen some swelling and warmth on the affected joints but has not noticed and redness. Has tried Tylenol, ibuprofen, and hydrocodone with not much benefit.      FEVERS: Reports 3-4 sporadic episodes per month of self limited fevers of 38 C that occur during the evenings and associate facial flushing.      HEADACHES: occur daily and are bitemporal and irradiate occipitally, pulsatile in nature, intensity varies from intense to mild, are worsened with movement. On treatment with ibuprofen and somatriptan without any positive results.      VISION CHANGES: Patient reports that suddenly she would only see a gray blotch in her right eyes and would persist like this for a day and a half. Also reports blurriness in her left eye. Presence of pain and burning sensation of eyeball with associated redness. Has changed prescription glasses in the matter of 2 years 3 times. She has had opthalmology exam and was not suggestive of any evidence of uveitis.   She was also evaluated in ED for a dizzy spell.      ABD PAIN W/ INTERMITTENT DIARRHEA AND CONSTIPATION: Patient reports abdominal pain that is intermittent, periods of 7 days without bowel movement and feeling bloated with change of pattern to diarrhea (liquidy without blood nor mucus, non foul smelling). Has taken Bentyl, Zegrid, and rinetidine with mild clinical improvement.  She also reports small red nodules after each needle stick for blood draw.   Neurology saw her back then and was not impressed with her presentation as physical examination was normal. Also MRI brain normal: Findings: No definite hemorrhage, mass affect, midline shift, or ventriculomegaly is noted.There are a few scattered non specific white matter T2 hyperintensities. Axial diffusion weighted images are unremarkable.     The major vascular structures appear patent. There is  opacification and severe mucosal thickening in the right maxillary sinus. The remaining paranasal sinuses, and mastoid air cells are clear. Orbits are unremarkable  She has + HSV-1 IGG. Seen ID. Negative for Herpes simplex virus culture. HSV IGM I/II COMBINATION SENT TO [facility]  RESULTS = <0.91  REF RANGE: <0.91 = NEGATIVE  ID did not think HSV could explain her mouth and genital sores.      CRP within normal limits. HIV negative. CBC within normal limits; UA negative. Creatinine within normal limits   CYNDIE neg.  Antigliadin neg.   ANti TTG neg.   [facility] 2014: Colonoscopy and endoscopy neg; result not available. Not sure if biopsies were done.   Raynaud's phenomenon:  Onset: about 2013  Digits: all fingers  Digital ulcers: no  Color changes: white ---> purple and red  Duration of an attack: About couple of hours per mom  Pain during attack: not clear pain but bruise like feeling  Frequency of attacks: few times a month  Family history of Raynauds: no  GERD: very long time     No hemoptysis.   No miscarriages/ no thrombosis in past.   No family or personal history of psoriasis, ulcerative colitis or chron's disease.   No buttock pain or low back pain or stiffness in AM     Interim history:  Dec 2014- Multiple mouth ulcers and did not eat for 5 days. This coincided with periods. Colchicine 0.6mg PO BID started in Nov 2014.   Prednisone taper in Dec 20mg to 0 in 4 weeks. She also used magic mouthwash. Kenalog cream. After going to the ER, 3 days later her ulcers were better. She thinks it improved after the menstruation stopped.   LMP 3 weeks ago.   COLCHICINE HAS HELPED A LOT REDUCING THE INTENSITY OF MENSTRUATION ASSOCIATED ORAL AND VULVAR ULCERS.      Interim history: 6/15     Since last visit only two episodes of mouth sores- small sized 6   No genital ulcers since last visit.   Colchicine 1.2 mg in AM and 0.6mg in PM keeps her symptoms under control.      Admitted in 5/15:  Admission Diagnoses:  - LUE weakness  -  "spell  - hx of migraines  - hx of Tourette syndrome  - hx of Behcet's disease     Discharge Diagnoses:   - spell likely 2/2 anxiety  - hx of migraines  - hx of Tourette syndrome  - hx of Behcet's disease     CT and MRI brain was normal.      Seeing Dr. Lilliana Miramontes soon as outpatient.      Dr. Garcia did not find any intraocular inflammation.       Interm 10/30/2015  ED for migraine. Continues to see neuro - MRI brain without lesions noted. Saw GI - upper barium normal. May be considering repeat colo. Worsening lesions in mouth and genitals. Has had continuous lesion in mouth x 2 months. 2 genital ulcers. Had fracture of right sesamoid in foot. Continues to have low grade fevers. New folliculitis on chest, legs and arms.      Interim hx: 1/11/2016    Pt states that since last visit she continues to have oral ulcers and has noted more folliculitis-like lesions on her lower extremities.  She states that on her right lower leg she had a red macular spot that has since resolved.  She denies uveitis.  She states that the colchicine initially helped but then stopped working.  She takes 0.6 mg TID.  She stopped taking her prednisone last week as she feels like this hasn't helped.  She hasn't had any episodes of vaginal ulcers.      She saw GI who stated she has gastroparesis.  She is seeing Cardiology later this week for possible syncopal episodes.       Interim history 5/16  Mouth ulcers X 1 last month  Genital ulcers - none since last visit.      Bilateral MCPs pain, knee pain and low back pain +ve increased since last visit  Morning stiffness X 2 hours (increasing)   5-6/10     \"overall myalgia\" has gotten worse    C/o pseudofolliculitis lesion on anterior shins bilaterally worse     Interim history: (7/18/2016)  Patient has just started Humira for 2 doses on 7/1 and 7/15 and reported minimal improvement of her hip pain but otherwise, did not notice anything different.      She reported painful mouth ulcer once a " month since last visit but noticed that it has been getting deeper.   Denied any genital ulcers.     Still has ongoing bilateral MCPs pain, knee pain but this has been intermittent and she did not have any much pain today. She reported 2-3 hours morning stiffness of both hands and pain score of 6/10 at her knees and 8/10 of her hands but currently takes no pain medication except prn ativan which she takes when her muscle is really tight.      The only concern is that she gained weight even though she has not been eating much (eat once a day) and do exercises every day for 1 hour (with video of yoga, pilates). Her mother wonders if she needs to have some labs work up include sex hormone, thyroid, cortisol.     Interval history 9/14/16  Patient was started Humira at the last visit, patient reports worsening of skin ulcers since starting Humira and stopped taking Humura for the past 2 weeks. Patient reports oral and genital ulcers has been stable even before starting Humira and has not had any interval oral/genital ulcers. However she reports recurrent skin ulcers occurring on a daily basis that affects her chest and anterior legs. Patient also has stopped taking prednisone, she reports that she doesn't feel the prednisone helps, her last dose of prednisone was 6+ months ago. Patient also report worsening low back pain that sometimes causes her to limp.     In the interval patient also visited ED on 8/31/16 for left hand pain and what the patient describes as phlebitis, no medication or procedure was done and the patient was discharged with a splint. Patient also reported 1 episode of chest pressure that was associated with dyspnea and numbness of the left arm, she was shopping in a supermarket at the time of onset, and the pressure resolved after she took a nap.     Patient denies any infectious symptoms in the interval, no fever, chills, night sweat, cough, dysuria, denies eye pain or visual changes.     November 4,  2016  Oct - had one genital ulcer  Oral ulcers X 5- around menstruation time.   Knee pain- chronic on the left side  Dizziness spells - on and off; been to the ER for that in the past.   On and off migraines  July 2013 - s/p MPFL reconstruction plus LRL 7/2013  Morning stiffness in knee (left) X 1 hour     Severe jaw pain and chest pain- patient wondering if this is Tourette's or esophageal spasms. Cardiac workup negative.   Fever      January 13, 2017  Yesterday in ER [facility] [place] with orthostatic syncope from dehydration.   Chest pain on and off still continues. Painful with deep breaths.   Oral ulcers - few minor ones lasting for one week;   One major one in roof of mouth lasting for about one week.   Knee pain +ve - reviewed the recent MRI with her orthopedic surgeon.   On and off migraines  otezla is approved. Still waiting to receive the meds.      March 31, 2017  Otezla current dose 15mg PO BID.   She is tolerating okay at this dose and is willing to increase the dose further up to 30mg BID.   Her joint pains are better on otezla. Knee pain is also better.   Back and neck pain +ve 8/10 when it is worse. Intramuscular botox injections were given on Monday in PMR.   2 minor genital ulcers in the interim- resolved.   Few oral ulcers in the interim- resolved.   Nasal ulcer - resolved.   Migraines on and off.   Nausea with otezla but improving.   Dizziness and blackouts on and off. She fell once and hurt her ankle. Wearing boot in left leg.   Complete ROS negative except for above     May 17, 2017  Tolerating otezla better. Not throwing up anymore. Current dose 20mg in AM and 30mg in PM (2nd week).   Patient thinks that her oral ulcers frequency and severity are improving with otezla. Also no genital ulcers in the interim.   Currently on doxycycline for bronchitis/?pneunomia. 4 more days left.      Joint pain history  2 weeks ago/ dx with pneumonia 1 week ago/  Involved joints: all over  Pain scale: 6.5/10   Wakes the  patient from sleep : Yes  Morning stiffness: Yes for 120 minutes  Meds used:otezla,colchicine     Interim history  Since last visit:  1. Infections - Yes/ pneumonia  2. New symptoms/medical problem - Yes/ thinks she may have had a mini-seizure about 10 days ago ; did not seek medical attention; did not reoccur after that. Patient seeing PCP today.  3. Any side effects from Rheum medications -vomiting a lot (resolved)  3. ER visits/Hospitalizations/surgeries - Yes  4. Last PCP visit: has an apt. today     Patient still getting double vision. Floaters present.      Therapist recommended psychiatry evaluation. Patient does not want to see psychaitry. Patient will see PCP today.      August 22, 2017  Have you ever seen a rheumatologist Yes Who You When 5/17/17     NO genital ulcers in the interim.   Mouth ulcers- three in the back of the throat and roof of the mouth last month.      Joint pain history  Onset: doing alittle worse / cut her otezla back to 30mg  Daily due to GI problems  Involved joints: all over her body. Been having more black out episodes, been having more chest pains.also has raised bumps on both legs from the knees down that itch and are painful   Pain scale:  5.5/10     Wakes the patient from sleep : Yes  Morning stiffness:Yes for 120 minutes  Meds used:otezla, colchicine (three pills daily)     Interim history  Since last visit:  1. Infections - Yes stomach issue  2. New symptoms/medical problem - No  3. Any side effects from Rheum medications -nausea from otezla  3. ER visits/Hospitalizations/surgeries - Yes, ER for foot, ankle injury from passing out and fell down the steps. Hit her head another time from passing out. Alcohol poisoning in July 4. Last PCP visit: 6/23/17 September 19, 2017  Have you ever seen a rheumatologist Yes Who You When 8/22/17  Joint pain history  Onset: pt states that she hurts everywhere for 6 days  Involved joints: all joints  Pain scale:  7/10     Wakes the patient  from sleep : Yes/ not sleeping at all  Morning stiffness:Yes for 180 minutes  Meds used:colchicine, otezla, magic mouth wash, lidocaine gel  Last one week: increased joint pain; and genital ulcer  Genital lesion (dimed size) in the last one week  After botox a week ago, she had fever 101 for three days; near syncopes. Back pain; floaters  Chest pain , mouth sores, hand pain, morning pain were all better with otezla 30mg twice daily.     Interim history  Since last visit:  1. Infections - No  2. New symptoms/medical problem - No  3. Any side effects from Rheum medications -nausea from the otezla  3. ER visits/Hospitalizations/surgeries - No  4. Last PCP visit: may 2017      October 18, 2017     Have you ever seen a rheumatologist Yes Who You When 9/19/17  Joint pain history  NO mouth or genital sores in the last 4 weeks.   Medrol dosepak helped with visual symptoms but not joint pains.      Onset: pt states that she is doing better than last time with her behcet's. Today has severe stomach pains, states that she is constipated. Pt had a seizure 2 days ago. Does have a metallic taste in her mouth  Involved joints: hands , neck, shoulders  Pain scale:  9/10 from stomach pain;      Wakes the patient from sleep : Yes  Morning stiffness:Yes for 120 minutes  Meds used:cochicine , otezla 30mg twice daily     Interim history  Since last visit:  1. Infections - No  2. New symptoms/medical problem - Yes/ the cartilage in her chest is inflamed  3. Any side effects from Rheum medications -nausea from the otezla  3. ER visits/Hospitalizations/surgeries - No  4. Last PCP visit: yesterday     January 16, 2018  Have you ever seen a rheumatologist Yes Who You When 10/18/17  Joint pain history  Onset: pt states that she was in the hospital for 5 days before maribell, they told her she had lesions in her brain in the white matter. Had blood work drawn in the ER and they told her her inflammatory markers were elevated. Was seen in the  ER last week for vomiting and diarrhea, not eating. Saw Gastro. On 1/10/18. 1.5 weeks ago she had an endoscopy and colonoscopy. She has been having elbow  And hands and knee pain, loosing use of her hands. Been dropping things. Also states that she has been getting lesions up her nose  Involved joints: see above  Pain scale:  8/10     Wakes the patient from sleep : Yes  Morning stiffness:Yes for 120 minutes  Meds used:colchisine, otezla, magic mouth wash, kenolog cream, lidocaine gel     Interim history  Since last visit:  1. Infections - Yes/ had an infection in her jaw. Having sinus issues  2. New symptoms/medical problem - Yes/ states that she is having problems walking, states that she was bouncing when she was walking  3. Any side effects from Rheum medications -otezla, nausea  3. ER visits/Hospitalizations/surgeries - Yes/ see chart  4. Last PCP visit: November 2017 April 17, 2018  Have you ever seen a rheumatologist Yes Who You When 1/16/18  Joint pain history  Onset: pt states that she is not doing well. She is having increased stomach issues, only eats 2 times in 4 days unable to keep the otezla down due to vomiting . Has sleep study done in 1 week; no genital ulcers since last appointment. 6 small mouth sores since last appointment.   Involved joints: see above   Pain scale:  7/10     Wakes the patient from sleep : Yes  Morning stiffness:Yes for 60 minutes  Meds used:otezla , colchicine, diclofenac gel, magic mouthwash, lidocaine gel      Interim history  Since last visit:  1. Infections - Yes/ had a sinus infection, thinks she is getting sick now  2. New symptoms/medical problem - Yes/ neurology sent pt to cardiology. Has a heart condition but not sure what . She is seeing a ortho. Due to knee pain   3. Any side effects from Rheum medications -nausea/vomiting from the otezla   3. ER visits/Hospitalizations/surgeries - Yes/ seen in ER   4. Last PCP visit: yesterday     July 17, 2018  Have you ever seen  a rheumatologist Yes Who You When 4/17/18  Joint pain history  Onset: pt Is here for a follow-up states that she started to get lesions on her legs , face and arm. Hurts all over, lower back is very painful. Right hand and wrist area are numb  Involved joints: see above  Pain scale:  7/10   worse in the morning  Wakes the patient from sleep : Yes/ does not go to sleep till late  Morning stiffness:Yes for 4-5 hours minutes  Meds used:colchicine, otezla has  Not started otezla yet due to extreme nausea      Interim history  Since last visit:  1. Infections - Yes/ had a bacterial infection in her pelvic area was hospitalized  2. New symptoms/medical problem - Yes/ hands are swelling up. Having orthopedic issues. Was having problem with her veins sticking up, and very painful. Has happened multiply times   3. Any side effects from Rheum medications -see above  3. ER visits/Hospitalizations/surgeries - Yes/ hospital and ER for bacterial infection  4. 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 ER said may have blood clot in calf, but when did MRI, stated body may have broken it up on it's own. Elevated D-dimer  Involved joints: Knees, neck, hands  Pain scale:  6.5/10     Wakes the patient from sleep : Yes  Morning stiffness:Yes for 180 minutes  Meds used:hyoscyamine, not taking otezla. Last dose Sep. Patient did not want to take otezla with the antibiotics.      Last major mouth ulcer- aug or Sep  No genital ulcers in the last 6 months.      Interim history  Since last visit:  1. Infections - Yes, UTI's twice a month since last visit  2. New symptoms/medical problem - No  3. Any side effects from Rheum medications -otzela causes nausea  3. ER visits/Hospitalizations/surgeries - Yes, 1/2/19 repaired some ligaments and mass taken out, also joint build up removed  from a previous injury  4. Last PCP visit: 12/5/19 June 12, 2019  Have you ever seen a rheumatologist yes Who you When 1/9/19  Joint pain history  Onset: Patient is here for a follow up. A lot of infections and in and out of the hospital, who wanted her to follow up with Rheumatology again.  Involved joints: knees, legs, back, neck, shoulders, ankles  Pain scale:  6.5/10     Wakes the patient from sleep : Yes  Morning stiffness:Yes for 120 minutes  Meds used:magic mouthwash, colchicine     Interim history  Since last visit:  1. Infections - Yes, staph  2. New symptoms/medical problem - kidney failure  3. Any side effects from Rheum medications -none  3. ER visits/Hospitalizations/surgeries - Yes, 3 hospitalizations, and surgeries,  4. Last PCP visit: 6/11/19        Today 9/30/2020: New to me, establishing care. Flaring off otezla.     816   Reports worsening oral ulcers and joint pain and skin rash.     No vaginal ulcers currently. Last ones were 5 months ago.     Gets oral ulcers monthly, not today, had them last few days ago. They come up as flare up around period time. They self-resolve.     Thinks colcrys is helping but not enough, lost some benefit. No longer has diarrhea from it. the dose is 0.6 mg bid.     Came off otezla last year when she had severe staff infection, there was drug drug interaction with vancomycin. Wants to go back on it.     Skin lesions over chest are clearing up. Has skin lesions over her legs.     She is off of otezla >1 yr. She had daily vomiting and abdominal cramps initially which later resolved after 2 months.      Today, is her last day liz her health insurnaace  .     Reports pain and swelling liz hands. Drops things, lost strenghth. Veins look inflamed. Hands are swollen in AM and before bedtime. AM stiffness is 2 hours. can t tolerate ibuprofen.     Prednnisonne does not help much.     Wants to try eleve.     Had 2 blood clots over L arm, finished xraalto 4 months ago.       Was told to have severe dry eyes, hs not had eyes checked> 1 yr as was in the hospital with staff infection. systanee nd gentle eyedrops help some, sometimes gets sharp pain and and blurry vision in her eyes from dryness. No h/o uveitis.     has dry mouth, drinks water.     Gets T  F sometimes. It is sporadic.     Lost hair.     Gets intermittent CP. Had several hospital visits and admissions in the [facility] for it. lorazepam helped witch chest spasms, she does not like pain meds.     She was prescribed 0.5 mgq d prn, 10 tbs/monthss.     She gets dry cough, just started using albuterol inhaler, it helps.     No SOB.     Has gastroparesis, IBS  nd h/o severas admission for constiption, colon blockage with impaction and gets bloating. has lost follow up with GI.     She is working with  to get medical assistance to get insurance back by early next year. She filed it again.     Gets botox inj every 3 months nd they help, has to cancel 10/20 botox inj s will not have insurance. they came back yesterday.     last seizure waas last year. still gets black out spells, salts ne was last week.    derm eye gi     FHx: Both parents have RA.  SHx: She was working in a [employer] shop, it was closed because of pandemic. sexually active, not on oral contraceptives. She thinks she had a misccraaaiaage last wk, had n period x2 months then mueller bleeding a lot, dark red and was different from period. Felt something came out that she feels she miscarried, no pos pregnancy test. No smoking. rarely drinks ETOH.  [facility]   thumbs between MCP tender   otezla helped with skin lesions, oral ulcers, joint pain.  colcrys  sjogre aleve biotene voltaren [id number]        8/11/2022: Samara presents for urgent visit to discuss m/o Behcet's flare during pregnancy, she is   She is pregnant 10 wk 3 days with ADRIAN 3/6/2023.  In 2020, had 1st trimester miscarriage.  Has LBP, ankle pain/swelling.   When she found  out to be pregnant, stopped otezla and colcrys but started to flare with Behcet's. Discussed with MFM, back on low dose otezla since last week, only 1 tab a day.    Interval History  10/28/2022  Mrs. Oropeza was last seen 08/11/22. At that time she was advised to continue otezla (1 tablet BID) and remain off colcrys. Since that time, she has instead taken otezla 1 tablet daily. She notes more joint pain in hands, wrists, cervical spine, knees with 4 hours of monring stiffness. Previously <1hr with full strength.     Had a few genital ulcers recently which only lasted a few days. Oral ulcers have been more active of late and are currently healing.     Abdominal pain on L side, pain is always worse after eating. Out of the phase of her pregnancy with morning sickness, at 22w on Monday.     She describes a few fevers at the end of September with hot flashes and cold sweats with Tmax 100. Called OB-GYN who instructed she should go in if persistent, though it resolved within a few days.     More easy bruising - thighs, she is currently on lovenox and baby aspirin. Previous history of clots especially with interventions. She recently had an issue with superficial thrombophlebitis following a blood draw.     Seeing more floaters in her vision. Sometimes tiny and clear, but can also be larger and 'gray', up to 1/4 of her vision. Has not seen Ophthalmology in many years.     More frequent migraines - did a nerve block recently but this has worn off, previous botox injections.     ADRIAN 03/06/22.     Review of Systems    Associated symptoms include arthralgia, fatigue, morning stiffness, nausea and oral ulcers.   Denies associated muscle weakness, new headache, nodules, palpitations, pleurisy, polyuria and rashes/photosensitive.      Past Medical History   Past Medical History:   Diagnosis Date     Anemia      Anxiety      Arthritis      Behcet's disease (H)      Cervical adenitis May 2010     Chronic abdominal pain       Constipation, chronic 1994     Fibromyalgia      Gastro-oesophageal reflux disease      Gastroparesis      Irregular heart beat     tachycardia, has had workup     Migraines      Neuromuscular disorder (H)     fibramyalgia     Palpitations      PONV (postoperative nausea and vomiting)      Seizure (H)      Seizures (H)     unknown etiology     Syncope      Tourette's       Past Surgical History:   Procedure Laterality Date     ARTHROSCOPY ANKLE, OPEN REPAIR LIGAMENT, COMBINED Left 9/25/2019    Procedure: LEFT ANKLE ARTHROSCOPY WITH LIGAMENT REPAIR;  Surgeon: Andres Johnson DPM;  Location: [facility]     ARTHROSCOPY ANKLE, REPAIR LIGAMENT Left 1/2/2019    Procedure: Ankle arthroscopy and sinus tarsi evacuation, ligament repair, left lower extremity;  Surgeon: Andres Johnson DPM;  Location: [facility] OR     ARTHROSCOPY KNEE WITH PATELLAR REALIGNMENT  7/25/2013    Procedure: ARTHROSCOPY KNEE WITH PATELLAR REALIGNMENT;  Left Knee Arthroscopy, Medial Patellofemoral Ligament Reconstruction with Allograft  ;  Surgeon: Jennifer Acevedo MD;  Location: [facility]     COLONOSCOPY  2015     DENTAL SURGERY  1996    Teeth removal     ENDOSCOPY UPPER, COLONOSCOPY, COMBINED  2005     HC ESOPH/GAS REFLUX TEST W NASAL IMPED >1 HR N/A 2/15/2017    Procedure: ESOPHAGEAL IMPEDENCE FUNCTION TEST WITH 24 HOUR PH GREATER THAN 1 HOUR;  Surgeon: Timothy Matta MD;  Location: [facility]     IR PICC PLACEMENT > 5 YRS OF AGE  3/13/2019     IR UPPER EXTREMITY VENOGRAM LEFT  2/25/2020     IRRIGATION AND DEBRIDEMENT FOOT, COMBINED Left 3/12/2019    Procedure: COMBINED IRRIGATION AND DEBRIDEMENT LEFT ANKLE;  Surgeon: Micha Glover MD;  Location: [facility]     IRRIGATION AND DEBRIDEMENT LOWER EXTREMITY, COMBINED Left 5/7/2019    Procedure: 1.  Excision of wound down to and including deep fascia, less than 20 cm2.  2.  Irrigation and debridement, left ankle.;  Surgeon: Andres Johnson DPM;  Location: [facility]     PICC INSERTION Left  05/05/2019    4Fr - 45cm (5cm external), Basilic vein, SVC RA junction      Patient Active Problem List    Diagnosis Date Noted     Infection of joint of ankle (H) 05/06/2019     Priority: Medium     Cellulitis 03/09/2019     Priority: Medium     Displacement of lumbar intervertebral disc without myelopathy 11/13/2018     Priority: Medium     Overview:   Created by Conversion       Iron deficiency associated with nonfamilial restless legs syndrome 11/13/2018     Priority: Medium     Enthesopathy of hip region 11/13/2018     Priority: Medium     Overview:   Created by Conversion       Tourette's syndrome 11/13/2018     Priority: Medium     Overview:   Created by Conversion       Somatic symptom disorder 06/01/2018     Priority: Medium     Pelvic floor weakness 04/25/2018     Priority: Medium     Spells of decreased attentiveness 12/19/2017     Priority: Medium     Mobile cecum 11/09/2017     Priority: Medium     Cecum noted in Right lower quadrant on 4/17 CT scan, and in Left upper Quadrant on CT on 11/2017.       Vitamin D deficiency 10/11/2017     Priority: Medium     How low, unknown/not found. On D when tested at 27. Starting cholecalciferol October 2017. Needs recheck.       Convulsions, unspecified convulsion type (H) 10/03/2017     Priority: Medium     Transient alteration of awareness 10/03/2017     Priority: Medium     Chronic pain syndrome 07/27/2017     Priority: Medium     Major depressive disorder, recurrent episode, moderate (H) 06/27/2017     Priority: Medium     Cervical pain 05/02/2017     Priority: Medium     Acute left ankle pain 03/31/2017     Priority: Medium     Cervical dystonia 03/28/2017     Priority: Medium     PTSD (post-traumatic stress disorder) 01/17/2017     Priority: Medium     Patellofemoral instability 10/20/2016     Priority: Medium     Fibromyalgia 08/04/2016     Priority: Medium     Rheumatoid arthritis of multiple sites without rheumatoid factor (H) 08/04/2016     Priority:  Medium     Raynaud's disease without gangrene 08/04/2016     Priority: Medium     Chronic abdominal pain 08/04/2016     Priority: Medium     Palpitations 01/12/2016     Priority: Medium     On colchicine therapy 10/30/2015     Priority: Medium     Spell of shaking 05/06/2015     Priority: Medium     Migraine 02/04/2015     Priority: Medium     Behcet's disease (H) 12/10/2014     Priority: Medium     Headaches due to old head injury 11/12/2013     Priority: Medium     Milk protein intolerance 10/11/2013     Priority: Medium     Intestinal malabsorption 10/11/2013     Priority: Medium     Concussion 02/13/2013     Priority: Medium     Jan 2013, with prolonged recovery- followed by sports med         Knee pain 01/03/2013     Priority: Medium     Generalized anxiety disorder 06/25/2009     Priority: Medium     Tics - Tourette syndrome 05/18/2009     Priority: Medium     Followed by psychotherapy. Symptoms well managed. Originally diagnosed at [facility] neurology. (Dr. Simpson)           IBS (irritable bowel syndrome) 05/18/2009     Priority: Medium     Allergic rhinitis 05/18/2009     Priority: Medium     GERD (gastroesophageal reflux disease) 01/10/2008     Priority: Medium     Gastroparesis 1994     Priority: Medium      Family History   Problem Relation Age of Onset     Depression Mother      Neurologic Disorder Mother         Migraines, take imitrex injection.  Also in maternal grandmother.       Alcohol/Drug Father      Hypertension Father      Depression Father      Osteoarthritis Father      Cardiovascular Maternal Grandmother      Depression Maternal Grandmother      Hypertension Maternal Grandmother      Alzheimer Disease Maternal Grandmother      Cardiovascular Maternal Grandfather      Hypertension Maternal Grandfather      Depression Maternal Grandfather      Alcohol/Drug Maternal Grandfather      Diabetes Maternal Grandfather      Cardiovascular Paternal Grandmother      Hypertension Paternal Grandmother       Cardiovascular Paternal Grandfather      Hypertension Paternal Grandfather      Glaucoma No family hx of      Macular Degeneration No family hx of       Allergies   Allergen Reactions     Amoxil [Penicillins] Rash     Dad unsure of reaction.     Bee Venom Anaphylaxis     Bioflavonoids Anaphylaxis     Citrus Anaphylaxis     All [place]     Contrast Dye Rash     [place] [facility] [place] Contrast [facility] [place].  NOTE: this is a contrast media oral with iodine. Premedicate with methylpred standard for IV contrast, request barium contrast for oral contrast.     Diagnostic X-Ray Materials Hives and Rash     Contrast [employer] [place].; Contrast [employer] [place].  NOTE: this is a contrast media oral with iodine. Premedicate with methylpred standard for IV contrast, request barium contrast for oral contrast.     Pineapple Anaphylaxis, Difficulty breathing and Rash     Reglan [Metoclopramide] Other (See Comments)     IV dose only, in ER, rapid heart rate.     Ace Inhibitors      Difficulty in breathing and GI upset     Amitiza [Lubiprostone] Nausea and Vomiting     Amoxicillin-Pot Clavulanate      Midazolam Unknown     parent states that when pt takes this medication, she wakes up being very violent .     Other [No Clinical Screening - See Comments]      Bleech/ chest tightness, itchy throat, swollen tongue, hives     Tizanidine Other (See Comments)     Confusion, back pain, photophobia, abdominal pain, shaking, anxious       Versed      Coming out of pelvic exam at age of 5, was kicking and screaming when coming out of the versed.     Adhesive Tape Rash     Azithromycin Hives and Rash     Cephalexin Itching and Rash     Sulfa Drugs Rash     Skin scarring      Current Outpatient Medications   Medication Sig Dispense Refill     albuterol (PROAIR HFA/PROVENTIL HFA/VENTOLIN HFA) 108 (90 Base) MCG/ACT inhaler Inhale 2 puffs into the lungs every 6 hours as  needed for shortness of breath / dyspnea or wheezing 1 Inhaler 1     amitriptyline (ELAVIL) 25 MG tablet TAKE 1 TABLET BY MOUTH EVERY NIGHT AT BEDTIME 90 tablet 1     Apremilast (OTEZLA) 10 & 20 & 30 MG TBPK Take by mouth according to the instructions on the packet. Hold for signs of infection,any GI upset, weight loss or depression concerns, and seek medical attention. 1 each 0     apremilast (OTEZLA) 30 MG tablet Take 1 tablet (30 mg) by mouth 2 times daily HOLD FOR SIGNS OF INFECTION, AND SEEK MEDICAL ATTENTION. 180 tablet 1     artificial tears OINT ophthalmic ointment 0.5 inch strip each eye at night 1 Tube 11     aspirin (ASA) 81 MG chewable tablet        benzocaine (TOPICALE XTRA) 20 % GEL Apply as needed locally to mouth or nasal ulcers for pain; 4 times daily as needed 30 g 1     betamethasone valerate (VALISONE) 0.1 % cream Apply topically 2 times daily as needed        bisacodyl (DULCOLAX) 5 MG EC tablet Take 2 tablets (10 mg) by mouth daily as needed for constipation 30 tablet 0     citalopram (CELEXA) 20 MG tablet Take 1 tablet (20 mg) by mouth daily 90 tablet 1     dexamethasone (DECADRON) 4 MG/ML injection To be used by therapist during PT sessions. 30 mL 0     diclofenac (VOLTAREN) 75 MG EC tablet Take 1 tablet (75 mg) by mouth 2 times daily as needed for moderate pain 60 tablet 1     dicyclomine (BENTYL) 20 MG tablet Take 1 tablet (20 mg) by mouth 4 times daily as needed 120 tablet 3     diphenhydrAMINE (BENADRYL) 25 MG tablet Take 1 tablet (25 mg) by mouth 3 times daily as needed (itching) 40 tablet 1     enoxaparin ANTICOAGULANT (LOVENOX) 40 MG/0.4ML syringe        EPINEPHrine (EPIPEN 2-EAMON) 0.3 MG/0.3ML injection Inject 0.3 mLs (0.3 mg) into the muscle as needed for anaphylaxis 0.6 mL 3     furosemide (LASIX) 40 MG tablet TAKE ONE TABLET BY MOUTH TWICE DAILY  60 tablet 0     gabapentin (NEURONTIN) 300 MG capsule TAKE ONE CAPSULE BY MOUTH THREE TIMES DAILY  90 capsule 0     hydrOXYzine (ATARAX) 25  "MG tablet TAKE ONE OR TWO TABLETS BY MOUTH EVERY SIX HOURS AS NEEDED       hypromellose (GENTEAL) 0.3 % SOLN 1 drop every hour as needed for dry eyes        lactulose 20 GM/30ML SOLN Take 30 mLs by mouth 3 times daily as needed (for constipation) 300 mL 3     lidocaine (LMX4) 4 % external cream Apply topically once as needed for mild pain 120 g 1     LINZESS 290 MCG capsule TAKE 1 CAPSULE BY MOUTH EVERY DAY IN THE MORNING BEFORE BREAKFAST 90 capsule 0     LORazepam (ATIVAN) 0.5 MG tablet TAKE 1 TABLET BY MOUTH EVERY 6 HOURS AS NEEDED FOR ANXIETY 10 tablet 0     ondansetron (ZOFRAN-ODT) 8 MG ODT tab Take 1 tablet (8 mg) by mouth every 8 hours as needed for nausea 180 tablet 1     order for DME Equipment being ordered: Trilok brace 8 1/2 left lower extremity 1 Device 0     order for DME Equipment being ordered: size 8 1/2 walking boot tall 1 Device 0     polyethylene glycol (MIRALAX/GLYCOLAX) powder Take 1 capful by mouth 3 times daily       rizatriptan (MAXALT) 5 MG tablet Take 1 tablet (5 mg) by mouth at onset of headache for migraine May repeat in 2 hours. Max 6 tablets/24 hours. 10 tablet 1     sodium fluoride 1.1 % CREA Apply 1 Application topically daily       sucralfate (CARAFATE) 1 GM/10ML suspension Take 10 mLs (1 g) by mouth 4 times daily 1200 mL 2     triamcinolone (KENALOG) 0.1 % external ointment Apply twice daily as needed to lesions on the genitals and body. OK to use very sparingly on the face. 454 g 2     triamcinolone (KENALOG) 0.1 % paste Take by mouth 2 times daily 5 g 5     triamcinolone (KENALOG) 0.5 % external ointment Apply 1 g topically 3 times daily as needed for irritation 15 g 3           Physical Exam   Physical Exam   Blood pressure 128/83, pulse 82, height 1.6 m (5' 2.99\"), weight 72.1 kg (158 lb 14.4 oz), SpO2 97 %, not currently breastfeeding.  Wt Readings from Last 4 Encounters:   10/28/22 72.1 kg (158 lb 14.4 oz)   05/24/22 63.5 kg (140 lb)   10/11/21 73 kg (161 lb)   06/15/21 73.4 " kg (161 lb 12.8 oz)   Physical Exam  Constitutional:       Appearance: Normal appearance.   Cardiovascular:      Rate and Rhythm: Normal rate and regular rhythm.   Pulmonary:      Effort: Pulmonary effort is normal.      Breath sounds: Normal breath sounds.   Musculoskeletal:         General: Normal range of motion.      Cervical back: Normal range of motion and neck supple. No tenderness.   Skin:     General: Skin is warm and dry.      Findings: Bruising (mild, small, scattered on legs) present. No rash.   Neurological:      Mental Status: She is alert.           Joint Exam 10/28/2022        Right  Left   Wrist   Tender   Tender          Data   Data      10/28/2022   BEAUCHAMP-28 (ESR) --   BEAUCHAMP-28 (CRP) --   Tender (BEAUCHAMP-28) 2 / 28    Swollen (BEAUCHAMP-28) 0 / 28    Provider Global --   Patient Global --   ESR 17 mm/hr   CRP --     Results for orders placed or performed in visit on 10/28/22   ALT     Status: Normal   Result Value Ref Range    ALT 16 10 - 35 U/L   Albumin level     Status: Normal   Result Value Ref Range    Albumin 4.1 3.5 - 5.2 g/dL   AST     Status: Normal   Result Value Ref Range    AST 22 10 - 35 U/L   Creatinine     Status: Normal   Result Value Ref Range    Creatinine 0.59 0.51 - 0.95 mg/dL    GFR Estimate >90 >60 mL/min/1.73m2   CRP inflammation     Status: Normal   Result Value Ref Range    CRP Inflammation <3.00 <5.00 mg/L   Erythrocyte sedimentation rate auto     Status: Normal   Result Value Ref Range    Erythrocyte Sedimentation Rate 17 0 - 20 mm/hr   Uric acid     Status: Normal   Result Value Ref Range    Uric Acid 3.0 2.4 - 5.7 mg/dL     CBC RESULTS: Recent Labs   Lab Test 09/07/20  1147   WBC 4.1   RBC 5.09   HGB 13.0   HCT 43.0   MCV 85   MCH 25.5*   MCHC 30.2*   RDW 15.0        TSH   Date Value Ref Range Status   09/07/2020 0.77 0.40 - 4.00 mU/L Final   03/21/2019 0.53 0.40 - 4.00 mU/L Final   10/30/2018 1.06 0.40 - 4.00 mU/L Final   11/26/2016 0.87 0.40 - 4.00 mU/L Final     T4 Free    Date Value Ref Range Status   01/31/2007 1.26 0.70 - 1.85 ng/dL Final     Rheumatoid Factor   Date Value Ref Range Status   09/30/2020 <7 <12 IU/mL Final   05/06/2016 <20 <20 IU/mL Final       Reviewed Rheumatology lab flowsheet

## 2022-10-28 NOTE — LETTER
10/28/2022     RE: Samara Oropeza  [address]  [address]  [facility] [place] [id number]     Dear Colleague,    Thank you for referring your patient, Samara Oropeza, to the [facility] RHEUMATOLOGY [facility]INIC [place] [facility]. Please see a copy of my visit note below.    Rheumatology Clinic Return Visit Patient     Samara Oropeza MRN# [id number]   YOB: 1995 Age: 27 year old     Date of Visit: 10/28/2022  Primary care provider: [facility]          Assessment & Plan    Assessment & Plan   Samara is a 27 year old  female (ADRIAN 22) with Behçet's disease (pathergy, oral/genital ulcers, polyarthralgia) who presents today for RECHECK (Follow-up)  .    # Behçet's disease (pathergy, arthralgias, recurrent oral / genital ulcers)  # High-risk pregnancy (ADRIAN 22)  Mrs. Oropeza arrives for follow-up of her Behçet's disease that is more active at present with recent decrease of her Otezla (60mg --> 30mg) recommended by [facility]. Previous discussion with her OB providers had been to decrease to the lowest effective dose, clearly 30mg is not effective as she describes worsened oral ulcers, genital ulcers, arthralgias, abdominal pain, and thrombophlebitis. Although there is not a great deal of evidence for Otezla safety in pregnancy, its mechanism of action would suggest it. Counterbalancing these concerns are what we understand of Behçet's in pregnancy which more often improves but in some cases (~29%) becomes worse, and can flare more often in the 1st trimester and post- period.  She also describes some vision changes, which in the context of Behçet's is conerning. She has not seen Ophthalmology in many years; I will place a referral today.     -- INCREASE to otezla 30mg BID  -- Ophthalmology referral  -- RTC 3 months via video    -- LABS: below  Orders Placed This Encounter   Procedures      "ALT     Albumin level     AST     Creatinine     Complement C4     Complement C3     CRP inflammation     DNA double stranded antibodies     Erythrocyte sedimentation rate auto     UA with Microscopic reflex to Culture     Protein  random urine     Creatinine random urine     Vitamin D Deficiency     Complement Activity Total (CH50)     Uric acid      The patient was seen and discussed with the attending physician, Dr. Dominga Sosa.     Harpreet \"BJ\" MD Tian, PhD  PGY-3 Rheumatology Fellow  [id number]      Attending Note: I saw and evaluated the patient with Dr. Overton. I agree with the assessment and plan.    Dominga Sosa MD             Medical History   History of Present Illness   Samara Oropeza is a 27 year old female who presents for follow-up of her Behçet's in the context of pregnancy.      Seen Dr. Marilyn Moran Rheumatology in [facility] [place]:    Original presentation 2014:     Ms Oropeza is a 19 y.o. female who presents with one year of presentation of painful oral ulcers (monthly) once that have worsened with two episodes in the last two months that presented with painful vulvar or vaginal ulcers (2 episodes so far every month) [not sure if they leave scar] as well that timing coincides with menses ([facility] [place]: 8/25/14).   ORAL ULCERS: last two episodes were severe, painful, white base with erythematous halo, that appear and disappear in the matter of 1-2 weeks without, does not bleed and doesn't respond to any treatment used (magic mouthwash), 10-15 in number with the biggest one being dime size.      VAGINAL ULCERS: 2-3 in number between labia majora and minora that occur simultaneously with oral ulcers, painful, non bleeding ulcers that are erythematous, no pus.      FOLLICULITIS: patient reports having spots in legs specially around ankle and knee, but arms, and neck are affected as well. Small lesions that resemble ingrown hair, most are red erythematous elevated lesions, well demarcated, some with liquid " that patient refers as pimple like.      SKIN RASHES: welts and red macules with well defined borders that are pruritic and non-ulcerative on chest, arms and back. Benadryl relieves.      ARTHRALGIAS: In descending order of severity: hips, knees, wrists, shoulders, neck, lumbar, fingers.   C/o morning stiffness in hips, back and neck lasting for about until noon.      Ms. Oropeza reports they present in severe flares and never fully resolve, but do get better. She has seen some swelling and warmth on the affected joints but has not noticed and redness. Has tried Tylenol, ibuprofen, and hydrocodone with not much benefit.      FEVERS: Reports 3-4 sporadic episodes per month of self limited fevers of 38 C that occur during the evenings and associate facial flushing.      HEADACHES: occur daily and are bitemporal and irradiate occipitally, pulsatile in nature, intensity varies from intense to mild, are worsened with movement. On treatment with ibuprofen and somatriptan without any positive results.      VISION CHANGES: Patient reports that suddenly she would only see a gray blotch in her right eyes and would persist like this for a day and a half. Also reports blurriness in her left eye. Presence of pain and burning sensation of eyeball with associated redness. Has changed prescription glasses in the matter of 2 years 3 times. She has had opthalmology exam and was not suggestive of any evidence of uveitis.   She was also evaluated in ED for a dizzy spell.      ABD PAIN W/ INTERMITTENT DIARRHEA AND CONSTIPATION: Patient reports abdominal pain that is intermittent, periods of 7 days without bowel movement and feeling bloated with change of pattern to diarrhea (liquidy without blood nor mucus, non foul smelling). Has taken Bentyl, Zegrid, and rinetidine with mild clinical improvement.  She also reports small red nodules after each needle stick for blood draw.   Neurology saw her back then and was not impressed with her  presentation as physical examination was normal. Also MRI brain normal: Findings: No definite hemorrhage, mass affect, midline shift, or ventriculomegaly is noted.There are a few scattered non specific white matter T2 hyperintensities. Axial diffusion weighted images are unremarkable.     The major vascular structures appear patent. There is opacification and severe mucosal thickening in the right maxillary sinus. The remaining paranasal sinuses, and mastoid air cells are clear. Orbits are unremarkable  She has + HSV-1 IGG. Seen ID. Negative for Herpes simplex virus culture. HSV IGM I/II COMBINATION SENT TO [facility]  RESULTS = <0.91  REF RANGE: <0.91 = NEGATIVE  ID did not think HSV could explain her mouth and genital sores.      CRP within normal limits. HIV negative. CBC within normal limits; UA negative. Creatinine within normal limits   CYNDIE neg.  Antigliadin neg.   ANti TTG neg.   [facility] 2014: Colonoscopy and endoscopy neg; result not available. Not sure if biopsies were done.   Raynaud's phenomenon:  Onset: about 2013  Digits: all fingers  Digital ulcers: no  Color changes: white ---> purple and red  Duration of an attack: About couple of hours per mom  Pain during attack: not clear pain but bruise like feeling  Frequency of attacks: few times a month  Family history of Raynauds: no  GERD: very long time     No hemoptysis.   No miscarriages/ no thrombosis in past.   No family or personal history of psoriasis, ulcerative colitis or chron's disease.   No buttock pain or low back pain or stiffness in AM     Interim history:  Dec 2014- Multiple mouth ulcers and did not eat for 5 days. This coincided with periods. Colchicine 0.6mg PO BID started in Nov 2014.   Prednisone taper in Dec 20mg to 0 in 4 weeks. She also used magic mouthwash. Kenalog cream. After going to the ER, 3 days later her ulcers were better. She thinks it improved after the menstruation stopped.   LMP 3 weeks ago.   COLCHICINE HAS HELPED A LOT  REDUCING THE INTENSITY OF MENSTRUATION ASSOCIATED ORAL AND VULVAR ULCERS.      Interim history: 6/15     Since last visit only two episodes of mouth sores- small sized 6   No genital ulcers since last visit.   Colchicine 1.2 mg in AM and 0.6mg in PM keeps her symptoms under control.      Admitted in 5/15:  Admission Diagnoses:  - LUE weakness  - spell  - hx of migraines  - hx of Tourette syndrome  - hx of Behcet's disease     Discharge Diagnoses:   - spell likely 2/2 anxiety  - hx of migraines  - hx of Tourette syndrome  - hx of Behcet's disease     CT and MRI brain was normal.      Seeing Dr. Lilliana Miramontes soon as outpatient.      Dr. Garcia did not find any intraocular inflammation.       Interm 10/30/2015  ED for migraine. Continues to see neuro - MRI brain without lesions noted. Saw GI - upper barium normal. May be considering repeat colo. Worsening lesions in mouth and genitals. Has had continuous lesion in mouth x 2 months. 2 genital ulcers. Had fracture of right sesamoid in foot. Continues to have low grade fevers. New folliculitis on chest, legs and arms.      Interim hx: 1/11/2016    Pt states that since last visit she continues to have oral ulcers and has noted more folliculitis-like lesions on her lower extremities.  She states that on her right lower leg she had a red macular spot that has since resolved.  She denies uveitis.  She states that the colchicine initially helped but then stopped working.  She takes 0.6 mg TID.  She stopped taking her prednisone last week as she feels like this hasn't helped.  She hasn't had any episodes of vaginal ulcers.      She saw GI who stated she has gastroparesis.  She is seeing Cardiology later this week for possible syncopal episodes.       Interim history 5/16  Mouth ulcers X 1 last month  Genital ulcers - none since last visit.      Bilateral MCPs pain, knee pain and low back pain +ve increased since last visit  Morning stiffness X 2 hours (increasing)  "  5-6/10     \"overall myalgia\" has gotten worse    C/o pseudofolliculitis lesion on anterior shins bilaterally worse     Interim history: (7/18/2016)  Patient has just started Humira for 2 doses on 7/1 and 7/15 and reported minimal improvement of her hip pain but otherwise, did not notice anything different.      She reported painful mouth ulcer once a month since last visit but noticed that it has been getting deeper.   Denied any genital ulcers.     Still has ongoing bilateral MCPs pain, knee pain but this has been intermittent and she did not have any much pain today. She reported 2-3 hours morning stiffness of both hands and pain score of 6/10 at her knees and 8/10 of her hands but currently takes no pain medication except prn ativan which she takes when her muscle is really tight.      The only concern is that she gained weight even though she has not been eating much (eat once a day) and do exercises every day for 1 hour (with video of yoga, pilates). Her mother wonders if she needs to have some labs work up include sex hormone, thyroid, cortisol.     Interval history 9/14/16  Patient was started Humira at the last visit, patient reports worsening of skin ulcers since starting Humira and stopped taking Humura for the past 2 weeks. Patient reports oral and genital ulcers has been stable even before starting Humira and has not had any interval oral/genital ulcers. However she reports recurrent skin ulcers occurring on a daily basis that affects her chest and anterior legs. Patient also has stopped taking prednisone, she reports that she doesn't feel the prednisone helps, her last dose of prednisone was 6+ months ago. Patient also report worsening low back pain that sometimes causes her to limp.     In the interval patient also visited ED on 8/31/16 for left hand pain and what the patient describes as phlebitis, no medication or procedure was done and the patient was discharged with a splint. Patient also " reported 1 episode of chest pressure that was associated with dyspnea and numbness of the left arm, she was shopping in a supermarket at the time of onset, and the pressure resolved after she took a nap.     Patient denies any infectious symptoms in the interval, no fever, chills, night sweat, cough, dysuria, denies eye pain or visual changes.     November 4, 2016  Oct - had one genital ulcer  Oral ulcers X 5- around menstruation time.   Knee pain- chronic on the left side  Dizziness spells - on and off; been to the ER for that in the past.   On and off migraines  July 2013 - s/p MPFL reconstruction plus LRL 7/2013  Morning stiffness in knee (left) X 1 hour     Severe jaw pain and chest pain- patient wondering if this is Tourette's or esophageal spasms. Cardiac workup negative.   Fever      January 13, 2017  Yesterday in ER [facility] [place] with orthostatic syncope from dehydration.   Chest pain on and off still continues. Painful with deep breaths.   Oral ulcers - few minor ones lasting for one week;   One major one in roof of mouth lasting for about one week.   Knee pain +ve - reviewed the recent MRI with her orthopedic surgeon.   On and off migraines  otezla is approved. Still waiting to receive the meds.      March 31, 2017  Otezla current dose 15mg PO BID.   She is tolerating okay at this dose and is willing to increase the dose further up to 30mg BID.   Her joint pains are better on otezla. Knee pain is also better.   Back and neck pain +ve 8/10 when it is worse. Intramuscular botox injections were given on Monday in PMR.   2 minor genital ulcers in the interim- resolved.   Few oral ulcers in the interim- resolved.   Nasal ulcer - resolved.   Migraines on and off.   Nausea with otezla but improving.   Dizziness and blackouts on and off. She fell once and hurt her ankle. Wearing boot in left leg.   Complete ROS negative except for above     May 17, 2017  Tolerating otezla better. Not throwing up anymore. Current dose 20mg  in AM and 30mg in PM (2nd week).   Patient thinks that her oral ulcers frequency and severity are improving with otezla. Also no genital ulcers in the interim.   Currently on doxycycline for bronchitis/?pneunomia. 4 more days left.      Joint pain history  2 weeks ago/ dx with pneumonia 1 week ago/  Involved joints: all over  Pain scale: 6.5/10   Wakes the patient from sleep : Yes  Morning stiffness: Yes for 120 minutes  Meds used:otezla,colchicine     Interim history  Since last visit:  1. Infections - Yes/ pneumonia  2. New symptoms/medical problem - Yes/ thinks she may have had a mini-seizure about 10 days ago ; did not seek medical attention; did not reoccur after that. Patient seeing PCP today.  3. Any side effects from Rheum medications -vomiting a lot (resolved)  3. ER visits/Hospitalizations/surgeries - Yes  4. Last PCP visit: has an apt. today     Patient still getting double vision. Floaters present.      Therapist recommended psychiatry evaluation. Patient does not want to see psychaitry. Patient will see PCP today.      August 22, 2017  Have you ever seen a rheumatologist Yes Who You When 5/17/17     NO genital ulcers in the interim.   Mouth ulcers- three in the back of the throat and roof of the mouth last month.      Joint pain history  Onset: doing alittle worse / cut her otezla back to 30mg  Daily due to GI problems  Involved joints: all over her body. Been having more black out episodes, been having more chest pains.also has raised bumps on both legs from the knees down that itch and are painful   Pain scale:  5.5/10     Wakes the patient from sleep : Yes  Morning stiffness:Yes for 120 minutes  Meds used:otezla, colchicine (three pills daily)     Interim history  Since last visit:  1. Infections - Yes stomach issue  2. New symptoms/medical problem - No  3. Any side effects from Rheum medications -nausea from otezla  3. ER visits/Hospitalizations/surgeries - Yes, ER for foot, ankle injury from  passing out and fell down the steps. Hit her head another time from passing out. Alcohol poisoning in July 4. Last PCP visit: 6/23/17 September 19, 2017  Have you ever seen a rheumatologist Yes Who You When 8/22/17  Joint pain history  Onset: pt states that she hurts everywhere for 6 days  Involved joints: all joints  Pain scale:  7/10     Wakes the patient from sleep : Yes/ not sleeping at all  Morning stiffness:Yes for 180 minutes  Meds used:colchicine, otezla, magic mouth wash, lidocaine gel  Last one week: increased joint pain; and genital ulcer  Genital lesion (dimed size) in the last one week  After botox a week ago, she had fever 101 for three days; near syncopes. Back pain; floaters  Chest pain , mouth sores, hand pain, morning pain were all better with otezla 30mg twice daily.     Interim history  Since last visit:  1. Infections - No  2. New symptoms/medical problem - No  3. Any side effects from Rheum medications -nausea from the otezla  3. ER visits/Hospitalizations/surgeries - No  4. Last PCP visit: may 2017      October 18, 2017     Have you ever seen a rheumatologist Yes Who You When 9/19/17  Joint pain history  NO mouth or genital sores in the last 4 weeks.   Medrol dosepak helped with visual symptoms but not joint pains.      Onset: pt states that she is doing better than last time with her behcet's. Today has severe stomach pains, states that she is constipated. Pt had a seizure 2 days ago. Does have a metallic taste in her mouth  Involved joints: hands , neck, shoulders  Pain scale:  9/10 from stomach pain;      Wakes the patient from sleep : Yes  Morning stiffness:Yes for 120 minutes  Meds used:cochicine , otezla 30mg twice daily     Interim history  Since last visit:  1. Infections - No  2. New symptoms/medical problem - Yes/ the cartilage in her chest is inflamed  3. Any side effects from Rheum medications -nausea from the otezla  3. ER visits/Hospitalizations/surgeries - No  4. Last PCP  visit: yesterday     January 16, 2018  Have you ever seen a rheumatologist Yes Who You When 10/18/17  Joint pain history  Onset: pt states that she was in the hospital for 5 days before maribell, they told her she had lesions in her brain in the white matter. Had blood work drawn in the ER and they told her her inflammatory markers were elevated. Was seen in the ER last week for vomiting and diarrhea, not eating. Saw Gastro. On 1/10/18. 1.5 weeks ago she had an endoscopy and colonoscopy. She has been having elbow  And hands and knee pain, loosing use of her hands. Been dropping things. Also states that she has been getting lesions up her nose  Involved joints: see above  Pain scale:  8/10     Wakes the patient from sleep : Yes  Morning stiffness:Yes for 120 minutes  Meds used:colchisine, otezla, magic mouth wash, kenolog cream, lidocaine gel     Interim history  Since last visit:  1. Infections - Yes/ had an infection in her jaw. Having sinus issues  2. New symptoms/medical problem - Yes/ states that she is having problems walking, states that she was bouncing when she was walking  3. Any side effects from Rheum medications -otezla, nausea  3. ER visits/Hospitalizations/surgeries - Yes/ see chart  4. Last PCP visit: November 2017 April 17, 2018  Have you ever seen a rheumatologist Yes Who You When 1/16/18  Joint pain history  Onset: pt states that she is not doing well. She is having increased stomach issues, only eats 2 times in 4 days unable to keep the otezla down due to vomiting . Has sleep study done in 1 week; no genital ulcers since last appointment. 6 small mouth sores since last appointment.   Involved joints: see above   Pain scale:  7/10     Wakes the patient from sleep : Yes  Morning stiffness:Yes for 60 minutes  Meds used:otezla , colchicine, diclofenac gel, magic mouthwash, lidocaine gel      Interim history  Since last visit:  1. Infections - Yes/ had a sinus infection, thinks she is getting sick  now  2. New symptoms/medical problem - Yes/ neurology sent pt to cardiology. Has a heart condition but not sure what . She is seeing a ortho. Due to knee pain   3. Any side effects from Rheum medications -nausea/vomiting from the otezla   3. ER visits/Hospitalizations/surgeries - Yes/ seen in ER   4. Last PCP visit: yesterday     July 17, 2018  Have you ever seen a rheumatologist Yes Who You When 4/17/18  Joint pain history  Onset: pt Is here for a follow-up states that she started to get lesions on her legs , face and arm. Hurts all over, lower back is very painful. Right hand and wrist area are numb  Involved joints: see above  Pain scale:  7/10   worse in the morning  Wakes the patient from sleep : Yes/ does not go to sleep till late  Morning stiffness:Yes for 4-5 hours minutes  Meds used:colchicine, otezla has  Not started otezla yet due to extreme nausea      Interim history  Since last visit:  1. Infections - Yes/ had a bacterial infection in her pelvic area was hospitalized  2. New symptoms/medical problem - Yes/ hands are swelling up. Having orthopedic issues. Was having problem with her veins sticking up, and very painful. Has happened multiply times   3. Any side effects from Rheum medications -see above  3. ER visits/Hospitalizations/surgeries - Yes/ hospital and ER for bacterial infection  4. 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 ER said may have blood clot in calf, but when did MRI, stated body may have broken it up on it's own. Elevated D-dimer  Involved joints: Knees, neck, hands  Pain scale:  6.5/10     Wakes the patient from sleep : Yes  Morning stiffness:Yes for 180 minutes  Meds used:hyoscyamine, not taking otezla. Last dose Sep. Patient did not want to take otezla with the antibiotics.      Last major mouth ulcer- aug or Sep  No  genital ulcers in the last 6 months.      Interim history  Since last visit:  1. Infections - Yes, UTI's twice a month since last visit  2. New symptoms/medical problem - No  3. Any side effects from Rheum medications -otzela causes nausea  3. ER visits/Hospitalizations/surgeries - Yes, 1/2/19 repaired some ligaments and mass taken out, also joint build up removed from a previous injury  4. Last PCP visit: 12/5/19 June 12, 2019  Have you ever seen a rheumatologist yes Who you When 1/9/19  Joint pain history  Onset: Patient is here for a follow up. A lot of infections and in and out of the hospital, who wanted her to follow up with Rheumatology again.  Involved joints: knees, legs, back, neck, shoulders, ankles  Pain scale:  6.5/10     Wakes the patient from sleep : Yes  Morning stiffness:Yes for 120 minutes  Meds used:magic mouthwash, colchicine     Interim history  Since last visit:  1. Infections - Yes, staph  2. New symptoms/medical problem - kidney failure  3. Any side effects from Rheum medications -none  3. ER visits/Hospitalizations/surgeries - Yes, 3 hospitalizations, and surgeries,  4. Last PCP visit: 6/11/19        Today 9/30/2020: New to me, establishing care. Flaring off otezla.     816   Reports worsening oral ulcers and joint pain and skin rash.     No vaginal ulcers currently. Last ones were 5 months ago.     Gets oral ulcers monthly, not today, had them last few days ago. They come up as flare up around period time. They self-resolve.     Thinks colcrys is helping but not enough, lost some benefit. No longer has diarrhea from it. the dose is 0.6 mg bid.     Came off otezla last year when she had severe staff infection, there was drug drug interaction with vancomycin. Wants to go back on it.     Skin lesions over chest are clearing up. Has skin lesions over her legs.     She is off of otezla >1 yr. She had daily vomiting and abdominal cramps initially which later resolved after 2 months.       Today, is her last day liz her health insurnaace  .     Reports pain and swelling liz hands. Drops things, lost strenghth. Veins look inflamed. Hands are swollen in AM and before bedtime. AM stiffness is 2 hours. can t tolerate ibuprofen.     Prednnisonne does not help much.     Wants to try eleve.     Had 2 blood clots over L arm, finished xraalto 4 months ago.      Was told to have severe dry eyes, hs not had eyes checked> 1 yr as was in the hospital with staff infection. systanee nd gentle eyedrops help some, sometimes gets sharp pain and and blurry vision in her eyes from dryness. No h/o uveitis.     has dry mouth, drinks water.     Gets T  F sometimes. It is sporadic.     Lost hair.     Gets intermittent CP. Had several hospital visits and admissions in the [facility] for it. lorazepam helped witch chest spasms, she does not like pain meds.     She was prescribed 0.5 mgq d prn, 10 tbs/monthss.     She gets dry cough, just started using albuterol inhaler, it helps.     No SOB.     Has gastroparesis, IBS  nd h/o severas admission for constiption, colon blockage with impaction and gets bloating. has lost follow up with GI.     She is working with  to get medical assistance to get insurance back by early next year. She filed it again.     Gets botox inj every 3 months nd they help, has to cancel 10/20 botox inj s will not have insurance. they came back yesterday.     last seizure waas last year. still gets black out spells, salts ne was last week.    derm eye gi     FHx: Both parents have RA.  SHx: She was working in a [employer] shop, it was closed because of pandemic. sexually active, not on oral contraceptives. She thinks she had a misccraaaiaage last wk, had n period x2 months then mueller bleeding a lot, dark red and was different from period. Felt something came out that she feels she miscarried, no pos pregnancy test. No smoking. rarely drinks ETOH.  [facility]   thumbs  between MCP tender   otezla helped with skin lesions, oral ulcers, joint pain.  colcrys  sjogre aleve devi tellon [phone]        8/11/2022: Samara presents for urgent visit to discuss m/o Behcet's flare during pregnancy, she is   She is pregnant 10 wk 3 days with ADRIAN 3/6/2023.  In 2020, had 1st trimester miscarriage.  Has LBP, ankle pain/swelling.   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 Discussed with MFM, back on low dose otezla since last week, only 1 tab a day.    Interval History  10/28/2022  Mrs. Oropeza was last seen 08/11/22. At that time she was advised to continue otezla (1 tablet BID) and remain off colcrys. Since that time, she has instead taken otezla 1 tablet daily. She notes more joint pain in hands, wrists, cervical spine, knees with 4 hours of monring stiffness. Previously <1hr with full strength.     Had a few genital ulcers recently which only lasted a few days. Oral ulcers have been more active of late and are currently healing.     Abdominal pain on L side, pain is always worse after eating. Out of the phase of her pregnancy with morning sickness, at 22w on Monday.     She describes a few fevers at the end of September with hot flashes and cold sweats with Tmax 100. Called OB-GYN who instructed she should go in if persistent, though it resolved within a few days.     More easy bruising - thighs, she is currently on lovenox and baby aspirin. Previous history of clots especially with interventions. She recently had an issue with superficial thrombophlebitis following a blood draw.     Seeing more floaters in her vision. Sometimes tiny and clear, but can also be larger and 'gray', up to 1/4 of her vision. Has not seen Ophthalmology in many years.     More frequent migraines - did a nerve block recently but this has worn off, previous botox injections.     ADRIAN 03/06/22.     Review of Systems    Associated symptoms include arthralgia,  fatigue, morning stiffness, nausea and oral ulcers.   Denies associated muscle weakness, new headache, nodules, palpitations, pleurisy, polyuria and rashes/photosensitive.      Past Medical History   Past Medical History:   Diagnosis Date     Anemia      Anxiety      Arthritis      Behcet's disease (H)      Cervical adenitis May 2010     Chronic abdominal pain      Constipation, chronic 1994     Fibromyalgia      Gastro-oesophageal reflux disease      Gastroparesis      Irregular heart beat     tachycardia, has had workup     Migraines      Neuromuscular disorder (H)     fibramyalgia     Palpitations      PONV (postoperative nausea and vomiting)      Seizure (H)      Seizures (H)     unknown etiology     Syncope      Tourette's       Past Surgical History:   Procedure Laterality Date     ARTHROSCOPY ANKLE, OPEN REPAIR LIGAMENT, COMBINED Left 9/25/2019    Procedure: LEFT ANKLE ARTHROSCOPY WITH LIGAMENT REPAIR;  Surgeon: Andres Johnson DPM;  Location:  OR     ARTHROSCOPY ANKLE, REPAIR LIGAMENT Left 1/2/2019    Procedure: Ankle arthroscopy and sinus tarsi evacuation, ligament repair, left lower extremity;  Surgeon: Andres Johnson DPM;  Location:  OR     ARTHROSCOPY KNEE WITH PATELLAR REALIGNMENT  7/25/2013    Procedure: ARTHROSCOPY KNEE WITH PATELLAR REALIGNMENT;  Left Knee Arthroscopy, Medial Patellofemoral Ligament Reconstruction with Allograft  ;  Surgeon: Jennifer Acevedo MD;  Location: [facility]     COLONOSCOPY  2015     DENTAL SURGERY  1996    Teeth removal     ENDOSCOPY UPPER, COLONOSCOPY, COMBINED  2005     HC ESOPH/GAS REFLUX TEST W NASAL IMPED >1 HR N/A 2/15/2017    Procedure: ESOPHAGEAL IMPEDENCE FUNCTION TEST WITH 24 HOUR PH GREATER THAN 1 HOUR;  Surgeon: Timothy Matta MD;  Location: [facility]     IR PICC PLACEMENT > 5 YRS OF AGE  3/13/2019     IR UPPER EXTREMITY VENOGRAM LEFT  2/25/2020     IRRIGATION AND DEBRIDEMENT FOOT, COMBINED Left 3/12/2019    Procedure: COMBINED IRRIGATION AND  DEBRIDEMENT LEFT ANKLE;  Surgeon: Micha Glover MD;  Location: [facility]     IRRIGATION AND DEBRIDEMENT LOWER EXTREMITY, COMBINED Left 5/7/2019    Procedure: 1.  Excision of wound down to and including deep fascia, less than 20 cm2.  2.  Irrigation and debridement, left ankle.;  Surgeon: Andres Johnson DPM;  Location: [facility]     PICC INSERTION Left 05/05/2019    4Fr - 45cm (5cm external), Basilic vein, SVC RA junction      Patient Active Problem List    Diagnosis Date Noted     Infection of joint of ankle (H) 05/06/2019     Priority: Medium     Cellulitis 03/09/2019     Priority: Medium     Displacement of lumbar intervertebral disc without myelopathy 11/13/2018     Priority: Medium     Overview:   Created by Conversion       Iron deficiency associated with nonfamilial restless legs syndrome 11/13/2018     Priority: Medium     Enthesopathy of hip region 11/13/2018     Priority: Medium     Overview:   Created by Conversion       Tourette's syndrome 11/13/2018     Priority: Medium     Overview:   Created by Conversion       Somatic symptom disorder 06/01/2018     Priority: Medium     Pelvic floor weakness 04/25/2018     Priority: Medium     Spells of decreased attentiveness 12/19/2017     Priority: Medium     Mobile cecum 11/09/2017     Priority: Medium     Cecum noted in Right lower quadrant on 4/17 CT scan, and in Left upper Quadrant on CT on 11/2017.       Vitamin D deficiency 10/11/2017     Priority: Medium     How low, unknown/not found. On D when tested at 27. Starting cholecalciferol October 2017. Needs recheck.       Convulsions, unspecified convulsion type (H) 10/03/2017     Priority: Medium     Transient alteration of awareness 10/03/2017     Priority: Medium     Chronic pain syndrome 07/27/2017     Priority: Medium     Major depressive disorder, recurrent episode, moderate (H) 06/27/2017     Priority: Medium     Cervical pain 05/02/2017     Priority: Medium     Acute left ankle pain  03/31/2017     Priority: Medium     Cervical dystonia 03/28/2017     Priority: Medium     PTSD (post-traumatic stress disorder) 01/17/2017     Priority: Medium     Patellofemoral instability 10/20/2016     Priority: Medium     Fibromyalgia 08/04/2016     Priority: Medium     Rheumatoid arthritis of multiple sites without rheumatoid factor (H) 08/04/2016     Priority: Medium     Raynaud's disease without gangrene 08/04/2016     Priority: Medium     Chronic abdominal pain 08/04/2016     Priority: Medium     Palpitations 01/12/2016     Priority: Medium     On colchicine therapy 10/30/2015     Priority: Medium     Spell of shaking 05/06/2015     Priority: Medium     Migraine 02/04/2015     Priority: Medium     Behcet's disease (H) 12/10/2014     Priority: Medium     Headaches due to old head injury 11/12/2013     Priority: Medium     Milk protein intolerance 10/11/2013     Priority: Medium     Intestinal malabsorption 10/11/2013     Priority: Medium     Concussion 02/13/2013     Priority: Medium     Jan 2013, with prolonged recovery- followed by sports med         Knee pain 01/03/2013     Priority: Medium     Generalized anxiety disorder 06/25/2009     Priority: Medium     Tics - Tourette syndrome 05/18/2009     Priority: Medium     Followed by psychotherapy. Symptoms well managed. Originally diagnosed at [facility]. (Dr. Simpson)           IBS (irritable bowel syndrome) 05/18/2009     Priority: Medium     Allergic rhinitis 05/18/2009     Priority: Medium     GERD (gastroesophageal reflux disease) 01/10/2008     Priority: Medium     Gastroparesis 1994     Priority: Medium      Family History   Problem Relation Age of Onset     Depression Mother      Neurologic Disorder Mother         Migraines, take imitrex injection.  Also in maternal grandmother.       Alcohol/Drug Father      Hypertension Father      Depression Father      Osteoarthritis Father      Cardiovascular Maternal Grandmother      Depression  Maternal Grandmother      Hypertension Maternal Grandmother      Alzheimer Disease Maternal Grandmother      Cardiovascular Maternal Grandfather      Hypertension Maternal Grandfather      Depression Maternal Grandfather      Alcohol/Drug Maternal Grandfather      Diabetes Maternal Grandfather      Cardiovascular Paternal Grandmother      Hypertension Paternal Grandmother      Cardiovascular Paternal Grandfather      Hypertension Paternal Grandfather      Glaucoma No family hx of      Macular Degeneration No family hx of       Allergies   Allergen Reactions     Amoxil [Penicillins] Rash     Dad unsure of reaction.     Bee Venom Anaphylaxis     Bioflavonoids Anaphylaxis     Citrus Anaphylaxis     All [place]     Contrast Dye Rash     Contrast Media Ready-Box [facility], 04/09/2014.; Contrast Media Ready-Box [facility], 04/09/2014.  NOTE: this is a contrast media oral with iodine. Premedicate with methylpred standard for IV contrast, request barium contrast for oral contrast.     Diagnostic X-Ray Materials Hives and Rash     Contrast Media Ready-Box [facility], 04/09/2014.; Contrast Media Ready-Box [facility], 04/09/2014.  NOTE: this is a contrast media oral with iodine. Premedicate with methylpred standard for IV contrast, request barium contrast for oral contrast.     Pineapple Anaphylaxis, Difficulty breathing and Rash     Reglan [Metoclopramide] Other (See Comments)     IV dose only, in ER, rapid heart rate.     Ace Inhibitors      Difficulty in breathing and GI upset     Amitiza [Lubiprostone] Nausea and Vomiting     Amoxicillin-Pot Clavulanate      Midazolam Unknown     parent states that when pt takes this medication, she wakes up being very violent .     Other [No Clinical Screening - See Comments]      Bleech/ chest tightness, itchy throat, swollen tongue, hives     Tizanidine Other (See Comments)     Confusion, back pain, photophobia, abdominal pain, shaking, anxious       Versed      Coming out of pelvic exam at age of 5, was  kicking and screaming when coming out of the versed.     Adhesive Tape Rash     Azithromycin Hives and Rash     Cephalexin Itching and Rash     Sulfa Drugs Rash     Skin scarring      Current Outpatient Medications   Medication Sig Dispense Refill     albuterol (PROAIR HFA/PROVENTIL HFA/VENTOLIN HFA) 108 (90 Base) MCG/ACT inhaler Inhale 2 puffs into the lungs every 6 hours as needed for shortness of breath / dyspnea or wheezing 1 Inhaler 1     amitriptyline (ELAVIL) 25 MG tablet TAKE 1 TABLET BY MOUTH EVERY NIGHT AT BEDTIME 90 tablet 1     Apremilast (OTEZLA) 10 & 20 & 30 MG TBPK Take by mouth according to the instructions on the packet. Hold for signs of infection,any GI upset, weight loss or depression concerns, and seek medical attention. 1 each 0     apremilast (OTEZLA) 30 MG tablet Take 1 tablet (30 mg) by mouth 2 times daily HOLD FOR SIGNS OF INFECTION, AND SEEK MEDICAL ATTENTION. 180 tablet 1     artificial tears OINT ophthalmic ointment 0.5 inch strip each eye at night 1 Tube 11     aspirin (ASA) 81 MG chewable tablet        benzocaine (TOPICALE XTRA) 20 % GEL Apply as needed locally to mouth or nasal ulcers for pain; 4 times daily as needed 30 g 1     betamethasone valerate (VALISONE) 0.1 % cream Apply topically 2 times daily as needed        bisacodyl (DULCOLAX) 5 MG EC tablet Take 2 tablets (10 mg) by mouth daily as needed for constipation 30 tablet 0     citalopram (CELEXA) 20 MG tablet Take 1 tablet (20 mg) by mouth daily 90 tablet 1     dexamethasone (DECADRON) 4 MG/ML injection To be used by therapist during PT sessions. 30 mL 0     diclofenac (VOLTAREN) 75 MG EC tablet Take 1 tablet (75 mg) by mouth 2 times daily as needed for moderate pain 60 tablet 1     dicyclomine (BENTYL) 20 MG tablet Take 1 tablet (20 mg) by mouth 4 times daily as needed 120 tablet 3     diphenhydrAMINE (BENADRYL) 25 MG tablet Take 1 tablet (25 mg) by mouth 3 times daily as needed (itching) 40 tablet 1     enoxaparin  ANTICOAGULANT (LOVENOX) 40 MG/0.4ML syringe        EPINEPHrine (EPIPEN 2-EAMON) 0.3 MG/0.3ML injection Inject 0.3 mLs (0.3 mg) into the muscle as needed for anaphylaxis 0.6 mL 3     furosemide (LASIX) 40 MG tablet TAKE ONE TABLET BY MOUTH TWICE DAILY  60 tablet 0     gabapentin (NEURONTIN) 300 MG capsule TAKE ONE CAPSULE BY MOUTH THREE TIMES DAILY  90 capsule 0     hydrOXYzine (ATARAX) 25 MG tablet TAKE ONE OR TWO TABLETS BY MOUTH EVERY SIX HOURS AS NEEDED       hypromellose (GENTEAL) 0.3 % SOLN 1 drop every hour as needed for dry eyes        lactulose 20 GM/30ML SOLN Take 30 mLs by mouth 3 times daily as needed (for constipation) 300 mL 3     lidocaine (LMX4) 4 % external cream Apply topically once as needed for mild pain 120 g 1     LINZESS 290 MCG capsule TAKE 1 CAPSULE BY MOUTH EVERY DAY IN THE MORNING BEFORE BREAKFAST 90 capsule 0     LORazepam (ATIVAN) 0.5 MG tablet TAKE 1 TABLET BY MOUTH EVERY 6 HOURS AS NEEDED FOR ANXIETY 10 tablet 0     ondansetron (ZOFRAN-ODT) 8 MG ODT tab Take 1 tablet (8 mg) by mouth every 8 hours as needed for nausea 180 tablet 1     order for DME Equipment being ordered: Trilok brace 8 1/2 left lower extremity 1 Device 0     order for DME Equipment being ordered: size 8 1/2 walking boot tall 1 Device 0     polyethylene glycol (MIRALAX/GLYCOLAX) powder Take 1 capful by mouth 3 times daily       rizatriptan (MAXALT) 5 MG tablet Take 1 tablet (5 mg) by mouth at onset of headache for migraine May repeat in 2 hours. Max 6 tablets/24 hours. 10 tablet 1     sodium fluoride 1.1 % CREA Apply 1 Application topically daily       sucralfate (CARAFATE) 1 GM/10ML suspension Take 10 mLs (1 g) by mouth 4 times daily 1200 mL 2     triamcinolone (KENALOG) 0.1 % external ointment Apply twice daily as needed to lesions on the genitals and body. OK to use very sparingly on the face. 454 g 2     triamcinolone (KENALOG) 0.1 % paste Take by mouth 2 times daily 5 g 5     triamcinolone (KENALOG) 0.5 % external  "ointment Apply 1 g topically 3 times daily as needed for irritation 15 g 3           Physical Exam   Physical Exam   Blood pressure 128/83, pulse 82, height 1.6 m (5' 2.99\"), weight 72.1 kg (158 lb 14.4 oz), SpO2 97 %, not currently breastfeeding.  Wt Readings from Last 4 Encounters:   10/28/22 72.1 kg (158 lb 14.4 oz)   05/24/22 63.5 kg (140 lb)   10/11/21 73 kg (161 lb)   06/15/21 73.4 kg (161 lb 12.8 oz)   Physical Exam  Constitutional:       Appearance: Normal appearance.   Cardiovascular:      Rate and Rhythm: Normal rate and regular rhythm.   Pulmonary:      Effort: Pulmonary effort is normal.      Breath sounds: Normal breath sounds.   Musculoskeletal:         General: Normal range of motion.      Cervical back: Normal range of motion and neck supple. No tenderness.   Skin:     General: Skin is warm and dry.      Findings: Bruising (mild, small, scattered on legs) present. No rash.   Neurological:      Mental Status: She is alert.           Joint Exam 10/28/2022        Right  Left   Wrist   Tender   Tender          Data   Data      10/28/2022   BEAUCHAMP-28 (ESR) --   BEAUCHAMP-28 (CRP) --   Tender (BEAUCHAMP-28) 2 / 28    Swollen (BEAUCHAMP-28) 0 / 28    Provider Global --   Patient Global --   ESR 17 mm/hr   CRP --     Results for orders placed or performed in visit on 10/28/22   ALT     Status: Normal   Result Value Ref Range    ALT 16 10 - 35 U/L   Albumin level     Status: Normal   Result Value Ref Range    Albumin 4.1 3.5 - 5.2 g/dL   AST     Status: Normal   Result Value Ref Range    AST 22 10 - 35 U/L   Creatinine     Status: Normal   Result Value Ref Range    Creatinine 0.59 0.51 - 0.95 mg/dL    GFR Estimate >90 >60 mL/min/1.73m2   CRP inflammation     Status: Normal   Result Value Ref Range    CRP Inflammation <3.00 <5.00 mg/L   Erythrocyte sedimentation rate auto     Status: Normal   Result Value Ref Range    Erythrocyte Sedimentation Rate 17 0 - 20 mm/hr   Uric acid     Status: Normal   Result Value Ref Range    Uric " Acid 3.0 2.4 - 5.7 mg/dL     CBC RESULTS: Recent Labs   Lab Test 09/07/20  1147   WBC 4.1   RBC 5.09   HGB 13.0   HCT 43.0   MCV 85   MCH 25.5*   MCHC 30.2*   RDW 15.0        TSH   Date Value Ref Range Status   09/07/2020 0.77 0.40 - 4.00 mU/L Final   03/21/2019 0.53 0.40 - 4.00 mU/L Final   10/30/2018 1.06 0.40 - 4.00 mU/L Final   11/26/2016 0.87 0.40 - 4.00 mU/L Final     T4 Free   Date Value Ref Range Status   01/31/2007 1.26 0.70 - 1.85 ng/dL Final     Rheumatoid Factor   Date Value Ref Range Status   09/30/2020 <7 <12 IU/mL Final   05/06/2016 <20 <20 IU/mL Final       Reviewed Rheumatology lab flowsheet

## 2022-10-30 LAB — CH50 SERPL-ACNC: 68 U/ML

## 2022-10-30 ASSESSMENT — JOINT PAIN
TOTAL NUMBER OF TENDER JOINTS: 2
TOTAL NUMBER OF SWOLLEN JOINTS: 0

## 2022-10-31 LAB
C3 SERPL-MCNC: 139 MG/DL (ref 81–157)
C4 SERPL-MCNC: 19 MG/DL (ref 13–39)
DEPRECATED CALCIDIOL+CALCIFEROL SERPL-MC: 34 UG/L (ref 20–75)
DSDNA AB SER-ACNC: 4.7 IU/ML

## 2022-11-29 DIAGNOSIS — G43.719 CHRONIC MIGRAINE WITHOUT AURA, INTRACTABLE, WITHOUT STATUS MIGRAINOSUS: Primary | ICD-10-CM

## 2022-12-06 ENCOUNTER — OFFICE VISIT (OUTPATIENT)
Dept: PHYSICAL MEDICINE AND REHAB | Facility: CLINIC | Age: 27
End: 2022-12-06
Payer: COMMERCIAL

## 2022-12-06 VITALS — SYSTOLIC BLOOD PRESSURE: 115 MMHG | HEART RATE: 112 BPM | DIASTOLIC BLOOD PRESSURE: 72 MMHG

## 2022-12-06 DIAGNOSIS — M54.81 BILATERAL OCCIPITAL NEURALGIA: ICD-10-CM

## 2022-12-06 DIAGNOSIS — G43.719 CHRONIC MIGRAINE WITHOUT AURA, INTRACTABLE, WITHOUT STATUS MIGRAINOSUS: Primary | ICD-10-CM

## 2022-12-06 PROCEDURE — 64615 CHEMODENERV MUSC MIGRAINE: CPT | Performed by: PHYSICAL MEDICINE & REHABILITATION

## 2022-12-06 PROCEDURE — 64405 NJX AA&/STRD GR OCPL NRV: CPT | Mod: XS | Performed by: PHYSICAL MEDICINE & REHABILITATION

## 2022-12-06 PROCEDURE — 95874 GUIDE NERV DESTR NEEDLE EMG: CPT | Performed by: PHYSICAL MEDICINE & REHABILITATION

## 2022-12-06 RX ORDER — LIDOCAINE HYDROCHLORIDE 20 MG/ML
8 INJECTION, SOLUTION INFILTRATION; PERINEURAL ONCE
Status: COMPLETED | OUTPATIENT
Start: 2022-12-06 | End: 2022-12-06

## 2022-12-06 RX ORDER — METHYLPREDNISOLONE SODIUM SUCCINATE 40 MG/ML
40 INJECTION, POWDER, LYOPHILIZED, FOR SOLUTION INTRAMUSCULAR; INTRAVENOUS ONCE
Status: COMPLETED | OUTPATIENT
Start: 2022-12-06 | End: 2022-12-06

## 2022-12-06 RX ADMIN — LIDOCAINE HYDROCHLORIDE 8 ML: 20 INJECTION, SOLUTION INFILTRATION; PERINEURAL at 12:47

## 2022-12-06 RX ADMIN — METHYLPREDNISOLONE SODIUM SUCCINATE 40 MG: 40 INJECTION, POWDER, LYOPHILIZED, FOR SOLUTION INTRAMUSCULAR; INTRAVENOUS at 12:48

## 2022-12-06 NOTE — LETTER
12/6/2022         RE: Samara Oropeza  [address]  [address]  [place] [address]        Dear Colleague,    Thank you for referring your patient, Samara Oropeza, to the [facility]. Please see a copy of my visit note below.      [facility]    PM&R CLINIC NOTE  BOTULINUM TOXIN PROCEDURE      HPI  Chief Complaint   Patient presents with     Procedure     Botox     Samara Oropeza is a 27 year old female who presents to clinic for botulinum toxin injections for management of chronic migraine headaches.     SINCE LAST VISIT  Samara Oropeza was last seen here in clinic on 6/16/2022, at which time she received 200 units of Botox. After that, she had occipital nerve blocks on 9/13/2022. The patient is currently pregnant, with a due date of 3/6/2023. She is opting for Botox injections today after going over risks and benefits of Botox during pregnancy. She has been cleared by her maternal fetal medicine doctor to continue with Botox throughout her pregnancy if needed.    Patient denies new medical diagnoses, illnesses, hospitalizations, emergency room visits, and injuries since the previous injection with botulinum neurotoxin.    RESPONSE TO PREVIOUS TREATMENT    Side effects: No problems reported    1.  Headache frequency during this injection cycle:  She has been having 25 headache days per month, with at least 8-12 severe migraine headache days per month. This is compared to her baseline headache frequency of 30 headache days per month.     2.  Headache duration during this injection cycle:  Headache duration ranged from 1-4 days. Patient reports a few episodes of multiple day headaches during this injection cycle, particularly as the Botox was wearing off.     3.  Headache intensity during this injection cycle:    A.  6/10  =  Typical pain level.  B.  10/10  =  Worst pain level.    C.  0/10  =  Lowest pain level.    4.   Change in headache medication usage during this injection cycle:  (For Example:  Able to decrease use of oral pain medications.) No changes.     5.  ER Visits During This Injection Cycle:  None.     6.  Functional Performance:  Change in ADL's, social interaction, days lost from work, etc. Patient reports being able to more fully participate in social and family activities and responsibilities as headache symptoms have improved      PHYSICAL EXAM  VS: /72 (BP Location: Right arm, Patient Position: Sitting, Cuff Size: Adult Regular)   Pulse 112    GEN: Pleasant and cooperative, in no acute distress  HEENT: No facial asymmetry    ALLERGIES  Allergies   Allergen Reactions     Amoxil [Penicillins] Rash     Dad unsure of reaction.     Bee Venom Anaphylaxis     Bioflavonoids Anaphylaxis     Citrus Anaphylaxis     All [employer]     Contrast Dye Rash     Contrast Media Ready-[facility] [place], 04/09/2014.; Contrast Media Ready-[facility] [place], 04/09/2014.  NOTE: this is a contrast media oral with iodine. Premedicate with methylpred standard for IV contrast, request barium contrast for oral contrast.     Diagnostic X-Ray Materials Hives and Rash     Contrast Media Ready-[facility] [place], 04/09/2014.; Contrast Media Ready-[facility] [place], 04/09/2014.  NOTE: this is a contrast media oral with iodine. Premedicate with methylpred standard for IV contrast, request barium contrast for oral contrast.     Pineapple Anaphylaxis, Difficulty breathing and Rash     Reglan [Metoclopramide] Other (See Comments)     IV dose only, in ER, rapid heart rate.     Ace Inhibitors      Difficulty in breathing and GI upset     Amitiza [Lubiprostone] Nausea and Vomiting     Amoxicillin-Pot Clavulanate      Midazolam Unknown     parent states that when pt takes this medication, she wakes up being very violent .     Other [No Clinical Screening - See Comments]      Bleech/ chest tightness, itchy throat, swollen tongue, hives     Tizanidine Other (See Comments)     Confusion,  back pain, photophobia, abdominal pain, shaking, anxious       Versed      Coming out of pelvic exam at age of 5, was kicking and screaming when coming out of the versed.     Adhesive Tape Rash     Azithromycin Hives and Rash     Cephalexin Itching and Rash     Sulfa Drugs Rash     Skin scarring       CURRENT MEDICATIONS    Current Outpatient Medications:      albuterol (PROAIR HFA/PROVENTIL HFA/VENTOLIN HFA) 108 (90 Base) MCG/ACT inhaler, Inhale 2 puffs into the lungs every 6 hours as needed for shortness of breath / dyspnea or wheezing, Disp: 1 Inhaler, Rfl: 1     amitriptyline (ELAVIL) 25 MG tablet, TAKE 1 TABLET BY MOUTH EVERY NIGHT AT BEDTIME, Disp: 90 tablet, Rfl: 1     apremilast (OTEZLA) 30 MG tablet, Take 1 tablet (30 mg) by mouth 2 times daily HOLD FOR SIGNS OF INFECTION, AND SEEK MEDICAL ATTENTION., Disp: 180 tablet, Rfl: 1     artificial tears OINT ophthalmic ointment, 0.5 inch strip each eye at night, Disp: 1 Tube, Rfl: 11     aspirin (ASA) 81 MG chewable tablet, , Disp: , Rfl:      benzocaine (TOPICALE XTRA) 20 % GEL, Apply as needed locally to mouth or nasal ulcers for pain; 4 times daily as needed, Disp: 30 g, Rfl: 1     betamethasone valerate (VALISONE) 0.1 % cream, Apply topically 2 times daily as needed , Disp: , Rfl:      bisacodyl (DULCOLAX) 5 MG EC tablet, Take 2 tablets (10 mg) by mouth daily as needed for constipation, Disp: 30 tablet, Rfl: 0     citalopram (CELEXA) 20 MG tablet, Take 1 tablet (20 mg) by mouth daily, Disp: 90 tablet, Rfl: 1     dexamethasone (DECADRON) 4 MG/ML injection, To be used by therapist during PT sessions., Disp: 30 mL, Rfl: 0     diclofenac (VOLTAREN) 75 MG EC tablet, Take 1 tablet (75 mg) by mouth 2 times daily as needed for moderate pain, Disp: 60 tablet, Rfl: 1     dicyclomine (BENTYL) 20 MG tablet, Take 1 tablet (20 mg) by mouth 4 times daily as needed, Disp: 120 tablet, Rfl: 3     diphenhydrAMINE (BENADRYL) 25 MG tablet, Take 1 tablet (25 mg) by mouth 3 times  daily as needed (itching), Disp: 40 tablet, Rfl: 1     enoxaparin ANTICOAGULANT (LOVENOX) 40 MG/0.4ML syringe, , Disp: , Rfl:      EPINEPHrine (EPIPEN 2-EAMON) 0.3 MG/0.3ML injection, Inject 0.3 mLs (0.3 mg) into the muscle as needed for anaphylaxis, Disp: 0.6 mL, Rfl: 3     gabapentin (NEURONTIN) 300 MG capsule, TAKE ONE CAPSULE BY MOUTH THREE TIMES DAILY , Disp: 90 capsule, Rfl: 0     hydrOXYzine (ATARAX) 25 MG tablet, TAKE ONE OR TWO TABLETS BY MOUTH EVERY SIX HOURS AS NEEDED, Disp: , Rfl:      hypromellose (GENTEAL) 0.3 % SOLN, 1 drop every hour as needed for dry eyes , Disp: , Rfl:      lidocaine (LMX4) 4 % external cream, Apply topically once as needed for mild pain, Disp: 120 g, Rfl: 1     LINZESS 290 MCG capsule, TAKE 1 CAPSULE BY MOUTH EVERY DAY IN THE MORNING BEFORE BREAKFAST, Disp: 90 capsule, Rfl: 0     LORazepam (ATIVAN) 0.5 MG tablet, TAKE 1 TABLET BY MOUTH EVERY 6 HOURS AS NEEDED FOR ANXIETY, Disp: 10 tablet, Rfl: 0     ondansetron (ZOFRAN-ODT) 8 MG ODT tab, Take 1 tablet (8 mg) by mouth every 8 hours as needed for nausea, Disp: 180 tablet, Rfl: 1     order for DME, Equipment being ordered: Trilok brace 8 1/2 left lower extremity, Disp: 1 Device, Rfl: 0     order for DME, Equipment being ordered: size 8 1/2 walking boot tall, Disp: 1 Device, Rfl: 0     polyethylene glycol (MIRALAX/GLYCOLAX) powder, Take 1 capful by mouth 3 times daily, Disp: , Rfl:      riboflavin 100 MG CAPS, Take 2 capsules by mouth daily, Disp: , Rfl:      rizatriptan (MAXALT) 5 MG tablet, Take 1 tablet (5 mg) by mouth at onset of headache for migraine May repeat in 2 hours. Max 6 tablets/24 hours., Disp: 10 tablet, Rfl: 1     sodium fluoride 1.1 % CREA, Apply 1 Application topically daily, Disp: , Rfl:      sucralfate (CARAFATE) 1 GM/10ML suspension, Take 10 mLs (1 g) by mouth 4 times daily, Disp: 1200 mL, Rfl: 2     triamcinolone (KENALOG) 0.1 % external ointment, Apply twice daily as needed to lesions on the genitals and body. OK  to use very sparingly on the face., Disp: 454 g, Rfl: 2     triamcinolone (KENALOG) 0.5 % external ointment, Apply 1 g topically 3 times daily as needed for irritation, Disp: 15 g, Rfl: 3     Apremilast (OTEZLA) 10 & 20 & 30 MG TBPK, Take by mouth according to the instructions on the packet. Hold for signs of infection,any GI upset, weight loss or depression concerns, and seek medical attention. (Patient not taking: Reported on 12/6/2022), Disp: 1 each, Rfl: 0     furosemide (LASIX) 40 MG tablet, TAKE ONE TABLET BY MOUTH TWICE DAILY  (Patient not taking: Reported on 12/6/2022), Disp: 60 tablet, Rfl: 0     lactulose 20 GM/30ML SOLN, Take 30 mLs by mouth 3 times daily as needed (for constipation) (Patient not taking: Reported on 12/6/2022), Disp: 300 mL, Rfl: 3     Prenatal MV-Min-Fe Fum-FA-DHA (PRENATAL 1 PO), , Disp: , Rfl:      triamcinolone (KENALOG) 0.1 % paste, Take by mouth 2 times daily (Patient not taking: Reported on 12/6/2022), Disp: 5 g, Rfl: 5    Current Facility-Administered Medications:      botulinum toxin type A (BOTOX) 100 units injection 200 Units, 200 Units, Intramuscular, Q90 Days, Alejandrina Hernandez MD     botulinum toxin type A (BOTOX) 100 units injection 200 Units, 200 Units, Intramuscular, Q90 Days, Alejandrina Hernandez MD, 200 Units at 06/24/22 1524     lidocaine 1 % injection 0.5 mL, 0.5 mL, , , Andres Johnson DPPB, 0.5 mL at 10/11/21 0928     lidocaine 1 % injection 0.5 mL, 0.5 mL, , , Andres Johnson DPM, 0.5 mL at 06/15/21 0957     lidocaine 1 % injection 0.5 mL, 0.5 mL, , , Andres Johnson DPM, 0.5 mL at 09/15/20 1554     methylPREDNISolone (DEPO-MEDROL) injection 40 mg, 40 mg, , , Yeo, Albert, MD, 40 mg at 09/29/20 1154     triamcinolone (KENALOG-40) injection 40 mg, 40 mg, , , Andres Johnson DPM, 40 mg at 10/11/21 0928     triamcinolone (KENALOG-40) injection 40 mg, 40 mg, , , Andres Johnson DPM, 40 mg at 06/15/21 0957     triamcinolone (KENALOG-40)  injection 40 mg, 40 mg, , , Andres Johnson, ROSIBEL, 40 mg at 09/15/20 1554       BOTULINUM NEUROTOXIN INJECTION PROCEDURES    VERIFICATION OF PATIENT IDENTIFICATION AND PROCEDURE     Initials   Patient Name SES   Patient  SES   Procedure Verified by: DEYSI     Prior to the start of the procedure and with procedural staff participation, I verbally confirmed the patient s identity using two indicators, relevant allergies, that the procedure was appropriate and matched the consent or emergent situation, and that the correct equipment/implants were available. Immediately prior to starting the procedure I conducted the Time Out with the procedural staff and re-confirmed the patient s name, procedure, and site/side. (The Joint Commission universal protocol was followed.)  Yes    Sedation (Moderate or Deep): None    ABOVE ASSESSMENTS PERFORMED BY    Alejandrina Hernandez MD      INDICATIONS FOR PROCEDURES  Samara Oropeza is a 27 year old patient with pain secondary to the diagnosis of chronic migraine headaches. Her baseline symptoms have been recalcitrant to oral medications and conservative therapy.  She is here today for reinjection with Botox.    GOAL OF PROCEDURE  The goal of this procedure is to decrease pain.      TOTAL DOSE ADMINISTERED  Dose Administered:  150 units  Botox (Botulinum Toxin Type A)       2:1 Dilution   Unavoidable Drug Waste: Yes  Amount of drug waste (mL): 50 units Botox.  Reason for waste:  Single use vial.  Diluent Used:  Preservative Free Normal Saline  Total Volume of Diluent Used:  3 ml  Lot # [id number] with Expiration Date:  2024  NDC #: Botox 100u ([address]-19)      CONSENT  The risks, benefits, and treatment options were discussed with Samara Oropeza and she agreed to proceed.    Written consent was obtained by [facility].     EQUIPMENT USED  Needle-25mm stimulating/recording  Needle-30 gauge  EMG/NCS Machine    SKIN PREPARATION  Skin preparation was performed using an alcohol  wipe.    GUIDANCE DESCRIPTION  Electro-myographic guidance was necessary throughout the neck portion of the procedure to accurately identify all areas of spastic muscles while avoiding injection of non-spastic muscles, neighboring nerves and nearby vascular structures.     AREA/MUSCLE INJECTED:  150 UNITS BOTOX = TOTAL DOSE, 2:1 DILUTION     1 & 2. SHOULDER GIRDLE & NECK MUSCLES: 40 UNITS BOTOX = TOTAL DOSE     Right Splenius Cervicis - 5 units of Botox over 2 site/s.   Left Splenius Cervicis - 5 units of Botox over 2 site/s.     Right Rectus Capitis - 2.5 units of Botox at 1 site.  Left Rectus Capitis - 2.5 units of Botox at 1site.     Right Levator Scapulae - 10 units of Botox over 2 site/s.   Left Levator Scapulae - 10 units of Botox over 2 site/s.    Right Anterior Scalene - 2.5 units of Botox over 1 site/s.   Left Anterior Scalene - 2.5 units of Botox over 1 site/s.       3. HEAD & SCALP MUSCLES: 110 UNITS BOTOX = TOTAL DOSE     Right Occipitalis - 10 units of Botox over 2 site/s.   Left Occipitalis - 10 units of Botox over 2 site/s.     Right Frontalis - 10 units of Botox over 2 site/s.  Left Frontalis - 10 units of Botox over 2 site/s.     Right Temporalis - 30 units of Botox over 5 site/s.  Left Temporalis - 30 units of Botox over 5 site/s.     Right  - 2.5 units of Botox over 1 site/s.              Left  - 2.5 units of Botox over 1 site/s.                 Procerus - 5 units of Botox at 1 site/s.      RIGHT & LEFT GREATER OCCIPITAL NERVE BLOCKS    Prior to the start of the procedure and with procedural staff participation, I verbally confirmed the patient s identity using two indicators, relevant allergies, that the procedure was appropriate and matched the consent or emergent situation, and that the correct equipment/implants were available. Immediately prior to starting the procedure I conducted the Time Out with the procedural staff and re-confirmed the patient s name, procedure, and  site/side. (The Joint Commission universal protocol was followed.)  Yes    Sedation (Moderate or Deep): None    Dru% lidocaine: 15 ml (Batch: [id number], Exp: 2023, NDC: 55150-254-10)  Methylprednisolone: 40 mg = 1 ml (Lot: [id number], Exp: 2024, NDC: [address]-9)    Occipital nerve block:  Area just inferior to insertion of the right and left superior trapezius insertion onto skull was cleansed with ChloraPrep. Needle was advanced anteriorly to base of skull then slightly withdrawn and injectate was injected in a fan-like distribution at different depths. A 10 ml mixture of 2% lidocaine (9 ml) and methylprednisolone (40 mg/ml = 1 ml) was divided into two 5 ml syringes. Total injection volume = 5 ml per side.       RESPONSE TO PROCEDURE  Samara Oropeza tolerated the procedure well and there were no immediate complications. She was allowed to recover for an appropriate period of time and was discharged home in stable condition.    ASSESSMENT AND PLAN   1. Botulinum toxin injections: Botox dose decreased from 200 units to 150 units due to the fact that she is pregnant and should be maintained on lowest dose of Botox possible to achieve adequate headache reduction. If headaches are not optimally controlled on the lower dose, we may discuss increasing dose at next visit. She was also given occipital nerve blocks today, as an adjuvant to her Botox. Patient will continue to monitor response and report at next appointment.   2. Referrals: None.   3. Follow up: Samara Oropeza was rescheduled for the next series of injections in 12 weeks, at which time we will evaluate response to today's injections. she may call the clinic prior with any questions or concerns prior to the next appointment.         Again, thank you for allowing me to participate in the care of your patient.        Sincerely,        Alejandrina Hernandez MD

## 2022-12-06 NOTE — PROGRESS NOTES
[facility]    PM&R CLINIC NOTE  BOTULINUM TOXIN PROCEDURE      HPI  Chief Complaint   Patient presents with     Procedure     Botox     Samara Oropeza is a 27 year old female who presents to clinic for botulinum toxin injections for management of chronic migraine headaches.     SINCE LAST VISIT  Samara Oropeza was last seen here in clinic on 6/16/2022, at which time she received 200 units of Botox. After that, she had occipital nerve blocks on 9/13/2022. The patient is currently pregnant, with a due date of 3/6/2023. She is opting for Botox injections today after going over risks and benefits of Botox during pregnancy. She has been cleared by her maternal fetal medicine doctor to continue with Botox throughout her pregnancy if needed.    Patient denies new medical diagnoses, illnesses, hospitalizations, emergency room visits, and injuries since the previous injection with botulinum neurotoxin.    RESPONSE TO PREVIOUS TREATMENT    Side effects: No problems reported    1.  Headache frequency during this injection cycle:  She has been having 25 headache days per month, with at least 8-12 severe migraine headache days per month. This is compared to her baseline headache frequency of 30 headache days per month.     2.  Headache duration during this injection cycle:  Headache duration ranged from 1-4 days. Patient reports a few episodes of multiple day headaches during this injection cycle, particularly as the Botox was wearing off.     3.  Headache intensity during this injection cycle:    A.  6/10  =  Typical pain level.  B.  10/10  =  Worst pain level.    C.  0/10  =  Lowest pain level.    4.  Change in headache medication usage during this injection cycle:  (For Example:  Able to decrease use of oral pain medications.) No changes.     5.  ER Visits During This Injection Cycle:  None.     6.  Functional Performance:  Change in ADL's, social interaction, days lost from work, etc.  Patient reports being able to more fully participate in social and family activities and responsibilities as headache symptoms have improved      PHYSICAL EXAM  VS: /72 (BP Location: Right arm, Patient Position: Sitting, Cuff Size: Adult Regular)   Pulse 112    GEN: Pleasant and cooperative, in no acute distress  HEENT: No facial asymmetry    ALLERGIES  Allergies   Allergen Reactions     Amoxil [Penicillins] Rash     Dad unsure of reaction.     Bee Venom Anaphylaxis     Bioflavonoids Anaphylaxis     Citrus Anaphylaxis     All [place]     Contrast Dye Rash     [place]rast Media [place] [facility] [place]rast Media [place] [facility] [place] [facility] [place]  NOTE: this is a contrast media oral with iodine. Premedicate with methylpred standard for IV contrast, request barium contrast for oral contrast.     Diagnostic X-Ray Materials Hives and Rash     Contrast Media [place]-Box [facility] [place] [facility] [place] Contrast Media [place] [facility] [place] [facility] [place].  NOTE: this is a contrast media oral with iodine. Premedicate with methylpred standard for IV contrast, request barium contrast for oral contrast.     Pineapple Anaphylaxis, Difficulty breathing and Rash     Reglan [Metoclopramide] Other (See Comments)     IV dose only, in ER, rapid heart rate.     Ace Inhibitors      Difficulty in breathing and GI upset     Amitiza [Lubiprostone] Nausea and Vomiting     Amoxicillin-Pot Clavulanate      Midazolam Unknown     parent states that when pt takes this medication, she wakes up being very violent .     Other [No Clinical Screening - See Comments]      Bleech/ chest tightness, itchy throat, swollen tongue, hives     Tizanidine Other (See Comments)     Confusion, back pain, photophobia, abdominal pain, shaking, anxious       Versed      Coming out of pelvic exam at age of 5, was kicking and screaming when coming out of the versed.     Adhesive Tape Rash     Azithromycin Hives and Rash     Cephalexin Itching and Rash     Sulfa Drugs Rash     Skin  scarring       CURRENT MEDICATIONS    Current Outpatient Medications:      albuterol (PROAIR HFA/PROVENTIL HFA/VENTOLIN HFA) 108 (90 Base) MCG/ACT inhaler, Inhale 2 puffs into the lungs every 6 hours as needed for shortness of breath / dyspnea or wheezing, Disp: 1 Inhaler, Rfl: 1     amitriptyline (ELAVIL) 25 MG tablet, TAKE 1 TABLET BY MOUTH EVERY NIGHT AT BEDTIME, Disp: 90 tablet, Rfl: 1     apremilast (OTEZLA) 30 MG tablet, Take 1 tablet (30 mg) by mouth 2 times daily HOLD FOR SIGNS OF INFECTION, AND SEEK MEDICAL ATTENTION., Disp: 180 tablet, Rfl: 1     artificial tears OINT ophthalmic ointment, 0.5 inch strip each eye at night, Disp: 1 Tube, Rfl: 11     aspirin (ASA) 81 MG chewable tablet, , Disp: , Rfl:      benzocaine (TOPICALE XTRA) 20 % GEL, Apply as needed locally to mouth or nasal ulcers for pain; 4 times daily as needed, Disp: 30 g, Rfl: 1     betamethasone valerate (VALISONE) 0.1 % cream, Apply topically 2 times daily as needed , Disp: , Rfl:      bisacodyl (DULCOLAX) 5 MG EC tablet, Take 2 tablets (10 mg) by mouth daily as needed for constipation, Disp: 30 tablet, Rfl: 0     citalopram (CELEXA) 20 MG tablet, Take 1 tablet (20 mg) by mouth daily, Disp: 90 tablet, Rfl: 1     dexamethasone (DECADRON) 4 MG/ML injection, To be used by therapist during PT sessions., Disp: 30 mL, Rfl: 0     diclofenac (VOLTAREN) 75 MG EC tablet, Take 1 tablet (75 mg) by mouth 2 times daily as needed for moderate pain, Disp: 60 tablet, Rfl: 1     dicyclomine (BENTYL) 20 MG tablet, Take 1 tablet (20 mg) by mouth 4 times daily as needed, Disp: 120 tablet, Rfl: 3     diphenhydrAMINE (BENADRYL) 25 MG tablet, Take 1 tablet (25 mg) by mouth 3 times daily as needed (itching), Disp: 40 tablet, Rfl: 1     enoxaparin ANTICOAGULANT (LOVENOX) 40 MG/0.4ML syringe, , Disp: , Rfl:      EPINEPHrine (EPIPEN 2-EAMON) 0.3 MG/0.3ML injection, Inject 0.3 mLs (0.3 mg) into the muscle as needed for anaphylaxis, Disp: 0.6 mL, Rfl: 3     gabapentin  (NEURONTIN) 300 MG capsule, TAKE ONE CAPSULE BY MOUTH THREE TIMES DAILY , Disp: 90 capsule, Rfl: 0     hydrOXYzine (ATARAX) 25 MG tablet, TAKE ONE OR TWO TABLETS BY MOUTH EVERY SIX HOURS AS NEEDED, Disp: , Rfl:      hypromellose (GENTEAL) 0.3 % SOLN, 1 drop every hour as needed for dry eyes , Disp: , Rfl:      lidocaine (LMX4) 4 % external cream, Apply topically once as needed for mild pain, Disp: 120 g, Rfl: 1     LINZESS 290 MCG capsule, TAKE 1 CAPSULE BY MOUTH EVERY DAY IN THE MORNING BEFORE BREAKFAST, Disp: 90 capsule, Rfl: 0     LORazepam (ATIVAN) 0.5 MG tablet, TAKE 1 TABLET BY MOUTH EVERY 6 HOURS AS NEEDED FOR ANXIETY, Disp: 10 tablet, Rfl: 0     ondansetron (ZOFRAN-ODT) 8 MG ODT tab, Take 1 tablet (8 mg) by mouth every 8 hours as needed for nausea, Disp: 180 tablet, Rfl: 1     order for DME, Equipment being ordered: [employer] brace 8 1/2 left lower extremity, Disp: 1 Device, Rfl: 0     order for DME, Equipment being ordered: size 8 1/2 walking boot tall, Disp: 1 Device, Rfl: 0     polyethylene glycol (MIRALAX/GLYCOLAX) powder, Take 1 capful by mouth 3 times daily, Disp: , Rfl:      riboflavin 100 MG CAPS, Take 2 capsules by mouth daily, Disp: , Rfl:      rizatriptan (MAXALT) 5 MG tablet, Take 1 tablet (5 mg) by mouth at onset of headache for migraine May repeat in 2 hours. Max 6 tablets/24 hours., Disp: 10 tablet, Rfl: 1     sodium fluoride 1.1 % CREA, Apply 1 Application topically daily, Disp: , Rfl:      sucralfate (CARAFATE) 1 GM/10ML suspension, Take 10 mLs (1 g) by mouth 4 times daily, Disp: 1200 mL, Rfl: 2     triamcinolone (KENALOG) 0.1 % external ointment, Apply twice daily as needed to lesions on the genitals and body. OK to use very sparingly on the face., Disp: 454 g, Rfl: 2     triamcinolone (KENALOG) 0.5 % external ointment, Apply 1 g topically 3 times daily as needed for irritation, Disp: 15 g, Rfl: 3     Apremilast (OTEZLA) 10 & 20 & 30 MG TBPK, Take by mouth according to the instructions on the  packet. Hold for signs of infection,any GI upset, weight loss or depression concerns, and seek medical attention. (Patient not taking: Reported on 2022), Disp: 1 each, Rfl: 0     furosemide (LASIX) 40 MG tablet, TAKE ONE TABLET BY MOUTH TWICE DAILY  (Patient not taking: Reported on 2022), Disp: 60 tablet, Rfl: 0     lactulose 20 GM/30ML SOLN, Take 30 mLs by mouth 3 times daily as needed (for constipation) (Patient not taking: Reported on 2022), Disp: 300 mL, Rfl: 3     Prenatal MV-Min-Fe Fum-FA-DHA (PRENATAL 1 PO), , Disp: , Rfl:      triamcinolone (KENALOG) 0.1 % paste, Take by mouth 2 times daily (Patient not taking: Reported on 2022), Disp: 5 g, Rfl: 5    Current Facility-Administered Medications:      botulinum toxin type A (BOTOX) 100 units injection 200 Units, 200 Units, Intramuscular, Q90 Days, Alejandrina Hernandez MD     botulinum toxin type A (BOTOX) 100 units injection 200 Units, 200 Units, Intramuscular, Q90 Days, Alejandrina Hernandez MD, 200 Units at 22 1524     lidocaine 1 % injection 0.5 mL, 0.5 mL, , , Andres Johnson, DPM, 0.5 mL at 10/11/21 0928     lidocaine 1 % injection 0.5 mL, 0.5 mL, , , Andres Johnsno, DPM, 0.5 mL at 06/15/21 0957     lidocaine 1 % injection 0.5 mL, 0.5 mL, , , Andres Johnson, DPM, 0.5 mL at 09/15/20 1554     methylPREDNISolone (DEPO-MEDROL) injection 40 mg, 40 mg, , , Yeo, Albert, MD, 40 mg at 20 1154     triamcinolone (KENALOG-40) injection 40 mg, 40 mg, , , Andres Johnson DPM, 40 mg at 10/11/21 0928     triamcinolone (KENALOG-40) injection 40 mg, 40 mg, , , Andres Johnson DPM, 40 mg at 06/15/21 0957     triamcinolone (KENALOG-40) injection 40 mg, 40 mg, , , Andres Johnson DPM, 40 mg at 09/15/20 1554       BOTULINUM NEUROTOXIN INJECTION PROCEDURES    VERIFICATION OF PATIENT IDENTIFICATION AND PROCEDURE     Initials   Patient Name SES   Patient  SES   Procedure Verified by: SES     Prior to the start  of the procedure and with procedural staff participation, I verbally confirmed the patient s identity using two indicators, relevant allergies, that the procedure was appropriate and matched the consent or emergent situation, and that the correct equipment/implants were available. Immediately prior to starting the procedure I conducted the Time Out with the procedural staff and re-confirmed the patient s name, procedure, and site/side. (The Joint Commission universal protocol was followed.)  Yes    Sedation (Moderate or Deep): None    ABOVE ASSESSMENTS PERFORMED BY    Alejandrina Hernandez MD      INDICATIONS FOR PROCEDURES  Samara Oropeza is a 27 year old patient with pain secondary to the diagnosis of chronic migraine headaches. Her baseline symptoms have been recalcitrant to oral medications and conservative therapy.  She is here today for reinjection with Botox.    GOAL OF PROCEDURE  The goal of this procedure is to decrease pain.      TOTAL DOSE ADMINISTERED  Dose Administered:  150 units  Botox (Botulinum Toxin Type A)       2:1 Dilution   Unavoidable Drug Waste: Yes  Amount of drug waste (mL): 50 units Botox.  Reason for waste:  Single use vial.  Diluent Used:  Preservative Free Normal Saline  Total Volume of Diluent Used:  3 ml  Lot # [id number] with Expiration Date:  11/2024  NDC #: Botox 100u ([address]-26)      CONSENT  The risks, benefits, and treatment options were discussed with Samara Oropeza and she agreed to proceed.    Written consent was obtained by [facility].     EQUIPMENT USED  Needle-25mm stimulating/recording  Needle-30 gauge  EMG/NCS Machine    SKIN PREPARATION  Skin preparation was performed using an alcohol wipe.    GUIDANCE DESCRIPTION  Electro-myographic guidance was necessary throughout the neck portion of the procedure to accurately identify all areas of spastic muscles while avoiding injection of non-spastic muscles, neighboring nerves and nearby vascular structures.     AREA/MUSCLE  INJECTED:  150 UNITS BOTOX = TOTAL DOSE, 2:1 DILUTION     1 & 2. SHOULDER GIRDLE & NECK MUSCLES: 40 UNITS BOTOX = TOTAL DOSE     Right Splenius Cervicis - 5 units of Botox over 2 site/s.   Left Splenius Cervicis - 5 units of Botox over 2 site/s.     Right Rectus Capitis - 2.5 units of Botox at 1 site.  Left Rectus Capitis - 2.5 units of Botox at 1site.     Right Levator Scapulae - 10 units of Botox over 2 site/s.   Left Levator Scapulae - 10 units of Botox over 2 site/s.    Right Anterior Scalene - 2.5 units of Botox over 1 site/s.   Left Anterior Scalene - 2.5 units of Botox over 1 site/s.       3. HEAD & SCALP MUSCLES: 110 UNITS BOTOX = TOTAL DOSE     Right Occipitalis - 10 units of Botox over 2 site/s.   Left Occipitalis - 10 units of Botox over 2 site/s.     Right Frontalis - 10 units of Botox over 2 site/s.  Left Frontalis - 10 units of Botox over 2 site/s.     Right Temporalis - 30 units of Botox over 5 site/s.  Left Temporalis - 30 units of Botox over 5 site/s.     Right  - 2.5 units of Botox over 1 site/s.              Left  - 2.5 units of Botox over 1 site/s.                 Procerus - 5 units of Botox at 1 site/s.      RIGHT & LEFT GREATER OCCIPITAL NERVE BLOCKS    Prior to the start of the procedure and with procedural staff participation, I verbally confirmed the patient s identity using two indicators, relevant allergies, that the procedure was appropriate and matched the consent or emergent situation, and that the correct equipment/implants were available. Immediately prior to starting the procedure I conducted the Time Out with the procedural staff and re-confirmed the patient s name, procedure, and site/side. (The Joint Commission universal protocol was followed.)  Yes    Sedation (Moderate or Deep): None    Dru% lidocaine: 15 ml (Batch: [id number], Exp: 2023, NDC: 55150-254-10)  Methylprednisolone: 40 mg = 1 ml (Lot: [id number], Exp: 2024, NDC:  [address]-8)    Occipital nerve block:  Area just inferior to insertion of the right and left superior trapezius insertion onto skull was cleansed with ChloraPrep. Needle was advanced anteriorly to base of skull then slightly withdrawn and injectate was injected in a fan-like distribution at different depths. A 10 ml mixture of 2% lidocaine (9 ml) and methylprednisolone (40 mg/ml = 1 ml) was divided into two 5 ml syringes. Total injection volume = 5 ml per side.       RESPONSE TO PROCEDURE  Samara Oropeza tolerated the procedure well and there were no immediate complications. She was allowed to recover for an appropriate period of time and was discharged home in stable condition.    ASSESSMENT AND PLAN   1. Botulinum toxin injections: Botox dose decreased from 200 units to 150 units due to the fact that she is pregnant and should be maintained on lowest dose of Botox possible to achieve adequate headache reduction. If headaches are not optimally controlled on the lower dose, we may discuss increasing dose at next visit. She was also given occipital nerve blocks today, as an adjuvant to her Botox. Patient will continue to monitor response and report at next appointment.   2. Referrals: None.   3. Follow up: Samara Oropeza was rescheduled for the next series of injections in 12 weeks, at which time we will evaluate response to today's injections. she may call the clinic prior with any questions or concerns prior to the next appointment.

## 2023-01-25 ENCOUNTER — VIRTUAL VISIT (OUTPATIENT)
Dept: RHEUMATOLOGY | Facility: CLINIC | Age: 28
End: 2023-01-25
Attending: INTERNAL MEDICINE
Payer: COMMERCIAL

## 2023-01-25 DIAGNOSIS — M35.2 BEHCET'S DISEASE (H): Primary | ICD-10-CM

## 2023-01-25 PROCEDURE — 99214 OFFICE O/P EST MOD 30 MIN: CPT | Mod: VID | Performed by: INTERNAL MEDICINE

## 2023-01-25 PROCEDURE — G0463 HOSPITAL OUTPT CLINIC VISIT: HCPCS | Mod: PN,GT | Performed by: INTERNAL MEDICINE

## 2023-01-25 NOTE — LETTER
2023       RE: Samara Oropeza  [address]  [facility] [place] [address]     Dear Colleague,    Thank you for referring your patient, Samara Oropeza, to the [facility] [place] [facility]. Please see a copy of my visit note below.    Samara is a 27 year old who is being evaluated via a billable video visit.      How would you like to obtain your AVS? MyChart  If the video visit is dropped, the invitation should be resent by: Text to cell phone: [phone]  Will anyone else be joining your video visit? No      Will be joined today by boyfriend Avi.    Angela De Santiago on 2023 at 2:21 PM      Video-Visit Details    Type of service:  Video Visit   Video Start Time: 2:31 PM  Video End Time:2:44 PM    Originating Location (pt. Location): Home    Distant Location (provider location):  On-site  Platform used for Video Visit: [facility]       Rheumatology Clinic Return Visit Patient     Samara Oropeza MRN# [id number]   YOB: 1995 Age: 27 year old     Date of Visit: 2023  [facility]: [facility] [place]          Assessment & Plan    Assessment & Plan   Samara is a 27 year old  female (ADRIAN 22) with Behçet's disease (pathergy, oral/genital ulcers, polyarthralgia) who presents today for Follow Up (3 month)  .    # Behçet's disease (pathergy, arthralgias, recurrent oral / genital ulcers)  # High-risk pregnancy (ADRIAN 23)    10/2022:  Mrs. Oropeza arrives for follow-up of her Behçet's disease that is more active at present with recent decrease of her Otezla (60mg --> 30mg) recommended by [facility]. Previous discussion with her OB providers had been to decrease to the lowest effective dose, clearly 30mg is not effective as she describes worsened oral ulcers, genital ulcers, arthralgias, abdominal pain, and thrombophlebitis. Although there is not a  great deal of evidence for Otezla safety in pregnancy, its mechanism of action would suggest it. Counterbalancing these concerns are what we understand of Behçet's in pregnancy which more often improves but in some cases (~29%) becomes worse, and can flare more often in the 1st trimester and post-lorena period.  She also describes some vision changes, which in the context of Behçet's is conerning. She has not seen Ophthalmology in many years; I will place a referral today.     Today 2023:    Continue otezla 30mg BID      Plan:    Return video visit in about 3-4 months    Please schedule eye exam    Dominga Sosa MD             Medical History   History of Present Illness   Samara Oropeza is a 27 year old female who presents for follow-up of her Behçet's in the context of pregnancy.      [place] Marilyn [place] [facility] [place]    Original presentation 2014:     Ms Oropeza is a 19 y.o. female who presents with one year of presentation of painful oral ulcers (monthly) once that have worsened with two episodes in the last two months that presented with painful vulvar or vaginal ulcers (2 episodes so far every month) [not sure if they leave scar] as well that timing coincides with menses ([facility] [place]: 14).   ORAL ULCERS: last two episodes were severe, painful, white base with erythematous halo, that appear and disappear in the matter of 1-2 weeks without, does not bleed and doesn't respond to any treatment used (magic mouthwash), 10-15 in number with the biggest one being dime size.      VAGINAL ULCERS: 2-3 in number between labia majora and minora that occur simultaneously with oral ulcers, painful, non bleeding ulcers that are erythematous, no pus.      FOLLICULITIS: patient reports having spots in legs specially around ankle and knee, but arms, and neck are affected as well. Small lesions that resemble ingrown hair, most are red erythematous elevated lesions, well demarcated, some with liquid that  patient refers as pimple like.      SKIN RASHES: welts and red macules with well defined borders that are pruritic and non-ulcerative on chest, arms and back. Benadryl relieves.      ARTHRALGIAS: In descending order of severity: hips, knees, wrists, shoulders, neck, lumbar, fingers.   C/o morning stiffness in hips, back and neck lasting for about until noon.      Ms. Oropeza reports they present in severe flares and never fully resolve, but do get better. She has seen some swelling and warmth on the affected joints but has not noticed and redness. Has tried Tylenol, ibuprofen, and hydrocodone with not much benefit.      FEVERS: Reports 3-4 sporadic episodes per month of self limited fevers of 38 C that occur during the evenings and associate facial flushing.      HEADACHES: occur daily and are bitemporal and irradiate occipitally, pulsatile in nature, intensity varies from intense to mild, are worsened with movement. On treatment with ibuprofen and somatriptan without any positive results.      VISION CHANGES: Patient reports that suddenly she would only see a gray blotch in her right eyes and would persist like this for a day and a half. Also reports blurriness in her left eye. Presence of pain and burning sensation of eyeball with associated redness. Has changed prescription glasses in the matter of 2 years 3 times. She has had opthalmology exam and was not suggestive of any evidence of uveitis.   She was also evaluated in ED for a dizzy spell.      ABD PAIN W/ INTERMITTENT DIARRHEA AND CONSTIPATION: Patient reports abdominal pain that is intermittent, periods of 7 days without bowel movement and feeling bloated with change of pattern to diarrhea (liquidy without blood nor mucus, non foul smelling). Has taken Bentyl, Zegrid, and rinetidine with mild clinical improvement.  She also reports small red nodules after each needle stick for blood draw.   Neurology saw her back then and was not impressed with her  presentation as physical examination was normal. Also MRI brain normal: Findings: No definite hemorrhage, mass affect, midline shift, or ventriculomegaly is noted.There are a few scattered non specific white matter T2 hyperintensities. Axial diffusion weighted images are unremarkable.     The major vascular structures appear patent. There is opacification and severe mucosal thickening in the right maxillary sinus. The remaining paranasal sinuses, and mastoid air cells are clear. Orbits are unremarkable  She has + HSV-1 IGG. Seen ID. Negative for Herpes simplex virus culture. HSV IGM I/II COMBINATION SENT TO [facility]  RESULTS = <0.91  REF RANGE: <0.91 = NEGATIVE  ID did not think HSV could explain her mouth and genital sores.      CRP within normal limits. HIV negative. CBC within normal limits; UA negative. Creatinine within normal limits   CYNDIE neg.  Antigliadin neg.   ANti TTG neg.   [facility] 2014: Colonoscopy and endoscopy neg; result not available. Not sure if biopsies were done.   Raynaud's phenomenon:  Onset: about 2013  Digits: all fingers  Digital ulcers: no  Color changes: white ---> purple and red  Duration of an attack: About couple of hours per mom  Pain during attack: not clear pain but bruise like feeling  Frequency of attacks: few times a month  Family history of Raynauds: no  GERD: very long time     No hemoptysis.   No miscarriages/ no thrombosis in past.   No family or personal history of psoriasis, ulcerative colitis or chron's disease.   No buttock pain or low back pain or stiffness in AM     Interim history:  Dec 2014- Multiple mouth ulcers and did not eat for 5 days. This coincided with periods. Colchicine 0.6mg PO BID started in Nov 2014.   Prednisone taper in Dec 20mg to 0 in 4 weeks. She also used magic mouthwash. Kenalog cream. After going to the ER, 3 days later her ulcers were better. She thinks it improved after the menstruation stopped.   LMP 3 weeks ago.   COLCHICINE HAS HELPED A LOT  REDUCING THE INTENSITY OF MENSTRUATION ASSOCIATED ORAL AND VULVAR ULCERS.      Interim history: 6/15     Since last visit only two episodes of mouth sores- small sized 6   No genital ulcers since last visit.   Colchicine 1.2 mg in AM and 0.6mg in PM keeps her symptoms under control.      Admitted in 5/15:  Admission Diagnoses:  - LUE weakness  - spell  - hx of migraines  - hx of Tourette syndrome  - hx of Behcet's disease     Discharge Diagnoses:   - spell likely 2/2 anxiety  - hx of migraines  - hx of Tourette syndrome  - hx of Behcet's disease     CT and MRI brain was normal.      Seeing Dr. Lilliana Miramontes soon as outpatient.      Dr. Garcia did not find any intraocular inflammation.       Interm 10/30/2015  ED for migraine. Continues to see neuro - MRI brain without lesions noted. Saw GI - upper barium normal. May be considering repeat colo. Worsening lesions in mouth and genitals. Has had continuous lesion in mouth x 2 months. 2 genital ulcers. Had fracture of right sesamoid in foot. Continues to have low grade fevers. New folliculitis on chest, legs and arms.      Interim hx: 1/11/2016    Pt states that since last visit she continues to have oral ulcers and has noted more folliculitis-like lesions on her lower extremities.  She states that on her right lower leg she had a red macular spot that has since resolved.  She denies uveitis.  She states that the colchicine initially helped but then stopped working.  She takes 0.6 mg TID.  She stopped taking her prednisone last week as she feels like this hasn't helped.  She hasn't had any episodes of vaginal ulcers.      She saw GI who stated she has gastroparesis.  She is seeing Cardiology later this week for possible syncopal episodes.       Interim history 5/16  Mouth ulcers X 1 last month  Genital ulcers - none since last visit.      Bilateral MCPs pain, knee pain and low back pain +ve increased since last visit  Morning stiffness X 2 hours (increasing)  "  5-6/10     \"overall myalgia\" has gotten worse    C/o pseudofolliculitis lesion on anterior shins bilaterally worse     Interim history: (7/18/2016)  Patient has just started Humira for 2 doses on 7/1 and 7/15 and reported minimal improvement of her hip pain but otherwise, did not notice anything different.      She reported painful mouth ulcer once a month since last visit but noticed that it has been getting deeper.   Denied any genital ulcers.     Still has ongoing bilateral MCPs pain, knee pain but this has been intermittent and she did not have any much pain today. She reported 2-3 hours morning stiffness of both hands and pain score of 6/10 at her knees and 8/10 of her hands but currently takes no pain medication except prn ativan which she takes when her muscle is really tight.      The only concern is that she gained weight even though she has not been eating much (eat once a day) and do exercises every day for 1 hour (with video of yoga, pilates). Her mother wonders if she needs to have some labs work up include sex hormone, thyroid, cortisol.     Interval history 9/14/16  Patient was started Humira at the last visit, patient reports worsening of skin ulcers since starting Humira and stopped taking Humura for the past 2 weeks. Patient reports oral and genital ulcers has been stable even before starting Humira and has not had any interval oral/genital ulcers. However she reports recurrent skin ulcers occurring on a daily basis that affects her chest and anterior legs. Patient also has stopped taking prednisone, she reports that she doesn't feel the prednisone helps, her last dose of prednisone was 6+ months ago. Patient also report worsening low back pain that sometimes causes her to limp.     In the interval patient also visited ED on 8/31/16 for left hand pain and what the patient describes as phlebitis, no medication or procedure was done and the patient was discharged with a splint. Patient also " reported 1 episode of chest pressure that was associated with dyspnea and numbness of the left arm, she was shopping in a supermarket at the time of onset, and the pressure resolved after she took a nap.     Patient denies any infectious symptoms in the interval, no fever, chills, night sweat, cough, dysuria, denies eye pain or visual changes.     November 4, 2016  Oct - had one genital ulcer  Oral ulcers X 5- around menstruation time.   Knee pain- chronic on the left side  Dizziness spells - on and off; been to the ER for that in the past.   On and off migraines  July 2013 - s/p MPFL reconstruction plus LRL 7/2013  Morning stiffness in knee (left) X 1 hour     Severe jaw pain and chest pain- patient wondering if this is Tourette's or esophageal spasms. Cardiac workup negative.   Fever      January 13, 2017  Yesterday in [place] [facility] [place] with orthostatic syncope from dehydration.   Chest pain on and off still continues. Painful with deep breaths.   Oral ulcers - few minor ones lasting for one week;   One major one in roof of mouth lasting for about one week.   Knee pain +ve - reviewed the recent MRI with her orthopedic surgeon.   On and off migraines  otezla is approved. Still waiting to receive the meds.      March 31, 2017  Otezla current dose 15mg PO BID.   She is tolerating okay at this dose and is willing to increase the dose further up to 30mg BID.   Her joint pains are better on otezla. Knee pain is also better.   Back and neck pain +ve 8/10 when it is worse. Intramuscular botox injections were given on Monday in PMR.   2 minor genital ulcers in the interim- resolved.   Few oral ulcers in the interim- resolved.   Nasal ulcer - resolved.   Migraines on and off.   Nausea with otezla but improving.   Dizziness and blackouts on and off. She fell once and hurt her ankle. Wearing boot in left leg.   Complete ROS negative except for above     May 17, 2017  Tolerating otezla better. Not throwing up anymore. Current dose 20mg  in AM and 30mg in PM (2nd week).   Patient thinks that her oral ulcers frequency and severity are improving with otezla. Also no genital ulcers in the interim.   Currently on doxycycline for bronchitis/?pneunomia. 4 more days left.      Joint pain history  2 weeks ago/ dx with pneumonia 1 week ago/  Involved joints: all over  Pain scale: 6.5/10   Wakes the patient from sleep : Yes  Morning stiffness: Yes for 120 minutes  Meds used:otezla,colchicine     Interim history  Since last visit:  1. Infections - Yes/ pneumonia  2. New symptoms/medical problem - Yes/ thinks she may have had a mini-seizure about 10 days ago ; did not seek medical attention; did not reoccur after that. Patient seeing PCP today.  3. Any side effects from Rheum medications -vomiting a lot (resolved)  3. ER visits/Hospitalizations/surgeries - Yes  4. Last PCP visit: has an apt. today     Patient still getting double vision. Floaters present.      Therapist recommended psychiatry evaluation. Patient does not want to see psychaitry. Patient will see PCP today.      August 22, 2017  Have you ever seen a rheumatologist Yes Who You When 5/17/17     NO genital ulcers in the interim.   Mouth ulcers- three in the back of the throat and roof of the mouth last month.      Joint pain history  Onset: doing alittle worse / cut her otezla back to 30mg  Daily due to GI problems  Involved joints: all over her body. Been having more black out episodes, been having more chest pains.also has raised bumps on both legs from the knees down that itch and are painful   Pain scale:  5.5/10     Wakes the patient from sleep : Yes  Morning stiffness:Yes for 120 minutes  Meds used:otezla, colchicine (three pills daily)     Interim history  Since last visit:  1. Infections - Yes stomach issue  2. New symptoms/medical problem - No  3. Any side effects from Rheum medications -nausea from otezla  3. ER visits/Hospitalizations/surgeries - Yes, ER for foot, ankle injury from  passing out and fell down the steps. Hit her head another time from passing out. Alcohol poisoning in July 4. Last PCP visit: 6/23/17 September 19, 2017  Have you ever seen a rheumatologist Yes Who You When 8/22/17  Joint pain history  Onset: pt states that she hurts everywhere for 6 days  Involved joints: all joints  Pain scale:  7/10     Wakes the patient from sleep : Yes/ not sleeping at all  Morning stiffness:Yes for 180 minutes  Meds used:colchicine, otezla, magic mouth wash, lidocaine gel  Last one week: increased joint pain; and genital ulcer  Genital lesion (dimed size) in the last one week  After botox a week ago, she had fever 101 for three days; near syncopes. Back pain; floaters  Chest pain , mouth sores, hand pain, morning pain were all better with otezla 30mg twice daily.     Interim history  Since last visit:  1. Infections - No  2. New symptoms/medical problem - No  3. Any side effects from Rheum medications -nausea from the otezla  3. ER visits/Hospitalizations/surgeries - No  4. Last PCP visit: may 2017      October 18, 2017     Have you ever seen a rheumatologist Yes Who You When 9/19/17  Joint pain history  NO mouth or genital sores in the last 4 weeks.   Medrol dosepak helped with visual symptoms but not joint pains.      Onset: pt states that she is doing better than last time with her behcet's. Today has severe stomach pains, states that she is constipated. Pt had a seizure 2 days ago. Does have a metallic taste in her mouth  Involved joints: hands , neck, shoulders  Pain scale:  9/10 from stomach pain;      Wakes the patient from sleep : Yes  Morning stiffness:Yes for 120 minutes  Meds used:cochicine , otezla 30mg twice daily     Interim history  Since last visit:  1. Infections - No  2. New symptoms/medical problem - Yes/ the cartilage in her chest is inflamed  3. Any side effects from Rheum medications -nausea from the otezla  3. ER visits/Hospitalizations/surgeries - No  4. Last PCP  visit: yesterday     January 16, 2018  Have you ever seen a rheumatologist Yes Who You When 10/18/17  Joint pain history  Onset: pt states that she was in the hospital for 5 days before maribell, they told her she had lesions in her brain in the white matter. Had blood work drawn in the ER and they told her her inflammatory markers were elevated. Was seen in the ER last week for vomiting and diarrhea, not eating. Saw Gastro. On 1/10/18. 1.5 weeks ago she had an endoscopy and colonoscopy. She has been having elbow  And hands and knee pain, loosing use of her hands. Been dropping things. Also states that she has been getting lesions up her nose  Involved joints: see above  Pain scale:  8/10     Wakes the patient from sleep : Yes  Morning stiffness:Yes for 120 minutes  Meds used:colchisine, otezla, magic mouth wash, kenolog cream, lidocaine gel     Interim history  Since last visit:  1. Infections - Yes/ had an infection in her jaw. Having sinus issues  2. New symptoms/medical problem - Yes/ states that she is having problems walking, states that she was bouncing when she was walking  3. Any side effects from Rheum medications -otezla, nausea  3. ER visits/Hospitalizations/surgeries - Yes/ see chart  4. Last PCP visit: November 2017 April 17, 2018  Have you ever seen a rheumatologist Yes Who You When 1/16/18  Joint pain history  Onset: pt states that she is not doing well. She is having increased stomach issues, only eats 2 times in 4 days unable to keep the otezla down due to vomiting . Has sleep study done in 1 week; no genital ulcers since last appointment. 6 small mouth sores since last appointment.   Involved joints: see above   Pain scale:  7/10     Wakes the patient from sleep : Yes  Morning stiffness:Yes for 60 minutes  Meds used:otezla , colchicine, diclofenac gel, magic mouthwash, lidocaine gel      Interim history  Since last visit:  1. Infections - Yes/ had a sinus infection, thinks she is getting sick  now  2. New symptoms/medical problem - Yes/ neurology sent pt to cardiology. Has a heart condition but not sure what . She is seeing a ortho. Due to knee pain   3. Any side effects from Rheum medications -nausea/vomiting from the otezla   3. ER visits/Hospitalizations/surgeries - Yes/ seen in ER   4. Last PCP visit: yesterday     July 17, 2018  Have you ever seen a rheumatologist Yes Who You When 4/17/18  Joint pain history  Onset: pt Is here for a follow-up states that she started to get lesions on her legs , face and arm. Hurts all over, lower back is very painful. Right hand and wrist area are numb  Involved joints: see above  Pain scale:  7/10   worse in the morning  Wakes the patient from sleep : Yes/ does not go to sleep till late  Morning stiffness:Yes for 4-5 hours minutes  Meds used:colchicine, otezla has  Not started otezla yet due to extreme nausea      Interim history  Since last visit:  1. Infections - Yes/ had a bacterial infection in her pelvic area was hospitalized  2. New symptoms/medical problem - Yes/ hands are swelling up. Having orthopedic issues. Was having problem with her veins sticking up, and very painful. Has happened multiply times   3. Any side effects from Rheum medications -see above  3. ER visits/Hospitalizations/surgeries - Yes/ hospital and ER for bacterial infection  4. 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 ER said may have blood clot in calf, but when did MRI, stated body may have broken it up on it's own. Elevated D-dimer  Involved joints: Knees, neck, hands  Pain scale:  6.5/10     Wakes the patient from sleep : Yes  Morning stiffness:Yes for 180 minutes  Meds used:hyoscyamine, not taking otezla. Last dose Sep. Patient did not want to take otezla with the antibiotics.      Last major mouth ulcer- aug or Sep  No  genital ulcers in the last 6 months.      Interim history  Since last visit:  1. Infections - Yes, UTI's twice a month since last visit  2. New symptoms/medical problem - No  3. Any side effects from Rheum medications -otzela causes nausea  3. ER visits/Hospitalizations/surgeries - Yes, 1/2/19 repaired some ligaments and mass taken out, also joint build up removed from a previous injury  4. Last PCP visit: 12/5/19 June 12, 2019  Have you ever seen a rheumatologist yes Who you When 1/9/19  Joint pain history  Onset: Patient is here for a follow up. A lot of infections and in and out of the hospital, who wanted her to follow up with Rheumatology again.  Involved joints: knees, legs, back, neck, shoulders, ankles  Pain scale:  6.5/10     Wakes the patient from sleep : Yes  Morning stiffness:Yes for 120 minutes  Meds used:magic mouthwash, colchicine     Interim history  Since last visit:  1. Infections - Yes, staph  2. New symptoms/medical problem - kidney failure  3. Any side effects from Rheum medications -none  3. ER visits/Hospitalizations/surgeries - Yes, 3 hospitalizations, and surgeries,  4. Last PCP visit: 6/11/19        Today 9/30/2020: New to me, establishing care. Flaring off otezla.     816   Reports worsening oral ulcers and joint pain and skin rash.     No vaginal ulcers currently. Last ones were 5 months ago.     Gets oral ulcers monthly, not today, had them last few days ago. They come up as flare up around period time. They self-resolve.     Thinks colcrys is helping but not enough, lost some benefit. No longer has diarrhea from it. the dose is 0.6 mg bid.     Came off otezla last year when she had severe staff infection, there was drug drug interaction with vancomycin. Wants to go back on it.     Skin lesions over chest are clearing up. Has skin lesions over her legs.     She is off of otezla >1 yr. She had daily vomiting and abdominal cramps initially which later resolved after 2 months.       Today, is her last day liz her health insurnaace  .     Reports pain and swelling liz hands. Drops things, lost strenghth. Veins look inflamed. Hands are swollen in AM and before bedtime. AM stiffness is 2 hours. can t tolerate ibuprofen.     Prednnisonne does not help much.     Wants to try eleve.     Had 2 blood clots over L arm, finished xraalto 4 months ago.      Was told to have severe dry eyes, hs not had eyes checked> 1 yr as was in the hospital with staff infection. systanee nd gentle eyedrops help some, sometimes gets sharp pain and and blurry vision in her eyes from dryness. No h/o uveitis.     has dry mouth, drinks water.     Gets T  F sometimes. It is sporadic.     Lost hair.     Gets intermittent CP. Had several hospital visits and admissions in the [facility] for it. lorazepam helped witch chest spasms, she does not like pain meds.     She was prescribed 0.5 mgq d prn, 10 tbs/monthss.     She gets dry cough, just started using albuterol inhaler, it helps.     No SOB.     Has gastroparesis, IBS  nd h/o severas admission for constiption, colon blockage with impaction and gets bloating. has lost follow up with GI.     She is working with  to get medical assistance to get insurance back by early next year. She filed it again.     Gets botox inj every 3 months nd they help, has to cancel 10/20 botox inj s will not have insurance. they came back yesterday.     last seizure waas last year. still gets black out spells, salts ne was last week.    derm eye gi     FHx: Both parents have RA.  SHx: She was working in a [employer] shop, it was closed because of pandemic. sexually active, not on oral contraceptives. She thinks she had a misccraaaiaage last wk, had n period x2 months then mueller bleeding a lot, dark red and was different from period. Felt something came out that she feels she miscarried, no pos pregnancy test. No smoking. rarely drinks ETOH.  [facility]   thumbs  between MCP tender   otezla helped with skin lesions, oral ulcers, joint pain.  colcrys  sjogre alondra mercedes [phone]        8/11/2022: Samara presents for urgent visit to discuss m/o Behcet's flare during pregnancy, she is   She is pregnant 10 wk 3 days with ADRIAN 3/6/2023.  In 2020, had 1st trimester miscarriage.  Has LBP, ankle pain/swelling.   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 Discussed with MFM, back on low dose otezla since last week, only 1 tab a day.    Interval History  10/28/2022  Mrs. Oropeza was last seen 08/11/22. At that time she was advised to continue otezla (1 tablet BID) and remain off colcrys. Since that time, she has instead taken otezla 1 tablet daily. She notes more joint pain in hands, wrists, cervical spine, knees with 4 hours of monring stiffness. Previously <1hr with full strength.     Had a few genital ulcers recently which only lasted a few days. Oral ulcers have been more active of late and are currently healing.     Abdominal pain on L side, pain is always worse after eating. Out of the phase of her pregnancy with morning sickness, at 22w on Monday.     She describes a few fevers at the end of September with hot flashes and cold sweats with Tmax 100. Called OB-GYN who instructed she should go in if persistent, though it resolved within a few days.     More easy bruising - thighs, she is currently on lovenox and baby aspirin. Previous history of clots especially with interventions. She recently had an issue with superficial thrombophlebitis following a blood draw.     Seeing more floaters in her vision. Sometimes tiny and clear, but can also be larger and 'gray', up to 1/4 of her vision. Has not seen Ophthalmology in many years.     More frequent migraines - did a nerve block recently but this has worn off, previous botox injections.     ADRIAN 03/06/23.    Today 1/25/23:    Baby is growing small. Had high BP yesterday,  they would monitor it. DBP was 90.    Increasing otezla to bid helped. It helped with ulcers. Has skin breakdown since Daryl, never had it like this over her chest, but still it feels related to Behcet.    Hands and ankles are painful and swoleln, ankles are new but had hand pain with behcet before,. This started fater entering [facility]. Her ADRIAN might be earlier based on baby's growth, induction at 37 wk, progress at 39. nex twk will have another check.    Still has the neck pain. Still gets eye floaters.    Review of Systems    Associated symptoms include arthralgia, fatigue, morning stiffness, nausea and oral ulcers.   Denies associated muscle weakness, new headache, nodules, palpitations, pleurisy, polyuria and rashes/photosensitive.      Past Medical History   Past Medical History:   Diagnosis Date     Anemia      Anxiety      Arthritis      Behcet's disease (H)      Cervical adenitis May 2010     Chronic abdominal pain      Constipation, chronic 1994     Fibromyalgia      Gastro-oesophageal reflux disease      Gastroparesis      Irregular heart beat     tachycardia, has had workup     Migraines      Neuromuscular disorder (H)     fibramyalgia     Palpitations      PONV (postoperative nausea and vomiting)      Seizure (H)      Seizures (H)     unknown etiology     Syncope      Tourette's       Past Surgical History:   Procedure Laterality Date     ARTHROSCOPY ANKLE, OPEN REPAIR LIGAMENT, COMBINED Left 9/25/2019    Procedure: LEFT ANKLE ARTHROSCOPY WITH LIGAMENT REPAIR;  Surgeon: Andres Johnson DPM;  Location:  OR     ARTHROSCOPY ANKLE, REPAIR LIGAMENT Left 1/2/2019    Procedure: Ankle arthroscopy and sinus tarsi evacuation, ligament repair, left lower extremity;  Surgeon: Andres Johnson DPM;  Location:  OR     ARTHROSCOPY KNEE WITH PATELLAR REALIGNMENT  7/25/2013    Procedure: ARTHROSCOPY KNEE WITH PATELLAR REALIGNMENT;  Left Knee Arthroscopy, Medial Patellofemoral Ligament Reconstruction  with Allograft  ;  Surgeon: Jennifer Acevedo MD;  Location: [facility]     COLONOSCOPY  2015     DENTAL SURGERY  1996    Teeth removal     ENDOSCOPY UPPER, COLONOSCOPY, COMBINED  2005     HC ESOPH/GAS REFLUX TEST W NASAL IMPED >1 HR N/A 2/15/2017    Procedure: ESOPHAGEAL IMPEDENCE FUNCTION TEST WITH 24 HOUR PH GREATER THAN 1 HOUR;  Surgeon: Timothy Matta MD;  Location: [facility]     IR PICC PLACEMENT > 5 YRS OF AGE  3/13/2019     IR UPPER EXTREMITY VENOGRAM LEFT  2/25/2020     IRRIGATION AND DEBRIDEMENT FOOT, COMBINED Left 3/12/2019    Procedure: COMBINED IRRIGATION AND DEBRIDEMENT LEFT ANKLE;  Surgeon: Micha Glover MD;  Location: [facility]     IRRIGATION AND DEBRIDEMENT LOWER EXTREMITY, COMBINED Left 5/7/2019    Procedure: 1.  Excision of wound down to and including deep fascia, less than 20 cm2.  2.  Irrigation and debridement, left ankle.;  Surgeon: Andres Johnson DPM;  Location: [facility] OR     PICC INSERTION Left 05/05/2019    4Fr - 45cm (5cm external), Basilic vein, SVC RA junction      Patient Active Problem List    Diagnosis Date Noted     Infection of joint of ankle (H) 05/06/2019     Priority: Medium     Cellulitis 03/09/2019     Priority: Medium     Displacement of lumbar intervertebral disc without myelopathy 11/13/2018     Priority: Medium     Overview:   Created by Conversion       Iron deficiency associated with nonfamilial restless legs syndrome 11/13/2018     Priority: Medium     Enthesopathy of hip region 11/13/2018     Priority: Medium     Overview:   Created by Conversion       Tourette's syndrome 11/13/2018     Priority: Medium     Overview:   Created by Conversion       Somatic symptom disorder 06/01/2018     Priority: Medium     Pelvic floor weakness 04/25/2018     Priority: Medium     Spells of decreased attentiveness 12/19/2017     Priority: Medium     Mobile cecum 11/09/2017     Priority: Medium     Cecum noted in Right lower quadrant on 4/17 CT scan, and in Left upper  Quadrant on CT on 11/2017.       Vitamin D deficiency 10/11/2017     Priority: Medium     How low, unknown/not found. On D when tested at 27. Starting cholecalciferol October 2017. Needs recheck.       Convulsions, unspecified convulsion type (H) 10/03/2017     Priority: Medium     Transient alteration of awareness 10/03/2017     Priority: Medium     Chronic pain syndrome 07/27/2017     Priority: Medium     Major depressive disorder, recurrent episode, moderate (H) 06/27/2017     Priority: Medium     Cervical pain 05/02/2017     Priority: Medium     Acute left ankle pain 03/31/2017     Priority: Medium     Cervical dystonia 03/28/2017     Priority: Medium     PTSD (post-traumatic stress disorder) 01/17/2017     Priority: Medium     Patellofemoral instability 10/20/2016     Priority: Medium     Fibromyalgia 08/04/2016     Priority: Medium     Rheumatoid arthritis of multiple sites without rheumatoid factor (H) 08/04/2016     Priority: Medium     Raynaud's disease without gangrene 08/04/2016     Priority: Medium     Chronic abdominal pain 08/04/2016     Priority: Medium     Palpitations 01/12/2016     Priority: Medium     On colchicine therapy 10/30/2015     Priority: Medium     Spell of shaking 05/06/2015     Priority: Medium     Migraine 02/04/2015     Priority: Medium     Behcet's disease (H) 12/10/2014     Priority: Medium     Headaches due to old head injury 11/12/2013     Priority: Medium     Milk protein intolerance 10/11/2013     Priority: Medium     Intestinal malabsorption 10/11/2013     Priority: Medium     Concussion 02/13/2013     Priority: Medium     Jan 2013, with prolonged recovery- followed by sports med         Knee pain 01/03/2013     Priority: Medium     Generalized anxiety disorder 06/25/2009     Priority: Medium     Tics - Tourette syndrome 05/18/2009     Priority: Medium     Followed by psychotherapy. Symptoms well managed. Originally diagnosed at [facility]. (Dr. Simpson)           IBS  (irritable bowel syndrome) 05/18/2009     Priority: Medium     Allergic rhinitis 05/18/2009     Priority: Medium     GERD (gastroesophageal reflux disease) 01/10/2008     Priority: Medium     Gastroparesis 1994     Priority: Medium      Family History   Problem Relation Age of Onset     Depression Mother      Neurologic Disorder Mother         Migraines, take imitrex injection.  Also in maternal grandmother.       Alcohol/Drug Father      Hypertension Father      Depression Father      Osteoarthritis Father      Cardiovascular Maternal Grandmother      Depression Maternal Grandmother      Hypertension Maternal Grandmother      Alzheimer Disease Maternal Grandmother      Cardiovascular Maternal Grandfather      Hypertension Maternal Grandfather      Depression Maternal Grandfather      Alcohol/Drug Maternal Grandfather      Diabetes Maternal Grandfather      Cardiovascular Paternal Grandmother      Hypertension Paternal Grandmother      Cardiovascular Paternal Grandfather      Hypertension Paternal Grandfather      Glaucoma No family hx of      Macular Degeneration No family hx of       Allergies   Allergen Reactions     Amoxil [Penicillins] Rash     Dad unsure of reaction.     Bee Venom Anaphylaxis     Bioflavonoids Anaphylaxis     Citrus Anaphylaxis     All [place]     Contrast Dye Rash     Contrast Media Ready-Box [facility] 04/09/2014.; Contrast Media Ready-Box [facility], 04/09/2014.  NOTE: this is a contrast media oral with iodine. Premedicate with methylpred standard for IV contrast, request barium contrast for oral contrast.     Diagnostic X-Ray Materials Hives and Rash     Contrast Media Ready-Box [facility], 04/09/2014.; Contrast Media Ready-Box [facility], 04/09/2014.  NOTE: this is a contrast media oral with iodine. Premedicate with methylpred standard for IV contrast, request barium contrast for oral contrast.     Pineapple Anaphylaxis, Difficulty breathing and Rash     Reglan [Metoclopramide] Other (See Comments)     IV  dose only, in ER, rapid heart rate.     Ace Inhibitors      Difficulty in breathing and GI upset     Amitiza [Lubiprostone] Nausea and Vomiting     Amoxicillin-Pot Clavulanate      Midazolam Unknown     parent states that when pt takes this medication, she wakes up being very violent .     Other [No Clinical Screening - See Comments]      Bleech/ chest tightness, itchy throat, swollen tongue, hives     Tizanidine Other (See Comments)     Confusion, back pain, photophobia, abdominal pain, shaking, anxious       Versed      Coming out of pelvic exam at age of 5, was kicking and screaming when coming out of the versed.     Adhesive Tape Rash     Azithromycin Hives and Rash     Cephalexin Itching and Rash     Sulfa Drugs Rash     Skin scarring      Current Outpatient Medications   Medication Sig Dispense Refill     albuterol (PROAIR HFA/PROVENTIL HFA/VENTOLIN HFA) 108 (90 Base) MCG/ACT inhaler Inhale 2 puffs into the lungs every 6 hours as needed for shortness of breath / dyspnea or wheezing 1 Inhaler 1     amitriptyline (ELAVIL) 25 MG tablet TAKE 1 TABLET BY MOUTH EVERY NIGHT AT BEDTIME 90 tablet 1     apremilast (OTEZLA) 30 MG tablet Take 1 tablet (30 mg) by mouth 2 times daily HOLD FOR SIGNS OF INFECTION, AND SEEK MEDICAL ATTENTION. 180 tablet 1     artificial tears OINT ophthalmic ointment 0.5 inch strip each eye at night 1 Tube 11     aspirin (ASA) 81 MG chewable tablet        benzocaine (TOPICALE XTRA) 20 % GEL Apply as needed locally to mouth or nasal ulcers for pain; 4 times daily as needed 30 g 1     betamethasone valerate (VALISONE) 0.1 % cream Apply topically 2 times daily as needed        bisacodyl (DULCOLAX) 5 MG EC tablet Take 2 tablets (10 mg) by mouth daily as needed for constipation 30 tablet 0     citalopram (CELEXA) 20 MG tablet Take 1 tablet (20 mg) by mouth daily 90 tablet 1     dexamethasone (DECADRON) 4 MG/ML injection To be used by therapist during PT sessions. 30 mL 0     diclofenac (VOLTAREN)  75 MG EC tablet Take 1 tablet (75 mg) by mouth 2 times daily as needed for moderate pain 60 tablet 1     dicyclomine (BENTYL) 20 MG tablet Take 1 tablet (20 mg) by mouth 4 times daily as needed 120 tablet 3     diphenhydrAMINE (BENADRYL) 25 MG tablet Take 1 tablet (25 mg) by mouth 3 times daily as needed (itching) 40 tablet 1     enoxaparin ANTICOAGULANT (LOVENOX) 40 MG/0.4ML syringe        EPINEPHrine (EPIPEN 2-EAMON) 0.3 MG/0.3ML injection Inject 0.3 mLs (0.3 mg) into the muscle as needed for anaphylaxis 0.6 mL 3     gabapentin (NEURONTIN) 300 MG capsule TAKE ONE CAPSULE BY MOUTH THREE TIMES DAILY  90 capsule 0     hydrOXYzine (ATARAX) 25 MG tablet TAKE ONE OR TWO TABLETS BY MOUTH EVERY SIX HOURS AS NEEDED       hypromellose (GENTEAL) 0.3 % SOLN 1 drop every hour as needed for dry eyes        lidocaine (LMX4) 4 % external cream Apply topically once as needed for mild pain 120 g 1     LINZESS 290 MCG capsule TAKE 1 CAPSULE BY MOUTH EVERY DAY IN THE MORNING BEFORE BREAKFAST 90 capsule 0     LORazepam (ATIVAN) 0.5 MG tablet TAKE 1 TABLET BY MOUTH EVERY 6 HOURS AS NEEDED FOR ANXIETY 10 tablet 0     ondansetron (ZOFRAN-ODT) 8 MG ODT tab Take 1 tablet (8 mg) by mouth every 8 hours as needed for nausea 180 tablet 1     order for DME Equipment being ordered: Trilok brace 8 1/2 left lower extremity 1 Device 0     order for DME Equipment being ordered: size 8 1/2 walking boot tall 1 Device 0     polyethylene glycol (MIRALAX/GLYCOLAX) powder Take 1 capful by mouth 3 times daily       Prenatal MV-Min-Fe Fum-FA-DHA (PRENATAL 1 PO)        rizatriptan (MAXALT) 5 MG tablet Take 1 tablet (5 mg) by mouth at onset of headache for migraine May repeat in 2 hours. Max 6 tablets/24 hours. 10 tablet 1     sodium fluoride 1.1 % CREA Apply 1 Application topically daily       sucralfate (CARAFATE) 1 GM/10ML suspension Take 10 mLs (1 g) by mouth 4 times daily 1200 mL 2     triamcinolone (KENALOG) 0.1 % external ointment Apply twice daily as  needed to lesions on the genitals and body. OK to use very sparingly on the face. 454 g 2     Apremilast (OTEZLA) 10 & 20 & 30 MG TBPK Take by mouth according to the instructions on the packet. Hold for signs of infection,any GI upset, weight loss or depression concerns, and seek medical attention. (Patient not taking: Reported on 12/6/2022) 1 each 0     furosemide (LASIX) 40 MG tablet TAKE ONE TABLET BY MOUTH TWICE DAILY  (Patient not taking: Reported on 12/6/2022) 60 tablet 0     lactulose 20 GM/30ML SOLN Take 30 mLs by mouth 3 times daily as needed (for constipation) (Patient not taking: Reported on 12/6/2022) 300 mL 3     riboflavin 100 MG CAPS Take 2 capsules by mouth daily (Patient not taking: Reported on 1/25/2023)       triamcinolone (KENALOG) 0.1 % paste Take by mouth 2 times daily (Patient not taking: Reported on 12/6/2022) 5 g 5     triamcinolone (KENALOG) 0.5 % external ointment Apply 1 g topically 3 times daily as needed for irritation (Patient not taking: Reported on 1/25/2023) 15 g 3           Physical Exam     Constitutional:       Appearance: NAD, pleasant    Eyes: nl sclera, conj    Musculoskeletal:      No synovitis  Skin:  No rash.   Neurological:   Non focal    Psych: nl affect        There is currently no information documented on the homunculus. Go to the Rheumatology activity and complete the homunculus joint exam.  Joint Exam 01/25/2023     No joint exam has been documented for this visit          Data   Data      10/28/2022   BEAUCHAMP-28 (ESR) --   BEAUCHAMP-28 (CRP) --   Tender (BEAUCHAMP-28) 2 / 28    Swollen (BEAUCHAMP-28) 0 / 28    Provider Global --   Patient Global --   ESR 17 mm/hr   CRP --     No results found for any visits on 01/25/23.  CBC RESULTS: Recent Labs   Lab Test 09/07/20  1147   WBC 4.1   RBC 5.09   HGB 13.0   HCT 43.0   MCV 85   MCH 25.5*   MCHC 30.2*   RDW 15.0        TSH   Date Value Ref Range Status   09/07/2020 0.77 0.40 - 4.00 mU/L Final   03/21/2019 0.53 0.40 - 4.00 mU/L Final    10/30/2018 1.06 0.40 - 4.00 mU/L Final   11/26/2016 0.87 0.40 - 4.00 mU/L Final     T4 Free   Date Value Ref Range Status   01/31/2007 1.26 0.70 - 1.85 ng/dL Final     Rheumatoid Factor   Date Value Ref Range Status   09/30/2020 <7 <12 IU/mL Final   05/06/2016 <20 <20 IU/mL Final       Reviewed Rheumatology lab flowsheet    Dominga Sosa MD

## 2023-01-25 NOTE — PROGRESS NOTES
Samara is a 27 year old who is being evaluated via a billable video visit.      How would you like to obtain your AVS? MyChart  If the video visit is dropped, the invitation should be resent by: Text to cell phone: [phone]  Will anyone else be joining your video visit? No      Will be joined today by boyfriend Avi.    Angela De Santiago on 2023 at 2:21 PM      Video-Visit Details    Type of service:  Video Visit   Video Start Time: 2:31 PM  Video End Time:2:44 PM    Originating Location (pt. Location): Home    Distant Location (provider location):  On-site  Platform used for Video Visit: [facility]       Rheumatology Clinic Return Visit Patient     Samara Oropeza MRN# [id number]   YOB: 1995 Age: 27 year old     Date of Visit: 2023  Primary care provider: [facility]          Assessment & Plan    Assessment & Plan   Samara is a 27 year old  female (ADRIAN 22) with Behçet's disease (pathergy, oral/genital ulcers, polyarthralgia) who presents today for Follow Up (3 month)  .    # Behçet's disease (pathergy, arthralgias, recurrent oral / genital ulcers)  # High-risk pregnancy (ADRIAN 23)    10/2022:  Mrs. Oropeza arrives for follow-up of her Behçet's disease that is more active at present with recent decrease of her Otezla (60mg --> 30mg) recommended by [facility]. Previous discussion with her OB providers had been to decrease to the lowest effective dose, clearly 30mg is not effective as she describes worsened oral ulcers, genital ulcers, arthralgias, abdominal pain, and thrombophlebitis. Although there is not a great deal of evidence for Otezla safety in pregnancy, its mechanism of action would suggest it. Counterbalancing these concerns are what we understand of Behçet's in pregnancy which more often improves but in some cases (~29%) becomes worse, and can flare more often in the 1st trimester and post- period.  She also describes some vision  changes, which in the context of Behçet's is conerning. She has not seen Ophthalmology in many years; I will place a referral today.     Today 1/25/2023:    Continue otezla 30mg BID      Plan:    Return video visit in about 3-4 months    Please schedule eye exam    Dominga Sosa MD              Medical History   History of Present Illness   Samara Oropeza is a 27 year old female who presents for follow-up of her Behçet's in the context of pregnancy.      [place]. Marilyn [place] [facility] [place]    Original presentation 2014:     Ms Oropeza is a 19 y.o. female who presents with one year of presentation of painful oral ulcers (monthly) once that have worsened with two episodes in the last two months that presented with painful vulvar or vaginal ulcers (2 episodes so far every month) [not sure if they leave scar] as well that timing coincides with menses ([facility] [place]: 8/25/14).   ORAL ULCERS: last two episodes were severe, painful, white base with erythematous halo, that appear and disappear in the matter of 1-2 weeks without, does not bleed and doesn't respond to any treatment used (magic mouthwash), 10-15 in number with the biggest one being dime size.      VAGINAL ULCERS: 2-3 in number between labia majora and minora that occur simultaneously with oral ulcers, painful, non bleeding ulcers that are erythematous, no pus.      FOLLICULITIS: patient reports having spots in legs specially around ankle and knee, but arms, and neck are affected as well. Small lesions that resemble ingrown hair, most are red erythematous elevated lesions, well demarcated, some with liquid that patient refers as pimple like.      SKIN RASHES: welts and red macules with well defined borders that are pruritic and non-ulcerative on chest, arms and back. Benadryl relieves.      ARTHRALGIAS: In descending order of severity: hips, knees, wrists, shoulders, neck, lumbar, fingers.   C/o morning stiffness in hips, back and neck lasting for about  until noon.      Ms. Oropeza reports they present in severe flares and never fully resolve, but do get better. She has seen some swelling and warmth on the affected joints but has not noticed and redness. Has tried Tylenol, ibuprofen, and hydrocodone with not much benefit.      FEVERS: Reports 3-4 sporadic episodes per month of self limited fevers of 38 C that occur during the evenings and associate facial flushing.      HEADACHES: occur daily and are bitemporal and irradiate occipitally, pulsatile in nature, intensity varies from intense to mild, are worsened with movement. On treatment with ibuprofen and somatriptan without any positive results.      VISION CHANGES: Patient reports that suddenly she would only see a gray blotch in her right eyes and would persist like this for a day and a half. Also reports blurriness in her left eye. Presence of pain and burning sensation of eyeball with associated redness. Has changed prescription glasses in the matter of 2 years 3 times. She has had opthalmology exam and was not suggestive of any evidence of uveitis.   She was also evaluated in ED for a dizzy spell.      ABD PAIN W/ INTERMITTENT DIARRHEA AND CONSTIPATION: Patient reports abdominal pain that is intermittent, periods of 7 days without bowel movement and feeling bloated with change of pattern to diarrhea (liquidy without blood nor mucus, non foul smelling). Has taken Bentyl, Zegrid, and rinetidine with mild clinical improvement.  She also reports small red nodules after each needle stick for blood draw.   Neurology saw her back then and was not impressed with her presentation as physical examination was normal. Also MRI brain normal: Findings: No definite hemorrhage, mass affect, midline shift, or ventriculomegaly is noted.There are a few scattered non specific white matter T2 hyperintensities. Axial diffusion weighted images are unremarkable.     The major vascular structures appear patent. There is opacification  and severe mucosal thickening in the right maxillary sinus. The remaining paranasal sinuses, and mastoid air cells are clear. Orbits are unremarkable  She has + HSV-1 IGG. Seen ID. Negative for Herpes simplex virus culture. HSV IGM I/II COMBINATION SENT TO [facility]  RESULTS = <0.91  REF RANGE: <0.91 = NEGATIVE  ID did not think HSV could explain her mouth and genital sores.      CRP within normal limits. HIV negative. CBC within normal limits; UA negative. Creatinine within normal limits   CYNDIE neg.  Antigliadin neg.   ANti TTG neg.   [facility] 2014: Colonoscopy and endoscopy neg; result not available. Not sure if biopsies were done.   Raynaud's phenomenon:  Onset: about 2013  Digits: all fingers  Digital ulcers: no  Color changes: white ---> purple and red  Duration of an attack: About couple of hours per mom  Pain during attack: not clear pain but bruise like feeling  Frequency of attacks: few times a month  Family history of Raynauds: no  GERD: very long time     No hemoptysis.   No miscarriages/ no thrombosis in past.   No family or personal history of psoriasis, ulcerative colitis or chron's disease.   No buttock pain or low back pain or stiffness in AM     Interim history:  Dec 2014- Multiple mouth ulcers and did not eat for 5 days. This coincided with periods. Colchicine 0.6mg PO BID started in Nov 2014.   Prednisone taper in Dec 20mg to 0 in 4 weeks. She also used magic mouthwash. Kenalog cream. After going to the ER, 3 days later her ulcers were better. She thinks it improved after the menstruation stopped.   LMP 3 weeks ago.   COLCHICINE HAS HELPED A LOT REDUCING THE INTENSITY OF MENSTRUATION ASSOCIATED ORAL AND VULVAR ULCERS.      Interim history: 6/15     Since last visit only two episodes of mouth sores- small sized 6   No genital ulcers since last visit.   Colchicine 1.2 mg in AM and 0.6mg in PM keeps her symptoms under control.      Admitted in 5/15:  Admission Diagnoses:  - LUE weakness  - spell  - hx of  "migraines  - hx of Tourette syndrome  - hx of Behcet's disease     Discharge Diagnoses:   - spell likely 2/2 anxiety  - hx of migraines  - hx of Tourette syndrome  - hx of Behcet's disease     CT and MRI brain was normal.      Seeing Dr. Lilliana Miramontes soon as outpatient.      Dr. Garcia did not find any intraocular inflammation.       Interm 10/30/2015  ED for migraine. Continues to see neuro - MRI brain without lesions noted. Saw GI - upper barium normal. May be considering repeat colo. Worsening lesions in mouth and genitals. Has had continuous lesion in mouth x 2 months. 2 genital ulcers. Had fracture of right sesamoid in foot. Continues to have low grade fevers. New folliculitis on chest, legs and arms.      Interim hx: 1/11/2016    Pt states that since last visit she continues to have oral ulcers and has noted more folliculitis-like lesions on her lower extremities.  She states that on her right lower leg she had a red macular spot that has since resolved.  She denies uveitis.  She states that the colchicine initially helped but then stopped working.  She takes 0.6 mg TID.  She stopped taking her prednisone last week as she feels like this hasn't helped.  She hasn't had any episodes of vaginal ulcers.      She saw GI who stated she has gastroparesis.  She is seeing Cardiology later this week for possible syncopal episodes.       Interim history 5/16  Mouth ulcers X 1 last month  Genital ulcers - none since last visit.      Bilateral MCPs pain, knee pain and low back pain +ve increased since last visit  Morning stiffness X 2 hours (increasing)   5-6/10     \"overall myalgia\" has gotten worse    C/o pseudofolliculitis lesion on anterior shins bilaterally worse     Interim history: (7/18/2016)  Patient has just started Humira for 2 doses on 7/1 and 7/15 and reported minimal improvement of her hip pain but otherwise, did not notice anything different.      She reported painful mouth ulcer once a month since " last visit but noticed that it has been getting deeper.   Denied any genital ulcers.     Still has ongoing bilateral MCPs pain, knee pain but this has been intermittent and she did not have any much pain today. She reported 2-3 hours morning stiffness of both hands and pain score of 6/10 at her knees and 8/10 of her hands but currently takes no pain medication except prn ativan which she takes when her muscle is really tight.      The only concern is that she gained weight even though she has not been eating much (eat once a day) and do exercises every day for 1 hour (with video of yoga, pilates). Her mother wonders if she needs to have some labs work up include sex hormone, thyroid, cortisol.     Interval history 9/14/16  Patient was started Humira at the last visit, patient reports worsening of skin ulcers since starting Humira and stopped taking Humura for the past 2 weeks. Patient reports oral and genital ulcers has been stable even before starting Humira and has not had any interval oral/genital ulcers. However she reports recurrent skin ulcers occurring on a daily basis that affects her chest and anterior legs. Patient also has stopped taking prednisone, she reports that she doesn't feel the prednisone helps, her last dose of prednisone was 6+ months ago. Patient also report worsening low back pain that sometimes causes her to limp.     In the interval patient also visited ED on 8/31/16 for left hand pain and what the patient describes as phlebitis, no medication or procedure was done and the patient was discharged with a splint. Patient also reported 1 episode of chest pressure that was associated with dyspnea and numbness of the left arm, she was shopping in a supermarket at the time of onset, and the pressure resolved after she took a nap.     Patient denies any infectious symptoms in the interval, no fever, chills, night sweat, cough, dysuria, denies eye pain or visual changes.     November 4, 2016  Oct -  had one genital ulcer  Oral ulcers X 5- around menstruation time.   Knee pain- chronic on the left side  Dizziness spells - on and off; been to the ER for that in the past.   On and off migraines  July 2013 - s/p MPFL reconstruction plus LRL 7/2013  Morning stiffness in knee (left) X 1 hour     Severe jaw pain and chest pain- patient wondering if this is Tourette's or esophageal spasms. Cardiac workup negative.   Fever      January 13, 2017  Yesterday in ER [facility] with orthostatic syncope from dehydration.   Chest pain on and off still continues. Painful with deep breaths.   Oral ulcers - few minor ones lasting for one week;   One major one in roof of mouth lasting for about one week.   Knee pain +ve - reviewed the recent MRI with her orthopedic surgeon.   On and off migraines  otezla is approved. Still waiting to receive the meds.      March 31, 2017  Otezla current dose 15mg PO BID.   She is tolerating okay at this dose and is willing to increase the dose further up to 30mg BID.   Her joint pains are better on otezla. Knee pain is also better.   Back and neck pain +ve 8/10 when it is worse. Intramuscular botox injections were given on Monday in PMR.   2 minor genital ulcers in the interim- resolved.   Few oral ulcers in the interim- resolved.   Nasal ulcer - resolved.   Migraines on and off.   Nausea with otezla but improving.   Dizziness and blackouts on and off. She fell once and hurt her ankle. Wearing boot in left leg.   Complete ROS negative except for above     May 17, 2017  Tolerating otezla better. Not throwing up anymore. Current dose 20mg in AM and 30mg in PM (2nd week).   Patient thinks that her oral ulcers frequency and severity are improving with otezla. Also no genital ulcers in the interim.   Currently on doxycycline for bronchitis/?pneunomia. 4 more days left.      Joint pain history  2 weeks ago/ dx with pneumonia 1 week ago/  Involved joints: all over  Pain scale: 6.5/10   Wakes the patient from  sleep : Yes  Morning stiffness: Yes for 120 minutes  Meds used:otezla,colchicine     Interim history  Since last visit:  1. Infections - Yes/ pneumonia  2. New symptoms/medical problem - Yes/ thinks she may have had a mini-seizure about 10 days ago ; did not seek medical attention; did not reoccur after that. Patient seeing PCP today.  3. Any side effects from Rheum medications -vomiting a lot (resolved)  3. ER visits/Hospitalizations/surgeries - Yes  4. Last PCP visit: has an apt. today     Patient still getting double vision. Floaters present.      Therapist recommended psychiatry evaluation. Patient does not want to see psychaitry. Patient will see PCP today.      August 22, 2017  Have you ever seen a rheumatologist Yes Who You When 5/17/17     NO genital ulcers in the interim.   Mouth ulcers- three in the back of the throat and roof of the mouth last month.      Joint pain history  Onset: doing alittle worse / cut her otezla back to 30mg  Daily due to GI problems  Involved joints: all over her body. Been having more black out episodes, been having more chest pains.also has raised bumps on both legs from the knees down that itch and are painful   Pain scale:  5.5/10     Wakes the patient from sleep : Yes  Morning stiffness:Yes for 120 minutes  Meds used:otezla, colchicine (three pills daily)     Interim history  Since last visit:  1. Infections - Yes stomach issue  2. New symptoms/medical problem - No  3. Any side effects from Rheum medications -nausea from otezla  3. ER visits/Hospitalizations/surgeries - Yes, ER for foot, ankle injury from passing out and fell down the steps. Hit her head another time from passing out. Alcohol poisoning in July 4. Last PCP visit: 6/23/17 September 19, 2017  Have you ever seen a rheumatologist Yes Who You When 8/22/17  Joint pain history  Onset: pt states that she hurts everywhere for 6 days  Involved joints: all joints  Pain scale:  7/10     Wakes the patient from sleep :  Yes/ not sleeping at all  Morning stiffness:Yes for 180 minutes  Meds used:colchicine, otezla, magic mouth wash, lidocaine gel  Last one week: increased joint pain; and genital ulcer  Genital lesion (dimed size) in the last one week  After botox a week ago, she had fever 101 for three days; near syncopes. Back pain; floaters  Chest pain , mouth sores, hand pain, morning pain were all better with otezla 30mg twice daily.     Interim history  Since last visit:  1. Infections - No  2. New symptoms/medical problem - No  3. Any side effects from Rheum medications -nausea from the otezla  3. ER visits/Hospitalizations/surgeries - No  4. Last PCP visit: may 2017      October 18, 2017     Have you ever seen a rheumatologist Yes Who You When 9/19/17  Joint pain history  NO mouth or genital sores in the last 4 weeks.   Medrol dosepak helped with visual symptoms but not joint pains.      Onset: pt states that she is doing better than last time with her behcet's. Today has severe stomach pains, states that she is constipated. Pt had a seizure 2 days ago. Does have a metallic taste in her mouth  Involved joints: hands , neck, shoulders  Pain scale:  9/10 from stomach pain;      Wakes the patient from sleep : Yes  Morning stiffness:Yes for 120 minutes  Meds used:cochicine , otezla 30mg twice daily     Interim history  Since last visit:  1. Infections - No  2. New symptoms/medical problem - Yes/ the cartilage in her chest is inflamed  3. Any side effects from Rheum medications -nausea from the otezla  3. ER visits/Hospitalizations/surgeries - No  4. Last PCP visit: yesterday     January 16, 2018  Have you ever seen a rheumatologist Yes Who You When 10/18/17  Joint pain history  Onset: pt states that she was in the hospital for 5 days before maribell, they told her she had lesions in her brain in the white matter. Had blood work drawn in the ER and they told her her inflammatory markers were elevated. Was seen in the ER last week  for vomiting and diarrhea, not eating. Saw Gastro. On 1/10/18. 1.5 weeks ago she had an endoscopy and colonoscopy. She has been having elbow  And hands and knee pain, loosing use of her hands. Been dropping things. Also states that she has been getting lesions up her nose  Involved joints: see above  Pain scale:  8/10     Wakes the patient from sleep : Yes  Morning stiffness:Yes for 120 minutes  Meds used:colchisine, otezla, magic mouth wash, kenolog cream, lidocaine gel     Interim history  Since last visit:  1. Infections - Yes/ had an infection in her jaw. Having sinus issues  2. New symptoms/medical problem - Yes/ states that she is having problems walking, states that she was bouncing when she was walking  3. Any side effects from Rheum medications -otezla, nausea  3. ER visits/Hospitalizations/surgeries - Yes/ see chart  4. Last PCP visit: November 2017 April 17, 2018  Have you ever seen a rheumatologist Yes Who You When 1/16/18  Joint pain history  Onset: pt states that she is not doing well. She is having increased stomach issues, only eats 2 times in 4 days unable to keep the otezla down due to vomiting . Has sleep study done in 1 week; no genital ulcers since last appointment. 6 small mouth sores since last appointment.   Involved joints: see above   Pain scale:  7/10     Wakes the patient from sleep : Yes  Morning stiffness:Yes for 60 minutes  Meds used:otezla , colchicine, diclofenac gel, magic mouthwash, lidocaine gel      Interim history  Since last visit:  1. Infections - Yes/ had a sinus infection, thinks she is getting sick now  2. New symptoms/medical problem - Yes/ neurology sent pt to cardiology. Has a heart condition but not sure what . She is seeing a ortho. Due to knee pain   3. Any side effects from Rheum medications -nausea/vomiting from the otezla   3. ER visits/Hospitalizations/surgeries - Yes/ seen in ER   4. Last PCP visit: yesterday     July 17, 2018  Have you ever seen a  rheumatologist Yes Who You When 4/17/18  Joint pain history  Onset: pt Is here for a follow-up states that she started to get lesions on her legs , face and arm. Hurts all over, lower back is very painful. Right hand and wrist area are numb  Involved joints: see above  Pain scale:  7/10   worse in the morning  Wakes the patient from sleep : Yes/ does not go to sleep till late  Morning stiffness:Yes for 4-5 hours minutes  Meds used:colchicine, otezla has  Not started otezla yet due to extreme nausea      Interim history  Since last visit:  1. Infections - Yes/ had a bacterial infection in her pelvic area was hospitalized  2. New symptoms/medical problem - Yes/ hands are swelling up. Having orthopedic issues. Was having problem with her veins sticking up, and very painful. Has happened multiply times   3. Any side effects from Rheum medications -see above  3. ER visits/Hospitalizations/surgeries - Yes/ hospital and ER for bacterial infection  4. 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 ER said may have blood clot in calf, but when did MRI, stated body may have broken it up on it's own. Elevated D-dimer  Involved joints: Knees, neck, hands  Pain scale:  6.5/10     Wakes the patient from sleep : Yes  Morning stiffness:Yes for 180 minutes  Meds used:hyoscyamine, not taking otezla. Last dose Sep. Patient did not want to take otezla with the antibiotics.      Last major mouth ulcer- aug or Sep  No genital ulcers in the last 6 months.      Interim history  Since last visit:  1. Infections - Yes, UTI's twice a month since last visit  2. New symptoms/medical problem - No  3. Any side effects from Rheum medications -otzela causes nausea  3. ER visits/Hospitalizations/surgeries - Yes, 1/2/19 repaired some ligaments and mass taken out, also joint build up removed from  a previous injury  4. Last PCP visit: 12/5/19 June 12, 2019  Have you ever seen a rheumatologist yes Who you When 1/9/19  Joint pain history  Onset: Patient is here for a follow up. A lot of infections and in and out of the hospital, who wanted her to follow up with Rheumatology again.  Involved joints: knees, legs, back, neck, shoulders, ankles  Pain scale:  6.5/10     Wakes the patient from sleep : Yes  Morning stiffness:Yes for 120 minutes  Meds used:magic mouthwash, colchicine     Interim history  Since last visit:  1. Infections - Yes, staph  2. New symptoms/medical problem - kidney failure  3. Any side effects from Rheum medications -none  3. ER visits/Hospitalizations/surgeries - Yes, 3 hospitalizations, and surgeries,  4. Last PCP visit: 6/11/19        Today 9/30/2020: New to me, establishing care. Flaring off otezla.     816   Reports worsening oral ulcers and joint pain and skin rash.     No vaginal ulcers currently. Last ones were 5 months ago.     Gets oral ulcers monthly, not today, had them last few days ago. They come up as flare up around period time. They self-resolve.     Thinks colcrys is helping but not enough, lost some benefit. No longer has diarrhea from it. the dose is 0.6 mg bid.     Came off otezla last year when she had severe staff infection, there was drug drug interaction with vancomycin. Wants to go back on it.     Skin lesions over chest are clearing up. Has skin lesions over her legs.     She is off of otezla >1 yr. She had daily vomiting and abdominal cramps initially which later resolved after 2 months.      Today, is her last day liz her health insurnaace  .     Reports pain and swelling liz hands. Drops things, lost strenghth. Veins look inflamed. Hands are swollen in AM and before bedtime. AM stiffness is 2 hours. can t tolerate ibuprofen.     Prednnisonne does not help much.     Wants to try eleve.     Had 2 blood clots over L arm, finished xraalto 4 months ago.      Was  told to have severe dry eyes, hs not had eyes checked> 1 yr as was in the hospital with staff infection. systanee nd gentle eyedrops help some, sometimes gets sharp pain and and blurry vision in her eyes from dryness. No h/o uveitis.     has dry mouth, drinks water.     Gets T  F sometimes. It is sporadic.     Lost hair.     Gets intermittent CP. Had several hospital visits and admissions in the [facility] for it. lorazepam helped witch chest spasms, she does not like pain meds.     She was prescribed 0.5 mgq d prn, 10 tbs/monthss.     She gets dry cough, just started using albuterol inhaler, it helps.     No SOB.     Has gastroparesis, IBS  nd h/o severas admission for constiption, colon blockage with impaction and gets bloating. has lost follow up with GI.     She is working with  to get medical assistance to get insurance back by early next year. She filed it again.     Gets botox inj every 3 months nd they help, has to cancel 10/20 botox inj s will not have insurance. they came back yesterday.     last seizure waas last year. still gets black out spells, salts ne was last week.    derm eye gi     FHx: Both parents have RA.  SHx: She was working in a [employer] shop, it was closed because of pandemic. sexually active, not on oral contraceptives. She thinks she had a misccraaaiaage last wk, had n period x2 months then mueller bleeding a lot, dark red and was different from period. Felt something came out that she feels she miscarried, no pos pregnancy test. No smoking. rarely drinks ETOH.  [facility]   thumbs between MCP tender   otezla helped with skin lesions, oral ulcers, joint pain.  colcrys  sjogre aleve biotene voltaren [id number]        8/11/2022: Samara presents for urgent visit to discuss m/o Behcet's flare during pregnancy, she is   She is pregnant 10 wk 3 days with ADRIAN 3/6/2023.  In 2020, had 1st trimester miscarriage.  Has LBP, ankle pain/swelling.   When she found out to be  pregnant, stopped otezla and colcrys but started to flare with Behcet's. Discussed with MFM, back on low dose otezla since last week, only 1 tab a day.    Interval History  10/28/2022  Mrs. Oropeza was last seen 08/11/22. At that time she was advised to continue otezla (1 tablet BID) and remain off colcrys. Since that time, she has instead taken otezla 1 tablet daily. She notes more joint pain in hands, wrists, cervical spine, knees with 4 hours of monring stiffness. Previously <1hr with full strength.     Had a few genital ulcers recently which only lasted a few days. Oral ulcers have been more active of late and are currently healing.     Abdominal pain on L side, pain is always worse after eating. Out of the phase of her pregnancy with morning sickness, at 22w on Monday.     She describes a few fevers at the end of September with hot flashes and cold sweats with Tmax 100. Called OB-GYN who instructed she should go in if persistent, though it resolved within a few days.     More easy bruising - thighs, she is currently on lovenox and baby aspirin. Previous history of clots especially with interventions. She recently had an issue with superficial thrombophlebitis following a blood draw.     Seeing more floaters in her vision. Sometimes tiny and clear, but can also be larger and 'gray', up to 1/4 of her vision. Has not seen Ophthalmology in many years.     More frequent migraines - did a nerve block recently but this has worn off, previous botox injections.     ADRIAN 03/06/23.    Today 1/25/23:    Baby is growing small. Had high BP yesterday, they would monitor it. DBP was 90.    Increasing otezla to bid helped. It helped with ulcers. Has skin breakdown since Daryl, never had it like this over her chest, but still it feels related to Behcet.    Hands and ankles are painful and swoleln, ankles are new but had hand pain with behcet before,. This started fater entering [facility]. Her ADRIAN might be earlier based on  baby's growth, induction at 37 wk, progress at 39. nex twk will have another check.    Still has the neck pain. Still gets eye floaters.    Review of Systems    Associated symptoms include arthralgia, fatigue, morning stiffness, nausea and oral ulcers.   Denies associated muscle weakness, new headache, nodules, palpitations, pleurisy, polyuria and rashes/photosensitive.      Past Medical History   Past Medical History:   Diagnosis Date     Anemia      Anxiety      Arthritis      Behcet's disease (H)      Cervical adenitis May 2010     Chronic abdominal pain      Constipation, chronic 1994     Fibromyalgia      Gastro-oesophageal reflux disease      Gastroparesis      Irregular heart beat     tachycardia, has had workup     Migraines      Neuromuscular disorder (H)     fibramyalgia     Palpitations      PONV (postoperative nausea and vomiting)      Seizure (H)      Seizures (H)     unknown etiology     Syncope      Tourette's       Past Surgical History:   Procedure Laterality Date     ARTHROSCOPY ANKLE, OPEN REPAIR LIGAMENT, COMBINED Left 9/25/2019    Procedure: LEFT ANKLE ARTHROSCOPY WITH LIGAMENT REPAIR;  Surgeon: Andres Johnson DPM;  Location:  OR     ARTHROSCOPY ANKLE, REPAIR LIGAMENT Left 1/2/2019    Procedure: Ankle arthroscopy and sinus tarsi evacuation, ligament repair, left lower extremity;  Surgeon: Andres Johnson DPM;  Location:  OR     ARTHROSCOPY KNEE WITH PATELLAR REALIGNMENT  7/25/2013    Procedure: ARTHROSCOPY KNEE WITH PATELLAR REALIGNMENT;  Left Knee Arthroscopy, Medial Patellofemoral Ligament Reconstruction with Allograft  ;  Surgeon: Jennifer Acevedo MD;  Location:  OR     COLONOSCOPY  2015     DENTAL SURGERY  1996    Teeth removal     ENDOSCOPY UPPER, COLONOSCOPY, COMBINED  2005     HC ESOPH/GAS REFLUX TEST W NASAL IMPED >1 HR N/A 2/15/2017    Procedure: ESOPHAGEAL IMPEDENCE FUNCTION TEST WITH 24 HOUR PH GREATER THAN 1 HOUR;  Surgeon: Timothy Matta MD;  Location:   GI     IR PICC PLACEMENT > 5 YRS OF AGE  3/13/2019     IR UPPER EXTREMITY VENOGRAM LEFT  2/25/2020     IRRIGATION AND DEBRIDEMENT FOOT, COMBINED Left 3/12/2019    Procedure: COMBINED IRRIGATION AND DEBRIDEMENT LEFT ANKLE;  Surgeon: Micha Glover MD;  Location: [facility] OR     IRRIGATION AND DEBRIDEMENT LOWER EXTREMITY, COMBINED Left 5/7/2019    Procedure: 1.  Excision of wound down to and including deep fascia, less than 20 cm2.  2.  Irrigation and debridement, left ankle.;  Surgeon: Andres Johnson DPM;  Location: [facility] OR     PICC INSERTION Left 05/05/2019    4Fr - 45cm (5cm external), Basilic vein, SVC RA junction      Patient Active Problem List    Diagnosis Date Noted     Infection of joint of ankle (H) 05/06/2019     Priority: Medium     Cellulitis 03/09/2019     Priority: Medium     Displacement of lumbar intervertebral disc without myelopathy 11/13/2018     Priority: Medium     Overview:   Created by Conversion       Iron deficiency associated with nonfamilial restless legs syndrome 11/13/2018     Priority: Medium     Enthesopathy of hip region 11/13/2018     Priority: Medium     Overview:   Created by Conversion       Tourette's syndrome 11/13/2018     Priority: Medium     Overview:   Created by Conversion       Somatic symptom disorder 06/01/2018     Priority: Medium     Pelvic floor weakness 04/25/2018     Priority: Medium     Spells of decreased attentiveness 12/19/2017     Priority: Medium     Mobile cecum 11/09/2017     Priority: Medium     Cecum noted in Right lower quadrant on 4/17 CT scan, and in Left upper Quadrant on CT on 11/2017.       Vitamin D deficiency 10/11/2017     Priority: Medium     How low, unknown/not found. On D when tested at 27. Starting cholecalciferol October 2017. Needs recheck.       Convulsions, unspecified convulsion type (H) 10/03/2017     Priority: Medium     Transient alteration of awareness 10/03/2017     Priority: Medium     Chronic pain syndrome 07/27/2017      Priority: Medium     Major depressive disorder, recurrent episode, moderate (H) 06/27/2017     Priority: Medium     Cervical pain 05/02/2017     Priority: Medium     Acute left ankle pain 03/31/2017     Priority: Medium     Cervical dystonia 03/28/2017     Priority: Medium     PTSD (post-traumatic stress disorder) 01/17/2017     Priority: Medium     Patellofemoral instability 10/20/2016     Priority: Medium     Fibromyalgia 08/04/2016     Priority: Medium     Rheumatoid arthritis of multiple sites without rheumatoid factor (H) 08/04/2016     Priority: Medium     Raynaud's disease without gangrene 08/04/2016     Priority: Medium     Chronic abdominal pain 08/04/2016     Priority: Medium     Palpitations 01/12/2016     Priority: Medium     On colchicine therapy 10/30/2015     Priority: Medium     Spell of shaking 05/06/2015     Priority: Medium     Migraine 02/04/2015     Priority: Medium     Behcet's disease (H) 12/10/2014     Priority: Medium     Headaches due to old head injury 11/12/2013     Priority: Medium     Milk protein intolerance 10/11/2013     Priority: Medium     Intestinal malabsorption 10/11/2013     Priority: Medium     Concussion 02/13/2013     Priority: Medium     Jan 2013, with prolonged recovery- followed by sports med         Knee pain 01/03/2013     Priority: Medium     Generalized anxiety disorder 06/25/2009     Priority: Medium     Tics - Tourette syndrome 05/18/2009     Priority: Medium     Followed by psychotherapy. Symptoms well managed. Originally diagnosed at [facility]. (Dr. Simpson)           IBS (irritable bowel syndrome) 05/18/2009     Priority: Medium     Allergic rhinitis 05/18/2009     Priority: Medium     GERD (gastroesophageal reflux disease) 01/10/2008     Priority: Medium     Gastroparesis 1994     Priority: Medium      Family History   Problem Relation Age of Onset     Depression Mother      Neurologic Disorder Mother         Migraines, take imitrex injection.   Also in maternal grandmother.       Alcohol/Drug Father      Hypertension Father      Depression Father      Osteoarthritis Father      Cardiovascular Maternal Grandmother      Depression Maternal Grandmother      Hypertension Maternal Grandmother      Alzheimer Disease Maternal Grandmother      Cardiovascular Maternal Grandfather      Hypertension Maternal Grandfather      Depression Maternal Grandfather      Alcohol/Drug Maternal Grandfather      Diabetes Maternal Grandfather      Cardiovascular Paternal Grandmother      Hypertension Paternal Grandmother      Cardiovascular Paternal Grandfather      Hypertension Paternal Grandfather      Glaucoma No family hx of      Macular Degeneration No family hx of       Allergies   Allergen Reactions     Amoxil [Penicillins] Rash     Dad unsure of reaction.     Bee Venom Anaphylaxis     Bioflavonoids Anaphylaxis     Citrus Anaphylaxis     All [place]     Contrast Dye Rash     Contrast Media Ready-Box [facility], 04/09/2014.; Contrast Media Ready-Box [facility], 04/09/2014.  NOTE: this is a contrast media oral with iodine. Premedicate with methylpred standard for IV contrast, request barium contrast for oral contrast.     Diagnostic X-Ray Materials Hives and Rash     Contrast Media Ready-Box [facility], 04/09/2014.; Contrast Media Ready-Box [facility], 04/09/2014.  NOTE: this is a contrast media oral with iodine. Premedicate with methylpred standard for IV contrast, request barium contrast for oral contrast.     Pineapple Anaphylaxis, Difficulty breathing and Rash     Reglan [Metoclopramide] Other (See Comments)     IV dose only, in ER, rapid heart rate.     Ace Inhibitors      Difficulty in breathing and GI upset     Amitiza [Lubiprostone] Nausea and Vomiting     Amoxicillin-Pot Clavulanate      Midazolam Unknown     parent states that when pt takes this medication, she wakes up being very violent .     Other [No Clinical Screening - See Comments]      Bleech/ chest tightness, itchy throat, swollen  tongue, hives     Tizanidine Other (See Comments)     Confusion, back pain, photophobia, abdominal pain, shaking, anxious       Versed      Coming out of pelvic exam at age of 5, was kicking and screaming when coming out of the versed.     Adhesive Tape Rash     Azithromycin Hives and Rash     Cephalexin Itching and Rash     Sulfa Drugs Rash     Skin scarring      Current Outpatient Medications   Medication Sig Dispense Refill     albuterol (PROAIR HFA/PROVENTIL HFA/VENTOLIN HFA) 108 (90 Base) MCG/ACT inhaler Inhale 2 puffs into the lungs every 6 hours as needed for shortness of breath / dyspnea or wheezing 1 Inhaler 1     amitriptyline (ELAVIL) 25 MG tablet TAKE 1 TABLET BY MOUTH EVERY NIGHT AT BEDTIME 90 tablet 1     apremilast (OTEZLA) 30 MG tablet Take 1 tablet (30 mg) by mouth 2 times daily HOLD FOR SIGNS OF INFECTION, AND SEEK MEDICAL ATTENTION. 180 tablet 1     artificial tears OINT ophthalmic ointment 0.5 inch strip each eye at night 1 Tube 11     aspirin (ASA) 81 MG chewable tablet        benzocaine (TOPICALE XTRA) 20 % GEL Apply as needed locally to mouth or nasal ulcers for pain; 4 times daily as needed 30 g 1     betamethasone valerate (VALISONE) 0.1 % cream Apply topically 2 times daily as needed        bisacodyl (DULCOLAX) 5 MG EC tablet Take 2 tablets (10 mg) by mouth daily as needed for constipation 30 tablet 0     citalopram (CELEXA) 20 MG tablet Take 1 tablet (20 mg) by mouth daily 90 tablet 1     dexamethasone (DECADRON) 4 MG/ML injection To be used by therapist during PT sessions. 30 mL 0     diclofenac (VOLTAREN) 75 MG EC tablet Take 1 tablet (75 mg) by mouth 2 times daily as needed for moderate pain 60 tablet 1     dicyclomine (BENTYL) 20 MG tablet Take 1 tablet (20 mg) by mouth 4 times daily as needed 120 tablet 3     diphenhydrAMINE (BENADRYL) 25 MG tablet Take 1 tablet (25 mg) by mouth 3 times daily as needed (itching) 40 tablet 1     enoxaparin ANTICOAGULANT (LOVENOX) 40 MG/0.4ML syringe         EPINEPHrine (EPIPEN 2-EAMON) 0.3 MG/0.3ML injection Inject 0.3 mLs (0.3 mg) into the muscle as needed for anaphylaxis 0.6 mL 3     gabapentin (NEURONTIN) 300 MG capsule TAKE ONE CAPSULE BY MOUTH THREE TIMES DAILY  90 capsule 0     hydrOXYzine (ATARAX) 25 MG tablet TAKE ONE OR TWO TABLETS BY MOUTH EVERY SIX HOURS AS NEEDED       hypromellose (GENTEAL) 0.3 % SOLN 1 drop every hour as needed for dry eyes        lidocaine (LMX4) 4 % external cream Apply topically once as needed for mild pain 120 g 1     LINZESS 290 MCG capsule TAKE 1 CAPSULE BY MOUTH EVERY DAY IN THE MORNING BEFORE BREAKFAST 90 capsule 0     LORazepam (ATIVAN) 0.5 MG tablet TAKE 1 TABLET BY MOUTH EVERY 6 HOURS AS NEEDED FOR ANXIETY 10 tablet 0     ondansetron (ZOFRAN-ODT) 8 MG ODT tab Take 1 tablet (8 mg) by mouth every 8 hours as needed for nausea 180 tablet 1     order for DME Equipment being ordered: Trilok brace 8 1/2 left lower extremity 1 Device 0     order for DME Equipment being ordered: size 8 1/2 walking boot tall 1 Device 0     polyethylene glycol (MIRALAX/GLYCOLAX) powder Take 1 capful by mouth 3 times daily       Prenatal MV-Min-Fe Fum-FA-DHA (PRENATAL 1 PO)        rizatriptan (MAXALT) 5 MG tablet Take 1 tablet (5 mg) by mouth at onset of headache for migraine May repeat in 2 hours. Max 6 tablets/24 hours. 10 tablet 1     sodium fluoride 1.1 % CREA Apply 1 Application topically daily       sucralfate (CARAFATE) 1 GM/10ML suspension Take 10 mLs (1 g) by mouth 4 times daily 1200 mL 2     triamcinolone (KENALOG) 0.1 % external ointment Apply twice daily as needed to lesions on the genitals and body. OK to use very sparingly on the face. 454 g 2     Apremilast (OTEZLA) 10 & 20 & 30 MG TBPK Take by mouth according to the instructions on the packet. Hold for signs of infection,any GI upset, weight loss or depression concerns, and seek medical attention. (Patient not taking: Reported on 12/6/2022) 1 each 0     furosemide (LASIX) 40 MG tablet  TAKE ONE TABLET BY MOUTH TWICE DAILY  (Patient not taking: Reported on 12/6/2022) 60 tablet 0     lactulose 20 GM/30ML SOLN Take 30 mLs by mouth 3 times daily as needed (for constipation) (Patient not taking: Reported on 12/6/2022) 300 mL 3     riboflavin 100 MG CAPS Take 2 capsules by mouth daily (Patient not taking: Reported on 1/25/2023)       triamcinolone (KENALOG) 0.1 % paste Take by mouth 2 times daily (Patient not taking: Reported on 12/6/2022) 5 g 5     triamcinolone (KENALOG) 0.5 % external ointment Apply 1 g topically 3 times daily as needed for irritation (Patient not taking: Reported on 1/25/2023) 15 g 3           Physical Exam     Constitutional:       Appearance: NAD, pleasant    Eyes: nl sclera, conj    Musculoskeletal:      No synovitis  Skin:  No rash.   Neurological:   Non focal    Psych: nl affect        There is currently no information documented on the homunculus. Go to the Rheumatology activity and complete the homunculus joint exam.  Joint Exam 01/25/2023     No joint exam has been documented for this visit          Data   Data      10/28/2022   BEAUCHAMP-28 (ESR) --   BEAUCHAMP-28 (CRP) --   Tender (BEAUCHAMP-28) 2 / 28    Swollen (BEAUCHAMP-28) 0 / 28    Provider Global --   Patient Global --   ESR 17 mm/hr   CRP --     No results found for any visits on 01/25/23.  CBC RESULTS: Recent Labs   Lab Test 09/07/20  1147   WBC 4.1   RBC 5.09   HGB 13.0   HCT 43.0   MCV 85   MCH 25.5*   MCHC 30.2*   RDW 15.0        TSH   Date Value Ref Range Status   09/07/2020 0.77 0.40 - 4.00 mU/L Final   03/21/2019 0.53 0.40 - 4.00 mU/L Final   10/30/2018 1.06 0.40 - 4.00 mU/L Final   11/26/2016 0.87 0.40 - 4.00 mU/L Final     T4 Free   Date Value Ref Range Status   01/31/2007 1.26 0.70 - 1.85 ng/dL Final     Rheumatoid Factor   Date Value Ref Range Status   09/30/2020 <7 <12 IU/mL Final   05/06/2016 <20 <20 IU/mL Final       Reviewed Rheumatology lab flowsheet

## 2023-02-02 ENCOUNTER — VIRTUAL VISIT (OUTPATIENT)
Dept: DERMATOLOGY | Facility: CLINIC | Age: 28
End: 2023-02-02
Payer: COMMERCIAL

## 2023-02-02 DIAGNOSIS — M35.2 BEHCET'S SYNDROME (H): Primary | ICD-10-CM

## 2023-02-02 DIAGNOSIS — K13.79 RECURRENT ORAL ULCERS: ICD-10-CM

## 2023-02-02 PROCEDURE — 99213 OFFICE O/P EST LOW 20 MIN: CPT | Mod: TEL | Performed by: DERMATOLOGY

## 2023-02-02 RX ORDER — MOMETASONE FUROATE 1 MG/G
OINTMENT TOPICAL 2 TIMES DAILY
Qty: 45 G | Refills: 3 | Status: SHIPPED | OUTPATIENT
Start: 2023-02-02 | End: 2023-09-15

## 2023-02-02 RX ORDER — FLUOCINONIDE GEL 0.5 MG/G
GEL TOPICAL 2 TIMES DAILY
Qty: 60 G | Refills: 3 | Status: SHIPPED | OUTPATIENT
Start: 2023-02-02 | End: 2023-09-15

## 2023-02-02 NOTE — NURSING NOTE
Chief Complaint   Patient presents with     Follow Up           Teledermatology Nurse Call Patients:     Are you in the [facility] at the time of the encounter? yes    Today's visit will be billed to you and your insurance.    A teledermatology visit is not as thorough as an in-person visit and the quality of the photograph sent may not be of the same quality as that taken by the dermatology clinic.    Vladimir Santana VF

## 2023-02-02 NOTE — LETTER
2/2/2023       RE: Samara Oropeza  [address]  [address]  [facility] [place] [address]     Dear Colleague,    Thank you for referring your patient, Samara Oropeza, to the [facility] [place] at [facility]. Please see a copy of my visit note below.    [facility] Dermatology Note  Encounter Date: Feb 2, 2023  Store-and-Forward and Video. Location of teledermatologist: [facility] [place].  Start time: 2:33. End time: 2:41.    Dermatology Problem List:  1. Behcet's syndrome   - Diagnosed 2014, primarily managed by rheumatology  - Periodic painful oral/genital (scarring) ulcers, folliculitis, hx positive pathergy test, prior success with apremilast (holding for pregnancy)  - Current: apremilast 30 mg BID per rheum, mometasone ointment, fluocinonide gel  - Prior: colchicine (holding for pregnancy), adalimumab (ineffective), triamcinolone paste for oral lesions, triamcinolone 0.1% oint BID PRN for genital/body lesions  - Future: certolizumab       ____________________________________________    Assessment & Plan:     1. Behcet's: overall a bit more active on mucocutaneous surfaces in last 1-2 months. Currently pregnant and due in 3-4 weeks. On apremilast 30 mg BID which has been more helpful than once daily dose, but skin remains active and orogenital involvement is intermittently active. Will increase potency of topicals. Could consider TNF after pregnancy if needed.  - apremilast 30 mg BID per rheumatology  - increase to mometasone ointment and fluocinonide gel    Procedures Performed:    None    Follow-up: 3 months    Staff:     Cornell Tracey MD, FAAD   of Dermatology  Department of Dermatology  [facility]    ____________________________________________    CC: Follow Up (/)    HPI:  Ms. Samara Oropeza is a(n 27  year old female who presents today as a return patient for Behcet's    Behcet's - started to have bad flares on skin and orogenital lesions  - orogenital lesions improved but skin consistent  - increased apremilast back to twice daily  - currently using triamcinolone cream - was initially helping but not since flares have gotten worse in last 2 months  - due date is end of February    Patient is otherwise feeling well, without additional skin concerns.    Labs Reviewed:  N/A    Physical Exam:  Vitals: There were no vitals taken for this visit.  SKIN: Teledermatology photos were reviewed; image quality and interpretability: acceptable. Image date: 2/2/23.  - erythematous/hyperpigmented papules on the face and upper chest  - No other lesions of concern on areas examined.     Medications:  Current Outpatient Medications   Medication     albuterol (PROAIR HFA/PROVENTIL HFA/VENTOLIN HFA) 108 (90 Base) MCG/ACT inhaler     amitriptyline (ELAVIL) 25 MG tablet     apremilast (OTEZLA) 30 MG tablet     artificial tears OINT ophthalmic ointment     aspirin (ASA) 81 MG chewable tablet     benzocaine (TOPICALE XTRA) 20 % GEL     betamethasone valerate (VALISONE) 0.1 % cream     bisacodyl (DULCOLAX) 5 MG EC tablet     citalopram (CELEXA) 20 MG tablet     dexamethasone (DECADRON) 4 MG/ML injection     diclofenac (VOLTAREN) 75 MG EC tablet     dicyclomine (BENTYL) 20 MG tablet     diphenhydrAMINE (BENADRYL) 25 MG tablet     enoxaparin ANTICOAGULANT (LOVENOX) 40 MG/0.4ML syringe     EPINEPHrine (EPIPEN 2-EAMON) 0.3 MG/0.3ML injection     gabapentin (NEURONTIN) 300 MG capsule     hydrOXYzine (ATARAX) 25 MG tablet     hypromellose (GENTEAL) 0.3 % SOLN     lidocaine (LMX4) 4 % external cream     LINZESS 290 MCG capsule     LORazepam (ATIVAN) 0.5 MG tablet     ondansetron (ZOFRAN-ODT) 8 MG ODT tab     order for DME     order for DME     polyethylene glycol (MIRALAX/GLYCOLAX) powder     Prenatal MV-Min-Fe Fum-FA-DHA (PRENATAL 1 PO)      rizatriptan (MAXALT) 5 MG tablet     sodium fluoride 1.1 % CREA     sucralfate (CARAFATE) 1 GM/10ML suspension     triamcinolone (KENALOG) 0.1 % external ointment     Apremilast (OTEZLA) 10 & 20 & 30 MG TBPK     furosemide (LASIX) 40 MG tablet     lactulose 20 GM/30ML SOLN     riboflavin 100 MG CAPS     triamcinolone (KENALOG) 0.1 % paste     triamcinolone (KENALOG) 0.5 % external ointment     Current Facility-Administered Medications   Medication     botulinum toxin type A (BOTOX) 100 units injection 200 Units     botulinum toxin type A (BOTOX) 100 units injection 200 Units     lidocaine 1 % injection 0.5 mL     lidocaine 1 % injection 0.5 mL     lidocaine 1 % injection 0.5 mL     methylPREDNISolone (DEPO-MEDROL) injection 40 mg     triamcinolone (KENALOG-40) injection 40 mg     triamcinolone (KENALOG-40) injection 40 mg     triamcinolone (KENALOG-40) injection 40 mg      Past Medical/Surgical History:   Patient Active Problem List   Diagnosis     Tics - Tourette syndrome     IBS (irritable bowel syndrome)     Allergic rhinitis     Generalized anxiety disorder     Knee pain     Concussion     Milk protein intolerance     Headaches due to old head injury     Behcet's disease (H)     Migraine     Spell of shaking     On colchicine therapy     Palpitations     Fibromyalgia     Rheumatoid arthritis of multiple sites without rheumatoid factor (H)     Raynaud's disease without gangrene     Chronic abdominal pain     Patellofemoral instability     PTSD (post-traumatic stress disorder)     Cervical dystonia     Acute left ankle pain     Cervical pain     Major depressive disorder, recurrent episode, moderate (H)     Chronic pain syndrome     Convulsions, unspecified convulsion type (H)     Transient alteration of awareness     Vitamin D deficiency     Mobile cecum     Spells of decreased attentiveness     Pelvic floor weakness     Somatic symptom disorder     Gastroparesis     GERD (gastroesophageal reflux disease)      Displacement of lumbar intervertebral disc without myelopathy     Intestinal malabsorption     Iron deficiency associated with nonfamilial restless legs syndrome     Enthesopathy of hip region     Tourette's syndrome     Cellulitis     Infection of joint of ankle (H)     Past Medical History:   Diagnosis Date     Anemia      Anxiety      Arthritis      Behcet's disease (H)      Cervical adenitis May 2010     Chronic abdominal pain      Constipation, chronic 1994     Fibromyalgia      Gastro-oesophageal reflux disease      Gastroparesis      Irregular heart beat     tachycardia, has had workup     Migraines      Neuromuscular disorder (H)     fibramyalgia     Palpitations      PONV (postoperative nausea and vomiting)      Seizure (H)      Seizures (H)     unknown etiology     Syncope      Avni's        CC Referred Self, MD  No address on file on close of this encounter.

## 2023-02-02 NOTE — PROGRESS NOTES
[facility] Dermatology Note  Encounter Date: Feb 2, 2023  Store-and-Forward and Video. Location of teledermatologist: [facility] [place].  Start time: 2:33. End time: 2:41.    Dermatology Problem List:  1. Behcet's syndrome   - Diagnosed 2014, primarily managed by rheumatology  - Periodic painful oral/genital (scarring) ulcers, folliculitis, hx positive pathergy test, prior success with apremilast (holding for pregnancy)  - Current: apremilast 30 mg BID per rheum, mometasone ointment, fluocinonide gel  - Prior: colchicine (holding for pregnancy), adalimumab (ineffective), triamcinolone paste for oral lesions, triamcinolone 0.1% oint BID PRN for genital/body lesions  - Future: certolizumab       ____________________________________________    Assessment & Plan:     1. Behcet's: overall a bit more active on mucocutaneous surfaces in last 1-2 months. Currently pregnant and due in 3-4 weeks. On apremilast 30 mg BID which has been more helpful than once daily dose, but skin remains active and orogenital involvement is intermittently active. Will increase potency of topicals. Could consider TNF after pregnancy if needed.  - apremilast 30 mg BID per rheumatology  - increase to mometasone ointment and fluocinonide gel    Procedures Performed:    None    Follow-up: 3 months    Staff:     Cornell Tracey MD, FAAD   of Dermatology  Department of Dermatology  [facility]    ____________________________________________    CC: Follow Up (/)    HPI:  Ms. Samara Oropeza is a(n) 27 year old female who presents today as a return patient for Behcet's    Behcet's - started to have bad flares on skin and orogenital lesions  - orogenital lesions improved but skin consistent  - increased apremilast back to twice daily  - currently using triamcinolone cream - was initially helping but not since flares have gotten worse in last 2  months  - due date is end of February    Patient is otherwise feeling well, without additional skin concerns.    Labs Reviewed:  N/A    Physical Exam:  Vitals: There were no vitals taken for this visit.  SKIN: Teledermatology photos were reviewed; image quality and interpretability: acceptable. Image date: 2/2/23.  - erythematous/hyperpigmented papules on the face and upper chest  - No other lesions of concern on areas examined.     Medications:  Current Outpatient Medications   Medication     albuterol (PROAIR HFA/PROVENTIL HFA/VENTOLIN HFA) 108 (90 Base) MCG/ACT inhaler     amitriptyline (ELAVIL) 25 MG tablet     apremilast (OTEZLA) 30 MG tablet     artificial tears OINT ophthalmic ointment     aspirin (ASA) 81 MG chewable tablet     benzocaine (TOPICALE XTRA) 20 % GEL     betamethasone valerate (VALISONE) 0.1 % cream     bisacodyl (DULCOLAX) 5 MG EC tablet     citalopram (CELEXA) 20 MG tablet     dexamethasone (DECADRON) 4 MG/ML injection     diclofenac (VOLTAREN) 75 MG EC tablet     dicyclomine (BENTYL) 20 MG tablet     diphenhydrAMINE (BENADRYL) 25 MG tablet     enoxaparin ANTICOAGULANT (LOVENOX) 40 MG/0.4ML syringe     EPINEPHrine (EPIPEN 2-EAMON) 0.3 MG/0.3ML injection     gabapentin (NEURONTIN) 300 MG capsule     hydrOXYzine (ATARAX) 25 MG tablet     hypromellose (GENTEAL) 0.3 % SOLN     lidocaine (LMX4) 4 % external cream     LINZESS 290 MCG capsule     LORazepam (ATIVAN) 0.5 MG tablet     ondansetron (ZOFRAN-ODT) 8 MG ODT tab     order for DME     order for DME     polyethylene glycol (MIRALAX/GLYCOLAX) powder     Prenatal MV-Min-Fe Fum-FA-DHA (PRENATAL 1 PO)     rizatriptan (MAXALT) 5 MG tablet     sodium fluoride 1.1 % CREA     sucralfate (CARAFATE) 1 GM/10ML suspension     triamcinolone (KENALOG) 0.1 % external ointment     Apremilast (OTEZLA) 10 & 20 & 30 MG TBPK     furosemide (LASIX) 40 MG tablet     lactulose 20 GM/30ML SOLN     riboflavin 100 MG CAPS     triamcinolone (KENALOG) 0.1 % paste      triamcinolone (KENALOG) 0.5 % external ointment     Current Facility-Administered Medications   Medication     botulinum toxin type A (BOTOX) 100 units injection 200 Units     botulinum toxin type A (BOTOX) 100 units injection 200 Units     lidocaine 1 % injection 0.5 mL     lidocaine 1 % injection 0.5 mL     lidocaine 1 % injection 0.5 mL     methylPREDNISolone (DEPO-MEDROL) injection 40 mg     triamcinolone (KENALOG-40) injection 40 mg     triamcinolone (KENALOG-40) injection 40 mg     triamcinolone (KENALOG-40) injection 40 mg      Past Medical/Surgical History:   Patient Active Problem List   Diagnosis     Tics - Tourette syndrome     IBS (irritable bowel syndrome)     Allergic rhinitis     Generalized anxiety disorder     Knee pain     Concussion     Milk protein intolerance     Headaches due to old head injury     Behcet's disease (H)     Migraine     Spell of shaking     On colchicine therapy     Palpitations     Fibromyalgia     Rheumatoid arthritis of multiple sites without rheumatoid factor (H)     Raynaud's disease without gangrene     Chronic abdominal pain     Patellofemoral instability     PTSD (post-traumatic stress disorder)     Cervical dystonia     Acute left ankle pain     Cervical pain     Major depressive disorder, recurrent episode, moderate (H)     Chronic pain syndrome     Convulsions, unspecified convulsion type (H)     Transient alteration of awareness     Vitamin D deficiency     Mobile cecum     Spells of decreased attentiveness     Pelvic floor weakness     Somatic symptom disorder     Gastroparesis     GERD (gastroesophageal reflux disease)     Displacement of lumbar intervertebral disc without myelopathy     Intestinal malabsorption     Iron deficiency associated with nonfamilial restless legs syndrome     Enthesopathy of hip region     Tourette's syndrome     Cellulitis     Infection of joint of ankle (H)     Past Medical History:   Diagnosis Date     Anemia      Anxiety       Arthritis      Behcet's disease (H)      Cervical adenitis May 2010     Chronic abdominal pain      Constipation, chronic 1994     Fibromyalgia      Gastro-oesophageal reflux disease      Gastroparesis      Irregular heart beat     tachycardia, has had workup     Migraines      Neuromuscular disorder (H)     fibramyalgia     Palpitations      PONV (postoperative nausea and vomiting)      Seizure (H)      Seizures (H)     unknown etiology     Syncope      Tourette's        CC Referred Self, MD  No address on file on close of this encounter.

## 2023-02-10 ENCOUNTER — HOSPITAL ENCOUNTER (INPATIENT)
Facility: CLINIC | Age: 28
LOS: 5 days | Discharge: HOME HEALTH | End: 2023-02-15
Attending: OBSTETRICS & GYNECOLOGY | Admitting: OBSTETRICS & GYNECOLOGY
Payer: COMMERCIAL

## 2023-02-10 DIAGNOSIS — O14.90 PRE-ECLAMPSIA, ANTEPARTUM: ICD-10-CM

## 2023-02-10 DIAGNOSIS — O14.93 PRE-ECLAMPSIA IN THIRD TRIMESTER: ICD-10-CM

## 2023-02-10 LAB
ABO/RH(D): NORMAL
ALBUMIN MFR UR ELPH: 90.4 MG/DL (ref 1–14)
ALT SERPL W P-5'-P-CCNC: 15 U/L (ref 10–35)
ALT SERPL W P-5'-P-CCNC: 17 U/L (ref 10–35)
ANTIBODY SCREEN: NEGATIVE
AST SERPL W P-5'-P-CCNC: 23 U/L (ref 10–35)
AST SERPL W P-5'-P-CCNC: 25 U/L (ref 10–35)
BASOPHILS # BLD AUTO: 0 10E3/UL (ref 0–0.2)
BASOPHILS NFR BLD AUTO: 0 %
CREAT SERPL-MCNC: 0.59 MG/DL (ref 0.51–0.95)
CREAT SERPL-MCNC: 0.62 MG/DL (ref 0.51–0.95)
CREAT UR-MCNC: 53.8 MG/DL
EOSINOPHIL # BLD AUTO: 0.1 10E3/UL (ref 0–0.7)
EOSINOPHIL NFR BLD AUTO: 1 %
ERYTHROCYTE [DISTWIDTH] IN BLOOD BY AUTOMATED COUNT: 14.1 % (ref 10–15)
ERYTHROCYTE [DISTWIDTH] IN BLOOD BY AUTOMATED COUNT: 14.3 % (ref 10–15)
GFR SERPL CREATININE-BSD FRML MDRD: >90 ML/MIN/1.73M2
GFR SERPL CREATININE-BSD FRML MDRD: >90 ML/MIN/1.73M2
HCT VFR BLD AUTO: 30.3 % (ref 35–47)
HCT VFR BLD AUTO: 31.9 % (ref 35–47)
HGB BLD-MCNC: 10 G/DL (ref 11.7–15.7)
HGB BLD-MCNC: 9.4 G/DL (ref 11.7–15.7)
IMM GRANULOCYTES # BLD: 0 10E3/UL
IMM GRANULOCYTES NFR BLD: 1 %
LYMPHOCYTES # BLD AUTO: 1.5 10E3/UL (ref 0.8–5.3)
LYMPHOCYTES NFR BLD AUTO: 20 %
MCH RBC QN AUTO: 27.2 PG (ref 26.5–33)
MCH RBC QN AUTO: 27.4 PG (ref 26.5–33)
MCHC RBC AUTO-ENTMCNC: 31 G/DL (ref 31.5–36.5)
MCHC RBC AUTO-ENTMCNC: 31.3 G/DL (ref 31.5–36.5)
MCV RBC AUTO: 87 FL (ref 78–100)
MCV RBC AUTO: 88 FL (ref 78–100)
MONOCYTES # BLD AUTO: 0.5 10E3/UL (ref 0–1.3)
MONOCYTES NFR BLD AUTO: 7 %
NEUTROPHILS # BLD AUTO: 5.2 10E3/UL (ref 1.6–8.3)
NEUTROPHILS NFR BLD AUTO: 71 %
NRBC # BLD AUTO: 0 10E3/UL
NRBC BLD AUTO-RTO: 0 /100
PLATELET # BLD AUTO: 173 10E3/UL (ref 150–450)
PLATELET # BLD AUTO: 188 10E3/UL (ref 150–450)
PROT/CREAT 24H UR: 1.68 MG/MG CR (ref 0–0.2)
RBC # BLD AUTO: 3.43 10E6/UL (ref 3.8–5.2)
RBC # BLD AUTO: 3.68 10E6/UL (ref 3.8–5.2)
SPECIMEN EXPIRATION DATE: NORMAL
URATE SERPL-MCNC: 5 MG/DL (ref 2.4–5.7)
WBC # BLD AUTO: 7.4 10E3/UL (ref 4–11)
WBC # BLD AUTO: 7.7 10E3/UL (ref 4–11)

## 2023-02-10 PROCEDURE — 36415 COLL VENOUS BLD VENIPUNCTURE: CPT | Performed by: OBSTETRICS & GYNECOLOGY

## 2023-02-10 PROCEDURE — 82565 ASSAY OF CREATININE: CPT | Performed by: OBSTETRICS & GYNECOLOGY

## 2023-02-10 PROCEDURE — 84550 ASSAY OF BLOOD/URIC ACID: CPT | Performed by: OBSTETRICS & GYNECOLOGY

## 2023-02-10 PROCEDURE — 250N000011 HC RX IP 250 OP 636: Performed by: OBSTETRICS & GYNECOLOGY

## 2023-02-10 PROCEDURE — 85014 HEMATOCRIT: CPT | Performed by: OBSTETRICS & GYNECOLOGY

## 2023-02-10 PROCEDURE — 86850 RBC ANTIBODY SCREEN: CPT | Performed by: OBSTETRICS & GYNECOLOGY

## 2023-02-10 PROCEDURE — 258N000003 HC RX IP 258 OP 636: Performed by: OBSTETRICS & GYNECOLOGY

## 2023-02-10 PROCEDURE — 84156 ASSAY OF PROTEIN URINE: CPT | Performed by: OBSTETRICS & GYNECOLOGY

## 2023-02-10 PROCEDURE — G0463 HOSPITAL OUTPT CLINIC VISIT: HCPCS

## 2023-02-10 PROCEDURE — 86901 BLOOD TYPING SEROLOGIC RH(D): CPT | Performed by: OBSTETRICS & GYNECOLOGY

## 2023-02-10 PROCEDURE — 84450 TRANSFERASE (AST) (SGOT): CPT | Performed by: OBSTETRICS & GYNECOLOGY

## 2023-02-10 PROCEDURE — 86780 TREPONEMA PALLIDUM: CPT | Performed by: OBSTETRICS & GYNECOLOGY

## 2023-02-10 PROCEDURE — 84460 ALANINE AMINO (ALT) (SGPT): CPT | Performed by: OBSTETRICS & GYNECOLOGY

## 2023-02-10 PROCEDURE — 120N000001 HC R&B MED SURG/OB

## 2023-02-10 PROCEDURE — 85025 COMPLETE CBC W/AUTO DIFF WBC: CPT | Performed by: OBSTETRICS & GYNECOLOGY

## 2023-02-10 PROCEDURE — 250N000013 HC RX MED GY IP 250 OP 250 PS 637: Performed by: OBSTETRICS & GYNECOLOGY

## 2023-02-10 RX ORDER — NIFEDIPINE 30 MG/1
30 TABLET, EXTENDED RELEASE ORAL DAILY
Status: ON HOLD | COMMUNITY
End: 2023-02-12

## 2023-02-10 RX ORDER — NALOXONE HYDROCHLORIDE 0.4 MG/ML
0.2 INJECTION, SOLUTION INTRAMUSCULAR; INTRAVENOUS; SUBCUTANEOUS
Status: DISCONTINUED | OUTPATIENT
Start: 2023-02-10 | End: 2023-02-12 | Stop reason: HOSPADM

## 2023-02-10 RX ORDER — NIFEDIPINE 30 MG/1
30 TABLET, EXTENDED RELEASE ORAL ONCE
Status: COMPLETED | OUTPATIENT
Start: 2023-02-10 | End: 2023-02-10

## 2023-02-10 RX ORDER — MAGNESIUM SULFATE HEPTAHYDRATE 40 MG/ML
2 INJECTION, SOLUTION INTRAVENOUS
Status: DISCONTINUED | OUTPATIENT
Start: 2023-02-10 | End: 2023-02-12

## 2023-02-10 RX ORDER — DIPHENHYDRAMINE HYDROCHLORIDE 50 MG/ML
50 INJECTION INTRAMUSCULAR; INTRAVENOUS
Status: DISCONTINUED | OUTPATIENT
Start: 2023-02-10 | End: 2023-02-12

## 2023-02-10 RX ORDER — NALOXONE HYDROCHLORIDE 0.4 MG/ML
0.4 INJECTION, SOLUTION INTRAMUSCULAR; INTRAVENOUS; SUBCUTANEOUS
Status: DISCONTINUED | OUTPATIENT
Start: 2023-02-10 | End: 2023-02-12 | Stop reason: HOSPADM

## 2023-02-10 RX ORDER — NIFEDIPINE 30 MG/1
30 TABLET, EXTENDED RELEASE ORAL DAILY
Status: DISCONTINUED | OUTPATIENT
Start: 2023-02-10 | End: 2023-02-10

## 2023-02-10 RX ORDER — HYDRALAZINE HYDROCHLORIDE 20 MG/ML
10 INJECTION INTRAMUSCULAR; INTRAVENOUS
Status: DISCONTINUED | OUTPATIENT
Start: 2023-02-10 | End: 2023-02-12

## 2023-02-10 RX ORDER — TRANEXAMIC ACID 10 MG/ML
1 INJECTION, SOLUTION INTRAVENOUS EVERY 30 MIN PRN
Status: DISCONTINUED | OUTPATIENT
Start: 2023-02-10 | End: 2023-02-12 | Stop reason: HOSPADM

## 2023-02-10 RX ORDER — LIDOCAINE 40 MG/G
CREAM TOPICAL
Status: DISCONTINUED | OUTPATIENT
Start: 2023-02-10 | End: 2023-02-10 | Stop reason: HOSPADM

## 2023-02-10 RX ORDER — ACETAMINOPHEN 325 MG/1
650 TABLET ORAL EVERY 6 HOURS PRN
COMMUNITY
End: 2023-09-27

## 2023-02-10 RX ORDER — IBUPROFEN 800 MG/1
800 TABLET, FILM COATED ORAL
Status: DISCONTINUED | OUTPATIENT
Start: 2023-02-10 | End: 2023-02-12

## 2023-02-10 RX ORDER — LABETALOL HYDROCHLORIDE 5 MG/ML
20-80 INJECTION, SOLUTION INTRAVENOUS EVERY 10 MIN PRN
Status: DISCONTINUED | OUTPATIENT
Start: 2023-02-10 | End: 2023-02-12

## 2023-02-10 RX ORDER — HYDROXYZINE HYDROCHLORIDE 50 MG/1
50 TABLET, FILM COATED ORAL
Status: DISCONTINUED | OUTPATIENT
Start: 2023-02-10 | End: 2023-02-11

## 2023-02-10 RX ORDER — OXYTOCIN/0.9 % SODIUM CHLORIDE 30/500 ML
340 PLASTIC BAG, INJECTION (ML) INTRAVENOUS CONTINUOUS PRN
Status: DISCONTINUED | OUTPATIENT
Start: 2023-02-10 | End: 2023-02-12 | Stop reason: HOSPADM

## 2023-02-10 RX ORDER — MISOPROSTOL 200 UG/1
400 TABLET ORAL
Status: DISCONTINUED | OUTPATIENT
Start: 2023-02-10 | End: 2023-02-12 | Stop reason: HOSPADM

## 2023-02-10 RX ORDER — SODIUM CHLORIDE, SODIUM LACTATE, POTASSIUM CHLORIDE, CALCIUM CHLORIDE 600; 310; 30; 20 MG/100ML; MG/100ML; MG/100ML; MG/100ML
10-125 INJECTION, SOLUTION INTRAVENOUS CONTINUOUS
Status: DISCONTINUED | OUTPATIENT
Start: 2023-02-10 | End: 2023-02-12

## 2023-02-10 RX ORDER — MISOPROSTOL 200 UG/1
800 TABLET ORAL
Status: DISCONTINUED | OUTPATIENT
Start: 2023-02-10 | End: 2023-02-12 | Stop reason: HOSPADM

## 2023-02-10 RX ORDER — MAGNESIUM SULFATE HEPTAHYDRATE 40 MG/ML
4 INJECTION, SOLUTION INTRAVENOUS ONCE
Status: DISCONTINUED | OUTPATIENT
Start: 2023-02-10 | End: 2023-02-12

## 2023-02-10 RX ORDER — METHYLERGONOVINE MALEATE 0.2 MG/ML
200 INJECTION INTRAVENOUS
Status: DISCONTINUED | OUTPATIENT
Start: 2023-02-10 | End: 2023-02-12 | Stop reason: HOSPADM

## 2023-02-10 RX ORDER — OXYTOCIN 10 [USP'U]/ML
10 INJECTION, SOLUTION INTRAMUSCULAR; INTRAVENOUS
Status: DISCONTINUED | OUTPATIENT
Start: 2023-02-10 | End: 2023-02-12 | Stop reason: HOSPADM

## 2023-02-10 RX ORDER — CALCIUM GLUCONATE 94 MG/ML
1 INJECTION, SOLUTION INTRAVENOUS
Status: DISCONTINUED | OUTPATIENT
Start: 2023-02-10 | End: 2023-02-12

## 2023-02-10 RX ORDER — ONDANSETRON 2 MG/ML
4 INJECTION INTRAMUSCULAR; INTRAVENOUS EVERY 6 HOURS PRN
Status: DISCONTINUED | OUTPATIENT
Start: 2023-02-10 | End: 2023-02-11

## 2023-02-10 RX ORDER — MAGNESIUM SULFATE IN WATER 40 MG/ML
2 INJECTION, SOLUTION INTRAVENOUS CONTINUOUS
Status: DISCONTINUED | OUTPATIENT
Start: 2023-02-10 | End: 2023-02-12

## 2023-02-10 RX ORDER — CITRIC ACID/SODIUM CITRATE 334-500MG
30 SOLUTION, ORAL ORAL
Status: DISCONTINUED | OUTPATIENT
Start: 2023-02-10 | End: 2023-02-12 | Stop reason: HOSPADM

## 2023-02-10 RX ORDER — MAGNESIUM SULFATE HEPTAHYDRATE 40 MG/ML
4 INJECTION, SOLUTION INTRAVENOUS
Status: DISCONTINUED | OUTPATIENT
Start: 2023-02-10 | End: 2023-02-12

## 2023-02-10 RX ORDER — PROCHLORPERAZINE 25 MG
25 SUPPOSITORY, RECTAL RECTAL EVERY 12 HOURS PRN
Status: DISCONTINUED | OUTPATIENT
Start: 2023-02-10 | End: 2023-02-12 | Stop reason: HOSPADM

## 2023-02-10 RX ORDER — MISOPROSTOL 100 UG/1
25 TABLET ORAL
Status: DISCONTINUED | OUTPATIENT
Start: 2023-02-10 | End: 2023-02-12 | Stop reason: HOSPADM

## 2023-02-10 RX ORDER — OXYTOCIN/0.9 % SODIUM CHLORIDE 30/500 ML
100-340 PLASTIC BAG, INJECTION (ML) INTRAVENOUS CONTINUOUS PRN
Status: DISCONTINUED | OUTPATIENT
Start: 2023-02-10 | End: 2023-02-12

## 2023-02-10 RX ORDER — ONDANSETRON 4 MG/1
4 TABLET, ORALLY DISINTEGRATING ORAL EVERY 6 HOURS PRN
Status: DISCONTINUED | OUTPATIENT
Start: 2023-02-10 | End: 2023-02-11

## 2023-02-10 RX ORDER — LORAZEPAM 2 MG/ML
2 INJECTION INTRAMUSCULAR
Status: DISCONTINUED | OUTPATIENT
Start: 2023-02-10 | End: 2023-02-12

## 2023-02-10 RX ORDER — KETOROLAC TROMETHAMINE 30 MG/ML
30 INJECTION, SOLUTION INTRAMUSCULAR; INTRAVENOUS
Status: DISCONTINUED | OUTPATIENT
Start: 2023-02-10 | End: 2023-02-12

## 2023-02-10 RX ORDER — BETAMETHASONE SODIUM PHOSPHATE AND BETAMETHASONE ACETATE 3; 3 MG/ML; MG/ML
12 INJECTION, SUSPENSION INTRA-ARTICULAR; INTRALESIONAL; INTRAMUSCULAR; SOFT TISSUE EVERY 24 HOURS
Status: COMPLETED | OUTPATIENT
Start: 2023-02-10 | End: 2023-02-11

## 2023-02-10 RX ORDER — ACETAMINOPHEN 325 MG/1
975 TABLET ORAL EVERY 6 HOURS PRN
Status: DISCONTINUED | OUTPATIENT
Start: 2023-02-10 | End: 2023-02-12

## 2023-02-10 RX ORDER — NIFEDIPINE 30 MG/1
60 TABLET, EXTENDED RELEASE ORAL DAILY
Status: DISCONTINUED | OUTPATIENT
Start: 2023-02-11 | End: 2023-02-12

## 2023-02-10 RX ORDER — OXYTOCIN 10 [USP'U]/ML
10 INJECTION, SOLUTION INTRAMUSCULAR; INTRAVENOUS
Status: DISCONTINUED | OUTPATIENT
Start: 2023-02-10 | End: 2023-02-12

## 2023-02-10 RX ORDER — CARBOPROST TROMETHAMINE 250 UG/ML
250 INJECTION, SOLUTION INTRAMUSCULAR
Status: DISCONTINUED | OUTPATIENT
Start: 2023-02-10 | End: 2023-02-12 | Stop reason: HOSPADM

## 2023-02-10 RX ORDER — MAGNESIUM SULFATE HEPTAHYDRATE 500 MG/ML
10 INJECTION, SOLUTION INTRAMUSCULAR; INTRAVENOUS
Status: DISCONTINUED | OUTPATIENT
Start: 2023-02-10 | End: 2023-02-12

## 2023-02-10 RX ORDER — METHYLPREDNISOLONE SODIUM SUCCINATE 125 MG/2ML
125 INJECTION, POWDER, LYOPHILIZED, FOR SOLUTION INTRAMUSCULAR; INTRAVENOUS
Status: DISCONTINUED | OUTPATIENT
Start: 2023-02-10 | End: 2023-02-12

## 2023-02-10 RX ORDER — PROCHLORPERAZINE MALEATE 10 MG
10 TABLET ORAL EVERY 6 HOURS PRN
Status: DISCONTINUED | OUTPATIENT
Start: 2023-02-10 | End: 2023-02-12 | Stop reason: HOSPADM

## 2023-02-10 RX ADMIN — BETAMETHASONE SODIUM PHOSPHATE AND BETAMETHASONE ACETATE 12 MG: 3; 3 INJECTION, SUSPENSION INTRA-ARTICULAR; INTRALESIONAL; INTRAMUSCULAR at 16:17

## 2023-02-10 RX ADMIN — Medication 25 MCG: at 23:30

## 2023-02-10 RX ADMIN — IRON SUCROSE 300 MG: 20 INJECTION, SOLUTION INTRAVENOUS at 18:57

## 2023-02-10 RX ADMIN — SODIUM CHLORIDE, POTASSIUM CHLORIDE, SODIUM LACTATE AND CALCIUM CHLORIDE 125 ML/HR: 600; 310; 30; 20 INJECTION, SOLUTION INTRAVENOUS at 16:45

## 2023-02-10 RX ADMIN — HYDROXYZINE HYDROCHLORIDE 50 MG: 50 TABLET ORAL at 21:54

## 2023-02-10 RX ADMIN — Medication 25 MCG: at 17:24

## 2023-02-10 RX ADMIN — Medication 25 MCG: at 21:35

## 2023-02-10 RX ADMIN — ONDANSETRON 4 MG: 2 INJECTION INTRAMUSCULAR; INTRAVENOUS at 18:43

## 2023-02-10 RX ADMIN — Medication 25 MCG: at 19:34

## 2023-02-10 RX ADMIN — NIFEDIPINE 30 MG: 30 TABLET, FILM COATED, EXTENDED RELEASE ORAL at 16:24

## 2023-02-10 RX ADMIN — ACETAMINOPHEN 975 MG: 325 TABLET ORAL at 16:17

## 2023-02-10 RX ADMIN — AMITRIPTYLINE HYDROCHLORIDE 25 MG: 25 TABLET, FILM COATED ORAL at 21:55

## 2023-02-10 ASSESSMENT — ACTIVITIES OF DAILY LIVING (ADL)
ADLS_ACUITY_SCORE: 20
WEAR_GLASSES_OR_BLIND: YES
FALL_HISTORY_WITHIN_LAST_SIX_MONTHS: NO
DOING_ERRANDS_INDEPENDENTLY_DIFFICULTY: NO
DRESSING/BATHING_DIFFICULTY: NO
CHANGE_IN_FUNCTIONAL_STATUS_SINCE_ONSET_OF_CURRENT_ILLNESS/INJURY: NO
ADLS_ACUITY_SCORE: 35
ADLS_ACUITY_SCORE: 35
TOILETING_ISSUES: NO
VISION_MANAGEMENT: GLASSES
DIFFICULTY_EATING/SWALLOWING: NO
CONCENTRATING,_REMEMBERING_OR_MAKING_DECISIONS_DIFFICULTY: NO
WALKING_OR_CLIMBING_STAIRS_DIFFICULTY: NO
ADLS_ACUITY_SCORE: 20
ADLS_ACUITY_SCORE: 20

## 2023-02-10 NOTE — PROVIDER NOTIFICATION
"   02/10/23 1422   Provider Notification   Provider Name/Title Dr. Hancock   Method of Notification Phone     MD updated on patient arrival and history. Patient presents to triage from clinic with elevated blood pressures. 145/90 in clinic. 140/103 and 146/102 in triage. Pt reports HA last 2 days unrelieved by medication, mild headache today but \"nothing she would take medication for\". Pt reports 3 lb weight gain in last 3 days. 2+ edema in legs and 3+ in feet/ankles, mild pain in lower extremities. Patient reports SOB when laying flat in bed at night. Reflexes +2, 1 beat of clonus on each foot. Declines blurry vision and epigastric pain.     Reactive FHR tracing. Contraction x1 in 30 min that palpates mild. Blood pressures set for q15min.     Order for [facility] labs STAT. If blood pressures remain in mildly elevated range, can decrease frequency to every 30 min.  "

## 2023-02-10 NOTE — H&P
"  February 10, 2023    Samara Oropeza  [id number]      OB Admit History & Physical      Ms. Oropeza  is here sent from clinic during NST had recurrent mild range BP's.    She has noticed increased in weight due to fluid retention, feeling generlaized swelling worsening. Denies any vaginal bleeding, LOF, ctxs. +FM    Denies any HA, scotomata, RUQ or epigastric pain, SOB or CP.       No LMP recorded. Patient is pregnant.   Her Estimated Date of Delivery: Mar 6, 2023, making her 36w4d.      Estimated body mass index is 34.01 kg/m  as calculated from the following:    Height as of this encounter: 1.6 m (5' 3\").    Weight as of this encounter: 87.1 kg (192 lb).  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 L2 US unremarkable, anterior placenta.   -Rh positive, Rhogam not indicated  -GCT 72, Hgb 11.0 at 28 weeks  -Declines COVID despite being high risk, s/p counseling   - S/p Flu on 10/13/2022  -GBS negative  - Feed: Breast; Rx provided previously  - Contraception: S/p extensive discussion, recommended LARCs.      She is a 27 year old   Her OB history:   OB History    Para Term  AB Living   2 0 0 0 0 0   SAB IAB Ectopic Multiple Live Births   0 0 0 0 0      # Outcome Date GA Lbr Héctor/2nd Weight Sex Delivery Anes PTL Lv   2 Current            1                    Past Medical History:   Diagnosis Date     Anemia      Anxiety      Arthritis      Behcet's disease (H)      Cervical adenitis May 2010     Chronic abdominal pain      " Constipation, chronic 1994     Fibromyalgia      Gastro-oesophageal reflux disease      Gastroparesis      Irregular heart beat     tachycardia, has had workup     Migraines      Neuromuscular disorder (H)     fibramyalgia     Palpitations      PONV (postoperative nausea and vomiting)      Seizure (H)      Seizures (H)     unknown etiology     Syncope      Tourette's         Past Surgical History:   Procedure Laterality Date     ARTHROSCOPY ANKLE, OPEN REPAIR LIGAMENT, COMBINED Left 9/25/2019    Procedure: LEFT ANKLE ARTHROSCOPY WITH LIGAMENT REPAIR;  Surgeon: Andres Johnson DPM;  Location: [facility] OR     ARTHROSCOPY ANKLE, REPAIR LIGAMENT Left 1/2/2019    Procedure: Ankle arthroscopy and sinus tarsi evacuation, ligament repair, left lower extremity;  Surgeon: Andres Johnson DPM;  Location: [facility] OR     ARTHROSCOPY KNEE WITH PATELLAR REALIGNMENT  7/25/2013    Procedure: ARTHROSCOPY KNEE WITH PATELLAR REALIGNMENT;  Left Knee Arthroscopy, Medial Patellofemoral Ligament Reconstruction with Allograft  ;  Surgeon: Jennifer Acevedo MD;  Location: [facility] OR     COLONOSCOPY  2015     DENTAL SURGERY  1996    Teeth removal     ENDOSCOPY UPPER, COLONOSCOPY, COMBINED  2005     HC ESOPH/GAS REFLUX TEST W NASAL IMPED >1 HR N/A 2/15/2017    Procedure: ESOPHAGEAL IMPEDENCE FUNCTION TEST WITH 24 HOUR PH GREATER THAN 1 HOUR;  Surgeon: Timothy Matta MD;  Location: [facility]     IR PICC PLACEMENT > 5 YRS OF AGE  3/13/2019     IR UPPER EXTREMITY VENOGRAM LEFT  2/25/2020     IRRIGATION AND DEBRIDEMENT FOOT, COMBINED Left 3/12/2019    Procedure: COMBINED IRRIGATION AND DEBRIDEMENT LEFT ANKLE;  Surgeon: Micha Glover MD;  Location: [facility] OR     IRRIGATION AND DEBRIDEMENT LOWER EXTREMITY, COMBINED Left 5/7/2019    Procedure: 1.  Excision of wound down to and including deep fascia, less than 20 cm2.  2.  Irrigation and debridement, left ankle.;  Surgeon: Andres Johnson DPM;  Location: [facility] OR     PICC INSERTION Left  "05/05/2019    4Fr - 45cm (5cm external), Basilic vein, SVC RA junction         No current outpatient medications on file.       Allergies: Amoxil [penicillins], Bee venom, Bioflavonoids, Citrus, Contrast dye, Diagnostic x-ray materials, Pineapple, Reglan [metoclopramide], Ace inhibitors, Amitiza [lubiprostone], Amoxicillin-pot clavulanate, Midazolam, Other [no clinical screening - see comments], Tizanidine, Versed, Adhesive tape, Azithromycin, Cephalexin, and Sulfa drugs      REVIEW OF SYSTEMS:  NEUROLOGIC:  Negative  EYES:  Negative  ENT:  Negative  GI:  Negative  :  Negative  GYN:  Negative  CV:  Negative  PULMONARY:  Negative  MUSCULOSKELETAL:  Negative  PSYCH:  Negative      Social History     Socioeconomic History     Marital status: Single     Spouse name: Not on file     Number of children: Not on file     Years of education: Not on file     Highest education level: Not on file   Occupational History     Not on file   Tobacco Use     Smoking status: Never     Smokeless tobacco: Never   Substance and Sexual Activity     Alcohol use: Not Currently     Alcohol/week: 1.0 standard drink     Types: 1 Standard drinks or equivalent per week     Comment: rarely     Drug use: Not Currently     Types: Marijuana     Comment: occassional marijuana only, denies others     Sexual activity: Not Currently     Partners: Male     Birth control/protection: Pill   Other Topics Concern     Parent/sibling w/ CABG, MI or angioplasty before 65F 55M? Not Asked   Social History Narrative    Boyfriend of 5 years, living with \"in laws\" Oct 2017 and looking forward to their own apartment.      Social Determinants of Health     Financial Resource Strain: Not on file   Food Insecurity: Not on file   Transportation Needs: Not on file   Physical Activity: Not on file   Stress: Not on file   Social Connections: Not on file   Intimate Partner Violence: Not on file   Housing Stability: Not on file      Family History   Problem Relation Age of " "Onset     Depression Mother      Neurologic Disorder Mother         Migraines, take imitrex injection.  Also in maternal grandmother.       Alcohol/Drug Father      Hypertension Father      Depression Father      Osteoarthritis Father      Cardiovascular Maternal Grandmother      Depression Maternal Grandmother      Hypertension Maternal Grandmother      Alzheimer Disease Maternal Grandmother      Cardiovascular Maternal Grandfather      Hypertension Maternal Grandfather      Depression Maternal Grandfather      Alcohol/Drug Maternal Grandfather      Diabetes Maternal Grandfather      Cardiovascular Paternal Grandmother      Hypertension Paternal Grandmother      Cardiovascular Paternal Grandfather      Hypertension Paternal Grandfather      Glaucoma No family hx of      Macular Degeneration No family hx of          Exam:    Vitals:   BP (!) 158/103   Temp 98.4  F (36.9  C) (Oral)   Resp 18   Ht 1.6 m (5' 3\")   Wt 87.1 kg (192 lb)   SpO2 100%   BMI 34.01 kg/m    [place]:  quiet  FHT: 130 bpm, mod, a +, d -    Alert Awake in NAD  HEENT grossly normal  Neck: no lymphadenopathy or thryoidomegaly  Lungs CTAB  Back No CVAT  Heart RR  ABD gravid, NABS, soft, NT on exam with NT fundus palpable  Cervix is 0/50/-4, posterior, firm  EXT:  no edema, no calf tenderness      Assessment/Plan: Samara Oropeza is a 27 year old  at 36w4d GA here with CHTN CHAPARRO without severe features in the setting of severe FGR.     - admission orders IP  - Rh pos  - GBS neg  - Rubella Immune    CHTN with CHAPARRO w/o SF  - HELLP labs - pr/cr ratio 1.68, rest WNL   - platelets noted trending down   - repeat labs tonight and every 12 hrs  - currently BP's mild range  - increase procardia to 60 mg XL, extra 30 mg added now  - if severe range BP's will start algorithm with labetalol and start magnesium sulfate for seizure ppx  - discussed case with MFM (Dr. Gerber) and decision to proceed with BMZ now + start induction.   - thorough " discussion with pt and pt's  about current diagnosis consistent with CHAPARRO and the need to move forward with IOL. Process starting with cervical ripening. Pt opting for PO Cytotec at this point.   - ok for regular diet at this point    Fetal well being  - BMZ given 2/10-11 1630 hrs  - prolonged monitoring  - currently reactive NST  - NICU consulted    COLLEEN  - continue PO iron  - IV iron discussed and ordered    Chronic LUE thrombosis  - SCDs ordered  - will held off Lovenox and resume postpartum    TAHIR  - SW PP        Rocio Hancock MD  Dept of OB/GYN  February 10, 2023

## 2023-02-10 NOTE — PROVIDER NOTIFICATION
02/10/23 5113   Provider Notification   Provider Name/Title Dr. Hancock   Method of Notification At Bedside     MD at bedside to discuss plan of care with patient and spouse. Blood pressures 130s/90s. Category 1 FHR tracing and occasional mild contractions. Betamethasone, Tylenol, and Procardia given.    SVE 0/50/-2. Muhammad of 3.     Order to start Cytotec for cervical ripening, Atarax prn for sleep. Regular diet.

## 2023-02-10 NOTE — PLAN OF CARE
"Data: [facility] to [facility]: 2/10/2023  1:39 PM.  Reason for maternal/fetal assessment is elevated blood pressures. Patient has been taking blood pressure at home daily patient reports \"blood pressure was 150s/100s last night, she was instructed not to call unless it was over 160s/110s\" HILLIARD has been present last 2 days, unrelieved by Tylenol or Rizatriptan migraine perscription. Weight gain of 3lbs in last 3 days. Mild HA today, \"nothing she would take medication for\" Patient is a .  Prenatal record reviewed. Pregnancy  has been complicated by gestational hypertension and IUGR.  Gestational Age 36w4d. VSS. Fetal movement active. Patient denies leaking of vaginal fluid/rupture of membranes, vaginal bleeding, abdominal pain, pelvic pressure, vomiting, visual disturbances, epigastric or URQ pain. Support person is present.   Action: Verbal consent for EFM. Triage assessment completed. Bill of rights reviewed.  Response: Patient verbalized agreement with plan. Will contact Dr Rocio Hancock with update and for further orders.   "

## 2023-02-11 ENCOUNTER — ANESTHESIA (OUTPATIENT)
Dept: OBGYN | Facility: CLINIC | Age: 28
End: 2023-02-11
Payer: COMMERCIAL

## 2023-02-11 ENCOUNTER — ANESTHESIA EVENT (OUTPATIENT)
Dept: OBGYN | Facility: CLINIC | Age: 28
End: 2023-02-11
Payer: COMMERCIAL

## 2023-02-11 LAB
ALBUMIN MFR UR ELPH: 85 MG/DL (ref 1–14)
ALBUMIN SERPL BCG-MCNC: 3.3 G/DL (ref 3.5–5.2)
ALP SERPL-CCNC: 147 U/L (ref 35–104)
ALT SERPL W P-5'-P-CCNC: 16 U/L (ref 10–35)
ALT SERPL W P-5'-P-CCNC: 17 U/L (ref 10–35)
ANION GAP SERPL CALCULATED.3IONS-SCNC: 14 MMOL/L (ref 7–15)
AST SERPL W P-5'-P-CCNC: 26 U/L (ref 10–35)
AST SERPL W P-5'-P-CCNC: 28 U/L (ref 10–35)
BILIRUB SERPL-MCNC: 0.3 MG/DL
BUN SERPL-MCNC: 13.6 MG/DL (ref 6–20)
CALCIUM SERPL-MCNC: 8.5 MG/DL (ref 8.6–10)
CHLORIDE SERPL-SCNC: 105 MMOL/L (ref 98–107)
CREAT SERPL-MCNC: 0.59 MG/DL (ref 0.51–0.95)
CREAT SERPL-MCNC: 0.67 MG/DL (ref 0.51–0.95)
CREAT UR-MCNC: 93.4 MG/DL
DEPRECATED HCO3 PLAS-SCNC: 19 MMOL/L (ref 22–29)
ERYTHROCYTE [DISTWIDTH] IN BLOOD BY AUTOMATED COUNT: 14.2 % (ref 10–15)
ERYTHROCYTE [DISTWIDTH] IN BLOOD BY AUTOMATED COUNT: 14.4 % (ref 10–15)
GFR SERPL CREATININE-BSD FRML MDRD: >90 ML/MIN/1.73M2
GFR SERPL CREATININE-BSD FRML MDRD: >90 ML/MIN/1.73M2
GLUCOSE SERPL-MCNC: 104 MG/DL (ref 70–99)
HCT VFR BLD AUTO: 31.5 % (ref 35–47)
HCT VFR BLD AUTO: 31.9 % (ref 35–47)
HGB BLD-MCNC: 9.6 G/DL (ref 11.7–15.7)
HGB BLD-MCNC: 9.8 G/DL (ref 11.7–15.7)
MCH RBC QN AUTO: 27 PG (ref 26.5–33)
MCH RBC QN AUTO: 27.7 PG (ref 26.5–33)
MCHC RBC AUTO-ENTMCNC: 30.5 G/DL (ref 31.5–36.5)
MCHC RBC AUTO-ENTMCNC: 30.7 G/DL (ref 31.5–36.5)
MCV RBC AUTO: 88 FL (ref 78–100)
MCV RBC AUTO: 91 FL (ref 78–100)
PLATELET # BLD AUTO: 170 10E3/UL (ref 150–450)
PLATELET # BLD AUTO: 187 10E3/UL (ref 150–450)
POTASSIUM SERPL-SCNC: 4.2 MMOL/L (ref 3.4–5.3)
PROT SERPL-MCNC: 5.8 G/DL (ref 6.4–8.3)
PROT/CREAT 24H UR: 0.91 MG/MG CR (ref 0–0.2)
RBC # BLD AUTO: 3.47 10E6/UL (ref 3.8–5.2)
RBC # BLD AUTO: 3.63 10E6/UL (ref 3.8–5.2)
SODIUM SERPL-SCNC: 138 MMOL/L (ref 136–145)
T PALLIDUM AB SER QL: NONREACTIVE
WBC # BLD AUTO: 10 10E3/UL (ref 4–11)
WBC # BLD AUTO: 10.2 10E3/UL (ref 4–11)

## 2023-02-11 PROCEDURE — 250N000011 HC RX IP 250 OP 636: Performed by: ANESTHESIOLOGY

## 2023-02-11 PROCEDURE — 36415 COLL VENOUS BLD VENIPUNCTURE: CPT | Performed by: OBSTETRICS & GYNECOLOGY

## 2023-02-11 PROCEDURE — 3E0R3BZ INTRODUCTION OF ANESTHETIC AGENT INTO SPINAL CANAL, PERCUTANEOUS APPROACH: ICD-10-PCS | Performed by: ANESTHESIOLOGY

## 2023-02-11 PROCEDURE — 250N000013 HC RX MED GY IP 250 OP 250 PS 637: Performed by: OBSTETRICS & GYNECOLOGY

## 2023-02-11 PROCEDURE — 250N000009 HC RX 250: Performed by: OBSTETRICS & GYNECOLOGY

## 2023-02-11 PROCEDURE — 250N000011 HC RX IP 250 OP 636

## 2023-02-11 PROCEDURE — 85027 COMPLETE CBC AUTOMATED: CPT | Performed by: OBSTETRICS & GYNECOLOGY

## 2023-02-11 PROCEDURE — 80053 COMPREHEN METABOLIC PANEL: CPT | Performed by: OBSTETRICS & GYNECOLOGY

## 2023-02-11 PROCEDURE — 84460 ALANINE AMINO (ALT) (SGPT): CPT | Performed by: OBSTETRICS & GYNECOLOGY

## 2023-02-11 PROCEDURE — 00HU33Z INSERTION OF INFUSION DEVICE INTO SPINAL CANAL, PERCUTANEOUS APPROACH: ICD-10-PCS | Performed by: ANESTHESIOLOGY

## 2023-02-11 PROCEDURE — 84156 ASSAY OF PROTEIN URINE: CPT | Performed by: OBSTETRICS & GYNECOLOGY

## 2023-02-11 PROCEDURE — 258N000003 HC RX IP 258 OP 636: Performed by: OBSTETRICS & GYNECOLOGY

## 2023-02-11 PROCEDURE — 370N000003 HC ANESTHESIA WARD SERVICE

## 2023-02-11 PROCEDURE — 85014 HEMATOCRIT: CPT | Performed by: OBSTETRICS & GYNECOLOGY

## 2023-02-11 PROCEDURE — 85041 AUTOMATED RBC COUNT: CPT | Performed by: OBSTETRICS & GYNECOLOGY

## 2023-02-11 PROCEDURE — 120N000001 HC R&B MED SURG/OB

## 2023-02-11 PROCEDURE — 250N000011 HC RX IP 250 OP 636: Performed by: OBSTETRICS & GYNECOLOGY

## 2023-02-11 PROCEDURE — 84450 TRANSFERASE (AST) (SGOT): CPT | Performed by: OBSTETRICS & GYNECOLOGY

## 2023-02-11 RX ORDER — OXYTOCIN/0.9 % SODIUM CHLORIDE 30/500 ML
1-24 PLASTIC BAG, INJECTION (ML) INTRAVENOUS CONTINUOUS
Status: DISCONTINUED | OUTPATIENT
Start: 2023-02-11 | End: 2023-02-12 | Stop reason: HOSPADM

## 2023-02-11 RX ORDER — LIDOCAINE 40 MG/G
CREAM TOPICAL
Status: DISCONTINUED | OUTPATIENT
Start: 2023-02-11 | End: 2023-02-12 | Stop reason: HOSPADM

## 2023-02-11 RX ORDER — FENTANYL/BUPIVACAINE/NS/PF 2-1250MCG
PLASTIC BAG, INJECTION (ML) INJECTION
Status: COMPLETED
Start: 2023-02-11 | End: 2023-02-11

## 2023-02-11 RX ORDER — ALBUTEROL SULFATE 90 UG/1
2 AEROSOL, METERED RESPIRATORY (INHALATION) EVERY 6 HOURS PRN
Status: DISCONTINUED | OUTPATIENT
Start: 2023-02-11 | End: 2023-02-12

## 2023-02-11 RX ORDER — DIPHENHYDRAMINE HCL 25 MG
25 CAPSULE ORAL 3 TIMES DAILY PRN
Status: DISCONTINUED | OUTPATIENT
Start: 2023-02-11 | End: 2023-02-12

## 2023-02-11 RX ORDER — HYDROXYZINE HYDROCHLORIDE 25 MG/1
25 TABLET, FILM COATED ORAL
Status: DISCONTINUED | OUTPATIENT
Start: 2023-02-11 | End: 2023-02-12 | Stop reason: HOSPADM

## 2023-02-11 RX ORDER — EPHEDRINE SULFATE 50 MG/ML
5 INJECTION, SOLUTION INTRAMUSCULAR; INTRAVENOUS; SUBCUTANEOUS
Status: DISCONTINUED | OUTPATIENT
Start: 2023-02-11 | End: 2023-02-12 | Stop reason: HOSPADM

## 2023-02-11 RX ORDER — ONDANSETRON 2 MG/ML
4 INJECTION INTRAMUSCULAR; INTRAVENOUS EVERY 6 HOURS PRN
Status: DISCONTINUED | OUTPATIENT
Start: 2023-02-11 | End: 2023-02-12 | Stop reason: HOSPADM

## 2023-02-11 RX ORDER — HYDROXYZINE HYDROCHLORIDE 25 MG/1
25 TABLET, FILM COATED ORAL EVERY 6 HOURS PRN
Status: DISCONTINUED | OUTPATIENT
Start: 2023-02-11 | End: 2023-02-12

## 2023-02-11 RX ORDER — FENTANYL CITRATE 50 UG/ML
100 INJECTION, SOLUTION INTRAMUSCULAR; INTRAVENOUS ONCE
Status: COMPLETED | OUTPATIENT
Start: 2023-02-11 | End: 2023-02-11

## 2023-02-11 RX ORDER — ONDANSETRON 4 MG/1
8 TABLET, ORALLY DISINTEGRATING ORAL EVERY 8 HOURS PRN
Status: DISCONTINUED | OUTPATIENT
Start: 2023-02-11 | End: 2023-02-12

## 2023-02-11 RX ORDER — GABAPENTIN 300 MG/1
300 CAPSULE ORAL 3 TIMES DAILY
Status: DISCONTINUED | OUTPATIENT
Start: 2023-02-11 | End: 2023-02-12

## 2023-02-11 RX ORDER — NIFEDIPINE 30 MG/1
30 TABLET, EXTENDED RELEASE ORAL DAILY
Status: DISCONTINUED | OUTPATIENT
Start: 2023-02-11 | End: 2023-02-11

## 2023-02-11 RX ORDER — SODIUM CHLORIDE, SODIUM LACTATE, POTASSIUM CHLORIDE, CALCIUM CHLORIDE 600; 310; 30; 20 MG/100ML; MG/100ML; MG/100ML; MG/100ML
INJECTION, SOLUTION INTRAVENOUS CONTINUOUS PRN
Status: DISCONTINUED | OUTPATIENT
Start: 2023-02-11 | End: 2023-02-12 | Stop reason: HOSPADM

## 2023-02-11 RX ORDER — CITALOPRAM HYDROBROMIDE 20 MG/1
20 TABLET ORAL DAILY
Status: DISCONTINUED | OUTPATIENT
Start: 2023-02-11 | End: 2023-02-12

## 2023-02-11 RX ORDER — NALBUPHINE HYDROCHLORIDE 10 MG/ML
2.5-5 INJECTION, SOLUTION INTRAMUSCULAR; INTRAVENOUS; SUBCUTANEOUS EVERY 6 HOURS PRN
Status: DISCONTINUED | OUTPATIENT
Start: 2023-02-11 | End: 2023-02-12

## 2023-02-11 RX ORDER — FENTANYL CITRATE 50 UG/ML
INJECTION, SOLUTION INTRAMUSCULAR; INTRAVENOUS
Status: COMPLETED
Start: 2023-02-11 | End: 2023-02-11

## 2023-02-11 RX ORDER — ONDANSETRON 4 MG/1
4 TABLET, ORALLY DISINTEGRATING ORAL EVERY 6 HOURS PRN
Status: DISCONTINUED | OUTPATIENT
Start: 2023-02-11 | End: 2023-02-12 | Stop reason: HOSPADM

## 2023-02-11 RX ORDER — LORAZEPAM 0.5 MG/1
0.5 TABLET ORAL EVERY 6 HOURS
Status: DISCONTINUED | OUTPATIENT
Start: 2023-02-11 | End: 2023-02-11 | Stop reason: DRUGHIGH

## 2023-02-11 RX ORDER — LORAZEPAM 0.5 MG/1
0.5 TABLET ORAL EVERY 6 HOURS PRN
Status: DISCONTINUED | OUTPATIENT
Start: 2023-02-11 | End: 2023-02-12

## 2023-02-11 RX ORDER — ACETAMINOPHEN 325 MG/1
650 TABLET ORAL EVERY 6 HOURS PRN
Status: DISCONTINUED | OUTPATIENT
Start: 2023-02-11 | End: 2023-02-11

## 2023-02-11 RX ADMIN — FENTANYL CITRATE 100 MCG: 50 INJECTION, SOLUTION INTRAMUSCULAR; INTRAVENOUS at 23:26

## 2023-02-11 RX ADMIN — SODIUM CHLORIDE, POTASSIUM CHLORIDE, SODIUM LACTATE AND CALCIUM CHLORIDE 75 ML/HR: 600; 310; 30; 20 INJECTION, SOLUTION INTRAVENOUS at 17:31

## 2023-02-11 RX ADMIN — Medication: at 15:10

## 2023-02-11 RX ADMIN — CITALOPRAM HYDROBROMIDE 20 MG: 20 TABLET ORAL at 10:00

## 2023-02-11 RX ADMIN — Medication 25 MCG: at 03:30

## 2023-02-11 RX ADMIN — Medication 25 MCG: at 10:55

## 2023-02-11 RX ADMIN — HYDROXYZINE HYDROCHLORIDE 25 MG: 25 TABLET ORAL at 21:23

## 2023-02-11 RX ADMIN — BETAMETHASONE SODIUM PHOSPHATE AND BETAMETHASONE ACETATE 12 MG: 3; 3 INJECTION, SUSPENSION INTRA-ARTICULAR; INTRALESIONAL; INTRAMUSCULAR at 16:44

## 2023-02-11 RX ADMIN — AMITRIPTYLINE HYDROCHLORIDE 25 MG: 25 TABLET, FILM COATED ORAL at 21:18

## 2023-02-11 RX ADMIN — NIFEDIPINE 60 MG: 30 TABLET, FILM COATED, EXTENDED RELEASE ORAL at 10:00

## 2023-02-11 RX ADMIN — Medication 2 MILLI-UNITS/MIN: at 17:57

## 2023-02-11 RX ADMIN — Medication 25 MCG: at 01:33

## 2023-02-11 RX ADMIN — GABAPENTIN 300 MG: 300 CAPSULE ORAL at 17:32

## 2023-02-11 RX ADMIN — SODIUM CHLORIDE, POTASSIUM CHLORIDE, SODIUM LACTATE AND CALCIUM CHLORIDE 999 ML/HR: 600; 310; 30; 20 INJECTION, SOLUTION INTRAVENOUS at 06:09

## 2023-02-11 RX ADMIN — ONDANSETRON 8 MG: 4 TABLET, ORALLY DISINTEGRATING ORAL at 10:00

## 2023-02-11 RX ADMIN — GABAPENTIN 300 MG: 300 CAPSULE ORAL at 10:02

## 2023-02-11 RX ADMIN — ONDANSETRON 4 MG: 2 INJECTION INTRAMUSCULAR; INTRAVENOUS at 22:50

## 2023-02-11 RX ADMIN — ONDANSETRON 4 MG: 2 INJECTION INTRAMUSCULAR; INTRAVENOUS at 16:38

## 2023-02-11 ASSESSMENT — ACTIVITIES OF DAILY LIVING (ADL)
ADLS_ACUITY_SCORE: 20

## 2023-02-11 NOTE — PROVIDER NOTIFICATION
02/11/23 0640   Provider Notification   Provider Name/Title Dr. Hancock   Method of Notification Phone   Request Evaluate - Remote   Notification Reason Decels;Uterine Activity   Updated MD that 0530 dose of cytotec was held due to recurrent late and variable decels with moderate variability still present.  Patient was repositioned without resolution of decels, so IV fluid bolus was administered.  Intermittent decels still present with moderate variability and accelerations.  Contractions occurring every 1-4 minutes, moderately palpable.  Patient feels contractions as tolerable cramping.  Orders received to hold further cytotec for now and observe to allow FHR to recover further.

## 2023-02-11 NOTE — ANESTHESIA PREPROCEDURE EVALUATION
Anesthesia Pre-Procedure Evaluation    Patient: Samara Oropeza   MRN: [id number] : 1995        Procedure : * No procedures listed *          Past Medical History:   Diagnosis Date     Anemia      Anxiety      Arthritis      Behcet's disease (H)      Cervical adenitis May 2010     Chronic abdominal pain      Constipation, chronic      Fibromyalgia      Gastro-oesophageal reflux disease      Gastroparesis      Irregular heart beat     tachycardia, has had workup     Migraines      Neuromuscular disorder (H)     fibramyalgia     Palpitations      PONV (postoperative nausea and vomiting)      Seizure (H)      Seizures (H)     unknown etiology     Syncope      Tourette's       Past Surgical History:   Procedure Laterality Date     ARTHROSCOPY ANKLE, OPEN REPAIR LIGAMENT, COMBINED Left 2019    Procedure: LEFT ANKLE ARTHROSCOPY WITH LIGAMENT REPAIR;  Surgeon: Andres Johnson DPM;  Location:  OR     ARTHROSCOPY ANKLE, REPAIR LIGAMENT Left 2019    Procedure: Ankle arthroscopy and sinus tarsi evacuation, ligament repair, left lower extremity;  Surgeon: Andres Johnson DPM;  Location:  OR     ARTHROSCOPY KNEE WITH PATELLAR REALIGNMENT  2013    Procedure: ARTHROSCOPY KNEE WITH PATELLAR REALIGNMENT;  Left Knee Arthroscopy, Medial Patellofemoral Ligament Reconstruction with Allograft  ;  Surgeon: Jennifer Acevedo MD;  Location: [facility]     COLONOSCOPY       DENTAL SURGERY      Teeth removal     ENDOSCOPY UPPER, COLONOSCOPY, COMBINED       HC ESOPH/GAS REFLUX TEST W NASAL IMPED >1 HR N/A 2/15/2017    Procedure: ESOPHAGEAL IMPEDENCE FUNCTION TEST WITH 24 HOUR PH GREATER THAN 1 HOUR;  Surgeon: Timothy Matta MD;  Location: [facility]     IR PICC PLACEMENT > 5 YRS OF AGE  3/13/2019     IR UPPER EXTREMITY VENOGRAM LEFT  2020     IRRIGATION AND DEBRIDEMENT FOOT, COMBINED Left 3/12/2019    Procedure: COMBINED IRRIGATION AND DEBRIDEMENT LEFT ANKLE;  Surgeon: Adalberto  Micha Denis MD;  Location: [facility]     IRRIGATION AND DEBRIDEMENT LOWER EXTREMITY, COMBINED Left 5/7/2019    Procedure: 1.  Excision of wound down to and including deep fascia, less than 20 cm2.  2.  Irrigation and debridement, left ankle.;  Surgeon: Andres Johnson DPM;  Location: [facility] OR     PICC INSERTION Left 05/05/2019    4Fr - 45cm (5cm external), Basilic vein, SVC RA junction      Allergies   Allergen Reactions     Amoxil [Penicillins] Rash     Dad unsure of reaction.     Bee Venom Anaphylaxis     Bioflavonoids Anaphylaxis     Citrus Anaphylaxis     All Hobbs     Contrast Dye Rash     [place]rast Media [place] [facility] [place] Contrast Media [place] [facility] [place]  NOTE: this is a contrast media oral with iodine. Premedicate with methylpred standard for IV contrast, request barium contrast for oral contrast.     Diagnostic X-Ray Materials Hives and Rash     Contrast Media Ready-Box [facility] [place] Contrast Media Ready[place] [facility] [place].  NOTE: this is a contrast media oral with iodine. Premedicate with methylpred standard for IV contrast, request barium contrast for oral contrast.     Pineapple Anaphylaxis, Difficulty breathing and Rash     Reglan [Metoclopramide] Other (See Comments)     IV dose only, in ER, rapid heart rate.     Ace Inhibitors      Difficulty in breathing and GI upset     Amitiza [Lubiprostone] Nausea and Vomiting     Amoxicillin-Pot Clavulanate      Midazolam Unknown     parent states that when pt takes this medication, she wakes up being very violent .     Other [No Clinical Screening - See Comments]      Bleech/ chest tightness, itchy throat, swollen tongue, hives     Tizanidine Other (See Comments)     Confusion, back pain, photophobia, abdominal pain, shaking, anxious       Versed      Coming out of pelvic exam at age of 5, was kicking and screaming when coming out of the versed.     Adhesive Tape Rash     Azithromycin Hives and Rash     Cephalexin Itching and Rash      Sulfa Drugs Rash     Skin scarring      Social History     Tobacco Use     Smoking status: Never     Smokeless tobacco: Never   Substance Use Topics     Alcohol use: Not Currently     Alcohol/week: 1.0 standard drink     Types: 1 Standard drinks or equivalent per week     Comment: rarely      Wt Readings from Last 1 Encounters:   02/10/23 87.1 kg (192 lb)        Anesthesia Evaluation   Pt has not had prior anesthetic     History of anesthetic complications  - PONV.      ROS/MED HX  ENT/Pulmonary:  - neg pulmonary ROS     Neurologic:  - neg neurologic ROS     Cardiovascular:  - neg cardiovascular ROS     METS/Exercise Tolerance:     Hematologic:  - neg hematologic  ROS     Musculoskeletal:       GI/Hepatic:  - neg GI/hepatic ROS   (+) GERD,     Renal/Genitourinary:       Endo:  - neg endo ROS     Psychiatric/Substance Use:  - neg psychiatric ROS     Infectious Disease:       Malignancy:       Other:            Physical Exam    Airway        Mallampati: II   TM distance: > 3 FB   Neck ROM: full   Mouth opening: > 3 cm    Respiratory Devices and Support         Dental  no notable dental history         Cardiovascular   cardiovascular exam normal          Pulmonary   pulmonary exam normal                OUTSIDE LABS:  CBC:   Lab Results   Component Value Date    WBC 10.2 02/11/2023    WBC 7.7 02/10/2023    HGB 9.8 (L) 02/11/2023    HGB 10.0 (L) 02/10/2023    HCT 31.9 (L) 02/11/2023    HCT 31.9 (L) 02/10/2023     02/11/2023     02/10/2023     BMP:   Lab Results   Component Value Date     09/07/2020     07/27/2020    POTASSIUM 3.9 09/07/2020    POTASSIUM 3.6 07/27/2020    CHLORIDE 106 09/07/2020    CHLORIDE 106 07/27/2020    CO2 27 09/07/2020    CO2 22 07/27/2020    BUN 7 09/07/2020    BUN 6 (L) 07/27/2020    CR 0.59 02/11/2023    CR 0.59 02/10/2023     (H) 09/07/2020     (H) 07/27/2020     COAGS:   Lab Results   Component Value Date    INR 1.06 02/25/2020     POC:   Lab Results    Component Value Date     (H) 05/08/2019    HCG Negative 09/25/2019    HCGS Negative 09/07/2020     HEPATIC:   Lab Results   Component Value Date    ALBUMIN 4.1 10/28/2022    PROTTOTAL 7.9 07/27/2020    ALT 17 02/11/2023    AST 26 02/11/2023    GGT 34 (H) 10/19/2013    ALKPHOS 103 07/27/2020    BILITOTAL 0.5 07/27/2020     OTHER:   Lab Results   Component Value Date    LACT 1.1 03/30/2019    HSANKAR 9.8 09/07/2020    MAG 1.9 04/01/2019    LIPASE 106 03/30/2019    AMYLASE 63 01/31/2007    TSH 0.77 09/07/2020    T4 1.26 01/31/2007    CRP <2.9 09/30/2020    SED 17 10/28/2022       Anesthesia Plan    ASA Status:  2      Anesthesia Type: Epidural.              Consents    Anesthesia Plan(s) and associated risks, benefits, and realistic alternatives discussed. Questions answered and patient/representative(s) expressed understanding.    - Discussed:     - Discussed with:  Patient         Postoperative Care            Comments:           neg OB ROS.       Michael Rodriguez MD

## 2023-02-11 NOTE — PROVIDER NOTIFICATION
02/11/23 1730   Provider Notification   Provider Name/Title Dr. Frances   Method of Notification At Bedside   Notification Reason SVE;Labor Status     Yvrose at bedside SVE 1/80-90/0. VORB to begin oxytocin augmentation. Questions encouraged and answered. Patient agreeable to plan.

## 2023-02-11 NOTE — ANESTHESIA PROCEDURE NOTES
Epidural catheter Procedure Note    Pre-Procedure   Staff -        Anesthesiologist:  Michael Rodriguez MD       Performed By: anesthesiologist  Procedure Documentation  Procedure: epidural catheter   Comments:  Pre-Procedure  Performed by Michael Rodriguez MD  Location: OB.      PreAnesthestic Checklist: patient identified, IV checked, risks and benefits discussed, informed consent obtained, monitors and equipment checked, pre-op evaluation and at physician/surgeon's request.    Timeout   Correct Patient: Yes  Correct Procedure: Epidural catheter placement  Correct Site: Yes   Correct Position: Yes    Procedure Documentation  Procedure:   Epidural catheter block for Labor    Patient currently in labor and she and OBMD request a labor epidural to control her labor pains. Patient was interviewed and examined. Procedure and risks including but not limited to bleeding, infection, nerve injury, paralysis, PDPH, and inadequate block requiring intervention discussed with patient. Questions answered. This epidural is to be placed in anticipation of vaginal delivery.  She consents to the epidural procedure.  Time-out was performed.  I or my partners remain immediately available for management of any issues or complications and will monitor at appropriate intervals.  Procedure: Patient sitting. Betadine prep x 3. Sterile drape applied.  Lidocaine 1%  local infiltration at L 3-4.  17 G. Tuohy needle at L3-4 by loss of resistance into epidural space.  No CSF, paresthesia or blood. 1.5 % Lidocaine with 1:200,000 Epinephrine 5cc test dose. Then 0.25% bupivicaine 10 cc with NS 5 cc.  Epidural catheter inserted w/o resistance to 5 cm in epidural space.  Aspiration negative for blood and CSF.   Negative for neuro change, paresthesia or symptoms of intravascular injection or intrathecal injection.  Infusion orders written and infusion of 0.125% bupivicaine 15cc per hour started.    Michael Rodriguez MD               FOR [facility] ([facility]) ONLY:   Pain Team Contact information: please page the Pain Team Via [facility]. Search \"Pain\". During daytime hours, please page the attending first. At night please page the resident first.    "

## 2023-02-11 NOTE — PROGRESS NOTES
"LABOR NOTE    Subjective: Reports increased pain despite epidural. Anesthesia working to increase comfort level. At that point, will repeat check, make plan for moving forward. PreE and magnesium discussed, as pt's reflexes are becoming more brisk, and UOP is decreasing.    Objective:  BP (!) 145/95   Temp 98.1  F (36.7  C) (Oral)   Resp 18   Ht 1.6 m (5' 3\")   Wt 87.1 kg (192 lb)   SpO2 99%   BMI 34.01 kg/m     FHT: category 1  [place]: q 5 min  SVE: not repeated    Assessment and Plan: Ms. Samara Oropeza is a 26y/o  with SIUP 36w5, admitted for cervical ripening/IOL due to chtn with superimposed preE without SF    CHTN with superimposed preE w/o SF  - HELLP labs - pr/cr ratio 1.68, rest WNL              - platelets noted trending down              - repeat labs every 12 hrs  - currently BP's mild range  - procardia to 60 mg XL  - if severe range BP's will start algorithm with labetalol and start magnesium sulfate for seizure ppx  -bmtz x2      Fetal well being  - BMZ given 2/10- 1630 hrs  - prolonged monitoring  - currently reactive NST  - NICU consulted     COLLEEN  - continue PO iron  - IV iron discussed and ordered     Chronic LUE thrombosis  - SCDs ordered  - will held off Lovenox and resume postpartum     TAHIR  - SW PP    Lauren Frances MD  "

## 2023-02-11 NOTE — PROVIDER NOTIFICATION
02/11/23 1550   Provider Notification   Provider Name/Title Dr. Frances   Method of Notification At Bedside   Notification Reason Labor Status;Patient Request   Dr. Frances at bedside per patient request to answer questions about VTE history, anxieties around pushing, and fear of tearing. Education provided. Patient expressed feeling more comfortable and appreciated the conversation. Orders for labs readjusted per QUINTEN. Yvrose provided education on Magnesium for Pre E. Questions encouraged and answered.

## 2023-02-11 NOTE — PROVIDER NOTIFICATION
02/10/23 1555   Provider Notification   Provider Name/Title Dr. Hancock   Method of Notification Phone     MD updated on lab results.     Order for admission for blood pressure monitoring overnight. MD to discuss POC with MFM.     Labetalol protocol for severe range blood pressures. Magnesium orders for if blood pressures become severe. MD will come see patient once settles. Tylenol PRN for headache. Betamethasone injection STAT.

## 2023-02-11 NOTE — PROVIDER NOTIFICATION
02/11/23 0853   Provider Notification   Provider Name/Title Dr. Frances   Method of Notification In Department   Request Evaluate-Remote     Writer updated Dr. Frances on FHR lates and variables throughout the morning since the fluid bolus. Writer updated provider that patient has increased edema, IV fluid discontinued for swelling, and repositioning for FHR. Dr. Frances updated on home medications needed to be ordered. Orders for medications placed by Yvrose.

## 2023-02-11 NOTE — PROVIDER NOTIFICATION
02/11/23 1337   Provider Notification   Provider Name/Title Dr. Frances   Method of Notification Phone   Request Evaluate - Remote     Writer updated Dr. Frances on SVE, patient feeling uncomfortable, urine output of 300 from 7am to 1330. Plan per Yvrose is expectant management.

## 2023-02-11 NOTE — PROVIDER NOTIFICATION
02/11/23 0919   Provider Notification   Provider Name/Title Dr. Frances   Method of Notification At Bedside   Dr. Frances at bedside evaluating patient contractions and FHR. SVE fingertip 0-1/70/0.

## 2023-02-11 NOTE — PROGRESS NOTES
Pt seen at bedside.  Has epidural in place.  Now C/O increased pain.  Level is at T10.  Pt given 8ml of 0.25% bupiv as re-bolus + .  No complications.    Start time:1619  End time:1625  Michael Rodriguez MD

## 2023-02-11 NOTE — PROGRESS NOTES
"LABOR NOTE    Subjective: Reports increased cramping. Labs stable. More swollen, per RN. Med rec completed. Discussed options for continued ripening, and pt prefers PO cytotec to pitocin or cervidil. Orders given    Objective:  /83   Temp 98.1  F (36.7  C) (Oral)   Resp 16   Ht 1.6 m (5' 3\")   Wt 87.1 kg (192 lb)   SpO2 99%   BMI 34.01 kg/m     FHT: category 2 (moderate variability, accels, occasional variables)  [place]: q 4-5 min  SVE: FT/70/0    Assessment and Plan: Ms. Samara Oropeza is a 28y/o  with SIUP 36w5, admitted for cervical ripening/IOL due to chtn with superimposed preE without SF    CHTN with superimposed preE w/o SF  - HELLP labs - pr/cr ratio 1.68, rest WNL              - platelets noted trending down              - repeat labs every 12 hrs  - currently BP's mild range  - procardia to 60 mg XL  - if severe range BP's will start algorithm with labetalol and start magnesium sulfate for seizure ppx  -bmtz x2      Fetal well being  - BMZ given 2/10- 1630 hrs  - prolonged monitoring  - currently reactive NST  - NICU consulted     COLLEEN  - continue PO iron  - IV iron discussed and ordered     Chronic LUE thrombosis  - SCDs ordered  - will held off Lovenox and resume postpartum     TAHIR  - SW PP    Lauren Frances MD    "

## 2023-02-11 NOTE — PROVIDER NOTIFICATION
02/11/23 1105   Provider Notification   Provider Name/Title Dr. Frances   Method of Notification In Department     Writer informed Dr. Frances of SROM in department.

## 2023-02-12 LAB
CREAT SERPL-MCNC: 0.68 MG/DL (ref 0.51–0.95)
GFR SERPL CREATININE-BSD FRML MDRD: >90 ML/MIN/1.73M2
PLATELET # BLD AUTO: 199 10E3/UL (ref 150–450)

## 2023-02-12 PROCEDURE — 250N000011 HC RX IP 250 OP 636: Performed by: OBSTETRICS & GYNECOLOGY

## 2023-02-12 PROCEDURE — 0UQMXZZ REPAIR VULVA, EXTERNAL APPROACH: ICD-10-PCS | Performed by: OBSTETRICS & GYNECOLOGY

## 2023-02-12 PROCEDURE — 250N000009 HC RX 250: Performed by: OBSTETRICS & GYNECOLOGY

## 2023-02-12 PROCEDURE — 999N000081 HC STATISTIC IP LACTATION SERVICES 31-45 MIN

## 2023-02-12 PROCEDURE — 85049 AUTOMATED PLATELET COUNT: CPT | Performed by: OBSTETRICS & GYNECOLOGY

## 2023-02-12 PROCEDURE — 36415 COLL VENOUS BLD VENIPUNCTURE: CPT | Performed by: OBSTETRICS & GYNECOLOGY

## 2023-02-12 PROCEDURE — 120N000001 HC R&B MED SURG/OB

## 2023-02-12 PROCEDURE — 250N000011 HC RX IP 250 OP 636: Performed by: ANESTHESIOLOGY

## 2023-02-12 PROCEDURE — 258N000003 HC RX IP 258 OP 636: Performed by: OBSTETRICS & GYNECOLOGY

## 2023-02-12 PROCEDURE — 999N000016 HC STATISTIC ATTENDANCE AT DELIVERY

## 2023-02-12 PROCEDURE — 88307 TISSUE EXAM BY PATHOLOGIST: CPT | Mod: TC | Performed by: OBSTETRICS & GYNECOLOGY

## 2023-02-12 PROCEDURE — 0KQM0ZZ REPAIR PERINEUM MUSCLE, OPEN APPROACH: ICD-10-PCS | Performed by: OBSTETRICS & GYNECOLOGY

## 2023-02-12 PROCEDURE — 250N000013 HC RX MED GY IP 250 OP 250 PS 637: Performed by: OBSTETRICS & GYNECOLOGY

## 2023-02-12 PROCEDURE — 88307 TISSUE EXAM BY PATHOLOGIST: CPT | Mod: 26 | Performed by: STUDENT IN AN ORGANIZED HEALTH CARE EDUCATION/TRAINING PROGRAM

## 2023-02-12 PROCEDURE — 82565 ASSAY OF CREATININE: CPT | Performed by: OBSTETRICS & GYNECOLOGY

## 2023-02-12 PROCEDURE — 722N000001 HC LABOR CARE VAGINAL DELIVERY SINGLE

## 2023-02-12 RX ORDER — HYDROXYZINE HYDROCHLORIDE 25 MG/1
25 TABLET, FILM COATED ORAL 2 TIMES DAILY PRN
Status: DISCONTINUED | OUTPATIENT
Start: 2023-02-12 | End: 2023-02-12

## 2023-02-12 RX ORDER — DOCUSATE SODIUM 100 MG/1
100 CAPSULE, LIQUID FILLED ORAL DAILY
Qty: 30 CAPSULE | Refills: 0 | Status: SHIPPED | OUTPATIENT
Start: 2023-02-12 | End: 2023-09-27

## 2023-02-12 RX ORDER — CARBOPROST TROMETHAMINE 250 UG/ML
250 INJECTION, SOLUTION INTRAMUSCULAR
Status: DISCONTINUED | OUTPATIENT
Start: 2023-02-12 | End: 2023-02-16 | Stop reason: HOSPADM

## 2023-02-12 RX ORDER — GABAPENTIN 300 MG/1
300 CAPSULE ORAL 3 TIMES DAILY
Status: DISCONTINUED | OUTPATIENT
Start: 2023-02-12 | End: 2023-02-14

## 2023-02-12 RX ORDER — IBUPROFEN 800 MG/1
800 TABLET, FILM COATED ORAL EVERY 6 HOURS PRN
Qty: 40 TABLET | Refills: 1 | Status: SHIPPED | OUTPATIENT
Start: 2023-02-12 | End: 2023-09-27

## 2023-02-12 RX ORDER — ONDANSETRON 2 MG/ML
8 INJECTION INTRAMUSCULAR; INTRAVENOUS EVERY 8 HOURS PRN
Status: DISCONTINUED | OUTPATIENT
Start: 2023-02-12 | End: 2023-02-16 | Stop reason: HOSPADM

## 2023-02-12 RX ORDER — ACETAMINOPHEN 325 MG/1
650 TABLET ORAL EVERY 4 HOURS PRN
Status: DISCONTINUED | OUTPATIENT
Start: 2023-02-12 | End: 2023-02-12 | Stop reason: DRUGHIGH

## 2023-02-12 RX ORDER — MAGNESIUM SULFATE HEPTAHYDRATE 40 MG/ML
2 INJECTION, SOLUTION INTRAVENOUS
Status: DISCONTINUED | OUTPATIENT
Start: 2023-02-12 | End: 2023-02-16 | Stop reason: HOSPADM

## 2023-02-12 RX ORDER — NIFEDIPINE 30 MG/1
30 TABLET, EXTENDED RELEASE ORAL DAILY
Status: DISCONTINUED | OUTPATIENT
Start: 2023-02-12 | End: 2023-02-12

## 2023-02-12 RX ORDER — METHYLERGONOVINE MALEATE 0.2 MG/ML
200 INJECTION INTRAVENOUS
Status: DISCONTINUED | OUTPATIENT
Start: 2023-02-12 | End: 2023-02-16 | Stop reason: HOSPADM

## 2023-02-12 RX ORDER — MAGNESIUM SULFATE IN WATER 40 MG/ML
2 INJECTION, SOLUTION INTRAVENOUS CONTINUOUS
Status: DISCONTINUED | OUTPATIENT
Start: 2023-02-12 | End: 2023-02-14

## 2023-02-12 RX ORDER — NIFEDIPINE 30 MG/1
60 TABLET, EXTENDED RELEASE ORAL DAILY
Status: DISCONTINUED | OUTPATIENT
Start: 2023-02-12 | End: 2023-02-13

## 2023-02-12 RX ORDER — ACETAMINOPHEN 325 MG/1
650 TABLET ORAL EVERY 6 HOURS PRN
Status: DISCONTINUED | OUTPATIENT
Start: 2023-02-12 | End: 2023-02-16 | Stop reason: HOSPADM

## 2023-02-12 RX ORDER — LABETALOL HYDROCHLORIDE 5 MG/ML
20-80 INJECTION, SOLUTION INTRAVENOUS EVERY 10 MIN PRN
Status: DISCONTINUED | OUTPATIENT
Start: 2023-02-12 | End: 2023-02-16 | Stop reason: HOSPADM

## 2023-02-12 RX ORDER — MISOPROSTOL 200 UG/1
400 TABLET ORAL
Status: DISCONTINUED | OUTPATIENT
Start: 2023-02-12 | End: 2023-02-16 | Stop reason: HOSPADM

## 2023-02-12 RX ORDER — ONDANSETRON 4 MG/1
8 TABLET, ORALLY DISINTEGRATING ORAL EVERY 8 HOURS PRN
Status: DISCONTINUED | OUTPATIENT
Start: 2023-02-12 | End: 2023-02-16 | Stop reason: HOSPADM

## 2023-02-12 RX ORDER — IBUPROFEN 800 MG/1
800 TABLET, FILM COATED ORAL EVERY 6 HOURS PRN
Status: DISCONTINUED | OUTPATIENT
Start: 2023-02-12 | End: 2023-02-16 | Stop reason: HOSPADM

## 2023-02-12 RX ORDER — HYDROCORTISONE 25 MG/G
CREAM TOPICAL 3 TIMES DAILY PRN
Qty: 30 G | Refills: 0 | Status: SHIPPED | OUTPATIENT
Start: 2023-02-12

## 2023-02-12 RX ORDER — MAGNESIUM SULFATE HEPTAHYDRATE 40 MG/ML
4 INJECTION, SOLUTION INTRAVENOUS ONCE
Status: COMPLETED | OUTPATIENT
Start: 2023-02-12 | End: 2023-02-12

## 2023-02-12 RX ORDER — MODIFIED LANOLIN
OINTMENT (GRAM) TOPICAL
Qty: 7 G | Refills: 3 | Status: SHIPPED | OUTPATIENT
Start: 2023-02-12

## 2023-02-12 RX ORDER — HYDROXYZINE HYDROCHLORIDE 25 MG/1
25 TABLET, FILM COATED ORAL 2 TIMES DAILY PRN
Status: DISCONTINUED | OUTPATIENT
Start: 2023-02-12 | End: 2023-02-16 | Stop reason: HOSPADM

## 2023-02-12 RX ORDER — DOCUSATE SODIUM 100 MG/1
100 CAPSULE, LIQUID FILLED ORAL DAILY
Status: DISCONTINUED | OUTPATIENT
Start: 2023-02-12 | End: 2023-02-16 | Stop reason: HOSPADM

## 2023-02-12 RX ORDER — MODIFIED LANOLIN
OINTMENT (GRAM) TOPICAL
Status: DISCONTINUED | OUTPATIENT
Start: 2023-02-12 | End: 2023-02-16 | Stop reason: HOSPADM

## 2023-02-12 RX ORDER — MISOPROSTOL 200 UG/1
800 TABLET ORAL
Status: DISCONTINUED | OUTPATIENT
Start: 2023-02-12 | End: 2023-02-16 | Stop reason: HOSPADM

## 2023-02-12 RX ORDER — NIFEDIPINE 30 MG/1
60 TABLET, EXTENDED RELEASE ORAL DAILY
Qty: 60 TABLET | Refills: 1 | Status: SHIPPED | OUTPATIENT
Start: 2023-02-12 | End: 2023-09-27

## 2023-02-12 RX ORDER — OXYTOCIN 10 [USP'U]/ML
10 INJECTION, SOLUTION INTRAMUSCULAR; INTRAVENOUS
Status: DISCONTINUED | OUTPATIENT
Start: 2023-02-12 | End: 2023-02-16 | Stop reason: HOSPADM

## 2023-02-12 RX ORDER — TRANEXAMIC ACID 10 MG/ML
1 INJECTION, SOLUTION INTRAVENOUS EVERY 30 MIN PRN
Status: DISCONTINUED | OUTPATIENT
Start: 2023-02-12 | End: 2023-02-16 | Stop reason: HOSPADM

## 2023-02-12 RX ORDER — MAGNESIUM SULFATE HEPTAHYDRATE 40 MG/ML
4 INJECTION, SOLUTION INTRAVENOUS
Status: DISCONTINUED | OUTPATIENT
Start: 2023-02-12 | End: 2023-02-16 | Stop reason: HOSPADM

## 2023-02-12 RX ORDER — ENOXAPARIN SODIUM 100 MG/ML
40 INJECTION SUBCUTANEOUS EVERY 24 HOURS
Qty: 16 ML | Refills: 1 | Status: SHIPPED | OUTPATIENT
Start: 2023-02-12 | End: 2023-09-27

## 2023-02-12 RX ORDER — BISACODYL 10 MG
10 SUPPOSITORY, RECTAL RECTAL DAILY PRN
Status: DISCONTINUED | OUTPATIENT
Start: 2023-02-12 | End: 2023-02-16 | Stop reason: HOSPADM

## 2023-02-12 RX ORDER — ENOXAPARIN SODIUM 100 MG/ML
40 INJECTION SUBCUTANEOUS EVERY 24 HOURS
Status: DISCONTINUED | OUTPATIENT
Start: 2023-02-12 | End: 2023-02-16 | Stop reason: HOSPADM

## 2023-02-12 RX ORDER — HYDROCORTISONE 25 MG/G
CREAM TOPICAL 3 TIMES DAILY PRN
Status: DISCONTINUED | OUTPATIENT
Start: 2023-02-12 | End: 2023-02-16 | Stop reason: HOSPADM

## 2023-02-12 RX ORDER — HYDRALAZINE HYDROCHLORIDE 20 MG/ML
10 INJECTION INTRAMUSCULAR; INTRAVENOUS
Status: DISCONTINUED | OUTPATIENT
Start: 2023-02-12 | End: 2023-02-16 | Stop reason: HOSPADM

## 2023-02-12 RX ORDER — CITALOPRAM HYDROBROMIDE 20 MG/1
20 TABLET ORAL DAILY
Status: DISCONTINUED | OUTPATIENT
Start: 2023-02-12 | End: 2023-02-16 | Stop reason: HOSPADM

## 2023-02-12 RX ORDER — ALBUTEROL SULFATE 90 UG/1
2 AEROSOL, METERED RESPIRATORY (INHALATION) EVERY 6 HOURS PRN
Status: DISCONTINUED | OUTPATIENT
Start: 2023-02-12 | End: 2023-02-16 | Stop reason: HOSPADM

## 2023-02-12 RX ORDER — OXYTOCIN/0.9 % SODIUM CHLORIDE 30/500 ML
340 PLASTIC BAG, INJECTION (ML) INTRAVENOUS CONTINUOUS PRN
Status: DISCONTINUED | OUTPATIENT
Start: 2023-02-12 | End: 2023-02-16 | Stop reason: HOSPADM

## 2023-02-12 RX ORDER — MAGNESIUM SULFATE HEPTAHYDRATE 500 MG/ML
10 INJECTION, SOLUTION INTRAMUSCULAR; INTRAVENOUS
Status: DISCONTINUED | OUTPATIENT
Start: 2023-02-12 | End: 2023-02-16 | Stop reason: HOSPADM

## 2023-02-12 RX ORDER — CALCIUM GLUCONATE 94 MG/ML
1 INJECTION, SOLUTION INTRAVENOUS
Status: DISCONTINUED | OUTPATIENT
Start: 2023-02-12 | End: 2023-02-16 | Stop reason: HOSPADM

## 2023-02-12 RX ORDER — SODIUM CHLORIDE, SODIUM LACTATE, POTASSIUM CHLORIDE, CALCIUM CHLORIDE 600; 310; 30; 20 MG/100ML; MG/100ML; MG/100ML; MG/100ML
10-125 INJECTION, SOLUTION INTRAVENOUS CONTINUOUS
Status: DISCONTINUED | OUTPATIENT
Start: 2023-02-12 | End: 2023-02-14

## 2023-02-12 RX ADMIN — SODIUM CHLORIDE, POTASSIUM CHLORIDE, SODIUM LACTATE AND CALCIUM CHLORIDE 75 ML/HR: 600; 310; 30; 20 INJECTION, SOLUTION INTRAVENOUS at 22:27

## 2023-02-12 RX ADMIN — MISOPROSTOL 800 MCG: 200 TABLET ORAL at 02:59

## 2023-02-12 RX ADMIN — SODIUM CHLORIDE, POTASSIUM CHLORIDE, SODIUM LACTATE AND CALCIUM CHLORIDE 100 ML/HR: 600; 310; 30; 20 INJECTION, SOLUTION INTRAVENOUS at 06:49

## 2023-02-12 RX ADMIN — ACETAMINOPHEN 650 MG: 325 TABLET ORAL at 14:41

## 2023-02-12 RX ADMIN — ONDANSETRON 8 MG: 4 TABLET, ORALLY DISINTEGRATING ORAL at 19:45

## 2023-02-12 RX ADMIN — ONDANSETRON 8 MG: 2 INJECTION INTRAMUSCULAR; INTRAVENOUS at 09:41

## 2023-02-12 RX ADMIN — IBUPROFEN 800 MG: 800 TABLET, FILM COATED ORAL at 15:51

## 2023-02-12 RX ADMIN — AMITRIPTYLINE HYDROCHLORIDE 25 MG: 25 TABLET, FILM COATED ORAL at 21:59

## 2023-02-12 RX ADMIN — ENOXAPARIN SODIUM 40 MG: 40 INJECTION SUBCUTANEOUS at 19:38

## 2023-02-12 RX ADMIN — SODIUM CHLORIDE, POTASSIUM CHLORIDE, SODIUM LACTATE AND CALCIUM CHLORIDE 75 ML/HR: 600; 310; 30; 20 INJECTION, SOLUTION INTRAVENOUS at 09:36

## 2023-02-12 RX ADMIN — IBUPROFEN 800 MG: 800 TABLET, FILM COATED ORAL at 21:59

## 2023-02-12 RX ADMIN — MAGNESIUM SULFATE HEPTAHYDRATE 2 G/HR: 40 INJECTION, SOLUTION INTRAVENOUS at 05:16

## 2023-02-12 RX ADMIN — ACETAMINOPHEN 650 MG: 325 TABLET ORAL at 08:59

## 2023-02-12 RX ADMIN — NIFEDIPINE 60 MG: 30 TABLET, FILM COATED, EXTENDED RELEASE ORAL at 08:59

## 2023-02-12 RX ADMIN — CARBOPROST TROMETHAMINE 250 MCG: 250 INJECTION, SOLUTION INTRAMUSCULAR at 03:16

## 2023-02-12 RX ADMIN — MAGNESIUM SULFATE HEPTAHYDRATE 4 G: 4 INJECTION, SOLUTION INTRAVENOUS at 04:44

## 2023-02-12 RX ADMIN — Medication 340 ML/HR: at 02:52

## 2023-02-12 RX ADMIN — CITALOPRAM HYDROBROMIDE 20 MG: 20 TABLET ORAL at 08:59

## 2023-02-12 RX ADMIN — GABAPENTIN 300 MG: 300 CAPSULE ORAL at 08:59

## 2023-02-12 RX ADMIN — Medication: at 02:37

## 2023-02-12 RX ADMIN — DOCUSATE SODIUM 100 MG: 100 CAPSULE, LIQUID FILLED ORAL at 08:59

## 2023-02-12 ASSESSMENT — ACTIVITIES OF DAILY LIVING (ADL)
ADLS_ACUITY_SCORE: 20

## 2023-02-12 NOTE — PROVIDER NOTIFICATION
02/12/23 0945   Provider Notification   Provider Name/Title Dr. Hancock  (x 2)   Method of Notification Phone   Request Evaluate-Remote   Notification Reason Medication Request   Requested order for IV Zofran 8mg PRN and Otezla 30 mg BID. This is patient's current dose of Otezla that she was taking during pregnancy and up until delivery. MD agreed. Writer called and spoke to pharmacist, who will reinstate the previous order for Otezla. Patient states MFM and her Rheumatologist said it was safe to take Otezla during pregnancy and after delivery, and that it is safe for baby. Writer and Pediatrician spoke with patient regarding Ernie Medications and Mothers' Milk guide which states that breastfeeding is not recommended while taking this medication due to risks to infant are unknown. Infant is taking in donor milk for feedings. Writer offered for patient to pump and save EBM if she wanted more time to decide if she would want to continue breastfeeding. Patient declined. Patient states that even knowing the risks, she would still like to breastfeed and is aware that it could be harmful to her baby.

## 2023-02-12 NOTE — PROGRESS NOTES
"LABOR NOTE    Subjective: Pt more comfortable with epidural. IUPC placed. Pitocin 4mU/min. Repositioned to R lat. Contractions appear close to adequate. 1800 labs stable.    Objective:  /79   Temp 98  F (36.7  C) (Oral)   Resp 18   Ht 1.6 m (5' 3\")   Wt 87.1 kg (192 lb)   SpO2 98%   BMI 34.01 kg/m     FHT: cat 1  [place]: q 2-3 min  SVE: /0    Assessment and Plan: Ms. Samara Oropeza is a 26y/o  with SIUP 36w5, admitted for cervical ripening/IOL due to chtn with superimposed preE without SF     CHTN with superimposed preE w/o SF  - HELLP labs - pr/cr ratio 1.68, rest WNL              - platelets noted trending down              - repeat labs every 12 hrs  - currently BP's mild range  - procardia to 60 mg XL  - if severe range BP's will start algorithm with labetalol and start magnesium sulfate for seizure ppx  -bmtz x2      Fetal well being  - BMZ given 2/10- 1630 hrs  - prolonged monitoring  - currently reactive NST  - NICU consulted     COLLEEN  - continue PO iron  - IV iron discussed and ordered     Chronic LUE thrombosis  - SCDs ordered  - will held off Lovenox and resume postpartum     TAHIR  - SW PP     Lauren Frances MD    "

## 2023-02-12 NOTE — ANESTHESIA POSTPROCEDURE EVALUATION
Patient: Samara Oropeza    Procedure: * No procedures listed *       Anesthesia Type:  Epidural    Note:  Disposition: Inpatient   Postop Pain Control: Uneventful            Sign Out: Well controlled pain   PONV: No   Neuro/Psych: Uneventful            Sign Out: Acceptable/Baseline neuro status   Airway/Respiratory: Uneventful            Sign Out: Acceptable/Baseline resp. status   CV/Hemodynamics: Uneventful            Sign Out: Acceptable CV status   Other NRE: NONE   DID A NON-ROUTINE EVENT OCCUR? No    Event details/Postop Comments:    Patient doing well.  Residual neuraxial block resolved with no reported numbness or paresthesias.  Ambulating, voiding, and eating without difficulty. Denies positional headache.  Pain well controlled. No bowel or bladder changes. Minimal back discomfort at epidural site. No apparent epidural complications.  Questions encouraged and answered.             Last vitals:  Vitals:    02/12/23 0600 02/12/23 0615 02/12/23 0731   BP: 127/76  (!) 137/99   Pulse:   110   Resp:  18 18   Temp:   98  F (36.7  C)   SpO2: 97% 97% 100%       Electronically Signed By: Gilbert Miramontes MD  February 12, 2023  12:12 PM

## 2023-02-12 NOTE — PROGRESS NOTES
"[facility] OB Postpartum Note    S:  Samara Oropeza feels tired this morning. Was not able to sleep last night. Pain control adequate. Lochia minimal. Voiding. Breast feeding. Mood Good. Magnesium running at 2g/h.BP normal range, on magnesium and nifedipine xl 60mg daily.    O:  Vitals were reviewed  Blood pressure 127/76, temperature 98.4  F (36.9  C), temperature source Oral, resp. rate 18, height 1.6 m (5' 3\"), weight 87.1 kg (192 lb), SpO2 97 %, unknown if currently breastfeeding.      General: healthy, alert and no distress  Abd: soft, appropriately tender, fundus firm  Legs: Non-tender, 2+ pitting edema  Ext: reflexes 3/4 throughout    No results found for: RH  Rubella: immune    Assessment and Plan:   Postpartum Day #0, status post vaginal delivery, doing well. Admitted with SIUP 36w5, admitted for cervical ripening/IOL due to chtn with superimposed preE without SF  -- Routine care-magnesium until 0400    CHTN with superimposed preE w/o SF  - HELLP labs - pr/cr ratio 1.68, rest WNL              - platelets noted trending down              - repeat labs every 12 hrs  - currently BP's mild range  - procardia to 60 mg XL  - magnesium started at 0400, postpartum  -bmtz x2      Fetal well being  - BMZ given 2/10-11 1630 hrs  - rooming-in, not in NICU  -SGA     COLLEEN  - continue PO iron  - IV iron discussed and ordered     Chronic LUE thrombosis  - SCDs ordered  - orders to resume Lovenox for 6 weeks postpartum (home rx written)     TAHIR  - SW PP    Lauren Frances MD    "

## 2023-02-12 NOTE — PLAN OF CARE
's/90s. Denies any headache, no Visual problems,  no epigastric pain . + 3 reflexes, No clonus. Ibuprofen given for aamir discomfort. Ice pack and nirmala pad applied .    Assisted to bathroom. Pt noted be shaky (mild)  when moving  . Denies any Dizziness or lightheaded. Pt able to walk safely to bathroom by standby assist  and moving slowly. Pt states that the shakiness started yesterday while in LD  and is about the same today and is Not worsening .  Instructed to call if needs to get up for assistance.     Encouraged frequent pumping . Lactation saw mom and baby this evening.

## 2023-02-12 NOTE — L&D DELIVERY NOTE
Samara Oropeza is a 27 year old  who was admitted at 1600 on 2/10/23. She presented from clinic due to recurrent mild range BPs, and was found to be preeclamptic. She was noted to be 0 cm dilated. This pregnancy was complicated by chronic hypertension, superimposed preeclampsia, IUGR, multiple mental health medications/diagnoses, hx chronic superficial thrombophlebitis, migraines, Behcet's, IBS-C. GBS negative, Rh positive. She was admitted to Labor and Delivery. Betamethasone was given 2/10-. Cervical ripening was performed with oral cytotec. Labor progressed, and epidural was administered. Pitocin augmentation was begun, as contractions were not adequate. IUPC placed. Rupture of membranes was spontaneous, and clear fluid was noted. She progressed to complete. She pushed effectively, for approximately 13 minutes. At 0251, she experienced  of a vigorous 2100g female , from a direct OA position, over a right periurethral and a second degree perineal laceration. Repair was performed with 3.0 vicryl.  APGARS 7/9. Pitocin was begun after delivery of the baby. The cord was cut at 120+ seconds, as it had ceased pulsing. A three vessel cord was noted. The placenta delivered spontaneously, and found to appear intact on inspection. Mild uterine atony noted. Hemabate x1 given, along with rectal cytotec. QBL 277mL. Postpartum, magnesium sulfate was started, per normal protocol.      Lauren Frances MD on 2023 at 4:14 AM

## 2023-02-12 NOTE — PLAN OF CARE
Patient has been up to void x 2 with SBA. Patient has shaky legs. Denies feeling dizzy or lightheaded. Denies headache, visual changes and SOB, but positive for right upper quadrant pain. Hyper-reflexes, no clonus. Encouraged quiet room and limiting visitors. Patient agreed. Patient and FOB were discussing who/when visitors could come. Patient appeared upset by this. Writer intervened asking for conversation to be discussed at a later time due to patient with shaking legs, doing STS with infant and appearing overwhelmed. Patient is taking Tylenol for general discomfort for low back pain, abdomen and uterine cramping, RUQ pain, leg discomfort and swollen feet. Pillows will be used to help elevate LE's. Patient aware that Ibuprofen is available. Will bring in Tucks and ice packs for patient to try for perineum discomfort. Vaginal bleeding scant. Bonding behaviors noted with infant. Encouraged food and fluids.

## 2023-02-12 NOTE — PROGRESS NOTES
"LABOR NOTE    Subjective: Feeling more pain. Anesthesia present to replace epidural. MVUs intermittently adequate. Pit 4mU/min.    Objective:  /87   Temp 97.6  F (36.4  C) (Oral)   Resp 18   Ht 1.6 m (5' 3\")   Wt 87.1 kg (192 lb)   SpO2 99%   BMI 34.01 kg/m     FHT: category 1  [place]: q 2-4 min  SVE: 80/0    Assessment and Plan: Ms. Samara Oropeza is a 26y/o  with SIUP 36w5, admitted for cervical ripening/IOL due to chtn with superimposed preE without SF     CHTN with superimposed preE w/o SF  - HELLP labs - pr/cr ratio 1.68, rest WNL              - platelets noted trending down              - repeat labs every 12 hrs  - currently BP's mild range  - procardia to 60 mg XL  - if severe range BP's will start algorithm with labetalol and start magnesium sulfate for seizure ppx  -bmtz x2      Fetal well being  - BMZ given 2/10- 1630 hrs  - prolonged monitoring  - currently reactive NST  - NICU consulted     COLLEEN  - continue PO iron  - IV iron discussed and ordered     Chronic LUE thrombosis  - SCDs ordered  - will held off Lovenox and resume postpartum     TAHIR  - SW PP     Lauren Frances MD    "

## 2023-02-12 NOTE — PROVIDER NOTIFICATION
"   02/12/23 1241   Provider Notification   Provider Name/Title Dr. Hancock   Method of Notification Phone   Request Evaluate-Remote   Notification Reason Status Update;Vital Signs Change     Updated MD on B/P 143/95 and tachycardic at 115. Offers complaints of mild RUQ discomfort, but states, \"It is much better\". MD states no med changes at this time, but to monitor B/P Q 2 hours and call back if again if systolic > 140's or for any increased symptoms.  "

## 2023-02-12 NOTE — PROGRESS NOTES
"LABOR NOTE    Subjective: Reports significant discomfort, \"feeling everything!\". She is very frightened by the sensations that remain, and anesthesia to be updated. MVUs adequate. Pitocin 4mU/min.    Objective:  /86   Temp 97.6  F (36.4  C) (Oral)   Resp 18   Ht 1.6 m (5' 3\")   Wt 87.1 kg (192 lb)   SpO2 96%   BMI 34.01 kg/m     FHT: category 1  [place]: q 2-4 min  SVE: 5/80/0    Assessment and Plan: Ms. Samara Oropeza is a 28y/o  with SIUP 36w5, admitted for cervical ripening/IOL due to chtn with superimposed preE without SF     CHTN with superimposed preE w/o SF  - HELLP labs - pr/cr ratio 1.68, rest WNL              - platelets noted trending down              - repeat labs every 12 hrs  - currently BP's mild range  - procardia to 60 mg XL  - if severe range BP's will start algorithm with labetalol and start magnesium sulfate for seizure ppx  -bmtz x2      Fetal well being  - BMZ given 2/10- 1630 hrs  - prolonged monitoring  - currently reactive NST  - NICU consulted     COLLEEN  - continue PO iron  - IV iron discussed and ordered     Chronic LUE thrombosis  - SCDs ordered  - will held off Lovenox and resume postpartum     TAHIR  - SW PP     Lauren Frances MD on 2023 at 1:54 AM    "

## 2023-02-12 NOTE — DISCHARGE SUMMARY
[facility] Discharge Summary    Samara Oropeza MRN# [id number]   Age: 27 year old YOB: 1995     Date of Admission:  2/10/2023  Date of Discharge::  2/15/2023   Admitting Physician:  Rocio Hancock MD  Discharge Physician:  Mikaela Bailey MD          Admission Diagnoses:   Superimposed pre-E with SF   Migraines  Chronic LUE thrombosis  Behcet's  Multiple mental health diagnoses  Pelvic floor dysfunction   Chronic constipation/IBS  COLLEEN          Discharge Diagnosis:   Normal spontaneous vaginal delivery  Acute on chronic COLLEEN  Intrauterine pregnancy at 36 weeks gestation         Procedures:   Procedure(s): Repair of second degree perineal laceration  Repair of periurethral lacerations    Epidural   Serial laboratory studies   IV iron infusion   Magnesium sulfate for seizure PPx              Medications Prior to Admission:     Facility-Administered Medications Prior to Admission   Medication Dose Route Frequency Provider Last Rate Last Admin     triamcinolone (KENALOG-40) injection 40 mg  40 mg   Andres Johnson, DPM   40 mg at 10/11/21 0928     triamcinolone (KENALOG-40) injection 40 mg  40 mg   Andres Johnson, DPM   40 mg at 06/15/21 09     triamcinolone (KENALOG-40) injection 40 mg  40 mg   Andres Johnson, DPM   40 mg at 09/15/20 1554     [DISCONTINUED] botulinum toxin type A (BOTOX) 100 units injection 200 Units  200 Units Intramuscular Q90 Days Alejandrina Hernandez MD   150 Units at 22 1247     [DISCONTINUED] botulinum toxin type A (BOTOX) 100 units injection 200 Units  200 Units Intramuscular Q90 Days Alejandrina Hernandez MD   200 Units at 22 1524     [DISCONTINUED] lidocaine 1 % injection 0.5 mL  0.5 mL   Andres Johnson DPM   0.5 mL at 10/11/21 0928     [DISCONTINUED] lidocaine 1 % injection 0.5 mL  0.5 mL   Andres Johnson DPM   0.5 mL at 06/15/21 0957     [DISCONTINUED] lidocaine 1 % injection 0.5 mL  0.5 mL    Andres Johnson DPM   0.5 mL at 09/15/20 1554     [DISCONTINUED] methylPREDNISolone (DEPO-MEDROL) injection 40 mg  40 mg   Yeo, Albert, MD   40 mg at 09/29/20 1154     Medications Prior to Admission   Medication Sig Dispense Refill Last Dose     acetaminophen (TYLENOL) 325 MG tablet Take 650 mg by mouth every 6 hours as needed for mild pain   2/10/2023     albuterol (PROAIR HFA/PROVENTIL HFA/VENTOLIN HFA) 108 (90 Base) MCG/ACT inhaler Inhale 2 puffs into the lungs every 6 hours as needed for shortness of breath / dyspnea or wheezing 1 Inhaler 1 Past Week     amitriptyline (ELAVIL) 25 MG tablet TAKE 1 TABLET BY MOUTH EVERY NIGHT AT BEDTIME 90 tablet 1 2/10/2023     artificial tears OINT ophthalmic ointment 0.5 inch strip each eye at night 1 Tube 11 Past Week     benzocaine (TOPICALE XTRA) 20 % GEL Apply as needed locally to mouth or nasal ulcers for pain; 4 times daily as needed 30 g 1 Past Week     betamethasone valerate (VALISONE) 0.1 % cream Apply topically 2 times daily as needed    2/10/2023     bisacodyl (DULCOLAX) 5 MG EC tablet Take 2 tablets (10 mg) by mouth daily as needed for constipation 30 tablet 0 Past Week     citalopram (CELEXA) 20 MG tablet Take 1 tablet (20 mg) by mouth daily 90 tablet 1 2/10/2023     enoxaparin ANTICOAGULANT (LOVENOX) 40 MG/0.4ML syringe    Past Week     EPINEPHrine (EPIPEN 2-EAMON) 0.3 MG/0.3ML injection Inject 0.3 mLs (0.3 mg) into the muscle as needed for anaphylaxis 0.6 mL 3 More than a month     fluocinonide (LIDEX) 0.05 % external gel Apply topically 2 times daily 60 g 3 2/10/2023     hypromellose (GENTEAL) 0.3 % SOLN 1 drop every hour as needed for dry eyes    Past Month     lidocaine (LMX4) 4 % external cream Apply topically once as needed for mild pain 120 g 1 Past Month     LINZESS 290 MCG capsule TAKE 1 CAPSULE BY MOUTH EVERY DAY IN THE MORNING BEFORE BREAKFAST 90 capsule 0 2/10/2023     mometasone (ELOCON) 0.1 % external ointment Apply topically 2 times daily 45 g 3  Past Week     polyethylene glycol (MIRALAX/GLYCOLAX) powder Take 1 capful by mouth 3 times daily   Past Week     Prenatal MV-Min-Fe Fum-FA-DHA (PRENATAL 1 PO)    2/10/2023     sucralfate (CARAFATE) 1 GM/10ML suspension Take 10 mLs (1 g) by mouth 4 times daily 1200 mL 2 More than a month     lactulose 20 GM/30ML SOLN Take 30 mLs by mouth 3 times daily as needed (for constipation) 300 mL 3      order for DME Equipment being ordered: Trilok brace 8 1/2 left lower extremity 1 Device 0      order for DME Equipment being ordered: size 8 1/2 walking boot tall 1 Device 0      sodium fluoride 1.1 % CREA Apply 1 Application topically daily        triamcinolone (KENALOG) 0.1 % external ointment Apply twice daily as needed to lesions on the genitals and body. OK to use very sparingly on the face. 454 g 2      [DISCONTINUED] apremilast (OTEZLA) 30 MG tablet Take 1 tablet (30 mg) by mouth 2 times daily HOLD FOR SIGNS OF INFECTION, AND SEEK MEDICAL ATTENTION. 180 tablet 1 2/10/2023     [DISCONTINUED] aspirin (ASA) 81 MG chewable tablet    2/10/2023     [DISCONTINUED] diclofenac (VOLTAREN) 75 MG EC tablet Take 1 tablet (75 mg) by mouth 2 times daily as needed for moderate pain 60 tablet 1 Past Month     [DISCONTINUED] dicyclomine (BENTYL) 20 MG tablet Take 1 tablet (20 mg) by mouth 4 times daily as needed 120 tablet 3 Past Month     [DISCONTINUED] diphenhydrAMINE (BENADRYL) 25 MG tablet Take 1 tablet (25 mg) by mouth 3 times daily as needed (itching) 40 tablet 1 Past Week     [DISCONTINUED] furosemide (LASIX) 40 MG tablet TAKE ONE TABLET BY MOUTH TWICE DAILY  60 tablet 0 More than a month     [DISCONTINUED] gabapentin (NEURONTIN) 300 MG capsule TAKE ONE CAPSULE BY MOUTH THREE TIMES DAILY  90 capsule 0 2/10/2023     [DISCONTINUED] hydrOXYzine (ATARAX) 25 MG tablet TAKE ONE OR TWO TABLETS BY MOUTH EVERY SIX HOURS AS NEEDED   2/10/2023     [DISCONTINUED] LORazepam (ATIVAN) 0.5 MG tablet TAKE 1 TABLET BY MOUTH EVERY 6 HOURS AS NEEDED  FOR ANXIETY 10 tablet 0 Past Week     [DISCONTINUED] NIFEdipine ER OSMOTIC (PROCARDIA XL) 30 MG 24 hr tablet Take 30 mg by mouth daily   2/10/2023     [DISCONTINUED] ondansetron (ZOFRAN-ODT) 8 MG ODT tab Take 1 tablet (8 mg) by mouth every 8 hours as needed for nausea 180 tablet 1 2/10/2023     [DISCONTINUED] riboflavin 100 MG CAPS Take 2 capsules by mouth daily   Unknown     [DISCONTINUED] rizatriptan (MAXALT) 5 MG tablet Take 1 tablet (5 mg) by mouth at onset of headache for migraine May repeat in 2 hours. Max 6 tablets/24 hours. 10 tablet 1 Past Week     [DISCONTINUED] triamcinolone (KENALOG) 0.1 % paste Take by mouth 2 times daily (Patient not taking: Reported on 12/6/2022) 5 g 5      [DISCONTINUED] triamcinolone (KENALOG) 0.5 % external ointment Apply 1 g topically 3 times daily as needed for irritation (Patient not taking: Reported on 1/25/2023) 15 g 3              Discharge Medications:        Review of your medicines      START taking      Dose / Directions   benzocaine 20 % external aerosol  Commonly known as: AMERICAINE      Apply to perineum four times daily as needed for pain  Quantity: 57 g  Refills: 3     docusate sodium 100 MG capsule  Commonly known as: COLACE      Dose: 100 mg  Take 1 capsule (100 mg) by mouth daily  Quantity: 30 capsule  Refills: 0     hydrocortisone (Perianal) 2.5 % cream  Commonly known as: ANUSOL-HC      Place rectally 3 times daily as needed for hemorrhoids  Quantity: 30 g  Refills: 0     ibuprofen 800 MG tablet  Commonly known as: ADVIL/MOTRIN      Dose: 800 mg  Take 1 tablet (800 mg) by mouth every 6 hours as needed for other (cramping)  Quantity: 40 tablet  Refills: 1     lanolin ointment      Apply topically every hour as needed for other (sore nipples)  Quantity: 7 g  Refills: 3        CONTINUE these medicines which may have CHANGED, or have new prescriptions. If we are uncertain of the size of tablets/capsules you have at home, strength may be listed as something that  might have changed.      Dose / Directions   * enoxaparin ANTICOAGULANT 40 MG/0.4ML syringe  Commonly known as: LOVENOX  This may have changed: Another medication with the same name was added. Make sure you understand how and when to take each.      Refills: 0     * enoxaparin ANTICOAGULANT 40 MG/0.4ML syringe  Commonly known as: LOVENOX  This may have changed: You were already taking a medication with the same name, and this prescription was added. Make sure you understand how and when to take each.      Dose: 40 mg  Inject 0.4 mLs (40 mg) Subcutaneous every 24 hours  Quantity: 16 mL  Refills: 1     gabapentin 300 MG capsule  Commonly known as: NEURONTIN  This may have changed: when to take this  Used for:  (spontaneous vaginal delivery)      Dose: 300 mg  Take 1 capsule (300 mg) by mouth every morning  Quantity: 60 capsule  Refills: 1     * NIFEdipine ER OSMOTIC 30 MG 24 hr tablet  Commonly known as: PROCARDIA XL  This may have changed: how much to take      Dose: 60 mg  Take 2 tablets (60 mg) by mouth daily  Quantity: 60 tablet  Refills: 1     * NIFEdipine ER 60 MG 24 hr tablet  Commonly known as: ADALAT CC  This may have changed: You were already taking a medication with the same name, and this prescription was added. Make sure you understand how and when to take each.  Used for:  (spontaneous vaginal delivery)      Dose: 60 mg  Take 1 tablet (60 mg) by mouth every 12 hours  Quantity: 90 tablet  Refills: 1     triamcinolone 0.1 % external ointment  Commonly known as: KENALOG  This may have changed: Another medication with the same name was removed. Continue taking this medication, and follow the directions you see here.  Used for: Behcet's disease (H)      Apply twice daily as needed to lesions on the genitals and body. OK to use very sparingly on the face.  Quantity: 454 g  Refills: 2         * This list has 4 medication(s) that are the same as other medications prescribed for you. Read the directions  carefully, and ask your doctor or other care provider to review them with you.            CONTINUE these medicines which have NOT CHANGED      Dose / Directions   acetaminophen 325 MG tablet  Commonly known as: TYLENOL      Dose: 650 mg  Take 650 mg by mouth every 6 hours as needed for mild pain  Refills: 0     albuterol 108 (90 Base) MCG/ACT inhaler  Commonly known as: PROAIR HFA/PROVENTIL HFA/VENTOLIN HFA  Used for: Atypical chest pain      Dose: 2 puff  Inhale 2 puffs into the lungs every 6 hours as needed for shortness of breath / dyspnea or wheezing  Quantity: 1 Inhaler  Refills: 1     amitriptyline 25 MG tablet  Commonly known as: ELAVIL  Used for: Pruritus, Fibromyalgia      TAKE 1 TABLET BY MOUTH EVERY NIGHT AT BEDTIME  Quantity: 90 tablet  Refills: 1     artificial tears Oint ophthalmic ointment  Used for: Bilateral dry eyes      0.5 inch strip each eye at night  Quantity: 1 Tube  Refills: 11     benzocaine 20 % Gel  Commonly known as: TOPICALE XTRA  Used for: Behcet's disease (H)      Apply as needed locally to mouth or nasal ulcers for pain; 4 times daily as needed  Quantity: 30 g  Refills: 1     betamethasone valerate 0.1 % external cream  Commonly known as: VALISONE      Apply topically 2 times daily as needed  Refills: 0     bisacodyl 5 MG EC tablet  Commonly known as: DULCOLAX  Used for: Acute abdominal pain      Dose: 10 mg  Take 2 tablets (10 mg) by mouth daily as needed for constipation  Quantity: 30 tablet  Refills: 0     citalopram 20 MG tablet  Commonly known as: celeXA  Used for: TAHIR (generalized anxiety disorder)      Dose: 20 mg  Take 1 tablet (20 mg) by mouth daily  Quantity: 90 tablet  Refills: 1     EPINEPHrine 0.3 MG/0.3ML injection 2-pack  Commonly known as: EpiPen 2-[place]  Used for: Hx of bee sting allergy      Dose: 0.3 mg  Inject 0.3 mLs (0.3 mg) into the muscle as needed for anaphylaxis  Quantity: 0.6 mL  Refills: 3     fluocinonide 0.05 % external gel  Commonly known as: LIDEX  Used  for: Behcet's syndrome (H)      Apply topically 2 times daily  Quantity: 60 g  Refills: 3     hypromellose 0.3 % Soln ophthalmic solution  Commonly known as: GENTEAL      Dose: 1 drop  1 drop every hour as needed for dry eyes  Refills: 0     lactulose 20 GM/30ML solution  Used for: Constipation, unspecified constipation type      Dose: 30 mL  Take 30 mLs by mouth 3 times daily as needed (for constipation)  Quantity: 300 mL  Refills: 3     lidocaine 4 % external cream  Commonly known as: LMX4  Used for: Anal lesion      Apply topically once as needed for mild pain  Quantity: 120 g  Refills: 1     Linzess 290 MCG capsule  Used for: Chronic idiopathic constipation  Generic drug: linaclotide      TAKE 1 CAPSULE BY MOUTH EVERY DAY IN THE MORNING BEFORE BREAKFAST  Quantity: 90 capsule  Refills: 0     mometasone 0.1 % external ointment  Commonly known as: ELOCON  Used for: Behcet's syndrome (H)      Apply topically 2 times daily  Quantity: 45 g  Refills: 3     order for DME  Used for: S/P ankle ligament repair      Equipment being ordered: size 8 1/2 walking boot tall  Quantity: 1 Device  Refills: 0     order for DME  Used for: S/P foot surgery, left, S/P ankle ligament repair      Equipment being ordered: Trilok brace 8 1/2 left lower extremity  Quantity: 1 Device  Refills: 0     polyethylene glycol 17 GM/Dose powder  Commonly known as: MIRALAX      Dose: 1 capful  Take 1 capful by mouth 3 times daily  Refills: 0     PRENATAL 1 PO      Refills: 0     sodium fluoride 1.1 % Crea      Dose: 1 Application  Apply 1 Application topically daily  Refills: 0     sucralfate 1 GM/10ML suspension  Commonly known as: Carafate  Used for: Bile reflux gastritis, Nausea      Dose: 1 g  Take 10 mLs (1 g) by mouth 4 times daily  Quantity: 1200 mL  Refills: 2        STOP taking    aspirin 81 MG chewable tablet  Commonly known as: ASA        diclofenac 75 MG EC tablet  Commonly known as: VOLTAREN        dicyclomine 20 MG tablet  Commonly  known as: Bentyl        diphenhydrAMINE 25 MG tablet  Commonly known as: BENADRYL        furosemide 40 MG tablet  Commonly known as: LASIX        hydrOXYzine 25 MG tablet  Commonly known as: ATARAX        LORazepam 0.5 MG tablet  Commonly known as: ATIVAN        ondansetron 8 MG ODT tab  Commonly known as: ZOFRAN ODT        Otezla 30 MG tablet  Generic drug: apremilast        riboflavin 100 MG Caps        rizatriptan 5 MG tablet  Commonly known as: MAXALT        triamcinolone 0.1 % paste  Commonly known as: KENALOG              Where to get your medicines      These medications were sent to [place], [place] - [address] [facility]  [address] [facility] [address]    Phone: [phone]     NIFEdipine ER 60 MG 24 hr tablet     Some of these will need a paper prescription and others can be bought over the counter. Ask your nurse if you have questions.    Bring a paper prescription for each of these medications    benzocaine 20 % external aerosol    docusate sodium 100 MG capsule    enoxaparin ANTICOAGULANT 40 MG/0.4ML syringe    gabapentin 300 MG capsule    hydrocortisone (Perianal) 2.5 % cream    ibuprofen 800 MG tablet    lanolin ointment    NIFEdipine ER OSMOTIC 30 MG 24 hr tablet                Consultations:   Consultation during this admission received from social work and lactation           Brief History of Labor:   Samara Oropeza is a 27 year old  who was admitted at 1600 on 2/10/23. She presented from clinic due to recurrent mild range BPs, and was found to be preeclamptic. She was noted to be 0 cm dilated. This pregnancy was complicated by chronic hypertension, superimposed preeclampsia, IUGR, multiple mental health medications/diagnoses, hx chronic superficial thrombophlebitis, migraines, Behcet's, IBS-C. GBS negative, Rh positive. She was admitted to Labor and Delivery. Betamethasone was given 2/10-. Cervical ripening was performed with oral  cytotec. Labor progressed, and epidural was administered. Pitocin augmentation was begun, as contractions were not adequate. IUPC placed. Rupture of membranes was spontaneous, and clear fluid was noted. She progressed to complete. She pushed effectively, for approximately 13 minutes. At 0251, she experienced  of a vigorous 2100g female , from a direct OA position, over a right periurethral and a second degree perineal laceration. Repair was performed with 3.0 vicryl.  APGARS 7/9. Pitocin was begun after delivery of the baby. The cord was cut at 120+ seconds, as it had ceased pulsing. A three vessel cord was noted. The placenta delivered spontaneously, and found to appear intact on inspection. Mild uterine atony noted. Hemabate x1 given, along with rectal cytotec. QBL 277mL. Postpartum, magnesium sulfate was started, per normal protocol.           Hospital Course:   The patient's hospital course was notable for her superimposed pre-E with SF for which she received 24 hours of PPx magnesium sulfate which she tolerated well.  In addition, her PTA dose of Procardia XL was titrated from 30 to 120 mg daily (60 mg BID) and she was also given a dose of PO lasix x2. On discharge, her BP were normotensive with rare mild range.     The patient also had acute on chronic blood loss with known history of chronic COLLEEN.  She received one dose of Venofer prenatally and a second dose on PPD#2.      On discharge, her pain was well controlled. Vaginal bleeding is similar to peak menstrual flow.  Voiding without difficulty.  Ambulating well and tolerating a normal diet.  No fever.  Breastfeedin well.  Infant is stable.  Small BM prior to discharge.  She was discharged on post-partum day #3 with  Home health referral as well as instructions for weekly visits for 3 weeks.  After hour nurse line number again provided.     Post-partum hemoglobin:   Hemoglobin   Date Value Ref Range Status   2023 7.8 (L) 11.7 - 15.7 g/dL Final    09/07/2020 13.0 11.7 - 15.7 g/dL Final             Discharge Instructions and Follow-Up:   Discharge diet: Regular   Discharge activity: No driving or operating machinery while on narcotic analgesics  Pelvic rest: abstain from intercourse and do not use tampons for 6 week(s)   Discharge follow-up: Follow up with primary care provider in 1 weeks for mood and blood pressure check as well as in 6 weeks  Home health referral was also placed.    Wound care: Drink plenty of fluids  Ice to area for comfort           Discharge Disposition:   Discharged to home      Mikaela Cisneros MD   Pager: [phone]   February 15, 2023

## 2023-02-12 NOTE — PLAN OF CARE
Data: Samara Oropeza transferred to Room 443 via wheelchair at 0710. Baby transferred via parent's arms.  Action: Receiving unit notified of transfer: Yes. Patient and family notified of room change. Report given to JESSY Hutson at 0730. Belongings sent to receiving unit. Accompanied by Registered Nurse. Oriented patient to surroundings. Call light within reach. ID bands double-checked with receiving RN.  Response: Patient tolerated transfer and is stable.    .Patients mobililty level scored using the bedside mobility assistance tool (BMAT). Patient is at a mobility level test number: 2. Mobility equipment used: wheelchair. Required assist of 1 staff members. Further use of BMAT scoring required.

## 2023-02-12 NOTE — PLAN OF CARE
Pt received a replacement for epidural 2/11/23 at 2330. Epidural catheter was removed by RN at 0700 tip intact.

## 2023-02-13 LAB
ALT SERPL W P-5'-P-CCNC: 18 U/L (ref 10–35)
AST SERPL W P-5'-P-CCNC: 32 U/L (ref 10–35)
BASOPHILS # BLD AUTO: 0 10E3/UL (ref 0–0.2)
BASOPHILS NFR BLD AUTO: 0 %
CREAT SERPL-MCNC: 0.67 MG/DL (ref 0.51–0.95)
EOSINOPHIL # BLD AUTO: 0 10E3/UL (ref 0–0.7)
EOSINOPHIL NFR BLD AUTO: 0 %
ERYTHROCYTE [DISTWIDTH] IN BLOOD BY AUTOMATED COUNT: 14.8 % (ref 10–15)
GFR SERPL CREATININE-BSD FRML MDRD: >90 ML/MIN/1.73M2
HCT VFR BLD AUTO: 24.2 % (ref 35–47)
HGB BLD-MCNC: 7.3 G/DL (ref 11.7–15.7)
IMM GRANULOCYTES # BLD: 0.1 10E3/UL
IMM GRANULOCYTES NFR BLD: 1 %
LYMPHOCYTES # BLD AUTO: 1.9 10E3/UL (ref 0.8–5.3)
LYMPHOCYTES NFR BLD AUTO: 17 %
MCH RBC QN AUTO: 26.6 PG (ref 26.5–33)
MCHC RBC AUTO-ENTMCNC: 30.2 G/DL (ref 31.5–36.5)
MCV RBC AUTO: 88 FL (ref 78–100)
MONOCYTES # BLD AUTO: 0.7 10E3/UL (ref 0–1.3)
MONOCYTES NFR BLD AUTO: 6 %
NEUTROPHILS # BLD AUTO: 8.3 10E3/UL (ref 1.6–8.3)
NEUTROPHILS NFR BLD AUTO: 76 %
NRBC # BLD AUTO: 0.1 10E3/UL
NRBC BLD AUTO-RTO: 1 /100
PLATELET # BLD AUTO: 167 10E3/UL (ref 150–450)
RBC # BLD AUTO: 2.74 10E6/UL (ref 3.8–5.2)
WBC # BLD AUTO: 11 10E3/UL (ref 4–11)

## 2023-02-13 PROCEDURE — 84450 TRANSFERASE (AST) (SGOT): CPT | Performed by: OBSTETRICS & GYNECOLOGY

## 2023-02-13 PROCEDURE — 120N000001 HC R&B MED SURG/OB

## 2023-02-13 PROCEDURE — 250N000013 HC RX MED GY IP 250 OP 250 PS 637: Performed by: OBSTETRICS & GYNECOLOGY

## 2023-02-13 PROCEDURE — 250N000011 HC RX IP 250 OP 636: Performed by: OBSTETRICS & GYNECOLOGY

## 2023-02-13 PROCEDURE — 258N000003 HC RX IP 258 OP 636: Performed by: OBSTETRICS & GYNECOLOGY

## 2023-02-13 PROCEDURE — 84460 ALANINE AMINO (ALT) (SGPT): CPT | Performed by: OBSTETRICS & GYNECOLOGY

## 2023-02-13 PROCEDURE — 82565 ASSAY OF CREATININE: CPT | Performed by: OBSTETRICS & GYNECOLOGY

## 2023-02-13 PROCEDURE — 85025 COMPLETE CBC W/AUTO DIFF WBC: CPT | Performed by: OBSTETRICS & GYNECOLOGY

## 2023-02-13 PROCEDURE — 36415 COLL VENOUS BLD VENIPUNCTURE: CPT | Performed by: OBSTETRICS & GYNECOLOGY

## 2023-02-13 RX ORDER — METHYLPREDNISOLONE SODIUM SUCCINATE 125 MG/2ML
125 INJECTION, POWDER, LYOPHILIZED, FOR SOLUTION INTRAMUSCULAR; INTRAVENOUS
Status: DISCONTINUED | OUTPATIENT
Start: 2023-02-13 | End: 2023-02-16 | Stop reason: HOSPADM

## 2023-02-13 RX ORDER — DIPHENHYDRAMINE HYDROCHLORIDE 50 MG/ML
50 INJECTION INTRAMUSCULAR; INTRAVENOUS
Status: DISCONTINUED | OUTPATIENT
Start: 2023-02-13 | End: 2023-02-16 | Stop reason: HOSPADM

## 2023-02-13 RX ORDER — NIFEDIPINE 30 MG/1
30 TABLET, EXTENDED RELEASE ORAL ONCE
Status: COMPLETED | OUTPATIENT
Start: 2023-02-13 | End: 2023-02-13

## 2023-02-13 RX ORDER — NIFEDIPINE 30 MG/1
90 TABLET, EXTENDED RELEASE ORAL DAILY
Status: DISCONTINUED | OUTPATIENT
Start: 2023-02-14 | End: 2023-02-15

## 2023-02-13 RX ADMIN — CITALOPRAM HYDROBROMIDE 20 MG: 20 TABLET ORAL at 09:15

## 2023-02-13 RX ADMIN — MAGNESIUM SULFATE HEPTAHYDRATE 2 G/HR: 40 INJECTION, SOLUTION INTRAVENOUS at 01:03

## 2023-02-13 RX ADMIN — IBUPROFEN 800 MG: 800 TABLET, FILM COATED ORAL at 04:30

## 2023-02-13 RX ADMIN — GABAPENTIN 300 MG: 300 CAPSULE ORAL at 09:15

## 2023-02-13 RX ADMIN — ONDANSETRON 8 MG: 4 TABLET, ORALLY DISINTEGRATING ORAL at 20:58

## 2023-02-13 RX ADMIN — ACETAMINOPHEN 650 MG: 325 TABLET ORAL at 07:35

## 2023-02-13 RX ADMIN — AMITRIPTYLINE HYDROCHLORIDE 25 MG: 25 TABLET, FILM COATED ORAL at 20:58

## 2023-02-13 RX ADMIN — ENOXAPARIN SODIUM 40 MG: 40 INJECTION SUBCUTANEOUS at 20:57

## 2023-02-13 RX ADMIN — ACETAMINOPHEN 650 MG: 325 TABLET ORAL at 20:58

## 2023-02-13 RX ADMIN — DOCUSATE SODIUM 100 MG: 100 CAPSULE, LIQUID FILLED ORAL at 09:15

## 2023-02-13 RX ADMIN — IRON SUCROSE 300 MG: 20 INJECTION, SOLUTION INTRAVENOUS at 10:05

## 2023-02-13 RX ADMIN — ACETAMINOPHEN 650 MG: 325 TABLET ORAL at 01:14

## 2023-02-13 RX ADMIN — NIFEDIPINE 30 MG: 30 TABLET, FILM COATED, EXTENDED RELEASE ORAL at 13:53

## 2023-02-13 RX ADMIN — ACETAMINOPHEN 650 MG: 325 TABLET ORAL at 13:32

## 2023-02-13 RX ADMIN — NIFEDIPINE 60 MG: 30 TABLET, FILM COATED, EXTENDED RELEASE ORAL at 09:15

## 2023-02-13 RX ADMIN — IBUPROFEN 800 MG: 800 TABLET, FILM COATED ORAL at 10:19

## 2023-02-13 RX ADMIN — ONDANSETRON 8 MG: 2 INJECTION INTRAMUSCULAR; INTRAVENOUS at 09:20

## 2023-02-13 RX ADMIN — IBUPROFEN 800 MG: 800 TABLET, FILM COATED ORAL at 17:07

## 2023-02-13 ASSESSMENT — ACTIVITIES OF DAILY LIVING (ADL)
ADLS_ACUITY_SCORE: 22
ADLS_ACUITY_SCORE: 20
ADLS_ACUITY_SCORE: 22
ADLS_ACUITY_SCORE: 20

## 2023-02-13 NOTE — CARE PLAN
Pt requested  Hydroxyzine (Atarax) for bedtime. Pt has been taking this regularly 2 x daily   ( Morning and bedtime ) .  MD paged and got a telephone order to give to pt.   Educated pt about Atarax if breastfeeding .

## 2023-02-13 NOTE — PLAN OF CARE
VSS. /88 and 125/95. Stand-by assist. Voiding without difficulty. Lochia scant, no clots. Fundus firm and midline. Magnesium sulfate stopped at 0430 per MD orders. On and off headache that is relieved by meds. No upper right epigastric pain or visual changes. Reflexes 3+. Zero beats of clonus. Pain well managed with Ibuprofen and Tylenol. Breastfeeding, pumping and supplementing with donor milk, tolerating well. FOB at bedside. Bonding well with infant.

## 2023-02-13 NOTE — PROGRESS NOTES
Public Health Nurse (PHN) spoke with patient regarding [facility] services and resources.  Patient was provided the [facility] Community Resource Guide and Public Health rack cards. Patient accepted referral for home visiting services.  [place] Warning given, referral completed electronically.  Patient reports no questions or concerns at this time.

## 2023-02-13 NOTE — PROVIDER NOTIFICATION
02/13/23 1348   Provider Notification   Provider Name/Title Dr. Martin   Method of Notification Phone   Request Evaluate-Remote   Notification Reason Other     BP's slightly elevated, 134/94. Provider order: changing dose of BP med. Continue to monitor Q2 VS while awake.

## 2023-02-13 NOTE — PROVIDER NOTIFICATION
02/13/23 0748   Provider Notification   Provider Name/Title Dr. Martin   Method of Notification Electronic Page   Request Evaluate-Remote   Notification Reason Other   FYI pt's /93. Provider order: BP every 2 hours when awake and Q4 when sleeping.

## 2023-02-13 NOTE — PROVIDER NOTIFICATION
02/12/23 1905   Provider Notification   Provider Name/Title Dr. Hancock   Method of Notification Phone   Notification Reason Status Update;Other     MD updated with pts recent /80's.  VS every 4 hours now .  Discontinue Mag Sulfate at 24 hours  and  ALT, AST , CBC , Creatinine in AM per telephone order.

## 2023-02-13 NOTE — PROGRESS NOTES
[facility] OB Postpartum Note    S:  Samara Oropeza feels a little better this morning. Was able to sleep last night. Pain control adequate. Lochia minimal. Voiding. Breast feeding. Mood Good.     O:  Vitals were reviewed  BP: (!) 126/93   Systolic (24hrs), Av , Min:125 , Max:143   Diastolic (24hrs), Av, Min:87, Max:95      Intake/Output Summary (Last 24 hours) at 2023 0910  Last data filed at 2023 0824  Gross per 24 hour   Intake 4809 ml   Output 3910 ml   Net 899 ml       General: healthy, alert and no distress  Abd: soft, appropriately tender, fundus firm  Legs: Non-tender, 1+ pitting edema    No results found for: RH       Assessment and Plan:   Postpartum Day #1, status post vaginal delivery, doing well. Admitted with SIUP 36w5, admitted for cervical ripening/IOL due to chtn with superimposed preE without SF     CHTN with superimposed preE w/o SF  - HELLP labs - pr/cr ratio 1.68, rest WNL              - platelets noted trending down              - repeat labs in AM  - currently BP's normal to occasional mild range  - procardia to 60 mg XL daily  - magnesium started at 0400, postpartum and discharge on AM  at 0400  -bmtz x2   -discussed with patient that we will check BP Q2 hours while awake and Q4 hours while asleep to help adjust her medications with goal <140/90.  Discussed the need to stay additional 1-2 days depending on BP.  Also reviewed the need for in person BP check within 1 week after discharge.      Fetal well being  - BMZ given 2/10- 1630 hrs  - rooming-in, not in NICU  -SGA        Chronic LUE thrombosis  - SCDs ordered while in bed  - orders to resume Lovenox for 6 weeks postpartum (home rx written)     TAHIR  - SW PP  -no meds  -mood check with BP check at 1 week    Bhecets  -on otezal     Hx migraines  -follows with neuro and does botox injections     COLLEEN with ABLA  -hgb 9.4> IV iorn X1 before delivery on 2/10> >  (atony with pit, Hemabate, cytotec) > hgb 7.3  > IV iron today > hgb in AM  -cannot tolerate oral iron due to IBS and GI issues       Suzy Martin, DO, DO

## 2023-02-13 NOTE — PLAN OF CARE
Data: Blood pressures slightly elevated today, diastolics in the 90s. Provider aware, order for Q2 vitals while awake and Q4 while sleeping. Complaining of headache, but no other pre-E symptoms. Mag was stopped this morning at 4:30am. Other vital signs within normal limits. Postpartum checks within normal limits - see flow record. Patient eating and drinking normally. Patient able to empty bladder independently and is up ambulating. Patient performing self cares, is able to care for infant and is breastfeeding every 2-3 hours. Pt pumping and supplementing with DM intermittently. Using ice, heating pad and aamir bottle for discomfort. Hgb was 7.3 today, given IV iron.  Action: Patient medicated with ibuprofen and tylenol during the shift for pain. See MAR. Adequate pain control noted by patient. Patient education done, see flow record.  Response: Positive attachment behaviors observed with infant. Patient's spouse and family present this shift.   Plan: Continue current plan of care. Discharge possibly on 2/14/23 depending om BP control. Pt encouraged to call with questions/concerns.

## 2023-02-13 NOTE — CARE PLAN
Pt took Otezla 30 mg p.o with dinner and takes it with Zofran  ODT.  Gabapentin not given this evening  because pt only takes in AM.

## 2023-02-13 NOTE — PROVIDER NOTIFICATION
02/12/23 7004   Provider Notification   Provider Name/Title Dr. Hancock   Method of Notification Phone   Notification Reason Other     Dr. Hancock called  PP and ok to  Discontinue magnesium sulfate at 0430 AM . Aware that pt had RUQ pain earlier but is better now.

## 2023-02-14 LAB
ALT SERPL W P-5'-P-CCNC: 22 U/L (ref 10–35)
AST SERPL W P-5'-P-CCNC: 35 U/L (ref 10–35)
CREAT SERPL-MCNC: 0.62 MG/DL (ref 0.51–0.95)
ERYTHROCYTE [DISTWIDTH] IN BLOOD BY AUTOMATED COUNT: 15 % (ref 10–15)
GFR SERPL CREATININE-BSD FRML MDRD: >90 ML/MIN/1.73M2
HCT VFR BLD AUTO: 25.6 % (ref 35–47)
HGB BLD-MCNC: 7.8 G/DL (ref 11.7–15.7)
MCH RBC QN AUTO: 27.5 PG (ref 26.5–33)
MCHC RBC AUTO-ENTMCNC: 30.5 G/DL (ref 31.5–36.5)
MCV RBC AUTO: 90 FL (ref 78–100)
PATH REPORT.COMMENTS IMP SPEC: NORMAL
PATH REPORT.COMMENTS IMP SPEC: NORMAL
PATH REPORT.FINAL DX SPEC: NORMAL
PATH REPORT.GROSS SPEC: NORMAL
PATH REPORT.MICROSCOPIC SPEC OTHER STN: NORMAL
PATH REPORT.RELEVANT HX SPEC: NORMAL
PHOTO IMAGE: NORMAL
PLATELET # BLD AUTO: 171 10E3/UL (ref 150–450)
RBC # BLD AUTO: 2.84 10E6/UL (ref 3.8–5.2)
WBC # BLD AUTO: 10.5 10E3/UL (ref 4–11)

## 2023-02-14 PROCEDURE — 85027 COMPLETE CBC AUTOMATED: CPT | Performed by: OBSTETRICS & GYNECOLOGY

## 2023-02-14 PROCEDURE — 250N000011 HC RX IP 250 OP 636: Performed by: OBSTETRICS & GYNECOLOGY

## 2023-02-14 PROCEDURE — 250N000013 HC RX MED GY IP 250 OP 250 PS 637: Performed by: OBSTETRICS & GYNECOLOGY

## 2023-02-14 PROCEDURE — 36415 COLL VENOUS BLD VENIPUNCTURE: CPT | Performed by: OBSTETRICS & GYNECOLOGY

## 2023-02-14 PROCEDURE — 84460 ALANINE AMINO (ALT) (SGPT): CPT | Performed by: OBSTETRICS & GYNECOLOGY

## 2023-02-14 PROCEDURE — 120N000001 HC R&B MED SURG/OB

## 2023-02-14 PROCEDURE — 84450 TRANSFERASE (AST) (SGOT): CPT | Performed by: OBSTETRICS & GYNECOLOGY

## 2023-02-14 PROCEDURE — 82565 ASSAY OF CREATININE: CPT | Performed by: OBSTETRICS & GYNECOLOGY

## 2023-02-14 RX ORDER — GABAPENTIN 300 MG/1
300 CAPSULE ORAL EVERY MORNING
Qty: 60 CAPSULE | Refills: 1 | Status: SHIPPED | OUTPATIENT
Start: 2023-02-15

## 2023-02-14 RX ORDER — GABAPENTIN 300 MG/1
300 CAPSULE ORAL EVERY MORNING
Status: DISCONTINUED | OUTPATIENT
Start: 2023-02-14 | End: 2023-02-16 | Stop reason: HOSPADM

## 2023-02-14 RX ORDER — NIFEDIPINE 90 MG/1
90 TABLET, FILM COATED, EXTENDED RELEASE ORAL DAILY
Qty: 30 TABLET | Refills: 1 | Status: SHIPPED | OUTPATIENT
Start: 2023-02-15 | End: 2023-02-15

## 2023-02-14 RX ORDER — FUROSEMIDE 40 MG
40 TABLET ORAL ONCE
Status: COMPLETED | OUTPATIENT
Start: 2023-02-14 | End: 2023-02-14

## 2023-02-14 RX ADMIN — NIFEDIPINE 90 MG: 30 TABLET, FILM COATED, EXTENDED RELEASE ORAL at 08:19

## 2023-02-14 RX ADMIN — CITALOPRAM HYDROBROMIDE 20 MG: 20 TABLET ORAL at 09:22

## 2023-02-14 RX ADMIN — FUROSEMIDE 40 MG: 40 TABLET ORAL at 10:00

## 2023-02-14 RX ADMIN — ENOXAPARIN SODIUM 40 MG: 40 INJECTION SUBCUTANEOUS at 20:03

## 2023-02-14 RX ADMIN — AMITRIPTYLINE HYDROCHLORIDE 25 MG: 25 TABLET, FILM COATED ORAL at 22:24

## 2023-02-14 RX ADMIN — ONDANSETRON 8 MG: 4 TABLET, ORALLY DISINTEGRATING ORAL at 19:49

## 2023-02-14 RX ADMIN — IBUPROFEN 800 MG: 800 TABLET, FILM COATED ORAL at 00:43

## 2023-02-14 RX ADMIN — DOCUSATE SODIUM 100 MG: 100 CAPSULE, LIQUID FILLED ORAL at 09:22

## 2023-02-14 RX ADMIN — GABAPENTIN 300 MG: 300 CAPSULE ORAL at 09:22

## 2023-02-14 RX ADMIN — IBUPROFEN 800 MG: 800 TABLET, FILM COATED ORAL at 09:22

## 2023-02-14 RX ADMIN — IBUPROFEN 800 MG: 800 TABLET, FILM COATED ORAL at 23:02

## 2023-02-14 RX ADMIN — IBUPROFEN 800 MG: 800 TABLET, FILM COATED ORAL at 17:00

## 2023-02-14 ASSESSMENT — ACTIVITIES OF DAILY LIVING (ADL)
ADLS_ACUITY_SCORE: 20
ADLS_ACUITY_SCORE: 22
ADLS_ACUITY_SCORE: 20
ADLS_ACUITY_SCORE: 22
ADLS_ACUITY_SCORE: 20
ADLS_ACUITY_SCORE: 22

## 2023-02-14 NOTE — LACTATION NOTE
Pt was holding infant STS when writer entered the room. Infant had spit up previously and was resting against mom. Infant was brought to breast with nipple shield.Infant was too sleepy to do anything even with stimulation. After about 10 mins infant was removed from breast area and 15ml of donor milk was given. Paced feeding with frequent burping. Encouraged dad to hold infant upright following feeding. Educated parents on the  LPT infants needs  and what things may help infant have the best outcome. Pt was willing to start pumping after each feeding attempt. Educated pt on importance of frequent stimulation of breast tissue to increase milk supply.Pt and family educated on the use of the pump as well as how clean. Pt was able to pump 5mls. Encourage parents to call for help with feedings at this time.   
This note was copied from a baby's chart.  Lactation visit; this is Samara's first baby and she states infant sometimes sleepy at breast and other times able to latch.  Reviewed LPT infant feeding behaviors and benefits of STS, hand expression, and hands on pumping techniques to maximize milk production.  Discussed importance of every 3 hour breast stimulation to establish/maintain breast supply.  Samara states she has been consistently pumping after breastfeeds and is getting roughly 20-30 ml this afternoon. Infant supplementing with EBM.  All questions answered and outpatient lactation resources provided.  Encouraged to call for lactation support as needed.   
No/Not applicable

## 2023-02-14 NOTE — PROGRESS NOTES
"[facility] Postpartum Note    S:  Samara Oropeza feels well this morning. Was able to sleep last night. Pain control adequate. Lochia minimal. Voiding. Breast feeding. Mood Good.     O:  Vitals were reviewed  Blood pressure (!) 131/95, pulse 60, temperature 98.7  F (37.1  C), temperature source Oral, resp. rate 16, height 1.6 m (5' 3\"), weight 83.2 kg (183 lb 8 oz), SpO2 96 %, unknown if currently breastfeeding.      General: healthy, alert and no distress  Abd: soft, appropriately tender, fundus firm  Legs: Non-tender, 2+ pitting edema    No results found for: RH  Rubella: immune    Assessment and Plan:   Postpartum Day #2, status post vaginal delivery, doing well. Admitted with SIUP 36w5, admitted for cervical ripening/IOL due to chtn with superimposed preE without SF     CHTN with superimposed preE w/o SF  - HELLP labs - pr/cr ratio 1.68, rest WNL              - platelets noted trending down              - repeat labs in AM  - currently BP's normal to occasional mild range  - procardia to 60 mg XL daily  - magnesium postpartum, 2/12-2/13 7519-8910  -bmtz x2   -discussed with patient that we will check BP Q2 hours while awake and Q4 hours while asleep to help adjust her medications with goal <140/90.  Discussed the need to stay additional 1-2 days depending on BP.  Also reviewed the need for in person BP check within 1 week after discharge.   -lasix x1 today     Fetal well being  - BMZ given 2/10-11 1630 hrs  - rooming-in, not in NICU  -SGA     Chronic LUE thrombosis  - SCDs ordered while in bed  - orders to resume Lovenox for 6 weeks postpartum (home rx written)     TAHIR  - SW PP  -no meds  -mood check with BP check at 1 week     Behcets  -on otezla     Hx migraines  -follows with neuro and does botox injections      COLLEEN with ABLA  -hgb 9.4> IV iorn X1 before delivery on 2/10> >  (atony with pit, Hemabate, cytotec) > hgb 7.3 > IV iron 2/13 > hgb ordered  -cannot tolerate oral iron due to IBS and " GI issues      Lauren Frances MD on 2/14/2023 at 8:05 AM

## 2023-02-14 NOTE — PLAN OF CARE
"Patient received new dose of Nifedipine 90 mg this morning 137/97. Dr. Frances rounding and at bedside and updated on B/P. MD ordering a 1x oraldose of Lasix 40 mg, which has been given. Patient is up independent in room, meeting all personal and infant needs. Is both breast and bottle feeding own pumped EBM. Infant tolerating well. Patient is drinking well. Denies preeclamptic sx.. Does state she has a \"slight headache\". Reflexes WNL, and no longer hyperactive. Pain is being managed with PRN Ibuprofen. Using mother love and hydrogel pads for nipple discomfort. Patient will stay overnight for further B/P monitoring and plan for discharge to home tomorrow.   "

## 2023-02-14 NOTE — CONSULTS
INITIAL SOCIAL WORK MATERNITY ASSESSMENT     DATA:      Reason for Social Work Consult: per consult placed for 1st baby, poor health history, & TAHIR     Presenting Information: This is Samara TRIPLETT & JANNA Avi's first baby.     Living Situation: Parents shared they live together in [place] but plan to move to [facility] in a few months to be closer to family.      Social Support/Professional Community Support:  Parents shared they have good family support. Family all live about 25 minutes away but plan to come be with them to help as needed once they are home.     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 Avi works for [employer] in the [employer].     Baby Supplies: They shared they have a car seat, basinet, pack n play, & some diapers. MACIEJ provided a premie size diaper pack & baby bundle. SW also provided information on baby supply resources & discussed diaper bank of [place].      Mental Health History: UZIEL has anxiety & depression. She shared she is currently taking Celexa to help manage her mood. She also shared she had seen a therapist in the past but is not currently seeing one.      History of Postpartum Mood Disorders: Not applicable as this is her first baby. MACIEJ did discuss baby blues & postpartum depression. SW provided information on this & discussed available resources if needed.      Chemical Health History: none noted         INTERVENTION:        MACIEJ completed chart review and collaborated with the multidisciplinary team.     Psychosocial Assessment     Introduction to Maternal Child Health  role and scope of practice     Reviewed Hospital and Community Resources     Assessed Chemical Health History and Current Symptoms     Assessed Mental Health History and Current Symptoms     Identified stressors, barriers and family concerns  "    Provided support and active empathetic listening and validation.     Provided psychoeducation on  mood and anxiety disorders, assessed for any current symptoms or history    ASSESSMENT:      Samara & Avi appeared supportive toward one another & loving towards baby, \"Lashanda.\" They are prepared for baby at home & already enrolled in WIC. SW provided WIC's contact information for them to call to add Lashanda.      PLAN:       No additional SW needs identified at this time. SW will continue to be available & can be re-consulted if needs arise.     ADRIANA Douglass  [facility]  2023  10:57 AM     "

## 2023-02-14 NOTE — PLAN OF CARE
VSS. /88. Up ad anjali. Voiding without difficulty. Pain well managed with Tylenol and Ibuprofen. Headache, denies other preE symptoms. Breastfeeding, pumping and supplementing with donor milk, tolerating well. FOB supportive and at bedside. Bonding well with infant.

## 2023-02-14 NOTE — PROVIDER NOTIFICATION
02/14/23 1130   Provider Notification   Provider Name/Title Dr. Frances   Method of Notification In Department   Request Evaluate-Remote   Notification Reason Lab Results;Status Update     Updated MD on all lab work that came back. No new orders.

## 2023-02-15 VITALS
OXYGEN SATURATION: 96 % | SYSTOLIC BLOOD PRESSURE: 140 MMHG | HEIGHT: 63 IN | HEART RATE: 113 BPM | DIASTOLIC BLOOD PRESSURE: 88 MMHG | BODY MASS INDEX: 30.94 KG/M2 | TEMPERATURE: 98.5 F | RESPIRATION RATE: 16 BRPM | WEIGHT: 174.6 LBS

## 2023-02-15 LAB — PLATELET # BLD AUTO: 172 10E3/UL (ref 150–450)

## 2023-02-15 PROCEDURE — 36415 COLL VENOUS BLD VENIPUNCTURE: CPT | Performed by: OBSTETRICS & GYNECOLOGY

## 2023-02-15 PROCEDURE — 250N000013 HC RX MED GY IP 250 OP 250 PS 637: Performed by: OBSTETRICS & GYNECOLOGY

## 2023-02-15 PROCEDURE — 85049 AUTOMATED PLATELET COUNT: CPT | Performed by: OBSTETRICS & GYNECOLOGY

## 2023-02-15 PROCEDURE — 250N000011 HC RX IP 250 OP 636: Performed by: OBSTETRICS & GYNECOLOGY

## 2023-02-15 RX ORDER — NIFEDIPINE 30 MG/1
60 TABLET, EXTENDED RELEASE ORAL EVERY 12 HOURS
Status: DISCONTINUED | OUTPATIENT
Start: 2023-02-15 | End: 2023-02-16 | Stop reason: HOSPADM

## 2023-02-15 RX ORDER — FUROSEMIDE 20 MG
40 TABLET ORAL ONCE
Status: COMPLETED | OUTPATIENT
Start: 2023-02-15 | End: 2023-02-15

## 2023-02-15 RX ADMIN — NIFEDIPINE 90 MG: 30 TABLET, FILM COATED, EXTENDED RELEASE ORAL at 08:08

## 2023-02-15 RX ADMIN — NIFEDIPINE 60 MG: 30 TABLET, FILM COATED, EXTENDED RELEASE ORAL at 17:00

## 2023-02-15 RX ADMIN — ENOXAPARIN SODIUM 40 MG: 40 INJECTION SUBCUTANEOUS at 18:58

## 2023-02-15 RX ADMIN — GABAPENTIN 300 MG: 300 CAPSULE ORAL at 09:34

## 2023-02-15 RX ADMIN — ONDANSETRON 8 MG: 4 TABLET, ORALLY DISINTEGRATING ORAL at 09:42

## 2023-02-15 RX ADMIN — IBUPROFEN 800 MG: 800 TABLET, FILM COATED ORAL at 09:34

## 2023-02-15 RX ADMIN — ACETAMINOPHEN 650 MG: 325 TABLET ORAL at 03:43

## 2023-02-15 RX ADMIN — DOCUSATE SODIUM 100 MG: 100 CAPSULE, LIQUID FILLED ORAL at 09:35

## 2023-02-15 RX ADMIN — CITALOPRAM HYDROBROMIDE 20 MG: 20 TABLET ORAL at 09:36

## 2023-02-15 RX ADMIN — ACETAMINOPHEN 650 MG: 325 TABLET ORAL at 18:58

## 2023-02-15 RX ADMIN — ACETAMINOPHEN 650 MG: 325 TABLET ORAL at 11:07

## 2023-02-15 RX ADMIN — FUROSEMIDE 40 MG: 20 TABLET ORAL at 09:34

## 2023-02-15 RX ADMIN — IBUPROFEN 800 MG: 800 TABLET, FILM COATED ORAL at 16:17

## 2023-02-15 ASSESSMENT — ACTIVITIES OF DAILY LIVING (ADL)
ADLS_ACUITY_SCORE: 20

## 2023-02-15 NOTE — PROVIDER NOTIFICATION
02/15/23 1647   Provider Notification   Provider Name/Title Dr Bailey   Method of Notification Phone   Notification Reason Status Update   Blood pressures today 135/96, 133/88, 138/99. Order for 60 mg nifedipine now. Changed nifedipine to 60 mg BID. Recheck bps at 1800 and 2000 and update provider at that time.

## 2023-02-15 NOTE — PROVIDER NOTIFICATION
02/15/23 1050   Provider Notification   Provider Name/Title Dr Bailey   Method of Notification In Department     MD on unit and reviewed patients chart. RN to take blood pressures at 11, 1-2pm, and 4-5pm. Page MD with results to decide discharge plan. Pt to follow up weekly in clinic for three weeks. MD placed orders for home health to check BP in 72 hours.

## 2023-02-15 NOTE — PROGRESS NOTES
[facility]   Post-partum Note    Name:  Samara Oropeza  MRN: [id number]    S: Patient states she is doing well overall.  What does she need to do about her stiches?  When does she need to follow-up?  Pain is controlled.  Tolerating regular diet without nausea or vomiting. Voiding spontaneously, passing flatus and has had a small BM.  Chronic constipation is at baseline.  Ambulating without dizziness - feels much better today than yesterday.  Lochia is similar to a menses.  Breast feeding.  Plans discharge today if possible. .    O:   Patient Vitals for the past 24 hrs:   BP Temp Temp src Pulse Resp Weight   02/15/23 0807 129/87 98.7  F (37.1  C) Oral 96 18 --   02/15/23 0700 -- -- -- -- -- 79.2 kg (174 lb 9.6 oz)   02/15/23 0421 (!) 143/96 98.6  F (37  C) Oral -- 16 --   02/15/23 0015 126/83 98.4  F (36.9  C) Oral -- 18 --   23 1954 (!) 136/99 98.6  F (37  C) Oral 112 16 --   23 1730 (!) 135/93 -- -- 106 -- --   23 1640 (!) 140/97 98.7  F (37.1  C) Oral 110 18 --   23 1226 (!) 134/94 -- -- 110 -- --     Gen:  Resting comfortably, NAD  CV:  Regular rate  Pulm:  Non-labored breathing.  No cough or wheezing.   Abd:  Soft, appropriately tender to palpation, non-distended.  Fundus at -2 umbilicus, firm and non-tender.  Ext:  Non-tender, 1+ LE edema b/l    Assessment/Plan:  27 year old  on PPD #3 s/p  following IOL for pre-E with SF.  Continue with routine postpartum management.     Superimposed Pre-E with SF (BPs and P:Cr of 1.68):   - BPs q2 hour while awake prior to discharge this evening   - S/p 24 hours of PP magnesium sulfate   - BP mild range with 90 mg of Procardia XL  - S/p one dose of PO lasix on 2023, repeat again today.  Appropriate weight loss.   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 Getting trigger point injections/botox.    TAHIR:   - HB intake on 8/15, continue to follow closely  - S/p social work PP   - Mood check with BP check within 1 week of discharge    Pelvic floor dysfunction:   - Pelvic floor PT consult on 9/27/2022    Chronic constipation/IBS, pelvic floor dysfunction:   - Miralax TID, Linzess daily   - GI referral previously prenatally, however, patient never scheduled    Pain: Well-controlled with ibuprofen and tylenol  Hgb: Known COLLEEN s/p 1 dose of Venofer prenatally.  hgb 9.4 on admission>>>7.8>IV iron on 2/13. VSS as noted above, asymptomatic.  Avoids PO iron given IBS and chronic constipation   GI:  BID Senna/Colace.  PRN Simethicone.   PPx:  Encouraged ambulation   Rh: Positive  Rubella: Immune  Feed: Breast  BC: S/p extensive counseling prenatally, LARC recommended    Dispo: Plan for home later this evening    Mikaela Cisneros MD   Pager: [phone]   February 15, 2023

## 2023-02-15 NOTE — PLAN OF CARE
Patient is up independent in room, meeting all personal and infant needs. Continue monitoring B/P's; no severe range. Patient denies any preeclamptic sx.. Pain is being managed with Tylenol and Ibuprofen. Encouraged walking to to help managed LE edema. Laxix 40 mg given oral x 1. Voiding with out difficulty. Incision to low abdomen is with island dressing, marked drainage no change, no signs of infection noted to surrounding tissue. Breastfeeding is going well and bonding well with infant. Is also pumping and bottle feeds own EBM. Dr. Cisneros would like B/P taken 1100, 1300 and around 1600 and to be called to discuss today's B/P's and discuss discharge plan. [facility] referral was faxed over by charge RN.  Patient has been guided to schedule weekly OB appointments for next 3 weeks for B/P check ins. Evening RN assumes all cares.

## 2023-02-15 NOTE — PLAN OF CARE
Blood pressures 140s/90s. Tachycardic. Up ad anjali. Ambulated in hallway, reported feeling light headed with that activity.  Reflexes normal, no clonus, edema noted in knees, ankles and feet. Attempted to flush forearm PIV, wouldn't flush and pt was reporting pain, removed. Cramping/perineal pain controlled with ibuprofen. Tolerating PO intake. Postpartum checks WNL. Voiding without difficulty. Breastfeeding q 2-3 hrs, pumping after feedings while FOB supplements infant with bottled maternal expressed breast milk. Significant other present and supportive. Bonding well with infant.

## 2023-02-16 NOTE — DISCHARGE INSTRUCTIONS
Preeclampsia   Call your doctor right away if you have any of the following:  - Edema (swelling) in your face or hands  - Rapid weight gain-about 1 pound or more in a day  - Headache  - Abdominal pain on your right side  - Vision problems (flashes or spots)  - You have questions or concerns once you return home.Postpartum Vaginal Delivery Instructions    Activity     Ask family and friends for help when you need it.  Do not place anything in your vagina for 6 weeks.  You are not restricted on other activities, but take it easy for a few weeks to allow your body to recover from delivery.  You are able to do any activities you feel up to that point.  No driving until you have stopped taking your pain medications (usually two weeks after delivery).     Call your health care provider if you have any of these symptoms:     Increased pain, swelling, redness, or fluid around your stiches from an episiotomy or perineal tear.  A fever above 100.4 F (38 C) with or without chills when placing a thermometer under your tongue.  You soak a sanitary pad with blood within 1 hour, or you see blood clots larger than a golf ball.  Bleeding that lasts more than 6 weeks.  Vaginal discharge that smells bad.  Severe pain, cramping or tenderness in your lower belly area.  A need to urinate more frequently (use the toilet more often), more urgently (use the toilet very quickly), or it burns when you urinate.  Nausea and vomiting.  Redness, swelling or pain around a vein in your leg.  Problems breastfeeding or a red or painful area on your breast.  Chest pain and cough or are gasping for air.  Problems coping with sadness, anxiety, or depression.  If you have any concerns about hurting yourself or the baby, call your provider immediately.   You have questions or concerns after you return home.     Keep your hands clean:  Always wash your hands before touching your perineal area and stitches.  This helps reduce your risk of infection.  If  your hands aren't dirty, you may use an alcohol hand-rub to clean your hands. Keep your nails clean and short.

## 2023-02-16 NOTE — PLAN OF CARE
VSS with the exception of slightly elevated blood pressures. MD aware and okay to discharge pt at this time with 60 mg of Nifedipine q 12 hrs. Pt will check blood pressures 2x per day and notify provider if BPs in 150s/100s. Edema improving, denies dizziness/headache. Reflexes WNL, no clonus. Bleeding scant, pain controlled on tylenol and ibuprofen. Breastfeeding and pumping. Bonding with infant.  Pt will follow up with provider early next week. Homecare visit for mom and baby to occur on Friday. Significant other present and supportive. Discharge instructions reviewed, all questions answered. Discharge meds given and reviewed. Home medications returned. Breastmilk verified with second RN and returned to pt. Bands verified and sensor removed from infants ankle. Pt discharged to home with infant at 2105.

## 2023-02-16 NOTE — PROVIDER NOTIFICATION
02/15/23 2015   Provider Notification   Provider Name/Title Dr. Bailey   Method of Notification Phone   Notification Reason Status Update     Reviewed pts blood pressures since 60 mg nifedipine dose at 1700. Okay to discharge patient at this time with follow up in the clinic early next week. Pt already has appointment scheduled for Monday.

## 2023-02-20 ENCOUNTER — PATIENT OUTREACH (OUTPATIENT)
Dept: DERMATOLOGY | Facility: CLINIC | Age: 28
End: 2023-02-20
Payer: COMMERCIAL

## 2023-02-20 NOTE — TELEPHONE ENCOUNTER
Attempted to reach patient to schedule follow up in the Dermatology Clinic.  No answer,  LM on VM to call office and [employer] message sent..    Schedule with Dr. Cornell Tracey 5/2023.

## 2023-02-28 ENCOUNTER — OFFICE VISIT (OUTPATIENT)
Dept: PHYSICAL MEDICINE AND REHAB | Facility: CLINIC | Age: 28
End: 2023-02-28
Payer: COMMERCIAL

## 2023-02-28 VITALS — HEART RATE: 113 BPM | DIASTOLIC BLOOD PRESSURE: 71 MMHG | SYSTOLIC BLOOD PRESSURE: 113 MMHG

## 2023-02-28 DIAGNOSIS — G43.719 CHRONIC MIGRAINE WITHOUT AURA, INTRACTABLE, WITHOUT STATUS MIGRAINOSUS: Primary | ICD-10-CM

## 2023-02-28 PROCEDURE — 95874 GUIDE NERV DESTR NEEDLE EMG: CPT | Performed by: PHYSICAL MEDICINE & REHABILITATION

## 2023-02-28 PROCEDURE — 64615 CHEMODENERV MUSC MIGRAINE: CPT | Performed by: PHYSICAL MEDICINE & REHABILITATION

## 2023-02-28 NOTE — LETTER
2/28/2023         RE: Samara Oropeza  [address]  [place] [address]        Dear Colleague,    Thank you for referring your patient, Samara Oropeza, to the [facility]. Please see a copy of my visit note below.      [facility]    PM&R CLINIC NOTE  BOTULINUM TOXIN PROCEDURE      HPI  Chief Complaint   Patient presents with     Botox     And Nerve Block injections       Samara Oropeza is a 27 year old female who presents to clinic for botulinum toxin injections for management of chronic migraine headaches.     SINCE LAST VISIT  Samara Oropeza was last seen here in clinic on 6/16/2022, at which time she received 150 units of Botox.     Patient reports the following new medical problems since last visit: She gave birth to a healthy baby girl earlier this month.    RESPONSE TO PREVIOUS TREATMENT    Side effects: No problems reported    1.  Headache frequency during this injection cycle: 8 milder headache days per month for the first two months followed by 12-20 migraine headache days per month over the last 3-4 weeks. This is compared to her baseline headache frequency of 30 headache days per month.     2.  Headache duration during this injection cycle:  Headache duration was usually one day. Patient reports a few episodes of multiple day headaches during this injection cycle, particularly as the Botox was wearing off.     3.  Headache intensity during this injection cycle:    A.  7-8/10  =  Typical pain level.  B.  10/10  =  Worst pain level.    C.  0/10  =  Lowest pain level.    4.  Change in headache medication usage during this injection cycle:  (For Example:  Able to decrease use of oral pain medications.) No changes.     5.  ER Visits During This Injection Cycle:  None.     6.  Functional Performance:  Change in ADL's, social interaction, days lost from work, etc. Patient reports being able to more  fully participate in social and family activities and responsibilities as headache symptoms have improved      PHYSICAL EXAM  VS: /71 (BP Location: Right arm, Patient Position: Sitting)   Pulse 113    GEN: Pleasant and cooperative, in no acute distress  HEENT: No facial asymmetry    ALLERGIES  Allergies   Allergen Reactions     Amoxil [Penicillins] Rash     Dad unsure of reaction.     Bee Venom Anaphylaxis     Bioflavonoids Anaphylaxis     Citrus Anaphylaxis     All Chugach     Contrast Dye Rash     Contrast Media [facility] [place] 04/09/2014.; Contrast [facility] [place] 04/09/2014.  NOTE: this is a contrast media oral with iodine. Premedicate with methylpred standard for IV contrast, request barium contrast for oral contrast.     Diagnostic X-Ray Materials Hives and Rash     Contrast [facility] [place], 04/09/2014.; Contrast [facility] [place], 04/09/2014.  NOTE: this is a contrast media oral with iodine. Premedicate with methylpred standard for IV contrast, request barium contrast for oral contrast.     Pineapple Anaphylaxis, Difficulty breathing and Rash     Reglan [Metoclopramide] Other (See Comments)     IV dose only, in ER, rapid heart rate.     Ace Inhibitors      Difficulty in breathing and GI upset     Amitiza [Lubiprostone] Nausea and Vomiting     Amoxicillin-Pot Clavulanate      Midazolam Unknown     parent states that when pt takes this medication, she wakes up being very violent .     Other [No Clinical Screening - See Comments]      Bleech/ chest tightness, itchy throat, swollen tongue, hives     Tizanidine Other (See Comments)     Confusion, back pain, photophobia, abdominal pain, shaking, anxious       Versed      Coming out of pelvic exam at age of 5, was kicking and screaming when coming out of the versed.     Adhesive Tape Rash     Azithromycin Hives and Rash     Cephalexin Itching and Rash     Sulfa Drugs Rash     Skin scarring       CURRENT MEDICATIONS    Current Outpatient  Medications:      acetaminophen (TYLENOL) 325 MG tablet, Take 650 mg by mouth every 6 hours as needed for mild pain, Disp: , Rfl:      albuterol (PROAIR HFA/PROVENTIL HFA/VENTOLIN HFA) 108 (90 Base) MCG/ACT inhaler, Inhale 2 puffs into the lungs every 6 hours as needed for shortness of breath / dyspnea or wheezing, Disp: 1 Inhaler, Rfl: 1     amitriptyline (ELAVIL) 25 MG tablet, TAKE 1 TABLET BY MOUTH EVERY NIGHT AT BEDTIME, Disp: 90 tablet, Rfl: 1     artificial tears OINT ophthalmic ointment, 0.5 inch strip each eye at night, Disp: 1 Tube, Rfl: 11     benzocaine (AMERICAINE) 20 % external aerosol, Apply to perineum four times daily as needed for pain, Disp: 57 g, Rfl: 3     benzocaine (TOPICALE XTRA) 20 % GEL, Apply as needed locally to mouth or nasal ulcers for pain; 4 times daily as needed, Disp: 30 g, Rfl: 1     betamethasone valerate (VALISONE) 0.1 % cream, Apply topically 2 times daily as needed , Disp: , Rfl:      bisacodyl (DULCOLAX) 5 MG EC tablet, Take 2 tablets (10 mg) by mouth daily as needed for constipation, Disp: 30 tablet, Rfl: 0     citalopram (CELEXA) 20 MG tablet, Take 1 tablet (20 mg) by mouth daily, Disp: 90 tablet, Rfl: 1     docusate sodium (COLACE) 100 MG capsule, Take 1 capsule (100 mg) by mouth daily, Disp: 30 capsule, Rfl: 0     enoxaparin ANTICOAGULANT (LOVENOX) 40 MG/0.4ML syringe, Inject 0.4 mLs (40 mg) Subcutaneous every 24 hours, Disp: 16 mL, Rfl: 1     enoxaparin ANTICOAGULANT (LOVENOX) 40 MG/0.4ML syringe, , Disp: , Rfl:      EPINEPHrine (EPIPEN 2-EAMON) 0.3 MG/0.3ML injection, Inject 0.3 mLs (0.3 mg) into the muscle as needed for anaphylaxis, Disp: 0.6 mL, Rfl: 3     fluocinonide (LIDEX) 0.05 % external gel, Apply topically 2 times daily, Disp: 60 g, Rfl: 3     gabapentin (NEURONTIN) 300 MG capsule, Take 1 capsule (300 mg) by mouth every morning, Disp: 60 capsule, Rfl: 1     hydrocortisone, Perianal, (ANUSOL-HC) 2.5 % cream, Place rectally 3 times daily as needed for hemorrhoids,  Disp: 30 g, Rfl: 0     hypromellose (GENTEAL) 0.3 % SOLN, 1 drop every hour as needed for dry eyes , Disp: , Rfl:      ibuprofen (ADVIL/MOTRIN) 800 MG tablet, Take 1 tablet (800 mg) by mouth every 6 hours as needed for other (cramping), Disp: 40 tablet, Rfl: 1     lactulose 20 GM/30ML SOLN, Take 30 mLs by mouth 3 times daily as needed (for constipation), Disp: 300 mL, Rfl: 3     lanolin ointment, Apply topically every hour as needed for other (sore nipples), Disp: 7 g, Rfl: 3     lidocaine (LMX4) 4 % external cream, Apply topically once as needed for mild pain, Disp: 120 g, Rfl: 1     LINZESS 290 MCG capsule, TAKE 1 CAPSULE BY MOUTH EVERY DAY IN THE MORNING BEFORE BREAKFAST, Disp: 90 capsule, Rfl: 0     mometasone (ELOCON) 0.1 % external ointment, Apply topically 2 times daily, Disp: 45 g, Rfl: 3     NIFEdipine ER OSMOTIC (ADALAT CC) 60 MG 24 hr tablet, Take 1 tablet (60 mg) by mouth every 12 hours, Disp: 90 tablet, Rfl: 1     polyethylene glycol (MIRALAX/GLYCOLAX) powder, Take 1 capful by mouth 3 times daily, Disp: , Rfl:      Prenatal MV-Min-Fe Fum-FA-DHA (PRENATAL 1 PO), , Disp: , Rfl:      sodium fluoride 1.1 % CREA, Apply 1 Application topically daily, Disp: , Rfl:      sucralfate (CARAFATE) 1 GM/10ML suspension, Take 10 mLs (1 g) by mouth 4 times daily (Patient taking differently: Take 1 g by mouth 2 times daily), Disp: 1200 mL, Rfl: 2     triamcinolone (KENALOG) 0.1 % external ointment, Apply twice daily as needed to lesions on the genitals and body. OK to use very sparingly on the face., Disp: 454 g, Rfl: 2     NIFEdipine ER OSMOTIC (PROCARDIA XL) 30 MG 24 hr tablet, Take 2 tablets (60 mg) by mouth daily (Patient not taking: Reported on 2/28/2023), Disp: 60 tablet, Rfl: 1     order for DME, Equipment being ordered: Trilok brace 8 1/2 left lower extremity, Disp: 1 Device, Rfl: 0     order for DME, Equipment being ordered: size 8 1/2 walking boot tall, Disp: 1 Device, Rfl: 0    Current Facility-Administered  Medications:      triamcinolone (KENALOG-40) injection 40 mg, 40 mg, , , Fleischmann, Andres, DPM, 40 mg at 10/11/21 0928     triamcinolone (KENALOG-40) injection 40 mg, 40 mg, , , Fleischmann, Andres, DPM, 40 mg at 06/15/21 0957     triamcinolone (KENALOG-40) injection 40 mg, 40 mg, , , Fleischmann, Andres, DPM, 40 mg at 09/15/20 1554       BOTULINUM NEUROTOXIN INJECTION PROCEDURES    VERIFICATION OF PATIENT IDENTIFICATION AND PROCEDURE     Initials   Patient Name SES   Patient  SES   Procedure Verified by: SES     Prior to the start of the procedure and with procedural staff participation, I verbally confirmed the patient s identity using two indicators, relevant allergies, that the procedure was appropriate and matched the consent or emergent situation, and that the correct equipment/implants were available. Immediately prior to starting the procedure I conducted the Time Out with the procedural staff and re-confirmed the patient s name, procedure, and site/side. (The Joint Commission universal protocol was followed.)  Yes    Sedation (Moderate or Deep): None    ABOVE ASSESSMENTS PERFORMED BY    Alejandrina Hernandez MD      INDICATIONS FOR PROCEDURES  Samara Oropeza is a 27 year old patient with pain secondary to the diagnosis of chronic migraine headaches. Her baseline symptoms have been recalcitrant to oral medications and conservative therapy.  She is here today for reinjection with Botox.    GOAL OF PROCEDURE  The goal of this procedure is to decrease pain.      TOTAL DOSE ADMINISTERED  Dose Administered:  200 units  Botox (Botulinum Toxin Type A)       2:1 Dilution   Unavoidable Drug Waste: No.  Diluent Used:  Preservative Free Normal Saline  Total Volume of Diluent Used:  4 ml  NDC #: Botox 100u ([address]-75)      CONSENT  The risks, benefits, and treatment options were discussed with Samara Oropeza and she agreed to proceed.    Written consent was obtained by [facility].     EQUIPMENT  USED  Needle-25mm stimulating/recording  Needle-30 gauge  EMG/NCS Machine    SKIN PREPARATION  Skin preparation was performed using an alcohol wipe.    GUIDANCE DESCRIPTION  Electro-myographic guidance was necessary throughout the neck portion of the procedure to accurately identify all areas of spastic muscles while avoiding injection of non-spastic muscles, neighboring nerves and nearby vascular structures.     AREA/MUSCLE INJECTED:  200 UNITS BOTOX = TOTAL DOSE     1 & 2. SHOULDER GIRDLE & NECK MUSCLES: 60 units Botox = Total Dose, 2:1 Dilution      Right Splenius Cervicis - 5 units of Botox over 2 site/s.   Left Splenius Cervicis - 5 units of Botox over 2 site/s.     Right Rectus Capitis - 2.5 units of Botox at 1 site.  Left Rectus Capitis - 2.5 units of Botox at 1site.     Right Levator Scapulae - 10 units of Botox over 2 site/s.   Left Levator Scapulae - 10 units of Botox over 2 site/s.    Right Anterior Scalene - 2.5 units of Botox over 1 site/s.   Left Anterior Scalene - 2.5 units of Botox over 1 site/s.     Right Sternocleidomastoid - 2.5 units of Botox over 1 site/s.   Left Sternocleidomastoid - 2.5 units of Botox over 1 site/s.     Right Rhomboid Major - 5 units of Botox over 1 site/s.    Left Rhomboid Major - 5 units of Botox over 1 site/s.      Right Pectoralis Minor - 2.5 units of Botox over 1 site/s.    Left Pectoralis Minor - 2.5 units of Botox over 1 site/s.     3. HEAD & SCALP MUSCLES: 140 units Botox = Total Dose, 2:1 Dilution     Right Occipitalis - 5 units of Botox over 1 site/s.   Left Occipitalis - 5 units of Botox over 1 site/s.     Right Frontalis - 10 units of Botox over 2 site/s.  Left Frontalis - 10 units of Botox over 2 site/s.     Right Temporalis - 50 units of Botox over 8 site/s.  Left Temporalis - 50 units of Botox over 8 site/s.     Right  - 2.5 units of Botox over 1 site/s.              Left  - 2.5 units of Botox over 1 site/s.                 Procerus - 5 units  of Botox at 1 site/s.      RESPONSE TO PROCEDURE  Samara Oropeza tolerated the procedure well and there were no immediate complications. She was allowed to recover for an appropriate period of time and was discharged home in stable condition.    ASSESSMENT AND PLAN   1. Botulinum toxin injections: Botox dose increased back up from 150 units to 200 units due to the fact that she is no longer pregnant. Patient will continue to monitor response and report at next appointment.   2. Referrals: None.   3. Follow up: Samara Oropeza was rescheduled for the next series of injections in 12 weeks, at which time we will evaluate response to today's injections. she may call the clinic prior with any questions or concerns prior to the next appointment.       Again, thank you for allowing me to participate in the care of your patient.        Sincerely,        Alejandrina Hernandez MD

## 2023-02-28 NOTE — NURSING NOTE
Chief Complaint   Patient presents with     Botox     And Nerve Block injections         KRISHNA Ardon on 2/28/2023 at 1:44 PM

## 2023-02-28 NOTE — PROGRESS NOTES
[facility]    PM&R CLINIC NOTE  BOTULINUM TOXIN PROCEDURE      HPI  Chief Complaint   Patient presents with     Botox     And Nerve Block injections       Samara Oropeza is a 27 year old female who presents to clinic for botulinum toxin injections for management of chronic migraine headaches.     SINCE LAST VISIT  Samara Oropeza was last seen here in clinic on 6/16/2022, at which time she received 150 units of Botox.     Patient reports the following new medical problems since last visit: She gave birth to a healthy baby girl earlier this month.    RESPONSE TO PREVIOUS TREATMENT    Side effects: No problems reported    1.  Headache frequency during this injection cycle: 8 milder headache days per month for the first two months followed by 12-20 migraine headache days per month over the last 3-4 weeks. This is compared to her baseline headache frequency of 30 headache days per month.     2.  Headache duration during this injection cycle:  Headache duration was usually one day. Patient reports a few episodes of multiple day headaches during this injection cycle, particularly as the Botox was wearing off.     3.  Headache intensity during this injection cycle:    A.  7-8/10  =  Typical pain level.  B.  10/10  =  Worst pain level.    C.  0/10  =  Lowest pain level.    4.  Change in headache medication usage during this injection cycle:  (For Example:  Able to decrease use of oral pain medications.) No changes.     5.  ER Visits During This Injection Cycle:  None.     6.  Functional Performance:  Change in ADL's, social interaction, days lost from work, etc. Patient reports being able to more fully participate in social and family activities and responsibilities as headache symptoms have improved      PHYSICAL EXAM  VS: /71 (BP Location: Right arm, Patient Position: Sitting)   Pulse 113    GEN: Pleasant and cooperative, in no acute distress  HEENT: No facial  asymmetry    ALLERGIES  Allergies   Allergen Reactions     Amoxil [Penicillins] Rash     Dad unsure of reaction.     Bee Venom Anaphylaxis     Bioflavonoids Anaphylaxis     Citrus Anaphylaxis     All [place]     Contrast Dye Rash     Contrast Media Ready-Box [facility] [place], 04/09/2014.; Contrast Media Ready-Box [facility] [place], 04/09/2014.  NOTE: this is a contrast media oral with iodine. Premedicate with methylpred standard for IV contrast, request barium contrast for oral contrast.     Diagnostic X-Ray Materials Hives and Rash     Contrast Media Ready-Box [facility] [place], 04/09/2014.; Contrast Media Ready-Box [facility] [place], 04/09/2014.  NOTE: this is a contrast media oral with iodine. Premedicate with methylpred standard for IV contrast, request barium contrast for oral contrast.     Pineapple Anaphylaxis, Difficulty breathing and Rash     Reglan [Metoclopramide] Other (See Comments)     IV dose only, in ER, rapid heart rate.     Ace Inhibitors      Difficulty in breathing and GI upset     Amitiza [Lubiprostone] Nausea and Vomiting     Amoxicillin-Pot Clavulanate      Midazolam Unknown     parent states that when pt takes this medication, she wakes up being very violent .     Other [No Clinical Screening - See Comments]      Bleech/ chest tightness, itchy throat, swollen tongue, hives     Tizanidine Other (See Comments)     Confusion, back pain, photophobia, abdominal pain, shaking, anxious       Versed      Coming out of pelvic exam at age of 5, was kicking and screaming when coming out of the versed.     Adhesive Tape Rash     Azithromycin Hives and Rash     Cephalexin Itching and Rash     Sulfa Drugs Rash     Skin scarring       CURRENT MEDICATIONS    Current Outpatient Medications:      acetaminophen (TYLENOL) 325 MG tablet, Take 650 mg by mouth every 6 hours as needed for mild pain, Disp: , Rfl:      albuterol (PROAIR HFA/PROVENTIL HFA/VENTOLIN HFA) 108 (90 Base) MCG/ACT inhaler, Inhale 2 puffs into the lungs every 6 hours as needed for shortness of  breath / dyspnea or wheezing, Disp: 1 Inhaler, Rfl: 1     amitriptyline (ELAVIL) 25 MG tablet, TAKE 1 TABLET BY MOUTH EVERY NIGHT AT BEDTIME, Disp: 90 tablet, Rfl: 1     artificial tears OINT ophthalmic ointment, 0.5 inch strip each eye at night, Disp: 1 Tube, Rfl: 11     benzocaine (AMERICAINE) 20 % external aerosol, Apply to perineum four times daily as needed for pain, Disp: 57 g, Rfl: 3     benzocaine (TOPICALE XTRA) 20 % GEL, Apply as needed locally to mouth or nasal ulcers for pain; 4 times daily as needed, Disp: 30 g, Rfl: 1     betamethasone valerate (VALISONE) 0.1 % cream, Apply topically 2 times daily as needed , Disp: , Rfl:      bisacodyl (DULCOLAX) 5 MG EC tablet, Take 2 tablets (10 mg) by mouth daily as needed for constipation, Disp: 30 tablet, Rfl: 0     citalopram (CELEXA) 20 MG tablet, Take 1 tablet (20 mg) by mouth daily, Disp: 90 tablet, Rfl: 1     docusate sodium (COLACE) 100 MG capsule, Take 1 capsule (100 mg) by mouth daily, Disp: 30 capsule, Rfl: 0     enoxaparin ANTICOAGULANT (LOVENOX) 40 MG/0.4ML syringe, Inject 0.4 mLs (40 mg) Subcutaneous every 24 hours, Disp: 16 mL, Rfl: 1     enoxaparin ANTICOAGULANT (LOVENOX) 40 MG/0.4ML syringe, , Disp: , Rfl:      EPINEPHrine (EPIPEN 2-EAMON) 0.3 MG/0.3ML injection, Inject 0.3 mLs (0.3 mg) into the muscle as needed for anaphylaxis, Disp: 0.6 mL, Rfl: 3     fluocinonide (LIDEX) 0.05 % external gel, Apply topically 2 times daily, Disp: 60 g, Rfl: 3     gabapentin (NEURONTIN) 300 MG capsule, Take 1 capsule (300 mg) by mouth every morning, Disp: 60 capsule, Rfl: 1     hydrocortisone, Perianal, (ANUSOL-HC) 2.5 % cream, Place rectally 3 times daily as needed for hemorrhoids, Disp: 30 g, Rfl: 0     hypromellose (GENTEAL) 0.3 % SOLN, 1 drop every hour as needed for dry eyes , Disp: , Rfl:      ibuprofen (ADVIL/MOTRIN) 800 MG tablet, Take 1 tablet (800 mg) by mouth every 6 hours as needed for other (cramping), Disp: 40 tablet, Rfl: 1     lactulose 20 GM/30ML  SOLN, Take 30 mLs by mouth 3 times daily as needed (for constipation), Disp: 300 mL, Rfl: 3     lanolin ointment, Apply topically every hour as needed for other (sore nipples), Disp: 7 g, Rfl: 3     lidocaine (LMX4) 4 % external cream, Apply topically once as needed for mild pain, Disp: 120 g, Rfl: 1     LINZESS 290 MCG capsule, TAKE 1 CAPSULE BY MOUTH EVERY DAY IN THE MORNING BEFORE BREAKFAST, Disp: 90 capsule, Rfl: 0     mometasone (ELOCON) 0.1 % external ointment, Apply topically 2 times daily, Disp: 45 g, Rfl: 3     NIFEdipine ER OSMOTIC (ADALAT CC) 60 MG 24 hr tablet, Take 1 tablet (60 mg) by mouth every 12 hours, Disp: 90 tablet, Rfl: 1     polyethylene glycol (MIRALAX/GLYCOLAX) powder, Take 1 capful by mouth 3 times daily, Disp: , Rfl:      Prenatal MV-Min-Fe Fum-FA-DHA (PRENATAL 1 PO), , Disp: , Rfl:      sodium fluoride 1.1 % CREA, Apply 1 Application topically daily, Disp: , Rfl:      sucralfate (CARAFATE) 1 GM/10ML suspension, Take 10 mLs (1 g) by mouth 4 times daily (Patient taking differently: Take 1 g by mouth 2 times daily), Disp: 1200 mL, Rfl: 2     triamcinolone (KENALOG) 0.1 % external ointment, Apply twice daily as needed to lesions on the genitals and body. OK to use very sparingly on the face., Disp: 454 g, Rfl: 2     NIFEdipine ER OSMOTIC (PROCARDIA XL) 30 MG 24 hr tablet, Take 2 tablets (60 mg) by mouth daily (Patient not taking: Reported on 2/28/2023), Disp: 60 tablet, Rfl: 1     order for DME, Equipment being ordered: Trilok brace 8 1/2 left lower extremity, Disp: 1 Device, Rfl: 0     order for DME, Equipment being ordered: size 8 1/2 walking boot tall, Disp: 1 Device, Rfl: 0    Current Facility-Administered Medications:      triamcinolone (KENALOG-40) injection 40 mg, 40 mg, , , Andres Johnson DPM, 40 mg at 10/11/21 0928     triamcinolone (KENALOG-40) injection 40 mg, 40 mg, , , Andres Johnson, ROSIBEL, 40 mg at 06/15/21 0957     triamcinolone (KENALOG-40) injection 40 mg, 40 mg, , ,  Andres Johnson, ROSIBEL, 40 mg at 09/15/20 1554       BOTULINUM NEUROTOXIN INJECTION PROCEDURES    VERIFICATION OF PATIENT IDENTIFICATION AND PROCEDURE     Initials   Patient Name SES   Patient  SES   Procedure Verified by: DEYSI     Prior to the start of the procedure and with procedural staff participation, I verbally confirmed the patient s identity using two indicators, relevant allergies, that the procedure was appropriate and matched the consent or emergent situation, and that the correct equipment/implants were available. Immediately prior to starting the procedure I conducted the Time Out with the procedural staff and re-confirmed the patient s name, procedure, and site/side. (The Joint Commission universal protocol was followed.)  Yes    Sedation (Moderate or Deep): None    ABOVE ASSESSMENTS PERFORMED BY    Alejandrina Hernandez MD      INDICATIONS FOR PROCEDURES  Samara Oropeza is a 27 year old patient with pain secondary to the diagnosis of chronic migraine headaches. Her baseline symptoms have been recalcitrant to oral medications and conservative therapy.  She is here today for reinjection with Botox.    GOAL OF PROCEDURE  The goal of this procedure is to decrease pain.      TOTAL DOSE ADMINISTERED  Dose Administered:  200 units  Botox (Botulinum Toxin Type A)       2:1 Dilution   Unavoidable Drug Waste: No.  Diluent Used:  Preservative Free Normal Saline  Total Volume of Diluent Used:  4 ml  NDC #: Botox 100u ([id number])      CONSENT  The risks, benefits, and treatment options were discussed with Samara Oropeza and she agreed to proceed.    Written consent was obtained by [facility].     EQUIPMENT USED  Needle-25mm stimulating/recording  Needle-30 gauge  EMG/NCS Machine    SKIN PREPARATION  Skin preparation was performed using an alcohol wipe.    GUIDANCE DESCRIPTION  Electro-myographic guidance was necessary throughout the neck portion of the procedure to accurately identify all areas of spastic  muscles while avoiding injection of non-spastic muscles, neighboring nerves and nearby vascular structures.     AREA/MUSCLE INJECTED:  200 UNITS BOTOX = TOTAL DOSE     1 & 2. SHOULDER GIRDLE & NECK MUSCLES: 60 units Botox = Total Dose, 2:1 Dilution      Right Splenius Cervicis - 5 units of Botox over 2 site/s.   Left Splenius Cervicis - 5 units of Botox over 2 site/s.     Right Rectus Capitis - 2.5 units of Botox at 1 site.  Left Rectus Capitis - 2.5 units of Botox at 1site.     Right Levator Scapulae - 10 units of Botox over 2 site/s.   Left Levator Scapulae - 10 units of Botox over 2 site/s.    Right Anterior Scalene - 2.5 units of Botox over 1 site/s.   Left Anterior Scalene - 2.5 units of Botox over 1 site/s.     Right Sternocleidomastoid - 2.5 units of Botox over 1 site/s.   Left Sternocleidomastoid - 2.5 units of Botox over 1 site/s.     Right Rhomboid Major - 5 units of Botox over 1 site/s.    Left Rhomboid Major - 5 units of Botox over 1 site/s.      Right Pectoralis Minor - 2.5 units of Botox over 1 site/s.    Left Pectoralis Minor - 2.5 units of Botox over 1 site/s.     3. HEAD & SCALP MUSCLES: 140 units Botox = Total Dose, 2:1 Dilution     Right Occipitalis - 5 units of Botox over 1 site/s.   Left Occipitalis - 5 units of Botox over 1 site/s.     Right Frontalis - 10 units of Botox over 2 site/s.  Left Frontalis - 10 units of Botox over 2 site/s.     Right Temporalis - 50 units of Botox over 8 site/s.  Left Temporalis - 50 units of Botox over 8 site/s.     Right  - 2.5 units of Botox over 1 site/s.              Left  - 2.5 units of Botox over 1 site/s.                 Procerus - 5 units of Botox at 1 site/s.      RESPONSE TO PROCEDURE  Samara Oropeza tolerated the procedure well and there were no immediate complications. She was allowed to recover for an appropriate period of time and was discharged home in stable condition.    ASSESSMENT AND PLAN   1. Botulinum toxin injections:  Botox dose increased back up from 150 units to 200 units due to the fact that she is no longer pregnant. Patient will continue to monitor response and report at next appointment.   2. Referrals: None.   3. Follow up: Samara Oropeza was rescheduled for the next series of injections in 12 weeks, at which time we will evaluate response to today's injections. she may call the clinic prior with any questions or concerns prior to the next appointment.

## 2023-03-03 ENCOUNTER — MYC MEDICAL ADVICE (OUTPATIENT)
Dept: RHEUMATOLOGY | Facility: CLINIC | Age: 28
End: 2023-03-03
Payer: COMMERCIAL

## 2023-03-03 DIAGNOSIS — M35.2 BEHCET'S DISEASE (H): Primary | ICD-10-CM

## 2023-03-30 ENCOUNTER — HOSPITAL ENCOUNTER (EMERGENCY)
Facility: CLINIC | Age: 28
Discharge: HOME | End: 2023-03-31
Attending: EMERGENCY MEDICINE | Admitting: EMERGENCY MEDICINE
Payer: COMMERCIAL

## 2023-03-30 DIAGNOSIS — R10.84 ABDOMINAL PAIN, GENERALIZED: ICD-10-CM

## 2023-03-30 DIAGNOSIS — R06.00 DYSPNEA, UNSPECIFIED TYPE: ICD-10-CM

## 2023-03-30 LAB
ALBUMIN SERPL BCG-MCNC: 5 G/DL (ref 3.5–5.2)
ALP SERPL-CCNC: 100 U/L (ref 35–104)
ALT SERPL W P-5'-P-CCNC: 19 U/L (ref 10–35)
ANION GAP SERPL CALCULATED.3IONS-SCNC: 13 MMOL/L (ref 7–15)
AST SERPL W P-5'-P-CCNC: 24 U/L (ref 10–35)
BASOPHILS # BLD AUTO: 0 10E3/UL (ref 0–0.2)
BASOPHILS NFR BLD AUTO: 0 %
BILIRUB DIRECT SERPL-MCNC: <0.2 MG/DL (ref 0–0.3)
BILIRUB SERPL-MCNC: 0.3 MG/DL
BUN SERPL-MCNC: 10.2 MG/DL (ref 6–20)
CALCIUM SERPL-MCNC: 9.9 MG/DL (ref 8.6–10)
CHLORIDE SERPL-SCNC: 99 MMOL/L (ref 98–107)
CREAT SERPL-MCNC: 0.71 MG/DL (ref 0.51–0.95)
DEPRECATED HCO3 PLAS-SCNC: 25 MMOL/L (ref 22–29)
EOSINOPHIL # BLD AUTO: 0.1 10E3/UL (ref 0–0.7)
EOSINOPHIL NFR BLD AUTO: 3 %
ERYTHROCYTE [DISTWIDTH] IN BLOOD BY AUTOMATED COUNT: 14.6 % (ref 10–15)
GFR SERPL CREATININE-BSD FRML MDRD: >90 ML/MIN/1.73M2
GLUCOSE SERPL-MCNC: 79 MG/DL (ref 70–99)
HCT VFR BLD AUTO: 39.4 % (ref 35–47)
HGB BLD-MCNC: 12.7 G/DL (ref 11.7–15.7)
HOLD SPECIMEN: NORMAL
HOLD SPECIMEN: NORMAL
IMM GRANULOCYTES # BLD: 0 10E3/UL
IMM GRANULOCYTES NFR BLD: 0 %
LIPASE SERPL-CCNC: 32 U/L (ref 13–60)
LYMPHOCYTES # BLD AUTO: 2.2 10E3/UL (ref 0.8–5.3)
LYMPHOCYTES NFR BLD AUTO: 50 %
MCH RBC QN AUTO: 28.7 PG (ref 26.5–33)
MCHC RBC AUTO-ENTMCNC: 32.2 G/DL (ref 31.5–36.5)
MCV RBC AUTO: 89 FL (ref 78–100)
MONOCYTES # BLD AUTO: 0.3 10E3/UL (ref 0–1.3)
MONOCYTES NFR BLD AUTO: 6 %
NEUTROPHILS # BLD AUTO: 1.9 10E3/UL (ref 1.6–8.3)
NEUTROPHILS NFR BLD AUTO: 41 %
NRBC # BLD AUTO: 0 10E3/UL
NRBC BLD AUTO-RTO: 0 /100
PLATELET # BLD AUTO: 226 10E3/UL (ref 150–450)
POTASSIUM SERPL-SCNC: 3.9 MMOL/L (ref 3.4–5.3)
PROT SERPL-MCNC: 7.3 G/DL (ref 6.4–8.3)
RBC # BLD AUTO: 4.42 10E6/UL (ref 3.8–5.2)
SODIUM SERPL-SCNC: 137 MMOL/L (ref 136–145)
WBC # BLD AUTO: 4.6 10E3/UL (ref 4–11)

## 2023-03-30 PROCEDURE — 83690 ASSAY OF LIPASE: CPT | Performed by: EMERGENCY MEDICINE

## 2023-03-30 PROCEDURE — 85025 COMPLETE CBC W/AUTO DIFF WBC: CPT | Performed by: EMERGENCY MEDICINE

## 2023-03-30 PROCEDURE — 99285 EMERGENCY DEPT VISIT HI MDM: CPT | Mod: 25

## 2023-03-30 PROCEDURE — 82248 BILIRUBIN DIRECT: CPT | Performed by: EMERGENCY MEDICINE

## 2023-03-30 PROCEDURE — 36415 COLL VENOUS BLD VENIPUNCTURE: CPT | Performed by: EMERGENCY MEDICINE

## 2023-03-30 PROCEDURE — 80053 COMPREHEN METABOLIC PANEL: CPT | Performed by: EMERGENCY MEDICINE

## 2023-03-30 PROCEDURE — 96375 TX/PRO/DX INJ NEW DRUG ADDON: CPT

## 2023-03-30 PROCEDURE — 96374 THER/PROPH/DIAG INJ IV PUSH: CPT | Mod: 59

## 2023-03-30 RX ORDER — DIPHENHYDRAMINE HYDROCHLORIDE 50 MG/ML
50 INJECTION INTRAMUSCULAR; INTRAVENOUS ONCE
Status: COMPLETED | OUTPATIENT
Start: 2023-03-30 | End: 2023-03-31

## 2023-03-30 RX ORDER — METHYLPREDNISOLONE SODIUM SUCCINATE 125 MG/2ML
125 INJECTION, POWDER, LYOPHILIZED, FOR SOLUTION INTRAMUSCULAR; INTRAVENOUS ONCE
Status: COMPLETED | OUTPATIENT
Start: 2023-03-30 | End: 2023-03-31

## 2023-03-30 ASSESSMENT — ACTIVITIES OF DAILY LIVING (ADL): ADLS_ACUITY_SCORE: 33

## 2023-03-31 ENCOUNTER — APPOINTMENT (OUTPATIENT)
Dept: CT IMAGING | Facility: CLINIC | Age: 28
End: 2023-03-31
Attending: EMERGENCY MEDICINE
Payer: COMMERCIAL

## 2023-03-31 VITALS
HEART RATE: 85 BPM | TEMPERATURE: 97.9 F | SYSTOLIC BLOOD PRESSURE: 128 MMHG | DIASTOLIC BLOOD PRESSURE: 78 MMHG | RESPIRATION RATE: 18 BRPM | OXYGEN SATURATION: 98 %

## 2023-03-31 PROCEDURE — 250N000011 HC RX IP 250 OP 636: Performed by: EMERGENCY MEDICINE

## 2023-03-31 PROCEDURE — 74177 CT ABD & PELVIS W/CONTRAST: CPT

## 2023-03-31 RX ORDER — IOPAMIDOL 755 MG/ML
500 INJECTION, SOLUTION INTRAVASCULAR ONCE
Status: COMPLETED | OUTPATIENT
Start: 2023-03-31 | End: 2023-03-31

## 2023-03-31 RX ADMIN — DIPHENHYDRAMINE HYDROCHLORIDE 50 MG: 50 INJECTION INTRAMUSCULAR; INTRAVENOUS at 00:34

## 2023-03-31 RX ADMIN — METHYLPREDNISOLONE SODIUM SUCCINATE 125 MG: 125 INJECTION, POWDER, FOR SOLUTION INTRAMUSCULAR; INTRAVENOUS at 00:24

## 2023-03-31 RX ADMIN — IOPAMIDOL 61 ML: 755 INJECTION, SOLUTION INTRAVENOUS at 00:53

## 2023-03-31 ASSESSMENT — ACTIVITIES OF DAILY LIVING (ADL): ADLS_ACUITY_SCORE: 35

## 2023-03-31 NOTE — ED TRIAGE NOTES
LLQ abdominal pain since noon wrapping into groin.  With lightheadedness and shaking.  Vaginal birth 2/[birth year]33.  Hx of preeclampsia.      Triage Assessment     Row Name 03/30/23 2118       Triage Assessment (Adult)    Airway WDL WDL       Respiratory WDL    Respiratory WDL rhythm/pattern    Rhythm/Pattern, Respiratory shortness of breath       Skin Circulation/Temperature WDL    Skin Circulation/Temperature WDL WDL       Cardiac WDL    Cardiac WDL WDL       Peripheral/Neurovascular WDL    Peripheral Neurovascular WDL WDL       Cognitive/Neuro/Behavioral WDL    Cognitive/Neuro/Behavioral WDL WDL

## 2023-03-31 NOTE — ED PROVIDER NOTES
History     Chief Complaint:  Abdominal Pain and Dizziness       HPI   Samara Oropeza is a 27 year old female with a history of preeclampsia and vaginal birth mid February who presents for evaluation abdominal pain, occasionally with lightheadedness and shortness of breath.  Patient states that she has had general abdominal cramping since approximately noon today.  Occasionally feels she feels lightheaded and shortness of breath, but this is transient.  She denies current dyspnea and denies any fever, chills, nausea, vomiting, chest pain, dysuria, frequency, or any other concerns.      Review of External Notes: reviewed 2/15/23 discharge summary     ROS:  Review of Systems    Allergies:  Amoxil [Penicillins]  Bee Venom  Bioflavonoids  Citrus  Contrast Dye  Diagnostic X-Ray Materials  Pineapple  Reglan [Metoclopramide]  Ace Inhibitors  Amitiza [Lubiprostone]  Amoxicillin-Pot Clavulanate  Midazolam  Other [No Clinical Screening - See Comments]  Tizanidine  Versed  Adhesive Tape  Azithromycin  Cephalexin  Sulfa Drugs     Medications:    acetaminophen (TYLENOL) 325 MG tablet  albuterol (PROAIR HFA/PROVENTIL HFA/VENTOLIN HFA) 108 (90 Base) MCG/ACT inhaler  amitriptyline (ELAVIL) 25 MG tablet  apremilast (OTEZLA) 30 MG tablet  artificial tears OINT ophthalmic ointment  benzocaine (AMERICAINE) 20 % external aerosol  benzocaine (TOPICALE XTRA) 20 % GEL  betamethasone valerate (VALISONE) 0.1 % cream  bisacodyl (DULCOLAX) 5 MG EC tablet  citalopram (CELEXA) 20 MG tablet  docusate sodium (COLACE) 100 MG capsule  enoxaparin ANTICOAGULANT (LOVENOX) 40 MG/0.4ML syringe  enoxaparin ANTICOAGULANT (LOVENOX) 40 MG/0.4ML syringe  EPINEPHrine (EPIPEN 2-EAMON) 0.3 MG/0.3ML injection  fluocinonide (LIDEX) 0.05 % external gel  gabapentin (NEURONTIN) 300 MG capsule  hydrocortisone, Perianal, (ANUSOL-HC) 2.5 % cream  hypromellose (GENTEAL) 0.3 % SOLN  ibuprofen (ADVIL/MOTRIN) 800 MG tablet  lactulose 20 GM/30ML SOLN  lanolin  ointment  lidocaine (LMX4) 4 % external cream  LINZESS 290 MCG capsule  mometasone (ELOCON) 0.1 % external ointment  NIFEdipine ER OSMOTIC (ADALAT CC) 60 MG 24 hr tablet  NIFEdipine ER OSMOTIC (PROCARDIA XL) 30 MG 24 hr tablet  order for DME  order for DME  polyethylene glycol (MIRALAX/GLYCOLAX) powder  Prenatal MV-Min-Fe Fum-FA-DHA (PRENATAL 1 PO)  sodium fluoride 1.1 % CREA  sucralfate (CARAFATE) 1 GM/10ML suspension  triamcinolone (KENALOG) 0.1 % external ointment        Past Medical History:    Past Medical History:   Diagnosis Date     Anemia      Anxiety      Arthritis      Behcet's disease (H)      Cervical adenitis May 2010     Chronic abdominal pain      Constipation, chronic 1994     Fibromyalgia      Gastro-oesophageal reflux disease      Gastroparesis      Irregular heart beat      Migraines      Neuromuscular disorder (H)      Palpitations      PONV (postoperative nausea and vomiting)      Seizure (H)      Seizures (H)      Syncope      Tourette's        Past Surgical History:    Past Surgical History:   Procedure Laterality Date     ARTHROSCOPY ANKLE, OPEN REPAIR LIGAMENT, COMBINED Left 9/25/2019    Procedure: LEFT ANKLE ARTHROSCOPY WITH LIGAMENT REPAIR;  Surgeon: Andres Johnson DPM;  Location:  OR     ARTHROSCOPY ANKLE, REPAIR LIGAMENT Left 1/2/2019    Procedure: Ankle arthroscopy and sinus tarsi evacuation, ligament repair, left lower extremity;  Surgeon: Andres Johnson DPM;  Location:  OR     ARTHROSCOPY KNEE WITH PATELLAR REALIGNMENT  7/25/2013    Procedure: ARTHROSCOPY KNEE WITH PATELLAR REALIGNMENT;  Left Knee Arthroscopy, Medial Patellofemoral Ligament Reconstruction with Allograft  ;  Surgeon: Jennifer Acevedo MD;  Location:  OR     COLONOSCOPY  2015     DENTAL SURGERY  1996    Teeth removal     ENDOSCOPY UPPER, COLONOSCOPY, COMBINED  2005      ESOPH/GAS REFLUX TEST W NASAL IMPED >1 HR N/A 2/15/2017    Procedure: ESOPHAGEAL IMPEDENCE FUNCTION TEST WITH 24 HOUR PH GREATER  THAN 1 HOUR;  Surgeon: Timothy Matta MD;  Location: [facility]     IR PICC PLACEMENT > 5 YRS OF AGE  3/13/2019     IR UPPER EXTREMITY VENOGRAM LEFT  2/25/2020     IRRIGATION AND DEBRIDEMENT FOOT, COMBINED Left 3/12/2019    Procedure: COMBINED IRRIGATION AND DEBRIDEMENT LEFT ANKLE;  Surgeon: Micha Glover MD;  Location: [facility]     IRRIGATION AND DEBRIDEMENT LOWER EXTREMITY, COMBINED Left 5/7/2019    Procedure: 1.  Excision of wound down to and including deep fascia, less than 20 cm2.  2.  Irrigation and debridement, left ankle.;  Surgeon: Andres Johnson DPM;  Location: [facility] OR     PICC INSERTION Left 05/05/2019    4Fr - 45cm (5cm external), Basilic vein, SVC RA junction        Family History:    family history includes Alcohol/Drug in her father and maternal grandfather; Alzheimer Disease in her maternal grandmother; Cardiovascular in her maternal grandfather, maternal grandmother, paternal grandfather, and paternal grandmother; Depression in her father, maternal grandfather, maternal grandmother, and mother; Diabetes in her maternal grandfather; Hypertension in her father, maternal grandfather, maternal grandmother, paternal grandfather, and paternal grandmother; Neurologic Disorder in her mother; Osteoarthritis in her father.    Social History:   reports that she has never smoked. She has never used smokeless tobacco. She reports that she does not currently use alcohol after a past usage of about 1.0 standard drink per week. She reports that she does not currently use drugs after having used the following drugs: Marijuana.  PCP: Jacobo [place]     Physical Exam     Patient Vitals for the past 24 hrs:   BP Temp Temp src Pulse Resp SpO2   03/31/23 0142 128/78 -- -- 85 18 98 %   03/30/23 2149 115/86 -- -- -- -- --   03/30/23 2117 110/87 97.9  F (36.6  C) Oral 97 18 98 %        Physical Exam  Constitutional: Alert, attentive  HENT:    Nose: Nose normal.    Mouth/Throat: Oropharynx  is clear, mucous membranes are moist   Eyes: EOM are normal.   CV: regular rate and rhythm; no murmurs, rubs or gallups  Chest: Effort normal and breath sounds normal.   GI:  There is periumbilical tenderness. No distension. Normal bowel sounds  MSK: Normal range of motion.   Neurological: Alert, attentive  Skin: Skin is warm and dry.      Emergency Department Course       Imaging:  CT Chest (PE) Abdomen Pelvis w Contrast   Final Result   IMPRESSION:   1.  No evidence of pulmonary embolus to the sub-segmental level   2.  no CT evidence of acute intrathoracic or intra-abdominal process.        Report per radiology    Laboratory:  Labs Ordered and Resulted from Time of ED Arrival to Time of ED Departure   BASIC METABOLIC PANEL - Normal       Result Value    Sodium 137      Potassium 3.9      Chloride 99      Carbon Dioxide (CO2) 25      Anion Gap 13      Urea Nitrogen 10.2      Creatinine 0.71      Calcium 9.9      Glucose 79      GFR Estimate >90     HEPATIC FUNCTION PANEL - Normal    Protein Total 7.3      Albumin 5.0      Bilirubin Total 0.3      Alkaline Phosphatase 100      AST 24      ALT 19      Bilirubin Direct <0.20     LIPASE - Normal    Lipase 32     CBC WITH PLATELETS AND DIFFERENTIAL    WBC Count 4.6      RBC Count 4.42      Hemoglobin 12.7      Hematocrit 39.4      MCV 89      MCH 28.7      MCHC 32.2      RDW 14.6      Platelet Count 226      % Neutrophils 41      % Lymphocytes 50      % Monocytes 6      % Eosinophils 3      % Basophils 0      % Immature Granulocytes 0      NRBCs per 100 WBC 0      Absolute Neutrophils 1.9      Absolute Lymphocytes 2.2      Absolute Monocytes 0.3      Absolute Eosinophils 0.1      Absolute Basophils 0.0      Absolute Immature Granulocytes 0.0      Absolute NRBCs 0.0          Emergency Department Course & Assessments:      Interventions:  Medications   diphenhydrAMINE (BENADRYL) injection 50 mg (50 mg Intravenous $Given 3/31/23 0034)   methylPREDNISolone sodium succinate  (solu-MEDROL) injection 125 mg (125 mg Intravenous $Given 3/31/23 0024)   sodium chloride (PF) 0.9% PF flush 100 mL (81 mLs Intravenous $Given 3/31/23 0053)   iopamidol (ISOVUE-370) solution 500 mL (61 mLs Intravenous $Given 3/31/23 0053)        Independent Interpretation (X-rays, CTs, rhythm strip):  No PTX on CT CAP; no hemoperitoneum    Disposition:  The patient was discharged to home.     Impression & Plan        Medical Decision Making:  This is a pleasant 27-year-old female with recent vaginal delivery and history of preeclampsia presents for evaluation of abdominal pain, and occasional dizziness and dyspnea.  The patient is asymptomatic at this time except for abdominal pain.  Given the above, CT chest with PE protocol and abdomen pelvis were performed.  Fortunately there is no evidence of significant PE or other acute cardiopulmonary process.  Labs are reassuringly normal.  Abdomen pelvis series showed no acute process.  I emphasized the unclear nature of her symptoms.  Plan primary care follow-up for recheck in 2 to 3 days and return precautions for worse pain, fever, vomiting, or any other concerns.    Diagnosis:    ICD-10-CM    1. Abdominal pain, generalized  R10.84       2. Dyspnea, unspecified type  R06.00               Antwan Farrell MD  03/31/23 6346

## 2023-04-16 ENCOUNTER — HEALTH MAINTENANCE LETTER (OUTPATIENT)
Age: 28
End: 2023-04-16

## 2023-04-17 ENCOUNTER — TELEPHONE (OUTPATIENT)
Dept: RHEUMATOLOGY | Facility: CLINIC | Age: 28
End: 2023-04-17
Payer: COMMERCIAL

## 2023-04-17 NOTE — TELEPHONE ENCOUNTER
PA Initiation    Medication: Otezla PA renewal - pending  Insurance Company: [employer] - Phone [phone] Fax [phone]  Pharmacy Filling the Rx: [facility] - [place], [place] - [facility] [address]  Filling Pharmacy Phone:    Filling Pharmacy Fax:    Start Date: 4/17/2023    [id number]

## 2023-04-18 NOTE — TELEPHONE ENCOUNTER
Prior Authorization Approval    Authorization Effective Date: 3/17/2023  Authorization Expiration Date: 4/17/2024  Medication: Otezla PA renewal - Approved  Approved Dose/Quantity: 60 tabs per 30 days  Reference #: [id number]   Insurance Company: [employer] - Phone [phone] Fax [phone]  Expected CoPay:       CoPay Card Available:      Foundation Assistance Needed:    Which Pharmacy is filling the prescription (Not needed for infusion/clinic administered): [facility] - [place], [place] - [address]  Pharmacy Notified: Yes  Patient Notified: Yes

## 2023-05-12 ENCOUNTER — VIRTUAL VISIT (OUTPATIENT)
Dept: DERMATOLOGY | Facility: CLINIC | Age: 28
End: 2023-05-12
Payer: COMMERCIAL

## 2023-05-12 DIAGNOSIS — L90.5 SCARRING: ICD-10-CM

## 2023-05-12 DIAGNOSIS — M35.2 BEHCET RECURRENT DISEASE (H): Primary | ICD-10-CM

## 2023-05-12 DIAGNOSIS — L81.0 POSTINFLAMMATORY HYPERPIGMENTATION: ICD-10-CM

## 2023-05-12 DIAGNOSIS — D84.9 IMMUNOSUPPRESSION (H): ICD-10-CM

## 2023-05-12 PROCEDURE — 99213 OFFICE O/P EST LOW 20 MIN: CPT | Mod: VID | Performed by: DERMATOLOGY

## 2023-05-12 NOTE — PROGRESS NOTES
[facility] Dermatology Note  Encounter Date: May 12, 2023  Robert Connected.    Dermatology Problem List:  1. Behcet's syndrome   - Diagnosed 2014, primarily managed by rheumatology  - Periodic painful oral/genital (scarring) ulcers, folliculitis, hx positive pathergy test, prior success with apremilast (holding for pregnancy)  - Current: apremilast 30 mg BID per rheum, mometasone ointment, fluocinonide gel  - Prior: colchicine (holding for pregnancy), adalimumab (ineffective), triamcinolone paste for oral lesions, triamcinolone 0.1% oint BID PRN for genital/body lesions  - Future: certolizumab       ____________________________________________    Assessment & Plan:     1. Behcet's: having some oral and genital involvement but good cutaneous response to topical mometasone and also on apremilast. Currently breastfeeding. We discussed use of fluocinonide gel for mucosal surfaces and can continue rest of regimen. For postinflammatory hyperpigmentation, we discussed treatment with hydroquinone but defer for now since breastfeeding and areas of involvement are on chest, etc.  - apremilast 30 mg BID  - topicals    Procedures Performed:    None    Follow-up: 3-6 months    Staff:     Cornell Tracey MD, FAAD   of Dermatology  Department of Dermatology  [facility]    ____________________________________________    CC: Telephone (Behcet follow up)    HPI:  Ms. Samara Oropeza is a(n) 27 year old female who presents today as a return patient for Behcet's disease    Behcet - overall doing well  - topicals have been very helpful - having lots of residual scarring - primarily chest, back, arms, stomach  - on apremilast  - currently breastfeeding  - had recent more severe genital ulcer - went to gynecology  - mouth is okay - a few active spots on the roof of the mouth    Patient is otherwise feeling well, without additional skin concerns.    Labs  Reviewed:  N/A    Physical Exam:  Vitals: There were no vitals taken for this visit.  SKIN: video images were reviewed; image quality and interpretability: acceptable. Image date: n/a.  - hyperpigmented macules on the face and upper chest  - No other lesions of concern on areas examined.     Medications:  Current Outpatient Medications   Medication     acetaminophen (TYLENOL) 325 MG tablet     albuterol (PROAIR HFA/PROVENTIL HFA/VENTOLIN HFA) 108 (90 Base) MCG/ACT inhaler     amitriptyline (ELAVIL) 25 MG tablet     apremilast (OTEZLA) 30 MG tablet     artificial tears OINT ophthalmic ointment     benzocaine (TOPICALE XTRA) 20 % GEL     betamethasone valerate (VALISONE) 0.1 % cream     bisacodyl (DULCOLAX) 5 MG EC tablet     citalopram (CELEXA) 20 MG tablet     EPINEPHrine (EPIPEN 2-EAMON) 0.3 MG/0.3ML injection     fluocinonide (LIDEX) 0.05 % external gel     gabapentin (NEURONTIN) 300 MG capsule     hydrocortisone, Perianal, (ANUSOL-HC) 2.5 % cream     hypromellose (GENTEAL) 0.3 % SOLN     lanolin ointment     lidocaine (LMX4) 4 % external cream     LINZESS 290 MCG capsule     mometasone (ELOCON) 0.1 % external ointment     polyethylene glycol (MIRALAX/GLYCOLAX) powder     Prenatal MV-Min-Fe Fum-FA-DHA (PRENATAL 1 PO)     sodium fluoride 1.1 % CREA     triamcinolone (KENALOG) 0.1 % external ointment     benzocaine (AMERICAINE) 20 % external aerosol     docusate sodium (COLACE) 100 MG capsule     enoxaparin ANTICOAGULANT (LOVENOX) 40 MG/0.4ML syringe     enoxaparin ANTICOAGULANT (LOVENOX) 40 MG/0.4ML syringe     ibuprofen (ADVIL/MOTRIN) 800 MG tablet     lactulose 20 GM/30ML SOLN     NIFEdipine ER OSMOTIC (ADALAT CC) 60 MG 24 hr tablet     NIFEdipine ER OSMOTIC (PROCARDIA XL) 30 MG 24 hr tablet     order for DME     order for DME     sucralfate (CARAFATE) 1 GM/10ML suspension     Current Facility-Administered Medications   Medication     botulinum toxin type A (BOTOX) 100 units injection 200 Units     triamcinolone  (KENALOG-40) injection 40 mg     triamcinolone (KENALOG-40) injection 40 mg     triamcinolone (KENALOG-40) injection 40 mg      Past Medical/Surgical History:   Patient Active Problem List   Diagnosis     Tics - Tourette syndrome     IBS (irritable bowel syndrome)     Allergic rhinitis     Generalized anxiety disorder     Knee pain     Concussion     Milk protein intolerance     Headaches due to old head injury     Behcet's disease (H)     Migraine     Spell of shaking     On colchicine therapy     Palpitations     Fibromyalgia     Rheumatoid arthritis of multiple sites without rheumatoid factor (H)     Raynaud's disease without gangrene     Chronic abdominal pain     Patellofemoral instability     PTSD (post-traumatic stress disorder)     Cervical dystonia     Acute left ankle pain     Cervical pain     Major depressive disorder, recurrent episode, moderate (H)     Chronic pain syndrome     Convulsions, unspecified convulsion type (H)     Transient alteration of awareness     Vitamin D deficiency     Mobile cecum     Spells of decreased attentiveness     Pelvic floor weakness     Somatic symptom disorder     Gastroparesis     GERD (gastroesophageal reflux disease)     Displacement of lumbar intervertebral disc without myelopathy     Intestinal malabsorption     Iron deficiency associated with nonfamilial restless legs syndrome     Enthesopathy of hip region     Tourette's syndrome     Cellulitis     Infection of joint of ankle (H)     Preeclampsia     Past Medical History:   Diagnosis Date     Anemia      Anxiety      Arthritis      Behcet's disease (H)      Cervical adenitis May 2010     Chronic abdominal pain      Constipation, chronic 1994     Fibromyalgia      Gastro-oesophageal reflux disease      Gastroparesis      Irregular heart beat     tachycardia, has had workup     Migraines      Neuromuscular disorder (H)     fibramyalgia     Palpitations      PONV (postoperative nausea and vomiting)      Seizure (H)       Seizures (H)     unknown etiology     Syncope      Tourette's        CC Referred Self, MD  No address on file on close of this encounter.

## 2023-05-12 NOTE — LETTER
5/12/2023       RE: Samara Oropeza  [address]  [facility] [address]     Dear Colleague,    Thank you for referring your patient, Samara Oropeza, to the [facility] DERMAT[facility] [place] [facility]. Please see a copy of my visit note below.    [facility] Dermatology Note  Encounter Date: May 12, 2023  MyChart Connected.    Dermatology Problem List:  1. Behcet's syndrome   - Diagnosed 2014, primarily managed by rheumatology  - Periodic painful oral/genital (scarring) ulcers, folliculitis, hx positive pathergy test, prior success with apremilast (holding for pregnancy)  - Current: apremilast 30 mg BID per rheum, mometasone ointment, fluocinonide gel  - Prior: colchicine (holding for pregnancy), adalimumab (ineffective), triamcinolone paste for oral lesions, triamcinolone 0.1% oint BID PRN for genital/body lesions  - Future: certolizumab       ____________________________________________    Assessment & Plan:     1. Behcet's: having some oral and genital involvement but good cutaneous response to topical mometasone and also on apremilast. Currently breastfeeding. We discussed use of fluocinonide gel for mucosal surfaces and can continue rest of regimen. For postinflammatory hyperpigmentation, we discussed treatment with hydroquinone but defer for now since breastfeeding and areas of involvement are on chest, etc.  - apremilast 30 mg BID  - topicals    Procedures Performed:    None    Follow-up: 3-6 months    Staff:     Cornell Tracey MD, FAAD   of Dermatology  Department of Dermatology  [facility]    ____________________________________________    CC: Telephone (Behcet follow up)    HPI:  Ms. Samara Oropeza is a(n) 27 year old female who presents today as a return patient for Behcet's disease    Behcet - overall doing well  - topicals  have been very helpful - having lots of residual scarring - primarily chest, back, arms, stomach  - on apremilast  - currently breastfeeding  - had recent more severe genital ulcer - went to gynecology  - mouth is okay - a few active spots on the roof of the mouth    Patient is otherwise feeling well, without additional skin concerns.    Labs Reviewed:  N/A    Physical Exam:  Vitals: There were no vitals taken for this visit.  SKIN: video images were reviewed; image quality and interpretability: acceptable. Image date: n/a.  - hyperpigmented macules on the face and upper chest  - No other lesions of concern on areas examined.     Medications:  Current Outpatient Medications   Medication    acetaminophen (TYLENOL) 325 MG tablet    albuterol (PROAIR HFA/PROVENTIL HFA/VENTOLIN HFA) 108 (90 Base) MCG/ACT inhaler    amitriptyline (ELAVIL) 25 MG tablet    apremilast (OTEZLA) 30 MG tablet    artificial tears OINT ophthalmic ointment    benzocaine (TOPICALE XTRA) 20 % GEL    betamethasone valerate (VALISONE) 0.1 % cream    bisacodyl (DULCOLAX) 5 MG EC tablet    citalopram (CELEXA) 20 MG tablet    EPINEPHrine (EPIPEN 2-EAMON) 0.3 MG/0.3ML injection    fluocinonide (LIDEX) 0.05 % external gel    gabapentin (NEURONTIN) 300 MG capsule    hydrocortisone, Perianal, (ANUSOL-HC) 2.5 % cream    hypromellose (GENTEAL) 0.3 % SOLN    lanolin ointment    lidocaine (LMX4) 4 % external cream    LINZESS 290 MCG capsule    mometasone (ELOCON) 0.1 % external ointment    polyethylene glycol (MIRALAX/GLYCOLAX) powder    Prenatal MV-Min-Fe Fum-FA-DHA (PRENATAL 1 PO)    sodium fluoride 1.1 % CREA    triamcinolone (KENALOG) 0.1 % external ointment    benzocaine (AMERICAINE) 20 % external aerosol    docusate sodium (COLACE) 100 MG capsule    enoxaparin ANTICOAGULANT (LOVENOX) 40 MG/0.4ML syringe    enoxaparin ANTICOAGULANT (LOVENOX) 40 MG/0.4ML syringe    ibuprofen (ADVIL/MOTRIN) 800 MG tablet    lactulose 20 GM/30ML SOLN    NIFEdipine ER OSMOTIC  (ADALAT CC) 60 MG 24 hr tablet    NIFEdipine ER OSMOTIC (PROCARDIA XL) 30 MG 24 hr tablet    order for DME    order for DME    sucralfate (CARAFATE) 1 GM/10ML suspension     Current Facility-Administered Medications   Medication    botulinum toxin type A (BOTOX) 100 units injection 200 Units    triamcinolone (KENALOG-40) injection 40 mg    triamcinolone (KENALOG-40) injection 40 mg    triamcinolone (KENALOG-40) injection 40 mg      Past Medical/Surgical History:   Patient Active Problem List   Diagnosis    Tics - Tourette syndrome    IBS (irritable bowel syndrome)    Allergic rhinitis    Generalized anxiety disorder    Knee pain    Concussion    Milk protein intolerance    Headaches due to old head injury    Behcet's disease (H)    Migraine    Spell of shaking    On colchicine therapy    Palpitations    Fibromyalgia    Rheumatoid arthritis of multiple sites without rheumatoid factor (H)    Raynaud's disease without gangrene    Chronic abdominal pain    Patellofemoral instability    PTSD (post-traumatic stress disorder)    Cervical dystonia    Acute left ankle pain    Cervical pain    Major depressive disorder, recurrent episode, moderate (H)    Chronic pain syndrome    Convulsions, unspecified convulsion type (H)    Transient alteration of awareness    Vitamin D deficiency    Mobile cecum    Spells of decreased attentiveness    Pelvic floor weakness    Somatic symptom disorder    Gastroparesis    GERD (gastroesophageal reflux disease)    Displacement of lumbar intervertebral disc without myelopathy    Intestinal malabsorption    Iron deficiency associated with nonfamilial restless legs syndrome    Enthesopathy of hip region    Tourette's syndrome    Cellulitis    Infection of joint of ankle (H)    Preeclampsia     Past Medical History:   Diagnosis Date    Anemia     Anxiety     Arthritis     Behcet's disease (H)     Cervical adenitis May 2010    Chronic abdominal pain     Constipation, chronic 1994    Fibromyalgia      Gastro-oesophageal reflux disease     Gastroparesis     Irregular heart beat     tachycardia, has had workup    Migraines     Neuromuscular disorder (H)     fibramyalgia    Palpitations     PONV (postoperative nausea and vomiting)     Seizure (H)     Seizures (H)     unknown etiology    Syncope     Tourette's        CC Referred Self, MD  No address on file on close of this encounter.

## 2023-05-12 NOTE — PATIENT INSTRUCTIONS
Mometasone ointment - use for sores on skin    Fluocinonide gel - use for sores in mouth or genitals        After you are done breastfeeding, we can look at starting hydroquinone to lighten some areas of discoloration related to old inflammation

## 2023-05-12 NOTE — NURSING NOTE
Teledermatology Nurse Call Patients:     Are you in the [facility] at the time of the encounter? yes    Today's visit will be billed to you and your insurance.    A teledermatology visit is not as thorough as an in-person visit and the quality of the photograph sent may not be of the same quality as that taken by the dermatology clinic.    Chief Complaint   Patient presents with     Telephone     Behcet follow up     PT states she is taking Lorazepam-1 mg as needed.     Maeve Zimmer on 5/12/2023 at 11:06 AM

## 2023-05-22 NOTE — TELEPHONE ENCOUNTER
Called pt, asked for her step dad, she states that she doesn't live with them anymore. She will get a message to him to call us. Leydi Vega LPN    
Father faxed in [place] forms to be filled [place], so he can continue to take daughter to her appointments.   Needs to be filled out and back to them by 10-6-2017.  Father is Elvis Satrita. Form on Dr. Jimmy hi in [place].  Leydi Vega LPN    
I understand that Samara's parents are actively involved in her care. But her father's presence for the appointments are not medically necessary as Samara is now old enough to go on her own for her appointments. Please find out if there is any other reason why father has to take time off from work for Samara's appointments.   Thanks  Austen Marquez MD     
Kishor called back re: [employer] paper work. Call on his cell phone [phone] per phone [place].    L.M. For Kishor to call us. Leydi Vega LPN    See message below  
Kishor states that  He has to take time off work to bring shannan to her apt. Due to she has hx of seizures and wont be able to get a drivers license. She has applied for disability and has not gone through.   I asked if this is something her PCP should fill out and he said that she does not have one right now, and that Dr. Marquez filled it out last year. Leydi Vega LPN    see phone message dated 9/19/16 and 9/28/16. Leydi Vega LPN       
NEWTON Castrejon pt to call us. Leydi Vega LPN    
NEWTON For pt step dad to call us. Leydi Vega LPN    
Paperwork completed. Next time PCP may be asked first regarding this form.   Patient needs to establish care with PCP.   
Pt was seen and told me that her step dad does not need the form and that I should shred the form. Form shredded. Leydi Vega LPN    
Tried to leave message and mailbox is full. Leydi Vega LPN    
We have not heard back from patient, I will save form in rheumatology cupboard in [place] . Leydi Vega LPN    
We need to check with pt and see if she wants her dad at her appointments. If she does not then we need to tell dad that form can not be completed. If she does want dad there the form is at [facility]. Leydi Vega LPN    
Detail Level: Detailed
Show Follow-Up Variable: Yes
Detail Level: Zone

## 2023-05-30 ENCOUNTER — OFFICE VISIT (OUTPATIENT)
Dept: PHYSICAL MEDICINE AND REHAB | Facility: CLINIC | Age: 28
End: 2023-05-30
Payer: COMMERCIAL

## 2023-05-30 VITALS — DIASTOLIC BLOOD PRESSURE: 75 MMHG | SYSTOLIC BLOOD PRESSURE: 110 MMHG | HEART RATE: 106 BPM

## 2023-05-30 DIAGNOSIS — G43.719 CHRONIC MIGRAINE WITHOUT AURA, INTRACTABLE, WITHOUT STATUS MIGRAINOSUS: Primary | ICD-10-CM

## 2023-05-30 DIAGNOSIS — M54.81 BILATERAL OCCIPITAL NEURALGIA: ICD-10-CM

## 2023-05-30 PROCEDURE — 64405 NJX AA&/STRD GR OCPL NRV: CPT | Mod: XS | Performed by: PHYSICAL MEDICINE & REHABILITATION

## 2023-05-30 PROCEDURE — 64615 CHEMODENERV MUSC MIGRAINE: CPT | Performed by: PHYSICAL MEDICINE & REHABILITATION

## 2023-05-30 PROCEDURE — 95874 GUIDE NERV DESTR NEEDLE EMG: CPT | Performed by: PHYSICAL MEDICINE & REHABILITATION

## 2023-05-30 RX ORDER — METHYLPREDNISOLONE SODIUM SUCCINATE 40 MG/ML
40 INJECTION, POWDER, LYOPHILIZED, FOR SOLUTION INTRAMUSCULAR; INTRAVENOUS ONCE
Status: COMPLETED | OUTPATIENT
Start: 2023-05-30 | End: 2023-05-30

## 2023-05-30 RX ADMIN — METHYLPREDNISOLONE SODIUM SUCCINATE 40 MG: 40 INJECTION, POWDER, LYOPHILIZED, FOR SOLUTION INTRAMUSCULAR; INTRAVENOUS at 15:26

## 2023-05-30 NOTE — LETTER
5/30/2023         RE: Samara Oropeza  [address]  [place] [facility] [id number]        Dear Colleague,    Thank you for referring your patient, Samara Oropeza, to the [facility]. Please see a copy of my visit note below.      [facility]    PM&R CLINIC NOTE  BOTULINUM TOXIN PROCEDURE      HPI  No chief complaint on file.    Samara Oropeza is a 27 year old female who presents to clinic for botulinum toxin injections for management of chronic migraine headaches.     SINCE LAST VISIT  Samara Oropeza was last seen here in clinic on 2/28/2023, at which time she received 200 units of Botox.     Patient denies new medical diagnoses, illnesses, hospitalizations, emergency room visits, and injuries since the previous injection with botulinum neurotoxin.    RESPONSE TO PREVIOUS TREATMENT    Side effects: No problems reported    1.  Headache frequency during this injection cycle: 0-4 migraine headache days per week when the Botox is in effect. This is compared to her baseline headache frequency of 30 headache days per month.     2.  Headache duration during this injection cycle:  Headache duration was usually one day. Patient reports a few episodes of multiple day headaches during this injection cycle, particularly as the Botox was wearing off.     3.  Headache intensity during this injection cycle:    A.  7/10  =  Typical pain level.  B.  7/10  =  Worst pain level.    C.  0/10  =  Lowest pain level.    4.  Change in headache medication usage during this injection cycle:  (For Example:  Able to decrease use of oral pain medications.) She has been taking Tylenol and ibuprofen as needed for her migraine headaches.     5.  ER Visits During This Injection Cycle:  None.     6.  Functional Performance:  Change in ADL's, social interaction, days lost from work, etc. Patient reports being able to more fully participate in social and  family activities and responsibilities as headache symptoms have improved      PHYSICAL EXAM  VS: There were no vitals taken for this visit.   GEN: Pleasant and cooperative, in no acute distress  HEENT: No facial asymmetry    ALLERGIES  Allergies   Allergen Reactions     Amoxil [Penicillins] Rash     Dad unsure of reaction.     Bee Venom Anaphylaxis     Bioflavonoids Anaphylaxis     Citrus Anaphylaxis     All O'Neill     Contrast Dye Rash     [place] [facility] [place] [facility] [place]  NOTE: this is a contrast media oral with iodine. Premedicate with methylpred standard for IV contrast, request barium contrast for oral contrast.     Iodinated Contrast Media Hives and Rash     Contrast Media [place] [facility] [place] [facility] [place] Contrast Media [place] [facility] [place] [facility] [place].  NOTE: this is a contrast media oral with iodine. Premedicate with methylpred standard for IV contrast, request barium contrast for oral contrast.     Pineapple Anaphylaxis, Difficulty breathing and Rash     Reglan [Metoclopramide] Other (See Comments)     IV dose only, in ER, rapid heart rate.     Ace Inhibitors      Difficulty in breathing and GI upset     Amitiza [Lubiprostone] Nausea and Vomiting     Amoxicillin-Pot Clavulanate      Midazolam Unknown     parent states that when pt takes this medication, she wakes up being very violent .     Midazolam Hcl      Coming out of pelvic exam at age of 5, was kicking and screaming when coming out of the versed.     Other [No Clinical Screening - See Comments]      Bleech/ chest tightness, itchy throat, swollen tongue, hives     Tizanidine Other (See Comments)     Confusion, back pain, photophobia, abdominal pain, shaking, anxious       Adhesive Tape Rash     Azithromycin Hives and Rash     Cephalexin Itching and Rash     Sulfa Antibiotics Rash     Skin scarring       CURRENT MEDICATIONS    Current Outpatient Medications:      acetaminophen (TYLENOL) 325 MG tablet, Take  650 mg by mouth every 6 hours as needed for mild pain, Disp: , Rfl:      albuterol (PROAIR HFA/PROVENTIL HFA/VENTOLIN HFA) 108 (90 Base) MCG/ACT inhaler, Inhale 2 puffs into the lungs every 6 hours as needed for shortness of breath / dyspnea or wheezing, Disp: 1 Inhaler, Rfl: 1     amitriptyline (ELAVIL) 25 MG tablet, TAKE 1 TABLET BY MOUTH EVERY NIGHT AT BEDTIME, Disp: 90 tablet, Rfl: 1     apremilast (OTEZLA) 30 MG tablet, Take 1 tablet (30 mg) by mouth 2 times daily Hold for signs of infection, and seek medical attention., Disp: 60 tablet, Rfl: 5     artificial tears OINT ophthalmic ointment, 0.5 inch strip each eye at night, Disp: 1 Tube, Rfl: 11     benzocaine (AMERICAINE) 20 % external aerosol, Apply to perineum four times daily as needed for pain (Patient not taking: Reported on 5/12/2023), Disp: 57 g, Rfl: 3     benzocaine (TOPICALE XTRA) 20 % GEL, Apply as needed locally to mouth or nasal ulcers for pain; 4 times daily as needed, Disp: 30 g, Rfl: 1     betamethasone valerate (VALISONE) 0.1 % cream, Apply topically 2 times daily as needed , Disp: , Rfl:      bisacodyl (DULCOLAX) 5 MG EC tablet, Take 2 tablets (10 mg) by mouth daily as needed for constipation, Disp: 30 tablet, Rfl: 0     citalopram (CELEXA) 20 MG tablet, Take 1 tablet (20 mg) by mouth daily, Disp: 90 tablet, Rfl: 1     docusate sodium (COLACE) 100 MG capsule, Take 1 capsule (100 mg) by mouth daily (Patient not taking: Reported on 5/12/2023), Disp: 30 capsule, Rfl: 0     enoxaparin ANTICOAGULANT (LOVENOX) 40 MG/0.4ML syringe, Inject 0.4 mLs (40 mg) Subcutaneous every 24 hours (Patient not taking: Reported on 5/12/2023), Disp: 16 mL, Rfl: 1     enoxaparin ANTICOAGULANT (LOVENOX) 40 MG/0.4ML syringe, , Disp: , Rfl:      EPINEPHrine (EPIPEN 2-EAMON) 0.3 MG/0.3ML injection, Inject 0.3 mLs (0.3 mg) into the muscle as needed for anaphylaxis, Disp: 0.6 mL, Rfl: 3     fluocinonide (LIDEX) 0.05 % external gel, Apply topically 2 times daily, Disp: 60 g,  Rfl: 3     gabapentin (NEURONTIN) 300 MG capsule, Take 1 capsule (300 mg) by mouth every morning, Disp: 60 capsule, Rfl: 1     hydrocortisone, Perianal, (ANUSOL-HC) 2.5 % cream, Place rectally 3 times daily as needed for hemorrhoids, Disp: 30 g, Rfl: 0     hypromellose (GENTEAL) 0.3 % SOLN, 1 drop every hour as needed for dry eyes , Disp: , Rfl:      ibuprofen (ADVIL/MOTRIN) 800 MG tablet, Take 1 tablet (800 mg) by mouth every 6 hours as needed for other (cramping) (Patient not taking: Reported on 5/12/2023), Disp: 40 tablet, Rfl: 1     lactulose 20 GM/30ML SOLN, Take 30 mLs by mouth 3 times daily as needed (for constipation) (Patient not taking: Reported on 5/12/2023), Disp: 300 mL, Rfl: 3     lanolin ointment, Apply topically every hour as needed for other (sore nipples), Disp: 7 g, Rfl: 3     lidocaine (LMX4) 4 % external cream, Apply topically once as needed for mild pain, Disp: 120 g, Rfl: 1     LINZESS 290 MCG capsule, TAKE 1 CAPSULE BY MOUTH EVERY DAY IN THE MORNING BEFORE BREAKFAST, Disp: 90 capsule, Rfl: 0     mometasone (ELOCON) 0.1 % external ointment, Apply topically 2 times daily, Disp: 45 g, Rfl: 3     NIFEdipine ER OSMOTIC (ADALAT CC) 60 MG 24 hr tablet, Take 1 tablet (60 mg) by mouth every 12 hours (Patient not taking: Reported on 5/12/2023), Disp: 90 tablet, Rfl: 1     NIFEdipine ER OSMOTIC (PROCARDIA XL) 30 MG 24 hr tablet, Take 2 tablets (60 mg) by mouth daily (Patient not taking: Reported on 2/28/2023), Disp: 60 tablet, Rfl: 1     order for DME, Equipment being ordered: Trilok brace 8 1/2 left lower extremity (Patient not taking: Reported on 5/12/2023), Disp: 1 Device, Rfl: 0     order for DME, Equipment being ordered: size 8 1/2 walking boot tall (Patient not taking: Reported on 5/12/2023), Disp: 1 Device, Rfl: 0     polyethylene glycol (MIRALAX/GLYCOLAX) powder, Take 1 capful by mouth 3 times daily, Disp: , Rfl:      Prenatal MV-Min-Fe Fum-FA-DHA (PRENATAL 1 PO), , Disp: , Rfl:      sodium  fluoride 1.1 % CREA, Apply 1 Application topically daily, Disp: , Rfl:      sucralfate (CARAFATE) 1 GM/10ML suspension, Take 10 mLs (1 g) by mouth 4 times daily (Patient not taking: Reported on 2023), Disp: 1200 mL, Rfl: 2     triamcinolone (KENALOG) 0.1 % external ointment, Apply twice daily as needed to lesions on the genitals and body. OK to use very sparingly on the face., Disp: 454 g, Rfl: 2    Current Facility-Administered Medications:      botulinum toxin type A (BOTOX) 100 units injection 200 Units, 200 Units, Intramuscular, Q90 Days, Alejandrina Hernandez MD, 200 Units at 23 1249     triamcinolone (KENALOG-40) injection 40 mg, 40 mg, , , Fleischmann, Andres, DPM, 40 mg at 10/11/21 0928     triamcinolone (KENALOG-40) injection 40 mg, 40 mg, , , Fleischmann, Andres, DPM, 40 mg at 06/15/21 0957     triamcinolone (KENALOG-40) injection 40 mg, 40 mg, , , Fleischmann, Andres, DPM, 40 mg at 09/15/20 1554       BOTULINUM NEUROTOXIN INJECTION PROCEDURES    VERIFICATION OF PATIENT IDENTIFICATION AND PROCEDURE     Initials   Patient Name SES   Patient  SES   Procedure Verified by: SES     Prior to the start of the procedure and with procedural staff participation, I verbally confirmed the patient s identity using two indicators, relevant allergies, that the procedure was appropriate and matched the consent or emergent situation, and that the correct equipment/implants were available. Immediately prior to starting the procedure I conducted the Time Out with the procedural staff and re-confirmed the patient s name, procedure, and site/side. (The Joint Commission universal protocol was followed.)  Yes    Sedation (Moderate or Deep): None    ABOVE ASSESSMENTS PERFORMED BY    Alejandrina Hernandez MD      INDICATIONS FOR PROCEDURES  Samara Oropeza is a 27 year old patient with pain secondary to the diagnosis of chronic migraine headaches. Her baseline symptoms have been recalcitrant to oral medications  and conservative therapy.  She is here today for reinjection with Botox.    GOAL OF PROCEDURE  The goal of this procedure is to decrease pain.      TOTAL DOSE ADMINISTERED  Dose Administered:  200 units  Botox (Botulinum Toxin Type A)       2:1 Dilution   Unavoidable Drug Waste: No.  Diluent Used:  Preservative Free Normal Saline  Total Volume of Diluent Used:  4 ml  NDC #: Botox 100u ([address]-03)      CONSENT  The risks, benefits, and treatment options were discussed with Samara Oropeza and she agreed to proceed.    Written consent was obtained by [facility].     EQUIPMENT USED  Needle-25mm stimulating/recording  Needle-30 gauge  EMG/NCS Machine    SKIN PREPARATION  Skin preparation was performed using an alcohol wipe.    GUIDANCE DESCRIPTION  Electro-myographic guidance was necessary throughout the neck portion of the procedure to accurately identify all areas of spastic muscles while avoiding injection of non-spastic muscles, neighboring nerves and nearby vascular structures.     AREA/MUSCLE INJECTED:  200 UNITS BOTOX = TOTAL DOSE     1 & 2. SHOULDER GIRDLE & NECK MUSCLES: 60 units Botox = Total Dose, 2:1 Dilution      Right Splenius Cervicis - 5 units of Botox over 2 site/s.   Left Splenius Cervicis - 5 units of Botox over 2 site/s.     Right Rectus Capitis - 2.5 units of Botox at 1 site.  Left Rectus Capitis - 2.5 units of Botox at 1site.     Right Levator Scapulae - 10 units of Botox over 2 site/s.   Left Levator Scapulae - 10 units of Botox over 2 site/s.    Right Anterior Scalene - 2.5 units of Botox over 1 site/s.   Left Anterior Scalene - 2.5 units of Botox over 1 site/s.     Right Sternocleidomastoid - 2.5 units of Botox over 1 site/s.   Left Sternocleidomastoid - 2.5 units of Botox over 1 site/s.     Right Rhomboid Major - 5 units of Botox over 1 site/s.    Left Rhomboid Major - 5 units of Botox over 1 site/s.      Right Pectoralis Minor - 2.5 units of Botox over 1 site/s.    Left Pectoralis Minor - 2.5  units of Botox over 1 site/s.     3. HEAD & SCALP MUSCLES: 140 units Botox = Total Dose, 2:1 Dilution     Right Occipitalis - 5 units of Botox over 1 site/s.   Left Occipitalis - 5 units of Botox over 1 site/s.     Right Frontalis - 10 units of Botox over 2 site/s.  Left Frontalis - 10 units of Botox over 2 site/s.     Right Temporalis - 50 units of Botox over 8 site/s.  Left Temporalis - 50 units of Botox over 8 site/s.     Right  - 2.5 units of Botox over 1 site/s.              Left  - 2.5 units of Botox over 1 site/s.                 Procerus - 5 units of Botox at 1 site/s.      BILATERAL GREATER OCCIPITAL NERVE BLOCKS:  Dru% lidocaine: 9 ml  Methylprednisolone: 40 mg = 1 ml       Area just inferior to insertion of the right and left superior trapezius insertion onto skull was cleansed with ChloraPrep. Needle was advanced anteriorly to base of skull then slightly withdrawn and injectate was injected in a fan-like distribution at different depths. A 10 ml mixture of 1% lidocaine, and methylprednisolone was divided into two 5 ml syringes. Total injection volume = 5 ml per side.       RESPONSE TO PROCEDURE  Samara Oropeza tolerated the procedure well and there were no immediate complications. She was allowed to recover for an appropriate period of time and was discharged home in stable condition.    ASSESSMENT AND PLAN   1. Botulinum toxin injections: No changes made to Botox dose or distribution today. Occipital nerve blocks also administered per patient request. Patient will continue to monitor response to Botox and report at next appointment.   2. Referrals: None.   3. Follow up: Samara Oropeza was rescheduled for the next series of injections in 12 weeks, at which time we will evaluate response to today's injections. she may call the clinic prior with any questions or concerns prior to the next appointment.       Again, thank you for allowing me to participate in the care of  your patient.        Sincerely,        Alejandrina Hernandez MD

## 2023-05-30 NOTE — PROGRESS NOTES
[facility]    PM&R CLINIC NOTE  BOTULINUM TOXIN PROCEDURE      HPI  No chief complaint on file.    Samara Oropeza is a 27 year old female who presents to clinic for botulinum toxin injections for management of chronic migraine headaches.     SINCE LAST VISIT  Samara Oropeza was last seen here in clinic on 2/28/2023, at which time she received 200 units of Botox.     Patient denies new medical diagnoses, illnesses, hospitalizations, emergency room visits, and injuries since the previous injection with botulinum neurotoxin.    RESPONSE TO PREVIOUS TREATMENT    Side effects: No problems reported    1.  Headache frequency during this injection cycle: 0-4 migraine headache days per week when the Botox is in effect. This is compared to her baseline headache frequency of 30 headache days per month.     2.  Headache duration during this injection cycle:  Headache duration was usually one day. Patient reports a few episodes of multiple day headaches during this injection cycle, particularly as the Botox was wearing off.     3.  Headache intensity during this injection cycle:    A.  7/10  =  Typical pain level.  B.  7/10  =  Worst pain level.    C.  0/10  =  Lowest pain level.    4.  Change in headache medication usage during this injection cycle:  (For Example:  Able to decrease use of oral pain medications.) She has been taking Tylenol and ibuprofen as needed for her migraine headaches.     5.  ER Visits During This Injection Cycle:  None.     6.  Functional Performance:  Change in ADL's, social interaction, days lost from work, etc. Patient reports being able to more fully participate in social and family activities and responsibilities as headache symptoms have improved      PHYSICAL EXAM  VS: There were no vitals taken for this visit.   GEN: Pleasant and cooperative, in no acute distress  HEENT: No facial asymmetry    ALLERGIES  Allergies   Allergen Reactions     Amoxil [Penicillins]  Rash     Dad unsure of reaction.     Bee Venom Anaphylaxis     Bioflavonoids Anaphylaxis     Citrus Anaphylaxis     All Osakis     Contrast Dye Rash     Contrast Media Ready-Box [facility], 04/09/2014.; Contrast Media Ready-Box [facility], 04/09/2014.  NOTE: this is a contrast media oral with iodine. Premedicate with methylpred standard for IV contrast, request barium contrast for oral contrast.     Iodinated Contrast Media Hives and Rash     Contrast Media Ready-Box [facility], 04/09/2014.; Contrast Media Ready-Box [facility], 04/09/2014.  NOTE: this is a contrast media oral with iodine. Premedicate with methylpred standard for IV contrast, request barium contrast for oral contrast.     Pineapple Anaphylaxis, Difficulty breathing and Rash     Reglan [Metoclopramide] Other (See Comments)     IV dose only, in ER, rapid heart rate.     Ace Inhibitors      Difficulty in breathing and GI upset     Amitiza [Lubiprostone] Nausea and Vomiting     Amoxicillin-Pot Clavulanate      Midazolam Unknown     parent states that when pt takes this medication, she wakes up being very violent .     Midazolam Hcl      Coming out of pelvic exam at age of 5, was kicking and screaming when coming out of the versed.     Other [No Clinical Screening - See Comments]      Bleech/ chest tightness, itchy throat, swollen tongue, hives     Tizanidine Other (See Comments)     Confusion, back pain, photophobia, abdominal pain, shaking, anxious       Adhesive Tape Rash     Azithromycin Hives and Rash     Cephalexin Itching and Rash     Sulfa Antibiotics Rash     Skin scarring       CURRENT MEDICATIONS    Current Outpatient Medications:      acetaminophen (TYLENOL) 325 MG tablet, Take 650 mg by mouth every 6 hours as needed for mild pain, Disp: , Rfl:      albuterol (PROAIR HFA/PROVENTIL HFA/VENTOLIN HFA) 108 (90 Base) MCG/ACT inhaler, Inhale 2 puffs into the lungs every 6 hours as needed for shortness of breath / dyspnea or wheezing, Disp: 1 Inhaler, Rfl: 1      amitriptyline (ELAVIL) 25 MG tablet, TAKE 1 TABLET BY MOUTH EVERY NIGHT AT BEDTIME, Disp: 90 tablet, Rfl: 1     apremilast (OTEZLA) 30 MG tablet, Take 1 tablet (30 mg) by mouth 2 times daily Hold for signs of infection, and seek medical attention., Disp: 60 tablet, Rfl: 5     artificial tears OINT ophthalmic ointment, 0.5 inch strip each eye at night, Disp: 1 Tube, Rfl: 11     benzocaine (AMERICAINE) 20 % external aerosol, Apply to perineum four times daily as needed for pain (Patient not taking: Reported on 5/12/2023), Disp: 57 g, Rfl: 3     benzocaine (TOPICALE XTRA) 20 % GEL, Apply as needed locally to mouth or nasal ulcers for pain; 4 times daily as needed, Disp: 30 g, Rfl: 1     betamethasone valerate (VALISONE) 0.1 % cream, Apply topically 2 times daily as needed , Disp: , Rfl:      bisacodyl (DULCOLAX) 5 MG EC tablet, Take 2 tablets (10 mg) by mouth daily as needed for constipation, Disp: 30 tablet, Rfl: 0     citalopram (CELEXA) 20 MG tablet, Take 1 tablet (20 mg) by mouth daily, Disp: 90 tablet, Rfl: 1     docusate sodium (COLACE) 100 MG capsule, Take 1 capsule (100 mg) by mouth daily (Patient not taking: Reported on 5/12/2023), Disp: 30 capsule, Rfl: 0     enoxaparin ANTICOAGULANT (LOVENOX) 40 MG/0.4ML syringe, Inject 0.4 mLs (40 mg) Subcutaneous every 24 hours (Patient not taking: Reported on 5/12/2023), Disp: 16 mL, Rfl: 1     enoxaparin ANTICOAGULANT (LOVENOX) 40 MG/0.4ML syringe, , Disp: , Rfl:      EPINEPHrine (EPIPEN 2-EAMON) 0.3 MG/0.3ML injection, Inject 0.3 mLs (0.3 mg) into the muscle as needed for anaphylaxis, Disp: 0.6 mL, Rfl: 3     fluocinonide (LIDEX) 0.05 % external gel, Apply topically 2 times daily, Disp: 60 g, Rfl: 3     gabapentin (NEURONTIN) 300 MG capsule, Take 1 capsule (300 mg) by mouth every morning, Disp: 60 capsule, Rfl: 1     hydrocortisone, Perianal, (ANUSOL-HC) 2.5 % cream, Place rectally 3 times daily as needed for hemorrhoids, Disp: 30 g, Rfl: 0     hypromellose (GENTEAL) 0.3  % SOLN, 1 drop every hour as needed for dry eyes , Disp: , Rfl:      ibuprofen (ADVIL/MOTRIN) 800 MG tablet, Take 1 tablet (800 mg) by mouth every 6 hours as needed for other (cramping) (Patient not taking: Reported on 5/12/2023), Disp: 40 tablet, Rfl: 1     lactulose 20 GM/30ML SOLN, Take 30 mLs by mouth 3 times daily as needed (for constipation) (Patient not taking: Reported on 5/12/2023), Disp: 300 mL, Rfl: 3     lanolin ointment, Apply topically every hour as needed for other (sore nipples), Disp: 7 g, Rfl: 3     lidocaine (LMX4) 4 % external cream, Apply topically once as needed for mild pain, Disp: 120 g, Rfl: 1     LINZESS 290 MCG capsule, TAKE 1 CAPSULE BY MOUTH EVERY DAY IN THE MORNING BEFORE BREAKFAST, Disp: 90 capsule, Rfl: 0     mometasone (ELOCON) 0.1 % external ointment, Apply topically 2 times daily, Disp: 45 g, Rfl: 3     NIFEdipine ER OSMOTIC (ADALAT CC) 60 MG 24 hr tablet, Take 1 tablet (60 mg) by mouth every 12 hours (Patient not taking: Reported on 5/12/2023), Disp: 90 tablet, Rfl: 1     NIFEdipine ER OSMOTIC (PROCARDIA XL) 30 MG 24 hr tablet, Take 2 tablets (60 mg) by mouth daily (Patient not taking: Reported on 2/28/2023), Disp: 60 tablet, Rfl: 1     order for DME, Equipment being ordered: Trilok brace 8 1/2 left lower extremity (Patient not taking: Reported on 5/12/2023), Disp: 1 Device, Rfl: 0     order for DME, Equipment being ordered: size 8 1/2 walking boot tall (Patient not taking: Reported on 5/12/2023), Disp: 1 Device, Rfl: 0     polyethylene glycol (MIRALAX/GLYCOLAX) powder, Take 1 capful by mouth 3 times daily, Disp: , Rfl:      Prenatal MV-Min-Fe Fum-FA-DHA (PRENATAL 1 PO), , Disp: , Rfl:      sodium fluoride 1.1 % CREA, Apply 1 Application topically daily, Disp: , Rfl:      sucralfate (CARAFATE) 1 GM/10ML suspension, Take 10 mLs (1 g) by mouth 4 times daily (Patient not taking: Reported on 5/12/2023), Disp: 1200 mL, Rfl: 2     triamcinolone (KENALOG) 0.1 % external ointment, Apply  twice daily as needed to lesions on the genitals and body. OK to use very sparingly on the face., Disp: 454 g, Rfl: 2    Current Facility-Administered Medications:      botulinum toxin type A (BOTOX) 100 units injection 200 Units, 200 Units, Intramuscular, Q90 Days, Alejandrina Hernandez MD, 200 Units at 23 1249     triamcinolone (KENALOG-40) injection 40 mg, 40 mg, , , Fleischmann, Andres, DPM, 40 mg at 10/11/21 0928     triamcinolone (KENALOG-40) injection 40 mg, 40 mg, , , Fleischmann, Andres, DPM, 40 mg at 06/15/21 0957     triamcinolone (KENALOG-40) injection 40 mg, 40 mg, , , Fleischmann, Andres, DPM, 40 mg at 09/15/20 1554       BOTULINUM NEUROTOXIN INJECTION PROCEDURES    VERIFICATION OF PATIENT IDENTIFICATION AND PROCEDURE     Initials   Patient Name SES   Patient  SES   Procedure Verified by: SES     Prior to the start of the procedure and with procedural staff participation, I verbally confirmed the patient s identity using two indicators, relevant allergies, that the procedure was appropriate and matched the consent or emergent situation, and that the correct equipment/implants were available. Immediately prior to starting the procedure I conducted the Time Out with the procedural staff and re-confirmed the patient s name, procedure, and site/side. (The Joint Commission universal protocol was followed.)  Yes    Sedation (Moderate or Deep): None    ABOVE ASSESSMENTS PERFORMED BY    Alejandrina Hernandez MD      INDICATIONS FOR PROCEDURES  Samara rOopeza is a 27 year old patient with pain secondary to the diagnosis of chronic migraine headaches. Her baseline symptoms have been recalcitrant to oral medications and conservative therapy.  She is here today for reinjection with Botox.    GOAL OF PROCEDURE  The goal of this procedure is to decrease pain.      TOTAL DOSE ADMINISTERED  Dose Administered:  200 units  Botox (Botulinum Toxin Type A)       2:1 Dilution   Unavoidable Drug Waste:  No.  Diluent Used:  Preservative Free Normal Saline  Total Volume of Diluent Used:  4 ml  NDC #: Botox 100u ([address]-20)      CONSENT  The risks, benefits, and treatment options were discussed with Samara Robersonon and she agreed to proceed.    Written consent was obtained by [facility].     EQUIPMENT USED  Needle-25mm stimulating/recording  Needle-30 gauge  EMG/NCS Machine    SKIN PREPARATION  Skin preparation was performed using an alcohol wipe.    GUIDANCE DESCRIPTION  Electro-myographic guidance was necessary throughout the neck portion of the procedure to accurately identify all areas of spastic muscles while avoiding injection of non-spastic muscles, neighboring nerves and nearby vascular structures.     AREA/MUSCLE INJECTED:  200 UNITS BOTOX = TOTAL DOSE     1 & 2. SHOULDER GIRDLE & NECK MUSCLES: 60 units Botox = Total Dose, 2:1 Dilution      Right Splenius Cervicis - 5 units of Botox over 2 site/s.   Left Splenius Cervicis - 5 units of Botox over 2 site/s.     Right Rectus Capitis - 2.5 units of Botox at 1 site.  Left Rectus Capitis - 2.5 units of Botox at 1site.     Right Levator Scapulae - 10 units of Botox over 2 site/s.   Left Levator Scapulae - 10 units of Botox over 2 site/s.    Right Anterior Scalene - 2.5 units of Botox over 1 site/s.   Left Anterior Scalene - 2.5 units of Botox over 1 site/s.     Right Sternocleidomastoid - 2.5 units of Botox over 1 site/s.   Left Sternocleidomastoid - 2.5 units of Botox over 1 site/s.     Right Rhomboid Major - 5 units of Botox over 1 site/s.    Left Rhomboid Major - 5 units of Botox over 1 site/s.      Right Pectoralis Minor - 2.5 units of Botox over 1 site/s.    Left Pectoralis Minor - 2.5 units of Botox over 1 site/s.     3. HEAD & SCALP MUSCLES: 140 units Botox = Total Dose, 2:1 Dilution     Right Occipitalis - 5 units of Botox over 1 site/s.   Left Occipitalis - 5 units of Botox over 1 site/s.     Right Frontalis - 10 units of Botox over 2 site/s.  Left  Frontalis - 10 units of Botox over 2 site/s.     Right Temporalis - 50 units of Botox over 8 site/s.  Left Temporalis - 50 units of Botox over 8 site/s.     Right  - 2.5 units of Botox over 1 site/s.              Left  - 2.5 units of Botox over 1 site/s.                 Procerus - 5 units of Botox at 1 site/s.      BILATERAL GREATER OCCIPITAL NERVE BLOCKS:  Dru% lidocaine: 9 ml  Methylprednisolone: 40 mg = 1 ml       Area just inferior to insertion of the right and left superior trapezius insertion onto skull was cleansed with ChloraPrep. Needle was advanced anteriorly to base of skull then slightly withdrawn and injectate was injected in a fan-like distribution at different depths. A 10 ml mixture of 1% lidocaine, and methylprednisolone was divided into two 5 ml syringes. Total injection volume = 5 ml per side.       RESPONSE TO PROCEDURE  Samara Oropeza tolerated the procedure well and there were no immediate complications. She was allowed to recover for an appropriate period of time and was discharged home in stable condition.    ASSESSMENT AND PLAN   1. Botulinum toxin injections: No changes made to Botox dose or distribution today. Occipital nerve blocks also administered per patient request. Patient will continue to monitor response to Botox and report at next appointment.   2. Referrals: None.   3. Follow up: Samara Oropeza was rescheduled for the next series of injections in 12 weeks, at which time we will evaluate response to today's injections. she may call the clinic prior with any questions or concerns prior to the next appointment.

## 2023-06-21 ENCOUNTER — VIRTUAL VISIT (OUTPATIENT)
Dept: RHEUMATOLOGY | Facility: CLINIC | Age: 28
End: 2023-06-21
Attending: INTERNAL MEDICINE
Payer: COMMERCIAL

## 2023-06-21 VITALS — WEIGHT: 154 LBS | BODY MASS INDEX: 27.28 KG/M2

## 2023-06-21 DIAGNOSIS — M35.2 BEHCET'S DISEASE (H): Primary | ICD-10-CM

## 2023-06-21 PROCEDURE — 99213 OFFICE O/P EST LOW 20 MIN: CPT | Mod: 93 | Performed by: INTERNAL MEDICINE

## 2023-06-21 ASSESSMENT — PAIN SCALES - GENERAL: PAINLEVEL: MODERATE PAIN (5)

## 2023-06-21 NOTE — PROGRESS NOTES
Virtual Visit Details    Type of service:  Telephone Visit   Phone call duration: 11 minutes       Rheumatology Clinic Return Visit Patient     Samara Oropeza MRN# [id number]   YOB: 1995 Age: 27 year old     Date of Visit: 2023   Last seen: 2023  Primary care provider: [facility]          Assessment & Plan    Assessment & Plan   Samara is a 27 year old  female (ADRIAN 21) with Behçet's disease (pathergy, oral/genital ulcers, polyarthralgia) who presents today for RECHECK  .    # Behçet's disease (pathergy, arthralgias, recurrent oral / genital ulcers)  # s/p delivery on 2023 with high-risk pregnancy, complicated by pre-eclampsia,  birth but healthy baby    10/2022:  Mrs. Oropeza arrives for follow-up of her Behçet's disease that is more active at present with recent decrease of her Otezla (60mg --> 30mg) recommended by [facility]. Previous discussion with her OB providers had been to decrease to the lowest effective dose, clearly 30mg is not effective as she describes worsened oral ulcers, genital ulcers, arthralgias, abdominal pain, and thrombophlebitis. Although there is not a great deal of evidence for Otezla safety in pregnancy, its mechanism of action would suggest it. Counterbalancing these concerns are what we understand of Behçet's in pregnancy which more often improves but in some cases (~29%) becomes worse, and can flare more often in the 1st trimester and post- period.  She also describes some vision changes, which in the context of Behçet's is conerning. She has not seen Ophthalmology in many years; I will place a referral today.     Today 2023: Stable today, on otezla 30 mg bid, breastfeeding, [facility] is ok with breastfeeding, discussed ACR reproductive guideline, it does not recommend otezla during pregnancy and breastfeeding given lack of data; however Samara remained on it during pregnancy and now breastfeeding as  stopping it leads to severe flare of her behcet and benefits of staying on it outweighs risks. Her MFM provider is aware of staying on otezla and is ok with it during breastfeeding.      Plan:      Continue otezla 30mg BID    Return in person in 6 months        Dominga Sosa MD              Medical History   History of Present Illness   Samara Oropeza is a 27 year old female who presents for follow-up of her Behçet's in the context of pregnancy.      Seen Dr. Marilyn Moran [facility]umatology in [facility] [place]:    Original presentation 2014:     Ms Oropeza is a 19 y.o. female who presents with one year of presentation of painful oral ulcers (monthly) once that have worsened with two episodes in the last two months that presented with painful vulvar or vaginal ulcers (2 episodes so far every month) [not sure if they leave scar] as well that timing coincides with menses ([facility] [place]: 8/25/14).   ORAL ULCERS: last two episodes were severe, painful, white base with erythematous halo, that appear and disappear in the matter of 1-2 weeks without, does not bleed and doesn't respond to any treatment used (magic mouthwash), 10-15 in number with the biggest one being dime size.      VAGINAL ULCERS: 2-3 in number between labia majora and minora that occur simultaneously with oral ulcers, painful, non bleeding ulcers that are erythematous, no pus.      FOLLICULITIS: patient reports having spots in legs specially around ankle and knee, but arms, and neck are affected as well. Small lesions that resemble ingrown hair, most are red erythematous elevated lesions, well demarcated, some with liquid that patient refers as pimple like.      SKIN RASHES: welts and red macules with well defined borders that are pruritic and non-ulcerative on chest, arms and back. Benadryl relieves.      ARTHRALGIAS: In descending order of severity: hips, knees, wrists, shoulders, neck, lumbar, fingers.   C/o morning stiffness in hips, back and neck lasting for about  until noon.      Ms. Oropeza reports they present in severe flares and never fully resolve, but do get better. She has seen some swelling and warmth on the affected joints but has not noticed and redness. Has tried Tylenol, ibuprofen, and hydrocodone with not much benefit.      FEVERS: Reports 3-4 sporadic episodes per month of self limited fevers of 38 C that occur during the evenings and associate facial flushing.      HEADACHES: occur daily and are bitemporal and irradiate occipitally, pulsatile in nature, intensity varies from intense to mild, are worsened with movement. On treatment with ibuprofen and somatriptan without any positive results.      VISION CHANGES: Patient reports that suddenly she would only see a gray blotch in her right eyes and would persist like this for a day and a half. Also reports blurriness in her left eye. Presence of pain and burning sensation of eyeball with associated redness. Has changed prescription glasses in the matter of 2 years 3 times. She has had opthalmology exam and was not suggestive of any evidence of uveitis.   She was also evaluated in ED for a dizzy spell.      ABD PAIN W/ INTERMITTENT DIARRHEA AND CONSTIPATION: Patient reports abdominal pain that is intermittent, periods of 7 days without bowel movement and feeling bloated with change of pattern to diarrhea (liquidy without blood nor mucus, non foul smelling). Has taken Bentyl, Zegrid, and rinetidine with mild clinical improvement.  She also reports small red nodules after each needle stick for blood draw.   Neurology saw her back then and was not impressed with her presentation as physical examination was normal. Also MRI brain normal: Findings: No definite hemorrhage, mass affect, midline shift, or ventriculomegaly is noted.There are a few scattered non specific white matter T2 hyperintensities. Axial diffusion weighted images are unremarkable.     The major vascular structures appear patent. There is opacification  and severe mucosal thickening in the right maxillary sinus. The remaining paranasal sinuses, and mastoid air cells are clear. Orbits are unremarkable  She has + HSV-1 IGG. Seen ID. Negative for Herpes simplex virus culture. HSV IGM I/II COMBINATION SENT TO [facility]  RESULTS = <0.91  REF RANGE: <0.91 = NEGATIVE  ID did not think HSV could explain her mouth and genital sores.      CRP within normal limits. HIV negative. CBC within normal limits; UA negative. Creatinine within normal limits   CYNDIE neg.  Antigliadin neg.   ANti TTG neg.   [facility] 2014: Colonoscopy and endoscopy neg; result not available. Not sure if biopsies were done.   Raynaud's phenomenon:  Onset: about 2013  Digits: all fingers  Digital ulcers: no  Color changes: white ---> purple and red  Duration of an attack: About couple of hours per mom  Pain during attack: not clear pain but bruise like feeling  Frequency of attacks: few times a month  Family history of Raynauds: no  GERD: very long time     No hemoptysis.   No miscarriages/ no thrombosis in past.   No family or personal history of psoriasis, ulcerative colitis or chron's disease.   No buttock pain or low back pain or stiffness in AM     Interim history:  Dec 2014- Multiple mouth ulcers and did not eat for 5 days. This coincided with periods. Colchicine 0.6mg PO BID started in Nov 2014.   Prednisone taper in Dec 20mg to 0 in 4 weeks. She also used magic mouthwash. Kenalog cream. After going to the ER, 3 days later her ulcers were better. She thinks it improved after the menstruation stopped.   LMP 3 weeks ago.   COLCHICINE HAS HELPED A LOT REDUCING THE INTENSITY OF MENSTRUATION ASSOCIATED ORAL AND VULVAR ULCERS.      Interim history: 6/15     Since last visit only two episodes of mouth sores- small sized 6   No genital ulcers since last visit.   Colchicine 1.2 mg in AM and 0.6mg in PM keeps her symptoms under control.      Admitted in 5/15:  Admission Diagnoses:  - LUE weakness  - spell  - hx of  "migraines  - hx of Tourette syndrome  - hx of Behcet's disease     Discharge Diagnoses:   - spell likely 2/2 anxiety  - hx of migraines  - hx of Tourette syndrome  - hx of Behcet's disease     CT and MRI brain was normal.      Seeing Dr. Lilliana Miramontes soon as outpatient.      Dr. Garcia did not find any intraocular inflammation.       Interm 10/30/2015  ED for migraine. Continues to see neuro - MRI brain without lesions noted. Saw GI - upper barium normal. May be considering repeat colo. Worsening lesions in mouth and genitals. Has had continuous lesion in mouth x 2 months. 2 genital ulcers. Had fracture of right sesamoid in foot. Continues to have low grade fevers. New folliculitis on chest, legs and arms.      Interim hx: 1/11/2016    Pt states that since last visit she continues to have oral ulcers and has noted more folliculitis-like lesions on her lower extremities.  She states that on her right lower leg she had a red macular spot that has since resolved.  She denies uveitis.  She states that the colchicine initially helped but then stopped working.  She takes 0.6 mg TID.  She stopped taking her prednisone last week as she feels like this hasn't helped.  She hasn't had any episodes of vaginal ulcers.      She saw GI who stated she has gastroparesis.  She is seeing Cardiology later this week for possible syncopal episodes.       Interim history 5/16  Mouth ulcers X 1 last month  Genital ulcers - none since last visit.      Bilateral MCPs pain, knee pain and low back pain +ve increased since last visit  Morning stiffness X 2 hours (increasing)   5-6/10     \"overall myalgia\" has gotten worse    C/o pseudofolliculitis lesion on anterior shins bilaterally worse     Interim history: (7/18/2016)  Patient has just started Humira for 2 doses on 7/1 and 7/15 and reported minimal improvement of her hip pain but otherwise, did not notice anything different.      She reported painful mouth ulcer once a month since " last visit but noticed that it has been getting deeper.   Denied any genital ulcers.     Still has ongoing bilateral MCPs pain, knee pain but this has been intermittent and she did not have any much pain today. She reported 2-3 hours morning stiffness of both hands and pain score of 6/10 at her knees and 8/10 of her hands but currently takes no pain medication except prn ativan which she takes when her muscle is really tight.      The only concern is that she gained weight even though she has not been eating much (eat once a day) and do exercises every day for 1 hour (with video of yoga, pilates). Her mother wonders if she needs to have some labs work up include sex hormone, thyroid, cortisol.     Interval history 9/14/16  Patient was started Humira at the last visit, patient reports worsening of skin ulcers since starting Humira and stopped taking Humura for the past 2 weeks. Patient reports oral and genital ulcers has been stable even before starting Humira and has not had any interval oral/genital ulcers. However she reports recurrent skin ulcers occurring on a daily basis that affects her chest and anterior legs. Patient also has stopped taking prednisone, she reports that she doesn't feel the prednisone helps, her last dose of prednisone was 6+ months ago. Patient also report worsening low back pain that sometimes causes her to limp.     In the interval patient also visited ED on 8/31/16 for left hand pain and what the patient describes as phlebitis, no medication or procedure was done and the patient was discharged with a splint. Patient also reported 1 episode of chest pressure that was associated with dyspnea and numbness of the left arm, she was shopping in a supermarket at the time of onset, and the pressure resolved after she took a nap.     Patient denies any infectious symptoms in the interval, no fever, chills, night sweat, cough, dysuria, denies eye pain or visual changes.     November 4, 2016  Oct -  had one genital ulcer  Oral ulcers X 5- around menstruation time.   Knee pain- chronic on the left side  Dizziness spells - on and off; been to the ER for that in the past.   On and off migraines  July 2013 - s/p MPFL reconstruction plus LRL 7/2013  Morning stiffness in knee (left) X 1 hour     Severe jaw pain and chest pain- patient wondering if this is Tourette's or esophageal spasms. Cardiac workup negative.   Fever      January 13, 2017  [place] [facility] [place] from dehydration.   Chest pain on and off still continues. Painful with deep breaths.   Oral ulcers - few minor ones lasting for one week;   One major one in roof of mouth lasting for about one week.   Knee pain +ve - reviewed the recent MRI with her orthopedic surgeon.   On and off migraines  otezla is approved. Still waiting to receive the meds.      March 31, 2017  Otezla current dose 15mg PO BID.   She is tolerating okay at this dose and is willing to increase the dose further up to 30mg BID.   Her joint pains are better on otezla. Knee pain is also better.   Back and neck pain +ve 8/10 when it is worse. Intramuscular botox injections were given on Monday in PMR.   2 minor genital ulcers in the interim- resolved.   Few oral ulcers in the interim- resolved.   Nasal ulcer - resolved.   Migraines on and off.   Nausea with otezla but improving.   Dizziness and blackouts on and off. She fell once and hurt her ankle. Wearing boot in left leg.   Complete ROS negative except for above     May 17, 2017  Tolerating otezla better. Not throwing up anymore. Current dose 20mg in AM and 30mg in PM (2nd week).   Patient thinks that her oral ulcers frequency and severity are improving with otezla. Also no genital ulcers in the interim.   Currently on doxycycline for bronchitis/?pneunomia. 4 more days left.      Joint pain history  2 weeks ago/ dx with pneumonia 1 week ago/  Involved joints: all over  Pain scale: 6.5/10   Wakes the patient from  sleep : Yes  Morning stiffness: Yes for 120 minutes  Meds used:otezla,colchicine     Interim history  Since last visit:  1. Infections - Yes/ pneumonia  2. New symptoms/medical problem - Yes/ thinks she may have had a mini-seizure about 10 days ago ; did not seek medical attention; did not reoccur after that. Patient seeing PCP today.  3. Any side effects from Rheum medications -vomiting a lot (resolved)  3. ER visits/Hospitalizations/surgeries - Yes  4. Last PCP visit: has an apt. today     Patient still getting double vision. Floaters present.      Therapist recommended psychiatry evaluation. Patient does not want to see psychaitry. Patient will see PCP today.      August 22, 2017  Have you ever seen a rheumatologist Yes Who You When 5/17/17     NO genital ulcers in the interim.   Mouth ulcers- three in the back of the throat and roof of the mouth last month.      Joint pain history  Onset: doing alittle worse / cut her otezla back to 30mg  Daily due to GI problems  Involved joints: all over her body. Been having more black out episodes, been having more chest pains.also has raised bumps on both legs from the knees down that itch and are painful   Pain scale:  5.5/10     Wakes the patient from sleep : Yes  Morning stiffness:Yes for 120 minutes  Meds used:otezla, colchicine (three pills daily)     Interim history  Since last visit:  1. Infections - Yes stomach issue  2. New symptoms/medical problem - No  3. Any side effects from Rheum medications -nausea from otezla  3. ER visits/Hospitalizations/surgeries - Yes, ER for foot, ankle injury from passing out and fell down the steps. Hit her head another time from passing out. Alcohol poisoning in July 4. Last PCP visit: 6/23/17 September 19, 2017  Have you ever seen a rheumatologist Yes Who You When 8/22/17  Joint pain history  Onset: pt states that she hurts everywhere for 6 days  Involved joints: all joints  Pain scale:  7/10     Wakes the patient from sleep :  Yes/ not sleeping at all  Morning stiffness:Yes for 180 minutes  Meds used:colchicine, otezla, magic mouth wash, lidocaine gel  Last one week: increased joint pain; and genital ulcer  Genital lesion (dimed size) in the last one week  After botox a week ago, she had fever 101 for three days; near syncopes. Back pain; floaters  Chest pain , mouth sores, hand pain, morning pain were all better with otezla 30mg twice daily.     Interim history  Since last visit:  1. Infections - No  2. New symptoms/medical problem - No  3. Any side effects from Rheum medications -nausea from the otezla  3. ER visits/Hospitalizations/surgeries - No  4. Last PCP visit: may 2017      October 18, 2017     Have you ever seen a rheumatologist Yes Who You When 9/19/17  Joint pain history  NO mouth or genital sores in the last 4 weeks.   Medrol dosepak helped with visual symptoms but not joint pains.      Onset: pt states that she is doing better than last time with her behcet's. Today has severe stomach pains, states that she is constipated. Pt had a seizure 2 days ago. Does have a metallic taste in her mouth  Involved joints: hands , neck, shoulders  Pain scale:  9/10 from stomach pain;      Wakes the patient from sleep : Yes  Morning stiffness:Yes for 120 minutes  Meds used:cochicine , otezla 30mg twice daily     Interim history  Since last visit:  1. Infections - No  2. New symptoms/medical problem - Yes/ the cartilage in her chest is inflamed  3. Any side effects from Rheum medications -nausea from the otezla  3. ER visits/Hospitalizations/surgeries - No  4. Last PCP visit: yesterday     January 16, 2018  Have you ever seen a rheumatologist Yes Who You When 10/18/17  Joint pain history  Onset: pt states that she was in the hospital for 5 days before maribell, they told her she had lesions in her brain in the white matter. Had blood work drawn in the ER and they told her her inflammatory markers were elevated. Was seen in the ER last week  for vomiting and diarrhea, not eating. Saw Gastro. On 1/10/18. 1.5 weeks ago she had an endoscopy and colonoscopy. She has been having elbow  And hands and knee pain, loosing use of her hands. Been dropping things. Also states that she has been getting lesions up her nose  Involved joints: see above  Pain scale:  8/10     Wakes the patient from sleep : Yes  Morning stiffness:Yes for 120 minutes  Meds used:colchisine, otezla, magic mouth wash, kenolog cream, lidocaine gel     Interim history  Since last visit:  1. Infections - Yes/ had an infection in her jaw. Having sinus issues  2. New symptoms/medical problem - Yes/ states that she is having problems walking, states that she was bouncing when she was walking  3. Any side effects from Rheum medications -otezla, nausea  3. ER visits/Hospitalizations/surgeries - Yes/ see chart  4. Last PCP visit: November 2017 April 17, 2018  Have you ever seen a rheumatologist Yes Who You When 1/16/18  Joint pain history  Onset: pt states that she is not doing well. She is having increased stomach issues, only eats 2 times in 4 days unable to keep the otezla down due to vomiting . Has sleep study done in 1 week; no genital ulcers since last appointment. 6 small mouth sores since last appointment.   Involved joints: see above   Pain scale:  7/10     Wakes the patient from sleep : Yes  Morning stiffness:Yes for 60 minutes  Meds used:otezla , colchicine, diclofenac gel, magic mouthwash, lidocaine gel      Interim history  Since last visit:  1. Infections - Yes/ had a sinus infection, thinks she is getting sick now  2. New symptoms/medical problem - Yes/ neurology sent pt to cardiology. Has a heart condition but not sure what . She is seeing a ortho. Due to knee pain   3. Any side effects from Rheum medications -nausea/vomiting from the otezla   3. ER visits/Hospitalizations/surgeries - Yes/ seen in ER   4. Last PCP visit: yesterday     July 17, 2018  Have you ever seen a  rheumatologist Yes Who You When 4/17/18  Joint pain history  Onset: pt Is here for a follow-up states that she started to get lesions on her legs , face and arm. Hurts all over, lower back is very painful. Right hand and wrist area are numb  Involved joints: see above  Pain scale:  7/10   worse in the morning  Wakes the patient from sleep : Yes/ does not go to sleep till late  Morning stiffness:Yes for 4-5 hours minutes  Meds used:colchicine, otezla has  Not started otezla yet due to extreme nausea      Interim history  Since last visit:  1. Infections - Yes/ had a bacterial infection in her pelvic area was hospitalized  2. New symptoms/medical problem - Yes/ hands are swelling up. Having orthopedic issues. Was having problem with her veins sticking up, and very painful. Has happened multiply times   3. Any side effects from Rheum medications -see above  3. ER visits/Hospitalizations/surgeries - Yes/ hospital and ER for bacterial infection  4. 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 ER said may have blood clot in calf, but when did MRI, stated body may have broken it up on it's own. Elevated D-dimer  Involved joints: Knees, neck, hands  Pain scale:  6.5/10     Wakes the patient from sleep : Yes  Morning stiffness:Yes for 180 minutes  Meds used:hyoscyamine, not taking otezla. Last dose Sep. Patient did not want to take otezla with the antibiotics.      Last major mouth ulcer- aug or Sep  No genital ulcers in the last 6 months.      Interim history  Since last visit:  1. Infections - Yes, UTI's twice a month since last visit  2. New symptoms/medical problem - No  3. Any side effects from Rheum medications -otzela causes nausea  3. ER visits/Hospitalizations/surgeries - Yes, 1/2/19 repaired some ligaments and mass taken out, also joint build up removed from  a previous injury  4. Last PCP visit: 12/5/19 June 12, 2019  Have you ever seen a rheumatologist yes Who you When 1/9/19  Joint pain history  Onset: Patient is here for a follow up. A lot of infections and in and out of the hospital, who wanted her to follow up with Rheumatology again.  Involved joints: knees, legs, back, neck, shoulders, ankles  Pain scale:  6.5/10     Wakes the patient from sleep : Yes  Morning stiffness:Yes for 120 minutes  Meds used:magic mouthwash, colchicine     Interim history  Since last visit:  1. Infections - Yes, staph  2. New symptoms/medical problem - kidney failure  3. Any side effects from Rheum medications -none  3. ER visits/Hospitalizations/surgeries - Yes, 3 hospitalizations, and surgeries,  4. Last PCP visit: 6/11/19        Today 9/30/2020: New to me, establishing care. Flaring off otezla.     816   Reports worsening oral ulcers and joint pain and skin rash.     No vaginal ulcers currently. Last ones were 5 months ago.     Gets oral ulcers monthly, not today, had them last few days ago. They come up as flare up around period time. They self-resolve.     Thinks colcrys is helping but not enough, lost some benefit. No longer has diarrhea from it. the dose is 0.6 mg bid.     Came off otezla last year when she had severe staff infection, there was drug drug interaction with vancomycin. Wants to go back on it.     Skin lesions over chest are clearing up. Has skin lesions over her legs.     She is off of otezla >1 yr. She had daily vomiting and abdominal cramps initially which later resolved after 2 months.      Today, is her last day liz her health insurnaace  .     Reports pain and swelling liz hands. Drops things, lost strenghth. Veins look inflamed. Hands are swollen in AM and before bedtime. AM stiffness is 2 hours. can t tolerate ibuprofen.     Prednnisonne does not help much.     Wants to try eleve.     Had 2 blood clots over L arm, finished xraalto 4 months ago.      Was  told to have severe dry eyes, hs not had eyes checked> 1 yr as was in the hospital with staff infection. systanee nd gentle eyedrops help some, sometimes gets sharp pain and and blurry vision in her eyes from dryness. No h/o uveitis.     has dry mouth, drinks water.     Gets T  F sometimes. It is sporadic.     Lost hair.     Gets intermittent CP. Had several hospital visits and admissions in the [facility] for it. lorazepam helped witch chest spasms, she does not like pain meds.     She was prescribed 0.5 mgq d prn, 10 tbs/monthss.     She gets dry cough, just started using albuterol inhaler, it helps.     No SOB.     Has gastroparesis, IBS  nd h/o severas admission for constiption, colon blockage with impaction and gets bloating. has lost follow up with GI.     She is working with  to get medical assistance to get insurance back by early next year. She filed it again.     Gets botox inj every 3 months nd they help, has to cancel 10/20 botox inj s will not have insurance. they came back yesterday.     last seizure waas last year. still gets black out spells, salts ne was last week.    derm eye gi     FHx: Both parents have RA.  SHx: She was working in a [employer] shop, it was closed because of pandemic. sexually active, not on oral contraceptives. She thinks she had a misccraaaiaage last wk, had n period x2 months then mueller bleeding a lot, dark red and was different from period. Felt something came out that she feels she miscarried, no pos pregnancy test. No smoking. rarely drinks ETOH.  [facility]   thumbs between MCP tender   otezla helped with skin lesions, oral ulcers, joint pain.  colcrys  sjogre aleve biotene voltaren [id number]        8/11/2022: Samara presents for urgent visit to discuss m/o Behcet's flare during pregnancy, she is   She is pregnant 10 wk 3 days with ADRIAN 3/6/2023.  In 2020, had 1st trimester miscarriage.  Has LBP, ankle pain/swelling.   When she found out to be  pregnant, stopped otezla and colcrys but started to flare with Behcet's. Discussed with MFM, back on low dose otezla since last week, only 1 tab a day.    Interval History  10/28/2022  Mrs. Oropeza was last seen 08/11/22. At that time she was advised to continue otezla (1 tablet BID) and remain off colcrys. Since that time, she has instead taken otezla 1 tablet daily. She notes more joint pain in hands, wrists, cervical spine, knees with 4 hours of monring stiffness. Previously <1hr with full strength.     Had a few genital ulcers recently which only lasted a few days. Oral ulcers have been more active of late and are currently healing.     Abdominal pain on L side, pain is always worse after eating. Out of the phase of her pregnancy with morning sickness, at 22w on Monday.     She describes a few fevers at the end of September with hot flashes and cold sweats with Tmax 100. Called OB-GYN who instructed she should go in if persistent, though it resolved within a few days.     More easy bruising - thighs, she is currently on lovenox and baby aspirin. Previous history of clots especially with interventions. She recently had an issue with superficial thrombophlebitis following a blood draw.     Seeing more floaters in her vision. Sometimes tiny and clear, but can also be larger and 'gray', up to 1/4 of her vision. Has not seen Ophthalmology in many years.     More frequent migraines - did a nerve block recently but this has worn off, previous botox injections.     ADRIAN 03/06/23.    1/25/23:    Baby is growing small. Had high BP yesterday, they would monitor it. DBP was 90.    Increasing otezla to bid helped. It helped with ulcers. Has skin breakdown since Daryl, never had it like this over her chest, but still it feels related to Behcet.    Hands and ankles are painful and swoleln, ankles are new but had hand pain with behcet before,. This started fater entering 3rd [facility]. Her ADRIAN might be earlier based on baby's  growth, induction at 37 wk, progress at 39. nex twk will have another check.    Still has the neck pain. Still gets eye floaters.    Today 6/21/2023:    She delivered 1 month early due to pre-eclampsia. Had Mg infusion, had bleeding issues, spent a week. Was on BP med till a month ago, now stable. Was induced, had NVD. No blood transfusions needed.    Her baby girl Lashanda was born 2/12/2023, small, slow growth but healthy with no fetal deformities. Had vaginal sores after giving birth. Still gets mouth ulcers, one oral ulcer now, no vaginal ulcers now. Plans to breast feed x 7 months. Does breastfeeding. Saw OB/GYN in 4/2023 at [facility]. They are aware she is on otezla.    She was on ASA 81 mg every day during pregnancy.        Review of Systems    A comprehensive ROS was done. Positives are per HPI.    Past Medical History   Past Medical History:   Diagnosis Date     Anemia      Anxiety      Arthritis      Behcet's disease (H)      Cervical adenitis May 2010     Chronic abdominal pain      Constipation, chronic 1994     Fibromyalgia      Gastro-oesophageal reflux disease      Gastroparesis      Irregular heart beat     tachycardia, has had workup     Migraines      Neuromuscular disorder (H)     fibramyalgia     Palpitations      PONV (postoperative nausea and vomiting)      Seizure (H)      Seizures (H)     unknown etiology     Syncope      Tourette's       Past Surgical History:   Procedure Laterality Date     ARTHROSCOPY ANKLE, OPEN REPAIR LIGAMENT, COMBINED Left 9/25/2019    Procedure: LEFT ANKLE ARTHROSCOPY WITH LIGAMENT REPAIR;  Surgeon: Andres Johnson DPM;  Location:  OR     ARTHROSCOPY ANKLE, REPAIR LIGAMENT Left 1/2/2019    Procedure: Ankle arthroscopy and sinus tarsi evacuation, ligament repair, left lower extremity;  Surgeon: Andres Johnson DPM;  Location:  OR     ARTHROSCOPY KNEE WITH PATELLAR REALIGNMENT  7/25/2013    Procedure: ARTHROSCOPY KNEE WITH PATELLAR REALIGNMENT;  Left  Knee Arthroscopy, Medial Patellofemoral Ligament Reconstruction with Allograft  ;  Surgeon: Jennifer Acevedo MD;  Location: [facility]     COLONOSCOPY  2015     DENTAL SURGERY  1996    Teeth removal     ENDOSCOPY UPPER, COLONOSCOPY, COMBINED  2005     HC ESOPH/GAS REFLUX TEST W NASAL IMPED >1 HR N/A 2/15/2017    Procedure: ESOPHAGEAL IMPEDENCE FUNCTION TEST WITH 24 HOUR PH GREATER THAN 1 HOUR;  Surgeon: Timothy Matta MD;  Location: [facility]     IR PICC PLACEMENT > 5 YRS OF AGE  3/13/2019     IR UPPER EXTREMITY VENOGRAM LEFT  2/25/2020     IRRIGATION AND DEBRIDEMENT FOOT, COMBINED Left 3/12/2019    Procedure: COMBINED IRRIGATION AND DEBRIDEMENT LEFT ANKLE;  Surgeon: Micha Glover MD;  Location: [facility]     IRRIGATION AND DEBRIDEMENT LOWER EXTREMITY, COMBINED Left 5/7/2019    Procedure: 1.  Excision of wound down to and including deep fascia, less than 20 cm2.  2.  Irrigation and debridement, left ankle.;  Surgeon: Andres Johnson DPM;  Location: [facility]     PICC INSERTION Left 05/05/2019    4Fr - 45cm (5cm external), Basilic vein, SVC RA junction      Patient Active Problem List    Diagnosis Date Noted     Preeclampsia 02/10/2023     Priority: Medium     Infection of joint of ankle (H) 05/06/2019     Priority: Medium     Cellulitis 03/09/2019     Priority: Medium     Displacement of lumbar intervertebral disc without myelopathy 11/13/2018     Priority: Medium     Overview:   Created by Conversion       Iron deficiency associated with nonfamilial restless legs syndrome 11/13/2018     Priority: Medium     Enthesopathy of hip region 11/13/2018     Priority: Medium     Overview:   Created by Conversion       Tourette's syndrome 11/13/2018     Priority: Medium     Overview:   Created by Conversion       Somatic symptom disorder 06/01/2018     Priority: Medium     Pelvic floor weakness 04/25/2018     Priority: Medium     Spells of decreased attentiveness 12/19/2017     Priority: Medium     Mobile cecum  11/09/2017     Priority: Medium     Cecum noted in Right lower quadrant on 4/17 CT scan, and in Left upper Quadrant on CT on 11/2017.       Vitamin D deficiency 10/11/2017     Priority: Medium     How low, unknown/not found. On D when tested at 27. Starting cholecalciferol October 2017. Needs recheck.       Convulsions, unspecified convulsion type (H) 10/03/2017     Priority: Medium     Transient alteration of awareness 10/03/2017     Priority: Medium     Chronic pain syndrome 07/27/2017     Priority: Medium     Major depressive disorder, recurrent episode, moderate (H) 06/27/2017     Priority: Medium     Cervical pain 05/02/2017     Priority: Medium     Acute left ankle pain 03/31/2017     Priority: Medium     Cervical dystonia 03/28/2017     Priority: Medium     PTSD (post-traumatic stress disorder) 01/17/2017     Priority: Medium     Patellofemoral instability 10/20/2016     Priority: Medium     Fibromyalgia 08/04/2016     Priority: Medium     Rheumatoid arthritis of multiple sites without rheumatoid factor (H) 08/04/2016     Priority: Medium     Raynaud's disease without gangrene 08/04/2016     Priority: Medium     Chronic abdominal pain 08/04/2016     Priority: Medium     Palpitations 01/12/2016     Priority: Medium     On colchicine therapy 10/30/2015     Priority: Medium     Spell of shaking 05/06/2015     Priority: Medium     Migraine 02/04/2015     Priority: Medium     Behcet's disease (H) 12/10/2014     Priority: Medium     Headaches due to old head injury 11/12/2013     Priority: Medium     Milk protein intolerance 10/11/2013     Priority: Medium     Intestinal malabsorption 10/11/2013     Priority: Medium     Concussion 02/13/2013     Priority: Medium     Jan 2013, with prolonged recovery- followed by sports med         Knee pain 01/03/2013     Priority: Medium     Generalized anxiety disorder 06/25/2009     Priority: Medium     Tics - Tourette syndrome 05/18/2009     Priority: Medium     Followed by  psychotherapy. Symptoms well managed. [facility] (Dr. Simpson)           IBS (irritable bowel syndrome) 05/18/2009     Priority: Medium     Allergic rhinitis 05/18/2009     Priority: Medium     GERD (gastroesophageal reflux disease) 01/10/2008     Priority: Medium     Gastroparesis 1994     Priority: Medium      Family History   Problem Relation Age of Onset     Depression Mother      Neurologic Disorder Mother         Migraines, take imitrex injection.  Also in maternal grandmother.       Alcohol/Drug Father      Hypertension Father      Depression Father      Osteoarthritis Father      Cardiovascular Maternal Grandmother      Depression Maternal Grandmother      Hypertension Maternal Grandmother      Alzheimer Disease Maternal Grandmother      Cardiovascular Maternal Grandfather      Hypertension Maternal Grandfather      Depression Maternal Grandfather      Alcohol/Drug Maternal Grandfather      Diabetes Maternal Grandfather      Cardiovascular Paternal Grandmother      Hypertension Paternal Grandmother      Cardiovascular Paternal Grandfather      Hypertension Paternal Grandfather      Glaucoma No family hx of      Macular Degeneration No family hx of       Allergies   Allergen Reactions     Amoxil [Penicillins] Rash     Dad unsure of reaction.     Bee Venom Anaphylaxis     Bioflavonoids Anaphylaxis     Citrus Anaphylaxis     All Titus     Contrast Dye Rash     Contrast Media Ready-Box [facility], 04/09/2014.; Contrast Media Ready-Box [facility], 04/09/2014.  NOTE: this is a contrast media oral with iodine. Premedicate with methylpred standard for IV contrast, request barium contrast for oral contrast.     Iodinated Contrast Media Hives and Rash     Contrast Media Ready-Box [facility], 04/09/2014.; Contrast Media Ready-Box [facility], 04/09/2014.  NOTE: this is a contrast media oral with iodine. Premedicate with methylpred standard for IV contrast, request barium contrast for oral contrast.      Pineapple Anaphylaxis, Difficulty breathing and Rash     Reglan [Metoclopramide] Other (See Comments)     IV dose only, in ER, rapid heart rate.     Ace Inhibitors      Difficulty in breathing and GI upset     Amitiza [Lubiprostone] Nausea and Vomiting     Amoxicillin-Pot Clavulanate      Midazolam Unknown     parent states that when pt takes this medication, she wakes up being very violent .     Midazolam Hcl      Coming out of pelvic exam at age of 5, was kicking and screaming when coming out of the versed.     Other [No Clinical Screening - See Comments]      Bleech/ chest tightness, itchy throat, swollen tongue, hives     Tizanidine Other (See Comments)     Confusion, back pain, photophobia, abdominal pain, shaking, anxious       Adhesive Tape Rash     Azithromycin Hives and Rash     Cephalexin Itching and Rash     Sulfa Antibiotics Rash     Skin scarring      Current Outpatient Medications   Medication Sig Dispense Refill     acetaminophen (TYLENOL) 325 MG tablet Take 650 mg by mouth every 6 hours as needed for mild pain       albuterol (PROAIR HFA/PROVENTIL HFA/VENTOLIN HFA) 108 (90 Base) MCG/ACT inhaler Inhale 2 puffs into the lungs every 6 hours as needed for shortness of breath / dyspnea or wheezing 1 Inhaler 1     amitriptyline (ELAVIL) 25 MG tablet TAKE 1 TABLET BY MOUTH EVERY NIGHT AT BEDTIME 90 tablet 1     apremilast (OTEZLA) 30 MG tablet Take 1 tablet (30 mg) by mouth 2 times daily Hold for signs of infection, and seek medical attention. 60 tablet 5     artificial tears OINT ophthalmic ointment 0.5 inch strip each eye at night 1 Tube 11     benzocaine (TOPICALE XTRA) 20 % GEL Apply as needed locally to mouth or nasal ulcers for pain; 4 times daily as needed 30 g 1     betamethasone valerate (VALISONE) 0.1 % cream Apply topically 2 times daily as needed        bisacodyl (DULCOLAX) 5 MG EC tablet Take 2 tablets (10 mg) by mouth daily as needed for constipation 30 tablet 0     citalopram (CELEXA) 20 MG  tablet Take 1 tablet (20 mg) by mouth daily 90 tablet 1     EPINEPHrine (EPIPEN 2-EAMON) 0.3 MG/0.3ML injection Inject 0.3 mLs (0.3 mg) into the muscle as needed for anaphylaxis 0.6 mL 3     fluocinonide (LIDEX) 0.05 % external gel Apply topically 2 times daily 60 g 3     gabapentin (NEURONTIN) 300 MG capsule Take 1 capsule (300 mg) by mouth every morning 60 capsule 1     hydrocortisone, Perianal, (ANUSOL-HC) 2.5 % cream Place rectally 3 times daily as needed for hemorrhoids 30 g 0     hypromellose (GENTEAL) 0.3 % SOLN 1 drop every hour as needed for dry eyes        ibuprofen (ADVIL/MOTRIN) 800 MG tablet Take 1 tablet (800 mg) by mouth every 6 hours as needed for other (cramping) 40 tablet 1     lanolin ointment Apply topically every hour as needed for other (sore nipples) 7 g 3     lidocaine (LMX4) 4 % external cream Apply topically once as needed for mild pain 120 g 1     LINZESS 290 MCG capsule TAKE 1 CAPSULE BY MOUTH EVERY DAY IN THE MORNING BEFORE BREAKFAST 90 capsule 0     mometasone (ELOCON) 0.1 % external ointment Apply topically 2 times daily 45 g 3     polyethylene glycol (MIRALAX/GLYCOLAX) powder Take 1 capful by mouth 3 times daily       Prenatal MV-Min-Fe Fum-FA-DHA (PRENATAL 1 PO)        sodium fluoride 1.1 % CREA Apply 1 Application topically daily       triamcinolone (KENALOG) 0.1 % external ointment Apply twice daily as needed to lesions on the genitals and body. OK to use very sparingly on the face. 454 g 2     benzocaine (AMERICAINE) 20 % external aerosol Apply to perineum four times daily as needed for pain (Patient not taking: Reported on 5/12/2023) 57 g 3     docusate sodium (COLACE) 100 MG capsule Take 1 capsule (100 mg) by mouth daily (Patient not taking: Reported on 5/12/2023) 30 capsule 0     enoxaparin ANTICOAGULANT (LOVENOX) 40 MG/0.4ML syringe Inject 0.4 mLs (40 mg) Subcutaneous every 24 hours (Patient not taking: Reported on 5/12/2023) 16 mL 1     enoxaparin ANTICOAGULANT (LOVENOX) 40  MG/0.4ML syringe  (Patient not taking: Reported on 5/30/2023)       lactulose 20 GM/30ML SOLN Take 30 mLs by mouth 3 times daily as needed (for constipation) (Patient not taking: Reported on 5/12/2023) 300 mL 3     NIFEdipine ER OSMOTIC (ADALAT CC) 60 MG 24 hr tablet Take 1 tablet (60 mg) by mouth every 12 hours (Patient not taking: Reported on 5/12/2023) 90 tablet 1     NIFEdipine ER OSMOTIC (PROCARDIA XL) 30 MG 24 hr tablet Take 2 tablets (60 mg) by mouth daily (Patient not taking: Reported on 2/28/2023) 60 tablet 1     order for DME Equipment being ordered: Trilok brace 8 1/2 left lower extremity (Patient not taking: Reported on 5/12/2023) 1 Device 0     order for DME Equipment being ordered: size 8 1/2 walking boot tall (Patient not taking: Reported on 5/12/2023) 1 Device 0     sucralfate (CARAFATE) 1 GM/10ML suspension Take 10 mLs (1 g) by mouth 4 times daily (Patient not taking: Reported on 5/12/2023) 1200 mL 2           Physical Exam     Not done      There is currently no information documented on the homunculus. Go to the Rheumatology activity and complete the homunculus joint exam.  Joint Exam 06/21/2023     No joint exam has been documented for this visit          Data   Data      10/28/2022   BEAUCHAMP-28 (ESR) --   BEAUCHAMP-28 (CRP) --   Tender (BEAUCHAMP-28) 2 / 28    Swollen (BEAUCHAMP-28) 0 / 28    Provider Global --   Patient Global --   ESR 17 mm/hr   CRP --     No results found for any visits on 06/21/23.  CBC RESULTS: Recent Labs   Lab Test 09/07/20  1147   WBC 4.1   RBC 5.09   HGB 13.0   HCT 43.0   MCV 85   MCH 25.5*   MCHC 30.2*   RDW 15.0        TSH   Date Value Ref Range Status   09/07/2020 0.77 0.40 - 4.00 mU/L Final   03/21/2019 0.53 0.40 - 4.00 mU/L Final   10/30/2018 1.06 0.40 - 4.00 mU/L Final   11/26/2016 0.87 0.40 - 4.00 mU/L Final     T4 Free   Date Value Ref Range Status   01/31/2007 1.26 0.70 - 1.85 ng/dL Final     Rheumatoid Factor   Date Value Ref Range Status   09/30/2020 <7 <12 IU/mL Final    05/06/2016 <20 <20 IU/mL Final       Reviewed Rheumatology lab flowsheet

## 2023-06-21 NOTE — NURSING NOTE
Is the patient currently in the state of [place]? YES    Visit mode:TELEPHONE    If the visit is dropped, the patient can be reconnected by: TELEPHONE VISIT: Phone number: [phone]    Will anyone else be joining the visit? NO    How would you like to obtain your AVS? MyChart    Are changes needed to the allergy or medication list? YES: Pt states taking Lorazepam 1MG tablet as needed.     Reason for visit: RECHECK    Medication and allergies have been reviewed.     Master Lord, VF

## 2023-06-21 NOTE — LETTER
2023       RE: Samara Oropeza  [address]  [facility] [place] [id number]     Dear Colleague,    Thank you for referring your patient, Samara Oropeza, to the [facility] [place] [facility]. Please see a copy of my visit note below.    Virtual Visit Details    Type of service:  Telephone Visit   Phone call duration: 11 minutes       Rheumatology Clinic Return Visit Patient     Samara Oropeza MRN# [id number]   YOB: 1995 Age: 27 year old     Date of Visit: 2023   Last seen: 2023  Primary care provider: [facility]          Assessment & Plan    Assessment & Plan   Samara is a 27 year old  female (ADRIAN 21) with Behçet's disease (pathergy, oral/genital ulcers, polyarthralgia) who presents today for RECHECK  .    # Behçet's disease (pathergy, arthralgias, recurrent oral / genital ulcers)  # s/p delivery on 2023 with high-risk pregnancy, complicated by pre-eclampsia,  birth but healthy baby    10/2022:  Mrs. Oropeza arrives for follow-up of her Behçet's disease that is more active at present with recent decrease of her Otezla (60mg --> 30mg) recommended by [facility]. Previous discussion with her OB providers had been to decrease to the lowest effective dose, clearly 30mg is not effective as she describes worsened oral ulcers, genital ulcers, arthralgias, abdominal pain, and thrombophlebitis. Although there is not a great deal of evidence for Otezla safety in pregnancy, its mechanism of action would suggest it. Counterbalancing these concerns are what we understand of Behçet's in pregnancy which more often improves but in some cases (~29%) becomes worse, and can flare more often in the 1st trimester and post-lorena period.  She also describes some vision changes, which in the context of Behçet's is conerning. She has not seen Ophthalmology in  many years; I will place a referral today.     Today 6/21/2023: Stable today, on otezla 30 mg bid, breastfeeding, MFM is ok with breastfeeding, discussed ACR reproductive guideline, it does not recommend otezla during pregnancy and breastfeeding given lack of data; however Samara remained on it during pregnancy and now breastfeeding as stopping it leads to severe flare of her behcet and benefits of staying on it outweighs risks. Her MFM provider is aware of staying on otezla and is ok with it during breastfeeding.      Plan:      Continue otezla 30mg BID    Return in person in 6 months        Dominga Sosa MD             Medical History   History of Present Illness   Samara Oropeza is a 27 year old female who presents for follow-up of her Behçet's in the context of pregnancy.      Seen Dr. Marilyn Moran Rheumatology [place] [facility] [place]:    Original presentation 2014:     Ms Oropeza is a 19 y.o. female who presents with one year of presentation of painful oral ulcers (monthly) once that have worsened with two episodes in the last two months that presented with painful vulvar or vaginal ulcers (2 episodes so far every month) [not sure if they leave scar] as well that timing coincides with [place] [place]   ORAL ULCERS: last two episodes were severe, painful, white base with erythematous halo, that appear and disappear in the matter of 1-2 weeks without, does not bleed and doesn't respond to any treatment used (magic mouthwash), 10-15 in number with the biggest one being dime size.      VAGINAL ULCERS: 2-3 in number between labia majora and minora that occur simultaneously with oral ulcers, painful, non bleeding ulcers that are erythematous, no pus.      FOLLICULITIS: patient reports having spots in legs specially around ankle and knee, but arms, and neck are affected as well. Small lesions that resemble ingrown hair, most are red erythematous elevated lesions, well demarcated, some with liquid that patient  refers as pimple like.      SKIN RASHES: welts and red macules with well defined borders that are pruritic and non-ulcerative on chest, arms and back. Benadryl relieves.      ARTHRALGIAS: In descending order of severity: hips, knees, wrists, shoulders, neck, lumbar, fingers.   C/o morning stiffness in hips, back and neck lasting for about until noon.      Ms. Oropeza reports they present in severe flares and never fully resolve, but do get better. She has seen some swelling and warmth on the affected joints but has not noticed and redness. Has tried Tylenol, ibuprofen, and hydrocodone with not much benefit.      FEVERS: Reports 3-4 sporadic episodes per month of self limited fevers of 38 C that occur during the evenings and associate facial flushing.      HEADACHES: occur daily and are bitemporal and irradiate occipitally, pulsatile in nature, intensity varies from intense to mild, are worsened with movement. On treatment with ibuprofen and somatriptan without any positive results.      VISION CHANGES: Patient reports that suddenly she would only see a gray blotch in her right eyes and would persist like this for a day and a half. Also reports blurriness in her left eye. Presence of pain and burning sensation of eyeball with associated redness. Has changed prescription glasses in the matter of 2 years 3 times. She has had opthalmology exam and was not suggestive of any evidence of uveitis.   She was also evaluated in ED for a dizzy spell.      ABD PAIN W/ INTERMITTENT DIARRHEA AND CONSTIPATION: Patient reports abdominal pain that is intermittent, periods of 7 days without bowel movement and feeling bloated with change of pattern to diarrhea (liquidy without blood nor mucus, non foul smelling). Has taken Bentyl, Zegrid, and rinetidine with mild clinical improvement.  She also reports small red nodules after each needle stick for blood draw.   Neurology saw her back then and was not impressed with her presentation as  physical examination was normal. Also MRI brain normal: Findings: No definite hemorrhage, mass affect, midline shift, or ventriculomegaly is noted.There are a few scattered non specific white matter T2 hyperintensities. Axial diffusion weighted images are unremarkable.     The major vascular structures appear patent. There is opacification and severe mucosal thickening in the right maxillary sinus. The remaining paranasal sinuses, and mastoid air cells are clear. Orbits are unremarkable  She has + HSV-1 IGG. Seen ID. Negative for Herpes simplex virus culture. HSV IGM I/II COMBINATION SENT TO [facility]  RESULTS = <0.91  REF RANGE: <0.91 = NEGATIVE  ID did not think HSV could explain her mouth and genital sores.      CRP within normal limits. HIV negative. CBC within normal limits; UA negative. Creatinine within normal limits   CYNDIE neg.  Antigliadin neg.   ANti TTG neg.   [facility] 2014: Colonoscopy and endoscopy neg; result not available. Not sure if biopsies were done.   Raynaud's phenomenon:  Onset: about 2013  Digits: all fingers  Digital ulcers: no  Color changes: white ---> purple and red  Duration of an attack: About couple of hours per mom  Pain during attack: not clear pain but bruise like feeling  Frequency of attacks: few times a month  Family history of Raynauds: no  GERD: very long time     No hemoptysis.   No miscarriages/ no thrombosis in past.   No family or personal history of psoriasis, ulcerative colitis or chron's disease.   No buttock pain or low back pain or stiffness in AM     Interim history:  Dec 2014- Multiple mouth ulcers and did not eat for 5 days. This coincided with periods. Colchicine 0.6mg PO BID started in Nov 2014.   Prednisone taper in Dec 20mg to 0 in 4 weeks. She also used magic mouthwash. Kenalog cream. After going to the ER, 3 days later her ulcers were better. She thinks it improved after the menstruation stopped.   LMP 3 weeks ago.   COLCHICINE HAS HELPED A LOT REDUCING THE INTENSITY  "OF MENSTRUATION ASSOCIATED ORAL AND VULVAR ULCERS.      Interim history: 6/15     Since last visit only two episodes of mouth sores- small sized 6   No genital ulcers since last visit.   Colchicine 1.2 mg in AM and 0.6mg in PM keeps her symptoms under control.      Admitted in 5/15:  Admission Diagnoses:  - LUE weakness  - spell  - hx of migraines  - hx of Tourette syndrome  - hx of Behcet's disease     Discharge Diagnoses:   - spell likely 2/2 anxiety  - hx of migraines  - hx of Tourette syndrome  - hx of Behcet's disease     CT and MRI brain was normal.      Seeing Dr. Lilliana Miramontes soon as outpatient.      Dr. Garcia did not find any intraocular inflammation.       Interm 10/30/2015  ED for migraine. Continues to see neuro - MRI brain without lesions noted. Saw GI - upper barium normal. May be considering repeat colo. Worsening lesions in mouth and genitals. Has had continuous lesion in mouth x 2 months. 2 genital ulcers. Had fracture of right sesamoid in foot. Continues to have low grade fevers. New folliculitis on chest, legs and arms.      Interim hx: 1/11/2016    Pt states that since last visit she continues to have oral ulcers and has noted more folliculitis-like lesions on her lower extremities.  She states that on her right lower leg she had a red macular spot that has since resolved.  She denies uveitis.  She states that the colchicine initially helped but then stopped working.  She takes 0.6 mg TID.  She stopped taking her prednisone last week as she feels like this hasn't helped.  She hasn't had any episodes of vaginal ulcers.      She saw GI who stated she has gastroparesis.  She is seeing Cardiology later this week for possible syncopal episodes.       Interim history 5/16  Mouth ulcers X 1 last month  Genital ulcers - none since last visit.      Bilateral MCPs pain, knee pain and low back pain +ve increased since last visit  Morning stiffness X 2 hours (increasing)   5-6/10     \"overall myalgia\" " has gotten worse    C/o pseudofolliculitis lesion on anterior shins bilaterally worse     Interim history: (7/18/2016)  Patient has just started Humira for 2 doses on 7/1 and 7/15 and reported minimal improvement of her hip pain but otherwise, did not notice anything different.      She reported painful mouth ulcer once a month since last visit but noticed that it has been getting deeper.   Denied any genital ulcers.     Still has ongoing bilateral MCPs pain, knee pain but this has been intermittent and she did not have any much pain today. She reported 2-3 hours morning stiffness of both hands and pain score of 6/10 at her knees and 8/10 of her hands but currently takes no pain medication except prn ativan which she takes when her muscle is really tight.      The only concern is that she gained weight even though she has not been eating much (eat once a day) and do exercises every day for 1 hour (with video of yoga, pilates). Her mother wonders if she needs to have some labs work up include sex hormone, thyroid, cortisol.     Interval history 9/14/16  Patient was started Humira at the last visit, patient reports worsening of skin ulcers since starting Humira and stopped taking Humura for the past 2 weeks. Patient reports oral and genital ulcers has been stable even before starting Humira and has not had any interval oral/genital ulcers. However she reports recurrent skin ulcers occurring on a daily basis that affects her chest and anterior legs. Patient also has stopped taking prednisone, she reports that she doesn't feel the prednisone helps, her last dose of prednisone was 6+ months ago. Patient also report worsening low back pain that sometimes causes her to limp.     In the interval patient also visited ED on 8/31/16 for left hand pain and what the patient describes as phlebitis, no medication or procedure was done and the patient was discharged with a splint. Patient also reported 1 episode of chest pressure  that was associated with dyspnea and numbness of the left arm, she was shopping in a supermarket at the time of onset, and the pressure resolved after she took a nap.     Patient denies any infectious symptoms in the interval, no fever, chills, night sweat, cough, dysuria, denies eye pain or visual changes.     November 4, 2016  Oct - had one genital ulcer  Oral ulcers X 5- around menstruation time.   Knee pain- chronic on the left side  Dizziness spells - on and off; been to the ER for that in the past.   On and off migraines  July 2013 - s/p MPFL reconstruction plus LRL 7/2013  Morning stiffness in knee (left) X 1 hour     Severe jaw pain and chest pain- patient wondering if this is Tourette's or esophageal spasms. Cardiac workup negative.   Fever      January 13, 2017  [place] [facility] [place] from [place].   Chest pain on and off still continues. Painful with deep breaths.   Oral ulcers - few minor ones lasting for one week;   One major one in roof of mouth lasting for about one week.   Knee pain +ve - reviewed the recent MRI with her orthopedic surgeon.   On and off migraines  otezla is approved. Still waiting to receive the meds.      March 31, 2017  Otezla current dose 15mg PO BID.   She is tolerating okay at this dose and is willing to increase the dose further up to 30mg BID.   Her joint pains are better on otezla. Knee pain is also better.   Back and neck pain +ve 8/10 when it is worse. Intramuscular botox injections were given on Monday in PMR.   2 minor genital ulcers in the interim- resolved.   Few oral ulcers in the interim- resolved.   Nasal ulcer - resolved.   Migraines on and off.   Nausea with otezla but improving.   Dizziness and blackouts on and off. She fell once and hurt her ankle. Wearing boot in left leg.   Complete ROS negative except for above     May 17, 2017  Tolerating otezla better. Not throwing up anymore. Current dose 20mg in AM and 30mg in PM (2nd week).    Patient thinks that her oral ulcers frequency and severity are improving with otezla. Also no genital ulcers in the interim.   Currently on doxycycline for bronchitis/?pneunomia. 4 more days left.      Joint pain history  2 weeks ago/ dx with pneumonia 1 week ago/  Involved joints: all over  Pain scale: 6.5/10   Wakes the patient from sleep : Yes  Morning stiffness: Yes for 120 minutes  Meds used:otezla,colchicine     Interim history  Since last visit:  1. Infections - Yes/ pneumonia  2. New symptoms/medical problem - Yes/ thinks she may have had a mini-seizure about 10 days ago ; did not seek medical attention; did not reoccur after that. Patient seeing PCP today.  3. Any side effects from Rheum medications -vomiting a lot (resolved)  3. ER visits/Hospitalizations/surgeries - Yes  4. Last PCP visit: has an apt. today     Patient still getting double vision. Floaters present.      Therapist recommended psychiatry evaluation. Patient does not want to see psychaitry. Patient will see PCP today.      August 22, 2017  Have you ever seen a rheumatologist Yes Who You When 5/17/17     NO genital ulcers in the interim.   Mouth ulcers- three in the back of the throat and roof of the mouth last month.      Joint pain history  Onset: doing alittle worse / cut her otezla back to 30mg  Daily due to GI problems  Involved joints: all over her body. Been having more black out episodes, been having more chest pains.also has raised bumps on both legs from the knees down that itch and are painful   Pain scale:  5.5/10     Wakes the patient from sleep : Yes  Morning stiffness:Yes for 120 minutes  Meds used:otezla, colchicine (three pills daily)     Interim history  Since last visit:  1. Infections - Yes stomach issue  2. New symptoms/medical problem - No  3. Any side effects from Rheum medications -nausea from otezla  3. ER visits/Hospitalizations/surgeries - Yes, ER for foot, ankle injury from passing out and fell down the steps.  Hit her head another time from passing out. Alcohol poisoning in July 4. Last PCP visit: 6/23/17 September 19, 2017  Have you ever seen a rheumatologist Yes Who You When 8/22/17  Joint pain history  Onset: pt states that she hurts everywhere for 6 days  Involved joints: all joints  Pain scale:  7/10     Wakes the patient from sleep : Yes/ not sleeping at all  Morning stiffness:Yes for 180 minutes  Meds used:colchicine, otezla, magic mouth wash, lidocaine gel  Last one week: increased joint pain; and genital ulcer  Genital lesion (dimed size) in the last one week  After botox a week ago, she had fever 101 for three days; near syncopes. Back pain; floaters  Chest pain , mouth sores, hand pain, morning pain were all better with otezla 30mg twice daily.     Interim history  Since last visit:  1. Infections - No  2. New symptoms/medical problem - No  3. Any side effects from Rheum medications -nausea from the otezla  3. ER visits/Hospitalizations/surgeries - No  4. Last PCP visit: may 2017      October 18, 2017     Have you ever seen a rheumatologist Yes Who You When 9/19/17  Joint pain history  NO mouth or genital sores in the last 4 weeks.   Medrol dosepak helped with visual symptoms but not joint pains.      Onset: pt states that she is doing better than last time with her behcet's. Today has severe stomach pains, states that she is constipated. Pt had a seizure 2 days ago. Does have a metallic taste in her mouth  Involved joints: hands , neck, shoulders  Pain scale:  9/10 from stomach pain;      Wakes the patient from sleep : Yes  Morning stiffness:Yes for 120 minutes  Meds used:cochicine , otezla 30mg twice daily     Interim history  Since last visit:  1. Infections - No  2. New symptoms/medical problem - Yes/ the cartilage in her chest is inflamed  3. Any side effects from Rheum medications -nausea from the otezla  3. ER visits/Hospitalizations/surgeries - No  4. Last PCP visit: yesterday     January 16,  2018  Have you ever seen a rheumatologist Yes Who You When 10/18/17  Joint pain history  Onset: pt states that she was in the hospital for 5 days before maribell, they told her she had lesions in her brain in the white matter. Had blood work drawn in the ER and they told her her inflammatory markers were elevated. Was seen in the ER last week for vomiting and diarrhea, not eating. Saw Gastro. On 1/10/18. 1.5 weeks ago she had an endoscopy and colonoscopy. She has been having elbow  And hands and knee pain, loosing use of her hands. Been dropping things. Also states that she has been getting lesions up her nose  Involved joints: see above  Pain scale:  8/10     Wakes the patient from sleep : Yes  Morning stiffness:Yes for 120 minutes  Meds used:colchisine, otezla, magic mouth wash, kenolog cream, lidocaine gel     Interim history  Since last visit:  1. Infections - Yes/ had an infection in her jaw. Having sinus issues  2. New symptoms/medical problem - Yes/ states that she is having problems walking, states that she was bouncing when she was walking  3. Any side effects from Rheum medications -otezla, nausea  3. ER visits/Hospitalizations/surgeries - Yes/ see chart  4. Last PCP visit: November 2017 April 17, 2018  Have you ever seen a rheumatologist Yes Who You When 1/16/18  Joint pain history  Onset: pt states that she is not doing well. She is having increased stomach issues, only eats 2 times in 4 days unable to keep the otezla down due to vomiting . Has sleep study done in 1 week; no genital ulcers since last appointment. 6 small mouth sores since last appointment.   Involved joints: see above   Pain scale:  7/10     Wakes the patient from sleep : Yes  Morning stiffness:Yes for 60 minutes  Meds used:otezla , colchicine, diclofenac gel, magic mouthwash, lidocaine gel      Interim history  Since last visit:  1. Infections - Yes/ had a sinus infection, thinks she is getting sick now  2. New symptoms/medical  problem - Yes/ neurology sent pt to cardiology. Has a heart condition but not sure what . She is seeing a ortho. Due to knee pain   3. Any side effects from Rheum medications -nausea/vomiting from the otezla   3. ER visits/Hospitalizations/surgeries - Yes/ seen in ER   4. Last PCP visit: yesterday     July 17, 2018  Have you ever seen a rheumatologist Yes Who You When 4/17/18  Joint pain history  Onset: pt Is here for a follow-up states that she started to get lesions on her legs , face and arm. Hurts all over, lower back is very painful. Right hand and wrist area are numb  Involved joints: see above  Pain scale:  7/10   worse in the morning  Wakes the patient from sleep : Yes/ does not go to sleep till late  Morning stiffness:Yes for 4-5 hours minutes  Meds used:colchicine, otezla has  Not started otezla yet due to extreme nausea      Interim history  Since last visit:  1. Infections - Yes/ had a bacterial infection in her pelvic area was hospitalized  2. New symptoms/medical problem - Yes/ hands are swelling up. Having orthopedic issues. Was having problem with her veins sticking up, and very painful. Has happened multiply times   3. Any side effects from Rheum medications -see above  3. ER visits/Hospitalizations/surgeries - Yes/ hospital and ER for bacterial infection  4. 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 ER said may have blood clot in calf, but when did MRI, stated body may have broken it up on it's own. Elevated D-dimer  Involved joints: Knees, neck, hands  Pain scale:  6.5/10     Wakes the patient from sleep : Yes  Morning stiffness:Yes for 180 minutes  Meds used:hyoscyamine, not taking otezla. Last dose Sep. Patient did not want to take otezla with the antibiotics.      Last major mouth ulcer- aug or Sep  No genital ulcers in the last 6  months.      Interim history  Since last visit:  1. Infections - Yes, UTI's twice a month since last visit  2. New symptoms/medical problem - No  3. Any side effects from Rheum medications -otzela causes nausea  3. ER visits/Hospitalizations/surgeries - Yes, 1/2/19 repaired some ligaments and mass taken out, also joint build up removed from a previous injury  4. Last PCP visit: 12/5/19 June 12, 2019  Have you ever seen a rheumatologist yes Who you When 1/9/19  Joint pain history  Onset: Patient is here for a follow up. A lot of infections and in and out of the hospital, who wanted her to follow up with Rheumatology again.  Involved joints: knees, legs, back, neck, shoulders, ankles  Pain scale:  6.5/10     Wakes the patient from sleep : Yes  Morning stiffness:Yes for 120 minutes  Meds used:magic mouthwash, colchicine     Interim history  Since last visit:  1. Infections - Yes, staph  2. New symptoms/medical problem - kidney failure  3. Any side effects from Rheum medications -none  3. ER visits/Hospitalizations/surgeries - Yes, 3 hospitalizations, and surgeries,  4. Last PCP visit: 6/11/19        Today 9/30/2020: New to me, establishing care. Flaring off otezla.     816   Reports worsening oral ulcers and joint pain and skin rash.     No vaginal ulcers currently. Last ones were 5 months ago.     Gets oral ulcers monthly, not today, had them last few days ago. They come up as flare up around period time. They self-resolve.     Thinks colcrys is helping but not enough, lost some benefit. No longer has diarrhea from it. the dose is 0.6 mg bid.     Came off otezla last year when she had severe staff infection, there was drug drug interaction with vancomycin. Wants to go back on it.     Skin lesions over chest are clearing up. Has skin lesions over her legs.     She is off of otezla >1 yr. She had daily vomiting and abdominal cramps initially which later resolved after 2 months.      Today, is her last day liz her  health insurnaace  .     Reports pain and swelling liz hands. Drops things, lost strenghth. Veins look inflamed. Hands are swollen in AM and before bedtime. AM stiffness is 2 hours. can t tolerate ibuprofen.     Prednnisonne does not help much.     Wants to try eleve.     Had 2 blood clots over L arm, finished xraalto 4 months ago.      Was told to have severe dry eyes, hs not had eyes checked> 1 yr as was in the hospital with staff infection. systanee nd gentle eyedrops help some, sometimes gets sharp pain and and blurry vision in her eyes from dryness. No h/o uveitis.     has dry mouth, drinks water.     Gets T  F sometimes. It is sporadic.     Lost hair.     Gets intermittent CP. Had several hospital visits and admissions in the [facility] for it. lorazepam helped witch chest spasms, she does not like pain meds.     She was prescribed 0.5 mgq d prn, 10 tbs/monthss.     She gets dry cough, just started using albuterol inhaler, it helps.     No SOB.     Has gastroparesis, IBS  nd h/o severas admission for constiption, colon blockage with impaction and gets bloating. has lost follow up with GI.     She is working with  to get medical assistance to get insurance back by early next year. She filed it again.     Gets botox inj every 3 months nd they help, has to cancel 10/20 botox inj s will not have insurance. they came back yesterday.     last seizure waas last year. still gets black out spells, salts ne was last week.    derm eye gi     FHx: Both parents have RA.  SHx: She was working in a [employer] shop, it was closed because of pandemic. sexually active, not on oral contraceptives. She thinks she had a misccraaaiaage last wk, had n period x2 months then mueller bleeding a lot, dark red and was different from period. Felt something came out that she feels she miscarried, no pos pregnancy test. No smoking. rarely drinks ETOH.  [facility]   thumbs between MCP tender   otezla helped with  skin lesions, oral ulcers, joint pain.  colcrys  sjogre aleve ariene mago [id number]        8/11/2022: Samara presents for urgent visit to discuss m/o Behcet's flare during pregnancy, she is   She is pregnant 10 wk 3 days with ADRIAN 3/6/2023.  In 2020, had 1st trimester miscarriage.  Has LBP, ankle pain/swelling.   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 Discussed with MFM, back on low dose otezla since last week, only 1 tab a day.    Interval History  10/28/2022  Mrs. Oropeza was last seen 08/11/22. At that time she was advised to continue otezla (1 tablet BID) and remain off colcrys. Since that time, she has instead taken otezla 1 tablet daily. She notes more joint pain in hands, wrists, cervical spine, knees with 4 hours of monring stiffness. Previously <1hr with full strength.     Had a few genital ulcers recently which only lasted a few days. Oral ulcers have been more active of late and are currently healing.     Abdominal pain on L side, pain is always worse after eating. Out of the phase of her pregnancy with morning sickness, at 22w on Monday.     She describes a few fevers at the end of September with hot flashes and cold sweats with Tmax 100. Called OB-GYN who instructed she should go in if persistent, though it resolved within a few days.     More easy bruising - thighs, she is currently on lovenox and baby aspirin. Previous history of clots especially with interventions. She recently had an issue with superficial thrombophlebitis following a blood draw.     Seeing more floaters in her vision. Sometimes tiny and clear, but can also be larger and 'gray', up to 1/4 of her vision. Has not seen Ophthalmology in many years.     More frequent migraines - did a nerve block recently but this has worn off, previous botox injections.     ADRIAN 03/06/23.    1/25/23:    Baby is growing small. Had high BP yesterday, they would monitor it. DBP was  90.    Increasing otezla to bid helped. It helped with ulcers. Has skin breakdown since Christmas, never had it like this over her chest, but still it feels related to Behcet.    Hands and ankles are painful and swoleln, ankles are new but had hand pain with behcet before,. This started fater entering [address] [place] [address]. Her ADRIAN might be earlier based on baby's growth, induction at 37 wk, progress at 39. nex twk will have another check.    Still has the neck pain. Still gets eye floaters.    Today 6/21/2023:    She delivered 1 month early due to pre-eclampsia. Had Mg infusion, had bleeding issues, spent a week. Was on BP med till a month ago, now stable. Was induced, had NVD. No blood transfusions needed.    Her baby girl Lashanda was born 2/12/2023, small, slow growth but healthy with no fetal deformities. Had vaginal sores after giving birth. Still gets mouth ulcers, one oral ulcer now, no vaginal ulcers now. Plans to breast feed x 7 months. Does breastfeeding. Saw OB[facility] in 4/2023 at [facility]. They are aware she is on otezla.    She was on ASA 81 mg every day during pregnancy.        Review of Systems    A comprehensive ROS was done. Positives are per HPI.    Past Medical History   Past Medical History:   Diagnosis Date    Anemia     Anxiety     Arthritis     Behcet's disease (H)     Cervical adenitis May 2010    Chronic abdominal pain     Constipation, chronic 1994    Fibromyalgia     Gastro-oesophageal reflux disease     Gastroparesis     Irregular heart beat     tachycardia, has had workup    Migraines     Neuromuscular disorder (H)     fibramyalgia    Palpitations     PONV (postoperative nausea and vomiting)     Seizure (H)     Seizures (H)     unknown etiology    Syncope     Tourette's       Past Surgical History:   Procedure Laterality Date    ARTHROSCOPY ANKLE, OPEN REPAIR LIGAMENT, COMBINED Left 9/25/2019    Procedure: LEFT ANKLE ARTHROSCOPY WITH LIGAMENT REPAIR;  Surgeon: Andres Johnson DPM;   Location: [facility]    ARTHROSCOPY ANKLE, REPAIR LIGAMENT Left 1/2/2019    Procedure: Ankle arthroscopy and sinus tarsi evacuation, ligament repair, left lower extremity;  Surgeon: Andres Johnson DPM;  Location: [facility] OR    ARTHROSCOPY KNEE WITH PATELLAR REALIGNMENT  7/25/2013    Procedure: ARTHROSCOPY KNEE WITH PATELLAR REALIGNMENT;  Left Knee Arthroscopy, Medial Patellofemoral Ligament Reconstruction with Allograft  ;  Surgeon: Jennifer Acevedo MD;  Location: [facility]    COLONOSCOPY  2015    DENTAL SURGERY  1996    Teeth removal    ENDOSCOPY UPPER, COLONOSCOPY, COMBINED  2005    HC ESOPH/GAS REFLUX TEST W NASAL IMPED >1 HR N/A 2/15/2017    Procedure: ESOPHAGEAL IMPEDENCE FUNCTION TEST WITH 24 HOUR PH GREATER THAN 1 HOUR;  Surgeon: Timothy Matta MD;  Location: [facility]    IR PICC PLACEMENT > 5 YRS OF AGE  3/13/2019    IR UPPER EXTREMITY VENOGRAM LEFT  2/25/2020    IRRIGATION AND DEBRIDEMENT FOOT, COMBINED Left 3/12/2019    Procedure: COMBINED IRRIGATION AND DEBRIDEMENT LEFT ANKLE;  Surgeon: Micha Glover MD;  Location: [facility]    IRRIGATION AND DEBRIDEMENT LOWER EXTREMITY, COMBINED Left 5/7/2019    Procedure: 1.  Excision of wound down to and including deep fascia, less than 20 cm2.  2.  Irrigation and debridement, left ankle.;  Surgeon: Andres Johnson DPM;  Location: [facility] OR    PICC INSERTION Left 05/05/2019    4Fr - 45cm (5cm external), Basilic vein, SVC RA junction      Patient Active Problem List    Diagnosis Date Noted    Preeclampsia 02/10/2023     Priority: Medium    Infection of joint of ankle (H) 05/06/2019     Priority: Medium    Cellulitis 03/09/2019     Priority: Medium    Displacement of lumbar intervertebral disc without myelopathy 11/13/2018     Priority: Medium     Overview:   Created by Conversion      Iron deficiency associated with nonfamilial restless legs syndrome 11/13/2018     Priority: Medium    Enthesopathy of hip region 11/13/2018     Priority: Medium     Overview:    Created by Conversion      Tourette's syndrome 11/13/2018     Priority: Medium     Overview:   Created by Conversion      Somatic symptom disorder 06/01/2018     Priority: Medium    Pelvic floor weakness 04/25/2018     Priority: Medium    Spells of decreased attentiveness 12/19/2017     Priority: Medium    Mobile cecum 11/09/2017     Priority: Medium     Cecum noted in Right lower quadrant on 4/17 CT scan, and in Left upper Quadrant on CT on 11/2017.      Vitamin D deficiency 10/11/2017     Priority: Medium     How low, unknown/not found. On D when tested at 27. Starting cholecalciferol October 2017. Needs recheck.      Convulsions, unspecified convulsion type (H) 10/03/2017     Priority: Medium    Transient alteration of awareness 10/03/2017     Priority: Medium    Chronic pain syndrome 07/27/2017     Priority: Medium    Major depressive disorder, recurrent episode, moderate (H) 06/27/2017     Priority: Medium    Cervical pain 05/02/2017     Priority: Medium    Acute left ankle pain 03/31/2017     Priority: Medium    Cervical dystonia 03/28/2017     Priority: Medium    PTSD (post-traumatic stress disorder) 01/17/2017     Priority: Medium    Patellofemoral instability 10/20/2016     Priority: Medium    Fibromyalgia 08/04/2016     Priority: Medium    Rheumatoid arthritis of multiple sites without rheumatoid factor (H) 08/04/2016     Priority: Medium    Raynaud's disease without gangrene 08/04/2016     Priority: Medium    Chronic abdominal pain 08/04/2016     Priority: Medium    Palpitations 01/12/2016     Priority: Medium    On colchicine therapy 10/30/2015     Priority: Medium    Spell of shaking 05/06/2015     Priority: Medium    Migraine 02/04/2015     Priority: Medium    Behcet's disease (H) 12/10/2014     Priority: Medium    Headaches due to old head injury 11/12/2013     Priority: Medium    Milk protein intolerance 10/11/2013     Priority: Medium    Intestinal malabsorption 10/11/2013     Priority: Medium     Concussion 02/13/2013     Priority: Medium     Jan 2013, with prolonged recovery- followed by sports med        Knee pain 01/03/2013     Priority: Medium    Generalized anxiety disorder 06/25/2009     Priority: Medium    Tics - Tourette syndrome 05/18/2009     Priority: Medium     Followed by psychotherapy. Symptoms well managed. Originally diagnosed at [facility] neurology. (Dr. Simpson)          IBS (irritable bowel syndrome) 05/18/2009     Priority: Medium    Allergic rhinitis 05/18/2009     Priority: Medium    GERD (gastroesophageal reflux disease) 01/10/2008     Priority: Medium    Gastroparesis 1994     Priority: Medium      Family History   Problem Relation Age of Onset    Depression Mother     Neurologic Disorder Mother         Migraines, take imitrex injection.  Also in maternal grandmother.      Alcohol/Drug Father     Hypertension Father     Depression Father     Osteoarthritis Father     Cardiovascular Maternal Grandmother     Depression Maternal Grandmother     Hypertension Maternal Grandmother     Alzheimer Disease Maternal Grandmother     Cardiovascular Maternal Grandfather     Hypertension Maternal Grandfather     Depression Maternal Grandfather     Alcohol/Drug Maternal Grandfather     Diabetes Maternal Grandfather     Cardiovascular Paternal Grandmother     Hypertension Paternal Grandmother     Cardiovascular Paternal Grandfather     Hypertension Paternal Grandfather     Glaucoma No family hx of     Macular Degeneration No family hx of       Allergies   Allergen Reactions    Amoxil [Penicillins] Rash     Dad unsure of reaction.    Bee Venom Anaphylaxis    Bioflavonoids Anaphylaxis    Citrus Anaphylaxis     All [place]    Contrast Dye Rash     Contrast Media Ready-Box [facility] [place], 04/09/2014.; Contrast Media Ready-Box [facility] [place], 04/09/2014.  NOTE: this is a contrast media oral with iodine. Premedicate with methylpred standard for IV contrast, request barium contrast for oral contrast.    Iodinated Contrast  Media Hives and Rash     Contrast Media Ready-Box [facility] [place]09[place]2014.; Contrast Media Ready-[facility] [place]09[place]2014.  NOTE: this is a contrast media oral with iodine. Premedicate with methylpred standard for IV contrast, request barium contrast for oral contrast.    Pineapple Anaphylaxis, Difficulty breathing and Rash    Reglan [Metoclopramide] Other (See Comments)     IV dose only, in ER, rapid heart rate.    Ace Inhibitors      Difficulty in breathing and GI upset    Amitiza [Lubiprostone] Nausea and Vomiting    Amoxicillin-Pot Clavulanate     Midazolam Unknown     parent states that when pt takes this medication, she wakes up being very violent .    Midazolam Hcl      Coming out of pelvic exam at age of 5, was kicking and screaming when coming out of the versed.    Other [No Clinical Screening - See Comments]      Bleech/ chest tightness, itchy throat, swollen tongue, hives    Tizanidine Other (See Comments)     Confusion, back pain, photophobia, abdominal pain, shaking, anxious      Adhesive Tape Rash    Azithromycin Hives and Rash    Cephalexin Itching and Rash    Sulfa Antibiotics Rash     Skin scarring      Current Outpatient Medications   Medication Sig Dispense Refill    acetaminophen (TYLENOL) 325 MG tablet Take 650 mg by mouth every 6 hours as needed for mild pain      albuterol (PROAIR HFA/PROVENTIL HFA/VENTOLIN HFA) 108 (90 Base) MCG/ACT inhaler Inhale 2 puffs into the lungs every 6 hours as needed for shortness of breath / dyspnea or wheezing 1 Inhaler 1    amitriptyline (ELAVIL) 25 MG tablet TAKE 1 TABLET BY MOUTH EVERY NIGHT AT BEDTIME 90 tablet 1    apremilast (OTEZLA) 30 MG tablet Take 1 tablet (30 mg) by mouth 2 times daily Hold for signs of infection, and seek medical attention. 60 tablet 5    artificial tears OINT ophthalmic ointment 0.5 inch strip each eye at night 1 Tube 11    benzocaine (TOPICALE XTRA) 20 % GEL Apply as needed locally to mouth or nasal ulcers for pain; 4 times daily as needed 30 g  1    betamethasone valerate (VALISONE) 0.1 % cream Apply topically 2 times daily as needed       bisacodyl (DULCOLAX) 5 MG EC tablet Take 2 tablets (10 mg) by mouth daily as needed for constipation 30 tablet 0    citalopram (CELEXA) 20 MG tablet Take 1 tablet (20 mg) by mouth daily 90 tablet 1    EPINEPHrine (EPIPEN 2-EAMON) 0.3 MG/0.3ML injection Inject 0.3 mLs (0.3 mg) into the muscle as needed for anaphylaxis 0.6 mL 3    fluocinonide (LIDEX) 0.05 % external gel Apply topically 2 times daily 60 g 3    gabapentin (NEURONTIN) 300 MG capsule Take 1 capsule (300 mg) by mouth every morning 60 capsule 1    hydrocortisone, Perianal, (ANUSOL-HC) 2.5 % cream Place rectally 3 times daily as needed for hemorrhoids 30 g 0    hypromellose (GENTEAL) 0.3 % SOLN 1 drop every hour as needed for dry eyes       ibuprofen (ADVIL/MOTRIN) 800 MG tablet Take 1 tablet (800 mg) by mouth every 6 hours as needed for other (cramping) 40 tablet 1    lanolin ointment Apply topically every hour as needed for other (sore nipples) 7 g 3    lidocaine (LMX4) 4 % external cream Apply topically once as needed for mild pain 120 g 1    LINZESS 290 MCG capsule TAKE 1 CAPSULE BY MOUTH EVERY DAY IN THE MORNING BEFORE BREAKFAST 90 capsule 0    mometasone (ELOCON) 0.1 % external ointment Apply topically 2 times daily 45 g 3    polyethylene glycol (MIRALAX/GLYCOLAX) powder Take 1 capful by mouth 3 times daily      Prenatal MV-Min-Fe Fum-FA-DHA (PRENATAL 1 PO)       sodium fluoride 1.1 % CREA Apply 1 Application topically daily      triamcinolone (KENALOG) 0.1 % external ointment Apply twice daily as needed to lesions on the genitals and body. OK to use very sparingly on the face. 454 g 2    benzocaine (AMERICAINE) 20 % external aerosol Apply to perineum four times daily as needed for pain (Patient not taking: Reported on 5/12/2023) 57 g 3    docusate sodium (COLACE) 100 MG capsule Take 1 capsule (100 mg) by mouth daily (Patient not taking: Reported on  5/12/2023) 30 capsule 0    enoxaparin ANTICOAGULANT (LOVENOX) 40 MG/0.4ML syringe Inject 0.4 mLs (40 mg) Subcutaneous every 24 hours (Patient not taking: Reported on 5/12/2023) 16 mL 1    enoxaparin ANTICOAGULANT (LOVENOX) 40 MG/0.4ML syringe  (Patient not taking: Reported on 5/30/2023)      lactulose 20 GM/30ML SOLN Take 30 mLs by mouth 3 times daily as needed (for constipation) (Patient not taking: Reported on 5/12/2023) 300 mL 3    NIFEdipine ER OSMOTIC (ADALAT CC) 60 MG 24 hr tablet Take 1 tablet (60 mg) by mouth every 12 hours (Patient not taking: Reported on 5/12/2023) 90 tablet 1    NIFEdipine ER OSMOTIC (PROCARDIA XL) 30 MG 24 hr tablet Take 2 tablets (60 mg) by mouth daily (Patient not taking: Reported on 2/28/2023) 60 tablet 1    order for DME Equipment being ordered: Trilok brace 8 1/2 left lower extremity (Patient not taking: Reported on 5/12/2023) 1 Device 0    order for DME Equipment being ordered: size 8 1/2 walking boot tall (Patient not taking: Reported on 5/12/2023) 1 Device 0    sucralfate (CARAFATE) 1 GM/10ML suspension Take 10 mLs (1 g) by mouth 4 times daily (Patient not taking: Reported on 5/12/2023) 1200 mL 2           Physical Exam     Not done      There is currently no information documented on the homunculus. Go to the Rheumatology activity and complete the homunculus joint exam.  Joint Exam 06/21/2023     No joint exam has been documented for this visit          Data   Data      10/28/2022   BEAUCHAMP-28 (ESR) --   BEAUCHAMP-28 (CRP) --   Tender (BEAUCHAMP-28) 2 / 28    Swollen (BEAUCHAMP-28) 0 / 28    Provider Global --   Patient Global --   ESR 17 mm/hr   CRP --     No results found for any visits on 06/21/23.  CBC RESULTS: Recent Labs   Lab Test 09/07/20  1147   WBC 4.1   RBC 5.09   HGB 13.0   HCT 43.0   MCV 85   MCH 25.5*   MCHC 30.2*   RDW 15.0        TSH   Date Value Ref Range Status   09/07/2020 0.77 0.40 - 4.00 mU/L Final   03/21/2019 0.53 0.40 - 4.00 mU/L Final   10/30/2018 1.06 0.40 - 4.00 mU/L  Final   11/26/2016 0.87 0.40 - 4.00 mU/L Final     T4 Free   Date Value Ref Range Status   01/31/2007 1.26 0.70 - 1.85 ng/dL Final     Rheumatoid Factor   Date Value Ref Range Status   09/30/2020 <7 <12 IU/mL Final   05/06/2016 <20 <20 IU/mL Final       Reviewed Rheumatology lab flowsheet      Dominga Sosa MD

## 2023-06-22 ENCOUNTER — TELEPHONE (OUTPATIENT)
Dept: RHEUMATOLOGY | Facility: CLINIC | Age: 28
End: 2023-06-22
Payer: COMMERCIAL

## 2023-06-22 NOTE — TELEPHONE ENCOUNTER
Check out report on [id number] visit states patient should be seen in 6 months for in person appt. Provider does not have in person slots available for scheduling, message sent to provider for FYI and scheduling pool attached for patient to be contacted for follow up scheduling.

## 2023-07-07 NOTE — TELEPHONE ENCOUNTER
There are conflicting notes in 6/21/23 appt notes-one spot says pt will need in-person appt in 6 months, another spots says she will need virtual visit scheduled in 3-4 months.   Pt now has video visit scheduled with SHYLA Sosa 9/27/23, will talk to her about any future appts at that time.

## 2023-08-07 NOTE — TELEPHONE ENCOUNTER
[facility] pharmacy is calling with a question about Pt's Otezla Rx sent on 3/31/17. Pharmacy states the manufacture is advising that tablet not be split in half. Please contact the pharmacy at [phone].   None

## 2023-08-24 NOTE — TELEPHONE ENCOUNTER
I called and left a voicemail including my call back number.  I called to see how she was doing and get her set up for f/up with Dr Clarke.  ERNESTO Farrar, RN, BSN  Interventional Radiology Nurse Coordinator   Phone:  [phone]     Libtayo Pregnancy And Lactation Text: This medication is contraindicated in pregnancy and when breast feeding.

## 2023-08-30 ENCOUNTER — OFFICE VISIT (OUTPATIENT)
Dept: DERMATOLOGY | Facility: CLINIC | Age: 28
End: 2023-08-30
Attending: DERMATOLOGY
Payer: COMMERCIAL

## 2023-08-30 DIAGNOSIS — L90.5 SCARRING: ICD-10-CM

## 2023-08-30 DIAGNOSIS — L81.0 POSTINFLAMMATORY HYPERPIGMENTATION: ICD-10-CM

## 2023-08-30 PROCEDURE — 99213 OFFICE O/P EST LOW 20 MIN: CPT | Performed by: DERMATOLOGY

## 2023-08-30 RX ORDER — AZELAIC ACID 0.15 G/G
GEL TOPICAL
Qty: 50 G | Refills: 5 | Status: SHIPPED | OUTPATIENT
Start: 2023-08-30

## 2023-08-30 ASSESSMENT — PAIN SCALES - GENERAL: PAINLEVEL: NO PAIN (0)

## 2023-08-30 NOTE — LETTER
8/30/2023       RE: Samara Oropeza  [address]  [facility] [address]     Dear Colleague,    Thank you for referring your patient, Samara Oropeza, to the [facility] DERMATOLOGY [facility] [place] at [facility]. Please see a copy of my visit note below.    [facility] Dermatology Note  Encounter Date: Aug 30, 2023  Office Visit     Dermatology Problem List:  1. Behcet's syndrome   - Diagnosed 2014, primarily managed by rheumatology  - Periodic painful oral/genital (scarring) ulcers, folliculitis, hx positive pathergy test, prior success with apremilast (holding for pregnancy)  - Current: apremilast 30 mg BID per rheum, mometasone ointment, fluocinonide gel  - Prior: colchicine (holding for pregnancy), adalimumab (ineffective), triamcinolone paste for oral lesions, triamcinolone 0.1% oint BID PRN for genital/body lesions  - Future: certolizumab        ____________________________________________     Assessment & Plan:      1. Behcet's with dyspigmentation, possibly PIH: oral and genital     - apremilast 30 mg BID - not addressed today but kept here for safety       2. Hyperpigmented scar above the umbilicu and upper chest and back  - Recommended ScarAway silicone.  - Start azelaic acid 15% daily. Once daily to scars, upper chest and small portion of back and spot on belly button. Hold if irritating. Okay to also use on face, hold for irritation.     Procedures Performed:   None.    Follow-up: 3-12 months in cosmetic derm    Staff and Scribe:     Scribe Disclosure:   I, William Cat, am serving as a scribe to document services personally performed by this physician, Dr. Valeria Leung, based on data collection and the provider's statements to me.       Provider Disclosure:   The documentation recorded by the scribe accurately reflects the services I personally performed and the decisions made by me.    Valeria  MD Madhu    Department of Dermatology  [facility]: Phone: [phone], Fax:[phone]  [facility]: Phone: [phone], Fax: [phone]   ____________________________________________    CC: Derm Problem (Samara is here for hyperpigmentation of a scar on her face, stomach, and back. )    HPI:  Ms. Samara Oropeza is a(n) 28 year old female who presents today as a return patient for postinflammatory hyperpigmentation and scarring.     Last seen 5/12/23 by Dr. Tracey for Behcet's syndrome. At that time, pt instructed to continue apremilast 30 mg twice daily.     Today, she reports she recently had a baby and her Behcet's flared. Reports some skin darkening on the back and chest, denies any ance. She was using mometasone.     Patient is otherwise feeling well, without additional skin concerns.    Labs Reviewed:  N/A    Physical Exam:  Vitals: There were no vitals taken for this visit.  SKIN: Focused examination of upper chest, upper back. was performed  - Hyperpigmented macules up to 5 mm on the upper chest and back    - hyperpigmented scar quarter sized above the umbilicus.   - No other lesions of concern on areas examined.     Medications:  Current Outpatient Medications   Medication    azelaic acid (FINACIA) 15 % external gel    albuterol (PROAIR HFA/PROVENTIL HFA/VENTOLIN HFA) 108 (90 Base) MCG/ACT inhaler    amitriptyline (ELAVIL) 25 MG tablet    apremilast (OTEZLA) 30 MG tablet    artificial tears OINT ophthalmic ointment    benzocaine (AMERICAINE) 20 % external aerosol    benzocaine (TOPICALE XTRA) 20 % GEL    betamethasone valerate (VALISONE) 0.1 % cream    bisacodyl (DULCOLAX) 5 MG EC tablet    celecoxib (CELEBREX) 100 MG capsule    citalopram (CELEXA) 20 MG tablet    EPINEPHrine (EPIPEN 2-EAMON) 0.3 MG/0.3ML injection    etonogestrel (NEXPLANON) 68 MG IMPL    fluocinonide  (LIDEX) 0.05 % external gel    gabapentin (NEURONTIN) 300 MG capsule    hydrocortisone, Perianal, (ANUSOL-HC) 2.5 % cream    hydrOXYzine HCl (ATARAX) 25 MG tablet    hypromellose (GENTEAL) 0.3 % SOLN    lactulose 20 GM/30ML SOLN    lanolin ointment    lidocaine (LMX4) 4 % external cream    LINZESS 290 MCG capsule    mometasone (ELOCON) 0.1 % external ointment    ondansetron (ZOFRAN ODT) 8 MG ODT tab    polyethylene glycol (MIRALAX/GLYCOLAX) powder    Prenatal MV-Min-Fe Fum-FA-DHA (PRENATAL 1 PO)    rizatriptan (MAXALT-MLT) 5 MG ODT    sodium fluoride 1.1 % CREA    sucralfate (CARAFATE) 1 GM/10ML suspension    triamcinolone (KENALOG) 0.1 % external ointment     Current Facility-Administered Medications   Medication    botulinum toxin type A (BOTOX) 100 units injection 200 Units    triamcinolone (KENALOG-40) injection 40 mg    triamcinolone (KENALOG-40) injection 40 mg    triamcinolone (KENALOG-40) injection 40 mg      Past Medical History:   Patient Active Problem List   Diagnosis    Tics - Tourette syndrome    IBS (irritable bowel syndrome)    Allergic rhinitis    Generalized anxiety disorder    Knee pain    Concussion    Milk protein intolerance    Headaches due to old head injury    Behcet's disease (H)    Migraine    Spell of shaking    On colchicine therapy    Palpitations    Fibromyalgia    Rheumatoid arthritis of multiple sites without rheumatoid factor (H)    Raynaud's disease without gangrene    Chronic abdominal pain    Patellofemoral instability    PTSD (post-traumatic stress disorder)    Cervical dystonia    Acute left ankle pain    Cervical pain    Major depressive disorder, recurrent episode, moderate (H)    Chronic pain syndrome    Convulsions, unspecified convulsion type (H)    Transient alteration of awareness    Vitamin D deficiency    Mobile cecum (H28)    Spells of decreased attentiveness    Pelvic floor weakness    Somatic symptom disorder    Gastroparesis    GERD (gastroesophageal reflux  disease)    Displacement of lumbar intervertebral disc without myelopathy    Intestinal malabsorption    Iron deficiency associated with nonfamilial restless legs syndrome    Enthesopathy of hip region    Tourette's syndrome    Cellulitis    Infection of joint of ankle (H)    Preeclampsia     Past Medical History:   Diagnosis Date    Anemia     Anxiety     Arthritis     Behcet's disease (H)     Cervical adenitis May 2010    Chronic abdominal pain     Constipation, chronic 1994    Fibromyalgia     Gastro-oesophageal reflux disease     Gastroparesis     Irregular heart beat     tachycardia, has had workup    Migraines     Neuromuscular disorder (H)     fibramyalgia    Palpitations     PONV (postoperative nausea and vomiting)     Seizure (H)     Seizures (H)     unknown etiology    Syncope     Tourette's         CC Cornell Tracey MD  [address] [place] [address] on close of this encounter.

## 2023-08-30 NOTE — PATIENT INSTRUCTIONS
I recommend trying ScarAway for the dark scar near your belly button. It is available at [facility] and most major pharmacies, or online on [employer].

## 2023-08-30 NOTE — NURSING NOTE
Dermatology Rooming Note    Samara Oropeza's goals for this visit include:   Chief Complaint   Patient presents with    Derm Problem     Samara is here for hyperpigmentation of a scar on her face, stomach, and back.      Chikis Hartman, visit facilitator

## 2023-08-30 NOTE — PROGRESS NOTES
[facility] Dermatology Note  Encounter Date: Aug 30, 2023  Office Visit     Dermatology Problem List:  1. Behcet's syndrome   - Diagnosed 2014, primarily managed by rheumatology  - Periodic painful oral/genital (scarring) ulcers, folliculitis, hx positive pathergy test, prior success with apremilast (holding for pregnancy)  - Current: apremilast 30 mg BID per rheum, mometasone ointment, fluocinonide gel  - Prior: colchicine (holding for pregnancy), adalimumab (ineffective), triamcinolone paste for oral lesions, triamcinolone 0.1% oint BID PRN for genital/body lesions  - Future: certolizumab        ____________________________________________     Assessment & Plan:      1. Behcet's with dyspigmentation, possibly PIH: oral and genital     - apremilast 30 mg BID - not addressed today but kept here for safety       2. Hyperpigmented scar above the umbilicu and upper chest and back  - Recommended ScarAway silicone.  - Start azelaic acid 15% daily. Once daily to scars, upper chest and small portion of back and spot on belly button. Hold if irritating. Okay to also use on face, hold for irritation.     Procedures Performed:   None.    Follow-up: 3-12 months in cosmetic derm    Staff and Scribe:     Scribe Disclosure:   I, William Cat, am serving as a scribe to document services personally performed by this physician, Dr. Valeria Leung, based on data collection and the provider's statements to me.       Provider Disclosure:   The documentation recorded by the scribe accurately reflects the services I personally performed and the decisions made by me.    Valeria Leung MD    Department of Dermatology  [facility]: Phone: [phone], Fax:[phone]  [facility]: Phone: [phone], Fax: [phone]   ____________________________________________    CC: Derm Problem (Samara is  here for hyperpigmentation of a scar on her face, stomach, and back. )    HPI:  Ms. Samara Oropeza is a(n) 28 year old female who presents today as a return patient for postinflammatory hyperpigmentation and scarring.     Last seen 5/12/23 by Dr. Tracey for Behcet's syndrome. At that time, pt instructed to continue apremilast 30 mg twice daily.     Today, she reports she recently had a baby and her Behcet's flared. Reports some skin darkening on the back and chest, denies any ance. She was using mometasone.     Patient is otherwise feeling well, without additional skin concerns.    Labs Reviewed:  N/A    Physical Exam:  Vitals: There were no vitals taken for this visit.  SKIN: Focused examination of upper chest, upper back. was performed  - Hyperpigmented macules up to 5 mm on the upper chest and back    - hyperpigmented scar quarter sized above the umbilicus.   - No other lesions of concern on areas examined.     Medications:  Current Outpatient Medications   Medication    azelaic acid (FINACIA) 15 % external gel    albuterol (PROAIR HFA/PROVENTIL HFA/VENTOLIN HFA) 108 (90 Base) MCG/ACT inhaler    amitriptyline (ELAVIL) 25 MG tablet    apremilast (OTEZLA) 30 MG tablet    artificial tears OINT ophthalmic ointment    benzocaine (AMERICAINE) 20 % external aerosol    benzocaine (TOPICALE XTRA) 20 % GEL    betamethasone valerate (VALISONE) 0.1 % cream    bisacodyl (DULCOLAX) 5 MG EC tablet    celecoxib (CELEBREX) 100 MG capsule    citalopram (CELEXA) 20 MG tablet    EPINEPHrine (EPIPEN 2-EAMON) 0.3 MG/0.3ML injection    etonogestrel (NEXPLANON) 68 MG IMPL    fluocinonide (LIDEX) 0.05 % external gel    gabapentin (NEURONTIN) 300 MG capsule    hydrocortisone, Perianal, (ANUSOL-HC) 2.5 % cream    hydrOXYzine HCl (ATARAX) 25 MG tablet    hypromellose (GENTEAL) 0.3 % SOLN    lactulose 20 GM/30ML SOLN    lanolin ointment    lidocaine (LMX4) 4 % external cream    LINZESS 290 MCG capsule    mometasone (ELOCON) 0.1 %  external ointment    ondansetron (ZOFRAN ODT) 8 MG ODT tab    polyethylene glycol (MIRALAX/GLYCOLAX) powder    Prenatal MV-Min-Fe Fum-FA-DHA (PRENATAL 1 PO)    rizatriptan (MAXALT-MLT) 5 MG ODT    sodium fluoride 1.1 % CREA    sucralfate (CARAFATE) 1 GM/10ML suspension    triamcinolone (KENALOG) 0.1 % external ointment     Current Facility-Administered Medications   Medication    botulinum toxin type A (BOTOX) 100 units injection 200 Units    triamcinolone (KENALOG-40) injection 40 mg    triamcinolone (KENALOG-40) injection 40 mg    triamcinolone (KENALOG-40) injection 40 mg      Past Medical History:   Patient Active Problem List   Diagnosis    Tics - Tourette syndrome    IBS (irritable bowel syndrome)    Allergic rhinitis    Generalized anxiety disorder    Knee pain    Concussion    Milk protein intolerance    Headaches due to old head injury    Behcet's disease (H)    Migraine    Spell of shaking    On colchicine therapy    Palpitations    Fibromyalgia    Rheumatoid arthritis of multiple sites without rheumatoid factor (H)    Raynaud's disease without gangrene    Chronic abdominal pain    Patellofemoral instability    PTSD (post-traumatic stress disorder)    Cervical dystonia    Acute left ankle pain    Cervical pain    Major depressive disorder, recurrent episode, moderate (H)    Chronic pain syndrome    Convulsions, unspecified convulsion type (H)    Transient alteration of awareness    Vitamin D deficiency    Mobile cecum (H28)    Spells of decreased attentiveness    Pelvic floor weakness    Somatic symptom disorder    Gastroparesis    GERD (gastroesophageal reflux disease)    Displacement of lumbar intervertebral disc without myelopathy    Intestinal malabsorption    Iron deficiency associated with nonfamilial restless legs syndrome    Enthesopathy of hip region    Tourette's syndrome    Cellulitis    Infection of joint of ankle (H)    Preeclampsia     Past Medical History:   Diagnosis Date    Anemia      Anxiety     Arthritis     Behcet's disease (H)     Cervical adenitis May 2010    Chronic abdominal pain     Constipation, chronic 1994    Fibromyalgia     Gastro-oesophageal reflux disease     Gastroparesis     Irregular heart beat     tachycardia, has had workup    Migraines     Neuromuscular disorder (H)     fibramyalgia    Palpitations     PONV (postoperative nausea and vomiting)     Seizure (H)     Seizures (H)     unknown etiology    Syncope     Tourette's         CC Cornell Tracey MD  [address] [place] [address] on close of this encounter.

## 2023-09-08 ENCOUNTER — TELEPHONE (OUTPATIENT)
Dept: PHYSICAL MEDICINE AND REHAB | Facility: CLINIC | Age: 28
End: 2023-09-08
Payer: COMMERCIAL

## 2023-09-08 NOTE — TELEPHONE ENCOUNTER
[facility] Call Center    Phone Message    May a detailed message be left on voicemail: yes     Reason for Call: Other: Cristopher is calling and stating that he sees that Pt has injection next week and has PT the day after and they would like to know if there are any restrictions? Please call Cristopher back to discuss.     Action Taken: Message routed to:  Other: [facility]    Travel Screening: Not Applicable

## 2023-09-11 NOTE — ED AVS SNAPSHOT
[facility] [place], Emergency Department    [address] [place] [address]    Phone:  [phone]                                       Samara Oropeza   MRN: [id number]    Department:  [facility], Emergency Department   Date of Visit:  12/3/2018           After Visit Summary Signature Page     I have received my discharge instructions, and my questions have been answered. I have discussed any challenges I see with this plan with the nurse or doctor.    ..........................................................................................................................................  Patient/Patient Representative Signature      ..........................................................................................................................................  Patient Representative Print Name and Relationship to Patient    ..................................................               ................................................  Date                                   Time    ..........................................................................................................................................  Reviewed by Signature/Title    ...................................................              ..............................................  Date                                               Time          22EPIC Rev 08/18         Detail Level: Simple

## 2023-09-12 ENCOUNTER — OFFICE VISIT (OUTPATIENT)
Dept: PHYSICAL MEDICINE AND REHAB | Facility: CLINIC | Age: 28
End: 2023-09-12
Payer: COMMERCIAL

## 2023-09-12 DIAGNOSIS — M54.81 BILATERAL OCCIPITAL NEURALGIA: ICD-10-CM

## 2023-09-12 DIAGNOSIS — G43.719 CHRONIC MIGRAINE WITHOUT AURA, INTRACTABLE, WITHOUT STATUS MIGRAINOSUS: Primary | ICD-10-CM

## 2023-09-12 PROCEDURE — 64405 NJX AA&/STRD GR OCPL NRV: CPT | Mod: XS | Performed by: PHYSICAL MEDICINE & REHABILITATION

## 2023-09-12 PROCEDURE — 95874 GUIDE NERV DESTR NEEDLE EMG: CPT | Performed by: PHYSICAL MEDICINE & REHABILITATION

## 2023-09-12 PROCEDURE — 64615 CHEMODENERV MUSC MIGRAINE: CPT | Performed by: PHYSICAL MEDICINE & REHABILITATION

## 2023-09-12 NOTE — LETTER
9/12/2023         RE: Samara Oropeza  [address]  [place] [facility] [place] [address]        Dear Colleague,    Thank you for referring your patient, Samara Oropeza, to the [facility]. Please see a copy of my visit note below.      [facility]    PM&R CLINIC NOTE  BOTULINUM TOXIN PROCEDURE      HPI  Chief Complaint   Patient presents with     Botox     Nerve Block     Samara Oropeza is a 27 year old female who presents to clinic for botulinum toxin injections for management of chronic migraine headaches.     SINCE LAST VISIT  Samara Oropeza was last seen here in clinic on 5/30/2023, at which time she received 200 units of Botox.     Patient denies new medical diagnoses, illnesses, hospitalizations, emergency room visits, and injuries since the previous injection with botulinum neurotoxin.    RESPONSE TO PREVIOUS TREATMENT    Side effects: No problems reported    1.  Headache frequency during this injection cycle: 1-4 migraine headache days per month when the Botox is in effect, with a higher intensity of migraines over the last month as the Botox has been wearing off. This is compared to her baseline headache frequency of 30 headache days per month.     2.  Headache duration during this injection cycle:  Headache duration was usually a few hours to a few days . Patient reports a few episodes of multiple day headaches during this injection cycle, particularly as the Botox was wearing off.     3.  Headache intensity during this injection cycle:    A.  7/10  =  Typical pain level.  B.  9/10  =  Worst pain level.    C.  0/10  =  Lowest pain level.    4.  Change in headache medication usage during this injection cycle:  (For Example:  Able to decrease use of oral pain medications.) She has been taking Tylenol and ibuprofen as needed for her migraine headaches. She will take rizatriptan if the OTC medications do not work.       5.  ER Visits During This Injection Cycle:  None.     6.  Functional Performance:  Change in ADL's, social interaction, days lost from work, etc. Patient reports being able to more fully participate in social and family activities and responsibilities as headache symptoms have improved      PHYSICAL EXAM  VS: There were no vitals taken for this visit.   GEN: Pleasant and cooperative, in no acute distress  HEENT: No facial asymmetry    ALLERGIES  Allergies   Allergen Reactions     Amoxil [Penicillins] Rash     Dad unsure of reaction.     Bee Venom Anaphylaxis     Bioflavonoids Anaphylaxis     Citrus Anaphylaxis     All [place]     Contrast Dye Rash     Contrast Media Ready-Box [facility], 04/09/2014.; Contrast Media [facility], 04/09/2014.  NOTE: this is a contrast media oral with iodine. Premedicate with methylpred standard for IV contrast, request barium contrast for oral contrast.     Iodinated Contrast Media Hives and Rash     Contrast Media [employer]-[facility], 04/09/2014.; Contrast Media [facility], 04/09/2014.  NOTE: this is a contrast media oral with iodine. Premedicate with methylpred standard for IV contrast, request barium contrast for oral contrast.     Pineapple Anaphylaxis, Difficulty breathing and Rash     Reglan [Metoclopramide] Other (See Comments)     IV dose only, in ER, rapid heart rate.     Ace Inhibitors      Difficulty in breathing and GI upset     Amitiza [Lubiprostone] Nausea and Vomiting     Amoxicillin-Pot Clavulanate      Midazolam Unknown     parent states that when pt takes this medication, she wakes up being very violent .     Midazolam Hcl      Coming out of pelvic exam at age of 5, was kicking and screaming when coming out of the versed.     Other [No Clinical Screening - See Comments]      Bleech/ chest tightness, itchy throat, swollen tongue, hives     Tizanidine Other (See Comments)     Confusion, back pain, photophobia, abdominal pain, shaking, anxious       Adhesive Tape Rash      Azithromycin Hives and Rash     Cephalexin Itching and Rash     Sulfa Antibiotics Rash     Skin scarring       CURRENT MEDICATIONS    Current Outpatient Medications:      acetaminophen (TYLENOL) 325 MG tablet, Take 650 mg by mouth every 6 hours as needed for mild pain, Disp: , Rfl:      albuterol (PROAIR HFA/PROVENTIL HFA/VENTOLIN HFA) 108 (90 Base) MCG/ACT inhaler, Inhale 2 puffs into the lungs every 6 hours as needed for shortness of breath / dyspnea or wheezing, Disp: 1 Inhaler, Rfl: 1     amitriptyline (ELAVIL) 25 MG tablet, TAKE 1 TABLET BY MOUTH EVERY NIGHT AT BEDTIME, Disp: 90 tablet, Rfl: 1     apremilast (OTEZLA) 30 MG tablet, Take 1 tablet (30 mg) by mouth 2 times daily Hold for signs of infection, and seek medical attention., Disp: 60 tablet, Rfl: 5     artificial tears OINT ophthalmic ointment, 0.5 inch strip each eye at night, Disp: 1 Tube, Rfl: 11     azelaic acid (FINACIA) 15 % external gel, Once daily to scars, upper chest and small portion of back and spot on belly button. Hold if irritating, Disp: 50 g, Rfl: 5     benzocaine (AMERICAINE) 20 % external aerosol, Apply to perineum four times daily as needed for pain (Patient not taking: Reported on 5/12/2023), Disp: 57 g, Rfl: 3     benzocaine (TOPICALE XTRA) 20 % GEL, Apply as needed locally to mouth or nasal ulcers for pain; 4 times daily as needed, Disp: 30 g, Rfl: 1     betamethasone valerate (VALISONE) 0.1 % cream, Apply topically 2 times daily as needed , Disp: , Rfl:      bisacodyl (DULCOLAX) 5 MG EC tablet, Take 2 tablets (10 mg) by mouth daily as needed for constipation, Disp: 30 tablet, Rfl: 0     citalopram (CELEXA) 20 MG tablet, Take 1 tablet (20 mg) by mouth daily, Disp: 90 tablet, Rfl: 1     docusate sodium (COLACE) 100 MG capsule, Take 1 capsule (100 mg) by mouth daily (Patient not taking: Reported on 5/12/2023), Disp: 30 capsule, Rfl: 0     enoxaparin ANTICOAGULANT (LOVENOX) 40 MG/0.4ML syringe, Inject 0.4 mLs (40 mg) Subcutaneous  every 24 hours (Patient not taking: Reported on 5/12/2023), Disp: 16 mL, Rfl: 1     enoxaparin ANTICOAGULANT (LOVENOX) 40 MG/0.4ML syringe, , Disp: , Rfl:      EPINEPHrine (EPIPEN 2-EAMON) 0.3 MG/0.3ML injection, Inject 0.3 mLs (0.3 mg) into the muscle as needed for anaphylaxis, Disp: 0.6 mL, Rfl: 3     fluocinonide (LIDEX) 0.05 % external gel, Apply topically 2 times daily, Disp: 60 g, Rfl: 3     gabapentin (NEURONTIN) 300 MG capsule, Take 1 capsule (300 mg) by mouth every morning, Disp: 60 capsule, Rfl: 1     hydrocortisone, Perianal, (ANUSOL-HC) 2.5 % cream, Place rectally 3 times daily as needed for hemorrhoids, Disp: 30 g, Rfl: 0     hypromellose (GENTEAL) 0.3 % SOLN, 1 drop every hour as needed for dry eyes , Disp: , Rfl:      ibuprofen (ADVIL/MOTRIN) 800 MG tablet, Take 1 tablet (800 mg) by mouth every 6 hours as needed for other (cramping), Disp: 40 tablet, Rfl: 1     lactulose 20 GM/30ML SOLN, Take 30 mLs by mouth 3 times daily as needed (for constipation) (Patient not taking: Reported on 5/12/2023), Disp: 300 mL, Rfl: 3     lanolin ointment, Apply topically every hour as needed for other (sore nipples), Disp: 7 g, Rfl: 3     lidocaine (LMX4) 4 % external cream, Apply topically once as needed for mild pain, Disp: 120 g, Rfl: 1     LINZESS 290 MCG capsule, TAKE 1 CAPSULE BY MOUTH EVERY DAY IN THE MORNING BEFORE BREAKFAST, Disp: 90 capsule, Rfl: 0     mometasone (ELOCON) 0.1 % external ointment, Apply topically 2 times daily, Disp: 45 g, Rfl: 3     NIFEdipine ER OSMOTIC (ADALAT CC) 60 MG 24 hr tablet, Take 1 tablet (60 mg) by mouth every 12 hours (Patient not taking: Reported on 5/12/2023), Disp: 90 tablet, Rfl: 1     NIFEdipine ER OSMOTIC (PROCARDIA XL) 30 MG 24 hr tablet, Take 2 tablets (60 mg) by mouth daily (Patient not taking: Reported on 2/28/2023), Disp: 60 tablet, Rfl: 1     order for DME, Equipment being ordered: Trilok brace 8 1/2 left lower extremity (Patient not taking: Reported on 5/12/2023), Disp:  1 Device, Rfl: 0     order for DME, Equipment being ordered: size 8 1/2 walking boot tall (Patient not taking: Reported on 2023), Disp: 1 Device, Rfl: 0     polyethylene glycol (MIRALAX/GLYCOLAX) powder, Take 1 capful by mouth 3 times daily, Disp: , Rfl:      Prenatal MV-Min-Fe Fum-FA-DHA (PRENATAL 1 PO), , Disp: , Rfl:      sodium fluoride 1.1 % CREA, Apply 1 Application topically daily, Disp: , Rfl:      sucralfate (CARAFATE) 1 GM/10ML suspension, Take 10 mLs (1 g) by mouth 4 times daily (Patient not taking: Reported on 2023), Disp: 1200 mL, Rfl: 2     triamcinolone (KENALOG) 0.1 % external ointment, Apply twice daily as needed to lesions on the genitals and body. OK to use very sparingly on the face., Disp: 454 g, Rfl: 2    Current Facility-Administered Medications:      botulinum toxin type A (BOTOX) 100 units injection 200 Units, 200 Units, Intramuscular, Q90 Days, Standal, Alejandrina Vera MD, 200 Units at 23 1525     triamcinolone (KENALOG-40) injection 40 mg, 40 mg, , , Fleischmann, Andres, DPM, 40 mg at 10/11/21 0928     triamcinolone (KENALOG-40) injection 40 mg, 40 mg, , , Fleischmann, Andres, DPM, 40 mg at 06/15/21 0957     triamcinolone (KENALOG-40) injection 40 mg, 40 mg, , , Fleischmann, Andres, DPM, 40 mg at 09/15/20 1554       BOTULINUM NEUROTOXIN INJECTION PROCEDURES    VERIFICATION OF PATIENT IDENTIFICATION AND PROCEDURE     Initials   Patient Name SES   Patient  SES   Procedure Verified by: SES     Prior to the start of the procedure and with procedural staff participation, I verbally confirmed the patient s identity using two indicators, relevant allergies, that the procedure was appropriate and matched the consent or emergent situation, and that the correct equipment/implants were available. Immediately prior to starting the procedure I conducted the Time Out with the procedural staff and re-confirmed the patient s name, procedure, and site/side. (The Joint Commission  universal protocol was followed.)  Yes    Sedation (Moderate or Deep): None    ABOVE ASSESSMENTS PERFORMED BY    Alejandrina Hernandez MD      INDICATIONS FOR PROCEDURES  Samara Oropeza is a 27 year old patient with pain secondary to the diagnosis of chronic migraine headaches. Her baseline symptoms have been recalcitrant to oral medications and conservative therapy.  She is here today for reinjection with Botox.    GOAL OF PROCEDURE  The goal of this procedure is to decrease pain.      TOTAL DOSE ADMINISTERED  Dose Administered:  200 units  Botox (Botulinum Toxin Type A)       2:1 Dilution   Unavoidable Drug Waste: No.  Diluent Used:  Preservative Free Normal Saline  Total Volume of Diluent Used:  4 ml  NDC #: Botox 100u ([address]-74)      CONSENT  The risks, benefits, and treatment options were discussed with Samara Oropeza and she agreed to proceed.    Written consent was obtained by  [facility] .     EQUIPMENT USED  Needle-25mm stimulating/recording  Needle-30 gauge  EMG/NCS Machine    SKIN PREPARATION  Skin preparation was performed using an alcohol wipe.    GUIDANCE DESCRIPTION  Electro-myographic guidance was necessary throughout the neck portion of the procedure to accurately identify all areas of spastic muscles while avoiding injection of non-spastic muscles, neighboring nerves and nearby vascular structures.     AREA/MUSCLE INJECTED:  200 UNITS BOTOX = TOTAL DOSE, 2:1 DILUTION     1 & 2. SHOULDER GIRDLE & NECK MUSCLES: 60 UNITS BOTOX = TOTAL DOSE     Right Splenius Cervicis - 5 units of Botox over 2 site/s.   Left Splenius Cervicis - 5 units of Botox over 2 site/s.     Right Rectus Capitis - 2.5 units of Botox at 1 site.  Left Rectus Capitis - 2.5 units of Botox at 1site.     Right Levator Scapulae - 10 units of Botox over 2 site/s.   Left Levator Scapulae - 10 units of Botox over 2 site/s.    Right Anterior Scalene - 2.5 units of Botox over 1 site/s.   Left Anterior Scalene - 2.5 units of Botox over 1  site/s.     Right Sternocleidomastoid - 2.5 units of Botox over 1 site/s.   Left Sternocleidomastoid - 2.5 units of Botox over 1 site/s.     Right Rhomboid Major - 5 units of Botox over 1 site/s.    Left Rhomboid Major - 5 units of Botox over 1 site/s.      Right Pectoralis Minor - 2.5 units of Botox over 1 site/s.    Left Pectoralis Minor - 2.5 units of Botox over 1 site/s.     3. HEAD & SCALP MUSCLES: 140 UNITS BOTOX = TOTAL DOSE     Right Occipitalis - 5 units of Botox over 1 site/s.   Left Occipitalis - 5 units of Botox over 1 site/s.     Right Frontalis - 10 units of Botox over 2 site/s.  Left Frontalis - 10 units of Botox over 2 site/s.     Right Temporalis - 50 units of Botox over 8 site/s.  Left Temporalis - 50 units of Botox over 8 site/s.     Right  - 2.5 units of Botox over 1 site/s.              Left  - 2.5 units of Botox over 1 site/s.                 Procerus - 5 units of Botox at 1 site/s.      BILATERAL GREATER OCCIPITAL NERVE BLOCKS:  Dru% lidocaine: 9 ml  Methylprednisolone: 40 mg = 1 ml       Area just inferior to insertion of the right and left superior trapezius insertion onto skull was cleansed with ChloraPrep. Needle was advanced anteriorly to base of skull then slightly withdrawn and injectate was injected in a fan-like distribution at different depths. A 10 ml mixture of 1% lidocaine, and methylprednisolone was divided into two 5 ml syringes. Total injection volume = 5 ml per side.       RESPONSE TO PROCEDURE  Samara Oropeza tolerated the procedure well and there were no immediate complications. She was allowed to recover for an appropriate period of time and was discharged home in stable condition.    ASSESSMENT AND PLAN   Botulinum toxin injections: No changes made to Botox dose or distribution today. Occipital nerve blocks also administered per patient request. Patient will continue to monitor response to Botox and report at next appointment.   Referrals:  None.   Follow up: Samara Oropeza was rescheduled for the next series of injections in 12 weeks, at which time we will evaluate response to today's injections. she may call the clinic prior with any questions or concerns prior to the next appointment.       Again, thank you for allowing me to participate in the care of your patient.        Sincerely,        Alejandrina Hernandez MD

## 2023-09-12 NOTE — PROGRESS NOTES
[facility]    PM&R CLINIC NOTE  BOTULINUM TOXIN PROCEDURE      HPI  Chief Complaint   Patient presents with    Botox    Nerve Block     Samara Oropeza is a 27 year old female who presents to clinic for botulinum toxin injections for management of chronic migraine headaches.     SINCE LAST VISIT  Samara Oropeza was last seen here in clinic on 5/30/2023, at which time she received 200 units of Botox.     Patient denies new medical diagnoses, illnesses, hospitalizations, emergency room visits, and injuries since the previous injection with botulinum neurotoxin.    RESPONSE TO PREVIOUS TREATMENT    Side effects: No problems reported    1.  Headache frequency during this injection cycle: 1-4 migraine headache days per month when the Botox is in effect, with a higher intensity of migraines over the last month as the Botox has been wearing off. This is compared to her baseline headache frequency of 30 headache days per month.     2.  Headache duration during this injection cycle:  Headache duration was usually a few hours to a few days . Patient reports a few episodes of multiple day headaches during this injection cycle, particularly as the Botox was wearing off.     3.  Headache intensity during this injection cycle:    A.  7/10  =  Typical pain level.  B.  9/10  =  Worst pain level.    C.  0/10  =  Lowest pain level.    4.  Change in headache medication usage during this injection cycle:  (For Example:  Able to decrease use of oral pain medications.) She has been taking Tylenol and ibuprofen as needed for her migraine headaches. She will take rizatriptan if the OTC medications do not work.      5.  ER Visits During This Injection Cycle:  None.     6.  Functional Performance:  Change in ADL's, social interaction, days lost from work, etc. Patient reports being able to more fully participate in social and family activities and responsibilities as headache symptoms have  improved      PHYSICAL EXAM  VS: There were no vitals taken for this visit.   GEN: Pleasant and cooperative, in no acute distress  HEENT: No facial asymmetry    ALLERGIES  Allergies   Allergen Reactions    Amoxil [Penicillins] Rash     Dad unsure of reaction.    Bee Venom Anaphylaxis    Bioflavonoids Anaphylaxis    Citrus Anaphylaxis     All [place]    Contrast Dye Rash     Contrast Media [facility] [place] 04/09/2014.; Contrast [facility] [place], 04/09/2014.  NOTE: this is a contrast media oral with iodine. Premedicate with methylpred standard for IV contrast, request barium contrast for oral contrast.    Iodinated Contrast Media Hives and Rash     Contrast [facility] [place], 04/09/2014.; Contrast [facility] [place], 04/09/2014.  NOTE: this is a contrast media oral with iodine. Premedicate with methylpred standard for IV contrast, request barium contrast for oral contrast.    Pineapple Anaphylaxis, Difficulty breathing and Rash    Reglan [Metoclopramide] Other (See Comments)     IV dose only, in ER, rapid heart rate.    Ace Inhibitors      Difficulty in breathing and GI upset    Amitiza [Lubiprostone] Nausea and Vomiting    Amoxicillin-Pot Clavulanate     Midazolam Unknown     parent states that when pt takes this medication, she wakes up being very violent .    Midazolam Hcl      Coming out of pelvic exam at age of 5, was kicking and screaming when coming out of the versed.    Other [No Clinical Screening - See Comments]      Bleech/ chest tightness, itchy throat, swollen tongue, hives    Tizanidine Other (See Comments)     Confusion, back pain, photophobia, abdominal pain, shaking, anxious      Adhesive Tape Rash    Azithromycin Hives and Rash    Cephalexin Itching and Rash    Sulfa Antibiotics Rash     Skin scarring       CURRENT MEDICATIONS    Current Outpatient Medications:     acetaminophen (TYLENOL) 325 MG tablet, Take 650 mg by mouth every 6 hours as needed for mild pain, Disp: , Rfl:     albuterol  (PROAIR HFA/PROVENTIL HFA/VENTOLIN HFA) 108 (90 Base) MCG/ACT inhaler, Inhale 2 puffs into the lungs every 6 hours as needed for shortness of breath / dyspnea or wheezing, Disp: 1 Inhaler, Rfl: 1    amitriptyline (ELAVIL) 25 MG tablet, TAKE 1 TABLET BY MOUTH EVERY NIGHT AT BEDTIME, Disp: 90 tablet, Rfl: 1    apremilast (OTEZLA) 30 MG tablet, Take 1 tablet (30 mg) by mouth 2 times daily Hold for signs of infection, and seek medical attention., Disp: 60 tablet, Rfl: 5    artificial tears OINT ophthalmic ointment, 0.5 inch strip each eye at night, Disp: 1 Tube, Rfl: 11    azelaic acid (FINACIA) 15 % external gel, Once daily to scars, upper chest and small portion of back and spot on belly button. Hold if irritating, Disp: 50 g, Rfl: 5    benzocaine (AMERICAINE) 20 % external aerosol, Apply to perineum four times daily as needed for pain (Patient not taking: Reported on 5/12/2023), Disp: 57 g, Rfl: 3    benzocaine (TOPICALE XTRA) 20 % GEL, Apply as needed locally to mouth or nasal ulcers for pain; 4 times daily as needed, Disp: 30 g, Rfl: 1    betamethasone valerate (VALISONE) 0.1 % cream, Apply topically 2 times daily as needed , Disp: , Rfl:     bisacodyl (DULCOLAX) 5 MG EC tablet, Take 2 tablets (10 mg) by mouth daily as needed for constipation, Disp: 30 tablet, Rfl: 0    citalopram (CELEXA) 20 MG tablet, Take 1 tablet (20 mg) by mouth daily, Disp: 90 tablet, Rfl: 1    docusate sodium (COLACE) 100 MG capsule, Take 1 capsule (100 mg) by mouth daily (Patient not taking: Reported on 5/12/2023), Disp: 30 capsule, Rfl: 0    enoxaparin ANTICOAGULANT (LOVENOX) 40 MG/0.4ML syringe, Inject 0.4 mLs (40 mg) Subcutaneous every 24 hours (Patient not taking: Reported on 5/12/2023), Disp: 16 mL, Rfl: 1    enoxaparin ANTICOAGULANT (LOVENOX) 40 MG/0.4ML syringe, , Disp: , Rfl:     EPINEPHrine (EPIPEN 2-EAMON) 0.3 MG/0.3ML injection, Inject 0.3 mLs (0.3 mg) into the muscle as needed for anaphylaxis, Disp: 0.6 mL, Rfl: 3    fluocinonide  (LIDEX) 0.05 % external gel, Apply topically 2 times daily, Disp: 60 g, Rfl: 3    gabapentin (NEURONTIN) 300 MG capsule, Take 1 capsule (300 mg) by mouth every morning, Disp: 60 capsule, Rfl: 1    hydrocortisone, Perianal, (ANUSOL-HC) 2.5 % cream, Place rectally 3 times daily as needed for hemorrhoids, Disp: 30 g, Rfl: 0    hypromellose (GENTEAL) 0.3 % SOLN, 1 drop every hour as needed for dry eyes , Disp: , Rfl:     ibuprofen (ADVIL/MOTRIN) 800 MG tablet, Take 1 tablet (800 mg) by mouth every 6 hours as needed for other (cramping), Disp: 40 tablet, Rfl: 1    lactulose 20 GM/30ML SOLN, Take 30 mLs by mouth 3 times daily as needed (for constipation) (Patient not taking: Reported on 5/12/2023), Disp: 300 mL, Rfl: 3    lanolin ointment, Apply topically every hour as needed for other (sore nipples), Disp: 7 g, Rfl: 3    lidocaine (LMX4) 4 % external cream, Apply topically once as needed for mild pain, Disp: 120 g, Rfl: 1    LINZESS 290 MCG capsule, TAKE 1 CAPSULE BY MOUTH EVERY DAY IN THE MORNING BEFORE BREAKFAST, Disp: 90 capsule, Rfl: 0    mometasone (ELOCON) 0.1 % external ointment, Apply topically 2 times daily, Disp: 45 g, Rfl: 3    NIFEdipine ER OSMOTIC (ADALAT CC) 60 MG 24 hr tablet, Take 1 tablet (60 mg) by mouth every 12 hours (Patient not taking: Reported on 5/12/2023), Disp: 90 tablet, Rfl: 1    NIFEdipine ER OSMOTIC (PROCARDIA XL) 30 MG 24 hr tablet, Take 2 tablets (60 mg) by mouth daily (Patient not taking: Reported on 2/28/2023), Disp: 60 tablet, Rfl: 1    order for DME, Equipment being ordered: Trilok brace 8 1/2 left lower extremity (Patient not taking: Reported on 5/12/2023), Disp: 1 Device, Rfl: 0    order for DME, Equipment being ordered: size 8 1/2 walking boot tall (Patient not taking: Reported on 5/12/2023), Disp: 1 Device, Rfl: 0    polyethylene glycol (MIRALAX/GLYCOLAX) powder, Take 1 capful by mouth 3 times daily, Disp: , Rfl:     Prenatal MV-Min-Fe Fum-FA-DHA (PRENATAL 1 PO), , Disp: , Rfl:      sodium fluoride 1.1 % CREA, Apply 1 Application topically daily, Disp: , Rfl:     sucralfate (CARAFATE) 1 GM/10ML suspension, Take 10 mLs (1 g) by mouth 4 times daily (Patient not taking: Reported on 2023), Disp: 1200 mL, Rfl: 2    triamcinolone (KENALOG) 0.1 % external ointment, Apply twice daily as needed to lesions on the genitals and body. OK to use very sparingly on the face., Disp: 454 g, Rfl: 2    Current Facility-Administered Medications:     botulinum toxin type A (BOTOX) 100 units injection 200 Units, 200 Units, Intramuscular, Q90 Days, Alejandrina Hernandez MD, 200 Units at 23 1525    triamcinolone (KENALOG-40) injection 40 mg, 40 mg, , , Fleischmann, Andres, DPM, 40 mg at 10/11/21 0928    triamcinolone (KENALOG-40) injection 40 mg, 40 mg, , , Fleischmann, Andres, DPM, 40 mg at 06/15/21 0957    triamcinolone (KENALOG-40) injection 40 mg, 40 mg, , , Fleischmann, Andres, DPM, 40 mg at 09/15/20 1554       BOTULINUM NEUROTOXIN INJECTION PROCEDURES    VERIFICATION OF PATIENT IDENTIFICATION AND PROCEDURE     Initials   Patient Name SES   Patient  SES   Procedure Verified by: SES     Prior to the start of the procedure and with procedural staff participation, I verbally confirmed the patient s identity using two indicators, relevant allergies, that the procedure was appropriate and matched the consent or emergent situation, and that the correct equipment/implants were available. Immediately prior to starting the procedure I conducted the Time Out with the procedural staff and re-confirmed the patient s name, procedure, and site/side. (The Joint Commission universal protocol was followed.)  Yes    Sedation (Moderate or Deep): None    ABOVE ASSESSMENTS PERFORMED BY    Alejandrina Hernandez MD      INDICATIONS FOR PROCEDURES  Samara Oropeza is a 27 year old patient with pain secondary to the diagnosis of chronic migraine headaches. Her baseline symptoms have been recalcitrant to oral medications  and conservative therapy.  She is here today for reinjection with Botox.    GOAL OF PROCEDURE  The goal of this procedure is to decrease pain.      TOTAL DOSE ADMINISTERED  Dose Administered:  200 units  Botox (Botulinum Toxin Type A)       2:1 Dilution   Unavoidable Drug Waste: No.  Diluent Used:  Preservative Free Normal Saline  Total Volume of Diluent Used:  4 ml  NDC #: Botox 100u ([address]-44)      CONSENT  The risks, benefits, and treatment options were discussed with Samara Oropeza and she agreed to proceed.    Written consent was obtained by  [facility] .     EQUIPMENT USED  Needle-25mm stimulating/recording  Needle-30 gauge  EMG/NCS Machine    SKIN PREPARATION  Skin preparation was performed using an alcohol wipe.    GUIDANCE DESCRIPTION  Electro-myographic guidance was necessary throughout the neck portion of the procedure to accurately identify all areas of spastic muscles while avoiding injection of non-spastic muscles, neighboring nerves and nearby vascular structures.     AREA/MUSCLE INJECTED:  200 UNITS BOTOX = TOTAL DOSE, 2:1 DILUTION     1 & 2. SHOULDER GIRDLE & NECK MUSCLES: 60 UNITS BOTOX = TOTAL DOSE     Right Splenius Cervicis - 5 units of Botox over 2 site/s.   Left Splenius Cervicis - 5 units of Botox over 2 site/s.     Right Rectus Capitis - 2.5 units of Botox at 1 site.  Left Rectus Capitis - 2.5 units of Botox at 1site.     Right Levator Scapulae - 10 units of Botox over 2 site/s.   Left Levator Scapulae - 10 units of Botox over 2 site/s.    Right Anterior Scalene - 2.5 units of Botox over 1 site/s.   Left Anterior Scalene - 2.5 units of Botox over 1 site/s.     Right Sternocleidomastoid - 2.5 units of Botox over 1 site/s.   Left Sternocleidomastoid - 2.5 units of Botox over 1 site/s.     Right Rhomboid Major - 5 units of Botox over 1 site/s.    Left Rhomboid Major - 5 units of Botox over 1 site/s.      Right Pectoralis Minor - 2.5 units of Botox over 1 site/s.    Left Pectoralis Minor -  2.5 units of Botox over 1 site/s.     3. HEAD & SCALP MUSCLES: 140 UNITS BOTOX = TOTAL DOSE     Right Occipitalis - 5 units of Botox over 1 site/s.   Left Occipitalis - 5 units of Botox over 1 site/s.     Right Frontalis - 10 units of Botox over 2 site/s.  Left Frontalis - 10 units of Botox over 2 site/s.     Right Temporalis - 50 units of Botox over 8 site/s.  Left Temporalis - 50 units of Botox over 8 site/s.     Right  - 2.5 units of Botox over 1 site/s.              Left  - 2.5 units of Botox over 1 site/s.                 Procerus - 5 units of Botox at 1 site/s.      BILATERAL GREATER OCCIPITAL NERVE BLOCKS:  Dru% lidocaine: 9 ml  Methylprednisolone: 40 mg = 1 ml       Area just inferior to insertion of the right and left superior trapezius insertion onto skull was cleansed with ChloraPrep. Needle was advanced anteriorly to base of skull then slightly withdrawn and injectate was injected in a fan-like distribution at different depths. A 10 ml mixture of 1% lidocaine, and methylprednisolone was divided into two 5 ml syringes. Total injection volume = 5 ml per side.       RESPONSE TO PROCEDURE  Samara Oropeza tolerated the procedure well and there were no immediate complications. She was allowed to recover for an appropriate period of time and was discharged home in stable condition.    ASSESSMENT AND PLAN   Botulinum toxin injections: No changes made to Botox dose or distribution today. Occipital nerve blocks also administered per patient request. Patient will continue to monitor response to Botox and report at next appointment.   Referrals: None.   Follow up: Samara Oropeza was rescheduled for the next series of injections in 12 weeks, at which time we will evaluate response to today's injections. she may call the clinic prior with any questions or concerns prior to the next appointment.

## 2023-09-13 NOTE — TELEPHONE ENCOUNTER
[facility] Call Center    Phone Message    May a detailed message be left on voicemail: yes     Reason for Call: Other: Cristopher is calling from [facility] and is stating that he would like a call back and he also stated that it is a secure VM. Please call back.     Action Taken: Message routed to:  [facility] ([facility]): PMR    Travel Screening: Not Applicable

## 2023-09-14 ENCOUNTER — TELEPHONE (OUTPATIENT)
Dept: DERMATOLOGY | Facility: CLINIC | Age: 28
End: 2023-09-14
Payer: COMMERCIAL

## 2023-09-15 ENCOUNTER — VIRTUAL VISIT (OUTPATIENT)
Dept: DERMATOLOGY | Facility: CLINIC | Age: 28
End: 2023-09-15
Payer: COMMERCIAL

## 2023-09-15 DIAGNOSIS — M35.2 BEHCET'S SYNDROME (H): Primary | ICD-10-CM

## 2023-09-15 DIAGNOSIS — M35.2 BEHCET'S DISEASE (H): ICD-10-CM

## 2023-09-15 PROCEDURE — 99442 PR PHYSICIAN TELEPHONE EVALUATION 11-20 MIN: CPT | Mod: 93 | Performed by: DERMATOLOGY

## 2023-09-15 RX ORDER — MOMETASONE FUROATE 1 MG/G
OINTMENT TOPICAL 2 TIMES DAILY
Qty: 45 G | Refills: 11 | Status: SHIPPED | OUTPATIENT
Start: 2023-09-15 | End: 2024-08-27

## 2023-09-15 RX ORDER — FLUOCINONIDE GEL 0.5 MG/G
GEL TOPICAL 2 TIMES DAILY
Qty: 60 G | Refills: 11 | Status: SHIPPED | OUTPATIENT
Start: 2023-09-15 | End: 2024-08-27

## 2023-09-15 ASSESSMENT — PAIN SCALES - GENERAL: PAINLEVEL: NO PAIN (0)

## 2023-09-15 NOTE — PROGRESS NOTES
[facility] Dermatology Note  Encounter Date: Sep 15, 2023  Telephone ([phone] ). Location of teledermatologist: [facility] [place]. Start time: 11:33. End time: 11:45.    Dermatology Problem List:  1. Behcet's syndrome   - Diagnosed 2014, primarily managed by rheumatology  - Periodic painful oral/genital (scarring) ulcers, folliculitis, hx positive pathergy test, prior success with apremilast (holding for pregnancy)  - Current: apremilast 30 mg BID per rheum, mometasone ointment, fluocinonide gel  - Prior: colchicine (holding for pregnancy), adalimumab (ineffective), triamcinolone paste for oral lesions, triamcinolone 0.1% oint BID PRN for genital/body lesions  - Future: certolizumab       ____________________________________________    Assessment & Plan:     Behcet's: overall stable on current regimen and skin improving in postpartum period. Had a few ulcers recently but these are responding to topicals. Saw Dr. Leung and starting azelaic acid for hyperpigmentation. Will continue current regimen.  - apremilast 30 mg BID  - topicals    Procedures Performed:    None    Follow-up: 6 months    Staff:     Cornell Tracey MD, FAAD   of Dermatology  Department of Dermatology  [facility] [employer]    ____________________________________________    CC: RECHECK    HPI:  Ms. Samara Oropeza is a(n) 27 year old female who presents today as a return patient for Behcet's    Behcet's - skin improving postpartum (daughter 7 months)  - apremilast BID  - using topicals PRN - mometasone ointment, fluocinonide gel   - had small flare last week, usually ~1x/mo    Saw Dr. Leung a couple weeks ago for hyperpigmentation    Patient is otherwise feeling well, without additional skin concerns.    Labs Reviewed:  N/A    Physical Exam:  Vitals: There were no vitals taken for this visit.  SKIN: no photos    Medications:  Current Outpatient  Medications   Medication    acetaminophen (TYLENOL) 325 MG tablet    albuterol (PROAIR HFA/PROVENTIL HFA/VENTOLIN HFA) 108 (90 Base) MCG/ACT inhaler    amitriptyline (ELAVIL) 25 MG tablet    apremilast (OTEZLA) 30 MG tablet    artificial tears OINT ophthalmic ointment    azelaic acid (FINACIA) 15 % external gel    benzocaine (AMERICAINE) 20 % external aerosol    benzocaine (TOPICALE XTRA) 20 % GEL    betamethasone valerate (VALISONE) 0.1 % cream    bisacodyl (DULCOLAX) 5 MG EC tablet    citalopram (CELEXA) 20 MG tablet    docusate sodium (COLACE) 100 MG capsule    enoxaparin ANTICOAGULANT (LOVENOX) 40 MG/0.4ML syringe    enoxaparin ANTICOAGULANT (LOVENOX) 40 MG/0.4ML syringe    EPINEPHrine (EPIPEN 2-EAMON) 0.3 MG/0.3ML injection    fluocinonide (LIDEX) 0.05 % external gel    gabapentin (NEURONTIN) 300 MG capsule    hydrocortisone, Perianal, (ANUSOL-HC) 2.5 % cream    hypromellose (GENTEAL) 0.3 % SOLN    ibuprofen (ADVIL/MOTRIN) 800 MG tablet    lactulose 20 GM/30ML SOLN    lanolin ointment    lidocaine (LMX4) 4 % external cream    LINZESS 290 MCG capsule    mometasone (ELOCON) 0.1 % external ointment    NIFEdipine ER OSMOTIC (ADALAT CC) 60 MG 24 hr tablet    NIFEdipine ER OSMOTIC (PROCARDIA XL) 30 MG 24 hr tablet    order for DME    order for DME    polyethylene glycol (MIRALAX/GLYCOLAX) powder    Prenatal MV-Min-Fe Fum-FA-DHA (PRENATAL 1 PO)    sodium fluoride 1.1 % CREA    sucralfate (CARAFATE) 1 GM/10ML suspension    triamcinolone (KENALOG) 0.1 % external ointment     Current Facility-Administered Medications   Medication    botulinum toxin type A (BOTOX) 100 units injection 200 Units    triamcinolone (KENALOG-40) injection 40 mg    triamcinolone (KENALOG-40) injection 40 mg    triamcinolone (KENALOG-40) injection 40 mg      Past Medical/Surgical History:   Patient Active Problem List   Diagnosis    Tics - Tourette syndrome    IBS (irritable bowel syndrome)    Allergic rhinitis    Generalized anxiety disorder     Knee pain    Concussion    Milk protein intolerance    Headaches due to old head injury    Behcet's disease (H)    Migraine    Spell of shaking    On colchicine therapy    Palpitations    Fibromyalgia    Rheumatoid arthritis of multiple sites without rheumatoid factor (H)    Raynaud's disease without gangrene    Chronic abdominal pain    Patellofemoral instability    PTSD (post-traumatic stress disorder)    Cervical dystonia    Acute left ankle pain    Cervical pain    Major depressive disorder, recurrent episode, moderate (H)    Chronic pain syndrome    Convulsions, unspecified convulsion type (H)    Transient alteration of awareness    Vitamin D deficiency    Mobile cecum    Spells of decreased attentiveness    Pelvic floor weakness    Somatic symptom disorder    Gastroparesis    GERD (gastroesophageal reflux disease)    Displacement of lumbar intervertebral disc without myelopathy    Intestinal malabsorption    Iron deficiency associated with nonfamilial restless legs syndrome    Enthesopathy of hip region    Tourette's syndrome    Cellulitis    Infection of joint of ankle (H)    Preeclampsia     Past Medical History:   Diagnosis Date    Anemia     Anxiety     Arthritis     Behcet's disease (H)     Cervical adenitis May 2010    Chronic abdominal pain     Constipation, chronic 1994    Fibromyalgia     Gastro-oesophageal reflux disease     Gastroparesis     Irregular heart beat     tachycardia, has had workup    Migraines     Neuromuscular disorder (H)     fibramyalgia    Palpitations     PONV (postoperative nausea and vomiting)     Seizure (H)     Seizures (H)     unknown etiology    Syncope     Tourette's        CC No referring provider defined for this encounter. on close of this encounter.

## 2023-09-15 NOTE — LETTER
9/15/2023       RE: Samara Oropeza  [address] [facility] [address]  [facility] [place] [address]     Dear Colleague,    Thank you for referring your patient, Samara Oropeza, to the [facility] [place] at [facility]. Please see a copy of my visit note below.    [facility] Dermatology Note  Encounter Date: Sep 15, 2023  Telephone ([phone] ). Location of teledermatologist: [facility] [place]. Start time: 11:33. End time: 11:45.    Dermatology Problem List:  1. Behcet's syndrome   - Diagnosed 2014, primarily managed by rheumatology  - Periodic painful oral/genital (scarring) ulcers, folliculitis, hx positive pathergy test, prior success with apremilast (holding for pregnancy)  - Current: apremilast 30 mg BID per rheum, mometasone ointment, fluocinonide gel  - Prior: colchicine (holding for pregnancy), adalimumab (ineffective), triamcinolone paste for oral lesions, triamcinolone 0.1% oint BID PRN for genital/body lesions  - Future: certolizumab       ____________________________________________    Assessment & Plan:     Behcet's: overall stable on current regimen and skin improving in postpartum period. Had a few ulcers recently but these are responding to topicals. Saw Dr. Leung and starting azelaic acid for hyperpigmentation. Will continue current regimen.  - apremilast 30 mg BID  - topicals    Procedures Performed:    None    Follow-up: 6 months    Staff:     Cornell Tracey MD, FAAD   of Dermatology  Department of Dermatology  [facility]    ____________________________________________    CC: RECHECK    HPI:  Ms. Samara Oropeza is a(n) 27 year old female who presents today as a return patient for Behcet's    Behcet's - skin improving postpartum (daughter 7 months)  - apremilast BID  - using topicals PRN - mometasone  ointment, fluocinonide gel   - had small flare last week, usually ~1x/mo    Saw Dr. Leung a couple weeks ago for hyperpigmentation    Patient is otherwise feeling well, without additional skin concerns.    Labs Reviewed:  N/A    Physical Exam:  Vitals: There were no vitals taken for this visit.  SKIN: no photos    Medications:  Current Outpatient Medications   Medication    acetaminophen (TYLENOL) 325 MG tablet    albuterol (PROAIR HFA/PROVENTIL HFA/VENTOLIN HFA) 108 (90 Base) MCG/ACT inhaler    amitriptyline (ELAVIL) 25 MG tablet    apremilast (OTEZLA) 30 MG tablet    artificial tears OINT ophthalmic ointment    azelaic acid (FINACIA) 15 % external gel    benzocaine (AMERICAINE) 20 % external aerosol    benzocaine (TOPICALE XTRA) 20 % GEL    betamethasone valerate (VALISONE) 0.1 % cream    bisacodyl (DULCOLAX) 5 MG EC tablet    citalopram (CELEXA) 20 MG tablet    docusate sodium (COLACE) 100 MG capsule    enoxaparin ANTICOAGULANT (LOVENOX) 40 MG/0.4ML syringe    enoxaparin ANTICOAGULANT (LOVENOX) 40 MG/0.4ML syringe    EPINEPHrine (EPIPEN 2-EAMON) 0.3 MG/0.3ML injection    fluocinonide (LIDEX) 0.05 % external gel    gabapentin (NEURONTIN) 300 MG capsule    hydrocortisone, Perianal, (ANUSOL-HC) 2.5 % cream    hypromellose (GENTEAL) 0.3 % SOLN    ibuprofen (ADVIL/MOTRIN) 800 MG tablet    lactulose 20 GM/30ML SOLN    lanolin ointment    lidocaine (LMX4) 4 % external cream    LINZESS 290 MCG capsule    mometasone (ELOCON) 0.1 % external ointment    NIFEdipine ER OSMOTIC (ADALAT CC) 60 MG 24 hr tablet    NIFEdipine ER OSMOTIC (PROCARDIA XL) 30 MG 24 hr tablet    order for DME    order for DME    polyethylene glycol (MIRALAX/GLYCOLAX) powder    Prenatal MV-Min-Fe Fum-FA-DHA (PRENATAL 1 PO)    sodium fluoride 1.1 % CREA    sucralfate (CARAFATE) 1 GM/10ML suspension    triamcinolone (KENALOG) 0.1 % external ointment     Current Facility-Administered Medications   Medication    botulinum toxin type A (BOTOX) 100 units  injection 200 Units    triamcinolone (KENALOG-40) injection 40 mg    triamcinolone (KENALOG-40) injection 40 mg    triamcinolone (KENALOG-40) injection 40 mg      Past Medical/Surgical History:   Patient Active Problem List   Diagnosis    Tics - Tourette syndrome    IBS (irritable bowel syndrome)    Allergic rhinitis    Generalized anxiety disorder    Knee pain    Concussion    Milk protein intolerance    Headaches due to old head injury    Behcet's disease (H)    Migraine    Spell of shaking    On colchicine therapy    Palpitations    Fibromyalgia    Rheumatoid arthritis of multiple sites without rheumatoid factor (H)    Raynaud's disease without gangrene    Chronic abdominal pain    Patellofemoral instability    PTSD (post-traumatic stress disorder)    Cervical dystonia    Acute left ankle pain    Cervical pain    Major depressive disorder, recurrent episode, moderate (H)    Chronic pain syndrome    Convulsions, unspecified convulsion type (H)    Transient alteration of awareness    Vitamin D deficiency    Mobile cecum    Spells of decreased attentiveness    Pelvic floor weakness    Somatic symptom disorder    Gastroparesis    GERD (gastroesophageal reflux disease)    Displacement of lumbar intervertebral disc without myelopathy    Intestinal malabsorption    Iron deficiency associated with nonfamilial restless legs syndrome    Enthesopathy of hip region    Tourette's syndrome    Cellulitis    Infection of joint of ankle (H)    Preeclampsia     Past Medical History:   Diagnosis Date    Anemia     Anxiety     Arthritis     Behcet's disease (H)     Cervical adenitis May 2010    Chronic abdominal pain     Constipation, chronic 1994    Fibromyalgia     Gastro-oesophageal reflux disease     Gastroparesis     Irregular heart beat     tachycardia, has had workup    Migraines     Neuromuscular disorder (H)     fibramyalgia    Palpitations     PONV (postoperative nausea and vomiting)     Seizure (H)     Seizures (H)      unknown etiology    Syncope     Tourette's        CC No referring provider defined for this encounter. on close of this encounter.

## 2023-09-15 NOTE — NURSING NOTE
Teledermatology Nurse Call Patients:     Are you in the [facility] at the time of the encounter? yes    Today's visit will be billed to you and your insurance.    A teledermatology visit is not as thorough as an in-person visit and the quality of the photograph sent may not be of the same quality as that taken by the dermatology clinic.    Chief Complaint   Patient presents with    MARILUZ Medel  PT states she doesn't have anything to take pictures of.

## 2023-09-19 RX ORDER — METHYLPREDNISOLONE SODIUM SUCCINATE 40 MG/ML
40 INJECTION, POWDER, LYOPHILIZED, FOR SOLUTION INTRAMUSCULAR; INTRAVENOUS ONCE
Status: COMPLETED | OUTPATIENT
Start: 2023-09-19 | End: 2023-09-19

## 2023-09-19 RX ADMIN — METHYLPREDNISOLONE SODIUM SUCCINATE 40 MG: 40 INJECTION, POWDER, LYOPHILIZED, FOR SOLUTION INTRAMUSCULAR; INTRAVENOUS at 18:25

## 2023-09-27 ENCOUNTER — OFFICE VISIT (OUTPATIENT)
Dept: RHEUMATOLOGY | Facility: CLINIC | Age: 28
End: 2023-09-27
Attending: INTERNAL MEDICINE
Payer: COMMERCIAL

## 2023-09-27 VITALS
HEART RATE: 116 BPM | WEIGHT: 153.3 LBS | HEIGHT: 63 IN | BODY MASS INDEX: 27.16 KG/M2 | DIASTOLIC BLOOD PRESSURE: 71 MMHG | SYSTOLIC BLOOD PRESSURE: 120 MMHG

## 2023-09-27 DIAGNOSIS — M25.50 ARTHRALGIA, UNSPECIFIED JOINT: ICD-10-CM

## 2023-09-27 DIAGNOSIS — M35.2 BEHCET'S DISEASE (H): Primary | ICD-10-CM

## 2023-09-27 PROCEDURE — G0463 HOSPITAL OUTPT CLINIC VISIT: HCPCS | Performed by: INTERNAL MEDICINE

## 2023-09-27 PROCEDURE — 99214 OFFICE O/P EST MOD 30 MIN: CPT | Performed by: INTERNAL MEDICINE

## 2023-09-27 RX ORDER — CELECOXIB 100 MG/1
100 CAPSULE ORAL 2 TIMES DAILY PRN
Qty: 60 CAPSULE | Refills: 1 | Status: SHIPPED | OUTPATIENT
Start: 2023-09-27 | End: 2024-02-01

## 2023-09-27 ASSESSMENT — PAIN SCALES - GENERAL: PAINLEVEL: SEVERE PAIN (6)

## 2023-09-27 NOTE — PATIENT INSTRUCTIONS
Try celebrex 1 tab of 100 mg twice a day as needed for pain    Return video visit in about 4 momths

## 2023-09-27 NOTE — PROGRESS NOTES
Office Visit Details      Rheumatology Clinic Return Visit Patient     Samara Oropeza MRN# [id number]   YOB: 1995 Age: 28 year old     Date of Visit: 2023   Last seen: 2023  Primary care provider: Ayde, Jacobo [place]          Assessment & Plan    Assessment & Plan   Samara is a 27 year old  female (ADRIAN 22) with Behçet's disease (pathergy, oral/genital ulcers, polyarthralgia) who presents today for RECHECK (Follow up with RA)  .    # Behçet's disease (pathergy, arthralgias, recurrent oral / genital ulcers)  # s/p delivery on 2023 with high-risk pregnancy, complicated by pre-eclampsia,  birth but healthy baby    10/2022:  Mrs. Oropeza arrives for follow-up of her Behçet's disease that is more active at present with recent decrease of her Otezla (60mg --> 30mg) recommended by MF. Previous discussion with her OB providers had been to decrease to the lowest effective dose, clearly 30mg is not effective as she describes worsened oral ulcers, genital ulcers, arthralgias, abdominal pain, and thrombophlebitis. Although there is not a great deal of evidence for Otezla safety in pregnancy, its mechanism of action would suggest it. Counterbalancing these concerns are what we understand of Behçet's in pregnancy which more often improves but in some cases (~29%) becomes worse, and can flare more often in the 1st trimester and post-lorena period.  She also describes some vision changes, which in the context of Behçet's is conerning. She has not seen Ophthalmology in many years; I will place a referral today.     2023: Stable today, on otezla 30 mg bid, breastfeeding, [facility] is ok with breastfeeding, discussed ACR reproductive guideline, it does not recommend otezla during pregnancy and breastfeeding given lack of data; however Samara remained on it during pregnancy and now breastfeeding as stopping it leads to severe flare of her behcet and benefits of  staying on it outweighs risks. Her [facility] provider is aware of staying on otezla and is ok with it during breastfeeding.    Today 9/27/23: Doing well, remains on otezla. Has had some interruptions in doses. Today has back and hand pain, and ankle swelling. Will start celebrex, ok with breastfeeding.     Plan:    No labs today    Start Celebrex     Try celebrex 1 tab of 100 mg twice a day as needed for pain    Return video visit in about 4 months    I saw and examined the patient with Dr. Sosa. I acted as scribe for Dr. Sosa.    Amy Olmstead, Medical Student on 9/27/2023 at 3:52 PM      Attending Note: I saw and examined the patient with medical student Amy. This note was written by her, who acted as scribe for me. I agree with findings and recommendations written in this note. The note reflects decisions made by me.    Dominga Sosa MD                Medical History   History of Present Illness   Samara Oropeza is a 27 year old female who presents for follow-up of her Behçet's in the context of pregnancy.      Seen Dr. Marilyn Moran Rheumatology in [facility] 10/14:    Original presentation 2014:     Ms Oropeza is a 19 y.o. female who presents with one year of presentation of painful oral ulcers (monthly) once that have worsened with two episodes in the last two months that presented with painful vulvar or vaginal ulcers (2 episodes so far every month) [not sure if they leave scar] as well that timing coincides with menses ([facility]: 8/25/14).   ORAL ULCERS: last two episodes were severe, painful, white base with erythematous halo, that appear and disappear in the matter of 1-2 weeks without, does not bleed and doesn't respond to any treatment used (magic mouthwash), 10-15 in number with the biggest one being dime size.      VAGINAL ULCERS: 2-3 in number between labia majora and minora that occur simultaneously with oral ulcers, painful, non bleeding ulcers that are erythematous, no pus.      FOLLICULITIS: patient  reports having spots in legs specially around ankle and knee, but arms, and neck are affected as well. Small lesions that resemble ingrown hair, most are red erythematous elevated lesions, well demarcated, some with liquid that patient refers as pimple like.      SKIN RASHES: welts and red macules with well defined borders that are pruritic and non-ulcerative on chest, arms and back. Benadryl relieves.      ARTHRALGIAS: In descending order of severity: hips, knees, wrists, shoulders, neck, lumbar, fingers.   C/o morning stiffness in hips, back and neck lasting for about until noon.      Ms. Oropeza reports they present in severe flares and never fully resolve, but do get better. She has seen some swelling and warmth on the affected joints but has not noticed and redness. Has tried Tylenol, ibuprofen, and hydrocodone with not much benefit.      FEVERS: Reports 3-4 sporadic episodes per month of self limited fevers of 38 C that occur during the evenings and associate facial flushing.      HEADACHES: occur daily and are bitemporal and irradiate occipitally, pulsatile in nature, intensity varies from intense to mild, are worsened with movement. On treatment with ibuprofen and somatriptan without any positive results.      VISION CHANGES: Patient reports that suddenly she would only see a gray blotch in her right eyes and would persist like this for a day and a half. Also reports blurriness in her left eye. Presence of pain and burning sensation of eyeball with associated redness. Has changed prescription glasses in the matter of 2 years 3 times. She has had opthalmology exam and was not suggestive of any evidence of uveitis.   She was also evaluated in ED for a dizzy spell.      ABD PAIN W/ INTERMITTENT DIARRHEA AND CONSTIPATION: Patient reports abdominal pain that is intermittent, periods of 7 days without bowel movement and feeling bloated with change of pattern to diarrhea (liquidy without blood nor mucus, non foul  smelling). Has taken Bentyl, Zegrid, and rinetidine with mild clinical improvement.  She also reports small red nodules after each needle stick for blood draw.   Neurology saw her back then and was not impressed with her presentation as physical examination was normal. Also MRI brain normal: Findings: No definite hemorrhage, mass affect, midline shift, or ventriculomegaly is noted.There are a few scattered non specific white matter T2 hyperintensities. Axial diffusion weighted images are unremarkable.     The major vascular structures appear patent. There is opacification and severe mucosal thickening in the right maxillary sinus. The remaining paranasal sinuses, and mastoid air cells are clear. Orbits are unremarkable  She has + HSV-1 IGG. Seen ID. Negative for Herpes simplex virus culture. HSV IGM I/II COMBINATION SENT TO [facility]  RESULTS = <0.91  REF RANGE: <0.91 = NEGATIVE  ID did not think HSV could explain her mouth and genital sores.      CRP within normal limits. HIV negative. CBC within normal limits; UA negative. Creatinine within normal limits   CYNDIE neg.  Antigliadin neg.   ANti TTG neg.   [facility] 2014: Colonoscopy and endoscopy neg; result not available. Not sure if biopsies were done.   Raynaud's phenomenon:  Onset: about 2013  Digits: all fingers  Digital ulcers: no  Color changes: white ---> purple and red  Duration of an attack: About couple of hours per mom  Pain during attack: not clear pain but bruise like feeling  Frequency of attacks: few times a month  Family history of Raynauds: no  GERD: very long time     No hemoptysis.   No miscarriages/ no thrombosis in past.   No family or personal history of psoriasis, ulcerative colitis or chron's disease.   No buttock pain or low back pain or stiffness in AM     Interim history:  Dec 2014- Multiple mouth ulcers and did not eat for 5 days. This coincided with periods. Colchicine 0.6mg PO BID started in Nov 2014.   Prednisone taper in Dec 20mg to 0 in 4  weeks. She also used magic mouthwash. Kenalog cream. After going to the ER, 3 days later her ulcers were better. She thinks it improved after the menstruation stopped.   LMP 3 weeks ago.   COLCHICINE HAS HELPED A LOT REDUCING THE INTENSITY OF MENSTRUATION ASSOCIATED ORAL AND VULVAR ULCERS.      Interim history: 6/15     Since last visit only two episodes of mouth sores- small sized 6   No genital ulcers since last visit.   Colchicine 1.2 mg in AM and 0.6mg in PM keeps her symptoms under control.      Admitted in 5/15:  Admission Diagnoses:  - LUE weakness  - spell  - hx of migraines  - hx of Tourette syndrome  - hx of Behcet's disease     Discharge Diagnoses:   - spell likely 2/2 anxiety  - hx of migraines  - hx of Tourette syndrome  - hx of Behcet's disease     CT and MRI brain was normal.      Seeing Dr. Lilliana Miramontes soon as outpatient.      Dr. Garcia did not find any intraocular inflammation.       Interm 10/30/2015  ED for migraine. Continues to see neuro - MRI brain without lesions noted. Saw GI - upper barium normal. May be considering repeat colo. Worsening lesions in mouth and genitals. Has had continuous lesion in mouth x 2 months. 2 genital ulcers. Had fracture of right sesamoid in foot. Continues to have low grade fevers. New folliculitis on chest, legs and arms.      Interim hx: 1/11/2016    Pt states that since last visit she continues to have oral ulcers and has noted more folliculitis-like lesions on her lower extremities.  She states that on her right lower leg she had a red macular spot that has since resolved.  She denies uveitis.  She states that the colchicine initially helped but then stopped working.  She takes 0.6 mg TID.  She stopped taking her prednisone last week as she feels like this hasn't helped.  She hasn't had any episodes of vaginal ulcers.      She saw GI who stated she has gastroparesis.  She is seeing Cardiology later this week for possible syncopal episodes.       Interim  "history 5/16  Mouth ulcers X 1 last month  Genital ulcers - none since last visit.      Bilateral MCPs pain, knee pain and low back pain +ve increased since last visit  Morning stiffness X 2 hours (increasing)   5-6/10     \"overall myalgia\" has gotten worse    C/o pseudofolliculitis lesion on anterior shins bilaterally worse     Interim history: (7/18/2016)  Patient has just started Humira for 2 doses on 7/1 and 7/15 and reported minimal improvement of her hip pain but otherwise, did not notice anything different.      She reported painful mouth ulcer once a month since last visit but noticed that it has been getting deeper.   Denied any genital ulcers.     Still has ongoing bilateral MCPs pain, knee pain but this has been intermittent and she did not have any much pain today. She reported 2-3 hours morning stiffness of both hands and pain score of 6/10 at her knees and 8/10 of her hands but currently takes no pain medication except prn ativan which she takes when her muscle is really tight.      The only concern is that she gained weight even though she has not been eating much (eat once a day) and do exercises every day for 1 hour (with video of yoga, pilates). Her mother wonders if she needs to have some labs work up include sex hormone, thyroid, cortisol.     Interval history 9/14/16  Patient was started Humira at the last visit, patient reports worsening of skin ulcers since starting Humira and stopped taking Humura for the past 2 weeks. Patient reports oral and genital ulcers has been stable even before starting Humira and has not had any interval oral/genital ulcers. However she reports recurrent skin ulcers occurring on a daily basis that affects her chest and anterior legs. Patient also has stopped taking prednisone, she reports that she doesn't feel the prednisone helps, her last dose of prednisone was 6+ months ago. Patient also report worsening low back pain that sometimes causes her to limp.     In the " interval patient also visited ED on 8/31/16 for left hand pain and what the patient describes as phlebitis, no medication or procedure was done and the patient was discharged with a splint. Patient also reported 1 episode of chest pressure that was associated with dyspnea and numbness of the left arm, she was shopping in a supermarket at the time of onset, and the pressure resolved after she took a nap.     Patient denies any infectious symptoms in the interval, no fever, chills, night sweat, cough, dysuria, denies eye pain or visual changes.     November 4, 2016  Oct - had one genital ulcer  Oral ulcers X 5- around menstruation time.   Knee pain- chronic on the left side  Dizziness spells - on and off; been to the ER for that in the past.   On and off migraines  July 2013 - s/p MPFL reconstruction plus LRL 7/2013  Morning stiffness in knee (left) X 1 hour     Severe jaw pain and chest pain- patient wondering if this is Tourette's or esophageal spasms. Cardiac workup negative.   Fever      January 13, 2017  Yesterday in ER [facility] with orthostatic syncope from dehydration.   Chest pain on and off still continues. Painful with deep breaths.   Oral ulcers - few minor ones lasting for one week;   One major one in roof of mouth lasting for about one week.   Knee pain +ve - reviewed the recent MRI with her orthopedic surgeon.   On and off migraines  otezla is approved. Still waiting to receive the meds.      March 31, 2017  Otezla current dose 15mg PO BID.   She is tolerating okay at this dose and is willing to increase the dose further up to 30mg BID.   Her joint pains are better on otezla. Knee pain is also better.   Back and neck pain +ve 8/10 when it is worse. Intramuscular botox injections were given on Monday in PMR.   2 minor genital ulcers in the interim- resolved.   Few oral ulcers in the interim- resolved.   Nasal ulcer - resolved.   Migraines on and off.   Nausea with otezla but improving.   Dizziness and  blackouts on and off. She fell once and hurt her ankle. Wearing boot in left leg.   Complete ROS negative except for above     May 17, 2017  Tolerating otezla better. Not throwing up anymore. Current dose 20mg in AM and 30mg in PM (2nd week).   Patient thinks that her oral ulcers frequency and severity are improving with otezla. Also no genital ulcers in the interim.   Currently on doxycycline for bronchitis/?pneunomia. 4 more days left.      Joint pain history  2 weeks ago/ dx with pneumonia 1 week ago/  Involved joints: all over  Pain scale: 6.5/10   Wakes the patient from sleep : Yes  Morning stiffness: Yes for 120 minutes  Meds used:otezla,colchicine     Interim history  Since last visit:  1. Infections - Yes/ pneumonia  2. New symptoms/medical problem - Yes/ thinks she may have had a mini-seizure about 10 days ago ; did not seek medical attention; did not reoccur after that. Patient seeing PCP today.  3. Any side effects from Rheum medications -vomiting a lot (resolved)  3. ER visits/Hospitalizations/surgeries - Yes  4. Last PCP visit: has an apt. today     Patient still getting double vision. Floaters present.      Therapist recommended psychiatry evaluation. Patient does not want to see psychaitry. Patient will see PCP today.      August 22, 2017  Have you ever seen a rheumatologist Yes Who You When 5/17/17     NO genital ulcers in the interim.   Mouth ulcers- three in the back of the throat and roof of the mouth last month.      Joint pain history  Onset: doing alittle worse / cut her otezla back to 30mg  Daily due to GI problems  Involved joints: all over her body. Been having more black out episodes, been having more chest pains.also has raised bumps on both legs from the knees down that itch and are painful   Pain scale:  5.5/10     Wakes the patient from sleep : Yes  Morning stiffness:Yes for 120 minutes  Meds used:otezla, colchicine (three pills daily)     Interim history  Since last visit:  1.  Infections - Yes stomach issue  2. New symptoms/medical problem - No  3. Any side effects from Rheum medications -nausea from otezla  3. ER visits/Hospitalizations/surgeries - Yes, ER for foot, ankle injury from passing out and fell down the steps. Hit her head another time from passing out. Alcohol poisoning in July 4. Last PCP visit: 6/23/17 September 19, 2017  Have you ever seen a rheumatologist Yes Who You When 8/22/17  Joint pain history  Onset: pt states that she hurts everywhere for 6 days  Involved joints: all joints  Pain scale:  7/10     Wakes the patient from sleep : Yes/ not sleeping at all  Morning stiffness:Yes for 180 minutes  Meds used:colchicine, otezla, magic mouth wash, lidocaine gel  Last one week: increased joint pain; and genital ulcer  Genital lesion (dimed size) in the last one week  After botox a week ago, she had fever 101 for three days; near syncopes. Back pain; floaters  Chest pain , mouth sores, hand pain, morning pain were all better with otezla 30mg twice daily.     Interim history  Since last visit:  1. Infections - No  2. New symptoms/medical problem - No  3. Any side effects from Rheum medications -nausea from the otezla  3. ER visits/Hospitalizations/surgeries - No  4. Last PCP visit: may 2017      October 18, 2017     Have you ever seen a rheumatologist Yes Who You When 9/19/17  Joint pain history  NO mouth or genital sores in the last 4 weeks.   Medrol dosepak helped with visual symptoms but not joint pains.      Onset: pt states that she is doing better than last time with her behcet's. Today has severe stomach pains, states that she is constipated. Pt had a seizure 2 days ago. Does have a metallic taste in her mouth  Involved joints: hands , neck, shoulders  Pain scale:  9/10 from stomach pain;      Wakes the patient from sleep : Yes  Morning stiffness:Yes for 120 minutes  Meds used:cochicine , otezla 30mg twice daily     Interim history  Since last visit:  1. Infections  - No  2. New symptoms/medical problem - Yes/ the cartilage in her chest is inflamed  3. Any side effects from Rheum medications -nausea from the otezla  3. ER visits/Hospitalizations/surgeries - No  4. Last PCP visit: yesterday     January 16, 2018  Have you ever seen a rheumatologist Yes Who You When 10/18/17  Joint pain history  Onset: pt states that she was in the hospital for 5 days before maribell, they told her she had lesions in her brain in the white matter. Had blood work drawn in the ER and they told her her inflammatory markers were elevated. Was seen in the ER last week for vomiting and diarrhea, not eating. Saw Gastro. On 1/10/18. 1.5 weeks ago she had an endoscopy and colonoscopy. She has been having elbow  And hands and knee pain, loosing use of her hands. Been dropping things. Also states that she has been getting lesions up her nose  Involved joints: see above  Pain scale:  8/10     Wakes the patient from sleep : Yes  Morning stiffness:Yes for 120 minutes  Meds used:colchisine, otezla, magic mouth wash, kenolog cream, lidocaine gel     Interim history  Since last visit:  1. Infections - Yes/ had an infection in her jaw. Having sinus issues  2. New symptoms/medical problem - Yes/ states that she is having problems walking, states that she was bouncing when she was walking  3. Any side effects from Rheum medications -otezla, nausea  3. ER visits/Hospitalizations/surgeries - Yes/ see chart  4. Last PCP visit: November 2017 April 17, 2018  Have you ever seen a rheumatologist Yes Who You When 1/16/18  Joint pain history  Onset: pt states that she is not doing well. She is having increased stomach issues, only eats 2 times in 4 days unable to keep the otezla down due to vomiting . Has sleep study done in 1 week; no genital ulcers since last appointment. 6 small mouth sores since last appointment.   Involved joints: see above   Pain scale:  7/10     Wakes the patient from sleep : Yes  Morning  stiffness:Yes for 60 minutes  Meds used:otezla , colchicine, diclofenac gel, magic mouthwash, lidocaine gel      Interim history  Since last visit:  1. Infections - Yes/ had a sinus infection, thinks she is getting sick now  2. New symptoms/medical problem - Yes/ neurology sent pt to cardiology. Has a heart condition but not sure what . She is seeing a ortho. Due to knee pain   3. Any side effects from Rheum medications -nausea/vomiting from the otezla   3. ER visits/Hospitalizations/surgeries - Yes/ seen in ER   4. Last PCP visit: yesterday     July 17, 2018  Have you ever seen a rheumatologist Yes Who You When 4/17/18  Joint pain history  Onset: pt Is here for a follow-up states that she started to get lesions on her legs , face and arm. Hurts all over, lower back is very painful. Right hand and wrist area are numb  Involved joints: see above  Pain scale:  7/10   worse in the morning  Wakes the patient from sleep : Yes/ does not go to sleep till late  Morning stiffness:Yes for 4-5 hours minutes  Meds used:colchicine, otezla has  Not started otezla yet due to extreme nausea      Interim history  Since last visit:  1. Infections - Yes/ had a bacterial infection in her pelvic area was hospitalized  2. New symptoms/medical problem - Yes/ hands are swelling up. Having orthopedic issues. Was having problem with her veins sticking up, and very painful. Has happened multiply times   3. Any side effects from Rheum medications -see above  3. ER visits/Hospitalizations/surgeries - Yes/ hospital and ER for bacterial infection  4. 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 ER said may have blood clot in calf, but when did MRI, stated body may have broken it up on it's own. Elevated D-dimer  Involved joints: Knees, neck, hands  Pain scale:  6.5/10     Wakes the patient  from sleep : Yes  Morning stiffness:Yes for 180 minutes  Meds used:hyoscyamine, not taking otezla. Last dose Sep. Patient did not want to take otezla with the antibiotics.      Last major mouth ulcer- aug or Sep  No genital ulcers in the last 6 months.      Interim history  Since last visit:  1. Infections - Yes, UTI's twice a month since last visit  2. New symptoms/medical problem - No  3. Any side effects from Rheum medications -otzela causes nausea  3. ER visits/Hospitalizations/surgeries - Yes, 1/2/19 repaired some ligaments and mass taken out, also joint build up removed from a previous injury  4. Last PCP visit: 12/5/19 June 12, 2019  Have you ever seen a rheumatologist yes Who you When 1/9/19  Joint pain history  Onset: Patient is here for a follow up. A lot of infections and in and out of the hospital, who wanted her to follow up with Rheumatology again.  Involved joints: knees, legs, back, neck, shoulders, ankles  Pain scale:  6.5/10     Wakes the patient from sleep : Yes  Morning stiffness:Yes for 120 minutes  Meds used:magic mouthwash, colchicine     Interim history  Since last visit:  1. Infections - Yes, staph  2. New symptoms/medical problem - kidney failure  3. Any side effects from Rheum medications -none  3. ER visits/Hospitalizations/surgeries - Yes, 3 hospitalizations, and surgeries,  4. Last PCP visit: 6/11/19        Today 9/30/2020: New to me, establishing care. Flaring off otezla.     816   Reports worsening oral ulcers and joint pain and skin rash.     No vaginal ulcers currently. Last ones were 5 months ago.     Gets oral ulcers monthly, not today, had them last few days ago. They come up as flare up around period time. They self-resolve.     Thinks colcrys is helping but not enough, lost some benefit. No longer has diarrhea from it. the dose is 0.6 mg bid.     Came off otezla last year when she had severe staff infection, there was drug drug interaction with vancomycin. Wants to go back  on it.     Skin lesions over chest are clearing up. Has skin lesions over her legs.     She is off of otezla >1 yr. She had daily vomiting and abdominal cramps initially which later resolved after 2 months.      Today, is her last day liz her health insurnaace  .     Reports pain and swelling liz hands. Drops things, lost strenghth. Veins look inflamed. Hands are swollen in AM and before bedtime. AM stiffness is 2 hours. can t tolerate ibuprofen.     Prednnisonne does not help much.     Wants to try eleve.     Had 2 blood clots over L arm, finished xraalto 4 months ago.      Was told to have severe dry eyes, hs not had eyes checked> 1 yr as was in the hospital with staff infection. systanee nd gentle eyedrops help some, sometimes gets sharp pain and and blurry vision in her eyes from dryness. No h/o uveitis.     has dry mouth, drinks water.     Gets T  F sometimes. It is sporadic.     Lost hair.     Gets intermittent CP. Had several hospital visits and admissions in the pst for it. lorazepam helped witch chest spasms, she does not like pain meds.     She was prescribed 0.5 mgq d prn, 10 tbs/monthss.     She gets dry cough, just started using albuterol inhaler, it helps.     No SOB.     Has gastroparesis, IBS  nd h/o severas admission for constiption, colon blockage with impaction and gets bloating. has lost follow up with GI.     She is working with  to get medical assistance to get insurance back by early next year. She filed it again.     Gets botox inj every 3 months nd they help, has to cancel 10/20 botox inj s will not have insurance. they came back yesterday.     last seizure waas last year. still gets black out spells, salts ne was last week.    derm eye gi     FHx: Both parents have RA.  SHx: She was working in a [employer] shop, it was closed because of pandemic. sexually active, not on oral contraceptives. She thinks she had a misccraaaiaage last wk, had n period x2 months then mueller  bleeding a lot, dark red and was different from period. Felt something came out that she feels she miscarried, no pos pregnancy test. No smoking. rarely drinks ETOH.  [facility]   thumbs between MCP tender   otezla helped with skin lesions, oral ulcers, joint pain.  colcrys  sjjhoanae alondra quinonez allisonaren [phone]        8/11/2022: aSmara presents for urgent visit to discuss m/o Behcet's flare during pregnancy, she is   She is pregnant 10 wk 3 days with ADRIAN 3/6/2023.  In 2020, had 1st trimester miscarriage.  Has LBP, ankle pain/swelling.   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 Discussed with MFM, back on low dose otezla since last week, only 1 tab a day.    Interval History  10/28/2022  Mrs. Oropeza was last seen 08/11/22. At that time she was advised to continue otezla (1 tablet BID) and remain off colcrys. Since that time, she has instead taken otezla 1 tablet daily. She notes more joint pain in hands, wrists, cervical spine, knees with 4 hours of monring stiffness. Previously <1hr with full strength.     Had a few genital ulcers recently which only lasted a few days. Oral ulcers have been more active of late and are currently healing.     Abdominal pain on L side, pain is always worse after eating. Out of the phase of her pregnancy with morning sickness, at 22w on Monday.     She describes a few fevers at the end of September with hot flashes and cold sweats with Tmax 100. Called OB-GYN who instructed she should go in if persistent, though it resolved within a few days.     More easy bruising - thighs, she is currently on lovenox and baby aspirin. Previous history of clots especially with interventions. She recently had an issue with superficial thrombophlebitis following a blood draw.     Seeing more floaters in her vision. Sometimes tiny and clear, but can also be larger and 'gray', up to 1/4 of her vision. Has not seen  Ophthalmology in many years.     More frequent migraines - did a nerve block recently but this has worn off, previous botox injections.     ADRIAN 03/06/23.    1/25/23:    Baby is growing small. Had high BP yesterday, they would monitor it. DBP was 90.    Increasing otezla to bid helped. It helped with ulcers. Has skin breakdown since Christmas, never had it like this over her chest, but still it feels related to Behcet.    Hands and ankles are painful and swoleln, ankles are new but had hand pain with behcet before,. This started fater entering 3rd [facility]. Her ADRIAN might be earlier based on baby's growth, induction at 37 wk, progress at 39. nex twk will have another check.    Still has the neck pain. Still gets eye floaters.    6/21/2023:    She delivered 1 month early due to pre-eclampsia. Had Mg infusion, had bleeding issues, spent a week. Was on BP med till a month ago, now stable. Was induced, had NVD. No blood transfusions needed.    Her baby girl Lashanda was born 2/12/2023, small, slow growth but healthy with no fetal deformities. Had vaginal sores after giving birth. Still gets mouth ulcers, one oral ulcer now, no vaginal ulcers now. Plans to breast feed x 7 months. Does breastfeeding. Saw OB/GYN in 4/2023 at [facility]. They are aware she is on otezla.    She was on ASA 81 mg every day during pregnancy.    Today 9/27/23: Doing well, less behcet flares since giving birth. Most recent flare on her chest, now healing. No new oral or vaginal ulcers.     She has been having back pain that shoots down her right leg since June and has been utilizing PT without improvement.     She also has had swelling and pain in both hands and right wrist, as well as both ankles L>R.     She notes missing some doses of otezla due to running out of refills and avoiding taking it without food. She has been eating less because she has been more tired since having her daughter.        Review of Systems    A comprehensive ROS was  done. Positives are per HPI.    Past Medical History   Past Medical History:   Diagnosis Date     Anemia      Anxiety      Arthritis      Behcet's disease (H)      Cervical adenitis May 2010     Chronic abdominal pain      Constipation, chronic 1994     Fibromyalgia      Gastro-oesophageal reflux disease      Gastroparesis      Irregular heart beat     tachycardia, has had workup     Migraines      Neuromuscular disorder (H)     fibramyalgia     Palpitations      PONV (postoperative nausea and vomiting)      Seizure (H)      Seizures (H)     unknown etiology     Syncope      Tourette's       Past Surgical History:   Procedure Laterality Date     ARTHROSCOPY ANKLE, OPEN REPAIR LIGAMENT, COMBINED Left 9/25/2019    Procedure: LEFT ANKLE ARTHROSCOPY WITH LIGAMENT REPAIR;  Surgeon: Andres Johnson DPM;  Location:  OR     ARTHROSCOPY ANKLE, REPAIR LIGAMENT Left 1/2/2019    Procedure: Ankle arthroscopy and sinus tarsi evacuation, ligament repair, left lower extremity;  Surgeon: Andres Johnson DPM;  Location:  OR     ARTHROSCOPY KNEE WITH PATELLAR REALIGNMENT  7/25/2013    Procedure: ARTHROSCOPY KNEE WITH PATELLAR REALIGNMENT;  Left Knee Arthroscopy, Medial Patellofemoral Ligament Reconstruction with Allograft  ;  Surgeon: Jennifer Acevedo MD;  Location: [facility]     COLONOSCOPY  2015     DENTAL SURGERY  1996    Teeth removal     ENDOSCOPY UPPER, COLONOSCOPY, COMBINED  2005     HC ESOPH/GAS REFLUX TEST W NASAL IMPED >1 HR N/A 2/15/2017    Procedure: ESOPHAGEAL IMPEDENCE FUNCTION TEST WITH 24 HOUR PH GREATER THAN 1 HOUR;  Surgeon: Timothy Matta MD;  Location: [facility]     IR PICC PLACEMENT > 5 YRS OF AGE  3/13/2019     IR UPPER EXTREMITY VENOGRAM LEFT  2/25/2020     IRRIGATION AND DEBRIDEMENT FOOT, COMBINED Left 3/12/2019    Procedure: COMBINED IRRIGATION AND DEBRIDEMENT LEFT ANKLE;  Surgeon: Micha Glover MD;  Location: [facility]     IRRIGATION AND DEBRIDEMENT LOWER EXTREMITY, COMBINED Left  5/7/2019    Procedure: 1.  Excision of wound down to and including deep fascia, less than 20 cm2.  2.  Irrigation and debridement, left ankle.;  Surgeon: Andres Johnson DPM;  Location: [facility] OR     PICC INSERTION Left 05/05/2019    4Fr - 45cm (5cm external), Basilic vein, SVC RA junction      Patient Active Problem List    Diagnosis Date Noted     Preeclampsia 02/10/2023     Priority: Medium     Infection of joint of ankle (H) 05/06/2019     Priority: Medium     Cellulitis 03/09/2019     Priority: Medium     Displacement of lumbar intervertebral disc without myelopathy 11/13/2018     Priority: Medium     Overview:   Created by Conversion       Iron deficiency associated with nonfamilial restless legs syndrome 11/13/2018     Priority: Medium     Enthesopathy of hip region 11/13/2018     Priority: Medium     Overview:   Created by Conversion       Tourette's syndrome 11/13/2018     Priority: Medium     Overview:   Created by Conversion       Somatic symptom disorder 06/01/2018     Priority: Medium     Pelvic floor weakness 04/25/2018     Priority: Medium     Spells of decreased attentiveness 12/19/2017     Priority: Medium     Mobile cecum 11/09/2017     Priority: Medium     Cecum noted in Right lower quadrant on 4/17 CT scan, and in Left upper Quadrant on CT on 11/2017.       Vitamin D deficiency 10/11/2017     Priority: Medium     How low, unknown/not found. On D when tested at 27. Starting cholecalciferol October 2017. Needs recheck.       Convulsions, unspecified convulsion type (H) 10/03/2017     Priority: Medium     Transient alteration of awareness 10/03/2017     Priority: Medium     Chronic pain syndrome 07/27/2017     Priority: Medium     Major depressive disorder, recurrent episode, moderate (H) 06/27/2017     Priority: Medium     Cervical pain 05/02/2017     Priority: Medium     Acute left ankle pain 03/31/2017     Priority: Medium     Cervical dystonia 03/28/2017     Priority: Medium     PTSD  (post-traumatic stress disorder) 01/17/2017     Priority: Medium     Patellofemoral instability 10/20/2016     Priority: Medium     Fibromyalgia 08/04/2016     Priority: Medium     Rheumatoid arthritis of multiple sites without rheumatoid factor (H) 08/04/2016     Priority: Medium     Raynaud's disease without gangrene 08/04/2016     Priority: Medium     Chronic abdominal pain 08/04/2016     Priority: Medium     Palpitations 01/12/2016     Priority: Medium     On colchicine therapy 10/30/2015     Priority: Medium     Spell of shaking 05/06/2015     Priority: Medium     Migraine 02/04/2015     Priority: Medium     Behcet's disease (H) 12/10/2014     Priority: Medium     Headaches due to old head injury 11/12/2013     Priority: Medium     Milk protein intolerance 10/11/2013     Priority: Medium     Intestinal malabsorption 10/11/2013     Priority: Medium     Concussion 02/13/2013     Priority: Medium     Jan 2013, with prolonged recovery- followed by sports med         Knee pain 01/03/2013     Priority: Medium     Generalized anxiety disorder 06/25/2009     Priority: Medium     Tics - Tourette syndrome 05/18/2009     Priority: Medium     Followed by psychotherapy. Symptoms well managed. Originally diagnosed at [facility]. (Dr. Simpson)           IBS (irritable bowel syndrome) 05/18/2009     Priority: Medium     Allergic rhinitis 05/18/2009     Priority: Medium     GERD (gastroesophageal reflux disease) 01/10/2008     Priority: Medium     Gastroparesis 1994     Priority: Medium      Family History   Problem Relation Age of Onset     Depression Mother      Neurologic Disorder Mother         Migraines, take imitrex injection.  Also in maternal grandmother.       Alcohol/Drug Father      Hypertension Father      Depression Father      Osteoarthritis Father      Cardiovascular Maternal Grandmother      Depression Maternal Grandmother      Hypertension Maternal Grandmother      Alzheimer Disease Maternal  Grandmother      Cardiovascular Maternal Grandfather      Hypertension Maternal Grandfather      Depression Maternal Grandfather      Alcohol/Drug Maternal Grandfather      Diabetes Maternal Grandfather      Cardiovascular Paternal Grandmother      Hypertension Paternal Grandmother      Cardiovascular Paternal Grandfather      Hypertension Paternal Grandfather      Glaucoma No family hx of      Macular Degeneration No family hx of       Allergies   Allergen Reactions     Amoxil [Penicillins] Rash     Dad unsure of reaction.     Bee Venom Anaphylaxis     Bioflavonoids Anaphylaxis     Citrus Anaphylaxis     All [place]     Contrast Dye Rash     Contrast Media [place] [facility] [place] Contrast Media [place] [facility] [place].  NOTE: this is a contrast media oral with iodine. Premedicate with methylpred standard for IV contrast, request barium contrast for oral contrast.     Iodinated Contrast Media Hives and Rash     Contrast Media Ready-Box [facility] [place].; Contrast Media Ready-[place] [facility] [place].  NOTE: this is a contrast media oral with iodine. Premedicate with methylpred standard for IV contrast, request barium contrast for oral contrast.     Pineapple Anaphylaxis, Difficulty breathing and Rash     Reglan [Metoclopramide] Other (See Comments)     IV dose only, in ER, rapid heart rate.     Ace Inhibitors      Difficulty in breathing and GI upset     Amitiza [Lubiprostone] Nausea and Vomiting     Amoxicillin-Pot Clavulanate      Midazolam Unknown     parent states that when pt takes this medication, she wakes up being very violent .     Midazolam Hcl      Coming out of pelvic exam at age of 5, was kicking and screaming when coming out of the versed.     Other [No Clinical Screening - See Comments]      Bleech/ chest tightness, itchy throat, swollen tongue, hives     Tizanidine Other (See Comments)     Confusion, back pain, photophobia, abdominal pain, shaking, anxious       Adhesive Tape Rash     Azithromycin  Hives and Rash     Cephalexin Itching and Rash     Sulfa Antibiotics Rash     Skin scarring      Current Outpatient Medications   Medication Sig Dispense Refill     albuterol (PROAIR HFA/PROVENTIL HFA/VENTOLIN HFA) 108 (90 Base) MCG/ACT inhaler Inhale 2 puffs into the lungs every 6 hours as needed for shortness of breath / dyspnea or wheezing 1 Inhaler 1     amitriptyline (ELAVIL) 25 MG tablet TAKE 1 TABLET BY MOUTH EVERY NIGHT AT BEDTIME 90 tablet 1     apremilast (OTEZLA) 30 MG tablet Take 1 tablet (30 mg) by mouth 2 times daily Hold for signs of infection, and seek medical attention. 60 tablet 5     artificial tears OINT ophthalmic ointment 0.5 inch strip each eye at night 1 Tube 11     azelaic acid (FINACIA) 15 % external gel Once daily to scars, upper chest and small portion of back and spot on belly button. Hold if irritating 50 g 5     benzocaine (AMERICAINE) 20 % external aerosol Apply to perineum four times daily as needed for pain 57 g 3     benzocaine (TOPICALE XTRA) 20 % GEL Apply as needed locally to mouth or nasal ulcers for pain; 4 times daily as needed 30 g 1     betamethasone valerate (VALISONE) 0.1 % cream Apply topically 2 times daily as needed        bisacodyl (DULCOLAX) 5 MG EC tablet Take 2 tablets (10 mg) by mouth daily as needed for constipation 30 tablet 0     celecoxib (CELEBREX) 100 MG capsule Take 1 capsule (100 mg) by mouth 2 times daily as needed for pain 60 capsule 1     citalopram (CELEXA) 20 MG tablet Take 1 tablet (20 mg) by mouth daily 90 tablet 1     EPINEPHrine (EPIPEN 2-EAMON) 0.3 MG/0.3ML injection Inject 0.3 mLs (0.3 mg) into the muscle as needed for anaphylaxis 0.6 mL 3     fluocinonide (LIDEX) 0.05 % external gel Apply topically 2 times daily 60 g 11     gabapentin (NEURONTIN) 300 MG capsule Take 1 capsule (300 mg) by mouth every morning 60 capsule 1     hydrocortisone, Perianal, (ANUSOL-HC) 2.5 % cream Place rectally 3 times daily as needed for hemorrhoids 30 g 0      "hypromellose (GENTEAL) 0.3 % SOLN 1 drop every hour as needed for dry eyes        lanolin ointment Apply topically every hour as needed for other (sore nipples) 7 g 3     lidocaine (LMX4) 4 % external cream Apply topically once as needed for mild pain 120 g 1     LINZESS 290 MCG capsule TAKE 1 CAPSULE BY MOUTH EVERY DAY IN THE MORNING BEFORE BREAKFAST 90 capsule 0     mometasone (ELOCON) 0.1 % external ointment Apply topically 2 times daily 45 g 11     polyethylene glycol (MIRALAX/GLYCOLAX) powder Take 1 capful by mouth 3 times daily       Prenatal MV-Min-Fe Fum-FA-DHA (PRENATAL 1 PO)        sodium fluoride 1.1 % CREA Apply 1 Application topically daily       lactulose 20 GM/30ML SOLN Take 30 mLs by mouth 3 times daily as needed (for constipation) (Patient not taking: Reported on 5/12/2023) 300 mL 3     sucralfate (CARAFATE) 1 GM/10ML suspension Take 10 mLs (1 g) by mouth 4 times daily (Patient not taking: Reported on 5/12/2023) 1200 mL 2     triamcinolone (KENALOG) 0.1 % external ointment Apply twice daily as needed to lesions on the genitals and body. OK to use very sparingly on the face. (Patient not taking: Reported on 9/27/2023) 454 g 2           Physical Exam   /71   Pulse 116   Ht 1.6 m (5' 3\")   Wt 69.5 kg (153 lb 4.8 oz)   BMI 27.16 kg/m    GA: NAD, pleasant  HEENT: nl sclera/conj, no oral ulcers appreciated  Cardio: RRR, no M/R/G  Pulm: Lungs cl to auscultation bilaterally  Abdomen: soft, NT  MSK: mild bilateral hand and digit swelling, discomfort w pressure. Bilateral ankle swelling L>R  Skin: no rash  Neuro: non focal  Psych: non focal      There is currently no information documented on the homunculus. Go to the Rheumatology activity and complete the homunculus joint exam.  Joint Exam 09/27/2023     No joint exam has been documented for this visit          Data   Data      10/28/2022   BEAUCHAMP-28 (ESR) --   BEAUCHAMP-28 (CRP) --   Tender (BEAUCHAMP-28) 2 / 28    Swollen (BEAUCHAMP-28) 0 / 28    Provider Global -- "   Patient Global --   ESR 17 mm/hr   CRP --     No results found for any visits on 09/27/23.  CBC RESULTS: Recent Labs   Lab Test 09/07/20  1147   WBC 4.1   RBC 5.09   HGB 13.0   HCT 43.0   MCV 85   MCH 25.5*   MCHC 30.2*   RDW 15.0        TSH   Date Value Ref Range Status   09/07/2020 0.77 0.40 - 4.00 mU/L Final   03/21/2019 0.53 0.40 - 4.00 mU/L Final   10/30/2018 1.06 0.40 - 4.00 mU/L Final   11/26/2016 0.87 0.40 - 4.00 mU/L Final     T4 Free   Date Value Ref Range Status   01/31/2007 1.26 0.70 - 1.85 ng/dL Final     Rheumatoid Factor   Date Value Ref Range Status   09/30/2020 <7 <12 IU/mL Final   05/06/2016 <20 <20 IU/mL Final       Reviewed Rheumatology lab flowsheet

## 2023-09-27 NOTE — NURSING NOTE
"Chief Complaint   Patient presents with    RECHECK     Follow up with RA     /71   Pulse 116   Ht 1.6 m (5' 3\")   Wt 69.5 kg (153 lb 4.8 oz)   BMI 27.16 kg/m    Gabriella Chun CMA on 9/27/2023 at 2:41 PM    "

## 2023-12-05 ENCOUNTER — OFFICE VISIT (OUTPATIENT)
Dept: PHYSICAL MEDICINE AND REHAB | Facility: CLINIC | Age: 28
End: 2023-12-05
Payer: COMMERCIAL

## 2023-12-05 VITALS — DIASTOLIC BLOOD PRESSURE: 67 MMHG | SYSTOLIC BLOOD PRESSURE: 108 MMHG | HEART RATE: 109 BPM

## 2023-12-05 DIAGNOSIS — G43.719 CHRONIC MIGRAINE WITHOUT AURA, INTRACTABLE, WITHOUT STATUS MIGRAINOSUS: ICD-10-CM

## 2023-12-05 DIAGNOSIS — M54.81 BILATERAL OCCIPITAL NEURALGIA: Primary | ICD-10-CM

## 2023-12-05 PROCEDURE — 95874 GUIDE NERV DESTR NEEDLE EMG: CPT | Performed by: PHYSICAL MEDICINE & REHABILITATION

## 2023-12-05 PROCEDURE — 64405 NJX AA&/STRD GR OCPL NRV: CPT | Mod: XS | Performed by: PHYSICAL MEDICINE & REHABILITATION

## 2023-12-05 PROCEDURE — 64615 CHEMODENERV MUSC MIGRAINE: CPT | Performed by: PHYSICAL MEDICINE & REHABILITATION

## 2023-12-05 RX ORDER — SULFAMETHOXAZOLE/TRIMETHOPRIM 800-160 MG
1 TABLET ORAL
COMMUNITY
Start: 2023-12-03 | End: 2023-12-10

## 2023-12-05 RX ORDER — RIZATRIPTAN BENZOATE 5 MG/1
TABLET, ORALLY DISINTEGRATING ORAL
COMMUNITY
Start: 2023-05-12

## 2023-12-05 RX ORDER — HYDROXYZINE HYDROCHLORIDE 25 MG/1
TABLET, FILM COATED ORAL
COMMUNITY
Start: 2023-03-23

## 2023-12-05 RX ORDER — ONDANSETRON 8 MG/1
1 TABLET, ORALLY DISINTEGRATING ORAL EVERY 8 HOURS PRN
COMMUNITY
Start: 2022-08-09 | End: 2024-06-05

## 2023-12-05 RX ORDER — METHYLPREDNISOLONE SODIUM SUCCINATE 40 MG/ML
40 INJECTION, POWDER, LYOPHILIZED, FOR SOLUTION INTRAMUSCULAR; INTRAVENOUS ONCE
Status: COMPLETED | OUTPATIENT
Start: 2023-12-05 | End: 2023-12-05

## 2023-12-05 RX ADMIN — METHYLPREDNISOLONE SODIUM SUCCINATE 40 MG: 40 INJECTION, POWDER, LYOPHILIZED, FOR SOLUTION INTRAMUSCULAR; INTRAVENOUS at 14:16

## 2023-12-05 NOTE — LETTER
"    12/5/2023         RE: Samara Oropeza  [address]  [place] [address]        Dear Colleague,    Thank you for referring your patient, Samara Oropeza, to the [facility]. Please see a copy of my visit note below.      [facility]    PM&R CLINIC NOTE  BOTULINUM TOXIN PROCEDURE      HPI  Chief Complaint   Patient presents with     Procedure     Botox     Samara Oropeza is a 28 year old female who presents to clinic for botulinum toxin injections for management of chronic migraine headaches.     SINCE LAST VISIT  Samara Oropeza was last seen here in clinic on 9/12/2023, at which time she received 200 units of Botox.     Patient denies new medical diagnoses, illnesses, hospitalizations, emergency room visits, and injuries since the previous injection with botulinum neurotoxin.    RESPONSE TO PREVIOUS TREATMENT    Side effects: No problems reported    1.  Headache frequency during this injection cycle: The first month was \"really rough\" with almost daily migraine headaches, with an improvement down to her usual 1-4 migraine headache days per month over the last two months. She has currently gone 3.5 weeks with no migraine headaches. This is compared to her baseline headache frequency of 30 headache days per month.     2.  Headache duration during this injection cycle:  Headache duration was usually a few hours to a few days . Patient reports a few episodes of multiple day headaches during this injection cycle, particularly as the Botox was wearing off.     3.  Headache intensity during this injection cycle:    A.  7/10  =  Typical pain level.  B.  10/10  =  Worst pain level.    C.  0/10  =  Lowest pain level.    4.  Change in headache medication usage during this injection cycle:  (For Example:  Able to decrease use of oral pain medications.) She has been taking Tylenol and ibuprofen as needed for her migraine " headaches. She will take rizatriptan if the OTC medications do not work. She has taken more rizatriptan this injection cycle due to more headaches.     5.  ER Visits During This Injection Cycle:  None.     6.  Functional Performance:  Change in ADL's, social interaction, days lost from work, etc. Patient reports being able to more fully participate in social and family activities and responsibilities as headache symptoms have improved      PHYSICAL EXAM  VS: /67 (BP Location: Right arm, Patient Position: Sitting)   Pulse 109    GEN: Pleasant and cooperative, in no acute distress  HEENT: No facial asymmetry    ALLERGIES  Allergies   Allergen Reactions     Amoxil [Penicillins] Rash     Dad unsure of reaction.     Bee Venom Anaphylaxis     Bioflavonoids Anaphylaxis     Citrus Anaphylaxis     [place]     Contrast Dye Rash     [place]rast Media [place] [facility] [place]rast Media [place] [place] [facility] [place]  NOTE: this is a contrast media oral with iodine. Premedicate with methylpred standard for IV contrast, request barium contrast for oral contrast.     Iodinated Contrast Media Hives and Rash     Contrast Media [facility] [place] [facility] [place] Contrast Media [facility] [place] [facility] [place].  NOTE: this is a contrast media oral with iodine. Premedicate with methylpred standard for IV contrast, request barium contrast for oral contrast.     Pineapple Anaphylaxis, Difficulty breathing and Rash     Reglan [Metoclopramide] Other (See Comments)     IV dose only, in ER, rapid heart rate.     Ace Inhibitors      Difficulty in breathing and GI upset     Amitiza [Lubiprostone] Nausea and Vomiting     Amoxicillin-Pot Clavulanate      Midazolam Unknown     parent states that when pt takes this medication, she wakes up being very violent .     Midazolam Hcl      Coming out of pelvic exam at age of 5, was kicking and screaming when coming out of the versed.     Other [No Clinical Screening - See Comments]      Bleech/ chest  tightness, itchy throat, swollen tongue, hives     Tizanidine Other (See Comments)     Confusion, back pain, photophobia, abdominal pain, shaking, anxious       Adhesive Tape Rash     Azithromycin Hives and Rash     Cephalexin Itching and Rash     Sulfa Antibiotics Rash     Skin scarring       CURRENT MEDICATIONS    Current Outpatient Medications:      albuterol (PROAIR HFA/PROVENTIL HFA/VENTOLIN HFA) 108 (90 Base) MCG/ACT inhaler, Inhale 2 puffs into the lungs every 6 hours as needed for shortness of breath / dyspnea or wheezing, Disp: 1 Inhaler, Rfl: 1     amitriptyline (ELAVIL) 25 MG tablet, TAKE 1 TABLET BY MOUTH EVERY NIGHT AT BEDTIME, Disp: 90 tablet, Rfl: 1     apremilast (OTEZLA) 30 MG tablet, Take 1 tablet (30 mg) by mouth 2 times daily Hold for signs of infection, and seek medical attention., Disp: 60 tablet, Rfl: 5     artificial tears OINT ophthalmic ointment, 0.5 inch strip each eye at night, Disp: 1 Tube, Rfl: 11     azelaic acid (FINACIA) 15 % external gel, Once daily to scars, upper chest and small portion of back and spot on belly button. Hold if irritating, Disp: 50 g, Rfl: 5     benzocaine (AMERICAINE) 20 % external aerosol, Apply to perineum four times daily as needed for pain, Disp: 57 g, Rfl: 3     benzocaine (TOPICALE XTRA) 20 % GEL, Apply as needed locally to mouth or nasal ulcers for pain; 4 times daily as needed, Disp: 30 g, Rfl: 1     betamethasone valerate (VALISONE) 0.1 % cream, Apply topically 2 times daily as needed , Disp: , Rfl:      bisacodyl (DULCOLAX) 5 MG EC tablet, Take 2 tablets (10 mg) by mouth daily as needed for constipation, Disp: 30 tablet, Rfl: 0     celecoxib (CELEBREX) 100 MG capsule, Take 1 capsule (100 mg) by mouth 2 times daily as needed for pain, Disp: 60 capsule, Rfl: 1     citalopram (CELEXA) 20 MG tablet, Take 1 tablet (20 mg) by mouth daily, Disp: 90 tablet, Rfl: 1     EPINEPHrine (EPIPEN 2-EAMON) 0.3 MG/0.3ML injection, Inject 0.3 mLs (0.3 mg) into the muscle as  needed for anaphylaxis, Disp: 0.6 mL, Rfl: 3     etonogestrel (NEXPLANON) 68 MG IMPL, Inject 68 mg Subcutaneous, Disp: , Rfl:      fluocinonide (LIDEX) 0.05 % external gel, Apply topically 2 times daily, Disp: 60 g, Rfl: 11     gabapentin (NEURONTIN) 300 MG capsule, Take 1 capsule (300 mg) by mouth every morning, Disp: 60 capsule, Rfl: 1     hydrocortisone, Perianal, (ANUSOL-HC) 2.5 % cream, Place rectally 3 times daily as needed for hemorrhoids, Disp: 30 g, Rfl: 0     hydrOXYzine HCl (ATARAX) 25 MG tablet, TAKE ONE OR TWO TABLETS BY MOUTH EVERY SIX HOURS AS NEEDED, Disp: , Rfl:      hypromellose (GENTEAL) 0.3 % SOLN, 1 drop every hour as needed for dry eyes , Disp: , Rfl:      lactulose 20 GM/30ML SOLN, Take 30 mLs by mouth 3 times daily as needed (for constipation), Disp: 300 mL, Rfl: 3     lanolin ointment, Apply topically every hour as needed for other (sore nipples), Disp: 7 g, Rfl: 3     lidocaine (LMX4) 4 % external cream, Apply topically once as needed for mild pain, Disp: 120 g, Rfl: 1     LINZESS 290 MCG capsule, TAKE 1 CAPSULE BY MOUTH EVERY DAY IN THE MORNING BEFORE BREAKFAST, Disp: 90 capsule, Rfl: 0     mometasone (ELOCON) 0.1 % external ointment, Apply topically 2 times daily, Disp: 45 g, Rfl: 11     ondansetron (ZOFRAN ODT) 8 MG ODT tab, Take 1 tablet by mouth every 8 hours as needed, Disp: , Rfl:      polyethylene glycol (MIRALAX/GLYCOLAX) powder, Take 1 capful by mouth 3 times daily, Disp: , Rfl:      Prenatal MV-Min-Fe Fum-FA-DHA (PRENATAL 1 PO), , Disp: , Rfl:      rizatriptan (MAXALT-MLT) 5 MG ODT, DISSOLVE 1 OR 2 TABLETS IN THE MOUTH AS NEEDED FOR HEADACHE AT ONSET OF MIGRAINE, MAY REPEAT IN 2 HOURS AS NEEDED. MAX 30MG IN 24 HOURS AND MAX 5 DAYS PER MONTH, Disp: , Rfl:      sodium fluoride 1.1 % CREA, Apply 1 Application topically daily, Disp: , Rfl:      sucralfate (CARAFATE) 1 GM/10ML suspension, Take 10 mLs (1 g) by mouth 4 times daily, Disp: 1200 mL, Rfl: 2     sulfamethoxazole-trimethoprim  (BACTRIM DS) 800-160 MG tablet, Take 1 tablet by mouth, Disp: , Rfl:      triamcinolone (KENALOG) 0.1 % external ointment, Apply twice daily as needed to lesions on the genitals and body. OK to use very sparingly on the face., Disp: 454 g, Rfl: 2    Current Facility-Administered Medications:      botulinum toxin type A (BOTOX) 100 units injection 200 Units, 200 Units, Intramuscular, Q90 Days, Alejandrina Hernandez MD, 200 Units at 23 1826     triamcinolone (KENALOG-40) injection 40 mg, 40 mg, , , Fleischmann, Andres, DPM, 40 mg at 10/11/21 0928     triamcinolone (KENALOG-40) injection 40 mg, 40 mg, , , Fleischmann, Andres, DPM, 40 mg at 06/15/21 0957     triamcinolone (KENALOG-40) injection 40 mg, 40 mg, , , Fleischmann, Andres, DPM, 40 mg at 09/15/20 1554       BOTULINUM NEUROTOXIN INJECTION PROCEDURES    VERIFICATION OF PATIENT IDENTIFICATION AND PROCEDURE     Initials   Patient Name SES   Patient  SES   Procedure Verified by: SES     Prior to the start of the procedure and with procedural staff participation, I verbally confirmed the patient s identity using two indicators, relevant allergies, that the procedure was appropriate and matched the consent or emergent situation, and that the correct equipment/implants were available. Immediately prior to starting the procedure I conducted the Time Out with the procedural staff and re-confirmed the patient s name, procedure, and site/side. (The Joint Commission universal protocol was followed.)  Yes    Sedation (Moderate or Deep): None    ABOVE ASSESSMENTS PERFORMED BY    Alejandrina Hernandez MD      INDICATIONS FOR PROCEDURES  Samara Oropeza is a 28 year old patient with pain secondary to the diagnosis of chronic migraine headaches. Her baseline symptoms have been recalcitrant to oral medications and conservative therapy.  She is here today for reinjection with Botox.    GOAL OF PROCEDURE  The goal of this procedure is to decrease pain.      TOTAL DOSE  ADMINISTERED  Dose Administered:  200 units  Botox (Botulinum Toxin Type A)       2:1 Dilution   Unavoidable Drug Waste: No.  Diluent Used:  Preservative Free Normal Saline  Total Volume of Diluent Used:  4 ml  NDC #: Botox 100u ([address]-23)      CONSENT  The risks, benefits, and treatment options were discussed with Samara Oropeza and she agreed to proceed.    Written consent was obtained by  [facility] .     EQUIPMENT USED  Needle-25mm stimulating/recording  Needle-30 gauge  EMG/NCS Machine    SKIN PREPARATION  Skin preparation was performed using an alcohol wipe.    GUIDANCE DESCRIPTION  Electro-myographic guidance was necessary throughout the neck portion of the procedure to accurately identify all areas of spastic muscles while avoiding injection of non-spastic muscles, neighboring nerves and nearby vascular structures.       AREA/MUSCLE INJECTED:  200 UNITS BOTOX = TOTAL DOSE, 2:1 DILUTION     1. SHOULDER GIRDLE & NECK MUSCLES: 60 UNITS BOTOX = TOTAL DOSE     Right Splenius Cervicis - 5 units of Botox over 2 site/s.   Left Splenius Cervicis - 5 units of Botox over 2 site/s.     Right Rectus Capitis - 2.5 units of Botox at 1 site.  Left Rectus Capitis - 2.5 units of Botox at 1site.     Right Levator Scapulae - 10 units of Botox over 2 site/s.   Left Levator Scapulae - 10 units of Botox over 2 site/s.    Right Anterior Scalene - 2.5 units of Botox over 1 site/s.   Left Anterior Scalene - 2.5 units of Botox over 1 site/s.     Right Sternocleidomastoid - 2.5 units of Botox over 1 site/s.   Left Sternocleidomastoid - 2.5 units of Botox over 1 site/s.     Right Rhomboid Major - 5 units of Botox over 1 site/s.    Left Rhomboid Major - 5 units of Botox over 1 site/s.      Right Pectoralis Minor - 2.5 units of Botox over 1 site/s.    Left Pectoralis Minor - 2.5 units of Botox over 1 site/s.     2. FACIAL & SCALP MUSCLES: 140 UNITS BOTOX = TOTAL DOSE     Right Occipitalis - 5 units of Botox over 1 site/s.   Left  Occipitalis - 5 units of Botox over 1 site/s.     Right Frontalis - 10 units of Botox over 2 site/s.  Left Frontalis - 10 units of Botox over 2 site/s.     Right Temporalis - 50 units of Botox over 8 site/s.  Left Temporalis - 50 units of Botox over 8 site/s.     Right  - 2.5 units of Botox over 1 site/s.              Left  - 2.5 units of Botox over 1 site/s.                 Procerus - 5 units of Botox at 1 site/s.      BILATERAL GREATER OCCIPITAL NERVE BLOCKS:  Dru% lidocaine: 9 ml  Methylprednisolone: 40 mg = 1 ml       Area just inferior to insertion of the right and left superior trapezius insertion onto skull was cleansed with ChloraPrep. Needle was advanced anteriorly to base of skull then slightly withdrawn and injectate was injected in a fan-like distribution at different depths. A 10 ml mixture of 1% lidocaine, and methylprednisolone was divided into two 5 ml syringes. Total injection volume = 5 ml per side.       RESPONSE TO PROCEDURE  Samara Oropeza tolerated the procedure well and there were no immediate complications. She was allowed to recover for an appropriate period of time and was discharged home in stable condition.    ASSESSMENT AND PLAN   Botulinum toxin injections: No changes made to Botox dose or distribution today. Occipital nerve blocks also administered per patient request. Patient will continue to monitor response to Botox and report at next appointment.   Referrals: None.   Follow up: Samara Oropeza was rescheduled for the next series of injections in 12 weeks, at which time we will evaluate response to today's injections. she may call the clinic prior with any questions or concerns prior to the next appointment.       Again, thank you for allowing me to participate in the care of your patient.        Sincerely,        Alejandrina Hernandez MD

## 2023-12-05 NOTE — PROGRESS NOTES
"  [facility]    PM&R CLINIC NOTE  BOTULINUM TOXIN PROCEDURE      HPI  Chief Complaint   Patient presents with    Procedure     Botox     Samara Oropeza is a 28 year old female who presents to clinic for botulinum toxin injections for management of chronic migraine headaches.     SINCE LAST VISIT  Samara Oropeza was last seen here in clinic on 9/12/2023, at which time she received 200 units of Botox.     Patient denies new medical diagnoses, illnesses, hospitalizations, emergency room visits, and injuries since the previous injection with botulinum neurotoxin.    RESPONSE TO PREVIOUS TREATMENT    Side effects: No problems reported    1.  Headache frequency during this injection cycle: The first month was \"really rough\" with almost daily migraine headaches, with an improvement down to her usual 1-4 migraine headache days per month over the last two months. She has currently gone 3.5 weeks with no migraine headaches. This is compared to her baseline headache frequency of 30 headache days per month.     2.  Headache duration during this injection cycle:  Headache duration was usually a few hours to a few days . Patient reports a few episodes of multiple day headaches during this injection cycle, particularly as the Botox was wearing off.     3.  Headache intensity during this injection cycle:    A.  7/10  =  Typical pain level.  B.  10/10  =  Worst pain level.    C.  0/10  =  Lowest pain level.    4.  Change in headache medication usage during this injection cycle:  (For Example:  Able to decrease use of oral pain medications.) She has been taking Tylenol and ibuprofen as needed for her migraine headaches. She will take rizatriptan if the OTC medications do not work. She has taken more rizatriptan this injection cycle due to more headaches.     5.  ER Visits During This Injection Cycle:  None.     6.  Functional Performance:  Change in ADL's, social interaction, days lost from work, " etc. Patient reports being able to more fully participate in social and family activities and responsibilities as headache symptoms have improved      PHYSICAL EXAM  VS: /67 (BP Location: Right arm, Patient Position: Sitting)   Pulse 109    GEN: Pleasant and cooperative, in no acute distress  HEENT: No facial asymmetry    ALLERGIES  Allergies   Allergen Reactions    Amoxil [Penicillins] Rash     Dad unsure of reaction.    Bee Venom Anaphylaxis    Bioflavonoids Anaphylaxis    Citrus Anaphylaxis     All Bowman    Contrast Dye Rash     Contrast Media Ready-Box [facility], 04/09/2014.; Contrast Media Ready-Box [facility], 04/09/2014.  NOTE: this is a contrast media oral with iodine. Premedicate with methylpred standard for IV contrast, request barium contrast for oral contrast.    Iodinated Contrast Media Hives and Rash     Contrast Media Ready-Box [facility], 04/09/2014.; Contrast Media Ready-Box [facility], 04/09/2014.  NOTE: this is a contrast media oral with iodine. Premedicate with methylpred standard for IV contrast, request barium contrast for oral contrast.    Pineapple Anaphylaxis, Difficulty breathing and Rash    Reglan [Metoclopramide] Other (See Comments)     IV dose only, in ER, rapid heart rate.    Ace Inhibitors      Difficulty in breathing and GI upset    Amitiza [Lubiprostone] Nausea and Vomiting    Amoxicillin-Pot Clavulanate     Midazolam Unknown     parent states that when pt takes this medication, she wakes up being very violent .    Midazolam Hcl      Coming out of pelvic exam at age of 5, was kicking and screaming when coming out of the versed.    Other [No Clinical Screening - See Comments]      Bleech/ chest tightness, itchy throat, swollen tongue, hives    Tizanidine Other (See Comments)     Confusion, back pain, photophobia, abdominal pain, shaking, anxious      Adhesive Tape Rash    Azithromycin Hives and Rash    Cephalexin Itching and Rash    Sulfa Antibiotics Rash     Skin scarring       CURRENT  MEDICATIONS    Current Outpatient Medications:     albuterol (PROAIR HFA/PROVENTIL HFA/VENTOLIN HFA) 108 (90 Base) MCG/ACT inhaler, Inhale 2 puffs into the lungs every 6 hours as needed for shortness of breath / dyspnea or wheezing, Disp: 1 Inhaler, Rfl: 1    amitriptyline (ELAVIL) 25 MG tablet, TAKE 1 TABLET BY MOUTH EVERY NIGHT AT BEDTIME, Disp: 90 tablet, Rfl: 1    apremilast (OTEZLA) 30 MG tablet, Take 1 tablet (30 mg) by mouth 2 times daily Hold for signs of infection, and seek medical attention., Disp: 60 tablet, Rfl: 5    artificial tears OINT ophthalmic ointment, 0.5 inch strip each eye at night, Disp: 1 Tube, Rfl: 11    azelaic acid (FINACIA) 15 % external gel, Once daily to scars, upper chest and small portion of back and spot on belly button. Hold if irritating, Disp: 50 g, Rfl: 5    benzocaine (AMERICAINE) 20 % external aerosol, Apply to perineum four times daily as needed for pain, Disp: 57 g, Rfl: 3    benzocaine (TOPICALE XTRA) 20 % GEL, Apply as needed locally to mouth or nasal ulcers for pain; 4 times daily as needed, Disp: 30 g, Rfl: 1    betamethasone valerate (VALISONE) 0.1 % cream, Apply topically 2 times daily as needed , Disp: , Rfl:     bisacodyl (DULCOLAX) 5 MG EC tablet, Take 2 tablets (10 mg) by mouth daily as needed for constipation, Disp: 30 tablet, Rfl: 0    celecoxib (CELEBREX) 100 MG capsule, Take 1 capsule (100 mg) by mouth 2 times daily as needed for pain, Disp: 60 capsule, Rfl: 1    citalopram (CELEXA) 20 MG tablet, Take 1 tablet (20 mg) by mouth daily, Disp: 90 tablet, Rfl: 1    EPINEPHrine (EPIPEN 2-EAMON) 0.3 MG/0.3ML injection, Inject 0.3 mLs (0.3 mg) into the muscle as needed for anaphylaxis, Disp: 0.6 mL, Rfl: 3    etonogestrel (NEXPLANON) 68 MG IMPL, Inject 68 mg Subcutaneous, Disp: , Rfl:     fluocinonide (LIDEX) 0.05 % external gel, Apply topically 2 times daily, Disp: 60 g, Rfl: 11    gabapentin (NEURONTIN) 300 MG capsule, Take 1 capsule (300 mg) by mouth every morning,  Disp: 60 capsule, Rfl: 1    hydrocortisone, Perianal, (ANUSOL-HC) 2.5 % cream, Place rectally 3 times daily as needed for hemorrhoids, Disp: 30 g, Rfl: 0    hydrOXYzine HCl (ATARAX) 25 MG tablet, TAKE ONE OR TWO TABLETS BY MOUTH EVERY SIX HOURS AS NEEDED, Disp: , Rfl:     hypromellose (GENTEAL) 0.3 % SOLN, 1 drop every hour as needed for dry eyes , Disp: , Rfl:     lactulose 20 GM/30ML SOLN, Take 30 mLs by mouth 3 times daily as needed (for constipation), Disp: 300 mL, Rfl: 3    lanolin ointment, Apply topically every hour as needed for other (sore nipples), Disp: 7 g, Rfl: 3    lidocaine (LMX4) 4 % external cream, Apply topically once as needed for mild pain, Disp: 120 g, Rfl: 1    LINZESS 290 MCG capsule, TAKE 1 CAPSULE BY MOUTH EVERY DAY IN THE MORNING BEFORE BREAKFAST, Disp: 90 capsule, Rfl: 0    mometasone (ELOCON) 0.1 % external ointment, Apply topically 2 times daily, Disp: 45 g, Rfl: 11    ondansetron (ZOFRAN ODT) 8 MG ODT tab, Take 1 tablet by mouth every 8 hours as needed, Disp: , Rfl:     polyethylene glycol (MIRALAX/GLYCOLAX) powder, Take 1 capful by mouth 3 times daily, Disp: , Rfl:     Prenatal MV-Min-Fe Fum-FA-DHA (PRENATAL 1 PO), , Disp: , Rfl:     rizatriptan (MAXALT-MLT) 5 MG ODT, DISSOLVE 1 OR 2 TABLETS IN THE MOUTH AS NEEDED FOR HEADACHE AT ONSET OF MIGRAINE, MAY REPEAT IN 2 HOURS AS NEEDED. MAX 30MG IN 24 HOURS AND MAX 5 DAYS PER MONTH, Disp: , Rfl:     sodium fluoride 1.1 % CREA, Apply 1 Application topically daily, Disp: , Rfl:     sucralfate (CARAFATE) 1 GM/10ML suspension, Take 10 mLs (1 g) by mouth 4 times daily, Disp: 1200 mL, Rfl: 2    sulfamethoxazole-trimethoprim (BACTRIM DS) 800-160 MG tablet, Take 1 tablet by mouth, Disp: , Rfl:     triamcinolone (KENALOG) 0.1 % external ointment, Apply twice daily as needed to lesions on the genitals and body. OK to use very sparingly on the face., Disp: 454 g, Rfl: 2    Current Facility-Administered Medications:     botulinum toxin type A (BOTOX)  100 units injection 200 Units, 200 Units, Intramuscular, Q90 Days, Alejandrina Hernandez MD, 200 Units at 23 1826    triamcinolone (KENALOG-40) injection 40 mg, 40 mg, , , Fleischmann, Andres, DPM, 40 mg at 10/11/21 0928    triamcinolone (KENALOG-40) injection 40 mg, 40 mg, , , Fleischmann, Andres, DPM, 40 mg at 06/15/21 0957    triamcinolone (KENALOG-40) injection 40 mg, 40 mg, , , Fleischmann, Andres, DPM, 40 mg at 09/15/20 1554       BOTULINUM NEUROTOXIN INJECTION PROCEDURES    VERIFICATION OF PATIENT IDENTIFICATION AND PROCEDURE     Initials   Patient Name SES   Patient  SES   Procedure Verified by: SES     Prior to the start of the procedure and with procedural staff participation, I verbally confirmed the patient s identity using two indicators, relevant allergies, that the procedure was appropriate and matched the consent or emergent situation, and that the correct equipment/implants were available. Immediately prior to starting the procedure I conducted the Time Out with the procedural staff and re-confirmed the patient s name, procedure, and site/side. (The Joint Commission universal protocol was followed.)  Yes    Sedation (Moderate or Deep): None    ABOVE ASSESSMENTS PERFORMED BY    Alejandrina Hernandez MD      INDICATIONS FOR PROCEDURES  Samara Oropeza is a 28 year old patient with pain secondary to the diagnosis of chronic migraine headaches. Her baseline symptoms have been recalcitrant to oral medications and conservative therapy.  She is here today for reinjection with Botox.    GOAL OF PROCEDURE  The goal of this procedure is to decrease pain.      TOTAL DOSE ADMINISTERED  Dose Administered:  200 units  Botox (Botulinum Toxin Type A)       2:1 Dilution   Unavoidable Drug Waste: No.  Diluent Used:  Preservative Free Normal Saline  Total Volume of Diluent Used:  4 ml  NDC #: Botox 100u ([address]-79)      CONSENT  The risks, benefits, and treatment options were discussed with Samara  YNES Oropeza and she agreed to proceed.    Written consent was obtained by  [facility] .     EQUIPMENT USED  Needle-25mm stimulating/recording  Needle-30 gauge  EMG/NCS Machine    SKIN PREPARATION  Skin preparation was performed using an alcohol wipe.    GUIDANCE DESCRIPTION  Electro-myographic guidance was necessary throughout the neck portion of the procedure to accurately identify all areas of spastic muscles while avoiding injection of non-spastic muscles, neighboring nerves and nearby vascular structures.       AREA/MUSCLE INJECTED:  200 UNITS BOTOX = TOTAL DOSE, 2:1 DILUTION     1. SHOULDER GIRDLE & NECK MUSCLES: 60 UNITS BOTOX = TOTAL DOSE     Right Splenius Cervicis - 5 units of Botox over 2 site/s.   Left Splenius Cervicis - 5 units of Botox over 2 site/s.     Right Rectus Capitis - 2.5 units of Botox at 1 site.  Left Rectus Capitis - 2.5 units of Botox at 1site.     Right Levator Scapulae - 10 units of Botox over 2 site/s.   Left Levator Scapulae - 10 units of Botox over 2 site/s.    Right Anterior Scalene - 2.5 units of Botox over 1 site/s.   Left Anterior Scalene - 2.5 units of Botox over 1 site/s.     Right Sternocleidomastoid - 2.5 units of Botox over 1 site/s.   Left Sternocleidomastoid - 2.5 units of Botox over 1 site/s.     Right Rhomboid Major - 5 units of Botox over 1 site/s.    Left Rhomboid Major - 5 units of Botox over 1 site/s.      Right Pectoralis Minor - 2.5 units of Botox over 1 site/s.    Left Pectoralis Minor - 2.5 units of Botox over 1 site/s.     2. FACIAL & SCALP MUSCLES: 140 UNITS BOTOX = TOTAL DOSE     Right Occipitalis - 5 units of Botox over 1 site/s.   Left Occipitalis - 5 units of Botox over 1 site/s.     Right Frontalis - 10 units of Botox over 2 site/s.  Left Frontalis - 10 units of Botox over 2 site/s.     Right Temporalis - 50 units of Botox over 8 site/s.  Left Temporalis - 50 units of Botox over 8 site/s.     Right  - 2.5 units of Botox over 1 site/s.              Left   - 2.5 units of Botox over 1 site/s.                 Procerus - 5 units of Botox at 1 site/s.      BILATERAL GREATER OCCIPITAL NERVE BLOCKS:  Dru% lidocaine: 9 ml  Methylprednisolone: 40 mg = 1 ml       Area just inferior to insertion of the right and left superior trapezius insertion onto skull was cleansed with ChloraPrep. Needle was advanced anteriorly to base of skull then slightly withdrawn and injectate was injected in a fan-like distribution at different depths. A 10 ml mixture of 1% lidocaine, and methylprednisolone was divided into two 5 ml syringes. Total injection volume = 5 ml per side.       RESPONSE TO PROCEDURE  Samara Oropeza tolerated the procedure well and there were no immediate complications. She was allowed to recover for an appropriate period of time and was discharged home in stable condition.    ASSESSMENT AND PLAN   Botulinum toxin injections: No changes made to Botox dose or distribution today. Occipital nerve blocks also administered per patient request. Patient will continue to monitor response to Botox and report at next appointment.   Referrals: None.   Follow up: Samara Oropeza was rescheduled for the next series of injections in 12 weeks, at which time we will evaluate response to today's injections. she may call the clinic prior with any questions or concerns prior to the next appointment.

## 2024-01-21 NOTE — PLAN OF CARE
VSS. A & O x 4. Denies SOB. Lung sounds clear. L foot has ace wrap, UTV under ace wrap as pt did not want dressing removed overnight. L foot elevated, and ice applied. Pt reported L foot pain, scheduled tylenol and prn oxycodone given. Pt reports intermittent nausea, prn IV zofran given. Pt denies any vaginal bleeding overnight. R PIV, IV abx infused. Pt requested to have IV vancomycin rate slowed at 100 mL/hr as she had discomfort from previous dose. Up independently in room w/ crutches. Voiding spontaneously without difficulty. LBM 3/7/19. Call light in reach, able to make needs known. NPO at midnight pending wound check in AM.   17:30

## 2024-02-01 ENCOUNTER — VIRTUAL VISIT (OUTPATIENT)
Dept: RHEUMATOLOGY | Facility: CLINIC | Age: 29
End: 2024-02-01
Attending: INTERNAL MEDICINE
Payer: COMMERCIAL

## 2024-02-01 VITALS — WEIGHT: 152 LBS | BODY MASS INDEX: 26.93 KG/M2 | HEIGHT: 63 IN

## 2024-02-01 DIAGNOSIS — M35.2 BEHCET'S DISEASE (H): Primary | ICD-10-CM

## 2024-02-01 DIAGNOSIS — M19.90 INFLAMMATORY ARTHRITIS: ICD-10-CM

## 2024-02-01 PROCEDURE — 99214 OFFICE O/P EST MOD 30 MIN: CPT | Mod: 95 | Performed by: INTERNAL MEDICINE

## 2024-02-01 RX ORDER — HYDROXYCHLOROQUINE SULFATE 200 MG/1
200 TABLET, FILM COATED ORAL 2 TIMES DAILY WITH MEALS
Qty: 60 TABLET | Refills: 11 | Status: SHIPPED | OUTPATIENT
Start: 2024-02-01 | End: 2024-06-05

## 2024-02-01 ASSESSMENT — PAIN SCALES - GENERAL: PAINLEVEL: MODERATE PAIN (5)

## 2024-02-01 NOTE — PROGRESS NOTES
Virtual Visit Details    Type of service:  Video Visit     Joined the call at 2024, 12:57:41 pm.  Left the call at 2024, 1:10:24 pm.    Originating Location (pt. Location): Home  Distant Location (provider location):  On-site  Platform used for Video Visit: [facility]    Office Visit Details      Rheumatology Clinic Return Visit Patient     Samara Oropeza MRN# [id number]   YOB: 1995 Age: 28 year old     Date of Visit: 2024   Last seen: 2023       Assessment & Plan    Assessment & Plan   aSmara is a 28 year old  female (ADRIAN 21) with Behçet's disease (pathergy, oral/genital ulcers, polyarthralgia) who presents today for RECHECK  .    # Behçet's disease (pathergy, arthralgias, recurrent oral / genital ulcers)  # s/p delivery on 2023 with high-risk pregnancy, complicated by pre-eclampsia,  birth but healthy baby    10/2022:  Mrs. Oropeza arrives for follow-up of her Behçet's disease that is more active at present with recent decrease of her Otezla (60mg --> 30mg) recommended by MF. Previous discussion with her OB providers had been to decrease to the lowest effective dose, clearly 30mg is not effective as she describes worsened oral ulcers, genital ulcers, arthralgias, abdominal pain, and thrombophlebitis. Although there is not a great deal of evidence for Otezla safety in pregnancy, its mechanism of action would suggest it. Counterbalancing these concerns are what we understand of Behçet's in pregnancy which more often improves but in some cases (~29%) becomes worse, and can flare more often in the 1st trimester and post- period.  She also describes some vision changes, which in the context of Behçet's is conerning. She has not seen Ophthalmology in many years; I will place a referral today.     2023: Stable today, on otezla 30 mg bid, breastfeeding, [facility] is ok with breastfeeding, discussed ACR reproductive guideline, it does not recommend otezla  during pregnancy and breastfeeding given lack of data; however Samara remained on it during pregnancy and now breastfeeding as stopping it leads to severe flare of her behcet and benefits of staying on it outweighs risks. Her MFM provider is aware of staying on otezla and is ok with it during breastfeeding.    9/27/23: Doing well, remains on otezla. Has had some interruptions in doses. Today has back and hand pain, and ankle swelling. Will start celebrex, ok with breastfeeding.     Today 2/1/2024:    Behcet's oral/vaginal ulcers are under fair control on otezla, will continue. But she continues to have active inflammatory arthritis, she failed celebrex. Will add  mg bid; risks were discussed. HCQ is safe in pregnancy and breastfeeding in case of pregnancy in future . Will check labs.    Plan:    Add plaquenil 1 tab twice a day with food    Labs    Return video in about 3-4 months      Dominga Sosa MD    Orders Placed This Encounter   Procedures    Vitamin D Deficiency    Ferritin    Iron and iron binding capacity    ALT    Albumin level    AST    Creatinine    Complement C4    Complement C3    CRP inflammation    DNA double stranded antibodies    Erythrocyte sedimentation rate auto    UA with Microscopic reflex to Culture    Protein  random urine    Creatinine random urine    Cyclic Citrullinated Peptide Antibody IgG    Rheumatoid factor    ANCA IgG by IFA with Reflex to Titer    Myeloperoxidase and Proteinase 3 Panel    CBC with Platelets & Differential                Medical History   History of Present Illness   Samara Oropeza is a 27 year old female who presents for follow-up of her Behçet's in the context of pregnancy.      [place]ily[place] [facility] [place]    Original presentation 2014:     Ms Oropeza is a 19 y.o. female who presents with one year of presentation of painful oral ulcers (monthly) once that have worsened with two episodes in the last two months that presented with  painful vulvar or vaginal ulcers (2 episodes so far every month) [not sure if they leave scar] as well that timing coincides with menses ([facility] [place]: 8/25/14).   ORAL ULCERS: last two episodes were severe, painful, white base with erythematous halo, that appear and disappear in the matter of 1-2 weeks without, does not bleed and doesn't respond to any treatment used (magic mouthwash), 10-15 in number with the biggest one being dime size.      VAGINAL ULCERS: 2-3 in number between labia majora and minora that occur simultaneously with oral ulcers, painful, non bleeding ulcers that are erythematous, no pus.      FOLLICULITIS: patient reports having spots in legs specially around ankle and knee, but arms, and neck are affected as well. Small lesions that resemble ingrown hair, most are red erythematous elevated lesions, well demarcated, some with liquid that patient refers as pimple like.      SKIN RASHES: welts and red macules with well defined borders that are pruritic and non-ulcerative on chest, arms and back. Benadryl relieves.      ARTHRALGIAS: In descending order of severity: hips, knees, wrists, shoulders, neck, lumbar, fingers.   C/o morning stiffness in hips, back and neck lasting for about until noon.      Ms. Oropeza reports they present in severe flares and never fully resolve, but do get better. She has seen some swelling and warmth on the affected joints but has not noticed and redness. Has tried Tylenol, ibuprofen, and hydrocodone with not much benefit.      FEVERS: Reports 3-4 sporadic episodes per month of self limited fevers of 38 C that occur during the evenings and associate facial flushing.      HEADACHES: occur daily and are bitemporal and irradiate occipitally, pulsatile in nature, intensity varies from intense to mild, are worsened with movement. On treatment with ibuprofen and somatriptan without any positive results.      VISION CHANGES: Patient reports that suddenly she would only see a gray blotch  in her right eyes and would persist like this for a day and a half. Also reports blurriness in her left eye. Presence of pain and burning sensation of eyeball with associated redness. Has changed prescription glasses in the matter of 2 years 3 times. She has had opthalmology exam and was not suggestive of any evidence of uveitis.   She was also evaluated in ED for a dizzy spell.      ABD PAIN W/ INTERMITTENT DIARRHEA AND CONSTIPATION: Patient reports abdominal pain that is intermittent, periods of 7 days without bowel movement and feeling bloated with change of pattern to diarrhea (liquidy without blood nor mucus, non foul smelling). Has taken Bentyl, Zegrid, and rinetidine with mild clinical improvement.  She also reports small red nodules after each needle stick for blood draw.   Neurology saw her back then and was not impressed with her presentation as physical examination was normal. Also MRI brain normal: Findings: No definite hemorrhage, mass affect, midline shift, or ventriculomegaly is noted.There are a few scattered non specific white matter T2 hyperintensities. Axial diffusion weighted images are unremarkable.     The major vascular structures appear patent. There is opacification and severe mucosal thickening in the right maxillary sinus. The remaining paranasal sinuses, and mastoid air cells are clear. Orbits are unremarkable  She has + HSV-1 IGG. Seen ID. Negative for Herpes simplex virus culture. HSV IGM I/II COMBINATION SENT TO [facility]  RESULTS = <0.91  REF RANGE: <0.91 = NEGATIVE  ID did not think HSV could explain her mouth and genital sores.      CRP within normal limits. HIV negative. CBC within normal limits; UA negative. Creatinine within normal limits   CYNDIE neg.  Antigliadin neg.   ANti TTG neg.   [facility] 2014: Colonoscopy and endoscopy neg; result not available. Not sure if biopsies were done.   Raynaud's phenomenon:  Onset: about 2013  Digits: all fingers  Digital ulcers: no  Color changes:  white ---> purple and red  Duration of an attack: About couple of hours per mom  Pain during attack: not clear pain but bruise like feeling  Frequency of attacks: few times a month  Family history of Raynauds: no  GERD: very long time     No hemoptysis.   No miscarriages/ no thrombosis in past.   No family or personal history of psoriasis, ulcerative colitis or chron's disease.   No buttock pain or low back pain or stiffness in AM     Interim history:  Dec 2014- Multiple mouth ulcers and did not eat for 5 days. This coincided with periods. Colchicine 0.6mg PO BID started in Nov 2014.   Prednisone taper in Dec 20mg to 0 in 4 weeks. She also used magic mouthwash. Kenalog cream. After going to the ER, 3 days later her ulcers were better. She thinks it improved after the menstruation stopped.   LMP 3 weeks ago.   COLCHICINE HAS HELPED A LOT REDUCING THE INTENSITY OF MENSTRUATION ASSOCIATED ORAL AND VULVAR ULCERS.      Interim history: 6/15     Since last visit only two episodes of mouth sores- small sized 6   No genital ulcers since last visit.   Colchicine 1.2 mg in AM and 0.6mg in PM keeps her symptoms under control.      Admitted in 5/15:  Admission Diagnoses:  - LUE weakness  - spell  - hx of migraines  - hx of Tourette syndrome  - hx of Behcet's disease     Discharge Diagnoses:   - spell likely 2/2 anxiety  - hx of migraines  - hx of Tourette syndrome  - hx of Behcet's disease     CT and MRI brain was normal.      Seeing Dr. Lilliana Miramontes soon as outpatient.      Dr. Garcia did not find any intraocular inflammation.       Interm 10/30/2015  ED for migraine. Continues to see neuro - MRI brain without lesions noted. Saw GI - upper barium normal. May be considering repeat colo. Worsening lesions in mouth and genitals. Has had continuous lesion in mouth x 2 months. 2 genital ulcers. Had fracture of right sesamoid in foot. Continues to have low grade fevers. New folliculitis on chest, legs and arms.      Interim  "hx: 1/11/2016    Pt states that since last visit she continues to have oral ulcers and has noted more folliculitis-like lesions on her lower extremities.  She states that on her right lower leg she had a red macular spot that has since resolved.  She denies uveitis.  She states that the colchicine initially helped but then stopped working.  She takes 0.6 mg TID.  She stopped taking her prednisone last week as she feels like this hasn't helped.  She hasn't had any episodes of vaginal ulcers.      She saw GI who stated she has gastroparesis.  She is seeing Cardiology later this week for possible syncopal episodes.       Interim history 5/16  Mouth ulcers X 1 last month  Genital ulcers - none since last visit.      Bilateral MCPs pain, knee pain and low back pain +ve increased since last visit  Morning stiffness X 2 hours (increasing)   5-6/10     \"overall myalgia\" has gotten worse    C/o pseudofolliculitis lesion on anterior shins bilaterally worse     Interim history: (7/18/2016)  Patient has just started Humira for 2 doses on 7/1 and 7/15 and reported minimal improvement of her hip pain but otherwise, did not notice anything different.      She reported painful mouth ulcer once a month since last visit but noticed that it has been getting deeper.   Denied any genital ulcers.     Still has ongoing bilateral MCPs pain, knee pain but this has been intermittent and she did not have any much pain today. She reported 2-3 hours morning stiffness of both hands and pain score of 6/10 at her knees and 8/10 of her hands but currently takes no pain medication except prn ativan which she takes when her muscle is really tight.      The only concern is that she gained weight even though she has not been eating much (eat once a day) and do exercises every day for 1 hour (with video of yoga, pilates). Her mother wonders if she needs to have some labs work up include sex hormone, thyroid, cortisol.     Interval history " 9/14/16  Patient was started Humira at the last visit, patient reports worsening of skin ulcers since starting Humira and stopped taking Humura for the past 2 weeks. Patient reports oral and genital ulcers has been stable even before starting Humira and has not had any interval oral/genital ulcers. However she reports recurrent skin ulcers occurring on a daily basis that affects her chest and anterior legs. Patient also has stopped taking prednisone, she reports that she doesn't feel the prednisone helps, her last dose of prednisone was 6+ months ago. Patient also report worsening low back pain that sometimes causes her to limp.     In the interval patient also visited ED on 8/31/16 for left hand pain and what the patient describes as phlebitis, no medication or procedure was done and the patient was discharged with a splint. Patient also reported 1 episode of chest pressure that was associated with dyspnea and numbness of the left arm, she was shopping in a [employer] at the time of onset, and the pressure resolved after she took a nap.     Patient denies any infectious symptoms in the interval, no fever, chills, night sweat, cough, dysuria, denies eye pain or visual changes.     November 4, 2016  Oct - had one genital ulcer  Oral ulcers X 5- around menstruation time.   Knee pain- chronic on the left side  Dizziness spells - on and off; been to the ER for that in the past.   On and off migraines  July 2013 - s/p MPFL reconstruction plus LRL 7/2013  Morning stiffness in knee (left) X 1 hour     Severe jaw pain and chest pain- patient wondering if this is Tourette's or esophageal spasms. Cardiac workup negative.   Fever      January 13, 2017  Yesterday in ER [facility] with orthostatic syncope from dehydration.   Chest pain on and off still continues. Painful with deep breaths.   Oral ulcers - few minor ones lasting for one week;   One major one in roof of mouth lasting for about one week.   Knee pain +ve - reviewed the  recent MRI with her orthopedic surgeon.   On and off migraines  otezla is approved. Still waiting to receive the meds.      March 31, 2017  Otezla current dose 15mg PO BID.   She is tolerating okay at this dose and is willing to increase the dose further up to 30mg BID.   Her joint pains are better on otezla. Knee pain is also better.   Back and neck pain +ve 8/10 when it is worse. Intramuscular botox injections were given on Monday in PMR.   2 minor genital ulcers in the interim- resolved.   Few oral ulcers in the interim- resolved.   Nasal ulcer - resolved.   Migraines on and off.   Nausea with otezla but improving.   Dizziness and blackouts on and off. She fell once and hurt her ankle. Wearing boot in left leg.   Complete ROS negative except for above     May 17, 2017  Tolerating otezla better. Not throwing up anymore. Current dose 20mg in AM and 30mg in PM (2nd week).   Patient thinks that her oral ulcers frequency and severity are improving with otezla. Also no genital ulcers in the interim.   Currently on doxycycline for bronchitis/?pneunomia. 4 more days left.      Joint pain history  2 weeks ago/ dx with pneumonia 1 week ago/  Involved joints: all over  Pain scale: 6.5/10   Wakes the patient from sleep : Yes  Morning stiffness: Yes for 120 minutes  Meds used:otezla,colchicine     Interim history  Since last visit:  1. Infections - Yes/ pneumonia  2. New symptoms/medical problem - Yes/ thinks she may have had a mini-seizure about 10 days ago ; did not seek medical attention; did not reoccur after that. Patient seeing PCP today.  3. Any side effects from Rheum medications -vomiting a lot (resolved)  3. ER visits/Hospitalizations/surgeries - Yes  4. Last PCP visit: has an apt. today     Patient still getting double vision. Floaters present.      Therapist recommended psychiatry evaluation. Patient does not want to see psychaitry. Patient will see PCP today.      August 22, 2017  Have you ever seen a  rheumatologist Yes Who You When 5/17/17     NO genital ulcers in the interim.   Mouth ulcers- three in the back of the throat and roof of the mouth last month.      Joint pain history  Onset: doing alittle worse / cut her otezla back to 30mg  Daily due to GI problems  Involved joints: all over her body. Been having more black out episodes, been having more chest pains.also has raised bumps on both legs from the knees down that itch and are painful   Pain scale:  5.5/10     Wakes the patient from sleep : Yes  Morning stiffness:Yes for 120 minutes  Meds used:otezla, colchicine (three pills daily)     Interim history  Since last visit:  1. Infections - Yes stomach issue  2. New symptoms/medical problem - No  3. Any side effects from Rheum medications -nausea from otezla  3. ER visits/Hospitalizations/surgeries - Yes, ER for foot, ankle injury from passing out and fell down the steps. Hit her head another time from passing out. Alcohol poisoning in July 4. Last PCP visit: 6/23/17 September 19, 2017  Have you ever seen a rheumatologist Yes Who You When 8/22/17  Joint pain history  Onset: pt states that she hurts everywhere for 6 days  Involved joints: all joints  Pain scale:  7/10     Wakes the patient from sleep : Yes/ not sleeping at all  Morning stiffness:Yes for 180 minutes  Meds used:colchicine, otezla, magic mouth wash, lidocaine gel  Last one week: increased joint pain; and genital ulcer  Genital lesion (dimed size) in the last one week  After botox a week ago, she had fever 101 for three days; near syncopes. Back pain; floaters  Chest pain , mouth sores, hand pain, morning pain were all better with otezla 30mg twice daily.     Interim history  Since last visit:  1. Infections - No  2. New symptoms/medical problem - No  3. Any side effects from Rheum medications -nausea from the otezla  3. ER visits/Hospitalizations/surgeries - No  4. Last PCP visit: may 2017      October 18, 2017     Have you ever seen a  rheumatologist Yes Who You When 9/19/17  Joint pain history  NO mouth or genital sores in the last 4 weeks.   Medrol dosepak helped with visual symptoms but not joint pains.      Onset: pt states that she is doing better than last time with her behcet's. Today has severe stomach pains, states that she is constipated. Pt had a seizure 2 days ago. Does have a metallic taste in her mouth  Involved joints: hands , neck, shoulders  Pain scale:  9/10 from stomach pain;      Wakes the patient from sleep : Yes  Morning stiffness:Yes for 120 minutes  Meds used:cochicine , otezla 30mg twice daily     Interim history  Since last visit:  1. Infections - No  2. New symptoms/medical problem - Yes/ the cartilage in her chest is inflamed  3. Any side effects from Rheum medications -nausea from the otezla  3. ER visits/Hospitalizations/surgeries - No  4. Last PCP visit: yesterday     January 16, 2018  Have you ever seen a rheumatologist Yes Who You When 10/18/17  Joint pain history  Onset: pt states that she was in the hospital for 5 days before christmas, they told her she had lesions in her brain in the white matter. Had blood work drawn in the ER and they told her her inflammatory markers were elevated. Was seen in the ER last week for vomiting and diarrhea, not eating. Saw Gastro. On 1/10/18. 1.5 weeks ago she had an endoscopy and colonoscopy. She has been having elbow  And hands and knee pain, loosing use of her hands. Been dropping things. Also states that she has been getting lesions up her nose  Involved joints: see above  Pain scale:  8/10     Wakes the patient from sleep : Yes  Morning stiffness:Yes for 120 minutes  Meds used:colchisine, otezla, magic mouth wash, kenolog cream, lidocaine gel     Interim history  Since last visit:  1. Infections - Yes/ had an infection in her jaw. Having sinus issues  2. New symptoms/medical problem - Yes/ states that she is having problems walking, states that she was bouncing when she was  walking  3. Any side effects from Rheum medications -otezla, nausea  3. ER visits/Hospitalizations/surgeries - Yes/ see chart  4. Last PCP visit: November 2017 April 17, 2018  Have you ever seen a rheumatologist Yes Who You When 1/16/18  Joint pain history  Onset: pt states that she is not doing well. She is having increased stomach issues, only eats 2 times in 4 days unable to keep the otezla down due to vomiting . Has sleep study done in 1 week; no genital ulcers since last appointment. 6 small mouth sores since last appointment.   Involved joints: see above   Pain scale:  7/10     Wakes the patient from sleep : Yes  Morning stiffness:Yes for 60 minutes  Meds used:otezla , colchicine, diclofenac gel, magic mouthwash, lidocaine gel      Interim history  Since last visit:  1. Infections - Yes/ had a sinus infection, thinks she is getting sick now  2. New symptoms/medical problem - Yes/ neurology sent pt to cardiology. Has a heart condition but not sure what . She is seeing a ortho. Due to knee pain   3. Any side effects from Rheum medications -nausea/vomiting from the otezla   3. ER visits/Hospitalizations/surgeries - Yes/ seen in ER   4. Last PCP visit: yesterday     July 17, 2018  Have you ever seen a rheumatologist Yes Who You When 4/17/18  Joint pain history  Onset: pt Is here for a follow-up states that she started to get lesions on her legs , face and arm. Hurts all over, lower back is very painful. Right hand and wrist area are numb  Involved joints: see above  Pain scale:  7/10   worse in the morning  Wakes the patient from sleep : Yes/ does not go to sleep till late  Morning stiffness:Yes for 4-5 hours minutes  Meds used:colchicine, otezla has  Not started otezla yet due to extreme nausea      Interim history  Since last visit:  1. Infections - Yes/ had a bacterial infection in her pelvic area was hospitalized  2. New symptoms/medical problem - Yes/ hands are swelling up. Having orthopedic issues. Was  having problem with her veins sticking up, and very painful. Has happened multiply times   3. Any side effects from Rheum medications -see above  3. ER visits/Hospitalizations/surgeries - Yes/ hospital and ER for bacterial infection  4. 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 ER said may have blood clot in calf, but when did MRI, stated body may have broken it up on it's own. Elevated D-dimer  Involved joints: Knees, neck, hands  Pain scale:  6.5/10     Wakes the patient from sleep : Yes  Morning stiffness:Yes for 180 minutes  Meds used:hyoscyamine, not taking otezla. Last dose Sep. Patient did not want to take otezla with the antibiotics.      Last major mouth ulcer- aug or Sep  No genital ulcers in the last 6 months.      Interim history  Since last visit:  1. Infections - Yes, UTI's twice a month since last visit  2. New symptoms/medical problem - No  3. Any side effects from Rheum medications -otzela causes nausea  3. ER visits/Hospitalizations/surgeries - Yes, 1/2/19 repaired some ligaments and mass taken out, also joint build up removed from a previous injury  4. Last PCP visit: 12/5/19 June 12, 2019  Have you ever seen a rheumatologist yes Who you When 1/9/19  Joint pain history  Onset: Patient is here for a follow up. A lot of infections and in and out of the hospital, who wanted her to follow up with Rheumatology again.  Involved joints: knees, legs, back, neck, shoulders, ankles  Pain scale:  6.5/10     Wakes the patient from sleep : Yes  Morning stiffness:Yes for 120 minutes  Meds used:magic mouthwash, colchicine     Interim history  Since last visit:  1. Infections - Yes, staph  2. New symptoms/medical problem - kidney failure  3. Any side effects from Rheum medications -none  3. ER visits/Hospitalizations/surgeries - Yes, 3  hospitalizations, and surgeries,  4. Last PCP visit: 6/11/19        Today 9/30/2020: New to me, establishing care. Flaring off otezla.     816   Reports worsening oral ulcers and joint pain and skin rash.     No vaginal ulcers currently. Last ones were 5 months ago.     Gets oral ulcers monthly, not today, had them last few days ago. They come up as flare up around period time. They self-resolve.     Thinks colcrys is helping but not enough, lost some benefit. No longer has diarrhea from it. the dose is 0.6 mg bid.     Came off otezla last year when she had severe staff infection, there was drug drug interaction with vancomycin. Wants to go back on it.     Skin lesions over chest are clearing up. Has skin lesions over her legs.     She is off of otezla >1 yr. She had daily vomiting and abdominal cramps initially which later resolved after 2 months.      Today, is her last day liz her health insurnaace  .     Reports pain and swelling liz hands. Drops things, lost strenghth. Veins look inflamed. Hands are swollen in AM and before bedtime. AM stiffness is 2 hours. can t tolerate ibuprofen.     Prednnisonne does not help much.     Wants to try eleve.     Had 2 blood clots over L arm, finished xraalto 4 months ago.      Was told to have severe dry eyes, hs not had eyes checked> 1 yr as was in the hospital with staff infection. systanee nd gentle eyedrops help some, sometimes gets sharp pain and and blurry vision in her eyes from dryness. No h/o uveitis.     has dry mouth, drinks water.     Gets T  F sometimes. It is sporadic.     Lost hair.     Gets intermittent CP. Had several hospital visits and admissions in the [facility] for it. lorazepam helped witch chest spasms, she does not like pain meds.     She was prescribed 0.5 mgq d prn, 10 tbs/monthss.     She gets dry cough, just started using albuterol inhaler, it helps.     No SOB.     Has gastroparesis, IBS  nd h/o severas admission for constiption, colon blockage  with impaction and gets bloating. has lost follow up with GI.     She is working with  to get medical assistance to get insurance back by early next year. She filed it again.     Gets botox inj every 3 months nd they help, has to cancel 10/20 botox inj s will not have insurance. they came back yesterday.     last seizure waas last year. still gets black out spells, salts ne was last week.    derm eye gi     FHx: Both parents have RA.  SHx: She was working in a [employer] shop, it was closed because of pandemic. sexually active, not on oral contraceptives. She thinks she had a misccraaaiaage last wk, had n period x2 months then mueller bleeding a lot, dark red and was different from period. Felt something came out that she feels she miscarried, no pos pregnancy test. No smoking. rarely drinks ETOH.  [facility]   thumbs between MCP tender   otezla helped with skin lesions, oral ulcers, joint pain.  colcrys  sjogre aleve biotene voltaren [id number]        8/11/2022: Samara presents for urgent visit to discuss m/o Behcet's flare during pregnancy, she is   She is pregnant 10 wk 3 days with ADRIAN 3/6/2023.  In 2020, had 1st trimester miscarriage.  Has LBP, ankle pain/swelling.   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 Discussed with MFM, back on low dose otezla since last week, only 1 tab a day.    Interval History  10/28/2022  Mrs. Oropeza was last seen 08/11/22. At that time she was advised to continue otezla (1 tablet BID) and remain off colcrys. Since that time, she has instead taken otezla 1 tablet daily. She notes more joint pain in hands, wrists, cervical spine, knees with 4 hours of monring stiffness. Previously <1hr with full strength.     Had a few genital ulcers recently which only lasted a few days. Oral ulcers have been more active of late and are currently healing.     Abdominal pain on L side, pain is always worse after  eating. Out of the phase of her pregnancy with morning sickness, at 22w on Monday.     She describes a few fevers at the end of September with hot flashes and cold sweats with Tmax 100. Called OB-GYN who instructed she should go in if persistent, though it resolved within a few days.     More easy bruising - thighs, she is currently on lovenox and baby aspirin. Previous history of clots especially with interventions. She recently had an issue with superficial thrombophlebitis following a blood draw.     Seeing more floaters in her vision. Sometimes tiny and clear, but can also be larger and 'gray', up to 1/4 of her vision. Has not seen Ophthalmology in many years.     More frequent migraines - did a nerve block recently but this has worn off, previous botox injections.     ADRIAN 03/06/23.    1/25/23:    Baby is growing small. Had high BP yesterday, they would monitor it. DBP was 90.    Increasing otezla to bid helped. It helped with ulcers. Has skin breakdown since Christmas, never had it like this over her chest, but still it feels related to Behcet.    Hands and ankles are painful and swoleln, ankles are new but had hand pain with behcet before,. This started fater entering [address] [place] [address]. Her ADRIAN might be earlier based on baby's growth, induction at 37 wk, progress at 39. nex twk will have another check.    Still has the neck pain. Still gets eye floaters.    6/21/2023:    She delivered 1 month early due to pre-eclampsia. Had Mg infusion, had bleeding issues, spent a week. Was on BP med till a month ago, now stable. Was induced, had NVD. No blood transfusions needed.    Her baby girl Lashanda was born 2/12/2023, small, slow growth but healthy with no fetal deformities. Had vaginal sores after giving birth. Still gets mouth ulcers, one oral ulcer now, no vaginal ulcers now. Plans to breast feed x 7 months. Does breastfeeding. Saw OB/[facility]N in 4/2023 [facility]. They are aware she is on otezla.    She was on ASA 81 mg  every day during pregnancy.    Today 9/27/23: Doing well, less behcet flares since giving birth. Most recent flare on her chest, now healing. No new oral or vaginal ulcers.     She has been having back pain that shoots down her right leg since June and has been utilizing PT without improvement.     She also has had swelling and pain in both hands and right wrist, as well as both ankles L>R.     She notes missing some doses of otezla due to running out of refills and avoiding taking it without food. She has been eating less because she has been more tired since having her daughter.      Today 2/1/2024:    Has back pain. Had epidural inj one wk ago, has to wait x 2 weeks, if no help to get surgery for bulging disc    Some oral lesions 2wk ago flare with swollen hands, but currently has no oral ulcers.     Hands hurt today with pain level about 4/10. Celebrex did not help much.    No vaginal ulcers    Reports loss of appetite past 5 months    Her baby girl was sick x past 2 weeks, just got better last night, she had RSV. Samara did not get much sleep, did not eat much over past 2 weeks because of this.    Review of Systems    A comprehensive ROS was done. Positives are per HPI.    Past Medical History   Past Medical History:   Diagnosis Date    Anemia     Anxiety     Arthritis     Behcet's disease (H)     Cervical adenitis May 2010    Chronic abdominal pain     Constipation, chronic 1994    Fibromyalgia     Gastro-oesophageal reflux disease     Gastroparesis     Irregular heart beat     tachycardia, has had workup    Migraines     Neuromuscular disorder (H)     fibramyalgia    Palpitations     PONV (postoperative nausea and vomiting)     Seizure (H)     Seizures (H)     unknown etiology    Syncope     Tourette's       Past Surgical History:   Procedure Laterality Date    ARTHROSCOPY ANKLE, OPEN REPAIR LIGAMENT, COMBINED Left 9/25/2019    Procedure: LEFT ANKLE ARTHROSCOPY WITH LIGAMENT REPAIR;  Surgeon: Elizabeth  ROSIBEL Campos;  Location: [facility]    ARTHROSCOPY ANKLE, REPAIR LIGAMENT Left 1/2/2019    Procedure: Ankle arthroscopy and sinus tarsi evacuation, ligament repair, left lower extremity;  Surgeon: Andres Johnson DPM;  Location: [facility] OR    ARTHROSCOPY KNEE WITH PATELLAR REALIGNMENT  7/25/2013    Procedure: ARTHROSCOPY KNEE WITH PATELLAR REALIGNMENT;  Left Knee Arthroscopy, Medial Patellofemoral Ligament Reconstruction with Allograft  ;  Surgeon: Jennifer Acevedo MD;  Location: [facility]    COLONOSCOPY  2015    DENTAL SURGERY  1996    Teeth removal    ENDOSCOPY UPPER, COLONOSCOPY, COMBINED  2005    HC ESOPH/GAS REFLUX TEST W NASAL IMPED >1 HR N/A 2/15/2017    Procedure: ESOPHAGEAL IMPEDENCE FUNCTION TEST WITH 24 HOUR PH GREATER THAN 1 HOUR;  Surgeon: Timothy Matta MD;  Location: [facility]    IR PICC PLACEMENT > 5 YRS OF AGE  3/13/2019    IR UPPER EXTREMITY VENOGRAM LEFT  2/25/2020    IRRIGATION AND DEBRIDEMENT FOOT, COMBINED Left 3/12/2019    Procedure: COMBINED IRRIGATION AND DEBRIDEMENT LEFT ANKLE;  Surgeon: Micha Glover MD;  Location: [facility]    IRRIGATION AND DEBRIDEMENT LOWER EXTREMITY, COMBINED Left 5/7/2019    Procedure: 1.  Excision of wound down to and including deep fascia, less than 20 cm2.  2.  Irrigation and debridement, left ankle.;  Surgeon: Andres Johnson DPM;  Location: [facility] OR    PICC INSERTION Left 05/05/2019    4Fr - 45cm (5cm external), Basilic vein, SVC RA junction      Patient Active Problem List    Diagnosis Date Noted    Preeclampsia 02/10/2023     Priority: Medium    Infection of joint of ankle (H) 05/06/2019     Priority: Medium    Cellulitis 03/09/2019     Priority: Medium    Displacement of lumbar intervertebral disc without myelopathy 11/13/2018     Priority: Medium     Overview:   Created by Conversion      Iron deficiency associated with nonfamilial restless legs syndrome 11/13/2018     Priority: Medium    Enthesopathy of hip region 11/13/2018     Priority: Medium      Overview:   Created by Conversion      Tourette's syndrome 11/13/2018     Priority: Medium     Overview:   Created by Conversion      Somatic symptom disorder 06/01/2018     Priority: Medium    Pelvic floor weakness 04/25/2018     Priority: Medium    Spells of decreased attentiveness 12/19/2017     Priority: Medium    Mobile cecum (H28) 11/09/2017     Priority: Medium     Cecum noted in Right lower quadrant on 4/17 CT scan, and in Left upper Quadrant on CT on 11/2017.      Vitamin D deficiency 10/11/2017     Priority: Medium     How low, unknown/not found. On D when tested at 27. Starting cholecalciferol October 2017. Needs recheck.      Convulsions, unspecified convulsion type (H) 10/03/2017     Priority: Medium    Transient alteration of awareness 10/03/2017     Priority: Medium    Chronic pain syndrome 07/27/2017     Priority: Medium    Major depressive disorder, recurrent episode, moderate (H) 06/27/2017     Priority: Medium    Cervical pain 05/02/2017     Priority: Medium    Acute left ankle pain 03/31/2017     Priority: Medium    Cervical dystonia 03/28/2017     Priority: Medium    PTSD (post-traumatic stress disorder) 01/17/2017     Priority: Medium    Patellofemoral instability 10/20/2016     Priority: Medium    Fibromyalgia 08/04/2016     Priority: Medium    Rheumatoid arthritis of multiple sites without rheumatoid factor (H) 08/04/2016     Priority: Medium    Raynaud's disease without gangrene 08/04/2016     Priority: Medium    Chronic abdominal pain 08/04/2016     Priority: Medium    Palpitations 01/12/2016     Priority: Medium    On colchicine therapy 10/30/2015     Priority: Medium    Spell of shaking 05/06/2015     Priority: Medium    Migraine 02/04/2015     Priority: Medium    Behcet's disease (H) 12/10/2014     Priority: Medium    Headaches due to old head injury 11/12/2013     Priority: Medium    Milk protein intolerance 10/11/2013     Priority: Medium    Intestinal malabsorption 10/11/2013      Priority: Medium    Concussion 02/13/2013     Priority: Medium     Jan 2013, with prolonged recovery- followed by sports med        Knee pain 01/03/2013     Priority: Medium    Generalized anxiety disorder 06/25/2009     Priority: Medium    Tics - Tourette syndrome 05/18/2009     Priority: Medium     Followed by psychotherapy. Symptoms well managed. Originally diagnosed at [facility]. (Dr. Simpson)          IBS (irritable bowel syndrome) 05/18/2009     Priority: Medium    Allergic rhinitis 05/18/2009     Priority: Medium    GERD (gastroesophageal reflux disease) 01/10/2008     Priority: Medium    Gastroparesis 1994     Priority: Medium      Family History   Problem Relation Age of Onset    Depression Mother     Neurologic Disorder Mother         Migraines, take imitrex injection.  Also in maternal grandmother.      Alcohol/Drug Father     Hypertension Father     Depression Father     Osteoarthritis Father     Cardiovascular Maternal Grandmother     Depression Maternal Grandmother     Hypertension Maternal Grandmother     Alzheimer Disease Maternal Grandmother     Cardiovascular Maternal Grandfather     Hypertension Maternal Grandfather     Depression Maternal Grandfather     Alcohol/Drug Maternal Grandfather     Diabetes Maternal Grandfather     Cardiovascular Paternal Grandmother     Hypertension Paternal Grandmother     Cardiovascular Paternal Grandfather     Hypertension Paternal Grandfather     Glaucoma No family hx of     Macular Degeneration No family hx of       Allergies   Allergen Reactions    Amoxil [Penicillins] Rash     Dad unsure of reaction.    Bee Venom Anaphylaxis    Bioflavonoids Anaphylaxis    Citrus Anaphylaxis     All [facility]    Contrast Dye Rash     Contrast Media Ready-Box [facility] [place].; Contrast Media Ready-Box [facility] [place].  NOTE: this is a contrast media oral with iodine. Premedicate with methylpred standard for IV contrast, request barium contrast for oral contrast.     Iodinated Contrast Media Hives and Rash     Contrast Media Ready-[place] [facility] [place].; Contrast [place] Ready-[place] [facility] [place].  NOTE: this is a contrast media oral with iodine. Premedicate with methylpred standard for IV contrast, request barium contrast for oral contrast.    Pineapple Anaphylaxis, Difficulty breathing and Rash    Reglan [Metoclopramide] Other (See Comments)     IV dose only, in ER, rapid heart rate.    Ace Inhibitors      Difficulty in breathing and GI upset    Amitiza [Lubiprostone] Nausea and Vomiting    Amoxicillin-Pot Clavulanate     Midazolam Unknown     parent states that when pt takes this medication, she wakes up being very violent .    Midazolam Hcl      Coming out of pelvic exam at age of 5, was kicking and screaming when coming out of the versed.    Other [No Clinical Screening - See Comments]      Bleech/ chest tightness, itchy throat, swollen tongue, hives    Tizanidine Other (See Comments)     Confusion, back pain, photophobia, abdominal pain, shaking, anxious      Adhesive Tape Rash    Azithromycin Hives and Rash    Cephalexin Itching and Rash    Sulfa Antibiotics Rash     Skin scarring      Current Outpatient Medications   Medication Sig Dispense Refill    albuterol (PROAIR HFA/PROVENTIL HFA/VENTOLIN HFA) 108 (90 Base) MCG/ACT inhaler Inhale 2 puffs into the lungs every 6 hours as needed for shortness of breath / dyspnea or wheezing 1 Inhaler 1    amitriptyline (ELAVIL) 25 MG tablet TAKE 1 TABLET BY MOUTH EVERY NIGHT AT BEDTIME 90 tablet 1    apremilast (OTEZLA) 30 MG tablet Take 1 tablet (30 mg) by mouth 2 times daily Hold for signs of infection, and seek medical attention. 60 tablet 5    artificial tears OINT ophthalmic ointment 0.5 inch strip each eye at night 1 Tube 11    azelaic acid (FINACIA) 15 % external gel Once daily to scars, upper chest and small portion of back and spot on belly button. Hold if irritating 50 g 5    benzocaine (AMERICAINE) 20 % external aerosol Apply  to perineum four times daily as needed for pain 57 g 3    benzocaine (TOPICALE XTRA) 20 % GEL Apply as needed locally to mouth or nasal ulcers for pain; 4 times daily as needed 30 g 1    betamethasone valerate (VALISONE) 0.1 % cream Apply topically 2 times daily as needed       bisacodyl (DULCOLAX) 5 MG EC tablet Take 2 tablets (10 mg) by mouth daily as needed for constipation 30 tablet 0    celecoxib (CELEBREX) 100 MG capsule Take 1 capsule (100 mg) by mouth 2 times daily as needed for pain 60 capsule 1    citalopram (CELEXA) 20 MG tablet Take 1 tablet (20 mg) by mouth daily 90 tablet 1    EPINEPHrine (EPIPEN 2-EAMON) 0.3 MG/0.3ML injection Inject 0.3 mLs (0.3 mg) into the muscle as needed for anaphylaxis 0.6 mL 3    etonogestrel (NEXPLANON) 68 MG IMPL Inject 68 mg Subcutaneous      fluocinonide (LIDEX) 0.05 % external gel Apply topically 2 times daily 60 g 11    gabapentin (NEURONTIN) 300 MG capsule Take 1 capsule (300 mg) by mouth every morning 60 capsule 1    hydrocortisone, Perianal, (ANUSOL-HC) 2.5 % cream Place rectally 3 times daily as needed for hemorrhoids 30 g 0    hydrOXYzine HCl (ATARAX) 25 MG tablet TAKE ONE OR TWO TABLETS BY MOUTH EVERY SIX HOURS AS NEEDED      hypromellose (GENTEAL) 0.3 % SOLN 1 drop every hour as needed for dry eyes       lactulose 20 GM/30ML SOLN Take 30 mLs by mouth 3 times daily as needed (for constipation) 300 mL 3    lanolin ointment Apply topically every hour as needed for other (sore nipples) 7 g 3    lidocaine (LMX4) 4 % external cream Apply topically once as needed for mild pain 120 g 1    LINZESS 290 MCG capsule TAKE 1 CAPSULE BY MOUTH EVERY DAY IN THE MORNING BEFORE BREAKFAST 90 capsule 0    mometasone (ELOCON) 0.1 % external ointment Apply topically 2 times daily 45 g 11    ondansetron (ZOFRAN ODT) 8 MG ODT tab Take 1 tablet by mouth every 8 hours as needed      polyethylene glycol (MIRALAX/GLYCOLAX) powder Take 1 capful by mouth 3 times daily      Prenatal MV-Min-Fe  "Fum-FA-DHA (PRENATAL 1 PO)       rizatriptan (MAXALT-MLT) 5 MG ODT DISSOLVE 1 OR 2 TABLETS IN THE MOUTH AS NEEDED FOR HEADACHE AT ONSET OF MIGRAINE, MAY REPEAT IN 2 HOURS AS NEEDED. MAX 30MG IN 24 HOURS AND MAX 5 DAYS PER MONTH      sodium fluoride 1.1 % CREA Apply 1 Application topically daily      triamcinolone (KENALOG) 0.1 % external ointment Apply twice daily as needed to lesions on the genitals and body. OK to use very sparingly on the face. 454 g 2    sucralfate (CARAFATE) 1 GM/10ML suspension Take 10 mLs (1 g) by mouth 4 times daily (Patient not taking: Reported on 2/1/2024) 1200 mL 2           Physical Exam   Ht 1.6 m (5' 3\")   Wt 68.9 kg (152 lb)   BMI 26.93 kg/m    GA: NAD, pleasant  HEENT: nl sclera/conj  MSK: can't extend 5th fingers, no major swelling over hands  Skin: no rash  Neuro: non focal  Psych: nl affect      There is currently no information documented on the homunculus. Go to the Rheumatology activity and complete the homunculus joint exam.  Joint Exam 02/01/2024     No joint exam has been documented for this visit          Data   Data      10/28/2022   BEAUCHAMP-28 (ESR) --   BEAUCHAMP-28 (CRP) --   Tender (BEAUCHAMP-28) 2 / 28    Swollen (BEAUCHAMP-28) 0 / 28    Provider Global --   Patient Global --   ESR 17 mm/hr   CRP --     No results found for any visits on 02/01/24.  CBC RESULTS: Recent Labs   Lab Test 09/07/20  1147   WBC 4.1   RBC 5.09   HGB 13.0   HCT 43.0   MCV 85   MCH 25.5*   MCHC 30.2*   RDW 15.0        TSH   Date Value Ref Range Status   09/07/2020 0.77 0.40 - 4.00 mU/L Final   03/21/2019 0.53 0.40 - 4.00 mU/L Final   10/30/2018 1.06 0.40 - 4.00 mU/L Final   11/26/2016 0.87 0.40 - 4.00 mU/L Final     T4 Free   Date Value Ref Range Status   01/31/2007 1.26 0.70 - 1.85 ng/dL Final     Rheumatoid Factor   Date Value Ref Range Status   09/30/2020 <7 <12 IU/mL Final   05/06/2016 <20 <20 IU/mL Final       Reviewed Rheumatology lab flowsheet       "

## 2024-02-01 NOTE — NURSING NOTE
Is the patient currently in the state of [place]? YES    Visit mode:VIDEO    If the visit is dropped, the patient can be reconnected by: VIDEO VISIT: Text to cell phone:   Telephone Information:   Mobile [phone]       Will anyone else be joining the visit? NO  (If patient encounters technical issues they should call [phone])    How would you like to obtain your AVS? MyChart    Are changes needed to the allergy or medication list? No    Reason for visit: MARILUZ KUMARI

## 2024-02-01 NOTE — LETTER
2024       RE: Samara Oropeza  [address]  [facility] [address]     Dear Colleague,    Thank you for referring your patient, Samara Oropeza, to the [facility] RHEUMATOLOGY [facility] [place] at [facility]. Please see a copy of my visit note below.    Virtual Visit Details    Type of service:  Video Visit     Joined the call at 2024, 12:57:41 pm.  Left the call at 2024, 1:10:24 pm.    Originating Location (pt. Location): Home  Distant Location (provider location):  On-site  Platform used for Video Visit: [facility]    Office Visit Details      Rheumatology Clinic Return Visit Patient     Samara Oropeza MRN# [id number]   YOB: 1995 Age: 28 year old     Date of Visit: 2024   Last seen: 2023       Assessment & Plan    Assessment & Plan  Samara is a 28 year old  female (ADRIAN 21) with Behçet's disease (pathergy, oral/genital ulcers, polyarthralgia) who presents today for RECHECK  .    # Behçet's disease (pathergy, arthralgias, recurrent oral / genital ulcers)  # s/p delivery on 2023 with high-risk pregnancy, complicated by pre-eclampsia,  birth but healthy baby    10/2022:  Mrs. Oropeza arrives for follow-up of her Behçet's disease that is more active at present with recent decrease of her Otezla (60mg --> 30mg) recommended by [facility]. Previous discussion with her OB providers had been to decrease to the lowest effective dose, clearly 30mg is not effective as she describes worsened oral ulcers, genital ulcers, arthralgias, abdominal pain, and thrombophlebitis. Although there is not a great deal of evidence for Otezla safety in pregnancy, its mechanism of action would suggest it. Counterbalancing these concerns are what we understand of Behçet's in pregnancy which more often improves but in some cases (~29%) becomes worse, and can flare more often in the 1st trimester and  post- period.  She also describes some vision changes, which in the context of Behçet's is conerning. She has not seen Ophthalmology in many years; I will place a referral today.     2023: Stable today, on otezla 30 mg bid, breastfeeding, MFM is ok with breastfeeding, discussed ACR reproductive guideline, it does not recommend otezla during pregnancy and breastfeeding given lack of data; however Samara remained on it during pregnancy and now breastfeeding as stopping it leads to severe flare of her behcet and benefits of staying on it outweighs risks. Her MFM provider is aware of staying on otezla and is ok with it during breastfeeding.    23: Doing well, remains on otezla. Has had some interruptions in doses. Today has back and hand pain, and ankle swelling. Will start celebrex, ok with breastfeeding.     Today 2024:    Behcet's oral/vaginal ulcers are under fair control on otezla, will continue. But she continues to have active inflammatory arthritis, she failed celebrex. Will add  mg bid; risks were discussed. HCQ is safe in pregnancy and breastfeeding in case of pregnancy in future . Will check labs.    Plan:    Add plaquenil 1 tab twice a day with food    Labs    Return video in about 3-4 months      Dominga Sosa MD    Orders Placed This Encounter   Procedures    Vitamin D Deficiency    Ferritin    Iron and iron binding capacity    ALT    Albumin level    AST    Creatinine    Complement C4    Complement C3    CRP inflammation    DNA double stranded antibodies    Erythrocyte sedimentation rate auto    UA with Microscopic reflex to Culture    Protein  random urine    Creatinine random urine    Cyclic Citrullinated Peptide Antibody IgG    Rheumatoid factor    ANCA IgG by IFA with Reflex to Titer    Myeloperoxidase and Proteinase 3 Panel    CBC with Platelets & Differential                Medical History   History of Present Illness  Samara Oropeza is a 27 year old female who  presents for follow-up of her Behçet's in the context of pregnancy.      [place] Marilyn [place] [facility] [place]    Original presentation 2014:     Ms Oropeza is a 19 y.o. female who presents with one year of presentation of painful oral ulcers (monthly) once that have worsened with two episodes in the last two months that presented with painful vulvar or vaginal ulcers (2 episodes so far every month) [not sure if they leave scar] as well that timing coincides with [place] [place]   ORAL ULCERS: last two episodes were severe, painful, white base with erythematous halo, that appear and disappear in the matter of 1-2 weeks without, does not bleed and doesn't respond to any treatment used (magic mouthwash), 10-15 in number with the biggest one being dime size.      VAGINAL ULCERS: 2-3 in number between labia majora and minora that occur simultaneously with oral ulcers, painful, non bleeding ulcers that are erythematous, no pus.      FOLLICULITIS: patient reports having spots in legs specially around ankle and knee, but arms, and neck are affected as well. Small lesions that resemble ingrown hair, most are red erythematous elevated lesions, well demarcated, some with liquid that patient refers as pimple like.      SKIN RASHES: welts and red macules with well defined borders that are pruritic and non-ulcerative on chest, arms and back. Benadryl relieves.      ARTHRALGIAS: In descending order of severity: hips, knees, wrists, shoulders, neck, lumbar, fingers.   C/o morning stiffness in hips, back and neck lasting for about until noon.      Ms. Oropeza reports they present in severe flares and never fully resolve, but do get better. She has seen some swelling and warmth on the affected joints but has not noticed and redness. Has tried Tylenol, ibuprofen, and hydrocodone with not much benefit.      FEVERS: Reports 3-4 sporadic episodes per month of self limited fevers of 38 C that occur during the evenings and  associate facial flushing.      HEADACHES: occur daily and are bitemporal and irradiate occipitally, pulsatile in nature, intensity varies from intense to mild, are worsened with movement. On treatment with ibuprofen and somatriptan without any positive results.      VISION CHANGES: Patient reports that suddenly she would only see a gray blotch in her right eyes and would persist like this for a day and a half. Also reports blurriness in her left eye. Presence of pain and burning sensation of eyeball with associated redness. Has changed prescription glasses in the matter of 2 years 3 times. She has had opthalmology exam and was not suggestive of any evidence of uveitis.   She was also evaluated in ED for a dizzy spell.      ABD PAIN W/ INTERMITTENT DIARRHEA AND CONSTIPATION: Patient reports abdominal pain that is intermittent, periods of 7 days without bowel movement and feeling bloated with change of pattern to diarrhea (liquidy without blood nor mucus, non foul smelling). Has taken Bentyl, Zegrid, and rinetidine with mild clinical improvement.  She also reports small red nodules after each needle stick for blood draw.   Neurology saw her back then and was not impressed with her presentation as physical examination was normal. Also MRI brain normal: Findings: No definite hemorrhage, mass affect, midline shift, or ventriculomegaly is noted.There are a few scattered non specific white matter T2 hyperintensities. Axial diffusion weighted images are unremarkable.     The major vascular structures appear patent. There is opacification and severe mucosal thickening in the right maxillary sinus. The remaining paranasal sinuses, and mastoid air cells are clear. Orbits are unremarkable  She has + HSV-1 IGG. Seen ID. Negative for Herpes simplex virus culture. HSV IGM I/II COMBINATION SENT TO [facility]  RESULTS = <0.91  REF RANGE: <0.91 = NEGATIVE  ID did not think HSV could explain her mouth and genital sores.      CRP within  normal limits. HIV negative. CBC within normal limits; UA negative. Creatinine within normal limits   CYNDIE neg.  Antigliadin neg.   ANti TTG neg.   [facility] 2014: Colonoscopy and endoscopy neg; result not available. Not sure if biopsies were done.   Raynaud's phenomenon:  Onset: about 2013  Digits: all fingers  Digital ulcers: no  Color changes: white ---> purple and red  Duration of an attack: About couple of hours per mom  Pain during attack: not clear pain but bruise like feeling  Frequency of attacks: few times a month  Family history of Raynauds: no  GERD: very long time     No hemoptysis.   No miscarriages/ no thrombosis in past.   No family or personal history of psoriasis, ulcerative colitis or chron's disease.   No buttock pain or low back pain or stiffness in AM     Interim history:  Dec 2014- Multiple mouth ulcers and did not eat for 5 days. This coincided with periods. Colchicine 0.6mg PO BID started in Nov 2014.   Prednisone taper in Dec 20mg to 0 in 4 weeks. She also used magic mouthwash. Kenalog cream. After going to the ER, 3 days later her ulcers were better. She thinks it improved after the menstruation stopped.   LMP 3 weeks ago.   COLCHICINE HAS HELPED A LOT REDUCING THE INTENSITY OF MENSTRUATION ASSOCIATED ORAL AND VULVAR ULCERS.      Interim history: 6/15     Since last visit only two episodes of mouth sores- small sized 6   No genital ulcers since last visit.   Colchicine 1.2 mg in AM and 0.6mg in PM keeps her symptoms under control.      Admitted in 5/15:  Admission Diagnoses:  - LUE weakness  - spell  - hx of migraines  - hx of Tourette syndrome  - hx of Behcet's disease     Discharge Diagnoses:   - spell likely 2/2 anxiety  - hx of migraines  - hx of Tourette syndrome  - hx of Behcet's disease     CT and MRI brain was normal.      Seeing Dr. Lilliana Miramontes soon as outpatient.      Dr. Garcia did not find any intraocular inflammation.       Interm 10/30/2015  ED for migraine. Continues to  "see neuro - MRI brain without lesions noted. Saw GI - upper barium normal. May be considering repeat colo. Worsening lesions in mouth and genitals. Has had continuous lesion in mouth x 2 months. 2 genital ulcers. Had fracture of right sesamoid in foot. Continues to have low grade fevers. New folliculitis on chest, legs and arms.      Interim hx: 1/11/2016    Pt states that since last visit she continues to have oral ulcers and has noted more folliculitis-like lesions on her lower extremities.  She states that on her right lower leg she had a red macular spot that has since resolved.  She denies uveitis.  She states that the colchicine initially helped but then stopped working.  She takes 0.6 mg TID.  She stopped taking her prednisone last week as she feels like this hasn't helped.  She hasn't had any episodes of vaginal ulcers.      She saw GI who stated she has gastroparesis.  She is seeing Cardiology later this week for possible syncopal episodes.       Interim history 5/16  Mouth ulcers X 1 last month  Genital ulcers - none since last visit.      Bilateral MCPs pain, knee pain and low back pain +ve increased since last visit  Morning stiffness X 2 hours (increasing)   5-6/10     \"overall myalgia\" has gotten worse    C/o pseudofolliculitis lesion on anterior shins bilaterally worse     Interim history: (7/18/2016)  Patient has just started Humira for 2 doses on 7/1 and 7/15 and reported minimal improvement of her hip pain but otherwise, did not notice anything different.      She reported painful mouth ulcer once a month since last visit but noticed that it has been getting deeper.   Denied any genital ulcers.     Still has ongoing bilateral MCPs pain, knee pain but this has been intermittent and she did not have any much pain today. She reported 2-3 hours morning stiffness of both hands and pain score of 6/10 at her knees and 8/10 of her hands but currently takes no pain medication except prn ativan which she " takes when her muscle is really tight.      The only concern is that she gained weight even though she has not been eating much (eat once a day) and do exercises every day for 1 hour (with video of yoga, pilates). Her mother wonders if she needs to have some labs work up include sex hormone, thyroid, cortisol.     Interval history 9/14/16  Patient was started Humira at the last visit, patient reports worsening of skin ulcers since starting Humira and stopped taking Humura for the past 2 weeks. Patient reports oral and genital ulcers has been stable even before starting Humira and has not had any interval oral/genital ulcers. However she reports recurrent skin ulcers occurring on a daily basis that affects her chest and anterior legs. Patient also has stopped taking prednisone, she reports that she doesn't feel the prednisone helps, her last dose of prednisone was 6+ months ago. Patient also report worsening low back pain that sometimes causes her to limp.     In the interval patient also visited ED on 8/31/16 for left hand pain and what the patient describes as phlebitis, no medication or procedure was done and the patient was discharged with a splint. Patient also reported 1 episode of chest pressure that was associated with dyspnea and numbness of the left arm, she was shopping in a supermarket at the time of onset, and the pressure resolved after she took a nap.     Patient denies any infectious symptoms in the interval, no fever, chills, night sweat, cough, dysuria, denies eye pain or visual changes.     November 4, 2016  Oct - had one genital ulcer  Oral ulcers X 5- around menstruation time.   Knee pain- chronic on the left side  Dizziness spells - on and off; been to the ER for that in the past.   On and off migraines  July 2013 - s/p MPFL reconstruction plus LRL 7/2013  Morning stiffness in knee (left) X 1 hour     Severe jaw pain and chest pain- patient wondering if this is Tourette's or esophageal spasms.  Cardiac workup negative.   Fever      January 13, 2017  Yesterday in ER [facility] [place] with orthostatic syncope from dehydration.   Chest pain on and off still continues. Painful with deep breaths.   Oral ulcers - few minor ones lasting for one week;   One major one in roof of mouth lasting for about one week.   Knee pain +ve - reviewed the recent MRI with her orthopedic surgeon.   On and off migraines  otezla is approved. Still waiting to receive the meds.      March 31, 2017  Otezla current dose 15mg PO BID.   She is tolerating okay at this dose and is willing to increase the dose further up to 30mg BID.   Her joint pains are better on otezla. Knee pain is also better.   Back and neck pain +ve 8/10 when it is worse. Intramuscular botox injections were given on Monday in PMR.   2 minor genital ulcers in the interim- resolved.   Few oral ulcers in the interim- resolved.   Nasal ulcer - resolved.   Migraines on and off.   Nausea with otezla but improving.   Dizziness and blackouts on and off. She fell once and hurt her ankle. Wearing boot in left leg.   Complete ROS negative except for above     May 17, 2017  Tolerating otezla better. Not throwing up anymore. Current dose 20mg in AM and 30mg in PM (2nd week).   Patient thinks that her oral ulcers frequency and severity are improving with otezla. Also no genital ulcers in the interim.   Currently on doxycycline for bronchitis/?pneunomia. 4 more days left.      Joint pain history  2 weeks ago/ dx with pneumonia 1 week ago/  Involved joints: all over  Pain scale: 6.5/10   Wakes the patient from sleep : Yes  Morning stiffness: Yes for 120 minutes  Meds used:otezla,colchicine     Interim history  Since last visit:  1. Infections - Yes/ pneumonia  2. New symptoms/medical problem - Yes/ thinks she may have had a mini-seizure about 10 days ago ; did not seek medical attention; did not reoccur after that. Patient seeing PCP today.  3. Any side effects from Rheum medications -vomiting a  lot (resolved)  3. ER visits/Hospitalizations/surgeries - Yes  4. Last PCP visit: has an apt. today     Patient still getting double vision. Floaters present.      Therapist recommended psychiatry evaluation. Patient does not want to see psychaitry. Patient will see PCP today.      August 22, 2017  Have you ever seen a rheumatologist Yes Who You When 5/17/17     NO genital ulcers in the interim.   Mouth ulcers- three in the back of the throat and roof of the mouth last month.      Joint pain history  Onset: doing alittle worse / cut her otezla back to 30mg  Daily due to GI problems  Involved joints: all over her body. Been having more black out episodes, been having more chest pains.also has raised bumps on both legs from the knees down that itch and are painful   Pain scale:  5.5/10     Wakes the patient from sleep : Yes  Morning stiffness:Yes for 120 minutes  Meds used:otezla, colchicine (three pills daily)     Interim history  Since last visit:  1. Infections - Yes stomach issue  2. New symptoms/medical problem - No  3. Any side effects from Rheum medications -nausea from otezla  3. ER visits/Hospitalizations/surgeries - Yes, ER for foot, ankle injury from passing out and fell down the steps. Hit her head another time from passing out. Alcohol poisoning in July  4. Last PCP visit: 6/23/17 September 19, 2017  Have you ever seen a rheumatologist Yes Who You When 8/22/17  Joint pain history  Onset: pt states that she hurts everywhere for 6 days  Involved joints: all joints  Pain scale:  7/10     Wakes the patient from sleep : Yes/ not sleeping at all  Morning stiffness:Yes for 180 minutes  Meds used:colchicine, otezla, magic mouth wash, lidocaine gel  Last one week: increased joint pain; and genital ulcer  Genital lesion (dimed size) in the last one week  After botox a week ago, she had fever 101 for three days; near syncopes. Back pain; floaters  Chest pain , mouth sores, hand pain, morning pain were all better  with otezla 30mg twice daily.     Interim history  Since last visit:  1. Infections - No  2. New symptoms/medical problem - No  3. Any side effects from Rheum medications -nausea from the otezla  3. ER visits/Hospitalizations/surgeries - No  4. Last PCP visit: may 2017      October 18, 2017     Have you ever seen a rheumatologist Yes Who You When 9/19/17  Joint pain history  NO mouth or genital sores in the last 4 weeks.   Medrol dosepak helped with visual symptoms but not joint pains.      Onset: pt states that she is doing better than last time with her behcet's. Today has severe stomach pains, states that she is constipated. Pt had a seizure 2 days ago. Does have a metallic taste in her mouth  Involved joints: hands , neck, shoulders  Pain scale:  9/10 from stomach pain;      Wakes the patient from sleep : Yes  Morning stiffness:Yes for 120 minutes  Meds used:cochicine , otezla 30mg twice daily     Interim history  Since last visit:  1. Infections - No  2. New symptoms/medical problem - Yes/ the cartilage in her chest is inflamed  3. Any side effects from Rheum medications -nausea from the otezla  3. ER visits/Hospitalizations/surgeries - No  4. Last PCP visit: yesterday     January 16, 2018  Have you ever seen a rheumatologist Yes Who You When 10/18/17  Joint pain history  Onset: pt states that she was in the hospital for 5 days before maribell, they told her she had lesions in her brain in the white matter. Had blood work drawn in the ER and they told her her inflammatory markers were elevated. Was seen in the ER last week for vomiting and diarrhea, not eating. Saw Gastro. On 1/10/18. 1.5 weeks ago she had an endoscopy and colonoscopy. She has been having elbow  And hands and knee pain, loosing use of her hands. Been dropping things. Also states that she has been getting lesions up her nose  Involved joints: see above  Pain scale:  8/10     Wakes the patient from sleep : Yes  Morning stiffness:Yes for 120  minutes  Meds used:colchisine, otezla, magic mouth wash, kenolog cream, lidocaine gel     Interim history  Since last visit:  1. Infections - Yes/ had an infection in her jaw. Having sinus issues  2. New symptoms/medical problem - Yes/ states that she is having problems walking, states that she was bouncing when she was walking  3. Any side effects from Rheum medications -otezla, nausea  3. ER visits/Hospitalizations/surgeries - Yes/ see chart  4. Last PCP visit: November 2017 April 17, 2018  Have you ever seen a rheumatologist Yes Who You When 1/16/18  Joint pain history  Onset: pt states that she is not doing well. She is having increased stomach issues, only eats 2 times in 4 days unable to keep the otezla down due to vomiting . Has sleep study done in 1 week; no genital ulcers since last appointment. 6 small mouth sores since last appointment.   Involved joints: see above   Pain scale:  7/10     Wakes the patient from sleep : Yes  Morning stiffness:Yes for 60 minutes  Meds used:otezla , colchicine, diclofenac gel, magic mouthwash, lidocaine gel      Interim history  Since last visit:  1. Infections - Yes/ had a sinus infection, thinks she is getting sick now  2. New symptoms/medical problem - Yes/ neurology sent pt to cardiology. Has a heart condition but not sure what . She is seeing a ortho. Due to knee pain   3. Any side effects from Rheum medications -nausea/vomiting from the otezla   3. ER visits/Hospitalizations/surgeries - Yes/ seen in ER   4. Last PCP visit: yesterday     July 17, 2018  Have you ever seen a rheumatologist Yes Who You When 4/17/18  Joint pain history  Onset: pt Is here for a follow-up states that she started to get lesions on her legs , face and arm. Hurts all over, lower back is very painful. Right hand and wrist area are numb  Involved joints: see above  Pain scale:  7/10   worse in the morning  Wakes the patient from sleep : Yes/ does not go to sleep till late  Morning  stiffness:Yes for 4-5 hours minutes  Meds used:colchicine, otezla has  Not started otezla yet due to extreme nausea      Interim history  Since last visit:  1. Infections - Yes/ had a bacterial infection in her pelvic area was hospitalized  2. New symptoms/medical problem - Yes/ hands are swelling up. Having orthopedic issues. Was having problem with her veins sticking up, and very painful. Has happened multiply times   3. Any side effects from Rheum medications -see above  3. ER visits/Hospitalizations/surgeries - Yes/ hospital and ER for bacterial infection  4. 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 ER said may have blood clot in calf, but when did MRI, stated body may have broken it up on it's own. Elevated D-dimer  Involved joints: Knees, neck, hands  Pain scale:  6.5/10     Wakes the patient from sleep : Yes  Morning stiffness:Yes for 180 minutes  Meds used:hyoscyamine, not taking otezla. Last dose Sep. Patient did not want to take otezla with the antibiotics.      Last major mouth ulcer- aug or Sep  No genital ulcers in the last 6 months.      Interim history  Since last visit:  1. Infections - Yes, UTI's twice a month since last visit  2. New symptoms/medical problem - No  3. Any side effects from Rheum medications -otzela causes nausea  3. ER visits/Hospitalizations/surgeries - Yes, 1/2/19 repaired some ligaments and mass taken out, also joint build up removed from a previous injury  4. Last PCP visit: 12/5/19 June 12, 2019  Have you ever seen a rheumatologist yes Who you When 1/9/19  Joint pain history  Onset: Patient is here for a follow up. A lot of infections and in and out of the hospital, who wanted her to follow up with Rheumatology again.  Involved joints: knees, legs, back, neck, shoulders, ankles  Pain scale:  6.5/10     Wakes the  patient from sleep : Yes  Morning stiffness:Yes for 120 minutes  Meds used:magic mouthwash, colchicine     Interim history  Since last visit:  1. Infections - Yes, staph  2. New symptoms/medical problem - kidney failure  3. Any side effects from Rheum medications -none  3. ER visits/Hospitalizations/surgeries - Yes, 3 hospitalizations, and surgeries,  4. Last PCP visit: 6/11/19        Today 9/30/2020: New to me, establishing care. Flaring off otezla.     816   Reports worsening oral ulcers and joint pain and skin rash.     No vaginal ulcers currently. Last ones were 5 months ago.     Gets oral ulcers monthly, not today, had them last few days ago. They come up as flare up around period time. They self-resolve.     Thinks colcrys is helping but not enough, lost some benefit. No longer has diarrhea from it. the dose is 0.6 mg bid.     Came off otezla last year when she had severe staff infection, there was drug drug interaction with vancomycin. Wants to go back on it.     Skin lesions over chest are clearing up. Has skin lesions over her legs.     She is off of otezla >1 yr. She had daily vomiting and abdominal cramps initially which later resolved after 2 months.      Today, is her last day liz her health insurnaace  .     Reports pain and swelling liz hands. Drops things, lost strenghth. Veins look inflamed. Hands are swollen in AM and before bedtime. AM stiffness is 2 hours. can t tolerate ibuprofen.     Prednnisonne does not help much.     Wants to try eleve.     Had 2 blood clots over L arm, finished xraalto 4 months ago.      Was told to have severe dry eyes, hs not had eyes checked> 1 yr as was in the hospital with staff infection. systanee nd gentle eyedrops help some, sometimes gets sharp pain and and blurry vision in her eyes from dryness. No h/o uveitis.     has dry mouth, drinks water.     Gets T  F sometimes. It is sporadic.     Lost hair.     Gets intermittent CP. Had several hospital visits and  admissions in the pst for it. lorazepam helped witch chest spasms, she does not like pain meds.     She was prescribed 0.5 mgq d prn, 10 tbs/monthss.     She gets dry cough, just started using albuterol inhaler, it helps.     No SOB.     Has gastroparesis, IBS  nd h/o severas admission for constiption, colon blockage with impaction and gets bloating. has lost follow up with GI.     She is working with  to get medical assistance to get insurance back by early next year. She filed it again.     Gets botox inj every 3 months nd they help, has to cancel 10/20 botox inj s will not have insurance. they came back yesterday.     last seizure waas last year. still gets black out spells, salts ne was last week.    derm eye gi     FHx: Both parents have RA.  SHx: She was working in a [employer] shop, it was closed because of pandemic. sexually active, not on oral contraceptives. She thinks she had a misccraaaiaage last wk, had n period x2 months then mueller bleeding a lot, dark red and was different from period. Felt something came out that she feels she miscarried, no pos pregnancy test. No smoking. rarely drinks ETOH.  [facility]   thumbs between MCP tender   otezla helped with skin lesions, oral ulcers, joint pain.  colcrys  sjogre aleve biotene voltaren [id number]        8/11/2022: Samara presents for urgent visit to discuss m/o Behcet's flare during pregnancy, she is   She is pregnant 10 wk 3 days with ADRIAN 3/6/2023.  In 2020, had 1st trimester miscarriage.  Has LBP, ankle pain/swelling.   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 Discussed with MFM, back on low dose otezla since last week, only 1 tab a day.    Interval History 10/28/2022  Mrs. Oropeza was last seen 08/11/22. At that time she was advised to continue otezla (1 tablet BID) and remain off colcrys. Since that time, she has instead taken otezla 1 tablet daily. She notes more  joint pain in hands, wrists, cervical spine, knees with 4 hours of monring stiffness. Previously <1hr with full strength.     Had a few genital ulcers recently which only lasted a few days. Oral ulcers have been more active of late and are currently healing.     Abdominal pain on L side, pain is always worse after eating. Out of the phase of her pregnancy with morning sickness, at 22w on Monday.     She describes a few fevers at the end of September with hot flashes and cold sweats with Tmax 100. Called OB-GYN who instructed she should go in if persistent, though it resolved within a few days.     More easy bruising - thighs, she is currently on lovenox and baby aspirin. Previous history of clots especially with interventions. She recently had an issue with superficial thrombophlebitis following a blood draw.     Seeing more floaters in her vision. Sometimes tiny and clear, but can also be larger and 'gray', up to 1/4 of her vision. Has not seen Ophthalmology in many years.     More frequent migraines - did a nerve block recently but this has worn off, previous botox injections.     ADRIAN 03/06/23.    1/25/23:    Baby is growing small. Had high BP yesterday, they would monitor it. DBP was 90.    Increasing otezla to bid helped. It helped with ulcers. Has skin breakdown since [place], never had it like this over her chest, but still it feels related to Behcet.    Hands and ankles are painful and swoleln, ankles are new but had hand pain with behcet before,. This started fater entering 3rd [facility]. Her ADRIAN might be earlier based on baby's growth, induction at 37 wk, progress at 39. nex twk will have another check.    Still has the neck pain. Still gets eye floaters.    6/21/2023:    She delivered 1 month early due to pre-eclampsia. Had Mg infusion, had bleeding issues, spent a week. Was on BP med till a month ago, now stable. Was induced, had NVD. No blood transfusions needed.    Her baby girl Lashanda was born  2/12/2023, small, slow growth but healthy with no fetal deformities. Had vaginal sores after giving birth. Still gets mouth ulcers, one oral ulcer now, no vaginal ulcers now. Plans to breast feed x 7 months. Does breastfeeding. Saw OB/[facility]N in 4/2023 [facility]. They are aware she is on otezla.    She was on ASA 81 mg every day during pregnancy.    Today 9/27/23: Doing well, less behcet flares since giving birth. Most recent flare on her chest, now healing. No new oral or vaginal ulcers.     She has been having back pain that shoots down her right leg since June and has been utilizing PT without improvement.     She also has had swelling and pain in both hands and right wrist, as well as both ankles L>R.     She notes missing some doses of otezla due to running out of refills and avoiding taking it without food. She has been eating less because she has been more tired since having her daughter.      Today 2/1/2024:    Has back pain. Had epidural inj one wk ago, has to wait x 2 weeks, if no help to get surgery for bulging disc    Some oral lesions 2wk ago flare with swollen hands, but currently has no oral ulcers.     Hands hurt today with pain level about 4/10. Celebrex did not help much.    No vaginal ulcers    Reports loss of appetite past 5 months    Her baby girl was sick x past 2 weeks, just got better last night, she had RSV. Samara did not get much sleep, did not eat much over past 2 weeks because of this.    Review of Systems   A comprehensive ROS was done. Positives are per HPI.    Past Medical History  Past Medical History:   Diagnosis Date    Anemia     Anxiety     Arthritis     Behcet's disease (H)     Cervical adenitis May 2010    Chronic abdominal pain     Constipation, chronic 1994    Fibromyalgia     Gastro-oesophageal reflux disease     Gastroparesis     Irregular heart beat     tachycardia, has had workup    Migraines     Neuromuscular disorder (H)     fibramyalgia    Palpitations     PONV  (postoperative nausea and vomiting)     Seizure (H)     Seizures (H)     unknown etiology    Syncope     Tourette's       Past Surgical History:   Procedure Laterality Date    ARTHROSCOPY ANKLE, OPEN REPAIR LIGAMENT, COMBINED Left 9/25/2019    Procedure: LEFT ANKLE ARTHROSCOPY WITH LIGAMENT REPAIR;  Surgeon: Andres Johnson DPM;  Location: [facility]    ARTHROSCOPY ANKLE, REPAIR LIGAMENT Left 1/2/2019    Procedure: Ankle arthroscopy and sinus tarsi evacuation, ligament repair, left lower extremity;  Surgeon: Andres Johnson DPM;  Location: [facility] OR    ARTHROSCOPY KNEE WITH PATELLAR REALIGNMENT  7/25/2013    Procedure: ARTHROSCOPY KNEE WITH PATELLAR REALIGNMENT;  Left Knee Arthroscopy, Medial Patellofemoral Ligament Reconstruction with Allograft  ;  Surgeon: Jennifer Acevedo MD;  Location: [facility]    COLONOSCOPY  2015    DENTAL SURGERY  1996    Teeth removal    ENDOSCOPY UPPER, COLONOSCOPY, COMBINED  2005     ESOPH/GAS REFLUX TEST W NASAL IMPED >1 HR N/A 2/15/2017    Procedure: ESOPHAGEAL IMPEDENCE FUNCTION TEST WITH 24 HOUR PH GREATER THAN 1 HOUR;  Surgeon: Timothy Matta MD;  Location: [facility]    IR PICC PLACEMENT > 5 YRS OF AGE  3/13/2019    IR UPPER EXTREMITY VENOGRAM LEFT  2/25/2020    IRRIGATION AND DEBRIDEMENT FOOT, COMBINED Left 3/12/2019    Procedure: COMBINED IRRIGATION AND DEBRIDEMENT LEFT ANKLE;  Surgeon: Micha Glover MD;  Location: [facility]    IRRIGATION AND DEBRIDEMENT LOWER EXTREMITY, COMBINED Left 5/7/2019    Procedure: 1.  Excision of wound down to and including deep fascia, less than 20 cm2.  2.  Irrigation and debridement, left ankle.;  Surgeon: Andres Johnson DPM;  Location: [facility] OR    PICC INSERTION Left 05/05/2019    4Fr - 45cm (5cm external), Basilic vein, SVC RA junction      Patient Active Problem List    Diagnosis Date Noted    Preeclampsia 02/10/2023     Priority: Medium    Infection of joint of ankle (H) 05/06/2019     Priority: Medium    Cellulitis 03/09/2019      Priority: Medium    Displacement of lumbar intervertebral disc without myelopathy 11/13/2018     Priority: Medium     Overview:   Created by Conversion      Iron deficiency associated with nonfamilial restless legs syndrome 11/13/2018     Priority: Medium    Enthesopathy of hip region 11/13/2018     Priority: Medium     Overview:   Created by Conversion      Tourette's syndrome 11/13/2018     Priority: Medium     Overview:   Created by Conversion      Somatic symptom disorder 06/01/2018     Priority: Medium    Pelvic floor weakness 04/25/2018     Priority: Medium    Spells of decreased attentiveness 12/19/2017     Priority: Medium    Mobile cecum (H28) 11/09/2017     Priority: Medium     Cecum noted in Right lower quadrant on 4/17 CT scan, and in Left upper Quadrant on CT on 11/2017.      Vitamin D deficiency 10/11/2017     Priority: Medium     How low, unknown/not found. On D when tested at 27. Starting cholecalciferol October 2017. Needs recheck.      Convulsions, unspecified convulsion type (H) 10/03/2017     Priority: Medium    Transient alteration of awareness 10/03/2017     Priority: Medium    Chronic pain syndrome 07/27/2017     Priority: Medium    Major depressive disorder, recurrent episode, moderate (H) 06/27/2017     Priority: Medium    Cervical pain 05/02/2017     Priority: Medium    Acute left ankle pain 03/31/2017     Priority: Medium    Cervical dystonia 03/28/2017     Priority: Medium    PTSD (post-traumatic stress disorder) 01/17/2017     Priority: Medium    Patellofemoral instability 10/20/2016     Priority: Medium    Fibromyalgia 08/04/2016     Priority: Medium    Rheumatoid arthritis of multiple sites without rheumatoid factor (H) 08/04/2016     Priority: Medium    Raynaud's disease without gangrene 08/04/2016     Priority: Medium    Chronic abdominal pain 08/04/2016     Priority: Medium    Palpitations 01/12/2016     Priority: Medium    On colchicine therapy 10/30/2015     Priority: Medium     Spell of shaking 05/06/2015     Priority: Medium    Migraine 02/04/2015     Priority: Medium    Behcet's disease (H) 12/10/2014     Priority: Medium    Headaches due to old head injury 11/12/2013     Priority: Medium    Milk protein intolerance 10/11/2013     Priority: Medium    Intestinal malabsorption 10/11/2013     Priority: Medium    Concussion 02/13/2013     Priority: Medium     Jan 2013, with prolonged recovery- followed by sports med        Knee pain 01/03/2013     Priority: Medium    Generalized anxiety disorder 06/25/2009     Priority: Medium    Tics - Tourette syndrome 05/18/2009     Priority: Medium     Followed by psychotherapy. Symptoms well managed. Originally diagnosed at [facility]. (Dr. Simpson)          IBS (irritable bowel syndrome) 05/18/2009     Priority: Medium    Allergic rhinitis 05/18/2009     Priority: Medium    GERD (gastroesophageal reflux disease) 01/10/2008     Priority: Medium    Gastroparesis 1994     Priority: Medium      Family History   Problem Relation Age of Onset    Depression Mother     Neurologic Disorder Mother         Migraines, take imitrex injection.  Also in maternal grandmother.      Alcohol/Drug Father     Hypertension Father     Depression Father     Osteoarthritis Father     Cardiovascular Maternal Grandmother     Depression Maternal Grandmother     Hypertension Maternal Grandmother     Alzheimer Disease Maternal Grandmother     Cardiovascular Maternal Grandfather     Hypertension Maternal Grandfather     Depression Maternal Grandfather     Alcohol/Drug Maternal Grandfather     Diabetes Maternal Grandfather     Cardiovascular Paternal Grandmother     Hypertension Paternal Grandmother     Cardiovascular Paternal Grandfather     Hypertension Paternal Grandfather     Glaucoma No family hx of     Macular Degeneration No family hx of       Allergies   Allergen Reactions    Amoxil [Penicillins] Rash     Dad unsure of reaction.    Bee Venom Anaphylaxis     Bioflavonoids Anaphylaxis    [place] Anaphylaxis     All [place]    Contrast Dye Rash     Contrast Media [facility] [place], 04/09/2014.; [facility] [place], 04/09/2014.  NOTE: this is a contrast media oral with iodine. Premedicate with methylpred standard for IV contrast, request barium contrast for oral contrast.    Iodinated Contrast Media Hives and Rash     [facility] [place], 04/09/2014.; [facility] [place], 04/09/2014.  NOTE: this is a contrast media oral with iodine. Premedicate with methylpred standard for IV contrast, request barium contrast for oral contrast.    Pineapple Anaphylaxis, Difficulty breathing and Rash    Reglan [Metoclopramide] Other (See Comments)     IV dose only, in ER, rapid heart rate.    Ace Inhibitors      Difficulty in breathing and GI upset    Amitiza [Lubiprostone] Nausea and Vomiting    Amoxicillin-Pot Clavulanate     Midazolam Unknown     parent states that when pt takes this medication, she wakes up being very violent .    Midazolam Hcl      Coming out of pelvic exam at age of 5, was kicking and screaming when coming out of the versed.    Other [No Clinical Screening - See Comments]      Bleech/ chest tightness, itchy throat, swollen tongue, hives    Tizanidine Other (See Comments)     Confusion, back pain, photophobia, abdominal pain, shaking, anxious      Adhesive Tape Rash    Azithromycin Hives and Rash    Cephalexin Itching and Rash    Sulfa Antibiotics Rash     Skin scarring      Current Outpatient Medications   Medication Sig Dispense Refill    albuterol (PROAIR HFA/PROVENTIL HFA/VENTOLIN HFA) 108 (90 Base) MCG/ACT inhaler Inhale 2 puffs into the lungs every 6 hours as needed for shortness of breath / dyspnea or wheezing 1 Inhaler 1    amitriptyline (ELAVIL) 25 MG tablet TAKE 1 TABLET BY MOUTH EVERY NIGHT AT BEDTIME 90 tablet 1    apremilast (OTEZLA) 30 MG tablet Take 1 tablet (30 mg) by mouth 2 times daily Hold for signs of infection, and seek  medical attention. 60 tablet 5    artificial tears OINT ophthalmic ointment 0.5 inch strip each eye at night 1 Tube 11    azelaic acid (FINACIA) 15 % external gel Once daily to scars, upper chest and small portion of back and spot on belly button. Hold if irritating 50 g 5    benzocaine (AMERICAINE) 20 % external aerosol Apply to perineum four times daily as needed for pain 57 g 3    benzocaine (TOPICALE XTRA) 20 % GEL Apply as needed locally to mouth or nasal ulcers for pain; 4 times daily as needed 30 g 1    betamethasone valerate (VALISONE) 0.1 % cream Apply topically 2 times daily as needed       bisacodyl (DULCOLAX) 5 MG EC tablet Take 2 tablets (10 mg) by mouth daily as needed for constipation 30 tablet 0    celecoxib (CELEBREX) 100 MG capsule Take 1 capsule (100 mg) by mouth 2 times daily as needed for pain 60 capsule 1    citalopram (CELEXA) 20 MG tablet Take 1 tablet (20 mg) by mouth daily 90 tablet 1    EPINEPHrine (EPIPEN 2-EAMON) 0.3 MG/0.3ML injection Inject 0.3 mLs (0.3 mg) into the muscle as needed for anaphylaxis 0.6 mL 3    etonogestrel (NEXPLANON) 68 MG IMPL Inject 68 mg Subcutaneous      fluocinonide (LIDEX) 0.05 % external gel Apply topically 2 times daily 60 g 11    gabapentin (NEURONTIN) 300 MG capsule Take 1 capsule (300 mg) by mouth every morning 60 capsule 1    hydrocortisone, Perianal, (ANUSOL-HC) 2.5 % cream Place rectally 3 times daily as needed for hemorrhoids 30 g 0    hydrOXYzine HCl (ATARAX) 25 MG tablet TAKE ONE OR TWO TABLETS BY MOUTH EVERY SIX HOURS AS NEEDED      hypromellose (GENTEAL) 0.3 % SOLN 1 drop every hour as needed for dry eyes       lactulose 20 GM/30ML SOLN Take 30 mLs by mouth 3 times daily as needed (for constipation) 300 mL 3    lanolin ointment Apply topically every hour as needed for other (sore nipples) 7 g 3    lidocaine (LMX4) 4 % external cream Apply topically once as needed for mild pain 120 g 1    LINZESS 290 MCG capsule TAKE 1 CAPSULE BY MOUTH EVERY DAY IN THE  "MORNING BEFORE BREAKFAST 90 capsule 0    mometasone (ELOCON) 0.1 % external ointment Apply topically 2 times daily 45 g 11    ondansetron (ZOFRAN ODT) 8 MG ODT tab Take 1 tablet by mouth every 8 hours as needed      polyethylene glycol (MIRALAX/GLYCOLAX) powder Take 1 capful by mouth 3 times daily      Prenatal MV-Min-Fe Fum-FA-DHA (PRENATAL 1 PO)       rizatriptan (MAXALT-MLT) 5 MG ODT DISSOLVE 1 OR 2 TABLETS IN THE MOUTH AS NEEDED FOR HEADACHE AT ONSET OF MIGRAINE, MAY REPEAT IN 2 HOURS AS NEEDED. MAX 30MG IN 24 HOURS AND MAX 5 DAYS PER MONTH      sodium fluoride 1.1 % CREA Apply 1 Application topically daily      triamcinolone (KENALOG) 0.1 % external ointment Apply twice daily as needed to lesions on the genitals and body. OK to use very sparingly on the face. 454 g 2    sucralfate (CARAFATE) 1 GM/10ML suspension Take 10 mLs (1 g) by mouth 4 times daily (Patient not taking: Reported on 2/1/2024) 1200 mL 2           Physical Exam   Ht 1.6 m (5' 3\")   Wt 68.9 kg (152 lb)   BMI 26.93 kg/m    GA: NAD, pleasant  HEENT: nl sclera/conj  MSK: can't extend 5th fingers, no major swelling over hands  Skin: no rash  Neuro: non focal  Psych: nl affect      There is currently no information documented on the homunculus. Go to the Rheumatology activity and complete the homunculus joint exam.  Joint Exam 02/01/2024     No joint exam has been documented for this visit          Data   Data     10/28/2022   BEAUCHAMP-28 (ESR) --   BEAUCHAMP-28 (CRP) --   Tender (BEAUCHAMP-28) 2 / 28    Swollen (BEAUCHAMP-28) 0 / 28    Provider Global --   Patient Global --   ESR 17 mm/hr   CRP --     No results found for any visits on 02/01/24.  CBC RESULTS: Recent Labs   Lab Test 09/07/20  1147   WBC 4.1   RBC 5.09   HGB 13.0   HCT 43.0   MCV 85   MCH 25.5*   MCHC 30.2*   RDW 15.0        TSH   Date Value Ref Range Status   09/07/2020 0.77 0.40 - 4.00 mU/L Final   03/21/2019 0.53 0.40 - 4.00 mU/L Final   10/30/2018 1.06 0.40 - 4.00 mU/L Final   11/26/2016 0.87 " 0.40 - 4.00 mU/L Final     T4 Free   Date Value Ref Range Status   01/31/2007 1.26 0.70 - 1.85 ng/dL Final     Rheumatoid Factor   Date Value Ref Range Status   09/30/2020 <7 <12 IU/mL Final   05/06/2016 <20 <20 IU/mL Final       Reviewed Rheumatology lab flowsheet       Dominga Sosa MD

## 2024-02-02 ENCOUNTER — TELEPHONE (OUTPATIENT)
Dept: RHEUMATOLOGY | Facility: CLINIC | Age: 29
End: 2024-02-02
Payer: COMMERCIAL

## 2024-02-06 ENCOUNTER — LAB (OUTPATIENT)
Dept: LAB | Facility: CLINIC | Age: 29
End: 2024-02-06
Payer: COMMERCIAL

## 2024-02-06 DIAGNOSIS — M19.90 INFLAMMATORY ARTHRITIS: ICD-10-CM

## 2024-02-06 DIAGNOSIS — M35.2 BEHCET'S DISEASE (H): ICD-10-CM

## 2024-02-06 LAB
ALBUMIN MFR UR ELPH: 58.4 MG/DL
ALBUMIN SERPL BCG-MCNC: 4.9 G/DL (ref 3.5–5.2)
ALBUMIN UR-MCNC: 70 MG/DL
ALT SERPL W P-5'-P-CCNC: 14 U/L (ref 0–50)
APPEARANCE UR: CLEAR
AST SERPL W P-5'-P-CCNC: 16 U/L (ref 0–45)
BASOPHILS # BLD AUTO: 0 10E3/UL (ref 0–0.2)
BASOPHILS NFR BLD AUTO: 0 %
BILIRUB UR QL STRIP: NEGATIVE
COLOR UR AUTO: YELLOW
CREAT SERPL-MCNC: 0.78 MG/DL (ref 0.51–0.95)
CREAT UR-MCNC: 697 MG/DL
CRP SERPL-MCNC: <3 MG/L
EGFRCR SERPLBLD CKD-EPI 2021: >90 ML/MIN/1.73M2
EOSINOPHIL # BLD AUTO: 0.1 10E3/UL (ref 0–0.7)
EOSINOPHIL NFR BLD AUTO: 1 %
ERYTHROCYTE [DISTWIDTH] IN BLOOD BY AUTOMATED COUNT: 12 % (ref 10–15)
ERYTHROCYTE [SEDIMENTATION RATE] IN BLOOD BY WESTERGREN METHOD: 6 MM/HR (ref 0–20)
FERRITIN SERPL-MCNC: 35 NG/ML (ref 6–175)
GLUCOSE UR STRIP-MCNC: NEGATIVE MG/DL
HCT VFR BLD AUTO: 41.3 % (ref 35–47)
HGB BLD-MCNC: 13.8 G/DL (ref 11.7–15.7)
HGB UR QL STRIP: NEGATIVE
IMM GRANULOCYTES # BLD: 0 10E3/UL
IMM GRANULOCYTES NFR BLD: 0 %
IRON BINDING CAPACITY (ROCHE): 381 UG/DL (ref 240–430)
IRON SATN MFR SERPL: 24 % (ref 15–46)
IRON SERPL-MCNC: 92 UG/DL (ref 37–145)
KETONES UR STRIP-MCNC: ABNORMAL MG/DL
LEUKOCYTE ESTERASE UR QL STRIP: NEGATIVE
LYMPHOCYTES # BLD AUTO: 1.2 10E3/UL (ref 0.8–5.3)
LYMPHOCYTES NFR BLD AUTO: 27 %
MCH RBC QN AUTO: 30.1 PG (ref 26.5–33)
MCHC RBC AUTO-ENTMCNC: 33.4 G/DL (ref 31.5–36.5)
MCV RBC AUTO: 90 FL (ref 78–100)
MONOCYTES # BLD AUTO: 0.2 10E3/UL (ref 0–1.3)
MONOCYTES NFR BLD AUTO: 4 %
MUCOUS THREADS #/AREA URNS LPF: PRESENT /LPF
NEUTROPHILS # BLD AUTO: 3 10E3/UL (ref 1.6–8.3)
NEUTROPHILS NFR BLD AUTO: 68 %
NITRATE UR QL: NEGATIVE
NRBC # BLD AUTO: 0 10E3/UL
NRBC BLD AUTO-RTO: 0 /100
PH UR STRIP: 5.5 [PH] (ref 5–7)
PLATELET # BLD AUTO: 236 10E3/UL (ref 150–450)
PROT/CREAT 24H UR: 0.08 MG/MG CR (ref 0–0.2)
RBC # BLD AUTO: 4.59 10E6/UL (ref 3.8–5.2)
RBC URINE: 3 /HPF
RHEUMATOID FACT SERPL-ACNC: <10 IU/ML
SP GR UR STRIP: >1.03 (ref 1–1.03)
SQUAMOUS EPITHELIAL: 3 /HPF
UROBILINOGEN UR STRIP-MCNC: 3 MG/DL
VIT D+METAB SERPL-MCNC: 28 NG/ML (ref 20–50)
WBC # BLD AUTO: 4.5 10E3/UL (ref 4–11)
WBC URINE: 6 /HPF

## 2024-02-06 PROCEDURE — 86225 DNA ANTIBODY NATIVE: CPT

## 2024-02-06 PROCEDURE — 82306 VITAMIN D 25 HYDROXY: CPT

## 2024-02-06 PROCEDURE — 85025 COMPLETE CBC W/AUTO DIFF WBC: CPT

## 2024-02-06 PROCEDURE — 86037 ANCA TITER EACH ANTIBODY: CPT

## 2024-02-06 PROCEDURE — 83550 IRON BINDING TEST: CPT

## 2024-02-06 PROCEDURE — 36415 COLL VENOUS BLD VENIPUNCTURE: CPT

## 2024-02-06 PROCEDURE — 86140 C-REACTIVE PROTEIN: CPT

## 2024-02-06 PROCEDURE — 83516 IMMUNOASSAY NONANTIBODY: CPT

## 2024-02-06 PROCEDURE — 84156 ASSAY OF PROTEIN URINE: CPT

## 2024-02-06 PROCEDURE — 81001 URINALYSIS AUTO W/SCOPE: CPT

## 2024-02-06 PROCEDURE — 85652 RBC SED RATE AUTOMATED: CPT

## 2024-02-06 PROCEDURE — 86431 RHEUMATOID FACTOR QUANT: CPT

## 2024-02-06 PROCEDURE — 84450 TRANSFERASE (AST) (SGOT): CPT

## 2024-02-06 PROCEDURE — 82728 ASSAY OF FERRITIN: CPT

## 2024-02-06 PROCEDURE — 86160 COMPLEMENT ANTIGEN: CPT

## 2024-02-06 PROCEDURE — 82040 ASSAY OF SERUM ALBUMIN: CPT

## 2024-02-06 PROCEDURE — 84460 ALANINE AMINO (ALT) (SGPT): CPT

## 2024-02-06 PROCEDURE — 82565 ASSAY OF CREATININE: CPT

## 2024-02-06 PROCEDURE — 86200 CCP ANTIBODY: CPT

## 2024-02-06 PROCEDURE — 83876 ASSAY MYELOPEROXIDASE: CPT

## 2024-02-07 LAB
ANCA AB PATTERN SER IF-IMP: NORMAL
C-ANCA TITR SER IF: NORMAL {TITER}
C3 SERPL-MCNC: 129 MG/DL (ref 81–157)
C4 SERPL-MCNC: 25 MG/DL (ref 13–39)

## 2024-02-09 LAB
CCP AB SER IA-ACNC: 1.1 U/ML
DSDNA AB SER-ACNC: 6.4 IU/ML
MYELOPEROXIDASE AB SER IA-ACNC: 0.3 U/ML
MYELOPEROXIDASE AB SER IA-ACNC: NEGATIVE
PROTEINASE3 AB SER IA-ACNC: <1 U/ML
PROTEINASE3 AB SER IA-ACNC: NEGATIVE

## 2024-02-27 ENCOUNTER — OFFICE VISIT (OUTPATIENT)
Dept: PHYSICAL MEDICINE AND REHAB | Facility: CLINIC | Age: 29
End: 2024-02-27
Payer: COMMERCIAL

## 2024-02-27 VITALS — SYSTOLIC BLOOD PRESSURE: 104 MMHG | HEART RATE: 102 BPM | DIASTOLIC BLOOD PRESSURE: 71 MMHG

## 2024-02-27 DIAGNOSIS — M79.18 MYOFASCIAL PAIN: ICD-10-CM

## 2024-02-27 DIAGNOSIS — G43.719 CHRONIC MIGRAINE WITHOUT AURA, INTRACTABLE, WITHOUT STATUS MIGRAINOSUS: Primary | ICD-10-CM

## 2024-02-27 DIAGNOSIS — M54.81 BILATERAL OCCIPITAL NEURALGIA: ICD-10-CM

## 2024-02-27 PROCEDURE — 20553 NJX 1/MLT TRIGGER POINTS 3/>: CPT | Mod: XS | Performed by: PHYSICAL MEDICINE & REHABILITATION

## 2024-02-27 PROCEDURE — 64615 CHEMODENERV MUSC MIGRAINE: CPT | Performed by: PHYSICAL MEDICINE & REHABILITATION

## 2024-02-27 PROCEDURE — 95874 GUIDE NERV DESTR NEEDLE EMG: CPT | Performed by: PHYSICAL MEDICINE & REHABILITATION

## 2024-02-27 PROCEDURE — 64405 NJX AA&/STRD GR OCPL NRV: CPT | Mod: XS | Performed by: PHYSICAL MEDICINE & REHABILITATION

## 2024-02-27 RX ORDER — METHYLPREDNISOLONE SODIUM SUCCINATE 40 MG/ML
40 INJECTION, POWDER, LYOPHILIZED, FOR SOLUTION INTRAMUSCULAR; INTRAVENOUS ONCE
Status: COMPLETED | OUTPATIENT
Start: 2024-02-27 | End: 2024-02-28

## 2024-02-27 NOTE — LETTER
2/27/2024         RE: Samara Oropeza  [address]  [facility] [id number]        Dear Colleague,    Thank you for referring your patient, Samara Oropeza, to the [facility]. Please see a copy of my visit note below.      [facility]    PM&R CLINIC NOTE  BOTULINUM TOXIN PROCEDURE      HPI  No chief complaint on file.    Samara Oropeza is a 28 year old female who presents to clinic for botulinum toxin injections for management of chronic migraine headaches.     SINCE LAST VISIT  Samara Oropeza was last seen here in clinic on 12/5/2023, at which time she received 200 units of Botox.     She explains this time Botox took effect in about a month, and she was having more HA's during the time period. She does not endorse facial muscle movements prior to Botox taking effect. She does not have any new changes in her life. Raising her daughter mostly, who has not started sleeping through the night, giving her about 4 hrs of sleep at night. She does not add in daytime naps. She has an implant in her arm and has not had a period since the birth of her child.     Patient denies new medical diagnoses, illnesses, hospitalizations, emergency room visits, and injuries since the previous injection with botulinum neurotoxin.    RESPONSE TO PREVIOUS TREATMENT    Side effects: No problems reported    1.  Headache frequency during this injection cycle: The first month  was tough. At about 3 weeks pror to appointment has wearing oddd of effectiveness     2.  Headache duration during this injection cycle:  Headache duration was usually a few hours to a few days . Patient reports a few episodes of multiple day headaches during this injection cycle, particularly as the Botox was wearing off.     3.  Headache intensity during this injection cycle:    A.  7/10  =  Typical pain level.  B.  10/10  =  Worst pain level.    C.  2/10  =   Lowest pain level.    4.  Change in headache medication usage during this injection cycle:  (For Example:  Able to decrease use of oral pain medications.) She has been taking Tylenol and ibuprofen as needed for her migraine headaches. She will take rizatriptan if the OTC medications do not work. She has taken more rizatriptan this injection cycle due to more headaches.     5.  ER Visits During This Injection Cycle:  None.     6.  Functional Performance:  Change in ADL's, social interaction, days lost from work, etc. Patient reports being able to more fully participate in social and family activities and responsibilities as headache symptoms have improved      PHYSICAL EXAM  VS: There were no vitals taken for this visit.   GEN: Pleasant and cooperative, in no acute distress  HEENT: No facial asymmetry    ALLERGIES  Allergies   Allergen Reactions     Amoxil [Penicillins] Rash     Dad unsure of reaction.     Bee Venom Anaphylaxis     Bioflavonoids Anaphylaxis     Citrus Anaphylaxis     All [place]     Contrast Dye Rash     Contrast Media Ready-[facility], 04/09/2014.; Contrast Media [facility], 04/09/2014.  NOTE: this is a contrast media oral with iodine. Premedicate with methylpred standard for IV contrast, request barium contrast for oral contrast.     Iodinated Contrast Media Hives and Rash     Contrast Media [facility], 04/09/2014.; Contrast Media [facility], 04/09/2014.  NOTE: this is a contrast media oral with iodine. Premedicate with methylpred standard for IV contrast, request barium contrast for oral contrast.     Pineapple Anaphylaxis, Difficulty breathing and Rash     Reglan [Metoclopramide] Other (See Comments)     IV dose only, in ER, rapid heart rate.     Ace Inhibitors      Difficulty in breathing and GI upset     Amitiza [Lubiprostone] Nausea and Vomiting     Amoxicillin-Pot Clavulanate      Midazolam Unknown     parent states that when pt takes this medication, she wakes up being very violent  .     Midazolam Hcl      Coming out of pelvic exam at age of 5, was kicking and screaming when coming out of the versed.     Other [No Clinical Screening - See Comments]      Bleech/ chest tightness, itchy throat, swollen tongue, hives     Tizanidine Other (See Comments)     Confusion, back pain, photophobia, abdominal pain, shaking, anxious       Adhesive Tape Rash     Azithromycin Hives and Rash     Cephalexin Itching and Rash     Sulfa Antibiotics Rash     Skin scarring       CURRENT MEDICATIONS    Current Outpatient Medications:      albuterol (PROAIR HFA/PROVENTIL HFA/VENTOLIN HFA) 108 (90 Base) MCG/ACT inhaler, Inhale 2 puffs into the lungs every 6 hours as needed for shortness of breath / dyspnea or wheezing, Disp: 1 Inhaler, Rfl: 1     amitriptyline (ELAVIL) 25 MG tablet, TAKE 1 TABLET BY MOUTH EVERY NIGHT AT BEDTIME, Disp: 90 tablet, Rfl: 1     apremilast (OTEZLA) 30 MG tablet, Take 1 tablet (30 mg) by mouth 2 times daily Hold for signs of infection, and seek medical attention., Disp: 180 tablet, Rfl: 3     artificial tears OINT ophthalmic ointment, 0.5 inch strip each eye at night, Disp: 1 Tube, Rfl: 11     azelaic acid (FINACIA) 15 % external gel, Once daily to scars, upper chest and small portion of back and spot on belly button. Hold if irritating, Disp: 50 g, Rfl: 5     benzocaine (AMERICAINE) 20 % external aerosol, Apply to perineum four times daily as needed for pain, Disp: 57 g, Rfl: 3     benzocaine (TOPICALE XTRA) 20 % GEL, Apply as needed locally to mouth or nasal ulcers for pain; 4 times daily as needed, Disp: 30 g, Rfl: 1     betamethasone valerate (VALISONE) 0.1 % cream, Apply topically 2 times daily as needed , Disp: , Rfl:      bisacodyl (DULCOLAX) 5 MG EC tablet, Take 2 tablets (10 mg) by mouth daily as needed for constipation, Disp: 30 tablet, Rfl: 0     citalopram (CELEXA) 20 MG tablet, Take 1 tablet (20 mg) by mouth daily, Disp: 90 tablet, Rfl: 1     EPINEPHrine (EPIPEN 2-EAMON) 0.3  MG/0.3ML injection, Inject 0.3 mLs (0.3 mg) into the muscle as needed for anaphylaxis, Disp: 0.6 mL, Rfl: 3     etonogestrel (NEXPLANON) 68 MG IMPL, Inject 68 mg Subcutaneous, Disp: , Rfl:      fluocinonide (LIDEX) 0.05 % external gel, Apply topically 2 times daily, Disp: 60 g, Rfl: 11     gabapentin (NEURONTIN) 300 MG capsule, Take 1 capsule (300 mg) by mouth every morning, Disp: 60 capsule, Rfl: 1     hydrocortisone, Perianal, (ANUSOL-HC) 2.5 % cream, Place rectally 3 times daily as needed for hemorrhoids, Disp: 30 g, Rfl: 0     hydroxychloroquine (PLAQUENIL) 200 MG tablet, Take 1 tablet (200 mg) by mouth 2 times daily (with meals) Annual Plaquenil toxicity eye screening required., Disp: 60 tablet, Rfl: 11     hydrOXYzine HCl (ATARAX) 25 MG tablet, TAKE ONE OR TWO TABLETS BY MOUTH EVERY SIX HOURS AS NEEDED, Disp: , Rfl:      hypromellose (GENTEAL) 0.3 % SOLN, 1 drop every hour as needed for dry eyes , Disp: , Rfl:      lactulose 20 GM/30ML SOLN, Take 30 mLs by mouth 3 times daily as needed (for constipation), Disp: 300 mL, Rfl: 3     lanolin ointment, Apply topically every hour as needed for other (sore nipples), Disp: 7 g, Rfl: 3     lidocaine (LMX4) 4 % external cream, Apply topically once as needed for mild pain, Disp: 120 g, Rfl: 1     LINZESS 290 MCG capsule, TAKE 1 CAPSULE BY MOUTH EVERY DAY IN THE MORNING BEFORE BREAKFAST, Disp: 90 capsule, Rfl: 0     mometasone (ELOCON) 0.1 % external ointment, Apply topically 2 times daily, Disp: 45 g, Rfl: 11     ondansetron (ZOFRAN ODT) 8 MG ODT tab, Take 1 tablet by mouth every 8 hours as needed, Disp: , Rfl:      polyethylene glycol (MIRALAX/GLYCOLAX) powder, Take 1 capful by mouth 3 times daily, Disp: , Rfl:      Prenatal MV-Min-Fe Fum-FA-DHA (PRENATAL 1 PO), , Disp: , Rfl:      rizatriptan (MAXALT-MLT) 5 MG ODT, DISSOLVE 1 OR 2 TABLETS IN THE MOUTH AS NEEDED FOR HEADACHE AT ONSET OF MIGRAINE, MAY REPEAT IN 2 HOURS AS NEEDED. MAX 30MG IN 24 HOURS AND MAX 5 DAYS PER  MONTH, Disp: , Rfl:      sodium fluoride 1.1 % CREA, Apply 1 Application topically daily, Disp: , Rfl:      sucralfate (CARAFATE) 1 GM/10ML suspension, Take 10 mLs (1 g) by mouth 4 times daily (Patient not taking: Reported on 2024), Disp: 1200 mL, Rfl: 2     triamcinolone (KENALOG) 0.1 % external ointment, Apply twice daily as needed to lesions on the genitals and body. OK to use very sparingly on the face., Disp: 454 g, Rfl: 2    Current Facility-Administered Medications:      botulinum toxin type A (BOTOX) 100 units injection 200 Units, 200 Units, Intramuscular, Q90 Days, Alejandrina Hernandez MD, 200 Units at 23 1416     triamcinolone (KENALOG-40) injection 40 mg, 40 mg, , , Fleischmann, Andres, DPM, 40 mg at 10/11/21 0928     triamcinolone (KENALOG-40) injection 40 mg, 40 mg, , , Fleischmann, Andres, DPM, 40 mg at 06/15/21 0957     triamcinolone (KENALOG-40) injection 40 mg, 40 mg, , , Fleischmann, Andres, DPM, 40 mg at 09/15/20 1554       BOTULINUM NEUROTOXIN INJECTION PROCEDURES    VERIFICATION OF PATIENT IDENTIFICATION AND PROCEDURE     Initials   Patient Name SES   Patient  SES   Procedure Verified by: SES     Prior to the start of the procedure and with procedural staff participation, I verbally confirmed the patient s identity using two indicators, relevant allergies, that the procedure was appropriate and matched the consent or emergent situation, and that the correct equipment/implants were available. Immediately prior to starting the procedure I conducted the Time Out with the procedural staff and re-confirmed the patient s name, procedure, and site/side. (The Joint Commission universal protocol was followed.)  Yes    Sedation (Moderate or Deep): None    ABOVE ASSESSMENTS PERFORMED BY    MD Kae Hinkle MD (PM&R resident)          The attending provider was present for the entire procedure documented below.       INDICATIONS FOR PROCEDURES  Samara Oropeza is  a 28 year old patient with pain secondary to the diagnosis of chronic migraine headaches. Her baseline symptoms have been recalcitrant to oral medications and conservative therapy.  She is here today for reinjection with Botox.    GOAL OF PROCEDURE  The goal of this procedure is to decrease pain.      TOTAL DOSE ADMINISTERED  Dose Administered:  200 units  Botox (Botulinum Toxin Type A)       2:1 Dilution   Unavoidable Drug Waste: No.  Diluent Used:  Preservative Free Normal Saline  Total Volume of Diluent Used:  4 ml  NDC #: Botox 100u ([address]-23)      CONSENT  The risks, benefits, and treatment options were discussed with Samara Oropeza and she agreed to proceed.    Written consent was obtained by  [facility] .     EQUIPMENT USED  Needle-25mm stimulating/recording  Needle-30 gauge  EMG/NCS Machine    SKIN PREPARATION  Skin preparation was performed using an alcohol wipe.    GUIDANCE DESCRIPTION  Electro-myographic guidance was necessary throughout the neck portion of the procedure to accurately identify all areas of spastic muscles while avoiding injection of non-spastic muscles, neighboring nerves and nearby vascular structures.       AREA/MUSCLE INJECTED:  200 UNITS BOTOX = TOTAL DOSE, 2:1 DILUTION     1. SHOULDER GIRDLE & NECK MUSCLES: 60 UNITS BOTOX = TOTAL DOSE     Right Splenius Cervicis - 5 units of Botox over 2 site/s.   Left Splenius Cervicis - 5 units of Botox over 2 site/s.     Right Rectus Capitis - 2.5 units of Botox at 1 site.  Left Rectus Capitis - 2.5 units of Botox at 1site.     Right Levator Scapulae - 10 units of Botox over 2 site/s.   Left Levator Scapulae - 10 units of Botox over 2 site/s.    Right Anterior Scalene - 2.5 units of Botox over 1 site/s.   Left Anterior Scalene - 2.5 units of Botox over 1 site/s.     Right Sternocleidomastoid - 2.5 units of Botox over 1 site/s.   Left Sternocleidomastoid - 2.5 units of Botox over 1 site/s.     Right Rhomboid Major - 5 units of Botox over 1  site/s.    Left Rhomboid Major - 5 units of Botox over 1 site/s.      Right Pectoralis Minor - 2.5 units of Botox over 1 site/s.    Left Pectoralis Minor - 2.5 units of Botox over 1 site/s.     2. FACIAL & SCALP MUSCLES: 140 UNITS BOTOX = TOTAL DOSE     Right Occipitalis - 5 units of Botox over 1 site/s.   Left Occipitalis - 5 units of Botox over 1 site/s.     Right Frontalis - 10 units of Botox over 2 site/s.  Left Frontalis - 10 units of Botox over 2 site/s.     Right Temporalis - 50 units of Botox over 8 site/s.  Left Temporalis - 50 units of Botox over 8 site/s.     Right  - 2.5 units of Botox over 1 site/s.              Left  - 2.5 units of Botox over 1 site/s.                 Procerus - 5 units of Botox at 1 site/s.      BILATERAL GREATER & LESSER OCCIPITAL NERVE BLOCKS, TRIGGER POINT INJECTIONS  1% lidocaine: 10 ml  Methylprednisolone: 40 mg = 1 ml     ONB: Area just inferior to insertion of the right and left superior trapezius insertion onto skull was cleansed with ChloraPrep. Needle was advanced anteriorly to base of skull then slightly withdrawn and injectate was injected in a fan-like distribution at different depths. An 8 ml mixture of 1% lidocaine, and methylprednisolone was divided into two 5 ml syringes. Total injection volume = 4 ml per side.     TPI: Areas of the skin were prepped with ChloraPrep.  Using standard precautions, a 30-gauge 1-inch needle was used to inject a 3 ml mixture of the above medications into the upper trapezius and rhomboid major/minor muscles bilaterally for a total of 8 sites.         RESPONSE TO PROCEDURE  Samara Oropeza tolerated the procedure well and there were no immediate complications. She was allowed to recover for an appropriate period of time and was discharged home in stable condition.    ASSESSMENT AND PLAN   Botulinum toxin injections: No changes made to Botox dose or distribution today. Occipital nerve blocks and trigger point  injections also administered per patient request to address occipital neuralgia and myofascial pain. Patient will continue to monitor response to Botox and report at next appointment.   Referrals: None.   Follow up: Samara Oropeza was rescheduled for the next series of injections in 12 weeks, at which time we will evaluate response to today's injections. she may call the clinic prior with any questions or concerns prior to the next appointment.     I, Alejandrina Hernandez MD, saw this patient with resident, Dr. Smith, and agree with the findings and plan of care as documented in this note. I personally reviewed the chart (vitals signs, medications, labs and imaging). My key findings and key management decisions made are reflected in this note.  I was present for the entire procedure listed above.      Alejandrina Hernandez MD         Again, thank you for allowing me to participate in the care of your patient.        Sincerely,        Alejandrina Hernnadez MD

## 2024-02-28 RX ADMIN — METHYLPREDNISOLONE SODIUM SUCCINATE 40 MG: 40 INJECTION, POWDER, LYOPHILIZED, FOR SOLUTION INTRAMUSCULAR; INTRAVENOUS at 10:25

## 2024-03-04 DIAGNOSIS — E55.9 VITAMIN D DEFICIENCY: Primary | ICD-10-CM

## 2024-03-04 RX ORDER — ERGOCALCIFEROL 1.25 MG/1
50000 CAPSULE, LIQUID FILLED ORAL
Qty: 12 CAPSULE | Refills: 0 | Status: SHIPPED | OUTPATIENT
Start: 2024-03-04 | End: 2024-09-18

## 2024-04-04 ENCOUNTER — TELEPHONE (OUTPATIENT)
Dept: RHEUMATOLOGY | Facility: CLINIC | Age: 29
End: 2024-04-04
Payer: COMMERCIAL

## 2024-04-04 DIAGNOSIS — G43.719 CHRONIC MIGRAINE WITHOUT AURA, INTRACTABLE, WITHOUT STATUS MIGRAINOSUS: Primary | ICD-10-CM

## 2024-04-04 NOTE — TELEPHONE ENCOUNTER
PA Initiation    Medication: OTEZLA 30 MG PO TABS  Insurance Company: [employer] - Phone [phone] Fax [phone]  Pharmacy Filling the Rx: [facility] - [place], [place] - [facility] [address]  Filling Pharmacy Phone:    Filling Pharmacy Fax:    Start Date: 4/4/2024    [id number]

## 2024-04-04 NOTE — TELEPHONE ENCOUNTER
Prior Authorization Approval    Medication: OTEZLA 30 MG PO TABS  Authorization Effective Date: 4/4/2024  Authorization Expiration Date: 4/4/2025  Approved Dose/Quantity: bid  Reference #:     Insurance Company: [employer] - Phone [phone] Fax [phone]  Expected CoPay: $    CoPay Card Available: No    Financial Assistance Needed: no  Which Pharmacy is filling the prescription: [facility] - [place] [address]  Pharmacy Notified: yes  Patient Notified: yes

## 2024-05-21 ENCOUNTER — OFFICE VISIT (OUTPATIENT)
Dept: PHYSICAL MEDICINE AND REHAB | Facility: CLINIC | Age: 29
End: 2024-05-21
Payer: COMMERCIAL

## 2024-05-21 VITALS — SYSTOLIC BLOOD PRESSURE: 113 MMHG | DIASTOLIC BLOOD PRESSURE: 73 MMHG | HEART RATE: 110 BPM

## 2024-05-21 DIAGNOSIS — M79.18 MYOFASCIAL PAIN: ICD-10-CM

## 2024-05-21 DIAGNOSIS — M54.81 BILATERAL OCCIPITAL NEURALGIA: Primary | ICD-10-CM

## 2024-05-21 DIAGNOSIS — G43.719 CHRONIC MIGRAINE WITHOUT AURA, INTRACTABLE, WITHOUT STATUS MIGRAINOSUS: ICD-10-CM

## 2024-05-21 PROCEDURE — 64615 CHEMODENERV MUSC MIGRAINE: CPT | Performed by: PHYSICAL MEDICINE & REHABILITATION

## 2024-05-21 PROCEDURE — 64405 NJX AA&/STRD GR OCPL NRV: CPT | Mod: XS | Performed by: PHYSICAL MEDICINE & REHABILITATION

## 2024-05-21 PROCEDURE — 95874 GUIDE NERV DESTR NEEDLE EMG: CPT | Performed by: PHYSICAL MEDICINE & REHABILITATION

## 2024-05-21 PROCEDURE — 20552 NJX 1/MLT TRIGGER POINT 1/2: CPT | Mod: XS | Performed by: PHYSICAL MEDICINE & REHABILITATION

## 2024-05-21 NOTE — LETTER
5/21/2024      Samara Oropeza  [address]  [facility] [place] [address]      Dear Colleague,    Thank you for referring your patient, Samara Oropeza, to the [facility]. Please see a copy of my visit note below.      [facility]    PM&R CLINIC NOTE  BOTULINUM TOXIN PROCEDURE      HPI  Chief Complaint   Patient presents with     Procedure     Botox and ONB     Samara Oropeza is a 28 year old female who presents to clinic for botulinum toxin injections for management of chronic migraine headaches.     SINCE LAST VISIT  Samara Oropeza was last seen here in clinic on 12/5/2023, at which time she received 200 units of Botox.     Patient reports the following new medical problems since last visit: She underwent a L4-5 discectomy and decompression on 4/30/2024. Surgery went well without complications. She still has ongoing neuropathic pain down her legs.     RESPONSE TO PREVIOUS TREATMENT    Side effects: No problems reported    1.  Headache frequency during this injection cycle: She reports approximately 8 milder headache days per month, with one full month with no migraine headaches. This is compared to her baseline headache frequency of 30 headache days per month.     2.  Headache duration during this injection cycle:  Headache duration was usually a few hours to a few days . Patient reports a few episodes of multiple day headaches during this injection cycle, particularly as the Botox was wearing off.     3.  Headache intensity during this injection cycle:    A.  7/10  =  Typical pain level.  B.  10/10  =  Worst pain level.    C.  2/10  =  Lowest pain level.    4.  Change in headache medication usage during this injection cycle:  (For Example:  Able to decrease use of oral pain medications.) She has been taking Tylenol and ibuprofen as needed for her migraine headaches. She will take rizatriptan if the OTC medications do  not work. She has taken more rizatriptan this injection cycle due to more headaches.     5.  ER Visits During This Injection Cycle:  None.     6.  Functional Performance:  Change in ADL's, social interaction, days lost from work, etc. Patient reports being able to more fully participate in social and family activities and responsibilities as headache symptoms have improved      PHYSICAL EXAM  VS: /73 (BP Location: Right arm, Patient Position: Sitting, Cuff Size: Adult Regular)   Pulse 110    GEN: Pleasant and cooperative, in no acute distress  HEENT: No facial asymmetry    ALLERGIES  Allergies   Allergen Reactions     Amoxil [Penicillins] Rash     Dad unsure of reaction.     Bee Venom Anaphylaxis     Bioflavonoids Anaphylaxis     Citrus Anaphylaxis     All [place]     Contrast Dye Rash     [place] [place] [facility] [place].  NOTE: this is a contrast media oral with iodine. Premedicate with methylpred standard for IV contrast, request barium contrast for oral contrast.     Iodinated Contrast Media Hives and Rash     [facility]rast [facility] [place] Contrast [facility] [place].  NOTE: this is a contrast media oral with iodine. Premedicate with methylpred standard for IV contrast, request barium contrast for oral contrast.     Pineapple Anaphylaxis, Difficulty breathing and Rash     Reglan [Metoclopramide] Other (See Comments)     IV dose only, in ER, rapid heart rate.     Ace Inhibitors      Difficulty in breathing and GI upset     Amitiza [Lubiprostone] Nausea and Vomiting     Amoxicillin-Pot Clavulanate      Midazolam Unknown     parent states that when pt takes this medication, she wakes up being very violent .     Midazolam Hcl      Coming out of pelvic exam at age of 5, was kicking and screaming when coming out of the versed.     Other [No Clinical Screening - See Comments]      Bleech/ chest tightness, itchy throat, swollen  tongue, hives     Tizanidine Other (See Comments)     Confusion, back pain, photophobia, abdominal pain, shaking, anxious       Adhesive Tape Rash     Azithromycin Hives and Rash     Cephalexin Itching and Rash     Sulfa Antibiotics Rash     Skin scarring       CURRENT MEDICATIONS    Current Outpatient Medications:      albuterol (PROAIR HFA/PROVENTIL HFA/VENTOLIN HFA) 108 (90 Base) MCG/ACT inhaler, Inhale 2 puffs into the lungs every 6 hours as needed for shortness of breath / dyspnea or wheezing, Disp: 1 Inhaler, Rfl: 1     amitriptyline (ELAVIL) 25 MG tablet, TAKE 1 TABLET BY MOUTH EVERY NIGHT AT BEDTIME, Disp: 90 tablet, Rfl: 1     apremilast (OTEZLA) 30 MG tablet, Take 1 tablet (30 mg) by mouth 2 times daily Hold for signs of infection, and seek medical attention., Disp: 180 tablet, Rfl: 3     artificial tears OINT ophthalmic ointment, 0.5 inch strip each eye at night, Disp: 1 Tube, Rfl: 11     azelaic acid (FINACIA) 15 % external gel, Once daily to scars, upper chest and small portion of back and spot on belly button. Hold if irritating, Disp: 50 g, Rfl: 5     benzocaine (AMERICAINE) 20 % external aerosol, Apply to perineum four times daily as needed for pain, Disp: 57 g, Rfl: 3     benzocaine (TOPICALE XTRA) 20 % GEL, Apply as needed locally to mouth or nasal ulcers for pain; 4 times daily as needed, Disp: 30 g, Rfl: 1     betamethasone valerate (VALISONE) 0.1 % cream, Apply topically 2 times daily as needed , Disp: , Rfl:      bisacodyl (DULCOLAX) 5 MG EC tablet, Take 2 tablets (10 mg) by mouth daily as needed for constipation, Disp: 30 tablet, Rfl: 0     citalopram (CELEXA) 20 MG tablet, Take 1 tablet (20 mg) by mouth daily, Disp: 90 tablet, Rfl: 1     EPINEPHrine (EPIPEN 2-EAMON) 0.3 MG/0.3ML injection, Inject 0.3 mLs (0.3 mg) into the muscle as needed for anaphylaxis, Disp: 0.6 mL, Rfl: 3     etonogestrel (NEXPLANON) 68 MG IMPL, Inject 68 mg Subcutaneous, Disp: , Rfl:      fluocinonide (LIDEX) 0.05 %  external gel, Apply topically 2 times daily, Disp: 60 g, Rfl: 11     gabapentin (NEURONTIN) 300 MG capsule, Take 1 capsule (300 mg) by mouth every morning, Disp: 60 capsule, Rfl: 1     hydrocortisone, Perianal, (ANUSOL-HC) 2.5 % cream, Place rectally 3 times daily as needed for hemorrhoids, Disp: 30 g, Rfl: 0     hydroxychloroquine (PLAQUENIL) 200 MG tablet, Take 1 tablet (200 mg) by mouth 2 times daily (with meals) Annual Plaquenil toxicity eye screening required., Disp: 60 tablet, Rfl: 11     hydrOXYzine HCl (ATARAX) 25 MG tablet, TAKE ONE OR TWO TABLETS BY MOUTH EVERY SIX HOURS AS NEEDED, Disp: , Rfl:      hypromellose (GENTEAL) 0.3 % SOLN, 1 drop every hour as needed for dry eyes , Disp: , Rfl:      lanolin ointment, Apply topically every hour as needed for other (sore nipples), Disp: 7 g, Rfl: 3     lidocaine (LMX4) 4 % external cream, Apply topically once as needed for mild pain, Disp: 120 g, Rfl: 1     LINZESS 290 MCG capsule, TAKE 1 CAPSULE BY MOUTH EVERY DAY IN THE MORNING BEFORE BREAKFAST, Disp: 90 capsule, Rfl: 0     mometasone (ELOCON) 0.1 % external ointment, Apply topically 2 times daily, Disp: 45 g, Rfl: 11     ondansetron (ZOFRAN ODT) 8 MG ODT tab, Take 1 tablet by mouth every 8 hours as needed, Disp: , Rfl:      polyethylene glycol (MIRALAX/GLYCOLAX) powder, Take 1 capful by mouth 3 times daily, Disp: , Rfl:      Prenatal MV-Min-Fe Fum-FA-DHA (PRENATAL 1 PO), , Disp: , Rfl:      rizatriptan (MAXALT-MLT) 5 MG ODT, DISSOLVE 1 OR 2 TABLETS IN THE MOUTH AS NEEDED FOR HEADACHE AT ONSET OF MIGRAINE, MAY REPEAT IN 2 HOURS AS NEEDED. MAX 30MG IN 24 HOURS AND MAX 5 DAYS PER MONTH, Disp: , Rfl:      sodium fluoride 1.1 % CREA, Apply 1 Application topically daily, Disp: , Rfl:      triamcinolone (KENALOG) 0.1 % external ointment, Apply twice daily as needed to lesions on the genitals and body. OK to use very sparingly on the face., Disp: 454 g, Rfl: 2     vitamin D2 (ERGOCALCIFEROL) 57560 units (1250 mcg)  capsule, Take 1 capsule (50,000 Units) by mouth every 7 days, Disp: 12 capsule, Rfl: 0     lactulose 20 GM/30ML SOLN, Take 30 mLs by mouth 3 times daily as needed (for constipation) (Patient not taking: Reported on 2024), Disp: 300 mL, Rfl: 3     sucralfate (CARAFATE) 1 GM/10ML suspension, Take 10 mLs (1 g) by mouth 4 times daily (Patient not taking: Reported on 2024), Disp: 1200 mL, Rfl: 2    Current Facility-Administered Medications:      botulinum toxin type A (BOTOX) 100 units injection 200 Units, 200 Units, Intramuscular, Q90 Days, Alejandrina Hernandez MD     botulinum toxin type A (BOTOX) 100 units injection 200 Units, 200 Units, Intramuscular, Q90 Days, Alejandrina Hernandez MD, 200 Units at 24 1433     triamcinolone (KENALOG-40) injection 40 mg, 40 mg, , , Fleischmann, Andres, DPM, 40 mg at 10/11/21 0928     triamcinolone (KENALOG-40) injection 40 mg, 40 mg, , , Fleischmann, Andres, DPM, 40 mg at 06/15/21 0957     triamcinolone (KENALOG-40) injection 40 mg, 40 mg, , , Fleischmann, Andres, DPM, 40 mg at 09/15/20 1554       BOTULINUM NEUROTOXIN INJECTION PROCEDURES    VERIFICATION OF PATIENT IDENTIFICATION AND PROCEDURE     Initials   Patient Name SES   Patient  SES   Procedure Verified by: SES     Prior to the start of the procedure and with procedural staff participation, I verbally confirmed the patient s identity using two indicators, relevant allergies, that the procedure was appropriate and matched the consent or emergent situation, and that the correct equipment/implants were available. Immediately prior to starting the procedure I conducted the Time Out with the procedural staff and re-confirmed the patient s name, procedure, and site/side. (The Joint Commission universal protocol was followed.)  Yes    Sedation (Moderate or Deep): None    ABOVE ASSESSMENTS PERFORMED BY    Alejandrina Hernandez MD    INDICATIONS FOR PROCEDURES  Samara Oropeza is a 28 year old patient with  pain secondary to the diagnosis of chronic migraine headaches. Her baseline symptoms have been recalcitrant to oral medications and conservative therapy.  She is here today for reinjection with Botox.    GOAL OF PROCEDURE  The goal of this procedure is to decrease pain.      TOTAL DOSE ADMINISTERED  Dose Administered:  200 units  Botox (Botulinum Toxin Type A)       2:1 Dilution   Unavoidable Drug Waste: No.  Diluent Used:  Preservative Free Normal Saline  Total Volume of Diluent Used:  4 ml  NDC #: Botox 100u ([address]-25)      CONSENT  The risks, benefits, and treatment options were discussed with Samara Oropeza and she agreed to proceed.    Written consent was obtained by  [facility] .     EQUIPMENT USED  Needle-25mm stimulating/recording  Needle-30 gauge  EMG/NCS Machine    SKIN PREPARATION  Skin preparation was performed using an alcohol wipe.    GUIDANCE DESCRIPTION  Electro-myographic guidance was necessary throughout the neck portion of the procedure to accurately identify all areas of spastic muscles while avoiding injection of non-spastic muscles, neighboring nerves and nearby vascular structures.       AREA/MUSCLE INJECTED:  200 UNITS BOTOX = TOTAL DOSE, 2:1 DILUTION     1. SHOULDER GIRDLE & NECK MUSCLES: 60 UNITS BOTOX = TOTAL DOSE     Right Splenius Cervicis - 5 units of Botox over 2 site/s.   Left Splenius Cervicis - 5 units of Botox over 2 site/s.     Right Rectus Capitis - 2.5 units of Botox at 1 site.  Left Rectus Capitis - 2.5 units of Botox at 1site.     Right Levator Scapulae - 10 units of Botox over 2 site/s.   Left Levator Scapulae - 10 units of Botox over 2 site/s.    Right Anterior Scalene - 2.5 units of Botox over 1 site/s.   Left Anterior Scalene - 2.5 units of Botox over 1 site/s.     Right Sternocleidomastoid - 2.5 units of Botox over 1 site/s.   Left Sternocleidomastoid - 2.5 units of Botox over 1 site/s.     Right Rhomboid Major - 5 units of Botox over 1 site/s.    Left Rhomboid Major -  5 units of Botox over 1 site/s.      Right Pectoralis Minor - 2.5 units of Botox over 1 site/s.    Left Pectoralis Minor - 2.5 units of Botox over 1 site/s.     2. FACIAL & SCALP MUSCLES: 140 UNITS BOTOX = TOTAL DOSE     Right Occipitalis - 5 units of Botox over 1 site/s.   Left Occipitalis - 5 units of Botox over 1 site/s.     Right Frontalis - 10 units of Botox over 2 site/s.  Left Frontalis - 10 units of Botox over 2 site/s.     Right Temporalis - 50 units of Botox over 8 site/s.  Left Temporalis - 50 units of Botox over 8 site/s.     Right  - 2.5 units of Botox over 1 site/s.              Left  - 2.5 units of Botox over 1 site/s.                 Procerus - 5 units of Botox at 1 site/s.      BILATERAL GREATER & LESSER OCCIPITAL NERVE BLOCKS, TRIGGER POINT INJECTIONS  1% lidocaine: 2 ml  0.5% bupivacaine: 10 ml  [place] ml = 40 mg      ONB: Area just inferior to insertion of the right and left superior trapezius insertion onto skull was cleansed with ChloraPrep. Needle was advanced anteriorly to base of skull then slightly withdrawn and injectate was injected in a fan-like distribution at different depths. An 8 ml mixture of 1% lidocaine, and methylprednisolone was divided into two 5 ml syringes. Total injection volume = 4 ml per side.     TPI: Areas of the skin were prepped with ChloraPrep.  Using standard precautions, a 30-gauge 1-inch needle was used to inject a 3 ml mixture of 1% lidocaine and 0.5% bupivacaine into the upper trapezius and rhomboid major/minor muscles bilaterally for a total of 8 sites.         RESPONSE TO PROCEDURE  Samara Oropeza tolerated the procedure well and there were no immediate complications. She was allowed to recover for an appropriate period of time and was discharged home in stable condition.    ASSESSMENT AND PLAN   Botulinum toxin injections: No changes made to Botox dose or distribution today. Occipital nerve blocks and trigger point injections also  administered per patient request to address occipital neuralgia and myofascial pain. Patient will continue to monitor response to Botox and report at next appointment.   Referrals: None.   Follow up: Samara Oropeza was rescheduled for the next series of injections in 12 weeks, at which time we will evaluate response to today's injections. she may call the clinic prior with any questions or concerns prior to the next appointment.           Again, thank you for allowing me to participate in the care of your patient.        Sincerely,        Alejandrina Hernandez MD

## 2024-05-21 NOTE — PROGRESS NOTES
[facility]    PM&R CLINIC NOTE  BOTULINUM TOXIN PROCEDURE      HPI  Chief Complaint   Patient presents with    Procedure     Botox and ONB     aSmara Oropeza is a 28 year old female who presents to clinic for botulinum toxin injections for management of chronic migraine headaches.     SINCE LAST VISIT  Samara Oropeza was last seen here in clinic on 12/5/2023, at which time she received 200 units of Botox.     Patient reports the following new medical problems since last visit: She underwent a L4-5 discectomy and decompression on 4/30/2024. Surgery went well without complications. She still has ongoing neuropathic pain down her legs.     RESPONSE TO PREVIOUS TREATMENT    Side effects: No problems reported    1.  Headache frequency during this injection cycle: She reports approximately 8 milder headache days per month, with one full month with no migraine headaches. This is compared to her baseline headache frequency of 30 headache days per month.     2.  Headache duration during this injection cycle:  Headache duration was usually a few hours to a few days . Patient reports a few episodes of multiple day headaches during this injection cycle, particularly as the Botox was wearing off.     3.  Headache intensity during this injection cycle:    A.  7/10  =  Typical pain level.  B.  10/10  =  Worst pain level.    C.  2/10  =  Lowest pain level.    4.  Change in headache medication usage during this injection cycle:  (For Example:  Able to decrease use of oral pain medications.) She has been taking Tylenol and ibuprofen as needed for her migraine headaches. She will take rizatriptan if the OTC medications do not work. She has taken more rizatriptan this injection cycle due to more headaches.     5.  ER Visits During This Injection Cycle:  None.     6.  Functional Performance:  Change in ADL's, social interaction, days lost from work, etc. Patient reports being able to more fully  participate in social and family activities and responsibilities as headache symptoms have improved      PHYSICAL EXAM  VS: /73 (BP Location: Right arm, Patient Position: Sitting, Cuff Size: Adult Regular)   Pulse 110    GEN: Pleasant and cooperative, in no acute distress  HEENT: No facial asymmetry    ALLERGIES  Allergies   Allergen Reactions    Amoxil [Penicillins] Rash     Dad unsure of reaction.    Bee Venom Anaphylaxis    Bioflavonoids Anaphylaxis    Citrus Anaphylaxis     All [place]    Contrast Dye Rash     [place] [facility] [place]rast Media [place] [facility] [place]  NOTE: this is a contrast media oral with iodine. Premedicate with methylpred standard for IV contrast, request barium contrast for oral contrast.    Iodinated Contrast Media Hives and Rash     Contrast Media [place] [facility] [place] Contrast Media [place] [facility] [place]  NOTE: this is a contrast media oral with iodine. Premedicate with methylpred standard for IV contrast, request barium contrast for oral contrast.    Pineapple Anaphylaxis, Difficulty breathing and Rash    Reglan [Metoclopramide] Other (See Comments)     IV dose only, in ER, rapid heart rate.    Ace Inhibitors      Difficulty in breathing and GI upset    Amitiza [Lubiprostone] Nausea and Vomiting    Amoxicillin-Pot Clavulanate     Midazolam Unknown     parent states that when pt takes this medication, she wakes up being very violent .    Midazolam Hcl      Coming out of pelvic exam at age of 5, was kicking and screaming when coming out of the versed.    Other [No Clinical Screening - See Comments]      Bleech/ chest tightness, itchy throat, swollen tongue, hives    Tizanidine Other (See Comments)     Confusion, back pain, photophobia, abdominal pain, shaking, anxious      Adhesive Tape Rash    Azithromycin Hives and Rash    Cephalexin Itching and Rash    Sulfa Antibiotics Rash     Skin scarring       CURRENT MEDICATIONS    Current  Outpatient Medications:     albuterol (PROAIR HFA/PROVENTIL HFA/VENTOLIN HFA) 108 (90 Base) MCG/ACT inhaler, Inhale 2 puffs into the lungs every 6 hours as needed for shortness of breath / dyspnea or wheezing, Disp: 1 Inhaler, Rfl: 1    amitriptyline (ELAVIL) 25 MG tablet, TAKE 1 TABLET BY MOUTH EVERY NIGHT AT BEDTIME, Disp: 90 tablet, Rfl: 1    apremilast (OTEZLA) 30 MG tablet, Take 1 tablet (30 mg) by mouth 2 times daily Hold for signs of infection, and seek medical attention., Disp: 180 tablet, Rfl: 3    artificial tears OINT ophthalmic ointment, 0.5 inch strip each eye at night, Disp: 1 Tube, Rfl: 11    azelaic acid (FINACIA) 15 % external gel, Once daily to scars, upper chest and small portion of back and spot on belly button. Hold if irritating, Disp: 50 g, Rfl: 5    benzocaine (AMERICAINE) 20 % external aerosol, Apply to perineum four times daily as needed for pain, Disp: 57 g, Rfl: 3    benzocaine (TOPICALE XTRA) 20 % GEL, Apply as needed locally to mouth or nasal ulcers for pain; 4 times daily as needed, Disp: 30 g, Rfl: 1    betamethasone valerate (VALISONE) 0.1 % cream, Apply topically 2 times daily as needed , Disp: , Rfl:     bisacodyl (DULCOLAX) 5 MG EC tablet, Take 2 tablets (10 mg) by mouth daily as needed for constipation, Disp: 30 tablet, Rfl: 0    citalopram (CELEXA) 20 MG tablet, Take 1 tablet (20 mg) by mouth daily, Disp: 90 tablet, Rfl: 1    EPINEPHrine (EPIPEN 2-EAMON) 0.3 MG/0.3ML injection, Inject 0.3 mLs (0.3 mg) into the muscle as needed for anaphylaxis, Disp: 0.6 mL, Rfl: 3    etonogestrel (NEXPLANON) 68 MG IMPL, Inject 68 mg Subcutaneous, Disp: , Rfl:     fluocinonide (LIDEX) 0.05 % external gel, Apply topically 2 times daily, Disp: 60 g, Rfl: 11    gabapentin (NEURONTIN) 300 MG capsule, Take 1 capsule (300 mg) by mouth every morning, Disp: 60 capsule, Rfl: 1    hydrocortisone, Perianal, (ANUSOL-HC) 2.5 % cream, Place rectally 3 times daily as needed for hemorrhoids, Disp: 30 g, Rfl: 0     hydroxychloroquine (PLAQUENIL) 200 MG tablet, Take 1 tablet (200 mg) by mouth 2 times daily (with meals) Annual Plaquenil toxicity eye screening required., Disp: 60 tablet, Rfl: 11    hydrOXYzine HCl (ATARAX) 25 MG tablet, TAKE ONE OR TWO TABLETS BY MOUTH EVERY SIX HOURS AS NEEDED, Disp: , Rfl:     hypromellose (GENTEAL) 0.3 % SOLN, 1 drop every hour as needed for dry eyes , Disp: , Rfl:     lanolin ointment, Apply topically every hour as needed for other (sore nipples), Disp: 7 g, Rfl: 3    lidocaine (LMX4) 4 % external cream, Apply topically once as needed for mild pain, Disp: 120 g, Rfl: 1    LINZESS 290 MCG capsule, TAKE 1 CAPSULE BY MOUTH EVERY DAY IN THE MORNING BEFORE BREAKFAST, Disp: 90 capsule, Rfl: 0    mometasone (ELOCON) 0.1 % external ointment, Apply topically 2 times daily, Disp: 45 g, Rfl: 11    ondansetron (ZOFRAN ODT) 8 MG ODT tab, Take 1 tablet by mouth every 8 hours as needed, Disp: , Rfl:     polyethylene glycol (MIRALAX/GLYCOLAX) powder, Take 1 capful by mouth 3 times daily, Disp: , Rfl:     Prenatal MV-Min-Fe Fum-FA-DHA (PRENATAL 1 PO), , Disp: , Rfl:     rizatriptan (MAXALT-MLT) 5 MG ODT, DISSOLVE 1 OR 2 TABLETS IN THE MOUTH AS NEEDED FOR HEADACHE AT ONSET OF MIGRAINE, MAY REPEAT IN 2 HOURS AS NEEDED. MAX 30MG IN 24 HOURS AND MAX 5 DAYS PER MONTH, Disp: , Rfl:     sodium fluoride 1.1 % CREA, Apply 1 Application topically daily, Disp: , Rfl:     triamcinolone (KENALOG) 0.1 % external ointment, Apply twice daily as needed to lesions on the genitals and body. OK to use very sparingly on the face., Disp: 454 g, Rfl: 2    vitamin D2 (ERGOCALCIFEROL) 11928 units (1250 mcg) capsule, Take 1 capsule (50,000 Units) by mouth every 7 days, Disp: 12 capsule, Rfl: 0    lactulose 20 GM/30ML SOLN, Take 30 mLs by mouth 3 times daily as needed (for constipation) (Patient not taking: Reported on 2/27/2024), Disp: 300 mL, Rfl: 3    sucralfate (CARAFATE) 1 GM/10ML suspension, Take 10 mLs (1 g) by mouth 4 times  daily (Patient not taking: Reported on 2024), Disp: 1200 mL, Rfl: 2    Current Facility-Administered Medications:     botulinum toxin type A (BOTOX) 100 units injection 200 Units, 200 Units, Intramuscular, Q90 Days, Alejandrina Hernandez MD    botulinum toxin type A (BOTOX) 100 units injection 200 Units, 200 Units, Intramuscular, Q90 Days, Alejandrina Hernandez MD, 200 Units at 24 1433    triamcinolone (KENALOG-40) injection 40 mg, 40 mg, , , Fleischmann, Andres, DPM, 40 mg at 10/11/21 0928    triamcinolone (KENALOG-40) injection 40 mg, 40 mg, , , Fleischmann, Andres, DPM, 40 mg at 06/15/21 0957    triamcinolone (KENALOG-40) injection 40 mg, 40 mg, , , Fleischmann, Andres, DPM, 40 mg at 09/15/20 1554       BOTULINUM NEUROTOXIN INJECTION PROCEDURES    VERIFICATION OF PATIENT IDENTIFICATION AND PROCEDURE     Initials   Patient Name SES   Patient  SES   Procedure Verified by: SES     Prior to the start of the procedure and with procedural staff participation, I verbally confirmed the patient s identity using two indicators, relevant allergies, that the procedure was appropriate and matched the consent or emergent situation, and that the correct equipment/implants were available. Immediately prior to starting the procedure I conducted the Time Out with the procedural staff and re-confirmed the patient s name, procedure, and site/side. (The Joint Commission universal protocol was followed.)  Yes    Sedation (Moderate or Deep): None    ABOVE ASSESSMENTS PERFORMED BY    Alejandrina Hernandez MD    INDICATIONS FOR PROCEDURES  Samara Oropeza is a 28 year old patient with pain secondary to the diagnosis of chronic migraine headaches. Her baseline symptoms have been recalcitrant to oral medications and conservative therapy.  She is here today for reinjection with Botox.    GOAL OF PROCEDURE  The goal of this procedure is to decrease pain.      TOTAL DOSE ADMINISTERED  Dose Administered:  200 units  Botox  (Botulinum Toxin Type A)       2:1 Dilution   Unavoidable Drug Waste: No.  Diluent Used:  Preservative Free Normal Saline  Total Volume of Diluent Used:  4 ml  NDC #: Botox 100u ([address]-12)      CONSENT  The risks, benefits, and treatment options were discussed with Samaraheber Oropeza and she agreed to proceed.    Written consent was obtained by  [facility] .     EQUIPMENT USED  Needle-25mm stimulating/recording  Needle-30 gauge  EMG/NCS Machine    SKIN PREPARATION  Skin preparation was performed using an alcohol wipe.    GUIDANCE DESCRIPTION  Electro-myographic guidance was necessary throughout the neck portion of the procedure to accurately identify all areas of spastic muscles while avoiding injection of non-spastic muscles, neighboring nerves and nearby vascular structures.       AREA/MUSCLE INJECTED:  200 UNITS BOTOX = TOTAL DOSE, 2:1 DILUTION     1. SHOULDER GIRDLE & NECK MUSCLES: 60 UNITS BOTOX = TOTAL DOSE     Right Splenius Cervicis - 5 units of Botox over 2 site/s.   Left Splenius Cervicis - 5 units of Botox over 2 site/s.     Right Rectus Capitis - 2.5 units of Botox at 1 site.  Left Rectus Capitis - 2.5 units of Botox at 1site.     Right Levator Scapulae - 10 units of Botox over 2 site/s.   Left Levator Scapulae - 10 units of Botox over 2 site/s.    Right Anterior Scalene - 2.5 units of Botox over 1 site/s.   Left Anterior Scalene - 2.5 units of Botox over 1 site/s.     Right Sternocleidomastoid - 2.5 units of Botox over 1 site/s.   Left Sternocleidomastoid - 2.5 units of Botox over 1 site/s.     Right Rhomboid Major - 5 units of Botox over 1 site/s.    Left Rhomboid Major - 5 units of Botox over 1 site/s.      Right Pectoralis Minor - 2.5 units of Botox over 1 site/s.    Left Pectoralis Minor - 2.5 units of Botox over 1 site/s.     2. FACIAL & SCALP MUSCLES: 140 UNITS BOTOX = TOTAL DOSE     Right Occipitalis - 5 units of Botox over 1 site/s.   Left Occipitalis - 5 units of Botox over 1 site/s.     Right  Frontalis - 10 units of Botox over 2 site/s.  Left Frontalis - 10 units of Botox over 2 site/s.     Right Temporalis - 50 units of Botox over 8 site/s.  Left Temporalis - 50 units of Botox over 8 site/s.     Right  - 2.5 units of Botox over 1 site/s.              Left  - 2.5 units of Botox over 1 site/s.                 Procerus - 5 units of Botox at 1 site/s.      BILATERAL GREATER & LESSER OCCIPITAL NERVE BLOCKS, TRIGGER POINT INJECTIONS  1% lidocaine: 2 ml  0.5% bupivacaine: 10 ml  [place] ml = 40 mg      ONB: Area just inferior to insertion of the right and left superior trapezius insertion onto skull was cleansed with ChloraPrep. Needle was advanced anteriorly to base of skull then slightly withdrawn and injectate was injected in a fan-like distribution at different depths. An 8 ml mixture of 1% lidocaine, and methylprednisolone was divided into two 5 ml syringes. Total injection volume = 4 ml per side.     TPI: Areas of the skin were prepped with ChloraPrep.  Using standard precautions, a 30-gauge 1-inch needle was used to inject a 3 ml mixture of 1% lidocaine and 0.5% bupivacaine into the upper trapezius and rhomboid major/minor muscles bilaterally for a total of 8 sites.         RESPONSE TO PROCEDURE  Samara Oropeza tolerated the procedure well and there were no immediate complications. She was allowed to recover for an appropriate period of time and was discharged home in stable condition.    ASSESSMENT AND PLAN   Botulinum toxin injections: No changes made to Botox dose or distribution today. Occipital nerve blocks and trigger point injections also administered per patient request to address occipital neuralgia and myofascial pain. Patient will continue to monitor response to Botox and report at next appointment.   Referrals: None.   Follow up: Samara Oropeza was rescheduled for the next series of injections in 12 weeks, at which time we will evaluate response to today's  injections. she may call the clinic prior with any questions or concerns prior to the next appointment.

## 2024-05-29 NOTE — TELEPHONE ENCOUNTER
My COPD Action Plan     Name: Philipp Jefferson    YOB: 1958   Date: 5/29/2024    My doctor: Ramon Vinson MD   My clinic: [facility]    [address]  [place] [address]  [phone]  My Controller Medicine: { :141058}   Dose: ***     My Rescue Medicine: { :406711}   Dose: ***     My Flare Up Medicine: { :918357}   Dose: ***     My COPD Severity: { :519150}      Use of Oxygen: { :893512}     Make sure you've had your pneumonia   vaccines.          GREEN ZONE       Doing well today    Usual level of activity and exercise  Usual amount of cough and mucus  No shortness of breath  Usual level of health (thinking clearly, sleeping well, feel like eating) Actions:    Take daily medicines  Use oxygen as prescribed  Follow regular exercise and diet plan  Avoid cigarette smoke and other irritants that harm the lungs           YELLOW ZONE          Having a bad day or flare up    Short of breath more than usual  A lot more sputum (mucus) than usual  Sputum looks yellow, green, tan, brown or bloody  More coughing or wheezing  Fever or chills  Less energy; trouble completing activities  Trouble thinking or focusing  Using quick relief inhaler or nebulizer more often  Poor sleep; symptoms wake me up  Do not feel like eating Actions:    Get plenty of rest  Take daily medicines  Use quick relief inhaler every *** hours  If you use oxygen, call you doctor to see if you should adjust your oxygen  Do breathing exercises or other things to help you relax  Let a loved one, friend or neighbor know you are feeling worse  Call your care team if you have 2 or more symptoms.  Start taking steroids or antibiotics if directed by your care team           RED ZONE       Need medical care now    Severe shortness of breath (feel you can't breathe)  Fever, chills  Not enough breath to do any activity  Trouble coughing up mucus, walking or talking  Blood in mucus  Frequent coughing Rescue medicines  Reached out to patient to schedule.   Patient has an appt Thursday (8/3) and will schedule her f/u then.   Below information was relayed. Closing encounter.     Chikis ARRIAGA    [facility]     are not working  Not able to sleep because of breathing  Feel confused or drowsy  Chest pain    Actions:    Call your health care team.  If you cannot reach your care team, call 911 or go to the emergency room.        Annual Reminders:  Meet with Care Team, Flu Shot every Fall  Pharmacy:    [id number] - [place], [place] - [address]  [facility] - [place] [phone] [place]  [facility] - [place] [phone] [place]

## 2024-06-05 ENCOUNTER — VIRTUAL VISIT (OUTPATIENT)
Dept: RHEUMATOLOGY | Facility: CLINIC | Age: 29
End: 2024-06-05
Attending: INTERNAL MEDICINE
Payer: COMMERCIAL

## 2024-06-05 VITALS
DIASTOLIC BLOOD PRESSURE: 73 MMHG | SYSTOLIC BLOOD PRESSURE: 113 MMHG | WEIGHT: 141 LBS | BODY MASS INDEX: 24.98 KG/M2 | HEIGHT: 63 IN

## 2024-06-05 DIAGNOSIS — R11.0 NAUSEA: ICD-10-CM

## 2024-06-05 DIAGNOSIS — M19.90 INFLAMMATORY ARTHRITIS: Primary | ICD-10-CM

## 2024-06-05 DIAGNOSIS — L29.9 PRURITUS: ICD-10-CM

## 2024-06-05 DIAGNOSIS — M79.7 FIBROMYALGIA: ICD-10-CM

## 2024-06-05 DIAGNOSIS — Z51.81 MEDICATION MONITORING ENCOUNTER: ICD-10-CM

## 2024-06-05 PROCEDURE — 99214 OFFICE O/P EST MOD 30 MIN: CPT | Mod: 95 | Performed by: INTERNAL MEDICINE

## 2024-06-05 PROCEDURE — G2211 COMPLEX E/M VISIT ADD ON: HCPCS | Performed by: INTERNAL MEDICINE

## 2024-06-05 RX ORDER — CONTAINER,EMPTY
EACH MISCELLANEOUS
Qty: 1 EACH | Refills: 11 | Status: SHIPPED | OUTPATIENT
Start: 2024-06-05

## 2024-06-05 RX ORDER — METHOTREXATE 25 MG/ML
10 INJECTION INTRA-ARTERIAL; INTRAMUSCULAR; INTRATHECAL; INTRAVENOUS
Qty: 10 ML | Refills: 1 | Status: SHIPPED | OUTPATIENT
Start: 2024-06-05

## 2024-06-05 RX ORDER — FOLIC ACID 1 MG/1
1 TABLET ORAL DAILY
Qty: 90 TABLET | Refills: 3 | Status: SHIPPED | OUTPATIENT
Start: 2024-06-05

## 2024-06-05 RX ORDER — ONDANSETRON 8 MG/1
8 TABLET, ORALLY DISINTEGRATING ORAL EVERY 8 HOURS PRN
Qty: 90 TABLET | Refills: 3 | Status: SHIPPED | OUTPATIENT
Start: 2024-06-05

## 2024-06-05 ASSESSMENT — PAIN SCALES - GENERAL: PAINLEVEL: MODERATE PAIN (5)

## 2024-06-05 NOTE — LETTER
2024       RE: Samara Oropeza  [address]  [facility] [id number]     Dear Colleague,    Thank you for referring your patient, Samara Oropeza, to the [facility] RHEUMATOLOGY CLINIC [place] [facility]. Please see a copy of my visit note below.    Virtual Visit Details    Joined the call at 2024, 1:55:22 pm.  Left the call at 2024, 2:07:49 pm.    Originating Location (pt. Location): Home  Distant Location (provider location):  Off-site  Platform used for Video Visit: AmoLyfe    Office Visit Details      Rheumatology Clinic Return Visit Patient     Samara Oropeza MRN# [id number]   YOB: 1995 Age: 28 year old     Date of Visit: 2024   Last seen: 2024       Assessment & Plan    Assessment & Plan  Samara is a 28 year old  female (ADRIAN 22) with Behçet's disease (pathergy, oral/genital ulcers, polyarthralgia) who presents today for RECHECK  .    # Behçet's disease (pathergy, arthralgias, recurrent oral / genital ulcers)  # s/p delivery on 2023 with high-risk pregnancy, complicated by pre-eclampsia,  birth but healthy baby    10/2022:  Mrs. Oropeza arrives for follow-up of her Behçet's disease that is more active at present with recent decrease of her Otezla (60mg --> 30mg) recommended by [facility]. Previous discussion with her OB providers had been to decrease to the lowest effective dose, clearly 30mg is not effective as she describes worsened oral ulcers, genital ulcers, arthralgias, abdominal pain, and thrombophlebitis. Although there is not a great deal of evidence for Otezla safety in pregnancy, its mechanism of action would suggest it. Counterbalancing these concerns are what we understand of Behçet's in pregnancy which more often improves but in some cases (~29%) becomes worse, and can flare more often in the 1st trimester and post- period.  She also  describes some vision changes, which in the context of Behçet's is conerning. She has not seen Ophthalmology in many years; I will place a referral today.     6/21/2023: Stable today, on otezla 30 mg bid, breastfeeding, MFM is ok with breastfeeding, discussed ACR reproductive guideline, it does not recommend otezla during pregnancy and breastfeeding given lack of data; however Samara remained on it during pregnancy and now breastfeeding as stopping it leads to severe flare of her behcet and benefits of staying on it outweighs risks. Her MFM provider is aware of staying on otezla and is ok with it during breastfeeding.    9/27/23: Doing well, remains on otezla. Has had some interruptions in doses. Today has back and hand pain, and ankle swelling. Will start celebrex, ok with breastfeeding.     2/1/2024:    Behmain's oral/vaginal ulcers are under fair control on otezla, will continue. But she continues to have active inflammatory arthritis, she failed celebrex. Will add  mg bid; risks were discussed. HCQ is safe in pregnancy and breastfeeding in case of pregnancy in future . Will check labs.      Today 6/5/2024:    Active inflammatory arthritis despite adding HCQ in 2/2024, HCQ causes SE, fingers are deforming. Recommend to add methotrexate; risks were discussed. Was informed that it is not allowed in pregnancy and breastfeeding. She will start after she is done with breastfeeding in 8/2024. She prefers sub q inj over oral to avoid GI SEs. Will continue otezla to control oral/genital ulcers. I reviewed labs, stable.    Plan 6/5/2024:    When you stop breastfeeding, start methotrexate 0.4 ml weekly under skin injection, our pharmacist Natalie (MTM referral) will call you to teach you how to do it    Start folic acid 1 mg a day to help with methotrexate side effects    Stop the hydroxychlorquine    Stay on otezla    Labs one month after start of methotrexate    Avoid pregnancy on methotrexate    Refilled elavil  and zofran    Return in person or video in about 3-4 months    The longitudinal plan of care for the diagnosis(es)/condition(s) as documented were addressed during this visit. Due to the added complexity in care, I will continue to support Samara in the subsequent management and with ongoing continuity of care.          Dominga Sosa MD    Orders Placed This Encounter   Procedures    Erythrocyte sedimentation rate auto    CRP inflammation    ALT    Albumin level    AST    Creatinine    Med Therapy Management Referral    CBC with Platelets & Differential                Medical History   History of Present Illness  Samara Oropeza is a 27 year old female who presents for follow-up of her Behçet's in the context of pregnancy.      Seen Dr. Marilyn Moran Rheumatology in [facility] 10/14:    Original presentation 2014:     Ms Oropeza is a 19 y.o. female who presents with one year of presentation of painful oral ulcers (monthly) once that have worsened with two episodes in the last two months that presented with painful vulvar or vaginal ulcers (2 episodes so far every month) [not sure if they leave scar] as well that timing coincides with menses ([facility]: 8/25/14).   ORAL ULCERS: last two episodes were severe, painful, white base with erythematous halo, that appear and disappear in the matter of 1-2 weeks without, does not bleed and doesn't respond to any treatment used (magic mouthwash), 10-15 in number with the biggest one being dime size.      VAGINAL ULCERS: 2-3 in number between labia majora and minora that occur simultaneously with oral ulcers, painful, non bleeding ulcers that are erythematous, no pus.      FOLLICULITIS: patient reports having spots in legs specially around ankle and knee, but arms, and neck are affected as well. Small lesions that resemble ingrown hair, most are red erythematous elevated lesions, well demarcated, some with liquid that patient refers as pimple like.      SKIN RASHES: welts and red  macules with well defined borders that are pruritic and non-ulcerative on chest, arms and back. Benadryl relieves.      ARTHRALGIAS: In descending order of severity: hips, knees, wrists, shoulders, neck, lumbar, fingers.   C/o morning stiffness in hips, back and neck lasting for about until noon.      Ms. Oropeza reports they present in severe flares and never fully resolve, but do get better. She has seen some swelling and warmth on the affected joints but has not noticed and redness. Has tried Tylenol, ibuprofen, and hydrocodone with not much benefit.      FEVERS: Reports 3-4 sporadic episodes per month of self limited fevers of 38 C that occur during the evenings and associate facial flushing.      HEADACHES: occur daily and are bitemporal and irradiate occipitally, pulsatile in nature, intensity varies from intense to mild, are worsened with movement. On treatment with ibuprofen and somatriptan without any positive results.      VISION CHANGES: Patient reports that suddenly she would only see a gray blotch in her right eyes and would persist like this for a day and a half. Also reports blurriness in her left eye. Presence of pain and burning sensation of eyeball with associated redness. Has changed prescription glasses in the matter of 2 years 3 times. She has had opthalmology exam and was not suggestive of any evidence of uveitis.   She was also evaluated in ED for a dizzy spell.      ABD PAIN W/ INTERMITTENT DIARRHEA AND CONSTIPATION: Patient reports abdominal pain that is intermittent, periods of 7 days without bowel movement and feeling bloated with change of pattern to diarrhea (liquidy without blood nor mucus, non foul smelling). Has taken Bentyl, Zegrid, and rinetidine with mild clinical improvement.  She also reports small red nodules after each needle stick for blood draw.   Neurology saw her back then and was not impressed with her presentation as physical examination was normal. Also MRI brain normal:  Findings: No definite hemorrhage, mass affect, midline shift, or ventriculomegaly is noted.There are a few scattered non specific white matter T2 hyperintensities. Axial diffusion weighted images are unremarkable.     The major vascular structures appear patent. There is opacification and severe mucosal thickening in the right maxillary sinus. The remaining paranasal sinuses, and mastoid air cells are clear. Orbits are unremarkable  She has + HSV-1 IGG. Seen ID. Negative for Herpes simplex virus culture. HSV IGM I/II COMBINATION SENT TO [facility]  RESULTS = <0.91  REF RANGE: <0.91 = NEGATIVE  ID did not think HSV could explain her mouth and genital sores.      CRP within normal limits. HIV negative. CBC within normal limits; UA negative. Creatinine within normal limits   CYNDIE neg.  Antigliadin neg.   ANti TTG neg.   [facility] 2014: Colonoscopy and endoscopy neg; result not available. Not sure if biopsies were done.   Raynaud's phenomenon:  Onset: about 2013  Digits: all fingers  Digital ulcers: no  Color changes: white ---> purple and red  Duration of an attack: About couple of hours per mom  Pain during attack: not clear pain but bruise like feeling  Frequency of attacks: few times a month  Family history of Raynauds: no  GERD: very long time     No hemoptysis.   No miscarriages/ no thrombosis in past.   No family or personal history of psoriasis, ulcerative colitis or chron's disease.   No buttock pain or low back pain or stiffness in AM     Interim history:  Dec 2014- Multiple mouth ulcers and did not eat for 5 days. This coincided with periods. Colchicine 0.6mg PO BID started in Nov 2014.   Prednisone taper in Dec 20mg to 0 in 4 weeks. She also used magic mouthwash. Kenalog cream. After going to the ER, 3 days later her ulcers were better. She thinks it improved after the menstruation stopped.   LMP 3 weeks ago.   COLCHICINE HAS HELPED A LOT REDUCING THE INTENSITY OF MENSTRUATION ASSOCIATED ORAL AND VULVAR ULCERS.     "  Interim history: 6/15     Since last visit only two episodes of mouth sores- small sized 6   No genital ulcers since last visit.   Colchicine 1.2 mg in AM and 0.6mg in PM keeps her symptoms under control.      Admitted in 5/15:  Admission Diagnoses:  - LUE weakness  - spell  - hx of migraines  - hx of Tourette syndrome  - hx of Behcet's disease     Discharge Diagnoses:   - spell likely 2/2 anxiety  - hx of migraines  - hx of Tourette syndrome  - hx of Behcet's disease     CT and MRI brain was normal.      Seeing Dr. Lilliana Miramontes soon as outpatient.      Dr. Garcia did not find any intraocular inflammation.       Interm 10/30/2015  ED for migraine. Continues to see neuro - MRI brain without lesions noted. Saw GI - upper barium normal. May be considering repeat colo. Worsening lesions in mouth and genitals. Has had continuous lesion in mouth x 2 months. 2 genital ulcers. Had fracture of right sesamoid in foot. Continues to have low grade fevers. New folliculitis on chest, legs and arms.      Interim hx: 1/11/2016    Pt states that since last visit she continues to have oral ulcers and has noted more folliculitis-like lesions on her lower extremities.  She states that on her right lower leg she had a red macular spot that has since resolved.  She denies uveitis.  She states that the colchicine initially helped but then stopped working.  She takes 0.6 mg TID.  She stopped taking her prednisone last week as she feels like this hasn't helped.  She hasn't had any episodes of vaginal ulcers.      She saw GI who stated she has gastroparesis.  She is seeing Cardiology later this week for possible syncopal episodes.       Interim history 5/16  Mouth ulcers X 1 last month  Genital ulcers - none since last visit.      Bilateral MCPs pain, knee pain and low back pain +ve increased since last visit  Morning stiffness X 2 hours (increasing)   5-6/10     \"overall myalgia\" has gotten worse    C/o pseudofolliculitis lesion on " anterior shins bilaterally worse     Interim history: (7/18/2016)  Patient has just started Humira for 2 doses on 7/1 and 7/15 and reported minimal improvement of her hip pain but otherwise, did not notice anything different.      She reported painful mouth ulcer once a month since last visit but noticed that it has been getting deeper.   Denied any genital ulcers.     Still has ongoing bilateral MCPs pain, knee pain but this has been intermittent and she did not have any much pain today. She reported 2-3 hours morning stiffness of both hands and pain score of 6/10 at her knees and 8/10 of her hands but currently takes no pain medication except prn ativan which she takes when her muscle is really tight.      The only concern is that she gained weight even though she has not been eating much (eat once a day) and do exercises every day for 1 hour (with video of yoga, pilates). Her mother wonders if she needs to have some labs work up include sex hormone, thyroid, cortisol.     Interval history 9/14/16  Patient was started Humira at the last visit, patient reports worsening of skin ulcers since starting Humira and stopped taking Humura for the past 2 weeks. Patient reports oral and genital ulcers has been stable even before starting Humira and has not had any interval oral/genital ulcers. However she reports recurrent skin ulcers occurring on a daily basis that affects her chest and anterior legs. Patient also has stopped taking prednisone, she reports that she doesn't feel the prednisone helps, her last dose of prednisone was 6+ months ago. Patient also report worsening low back pain that sometimes causes her to limp.     In the interval patient also visited ED on 8/31/16 for left hand pain and what the patient describes as phlebitis, no medication or procedure was done and the patient was discharged with a splint. Patient also reported 1 episode of chest pressure that was associated with dyspnea and numbness of the  left arm, she was shopping in a [id number] at the time of onset, and the pressure resolved after she took a nap.     Patient denies any infectious symptoms in the interval, no fever, chills, night sweat, cough, dysuria, denies eye pain or visual changes.     November 4, 2016  Oct - had one genital ulcer  Oral ulcers X 5- around menstruation time.   Knee pain- chronic on the left side  Dizziness spells - on and off; been to the ER for that in the past.   On and off migraines  July 2013 - s/p MPFL reconstruction plus LRL 7/2013  Morning stiffness in knee (left) X 1 hour     Severe jaw pain and chest pain- patient wondering if this is Tourette's or esophageal spasms. Cardiac workup negative.   Fever      January 13, 2017  Yesterday in ER [facility] with orthostatic syncope from dehydration.   Chest pain on and off still continues. Painful with deep breaths.   Oral ulcers - few minor ones lasting for one week;   One major one in roof of mouth lasting for about one week.   Knee pain +ve - reviewed the recent MRI with her orthopedic surgeon.   On and off migraines  otezla is approved. Still waiting to receive the meds.      March 31, 2017  Otezla current dose 15mg PO BID.   She is tolerating okay at this dose and is willing to increase the dose further up to 30mg BID.   Her joint pains are better on otezla. Knee pain is also better.   Back and neck pain +ve 8/10 when it is worse. Intramuscular botox injections were given on Monday in PMR.   2 minor genital ulcers in the interim- resolved.   Few oral ulcers in the interim- resolved.   Nasal ulcer - resolved.   Migraines on and off.   Nausea with otezla but improving.   Dizziness and blackouts on and off. She fell once and hurt her ankle. Wearing boot in left leg.   Complete ROS negative except for above     May 17, 2017  Tolerating otezla better. Not throwing up anymore. Current dose 20mg in AM and 30mg in PM (2nd week).   Patient thinks that her oral ulcers frequency and  severity are improving with otezla. Also no genital ulcers in the interim.   Currently on doxycycline for bronchitis/?pneunomia. 4 more days left.      Joint pain history  2 weeks ago/ dx with pneumonia 1 week ago/  Involved joints: all over  Pain scale: 6.5/10   Wakes the patient from sleep : Yes  Morning stiffness: Yes for 120 minutes  Meds used:otezla,colchicine     Interim history  Since last visit:  1. Infections - Yes/ pneumonia  2. New symptoms/medical problem - Yes/ thinks she may have had a mini-seizure about 10 days ago ; did not seek medical attention; did not reoccur after that. Patient seeing PCP today.  3. Any side effects from Rheum medications -vomiting a lot (resolved)  3. ER visits/Hospitalizations/surgeries - Yes  4. Last PCP visit: has an apt. today     Patient still getting double vision. Floaters present.      Therapist recommended psychiatry evaluation. Patient does not want to see psychaitry. Patient will see PCP today.      August 22, 2017  Have you ever seen a rheumatologist Yes Who You When 5/17/17     NO genital ulcers in the interim.   Mouth ulcers- three in the back of the throat and roof of the mouth last month.      Joint pain history  Onset: doing alittle worse / cut her otezla back to 30mg  Daily due to GI problems  Involved joints: all over her body. Been having more black out episodes, been having more chest pains.also has raised bumps on both legs from the knees down that itch and are painful   Pain scale:  5.5/10     Wakes the patient from sleep : Yes  Morning stiffness:Yes for 120 minutes  Meds used:otezla, colchicine (three pills daily)     Interim history  Since last visit:  1. Infections - Yes stomach issue  2. New symptoms/medical problem - No  3. Any side effects from Rheum medications -nausea from otezla  3. ER visits/Hospitalizations/surgeries - Yes, ER for foot, ankle injury from passing out and fell down the steps. Hit her head another time from passing out. Alcohol  poisoning in July 4. Last PCP visit: 6/23/17 September 19, 2017  Have you ever seen a rheumatologist Yes Who You When 8/22/17  Joint pain history  Onset: pt states that she hurts everywhere for 6 days  Involved joints: all joints  Pain scale:  7/10     Wakes the patient from sleep : Yes/ not sleeping at all  Morning stiffness:Yes for 180 minutes  Meds used:colchicine, otezla, magic mouth wash, lidocaine gel  Last one week: increased joint pain; and genital ulcer  Genital lesion (dimed size) in the last one week  After botox a week ago, she had fever 101 for three days; near syncopes. Back pain; floaters  Chest pain , mouth sores, hand pain, morning pain were all better with otezla 30mg twice daily.     Interim history  Since last visit:  1. Infections - No  2. New symptoms/medical problem - No  3. Any side effects from Rheum medications -nausea from the otezla  3. ER visits/Hospitalizations/surgeries - No  4. Last PCP visit: may 2017      October 18, 2017     Have you ever seen a rheumatologist Yes Who You When 9/19/17  Joint pain history  NO mouth or genital sores in the last 4 weeks.   Medrol dosepak helped with visual symptoms but not joint pains.      Onset: pt states that she is doing better than last time with her behcet's. Today has severe stomach pains, states that she is constipated. Pt had a seizure 2 days ago. Does have a metallic taste in her mouth  Involved joints: hands , neck, shoulders  Pain scale:  9/10 from stomach pain;      Wakes the patient from sleep : Yes  Morning stiffness:Yes for 120 minutes  Meds used:cochicine , otezla 30mg twice daily     Interim history  Since last visit:  1. Infections - No  2. New symptoms/medical problem - Yes/ the cartilage in her chest is inflamed  3. Any side effects from Rheum medications -nausea from the otezla  3. ER visits/Hospitalizations/surgeries - No  4. Last PCP visit: yesterday     January 16, 2018  Have you ever seen a rheumatologist Yes Who You  When 10/18/17  Joint pain history  Onset: pt states that she was in the hospital for 5 days before maribell, they told her she had lesions in her brain in the white matter. Had blood work drawn in the ER and they told her her inflammatory markers were elevated. Was seen in the ER last week for vomiting and diarrhea, not eating. Saw Gastro. On 1/10/18. 1.5 weeks ago she had an endoscopy and colonoscopy. She has been having elbow  And hands and knee pain, loosing use of her hands. Been dropping things. Also states that she has been getting lesions up her nose  Involved joints: see above  Pain scale:  8/10     Wakes the patient from sleep : Yes  Morning stiffness:Yes for 120 minutes  Meds used:colchisine, otezla, magic mouth wash, kenolog cream, lidocaine gel     Interim history  Since last visit:  1. Infections - Yes/ had an infection in her jaw. Having sinus issues  2. New symptoms/medical problem - Yes/ states that she is having problems walking, states that she was bouncing when she was walking  3. Any side effects from Rheum medications -otezla, nausea  3. ER visits/Hospitalizations/surgeries - Yes/ see chart  4. Last PCP visit: November 2017 April 17, 2018  Have you ever seen a rheumatologist Yes Who You When 1/16/18  Joint pain history  Onset: pt states that she is not doing well. She is having increased stomach issues, only eats 2 times in 4 days unable to keep the otezla down due to vomiting . Has sleep study done in 1 week; no genital ulcers since last appointment. 6 small mouth sores since last appointment.   Involved joints: see above   Pain scale:  7/10     Wakes the patient from sleep : Yes  Morning stiffness:Yes for 60 minutes  Meds used:otezla , colchicine, diclofenac gel, magic mouthwash, lidocaine gel      Interim history  Since last visit:  1. Infections - Yes/ had a sinus infection, thinks she is getting sick now  2. New symptoms/medical problem - Yes/ neurology sent pt to cardiology. Has a  heart condition but not sure what . She is seeing a ortho. Due to knee pain   3. Any side effects from Rheum medications -nausea/vomiting from the otezla   3. ER visits/Hospitalizations/surgeries - Yes/ seen in ER   4. Last PCP visit: yesterday     July 17, 2018  Have you ever seen a rheumatologist Yes Who You When 4/17/18  Joint pain history  Onset: pt Is here for a follow-up states that she started to get lesions on her legs , face and arm. Hurts all over, lower back is very painful. Right hand and wrist area are numb  Involved joints: see above  Pain scale:  7/10   worse in the morning  Wakes the patient from sleep : Yes/ does not go to sleep till late  Morning stiffness:Yes for 4-5 hours minutes  Meds used:colchicine, otezla has  Not started otezla yet due to extreme nausea      Interim history  Since last visit:  1. Infections - Yes/ had a bacterial infection in her pelvic area was hospitalized  2. New symptoms/medical problem - Yes/ hands are swelling up. Having orthopedic issues. Was having problem with her veins sticking up, and very painful. Has happened multiply times   3. Any side effects from Rheum medications -see above  3. ER visits/Hospitalizations/surgeries - Yes/ hospital and ER for bacterial infection  4. 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 ER said may have blood clot in calf, but when did MRI, stated body may have broken it up on it's own. Elevated D-dimer  Involved joints: Knees, neck, hands  Pain scale:  6.5/10     Wakes the patient from sleep : Yes  Morning stiffness:Yes for 180 minutes  Meds used:hyoscyamine, not taking otezla. Last dose Sep. Patient did not want to take otezla with the antibiotics.      Last major mouth ulcer- aug or Sep  No genital ulcers in the last 6 months.      Interim history  Since last visit:  1.  Infections - Yes, UTI's twice a month since last visit  2. New symptoms/medical problem - No  3. Any side effects from Rheum medications -otzela causes nausea  3. ER visits/Hospitalizations/surgeries - Yes, 1/2/19 repaired some ligaments and mass taken out, also joint build up removed from a previous injury  4. Last PCP visit: 12/5/19 June 12, 2019  Have you ever seen a rheumatologist yes Who you When 1/9/19  Joint pain history  Onset: Patient is here for a follow up. A lot of infections and in and out of the hospital, who wanted her to follow up with Rheumatology again.  Involved joints: knees, legs, back, neck, shoulders, ankles  Pain scale:  6.5/10     Wakes the patient from sleep : Yes  Morning stiffness:Yes for 120 minutes  Meds used:magic mouthwash, colchicine     Interim history  Since last visit:  1. Infections - Yes, staph  2. New symptoms/medical problem - kidney failure  3. Any side effects from Rheum medications -none  3. ER visits/Hospitalizations/surgeries - Yes, 3 hospitalizations, and surgeries,  4. Last PCP visit: 6/11/19        Today 9/30/2020: New to me, establishing care. Flaring off otezla.     816   Reports worsening oral ulcers and joint pain and skin rash.     No vaginal ulcers currently. Last ones were 5 months ago.     Gets oral ulcers monthly, not today, had them last few days ago. They come up as flare up around period time. They self-resolve.     Thinks colcrys is helping but not enough, lost some benefit. No longer has diarrhea from it. the dose is 0.6 mg bid.     Came off otezla last year when she had severe staff infection, there was drug drug interaction with vancomycin. Wants to go back on it.     Skin lesions over chest are clearing up. Has skin lesions over her legs.     She is off of otezla >1 yr. She had daily vomiting and abdominal cramps initially which later resolved after 2 months.      Today, is her last day liz her health insurnaace  .     Reports pain and swelling liz  hands. Drops things, lost strenghth. Veins look inflamed. Hands are swollen in AM and before bedtime. AM stiffness is 2 hours. can t tolerate ibuprofen.     Prednnisonne does not help much.     Wants to try eleve.     Had 2 blood clots over L arm, finished xraalto 4 months ago.      Was told to have severe dry eyes, hs not had eyes checked> 1 yr as was in the hospital with staff infection. systanee nd gentle eyedrops help some, sometimes gets sharp pain and and blurry vision in her eyes from dryness. No h/o uveitis.     has dry mouth, drinks water.     Gets T  F sometimes. It is sporadic.     Lost hair.     Gets intermittent CP. Had several hospital visits and admissions in the [facility] for it. lorazepam helped witch chest spasms, she does not like pain meds.     She was prescribed 0.5 mgq d prn, 10 tbs/monthss.     She gets dry cough, just started using albuterol inhaler, it helps.     No SOB.     Has gastroparesis, IBS  nd h/o severas admission for constiption, colon blockage with impaction and gets bloating. has lost follow up with GI.     She is working with  to get medical assistance to get insurance back by early next year. She filed it again.     Gets botox inj every 3 months nd they help, has to cancel 10/20 botox inj s will not have insurance. they came back yesterday.     last seizure waas last year. still gets black out spells, salts ne was last week.    derm eye gi     FHx: Both parents have RA.  SHx: She was working in a [employer] shop, it was closed because of pandemic. sexually active, not on oral contraceptives. She thinks she had a misccraaaiaage last wk, had n period x2 months then mueller bleeding a lot, dark red and was different from period. Felt something came out that she feels she miscarried, no pos pregnancy test. No smoking. rarely drinks ETOH.  [facility]   thumbs between MCP tender   otezla helped with skin lesions, oral ulcers, joint  pain.  colcrys  daltone alondra mercedes [phone]        8/11/2022: Samara presents for urgent visit to discuss m/o Behcet's flare during pregnancy, she is   She is pregnant 10 wk 3 days with ADRIAN 3/6/2023.  In 2020, had 1st trimester miscarriage.  Has LBP, ankle pain/swelling.   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 Discussed with MFM, back on low dose otezla since last week, only 1 tab a day.    Interval History 10/28/2022  Mrs. Oropeza was last seen 08/11/22. At that time she was advised to continue otezla (1 tablet BID) and remain off colcrys. Since that time, she has instead taken otezla 1 tablet daily. She notes more joint pain in hands, wrists, cervical spine, knees with 4 hours of monring stiffness. Previously <1hr with full strength.     Had a few genital ulcers recently which only lasted a few days. Oral ulcers have been more active of late and are currently healing.     Abdominal pain on L side, pain is always worse after eating. Out of the phase of her pregnancy with morning sickness, at 22w on Monday.     She describes a few fevers at the end of September with hot flashes and cold sweats with Tmax 100. Called OB-GYN who instructed she should go in if persistent, though it resolved within a few days.     More easy bruising - thighs, she is currently on lovenox and baby aspirin. Previous history of clots especially with interventions. She recently had an issue with superficial thrombophlebitis following a blood draw.     Seeing more floaters in her vision. Sometimes tiny and clear, but can also be larger and 'gray', up to 1/4 of her vision. Has not seen Ophthalmology in many years.     More frequent migraines - did a nerve block recently but this has worn off, previous botox injections.     ADRIAN 03/06/23.    1/25/23:    Baby is growing small. Had high BP yesterday, they would monitor it. DBP was 90.    Increasing otezla to bid helped. It helped  with ulcers. Has skin breakdown since Christmas, never had it like this over her chest, but still it feels related to Behcet.    Hands and ankles are painful and swoleln, ankles are new but had hand pain with behcet before,. [facility] Her ADIRAN might be earlier based on baby's growth, induction at 37 wk, progress at 39. nex twk will have another check.    Still has the neck pain. Still gets eye floaters.    6/21/2023:    She delivered 1 month early due to pre-eclampsia. Had Mg infusion, had bleeding issues, spent a week. Was on BP med till a month ago, now stable. Was induced, had NVD. No blood transfusions needed.    Her baby girl Lsahanda was born 2/12/2023, small, slow growth but healthy with no fetal deformities. Had vaginal sores after giving birth. Still gets mouth ulcers, one oral ulcer now, no vaginal ulcers now. Plans to breast feed x 7 months. Does breastfeeding. [facility] They are aware she is on otezla.    She was on ASA 81 mg every day during pregnancy.    9/27/23: Doing well, less behcet flares since giving birth. Most recent flare on her chest, now healing. No new oral or vaginal ulcers.     She has been having back pain that shoots down her right leg since June and has been utilizing PT without improvement.     She also has had swelling and pain in both hands and right wrist, as well as both ankles L>R.     She notes missing some doses of otezla due to running out of refills and avoiding taking it without food. She has been eating less because she has been more tired since having her daughter.       2/1/2024:    Has back pain. Had epidural inj one wk ago, has to wait x 2 weeks, if no help to get surgery for bulging disc    Some oral lesions 2wk ago flare with swollen hands, but currently has no oral ulcers.     Hands hurt today with pain level about 4/10. Celebrex did not help much.    No vaginal ulcers    Reports loss of appetite past 5  months    Her baby girl was sick x past 2 weeks, just got better last night, she had RSV. Samara did not get much sleep, did not eat much over past 2 weeks because of this.      Today 6/5/2024:    -active inflammatory arthritis despite adding HCQ last visit. HCQ causes GI upset    -fingers are deforming, new    -not feeling well, has neck pain, nausea x past few days    -still breastfeeding, no pregnancy plans    -missed otezla x 2 days, got some skin lesions back over chest area; otherwise otezla is managing her behcet's sores well    Review of Systems   A comprehensive ROS was done. Positives are per HPI.    Past Medical History  Past Medical History:   Diagnosis Date    Anemia     Anxiety     Arthritis     Behcet's disease (H)     Cervical adenitis May 2010    Chronic abdominal pain     Constipation, chronic 1994    Fibromyalgia     Gastro-oesophageal reflux disease     Gastroparesis     Irregular heart beat     tachycardia, has had workup    Migraines     Neuromuscular disorder (H)     fibramyalgia    Palpitations     PONV (postoperative nausea and vomiting)     Seizure (H)     Seizures (H)     unknown etiology    Syncope     Tourette's       Past Surgical History:   Procedure Laterality Date    ARTHROSCOPY ANKLE, OPEN REPAIR LIGAMENT, COMBINED Left 9/25/2019    Procedure: LEFT ANKLE ARTHROSCOPY WITH LIGAMENT REPAIR;  Surgeon: Andres Johnson DPM;  Location:  OR    ARTHROSCOPY ANKLE, REPAIR LIGAMENT Left 1/2/2019    Procedure: Ankle arthroscopy and sinus tarsi evacuation, ligament repair, left lower extremity;  Surgeon: Andres Johnson DPM;  Location:  OR    ARTHROSCOPY KNEE WITH PATELLAR REALIGNMENT  7/25/2013    Procedure: ARTHROSCOPY KNEE WITH PATELLAR REALIGNMENT;  Left Knee Arthroscopy, Medial Patellofemoral Ligament Reconstruction with Allograft  ;  Surgeon: Jennifer Acevedo MD;  Location:  OR    COLONOSCOPY  2015    DENTAL SURGERY  1996    Teeth removal    ENDOSCOPY UPPER,  COLONOSCOPY, COMBINED  2005     ESOPH/GAS REFLUX TEST W NASAL IMPED >1 HR N/A 2/15/2017    Procedure: ESOPHAGEAL IMPEDENCE FUNCTION TEST WITH 24 HOUR PH GREATER THAN 1 HOUR;  Surgeon: Timothy Matta MD;  Location: [facility]    IR LUMBAR PUNCTURE  1/22/2024    IR PICC PLACEMENT > 5 YRS OF AGE  3/13/2019    IR UPPER EXTREMITY VENOGRAM LEFT  2/25/2020    IRRIGATION AND DEBRIDEMENT FOOT, COMBINED Left 3/12/2019    Procedure: COMBINED IRRIGATION AND DEBRIDEMENT LEFT ANKLE;  Surgeon: Micha Glover MD;  Location: [facility]    IRRIGATION AND DEBRIDEMENT LOWER EXTREMITY, COMBINED Left 5/7/2019    Procedure: 1.  Excision of wound down to and including deep fascia, less than 20 cm2.  2.  Irrigation and debridement, left ankle.;  Surgeon: Andres Johnson DPM;  Location: [facility] OR    PICC INSERTION Left 05/05/2019    4Fr - 45cm (5cm external), Basilic vein, SVC RA junction      Patient Active Problem List    Diagnosis Date Noted    Preeclampsia 02/10/2023     Priority: Medium    Infection of joint of ankle (H) 05/06/2019     Priority: Medium    Cellulitis 03/09/2019     Priority: Medium    Displacement of lumbar intervertebral disc without myelopathy 11/13/2018     Priority: Medium     Overview:   Created by Conversion      Iron deficiency associated with nonfamilial restless legs syndrome 11/13/2018     Priority: Medium    Enthesopathy of hip region 11/13/2018     Priority: Medium     Overview:   Created by Conversion      Tourette's syndrome 11/13/2018     Priority: Medium     Overview:   Created by Conversion      Somatic symptom disorder 06/01/2018     Priority: Medium    Pelvic floor weakness 04/25/2018     Priority: Medium    Spells of decreased attentiveness 12/19/2017     Priority: Medium    Mobile cecum (H28) 11/09/2017     Priority: Medium     Cecum noted in Right lower quadrant on 4/17 CT scan, and in Left upper Quadrant on CT on 11/2017.      Vitamin D deficiency 10/11/2017     Priority: Medium      How low, unknown/not found. On D when tested at 27. Starting cholecalciferol October 2017. Needs recheck.      Convulsions, unspecified convulsion type (H) 10/03/2017     Priority: Medium    Transient alteration of awareness 10/03/2017     Priority: Medium    Chronic pain syndrome 07/27/2017     Priority: Medium    Major depressive disorder, recurrent episode, moderate (H) 06/27/2017     Priority: Medium    Cervical pain 05/02/2017     Priority: Medium    Acute left ankle pain 03/31/2017     Priority: Medium    Cervical dystonia 03/28/2017     Priority: Medium    PTSD (post-traumatic stress disorder) 01/17/2017     Priority: Medium    Patellofemoral instability 10/20/2016     Priority: Medium    Fibromyalgia 08/04/2016     Priority: Medium    Rheumatoid arthritis of multiple sites without rheumatoid factor (H) 08/04/2016     Priority: Medium    Raynaud's disease without gangrene 08/04/2016     Priority: Medium    Chronic abdominal pain 08/04/2016     Priority: Medium    Palpitations 01/12/2016     Priority: Medium    On colchicine therapy 10/30/2015     Priority: Medium    Spell of shaking 05/06/2015     Priority: Medium    Migraine 02/04/2015     Priority: Medium    Behcet's disease (H) 12/10/2014     Priority: Medium    Headaches due to old head injury 11/12/2013     Priority: Medium    Milk protein intolerance 10/11/2013     Priority: Medium    Intestinal malabsorption 10/11/2013     Priority: Medium    Concussion 02/13/2013     Priority: Medium     Jan 2013, with prolonged recovery- followed by sports med        Knee pain 01/03/2013     Priority: Medium    Generalized anxiety disorder 06/25/2009     Priority: Medium    Tics - Tourette syndrome 05/18/2009     Priority: Medium     Followed by psychotherapy. Symptoms well managed. Originally diagnosed at [facility]. (Dr. Simpson)          IBS (irritable bowel syndrome) 05/18/2009     Priority: Medium    Allergic rhinitis 05/18/2009     Priority: Medium     GERD (gastroesophageal reflux disease) 01/10/2008     Priority: Medium    Gastroparesis 1994     Priority: Medium      Family History   Problem Relation Age of Onset    Depression Mother     Neurologic Disorder Mother         Migraines, take imitrex injection.  Also in maternal grandmother.      Alcohol/Drug Father     Hypertension Father     Depression Father     Osteoarthritis Father     Cardiovascular Maternal Grandmother     Depression Maternal Grandmother     Hypertension Maternal Grandmother     Alzheimer Disease Maternal Grandmother     Cardiovascular Maternal Grandfather     Hypertension Maternal Grandfather     Depression Maternal Grandfather     Alcohol/Drug Maternal Grandfather     Diabetes Maternal Grandfather     Cardiovascular Paternal Grandmother     Hypertension Paternal Grandmother     Cardiovascular Paternal Grandfather     Hypertension Paternal Grandfather     Glaucoma No family hx of     Macular Degeneration No family hx of       Allergies   Allergen Reactions    Amoxil [Penicillins] Rash     Dad unsure of reaction.    Bee Venom Anaphylaxis    Bioflavonoids Anaphylaxis    Citrus Anaphylaxis     All [place]    Contrast Dye Rash     Contrast Media Ready-Box [facility], 04/09/2014.; Contrast Media Ready-Box [facility] [place], 04/09/2014.  NOTE: this is a contrast media oral with iodine. Premedicate with methylpred standard for IV contrast, request barium contrast for oral contrast.    Iodinated Contrast Media Hives and Rash     Contrast Media Ready-Box [facility] [place], 04/09/2014.; Contrast Media Ready-Box [facility] [place], 04/09/2014.  NOTE: this is a contrast media oral with iodine. Premedicate with methylpred standard for IV contrast, request barium contrast for oral contrast.    Pineapple Anaphylaxis, Difficulty breathing and Rash    Reglan [Metoclopramide] Other (See Comments)     IV dose only, in ER, rapid heart rate.    Ace Inhibitors      Difficulty in breathing and GI upset    Amitiza [Lubiprostone] Nausea and Vomiting     Amoxicillin-Pot Clavulanate     Midazolam Unknown     parent states that when pt takes this medication, she wakes up being very violent .    Midazolam Hcl      Coming out of pelvic exam at age of 5, was kicking and screaming when coming out of the versed.    Other [No Clinical Screening - See Comments]      Bleech/ chest tightness, itchy throat, swollen tongue, hives    Tizanidine Other (See Comments)     Confusion, back pain, photophobia, abdominal pain, shaking, anxious      Adhesive Tape Rash    Azithromycin Hives and Rash    Cephalexin Itching and Rash    Sulfa Antibiotics Rash     Skin scarring      Current Outpatient Medications   Medication Sig Dispense Refill    albuterol (PROAIR HFA/PROVENTIL HFA/VENTOLIN HFA) 108 (90 Base) MCG/ACT inhaler Inhale 2 puffs into the lungs every 6 hours as needed for shortness of breath / dyspnea or wheezing 1 Inhaler 1    amitriptyline (ELAVIL) 25 MG tablet TAKE 1 TABLET BY MOUTH EVERY NIGHT AT BEDTIME 90 tablet 1    apremilast (OTEZLA) 30 MG tablet Take 1 tablet (30 mg) by mouth 2 times daily Hold for signs of infection, and seek medical attention. 180 tablet 3    artificial tears OINT ophthalmic ointment 0.5 inch strip each eye at night 1 Tube 11    azelaic acid (FINACIA) 15 % external gel Once daily to scars, upper chest and small portion of back and spot on belly button. Hold if irritating 50 g 5    benzocaine (AMERICAINE) 20 % external aerosol Apply to perineum four times daily as needed for pain 57 g 3    benzocaine (TOPICALE XTRA) 20 % GEL Apply as needed locally to mouth or nasal ulcers for pain; 4 times daily as needed 30 g 1    betamethasone valerate (VALISONE) 0.1 % cream Apply topically 2 times daily as needed       bisacodyl (DULCOLAX) 5 MG EC tablet Take 2 tablets (10 mg) by mouth daily as needed for constipation 30 tablet 0    citalopram (CELEXA) 20 MG tablet Take 1 tablet (20 mg) by mouth daily 90 tablet 1    EPINEPHrine (EPIPEN 2-EAMON) 0.3 MG/0.3ML injection  Inject 0.3 mLs (0.3 mg) into the muscle as needed for anaphylaxis 0.6 mL 3    etonogestrel (NEXPLANON) 68 MG IMPL Inject 68 mg Subcutaneous      fluocinonide (LIDEX) 0.05 % external gel Apply topically 2 times daily 60 g 11    gabapentin (NEURONTIN) 300 MG capsule Take 1 capsule (300 mg) by mouth every morning 60 capsule 1    hydrocortisone, Perianal, (ANUSOL-HC) 2.5 % cream Place rectally 3 times daily as needed for hemorrhoids 30 g 0    hydroxychloroquine (PLAQUENIL) 200 MG tablet Take 1 tablet (200 mg) by mouth 2 times daily (with meals) Annual Plaquenil toxicity eye screening required. 60 tablet 11    hydrOXYzine HCl (ATARAX) 25 MG tablet TAKE ONE OR TWO TABLETS BY MOUTH EVERY SIX HOURS AS NEEDED      hypromellose (GENTEAL) 0.3 % SOLN 1 drop every hour as needed for dry eyes       lactulose 20 GM/30ML SOLN Take 30 mLs by mouth 3 times daily as needed (for constipation) (Patient not taking: Reported on 2/27/2024) 300 mL 3    lanolin ointment Apply topically every hour as needed for other (sore nipples) 7 g 3    lidocaine (LMX4) 4 % external cream Apply topically once as needed for mild pain 120 g 1    LINZESS 290 MCG capsule TAKE 1 CAPSULE BY MOUTH EVERY DAY IN THE MORNING BEFORE BREAKFAST 90 capsule 0    mometasone (ELOCON) 0.1 % external ointment Apply topically 2 times daily 45 g 11    ondansetron (ZOFRAN ODT) 8 MG ODT tab Take 1 tablet by mouth every 8 hours as needed      polyethylene glycol (MIRALAX/GLYCOLAX) powder Take 1 capful by mouth 3 times daily      Prenatal MV-Min-Fe Fum-FA-DHA (PRENATAL 1 PO)       rizatriptan (MAXALT-MLT) 5 MG ODT DISSOLVE 1 OR 2 TABLETS IN THE MOUTH AS NEEDED FOR HEADACHE AT ONSET OF MIGRAINE, MAY REPEAT IN 2 HOURS AS NEEDED. MAX 30MG IN 24 HOURS AND MAX 5 DAYS PER MONTH      sodium fluoride 1.1 % CREA Apply 1 Application topically daily      sucralfate (CARAFATE) 1 GM/10ML suspension Take 10 mLs (1 g) by mouth 4 times daily (Patient not taking: Reported on 2/1/2024) 1200 mL 2  "   triamcinolone (KENALOG) 0.1 % external ointment Apply twice daily as needed to lesions on the genitals and body. OK to use very sparingly on the face. 454 g 2    vitamin D2 (ERGOCALCIFEROL) 36819 units (1250 mcg) capsule Take 1 capsule (50,000 Units) by mouth every 7 days 12 capsule 0           Physical Exam   /73   Ht 1.6 m (5' 3\")   Wt 64 kg (141 lb)   BMI 24.98 kg/m    GA: NAD, pleasant  HEENT: nl sclera/conj  MSK: can't extend 5th fingers and now new deformity of 4th finger, ulnar deviation of fingers, no major swelling over hands  Skin: no rash  Neuro: non focal  Psych: nl affect      There is currently no information documented on the homunculus. Go to the Rheumatology activity and complete the homunculus joint exam.  Joint Exam 06/05/2024     No joint exam has been documented for this visit          Data   Data     10/28/2022   BEAUCHAMP-28 (ESR) --   BEAUCHAMP-28 (CRP) --   Tender (BEAUCHAMP-28) 2 / 28    Swollen (BEAUCHAMP-28) 0 / 28    Provider Global --   Patient Global --   ESR 17 mm/hr   CRP --     No results found for any visits on 06/05/24.  CBC RESULTS: Recent Labs   Lab Test 09/07/20  1147   WBC 4.1   RBC 5.09   HGB 13.0   HCT 43.0   MCV 85   MCH 25.5*   MCHC 30.2*   RDW 15.0        TSH   Date Value Ref Range Status   09/07/2020 0.77 0.40 - 4.00 mU/L Final   03/21/2019 0.53 0.40 - 4.00 mU/L Final   10/30/2018 1.06 0.40 - 4.00 mU/L Final   11/26/2016 0.87 0.40 - 4.00 mU/L Final     T4 Free   Date Value Ref Range Status   01/31/2007 1.26 0.70 - 1.85 ng/dL Final     Rheumatoid Factor   Date Value Ref Range Status   02/06/2024 <10 <14 IU/mL Final   09/30/2020 <7 <12 IU/mL Final   05/06/2016 <20 <20 IU/mL Final       Reviewed Rheumatology lab flowsheet       Dominga Sosa MD    "

## 2024-06-05 NOTE — PROGRESS NOTES
Virtual Visit Details    Joined the call at 2024, 1:55:22 pm.  Left the call at 2024, 2:07:49 pm.    Originating Location (pt. Location): Home  Distant Location (provider location):  Off-site  Platform used for Video Visit: Magdy    Office Visit Details      Rheumatology Clinic Return Visit Patient     Samara Oropeza MRN# [id number]   YOB: 1995 Age: 28 year old     Date of Visit: 2024   Last seen: 2024       Assessment & Plan    Assessment & Plan   Samara is a 28 year old  female (ADRIAN 21) with Behçet's disease (pathergy, oral/genital ulcers, polyarthralgia) who presents today for RECHECK  .    # Behçet's disease (pathergy, arthralgias, recurrent oral / genital ulcers)  # s/p delivery on 2023 with high-risk pregnancy, complicated by pre-eclampsia,  birth but healthy baby    10/2022:  Mrs. Oropeza arrives for follow-up of her Behçet's disease that is more active at present with recent decrease of her Otezla (60mg --> 30mg) recommended by MFM. Previous discussion with her OB providers had been to decrease to the lowest effective dose, clearly 30mg is not effective as she describes worsened oral ulcers, genital ulcers, arthralgias, abdominal pain, and thrombophlebitis. Although there is not a great deal of evidence for Otezla safety in pregnancy, its mechanism of action would suggest it. Counterbalancing these concerns are what we understand of Behçet's in pregnancy which more often improves but in some cases (~29%) becomes worse, and can flare more often in the 1st trimester and post-lorena period.  She also describes some vision changes, which in the context of Behçet's is conerning. She has not seen Ophthalmology in many years; I will place a referral today.     2023: Stable today, on otezla 30 mg bid, breastfeeding, MFM is ok with breastfeeding, discussed ACR reproductive guideline, it does not recommend otezla during pregnancy and breastfeeding  given lack of data; however Samara remained on it during pregnancy and now breastfeeding as stopping it leads to severe flare of her behcet and benefits of staying on it outweighs risks. Her M provider is aware of staying on otezla and is ok with it during breastfeeding.    9/27/23: Doing well, remains on otezla. Has had some interruptions in doses. Today has back and hand pain, and ankle swelling. Will start celebrex, ok with breastfeeding.     2/1/2024:    Behcet's oral/vaginal ulcers are under fair control on otezla, will continue. But she continues to have active inflammatory arthritis, she failed celebrex. Will add  mg bid; risks were discussed. HCQ is safe in pregnancy and breastfeeding in case of pregnancy in future . Will check labs.      Today 6/5/2024:    Active inflammatory arthritis despite adding HCQ in 2/2024, HCQ causes SE, fingers are deforming. Recommend to add methotrexate; risks were discussed. Was informed that it is not allowed in pregnancy and breastfeeding. She will start after she is done with breastfeeding in 8/2024. She prefers sub q inj over oral to avoid GI SEs. Will continue otezla to control oral/genital ulcers. I reviewed labs, stable.    Plan 6/5/2024:    When you stop breastfeeding, start methotrexate 0.4 ml weekly under skin injection, our pharmacist Natalie (MTM referral) will call you to teach you how to do it    Start folic acid 1 mg a day to help with methotrexate side effects    Stop the hydroxychlorquine    Stay on otezla    Labs one month after start of methotrexate    Avoid pregnancy on methotrexate    Refilled elavil and zofran    Return in person or video in about 3-4 months    The longitudinal plan of care for the diagnosis(es)/condition(s) as documented were addressed during this visit. Due to the added complexity in care, I will continue to support Samara in the subsequent management and with ongoing continuity of care.          Dominga Sosa  MD    Orders Placed This Encounter   Procedures    Erythrocyte sedimentation rate auto    CRP inflammation    ALT    Albumin level    AST    Creatinine    Med Therapy Management Referral    CBC with Platelets & Differential                Medical History   History of Present Illness   Samara Oropeza is a 27 year old female who presents for follow-up of her Behçet's in the context of pregnancy.      Seen Dr. Marilyn Moran Rheumatology in [facility] 10/14:    Original presentation 2014:     Ms Oropeza is a 19 y.o. female who presents with one year of presentation of painful oral ulcers (monthly) once that have worsened with two episodes in the last two months that presented with painful vulvar or vaginal ulcers (2 episodes so far every month) [not sure if they leave scar] as well that timing coincides with menses ([facility] [place]: 8/25/14).   ORAL ULCERS: last two episodes were severe, painful, white base with erythematous halo, that appear and disappear in the matter of 1-2 weeks without, does not bleed and doesn't respond to any treatment used (magic mouthwash), 10-15 in number with the biggest one being dime size.      VAGINAL ULCERS: 2-3 in number between labia majora and minora that occur simultaneously with oral ulcers, painful, non bleeding ulcers that are erythematous, no pus.      FOLLICULITIS: patient reports having spots in legs specially around ankle and knee, but arms, and neck are affected as well. Small lesions that resemble ingrown hair, most are red erythematous elevated lesions, well demarcated, some with liquid that patient refers as pimple like.      SKIN RASHES: welts and red macules with well defined borders that are pruritic and non-ulcerative on chest, arms and back. Benadryl relieves.      ARTHRALGIAS: In descending order of severity: hips, knees, wrists, shoulders, neck, lumbar, fingers.   C/o morning stiffness in hips, back and neck lasting for about until noon.      Ms. Oropeza reports they present in severe  flares and never fully resolve, but do get better. She has seen some swelling and warmth on the affected joints but has not noticed and redness. Has tried Tylenol, ibuprofen, and hydrocodone with not much benefit.      FEVERS: Reports 3-4 sporadic episodes per month of self limited fevers of 38 C that occur during the evenings and associate facial flushing.      HEADACHES: occur daily and are bitemporal and irradiate occipitally, pulsatile in nature, intensity varies from intense to mild, are worsened with movement. On treatment with ibuprofen and somatriptan without any positive results.      VISION CHANGES: Patient reports that suddenly she would only see a gray blotch in her right eyes and would persist like this for a day and a half. Also reports blurriness in her left eye. Presence of pain and burning sensation of eyeball with associated redness. Has changed prescription glasses in the matter of 2 years 3 times. She has had opthalmology exam and was not suggestive of any evidence of uveitis.   She was also evaluated in ED for a dizzy spell.      ABD PAIN W/ INTERMITTENT DIARRHEA AND CONSTIPATION: Patient reports abdominal pain that is intermittent, periods of 7 days without bowel movement and feeling bloated with change of pattern to diarrhea (liquidy without blood nor mucus, non foul smelling). Has taken Bentyl, Zegrid, and rinetidine with mild clinical improvement.  She also reports small red nodules after each needle stick for blood draw.   Neurology saw her back then and was not impressed with her presentation as physical examination was normal. Also MRI brain normal: Findings: No definite hemorrhage, mass affect, midline shift, or ventriculomegaly is noted.There are a few scattered non specific white matter T2 hyperintensities. Axial diffusion weighted images are unremarkable.     The major vascular structures appear patent. There is opacification and severe mucosal thickening in the right maxillary sinus.  The remaining paranasal sinuses, and mastoid air cells are clear. Orbits are unremarkable  She has + HSV-1 IGG. Seen ID. Negative for Herpes simplex virus culture. HSV IGM I/II COMBINATION SENT TO [facility]  RESULTS = <0.91  REF RANGE: <0.91 = NEGATIVE  ID did not think HSV could explain her mouth and genital sores.      CRP within normal limits. HIV negative. CBC within normal limits; UA negative. Creatinine within normal limits   CYNDIE neg.  Antigliadin neg.   ANti TTG neg.   [facility] 2014: Colonoscopy and endoscopy neg; result not available. Not sure if biopsies were done.   Raynaud's phenomenon:  Onset: about 2013  Digits: all fingers  Digital ulcers: no  Color changes: white ---> purple and red  Duration of an attack: About couple of hours per mom  Pain during attack: not clear pain but bruise like feeling  Frequency of attacks: few times a month  Family history of Raynauds: no  GERD: very long time     No hemoptysis.   No miscarriages/ no thrombosis in past.   No family or personal history of psoriasis, ulcerative colitis or chron's disease.   No buttock pain or low back pain or stiffness in AM     Interim history:  Dec 2014- Multiple mouth ulcers and did not eat for 5 days. This coincided with periods. Colchicine 0.6mg PO BID started in Nov 2014.   Prednisone taper in Dec 20mg to 0 in 4 weeks. She also used magic mouthwash. Kenalog cream. After going to the ER, 3 days later her ulcers were better. She thinks it improved after the menstruation stopped.   LMP 3 weeks ago.   COLCHICINE HAS HELPED A LOT REDUCING THE INTENSITY OF MENSTRUATION ASSOCIATED ORAL AND VULVAR ULCERS.      Interim history: 6/15     Since last visit only two episodes of mouth sores- small sized 6   No genital ulcers since last visit.   Colchicine 1.2 mg in AM and 0.6mg in PM keeps her symptoms under control.      Admitted in 5/15:  Admission Diagnoses:  - LUE weakness  - spell  - hx of migraines  - hx of Tourette syndrome  - hx of Behcet's  "disease     Discharge Diagnoses:   - spell likely 2/2 anxiety  - hx of migraines  - hx of Tourette syndrome  - hx of Behcet's disease     CT and MRI brain was normal.      Seeing Dr. Lilliana Miramontes soon as outpatient.      Dr. Garcia did not find any intraocular inflammation.       Interm 10/30/2015  ED for migraine. Continues to see neuro - MRI brain without lesions noted. Saw GI - upper barium normal. May be considering repeat colo. Worsening lesions in mouth and genitals. Has had continuous lesion in mouth x 2 months. 2 genital ulcers. Had fracture of right sesamoid in foot. Continues to have low grade fevers. New folliculitis on chest, legs and arms.      Interim hx: 1/11/2016    Pt states that since last visit she continues to have oral ulcers and has noted more folliculitis-like lesions on her lower extremities.  She states that on her right lower leg she had a red macular spot that has since resolved.  She denies uveitis.  She states that the colchicine initially helped but then stopped working.  She takes 0.6 mg TID.  She stopped taking her prednisone last week as she feels like this hasn't helped.  She hasn't had any episodes of vaginal ulcers.      She saw GI who stated she has gastroparesis.  She is seeing Cardiology later this week for possible syncopal episodes.       Interim history 5/16  Mouth ulcers X 1 last month  Genital ulcers - none since last visit.      Bilateral MCPs pain, knee pain and low back pain +ve increased since last visit  Morning stiffness X 2 hours (increasing)   5-6/10     \"overall myalgia\" has gotten worse    C/o pseudofolliculitis lesion on anterior shins bilaterally worse     Interim history: (7/18/2016)  Patient has just started Humira for 2 doses on 7/1 and 7/15 and reported minimal improvement of her hip pain but otherwise, did not notice anything different.      She reported painful mouth ulcer once a month since last visit but noticed that it has been getting deeper. "   Denied any genital ulcers.     Still has ongoing bilateral MCPs pain, knee pain but this has been intermittent and she did not have any much pain today. She reported 2-3 hours morning stiffness of both hands and pain score of 6/10 at her knees and 8/10 of her hands but currently takes no pain medication except prn ativan which she takes when her muscle is really tight.      The only concern is that she gained weight even though she has not been eating much (eat once a day) and do exercises every day for 1 hour (with video of yoga, pilates). Her mother wonders if she needs to have some labs work up include sex hormone, thyroid, cortisol.     Interval history 9/14/16  Patient was started Humira at the last visit, patient reports worsening of skin ulcers since starting Humira and stopped taking Humura for the past 2 weeks. Patient reports oral and genital ulcers has been stable even before starting Humira and has not had any interval oral/genital ulcers. However she reports recurrent skin ulcers occurring on a daily basis that affects her chest and anterior legs. Patient also has stopped taking prednisone, she reports that she doesn't feel the prednisone helps, her last dose of prednisone was 6+ months ago. Patient also report worsening low back pain that sometimes causes her to limp.     In the interval patient also visited ED on 8/31/16 for left hand pain and what the patient describes as phlebitis, no medication or procedure was done and the patient was discharged with a splint. Patient also reported 1 episode of chest pressure that was associated with dyspnea and numbness of the left arm, she was shopping in a supermarket at the time of onset, and the pressure resolved after she took a nap.     Patient denies any infectious symptoms in the interval, no fever, chills, night sweat, cough, dysuria, denies eye pain or visual changes.     November 4, 2016  Oct - had one genital ulcer  Oral ulcers X 5- around  menstruation time.   Knee pain- chronic on the left side  Dizziness spells - on and off; been to the ER for that in the past.   On and off migraines  July 2013 - s/p MPFL reconstruction plus LRL 7/2013  Morning stiffness in knee (left) X 1 hour     Severe jaw pain and chest pain- patient wondering if this is Tourette's or esophageal spasms. Cardiac workup negative.   Fever      January 13, 2017  Yesterday in ER [facility] with orthostatic syncope from dehydration.   Chest pain on and off still continues. Painful with deep breaths.   Oral ulcers - few minor ones lasting for one week;   One major one in roof of mouth lasting for about one week.   Knee pain +ve - reviewed the recent MRI with her orthopedic surgeon.   On and off migraines  otezla is approved. Still waiting to receive the meds.      March 31, 2017  Otezla current dose 15mg PO BID.   She is tolerating okay at this dose and is willing to increase the dose further up to 30mg BID.   Her joint pains are better on otezla. Knee pain is also better.   Back and neck pain +ve 8/10 when it is worse. Intramuscular botox injections were given on Monday in PMR.   2 minor genital ulcers in the interim- resolved.   Few oral ulcers in the interim- resolved.   Nasal ulcer - resolved.   Migraines on and off.   Nausea with otezla but improving.   Dizziness and blackouts on and off. She fell once and hurt her ankle. Wearing boot in left leg.   Complete ROS negative except for above     May 17, 2017  Tolerating otezla better. Not throwing up anymore. Current dose 20mg in AM and 30mg in PM (2nd week).   Patient thinks that her oral ulcers frequency and severity are improving with otezla. Also no genital ulcers in the interim.   Currently on doxycycline for bronchitis/?pneunomia. 4 more days left.      Joint pain history  2 weeks ago/ dx with pneumonia 1 week ago/  Involved joints: all over  Pain scale: 6.5/10   Wakes the patient from sleep : Yes  Morning stiffness: Yes for 120  minutes  Meds used:otezla,colchicine     Interim history  Since last visit:  1. Infections - Yes/ pneumonia  2. New symptoms/medical problem - Yes/ thinks she may have had a mini-seizure about 10 days ago ; did not seek medical attention; did not reoccur after that. Patient seeing PCP today.  3. Any side effects from Rheum medications -vomiting a lot (resolved)  3. ER visits/Hospitalizations/surgeries - Yes  4. Last PCP visit: has an apt. today     Patient still getting double vision. Floaters present.      Therapist recommended psychiatry evaluation. Patient does not want to see psychaitry. Patient will see PCP today.      August 22, 2017  Have you ever seen a rheumatologist Yes Who You When 5/17/17     NO genital ulcers in the interim.   Mouth ulcers- three in the back of the throat and roof of the mouth last month.      Joint pain history  Onset: doing alittle worse / cut her otezla back to 30mg  Daily due to GI problems  Involved joints: all over her body. Been having more black out episodes, been having more chest pains.also has raised bumps on both legs from the knees down that itch and are painful   Pain scale:  5.5/10     Wakes the patient from sleep : Yes  Morning stiffness:Yes for 120 minutes  Meds used:otezla, colchicine (three pills daily)     Interim history  Since last visit:  1. Infections - Yes stomach issue  2. New symptoms/medical problem - No  3. Any side effects from Rheum medications -nausea from otezla  3. ER visits/Hospitalizations/surgeries - Yes, ER for foot, ankle injury from passing out and fell down the steps. Hit her head another time from passing out. Alcohol poisoning in July 4. Last PCP visit: 6/23/17 September 19, 2017  Have you ever seen a rheumatologist Yes Who You When 8/22/17  Joint pain history  Onset: pt states that she hurts everywhere for 6 days  Involved joints: all joints  Pain scale:  7/10     Wakes the patient from sleep : Yes/ not sleeping at all  Morning  stiffness:Yes for 180 minutes  Meds used:colchicine, otezla, magic mouth wash, lidocaine gel  Last one week: increased joint pain; and genital ulcer  Genital lesion (dimed size) in the last one week  After botox a week ago, she had fever 101 for three days; near syncopes. Back pain; floaters  Chest pain , mouth sores, hand pain, morning pain were all better with otezla 30mg twice daily.     Interim history  Since last visit:  1. Infections - No  2. New symptoms/medical problem - No  3. Any side effects from Rheum medications -nausea from the otezla  3. ER visits/Hospitalizations/surgeries - No  4. Last PCP visit: may 2017      October 18, 2017     Have you ever seen a rheumatologist Yes Who You When 9/19/17  Joint pain history  NO mouth or genital sores in the last 4 weeks.   Medrol dosepak helped with visual symptoms but not joint pains.      Onset: pt states that she is doing better than last time with her behcet's. Today has severe stomach pains, states that she is constipated. Pt had a seizure 2 days ago. Does have a metallic taste in her mouth  Involved joints: hands , neck, shoulders  Pain scale:  9/10 from stomach pain;      Wakes the patient from sleep : Yes  Morning stiffness:Yes for 120 minutes  Meds used:cochicine , otezla 30mg twice daily     Interim history  Since last visit:  1. Infections - No  2. New symptoms/medical problem - Yes/ the cartilage in her chest is inflamed  3. Any side effects from Rheum medications -nausea from the otezla  3. ER visits/Hospitalizations/surgeries - No  4. Last PCP visit: yesterday     January 16, 2018  Have you ever seen a rheumatologist Yes Who You When 10/18/17  Joint pain history  Onset: pt states that she was in the hospital for 5 days before maribell, they told her she had lesions in her brain in the white matter. Had blood work drawn in the ER and they told her her inflammatory markers were elevated. Was seen in the ER last week for vomiting and diarrhea, not  eating. Saw Gastro. On 1/10/18. 1.5 weeks ago she had an endoscopy and colonoscopy. She has been having elbow  And hands and knee pain, loosing use of her hands. Been dropping things. Also states that she has been getting lesions up her nose  Involved joints: see above  Pain scale:  8/10     Wakes the patient from sleep : Yes  Morning stiffness:Yes for 120 minutes  Meds used:colchisine, otezla, magic mouth wash, kenolog cream, lidocaine gel     Interim history  Since last visit:  1. Infections - Yes/ had an infection in her jaw. Having sinus issues  2. New symptoms/medical problem - Yes/ states that she is having problems walking, states that she was bouncing when she was walking  3. Any side effects from Rheum medications -otezla, nausea  3. ER visits/Hospitalizations/surgeries - Yes/ see chart  4. Last PCP visit: November 2017 April 17, 2018  Have you ever seen a rheumatologist Yes Who You When 1/16/18  Joint pain history  Onset: pt states that she is not doing well. She is having increased stomach issues, only eats 2 times in 4 days unable to keep the otezla down due to vomiting . Has sleep study done in 1 week; no genital ulcers since last appointment. 6 small mouth sores since last appointment.   Involved joints: see above   Pain scale:  7/10     Wakes the patient from sleep : Yes  Morning stiffness:Yes for 60 minutes  Meds used:otezla , colchicine, diclofenac gel, magic mouthwash, lidocaine gel      Interim history  Since last visit:  1. Infections - Yes/ had a sinus infection, thinks she is getting sick now  2. New symptoms/medical problem - Yes/ neurology sent pt to cardiology. Has a heart condition but not sure what . She is seeing a ortho. Due to knee pain   3. Any side effects from Rheum medications -nausea/vomiting from the otezla   3. ER visits/Hospitalizations/surgeries - Yes/ seen in ER   4. Last PCP visit: yesterday     July 17, 2018  Have you ever seen a rheumatologist Yes Who You When  4/17/18  Joint pain history  Onset: pt Is here for a follow-up states that she started to get lesions on her legs , face and arm. Hurts all over, lower back is very painful. Right hand and wrist area are numb  Involved joints: see above  Pain scale:  7/10   worse in the morning  Wakes the patient from sleep : Yes/ does not go to sleep till late  Morning stiffness:Yes for 4-5 hours minutes  Meds used:colchicine, otezla has  Not started otezla yet due to extreme nausea      Interim history  Since last visit:  1. Infections - Yes/ had a bacterial infection in her pelvic area was hospitalized  2. New symptoms/medical problem - Yes/ hands are swelling up. Having orthopedic issues. Was having problem with her veins sticking up, and very painful. Has happened multiply times   3. Any side effects from Rheum medications -see above  3. ER visits/Hospitalizations/surgeries - Yes/ hospital and ER for bacterial infection  4. 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 ER said may have blood clot in calf, but when did MRI, stated body may have broken it up on it's own. Elevated D-dimer  Involved joints: Knees, neck, hands  Pain scale:  6.5/10     Wakes the patient from sleep : Yes  Morning stiffness:Yes for 180 minutes  Meds used:hyoscyamine, not taking otezla. Last dose Sep. Patient did not want to take otezla with the antibiotics.      Last major mouth ulcer- aug or Sep  No genital ulcers in the last 6 months.      Interim history  Since last visit:  1. Infections - Yes, UTI's twice a month since last visit  2. New symptoms/medical problem - No  3. Any side effects from Rheum medications -otzela causes nausea  3. ER visits/Hospitalizations/surgeries - Yes, 1/2/19 repaired some ligaments and mass taken out, also joint build up removed from a previous injury  4. Last PCP  visit: 12/5/19 June 12, 2019  Have you ever seen a rheumatologist yes Who you When 1/9/19  Joint pain history  Onset: Patient is here for a follow up. A lot of infections and in and out of the hospital, who wanted her to follow up with Rheumatology again.  Involved joints: knees, legs, back, neck, shoulders, ankles  Pain scale:  6.5/10     Wakes the patient from sleep : Yes  Morning stiffness:Yes for 120 minutes  Meds used:magic mouthwash, colchicine     Interim history  Since last visit:  1. Infections - Yes, staph  2. New symptoms/medical problem - kidney failure  3. Any side effects from Rheum medications -none  3. ER visits/Hospitalizations/surgeries - Yes, 3 hospitalizations, and surgeries,  4. Last PCP visit: 6/11/19        Today 9/30/2020: New to me, establishing care. Flaring off otezla.     816   Reports worsening oral ulcers and joint pain and skin rash.     No vaginal ulcers currently. Last ones were 5 months ago.     Gets oral ulcers monthly, not today, had them last few days ago. They come up as flare up around period time. They self-resolve.     Thinks colcrys is helping but not enough, lost some benefit. No longer has diarrhea from it. the dose is 0.6 mg bid.     Came off otezla last year when she had severe staff infection, there was drug drug interaction with vancomycin. Wants to go back on it.     Skin lesions over chest are clearing up. Has skin lesions over her legs.     She is off of otezla >1 yr. She had daily vomiting and abdominal cramps initially which later resolved after 2 months.      Today, is her last day liz her health insurnaace  .     Reports pain and swelling liz hands. Drops things, lost strenghth. Veins look inflamed. Hands are swollen in AM and before bedtime. AM stiffness is 2 hours. can t tolerate ibuprofen.     Prednnisonne does not help much.     Wants to try eleve.     Had 2 blood clots over L arm, finished xraalto 4 months ago.      Was told to have severe dry eyes, hs  not had eyes checked> 1 yr as was in the hospital with staff infection. systanee nd gentle eyedrops help some, sometimes gets sharp pain and and blurry vision in her eyes from dryness. No h/o uveitis.     has dry mouth, drinks water.     Gets T  F sometimes. It is sporadic.     Lost hair.     Gets intermittent CP. Had several hospital visits and admissions in the [facility] for it. lorazepam helped witch chest spasms, she does not like pain meds.     She was prescribed 0.5 mgq d prn, 10 tbs/monthss.     She gets dry cough, just started using albuterol inhaler, it helps.     No SOB.     Has gastroparesis, IBS  nd h/o severas admission for constiption, colon blockage with impaction and gets bloating. has lost follow up with GI.     She is working with  to get medical assistance to get insurance back by early next year. She filed it again.     Gets botox inj every 3 months nd they help, has to cancel 10/20 botox inj s will not have insurance. they came back yesterday.     last seizure waas last year. still gets black out spells, salts ne was last week.    derm eye gi     FHx: Both parents have RA.  SHx: She was working in a [employer], it was closed because of pandemic. sexually active, not on oral contraceptives. She thinks she had a misccraaaiaage last wk, had n period x2 months then mueller bleeding a lot, dark red and was different from period. Felt something came out that she feels she miscarried, no pos pregnancy test. No smoking. rarely drinks ETOH.  [facility]   thumbs between MCP tender   otezla helped with skin lesions, oral ulcers, joint pain.  colcrys  sjogre aleve biotene voltaren [id number]        8/11/2022: Samara presents for urgent visit to discuss m/o Behcet's flare during pregnancy, she is   She is pregnant 10 wk 3 days with ADRIAN 3/6/2023.  In 2020, had 1st trimester miscarriage.  Has LBP, ankle pain/swelling.   When she found out to be pregnant, stopped otezla and  colcrys but started to flare with Behcet's. Discussed with MFM, back on low dose otezla since last week, only 1 tab a day.    Interval History  10/28/2022  Mrs. Oropeza was last seen 08/11/22. At that time she was advised to continue otezla (1 tablet BID) and remain off colcrys. Since that time, she has instead taken otezla 1 tablet daily. She notes more joint pain in hands, wrists, cervical spine, knees with 4 hours of monring stiffness. Previously <1hr with full strength.     Had a few genital ulcers recently which only lasted a few days. Oral ulcers have been more active of late and are currently healing.     Abdominal pain on L side, pain is always worse after eating. Out of the phase of her pregnancy with morning sickness, at 22w on Monday.     She describes a few fevers at the end of September with hot flashes and cold sweats with Tmax 100. Called OB-GYN who instructed she should go in if persistent, though it resolved within a few days.     More easy bruising - thighs, she is currently on lovenox and baby aspirin. Previous history of clots especially with interventions. She recently had an issue with superficial thrombophlebitis following a blood draw.     Seeing more floaters in her vision. Sometimes tiny and clear, but can also be larger and 'gray', up to 1/4 of her vision. Has not seen Ophthalmology in many years.     More frequent migraines - did a nerve block recently but this has worn off, previous botox injections.     ADRIAN 03/06/23.    1/25/23:    Baby is growing small. Had high BP yesterday, they would monitor it. DBP was 90.    Increasing otezla to bid helped. It helped with ulcers. Has skin breakdown since Daryl, never had it like this over her chest, but still it feels related to Behcet.    Hands and ankles are painful and swoleln, ankles are new but had hand pain with behcet before,. This started fater entering 3rd trimster. Her ADRIAN might be earlier based on baby's growth, induction at 37 wk,  progress at 39. nex twk will have another check.    Still has the neck pain. Still gets eye floaters.    6/21/2023:    She delivered 1 month early due to pre-eclampsia. Had Mg infusion, had bleeding issues, spent a week. Was on BP med till a month ago, now stable. Was induced, had NVD. No blood transfusions needed.    Her baby girl Lashanda was born 2/12/2023, small, slow growth but healthy with no fetal deformities. Had vaginal sores after giving birth. Still gets mouth ulcers, one oral ulcer now, no vaginal ulcers now. Plans to breast feed x 7 months. Does breastfeeding. Saw OB/[facility]N in 4/2023 at [facility]. They are aware she is on otezla.    She was on ASA 81 mg every day during pregnancy.    9/27/23: Doing well, less behcet flares since giving birth. Most recent flare on her chest, now healing. No new oral or vaginal ulcers.     She has been having back pain that shoots down her right leg since June and has been utilizing PT without improvement.     She also has had swelling and pain in both hands and right wrist, as well as both ankles L>R.     She notes missing some doses of otezla due to running out of refills and avoiding taking it without food. She has been eating less because she has been more tired since having her daughter.       2/1/2024:    Has back pain. Had epidural inj one wk ago, has to wait x 2 weeks, if no help to get surgery for bulging disc    Some oral lesions 2wk ago flare with swollen hands, but currently has no oral ulcers.     Hands hurt today with pain level about 4/10. Celebrex did not help much.    No vaginal ulcers    Reports loss of appetite past 5 months    Her baby girl was sick x past 2 weeks, just got better last night, she had RSV. Samara did not get much sleep, did not eat much over past 2 weeks because of this.      Today 6/5/2024:    -active inflammatory arthritis despite adding HCQ last visit. HCQ causes GI upset    -fingers are deforming, new    -not feeling well, has  neck pain, nausea x past few days    -still breastfeeding, no pregnancy plans    -missed otezla x 2 days, got some skin lesions back over chest area; otherwise otezla is managing her behcet's sores well    Review of Systems    A comprehensive ROS was done. Positives are per HPI.    Past Medical History   Past Medical History:   Diagnosis Date    Anemia     Anxiety     Arthritis     Behcet's disease (H)     Cervical adenitis May 2010    Chronic abdominal pain     Constipation, chronic 1994    Fibromyalgia     Gastro-oesophageal reflux disease     Gastroparesis     Irregular heart beat     tachycardia, has had workup    Migraines     Neuromuscular disorder (H)     fibramyalgia    Palpitations     PONV (postoperative nausea and vomiting)     Seizure (H)     Seizures (H)     unknown etiology    Syncope     Tourette's       Past Surgical History:   Procedure Laterality Date    ARTHROSCOPY ANKLE, OPEN REPAIR LIGAMENT, COMBINED Left 9/25/2019    Procedure: LEFT ANKLE ARTHROSCOPY WITH LIGAMENT REPAIR;  Surgeon: Andres Johnson DPM;  Location:  OR    ARTHROSCOPY ANKLE, REPAIR LIGAMENT Left 1/2/2019    Procedure: Ankle arthroscopy and sinus tarsi evacuation, ligament repair, left lower extremity;  Surgeon: Andres Johnson DPM;  Location:  OR    ARTHROSCOPY KNEE WITH PATELLAR REALIGNMENT  7/25/2013    Procedure: ARTHROSCOPY KNEE WITH PATELLAR REALIGNMENT;  Left Knee Arthroscopy, Medial Patellofemoral Ligament Reconstruction with Allograft  ;  Surgeon: Jennifer Acevedo MD;  Location:  OR    COLONOSCOPY  2015    DENTAL SURGERY  1996    Teeth removal    ENDOSCOPY UPPER, COLONOSCOPY, COMBINED  2005     ESOPH/GAS REFLUX TEST W NASAL IMPED >1 HR N/A 2/15/2017    Procedure: ESOPHAGEAL IMPEDENCE FUNCTION TEST WITH 24 HOUR PH GREATER THAN 1 HOUR;  Surgeon: Timothy Matta MD;  Location: [facility]    IR LUMBAR PUNCTURE  1/22/2024    IR PICC PLACEMENT > 5 YRS OF AGE  3/13/2019    IR UPPER EXTREMITY VENOGRAM LEFT   2/25/2020    IRRIGATION AND DEBRIDEMENT FOOT, COMBINED Left 3/12/2019    Procedure: COMBINED IRRIGATION AND DEBRIDEMENT LEFT ANKLE;  Surgeon: Micha Glover MD;  Location: [facility]    IRRIGATION AND DEBRIDEMENT LOWER EXTREMITY, COMBINED Left 5/7/2019    Procedure: 1.  Excision of wound down to and including deep fascia, less than 20 cm2.  2.  Irrigation and debridement, left ankle.;  Surgeon: Andres Johnson DPM;  Location: [facility] OR    PICC INSERTION Left 05/05/2019    4Fr - 45cm (5cm external), Basilic vein, SVC RA junction      Patient Active Problem List    Diagnosis Date Noted    Preeclampsia 02/10/2023     Priority: Medium    Infection of joint of ankle (H) 05/06/2019     Priority: Medium    Cellulitis 03/09/2019     Priority: Medium    Displacement of lumbar intervertebral disc without myelopathy 11/13/2018     Priority: Medium     Overview:   Created by Conversion      Iron deficiency associated with nonfamilial restless legs syndrome 11/13/2018     Priority: Medium    Enthesopathy of hip region 11/13/2018     Priority: Medium     Overview:   Created by Conversion      Tourette's syndrome 11/13/2018     Priority: Medium     Overview:   Created by Conversion      Somatic symptom disorder 06/01/2018     Priority: Medium    Pelvic floor weakness 04/25/2018     Priority: Medium    Spells of decreased attentiveness 12/19/2017     Priority: Medium    Mobile cecum (H28) 11/09/2017     Priority: Medium     Cecum noted in Right lower quadrant on 4/17 CT scan, and in Left upper Quadrant on CT on 11/2017.      Vitamin D deficiency 10/11/2017     Priority: Medium     How low, unknown/not found. On D when tested at 27. Starting cholecalciferol October 2017. Needs recheck.      Convulsions, unspecified convulsion type (H) 10/03/2017     Priority: Medium    Transient alteration of awareness 10/03/2017     Priority: Medium    Chronic pain syndrome 07/27/2017     Priority: Medium    Major depressive disorder,  recurrent episode, moderate (H) 06/27/2017     Priority: Medium    Cervical pain 05/02/2017     Priority: Medium    Acute left ankle pain 03/31/2017     Priority: Medium    Cervical dystonia 03/28/2017     Priority: Medium    PTSD (post-traumatic stress disorder) 01/17/2017     Priority: Medium    Patellofemoral instability 10/20/2016     Priority: Medium    Fibromyalgia 08/04/2016     Priority: Medium    Rheumatoid arthritis of multiple sites without rheumatoid factor (H) 08/04/2016     Priority: Medium    Raynaud's disease without gangrene 08/04/2016     Priority: Medium    Chronic abdominal pain 08/04/2016     Priority: Medium    Palpitations 01/12/2016     Priority: Medium    On colchicine therapy 10/30/2015     Priority: Medium    Spell of shaking 05/06/2015     Priority: Medium    Migraine 02/04/2015     Priority: Medium    Behcet's disease (H) 12/10/2014     Priority: Medium    Headaches due to old head injury 11/12/2013     Priority: Medium    Milk protein intolerance 10/11/2013     Priority: Medium    Intestinal malabsorption 10/11/2013     Priority: Medium    Concussion 02/13/2013     Priority: Medium     Jan 2013, with prolonged recovery- followed by sports med        Knee pain 01/03/2013     Priority: Medium    Generalized anxiety disorder 06/25/2009     Priority: Medium    Tics - Tourette syndrome 05/18/2009     Priority: Medium     Followed by psychotherapy. Symptoms well managed. [facility] ([facility] Simpson)          IBS (irritable bowel syndrome) 05/18/2009     Priority: Medium    Allergic rhinitis 05/18/2009     Priority: Medium    GERD (gastroesophageal reflux disease) 01/10/2008     Priority: Medium    Gastroparesis 1994     Priority: Medium      Family History   Problem Relation Age of Onset    Depression Mother     Neurologic Disorder Mother         Migraines, take imitrex injection.  Also in maternal grandmother.      Alcohol/Drug Father     Hypertension Father      Depression Father     Osteoarthritis Father     Cardiovascular Maternal Grandmother     Depression Maternal Grandmother     Hypertension Maternal Grandmother     Alzheimer Disease Maternal Grandmother     Cardiovascular Maternal Grandfather     Hypertension Maternal Grandfather     Depression Maternal Grandfather     Alcohol/Drug Maternal Grandfather     Diabetes Maternal Grandfather     Cardiovascular Paternal Grandmother     Hypertension Paternal Grandmother     Cardiovascular Paternal Grandfather     Hypertension Paternal Grandfather     Glaucoma No family hx of     Macular Degeneration No family hx of       Allergies   Allergen Reactions    Amoxil [Penicillins] Rash     Dad unsure of reaction.    Bee Venom Anaphylaxis    Bioflavonoids Anaphylaxis    Citrus Anaphylaxis     All [place]    Contrast Dye Rash     Contrast Media Ready-Box [facility] [place] [facility], 04/09/2014.; Contrast Media [facility] [place], 04/09/2014.  NOTE: this is a contrast media oral with iodine. Premedicate with methylpred standard for IV contrast, request barium contrast for oral contrast.    Iodinated Contrast Media Hives and Rash     Contrast Media [facility] [place], 04/09/2014.; Contrast Media [facility] [place], 04/09/2014.  NOTE: this is a contrast media oral with iodine. Premedicate with methylpred standard for IV contrast, request barium contrast for oral contrast.    Pineapple Anaphylaxis, Difficulty breathing and Rash    Reglan [Metoclopramide] Other (See Comments)     IV dose only, in ER, rapid heart rate.    Ace Inhibitors      Difficulty in breathing and GI upset    Amitiza [Lubiprostone] Nausea and Vomiting    Amoxicillin-Pot Clavulanate     Midazolam Unknown     parent states that when pt takes this medication, she wakes up being very violent .    Midazolam Hcl      Coming out of pelvic exam at age of 5, was kicking and screaming when coming out of the versed.    Other [No Clinical Screening - See Comments]      Bleech/ chest tightness, itchy throat,  swollen tongue, hives    Tizanidine Other (See Comments)     Confusion, back pain, photophobia, abdominal pain, shaking, anxious      Adhesive Tape Rash    Azithromycin Hives and Rash    Cephalexin Itching and Rash    Sulfa Antibiotics Rash     Skin scarring      Current Outpatient Medications   Medication Sig Dispense Refill    albuterol (PROAIR HFA/PROVENTIL HFA/VENTOLIN HFA) 108 (90 Base) MCG/ACT inhaler Inhale 2 puffs into the lungs every 6 hours as needed for shortness of breath / dyspnea or wheezing 1 Inhaler 1    amitriptyline (ELAVIL) 25 MG tablet TAKE 1 TABLET BY MOUTH EVERY NIGHT AT BEDTIME 90 tablet 1    apremilast (OTEZLA) 30 MG tablet Take 1 tablet (30 mg) by mouth 2 times daily Hold for signs of infection, and seek medical attention. 180 tablet 3    artificial tears OINT ophthalmic ointment 0.5 inch strip each eye at night 1 Tube 11    azelaic acid (FINACIA) 15 % external gel Once daily to scars, upper chest and small portion of back and spot on belly button. Hold if irritating 50 g 5    benzocaine (AMERICAINE) 20 % external aerosol Apply to perineum four times daily as needed for pain 57 g 3    benzocaine (TOPICALE XTRA) 20 % GEL Apply as needed locally to mouth or nasal ulcers for pain; 4 times daily as needed 30 g 1    betamethasone valerate (VALISONE) 0.1 % cream Apply topically 2 times daily as needed       bisacodyl (DULCOLAX) 5 MG EC tablet Take 2 tablets (10 mg) by mouth daily as needed for constipation 30 tablet 0    citalopram (CELEXA) 20 MG tablet Take 1 tablet (20 mg) by mouth daily 90 tablet 1    EPINEPHrine (EPIPEN 2-EAMON) 0.3 MG/0.3ML injection Inject 0.3 mLs (0.3 mg) into the muscle as needed for anaphylaxis 0.6 mL 3    etonogestrel (NEXPLANON) 68 MG IMPL Inject 68 mg Subcutaneous      fluocinonide (LIDEX) 0.05 % external gel Apply topically 2 times daily 60 g 11    gabapentin (NEURONTIN) 300 MG capsule Take 1 capsule (300 mg) by mouth every morning 60 capsule 1    hydrocortisone,  "Perianal, (ANUSOL-HC) 2.5 % cream Place rectally 3 times daily as needed for hemorrhoids 30 g 0    hydroxychloroquine (PLAQUENIL) 200 MG tablet Take 1 tablet (200 mg) by mouth 2 times daily (with meals) Annual Plaquenil toxicity eye screening required. 60 tablet 11    hydrOXYzine HCl (ATARAX) 25 MG tablet TAKE ONE OR TWO TABLETS BY MOUTH EVERY SIX HOURS AS NEEDED      hypromellose (GENTEAL) 0.3 % SOLN 1 drop every hour as needed for dry eyes       lactulose 20 GM/30ML SOLN Take 30 mLs by mouth 3 times daily as needed (for constipation) (Patient not taking: Reported on 2/27/2024) 300 mL 3    lanolin ointment Apply topically every hour as needed for other (sore nipples) 7 g 3    lidocaine (LMX4) 4 % external cream Apply topically once as needed for mild pain 120 g 1    LINZESS 290 MCG capsule TAKE 1 CAPSULE BY MOUTH EVERY DAY IN THE MORNING BEFORE BREAKFAST 90 capsule 0    mometasone (ELOCON) 0.1 % external ointment Apply topically 2 times daily 45 g 11    ondansetron (ZOFRAN ODT) 8 MG ODT tab Take 1 tablet by mouth every 8 hours as needed      polyethylene glycol (MIRALAX/GLYCOLAX) powder Take 1 capful by mouth 3 times daily      Prenatal MV-Min-Fe Fum-FA-DHA (PRENATAL 1 PO)       rizatriptan (MAXALT-MLT) 5 MG ODT DISSOLVE 1 OR 2 TABLETS IN THE MOUTH AS NEEDED FOR HEADACHE AT ONSET OF MIGRAINE, MAY REPEAT IN 2 HOURS AS NEEDED. MAX 30MG IN 24 HOURS AND MAX 5 DAYS PER MONTH      sodium fluoride 1.1 % CREA Apply 1 Application topically daily      sucralfate (CARAFATE) 1 GM/10ML suspension Take 10 mLs (1 g) by mouth 4 times daily (Patient not taking: Reported on 2/1/2024) 1200 mL 2    triamcinolone (KENALOG) 0.1 % external ointment Apply twice daily as needed to lesions on the genitals and body. OK to use very sparingly on the face. 454 g 2    vitamin D2 (ERGOCALCIFEROL) 99806 units (1250 mcg) capsule Take 1 capsule (50,000 Units) by mouth every 7 days 12 capsule 0           Physical Exam   /73   Ht 1.6 m (5' 3\")  "  Wt 64 kg (141 lb)   BMI 24.98 kg/m    GA: NAD, pleasant  HEENT: nl sclera/conj  MSK: can't extend 5th fingers and now new deformity of 4th finger, ulnar deviation of fingers, no major swelling over hands  Skin: no rash  Neuro: non focal  Psych: nl affect      There is currently no information documented on the homunculus. Go to the Rheumatology activity and complete the homunculus joint exam.  Joint Exam 06/05/2024     No joint exam has been documented for this visit          Data   Data      10/28/2022   BEAUCHAMP-28 (ESR) --   BEAUCHAMP-28 (CRP) --   Tender (BEAUCHAMP-28) 2 / 28    Swollen (BEAUCHAMP-28) 0 / 28    Provider Global --   Patient Global --   ESR 17 mm/hr   CRP --     No results found for any visits on 06/05/24.  CBC RESULTS: Recent Labs   Lab Test 09/07/20  1147   WBC 4.1   RBC 5.09   HGB 13.0   HCT 43.0   MCV 85   MCH 25.5*   MCHC 30.2*   RDW 15.0        TSH   Date Value Ref Range Status   09/07/2020 0.77 0.40 - 4.00 mU/L Final   03/21/2019 0.53 0.40 - 4.00 mU/L Final   10/30/2018 1.06 0.40 - 4.00 mU/L Final   11/26/2016 0.87 0.40 - 4.00 mU/L Final     T4 Free   Date Value Ref Range Status   01/31/2007 1.26 0.70 - 1.85 ng/dL Final     Rheumatoid Factor   Date Value Ref Range Status   02/06/2024 <10 <14 IU/mL Final   09/30/2020 <7 <12 IU/mL Final   05/06/2016 <20 <20 IU/mL Final       Reviewed Rheumatology lab flowsheet

## 2024-06-05 NOTE — NURSING NOTE
Is the patient currently in the state of [place]? YES    Visit mode:VIDEO    If the visit is dropped, the patient can be reconnected by: VIDEO VISIT: Text to cell phone:   Telephone Information:   Mobile [phone]       Will anyone else be joining the visit? NO  (If patient encounters technical issues they should call [phone]    How would you like to obtain your AVS? MyChart    Are changes needed to the allergy or medication list? Pt stated no changes to allergies and Pt stated no med changes    Are refills needed on medications prescribed by this physician? NO    Reason for visit: RECHECK    Niurka KUMARI

## 2024-06-05 NOTE — PATIENT INSTRUCTIONS
When you stop breastfeeding, start methotrexate 0.4 ml weekly under skin injection, our pharmacist Natalie ([facility] referral) will call you to teach you how to do it    Start folic acid 1 mg a day to help with methotrexate side effects    Stop the hydroxychlorquine    Stay on otezla    Labs one month after start of methotrexate    Avoid pregnancy on methotrexate    Refilled elavil and zofran    Return in person or video in about 3-4 months

## 2024-06-10 ENCOUNTER — TELEPHONE (OUTPATIENT)
Dept: RHEUMATOLOGY | Facility: CLINIC | Age: 29
End: 2024-06-10
Payer: COMMERCIAL

## 2024-06-10 NOTE — TELEPHONE ENCOUNTER
Patient confirmed scheduled appointment:  Date: September 18th, 2024  Time: 3:30p  Visit type: Return Rheumatology (Video)  Provider: Dr. Sosa  Location: [facility]  Testing/imaging: NA  Additional notes: Pt relayed info about Rx script that was not sent in. Requested message sent to Dr. Sosa in regards to Rx.

## 2024-06-11 ENCOUNTER — VIRTUAL VISIT (OUTPATIENT)
Dept: RHEUMATOLOGY | Facility: CLINIC | Age: 29
End: 2024-06-11
Attending: INTERNAL MEDICINE
Payer: COMMERCIAL

## 2024-06-11 DIAGNOSIS — K13.79 RECURRENT ORAL ULCERS: ICD-10-CM

## 2024-06-11 DIAGNOSIS — K59.00 CONSTIPATION: ICD-10-CM

## 2024-06-11 DIAGNOSIS — M79.7 FIBROMYALGIA: ICD-10-CM

## 2024-06-11 DIAGNOSIS — M35.2 BEHCET'S DISEASE (H): ICD-10-CM

## 2024-06-11 DIAGNOSIS — Z71.85 VACCINE COUNSELING: ICD-10-CM

## 2024-06-11 DIAGNOSIS — Z78.9 TAKES DIETARY SUPPLEMENTS: ICD-10-CM

## 2024-06-11 DIAGNOSIS — F41.9 ANXIETY: ICD-10-CM

## 2024-06-11 DIAGNOSIS — M19.90 INFLAMMATORY ARTHRITIS: Primary | ICD-10-CM

## 2024-06-11 DIAGNOSIS — M25.50 ARTHRALGIA, UNSPECIFIED JOINT: ICD-10-CM

## 2024-06-11 DIAGNOSIS — G43.909 MIGRAINE WITHOUT STATUS MIGRAINOSUS, NOT INTRACTABLE, UNSPECIFIED MIGRAINE TYPE: ICD-10-CM

## 2024-06-11 NOTE — Clinical Note
2024       RE: Samara Oropeza  [address]  [facility] [address]     Dear Colleague,    Thank you for referring your patient, Samara Oropeza, to the [facility] RHEUMATOLOGY [facility]INIC [place] at [facility]. Please see a copy of my visit note below.    Medication Therapy Management (MTM) Encounter    ASSESSMENT:                            Medication Adherence/Access: {adherencechoices:[id number]}      Behçet's Disease/Arthralgias/Fibromyalgia/Recurrent oral/genital ulcers:   Discussed directions on how to self-administer methotrexate injections and the importance of starting folic acid. Discussed importance of getting routine labs and limiting alcohol intake. Methotrexate does cross into the breastmilk and the patient is actively breastfeeding. Recommend patient fully completes her breastfeeding journey prior to starting methotrexate since it is not recommended to breastfeed while taking methotrexate. Discussed pneumonia vaccine (Prevnar-20). Recommend patient remains up to date on vaccines due to the immunosuppressive nature of methotrexate.    Anxiety:  Stable    Chronic Migraine Headaches:  Stable    Chronic Idiopathic Constipation:  Stable    Shortness of Breath:  Stable    Supplements/Maternal Health/Post- Delivery:  Stable    Dry Eyes:  Stable    General Health:  Stable      PLAN:                            Start your folic acid the day you start your methotrexate injections. You can start taking this earlier.  Fully wean from breastfeeding before starting methotrexate. Limit alcohol use to less than 2 drinks per week while on methotrexate.  Follow up labs in 1 month and then every 3 months after that.  Consider getting a pneumonia (Prevnar-20) vaccine. You will need a prescription sent to your pharmacy before getting it.    Follow-up: {followuptest2:[id number]}    SUBJECTIVE/OBJECTIVE:                           Samara Oropeza is a 28 year old female called for an initial visit. She was referred to me from Dr. Dominga Sosa.      Reason for visit: medication therapy management - new methotrexate start.    Allergies/ADRs: Reviewed in chart  Past Medical History: Reviewed in chart  Tobacco: She reports that she has never smoked. She has never used smokeless tobacco.  Alcohol: 1 beverage every other week    Medication Adherence/Access: no issues reported    Behçet's Disease/Arthralgias/Fibromyalgia/Recurrent oral/genital ulcers  Otezla 30 mg twice daily  Methotrexate injection 10 mg every 7 days - has not started yet  Betamethasone 0.1% topically twice daily as needed  Triamcinolone 0.1% ointment apply topically twice daily as needed  Lidocaine 4% cream topically as needed  Benzocaine 20% gel for mouth ulcers as needed  Folic acid 1 mg once daily - has not started yet  Gabapentin 300 mg once daily    Reports methotrexate is on backorder at her pharmacy.  Patient asked about needing to be done with breastfeeding before starting methotrexate. She also asked about starting folic acid sooner before she starts methotrexate.    Anxiety  Citalopram 20 mg once daily  Amitriptyline 25 mg at bedtime  Hydroxyzine 25 mg as needed    Reports no side effects or issues.    Chronic Migraine Headaches  Botulinum toxin  Rizatriptan 5 mg ODT as needed  Ondansetron as needed    Reports no side effects or issues.    Chronic Idiopathic Constipation  Linzess 290 mcg once daily  Bisacodyl 10 mg daily as needed  MiraLax three times daily    Reports no side effects or issues.    Shortness of Breath  Albuterol inhaler 2 puffs every 6 hours as needed    Supplements/Maternal Health/Post- delivery  Vitamin D2 50,000 units every 7 days  Prenatal gummy multivitamin once daily  Hydrocortisone 2.5% as needed    Reports no side effects or issues.    Dry Eyes  Artificial tears ophthalmic ointment as needed  Genteal solution as needed    Reports no  side effects or issues.    General Health  Epi pen as needed    Reports no side effects or issues.    Today's Vitals: There were no vitals taken for this visit.  ----------------      I spent 21 minutes with this patient today. All changes were made via collaborative practice agreement with Dominga Sosa. A copy of the visit note was provided to the patient's provider(s).    A summary of these recommendations was sent via [employer].    Mariya Rodríguez, PharmD, MPH  PGY1 Pharmacy Resident - [facility]    Telemedicine Visit Details  Type of service:  Telephone visit  Start Time:  11:02 AM  End Time: 11:23 AM     Medication Therapy Recommendations  No medication therapy recommendations to display       Again, thank you for allowing me to participate in the care of your patient.      Sincerely,    STEVE DESIR [facility]

## 2024-06-13 RX ORDER — TRIAMCINOLONE ACETONIDE 40 MG/ML
40 INJECTION, SUSPENSION INTRA-ARTICULAR; INTRAMUSCULAR ONCE
Status: COMPLETED | OUTPATIENT
Start: 2024-06-13 | End: 2024-06-13

## 2024-06-13 RX ORDER — BUPIVACAINE HYDROCHLORIDE 5 MG/ML
10 INJECTION, SOLUTION EPIDURAL; INTRACAUDAL ONCE
Status: COMPLETED | OUTPATIENT
Start: 2024-06-13 | End: 2024-06-13

## 2024-06-13 RX ADMIN — BUPIVACAINE HYDROCHLORIDE 50 MG: 5 INJECTION, SOLUTION EPIDURAL; INTRACAUDAL at 17:24

## 2024-06-13 RX ADMIN — TRIAMCINOLONE ACETONIDE 40 MG: 40 INJECTION, SUSPENSION INTRA-ARTICULAR; INTRAMUSCULAR at 17:24

## 2024-06-20 NOTE — PATIENT INSTRUCTIONS
"Recommendations from today's MTM visit:                                                       Start your folic acid 1 mg once daily the day you start your methotrexate 10 mg (0.4 mL) injections. You can start taking folic acid 1 mg earlier.  Fully wean from breastfeeding before starting methotrexate. Limit alcohol use to less than 2 drinks per week while on methotrexate.  Follow up labs in 1 month and then every 3 months after that.  Consider getting a pneumonia vaccine. You will need a prescription sent to your pharmacy before getting it.    Follow-up: Return in about 3 months (around 9/11/2024) for MTM Pharmacist Visit.    It was great speaking with you today.  I value your experience and would be very thankful for your time in providing feedback in our clinic survey. In the next few days, you may receive an email or text message from [employer] with a link to a survey related to your  clinical pharmacist.\"     To schedule another MTM appointment, please call the clinic directly or you may call the MTM scheduling line at [phone].    My Clinical Pharmacist's contact information:                                                      Please feel free to contact me with any questions or concerns you have.      Naatlie Leach, PharmD  Medication Therapy Management Pharmacist  [facility]  Phone: [phone]     "

## 2024-06-23 ENCOUNTER — HEALTH MAINTENANCE LETTER (OUTPATIENT)
Age: 29
End: 2024-06-23

## 2024-08-09 ENCOUNTER — TELEPHONE (OUTPATIENT)
Dept: RHEUMATOLOGY | Facility: CLINIC | Age: 29
End: 2024-08-09
Payer: COMMERCIAL

## 2024-08-09 NOTE — TELEPHONE ENCOUNTER
Patient confirmed scheduled appointment:  Date: August 15th, 2024  Time: 8:30a  Visit type: Return Rheumatology  Provider: Dr. Sosa  Location: [facility]  Testing/imaging: NA  Additional notes: Appt slot approved per Dr. Sosa

## 2024-08-20 ENCOUNTER — OFFICE VISIT (OUTPATIENT)
Dept: PHYSICAL MEDICINE AND REHAB | Facility: CLINIC | Age: 29
End: 2024-08-20
Payer: COMMERCIAL

## 2024-08-20 VITALS — SYSTOLIC BLOOD PRESSURE: 114 MMHG | HEART RATE: 96 BPM | DIASTOLIC BLOOD PRESSURE: 74 MMHG

## 2024-08-20 DIAGNOSIS — G43.719 CHRONIC MIGRAINE WITHOUT AURA, INTRACTABLE, WITHOUT STATUS MIGRAINOSUS: Primary | ICD-10-CM

## 2024-08-20 DIAGNOSIS — M79.18 MYOFASCIAL PAIN: ICD-10-CM

## 2024-08-20 DIAGNOSIS — M54.81 BILATERAL OCCIPITAL NEURALGIA: ICD-10-CM

## 2024-08-20 PROCEDURE — 95874 GUIDE NERV DESTR NEEDLE EMG: CPT | Performed by: PHYSICAL MEDICINE & REHABILITATION

## 2024-08-20 PROCEDURE — 64615 CHEMODENERV MUSC MIGRAINE: CPT | Mod: XS | Performed by: PHYSICAL MEDICINE & REHABILITATION

## 2024-08-20 PROCEDURE — 64405 NJX AA&/STRD GR OCPL NRV: CPT | Mod: 50 | Performed by: PHYSICAL MEDICINE & REHABILITATION

## 2024-08-20 PROCEDURE — 20552 NJX 1/MLT TRIGGER POINT 1/2: CPT | Performed by: PHYSICAL MEDICINE & REHABILITATION

## 2024-08-20 RX ORDER — BUPIVACAINE HYDROCHLORIDE 5 MG/ML
10 INJECTION, SOLUTION PERINEURAL ONCE
Status: COMPLETED | OUTPATIENT
Start: 2024-08-20 | End: 2024-08-20

## 2024-08-20 RX ORDER — TRIAMCINOLONE ACETONIDE 40 MG/ML
40 INJECTION, SUSPENSION INTRA-ARTICULAR; INTRAMUSCULAR ONCE
Status: COMPLETED | OUTPATIENT
Start: 2024-08-20 | End: 2024-08-20

## 2024-08-20 RX ADMIN — BUPIVACAINE HYDROCHLORIDE 50 MG: 5 INJECTION, SOLUTION PERINEURAL at 16:01

## 2024-08-20 RX ADMIN — TRIAMCINOLONE ACETONIDE 40 MG: 40 INJECTION, SUSPENSION INTRA-ARTICULAR; INTRAMUSCULAR at 16:02

## 2024-08-20 NOTE — NURSING NOTE
Chief Complaint   Patient presents with    Procedure     BOTOX     Lidia Gutiérrez MA on 8/20/2024 at 2:05 PM

## 2024-08-20 NOTE — LETTER
8/20/2024      Samara Oropeza  [address]  [facility] [address]      Dear Colleague,    Thank you for referring your patient, Samara Oropeza, to the [facility]. Please see a copy of my visit note below.      [facility]    PM&R CLINIC NOTE  BOTULINUM TOXIN PROCEDURE      HPI  Chief Complaint   Patient presents with     Procedure     BOTOX     Samara Oropeza is a 28 year old female who presents to clinic for botulinum toxin injections for management of chronic migraine headaches.     SINCE LAST VISIT  Samara Oropeza was last seen here in clinic on 5/21/2024, at which time she received 200 units of Botox.     Patient denies new medical diagnoses, illnesses, hospitalizations, emergency room visits, and injuries since the previous injection with botulinum neurotoxin. She continues physical therapy for her lower back since she had surgery on 4/30/2024.     RESPONSE TO PREVIOUS TREATMENT    Side effects: No problems reported    1.  Headache frequency during this injection cycle: She reports approximately 12 milder headache days per month, with one full month with no migraine headaches. This is compared to her baseline headache frequency of 30 headache days per month.     2.  Headache duration during this injection cycle:  Headache duration was usually a few hours to a few days . Patient reports a few episodes of multiple day headaches during this injection cycle, particularly as the Botox was wearing off.     3.  Headache intensity during this injection cycle:    A.  7/10  =  Typical pain level.  B.  9/10  =  Worst pain level - this has only occurred over the last two weeks as Botox has been wearing off.   C.  2/10  =  Lowest pain level.    4.  Change in headache medication usage during this injection cycle:  (For Example:  Able to decrease use of oral pain medications.) She has been taking Tylenol and ibuprofen as  needed for her migraine headaches. She will take rizatriptan if the OTC medications do not work. She has taken more rizatriptan this injection cycle due to more headaches.     5.  ER Visits During This Injection Cycle:  None.     6.  Functional Performance:  Change in ADL's, social interaction, days lost from work, etc. Patient reports being able to more fully participate in social and family activities and responsibilities as headache symptoms have improved.      PHYSICAL EXAM  VS: /74 (BP Location: Right arm, Patient Position: Sitting)   Pulse 96    GEN: Pleasant and cooperative, in no acute distress  HEENT: No facial asymmetry    ALLERGIES  Allergies   Allergen Reactions     Amoxil [Penicillins] Rash     Dad unsure of reaction.     Bee Venom Anaphylaxis     Bioflavonoids Anaphylaxis     Citrus Anaphylaxis     All Wythe     Contrast Dye Rash     Contrast Media [place] [facility] [place] [place].  NOTE: this is a contrast media oral with iodine. Premedicate with methylpred standard for IV contrast, request barium contrast for oral contrast.     Iodinated Contrast Media Hives and Rash     [facility]rast [facility] [place].; Contrast [facility] [place].  NOTE: this is a contrast media oral with iodine. Premedicate with methylpred standard for IV contrast, request barium contrast for oral contrast.     Pineapple Anaphylaxis, Difficulty breathing and Rash     Reglan [Metoclopramide] Other (See Comments)     IV dose only, in ER, rapid heart rate.     Ace Inhibitors      Difficulty in breathing and GI upset     Amitiza [Lubiprostone] Nausea and Vomiting     Amoxicillin-Pot Clavulanate      Midazolam Unknown     parent states that when pt takes this medication, she wakes up being very violent .     Midazolam Hcl      Coming out of pelvic exam at age of 5, was kicking and screaming when coming out of the versed.     Other [No Clinical Screening - See  Comments]      Bleech/ chest tightness, itchy throat, swollen tongue, hives     Tizanidine Other (See Comments)     Confusion, back pain, photophobia, abdominal pain, shaking, anxious       Adhesive Tape Rash     Azithromycin Hives and Rash     Cephalexin Itching and Rash     Sulfa Antibiotics Rash     Skin scarring       CURRENT MEDICATIONS    Current Outpatient Medications:      albuterol (PROAIR HFA/PROVENTIL HFA/VENTOLIN HFA) 108 (90 Base) MCG/ACT inhaler, Inhale 2 puffs into the lungs every 6 hours as needed for shortness of breath / dyspnea or wheezing, Disp: 1 Inhaler, Rfl: 1     amitriptyline (ELAVIL) 25 MG tablet, Take 1 tablet (25 mg) by mouth at bedtime, Disp: 90 tablet, Rfl: 1     apremilast (OTEZLA) 30 MG tablet, Take 1 tablet (30 mg) by mouth 2 times daily Hold for signs of infection, and seek medical attention., Disp: 180 tablet, Rfl: 3     artificial tears OINT ophthalmic ointment, 0.5 inch strip each eye at night, Disp: 1 Tube, Rfl: 11     azelaic acid (FINACIA) 15 % external gel, Once daily to scars, upper chest and small portion of back and spot on belly button. Hold if irritating, Disp: 50 g, Rfl: 5     benzocaine (AMERICAINE) 20 % external aerosol, Apply to perineum four times daily as needed for pain, Disp: 57 g, Rfl: 3     benzocaine (TOPICALE XTRA) 20 % GEL, Apply as needed locally to mouth or nasal ulcers for pain; 4 times daily as needed, Disp: 30 g, Rfl: 1     betamethasone valerate (VALISONE) 0.1 % cream, Apply topically 2 times daily as needed , Disp: , Rfl:      bisacodyl (DULCOLAX) 5 MG EC tablet, Take 2 tablets (10 mg) by mouth daily as needed for constipation, Disp: 30 tablet, Rfl: 0     citalopram (CELEXA) 20 MG tablet, Take 1 tablet (20 mg) by mouth daily, Disp: 90 tablet, Rfl: 1     EPINEPHrine (EPIPEN 2-EAMON) 0.3 MG/0.3ML injection, Inject 0.3 mLs (0.3 mg) into the muscle as needed for anaphylaxis, Disp: 0.6 mL, Rfl: 3     etonogestrel (NEXPLANON) 68 MG IMPL, Inject 68 mg  Subcutaneous, Disp: , Rfl:      fluocinonide (LIDEX) 0.05 % external gel, Apply topically 2 times daily, Disp: 60 g, Rfl: 11     folic acid (FOLVITE) 1 MG tablet, Take 1 tablet (1 mg) by mouth daily, Disp: 90 tablet, Rfl: 3     gabapentin (NEURONTIN) 300 MG capsule, Take 1 capsule (300 mg) by mouth every morning, Disp: 60 capsule, Rfl: 1     hydrocortisone, Perianal, (ANUSOL-HC) 2.5 % cream, Place rectally 3 times daily as needed for hemorrhoids, Disp: 30 g, Rfl: 0     hydrOXYzine HCl (ATARAX) 25 MG tablet, TAKE ONE OR TWO TABLETS BY MOUTH EVERY SIX HOURS AS NEEDED, Disp: , Rfl:      hypromellose (GENTEAL) 0.3 % SOLN, 1 drop every hour as needed for dry eyes , Disp: , Rfl:      lactulose 20 GM/30ML SOLN, Take 30 mLs by mouth 3 times daily as needed (for constipation), Disp: 300 mL, Rfl: 3     lanolin ointment, Apply topically every hour as needed for other (sore nipples), Disp: 7 g, Rfl: 3     lidocaine (LMX4) 4 % external cream, Apply topically once as needed for mild pain, Disp: 120 g, Rfl: 1     LINZESS 290 MCG capsule, TAKE 1 CAPSULE BY MOUTH EVERY DAY IN THE MORNING BEFORE BREAKFAST, Disp: 90 capsule, Rfl: 0     methotrexate sodium, PF, 50 MG/2ML SOLN injection, Inject 0.4 mLs (10 mg) Subcutaneous every 7 days, Disp: 10 mL, Rfl: 1     mometasone (ELOCON) 0.1 % external ointment, Apply topically 2 times daily, Disp: 45 g, Rfl: 11     ondansetron (ZOFRAN ODT) 8 MG ODT tab, Take 1 tablet (8 mg) by mouth every 8 hours as needed, Disp: 90 tablet, Rfl: 3     polyethylene glycol (MIRALAX/GLYCOLAX) powder, Take 1 capful by mouth 3 times daily, Disp: , Rfl:      Prenatal MV-Min-Fe Fum-FA-DHA (PRENATAL 1 PO), , Disp: , Rfl:      rizatriptan (MAXALT-MLT) 5 MG ODT, DISSOLVE 1 OR 2 TABLETS IN THE MOUTH AS NEEDED FOR HEADACHE AT ONSET OF MIGRAINE, MAY REPEAT IN 2 HOURS AS NEEDED. MAX 30MG IN 24 HOURS AND MAX 5 DAYS PER MONTH, Disp: , Rfl:      Sharps Container MISC, Use for methotreaxte shots, Disp: 1 each, Rfl: 11      "sodium fluoride 1.1 % CREA, Apply 1 Application topically daily, Disp: , Rfl:      sucralfate (CARAFATE) 1 GM/10ML suspension, Take 10 mLs (1 g) by mouth 4 times daily, Disp: 1200 mL, Rfl: 2     syringe/needle, sisp, 25G X 5/8\" 1 ML MISC, Inject 1 Syringe Subcutaneous every 7 days Use with Methotrexate, Disp: 100 each, Rfl: 3     triamcinolone (KENALOG) 0.1 % external ointment, Apply twice daily as needed to lesions on the genitals and body. OK to use very sparingly on the face., Disp: 454 g, Rfl: 2     vitamin D2 (ERGOCALCIFEROL) 21993 units (1250 mcg) capsule, Take 1 capsule (50,000 Units) by mouth every 7 days, Disp: 12 capsule, Rfl: 0    Current Facility-Administered Medications:      botulinum toxin type A (BOTOX) 100 units injection 200 Units, 200 Units, Intramuscular, Q90 Days, Alejandrina Hernandez MD     botulinum toxin type A (BOTOX) 100 units injection 200 Units, 200 Units, Intramuscular, Q90 Days, Alejandrina Hernandez MD, 200 Units at 24 1446     triamcinolone (KENALOG-40) injection 40 mg, 40 mg, , , Andres Johnson, DPM, 40 mg at 10/11/21 0928     triamcinolone (KENALOG-40) injection 40 mg, 40 mg, , , Andres Johnson, DPM, 40 mg at 06/15/21 0957     triamcinolone (KENALOG-40) injection 40 mg, 40 mg, , , FleischJohn lakhaniemy, DPM, 40 mg at 09/15/20 1554       BOTULINUM NEUROTOXIN INJECTION PROCEDURES    VERIFICATION OF PATIENT IDENTIFICATION AND PROCEDURE     Initials   Patient Name SES   Patient  SES   Procedure Verified by: SES     Prior to the start of the procedure and with procedural staff participation, I verbally confirmed the patient s identity using two indicators, relevant allergies, that the procedure was appropriate and matched the consent or emergent situation, and that the correct equipment/implants were available. Immediately prior to starting the procedure I conducted the Time Out with the procedural staff and re-confirmed the patient s name, procedure, and " site/side. (The Joint Commission universal protocol was followed.)  Yes    Sedation (Moderate or Deep): None    ABOVE ASSESSMENTS PERFORMED BY    Alejandrina Hernandez MD    INDICATIONS FOR PROCEDURES  Samara Oropeza is a 28 year old patient with pain secondary to the diagnosis of chronic migraine headaches. Her baseline symptoms have been recalcitrant to oral medications and conservative therapy.  She is here today for reinjection with Botox.    GOAL OF PROCEDURE  The goal of this procedure is to decrease pain.      TOTAL DOSE ADMINISTERED  Dose Administered:  200 units  Botox (Botulinum Toxin Type A)       2:1 Dilution   Unavoidable Drug Waste: No.  Diluent Used:  Preservative Free Normal Saline  Total Volume of Diluent Used:  4 ml  NDC #: Botox 100u ([id number])      CONSENT  The risks, benefits, and treatment options were discussed with Samara Oropeza and she agreed to proceed.    Written consent was obtained by  [facility] .     EQUIPMENT USED  Needle-25mm stimulating/recording  Needle-30 gauge  EMG/NCS Machine    SKIN PREPARATION  Skin preparation was performed using an alcohol wipe.    GUIDANCE DESCRIPTION  Electro-myographic guidance was necessary throughout the neck portion of the procedure to accurately identify all areas of spastic muscles while avoiding injection of non-spastic muscles, neighboring nerves and nearby vascular structures.       AREA/MUSCLE INJECTED:  200 UNITS BOTOX = TOTAL DOSE, 2:1 DILUTION     1. SHOULDER GIRDLE & NECK MUSCLES: 60 UNITS BOTOX = TOTAL DOSE     Right Splenius Cervicis - 5 units of Botox over 2 site/s.   Left Splenius Cervicis - 5 units of Botox over 2 site/s.     Right Rectus Capitis - 2.5 units of Botox at 1 site.  Left Rectus Capitis - 2.5 units of Botox at 1site.     Right Levator Scapulae - 10 units of Botox over 2 site/s.   Left Levator Scapulae - 10 units of Botox over 2 site/s.    Right Anterior Scalene - 2.5 units of Botox over 1 site/s.   Left Anterior Scalene  - 2.5 units of Botox over 1 site/s.     Right Sternocleidomastoid - 2.5 units of Botox over 1 site/s.   Left Sternocleidomastoid - 2.5 units of Botox over 1 site/s.     Right Rhomboid Major - 5 units of Botox over 1 site/s.    Left Rhomboid Major - 5 units of Botox over 1 site/s.      Right Pectoralis Minor - 2.5 units of Botox over 1 site/s.    Left Pectoralis Minor - 2.5 units of Botox over 1 site/s.     2. FACIAL & SCALP MUSCLES: 140 UNITS BOTOX = TOTAL DOSE     Right Occipitalis - 5 units of Botox over 1 site/s.   Left Occipitalis - 5 units of Botox over 1 site/s.     Right Frontalis - 10 units of Botox over 2 site/s.  Left Frontalis - 10 units of Botox over 2 site/s.     Right Temporalis - 50 units of Botox over 8 site/s.  Left Temporalis - 50 units of Botox over 8 site/s.     Right  - 2.5 units of Botox over 1 site/s.              Left  - 2.5 units of Botox over 1 site/s.                 Procerus - 5 units of Botox at 1 site/s.      BILATERAL GREATER & LESSER OCCIPITAL NERVE BLOCKS, TRIGGER POINT INJECTIONS  1% lidocaine: 2 ml  0.5% bupivacaine: 10 ml  [place] ml = 40 mg      ONB: Area just inferior to insertion of the right and left superior trapezius insertion onto skull was cleansed with ChloraPrep. Needle was advanced anteriorly to base of skull then slightly withdrawn and injectate was injected in a fan-like distribution at different depths. An 8 ml mixture of 1% lidocaine, and methylprednisolone was divided into two 5 ml syringes. Total injection volume = 4 ml per side.     TPI: Areas of the skin were prepped with ChloraPrep.  Using standard precautions, a 30-gauge 1-inch needle was used to inject a 3 ml mixture of 1% lidocaine and 0.5% bupivacaine into the upper trapezius and rhomboid major/minor muscles bilaterally for a total of 8 sites.         RESPONSE TO PROCEDURE  Samara Oropeza tolerated the procedure well and there were no immediate complications. She was allowed to  recover for an appropriate period of time and was discharged home in stable condition.      ASSESSMENT AND PLAN   Botulinum toxin injections: No changes made to Botox dose or distribution today. Occipital nerve blocks and trigger point injections also administered per patient request to address occipital neuralgia and myofascial pain. Patient will continue to monitor response to Botox and report at next appointment.   Referrals: None.   Follow up: Samara Oropeza was rescheduled for the next series of injections in 12 weeks, at which time we will evaluate response to today's injections. she may call the clinic prior with any questions or concerns prior to the next appointment.           Again, thank you for allowing me to participate in the care of your patient.        Sincerely,        Alejandrina Hernandez MD

## 2024-08-20 NOTE — PROGRESS NOTES
[facility]    PM&R CLINIC NOTE  BOTULINUM TOXIN PROCEDURE      HPI  Chief Complaint   Patient presents with    Procedure     BOTOX     Samara Oropeza is a 28 year old female who presents to clinic for botulinum toxin injections for management of chronic migraine headaches.     SINCE LAST VISIT  Samara Oropeza was last seen here in clinic on 5/21/2024, at which time she received 200 units of Botox.     Patient denies new medical diagnoses, illnesses, hospitalizations, emergency room visits, and injuries since the previous injection with botulinum neurotoxin. She continues physical therapy for her lower back since she had surgery on 4/30/2024.     RESPONSE TO PREVIOUS TREATMENT    Side effects: No problems reported    1.  Headache frequency during this injection cycle: She reports approximately 12 milder headache days per month, with one full month with no migraine headaches. This is compared to her baseline headache frequency of 30 headache days per month.     2.  Headache duration during this injection cycle:  Headache duration was usually a few hours to a few days . Patient reports a few episodes of multiple day headaches during this injection cycle, particularly as the Botox was wearing off.     3.  Headache intensity during this injection cycle:    A.  7/10  =  Typical pain level.  B.  9/10  =  Worst pain level - this has only occurred over the last two weeks as Botox has been wearing off.   C.  2/10  =  Lowest pain level.    4.  Change in headache medication usage during this injection cycle:  (For Example:  Able to decrease use of oral pain medications.) She has been taking Tylenol and ibuprofen as needed for her migraine headaches. She will take rizatriptan if the OTC medications do not work. She has taken more rizatriptan this injection cycle due to more headaches.     5.  ER Visits During This Injection Cycle:  None.     6.  Functional Performance:  Change in ADL's,  social interaction, days lost from work, etc. Patient reports being able to more fully participate in social and family activities and responsibilities as headache symptoms have improved.      PHYSICAL EXAM  VS: /74 (BP Location: Right arm, Patient Position: Sitting)   Pulse 96    GEN: Pleasant and cooperative, in no acute distress  HEENT: No facial asymmetry    ALLERGIES  Allergies   Allergen Reactions    Amoxil [Penicillins] Rash     Dad unsure of reaction.    Bee Venom Anaphylaxis    Bioflavonoids Anaphylaxis    Citrus Anaphylaxis     All Graves    Contrast Dye Rash     [place] [facility] [place] Contrast Media [place] [place].  NOTE: this is a contrast media oral with iodine. Premedicate with methylpred standard for IV contrast, request barium contrast for oral contrast.    Iodinated Contrast Media Hives and Rash     Contrast Media [facility] [place].; Contrast Media [facility] [place].  NOTE: this is a contrast media oral with iodine. Premedicate with methylpred standard for IV contrast, request barium contrast for oral contrast.    Pineapple Anaphylaxis, Difficulty breathing and Rash    Reglan [Metoclopramide] Other (See Comments)     IV dose only, in ER, rapid heart rate.    Ace Inhibitors      Difficulty in breathing and GI upset    Amitiza [Lubiprostone] Nausea and Vomiting    Amoxicillin-Pot Clavulanate     Midazolam Unknown     parent states that when pt takes this medication, she wakes up being very violent .    Midazolam Hcl      Coming out of pelvic exam at age of 5, was kicking and screaming when coming out of the versed.    Other [No Clinical Screening - See Comments]      Bleech/ chest tightness, itchy throat, swollen tongue, hives    Tizanidine Other (See Comments)     Confusion, back pain, photophobia, abdominal pain, shaking, anxious      Adhesive Tape Rash    Azithromycin Hives and Rash    Cephalexin Itching and Rash    Sulfa Antibiotics Rash      Skin scarring       CURRENT MEDICATIONS    Current Outpatient Medications:     albuterol (PROAIR HFA/PROVENTIL HFA/VENTOLIN HFA) 108 (90 Base) MCG/ACT inhaler, Inhale 2 puffs into the lungs every 6 hours as needed for shortness of breath / dyspnea or wheezing, Disp: 1 Inhaler, Rfl: 1    amitriptyline (ELAVIL) 25 MG tablet, Take 1 tablet (25 mg) by mouth at bedtime, Disp: 90 tablet, Rfl: 1    apremilast (OTEZLA) 30 MG tablet, Take 1 tablet (30 mg) by mouth 2 times daily Hold for signs of infection, and seek medical attention., Disp: 180 tablet, Rfl: 3    artificial tears OINT ophthalmic ointment, 0.5 inch strip each eye at night, Disp: 1 Tube, Rfl: 11    azelaic acid (FINACIA) 15 % external gel, Once daily to scars, upper chest and small portion of back and spot on belly button. Hold if irritating, Disp: 50 g, Rfl: 5    benzocaine (AMERICAINE) 20 % external aerosol, Apply to perineum four times daily as needed for pain, Disp: 57 g, Rfl: 3    benzocaine (TOPICALE XTRA) 20 % GEL, Apply as needed locally to mouth or nasal ulcers for pain; 4 times daily as needed, Disp: 30 g, Rfl: 1    betamethasone valerate (VALISONE) 0.1 % cream, Apply topically 2 times daily as needed , Disp: , Rfl:     bisacodyl (DULCOLAX) 5 MG EC tablet, Take 2 tablets (10 mg) by mouth daily as needed for constipation, Disp: 30 tablet, Rfl: 0    citalopram (CELEXA) 20 MG tablet, Take 1 tablet (20 mg) by mouth daily, Disp: 90 tablet, Rfl: 1    EPINEPHrine (EPIPEN 2-EAMON) 0.3 MG/0.3ML injection, Inject 0.3 mLs (0.3 mg) into the muscle as needed for anaphylaxis, Disp: 0.6 mL, Rfl: 3    etonogestrel (NEXPLANON) 68 MG IMPL, Inject 68 mg Subcutaneous, Disp: , Rfl:     fluocinonide (LIDEX) 0.05 % external gel, Apply topically 2 times daily, Disp: 60 g, Rfl: 11    folic acid (FOLVITE) 1 MG tablet, Take 1 tablet (1 mg) by mouth daily, Disp: 90 tablet, Rfl: 3    gabapentin (NEURONTIN) 300 MG capsule, Take 1 capsule (300 mg) by mouth every morning, Disp: 60  "capsule, Rfl: 1    hydrocortisone, Perianal, (ANUSOL-HC) 2.5 % cream, Place rectally 3 times daily as needed for hemorrhoids, Disp: 30 g, Rfl: 0    hydrOXYzine HCl (ATARAX) 25 MG tablet, TAKE ONE OR TWO TABLETS BY MOUTH EVERY SIX HOURS AS NEEDED, Disp: , Rfl:     hypromellose (GENTEAL) 0.3 % SOLN, 1 drop every hour as needed for dry eyes , Disp: , Rfl:     lactulose 20 GM/30ML SOLN, Take 30 mLs by mouth 3 times daily as needed (for constipation), Disp: 300 mL, Rfl: 3    lanolin ointment, Apply topically every hour as needed for other (sore nipples), Disp: 7 g, Rfl: 3    lidocaine (LMX4) 4 % external cream, Apply topically once as needed for mild pain, Disp: 120 g, Rfl: 1    LINZESS 290 MCG capsule, TAKE 1 CAPSULE BY MOUTH EVERY DAY IN THE MORNING BEFORE BREAKFAST, Disp: 90 capsule, Rfl: 0    methotrexate sodium, PF, 50 MG/2ML SOLN injection, Inject 0.4 mLs (10 mg) Subcutaneous every 7 days, Disp: 10 mL, Rfl: 1    mometasone (ELOCON) 0.1 % external ointment, Apply topically 2 times daily, Disp: 45 g, Rfl: 11    ondansetron (ZOFRAN ODT) 8 MG ODT tab, Take 1 tablet (8 mg) by mouth every 8 hours as needed, Disp: 90 tablet, Rfl: 3    polyethylene glycol (MIRALAX/GLYCOLAX) powder, Take 1 capful by mouth 3 times daily, Disp: , Rfl:     Prenatal MV-Min-Fe Fum-FA-DHA (PRENATAL 1 PO), , Disp: , Rfl:     rizatriptan (MAXALT-MLT) 5 MG ODT, DISSOLVE 1 OR 2 TABLETS IN THE MOUTH AS NEEDED FOR HEADACHE AT ONSET OF MIGRAINE, MAY REPEAT IN 2 HOURS AS NEEDED. MAX 30MG IN 24 HOURS AND MAX 5 DAYS PER MONTH, Disp: , Rfl:     Sharps Container MISC, Use for methotreaxte shots, Disp: 1 each, Rfl: 11    sodium fluoride 1.1 % CREA, Apply 1 Application topically daily, Disp: , Rfl:     sucralfate (CARAFATE) 1 GM/10ML suspension, Take 10 mLs (1 g) by mouth 4 times daily, Disp: 1200 mL, Rfl: 2    syringe/needle, sisp, 25G X 5/8\" 1 ML MISC, Inject 1 Syringe Subcutaneous every 7 days Use with Methotrexate, Disp: 100 each, Rfl: 3    triamcinolone " (KENALOG) 0.1 % external ointment, Apply twice daily as needed to lesions on the genitals and body. OK to use very sparingly on the face., Disp: 454 g, Rfl: 2    vitamin D2 (ERGOCALCIFEROL) 46145 units (1250 mcg) capsule, Take 1 capsule (50,000 Units) by mouth every 7 days, Disp: 12 capsule, Rfl: 0    Current Facility-Administered Medications:     botulinum toxin type A (BOTOX) 100 units injection 200 Units, 200 Units, Intramuscular, Q90 Days, Alejandrina Hernandez MD    botulinum toxin type A (BOTOX) 100 units injection 200 Units, 200 Units, Intramuscular, Q90 Days, Alejandrina Hernandez MD, 200 Units at 24 1446    triamcinolone (KENALOG-40) injection 40 mg, 40 mg, , , Fleischlesvia, Andres, DPM, 40 mg at 10/11/21 0928    triamcinolone (KENALOG-40) injection 40 mg, 40 mg, , , Fleischmann, Andres, DPM, 40 mg at 06/15/21 0957    triamcinolone (KENALOG-40) injection 40 mg, 40 mg, , , Fleischmann, Andres, DPM, 40 mg at 09/15/20 1554       BOTULINUM NEUROTOXIN INJECTION PROCEDURES    VERIFICATION OF PATIENT IDENTIFICATION AND PROCEDURE     Initials   Patient Name SES   Patient  SES   Procedure Verified by: SES     Prior to the start of the procedure and with procedural staff participation, I verbally confirmed the patient s identity using two indicators, relevant allergies, that the procedure was appropriate and matched the consent or emergent situation, and that the correct equipment/implants were available. Immediately prior to starting the procedure I conducted the Time Out with the procedural staff and re-confirmed the patient s name, procedure, and site/side. (The Joint Commission universal protocol was followed.)  Yes    Sedation (Moderate or Deep): None    ABOVE ASSESSMENTS PERFORMED BY    Alejandrina Hernandez MD    INDICATIONS FOR PROCEDURES  Samara Oropeza is a 28 year old patient with pain secondary to the diagnosis of chronic migraine headaches. Her baseline symptoms have been recalcitrant to  oral medications and conservative therapy.  She is here today for reinjection with Botox.    GOAL OF PROCEDURE  The goal of this procedure is to decrease pain.      TOTAL DOSE ADMINISTERED  Dose Administered:  200 units  Botox (Botulinum Toxin Type A)       2:1 Dilution   Unavoidable Drug Waste: No.  Diluent Used:  Preservative Free Normal Saline  Total Volume of Diluent Used:  4 ml  NDC #: Botox 100u ([address]-14)      CONSENT  The risks, benefits, and treatment options were discussed with Samara Oropeza and she agreed to proceed.    Written consent was obtained by  [facility] .     EQUIPMENT USED  Needle-25mm stimulating/recording  Needle-30 gauge  EMG/NCS Machine    SKIN PREPARATION  Skin preparation was performed using an alcohol wipe.    GUIDANCE DESCRIPTION  Electro-myographic guidance was necessary throughout the neck portion of the procedure to accurately identify all areas of spastic muscles while avoiding injection of non-spastic muscles, neighboring nerves and nearby vascular structures.       AREA/MUSCLE INJECTED:  200 UNITS BOTOX = TOTAL DOSE, 2:1 DILUTION     1. SHOULDER GIRDLE & NECK MUSCLES: 60 UNITS BOTOX = TOTAL DOSE     Right Splenius Cervicis - 5 units of Botox over 2 site/s.   Left Splenius Cervicis - 5 units of Botox over 2 site/s.     Right Rectus Capitis - 2.5 units of Botox at 1 site.  Left Rectus Capitis - 2.5 units of Botox at 1site.     Right Levator Scapulae - 10 units of Botox over 2 site/s.   Left Levator Scapulae - 10 units of Botox over 2 site/s.    Right Anterior Scalene - 2.5 units of Botox over 1 site/s.   Left Anterior Scalene - 2.5 units of Botox over 1 site/s.     Right Sternocleidomastoid - 2.5 units of Botox over 1 site/s.   Left Sternocleidomastoid - 2.5 units of Botox over 1 site/s.     Right Rhomboid Major - 5 units of Botox over 1 site/s.    Left Rhomboid Major - 5 units of Botox over 1 site/s.      Right Pectoralis Minor - 2.5 units of Botox over 1 site/s.    Left  Pectoralis Minor - 2.5 units of Botox over 1 site/s.     2. FACIAL & SCALP MUSCLES: 140 UNITS BOTOX = TOTAL DOSE     Right Occipitalis - 5 units of Botox over 1 site/s.   Left Occipitalis - 5 units of Botox over 1 site/s.     Right Frontalis - 10 units of Botox over 2 site/s.  Left Frontalis - 10 units of Botox over 2 site/s.     Right Temporalis - 50 units of Botox over 8 site/s.  Left Temporalis - 50 units of Botox over 8 site/s.     Right  - 2.5 units of Botox over 1 site/s.              Left  - 2.5 units of Botox over 1 site/s.                 Procerus - 5 units of Botox at 1 site/s.      BILATERAL GREATER & LESSER OCCIPITAL NERVE BLOCKS, TRIGGER POINT INJECTIONS  1% lidocaine: 2 ml  0.5% bupivacaine: 10 ml  [place] ml = 40 mg      ONB: Area just inferior to insertion of the right and left superior trapezius insertion onto skull was cleansed with ChloraPrep. Needle was advanced anteriorly to base of skull then slightly withdrawn and injectate was injected in a fan-like distribution at different depths. An 8 ml mixture of 1% lidocaine, and methylprednisolone was divided into two 5 ml syringes. Total injection volume = 4 ml per side.     TPI: Areas of the skin were prepped with ChloraPrep.  Using standard precautions, a 30-gauge 1-inch needle was used to inject a 3 ml mixture of 1% lidocaine and 0.5% bupivacaine into the upper trapezius and rhomboid major/minor muscles bilaterally for a total of 8 sites.         RESPONSE TO PROCEDURE  Samara Oropeza tolerated the procedure well and there were no immediate complications. She was allowed to recover for an appropriate period of time and was discharged home in stable condition.      ASSESSMENT AND PLAN   Botulinum toxin injections: No changes made to Botox dose or distribution today. Occipital nerve blocks and trigger point injections also administered per patient request to address occipital neuralgia and myofascial pain. Patient will  continue to monitor response to Botox and report at next appointment.   Referrals: None.   Follow up: Samara Oropeza was rescheduled for the next series of injections in 12 weeks, at which time we will evaluate response to today's injections. she may call the clinic prior with any questions or concerns prior to the next appointment.

## 2024-08-27 ENCOUNTER — OFFICE VISIT (OUTPATIENT)
Dept: DERMATOLOGY | Facility: CLINIC | Age: 29
End: 2024-08-27
Payer: COMMERCIAL

## 2024-08-27 DIAGNOSIS — L81.0 POSTINFLAMMATORY HYPERPIGMENTATION: Primary | ICD-10-CM

## 2024-08-27 DIAGNOSIS — M35.2 BEHCET'S SYNDROME (H): ICD-10-CM

## 2024-08-27 PROCEDURE — 99214 OFFICE O/P EST MOD 30 MIN: CPT | Performed by: PHYSICIAN ASSISTANT

## 2024-08-27 RX ORDER — FLUOCINONIDE GEL 0.5 MG/G
GEL TOPICAL 2 TIMES DAILY
Qty: 60 G | Refills: 11 | Status: SHIPPED | OUTPATIENT
Start: 2024-08-27

## 2024-08-27 RX ORDER — HYDROQUINONE 40 MG/G
CREAM TOPICAL
Qty: 28 G | Refills: 1 | Status: SHIPPED | OUTPATIENT
Start: 2024-08-27

## 2024-08-27 RX ORDER — MOMETASONE FUROATE 1 MG/G
OINTMENT TOPICAL 2 TIMES DAILY
Qty: 45 G | Refills: 11 | Status: SHIPPED | OUTPATIENT
Start: 2024-08-27

## 2024-08-27 ASSESSMENT — PAIN SCALES - GENERAL: PAINLEVEL: NO PAIN (0)

## 2024-08-27 NOTE — NURSING NOTE
Dermatology Rooming Note    Samara Oropeza's goals for this visit include:   Chief Complaint   Patient presents with    Derm Problem     Behcet's syndrome - Samara states she has been getting sores on her chest and has a constant itch on her legs.      Arcadio PITTS CMA

## 2024-08-27 NOTE — PROGRESS NOTES
[facility] Dermatology Note  Encounter Date: Aug 27, 2024  Office Visit     Reviewed patients past medical history and pertinent chart review prior to patients visit today.     Dermatology Problem List:  1. Behcet's syndrome   - Diagnosed 2014, primarily managed by rheumatology  - Periodic painful oral/genital (scarring) ulcers, folliculitis, hx positive pathergy test, prior success with apremilast  - Current: apremilast 30 mg BID per rheum, methotrexate 10 mg upon cessation of breastfeeding (not started), mometasone ointment, fluocinonide gel, lidocaine patches, benzocaine gel  - Prior: colchicine (holding for pregnancy), adalimumab (ineffective), triamcinolone paste for oral lesions, triamcinolone 0.1% oint BID PRN for genital/body lesions  - Future: certolizumab    ____________________________________________    Assessment & Plan:     # Behcet's syndrome    For flares on trunk and extremities:   - mometasone 0.1% ointment twice daily for 2 weeks. Restart if rash flares again in the future. We reviewed potential side effects, including skin atrophy.   - OTC lidocaine 4% gel or patches  For flares in the mouth:  - fluocinonide 0.05% gel twice daily for two weeks.   - benzocaine 20% gel as needed     # Postinflammatory hyperpigmentation  - The patient expressed interest in topical treatment. I prescribed hydroquinone 4% to be applied to the dark spots daily for 3 months. Stop for 1 month. If further lightening is desired, apply once daily for 3 additional months. We discussed potential side effects, including darkening and irritation.   - If topicals are ineffective or not tolerated I recommend consultation with cosmetic dermatology to determine if the patient is a candidate for resurfacing laser or chemical peels.    Follow up in 6 months with Dr. Tracey.     All risks, benefits and alternatives were discussed with patient.  Patient is in agreement and understands the assessment and plan.  All  questions were answered.  TATI Rodriguez [facility] Dermatology  _______________________________________    CC: Derm Problem (Behcet's syndrome - Samara states she has been getting sores on her chest and has a constant itch on her legs. )    HPI:  Ms. Samara Oropeza is a(n) 28 year old female who presents today as a return patient for Behcet's syndrome. The patient last met with her rheumatology team in June, they planned on starting methotrexate 10 mg when the patient discontinued breastfeeding. She has noticed some flaring on her chest, legs, and inside her mouth in recent weeks. She has noticed some scarring and hyperpigmentation in areas where the lesions resolve. She has used a lightening cream in the past and would like a new prescription for this. Patient is otherwise feeling well, without additional skin concerns.    Physical Exam:  SKIN: Focused examination of mouth, chest, and shins was performed.  - Buccal and gingival membranes, tongue, and lips are without ulceration.   - Few hyperpigmented patches involving the bilateral lateral breasts and shins.     - No other lesions of concern on areas examined.     Medications:  Current Outpatient Medications   Medication Sig Dispense Refill    albuterol (PROAIR HFA/PROVENTIL HFA/VENTOLIN HFA) 108 (90 Base) MCG/ACT inhaler Inhale 2 puffs into the lungs every 6 hours as needed for shortness of breath / dyspnea or wheezing 1 Inhaler 1    amitriptyline (ELAVIL) 25 MG tablet Take 1 tablet (25 mg) by mouth at bedtime 90 tablet 1    apremilast (OTEZLA) 30 MG tablet Take 1 tablet (30 mg) by mouth 2 times daily Hold for signs of infection, and seek medical attention. 180 tablet 3    artificial tears OINT ophthalmic ointment 0.5 inch strip each eye at night 1 Tube 11    azelaic acid (FINACIA) 15 % external gel Once daily to scars, upper chest and small portion of back and spot on belly button. Hold if irritating 50 g 5    benzocaine (AMERICAINE) 20 %  external aerosol Apply to perineum four times daily as needed for pain 57 g 3    benzocaine (TOPICALE XTRA) 20 % GEL Apply as needed locally to mouth or nasal ulcers for pain; 4 times daily as needed 30 g 1    betamethasone valerate (VALISONE) 0.1 % cream Apply topically 2 times daily as needed       bisacodyl (DULCOLAX) 5 MG EC tablet Take 2 tablets (10 mg) by mouth daily as needed for constipation 30 tablet 0    citalopram (CELEXA) 20 MG tablet Take 1 tablet (20 mg) by mouth daily 90 tablet 1    EPINEPHrine (EPIPEN 2-EAMON) 0.3 MG/0.3ML injection Inject 0.3 mLs (0.3 mg) into the muscle as needed for anaphylaxis 0.6 mL 3    etonogestrel (NEXPLANON) 68 MG IMPL Inject 68 mg Subcutaneous      fluocinonide (LIDEX) 0.05 % external gel Apply topically 2 times daily 60 g 11    folic acid (FOLVITE) 1 MG tablet Take 1 tablet (1 mg) by mouth daily 90 tablet 3    gabapentin (NEURONTIN) 300 MG capsule Take 1 capsule (300 mg) by mouth every morning 60 capsule 1    hydrocortisone, Perianal, (ANUSOL-HC) 2.5 % cream Place rectally 3 times daily as needed for hemorrhoids 30 g 0    hydrOXYzine HCl (ATARAX) 25 MG tablet TAKE ONE OR TWO TABLETS BY MOUTH EVERY SIX HOURS AS NEEDED      hypromellose (GENTEAL) 0.3 % SOLN 1 drop every hour as needed for dry eyes       lactulose 20 GM/30ML SOLN Take 30 mLs by mouth 3 times daily as needed (for constipation) 300 mL 3    lanolin ointment Apply topically every hour as needed for other (sore nipples) 7 g 3    lidocaine (LMX4) 4 % external cream Apply topically once as needed for mild pain 120 g 1    LINZESS 290 MCG capsule TAKE 1 CAPSULE BY MOUTH EVERY DAY IN THE MORNING BEFORE BREAKFAST 90 capsule 0    methotrexate sodium, PF, 50 MG/2ML SOLN injection Inject 0.4 mLs (10 mg) Subcutaneous every 7 days 10 mL 1    mometasone (ELOCON) 0.1 % external ointment Apply topically 2 times daily 45 g 11    ondansetron (ZOFRAN ODT) 8 MG ODT tab Take 1 tablet (8 mg) by mouth every 8 hours as needed 90 tablet 3  "   polyethylene glycol (MIRALAX/GLYCOLAX) powder Take 1 capful by mouth 3 times daily      Prenatal MV-Min-Fe Fum-FA-DHA (PRENATAL 1 PO)       rizatriptan (MAXALT-MLT) 5 MG ODT DISSOLVE 1 OR 2 TABLETS IN THE MOUTH AS NEEDED FOR HEADACHE AT ONSET OF MIGRAINE, MAY REPEAT IN 2 HOURS AS NEEDED. MAX 30MG IN 24 HOURS AND MAX 5 DAYS PER MONTH      Sharps Container MISC Use for methotreaxte shots 1 each 11    sodium fluoride 1.1 % CREA Apply 1 Application topically daily      sucralfate (CARAFATE) 1 GM/10ML suspension Take 10 mLs (1 g) by mouth 4 times daily 1200 mL 2    syringe/needle, sisp, 25G X 5/8\" 1 ML MISC Inject 1 Syringe Subcutaneous every 7 days Use with Methotrexate 100 each 3    triamcinolone (KENALOG) 0.1 % external ointment Apply twice daily as needed to lesions on the genitals and body. OK to use very sparingly on the face. 454 g 2    vitamin D2 (ERGOCALCIFEROL) 62063 units (1250 mcg) capsule Take 1 capsule (50,000 Units) by mouth every 7 days (Patient not taking: Reported on 8/27/2024) 12 capsule 0     Current Facility-Administered Medications   Medication Dose Route Frequency Provider Last Rate Last Admin    botulinum toxin type A (BOTOX) 100 units injection 200 Units  200 Units Intramuscular Q90 Days Alejandrina Hernandez MD        botulinum toxin type A (BOTOX) 100 units injection 200 Units  200 Units Intramuscular Q90 Days Alejandrina Hernandez MD   200 Units at 08/20/24 1535    triamcinolone (KENALOG-40) injection 40 mg  40 mg   FleAnrdes james, DPM   40 mg at 10/11/21 0928    triamcinolone (KENALOG-40) injection 40 mg  40 mg   FlejosemannAndres, DPM   40 mg at 06/15/21 0957    triamcinolone (KENALOG-40) injection 40 mg  40 mg   Fleischmann, Andres, DPM   40 mg at 09/15/20 1554      Past Medical History:   Patient Active Problem List   Diagnosis    Tics - Tourette syndrome    IBS (irritable bowel syndrome)    Allergic rhinitis    Generalized anxiety disorder    Knee pain    Concussion    " Milk protein intolerance    Headaches due to old head injury    Behcet's disease (H)    Migraine    Spell of shaking    On colchicine therapy    Palpitations    Fibromyalgia    Rheumatoid arthritis of multiple sites without rheumatoid factor (H)    Raynaud's disease without gangrene    Chronic abdominal pain    Patellofemoral instability    PTSD (post-traumatic stress disorder)    Cervical dystonia    Acute left ankle pain    Cervical pain    Major depressive disorder, recurrent episode, moderate (H)    Chronic pain syndrome    Convulsions, unspecified convulsion type (H)    Transient alteration of awareness    Vitamin D deficiency    Mobile cecum (H28)    Spells of decreased attentiveness    Pelvic floor weakness    Somatic symptom disorder    Gastroparesis    GERD (gastroesophageal reflux disease)    Displacement of lumbar intervertebral disc without myelopathy    Intestinal malabsorption    Iron deficiency associated with nonfamilial restless legs syndrome    Enthesopathy of hip region    Tourette's syndrome    Cellulitis    Infection of joint of ankle (H)    Preeclampsia     Past Medical History:   Diagnosis Date    Anemia     Anxiety     Arthritis     Behcet's disease (H)     Cervical adenitis May 2010    Chronic abdominal pain     Constipation, chronic 1994    Fibromyalgia     Gastro-oesophageal reflux disease     Gastroparesis     Irregular heart beat     tachycardia, has had workup    Migraines     Neuromuscular disorder (H)     fibramyalgia    Palpitations     PONV (postoperative nausea and vomiting)     Seizure (H)     Seizures (H)     unknown etiology    Syncope     Tourette's        CC Referred Self, MD  No address on file on close of this encounter.

## 2024-08-27 NOTE — LETTER
8/27/2024       RE: Samara Oropeza  [address]  [facility] [address]     Dear Colleague,    Thank you for referring your patient, Samara Oropeza, to the [facility] [place] at [facility]. Please see a copy of my visit note below.    [facility] Dermatology Note  Encounter Date: Aug 27, 2024  Office Visit     Reviewed patients past medical history and pertinent chart review prior to patients visit today.     Dermatology Problem List:  1. Behcet's syndrome   - Diagnosed 2014, primarily managed by rheumatology  - Periodic painful oral/genital (scarring) ulcers, folliculitis, hx positive pathergy test, prior success with apremilast  - Current: apremilast 30 mg BID per rheum, methotrexate 10 mg upon cessation of breastfeeding (not started), mometasone ointment, fluocinonide gel, lidocaine patches, benzocaine gel  - Prior: colchicine (holding for pregnancy), adalimumab (ineffective), triamcinolone paste for oral lesions, triamcinolone 0.1% oint BID PRN for genital/body lesions  - Future: certolizumab    ____________________________________________    Assessment & Plan:     # Behcet's syndrome    For flares on trunk and extremities:   - mometasone 0.1% ointment twice daily for 2 weeks. Restart if rash flares again in the future. We reviewed potential side effects, including skin atrophy.   - OTC lidocaine 4% gel or patches  For flares in the mouth:  - fluocinonide 0.05% gel twice daily for two weeks.   - benzocaine 20% gel as needed     # Postinflammatory hyperpigmentation  - The patient expressed interest in topical treatment. I prescribed hydroquinone 4% to be applied to the dark spots daily for 3 months. Stop for 1 month. If further lightening is desired, apply once daily for 3 additional months. We discussed potential side effects, including darkening and irritation.   - If topicals are  ineffective or not tolerated I recommend consultation with cosmetic dermatology to determine if the patient is a candidate for resurfacing laser or chemical peels.    Follow up in 6 months with Dr. Tracey.     All risks, benefits and alternatives were discussed with patient.  Patient is in agreement and understands the assessment and plan.  All questions were answered.  Jody Dupont PA-C  [facility] Dermat[facility]  _______________________________________    CC: Derm Problem (Behcet's syndrome - Samara states she has been getting sores on her chest and has a constant itch on her legs. )    HPI:  Ms. Samara Oropeza is a(n) 28 year old female who presents today as a return patient for Behcet's syndrome. The patient last met with her rheumatology team in June, they planned on starting methotrexate 10 mg when the patient discontinued breastfeeding. She has noticed some flaring on her chest, legs, and inside her mouth in recent weeks. She has noticed some scarring and hyperpigmentation in areas where the lesions resolve. She has used a lightening cream in the past and would like a new prescription for this. Patient is otherwise feeling well, without additional skin concerns.    Physical Exam:  SKIN: Focused examination of mouth, chest, and shins was performed.  - Buccal and gingival membranes, tongue, and lips are without ulceration.   - Few hyperpigmented patches involving the bilateral lateral breasts and shins.     - No other lesions of concern on areas examined.     Medications:  Current Outpatient Medications   Medication Sig Dispense Refill     albuterol (PROAIR HFA/PROVENTIL HFA/VENTOLIN HFA) 108 (90 Base) MCG/ACT inhaler Inhale 2 puffs into the lungs every 6 hours as needed for shortness of breath / dyspnea or wheezing 1 Inhaler 1     amitriptyline (ELAVIL) 25 MG tablet Take 1 tablet (25 mg) by mouth at bedtime 90 tablet 1     apremilast (OTEZLA) 30 MG tablet Take 1 tablet (30 mg) by mouth 2 times  daily Hold for signs of infection, and seek medical attention. 180 tablet 3     artificial tears OINT ophthalmic ointment 0.5 inch strip each eye at night 1 Tube 11     azelaic acid (FINACIA) 15 % external gel Once daily to scars, upper chest and small portion of back and spot on belly button. Hold if irritating 50 g 5     benzocaine (AMERICAINE) 20 % external aerosol Apply to perineum four times daily as needed for pain 57 g 3     benzocaine (TOPICALE XTRA) 20 % GEL Apply as needed locally to mouth or nasal ulcers for pain; 4 times daily as needed 30 g 1     betamethasone valerate (VALISONE) 0.1 % cream Apply topically 2 times daily as needed        bisacodyl (DULCOLAX) 5 MG EC tablet Take 2 tablets (10 mg) by mouth daily as needed for constipation 30 tablet 0     citalopram (CELEXA) 20 MG tablet Take 1 tablet (20 mg) by mouth daily 90 tablet 1     EPINEPHrine (EPIPEN 2-EAMON) 0.3 MG/0.3ML injection Inject 0.3 mLs (0.3 mg) into the muscle as needed for anaphylaxis 0.6 mL 3     etonogestrel (NEXPLANON) 68 MG IMPL Inject 68 mg Subcutaneous       fluocinonide (LIDEX) 0.05 % external gel Apply topically 2 times daily 60 g 11     folic acid (FOLVITE) 1 MG tablet Take 1 tablet (1 mg) by mouth daily 90 tablet 3     gabapentin (NEURONTIN) 300 MG capsule Take 1 capsule (300 mg) by mouth every morning 60 capsule 1     hydrocortisone, Perianal, (ANUSOL-HC) 2.5 % cream Place rectally 3 times daily as needed for hemorrhoids 30 g 0     hydrOXYzine HCl (ATARAX) 25 MG tablet TAKE ONE OR TWO TABLETS BY MOUTH EVERY SIX HOURS AS NEEDED       hypromellose (GENTEAL) 0.3 % SOLN 1 drop every hour as needed for dry eyes        lactulose 20 GM/30ML SOLN Take 30 mLs by mouth 3 times daily as needed (for constipation) 300 mL 3     lanolin ointment Apply topically every hour as needed for other (sore nipples) 7 g 3     lidocaine (LMX4) 4 % external cream Apply topically once as needed for mild pain 120 g 1     LINZESS 290 MCG capsule TAKE 1  "CAPSULE BY MOUTH EVERY DAY IN THE MORNING BEFORE BREAKFAST 90 capsule 0     methotrexate sodium, PF, 50 MG/2ML SOLN injection Inject 0.4 mLs (10 mg) Subcutaneous every 7 days 10 mL 1     mometasone (ELOCON) 0.1 % external ointment Apply topically 2 times daily 45 g 11     ondansetron (ZOFRAN ODT) 8 MG ODT tab Take 1 tablet (8 mg) by mouth every 8 hours as needed 90 tablet 3     polyethylene glycol (MIRALAX/GLYCOLAX) powder Take 1 capful by mouth 3 times daily       Prenatal MV-Min-Fe Fum-FA-DHA (PRENATAL 1 PO)        rizatriptan (MAXALT-MLT) 5 MG ODT DISSOLVE 1 OR 2 TABLETS IN THE MOUTH AS NEEDED FOR HEADACHE AT ONSET OF MIGRAINE, MAY REPEAT IN 2 HOURS AS NEEDED. MAX 30MG IN 24 HOURS AND MAX 5 DAYS PER MONTH       Sharps Container MISC Use for methotreaxte shots 1 each 11     sodium fluoride 1.1 % CREA Apply 1 Application topically daily       sucralfate (CARAFATE) 1 GM/10ML suspension Take 10 mLs (1 g) by mouth 4 times daily 1200 mL 2     syringe/needle, sisp, 25G X 5/8\" 1 ML MISC Inject 1 Syringe Subcutaneous every 7 days Use with Methotrexate 100 each 3     triamcinolone (KENALOG) 0.1 % external ointment Apply twice daily as needed to lesions on the genitals and body. OK to use very sparingly on the face. 454 g 2     vitamin D2 (ERGOCALCIFEROL) 27589 units (1250 mcg) capsule Take 1 capsule (50,000 Units) by mouth every 7 days (Patient not taking: Reported on 8/27/2024) 12 capsule 0     Current Facility-Administered Medications   Medication Dose Route Frequency Provider Last Rate Last Admin     botulinum toxin type A (BOTOX) 100 units injection 200 Units  200 Units Intramuscular Q90 Days Alejandrina Hernandez MD         botulinum toxin type A (BOTOX) 100 units injection 200 Units  200 Units Intramuscular Q90 Days Alejandrina Hernandez MD   200 Units at 08/20/24 1535     triamcinolone (KENALOG-40) injection 40 mg  40 mg   Andres Johnson DPM   40 mg at 10/11/21 0928     triamcinolone (KENALOG-40) " injection 40 mg  40 mg   Andres Johnson, DPM   40 mg at 06/15/21 0957     triamcinolone (KENALOG-40) injection 40 mg  40 mg   Andres Johnson, DPM   40 mg at 09/15/20 7434      Past Medical History:   Patient Active Problem List   Diagnosis     Tics - Tourette syndrome     IBS (irritable bowel syndrome)     Allergic rhinitis     Generalized anxiety disorder     Knee pain     Concussion     Milk protein intolerance     Headaches due to old head injury     Behcet's disease (H)     Migraine     Spell of shaking     On colchicine therapy     Palpitations     Fibromyalgia     Rheumatoid arthritis of multiple sites without rheumatoid factor (H)     Raynaud's disease without gangrene     Chronic abdominal pain     Patellofemoral instability     PTSD (post-traumatic stress disorder)     Cervical dystonia     Acute left ankle pain     Cervical pain     Major depressive disorder, recurrent episode, moderate (H)     Chronic pain syndrome     Convulsions, unspecified convulsion type (H)     Transient alteration of awareness     Vitamin D deficiency     Mobile cecum (H28)     Spells of decreased attentiveness     Pelvic floor weakness     Somatic symptom disorder     Gastroparesis     GERD (gastroesophageal reflux disease)     Displacement of lumbar intervertebral disc without myelopathy     Intestinal malabsorption     Iron deficiency associated with nonfamilial restless legs syndrome     Enthesopathy of hip region     Tourette's syndrome     Cellulitis     Infection of joint of ankle (H)     Preeclampsia     Past Medical History:   Diagnosis Date     Anemia      Anxiety      Arthritis      Behcet's disease (H)      Cervical adenitis May 2010     Chronic abdominal pain      Constipation, chronic 1994     Fibromyalgia      Gastro-oesophageal reflux disease      Gastroparesis      Irregular heart beat     tachycardia, has had workup     Migraines      Neuromuscular disorder (H)     fibramyalgia     Palpitations      PONV  (postoperative nausea and vomiting)      Seizure (H)      Seizures (H)     unknown etiology     Syncope      Tourette's        CC Referred Self, MD  No address on file on close of this encounter.       Again, thank you for allowing me to participate in the care of your patient.      Sincerely,    Jody Dupont PA-C

## 2024-09-18 ENCOUNTER — VIRTUAL VISIT (OUTPATIENT)
Dept: RHEUMATOLOGY | Facility: CLINIC | Age: 29
End: 2024-09-18
Attending: INTERNAL MEDICINE
Payer: COMMERCIAL

## 2024-09-18 VITALS — HEIGHT: 63 IN | WEIGHT: 139 LBS | BODY MASS INDEX: 24.63 KG/M2

## 2024-09-18 DIAGNOSIS — M19.90 INFLAMMATORY ARTHRITIS: Primary | ICD-10-CM

## 2024-09-18 DIAGNOSIS — Z51.81 MEDICATION MONITORING ENCOUNTER: ICD-10-CM

## 2024-09-18 DIAGNOSIS — L29.9 PRURITUS: ICD-10-CM

## 2024-09-18 DIAGNOSIS — M35.2 BEHCET'S DISEASE (H): ICD-10-CM

## 2024-09-18 PROCEDURE — G2211 COMPLEX E/M VISIT ADD ON: HCPCS | Performed by: INTERNAL MEDICINE

## 2024-09-18 PROCEDURE — 99214 OFFICE O/P EST MOD 30 MIN: CPT | Mod: 95 | Performed by: INTERNAL MEDICINE

## 2024-09-18 ASSESSMENT — PAIN SCALES - GENERAL: PAINLEVEL: MILD PAIN (3)

## 2024-09-18 NOTE — LETTER
2024       RE: Samara Oropeza  [address]  [facility] [address]     Dear Colleague,    Thank you for referring your patient, Samara Oropeza, to the [facility]HEUM[facility]OLOG[facility] [place] at [facility]. Please see a copy of my visit note below.    Virtual Visit Details    Joined the call at 2024, 3:31:28 pm.  Left the call at 2024, 3:44:34 pm.  Originating Location (pt. Location): Home  Distant Location (provider location): On-site  Platform used for Video Visit: [employer]    Office Visit Details      Rheumatology Clinic Return Visit Patient     Samara Oropeza MRN# [id number]   YOB: 1995 Age: 29 year old     Date of Visit: 2024   Last seen: 2024       Assessment & Plan    Assessment & Plan  Samara is a 29 year old  female (ADRIAN 21) with Behçet's disease (pathergy, oral/genital ulcers, polyarthralgia) who presents today for RECHECK  .    # Behçet's disease (pathergy, arthralgias, recurrent oral / genital ulcers)  # s/p delivery on 2023 with high-risk pregnancy, complicated by pre-eclampsia,  birth but healthy baby    10/2022:  Mrs. Oropeza arrives for follow-up of her Behçet's disease that is more active at present with recent decrease of her Otezla (60mg --> 30mg) recommended by [facility]. Previous discussion with her OB providers had been to decrease to the lowest effective dose, clearly 30mg is not effective as she describes worsened oral ulcers, genital ulcers, arthralgias, abdominal pain, and thrombophlebitis. Although there is not a great deal of evidence for Otezla safety in pregnancy, its mechanism of action would suggest it. Counterbalancing these concerns are what we understand of Behçet's in pregnancy which more often improves but in some cases (~29%) becomes worse, and can flare more often in the 1st trimester and post- period.  She also  describes some vision changes, which in the context of Behçet's is conerning. She has not seen Ophthalmology in many years; I will place a referral today.     6/21/2023: Stable today, on otezla 30 mg bid, breastfeeding, MFM is ok with breastfeeding, discussed ACR reproductive guideline, it does not recommend otezla during pregnancy and breastfeeding given lack of data; however Samara remained on it during pregnancy and now breastfeeding as stopping it leads to severe flare of her behcet and benefits of staying on it outweighs risks. Her MFM provider is aware of staying on otezla and is ok with it during breastfeeding.    9/27/23: Doing well, remains on otezla. Has had some interruptions in doses. Today has back and hand pain, and ankle swelling. Will start celebrex, ok with breastfeeding.     2/1/2024:    Behcesarai's oral/vaginal ulcers are under fair control on otezla, will continue. But she continues to have active inflammatory arthritis, she failed celebrex. Will add  mg bid; risks were discussed. HCQ is safe in pregnancy and breastfeeding in case of pregnancy in future . Will check labs.      6/5/2024:    Active inflammatory arthritis despite adding HCQ in 2/2024, HCQ causes SE, fingers are deforming. Recommend to add methotrexate; risks were discussed. Was informed that it is not allowed in pregnancy and breastfeeding. She will start after she is done with breastfeeding in 8/2024. She prefers sub q inj over oral to avoid GI SEs. Will continue otezla to control oral/genital ulcers. I reviewed labs, stable.    Today 9/18/2024:    Continues to have active inflammatory arthritis, plan is to start methotrexate as soon as she is done with breastfeeding; risks were discussed.    Stopped HCQ in 6/2024 given lack of benefit.    Oral/genital ulcers due to Behcet's are under control on otezla.    New itching, skin rashes are ongoing. I will message her derm team as prescribe topical is not covered by  insurance.        Plan 9/18/2024:    When you stop breastfeeding, start methotrexate 0.4 ml weekly under skin injection, our pharmacist Natalie ([place] referral) will call you to teach you how to do it    Start folic acid 1 mg a day to help with methotrexate side effects    Stay on otezla    Labs one month after start of methotrexate    Avoid pregnancy on methotrexate    Return in about 3-4 months in person    The longitudinal plan of care for the diagnosis(es)/condition(s) as documented were addressed during this visit. Due to the added complexity in care, I will continue to support Samara in the subsequent management and with ongoing continuity of care.      Disclosure: I earn consulting income from [employer], the company that manufactures Otezla. I am not involved directly in promoting otezla or doing research with otezla.    Dominga Sosa MD                 Medical History   History of Present Illness  Samraa Oropeza is a 29 year old female who presents for follow-up of her Behçet's.      Today 9/18/2024:      -reports being in a flare    -hands are pretty painful, bent 5th fingers    -knuckles swell up in AM and at night, reports 3 hours of AM stiffness    -almost done with breastfeeding, has not started the methotrexate yet    -reports severe itching over legs from knees down to legs, no rash, skin looks darker where she is itching, pretty swollen as well    -she never this kind of itching before, reports this started after back surgery    -had back surgery 4/30/2024, decompression of a nerve, it helped with nerve pain, still feels numb/tingly down in her legs    -gets sports over chest, breast, saw derm, was prescribed topical which is not covered by insurance. Next step would be laser tx    Seen Dr. Marilyn Moran Rheumatology in [facility] 10/14:    Original presentation 2014:     Ms Oropeza is a 19 y.o. female who presents with one year of presentation of painful oral ulcers (monthly) once that have worsened with  two episodes in the last two months that presented with painful vulvar or vaginal ulcers (2 episodes so far every month) [not sure if they leave scar] as well that timing coincides with menses ([facility] [place] [facility]: 8/25/14).   ORAL ULCERS: last two episodes were severe, painful, white base with erythematous halo, that appear and disappear in the matter of 1-2 weeks without, does not bleed and doesn't respond to any treatment used (magic mouthwash), 10-15 in number with the biggest one being dime size.      VAGINAL ULCERS: 2-3 in number between labia majora and minora that occur simultaneously with oral ulcers, painful, non bleeding ulcers that are erythematous, no pus.      FOLLICULITIS: patient reports having spots in legs specially around ankle and knee, but arms, and neck are affected as well. Small lesions that resemble ingrown hair, most are red erythematous elevated lesions, well demarcated, some with liquid that patient refers as pimple like.      SKIN RASHES: welts and red macules with well defined borders that are pruritic and non-ulcerative on chest, arms and back. Benadryl relieves.      ARTHRALGIAS: In descending order of severity: hips, knees, wrists, shoulders, neck, lumbar, fingers.   C/o morning stiffness in hips, back and neck lasting for about until noon.      Ms. Oropeza reports they present in severe flares and never fully resolve, but do get better. She has seen some swelling and warmth on the affected joints but has not noticed and redness. Has tried Tylenol, ibuprofen, and hydrocodone with not much benefit.      FEVERS: Reports 3-4 sporadic episodes per month of self limited fevers of 38 C that occur during the evenings and associate facial flushing.      HEADACHES: occur daily and are bitemporal and irradiate occipitally, pulsatile in nature, intensity varies from intense to mild, are worsened with movement. On treatment with ibuprofen and somatriptan without any positive results.      VISION CHANGES: Patient  reports that suddenly she would only see a gray blotch in her right eyes and would persist like this for a day and a half. Also reports blurriness in her left eye. Presence of pain and burning sensation of eyeball with associated redness. Has changed prescription glasses in the matter of 2 years 3 times. She has had opthalmology exam and was not suggestive of any evidence of uveitis.   She was also evaluated in ED for a dizzy spell.      ABD PAIN W/ INTERMITTENT DIARRHEA AND CONSTIPATION: Patient reports abdominal pain that is intermittent, periods of 7 days without bowel movement and feeling bloated with change of pattern to diarrhea (liquidy without blood nor mucus, non foul smelling). Has taken Bentyl, Zegrid, and rinetidine with mild clinical improvement.  She also reports small red nodules after each needle stick for blood draw.   Neurology saw her back then and was not impressed with her presentation as physical examination was normal. Also MRI brain normal: Findings: No definite hemorrhage, mass affect, midline shift, or ventriculomegaly is noted.There are a few scattered non specific white matter T2 hyperintensities. Axial diffusion weighted images are unremarkable.     The major vascular structures appear patent. There is opacification and severe mucosal thickening in the right maxillary sinus. The remaining paranasal sinuses, and mastoid air cells are clear. Orbits are unremarkable  She has + HSV-1 IGG. Seen ID. Negative for Herpes simplex virus culture. HSV IGM I/II COMBINATION SENT TO [facility]  RESULTS = <0.91  REF RANGE: <0.91 = NEGATIVE  ID did not think HSV could explain her mouth and genital sores.      CRP within normal limits. HIV negative. CBC within normal limits; UA negative. Creatinine within normal limits   CYNDIE neg.  Antigliadin neg.   ANti TTG neg.   [facility] 2014: Colonoscopy and endoscopy neg; result not available. Not sure if biopsies were done.   Raynaud's phenomenon:  Onset: about  2013  Digits: all fingers  Digital ulcers: no  Color changes: white ---> purple and red  Duration of an attack: About couple of hours per mom  Pain during attack: not clear pain but bruise like feeling  Frequency of attacks: few times a month  Family history of Raynauds: no  GERD: very long time     No hemoptysis.   No miscarriages/ no thrombosis in past.   No family or personal history of psoriasis, ulcerative colitis or chron's disease.   No buttock pain or low back pain or stiffness in AM     Interim history:  Dec 2014- Multiple mouth ulcers and did not eat for 5 days. This coincided with periods. Colchicine 0.6mg PO BID started in Nov 2014.   Prednisone taper in Dec 20mg to 0 in 4 weeks. She also used magic mouthwash. Kenalog cream. After going to the ER, 3 days later her ulcers were better. She thinks it improved after the menstruation stopped.   LMP 3 weeks ago.   COLCHICINE HAS HELPED A LOT REDUCING THE INTENSITY OF MENSTRUATION ASSOCIATED ORAL AND VULVAR ULCERS.      Interim history: 6/15     Since last visit only two episodes of mouth sores- small sized 6   No genital ulcers since last visit.   Colchicine 1.2 mg in AM and 0.6mg in PM keeps her symptoms under control.      Admitted in 5/15:  Admission Diagnoses:  - LUE weakness  - spell  - hx of migraines  - hx of Tourette syndrome  - hx of Behcet's disease     Discharge Diagnoses:   - spell likely 2/2 anxiety  - hx of migraines  - hx of Tourette syndrome  - hx of Behcet's disease     CT and MRI brain was normal.      Seeing Dr. Lilliana Miramontes soon as outpatient.      Dr. Garcia did not find any intraocular inflammation.       Interm 10/30/2015  ED for migraine. Continues to see neuro - MRI brain without lesions noted. Saw GI - upper barium normal. May be considering repeat colo. Worsening lesions in mouth and genitals. Has had continuous lesion in mouth x 2 months. 2 genital ulcers. Had fracture of right sesamoid in foot. Continues to have low grade  "fevers. New folliculitis on chest, legs and arms.      Interim hx: 1/11/2016    Pt states that since last visit she continues to have oral ulcers and has noted more folliculitis-like lesions on her lower extremities.  She states that on her right lower leg she had a red macular spot that has since resolved.  She denies uveitis.  She states that the colchicine initially helped but then stopped working.  She takes 0.6 mg TID.  She stopped taking her prednisone last week as she feels like this hasn't helped.  She hasn't had any episodes of vaginal ulcers.      She saw GI who stated she has gastroparesis.  She is seeing Cardiology later this week for possible syncopal episodes.       Interim history 5/16  Mouth ulcers X 1 last month  Genital ulcers - none since last visit.      Bilateral MCPs pain, knee pain and low back pain +ve increased since last visit  Morning stiffness X 2 hours (increasing)   5-6/10     \"overall myalgia\" has gotten worse    C/o pseudofolliculitis lesion on anterior shins bilaterally worse     Interim history: (7/18/2016)  Patient has just started Humira for 2 doses on 7/1 and 7/15 and reported minimal improvement of her hip pain but otherwise, did not notice anything different.      She reported painful mouth ulcer once a month since last visit but noticed that it has been getting deeper.   Denied any genital ulcers.     Still has ongoing bilateral MCPs pain, knee pain but this has been intermittent and she did not have any much pain today. She reported 2-3 hours morning stiffness of both hands and pain score of 6/10 at her knees and 8/10 of her hands but currently takes no pain medication except prn ativan which she takes when her muscle is really tight.      The only concern is that she gained weight even though she has not been eating much (eat once a day) and do exercises every day for 1 hour (with video of yoga, pilates). Her mother wonders if she needs to have some labs work up include sex " hormone, thyroid, cortisol.     Interval history 9/14/16  Patient was started Humira at the last visit, patient reports worsening of skin ulcers since starting Humira and stopped taking Humura for the past 2 weeks. Patient reports oral and genital ulcers has been stable even before starting Humira and has not had any interval oral/genital ulcers. However she reports recurrent skin ulcers occurring on a daily basis that affects her chest and anterior legs. Patient also has stopped taking prednisone, she reports that she doesn't feel the prednisone helps, her last dose of prednisone was 6+ months ago. Patient also report worsening low back pain that sometimes causes her to limp.     In the interval patient also visited ED on 8/31/16 for left hand pain and what the patient describes as phlebitis, no medication or procedure was done and the patient was discharged with a splint. Patient also reported 1 episode of chest pressure that was associated with dyspnea and numbness of the left arm, she was shopping in a supermarket at the time of onset, and the pressure resolved after she took a nap.     Patient denies any infectious symptoms in the interval, no fever, chills, night sweat, cough, dysuria, denies eye pain or visual changes.     November 4, 2016  Oct - had one genital ulcer  Oral ulcers X 5- around menstruation time.   Knee pain- chronic on the left side  Dizziness spells - on and off; been to the ER for that in the past.   On and off migraines  July 2013 - s/p MPFL reconstruction plus LRL 7/2013  Morning stiffness in knee (left) X 1 hour     Severe jaw pain and chest pain- patient wondering if this is Tourette's or esophageal spasms. Cardiac workup negative.   Fever      January 13, 2017  Yesterday in ER [facility] with orthostatic syncope from dehydration.   Chest pain on and off still continues. Painful with deep breaths.   Oral ulcers - few minor ones lasting for one week;   One major one in roof of mouth lasting for  about one week.   Knee pain +ve - reviewed the recent MRI with her orthopedic surgeon.   On and off migraines  otezla is approved. Still waiting to receive the meds.      March 31, 2017  Otezla current dose 15mg PO BID.   She is tolerating okay at this dose and is willing to increase the dose further up to 30mg BID.   Her joint pains are better on otezla. Knee pain is also better.   Back and neck pain +ve 8/10 when it is worse. Intramuscular botox injections were given on Monday in PMR.   2 minor genital ulcers in the interim- resolved.   Few oral ulcers in the interim- resolved.   Nasal ulcer - resolved.   Migraines on and off.   Nausea with otezla but improving.   Dizziness and blackouts on and off. She fell once and hurt her ankle. Wearing boot in left leg.   Complete ROS negative except for above     May 17, 2017  Tolerating otezla better. Not throwing up anymore. Current dose 20mg in AM and 30mg in PM (2nd week).   Patient thinks that her oral ulcers frequency and severity are improving with otezla. Also no genital ulcers in the interim.   Currently on doxycycline for bronchitis/?pneunomia. 4 more days left.      Joint pain history  2 weeks ago/ dx with pneumonia 1 week ago/  Involved joints: all over  Pain scale: 6.5/10   Wakes the patient from sleep : Yes  Morning stiffness: Yes for 120 minutes  Meds used:otezla,colchicine     Interim history  Since last visit:  1. Infections - Yes/ pneumonia  2. New symptoms/medical problem - Yes/ thinks she may have had a mini-seizure about 10 days ago ; did not seek medical attention; did not reoccur after that. Patient seeing PCP today.  3. Any side effects from Rheum medications -vomiting a lot (resolved)  3. ER visits/Hospitalizations/surgeries - Yes  4. Last PCP visit: has an apt. today     Patient still getting double vision. Floaters present.      Therapist recommended psychiatry evaluation. Patient does not want to see psychaitry. Patient will see PCP today.       August 22, 2017  Have you ever seen a rheumatologist Yes Who You When 5/17/17     NO genital ulcers in the interim.   Mouth ulcers- three in the back of the throat and roof of the mouth last month.      Joint pain history  Onset: doing alittle worse / cut her otezla back to 30mg  Daily due to GI problems  Involved joints: all over her body. Been having more black out episodes, been having more chest pains.also has raised bumps on both legs from the knees down that itch and are painful   Pain scale:  5.5/10     Wakes the patient from sleep : Yes  Morning stiffness:Yes for 120 minutes  Meds used:otezla, colchicine (three pills daily)     Interim history  Since last visit:  1. Infections - Yes stomach issue  2. New symptoms/medical problem - No  3. Any side effects from Rheum medications -nausea from otezla  3. ER visits/Hospitalizations/surgeries - Yes, ER for foot, ankle injury from passing out and fell down the steps. Hit her head another time from passing out. Alcohol poisoning in July 4. Last PCP visit: 6/23/17 September 19, 2017  Have you ever seen a rheumatologist Yes Who You When 8/22/17  Joint pain history  Onset: pt states that she hurts everywhere for 6 days  Involved joints: all joints  Pain scale:  7/10     Wakes the patient from sleep : Yes/ not sleeping at all  Morning stiffness:Yes for 180 minutes  Meds used:colchicine, otezla, magic mouth wash, lidocaine gel  Last one week: increased joint pain; and genital ulcer  Genital lesion (dimed size) in the last one week  After botox a week ago, she had fever 101 for three days; near syncopes. Back pain; floaters  Chest pain , mouth sores, hand pain, morning pain were all better with otezla 30mg twice daily.     Interim history  Since last visit:  1. Infections - No  2. New symptoms/medical problem - No  3. Any side effects from Rheum medications -nausea from the otezla  3. ER visits/Hospitalizations/surgeries - No  4. Last PCP visit: may 2017       October 18, 2017     Have you ever seen a rheumatologist Yes Who You When 9/19/17  Joint pain history  NO mouth or genital sores in the last 4 weeks.   Medrol dosepak helped with visual symptoms but not joint pains.      Onset: pt states that she is doing better than last time with her behcet's. Today has severe stomach pains, states that she is constipated. Pt had a seizure 2 days ago. Does have a metallic taste in her mouth  Involved joints: hands , neck, shoulders  Pain scale:  9/10 from stomach pain;      Wakes the patient from sleep : Yes  Morning stiffness:Yes for 120 minutes  Meds used:cochicine , otezla 30mg twice daily     Interim history  Since last visit:  1. Infections - No  2. New symptoms/medical problem - Yes/ the cartilage in her chest is inflamed  3. Any side effects from Rheum medications -nausea from the otezla  3. ER visits/Hospitalizations/surgeries - No  4. Last PCP visit: yesterday     January 16, 2018  Have you ever seen a rheumatologist Yes Who You When 10/18/17  Joint pain history  Onset: pt states that she was in the hospital for 5 days before maribell, they told her she had lesions in her brain in the white matter. Had blood work drawn in the ER and they told her her inflammatory markers were elevated. Was seen in the ER last week for vomiting and diarrhea, not eating. Saw Gastro. On 1/10/18. 1.5 weeks ago she had an endoscopy and colonoscopy. She has been having elbow  And hands and knee pain, loosing use of her hands. Been dropping things. Also states that she has been getting lesions up her nose  Involved joints: see above  Pain scale:  8/10     Wakes the patient from sleep : Yes  Morning stiffness:Yes for 120 minutes  Meds used:colchisine, otezla, magic mouth wash, kenolog cream, lidocaine gel     Interim history  Since last visit:  1. Infections - Yes/ had an infection in her jaw. Having sinus issues  2. New symptoms/medical problem - Yes/ states that she is having problems  walking, states that she was bouncing when she was walking  3. Any side effects from Rheum medications -otezla, nausea  3. ER visits/Hospitalizations/surgeries - Yes/ see chart  4. Last PCP visit: November 2017 April 17, 2018  Have you ever seen a rheumatologist Yes Who You When 1/16/18  Joint pain history  Onset: pt states that she is not doing well. She is having increased stomach issues, only eats 2 times in 4 days unable to keep the otezla down due to vomiting . Has sleep study done in 1 week; no genital ulcers since last appointment. 6 small mouth sores since last appointment.   Involved joints: see above   Pain scale:  7/10     Wakes the patient from sleep : Yes  Morning stiffness:Yes for 60 minutes  Meds used:otezla , colchicine, diclofenac gel, magic mouthwash, lidocaine gel      Interim history  Since last visit:  1. Infections - Yes/ had a sinus infection, thinks she is getting sick now  2. New symptoms/medical problem - Yes/ neurology sent pt to cardiology. Has a heart condition but not sure what . She is seeing a ortho. Due to knee pain   3. Any side effects from Rheum medications -nausea/vomiting from the otezla   3. ER visits/Hospitalizations/surgeries - Yes/ seen in ER   4. Last PCP visit: yesterday     July 17, 2018  Have you ever seen a rheumatologist Yes Who You When 4/17/18  Joint pain history  Onset: pt Is here for a follow-up states that she started to get lesions on her legs , face and arm. Hurts all over, lower back is very painful. Right hand and wrist area are numb  Involved joints: see above  Pain scale:  7/10   worse in the morning  Wakes the patient from sleep : Yes/ does not go to sleep till late  Morning stiffness:Yes for 4-5 hours minutes  Meds used:colchicine, otezla has  Not started otezla yet due to extreme nausea      Interim history  Since last visit:  1. Infections - Yes/ had a bacterial infection in her pelvic area was hospitalized  2. New symptoms/medical problem - Yes/  hands are swelling up. Having orthopedic issues. Was having problem with her veins sticking up, and very painful. Has happened multiply times   3. Any side effects from Rheum medications -see above  3. ER visits/Hospitalizations/surgeries - Yes/ hospital and ER for bacterial infection  4. 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 ER said may have blood clot in calf, but when did MRI, stated body may have broken it up on it's own. Elevated D-dimer  Involved joints: Knees, neck, hands  Pain scale:  6.5/10     Wakes the patient from sleep : Yes  Morning stiffness:Yes for 180 minutes  Meds used:hyoscyamine, not taking otezla. Last dose Sep. Patient did not want to take otezla with the antibiotics.      Last major mouth ulcer- aug or Sep  No genital ulcers in the last 6 months.      Interim history  Since last visit:  1. Infections - Yes, UTI's twice a month since last visit  2. New symptoms/medical problem - No  3. Any side effects from Rheum medications -otzela causes nausea  3. ER visits/Hospitalizations/surgeries - Yes, 1/2/19 repaired some ligaments and mass taken out, also joint build up removed from a previous injury  4. Last PCP visit: 12/5/19 June 12, 2019  Have you ever seen a rheumatologist yes Who you When 1/9/19  Joint pain history  Onset: Patient is here for a follow up. A lot of infections and in and out of the hospital, who wanted her to follow up with Rheumatology again.  Involved joints: knees, legs, back, neck, shoulders, ankles  Pain scale:  6.5/10     Wakes the patient from sleep : Yes  Morning stiffness:Yes for 120 minutes  Meds used:magic mouthwash, colchicine     Interim history  Since last visit:  1. Infections - Yes, staph  2. New symptoms/medical problem - kidney failure  3. Any side effects from Rheum medications -none  3. ER  visits/Hospitalizations/surgeries - Yes, 3 hospitalizations, and surgeries,  4. Last PCP visit: 6/11/19 9/30/2020: New to me, establishing care. Flaring off otezla.     816   Reports worsening oral ulcers and joint pain and skin rash.     No vaginal ulcers currently. Last ones were 5 months ago.     Gets oral ulcers monthly, not today, had them last few days ago. They come up as flare up around period time. They self-resolve.     Thinks colcrys is helping but not enough, lost some benefit. No longer has diarrhea from it. the dose is 0.6 mg bid.     Came off otezla last year when she had severe staff infection, there was drug drug interaction with vancomycin. Wants to go back on it.     Skin lesions over chest are clearing up. Has skin lesions over her legs.     She is off of otezla >1 yr. She had daily vomiting and abdominal cramps initially which later resolved after 2 months.      Today, is her last day liz her health insurnaace  .     Reports pain and swelling liz hands. Drops things, lost strenghth. Veins look inflamed. Hands are swollen in AM and before bedtime. AM stiffness is 2 hours. can t tolerate ibuprofen.     Prednnisonne does not help much.     Wants to try eleve.     Had 2 blood clots over L arm, finished xraalto 4 months ago.      Was told to have severe dry eyes, hs not had eyes checked> 1 yr as was in the hospital with staff infection. systanee nd gentle eyedrops help some, sometimes gets sharp pain and and blurry vision in her eyes from dryness. No h/o uveitis.     has dry mouth, drinks water.     Gets T  F sometimes. It is sporadic.     Lost hair.     Gets intermittent CP. Had several hospital visits and admissions in the [facility] [place] for it. lorazepam helped witch chest spasms, she does not like pain meds.     She was prescribed 0.5 mgq d prn, 10 tbs/monthss.     She gets dry cough, just started using albuterol inhaler, it helps.     No SOB.     Has gastroparesis, IBS  nd h/o severas  admission for constiption, colon blockage with impaction and gets bloating. has lost follow up with GI.     She is working with  to get medical assistance to get insurance back by early next year. She filed it again.     Gets botox inj every 3 months nd they help, has to cancel 10/20 botox inj s will not have insurance. they came back yesterday.     last seizure waas last year. still gets black out spells, salts ne was last week.    derm eye gi       FHx: Both parents have RA.  SHx: She was working in a [employer] shop, it was closed because of pandemic. sexually active, not on oral contraceptives. She thinks she had a misccraaaiaage last wk, had n period x2 months then mueller bleeding a lot, dark red and was different from period. Felt something came out that she feels she miscarried, no pos pregnancy test. No smoking. rarely drinks ETOH.  [facility]   thumbs between MCP tender   otezla helped with skin lesions, oral ulcers, joint pain.  colcrys  sjogre aleve biotene voltaren [phone]        8/11/2022: Samara presents for urgent visit to discuss m/o Behcet's flare during pregnancy, she is   She is pregnant 10 wk 3 days with ADRIAN 3/6/2023.  In 2020, had 1st trimester miscarriage.  Has LBP, ankle pain/swelling.   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 Discussed with MFM, back on low dose otezla since last week, only 1 tab a day.    Interval History 10/28/2022  Mrs. Oropeza was last seen 08/11/22. At that time she was advised to continue otezla (1 tablet BID) and remain off colcrys. Since that time, she has instead taken otezla 1 tablet daily. She notes more joint pain in hands, wrists, cervical spine, knees with 4 hours of monring stiffness. Previously <1hr with full strength.     Had a few genital ulcers recently which only lasted a few days. Oral ulcers have been more active of late and are currently healing.     Abdominal pain  on L side, pain is always worse after eating. Out of the phase of her pregnancy with morning sickness, at 22w on Monday.     She describes a few fevers at the end of September with hot flashes and cold sweats with Tmax 100. Called OB-GYN who instructed she should go in if persistent, though it resolved within a few days.     More easy bruising - thighs, she is currently on lovenox and baby aspirin. Previous history of clots especially with interventions. She recently had an issue with superficial thrombophlebitis following a blood draw.     Seeing more floaters in her vision. Sometimes tiny and clear, but can also be larger and 'gray', up to 1/4 of her vision. Has not seen Ophthalmology in many years.     More frequent migraines - did a nerve block recently but this has worn off, previous botox injections.     ADRIAN 03/06/23.    1/25/23:    Baby is growing small. Had high BP yesterday, they would monitor it. DBP was 90.    Increasing otezla to bid helped. It helped with ulcers. Has skin breakdown since Christmas, never had it like this over her chest, but still it feels related to Behcet.    Hands and ankles are painful and swoleln, ankles are new but had hand pain with behcet before,. This started fater entering [address] [place] [address]. Her ADRIAN might be earlier based on baby's growth, induction at 37 wk, progress at 39. nex twk will have another check.    Still has the neck pain. Still gets eye floaters.    6/21/2023:    She delivered 1 month early due to pre-eclampsia. Had Mg infusion, had bleeding issues, spent a week. Was on BP med till a month ago, now stable. Was induced, had NVD. No blood transfusions needed.    Her baby girl Lashanda was born 2/12/2023, small, slow growth but healthy with no fetal deformities. Had vaginal sores after giving birth. Still gets mouth ulcers, one oral ulcer now, no vaginal ulcers now. Plans to breast feed x 7 months. Does breastfeeding. Saw OB/GYN in 4/2023 [facility]. They are aware she  is on otezla.    She was on ASA 81 mg every day during pregnancy.    9/27/23: Doing well, less behcet flares since giving birth. Most recent flare on her chest, now healing. No new oral or vaginal ulcers.     She has been having back pain that shoots down her right leg since June and has been utilizing PT without improvement.     She also has had swelling and pain in both hands and right wrist, as well as both ankles L>R.     She notes missing some doses of otezla due to running out of refills and avoiding taking it without food. She has been eating less because she has been more tired since having her daughter.       2/1/2024:    Has back pain. Had epidural inj one wk ago, has to wait x 2 weeks, if no help to get surgery for bulging disc    Some oral lesions 2wk ago flare with swollen hands, but currently has no oral ulcers.     Hands hurt today with pain level about 4/10. Celebrex did not help much.    No vaginal ulcers    Reports loss of appetite past 5 months    Her baby girl was sick x past 2 weeks, just got better last night, she had RSV. Samara did not get much sleep, did not eat much over past 2 weeks because of this.      6/5/2024:    -active inflammatory arthritis despite adding HCQ last visit. HCQ causes GI upset    -fingers are deforming, new    -not feeling well, has neck pain, nausea x past few days    -still breastfeeding, no pregnancy plans    -missed otezla x 2 days, got some skin lesions back over chest area; otherwise otezla is managing her behcet's sores well    Review of Systems   A comprehensive ROS was done. Positives are per HPI.    Past Medical History  Past Medical History:   Diagnosis Date     Anemia      Anxiety      Arthritis      Behcet's disease (H)      Cervical adenitis May 2010     Chronic abdominal pain      Constipation, chronic 1994     Fibromyalgia      Gastro-oesophageal reflux disease      Gastroparesis      Irregular heart beat     tachycardia, has had workup      Migraines      Neuromuscular disorder (H)     fibramyalgia     Palpitations      PONV (postoperative nausea and vomiting)      Seizure (H)      Seizures (H)     unknown etiology     Syncope      Tourette's       Past Surgical History:   Procedure Laterality Date     ARTHROSCOPY ANKLE, OPEN REPAIR LIGAMENT, COMBINED Left 9/25/2019    Procedure: LEFT ANKLE ARTHROSCOPY WITH LIGAMENT REPAIR;  Surgeon: Andres Johnson DPM;  Location: [facility]     ARTHROSCOPY ANKLE, REPAIR LIGAMENT Left 1/2/2019    Procedure: Ankle arthroscopy and sinus tarsi evacuation, ligament repair, left lower extremity;  Surgeon: Andres Johnson DPM;  Location: [facility] OR     ARTHROSCOPY KNEE WITH PATELLAR REALIGNMENT  7/25/2013    Procedure: ARTHROSCOPY KNEE WITH PATELLAR REALIGNMENT;  Left Knee Arthroscopy, Medial Patellofemoral Ligament Reconstruction with Allograft  ;  Surgeon: Jennifer Acevedo MD;  Location: [facility]     COLONOSCOPY  2015     DENTAL SURGERY  1996    Teeth removal     ENDOSCOPY UPPER, COLONOSCOPY, COMBINED  2005     HC ESOPH/GAS REFLUX TEST W NASAL IMPED >1 HR N/A 2/15/2017    Procedure: ESOPHAGEAL IMPEDENCE FUNCTION TEST WITH 24 HOUR PH GREATER THAN 1 HOUR;  Surgeon: Timothy Matta MD;  Location: [facility]     IR LUMBAR PUNCTURE  1/22/2024     IR PICC PLACEMENT > 5 YRS OF AGE  3/13/2019     IR UPPER EXTREMITY VENOGRAM LEFT  2/25/2020     IRRIGATION AND DEBRIDEMENT FOOT, COMBINED Left 3/12/2019    Procedure: COMBINED IRRIGATION AND DEBRIDEMENT LEFT ANKLE;  Surgeon: Micha Glover MD;  Location: [facility]     IRRIGATION AND DEBRIDEMENT LOWER EXTREMITY, COMBINED Left 5/7/2019    Procedure: 1.  Excision of wound down to and including deep fascia, less than 20 cm2.  2.  Irrigation and debridement, left ankle.;  Surgeon: Andres Johnson DPM;  Location: [facility] OR     PICC INSERTION Left 05/05/2019    4Fr - 45cm (5cm external), Basilic vein, SVC RA junction      Patient Active Problem List    Diagnosis Date Noted      Preeclampsia 02/10/2023     Priority: Medium     Infection of joint of ankle (H) 05/06/2019     Priority: Medium     Cellulitis 03/09/2019     Priority: Medium     Displacement of lumbar intervertebral disc without myelopathy 11/13/2018     Priority: Medium     Overview:   Created by Conversion       Iron deficiency associated with nonfamilial restless legs syndrome 11/13/2018     Priority: Medium     Enthesopathy of hip region 11/13/2018     Priority: Medium     Overview:   Created by Conversion       Tourette's syndrome 11/13/2018     Priority: Medium     Overview:   Created by Conversion       Somatic symptom disorder 06/01/2018     Priority: Medium     Pelvic floor weakness 04/25/2018     Priority: Medium     Spells of decreased attentiveness 12/19/2017     Priority: Medium     Mobile cecum (H28) 11/09/2017     Priority: Medium     Cecum noted in Right lower quadrant on 4/17 CT scan, and in Left upper Quadrant on CT on 11/2017.       Vitamin D deficiency 10/11/2017     Priority: Medium     How low, unknown/not found. On D when tested at 27. Starting cholecalciferol October 2017. Needs recheck.       Convulsions, unspecified convulsion type (H) 10/03/2017     Priority: Medium     Transient alteration of awareness 10/03/2017     Priority: Medium     Chronic pain syndrome 07/27/2017     Priority: Medium     Major depressive disorder, recurrent episode, moderate (H) 06/27/2017     Priority: Medium     Cervical pain 05/02/2017     Priority: Medium     Acute left ankle pain 03/31/2017     Priority: Medium     Cervical dystonia 03/28/2017     Priority: Medium     PTSD (post-traumatic stress disorder) 01/17/2017     Priority: Medium     Patellofemoral instability 10/20/2016     Priority: Medium     Fibromyalgia 08/04/2016     Priority: Medium     Rheumatoid arthritis of multiple sites without rheumatoid factor (H) 08/04/2016     Priority: Medium     Raynaud's disease without gangrene 08/04/2016     Priority: Medium      Chronic abdominal pain 08/04/2016     Priority: Medium     Palpitations 01/12/2016     Priority: Medium     On colchicine therapy 10/30/2015     Priority: Medium     Spell of shaking 05/06/2015     Priority: Medium     Migraine 02/04/2015     Priority: Medium     Behcet's disease (H) 12/10/2014     Priority: Medium     Headaches due to old head injury 11/12/2013     Priority: Medium     Milk protein intolerance 10/11/2013     Priority: Medium     Intestinal malabsorption 10/11/2013     Priority: Medium     Concussion 02/13/2013     Priority: Medium     Jan 2013, with prolonged recovery- followed by sports med         Knee pain 01/03/2013     Priority: Medium     Generalized anxiety disorder 06/25/2009     Priority: Medium     Tics - Tourette syndrome 05/18/2009     Priority: Medium     Followed by psychotherapy. Symptoms well managed. Originally diagnosed at [facility] neurology. (Dr. Simpson)           IBS (irritable bowel syndrome) 05/18/2009     Priority: Medium     Allergic rhinitis 05/18/2009     Priority: Medium     GERD (gastroesophageal reflux disease) 01/10/2008     Priority: Medium     Gastroparesis 1994     Priority: Medium      Family History   Problem Relation Age of Onset     Depression Mother      Neurologic Disorder Mother         Migraines, take imitrex injection.  Also in maternal grandmother.       Alcohol/Drug Father      Hypertension Father      Depression Father      Osteoarthritis Father      Cardiovascular Maternal Grandmother      Depression Maternal Grandmother      Hypertension Maternal Grandmother      Alzheimer Disease Maternal Grandmother      Cardiovascular Maternal Grandfather      Hypertension Maternal Grandfather      Depression Maternal Grandfather      Alcohol/Drug Maternal Grandfather      Diabetes Maternal Grandfather      Cardiovascular Paternal Grandmother      Hypertension Paternal Grandmother      Cardiovascular Paternal Grandfather      Hypertension Paternal Grandfather       Glaucoma No family hx of      Macular Degeneration No family hx of       Allergies   Allergen Reactions     Amoxil [Penicillins] Rash     Dad unsure of reaction.     Bee Venom Anaphylaxis     Bioflavonoids Anaphylaxis     Citrus Anaphylaxis     All [place]     Contrast Dye Rash     Contrast Media [place] [facility] [place] Contrast Media [place] [facility] [place].  NOTE: this is a contrast media oral with iodine. Premedicate with methylpred standard for IV contrast, request barium contrast for oral contrast.     Iodinated Contrast Media Hives and Rash     Contrast Media Ready-Box [facility] [place].; Contrast Media Ready-[place] [facility] [place].  NOTE: this is a contrast media oral with iodine. Premedicate with methylpred standard for IV contrast, request barium contrast for oral contrast.     Pineapple Anaphylaxis, Difficulty breathing and Rash     Reglan [Metoclopramide] Other (See Comments)     IV dose only, in ER, rapid heart rate.     Ace Inhibitors      Difficulty in breathing and GI upset     Amitiza [Lubiprostone] Nausea and Vomiting     Amoxicillin-Pot Clavulanate      Midazolam Unknown     parent states that when pt takes this medication, she wakes up being very violent .     Midazolam Hcl      Coming out of pelvic exam at age of 5, was kicking and screaming when coming out of the versed.     Other [No Clinical Screening - See Comments]      Bleech/ chest tightness, itchy throat, swollen tongue, hives     Tizanidine Other (See Comments)     Confusion, back pain, photophobia, abdominal pain, shaking, anxious       Adhesive Tape Rash     Azithromycin Hives and Rash     Cephalexin Itching and Rash     Sulfa Antibiotics Rash     Skin scarring      Current Outpatient Medications   Medication Sig Dispense Refill     albuterol (PROAIR HFA/PROVENTIL HFA/VENTOLIN HFA) 108 (90 Base) MCG/ACT inhaler Inhale 2 puffs into the lungs every 6 hours as needed for shortness of breath / dyspnea or wheezing 1 Inhaler 1      amitriptyline (ELAVIL) 25 MG tablet Take 1 tablet (25 mg) by mouth at bedtime 90 tablet 1     apremilast (OTEZLA) 30 MG tablet Take 1 tablet (30 mg) by mouth 2 times daily Hold for signs of infection, and seek medical attention. 180 tablet 3     artificial tears OINT ophthalmic ointment 0.5 inch strip each eye at night 1 Tube 11     azelaic acid (FINACIA) 15 % external gel Once daily to scars, upper chest and small portion of back and spot on belly button. Hold if irritating 50 g 5     benzocaine (AMERICAINE) 20 % external aerosol Apply to perineum four times daily as needed for pain 57 g 3     benzocaine (TOPICALE XTRA) 20 % GEL Apply 1 g to affected area 4 times daily as needed for mouth sores. 30 g 5     betamethasone valerate (VALISONE) 0.1 % cream Apply topically 2 times daily as needed        bisacodyl (DULCOLAX) 5 MG EC tablet Take 2 tablets (10 mg) by mouth daily as needed for constipation 30 tablet 0     citalopram (CELEXA) 20 MG tablet Take 1 tablet (20 mg) by mouth daily 90 tablet 1     EPINEPHrine (EPIPEN 2-EAMON) 0.3 MG/0.3ML injection Inject 0.3 mLs (0.3 mg) into the muscle as needed for anaphylaxis 0.6 mL 3     etonogestrel (NEXPLANON) 68 MG IMPL Inject 68 mg Subcutaneous       fluocinonide (LIDEX) 0.05 % external gel Apply topically 2 times daily. 60 g 11     folic acid (FOLVITE) 1 MG tablet Take 1 tablet (1 mg) by mouth daily 90 tablet 3     gabapentin (NEURONTIN) 300 MG capsule Take 1 capsule (300 mg) by mouth every morning 60 capsule 1     hydrocortisone, Perianal, (ANUSOL-HC) 2.5 % cream Place rectally 3 times daily as needed for hemorrhoids 30 g 0     hydrOXYzine HCl (ATARAX) 25 MG tablet TAKE ONE OR TWO TABLETS BY MOUTH EVERY SIX HOURS AS NEEDED       hypromellose (GENTEAL) 0.3 % SOLN 1 drop every hour as needed for dry eyes        lanolin ointment Apply topically every hour as needed for other (sore nipples) 7 g 3     lidocaine (LMX4) 4 % external cream Apply topically once as needed for mild  "pain 120 g 1     LINZESS 290 MCG capsule TAKE 1 CAPSULE BY MOUTH EVERY DAY IN THE MORNING BEFORE BREAKFAST 90 capsule 0     mometasone (ELOCON) 0.1 % external ointment Apply topically 2 times daily. 45 g 11     ondansetron (ZOFRAN ODT) 8 MG ODT tab Take 1 tablet (8 mg) by mouth every 8 hours as needed 90 tablet 3     polyethylene glycol (MIRALAX/GLYCOLAX) powder Take 1 capful by mouth 3 times daily       Prenatal MV-Min-Fe Fum-FA-DHA (PRENATAL 1 PO)        rizatriptan (MAXALT-MLT) 5 MG ODT DISSOLVE 1 OR 2 TABLETS IN THE MOUTH AS NEEDED FOR HEADACHE AT ONSET OF MIGRAINE, MAY REPEAT IN 2 HOURS AS NEEDED. MAX 30MG IN 24 HOURS AND MAX 5 DAYS PER MONTH       sodium fluoride 1.1 % CREA Apply 1 Application topically daily       triamcinolone (KENALOG) 0.1 % external ointment Apply twice daily as needed to lesions on the genitals and body. OK to use very sparingly on the face. 454 g 2     benzocaine (TOPICALE XTRA) 20 % GEL Apply as needed locally to mouth or nasal ulcers for pain; 4 times daily as needed 30 g 1     hydroquinone (ARACELY) 4 % external cream Apply once daily for 3 months. Stop for one month. If further lightening is desired, proceed with one additional month of treatment. 28 g 1     lactulose 20 GM/30ML SOLN Take 30 mLs by mouth 3 times daily as needed (for constipation) 300 mL 3     methotrexate sodium, PF, 50 MG/2ML SOLN injection Inject 0.4 mLs (10 mg) Subcutaneous every 7 days 10 mL 1     Sharps Container MISC Use for methotreaxte shots 1 each 11     sucralfate (CARAFATE) 1 GM/10ML suspension Take 10 mLs (1 g) by mouth 4 times daily 1200 mL 2     syringe/needle, sisp, 25G X 5/8\" 1 ML MISC Inject 1 Syringe Subcutaneous every 7 days Use with Methotrexate 100 each 3     vitamin D2 (ERGOCALCIFEROL) 00734 units (1250 mcg) capsule Take 1 capsule (50,000 Units) by mouth every 7 days (Patient not taking: Reported on 8/27/2024) 12 capsule 0           Physical Exam   Ht 1.6 m (5' 3\")   Wt 63 kg (139 lb)   BMI " 24.62 kg/m    GA: NAD, pleasant  HEENT: nl sclera/conj  MSK: can't extend 5th fingers and now new deformity of 4th finger, ulnar deviation of fingers, no major swelling over hands  Skin: no rash  Neuro: non focal  Psych: nl affect      There is currently no information documented on the homunculus. Go to the Rheumatology activity and complete the homunculus joint exam.  Joint Exam 09/18/2024     No joint exam has been documented for this visit          Data   Data     10/28/2022   BEAUCHAMP-28 (ESR) --   BEAUCHAMP-28 (CRP) --   Tender (BEAUCHAMP-28) 2 / 28    Swollen (BEAUCHAMP-28) 0 / 28    Provider Global --   Patient Global --   ESR 17 mm/hr   CRP --     No results found for any visits on 09/18/24.  CBC RESULTS: Recent Labs   Lab Test 09/07/20  1147   WBC 4.1   RBC 5.09   HGB 13.0   HCT 43.0   MCV 85   MCH 25.5*   MCHC 30.2*   RDW 15.0        TSH   Date Value Ref Range Status   09/07/2020 0.77 0.40 - 4.00 mU/L Final   03/21/2019 0.53 0.40 - 4.00 mU/L Final   10/30/2018 1.06 0.40 - 4.00 mU/L Final   11/26/2016 0.87 0.40 - 4.00 mU/L Final     T4 Free   Date Value Ref Range Status   01/31/2007 1.26 0.70 - 1.85 ng/dL Final     Rheumatoid Factor   Date Value Ref Range Status   02/06/2024 <10 <14 IU/mL Final   09/30/2020 <7 <12 IU/mL Final   05/06/2016 <20 <20 IU/mL Final       Reviewed Rheumatology lab flowsheet         Again, thank you for allowing me to participate in the care of your patient.      Sincerely,    Dominga Sosa MD

## 2024-09-18 NOTE — NURSING NOTE
Current patient location: [address] [place] [address]   [facility] [place] [facility] [address]    Is the patient currently in the state of [place]? YES    Visit mode:VIDEO    If the visit is dropped, the patient can be reconnected by: VIDEO VISIT: Text to cell phone:   Telephone Information:   Mobile [phone]       Will anyone else be joining the visit? NO  (If patient encounters technical issues they should call [phone]    How would you like to obtain your AVS? MyChart    Are changes needed to the allergy or medication list? No    Are refills needed on medications prescribed by this physician? NO    Rooming Documentation:  Questionnaire(s) completed      Reason for visit: MARILUZ LAIRDF

## 2024-09-18 NOTE — PROGRESS NOTES
Virtual Visit Details    Joined the call at 2024, 3:31:28 pm.  Left the call at 2024, 3:44:34 pm.  Originating Location (pt. Location): Home  Distant Location (provider location): On-site  Platform used for Video Visit: Magdy    Office Visit Details      Rheumatology Clinic Return Visit Patient     Samara Oropeza MRN# [id number]   YOB: 1995 Age: 29 year old     Date of Visit: 2024   Last seen: 2024       Assessment & Plan    Assessment & Plan   Samara is a 29 year old  female (ADRIAN 21) with Behçet's disease (pathergy, oral/genital ulcers, polyarthralgia) who presents today for RECHECK  .    # Behçet's disease (pathergy, arthralgias, recurrent oral / genital ulcers)  # s/p delivery on 2023 with high-risk pregnancy, complicated by pre-eclampsia,  birth but healthy baby    10/2022:  Mrs. Oropeza arrives for follow-up of her Behçet's disease that is more active at present with recent decrease of her Otezla (60mg --> 30mg) recommended by MFM. Previous discussion with her OB providers had been to decrease to the lowest effective dose, clearly 30mg is not effective as she describes worsened oral ulcers, genital ulcers, arthralgias, abdominal pain, and thrombophlebitis. Although there is not a great deal of evidence for Otezla safety in pregnancy, its mechanism of action would suggest it. Counterbalancing these concerns are what we understand of Behçet's in pregnancy which more often improves but in some cases (~29%) becomes worse, and can flare more often in the 1st trimester and post- period.  She also describes some vision changes, which in the context of Behçet's is conerning. She has not seen Ophthalmology in many years; I will place a referral today.     2023: Stable today, on otezla 30 mg bid, breastfeeding, MFM is ok with breastfeeding, discussed ACR reproductive guideline, it does not recommend otezla during pregnancy and breastfeeding  given lack of data; however Samara remained on it during pregnancy and now breastfeeding as stopping it leads to severe flare of her behcet and benefits of staying on it outweighs risks. Her MFM provider is aware of staying on otezla and is ok with it during breastfeeding.    9/27/23: Doing well, remains on otezla. Has had some interruptions in doses. Today has back and hand pain, and ankle swelling. Will start celebrex, ok with breastfeeding.     2/1/2024:    Behcet's oral/vaginal ulcers are under fair control on otezla, will continue. But she continues to have active inflammatory arthritis, she failed celebrex. Will add  mg bid; risks were discussed. HCQ is safe in pregnancy and breastfeeding in case of pregnancy in future . Will check labs.      6/5/2024:    Active inflammatory arthritis despite adding HCQ in 2/2024, HCQ causes SE, fingers are deforming. Recommend to add methotrexate; risks were discussed. Was informed that it is not allowed in pregnancy and breastfeeding. She will start after she is done with breastfeeding in 8/2024. She prefers sub q inj over oral to avoid GI SEs. Will continue otezla to control oral/genital ulcers. I reviewed labs, stable.    Today 9/18/2024:    Continues to have active inflammatory arthritis, plan is to start methotrexate as soon as she is done with breastfeeding; risks were discussed.    Stopped HCQ in 6/2024 given lack of benefit.    Oral/genital ulcers due to Behcet's are under control on otezla.    New itching, skin rashes are ongoing. I will message her derm team as prescribe topical is not covered by insurance.        Plan 9/18/2024:    When you stop breastfeeding, start methotrexate 0.4 ml weekly under skin injection, our pharmacist Natalie (MTM referral) will call you to teach you how to do it    Start folic acid 1 mg a day to help with methotrexate side effects    Stay on otezla    Labs one month after start of methotrexate    Avoid pregnancy on  methotrexate    Return in about 3-4 months in person    The longitudinal plan of care for the diagnosis(es)/condition(s) as documented were addressed during this visit. Due to the added complexity in care, I will continue to support Samara in the subsequent management and with ongoing continuity of care.      Disclosure: I earn consulting income from [employer], the company that manufactures Otezla. I am not involved directly in promoting otezla or doing research with otezla.    Dominga Sosa MD                 Medical History   History of Present Illness   Samara Oropeza is a 29 year old female who presents for follow-up of her Behçet's.      Today 9/18/2024:      -reports being in a flare    -hands are pretty painful, bent 5th fingers    -knuckles swell up in AM and at night, reports 3 hours of AM stiffness    -almost done with breastfeeding, has not started the methotrexate yet    -reports severe itching over legs from knees down to legs, no rash, skin looks darker where she is itching, pretty swollen as well    -she never this kind of itching before, reports this started after back surgery    -had back surgery 4/30/2024, decompression of a nerve, it helped with nerve pain, still feels numb/tingly down in her legs    -gets sports over chest, breast, saw derm, was prescribed topical which is not covered by insurance. Next step would be laser tx    Seen Dr. Marilyn Moran Rheumatology in [facility] 10/14:    Original presentation 2014:     Ms Oropeza is a 19 y.o. female who presents with one year of presentation of painful oral ulcers (monthly) once that have worsened with two episodes in the last two months that presented with painful vulvar or vaginal ulcers (2 episodes so far every month) [not sure if they leave scar] as well that timing coincides with menses ([facility] [place]: 8/25/14).   ORAL ULCERS: last two episodes were severe, painful, white base with erythematous halo, that appear and disappear in the matter of 1-2 weeks  without, does not bleed and doesn't respond to any treatment used (magic mouthwash), 10-15 in number with the biggest one being dime size.      VAGINAL ULCERS: 2-3 in number between labia majora and minora that occur simultaneously with oral ulcers, painful, non bleeding ulcers that are erythematous, no pus.      FOLLICULITIS: patient reports having spots in legs specially around ankle and knee, but arms, and neck are affected as well. Small lesions that resemble ingrown hair, most are red erythematous elevated lesions, well demarcated, some with liquid that patient refers as pimple like.      SKIN RASHES: welts and red macules with well defined borders that are pruritic and non-ulcerative on chest, arms and back. Benadryl relieves.      ARTHRALGIAS: In descending order of severity: hips, knees, wrists, shoulders, neck, lumbar, fingers.   C/o morning stiffness in hips, back and neck lasting for about until noon.      Ms. Oropeza reports they present in severe flares and never fully resolve, but do get better. She has seen some swelling and warmth on the affected joints but has not noticed and redness. Has tried Tylenol, ibuprofen, and hydrocodone with not much benefit.      FEVERS: Reports 3-4 sporadic episodes per month of self limited fevers of 38 C that occur during the evenings and associate facial flushing.      HEADACHES: occur daily and are bitemporal and irradiate occipitally, pulsatile in nature, intensity varies from intense to mild, are worsened with movement. On treatment with ibuprofen and somatriptan without any positive results.      VISION CHANGES: Patient reports that suddenly she would only see a gray blotch in her right eyes and would persist like this for a day and a half. Also reports blurriness in her left eye. Presence of pain and burning sensation of eyeball with associated redness. Has changed prescription glasses in the matter of 2 years 3 times. She has had opthalmology exam and was not  suggestive of any evidence of uveitis.   She was also evaluated in ED for a dizzy spell.      ABD PAIN W/ INTERMITTENT DIARRHEA AND CONSTIPATION: Patient reports abdominal pain that is intermittent, periods of 7 days without bowel movement and feeling bloated with change of pattern to diarrhea (liquidy without blood nor mucus, non foul smelling). Has taken Bentyl, Zegrid, and rinetidine with mild clinical improvement.  She also reports small red nodules after each needle stick for blood draw.   Neurology saw her back then and was not impressed with her presentation as physical examination was normal. Also MRI brain normal: Findings: No definite hemorrhage, mass affect, midline shift, or ventriculomegaly is noted.There are a few scattered non specific white matter T2 hyperintensities. Axial diffusion weighted images are unremarkable.     The major vascular structures appear patent. There is opacification and severe mucosal thickening in the right maxillary sinus. The remaining paranasal sinuses, and mastoid air cells are clear. Orbits are unremarkable  She has + HSV-1 IGG. Seen ID. Negative for Herpes simplex virus culture. HSV IGM I/II COMBINATION SENT TO [facility]  RESULTS = <0.91  REF RANGE: <0.91 = NEGATIVE  ID did not think HSV could explain her mouth and genital sores.      CRP within normal limits. HIV negative. CBC within normal limits; UA negative. Creatinine within normal limits   CYNDIE neg.  Antigliadin neg.   ANti TTG neg.   [facility] 2014: Colonoscopy and endoscopy neg; result not available. Not sure if biopsies were done.   Raynaud's phenomenon:  Onset: about 2013  Digits: all fingers  Digital ulcers: no  Color changes: white ---> purple and red  Duration of an attack: About couple of hours per mom  Pain during attack: not clear pain but bruise like feeling  Frequency of attacks: few times a month  Family history of Raynauds: no  GERD: very long time     No hemoptysis.   No miscarriages/ no thrombosis in past.    No family or personal history of psoriasis, ulcerative colitis or chron's disease.   No buttock pain or low back pain or stiffness in AM     Interim history:  Dec 2014- Multiple mouth ulcers and did not eat for 5 days. This coincided with periods. Colchicine 0.6mg PO BID started in Nov 2014.   Prednisone taper in Dec 20mg to 0 in 4 weeks. She also used magic mouthwash. Kenalog cream. After going to the ER, 3 days later her ulcers were better. She thinks it improved after the menstruation stopped.   LMP 3 weeks ago.   COLCHICINE HAS HELPED A LOT REDUCING THE INTENSITY OF MENSTRUATION ASSOCIATED ORAL AND VULVAR ULCERS.      Interim history: 6/15     Since last visit only two episodes of mouth sores- small sized 6   No genital ulcers since last visit.   Colchicine 1.2 mg in AM and 0.6mg in PM keeps her symptoms under control.      Admitted in 5/15:  Admission Diagnoses:  - LUE weakness  - spell  - hx of migraines  - hx of Tourette syndrome  - hx of Behcet's disease     Discharge Diagnoses:   - spell likely 2/2 anxiety  - hx of migraines  - hx of Tourette syndrome  - hx of Behcet's disease     CT and MRI brain was normal.      Seeing Dr. Lilliana Miramontes soon as outpatient.      Dr. Garcia did not find any intraocular inflammation.       Interm 10/30/2015  ED for migraine. Continues to see neuro - MRI brain without lesions noted. Saw GI - upper barium normal. May be considering repeat colo. Worsening lesions in mouth and genitals. Has had continuous lesion in mouth x 2 months. 2 genital ulcers. Had fracture of right sesamoid in foot. Continues to have low grade fevers. New folliculitis on chest, legs and arms.      Interim hx: 1/11/2016    Pt states that since last visit she continues to have oral ulcers and has noted more folliculitis-like lesions on her lower extremities.  She states that on her right lower leg she had a red macular spot that has since resolved.  She denies uveitis.  She states that the colchicine  "initially helped but then stopped working.  She takes 0.6 mg TID.  She stopped taking her prednisone last week as she feels like this hasn't helped.  She hasn't had any episodes of vaginal ulcers.      She saw GI who stated she has gastroparesis.  She is seeing Cardiology later this week for possible syncopal episodes.       Interim history 5/16  Mouth ulcers X 1 last month  Genital ulcers - none since last visit.      Bilateral MCPs pain, knee pain and low back pain +ve increased since last visit  Morning stiffness X 2 hours (increasing)   5-6/10     \"overall myalgia\" has gotten worse    C/o pseudofolliculitis lesion on anterior shins bilaterally worse     Interim history: (7/18/2016)  Patient has just started Humira for 2 doses on 7/1 and 7/15 and reported minimal improvement of her hip pain but otherwise, did not notice anything different.      She reported painful mouth ulcer once a month since last visit but noticed that it has been getting deeper.   Denied any genital ulcers.     Still has ongoing bilateral MCPs pain, knee pain but this has been intermittent and she did not have any much pain today. She reported 2-3 hours morning stiffness of both hands and pain score of 6/10 at her knees and 8/10 of her hands but currently takes no pain medication except prn ativan which she takes when her muscle is really tight.      The only concern is that she gained weight even though she has not been eating much (eat once a day) and do exercises every day for 1 hour (with video of yoga, pilates). Her mother wonders if she needs to have some labs work up include sex hormone, thyroid, cortisol.     Interval history 9/14/16  Patient was started Humira at the last visit, patient reports worsening of skin ulcers since starting Humira and stopped taking Humura for the past 2 weeks. Patient reports oral and genital ulcers has been stable even before starting Humira and has not had any interval oral/genital ulcers. However she " reports recurrent skin ulcers occurring on a daily basis that affects her chest and anterior legs. Patient also has stopped taking prednisone, she reports that she doesn't feel the prednisone helps, her last dose of prednisone was 6+ months ago. Patient also report worsening low back pain that sometimes causes her to limp.     In the interval patient also visited ED on 8/31/16 for left hand pain and what the patient describes as phlebitis, no medication or procedure was done and the patient was discharged with a splint. Patient also reported 1 episode of chest pressure that was associated with dyspnea and numbness of the left arm, she was shopping in a [employer] at the time of onset, and the pressure resolved after she took a nap.     Patient denies any infectious symptoms in the interval, no fever, chills, night sweat, cough, dysuria, denies eye pain or visual changes.     November 4, 2016  Oct - had one genital ulcer  Oral ulcers X 5- around menstruation time.   Knee pain- chronic on the left side  Dizziness spells - on and off; been to the ER for that in the past.   On and off migraines  July 2013 - s/p MPFL reconstruction plus LRL 7/2013  Morning stiffness in knee (left) X 1 hour     Severe jaw pain and chest pain- patient wondering if this is Tourette's or esophageal spasms. Cardiac workup negative.   Fever      January 13, 2017  Yesterday in ER [facility] [place] with orthostatic syncope from dehydration.   Chest pain on and off still continues. Painful with deep breaths.   Oral ulcers - few minor ones lasting for one week;   One major one in roof of mouth lasting for about one week.   Knee pain +ve - reviewed the recent MRI with her orthopedic surgeon.   On and off migraines  otezla is approved. Still waiting to receive the meds.      March 31, 2017  Otezla current dose 15mg PO BID.   She is tolerating okay at this dose and is willing to increase the dose further up to 30mg BID.   Her joint pains are better on otezla.  Knee pain is also better.   Back and neck pain +ve 8/10 when it is worse. Intramuscular botox injections were given on Monday in PMR.   2 minor genital ulcers in the interim- resolved.   Few oral ulcers in the interim- resolved.   Nasal ulcer - resolved.   Migraines on and off.   Nausea with otezla but improving.   Dizziness and blackouts on and off. She fell once and hurt her ankle. Wearing boot in left leg.   Complete ROS negative except for above     May 17, 2017  Tolerating otezla better. Not throwing up anymore. Current dose 20mg in AM and 30mg in PM (2nd week).   Patient thinks that her oral ulcers frequency and severity are improving with otezla. Also no genital ulcers in the interim.   Currently on doxycycline for bronchitis/?pneunomia. 4 more days left.      Joint pain history  2 weeks ago/ dx with pneumonia 1 week ago/  Involved joints: all over  Pain scale: 6.5/10   Wakes the patient from sleep : Yes  Morning stiffness: Yes for 120 minutes  Meds used:otezla,colchicine     Interim history  Since last visit:  1. Infections - Yes/ pneumonia  2. New symptoms/medical problem - Yes/ thinks she may have had a mini-seizure about 10 days ago ; did not seek medical attention; did not reoccur after that. Patient seeing PCP today.  3. Any side effects from Rheum medications -vomiting a lot (resolved)  3. ER visits/Hospitalizations/surgeries - Yes  4. Last PCP visit: has an apt. today     Patient still getting double vision. Floaters present.      Therapist recommended psychiatry evaluation. Patient does not want to see psychaitry. Patient will see PCP today.      August 22, 2017  Have you ever seen a rheumatologist Yes Who You When 5/17/17     NO genital ulcers in the interim.   Mouth ulcers- three in the back of the throat and roof of the mouth last month.      Joint pain history  Onset: doing alittle worse / cut her otezla back to 30mg  Daily due to GI problems  Involved joints: all over her body. Been having more  black out episodes, been having more chest pains.also has raised bumps on both legs from the knees down that itch and are painful   Pain scale:  5.5/10     Wakes the patient from sleep : Yes  Morning stiffness:Yes for 120 minutes  Meds used:otezla, colchicine (three pills daily)     Interim history  Since last visit:  1. Infections - Yes stomach issue  2. New symptoms/medical problem - No  3. Any side effects from Rheum medications -nausea from otezla  3. ER visits/Hospitalizations/surgeries - Yes, ER for foot, ankle injury from passing out and fell down the steps. Hit her head another time from passing out. Alcohol poisoning in July 4. Last PCP visit: 6/23/17 September 19, 2017  Have you ever seen a rheumatologist Yes Who You When 8/22/17  Joint pain history  Onset: pt states that she hurts everywhere for 6 days  Involved joints: all joints  Pain scale:  7/10     Wakes the patient from sleep : Yes/ not sleeping at all  Morning stiffness:Yes for 180 minutes  Meds used:colchicine, otezla, magic mouth wash, lidocaine gel  Last one week: increased joint pain; and genital ulcer  Genital lesion (dimed size) in the last one week  After botox a week ago, she had fever 101 for three days; near syncopes. Back pain; floaters  Chest pain , mouth sores, hand pain, morning pain were all better with otezla 30mg twice daily.     Interim history  Since last visit:  1. Infections - No  2. New symptoms/medical problem - No  3. Any side effects from Rheum medications -nausea from the otezla  3. ER visits/Hospitalizations/surgeries - No  4. Last PCP visit: may 2017      October 18, 2017     Have you ever seen a rheumatologist Yes Who You When 9/19/17  Joint pain history  NO mouth or genital sores in the last 4 weeks.   Medrol dosepak helped with visual symptoms but not joint pains.      Onset: pt states that she is doing better than last time with her behcet's. Today has severe stomach pains, states that she is constipated. Pt had  a seizure 2 days ago. Does have a metallic taste in her mouth  Involved joints: hands , neck, shoulders  Pain scale:  9/10 from stomach pain;      Wakes the patient from sleep : Yes  Morning stiffness:Yes for 120 minutes  Meds used:cochicine , otezla 30mg twice daily     Interim history  Since last visit:  1. Infections - No  2. New symptoms/medical problem - Yes/ the cartilage in her chest is inflamed  3. Any side effects from Rheum medications -nausea from the otezla  3. ER visits/Hospitalizations/surgeries - No  4. Last PCP visit: yesterday     January 16, 2018  Have you ever seen a rheumatologist Yes Who You When 10/18/17  Joint pain history  Onset: pt states that she was in the hospital for 5 days before christmas, they told her she had lesions in her brain in the white matter. Had blood work drawn in the ER and they told her her inflammatory markers were elevated. Was seen in the ER last week for vomiting and diarrhea, not eating. Saw Gastro. On 1/10/18. 1.5 weeks ago she had an endoscopy and colonoscopy. She has been having elbow  And hands and knee pain, loosing use of her hands. Been dropping things. Also states that she has been getting lesions up her nose  Involved joints: see above  Pain scale:  8/10     Wakes the patient from sleep : Yes  Morning stiffness:Yes for 120 minutes  Meds used:colchisine, otezla, magic mouth wash, kenolog cream, lidocaine gel     Interim history  Since last visit:  1. Infections - Yes/ had an infection in her jaw. Having sinus issues  2. New symptoms/medical problem - Yes/ states that she is having problems walking, states that she was bouncing when she was walking  3. Any side effects from Rheum medications -otezla, nausea  3. ER visits/Hospitalizations/surgeries - Yes/ see chart  4. Last PCP visit: November 2017 April 17, 2018  Have you ever seen a rheumatologist Yes Who You When 1/16/18  Joint pain history  Onset: pt states that she is not doing well. She is having  increased stomach issues, only eats 2 times in 4 days unable to keep the otezla down due to vomiting . Has sleep study done in 1 week; no genital ulcers since last appointment. 6 small mouth sores since last appointment.   Involved joints: see above   Pain scale:  7/10     Wakes the patient from sleep : Yes  Morning stiffness:Yes for 60 minutes  Meds used:otezla , colchicine, diclofenac gel, magic mouthwash, lidocaine gel      Interim history  Since last visit:  1. Infections - Yes/ had a sinus infection, thinks she is getting sick now  2. New symptoms/medical problem - Yes/ neurology sent pt to cardiology. Has a heart condition but not sure what . She is seeing a ortho. Due to knee pain   3. Any side effects from Rheum medications -nausea/vomiting from the otezla   3. ER visits/Hospitalizations/surgeries - Yes/ seen in ER   4. Last PCP visit: yesterday     July 17, 2018  Have you ever seen a rheumatologist Yes Who You When 4/17/18  Joint pain history  Onset: pt Is here for a follow-up states that she started to get lesions on her legs , face and arm. Hurts all over, lower back is very painful. Right hand and wrist area are numb  Involved joints: see above  Pain scale:  7/10   worse in the morning  Wakes the patient from sleep : Yes/ does not go to sleep till late  Morning stiffness:Yes for 4-5 hours minutes  Meds used:colchicine, otezla has  Not started otezla yet due to extreme nausea      Interim history  Since last visit:  1. Infections - Yes/ had a bacterial infection in her pelvic area was hospitalized  2. New symptoms/medical problem - Yes/ hands are swelling up. Having orthopedic issues. Was having problem with her veins sticking up, and very painful. Has happened multiply times   3. Any side effects from Rheum medications -see above  3. ER visits/Hospitalizations/surgeries - Yes/ hospital and ER for bacterial infection  4. Last PCP visit: 4/24/18 January 9, 2019  Have you ever seen a rheumatologist yes  Who you When 7/17/18  Joint pain history  Onset: Patient is here for a follow up on Behcet's disease, and fibromyalgia. ER said may have blood clot in calf, but when did MRI, stated body may have broken it up on it's own. Elevated D-dimer  Involved joints: Knees, neck, hands  Pain scale:  6.5/10     Wakes the patient from sleep : Yes  Morning stiffness:Yes for 180 minutes  Meds used:hyoscyamine, not taking otezla. Last dose Sep. Patient did not want to take otezla with the antibiotics.      Last major mouth ulcer- aug or Sep  No genital ulcers in the last 6 months.      Interim history  Since last visit:  1. Infections - Yes, UTI's twice a month since last visit  2. New symptoms/medical problem - No  3. Any side effects from Rheum medications -otzela causes nausea  3. ER visits/Hospitalizations/surgeries - Yes, 1/2/19 repaired some ligaments and mass taken out, also joint build up removed from a previous injury  4. Last PCP visit: 12/5/19 June 12, 2019  Have you ever seen a rheumatologist yes Who you When 1/9/19  Joint pain history  Onset: Patient is here for a follow up. A lot of infections and in and out of the hospital, who wanted her to follow up with Rheumatology again.  Involved joints: knees, legs, back, neck, shoulders, ankles  Pain scale:  6.5/10     Wakes the patient from sleep : Yes  Morning stiffness:Yes for 120 minutes  Meds used:magic mouthwash, colchicine     Interim history  Since last visit:  1. Infections - Yes, staph  2. New symptoms/medical problem - kidney failure  3. Any side effects from Rheum medications -none  3. ER visits/Hospitalizations/surgeries - Yes, 3 hospitalizations, and surgeries,  4. Last PCP visit: 6/11/19 9/30/2020: New to me, establishing care. Flaring off otezla.     816   Reports worsening oral ulcers and joint pain and skin rash.     No vaginal ulcers currently. Last ones were 5 months ago.     Gets oral ulcers monthly, not today, had them last few days ago. They  come up as flare up around period time. They self-resolve.     Thinks colcrys is helping but not enough, lost some benefit. No longer has diarrhea from it. the dose is 0.6 mg bid.     Came off otezla last year when she had severe staff infection, there was drug drug interaction with vancomycin. Wants to go back on it.     Skin lesions over chest are clearing up. Has skin lesions over her legs.     She is off of otezla >1 yr. She had daily vomiting and abdominal cramps initially which later resolved after 2 months.      Today, is her last day liz her health insurnaace  .     Reports pain and swelling liz hands. Drops things, lost strenghth. Veins look inflamed. Hands are swollen in AM and before bedtime. AM stiffness is 2 hours. can t tolerate ibuprofen.     Prednnisonne does not help much.     Wants to try eleve.     Had 2 blood clots over L arm, finished xraalto 4 months ago.      Was told to have severe dry eyes, hs not had eyes checked> 1 yr as was in the hospital with staff infection. systanee nd gentle eyedrops help some, sometimes gets sharp pain and and blurry vision in her eyes from dryness. No h/o uveitis.     has dry mouth, drinks water.     Gets T  F sometimes. It is sporadic.     Lost hair.     Gets intermittent CP. Had several hospital visits and admissions in the [facility] for it. lorazepam helped witch chest spasms, she does not like pain meds.     She was prescribed 0.5 mgq d prn, 10 tbs/monthss.     She gets dry cough, just started using albuterol inhaler, it helps.     No SOB.     Has gastroparesis, IBS  nd h/o severas admission for constiption, colon blockage with impaction and gets bloating. has lost follow up with GI.     She is working with  to get medical assistance to get insurance back by early next year. She filed it again.     Gets botox inj every 3 months nd they help, has to cancel 10/20 botox inj s will not have insurance. they came back yesterday.     last seizure waas  last year. still gets black out spells, salts ne was last week.    derm eye gi       FHx: Both parents have RA.  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 sexually active, not on oral contraceptives. She thinks she had a misccraaaiaage last wk, had n period x2 months then mueller bleeding a lot, dark red and was different from period. Felt something came out that she feels she miscarried, no pos pregnancy test. No smoking. rarely drinks ETOH.  [facility]   thumbs between MCP tender   otezla helped with skin lesions, oral ulcers, joint pain.  colcrys  sjogre aleve biotene voltaren [id number]        8/11/2022: Samara presents for urgent visit to discuss m/o Behcet's flare during pregnancy, she is   She is pregnant 10 wk 3 days with ADRIAN 3/6/2023.  In 2020, had 1st trimester miscarriage.  Has LBP, ankle pain/swelling.   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 Discussed with MFM, back on low dose otezla since last week, only 1 tab a day.    Interval History  10/28/2022  Mrs. Oropeza was last seen 08/11/22. At that time she was advised to continue otezla (1 tablet BID) and remain off colcrys. Since that time, she has instead taken otezla 1 tablet daily. She notes more joint pain in hands, wrists, cervical spine, knees with 4 hours of monring stiffness. Previously <1hr with full strength.     Had a few genital ulcers recently which only lasted a few days. Oral ulcers have been more active of late and are currently healing.     Abdominal pain on L side, pain is always worse after eating. Out of the phase of her pregnancy with morning sickness, at 22w on Monday.     She describes a few fevers at the end of September with hot flashes and cold sweats with Tmax 100. Called OB-GYN who instructed she should go in if persistent, though it resolved within a few days.     More easy bruising - thighs, she is currently on  lovenox and baby aspirin. Previous history of clots especially with interventions. She recently had an issue with superficial thrombophlebitis following a blood draw.     Seeing more floaters in her vision. Sometimes tiny and clear, but can also be larger and 'gray', up to 1/4 of her vision. Has not seen Ophthalmology in many years.     More frequent migraines - did a nerve block recently but this has worn off, previous botox injections.     ADRIAN 03/06/23.    1/25/23:    Baby is growing small. Had high BP yesterday, they would monitor it. DBP was 90.    Increasing otezla to bid helped. It helped with ulcers. Has skin breakdown since Christmas, never had it like this over her chest, but still it feels related to Behcet.    Hands and ankles are painful and swoleln, ankles are new but had hand pain with behcet before,. This started fater entering [address] [place] [address]. Her ADRIAN might be earlier based on baby's growth, induction at 37 wk, progress at 39. nex twk will have another check.    Still has the neck pain. Still gets eye floaters.    6/21/2023:    She delivered 1 month early due to pre-eclampsia. Had Mg infusion, had bleeding issues, spent a week. Was on BP med till a month ago, now stable. Was induced, had NVD. No blood transfusions needed.    Her baby girl Lashanda was born 2/12/2023, small, slow growth but healthy with no fetal deformities. Had vaginal sores after giving birth. Still gets mouth ulcers, one oral ulcer now, no vaginal ulcers now. Plans to breast feed x 7 months. Does breastfeeding. Saw OB/GYN in 4/2023 [facility]. They are aware she is on otezla.    She was on ASA 81 mg every day during pregnancy.    9/27/23: Doing well, less behcet flares since giving birth. Most recent flare on her chest, now healing. No new oral or vaginal ulcers.     She has been having back pain that shoots down her right leg since June and has been utilizing PT without improvement.     She also has had swelling and pain in both  hands and right wrist, as well as both ankles L>R.     She notes missing some doses of otezla due to running out of refills and avoiding taking it without food. She has been eating less because she has been more tired since having her daughter.       2/1/2024:    Has back pain. Had epidural inj one wk ago, has to wait x 2 weeks, if no help to get surgery for bulging disc    Some oral lesions 2wk ago flare with swollen hands, but currently has no oral ulcers.     Hands hurt today with pain level about 4/10. Celebrex did not help much.    No vaginal ulcers    Reports loss of appetite past 5 months    Her baby girl was sick x past 2 weeks, just got better last night, she had RSV. Samara did not get much sleep, did not eat much over past 2 weeks because of this.      6/5/2024:    -active inflammatory arthritis despite adding HCQ last visit. HCQ causes GI upset    -fingers are deforming, new    -not feeling well, has neck pain, nausea x past few days    -still breastfeeding, no pregnancy plans    -missed otezla x 2 days, got some skin lesions back over chest area; otherwise otezla is managing her behcet's sores well    Review of Systems    A comprehensive ROS was done. Positives are per HPI.    Past Medical History   Past Medical History:   Diagnosis Date    Anemia     Anxiety     Arthritis     Behcet's disease (H)     Cervical adenitis May 2010    Chronic abdominal pain     Constipation, chronic 1994    Fibromyalgia     Gastro-oesophageal reflux disease     Gastroparesis     Irregular heart beat     tachycardia, has had workup    Migraines     Neuromuscular disorder (H)     fibramyalgia    Palpitations     PONV (postoperative nausea and vomiting)     Seizure (H)     Seizures (H)     unknown etiology    Syncope     Tourette's       Past Surgical History:   Procedure Laterality Date    ARTHROSCOPY ANKLE, OPEN REPAIR LIGAMENT, COMBINED Left 9/25/2019    Procedure: LEFT ANKLE ARTHROSCOPY WITH LIGAMENT REPAIR;  Surgeon:  Andres Johnson DPM;  Location: [facility]    ARTHROSCOPY ANKLE, REPAIR LIGAMENT Left 1/2/2019    Procedure: Ankle arthroscopy and sinus tarsi evacuation, ligament repair, left lower extremity;  Surgeon: Andres Johnson DPM;  Location: [facility] OR    ARTHROSCOPY KNEE WITH PATELLAR REALIGNMENT  7/25/2013    Procedure: ARTHROSCOPY KNEE WITH PATELLAR REALIGNMENT;  Left Knee Arthroscopy, Medial Patellofemoral Ligament Reconstruction with Allograft  ;  Surgeon: Jennifer Acevedo MD;  Location: [facility]    COLONOSCOPY  2015    DENTAL SURGERY  1996    Teeth removal    ENDOSCOPY UPPER, COLONOSCOPY, COMBINED  2005    HC ESOPH/GAS REFLUX TEST W NASAL IMPED >1 HR N/A 2/15/2017    Procedure: ESOPHAGEAL IMPEDENCE FUNCTION TEST WITH 24 HOUR PH GREATER THAN 1 HOUR;  Surgeon: Timothy Matta MD;  Location: [facility]    IR LUMBAR PUNCTURE  1/22/2024    IR PICC PLACEMENT > 5 YRS OF AGE  3/13/2019    IR UPPER EXTREMITY VENOGRAM LEFT  2/25/2020    IRRIGATION AND DEBRIDEMENT FOOT, COMBINED Left 3/12/2019    Procedure: COMBINED IRRIGATION AND DEBRIDEMENT LEFT ANKLE;  Surgeon: Micha Glover MD;  Location: [facility]    IRRIGATION AND DEBRIDEMENT LOWER EXTREMITY, COMBINED Left 5/7/2019    Procedure: 1.  Excision of wound down to and including deep fascia, less than 20 cm2.  2.  Irrigation and debridement, left ankle.;  Surgeon: Andres Johnson DPM;  Location: [facility] OR    PICC INSERTION Left 05/05/2019    4Fr - 45cm (5cm external), Basilic vein, SVC RA junction      Patient Active Problem List    Diagnosis Date Noted    Preeclampsia 02/10/2023     Priority: Medium    Infection of joint of ankle (H) 05/06/2019     Priority: Medium    Cellulitis 03/09/2019     Priority: Medium    Displacement of lumbar intervertebral disc without myelopathy 11/13/2018     Priority: Medium     Overview:   Created by Conversion      Iron deficiency associated with nonfamilial restless legs syndrome 11/13/2018     Priority: Medium    Enthesopathy of  hip region 11/13/2018     Priority: Medium     Overview:   Created by Conversion      Tourette's syndrome 11/13/2018     Priority: Medium     Overview:   Created by Conversion      Somatic symptom disorder 06/01/2018     Priority: Medium    Pelvic floor weakness 04/25/2018     Priority: Medium    Spells of decreased attentiveness 12/19/2017     Priority: Medium    Mobile cecum (H28) 11/09/2017     Priority: Medium     Cecum noted in Right lower quadrant on 4/17 CT scan, and in Left upper Quadrant on CT on 11/2017.      Vitamin D deficiency 10/11/2017     Priority: Medium     How low, unknown/not found. On D when tested at 27. Starting cholecalciferol October 2017. Needs recheck.      Convulsions, unspecified convulsion type (H) 10/03/2017     Priority: Medium    Transient alteration of awareness 10/03/2017     Priority: Medium    Chronic pain syndrome 07/27/2017     Priority: Medium    Major depressive disorder, recurrent episode, moderate (H) 06/27/2017     Priority: Medium    Cervical pain 05/02/2017     Priority: Medium    Acute left ankle pain 03/31/2017     Priority: Medium    Cervical dystonia 03/28/2017     Priority: Medium    PTSD (post-traumatic stress disorder) 01/17/2017     Priority: Medium    Patellofemoral instability 10/20/2016     Priority: Medium    Fibromyalgia 08/04/2016     Priority: Medium    Rheumatoid arthritis of multiple sites without rheumatoid factor (H) 08/04/2016     Priority: Medium    Raynaud's disease without gangrene 08/04/2016     Priority: Medium    Chronic abdominal pain 08/04/2016     Priority: Medium    Palpitations 01/12/2016     Priority: Medium    On colchicine therapy 10/30/2015     Priority: Medium    Spell of shaking 05/06/2015     Priority: Medium    Migraine 02/04/2015     Priority: Medium    Behcet's disease (H) 12/10/2014     Priority: Medium    Headaches due to old head injury 11/12/2013     Priority: Medium    Milk protein intolerance 10/11/2013     Priority: Medium     Intestinal malabsorption 10/11/2013     Priority: Medium    Concussion 02/13/2013     Priority: Medium     Jan 2013, with prolonged recovery- followed by sports med        Knee pain 01/03/2013     Priority: Medium    Generalized anxiety disorder 06/25/2009     Priority: Medium    Tics - Tourette syndrome 05/18/2009     Priority: Medium     Followed by psychotherapy. Symptoms well managed. Originally diagnosed at [facility] neurolog[facility]. (Dr. Simpson)          IBS (irritable bowel syndrome) 05/18/2009     Priority: Medium    Allergic rhinitis 05/18/2009     Priority: Medium    GERD (gastroesophageal reflux disease) 01/10/2008     Priority: Medium    Gastroparesis 1994     Priority: Medium      Family History   Problem Relation Age of Onset    Depression Mother     Neurologic Disorder Mother         Migraines, take imitrex injection.  Also in maternal grandmother.      Alcohol/Drug Father     Hypertension Father     Depression Father     Osteoarthritis Father     Cardiovascular Maternal Grandmother     Depression Maternal Grandmother     Hypertension Maternal Grandmother     Alzheimer Disease Maternal Grandmother     Cardiovascular Maternal Grandfather     Hypertension Maternal Grandfather     Depression Maternal Grandfather     Alcohol/Drug Maternal Grandfather     Diabetes Maternal Grandfather     Cardiovascular Paternal Grandmother     Hypertension Paternal Grandmother     Cardiovascular Paternal Grandfather     Hypertension Paternal Grandfather     Glaucoma No family hx of     Macular Degeneration No family hx of       Allergies   Allergen Reactions    Amoxil [Penicillins] Rash     Dad unsure of reaction.    Bee Venom Anaphylaxis    Bioflavonoids Anaphylaxis    Citrus Anaphylaxis     All Punaluu    Contrast Dye Rash     Contrast Media Ready-Box [facility] [place], 04/09/2014.; Contrast Media Ready-Box [facility], 04/09/2014.  NOTE: this is a contrast media oral with iodine. Premedicate with methylpred standard for IV contrast,  request barium contrast for oral contrast.    Iodinated Contrast Media Hives and Rash     Contrast Media Ready-Box [facility] [place]09[place]2014.; Contrast Media Ready-Box [facility] [place]09/2014.  NOTE: this is a contrast media oral with iodine. Premedicate with methylpred standard for IV contrast, request barium contrast for oral contrast.    Pineapple Anaphylaxis, Difficulty breathing and Rash    Reglan [Metoclopramide] Other (See Comments)     IV dose only, in ER, rapid heart rate.    Ace Inhibitors      Difficulty in breathing and GI upset    Amitiza [Lubiprostone] Nausea and Vomiting    Amoxicillin-Pot Clavulanate     Midazolam Unknown     parent states that when pt takes this medication, she wakes up being very violent .    Midazolam Hcl      Coming out of pelvic exam at age of 5, was kicking and screaming when coming out of the versed.    Other [No Clinical Screening - See Comments]      Bleech/ chest tightness, itchy throat, swollen tongue, hives    Tizanidine Other (See Comments)     Confusion, back pain, photophobia, abdominal pain, shaking, anxious      Adhesive Tape Rash    Azithromycin Hives and Rash    Cephalexin Itching and Rash    Sulfa Antibiotics Rash     Skin scarring      Current Outpatient Medications   Medication Sig Dispense Refill    albuterol (PROAIR HFA/PROVENTIL HFA/VENTOLIN HFA) 108 (90 Base) MCG/ACT inhaler Inhale 2 puffs into the lungs every 6 hours as needed for shortness of breath / dyspnea or wheezing 1 Inhaler 1    amitriptyline (ELAVIL) 25 MG tablet Take 1 tablet (25 mg) by mouth at bedtime 90 tablet 1    apremilast (OTEZLA) 30 MG tablet Take 1 tablet (30 mg) by mouth 2 times daily Hold for signs of infection, and seek medical attention. 180 tablet 3    artificial tears OINT ophthalmic ointment 0.5 inch strip each eye at night 1 Tube 11    azelaic acid (FINACIA) 15 % external gel Once daily to scars, upper chest and small portion of back and spot on belly button. Hold if irritating 50 g 5     benzocaine (AMERICAINE) 20 % external aerosol Apply to perineum four times daily as needed for pain 57 g 3    benzocaine (TOPICALE XTRA) 20 % GEL Apply 1 g to affected area 4 times daily as needed for mouth sores. 30 g 5    betamethasone valerate (VALISONE) 0.1 % cream Apply topically 2 times daily as needed       bisacodyl (DULCOLAX) 5 MG EC tablet Take 2 tablets (10 mg) by mouth daily as needed for constipation 30 tablet 0    citalopram (CELEXA) 20 MG tablet Take 1 tablet (20 mg) by mouth daily 90 tablet 1    EPINEPHrine (EPIPEN 2-EAMON) 0.3 MG/0.3ML injection Inject 0.3 mLs (0.3 mg) into the muscle as needed for anaphylaxis 0.6 mL 3    etonogestrel (NEXPLANON) 68 MG IMPL Inject 68 mg Subcutaneous      fluocinonide (LIDEX) 0.05 % external gel Apply topically 2 times daily. 60 g 11    folic acid (FOLVITE) 1 MG tablet Take 1 tablet (1 mg) by mouth daily 90 tablet 3    gabapentin (NEURONTIN) 300 MG capsule Take 1 capsule (300 mg) by mouth every morning 60 capsule 1    hydrocortisone, Perianal, (ANUSOL-HC) 2.5 % cream Place rectally 3 times daily as needed for hemorrhoids 30 g 0    hydrOXYzine HCl (ATARAX) 25 MG tablet TAKE ONE OR TWO TABLETS BY MOUTH EVERY SIX HOURS AS NEEDED      hypromellose (GENTEAL) 0.3 % SOLN 1 drop every hour as needed for dry eyes       lanolin ointment Apply topically every hour as needed for other (sore nipples) 7 g 3    lidocaine (LMX4) 4 % external cream Apply topically once as needed for mild pain 120 g 1    LINZESS 290 MCG capsule TAKE 1 CAPSULE BY MOUTH EVERY DAY IN THE MORNING BEFORE BREAKFAST 90 capsule 0    mometasone (ELOCON) 0.1 % external ointment Apply topically 2 times daily. 45 g 11    ondansetron (ZOFRAN ODT) 8 MG ODT tab Take 1 tablet (8 mg) by mouth every 8 hours as needed 90 tablet 3    polyethylene glycol (MIRALAX/GLYCOLAX) powder Take 1 capful by mouth 3 times daily      Prenatal MV-Min-Fe Fum-FA-DHA (PRENATAL 1 PO)       rizatriptan (MAXALT-MLT) 5 MG ODT DISSOLVE 1 OR 2  "TABLETS IN THE MOUTH AS NEEDED FOR HEADACHE AT ONSET OF MIGRAINE, MAY REPEAT IN 2 HOURS AS NEEDED. MAX 30MG IN 24 HOURS AND MAX 5 DAYS PER MONTH      sodium fluoride 1.1 % CREA Apply 1 Application topically daily      triamcinolone (KENALOG) 0.1 % external ointment Apply twice daily as needed to lesions on the genitals and body. OK to use very sparingly on the face. 454 g 2    benzocaine (TOPICALE XTRA) 20 % GEL Apply as needed locally to mouth or nasal ulcers for pain; 4 times daily as needed 30 g 1    hydroquinone (ARACELY) 4 % external cream Apply once daily for 3 months. Stop for one month. If further lightening is desired, proceed with one additional month of treatment. 28 g 1    lactulose 20 GM/30ML SOLN Take 30 mLs by mouth 3 times daily as needed (for constipation) 300 mL 3    methotrexate sodium, PF, 50 MG/2ML SOLN injection Inject 0.4 mLs (10 mg) Subcutaneous every 7 days 10 mL 1    Sharps Container MISC Use for methotreaxte shots 1 each 11    sucralfate (CARAFATE) 1 GM/10ML suspension Take 10 mLs (1 g) by mouth 4 times daily 1200 mL 2    syringe/needle, sisp, 25G X 5/8\" 1 ML MISC Inject 1 Syringe Subcutaneous every 7 days Use with Methotrexate 100 each 3    vitamin D2 (ERGOCALCIFEROL) 95834 units (1250 mcg) capsule Take 1 capsule (50,000 Units) by mouth every 7 days (Patient not taking: Reported on 8/27/2024) 12 capsule 0           Physical Exam   Ht 1.6 m (5' 3\")   Wt 63 kg (139 lb)   BMI 24.62 kg/m    GA: NAD, pleasant  HEENT: nl sclera/conj  MSK: can't extend 5th fingers and now new deformity of 4th finger, ulnar deviation of fingers, no major swelling over hands  Skin: no rash  Neuro: non focal  Psych: nl affect      There is currently no information documented on the homunculus. Go to the Rheumatology activity and complete the homunculus joint exam.  Joint Exam 09/18/2024     No joint exam has been documented for this visit          Data   Data      10/28/2022   BEAUCHAMP-28 (ESR) --   BEAUCHAMP-28 (CRP) -- "   Tender (BEAUCHAMP-28) 2 / 28    Swollen (BEAUCHAMP-28) 0 / 28    Provider Global --   Patient Global --   ESR 17 mm/hr   CRP --     No results found for any visits on 09/18/24.  CBC RESULTS: Recent Labs   Lab Test 09/07/20  1147   WBC 4.1   RBC 5.09   HGB 13.0   HCT 43.0   MCV 85   MCH 25.5*   MCHC 30.2*   RDW 15.0        TSH   Date Value Ref Range Status   09/07/2020 0.77 0.40 - 4.00 mU/L Final   03/21/2019 0.53 0.40 - 4.00 mU/L Final   10/30/2018 1.06 0.40 - 4.00 mU/L Final   11/26/2016 0.87 0.40 - 4.00 mU/L Final     T4 Free   Date Value Ref Range Status   01/31/2007 1.26 0.70 - 1.85 ng/dL Final     Rheumatoid Factor   Date Value Ref Range Status   02/06/2024 <10 <14 IU/mL Final   09/30/2020 <7 <12 IU/mL Final   05/06/2016 <20 <20 IU/mL Final       Reviewed Rheumatology lab flowsheet

## 2024-09-23 ENCOUNTER — TELEPHONE (OUTPATIENT)
Dept: RHEUMATOLOGY | Facility: CLINIC | Age: 29
End: 2024-09-23
Payer: COMMERCIAL

## 2024-09-23 NOTE — TELEPHONE ENCOUNTER
Left Voicemail (1st Attempt) and Sent Mychart (1st Attempt) for the patient to call back and schedule the following:    Appointment type: Return Rheumatology in person  Provider: Dr. Sosa  Return date: January 18th, 2025  Specialty phone number: [phone]  Additional appointment(s) needed: NA  Additonal Notes: NA

## 2024-09-25 NOTE — TELEPHONE ENCOUNTER
Left Voicemail (2nd Attempt) for the patient to call back and schedule the following:    Appointment type: Return Rheumatology  Provider: Dr. Sosa  Return date: January 18th, 2025  Specialty phone number: [phone]  Additional appointment(s) needed: NA  Additonal Notes: NA     WKC-16B Form was received by the Work Comp Completion Team - HIM, [address]

## 2024-11-12 ENCOUNTER — OFFICE VISIT (OUTPATIENT)
Dept: PHYSICAL MEDICINE AND REHAB | Facility: CLINIC | Age: 29
End: 2024-11-12
Payer: COMMERCIAL

## 2024-11-12 VITALS — DIASTOLIC BLOOD PRESSURE: 72 MMHG | HEART RATE: 106 BPM | SYSTOLIC BLOOD PRESSURE: 99 MMHG

## 2024-11-12 DIAGNOSIS — M79.18 MYOFASCIAL PAIN: ICD-10-CM

## 2024-11-12 DIAGNOSIS — M54.81 BILATERAL OCCIPITAL NEURALGIA: ICD-10-CM

## 2024-11-12 DIAGNOSIS — G43.719 CHRONIC MIGRAINE WITHOUT AURA, INTRACTABLE, WITHOUT STATUS MIGRAINOSUS: Primary | ICD-10-CM

## 2024-11-12 RX ORDER — TRIAMCINOLONE ACETONIDE 40 MG/ML
40 INJECTION, SUSPENSION INTRA-ARTICULAR; INTRAMUSCULAR ONCE
Status: COMPLETED | OUTPATIENT
Start: 2024-11-12 | End: 2024-11-12

## 2024-11-12 RX ORDER — BUPIVACAINE HYDROCHLORIDE 5 MG/ML
10 INJECTION, SOLUTION PERINEURAL ONCE
Status: COMPLETED | OUTPATIENT
Start: 2024-11-12 | End: 2024-11-12

## 2024-11-12 RX ADMIN — BUPIVACAINE HYDROCHLORIDE 50 MG: 5 INJECTION, SOLUTION PERINEURAL at 14:51

## 2024-11-12 RX ADMIN — TRIAMCINOLONE ACETONIDE 40 MG: 40 INJECTION, SUSPENSION INTRA-ARTICULAR; INTRAMUSCULAR at 14:51

## 2024-11-12 NOTE — PROGRESS NOTES
[facility]    PM&R CLINIC NOTE  BOTULINUM TOXIN PROCEDURE      HPI  Chief Complaint   Patient presents with    Procedure     BOTOX     Samara Oropeza is a 29 year old female who presents to clinic for botulinum toxin injections for management of chronic migraine headaches.     SINCE LAST VISIT  Samara Orpoeza was last seen here in clinic on 8/20/24, at which time she received 200 units of Botox as well as a nerve block in the greater and lesser occipital nerves    Patient denies new medical diagnoses, illnesses, hospitalizations, emergency room visits, and injuries since the previous injection with botulinum neurotoxin. She continues physical therapy for her lower back since she had surgery on 4/30/2024.     RESPONSE TO PREVIOUS TREATMENT  Side effects: Issues lifting her head off of a pillow for about a month .     1.  Headache frequency during this injection cycle: She reports one full month with no migraine headaches but daily migraine before and after the injections. This is compared to her baseline headache frequency of 30 headache days per month.     2.  Headache duration during this injection cycle:  Headache duration was usually a few hours to a few days . Patient reports a few episodes of multiple day headaches during this injection cycle, particularly as the Botox was wearing off.     3.  Headache intensity during this injection cycle:    A.  7-8/10  =  Typical pain level.  B.  8/10  =  Worst pain level - this has only occurred over the last two weeks as Botox has been wearing off.   C.  2/10  =  Lowest pain level.    4.  Change in headache medication usage during this injection cycle:  (For Example:  Able to decrease use of oral pain medications.) She has been taking Tylenol and ibuprofen as needed for her migraine headaches. She will take rizatriptan if the OTC medications do not work.     5.  ER Visits During This Injection Cycle:  None.     6.  Functional  Performance:  Change in ADL's, social interaction, days lost from work, etc. Patient reports being able to more fully participate in social and family activities and responsibilities as headache symptoms have improved.      PHYSICAL EXAM  VS: BP 99/72 (BP Location: Right arm, Patient Position: Sitting)   Pulse 106    GEN: Pleasant and cooperative, in no acute distress  HEENT: No facial asymmetry    ALLERGIES  Allergies   Allergen Reactions    Amoxil [Penicillins] Rash     Dad unsure of reaction.    Bee Venom Anaphylaxis    Bioflavonoids Anaphylaxis    Citrus Anaphylaxis     All Ida    Contrast Dye Rash     Contrast [place] [facility] [place] [place]  NOTE: this is a contrast media oral with iodine. Premedicate with methylpred standard for IV contrast, request barium contrast for oral contrast.    Iodinated Contrast Media Hives and Rash     Contrast [facility] [place] Contrast [facility] [place]  NOTE: this is a contrast media oral with iodine. Premedicate with methylpred standard for IV contrast, request barium contrast for oral contrast.    Pineapple Anaphylaxis, Difficulty breathing and Rash    Reglan [Metoclopramide] Other (See Comments)     IV dose only, in ER, rapid heart rate.    Ace Inhibitors      Difficulty in breathing and GI upset    Amitiza [Lubiprostone] Nausea and Vomiting    Amoxicillin-Pot Clavulanate     Midazolam Unknown     parent states that when pt takes this medication, she wakes up being very violent .    Midazolam Hcl      Coming out of pelvic exam at age of 5, was kicking and screaming when coming out of the versed.    Other [No Clinical Screening - See Comments]      Bleech/ chest tightness, itchy throat, swollen tongue, hives    Tizanidine Other (See Comments)     Confusion, back pain, photophobia, abdominal pain, shaking, anxious      Adhesive Tape Rash    Azithromycin Hives and Rash    Cephalexin Itching and  Rash    Sulfa Antibiotics Rash     Skin scarring       CURRENT MEDICATIONS    Current Outpatient Medications:     albuterol (PROAIR HFA/PROVENTIL HFA/VENTOLIN HFA) 108 (90 Base) MCG/ACT inhaler, Inhale 2 puffs into the lungs every 6 hours as needed for shortness of breath / dyspnea or wheezing, Disp: 1 Inhaler, Rfl: 1    amitriptyline (ELAVIL) 25 MG tablet, Take 1 tablet (25 mg) by mouth at bedtime, Disp: 90 tablet, Rfl: 1    apremilast (OTEZLA) 30 MG tablet, Take 1 tablet (30 mg) by mouth 2 times daily Hold for signs of infection, and seek medical attention., Disp: 180 tablet, Rfl: 3    artificial tears OINT ophthalmic ointment, 0.5 inch strip each eye at night, Disp: 1 Tube, Rfl: 11    azelaic acid (FINACIA) 15 % external gel, Once daily to scars, upper chest and small portion of back and spot on belly button. Hold if irritating, Disp: 50 g, Rfl: 5    benzocaine (AMERICAINE) 20 % external aerosol, Apply to perineum four times daily as needed for pain, Disp: 57 g, Rfl: 3    benzocaine (TOPICALE XTRA) 20 % GEL, Apply 1 g to affected area 4 times daily as needed for mouth sores., Disp: 30 g, Rfl: 5    benzocaine (TOPICALE XTRA) 20 % GEL, Apply as needed locally to mouth or nasal ulcers for pain; 4 times daily as needed, Disp: 30 g, Rfl: 1    betamethasone valerate (VALISONE) 0.1 % cream, Apply topically 2 times daily as needed , Disp: , Rfl:     bisacodyl (DULCOLAX) 5 MG EC tablet, Take 2 tablets (10 mg) by mouth daily as needed for constipation, Disp: 30 tablet, Rfl: 0    citalopram (CELEXA) 20 MG tablet, Take 1 tablet (20 mg) by mouth daily, Disp: 90 tablet, Rfl: 1    EPINEPHrine (EPIPEN 2-EAMON) 0.3 MG/0.3ML injection, Inject 0.3 mLs (0.3 mg) into the muscle as needed for anaphylaxis, Disp: 0.6 mL, Rfl: 3    etonogestrel (NEXPLANON) 68 MG IMPL, Inject 68 mg Subcutaneous, Disp: , Rfl:     fluocinonide (LIDEX) 0.05 % external gel, Apply topically 2 times daily., Disp: 60 g, Rfl: 11    folic acid (FOLVITE) 1 MG tablet,  Take 1 tablet (1 mg) by mouth daily, Disp: 90 tablet, Rfl: 3    gabapentin (NEURONTIN) 300 MG capsule, Take 1 capsule (300 mg) by mouth every morning, Disp: 60 capsule, Rfl: 1    hydroquinone (ARACELY) 4 % external cream, Apply once daily for 3 months. Stop for one month. If further lightening is desired, proceed with one additional month of treatment., Disp: 28 g, Rfl: 1    hydrOXYzine HCl (ATARAX) 25 MG tablet, TAKE ONE OR TWO TABLETS BY MOUTH EVERY SIX HOURS AS NEEDED, Disp: , Rfl:     hypromellose (GENTEAL) 0.3 % SOLN, 1 drop every hour as needed for dry eyes , Disp: , Rfl:     lactulose 20 GM/30ML SOLN, Take 30 mLs by mouth 3 times daily as needed (for constipation), Disp: 300 mL, Rfl: 3    lanolin ointment, Apply topically every hour as needed for other (sore nipples), Disp: 7 g, Rfl: 3    lidocaine (LMX4) 4 % external cream, Apply topically once as needed for mild pain, Disp: 120 g, Rfl: 1    LINZESS 290 MCG capsule, TAKE 1 CAPSULE BY MOUTH EVERY DAY IN THE MORNING BEFORE BREAKFAST, Disp: 90 capsule, Rfl: 0    methotrexate sodium, PF, 50 MG/2ML SOLN injection, Inject 0.4 mLs (10 mg) Subcutaneous every 7 days, Disp: 10 mL, Rfl: 1    mometasone (ELOCON) 0.1 % external ointment, Apply topically 2 times daily., Disp: 45 g, Rfl: 11    ondansetron (ZOFRAN ODT) 8 MG ODT tab, Take 1 tablet (8 mg) by mouth every 8 hours as needed, Disp: 90 tablet, Rfl: 3    polyethylene glycol (MIRALAX/GLYCOLAX) powder, Take 1 capful by mouth 3 times daily, Disp: , Rfl:     Prenatal MV-Min-Fe Fum-FA-DHA (PRENATAL 1 PO), , Disp: , Rfl:     rizatriptan (MAXALT-MLT) 5 MG ODT, DISSOLVE 1 OR 2 TABLETS IN THE MOUTH AS NEEDED FOR HEADACHE AT ONSET OF MIGRAINE, MAY REPEAT IN 2 HOURS AS NEEDED. MAX 30MG IN 24 HOURS AND MAX 5 DAYS PER MONTH, Disp: , Rfl:     Sharps Container MISC, Use for methotreaxte shots, Disp: 1 each, Rfl: 11    sodium fluoride 1.1 % CREA, Apply 1 Application topically daily, Disp: , Rfl:     sucralfate (CARAFATE) 1 GM/10ML  "suspension, Take 10 mLs (1 g) by mouth 4 times daily, Disp: 1200 mL, Rfl: 2    syringe/needle, sisp, 25G X 5/8\" 1 ML MISC, Inject 1 Syringe Subcutaneous every 7 days Use with Methotrexate, Disp: 100 each, Rfl: 3    triamcinolone (KENALOG) 0.1 % external ointment, Apply twice daily as needed to lesions on the genitals and body. OK to use very sparingly on the face., Disp: 454 g, Rfl: 2    hydrocortisone, Perianal, (ANUSOL-HC) 2.5 % cream, Place rectally 3 times daily as needed for hemorrhoids, Disp: 30 g, Rfl: 0    Current Facility-Administered Medications:     botulinum toxin type A (BOTOX) 100 units injection 200 Units, 200 Units, Intramuscular, Q90 Days, Alejandrina Hernandez MD    botulinum toxin type A (BOTOX) 100 units injection 200 Units, 200 Units, Intramuscular, Q90 Days, Alejandrina Hernandez MD, 200 Units at 24 1535    triamcinolone (KENALOG-40) injection 40 mg, 40 mg, , , Fleischmann, Andres, DPM, 40 mg at 10/11/21 0928    triamcinolone (KENALOG-40) injection 40 mg, 40 mg, , , Fleischlesvia, Andres, DPM, 40 mg at 06/15/21 0957    triamcinolone (KENALOG-40) injection 40 mg, 40 mg, , , FleischAnrdes lakhani, DPM, 40 mg at 09/15/20 1554       BOTULINUM NEUROTOXIN INJECTION PROCEDURES    VERIFICATION OF PATIENT IDENTIFICATION AND PROCEDURE     Initials   Patient Name SES   Patient  SES   Procedure Verified by: SES     Prior to the start of the procedure and with procedural staff participation, I verbally confirmed the patient s identity using two indicators, relevant allergies, that the procedure was appropriate and matched the consent or emergent situation, and that the correct equipment/implants were available. Immediately prior to starting the procedure I conducted the Time Out with the procedural staff and re-confirmed the patient s name, procedure, and site/side. (The Joint Commission universal protocol was followed.)  Yes    Sedation (Moderate or Deep): None    ABOVE ASSESSMENTS PERFORMED " BY    Alejandrina Hernandez MD    INDICATIONS FOR PROCEDURES  Samara Oropeza is a 29 year old patient with pain secondary to the diagnosis of chronic migraine headaches. Her baseline symptoms have been recalcitrant to oral medications and conservative therapy.  She is here today for reinjection with Botox.    GOAL OF PROCEDURE  The goal of this procedure is to decrease pain.      TOTAL DOSE ADMINISTERED  Dose Administered:  200 units  Botox (Botulinum Toxin Type A)       2:1 Dilution   Unavoidable Drug Waste: No.  Diluent Used:  Preservative Free Normal Saline  Total Volume of Diluent Used:  4 ml  NDC #: Botox 100u ([address]-21)      CONSENT  The risks, benefits, and treatment options were discussed with Samara Oropeza and she agreed to proceed.    Written consent was obtained by  [facility] .     EQUIPMENT USED  Needle-25mm stimulating/recording  Needle-30 gauge  EMG/NCS Machine    SKIN PREPARATION  Skin preparation was performed using an alcohol wipe.    GUIDANCE DESCRIPTION  Electro-myographic guidance was necessary throughout the neck portion of the procedure to accurately identify all areas of spastic muscles while avoiding injection of non-spastic muscles, neighboring nerves and nearby vascular structures.     AREA/MUSCLE INJECTED:  200 UNITS BOTOX = TOTAL DOSE, 2:1 DILUTION     1. SHOULDER GIRDLE & NECK MUSCLES: 30 UNITS BOTOX = TOTAL DOSE    Right Levator Scapulae - 5 units of Botox over 2 site/s.   Left Levator Scapulae - 5 units of Botox over 1 site/s.    Right Sternocleidomastoid - 2.5 units of Botox over 1 site/s.   Left Sternocleidomastoid - 2.5 units of Botox over 1 site/s.     Right Rhomboid Major - 5 units of Botox over 1 site/s.    Left Rhomboid Major - 5 units of Botox over 1 site/s.      Right Pectoralis Minor - 2.5 units of Botox over 1 site/s.    Left Pectoralis Minor - 2.5 units of Botox over 1 site/s.     2. FACIAL & SCALP MUSCLES: 170 UNITS BOTOX = TOTAL DOSE     Right Occipitalis - 20  units of Botox over 2 site/s.   Left Occipitalis - 20 units of Botox over 2 site/s.     Right Frontalis - 10 units of Botox over 2 site/s.  Left Frontalis - 10 units of Botox over 2 site/s.     Right Temporalis - 50 units of Botox over 8 site/s.  Left Temporalis - 50 units of Botox over 8 site/s.     Right  - 2.5 units of Botox over 1 site/s.              Left  - 2.5 units of Botox over 1 site/s.                 Procerus - 5 units of Botox at 1 site/s.      BILATERAL GREATER & LESSER OCCIPITAL NERVE BLOCKS, TRIGGER POINT INJECTIONS  1% lidocaine: 2 ml  0.5% bupivacaine: 10 ml  [place] ml = 40 mg      ONB: Area just inferior to insertion of the right and left superior trapezius insertion onto skull was cleansed with ChloraPrep. Needle was advanced anteriorly to base of skull then slightly withdrawn and injectate was injected in a fan-like distribution at different depths. An 8 ml mixture of 1% lidocaine, and methylprednisolone was divided into two 5 ml syringes. Total injection volume = 4 ml per side.     TPI: Areas of the skin were prepped with ChloraPrep.  Using standard precautions, a 30-gauge 1-inch needle was used to inject a 3 ml mixture of 1% lidocaine and 0.5% bupivacaine into the upper trapezius and rhomboid major/minor muscles bilaterally for a total of 8 sites.         RESPONSE TO PROCEDURE  Samara Oropeza tolerated the procedure well and there were no immediate complications. She was allowed to recover for an appropriate period of time and was discharged home in stable condition.      ASSESSMENT AND PLAN   Botulinum toxin injections: No changes made to Botox dose or distribution today. Occipital nerve blocks and trigger point injections also administered per patient request to address occipital neuralgia and myofascial pain. Patient will continue to monitor response to Botox and report at next appointment.   Referrals: None.   Medications: No changes.   Follow up: Samara QUICK  Sandip was rescheduled for the next series of injections in 12 weeks, at which time we will evaluate response to today's injections. she may call the clinic prior with any questions or concerns prior to the next appointment.       Patient seen and discussed with Dr. Mary Quispe MD  Neuromodulation/Movement Disorders Fellow      I, Alejandrina Hernandez MD, saw this patient with movement disorder fellow, Dr. Quispe, and agree with the findings and plan of care as documented in this note. I personally reviewed the chart (vitals signs, medications, labs and imaging). My key findings and key management decisions made are reflected in this note.  I was present for the entire procedure listed above.      Alejandrina Hernandez MD

## 2024-11-12 NOTE — LETTER
11/12/2024      Samara Oropeza  [address]  [place] [id number]      Dear Colleague,    Thank you for referring your patient, Samara Oropeza, to the [facility]. Please see a copy of my visit note below.      [facility]    PM&R CLINIC NOTE  BOTULINUM TOXIN PROCEDURE      HPI  Chief Complaint   Patient presents with     Procedure     BOTOX     Samara Oropeza is a 29 year old female who presents to clinic for botulinum toxin injections for management of chronic migraine headaches.     SINCE LAST VISIT  Samara Oropeza was last seen here in clinic on 8/20/24, at which time she received 200 units of Botox as well as a nerve block in the greater and lesser occipital nerves    Patient denies new medical diagnoses, illnesses, hospitalizations, emergency room visits, and injuries since the previous injection with botulinum neurotoxin. She continues physical therapy for her lower back since she had surgery on 4/30/2024.     RESPONSE TO PREVIOUS TREATMENT  Side effects: Issues lifting her head off of a pillow for about a month .     1.  Headache frequency during this injection cycle: She reports one full month with no migraine headaches but daily migraine before and after the injections. This is compared to her baseline headache frequency of 30 headache days per month.     2.  Headache duration during this injection cycle:  Headache duration was usually a few hours to a few days . Patient reports a few episodes of multiple day headaches during this injection cycle, particularly as the Botox was wearing off.     3.  Headache intensity during this injection cycle:    A.  7-8/10  =  Typical pain level.  B.  8/10  =  Worst pain level - this has only occurred over the last two weeks as Botox has been wearing off.   C.  2/10  =  Lowest pain level.    4.  Change in headache medication usage during this injection cycle:  (For  Example:  Able to decrease use of oral pain medications.) She has been taking Tylenol and ibuprofen as needed for her migraine headaches. She will take rizatriptan if the OTC medications do not work.     5.  ER Visits During This Injection Cycle:  None.     6.  Functional Performance:  Change in ADL's, social interaction, days lost from work, etc. Patient reports being able to more fully participate in social and family activities and responsibilities as headache symptoms have improved.      PHYSICAL EXAM  VS: BP 99/72 (BP Location: Right arm, Patient Position: Sitting)   Pulse 106    GEN: Pleasant and cooperative, in no acute distress  HEENT: No facial asymmetry    ALLERGIES  Allergies   Allergen Reactions     Amoxil [Penicillins] Rash     Dad unsure of reaction.     Bee Venom Anaphylaxis     Bioflavonoids Anaphylaxis     Citrus Anaphylaxis     All [place]     Contrast Dye Rash     Contrast Media Ready-[facility] [place]09[place]2014.; Contrast Media Ready-[facility] [place] 04/09/2014.  NOTE: this is a contrast media oral with iodine. Premedicate with methylpred standard for IV contrast, request barium contrast for oral contrast.     Iodinated Contrast Media Hives and Rash     Contrast Media Ready-[facility] [place] 04/09/2014.; Contrast Media Ready-[facility] [place] 04/09/2014.  NOTE: this is a contrast media oral with iodine. Premedicate with methylpred standard for IV contrast, request barium contrast for oral contrast.     Pineapple Anaphylaxis, Difficulty breathing and Rash     Reglan [Metoclopramide] Other (See Comments)     IV dose only, in ER, rapid heart rate.     Ace Inhibitors      Difficulty in breathing and GI upset     Amitiza [Lubiprostone] Nausea and Vomiting     Amoxicillin-Pot Clavulanate      Midazolam Unknown     parent states that when pt takes this medication, she wakes up being very violent .     Midazolam Hcl      Coming out of pelvic exam at age of 5, was kicking and screaming when coming out of the versed.     Other [No  Clinical Screening - See Comments]      Bleech/ chest tightness, itchy throat, swollen tongue, hives     Tizanidine Other (See Comments)     Confusion, back pain, photophobia, abdominal pain, shaking, anxious       Adhesive Tape Rash     Azithromycin Hives and Rash     Cephalexin Itching and Rash     Sulfa Antibiotics Rash     Skin scarring       CURRENT MEDICATIONS    Current Outpatient Medications:      albuterol (PROAIR HFA/PROVENTIL HFA/VENTOLIN HFA) 108 (90 Base) MCG/ACT inhaler, Inhale 2 puffs into the lungs every 6 hours as needed for shortness of breath / dyspnea or wheezing, Disp: 1 Inhaler, Rfl: 1     amitriptyline (ELAVIL) 25 MG tablet, Take 1 tablet (25 mg) by mouth at bedtime, Disp: 90 tablet, Rfl: 1     apremilast (OTEZLA) 30 MG tablet, Take 1 tablet (30 mg) by mouth 2 times daily Hold for signs of infection, and seek medical attention., Disp: 180 tablet, Rfl: 3     artificial tears OINT ophthalmic ointment, 0.5 inch strip each eye at night, Disp: 1 Tube, Rfl: 11     azelaic acid (FINACIA) 15 % external gel, Once daily to scars, upper chest and small portion of back and spot on belly button. Hold if irritating, Disp: 50 g, Rfl: 5     benzocaine (AMERICAINE) 20 % external aerosol, Apply to perineum four times daily as needed for pain, Disp: 57 g, Rfl: 3     benzocaine (TOPICALE XTRA) 20 % GEL, Apply 1 g to affected area 4 times daily as needed for mouth sores., Disp: 30 g, Rfl: 5     benzocaine (TOPICALE XTRA) 20 % GEL, Apply as needed locally to mouth or nasal ulcers for pain; 4 times daily as needed, Disp: 30 g, Rfl: 1     betamethasone valerate (VALISONE) 0.1 % cream, Apply topically 2 times daily as needed , Disp: , Rfl:      bisacodyl (DULCOLAX) 5 MG EC tablet, Take 2 tablets (10 mg) by mouth daily as needed for constipation, Disp: 30 tablet, Rfl: 0     citalopram (CELEXA) 20 MG tablet, Take 1 tablet (20 mg) by mouth daily, Disp: 90 tablet, Rfl: 1     EPINEPHrine (EPIPEN 2-EAMON) 0.3 MG/0.3ML  injection, Inject 0.3 mLs (0.3 mg) into the muscle as needed for anaphylaxis, Disp: 0.6 mL, Rfl: 3     etonogestrel (NEXPLANON) 68 MG IMPL, Inject 68 mg Subcutaneous, Disp: , Rfl:      fluocinonide (LIDEX) 0.05 % external gel, Apply topically 2 times daily., Disp: 60 g, Rfl: 11     folic acid (FOLVITE) 1 MG tablet, Take 1 tablet (1 mg) by mouth daily, Disp: 90 tablet, Rfl: 3     gabapentin (NEURONTIN) 300 MG capsule, Take 1 capsule (300 mg) by mouth every morning, Disp: 60 capsule, Rfl: 1     hydroquinone (ARACELY) 4 % external cream, Apply once daily for 3 months. Stop for one month. If further lightening is desired, proceed with one additional month of treatment., Disp: 28 g, Rfl: 1     hydrOXYzine HCl (ATARAX) 25 MG tablet, TAKE ONE OR TWO TABLETS BY MOUTH EVERY SIX HOURS AS NEEDED, Disp: , Rfl:      hypromellose (GENTEAL) 0.3 % SOLN, 1 drop every hour as needed for dry eyes , Disp: , Rfl:      lactulose 20 GM/30ML SOLN, Take 30 mLs by mouth 3 times daily as needed (for constipation), Disp: 300 mL, Rfl: 3     lanolin ointment, Apply topically every hour as needed for other (sore nipples), Disp: 7 g, Rfl: 3     lidocaine (LMX4) 4 % external cream, Apply topically once as needed for mild pain, Disp: 120 g, Rfl: 1     LINZESS 290 MCG capsule, TAKE 1 CAPSULE BY MOUTH EVERY DAY IN THE MORNING BEFORE BREAKFAST, Disp: 90 capsule, Rfl: 0     methotrexate sodium, PF, 50 MG/2ML SOLN injection, Inject 0.4 mLs (10 mg) Subcutaneous every 7 days, Disp: 10 mL, Rfl: 1     mometasone (ELOCON) 0.1 % external ointment, Apply topically 2 times daily., Disp: 45 g, Rfl: 11     ondansetron (ZOFRAN ODT) 8 MG ODT tab, Take 1 tablet (8 mg) by mouth every 8 hours as needed, Disp: 90 tablet, Rfl: 3     polyethylene glycol (MIRALAX/GLYCOLAX) powder, Take 1 capful by mouth 3 times daily, Disp: , Rfl:      Prenatal MV-Min-Fe Fum-FA-DHA (PRENATAL 1 PO), , Disp: , Rfl:      rizatriptan (MAXALT-MLT) 5 MG ODT, DISSOLVE 1 OR 2 TABLETS IN THE MOUTH  "AS NEEDED FOR HEADACHE AT ONSET OF MIGRAINE, MAY REPEAT IN 2 HOURS AS NEEDED. MAX 30MG IN 24 HOURS AND MAX 5 DAYS PER MONTH, Disp: , Rfl:      Sharps Container MISC, Use for methotreaxte shots, Disp: 1 each, Rfl: 11     sodium fluoride 1.1 % CREA, Apply 1 Application topically daily, Disp: , Rfl:      sucralfate (CARAFATE) 1 GM/10ML suspension, Take 10 mLs (1 g) by mouth 4 times daily, Disp: 1200 mL, Rfl: 2     syringe/needle, sisp, 25G X 5/8\" 1 ML MISC, Inject 1 Syringe Subcutaneous every 7 days Use with Methotrexate, Disp: 100 each, Rfl: 3     triamcinolone (KENALOG) 0.1 % external ointment, Apply twice daily as needed to lesions on the genitals and body. OK to use very sparingly on the face., Disp: 454 g, Rfl: 2     hydrocortisone, Perianal, (ANUSOL-HC) 2.5 % cream, Place rectally 3 times daily as needed for hemorrhoids, Disp: 30 g, Rfl: 0    Current Facility-Administered Medications:      botulinum toxin type A (BOTOX) 100 units injection 200 Units, 200 Units, Intramuscular, Q90 Days, Alejandrina Hernandez MD     botulinum toxin type A (BOTOX) 100 units injection 200 Units, 200 Units, Intramuscular, Q90 Days, Alejandrina Hernandez MD, 200 Units at 24 1535     triamcinolone (KENALOG-40) injection 40 mg, 40 mg, , , Fleischmann, Andres, DPM, 40 mg at 10/11/21 0928     triamcinolone (KENALOG-40) injection 40 mg, 40 mg, , , Fleischmann, Andres, DPM, 40 mg at 06/15/21 0957     triamcinolone (KENALOG-40) injection 40 mg, 40 mg, , , Fleischmann, Andres, DPM, 40 mg at 09/15/20 1554       BOTULINUM NEUROTOXIN INJECTION PROCEDURES    VERIFICATION OF PATIENT IDENTIFICATION AND PROCEDURE     Initials   Patient Name SES   Patient  SES   Procedure Verified by: SES     Prior to the start of the procedure and with procedural staff participation, I verbally confirmed the patient s identity using two indicators, relevant allergies, that the procedure was appropriate and matched the consent or emergent situation, and " that the correct equipment/implants were available. Immediately prior to starting the procedure I conducted the Time Out with the procedural staff and re-confirmed the patient s name, procedure, and site/side. (The Joint Commission universal protocol was followed.)  Yes    Sedation (Moderate or Deep): None    ABOVE ASSESSMENTS PERFORMED BY    Alejandrina Hernandez MD    INDICATIONS FOR PROCEDURES  Samara Oropeza is a 29 year old patient with pain secondary to the diagnosis of chronic migraine headaches. Her baseline symptoms have been recalcitrant to oral medications and conservative therapy.  She is here today for reinjection with Botox.    GOAL OF PROCEDURE  The goal of this procedure is to decrease pain.      TOTAL DOSE ADMINISTERED  Dose Administered:  200 units  Botox (Botulinum Toxin Type A)       2:1 Dilution   Unavoidable Drug Waste: No.  Diluent Used:  Preservative Free Normal Saline  Total Volume of Diluent Used:  4 ml  NDC #: Botox 100u ([address]-41)      CONSENT  The risks, benefits, and treatment options were discussed with Samara Oropeza and she agreed to proceed.    Written consent was [facility] by  [facility] .     EQUIPMENT USED  Needle-25mm stimulating/recording  Needle-30 gauge  EMG/NCS Machine    SKIN PREPARATION  Skin preparation was performed using an alcohol wipe.    GUIDANCE DESCRIPTION  Electro-myographic guidance was necessary throughout the neck portion of the procedure to accurately identify all areas of spastic muscles while avoiding injection of non-spastic muscles, neighboring nerves and nearby vascular structures.     AREA/MUSCLE INJECTED:  200 UNITS BOTOX = TOTAL DOSE, 2:1 DILUTION     1. SHOULDER GIRDLE & NECK MUSCLES: 30 UNITS BOTOX = TOTAL DOSE    Right Levator Scapulae - 5 units of Botox over 2 site/s.   Left Levator Scapulae - 5 units of Botox over 1 site/s.    Right Sternocleidomastoid - 2.5 units of Botox over 1 site/s.   Left Sternocleidomastoid - 2.5 units of Botox over 1  site/s.     Right Rhomboid Major - 5 units of Botox over 1 site/s.    Left Rhomboid Major - 5 units of Botox over 1 site/s.      Right Pectoralis Minor - 2.5 units of Botox over 1 site/s.    Left Pectoralis Minor - 2.5 units of Botox over 1 site/s.     2. FACIAL & SCALP MUSCLES: 170 UNITS BOTOX = TOTAL DOSE     Right Occipitalis - 20 units of Botox over 2 site/s.   Left Occipitalis - 20 units of Botox over 2 site/s.     Right Frontalis - 10 units of Botox over 2 site/s.  Left Frontalis - 10 units of Botox over 2 site/s.     Right Temporalis - 50 units of Botox over 8 site/s.  Left Temporalis - 50 units of Botox over 8 site/s.     Right  - 2.5 units of Botox over 1 site/s.              Left  - 2.5 units of Botox over 1 site/s.                 Procerus - 5 units of Botox at 1 site/s.      BILATERAL GREATER & LESSER OCCIPITAL NERVE BLOCKS, TRIGGER POINT INJECTIONS  1% lidocaine: 2 ml  0.5% bupivacaine: 10 ml  [place] ml = 40 mg      ONB: Area just inferior to insertion of the right and left superior trapezius insertion onto skull was cleansed with ChloraPrep. Needle was advanced anteriorly to base of skull then slightly withdrawn and injectate was injected in a fan-like distribution at different depths. An 8 ml mixture of 1% lidocaine, and methylprednisolone was divided into two 5 ml syringes. Total injection volume = 4 ml per side.     TPI: Areas of the skin were prepped with ChloraPrep.  Using standard precautions, a 30-gauge 1-inch needle was used to inject a 3 ml mixture of 1% lidocaine and 0.5% bupivacaine into the upper trapezius and rhomboid major/minor muscles bilaterally for a total of 8 sites.         RESPONSE TO PROCEDURE  Samara Oropeza tolerated the procedure well and there were no immediate complications. She was allowed to recover for an appropriate period of time and was discharged home in stable condition.      ASSESSMENT AND PLAN   Botulinum toxin injections: No changes  made to Botox dose or distribution today. Occipital nerve blocks and trigger point injections also administered per patient request to address occipital neuralgia and myofascial pain. Patient will continue to monitor response to Botox and report at next appointment.   Referrals: None.   Medications: No changes.   Follow up: Samara Oropeza was rescheduled for the next series of injections in 12 weeks, at which time we will evaluate response to today's injections. she may call the clinic prior with any questions or concerns prior to the next appointment.       Patient seen and discussed with Dr. Mary Quispe MD  Neuromodulation/Movement Disorders Fellow      I, Alejandrina Hernandez MD, saw this patient with movement disorder fellow, Dr. Quispe, and agree with the findings and plan of care as documented in this note. I personally reviewed the chart (vitals signs, medications, labs and imaging). My key findings and key management decisions made are reflected in this note.  I was present for the entire procedure listed above.      Alejandrina Hernandez MD          Again, thank you for allowing me to participate in the care of your patient.        Sincerely,        Alejandrina Hernandez MD

## 2024-11-12 NOTE — NURSING NOTE
Chief Complaint   Patient presents with    Procedure     SIENA Gutiérrez MA on 11/12/2024 at 10:35 AM

## 2025-01-09 DIAGNOSIS — M79.7 FIBROMYALGIA: ICD-10-CM

## 2025-01-09 DIAGNOSIS — L29.9 PRURITUS: ICD-10-CM

## 2025-01-15 ENCOUNTER — LAB (OUTPATIENT)
Dept: LAB | Facility: CLINIC | Age: 30
End: 2025-01-15
Attending: INTERNAL MEDICINE
Payer: COMMERCIAL

## 2025-01-15 ENCOUNTER — OFFICE VISIT (OUTPATIENT)
Dept: RHEUMATOLOGY | Facility: CLINIC | Age: 30
End: 2025-01-15
Attending: INTERNAL MEDICINE
Payer: COMMERCIAL

## 2025-01-15 VITALS
HEART RATE: 102 BPM | HEIGHT: 63 IN | SYSTOLIC BLOOD PRESSURE: 122 MMHG | DIASTOLIC BLOOD PRESSURE: 82 MMHG | BODY MASS INDEX: 25.52 KG/M2 | WEIGHT: 144 LBS | OXYGEN SATURATION: 98 % | TEMPERATURE: 97.8 F

## 2025-01-15 DIAGNOSIS — M19.90 INFLAMMATORY ARTHRITIS: Primary | ICD-10-CM

## 2025-01-15 DIAGNOSIS — Z51.81 MEDICATION MONITORING ENCOUNTER: ICD-10-CM

## 2025-01-15 DIAGNOSIS — M35.2 BEHCET'S DISEASE (H): ICD-10-CM

## 2025-01-15 DIAGNOSIS — M19.90 INFLAMMATORY ARTHRITIS: ICD-10-CM

## 2025-01-15 DIAGNOSIS — H57.13 EYE PAIN, BILATERAL: ICD-10-CM

## 2025-01-15 LAB
ALBUMIN SERPL BCG-MCNC: 5 G/DL (ref 3.5–5.2)
ALT SERPL W P-5'-P-CCNC: 16 U/L (ref 0–50)
AST SERPL W P-5'-P-CCNC: 22 U/L (ref 0–45)
BASOPHILS # BLD AUTO: 0 10E3/UL (ref 0–0.2)
BASOPHILS NFR BLD AUTO: 0 %
CREAT SERPL-MCNC: 0.64 MG/DL (ref 0.51–0.95)
CRP SERPL-MCNC: <3 MG/L
EGFRCR SERPLBLD CKD-EPI 2021: >90 ML/MIN/1.73M2
EOSINOPHIL # BLD AUTO: 0.1 10E3/UL (ref 0–0.7)
EOSINOPHIL NFR BLD AUTO: 2 %
ERYTHROCYTE [DISTWIDTH] IN BLOOD BY AUTOMATED COUNT: 12.1 % (ref 10–15)
ERYTHROCYTE [SEDIMENTATION RATE] IN BLOOD BY WESTERGREN METHOD: 11 MM/HR (ref 0–20)
HBV CORE AB SERPL QL IA: NONREACTIVE
HBV SURFACE AG SERPL QL IA: NONREACTIVE
HCT VFR BLD AUTO: 44 % (ref 35–47)
HCV AB SERPL QL IA: NONREACTIVE
HGB BLD-MCNC: 14.4 G/DL (ref 11.7–15.7)
IMM GRANULOCYTES # BLD: 0 10E3/UL
IMM GRANULOCYTES NFR BLD: 0 %
LYMPHOCYTES # BLD AUTO: 1.3 10E3/UL (ref 0.8–5.3)
LYMPHOCYTES NFR BLD AUTO: 29 %
MCH RBC QN AUTO: 29.6 PG (ref 26.5–33)
MCHC RBC AUTO-ENTMCNC: 32.7 G/DL (ref 31.5–36.5)
MCV RBC AUTO: 90 FL (ref 78–100)
MONOCYTES # BLD AUTO: 0.2 10E3/UL (ref 0–1.3)
MONOCYTES NFR BLD AUTO: 5 %
NEUTROPHILS # BLD AUTO: 2.9 10E3/UL (ref 1.6–8.3)
NEUTROPHILS NFR BLD AUTO: 64 %
NRBC # BLD AUTO: 0 10E3/UL
NRBC BLD AUTO-RTO: 0 /100
PLATELET # BLD AUTO: 226 10E3/UL (ref 150–450)
RBC # BLD AUTO: 4.87 10E6/UL (ref 3.8–5.2)
WBC # BLD AUTO: 4.6 10E3/UL (ref 4–11)

## 2025-01-15 PROCEDURE — 86803 HEPATITIS C AB TEST: CPT | Performed by: INTERNAL MEDICINE

## 2025-01-15 PROCEDURE — 87340 HEPATITIS B SURFACE AG IA: CPT | Performed by: INTERNAL MEDICINE

## 2025-01-15 PROCEDURE — 82565 ASSAY OF CREATININE: CPT | Performed by: PATHOLOGY

## 2025-01-15 PROCEDURE — 82040 ASSAY OF SERUM ALBUMIN: CPT | Performed by: PATHOLOGY

## 2025-01-15 PROCEDURE — G0463 HOSPITAL OUTPT CLINIC VISIT: HCPCS | Performed by: INTERNAL MEDICINE

## 2025-01-15 PROCEDURE — 84450 TRANSFERASE (AST) (SGOT): CPT | Performed by: PATHOLOGY

## 2025-01-15 PROCEDURE — 85025 COMPLETE CBC W/AUTO DIFF WBC: CPT | Performed by: PATHOLOGY

## 2025-01-15 PROCEDURE — 86704 HEP B CORE ANTIBODY TOTAL: CPT | Performed by: INTERNAL MEDICINE

## 2025-01-15 PROCEDURE — 86140 C-REACTIVE PROTEIN: CPT | Performed by: PATHOLOGY

## 2025-01-15 PROCEDURE — 86481 TB AG RESPONSE T-CELL SUSP: CPT | Performed by: INTERNAL MEDICINE

## 2025-01-15 PROCEDURE — 99000 SPECIMEN HANDLING OFFICE-LAB: CPT | Performed by: PATHOLOGY

## 2025-01-15 PROCEDURE — 36415 COLL VENOUS BLD VENIPUNCTURE: CPT | Performed by: PATHOLOGY

## 2025-01-15 PROCEDURE — 84460 ALANINE AMINO (ALT) (SGPT): CPT | Performed by: PATHOLOGY

## 2025-01-15 PROCEDURE — 85652 RBC SED RATE AUTOMATED: CPT | Performed by: PATHOLOGY

## 2025-01-15 RX ORDER — AZATHIOPRINE 50 MG/1
50 TABLET ORAL
Qty: 30 TABLET | Refills: 1 | Status: SHIPPED | OUTPATIENT
Start: 2025-01-15

## 2025-01-15 ASSESSMENT — PAIN SCALES - GENERAL: PAINLEVEL_OUTOF10: MODERATE PAIN (5)

## 2025-01-15 NOTE — TELEPHONE ENCOUNTER
Medication Requested:  amitriptyline (ELAVIL) 25 MG tablet       Last Written Prescription Date:  6/5/24  Last Fill Quantity: 90,   # refills: 1  ----------------------  Last Office Visit : 9/18/24  Future Office visit:  1/15/25  ----------------------    Refill decision: Medication refilled per protocol.      Magda QUICK RN  [facility] [[email or web address]] Central Nursing/Red Flag Triage & Med Refill Team

## 2025-01-15 NOTE — PATIENT INSTRUCTIONS
Add imuran 1 tab a day to otezla    Labs today and in 4 weeks    Urgent eye clinic appointment    Return video visit in about 3-4 months

## 2025-01-15 NOTE — PROGRESS NOTES
Office Visit Details    Rheumatology Clinic Return Visit Patient     Samara Oropeza MRN# [id number]   YOB: 1995 Age: 29 year old     Date of Visit: 01/15/2025   Last seen: 2024 (virtually)       Assessment & Plan    Assessment & Plan   Samara is a 29 year old  female (ADRIAN 21) with Behçet's disease (pathergy, oral/genital ulcers, polyarthralgia) who presents today for RECHECK (Follow Up)      # Behçet's disease (pathergy, arthralgias, recurrent oral / genital ulcers)  # s/p delivery on 2023 with high-risk pregnancy, complicated by pre-eclampsia,  birth but healthy baby    10/2022:  Mrs. Oropeza arrives for follow-up of her Behçet's disease that is more active at present with recent decrease of her Otezla (60mg --> 30mg) recommended by MFM. Previous discussion with her OB providers had been to decrease to the lowest effective dose, clearly 30mg is not effective as she describes worsened oral ulcers, genital ulcers, arthralgias, abdominal pain, and thrombophlebitis. Although there is not a great deal of evidence for Otezla safety in pregnancy, its mechanism of action would suggest it. Counterbalancing these concerns are what we understand of Behçet's in pregnancy which more often improves but in some cases (~29%) becomes worse, and can flare more often in the 1st trimester and post-lorena period.  She also describes some vision changes, which in the context of Behçet's is conerning. She has not seen Ophthalmology in many years; I will place a referral today.     2023: Stable today, on otezla 30 mg bid, breastfeeding, MFM is ok with breastfeeding, discussed ACR reproductive guideline, it does not recommend otezla during pregnancy and breastfeeding given lack of data; however Samara remained on it during pregnancy and now breastfeeding as stopping it leads to severe flare of her behcet and benefits of staying on it outweighs risks. Her MFM provider is aware  of staying on otezla and is ok with it during breastfeeding.    9/27/23: Doing well, remains on otezla. Has had some interruptions in doses. Today has back and hand pain, and ankle swelling. Will start celebrex, ok with breastfeeding.     2/1/2024:    Behcet's oral/vaginal ulcers are under fair control on otezla, will continue. But she continues to have active inflammatory arthritis, she failed celebrex. Will add  mg bid; risks were discussed. HCQ is safe in pregnancy and breastfeeding in case of pregnancy in future . Will check labs.      6/5/2024:    Active inflammatory arthritis despite adding HCQ in 2/2024, HCQ causes SE, fingers are deforming. Recommend to add methotrexate; risks were discussed. Was informed that it is not allowed in pregnancy and breastfeeding. She will start after she is done with breastfeeding in 8/2024. She prefers sub q inj over oral to avoid GI SEs. Will continue otezla to control oral/genital ulcers. I reviewed labs, stable.    9/18/2024:    Continues to have active inflammatory arthritis, plan is to start methotrexate as soon as she is done with breastfeeding; risks were discussed.    Stopped HCQ in 6/2024 given lack of benefit.    Oral/genital ulcers due to Behcet's are under control on otezla.    New itching, skin rashes are ongoing. I will message her derm team as prescribe topical is not covered by insurance.    Plan:    When you stop breastfeeding, start methotrexate 0.4 ml weekly under skin injection, our pharmacist Natalie (MTM referral) will call you to teach you how to do it    Start folic acid 1 mg a day to help with methotrexate side effects    Stay on otezla    Labs one month after start of methotrexate    Avoid pregnancy on methotrexate    Return in about 3-4 months in person    1/15/24:    Conversations today reveal alfredo is currently in the midst of a flare, with arthralgia of the hands, neck and ankles. She reports having the flares about once a month. This  "episode has been ongoing for \"3-4 days\". She also shared a recent diagnosis of cataracts, as well as the onset of blurry vision and floaters of both eyes which began with this flare. Samara is still weaning her child off of breast feeding, and has not been able to trial methotrexate. We discussed starting Imuran once daily and she was open to the suggestion. There was some concern about bruising following Imuran previously, however, she does not recall such a reaction.     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    In light of her eye findings, we also discussed scheduling an urgent ophthalmology visit to rule out uveitis.     Plan:  - Start Imuran 50 mg once daily; risks were discussed. Per ACR reproductive guidelines, it is safe in pregnancy and breastfeeding  - Continue Otezla  - Labs today including HepB/C serolgy as well as Quantiferon TB testing in case of using remicade in future, as well as follow labs in 4 weeks to monitor effects of Imuran  - Ophthalmology referral made        Plan:    Add imuran 1 tab a day to otezla    Labs today and in 4 weeks    Urgent eye clinic appointment    Return video visit in about 3-4 months    I saw and examined the patient with Dr. Sosa. I acted as scribe for Dr. Sosa.      Stanislav Romero, MS4    Attending Note: I saw and examined the patient with medical student Michelle. This note was written by Michelle, who acted as scribe for me. I agree with findings and recommendations written in this note. The note reflects decisions made by me.       TT 40 min was spent on date of the encounter doing chart review, history and exam, documentation and further activities as noted above. Any prior notes, outside records, laboratory results, and imaging studies were reviewed if relevant.      Disclosure: I earn consulting income from [employer], the " company that manufactures Otezla. I am not involved directly in promoting otezla or doing research with otezla.    Orders Placed This Encounter   Procedures    Erythrocyte sedimentation rate auto    CRP inflammation    ALT    Albumin level    AST    Creatinine    Hepatitis C antibody    Hepatitis B surface antigen    Hepatitis B core antibody    Adult Eye  Referral    CBC with Platelets & Differential    Quantiferon TB Gold Plus      Dominga Sosa MD                 Medical History   History of Present Illness   Samara Oropeza is a 29 year old female who presents for follow-up of her Behçet's.    1/15/2025   - Currently experiencing a flare with arthralgia in her hands, neck, and ankles, ongoing for 3-4 days    - She also reports new vision and floaters both eyes that began this flare, and an Ophthalmology referral was placed as a result    - Currently weaning from breastfeeding and has not yet trialed methotrexate    - 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    9/18/2024:    -reports being in a flare    -hands are pretty painful, bent 5th fingers    -knuckles swell up in AM and at night, reports 3 hours of AM stiffness    -almost done with breastfeeding, has not started the methotrexate yet    -reports severe itching over legs from knees down to legs, no rash, skin looks darker where she is itching, pretty swollen as well    -she never this kind of itching before, reports this started after back surgery    -had back surgery 4/30/2024, decompression of a nerve, it helped with nerve pain, still feels numb/tingly down in her legs    -gets sports over chest, breast, saw derm, was prescribed topical which is not covered by insurance. Next step would be laser tx    Seen Dr. Marilyn Moran Rheumatology in [facility] [place] 10/14:    Original presentation 2014:     Ms Oropeza is a 19 y.o. female who presents with one year of presentation of painful oral ulcers (monthly) once that have  worsened with two episodes in the last two months that presented with painful vulvar or vaginal ulcers (2 episodes so far every month) [not sure if they leave scar] as well that timing coincides with [place] [place]   ORAL ULCERS: last two episodes were severe, painful, white base with erythematous halo, that appear and disappear in the matter of 1-2 weeks without, does not bleed and doesn't respond to any treatment used (magic mouthwash), 10-15 in number with the biggest one being dime size.      VAGINAL ULCERS: 2-3 in number between labia majora and minora that occur simultaneously with oral ulcers, painful, non bleeding ulcers that are erythematous, no pus.      FOLLICULITIS: patient reports having spots in legs specially around ankle and knee, but arms, and neck are affected as well. Small lesions that resemble ingrown hair, most are red erythematous elevated lesions, well demarcated, some with liquid that patient refers as pimple like.      SKIN RASHES: welts and red macules with well defined borders that are pruritic and non-ulcerative on chest, arms and back. Benadryl relieves.      ARTHRALGIAS: In descending order of severity: hips, knees, wrists, shoulders, neck, lumbar, fingers.   C/o morning stiffness in hips, back and neck lasting for about until noon.      Ms. Oropeza reports they present in severe flares and never fully resolve, but do get better. She has seen some swelling and warmth on the affected joints but has not noticed and redness. Has tried Tylenol, ibuprofen, and hydrocodone with not much benefit.      FEVERS: Reports 3-4 sporadic episodes per month of self limited fevers of 38 C that occur during the evenings and associate facial flushing.      HEADACHES: occur daily and are bitemporal and irradiate occipitally, pulsatile in nature, intensity varies from intense to mild, are worsened with movement. On treatment with ibuprofen and somatriptan without any positive results.      VISION  CHANGES: Patient reports that suddenly she would only see a gray blotch in her right eyes and would persist like this for a day and a half. Also reports blurriness in her left eye. Presence of pain and burning sensation of eyeball with associated redness. Has changed prescription glasses in the matter of 2 years 3 times. She has had opthalmology exam and was not suggestive of any evidence of uveitis.   She was also evaluated in ED for a dizzy spell.      ABD PAIN W/ INTERMITTENT DIARRHEA AND CONSTIPATION: Patient reports abdominal pain that is intermittent, periods of 7 days without bowel movement and feeling bloated with change of pattern to diarrhea (liquidy without blood nor mucus, non foul smelling). Has taken Bentyl, Zegrid, and rinetidine with mild clinical improvement.  She also reports small red nodules after each needle stick for blood draw.   Neurology saw her back then and was not impressed with her presentation as physical examination was normal. Also MRI brain normal: Findings: No definite hemorrhage, mass affect, midline shift, or ventriculomegaly is noted.There are a few scattered non specific white matter T2 hyperintensities. Axial diffusion weighted images are unremarkable.     The major vascular structures appear patent. There is opacification and severe mucosal thickening in the right maxillary sinus. The remaining paranasal sinuses, and mastoid air cells are clear. Orbits are unremarkable  She has + HSV-1 IGG. Seen ID. Negative for Herpes simplex virus culture. HSV IGM I/II COMBINATION SENT TO [facility]  RESULTS = <0.91  REF RANGE: <0.91 = NEGATIVE  ID did not think HSV could explain her mouth and genital sores.      CRP within normal limits. HIV negative. CBC within normal limits; UA negative. Creatinine within normal limits   CYNDIE neg.  Antigliadin neg.   ANti TTG neg.   [facility] 2014: Colonoscopy and endoscopy neg; result not available. Not sure if biopsies were done.   Raynaud's phenomenon:  Onset:  about 2013  Digits: all fingers  Digital ulcers: no  Color changes: white ---> purple and red  Duration of an attack: About couple of hours per mom  Pain during attack: not clear pain but bruise like feeling  Frequency of attacks: few times a month  Family history of Raynauds: no  GERD: very long time     No hemoptysis.   No miscarriages/ no thrombosis in past.   No family or personal history of psoriasis, ulcerative colitis or chron's disease.   No buttock pain or low back pain or stiffness in AM     Interim history:  Dec 2014- Multiple mouth ulcers and did not eat for 5 days. This coincided with periods. Colchicine 0.6mg PO BID started in Nov 2014.   Prednisone taper in Dec 20mg to 0 in 4 weeks. She also used magic mouthwash. Kenalog cream. After going to the ER, 3 days later her ulcers were better. She thinks it improved after the menstruation stopped.   LMP 3 weeks ago.   COLCHICINE HAS HELPED A LOT REDUCING THE INTENSITY OF MENSTRUATION ASSOCIATED ORAL AND VULVAR ULCERS.      Interim history: 6/15     Since last visit only two episodes of mouth sores- small sized 6   No genital ulcers since last visit.   Colchicine 1.2 mg in AM and 0.6mg in PM keeps her symptoms under control.      Admitted in 5/15:  Admission Diagnoses:  - LUE weakness  - spell  - hx of migraines  - hx of Tourette syndrome  - hx of Behcet's disease     Discharge Diagnoses:   - spell likely 2/2 anxiety  - hx of migraines  - hx of Tourette syndrome  - hx of Behcet's disease     CT and MRI brain was normal.      Seeing Dr. Lilliana Miramontes soon as outpatient.      Dr. Garcia did not find any intraocular inflammation.       Interm 10/30/2015  ED for migraine. Continues to see neuro - MRI brain without lesions noted. Saw GI - upper barium normal. May be considering repeat colo. Worsening lesions in mouth and genitals. Has had continuous lesion in mouth x 2 months. 2 genital ulcers. Had fracture of right sesamoid in foot. Continues to have low  "grade fevers. New folliculitis on chest, legs and arms.      Interim hx: 1/11/2016    Pt states that since last visit she continues to have oral ulcers and has noted more folliculitis-like lesions on her lower extremities.  She states that on her right lower leg she had a red macular spot that has since resolved.  She denies uveitis.  She states that the colchicine initially helped but then stopped working.  She takes 0.6 mg TID.  She stopped taking her prednisone last week as she feels like this hasn't helped.  She hasn't had any episodes of vaginal ulcers.      She saw GI who stated she has gastroparesis.  She is seeing Cardiology later this week for possible syncopal episodes.       Interim history 5/16  Mouth ulcers X 1 last month  Genital ulcers - none since last visit.      Bilateral MCPs pain, knee pain and low back pain +ve increased since last visit  Morning stiffness X 2 hours (increasing)   5-6/10     \"overall myalgia\" has gotten worse    C/o pseudofolliculitis lesion on anterior shins bilaterally worse     Interim history: (7/18/2016)  Patient has just started Humira for 2 doses on 7/1 and 7/15 and reported minimal improvement of her hip pain but otherwise, did not notice anything different.      She reported painful mouth ulcer once a month since last visit but noticed that it has been getting deeper.   Denied any genital ulcers.     Still has ongoing bilateral MCPs pain, knee pain but this has been intermittent and she did not have any much pain today. She reported 2-3 hours morning stiffness of both hands and pain score of 6/10 at her knees and 8/10 of her hands but currently takes no pain medication except prn ativan which she takes when her muscle is really tight.      The only concern is that she gained weight even though she has not been eating much (eat once a day) and do exercises every day for 1 hour (with video of yoga, pilates). Her mother wonders if she needs to have some labs work up " include sex hormone, thyroid, cortisol.     Interval history 9/14/16  Patient was started Humira at the last visit, patient reports worsening of skin ulcers since starting Humira and stopped taking Humura for the past 2 weeks. Patient reports oral and genital ulcers has been stable even before starting Humira and has not had any interval oral/genital ulcers. However she reports recurrent skin ulcers occurring on a daily basis that affects her chest and anterior legs. Patient also has stopped taking prednisone, she reports that she doesn't feel the prednisone helps, her last dose of prednisone was 6+ months ago. Patient also report worsening low back pain that sometimes causes her to limp.     In the interval patient also visited ED on 8/31/16 for left hand pain and what the patient describes as phlebitis, no medication or procedure was done and the patient was discharged with a splint. Patient also reported 1 episode of chest pressure that was associated with dyspnea and numbness of the left arm, she was shopping in a supermarket at the time of onset, and the pressure resolved after she took a nap.     Patient denies any infectious symptoms in the interval, no fever, chills, night sweat, cough, dysuria, denies eye pain or visual changes.     November 4, 2016  Oct - had one genital ulcer  Oral ulcers X 5- around menstruation time.   Knee pain- chronic on the left side  Dizziness spells - on and off; been to the ER for that in the past.   On and off migraines  July 2013 - s/p MPFL reconstruction plus LRL 7/2013  Morning stiffness in knee (left) X 1 hour     Severe jaw pain and chest pain- patient wondering if this is Tourette's or esophageal spasms. Cardiac workup negative.   Fever      January 13, 2017  [place] [facility] [place] from [place].   Chest pain on and off still continues. Painful with deep breaths.   Oral ulcers - few minor ones lasting for one week;   One major one in roof of mouth  lasting for about one week.   Knee pain +ve - reviewed the recent MRI with her orthopedic surgeon.   On and off migraines  otezla is approved. Still waiting to receive the meds.      March 31, 2017  Otezla current dose 15mg PO BID.   She is tolerating okay at this dose and is willing to increase the dose further up to 30mg BID.   Her joint pains are better on otezla. Knee pain is also better.   Back and neck pain +ve 8/10 when it is worse. Intramuscular botox injections were given on Monday in PMR.   2 minor genital ulcers in the interim- resolved.   Few oral ulcers in the interim- resolved.   Nasal ulcer - resolved.   Migraines on and off.   Nausea with otezla but improving.   Dizziness and blackouts on and off. She fell once and hurt her ankle. Wearing boot in left leg.   Complete ROS negative except for above     May 17, 2017  Tolerating otezla better. Not throwing up anymore. Current dose 20mg in AM and 30mg in PM (2nd week).   Patient thinks that her oral ulcers frequency and severity are improving with otezla. Also no genital ulcers in the interim.   Currently on doxycycline for bronchitis/?pneunomia. 4 more days left.      Joint pain history  2 weeks ago/ dx with pneumonia 1 week ago/  Involved joints: all over  Pain scale: 6.5/10   Wakes the patient from sleep : Yes  Morning stiffness: Yes for 120 minutes  Meds used:otezla,colchicine     Interim history  Since last visit:  1. Infections - Yes/ pneumonia  2. New symptoms/medical problem - Yes/ thinks she may have had a mini-seizure about 10 days ago ; did not seek medical attention; did not reoccur after that. Patient seeing PCP today.  3. Any side effects from Rheum medications -vomiting a lot (resolved)  3. ER visits/Hospitalizations/surgeries - Yes  4. Last PCP visit: has an apt. today     Patient still getting double vision. Floaters present.      Therapist recommended psychiatry evaluation. Patient does not want to see psychaitry. Patient will see PCP  today.      August 22, 2017  Have you ever seen a rheumatologist Yes Who You When 5/17/17     NO genital ulcers in the interim.   Mouth ulcers- three in the back of the throat and roof of the mouth last month.      Joint pain history  Onset: doing alittle worse / cut her otezla back to 30mg  Daily due to GI problems  Involved joints: all over her body. Been having more black out episodes, been having more chest pains.also has raised bumps on both legs from the knees down that itch and are painful   Pain scale:  5.5/10     Wakes the patient from sleep : Yes  Morning stiffness:Yes for 120 minutes  Meds used:otezla, colchicine (three pills daily)     Interim history  Since last visit:  1. Infections - Yes stomach issue  2. New symptoms/medical problem - No  3. Any side effects from Rheum medications -nausea from otezla  3. ER visits/Hospitalizations/surgeries - Yes, ER for foot, ankle injury from passing out and fell down the steps. Hit her head another time from passing out. Alcohol poisoning in July 4. Last PCP visit: 6/23/17 September 19, 2017  Have you ever seen a rheumatologist Yes Who You When 8/22/17  Joint pain history  Onset: pt states that she hurts everywhere for 6 days  Involved joints: all joints  Pain scale:  7/10     Wakes the patient from sleep : Yes/ not sleeping at all  Morning stiffness:Yes for 180 minutes  Meds used:colchicine, otezla, magic mouth wash, lidocaine gel  Last one week: increased joint pain; and genital ulcer  Genital lesion (dimed size) in the last one week  After botox a week ago, she had fever 101 for three days; near syncopes. Back pain; floaters  Chest pain , mouth sores, hand pain, morning pain were all better with otezla 30mg twice daily.     Interim history  Since last visit:  1. Infections - No  2. New symptoms/medical problem - No  3. Any side effects from Rheum medications -nausea from the otezla  3. ER visits/Hospitalizations/surgeries - No  4. Last PCP visit: may 2017       October 18, 2017     Have you ever seen a rheumatologist Yes Who You When 9/19/17  Joint pain history  NO mouth or genital sores in the last 4 weeks.   Medrol dosepak helped with visual symptoms but not joint pains.      Onset: pt states that she is doing better than last time with her behcet's. Today has severe stomach pains, states that she is constipated. Pt had a seizure 2 days ago. Does have a metallic taste in her mouth  Involved joints: hands , neck, shoulders  Pain scale:  9/10 from stomach pain;      Wakes the patient from sleep : Yes  Morning stiffness:Yes for 120 minutes  Meds used:cochicine , otezla 30mg twice daily     Interim history  Since last visit:  1. Infections - No  2. New symptoms/medical problem - Yes/ the cartilage in her chest is inflamed  3. Any side effects from Rheum medications -nausea from the otezla  3. ER visits/Hospitalizations/surgeries - No  4. Last PCP visit: yesterday     January 16, 2018  Have you ever seen a rheumatologist Yes Who You When 10/18/17  Joint pain history  Onset: pt states that she was in the hospital for 5 days before maribell, they told her she had lesions in her brain in the white matter. Had blood work drawn in the ER and they told her her inflammatory markers were elevated. Was seen in the ER last week for vomiting and diarrhea, not eating. Saw Gastro. On 1/10/18. 1.5 weeks ago she had an endoscopy and colonoscopy. She has been having elbow  And hands and knee pain, loosing use of her hands. Been dropping things. Also states that she has been getting lesions up her nose  Involved joints: see above  Pain scale:  8/10     Wakes the patient from sleep : Yes  Morning stiffness:Yes for 120 minutes  Meds used:colchisine, otezla, magic mouth wash, kenolog cream, lidocaine gel     Interim history  Since last visit:  1. Infections - Yes/ had an infection in her jaw. Having sinus issues  2. New symptoms/medical problem - Yes/ states that she is having problems  walking, states that she was bouncing when she was walking  3. Any side effects from Rheum medications -otezla, nausea  3. ER visits/Hospitalizations/surgeries - Yes/ see chart  4. Last PCP visit: November 2017 April 17, 2018  Have you ever seen a rheumatologist Yes Who You When 1/16/18  Joint pain history  Onset: pt states that she is not doing well. She is having increased stomach issues, only eats 2 times in 4 days unable to keep the otezla down due to vomiting . Has sleep study done in 1 week; no genital ulcers since last appointment. 6 small mouth sores since last appointment.   Involved joints: see above   Pain scale:  7/10     Wakes the patient from sleep : Yes  Morning stiffness:Yes for 60 minutes  Meds used:otezla , colchicine, diclofenac gel, magic mouthwash, lidocaine gel      Interim history  Since last visit:  1. Infections - Yes/ had a sinus infection, thinks she is getting sick now  2. New symptoms/medical problem - Yes/ neurology sent pt to cardiology. Has a heart condition but not sure what . She is seeing a ortho. Due to knee pain   3. Any side effects from Rheum medications -nausea/vomiting from the otezla   3. ER visits/Hospitalizations/surgeries - Yes/ seen in ER   4. Last PCP visit: yesterday     July 17, 2018  Have you ever seen a rheumatologist Yes Who You When 4/17/18  Joint pain history  Onset: pt Is here for a follow-up states that she started to get lesions on her legs , face and arm. Hurts all over, lower back is very painful. Right hand and wrist area are numb  Involved joints: see above  Pain scale:  7/10   worse in the morning  Wakes the patient from sleep : Yes/ does not go to sleep till late  Morning stiffness:Yes for 4-5 hours minutes  Meds used:colchicine, otezla has  Not started otezla yet due to extreme nausea      Interim history  Since last visit:  1. Infections - Yes/ had a bacterial infection in her pelvic area was hospitalized  2. New symptoms/medical problem - Yes/  hands are swelling up. Having orthopedic issues. Was having problem with her veins sticking up, and very painful. Has happened multiply times   3. Any side effects from Rheum medications -see above  3. ER visits/Hospitalizations/surgeries - Yes/ hospital and ER for bacterial infection  4. 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 ER said may have blood clot in calf, but when did MRI, stated body may have broken it up on it's own. Elevated D-dimer  Involved joints: Knees, neck, hands  Pain scale:  6.5/10     Wakes the patient from sleep : Yes  Morning stiffness:Yes for 180 minutes  Meds used:hyoscyamine, not taking otezla. Last dose Sep. Patient did not want to take otezla with the antibiotics.      Last major mouth ulcer- aug or Sep  No genital ulcers in the last 6 months.      Interim history  Since last visit:  1. Infections - Yes, UTI's twice a month since last visit  2. New symptoms/medical problem - No  3. Any side effects from Rheum medications -otzela causes nausea  3. ER visits/Hospitalizations/surgeries - Yes, 1/2/19 repaired some ligaments and mass taken out, also joint build up removed from a previous injury  4. Last PCP visit: 12/5/19 June 12, 2019  Have you ever seen a rheumatologist yes Who you When 1/9/19  Joint pain history  Onset: Patient is here for a follow up. A lot of infections and in and out of the hospital, who wanted her to follow up with Rheumatology again.  Involved joints: knees, legs, back, neck, shoulders, ankles  Pain scale:  6.5/10     Wakes the patient from sleep : Yes  Morning stiffness:Yes for 120 minutes  Meds used:magic mouthwash, colchicine     Interim history  Since last visit:  1. Infections - Yes, staph  2. New symptoms/medical problem - kidney failure  3. Any side effects from Rheum medications -none  3. ER  visits/Hospitalizations/surgeries - Yes, 3 hospitalizations, and surgeries,  4. Last PCP visit: 6/11/19 9/30/2020: New to me, establishing care. Flaring off otezla.     816   Reports worsening oral ulcers and joint pain and skin rash.     No vaginal ulcers currently. Last ones were 5 months ago.     Gets oral ulcers monthly, not today, had them last few days ago. They come up as flare up around period time. They self-resolve.     Thinks colcrys is helping but not enough, lost some benefit. No longer has diarrhea from it. the dose is 0.6 mg bid.     Came off otezla last year when she had severe staff infection, there was drug drug interaction with vancomycin. Wants to go back on it.     Skin lesions over chest are clearing up. Has skin lesions over her legs.     She is off of otezla >1 yr. She had daily vomiting and abdominal cramps initially which later resolved after 2 months.      Today, is her last day liz her health insurnaace  .     Reports pain and swelling liz hands. Drops things, lost strenghth. Veins look inflamed. Hands are swollen in AM and before bedtime. AM stiffness is 2 hours. can t tolerate ibuprofen.     Prednnisonne does not help much.     Wants to try eleve.     Had 2 blood clots over L arm, finished xraalto 4 months ago.      Was told to have severe dry eyes, hs not had eyes checked> 1 yr as was in the hospital with staff infection. systanee nd gentle eyedrops help some, sometimes gets sharp pain and and blurry vision in her eyes from dryness. No h/o uveitis.     has dry mouth, drinks water.     Gets T  F sometimes. It is sporadic.     Lost hair.     Gets intermittent CP. Had several hospital visits and admissions in the [facility] for it. lorazepam helped witch chest spasms, she does not like pain meds.     She was prescribed 0.5 mgq d prn, 10 tbs/monthss.     She gets dry cough, just started using albuterol inhaler, it helps.     No SOB.     Has gastroparesis, IBS  nd h/o severas  admission for constiption, colon blockage with impaction and gets bloating. has lost follow up with GI.     She is working with  to get medical assistance to get insurance back by early next year. She filed it again.     Gets botox inj every 3 months nd they help, has to cancel 10/20 botox inj s will not have insurance. they came back yesterday.     last seizure waas last year. still gets black out spells, salts ne was last week.    derm eye gi       FHx: Both parents have RA.  SHx: She was working in a [employer] shop, it was closed because of pandemic. sexually active, not on oral contraceptives. She thinks she had a misccraaaiaage last wk, had n period x2 months then mueller bleeding a lot, dark red and was different from period. Felt something came out that she feels she miscarried, no pos pregnancy test. No smoking. rarely drinks ETOH.  [facility]   thumbs between MCP tender   otezla helped with skin lesions, oral ulcers, joint pain.  colcrys  sjogre aleve biotene voltaren [phone]        8/11/2022: Samara presents for urgent visit to discuss m/o Behcet's flare during pregnancy, she is   She is pregnant 10 wk 3 days with ADRIAN 3/6/2023.  In 2020, had 1st trimester miscarriage.  Has LBP, ankle pain/swelling.   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 Discussed with MFM, back on low dose otezla since last week, only 1 tab a day.    Interval History  10/28/2022  Mrs. Oropeza was last seen 08/11/22. At that time she was advised to continue otezla (1 tablet BID) and remain off colcrys. Since that time, she has instead taken otezla 1 tablet daily. She notes more joint pain in hands, wrists, cervical spine, knees with 4 hours of monring stiffness. Previously <1hr with full strength.     Had a few genital ulcers recently which only lasted a few days. Oral ulcers have been more active of late and are currently healing.     Abdominal pain  on L side, pain is always worse after eating. Out of the phase of her pregnancy with morning sickness, at 22w on Monday.     She describes a few fevers at the end of September with hot flashes and cold sweats with Tmax 100. Called OB-GYN who instructed she should go in if persistent, though it resolved within a few days.     More easy bruising - thighs, she is currently on lovenox and baby aspirin. Previous history of clots especially with interventions. She recently had an issue with superficial thrombophlebitis following a blood draw.     Seeing more floaters in her vision. Sometimes tiny and clear, but can also be larger and 'gray', up to 1/4 of her vision. Has not seen Ophthalmology in many years.     More frequent migraines - did a nerve block recently but this has worn off, previous botox injections.     ADRIAN 03/06/23.    1/25/23:    Baby is growing small. Had high BP yesterday, they would monitor it. DBP was 90.    Increasing otezla to bid helped. It helped with ulcers. Has skin breakdown since Christmas, never had it like this over her chest, but still it feels related to Behcet.    Hands and ankles are painful and swoleln, ankles are new but had hand pain with behcet before,. This started fater entering [address] [place] [address]. Her ADRIAN might be earlier based on baby's growth, induction at 37 wk, progress at 39. nex twk will have another check.    Still has the neck pain. Still gets eye floaters.    6/21/2023:    She delivered 1 month early due to pre-eclampsia. Had Mg infusion, had bleeding issues, spent a week. Was on BP med till a month ago, now stable. Was induced, had NVD. No blood transfusions needed.    Her baby girl Lashanda was born 2/12/2023, small, slow growth but healthy with no fetal deformities. Had vaginal sores after giving birth. Still gets mouth ulcers, one oral ulcer now, no vaginal ulcers now. Plans to breast feed x 7 months. Does breastfeeding. Saw OB/[facility]N in 4/2023 [facility]. They are aware she  is on otezla.    She was on ASA 81 mg every day during pregnancy.    9/27/23: Doing well, less behcet flares since giving birth. Most recent flare on her chest, now healing. No new oral or vaginal ulcers.     She has been having back pain that shoots down her right leg since June and has been utilizing PT without improvement.     She also has had swelling and pain in both hands and right wrist, as well as both ankles L>R.     She notes missing some doses of otezla due to running out of refills and avoiding taking it without food. She has been eating less because she has been more tired since having her daughter.       2/1/2024:    Has back pain. Had epidural inj one wk ago, has to wait x 2 weeks, if no help to get surgery for bulging disc    Some oral lesions 2wk ago flare with swollen hands, but currently has no oral ulcers.     Hands hurt today with pain level about 4/10. Celebrex did not help much.    No vaginal ulcers    Reports loss of appetite past 5 months    Her baby girl was sick x past 2 weeks, just got better last night, she had RSV. Samara did not get much sleep, did not eat much over past 2 weeks because of this.      6/5/2024:    -active inflammatory arthritis despite adding HCQ last visit. HCQ causes GI upset    -fingers are deforming, new    -not feeling well, has neck pain, nausea x past few days    -still breastfeeding, no pregnancy plans    -missed otezla x 2 days, got some skin lesions back over chest area; otherwise otezla is managing her behcet's sores well    Review of Systems    A comprehensive ROS was done. Positives are per HPI.    Past Medical History   Past Medical History:   Diagnosis Date    Anemia     Anxiety     Arthritis     Behcet's disease (H)     Cervical adenitis May 2010    Chronic abdominal pain     Constipation, chronic 1994    Fibromyalgia     Gastro-oesophageal reflux disease     Gastroparesis     Irregular heart beat     tachycardia, has had workup    Migraines      Neuromuscular disorder (H)     fibramyalgia    Palpitations     PONV (postoperative nausea and vomiting)     Seizure (H)     Seizures (H)     unknown etiology    Syncope     Tourette's       Past Surgical History:   Procedure Laterality Date    ARTHROSCOPY ANKLE, OPEN REPAIR LIGAMENT, COMBINED Left 9/25/2019    Procedure: LEFT ANKLE ARTHROSCOPY WITH LIGAMENT REPAIR;  Surgeon: Andres Johnson DPM;  Location: [facility]    ARTHROSCOPY ANKLE, REPAIR LIGAMENT Left 1/2/2019    Procedure: Ankle arthroscopy and sinus tarsi evacuation, ligament repair, left lower extremity;  Surgeon: Andres Johnson DPM;  Location: [facility] OR    ARTHROSCOPY KNEE WITH PATELLAR REALIGNMENT  7/25/2013    Procedure: ARTHROSCOPY KNEE WITH PATELLAR REALIGNMENT;  Left Knee Arthroscopy, Medial Patellofemoral Ligament Reconstruction with Allograft  ;  Surgeon: Jennifer Acevedo MD;  Location: [facility]    COLONOSCOPY  2015    DENTAL SURGERY  1996    Teeth removal    ENDOSCOPY UPPER, COLONOSCOPY, COMBINED  2005    HC ESOPH/GAS REFLUX TEST W NASAL IMPED >1 HR N/A 2/15/2017    Procedure: ESOPHAGEAL IMPEDENCE FUNCTION TEST WITH 24 HOUR PH GREATER THAN 1 HOUR;  Surgeon: Timothy Matta MD;  Location: [facility]    IR LUMBAR PUNCTURE  1/22/2024    IR PICC PLACEMENT > 5 YRS OF AGE  3/13/2019    IR UPPER EXTREMITY VENOGRAM LEFT  2/25/2020    IRRIGATION AND DEBRIDEMENT FOOT, COMBINED Left 3/12/2019    Procedure: COMBINED IRRIGATION AND DEBRIDEMENT LEFT ANKLE;  Surgeon: Micha Glover MD;  Location: [facility]    IRRIGATION AND DEBRIDEMENT LOWER EXTREMITY, COMBINED Left 5/7/2019    Procedure: 1.  Excision of wound down to and including deep fascia, less than 20 cm2.  2.  Irrigation and debridement, left ankle.;  Surgeon: Andres Johnson DPM;  Location: [facility] OR    PICC INSERTION Left 05/05/2019    4Fr - 45cm (5cm external), Basilic vein, SVC RA junction      Patient Active Problem List    Diagnosis Date Noted    Preeclampsia 02/10/2023      Priority: Medium    Infection of joint of ankle (H) 05/06/2019     Priority: Medium    Cellulitis 03/09/2019     Priority: Medium    Displacement of lumbar intervertebral disc without myelopathy 11/13/2018     Priority: Medium     Overview:   Created by Conversion      Iron deficiency associated with nonfamilial restless legs syndrome 11/13/2018     Priority: Medium    Enthesopathy of hip region 11/13/2018     Priority: Medium     Overview:   Created by Conversion      Tourette's syndrome 11/13/2018     Priority: Medium     Overview:   Created by Conversion      Somatic symptom disorder 06/01/2018     Priority: Medium    Pelvic floor weakness 04/25/2018     Priority: Medium    Spells of decreased attentiveness 12/19/2017     Priority: Medium    Mobile cecum (H) 11/09/2017     Priority: Medium     Cecum noted in Right lower quadrant on 4/17 CT scan, and in Left upper Quadrant on CT on 11/2017.      Vitamin D deficiency 10/11/2017     Priority: Medium     How low, unknown/not found. On D when tested at 27. Starting cholecalciferol October 2017. Needs recheck.      Convulsions, unspecified convulsion type (H) 10/03/2017     Priority: Medium    Transient alteration of awareness 10/03/2017     Priority: Medium    Chronic pain syndrome 07/27/2017     Priority: Medium    Major depressive disorder, recurrent episode, moderate (H) 06/27/2017     Priority: Medium    Cervical pain 05/02/2017     Priority: Medium    Acute left ankle pain 03/31/2017     Priority: Medium    Cervical dystonia 03/28/2017     Priority: Medium    PTSD (post-traumatic stress disorder) 01/17/2017     Priority: Medium    Patellofemoral instability 10/20/2016     Priority: Medium    Fibromyalgia 08/04/2016     Priority: Medium    Rheumatoid arthritis of multiple sites without rheumatoid factor (H) 08/04/2016     Priority: Medium    Raynaud's disease without gangrene 08/04/2016     Priority: Medium    Chronic abdominal pain 08/04/2016     Priority: Medium     Palpitations 01/12/2016     Priority: Medium    On colchicine therapy 10/30/2015     Priority: Medium    Spell of shaking 05/06/2015     Priority: Medium    Migraine 02/04/2015     Priority: Medium    Behcet's disease (H) 12/10/2014     Priority: Medium    Headaches due to old head injury 11/12/2013     Priority: Medium    Milk protein intolerance 10/11/2013     Priority: Medium    Intestinal malabsorption 10/11/2013     Priority: Medium    Concussion 02/13/2013     Priority: Medium     Jan 2013, with prolonged recovery- followed by sports med        Knee pain 01/03/2013     Priority: Medium    Generalized anxiety disorder 06/25/2009     Priority: Medium    Tics - Tourette syndrome 05/18/2009     Priority: Medium     Followed by psychotherapy. Symptoms well managed. Originally diagnosed at [facility]. (Dr. Simpson)          IBS (irritable bowel syndrome) 05/18/2009     Priority: Medium    Allergic rhinitis 05/18/2009     Priority: Medium    GERD (gastroesophageal reflux disease) 01/10/2008     Priority: Medium    Gastroparesis 1994     Priority: Medium      Family History   Problem Relation Age of Onset    Depression Mother     Neurologic Disorder Mother         Migraines, take imitrex injection.  Also in maternal grandmother.      Alcohol/Drug Father     Hypertension Father     Depression Father     Osteoarthritis Father     Cardiovascular Maternal Grandmother     Depression Maternal Grandmother     Hypertension Maternal Grandmother     Alzheimer Disease Maternal Grandmother     Cardiovascular Maternal Grandfather     Hypertension Maternal Grandfather     Depression Maternal Grandfather     Alcohol/Drug Maternal Grandfather     Diabetes Maternal Grandfather     Cardiovascular Paternal Grandmother     Hypertension Paternal Grandmother     Cardiovascular Paternal Grandfather     Hypertension Paternal Grandfather     Glaucoma No family hx of     Macular Degeneration No family hx of       Allergies    Allergen Reactions    Amoxil [Penicillins] Rash     Dad unsure of reaction.    Bee Venom Anaphylaxis    Bioflavonoids Anaphylaxis    Citrus Anaphylaxis     All Morrill    Contrast Dye Rash     Contrast Media Ready-Box [facility], 04/09/2014.; Contrast Media Ready-Box [facility], 04/09/2014.  NOTE: this is a contrast media oral with iodine. Premedicate with methylpred standard for IV contrast, request barium contrast for oral contrast.    Iodinated Contrast Media Hives and Rash     Contrast Media Ready-Box [facility], 04/09/2014.; Contrast Media Ready-Box [facility], 04/09/2014.  NOTE: this is a contrast media oral with iodine. Premedicate with methylpred standard for IV contrast, request barium contrast for oral contrast.    Pineapple Anaphylaxis, Difficulty breathing and Rash    Reglan [Metoclopramide] Other (See Comments)     IV dose only, in ER, rapid heart rate.    Ace Inhibitors      Difficulty in breathing and GI upset    Amitiza [Lubiprostone] Nausea and Vomiting    Amoxicillin-Pot Clavulanate     Midazolam Unknown     parent states that when pt takes this medication, she wakes up being very violent .    Midazolam Hcl      Coming out of pelvic exam at age of 5, was kicking and screaming when coming out of the versed.    Other [No Clinical Screening - See Comments]      Bleech/ chest tightness, itchy throat, swollen tongue, hives    Tizanidine Other (See Comments)     Confusion, back pain, photophobia, abdominal pain, shaking, anxious      Adhesive Tape Rash    Azithromycin Hives and Rash    Cephalexin Itching and Rash    Sulfa Antibiotics Rash     Skin scarring      Current Outpatient Medications   Medication Sig Dispense Refill    albuterol (PROAIR HFA/PROVENTIL HFA/VENTOLIN HFA) 108 (90 Base) MCG/ACT inhaler Inhale 2 puffs into the lungs every 6 hours as needed for shortness of breath / dyspnea or wheezing 1 Inhaler 1    amitriptyline (ELAVIL) 25 MG tablet TAKE ONE TABLET BY MOUTH AT BEDTIME 90 tablet 0    apremilast (OTEZLA) 30  MG tablet Take 1 tablet (30 mg) by mouth 2 times daily Hold for signs of infection, and seek medical attention. 180 tablet 3    artificial tears OINT ophthalmic ointment 0.5 inch strip each eye at night 1 Tube 11    azelaic acid (FINACIA) 15 % external gel Once daily to scars, upper chest and small portion of back and spot on belly button. Hold if irritating 50 g 5    benzocaine (AMERICAINE) 20 % external aerosol Apply to perineum four times daily as needed for pain 57 g 3    benzocaine (TOPICALE XTRA) 20 % GEL Apply 1 g to affected area 4 times daily as needed for mouth sores. 30 g 5    benzocaine (TOPICALE XTRA) 20 % GEL Apply as needed locally to mouth or nasal ulcers for pain; 4 times daily as needed 30 g 1    betamethasone valerate (VALISONE) 0.1 % cream Apply topically 2 times daily as needed       bisacodyl (DULCOLAX) 5 MG EC tablet Take 2 tablets (10 mg) by mouth daily as needed for constipation 30 tablet 0    citalopram (CELEXA) 20 MG tablet Take 1 tablet (20 mg) by mouth daily 90 tablet 1    EPINEPHrine (EPIPEN 2-EAMON) 0.3 MG/0.3ML injection Inject 0.3 mLs (0.3 mg) into the muscle as needed for anaphylaxis 0.6 mL 3    etonogestrel (NEXPLANON) 68 MG IMPL Inject 68 mg Subcutaneous      fluocinonide (LIDEX) 0.05 % external gel Apply topically 2 times daily. 60 g 11    folic acid (FOLVITE) 1 MG tablet Take 1 tablet (1 mg) by mouth daily 90 tablet 3    gabapentin (NEURONTIN) 300 MG capsule Take 1 capsule (300 mg) by mouth every morning 60 capsule 1    hydrocortisone, Perianal, (ANUSOL-HC) 2.5 % cream Place rectally 3 times daily as needed for hemorrhoids 30 g 0    hydroquinone (ARACELY) 4 % external cream Apply once daily for 3 months. Stop for one month. If further lightening is desired, proceed with one additional month of treatment. 28 g 1    hydrOXYzine HCl (ATARAX) 25 MG tablet TAKE ONE OR TWO TABLETS BY MOUTH EVERY SIX HOURS AS NEEDED      hypromellose (GENTEAL) 0.3 % SOLN 1 drop every hour as needed for  "dry eyes       lactulose 20 GM/30ML SOLN Take 30 mLs by mouth 3 times daily as needed (for constipation) 300 mL 3    lanolin ointment Apply topically every hour as needed for other (sore nipples) 7 g 3    lidocaine (LMX4) 4 % external cream Apply topically once as needed for mild pain 120 g 1    LINZESS 290 MCG capsule TAKE 1 CAPSULE BY MOUTH EVERY DAY IN THE MORNING BEFORE BREAKFAST 90 capsule 0    methotrexate sodium, PF, 50 MG/2ML SOLN injection Inject 0.4 mLs (10 mg) Subcutaneous every 7 days 10 mL 1    mometasone (ELOCON) 0.1 % external ointment Apply topically 2 times daily. 45 g 11    ondansetron (ZOFRAN ODT) 8 MG ODT tab Take 1 tablet (8 mg) by mouth every 8 hours as needed 90 tablet 3    polyethylene glycol (MIRALAX/GLYCOLAX) powder Take 1 capful by mouth 3 times daily      Prenatal MV-Min-Fe Fum-FA-DHA (PRENATAL 1 PO)       rizatriptan (MAXALT-MLT) 5 MG ODT DISSOLVE 1 OR 2 TABLETS IN THE MOUTH AS NEEDED FOR HEADACHE AT ONSET OF MIGRAINE, MAY REPEAT IN 2 HOURS AS NEEDED. MAX 30MG IN 24 HOURS AND MAX 5 DAYS PER MONTH      Sharps Container MISC Use for methotreaxte shots 1 each 11    sodium fluoride 1.1 % CREA Apply 1 Application topically daily      sucralfate (CARAFATE) 1 GM/10ML suspension Take 10 mLs (1 g) by mouth 4 times daily 1200 mL 2    syringe/needle, sisp, 25G X 5/8\" 1 ML MISC Inject 1 Syringe Subcutaneous every 7 days Use with Methotrexate 100 each 3    triamcinolone (KENALOG) 0.1 % external ointment Apply twice daily as needed to lesions on the genitals and body. OK to use very sparingly on the face. 454 g 2           Physical Exam   /82   Pulse 102   Temp 97.8  F (36.6  C) (Oral)   Ht 1.6 m (5' 3\")   Wt 65.3 kg (144 lb)   SpO2 98%   BMI 25.51 kg/m    GA: NAD, pleasant  HEENT: nl sclera/conj, small area of erythema over L buccal mucosa  Neck: no cervical LAP  Chest: CTAB  CV: no M/R/G, RRR  Abdomen: soft, NT  MSK: Jaccoud arthropathy affecting 4th and 5th fingers, no synovitis or " tenderness  Skin: no rash  Neuro: non focal  Psych: nl affect             Data   Data      10/28/2022   BEAUCHAMP-28 (ESR) --   BEAUCHAMP-28 (CRP) --   Tender (BEAUCHAMP-28) 2 / 28    Swollen (BEAUCHAMP-28) 0 / 28    Provider Global --   Patient Global --   ESR 17 mm/hr   CRP --     No results found for any visits on 01/15/25.  CBC RESULTS: Recent Labs   Lab Test 09/07/20  1147   WBC 4.1   RBC 5.09   HGB 13.0   HCT 43.0   MCV 85   MCH 25.5*   MCHC 30.2*   RDW 15.0        TSH   Date Value Ref Range Status   09/07/2020 0.77 0.40 - 4.00 mU/L Final   03/21/2019 0.53 0.40 - 4.00 mU/L Final   10/30/2018 1.06 0.40 - 4.00 mU/L Final   11/26/2016 0.87 0.40 - 4.00 mU/L Final     T4 Free   Date Value Ref Range Status   01/31/2007 1.26 0.70 - 1.85 ng/dL Final     Rheumatoid Factor   Date Value Ref Range Status   02/06/2024 <10 <14 IU/mL Final   09/30/2020 <7 <12 IU/mL Final   05/06/2016 <20 <20 IU/mL Final       Reviewed Rheumatology lab flowsheet

## 2025-01-15 NOTE — LETTER
1/15/2025       RE: Samara Oropeza  [address]  [place] [facility] [place] [id number]     Dear Colleague,    Thank you for referring your patient, Samara Oropeza, to the [facility] [place] [facility]. Please see a copy of my visit note below.      Office Visit Details    Rheumatology Clinic Return Visit Patient     Samara Oropeza MRN# [id number]   YOB: 1995 Age: 29 year old     Date of Visit: 01/15/2025   Last seen: 2024 (virtually)       Assessment & Plan    Assessment & Plan  Samara is a 29 year old  female (ADRIAN 21) with Behçet's disease (pathergy, oral/genital ulcers, polyarthralgia) who presents today for RECHECK (Follow Up)      # Behçet's disease (pathergy, arthralgias, recurrent oral / genital ulcers)  # s/p delivery on 2023 with high-risk pregnancy, complicated by pre-eclampsia,  birth but healthy baby    10/2022:  Mrs. Oropeza arrives for follow-up of her Behçet's disease that is more active at present with recent decrease of her Otezla (60mg --> 30mg) recommended by [facility]. Previous discussion with her OB providers had been to decrease to the lowest effective dose, clearly 30mg is not effective as she describes worsened oral ulcers, genital ulcers, arthralgias, abdominal pain, and thrombophlebitis. Although there is not a great deal of evidence for Otezla safety in pregnancy, its mechanism of action would suggest it. Counterbalancing these concerns are what we understand of Behçet's in pregnancy which more often improves but in some cases (~29%) becomes worse, and can flare more often in the 1st trimester and post- period.  She also describes some vision changes, which in the context of Behçet's is conerning. She has not seen Ophthalmology in many years; I will place a referral today.     2023: Stable today, on otezla 30 mg bid, breastfeeding,  MFM is ok with breastfeeding, discussed ACR reproductive guideline, it does not recommend otezla during pregnancy and breastfeeding given lack of data; however Samara remained on it during pregnancy and now breastfeeding as stopping it leads to severe flare of her behcet and benefits of staying on it outweighs risks. Her MFM provider is aware of staying on otezla and is ok with it during breastfeeding.    9/27/23: Doing well, remains on otezla. Has had some interruptions in doses. Today has back and hand pain, and ankle swelling. Will start celebrex, ok with breastfeeding.     2/1/2024:    Behcet's oral/vaginal ulcers are under fair control on otezla, will continue. But she continues to have active inflammatory arthritis, she failed celebrex. Will add  mg bid; risks were discussed. HCQ is safe in pregnancy and breastfeeding in case of pregnancy in future . Will check labs.      6/5/2024:    Active inflammatory arthritis despite adding HCQ in 2/2024, HCQ causes SE, fingers are deforming. Recommend to add methotrexate; risks were discussed. Was informed that it is not allowed in pregnancy and breastfeeding. She will start after she is done with breastfeeding in 8/2024. She prefers sub q inj over oral to avoid GI SEs. Will continue otezla to control oral/genital ulcers. I reviewed labs, stable.    9/18/2024:    Continues to have active inflammatory arthritis, plan is to start methotrexate as soon as she is done with breastfeeding; risks were discussed.    Stopped HCQ in 6/2024 given lack of benefit.    Oral/genital ulcers due to Behcet's are under control on otezla.    New itching, skin rashes are ongoing. I will message her derm team as prescribe topical is not covered by insurance.    Plan:    When you stop breastfeeding, start methotrexate 0.4 ml weekly under skin injection, our pharmacist Natalie (MTM referral) will call you to teach you how to do it    Start folic acid 1 mg a day to help with  "methotrexate side effects    Stay on otezla    Labs one month after start of methotrexate    Avoid pregnancy on methotrexate    Return in about 3-4 months in person    1/15/24:    Conversations today reveal alfredo is currently in the midst of a flare, with arthralgia of the hands, neck and ankles. She reports having the flares about once a month. This episode has been ongoing for \"3-4 days\". She also shared a recent diagnosis of cataracts, as well as the onset of blurry vision and floaters of both eyes which began with this flare. Samara is still weaning her child off of breast feeding, and has not been able to trial methotrexate. We discussed starting Imuran once daily and she was open to the suggestion. There was some concern about bruising following Imuran previously, however, she does not recall such a reaction.     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    In light of her eye findings, we also discussed scheduling an urgent ophthalmology visit to rule out uveitis.     Plan:  - Start Imuran 50 mg once daily; risks were discussed. Per ACR reproductive guidelines, it is safe in pregnancy and breastfeeding  - Continue Otezla  - Labs today including HepB/C serolgy as well as Quantiferon TB testing in case of using remicade in future, as well as follow labs in 4 weeks to monitor effects of Imuran  - Ophthalmology referral made        Plan:    Add imuran 1 tab a day to otezla    Labs today and in 4 weeks    Urgent eye clinic appointment    Return video visit in about 3-4 months    I saw and examined the patient with Dr. Sosa. I acted as scribe for Dr. Sosa.      Stanislav Romero, MS4    Attending Note: I saw and examined the patient with medical student Michelle. This note was written by Michelle, who acted as scribe for me. I agree with findings and recommendations " written in this note. The note reflects decisions made by me.       TT 40 min was spent on date of the encounter doing chart review, history and exam, documentation and further activities as noted above. Any prior notes, outside records, laboratory results, and imaging studies were reviewed if relevant.      Disclosure: I earn consulting income from [employer], the company that manufactures Otezla. I am not involved directly in promoting otezla or doing research with otezla.    Orders Placed This Encounter   Procedures     Erythrocyte sedimentation rate auto     CRP inflammation     ALT     Albumin level     AST     Creatinine     Hepatitis C antibody     Hepatitis B surface antigen     Hepatitis B core antibody     Adult Eye  Referral     CBC with Platelets & Differential     Quantiferon TB Gold Plus      Dominga Sosa MD                 Medical History   History of Present Illness  Samara Oropeza is a 29 year old female who presents for follow-up of her Behçet's.    1/15/2025   - Currently experiencing a flare with arthralgia in her hands, neck, and ankles, ongoing for 3-4 days    - She also reports new vision and floaters both eyes that began this flare, and an Ophthalmology referral was placed as a result    - Currently weaning from breastfeeding and has not yet trialed methotrexate    - 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    9/18/2024:    -reports being in a flare    -hands are pretty painful, bent 5th fingers    -knuckles swell up in AM and at night, reports 3 hours of AM stiffness    -almost done with breastfeeding, has not started the methotrexate yet    -reports severe itching over legs from knees down to legs, no rash, skin looks darker where she is itching, pretty swollen as well    -she never this kind of itching before, reports this started after back surgery    -had back surgery 4/30/2024, decompression of a nerve, it helped with nerve pain, still  feels numb/tingly down in her legs    -gets sports over chest, breast, saw derm, was prescribed topical which is not covered by insurance. Next step would be laser tx    Seen Dr. Marilyn Moran Rheumatology in [facility] 10/14:    Original presentation 2014:     Ms Oropeza is a 19 y.o. female who presents with one year of presentation of painful oral ulcers (monthly) once that have worsened with two episodes in the last two months that presented with painful vulvar or vaginal ulcers (2 episodes so far every month) [not sure if they leave scar] as well that timing coincides with menses ([facility]: 8/25/14).   ORAL ULCERS: last two episodes were severe, painful, white base with erythematous halo, that appear and disappear in the matter of 1-2 weeks without, does not bleed and doesn't respond to any treatment used (magic mouthwash), 10-15 in number with the biggest one being dime size.      VAGINAL ULCERS: 2-3 in number between labia majora and minora that occur simultaneously with oral ulcers, painful, non bleeding ulcers that are erythematous, no pus.      FOLLICULITIS: patient reports having spots in legs specially around ankle and knee, but arms, and neck are affected as well. Small lesions that resemble ingrown hair, most are red erythematous elevated lesions, well demarcated, some with liquid that patient refers as pimple like.      SKIN RASHES: welts and red macules with well defined borders that are pruritic and non-ulcerative on chest, arms and back. Benadryl relieves.      ARTHRALGIAS: In descending order of severity: hips, knees, wrists, shoulders, neck, lumbar, fingers.   C/o morning stiffness in hips, back and neck lasting for about until noon.      Ms. Oropeza reports they present in severe flares and never fully resolve, but do get better. She has seen some swelling and warmth on the affected joints but has not noticed and redness. Has tried Tylenol, ibuprofen, and hydrocodone with not much benefit.      FEVERS: Reports  3-4 sporadic episodes per month of self limited fevers of 38 C that occur during the evenings and associate facial flushing.      HEADACHES: occur daily and are bitemporal and irradiate occipitally, pulsatile in nature, intensity varies from intense to mild, are worsened with movement. On treatment with ibuprofen and somatriptan without any positive results.      VISION CHANGES: Patient reports that suddenly she would only see a gray blotch in her right eyes and would persist like this for a day and a half. Also reports blurriness in her left eye. Presence of pain and burning sensation of eyeball with associated redness. Has changed prescription glasses in the matter of 2 years 3 times. She has had opthalmology exam and was not suggestive of any evidence of uveitis.   She was also evaluated in ED for a dizzy spell.      ABD PAIN W/ INTERMITTENT DIARRHEA AND CONSTIPATION: Patient reports abdominal pain that is intermittent, periods of 7 days without bowel movement and feeling bloated with change of pattern to diarrhea (liquidy without blood nor mucus, non foul smelling). Has taken Bentyl, Zegrid, and rinetidine with mild clinical improvement.  She also reports small red nodules after each needle stick for blood draw.   Neurology saw her back then and was not impressed with her presentation as physical examination was normal. Also MRI brain normal: Findings: No definite hemorrhage, mass affect, midline shift, or ventriculomegaly is noted.There are a few scattered non specific white matter T2 hyperintensities. Axial diffusion weighted images are unremarkable.     The major vascular structures appear patent. There is opacification and severe mucosal thickening in the right maxillary sinus. The remaining paranasal sinuses, and mastoid air cells are clear. Orbits are unremarkable  She has + HSV-1 IGG. Seen ID. Negative for Herpes simplex virus culture. HSV IGM I/II COMBINATION SENT TO [facility]  RESULTS = <0.91  REF RANGE:  <0.91 = NEGATIVE  ID did not think HSV could explain her mouth and genital sores.      CRP within normal limits. HIV negative. CBC within normal limits; UA negative. Creatinine within normal limits   CYNDIE neg.  Antigliadin neg.   ANti TTG neg.   [facility] 2014: Colonoscopy and endoscopy neg; result not available. Not sure if biopsies were done.   Raynaud's phenomenon:  Onset: about 2013  Digits: all fingers  Digital ulcers: no  Color changes: white ---> purple and red  Duration of an attack: About couple of hours per mom  Pain during attack: not clear pain but bruise like feeling  Frequency of attacks: few times a month  Family history of Raynauds: no  GERD: very long time     No hemoptysis.   No miscarriages/ no thrombosis in past.   No family or personal history of psoriasis, ulcerative colitis or chron's disease.   No buttock pain or low back pain or stiffness in AM     Interim history:  Dec 2014- Multiple mouth ulcers and did not eat for 5 days. This coincided with periods. Colchicine 0.6mg PO BID started in Nov 2014.   Prednisone taper in Dec 20mg to 0 in 4 weeks. She also used magic mouthwash. Kenalog cream. After going to the ER, 3 days later her ulcers were better. She thinks it improved after the menstruation stopped.   LMP 3 weeks ago.   COLCHICINE HAS HELPED A LOT REDUCING THE INTENSITY OF MENSTRUATION ASSOCIATED ORAL AND VULVAR ULCERS.      Interim history: 6/15     Since last visit only two episodes of mouth sores- small sized 6   No genital ulcers since last visit.   Colchicine 1.2 mg in AM and 0.6mg in PM keeps her symptoms under control.      Admitted in 5/15:  Admission Diagnoses:  - LUE weakness  - spell  - hx of migraines  - hx of Tourette syndrome  - hx of Behcet's disease     Discharge Diagnoses:   - spell likely 2/2 anxiety  - hx of migraines  - hx of Tourette syndrome  - hx of Behcet's disease     CT and MRI brain was normal.      Seeing Dr. Lilliana Miramontes soon as outpatient.      Dr. Garcia  "did not find any intraocular inflammation.       Interm 10/30/2015  ED for migraine. Continues to see neuro - MRI brain without lesions noted. Saw GI - upper barium normal. May be considering repeat colo. Worsening lesions in mouth and genitals. Has had continuous lesion in mouth x 2 months. 2 genital ulcers. Had fracture of right sesamoid in foot. Continues to have low grade fevers. New folliculitis on chest, legs and arms.      Interim hx: 1/11/2016    Pt states that since last visit she continues to have oral ulcers and has noted more folliculitis-like lesions on her lower extremities.  She states that on her right lower leg she had a red macular spot that has since resolved.  She denies uveitis.  She states that the colchicine initially helped but then stopped working.  She takes 0.6 mg TID.  She stopped taking her prednisone last week as she feels like this hasn't helped.  She hasn't had any episodes of vaginal ulcers.      She saw GI who stated she has gastroparesis.  She is seeing Cardiology later this week for possible syncopal episodes.       Interim history 5/16  Mouth ulcers X 1 last month  Genital ulcers - none since last visit.      Bilateral MCPs pain, knee pain and low back pain +ve increased since last visit  Morning stiffness X 2 hours (increasing)   5-6/10     \"overall myalgia\" has gotten worse    C/o pseudofolliculitis lesion on anterior shins bilaterally worse     Interim history: (7/18/2016)  Patient has just started Humira for 2 doses on 7/1 and 7/15 and reported minimal improvement of her hip pain but otherwise, did not notice anything different.      She reported painful mouth ulcer once a month since last visit but noticed that it has been getting deeper.   Denied any genital ulcers.     Still has ongoing bilateral MCPs pain, knee pain but this has been intermittent and she did not have any much pain today. She reported 2-3 hours morning stiffness of both hands and pain score of 6/10 at her " knees and 8/10 of her hands but currently takes no pain medication except prn ativan which she takes when her muscle is really tight.      The only concern is that she gained weight even though she has not been eating much (eat once a day) and do exercises every day for 1 hour (with video of yoga, pilates). Her mother wonders if she needs to have some labs work up include sex hormone, thyroid, cortisol.     Interval history 9/14/16  Patient was started Humira at the last visit, patient reports worsening of skin ulcers since starting Humira and stopped taking Humura for the past 2 weeks. Patient reports oral and genital ulcers has been stable even before starting Humira and has not had any interval oral/genital ulcers. However she reports recurrent skin ulcers occurring on a daily basis that affects her chest and anterior legs. Patient also has stopped taking prednisone, she reports that she doesn't feel the prednisone helps, her last dose of prednisone was 6+ months ago. Patient also report worsening low back pain that sometimes causes her to limp.     In the interval patient also visited ED on 8/31/16 for left hand pain and what the patient describes as phlebitis, no medication or procedure was done and the patient was discharged with a splint. Patient also reported 1 episode of chest pressure that was associated with dyspnea and numbness of the left arm, she was shopping in a supermarket at the time of onset, and the pressure resolved after she took a nap.     Patient denies any infectious symptoms in the interval, no fever, chills, night sweat, cough, dysuria, denies eye pain or visual changes.     November 4, 2016  Oct - had one genital ulcer  Oral ulcers X 5- around menstruation time.   Knee pain- chronic on the left side  Dizziness spells - on and off; been to the ER for that in the past.   On and off migraines  July 2013 - s/p MPFL reconstruction plus LRL 7/2013  Morning stiffness in knee (left) X 1 hour      Severe jaw pain and chest pain- patient wondering if this is Tourette's or esophageal spasms. Cardiac workup negative.   Fever      January 13, 2017  Yesterday in [place] [facility] [place] orthostatic syncope from dehydration.   Chest pain on and off still continues. Painful with deep breaths.   Oral ulcers - few minor ones lasting for one week;   One major one in roof of mouth lasting for about one week.   Knee pain +ve - reviewed the recent MRI with her orthopedic surgeon.   On and off migraines  otezla is approved. Still waiting to receive the meds.      March 31, 2017  Otezla current dose 15mg PO BID.   She is tolerating okay at this dose and is willing to increase the dose further up to 30mg BID.   Her joint pains are better on otezla. Knee pain is also better.   Back and neck pain +ve 8/10 when it is worse. Intramuscular botox injections were given on Monday in PMR.   2 minor genital ulcers in the interim- resolved.   Few oral ulcers in the interim- resolved.   Nasal ulcer - resolved.   Migraines on and off.   Nausea with otezla but improving.   Dizziness and blackouts on and off. She fell once and hurt her ankle. Wearing boot in left leg.   Complete ROS negative except for above     May 17, 2017  Tolerating otezla better. Not throwing up anymore. Current dose 20mg in AM and 30mg in PM (2nd week).   Patient thinks that her oral ulcers frequency and severity are improving with otezla. Also no genital ulcers in the interim.   Currently on doxycycline for bronchitis/?pneunomia. 4 more days left.      Joint pain history  2 weeks ago/ dx with pneumonia 1 week ago/  Involved joints: all over  Pain scale: 6.5/10   Wakes the patient from sleep : Yes  Morning stiffness: Yes for 120 minutes  Meds used:otezla,colchicine     Interim history  Since last visit:  1. Infections - Yes/ pneumonia  2. New symptoms/medical problem - Yes/ thinks she may have had a mini-seizure about 10 days ago ; did not seek medical attention; did not  reoccur after that. Patient seeing PCP today.  3. Any side effects from Rheum medications -vomiting a lot (resolved)  3. ER visits/Hospitalizations/surgeries - Yes  4. Last PCP visit: has an apt. today     Patient still getting double vision. Floaters present.      Therapist recommended psychiatry evaluation. Patient does not want to see psychaitry. Patient will see PCP today.      August 22, 2017  Have you ever seen a rheumatologist Yes Who You When 5/17/17     NO genital ulcers in the interim.   Mouth ulcers- three in the back of the throat and roof of the mouth last month.      Joint pain history  Onset: doing alittle worse / cut her otezla back to 30mg  Daily due to GI problems  Involved joints: all over her body. Been having more black out episodes, been having more chest pains.also has raised bumps on both legs from the knees down that itch and are painful   Pain scale:  5.5/10     Wakes the patient from sleep : Yes  Morning stiffness:Yes for 120 minutes  Meds used:otezla, colchicine (three pills daily)     Interim history  Since last visit:  1. Infections - Yes stomach issue  2. New symptoms/medical problem - No  3. Any side effects from Rheum medications -nausea from otezla  3. ER visits/Hospitalizations/surgeries - Yes, ER for foot, ankle injury from passing out and fell down the steps. Hit her head another time from passing out. Alcohol poisoning in July  4. Last PCP visit: 6/23/17 September 19, 2017  Have you ever seen a rheumatologist Yes Who You When 8/22/17  Joint pain history  Onset: pt states that she hurts everywhere for 6 days  Involved joints: all joints  Pain scale:  7/10     Wakes the patient from sleep : Yes/ not sleeping at all  Morning stiffness:Yes for 180 minutes  Meds used:colchicine, otezla, magic mouth wash, lidocaine gel  Last one week: increased joint pain; and genital ulcer  Genital lesion (dimed size) in the last one week  After botox a week ago, she had fever 101 for three days;  near syncopes. Back pain; floaters  Chest pain , mouth sores, hand pain, morning pain were all better with otezla 30mg twice daily.     Interim history  Since last visit:  1. Infections - No  2. New symptoms/medical problem - No  3. Any side effects from Rheum medications -nausea from the otezla  3. ER visits/Hospitalizations/surgeries - No  4. Last PCP visit: may 2017      October 18, 2017     Have you ever seen a rheumatologist Yes Who You When 9/19/17  Joint pain history  NO mouth or genital sores in the last 4 weeks.   Medrol dosepak helped with visual symptoms but not joint pains.      Onset: pt states that she is doing better than last time with her behcet's. Today has severe stomach pains, states that she is constipated. Pt had a seizure 2 days ago. Does have a metallic taste in her mouth  Involved joints: hands , neck, shoulders  Pain scale:  9/10 from stomach pain;      Wakes the patient from sleep : Yes  Morning stiffness:Yes for 120 minutes  Meds used:cochicine , otezla 30mg twice daily     Interim history  Since last visit:  1. Infections - No  2. New symptoms/medical problem - Yes/ the cartilage in her chest is inflamed  3. Any side effects from Rheum medications -nausea from the otezla  3. ER visits/Hospitalizations/surgeries - No  4. Last PCP visit: yesterday     January 16, 2018  Have you ever seen a rheumatologist Yes Who You When 10/18/17  Joint pain history  Onset: pt states that she was in the hospital for 5 days before maribell, they told her she had lesions in her brain in the white matter. Had blood work drawn in the ER and they told her her inflammatory markers were elevated. Was seen in the ER last week for vomiting and diarrhea, not eating. Saw Gastro. On 1/10/18. 1.5 weeks ago she had an endoscopy and colonoscopy. She has been having elbow  And hands and knee pain, loosing use of her hands. Been dropping things. Also states that she has been getting lesions up her nose  Involved joints:  see above  Pain scale:  8/10     Wakes the patient from sleep : Yes  Morning stiffness:Yes for 120 minutes  Meds used:colchisine, otezla, magic mouth wash, kenolog cream, lidocaine gel     Interim history  Since last visit:  1. Infections - Yes/ had an infection in her jaw. Having sinus issues  2. New symptoms/medical problem - Yes/ states that she is having problems walking, states that she was bouncing when she was walking  3. Any side effects from Rheum medications -otezla, nausea  3. ER visits/Hospitalizations/surgeries - Yes/ see chart  4. Last PCP visit: November 2017 April 17, 2018  Have you ever seen a rheumatologist Yes Who You When 1/16/18  Joint pain history  Onset: pt states that she is not doing well. She is having increased stomach issues, only eats 2 times in 4 days unable to keep the otezla down due to vomiting . Has sleep study done in 1 week; no genital ulcers since last appointment. 6 small mouth sores since last appointment.   Involved joints: see above   Pain scale:  7/10     Wakes the patient from sleep : Yes  Morning stiffness:Yes for 60 minutes  Meds used:otezla , colchicine, diclofenac gel, magic mouthwash, lidocaine gel      Interim history  Since last visit:  1. Infections - Yes/ had a sinus infection, thinks she is getting sick now  2. New symptoms/medical problem - Yes/ neurology sent pt to cardiology. Has a heart condition but not sure what . She is seeing a ortho. Due to knee pain   3. Any side effects from Rheum medications -nausea/vomiting from the otezla   3. ER visits/Hospitalizations/surgeries - Yes/ seen in ER   4. Last PCP visit: yesterday     July 17, 2018  Have you ever seen a rheumatologist Yes Who You When 4/17/18  Joint pain history  Onset: pt Is here for a follow-up states that she started to get lesions on her legs , face and arm. Hurts all over, lower back is very painful. Right hand and wrist area are numb  Involved joints: see above  Pain scale:  7/10   worse in  the morning  Wakes the patient from sleep : Yes/ does not go to sleep till late  Morning stiffness:Yes for 4-5 hours minutes  Meds used:colchicine, otezla has  Not started otezla yet due to extreme nausea      Interim history  Since last visit:  1. Infections - Yes/ had a bacterial infection in her pelvic area was hospitalized  2. New symptoms/medical problem - Yes/ hands are swelling up. Having orthopedic issues. Was having problem with her veins sticking up, and very painful. Has happened multiply times   3. Any side effects from Rheum medications -see above  3. ER visits/Hospitalizations/surgeries - Yes/ hospital and ER for bacterial infection  4. 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 ER said may have blood clot in calf, but when did MRI, stated body may have broken it up on it's own. Elevated D-dimer  Involved joints: Knees, neck, hands  Pain scale:  6.5/10     Wakes the patient from sleep : Yes  Morning stiffness:Yes for 180 minutes  Meds used:hyoscyamine, not taking otezla. Last dose Sep. Patient did not want to take otezla with the antibiotics.      Last major mouth ulcer- aug or Sep  No genital ulcers in the last 6 months.      Interim history  Since last visit:  1. Infections - Yes, UTI's twice a month since last visit  2. New symptoms/medical problem - No  3. Any side effects from Rheum medications -otzela causes nausea  3. ER visits/Hospitalizations/surgeries - Yes, 1/2/19 repaired some ligaments and mass taken out, also joint build up removed from a previous injury  4. Last PCP visit: 12/5/19 June 12, 2019  Have you ever seen a rheumatologist yes Who you When 1/9/19  Joint pain history  Onset: Patient is here for a follow up. A lot of infections and in and out of the hospital, who wanted her to follow up with Rheumatology  again.  Involved joints: knees, legs, back, neck, shoulders, ankles  Pain scale:  6.5/10     Wakes the patient from sleep : Yes  Morning stiffness:Yes for 120 minutes  Meds used:magic mouthwash, colchicine     Interim history  Since last visit:  1. Infections - Yes, staph  2. New symptoms/medical problem - kidney failure  3. Any side effects from Rheum medications -none  3. ER visits/Hospitalizations/surgeries - Yes, 3 hospitalizations, and surgeries,  4. Last PCP visit: 6/11/19 9/30/2020: New to me, establishing care. Flaring off otezla.     816   Reports worsening oral ulcers and joint pain and skin rash.     No vaginal ulcers currently. Last ones were 5 months ago.     Gets oral ulcers monthly, not today, had them last few days ago. They come up as flare up around period time. They self-resolve.     Thinks colcrys is helping but not enough, lost some benefit. No longer has diarrhea from it. the dose is 0.6 mg bid.     Came off otezla last year when she had severe staff infection, there was drug drug interaction with vancomycin. Wants to go back on it.     Skin lesions over chest are clearing up. Has skin lesions over her legs.     She is off of otezla >1 yr. She had daily vomiting and abdominal cramps initially which later resolved after 2 months.      Today, is her last day liz her health insurnaace  .     Reports pain and swelling liz hands. Drops things, lost strenghth. Veins look inflamed. Hands are swollen in AM and before bedtime. AM stiffness is 2 hours. can t tolerate ibuprofen.     Prednnisonne does not help much.     Wants to try eleve.     Had 2 blood clots over L arm, finished xraalto 4 months ago.      Was told to have severe dry eyes, hs not had eyes checked> 1 yr as was in the hospital with staff infection. systanee nd gentle eyedrops help some, sometimes gets sharp pain and and blurry vision in her eyes from dryness. No h/o uveitis.     has dry mouth, drinks water.     Gets T  F  sometimes. It is sporadic.     Lost hair.     Gets intermittent CP. Had several hospital visits and admissions in the pst for it. lorazepam helped witch chest spasms, she does not like pain meds.     She was prescribed 0.5 mgq d prn, 10 tbs/monthss.     She gets dry cough, just started using albuterol inhaler, it helps.     No SOB.     Has gastroparesis, IBS  nd h/o severas admission for constiption, colon blockage with impaction and gets bloating. has lost follow up with GI.     She is working with  to get medical assistance to get insurance back by early next year. She filed it again.     Gets botox inj every 3 months nd they help, has to cancel 10/20 botox inj s will not have insurance. they came back yesterday.     last seizure waas last year. still gets black out spells, salts ne was last week.    derm eye gi       FHx: Both parents have RA.  SHx: She was working in a [employer] shop, it was closed because of pandemic. sexually active, not on oral contraceptives. She thinks she had a misccraaaiaage last wk, had n period x2 months then mueller bleeding a lot, dark red and was different from period. Felt something came out that she feels she miscarried, no pos pregnancy test. No smoking. rarely drinks ETOH.  [facility]   thumbs between MCP tender   otezla helped with skin lesions, oral ulcers, joint pain.  colcrys  sjogre aleve biotene voltaren [id number]        8/11/2022: Samara presents for urgent visit to discuss m/o Behcet's flare during pregnancy, she is   She is pregnant 10 wk 3 days with ADRIAN 3/6/2023.  In 2020, had 1st trimester miscarriage.  Has LBP, ankle pain/swelling.   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 Discussed with MFM, back on low dose otezla since last week, only 1 tab a day.    Interval History 10/28/2022  Mrs. Oropeza was last seen 08/11/22. At that time she was advised to continue otezla (1 tablet BID)  and remain off colcrys. Since that time, she has instead taken otezla 1 tablet daily. She notes more joint pain in hands, wrists, cervical spine, knees with 4 hours of monring stiffness. Previously <1hr with full strength.     Had a few genital ulcers recently which only lasted a few days. Oral ulcers have been more active of late and are currently healing.     Abdominal pain on L side, pain is always worse after eating. Out of the phase of her pregnancy with morning sickness, at 22w on Monday.     She describes a few fevers at the end of September with hot flashes and cold sweats with Tmax 100. Called OB-GYN who instructed she should go in if persistent, though it resolved within a few days.     More easy bruising - thighs, she is currently on lovenox and baby aspirin. Previous history of clots especially with interventions. She recently had an issue with superficial thrombophlebitis following a blood draw.     Seeing more floaters in her vision. Sometimes tiny and clear, but can also be larger and 'gray', up to 1/4 of her vision. Has not seen Ophthalmology in many years.     More frequent migraines - did a nerve block recently but this has worn off, previous botox injections.     ADRIAN 03/06/23.    1/25/23:    Baby is growing small. Had high BP yesterday, they would monitor it. DBP was 90.    Increasing otezla to bid helped. It helped with ulcers. Has skin breakdown since Christmas, never had it like this over her chest, but still it feels related to Behcet.    Hands and ankles are painful and swoleln, ankles are new but had hand pain with behcet before,. This started fater entering 3rd [facility]. Her ADRIAN might be earlier based on baby's growth, induction at 37 wk, progress at 39. nex twk will have another check.    Still has the neck pain. Still gets eye floaters.    6/21/2023:    She delivered 1 month early due to pre-eclampsia. Had Mg infusion, had bleeding issues, spent a week. Was on BP med till a month ago, now  stable. Was induced, had NVD. No blood transfusions needed.    Her baby girl Lashanda was born 2/12/2023, small, slow growth but healthy with no fetal deformities. Had vaginal sores after giving birth. Still gets mouth ulcers, one oral ulcer now, no vaginal ulcers now. Plans to breast feed x 7 months. Does breastfeeding. Saw OB/GYN in 4/2023 at [facility]. They are aware she is on otezla.    She was on ASA 81 mg every day during pregnancy.    9/27/23: Doing well, less behcet flares since giving birth. Most recent flare on her chest, now healing. No new oral or vaginal ulcers.     She has been having back pain that shoots down her right leg since June and has been utilizing PT without improvement.     She also has had swelling and pain in both hands and right wrist, as well as both ankles L>R.     She notes missing some doses of otezla due to running out of refills and avoiding taking it without food. She has been eating less because she has been more tired since having her daughter.       2/1/2024:    Has back pain. Had epidural inj one wk ago, has to wait x 2 weeks, if no help to get surgery for bulging disc    Some oral lesions 2wk ago flare with swollen hands, but currently has no oral ulcers.     Hands hurt today with pain level about 4/10. Celebrex did not help much.    No vaginal ulcers    Reports loss of appetite past 5 months    Her baby girl was sick x past 2 weeks, just got better last night, she had RSV. Samara did not get much sleep, did not eat much over past 2 weeks because of this.      6/5/2024:    -active inflammatory arthritis despite adding HCQ last visit. HCQ causes GI upset    -fingers are deforming, new    -not feeling well, has neck pain, nausea x past few days    -still breastfeeding, no pregnancy plans    -missed otezla x 2 days, got some skin lesions back over chest area; otherwise otezla is managing her behcet's sores well    Review of Systems   A comprehensive ROS was done. Positives  are per HPI.    Past Medical History  Past Medical History:   Diagnosis Date     Anemia      Anxiety      Arthritis      Behcet's disease (H)      Cervical adenitis May 2010     Chronic abdominal pain      Constipation, chronic 1994     Fibromyalgia      Gastro-oesophageal reflux disease      Gastroparesis      Irregular heart beat     tachycardia, has had workup     Migraines      Neuromuscular disorder (H)     fibramyalgia     Palpitations      PONV (postoperative nausea and vomiting)      Seizure (H)      Seizures (H)     unknown etiology     Syncope      Tourette's       Past Surgical History:   Procedure Laterality Date     ARTHROSCOPY ANKLE, OPEN REPAIR LIGAMENT, COMBINED Left 9/25/2019    Procedure: LEFT ANKLE ARTHROSCOPY WITH LIGAMENT REPAIR;  Surgeon: Andres Johnson DPM;  Location:  OR     ARTHROSCOPY ANKLE, REPAIR LIGAMENT Left 1/2/2019    Procedure: Ankle arthroscopy and sinus tarsi evacuation, ligament repair, left lower extremity;  Surgeon: Andres Johnson DPM;  Location:  OR     ARTHROSCOPY KNEE WITH PATELLAR REALIGNMENT  7/25/2013    Procedure: ARTHROSCOPY KNEE WITH PATELLAR REALIGNMENT;  Left Knee Arthroscopy, Medial Patellofemoral Ligament Reconstruction with Allograft  ;  Surgeon: Jennifer Acevedo MD;  Location: [facility]     COLONOSCOPY  2015     DENTAL SURGERY  1996    Teeth removal     ENDOSCOPY UPPER, COLONOSCOPY, COMBINED  2005     HC ESOPH/GAS REFLUX TEST W NASAL IMPED >1 HR N/A 2/15/2017    Procedure: ESOPHAGEAL IMPEDENCE FUNCTION TEST WITH 24 HOUR PH GREATER THAN 1 HOUR;  Surgeon: Timothy Matta MD;  Location: [facility]     IR LUMBAR PUNCTURE  1/22/2024     IR PICC PLACEMENT > 5 YRS OF AGE  3/13/2019     IR UPPER EXTREMITY VENOGRAM LEFT  2/25/2020     IRRIGATION AND DEBRIDEMENT FOOT, COMBINED Left 3/12/2019    Procedure: COMBINED IRRIGATION AND DEBRIDEMENT LEFT ANKLE;  Surgeon: Micha Glover MD;  Location: [facility]     IRRIGATION AND DEBRIDEMENT LOWER  EXTREMITY, COMBINED Left 5/7/2019    Procedure: 1.  Excision of wound down to and including deep fascia, less than 20 cm2.  2.  Irrigation and debridement, left ankle.;  Surgeon: Andres Johnson DPM;  Location: [facility] OR     PICC INSERTION Left 05/05/2019    4Fr - 45cm (5cm external), Basilic vein, SVC RA junction      Patient Active Problem List    Diagnosis Date Noted     Preeclampsia 02/10/2023     Priority: Medium     Infection of joint of ankle (H) 05/06/2019     Priority: Medium     Cellulitis 03/09/2019     Priority: Medium     Displacement of lumbar intervertebral disc without myelopathy 11/13/2018     Priority: Medium     Overview:   Created by Conversion       Iron deficiency associated with nonfamilial restless legs syndrome 11/13/2018     Priority: Medium     Enthesopathy of hip region 11/13/2018     Priority: Medium     Overview:   Created by Conversion       Tourette's syndrome 11/13/2018     Priority: Medium     Overview:   Created by Conversion       Somatic symptom disorder 06/01/2018     Priority: Medium     Pelvic floor weakness 04/25/2018     Priority: Medium     Spells of decreased attentiveness 12/19/2017     Priority: Medium     Mobile cecum (H) 11/09/2017     Priority: Medium     Cecum noted in Right lower quadrant on 4/17 CT scan, and in Left upper Quadrant on CT on 11/2017.       Vitamin D deficiency 10/11/2017     Priority: Medium     How low, unknown/not found. On D when tested at 27. Starting cholecalciferol October 2017. Needs recheck.       Convulsions, unspecified convulsion type (H) 10/03/2017     Priority: Medium     Transient alteration of awareness 10/03/2017     Priority: Medium     Chronic pain syndrome 07/27/2017     Priority: Medium     Major depressive disorder, recurrent episode, moderate (H) 06/27/2017     Priority: Medium     Cervical pain 05/02/2017     Priority: Medium     Acute left ankle pain 03/31/2017     Priority: Medium     Cervical dystonia 03/28/2017      Priority: Medium     PTSD (post-traumatic stress disorder) 01/17/2017     Priority: Medium     Patellofemoral instability 10/20/2016     Priority: Medium     Fibromyalgia 08/04/2016     Priority: Medium     Rheumatoid arthritis of multiple sites without rheumatoid factor (H) 08/04/2016     Priority: Medium     Raynaud's disease without gangrene 08/04/2016     Priority: Medium     Chronic abdominal pain 08/04/2016     Priority: Medium     Palpitations 01/12/2016     Priority: Medium     On colchicine therapy 10/30/2015     Priority: Medium     Spell of shaking 05/06/2015     Priority: Medium     Migraine 02/04/2015     Priority: Medium     Behcet's disease (H) 12/10/2014     Priority: Medium     Headaches due to old head injury 11/12/2013     Priority: Medium     Milk protein intolerance 10/11/2013     Priority: Medium     Intestinal malabsorption 10/11/2013     Priority: Medium     Concussion 02/13/2013     Priority: Medium     Jan 2013, with prolonged recovery- followed by sports med         Knee pain 01/03/2013     Priority: Medium     Generalized anxiety disorder 06/25/2009     Priority: Medium     Tics - Tourette syndrome 05/18/2009     Priority: Medium     Followed by psychotherapy. Symptoms well managed. [facility] ([facility] Simpson)           IBS (irritable bowel syndrome) 05/18/2009     Priority: Medium     Allergic rhinitis 05/18/2009     Priority: Medium     GERD (gastroesophageal reflux disease) 01/10/2008     Priority: Medium     Gastroparesis 1994     Priority: Medium      Family History   Problem Relation Age of Onset     Depression Mother      Neurologic Disorder Mother         Migraines, take imitrex injection.  Also in maternal grandmother.       Alcohol/Drug Father      Hypertension Father      Depression Father      Osteoarthritis Father      Cardiovascular Maternal Grandmother      Depression Maternal Grandmother      Hypertension Maternal Grandmother       Alzheimer Disease Maternal Grandmother      Cardiovascular Maternal Grandfather      Hypertension Maternal Grandfather      Depression Maternal Grandfather      Alcohol/Drug Maternal Grandfather      Diabetes Maternal Grandfather      Cardiovascular Paternal Grandmother      Hypertension Paternal Grandmother      Cardiovascular Paternal Grandfather      Hypertension Paternal Grandfather      Glaucoma No family hx of      Macular Degeneration No family hx of       Allergies   Allergen Reactions     Amoxil [Penicillins] Rash     Dad unsure of reaction.     Bee Venom Anaphylaxis     Bioflavonoids Anaphylaxis     Citrus Anaphylaxis     All [place]     Contrast Dye Rash     Contrast Media [place] [facility] [place] Contrast Media Ready-[place] [facility] [place].  NOTE: this is a contrast media oral with iodine. Premedicate with methylpred standard for IV contrast, request barium contrast for oral contrast.     Iodinated Contrast Media Hives and Rash     Contrast Media Ready-Box [facility] [place].; Contrast Media Ready-[place] [facility] [place].  NOTE: this is a contrast media oral with iodine. Premedicate with methylpred standard for IV contrast, request barium contrast for oral contrast.     Pineapple Anaphylaxis, Difficulty breathing and Rash     Reglan [Metoclopramide] Other (See Comments)     IV dose only, in ER, rapid heart rate.     Ace Inhibitors      Difficulty in breathing and GI upset     Amitiza [Lubiprostone] Nausea and Vomiting     Amoxicillin-Pot Clavulanate      Midazolam Unknown     parent states that when pt takes this medication, she wakes up being very violent .     Midazolam Hcl      Coming out of pelvic exam at age of 5, was kicking and screaming when coming out of the versed.     Other [No Clinical Screening - See Comments]      Bleech/ chest tightness, itchy throat, swollen tongue, hives     Tizanidine Other (See Comments)     Confusion, back pain, photophobia, abdominal pain, shaking, anxious       Adhesive  Tape Rash     Azithromycin Hives and Rash     Cephalexin Itching and Rash     Sulfa Antibiotics Rash     Skin scarring      Current Outpatient Medications   Medication Sig Dispense Refill     albuterol (PROAIR HFA/PROVENTIL HFA/VENTOLIN HFA) 108 (90 Base) MCG/ACT inhaler Inhale 2 puffs into the lungs every 6 hours as needed for shortness of breath / dyspnea or wheezing 1 Inhaler 1     amitriptyline (ELAVIL) 25 MG tablet TAKE ONE TABLET BY MOUTH AT BEDTIME 90 tablet 0     apremilast (OTEZLA) 30 MG tablet Take 1 tablet (30 mg) by mouth 2 times daily Hold for signs of infection, and seek medical attention. 180 tablet 3     artificial tears OINT ophthalmic ointment 0.5 inch strip each eye at night 1 Tube 11     azelaic acid (FINACIA) 15 % external gel Once daily to scars, upper chest and small portion of back and spot on belly button. Hold if irritating 50 g 5     benzocaine (AMERICAINE) 20 % external aerosol Apply to perineum four times daily as needed for pain 57 g 3     benzocaine (TOPICALE XTRA) 20 % GEL Apply 1 g to affected area 4 times daily as needed for mouth sores. 30 g 5     benzocaine (TOPICALE XTRA) 20 % GEL Apply as needed locally to mouth or nasal ulcers for pain; 4 times daily as needed 30 g 1     betamethasone valerate (VALISONE) 0.1 % cream Apply topically 2 times daily as needed        bisacodyl (DULCOLAX) 5 MG EC tablet Take 2 tablets (10 mg) by mouth daily as needed for constipation 30 tablet 0     citalopram (CELEXA) 20 MG tablet Take 1 tablet (20 mg) by mouth daily 90 tablet 1     EPINEPHrine (EPIPEN 2-EAMON) 0.3 MG/0.3ML injection Inject 0.3 mLs (0.3 mg) into the muscle as needed for anaphylaxis 0.6 mL 3     etonogestrel (NEXPLANON) 68 MG IMPL Inject 68 mg Subcutaneous       fluocinonide (LIDEX) 0.05 % external gel Apply topically 2 times daily. 60 g 11     folic acid (FOLVITE) 1 MG tablet Take 1 tablet (1 mg) by mouth daily 90 tablet 3     gabapentin (NEURONTIN) 300 MG capsule Take 1 capsule (300  "mg) by mouth every morning 60 capsule 1     hydrocortisone, Perianal, (ANUSOL-HC) 2.5 % cream Place rectally 3 times daily as needed for hemorrhoids 30 g 0     hydroquinone (ARACELY) 4 % external cream Apply once daily for 3 months. Stop for one month. If further lightening is desired, proceed with one additional month of treatment. 28 g 1     hydrOXYzine HCl (ATARAX) 25 MG tablet TAKE ONE OR TWO TABLETS BY MOUTH EVERY SIX HOURS AS NEEDED       hypromellose (GENTEAL) 0.3 % SOLN 1 drop every hour as needed for dry eyes        lactulose 20 GM/30ML SOLN Take 30 mLs by mouth 3 times daily as needed (for constipation) 300 mL 3     lanolin ointment Apply topically every hour as needed for other (sore nipples) 7 g 3     lidocaine (LMX4) 4 % external cream Apply topically once as needed for mild pain 120 g 1     LINZESS 290 MCG capsule TAKE 1 CAPSULE BY MOUTH EVERY DAY IN THE MORNING BEFORE BREAKFAST 90 capsule 0     methotrexate sodium, PF, 50 MG/2ML SOLN injection Inject 0.4 mLs (10 mg) Subcutaneous every 7 days 10 mL 1     mometasone (ELOCON) 0.1 % external ointment Apply topically 2 times daily. 45 g 11     ondansetron (ZOFRAN ODT) 8 MG ODT tab Take 1 tablet (8 mg) by mouth every 8 hours as needed 90 tablet 3     polyethylene glycol (MIRALAX/GLYCOLAX) powder Take 1 capful by mouth 3 times daily       Prenatal MV-Min-Fe Fum-FA-DHA (PRENATAL 1 PO)        rizatriptan (MAXALT-MLT) 5 MG ODT DISSOLVE 1 OR 2 TABLETS IN THE MOUTH AS NEEDED FOR HEADACHE AT ONSET OF MIGRAINE, MAY REPEAT IN 2 HOURS AS NEEDED. MAX 30MG IN 24 HOURS AND MAX 5 DAYS PER MONTH       Sharps Container MISC Use for methotreaxte shots 1 each 11     sodium fluoride 1.1 % CREA Apply 1 Application topically daily       sucralfate (CARAFATE) 1 GM/10ML suspension Take 10 mLs (1 g) by mouth 4 times daily 1200 mL 2     syringe/needle, sisp, 25G X 5/8\" 1 ML MISC Inject 1 Syringe Subcutaneous every 7 days Use with Methotrexate 100 each 3     triamcinolone (KENALOG) " "0.1 % external ointment Apply twice daily as needed to lesions on the genitals and body. OK to use very sparingly on the face. 454 g 2           Physical Exam   /82   Pulse 102   Temp 97.8  F (36.6  C) (Oral)   Ht 1.6 m (5' 3\")   Wt 65.3 kg (144 lb)   SpO2 98%   BMI 25.51 kg/m    GA: NAD, pleasant  HEENT: nl sclera/conj, small area of erythema over L buccal mucosa  Neck: no cervical LAP  Chest: CTAB  CV: no M/R/G, RRR  Abdomen: soft, NT  MSK: Jaccoud arthropathy affecting 4th and 5th fingers, no synovitis or tenderness  Skin: no rash  Neuro: non focal  Psych: nl affect             Data   Data     10/28/2022   BEAUCHAMP-28 (ESR) --   BEAUCHAMP-28 (CRP) --   Tender (BEAUCHAMP-28) 2 / 28    Swollen (BEAUCHAMP-28) 0 / 28    Provider Global --   Patient Global --   ESR 17 mm/hr   CRP --     No results found for any visits on 01/15/25.  CBC RESULTS: Recent Labs   Lab Test 09/07/20  1147   WBC 4.1   RBC 5.09   HGB 13.0   HCT 43.0   MCV 85   MCH 25.5*   MCHC 30.2*   RDW 15.0        TSH   Date Value Ref Range Status   09/07/2020 0.77 0.40 - 4.00 mU/L Final   03/21/2019 0.53 0.40 - 4.00 mU/L Final   10/30/2018 1.06 0.40 - 4.00 mU/L Final   11/26/2016 0.87 0.40 - 4.00 mU/L Final     T4 Free   Date Value Ref Range Status   01/31/2007 1.26 0.70 - 1.85 ng/dL Final     Rheumatoid Factor   Date Value Ref Range Status   02/06/2024 <10 <14 IU/mL Final   09/30/2020 <7 <12 IU/mL Final   05/06/2016 <20 <20 IU/mL Final       Reviewed Rheumatology lab flowsheet         Again, thank you for allowing me to participate in the care of your patient.      Sincerely,    Dominga Sosa MD    "

## 2025-01-15 NOTE — NURSING NOTE
"Chief Complaint   Patient presents with    RECHECK     Follow Up     /82   Pulse 102   Temp 97.8  F (36.6  C) (Oral)   Ht 1.6 m (5' 3\")   Wt 65.3 kg (144 lb)   SpO2 98%   BMI 25.51 kg/m    Surekha Yun on 1/15/2025 at 11:02 AM    "

## 2025-01-16 ENCOUNTER — TELEPHONE (OUTPATIENT)
Dept: OPHTHALMOLOGY | Facility: CLINIC | Age: 30
End: 2025-01-16
Payer: COMMERCIAL

## 2025-01-16 LAB
GAMMA INTERFERON BACKGROUND BLD IA-ACNC: 0.02 IU/ML
M TB IFN-G BLD-IMP: NEGATIVE
M TB IFN-G CD4+ BCKGRND COR BLD-ACNC: 9.98 IU/ML
MITOGEN IGNF BCKGRD COR BLD-ACNC: 0 IU/ML
MITOGEN IGNF BCKGRD COR BLD-ACNC: 0.01 IU/ML
QUANTIFERON MITOGEN: 10 IU/ML
QUANTIFERON NIL TUBE: 0.02 IU/ML
QUANTIFERON TB1 TUBE: 0.02 IU/ML
QUANTIFERON TB2 TUBE: 0.03

## 2025-01-16 NOTE — TELEPHONE ENCOUNTER
Dark Red spots on the whites of right eye eyes and dark ring around cornea.  Burning and throbbing sensation.  Vision seems a little more blurry on the right eye.      Floaters right eye     Will schedule 21st at 10:30 am.  Ok per Vijay García on 1/16/2025 at 11:17 AM

## 2025-01-16 NOTE — TELEPHONE ENCOUNTER
[facility] Call Center    Phone Message    May a detailed message be left on voicemail: yes     Reason for Call: Appointment Intake    Referring Provider Name:     Dominga Sosa MD in  ADULT RHEUMATOLOGY       Diagnosis and/or Symptoms:     Behcet's disease   Eye pain, bilatera   Behcet's, eye pain x 4 days, floaters, concern for uveitis     Please review, pt would like to schedule.  This is an urgent referral for pt to be seen within 3-5 days    Action Taken: Message routed to:  [facility]    Travel Screening: Not Applicable     Date of Service:

## 2025-01-20 ENCOUNTER — MYC MEDICAL ADVICE (OUTPATIENT)
Dept: RHEUMATOLOGY | Facility: CLINIC | Age: 30
End: 2025-01-20
Payer: COMMERCIAL

## 2025-01-21 ENCOUNTER — OFFICE VISIT (OUTPATIENT)
Dept: OPHTHALMOLOGY | Facility: CLINIC | Age: 30
End: 2025-01-21
Payer: COMMERCIAL

## 2025-01-21 DIAGNOSIS — M35.2 BEHCET'S DISEASE (H): Primary | ICD-10-CM

## 2025-01-21 DIAGNOSIS — H04.123 DRY EYES: ICD-10-CM

## 2025-01-21 DIAGNOSIS — H26.33 CATARACT OF BOTH EYES DUE TO DRUG: ICD-10-CM

## 2025-01-21 PROCEDURE — 92134 CPTRZ OPH DX IMG PST SGM RTA: CPT

## 2025-01-21 PROCEDURE — 92250 FUNDUS PHOTOGRAPHY W/I&R: CPT

## 2025-01-21 PROCEDURE — G0463 HOSPITAL OUTPT CLINIC VISIT: HCPCS

## 2025-01-21 RX ORDER — MINERAL OIL AND PETROLATUM 150; 830 MG/G; MG/G
1 OINTMENT OPHTHALMIC AT BEDTIME
Qty: 3.5 G | Refills: 11 | Status: SHIPPED | OUTPATIENT
Start: 2025-01-21

## 2025-01-21 ASSESSMENT — EXTERNAL EXAM - LEFT EYE: OS_EXAM: NORMAL

## 2025-01-21 ASSESSMENT — CONF VISUAL FIELD
OS_INFERIOR_TEMPORAL_RESTRICTION: 0
OS_INFERIOR_NASAL_RESTRICTION: 0
OD_INFERIOR_NASAL_RESTRICTION: 0
OS_SUPERIOR_NASAL_RESTRICTION: 0
OD_NORMAL: 1
OD_SUPERIOR_NASAL_RESTRICTION: 0
OD_INFERIOR_TEMPORAL_RESTRICTION: 0
OD_SUPERIOR_TEMPORAL_RESTRICTION: 0
OS_SUPERIOR_TEMPORAL_RESTRICTION: 0
METHOD: COUNTING FINGERS
OS_NORMAL: 1

## 2025-01-21 ASSESSMENT — REFRACTION_WEARINGRX
OD_SPHERE: -2.50
OS_CYLINDER: SPHERE
OD_CYLINDER: SPHERE
OS_SPHERE: -2.50

## 2025-01-21 ASSESSMENT — VISUAL ACUITY
CORRECTION_TYPE: GLASSES
OS_CC: 20/20
OD_CC+: -2
OS_CC+: -3
OD_CC: 20/20
METHOD: SNELLEN - LINEAR

## 2025-01-21 ASSESSMENT — CUP TO DISC RATIO
OD_RATIO: 0.35
OS_RATIO: 0.35

## 2025-01-21 ASSESSMENT — SLIT LAMP EXAM - LIDS
COMMENTS: NORMAL
COMMENTS: NORMAL

## 2025-01-21 ASSESSMENT — TONOMETRY
IOP_METHOD: TONOPEN
OD_IOP_MMHG: 15
OS_IOP_MMHG: 16

## 2025-01-21 ASSESSMENT — EXTERNAL EXAM - RIGHT EYE: OD_EXAM: NORMAL

## 2025-01-21 NOTE — PATIENT INSTRUCTIONS
Please use preservative free artificial tears hourly in each eye.    Please apply artificial tear ointment at bedtime.    Please start Xiidra twice daily in each eye.    Return to clinic in one month for cataract surgery evaluation.

## 2025-01-21 NOTE — PROGRESS NOTES
HPI       Eye Pain Both Eyes      In both eyes.  Characterized as aching and burning.  Pain was noted as 4/10.  Associated symptoms include blurred vision, redness and headaches.  Negative for tearing.  Treatments tried include artificial tears.             Comments    She states that for the past 2 weeks she has had eye pain, worse in her right eye.  On January 11 she developed a red spot on her right sclera.  She has a photo on her phone of the spot, which has mostly resolved.      Her eye pain is worse in bright lights.  She started seeing more floaters last week in each eye.  Her eyes feel dry.  She uses both artificial tears during the day and ointment at night.    Patient was recently told that she has cataracts and she would like more information on her cataracts.    She was diagnosed with Behcet's disease in 2014, she tells me.    YADIRA Mac 10:39 AM  January 21, 2025               Last edited by Annie Cross COT on 1/21/2025 10:51 AM.          Review of systems for the eyes was negative other than the pertinent positives/negatives listed in the HPI.      She reports skin lesions (chest) and joint pain (ankles, neck, hands and back), also has some oral lesions (cheeks and roof of mouth). She states she flares approximately once per month. She is currently experiencing burning eye pain and floaters currently. On the 11th she had a dark red spot that developed on her right eye.She has had floaters previously during her flares. She also has burning eye pain. She has not seen an eye doctor in a long time; she states at the time of evaluation she was told she did not have uveitis. She is using Systane OTC q1h. She reports that it is not helping. She also has been using an ointment  at bedtime but she recently ran out; she feels this was helping.     She takes Otezla long term for treatment of Behcet's. She was started on Azathioprine on 1/15 by rheumatology. She is currently breastfeeding. Her child  was born in February 2023. She reports that she took oral prednisone for one year several years ago.    She endorses halos around lights and difficulty driving at night. She has stopped driving at night due to severity of glare at night.    Assessment & Plan      Samara Oropeza is a 29 year old female with the following diagnoses:   1. Behcet's disease (H)    2. Dry eyes    3. Cataract of both eyes due to drug       Patient presented with concern for ocular inflammation in the setting of Behcet's disease flare. Her exam demonstrates a resolving ДМИТРИЙ in the right eye, however no evidence of scleritis or anterior/posterior segment inflammation. With respect to her ДМИТРИЙ, she denies recent trauma and her platelets were checked on 1/15 and were normal. It occurred prior to recently starting azathioprine which could cause some bone marrow suppression.    She has posterior polar cataracts of each eye likely related to prior oral steroid use.     Dry eyes, each eye  - Continue PFAT q1h PRN  - Start artificial tear ointment at bedtime  - Start Xiidra BID each eye     Posterior polar cataracts, each eye  - Cataract evaluation at follow up (undilated to determine if PSCs are on axis, BAT, IOL calcs, consider hard contact lens refraction)    Patient seen with Dr. Bosch.    Patient disposition:   Return in about 4 weeks (around 2/18/2025) for Follow Up with Dr. Bosch for cataract surgery eval - VT, BAT, IOL calcs.         Attending Physician Attestation:  Complete documentation of historical and exam elements from today's encounter can be found in the full encounter summary report (not reduplicated in this progress note).  I personally obtained the chief complaint(s) and history of present illness.  I confirmed and edited as necessary the review of systems, past medical/surgical history, family history, social history, and examination findings as documented by others; and I examined the patient myself.  I personally reviewed  the relevant tests, images, and reports as documented above.  I formulated and edited as necessary the assessment and plan and discussed the findings and management plan with the patient and family. Attending Physician Image/Tesing Attestation: I personally reviewed the ophthalmic test(s) associated with this encounter, agree with the interpretation(s) as documented by the resident/fellow, and have edited the corresponding report(s) as necessary.  - Christos Bosch MD

## 2025-01-21 NOTE — NURSING NOTE
Chief Complaints and History of Present Illnesses   Patient presents with    Eye Pain Both Eyes     Chief Complaint(s) and History of Present Illness(es)       Eye Pain Both Eyes              Laterality: In both eyes    Quality: aching and burning    Pain scale: 4/10    Associated symptoms: blurred vision, redness and headaches.  Negative for tearing    Treatments tried: artificial tears              Comments    She states that for the past 2 weeks she has had eye pain, worse in her right eye.  On January 11 she developed a red spot on her right sclera.  She has a photo on her phone of the spot, which has mostly resolved.      Her eye pain is worse in bright lights.  She started seeing more floaters last week in each eye.  Her eyes feel dry.      She was diagnosed with Behcet's disease in 2014, she tells me.    Annie Cross, COT 10:39 AM  January 21, 2025

## 2025-01-21 NOTE — TELEPHONE ENCOUNTER
Dominga Sosa MD  You10 hours ago (10:04 PM)     The plan is to try imuran 1st, if she fails, to switch to remicade.         Updated the above to patient via [employer].         Kori Carrillo RN  [facility]

## 2025-02-02 NOTE — PROGRESS NOTES
[facility]    PM&R CLINIC NOTE  BOTULINUM TOXIN PROCEDURE      CC: Chronic Migraine Headaches    HPI  Samara Oropeza is a 29 year old female who presents to clinic for botulinum toxin injections for management of chronic migraine headaches.     SINCE LAST VISIT  Samara Oropeza was last seen here in clinic on 11/12/2024, at which time she received 200 units of Botox as well as a nerve block in the greater and lesser occipital nerves    Patient denies new medical diagnoses, illnesses, hospitalizations, emergency room visits, and injuries since the previous injection with botulinum neurotoxin. 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 She is then set to start Methotrexate subcutaneous injections once she is no longer breastfeeding    RESPONSE TO PREVIOUS TREATMENT    Side effects: Issues lifting her head off of a pillow for about a month. This has happened with almost every injection.     1.  Headache frequency during this injection cycle: She reports ongoing daily migraine before and after the injections and has a migraine today. This is compared to her baseline headache frequency of 30 headache days per month.     2.  Headache duration during this injection cycle:  Headache duration was usually a few hours to a few days . Patient reports a few episodes of multiple day headaches during this injection cycle, particularly as the Botox was wearing off.     3.  Headache intensity during this injection cycle:    A.  7-8/10  =  Typical pain level.  B.  8/10  =  Worst pain level - this has only occurred over the last two weeks as Botox has been wearing off.   C.  2/10  =  Lowest pain level.    4.  Change in headache medication usage during this injection cycle:  (For Example:  Able to decrease use of oral pain medications.) She has been taking Tylenol and ibuprofen as needed for  her migraine headaches. She will take rizatriptan if the OTC medications do not work. For prevention, she uses Amitripyline     5.  ER Visits During This Injection Cycle:  None.     6.  Functional Performance:  Change in ADL's, social interaction, days lost from work, etc. Patient reports being able to more fully participate in social and family activities and responsibilities as headache symptoms have improved.      PHYSICAL EXAM  VS: /74 (BP Location: Left arm, Patient Position: Sitting, Cuff Size: Adult Regular)   Pulse 104    GEN: Pleasant and cooperative, in no acute distress  HEENT: No facial asymmetry    ALLERGIES  Allergies   Allergen Reactions    Amoxil [Penicillins] Rash     Dad unsure of reaction.    Bee Venom Anaphylaxis    Bioflavonoids Anaphylaxis    Citrus Anaphylaxis     All Blades    Contrast Dye Rash     [place] [facility] [place] Contrast [place] [facility] [place]  NOTE: this is a contrast media oral with iodine. Premedicate with methylpred standard for IV contrast, request barium contrast for oral contrast.    Iodinated Contrast Media Hives and Rash     Contrast [facility] [place] Contrast [facility] [place].  NOTE: this is a contrast media oral with iodine. Premedicate with methylpred standard for IV contrast, request barium contrast for oral contrast.    Pineapple Anaphylaxis, Difficulty breathing and Rash    Reglan [Metoclopramide] Other (See Comments)     IV dose only, in ER, rapid heart rate.    Ace Inhibitors      Difficulty in breathing and GI upset    Amitiza [Lubiprostone] Nausea and Vomiting    Amoxicillin-Pot Clavulanate     Midazolam Unknown     parent states that when pt takes this medication, she wakes up being very violent .    Midazolam Hcl      Coming out of pelvic exam at age of 5, was kicking and screaming when coming out of the versed.    Other [No Clinical Screening - See Comments]      Bleech/ chest tightness,  itchy throat, swollen tongue, hives    Tizanidine Other (See Comments)     Confusion, back pain, photophobia, abdominal pain, shaking, anxious      Adhesive Tape Rash    Azithromycin Hives and Rash    Cephalexin Itching and Rash    Sulfa Antibiotics Rash     Skin scarring       CURRENT MEDICATIONS    Current Outpatient Medications:     albuterol (PROAIR HFA/PROVENTIL HFA/VENTOLIN HFA) 108 (90 Base) MCG/ACT inhaler, Inhale 2 puffs into the lungs every 6 hours as needed for shortness of breath / dyspnea or wheezing, Disp: 1 Inhaler, Rfl: 1    amitriptyline (ELAVIL) 25 MG tablet, TAKE ONE TABLET BY MOUTH AT BEDTIME, Disp: 90 tablet, Rfl: 0    apremilast (OTEZLA) 30 MG tablet, Take 1 tablet (30 mg) by mouth 2 times daily. Hold for signs of infection, and seek medical attention., Disp: 180 tablet, Rfl: 3    artificial tears OINT ophthalmic ointment, 0.5 inch strip each eye at night, Disp: 1 Tube, Rfl: 11    azaTHIOprine (IMURAN) 50 MG tablet, Take 1 tablet (50 mg) by mouth daily with food., Disp: 30 tablet, Rfl: 1    azelaic acid (FINACIA) 15 % external gel, Once daily to scars, upper chest and small portion of back and spot on belly button. Hold if irritating, Disp: 50 g, Rfl: 5    benzocaine (AMERICAINE) 20 % external aerosol, Apply to perineum four times daily as needed for pain, Disp: 57 g, Rfl: 3    benzocaine (TOPICALE XTRA) 20 % GEL, Apply 1 g to affected area 4 times daily as needed for mouth sores., Disp: 30 g, Rfl: 5    betamethasone valerate (VALISONE) 0.1 % cream, Apply topically 2 times daily as needed , Disp: , Rfl:     bisacodyl (DULCOLAX) 5 MG EC tablet, Take 2 tablets (10 mg) by mouth daily as needed for constipation, Disp: 30 tablet, Rfl: 0    citalopram (CELEXA) 20 MG tablet, Take 1 tablet (20 mg) by mouth daily, Disp: 90 tablet, Rfl: 1    EPINEPHrine (EPIPEN 2-EAMON) 0.3 MG/0.3ML injection, Inject 0.3 mLs (0.3 mg) into the muscle as needed for anaphylaxis, Disp: 0.6 mL, Rfl: 3    etonogestrel  (NEXPLANON) 68 MG IMPL, Inject 68 mg Subcutaneous, Disp: , Rfl:     fluocinonide (LIDEX) 0.05 % external gel, Apply topically 2 times daily., Disp: 60 g, Rfl: 11    gabapentin (NEURONTIN) 300 MG capsule, Take 1 capsule (300 mg) by mouth every morning, Disp: 60 capsule, Rfl: 1    hydroquinone (ARACELY) 4 % external cream, Apply once daily for 3 months. Stop for one month. If further lightening is desired, proceed with one additional month of treatment., Disp: 28 g, Rfl: 1    hydrOXYzine HCl (ATARAX) 25 MG tablet, TAKE ONE OR TWO TABLETS BY MOUTH EVERY SIX HOURS AS NEEDED, Disp: , Rfl:     hypromellose (GENTEAL) 0.3 % SOLN, 1 drop every hour as needed for dry eyes , Disp: , Rfl:     lactulose 20 GM/30ML SOLN, Take 30 mLs by mouth 3 times daily as needed (for constipation), Disp: 300 mL, Rfl: 3    lanolin ointment, Apply topically every hour as needed for other (sore nipples), Disp: 7 g, Rfl: 3    lidocaine (LMX4) 4 % external cream, Apply topically once as needed for mild pain, Disp: 120 g, Rfl: 1    lifitegrast (XIIDRA) 5 % opthalmic solution, Place 1 drop into both eyes 2 times daily., Disp: 60 each, Rfl: 5    LINZESS 290 MCG capsule, TAKE 1 CAPSULE BY MOUTH EVERY DAY IN THE MORNING BEFORE BREAKFAST, Disp: 90 capsule, Rfl: 0    mometasone (ELOCON) 0.1 % external ointment, Apply topically 2 times daily., Disp: 45 g, Rfl: 11    ondansetron (ZOFRAN ODT) 8 MG ODT tab, Take 1 tablet (8 mg) by mouth every 8 hours as needed, Disp: 90 tablet, Rfl: 3    polyethylene glycol (MIRALAX/GLYCOLAX) powder, Take 1 capful by mouth 3 times daily, Disp: , Rfl:     Prenatal MV-Min-Fe Fum-FA-DHA (PRENATAL 1 PO), , Disp: , Rfl:     rizatriptan (MAXALT-MLT) 5 MG ODT, DISSOLVE 1 OR 2 TABLETS IN THE MOUTH AS NEEDED FOR HEADACHE AT ONSET OF MIGRAINE, MAY REPEAT IN 2 HOURS AS NEEDED. MAX 30MG IN 24 HOURS AND MAX 5 DAYS PER MONTH, Disp: , Rfl:     Sharps Container MISC, Use for methotreaxte shots, Disp: 1 each, Rfl: 11    sodium fluoride 1.1 %  CREA, Apply 1 Application topically daily, Disp: , Rfl:     sucralfate (CARAFATE) 1 GM/10ML suspension, Take 10 mLs (1 g) by mouth 4 times daily, Disp: 1200 mL, Rfl: 2    triamcinolone (KENALOG) 0.1 % external ointment, Apply twice daily as needed to lesions on the genitals and body. OK to use very sparingly on the face., Disp: 454 g, Rfl: 2    White Petrolatum-Mineral Oil (ARTIFICIAL TEARS) 83-15 % OINT, Apply 1 g to eye at bedtime. Apply to each eye at night at bedtime., Disp: 3.5 g, Rfl: 11    Current Facility-Administered Medications:     botulinum toxin type A (BOTOX) 100 units injection 200 Units, 200 Units, Intramuscular, Q90 Days, Alejandrina Hernandez MD    botulinum toxin type A (BOTOX) 100 units injection 200 Units, 200 Units, Intramuscular, Q90 Days, Alejandrina Hernandez MD, 200 Units at 24 1447       BOTULINUM NEUROTOXIN INJECTION PROCEDURES    VERIFICATION OF PATIENT IDENTIFICATION AND PROCEDURE     Initials   Patient Name SES   Patient  SES   Procedure Verified by: SES     Prior to the start of the procedure and with procedural staff participation, I verbally confirmed the patient s identity using two indicators, relevant allergies, that the procedure was appropriate and matched the consent or emergent situation, and that the correct equipment/implants were available. Immediately prior to starting the procedure I conducted the Time Out with the procedural staff and re-confirmed the patient s name, procedure, and site/side. (The Joint Commission universal protocol was followed.)  Yes    Sedation (Moderate or Deep): None    ABOVE ASSESSMENTS PERFORMED BY    Alejandrina Hernandez MD    INDICATIONS FOR PROCEDURES  Samara Oropeza is a 29 year old patient with pain secondary to the diagnosis of chronic migraine headaches. Her baseline symptoms have been recalcitrant to oral medications and conservative therapy.  She is here today for reinjection with Botox.    GOAL OF PROCEDURE  The goal  of this procedure is to decrease pain.      TOTAL DOSE ADMINISTERED  Dose Administered:  200 units  Botox (Botulinum Toxin Type A)       2:1 Dilution   Unavoidable Drug Waste: No.  Diluent Used:  Preservative Free Normal Saline  Total Volume of Diluent Used:  4 ml  NDC #: Botox 100u ([address]-07)      CONSENT  The risks, benefits, and treatment options were discussed with Samara Oropeza and she agreed to proceed.    Written consent was obtained by  [facility] .     EQUIPMENT USED  Needle-25mm stimulating/recording  Needle-30 gauge  EMG/NCS Machine    SKIN PREPARATION  Skin preparation was performed using an alcohol wipe.    GUIDANCE DESCRIPTION  Electro-myographic guidance was necessary throughout the neck portion of the procedure to accurately identify all areas of spastic muscles while avoiding injection of non-spastic muscles, neighboring nerves and nearby vascular structures.     AREA/MUSCLE INJECTED:  200 UNITS BOTOX = TOTAL DOSE, 2:1 DILUTION     1. SHOULDER GIRDLE & NECK MUSCLES: 30 UNITS BOTOX = TOTAL DOSE    Right Levator Scapulae - 5 units of Botox over 2 site/s.   Left Levator Scapulae - 5 units of Botox over 1 site/s.    Right Sternocleidomastoid - 2.5 units of Botox over 1 site/s.   Left Sternocleidomastoid - 2.5 units of Botox over 1 site/s.     Right Anterior Scalene - 5 units of Botox over 1 site/s    Right Rhomboid Major - 2.5 units of Botox over 1 site/s.    Left Rhomboid Major - 2.5 units of Botox over 1 site/s.      Right Pectoralis Minor - 2.5 units of Botox over 1 site/s.    Left Pectoralis Minor - 2.5 units of Botox over 1 site/s.     2. FACIAL & SCALP MUSCLES: 170 UNITS BOTOX = TOTAL DOSE     Right Occipitalis - 20 units of Botox over 2 site/s.   Left Occipitalis - 20 units of Botox over 2 site/s.     Right Frontalis - 10 units of Botox over 2 site/s.  Left Frontalis - 10 units of Botox over 2 site/s.     Right Temporalis - 50 units of Botox over 8 site/s.  Left Temporalis - 50 units of Botox  over 8 site/s.     Right  - 2.5 units of Botox over 1 site/s.              Left  - 2.5 units of Botox over 1 site/s.                 Procerus - 5 units of Botox at 1 site/s.      BILATERAL GREATER & LESSER OCCIPITAL NERVE BLOCKS, TRIGGER POINT INJECTIONS  1% lidocaine: 2 ml  0.5% bupivacaine: 10 ml  [place] ml = 40 mg    ONB: Area just inferior to insertion of the right and left superior trapezius insertion onto skull was cleansed with ChloraPrep. Needle was advanced anteriorly to base of skull then slightly withdrawn and injectate was injected in a fan-like distribution at different depths. An 8 ml mixture of 1% lidocaine, and methylprednisolone was divided into two 5 ml syringes. Total injection volume = 4 ml per side.     TPI: Areas of the skin were prepped with ChloraPrep.  Using standard precautions, a 30-gauge 1-inch needle was used to inject a 3 ml mixture of 1% lidocaine and 0.5% bupivacaine into the upper trapezius and rhomboid major/minor muscles bilaterally for a total of 8 sites.         RESPONSE TO PROCEDURE  Samara Oropeza tolerated the procedure well and there were no immediate complications. She was allowed to recover for an appropriate period of time and was discharged home in stable condition.      ASSESSMENT AND PLAN   Botulinum toxin injections: Added a R anterior scalene injection but otherwise left botulinum injection pattern unchanged. Occipital nerve blocks and trigger point injections also administered per patient request to address occipital neuralgia and myofascial pain. Patient will continue to monitor response to Botox and report at next appointment.   Referrals: None.   Medications: No changes.   Follow up: Samara Oropeza was rescheduled for the next series of injections in 12 weeks, at which time we will evaluate response to today's injections. she may call the clinic prior with any questions or concerns prior to the next appointment.     This patient  was seen with PM&R Physician, Dr Hernandez, who agrees with the above plan    Carolina Baltazar MD  Movement Disorder Fellow

## 2025-02-04 ENCOUNTER — OFFICE VISIT (OUTPATIENT)
Dept: PHYSICAL MEDICINE AND REHAB | Facility: CLINIC | Age: 30
End: 2025-02-04
Payer: COMMERCIAL

## 2025-02-04 VITALS — HEART RATE: 104 BPM | DIASTOLIC BLOOD PRESSURE: 74 MMHG | SYSTOLIC BLOOD PRESSURE: 110 MMHG

## 2025-02-04 DIAGNOSIS — M54.81 BILATERAL OCCIPITAL NEURALGIA: ICD-10-CM

## 2025-02-04 DIAGNOSIS — G43.719 CHRONIC MIGRAINE WITHOUT AURA, INTRACTABLE, WITHOUT STATUS MIGRAINOSUS: Primary | ICD-10-CM

## 2025-02-04 DIAGNOSIS — M79.18 MYOFASCIAL PAIN: ICD-10-CM

## 2025-02-04 NOTE — LETTER
2/4/2025      Samara Oropeza  [address]  [place] [facility] [place] [address]      Dear Colleague,    Thank you for referring your patient, Samara Oropeza, to the [facility]. Please see a copy of my visit note below.      [facility]    PM&R CLINIC NOTE  BOTULINUM TOXIN PROCEDURE      CC: Chronic Migraine Headaches    HPI  aSmara Oropeza is a 29 year old female who presents to clinic for botulinum toxin injections for management of chronic migraine headaches.     SINCE LAST VISIT  Samara Oropeza was last seen here in clinic on 11/12/2024, at which time she received 200 units of Botox as well as a nerve block in the greater and lesser occipital nerves    Patient denies new medical diagnoses, illnesses, hospitalizations, emergency room visits, and injuries since the previous injection with botulinum neurotoxin. 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 She is then set to start Methotrexate subcutaneous injections once she is no longer breastfeeding    RESPONSE TO PREVIOUS TREATMENT    Side effects: Issues lifting her head off of a pillow for about a month. This has happened with almost every injection.     1.  Headache frequency during this injection cycle: She reports ongoing daily migraine before and after the injections and has a migraine today. This is compared to her baseline headache frequency of 30 headache days per month.     2.  Headache duration during this injection cycle:  Headache duration was usually a few hours to a few days . Patient reports a few episodes of multiple day headaches during this injection cycle, particularly as the Botox was wearing off.     3.  Headache intensity during this injection cycle:    A.  7-8/10  =  Typical pain level.  B.  8/10  =  Worst pain level - this has only occurred  over the last two weeks as Botox has been wearing off.   C.  2/10  =  Lowest pain level.    4.  Change in headache medication usage during this injection cycle:  (For Example:  Able to decrease use of oral pain medications.) She has been taking Tylenol and ibuprofen as needed for her migraine headaches. She will take rizatriptan if the OTC medications do not work. For prevention, she uses Amitripyline     5.  ER Visits During This Injection Cycle:  None.     6.  Functional Performance:  Change in ADL's, social interaction, days lost from work, etc. Patient reports being able to more fully participate in social and family activities and responsibilities as headache symptoms have improved.      PHYSICAL EXAM  VS: /74 (BP Location: Left arm, Patient Position: Sitting, Cuff Size: Adult Regular)   Pulse 104    GEN: Pleasant and cooperative, in no acute distress  HEENT: No facial asymmetry    ALLERGIES  Allergies   Allergen Reactions     Amoxil [Penicillins] Rash     Dad unsure of reaction.     Bee Venom Anaphylaxis     Bioflavonoids Anaphylaxis     Citrus Anaphylaxis     All [place]     Contrast Dye Rash     Contrast Media Ready-Box [facility], 04/09/2014.; Contrast Media Ready-Box [facility], 04/09/2014.  NOTE: this is a contrast media oral with iodine. Premedicate with methylpred standard for IV contrast, request barium contrast for oral contrast.     Iodinated Contrast Media Hives and Rash     Contrast Media Ready-Box [facility], 04/09/2014.; Contrast Media Ready-Box [facility], 04/09/2014.  NOTE: this is a contrast media oral with iodine. Premedicate with methylpred standard for IV contrast, request barium contrast for oral contrast.     Pineapple Anaphylaxis, Difficulty breathing and Rash     Reglan [Metoclopramide] Other (See Comments)     IV dose only, in ER, rapid heart rate.     Ace Inhibitors      Difficulty in breathing and GI upset     Amitiza [Lubiprostone] Nausea and Vomiting     Amoxicillin-Pot Clavulanate      Midazolam  Unknown     parent states that when pt takes this medication, she wakes up being very violent .     Midazolam Hcl      Coming out of pelvic exam at age of 5, was kicking and screaming when coming out of the versed.     Other [No Clinical Screening - See Comments]      Bleech/ chest tightness, itchy throat, swollen tongue, hives     Tizanidine Other (See Comments)     Confusion, back pain, photophobia, abdominal pain, shaking, anxious       Adhesive Tape Rash     Azithromycin Hives and Rash     Cephalexin Itching and Rash     Sulfa Antibiotics Rash     Skin scarring       CURRENT MEDICATIONS    Current Outpatient Medications:      albuterol (PROAIR HFA/PROVENTIL HFA/VENTOLIN HFA) 108 (90 Base) MCG/ACT inhaler, Inhale 2 puffs into the lungs every 6 hours as needed for shortness of breath / dyspnea or wheezing, Disp: 1 Inhaler, Rfl: 1     amitriptyline (ELAVIL) 25 MG tablet, TAKE ONE TABLET BY MOUTH AT BEDTIME, Disp: 90 tablet, Rfl: 0     apremilast (OTEZLA) 30 MG tablet, Take 1 tablet (30 mg) by mouth 2 times daily. Hold for signs of infection, and seek medical attention., Disp: 180 tablet, Rfl: 3     artificial tears OINT ophthalmic ointment, 0.5 inch strip each eye at night, Disp: 1 Tube, Rfl: 11     azaTHIOprine (IMURAN) 50 MG tablet, Take 1 tablet (50 mg) by mouth daily with food., Disp: 30 tablet, Rfl: 1     azelaic acid (FINACIA) 15 % external gel, Once daily to scars, upper chest and small portion of back and spot on belly button. Hold if irritating, Disp: 50 g, Rfl: 5     benzocaine (AMERICAINE) 20 % external aerosol, Apply to perineum four times daily as needed for pain, Disp: 57 g, Rfl: 3     benzocaine (TOPICALE XTRA) 20 % GEL, Apply 1 g to affected area 4 times daily as needed for mouth sores., Disp: 30 g, Rfl: 5     betamethasone valerate (VALISONE) 0.1 % cream, Apply topically 2 times daily as needed , Disp: , Rfl:      bisacodyl (DULCOLAX) 5 MG EC tablet, Take 2 tablets (10 mg) by mouth daily as needed  for constipation, Disp: 30 tablet, Rfl: 0     citalopram (CELEXA) 20 MG tablet, Take 1 tablet (20 mg) by mouth daily, Disp: 90 tablet, Rfl: 1     EPINEPHrine (EPIPEN 2-EAMON) 0.3 MG/0.3ML injection, Inject 0.3 mLs (0.3 mg) into the muscle as needed for anaphylaxis, Disp: 0.6 mL, Rfl: 3     etonogestrel (NEXPLANON) 68 MG IMPL, Inject 68 mg Subcutaneous, Disp: , Rfl:      fluocinonide (LIDEX) 0.05 % external gel, Apply topically 2 times daily., Disp: 60 g, Rfl: 11     gabapentin (NEURONTIN) 300 MG capsule, Take 1 capsule (300 mg) by mouth every morning, Disp: 60 capsule, Rfl: 1     hydroquinone (ARACELY) 4 % external cream, Apply once daily for 3 months. Stop for one month. If further lightening is desired, proceed with one additional month of treatment., Disp: 28 g, Rfl: 1     hydrOXYzine HCl (ATARAX) 25 MG tablet, TAKE ONE OR TWO TABLETS BY MOUTH EVERY SIX HOURS AS NEEDED, Disp: , Rfl:      hypromellose (GENTEAL) 0.3 % SOLN, 1 drop every hour as needed for dry eyes , Disp: , Rfl:      lactulose 20 GM/30ML SOLN, Take 30 mLs by mouth 3 times daily as needed (for constipation), Disp: 300 mL, Rfl: 3     lanolin ointment, Apply topically every hour as needed for other (sore nipples), Disp: 7 g, Rfl: 3     lidocaine (LMX4) 4 % external cream, Apply topically once as needed for mild pain, Disp: 120 g, Rfl: 1     lifitegrast (XIIDRA) 5 % opthalmic solution, Place 1 drop into both eyes 2 times daily., Disp: 60 each, Rfl: 5     LINZESS 290 MCG capsule, TAKE 1 CAPSULE BY MOUTH EVERY DAY IN THE MORNING BEFORE BREAKFAST, Disp: 90 capsule, Rfl: 0     mometasone (ELOCON) 0.1 % external ointment, Apply topically 2 times daily., Disp: 45 g, Rfl: 11     ondansetron (ZOFRAN ODT) 8 MG ODT tab, Take 1 tablet (8 mg) by mouth every 8 hours as needed, Disp: 90 tablet, Rfl: 3     polyethylene glycol (MIRALAX/GLYCOLAX) powder, Take 1 capful by mouth 3 times daily, Disp: , Rfl:      Prenatal MV-Min-Fe Fum-FA-DHA (PRENATAL 1 PO), , Disp: , Rfl:       rizatriptan (MAXALT-MLT) 5 MG ODT, DISSOLVE 1 OR 2 TABLETS IN THE MOUTH AS NEEDED FOR HEADACHE AT ONSET OF MIGRAINE, MAY REPEAT IN 2 HOURS AS NEEDED. MAX 30MG IN 24 HOURS AND MAX 5 DAYS PER MONTH, Disp: , Rfl:      Sharps Container MISC, Use for methotreaxte shots, Disp: 1 each, Rfl: 11     sodium fluoride 1.1 % CREA, Apply 1 Application topically daily, Disp: , Rfl:      sucralfate (CARAFATE) 1 GM/10ML suspension, Take 10 mLs (1 g) by mouth 4 times daily, Disp: 1200 mL, Rfl: 2     triamcinolone (KENALOG) 0.1 % external ointment, Apply twice daily as needed to lesions on the genitals and body. OK to use very sparingly on the face., Disp: 454 g, Rfl: 2     White Petrolatum-Mineral Oil (ARTIFICIAL TEARS) 83-15 % OINT, Apply 1 g to eye at bedtime. Apply to each eye at night at bedtime., Disp: 3.5 g, Rfl: 11    Current Facility-Administered Medications:      botulinum toxin type A (BOTOX) 100 units injection 200 Units, 200 Units, Intramuscular, Q90 Days, Alejandrina Hernandez MD     botulinum toxin type A (BOTOX) 100 units injection 200 Units, 200 Units, Intramuscular, Q90 Days, Alejandrina Hernandez MD, 200 Units at 24 1447       BOTULINUM NEUROTOXIN INJECTION PROCEDURES    VERIFICATION OF PATIENT IDENTIFICATION AND PROCEDURE     Initials   Patient Name SES   Patient  SES   Procedure Verified by: DEYSI     Prior to the start of the procedure and with procedural staff participation, I verbally confirmed the patient s identity using two indicators, relevant allergies, that the procedure was appropriate and matched the consent or emergent situation, and that the correct equipment/implants were available. Immediately prior to starting the procedure I conducted the Time Out with the procedural staff and re-confirmed the patient s name, procedure, and site/side. (The Joint Commission universal protocol was followed.)  Yes    Sedation (Moderate or Deep): None    ABOVE ASSESSMENTS PERFORMED BY    Alejandrina  MD Mary    INDICATIONS FOR PROCEDURES  Samara Oropeza is a 29 year old patient with pain secondary to the diagnosis of chronic migraine headaches. Her baseline symptoms have been recalcitrant to oral medications and conservative therapy.  She is here today for reinjection with Botox.    GOAL OF PROCEDURE  The goal of this procedure is to decrease pain.      TOTAL DOSE ADMINISTERED  Dose Administered:  200 units  Botox (Botulinum Toxin Type A)       2:1 Dilution   Unavoidable Drug Waste: No.  Diluent Used:  Preservative Free Normal Saline  Total Volume of Diluent Used:  4 ml  NDC #: Botox 100u ([address]-38)      CONSENT  The risks, benefits, and treatment options were discussed with Samara Oropeza and she agreed to proceed.    Written consent was obtained by  [facility] .     EQUIPMENT USED  Needle-25mm stimulating/recording  Needle-30 gauge  EMG/NCS Machine    SKIN PREPARATION  Skin preparation was performed using an alcohol wipe.    GUIDANCE DESCRIPTION  Electro-myographic guidance was necessary throughout the neck portion of the procedure to accurately identify all areas of spastic muscles while avoiding injection of non-spastic muscles, neighboring nerves and nearby vascular structures.     AREA/MUSCLE INJECTED:  200 UNITS BOTOX = TOTAL DOSE, 2:1 DILUTION     1. SHOULDER GIRDLE & NECK MUSCLES: 30 UNITS BOTOX = TOTAL DOSE    Right Levator Scapulae - 5 units of Botox over 2 site/s.   Left Levator Scapulae - 5 units of Botox over 1 site/s.    Right Sternocleidomastoid - 2.5 units of Botox over 1 site/s.   Left Sternocleidomastoid - 2.5 units of Botox over 1 site/s.     Right Anterior Scalene - 5 units of Botox over 1 site/s    Right Rhomboid Major - 2.5 units of Botox over 1 site/s.    Left Rhomboid Major - 2.5 units of Botox over 1 site/s.      Right Pectoralis Minor - 2.5 units of Botox over 1 site/s.    Left Pectoralis Minor - 2.5 units of Botox over 1 site/s.     2. FACIAL & SCALP MUSCLES: 170 UNITS  BOTOX = TOTAL DOSE     Right Occipitalis - 20 units of Botox over 2 site/s.   Left Occipitalis - 20 units of Botox over 2 site/s.     Right Frontalis - 10 units of Botox over 2 site/s.  Left Frontalis - 10 units of Botox over 2 site/s.     Right Temporalis - 50 units of Botox over 8 site/s.  Left Temporalis - 50 units of Botox over 8 site/s.     Right  - 2.5 units of Botox over 1 site/s.              Left  - 2.5 units of Botox over 1 site/s.                 Procerus - 5 units of Botox at 1 site/s.      BILATERAL GREATER & LESSER OCCIPITAL NERVE BLOCKS, TRIGGER POINT INJECTIONS  1% lidocaine: 2 ml  0.5% bupivacaine: 10 ml  Kenalo ml = 40 mg    ONB: Area just inferior to insertion of the right and left superior trapezius insertion onto skull was cleansed with ChloraPrep. Needle was advanced anteriorly to base of skull then slightly withdrawn and injectate was injected in a fan-like distribution at different depths. An 8 ml mixture of 1% lidocaine, and methylprednisolone was divided into two 5 ml syringes. Total injection volume = 4 ml per side.     TPI: Areas of the skin were prepped with ChloraPrep.  Using standard precautions, a 30-gauge 1-inch needle was used to inject a 3 ml mixture of 1% lidocaine and 0.5% bupivacaine into the upper trapezius and rhomboid major/minor muscles bilaterally for a total of 8 sites.         RESPONSE TO PROCEDURE  Samara Oropeza tolerated the procedure well and there were no immediate complications. She was allowed to recover for an appropriate period of time and was discharged home in stable condition.      ASSESSMENT AND PLAN   Botulinum toxin injections: Added a R anterior scalene injection but otherwise left botulinum injection pattern unchanged. Occipital nerve blocks and trigger point injections also administered per patient request to address occipital neuralgia and myofascial pain. Patient will continue to monitor response to Botox and report at next  appointment.   Referrals: None.   Medications: No changes.   Follow up: Samara Oropeza was rescheduled for the next series of injections in 12 weeks, at which time we will evaluate response to today's injections. she may call the clinic prior with any questions or concerns prior to the next appointment.     This patient was seen with PM&R Physician, Dr Hernandez, who agrees with the above plan    Carolina Baltazar MD  Movement Disorder Fellow      Again, thank you for allowing me to participate in the care of your patient.        Sincerely,        Alejandrina Hernandez MD    Electronically signed

## 2025-02-16 RX ORDER — TRIAMCINOLONE ACETONIDE 40 MG/ML
40 INJECTION, SUSPENSION INTRA-ARTICULAR; INTRAMUSCULAR ONCE
Status: ACTIVE | OUTPATIENT
Start: 2025-02-16

## 2025-02-16 RX ORDER — BUPIVACAINE HYDROCHLORIDE 5 MG/ML
10 INJECTION, SOLUTION PERINEURAL ONCE
Status: ACTIVE | OUTPATIENT
Start: 2025-02-16

## 2025-02-24 ENCOUNTER — MYC MEDICAL ADVICE (OUTPATIENT)
Dept: RHEUMATOLOGY | Facility: CLINIC | Age: 30
End: 2025-02-24
Payer: COMMERCIAL

## 2025-02-24 NOTE — TELEPHONE ENCOUNTER
"Called and spoke with patient. She confirms having chest tightness and \"skipping beat\" feeling that does fully resolve until the next day after taking azathioprine (Imuran) (she takes it at night). She has recently held her imuran dose and symptoms resolved. She denies any other new medications. No other new symptoms.     Per Dr. Sosa on 1/20/25: The plan is to try imuran 1st, if she fails, to switch to remicade.         Plan:  -she is due for labs and will get these done  -advised to continue holding imuran at this time  -sending to [facility] for further assistance      Kori Carrillo RN  Adult Rheumatology Clinic    "

## 2025-02-24 NOTE — TELEPHONE ENCOUNTER
Dominga Sosa MD  You; Natalie Leach, RPH1 hour ago (2:03 PM)     Agree with stopping imuran, also doing imuran monitoring labs.      Natalie Leach, [facility]  You; Dominga Sosa MD4 hours ago (11:03 AM)     I can't find any listed side effects of chest tightness or abnormal heartbeat/arhythmia for azathioprine. That being said if the symptoms resolved after being off the medication it is likely best to switch to Remicade and avoid azathioprine moving forward.       Patient updated via [employer].         Kori Carrillo RN  Adult Rheumatology Clinic

## 2025-02-26 NOTE — TELEPHONE ENCOUNTER
Patient has not read mychart or done labs. Voicemail does not identify patient, left generic message with call back number.        Kori Carrillo RN  Adult Rheumatology Clinic

## 2025-03-04 ENCOUNTER — LAB (OUTPATIENT)
Dept: LAB | Facility: CLINIC | Age: 30
End: 2025-03-04
Payer: COMMERCIAL

## 2025-03-04 DIAGNOSIS — Z51.81 MEDICATION MONITORING ENCOUNTER: ICD-10-CM

## 2025-03-04 DIAGNOSIS — M19.90 INFLAMMATORY ARTHRITIS: ICD-10-CM

## 2025-03-04 DIAGNOSIS — M35.2 BEHCET'S DISEASE (H): ICD-10-CM

## 2025-03-04 LAB
BASOPHILS # BLD AUTO: 0 10E3/UL (ref 0–0.2)
BASOPHILS NFR BLD AUTO: 0 %
EOSINOPHIL # BLD AUTO: 0.1 10E3/UL (ref 0–0.7)
EOSINOPHIL NFR BLD AUTO: 2 %
ERYTHROCYTE [DISTWIDTH] IN BLOOD BY AUTOMATED COUNT: 12.1 % (ref 10–15)
ERYTHROCYTE [SEDIMENTATION RATE] IN BLOOD BY WESTERGREN METHOD: 5 MM/HR (ref 0–20)
HCT VFR BLD AUTO: 41.9 % (ref 35–47)
HGB BLD-MCNC: 14 G/DL (ref 11.7–15.7)
IMM GRANULOCYTES # BLD: 0 10E3/UL
IMM GRANULOCYTES NFR BLD: 0 %
LYMPHOCYTES # BLD AUTO: 1.1 10E3/UL (ref 0.8–5.3)
LYMPHOCYTES NFR BLD AUTO: 28 %
MCH RBC QN AUTO: 30.4 PG (ref 26.5–33)
MCHC RBC AUTO-ENTMCNC: 33.4 G/DL (ref 31.5–36.5)
MCV RBC AUTO: 91 FL (ref 78–100)
MONOCYTES # BLD AUTO: 0.2 10E3/UL (ref 0–1.3)
MONOCYTES NFR BLD AUTO: 6 %
NEUTROPHILS # BLD AUTO: 2.4 10E3/UL (ref 1.6–8.3)
NEUTROPHILS NFR BLD AUTO: 64 %
PLATELET # BLD AUTO: 193 10E3/UL (ref 150–450)
RBC # BLD AUTO: 4.6 10E6/UL (ref 3.8–5.2)
WBC # BLD AUTO: 3.8 10E3/UL (ref 4–11)

## 2025-03-05 LAB
ALBUMIN SERPL BCG-MCNC: 4.7 G/DL (ref 3.5–5.2)
ALT SERPL W P-5'-P-CCNC: 15 U/L (ref 0–50)
AST SERPL W P-5'-P-CCNC: 19 U/L (ref 0–45)
CREAT SERPL-MCNC: 0.68 MG/DL (ref 0.51–0.95)
CRP SERPL-MCNC: <3 MG/L
EGFRCR SERPLBLD CKD-EPI 2021: >90 ML/MIN/1.73M2

## 2025-03-11 ENCOUNTER — OFFICE VISIT (OUTPATIENT)
Dept: OPHTHALMOLOGY | Facility: CLINIC | Age: 30
End: 2025-03-11
Attending: OPHTHALMOLOGY
Payer: COMMERCIAL

## 2025-03-11 DIAGNOSIS — H04.123 DRY EYES: ICD-10-CM

## 2025-03-11 DIAGNOSIS — H25.043 POSTERIOR SUBCAPSULAR POLAR SENILE CATARACT OF BOTH EYES: Primary | ICD-10-CM

## 2025-03-11 DIAGNOSIS — M35.2 BEHCET'S DISEASE (H): ICD-10-CM

## 2025-03-11 DIAGNOSIS — H26.33 CATARACT OF BOTH EYES DUE TO DRUG: ICD-10-CM

## 2025-03-11 PROCEDURE — G0463 HOSPITAL OUTPT CLINIC VISIT: HCPCS | Performed by: OPHTHALMOLOGY

## 2025-03-11 PROCEDURE — 99214 OFFICE O/P EST MOD 30 MIN: CPT | Performed by: OPHTHALMOLOGY

## 2025-03-11 PROCEDURE — 76519 ECHO EXAM OF EYE: CPT | Performed by: OPHTHALMOLOGY

## 2025-03-11 PROCEDURE — 92025 CPTRIZED CORNEAL TOPOGRAPHY: CPT | Performed by: OPHTHALMOLOGY

## 2025-03-11 RX ORDER — MINERAL OIL AND PETROLATUM 150; 830 MG/G; MG/G
1 OINTMENT OPHTHALMIC AT BEDTIME
Qty: 3.5 G | Refills: 11 | Status: SHIPPED | OUTPATIENT
Start: 2025-03-11

## 2025-03-11 ASSESSMENT — VISUAL ACUITY
OD_BAT_HIGH: 20/30
METHOD: SNELLEN - LINEAR
CORRECTION_TYPE: GLASSES
OD_BAT_LOW: 20/20-1
OS_BAT_LOW: 20/30-1
OD_CC: 20/20
OS_BAT_MED: 20/40-1
OS_CC: 20/20
OS_BAT_HIGH: 20/50
OD_BAT_MED: 20/30

## 2025-03-11 ASSESSMENT — EXTERNAL EXAM - RIGHT EYE: OD_EXAM: NORMAL

## 2025-03-11 ASSESSMENT — EXTERNAL EXAM - LEFT EYE: OS_EXAM: NORMAL

## 2025-03-11 ASSESSMENT — PATIENT HEALTH QUESTIONNAIRE - PHQ9: SUM OF ALL RESPONSES TO PHQ QUESTIONS 1-9: 7

## 2025-03-11 ASSESSMENT — TONOMETRY
OD_IOP_MMHG: 17
IOP_METHOD: TONOPEN
OS_IOP_MMHG: 16

## 2025-03-11 ASSESSMENT — CUP TO DISC RATIO
OS_RATIO: 0.35
OD_RATIO: 0.35

## 2025-03-11 ASSESSMENT — SLIT LAMP EXAM - LIDS
COMMENTS: NORMAL
COMMENTS: NORMAL

## 2025-03-11 ASSESSMENT — CONF VISUAL FIELD: OS_INFERIOR_NASAL_RESTRICTION: 3

## 2025-03-11 NOTE — PROGRESS NOTES
HPI       Cataract Evaluation    In both eyes.  Associated symptoms include blurred vision, glare, haloes and double vision.  Onset was gradual.  Duration of 12 months.  Frequency is constant.  Since onset it is gradually worsening.  Affected activities include night driving.  Pain was noted as 0/10.             Comments    She states that her vision in each eye seems to have worsened over the past year.  Her blurred vision is especially bothersome at night or in dim lighting.    She is using:  artificial tear ointment at bedtime  Xiidra twice daily in each eye    YADIRA Mac 2:32 PM  March 11, 2025               Last edited by Annie Cross COT on 3/11/2025  2:32 PM.          Review of systems for the eyes was negative other than the pertinent positives/negatives listed in the HPI.      Assessment & Plan      Samara Oropeza is a 29 year old female with the following diagnoses:   1. Posterior subcapsular polar senile cataract of both eyes    2. Cataract of both eyes due to drug    3. Dry eyes    4. Behcet's disease (H)         Here for cataract evaluation.  Seen with Dr. Heath in acute clinic this January for dry eye   Using xiidra twice a day and artificial tear ointment at bedtime   Not using artificial tears during the day    Surface and irritation improving.  Still having glare at night  Wears glasses full time.  Soft contact lenses on weekends for Yoga    Risks, benefits and alternatives to cataract extraction/IOL implantation discussed  Wishes to monitor for now, recheck yearly   Trial of Rigid gas permeable lenses recommended.  Refer to Dr. Medeiros for eval  Continue xiidra twice a day both eyes   Continue artificial tear ointment at bedtime   Preservative free artificial tears as needed     Patient disposition:   Return for Dr. Medeiros for new contact lens eval.  Dilated fundus exam with Danitza 1 year         Attending Physician Attestation:  Complete documentation of historical and exam  elements from today's encounter can be found in the full encounter summary report (not reduplicated in this progress note).  I personally obtained the chief complaint(s) and history of present illness.  I confirmed and edited as necessary the review of systems, past medical/surgical history, family history, social history, and examination findings as documented by others; and I examined the patient myself.  I personally reviewed the relevant tests, images, and reports as documented above.  I formulated and edited as necessary the assessment and plan and discussed the findings and management plan with the patient and family. . - Christos Bosch MD

## 2025-03-11 NOTE — NURSING NOTE
Chief Complaints and History of Present Illnesses   Patient presents with    Cataract Evaluation     Chief Complaint(s) and History of Present Illness(es)       Cataract Evaluation              Laterality: both eyes    Associated symptoms: blurred vision, glare, haloes and double vision    Onset: gradual    Duration: 12 months    Frequency: constant    Course: gradually worsening    Activities affected: night driving    Pain scale: 0/10              Comments    She states that her vision in each eye seems to have worsened over the past year.  Her blurred vision is especially bothersome at night or in dim lighting.    She is using:  artificial tear ointment at bedtime  Xiidra twice daily in each eye    Annie Cross, COT 2:32 PM  March 11, 2025

## 2025-03-19 ENCOUNTER — VIRTUAL VISIT (OUTPATIENT)
Dept: RHEUMATOLOGY | Facility: CLINIC | Age: 30
End: 2025-03-19
Attending: INTERNAL MEDICINE
Payer: COMMERCIAL

## 2025-03-19 ENCOUNTER — MYC MEDICAL ADVICE (OUTPATIENT)
Dept: RHEUMATOLOGY | Facility: CLINIC | Age: 30
End: 2025-03-19
Payer: COMMERCIAL

## 2025-03-19 DIAGNOSIS — M19.90 INFLAMMATORY ARTHRITIS: Primary | ICD-10-CM

## 2025-03-19 DIAGNOSIS — M06.00 SERONEGATIVE RHEUMATOID ARTHRITIS (H): ICD-10-CM

## 2025-03-19 PROCEDURE — G2211 COMPLEX E/M VISIT ADD ON: HCPCS | Performed by: INTERNAL MEDICINE

## 2025-03-19 PROCEDURE — 98007 SYNCH AUDIO-VIDEO EST HI 40: CPT | Performed by: INTERNAL MEDICINE

## 2025-03-19 RX ORDER — PREDNISONE 5 MG/1
TABLET ORAL
Qty: 30 TABLET | Refills: 2 | Status: SHIPPED | OUTPATIENT
Start: 2025-03-19

## 2025-03-19 NOTE — TELEPHONE ENCOUNTER
Discussed with Dr. Sosa, scheduled patient at 2:30p today virtual. Patient called and verified appointment.      Kori TORRE RN  Adult Rheumatology Clinic

## 2025-03-19 NOTE — NURSING NOTE
Current patient location: [address] [place] [address]   [facility] [address]    Is the patient currently in the state of [place]? YES    Visit mode: VIDEO    If the visit is dropped, the patient can be reconnected by:VIDEO VISIT: Text to cell phone:   Telephone Information:   Mobile [phone]       Will anyone else be joining the visit? NO  (If patient encounters technical issues they should call [phone]    Are changes needed to the allergy or medication list? No    Are refills needed on medications prescribed by this physician? NO    Rooming Documentation:  Questionnaire(s) completed    Reason for visit: RECHECK    Gerri LAIRDF

## 2025-03-19 NOTE — PROGRESS NOTES
Virtual Visit Details    Type of service:  Video Visit     Joined the call at 3/19/2025, 2:33:41 pm.  Left the call at 3/19/2025, 2:47:40 pm  Originating Location (pt. Location): Home  Distant Location (provider location):  On-site  Platform used for Video Visit: [facility]      Office Visit Details    Rheumatology Clinic Return Visit Patient     Samara Oropeza MRN# [id number]   YOB: 1995 Age: 29 year old     Date of Visit: 2025   Last seen: 1/15/2025         Assessment & Plan    Assessment & Plan   Samara is a 29 year old  female (ADRIAN 21) with Behçet's disease (pathergy, oral/genital ulcers, polyarthralgia) who presents today for RECHECK      # Behçet's disease (pathergy, arthralgias, recurrent oral / genital ulcers)  # s/p delivery on 2023 with high-risk pregnancy, complicated by pre-eclampsia,  birth but healthy baby    10/2022:  Mrs. Oropeza arrives for follow-up of her Behçet's disease that is more active at present with recent decrease of her Otezla (60mg --> 30mg) recommended by MF. Previous discussion with her OB providers had been to decrease to the lowest effective dose, clearly 30mg is not effective as she describes worsened oral ulcers, genital ulcers, arthralgias, abdominal pain, and thrombophlebitis. Although there is not a great deal of evidence for Otezla safety in pregnancy, its mechanism of action would suggest it. Counterbalancing these concerns are what we understand of Behçet's in pregnancy which more often improves but in some cases (~29%) becomes worse, and can flare more often in the 1st trimester and post-lorena period.  She also describes some vision changes, which in the context of Behçet's is conerning. She has not seen Ophthalmology in many years; I will place a referral today.     2023: Stable today, on otezla 30 mg bid, breastfeeding, [facility] is ok with breastfeeding, discussed ACR reproductive guideline, it does not recommend otezla  during pregnancy and breastfeeding given lack of data; however Samara remained on it during pregnancy and now breastfeeding as stopping it leads to severe flare of her behcet and benefits of staying on it outweighs risks. Her M provider is aware of staying on otezla and is ok with it during breastfeeding.    9/27/23: Doing well, remains on otezla. Has had some interruptions in doses. Today has back and hand pain, and ankle swelling. Will start celebrex, ok with breastfeeding.     2/1/2024:    Behcet's oral/vaginal ulcers are under fair control on otezla, will continue. But she continues to have active inflammatory arthritis, she failed celebrex. Will add  mg bid; risks were discussed. HCQ is safe in pregnancy and breastfeeding in case of pregnancy in future . Will check labs.      6/5/2024:    Active inflammatory arthritis despite adding HCQ in 2/2024, HCQ causes SE, fingers are deforming. Recommend to add methotrexate; risks were discussed. Was informed that it is not allowed in pregnancy and breastfeeding. She will start after she is done with breastfeeding in 8/2024. She prefers sub q inj over oral to avoid GI SEs. Will continue otezla to control oral/genital ulcers. I reviewed labs, stable.    9/18/2024:    Continues to have active inflammatory arthritis, plan is to start methotrexate as soon as she is done with breastfeeding; risks were discussed.    Stopped HCQ in 6/2024 given lack of benefit.    Oral/genital ulcers due to Behcet's are under control on otezla.    New itching, skin rashes are ongoing. I will message her derm team as prescribe topical is not covered by insurance.    Plan:    When you stop breastfeeding, start methotrexate 0.4 ml weekly under skin injection, our pharmacist Natalie ([facility] referral) will call you to teach you how to do it    Start folic acid 1 mg a day to help with methotrexate side effects    Stay on otezla    Labs one month after start of methotrexate    Avoid pregnancy  "on methotrexate    Return in about 3-4 months in person    1/15/24:    Conversations today reveal alfredo is currently in the midst of a flare, with arthralgia of the hands, neck and ankles. She reports having the flares about once a month. This episode has been ongoing for \"3-4 days\". She also shared a recent diagnosis of cataracts, as well as the onset of blurry vision and floaters of both eyes which began with this flare. Samara is still weaning her child off of breast feeding, and has not been able to trial methotrexate. We discussed starting Imuran once daily and she was open to the suggestion. There was some concern about bruising following Imuran previously, however, she does not recall such a reaction.     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    In light of her eye findings, we also discussed scheduling an urgent ophthalmology visit to rule out uveitis.     Plan:  - Start Imuran 50 mg once daily; risks were discussed. Per ACR reproductive guidelines, it is safe in pregnancy and breastfeeding  - Continue Otezla  - Labs today including HepB/C serolgy as well as Quantiferon TB testing in case of using remicade in future, as well as follow labs in 4 weeks to monitor effects of Imuran  - Ophthalmology referral made        Plan:    Add imuran 1 tab a day to otezla    Labs today and in 4 weeks    Urgent eye clinic appointment    Return video visit in about 3-4 months      Today 3/19/2025:    Active arthritis, no uveitis or active rashes. Oral/genital ulcers fairly controlled on otezla. Did not tolerate AZA but joint pain improved. Can't do methotrexate, because she is breastfeeding. Remicade is also not the best option as we prescribe low dose methotrexate to prevent allergic reaction. Recommend cimzia as next best option; risks were discussed. Cimzia is safe in " pregnancy, breastfeeding.      Plan:    Try cimzia every 2 weeks    Prednisone taper as needed    Keep June appointment with me    TT 40 min was spent on date of the encounter doing chart review, history and exam, documentation and further activities as noted above. Any prior notes, outside records, laboratory results, and imaging studies were reviewed if relevant.    The longitudinal plan of care for the diagnosis(es)/condition(s) as documented were addressed during this visit. Due to the added complexity in care, I will continue to support Samara in the subsequent management and with ongoing continuity of care.      Disclosure: I earn consulting income from [employer], the company that manufactures Otezla. I am not involved directly in promoting otezla or doing research with otezla.    Dominga Sosa MD                 Medical History   History of Present Illness   Samara Oropeza is a 29 year old female who presents for follow-up of her Behçet's.      Today 3/19/2025:      -pain in hands is the major complaint, worse in AM, feels alexx    -was on AZA 50 mg every day, it helped with hand pain, had SE of palpitations and SOB    -no immediate pregnancy plan, still breastfeeding    -3 wk ago, had genital lesions, still gets skin lesions over chest, mouth is ok, still on otezla    -eyes are better on eye drops    -gets headaches, has h/o migraines, worse recently, dizziness today, gets botox inj      1/15/2025   - Currently experiencing a flare with arthralgia in her hands, neck, and ankles, ongoing for 3-4 days    - She also reports new vision and floaters both eyes that began this flare, and an Ophthalmology referral was placed as a result    - Currently weaning from breastfeeding and has not yet trialed methotrexate    - 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    9/18/2024:    -reports being in a flare    -hands are pretty painful, bent 5th fingers    -knuckles swell up in  AM and at night, reports 3 hours of AM stiffness    -almost done with breastfeeding, has not started the methotrexate yet    -reports severe itching over legs from knees down to legs, no rash, skin looks darker where she is itching, pretty swollen as well    -she never this kind of itching before, reports this started after back surgery    -had back surgery 4/30/2024, decompression of a nerve, it helped with nerve pain, still feels numb/tingly down in her legs    -gets sports over chest, breast, saw derm, was prescribed topical which is not covered by insurance. Next step would be laser tx    Seen Dr. Marilyn Moran Rheumatology in [place] [facility] [place] 10/14:    Original presentation 2014:     Ms Oropeza is a 19 y.o. female who presents with one year of presentation of painful oral ulcers (monthly) once that have worsened with two episodes in the last two months that presented with painful vulvar or vaginal ulcers (2 episodes so far every month) [not sure if they leave scar] as well that timing coincides with menses ([facility] [place]: 8/25/14).   ORAL ULCERS: last two episodes were severe, painful, white base with erythematous halo, that appear and disappear in the matter of 1-2 weeks without, does not bleed and doesn't respond to any treatment used (magic mouthwash), 10-15 in number with the biggest one being dime size.      VAGINAL ULCERS: 2-3 in number between labia majora and minora that occur simultaneously with oral ulcers, painful, non bleeding ulcers that are erythematous, no pus.      FOLLICULITIS: patient reports having spots in legs specially around ankle and knee, but arms, and neck are affected as well. Small lesions that resemble ingrown hair, most are red erythematous elevated lesions, well demarcated, some with liquid that patient refers as pimple like.      SKIN RASHES: welts and red macules with well defined borders that are pruritic and non-ulcerative on chest, arms and back. Benadryl relieves.      ARTHRALGIAS: In descending order  of severity: hips, knees, wrists, shoulders, neck, lumbar, fingers.   C/o morning stiffness in hips, back and neck lasting for about until noon.      Ms. Oropeza reports they present in severe flares and never fully resolve, but do get better. She has seen some swelling and warmth on the affected joints but has not noticed and redness. Has tried Tylenol, ibuprofen, and hydrocodone with not much benefit.      FEVERS: Reports 3-4 sporadic episodes per month of self limited fevers of 38 C that occur during the evenings and associate facial flushing.      HEADACHES: occur daily and are bitemporal and irradiate occipitally, pulsatile in nature, intensity varies from intense to mild, are worsened with movement. On treatment with ibuprofen and somatriptan without any positive results.      VISION CHANGES: Patient reports that suddenly she would only see a gray blotch in her right eyes and would persist like this for a day and a half. Also reports blurriness in her left eye. Presence of pain and burning sensation of eyeball with associated redness. Has changed prescription glasses in the matter of 2 years 3 times. She has had opthalmology exam and was not suggestive of any evidence of uveitis.   She was also evaluated in ED for a dizzy spell.      ABD PAIN W/ INTERMITTENT DIARRHEA AND CONSTIPATION: Patient reports abdominal pain that is intermittent, periods of 7 days without bowel movement and feeling bloated with change of pattern to diarrhea (liquidy without blood nor mucus, non foul smelling). Has taken Bentyl, Zegrid, and rinetidine with mild clinical improvement.  She also reports small red nodules after each needle stick for blood draw.   Neurology saw her back then and was not impressed with her presentation as physical examination was normal. Also MRI brain normal: Findings: No definite hemorrhage, mass affect, midline shift, or ventriculomegaly is noted.There are a few scattered non specific white matter T2  hyperintensities. Axial diffusion weighted images are unremarkable.     The major vascular structures appear patent. There is opacification and severe mucosal thickening in the right maxillary sinus. The remaining paranasal sinuses, and mastoid air cells are clear. Orbits are unremarkable  She has + HSV-1 IGG. Seen ID. Negative for Herpes simplex virus culture. HSV IGM I/II COMBINATION SENT TO [facility]  RESULTS = <0.91  REF RANGE: <0.91 = NEGATIVE  ID did not think HSV could explain her mouth and genital sores.      CRP within normal limits. HIV negative. CBC within normal limits; UA negative. Creatinine within normal limits   CYNDIE neg.  Antigliadin neg.   ANti TTG neg.   [facility] 2014: Colonoscopy and endoscopy neg; result not available. Not sure if biopsies were done.   Raynaud's phenomenon:  Onset: about 2013  Digits: all fingers  Digital ulcers: no  Color changes: white ---> purple and red  Duration of an attack: About couple of hours per mom  Pain during attack: not clear pain but bruise like feeling  Frequency of attacks: few times a month  Family history of Raynauds: no  GERD: very long time     No hemoptysis.   No miscarriages/ no thrombosis in past.   No family or personal history of psoriasis, ulcerative colitis or chron's disease.   No buttock pain or low back pain or stiffness in AM     Interim history:  Dec 2014- Multiple mouth ulcers and did not eat for 5 days. This coincided with periods. Colchicine 0.6mg PO BID started in Nov 2014.   Prednisone taper in Dec 20mg to 0 in 4 weeks. She also used magic mouthwash. Kenalog cream. After going to the ER, 3 days later her ulcers were better. She thinks it improved after the menstruation stopped.   LMP 3 weeks ago.   COLCHICINE HAS HELPED A LOT REDUCING THE INTENSITY OF MENSTRUATION ASSOCIATED ORAL AND VULVAR ULCERS.      Interim history: 6/15     Since last visit only two episodes of mouth sores- small sized 6   No genital ulcers since last visit.   Colchicine 1.2  "mg in AM and 0.6mg in PM keeps her symptoms under control.      Admitted in 5/15:  Admission Diagnoses:  - LUE weakness  - spell  - hx of migraines  - hx of Tourette syndrome  - hx of Behcet's disease     Discharge Diagnoses:   - spell likely 2/2 anxiety  - hx of migraines  - hx of Tourette syndrome  - hx of Behcet's disease     CT and MRI brain was normal.      Seeing Dr. Lilliana Miramontes soon as outpatient.      Dr. Garcia did not find any intraocular inflammation.       Interm 10/30/2015  ED for migraine. Continues to see neuro - MRI brain without lesions noted. Saw GI - upper barium normal. May be considering repeat colo. Worsening lesions in mouth and genitals. Has had continuous lesion in mouth x 2 months. 2 genital ulcers. Had fracture of right sesamoid in foot. Continues to have low grade fevers. New folliculitis on chest, legs and arms.      Interim hx: 1/11/2016    Pt states that since last visit she continues to have oral ulcers and has noted more folliculitis-like lesions on her lower extremities.  She states that on her right lower leg she had a red macular spot that has since resolved.  She denies uveitis.  She states that the colchicine initially helped but then stopped working.  She takes 0.6 mg TID.  She stopped taking her prednisone last week as she feels like this hasn't helped.  She hasn't had any episodes of vaginal ulcers.      She saw GI who stated she has gastroparesis.  She is seeing Cardiology later this week for possible syncopal episodes.       Interim history 5/16  Mouth ulcers X 1 last month  Genital ulcers - none since last visit.      Bilateral MCPs pain, knee pain and low back pain +ve increased since last visit  Morning stiffness X 2 hours (increasing)   5-6/10     \"overall myalgia\" has gotten worse    C/o pseudofolliculitis lesion on anterior shins bilaterally worse     Interim history: (7/18/2016)  Patient has just started Humira for 2 doses on 7/1 and 7/15 and reported minimal " improvement of her hip pain but otherwise, did not notice anything different.      She reported painful mouth ulcer once a month since last visit but noticed that it has been getting deeper.   Denied any genital ulcers.     Still has ongoing bilateral MCPs pain, knee pain but this has been intermittent and she did not have any much pain today. She reported 2-3 hours morning stiffness of both hands and pain score of 6/10 at her knees and 8/10 of her hands but currently takes no pain medication except prn ativan which she takes when her muscle is really tight.      The only concern is that she gained weight even though she has not been eating much (eat once a day) and do exercises every day for 1 hour (with video of yoga, pilates). Her mother wonders if she needs to have some labs work up include sex hormone, thyroid, cortisol.     Interval history 9/14/16  Patient was started Humira at the last visit, patient reports worsening of skin ulcers since starting Humira and stopped taking Humura for the past 2 weeks. Patient reports oral and genital ulcers has been stable even before starting Humira and has not had any interval oral/genital ulcers. However she reports recurrent skin ulcers occurring on a daily basis that affects her chest and anterior legs. Patient also has stopped taking prednisone, she reports that she doesn't feel the prednisone helps, her last dose of prednisone was 6+ months ago. Patient also report worsening low back pain that sometimes causes her to limp.     In the interval patient also visited ED on 8/31/16 for left hand pain and what the patient describes as phlebitis, no medication or procedure was done and the patient was discharged with a splint. Patient also reported 1 episode of chest pressure that was associated with dyspnea and numbness of the left arm, she was shopping in a supermarket at the time of onset, and the pressure resolved after she took a nap.     Patient denies any infectious  symptoms in the interval, no fever, chills, night sweat, cough, dysuria, denies eye pain or visual changes.     November 4, 2016  Oct - had one genital ulcer  Oral ulcers X 5- around menstruation time.   Knee pain- chronic on the left side  Dizziness spells - on and off; been to the ER for that in the past.   On and off migraines  July 2013 - s/p MPFL reconstruction plus LRL 7/2013  Morning stiffness in knee (left) X 1 hour     Severe jaw pain and chest pain- patient wondering if this is Tourette's or esophageal spasms. Cardiac workup negative.   Fever      January 13, 2017  Yesterday in ER [facility] [place] with orthostatic syncope from dehydration.   Chest pain on and off still continues. Painful with deep breaths.   Oral ulcers - few minor ones lasting for one week;   One major one in roof of mouth lasting for about one week.   Knee pain +ve - reviewed the recent MRI with her orthopedic surgeon.   On and off migraines  otezla is approved. Still waiting to receive the meds.      March 31, 2017  Otezla current dose 15mg PO BID.   She is tolerating okay at this dose and is willing to increase the dose further up to 30mg BID.   Her joint pains are better on otezla. Knee pain is also better.   Back and neck pain +ve 8/10 when it is worse. Intramuscular botox injections were given on Monday in PMR.   2 minor genital ulcers in the interim- resolved.   Few oral ulcers in the interim- resolved.   Nasal ulcer - resolved.   Migraines on and off.   Nausea with otezla but improving.   Dizziness and blackouts on and off. She fell once and hurt her ankle. Wearing boot in left leg.   Complete ROS negative except for above     May 17, 2017  Tolerating otezla better. Not throwing up anymore. Current dose 20mg in AM and 30mg in PM (2nd week).   Patient thinks that her oral ulcers frequency and severity are improving with otezla. Also no genital ulcers in the interim.   Currently on doxycycline for bronchitis/?pneunomia. 4 more days left.       Joint pain history  2 weeks ago/ dx with pneumonia 1 week ago/  Involved joints: all over  Pain scale: 6.5/10   Wakes the patient from sleep : Yes  Morning stiffness: Yes for 120 minutes  Meds used:otezla,colchicine     Interim history  Since last visit:  1. Infections - Yes/ pneumonia  2. New symptoms/medical problem - Yes/ thinks she may have had a mini-seizure about 10 days ago ; did not seek medical attention; did not reoccur after that. Patient seeing PCP today.  3. Any side effects from Rheum medications -vomiting a lot (resolved)  3. ER visits/Hospitalizations/surgeries - Yes  4. Last PCP visit: has an apt. today     Patient still getting double vision. Floaters present.      Therapist recommended psychiatry evaluation. Patient does not want to see psychaitry. Patient will see PCP today.      August 22, 2017  Have you ever seen a rheumatologist Yes Who You When 5/17/17     NO genital ulcers in the interim.   Mouth ulcers- three in the back of the throat and roof of the mouth last month.      Joint pain history  Onset: doing alittle worse / cut her otezla back to 30mg  Daily due to GI problems  Involved joints: all over her body. Been having more black out episodes, been having more chest pains.also has raised bumps on both legs from the knees down that itch and are painful   Pain scale:  5.5/10     Wakes the patient from sleep : Yes  Morning stiffness:Yes for 120 minutes  Meds used:otezla, colchicine (three pills daily)     Interim history  Since last visit:  1. Infections - Yes stomach issue  2. New symptoms/medical problem - No  3. Any side effects from Rheum medications -nausea from otezla  3. ER visits/Hospitalizations/surgeries - Yes, ER for foot, ankle injury from passing out and fell down the steps. Hit her head another time from passing out. Alcohol poisoning in July 4. Last PCP visit: 6/23/17 September 19, 2017  Have you ever seen a rheumatologist Yes Who You When 8/22/17  Joint pain  history  Onset: pt states that she hurts everywhere for 6 days  Involved joints: all joints  Pain scale:  7/10     Wakes the patient from sleep : Yes/ not sleeping at all  Morning stiffness:Yes for 180 minutes  Meds used:colchicine, otezla, magic mouth wash, lidocaine gel  Last one week: increased joint pain; and genital ulcer  Genital lesion (dimed size) in the last one week  After botox a week ago, she had fever 101 for three days; near syncopes. Back pain; floaters  Chest pain , mouth sores, hand pain, morning pain were all better with otezla 30mg twice daily.     Interim history  Since last visit:  1. Infections - No  2. New symptoms/medical problem - No  3. Any side effects from Rheum medications -nausea from the otezla  3. ER visits/Hospitalizations/surgeries - No  4. Last PCP visit: may 2017      October 18, 2017     Have you ever seen a rheumatologist Yes Who You When 9/19/17  Joint pain history  NO mouth or genital sores in the last 4 weeks.   Medrol dosepak helped with visual symptoms but not joint pains.      Onset: pt states that she is doing better than last time with her behcet's. Today has severe stomach pains, states that she is constipated. Pt had a seizure 2 days ago. Does have a metallic taste in her mouth  Involved joints: hands , neck, shoulders  Pain scale:  9/10 from stomach pain;      Wakes the patient from sleep : Yes  Morning stiffness:Yes for 120 minutes  Meds used:cochicine , otezla 30mg twice daily     Interim history  Since last visit:  1. Infections - No  2. New symptoms/medical problem - Yes/ the cartilage in her chest is inflamed  3. Any side effects from Rheum medications -nausea from the otezla  3. ER visits/Hospitalizations/surgeries - No  4. Last PCP visit: yesterday     January 16, 2018  Have you ever seen a rheumatologist Yes Who You When 10/18/17  Joint pain history  Onset: pt states that she was in the hospital for 5 days before maribell, they told her she had lesions in her  brain in the white matter. Had blood work drawn in the ER and they told her her inflammatory markers were elevated. Was seen in the ER last week for vomiting and diarrhea, not eating. Saw Gastro. On 1/10/18. 1.5 weeks ago she had an endoscopy and colonoscopy. She has been having elbow  And hands and knee pain, loosing use of her hands. Been dropping things. Also states that she has been getting lesions up her nose  Involved joints: see above  Pain scale:  8/10     Wakes the patient from sleep : Yes  Morning stiffness:Yes for 120 minutes  Meds used:colchisine, otezla, magic mouth wash, kenolog cream, lidocaine gel     Interim history  Since last visit:  1. Infections - Yes/ had an infection in her jaw. Having sinus issues  2. New symptoms/medical problem - Yes/ states that she is having problems walking, states that she was bouncing when she was walking  3. Any side effects from Rheum medications -otezla, nausea  3. ER visits/Hospitalizations/surgeries - Yes/ see chart  4. Last PCP visit: November 2017 April 17, 2018  Have you ever seen a rheumatologist Yes Who You When 1/16/18  Joint pain history  Onset: pt states that she is not doing well. She is having increased stomach issues, only eats 2 times in 4 days unable to keep the otezla down due to vomiting . Has sleep study done in 1 week; no genital ulcers since last appointment. 6 small mouth sores since last appointment.   Involved joints: see above   Pain scale:  7/10     Wakes the patient from sleep : Yes  Morning stiffness:Yes for 60 minutes  Meds used:otezla , colchicine, diclofenac gel, magic mouthwash, lidocaine gel      Interim history  Since last visit:  1. Infections - Yes/ had a sinus infection, thinks she is getting sick now  2. New symptoms/medical problem - Yes/ neurology sent pt to cardiology. Has a heart condition but not sure what . She is seeing a ortho. Due to knee pain   3. Any side effects from Rheum medications -nausea/vomiting from the  otezla   3. ER visits/Hospitalizations/surgeries - Yes/ seen in ER   4. Last PCP visit: yesterday     July 17, 2018  Have you ever seen a rheumatologist Yes Who You When 4/17/18  Joint pain history  Onset: pt Is here for a follow-up states that she started to get lesions on her legs , face and arm. Hurts all over, lower back is very painful. Right hand and wrist area are numb  Involved joints: see above  Pain scale:  7/10   worse in the morning  Wakes the patient from sleep : Yes/ does not go to sleep till late  Morning stiffness:Yes for 4-5 hours minutes  Meds used:colchicine, otezla has  Not started otezla yet due to extreme nausea      Interim history  Since last visit:  1. Infections - Yes/ had a bacterial infection in her pelvic area was hospitalized  2. New symptoms/medical problem - Yes/ hands are swelling up. Having orthopedic issues. Was having problem with her veins sticking up, and very painful. Has happened multiply times   3. Any side effects from Rheum medications -see above  3. ER visits/Hospitalizations/surgeries - Yes/ hospital and ER for bacterial infection  4. 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 ER said may have blood clot in calf, but when did MRI, stated body may have broken it up on it's own. Elevated D-dimer  Involved joints: Knees, neck, hands  Pain scale:  6.5/10     Wakes the patient from sleep : Yes  Morning stiffness:Yes for 180 minutes  Meds used:hyoscyamine, not taking otezla. Last dose Sep. Patient did not want to take otezla with the antibiotics.      Last major mouth ulcer- aug or Sep  No genital ulcers in the last 6 months.      Interim history  Since last visit:  1. Infections - Yes, UTI's twice a month since last visit  2. New symptoms/medical problem - No  3. Any side effects from Rheum medications -otzela causes  nausea  3. ER visits/Hospitalizations/surgeries - Yes, 1/2/19 repaired some ligaments and mass taken out, also joint build up removed from a previous injury  4. Last PCP visit: 12/5/19 June 12, 2019  Have you ever seen a rheumatologist yes Who you When 1/9/19  Joint pain history  Onset: Patient is here for a follow up. A lot of infections and in and out of the hospital, who wanted her to follow up with Rheumatology again.  Involved joints: knees, legs, back, neck, shoulders, ankles  Pain scale:  6.5/10     Wakes the patient from sleep : Yes  Morning stiffness:Yes for 120 minutes  Meds used:magic mouthwash, colchicine     Interim history  Since last visit:  1. Infections - Yes, staph  2. New symptoms/medical problem - kidney failure  3. Any side effects from Rheum medications -none  3. ER visits/Hospitalizations/surgeries - Yes, 3 hospitalizations, and surgeries,  4. Last PCP visit: 6/11/19 9/30/2020: New to me, establishing care. Flaring off otezla.     816   Reports worsening oral ulcers and joint pain and skin rash.     No vaginal ulcers currently. Last ones were 5 months ago.     Gets oral ulcers monthly, not today, had them last few days ago. They come up as flare up around period time. They self-resolve.     Thinks colcrys is helping but not enough, lost some benefit. No longer has diarrhea from it. the dose is 0.6 mg bid.     Came off otezla last year when she had severe staff infection, there was drug drug interaction with vancomycin. Wants to go back on it.     Skin lesions over chest are clearing up. Has skin lesions over her legs.     She is off of otezla >1 yr. She had daily vomiting and abdominal cramps initially which later resolved after 2 months.      Today, is her last day liz her health insurnaace  .     Reports pain and swelling liz hands. Drops things, lost strenghth. Veins look inflamed. Hands are swollen in AM and before bedtime. AM stiffness is 2 hours. can t tolerate ibuprofen.      Prednnisonne does not help much.     Wants to try eleve.     Had 2 blood clots over L arm, finished xraalto 4 months ago.      Was told to have severe dry eyes, hs not had eyes checked> 1 yr as was in the hospital with staff infection. systanee nd gentle eyedrops help some, sometimes gets sharp pain and and blurry vision in her eyes from dryness. No h/o uveitis.     has dry mouth, drinks water.     Gets T  F sometimes. It is sporadic.     Lost hair.     Gets intermittent CP. Had several hospital visits and admissions in the pst for it. lorazepam helped witch chest spasms, she does not like pain meds.     She was prescribed 0.5 mgq d prn, 10 tbs/monthss.     She gets dry cough, just started using albuterol inhaler, it helps.     No SOB.     Has gastroparesis, IBS  nd h/o severas admission for constiption, colon blockage with impaction and gets bloating. has lost follow up with GI.     She is working with  to get medical assistance to get insurance back by early next year. She filed it again.     Gets botox inj every 3 months nd they help, has to cancel 10/20 botox inj s will not have insurance. they came back yesterday.     last seizure waas last year. still gets black out spells, salts ne was last week.    derm eye gi       FHx: Both parents have RA.  SHx: She was working in a [employer] shop, it was closed because of pandemic. sexually active, not on oral contraceptives. She thinks she had a misccraaaiaage last wk, had n period x2 months then mueller bleeding a lot, dark red and was different from period. Felt something came out that she feels she miscarried, no pos pregnancy test. No smoking. rarely drinks ETOH.  [facility]   thumbs between MCP tender   otezla helped with skin lesions, oral ulcers, joint pain.  colcrys  sjogre aleve biotene voltaren [id number]        8/11/2022: Samara presents for urgent visit to discuss m/o Behcet's flare during pregnancy, she is   She is  pregnant 10 wk 3 days with ADRIAN 3/6/2023.  In 2020, had 1st trimester miscarriage.  Has LBP, ankle pain/swelling.   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 Discussed with CORNELL, back on low dose otezla since last week, only 1 tab a day.    Interval History  10/28/2022  Mrs. Oropeza was last seen 08/11/22. At that time she was advised to continue otezla (1 tablet BID) and remain off colcrys. Since that time, she has instead taken otezla 1 tablet daily. She notes more joint pain in hands, wrists, cervical spine, knees with 4 hours of monring stiffness. Previously <1hr with full strength.     Had a few genital ulcers recently which only lasted a few days. Oral ulcers have been more active of late and are currently healing.     Abdominal pain on L side, pain is always worse after eating. Out of the phase of her pregnancy with morning sickness, at 22w on Monday.     She describes a few fevers at the end of September with hot flashes and cold sweats with Tmax 100. Called OB-GYN who instructed she should go in if persistent, though it resolved within a few days.     More easy bruising - thighs, she is currently on lovenox and baby aspirin. Previous history of clots especially with interventions. She recently had an issue with superficial thrombophlebitis following a blood draw.     Seeing more floaters in her vision. Sometimes tiny and clear, but can also be larger and 'gray', up to 1/4 of her vision. Has not seen Ophthalmology in many years.     More frequent migraines - did a nerve block recently but this has worn off, previous botox injections.     ADRIAN 03/06/23.    1/25/23:    Baby is growing small. Had high BP yesterday, they would monitor it. DBP was 90.    Increasing otezla to bid helped. It helped with ulcers. Has skin breakdown since Wallace, never had it like this over her chest, but still it feels related to Behcet.    Hands and ankles are painful and  swoleln, ankles are new but had hand pain with behcet before,. This started fater entering 3rd trimster. Her ADRIAN might be earlier based on baby's growth, induction at 37 wk, progress at 39. nex twk will have another check.    Still has the neck pain. Still gets eye floaters.    6/21/2023:    She delivered 1 month early due to pre-eclampsia. Had Mg infusion, had bleeding issues, spent a week. Was on BP med till a month ago, now stable. Was induced, had NVD. No blood transfusions needed.    Her baby girl Lashanda was born 2/12/2023, small, slow growth but healthy with no fetal deformities. Had vaginal sores after giving birth. Still gets mouth ulcers, one oral ulcer now, no vaginal ulcers now. Plans to breast feed x 7 months. Does breastfeeding. Saw OB[facility]N in 4/20[facility] at [facility]. They are aware she is on otezla.    She was on ASA 81 mg every day during pregnancy.    9/27/23: Doing well, less behcet flares since giving birth. Most recent flare on her chest, now healing. No new oral or vaginal ulcers.     She has been having back pain that shoots down her right leg since June and has been utilizing PT without improvement.     She also has had swelling and pain in both hands and right wrist, as well as both ankles L>R.     She notes missing some doses of otezla due to running out of refills and avoiding taking it without food. She has been eating less because she has been more tired since having her daughter.       2/1/2024:    Has back pain. Had epidural inj one wk ago, has to wait x 2 weeks, if no help to get surgery for bulging disc    Some oral lesions 2wk ago flare with swollen hands, but currently has no oral ulcers.     Hands hurt today with pain level about 4/10. Celebrex did not help much.    No vaginal ulcers    Reports loss of appetite past 5 months    Her baby girl was sick x past 2 weeks, just got better last night, she had RSV. Samara did not get much sleep, did not eat much over past 2 weeks because  of this.      6/5/2024:    -active inflammatory arthritis despite adding HCQ last visit. HCQ causes GI upset    -fingers are deforming, new    -not feeling well, has neck pain, nausea x past few days    -still breastfeeding, no pregnancy plans    -missed otezla x 2 days, got some skin lesions back over chest area; otherwise otezla is managing her behcet's sores well    Review of Systems    A comprehensive ROS was done. Positives are per HPI.    Past Medical History   Past Medical History:   Diagnosis Date    Anemia     Anxiety     Arthritis     Behcet's disease (H)     Cervical adenitis May 2010    Chronic abdominal pain     Constipation, chronic 1994    Fibromyalgia     Gastro-oesophageal reflux disease     Gastroparesis     Irregular heart beat     tachycardia, has had workup    Migraines     Neuromuscular disorder (H)     fibramyalgia    Palpitations     PONV (postoperative nausea and vomiting)     Seizure (H)     Seizures (H)     unknown etiology    Syncope     Tourette's       Past Surgical History:   Procedure Laterality Date    ARTHROSCOPY ANKLE, OPEN REPAIR LIGAMENT, COMBINED Left 9/25/2019    Procedure: LEFT ANKLE ARTHROSCOPY WITH LIGAMENT REPAIR;  Surgeon: Andres Johnsno DPM;  Location:  OR    ARTHROSCOPY ANKLE, REPAIR LIGAMENT Left 1/2/2019    Procedure: Ankle arthroscopy and sinus tarsi evacuation, ligament repair, left lower extremity;  Surgeon: Andres Johnson DPM;  Location:  OR    ARTHROSCOPY KNEE WITH PATELLAR REALIGNMENT  7/25/2013    Procedure: ARTHROSCOPY KNEE WITH PATELLAR REALIGNMENT;  Left Knee Arthroscopy, Medial Patellofemoral Ligament Reconstruction with Allograft  ;  Surgeon: Jennifer Acevedo MD;  Location: [facility]    COLONOSCOPY  2015    DENTAL SURGERY  1996    Teeth removal    ENDOSCOPY UPPER, COLONOSCOPY, COMBINED  2005     ESOPH/GAS REFLUX TEST W NASAL IMPED >1 HR N/A 2/15/2017    Procedure: ESOPHAGEAL IMPEDENCE FUNCTION TEST WITH 24 HOUR PH GREATER THAN 1 HOUR;   Surgeon: Timothy Matta MD;  Location: [facility]    IR LUMBAR PUNCTURE  1/22/2024    IR PICC PLACEMENT > 5 YRS OF AGE  3/13/2019    IR UPPER EXTREMITY VENOGRAM LEFT  2/25/2020    IRRIGATION AND DEBRIDEMENT FOOT, COMBINED Left 3/12/2019    Procedure: COMBINED IRRIGATION AND DEBRIDEMENT LEFT ANKLE;  Surgeon: Micha Glover MD;  Location: [facility]    IRRIGATION AND DEBRIDEMENT LOWER EXTREMITY, COMBINED Left 5/7/2019    Procedure: 1.  Excision of wound down to and including deep fascia, less than 20 cm2.  2.  Irrigation and debridement, left ankle.;  Surgeon: Andres Johnson DPM;  Location: [facility] OR    PICC INSERTION Left 05/05/2019    4Fr - 45cm (5cm external), Basilic vein, SVC RA junction      Patient Active Problem List    Diagnosis Date Noted    Preeclampsia 02/10/2023     Priority: Medium    Infection of joint of ankle (H) 05/06/2019     Priority: Medium    Cellulitis 03/09/2019     Priority: Medium    Displacement of lumbar intervertebral disc without myelopathy 11/13/2018     Priority: Medium     Overview:   Created by Conversion      Iron deficiency associated with nonfamilial restless legs syndrome 11/13/2018     Priority: Medium    Enthesopathy of hip region 11/13/2018     Priority: Medium     Overview:   Created by Conversion      Tourette's syndrome 11/13/2018     Priority: Medium     Overview:   Created by Conversion      Somatic symptom disorder 06/01/2018     Priority: Medium    Pelvic floor weakness 04/25/2018     Priority: Medium    Spells of decreased attentiveness 12/19/2017     Priority: Medium    Mobile cecum (H) 11/09/2017     Priority: Medium     Cecum noted in Right lower quadrant on 4/17 CT scan, and in Left upper Quadrant on CT on 11/2017.      Vitamin D deficiency 10/11/2017     Priority: Medium     How low, unknown/not found. On D when tested at 27. Starting cholecalciferol October 2017. Needs recheck.      Convulsions, unspecified convulsion type (H) 10/03/2017     Priority:  Medium    Transient alteration of awareness 10/03/2017     Priority: Medium    Chronic pain syndrome 07/27/2017     Priority: Medium    Major depressive disorder, recurrent episode, moderate (H) 06/27/2017     Priority: Medium    Cervical pain 05/02/2017     Priority: Medium    Acute left ankle pain 03/31/2017     Priority: Medium    Cervical dystonia 03/28/2017     Priority: Medium    PTSD (post-traumatic stress disorder) 01/17/2017     Priority: Medium    Patellofemoral instability 10/20/2016     Priority: Medium    Fibromyalgia 08/04/2016     Priority: Medium    Rheumatoid arthritis of multiple sites without rheumatoid factor (H) 08/04/2016     Priority: Medium    Raynaud's disease without gangrene 08/04/2016     Priority: Medium    Chronic abdominal pain 08/04/2016     Priority: Medium    Palpitations 01/12/2016     Priority: Medium    On colchicine therapy 10/30/2015     Priority: Medium    Spell of shaking 05/06/2015     Priority: Medium    Migraine 02/04/2015     Priority: Medium    Behcet's disease (H) 12/10/2014     Priority: Medium    Headaches due to old head injury 11/12/2013     Priority: Medium    Milk protein intolerance 10/11/2013     Priority: Medium    Intestinal malabsorption 10/11/2013     Priority: Medium    Concussion 02/13/2013     Priority: Medium     Jan 2013, with prolonged recovery- followed by sports med        Knee pain 01/03/2013     Priority: Medium    Generalized anxiety disorder 06/25/2009     Priority: Medium    Tics - Tourette syndrome 05/18/2009     Priority: Medium     Followed by psychotherapy. Symptoms well managed. Originally diagnosed at [facility]. (Dr. Simpson)          IBS (irritable bowel syndrome) 05/18/2009     Priority: Medium    Allergic rhinitis 05/18/2009     Priority: Medium    GERD (gastroesophageal reflux disease) 01/10/2008     Priority: Medium    Gastroparesis 1994     Priority: Medium      Family History   Problem Relation Age of Onset     Depression Mother     Neurologic Disorder Mother         Migraines, take imitrex injection.  Also in maternal grandmother.      Alcohol/Drug Father     Hypertension Father     Depression Father     Osteoarthritis Father     Cardiovascular Maternal Grandmother     Depression Maternal Grandmother     Hypertension Maternal Grandmother     Alzheimer Disease Maternal Grandmother     Cardiovascular Maternal Grandfather     Hypertension Maternal Grandfather     Depression Maternal Grandfather     Alcohol/Drug Maternal Grandfather     Diabetes Maternal Grandfather     Cardiovascular Paternal Grandmother     Hypertension Paternal Grandmother     Cardiovascular Paternal Grandfather     Hypertension Paternal Grandfather     Glaucoma No family hx of     Macular Degeneration No family hx of       Allergies   Allergen Reactions    Amoxil [Penicillins] Rash     Dad unsure of reaction.    Bee Venom Anaphylaxis    Bioflavonoids Anaphylaxis    Citrus Anaphylaxis     All [place]    Contrast Dye Rash     Contrast Media Ready-Box [facility], 04/09/2014.; Contrast Media Ready-Box [facility], 04/09/2014.  NOTE: this is a contrast media oral with iodine. Premedicate with methylpred standard for IV contrast, request barium contrast for oral contrast.    Iodinated Contrast Media Hives and Rash     Contrast Media Ready-Box [facility], 04/09/2014.; Contrast Media Ready-[employer] [facility], 04/09/2014.  NOTE: this is a contrast media oral with iodine. Premedicate with methylpred standard for IV contrast, request barium contrast for oral contrast.    Pineapple Anaphylaxis, Difficulty breathing and Rash    Reglan [Metoclopramide] Other (See Comments)     IV dose only, in ER, rapid heart rate.    Ace Inhibitors      Difficulty in breathing and GI upset    Amitiza [Lubiprostone] Nausea and Vomiting    Amoxicillin-Pot Clavulanate     Midazolam Unknown     parent states that when pt takes this medication, she wakes up being very violent .    Midazolam Hcl      Coming out of  pelvic exam at age of 5, was kicking and screaming when coming out of the versed.    Other [No Clinical Screening - See Comments]      Bleech/ chest tightness, itchy throat, swollen tongue, hives    Tizanidine Other (See Comments)     Confusion, back pain, photophobia, abdominal pain, shaking, anxious      Adhesive Tape Rash    Azithromycin Hives and Rash    Cephalexin Itching and Rash    Sulfa Antibiotics Rash     Skin scarring      Current Outpatient Medications   Medication Sig Dispense Refill    amitriptyline (ELAVIL) 25 MG tablet TAKE ONE TABLET BY MOUTH AT BEDTIME 90 tablet 0    apremilast (OTEZLA) 30 MG tablet Take 1 tablet (30 mg) by mouth 2 times daily. Hold for signs of infection, and seek medical attention. 180 tablet 3    azaTHIOprine (IMURAN) 50 MG tablet Take 1 tablet (50 mg) by mouth daily with food. 30 tablet 1    citalopram (CELEXA) 20 MG tablet Take 1 tablet (20 mg) by mouth daily 90 tablet 1    lifitegrast (XIIDRA) 5 % opthalmic solution Place 1 drop into both eyes 2 times daily. 180 each 3    albuterol (PROAIR HFA/PROVENTIL HFA/VENTOLIN HFA) 108 (90 Base) MCG/ACT inhaler Inhale 2 puffs into the lungs every 6 hours as needed for shortness of breath / dyspnea or wheezing 1 Inhaler 1    artificial tears OINT ophthalmic ointment 0.5 inch strip each eye at night 1 Tube 11    azelaic acid (FINACIA) 15 % external gel Once daily to scars, upper chest and small portion of back and spot on belly button. Hold if irritating 50 g 5    benzocaine (AMERICAINE) 20 % external aerosol Apply to perineum four times daily as needed for pain 57 g 3    benzocaine (TOPICALE XTRA) 20 % GEL Apply 1 g to affected area 4 times daily as needed for mouth sores. 30 g 5    betamethasone valerate (VALISONE) 0.1 % cream Apply topically 2 times daily as needed       bisacodyl (DULCOLAX) 5 MG EC tablet Take 2 tablets (10 mg) by mouth daily as needed for constipation 30 tablet 0    EPINEPHrine (EPIPEN 2-EAMON) 0.3 MG/0.3ML injection  Inject 0.3 mLs (0.3 mg) into the muscle as needed for anaphylaxis 0.6 mL 3    etonogestrel (NEXPLANON) 68 MG IMPL Inject 68 mg Subcutaneous      fluocinonide (LIDEX) 0.05 % external gel Apply topically 2 times daily. 60 g 11    gabapentin (NEURONTIN) 300 MG capsule Take 1 capsule (300 mg) by mouth every morning 60 capsule 1    hydroquinone (ARACELY) 4 % external cream Apply once daily for 3 months. Stop for one month. If further lightening is desired, proceed with one additional month of treatment. 28 g 1    hydrOXYzine HCl (ATARAX) 25 MG tablet TAKE ONE OR TWO TABLETS BY MOUTH EVERY SIX HOURS AS NEEDED      lactulose 20 GM/30ML SOLN Take 30 mLs by mouth 3 times daily as needed (for constipation) 300 mL 3    lanolin ointment Apply topically every hour as needed for other (sore nipples) 7 g 3    lidocaine (LMX4) 4 % external cream Apply topically once as needed for mild pain 120 g 1    LINZESS 290 MCG capsule TAKE 1 CAPSULE BY MOUTH EVERY DAY IN THE MORNING BEFORE BREAKFAST 90 capsule 0    mometasone (ELOCON) 0.1 % external ointment Apply topically 2 times daily. 45 g 11    ondansetron (ZOFRAN ODT) 8 MG ODT tab Take 1 tablet (8 mg) by mouth every 8 hours as needed 90 tablet 3    polyethylene glycol (MIRALAX/GLYCOLAX) powder Take 1 capful by mouth 3 times daily      Prenatal MV-Min-Fe Fum-FA-DHA (PRENATAL 1 PO)       rizatriptan (MAXALT-MLT) 5 MG ODT DISSOLVE 1 OR 2 TABLETS IN THE MOUTH AS NEEDED FOR HEADACHE AT ONSET OF MIGRAINE, MAY REPEAT IN 2 HOURS AS NEEDED. MAX 30MG IN 24 HOURS AND MAX 5 DAYS PER MONTH      Sharps Container MISC Use for methotreaxte shots 1 each 11    sodium fluoride 1.1 % CREA Apply 1 Application topically daily      sucralfate (CARAFATE) 1 GM/10ML suspension Take 10 mLs (1 g) by mouth 4 times daily 1200 mL 2    triamcinolone (KENALOG) 0.1 % external ointment Apply twice daily as needed to lesions on the genitals and body. OK to use very sparingly on the face. 454 g 2    White  Petrolatum-Mineral Oil (ARTIFICIAL TEARS) 83-15 % OINT Apply 1 g to eye at bedtime. Apply to each eye at night at bedtime. 3.5 g 11           Physical Exam     GA: NAD, pleasant  HEENT: nl sclera/conj  MSK: Jaccoud arthropathy affecting 4th and 5th fingers, no synovitis  Skin: no rash  Neuro: non focal  Psych: nl affect             Data   Data      10/28/2022   BEAUCHAMP-28 (ESR) --   BEAUCHAMP-28 (CRP) --   Tender (BEAUCHAMP-28) 2 / 28    Swollen (BEAUCHAMP-28) 0 / 28    Provider Global --   Patient Global --   ESR 17 mm/hr   CRP --     No results found for any visits on 03/19/25.  CBC RESULTS: Recent Labs   Lab Test 09/07/20  1147   WBC 4.1   RBC 5.09   HGB 13.0   HCT 43.0   MCV 85   MCH 25.5*   MCHC 30.2*   RDW 15.0        TSH   Date Value Ref Range Status   09/07/2020 0.77 0.40 - 4.00 mU/L Final   03/21/2019 0.53 0.40 - 4.00 mU/L Final   10/30/2018 1.06 0.40 - 4.00 mU/L Final   11/26/2016 0.87 0.40 - 4.00 mU/L Final     T4 Free   Date Value Ref Range Status   01/31/2007 1.26 0.70 - 1.85 ng/dL Final     Rheumatoid Factor   Date Value Ref Range Status   02/06/2024 <10 <14 IU/mL Final   09/30/2020 <7 <12 IU/mL Final   05/06/2016 <20 <20 IU/mL Final     Component      Latest Ref Rng 3/4/2025  11:58 AM   WBC      4.0 - 11.0 10e3/uL 3.8 (L)    RBC Count      3.80 - 5.20 10e6/uL 4.60    Hemoglobin      11.7 - 15.7 g/dL 14.0    Hematocrit      35.0 - 47.0 % 41.9    MCV      78 - 100 fL 91    MCH      26.5 - 33.0 pg 30.4    MCHC      31.5 - 36.5 g/dL 33.4    RDW      10.0 - 15.0 % 12.1    Platelet Count      150 - 450 10e3/uL 193    % Neutrophils      % 64    % Lymphocytes      % 28    % Monocytes      % 6    % Eosinophils      % 2    % Basophils      % 0    % Immature Granulocytes      % 0    Absolute Neutrophils      1.6 - 8.3 10e3/uL 2.4    Absolute Lymphocytes      0.8 - 5.3 10e3/uL 1.1    Absolute Monocytes      0.0 - 1.3 10e3/uL 0.2    Absolute Eosinophils      0.0 - 0.7 10e3/uL 0.1    Absolute Basophils      0.0 - 0.2 10e3/uL 0.0     Absolute Immature Granulocytes      <=0.4 10e3/uL 0.0    Creatinine      0.51 - 0.95 mg/dL 0.68    GFR Estimate      >60 mL/min/1.73m2 >90    Sed Rate      0 - 20 mm/hr 5    CRP Inflammation      <5.00 mg/L <3.00    ALT      0 - 50 U/L 15    Albumin      3.5 - 5.2 g/dL 4.7    AST      0 - 45 U/L 19       Legend:  (L) Low  Reviewed Rheumatology lab flowsheet

## 2025-03-19 NOTE — LETTER
3/19/2025       RE: Samara Oropeza  [address]  [facility] [place] [id number]     Dear Colleague,    Thank you for referring your patient, Samara Oropeza, to the [facility] RHEUMATOLOGY [facility]INIC [place] at [facility]. Please see a copy of my visit note below.    Virtual Visit Details    Type of service:  Video Visit     Joined the call at 3/19/2025, 2:33:41 pm.  Left the call at 3/19/2025, 2:47:40 pm  Originating Location (pt. Location): Home  Distant Location (provider location):  On-site  Platform used for Video Visit: [facility]      Office Visit Details    Rheumatology Clinic Return Visit Patient     Samara Oropeza MRN# [id number]   YOB: 1995 Age: 29 year old     Date of Visit: 2025   Last seen: 1/15/2025         Assessment & Plan    Assessment & Plan  Samara is a 29 year old  female (ADRIAN 21) with Behçet's disease (pathergy, oral/genital ulcers, polyarthralgia) who presents today for RECHECK      # Behçet's disease (pathergy, arthralgias, recurrent oral / genital ulcers)  # s/p delivery on 2023 with high-risk pregnancy, complicated by pre-eclampsia,  birth but healthy baby    10/2022:  Mrs. Oropeza arrives for follow-up of her Behçet's disease that is more active at present with recent decrease of her Otezla (60mg --> 30mg) recommended by [facility]. Previous discussion with her OB providers had been to decrease to the lowest effective dose, clearly 30mg is not effective as she describes worsened oral ulcers, genital ulcers, arthralgias, abdominal pain, and thrombophlebitis. Although there is not a great deal of evidence for Otezla safety in pregnancy, its mechanism of action would suggest it. Counterbalancing these concerns are what we understand of Behçet's in pregnancy which more often improves but in some cases (~29%) becomes worse, and can flare more often in the 1st trimester and  post- period.  She also describes some vision changes, which in the context of Behçet's is conerning. She has not seen Ophthalmology in many years; I will place a referral today.     2023: Stable today, on otezla 30 mg bid, breastfeeding, MFM is ok with breastfeeding, discussed ACR reproductive guideline, it does not recommend otezla during pregnancy and breastfeeding given lack of data; however Samara remained on it during pregnancy and now breastfeeding as stopping it leads to severe flare of her behcet and benefits of staying on it outweighs risks. Her MFM provider is aware of staying on otezla and is ok with it during breastfeeding.    23: Doing well, remains on otezla. Has had some interruptions in doses. Today has back and hand pain, and ankle swelling. Will start celebrex, ok with breastfeeding.     2024:    Behcet's oral/vaginal ulcers are under fair control on otezla, will continue. But she continues to have active inflammatory arthritis, she failed celebrex. Will add  mg bid; risks were discussed. HCQ is safe in pregnancy and breastfeeding in case of pregnancy in future . Will check labs.      2024:    Active inflammatory arthritis despite adding HCQ in 2024, HCQ causes SE, fingers are deforming. Recommend to add methotrexate; risks were discussed. Was informed that it is not allowed in pregnancy and breastfeeding. She will start after she is done with breastfeeding in 2024. She prefers sub q inj over oral to avoid GI SEs. Will continue otezla to control oral/genital ulcers. I reviewed labs, stable.    2024:    Continues to have active inflammatory arthritis, plan is to start methotrexate as soon as she is done with breastfeeding; risks were discussed.    Stopped HCQ in 2024 given lack of benefit.    Oral/genital ulcers due to Behcet's are under control on otezla.    New itching, skin rashes are ongoing. I will message her derm team as prescribe topical is not  "covered by insurance.    Plan:    When you stop breastfeeding, start methotrexate 0.4 ml weekly under skin injection, our pharmacist Natalie ([facility] referral) will call you to teach you how to do it    Start folic acid 1 mg a day to help with methotrexate side effects    Stay on otezla    Labs one month after start of methotrexate    Avoid pregnancy on methotrexate    Return in about 3-4 months in person    1/15/24:    Conversations today reveal alfredo is currently in the midst of a flare, with arthralgia of the hands, neck and ankles. She reports having the flares about once a month. This episode has been ongoing for \"3-4 days\". She also shared a recent diagnosis of cataracts, as well as the onset of blurry vision and floaters of both eyes which began with this flare. Samara is still weaning her child off of breast feeding, and has not been able to trial methotrexate. We discussed starting Imuran once daily and she was open to the suggestion. There was some concern about bruising following Imuran previously, however, she does not recall such a reaction.     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    In light of her eye findings, we also discussed scheduling an urgent ophthalmology visit to rule out uveitis.     Plan:  - Start Imuran 50 mg once daily; risks were discussed. Per ACR reproductive guidelines, it is safe in pregnancy and breastfeeding  - Continue Otezla  - Labs today including HepB/C serolgy as well as Quantiferon TB testing in case of using remicade in future, as well as follow labs in 4 weeks to monitor effects of Imuran  - Ophthalmology referral made        Plan:    Add imuran 1 tab a day to otezla    Labs today and in 4 weeks    Urgent eye clinic appointment    Return video visit in about 3-4 months      Today 3/19/2025:    Active arthritis, no uveitis or " active rashes. Oral/genital ulcers fairly controlled on otezla. Did not tolerate AZA but joint pain improved. Can't do methotrexate, because she is breastfeeding. Remicade is also not the best option as we prescribe low dose methotrexate to prevent allergic reaction. Recommend cimzia as next best option; risks were discussed. Cimzia is safe in pregnancy, breastfeeding.      Plan:    Try cimzia every 2 weeks    Prednisone taper as needed    Keep June appointment with me    TT 40 min was spent on date of the encounter doing chart review, history and exam, documentation and further activities as noted above. Any prior notes, outside records, laboratory results, and imaging studies were reviewed if relevant.    The longitudinal plan of care for the diagnosis(es)/condition(s) as documented were addressed during this visit. Due to the added complexity in care, I will continue to support Samara in the subsequent management and with ongoing continuity of care.      Disclosure: I earn consulting income from [email or web address], the company that manufactures Otezla. I am not involved directly in promoting otezla or doing research with otezla.    Dominga Sosa MD                 Medical History   History of Present Illness  Samara Oropeza is a 29 year old female who presents for follow-up of her Behçet's.      Today 3/19/2025:      -pain in hands is the major complaint, worse in AM, feels alexx    -was on AZA 50 mg every day, it helped with hand pain, had SE of palpitations and SOB    -no immediate pregnancy plan, still breastfeeding    -3 wk ago, had genital lesions, still gets skin lesions over chest, mouth is ok, still on otezla    -eyes are better on eye drops    -gets headaches, has h/o migraines, worse recently, dizziness today, gets botox inj      1/15/2025   - Currently experiencing a flare with arthralgia in her hands, neck, and ankles, ongoing for 3-4 days    - She also reports new vision and floaters both eyes that  began this flare, and an Ophthalmology referral was placed as a result    - Currently weaning from breastfeeding and has not yet trialed methotrexate    - 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    9/18/2024:    -reports being in a flare    -hands are pretty painful, bent 5th fingers    -knuckles swell up in AM and at night, reports 3 hours of AM stiffness    -almost done with breastfeeding, has not started the methotrexate yet    -reports severe itching over legs from knees down to legs, no rash, skin looks darker where she is itching, pretty swollen as well    -she never this kind of itching before, reports this started after back surgery    -had back surgery 4/30/2024, decompression of a nerve, it helped with nerve pain, still feels numb/tingly down in her legs    -gets sports over chest, breast, saw derm, was prescribed topical which is not covered by insurance. Next step would be laser tx    Seen Dr. Marilyn Moran Rheumatology in [facility] 10/14:    Original presentation 2014:     Ms Oropeza is a 19 y.o. female who presents with one year of presentation of painful oral ulcers (monthly) once that have worsened with two episodes in the last two months that presented with painful vulvar or vaginal ulcers (2 episodes so far every month) [not sure if they leave scar] as well that timing coincides with menses ([facility]: 8/25/14).   ORAL ULCERS: last two episodes were severe, painful, white base with erythematous halo, that appear and disappear in the matter of 1-2 weeks without, does not bleed and doesn't respond to any treatment used (magic mouthwash), 10-15 in number with the biggest one being dime size.      VAGINAL ULCERS: 2-3 in number between labia majora and minora that occur simultaneously with oral ulcers, painful, non bleeding ulcers that are erythematous, no pus.      FOLLICULITIS: patient reports having spots in legs specially around ankle and knee, but arms, and neck are affected as  well. Small lesions that resemble ingrown hair, most are red erythematous elevated lesions, well demarcated, some with liquid that patient refers as pimple like.      SKIN RASHES: welts and red macules with well defined borders that are pruritic and non-ulcerative on chest, arms and back. Benadryl relieves.      ARTHRALGIAS: In descending order of severity: hips, knees, wrists, shoulders, neck, lumbar, fingers.   C/o morning stiffness in hips, back and neck lasting for about until noon.      Ms. Oropeza reports they present in severe flares and never fully resolve, but do get better. She has seen some swelling and warmth on the affected joints but has not noticed and redness. Has tried Tylenol, ibuprofen, and hydrocodone with not much benefit.      FEVERS: Reports 3-4 sporadic episodes per month of self limited fevers of 38 C that occur during the evenings and associate facial flushing.      HEADACHES: occur daily and are bitemporal and irradiate occipitally, pulsatile in nature, intensity varies from intense to mild, are worsened with movement. On treatment with ibuprofen and somatriptan without any positive results.      VISION CHANGES: Patient reports that suddenly she would only see a gray blotch in her right eyes and would persist like this for a day and a half. Also reports blurriness in her left eye. Presence of pain and burning sensation of eyeball with associated redness. Has changed prescription glasses in the matter of 2 years 3 times. She has had opthalmology exam and was not suggestive of any evidence of uveitis.   She was also evaluated in ED for a dizzy spell.      ABD PAIN W/ INTERMITTENT DIARRHEA AND CONSTIPATION: Patient reports abdominal pain that is intermittent, periods of 7 days without bowel movement and feeling bloated with change of pattern to diarrhea (liquidy without blood nor mucus, non foul smelling). Has taken Bentyl, Zegrid, and rinetidine with mild clinical improvement.  She also  reports small red nodules after each needle stick for blood draw.   Neurology saw her back then and was not impressed with her presentation as physical examination was normal. Also MRI brain normal: Findings: No definite hemorrhage, mass affect, midline shift, or ventriculomegaly is noted.There are a few scattered non specific white matter T2 hyperintensities. Axial diffusion weighted images are unremarkable.     The major vascular structures appear patent. There is opacification and severe mucosal thickening in the right maxillary sinus. The remaining paranasal sinuses, and mastoid air cells are clear. Orbits are unremarkable  She has + HSV-1 IGG. Seen ID. Negative for Herpes simplex virus culture. HSV IGM I/II COMBINATION SENT TO [facility]  RESULTS = <0.91  REF RANGE: <0.91 = NEGATIVE  ID did not think HSV could explain her mouth and genital sores.      CRP within normal limits. HIV negative. CBC within normal limits; UA negative. Creatinine within normal limits   CYNDIE neg.  Antigliadin neg.   ANti TTG neg.   [facility] 2014: Colonoscopy and endoscopy neg; result not available. Not sure if biopsies were done.   Raynaud's phenomenon:  Onset: about 2013  Digits: all fingers  Digital ulcers: no  Color changes: white ---> purple and red  Duration of an attack: About couple of hours per mom  Pain during attack: not clear pain but bruise like feeling  Frequency of attacks: few times a month  Family history of Raynauds: no  GERD: very long time     No hemoptysis.   No miscarriages/ no thrombosis in past.   No family or personal history of psoriasis, ulcerative colitis or chron's disease.   No buttock pain or low back pain or stiffness in AM     Interim history:  Dec 2014- Multiple mouth ulcers and did not eat for 5 days. This coincided with periods. Colchicine 0.6mg PO BID started in Nov 2014.   Prednisone taper in Dec 20mg to 0 in 4 weeks. She also used magic mouthwash. Kenalog cream. After going to the ER, 3 days later her  ulcers were better. She thinks it improved after the menstruation stopped.   LMP 3 weeks ago.   COLCHICINE HAS HELPED A LOT REDUCING THE INTENSITY OF MENSTRUATION ASSOCIATED ORAL AND VULVAR ULCERS.      Interim history: 6/15     Since last visit only two episodes of mouth sores- small sized 6   No genital ulcers since last visit.   Colchicine 1.2 mg in AM and 0.6mg in PM keeps her symptoms under control.      Admitted in 5/15:  Admission Diagnoses:  - LUE weakness  - spell  - hx of migraines  - hx of Tourette syndrome  - hx of Behcet's disease     Discharge Diagnoses:   - spell likely 2/2 anxiety  - hx of migraines  - hx of Tourette syndrome  - hx of Behcet's disease     CT and MRI brain was normal.      Seeing Dr. Lilliana Miramontes soon as outpatient.      Dr. Garcia did not find any intraocular inflammation.       Interm 10/30/2015  ED for migraine. Continues to see neuro - MRI brain without lesions noted. Saw GI - upper barium normal. May be considering repeat colo. Worsening lesions in mouth and genitals. Has had continuous lesion in mouth x 2 months. 2 genital ulcers. Had fracture of right sesamoid in foot. Continues to have low grade fevers. New folliculitis on chest, legs and arms.      Interim hx: 1/11/2016    Pt states that since last visit she continues to have oral ulcers and has noted more folliculitis-like lesions on her lower extremities.  She states that on her right lower leg she had a red macular spot that has since resolved.  She denies uveitis.  She states that the colchicine initially helped but then stopped working.  She takes 0.6 mg TID.  She stopped taking her prednisone last week as she feels like this hasn't helped.  She hasn't had any episodes of vaginal ulcers.      She saw GI who stated she has gastroparesis.  She is seeing Cardiology later this week for possible syncopal episodes.       Interim history 5/16  Mouth ulcers X 1 last month  Genital ulcers - none since last visit.     "  Bilateral MCPs pain, knee pain and low back pain +ve increased since last visit  Morning stiffness X 2 hours (increasing)   5-6/10     \"overall myalgia\" has gotten worse    C/o pseudofolliculitis lesion on anterior shins bilaterally worse     Interim history: (7/18/2016)  Patient has just started Humira for 2 doses on 7/1 and 7/15 and reported minimal improvement of her hip pain but otherwise, did not notice anything different.      She reported painful mouth ulcer once a month since last visit but noticed that it has been getting deeper.   Denied any genital ulcers.     Still has ongoing bilateral MCPs pain, knee pain but this has been intermittent and she did not have any much pain today. She reported 2-3 hours morning stiffness of both hands and pain score of 6/10 at her knees and 8/10 of her hands but currently takes no pain medication except prn ativan which she takes when her muscle is really tight.      The only concern is that she gained weight even though she has not been eating much (eat once a day) and do exercises every day for 1 hour (with video of yoga, pilates). Her mother wonders if she needs to have some labs work up include sex hormone, thyroid, cortisol.     Interval history 9/14/16  Patient was started Humira at the last visit, patient reports worsening of skin ulcers since starting Humira and stopped taking Humura for the past 2 weeks. Patient reports oral and genital ulcers has been stable even before starting Humira and has not had any interval oral/genital ulcers. However she reports recurrent skin ulcers occurring on a daily basis that affects her chest and anterior legs. Patient also has stopped taking prednisone, she reports that she doesn't feel the prednisone helps, her last dose of prednisone was 6+ months ago. Patient also report worsening low back pain that sometimes causes her to limp.     In the interval patient also visited ED on 8/31/16 for left hand pain and what the patient " describes as phlebitis, no medication or procedure was done and the patient was discharged with a splint. Patient also reported 1 episode of chest pressure that was associated with dyspnea and numbness of the left arm, she was shopping in a supermarket at the time of onset, and the pressure resolved after she took a nap.     Patient denies any infectious symptoms in the interval, no fever, chills, night sweat, cough, dysuria, denies eye pain or visual changes.     November 4, 2016  Oct - had one genital ulcer  Oral ulcers X 5- around menstruation time.   Knee pain- chronic on the left side  Dizziness spells - on and off; been to the ER for that in the past.   On and off migraines  July 2013 - s/p MPFL reconstruction plus LRL 7/2013  Morning stiffness in knee (left) X 1 hour     Severe jaw pain and chest pain- patient wondering if this is Tourette's or esophageal spasms. Cardiac workup negative.   Fever      January 13, 2017  Yesterday in ER [facility] with orthostatic syncope from dehydration.   Chest pain on and off still continues. Painful with deep breaths.   Oral ulcers - few minor ones lasting for one week;   One major one in roof of mouth lasting for about one week.   Knee pain +ve - reviewed the recent MRI with her orthopedic surgeon.   On and off migraines  otezla is approved. Still waiting to receive the meds.      March 31, 2017  Otezla current dose 15mg PO BID.   She is tolerating okay at this dose and is willing to increase the dose further up to 30mg BID.   Her joint pains are better on otezla. Knee pain is also better.   Back and neck pain +ve 8/10 when it is worse. Intramuscular botox injections were given on Monday in PMR.   2 minor genital ulcers in the interim- resolved.   Few oral ulcers in the interim- resolved.   Nasal ulcer - resolved.   Migraines on and off.   Nausea with otezla but improving.   Dizziness and blackouts on and off. She fell once and hurt her ankle. Wearing boot in left leg.    Complete ROS negative except for above     May 17, 2017  Tolerating otezla better. Not throwing up anymore. Current dose 20mg in AM and 30mg in PM (2nd week).   Patient thinks that her oral ulcers frequency and severity are improving with otezla. Also no genital ulcers in the interim.   Currently on doxycycline for bronchitis/?pneunomia. 4 more days left.      Joint pain history  2 weeks ago/ dx with pneumonia 1 week ago/  Involved joints: all over  Pain scale: 6.5/10   Wakes the patient from sleep : Yes  Morning stiffness: Yes for 120 minutes  Meds used:otezla,colchicine     Interim history  Since last visit:  1. Infections - Yes/ pneumonia  2. New symptoms/medical problem - Yes/ thinks she may have had a mini-seizure about 10 days ago ; did not seek medical attention; did not reoccur after that. Patient seeing PCP today.  3. Any side effects from Rheum medications -vomiting a lot (resolved)  3. ER visits/Hospitalizations/surgeries - Yes  4. Last PCP visit: has an apt. today     Patient still getting double vision. Floaters present.      Therapist recommended psychiatry evaluation. Patient does not want to see psychaitry. Patient will see PCP today.      August 22, 2017  Have you ever seen a rheumatologist Yes Who You When 5/17/17     NO genital ulcers in the interim.   Mouth ulcers- three in the back of the throat and roof of the mouth last month.      Joint pain history  Onset: doing alittle worse / cut her otezla back to 30mg  Daily due to GI problems  Involved joints: all over her body. Been having more black out episodes, been having more chest pains.also has raised bumps on both legs from the knees down that itch and are painful   Pain scale:  5.5/10     Wakes the patient from sleep : Yes  Morning stiffness:Yes for 120 minutes  Meds used:otezla, colchicine (three pills daily)     Interim history  Since last visit:  1. Infections - Yes stomach issue  2. New symptoms/medical problem - No  3. Any side effects  from Rheum medications -nausea from otezla  3. ER visits/Hospitalizations/surgeries - Yes, ER for foot, ankle injury from passing out and fell down the steps. Hit her head another time from passing out. Alcohol poisoning in July 4. Last PCP visit: 6/23/17 September 19, 2017  Have you ever seen a rheumatologist Yes Who You When 8/22/17  Joint pain history  Onset: pt states that she hurts everywhere for 6 days  Involved joints: all joints  Pain scale:  7/10     Wakes the patient from sleep : Yes/ not sleeping at all  Morning stiffness:Yes for 180 minutes  Meds used:colchicine, otezla, magic mouth wash, lidocaine gel  Last one week: increased joint pain; and genital ulcer  Genital lesion (dimed size) in the last one week  After botox a week ago, she had fever 101 for three days; near syncopes. Back pain; floaters  Chest pain , mouth sores, hand pain, morning pain were all better with otezla 30mg twice daily.     Interim history  Since last visit:  1. Infections - No  2. New symptoms/medical problem - No  3. Any side effects from Rheum medications -nausea from the otezla  3. ER visits/Hospitalizations/surgeries - No  4. Last PCP visit: may 2017      October 18, 2017     Have you ever seen a rheumatologist Yes Who You When 9/19/17  Joint pain history  NO mouth or genital sores in the last 4 weeks.   Medrol dosepak helped with visual symptoms but not joint pains.      Onset: pt states that she is doing better than last time with her behcet's. Today has severe stomach pains, states that she is constipated. Pt had a seizure 2 days ago. Does have a metallic taste in her mouth  Involved joints: hands , neck, shoulders  Pain scale:  9/10 from stomach pain;      Wakes the patient from sleep : Yes  Morning stiffness:Yes for 120 minutes  Meds used:cochicine , otezla 30mg twice daily     Interim history  Since last visit:  1. Infections - No  2. New symptoms/medical problem - Yes/ the cartilage in her chest is inflamed  3.  Any side effects from Rheum medications -nausea from the otezla  3. ER visits/Hospitalizations/surgeries - No  4. Last PCP visit: yesterday     January 16, 2018  Have you ever seen a rheumatologist Yes Who You When 10/18/17  Joint pain history  Onset: pt states that she was in the hospital for 5 days before maribell, they told her she had lesions in her brain in the white matter. Had blood work drawn in the ER and they told her her inflammatory markers were elevated. Was seen in the ER last week for vomiting and diarrhea, not eating. Saw Gastro. On 1/10/18. 1.5 weeks ago she had an endoscopy and colonoscopy. She has been having elbow  And hands and knee pain, loosing use of her hands. Been dropping things. Also states that she has been getting lesions up her nose  Involved joints: see above  Pain scale:  8/10     Wakes the patient from sleep : Yes  Morning stiffness:Yes for 120 minutes  Meds used:colchisine, otezla, magic mouth wash, kenolog cream, lidocaine gel     Interim history  Since last visit:  1. Infections - Yes/ had an infection in her jaw. Having sinus issues  2. New symptoms/medical problem - Yes/ states that she is having problems walking, states that she was bouncing when she was walking  3. Any side effects from Rheum medications -otezla, nausea  3. ER visits/Hospitalizations/surgeries - Yes/ see chart  4. Last PCP visit: November 2017 April 17, 2018  Have you ever seen a rheumatologist Yes Who You When 1/16/18  Joint pain history  Onset: pt states that she is not doing well. She is having increased stomach issues, only eats 2 times in 4 days unable to keep the otezla down due to vomiting . Has sleep study done in 1 week; no genital ulcers since last appointment. 6 small mouth sores since last appointment.   Involved joints: see above   Pain scale:  7/10     Wakes the patient from sleep : Yes  Morning stiffness:Yes for 60 minutes  Meds used:otezla , colchicine, diclofenac gel, magic mouthwash,  lidocaine gel      Interim history  Since last visit:  1. Infections - Yes/ had a sinus infection, thinks she is getting sick now  2. New symptoms/medical problem - Yes/ neurology sent pt to cardiology. Has a heart condition but not sure what . She is seeing a ortho. Due to knee pain   3. Any side effects from Rheum medications -nausea/vomiting from the otezla   3. ER visits/Hospitalizations/surgeries - Yes/ seen in ER   4. Last PCP visit: yesterday     July 17, 2018  Have you ever seen a rheumatologist Yes Who You When 4/17/18  Joint pain history  Onset: pt Is here for a follow-up states that she started to get lesions on her legs , face and arm. Hurts all over, lower back is very painful. Right hand and wrist area are numb  Involved joints: see above  Pain scale:  7/10   worse in the morning  Wakes the patient from sleep : Yes/ does not go to sleep till late  Morning stiffness:Yes for 4-5 hours minutes  Meds used:colchicine, otezla has  Not started otezla yet due to extreme nausea      Interim history  Since last visit:  1. Infections - Yes/ had a bacterial infection in her pelvic area was hospitalized  2. New symptoms/medical problem - Yes/ hands are swelling up. Having orthopedic issues. Was having problem with her veins sticking up, and very painful. Has happened multiply times   3. Any side effects from Rheum medications -see above  3. ER visits/Hospitalizations/surgeries - Yes/ hospital and ER for bacterial infection  4. 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 ER said may have blood clot in calf, but when did MRI, stated body may have broken it up on it's own. Elevated D-dimer  Involved joints: Knees, neck, hands  Pain scale:  6.5/10     Wakes the patient from sleep : Yes  Morning stiffness:Yes for 180 minutes  Meds used:hyoscyamine, not taking  otezla. Last dose Sep. Patient did not want to take otezla with the antibiotics.      Last major mouth ulcer- aug or Sep  No genital ulcers in the last 6 months.      Interim history  Since last visit:  1. Infections - Yes, UTI's twice a month since last visit  2. New symptoms/medical problem - No  3. Any side effects from Rheum medications -otzela causes nausea  3. ER visits/Hospitalizations/surgeries - Yes, 1/2/19 repaired some ligaments and mass taken out, also joint build up removed from a previous injury  4. Last PCP visit: 12/5/19 June 12, 2019  Have you ever seen a rheumatologist yes Who you When 1/9/19  Joint pain history  Onset: Patient is here for a follow up. A lot of infections and in and out of the hospital, who wanted her to follow up with Rheumatology again.  Involved joints: knees, legs, back, neck, shoulders, ankles  Pain scale:  6.5/10     Wakes the patient from sleep : Yes  Morning stiffness:Yes for 120 minutes  Meds used:magic mouthwash, colchicine     Interim history  Since last visit:  1. Infections - Yes, staph  2. New symptoms/medical problem - kidney failure  3. Any side effects from Rheum medications -none  3. ER visits/Hospitalizations/surgeries - Yes, 3 hospitalizations, and surgeries,  4. Last PCP visit: 6/11/19 9/30/2020: New to me, establishing care. Flaring off otezla.     816   Reports worsening oral ulcers and joint pain and skin rash.     No vaginal ulcers currently. Last ones were 5 months ago.     Gets oral ulcers monthly, not today, had them last few days ago. They come up as flare up around period time. They self-resolve.     Thinks colcrys is helping but not enough, lost some benefit. No longer has diarrhea from it. the dose is 0.6 mg bid.     Came off otezla last year when she had severe staff infection, there was drug drug interaction with vancomycin. Wants to go back on it.     Skin lesions over chest are clearing up. Has skin lesions over her legs.     She is  off of otezla >1 yr. She had daily vomiting and abdominal cramps initially which later resolved after 2 months.      Today, is her last day liz her health insurnaace  .     Reports pain and swelling liz hands. Drops things, lost strenghth. Veins look inflamed. Hands are swollen in AM and before bedtime. AM stiffness is 2 hours. can t tolerate ibuprofen.     Prednnisonne does not help much.     Wants to try eleve.     Had 2 blood clots over L arm, finished xraalto 4 months ago.      Was told to have severe dry eyes, hs not had eyes checked> 1 yr as was in the hospital with staff infection. systanee nd gentle eyedrops help some, sometimes gets sharp pain and and blurry vision in her eyes from dryness. No h/o uveitis.     has dry mouth, drinks water.     Gets T  F sometimes. It is sporadic.     Lost hair.     Gets intermittent CP. Had several hospital visits and admissions in the [facility] for it. lorazepam helped witch chest spasms, she does not like pain meds.     She was prescribed 0.5 mgq d prn, 10 tbs/monthss.     She gets dry cough, just started using albuterol inhaler, it helps.     No SOB.     Has gastroparesis, IBS  nd h/o severas admission for constiption, colon blockage with impaction and gets bloating. has lost follow up with GI.     She is working with  to get medical assistance to get insurance back by early next year. She filed it again.     Gets botox inj every 3 months nd they help, has to cancel 10/20 botox inj s will not have insurance. they came back yesterday.     last seizure waas last year. still gets black out spells, salts ne was last week.    derm eye gi       FHx: Both parents have RA.  SHx: She was working in a [email or web address] shop, it was closed because of pandemic. sexually active, not on oral contraceptives. She thinks she had a misccraaaiaage last wk, had n period x2 months then mueller bleeding a lot, dark red and was different from period. Felt something came out that she feels she  miscarried, no pos pregnancy test. No smoking. rarely drinks ETOH.  [facility]   thumbs between MCP tender   otezla helped with skin lesions, oral ulcers, joint pain.  colcrys  sjogre aleyadiel biotene voltaren [phone]        8/11/2022: Samara presents for urgent visit to discuss m/o Behcet's flare during pregnancy, she is   She is pregnant 10 wk 3 days with ADRIAN 3/6/2023.  In 2020, had 1st trimester miscarriage.  Has LBP, ankle pain/swelling.   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 Discussed with MFM, back on low dose otezla since last week, only 1 tab a day.    Interval History 10/28/2022  Mrs. Oropeza was last seen 08/11/22. At that time she was advised to continue otezla (1 tablet BID) and remain off colcrys. Since that time, she has instead taken otezla 1 tablet daily. She notes more joint pain in hands, wrists, cervical spine, knees with 4 hours of monring stiffness. Previously <1hr with full strength.     Had a few genital ulcers recently which only lasted a few days. Oral ulcers have been more active of late and are currently healing.     Abdominal pain on L side, pain is always worse after eating. Out of the phase of her pregnancy with morning sickness, at 22w on Monday.     She describes a few fevers at the end of September with hot flashes and cold sweats with Tmax 100. Called OB-GYN who instructed she should go in if persistent, though it resolved within a few days.     More easy bruising - thighs, she is currently on lovenox and baby aspirin. Previous history of clots especially with interventions. She recently had an issue with superficial thrombophlebitis following a blood draw.     Seeing more floaters in her vision. Sometimes tiny and clear, but can also be larger and 'gray', up to 1/4 of her vision. Has not seen Ophthalmology in many years.     More frequent migraines - did a nerve block recently but this has worn off,  previous botox injections.     ADRIAN 03/06/23.    1/25/23:    Baby is growing small. Had high BP yesterday, they would monitor it. DBP was 90.    Increasing otezla to bid helped. It helped with ulcers. Has skin breakdown since Christmas, never had it like this over her chest, but still it feels related to Behcet.    Hands and ankles are painful and swoleln, ankles are new but had hand pain with behcet before,. This started fater entering [address] [place] [address]. Her ADRIAN might be earlier based on baby's growth, induction at 37 wk, progress at 39. nex twk will have another check.    Still has the neck pain. Still gets eye floaters.    6/21/2023:    She delivered 1 month early due to pre-eclampsia. Had Mg infusion, had bleeding issues, spent a week. Was on BP med till a month ago, now stable. Was induced, had NVD. No blood transfusions needed.    Her baby girl Lashanda was born 2/12/2023, small, slow growth but healthy with no fetal deformities. Had vaginal sores after giving birth. Still gets mouth ulcers, one oral ulcer now, no vaginal ulcers now. Plans to breast feed x 7 months. Does breastfeeding. Saw OB/[facility]N in 4/2023 [facility]. They are aware she is on otezla.    She was on ASA 81 mg every day during pregnancy.    9/27/23: Doing well, less behcet flares since giving birth. Most recent flare on her chest, now healing. No new oral or vaginal ulcers.     She has been having back pain that shoots down her right leg since June and has been utilizing PT without improvement.     She also has had swelling and pain in both hands and right wrist, as well as both ankles L>R.     She notes missing some doses of otezla due to running out of refills and avoiding taking it without food. She has been eating less because she has been more tired since having her daughter.       2/1/2024:    Has back pain. Had epidural inj one wk ago, has to wait x 2 weeks, if no help to get surgery for bulging disc    Some oral lesions 2wk ago flare with  swollen hands, but currently has no oral ulcers.     Hands hurt today with pain level about 4/10. Celebrex did not help much.    No vaginal ulcers    Reports loss of appetite past 5 months    Her baby girl was sick x past 2 weeks, just got better last night, she had RSV. Samara did not get much sleep, did not eat much over past 2 weeks because of this.      6/5/2024:    -active inflammatory arthritis despite adding HCQ last visit. HCQ causes GI upset    -fingers are deforming, new    -not feeling well, has neck pain, nausea x past few days    -still breastfeeding, no pregnancy plans    -missed otezla x 2 days, got some skin lesions back over chest area; otherwise otezla is managing her behcet's sores well    Review of Systems   A comprehensive ROS was done. Positives are per HPI.    Past Medical History  Past Medical History:   Diagnosis Date     Anemia      Anxiety      Arthritis      Behcet's disease (H)      Cervical adenitis May 2010     Chronic abdominal pain      Constipation, chronic 1994     Fibromyalgia      Gastro-oesophageal reflux disease      Gastroparesis      Irregular heart beat     tachycardia, has had workup     Migraines      Neuromuscular disorder (H)     fibramyalgia     Palpitations      PONV (postoperative nausea and vomiting)      Seizure (H)      Seizures (H)     unknown etiology     Syncope      Tourette's       Past Surgical History:   Procedure Laterality Date     ARTHROSCOPY ANKLE, OPEN REPAIR LIGAMENT, COMBINED Left 9/25/2019    Procedure: LEFT ANKLE ARTHROSCOPY WITH LIGAMENT REPAIR;  Surgeon: Andres Johnson DPM;  Location:  OR     ARTHROSCOPY ANKLE, REPAIR LIGAMENT Left 1/2/2019    Procedure: Ankle arthroscopy and sinus tarsi evacuation, ligament repair, left lower extremity;  Surgeon: Andres Johnson DPM;  Location:  OR     ARTHROSCOPY KNEE WITH PATELLAR REALIGNMENT  7/25/2013    Procedure: ARTHROSCOPY KNEE WITH PATELLAR REALIGNMENT;  Left Knee Arthroscopy, Medial  Patellofemoral Ligament Reconstruction with Allograft  ;  Surgeon: Jennifer Acevedo MD;  Location: [facility]     COLONOSCOPY  2015     DENTAL SURGERY  1996    Teeth removal     ENDOSCOPY UPPER, COLONOSCOPY, COMBINED  2005     HC ESOPH/GAS REFLUX TEST W NASAL IMPED >1 HR N/A 2/15/2017    Procedure: ESOPHAGEAL IMPEDENCE FUNCTION TEST WITH 24 HOUR PH GREATER THAN 1 HOUR;  Surgeon: Timothy Matta MD;  Location: [facility]     IR LUMBAR PUNCTURE  1/22/2024     IR PICC PLACEMENT > 5 YRS OF AGE  3/13/2019     IR UPPER EXTREMITY VENOGRAM LEFT  2/25/2020     IRRIGATION AND DEBRIDEMENT FOOT, COMBINED Left 3/12/2019    Procedure: COMBINED IRRIGATION AND DEBRIDEMENT LEFT ANKLE;  Surgeon: Micha Glover MD;  Location: [facility]     IRRIGATION AND DEBRIDEMENT LOWER EXTREMITY, COMBINED Left 5/7/2019    Procedure: 1.  Excision of wound down to and including deep fascia, less than 20 cm2.  2.  Irrigation and debridement, left ankle.;  Surgeon: Andres Johnson DPM;  Location: [facility]     PICC INSERTION Left 05/05/2019    4Fr - 45cm (5cm external), Basilic vein, SVC RA junction      Patient Active Problem List    Diagnosis Date Noted     Preeclampsia 02/10/2023     Priority: Medium     Infection of joint of ankle (H) 05/06/2019     Priority: Medium     Cellulitis 03/09/2019     Priority: Medium     Displacement of lumbar intervertebral disc without myelopathy 11/13/2018     Priority: Medium     Overview:   Created by Conversion       Iron deficiency associated with nonfamilial restless legs syndrome 11/13/2018     Priority: Medium     Enthesopathy of hip region 11/13/2018     Priority: Medium     Overview:   Created by Conversion       Tourette's syndrome 11/13/2018     Priority: Medium     Overview:   Created by Conversion       Somatic symptom disorder 06/01/2018     Priority: Medium     Pelvic floor weakness 04/25/2018     Priority: Medium     Spells of decreased attentiveness 12/19/2017     Priority: Medium      Mobile cecum (H) 11/09/2017     Priority: Medium     Cecum noted in Right lower quadrant on 4/17 CT scan, and in Left upper Quadrant on CT on 11/2017.       Vitamin D deficiency 10/11/2017     Priority: Medium     How low, unknown/not found. On D when tested at 27. Starting cholecalciferol October 2017. Needs recheck.       Convulsions, unspecified convulsion type (H) 10/03/2017     Priority: Medium     Transient alteration of awareness 10/03/2017     Priority: Medium     Chronic pain syndrome 07/27/2017     Priority: Medium     Major depressive disorder, recurrent episode, moderate (H) 06/27/2017     Priority: Medium     Cervical pain 05/02/2017     Priority: Medium     Acute left ankle pain 03/31/2017     Priority: Medium     Cervical dystonia 03/28/2017     Priority: Medium     PTSD (post-traumatic stress disorder) 01/17/2017     Priority: Medium     Patellofemoral instability 10/20/2016     Priority: Medium     Fibromyalgia 08/04/2016     Priority: Medium     Rheumatoid arthritis of multiple sites without rheumatoid factor (H) 08/04/2016     Priority: Medium     Raynaud's disease without gangrene 08/04/2016     Priority: Medium     Chronic abdominal pain 08/04/2016     Priority: Medium     Palpitations 01/12/2016     Priority: Medium     On colchicine therapy 10/30/2015     Priority: Medium     Spell of shaking 05/06/2015     Priority: Medium     Migraine 02/04/2015     Priority: Medium     Behcet's disease (H) 12/10/2014     Priority: Medium     Headaches due to old head injury 11/12/2013     Priority: Medium     Milk protein intolerance 10/11/2013     Priority: Medium     Intestinal malabsorption 10/11/2013     Priority: Medium     Concussion 02/13/2013     Priority: Medium     Jan 2013, with prolonged recovery- followed by sports med         Knee pain 01/03/2013     Priority: Medium     Generalized anxiety disorder 06/25/2009     Priority: Medium     Tics - Tourette syndrome 05/18/2009     Priority: Medium      Followed by psychotherapy. Symptoms well managed. [facility])           IBS (irritable bowel syndrome) 05/18/2009     Priority: Medium     Allergic rhinitis 05/18/2009     Priority: Medium     GERD (gastroesophageal reflux disease) 01/10/2008     Priority: Medium     Gastroparesis 1994     Priority: Medium      Family History   Problem Relation Age of Onset     Depression Mother      Neurologic Disorder Mother         Migraines, take imitrex injection.  Also in maternal grandmother.       Alcohol/Drug Father      Hypertension Father      Depression Father      Osteoarthritis Father      Cardiovascular Maternal Grandmother      Depression Maternal Grandmother      Hypertension Maternal Grandmother      Alzheimer Disease Maternal Grandmother      Cardiovascular Maternal Grandfather      Hypertension Maternal Grandfather      Depression Maternal Grandfather      Alcohol/Drug Maternal Grandfather      Diabetes Maternal Grandfather      Cardiovascular Paternal Grandmother      Hypertension Paternal Grandmother      Cardiovascular Paternal Grandfather      Hypertension Paternal Grandfather      Glaucoma No family hx of      Macular Degeneration No family hx of       Allergies   Allergen Reactions     Amoxil [Penicillins] Rash     Dad unsure of reaction.     Bee Venom Anaphylaxis     Bioflavonoids Anaphylaxis     Citrus Anaphylaxis     All Rives     Contrast Dye Rash     Contrast Media Ready-Box [facility] [place], 04/09/2014.; Contrast Media Ready-Box [facility] [place], 04/09/2014.  NOTE: this is a contrast media oral with iodine. Premedicate with methylpred standard for IV contrast, request barium contrast for oral contrast.     Iodinated Contrast Media Hives and Rash     Contrast Media Ready-Box [facility] [place], 04/09/2014.; Contrast Media Ready-Box [facility] [place], 04/09/2014.  NOTE: this is a contrast media oral with iodine. Premedicate with methylpred standard for IV contrast, request barium contrast for oral  contrast.     Pineapple Anaphylaxis, Difficulty breathing and Rash     Reglan [Metoclopramide] Other (See Comments)     IV dose only, in ER, rapid heart rate.     Ace Inhibitors      Difficulty in breathing and GI upset     Amitiza [Lubiprostone] Nausea and Vomiting     Amoxicillin-Pot Clavulanate      Midazolam Unknown     parent states that when pt takes this medication, she wakes up being very violent .     Midazolam Hcl      Coming out of pelvic exam at age of 5, was kicking and screaming when coming out of the versed.     Other [No Clinical Screening - See Comments]      Bleech/ chest tightness, itchy throat, swollen tongue, hives     Tizanidine Other (See Comments)     Confusion, back pain, photophobia, abdominal pain, shaking, anxious       Adhesive Tape Rash     Azithromycin Hives and Rash     Cephalexin Itching and Rash     Sulfa Antibiotics Rash     Skin scarring      Current Outpatient Medications   Medication Sig Dispense Refill     amitriptyline (ELAVIL) 25 MG tablet TAKE ONE TABLET BY MOUTH AT BEDTIME 90 tablet 0     apremilast (OTEZLA) 30 MG tablet Take 1 tablet (30 mg) by mouth 2 times daily. Hold for signs of infection, and seek medical attention. 180 tablet 3     azaTHIOprine (IMURAN) 50 MG tablet Take 1 tablet (50 mg) by mouth daily with food. 30 tablet 1     citalopram (CELEXA) 20 MG tablet Take 1 tablet (20 mg) by mouth daily 90 tablet 1     lifitegrast (XIIDRA) 5 % opthalmic solution Place 1 drop into both eyes 2 times daily. 180 each 3     albuterol (PROAIR HFA/PROVENTIL HFA/VENTOLIN HFA) 108 (90 Base) MCG/ACT inhaler Inhale 2 puffs into the lungs every 6 hours as needed for shortness of breath / dyspnea or wheezing 1 Inhaler 1     artificial tears OINT ophthalmic ointment 0.5 inch strip each eye at night 1 Tube 11     azelaic acid (FINACIA) 15 % external gel Once daily to scars, upper chest and small portion of back and spot on belly button. Hold if irritating 50 g 5     benzocaine  (AMERICAINE) 20 % external aerosol Apply to perineum four times daily as needed for pain 57 g 3     benzocaine (TOPICALE XTRA) 20 % GEL Apply 1 g to affected area 4 times daily as needed for mouth sores. 30 g 5     betamethasone valerate (VALISONE) 0.1 % cream Apply topically 2 times daily as needed        bisacodyl (DULCOLAX) 5 MG EC tablet Take 2 tablets (10 mg) by mouth daily as needed for constipation 30 tablet 0     EPINEPHrine (EPIPEN 2-EAMON) 0.3 MG/0.3ML injection Inject 0.3 mLs (0.3 mg) into the muscle as needed for anaphylaxis 0.6 mL 3     etonogestrel (NEXPLANON) 68 MG IMPL Inject 68 mg Subcutaneous       fluocinonide (LIDEX) 0.05 % external gel Apply topically 2 times daily. 60 g 11     gabapentin (NEURONTIN) 300 MG capsule Take 1 capsule (300 mg) by mouth every morning 60 capsule 1     hydroquinone (ARACELY) 4 % external cream Apply once daily for 3 months. Stop for one month. If further lightening is desired, proceed with one additional month of treatment. 28 g 1     hydrOXYzine HCl (ATARAX) 25 MG tablet TAKE ONE OR TWO TABLETS BY MOUTH EVERY SIX HOURS AS NEEDED       lactulose 20 GM/30ML SOLN Take 30 mLs by mouth 3 times daily as needed (for constipation) 300 mL 3     lanolin ointment Apply topically every hour as needed for other (sore nipples) 7 g 3     lidocaine (LMX4) 4 % external cream Apply topically once as needed for mild pain 120 g 1     LINZESS 290 MCG capsule TAKE 1 CAPSULE BY MOUTH EVERY DAY IN THE MORNING BEFORE BREAKFAST 90 capsule 0     mometasone (ELOCON) 0.1 % external ointment Apply topically 2 times daily. 45 g 11     ondansetron (ZOFRAN ODT) 8 MG ODT tab Take 1 tablet (8 mg) by mouth every 8 hours as needed 90 tablet 3     polyethylene glycol (MIRALAX/GLYCOLAX) powder Take 1 capful by mouth 3 times daily       Prenatal MV-Min-Fe Fum-FA-DHA (PRENATAL 1 PO)        rizatriptan (MAXALT-MLT) 5 MG ODT DISSOLVE 1 OR 2 TABLETS IN THE MOUTH AS NEEDED FOR HEADACHE AT ONSET OF MIGRAINE, MAY  REPEAT IN 2 HOURS AS NEEDED. MAX 30MG IN 24 HOURS AND MAX 5 DAYS PER MONTH       Sharps Container MISC Use for methotreaxte shots 1 each 11     sodium fluoride 1.1 % CREA Apply 1 Application topically daily       sucralfate (CARAFATE) 1 GM/10ML suspension Take 10 mLs (1 g) by mouth 4 times daily 1200 mL 2     triamcinolone (KENALOG) 0.1 % external ointment Apply twice daily as needed to lesions on the genitals and body. OK to use very sparingly on the face. 454 g 2     White Petrolatum-Mineral Oil (ARTIFICIAL TEARS) 83-15 % OINT Apply 1 g to eye at bedtime. Apply to each eye at night at bedtime. 3.5 g 11           Physical Exam     GA: NAD, pleasant  HEENT: nl sclera/conj  MSK: Jaccoud arthropathy affecting 4th and 5th fingers, no synovitis  Skin: no rash  Neuro: non focal  Psych: nl affect             Data   Data     10/28/2022   BEAUCHAMP-28 (ESR) --   BEAUCHAMP-28 (CRP) --   Tender (BEAUCHAMP-28) 2 / 28    Swollen (BEAUCHAMP-28) 0 / 28    Provider Global --   Patient Global --   ESR 17 mm/hr   CRP --     No results found for any visits on 03/19/25.  CBC RESULTS: Recent Labs   Lab Test 09/07/20  1147   WBC 4.1   RBC 5.09   HGB 13.0   HCT 43.0   MCV 85   MCH 25.5*   MCHC 30.2*   RDW 15.0        TSH   Date Value Ref Range Status   09/07/2020 0.77 0.40 - 4.00 mU/L Final   03/21/2019 0.53 0.40 - 4.00 mU/L Final   10/30/2018 1.06 0.40 - 4.00 mU/L Final   11/26/2016 0.87 0.40 - 4.00 mU/L Final     T4 Free   Date Value Ref Range Status   01/31/2007 1.26 0.70 - 1.85 ng/dL Final     Rheumatoid Factor   Date Value Ref Range Status   02/06/2024 <10 <14 IU/mL Final   09/30/2020 <7 <12 IU/mL Final   05/06/2016 <20 <20 IU/mL Final     Component      Latest Ref Rng 3/4/2025  11:58 AM   WBC      4.0 - 11.0 10e3/uL 3.8 (L)    RBC Count      3.80 - 5.20 10e6/uL 4.60    Hemoglobin      11.7 - 15.7 g/dL 14.0    Hematocrit      35.0 - 47.0 % 41.9    MCV      78 - 100 fL 91    MCH      26.5 - 33.0 pg 30.4    MCHC      31.5 - 36.5 g/dL 33.4    RDW       10.0 - 15.0 % 12.1    Platelet Count      150 - 450 10e3/uL 193    % Neutrophils      % 64    % Lymphocytes      % 28    % Monocytes      % 6    % Eosinophils      % 2    % Basophils      % 0    % Immature Granulocytes      % 0    Absolute Neutrophils      1.6 - 8.3 10e3/uL 2.4    Absolute Lymphocytes      0.8 - 5.3 10e3/uL 1.1    Absolute Monocytes      0.0 - 1.3 10e3/uL 0.2    Absolute Eosinophils      0.0 - 0.7 10e3/uL 0.1    Absolute Basophils      0.0 - 0.2 10e3/uL 0.0    Absolute Immature Granulocytes      <=0.4 10e3/uL 0.0    Creatinine      0.51 - 0.95 mg/dL 0.68    GFR Estimate      >60 mL/min/1.73m2 >90    Sed Rate      0 - 20 mm/hr 5    CRP Inflammation      <5.00 mg/L <3.00    ALT      0 - 50 U/L 15    Albumin      3.5 - 5.2 g/dL 4.7    AST      0 - 45 U/L 19       Legend:  (L) Low  Reviewed Rheumatology lab flowsheet         Again, thank you for allowing me to participate in the care of your patient.      Sincerely,    Dominga Sosa MD

## 2025-03-20 ENCOUNTER — TELEPHONE (OUTPATIENT)
Dept: RHEUMATOLOGY | Facility: CLINIC | Age: 30
End: 2025-03-20
Payer: COMMERCIAL

## 2025-03-20 NOTE — TELEPHONE ENCOUNTER
PA Initiation    Medication: CIMZIA (2 SYRINGE) 200 MG/ML SC PSKT  Insurance Company: "[employer] - Phone [phone] Fax [phone]  Pharmacy Filling the Rx: [facility] - [place], [place] - [address]  Filling Pharmacy Phone:    Filling Pharmacy Fax:    Start Date: 3/20/2025     [id number]

## 2025-03-24 ENCOUNTER — TELEPHONE (OUTPATIENT)
Dept: RHEUMATOLOGY | Facility: CLINIC | Age: 30
End: 2025-03-24
Payer: COMMERCIAL

## 2025-03-24 ENCOUNTER — MYC MEDICAL ADVICE (OUTPATIENT)
Dept: RHEUMATOLOGY | Facility: CLINIC | Age: 30
End: 2025-03-24
Payer: COMMERCIAL

## 2025-03-24 DIAGNOSIS — M19.90 INFLAMMATORY ARTHRITIS: Primary | ICD-10-CM

## 2025-03-24 NOTE — TELEPHONE ENCOUNTER
MTM referral placed to assist with specialty medication initiation.      Kori TORRE RN  Adult Rheumatology Clinic

## 2025-03-24 NOTE — TELEPHONE ENCOUNTER
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

## 2025-03-24 NOTE — TELEPHONE ENCOUNTER
Faxed additional info to VAHID Lara, [facility]  Specialty Pharmacy Clinic Liaison     [facility]    [email or web address]     Phone: [phone]  Fax: [phone]

## 2025-03-24 NOTE — TELEPHONE ENCOUNTER
Plan is requesting additional information    \      Please advise and we can submit to VAHID Lara, [facility]  Specialty Pharmacy Clinic Liaison     [facility] Special[facility]    [email or web address]     Phone: [phone]  Fax: [phone]

## 2025-03-24 NOTE — TELEPHONE ENCOUNTER
----- Message from STEVE LEACH sent at 3/24/2025  8:58 AM CDT -----  Can you put in an MTM referral and let the patient know?    Thank you!  ----- Message -----  From: Dominga Sosa MD  Sent: 3/19/2025   3:06 PM CDT  To: Steve Leach, [facility]    Fyi    Cimzia for seroneg RA is next plan    Still breatsfeeding

## 2025-03-25 NOTE — TELEPHONE ENCOUNTER
Prior Authorization Approval    Medication: OTEZLA 30 MG PO TABS  Authorization Effective Date: 3/25/2025  Authorization Expiration Date: 3/24/2026  Approved Dose/Quantity: 60  Reference #:     Insurance Company: [employer] - Phone [phone] Fax [phone]  Expected CoPay: $    CoPay Card Available: No    Financial Assistance Needed: NA  Which Pharmacy is filling the prescription: [facility] - [place] [address], [facility]  Specialty Pharmacy Clinic Liaison     [facility] Specialty    [email or web address]     Phone: [phone]  Fax: [phone]

## 2025-03-25 NOTE — TELEPHONE ENCOUNTER
PRIOR AUTHORIZATION DENIED    Medication: CIMZIA (2 SYRINGE) 200 MG/ML SC PSKT  Insurance Company: [employer] - Phone [phone] Fax [phone]  Denial Date: 3/25/2025  Denial Reason(s):       Appeal Information:             Patient Notified: no

## 2025-03-26 NOTE — TELEPHONE ENCOUNTER
Medication Appeal Initiation    Medication: CIMZIA (2 SYRINGE) 200 MG/ML SC PSKT  Appeal Start Date:  3/26/2025  Appeal faxed to [employer] plan      Comments:  [email or web address]  VAHID Ponce, [facility]  Specialty Pharmacy Clinic Liaison     [facility]    [email or web address]     Phone: [phone]  Fax: [phone]

## 2025-03-26 NOTE — TELEPHONE ENCOUNTER
Cam with [employer] calling, they received the appeal. Just following up if there are any additional information for pt's appeal.

## 2025-03-28 ENCOUNTER — VIRTUAL VISIT (OUTPATIENT)
Dept: PHARMACY | Facility: CLINIC | Age: 30
End: 2025-03-28
Attending: INTERNAL MEDICINE
Payer: COMMERCIAL

## 2025-03-28 DIAGNOSIS — M35.2 BEHCET'S DISEASE (H): ICD-10-CM

## 2025-03-28 DIAGNOSIS — M06.09 RHEUMATOID ARTHRITIS OF MULTIPLE SITES WITHOUT RHEUMATOID FACTOR (H): Primary | ICD-10-CM

## 2025-03-28 DIAGNOSIS — M06.00 SERONEGATIVE RHEUMATOID ARTHRITIS (H): ICD-10-CM

## 2025-03-28 DIAGNOSIS — K13.79 RECURRENT ORAL ULCERS: ICD-10-CM

## 2025-03-28 DIAGNOSIS — M79.7 FIBROMYALGIA: ICD-10-CM

## 2025-04-01 ENCOUNTER — TELEPHONE (OUTPATIENT)
Dept: RHEUMATOLOGY | Facility: CLINIC | Age: 30
End: 2025-04-01
Payer: COMMERCIAL

## 2025-04-01 NOTE — PATIENT INSTRUCTIONS
"Recommendations from today's MTM visit:                                                       1. We are working on obtaining insurance coverage for Cimzia. Once this is approved the pharmacy will call you to set up delivery so you can start 400 mg (2 injections) every 14 days x 3 doses then 200 mg (1 injection) every 14 days.     2. A common side effect of Cimzia is injection site reactions (red, raised, itchy spot at injection site). You can use hydrocortisone cream and ice to treat these reactions if they occur.    Follow-up: Return in about 3 months (around 6/28/2025) for MTM Pharmacist Visit.    It was great speaking with you today.  I value your experience and would be very thankful for your time in providing feedback in our clinic survey. In the next few days, you may receive an email or text message from [employer] with a link to a survey related to your  clinical pharmacist.\"     To schedule another MTM appointment, please call the clinic directly or you may call the MTM scheduling line at [phone].    My Clinical Pharmacist's contact information:                                                      Please feel free to contact me with any questions or concerns you have.      Natalie Leach, PharmD  Medication Therapy Management Pharmacist  [facility]  Phone: [phone]     "

## 2025-04-01 NOTE — PROGRESS NOTES
Medication Therapy Management (MTM) Encounter    ASSESSMENT:                            Medication Adherence/Access: No issues identified. PA appeal already submitted.    Behçet's Disease/RA/Fibromyalgia/Recurrent oral/genital ulcers: Due to continued joint pain despite current medication treatment, would benefit from adding Cimzia to current medication regimen. Provided education on Cimzia today including dosing, general administration, side effects (both common/serious), precautions, monitoring and time to efficacy. Discussed data on malignancy and risk of serious infection in depth. Encouraged indicated non-live vaccines and avoidance of live vaccines. Discussed potential need to hold therapy in the setting of signs/symptoms of active infection. Encouraged her to contact the rheumatology clinic in the event she has questions on this. Would benefit from starting 400 mg every 14 days x 3 doses then 200 mg every 14 days once it arrives.     PLAN:                            1. We are working on obtaining insurance coverage for Cimzia. Once this is approved the pharmacy will call you to set up delivery so you can start 400 mg (2 injections) every 14 days x 3 doses then 200 mg (1 injection) every 14 days.     2. A common side effect of Cimzia is injection site reactions (red, raised, itchy spot at injection site). You can use hydrocortisone cream and ice to treat these reactions if they occur.    Follow-up: Return in about 3 months (around 6/28/2025) for MTM Pharmacist Visit.    SUBJECTIVE/OBJECTIVE:                          Samara Oropeza is a 29 year old female seen for a follow-up visit.       Reason for visit: Cimzia education    Allergies/ADRs: Reviewed in chart  Past Medical History: Reviewed in chart  Tobacco: She reports that she has never smoked. She has never been exposed to tobacco smoke. She has never used smokeless tobacco.  Alcohol: Less than 1 beverages / week    Medication Adherence/Access:  Medication barriers: obtaining medication from insurance. Cimzia currently denied by insurance due to insurance not wanting Cimzia and Otezla to be used together. Appeal submitted - waiting on determination.    Behçet's Disease/RA/Fibromyalgia/Recurrent oral/genital ulcers:  Otezla 30 mg twice daily  Mometasone 0.1% topically twice daily as needed  Triamcinolone 0.1% ointment apply topically twice daily as needed  Lidocaine 4% cream topically as needed  Benzocaine 20% gel for mouth ulcers as needed  Gabapentin 300 mg once daily     Still breastfeeding so not using methotrexate. Reports she is continuing to have bothersome joint pain despite current medications - no rashes or active ulcers currently. Rheumatologist recommended trying Cimzia to help with joint pain in conjunction with Otezla to keep Behcets controlled. Would like to review how and when to administer and make sure it does not interact with her migraine treatments.     Pre-Biologic Screening:   Hep B Surface Antibody No record of completion    Hep B Core Antibody  Non-reactive (1/15/25)    Hep B Surface Antigen Non-reactive (1/15/25)    Hep C Antibody  Non-reactive (1/15/25)    HIV Antigen Antibody Non-reactive (11/1/16)    Quantiferon TB Gold Negative (1/15/25)      Last lab monitoring completed: 3/4/25    Today's Vitals: There were no vitals taken for this visit.  ----------------    I spent 25 minutes with this patient today. All changes were made via collaborative practice agreement with Dominga Sosa.     A summary of these recommendations was sent via [employer].    Natalie Leach, PharmD  Medication Therapy Management Pharmacist  [facility]ph[facility]  Phone: [phone]    Telemedicine Visit Details  The patient's medications can be safely assessed via a telemedicine encounter.  Type of service:  Telephone visit  Originating Location (pt. Location): Home    Distant Location (provider location):  On-site  Start  Time: 3:00 PM  End Time: 3:25 PM     Medication Therapy Recommendations  Rheumatoid arthritis of multiple sites without rheumatoid factor (H)   1 Current Medication: certolizumab pegol (CIMZIA) 2 X 200 MG injection 2 vials/kit   Current Medication Sig: Initial: 400 mg, repeat dose 2 and 4 weeks after initial dose; Maintenance: 200 mg every other week   Rationale: Does not understand instructions - Adherence - Adherence   Recommendation: Provide Education   Status: Patient Agreed - Adherence/Education   Identified Date: 3/28/2025 Completed Date: 3/28/2025

## 2025-04-01 NOTE — TELEPHONE ENCOUNTER
PA Initiation    Medication: CIMZIA-STARTER 200 MG/ML SC PSKT  Insurance Company: "[employer] - Phone [phone] Fax [phone]  Pharmacy Filling the Rx:    Filling Pharmacy Phone:    Filling Pharmacy Fax:    Start Date: 4/1/2025    shannan blake ([place]: [id number])    VAHID Ponce, [facility]  [facility]acy [facility]    [email or web address]     Phone: [phone]  Fax: [phone]

## 2025-04-02 ENCOUNTER — OFFICE VISIT (OUTPATIENT)
Dept: OPTOMETRY | Facility: CLINIC | Age: 30
End: 2025-04-02
Payer: COMMERCIAL

## 2025-04-02 DIAGNOSIS — H53.71 GLARE SENSITIVITY: Primary | ICD-10-CM

## 2025-04-02 DIAGNOSIS — H25.043 POSTERIOR SUBCAPSULAR POLAR AGE-RELATED CATARACT OF BOTH EYES: ICD-10-CM

## 2025-04-02 PROBLEM — N39.41 URGE URINARY INCONTINENCE: Status: ACTIVE | Noted: 2023-04-19

## 2025-04-02 PROBLEM — F34.1 DYSTHYMIC DISORDER: Status: ACTIVE | Noted: 2022-10-13

## 2025-04-02 PROBLEM — M65.311 TRIGGER FINGER OF RIGHT THUMB: Status: ACTIVE | Noted: 2022-03-10

## 2025-04-02 PROBLEM — Z87.898 HISTORY OF GINGIVAL BLEEDING: Status: ACTIVE | Noted: 2023-04-26

## 2025-04-02 PROBLEM — R05.9 COUGH: Status: ACTIVE | Noted: 2025-04-02

## 2025-04-02 PROBLEM — D64.9 ANEMIA: Status: ACTIVE | Noted: 2023-02-20

## 2025-04-02 PROBLEM — M65.4 DE QUERVAIN'S TENOSYNOVITIS, RIGHT: Status: ACTIVE | Noted: 2022-03-10

## 2025-04-02 PROBLEM — H93.19 TINNITUS: Status: ACTIVE | Noted: 2025-04-02

## 2025-04-02 PROBLEM — Z86.2 HISTORY OF ANEMIA: Status: ACTIVE | Noted: 2022-08-10

## 2025-04-02 PROBLEM — I10 CHRONIC HYPERTENSION: Status: ACTIVE | Noted: 2023-02-20

## 2025-04-02 PROBLEM — R10.2 VAGINAL PAIN: Status: ACTIVE | Noted: 2023-04-26

## 2025-04-02 PROBLEM — Z86.718 HISTORY OF DVT (DEEP VEIN THROMBOSIS): Status: ACTIVE | Noted: 2022-08-23

## 2025-04-02 PROBLEM — Z87.59 HISTORY OF PRE-ECLAMPSIA: Status: ACTIVE | Noted: 2023-02-20

## 2025-04-02 RX ORDER — BRIMONIDINE TARTRATE 2 MG/ML
1 SOLUTION/ DROPS OPHTHALMIC DAILY PRN
Qty: 5 ML | Refills: 1 | Status: SHIPPED | OUTPATIENT
Start: 2025-04-02

## 2025-04-02 RX ORDER — LORAZEPAM 1 MG/1
1 TABLET ORAL EVERY 8 HOURS PRN
COMMUNITY
Start: 2025-03-12

## 2025-04-02 RX ORDER — CERTOLIZUMAB PEGOL 200 MG/ML
400 INJECTION, SOLUTION SUBCUTANEOUS
Qty: 6 ML | Refills: 0 | OUTPATIENT
Start: 2025-04-02

## 2025-04-02 ASSESSMENT — REFRACTION_WEARINGRX
OS_CYLINDER: SPHERE
SPECS_TYPE: SVL
OS_SPHERE: -2.50
OD_CYLINDER: SPHERE
OD_SPHERE: -2.50

## 2025-04-02 ASSESSMENT — REFRACTION_CURRENTRX
OS_SPHERE: -2.00
OS_BRAND: ONEFIT
OS_BASECURVE: 7.8
OS_SPHERE: -2.50
OD_DIAMETER: 14.8
OD_BASECURVE: 7.7
OD_BASECURVE: 4.1/7.5
OS_DIAMETER: 14.8
OD_BRAND: ONEFIT
OS_BASECURVE: 4.2/7.34
OS_DIAMETER: 14.9
OD_DIAMETER: 14.9
OD_BRAND: ZENLENS RC
OS_BRAND: ZENLENS RC
OD_SPHERE: -2.50
OD_SPHERE: -2.00

## 2025-04-02 ASSESSMENT — VISUAL ACUITY
OS_CC+: -3
OS_CC: 20/20
CORRECTION_TYPE: GLASSES
OD_CC: 20/20
METHOD: SNELLEN - LINEAR

## 2025-04-02 ASSESSMENT — TONOMETRY
OD_IOP_MMHG: 19
OS_IOP_MMHG: 16
IOP_METHOD: ICARE

## 2025-04-02 ASSESSMENT — SLIT LAMP EXAM - LIDS
COMMENTS: NORMAL
COMMENTS: NORMAL

## 2025-04-02 ASSESSMENT — EXTERNAL EXAM - RIGHT EYE: OD_EXAM: NORMAL

## 2025-04-02 ASSESSMENT — EXTERNAL EXAM - LEFT EYE: OS_EXAM: NORMAL

## 2025-04-02 NOTE — NURSING NOTE
Chief Complaints and History of Present Illnesses   Patient presents with    New Eval For Contact Lens     Pt here for new hard lens refraction, referred by Dr Bosch.     Chief Complaint(s) and History of Present Illness(es)       New Eval For Contact Lens              Laterality: both eyes    Comments: Pt here for new hard lens refraction, referred by Dr Bosch.              Comments    Pt has largely unchanged vision since last exam. Blurred vision is mostly with night driving. Pt with hx of soft contact lens use. Wears them for special occasions. Has not worn them while driving.     Pt using:  Xiidra BID each eye   Farhan at bedtime   Not using artificial tears during the day    YADIRA Sheikh on 4/2/2025 at 1:29 PM

## 2025-04-02 NOTE — PROGRESS NOTES
A/P  1.) Glare Sensitivity with PSC cataract OU  -Noting blurred vision night>>day  -Previous soft lens wearer  -Referred for hard lens OR in setting of mild dry eye  -NO IMPROVEMENT in hard lens OR x 2. Hard lens optics actually made vision worse (20/25- OU). There was moderate residual astigmatism. The topographies are normal. In this setting, the intraocular lens is likely inducing blur and when I negate corneal astigmatism more internal astigmatism is exposed which is worsening the vision. She does BAT down both eyes, which endorses this  -Blurred vision is not corneal in nature. She would like to try brimonidine for pupil reducing to help with glare but may further discuss CE/IOL and pro's/con's with Dr. Bosch in the future    No CL recommended. Follow-up with other providers as previous    I have confirmed the patient's CC, HPI and reviewed Past Medical History, Past Surgical History, Social History, Family History, Problem List, Medication List and agree with Tech note.     Vickie Medeiros, OD MELNIAO [facility]

## 2025-04-17 DIAGNOSIS — M79.7 FIBROMYALGIA: ICD-10-CM

## 2025-04-17 DIAGNOSIS — L29.9 PRURITUS: ICD-10-CM

## 2025-04-21 ENCOUNTER — MYC REFILL (OUTPATIENT)
Dept: RHEUMATOLOGY | Facility: CLINIC | Age: 30
End: 2025-04-21
Payer: COMMERCIAL

## 2025-04-21 ENCOUNTER — TELEPHONE (OUTPATIENT)
Dept: DERMATOLOGY | Facility: CLINIC | Age: 30
End: 2025-04-21
Payer: COMMERCIAL

## 2025-04-21 DIAGNOSIS — M79.7 FIBROMYALGIA: ICD-10-CM

## 2025-04-21 DIAGNOSIS — L29.9 PRURITUS: ICD-10-CM

## 2025-04-21 NOTE — TELEPHONE ENCOUNTER
4/21 Patient confirmed scheduled appointment:  Date: 10/23/25  Time: 2:50pm  Visit type: Return Derm Autoimmune  Provider: Alexy  Location: [facility]  Testing/imaging: na  Additional notes: na

## 2025-04-22 NOTE — TELEPHONE ENCOUNTER
Last Written Prescription:  amitriptyline (ELAVIL) 25 MG tablet 90 tablet 0 1/15/2025     ----------------------  Last Visit Date: 3/19/25  Future Visit Date: 6/26/25  ----------------------          Refill decision: Medication unable to be refilled by RN due to: Other:    Not mentioned in last note  Are you managing this medication?    Request from pharmacy:  Requested Prescriptions   Pending Prescriptions Disp Refills    amitriptyline (ELAVIL) 25 MG tablet 90 tablet 0     Sig: Take 1 tablet (25 mg) by mouth.       Tricyclic Agents Passed - 4/22/2025  9:26 AM        Passed - Medication is active on med list and the sig matches. RN to manually verify dose and sig if red X/fail.     If the protocol passes (green check), you do not need to verify med dose and sig.    A prescription matches if they are the same clinical intention.    For Example: once daily and every morning are the same.    The protocol can not identify upper and lower case letters as matching and will fail.     For Example: Take 1 tablet (50 mg) by mouth daily     TAKE 1 TABLET (50 MG) BY MOUTH DAILY    For all fails (red x), verify dose and sig.    If the refill does match what is on file, the RN can still proceed to approve the refill request.       If they do not match, route to the appropriate provider.             Passed - Recent (12 mo) or future (90 days) visit within authorizing provider's specialty     The patient must have completed an in-person or virtual visit within the past 12 months or has a future visit scheduled within the next 90 days with the authorizing provider s specialty.  Urgent care and e-visits do not qualify as an office visit for this protocol.          Passed - Medicaiton indicated for associated diagnosis     Medication is associated with one or more of the following diagnoses:    Alcoholism   Anxiety   Attention deficit hyperactivity disorder   Depression   Fibromyalgia   Headache    Insomnia    Neurogenic  bladder   Neuropathy   Nocturnal enuresis   Pain   Panic Disorder   Smoking cessation   Tinnitus          Passed - Patient is age 18 or older        Passed - Patient is not pregnant        Passed - No positive pregnancy test on record in past 12 mos

## 2025-04-22 NOTE — TELEPHONE ENCOUNTER
Last Written Prescription:     amitriptyline (ELAVIL) 25 MG tablet 90 tablet 0 1/15/2025 -- No   Sig - Route: TAKE ONE TABLET BY MOUTH AT BEDTIME - Oral     ----------------------  Last Visit Date: 3/19/25   Future Visit Date: 6/26/25  ----------------------  Overridden by Dominga Sosa MD on Jun 5, 2024 4:13 PM   Drug-Drug   1. CITALOPRAM / TRICYCLIC ANTIDEPRESSANTS [Level: Major]   Other Orders: citalopram (CELEXA) 20 MG tablet      2. TRICYCLIC ANTIDEPRESSANTS / SYMPATHOMIMETICS [Level: Moderate]   Other Orders: EPINEPHrine (EPIPEN 2-EAMON) 0.3 MG/0.3ML injection          Refill decision: Medication refilled per  Medication Refill in Ambulatory Care  policy.       Request from pharmacy:  Requested Prescriptions   Pending Prescriptions Disp Refills    amitriptyline (ELAVIL) 25 MG tablet [Pharmacy Med Name: Amitriptyline HCl Oral Tablet 25 MG] 90 tablet 0     Sig: TAKE ONE TABLET BY MOUTH AT BEDTIME       Tricyclic Agents Passed - 4/22/2025 11:12 AM        Passed - Medication is active on med list and the sig matches. RN to manually verify dose and sig if red X/fail.     If the protocol passes (green check), you do not need to verify med dose and sig.    A prescription matches if they are the same clinical intention.    For Example: once daily and every morning are the same.    The protocol can not identify upper and lower case letters as matching and will fail.     For Example: Take 1 tablet (50 mg) by mouth daily     TAKE 1 TABLET (50 MG) BY MOUTH DAILY    For all fails (red x), verify dose and sig.    If the refill does match what is on file, the RN can still proceed to approve the refill request.       If they do not match, route to the appropriate provider.             Passed - Recent (12 mo) or future (90 days) visit within authorizing provider's specialty     The patient must have completed an in-person or virtual visit within the past 12 months or has a future visit scheduled within the next 90 days with  the authorizing provider s specialty.  Urgent care and e-visits do not qualify as an office visit for this protocol.          Passed - Medicaiton indicated for associated diagnosis     Medication is associated with one or more of the following diagnoses:    Alcoholism   Anxiety   Attention deficit hyperactivity disorder   Depression   Fibromyalgia   Headache    Insomnia    Neurogenic bladder   Neuropathy   Nocturnal enuresis   Pain   Panic Disorder   Smoking cessation   Tinnitus          Passed - Patient is age 18 or older        Passed - Patient is not pregnant        Passed - No positive pregnancy test on record in past 12 mos

## 2025-04-23 DIAGNOSIS — G43.719 CHRONIC MIGRAINE WITHOUT AURA, INTRACTABLE, WITHOUT STATUS MIGRAINOSUS: Primary | ICD-10-CM

## 2025-04-29 ENCOUNTER — OFFICE VISIT (OUTPATIENT)
Dept: PHYSICAL MEDICINE AND REHAB | Facility: CLINIC | Age: 30
End: 2025-04-29
Payer: COMMERCIAL

## 2025-04-29 VITALS — HEART RATE: 109 BPM | SYSTOLIC BLOOD PRESSURE: 100 MMHG | DIASTOLIC BLOOD PRESSURE: 71 MMHG

## 2025-04-29 DIAGNOSIS — M54.81 BILATERAL OCCIPITAL NEURALGIA: Primary | ICD-10-CM

## 2025-04-29 DIAGNOSIS — G43.719 CHRONIC MIGRAINE WITHOUT AURA, INTRACTABLE, WITHOUT STATUS MIGRAINOSUS: ICD-10-CM

## 2025-04-29 DIAGNOSIS — M79.18 MYOFASCIAL PAIN: ICD-10-CM

## 2025-04-29 PROCEDURE — 3078F DIAST BP <80 MM HG: CPT | Performed by: PHYSICAL MEDICINE & REHABILITATION

## 2025-04-29 PROCEDURE — 64405 NJX AA&/STRD GR OCPL NRV: CPT | Mod: XS | Performed by: PHYSICAL MEDICINE & REHABILITATION

## 2025-04-29 PROCEDURE — 64615 CHEMODENERV MUSC MIGRAINE: CPT | Mod: GC | Performed by: PHYSICAL MEDICINE & REHABILITATION

## 2025-04-29 PROCEDURE — 95874 GUIDE NERV DESTR NEEDLE EMG: CPT | Mod: GC | Performed by: PHYSICAL MEDICINE & REHABILITATION

## 2025-04-29 PROCEDURE — 3074F SYST BP LT 130 MM HG: CPT | Performed by: PHYSICAL MEDICINE & REHABILITATION

## 2025-04-29 PROCEDURE — 20553 NJX 1/MLT TRIGGER POINTS 3/>: CPT | Mod: XS | Performed by: PHYSICAL MEDICINE & REHABILITATION

## 2025-04-29 RX ORDER — TRIAMCINOLONE ACETONIDE 40 MG/ML
40 INJECTION, SUSPENSION INTRA-ARTICULAR; INTRAMUSCULAR ONCE
Status: COMPLETED | OUTPATIENT
Start: 2025-04-29 | End: 2025-04-29

## 2025-04-29 RX ORDER — BUPIVACAINE HYDROCHLORIDE 5 MG/ML
10 INJECTION, SOLUTION PERINEURAL ONCE
Status: COMPLETED | OUTPATIENT
Start: 2025-04-29 | End: 2025-04-29

## 2025-04-29 RX ADMIN — BUPIVACAINE HYDROCHLORIDE 50 MG: 5 INJECTION, SOLUTION PERINEURAL at 12:00

## 2025-04-29 RX ADMIN — TRIAMCINOLONE ACETONIDE 40 MG: 40 INJECTION, SUSPENSION INTRA-ARTICULAR; INTRAMUSCULAR at 12:01

## 2025-04-29 NOTE — LETTER
4/29/2025      Samara Oropeza  [address]  [place] [address]      Dear Colleague,    Thank you for referring your patient, Samara Oropeza, to the [facility]. Please see a copy of my visit note below.      [facility]    PM&R CLINIC NOTE  BOTULINUM TOXIN PROCEDURE      CC: Chronic Migraine Headaches    HPI  Samara Oropeza is a 29 year old female who presents to clinic for botulinum toxin injections for management of chronic migraine headaches.     SINCE LAST VISIT  Samara Oropeza was last seen here in clinic on 2/04/2025, at which time she received 200 units of Botox as well as a nerve block in the greater and lesser occipital nerves.     Patient denies new medical diagnoses, illnesses, hospitalizations, emergency room visits, and injuries since the previous injection with botulinum neurotoxin.     RESPONSE TO PREVIOUS TREATMENT    Side effects: Issues lifting her head off of a pillow was a bit better this time around.     1.  Headache frequency during this injection cycle: She reports ongoing daily migraine before and after the injections and has a migraine today. Had 2 when it was working. This is compared to her baseline headache frequency of 30 headache days per month.     2.  Headache duration during this injection cycle:  Headache duration was usually a few hours to a few days . Patient reports a few episodes of multiple day headaches during this injection cycle, particularly as the Botox was wearing off.     3.  Headache intensity during this injection cycle:    A.  7-8/10  =  Typical pain level.  B.  8/10  =  Worst pain level - this has only occurred over the last two weeks as Botox has been wearing off.   C.  2/10  =  Lowest pain level.    4.  Change in headache medication usage during this injection cycle:  (For Example:  Able to decrease use of oral pain medications.) She has been taking Tylenol  and ibuprofen as needed for her migraine headaches. She will take rizatriptan if the OTC medications do not work. For prevention, she uses Amitripyline     5.  ER Visits During This Injection Cycle:  None.     6.  Functional Performance:  Change in ADL's, social interaction, days lost from work, etc. Patient reports being able to more fully participate in social and family activities and responsibilities as headache symptoms have improved.      PHYSICAL EXAM  VS: There were no vitals taken for this visit.   GEN: Pleasant and cooperative, in no acute distress  HEENT: No facial asymmetry    ALLERGIES  Allergies   Allergen Reactions     Amoxil [Penicillins] Rash     Dad unsure of reaction.     Bee Venom Anaphylaxis     Bioflavonoids Anaphylaxis     Citrus Anaphylaxis     All [place]     Contrast Dye Rash     Contrast Media Ready-Box [facility], 04/09/2014.; Contrast Media Ready-[facility], 04/09/2014.  NOTE: this is a contrast media oral with iodine. Premedicate with methylpred standard for IV contrast, request barium contrast for oral contrast.     Iodinated Contrast Media Hives and Rash     Contrast Media Ready-[facility], 04/09/2014.; Contrast Media Ready-[facility], 04/09/2014.  NOTE: this is a contrast media oral with iodine. Premedicate with methylpred standard for IV contrast, request barium contrast for oral contrast.     Pineapple Anaphylaxis, Difficulty breathing and Rash     Reglan [Metoclopramide] Other (See Comments)     IV dose only, in ER, rapid heart rate.     Ace Inhibitors      Difficulty in breathing and GI upset     Amitiza [Lubiprostone] Nausea and Vomiting     Amoxicillin-Pot Clavulanate      Midazolam Unknown     parent states that when pt takes this medication, she wakes up being very violent .     Midazolam Hcl      Coming out of pelvic exam at age of 5, was kicking and screaming when coming out of the versed.     Other [No Clinical Screening - See Comments]      Bleech/ chest tightness, itchy throat,  swollen tongue, hives     Tizanidine Other (See Comments)     Confusion, back pain, photophobia, abdominal pain, shaking, anxious       Adhesive Tape Rash     Azithromycin Hives and Rash     Cephalexin Itching and Rash     Sulfa Antibiotics Rash     Skin scarring       CURRENT MEDICATIONS    Current Outpatient Medications:      albuterol (PROAIR HFA/PROVENTIL HFA/VENTOLIN HFA) 108 (90 Base) MCG/ACT inhaler, Inhale 2 puffs into the lungs every 6 hours as needed for shortness of breath / dyspnea or wheezing, Disp: 1 Inhaler, Rfl: 1     amitriptyline (ELAVIL) 25 MG tablet, TAKE ONE TABLET BY MOUTH AT BEDTIME, Disp: 90 tablet, Rfl: 1     amitriptyline (ELAVIL) 25 MG tablet, Take 1 tablet (25 mg) by mouth at bedtime., Disp: 90 tablet, Rfl: 1     apremilast (OTEZLA) 30 MG tablet, Take 1 tablet (30 mg) by mouth 2 times daily. Hold for signs of infection, and seek medical attention., Disp: 180 tablet, Rfl: 3     artificial tears OINT ophthalmic ointment, 0.5 inch strip each eye at night, Disp: 1 Tube, Rfl: 11     augmented betamethasone dipropionate (DIPROLENE-AF) 0.05 % external ointment, Apply topically 2 times daily., Disp: 50 g, Rfl: 3     azelaic acid (FINACIA) 15 % external gel, Apply topically at bedtime., Disp: 50 g, Rfl: 11     azelaic acid (FINACIA) 15 % external gel, Once daily to scars, upper chest and small portion of back and spot on belly button. Hold if irritating, Disp: 50 g, Rfl: 5     benzocaine (AMERICAINE) 20 % external aerosol, Apply to perineum four times daily as needed for pain, Disp: 57 g, Rfl: 3     benzocaine (TOPICALE XTRA) 20 % GEL, Apply 1 g to affected area 4 times daily as needed for mouth sores., Disp: 30 g, Rfl: 5     betamethasone valerate (VALISONE) 0.1 % cream, Apply topically 2 times daily as needed , Disp: , Rfl:      bisacodyl (DULCOLAX) 5 MG EC tablet, Take 2 tablets (10 mg) by mouth daily as needed for constipation, Disp: 30 tablet, Rfl: 0     brimonidine (ALPHAGAN) 0.2 %  ophthalmic solution, Place 1 drop into both eyes daily as needed (For reduced glare with nighttime driving)., Disp: 5 mL, Rfl: 1     certolizumab pegol (CIMZIA) 2 X 200 MG injection 2 vials/kit, Inject 1 mL (200 mg) subcutaneously every 14 days., Disp: 2 each, Rfl: 5     certolizumab pegol (CIMZIA-STARTER) 200 MG/ML PSKT 2 syringes/kit, Inject 2 mLs (400 mg) subcutaneously every 14 days., Disp: 6 mL, Rfl: 0     citalopram (CELEXA) 20 MG tablet, Take 1 tablet (20 mg) by mouth daily, Disp: 90 tablet, Rfl: 1     EPINEPHrine (EPIPEN 2-EAMON) 0.3 MG/0.3ML injection, Inject 0.3 mLs (0.3 mg) into the muscle as needed for anaphylaxis, Disp: 0.6 mL, Rfl: 3     etonogestrel (NEXPLANON) 68 MG IMPL, Inject 68 mg Subcutaneous, Disp: , Rfl:      fluocinonide (LIDEX) 0.05 % external gel, Apply topically 2 times daily. Intra-oral, Disp: 60 g, Rfl: 11     fluocinonide (LIDEX) 0.05 % external gel, Apply topically 2 times daily., Disp: 60 g, Rfl: 11     gabapentin (NEURONTIN) 300 MG capsule, Take 1 capsule (300 mg) by mouth every morning, Disp: 60 capsule, Rfl: 1     hydroquinone (ARACELY) 4 % external cream, Apply once daily for 3 months. Stop for one month. If further lightening is desired, proceed with one additional month of treatment., Disp: 28 g, Rfl: 1     hydrOXYzine HCl (ATARAX) 25 MG tablet, TAKE ONE OR TWO TABLETS BY MOUTH EVERY SIX HOURS AS NEEDED, Disp: , Rfl:      lactulose 20 GM/30ML SOLN, Take 30 mLs by mouth 3 times daily as needed (for constipation), Disp: 300 mL, Rfl: 3     lanolin ointment, Apply topically every hour as needed for other (sore nipples), Disp: 7 g, Rfl: 3     lidocaine (LMX4) 4 % external cream, Apply topically once as needed for mild pain, Disp: 120 g, Rfl: 1     lifitegrast (XIIDRA) 5 % opthalmic solution, Place 1 drop into both eyes 2 times daily., Disp: 180 each, Rfl: 3     LINZESS 290 MCG capsule, TAKE 1 CAPSULE BY MOUTH EVERY DAY IN THE MORNING BEFORE BREAKFAST, Disp: 90 capsule, Rfl: 0      LORazepam (ATIVAN) 1 MG tablet, Take 1 tablet by mouth every 8 hours as needed., Disp: , Rfl:      mometasone (ELOCON) 0.1 % external ointment, Apply topically 2 times daily., Disp: 45 g, Rfl: 11     ondansetron (ZOFRAN ODT) 8 MG ODT tab, Take 1 tablet (8 mg) by mouth every 8 hours as needed, Disp: 90 tablet, Rfl: 3     polyethylene glycol (MIRALAX/GLYCOLAX) powder, Take 1 capful by mouth 3 times daily, Disp: , Rfl:      predniSONE (DELTASONE) 5 MG tablet, 15-10-5 mg a day, each course x  5 days then stop as needed for flare ups, Disp: 30 tablet, Rfl: 2     Prenatal MV-Min-Fe Fum-FA-DHA (PRENATAL 1 PO), , Disp: , Rfl:      rizatriptan (MAXALT-MLT) 5 MG ODT, DISSOLVE 1 OR 2 TABLETS IN THE MOUTH AS NEEDED FOR HEADACHE AT ONSET OF MIGRAINE, MAY REPEAT IN 2 HOURS AS NEEDED. MAX 30MG IN 24 HOURS AND MAX 5 DAYS PER MONTH, Disp: , Rfl:      Sharps Container MISC, Use for methotreaxte shots, Disp: 1 each, Rfl: 11     sodium fluoride 1.1 % CREA, Apply 1 Application topically daily, Disp: , Rfl:      sucralfate (CARAFATE) 1 GM/10ML suspension, Take 10 mLs (1 g) by mouth 4 times daily, Disp: 1200 mL, Rfl: 2     triamcinolone (KENALOG) 0.1 % external ointment, Apply twice daily as needed to lesions on the genitals and body. OK to use very sparingly on the face., Disp: 454 g, Rfl: 2     White Petrolatum-Mineral Oil (ARTIFICIAL TEARS) 83-15 % OINT, Apply 1 g to eye at bedtime. Apply to each eye at night at bedtime., Disp: 3.5 g, Rfl: 11    Current Facility-Administered Medications:      botulinum toxin type A (BOTOX) 100 units injection 200 Units, 200 Units, Intramuscular, Q90 Days, Standal, Alejandrina Vera MD       BOTULINUM NEUROTOXIN INJECTION PROCEDURES    VERIFICATION OF PATIENT IDENTIFICATION AND PROCEDURE     Initials   Patient Name SES   Patient  SES   Procedure Verified by: SES     Prior to the start of the procedure and with procedural staff participation, I verbally confirmed the patient s identity using two  indicators, relevant allergies, that the procedure was appropriate and matched the consent or emergent situation, and that the correct equipment/implants were available. Immediately prior to starting the procedure I conducted the Time Out with the procedural staff and re-confirmed the patient s name, procedure, and site/side. (The Joint Commission universal protocol was followed.)  Yes    Sedation (Moderate or Deep): None    ABOVE ASSESSMENTS PERFORMED BY    Alejandrina Hernandez MD    INDICATIONS FOR PROCEDURES  Samara Oropeza is a 29 year old patient with pain secondary to the diagnosis of chronic migraine headaches. Her baseline symptoms have been recalcitrant to oral medications and conservative therapy.  She is here today for reinjection with Botox.    GOAL OF PROCEDURE  The goal of this procedure is to decrease pain.      TOTAL DOSE ADMINISTERED  Dose Administered:  200 units  Botox (Botulinum Toxin Type A)       2:1 Dilution   Unavoidable Drug Waste: No.  Diluent Used:  Preservative Free Normal Saline  Total Volume of Diluent Used:  4 ml  NDC #: Botox 100u ([id number])      CONSENT  The risks, benefits, and treatment options were discussed with Samara Oropeza and she agreed to proceed.    Written consent was obtained by  [facility] .     EQUIPMENT USED  Needle-25mm stimulating/recording  Needle-30 gauge  EMG/NCS Machine    SKIN PREPARATION  Skin preparation was performed using an alcohol wipe.    GUIDANCE DESCRIPTION  Electro-myographic guidance was necessary throughout the neck portion of the procedure to accurately identify all areas of spastic muscles while avoiding injection of non-spastic muscles, neighboring nerves and nearby vascular structures.       AREA/MUSCLE INJECTED:  200 UNITS BOTOX = TOTAL DOSE, 2:1 DILUTION     1. SHOULDER GIRDLE & NECK MUSCLES: 30 UNITS BOTOX = TOTAL DOSE    Right Levator Scapulae - 5 units of Botox over 2 site/s.   Left Levator Scapulae - 5 units of Botox over 1  site/s.    Right Sternocleidomastoid - 2.5 units of Botox over 1 site/s.   Left Sternocleidomastoid - 2.5 units of Botox over 1 site/s.     Right Anterior Scalene - 5 units of Botox over 1 site/s    Right Rhomboid Major - 2.5 units of Botox over 1 site/s.    Left Rhomboid Major - 2.5 units of Botox over 1 site/s.      Right Pectoralis Minor - 2.5 units of Botox over 1 site/s.    Left Pectoralis Minor - 2.5 units of Botox over 1 site/s.     2. FACIAL & SCALP MUSCLES: 170 UNITS BOTOX = TOTAL DOSE     Right Occipitalis - 20 units of Botox over 2 site/s.   Left Occipitalis - 20 units of Botox over 2 site/s.     Right Frontalis - 10 units of Botox over 2 site/s.  Left Frontalis - 10 units of Botox over 2 site/s.     Right Temporalis - 50 units of Botox over 8 site/s.  Left Temporalis - 50 units of Botox over 8 site/s.     Right  - 2.5 units of Botox over 1 site/s.              Left  - 2.5 units of Botox over 1 site/s.                 Procerus - 5 units of Botox at 1 site/s.      BILATERAL GREATER & LESSER OCCIPITAL NERVE BLOCKS, TRIGGER POINT INJECTIONS  1% lidocaine: 2 ml  0.5% bupivacaine: 10 ml  Kenalo ml = 40 mg    ONB: Area just inferior to insertion of the right and left superior trapezius insertion onto skull was cleansed with ChloraPrep. Needle was advanced anteriorly to base of skull then slightly withdrawn and injectate was injected in a fan-like distribution at different depths. An 8 ml mixture of 1% lidocaine, 0.5% bupivacaine and Kenalog was divided into two 5 ml syringes. Total injection volume = 4 ml per side.     TPI: Areas of the skin were prepped with ChloraPrep.  Using standard precautions, a 30-gauge 1-inch needle was used to inject a 3 ml mixture of 1% lidocaine and 0.5% bupivacaine into the upper trapezius and rhomboid major/minor muscles bilaterally for a total of 8 sites.         RESPONSE TO PROCEDURE  Clayton YNES Sandip tolerated the procedure well and there were no  immediate complications. She was allowed to recover for an appropriate period of time and was discharged home in stable condition.      ASSESSMENT AND PLAN   Botulinum toxin injections: No changes to the botox injections today. Occipital nerve blocks and trigger point injections also administered per patient request to address occipital neuralgia and myofascial pain. Patient will continue to monitor response to Botox and report at next appointment.   Referrals: None.   Medications: No changes.   Follow up: Samara Oropeza was rescheduled for the next series of injections in 12 weeks, at which time we will evaluate response to today's injections. she may call the clinic prior with any questions or concerns prior to the next appointment.     Patient seen and discussed with Dr. Mary Quispe MD  Neuromodulation/Movement Disorders Fellow      I, Alejandrina Hernandez MD, saw this patient with movement disorder fellow, Dr. Quispe, and agree with the findings and plan of care as documented in this note. I personally reviewed the chart (vitals signs, medications, labs and imaging). My key findings and key management decisions made are reflected in this note.  I was present for the entire procedure listed above.      Alejandrina Hernandez MD            Again, thank you for allowing me to participate in the care of your patient.        Sincerely,        Alejandrina Hernandez MD    Electronically signed

## 2025-04-29 NOTE — PROGRESS NOTES
[facility]    PM&R CLINIC NOTE  BOTULINUM TOXIN PROCEDURE      CC: Chronic Migraine Headaches    HPI  Samara Oropeza is a 29 year old female who presents to clinic for botulinum toxin injections for management of chronic migraine headaches.     SINCE LAST VISIT  Samara Oropeza was last seen here in clinic on 2/04/2025, at which time she received 200 units of Botox as well as a nerve block in the greater and lesser occipital nerves.     Patient denies new medical diagnoses, illnesses, hospitalizations, emergency room visits, and injuries since the previous injection with botulinum neurotoxin.     RESPONSE TO PREVIOUS TREATMENT    Side effects: Issues lifting her head off of a pillow was a bit better this time around.     1.  Headache frequency during this injection cycle: She reports ongoing daily migraine before and after the injections and has a migraine today. Had 2 when it was working. This is compared to her baseline headache frequency of 30 headache days per month.     2.  Headache duration during this injection cycle:  Headache duration was usually a few hours to a few days . Patient reports a few episodes of multiple day headaches during this injection cycle, particularly as the Botox was wearing off.     3.  Headache intensity during this injection cycle:    A.  7-8/10  =  Typical pain level.  B.  8/10  =  Worst pain level - this has only occurred over the last two weeks as Botox has been wearing off.   C.  2/10  =  Lowest pain level.    4.  Change in headache medication usage during this injection cycle:  (For Example:  Able to decrease use of oral pain medications.) She has been taking Tylenol and ibuprofen as needed for her migraine headaches. She will take rizatriptan if the OTC medications do not work. For prevention, she uses Amitripyline     5.  ER Visits During This Injection Cycle:  None.     6.  Functional Performance:  Change in ADL's, social interaction, days  lost from work, etc. Patient reports being able to more fully participate in social and family activities and responsibilities as headache symptoms have improved.      PHYSICAL EXAM  VS: There were no vitals taken for this visit.   GEN: Pleasant and cooperative, in no acute distress  HEENT: No facial asymmetry    ALLERGIES  Allergies   Allergen Reactions    Amoxil [Penicillins] Rash     Dad unsure of reaction.    Bee Venom Anaphylaxis    Bioflavonoids Anaphylaxis    Citrus Anaphylaxis     All Briscoe    Contrast Dye Rash     Contrast Media [facility], 04/09/2014.; Contrast Media [facility], 04/09/2014.  NOTE: this is a contrast media oral with iodine. Premedicate with methylpred standard for IV contrast, request barium contrast for oral contrast.    Iodinated Contrast Media Hives and Rash     Contrast Media [facility], 04/09/2014.; Contrast Media [facility], 04/09/2014.  NOTE: this is a contrast media oral with iodine. Premedicate with methylpred standard for IV contrast, request barium contrast for oral contrast.    Pineapple Anaphylaxis, Difficulty breathing and Rash    Reglan [Metoclopramide] Other (See Comments)     IV dose only, in ER, rapid heart rate.    Ace Inhibitors      Difficulty in breathing and GI upset    Amitiza [Lubiprostone] Nausea and Vomiting    Amoxicillin-Pot Clavulanate     Midazolam Unknown     parent states that when pt takes this medication, she wakes up being very violent .    Midazolam Hcl      Coming out of pelvic exam at age of 5, was kicking and screaming when coming out of the versed.    Other [No Clinical Screening - See Comments]      Bleech/ chest tightness, itchy throat, swollen tongue, hives    Tizanidine Other (See Comments)     Confusion, back pain, photophobia, abdominal pain, shaking, anxious      Adhesive Tape Rash    Azithromycin Hives and Rash    Cephalexin Itching and Rash    Sulfa Antibiotics Rash     Skin scarring       CURRENT MEDICATIONS    Current  Outpatient Medications:     albuterol (PROAIR HFA/PROVENTIL HFA/VENTOLIN HFA) 108 (90 Base) MCG/ACT inhaler, Inhale 2 puffs into the lungs every 6 hours as needed for shortness of breath / dyspnea or wheezing, Disp: 1 Inhaler, Rfl: 1    amitriptyline (ELAVIL) 25 MG tablet, TAKE ONE TABLET BY MOUTH AT BEDTIME, Disp: 90 tablet, Rfl: 1    amitriptyline (ELAVIL) 25 MG tablet, Take 1 tablet (25 mg) by mouth at bedtime., Disp: 90 tablet, Rfl: 1    apremilast (OTEZLA) 30 MG tablet, Take 1 tablet (30 mg) by mouth 2 times daily. Hold for signs of infection, and seek medical attention., Disp: 180 tablet, Rfl: 3    artificial tears OINT ophthalmic ointment, 0.5 inch strip each eye at night, Disp: 1 Tube, Rfl: 11    augmented betamethasone dipropionate (DIPROLENE-AF) 0.05 % external ointment, Apply topically 2 times daily., Disp: 50 g, Rfl: 3    azelaic acid (FINACIA) 15 % external gel, Apply topically at bedtime., Disp: 50 g, Rfl: 11    azelaic acid (FINACIA) 15 % external gel, Once daily to scars, upper chest and small portion of back and spot on belly button. Hold if irritating, Disp: 50 g, Rfl: 5    benzocaine (AMERICAINE) 20 % external aerosol, Apply to perineum four times daily as needed for pain, Disp: 57 g, Rfl: 3    benzocaine (TOPICALE XTRA) 20 % GEL, Apply 1 g to affected area 4 times daily as needed for mouth sores., Disp: 30 g, Rfl: 5    betamethasone valerate (VALISONE) 0.1 % cream, Apply topically 2 times daily as needed , Disp: , Rfl:     bisacodyl (DULCOLAX) 5 MG EC tablet, Take 2 tablets (10 mg) by mouth daily as needed for constipation, Disp: 30 tablet, Rfl: 0    brimonidine (ALPHAGAN) 0.2 % ophthalmic solution, Place 1 drop into both eyes daily as needed (For reduced glare with nighttime driving)., Disp: 5 mL, Rfl: 1    certolizumab pegol (CIMZIA) 2 X 200 MG injection 2 vials/kit, Inject 1 mL (200 mg) subcutaneously every 14 days., Disp: 2 each, Rfl: 5    certolizumab pegol (CIMZIA-STARTER) 200 MG/ML PSKT 2  syringes/kit, Inject 2 mLs (400 mg) subcutaneously every 14 days., Disp: 6 mL, Rfl: 0    citalopram (CELEXA) 20 MG tablet, Take 1 tablet (20 mg) by mouth daily, Disp: 90 tablet, Rfl: 1    EPINEPHrine (EPIPEN 2-EAMON) 0.3 MG/0.3ML injection, Inject 0.3 mLs (0.3 mg) into the muscle as needed for anaphylaxis, Disp: 0.6 mL, Rfl: 3    etonogestrel (NEXPLANON) 68 MG IMPL, Inject 68 mg Subcutaneous, Disp: , Rfl:     fluocinonide (LIDEX) 0.05 % external gel, Apply topically 2 times daily. Intra-oral, Disp: 60 g, Rfl: 11    fluocinonide (LIDEX) 0.05 % external gel, Apply topically 2 times daily., Disp: 60 g, Rfl: 11    gabapentin (NEURONTIN) 300 MG capsule, Take 1 capsule (300 mg) by mouth every morning, Disp: 60 capsule, Rfl: 1    hydroquinone (ARACELY) 4 % external cream, Apply once daily for 3 months. Stop for one month. If further lightening is desired, proceed with one additional month of treatment., Disp: 28 g, Rfl: 1    hydrOXYzine HCl (ATARAX) 25 MG tablet, TAKE ONE OR TWO TABLETS BY MOUTH EVERY SIX HOURS AS NEEDED, Disp: , Rfl:     lactulose 20 GM/30ML SOLN, Take 30 mLs by mouth 3 times daily as needed (for constipation), Disp: 300 mL, Rfl: 3    lanolin ointment, Apply topically every hour as needed for other (sore nipples), Disp: 7 g, Rfl: 3    lidocaine (LMX4) 4 % external cream, Apply topically once as needed for mild pain, Disp: 120 g, Rfl: 1    lifitegrast (XIIDRA) 5 % opthalmic solution, Place 1 drop into both eyes 2 times daily., Disp: 180 each, Rfl: 3    LINZESS 290 MCG capsule, TAKE 1 CAPSULE BY MOUTH EVERY DAY IN THE MORNING BEFORE BREAKFAST, Disp: 90 capsule, Rfl: 0    LORazepam (ATIVAN) 1 MG tablet, Take 1 tablet by mouth every 8 hours as needed., Disp: , Rfl:     mometasone (ELOCON) 0.1 % external ointment, Apply topically 2 times daily., Disp: 45 g, Rfl: 11    ondansetron (ZOFRAN ODT) 8 MG ODT tab, Take 1 tablet (8 mg) by mouth every 8 hours as needed, Disp: 90 tablet, Rfl: 3    polyethylene glycol  (MIRALAX/GLYCOLAX) powder, Take 1 capful by mouth 3 times daily, Disp: , Rfl:     predniSONE (DELTASONE) 5 MG tablet, 15-10-5 mg a day, each course x  5 days then stop as needed for flare ups, Disp: 30 tablet, Rfl: 2    Prenatal MV-Min-Fe Fum-FA-DHA (PRENATAL 1 PO), , Disp: , Rfl:     rizatriptan (MAXALT-MLT) 5 MG ODT, DISSOLVE 1 OR 2 TABLETS IN THE MOUTH AS NEEDED FOR HEADACHE AT ONSET OF MIGRAINE, MAY REPEAT IN 2 HOURS AS NEEDED. MAX 30MG IN 24 HOURS AND MAX 5 DAYS PER MONTH, Disp: , Rfl:     Sharps Container MISC, Use for methotreaxte shots, Disp: 1 each, Rfl: 11    sodium fluoride 1.1 % CREA, Apply 1 Application topically daily, Disp: , Rfl:     sucralfate (CARAFATE) 1 GM/10ML suspension, Take 10 mLs (1 g) by mouth 4 times daily, Disp: 1200 mL, Rfl: 2    triamcinolone (KENALOG) 0.1 % external ointment, Apply twice daily as needed to lesions on the genitals and body. OK to use very sparingly on the face., Disp: 454 g, Rfl: 2    White Petrolatum-Mineral Oil (ARTIFICIAL TEARS) 83-15 % OINT, Apply 1 g to eye at bedtime. Apply to each eye at night at bedtime., Disp: 3.5 g, Rfl: 11    Current Facility-Administered Medications:     botulinum toxin type A (BOTOX) 100 units injection 200 Units, 200 Units, Intramuscular, Q90 Days, StandAlejandrina bullard MD       BOTULINUM NEUROTOXIN INJECTION PROCEDURES    VERIFICATION OF PATIENT IDENTIFICATION AND PROCEDURE     Initials   Patient Name SES   Patient  SES   Procedure Verified by: SES     Prior to the start of the procedure and with procedural staff participation, I verbally confirmed the patient s identity using two indicators, relevant allergies, that the procedure was appropriate and matched the consent or emergent situation, and that the correct equipment/implants were available. Immediately prior to starting the procedure I conducted the Time Out with the procedural staff and re-confirmed the patient s name, procedure, and site/side. (The Joint Commission  universal protocol was followed.)  Yes    Sedation (Moderate or Deep): None    ABOVE ASSESSMENTS PERFORMED BY    Alejandrina Hernandez MD    INDICATIONS FOR PROCEDURES  Samara Oropeza is a 29 year old patient with pain secondary to the diagnosis of chronic migraine headaches. Her baseline symptoms have been recalcitrant to oral medications and conservative therapy.  She is here today for reinjection with Botox.    GOAL OF PROCEDURE  The goal of this procedure is to decrease pain.      TOTAL DOSE ADMINISTERED  Dose Administered:  200 units  Botox (Botulinum Toxin Type A)       2:1 Dilution   Unavoidable Drug Waste: No.  Diluent Used:  Preservative Free Normal Saline  Total Volume of Diluent Used:  4 ml  NDC #: Botox 100u ([address]-46)      CONSENT  The risks, benefits, and treatment options were discussed with Samara Oropeza and she agreed to proceed.    Written consent was obtained by  [facility] .     EQUIPMENT USED  Needle-25mm stimulating/recording  Needle-30 gauge  EMG/NCS Machine    SKIN PREPARATION  Skin preparation was performed using an alcohol wipe.    GUIDANCE DESCRIPTION  Electro-myographic guidance was necessary throughout the neck portion of the procedure to accurately identify all areas of spastic muscles while avoiding injection of non-spastic muscles, neighboring nerves and nearby vascular structures.       AREA/MUSCLE INJECTED:  200 UNITS BOTOX = TOTAL DOSE, 2:1 DILUTION     1. SHOULDER GIRDLE & NECK MUSCLES: 30 UNITS BOTOX = TOTAL DOSE    Right Levator Scapulae - 5 units of Botox over 2 site/s.   Left Levator Scapulae - 5 units of Botox over 1 site/s.    Right Sternocleidomastoid - 2.5 units of Botox over 1 site/s.   Left Sternocleidomastoid - 2.5 units of Botox over 1 site/s.     Right Anterior Scalene - 5 units of Botox over 1 site/s    Right Rhomboid Major - 2.5 units of Botox over 1 site/s.    Left Rhomboid Major - 2.5 units of Botox over 1 site/s.      Right Pectoralis Minor - 2.5 units of  Botox over 1 site/s.    Left Pectoralis Minor - 2.5 units of Botox over 1 site/s.     2. FACIAL & SCALP MUSCLES: 170 UNITS BOTOX = TOTAL DOSE     Right Occipitalis - 20 units of Botox over 2 site/s.   Left Occipitalis - 20 units of Botox over 2 site/s.     Right Frontalis - 10 units of Botox over 2 site/s.  Left Frontalis - 10 units of Botox over 2 site/s.     Right Temporalis - 50 units of Botox over 8 site/s.  Left Temporalis - 50 units of Botox over 8 site/s.     Right  - 2.5 units of Botox over 1 site/s.              Left  - 2.5 units of Botox over 1 site/s.                 Procerus - 5 units of Botox at 1 site/s.      BILATERAL GREATER & LESSER OCCIPITAL NERVE BLOCKS, TRIGGER POINT INJECTIONS  1% lidocaine: 2 ml  0.5% bupivacaine: 10 ml  Kenalo ml = 40 mg    ONB: Area just inferior to insertion of the right and left superior trapezius insertion onto skull was cleansed with ChloraPrep. Needle was advanced anteriorly to base of skull then slightly withdrawn and injectate was injected in a fan-like distribution at different depths. An 8 ml mixture of 1% lidocaine, 0.5% bupivacaine and Kenalog was divided into two 5 ml syringes. Total injection volume = 4 ml per side.     TPI: Areas of the skin were prepped with ChloraPrep.  Using standard precautions, a 30-gauge 1-inch needle was used to inject a 3 ml mixture of 1% lidocaine and 0.5% bupivacaine into the upper trapezius and rhomboid major/minor muscles bilaterally for a total of 8 sites.         RESPONSE TO PROCEDURE  Samara Oropeza tolerated the procedure well and there were no immediate complications. She was allowed to recover for an appropriate period of time and was discharged home in stable condition.      ASSESSMENT AND PLAN   Botulinum toxin injections: No changes to the botox injections today. Occipital nerve blocks and trigger point injections also administered per patient request to address occipital neuralgia and myofascial  pain. Patient will continue to monitor response to Botox and report at next appointment.   Referrals: None.   Medications: No changes.   Follow up: Samara Oropeza was rescheduled for the next series of injections in 12 weeks, at which time we will evaluate response to today's injections. she may call the clinic prior with any questions or concerns prior to the next appointment.     Patient seen and discussed with Dr. Mary Quispe MD  Neuromodulation/Movement Disorders Fellow      I, Alejandrina Hernandez MD, saw this patient with movement disorder fellow, Dr. Quispe, and agree with the findings and plan of care as documented in this note. I personally reviewed the chart (vitals signs, medications, labs and imaging). My key findings and key management decisions made are reflected in this note.  I was present for the entire procedure listed above.      Alejandrina Hernandez MD

## 2025-06-06 ENCOUNTER — VIRTUAL VISIT (OUTPATIENT)
Dept: PHARMACY | Facility: CLINIC | Age: 30
End: 2025-06-06
Attending: DERMATOLOGY
Payer: COMMERCIAL

## 2025-06-06 DIAGNOSIS — K13.79 RECURRENT ORAL ULCERS: ICD-10-CM

## 2025-06-06 DIAGNOSIS — M35.2 BEHCET'S DISEASE (H): ICD-10-CM

## 2025-06-06 DIAGNOSIS — M06.09 RHEUMATOID ARTHRITIS OF MULTIPLE SITES WITHOUT RHEUMATOID FACTOR (H): Primary | ICD-10-CM

## 2025-06-06 RX ORDER — ACETAMINOPHEN 325 MG/1
650 TABLET ORAL ONCE
Start: 2025-06-20 | End: 2025-06-20

## 2025-06-06 RX ORDER — HEPARIN SODIUM,PORCINE 10 UNIT/ML
5-20 VIAL (ML) INTRAVENOUS DAILY PRN
OUTPATIENT
Start: 2025-06-20

## 2025-06-06 RX ORDER — DIPHENHYDRAMINE HCL 25 MG
25 CAPSULE ORAL ONCE
Start: 2025-06-20 | End: 2025-06-20

## 2025-06-06 RX ORDER — HEPARIN SODIUM (PORCINE) LOCK FLUSH IV SOLN 100 UNIT/ML 100 UNIT/ML
5 SOLUTION INTRAVENOUS
OUTPATIENT
Start: 2025-06-20

## 2025-06-06 RX ORDER — METHYLPREDNISOLONE SODIUM SUCCINATE 40 MG/ML
40 INJECTION INTRAMUSCULAR; INTRAVENOUS
Start: 2025-06-20

## 2025-06-06 RX ORDER — EPINEPHRINE 1 MG/ML
0.3 INJECTION, SOLUTION, CONCENTRATE INTRAVENOUS EVERY 5 MIN PRN
OUTPATIENT
Start: 2025-06-20

## 2025-06-06 RX ORDER — DIPHENHYDRAMINE HYDROCHLORIDE 50 MG/ML
25 INJECTION, SOLUTION INTRAMUSCULAR; INTRAVENOUS
Start: 2025-06-20

## 2025-06-06 RX ORDER — ALBUTEROL SULFATE 90 UG/1
1-2 INHALANT RESPIRATORY (INHALATION)
Start: 2025-06-20

## 2025-06-06 RX ORDER — FOLIC ACID 1 MG/1
1 TABLET ORAL DAILY
Qty: 90 TABLET | Refills: 3 | Status: SHIPPED | OUTPATIENT
Start: 2025-06-06

## 2025-06-06 RX ORDER — DIPHENHYDRAMINE HYDROCHLORIDE 50 MG/ML
50 INJECTION, SOLUTION INTRAMUSCULAR; INTRAVENOUS
Start: 2025-06-20

## 2025-06-06 RX ORDER — METHYLPREDNISOLONE SODIUM SUCCINATE 125 MG/2ML
125 INJECTION INTRAMUSCULAR; INTRAVENOUS ONCE
OUTPATIENT
Start: 2025-06-20 | End: 2025-06-20

## 2025-06-06 RX ORDER — ALBUTEROL SULFATE 0.83 MG/ML
2.5 SOLUTION RESPIRATORY (INHALATION)
OUTPATIENT
Start: 2025-06-20

## 2025-06-06 NOTE — PROGRESS NOTES
Medication Therapy Management (MTM) Encounter    ASSESSMENT:                            Medication Adherence/Access: Due to insurance rejection of Cimzia, will plan to move forward with alternate treatment plan of infliximab, methotrexate, and Otezla. See below for details.    Behçet's Disease/RA/Recurrent oral/genital ulcers: Provided education on infliximab and methotrexate today including dosing, general administration, side effects (both common/serious), precautions, monitoring and time to efficacy. Discussed data on malignancy and risk of serious infection in depth. Encouraged indicated non-live vaccines and avoidance of live vaccines. Discussed potential need to hold therapy in the setting of signs/symptoms of active infection. Encouraged her to contact the rheumatology clinic in the event she has questions on this. Reviewed infusion scheduling, securing, and expectations for day of infusion. Provided preferred scheduling phone numbers. Set up new prescriptions for methotrexate and folic acid, and sent to preferred pharmacy. Emphasized importance of contacting clinic and stopping methotrexate prior to trying for another child. Will plan for MTM follow up in 4 weeks to coordinate needed labs.    PLAN:                            1.  methotrexate vials, folic acid, and needles/syringes from [email or web address] pharmacy and start methotrexate 10 mg (0.4 mL) weekly and folic acid 1 mg daily.    2. Please call the [facility] infusion center at ([phone]) to schedule your infusion appointment in a week if scheduling has not contacted you. We will plan on you starting infliximab infusions on week 0, 2, and 6 then every 8 weeks.    Follow-up: Return in 5 weeks (on 7/11/2025) for MTM Pharmacist Visit.    SUBJECTIVE/OBJECTIVE:                          Samara Oropeza is a 29 year old female seen for a follow-up visit.       Reason for visit: Cimzia denied - needs to start an alternate therapy    Allergies/ADRs:  Reviewed in chart  Past Medical History: Reviewed in chart  Tobacco: She reports that she has never smoked. She has never been exposed to tobacco smoke. She has never used smokeless tobacco.  Alcohol: Less than 1 beverages / week    Medication Adherence/Access: Medication barriers: obtaining medication from insurance. Cimzia currently denied by insurance due to insurance not wanting Cimzia and Otezla to be used together. Does not qualify for Cimzia PAP program, state hearing appeal is the only available option. Dermatology will continue writing prescription for Otezla for psoriasis and Rheumatology will manage infusions for psoriatic arthritis due to insurance requiring separate providers for separate conditions. Unable to redo Otezla PA under Dr. Tracey until next March 2026 due to insurance, but Dr. Tracey/dermatology MTM will continue to manage Otezla refills long-term.     Behçet's Disease/RA/Recurrent oral/genital ulcers:  Otezla 30 mg twice daily  Mometasone 0.1% topically twice daily as needed  Betamethasone 0.05% ointment twice daily for flares on trunk, extremities, and genitals   Lidocaine 4% cream topically as needed  Benzocaine 20% gel for mouth ulcers as needed     Reports she is continuing to have bothersome joint pain and oral/skin ulcers in mouth and chest. Did stop breastfeeding. Had discussed using infliximab and methotrexate as an alternate treatment option if Cimzia was not covered. Would like to pursue this as Cimzia is not being approved. Notes both medications would be new for her. No side effects or concerns with current medications.    Updated plan per Dr. Sosa:  Could start infliximab 3 mg/kg at 0, 2, and 6 weeks followed by 3 mg /kg every 8 weeks   Plus methotrexate 4 tabs a week   Plus folic acid 1 mg a day   DMARD labs 4 weeks after start of methotrexate   Should be reminded about potential SE of both drugs, no ETOH drinking on MTX and it is also teratogenic   Should also stay on  otezla for now      Pre-Biologic Screening:   Hep B Surface Antibody No record of completion    Hep B Core Antibody  Non-reactive (1/15/25)    Hep B Surface Antigen Non-reactive (1/15/25)    Hep C Antibody  Non-reactive (1/15/25)    HIV Antigen Antibody Non-reactive (11/1/16)    Quantiferon TB Gold Negative (1/15/25)       Last lab monitoring completed: 3/4/25    Today's Vitals: There were no vitals taken for this visit.  ----------------    I spent 22 minutes with this patient today. All changes were made via collaborative practice agreement with Dominga Sosa MD.     A summary of these recommendations was sent via [employer].    Natalie Leach PharmD  Medication Therapy Management Pharmacist  [facility]  Phone: [phone]    Telemedicine Visit Details  The patient's medications can be safely assessed via a telemedicine encounter.  Type of service:  Telephone visit  Originating Location (pt. 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   Rationale: Synergistic therapy - Needs additional medication therapy - Indication   Recommendation: Start Medication - INFLIXIMAB IV - 3 mg/kg at 0, 2, and 6 weeks followed by 3 mg /kg every 8 weeks and methotrexate 10 mg weekly   Status: Accepted per Provider   Identified Date: 6/6/2025 Completed Date: 6/6/2025

## 2025-06-06 NOTE — PATIENT INSTRUCTIONS
"Recommendations from today's MTM visit:                                                       1.  methotrexate vials, folic acid, and needles/syringes from [facility] pharmacy and start methotrexate 10 mg (0.4 mL) weekly and folic acid 1 mg daily.    2. Please call the [facility] infusion center at ([phone]) to schedule your infusion appointment in a week if scheduling has not contacted you. We will plan on you starting infliximab infusions on week 0, 2, and 6 then every 8 weeks.    Follow-up: Return in 5 weeks (on 7/11/2025) for MTM Pharmacist Visit.    It was great speaking with you today.  I value your experience and would be very thankful for your time in providing feedback in our clinic survey. In the next few days, you may receive an email or text message from [employer] with a link to a survey related to your  clinical pharmacist.\"     To schedule another MTM appointment, please call the clinic directly or you may call the MTM scheduling line at [phone].    My Clinical Pharmacist's contact information:                                                      Please feel free to contact me with any questions or concerns you have.      Natalie Leach, PharmD  Medication Therapy Management Pharmacist  [facility]  Phone: [phone]     "

## 2025-06-24 ENCOUNTER — INFUSION THERAPY VISIT (OUTPATIENT)
Dept: INFUSION THERAPY | Facility: CLINIC | Age: 30
End: 2025-06-24
Attending: INTERNAL MEDICINE
Payer: COMMERCIAL

## 2025-06-24 ENCOUNTER — RESULTS FOLLOW-UP (OUTPATIENT)
Dept: RHEUMATOLOGY | Facility: CLINIC | Age: 30
End: 2025-06-24

## 2025-06-24 VITALS
WEIGHT: 146.8 LBS | DIASTOLIC BLOOD PRESSURE: 67 MMHG | SYSTOLIC BLOOD PRESSURE: 101 MMHG | OXYGEN SATURATION: 100 % | BODY MASS INDEX: 26 KG/M2 | RESPIRATION RATE: 16 BRPM | HEART RATE: 100 BPM | TEMPERATURE: 97.4 F

## 2025-06-24 DIAGNOSIS — M06.09 RHEUMATOID ARTHRITIS OF MULTIPLE SITES WITHOUT RHEUMATOID FACTOR (H): Primary | ICD-10-CM

## 2025-06-24 LAB
ALBUMIN SERPL BCG-MCNC: 4.8 G/DL (ref 3.5–5.2)
ALT SERPL W P-5'-P-CCNC: 12 U/L (ref 0–50)
AST SERPL W P-5'-P-CCNC: 19 U/L (ref 0–45)
BASOPHILS # BLD AUTO: 0 10E3/UL (ref 0–0.2)
BASOPHILS NFR BLD AUTO: 1 %
CREAT SERPL-MCNC: 0.69 MG/DL (ref 0.51–0.95)
EGFRCR SERPLBLD CKD-EPI 2021: >90 ML/MIN/1.73M2
EOSINOPHIL # BLD AUTO: 0.1 10E3/UL (ref 0–0.7)
EOSINOPHIL NFR BLD AUTO: 3 %
ERYTHROCYTE [DISTWIDTH] IN BLOOD BY AUTOMATED COUNT: 12.5 % (ref 10–15)
HCT VFR BLD AUTO: 43 % (ref 35–47)
HGB BLD-MCNC: 14 G/DL (ref 11.7–15.7)
IMM GRANULOCYTES # BLD: 0 10E3/UL
IMM GRANULOCYTES NFR BLD: 1 %
LYMPHOCYTES # BLD AUTO: 1.1 10E3/UL (ref 0.8–5.3)
LYMPHOCYTES NFR BLD AUTO: 26 %
MCH RBC QN AUTO: 30.1 PG (ref 26.5–33)
MCHC RBC AUTO-ENTMCNC: 32.6 G/DL (ref 31.5–36.5)
MCV RBC AUTO: 93 FL (ref 78–100)
MONOCYTES # BLD AUTO: 0.2 10E3/UL (ref 0–1.3)
MONOCYTES NFR BLD AUTO: 5 %
NEUTROPHILS # BLD AUTO: 2.7 10E3/UL (ref 1.6–8.3)
NEUTROPHILS NFR BLD AUTO: 65 %
NRBC # BLD AUTO: 0 10E3/UL
NRBC BLD AUTO-RTO: 0 /100
PLATELET # BLD AUTO: 168 10E3/UL (ref 150–450)
RBC # BLD AUTO: 4.65 10E6/UL (ref 3.8–5.2)
WBC # BLD AUTO: 4.1 10E3/UL (ref 4–11)

## 2025-06-24 PROCEDURE — 96375 TX/PRO/DX INJ NEW DRUG ADDON: CPT

## 2025-06-24 PROCEDURE — 250N000011 HC RX IP 250 OP 636: Mod: JZ

## 2025-06-24 PROCEDURE — 84460 ALANINE AMINO (ALT) (SGPT): CPT

## 2025-06-24 PROCEDURE — 96413 CHEMO IV INFUSION 1 HR: CPT

## 2025-06-24 PROCEDURE — 36415 COLL VENOUS BLD VENIPUNCTURE: CPT

## 2025-06-24 PROCEDURE — 82040 ASSAY OF SERUM ALBUMIN: CPT

## 2025-06-24 PROCEDURE — 85004 AUTOMATED DIFF WBC COUNT: CPT

## 2025-06-24 PROCEDURE — 82565 ASSAY OF CREATININE: CPT

## 2025-06-24 PROCEDURE — 84450 TRANSFERASE (AST) (SGOT): CPT

## 2025-06-24 PROCEDURE — 258N000003 HC RX IP 258 OP 636

## 2025-06-24 PROCEDURE — 96415 CHEMO IV INFUSION ADDL HR: CPT

## 2025-06-24 RX ORDER — EPINEPHRINE 1 MG/ML
0.3 INJECTION, SOLUTION INTRAMUSCULAR; SUBCUTANEOUS EVERY 5 MIN PRN
OUTPATIENT
Start: 2025-07-08

## 2025-06-24 RX ORDER — ALBUTEROL SULFATE 0.83 MG/ML
2.5 SOLUTION RESPIRATORY (INHALATION)
OUTPATIENT
Start: 2025-07-08

## 2025-06-24 RX ORDER — METHYLPREDNISOLONE SODIUM SUCCINATE 40 MG/ML
40 INJECTION INTRAMUSCULAR; INTRAVENOUS
Start: 2025-07-08

## 2025-06-24 RX ORDER — DIPHENHYDRAMINE HYDROCHLORIDE 50 MG/ML
50 INJECTION, SOLUTION INTRAMUSCULAR; INTRAVENOUS
Status: COMPLETED | OUTPATIENT
Start: 2025-06-24 | End: 2025-06-24

## 2025-06-24 RX ORDER — DIPHENHYDRAMINE HCL 25 MG
25 CAPSULE ORAL ONCE
Start: 2025-07-08 | End: 2025-07-08

## 2025-06-24 RX ORDER — ACETAMINOPHEN 325 MG/1
650 TABLET ORAL ONCE
Start: 2025-07-08 | End: 2025-07-08

## 2025-06-24 RX ORDER — METHYLPREDNISOLONE SODIUM SUCCINATE 125 MG/2ML
125 INJECTION INTRAMUSCULAR; INTRAVENOUS ONCE
OUTPATIENT
Start: 2025-07-08 | End: 2025-07-08

## 2025-06-24 RX ORDER — ALBUTEROL SULFATE 90 UG/1
1-2 INHALANT RESPIRATORY (INHALATION)
Start: 2025-07-08

## 2025-06-24 RX ORDER — DIPHENHYDRAMINE HYDROCHLORIDE 50 MG/ML
25 INJECTION, SOLUTION INTRAMUSCULAR; INTRAVENOUS
Start: 2025-07-08

## 2025-06-24 RX ORDER — HEPARIN SODIUM,PORCINE 10 UNIT/ML
5-20 VIAL (ML) INTRAVENOUS DAILY PRN
OUTPATIENT
Start: 2025-07-08

## 2025-06-24 RX ORDER — HEPARIN SODIUM (PORCINE) LOCK FLUSH IV SOLN 100 UNIT/ML 100 UNIT/ML
5 SOLUTION INTRAVENOUS
OUTPATIENT
Start: 2025-07-08

## 2025-06-24 RX ORDER — DIPHENHYDRAMINE HYDROCHLORIDE 50 MG/ML
50 INJECTION, SOLUTION INTRAMUSCULAR; INTRAVENOUS
Start: 2025-07-08

## 2025-06-24 RX ADMIN — INFLIXIMAB 200 MG: 100 INJECTION, POWDER, LYOPHILIZED, FOR SOLUTION INTRAVENOUS at 11:10

## 2025-06-24 RX ADMIN — DIPHENHYDRAMINE HYDROCHLORIDE 50 MG: 50 INJECTION INTRAMUSCULAR; INTRAVENOUS at 11:40

## 2025-06-24 ASSESSMENT — PAIN SCALES - GENERAL: PAINLEVEL_OUTOF10: MODERATE PAIN (6)

## 2025-06-24 NOTE — PROGRESS NOTES
"Infusion Nursing Note:  Samara Oropeza presents today for infliximab.    Patient seen by provider today: No   present during visit today: Not Applicable.    Note: Week 0, dose 1.  Vitals monitored every 30 minutes per orders.  ~ 30 minutes into infusion patient reporting itching to BUE, BLE, face, and \"burning\" in chest. Infusion stopped. 50mg IV benadryl administered. Patient reported complete resolution of symptoms in ~ 15 minutes.  notified. Orders to restart infusion at 69ml/hr. Infusion restarted at 69ml/hr X 30 minutes then increased to 137.5ml/hr for the remainder of the infusion.     Intravenous Access:  Labs drawn without difficulty.  [facility] by [facility]    Treatment Conditions:  Biological Infusion Checklist:  ~~~ NOTE: If the patient answers yes to any of the questions below, hold the infusion and contact ordering provider or on-call provider.    Have you recently had an elevated temperature, fever, chills, productive cough, coughing for 3 weeks or longer or hemoptysis,  abnormal vital signs, night sweats,  chest pain or have you noticed a decrease in your appetite, unexplained weight loss or fatigue? Patient reporting chest pain intermittently X ~ 5 days. Denies currently. States when she does have an episode it lasts less than a minute and then resolves on it's own. Sometimes accompanied by SOB. Describes it as sharp/squeezing.   Do you have any open wounds or new incisions? No  Do you have any upcoming hospitalizations or surgeries? Does not include esophagogastroduodenoscopy, colonoscopy, endoscopic retrograde cholangiopancreatography (ERCP), endoscopic ultrasound (EUS), dental procedures or joint aspiration/steroid injections No  Do you currently have any signs of illness or infection or are you on any antibiotics? Yes clindamycin for skin eruption to chest - started clindamycin 2 days ago and will be on X 14 days   Have you had any new, sudden or worsening abdominal " "pain? No  Have you or anyone in your household received a live vaccination in the past 4 weeks? Please note: No live vaccines while on biologic/chemotherapy until 6 months after the last treatment. Patient can receive the flu vaccine (shot only), pneumovax and the Covid vaccine. It is optimal for the patient to get these vaccines mid cycle, but they can be given at any time as long as it is not on the day of the infusion. No  Have you recently been diagnosed with any new nervous system diseases (ie. Multiple sclerosis, Guillain Montezuma, seizures, neurological changes) or cancer diagnosis? Are you on any form of radiation or chemotherapy? No  Are you pregnant or breast feeding or do you have plans of pregnancy in the future? No  Have you been having any signs of worsening depression or suicidal ideations?  (benlysta only) N/A  Have there been any other new onset medical symptoms? No  Have you had any new blood clots? (IVIG only) N/A  Above positive biochecklist reported to . Per Dr.Fazeli kim to proceed with infusion today.     Hep B/TB negative    Post Infusion Assessment:  Patient tolerated infusion fairly due to : Hypersensitivity: Did patient have a hypersensitivity reaction? : Yes  Drug or Product name: infliximab  Were pre-meds administered?: No     First or Subsequent treatment: First time receiving  Rate of infusion when patient had hypersensitivity reaction: 137.5ml/hr  Time the hypersensitivity reaction was first recognized: 1140  Symptoms observed or reported (select all that apply): Itching, Other: (Comment) (\"burning\" sensation in chest)  Interventions/treatment following reaction: Infusion stopped, Hypersensitivity medications administered, Reduced rate of medication administration  What hypersensitivity medications were administered?: DiphendydrAMINE (benadryl)  Name of provider notified:   Time provider notified: 1155  Type of notification (select all that apply): Other: (Comment), " Paged/Phone ([employer])  Was the patient re-challenged today?: Yes - tolerated well  Blood return noted pre and post infusion.  Site patent and intact, free from redness, edema or discomfort.  No evidence of extravasations.  Access discontinued per protocol.     POST-INFUSION OF BIOLOGICAL MEDICATION:     Reviewed with patient.  Given biologic medication or medication hand-out. Inform patient if any fever, chills or signs of infection, new symptoms, abdominal pain, heart palpitations, shortness of breath, reaction, weakness, neurological changes, seek medical attention immediately and should not receive infusions. No live virus vaccines prior to or during treatment or up to 6 months post infusion. If the patient has an upcoming procedure or surgery, this should be discussed with the rheumatologist and surgeon or provider.    Discharge Plan:   Discharge instructions reviewed with: Patient.  Patient and/or family verbalized understanding of discharge instructions and all questions answered.  AVS to patient via [employer].  Patient will return 7/9/25 ([facility]) for next appointment.   Patient discharged in stable condition accompanied by: Significant other Avi.  Departure Mode: Ambulatory.    Administrations This Visit       diphenhydrAMINE (BENADRYL) injection 50 mg       Admin Date  06/24/2025 Action  $Given Dose  50 mg Route  Intravenous Documented By  Ashtyn Portillo RN              inFLIXimab (GENERIC EQUIV) 200 mg in sodium chloride 0.9 % 275 mL infusion       Admin Date  06/24/2025 Action  $New Bag Dose  200 mg Rate  137.5 mL/hr Route  Intravenous Documented By  Ashtyn Portillo RN               Admin Date  06/24/2025 Action  Restarted Dose   Rate  69 mL/hr Route  Intravenous Documented By  Ashtyn Portillo RN               Admin Date  06/24/2025 Action  Rate/Dose Change Dose   Rate  137.5 mL/hr Route  Intravenous Documented By  Ashtyn Portillo RN Julie Backhaus, RN

## 2025-06-24 NOTE — PATIENT INSTRUCTIONS
Dear Samara Oropeza    Thank you for choosing [facility] ([facility] for your infusion.  The following information is a summary of our appointment as well as important reminders.      EDUCATION POST BIOLOGICAL/CHEMOTHERAPY INFUSION  Call the triage nurse at your clinic or seek medical attention if you have chills and/or temperature greater than or equal to 100.5, uncontrolled nausea/vomiting, diarrhea, constipation, dizziness, shortness of breath, chest pain, heart palpitations, weakness or any other new or concerning symptoms, questions or concerns.  You can not have any live virus vaccines prior to or during treatment or up to 6 months post infusion.  If you have an upcoming surgery, medical procedure or dental procedure during treatment, this should be discussed with your ordering physician and your surgeon/dentist.  If you are having any concerning symptom, if you are unsure if you should get your next infusion or wish to speak to a provider before your next infusion, please call your care coordinator or triage nurse at your clinic to notify them so we can adequately serve you.     If you are a transplant patient and require transplant education, please click on this link: [email or web address].    If you have any questions on your upcoming Specialty Infusion appointments, please call scheduling at [phone].  It was a pleasure taking care of you today.    Sincerely,    [facility]  [address] [place] [address]  [id number]  Phone:  [phone]

## 2025-06-26 ENCOUNTER — VIRTUAL VISIT (OUTPATIENT)
Dept: RHEUMATOLOGY | Facility: CLINIC | Age: 30
End: 2025-06-26
Attending: INTERNAL MEDICINE
Payer: COMMERCIAL

## 2025-06-26 VITALS — HEIGHT: 63 IN | BODY MASS INDEX: 24.8 KG/M2 | WEIGHT: 140 LBS

## 2025-06-26 DIAGNOSIS — Z51.81 MEDICATION MONITORING ENCOUNTER: ICD-10-CM

## 2025-06-26 DIAGNOSIS — M06.00 SERONEGATIVE RHEUMATOID ARTHRITIS (H): ICD-10-CM

## 2025-06-26 DIAGNOSIS — M06.09 RHEUMATOID ARTHRITIS OF MULTIPLE SITES WITHOUT RHEUMATOID FACTOR (H): Primary | ICD-10-CM

## 2025-06-26 DIAGNOSIS — M19.90 INFLAMMATORY ARTHRITIS: ICD-10-CM

## 2025-06-26 PROCEDURE — G2211 COMPLEX E/M VISIT ADD ON: HCPCS | Performed by: INTERNAL MEDICINE

## 2025-06-26 PROCEDURE — 98006 SYNCH AUDIO-VIDEO EST MOD 30: CPT | Performed by: INTERNAL MEDICINE

## 2025-06-26 NOTE — PROGRESS NOTES
"Virtual Visit Details    Type of service:  Video Visit     3 33  3 37  Video Start Time: {video visit start/end time for provider to select:581498}  Video End Time:{video visit start/end time for provider to select:787804}    Originating Location (pt. Location): {video visit patient location:165736::\"Home\"}  {PROVIDER LOCATION On-site should be selected for visits conducted from your clinic location or adjoining [facility] hospital, academic office, or other nearby [facility] building. Off-site should be selected for all other provider locations, including home:387683}  Distant Location (provider location):  {virtual location provider:328017}  Platform used for Video Visit: {Virtual Visit Platforms:512273::\"c3 creations\"}    Virtual Visit Details    Type of service:  Video Visit     Joined the call at 3/19/2025, 2:33:41 pm.  Left the call at 3/19/2025, 2:47:40 pm  Originating Location (pt. Location): Home  Distant Location (provider location):  On-site  Platform used for Video Visit: c3 creations      Office Visit Details    Rheumatology Clinic Return Visit Patient     Samara Oropeza MRN# [id number]   YOB: 1995 Age: 29 year old     Date of Visit: 2025   Last seen: 1/15/2025         Assessment & Plan    Assessment & Plan   Samara is a 29 year old  female (ADRIAN 21) with Behçet's disease (pathergy, oral/genital ulcers, polyarthralgia) who presents today for RECHECK      # Behçet's disease (pathergy, arthralgias, recurrent oral / genital ulcers)  # s/p delivery on 2023 with high-risk pregnancy, complicated by pre-eclampsia,  birth but healthy baby    10/2022:  Mrs. Oropeza arrives for follow-up of her Behçet's disease that is more active at present with recent decrease of her Otezla (60mg --> 30mg) recommended by MFM. Previous discussion with her OB providers had been to decrease to the lowest effective dose, clearly 30mg is not effective as she describes worsened oral ulcers, genital " ulcers, arthralgias, abdominal pain, and thrombophlebitis. Although there is not a great deal of evidence for Otezla safety in pregnancy, its mechanism of action would suggest it. Counterbalancing these concerns are what we understand of Behçet's in pregnancy which more often improves but in some cases (~29%) becomes worse, and can flare more often in the 1st trimester and post- period.  She also describes some vision changes, which in the context of Behçet's is conerning. She has not seen Ophthalmology in many years; I will place a referral today.     2023: Stable today, on otezla 30 mg bid, breastfeeding, MFM is ok with breastfeeding, discussed ACR reproductive guideline, it does not recommend otezla during pregnancy and breastfeeding given lack of data; however Samara remained on it during pregnancy and now breastfeeding as stopping it leads to severe flare of her behcet and benefits of staying on it outweighs risks. Her MFM provider is aware of staying on otezla and is ok with it during breastfeeding.    23: Doing well, remains on otezla. Has had some interruptions in doses. Today has back and hand pain, and ankle swelling. Will start celebrex, ok with breastfeeding.     2024:    Behmain's oral/vaginal ulcers are under fair control on otezla, will continue. But she continues to have active inflammatory arthritis, she failed celebrex. Will add  mg bid; risks were discussed. HCQ is safe in pregnancy and breastfeeding in case of pregnancy in future . Will check labs.      2024:    Active inflammatory arthritis despite adding HCQ in 2024, HCQ causes SE, fingers are deforming. Recommend to add methotrexate; risks were discussed. Was informed that it is not allowed in pregnancy and breastfeeding. She will start after she is done with breastfeeding in 2024. She prefers sub q inj over oral to avoid GI SEs. Will continue otezla to control oral/genital ulcers. I reviewed labs,  "stable.    9/18/2024:    Continues to have active inflammatory arthritis, plan is to start methotrexate as soon as she is done with breastfeeding; risks were discussed.    Stopped HCQ in 6/2024 given lack of benefit.    Oral/genital ulcers due to Behcet's are under control on otezla.    New itching, skin rashes are ongoing. I will message her derm team as prescribe topical is not covered by insurance.    Plan:    When you stop breastfeeding, start methotrexate 0.4 ml weekly under skin injection, our pharmacist Natalie ([facility] referral) will call you to teach you how to do it    Start folic acid 1 mg a day to help with methotrexate side effects    Stay on otezla    Labs one month after start of methotrexate    Avoid pregnancy on methotrexate    Return in about 3-4 months in person    1/15/24:    Conversations today reveal alfredo is currently in the midst of a flare, with arthralgia of the hands, neck and ankles. She reports having the flares about once a month. This episode has been ongoing for \"3-4 days\". She also shared a recent diagnosis of cataracts, as well as the onset of blurry vision and floaters of both eyes which began with this flare. Samara is still weaning her child off of breast feeding, and has not been able to trial methotrexate. We discussed starting Imuran once daily and she was open to the suggestion. There was some concern about bruising following Imuran previously, however, she does not recall such a reaction.     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    In light of her eye findings, we also discussed scheduling an urgent ophthalmology visit to rule out uveitis.     Plan:  - Start Imuran 50 mg once daily; risks were discussed. Per ACR reproductive guidelines, it is safe in pregnancy and breastfeeding  - Continue Otezla  - Labs today including HepB/C " serolgy as well as Quantiferon TB testing in case of using remicade in future, as well as follow labs in 4 weeks to monitor effects of Imuran  - Ophthalmology referral made        Plan:    Add imuran 1 tab a day to otezla    Labs today and in 4 weeks    Urgent eye clinic appointment    Return video visit in about 3-4 months      Today 3/19/2025:    Active arthritis, no uveitis or active rashes. Oral/genital ulcers fairly controlled on otezla. Did not tolerate AZA but joint pain improved. Can't do methotrexate, because she is breastfeeding. Remicade is also not the best option as we prescribe low dose methotrexate to prevent allergic reaction. Recommend cimzia as next best option; risks were discussed. Cimzia is safe in pregnancy, breastfeeding.      Plan:    Try cimzia every 2 weeks    Prednisone taper as needed    Keep June appointment with me    TT 40 min was spent on date of the encounter doing chart review, history and exam, documentation and further activities as noted above. Any prior notes, outside records, laboratory results, and imaging studies were reviewed if relevant.    The longitudinal plan of care for the diagnosis(es)/condition(s) as documented were addressed during this visit. Due to the added complexity in care, I will continue to support Samara in the subsequent management and with ongoing continuity of care.      Disclosure: I earn consulting income from [employer], the company that manufactures Otezla. I am not involved directly in promoting otezla or doing research with otezla.    Dominga Sosa MD                 Medical History   History of Present Illness   Samara Oropeza is a 29 year old female who presents for follow-up of her Behçet's.      Today 3/19/2025:      -pain in hands is the major complaint, worse in AM, feels alexx    -was on AZA 50 mg every day, it helped with hand pain, had SE of palpitations and SOB    -no immediate pregnancy plan, still breastfeeding    -3 wk ago, had  genital lesions, still gets skin lesions over chest, mouth is ok, still on otezla    -eyes are better on eye drops    -gets headaches, has h/o migraines, worse recently, dizziness today, gets botox inj      1/15/2025   - Currently experiencing a flare with arthralgia in her hands, neck, and ankles, ongoing for 3-4 days    - She also reports new vision and floaters both eyes that began this flare, and an Ophthalmology referral was placed as a result    - Currently weaning from breastfeeding and has not yet trialed methotrexate    - 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    9/18/2024:    -reports being in a flare    -hands are pretty painful, bent 5th fingers    -knuckles swell up in AM and at night, reports 3 hours of AM stiffness    -almost done with breastfeeding, has not started the methotrexate yet    -reports severe itching over legs from knees down to legs, no rash, skin looks darker where she is itching, pretty swollen as well    -she never this kind of itching before, reports this started after back surgery    -had back surgery 4/30/2024, decompression of a nerve, it helped with nerve pain, still feels numb/tingly down in her legs    -gets sports over chest, breast, saw derm, was prescribed topical which is not covered by insurance. Next step would be laser tx    Seen Dr. Marilyn Moran Rheumatology in [facility] [place] 10/14:    Original presentation 2014:     Ms Oropeza is a 19 y.o. female who presents with one year of presentation of painful oral ulcers (monthly) once that have worsened with two episodes in the last two months that presented with painful vulvar or vaginal ulcers (2 episodes so far every month) [not sure if they leave scar] as well that timing coincides with menses ([facility] [place]: 8/25/14).   ORAL ULCERS: last two episodes were severe, painful, white base with erythematous halo, that appear and disappear in the matter of 1-2 weeks without, does not bleed and doesn't respond to any  treatment used (magic mouthwash), 10-15 in number with the biggest one being dime size.      VAGINAL ULCERS: 2-3 in number between labia majora and minora that occur simultaneously with oral ulcers, painful, non bleeding ulcers that are erythematous, no pus.      FOLLICULITIS: patient reports having spots in legs specially around ankle and knee, but arms, and neck are affected as well. Small lesions that resemble ingrown hair, most are red erythematous elevated lesions, well demarcated, some with liquid that patient refers as pimple like.      SKIN RASHES: welts and red macules with well defined borders that are pruritic and non-ulcerative on chest, arms and back. Benadryl relieves.      ARTHRALGIAS: In descending order of severity: hips, knees, wrists, shoulders, neck, lumbar, fingers.   C/o morning stiffness in hips, back and neck lasting for about until noon.      Ms. Oropeza reports they present in severe flares and never fully resolve, but do get better. She has seen some swelling and warmth on the affected joints but has not noticed and redness. Has tried Tylenol, ibuprofen, and hydrocodone with not much benefit.      FEVERS: Reports 3-4 sporadic episodes per month of self limited fevers of 38 C that occur during the evenings and associate facial flushing.      HEADACHES: occur daily and are bitemporal and irradiate occipitally, pulsatile in nature, intensity varies from intense to mild, are worsened with movement. On treatment with ibuprofen and somatriptan without any positive results.      VISION CHANGES: Patient reports that suddenly she would only see a gray blotch in her right eyes and would persist like this for a day and a half. Also reports blurriness in her left eye. Presence of pain and burning sensation of eyeball with associated redness. Has changed prescription glasses in the matter of 2 years 3 times. She has had opthalmology exam and was not suggestive of any evidence of uveitis.   She was also  evaluated in ED for a dizzy spell.      ABD PAIN W/ INTERMITTENT DIARRHEA AND CONSTIPATION: Patient reports abdominal pain that is intermittent, periods of 7 days without bowel movement and feeling bloated with change of pattern to diarrhea (liquidy without blood nor mucus, non foul smelling). Has taken Bentyl, Zegrid, and rinetidine with mild clinical improvement.  She also reports small red nodules after each needle stick for blood draw.   Neurology saw her back then and was not impressed with her presentation as physical examination was normal. Also MRI brain normal: Findings: No definite hemorrhage, mass affect, midline shift, or ventriculomegaly is noted.There are a few scattered non specific white matter T2 hyperintensities. Axial diffusion weighted images are unremarkable.     The major vascular structures appear patent. There is opacification and severe mucosal thickening in the right maxillary sinus. The remaining paranasal sinuses, and mastoid air cells are clear. Orbits are unremarkable  She has + HSV-1 IGG. Seen ID. Negative for Herpes simplex virus culture. HSV IGM I/II COMBINATION SENT TO [facility]  RESULTS = <0.91  REF RANGE: <0.91 = NEGATIVE  ID did not think HSV could explain her mouth and genital sores.      CRP within normal limits. HIV negative. CBC within normal limits; UA negative. Creatinine within normal limits   CYNDIE neg.  Antigliadin neg.   ANti TTG neg.   [facility] 2014: Colonoscopy and endoscopy neg; result not available. Not sure if biopsies were done.   Raynaud's phenomenon:  Onset: about 2013  Digits: all fingers  Digital ulcers: no  Color changes: white ---> purple and red  Duration of an attack: About couple of hours per mom  Pain during attack: not clear pain but bruise like feeling  Frequency of attacks: few times a month  Family history of Raynauds: no  GERD: very long time     No hemoptysis.   No miscarriages/ no thrombosis in past.   No family or personal history of psoriasis,  ulcerative colitis or chron's disease.   No buttock pain or low back pain or stiffness in AM     Interim history:  Dec 2014- Multiple mouth ulcers and did not eat for 5 days. This coincided with periods. Colchicine 0.6mg PO BID started in Nov 2014.   Prednisone taper in Dec 20mg to 0 in 4 weeks. She also used magic mouthwash. Kenalog cream. After going to the ER, 3 days later her ulcers were better. She thinks it improved after the menstruation stopped.   LMP 3 weeks ago.   COLCHICINE HAS HELPED A LOT REDUCING THE INTENSITY OF MENSTRUATION ASSOCIATED ORAL AND VULVAR ULCERS.      Interim history: 6/15     Since last visit only two episodes of mouth sores- small sized 6   No genital ulcers since last visit.   Colchicine 1.2 mg in AM and 0.6mg in PM keeps her symptoms under control.      Admitted in 5/15:  Admission Diagnoses:  - LUE weakness  - spell  - hx of migraines  - hx of Tourette syndrome  - hx of Behcet's disease     Discharge Diagnoses:   - spell likely 2/2 anxiety  - hx of migraines  - hx of Tourette syndrome  - hx of Behcet's disease     CT and MRI brain was normal.      Seeing Dr. Lilliana Miramontes soon as outpatient.      Dr. Garcia did not find any intraocular inflammation.       Interm 10/30/2015  ED for migraine. Continues to see neuro - MRI brain without lesions noted. Saw GI - upper barium normal. May be considering repeat colo. Worsening lesions in mouth and genitals. Has had continuous lesion in mouth x 2 months. 2 genital ulcers. Had fracture of right sesamoid in foot. Continues to have low grade fevers. New folliculitis on chest, legs and arms.      Interim hx: 1/11/2016    Pt states that since last visit she continues to have oral ulcers and has noted more folliculitis-like lesions on her lower extremities.  She states that on her right lower leg she had a red macular spot that has since resolved.  She denies uveitis.  She states that the colchicine initially helped but then stopped working.   "She takes 0.6 mg TID.  She stopped taking her prednisone last week as she feels like this hasn't helped.  She hasn't had any episodes of vaginal ulcers.      She saw GI who stated she has gastroparesis.  She is seeing Cardiology later this week for possible syncopal episodes.       Interim history 5/16  Mouth ulcers X 1 last month  Genital ulcers - none since last visit.      Bilateral MCPs pain, knee pain and low back pain +ve increased since last visit  Morning stiffness X 2 hours (increasing)   5-6/10     \"overall myalgia\" has gotten worse    C/o pseudofolliculitis lesion on anterior shins bilaterally worse     Interim history: (7/18/2016)  Patient has just started Humira for 2 doses on 7/1 and 7/15 and reported minimal improvement of her hip pain but otherwise, did not notice anything different.      She reported painful mouth ulcer once a month since last visit but noticed that it has been getting deeper.   Denied any genital ulcers.     Still has ongoing bilateral MCPs pain, knee pain but this has been intermittent and she did not have any much pain today. She reported 2-3 hours morning stiffness of both hands and pain score of 6/10 at her knees and 8/10 of her hands but currently takes no pain medication except prn ativan which she takes when her muscle is really tight.      The only concern is that she gained weight even though she has not been eating much (eat once a day) and do exercises every day for 1 hour (with video of yoga, pilates). Her mother wonders if she needs to have some labs work up include sex hormone, thyroid, cortisol.     Interval history 9/14/16  Patient was started Humira at the last visit, patient reports worsening of skin ulcers since starting Humira and stopped taking Humura for the past 2 weeks. Patient reports oral and genital ulcers has been stable even before starting Humira and has not had any interval oral/genital ulcers. However she reports recurrent skin ulcers occurring on a " daily basis that affects her chest and anterior legs. Patient also has stopped taking prednisone, she reports that she doesn't feel the prednisone helps, her last dose of prednisone was 6+ months ago. Patient also report worsening low back pain that sometimes causes her to limp.     In the interval patient also visited ED on 8/31/16 for left hand pain and what the patient describes as phlebitis, no medication or procedure was done and the patient was discharged with a splint. Patient also reported 1 episode of chest pressure that was associated with dyspnea and numbness of the left arm, she was shopping in a [employer] at the time of onset, and the pressure resolved after she took a nap.     Patient denies any infectious symptoms in the interval, no fever, chills, night sweat, cough, dysuria, denies eye pain or visual changes.     November 4, 2016  Oct - had one genital ulcer  Oral ulcers X 5- around menstruation time.   Knee pain- chronic on the left side  Dizziness spells - on and off; been to the ER for that in the past.   On and off migraines  July 2013 - s/p MPFL reconstruction plus LRL 7/2013  Morning stiffness in knee (left) X 1 hour     Severe jaw pain and chest pain- patient wondering if this is Tourette's or esophageal spasms. Cardiac workup negative.   Fever      January 13, 2017  Yesterday in ER [facility] [place] with orthostatic syncope from dehydration.   Chest pain on and off still continues. Painful with deep breaths.   Oral ulcers - few minor ones lasting for one week;   One major one in roof of mouth lasting for about one week.   Knee pain +ve - reviewed the recent MRI with her orthopedic surgeon.   On and off migraines  otezla is approved. Still waiting to receive the meds.      March 31, 2017  Otezla current dose 15mg PO BID.   She is tolerating okay at this dose and is willing to increase the dose further up to 30mg BID.   Her joint pains are better on otezla. Knee pain is also better.   Back and neck pain  +ve 8/10 when it is worse. Intramuscular botox injections were given on Monday in PMR.   2 minor genital ulcers in the interim- resolved.   Few oral ulcers in the interim- resolved.   Nasal ulcer - resolved.   Migraines on and off.   Nausea with otezla but improving.   Dizziness and blackouts on and off. She fell once and hurt her ankle. Wearing boot in left leg.   Complete ROS negative except for above     May 17, 2017  Tolerating otezla better. Not throwing up anymore. Current dose 20mg in AM and 30mg in PM (2nd week).   Patient thinks that her oral ulcers frequency and severity are improving with otezla. Also no genital ulcers in the interim.   Currently on doxycycline for bronchitis/?pneunomia. 4 more days left.      Joint pain history  2 weeks ago/ dx with pneumonia 1 week ago/  Involved joints: all over  Pain scale: 6.5/10   Wakes the patient from sleep : Yes  Morning stiffness: Yes for 120 minutes  Meds used:otezla,colchicine     Interim history  Since last visit:  1. Infections - Yes/ pneumonia  2. New symptoms/medical problem - Yes/ thinks she may have had a mini-seizure about 10 days ago ; did not seek medical attention; did not reoccur after that. Patient seeing PCP today.  3. Any side effects from Rheum medications -vomiting a lot (resolved)  3. ER visits/Hospitalizations/surgeries - Yes  4. Last PCP visit: has an apt. today     Patient still getting double vision. Floaters present.      Therapist recommended psychiatry evaluation. Patient does not want to see psychaitry. Patient will see PCP today.      August 22, 2017  Have you ever seen a rheumatologist Yes Who You When 5/17/17     NO genital ulcers in the interim.   Mouth ulcers- three in the back of the throat and roof of the mouth last month.      Joint pain history  Onset: doing alittle worse / cut her otezla back to 30mg  Daily due to GI problems  Involved joints: all over her body. Been having more black out episodes, been having more chest  pains.also has raised bumps on both legs from the knees down that itch and are painful   Pain scale:  5.5/10     Wakes the patient from sleep : Yes  Morning stiffness:Yes for 120 minutes  Meds used:otezla, colchicine (three pills daily)     Interim history  Since last visit:  1. Infections - Yes stomach issue  2. New symptoms/medical problem - No  3. Any side effects from Rheum medications -nausea from otezla  3. ER visits/Hospitalizations/surgeries - Yes, ER for foot, ankle injury from passing out and fell down the steps. Hit her head another time from passing out. Alcohol poisoning in July 4. Last PCP visit: 6/23/17 September 19, 2017  Have you ever seen a rheumatologist Yes Who You When 8/22/17  Joint pain history  Onset: pt states that she hurts everywhere for 6 days  Involved joints: all joints  Pain scale:  7/10     Wakes the patient from sleep : Yes/ not sleeping at all  Morning stiffness:Yes for 180 minutes  Meds used:colchicine, otezla, magic mouth wash, lidocaine gel  Last one week: increased joint pain; and genital ulcer  Genital lesion (dimed size) in the last one week  After botox a week ago, she had fever 101 for three days; near syncopes. Back pain; floaters  Chest pain , mouth sores, hand pain, morning pain were all better with otezla 30mg twice daily.     Interim history  Since last visit:  1. Infections - No  2. New symptoms/medical problem - No  3. Any side effects from Rheum medications -nausea from the otezla  3. ER visits/Hospitalizations/surgeries - No  4. Last PCP visit: may 2017      October 18, 2017     Have you ever seen a rheumatologist Yes Who You When 9/19/17  Joint pain history  NO mouth or genital sores in the last 4 weeks.   Medrol dosepak helped with visual symptoms but not joint pains.      Onset: pt states that she is doing better than last time with her behcet's. Today has severe stomach pains, states that she is constipated. Pt had a seizure 2 days ago. Does have a metallic  taste in her mouth  Involved joints: hands , neck, shoulders  Pain scale:  9/10 from stomach pain;      Wakes the patient from sleep : Yes  Morning stiffness:Yes for 120 minutes  Meds used:cochicine , otezla 30mg twice daily     Interim history  Since last visit:  1. Infections - No  2. New symptoms/medical problem - Yes/ the cartilage in her chest is inflamed  3. Any side effects from Rheum medications -nausea from the otezla  3. ER visits/Hospitalizations/surgeries - No  4. Last PCP visit: yesterday     January 16, 2018  Have you ever seen a rheumatologist Yes Who You When 10/18/17  Joint pain history  Onset: pt states that she was in the hospital for 5 days before maribell, they told her she had lesions in her brain in the white matter. Had blood work drawn in the ER and they told her her inflammatory markers were elevated. Was seen in the ER last week for vomiting and diarrhea, not eating. Saw Gastro. On 1/10/18. 1.5 weeks ago she had an endoscopy and colonoscopy. She has been having elbow  And hands and knee pain, loosing use of her hands. Been dropping things. Also states that she has been getting lesions up her nose  Involved joints: see above  Pain scale:  8/10     Wakes the patient from sleep : Yes  Morning stiffness:Yes for 120 minutes  Meds used:colchisine, otezla, magic mouth wash, kenolog cream, lidocaine gel     Interim history  Since last visit:  1. Infections - Yes/ had an infection in her jaw. Having sinus issues  2. New symptoms/medical problem - Yes/ states that she is having problems walking, states that she was bouncing when she was walking  3. Any side effects from Rheum medications -otezla, nausea  3. ER visits/Hospitalizations/surgeries - Yes/ see chart  4. Last PCP visit: November 2017 April 17, 2018  Have you ever seen a rheumatologist Yes Who You When 1/16/18  Joint pain history  Onset: pt states that she is not doing well. She is having increased stomach issues, only eats 2 times in  4 days unable to keep the otezla down due to vomiting . Has sleep study done in 1 week; no genital ulcers since last appointment. 6 small mouth sores since last appointment.   Involved joints: see above   Pain scale:  7/10     Wakes the patient from sleep : Yes  Morning stiffness:Yes for 60 minutes  Meds used:otezla , colchicine, diclofenac gel, magic mouthwash, lidocaine gel      Interim history  Since last visit:  1. Infections - Yes/ had a sinus infection, thinks she is getting sick now  2. New symptoms/medical problem - Yes/ neurology sent pt to cardiology. Has a heart condition but not sure what . She is seeing a ortho. Due to knee pain   3. Any side effects from Rheum medications -nausea/vomiting from the otezla   3. ER visits/Hospitalizations/surgeries - Yes/ seen in ER   4. Last PCP visit: yesterday     July 17, 2018  Have you ever seen a rheumatologist Yes Who You When 4/17/18  Joint pain history  Onset: pt Is here for a follow-up states that she started to get lesions on her legs , face and arm. Hurts all over, lower back is very painful. Right hand and wrist area are numb  Involved joints: see above  Pain scale:  7/10   worse in the morning  Wakes the patient from sleep : Yes/ does not go to sleep till late  Morning stiffness:Yes for 4-5 hours minutes  Meds used:colchicine, otezla has  Not started otezla yet due to extreme nausea      Interim history  Since last visit:  1. Infections - Yes/ had a bacterial infection in her pelvic area was hospitalized  2. New symptoms/medical problem - Yes/ hands are swelling up. Having orthopedic issues. Was having problem with her veins sticking up, and very painful. Has happened multiply times   3. Any side effects from Rheum medications -see above  3. ER visits/Hospitalizations/surgeries - Yes/ hospital and ER for bacterial infection  4. Last PCP visit: 4/24/18 January 9, 2019  Have you ever seen a rheumatologist yes Who you When 7/17/18  Joint pain  history  Onset: Patient is here for a follow up on Behcet's disease, and fibromyalgia. ER said may have blood clot in calf, but when did MRI, stated body may have broken it up on it's own. Elevated D-dimer  Involved joints: Knees, neck, hands  Pain scale:  6.5/10     Wakes the patient from sleep : Yes  Morning stiffness:Yes for 180 minutes  Meds used:hyoscyamine, not taking otezla. Last dose Sep. Patient did not want to take otezla with the antibiotics.      Last major mouth ulcer- aug or Sep  No genital ulcers in the last 6 months.      Interim history  Since last visit:  1. Infections - Yes, UTI's twice a month since last visit  2. New symptoms/medical problem - No  3. Any side effects from Rheum medications -otzela causes nausea  3. ER visits/Hospitalizations/surgeries - Yes, 1/2/19 repaired some ligaments and mass taken out, also joint build up removed from a previous injury  4. Last PCP visit: 12/5/19 June 12, 2019  Have you ever seen a rheumatologist yes Who you When 1/9/19  Joint pain history  Onset: Patient is here for a follow up. A lot of infections and in and out of the hospital, who wanted her to follow up with Rheumatology again.  Involved joints: knees, legs, back, neck, shoulders, ankles  Pain scale:  6.5/10     Wakes the patient from sleep : Yes  Morning stiffness:Yes for 120 minutes  Meds used:magic mouthwash, colchicine     Interim history  Since last visit:  1. Infections - Yes, staph  2. New symptoms/medical problem - kidney failure  3. Any side effects from Rheum medications -none  3. ER visits/Hospitalizations/surgeries - Yes, 3 hospitalizations, and surgeries,  4. Last PCP visit: 6/11/19 9/30/2020: New to me, establishing care. Flaring off otezla.     816   Reports worsening oral ulcers and joint pain and skin rash.     No vaginal ulcers currently. Last ones were 5 months ago.     Gets oral ulcers monthly, not today, had them last few days ago. They come up as flare up around period  time. They self-resolve.     Thinks colcrys is helping but not enough, lost some benefit. No longer has diarrhea from it. the dose is 0.6 mg bid.     Came off otezla last year when she had severe staff infection, there was drug drug interaction with vancomycin. Wants to go back on it.     Skin lesions over chest are clearing up. Has skin lesions over her legs.     She is off of otezla >1 yr. She had daily vomiting and abdominal cramps initially which later resolved after 2 months.      Today, is her last day liz her health insurnaace  .     Reports pain and swelling liz hands. Drops things, lost strenghth. Veins look inflamed. Hands are swollen in AM and before bedtime. AM stiffness is 2 hours. can t tolerate ibuprofen.     Prednnisonne does not help much.     Wants to try eleve.     Had 2 blood clots over L arm, finished xraalto 4 months ago.      Was told to have severe dry eyes, hs not had eyes checked> 1 yr as was in the hospital with staff infection. systanee nd gentle eyedrops help some, sometimes gets sharp pain and and blurry vision in her eyes from dryness. No h/o uveitis.     has dry mouth, drinks water.     Gets T  F sometimes. It is sporadic.     Lost hair.     Gets intermittent CP. Had several hospital visits and admissions in the pst for it. lorazepam helped witch chest spasms, she does not like pain meds.     She was prescribed 0.5 mgq d prn, 10 tbs/monthss.     She gets dry cough, just started using albuterol inhaler, it helps.     No SOB.     Has gastroparesis, IBS  nd h/o severas admission for constiption, colon blockage with impaction and gets bloating. has lost follow up with GI.     She is working with  to get medical assistance to get insurance back by early next year. She filed it again.     Gets botox inj every 3 months nd they help, has to cancel 10/20 botox inj s will not have insurance. they came back yesterday.     last seizure waas last year. still gets black out  spells, salts ne was last week.    derm eye gi       FHx: Both parents have RA.  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 sexually active, not on oral contraceptives. She thinks she had a misccraaaiaage last wk, had n period x2 months then mueller bleeding a lot, dark red and was different from period. Felt something came out that she feels she miscarried, no pos pregnancy test. No smoking. rarely drinks ETOH.  [facility]   thumbs between MCP tender   otezla helped with skin lesions, oral ulcers, joint pain.  colcrys  sjogre aleve biotene voltaren [phone]        8/11/2022: Samara presents for urgent visit to discuss m/o Behcet's flare during pregnancy, she is   She is pregnant 10 wk 3 days with ADRIAN 3/6/2023.  In 2020, had 1st trimester miscarriage.  Has LBP, ankle pain/swelling.   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 Discussed with MFM, back on low dose otezla since last week, only 1 tab a day.    Interval History  10/28/2022  Mrs. Oropeza was last seen 08/11/22. At that time she was advised to continue otezla (1 tablet BID) and remain off colcrys. Since that time, she has instead taken otezla 1 tablet daily. She notes more joint pain in hands, wrists, cervical spine, knees with 4 hours of monring stiffness. Previously <1hr with full strength.     Had a few genital ulcers recently which only lasted a few days. Oral ulcers have been more active of late and are currently healing.     Abdominal pain on L side, pain is always worse after eating. Out of the phase of her pregnancy with morning sickness, at 22w on Monday.     She describes a few fevers at the end of September with hot flashes and cold sweats with Tmax 100. Called OB-GYN who instructed she should go in if persistent, though it resolved within a few days.     More easy bruising - thighs, she is currently on lovenox and baby aspirin. Previous  history of clots especially with interventions. She recently had an issue with superficial thrombophlebitis following a blood draw.     Seeing more floaters in her vision. Sometimes tiny and clear, but can also be larger and 'gray', up to 1/4 of her vision. Has not seen Ophthalmology in many years.     More frequent migraines - did a nerve block recently but this has worn off, previous botox injections.     ADRIAN 03/06/23.    1/25/23:    Baby is growing small. Had high BP yesterday, they would monitor it. DBP was 90.    Increasing otezla to bid helped. It helped with ulcers. Has skin breakdown since Christmas, never had it like this over her chest, but still it feels related to Behcet.    Hands and ankles are painful and swoleln, ankles are new but had hand pain with behcet before,. This started fater entering [address] [place] [address]. Her ADRIAN might be earlier based on baby's growth, induction at 37 wk, progress at 39. nex twk will have another check.    Still has the neck pain. Still gets eye floaters.    6/21/2023:    She delivered 1 month early due to pre-eclampsia. Had Mg infusion, had bleeding issues, spent a week. Was on BP med till a month ago, now stable. Was induced, had NVD. No blood transfusions needed.    Her baby girl Lashanda was born 2/12/2023, small, slow growth but healthy with no fetal deformities. Had vaginal sores after giving birth. Still gets mouth ulcers, one oral ulcer now, no vaginal ulcers now. Plans to breast feed x 7 months. Does breastfeeding. Saw OB/GYN in 4/2023 at [facility]. They are aware she is on otezla.    She was on ASA 81 mg every day during pregnancy.    9/27/23: Doing well, less behcet flares since giving birth. Most recent flare on her chest, now healing. No new oral or vaginal ulcers.     She has been having back pain that shoots down her right leg since June and has been utilizing PT without improvement.     She also has had swelling and pain in both hands and right wrist, as well as  both ankles L>R.     She notes missing some doses of otezla due to running out of refills and avoiding taking it without food. She has been eating less because she has been more tired since having her daughter.       2/1/2024:    Has back pain. Had epidural inj one wk ago, has to wait x 2 weeks, if no help to get surgery for bulging disc    Some oral lesions 2wk ago flare with swollen hands, but currently has no oral ulcers.     Hands hurt today with pain level about 4/10. Celebrex did not help much.    No vaginal ulcers    Reports loss of appetite past 5 months    Her baby girl was sick x past 2 weeks, just got better last night, she had RSV. Samara did not get much sleep, did not eat much over past 2 weeks because of this.      6/5/2024:    -active inflammatory arthritis despite adding HCQ last visit. HCQ causes GI upset    -fingers are deforming, new    -not feeling well, has neck pain, nausea x past few days    -still breastfeeding, no pregnancy plans    -missed otezla x 2 days, got some skin lesions back over chest area; otherwise otezla is managing her behcet's sores well    Review of Systems    A comprehensive ROS was done. Positives are per HPI.    Past Medical History   Past Medical History:   Diagnosis Date    Anemia     Anxiety     Arthritis     Behcet's disease (H)     Cervical adenitis May 2010    Chronic abdominal pain     Constipation, chronic 1994    Fibromyalgia     Gastro-oesophageal reflux disease     Gastroparesis     Irregular heart beat     tachycardia, has had workup    Migraines     Neuromuscular disorder (H)     fibramyalgia    Palpitations     PONV (postoperative nausea and vomiting)     Seizure (H)     Seizures (H)     unknown etiology    Syncope     Tourette's       Past Surgical History:   Procedure Laterality Date    ARTHROSCOPY ANKLE, OPEN REPAIR LIGAMENT, COMBINED Left 9/25/2019    Procedure: LEFT ANKLE ARTHROSCOPY WITH LIGAMENT REPAIR;  Surgeon: Andres Johnson DPM;   Location: [facility]    ARTHROSCOPY ANKLE, REPAIR LIGAMENT Left 1/2/2019    Procedure: Ankle arthroscopy and sinus tarsi evacuation, ligament repair, left lower extremity;  Surgeon: Andres Johnson DPM;  Location: [facility] OR    ARTHROSCOPY KNEE WITH PATELLAR REALIGNMENT  7/25/2013    Procedure: ARTHROSCOPY KNEE WITH PATELLAR REALIGNMENT;  Left Knee Arthroscopy, Medial Patellofemoral Ligament Reconstruction with Allograft  ;  Surgeon: Jennifer Acevedo MD;  Location: [facility]    COLONOSCOPY  2015    DENTAL SURGERY  1996    Teeth removal    ENDOSCOPY UPPER, COLONOSCOPY, COMBINED  2005    HC ESOPH/GAS REFLUX TEST W NASAL IMPED >1 HR N/A 2/15/2017    Procedure: ESOPHAGEAL IMPEDENCE FUNCTION TEST WITH 24 HOUR PH GREATER THAN 1 HOUR;  Surgeon: Timothy Matta MD;  Location: [facility]    IR LUMBAR PUNCTURE  1/22/2024    IR PICC PLACEMENT > 5 YRS OF AGE  3/13/2019    IR UPPER EXTREMITY VENOGRAM LEFT  2/25/2020    IRRIGATION AND DEBRIDEMENT FOOT, COMBINED Left 3/12/2019    Procedure: COMBINED IRRIGATION AND DEBRIDEMENT LEFT ANKLE;  Surgeon: Micha Glover MD;  Location: [facility]    IRRIGATION AND DEBRIDEMENT LOWER EXTREMITY, COMBINED Left 5/7/2019    Procedure: 1.  Excision of wound down to and including deep fascia, less than 20 cm2.  2.  Irrigation and debridement, left ankle.;  Surgeon: Andres Johnson DPM;  Location: [facility] OR    PICC INSERTION Left 05/05/2019    4Fr - 45cm (5cm external), Basilic vein, SVC RA junction      Patient Active Problem List    Diagnosis Date Noted    Cough 04/02/2025     Priority: Medium     Created by Conversion      Tinnitus 04/02/2025     Priority: Medium     Created by Conversion        Replacement Utility updated for latest IMO load      History of gingival bleeding 04/26/2023     Priority: Medium    Vaginal pain 04/26/2023     Priority: Medium    Urge urinary incontinence 04/19/2023     Priority: Medium    Anemia 02/20/2023     Priority: Medium    Chronic hypertension 02/20/2023      Priority: Medium    History of pre-eclampsia 02/20/2023     Priority: Medium    Preeclampsia 02/10/2023     Priority: Medium    Dysthymic disorder 10/13/2022     Priority: Medium     Created by Conversion      History of DVT (deep vein thrombosis) 08/23/2022     Priority: Medium    History of anemia 08/10/2022     Priority: Medium    De Quervain's tenosynovitis, right 03/10/2022     Priority: Medium     Added automatically from request for surgery [id number]      Trigger finger of right thumb 03/10/2022     Priority: Medium     Added automatically from request for surgery [id number]      Infection of joint of ankle (H) 05/06/2019     Priority: Medium    Cellulitis 03/09/2019     Priority: Medium    Displacement of lumbar intervertebral disc without myelopathy 11/13/2018     Priority: Medium     Overview:   Created by Conversion      Iron deficiency associated with nonfamilial restless legs syndrome 11/13/2018     Priority: Medium    Enthesopathy of hip region 11/13/2018     Priority: Medium     Overview:   Created by Conversion      Tourette's syndrome 11/13/2018     Priority: Medium     Overview:   Created by Conversion      Somatic symptom disorder 06/01/2018     Priority: Medium    Pelvic floor weakness 04/25/2018     Priority: Medium    Spells of decreased attentiveness 12/19/2017     Priority: Medium    Mobile cecum (H) 11/09/2017     Priority: Medium     Cecum noted in Right lower quadrant on 4/17 CT scan, and in Left upper Quadrant on CT on 11/2017.      Vitamin D deficiency 10/11/2017     Priority: Medium     How low, unknown/not found. On D when tested at 27. Starting cholecalciferol October 2017. Needs recheck.      Convulsions, unspecified convulsion type (H) 10/03/2017     Priority: Medium    Transient alteration of awareness 10/03/2017     Priority: Medium    Chronic pain syndrome 07/27/2017     Priority: Medium    Major depressive disorder, recurrent episode, moderate (H) 06/27/2017     Priority: Medium     Cervical pain 05/02/2017     Priority: Medium    Acute left ankle pain 03/31/2017     Priority: Medium    Cervical dystonia 03/28/2017     Priority: Medium    PTSD (post-traumatic stress disorder) 01/17/2017     Priority: Medium    Patellofemoral instability 10/20/2016     Priority: Medium    Fibromyalgia 08/04/2016     Priority: Medium    Rheumatoid arthritis of multiple sites without rheumatoid factor (H) 08/04/2016     Priority: Medium    Raynaud's disease without gangrene 08/04/2016     Priority: Medium    Chronic abdominal pain 08/04/2016     Priority: Medium    Palpitations 01/12/2016     Priority: Medium    On colchicine therapy 10/30/2015     Priority: Medium    Spell of shaking 05/06/2015     Priority: Medium    Migraine 02/04/2015     Priority: Medium    Behcet's disease (H) 12/10/2014     Priority: Medium    Headaches due to old head injury 11/12/2013     Priority: Medium    Milk protein intolerance 10/11/2013     Priority: Medium    Intestinal malabsorption 10/11/2013     Priority: Medium    Concussion 02/13/2013     Priority: Medium     Jan 2013, with prolonged recovery- followed by sports med        Knee pain 01/03/2013     Priority: Medium    Generalized anxiety disorder 06/25/2009     Priority: Medium    Anxiety state 06/25/2009     Priority: Medium     Created by Conversion    Replacement Utility updated for latest IMO load      Tics - Tourette syndrome 05/18/2009     Priority: Medium     Followed by psychotherapy. Symptoms well managed. Originally diagnosed at [facility] neurolog[facility]. (Dr. Simpson)          IBS (irritable bowel syndrome) 05/18/2009     Priority: Medium    Allergic rhinitis 05/18/2009     Priority: Medium    GERD (gastroesophageal reflux disease) 01/10/2008     Priority: Medium    Gastroparesis 1994     Priority: Medium      Family History   Problem Relation Age of Onset    Depression Mother     Neurologic Disorder Mother         Migraines, take imitrex injection.  Also in  maternal grandmother.      Alcohol/Drug Father     Hypertension Father     Depression Father     Osteoarthritis Father     Cardiovascular Maternal Grandmother     Depression Maternal Grandmother     Hypertension Maternal Grandmother     Alzheimer Disease Maternal Grandmother     Cardiovascular Maternal Grandfather     Hypertension Maternal Grandfather     Depression Maternal Grandfather     Alcohol/Drug Maternal Grandfather     Diabetes Maternal Grandfather     Cardiovascular Paternal Grandmother     Hypertension Paternal Grandmother     Cardiovascular Paternal Grandfather     Hypertension Paternal Grandfather     Glaucoma No family hx of     Macular Degeneration No family hx of       Allergies   Allergen Reactions    Amoxil [Penicillins] Rash     Dad unsure of reaction.    Bee Venom Anaphylaxis    Bioflavonoids Anaphylaxis    Citrus Anaphylaxis     All [place]    Contrast Dye Rash     Contrast Media Ready-Box [facility], 04/09/2014.; Contrast Media Ready-Box [facility], 04/09/2014.  NOTE: this is a contrast media oral with iodine. Premedicate with methylpred standard for IV contrast, request barium contrast for oral contrast.    Iodinated Contrast Media Hives and Rash     Contrast Media Ready-Box [facility] [place], 04/09/2014.; Contrast Media Ready-Box [facility] [place], 04/09/2014.  NOTE: this is a contrast media oral with iodine. Premedicate with methylpred standard for IV contrast, request barium contrast for oral contrast.    Pineapple Anaphylaxis, Difficulty breathing and Rash    Reglan [Metoclopramide] Other (See Comments)     IV dose only, in ER, rapid heart rate.    Ace Inhibitors      Difficulty in breathing and GI upset    Amitiza [Lubiprostone] Nausea and Vomiting    Amoxicillin-Pot Clavulanate     Midazolam Unknown     parent states that when pt takes this medication, she wakes up being very violent .    Midazolam Hcl      Coming out of pelvic exam at age of 5, was kicking and screaming when coming out of the versed.    Other [No Clinical  Screening - See Comments]      Bleech/ chest tightness, itchy throat, swollen tongue, hives    Tizanidine Other (See Comments)     Confusion, back pain, photophobia, abdominal pain, shaking, anxious      Adhesive Tape Rash    Azithromycin Hives and Rash    Cephalexin Itching and Rash    Sulfa Antibiotics Rash     Skin scarring      Current Outpatient Medications   Medication Sig Dispense Refill    amitriptyline (ELAVIL) 25 MG tablet TAKE ONE TABLET BY MOUTH AT BEDTIME 90 tablet 1    amitriptyline (ELAVIL) 25 MG tablet Take 1 tablet (25 mg) by mouth at bedtime. 90 tablet 1    apremilast (OTEZLA) 30 MG tablet Take 1 tablet (30 mg) by mouth 2 times daily. 60 tablet 11    citalopram (CELEXA) 20 MG tablet Take 1 tablet (20 mg) by mouth daily 90 tablet 1    albuterol (PROAIR HFA/PROVENTIL HFA/VENTOLIN HFA) 108 (90 Base) MCG/ACT inhaler Inhale 2 puffs into the lungs every 6 hours as needed for shortness of breath / dyspnea or wheezing 1 Inhaler 1    artificial tears OINT ophthalmic ointment 0.5 inch strip each eye at night 1 Tube 11    augmented betamethasone dipropionate (DIPROLENE-AF) 0.05 % external ointment Apply topically 2 times daily. 50 g 3    azelaic acid (FINACIA) 15 % external gel Apply topically at bedtime. 50 g 11    azelaic acid (FINACIA) 15 % external gel Once daily to scars, upper chest and small portion of back and spot on belly button. Hold if irritating 50 g 5    benzocaine (AMERICAINE) 20 % external aerosol Apply to perineum four times daily as needed for pain 57 g 3    benzocaine (TOPICALE XTRA) 20 % GEL Apply 1 g to affected area 4 times daily as needed for mouth sores. 30 g 5    betamethasone valerate (VALISONE) 0.1 % cream Apply topically 2 times daily as needed       bisacodyl (DULCOLAX) 5 MG EC tablet Take 2 tablets (10 mg) by mouth daily as needed for constipation 30 tablet 0    brimonidine (ALPHAGAN) 0.2 % ophthalmic solution Place 1 drop into both eyes daily as needed (For reduced glare with  nighttime driving). 5 mL 1    EPINEPHrine (EPIPEN 2-EAMON) 0.3 MG/0.3ML injection Inject 0.3 mLs (0.3 mg) into the muscle as needed for anaphylaxis 0.6 mL 3    etonogestrel (NEXPLANON) 68 MG IMPL Inject 68 mg Subcutaneous      fluocinonide (LIDEX) 0.05 % external gel Apply topically 2 times daily. Intra-oral 60 g 11    fluocinonide (LIDEX) 0.05 % external gel Apply topically 2 times daily. 60 g 11    folic acid (FOLVITE) 1 MG tablet Take 1 tablet (1 mg) by mouth daily. 90 tablet 3    gabapentin (NEURONTIN) 300 MG capsule Take 1 capsule (300 mg) by mouth every morning 60 capsule 1    hydroquinone (ARACELY) 4 % external cream Apply once daily for 3 months. Stop for one month. If further lightening is desired, proceed with one additional month of treatment. 28 g 1    hydrOXYzine HCl (ATARAX) 25 MG tablet TAKE ONE OR TWO TABLETS BY MOUTH EVERY SIX HOURS AS NEEDED (Patient not taking: Reported on 6/24/2025)      lactulose 20 GM/30ML SOLN Take 30 mLs by mouth 3 times daily as needed (for constipation) 300 mL 3    lanolin ointment Apply topically every hour as needed for other (sore nipples) 7 g 3    lidocaine (LMX4) 4 % external cream Apply topically once as needed for mild pain 120 g 1    lifitegrast (XIIDRA) 5 % opthalmic solution Place 1 drop into both eyes 2 times daily. 180 each 3    LINZESS 290 MCG capsule TAKE 1 CAPSULE BY MOUTH EVERY DAY IN THE MORNING BEFORE BREAKFAST 90 capsule 0    LORazepam (ATIVAN) 1 MG tablet Take 1 tablet by mouth every 8 hours as needed.      methotrexate sodium 50 MG/2ML SOLN Inject 0.4 mLs (10 mg) as directed once a week. 8 mL 5    mometasone (ELOCON) 0.1 % external ointment Apply topically 2 times daily. 45 g 11    ondansetron (ZOFRAN ODT) 8 MG ODT tab Take 1 tablet (8 mg) by mouth every 8 hours as needed 90 tablet 3    polyethylene glycol (MIRALAX/GLYCOLAX) powder Take 1 capful by mouth 3 times daily      predniSONE (DELTASONE) 5 MG tablet 15-10-5 mg a day, each course x  5 days then  "stop as needed for flare ups (Patient not taking: Reported on 6/24/2025) 30 tablet 2    Prenatal MV-Min-Fe Fum-FA-DHA (PRENATAL 1 PO)  (Patient not taking: Reported on 6/24/2025)      rizatriptan (MAXALT-MLT) 5 MG ODT DISSOLVE 1 OR 2 TABLETS IN THE MOUTH AS NEEDED FOR HEADACHE AT ONSET OF MIGRAINE, MAY REPEAT IN 2 HOURS AS NEEDED. MAX 30MG IN 24 HOURS AND MAX 5 DAYS PER MONTH      Sharps Container MISC Use for methotreaxte shots (Patient not taking: Reported on 6/24/2025) 1 each 11    sodium fluoride 1.1 % CREA Apply 1 Application topically daily (Patient not taking: Reported on 6/24/2025)      sucralfate (CARAFATE) 1 GM/10ML suspension Take 10 mLs (1 g) by mouth 4 times daily (Patient not taking: Reported on 6/24/2025) 1200 mL 2    syringe/needle, sisp, 25G X 5/8\" 1 ML MISC Inject 1 Syringe subcutaneously every 7 days. Use with Methotrexate 12 each 3    triamcinolone (KENALOG) 0.1 % external ointment Apply twice daily as needed to lesions on the genitals and body. OK to use very sparingly on the face. 454 g 2    White Petrolatum-Mineral Oil (ARTIFICIAL TEARS) 83-15 % OINT Apply 1 g to eye at bedtime. Apply to each eye at night at bedtime. 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  CBC RESULTS: Recent Labs   Lab Test 09/07/20  1147   WBC 4.1   RBC 5.09   HGB 13.0   HCT 43.0   MCV 85   MCH 25.5*   MCHC 30.2*   RDW 15.0        TSH   Date Value Ref Range Status   09/07/2020 0.77 0.40 - 4.00 mU/L Final   03/21/2019 0.53 0.40 - 4.00 mU/L Final   10/30/2018 1.06 0.40 - 4.00 mU/L Final   11/26/2016 0.87 0.40 - 4.00 mU/L " Final     T4 Free   Date Value Ref Range Status   01/31/2007 1.26 0.70 - 1.85 ng/dL Final     Rheumatoid Factor   Date Value Ref Range Status   02/06/2024 <10 <14 IU/mL Final   09/30/2020 <7 <12 IU/mL Final   05/06/2016 <20 <20 IU/mL Final     Component      Latest Ref Rng 3/4/2025  11:58 AM   WBC      4.0 - 11.0 10e3/uL 3.8 (L)    RBC Count      3.80 - 5.20 10e6/uL 4.60    Hemoglobin      11.7 - 15.7 g/dL 14.0    Hematocrit      35.0 - 47.0 % 41.9    MCV      78 - 100 fL 91    MCH      26.5 - 33.0 pg 30.4    MCHC      31.5 - 36.5 g/dL 33.4    RDW      10.0 - 15.0 % 12.1    Platelet Count      150 - 450 10e3/uL 193    % Neutrophils      % 64    % Lymphocytes      % 28    % Monocytes      % 6    % Eosinophils      % 2    % Basophils      % 0    % Immature Granulocytes      % 0    Absolute Neutrophils      1.6 - 8.3 10e3/uL 2.4    Absolute Lymphocytes      0.8 - 5.3 10e3/uL 1.1    Absolute Monocytes      0.0 - 1.3 10e3/uL 0.2    Absolute Eosinophils      0.0 - 0.7 10e3/uL 0.1    Absolute Basophils      0.0 - 0.2 10e3/uL 0.0    Absolute Immature Granulocytes      <=0.4 10e3/uL 0.0    Creatinine      0.51 - 0.95 mg/dL 0.68    GFR Estimate      >60 mL/min/1.73m2 >90    Sed Rate      0 - 20 mm/hr 5    CRP Inflammation      <5.00 mg/L <3.00    ALT      0 - 50 U/L 15    Albumin      3.5 - 5.2 g/dL 4.7    AST      0 - 45 U/L 19       Legend:  (L) Low  Reviewed Rheumatology lab flowsheet

## 2025-06-26 NOTE — NURSING NOTE
Current patient location: [address] [place] [address]   [facility] [place] [address]    Is the patient currently in the state of [place]? YES    Visit mode: VIDEO    If the visit is dropped, the patient can be reconnected by:VIDEO VISIT: Text to cell phone:   Telephone Information:   Mobile [phone]       Will anyone else be joining the visit? NO  (If patient encounters technical issues they should call [phone]    Are changes needed to the allergy or medication list? No    Are refills needed on medications prescribed by this physician? NO    Rooming Documentation:  Questionnaire(s) completed    Reason for visit: RECHECK    Gerri Peguero VVF  No vitals

## 2025-06-26 NOTE — PATIENT INSTRUCTIONS
Labs in 3 months (with remicade infusions) then every 6 months    Return video visit in about 3-4 months

## 2025-06-26 NOTE — LETTER
2025       RE: Samara Oropeza  [address]  [facility] [place] [address]     Dear Colleague,    Thank you for referring your patient, Samara Oropeza, to the [facility] RHEUMATOLOGY CLINIC [place] at [facility]. Please see a copy of my visit note below.    Virtual Visit Details    Type of service:  Video Visit     3:33-3:37 pm    Originating Location (pt. Location): Home  Distant Location (provider location): Off-site  Platform used for Video Visit: [employer]      Office Visit Details    Rheumatology Clinic Return Visit Patient     Samara Oropeza MRN# [id number]   YOB: 1995 Age: 29 year old     Date of Visit: 2025   Last seen: 3/19/2025         Assessment & Plan    Assessment & Plan  Samara is a 29 year old  female (ADRIAN 21) with Behçet's disease (pathergy, oral/genital ulcers, polyarthralgia) who presents today for RECHECK      # Behçet's disease (pathergy, arthralgias, recurrent oral / genital ulcers)  # s/p delivery on 2023 with high-risk pregnancy, complicated by pre-eclampsia,  birth but healthy baby    10/2022:  Mrs. Oropeza arrives for follow-up of her Behçet's disease that is more active at present with recent decrease of her Otezla (60mg --> 30mg) recommended by [facility]. Previous discussion with her OB providers had been to decrease to the lowest effective dose, clearly 30mg is not effective as she describes worsened oral ulcers, genital ulcers, arthralgias, abdominal pain, and thrombophlebitis. Although there is not a great deal of evidence for Otezla safety in pregnancy, its mechanism of action would suggest it. Counterbalancing these concerns are what we understand of Behçet's in pregnancy which more often improves but in some cases (~29%) becomes worse, and can flare more often in the 1st trimester and post-lorena period.  She also describes some vision changes, which  in the context of Behçet's is pavel. She has not seen Ophthalmology in many years; I will place a referral today.     6/21/2023: Stable today, on otezla 30 mg bid, breastfeeding, MFM is ok with breastfeeding, discussed ACR reproductive guideline, it does not recommend otezla during pregnancy and breastfeeding given lack of data; however Samara remained on it during pregnancy and now breastfeeding as stopping it leads to severe flare of her behcet and benefits of staying on it outweighs risks. Her MFM provider is aware of staying on otezla and is ok with it during breastfeeding.    9/27/23: Doing well, remains on otezla. Has had some interruptions in doses. Today has back and hand pain, and ankle swelling. Will start celebrex, ok with breastfeeding.     2/1/2024:    Behcesarai's oral/vaginal ulcers are under fair control on otezla, will continue. But she continues to have active inflammatory arthritis, she failed celebrex. Will add  mg bid; risks were discussed. HCQ is safe in pregnancy and breastfeeding in case of pregnancy in future . Will check labs.      6/5/2024:    Active inflammatory arthritis despite adding HCQ in 2/2024, HCQ causes SE, fingers are deforming. Recommend to add methotrexate; risks were discussed. Was informed that it is not allowed in pregnancy and breastfeeding. She will start after she is done with breastfeeding in 8/2024. She prefers sub q inj over oral to avoid GI SEs. Will continue otezla to control oral/genital ulcers. I reviewed labs, stable.    9/18/2024:    Continues to have active inflammatory arthritis, plan is to start methotrexate as soon as she is done with breastfeeding; risks were discussed.    Stopped HCQ in 6/2024 given lack of benefit.    Oral/genital ulcers due to Behcet's are under control on otezla.    New itching, skin rashes are ongoing. I will message her derm team as prescribe topical is not covered by insurance.    Plan:    When you stop breastfeeding,  "start methotrexate 0.4 ml weekly under skin injection, our pharmacist Natalie ([place] referral) will call you to teach you how to do it    Start folic acid 1 mg a day to help with methotrexate side effects    Stay on otezla    Labs one month after start of methotrexate    Avoid pregnancy on methotrexate    Return in about 3-4 months in person    1/15/24:    Conversations today reveal alfredo is currently in the midst of a flare, with arthralgia of the hands, neck and ankles. She reports having the flares about once a month. This episode has been ongoing for \"3-4 days\". She also shared a recent diagnosis of cataracts, as well as the onset of blurry vision and floaters of both eyes which began with this flare. Samara is still weaning her child off of breast feeding, and has not been able to trial methotrexate. We discussed starting Imuran once daily and she was open to the suggestion. There was some concern about bruising following Imuran previously, however, she does not recall such a reaction.     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    In light of her eye findings, we also discussed scheduling an urgent ophthalmology visit to rule out uveitis.     Plan:  - Start Imuran 50 mg once daily; risks were discussed. Per ACR reproductive guidelines, it is safe in pregnancy and breastfeeding  - Continue Otezla  - Labs today including HepB/C serolgy as well as Quantiferon TB testing in case of using remicade in future, as well as follow labs in 4 weeks to monitor effects of Imuran  - Ophthalmology referral made        Plan:    Add imuran 1 tab a day to otezla    Labs today and in 4 weeks    Urgent eye clinic appointment    Return video visit in about 3-4 months      3/19/2025:    Active arthritis, no uveitis or active rashes. Oral/genital ulcers fairly controlled on otezla. Did not " tolerate AZA but joint pain improved. Can't do methotrexate, because she is breastfeeding. Remicade is also not the best option as we prescribe low dose methotrexate to prevent allergic reaction. Recommend cimzia as next best option; risks were discussed. Cimzia is safe in pregnancy, breastfeeding.      Plan:    Try cimzia every 2 weeks    Prednisone taper as needed    Keep June appointment with me    Today 6/26/2025:    Added remicade+methotrexate to help with seroneg RA, while continuing otezla for behcet's    Plan:    Continue methotrexate+remicade+otezla    Labs in 3 months (with remicade infusions) then every 6 months    Return video visit in about 3-4 months    TT 30 min was spent on date of the encounter doing chart review, history and exam, documentation and further activities as noted above. Any prior notes, outside records, laboratory results, and imaging studies were reviewed if relevant.    The longitudinal plan of care for the diagnosis(es)/condition(s) as documented were addressed during this visit. Due to the added complexity in care, I will continue to support Samara in the subsequent management and with ongoing continuity of care.      Disclosure: I earn consulting income from [employer], the company that manufactures Otezla. I am not involved directly in promoting otezla or doing research with otezla.    Dominga Sosa MD    Orders Placed This Encounter   Procedures     ALT     Albumin level     AST     Creatinine     CBC with Platelets & Differential                   Medical History   History of Present Illness  Samara Oropeza is a 29 year old female who presents for follow-up of her Behçet's.    Today 6/26/2025:    S/p remicade infusion x 1 dose, on methotrexate 4 tabs qwk, no major SE, felt not well one day after infusion, now resolved    Joint pain continues    On clindamycin for skin lesions over chest    Stable behcet ulcers on otezla      3/19/2025:      -pain in hands is the major  complaint, worse in AM, feels alexx    -was on AZA 50 mg every day, it helped with hand pain, had SE of palpitations and SOB    -no immediate pregnancy plan, still breastfeeding    -3 wk ago, had genital lesions, still gets skin lesions over chest, mouth is ok, still on otezla    -eyes are better on eye drops    -gets headaches, has h/o migraines, worse recently, dizziness today, gets botox inj      1/15/2025   - Currently experiencing a flare with arthralgia in her hands, neck, and ankles, ongoing for 3-4 days    - She also reports new vision and floaters both eyes that began this flare, and an Ophthalmology referral was placed as a result    - Currently weaning from breastfeeding and has not yet trialed methotrexate    - 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    9/18/2024:    -reports being in a flare    -hands are pretty painful, bent 5th fingers    -knuckles swell up in AM and at night, reports 3 hours of AM stiffness    -almost done with breastfeeding, has not started the methotrexate yet    -reports severe itching over legs from knees down to legs, no rash, skin looks darker where she is itching, pretty swollen as well    -she never this kind of itching before, reports this started after back surgery    -had back surgery 4/30/2024, decompression of a nerve, it helped with nerve pain, still feels numb/tingly down in her legs    -gets sports over chest, breast, saw derm, was prescribed topical which is not covered by insurance. Next step would be laser tx    Seen Dr. Marilyn [facility]ology in [facility] [place] 10/14:    Original presentation 2014:     Ms Oropeza is a 19 y.o. female who presents with one year of presentation of painful oral ulcers (monthly) once that have worsened with two episodes in the last two months that presented with painful vulvar or vaginal ulcers (2 episodes so far every month) [not sure if they leave scar] as well that timing coincides with menses (C:  8/25/14).   ORAL ULCERS: last two episodes were severe, painful, white base with erythematous halo, that appear and disappear in the matter of 1-2 weeks without, does not bleed and doesn't respond to any treatment used (magic mouthwash), 10-15 in number with the biggest one being dime size.      VAGINAL ULCERS: 2-3 in number between labia majora and minora that occur simultaneously with oral ulcers, painful, non bleeding ulcers that are erythematous, no pus.      FOLLICULITIS: patient reports having spots in legs specially around ankle and knee, but arms, and neck are affected as well. Small lesions that resemble ingrown hair, most are red erythematous elevated lesions, well demarcated, some with liquid that patient refers as pimple like.      SKIN RASHES: welts and red macules with well defined borders that are pruritic and non-ulcerative on chest, arms and back. Benadryl relieves.      ARTHRALGIAS: In descending order of severity: hips, knees, wrists, shoulders, neck, lumbar, fingers.   C/o morning stiffness in hips, back and neck lasting for about until noon.      Ms. Oropeza reports they present in severe flares and never fully resolve, but do get better. She has seen some swelling and warmth on the affected joints but has not noticed and redness. Has tried Tylenol, ibuprofen, and hydrocodone with not much benefit.      FEVERS: Reports 3-4 sporadic episodes per month of self limited fevers of 38 C that occur during the evenings and associate facial flushing.      HEADACHES: occur daily and are bitemporal and irradiate occipitally, pulsatile in nature, intensity varies from intense to mild, are worsened with movement. On treatment with ibuprofen and somatriptan without any positive results.      VISION CHANGES: Patient reports that suddenly she would only see a gray blotch in her right eyes and would persist like this for a day and a half. Also reports blurriness in her left eye. Presence of pain and burning  sensation of eyeball with associated redness. Has changed prescription glasses in the matter of 2 years 3 times. She has had opthalmology exam and was not suggestive of any evidence of uveitis.   She was also evaluated in ED for a dizzy spell.      ABD PAIN W/ INTERMITTENT DIARRHEA AND CONSTIPATION: Patient reports abdominal pain that is intermittent, periods of 7 days without bowel movement and feeling bloated with change of pattern to diarrhea (liquidy without blood nor mucus, non foul smelling). Has taken Bentyl, Zegrid, and rinetidine with mild clinical improvement.  She also reports small red nodules after each needle stick for blood draw.   Neurology saw her back then and was not impressed with her presentation as physical examination was normal. Also MRI brain normal: Findings: No definite hemorrhage, mass affect, midline shift, or ventriculomegaly is noted.There are a few scattered non specific white matter T2 hyperintensities. Axial diffusion weighted images are unremarkable.     The major vascular structures appear patent. There is opacification and severe mucosal thickening in the right maxillary sinus. The remaining paranasal sinuses, and mastoid air cells are clear. Orbits are unremarkable  She has + HSV-1 IGG. Seen ID. Negative for Herpes simplex virus culture. HSV IGM I/II COMBINATION SENT TO [facility]  RESULTS = <0.91  REF RANGE: <0.91 = NEGATIVE  ID did not think HSV could explain her mouth and genital sores.      CRP within normal limits. HIV negative. CBC within normal limits; UA negative. Creatinine within normal limits   CYNDIE neg.  Antigliadin neg.   ANti TTG neg.   [facility] 2014: Colonoscopy and endoscopy neg; result not available. Not sure if biopsies were done.   Raynaud's phenomenon:  Onset: about 2013  Digits: all fingers  Digital ulcers: no  Color changes: white ---> purple and red  Duration of an attack: About couple of hours per mom  Pain during attack: not clear pain but bruise like  feeling  Frequency of attacks: few times a month  Family history of Raynauds: no  GERD: very long time     No hemoptysis.   No miscarriages/ no thrombosis in past.   No family or personal history of psoriasis, ulcerative colitis or chron's disease.   No buttock pain or low back pain or stiffness in AM     Interim history:  Dec 2014- Multiple mouth ulcers and did not eat for 5 days. This coincided with periods. Colchicine 0.6mg PO BID started in Nov 2014.   Prednisone taper in Dec 20mg to 0 in 4 weeks. She also used magic mouthwash. Kenalog cream. After going to the ER, 3 days later her ulcers were better. She thinks it improved after the menstruation stopped.   LMP 3 weeks ago.   COLCHICINE HAS HELPED A LOT REDUCING THE INTENSITY OF MENSTRUATION ASSOCIATED ORAL AND VULVAR ULCERS.      Interim history: 6/15     Since last visit only two episodes of mouth sores- small sized 6   No genital ulcers since last visit.   Colchicine 1.2 mg in AM and 0.6mg in PM keeps her symptoms under control.      Admitted in 5/15:  Admission Diagnoses:  - LUE weakness  - spell  - hx of migraines  - hx of Tourette syndrome  - hx of Behcet's disease     Discharge Diagnoses:   - spell likely 2/2 anxiety  - hx of migraines  - hx of Tourette syndrome  - hx of Behcet's disease     CT and MRI brain was normal.      Seeing Dr. Lilliana Miramontes soon as outpatient.      Dr. Garcia did not find any intraocular inflammation.       Interm 10/30/2015  ED for migraine. Continues to see neuro - MRI brain without lesions noted. Saw GI - upper barium normal. May be considering repeat colo. Worsening lesions in mouth and genitals. Has had continuous lesion in mouth x 2 months. 2 genital ulcers. Had fracture of right sesamoid in foot. Continues to have low grade fevers. New folliculitis on chest, legs and arms.      Interim hx: 1/11/2016    Pt states that since last visit she continues to have oral ulcers and has noted more folliculitis-like lesions on her  "lower extremities.  She states that on her right lower leg she had a red macular spot that has since resolved.  She denies uveitis.  She states that the colchicine initially helped but then stopped working.  She takes 0.6 mg TID.  She stopped taking her prednisone last week as she feels like this hasn't helped.  She hasn't had any episodes of vaginal ulcers.      She saw GI who stated she has gastroparesis.  She is seeing Cardiology later this week for possible syncopal episodes.       Interim history 5/16  Mouth ulcers X 1 last month  Genital ulcers - none since last visit.      Bilateral MCPs pain, knee pain and low back pain +ve increased since last visit  Morning stiffness X 2 hours (increasing)   5-6/10     \"overall myalgia\" has gotten worse    C/o pseudofolliculitis lesion on anterior shins bilaterally worse     Interim history: (7/18/2016)  Patient has just started Humira for 2 doses on 7/1 and 7/15 and reported minimal improvement of her hip pain but otherwise, did not notice anything different.      She reported painful mouth ulcer once a month since last visit but noticed that it has been getting deeper.   Denied any genital ulcers.     Still has ongoing bilateral MCPs pain, knee pain but this has been intermittent and she did not have any much pain today. She reported 2-3 hours morning stiffness of both hands and pain score of 6/10 at her knees and 8/10 of her hands but currently takes no pain medication except prn ativan which she takes when her muscle is really tight.      The only concern is that she gained weight even though she has not been eating much (eat once a day) and do exercises every day for 1 hour (with video of yoga, pilates). Her mother wonders if she needs to have some labs work up include sex hormone, thyroid, cortisol.     Interval history 9/14/16  Patient was started Humira at the last visit, patient reports worsening of skin ulcers since starting Humira and stopped taking Humura for " the past 2 weeks. Patient reports oral and genital ulcers has been stable even before starting Humira and has not had any interval oral/genital ulcers. However she reports recurrent skin ulcers occurring on a daily basis that affects her chest and anterior legs. Patient also has stopped taking prednisone, she reports that she doesn't feel the prednisone helps, her last dose of prednisone was 6+ months ago. Patient also report worsening low back pain that sometimes causes her to limp.     In the interval patient also visited ED on 8/31/16 for left hand pain and what the patient describes as phlebitis, no medication or procedure was done and the patient was discharged with a splint. Patient also reported 1 episode of chest pressure that was associated with dyspnea and numbness of the left arm, she was shopping in a supermarket at the time of onset, and the pressure resolved after she took a nap.     Patient denies any infectious symptoms in the interval, no fever, chills, night sweat, cough, dysuria, denies eye pain or visual changes.     November 4, 2016  Oct - had one genital ulcer  Oral ulcers X 5- around menstruation time.   Knee pain- chronic on the left side  Dizziness spells - on and off; been to the ER for that in the past.   On and off migraines  July 2013 - s/p MPFL reconstruction plus LRL 7/2013  Morning stiffness in knee (left) X 1 hour     Severe jaw pain and chest pain- patient wondering if this is Tourette's or esophageal spasms. Cardiac workup negative.   Fever      January 13, 2017  Yesterday in ER [facility] [place] with orthostatic syncope from dehydration.   Chest pain on and off still continues. Painful with deep breaths.   Oral ulcers - few minor ones lasting for one week;   One major one in roof of mouth lasting for about one week.   Knee pain +ve - reviewed the recent MRI with her orthopedic surgeon.   On and off migraines  otezla is approved. Still waiting to receive the meds.      March 31, 2017  Otezla  current dose 15mg PO BID.   She is tolerating okay at this dose and is willing to increase the dose further up to 30mg BID.   Her joint pains are better on otezla. Knee pain is also better.   Back and neck pain +ve 8/10 when it is worse. Intramuscular botox injections were given on Monday in PMR.   2 minor genital ulcers in the interim- resolved.   Few oral ulcers in the interim- resolved.   Nasal ulcer - resolved.   Migraines on and off.   Nausea with otezla but improving.   Dizziness and blackouts on and off. She fell once and hurt her ankle. Wearing boot in left leg.   Complete ROS negative except for above     May 17, 2017  Tolerating otezla better. Not throwing up anymore. Current dose 20mg in AM and 30mg in PM (2nd week).   Patient thinks that her oral ulcers frequency and severity are improving with otezla. Also no genital ulcers in the interim.   Currently on doxycycline for bronchitis/?pneunomia. 4 more days left.      Joint pain history  2 weeks ago/ dx with pneumonia 1 week ago/  Involved joints: all over  Pain scale: 6.5/10   Wakes the patient from sleep : Yes  Morning stiffness: Yes for 120 minutes  Meds used:otezla,colchicine     Interim history  Since last visit:  1. Infections - Yes/ pneumonia  2. New symptoms/medical problem - Yes/ thinks she may have had a mini-seizure about 10 days ago ; did not seek medical attention; did not reoccur after that. Patient seeing PCP today.  3. Any side effects from Rheum medications -vomiting a lot (resolved)  3. ER visits/Hospitalizations/surgeries - Yes  4. Last PCP visit: has an apt. today     Patient still getting double vision. Floaters present.      Therapist recommended psychiatry evaluation. Patient does not want to see psychaitry. Patient will see PCP today.      August 22, 2017  Have you ever seen a rheumatologist Yes Who You When 5/17/17     NO genital ulcers in the interim.   Mouth ulcers- three in the back of the throat and roof of the mouth last  month.      Joint pain history  Onset: doing alittle worse / cut her otezla back to 30mg  Daily due to GI problems  Involved joints: all over her body. Been having more black out episodes, been having more chest pains.also has raised bumps on both legs from the knees down that itch and are painful   Pain scale:  5.5/10     Wakes the patient from sleep : Yes  Morning stiffness:Yes for 120 minutes  Meds used:otezla, colchicine (three pills daily)     Interim history  Since last visit:  1. Infections - Yes stomach issue  2. New symptoms/medical problem - No  3. Any side effects from Rheum medications -nausea from otezla  3. ER visits/Hospitalizations/surgeries - Yes, ER for foot, ankle injury from passing out and fell down the steps. Hit her head another time from passing out. Alcohol poisoning in July 4. Last PCP visit: 6/23/17 September 19, 2017  Have you ever seen a rheumatologist Yes Who You When 8/22/17  Joint pain history  Onset: pt states that she hurts everywhere for 6 days  Involved joints: all joints  Pain scale:  7/10     Wakes the patient from sleep : Yes/ not sleeping at all  Morning stiffness:Yes for 180 minutes  Meds used:colchicine, otezla, magic mouth wash, lidocaine gel  Last one week: increased joint pain; and genital ulcer  Genital lesion (dimed size) in the last one week  After botox a week ago, she had fever 101 for three days; near syncopes. Back pain; floaters  Chest pain , mouth sores, hand pain, morning pain were all better with otezla 30mg twice daily.     Interim history  Since last visit:  1. Infections - No  2. New symptoms/medical problem - No  3. Any side effects from Rheum medications -nausea from the otezla  3. ER visits/Hospitalizations/surgeries - No  4. Last PCP visit: may 2017      October 18, 2017     Have you ever seen a rheumatologist Yes Who You When 9/19/17  Joint pain history  NO mouth or genital sores in the last 4 weeks.   Medrol dosepak helped with visual symptoms but  not joint pains.      Onset: pt states that she is doing better than last time with her behcet's. Today has severe stomach pains, states that she is constipated. Pt had a seizure 2 days ago. Does have a metallic taste in her mouth  Involved joints: hands , neck, shoulders  Pain scale:  9/10 from stomach pain;      Wakes the patient from sleep : Yes  Morning stiffness:Yes for 120 minutes  Meds used:cochicine , otezla 30mg twice daily     Interim history  Since last visit:  1. Infections - No  2. New symptoms/medical problem - Yes/ the cartilage in her chest is inflamed  3. Any side effects from Rheum medications -nausea from the otezla  3. ER visits/Hospitalizations/surgeries - No  4. Last PCP visit: yesterday     January 16, 2018  Have you ever seen a rheumatologist Yes Who You When 10/18/17  Joint pain history  Onset: pt states that she was in the hospital for 5 days before maribell, they told her she had lesions in her brain in the white matter. Had blood work drawn in the ER and they told her her inflammatory markers were elevated. Was seen in the ER last week for vomiting and diarrhea, not eating. Saw Gastro. On 1/10/18. 1.5 weeks ago she had an endoscopy and colonoscopy. She has been having elbow  And hands and knee pain, loosing use of her hands. Been dropping things. Also states that she has been getting lesions up her nose  Involved joints: see above  Pain scale:  8/10     Wakes the patient from sleep : Yes  Morning stiffness:Yes for 120 minutes  Meds used:colchisine, otezla, magic mouth wash, kenolog cream, lidocaine gel     Interim history  Since last visit:  1. Infections - Yes/ had an infection in her jaw. Having sinus issues  2. New symptoms/medical problem - Yes/ states that she is having problems walking, states that she was bouncing when she was walking  3. Any side effects from Rheum medications -otezla, nausea  3. ER visits/Hospitalizations/surgeries - Yes/ see chart  4. Last PCP visit: November  2017 April 17, 2018  Have you ever seen a rheumatologist Yes Who You When 1/16/18  Joint pain history  Onset: pt states that she is not doing well. She is having increased stomach issues, only eats 2 times in 4 days unable to keep the otezla down due to vomiting . Has sleep study done in 1 week; no genital ulcers since last appointment. 6 small mouth sores since last appointment.   Involved joints: see above   Pain scale:  7/10     Wakes the patient from sleep : Yes  Morning stiffness:Yes for 60 minutes  Meds used:otezla , colchicine, diclofenac gel, magic mouthwash, lidocaine gel      Interim history  Since last visit:  1. Infections - Yes/ had a sinus infection, thinks she is getting sick now  2. New symptoms/medical problem - Yes/ neurology sent pt to cardiology. Has a heart condition but not sure what . She is seeing a ortho. Due to knee pain   3. Any side effects from Rheum medications -nausea/vomiting from the otezla   3. ER visits/Hospitalizations/surgeries - Yes/ seen in ER   4. Last PCP visit: yesterday     July 17, 2018  Have you ever seen a rheumatologist Yes Who You When 4/17/18  Joint pain history  Onset: pt Is here for a follow-up states that she started to get lesions on her legs , face and arm. Hurts all over, lower back is very painful. Right hand and wrist area are numb  Involved joints: see above  Pain scale:  7/10   worse in the morning  Wakes the patient from sleep : Yes/ does not go to sleep till late  Morning stiffness:Yes for 4-5 hours minutes  Meds used:colchicine, otezla has  Not started otezla yet due to extreme nausea      Interim history  Since last visit:  1. Infections - Yes/ had a bacterial infection in her pelvic area was hospitalized  2. New symptoms/medical problem - Yes/ hands are swelling up. Having orthopedic issues. Was having problem with her veins sticking up, and very painful. Has happened multiply times   3. Any side effects from Rheum medications -see above  3. ER  visits/Hospitalizations/surgeries - Yes/ hospital and ER for bacterial infection  4. 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 ER said may have blood clot in calf, but when did MRI, stated body may have broken it up on it's own. Elevated D-dimer  Involved joints: Knees, neck, hands  Pain scale:  6.5/10     Wakes the patient from sleep : Yes  Morning stiffness:Yes for 180 minutes  Meds used:hyoscyamine, not taking otezla. Last dose Sep. Patient did not want to take otezla with the antibiotics.      Last major mouth ulcer- aug or Sep  No genital ulcers in the last 6 months.      Interim history  Since last visit:  1. Infections - Yes, UTI's twice a month since last visit  2. New symptoms/medical problem - No  3. Any side effects from Rheum medications -otzela causes nausea  3. ER visits/Hospitalizations/surgeries - Yes, 1/2/19 repaired some ligaments and mass taken out, also joint build up removed from a previous injury  4. Last PCP visit: 12/5/19 June 12, 2019  Have you ever seen a rheumatologist yes Who you When 1/9/19  Joint pain history  Onset: Patient is here for a follow up. A lot of infections and in and out of the hospital, who wanted her to follow up with Rheumatology again.  Involved joints: knees, legs, back, neck, shoulders, ankles  Pain scale:  6.5/10     Wakes the patient from sleep : Yes  Morning stiffness:Yes for 120 minutes  Meds used:magic mouthwash, colchicine     Interim history  Since last visit:  1. Infections - Yes, staph  2. New symptoms/medical problem - kidney failure  3. Any side effects from Rheum medications -none  3. ER visits/Hospitalizations/surgeries - Yes, 3 hospitalizations, and surgeries,  4. Last PCP visit: 6/11/19 9/30/2020: New to me, establishing care. Flaring off otezla.     816   Reports worsening oral  ulcers and joint pain and skin rash.     No vaginal ulcers currently. Last ones were 5 months ago.     Gets oral ulcers monthly, not today, had them last few days ago. They come up as flare up around period time. They self-resolve.     Thinks colcrys is helping but not enough, lost some benefit. No longer has diarrhea from it. the dose is 0.6 mg bid.     Came off otezla last year when she had severe staff infection, there was drug drug interaction with vancomycin. Wants to go back on it.     Skin lesions over chest are clearing up. Has skin lesions over her legs.     She is off of otezla >1 yr. She had daily vomiting and abdominal cramps initially which later resolved after 2 months.      Today, is her last day liz her health insurnaace  .     Reports pain and swelling liz hands. Drops things, lost strenghth. Veins look inflamed. Hands are swollen in AM and before bedtime. AM stiffness is 2 hours. can t tolerate ibuprofen.     Prednnisonne does not help much.     Wants to try eleve.     Had 2 blood clots over L arm, finished xraalto 4 months ago.      Was told to have severe dry eyes, hs not had eyes checked> 1 yr as was in the hospital with staff infection. systanee nd gentle eyedrops help some, sometimes gets sharp pain and and blurry vision in her eyes from dryness. No h/o uveitis.     has dry mouth, drinks water.     Gets T  F sometimes. It is sporadic.     Lost hair.     Gets intermittent CP. Had several hospital visits and admissions in the [facility] for it. lorazepam helped witch chest spasms, she does not like pain meds.     She was prescribed 0.5 mgq d prn, 10 tbs/monthss.     She gets dry cough, just started using albuterol inhaler, it helps.     No SOB.     Has gastroparesis, IBS  nd h/o severas admission for constiption, colon blockage with impaction and gets bloating. has lost follow up with GI.     She is working with  to get medical assistance to get insurance back by early next year. She  filed it again.     Gets botox inj every 3 months nd they help, has to cancel 10/20 botox inj s will not have insurance. they came back yesterday.     last seizure waas last year. still gets black out spells, salts ne was last week.    derm eye gi       FHx: Both parents have RA.  SHx: She was working in a [employer] shop, it was closed because of pandemic. sexually active, not on oral contraceptives. She thinks she had a misccraaaiaage last wk, had n period x2 months then mueller bleeding a lot, dark red and was different from period. Felt something came out that she feels she miscarried, no pos pregnancy test. No smoking. rarely drinks ETOH.  [facility]   thumbs between MCP tender   otezla helped with skin lesions, oral ulcers, joint pain.  colcrys  sjjhoanae alondra mercedes [phone]        8/11/2022: Samara presents for urgent visit to discuss m/o Behcet's flare during pregnancy, she is   She is pregnant 10 wk 3 days with ADRIAN 3/6/2023.  In 2020, had 1st trimester miscarriage.  Has LBP, ankle pain/swelling.   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 Discussed with MFM, back on low dose otezla since last week, only 1 tab a day.    Interval History 10/28/2022  Mrs. Oropeza was last seen 08/11/22. At that time she was advised to continue otezla (1 tablet BID) and remain off colcrys. Since that time, she has instead taken otezla 1 tablet daily. She notes more joint pain in hands, wrists, cervical spine, knees with 4 hours of monring stiffness. Previously <1hr with full strength.     Had a few genital ulcers recently which only lasted a few days. Oral ulcers have been more active of late and are currently healing.     Abdominal pain on L side, pain is always worse after eating. Out of the phase of her pregnancy with morning sickness, at 22w on Monday.     She describes a few fevers at the end of September with hot flashes and cold sweats with  Tmax 100. Called OB-GYN who instructed she should go in if persistent, though it resolved within a few days.     More easy bruising - thighs, she is currently on lovenox and baby aspirin. Previous history of clots especially with interventions. She recently had an issue with superficial thrombophlebitis following a blood draw.     Seeing more floaters in her vision. Sometimes tiny and clear, but can also be larger and 'gray', up to 1/4 of her vision. Has not seen Ophthalmology in many years.     More frequent migraines - did a nerve block recently but this has worn off, previous botox injections.     ADRIAN 03/06/23.    1/25/23:    Baby is growing small. Had high BP yesterday, they would monitor it. DBP was 90.    Increasing otezla to bid helped. It helped with ulcers. Has skin breakdown since Christmas, never had it like this over her chest, but still it feels related to Behcet.    Hands and ankles are painful and swoleln, ankles are new but had hand pain with behcet before,. This started fater entering [address] [place] [address]. Her ADRIAN might be earlier based on baby's growth, induction at 37 wk, progress at 39. nex twk will have another check.    Still has the neck pain. Still gets eye floaters.    6/21/2023:    She delivered 1 month early due to pre-eclampsia. Had Mg infusion, had bleeding issues, spent a week. Was on BP med till a month ago, now stable. Was induced, had NVD. No blood transfusions needed.    Her baby girl Lashanda was born 2/12/2023, small, slow growth but healthy with no fetal deformities. Had vaginal sores after giving birth. Still gets mouth ulcers, one oral ulcer now, no vaginal ulcers now. Plans to breast feed x 7 months. Does breastfeeding. Saw OB/GYN in 4/2023 [facility]. They are aware she is on otezla.    She was on ASA 81 mg every day during pregnancy.    9/27/23: Doing well, less behcet flares since giving birth. Most recent flare on her chest, now healing. No new oral or vaginal ulcers.     She  has been having back pain that shoots down her right leg since June and has been utilizing PT without improvement.     She also has had swelling and pain in both hands and right wrist, as well as both ankles L>R.     She notes missing some doses of otezla due to running out of refills and avoiding taking it without food. She has been eating less because she has been more tired since having her daughter.       2/1/2024:    Has back pain. Had epidural inj one wk ago, has to wait x 2 weeks, if no help to get surgery for bulging disc    Some oral lesions 2wk ago flare with swollen hands, but currently has no oral ulcers.     Hands hurt today with pain level about 4/10. Celebrex did not help much.    No vaginal ulcers    Reports loss of appetite past 5 months    Her baby girl was sick x past 2 weeks, just got better last night, she had RSV. Samara did not get much sleep, did not eat much over past 2 weeks because of this.      6/5/2024:    -active inflammatory arthritis despite adding HCQ last visit. HCQ causes GI upset    -fingers are deforming, new    -not feeling well, has neck pain, nausea x past few days    -still breastfeeding, no pregnancy plans    -missed otezla x 2 days, got some skin lesions back over chest area; otherwise otezla is managing her behcet's sores well    Review of Systems   A comprehensive ROS was done. Positives are per HPI.    Past Medical History  Past Medical History:   Diagnosis Date     Anemia      Anxiety      Arthritis      Behcet's disease (H)      Cervical adenitis May 2010     Chronic abdominal pain      Constipation, chronic 1994     Fibromyalgia      Gastro-oesophageal reflux disease      Gastroparesis      Irregular heart beat     tachycardia, has had workup     Migraines      Neuromuscular disorder (H)     fibramyalgia     Palpitations      PONV (postoperative nausea and vomiting)      Seizure (H)      Seizures (H)     unknown etiology     Syncope      Tourette's       Past  Surgical History:   Procedure Laterality Date     ARTHROSCOPY ANKLE, OPEN REPAIR LIGAMENT, COMBINED Left 9/25/2019    Procedure: LEFT ANKLE ARTHROSCOPY WITH LIGAMENT REPAIR;  Surgeon: Andres Johnson DPM;  Location: [facility]     ARTHROSCOPY ANKLE, REPAIR LIGAMENT Left 1/2/2019    Procedure: Ankle arthroscopy and sinus tarsi evacuation, ligament repair, left lower extremity;  Surgeon: Andres Johnson DPM;  Location: [facility] OR     ARTHROSCOPY KNEE WITH PATELLAR REALIGNMENT  7/25/2013    Procedure: ARTHROSCOPY KNEE WITH PATELLAR REALIGNMENT;  Left Knee Arthroscopy, Medial Patellofemoral Ligament Reconstruction with Allograft  ;  Surgeon: Jennifer Acevedo MD;  Location: [facility]     COLONOSCOPY  2015     DENTAL SURGERY  1996    Teeth removal     ENDOSCOPY UPPER, COLONOSCOPY, COMBINED  2005     HC ESOPH/GAS REFLUX TEST W NASAL IMPED >1 HR N/A 2/15/2017    Procedure: ESOPHAGEAL IMPEDENCE FUNCTION TEST WITH 24 HOUR PH GREATER THAN 1 HOUR;  Surgeon: Timothy Matta MD;  Location: [facility]     IR LUMBAR PUNCTURE  1/22/2024     IR PICC PLACEMENT > 5 YRS OF AGE  3/13/2019     IR UPPER EXTREMITY VENOGRAM LEFT  2/25/2020     IRRIGATION AND DEBRIDEMENT FOOT, COMBINED Left 3/12/2019    Procedure: COMBINED IRRIGATION AND DEBRIDEMENT LEFT ANKLE;  Surgeon: Micha Glover MD;  Location: [facility]     IRRIGATION AND DEBRIDEMENT LOWER EXTREMITY, COMBINED Left 5/7/2019    Procedure: 1.  Excision of wound down to and including deep fascia, less than 20 cm2.  2.  Irrigation and debridement, left ankle.;  Surgeon: Andres Johnson DPM;  Location: [facility] OR     PICC INSERTION Left 05/05/2019    4Fr - 45cm (5cm external), Basilic vein, SVC RA junction      Patient Active Problem List    Diagnosis Date Noted     Cough 04/02/2025     Priority: Medium     Created by Conversion       Tinnitus 04/02/2025     Priority: Medium     Created by Conversion        Replacement Utility updated for latest IMO load       History of gingival  bleeding 04/26/2023     Priority: Medium     Vaginal pain 04/26/2023     Priority: Medium     Urge urinary incontinence 04/19/2023     Priority: Medium     Anemia 02/20/2023     Priority: Medium     Chronic hypertension 02/20/2023     Priority: Medium     History of pre-eclampsia 02/20/2023     Priority: Medium     Preeclampsia 02/10/2023     Priority: Medium     Dysthymic disorder 10/13/2022     Priority: Medium     Created by Conversion       History of DVT (deep vein thrombosis) 08/23/2022     Priority: Medium     History of anemia 08/10/2022     Priority: Medium     De Quervain's tenosynovitis, right 03/10/2022     Priority: Medium     Added automatically from request for surgery [id number]       Trigger finger of right thumb 03/10/2022     Priority: Medium     Added automatically from request for surgery [id number]       Infection of joint of ankle (H) 05/06/2019     Priority: Medium     Cellulitis 03/09/2019     Priority: Medium     Displacement of lumbar intervertebral disc without myelopathy 11/13/2018     Priority: Medium     Overview:   Created by Conversion       Iron deficiency associated with nonfamilial restless legs syndrome 11/13/2018     Priority: Medium     Enthesopathy of hip region 11/13/2018     Priority: Medium     Overview:   Created by Conversion       Tourette's syndrome 11/13/2018     Priority: Medium     Overview:   Created by Conversion       Somatic symptom disorder 06/01/2018     Priority: Medium     Pelvic floor weakness 04/25/2018     Priority: Medium     Spells of decreased attentiveness 12/19/2017     Priority: Medium     Mobile cecum (H) 11/09/2017     Priority: Medium     Cecum noted in Right lower quadrant on 4/17 CT scan, and in Left upper Quadrant on CT on 11/2017.       Vitamin D deficiency 10/11/2017     Priority: Medium     How low, unknown/not found. On D when tested at 27. Starting cholecalciferol October 2017. Needs recheck.       Convulsions, unspecified convulsion type (H)  10/03/2017     Priority: Medium     Transient alteration of awareness 10/03/2017     Priority: Medium     Chronic pain syndrome 07/27/2017     Priority: Medium     Major depressive disorder, recurrent episode, moderate (H) 06/27/2017     Priority: Medium     Cervical pain 05/02/2017     Priority: Medium     Acute left ankle pain 03/31/2017     Priority: Medium     Cervical dystonia 03/28/2017     Priority: Medium     PTSD (post-traumatic stress disorder) 01/17/2017     Priority: Medium     Patellofemoral instability 10/20/2016     Priority: Medium     Fibromyalgia 08/04/2016     Priority: Medium     Rheumatoid arthritis of multiple sites without rheumatoid factor (H) 08/04/2016     Priority: Medium     Raynaud's disease without gangrene 08/04/2016     Priority: Medium     Chronic abdominal pain 08/04/2016     Priority: Medium     Palpitations 01/12/2016     Priority: Medium     On colchicine therapy 10/30/2015     Priority: Medium     Spell of shaking 05/06/2015     Priority: Medium     Migraine 02/04/2015     Priority: Medium     Behcet's disease (H) 12/10/2014     Priority: Medium     Headaches due to old head injury 11/12/2013     Priority: Medium     Milk protein intolerance 10/11/2013     Priority: Medium     Intestinal malabsorption 10/11/2013     Priority: Medium     Concussion 02/13/2013     Priority: Medium     Jan 2013, with prolonged recovery- followed by sports med         Knee pain 01/03/2013     Priority: Medium     Generalized anxiety disorder 06/25/2009     Priority: Medium     Anxiety state 06/25/2009     Priority: Medium     Created by Conversion    Replacement Utility updated for latest IMO load       Tics - Tourette syndrome 05/18/2009     Priority: Medium     Followed by psychotherapy. Symptoms well managed. Originally diagnosed at [facility]. (Dr. Simpson)           IBS (irritable bowel syndrome) 05/18/2009     Priority: Medium     Allergic rhinitis 05/18/2009     Priority: Medium      GERD (gastroesophageal reflux disease) 01/10/2008     Priority: Medium     Gastroparesis 1994     Priority: Medium      Family History   Problem Relation Age of Onset     Depression Mother      Neurologic Disorder Mother         Migraines, take imitrex injection.  Also in maternal grandmother.       Alcohol/Drug Father      Hypertension Father      Depression Father      Osteoarthritis Father      Cardiovascular Maternal Grandmother      Depression Maternal Grandmother      Hypertension Maternal Grandmother      Alzheimer Disease Maternal Grandmother      Cardiovascular Maternal Grandfather      Hypertension Maternal Grandfather      Depression Maternal Grandfather      Alcohol/Drug Maternal Grandfather      Diabetes Maternal Grandfather      Cardiovascular Paternal Grandmother      Hypertension Paternal Grandmother      Cardiovascular Paternal Grandfather      Hypertension Paternal Grandfather      Glaucoma No family hx of      Macular Degeneration No family hx of       Allergies   Allergen Reactions     Amoxil [Penicillins] Rash     Dad unsure of reaction.     Bee Venom Anaphylaxis     Bioflavonoids Anaphylaxis     Citrus Anaphylaxis     All [place]     Contrast Dye Rash     Contrast Media Ready-Box [facility], 04/09/2014.; Contrast Media Ready-[facility], 04/09/2014.  NOTE: this is a contrast media oral with iodine. Premedicate with methylpred standard for IV contrast, request barium contrast for oral contrast.     Iodinated Contrast Media Hives and Rash     Contrast Media Ready-[facility], 04/09/2014.; Contrast Media Ready-[facility], 04/09/2014.  NOTE: this is a contrast media oral with iodine. Premedicate with methylpred standard for IV contrast, request barium contrast for oral contrast.     Pineapple Anaphylaxis, Difficulty breathing and Rash     Reglan [Metoclopramide] Other (See Comments)     IV dose only, in ER, rapid heart rate.     Ace Inhibitors      Difficulty in breathing and GI upset     Amitiza  [Lubiprostone] Nausea and Vomiting     Amoxicillin-Pot Clavulanate      Midazolam Unknown     parent states that when pt takes this medication, she wakes up being very violent .     Midazolam Hcl      Coming out of pelvic exam at age of 5, was kicking and screaming when coming out of the versed.     Other [No Clinical Screening - See Comments]      Bleech/ chest tightness, itchy throat, swollen tongue, hives     Tizanidine Other (See Comments)     Confusion, back pain, photophobia, abdominal pain, shaking, anxious       Adhesive Tape Rash     Azithromycin Hives and Rash     Cephalexin Itching and Rash     Sulfa Antibiotics Rash     Skin scarring      Current Outpatient Medications   Medication Sig Dispense Refill     amitriptyline (ELAVIL) 25 MG tablet TAKE ONE TABLET BY MOUTH AT BEDTIME 90 tablet 1     amitriptyline (ELAVIL) 25 MG tablet Take 1 tablet (25 mg) by mouth at bedtime. 90 tablet 1     apremilast (OTEZLA) 30 MG tablet Take 1 tablet (30 mg) by mouth 2 times daily. 60 tablet 11     citalopram (CELEXA) 20 MG tablet Take 1 tablet (20 mg) by mouth daily 90 tablet 1     albuterol (PROAIR HFA/PROVENTIL HFA/VENTOLIN HFA) 108 (90 Base) MCG/ACT inhaler Inhale 2 puffs into the lungs every 6 hours as needed for shortness of breath / dyspnea or wheezing 1 Inhaler 1     artificial tears OINT ophthalmic ointment 0.5 inch strip each eye at night 1 Tube 11     augmented betamethasone dipropionate (DIPROLENE-AF) 0.05 % external ointment Apply topically 2 times daily. 50 g 3     azelaic acid (FINACIA) 15 % external gel Apply topically at bedtime. 50 g 11     azelaic acid (FINACIA) 15 % external gel Once daily to scars, upper chest and small portion of back and spot on belly button. Hold if irritating 50 g 5     benzocaine (AMERICAINE) 20 % external aerosol Apply to perineum four times daily as needed for pain 57 g 3     benzocaine (TOPICALE XTRA) 20 % GEL Apply 1 g to affected area 4 times daily as needed for mouth sores.  30 g 5     betamethasone valerate (VALISONE) 0.1 % cream Apply topically 2 times daily as needed        bisacodyl (DULCOLAX) 5 MG EC tablet Take 2 tablets (10 mg) by mouth daily as needed for constipation 30 tablet 0     brimonidine (ALPHAGAN) 0.2 % ophthalmic solution Place 1 drop into both eyes daily as needed (For reduced glare with nighttime driving). 5 mL 1     EPINEPHrine (EPIPEN 2-EAMON) 0.3 MG/0.3ML injection Inject 0.3 mLs (0.3 mg) into the muscle as needed for anaphylaxis 0.6 mL 3     etonogestrel (NEXPLANON) 68 MG IMPL Inject 68 mg Subcutaneous       fluocinonide (LIDEX) 0.05 % external gel Apply topically 2 times daily. Intra-oral 60 g 11     fluocinonide (LIDEX) 0.05 % external gel Apply topically 2 times daily. 60 g 11     folic acid (FOLVITE) 1 MG tablet Take 1 tablet (1 mg) by mouth daily. 90 tablet 3     gabapentin (NEURONTIN) 300 MG capsule Take 1 capsule (300 mg) by mouth every morning 60 capsule 1     hydroquinone (ARACELY) 4 % external cream Apply once daily for 3 months. Stop for one month. If further lightening is desired, proceed with one additional month of treatment. 28 g 1     hydrOXYzine HCl (ATARAX) 25 MG tablet TAKE ONE OR TWO TABLETS BY MOUTH EVERY SIX HOURS AS NEEDED (Patient not taking: Reported on 6/24/2025)       lactulose 20 GM/30ML SOLN Take 30 mLs by mouth 3 times daily as needed (for constipation) 300 mL 3     lanolin ointment Apply topically every hour as needed for other (sore nipples) 7 g 3     lidocaine (LMX4) 4 % external cream Apply topically once as needed for mild pain 120 g 1     lifitegrast (XIIDRA) 5 % opthalmic solution Place 1 drop into both eyes 2 times daily. 180 each 3     LINZESS 290 MCG capsule TAKE 1 CAPSULE BY MOUTH EVERY DAY IN THE MORNING BEFORE BREAKFAST 90 capsule 0     LORazepam (ATIVAN) 1 MG tablet Take 1 tablet by mouth every 8 hours as needed.       methotrexate sodium 50 MG/2ML SOLN Inject 0.4 mLs (10 mg) as directed once a week. 8 mL 5     mometasone  "(ELOCON) 0.1 % external ointment Apply topically 2 times daily. 45 g 11     ondansetron (ZOFRAN ODT) 8 MG ODT tab Take 1 tablet (8 mg) by mouth every 8 hours as needed 90 tablet 3     polyethylene glycol (MIRALAX/GLYCOLAX) powder Take 1 capful by mouth 3 times daily       predniSONE (DELTASONE) 5 MG tablet 15-10-5 mg a day, each course x  5 days then stop as needed for flare ups (Patient not taking: Reported on 6/24/2025) 30 tablet 2     Prenatal MV-Min-Fe Fum-FA-DHA (PRENATAL 1 PO)  (Patient not taking: Reported on 6/24/2025)       rizatriptan (MAXALT-MLT) 5 MG ODT DISSOLVE 1 OR 2 TABLETS IN THE MOUTH AS NEEDED FOR HEADACHE AT ONSET OF MIGRAINE, MAY REPEAT IN 2 HOURS AS NEEDED. MAX 30MG IN 24 HOURS AND MAX 5 DAYS PER MONTH       Sharps Container MISC Use for methotreaxte shots (Patient not taking: Reported on 6/24/2025) 1 each 11     sodium fluoride 1.1 % CREA Apply 1 Application topically daily (Patient not taking: Reported on 6/24/2025)       sucralfate (CARAFATE) 1 GM/10ML suspension Take 10 mLs (1 g) by mouth 4 times daily (Patient not taking: Reported on 6/24/2025) 1200 mL 2     syringe/needle, sisp, 25G X 5/8\" 1 ML MISC Inject 1 Syringe subcutaneously every 7 days. Use with Methotrexate 12 each 3     triamcinolone (KENALOG) 0.1 % external ointment Apply twice daily as needed to lesions on the genitals and body. OK to use very sparingly on the face. 454 g 2     White Petrolatum-Mineral Oil (ARTIFICIAL TEARS) 83-15 % OINT Apply 1 g to eye at bedtime. Apply to each eye at night at bedtime. 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  CBC RESULTS: Recent Labs   Lab Test 09/07/20  1147   WBC 4.1   RBC 5.09   HGB 13.0   HCT 43.0   MCV 85   MCH 25.5*   MCHC 30.2*   RDW 15.0        TSH   Date Value Ref Range Status   09/07/2020 0.77 0.40 - 4.00 mU/L Final   03/21/2019 0.53 0.40 - 4.00 mU/L Final   10/30/2018 1.06 0.40 - 4.00 mU/L Final   11/26/2016 0.87 0.40 - 4.00 mU/L Final     T4 Free   Date Value Ref Range Status   01/31/2007 1.26 0.70 - 1.85 ng/dL Final     Rheumatoid Factor   Date Value Ref Range Status   02/06/2024 <10 <14 IU/mL Final   09/30/2020 <7 <12 IU/mL Final   05/06/2016 <20 <20 IU/mL Final     Component      Latest Ref Rng 3/4/2025  11:58 AM   WBC      4.0 - 11.0 10e3/uL 3.8 (L)    RBC Count      3.80 - 5.20 10e6/uL 4.60    Hemoglobin      11.7 - 15.7 g/dL 14.0    Hematocrit      35.0 - 47.0 % 41.9    MCV      78 - 100 fL 91    MCH      26.5 - 33.0 pg 30.4    MCHC      31.5 - 36.5 g/dL 33.4    RDW      10.0 - 15.0 % 12.1    Platelet Count      150 - 450 10e3/uL 193    % Neutrophils      % 64    % Lymphocytes      % 28    % Monocytes      % 6    % Eosinophils      % 2    % Basophils      % 0    % Immature Granulocytes      % 0    Absolute Neutrophils      1.6 - 8.3 10e3/uL 2.4    Absolute Lymphocytes      0.8 - 5.3 10e3/uL 1.1    Absolute Monocytes      0.0 - 1.3 10e3/uL 0.2    Absolute Eosinophils      0.0 - 0.7 10e3/uL 0.1    Absolute Basophils      0.0 - 0.2 10e3/uL 0.0    Absolute Immature Granulocytes      <=0.4 10e3/uL 0.0    Creatinine      0.51 - 0.95 mg/dL 0.68    GFR Estimate      >60 mL/min/1.73m2 >90    Sed Rate      0 - 20 mm/hr 5    CRP Inflammation      <5.00 mg/L <3.00    ALT      0 - 50 U/L 15    Albumin      3.5 - 5.2 g/dL 4.7    AST      0 - 45 U/L 19       Legend:  (L) Low  Reviewed Rheumatology lab flowsheet         Again, thank you for allowing me to participate in the care of your patient.      Sincerely,    Dominga Sosa MD

## 2025-07-03 DIAGNOSIS — H53.71 GLARE SENSITIVITY: ICD-10-CM

## 2025-07-08 NOTE — TELEPHONE ENCOUNTER
"brimonidine (ALPHAGAN) 0.2 % ophthalmic solution   Start  4/2/25  Disp 5 ml  R  1  Place 1 drop into both eyes daily as needed (For reduced glare with nighttime driving).     Vickie Medeiros, OD  Optometry  Lv  4/2/25  Nv   3/17/26    \" She would like to try brimonidine for pupil reducing to help with glare but may further discuss CE/IOL and pro's/con's with Dr. Bosch in the future \"     Medication unable to be refilled by RN due to: Other:        Request from pharmacy:  Requested Prescriptions   Pending Prescriptions Disp Refills    brimonidine (ALPHAGAN) 0.2 % ophthalmic solution [Pharmacy Med Name: Brimonidine Tartrate Ophthalmic Solution 0.2 %] 5 mL 0     Sig: INSTILL ONE DROP INTO EACH EYE ONCE DAILY AS NEEDED FOR REDUCED GLARE WITH NIGHT TIME DRIVING.       There is no refill protocol information for this order         Medication unable to be refilled by RN due to: Other: 4/2/25  \"She would like to try brimonidine for pupil reducing to help with glare but may further discuss CE/IOL and pro's/con's with Dr. Bosch in the future /  rf?          Request from pharmacy:  Requested Prescriptions   Pending Prescriptions Disp Refills    brimonidine (ALPHAGAN) 0.2 % ophthalmic solution [Pharmacy Med Name: Brimonidine Tartrate Ophthalmic Solution 0.2 %] 5 mL 0     Sig: INSTILL ONE DROP INTO EACH EYE ONCE DAILY AS NEEDED FOR REDUCED GLARE WITH NIGHT TIME DRIVING.       There is no refill protocol information for this order                    "

## 2025-07-09 ENCOUNTER — INFUSION THERAPY VISIT (OUTPATIENT)
Dept: INFUSION THERAPY | Facility: HOSPITAL | Age: 30
End: 2025-07-09
Attending: INTERNAL MEDICINE
Payer: COMMERCIAL

## 2025-07-09 VITALS
WEIGHT: 146 LBS | OXYGEN SATURATION: 100 % | SYSTOLIC BLOOD PRESSURE: 114 MMHG | HEART RATE: 98 BPM | BODY MASS INDEX: 25.86 KG/M2 | RESPIRATION RATE: 16 BRPM | TEMPERATURE: 98.4 F | DIASTOLIC BLOOD PRESSURE: 77 MMHG

## 2025-07-09 DIAGNOSIS — M06.09 RHEUMATOID ARTHRITIS OF MULTIPLE SITES WITHOUT RHEUMATOID FACTOR (H): Primary | ICD-10-CM

## 2025-07-09 PROCEDURE — 96413 CHEMO IV INFUSION 1 HR: CPT

## 2025-07-09 PROCEDURE — 96375 TX/PRO/DX INJ NEW DRUG ADDON: CPT

## 2025-07-09 PROCEDURE — 258N000003 HC RX IP 258 OP 636

## 2025-07-09 PROCEDURE — 250N000011 HC RX IP 250 OP 636: Mod: JZ

## 2025-07-09 PROCEDURE — 96415 CHEMO IV INFUSION ADDL HR: CPT

## 2025-07-09 PROCEDURE — 999N000248 HC STATISTIC IV INSERT WITH US BY RN

## 2025-07-09 PROCEDURE — 250N000013 HC RX MED GY IP 250 OP 250 PS 637

## 2025-07-09 PROCEDURE — 96367 TX/PROPH/DG ADDL SEQ IV INF: CPT

## 2025-07-09 RX ORDER — ALBUTEROL SULFATE 90 UG/1
1-2 INHALANT RESPIRATORY (INHALATION)
Status: DISCONTINUED | OUTPATIENT
Start: 2025-07-09 | End: 2025-07-09 | Stop reason: HOSPADM

## 2025-07-09 RX ORDER — EPINEPHRINE 1 MG/ML
0.3 INJECTION, SOLUTION INTRAMUSCULAR; SUBCUTANEOUS EVERY 5 MIN PRN
Status: DISCONTINUED | OUTPATIENT
Start: 2025-07-09 | End: 2025-07-09 | Stop reason: HOSPADM

## 2025-07-09 RX ORDER — DIPHENHYDRAMINE HYDROCHLORIDE 50 MG/ML
25 INJECTION, SOLUTION INTRAMUSCULAR; INTRAVENOUS
Start: 2025-07-22

## 2025-07-09 RX ORDER — ACETAMINOPHEN 325 MG/1
650 TABLET ORAL ONCE
Start: 2025-07-22 | End: 2025-07-22

## 2025-07-09 RX ORDER — EPINEPHRINE 1 MG/ML
0.3 INJECTION, SOLUTION INTRAMUSCULAR; SUBCUTANEOUS EVERY 5 MIN PRN
OUTPATIENT
Start: 2025-07-22

## 2025-07-09 RX ORDER — METHYLPREDNISOLONE SODIUM SUCCINATE 125 MG/2ML
125 INJECTION INTRAMUSCULAR; INTRAVENOUS ONCE
Status: COMPLETED | OUTPATIENT
Start: 2025-07-09 | End: 2025-07-09

## 2025-07-09 RX ORDER — METHYLPREDNISOLONE SODIUM SUCCINATE 40 MG/ML
40 INJECTION INTRAMUSCULAR; INTRAVENOUS
Status: DISCONTINUED | OUTPATIENT
Start: 2025-07-09 | End: 2025-07-09 | Stop reason: HOSPADM

## 2025-07-09 RX ORDER — METHYLPREDNISOLONE SODIUM SUCCINATE 40 MG/ML
40 INJECTION INTRAMUSCULAR; INTRAVENOUS
Start: 2025-07-22

## 2025-07-09 RX ORDER — METHYLPREDNISOLONE SODIUM SUCCINATE 125 MG/2ML
125 INJECTION INTRAMUSCULAR; INTRAVENOUS ONCE
OUTPATIENT
Start: 2025-07-22 | End: 2025-07-22

## 2025-07-09 RX ORDER — ACETAMINOPHEN 325 MG/1
650 TABLET ORAL ONCE
Status: COMPLETED | OUTPATIENT
Start: 2025-07-09 | End: 2025-07-09

## 2025-07-09 RX ORDER — ALBUTEROL SULFATE 0.83 MG/ML
2.5 SOLUTION RESPIRATORY (INHALATION)
Status: DISCONTINUED | OUTPATIENT
Start: 2025-07-09 | End: 2025-07-09 | Stop reason: HOSPADM

## 2025-07-09 RX ORDER — HEPARIN SODIUM (PORCINE) LOCK FLUSH IV SOLN 100 UNIT/ML 100 UNIT/ML
5 SOLUTION INTRAVENOUS
Status: DISCONTINUED | OUTPATIENT
Start: 2025-07-09 | End: 2025-07-09 | Stop reason: HOSPADM

## 2025-07-09 RX ORDER — DIPHENHYDRAMINE HCL 25 MG
25 CAPSULE ORAL ONCE
Start: 2025-07-22 | End: 2025-07-22

## 2025-07-09 RX ORDER — ALBUTEROL SULFATE 0.83 MG/ML
2.5 SOLUTION RESPIRATORY (INHALATION)
OUTPATIENT
Start: 2025-07-22

## 2025-07-09 RX ORDER — HEPARIN SODIUM (PORCINE) LOCK FLUSH IV SOLN 100 UNIT/ML 100 UNIT/ML
5 SOLUTION INTRAVENOUS
OUTPATIENT
Start: 2025-07-22

## 2025-07-09 RX ORDER — HEPARIN SODIUM,PORCINE 10 UNIT/ML
5-20 VIAL (ML) INTRAVENOUS DAILY PRN
Status: DISCONTINUED | OUTPATIENT
Start: 2025-07-09 | End: 2025-07-09 | Stop reason: HOSPADM

## 2025-07-09 RX ORDER — DIPHENHYDRAMINE HYDROCHLORIDE 50 MG/ML
50 INJECTION, SOLUTION INTRAMUSCULAR; INTRAVENOUS
Status: DISCONTINUED | OUTPATIENT
Start: 2025-07-09 | End: 2025-07-09 | Stop reason: HOSPADM

## 2025-07-09 RX ORDER — HEPARIN SODIUM,PORCINE 10 UNIT/ML
5-20 VIAL (ML) INTRAVENOUS DAILY PRN
OUTPATIENT
Start: 2025-07-22

## 2025-07-09 RX ORDER — DIPHENHYDRAMINE HYDROCHLORIDE 50 MG/ML
50 INJECTION, SOLUTION INTRAMUSCULAR; INTRAVENOUS
Start: 2025-07-22

## 2025-07-09 RX ORDER — ALBUTEROL SULFATE 90 UG/1
1-2 INHALANT RESPIRATORY (INHALATION)
Start: 2025-07-22

## 2025-07-09 RX ORDER — DIPHENHYDRAMINE HYDROCHLORIDE 50 MG/ML
25 INJECTION, SOLUTION INTRAMUSCULAR; INTRAVENOUS
Status: DISCONTINUED | OUTPATIENT
Start: 2025-07-09 | End: 2025-07-09 | Stop reason: HOSPADM

## 2025-07-09 RX ADMIN — INFLIXIMAB 200 MG: 100 INJECTION, POWDER, LYOPHILIZED, FOR SOLUTION INTRAVENOUS at 10:16

## 2025-07-09 RX ADMIN — SODIUM CHLORIDE 250 ML: 0.9 INJECTION, SOLUTION INTRAVENOUS at 09:55

## 2025-07-09 RX ADMIN — DIPHENHYDRAMINE HYDROCHLORIDE 25 MG: 50 INJECTION INTRAMUSCULAR; INTRAVENOUS at 09:55

## 2025-07-09 RX ADMIN — ACETAMINOPHEN 650 MG: 325 TABLET ORAL at 09:51

## 2025-07-09 RX ADMIN — METHYLPREDNISOLONE SODIUM SUCCINATE 125 MG: 125 INJECTION, POWDER, FOR SOLUTION INTRAMUSCULAR; INTRAVENOUS at 09:52

## 2025-07-09 RX ADMIN — FAMOTIDINE 20 MG: 10 INJECTION INTRAVENOUS at 10:47

## 2025-07-09 NOTE — PROGRESS NOTES
Infusion Nursing Note:  Samara Oropeza presents today for infliximab.    Patient seen by provider today: No   present during visit today: Not Applicable.    Note: /77 (Patient Position: Sitting)   Pulse 98   Temp 98.4  F (36.9  C) (Oral)   Resp 16   Wt 66.2 kg (146 lb)   SpO2 100%   BMI 25.86 kg/m      Pt has a hx of a previous reaction. Today for premeds she got IV benadryl, solumedrol and PO tylenol as premeds. Approx 15 mins into the infliximab she began c/o itching across her face and chest. The infusion was stopped and 20mg of pepcid was given. Infusion was restarted about 15mins later and the itching continued but did not get worse. After about an hour she said it was still present but was getting slightly better. She did not complain of any respiratory symptoms or have any other complaints other than the face and chest itching. Dr. Sosa was contacted and the plan going forward will be to continue with the premeds as stated above as well as adding pepcid. Pt is agreeable to this plan.    Premeds Given: benadryl IV, solumedrol, and tylenol    Intravenous Access:  Peripheral IV placed by PICC team.    Treatment Conditions:  Biological Infusion Checklist:  ~~~ NOTE: If the patient answers yes to any of the questions below, hold the infusion and contact ordering provider or on-call provider.    Have you recently had an elevated temperature, fever, chills, productive cough, coughing for 3 weeks or longer or hemoptysis,  abnormal vital signs, night sweats,  chest pain or have you noticed a decrease in your appetite, unexplained weight loss or fatigue? No  Do you have any open wounds or new incisions? No  Do you have any upcoming hospitalizations or surgeries? Does not include esophagogastroduodenoscopy, colonoscopy, endoscopic retrograde cholangiopancreatography (ERCP), endoscopic ultrasound (EUS), dental procedures or joint aspiration/steroid injections No  Do you currently have any  signs of illness or infection or are you on any antibiotics? No  Have you had any new, sudden or worsening abdominal pain? No  Have you or anyone in your household received a live vaccination in the past 4 weeks? Please note: No live vaccines while on biologic/chemotherapy until 6 months after the last treatment. Patient can receive the flu vaccine (shot only), pneumovax and the Covid vaccine. It is optimal for the patient to get these vaccines mid cycle, but they can be given at any time as long as it is not on the day of the infusion. No  Have you recently been diagnosed with any new nervous system diseases (ie. Multiple sclerosis, Guillain [place], seizures, neurological changes) or cancer diagnosis? Are you on any form of radiation or chemotherapy? No  Are you pregnant or breast feeding or do you have plans of pregnancy in the future? No  Have you been having any signs of worsening depression or suicidal ideations?  (benlysta only) No  Have there been any other new onset medical symptoms? No  Have you had any new blood clots? (IVIG only) No      Post Infusion Assessment:  Patient tolerated infusion without incident.  Site patent and intact, free from redness, edema or discomfort.  No evidence of extravasations.  Access discontinued per protocol.       Discharge Plan:   Patient discharged in stable condition accompanied by: self.  Departure Mode: Ambulatory.      Mikaela Cortes RN

## 2025-07-11 ENCOUNTER — VIRTUAL VISIT (OUTPATIENT)
Dept: PHARMACY | Facility: CLINIC | Age: 30
End: 2025-07-11
Attending: INTERNAL MEDICINE
Payer: COMMERCIAL

## 2025-07-11 DIAGNOSIS — K13.79 RECURRENT ORAL ULCERS: ICD-10-CM

## 2025-07-11 DIAGNOSIS — M06.09 RHEUMATOID ARTHRITIS OF MULTIPLE SITES WITHOUT RHEUMATOID FACTOR (H): ICD-10-CM

## 2025-07-11 DIAGNOSIS — M35.2 BEHCET'S DISEASE (H): Primary | ICD-10-CM

## 2025-07-12 ENCOUNTER — HEALTH MAINTENANCE LETTER (OUTPATIENT)
Age: 30
End: 2025-07-12

## 2025-07-14 RX ORDER — BRIMONIDINE TARTRATE 2 MG/ML
SOLUTION/ DROPS OPHTHALMIC
Qty: 5 ML | Refills: 0 | Status: SHIPPED | OUTPATIENT
Start: 2025-07-14

## 2025-07-14 NOTE — PROGRESS NOTES
"Medication Therapy Management (MTM) Encounter    ASSESSMENT:                            Medication Adherence/Access: {adherencechoices:055385}    ***:  ***      PLAN:                            ***    Follow-up: {followuptest2:947107}    SUBJECTIVE/OBJECTIVE:                          Samara Oropeza is a 29 year old female seen for {mtmvisit:430956}     Reason for visit: ***.    Allergies/ADRs: {1/2/3/4/5:867921}  Past Medical History: {1/2/3/4/5:872318}  Tobacco: She reports that she has never smoked. She has never been exposed to tobacco smoke. She has never used smokeless tobacco.  Alcohol: {ALCOHOL CONSUMPTION HX:489558}  {Social and Goals:866905}    Medication Adherence/Access: {fumedadherence:187821}    ***:   ***    {MTM SUBJECTIVE (Optional):731936}      Today's Vitals: There were no vitals taken for this visit.  ----------------  {DANIELLA?:651408}    I spent {mtm total time 3:767848} with this patient today. { :670654}.     A summary of these recommendations {GIVEN/NOT GIVEN:757064}.    ***    Telemedicine Visit Details  The patient's medications can be safely assessed via a telemedicine encounter.  Type of service:  {telemedvisitmtm:581790::\"Telephone visit\"}  Originating Location (pt. Location): {video visit patient location:563034::\"Home\"}  {PROVIDER LOCATION On-site should be selected for visits conducted from your clinic location or adjoining [facility] hospital, academic office, or other nearby [facility] building. Off-site should be selected for all other provider locations, including home:838817}  Distant Location (provider location):  {virtual location provider:663290}  Start Time: {video/phone visit start time:152948}  End Time: {video/phone visit end time:152948}     Medication Therapy Recommendations  No medication therapy recommendations to display     " just finish her 2nd infliximab infusion and has been on methotrexate ~1 month. Had chest burning and coughing reaction during first infliximab infusion and notes itching with both infusions. Was given Pepcid during 2nd infusion and found it very helpful - wondering if she can have this with every infusion. Has also been having nausea and ringing in her ears after infusions. Does notice these symptoms but not particularly bothersome. No methotrexate side effects noted.    Last lab monitoring completed: 6/24/25    Today's Vitals: There were no vitals taken for this visit.  ----------------    I spent 19 minutes with this patient today. All changes were made via collaborative practice agreement with Dominga Sosa MD.     A summary of these recommendations was sent via [employer].    Joya HaynesD  Medication Therapy Management Pharmacist  [facility]  Phone: [phone]    Telemedicine Visit Details  The patient's medications can be safely assessed via a telemedicine encounter.  Type of service:  Telephone visit  Originating Location (pt. Location): Home    Distant Location (provider location):  On-site  Start Time: 9:00 AM  End Time: 9:19 AM     Medication Therapy Recommendations  No medication therapy recommendations to display

## 2025-07-22 ENCOUNTER — OFFICE VISIT (OUTPATIENT)
Dept: PHYSICAL MEDICINE AND REHAB | Facility: CLINIC | Age: 30
End: 2025-07-22
Payer: COMMERCIAL

## 2025-07-22 VITALS — DIASTOLIC BLOOD PRESSURE: 76 MMHG | HEART RATE: 114 BPM | SYSTOLIC BLOOD PRESSURE: 113 MMHG

## 2025-07-22 DIAGNOSIS — M54.81 BILATERAL OCCIPITAL NEURALGIA: ICD-10-CM

## 2025-07-22 DIAGNOSIS — M79.18 MYOFASCIAL PAIN: ICD-10-CM

## 2025-07-22 DIAGNOSIS — G43.719 CHRONIC MIGRAINE WITHOUT AURA, INTRACTABLE, WITHOUT STATUS MIGRAINOSUS: Primary | ICD-10-CM

## 2025-07-22 PROCEDURE — 3074F SYST BP LT 130 MM HG: CPT | Performed by: PHYSICAL MEDICINE & REHABILITATION

## 2025-07-22 PROCEDURE — 95874 GUIDE NERV DESTR NEEDLE EMG: CPT | Mod: GC | Performed by: PHYSICAL MEDICINE & REHABILITATION

## 2025-07-22 PROCEDURE — 64615 CHEMODENERV MUSC MIGRAINE: CPT | Mod: GC | Performed by: PHYSICAL MEDICINE & REHABILITATION

## 2025-07-22 PROCEDURE — 64405 NJX AA&/STRD GR OCPL NRV: CPT | Mod: XS | Performed by: PHYSICAL MEDICINE & REHABILITATION

## 2025-07-22 PROCEDURE — 3078F DIAST BP <80 MM HG: CPT | Performed by: PHYSICAL MEDICINE & REHABILITATION

## 2025-07-22 PROCEDURE — 20552 NJX 1/MLT TRIGGER POINT 1/2: CPT | Mod: XS | Performed by: PHYSICAL MEDICINE & REHABILITATION

## 2025-07-22 NOTE — PATIENT INSTRUCTIONS
"Recommendations from today's MTM visit:                                                       1. I will add Pepcid (famotidine) 20 mg as a premedication on your infliximab infusion orders. This can be given prior to each of your infusions to minimize reactions.    2. Continue all medications as prescribed.    Follow-up: Return in 3 months (on 10/15/2025) for MTM Pharmacist Visit.    It was great speaking with you today.  I value your experience and would be very thankful for your time in providing feedback in our clinic survey. In the next few days, you may receive an email or text message from [employer] with a link to a survey related to your  clinical pharmacist.\"     To schedule another MTM appointment, please call the clinic directly or you may call the MTM scheduling line at [phone].    My Clinical Pharmacist's contact information:                                                      Please feel free to contact me with any questions or concerns you have.      Natalie Leach, PharmD  Medication Therapy Management Pharmacist  [facility]  Phone: [phone]     "

## 2025-07-22 NOTE — LETTER
7/22/2025      Samara Oropeza  [address] [facility] [address]  [place] [facility] [place] [address]      Dear Colleague,    Thank you for referring your patient, Samara Oropeza, to the [facility]. Please see a copy of my visit note below.      [facility]    PM&R CLINIC NOTE  BOTULINUM TOXIN PROCEDURE      CC: Chronic Migraine Headaches    HPI  Samara Oropeza is a 29 year old female who presents to clinic for botulinum toxin injections for management of chronic migraine headaches.     SINCE LAST VISIT  Samara Oropeza was last seen here in clinic on 4/29/2025, at which time she received 200 units of Botox as well as a nerve block in the greater and lesser occipital nerves.     Patient denies new medical diagnoses, illnesses, hospitalizations, emergency room visits, and injuries since the previous injection with botulinum neurotoxin.     RESPONSE TO PREVIOUS TREATMENT    Side effects: None    1.  Headache frequency during this injection cycle: She reports a couple migraines during the week after her last injection but none after that until wearing off a month ago. Within the last month, she has had 3 bad migraines.     2.  Headache duration during this injection cycle:   Headache duration was usually a few hours to a the whole day. Patient reports a few episodes of multiple day headaches during this injection cycle, particularly as the Botox was wearing off.     3.  Headache intensity during this injection cycle:    A.  7/10  =  Typical pain level.  B.  9/10  =  Worst pain level  C.  3/10  =  Lowest pain level.    4.  Change in headache medication usage during this injection cycle:  None (For Example:  Able to decrease use of oral pain medications.) She has been taking Tylenol and ibuprofen as needed for her migraine headaches. She will take rizatriptan if the OTC medications do not work. For prevention, she uses Amitripyline     5.  ER Visits  During This Injection Cycle:  None.     6.  Functional Performance:  Change in ADL's, social interaction, days lost from work, etc. Patient reports being able to more fully participate in social and family activities and responsibilities as headache symptoms have improved.      PHYSICAL EXAM  VS: There were no vitals taken for this visit.   GEN: Pleasant and cooperative, in no acute distress  HEENT: No facial asymmetry    ALLERGIES  Allergies   Allergen Reactions     Amoxil [Penicillins] Rash     Dad unsure of reaction.     Bee Venom Anaphylaxis     Bioflavonoids Anaphylaxis     Citrus Anaphylaxis     All [place]     Contrast Dye Rash     Contrast Media [place] Contrast Media [facility]09[place].  NOTE: this is a contrast media oral with iodine. Premedicate with methylpred standard for IV contrast, request barium contrast for oral contrast.     Iodinated Contrast Media Hives and Rash     Contrast Media [facility] 04[place]09[place].; Contrast Media [facility] 04[place]09[place].  NOTE: this is a contrast media oral with iodine. Premedicate with methylpred standard for IV contrast, request barium contrast for oral contrast.     Pineapple Anaphylaxis, Difficulty breathing and Rash     Reglan [Metoclopramide] Other (See Comments)     IV dose only, in ER, rapid heart rate.     Ace Inhibitors      Difficulty in breathing and GI upset     Amitiza [Lubiprostone] Nausea and Vomiting     Amoxicillin-Pot Clavulanate      Midazolam Unknown     parent states that when pt takes this medication, she wakes up being very violent .     Midazolam Hcl      Coming out of pelvic exam at age of 5, was kicking and screaming when coming out of the versed.     Other [No Clinical Screening - See Comments]      Bleech/ chest tightness, itchy throat, swollen tongue, hives     Tizanidine Other (See Comments)     Confusion, back pain, photophobia, abdominal pain, shaking, anxious       Adhesive Tape Rash     Azithromycin  Hives and Rash     Cephalexin Itching and Rash     Sulfa Antibiotics Rash     Skin scarring       CURRENT MEDICATIONS    Current Outpatient Medications:      albuterol (PROAIR HFA/PROVENTIL HFA/VENTOLIN HFA) 108 (90 Base) MCG/ACT inhaler, Inhale 2 puffs into the lungs every 6 hours as needed for shortness of breath / dyspnea or wheezing, Disp: 1 Inhaler, Rfl: 1     amitriptyline (ELAVIL) 25 MG tablet, TAKE ONE TABLET BY MOUTH AT BEDTIME, Disp: 90 tablet, Rfl: 1     amitriptyline (ELAVIL) 25 MG tablet, Take 1 tablet (25 mg) by mouth at bedtime., Disp: 90 tablet, Rfl: 1     apremilast (OTEZLA) 30 MG tablet, Take 1 tablet (30 mg) by mouth 2 times daily., Disp: 60 tablet, Rfl: 11     artificial tears OINT ophthalmic ointment, 0.5 inch strip each eye at night, Disp: 1 Tube, Rfl: 11     augmented betamethasone dipropionate (DIPROLENE-AF) 0.05 % external ointment, Apply topically 2 times daily., Disp: 50 g, Rfl: 3     azelaic acid (FINACIA) 15 % external gel, Apply topically at bedtime., Disp: 50 g, Rfl: 11     azelaic acid (FINACIA) 15 % external gel, Once daily to scars, upper chest and small portion of back and spot on belly button. Hold if irritating, Disp: 50 g, Rfl: 5     benzocaine (AMERICAINE) 20 % external aerosol, Apply to perineum four times daily as needed for pain, Disp: 57 g, Rfl: 3     benzocaine (TOPICALE XTRA) 20 % GEL, Apply 1 g to affected area 4 times daily as needed for mouth sores., Disp: 30 g, Rfl: 5     betamethasone valerate (VALISONE) 0.1 % cream, Apply topically 2 times daily as needed , Disp: , Rfl:      bisacodyl (DULCOLAX) 5 MG EC tablet, Take 2 tablets (10 mg) by mouth daily as needed for constipation, Disp: 30 tablet, Rfl: 0     brimonidine (ALPHAGAN) 0.2 % ophthalmic solution, INSTILL ONE DROP INTO EACH EYE ONCE DAILY AS NEEDED FOR REDUCED GLARE WITH NIGHT TIME DRIVING., Disp: 5 mL, Rfl: 0     citalopram (CELEXA) 20 MG tablet, Take 1 tablet (20 mg) by mouth daily, Disp: 90 tablet, Rfl: 1      EPINEPHrine (EPIPEN 2-EAMON) 0.3 MG/0.3ML injection, Inject 0.3 mLs (0.3 mg) into the muscle as needed for anaphylaxis, Disp: 0.6 mL, Rfl: 3     etonogestrel (NEXPLANON) 68 MG IMPL, Inject 68 mg Subcutaneous, Disp: , Rfl:      fluocinonide (LIDEX) 0.05 % external gel, Apply topically 2 times daily. Intra-oral, Disp: 60 g, Rfl: 11     fluocinonide (LIDEX) 0.05 % external gel, Apply topically 2 times daily., Disp: 60 g, Rfl: 11     folic acid (FOLVITE) 1 MG tablet, Take 1 tablet (1 mg) by mouth daily., Disp: 90 tablet, Rfl: 3     gabapentin (NEURONTIN) 300 MG capsule, Take 1 capsule (300 mg) by mouth every morning, Disp: 60 capsule, Rfl: 1     hydroquinone (ARACELY) 4 % external cream, Apply once daily for 3 months. Stop for one month. If further lightening is desired, proceed with one additional month of treatment., Disp: 28 g, Rfl: 1     hydrOXYzine HCl (ATARAX) 25 MG tablet, TAKE ONE OR TWO TABLETS BY MOUTH EVERY SIX HOURS AS NEEDED (Patient not taking: Reported on 6/24/2025), Disp: , Rfl:      lactulose 20 GM/30ML SOLN, Take 30 mLs by mouth 3 times daily as needed (for constipation), Disp: 300 mL, Rfl: 3     lanolin ointment, Apply topically every hour as needed for other (sore nipples), Disp: 7 g, Rfl: 3     lidocaine (LMX4) 4 % external cream, Apply topically once as needed for mild pain, Disp: 120 g, Rfl: 1     lifitegrast (XIIDRA) 5 % opthalmic solution, Place 1 drop into both eyes 2 times daily., Disp: 180 each, Rfl: 3     LINZESS 290 MCG capsule, TAKE 1 CAPSULE BY MOUTH EVERY DAY IN THE MORNING BEFORE BREAKFAST, Disp: 90 capsule, Rfl: 0     LORazepam (ATIVAN) 1 MG tablet, Take 1 tablet by mouth every 8 hours as needed., Disp: , Rfl:      methotrexate sodium 50 MG/2ML SOLN, Inject 0.4 mLs (10 mg) as directed once a week., Disp: 8 mL, Rfl: 5     mometasone (ELOCON) 0.1 % external ointment, Apply topically 2 times daily., Disp: 45 g, Rfl: 11     ondansetron (ZOFRAN ODT) 8 MG ODT tab, Take 1 tablet (8 mg) by  "mouth every 8 hours as needed, Disp: 90 tablet, Rfl: 3     polyethylene glycol (MIRALAX/GLYCOLAX) powder, Take 1 capful by mouth 3 times daily, Disp: , Rfl:      predniSONE (DELTASONE) 5 MG tablet, 15-10-5 mg a day, each course x  5 days then stop as needed for flare ups (Patient not taking: Reported on 2025), Disp: 30 tablet, Rfl: 2     rizatriptan (MAXALT-MLT) 5 MG ODT, DISSOLVE 1 OR 2 TABLETS IN THE MOUTH AS NEEDED FOR HEADACHE AT ONSET OF MIGRAINE, MAY REPEAT IN 2 HOURS AS NEEDED. MAX 30MG IN 24 HOURS AND MAX 5 DAYS PER MONTH, Disp: , Rfl:      Sharps Container MISC, Use for methotreaxte shots (Patient not taking: Reported on 2025), Disp: 1 each, Rfl: 11     sodium fluoride 1.1 % CREA, Apply 1 Application topically daily (Patient not taking: Reported on 2025), Disp: , Rfl:      sucralfate (CARAFATE) 1 GM/10ML suspension, Take 10 mLs (1 g) by mouth 4 times daily (Patient not taking: Reported on 2025), Disp: 1200 mL, Rfl: 2     syringe/needle, sisp, 25G X 5/8\" 1 ML MISC, Inject 1 Syringe subcutaneously every 7 days. Use with Methotrexate, Disp: 12 each, Rfl: 3     triamcinolone (KENALOG) 0.1 % external ointment, Apply twice daily as needed to lesions on the genitals and body. OK to use very sparingly on the face., Disp: 454 g, Rfl: 2     White Petrolatum-Mineral Oil (ARTIFICIAL TEARS) 83-15 % OINT, Apply 1 g to eye at bedtime. Apply to each eye at night at bedtime., Disp: 3.5 g, Rfl: 11    Current Facility-Administered Medications:      botulinum toxin type A (BOTOX) 100 units injection 200 Units, 200 Units, Intramuscular, Q90 Days, Standal, Alejandrina Vera MD, 200 Units at 25 1201       BOTULINUM NEUROTOXIN INJECTION PROCEDURES    VERIFICATION OF PATIENT IDENTIFICATION AND PROCEDURE     Initials   Patient Name SES   Patient  SES   Procedure Verified by: SES     Prior to the start of the procedure and with procedural staff participation, I verbally confirmed the patient s identity " using two indicators, relevant allergies, that the procedure was appropriate and matched the consent or emergent situation, and that the correct equipment/implants were available. Immediately prior to starting the procedure I conducted the Time Out with the procedural staff and re-confirmed the patient s name, procedure, and site/side. (The Joint Commission universal protocol was followed.)  Yes    Sedation (Moderate or Deep): None    ABOVE ASSESSMENTS PERFORMED BY    Alejandrina Hernandez MD    INDICATIONS FOR PROCEDURES  Samara Oropeza is a 29 year old patient with pain secondary to the diagnosis of chronic migraine headaches. Her baseline symptoms have been recalcitrant to oral medications and conservative therapy.  She is here today for reinjection with Botox.    GOAL OF PROCEDURE  The goal of this procedure is to decrease pain.      TOTAL DOSE ADMINISTERED  Dose Administered:  200 units  Botox (Botulinum Toxin Type A)       2:1 Dilution   Unavoidable Drug Waste: No.  Diluent Used:  Preservative Free Normal Saline  Total Volume of Diluent Used:  4 ml  NDC #: Botox 100u ([address]-99)      CONSENT  The risks, benefits, and treatment options were discussed with Samara Oropeza and she agreed to proceed.    Written consent was obtained by  [facility] .     EQUIPMENT USED  Needle-25mm stimulating/recording  Needle-30 gauge  EMG/NCS Machine    SKIN PREPARATION  Skin preparation was performed using an alcohol wipe.    GUIDANCE DESCRIPTION  Electro-myographic guidance was necessary throughout the neck portion of the procedure to accurately identify all areas of spastic muscles while avoiding injection of non-spastic muscles, neighboring nerves and nearby vascular structures.       AREA/MUSCLE INJECTED:  200 UNITS BOTOX = TOTAL DOSE, 2:1 DILUTION      1. SHOULDER GIRDLE & NECK MUSCLES: 30 UNITS BOTOX = TOTAL DOSE    Right Levator Scapulae - 5 units of Botox over 2 site/s.   Left Levator Scapulae - 5 units of Botox over  1 site/s.    Right Sternocleidomastoid - 2.5 units of Botox over 1 site/s.   Left Sternocleidomastoid - 2.5 units of Botox over 1 site/s.     Right Anterior Scalene - 5 units of Botox over 1 site/s    Right Rhomboid Major - 2.5 units of Botox over 1 site/s.    Left Rhomboid Major - 2.5 units of Botox over 1 site/s.      Right Pectoralis Minor - 2.5 units of Botox over 1 site/s.    Left Pectoralis Minor - 2.5 units of Botox over 1 site/s.     2. FACIAL & SCALP MUSCLES: 170 UNITS BOTOX = TOTAL DOSE     Right Occipitalis - 20 units of Botox over 2 site/s.   Left Occipitalis - 20 units of Botox over 2 site/s.     Right Frontalis - 10 units of Botox over 2 site/s.  Left Frontalis - 10 units of Botox over 2 site/s.     Right Temporalis - 50 units of Botox over 8 site/s.  Left Temporalis - 50 units of Botox over 8 site/s.     Right  - 2.5 units of Botox over 1 site/s.              Left  - 2.5 units of Botox over 1 site/s.                 Procerus - 5 units of Botox at 1 site/s.      BILATERAL GREATER & LESSER OCCIPITAL NERVE BLOCKS, TRIGGER POINT INJECTIONS  1% lidocaine: 2 ml  0.5% bupivacaine: 10 ml  Kenalo ml = 40 mg    ONB: Area just inferior to insertion of the right and left superior trapezius insertion onto skull was cleansed with ChloraPrep. Needle was advanced anteriorly to base of skull then slightly withdrawn and injectate was injected in a fan-like distribution at different depths. An 8 ml mixture of 1% lidocaine, 0.5% bupivacaine and Kenalog was divided into two 5 ml syringes. Total injection volume = 4 ml per side.     TPI: Areas of the skin were prepped with ChloraPrep.  Using standard precautions, a 30-gauge 1-inch needle was used to inject a 3 ml mixture of 1% lidocaine and 0.5% bupivacaine into the upper trapezius and rhomboid major/minor muscles bilaterally for a total of 8 sites.         RESPONSE TO PROCEDURE  Samara YNES Sadnip tolerated the procedure well and there were no  immediate complications. She was allowed to recover for an appropriate period of time and was discharged home in stable condition.      ASSESSMENT AND PLAN   Botulinum toxin injections: No changes to the botox injections today. Occipital nerve blocks and trigger point injections also administered per patient request to address occipital neuralgia and myofascial pain. Patient will continue to monitor response to Botox and report at next appointment.   Referrals: None.   Medications: No changes.   Follow up: Samara Oropeza was rescheduled for the next series of injections in 12 weeks, at which time we will evaluate response to today's injections. she may call the clinic prior with any questions or concerns prior to the next appointment.       Eliana Rolon MD  Movement Disorders Fellow     Patient seen and discussed with Dr. Hernandez      I, Alejandrina Hernandez MD, saw this patient with movement disorder fellow, Dr. Rolon, and agree with the findings and plan of care as documented in this note. I personally reviewed the chart (vitals signs, medications, labs and imaging). My key findings and key management decisions made are reflected in this note.  I was present for the entire procedure listed above.      Alejandrina Hernandez MD      Again, thank you for allowing me to participate in the care of your patient.        Sincerely,        Alejandrina Hernandez MD    Electronically signed

## 2025-07-22 NOTE — PROGRESS NOTES
[facility]    PM&R CLINIC NOTE  BOTULINUM TOXIN PROCEDURE      CC: Chronic Migraine Headaches    HPI  Samara Oropeza is a 29 year old female who presents to clinic for botulinum toxin injections for management of chronic migraine headaches.     SINCE LAST VISIT  Samara Oropeza was last seen here in clinic on 4/29/2025, at which time she received 200 units of Botox as well as a nerve block in the greater and lesser occipital nerves.     Patient denies new medical diagnoses, illnesses, hospitalizations, emergency room visits, and injuries since the previous injection with botulinum neurotoxin.     RESPONSE TO PREVIOUS TREATMENT    Side effects: None    1.  Headache frequency during this injection cycle: She reports a couple migraines during the week after her last injection but none after that until wearing off a month ago. Within the last month, she has had 3 bad migraines.     2.  Headache duration during this injection cycle:   Headache duration was usually a few hours to a the whole day. Patient reports a few episodes of multiple day headaches during this injection cycle, particularly as the Botox was wearing off.     3.  Headache intensity during this injection cycle:    A.  7/10  =  Typical pain level.  B.  9/10  =  Worst pain level  C.  3/10  =  Lowest pain level.    4.  Change in headache medication usage during this injection cycle:  None (For Example:  Able to decrease use of oral pain medications.) She has been taking Tylenol and ibuprofen as needed for her migraine headaches. She will take rizatriptan if the OTC medications do not work. For prevention, she uses Amitripyline     5.  ER Visits During This Injection Cycle:  None.     6.  Functional Performance:  Change in ADL's, social interaction, days lost from work, etc. Patient reports being able to more fully participate in social and family activities and responsibilities as headache symptoms have  improved.      PHYSICAL EXAM  VS: There were no vitals taken for this visit.   GEN: Pleasant and cooperative, in no acute distress  HEENT: No facial asymmetry    ALLERGIES  Allergies   Allergen Reactions    Amoxil [Penicillins] Rash     Dad unsure of reaction.    Bee Venom Anaphylaxis    Bioflavonoids Anaphylaxis    Citrus Anaphylaxis     All Bullitt    Contrast Dye Rash     Contrast Media [facility] [place]09[place].; Contrast Media [facility] [place] 04[place]09/2014.  NOTE: this is a contrast media oral with iodine. Premedicate with methylpred standard for IV contrast, request barium contrast for oral contrast.    Iodinated Contrast Media Hives and Rash     Contrast Media [facility] [place] 04[place]09[place]2014.; Contrast [facility] [place] 04[place]09/2014.  NOTE: this is a contrast media oral with iodine. Premedicate with methylpred standard for IV contrast, request barium contrast for oral contrast.    Pineapple Anaphylaxis, Difficulty breathing and Rash    Reglan [Metoclopramide] Other (See Comments)     IV dose only, in ER, rapid heart rate.    Ace Inhibitors      Difficulty in breathing and GI upset    Amitiza [Lubiprostone] Nausea and Vomiting    Amoxicillin-Pot Clavulanate     Midazolam Unknown     parent states that when pt takes this medication, she wakes up being very violent .    Midazolam Hcl      Coming out of pelvic exam at age of 5, was kicking and screaming when coming out of the versed.    Other [No Clinical Screening - See Comments]      Bleech/ chest tightness, itchy throat, swollen tongue, hives    Tizanidine Other (See Comments)     Confusion, back pain, photophobia, abdominal pain, shaking, anxious      Adhesive Tape Rash    Azithromycin Hives and Rash    Cephalexin Itching and Rash    Sulfa Antibiotics Rash     Skin scarring       CURRENT MEDICATIONS    Current Outpatient Medications:     albuterol (PROAIR HFA/PROVENTIL HFA/VENTOLIN HFA) 108 (90 Base) MCG/ACT inhaler, Inhale 2 puffs into the lungs every 6 hours as  needed for shortness of breath / dyspnea or wheezing, Disp: 1 Inhaler, Rfl: 1    amitriptyline (ELAVIL) 25 MG tablet, TAKE ONE TABLET BY MOUTH AT BEDTIME, Disp: 90 tablet, Rfl: 1    amitriptyline (ELAVIL) 25 MG tablet, Take 1 tablet (25 mg) by mouth at bedtime., Disp: 90 tablet, Rfl: 1    apremilast (OTEZLA) 30 MG tablet, Take 1 tablet (30 mg) by mouth 2 times daily., Disp: 60 tablet, Rfl: 11    artificial tears OINT ophthalmic ointment, 0.5 inch strip each eye at night, Disp: 1 Tube, Rfl: 11    augmented betamethasone dipropionate (DIPROLENE-AF) 0.05 % external ointment, Apply topically 2 times daily., Disp: 50 g, Rfl: 3    azelaic acid (FINACIA) 15 % external gel, Apply topically at bedtime., Disp: 50 g, Rfl: 11    azelaic acid (FINACIA) 15 % external gel, Once daily to scars, upper chest and small portion of back and spot on belly button. Hold if irritating, Disp: 50 g, Rfl: 5    benzocaine (AMERICAINE) 20 % external aerosol, Apply to perineum four times daily as needed for pain, Disp: 57 g, Rfl: 3    benzocaine (TOPICALE XTRA) 20 % GEL, Apply 1 g to affected area 4 times daily as needed for mouth sores., Disp: 30 g, Rfl: 5    betamethasone valerate (VALISONE) 0.1 % cream, Apply topically 2 times daily as needed , Disp: , Rfl:     bisacodyl (DULCOLAX) 5 MG EC tablet, Take 2 tablets (10 mg) by mouth daily as needed for constipation, Disp: 30 tablet, Rfl: 0    brimonidine (ALPHAGAN) 0.2 % ophthalmic solution, INSTILL ONE DROP INTO EACH EYE ONCE DAILY AS NEEDED FOR REDUCED GLARE WITH NIGHT TIME DRIVING., Disp: 5 mL, Rfl: 0    citalopram (CELEXA) 20 MG tablet, Take 1 tablet (20 mg) by mouth daily, Disp: 90 tablet, Rfl: 1    EPINEPHrine (EPIPEN 2-EAMON) 0.3 MG/0.3ML injection, Inject 0.3 mLs (0.3 mg) into the muscle as needed for anaphylaxis, Disp: 0.6 mL, Rfl: 3    etonogestrel (NEXPLANON) 68 MG IMPL, Inject 68 mg Subcutaneous, Disp: , Rfl:     fluocinonide (LIDEX) 0.05 % external gel, Apply topically 2 times daily.  Intra-oral, Disp: 60 g, Rfl: 11    fluocinonide (LIDEX) 0.05 % external gel, Apply topically 2 times daily., Disp: 60 g, Rfl: 11    folic acid (FOLVITE) 1 MG tablet, Take 1 tablet (1 mg) by mouth daily., Disp: 90 tablet, Rfl: 3    gabapentin (NEURONTIN) 300 MG capsule, Take 1 capsule (300 mg) by mouth every morning, Disp: 60 capsule, Rfl: 1    hydroquinone (ARACELY) 4 % external cream, Apply once daily for 3 months. Stop for one month. If further lightening is desired, proceed with one additional month of treatment., Disp: 28 g, Rfl: 1    hydrOXYzine HCl (ATARAX) 25 MG tablet, TAKE ONE OR TWO TABLETS BY MOUTH EVERY SIX HOURS AS NEEDED (Patient not taking: Reported on 6/24/2025), Disp: , Rfl:     lactulose 20 GM/30ML SOLN, Take 30 mLs by mouth 3 times daily as needed (for constipation), Disp: 300 mL, Rfl: 3    lanolin ointment, Apply topically every hour as needed for other (sore nipples), Disp: 7 g, Rfl: 3    lidocaine (LMX4) 4 % external cream, Apply topically once as needed for mild pain, Disp: 120 g, Rfl: 1    lifitegrast (XIIDRA) 5 % opthalmic solution, Place 1 drop into both eyes 2 times daily., Disp: 180 each, Rfl: 3    LINZESS 290 MCG capsule, TAKE 1 CAPSULE BY MOUTH EVERY DAY IN THE MORNING BEFORE BREAKFAST, Disp: 90 capsule, Rfl: 0    LORazepam (ATIVAN) 1 MG tablet, Take 1 tablet by mouth every 8 hours as needed., Disp: , Rfl:     methotrexate sodium 50 MG/2ML SOLN, Inject 0.4 mLs (10 mg) as directed once a week., Disp: 8 mL, Rfl: 5    mometasone (ELOCON) 0.1 % external ointment, Apply topically 2 times daily., Disp: 45 g, Rfl: 11    ondansetron (ZOFRAN ODT) 8 MG ODT tab, Take 1 tablet (8 mg) by mouth every 8 hours as needed, Disp: 90 tablet, Rfl: 3    polyethylene glycol (MIRALAX/GLYCOLAX) powder, Take 1 capful by mouth 3 times daily, Disp: , Rfl:     predniSONE (DELTASONE) 5 MG tablet, 15-10-5 mg a day, each course x  5 days then stop as needed for flare ups (Patient not taking: Reported on 6/24/2025),  "Disp: 30 tablet, Rfl: 2    rizatriptan (MAXALT-MLT) 5 MG ODT, DISSOLVE 1 OR 2 TABLETS IN THE MOUTH AS NEEDED FOR HEADACHE AT ONSET OF MIGRAINE, MAY REPEAT IN 2 HOURS AS NEEDED. MAX 30MG IN 24 HOURS AND MAX 5 DAYS PER MONTH, Disp: , Rfl:     Sharps Container MISC, Use for methotreaxte shots (Patient not taking: Reported on 2025), Disp: 1 each, Rfl: 11    sodium fluoride 1.1 % CREA, Apply 1 Application topically daily (Patient not taking: Reported on 2025), Disp: , Rfl:     sucralfate (CARAFATE) 1 GM/10ML suspension, Take 10 mLs (1 g) by mouth 4 times daily (Patient not taking: Reported on 2025), Disp: 1200 mL, Rfl: 2    syringe/needle, sisp, 25G X 5/8\" 1 ML MISC, Inject 1 Syringe subcutaneously every 7 days. Use with Methotrexate, Disp: 12 each, Rfl: 3    triamcinolone (KENALOG) 0.1 % external ointment, Apply twice daily as needed to lesions on the genitals and body. OK to use very sparingly on the face., Disp: 454 g, Rfl: 2    White Petrolatum-Mineral Oil (ARTIFICIAL TEARS) 83-15 % OINT, Apply 1 g to eye at bedtime. Apply to each eye at night at bedtime., Disp: 3.5 g, Rfl: 11    Current Facility-Administered Medications:     botulinum toxin type A (BOTOX) 100 units injection 200 Units, 200 Units, Intramuscular, Q90 Days, StandalAlejandrina MD, 200 Units at 25 [id number]       BOTULINUM NEUROTOXIN INJECTION PROCEDURES    VERIFICATION OF PATIENT IDENTIFICATION AND PROCEDURE     Initials   Patient Name SES   Patient  SES   Procedure Verified by: SES     Prior to the start of the procedure and with procedural staff participation, I verbally confirmed the patient s identity using two indicators, relevant allergies, that the procedure was appropriate and matched the consent or emergent situation, and that the correct equipment/implants were available. Immediately prior to starting the procedure I conducted the Time Out with the procedural staff and re-confirmed the patient s name, procedure, and " site/side. (The Joint Commission universal protocol was followed.)  Yes    Sedation (Moderate or Deep): None    ABOVE ASSESSMENTS PERFORMED BY    Alejandrina Hernandez MD    INDICATIONS FOR PROCEDURES  Samara Oropeza is a 29 year old patient with pain secondary to the diagnosis of chronic migraine headaches. Her baseline symptoms have been recalcitrant to oral medications and conservative therapy.  She is here today for reinjection with Botox.    GOAL OF PROCEDURE  The goal of this procedure is to decrease pain.      TOTAL DOSE ADMINISTERED  Dose Administered:  200 units  Botox (Botulinum Toxin Type A)       2:1 Dilution   Unavoidable Drug Waste: No.  Diluent Used:  Preservative Free Normal Saline  Total Volume of Diluent Used:  4 ml  NDC #: Botox 100u ([address]-18)      CONSENT  The risks, benefits, and treatment options were discussed with Samara Oropeaz and she agreed to proceed.    Written consent was obtained by  [facility] .     EQUIPMENT USED  Needle-25mm stimulating/recording  Needle-30 gauge  EMG/NCS Machine    SKIN PREPARATION  Skin preparation was performed using an alcohol wipe.    GUIDANCE DESCRIPTION  Electro-myographic guidance was necessary throughout the neck portion of the procedure to accurately identify all areas of spastic muscles while avoiding injection of non-spastic muscles, neighboring nerves and nearby vascular structures.       AREA/MUSCLE INJECTED:  200 UNITS BOTOX = TOTAL DOSE, 2:1 DILUTION      1. SHOULDER GIRDLE & NECK MUSCLES: 30 UNITS BOTOX = TOTAL DOSE    Right Levator Scapulae - 5 units of Botox over 2 site/s.   Left Levator Scapulae - 5 units of Botox over 1 site/s.    Right Sternocleidomastoid - 2.5 units of Botox over 1 site/s.   Left Sternocleidomastoid - 2.5 units of Botox over 1 site/s.     Right Anterior Scalene - 5 units of Botox over 1 site/s    Right Rhomboid Major - 2.5 units of Botox over 1 site/s.    Left Rhomboid Major - 2.5 units of Botox over 1 site/s.      Right  Pectoralis Minor - 2.5 units of Botox over 1 site/s.    Left Pectoralis Minor - 2.5 units of Botox over 1 site/s.     2. FACIAL & SCALP MUSCLES: 170 UNITS BOTOX = TOTAL DOSE     Right Occipitalis - 20 units of Botox over 2 site/s.   Left Occipitalis - 20 units of Botox over 2 site/s.     Right Frontalis - 10 units of Botox over 2 site/s.  Left Frontalis - 10 units of Botox over 2 site/s.     Right Temporalis - 50 units of Botox over 8 site/s.  Left Temporalis - 50 units of Botox over 8 site/s.     Right  - 2.5 units of Botox over 1 site/s.              Left  - 2.5 units of Botox over 1 site/s.                 Procerus - 5 units of Botox at 1 site/s.      BILATERAL GREATER & LESSER OCCIPITAL NERVE BLOCKS, TRIGGER POINT INJECTIONS  1% lidocaine: 2 ml  0.5% bupivacaine: 10 ml  Kenalo ml = 40 mg    ONB: Area just inferior to insertion of the right and left superior trapezius insertion onto skull was cleansed with ChloraPrep. Needle was advanced anteriorly to base of skull then slightly withdrawn and injectate was injected in a fan-like distribution at different depths. An 8 ml mixture of 1% lidocaine, 0.5% bupivacaine and Kenalog was divided into two 5 ml syringes. Total injection volume = 4 ml per side.     TPI: Areas of the skin were prepped with ChloraPrep.  Using standard precautions, a 30-gauge 1-inch needle was used to inject a 3 ml mixture of 1% lidocaine and 0.5% bupivacaine into the upper trapezius and rhomboid major/minor muscles bilaterally for a total of 8 sites.         RESPONSE TO PROCEDURE  Samara Oropeza tolerated the procedure well and there were no immediate complications. She was allowed to recover for an appropriate period of time and was discharged home in stable condition.      ASSESSMENT AND PLAN   Botulinum toxin injections: No changes to the botox injections today. Occipital nerve blocks and trigger point injections also administered per patient request to address  occipital neuralgia and myofascial pain. Patient will continue to monitor response to Botox and report at next appointment.   Referrals: None.   Medications: No changes.   Follow up: Samara Oropeza was rescheduled for the next series of injections in 12 weeks, at which time we will evaluate response to today's injections. she may call the clinic prior with any questions or concerns prior to the next appointment.       Eliana Rolon MD  Movement Disorders Fellow     Patient seen and discussed with Dr. Hernandez      I, Alejandrina Hernandez MD, saw this patient with movement disorder fellow, Dr. Rolon, and agree with the findings and plan of care as documented in this note. I personally reviewed the chart (vitals signs, medications, labs and imaging). My key findings and key management decisions made are reflected in this note.  I was present for the entire procedure listed above.      Alejandrina Hernandez MD

## 2025-07-23 RX ORDER — BUPIVACAINE HYDROCHLORIDE 5 MG/ML
10 INJECTION, SOLUTION PERINEURAL ONCE
Status: COMPLETED | OUTPATIENT
Start: 2025-07-23 | End: 2025-07-23

## 2025-07-23 RX ORDER — TRIAMCINOLONE ACETONIDE 40 MG/ML
40 INJECTION, SUSPENSION INTRA-ARTICULAR; INTRAMUSCULAR ONCE
Status: COMPLETED | OUTPATIENT
Start: 2025-07-23 | End: 2025-07-23

## 2025-07-23 RX ADMIN — BUPIVACAINE HYDROCHLORIDE 50 MG: 5 INJECTION, SOLUTION PERINEURAL at 13:04

## 2025-07-23 RX ADMIN — TRIAMCINOLONE ACETONIDE 40 MG: 40 INJECTION, SUSPENSION INTRA-ARTICULAR; INTRAMUSCULAR at 13:04

## 2025-08-06 ENCOUNTER — INFUSION THERAPY VISIT (OUTPATIENT)
Dept: INFUSION THERAPY | Facility: HOSPITAL | Age: 30
End: 2025-08-06
Attending: INTERNAL MEDICINE
Payer: COMMERCIAL

## 2025-08-06 VITALS
DIASTOLIC BLOOD PRESSURE: 77 MMHG | BODY MASS INDEX: 26.22 KG/M2 | HEART RATE: 85 BPM | WEIGHT: 148 LBS | SYSTOLIC BLOOD PRESSURE: 120 MMHG | TEMPERATURE: 98.5 F

## 2025-08-06 DIAGNOSIS — M06.09 RHEUMATOID ARTHRITIS OF MULTIPLE SITES WITHOUT RHEUMATOID FACTOR (H): Primary | ICD-10-CM

## 2025-08-06 PROCEDURE — 999N000248 HC STATISTIC IV INSERT WITH US BY RN

## 2025-08-06 PROCEDURE — 96375 TX/PRO/DX INJ NEW DRUG ADDON: CPT

## 2025-08-06 PROCEDURE — 258N000003 HC RX IP 258 OP 636

## 2025-08-06 PROCEDURE — 250N000011 HC RX IP 250 OP 636: Performed by: INTERNAL MEDICINE

## 2025-08-06 PROCEDURE — 96413 CHEMO IV INFUSION 1 HR: CPT

## 2025-08-06 PROCEDURE — 96415 CHEMO IV INFUSION ADDL HR: CPT

## 2025-08-06 PROCEDURE — 250N000011 HC RX IP 250 OP 636: Mod: JZ

## 2025-08-06 PROCEDURE — 250N000013 HC RX MED GY IP 250 OP 250 PS 637

## 2025-08-06 RX ORDER — METHYLPREDNISOLONE SODIUM SUCCINATE 125 MG/2ML
125 INJECTION INTRAMUSCULAR; INTRAVENOUS ONCE
Status: COMPLETED | OUTPATIENT
Start: 2025-08-06 | End: 2025-08-06

## 2025-08-06 RX ORDER — EPINEPHRINE 1 MG/ML
0.3 INJECTION, SOLUTION INTRAMUSCULAR; SUBCUTANEOUS EVERY 5 MIN PRN
Status: DISCONTINUED | OUTPATIENT
Start: 2025-08-06 | End: 2025-08-06 | Stop reason: HOSPADM

## 2025-08-06 RX ORDER — ALBUTEROL SULFATE 0.83 MG/ML
2.5 SOLUTION RESPIRATORY (INHALATION)
OUTPATIENT
Start: 2025-09-30

## 2025-08-06 RX ORDER — DIPHENHYDRAMINE HYDROCHLORIDE 50 MG/ML
50 INJECTION, SOLUTION INTRAMUSCULAR; INTRAVENOUS
Start: 2025-09-30

## 2025-08-06 RX ORDER — HEPARIN SODIUM,PORCINE 10 UNIT/ML
5-20 VIAL (ML) INTRAVENOUS DAILY PRN
OUTPATIENT
Start: 2025-09-30

## 2025-08-06 RX ORDER — ALBUTEROL SULFATE 90 UG/1
1-2 INHALANT RESPIRATORY (INHALATION)
Status: DISCONTINUED | OUTPATIENT
Start: 2025-08-06 | End: 2025-08-06 | Stop reason: HOSPADM

## 2025-08-06 RX ORDER — EPINEPHRINE 1 MG/ML
0.3 INJECTION, SOLUTION INTRAMUSCULAR; SUBCUTANEOUS EVERY 5 MIN PRN
OUTPATIENT
Start: 2025-09-30

## 2025-08-06 RX ORDER — ALBUTEROL SULFATE 0.83 MG/ML
2.5 SOLUTION RESPIRATORY (INHALATION)
Status: DISCONTINUED | OUTPATIENT
Start: 2025-08-06 | End: 2025-08-06 | Stop reason: HOSPADM

## 2025-08-06 RX ORDER — DIPHENHYDRAMINE HYDROCHLORIDE 50 MG/ML
50 INJECTION, SOLUTION INTRAMUSCULAR; INTRAVENOUS
Status: DISCONTINUED | OUTPATIENT
Start: 2025-08-06 | End: 2025-08-06 | Stop reason: HOSPADM

## 2025-08-06 RX ORDER — METHYLPREDNISOLONE SODIUM SUCCINATE 125 MG/2ML
125 INJECTION INTRAMUSCULAR; INTRAVENOUS ONCE
OUTPATIENT
Start: 2025-09-30 | End: 2025-09-30

## 2025-08-06 RX ORDER — ACETAMINOPHEN 325 MG/1
650 TABLET ORAL ONCE
Status: COMPLETED | OUTPATIENT
Start: 2025-08-06 | End: 2025-08-06

## 2025-08-06 RX ORDER — HEPARIN SODIUM (PORCINE) LOCK FLUSH IV SOLN 100 UNIT/ML 100 UNIT/ML
5 SOLUTION INTRAVENOUS
OUTPATIENT
Start: 2025-09-30

## 2025-08-06 RX ORDER — METHYLPREDNISOLONE SODIUM SUCCINATE 40 MG/ML
40 INJECTION INTRAMUSCULAR; INTRAVENOUS
Status: DISCONTINUED | OUTPATIENT
Start: 2025-08-06 | End: 2025-08-06 | Stop reason: HOSPADM

## 2025-08-06 RX ORDER — ACETAMINOPHEN 325 MG/1
650 TABLET ORAL ONCE
Start: 2025-09-30 | End: 2025-09-30

## 2025-08-06 RX ORDER — DIPHENHYDRAMINE HYDROCHLORIDE 50 MG/ML
25 INJECTION, SOLUTION INTRAMUSCULAR; INTRAVENOUS
Start: 2025-09-30

## 2025-08-06 RX ORDER — DIPHENHYDRAMINE HYDROCHLORIDE 50 MG/ML
25 INJECTION, SOLUTION INTRAMUSCULAR; INTRAVENOUS
Status: DISCONTINUED | OUTPATIENT
Start: 2025-08-06 | End: 2025-08-06 | Stop reason: HOSPADM

## 2025-08-06 RX ORDER — METHYLPREDNISOLONE SODIUM SUCCINATE 40 MG/ML
40 INJECTION INTRAMUSCULAR; INTRAVENOUS
Start: 2025-09-30

## 2025-08-06 RX ORDER — DIPHENHYDRAMINE HCL 25 MG
25 CAPSULE ORAL ONCE
Start: 2025-09-30 | End: 2025-09-30

## 2025-08-06 RX ORDER — ALBUTEROL SULFATE 90 UG/1
1-2 INHALANT RESPIRATORY (INHALATION)
Start: 2025-09-30

## 2025-08-06 RX ADMIN — FAMOTIDINE 20 MG: 10 INJECTION INTRAVENOUS at 09:22

## 2025-08-06 RX ADMIN — DIPHENHYDRAMINE HYDROCHLORIDE 25 MG: 50 INJECTION INTRAMUSCULAR; INTRAVENOUS at 09:35

## 2025-08-06 RX ADMIN — ACETAMINOPHEN 650 MG: 325 TABLET ORAL at 09:22

## 2025-08-06 RX ADMIN — METHYLPREDNISOLONE SODIUM SUCCINATE 125 MG: 125 INJECTION, POWDER, FOR SOLUTION INTRAMUSCULAR; INTRAVENOUS at 09:24

## 2025-08-06 RX ADMIN — SODIUM CHLORIDE 250 ML: 0.9 INJECTION, SOLUTION INTRAVENOUS at 09:24

## 2025-08-06 RX ADMIN — INFLIXIMAB 200 MG: 100 INJECTION, POWDER, LYOPHILIZED, FOR SOLUTION INTRAVENOUS at 10:02

## 2025-08-28 ENCOUNTER — MYC MEDICAL ADVICE (OUTPATIENT)
Dept: RHEUMATOLOGY | Facility: CLINIC | Age: 30
End: 2025-08-28
Payer: COMMERCIAL

## 2025-08-28 DIAGNOSIS — H53.71 GLARE SENSITIVITY: ICD-10-CM

## 2025-08-29 ENCOUNTER — VIRTUAL VISIT (OUTPATIENT)
Dept: PHARMACY | Facility: CLINIC | Age: 30
End: 2025-08-29
Attending: INTERNAL MEDICINE
Payer: COMMERCIAL

## 2025-08-29 DIAGNOSIS — M06.09 RHEUMATOID ARTHRITIS OF MULTIPLE SITES WITHOUT RHEUMATOID FACTOR (H): ICD-10-CM

## 2025-08-29 DIAGNOSIS — K13.79 RECURRENT ORAL ULCERS: ICD-10-CM

## 2025-08-29 DIAGNOSIS — M35.2 BEHCET'S DISEASE (H): Primary | ICD-10-CM

## 2025-09-02 RX ORDER — BRIMONIDINE TARTRATE 2 MG/ML
SOLUTION/ DROPS OPHTHALMIC
Qty: 5 ML | Refills: 1 | Status: SHIPPED | OUTPATIENT
Start: 2025-09-02

## (undated) DEVICE — BNDG ELASTIC 4" DBL LENGTH UNSTERILE 6611-14

## (undated) DEVICE — DRAPE POUCH IRR 1016

## (undated) DEVICE — BAG CLEAR TRASH 1.3M 39X33" P4040C

## (undated) DEVICE — LINEN ORTHO ACL PACK 5447

## (undated) DEVICE — PREP SKIN SCRUB TRAY 4461A

## (undated) DEVICE — BNDG KLING 4" 2236

## (undated) DEVICE — GLOVE PROTEXIS POWDER FREE 7.5 ORTHOPEDIC 2D73ET75

## (undated) DEVICE — LINEN TOWEL PACK X5 5464

## (undated) DEVICE — SU VICRYL 4-0 PS-2 18" UND J496H

## (undated) DEVICE — SUCTION MANIFOLD NEPTUNE 2 SYS 4 PORT 0702-020-000

## (undated) DEVICE — APPLICATOR COTTON TIP 6"X2 STERILE LF 6012

## (undated) DEVICE — CAST PADDING 4" STERILE 9044S

## (undated) DEVICE — GOWN IMPERVIOUS SPECIALTY XLG/XLONG 32474

## (undated) DEVICE — BLADE SHAVER ARTHRO 2.9MM CUDA C9951

## (undated) DEVICE — TUBING IRRIG TUR Y TYPE 96" LF 6543-01

## (undated) DEVICE — PAD CHUX UNDERPAD 23X24" 7136

## (undated) DEVICE — LINEN FULL SHEET 5511

## (undated) DEVICE — PACK LOWER EXTREMITY RIVERSIDE SOP32LEFSX

## (undated) DEVICE — NDL 18GA 1.5" 305196

## (undated) DEVICE — LINEN HALF SHEET 5512

## (undated) DEVICE — GLOVE PROTEXIS BLUE W/NEU-THERA 8.0  2D73EB80

## (undated) DEVICE — BNDG ELASTIC 4"X5YDS UNSTERILE 6611-40

## (undated) DEVICE — DRAIN JACKSON PRATT 07FR ROUND SU130-1320

## (undated) DEVICE — DRSG KERLIX FLUFFS X5

## (undated) DEVICE — SU VICRYL 3-0 CT-2 27" UND J232H

## (undated) DEVICE — KIT CULTURE ESWAB AEROBE/ANAEROBE WHITE TOP R723480

## (undated) DEVICE — STRAP KNEE/BODY 31143004

## (undated) DEVICE — SOL WATER IRRIG 1000ML BOTTLE 2F7114

## (undated) DEVICE — PACK EXTREMITY SOP15EXFSD

## (undated) DEVICE — KIT CULTURE TRANSPORT SYS A.C.T. II DUAL ANEROBE R124022

## (undated) DEVICE — BLADE KNIFE SURG 11 371111

## (undated) DEVICE — CAST PADDING 4" UNSTERILE 9044

## (undated) DEVICE — PACK LOWER EXTREMITY RIDGES

## (undated) DEVICE — GLOVE PROTEXIS POWDER FREE 8.0 ORTHOPEDIC 2D73ET80

## (undated) DEVICE — SYR 20ML LL

## (undated) DEVICE — ESU GROUND PAD ADULT W/CORD E7507

## (undated) DEVICE — BNDG ELASTIC 6"X5YDS UNSTERILE 6611-60

## (undated) DEVICE — SOL NACL 0.9% INJ 1000ML BAG 2B1324X

## (undated) DEVICE — SU FIBERWIRE 0 38" BLUE 22.2MM W/TAPER NDL  AR-7250

## (undated) DEVICE — PREP POVIDONE IODINE SOLUTION 10% 120ML

## (undated) DEVICE — LINEN ORTHO PACK 5446

## (undated) DEVICE — SOL NACL 0.9% IRRIG 3000ML BAG 07972-08

## (undated) DEVICE — SOL NACL 0.9% IRRIG 1000ML BOTTLE 2F7124

## (undated) DEVICE — SU ETHILON 4-0 FS-2 18" 662H

## (undated) DEVICE — TOURNIQUET CUFF 30" REPRO BLUE 60-7070-105

## (undated) DEVICE — CAST PLASTER SPLINT 5X30" 7395

## (undated) DEVICE — SU ETHILON 3-0 FS-1 18" 669H

## (undated) DEVICE — PREP CHLORAPREP 26ML TINTED ORANGE  260815

## (undated) DEVICE — SPECIMEN CONTAINER 5OZ STERILE 2600SA

## (undated) DEVICE — ESU PENCIL W/SMOKE EVAC NEPTUNE STRYKER 0703-046-000

## (undated) DEVICE — KIT SURGICAL TURNOVER FVSD-01D

## (undated) DEVICE — DRAPE C-ARM MINI 5423

## (undated) DEVICE — SYR 10ML FINGER CONTROL W/O NDL 309695

## (undated) DEVICE — SUCTION MANIFOLD DORNOCH ULTRA CART UL-CL500

## (undated) DEVICE — LINEN TOWEL PACK X10 5473

## (undated) DEVICE — SU ETHILON 2-0 PS 18" 585H

## (undated) DEVICE — BLADE KNIFE SURG 15 371115

## (undated) DEVICE — PREP POVIDONE IODINE SCRUB 7.5% 120ML

## (undated) DEVICE — COVER FOOTSWITCH W/CINCH 20X24" 923267

## (undated) DEVICE — SU VICRYL 3-0 SH 27" UND J416H

## (undated) DEVICE — DRAIN JACKSON PRATT RESERVOIR 100ML SU130-1305

## (undated) RX ORDER — LIDOCAINE HYDROCHLORIDE 10 MG/ML
INJECTION, SOLUTION EPIDURAL; INFILTRATION; INTRACAUDAL; PERINEURAL
Status: DISPENSED
Start: 2019-03-13

## (undated) RX ORDER — BUPIVACAINE HYDROCHLORIDE 2.5 MG/ML
INJECTION, SOLUTION EPIDURAL; INFILTRATION; INTRACAUDAL
Status: DISPENSED
Start: 2017-01-05

## (undated) RX ORDER — BUPIVACAINE HYDROCHLORIDE 5 MG/ML
INJECTION, SOLUTION EPIDURAL; INTRACAUDAL
Status: DISPENSED
Start: 2019-05-07

## (undated) RX ORDER — FENTANYL CITRATE 50 UG/ML
INJECTION, SOLUTION INTRAMUSCULAR; INTRAVENOUS
Status: DISPENSED
Start: 2019-09-25

## (undated) RX ORDER — BUPIVACAINE HYDROCHLORIDE 2.5 MG/ML
INJECTION, SOLUTION EPIDURAL; INFILTRATION; INTRACAUDAL
Status: DISPENSED
Start: 2019-07-24

## (undated) RX ORDER — FENTANYL CITRATE 50 UG/ML
INJECTION, SOLUTION INTRAMUSCULAR; INTRAVENOUS
Status: DISPENSED
Start: 2019-03-12

## (undated) RX ORDER — BUPIVACAINE HYDROCHLORIDE 2.5 MG/ML
INJECTION, SOLUTION EPIDURAL; INFILTRATION; INTRACAUDAL
Status: DISPENSED
Start: 2018-02-28

## (undated) RX ORDER — HEPARIN SODIUM 1000 [USP'U]/ML
INJECTION, SOLUTION INTRAVENOUS; SUBCUTANEOUS
Status: DISPENSED
Start: 2020-02-25

## (undated) RX ORDER — BUPIVACAINE HYDROCHLORIDE 2.5 MG/ML
INJECTION, SOLUTION EPIDURAL; INFILTRATION; INTRACAUDAL
Status: DISPENSED
Start: 2020-05-05

## (undated) RX ORDER — FENTANYL CITRATE 0.05 MG/ML
INJECTION, SOLUTION INTRAMUSCULAR; INTRAVENOUS
Status: DISPENSED
Start: 2019-09-25

## (undated) RX ORDER — KETAMINE HCL IN 0.9 % NACL 50 MG/5 ML
SYRINGE (ML) INTRAVENOUS
Status: DISPENSED
Start: 2019-01-02

## (undated) RX ORDER — BUPIVACAINE HYDROCHLORIDE 2.5 MG/ML
INJECTION, SOLUTION EPIDURAL; INFILTRATION; INTRACAUDAL
Status: DISPENSED
Start: 2018-08-29

## (undated) RX ORDER — BUPIVACAINE HYDROCHLORIDE 2.5 MG/ML
INJECTION, SOLUTION EPIDURAL; INFILTRATION; INTRACAUDAL
Status: DISPENSED
Start: 2017-06-22

## (undated) RX ORDER — ONDANSETRON 2 MG/ML
INJECTION INTRAMUSCULAR; INTRAVENOUS
Status: DISPENSED
Start: 2019-01-02

## (undated) RX ORDER — BUPIVACAINE HYDROCHLORIDE 2.5 MG/ML
INJECTION, SOLUTION EPIDURAL; INFILTRATION; INTRACAUDAL
Status: DISPENSED
Start: 2021-04-08

## (undated) RX ORDER — CLINDAMYCIN PHOSPHATE 900 MG/50ML
INJECTION, SOLUTION INTRAVENOUS
Status: DISPENSED
Start: 2019-05-07

## (undated) RX ORDER — LIDOCAINE HYDROCHLORIDE 10 MG/ML
INJECTION, SOLUTION EPIDURAL; INFILTRATION; INTRACAUDAL; PERINEURAL
Status: DISPENSED
Start: 2019-05-05

## (undated) RX ORDER — FENTANYL CITRATE 50 UG/ML
INJECTION, SOLUTION INTRAMUSCULAR; INTRAVENOUS
Status: DISPENSED
Start: 2020-02-25

## (undated) RX ORDER — BUPIVACAINE HYDROCHLORIDE 2.5 MG/ML
INJECTION, SOLUTION EPIDURAL; INFILTRATION; INTRACAUDAL
Status: DISPENSED
Start: 2021-10-05

## (undated) RX ORDER — OXYCODONE AND ACETAMINOPHEN 5; 325 MG/1; MG/1
TABLET ORAL
Status: DISPENSED
Start: 2019-01-02

## (undated) RX ORDER — BUPIVACAINE HYDROCHLORIDE 2.5 MG/ML
INJECTION, SOLUTION EPIDURAL; INFILTRATION; INTRACAUDAL
Status: DISPENSED
Start: 2020-10-20

## (undated) RX ORDER — BUPIVACAINE HYDROCHLORIDE 5 MG/ML
INJECTION, SOLUTION EPIDURAL; INTRACAUDAL
Status: DISPENSED
Start: 2019-02-05

## (undated) RX ORDER — DEXAMETHASONE SODIUM PHOSPHATE 4 MG/ML
INJECTION, SOLUTION INTRA-ARTICULAR; INTRALESIONAL; INTRAMUSCULAR; INTRAVENOUS; SOFT TISSUE
Status: DISPENSED
Start: 2019-09-25

## (undated) RX ORDER — BUPIVACAINE HYDROCHLORIDE 2.5 MG/ML
INJECTION, SOLUTION EPIDURAL; INFILTRATION; INTRACAUDAL
Status: DISPENSED
Start: 2021-07-09

## (undated) RX ORDER — HYDROMORPHONE HYDROCHLORIDE 1 MG/ML
INJECTION, SOLUTION INTRAMUSCULAR; INTRAVENOUS; SUBCUTANEOUS
Status: DISPENSED
Start: 2019-03-12

## (undated) RX ORDER — BUPIVACAINE HYDROCHLORIDE 2.5 MG/ML
INJECTION, SOLUTION EPIDURAL; INFILTRATION; INTRACAUDAL
Status: DISPENSED
Start: 2019-01-02

## (undated) RX ORDER — BUPIVACAINE HYDROCHLORIDE 5 MG/ML
INJECTION, SOLUTION EPIDURAL; INTRACAUDAL
Status: DISPENSED
Start: 2018-11-13

## (undated) RX ORDER — GLYCOPYRROLATE 0.2 MG/ML
INJECTION INTRAMUSCULAR; INTRAVENOUS
Status: DISPENSED
Start: 2019-03-12

## (undated) RX ORDER — PROPOFOL 10 MG/ML
INJECTION, EMULSION INTRAVENOUS
Status: DISPENSED
Start: 2019-03-12

## (undated) RX ORDER — ONDANSETRON 2 MG/ML
INJECTION INTRAMUSCULAR; INTRAVENOUS
Status: DISPENSED
Start: 2019-03-12

## (undated) RX ORDER — FENTANYL CITRATE 50 UG/ML
INJECTION, SOLUTION INTRAMUSCULAR; INTRAVENOUS
Status: DISPENSED
Start: 2019-01-02

## (undated) RX ORDER — BUPIVACAINE HYDROCHLORIDE 2.5 MG/ML
INJECTION, SOLUTION EPIDURAL; INFILTRATION; INTRACAUDAL
Status: DISPENSED
Start: 2019-05-01

## (undated) RX ORDER — BUPIVACAINE HYDROCHLORIDE 2.5 MG/ML
INJECTION, SOLUTION EPIDURAL; INFILTRATION; INTRACAUDAL
Status: DISPENSED
Start: 2021-12-28

## (undated) RX ORDER — BUPIVACAINE HYDROCHLORIDE 2.5 MG/ML
INJECTION, SOLUTION EPIDURAL; INFILTRATION; INTRACAUDAL
Status: DISPENSED
Start: 2020-07-28

## (undated) RX ORDER — KETAMINE HCL IN 0.9 % NACL 50 MG/5 ML
SYRINGE (ML) INTRAVENOUS
Status: DISPENSED
Start: 2019-03-12

## (undated) RX ORDER — DEXAMETHASONE SODIUM PHOSPHATE 4 MG/ML
INJECTION, SOLUTION INTRA-ARTICULAR; INTRALESIONAL; INTRAMUSCULAR; INTRAVENOUS; SOFT TISSUE
Status: DISPENSED
Start: 2019-03-12

## (undated) RX ORDER — CLINDAMYCIN PHOSPHATE 900 MG/50ML
INJECTION, SOLUTION INTRAVENOUS
Status: DISPENSED
Start: 2019-09-25

## (undated) RX ORDER — LIDOCAINE HYDROCHLORIDE 20 MG/ML
INJECTION, SOLUTION EPIDURAL; INFILTRATION; INTRACAUDAL; PERINEURAL
Status: DISPENSED
Start: 2019-09-25

## (undated) RX ORDER — CLINDAMYCIN PHOSPHATE 900 MG/50ML
INJECTION, SOLUTION INTRAVENOUS
Status: DISPENSED
Start: 2019-01-02

## (undated) RX ORDER — PROPOFOL 10 MG/ML
INJECTION, EMULSION INTRAVENOUS
Status: DISPENSED
Start: 2019-09-25

## (undated) RX ORDER — DIPHENHYDRAMINE HYDROCHLORIDE 50 MG/ML
INJECTION INTRAMUSCULAR; INTRAVENOUS
Status: DISPENSED
Start: 2019-05-07

## (undated) RX ORDER — LIDOCAINE HYDROCHLORIDE 20 MG/ML
INJECTION, SOLUTION EPIDURAL; INFILTRATION; INTRACAUDAL; PERINEURAL
Status: DISPENSED
Start: 2019-03-12

## (undated) RX ORDER — GLYCOPYRROLATE 0.2 MG/ML
INJECTION, SOLUTION INTRAMUSCULAR; INTRAVENOUS
Status: DISPENSED
Start: 2019-09-25

## (undated) RX ORDER — OXYCODONE HYDROCHLORIDE 5 MG/1
TABLET ORAL
Status: DISPENSED
Start: 2019-09-25

## (undated) RX ORDER — LIDOCAINE HYDROCHLORIDE 10 MG/ML
INJECTION, SOLUTION EPIDURAL; INFILTRATION; INTRACAUDAL; PERINEURAL
Status: DISPENSED
Start: 2020-02-25

## (undated) RX ORDER — BUPIVACAINE HYDROCHLORIDE 2.5 MG/ML
INJECTION, SOLUTION EPIDURAL; INFILTRATION; INTRACAUDAL
Status: DISPENSED
Start: 2018-05-29

## (undated) RX ORDER — SODIUM CHLORIDE 9 MG/ML
INJECTION, SOLUTION INTRAVENOUS
Status: DISPENSED
Start: 2020-02-25

## (undated) RX ORDER — DIPHENHYDRAMINE HYDROCHLORIDE 50 MG/ML
INJECTION INTRAMUSCULAR; INTRAVENOUS
Status: DISPENSED
Start: 2020-02-25

## (undated) RX ORDER — FENTANYL CITRATE 50 UG/ML
INJECTION, SOLUTION INTRAMUSCULAR; INTRAVENOUS
Status: DISPENSED
Start: 2019-05-07

## (undated) RX ORDER — NEOSTIGMINE METHYLSULFATE 1 MG/ML
VIAL (ML) INJECTION
Status: DISPENSED
Start: 2019-03-12

## (undated) RX ORDER — NEOSTIGMINE METHYLSULFATE 1 MG/ML
VIAL (ML) INJECTION
Status: DISPENSED
Start: 2019-09-25

## (undated) RX ORDER — BUPIVACAINE HYDROCHLORIDE 2.5 MG/ML
INJECTION, SOLUTION EPIDURAL; INFILTRATION; INTRACAUDAL
Status: DISPENSED
Start: 2021-01-13

## (undated) RX ORDER — BUPIVACAINE HYDROCHLORIDE 2.5 MG/ML
INJECTION, SOLUTION EPIDURAL; INFILTRATION; INTRACAUDAL
Status: DISPENSED
Start: 2020-01-08

## (undated) RX ORDER — HEPARIN SODIUM,PORCINE 10 UNIT/ML
VIAL (ML) INTRAVENOUS
Status: DISPENSED
Start: 2019-03-13

## (undated) RX ORDER — ONDANSETRON 2 MG/ML
INJECTION INTRAMUSCULAR; INTRAVENOUS
Status: DISPENSED
Start: 2019-09-25

## (undated) RX ORDER — BUPIVACAINE HYDROCHLORIDE 2.5 MG/ML
INJECTION, SOLUTION EPIDURAL; INFILTRATION; INTRACAUDAL
Status: DISPENSED
Start: 2019-10-17